# Patient Record
Sex: FEMALE | Race: BLACK OR AFRICAN AMERICAN | NOT HISPANIC OR LATINO | Employment: OTHER | URBAN - METROPOLITAN AREA
[De-identification: names, ages, dates, MRNs, and addresses within clinical notes are randomized per-mention and may not be internally consistent; named-entity substitution may affect disease eponyms.]

---
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
## 2019-01-05 ENCOUNTER — TRANSFERRED RECORDS (OUTPATIENT)
Dept: HEALTH INFORMATION MANAGEMENT | Facility: CLINIC | Age: 19
End: 2019-01-05

## 2019-01-06 ENCOUNTER — TRANSFERRED RECORDS (OUTPATIENT)
Dept: HEALTH INFORMATION MANAGEMENT | Facility: CLINIC | Age: 19
End: 2019-01-06

## 2019-01-07 ENCOUNTER — TRANSFERRED RECORDS (OUTPATIENT)
Dept: HEALTH INFORMATION MANAGEMENT | Facility: CLINIC | Age: 19
End: 2019-01-07

## 2019-01-08 ENCOUNTER — TRANSFERRED RECORDS (OUTPATIENT)
Dept: HEALTH INFORMATION MANAGEMENT | Facility: CLINIC | Age: 19
End: 2019-01-08

## 2019-01-09 ENCOUNTER — TRANSFERRED RECORDS (OUTPATIENT)
Dept: HEALTH INFORMATION MANAGEMENT | Facility: CLINIC | Age: 19
End: 2019-01-09

## 2019-01-18 ENCOUNTER — TRANSFERRED RECORDS (OUTPATIENT)
Dept: HEALTH INFORMATION MANAGEMENT | Facility: CLINIC | Age: 19
End: 2019-01-18

## 2019-01-19 ENCOUNTER — TRANSFERRED RECORDS (OUTPATIENT)
Dept: HEALTH INFORMATION MANAGEMENT | Facility: CLINIC | Age: 19
End: 2019-01-19

## 2019-01-24 ENCOUNTER — TRANSFERRED RECORDS (OUTPATIENT)
Dept: HEALTH INFORMATION MANAGEMENT | Facility: CLINIC | Age: 19
End: 2019-01-24

## 2019-01-31 ENCOUNTER — PRE VISIT (OUTPATIENT)
Dept: ONCOLOGY | Facility: CLINIC | Age: 19
End: 2019-01-31

## 2019-03-15 ENCOUNTER — TRANSFERRED RECORDS (OUTPATIENT)
Dept: HEALTH INFORMATION MANAGEMENT | Facility: CLINIC | Age: 19
End: 2019-03-15

## 2019-03-16 ENCOUNTER — TRANSFERRED RECORDS (OUTPATIENT)
Dept: HEALTH INFORMATION MANAGEMENT | Facility: CLINIC | Age: 19
End: 2019-03-16

## 2019-04-22 ENCOUNTER — TRANSFERRED RECORDS (OUTPATIENT)
Dept: HEALTH INFORMATION MANAGEMENT | Facility: CLINIC | Age: 19
End: 2019-04-22

## 2019-04-23 ENCOUNTER — TRANSFERRED RECORDS (OUTPATIENT)
Dept: HEALTH INFORMATION MANAGEMENT | Facility: CLINIC | Age: 19
End: 2019-04-23

## 2019-05-03 ENCOUNTER — TRANSFERRED RECORDS (OUTPATIENT)
Dept: HEALTH INFORMATION MANAGEMENT | Facility: CLINIC | Age: 19
End: 2019-05-03

## 2019-05-13 ENCOUNTER — TRANSFERRED RECORDS (OUTPATIENT)
Dept: HEALTH INFORMATION MANAGEMENT | Facility: CLINIC | Age: 19
End: 2019-05-13

## 2019-05-30 NOTE — TELEPHONE ENCOUNTER
RECORDS RECEIVED FROM: NEW right upper extremity weakness. Referred by Dr Bereket Collins at [facility]. Hx stroke as toddler.   DATE RECEIVED: Jun 6, 2019    NOTES STATUS DETAILS   OFFICE NOTE from referring provider Received Dr. Collins 5/13/19   OFFICE NOTE from other specialist N/A    DISCHARGE SUMMARY from hospital N/A    DISCHARGE REPORT from the ER N/A    OPERATIVE REPORT N/A    MEDICATION LIST Received    IMPLANT RECORD/STICKER N/A    LABS     CBC/DIFF N/A    CULTURES N/A    INJECTIONS DONE IN RADIOLOGY N/A    MRI N/A    CT SCAN N/A    XRAYS (IMAGES & REPORTS) N/A    TUMOR     PATHOLOGY  Slides & report N/A      05/30/19   12:37 PM  [facility]    05/31/19   9:35 AM  [facility] faxed back requiring nadeem. Faxed 2nd request with referral line in bigger font.

## 2019-06-05 NOTE — PROGRESS NOTES
[facility]  Orthopedics  Anai Franco MD  2019     Name: Jennifer Cervantes  MRN: [id number]  Age: 19 year old  : 2000  Referring provider: [facility] *     Chief Complaint: Consult (Surgery consult. Right upper extremity. Sickle cell. Patient stated that she had a stroke when she 1 years old and has had right sided weakness since then. Patient stated that [facility] is from [facility] and [facility] referred her. Patient stated that when she feel emotions her arm tenses up and is painful.  )    History of Present Illness:   Jennifer Cervantes is a 19 year old, female with a history of sickle cell and stroke when she was 1-2 years old who presents today for evaluation of right upper extremity weakness. She has had right upper extremity weakness since her stoke as an infant. She has feeling and motion in the arm, but reports her elbow is fixed at 90 degrees of flexion. When she gets startled her arm tenses up. Her wrist is fixed in roughly 90 degrees of flexion. She can slightly flex her digits but cannot extend her digits. She experiences weakness in the hand. She has been to OT and obtained a brace that provided no relief. Of note she is starting college at [employer] in the fall. She voices no further concerns at this time.     Review of Systems:   A 10-point review of systems was obtained and is negative except for as noted in the HPI.     Medications:     Current Outpatient Medications:      ALBUTEROL 108 (90 BASE) MCG/ACT inhaler, , Disp: , Rfl:      Budesonide-Formoterol Fumarate (SYMBICORT IN), Inhale  into the lungs., Disp: , Rfl:      EPINEPHrine (EPIPEN) 0.3 MG/0.3ML injection, , Disp: , Rfl:      GNP BISA-LAX 5 MG EC tablet, , Disp: , Rfl:      GNP SENNA-LAX 8.6 MG tablet, , Disp: , Rfl:      GNP STOOL SOFTENER 100 MG capsule, , Disp: , Rfl:      hydrOXYzine (ATARAX) 25 MG tablet, , Disp: , Rfl:      JADENU 360 MG tablet, , Disp: , Rfl:      levonorgestrel (LILETTA, 52 MG,) 19.5 MCG/DAY IUD, ,  "Disp: , Rfl:      lidocaine-prilocaine (EMLA) cream, , Disp: , Rfl:      LORazepam (ATIVAN) 1 MG tablet, , Disp: , Rfl:      oxyCODONE (ROXICODONE) 5 MG immediate release tablet, , Disp: , Rfl:      Pregabalin (LYRICA PO), Take by mouth 2 times daily, Disp: , Rfl:      Q- MG tablet, , Disp: , Rfl:      sertraline (ZOLOFT) 50 MG tablet, , Disp: , Rfl:      sodium phosphate (FLEET ENEMA) 7-19 GM/118ML rectal enema, , Disp: , Rfl:      traMADol (ULTRAM) 50 MG tablet, , Disp: , Rfl:      cyclobenzaprine (FLEXERIL) 5 MG tablet, , Disp: , Rfl:      DULERA 200-5 MCG/ACT oral inhaler, , Disp: , Rfl:      GNP ASPIRIN 325 MG EC tablet, , Disp: , Rfl:      hydroxyurea (HYDREA) 500 MG capsule CHEMO, , Disp: , Rfl:      ibuprofen (ADVIL,MOTRIN) 200 MG tablet, , Disp: , Rfl:      lidocaine (LIDODERM) 5 % patch, , Disp: , Rfl:      oxyCODONE (ROXICODONE) 10 MG immediate release tablet, , Disp: , Rfl:      SUMAtriptan (IMITREX) 100 MG tablet, , Disp: , Rfl:      UNABLE TO FIND, Hydreauxoria? - Sickle cell med, Disp: , Rfl:     Allergies:  Fish allergy  Seafood    Past Medical History:  No past medical history on file.    Past Surgical History:  No past surgical history on file.     Social History:  Patient is single. She denies tobacco use and alcohol use is not on file.     Family History:  No family history on file.    Physical Examination:  Height 1.626 m (5' 4\"), weight 68 kg (150 lb).  General: Healthy appearing female. Affect appropriate. Normal gait. Alert and oriented to surroundings.   Right Upper Extremity: elbow ROM full flexion and lacks terminal extension 90 degrees. 90 degrees of wrist flexion and maximal passive flexion is -45 degrees. Limited grasp. Right hand difficulty opening.     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    Plan:   Referred patient to [facility] to obtain a [facility] upper extremity assessment. She will follow-up with after this assessment to discuss further treatment options. All questions are answered in clinic.     Scribe Disclosure:  I, Jake Morelos, am serving as a scribe to document services personally performed by Anai Franco MD at this visit, based upon the provider's statements to me. All documentation has been reviewed by the aforementioned provider prior to being entered into the official medical record.     Anai Franco MD   Hand and Upper Extremity Specialist  [facility]    Answers for HPI/ROS submitted by the patient on 6/6/2019   General Symptoms: No  Skin Symptoms: No  HENT Symptoms: No  EYE SYMPTOMS: No  HEART SYMPTOMS: No  LUNG SYMPTOMS: Yes  INTESTINAL SYMPTOMS: No  URINARY SYMPTOMS: No  GYNECOLOGIC SYMPTOMS: No  BREAST SYMPTOMS: No  SKELETAL SYMPTOMS: No  BLOOD SYMPTOMS: No  NERVOUS SYSTEM SYMPTOMS: No  MENTAL HEALTH SYMPTOMS: Yes  Cough: Yes  Sputum or phlegm: No  Coughing up blood: No  Difficulty breating or shortness of breath: Yes  Snoring: Yes  Wheezing: No  Difficulty breathing on exertion: No  Nighttime Cough: No  Difficulty breathing when lying flat: Yes  Nervous or Anxious: Yes  Depression: Yes  Trouble sleeping: Yes  Trouble thinking or concentrating: No  Mood changes: Yes  Panic attacks: Yes

## 2019-06-06 ENCOUNTER — OFFICE VISIT (OUTPATIENT)
Dept: ORTHOPEDICS | Facility: CLINIC | Age: 19
End: 2019-06-06
Payer: COMMERCIAL

## 2019-06-06 ENCOUNTER — PRE VISIT (OUTPATIENT)
Dept: ORTHOPEDICS | Facility: CLINIC | Age: 19
End: 2019-06-06

## 2019-06-06 VITALS — HEIGHT: 64 IN | WEIGHT: 150 LBS | BODY MASS INDEX: 25.61 KG/M2

## 2019-06-06 DIAGNOSIS — M21.949: Primary | ICD-10-CM

## 2019-06-06 RX ORDER — HYDROXYUREA 500 MG/1
CAPSULE ORAL
COMMUNITY
End: 2019-09-25

## 2019-06-06 ASSESSMENT — ENCOUNTER SYMPTOMS
SNORES LOUDLY: 1
COUGH: 1
HEMOPTYSIS: 0
DYSPNEA ON EXERTION: 0
NERVOUS/ANXIOUS: 1
COUGH DISTURBING SLEEP: 0
POSTURAL DYSPNEA: 1
DECREASED CONCENTRATION: 0
INSOMNIA: 1
SHORTNESS OF BREATH: 1
WHEEZING: 0
SPUTUM PRODUCTION: 0
DEPRESSION: 1
PANIC: 1

## 2019-06-06 ASSESSMENT — MIFFLIN-ST. JEOR: SCORE: 1435.4

## 2019-06-06 NOTE — NURSING NOTE
"Reason For Visit:   Chief Complaint   Patient presents with     Consult     Surgery consult. Right upper extremity. Sickle cell. Patient stated that she had a stroke when she 1 years old and has had right sided weakness since then. Patient stated that [facility] is from [facility] and [facility] referred her. Patient stated that when she feel emotions her arm tenses up and is painful.         Primary MD: Jeimy Decker  Ref. MD: Dr. Bereket Collins, Hemotologist.       Age: 19 year old    ?  No      Ht 1.626 m (5' 4\")   Wt 68 kg (150 lb)   BMI 25.75 kg/m        Pain Assessment  Patient Currently in Pain: Denies               QuickDASH Assessment  No flowsheet data found.       Current Outpatient Medications   Medication Sig Dispense Refill     ALBUTEROL 108 (90 BASE) MCG/ACT inhaler        Budesonide-Formoterol Fumarate (SYMBICORT IN) Inhale  into the lungs.       EPINEPHrine (EPIPEN) 0.3 MG/0.3ML injection        GNP BISA-LAX 5 MG EC tablet        GNP SENNA-LAX 8.6 MG tablet        GNP STOOL SOFTENER 100 MG capsule        hydrOXYzine (ATARAX) 25 MG tablet        JADENU 360 MG tablet        levonorgestrel (LILETTA, 52 MG,) 19.5 MCG/DAY IUD        lidocaine-prilocaine (EMLA) cream        LORazepam (ATIVAN) 1 MG tablet        oxyCODONE (ROXICODONE) 5 MG immediate release tablet        Pregabalin (LYRICA PO) Take by mouth 2 times daily       Q- MG tablet        sertraline (ZOLOFT) 50 MG tablet        sodium phosphate (FLEET ENEMA) 7-19 GM/118ML rectal enema        traMADol (ULTRAM) 50 MG tablet        cyclobenzaprine (FLEXERIL) 5 MG tablet        DULERA 200-5 MCG/ACT oral inhaler        GNP ASPIRIN 325 MG EC tablet        hydroxyurea (HYDREA) 500 MG capsule CHEMO        ibuprofen (ADVIL,MOTRIN) 200 MG tablet        lidocaine (LIDODERM) 5 % patch        oxyCODONE (ROXICODONE) 10 MG immediate release tablet        SUMAtriptan (IMITREX) 100 MG tablet        UNABLE TO FIND Hydreauxoria? - Sickle cell med   "       Allergies   Allergen Reactions     Fish-Derived Products Hives     Seafood Hives     Diagnostic X-Ray Materials      PN: sickle cell     Fish Allergy      Gadolinium Other (See Comments)     Tongue swelling       Tiffanie Woodall LPN

## 2019-06-06 NOTE — LETTER
2019       RE: Jennifer Cervantes  [address]  [place] [facility] [address]     Dear Colleague,    Thank you for referring your patient, Jennifer Cervantes, to the [facility]. Please see a copy of my visit note below.    [facility]  Orthopedics  Anai Franco MD  2019     Name: Jennifer Cervantes  MRN: [id number]  Age: 19 year old  : 2000  Referring provider: [facility] *     Chief Complaint: Consult (Surgery consult. Right upper extremity. Sickle cell. Patient stated that she had a stroke when she 1 years old and has had right sided weakness since then. [facility] and [facility]. Patient stated that when she feel emotions her arm tenses up and is painful.  )    History of Present Illness:   Jennifer Cervantes is a 19 year old, female with a history of sickle cell and stroke when she was 1-2 years old who presents today for evaluation of right upper extremity weakness. She has had right upper extremity weakness since her stoke as an infant. She has feeling and motion in the arm, but reports her elbow is fixed at 90 degrees of flexion. When she gets startled her arm tenses up. Her wrist is fixed in roughly 90 degrees of flexion. She can slightly flex her digits but cannot extend her digits. She experiences weakness in the hand. She has been to OT and obtained a brace that provided no relief. Of note she is starting college at [employer] in the fall. She voices no further concerns at this time.     Review of Systems:   A 10-point review of systems was obtained and is negative except for as noted in the HPI.     Medications:     Current Outpatient Medications:      ALBUTEROL 108 (90 BASE) MCG/ACT inhaler, , Disp: , Rfl:      Budesonide-Formoterol Fumarate (SYMBICORT IN), Inhale  into the lungs., Disp: , Rfl:      EPINEPHrine (EPIPEN) 0.3 MG/0.3ML injection, , Disp: , Rfl:      GNP BISA-LAX 5 MG EC  "tablet, , Disp: , Rfl:      GNP SENNA-LAX 8.6 MG tablet, , Disp: , Rfl:      GNP STOOL SOFTENER 100 MG capsule, , Disp: , Rfl:      hydrOXYzine (ATARAX) 25 MG tablet, , Disp: , Rfl:      JADENU 360 MG tablet, , Disp: , Rfl:      levonorgestrel (LILETTA, 52 MG,) 19.5 MCG/DAY IUD, , Disp: , Rfl:      lidocaine-prilocaine (EMLA) cream, , Disp: , Rfl:      LORazepam (ATIVAN) 1 MG tablet, , Disp: , Rfl:      oxyCODONE (ROXICODONE) 5 MG immediate release tablet, , Disp: , Rfl:      Pregabalin (LYRICA PO), Take by mouth 2 times daily, Disp: , Rfl:      Q- MG tablet, , Disp: , Rfl:      sertraline (ZOLOFT) 50 MG tablet, , Disp: , Rfl:      sodium phosphate (FLEET ENEMA) 7-19 GM/118ML rectal enema, , Disp: , Rfl:      traMADol (ULTRAM) 50 MG tablet, , Disp: , Rfl:      cyclobenzaprine (FLEXERIL) 5 MG tablet, , Disp: , Rfl:      DULERA 200-5 MCG/ACT oral inhaler, , Disp: , Rfl:      GNP ASPIRIN 325 MG EC tablet, , Disp: , Rfl:      hydroxyurea (HYDREA) 500 MG capsule CHEMO, , Disp: , Rfl:      ibuprofen (ADVIL,MOTRIN) 200 MG tablet, , Disp: , Rfl:      lidocaine (LIDODERM) 5 % patch, , Disp: , Rfl:      oxyCODONE (ROXICODONE) 10 MG immediate release tablet, , Disp: , Rfl:      SUMAtriptan (IMITREX) 100 MG tablet, , Disp: , Rfl:      UNABLE TO FIND, Hydreauxoria? - Sickle cell med, Disp: , Rfl:     Allergies:  Fish allergy  Seafood    Past Medical History:  No past medical history on file.    Past Surgical History:  No past surgical history on file.     Social History:  Patient is single. She denies tobacco use and alcohol use is not on file.     Family History:  No family history on file.    Physical Examination:  Height 1.626 m (5' 4\"), weight 68 kg (150 lb).  General: Healthy appearing female. Affect appropriate. Normal gait. Alert and oriented to surroundings.   Right Upper Extremity: elbow ROM full flexion and lacks terminal extension 90 degrees. 90 degrees of wrist flexion and maximal passive flexion is -45 " degrees. Limited grasp. Right hand difficulty opening.     Assessment:   19 year old, female with right spastic hemiplegia secondary to stroke with sickle cell disease, elbow flexion contracture to 90 degrees, right wrist flexion contracture to 90 degrees and limited movement of right fingers and thumb.    Plan:   Referred patient to [facility] to obtain a [facility] upper extremity assessment. She will follow-up with after this assessment to discuss further treatment options. All questions are answered in clinic.     Scribe Disclosure:  I, Jake oMrelos, am serving as a scribe to document services personally performed by Anai Franco MD at this visit, based upon the provider's statements to me. All documentation has been reviewed by the aforementioned provider prior to being entered into the official medical record.     Anai Franco MD   Hand and Upper Extremity Specialist  [facility]

## 2019-07-05 ENCOUNTER — TELEPHONE (OUTPATIENT)
Dept: ORTHOPEDICS | Facility: CLINIC | Age: 19
End: 2019-07-05

## 2019-07-11 NOTE — PROGRESS NOTES
[facility]  Orthopedics  Anai Franco MD  2019     Name: Jennifer Cervantes  MRN: [id number]  Age: 19 year old  : 2000  Referring provider: Referred Self     Chief Complaint: RECHECK (Right upper extremity follow up after upper extremtiy evaluation at [place] 19)    History of Present Illness:   Jennifer Cervantes is a 19 year old female with a history of sickle cell and stroke when she was 1-2 years old who presents today for follow-up of right upper extremity weakness. I last evaluated her on 19 at which time she reported right hand weakness. I referred patient to [facility] to obtain a [facility] upper extremity assessment.      Today, she notes that she has been taking her medications for Sickle cell. She continues to endorse stiffness in her right hand. She is unable to straighten her wrist or her fingers. She has an appointment schedule tomorrow with Dr. Robinson Collins. She also notes having enlarged tonsils.     Review of Systems:   A 10-point review of systems was obtained and is negative except for as noted in the HPI.     Physical Examination:  There were no vitals taken for this visit.   force  R hand pincher force: 1.814 kg (4 lb)  R hand  level 2 force: 2.268 kg (5 lb)  L hand pincher force: 8.618 kg (19 lb)  L hand  level  2 force: 36.3     (80 lb)    General: Healthy appearing female. Affect appropriate. Normal gait. Alert and oriented to surroundings.   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     Plan:   At this juncture, I recommended a surgical intervention. Surgical intervention will include: elbow flexor lengthening, flexor pronator slide of wrist and finger slide, and thumb adductor lengthening. I discussed the risks, benefits, alternatives regarding the proposed surgery with the patient who both demonstrated understanding and indicated a desire to proceed with the surgery as planned. She will be placed in a cast for one month after the surgery, a brace for one month, and therapy for two months.     Scribe Disclosure:  I, Abdullahi Hadley, am serving as a scribe to document services personally performed by Anai Franco MD at this visit, based upon the provider's statements to me. All documentation has been reviewed by the aforementioned provider prior to being entered into the official medical record.     Anai Franco MD   Hand and Upper Extremity Specialist  [facility]        Answers for HPI/ROS submitted by the patient on 8/29/2019   PHQ9 TOTAL SCORE: 6  General Symptoms: Yes  Skin Symptoms: No  HENT Symptoms: No  EYE SYMPTOMS: Yes  HEART SYMPTOMS: No  LUNG SYMPTOMS: No  INTESTINAL SYMPTOMS: No  URINARY SYMPTOMS: No  GYNECOLOGIC SYMPTOMS: No  BREAST SYMPTOMS: No  SKELETAL SYMPTOMS: No  BLOOD SYMPTOMS: No  NERVOUS SYSTEM SYMPTOMS: No  MENTAL HEALTH SYMPTOMS: No  Fever: No  Loss of appetite: Yes  Weight loss: Yes  Weight gain: No  Fatigue: No  Night sweats: No  Chills: No  Increased stress: No  Excessive hunger: No  Excessive thirst: No  Feeling hot or cold when others believe the temperature is normal: Yes  Loss of height: No  Post-operative complications: No  Surgical site pain: No  Hallucinations: No  Change in or Loss of Energy: No  Hyperactivity: No  Eye pain: No  Vision loss: No  Dry eyes: No  Watery eyes: No  Eye bulging: No  Double vision: No  Flashing of lights: No  Spots: No

## 2019-07-12 ENCOUNTER — TRANSFERRED RECORDS (OUTPATIENT)
Dept: HEALTH INFORMATION MANAGEMENT | Facility: CLINIC | Age: 19
End: 2019-07-12

## 2019-07-13 ENCOUNTER — TRANSFERRED RECORDS (OUTPATIENT)
Dept: HEALTH INFORMATION MANAGEMENT | Facility: CLINIC | Age: 19
End: 2019-07-13

## 2019-07-18 ENCOUNTER — TRANSFERRED RECORDS (OUTPATIENT)
Dept: HEALTH INFORMATION MANAGEMENT | Facility: CLINIC | Age: 19
End: 2019-07-18

## 2019-07-19 ENCOUNTER — TRANSFERRED RECORDS (OUTPATIENT)
Dept: HEALTH INFORMATION MANAGEMENT | Facility: CLINIC | Age: 19
End: 2019-07-19

## 2019-07-29 ENCOUNTER — TRANSFERRED RECORDS (OUTPATIENT)
Dept: HEALTH INFORMATION MANAGEMENT | Facility: CLINIC | Age: 19
End: 2019-07-29

## 2019-07-30 ENCOUNTER — TRANSFERRED RECORDS (OUTPATIENT)
Dept: HEALTH INFORMATION MANAGEMENT | Facility: CLINIC | Age: 19
End: 2019-07-30

## 2019-07-31 ENCOUNTER — TRANSFERRED RECORDS (OUTPATIENT)
Dept: HEALTH INFORMATION MANAGEMENT | Facility: CLINIC | Age: 19
End: 2019-07-31

## 2019-08-02 ENCOUNTER — TRANSFERRED RECORDS (OUTPATIENT)
Dept: HEALTH INFORMATION MANAGEMENT | Facility: CLINIC | Age: 19
End: 2019-08-02

## 2019-08-05 ENCOUNTER — TRANSFERRED RECORDS (OUTPATIENT)
Dept: HEALTH INFORMATION MANAGEMENT | Facility: CLINIC | Age: 19
End: 2019-08-05

## 2019-08-14 NOTE — TELEPHONE ENCOUNTER
FUTURE VISIT INFORMATION      FUTURE VISIT INFORMATION:    Date: 8.29.19    Time: 9:00 AM    Location: [facility]  REFERRAL INFORMATION:    Referring provider:  Self-referred    Referring providers clinic:  N/A    Reason for visit/diagnosis  Tonsilitits    RECORDS REQUESTED FROM:       Clinic name Comments Records Status Imaging Status   [facility] Requested all notes related to diagnoses and imaging. In process                                      Action 8/23/2019 10:58am  PP   Action Taken [facility] and they did not receive request for recs.  They also need a release from pt. Called pt and LVM about this and left call back number.      Action 8/26/2019 8:05am  PP   Action Taken Called pt and LVM about release needed for [facility] records. Emailed release to pt.      Action 8/28/2019 8:36am  PP   Action Taken Called pt and informed her that we need release filled out to obtain [facility] records.  Pt may be willing to return release to us today, but it is short notice for her.     8/28/19 12:31PM If release is not here today, we will have patient sign one in clinic. A message has been sent to provider to notify if these recs are needed to have patient sign JULIANNE in clinic. - Amay

## 2019-08-21 ENCOUNTER — TRANSFERRED RECORDS (OUTPATIENT)
Dept: HEALTH INFORMATION MANAGEMENT | Facility: CLINIC | Age: 19
End: 2019-08-21

## 2019-08-23 ENCOUNTER — TRANSFERRED RECORDS (OUTPATIENT)
Dept: HEALTH INFORMATION MANAGEMENT | Facility: CLINIC | Age: 19
End: 2019-08-23

## 2019-08-29 ENCOUNTER — OFFICE VISIT (OUTPATIENT)
Dept: ORTHOPEDICS | Facility: CLINIC | Age: 19
End: 2019-08-29
Payer: COMMERCIAL

## 2019-08-29 ENCOUNTER — OFFICE VISIT (OUTPATIENT)
Dept: OTOLARYNGOLOGY | Facility: CLINIC | Age: 19
End: 2019-08-29
Payer: COMMERCIAL

## 2019-08-29 ENCOUNTER — PRE VISIT (OUTPATIENT)
Dept: OTOLARYNGOLOGY | Facility: CLINIC | Age: 19
End: 2019-08-29

## 2019-08-29 VITALS
RESPIRATION RATE: 17 BRPM | WEIGHT: 144 LBS | DIASTOLIC BLOOD PRESSURE: 63 MMHG | BODY MASS INDEX: 23.99 KG/M2 | TEMPERATURE: 98.3 F | SYSTOLIC BLOOD PRESSURE: 126 MMHG | OXYGEN SATURATION: 98 % | HEART RATE: 95 BPM | HEIGHT: 65 IN

## 2019-08-29 DIAGNOSIS — J35.1 ENLARGED TONSILS: Primary | ICD-10-CM

## 2019-08-29 DIAGNOSIS — I69.852: Primary | ICD-10-CM

## 2019-08-29 DIAGNOSIS — R06.83 SNORING: ICD-10-CM

## 2019-08-29 ASSESSMENT — ENCOUNTER SYMPTOMS
DECREASED APPETITE: 1
WEIGHT LOSS: 1
HALLUCINATIONS: 0
ALTERED TEMPERATURE REGULATION: 1
NIGHT SWEATS: 0
WEIGHT GAIN: 0
POLYPHAGIA: 0
POLYDIPSIA: 0
FATIGUE: 0
DOUBLE VISION: 0
CHILLS: 0
EYE PAIN: 0
EYE WATERING: 0
INCREASED ENERGY: 0
FEVER: 0

## 2019-08-29 ASSESSMENT — PAIN SCALES - GENERAL: PAINLEVEL: NO PAIN (0)

## 2019-08-29 ASSESSMENT — MIFFLIN-ST. JEOR: SCORE: 1429.67

## 2019-08-29 ASSESSMENT — PATIENT HEALTH QUESTIONNAIRE - PHQ9
SUM OF ALL RESPONSES TO PHQ QUESTIONS 1-9: 6
SUM OF ALL RESPONSES TO PHQ QUESTIONS 1-9: 6

## 2019-08-29 NOTE — PROGRESS NOTES
"[facility] New Patient Visit Note        Otolaryngology        August 29, 2019    Referring Provider:  Self         HPI:  Jennifer Cervantes is a 19 year old female who presents for evaluation of enlarged tonsils and for the possibility of needing to have her tonsils removed.    Tomorrow reports that she has had issues with her tonsils since she was a child.  They have always been \"too big\".  He states when she was younger, they talked about removing her tonsils, however, because of her sickle cell disease and the fact that she had a stroke at age 1, they did not want to take her off of her aspirin.    As a child, and even into her most recent years, she will get flareups of sore throat and lymph nodes at least once a month.  Sometimes she gets infection so bad that she has to be put on antibiotics.  This happens 2-3 times a year.  It usually with those episodes that she has a fever associated with the sore throat and lymphadenopathy.    Sometimes, when the sore throat is quite severe, she has ear pain in both ears but it is always worse in the right than the left.  That goes away once her symptoms calm down.  She is never been told she has had exudates on her tonsils or stones.  When her symptoms flare, she has pain with eating and drinking, otherwise it does not seem to be an issue.    She does report that sometimes when she is resting comfortably during the day, sitting in a chair, etc., she will sometimes breathe really heavy.  She is asked both her provider who cares for her sickle cell and her asthma provider about this and they tell her it is just her asthma.  She was wondering if it could be related to her tonsils but they did not think so.  She does do some snoring at night.  She reports that her mother has told her that she sometimes quits breathing at night.  She is never been tested for sleep apnea.  She does have excessive sleepiness throughout the day most days.    Her past medical " history is quite complicated in the fact that she has sickle cell disease and had a stroke at age 1.  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  With physical therapy, she has been able to get the strength and movement back in her right leg.  She still has paralysis of her right upper extremity.  She is working with 1 of her orthopedic physicians, Dr. Isbell, about the possibility of doing some surgery to help her get range of motion and use of the right upper extremity back.  Her sickle cell disease as managed by a provider at [facility] at this time.  It would be managed in there until she is 20.  At the age of 20, she has to switch to the [facility].    She recently had a bilateral breast reduction in April 2019.  At that point, she was taken off the aspirin for a total of 2 weeks and had no concerning incidents.    Patient is a non-smoker and does not use E cigarettes or other smokeless tobacco.  She is not an alcohol drinker.      ROS:  10 point review of systems completed and is negative except for those listed above    PMH:   Past Medical History:   Diagnosis Date     Anemia      Anxiety      Bleeding disorder (H)      Blood clotting disorder (H)      Cerebral infarction (H)      Depressive disorder      Migraines      Uncomplicated asthma        PSH: Bilateral breast reduction in April 2019    FamH: No family history on file.    SocH:   Social History     Tobacco Use     Smoking status: Never Smoker     Smokeless tobacco: Never Used   Substance Use Topics     Alcohol use: Never     Alcohol/week: 0.0 oz     Drug use: Never     Patient currently unemployed      Medications:   Current Outpatient Medications   Medication     ALBUTEROL 108 (90 BASE) MCG/ACT inhaler     Budesonide-Formoterol Fumarate (SYMBICORT IN)     cyclobenzaprine (FLEXERIL) 5 MG tablet     DULERA 200-5 MCG/ACT oral inhaler     EPINEPHrine (EPIPEN) 0.3 MG/0.3ML injection     GNP ASPIRIN 325 MG EC tablet     GNP BISA-LAX  "5 MG EC tablet     GNP SENNA-LAX 8.6 MG tablet     GNP STOOL SOFTENER 100 MG capsule     hydroxyurea (HYDREA) 500 MG capsule CHEMO     hydrOXYzine (ATARAX) 25 MG tablet     ibuprofen (ADVIL,MOTRIN) 200 MG tablet     JADENU 360 MG tablet     levonorgestrel (LILETTA, 52 MG,) 19.5 MCG/DAY IUD     lidocaine (LIDODERM) 5 % patch     lidocaine-prilocaine (EMLA) cream     LORazepam (ATIVAN) 1 MG tablet     oxyCODONE (ROXICODONE) 10 MG immediate release tablet     oxyCODONE (ROXICODONE) 5 MG immediate release tablet     Pregabalin (LYRICA PO)     Q- MG tablet     sertraline (ZOLOFT) 50 MG tablet     sodium phosphate (FLEET ENEMA) 7-19 GM/118ML rectal enema     SUMAtriptan (IMITREX) 100 MG tablet     traMADol (ULTRAM) 50 MG tablet     UNABLE TO FIND     No current facility-administered medications for this visit.        Allergies:     Allergies   Allergen Reactions     Fish-Derived Products Hives     Seafood Hives     Diagnostic X-Ray Materials      PN: sickle cell     Fish Allergy      Gadolinium Other (See Comments)     Tongue swelling         Physical Exam:   /63   Pulse 95   Temp 98.3  F (36.8  C)   Resp 17   Ht 1.66 m (5' 5.35\")   Wt 65.3 kg (144 lb)   SpO2 98%   BMI 23.70 kg/m      Constitutional: The patient was unaccompanied, well-groomed, and in no acute distress.    Head: Normocephalic and atraumatic.   Eyes: Pupils were equal and reactive.  Extraocular movement intact.    Ears: Pinnae and tragus non-tender.  EACs and TMs were clear under microscopic exam.   Nose: Sinuses were non-tender.  Anterior rhinoscopy revealed midline septum and absence of purulence or polyps.    Mouth: Mucosa pink and moist, tonsils non-erythematous, no exudates, right tonsil 3+, left tonsil 3+ (see image below), uvula midline  Neck: No lymphadenopathy in the neck.  No palpable thyroid.  Normal range of motion  Respiratory: Breathing comfortably without stridor or exertion of accessory muscles.   Skin: Normal:  warm and " pink without rash  Neurologic: Alert and oriented x 3.  CN's III-XII within normal limits.  Voice normal.   Psychiatric: The patient's affect was calm, cooperative, and appropriate.    Communication: Normal; communicates verbally, normal voice quality.          Fiberoptic Endoscopy:  Consent for fiberoptic laryngoscopy was obtained, and we confirmed correctness of procedure and identity of patient.  Fiberoptic laryngoscopy was indicated due to history of snoring, enlarged tonsils and request for tonsillectomy.  The nose was topically decongested and anesthetized.  The fiberoptic laryngoscope was passed under endoscopic vision.  The turbinates were normal.  The inferior and middle meati were clear bilaterally without purulence, masses, or polyps.  Enlargement of adenoids noted.  The Eustachian tubes were clear.  The soft palate appeared normal with good mobility.  The epiglottis was sharp and the visualized portion of the vallecula was clear, but there was lymphoid tissue present in the left pyriform recess.  The larynx was clear with mobile cords.  The arytenoids were clear and there was no pooling in the hypopharynx.   Left lingula tonsil enlarged, with excess lymphoid issues inferior to this along the left pharyngeal wall.      Adenoids        Picture of left lingular tonsil and excess lymphoid tissue          Video of endoscopy (please click on image above to watch)        Assessment/Plan:   1. Bilaterally tonsillar enlargement, enlarged adenoids.  Patient with significantly enlarged tonsils and adenoids.  Likely suffering from sleep apnea as part of this enlargement.  Endoscopy done due to the history of the ear pain with symptoms of tonsillitis.  She does have enlarged adenoid tissue and other lymphoid tissue as well.    The patient does meet criteria for tonsillectomy.  We did discuss the risks of tonsillectomy and an adult, which include increased pain and increased bleeding risk.  Given her history of sickle  cell, she is also at increased risk of potential stroke because she would need to be off her aspirin for at least 1 week prior to his surgery.  Patient verbalized her understanding of this.  She knows she will have to discuss this more with the surgeon as well as her doctor who manages her sickle cell disease.    At this point, I think is appropriate to refer her to 1 of our surgeons to discuss a tonsillectomy.  I also will refer her for a sleep study as I think she likely has some underlying sleep apnea.  She will return to see me only on as as needed basis.      2.  Snoring.  See #1 above.        Dee Dee Sevilla PA-C  Otolaryngology  Head & Neck Surgery  [phone]      CC:  Farhana Guy MD  Otolaryngology & Sleep Surgery  [place] [address]

## 2019-08-29 NOTE — PATIENT INSTRUCTIONS
Jennifer Cervantes,    It was a pleasure to see you today.    1. You were seen in the ENT Clinic today by Dee Dee Sevilla PA-C    If you have any questions or concerns after your appointment, please call   - Option 1: ENT Clinic: [phone]    2. Please arrange a sleep study for the patient.    3. Please set up an appointment with Dr. Guy to discuss tonsillectomy.      Thank you,  Dee Dee Sevilla PA-C  Otolaryngology  Head & Neck Surgery  [phone]

## 2019-08-29 NOTE — LETTER
2019       RE: Jennifer Cervantes  [address]  [place] [facility] [address]     Dear Colleague,    Thank you for referring your patient, Jennifer Cervantes, to the [facility]. Please see a copy of my visit note below.    [facility]  Orthopedics  Anai Franco MD  2019     Name: Jennifer Cervantes  MRN: [id number]  Age: 19 year old  : 2000  Referring provider: Referred Self     Chief Complaint: RECHECK (Right upper extremity follow up after upper extremtiy evaluation at [place] 19)    History of Present Illness:   Jennifer Cervantes is a 19 year old female with a history of sickle cell and stroke when she was 1-2 years old who presents today for follow-up of right upper extremity weakness.  I last evaluated her on 19 at which time she reported right hand weakness. I referred patient to [facility] to obtain a [facility] upper extremity assessment.      Today, she notes that she has been taking her medications for Sickle cell. She continues to endorse stiffness in her right hand. She is unable to straighten her wrist or her fingers. She has an appointment schedule tomorrow with Dr. Robinson Collins. She also notes having enlarged tonsils.     Review of Systems:   A 10-point review of systems was obtained and is negative except for as noted in the HPI.     Physical Examination:  There were no vitals taken for this visit.   force  R hand pincher force: 1.814 kg (4 lb)  R hand  level 2 force: 2.268 kg (5 lb)  L hand pincher force: 8.618 kg (19 lb)  L hand  level  2 force: 36.3     (80 lb)    General: Healthy appearing female. Affect appropriate. Normal gait. Alert and oriented to surroundings.   Right Upper Extremity:    SFA percentage: 22%  DPA percentage: 39%    Right passive range of motion:  Shoulder flexion: 120 degrees   Shoulder abduction: 120 degrees   Shoulder external rotation: 90 degrees    Shoulder internal rotation: 60 degrees   Elbow Flexion: 140 degrees   Elbow Extension: 65 degrees   Forearm pronation: 90 degrees   Supination: 45 degrees   Wrist extension: 40 degrees     Stereognosis:   Left: 12  Right: 11    Assessment:   19 year old, female with right spastic hemiplegia secondary to stroke with sickle cell disease.     Plan:   At this juncture, I recommended a surgical intervention. Surgical intervention will include: elbow flexor lengthening, flexor pronator slide of wrist and finger slide, and thumb adductor lengthening. I discussed the risks, benefits, alternatives regarding the proposed surgery with the patient who both demonstrated understanding and indicated a desire to proceed with the surgery as planned. She will be placed in a cast for one month after the surgery, a brace for one month, and therapy for two months.     Scribe Disclosure:  I, Abdullahi Hadley, am serving as a scribe to document services personally performed by Anai Franco MD at this visit, based upon the provider's statements to me. All documentation has been reviewed by the aforementioned provider prior to being entered into the official medical record.     Anai Franco MD   Hand and Upper Extremity Specialist  [facility]

## 2019-08-29 NOTE — NURSING NOTE
Reason For Visit:   Chief Complaint   Patient presents with     RECHECK     Right upper extremity follow up after upper extremtiy evaluation at [place] 8/22/19       Primary MD: Jeimy Decker  Age: 19 year old    ?  No      There were no vitals taken for this visit.      Pain Assessment  Patient Currently in Pain: Denies    Hand Dominance Evaluation  Hand Dominance: Left(originally right hand prior to stroke)      force  R hand pincher force: 1.814 kg (4 lb)  R hand  level 2 force: 2.268 kg (5 lb)  L hand pincher force: 8.618 kg (19 lb)  L hand  level  2 force: 36.3 kg (80 lb)    QuickDASH Assessment  No flowsheet data found.       Current Outpatient Medications   Medication Sig Dispense Refill     ALBUTEROL 108 (90 BASE) MCG/ACT inhaler        Budesonide-Formoterol Fumarate (SYMBICORT IN) Inhale  into the lungs.       cyclobenzaprine (FLEXERIL) 5 MG tablet        DULERA 200-5 MCG/ACT oral inhaler        EPINEPHrine (EPIPEN) 0.3 MG/0.3ML injection        GNP ASPIRIN 325 MG EC tablet        GNP BISA-LAX 5 MG EC tablet        GNP SENNA-LAX 8.6 MG tablet        GNP STOOL SOFTENER 100 MG capsule        hydroxyurea (HYDREA) 500 MG capsule CHEMO        hydrOXYzine (ATARAX) 25 MG tablet        ibuprofen (ADVIL,MOTRIN) 200 MG tablet        JADENU 360 MG tablet        levonorgestrel (LILETTA, 52 MG,) 19.5 MCG/DAY IUD        lidocaine (LIDODERM) 5 % patch        lidocaine-prilocaine (EMLA) cream        LORazepam (ATIVAN) 1 MG tablet        oxyCODONE (ROXICODONE) 10 MG immediate release tablet        oxyCODONE (ROXICODONE) 5 MG immediate release tablet        Pregabalin (LYRICA PO) Take by mouth 2 times daily       Q- MG tablet        sertraline (ZOLOFT) 50 MG tablet        sodium phosphate (FLEET ENEMA) 7-19 GM/118ML rectal enema        SUMAtriptan (IMITREX) 100 MG tablet        traMADol (ULTRAM) 50 MG tablet        UNABLE TO FIND Hydreauxoria? - Sickle cell med         Allergies   Allergen  Reactions     Fish-Derived Products Hives     Seafood Hives     Diagnostic X-Ray Materials      PN: sickle cell     Fish Allergy      Gadolinium Other (See Comments)     Tongue swelling       Sisi Bernal, ATC

## 2019-08-29 NOTE — TELEPHONE ENCOUNTER
FUTURE VISIT INFORMATION      FUTURE VISIT INFORMATION:    Date: 9/5/19    Time: 9:30 am    Location: [facility]  REFERRAL INFORMATION:    Referring provider:  RONALDO Pearl    Referring providers clinic:  [facility]    Reason for visit/diagnosis  Discuss Tonsillectomy    RECORDS REQUESTED FROM:       Clinic name Comments Records Status Imaging Status   [facility] Office Visit-8/29/19-RONALDO Wall Epic

## 2019-08-29 NOTE — NURSING NOTE
"Chief Complaint   Patient presents with     Consult     tonsilitis      Blood pressure 126/63, pulse 95, temperature 98.3  F (36.8  C), resp. rate 17, height 1.66 m (5' 5.35\"), weight 65.3 kg (144 lb), SpO2 98 %.    Marlon Arizmendi LPN    "

## 2019-08-29 NOTE — LETTER
"8/29/2019       RE: Jennifer Cervantes  [address]  [place] [address]     Dear Colleague,    Thank you for referring your patient, Jennifer Cervantes, to the [facility] EAR NOSE AND THROAT at [facility]. Please see a copy of my visit note below.    [facility] Ear, Nose [facility] Th[facility]at [facility] New Patient Visit Note        Otolaryngology        August 29, 2019    Referring Provider:  Self         HPI:  Jennifer Cervantes is a 19 year old female who presents for evaluation of enlarged tonsils and for the possibility of needing to have her tonsils removed.    Tomorrow reports that she has had issues with her tonsils since she was a child.  They have always been \"too big\".  He states when she was younger, they talked about removing her tonsils, however, because of her sickle cell disease and the fact that she had a stroke at age 1, they did not want to take her off of her aspirin.    As a child, and even into her most recent years, she will get flareups of sore throat and lymph nodes at least once a month.  Sometimes she gets infection so bad that she has to be put on antibiotics.  This happens 2-3 times a year.  It usually with those episodes that she has a fever associated with the sore throat and lymphadenopathy.    Sometimes, when the sore throat is quite severe, she has ear pain in both ears but it is always worse in the right than the left.  That goes away once her symptoms calm down.  She is never been told she has had exudates on her tonsils or stones.  When her symptoms flare, she has pain with eating and drinking, otherwise it does not seem to be an issue.    She does report that sometimes when she is resting comfortably during the day, sitting in a chair, etc., she will sometimes breathe really heavy.  She is asked both her provider who cares for her sickle cell and her asthma provider about this and they tell her it is just her asthma.  She was wondering if it could be related to her " tonsils but they did not think so.  She does do some snoring at night.  She reports that her mother has told her that she sometimes quits breathing at night.  She is never been tested for sleep apnea.  She does have excessive sleepiness throughout the day most days.    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  With physical therapy, she has been able to get the strength and movement back in her right leg.  She still has paralysis of her right upper extremity.  She is working with 1 of her orthopedic physicians, Dr. Isbell, about the possibility of doing some surgery to help her get range of motion and use of the right upper extremity back.  Her sickle cell disease as managed by a provider at [facility] at this time.  It would be managed in there until she is 20.  At the age of 20, she has to switch to the [facility].    She recently had a bilateral breast reduction in April 2019.  At that point, she was taken off the aspirin for a total of 2 weeks and had no concerning incidents.    Patient is a non-smoker and does not use E cigarettes or other smokeless tobacco.  She is not an alcohol drinker.      ROS:  10 point review of systems completed and is negative except for those listed above    PMH:   Past Medical History:   Diagnosis Date     Anemia      Anxiety      Bleeding disorder (H)      Blood clotting disorder (H)      Cerebral infarction (H)      Depressive disorder      Migraines      Uncomplicated asthma        PSH: Bilateral breast reduction in April 2019    FamH: No family history on file.    SocH:   Social History     Tobacco Use     Smoking status: Never Smoker     Smokeless tobacco: Never Used   Substance Use Topics     Alcohol use: Never     Alcohol/week: 0.0 oz     Drug use: Never     Patient currently unemployed      Medications:   Current Outpatient Medications  "  Medication     ALBUTEROL 108 (90 BASE) MCG/ACT inhaler     Budesonide-Formoterol Fumarate (SYMBICORT IN)     cyclobenzaprine (FLEXERIL) 5 MG tablet     DULERA 200-5 MCG/ACT oral inhaler     EPINEPHrine (EPIPEN) 0.3 MG/0.3ML injection     GNP ASPIRIN 325 MG EC tablet     GNP BISA-LAX 5 MG EC tablet     GNP SENNA-LAX 8.6 MG tablet     GNP STOOL SOFTENER 100 MG capsule     hydroxyurea (HYDREA) 500 MG capsule CHEMO     hydrOXYzine (ATARAX) 25 MG tablet     ibuprofen (ADVIL,MOTRIN) 200 MG tablet     JADENU 360 MG tablet     levonorgestrel (LILETTA, 52 MG,) 19.5 MCG/DAY IUD     lidocaine (LIDODERM) 5 % patch     lidocaine-prilocaine (EMLA) cream     LORazepam (ATIVAN) 1 MG tablet     oxyCODONE (ROXICODONE) 10 MG immediate release tablet     oxyCODONE (ROXICODONE) 5 MG immediate release tablet     Pregabalin (LYRICA PO)     Q- MG tablet     sertraline (ZOLOFT) 50 MG tablet     sodium phosphate (FLEET ENEMA) 7-19 GM/118ML rectal enema     SUMAtriptan (IMITREX) 100 MG tablet     traMADol (ULTRAM) 50 MG tablet     UNABLE TO FIND     No current facility-administered medications for this visit.        Allergies:     Allergies   Allergen Reactions     Fish-Derived Products Hives     Seafood Hives     Diagnostic X-Ray Materials      PN: sickle cell     Fish Allergy      Gadolinium Other (See Comments)     Tongue swelling         Physical Exam:   /63   Pulse 95   Temp 98.3  F (36.8  C)   Resp 17   Ht 1.66 m (5' 5.35\")   Wt 65.3 kg (144 lb)   SpO2 98%   BMI 23.70 kg/m       Constitutional: The patient was unaccompanied, well-groomed, and in no acute distress.    Head: Normocephalic and atraumatic.   Eyes: Pupils were equal and reactive.  Extraocular movement intact.    Ears: Pinnae and tragus non-tender.  EACs and TMs were clear under microscopic exam.   Nose: Sinuses were non-tender.  Anterior rhinoscopy revealed midline septum and absence of purulence or polyps.    Mouth: Mucosa pink and moist, tonsils " non-erythematous, no exudates, right tonsil 3+, left tonsil 3+ (see image below), uvula midline  Neck: No lymphadenopathy in the neck.  No palpable thyroid.  Normal range of motion  Respiratory: Breathing comfortably without stridor or exertion of accessory muscles.   Skin: Normal:  warm and pink without rash  Neurologic: Alert and oriented x 3.  CN's III-XII within normal limits.  Voice normal.   Psychiatric: The patient's affect was calm, cooperative, and appropriate.    Communication: Normal; communicates verbally, normal voice quality.          Fiberoptic Endoscopy:  Consent for fiberoptic laryngoscopy was obtained, and we confirmed correctness of procedure and identity of patient.  Fiberoptic laryngoscopy was indicated due to history of snoring, enlarged tonsils and request for tonsillectomy.  The nose was topically decongested and anesthetized.  The fiberoptic laryngoscope was passed under endoscopic vision.  The turbinates were normal.  The inferior and middle meati were clear bilaterally without purulence, masses, or polyps.  Enlargement of adenoids noted.  The Eustachian tubes were clear.  The soft palate appeared normal with good mobility.  The epiglottis was sharp and the visualized portion of the vallecula was clear, but there was lymphoid tissue present in the left pyriform recess.  The larynx was clear with mobile cords.  The arytenoids were clear and there was no pooling in the hypopharynx.   Left lingula tonsil enlarged, with excess lymphoid issues inferior to this along the left pharyngeal wall.      Adenoids        Picture of left lingular tonsil and excess lymphoid tissue          Video of endoscopy (please click on image above to watch)        Assessment/Plan:   1. Bilaterally tonsillar enlargement, enlarged adenoids.  Patient with significantly enlarged tonsils and adenoids.  Likely suffering from sleep apnea as part of this enlargement.  Endoscopy done due to the history of the ear pain with  symptoms of tonsillitis.  She does have enlarged adenoid tissue and other lymphoid tissue as well.    The patient does meet criteria for tonsillectomy.  We did discuss the risks of tonsillectomy and an adult, which include increased pain and increased bleeding risk.  Given her history of sickle cell, she is also at increased risk of potential stroke because she would need to be off her aspirin for at least 1 week prior to his surgery.  Patient verbalized her understanding of this.  She knows she will have to discuss this more with the surgeon as well as her doctor who manages her sickle cell disease.    At this point, I think is appropriate to refer her to 1 of our surgeons to discuss a tonsillectomy.  I also will refer her for a sleep study as I think she likely has some underlying sleep apnea.  She will return to see me only on as as needed basis.      2.  Snoring.  See #1 above.        Dee Dee Sevilla PA-C  Otolaryngology  Head & Neck Surgery  [phone]      CC:  Farhana Guy MD  Otolaryngology & Sleep Surgery  [facility] 961

## 2019-08-30 ENCOUNTER — TRANSFERRED RECORDS (OUTPATIENT)
Dept: HEALTH INFORMATION MANAGEMENT | Facility: CLINIC | Age: 19
End: 2019-08-30

## 2019-08-30 ASSESSMENT — PATIENT HEALTH QUESTIONNAIRE - PHQ9: SUM OF ALL RESPONSES TO PHQ QUESTIONS 1-9: 6

## 2019-09-05 ENCOUNTER — OFFICE VISIT (OUTPATIENT)
Dept: OTOLARYNGOLOGY | Facility: CLINIC | Age: 19
End: 2019-09-05
Attending: PHYSICIAN ASSISTANT
Payer: COMMERCIAL

## 2019-09-05 ENCOUNTER — PRE VISIT (OUTPATIENT)
Dept: OTOLARYNGOLOGY | Facility: CLINIC | Age: 19
End: 2019-09-05

## 2019-09-05 ENCOUNTER — TELEPHONE (OUTPATIENT)
Dept: OTOLARYNGOLOGY | Facility: CLINIC | Age: 19
End: 2019-09-05

## 2019-09-05 VITALS — HEIGHT: 65 IN | WEIGHT: 144 LBS | BODY MASS INDEX: 23.99 KG/M2

## 2019-09-05 DIAGNOSIS — J03.01 ACUTE RECURRENT STREPTOCOCCAL TONSILLITIS: Primary | ICD-10-CM

## 2019-09-05 RX ORDER — CEFAZOLIN SODIUM 1 G/50ML
1 INJECTION, SOLUTION INTRAVENOUS SEE ADMIN INSTRUCTIONS
Status: CANCELLED | OUTPATIENT
Start: 2019-09-05

## 2019-09-05 RX ORDER — CEFAZOLIN SODIUM 2 G/50ML
2 SOLUTION INTRAVENOUS
Status: CANCELLED | OUTPATIENT
Start: 2019-09-05

## 2019-09-05 ASSESSMENT — MIFFLIN-ST. JEOR: SCORE: 1429.61

## 2019-09-05 NOTE — TELEPHONE ENCOUNTER
Terence discuss with Dr. Collins and patient.  He states patient wants Dr. Collins to manage her care.  He is wondering if Dr. Guy has privileges at [facility].  Terence states he will coordinate with other providers and get back to us.  He instructed me to only call him if Dr. Guy has privileges.  Terence will keep us posted after he coordinates with ortho.

## 2019-09-05 NOTE — LETTER
2019       RE: Jennifer Cervantes  [address]  [facility] [address]     Dear Colleague,    Thank you for referring your patient, Jennifer Cervatnes, to the [facility] EAR NOSE AND THROAT at [facility]. Please see a copy of my visit note below.    Otolaryngology Adult Consultation    Patient: Jennifer Cervantes  : 2000      HPI:  Jennifer Cervantes is a 19 year old female seen today in the Otolaryngology Clinic for recurrent tonsillitis and tonsillar hypertrophy.  Patient reports that she has had issues with recurrent tonsillitis all her life.  Her past medical history is significant for sickle cell disease.  Additionally she does have issues with chronic pain mostly in her legs.  Her last sickle cell flare was about 2 months ago she reports.  She had a chronic pain flare about 2 weeks ago.  She typically does take oxycodone for these pain flares.  Patient reports that she has had 5 bouts of tonsillitis since January.  These are typically characterized by sore throat and difficulty with swallowing and more tonsillar swelling.  Additionally she is noticed that she has been snoring a lot more.  Her tonsils feel larger to her.  She typically has about 3-5 infections of tonsillitis per year.  This is been happening since her childhood.    Of note patient has had had a history of stroke when she was 1 years old.  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  She has not a lot of functional movement of her right limb.  She does see orthopedics, Dr. Franco, who is considering doing some sort of release surgery for her right limb.  Her sickle cell doctor at [facility] would prefer to have the surgeries combined and to be done at [facility].        Medications:  Current Outpatient Rx   Medication Sig Dispense Refill     ALBUTEROL 108 (90 BASE) MCG/ACT inhaler        Budesonide-Formoterol Fumarate (SYMBICORT IN) Inhale  into the lungs.       cyclobenzaprine (FLEXERIL) 5 MG tablet        DULERA  200-5 MCG/ACT oral inhaler        EPINEPHrine (EPIPEN) 0.3 MG/0.3ML injection        GNP ASPIRIN 325 MG EC tablet        GNP BISA-LAX 5 MG EC tablet        GNP SENNA-LAX 8.6 MG tablet        GNP STOOL SOFTENER 100 MG capsule        hydroxyurea (HYDREA) 500 MG capsule CHEMO        hydrOXYzine (ATARAX) 25 MG tablet        ibuprofen (ADVIL,MOTRIN) 200 MG tablet        JADENU 360 MG tablet        levonorgestrel (LILETTA, 52 MG,) 19.5 MCG/DAY IUD        lidocaine (LIDODERM) 5 % patch        lidocaine-prilocaine (EMLA) cream        LORazepam (ATIVAN) 1 MG tablet        oxyCODONE (ROXICODONE) 10 MG immediate release tablet        oxyCODONE (ROXICODONE) 5 MG immediate release tablet        Pregabalin (LYRICA PO) Take by mouth 2 times daily       Q- MG tablet        sertraline (ZOLOFT) 50 MG tablet        sodium phosphate (FLEET ENEMA) 7-19 GM/118ML rectal enema        SUMAtriptan (IMITREX) 100 MG tablet        traMADol (ULTRAM) 50 MG tablet        UNABLE TO FIND Hydreauxoria? - Sickle cell med         Allergies: Fish-derived products; Seafood; Diagnostic x-ray materials; Fish allergy; and Gadolinium     PMH:  Past Medical History:   Diagnosis Date     Anemia      Anxiety      Bleeding disorder (H)      Blood clotting disorder (H)      Cerebral infarction (H)      Depressive disorder      Migraines      Uncomplicated asthma        PSH:  No past surgical history on file.    FH:  No family history on file.     SH:  Social History     Tobacco Use     Smoking status: Never Smoker     Smokeless tobacco: Never Used   Substance Use Topics     Alcohol use: Never     Alcohol/week: 0.0 oz     Drug use: Never       Review of Systems  [facility] ROS 8/29/2019   Constitutional Weight gain, Weight loss, Appetite change, Unexplained fatigue, Problems with sleep   Neurology Headache   Psychology Frequently feeling depressed or sad, Frequently feeling anxious   Cardiopulmonary Breathing problems   Endocrine Thirst       Physical Exam:     GEN:  The patient is alert, oriented and in no acute distress.  HEAD:  Head, face scalp is grossly normal.  ORAL:  Oral cavity shows healthy mucosa with out ulceration, masses or other lesions                involving the tongue, palate, buccal mucosa, floor of mouth or gingiva. Tonsils 3.5+  NECK:   Neck is without adenopathy, thyroid or salivary gland masses.  PUL: normal work of breathing; no stridor  CV: RRR; no peripheral edema  M/S: normal muscle tone     Assessment/Plan: Patient reports history of chronic and recurrent tonsillitis with tonsillar hypertrophy.  Given the history of sickle cell disease her recurrent tonsillitis certainly present risk factor for sickle cell crisis.  I do think it reasonable to consider a tonsillectomy.Risks of surgery were discussed with the patient ,which include sever pain, bleeding 7-10 days after surgery (~8% risk), tongue numbness, tongue swelling, taste change, VPI, and nasopharyngeal stenosis. Expected post-operative recovery was also discussed and includes soft diet, pain control, and time off of work (7-14 days).    Patient reports that her sickle cell doctor is at [facility].  They were hoping to coordinate so that the orthopedic surgery for right upper limb as well as tonsillectomy And underwent anesthesia.  An ultrasound likely wanted to be done at [facility].  However I do not have any privileges there.  I do have privileges at [facility].  We will first contact Dr. Rivas he is to nurse and see if we can coordinate a time and then we will have to potentially have the patient transfer her care to the [facility] so that if there needs to be preadmission testing such as blood transfusion which the patient mentions that she has had many times before any surgery as well as postoperative hospitalization it can be done through [facility].    I spent a total of 35 minutes  face-to-face with Jennifer Cervantes during today's office visit.  Over 50% of this time was spent counseling the patient on and/or coordinating care as documented in my assessment and plan.        Again, thank you for allowing me to participate in the care of your patient.      Sincerely,    Farhana Guy MD

## 2019-09-05 NOTE — PROGRESS NOTES
Otolaryngology Adult Consultation    Patient: Jennifer Cervantes  : 2000      HPI:  Jennifer Cervantes is a 19 year old female seen today in the Otolaryngology Clinic for recurrent tonsillitis and tonsillar hypertrophy.  Patient reports that she has had issues with recurrent tonsillitis all her life.  Her past medical history is significant for sickle cell disease.  Additionally she does have issues with chronic pain mostly in her legs.  Her last sickle cell flare was about 2 months ago she reports.  She had a chronic pain flare about 2 weeks ago.  She typically does take oxycodone for these pain flares.  Patient reports that she has had 5 bouts of tonsillitis since January.  These are typically characterized by sore throat and difficulty with swallowing and more tonsillar swelling.  Additionally she is noticed that she has been snoring a lot more.  Her tonsils feel larger to her.  She typically has about 3-5 infections of tonsillitis per year.  This is been happening since her childhood.    Of note patient has had had a history of stroke when she was 1 years old.  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  She has not a lot of functional movement of her right limb.  She does see orthopedics, Dr. Franco, who is considering doing some sort of release surgery for her right limb.  Her sickle cell doctor at [facility] would prefer to have the surgeries combined and to be done at [facility].        Medications:  Current Outpatient Rx   Medication Sig Dispense Refill     ALBUTEROL 108 (90 BASE) MCG/ACT inhaler        Budesonide-Formoterol Fumarate (SYMBICORT IN) Inhale  into the lungs.       cyclobenzaprine (FLEXERIL) 5 MG tablet        DULERA 200-5 MCG/ACT oral inhaler        EPINEPHrine (EPIPEN) 0.3 MG/0.3ML injection        GNP ASPIRIN 325 MG EC tablet        GNP BISA-LAX 5 MG EC tablet        GNP SENNA-LAX 8.6 MG tablet        GNP STOOL SOFTENER 100 MG capsule        hydroxyurea (HYDREA) 500 MG capsule CHEMO         hydrOXYzine (ATARAX) 25 MG tablet        ibuprofen (ADVIL,MOTRIN) 200 MG tablet        JADENU 360 MG tablet        levonorgestrel (LILETTA, 52 MG,) 19.5 MCG/DAY IUD        lidocaine (LIDODERM) 5 % patch        lidocaine-prilocaine (EMLA) cream        LORazepam (ATIVAN) 1 MG tablet        oxyCODONE (ROXICODONE) 10 MG immediate release tablet        oxyCODONE (ROXICODONE) 5 MG immediate release tablet        Pregabalin (LYRICA PO) Take by mouth 2 times daily       Q- MG tablet        sertraline (ZOLOFT) 50 MG tablet        sodium phosphate (FLEET ENEMA) 7-19 GM/118ML rectal enema        SUMAtriptan (IMITREX) 100 MG tablet        traMADol (ULTRAM) 50 MG tablet        UNABLE TO FIND Hydreauxoria? - Sickle cell med         Allergies: Fish-derived products; Seafood; Diagnostic x-ray materials; Fish allergy; and Gadolinium     PMH:  Past Medical History:   Diagnosis Date     Anemia      Anxiety      Bleeding disorder (H)      Blood clotting disorder (H)      Cerebral infarction (H)      Depressive disorder      Migraines      Uncomplicated asthma        PSH:  No past surgical history on file.    FH:  No family history on file.     SH:  Social History     Tobacco Use     Smoking status: Never Smoker     Smokeless tobacco: Never Used   Substance Use Topics     Alcohol use: Never     Alcohol/week: 0.0 oz     Drug use: Never       Review of Systems   ENT ROS 8/29/2019   Constitutional Weight gain, Weight loss, Appetite change, Unexplained fatigue, Problems with sleep   Neurology Headache   Psychology Frequently feeling depressed or sad, Frequently feeling anxious   Cardiopulmonary Breathing problems   Endocrine Thirst       Physical Exam:    GEN:  The patient is alert, oriented and in no acute distress.  HEAD:  Head, face scalp is grossly normal.  ORAL:  Oral cavity shows healthy mucosa with out ulceration, masses or other lesions                involving the tongue, palate, buccal mucosa, floor of mouth or gingiva.  Tonsils 3.5+  NECK:   Neck is without adenopathy, thyroid or salivary gland masses.  PUL: normal work of breathing; no stridor  CV: RRR; no peripheral edema  M/S: normal muscle tone     Assessment/Plan: Patient reports history of chronic and recurrent tonsillitis with tonsillar hypertrophy.  Given the history of sickle cell disease her recurrent tonsillitis certainly present risk factor for sickle cell crisis.  I do think it reasonable to consider a tonsillectomy.Risks of surgery were discussed with the patient ,which include sever pain, bleeding 7-10 days after surgery (~8% risk), tongue numbness, tongue swelling, taste change, VPI, and nasopharyngeal stenosis. Expected post-operative recovery was also discussed and includes soft diet, pain control, and time off of work (7-14 days).    Patient reports that her sickle cell doctor is at [facility].  They were hoping to coordinate so that the orthopedic surgery for right upper limb as well as tonsillectomy And underwent anesthesia.  An ultrasound likely wanted to be done at [facility].  However I do not have any privileges there.  I do have privileges at [facility].  We will first contact Dr. Rivas he is to nurse and see if we can coordinate a time and then we will have to potentially have the patient transfer her care to the [facility] so that if there needs to be preadmission testing such as blood transfusion which the patient mentions that she has had many times before any surgery as well as postoperative hospitalization it can be done through [facility].    I spent a total of 35 minutes face-to-face with Jennifer Cervantes during today's office visit.  Over 50% of this time was spent counseling the patient on and/or coordinating care as documented in my assessment and plan.

## 2019-09-05 NOTE — NURSING NOTE
In basket message was sent to Dr. Franco, Stacey, RN and Ally RN.    S-Assisting with coordinating Dr. Franco surgical intervention and Dr. Guy tonsillectomy.     Patient understood that surgery will be completed at [facility] as that is where her sickle cell disease is managed.     Dr. Guy does not have privileges at [facility].     B-Dx Sickle Cell Disease, managed by Dr. Jamaal Collins at [facility] [phone]. [address]     R-Dr. Franco, where are you anticipating on having the surgery?     If you do have privilege at [facility], patient may need to explore a different ENT provider.     If you are having surgery here at [facility]?, Dr. Guy is able to complete; however, patient will need someone to manage her sickle cell unless Dr. Jamaal Collins is able to manage here.     I left a voice message with Dr. Collins.     Please do reach out to ENT department to assist with coordination.   Thank you

## 2019-09-05 NOTE — TELEPHONE ENCOUNTER
Terence, Dr. Collins's nurse, return call and stated he will discuss and clarify with Dr. Collins pertaining to concerns below.  If procedure is okay to be completed here, I encouraged Terence to see whom Dr. Collins would recommend to manage.        In basket message was sent to Dr. Franco, Stacey, RN and Ally RN.     S-Assisting with coordinating Dr. Franco surgical intervention and Dr. Guy tonsillectomy.     Patient understood that surgery will be completed at [facility] as that is where her sickle cell disease is managed.     Dr. Guy does not have privileges at [facility].     B-Dx Sickle Cell Disease, managed by Dr. Jamaal Collins at [facility] [phone]. [address]     R-Dr. Franco, where are you anticipating on having the surgery?     If you do have privilege at [facility], patient may need to explore a different ENT provider.     If you are having surgery here at [facility]?, Dr. Guy is able to complete; however, patient will need someone to manage her sickle cell unless Dr. Jamaal Collins is able to manage here.     I left a voice message with Dr. Collins.     Please do reach out to ENT department to assist with coordination.   Thank you

## 2019-09-10 NOTE — TELEPHONE ENCOUNTER
Jennifer called wondering about up dates.  I informed her about my conversation with Terence and encouraged her to follow up with him.  She is willing to complete surgeries at [facility] and find another provider to manage her sickle cell.  She will discuss with Terence and get back to team on what she decides.

## 2019-09-11 ENCOUNTER — TRANSFERRED RECORDS (OUTPATIENT)
Dept: HEALTH INFORMATION MANAGEMENT | Facility: CLINIC | Age: 19
End: 2019-09-11

## 2019-09-12 DIAGNOSIS — I63.9 CVA (CEREBRAL VASCULAR ACCIDENT) (H): ICD-10-CM

## 2019-09-12 DIAGNOSIS — G81.11 RIGHT SPASTIC HEMIPLEGIA (H): Primary | ICD-10-CM

## 2019-09-12 DIAGNOSIS — D57.1 SICKLE CELL DISEASE (H): ICD-10-CM

## 2019-09-12 PROBLEM — K59.00 CONSTIPATION: Status: ACTIVE | Noted: 2019-05-03

## 2019-09-12 PROBLEM — F41.9 ANXIETY: Status: ACTIVE | Noted: 2019-05-03

## 2019-09-13 ENCOUNTER — TRANSFERRED RECORDS (OUTPATIENT)
Dept: HEALTH INFORMATION MANAGEMENT | Facility: CLINIC | Age: 19
End: 2019-09-13

## 2019-09-17 ENCOUNTER — DOCUMENTATION ONLY (OUTPATIENT)
Dept: OTOLARYNGOLOGY | Facility: CLINIC | Age: 19
End: 2019-09-17

## 2019-09-17 ENCOUNTER — HOSPITAL ENCOUNTER (INPATIENT)
Facility: CLINIC | Age: 19
Setting detail: SURGERY ADMIT
DRG: 500 | End: 2019-09-17
Attending: OTOLARYNGOLOGY | Admitting: OTOLARYNGOLOGY
Payer: COMMERCIAL

## 2019-09-17 NOTE — PROGRESS NOTES
[facility]:  Care Coordination Note     SITUATION   Jennifer Cervantes is a 19 year old female who is receiving support for:  Clinic Care Coordination - Follow-up  RN calling Stacey, the care coordinator for Dr. Franco to discuss orthopedic and ENT surgery coordination for the patient. Per Dr. Guy this patient's hospital admission will need to be managed by the patient's sickle cell team. Pain management following the surgery will also need to be managed by this team.     BACKGROUND     19 year old female seen in the Otolaryngology Clinic with Dr. Guy on 9/05/2019 for recurrent tonsillitis and tonsillar hypertrophy. Her past medical history is significant for sickle cell disease. Dr. Guy concluded a bilateral tonsillectomy is warranted. Due to her extensive medical history this patient requires close monitoring by her sick cell team. The surgery has been coordinated to be completed on 10/02 at [facility] with Dr. Guy and Dr. Franco.     ASSESSMENT     No assessment completed    PLAN          Nursing Interventions: Pre-admission plans will be coordinated by pediatric hematologist Court Michel. The patient's pain management will also need to be coordinated by this team per Dr. Guy.       Follow-up plan:  A consult with pediatric hematologist Court Michel is warranted prior to surgery. RN has reached out to Stacey, the care coordinator for Dr. Franco to relay this information. RN awaiting a reply for a finalized plan.     JOE Graves, JOE

## 2019-09-18 ENCOUNTER — PRE VISIT (OUTPATIENT)
Dept: SLEEP MEDICINE | Facility: CLINIC | Age: 19
End: 2019-09-18

## 2019-09-25 ENCOUNTER — OFFICE VISIT (OUTPATIENT)
Dept: PEDIATRIC HEMATOLOGY/ONCOLOGY | Facility: CLINIC | Age: 19
End: 2019-09-25
Attending: NURSE PRACTITIONER
Payer: COMMERCIAL

## 2019-09-25 VITALS
TEMPERATURE: 97.4 F | HEART RATE: 74 BPM | BODY MASS INDEX: 24.02 KG/M2 | HEIGHT: 65 IN | RESPIRATION RATE: 24 BRPM | WEIGHT: 144.18 LBS | SYSTOLIC BLOOD PRESSURE: 112 MMHG | DIASTOLIC BLOOD PRESSURE: 73 MMHG | OXYGEN SATURATION: 98 %

## 2019-09-25 DIAGNOSIS — D57.1 HB-SS DISEASE WITHOUT CRISIS (H): Primary | ICD-10-CM

## 2019-09-25 PROCEDURE — 25000128 H RX IP 250 OP 636: Mod: ZF | Performed by: NURSE PRACTITIONER

## 2019-09-25 PROCEDURE — G0463 HOSPITAL OUTPT CLINIC VISIT: HCPCS | Mod: 25

## 2019-09-25 PROCEDURE — G0008 ADMIN INFLUENZA VIRUS VAC: HCPCS

## 2019-09-25 PROCEDURE — 90686 IIV4 VACC NO PRSV 0.5 ML IM: CPT | Mod: ZF | Performed by: NURSE PRACTITIONER

## 2019-09-25 RX ORDER — ERYTHROMYCIN ETHYLSUCCINATE 400 MG/1
400 TABLET ORAL 3 TIMES DAILY
COMMUNITY
Start: 2019-09-25 | End: 2020-08-17

## 2019-09-25 RX ORDER — ALBUTEROL SULFATE 90 UG/1
2 AEROSOL, METERED RESPIRATORY (INHALATION) EVERY 4 HOURS PRN
Status: ON HOLD | COMMUNITY
Start: 2019-09-25 | End: 2020-04-11

## 2019-09-25 RX ORDER — GABAPENTIN 300 MG/1
600 CAPSULE ORAL 3 TIMES DAILY
Status: ON HOLD | COMMUNITY
Start: 2019-09-25 | End: 2019-10-12

## 2019-09-25 RX ORDER — DEFERASIROX 180 MG/1
180 TABLET, FILM COATED ORAL DAILY
Status: ON HOLD | COMMUNITY
Start: 2019-09-25 | End: 2020-04-11

## 2019-09-25 RX ORDER — DEFERASIROX 360 MG/1
720 TABLET, FILM COATED ORAL DAILY
COMMUNITY
Start: 2019-09-25 | End: 2020-04-24

## 2019-09-25 RX ORDER — HYDROXYUREA 500 MG/1
2000 CAPSULE ORAL DAILY
COMMUNITY
Start: 2019-09-25 | End: 2020-05-08

## 2019-09-25 RX ORDER — OXYCODONE HYDROCHLORIDE 10 MG/1
10 TABLET ORAL EVERY 6 HOURS PRN
Status: ON HOLD | COMMUNITY
Start: 2019-09-25 | End: 2019-10-12

## 2019-09-25 RX ORDER — OMEPRAZOLE 40 MG/1
40 CAPSULE, DELAYED RELEASE ORAL DAILY
COMMUNITY
Start: 2019-09-25 | End: 2020-05-22

## 2019-09-25 RX ORDER — ACETAMINOPHEN 325 MG/1
650 TABLET ORAL EVERY 6 HOURS PRN
COMMUNITY
Start: 2019-09-25 | End: 2020-11-20

## 2019-09-25 RX ADMIN — INFLUENZA A VIRUS A/BRISBANE/02/2018 IVR-190 (H1N1) ANTIGEN (FORMALDEHYDE INACTIVATED), INFLUENZA A VIRUS A/KANSAS/14/2017 X-327 (H3N2) ANTIGEN (FORMALDEHYDE INACTIVATED), INFLUENZA B VIRUS B/PHUKET/3073/2013 ANTIGEN (FORMALDEHYDE INACTIVATED), AND INFLUENZA B VIRUS B/MARYLAND/15/2016 BX-69A ANTIGEN (FORMALDEHYDE INACTIVATED) 0.5 ML: 15; 15; 15; 15 INJECTION, SUSPENSION INTRAMUSCULAR at 10:33

## 2019-09-25 ASSESSMENT — MIFFLIN-ST. JEOR: SCORE: 1417.38

## 2019-09-25 NOTE — NURSING NOTE
"Chief Complaint   Patient presents with     RECHECK     Patient is here tdoay for SCD follow up     /73 (BP Location: Left arm, Patient Position: Fowlers, Cuff Size: Adult Regular)   Pulse 74   Temp 97.4  F (36.3  C) (Oral)   Resp 24   Ht 1.639 m (5' 4.53\")   Wt 65.4 kg (144 lb 2.9 oz)   SpO2 98%   BMI 24.35 kg/m      Jesica Luis LPN  September 25, 2019    "

## 2019-09-25 NOTE — LETTER
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 Dr. Jamaal Collins is her primary hematologist. Jennifer presents unaccompanied to [facility] today for hematology consultation as she is scheduled for surgical procedures next week (10/2/19) with Dr. Guy (tonsillectomy and possible adenoidectomy) and Dr. Franco (right elbow flexor lengthening, flexor pronator slide of wrist and finger & thumb adductor lengthening).     HPI:   Medical records and \"Surgical Treatment Recommendations\" were provided to us by Jennifer's SCD team at [facility] (will be scanned into EMR). Jennifer is doing well at present. She is scheduled for her next planned transfusion at [facility] on Monday, 9/30/19. She has a port that is utilized for this. Jennifer expresses that she feels a little nervous about having her jackelyn-operative care here at the [facility] given she's historically had all her care at [facility]. She would like us to know that while her mom won't likely be at her bedside (\"it's too hard for her to see me when I'm in pain\"), she would like her mom (Rohini, [phone]) updated while she's hospitalized. Jennifer would also like to share with us that she doesn't want to be perceived as a \"drug seeker\", but that because of her SCD she has a \"high pain tolerance\" and that it typically takes her higher doses to achieve pain relief. She sees a pain psychologist and has a pain plan. Jennifer feels good today and hasn't been sick recently. She denies fevers, cough and respiratory symptoms. She uses inhalers for asthma (symbicort daily as controller and albuterol PRN). She has not had problems with anesthesia in the past. She reports that the " surgeon's told her she doesn't need to hold her aspirin.     Review of systems:  General:   Good appetite, strong energy level, sleeping well.  No fever. .  HEENT:  No changes in vision or hearing.  No headache at present.  No rhinorrhea.     Respiratory: No SOB.  No coughing or wheezing.  Cardiovascular: No chest pain or palpitations.  Endocrine:  No hot/cold intolerance.  No increase thirst or urination.  GI:  No nausea, vomiting, diarrhea or constipation.  No abdominal pain.  : No difficulty with urination.  Skin: No rashes, bruises, petechiae or other skin lesions noted.  Neuro: No new weakness or numbness. + right hemiparesis.  MSK:  No recent change in ROM or function.    PMH:   Past Medical History:   Diagnosis Date     Anemia      Anxiety      Bleeding disorder (H)      Blood clotting disorder (H)      Cerebral infarction (H)      Depressive disorder      Migraines      Uncomplicated asthma    HbSS disease, diagnosed as an infant  Left MCA stroke with right hemiparesis & RUE contracture, January 2001. Chronic transfusion therapy until 2005.  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 She does not have a relationship with her father.      Current Medications:   Current Outpatient Medications   Medication Sig Dispense Refill     acetaminophen (TYLENOL) 325 MG tablet Take 2 tablets (650 mg) by mouth every 6 hours as needed for mild pain       aspirin (ASA) 81 MG tablet Take 1 tablet (81 mg) by mouth daily       Budesonide-Formoterol Fumarate (SYMBICORT IN) Inhale  into the lungs.       deferasirox (JADENU) 180 MG tablet Take 1 tablet (180 mg) by mouth daily With two 360mg tabs for a total daily dose of 900mg       EPINEPHrine (EPIPEN) 0.3 MG/0.3ML injection        erythromycin ethylsuccinate (E.E.S.) 400 MG tablet Take 1 tablet (400 mg) by mouth 3 times daily Before meals       gabapentin (NEURONTIN) 300 MG capsule Take 2 capsules (600 mg) by mouth 3 times daily       GNP SENNA-LAX 8.6 MG tablet Take 1 tablet by mouth 2 times daily as needed for constipation       hydroxyurea (HYDREA) 500 MG capsule CHEMO Take 4 capsules (2,000 mg) by mouth daily       JADENU 360 MG tablet Take 2 tablets (720 mg) by mouth daily With one 180mg tab for a total daily dose of 900mg       omeprazole (PRILOSEC) 40 MG DR capsule Take 1 capsule (40 mg) by mouth daily       oxyCODONE IR (ROXICODONE) 10 MG tablet Take 1 tablet (10 mg) by mouth every 6 hours as needed for severe pain       PROAIR  (90 Base) MCG/ACT inhaler Inhale 2 puffs into the lungs every 4 hours as needed for shortness of breath / dyspnea or wheezing       sertraline (ZOLOFT) 50 MG tablet        levonorgestrel (LILETTA, 52 MG,) 19.5 MCG/DAY IUD      Depo-provera  The above medications were reviewed with Jennifer Cervantes &/or family, and Jennifer Cervantes has not missed any doses.   Recently switched from dilaudid to oxycodone due to better relief  She has NOT received flu shot for 3988-5461    Physical Exam:   Temp:  [97.4  F (36.3  C)] 97.4  F (36.3  C)  Pulse:  [74] 74  Resp:  [24] 24  BP: (112)/(73) 112/73  SpO2:  [98 %] 98 %  Wt Readings from Last 2 Encounters:   09/25/19 65.4 kg (144 lb 2.9 oz)    09/05/19 65.3 kg (144 lb)    General: Jennifer Cervantes is alert, interactive and appropriate for age throughout exam.   HEENT: Skull is atraumatic and normocephalic. PERRLA, sclera are non icteric and not injected, EOM are intact.  Nares are patent without drainage.  Oropharynx is clear without exudate, erythema or lesions. Tonsils 3+/=. Tympanic membranes are opaque bilaterally with light reflex and landmarks present.  Lymph:  Neck is supple without lymphadenopathy.  There is no supraclavicular, axillary or inguinal lymphadenopathy palpated.  Cardiovascular:  HR is regular, S1, S2 no murmur.  Capillary refill is < 2 seconds.  There is no edema.  Respiratory: Respirations are easy.  Lungs are clear to auscultation through out.  No crackles or wheezes. No cough noted.   Gastrointestinal:  BS present in all quadrants.  Abdomen is soft and non-tender.  No hepatosplenomegaly or masses are palpated.  Genitourinary: Deferred.  Skin: No rashes, bruises or other skin lesions are noted. Port site on left chest is C/D/I. Well-healed scar from prior port on right chest. Well healed abdominal incisions.  Neurological/MSK:  Right hemiparesis with elbow and wrist at 90 degrees. Antalgic gait. A/O x 3.     Labs from 8/30/19:   WBC 14.3  Hgb 8.7  Platelets 259K  MCV 87  ANC 10.6  Ferritin 6654  ABO & Rh O positive  Antibody screen negative  Pregnancy test negative    Assessment:  Jennifer Cervantes is a 19 year old female with HbSS disease and a complex PMH including stroke resulting in right hemiparesis and TIA, chronic pain, anxiety and depression who is on chronic transfusion therapy for secondary stroke preventionas well as chelation due to transfusion-related iron-overload. Jennifer presents today for a hematologic consultation in preparation for her upcoming surgical procedures including tonsillectomy with possible adenoidectomy (Dr. Guy) and right elbow flexor lengthening, flexor pronator slide of wrist and finger & thumb adductor  nestor (Dr. Franco) on 10/2/19. She has historically received her medical care at [facility] with Dr. Jamaal Collins who has prepared a perioperative surgical plan to prevent SCD complications r/t upcoming anesthesia.      Plan:   1) Reviewed records from [facility] (including last hem/onc note from August, pain management plan, comprehensive sickle cell clinic note from May, pain/palliative clinic note from 9/13/19 & surgical treatment recommendations from 9/11/19) which we'll have scanned into EMR  2) Jennifer is scheduled for her next PRBC transfusion at [facility] on Monday, 9/30/19. Reviewed this with her which will be perfect timing in prep for surgery on 10/2/19  3) Per Dr. Collins's recommendation for perioperative period, plan for the following:   - Admit on 10/1/19 (day prior to surgery) for IVF via port with D5 1/2NS at 100ml/hr. We would recommend determining need for post-operative fluids based on IV/PO titrate.  - She will receive blood transfusion per above, plan to recheck labs (CBCdp, retic & CMP) upon admission. We would like to verify her Hgb is ~ 10 g/dL  - To prevent pain, start continuous infusion with dilaudid PCA for at least 24 hours post-operatively. This can be transitioned to oral oxycodone at time of discharge.   - If any concerns arise, her hematology team at [facility] can be reached at [phone]  - Additionally, Jennifer and I talked about having an incentive spirometer at bedside, consulting PT while admitted to get her moving as soon as able and PACCT consult on 10/1/19 in order to have pain management plan in place prior to surgery  - Jennifer would like her mom to get phone updates with her stay  - Following discharge, she should follow-up with Dr. Collins and his team's office  4) A tour of our hospital and the pre/post operative areas was given to Jennifer today to help relieve some of her concerns and provide some familiarity   5) Message sent to surgical teams to verify no need to hold aspirin  6)  Flu shot offered and given today in our clinic today  7) RTC next Tuesday (10/1) around 12:30-1pm followed by subsequent admission to [facility] unit 5     Total visit 2 hours which included 1 hour of face to face time followed by 1 hour of care coordination.     EITAN Grey CNP

## 2019-09-25 NOTE — PROGRESS NOTES
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 Dr. Jamaal Collins is her primary hematologist. Jennifer presents unaccompanied to [facility] today for hematology consultation as she is scheduled for surgical procedures next week (10/2/19) with Dr. Guy (tonsillectomy and possible adenoidectomy) and Dr. Franco (right elbow flexor lengthening, flexor pronator slide of wrist and finger & thumb adductor lengthening).     HPI:   Medical records and \"Surgical Treatment Recommendations\" were provided to us by Jennifer's SCD team at [facility] (will be scanned into EMR). Jennifer is doing well at present. She is scheduled for her next planned transfusion at [facility] on Monday, 9/30/19. She has a port that is utilized for this. Jennifer expresses that she feels a little nervous about having her jackelyn-operative care here at the [facility] given she's historically had all her care at [facility]. She would like us to know that while her mom won't likely be at her bedside (\"it's too hard for her to see me when I'm in pain\"), she would like her mom (Rohini, [phone]) updated while she's hospitalized. Jennifer would also like to share with us that she doesn't want to be perceived as a \"drug seeker\", but that because of her SCD she has a \"high pain tolerance\" and that it typically takes her higher doses to achieve pain relief. She sees a pain psychologist and has a pain plan. Jennifer feels good today and hasn't been sick recently. She denies fevers, cough and respiratory symptoms. She uses inhalers for asthma (symbicort daily as controller and albuterol PRN). She has not had problems with anesthesia in the past. She reports that the surgeon's told her she doesn't need to hold her aspirin.     Review of systems:  General:  "  Good appetite, strong energy level, sleeping well.  No fever. .  HEENT:  No changes in vision or hearing.  No headache at present.  No rhinorrhea.     Respiratory: No SOB.  No coughing or wheezing.  Cardiovascular: No chest pain or palpitations.  Endocrine:  No hot/cold intolerance.  No increase thirst or urination.  GI:  No nausea, vomiting, diarrhea or constipation.  No abdominal pain.  : No difficulty with urination.  Skin: No rashes, bruises, petechiae or other skin lesions noted.  Neuro: No new weakness or numbness. + right hemiparesis.  MSK:  No recent change in ROM or function.    PMH:   Past Medical History:   Diagnosis Date     Anemia      Anxiety      Bleeding disorder (H)      Blood clotting disorder (H)      Cerebral infarction (H)      Depressive disorder      Migraines      Uncomplicated asthma    HbSS disease, diagnosed as an infant  Left MCA stroke with right hemiparesis & RUE contracture, January 2001. Chronic transfusion therapy until 2005.  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 She does not have a relationship with her father.     Current Medications:   Current Outpatient Medications   Medication Sig Dispense Refill      acetaminophen (TYLENOL) 325 MG tablet Take 2 tablets (650 mg) by mouth every 6 hours as needed for mild pain       aspirin (ASA) 81 MG tablet Take 1 tablet (81 mg) by mouth daily       Budesonide-Formoterol Fumarate (SYMBICORT IN) Inhale  into the lungs.       deferasirox (JADENU) 180 MG tablet Take 1 tablet (180 mg) by mouth daily With two 360mg tabs for a total daily dose of 900mg       EPINEPHrine (EPIPEN) 0.3 MG/0.3ML injection        erythromycin ethylsuccinate (E.E.S.) 400 MG tablet Take 1 tablet (400 mg) by mouth 3 times daily Before meals       gabapentin (NEURONTIN) 300 MG capsule Take 2 capsules (600 mg) by mouth 3 times daily       GNP SENNA-LAX 8.6 MG tablet Take 1 tablet by mouth 2 times daily as needed for constipation       hydroxyurea (HYDREA) 500 MG capsule CHEMO Take 4 capsules (2,000 mg) by mouth daily       JADENU 360 MG tablet Take 2 tablets (720 mg) by mouth daily With one 180mg tab for a total daily dose of 900mg       omeprazole (PRILOSEC) 40 MG DR capsule Take 1 capsule (40 mg) by mouth daily       oxyCODONE IR (ROXICODONE) 10 MG tablet Take 1 tablet (10 mg) by mouth every 6 hours as needed for severe pain       PROAIR  (90 Base) MCG/ACT inhaler Inhale 2 puffs into the lungs every 4 hours as needed for shortness of breath / dyspnea or wheezing       sertraline (ZOLOFT) 50 MG tablet        levonorgestrel (LILETTA, 52 MG,) 19.5 MCG/DAY IUD      Depo-provera  The above medications were reviewed with Jennifer Cervantes &/or family, and Jennifer Cervantes has not missed any doses.   Recently switched from dilaudid to oxycodone due to better relief  She has NOT received flu shot for 1511-3196    Physical Exam:   Temp:  [97.4  F (36.3  C)] 97.4  F (36.3  C)  Pulse:  [74] 74  Resp:  [24] 24  BP: (112)/(73) 112/73  SpO2:  [98 %] 98 %  Wt Readings from Last 2 Encounters:   09/25/19 65.4 kg (144 lb 2.9 oz)   09/05/19 65.3 kg (144 lb)    General: Jennifer Cervantes is alert, interactive and appropriate for  age throughout exam.   HEENT: Skull is atraumatic and normocephalic. PERRLA, sclera are non icteric and not injected, EOM are intact.  Nares are patent without drainage.  Oropharynx is clear without exudate, erythema or lesions. Tonsils 3+/=. Tympanic membranes are opaque bilaterally with light reflex and landmarks present.  Lymph:  Neck is supple without lymphadenopathy.  There is no supraclavicular, axillary or inguinal lymphadenopathy palpated.  Cardiovascular:  HR is regular, S1, S2 no murmur.  Capillary refill is < 2 seconds.  There is no edema.  Respiratory: Respirations are easy.  Lungs are clear to auscultation through out.  No crackles or wheezes. No cough noted.   Gastrointestinal:  BS present in all quadrants.  Abdomen is soft and non-tender.  No hepatosplenomegaly or masses are palpated.  Genitourinary: Deferred.  Skin: No rashes, bruises or other skin lesions are noted. Port site on left chest is C/D/I. Well-healed scar from prior port on right chest. Well healed abdominal incisions.  Neurological/MSK:  Right hemiparesis with elbow and wrist at 90 degrees. Antalgic gait. A/O x 3.     Labs from 8/30/19:   WBC 14.3  Hgb 8.7  Platelets 259K  MCV 87  ANC 10.6  Ferritin 6654  ABO & Rh O positive  Antibody screen negative  Pregnancy test negative    Assessment:  Jennifer Cervantes is a 19 year old female with HbSS disease and a complex PMH including stroke resulting in right hemiparesis and TIA, chronic pain, anxiety and depression who is on chronic transfusion therapy for secondary stroke preventionas well as chelation due to transfusion-related iron-overload. Jennifer presents today for a hematologic consultation in preparation for her upcoming surgical procedures including tonsillectomy with possible adenoidectomy (Dr. Guy) and right elbow flexor lengthening, flexor pronator slide of wrist and finger & thumb adductor lengthening (Dr. Franco) on 10/2/19. She has historically received her medical care at [facility]  with Dr. Jamaal Collins who has prepared a perioperative surgical plan to prevent SCD complications r/t upcoming anesthesia.      Plan:   1) Reviewed records from [facility] (including last hem/onc note from August, pain management plan, comprehensive sickle cell clinic note from May, pain/palliative clinic note from 9/13/19 & surgical treatment recommendations from 9/11/19) which we'll have scanned into EMR  2) Jennifer is scheduled for her next PRBC transfusion at [facility] on Monday, 9/30/19. Reviewed this with her which will be perfect timing in prep for surgery on 10/2/19  3) Per Dr. Collins's recommendation for perioperative period, plan for the following:   - Admit on 10/1/19 (day prior to surgery) for IVF via port with D5 1/2NS at 100ml/hr. We would recommend determining need for post-operative fluids based on IV/PO titrate.  - She will receive blood transfusion per above, plan to recheck labs (CBCdp, retic & CMP) upon admission. We would like to verify her Hgb is ~ 10 g/dL  - To prevent pain, start continuous infusion with dilaudid PCA for at least 24 hours post-operatively. This can be transitioned to oral oxycodone at time of discharge.   - If any concerns arise, her hematology team at [facility] can be reached at [phone]  - Additionally, Jennifer and I talked about having an incentive spirometer at bedside, consulting PT while admitted to get her moving as soon as able and PACCT consult on 10/1/19 in order to have pain management plan in place prior to surgery  - Jennifer would like her mom to get phone updates with her stay  - Following discharge, she should follow-up with Dr. Collins and his team's office  4) A tour of our hospital and the pre/post operative areas was given to Jennifer today to help relieve some of her concerns and provide some familiarity   5) Message sent to surgical teams to verify no need to hold aspirin  6) Flu shot offered and given today in our clinic today  7) RTC next Tuesday (10/1) around  12:30-1pm followed by subsequent admission to [facility] unit 5     Total visit 2 hours which included 1 hour of face to face time followed by 1 hour of care coordination.

## 2019-09-25 NOTE — PATIENT INSTRUCTIONS
1) It was a pleasure to meet you today!  2) You will [facility]  3) Please come to the [facility] on Tues, 10/1 at 12:30ish and ask the  to page CATERINA Hager  4) Court will walk you to the hospital for admission at 1pm  5) Surgery is scheduled on 10/2 in the morning  6) We gave you the flu shot today

## 2019-09-30 RX ORDER — CEFAZOLIN SODIUM 2 G/100ML
2 INJECTION, SOLUTION INTRAVENOUS
Status: CANCELLED | OUTPATIENT
Start: 2019-10-02

## 2019-09-30 RX ORDER — CEFAZOLIN SODIUM 1 G/3ML
1 INJECTION, POWDER, FOR SOLUTION INTRAMUSCULAR; INTRAVENOUS SEE ADMIN INSTRUCTIONS
Status: CANCELLED | OUTPATIENT
Start: 2019-10-02

## 2019-10-01 ENCOUNTER — ANESTHESIA EVENT (OUTPATIENT)
Dept: SURGERY | Facility: CLINIC | Age: 19
DRG: 500 | End: 2019-10-01
Payer: COMMERCIAL

## 2019-10-01 ENCOUNTER — HOSPITAL ENCOUNTER (INPATIENT)
Facility: CLINIC | Age: 19
LOS: 12 days | Discharge: HOME | DRG: 500 | End: 2019-10-13
Attending: PEDIATRICS | Admitting: PEDIATRICS
Payer: COMMERCIAL

## 2019-10-01 ENCOUNTER — OFFICE VISIT (OUTPATIENT)
Dept: PEDIATRIC HEMATOLOGY/ONCOLOGY | Facility: CLINIC | Age: 19
DRG: 500 | End: 2019-10-01
Attending: NURSE PRACTITIONER
Payer: COMMERCIAL

## 2019-10-01 DIAGNOSIS — G89.18 ACUTE POST-OPERATIVE PAIN: ICD-10-CM

## 2019-10-01 DIAGNOSIS — G89.4 CHRONIC PAIN SYNDROME: Primary | ICD-10-CM

## 2019-10-01 DIAGNOSIS — D57.01 HB-SS DISEASE WITH ACUTE CHEST SYNDROME (H): ICD-10-CM

## 2019-10-01 DIAGNOSIS — D57.1 HB-SS DISEASE WITHOUT CRISIS (H): Primary | ICD-10-CM

## 2019-10-01 DIAGNOSIS — J03.01 ACUTE RECURRENT STREPTOCOCCAL TONSILLITIS: ICD-10-CM

## 2019-10-01 LAB
ALBUMIN SERPL-MCNC: 3.7 G/DL (ref 3.4–5)
ALP SERPL-CCNC: 66 U/L (ref 40–150)
ALT SERPL W P-5'-P-CCNC: 47 U/L (ref 0–50)
ANION GAP SERPL CALCULATED.3IONS-SCNC: 5 MMOL/L (ref 3–14)
AST SERPL W P-5'-P-CCNC: 47 U/L (ref 0–45)
BASOPHILS # BLD AUTO: 0.1 10E9/L (ref 0–0.2)
BASOPHILS NFR BLD AUTO: 1 %
BILIRUB SERPL-MCNC: 1.7 MG/DL (ref 0.2–1.3)
BUN SERPL-MCNC: 9 MG/DL (ref 7–30)
CALCIUM SERPL-MCNC: 7.7 MG/DL (ref 8.5–10.1)
CHLORIDE SERPL-SCNC: 111 MMOL/L (ref 94–109)
CO2 SERPL-SCNC: 24 MMOL/L (ref 20–32)
CREAT SERPL-MCNC: 0.61 MG/DL (ref 0.52–1.04)
DIFFERENTIAL METHOD BLD: ABNORMAL
EOSINOPHIL # BLD AUTO: 0.3 10E9/L (ref 0–0.7)
EOSINOPHIL NFR BLD AUTO: 2.1 %
ERYTHROCYTE [DISTWIDTH] IN BLOOD BY AUTOMATED COUNT: 19.8 % (ref 10–15)
GFR SERPL CREATININE-BSD FRML MDRD: >90 ML/MIN/{1.73_M2}
GLUCOSE SERPL-MCNC: 99 MG/DL (ref 70–99)
HCT VFR BLD AUTO: 34.8 % (ref 35–47)
HGB BLD-MCNC: 11.8 G/DL (ref 11.7–15.7)
IMM GRANULOCYTES # BLD: 0 10E9/L (ref 0–0.4)
IMM GRANULOCYTES NFR BLD: 0.3 %
LYMPHOCYTES # BLD AUTO: 2.9 10E9/L (ref 0.8–5.3)
LYMPHOCYTES NFR BLD AUTO: 22.1 %
MCH RBC QN AUTO: 29.4 PG (ref 26.5–33)
MCHC RBC AUTO-ENTMCNC: 33.9 G/DL (ref 31.5–36.5)
MCV RBC AUTO: 87 FL (ref 78–100)
MONOCYTES # BLD AUTO: 0.8 10E9/L (ref 0–1.3)
MONOCYTES NFR BLD AUTO: 6.2 %
NEUTROPHILS # BLD AUTO: 9 10E9/L (ref 1.6–8.3)
NEUTROPHILS NFR BLD AUTO: 68.3 %
NRBC # BLD AUTO: 0.1 10*3/UL
NRBC BLD AUTO-RTO: 1 /100
PLATELET # BLD AUTO: 205 10E9/L (ref 150–450)
POTASSIUM SERPL-SCNC: 3.7 MMOL/L (ref 3.4–5.3)
PROT SERPL-MCNC: 7.5 G/DL (ref 6.8–8.8)
RBC # BLD AUTO: 4.01 10E12/L (ref 3.8–5.2)
RETICS # AUTO: 388.2 10E9/L (ref 25–95)
RETICS/RBC NFR AUTO: 9.7 % (ref 0.5–2)
SODIUM SERPL-SCNC: 140 MMOL/L (ref 133–144)
WBC # BLD AUTO: 13.2 10E9/L (ref 4–11)

## 2019-10-01 PROCEDURE — 99221 1ST HOSP IP/OBS SF/LOW 40: CPT | Performed by: NURSE PRACTITIONER

## 2019-10-01 PROCEDURE — 25000125 ZZHC RX 250

## 2019-10-01 PROCEDURE — 85025 COMPLETE CBC W/AUTO DIFF WBC: CPT | Performed by: STUDENT IN AN ORGANIZED HEALTH CARE EDUCATION/TRAINING PROGRAM

## 2019-10-01 PROCEDURE — 25000132 ZZH RX MED GY IP 250 OP 250 PS 637: Performed by: STUDENT IN AN ORGANIZED HEALTH CARE EDUCATION/TRAINING PROGRAM

## 2019-10-01 PROCEDURE — 85045 AUTOMATED RETICULOCYTE COUNT: CPT | Performed by: STUDENT IN AN ORGANIZED HEALTH CARE EDUCATION/TRAINING PROGRAM

## 2019-10-01 PROCEDURE — 86901 BLOOD TYPING SEROLOGIC RH(D): CPT | Performed by: STUDENT IN AN ORGANIZED HEALTH CARE EDUCATION/TRAINING PROGRAM

## 2019-10-01 PROCEDURE — 25800025 ZZH RX 258: Performed by: STUDENT IN AN ORGANIZED HEALTH CARE EDUCATION/TRAINING PROGRAM

## 2019-10-01 PROCEDURE — 80053 COMPREHEN METABOLIC PANEL: CPT | Performed by: STUDENT IN AN ORGANIZED HEALTH CARE EDUCATION/TRAINING PROGRAM

## 2019-10-01 PROCEDURE — 86850 RBC ANTIBODY SCREEN: CPT | Performed by: STUDENT IN AN ORGANIZED HEALTH CARE EDUCATION/TRAINING PROGRAM

## 2019-10-01 PROCEDURE — 12000014 ZZH R&B PEDS UMMC

## 2019-10-01 PROCEDURE — 86900 BLOOD TYPING SEROLOGIC ABO: CPT | Performed by: STUDENT IN AN ORGANIZED HEALTH CARE EDUCATION/TRAINING PROGRAM

## 2019-10-01 PROCEDURE — 86902 BLOOD TYPE ANTIGEN DONOR EA: CPT | Performed by: STUDENT IN AN ORGANIZED HEALTH CARE EDUCATION/TRAINING PROGRAM

## 2019-10-01 PROCEDURE — 99207 ZZC CDG-CODE CATEGORY CHANGED: CPT | Performed by: NURSE PRACTITIONER

## 2019-10-01 PROCEDURE — 86923 COMPATIBILITY TEST ELECTRIC: CPT | Performed by: STUDENT IN AN ORGANIZED HEALTH CARE EDUCATION/TRAINING PROGRAM

## 2019-10-01 RX ORDER — LIDOCAINE 40 MG/G
CREAM TOPICAL
Status: COMPLETED
Start: 2019-10-01 | End: 2019-10-01

## 2019-10-01 RX ORDER — LIDOCAINE 40 MG/G
CREAM TOPICAL
Status: DISCONTINUED | OUTPATIENT
Start: 2019-10-01 | End: 2019-10-13 | Stop reason: HOSPADM

## 2019-10-01 RX ORDER — HEPARIN SODIUM,PORCINE 10 UNIT/ML
3-6 VIAL (ML) INTRAVENOUS
Status: DISCONTINUED | OUTPATIENT
Start: 2019-10-01 | End: 2019-10-13 | Stop reason: HOSPADM

## 2019-10-01 RX ORDER — SENNOSIDES 8.6 MG
1 TABLET ORAL 2 TIMES DAILY PRN
Status: DISCONTINUED | OUTPATIENT
Start: 2019-10-01 | End: 2019-10-05

## 2019-10-01 RX ORDER — ERYTHROMYCIN ETHYLSUCCINATE 400 MG/1
400 TABLET ORAL
Status: DISCONTINUED | OUTPATIENT
Start: 2019-10-01 | End: 2019-10-03

## 2019-10-01 RX ORDER — ALBUTEROL SULFATE 90 UG/1
2 AEROSOL, METERED RESPIRATORY (INHALATION) EVERY 4 HOURS PRN
Status: DISCONTINUED | OUTPATIENT
Start: 2019-10-01 | End: 2019-10-13 | Stop reason: HOSPADM

## 2019-10-01 RX ORDER — ERYTHROMYCIN ETHYLSUCCINATE 400 MG/1
400 TABLET ORAL
Status: DISCONTINUED | OUTPATIENT
Start: 2019-10-01 | End: 2019-10-01

## 2019-10-01 RX ORDER — HEPARIN SODIUM (PORCINE) LOCK FLUSH IV SOLN 100 UNIT/ML 100 UNIT/ML
5 SOLUTION INTRAVENOUS
Status: DISCONTINUED | OUTPATIENT
Start: 2019-10-01 | End: 2019-10-13 | Stop reason: HOSPADM

## 2019-10-01 RX ORDER — HYDROXYUREA 500 MG/1
2000 CAPSULE ORAL DAILY
Status: DISCONTINUED | OUTPATIENT
Start: 2019-10-02 | End: 2019-10-07

## 2019-10-01 RX ORDER — ASPIRIN 81 MG/1
81 TABLET ORAL DAILY
Status: DISCONTINUED | OUTPATIENT
Start: 2019-10-02 | End: 2019-10-13 | Stop reason: HOSPADM

## 2019-10-01 RX ORDER — GABAPENTIN 300 MG/1
600 CAPSULE ORAL 3 TIMES DAILY
Status: DISCONTINUED | OUTPATIENT
Start: 2019-10-01 | End: 2019-10-09

## 2019-10-01 RX ORDER — HYDROXYUREA 500 MG/1
2000 CAPSULE ORAL DAILY
Status: DISCONTINUED | OUTPATIENT
Start: 2019-10-02 | End: 2019-10-01

## 2019-10-01 RX ORDER — ERYTHROMYCIN ETHYLSUCCINATE 400 MG/5ML
400 SUSPENSION ORAL ONCE
Status: COMPLETED | OUTPATIENT
Start: 2019-10-01 | End: 2019-10-01

## 2019-10-01 RX ORDER — HEPARIN SODIUM,PORCINE 10 UNIT/ML
3-6 VIAL (ML) INTRAVENOUS EVERY 24 HOURS
Status: DISCONTINUED | OUTPATIENT
Start: 2019-10-01 | End: 2019-10-13 | Stop reason: HOSPADM

## 2019-10-01 RX ORDER — GABAPENTIN 300 MG/1
300 CAPSULE ORAL ONCE
Status: COMPLETED | OUTPATIENT
Start: 2019-10-01 | End: 2019-10-01

## 2019-10-01 RX ADMIN — ERYTHROMYCIN ETHYLSUCCINATE 400 MG: 400 SUSPENSION ORAL at 21:59

## 2019-10-01 RX ADMIN — LIDOCAINE: 40 CREAM TOPICAL at 15:11

## 2019-10-01 RX ADMIN — DEXTROSE AND SODIUM CHLORIDE: 5; 450 INJECTION, SOLUTION INTRAVENOUS at 16:47

## 2019-10-01 RX ADMIN — GABAPENTIN 600 MG: 300 CAPSULE ORAL at 18:19

## 2019-10-01 RX ADMIN — GABAPENTIN 300 MG: 300 CAPSULE ORAL at 18:19

## 2019-10-01 ASSESSMENT — ACTIVITIES OF DAILY LIVING (ADL)
RETIRED_EATING: 0-->INDEPENDENT
TRANSFERRING: 0-->INDEPENDENT
FALL_HISTORY_WITHIN_LAST_SIX_MONTHS: NO
AMBULATION: 0-->INDEPENDENT
SWALLOWING: 0-->SWALLOWS FOODS/LIQUIDS WITHOUT DIFFICULTY
COGNITION: 0 - NO COGNITION ISSUES REPORTED
TOILETING: 0-->INDEPENDENT
DRESS: 0-->INDEPENDENT
RETIRED_COMMUNICATION: 0-->UNDERSTANDS/COMMUNICATES WITHOUT DIFFICULTY
BATHING: 0-->INDEPENDENT

## 2019-10-01 NOTE — LETTER
10/1/2019      RE: Jennifer NEWMAN Billy  [address]  [facility] [address]       [facility] for jackelyn-operative management surrounding upcoming procedures. She is doing well today. No fevers or cough. Received 3 units of PRBC yesterday with pre-Hgb 8.7 reportedly and post nearly 10. Escorted Jennifer to admission desk for admit as planned.     EITAN Grey CNP

## 2019-10-01 NOTE — LETTER
10/1/2019      RE: Jennifer NEWMAN Billy  [address]  [facility] [address]       [facility] for jackelyn-operative management surrounding upcoming procedures. She is doing well today. No fevers or cough. Received 3 units of PRBC yesterday with pre-Hgb 8.7 reportedly and post nearly 10. Escorted Jennifer to admission desk for admit as planned.     EITAN Grey CNP

## 2019-10-01 NOTE — H&P
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 Guy (tonsillectomy and possible adenoidectomy) and Dr. Franco (right elbow flexor lengthening, flexor pronator slide of wrist and finger & thumb adductor lengthening). Admitted pre-operatively for IV fluids, laboratory monitoring, pain planning.    Hematology  # History of left MCA stroke  # Hypercoagulability  # History of TIA  - Continue home ASA 81 mg qDay, okay per ENT  - Goal hgb ~ 10, higher than this increases risk of stroke    # HbSS  - CBC, retic, and type and screen on admission  - Hgb goal ~ 10  - Blood consent signed, 2 units on hold for OR  - Continue home deferasirox 900 mg daily  - Continue home erythromycin 400 mg TID  - Continue home Hydroxyurea 2000 mg daily    Neuro  # Chronic pain  - PACCT team consulted for dilaudid PCA guidance, appreciate recs  - PT consulted for early ambulation    Resp  - Continue home albuterol 2 puffs q4h prn   - On symbicort BID at home, unsure of dose. Mother will text picture and we can order for this evening  - incentive spirometry    FEN/GI  - D5 1/2 NS at 100 mL/hr  - NPO at mindnight    Access: port  Disposition: Pending post-operative recovery    Patient seen and discussed with Dr. Belinda Pope   Pediatric PGY3    I saw and evaluated the patient and agree with the resident's assessment and plan. I have personally reviewed all vital signs and laboratory studies performed in the last 24 hours.  Belinda Conner MD, MPH    [facility]  [facility]  Division of Pediatric Hematology/Oncology       Primary Care Physician   KWASI CONNELLY    Chief Complaint   Surgery    History is obtained from the patient    History of Present Illness   Jennifer Cervantes is a 19 year old female with HbSS and history of right MCA stroke who presents for pre-operative management prior to underoing tonsillectomy, possible adenoidectomy, and tendon lengthening tomorrow morning.    Jennifer [facility] with Dr. Jamaal Collins as her primary hematologist. She is on a chronic transfusion protocol for secondary stroke prevention, last transfusion was yesterday and was uncomplicated. She has otherwise been feeling well and at her baseline. She is anxious about how surgery will go tomorrow.     She does see the pain clinic at [facility] including a health psychologist and has a pain plan. Recommended to have a PACCT consult on arrival to assist with post-op pain control.     Complicated PMH also includes anxiety, migraines, asthma, recurrent tonsillitis, ACS, chronic pain, concern for hip AVN, h.o pneumococcal septicemia, intermittent left arm twitching. See Media tab in epic for [facility].     Past Medical History    I have reviewed this patient's medical history and updated it with pertinent information if needed.   Past Medical History:   Diagnosis Date     Anemia      Anxiety      Bleeding disorder (H)      Blood clotting disorder (H)      Cerebral infarction (H)      Depressive disorder      Migraines      Uncomplicated asthma        Past Surgical History   I have reviewed this patient's surgical history and updated it with pertinent information if needed.  - multiple port revisions-- last November 2014  - Lap cholecystectopy -- September 2018  - breast reduction-- April 2019    Immunization History   Immunization Status:  up to date and documented    Prior to Admission Medications   Prior to Admission Medications    Prescriptions Last Dose Informant Patient Reported? Taking?   Budesonide-Formoterol Fumarate (SYMBICORT IN)   Yes No   Sig: Inhale  into the lungs.   EPINEPHrine (EPIPEN) 0.3 MG/0.3ML injection   Yes No   GNP SENNA-LAX 8.6 MG tablet   Yes No   Sig: Take 1 tablet by mouth 2 times daily as needed for constipation   JADENU 360 MG tablet   Yes No   Sig: Take 2 tablets (720 mg) by mouth daily With one 180mg tab for a total daily dose of 900mg   PROAIR  (90 Base) MCG/ACT inhaler   Yes No   Sig: Inhale 2 puffs into the lungs every 4 hours as needed for shortness of breath / dyspnea or wheezing   acetaminophen (TYLENOL) 325 MG tablet   Yes No   Sig: Take 2 tablets (650 mg) by mouth every 6 hours as needed for mild pain   aspirin (ASA) 81 MG tablet   Yes No   Sig: Take 1 tablet (81 mg) by mouth daily   deferasirox (JADENU) 180 MG tablet   Yes No   Sig: Take 1 tablet (180 mg) by mouth daily With two 360mg tabs for a total daily dose of 900mg   erythromycin ethylsuccinate (E.E.S.) 400 MG tablet   Yes No   Sig: Take 1 tablet (400 mg) by mouth 3 times daily Before meals   gabapentin (NEURONTIN) 300 MG capsule   Yes No   Sig: Take 2 capsules (600 mg) by mouth 3 times daily   hydroxyurea (HYDREA) 500 MG capsule CHEMO 10/1/2019 at Unknown time  Yes Yes   Sig: Take 4 capsules (2,000 mg) by mouth daily   levonorgestrel (LILETTA, 52 MG,) 19.5 MCG/DAY IUD   Yes No   omeprazole (PRILOSEC) 40 MG DR capsule   Yes No   Sig: Take 1 capsule (40 mg) by mouth daily   oxyCODONE IR (ROXICODONE) 10 MG tablet   Yes No   Sig: Take 1 tablet (10 mg) by mouth every 6 hours as needed for severe pain   sertraline (ZOLOFT) 50 MG tablet   Yes No      Facility-Administered Medications: None     Allergies   Allergies   Allergen Reactions     Fish-Derived Products Hives     Seafood Hives     Diagnostic X-Ray Materials      PN: sickle cell     Fish Allergy      Gadolinium Other (See Comments)     Tongue swelling       Social History   I have updated  and reviewed the following Social History Narrative:   Lives with mother and three siblings.     Family History   Mother with a history of migraines, asthma, chronic pain, and hypertension. Siblings with sickle cell disease.    Review of Systems   The 10 point Review of Systems is negative other than noted in the HPI or here.     Physical Exam   Temp: 98.3  F (36.8  C) Temp src: Oral BP: 116/82 Pulse: 76   Resp: 20 SpO2: 99 % O2 Device: None (Room air)    Vital Signs with Ranges  Temp:  [98.3  F (36.8  C)] 98.3  F (36.8  C)  Pulse:  [76] 76  Resp:  [20] 20  BP: (116)/(82) 116/82  SpO2:  [99 %] 99 %  0 lbs 0 oz    General: alert, interactive and appropriate for age throughout exam. Pleasant and cooperative.  HEENT: Skull is atraumatic and normocephalic. PERRLA, sclera are non icteric and not injected, EOM are intact.  Nares are patent without drainage.  Oropharynx is clear without exudate, erythema or lesions. Tonsils 3+ and symmetric. Normal external ears.   Lymph:  Neck is supple without lymphadenopathy.  There is no supraclavicular, axillary or inguinal lymphadenopathy palpated.  Cardiovascular:  HR is regular, S1, S2 no murmur.  Capillary refill is < 2 seconds.  There is no edema.  Respiratory: Respirations are easy.  Lungs are clear to auscultation through out.  No crackles or wheezes. No cough noted.   Gastrointestinal:  BS present in all quadrants.  Abdomen is soft and non-tender.  No hepatosplenomegaly or masses are palpated.  Skin: No rashes, bruises or other skin lesions are noted. Port site on left chest is C/D/I. Well-healed scar from prior port on right chest. Well healed abdominal incisions.  Neurological/MSK:  Right hemiparesis with elbow and wrist at 90 degrees. Gait not observed. A/O x 3.     Data   Labs pending

## 2019-10-01 NOTE — PROGRESS NOTES
[facility] for jackelyn-operative management surrounding upcoming procedures. She is doing well today. No fevers or cough. Received 3 units of PRBC yesterday with pre-Hgb 8.7 reportedly and post nearly 10. Escorted Jennifer to admission desk for admit as planned.

## 2019-10-02 ENCOUNTER — ANESTHESIA (OUTPATIENT)
Dept: SURGERY | Facility: CLINIC | Age: 19
DRG: 500 | End: 2019-10-02
Payer: COMMERCIAL

## 2019-10-02 LAB
ABO + RH BLD: NORMAL
ABO + RH BLD: NORMAL
BASOPHILS # BLD AUTO: 0 10E9/L (ref 0–0.2)
BASOPHILS NFR BLD AUTO: 0.2 %
BLD GP AB SCN SERPL QL: NORMAL
BLD PROD TYP BPU: NORMAL
BLOOD BANK CMNT PATIENT-IMP: NORMAL
DIFFERENTIAL METHOD BLD: ABNORMAL
EOSINOPHIL # BLD AUTO: 0 10E9/L (ref 0–0.7)
EOSINOPHIL NFR BLD AUTO: 0 %
ERYTHROCYTE [DISTWIDTH] IN BLOOD BY AUTOMATED COUNT: 19.9 % (ref 10–15)
HCG UR QL: NEGATIVE
HCT VFR BLD AUTO: 34.2 % (ref 35–47)
HGB BLD-MCNC: 11.2 G/DL (ref 11.7–15.7)
IMM GRANULOCYTES # BLD: 0.1 10E9/L (ref 0–0.4)
IMM GRANULOCYTES NFR BLD: 0.6 %
LYMPHOCYTES # BLD AUTO: 0.9 10E9/L (ref 0.8–5.3)
LYMPHOCYTES NFR BLD AUTO: 5 %
MCH RBC QN AUTO: 28.7 PG (ref 26.5–33)
MCHC RBC AUTO-ENTMCNC: 32.7 G/DL (ref 31.5–36.5)
MCV RBC AUTO: 88 FL (ref 78–100)
MONOCYTES # BLD AUTO: 0.3 10E9/L (ref 0–1.3)
MONOCYTES NFR BLD AUTO: 1.8 %
NEUTROPHILS # BLD AUTO: 16.7 10E9/L (ref 1.6–8.3)
NEUTROPHILS NFR BLD AUTO: 92.4 %
NRBC # BLD AUTO: 0.1 10*3/UL
NRBC BLD AUTO-RTO: 0 /100
NUM BPU REQUESTED: 2
PLATELET # BLD AUTO: 231 10E9/L (ref 150–450)
RBC # BLD AUTO: 3.9 10E12/L (ref 3.8–5.2)
SPECIMEN EXP DATE BLD: NORMAL
WBC # BLD AUTO: 18.1 10E9/L (ref 4–11)

## 2019-10-02 PROCEDURE — 27210794 ZZH OR GENERAL SUPPLY STERILE: Performed by: OTOLARYNGOLOGY

## 2019-10-02 PROCEDURE — 0CTPXZZ RESECTION OF TONSILS, EXTERNAL APPROACH: ICD-10-PCS | Performed by: OTOLARYNGOLOGY

## 2019-10-02 PROCEDURE — 25800025 ZZH RX 258: Performed by: STUDENT IN AN ORGANIZED HEALTH CARE EDUCATION/TRAINING PROGRAM

## 2019-10-02 PROCEDURE — 36592 COLLECT BLOOD FROM PICC: CPT | Performed by: STUDENT IN AN ORGANIZED HEALTH CARE EDUCATION/TRAINING PROGRAM

## 2019-10-02 PROCEDURE — 25000128 H RX IP 250 OP 636: Performed by: STUDENT IN AN ORGANIZED HEALTH CARE EDUCATION/TRAINING PROGRAM

## 2019-10-02 PROCEDURE — 25800030 ZZH RX IP 258 OP 636: Performed by: ANESTHESIOLOGY

## 2019-10-02 PROCEDURE — 36000066 ZZH SURGERY LEVEL 4 W FLUORO 1ST 30 MIN - UMMC: Performed by: OTOLARYNGOLOGY

## 2019-10-02 PROCEDURE — 88304 TISSUE EXAM BY PATHOLOGIST: CPT | Performed by: OTOLARYNGOLOGY

## 2019-10-02 PROCEDURE — 25000566 ZZH SEVOFLURANE, EA 15 MIN: Performed by: OTOLARYNGOLOGY

## 2019-10-02 PROCEDURE — 88304 TISSUE EXAM BY PATHOLOGIST: CPT | Mod: 26,59 | Performed by: OTOLARYNGOLOGY

## 2019-10-02 PROCEDURE — 71000014 ZZH RECOVERY PHASE 1 LEVEL 2 FIRST HR: Performed by: OTOLARYNGOLOGY

## 2019-10-02 PROCEDURE — 25000125 ZZHC RX 250: Performed by: NURSE ANESTHETIST, CERTIFIED REGISTERED

## 2019-10-02 PROCEDURE — 81025 URINE PREGNANCY TEST: CPT | Performed by: ANESTHESIOLOGY

## 2019-10-02 PROCEDURE — 25000125 ZZHC RX 250: Performed by: OTOLARYNGOLOGY

## 2019-10-02 PROCEDURE — 85025 COMPLETE CBC W/AUTO DIFF WBC: CPT | Performed by: STUDENT IN AN ORGANIZED HEALTH CARE EDUCATION/TRAINING PROGRAM

## 2019-10-02 PROCEDURE — 25000128 H RX IP 250 OP 636: Performed by: ANESTHESIOLOGY

## 2019-10-02 PROCEDURE — 40000170 ZZH STATISTIC PRE-PROCEDURE ASSESSMENT II: Performed by: OTOLARYNGOLOGY

## 2019-10-02 PROCEDURE — 0KN90ZZ RELEASE RIGHT LOWER ARM AND WRIST MUSCLE, OPEN APPROACH: ICD-10-PCS | Performed by: ORTHOPAEDIC SURGERY

## 2019-10-02 PROCEDURE — 37000009 ZZH ANESTHESIA TECHNICAL FEE, EACH ADDTL 15 MIN: Performed by: OTOLARYNGOLOGY

## 2019-10-02 PROCEDURE — 71000015 ZZH RECOVERY PHASE 1 LEVEL 2 EA ADDTL HR: Performed by: OTOLARYNGOLOGY

## 2019-10-02 PROCEDURE — 0LN30ZZ RELEASE RIGHT UPPER ARM TENDON, OPEN APPROACH: ICD-10-PCS | Performed by: ORTHOPAEDIC SURGERY

## 2019-10-02 PROCEDURE — 25000132 ZZH RX MED GY IP 250 OP 250 PS 637: Performed by: STUDENT IN AN ORGANIZED HEALTH CARE EDUCATION/TRAINING PROGRAM

## 2019-10-02 PROCEDURE — 25000128 H RX IP 250 OP 636: Performed by: NURSE ANESTHETIST, CERTIFIED REGISTERED

## 2019-10-02 PROCEDURE — 0KNC0ZZ RELEASE RIGHT HAND MUSCLE, OPEN APPROACH: ICD-10-PCS | Performed by: ORTHOPAEDIC SURGERY

## 2019-10-02 PROCEDURE — 12000014 ZZH R&B PEDS UMMC

## 2019-10-02 PROCEDURE — 25000128 H RX IP 250 OP 636: Performed by: OTOLARYNGOLOGY

## 2019-10-02 PROCEDURE — 37000008 ZZH ANESTHESIA TECHNICAL FEE, 1ST 30 MIN: Performed by: OTOLARYNGOLOGY

## 2019-10-02 PROCEDURE — 99233 SBSQ HOSP IP/OBS HIGH 50: CPT | Performed by: NURSE PRACTITIONER

## 2019-10-02 PROCEDURE — 0J8G0ZZ DIVISION OF RIGHT LOWER ARM SUBCUTANEOUS TISSUE AND FASCIA, OPEN APPROACH: ICD-10-PCS | Performed by: ORTHOPAEDIC SURGERY

## 2019-10-02 PROCEDURE — 0K890ZZ DIVISION OF RIGHT LOWER ARM AND WRIST MUSCLE, OPEN APPROACH: ICD-10-PCS | Performed by: ORTHOPAEDIC SURGERY

## 2019-10-02 PROCEDURE — 36000064 ZZH SURGERY LEVEL 4 EA 15 ADDTL MIN - UMMC: Performed by: OTOLARYNGOLOGY

## 2019-10-02 RX ORDER — DEXAMETHASONE SODIUM PHOSPHATE 4 MG/ML
INJECTION, SOLUTION INTRA-ARTICULAR; INTRALESIONAL; INTRAMUSCULAR; INTRAVENOUS; SOFT TISSUE PRN
Status: DISCONTINUED | OUTPATIENT
Start: 2019-10-02 | End: 2019-10-02

## 2019-10-02 RX ORDER — KETOROLAC TROMETHAMINE 30 MG/ML
30 INJECTION, SOLUTION INTRAMUSCULAR; INTRAVENOUS EVERY 6 HOURS
Status: DISCONTINUED | OUTPATIENT
Start: 2019-10-02 | End: 2019-10-04

## 2019-10-02 RX ORDER — LIDOCAINE HYDROCHLORIDE 20 MG/ML
INJECTION, SOLUTION INFILTRATION; PERINEURAL PRN
Status: DISCONTINUED | OUTPATIENT
Start: 2019-10-02 | End: 2019-10-02

## 2019-10-02 RX ORDER — ONDANSETRON 2 MG/ML
INJECTION INTRAMUSCULAR; INTRAVENOUS PRN
Status: DISCONTINUED | OUTPATIENT
Start: 2019-10-02 | End: 2019-10-02

## 2019-10-02 RX ORDER — DIAZEPAM 10 MG/2ML
2.5 INJECTION, SOLUTION INTRAMUSCULAR; INTRAVENOUS EVERY 6 HOURS PRN
Status: DISCONTINUED | OUTPATIENT
Start: 2019-10-02 | End: 2019-10-04

## 2019-10-02 RX ORDER — SODIUM CHLORIDE, SODIUM LACTATE, POTASSIUM CHLORIDE, CALCIUM CHLORIDE 600; 310; 30; 20 MG/100ML; MG/100ML; MG/100ML; MG/100ML
INJECTION, SOLUTION INTRAVENOUS CONTINUOUS
Status: DISCONTINUED | OUTPATIENT
Start: 2019-10-02 | End: 2019-10-02 | Stop reason: HOSPADM

## 2019-10-02 RX ORDER — PROPOFOL 10 MG/ML
INJECTION, EMULSION INTRAVENOUS PRN
Status: DISCONTINUED | OUTPATIENT
Start: 2019-10-02 | End: 2019-10-02

## 2019-10-02 RX ORDER — ONDANSETRON 4 MG/1
4 TABLET, ORALLY DISINTEGRATING ORAL EVERY 30 MIN PRN
Status: DISCONTINUED | OUTPATIENT
Start: 2019-10-02 | End: 2019-10-02 | Stop reason: HOSPADM

## 2019-10-02 RX ORDER — FENTANYL CITRATE 50 UG/ML
INJECTION, SOLUTION INTRAMUSCULAR; INTRAVENOUS PRN
Status: DISCONTINUED | OUTPATIENT
Start: 2019-10-02 | End: 2019-10-02

## 2019-10-02 RX ORDER — NALOXONE HYDROCHLORIDE 0.4 MG/ML
.1-.4 INJECTION, SOLUTION INTRAMUSCULAR; INTRAVENOUS; SUBCUTANEOUS
Status: DISCONTINUED | OUTPATIENT
Start: 2019-10-02 | End: 2019-10-12

## 2019-10-02 RX ORDER — NALOXONE HYDROCHLORIDE 0.4 MG/ML
.1-.4 INJECTION, SOLUTION INTRAMUSCULAR; INTRAVENOUS; SUBCUTANEOUS
Status: DISCONTINUED | OUTPATIENT
Start: 2019-10-02 | End: 2019-10-03

## 2019-10-02 RX ORDER — CEFAZOLIN SODIUM 1 G/3ML
1 INJECTION, POWDER, FOR SOLUTION INTRAMUSCULAR; INTRAVENOUS SEE ADMIN INSTRUCTIONS
Status: DISCONTINUED | OUTPATIENT
Start: 2019-10-02 | End: 2019-10-02 | Stop reason: HOSPADM

## 2019-10-02 RX ORDER — FENTANYL CITRATE 50 UG/ML
25-50 INJECTION, SOLUTION INTRAMUSCULAR; INTRAVENOUS
Status: DISCONTINUED | OUTPATIENT
Start: 2019-10-02 | End: 2019-10-02 | Stop reason: HOSPADM

## 2019-10-02 RX ORDER — BUPIVACAINE HYDROCHLORIDE AND EPINEPHRINE 5; 5 MG/ML; UG/ML
INJECTION, SOLUTION PERINEURAL PRN
Status: DISCONTINUED | OUTPATIENT
Start: 2019-10-02 | End: 2019-10-02 | Stop reason: HOSPADM

## 2019-10-02 RX ORDER — HYDROXYZINE HYDROCHLORIDE 25 MG/ML
25 INJECTION, SOLUTION INTRAMUSCULAR EVERY 6 HOURS PRN
Status: DISCONTINUED | OUTPATIENT
Start: 2019-10-02 | End: 2019-10-06

## 2019-10-02 RX ORDER — DEXAMETHASONE SODIUM PHOSPHATE 4 MG/ML
10 INJECTION, SOLUTION INTRA-ARTICULAR; INTRALESIONAL; INTRAMUSCULAR; INTRAVENOUS; SOFT TISSUE EVERY 8 HOURS
Status: COMPLETED | OUTPATIENT
Start: 2019-10-02 | End: 2019-10-03

## 2019-10-02 RX ORDER — ONDANSETRON 2 MG/ML
4 INJECTION INTRAMUSCULAR; INTRAVENOUS EVERY 30 MIN PRN
Status: DISCONTINUED | OUTPATIENT
Start: 2019-10-02 | End: 2019-10-02 | Stop reason: HOSPADM

## 2019-10-02 RX ORDER — DIAZEPAM 10 MG/2ML
2.5 INJECTION, SOLUTION INTRAMUSCULAR; INTRAVENOUS EVERY 6 HOURS
Status: DISCONTINUED | OUTPATIENT
Start: 2019-10-02 | End: 2019-10-02

## 2019-10-02 RX ORDER — KETOROLAC TROMETHAMINE 30 MG/ML
INJECTION, SOLUTION INTRAMUSCULAR; INTRAVENOUS PRN
Status: DISCONTINUED | OUTPATIENT
Start: 2019-10-02 | End: 2019-10-02

## 2019-10-02 RX ORDER — BUPIVACAINE HYDROCHLORIDE 2.5 MG/ML
INJECTION, SOLUTION EPIDURAL; INFILTRATION; INTRACAUDAL PRN
Status: DISCONTINUED | OUTPATIENT
Start: 2019-10-02 | End: 2019-10-02 | Stop reason: HOSPADM

## 2019-10-02 RX ORDER — DEXAMETHASONE SODIUM PHOSPHATE 4 MG/ML
10 INJECTION, SOLUTION INTRA-ARTICULAR; INTRALESIONAL; INTRAMUSCULAR; INTRAVENOUS; SOFT TISSUE ONCE
Status: DISCONTINUED | OUTPATIENT
Start: 2019-10-02 | End: 2019-10-02

## 2019-10-02 RX ORDER — CEFAZOLIN SODIUM 2 G/100ML
2 INJECTION, SOLUTION INTRAVENOUS
Status: COMPLETED | OUTPATIENT
Start: 2019-10-02 | End: 2019-10-02

## 2019-10-02 RX ORDER — HYDROMORPHONE HYDROCHLORIDE 1 MG/ML
.3-.5 INJECTION, SOLUTION INTRAMUSCULAR; INTRAVENOUS; SUBCUTANEOUS EVERY 5 MIN PRN
Status: DISCONTINUED | OUTPATIENT
Start: 2019-10-02 | End: 2019-10-02 | Stop reason: HOSPADM

## 2019-10-02 RX ORDER — LIDOCAINE HYDROCHLORIDE 20 MG/ML
5 SOLUTION OROPHARYNGEAL
Status: DISCONTINUED | OUTPATIENT
Start: 2019-10-02 | End: 2019-10-13 | Stop reason: HOSPADM

## 2019-10-02 RX ADMIN — Medication: at 12:57

## 2019-10-02 RX ADMIN — LIDOCAINE HYDROCHLORIDE 80 MG: 20 INJECTION, SOLUTION INFILTRATION; PERINEURAL at 08:40

## 2019-10-02 RX ADMIN — HYDROMORPHONE HYDROCHLORIDE 0.5 MG: 1 INJECTION, SOLUTION INTRAMUSCULAR; INTRAVENOUS; SUBCUTANEOUS at 11:21

## 2019-10-02 RX ADMIN — FENTANYL CITRATE 100 MCG: 50 INJECTION, SOLUTION INTRAMUSCULAR; INTRAVENOUS at 08:40

## 2019-10-02 RX ADMIN — PROPOFOL 200 MG: 10 INJECTION, EMULSION INTRAVENOUS at 08:40

## 2019-10-02 RX ADMIN — DEXTROSE AND SODIUM CHLORIDE: 5; 450 INJECTION, SOLUTION INTRAVENOUS at 15:26

## 2019-10-02 RX ADMIN — NALOXONE HYDROCHLORIDE 0.5 MCG/KG/HR: 0.4 INJECTION, SOLUTION INTRAMUSCULAR; INTRAVENOUS; SUBCUTANEOUS at 17:02

## 2019-10-02 RX ADMIN — CEFAZOLIN SODIUM 1 G: 2 INJECTION, SOLUTION INTRAVENOUS at 10:51

## 2019-10-02 RX ADMIN — SODIUM CHLORIDE, POTASSIUM CHLORIDE, SODIUM LACTATE AND CALCIUM CHLORIDE: 600; 310; 30; 20 INJECTION, SOLUTION INTRAVENOUS at 10:06

## 2019-10-02 RX ADMIN — CEFAZOLIN SODIUM 2 G: 2 INJECTION, SOLUTION INTRAVENOUS at 08:51

## 2019-10-02 RX ADMIN — HYDROMORPHONE HYDROCHLORIDE 0.5 MG: 1 INJECTION, SOLUTION INTRAMUSCULAR; INTRAVENOUS; SUBCUTANEOUS at 12:35

## 2019-10-02 RX ADMIN — DIAZEPAM 2.5 MG: 5 INJECTION, SOLUTION INTRAMUSCULAR; INTRAVENOUS at 21:52

## 2019-10-02 RX ADMIN — MIDAZOLAM 2 MG: 1 INJECTION INTRAMUSCULAR; INTRAVENOUS at 08:26

## 2019-10-02 RX ADMIN — ROCURONIUM BROMIDE 50 MG: 10 INJECTION INTRAVENOUS at 08:40

## 2019-10-02 RX ADMIN — ALBUTEROL SULFATE 4 PUFF: 90 AEROSOL, METERED RESPIRATORY (INHALATION) at 08:43

## 2019-10-02 RX ADMIN — ONDANSETRON 4 MG: 2 INJECTION INTRAMUSCULAR; INTRAVENOUS at 10:48

## 2019-10-02 RX ADMIN — HYDROMORPHONE HYDROCHLORIDE 0.5 MG: 1 INJECTION, SOLUTION INTRAMUSCULAR; INTRAVENOUS; SUBCUTANEOUS at 12:05

## 2019-10-02 RX ADMIN — HYDROMORPHONE HYDROCHLORIDE 0.5 MG: 1 INJECTION, SOLUTION INTRAMUSCULAR; INTRAVENOUS; SUBCUTANEOUS at 12:15

## 2019-10-02 RX ADMIN — FENTANYL CITRATE 50 MCG: 50 INJECTION INTRAMUSCULAR; INTRAVENOUS at 11:55

## 2019-10-02 RX ADMIN — SERTRALINE HYDROCHLORIDE 50 MG: 50 TABLET ORAL at 06:35

## 2019-10-02 RX ADMIN — DEXTROSE AND SODIUM CHLORIDE: 5; 450 INJECTION, SOLUTION INTRAVENOUS at 01:56

## 2019-10-02 RX ADMIN — FENTANYL CITRATE 100 MCG: 50 INJECTION, SOLUTION INTRAMUSCULAR; INTRAVENOUS at 08:58

## 2019-10-02 RX ADMIN — FENTANYL CITRATE 50 MCG: 50 INJECTION INTRAMUSCULAR; INTRAVENOUS at 11:45

## 2019-10-02 RX ADMIN — ASPIRIN 81 MG: 81 TABLET ORAL at 06:35

## 2019-10-02 RX ADMIN — DEXAMETHASONE SODIUM PHOSPHATE 10 MG: 4 INJECTION, SOLUTION INTRAMUSCULAR; INTRAVENOUS at 09:03

## 2019-10-02 RX ADMIN — ROCURONIUM BROMIDE 10 MG: 10 INJECTION INTRAVENOUS at 10:11

## 2019-10-02 RX ADMIN — HYDROMORPHONE HYDROCHLORIDE 0.5 MG: 1 INJECTION, SOLUTION INTRAMUSCULAR; INTRAVENOUS; SUBCUTANEOUS at 13:23

## 2019-10-02 RX ADMIN — KETOROLAC TROMETHAMINE 30 MG: 30 INJECTION, SOLUTION INTRAMUSCULAR at 18:51

## 2019-10-02 RX ADMIN — SODIUM CHLORIDE, POTASSIUM CHLORIDE, SODIUM LACTATE AND CALCIUM CHLORIDE: 600; 310; 30; 20 INJECTION, SOLUTION INTRAVENOUS at 08:10

## 2019-10-02 RX ADMIN — FENTANYL CITRATE 50 MCG: 50 INJECTION, SOLUTION INTRAMUSCULAR; INTRAVENOUS at 09:10

## 2019-10-02 RX ADMIN — SUGAMMADEX 200 MG: 100 INJECTION, SOLUTION INTRAVENOUS at 11:01

## 2019-10-02 RX ADMIN — DEXAMETHASONE SODIUM PHOSPHATE 10 MG: 4 INJECTION, SOLUTION INTRAMUSCULAR; INTRAVENOUS at 18:04

## 2019-10-02 RX ADMIN — KETOROLAC TROMETHAMINE 30 MG: 30 INJECTION, SOLUTION INTRAMUSCULAR at 10:50

## 2019-10-02 RX ADMIN — ALBUTEROL SULFATE 4 PUFF: 90 AEROSOL, METERED RESPIRATORY (INHALATION) at 11:11

## 2019-10-02 RX ADMIN — FENTANYL CITRATE 50 MCG: 50 INJECTION, SOLUTION INTRAMUSCULAR; INTRAVENOUS at 09:14

## 2019-10-02 RX ADMIN — FAMOTIDINE 20 MG: 20 INJECTION, SOLUTION INTRAVENOUS at 18:04

## 2019-10-02 NOTE — ANESTHESIA CARE TRANSFER NOTE
Patient: Jennifer Cervantes    Procedure(s):  Bilateral Tonsillectomy,  Right Elbow Flexor Lengthening, Flexor Pronator Slide Of Wrist and Finger, Thumb Adductor Lengthening    Diagnosis: Acute Recurrent Streptococcal Tonsillitis, Sickle Cell Disease, Cerebral Vascular Accident, Right Spastic Hemiplegia  Diagnosis Additional Information: No value filed.    Anesthesia Type:   No value filed.     Note:  Airway :Face Mask  Patient transferred to:PACU  Comments: Pt. Color pink, spontaneous respirations. Report to RN.Handoff Report: Identifed the Patient, Identified the Reponsible Provider, Reviewed the pertinent medical history, Discussed the surgical course, Reviewed Intra-OP anesthesia mangement and issues during anesthesia, Set expectations for post-procedure period and Allowed opportunity for questions and acknowledgement of understanding      Vitals: (Last set prior to Anesthesia Care Transfer)    CRNA VITALS  10/2/2019 1043 - 10/2/2019 1120      10/2/2019             Pulse:  108    Ht Rate:  105    SpO2:  100 %    Resp Rate (observed):  17                Electronically Signed By: EITAN Mahmood CRNA  October 2, 2019  11:20 AM

## 2019-10-02 NOTE — PLAN OF CARE
Afebrile, VSS. Patient back on floor from PACU around 1400. Patient very sedated at this time, started on 2L o2 via face mask for o2 sats in low 90's when sleeping. Patient began arousing around 1630. Patient rating pain 10/10. Currently on dilaudid PCA, has received 10 bumps. Patient started on narcan drip for itchiness from fentanyl given in PACU. Patient calm and cooperative. Talking well, no oral intake for me. Hourly rounding complete. Continue with POC, notify MD of any changes.

## 2019-10-02 NOTE — BRIEF OP NOTE
[facility] Brief Operative Note    Pre-operative diagnosis: Acute Recurrent Streptococcal Tonsillitis, Sickle Cell Disease, Cerebral Vascular Accident, Right Spastic Hemiplegia   Post-operative diagnosis Same   Procedure: Procedure(s):  Bilateral Tonsillectomy,  Right Elbow Flexor Lengthening, Flexor Pronator Slide Of Wrist and Finger, Thumb Adductor Lengthening   Surgeon(s): Surgeon(s) and Role:  Panel 1:     * Farhana Guy MD - Primary  Panel 2:     * Anai Franco MD - Primary   Estimated blood loss: * No values recorded between 10/2/2019 12:00 AM and 10/2/2019 11:09 AM *    Specimens: ID Type Source Tests Collected by Time Destination   A : Left Tonsil Tissue Tonsil, Left SURGICAL PATHOLOGY EXAM Farhana Guy MD 10/2/2019  9:01 AM    B : Right Tonsile Tissue Tonsil, Right SURGICAL PATHOLOGY EXAM Farhana Guy MD 10/2/2019  9:02 AM       Findings: Long arm cast applied.  Keep right arm elevated.    Follow up in hand clinic on 10/21.

## 2019-10-02 NOTE — PROGRESS NOTES
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 Guy (tonsillectomy) and Ortho -- Dr. Franco (right elbow flexor pronator plasty, right elbow brachialis lengthening, right elbow biceps tenotomy, right thumb thenar release). Now POD#0 with need for IV hydration, close monitoring of hemoglobin, and pain control.      Hematology  # History of left MCA stroke  # Hypercoagulability  # History of TIA  - Continue home ASA 81 mg qDay, okay per ENT once able to PO  - Goal hgb ~ 10, higher than this increases risk of stroke     # HbSS  - CBC at 2000   - Hgb goal ~ 10  - Continue home deferasirox 900 mg daily once able to PO  - Continue home erythromycin 400 mg TID once able to PO  - Continue home Hydroxyurea 2000 mg daily once able to PO     Neuro  # Chronic pain  # Acute post-operative pain  - PACCT team consulted for dilaudid PCA guidance, appreciate recs  - PT consulted for early ambulation  - Hydromorphone PCA with basal rate 0.3, bumps 0.3, 1 hour max of 1.5 mg. Increase per PACCT note.  - Scheduled tylenol q6h liquid PO  - Scheduled toradol q6h IV  - Lorazepam 2.5 mg q6h IV scheduled with q6h PRN for muscle spasms    ENT  # S/p bilateral tonsillectomy  - 10 mg decadron IV tonight  - 2% viscous lidocaine rinses q2h prn  - Ok to resume clears ~ 4 hours post-procedure, then soft diet x 2 weeks  - ENT must be paged immediately for any spitting up or coughing of blood    Resp  - Continue home albuterol 2 puffs q4h prn   - On symbicort BID at home, unsure of dose. Mother will text picture and we will then order  -  "incentive spirometry  - On 5 LPM by oxymask, wean as tolerated     FEN/GI  - D5 1/2 NS at 100 mL/hr  - Ok to resume clears ~ 4 hours post-procedure, then soft diet x 2 weeks     Access: port  Disposition: Pending post-operative recovery     Patient seen and discussed with Dr. Belinda Pope   Pediatric PGY3    I saw and evaluated the patient and agree with the resident's assessment and plan. I have personally reviewed all vital signs and laboratory studies performed in the last 24 hours.  Belinda Conner MD, MPH    [facility]  Division of Pediatric Hematology/Oncology       Interval History   OR went well. Minimal bleeding per ENT. Significant pain in PACU, PCA started. Had itching with fentanyl, improved with dilaudid but still happening particularly nose and scalp. On oxymask for desaturations.     A comprehensive review of systems was performed and was negative unless noted in the Interval History.    Physical Exam   Temp: 97.9  F (36.6  C) Temp src: Axillary BP: 121/74 Pulse: 79 Heart Rate: 85 Resp: 18 SpO2: 99 % O2 Device: None (Room air) Oxygen Delivery: 2 LPM  Vitals:    10/02/19 0620   Weight: 65.8 kg (145 lb 1 oz)     Vital Signs with Ranges  Temp:  [97.7  F (36.5  C)-98.8  F (37.1  C)] 97.9  F (36.6  C)  Pulse:  [72-81] 79  Heart Rate:  [75-92] 85  Resp:  [14-25] 18  BP: (111-130)/(58-82) 121/74  SpO2:  [98 %-100 %] 99 %  I/O last 3 completed shifts:  In: 1420 [I.V.:1420]  Out: 500 [Urine:500]    GENERAL: Eyes closed and groaning. Follows commands, answers questions about things to make her more comfortable (\"I want to watch TV,\" \"Which channel?\" \"ABC!\") but then intermittently falls asleep mid conversation and begins snoring. Itching at nose and scalp throughout exam.   SKIN: Clear. No significant rash, abnormal pigmentation or lesions  HEAD: Normocephalic  EYES: Eyes closed.  EARS: Normal external ears.   NOSE: Normal " without discharge.  MOUTH/THROAT: Swelling of bilateral cheeks, no bleeding  LUNGS: Clear. No rales, rhonchi, wheezing or retractions. Intermittent snoring when asleep.   HEART: Regular rhythm. Normal S1/S2. No murmurs. Normal pulses.  NEUROLOGIC: Cranial nerves grossly intact, squeezes with left hand with normal strength, normal speech  EXTREMITIES: right arm in long arm cast    Medications     dextrose 5% and 0.45% NaCl 100 mL/hr at 10/02/19 0156     HYDROmorphone       lactated ringers         [Auto Hold] aspirin  81 mg Oral Daily     ceFAZolin  1 g Intravenous See Admin Instructions     [Auto Hold] deferasirox  900 mg Oral Daily     [Auto Hold] erythromycin ethylsuccinate  400 mg Oral TID w/meals     [Auto Hold] gabapentin  600 mg Oral TID     [Auto Hold] heparin  5 mL Intracatheter Q28 Days     [Auto Hold] heparin lock flush  3-6 mL Intracatheter Q24H     [Auto Hold] hydroxyurea  2,000 mg Oral Daily     ketorolac  30 mg Intravenous Q6H     [Auto Hold] omeprazole  40 mg Oral Daily     [Auto Hold] sertraline  50 mg Oral Daily     [Auto Hold] sodium chloride (PF)  10 mL Intracatheter Q28 Days       Data   Results for orders placed or performed during the hospital encounter of 10/01/19 (from the past 24 hour(s))   PEDS PACCT (Pain and Advanced/Complex Care Team) IP Consult: Patient to be seen: Routine within 24 hrs; Call back #: [phone] ext [phone]; Pain management, post-operative planning; Consultant may enter orders: Yes; Requesting provider? Attending p..Echo Munoz APRN CNP     10/2/2019  8:47 AM      Pediatric Pain & Advanced/Complex Care Team (PACCT)  Initial Consultation, Pain Management    Jennifer Cervantes MRN#: [id number]   Age: 19 year old YOB: 2000   Date: 10/01/2019 Primary care provider: Jeimy Decker     Reason for consult: On the request of Dr. Conner from the   pediatric hematology/oncology service, we were consulted for   assessment and recommendations  "regarding post operative pain   management.  The following is a summary of my conversation with   Jennifer Cervantes, with recommendations based on this conversation   and information obtained from a review of relevant medical   records, including records from OSH.    ASSESSMENT, DIAGNOSIS & RECOMMENDATIONS  Assessment  Jennifer Cervantes is a 19 year old opioid-naive female with hgbSS   disease complicated by right MCA stoke with right arm   contractures secondary to spacticity, history of ACS and multiple   SCD crises per year, recurrent tonsillitis, anxiety, asthma, and   chronic low back pain who presents ahead of planned surgical   procedures: tonsillectomy and possible adenoidectomy, right right   elbow flexor lengthening, flexor pronator slide of wrist and   finger & thumb adductor lengthening with expected acute post   operative pain following her procedures.    Diagnosis  (1) anticipated acute post operative pain secondary to above   procedures  (2) history of multiple sickle cell crises and fear of stigma and   worry around being identified as \"drug-seeking\" in an unfamiliar   facility compounds anxiety, which may negatively impact pain    Recommendations  - recommendations for post-operative pain management are as below    PERIOPERATIVE PAIN MANAGEMENT RECOMMENDATIONS: (based on weight   65.8 kg and opioid use 0 mg/day)  The following recommendations are not intended to replace the   clinical judgment of the anesthesiologist in charge of the   patient while the patient is in the post-anesthesia care unit   (PACU) or while the patient is receiving epidural or intrathecal   opioid analgesics under the care of an anesthesiologist.   The purpose of this preoperative evaluation is to provide safe   guidelines for acute postoperative pain management, but does not   replace an adequate postoperative evaluation to confirm their   safe use. These recommendations are a guide to help acute   postoperative pain management once " the patient arrives to the   surgical unit (from the PACU), and are not intended to replace   the clinical judgment of the physician and surgical team in   charge of the patient.     NIGHT BEFORE SURGERY:  - Gabapentin 900 mg HS (in place of her usual 600 mg dose)    POSTOPERATIVE PAIN MANAGEMENT AND RECOMMENDATIONS:   Simple Analgesia:  - Acetaminophen 15 mg/kg IV Q6h scheduled. Change to oral route   as soon as able. Continue for the first 72 hours  - NSAID: No ketorolac per surgery. If ok with surgeons, recommend   celecoxib 200 mg po once daily (previously on 100 mg po twice   daily, changed to one dose due to T&A and anticipated difficulty   swallowing)    Opioid Analgesia:(IV hydromorphone PCA per recommendations from   her hematologist, appropriate given extremity surgery combined   with tonsillectomy; initial dosing based on review of prior   records for admission for sickle cell crises)  - Start hydromorphone IV PCA as follows:  PCA dose: 0.2 mg (200 mcg)  Lockout interval: 10 minutes  Continuous rate: 0.2 mg/hr (200 mcg/hr)  One hour dose limit: 1 mg (i.e. 3 boluses allowed in 1 hour)    - Titrate PCA by 25-50% for sedation/desired wean (see Opioid   Titration below).   - If INadequate pain control WITHOUT signs of over-sedation   (difficulty to arouse and keep awake, decreased respiratory rate,   dropping oxygen saturations in the setting of decreased   respiratory rate), please increase as follows:  STEP-UP ONE: hydromorphone, 0.3 PCA dose, 10 min lockout, 0.3   mg/hr con't rate, 1-hour maximum dose of 1.5 mg (4 boluses   allowed in 1 hour)  STEP-UP TWO: hydromorphone, 0.4 mg PCA dose, 10 min lockout, 0.4   mg/hr con't rate, 1-hour maximum dose of 2 mg (4 boluses allowed   in 1 hour)  STEP-UP THREE: Contact the PACCT provider on call for assistance  - If adequate pain control WITH MILD signs of over-sedation,   please decrease dose (and PRNs) to the previous step.  If still   at the starting rate/dose of  "0.2 mg/hr + 0.2 mcg, please decrease   as follows:  STEP-DOWN ONE: hydromorphone, 0.15 mg PCA dose, 10 min lockout,   0.15 mg/hr con't rate, 1-hour maximum dose of 0.75 mg (4 boluses   allowed in 1 hour)  STEP-DOWN TWO: Discontinue continuous infusion.  Maintain PCA   dose at 0.15 mg, with a 1-hour maximum dose of 0.6 mg (4 boluses   allowed in 1 hour) with a lockout interval of 10 min.  - If INadequate pain control WITH signs of oversedation, please   contact the PACCT provider on call for further instruction.    ADJUVANT ANALGESIA  - Continue home gabapentin, 600 mg TID as soon as she is able to   have oral medications. Capsules may be opened and sprinkled on   sherbet or other approved food  - Diazepam 2.5 mg IV Q6h PRN OR methocarbamol 750 mg IV QID PRN   for muscle spasms.   - Increase diazepam to 4 mg IV Q6h PRN/methocarbamol to 1000 mg   IV QID PRN for inadequate response to muscle spasms and no   sedation with doses.  - if inadequate response and patient is not too sedate, change to   alternate agent (I.e. if diazepam was used, change to   methocarbamol)  - Regional anesthesia per RAPS (if indicated). Anesthesia to   write regional anesthesia orders    ADVERSE REACTION MANAGEMENT  Constipation: PLEASE SCHEDULE as soon as able to take oral   medications:  \"Mush\": Start polyethylene glycol 3350, 17g daily  \"Push\": Start senna, 5 mls or 1 tab HS  \"Uncorking\": Consider dulcolax suppository or Fleet enema if no   stool in 24 hours.  Pruritus: For opioid-induced pruritus, start a naloxone   continuous infusion at 0.5 mcg/kg/hr.  Can titrate upwards   (usually done in 0.5 mcg/kg/hr increments) till a maximum dose of   2 mcg/kg/hr is reached.  If pruritus is still a distressing   symptom at maximum doses of naloxone, contact the PACCT provider   on call for assistance with an opioid rotation.  Note that   opioid-induced pruritus is NOT a histamine-mediated reaction,   therefore antihistamines (such as " diphenhydramine/Benadryl ) are   generally ineffective in resolving this symptom.  Nausea/Vomiting: recommend PRN ondansetron as first line   treatment    Non-medication strategies:   - Child Life Specialist, mental health provider, and/or   caregiver support, when appropriate and available   - Allow choices where permitted, positioning, rapport builiding   - Cognitive: auditory stimuli (e.g. tablets), control,   controlled breathing, distraction, imagery, hypnosis (by trained   provider only), modeling, relaxation, prepare for coping   techniques and/or teaching procedures, relaxation   - Biophysical: environmental modification, holding, touching,   massage, heat or cold application   - Distraction: music, TV, video games, guided imagery, deep   breathing, conversation  Other considerations: Older patients may or may not want   caregiver presence. Establish rapport to increase cooperation.   Allow to watch procedure, if requested    The above recommendations are based on the WHO Guidelines for the   Pharmacological Treatment of Persisting Pain in Children with   Medical Illnesses: (1) using a two-step strategy, (2) dosing at   regular intervals, (3) using the appropriate route of   administration, and (4) adapting treatment to the individual   child (available at:   http://apps.who.int/iris/Community [employer]/96461/17442/[email or web address]_Gui  delines.pdf).    Thank you for the opportunity to participate in the care of this   patient and family.  Please contact the Pain and Advanced/Complex   Care Team (PACCT) with any emergent needs via text page to the   PACCT general pager ([phone], answered 8-4:30 Monday to   Friday).  After hours and on weekends/holidays, please refer to   the [facility] or [place] on-call schedule.    The above assessment and plan was discussed with the care team.    A total of 75 minutes were spent face-to-face or in the   coordination care of Jennifer Cervantes.  Greater than 50% of my time   on the unit  "was spent counseling the patient and/or coordinating   care.    Echo Iniguez NP   Pain and Advanced/Complex Care Team (PACCT)  [facility]  Pager: [phone]  (late entry 10/2, consult performed 10/1)    SUBJECTIVE: History of the Present Illness  From admission H&P:   \"Jennifer Cervantes is a 19 year old female with HbSS and history of   right MCA stroke who presents for pre-operative management prior   to underoing tonsillectomy, possible adenoidectomy, and tendon   lengthening tomorrow morning.     Jennifer is [facility] treated at [facility] with Dr. Jamaal Collins as her primary hematologist. She is on   a chronic transfusion protocol for secondary stroke prevention,   last transfusion was yesterday and was uncomplicated. She has   otherwise been feeling well and at her baseline. She is anxious   about how surgery will go tomorrow.      She does see the pain clinic at [facility] including a health   psychologist and has a pain plan. Recommended to have a PACCT   consult on arrival to assist with post-op pain control.      Complicated PMH also includes anxiety, migraines, asthma,   recurrent tonsillitis, ACS, chronic pain, concern for hip AVN,   h.o pneumococcal septicemia, intermittent left arm twitching. See   Media tab in epic for [facility].\"    SUBJECTIVE: Treatments Rendered/Attempted  Pharmacological Interventions (effectiveness and/or significant   side effects in parentheses):   - simple analgesia:  acetaminophen: helpful  ibuprofen: helpful  celecoxib: helpful  ketorolac: helpful   - weak opioids:  tramadol: unsure if helpful   - strong opioids:  hydromorphone: helpful  oxycodone: helpful   - anti-epileptics:  gabapentin: unsure if helpful; currently taking but wishes to   taper  pregabalin: not helpful   - TCAs:  None   - benzodiazepines:  lorazepam: helpful  - SSRIs/SNRIs:  sertraline: helpful  - á-agonists:  None   - " NMDA-receptor antagonists:  None   - anti-histamines:  hydroxyzine: unsure if helpful    Other than above she has not tried other NSAIDs or celecoxib,   weak opioids, strong opioids, anti-epileptics/gabapentinoids,   tri-cyclic antidepressants (such as amitriptyline),   benzodiazepines, SSRIs or SNRIs, alpha-agonists (such as   clonidine), NMDA-receptor antagonists (i.e. ketamine),   anti-histamines, anti-cholinergics (such as hyoscyamine or   dicyclomine), muscle relaxants or analgesic patches/creams/gels.    Non-pharmacological Intervention History   - Integrative medicine: deep breathing, imagery, distraction   - Psychiatry/Therapy/Counseling: follows with Dr. Garay at   [facility]   - Physical/Occupational/Speech therapy: yes    SUBJECTIVE: Past/Family/Social History  Past Medical History  Past Medical History:   Diagnosis Date     Anemia      Anxiety      Bleeding disorder (H)      Blood clotting disorder (H)      Cerebral infarction (H)      Depressive disorder      Migraines      Uncomplicated asthma        Past Surgical History  No past surgical history on file.   Breast reduction surgery in May, recovering well    Family History  No family history on file.    Social History    OBJECTIVE ASSESSMENTS: Last 24 hours  VITALS: Reviewed; all vital signs were within normal limits for   age  INS/OUTS:   Taking PO? yes  Bowel movements? yes  PAIN (NR scale): denies    Current Medications  I have reviewed this patient's medication profile and medications   during this hospitalization.    Current Facility-Administered Medications   Medication     [Auto Hold] albuterol (PROAIR HFA/PROVENTIL HFA/VENTOLIN HFA)   108 (90 Base) MCG/ACT inhaler 2 puff     [Auto Hold] aspirin EC tablet 81 mg     ceFAZolin (ANCEF) 1 g vial to attach to  ml bag for ADULT   or 50 ml bag for PEDS     ceFAZolin (ANCEF) intermittent infusion 2 g in 100 mL dextrose   PRE-MIX     [Auto Hold] deferasirox (JADENU) tablet  900 mg - PATIENT   SUPPLIED MEDICATION     dextrose 5% and 0.45% NaCl infusion     [Auto Hold] EPINEPHrine (ADRENALIN) kit 0.3 mg     [Auto Hold] erythromycin ethylsuccinate (E.E.S.) tablet 400 mg     [Auto Hold] gabapentin (NEURONTIN) capsule 600 mg     [Auto Hold] heparin 100 UNIT/ML injection 5 mL     [Auto Hold] heparin lock flush 10 UNIT/ML injection 3-6 mL     [Auto Hold] heparin lock flush 10 UNIT/ML injection 3-6 mL     [Auto Hold] hydroxyurea (HYDREA) capsule CHEMO 2,000 mg     lactated ringers infusion     [Auto Hold] lidocaine (LMX4) cream     [Auto Hold] lidocaine 1 % 0.1-1 mL     [Auto Hold] omeprazole (priLOSEC) CR capsule 40 mg     [Auto Hold] sennosides (SENOKOT) tablet 1 tablet     [Auto Hold] sertraline (ZOLOFT) tablet 50 mg     [Auto Hold] sodium chloride (PF) 0.9% PF flush 0.2-10 mL     [Auto Hold] sodium chloride (PF) 0.9% PF flush 10 mL     Review of Systems  A comprehensive review of systems was performed, and was negative   other than what was described above.    Physical Examination  General: Alert, awake, NAD,   HEENT: NC/AT, No masses, lesions, tenderness or abnormalities,   PERRL, EOMI. Sclera non-icteric, non-injected.  Conjunctivae pink   without discharge. External ears normal.  Nares without   discharge, MMM. Neck spple, with full ROM.  No cervical LAD.  Cardiovascular: RRR, Physiologic S1/S2, No m/g/r,  Respiratory: CTAB, No increased WOB, No inter- or sub-costal   retractions.  Gastrointestinal: Abdomen soft, non-tender, non-distended.    Normoactive BS. No organomegaly or masses felt.  Genitourinary: Deferred.  Extremities: Right hemiparesis, right wrist contracture.    Capillary refill <2 seconds.  No peripheral edema.   Skin: No suspicious bruises, lesions or rashes.   Psych/Neuro: Alert & oriented to person, place, time and   situation.  Cranial nerves II-XI grossly intact. Mentation and   affect normal. Purposeful speech    Laboratory/Imaging/Pathology  Results for orders  placed or performed during the hospital   encounter of 10/01/19 (from the past 24 hour(s))   Comprehensive metabolic panel   Result Value Ref Range    Sodium 140 133 - 144 mmol/L    Potassium 3.7 3.4 - 5.3 mmol/L    Chloride 111 (H) 94 - 109 mmol/L    Carbon Dioxide 24 20 - 32 mmol/L    Anion Gap 5 3 - 14 mmol/L    Glucose 99 70 - 99 mg/dL    Urea Nitrogen 9 7 - 30 mg/dL    Creatinine 0.61 0.52 - 1.04 mg/dL    GFR Estimate >90 >60 mL/min/[1.73_m2]    GFR Estimate If Black >90 >60 mL/min/[1.73_m2]    Calcium 7.7 (L) 8.5 - 10.1 mg/dL    Bilirubin Total 1.7 (H) 0.2 - 1.3 mg/dL    Albumin 3.7 3.4 - 5.0 g/dL    Protein Total 7.5 6.8 - 8.8 g/dL    Alkaline Phosphatase 66 40 - 150 U/L    ALT 47 0 - 50 U/L    AST 47 (H) 0 - 45 U/L   CBC with platelets differential   Result Value Ref Range    WBC 13.2 (H) 4.0 - 11.0 10e9/L    RBC Count 4.01 3.8 - 5.2 10e12/L    Hemoglobin 11.8 11.7 - 15.7 g/dL    Hematocrit 34.8 (L) 35.0 - 47.0 %    MCV 87 78 - 100 fl    MCH 29.4 26.5 - 33.0 pg    MCHC 33.9 31.5 - 36.5 g/dL    RDW 19.8 (H) 10.0 - 15.0 %    Platelet Count 205 150 - 450 10e9/L    Diff Method Automated Method     % Neutrophils 68.3 %    % Lymphocytes 22.1 %    % Monocytes 6.2 %    % Eosinophils 2.1 %    % Basophils 1.0 %    % Immature Granulocytes 0.3 %    Nucleated RBCs 1 (H) 0 /100    Absolute Neutrophil 9.0 (H) 1.6 - 8.3 10e9/L    Absolute Lymphocytes 2.9 0.8 - 5.3 10e9/L    Absolute Monocytes 0.8 0.0 - 1.3 10e9/L    Absolute Eosinophils 0.3 0.0 - 0.7 10e9/L    Absolute Basophils 0.1 0.0 - 0.2 10e9/L    Abs Immature Granulocytes 0.0 0 - 0.4 10e9/L    Absolute Nucleated RBC 0.1    Reticulocyte count   Result Value Ref Range    % Retic 9.7 (H) 0.5 - 2.0 %    Absolute Retic 388.2 (H) 25 - 95 10e9/L   ABO/Rh type and screen   Result Value Ref Range    ABO O     RH(D) Pos     Antibody Screen Neg     Test Valid Only At          [facility]    Specimen Expires 10/04/2019    HCG  qualitative urine   Result Value Ref Range    HCG Qual Urine Negative NEG^Negative        Comprehensive metabolic panel   Result Value Ref Range    Sodium 140 133 - 144 mmol/L    Potassium 3.7 3.4 - 5.3 mmol/L    Chloride 111 (H) 94 - 109 mmol/L    Carbon Dioxide 24 20 - 32 mmol/L    Anion Gap 5 3 - 14 mmol/L    Glucose 99 70 - 99 mg/dL    Urea Nitrogen 9 7 - 30 mg/dL    Creatinine 0.61 0.52 - 1.04 mg/dL    GFR Estimate >90 >60 mL/min/[1.73_m2]    GFR Estimate If Black >90 >60 mL/min/[1.73_m2]    Calcium 7.7 (L) 8.5 - 10.1 mg/dL    Bilirubin Total 1.7 (H) 0.2 - 1.3 mg/dL    Albumin 3.7 3.4 - 5.0 g/dL    Protein Total 7.5 6.8 - 8.8 g/dL    Alkaline Phosphatase 66 40 - 150 U/L    ALT 47 0 - 50 U/L    AST 47 (H) 0 - 45 U/L   CBC with platelets differential   Result Value Ref Range    WBC 13.2 (H) 4.0 - 11.0 10e9/L    RBC Count 4.01 3.8 - 5.2 10e12/L    Hemoglobin 11.8 11.7 - 15.7 g/dL    Hematocrit 34.8 (L) 35.0 - 47.0 %    MCV 87 78 - 100 fl    MCH 29.4 26.5 - 33.0 pg    MCHC 33.9 31.5 - 36.5 g/dL    RDW 19.8 (H) 10.0 - 15.0 %    Platelet Count 205 150 - 450 10e9/L    Diff Method Automated Method     % Neutrophils 68.3 %    % Lymphocytes 22.1 %    % Monocytes 6.2 %    % Eosinophils 2.1 %    % Basophils 1.0 %    % Immature Granulocytes 0.3 %    Nucleated RBCs 1 (H) 0 /100    Absolute Neutrophil 9.0 (H) 1.6 - 8.3 10e9/L    Absolute Lymphocytes 2.9 0.8 - 5.3 10e9/L    Absolute Monocytes 0.8 0.0 - 1.3 10e9/L    Absolute Eosinophils 0.3 0.0 - 0.7 10e9/L    Absolute Basophils 0.1 0.0 - 0.2 10e9/L    Abs Immature Granulocytes 0.0 0 - 0.4 10e9/L    Absolute Nucleated RBC 0.1    Reticulocyte count   Result Value Ref Range    % Retic 9.7 (H) 0.5 - 2.0 %    Absolute Retic 388.2 (H) 25 - 95 10e9/L   ABO/Rh type and screen   Result Value Ref Range    Units Ordered 2     ABO O     RH(D) Pos     Antibody Screen Neg     Test Valid Only At          [facility]    Specimen Expires 10/04/2019      Crossmatch Red Blood Cells    Blood component   Result Value Ref Range    Unit Number [id number]     Blood Component Type Red Blood Cells Leukocyte Reduced     Division Number 00     Status of Unit Ready for patient 10/02/2019 0909     Blood Product Code [id number]     Unit Status EVANGELINA    Blood component   Result Value Ref Range    Unit Number [id number]     Blood Component Type Red Blood Cells Leukocyte Reduced     Division Number 00     Status of Unit Ready for patient 10/02/2019 0909     Blood Product Code [id number]     Unit Status EVANGELINA    HCG qualitative urine   Result Value Ref Range    HCG Qual Urine Negative NEG^Negative

## 2019-10-02 NOTE — PLAN OF CARE
Afebrile and vital signs stable. NPO at midnight in prep for OR at 0830. Denies pain or discomfort. Void x1; IV fluids infusing via port. Scrub x2 completed. No family present at bedside. Will continue to monitor and notify team with changes in the plan of care.

## 2019-10-02 NOTE — OP NOTE
Procedure Date: 10/02/2019      PREOPERATIVE DIAGNOSES:   1.  Sickle cell disease.   2.  Cerebrovascular accident.   3.  Right spastic hemiplegia.      POSTOPERATIVE DIAGNOSES:     1.  Sickle cell disease.   2.  Cerebrovascular accident.   3.  Right spastic hemiplegia.      PROCEDURES:   1.  Right elbow biceps tenotomy.   2.  Right elbow brachialis lengthening.   3.  Right flexor pronator plasty (wrist and finger lengthening).   4.  Left thumb thenar release (adductor).      SURGEON:  Anai Franco MD.        FIRST ASSISTANT:  None.      TOURNIQUET TIME:  45 minutes.       PREOPERATIVE MEDICATIONS:  Ancef 1 gram IV.      INDICATIONS:  This 19-year-old female has sickle cell disease and was admitted to the Medicine Service for a preoperative transfusion.  Risks, benefits and alternatives of the above-noted procedure were discussed with the patient and her mom.  Questions were answered and consent obtained.  Surgical expectations with maintenance of function and decreased contracture, but not complete release, was discussed, questions answered and consent obtained.  Site and side were signed and verified.      DESCRIPTION OF PROCEDURE:  The patient was brought to the operating room suite where general anesthesia was administered.  Dr. Guy proceeded with a tonsillectomy.  Upon completion of her procedure, the bed was turned in the right arm was sterilely prepped and draped in the usual manner.  After timeout verifying site and side, the limb was elevated, exsanguinated and the tourniquet inflated to 250 mmHg using a sterile tourniquet.  A transverse antecubital incision was then made with dissection carried down through subcutaneous tissue onto the forearm fascia, which was split.  The brachial artery and median nerve were identified and protected.  The lateral antebrachial cutaneous nerve was identified and protected.  A tenotomy was performed of her biceps.  Prior to the release, her elbow had a 95 degree elbow  flexion contracture.  Upon release, she gained an additional approximately 30 degrees.  Dissection was then carried down deep with protection of the brachioradialis laterally and the neurovascular bundle medially.  The fascial portion of the brachialis was then lengthened and this allowed an additional 30 degrees of extension.  This was deemed satisfactory.      Attention was then turned to the medial elbow.  A longitudinal incision was then made with dissection carried down through subcutaneous tissue onto the forearm fascia.  The ulnar nerve was identified at the medial intermuscular septum.  Thomas fascia was then released.  It was then traced down between the 2 heads of the flexor carpi ulnaris and the ulnar nerve was transposed.  The flexor pronator wad was then identified anteriorly along the border of the pronator teres and the median nerve was identified and protected.  A retractor and Penrose was then placed under the flexor pronator wad with identification and protection of the medial collateral ligament of the elbow which was preserved.  The flexor pronator wad was then elevated using cautery due to her sickle cell disease.  It was elevated off the medial epicondyle until there was release of the pronator teres, flexor digitorum superficialis, flexor carpi radialis, flexor carpi ulnaris, and palmaris.  The muscles were then elevated distally.  The remaining portion of the ulnar head of the flexor carpi ulnaris was released.      Prior to the release, the wrist was at 90 degrees of fixed flexion.  With this release it could be brought up to approximately 45 with curling of the fingers.  A separate tenotomy was made in the wrist of the proximal palmaris longus and flexor carpi ulnaris due to their continued contracture.      Attention was then turned to the thumb for a thenar release.  An incision was made along the thenar crease with dissection carried down through subcutaneous tissue with identification  and protection of the recurrent motor branch of the median nerve, as well as terminal branches of the ulnar nerve.  The adductor muscle was identified off the radial side of the third metacarpal and using bipolar cautery under direct visualization, the transverse head was released, which allowed greater abduction of the thumb ray.      Tourniquet was deflated and hemostasis was obtained.  Because of her sickle cell disease, a Gelfoam was placed in the elbow wound, but there was no significant bleeding.      Upon completion of the procedure, forearm fasciotomies were also performed through the forearm wound.  The elbow could then be brought to approximately 30 degrees of elbow extension.  The wrist 30 degrees of wrist flexion and the fingers would tend to curl but could be passively stretched with concomitant wrist flexion.  The hand wounds were closed using 4-0 plain gut suture.  The forearm wounds were closed using 2-0 Vicryl and 3-0 Monocryl.  A long arm cast was placed using Venancio Chou dressing and ABD pads with maximum elbow extension, wrist extension, and thumb abduction.         ISIAH TOUSSAINT MD             D: 10/02/2019   T: 10/02/2019   MT: SHANICE      Name:     HIMANSHU AL   MRN:      [id number]        Account:        [id number]   :      2000           Procedure Date: 10/02/2019      Document: [id number]

## 2019-10-02 NOTE — ANESTHESIA POSTPROCEDURE EVALUATION
Anesthesia POST Procedure Evaluation    Patient: Jennifer Cervantes   MRN:     [id number] Gender:   female   Age:    19 year old :      2000        Preoperative Diagnosis: Acute Recurrent Streptococcal Tonsillitis, Sickle Cell Disease, Cerebral Vascular Accident, Right Spastic Hemiplegia   Procedure(s):  Bilateral Tonsillectomy,  Right Elbow Flexor Lengthening, Flexor Pronator Slide Of Wrist and Finger, Thumb Adductor Lengthening   Postop Comments: No value filed.       Anesthesia Type:  No value filed.  No value filed.    Reportable Event: NO     PAIN: Uncomplicated   Sign Out status: Comfortable, Well controlled pain     PONV: No PONV   Sign Out status:  No Nausea or Vomiting     Neuro/Psych: Uneventful perioperative course   Sign Out Status: Preoperative baseline; Age appropriate mentation     Airway/Resp.: Uneventful perioperative course   Sign Out Status: Non labored breathing, age appropriate RR; Resp. Status within EXPECTED Parameters     CV: Uneventful perioperative course   Sign Out status: Appropriate BP and perfusion indices; Appropriate HR/Rhythm     Disposition:   Sign Out in:  PACU  Disposition:  Floor  Recovery Course: Uneventful  Follow-Up: Not required           Last Anesthesia Record Vitals:  CRNA VITALS  10/2/2019 1043 - 10/2/2019 1143      10/2/2019             Pulse:  108    Ht Rate:  105    SpO2:  100 %    Resp Rate (observed):  17          Last PACU Vitals:  Vitals Value Taken Time   /66 10/2/2019  3:37 PM   Temp 35.8  C (96.5  F) 10/2/2019  3:37 PM   Pulse 68 10/2/2019  3:37 PM   Resp 16 10/2/2019  3:37 PM   SpO2 94 % 10/2/2019  3:47 PM   Temp src     NIBP     Pulse     SpO2     Resp     Temp     Ht Rate     Temp 2     Vitals shown include unvalidated device data.      Electronically Signed By: Sergey Hollingsworth MD, 2019, 3:48 PM

## 2019-10-02 NOTE — ANESTHESIA PREPROCEDURE EVALUATION
Anesthesia Pre-Procedure Evaluation    Patient: Jennifer Cervantes   MRN:     [id number] Gender:   female   Age:    19 year old :      2000        Preoperative Diagnosis: Acute Recurrent Streptococcal Tonsillitis, Sickle Cell Disease, Cerebral Vascular Accident, Right Spastic Hemiplegia   Procedure(s):  Bilateral Tonsillectomy,  Right Elbow Flexor Lengthening, Flexor Pronator Slide Of Wrist and Finger, Thumb Adductor Lengthening     Past Medical History:   Diagnosis Date     Anemia      Anxiety      Bleeding disorder (H)      Blood clotting disorder (H)      Cerebral infarction (H)      Depressive disorder      Migraines      Uncomplicated asthma       No past surgical history on file.       Anesthesia Evaluation     .             ROS/MED HX    ENT/Pulmonary:     (+)Moderate Persistent asthma , . .    Neurologic:     (+)CVA with deficits- occurred at 22months of age. RUE spasticity, RLE spasms,     Cardiovascular:         METS/Exercise Tolerance:     Hematologic:     (+) Other Hematologic Disorder-sickle cell      Musculoskeletal:         GI/Hepatic:         Renal/Genitourinary:         Endo:         Psychiatric:         Infectious Disease:         Malignancy:         Other:                         PHYSICAL EXAM:   Mental Status/Neuro: A/A/O   Airway: Facies: Feasible  Mallampati: I  Mouth/Opening: Full   Respiratory: Auscultation: Wheezing      CV: Rhythm: Regular   Comments:                      LABS:  CBC:   Lab Results   Component Value Date    WBC 13.2 (H) 10/01/2019    HGB 11.8 10/01/2019    HCT 34.8 (L) 10/01/2019     10/01/2019     BMP:   Lab Results   Component Value Date     10/01/2019    POTASSIUM 3.7 10/01/2019    CHLORIDE 111 (H) 10/01/2019    CO2 24 10/01/2019    BUN 9 10/01/2019    CR 0.61 10/01/2019    GLC 99 10/01/2019     COAGS: No results found for: PTT, INR, FIBR  POC:   Lab Results   Component Value Date    HCG Negative 10/02/2019     OTHER:   Lab Results   Component Value Date     "MICAH 7.7 (L) 10/01/2019    ALBUMIN 3.7 10/01/2019    PROTTOTAL 7.5 10/01/2019    ALT 47 10/01/2019    AST 47 (H) 10/01/2019    ALKPHOS 66 10/01/2019    BILITOTAL 1.7 (H) 10/01/2019        Preop Vitals    BP Readings from Last 3 Encounters:   10/02/19 103/66   09/25/19 112/73   08/29/19 126/63    Pulse Readings from Last 3 Encounters:   10/02/19 68   09/25/19 74   08/29/19 95      Resp Readings from Last 3 Encounters:   10/02/19 16   09/25/19 24   08/29/19 17    SpO2 Readings from Last 3 Encounters:   10/02/19 96%   09/25/19 98%   08/29/19 98%      Temp Readings from Last 1 Encounters:   10/02/19 35.8  C (96.5  F) (Axillary)    Ht Readings from Last 1 Encounters:   09/25/19 1.639 m (5' 4.53\")      Wt Readings from Last 1 Encounters:   10/02/19 65.8 kg (145 lb 1 oz)    Estimated body mass index is 24.49 kg/m  as calculated from the following:    Height as of 9/25/19: 1.639 m (5' 4.53\").    Weight as of this encounter: 65.8 kg (145 lb 1 oz).     LDA:  Port A Cath Single 10/01/19 Left Chest wall (Active)   Access Date 10/01/19 10/1/2019  3:45 PM   Access Attempts 1 10/1/2019  3:45 PM   Gauge/Length 20 gauge;3/4 inch 10/1/2019  3:45 PM   Site Assessment WDL 10/2/2019  1:30 PM   Line Status Infusing 10/2/2019  1:30 PM   Extravasation? No 10/2/2019  4:00 AM   Dressing Intervention Transparent 10/2/2019  4:00 AM   Dressing change due 10/08/19 10/2/2019  4:00 AM   Needle Change Due 10/08/19 10/2/2019  4:00 AM   Line Necessity Yes, meets criteria 10/2/2019  4:00 AM   Number of days: 1        Assessment:   ASA SCORE: 3    H&P: History and physical reviewed and following examination; no interval change.   Smoking Status:  Non-Smoker/Unknown   NPO Status: NPO Appropriate     Plan:   Anes. Type:  General   Pre-Medication: None   Induction:  IV (Standard)   Airway: ETT; Oral   Access/Monitoring: PIV   Maintenance: TIVA     Postop Plan:   Postop Pain: Opioids  Postop Sedation/Airway: Not planned  Disposition: Inpatient/Admit "     PONV Management:   Adult Risk Factors: Female, Non-Smoker, Postop Opioids   Prevention: Ondansetron, Dexamethasone, Propofol, No Volatiles     CONSENT: Direct conversation   Plan and risks discussed with: Patient   Blood Products: Consent Deferred (Minimal Blood Loss)                   Sergey Hollingsworth MD

## 2019-10-02 NOTE — OR NURSING
Pt in severe pain. Wakes up writhing in Right arm pain. Goes back to sleep after meds are given then repeats cycle. Severe pain in arm whenever she wakes up.

## 2019-10-02 NOTE — PROGRESS NOTES
Patient seen post-operatively. She is currently sleeping and I decided not to wake her up. Per nurse, she has been pretty sleepy since arrival on floor around 2pm. No oral intake yet.     PE  Gen; nad, sleeping  Pul: normal respiratory effort. No stridor. No gurgling    A/P  POD 0 s/p tonsillectomy    - pain management per pain team.   - from ENT perspective, we are okay with Tylenol, ibuprofen/toradol, topical lidocaine swish and gargles, and IV steroids (decadron 10mg IV q8hr x 3 doses max)  - encourage hydration as much as possible. Straws are okay.   - soft/purée consistency foods for 2 weeks

## 2019-10-02 NOTE — OR NURSING
Dr Hollingsworth MDA here to evaluate pt's pain. Order received to give up to a total of 3mg dilaudid in Pacu if needed.

## 2019-10-02 NOTE — PLAN OF CARE
Afebrile. VSS. No c/o pain or nausea. Port infusing well. Good PO intake. Pt NPO at 0000. Good UOP. No stool this shift. Preop scrub completed. No parent at bedside. Hourly rounding completed. Will continue to monitor.

## 2019-10-02 NOTE — OP NOTE
PACU to Inpatient Nursing Handoff    Patient Jennifer Cervantes is a 19 year old female who speaks English.   Procedure Procedure(s):  Bilateral Tonsillectomy,  Right Elbow Flexor Lengthening, Flexor Pronator Slide Of Wrist and Finger, Thumb Adductor Lengthening   Surgeon(s) Primary: Farhana Guy MD     Allergies   Allergen Reactions     Fish-Derived Products Hives     Seafood Hives     Diagnostic X-Ray Materials      PN: sickle cell     Fish Allergy      Gadolinium Other (See Comments)     Tongue swelling       Isolation  No active isolations     Past Medical History   has a past medical history of Anemia, Anxiety, Bleeding disorder (H), Blood clotting disorder (H), Cerebral infarction (H), Depressive disorder, Migraines, and Uncomplicated asthma.    Anesthesia General   Dermatome Level     Preop Meds Not applicable   Nerve block Not applicable   Intraop Meds dexamethasone (Decadron)  fentanyl (Sublimaze): 300 mcg total  hydromorphone (Dilaudid): 0.5 mg total  ketorolac (Toradol): last given at 1050  ondansetron (Zofran): last given at 1048   Local Meds No   Antibiotics cefazolin (Ancef) - last given at 1051     Pain Patient Currently in Pain: yes  Comfort: tolerable with discomfort  Pain Control: partially effective   PACU meds  fentanyl (Sublimaze): 100 mcg (total dose) last given at 1155    PCA / epidural Yes. PCA - hydromorphone (Dilaudid)   Capnography     Telemetry ECG Rhythm: Sinus rhythm   Inpatient Telemetry Monitor Ordered? No        Labs Glucose Lab Results   Component Value Date    GLC 99 10/01/2019       Hgb Lab Results   Component Value Date    HGB 11.8 10/01/2019       INR No results found for: INR   PACU Imaging Not applicable     Wound/Incision Incision/Surgical Site 10/02/19 Right Elbow (Active)   Incision Assessment UTV 10/2/2019 12:33 PM   Closure Adhesive strip(s);Sutures 10/2/2019 10:50 AM   Incision Care Adhesive strips applied 10/2/2019 10:49 AM   Dressing Intervention Clean, dry,  intact 10/2/2019 12:33 PM   Number of days: 0       Incision/Surgical Site 10/02/19 Right Hand (Active)   Incision Assessment UTV 10/2/2019 12:33 PM   Dressing Intervention Clean, dry, intact 10/2/2019 12:33 PM   Number of days: 0      CMS        Equipment right arm cast   Other LDA       IV Access Port A Cath Single 10/01/19 Left Chest wall (Active)   Access Date 10/01/19 10/1/2019  3:45 PM   Access Attempts 1 10/1/2019  3:45 PM   Gauge/Length 20 gauge;3/4 inch 10/1/2019  3:45 PM   Site Assessment WDL 10/2/2019 12:33 PM   Line Status Infusing 10/2/2019 12:33 PM   Extravasation? No 10/2/2019  4:00 AM   Dressing Intervention Transparent 10/2/2019  4:00 AM   Dressing change due 10/08/19 10/2/2019  4:00 AM   Needle Change Due 10/08/19 10/2/2019  4:00 AM   Line Necessity Yes, meets criteria 10/2/2019  4:00 AM   Number of days: 1      Blood Products Not applicable EBL 10 mL   Intake/Output Date 10/02/19 0700 - 10/03/19 0659   Shift 5919-9332 9572-6577 5555-1898 24 Hour Total   INTAKE   P.O. 200   200   I.V. 1150   1150   Shift Total(mL/kg) 1350(20.52)   1350(20.52)   OUTPUT   Blood 10   10   Shift Total(mL/kg) 10(0.15)   10(0.15)   Weight (kg) 65.8 65.8 65.8 65.8      Drains / Lopez     Time of void PreOp Void Prior to Procedure: 0720 (10/02/19 0739)    PostOp Voided (mL): 500 mL (10/02/19 0626)  Urine Occurrence: 1(did not have a hat in toilet to save urine) (10/02/19 0000)    Diapered? No   Bladder Scan      mL (10/02/19 1211)  tolerating sips     Vitals    B/P: 121/74  T: 97.9  F (36.6  C)    Temp src: Axillary  P:  Pulse: 79 (10/02/19 1245)    Heart Rate: 85 (10/02/19 1245)     R: 18  O2:  SpO2: 99 %    O2 Device: None (Room air) (10/02/19 1245)    Oxygen Delivery: 2 LPM (10/02/19 1245)         Family/support present mother   Patient belongings     Patient transported on cart   DC meds/scripts (obs/outpt) Not applicable   Inpatient Pain Meds Released? Yes       Special needs/considerations severe pain in pacu,  pca set up with continuous dose of 0.2 mg hr and 0.2mg q 10 min prn but an hourly limit of 1.0 mg.   Tasks needing completion None       Wm Sanches RN  ASCOM [id number]

## 2019-10-02 NOTE — OP NOTE
Pre-operative diagnosis: Chronic tonsillitis    Post-operative procedure: same    Procedure: bilateral tonsillectomy     Surgeons: Farhana Guy MD    Assistants: none    Anesthesia: general    EBL: 2 ml    Specimens: right and left tonsils    Complications: none    Indications: 19 year old female with history of recurrent/chronic tonsillitis    Findings: 4+ tonsils with tonsil stones present.  No adenoid tissue present.  Minimal bleeding during the procedure     Description: Patient was brought into the operating room and placed on the operating table.  A member of the department of anesthesia was present and intubated the patient without difficult.  A time-out was taken to identify the procedure and patient. The table was turned 90 degrees and a shoulder roll was placed.  A McIvor mouth retractor was placed and used to expose the oropharynx.  3 ml of 0.5% bupivicaine with epi was injected into the soft palate.  The left tonsil was grasped with an allis clamp and medialized.  An incision was made along the anterior tonsillar pillar and the tonsillar capsule was identified.  The tonsil was dissected away from the underlying musculature using bovie cautery.  The left tonsil was passed off the field.  The right tonsil was removed in a similar fashion.      The nasopharynx was visualized with a mirror.  She had no significant adenoid tissue.    Patient was turned over to Dr. Franco for the orthopedic portion of the procedure.

## 2019-10-02 NOTE — PROGRESS NOTES
Spoke with Blue Team regarding post-op care for patient.  She already has pain team following with her.  I also recommend a dose of decadron 10 mg later this evening.  She can also try 2% viscous lidocaine swish and gargles q2 hours PRN.  Liquid tylenol and ibuprofen would also be okay from an ENT standpoint.      She will need clear diet for 2-4 hours after procedure and then can advance to soft/puree diet later on in the day.  Patient will need to be on a soft/puree diet for 2 weeks.      I typically also discharge the patient with prednisone 20mg PO QDay x 5 days in addition to appropriate pain medications.  I expect significiant sore throat for 2 weeks.      If patient spits or coughs up blood, please notify ENT immediately.      Patient's right arm will be casted and should be elevated after surgery.

## 2019-10-02 NOTE — PROGRESS NOTES
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 Difficulty with pain control in PACU, sedated on arrival to the floor.     Diagnosis  Types and sources of pain:  - Nociceptive: acute post operative pain secondary to above procedures  - Neuropathic: nerve involvement in surgery, potential exists  - Psycho-social-spiritual-emotional: fear of stigma and worry around being identified as \"drug-seeking\" in an unfamiliar facility compounds anxiety, which may negatively impact pain  - Chronic: history of multiple sickle cell crises per year; chronic back pain, improving after breast reduction late spring/early summer  Advance care planning:   - Not appropriate to address at this visit. Assessments will be ongoing.  Discharge planning: TBD    Recommendations  POSTOPERATIVE PAIN MANAGEMENT AND RECOMMENDATIONS:   Simple Analgesia:  - Acetaminophen 15 mg/kg IV Q6h scheduled. Change to oral route as soon as able. Continue for the first 72 hours  - NSAID: if ok with ortho and ENT surgery, " start ketorolac 30 mg IV Q6h scheduled (patient received one dose intraoperatively)  - alternatively, team may consider  celecoxib 200 mg po once daily (previously on 100 mg po twice daily, changed to one dose due to T&A and anticipated difficulty swallowing)     Opioid Analgesia:(IV hydromorphone PCA per recommendations from her hematologist, appropriate given extremity surgery combined with tonsillectomy; initial dosing based on review of prior records for admission for sickle cell crises)  - Start hydromorphone IV PCA as follows:  PCA dose: 0.2 mg (200 mcg)  Lockout interval: 10 minutes  Continuous rate: 0.2 mg/hr (200 mcg/hr)  One hour dose limit: 1 mg (i.e. 3 boluses allowed in 1 hour)     - Titrate PCA by 25-50% for sedation/desired wean (see Opioid Titration below).   - If INadequate pain control WITHOUT signs of over-sedation (difficulty to arouse and keep awake, decreased respiratory rate, dropping oxygen saturations in the setting of decreased respiratory rate), please increase as follows:  STEP-UP ONE: hydromorphone, 0.3 PCA dose, 10 min lockout, 0.3 mg/hr con't rate, 1-hour maximum dose of 1.5 mg (4 boluses allowed in 1 hour)  STEP-UP TWO: hydromorphone, 0.4 mg PCA dose, 10 min lockout, 0.4 mg/hr con't rate, 1-hour maximum dose of 2 mg (4 boluses allowed in 1 hour)  STEP-UP THREE: Contact the PACCT provider on call for assistance  - If adequate pain control WITH MILD signs of over-sedation, please decrease dose (and PRNs) to the previous step.  If still at the starting rate/dose of 0.2 mg/hr + 0.2 mcg, please decrease as follows:  STEP-DOWN ONE: hydromorphone, 0.15 mg PCA dose, 10 min lockout, 0.15 mg/hr con't rate, 1-hour maximum dose of 0.75 mg (4 boluses allowed in 1 hour)  STEP-DOWN TWO: Discontinue continuous infusion.  Maintain PCA dose at 0.15 mg, with a 1-hour maximum dose of 0.6 mg (4 boluses allowed in 1 hour) with a lockout interval of 10 min.  - If INadequate pain control WITH signs of  "oversedation, please contact the PACCT provider on call for further instruction.     ADJUVANT ANALGESIA  - Continue home gabapentin, 600 mg TID as soon as she is able to have oral medications. Capsules may be opened and sprinkled on sherbet or other approved food  - Diazepam 2.5 mg IV Q6h scheduled + Q6h PRN muscle spasms  - Increase diazepam to 4 mg IV Q6h + 2.5 mg IV Q6h PRN for inadequate response to muscle spasms and no sedation with doses.   - If inadequate response and/or adverse effects, change to methocarbamol 750 mg IV QID for muscle spasms.  Change to 1000 mg IV QID for inadequate response to muscle spasms and no sedation with doses  - Consider hydroxyzine 25 mg IV Q6h PRN pain as an opioid-sparing adjuvant  - decadron per ENT  - lidocaine swish/gargle per ENT     ADVERSE REACTION MANAGEMENT  Constipation: PLEASE SCHEDULE as soon as able to take oral medications:  \"Mush\": Start polyethylene glycol 3350, 17g daily  \"Push\": Start senna, 5 mls or 1 tab HS  \"Uncorking\": Consider dulcolax suppository or Fleet enema if no stool in 24 hours.  Pruritus: For opioid-induced pruritus, start a naloxone continuous infusion at 0.5 mcg/kg/hr.  Can titrate upwards (usually done in 0.5 mcg/kg/hr increments) till a maximum dose of 2 mcg/kg/hr is reached.  If pruritus is still a distressing symptom at maximum doses of naloxone, contact the PACCT provider on call for assistance with an opioid rotation.  Note that opioid-induced pruritus is NOT a histamine-mediated reaction, therefore antihistamines (such as diphenhydramine/Benadryl ) are generally ineffective in resolving this symptom.  Nausea/Vomiting: recommend PRN ondansetron as first line treatment     Non-medication strategies:   - Child Life Specialist, mental health provider, and/or caregiver support, when appropriate and available   - Allow choices where permitted, positioning, rapport builiding   - Cognitive: auditory stimuli (e.g. tablets), control, controlled " breathing, distraction, imagery, hypnosis (by trained provider only), modeling, relaxation, prepare for coping techniques and/or teaching procedures, relaxation   - Biophysical: environmental modification, holding, touching, massage, heat or cold application   - Distraction: music, TV, video games, guided imagery, deep breathing, conversation  Other considerations: Older patients may or may not want caregiver presence. Establish rapport to increase cooperation. Allow to watch procedure, if requested    The above recommendations are based on the WHO Guidelines for the Pharmacological Treatment of Persisting Pain in Children with Medical Illnesses: (1) using a two-step strategy, (2) dosing at regular intervals, (3) using the appropriate route of administration, and (4) adapting treatment to the individual child (available at: http://apps.who.int/iris/Signal Vinetream/[email or web address]_Guidelines.pdf).    Thank you for the opportunity to participate in the care of Jennifer and her family.  Please contact the Pain and Advanced/Complex Care Team (PACCT) with any emergent needs via text page to the PACCT general pager ([phone], answered 8-4:30 Monday to Friday).  After 4:30 pm and on weekends/holidays, please refer to the [facility] or [place] on-call schedule.    The above assessment and recommendations were discussed with Jennifer's care team.  All parties involved agree with the above recommendations.  A total of 40 minutes were spent face-to-face with and in the coordination of care of Jennifer Cervantes.  Greater than 50% of my time on the unit was spent counseling the patient and/or coordinating care.    Echo Iniguez, NP, APRN CNP   Pain and Advanced/Complex Care Team (PACCT)  [facility]  PACCT Pager: [phone]    SUBJECTIVE: Interim History  No acute events overnight. OR today for procedures. Multiple PRN hydromorphone doses in PACU for severe pain, now sedated on  arrival to the floor.    OBJECTIVE: Last 24 hours (from 08:00 yesterday morning to 08:00 this morning)  VITALS: Reviewed; all vital signs were within normal limits for age.  INS/OUTS:   Taking PO? no  Bowel movements? yes (Last documented bowel movement: none since admission)  PAIN (0-10 Numeric Pain Rating Scale, with 10 being the worst pain imaginable): Unable to rate    Current Medications  I have reviewed this Jennifer's medication profile and medications during this hospitalization.    Current Facility-Administered Medications   Medication     [Auto Hold] albuterol (PROAIR HFA/PROVENTIL HFA/VENTOLIN HFA) 108 (90 Base) MCG/ACT inhaler 2 puff     [Auto Hold] aspirin EC tablet 81 mg     bupivacaine (MARCAINE) 0.25% preservative free injection     bupivacaine 0.5 % -  EPINEPHrine 1:200,000 injection     ceFAZolin (ANCEF) 1 g vial to attach to  ml bag for ADULT or 50 ml bag for PEDS     [Auto Hold] deferasirox (JADENU) tablet 900 mg - PATIENT SUPPLIED MEDICATION     dextrose 5% and 0.45% NaCl infusion     [Auto Hold] EPINEPHrine (ADRENALIN) kit 0.3 mg     [Auto Hold] erythromycin ethylsuccinate (E.E.S.) tablet 400 mg     fentaNYL (PF) (SUBLIMAZE) injection 25-50 mcg     [Auto Hold] gabapentin (NEURONTIN) capsule 600 mg     [Auto Hold] heparin 100 UNIT/ML injection 5 mL     [Auto Hold] heparin lock flush 10 UNIT/ML injection 3-6 mL     [Auto Hold] heparin lock flush 10 UNIT/ML injection 3-6 mL     HYDROmorphone (DILAUDID) PCA 0.2 mg/mL OPIOID TOLERANT     HYDROmorphone (PF) (DILAUDID) injection 0.3-0.5 mg     [Auto Hold] hydroxyurea (HYDREA) capsule CHEMO 2,000 mg     lactated ringers infusion     [Auto Hold] lidocaine (LMX4) cream     [Auto Hold] lidocaine 1 % 0.1-1 mL     naloxone (NARCAN) injection 0.1-0.4 mg     naloxone (NARCAN) injection 0.1-0.4 mg     [Auto Hold] omeprazole (priLOSEC) CR capsule 40 mg     ondansetron (ZOFRAN-ODT) ODT tab 4 mg    Or     ondansetron (ZOFRAN) injection 4 mg     [Auto Hold]  sennosides (SENOKOT) tablet 1 tablet     [Auto Hold] sertraline (ZOLOFT) tablet 50 mg     [Auto Hold] sodium chloride (PF) 0.9% PF flush 0.2-10 mL     [Auto Hold] sodium chloride (PF) 0.9% PF flush 10 mL       Medications related to this visit are as follows (with PRN use indicated from 08:00 yesterday morning to 08:00 this morning): n/a - just returned from PACU    Physical Examination  Sleeping soundly in bed, oxymask in place. Occasional snoring. Right arm in cast.  Deferred further exam    Laboratory/Imaging/Pathology  CHEMISTRY  AST   Date Value Ref Range Status   10/01/2019 47 (H) 0 - 45 U/L Final     ALT   Date Value Ref Range Status   10/01/2019 47 0 - 50 U/L Final     Creatinine   Date Value Ref Range Status   10/01/2019 0.61 0.52 - 1.04 mg/dL Final       Estimated Creatinine Clearance: 152.8 mL/min (based on SCr of 0.61 mg/dL).    HEMATOLOGY  Platelet Count   Date Value Ref Range Status   10/01/2019 205 150 - 450 10e9/L Final     WBC   Date Value Ref Range Status   10/01/2019 13.2 (H) 4.0 - 11.0 10e9/L Final     Hemoglobin   Date Value Ref Range Status   10/01/2019 11.8 11.7 - 15.7 g/dL Final       All other laboratory values, medical imaging and pathology reports acquired/resulted in the last 24 hours were reviewed.  Refer to the EMR for any details not referenced above.

## 2019-10-03 ENCOUNTER — APPOINTMENT (OUTPATIENT)
Dept: PHYSICAL THERAPY | Facility: CLINIC | Age: 19
DRG: 500 | End: 2019-10-03
Attending: STUDENT IN AN ORGANIZED HEALTH CARE EDUCATION/TRAINING PROGRAM
Payer: COMMERCIAL

## 2019-10-03 DIAGNOSIS — M79.601 RIGHT ARM PAIN: Primary | ICD-10-CM

## 2019-10-03 PROCEDURE — 97530 THERAPEUTIC ACTIVITIES: CPT | Mod: GP

## 2019-10-03 PROCEDURE — 99221 1ST HOSP IP/OBS SF/LOW 40: CPT | Performed by: NURSE PRACTITIONER

## 2019-10-03 PROCEDURE — 25000131 ZZH RX MED GY IP 250 OP 636 PS 637: Performed by: STUDENT IN AN ORGANIZED HEALTH CARE EDUCATION/TRAINING PROGRAM

## 2019-10-03 PROCEDURE — 94640 AIRWAY INHALATION TREATMENT: CPT

## 2019-10-03 PROCEDURE — 25000132 ZZH RX MED GY IP 250 OP 250 PS 637: Performed by: STUDENT IN AN ORGANIZED HEALTH CARE EDUCATION/TRAINING PROGRAM

## 2019-10-03 PROCEDURE — 25000128 H RX IP 250 OP 636: Performed by: STUDENT IN AN ORGANIZED HEALTH CARE EDUCATION/TRAINING PROGRAM

## 2019-10-03 PROCEDURE — 99233 SBSQ HOSP IP/OBS HIGH 50: CPT | Performed by: NURSE PRACTITIONER

## 2019-10-03 PROCEDURE — 97162 PT EVAL MOD COMPLEX 30 MIN: CPT | Mod: GP

## 2019-10-03 PROCEDURE — 40000275 ZZH STATISTIC RCP TIME EA 10 MIN

## 2019-10-03 PROCEDURE — 25800025 ZZH RX 258: Performed by: STUDENT IN AN ORGANIZED HEALTH CARE EDUCATION/TRAINING PROGRAM

## 2019-10-03 PROCEDURE — 12000014 ZZH R&B PEDS UMMC

## 2019-10-03 PROCEDURE — 25000128 H RX IP 250 OP 636: Performed by: PEDIATRICS

## 2019-10-03 RX ORDER — ACETAMINOPHEN 10 MG/ML
1000 INJECTION, SOLUTION INTRAVENOUS ONCE
Status: COMPLETED | OUTPATIENT
Start: 2019-10-03 | End: 2019-10-03

## 2019-10-03 RX ORDER — ACETAMINOPHEN 10 MG/ML
1000 INJECTION, SOLUTION INTRAVENOUS ONCE
Status: DISCONTINUED | OUTPATIENT
Start: 2019-10-03 | End: 2019-10-03

## 2019-10-03 RX ORDER — ERYTHROMYCIN ETHYLSUCCINATE 400 MG/1
400 TABLET ORAL
Status: DISCONTINUED | OUTPATIENT
Start: 2019-10-03 | End: 2019-10-06

## 2019-10-03 RX ORDER — DIAZEPAM 10 MG/2ML
2.5 INJECTION, SOLUTION INTRAMUSCULAR; INTRAVENOUS EVERY 6 HOURS
Status: DISCONTINUED | OUTPATIENT
Start: 2019-10-03 | End: 2019-10-04

## 2019-10-03 RX ORDER — PREDNISONE 20 MG/1
20 TABLET ORAL DAILY
Status: COMPLETED | OUTPATIENT
Start: 2019-10-03 | End: 2019-10-07

## 2019-10-03 RX ORDER — ACETAMINOPHEN 10 MG/ML
1000 INJECTION, SOLUTION INTRAVENOUS EVERY 6 HOURS
Status: DISCONTINUED | OUTPATIENT
Start: 2019-10-03 | End: 2019-10-04

## 2019-10-03 RX ADMIN — DEXAMETHASONE SODIUM PHOSPHATE 10 MG: 4 INJECTION, SOLUTION INTRAMUSCULAR; INTRAVENOUS at 09:38

## 2019-10-03 RX ADMIN — FAMOTIDINE 20 MG: 20 INJECTION, SOLUTION INTRAVENOUS at 16:44

## 2019-10-03 RX ADMIN — DEXAMETHASONE SODIUM PHOSPHATE 10 MG: 4 INJECTION, SOLUTION INTRAMUSCULAR; INTRAVENOUS at 02:48

## 2019-10-03 RX ADMIN — FAMOTIDINE 20 MG: 20 INJECTION, SOLUTION INTRAVENOUS at 04:35

## 2019-10-03 RX ADMIN — HYDROXYZINE HYDROCHLORIDE 25 MG: 25 INJECTION, SOLUTION INTRAMUSCULAR at 10:39

## 2019-10-03 RX ADMIN — DIAZEPAM 2.5 MG: 5 INJECTION, SOLUTION INTRAMUSCULAR; INTRAVENOUS at 09:45

## 2019-10-03 RX ADMIN — DEXTROSE AND SODIUM CHLORIDE: 5; 450 INJECTION, SOLUTION INTRAVENOUS at 22:36

## 2019-10-03 RX ADMIN — NALOXONE HYDROCHLORIDE 0.5 MCG/KG/HR: 0.4 INJECTION, SOLUTION INTRAMUSCULAR; INTRAVENOUS; SUBCUTANEOUS at 15:15

## 2019-10-03 RX ADMIN — KETOROLAC TROMETHAMINE 30 MG: 30 INJECTION, SOLUTION INTRAMUSCULAR at 06:37

## 2019-10-03 RX ADMIN — FLUTICASONE FUROATE AND VILANTEROL TRIFENATATE 1 PUFF: 100; 25 POWDER RESPIRATORY (INHALATION) at 13:25

## 2019-10-03 RX ADMIN — ACETAMINOPHEN 1000 MG: 10 INJECTION, SOLUTION INTRAVENOUS at 09:54

## 2019-10-03 RX ADMIN — ACETAMINOPHEN 1000 MG: 10 INJECTION, SOLUTION INTRAVENOUS at 16:17

## 2019-10-03 RX ADMIN — KETOROLAC TROMETHAMINE 30 MG: 30 INJECTION, SOLUTION INTRAMUSCULAR at 12:09

## 2019-10-03 RX ADMIN — PREDNISONE 20 MG: 20 TABLET ORAL at 20:26

## 2019-10-03 RX ADMIN — DEXTROSE AND SODIUM CHLORIDE: 5; 450 INJECTION, SOLUTION INTRAVENOUS at 01:07

## 2019-10-03 RX ADMIN — DIAZEPAM 2.5 MG: 5 INJECTION, SOLUTION INTRAMUSCULAR; INTRAVENOUS at 21:12

## 2019-10-03 RX ADMIN — KETOROLAC TROMETHAMINE 30 MG: 30 INJECTION, SOLUTION INTRAMUSCULAR at 19:00

## 2019-10-03 RX ADMIN — KETOROLAC TROMETHAMINE 30 MG: 30 INJECTION, SOLUTION INTRAMUSCULAR at 01:01

## 2019-10-03 RX ADMIN — DIAZEPAM 2.5 MG: 5 INJECTION, SOLUTION INTRAMUSCULAR; INTRAVENOUS at 16:16

## 2019-10-03 RX ADMIN — DEXTROSE AND SODIUM CHLORIDE: 5; 450 INJECTION, SOLUTION INTRAVENOUS at 12:24

## 2019-10-03 RX ADMIN — ACETAMINOPHEN 1000 MG: 10 INJECTION, SOLUTION INTRAVENOUS at 22:15

## 2019-10-03 RX ADMIN — Medication: at 10:11

## 2019-10-03 RX ADMIN — NALOXONE HYDROCHLORIDE 0.5 MCG/KG/HR: 0.4 INJECTION, SOLUTION INTRAMUSCULAR; INTRAVENOUS; SUBCUTANEOUS at 10:04

## 2019-10-03 NOTE — PLAN OF CARE
Afebrile, VSS.  Pt. Sleeping through the night and did not report any pain.  Refusing oral tylenol, MD aware.  Had one large loose stool overnight.  Continue to monitor.  Notify team of any changes or concerns.

## 2019-10-03 NOTE — CONSULTS
Pediatric Integrative Medicine Initial Consultation    Primary Care provider: Jeimy Decker  Consulting Provider: Tati Pope MD and Belinda Conner MD    Reason for consultation: I was asked to see this patient for non-pharmacologic recommendations that may be of assistance for pain and itching.     Assessment:  Jennifer is a 19 year old female patient with HbSS disease and complex PMH POD#1 s/p tonsillectomy and right elbow flexor lengthening, flexor pronator slide of wrist and finger & thumb adductor lengthening. She has a history of anxiety, asthma, and chronic low back pain who would benefit from non-pharmacologic interventions to help with symptom management and support overall treatment plan.    Plan:  1. Body pain and discomfort  -Introduced Pediatric Integrative Health & Wellbeing Program and services we offer.  -Introduced idea of breathing techniques and relaxation strategies; patient declined all offerings.  -Patient did express interest in massage therapy services for tomorrow as patient was falling asleep at end of visit. Patient requests lavender massage essential oil for the massage services tomorrow.     2. Right upper extremity itching  -Patient was offered to be taught distraction techniques to help decrease itching but she declined.  -Ice packs were encouraged for itching but she declined this as well.     Follow-up:  We will continue to support during this admission as is clinically possible.     Thank you for this consultation. Please do not hesitate to contact me with any questions or concerns.     History of Present Illness: Jennifer Cervantes is a 19 year old female with HbSS disease on chronic transfusion program for secondary stroke prevention and complex PMH who is usually treated at [facility]. She was admitted 10/1 in preparation for for planned surgical procedures (10/2) with ENT -- Dr. Guy (tonsillectomy) and Ortho -- Dr. Franco ( s/p tonsillectomy and right elbow  "flexor lengthening, flexor pronator slide of wrist and finger & thumb adductor lengthening). Now POD#1 with need close monitoring of hemoglobin and pain control. She is not accompanied at this visit by any family members.    Jennifer reports during this visit that she is not a \"people person\" and dislikes having people ask her questions and invade her space. She \"hates\" the Integrative Medicine Team at [facility] as she states that all they did was come in to her room when she was in the most pain and would \"bug\" her with questions and trying to get her to do deep breathing and relaxation techniques. She reports \"those things never help me.\" She does acknowledge that this provider seeing her is part of the Integrative Medicine Team, but feels since I am new to her and can offer massage therapy services, she is fine seeing me. She reports feeling very itchy in her right upper extremity (cast currently in place), back pain, and overall body pain. Her pain is reported as being the color red, and she is unable to determine what color it would be if it were to decrease. She denies having pets in the home, a favorite song, favorite show or movie, and favorite food. Her favorite hobby is to sleep. When asked if it is because she is in pain at home, she reports, \"no, I just like to sleep all the time is all.\" She denies being involved in any physical exercise/sports, social events, or activities outside of the home.     Review of systems: The Comprehensive ROS was performed and is negative except as noted below and in the HPI.    ALLERGIES:  Allergies   Allergen Reactions     Fish-Derived Products Hives     Seafood Hives     Diagnostic X-Ray Materials      PN: sickle cell     Fish Allergy      Gadolinium Other (See Comments)     Tongue swelling       IMMUNIZATIONS:  Immunization History   Administered Date(s) Administered     Influenza Vaccine IM > 6 months Valent IIV4 09/25/2019       CURRENT MEDICATIONS:  Current " Facility-Administered Medications   Medication     acetaminophen (OFIRMEV) infusion 1,000 mg     albuterol (PROAIR HFA/PROVENTIL HFA/VENTOLIN HFA) 108 (90 Base) MCG/ACT inhaler 2 puff     aspirin EC tablet 81 mg     deferasirox (JADENU) tablet 900 mg - PATIENT SUPPLIED MEDICATION     dextrose 5% and 0.45% NaCl infusion     diazepam (VALIUM) injection 2.5 mg     diazepam (VALIUM) injection 2.5 mg     EPINEPHrine (ADRENALIN) kit 0.3 mg     erythromycin ethylsuccinate (E.E.S.) tablet 400 mg     famotidine (PEPCID) infusion 20 mg     fluticasone-vilanterol (BREO ELLIPTA) 100-25 MCG/INH inhaler 1 puff     gabapentin (NEURONTIN) capsule 600 mg     heparin 100 UNIT/ML injection 5 mL     heparin lock flush 10 UNIT/ML injection 3-6 mL     heparin lock flush 10 UNIT/ML injection 3-6 mL     HYDROmorphone (DILAUDID) PCA 0.2 mg/mL OPIOID TOLERANT     hydroxyurea (HYDREA) capsule CHEMO 2,000 mg     hydrOXYzine (VISTARIL) injection PEDS/NICU 25 mg     ketorolac (TORADOL) injection 30 mg     lidocaine (LMX4) cream     lidocaine (XYLOCAINE) 2 % solution 5 mL     lidocaine 1 % 0.1-1 mL     naloxone (NARCAN) 0.01 mg/mL in D5W 50 mL infusion     naloxone (NARCAN) injection 0.1-0.4 mg     omeprazole (priLOSEC) CR capsule 40 mg     sennosides (SENOKOT) tablet 1 tablet     sertraline (ZOLOFT) tablet 50 mg     sodium chloride (PF) 0.9% PF flush 0.2-10 mL     sodium chloride (PF) 0.9% PF flush 10 mL       PAST MEDICAL HISTORY:  Active Ambulatory Problems     Diagnosis Date Noted     Sickle cell disease (H) 11/22/2011     Moderate persistent asthma without complication 05/03/2019     Anxiety 05/03/2019     Constipation 05/03/2019     Resolved Ambulatory Problems     Diagnosis Date Noted     No Resolved Ambulatory Problems     Past Medical History:   Diagnosis Date     Anemia      Bleeding disorder (H)      Blood clotting disorder (H)      Cerebral infarction (H)      Depressive disorder      Migraines      Uncomplicated asthma        PAST  "SURGICAL HISTORY:  Past Surgical History:   Procedure Laterality Date     REPAIR TENDON ELBOW Right 10/2/2019    Procedure: Right Elbow Flexor Lengthening, Flexor Pronator Slide Of Wrist and Finger, Thumb Adductor Lengthening;  Surgeon: Anai Franco MD;  Location: [facility] OR     TONSILLECTOMY Bilateral 10/2/2019    Procedure: Bilateral Tonsillectomy;  Surgeon: Farhana Guy MD;  Location: [facility] OR       FAMILY HISTORY:  No family history on file.    SOCIAL HISTORY:  Social History     Patient does not qualify to have social determinant information on file (likely too young).   Social History Narrative     Not on file       Physical Exam:   Temp:  [96.5  F (35.8  C)-97.9  F (36.6  C)] 97  F (36.1  C)  Pulse:  [61-70] 69  Heart Rate:  [68-73] 68  Resp:  [12-18] 16  BP: (103-114)/(53-69) 107/53  SpO2:  [94 %-97 %] 96 %  /53   Pulse 69   Temp 97  F (36.1  C) (Axillary)   Resp 16   Wt 65.8 kg (145 lb 1 oz)   LMP  (LMP Unknown)   SpO2 96%   BMI 24.49 kg/m    Vitals:    10/02/19 0620   Weight: 65.8 kg (145 lb 1 oz)        @  Wt Readings from Last 3 Encounters:   10/02/19 65.8 kg (145 lb 1 oz)   09/25/19 65.4 kg (144 lb 2.9 oz)   09/05/19 65.3 kg (144 lb)     Ht Readings from Last 2 Encounters:   09/25/19 1.639 m (5' 4.53\")   09/05/19 1.66 m (5' 5.35\")     Normalized BMI data available only for age 0 to 20 years.     TCM Pulses: Able to palpate left side only: Yin strong    TCM Tongue: Pink, prominent sublingual veins, pale coat    GENERAL: Alert, no acute distress. Sitting up in bed eating noodles.   SKIN: No significant rash, abnormal pigmentation or lesions  HEAD: Normocephalic.   EYES: Closing eyes intermittently throughout visit but remained talking.  NOSE: Nares without discharge.   MOUTH: MMM  LUNGS: Unlabored respirations on room air.  HEART: Regular rhythm.   ABDOMEN: Soft, non-tender, not distended.  EXTREMITIES: Full range of motion LUE. Moving bilateral lower extremities " independently. RUE in cast.  PSYCHOLOGICAL: Appropriate mood. Sleepy at end of visit.    Labs and Tests:  Results for orders placed or performed during the hospital encounter of 10/01/19 (from the past 24 hour(s))   CBC with platelets differential   Result Value Ref Range    WBC 18.1 (H) 4.0 - 11.0 10e9/L    RBC Count 3.90 3.8 - 5.2 10e12/L    Hemoglobin 11.2 (L) 11.7 - 15.7 g/dL    Hematocrit 34.2 (L) 35.0 - 47.0 %    MCV 88 78 - 100 fl    MCH 28.7 26.5 - 33.0 pg    MCHC 32.7 31.5 - 36.5 g/dL    RDW 19.9 (H) 10.0 - 15.0 %    Platelet Count 231 150 - 450 10e9/L    Diff Method Automated Method     % Neutrophils 92.4 %    % Lymphocytes 5.0 %    % Monocytes 1.8 %    % Eosinophils 0.0 %    % Basophils 0.2 %    % Immature Granulocytes 0.6 %    Nucleated RBCs 0 0 /100    Absolute Neutrophil 16.7 (H) 1.6 - 8.3 10e9/L    Absolute Lymphocytes 0.9 0.8 - 5.3 10e9/L    Absolute Monocytes 0.3 0.0 - 1.3 10e9/L    Absolute Eosinophils 0.0 0.0 - 0.7 10e9/L    Absolute Basophils 0.0 0.0 - 0.2 10e9/L    Abs Immature Granulocytes 0.1 0 - 0.4 10e9/L    Absolute Nucleated RBC 0.1         Time Spent on this Encounter   IRadha, spent a total of 40 minutes bedside and on the inpatient unit today managing the care of Jennifer Cervantes. Over 50% of my time on the unit was spent counseling the patient on non-pharmacologic strategies for pain and itching and coordinating care with primary team and Echo Iniguez DNP.. See note for details.    EITAN Anne, CPNP  Pediatric Nurse Practitioner  Pediatric Integrative Health and Wellbeing, [facility]    CC  Patient Care Team:  Jeimy Decker as PCP - General (Pediatrics)  Jamaal Collins MD as Referring Physician (Pediatric Hematology/Oncology)

## 2019-10-03 NOTE — PROGRESS NOTES
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 Guy (tonsillectomy) and Ortho -- Dr. Franco (right elbow flexor pronator plasty, right elbow brachialis lengthening, right elbow biceps tenotomy, right thumb thenar release). Now POD#1 with need for IV hydration, close monitoring of hemoglobin, and pain control.      Hematology  # History of left MCA stroke  # Hypercoagulability  # History of TIA  - Continue home ASA 81 mg qDay     # HbSS  - Continue home deferasirox 900 mg daily once able to PO  - Continue home erythromycin 400 mg TID once able to PO  - Continue home Hydroxyurea 2000 mg daily once able to PO     Neuro  # Chronic pain  # Acute post-operative pain  - PACCT team consulted for dilaudid PCA guidance, appreciate recs  - PT consulted for early ambulation  - Hydromorphone PCA at basal rate 0.3, bumps 0.3, 1 hour max of 1.5 mg. Increase per PACCT note.  - Scheduled tylenol q6h IV  - Scheduled toradol q6h IV  - Scheduled and PRN diazepam 2.5 mg q6h IV for muscle spasms  - Narcan IV infusion for itching 2/2 to dilaudid.     ENT  # S/p bilateral tonsillectomy  - 10 mg decadron IV x3 doses, now complete. Will do 5 days of prednisolone taper.   - 2% viscous lidocaine rinses q2h prn  - Soft diet x 2 weeks  - ENT must be paged immediately for any spitting up or coughing of blood     Resp  - Continue home albuterol 2 puffs q4h prn   - On symbicort BID at home. Started on Breo Elipta 1 puff daily.  - PT consult placed and encouraged incentive spirometry  - O2 sats >94% on RA. O2 mask PRN for desaturations.     FEN/GI  - D5  1/2 NS at 100 mL/hr  - Soft diet x 2 weeks     Access: port  Disposition: Pending post-operative recovery    Disposition Plan   Expected discharge: 2 - 3 days, recommended to prior living arrangement once adequate pain management/ tolerating PO medications.  Entered: Francesco Child 10/03/2019, 8:21 AM       The patient's care was discussed with the Attending Physician, Dr. Alvarez, Bedside Nurse and Patient.    Francesco Child  Medical Student  Peds Heme/Onc Service  [facility]      Resident/Fellow Attestation   I, Tati Pope, was present with the medical student who participated in the service and in the documentation of the note.  I have verified the history and personally performed the physical exam and medical decision making.  I agree with the assessment and plan of care as documented in the note.      Tati Pope MD  PGY3  Date of Service (when I saw the patient): 10/03/19    Physician Attestation   I, Pedro Alvarez, was present with the medical student who participated in the service and in the documentation of the note.  I have verified the history and personally performed the physical exam and medical decision making.  I agree with the assessment and plan of care as documented in the note.      I personally reviewed vital signs, medications and labs.    I agree with the assessment as noted.    Pedro Alvarez MD  Date of Service (when I saw the patient): 10/3/19      ______________________________________________________________________    Interval History   No acute events overnight and no desaturations. Continues to have significant pain localized to RUE and oropharynx. Itching resolved with initiation of Narcan drip. Had one bowel movement overnight. WBC increased to 18.1 from 13.2 yesterday. No other signs of infection, most likely 2/2 to steroids given postoperatively.    Data reviewed today: I reviewed all medications,  new labs and imaging results over the last 24 hours. I personally reviewed no images or EKG's today.    Physical Exam   Vital Signs: Temp: 97  F (36.1  C) Temp src: Axillary BP: 114/60 Pulse: 70 Heart Rate: 68 Resp: 16 SpO2: 95 % O2 Device: None (Room air) Oxygen Delivery: 3 LPM  Weight: 145 lbs 1 oz  Constitutional: sleepy, easily arousable, cooperative, no apparent distress.   Eyes: EOMs grossly intact, sclera clear, conjunctiva normal  ENT: Normocephalic, without obvious abnormality, atraumatic, sinuses nontender on palpation, oral pharynx with moist mucous membranes. No oral bleeding.   Hematologic / Lymphatic: no cervical lymphadenopathy and no supraclavicular lymphadenopathy  Respiratory: No increased work of breathing, good air exchange, clear to auscultation bilaterally, no crackles or wheezing  Cardiovascular: normal S1 and S2 and no edema  GI: active bowel sounds, non-distended, non-tender and no hepatosplenomegally  Neurologic: Awake, alert, oriented to name, place and time. Sensation intact distally in upper and lower extremities.    Data   Recent Labs   Lab 10/02/19  2004 10/01/19  1550   WBC 18.1* 13.2*   HGB 11.2* 11.8   MCV 88 87    205   NA  --  140   POTASSIUM  --  3.7   CHLORIDE  --  111*   CO2  --  24   BUN  --  9   CR  --  0.61   ANIONGAP  --  5   MICAH  --  7.7*   GLC  --  99   ALBUMIN  --  3.7   PROTTOTAL  --  7.5   BILITOTAL  --  1.7*   ALKPHOS  --  66   ALT  --  47   AST  --  47*     Medications     dextrose 5% and 0.45% NaCl 100 mL/hr at 10/03/19 0107     HYDROmorphone       naloxone (NARCAN) infusion PEDS LESS than 45 kg 0.5 mcg/kg/hr (10/03/19 0730)       acetaminophen  1,000 mg Intravenous Once     acetaminophen  650 mg Oral Q6H     aspirin  81 mg Oral Daily     deferasirox  900 mg Oral Daily     dexamethasone  10 mg Intravenous Q8H     erythromycin ethylsuccinate  400 mg Oral TID w/meals     famotidine  20 mg Intravenous Q12H     gabapentin  600 mg Oral TID     heparin  5 mL  Intracatheter Q28 Days     heparin lock flush  3-6 mL Intracatheter Q24H     hydroxyurea  2,000 mg Oral Daily     ketorolac  30 mg Intravenous Q6H     omeprazole  40 mg Oral Daily     sertraline  50 mg Oral Daily     sodium chloride (PF)  10 mL Intracatheter Q28 Days

## 2019-10-03 NOTE — OP NOTE
CC: POD 1 s/p Tonsillectomy (ENT) and Upper right arm soft tissue release (Ortho)    HPI: Patient reports sore throat and sore arm.  She is trying her best to eat and drink.  She feels sleepy.      PE:  GEN: nad, falls asleep easily but easily awakened.  O/C: tonsillar fossae with white and black appearance but no sign of bleeding.  Uvula swollen.  PUL: no stridor, regular respirations    Patient able to take 3 small sips of water and keep them down.      A/P:  Patient experiencing significant odynophagia as expected after tonsillectomy.    - talked with patient that we expected this amount of pain after surgery and that only time will make the pain go away.  Expectations of pain medication would be to help take the edge off the pain but would likely not make the pain go away completely.  - Given her degree of sleepiness, I would recommend backing off on some her narcotic pain medications  - Could try lidocaine swish and gargles [id number] PRN   - Would try to switch her over to oral pain medications starting tomorrow  - would discharge her on prednisone 20 mg PO Qday x 5 days in addition to tylenol, ibuprofen, and appropriate narcotics.  - asked nurse to encourage her to demonstrate that she can take sips of fluids.  The more she does this, the easier it will become.

## 2019-10-03 NOTE — PLAN OF CARE
Afebrile. VSS, pain rated 10/10 with scheduled pain meds and PCA pump. Took pudding PO intake, discussed on a clear liquid diet. Good UOP, no BM. Tolerating fluids. Valium (PRN meds) given x1. Plan to continue monitoring and manage pain. Hourly monitoring completed, will continue to monitor.

## 2019-10-03 NOTE — PROGRESS NOTES
"  Pediatric Pain & Advanced/Complex Care Team (PACCT)  Daily Progress Note    Jennifer Cervantes MRN#: [id number]   Age: 19 year old YOB: 2000   Date: 10/03/2019 Admission date: 10/1/2019     PROBLEMS/DIAGNOSES, ASSESSMENT, & RECOMMENDATIONS  Problems/Diagnoses  Jennifer Cervantes is a 19 year old female with the following problems and/or diagnoses:  Patient Active Problem List   Diagnosis     Sickle cell disease (H)     Moderate persistent asthma without complication     Anxiety     Constipation     Assessment  Overall: Jennifer Cervantes is a 19 year old opioid-naive female with hgbSS disease complicated by 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 Pain is currently localized to both her operative arm and her throat, and medications are being adjusted this morning. Despite high pain ratings on NRS, patient has been reported as calm and cooperative when awake which is different than her reported typical demeanor with poor pain control (self-described as angry and agitated). Overall, sleepy during the day concerning for over sedation, however she is much more alert this afternoon (~1600).     - Would have a low threshold for decreasing basal rate as below if she does not continue her pattern of being more awake this afternoon.     Diagnosis  Types and sources of pain:  - Nociceptive: acute post operative pain secondary to above procedures  - Neuropathic: nerve involvement in surgery, potential exists  - Psycho-social-spiritual-emotional: fear of stigma and worry around being identified as \"drug-seeking\" in an unfamiliar facility compounds anxiety, which may negatively impact " pain  - Chronic: history of multiple sickle cell crises per year; chronic back pain, improving after breast reduction late spring/early summer  Advance care planning:   - Not appropriate to address at this visit. Assessments will be ongoing.  Discharge planning: TBD    Recommendations  POSTOPERATIVE PAIN MANAGEMENT AND RECOMMENDATIONS:   Simple Analgesia:  - Acetaminophen 15 mg/kg IV Q6h scheduled. Change to oral route as soon as able. Continue for the first 72 hours  - NSAID: if ok with ortho and ENT surgery, start ketorolac 30 mg IV Q6h scheduled (patient received one dose intraoperatively)  - alternatively, team may consider  celecoxib 200 mg po once daily (previously on 100 mg po twice daily, changed to one dose due to T&A and anticipated difficulty swallowing)     Opioid Analgesia:(IV hydromorphone PCA per recommendations from her hematologist, appropriate given extremity surgery combined with tonsillectomy; initial dosing based on review of prior records for admission for sickle cell crises)  - hydromorphone IV PCA as follows: (increased this morning)  PCA dose: 0.3 mg (200 mcg)  Lockout interval: 10 minutes  Continuous rate: 0.3 mg/hr (200 mcg/hr)  One hour dose limit: 1 mg (i.e. 3 boluses allowed in 1 hour)     - Titrate PCA by 25-50% for sedation/desired wean (see Opioid Titration below).   - If INadequate pain control WITHOUT signs of over-sedation (difficulty to arouse and keep awake, decreased respiratory rate, dropping oxygen saturations in the setting of decreased respiratory rate), please increase as follows:  STEP-UP ONE: hydromorphone, 0.4 PCA dose, 10 min lockout, 0.3 mg/hr con't rate, 1-hour maximum dose of 1.7 mg (4 boluses allowed in 1 hour)  STEP-UP TWO: see ketamine recommendations below  STEP-UP THREE: Contact the PACCT provider on call for assistance  - If adequate pain control WITH MILD signs of over-sedation, please decrease dose (and PRNs) to the previous step.  If still at the above  rate/dose of 0.3 mg/hr + 0.3 mcg, please decrease as follows:  STEP-DOWN ONE: hydromorphone, 0.2-0.3 mg PCA dose, 10 min lockout, 0.2 mg/hr con't rate, 1-hour maximum dose of 1 mg (4 boluses allowed in 1 hour)  STEP-DOWN TWO: hydromorphone, 0.1-0.2 mg PCA dose (depending on clinical context), 10 min lockout, 0.1 mg/hr con't rate, 1-hour maximum dose of 1 mg (4 boluses allowed in 1 hour)  STEP-DOWN THREE: Discontinue continuous infusion.  Maintain PCA dose at 0.2 mg, with a 1-hour maximum dose of 0.6 mg (4 boluses allowed in 1 hour) with a lockout interval of 15 min.  - If INadequate pain control WITH signs of oversedation, please contact the PACCT provider on call for further instruction.  - tomorrow morning (10/4), consider starting oxycodone 10 mg po Q6h scheduled in place of PCA basal rate. Consider liquid over pill formulation, depending on ability to swallow     ADJUVANT ANALGESIA  - Continue home gabapentin, 600 mg TID as soon as she is able to have oral medications. Capsules may be opened and sprinkled on sherbet or other approved food  - Diazepam 2.5 mg IV Q6h scheduled + Q6h PRN muscle spasms  - Increase diazepam to 4 mg IV Q6h + 2.5 mg IV Q6h PRN for inadequate response to muscle spasms and no sedation with doses. Decrease to 2 mg IV Q6h + 2 mg IV Q6h PRN  - If inadequate response and/or adverse effects, change to methocarbamol 750 mg IV QID for muscle spasms.  Change to 1000 mg IV QID for inadequate response to muscle spasms and no sedation with doses  - hydroxyzine 25 mg IV Q6h PRN pain as an opioid-sparing adjuvant  - decadron per ENT  - lidocaine swish/gargle per ENT  - If continued suboptimal analgesia and sedation/or secondary to opioids, consider starting ketamine IV infusion 3 mg/hr (0.8 mcg/kg/min) for post-operative pain management (potentially in addition to decreasing PCA basal rate as above) as part of a multi-modal analgesia plan and is also opioid sparing. Note: per [employer], ketamine can  "be given in the same line as naloxone as well as hydromorphone (no information at Y-site but compatible in admixture)  - Adverse effects to monitor for include psychotomimetic effects (hallucinactions or dreamlike feelings). Psychotomimetic effects can be reduced or prevented by benzodiazepines. Additional things to monitor for include (but are not limited to) hypotension or hypertension and excessive sedation. At low doses, ketamine does NOT affect respiratory function. No weaning is neccessary when discontinuing.  - Okay to use ketamine on nursing unit.  Please refer to ketamine policy [email or web address]     ADVERSE REACTION MANAGEMENT  Constipation: PLEASE SCHEDULE as soon as able to take oral medications:  \"Mush\": Start polyethylene glycol 3350, 17g daily  \"Push\": Start senna, 5 mls or 1 tab HS  \"Uncorking\": Consider dulcolax suppository or Fleet enema if no stool in 24 hours.  Pruritus: For opioid-induced pruritus, start a naloxone continuous infusion at 0.5 mcg/kg/hr.  Can titrate upwards (usually done in 0.5 mcg/kg/hr increments) till a maximum dose of 2 mcg/kg/hr is reached.  If pruritus is still a distressing symptom at maximum doses of naloxone, contact the PACCT provider on call for assistance with an opioid rotation.  Note that opioid-induced pruritus is NOT a histamine-mediated reaction, therefore antihistamines (such as diphenhydramine/Benadryl ) are generally ineffective in resolving this symptom.  Nausea/Vomiting: recommend PRN ondansetron as first line treatment     Non-medication strategies:   - Child Life Specialist, mental health provider, and/or caregiver support, when appropriate and available   - provided patient with notepad and marker as a communication aid   - encourage cold soft foods for anesthetic benefit (ex: popsicles, sherbet, etc)   - Allow choices where permitted, positioning, rapport builiding   - " Cognitive: auditory stimuli (e.g. tablets), control, controlled breathing, distraction, imagery, hypnosis (by trained provider only), modeling, relaxation, prepare for coping techniques and/or teaching procedures, relaxation   - Biophysical: environmental modification, holding, touching, massage, heat or cold application   - Distraction: music, TV, video games, guided imagery, deep breathing, conversation  Other considerations: Older patients may or may not want caregiver presence. Establish rapport to increase cooperation. Allow to watch procedure, if requested    The above recommendations are based on the WHO Guidelines for the Pharmacological Treatment of Persisting Pain in Children with Medical Illnesses: (1) using a two-step strategy, (2) dosing at regular intervals, (3) using the appropriate route of administration, and (4) adapting treatment to the individual child (available at: http://apps.who.int/iris/bitstream/33122/98923/1/9789241548120_Guidelines.pdf).    Thank you for the opportunity to participate in the care of Jennifer and her family.  Please contact the Pain and Advanced/Complex Care Team (PACCT) with any emergent needs via text page to the PACCT general pager ([phone], answered 8-4:30 Monday to Friday).  After 4:30 pm and on weekends/holidays, please refer to the [facility] or [place] on-call schedule.    The above assessment and recommendations were discussed with Jennifer's care team.  All parties involved agree with the above recommendations.  A total of 35 minutes were spent face-to-face with and in the coordination of care of Jennifer Cervantes.  Greater than 50% of my time on the unit was spent counseling the patient and/or coordinating care.    Echo Iniguez, NP, APRN CNP   Pain and Advanced/Complex Care Team (PACCT)  [facility]  PACCT Pager: [phone]    SUBJECTIVE: Interim History  Returned from OR yesterday later afternoon, significant pain in  the evening, difficulty balancing pain and sedation. Slept well overnight, but significant pain this morning, localized both to her arm and her throat.    OBJECTIVE: Last 24 hours (from 08:00 yesterday morning to 08:00 this morning)  VITALS: Reviewed; all vital signs were within normal limits for age.  INS/OUTS:   Taking PO? no  Bowel movements? yes (Last documented bowel movement: 10/3, soft)  PAIN (0-10 Numeric Pain Rating Scale, with 10 being the worst pain imaginable): 10/10 at the time of my visit    Current Medications  I have reviewed this Jennifer's medication profile and medications during this hospitalization.    Current Facility-Administered Medications   Medication     acetaminophen (TYLENOL) solution 650 mg     albuterol (PROAIR HFA/PROVENTIL HFA/VENTOLIN HFA) 108 (90 Base) MCG/ACT inhaler 2 puff     aspirin EC tablet 81 mg     deferasirox (JADENU) tablet 900 mg - PATIENT SUPPLIED MEDICATION     dextrose 5% and 0.45% NaCl infusion     diazepam (VALIUM) injection 2.5 mg     diazepam (VALIUM) injection 2.5 mg     EPINEPHrine (ADRENALIN) kit 0.3 mg     erythromycin ethylsuccinate (E.E.S.) tablet 400 mg     famotidine (PEPCID) infusion 20 mg     fluticasone-vilanterol (BREO ELLIPTA) 100-25 MCG/INH inhaler 1 puff     gabapentin (NEURONTIN) capsule 600 mg     heparin 100 UNIT/ML injection 5 mL     heparin lock flush 10 UNIT/ML injection 3-6 mL     heparin lock flush 10 UNIT/ML injection 3-6 mL     HYDROmorphone (DILAUDID) PCA 0.2 mg/mL OPIOID TOLERANT     hydroxyurea (HYDREA) capsule CHEMO 2,000 mg     hydrOXYzine (VISTARIL) injection PEDS/NICU 25 mg     ketorolac (TORADOL) injection 30 mg     lidocaine (LMX4) cream     lidocaine (XYLOCAINE) 2 % solution 5 mL     lidocaine 1 % 0.1-1 mL     naloxone (NARCAN) 0.01 mg/mL in D5W 50 mL infusion     naloxone (NARCAN) injection 0.1-0.4 mg     omeprazole (priLOSEC) CR capsule 40 mg     sennosides (SENOKOT) tablet 1 tablet     sertraline (ZOLOFT) tablet 50 mg     sodium  chloride (PF) 0.9% PF flush 0.2-10 mL     sodium chloride (PF) 0.9% PF flush 10 mL     Medications related to this visit are as follows (with PRN use indicated from 08:00 yesterday morning to 08:00 this morning): n/a - just returned from PACU    Physical Examination  Sleeping soundly in bed, oxymask in place. Occasional snoring. Right arm in cast.  Deferred further exam    Laboratory/Imaging/Pathology  CHEMISTRY  AST   Date Value Ref Range Status   10/01/2019 47 (H) 0 - 45 U/L Final     ALT   Date Value Ref Range Status   10/01/2019 47 0 - 50 U/L Final     Creatinine   Date Value Ref Range Status   10/01/2019 0.61 0.52 - 1.04 mg/dL Final       Estimated Creatinine Clearance: 152.8 mL/min (based on SCr of 0.61 mg/dL).    HEMATOLOGY  Platelet Count   Date Value Ref Range Status   10/02/2019 231 150 - 450 10e9/L Final     WBC   Date Value Ref Range Status   10/02/2019 18.1 (H) 4.0 - 11.0 10e9/L Final     Hemoglobin   Date Value Ref Range Status   10/02/2019 11.2 (L) 11.7 - 15.7 g/dL Final     All other laboratory values, medical imaging and pathology reports acquired/resulted in the last 24 hours were reviewed.  Refer to the EMR for any details not referenced above.

## 2019-10-04 ENCOUNTER — APPOINTMENT (OUTPATIENT)
Dept: PHYSICAL THERAPY | Facility: CLINIC | Age: 19
DRG: 500 | End: 2019-10-04
Attending: PEDIATRICS
Payer: COMMERCIAL

## 2019-10-04 LAB — COPATH REPORT: NORMAL

## 2019-10-04 PROCEDURE — 97530 THERAPEUTIC ACTIVITIES: CPT | Mod: GP

## 2019-10-04 PROCEDURE — 25000128 H RX IP 250 OP 636: Performed by: STUDENT IN AN ORGANIZED HEALTH CARE EDUCATION/TRAINING PROGRAM

## 2019-10-04 PROCEDURE — 25000125 ZZHC RX 250: Performed by: STUDENT IN AN ORGANIZED HEALTH CARE EDUCATION/TRAINING PROGRAM

## 2019-10-04 PROCEDURE — 99233 SBSQ HOSP IP/OBS HIGH 50: CPT | Performed by: NURSE PRACTITIONER

## 2019-10-04 PROCEDURE — 25800025 ZZH RX 258: Performed by: STUDENT IN AN ORGANIZED HEALTH CARE EDUCATION/TRAINING PROGRAM

## 2019-10-04 PROCEDURE — 12000014 ZZH R&B PEDS UMMC

## 2019-10-04 PROCEDURE — 25000132 ZZH RX MED GY IP 250 OP 250 PS 637: Performed by: STUDENT IN AN ORGANIZED HEALTH CARE EDUCATION/TRAINING PROGRAM

## 2019-10-04 PROCEDURE — 25000131 ZZH RX MED GY IP 250 OP 636 PS 637: Performed by: STUDENT IN AN ORGANIZED HEALTH CARE EDUCATION/TRAINING PROGRAM

## 2019-10-04 RX ORDER — ACETAMINOPHEN 325 MG/1
975 TABLET ORAL EVERY 6 HOURS
Status: CANCELLED | OUTPATIENT
Start: 2019-10-04

## 2019-10-04 RX ORDER — POLYETHYLENE GLYCOL 3350 17 G/17G
17 POWDER, FOR SOLUTION ORAL DAILY
Status: DISCONTINUED | OUTPATIENT
Start: 2019-10-04 | End: 2019-10-05

## 2019-10-04 RX ORDER — IBUPROFEN 200 MG
600 TABLET ORAL EVERY 6 HOURS
Status: COMPLETED | OUTPATIENT
Start: 2019-10-04 | End: 2019-10-05

## 2019-10-04 RX ORDER — FAMOTIDINE 20 MG/1
20 TABLET, FILM COATED ORAL 2 TIMES DAILY
Status: DISCONTINUED | OUTPATIENT
Start: 2019-10-04 | End: 2019-10-13 | Stop reason: HOSPADM

## 2019-10-04 RX ORDER — OXYCODONE HYDROCHLORIDE 5 MG/1
10 TABLET ORAL EVERY 6 HOURS
Status: DISCONTINUED | OUTPATIENT
Start: 2019-10-04 | End: 2019-10-07

## 2019-10-04 RX ORDER — ACETAMINOPHEN 325 MG/1
975 TABLET ORAL EVERY 6 HOURS
Status: DISCONTINUED | OUTPATIENT
Start: 2019-10-04 | End: 2019-10-05

## 2019-10-04 RX ADMIN — Medication: at 03:02

## 2019-10-04 RX ADMIN — GABAPENTIN 600 MG: 300 CAPSULE ORAL at 09:42

## 2019-10-04 RX ADMIN — IBUPROFEN 600 MG: 200 TABLET, FILM COATED ORAL at 17:33

## 2019-10-04 RX ADMIN — NALOXONE HYDROCHLORIDE 0.5 MCG/KG/HR: 0.4 INJECTION, SOLUTION INTRAMUSCULAR; INTRAVENOUS; SUBCUTANEOUS at 08:08

## 2019-10-04 RX ADMIN — DIAZEPAM 2.5 MG: 5 INJECTION, SOLUTION INTRAMUSCULAR; INTRAVENOUS at 09:41

## 2019-10-04 RX ADMIN — POLYETHYLENE GLYCOL 3350 17 G: 17 POWDER, FOR SOLUTION ORAL at 09:46

## 2019-10-04 RX ADMIN — NALOXONE HYDROCHLORIDE 0.5 MCG/KG/HR: 0.4 INJECTION, SOLUTION INTRAMUSCULAR; INTRAVENOUS; SUBCUTANEOUS at 21:56

## 2019-10-04 RX ADMIN — IBUPROFEN 600 MG: 200 TABLET, FILM COATED ORAL at 12:16

## 2019-10-04 RX ADMIN — KETOROLAC TROMETHAMINE 30 MG: 30 INJECTION, SOLUTION INTRAMUSCULAR at 06:43

## 2019-10-04 RX ADMIN — ASPIRIN 81 MG: 81 TABLET ORAL at 07:49

## 2019-10-04 RX ADMIN — GABAPENTIN 600 MG: 300 CAPSULE ORAL at 15:00

## 2019-10-04 RX ADMIN — FLUTICASONE FUROATE AND VILANTEROL TRIFENATATE 1 PUFF: 100; 25 POWDER RESPIRATORY (INHALATION) at 09:22

## 2019-10-04 RX ADMIN — Medication 2.5 MG: at 15:01

## 2019-10-04 RX ADMIN — SERTRALINE HYDROCHLORIDE 50 MG: 50 TABLET ORAL at 08:00

## 2019-10-04 RX ADMIN — ACETAMINOPHEN 1000 MG: 10 INJECTION, SOLUTION INTRAVENOUS at 04:20

## 2019-10-04 RX ADMIN — FAMOTIDINE 20 MG: 20 TABLET ORAL at 21:23

## 2019-10-04 RX ADMIN — GABAPENTIN 600 MG: 300 CAPSULE ORAL at 21:24

## 2019-10-04 RX ADMIN — HYDROXYZINE HYDROCHLORIDE 25 MG: 25 INJECTION, SOLUTION INTRAMUSCULAR at 22:57

## 2019-10-04 RX ADMIN — PREDNISONE 20 MG: 20 TABLET ORAL at 07:49

## 2019-10-04 RX ADMIN — KETOROLAC TROMETHAMINE 30 MG: 30 INJECTION, SOLUTION INTRAMUSCULAR at 01:02

## 2019-10-04 RX ADMIN — HYDROXYUREA 2000 MG: 500 CAPSULE ORAL at 07:50

## 2019-10-04 RX ADMIN — FAMOTIDINE 20 MG: 20 INJECTION, SOLUTION INTRAVENOUS at 04:38

## 2019-10-04 RX ADMIN — Medication 2.5 MG: at 21:24

## 2019-10-04 RX ADMIN — DIAZEPAM 2.5 MG: 5 INJECTION, SOLUTION INTRAMUSCULAR; INTRAVENOUS at 03:10

## 2019-10-04 RX ADMIN — ACETAMINOPHEN 975 MG: 325 TABLET, FILM COATED ORAL at 15:00

## 2019-10-04 RX ADMIN — OXYCODONE HYDROCHLORIDE 10 MG: 5 TABLET ORAL at 12:16

## 2019-10-04 RX ADMIN — OXYCODONE HYDROCHLORIDE 10 MG: 5 TABLET ORAL at 17:34

## 2019-10-04 RX ADMIN — LIDOCAINE HYDROCHLORIDE 5 ML: 20 SOLUTION ORAL; TOPICAL at 17:46

## 2019-10-04 RX ADMIN — DEXTROSE AND SODIUM CHLORIDE: 5; 450 INJECTION, SOLUTION INTRAVENOUS at 06:46

## 2019-10-04 RX ADMIN — OMEPRAZOLE 40 MG: 20 CAPSULE, DELAYED RELEASE ORAL at 07:49

## 2019-10-04 RX ADMIN — ACETAMINOPHEN 975 MG: 325 TABLET, FILM COATED ORAL at 21:23

## 2019-10-04 NOTE — PLAN OF CARE
PT Unit 5: Jennifer was seen by PT with session focused on continued progression of ambulation, fitting a sling, and providing activity plan. Pt tolerated session well ambulating 3 laps around the unit with assist at IV pole only. Pt safe to ambulate in the halls with assist at IV pole from family or staff. Will discharge from PT as all goals have been met.     Discharge Planner PT   Patient plan for discharge: TBD  Current status: Independent with bed mobility and in room mobility, safe with all mobility  Barriers to return to prior living situation: Medical POC  Recommendations for discharge: Home with assist  Rationale for recommendations: Plan for OP PT follow up once cast is removed and medical team deems pt appropriate for rehab of UE       Entered by: Vida Monreal 10/04/2019 4:48 PM     Vida Monreal, PT, [phone]

## 2019-10-04 NOTE — PROGRESS NOTES
CC: POD 2 s/p tonsillectomy    HPI:  Patient seen at bedside.  Complains of a sore throat that is about the same as yesterday.  She has been able to eat 2 bowls of oatmeal and a pudding cup.  Able to take sips of water and clear soda.      PE:  GEN: alert, awake  O/C: no evidence of bleeding.  Tonsillar fossae with white fribinous exudate present    A/P:  POD 2 s/p tonsillectomy    - patient's oral intake has improved.  Taking POs and able to keep them down.  Advised her that she would need to stay with foods like oatmeal and pudding for the first week after surgery.  In week 2, she can try a little more solid style foods.  As long as she can swallow it, I am okay with it.  Encouraged hydration as much as possible    - Throat appearance is as expected for this time frame    - Reassured the patient that her pain status is where I would expect to be at this timeframe.  Re-discussed that it would not be possible to completely eliminate her pain with pain medications.  Reassured her that I thought she was making good progress though.     - Patient appears less sleepy and more alert today.  Agree with direction of pain management.    - discharge medications: narcotics per pain management team; viscous lidocaine (or magic mouthwash) gargles 10 ml q 2 hours PRN, prednisone 20 mg QDay x 5 days total, tylenol PRN, ibuprofen PRN, senna for constipation.    - arranging for patient to have follow-up in ENT clinic on 9/24 at 9:00 ir 9:15am.    - From ENT perspective, she can be discharged on POD 3 if pain team and primary team feels comfortable

## 2019-10-04 NOTE — PROGRESS NOTES
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 Pain is currently localized to both her operative arm and her throat. Despite high pain ratings on NRS, patient has been reported as calm and cooperative when awake which is different than her reported typical demeanor with poor pain control (self-described as angry and agitated). Continuing to work to find balance between adequate pain control and sedation. Ready to begin transition to oral analgesic regimen.     Diagnosis  Types and sources of pain:  - Nociceptive: acute post operative pain secondary to above procedures  - Neuropathic: nerve involvement in surgery, potential exists  - Psycho-social-spiritual-emotional: fear of stigma and worry around being identified as \"drug-seeking\" in an unfamiliar facility compounds anxiety, which may negatively impact pain  - Chronic: history of multiple sickle cell crises per year; chronic back pain, improving after breast reduction late spring/early summer  Advance care planning:   - Not " "appropriate to address at this visit. Assessments will be ongoing.  Discharge planning: TBD    Recommendations  - Begin transition off IV medications as below  -alternate oxycodone and diazepam so they are 3 hours apart from one another    POSTOPERATIVE PAIN MANAGEMENT AND RECOMMENDATIONS:   Simple Analgesia:  - Acetaminophen 15 mg/kg po Q6h scheduled. Continue for the first 72 hours  - NSAID: ketorolac 30 mg IV Q6h scheduled (patient received one dose intraoperatively)  - alternatively, team may consider  celecoxib 200 mg po once daily (previously on 100 mg po twice daily, changed to one dose due to T&A and anticipated difficulty swallowing) vs ibuprofen 600mg po Q6h     Opioid Analgesia:(IV hydromorphone PCA initially per recommendations from her hematologist, appropriate given extremity surgery combined with tonsillectomy; initial dosing based on review of prior records for admission for sickle cell crises)  - this morning, start oxycodone 10 mg po Q6h scheduled and stop IV PCA infusion rate, leave PCA doses available. Consider liquid over pill formulation, depending on ability to swallow   - hydromorphone IV PCA as follows:   PCA dose: 0.2 mg  Lockout interval: 10 minutes  Continuous rate: 0 mg/hr  One hour dose limit: 1.2 mg (i.e. 4 boluses allowed in 1 hour)     - tomorrow, plan to transition off PCA to either oxycodone 10 mg po Q4h PRN OR 10 mg po Q6h scheduled plus Q6h PRN (give scheduled and PRN at least 2 hours apart)    ADJUVANT ANALGESIA  - Resume home gabapentin, 600 mg TID as soon as she is able to have oral medications. Capsules may be opened and sprinkled on sherbet or other approved food  - Diazepam 2.5 mg po Q6h scheduled + Q6h PRN muscle spasms  - hydroxyzine 25 mg IV/po Q6h PRN pain as an opioid-sparing adjuvant  - decadron per ENT  - lidocaine swish/gargle per ENT     ADVERSE REACTION MANAGEMENT  Constipation: PLEASE SCHEDULE as soon as able to take oral medications:  \"Mush\": Start polyethylene " "glycol 3350, 17g daily  \"Push\": Start senna, 5 mls or 1 tab HS  \"Uncorking\": Consider dulcolax suppository or Fleet enema if no stool in 24 hours.  Pruritus: For opioid-induced pruritus, continue naloxone continuous infusion at 0.5 mcg/kg/hr.  Can titrate upwards (usually done in 0.5 mcg/kg/hr increments) till a maximum dose of 2 mcg/kg/hr is reached.  If pruritus is still a distressing symptom at maximum doses of naloxone, contact the PACCT provider on call for assistance with an opioid rotation.  Note that opioid-induced pruritus is NOT a histamine-mediated reaction, therefore antihistamines (such as diphenhydramine/Benadryl ) are generally ineffective in resolving this symptom.  Nausea/Vomiting: recommend PRN ondansetron as first line treatment     Non-medication strategies:   - Child Life Specialist, mental health provider, and/or caregiver support, when appropriate and available   - provided patient with notepad and marker as a communication aid   - encourage cold soft foods for anesthetic benefit (ex: popsicles, sherbet, etc)   - Allow choices where permitted, positioning, rapport builiding   - Cognitive: auditory stimuli (e.g. tablets), control, controlled breathing, distraction, imagery, hypnosis (by trained provider only), modeling, relaxation, prepare for coping techniques and/or teaching procedures, relaxation   - Biophysical: environmental modification, holding, touching, massage, heat or cold application   - Distraction: music, TV, video games, guided imagery, deep breathing, conversation  Other considerations: Older patients may or may not want caregiver presence. Establish rapport to increase cooperation. Allow to watch procedure, if requested    The above recommendations are based on the WHO Guidelines for the Pharmacological Treatment of Persisting Pain in Children with Medical Illnesses: (1) using a two-step strategy, (2) dosing at regular intervals, (3) using the appropriate route of " "administration, and (4) adapting treatment to the individual child (available at: http://apps.who.int/iris/amarjittream/09448/67498/1/9789241548120_Guidelines.pdf).    Thank you for the opportunity to participate in the care of Jennifer and her family.  Please contact the Pain and Advanced/Complex Care Team (PACCT) with any emergent needs via text page to the PACCT general pager ([phone], answered 8-4:30 Monday to Friday).  After 4:30 pm and on weekends/holidays, please refer to the [facility] or [place] on-call schedule.    The above assessment and recommendations were discussed with Jennifer's care team.  All parties involved agree with the above recommendations.  A total of 35 minutes were spent face-to-face with and in the coordination of care of Jennifer Cervantes.  Greater than 50% of my time on the unit was spent counseling the patient and/or coordinating care.    Echo Iniguez, NP, APRN CNP   Pain and Advanced/Complex Care Team (PACCT)  [facility]  PACCT Pager: [phone]    SUBJECTIVE: Interim History  More wakeful today. Eating some. Reports that she agreed to decrease PCA earlier today out of fear of being thought of as a \"drug-seeker.\"    OBJECTIVE: Last 24 hours (from 08:00 yesterday morning to 08:00 this morning)  VITALS: Reviewed; all vital signs were within normal limits for age.  INS/OUTS:   Taking PO? no  Bowel movements? yes (Last documented bowel movement: 10/3, soft)  PAIN (0-10 Numeric Pain Rating Scale, with 10 being the worst pain imaginable): 10/10 at the time of my visit    Current Medications  I have reviewed this Jennifer's medication profile and medications during this hospitalization.    Current Facility-Administered Medications   Medication     acetaminophen (OFIRMEV) infusion 1,000 mg     albuterol (PROAIR HFA/PROVENTIL HFA/VENTOLIN HFA) 108 (90 Base) MCG/ACT inhaler 2 puff     aspirin EC tablet 81 mg     deferasirox (JADENU) tablet 900 mg - " PATIENT SUPPLIED MEDICATION     dextrose 5% and 0.45% NaCl infusion     diazepam (VALIUM) injection 2.5 mg     diazepam (VALIUM) injection 2.5 mg     EPINEPHrine (ADRENALIN) kit 0.3 mg     erythromycin ethylsuccinate (E.E.S.) tablet 400 mg     famotidine (PEPCID) infusion 20 mg     fluticasone-vilanterol (BREO ELLIPTA) 100-25 MCG/INH inhaler 1 puff     gabapentin (NEURONTIN) capsule 600 mg     heparin 100 UNIT/ML injection 5 mL     heparin lock flush 10 UNIT/ML injection 3-6 mL     heparin lock flush 10 UNIT/ML injection 3-6 mL     HYDROmorphone (DILAUDID) PCA 0.2 mg/mL OPIOID TOLERANT     hydroxyurea (HYDREA) capsule CHEMO 2,000 mg     hydrOXYzine (VISTARIL) injection PEDS/NICU 25 mg     ketorolac (TORADOL) injection 30 mg     lidocaine (LMX4) cream     lidocaine (XYLOCAINE) 2 % solution 5 mL     lidocaine 1 % 0.1-1 mL     naloxone (NARCAN) 0.01 mg/mL in D5W 50 mL infusion     naloxone (NARCAN) injection 0.1-0.4 mg     omeprazole (priLOSEC) CR capsule 40 mg     predniSONE (DELTASONE) tablet 20 mg     sennosides (SENOKOT) tablet 1 tablet     sertraline (ZOLOFT) tablet 50 mg     sodium chloride (PF) 0.9% PF flush 0.2-10 mL     sodium chloride (PF) 0.9% PF flush 10 mL     Medications related to this visit are as follows (with PRN use indicated from 08:00 yesterday morning to 08:00 this morning):     Hydromorphone PCA:    - hydromorphone continuous infusion + PCA:  PCA dose: 0.3 mg  Lockout interval: 10 minutes  Continuous rate: 0.3 mg  One hour dose limit: 1.5 (i.e. 4 boluses allowed in 1 hour)    Delivered Denied Amount (mg)   Day (15:19) 16 8 4.49*   Evening (19:52) 5 0 2.68   [place]/night (03:02) 6 8 3.5   Overnight (07:12) 2 0 1.67   24h Total 29 16 12.34   History (in mg, ending am of date below)         3-Oct 8.23     * question accuracy of this value - pump did not appear to have been cleared overnight on assessment in late morning    Physical Examination  In am, patient sleeping. Woke to voice, verbalized pain  and answered brief questions. Voice soft, muffled. In afternoon, up in cardona with PT  Unlabored respirations on room air. Occasional loose cough.  Right arm in cast. DUARTE    Laboratory/Imaging/Pathology  CHEMISTRY  AST   Date Value Ref Range Status   10/01/2019 47 (H) 0 - 45 U/L Final     ALT   Date Value Ref Range Status   10/01/2019 47 0 - 50 U/L Final     Creatinine   Date Value Ref Range Status   10/01/2019 0.61 0.52 - 1.04 mg/dL Final       Estimated Creatinine Clearance: 152.8 mL/min (based on SCr of 0.61 mg/dL).    HEMATOLOGY  Platelet Count   Date Value Ref Range Status   10/02/2019 231 150 - 450 10e9/L Final     WBC   Date Value Ref Range Status   10/02/2019 18.1 (H) 4.0 - 11.0 10e9/L Final     Hemoglobin   Date Value Ref Range Status   10/02/2019 11.2 (L) 11.7 - 15.7 g/dL Final     All other laboratory values, medical imaging and pathology reports acquired/resulted in the last 24 hours were reviewed.  Refer to the EMR for any details not referenced above.

## 2019-10-04 NOTE — PLAN OF CARE
Pt alert, VSS.  Pt tolerating soft foods well. Appetite good, liquid PO intake minimal.  Encouraged liquid intake.  Pt up ambulating in halls with PT.  Arm in sling when up out of bed.  CWMS intact right hand.  Pt continues to complain of lots of pain, rating at 9-10.  Service aware and working on titrating medication.  Will continue to monitor.

## 2019-10-04 NOTE — PROGRESS NOTES
[facility], [place]    Progress Note - Peds Heme/Onc Service        Date of Admission:  10/1/2019    Assessment & Plan   Jennifer Cervantes is a 19 year old female with HbSS disease on chronic transfusion program for secondary stroke prevention and complex PMH who is usually treated at [facility] and [facility] who was admitted 10/1 in preparation for for planned surgical procedures (10/2) with ENT -- Dr. Guy (tonsillectomy) and Ortho -- Dr. Franco (right elbow flexor pronator plasty, right elbow brachialis lengthening, right elbow biceps tenotomy, right thumb thenar release). Now POD#2 with need for IV hydration, close monitoring of hemoglobin, and pain control.      Hematology  # History of left MCA stroke  # Hypercoagulability  # History of TIA  - Continue home ASA 81 mg qDay     # HbSS  - Continue home deferasirox 900 mg daily once able to PO  - Continue home erythromycin 400 mg TID once able to PO  - Continue home Hydroxyurea 2000 mg daily once able to PO     Neuro  # Chronic pain  # Acute post-operative pain  - PACCT team consulted for guidance, appreciate recs  - PT consulted for early ambulation  - Transitioned basal pain control from IV hydromorphone to PO oxycodone equivalent dosage. Continue with PCA 0.2mg. One hour dose limit of 0.8mg  - Scheduled tylenol q6h IV  - Transitioned diazepam to oral 2.5 mg q6h for muscle spasms  - Narcan IV infusion for itching 2/2 to dilaudid.     ENT  # S/p bilateral tonsillectomy  - 10 mg decadron IV x3 doses, now complete. Will do 5 days of prednisolone taper.   - 2% viscous lidocaine rinses q2h prn  - Soft diet x 2 weeks  - ENT must be paged immediately for any spitting up or coughing of blood     Resp  - Continue home albuterol 2 puffs q4h prn   - On symbicort BID at home. Started on Breo Elipta 1 puff daily.  - PT consult placed and encouraged incentive spirometry  - O2 sats >94% on RA. O2 mask PRN for  desaturations.     FEN/GI  - D5 1/2 NS at 100 mL/hr  - Soft diet x 2 weeks     Access: port  Disposition: Pending post-operative recovery    Disposition Plan   Expected discharge: 2 - 3 days, recommended to prior living arrangement once adequate pain management/ tolerating PO medications.  Entered: Francesco [place] 10/04/2019, 12:26 PM    The patient's care was discussed with the Attending Physician, Dr. Alvarez, Bedside Nurse and Patient.    Francesco Child  Medical Student  Peds Heme/Onc Service  [facility]    Patient seen and discussed with the medical student. I agree with exam, assessment, and plan.    Physician Attestation   I, Pedro Alvarez, was present with the medical student who participated in the service and in the documentation of the note.  I have verified the history and personally performed the physical exam and medical decision making.  I agree with the assessment and plan of care as documented in the note.      I personally reviewed vital signs, medications and labs.    I agree with the assessment as noted.    Pedro Alvarez MD  Date of Service (when I saw the patient): 10/4/19    __________________________________________________________    Interval History   No acute events overnight and no desaturations. Continues to have significant pain localized to RUE and oropharynx. Speaking is significantly improved from yesterday. Tolerating soft diet of oatmeal and noodles. Transitioned to oral diazepam. Transitioned basal dilaudid to PO oxycodone. Continue PCA dilaudid bumps 0.2, 1 hour max of 0.8mg. Patient encouraged to use incentive spirometry and increase activity as able.    Data reviewed today: I reviewed all medications, new labs and imaging results over the last 24 hours. I personally reviewed no images or EKG's today.    Physical Exam   Vital Signs: Temp: 98.5  F (36.9  C) Temp src: Oral BP: 105/63 Pulse: 74 Heart Rate:  64 Resp: 18 SpO2: 97 % O2 Device: None (Room air)    Weight: 145 lbs 1 oz  Constitutional: sleepy, easily arousable, cooperative, no apparent distress.   Eyes: EOMs grossly intact, sclera clear, conjunctiva normal  ENT: Normocephalic, without obvious abnormality, atraumatic, sinuses nontender on palpation, oral pharynx with moist mucous membranes. No oral bleeding.   Hematologic / Lymphatic: no cervical lymphadenopathy and no supraclavicular lymphadenopathy  Respiratory: No increased work of breathing, good air exchange, clear to auscultation bilaterally, no crackles or wheezing  Cardiovascular: normal S1 and S2 and no edema  GI: active bowel sounds, non-distended, non-tender and no hepatosplenomegally  Neurologic: Awake, alert, oriented to name, place and time. Sensation intact distally in upper and lower extremities.    Data   Recent Labs   Lab 10/02/19  2004 10/01/19  1550   WBC 18.1* 13.2*   HGB 11.2* 11.8   MCV 88 87    205   NA  --  140   POTASSIUM  --  3.7   CHLORIDE  --  111*   CO2  --  24   BUN  --  9   CR  --  0.61   ANIONGAP  --  5   MICAH  --  7.7*   GLC  --  99   ALBUMIN  --  3.7   PROTTOTAL  --  7.5   BILITOTAL  --  1.7*   ALKPHOS  --  66   ALT  --  47   AST  --  47*     Medications     dextrose 5% and 0.45% NaCl 100 mL/hr at 10/04/19 0646     HYDROmorphone       naloxone (NARCAN) infusion PEDS LESS than 45 kg 0.5 mcg/kg/hr (10/04/19 0808)       acetaminophen  975 mg Oral Q6H     aspirin  81 mg Oral Daily     deferasirox  900 mg Oral Daily     diazepam  2.5 mg Oral Q6H     erythromycin ethylsuccinate  400 mg Oral TID w/meals     famotidine  20 mg Intravenous Q12H     fluticasone-vilanterol  1 puff Inhalation Daily     gabapentin  600 mg Oral TID     heparin  5 mL Intracatheter Q28 Days     heparin lock flush  3-6 mL Intracatheter Q24H     hydroxyurea  2,000 mg Oral Daily     ibuprofen  600 mg Oral Q6H     omeprazole  40 mg Oral Daily     oxyCODONE  10 mg Oral Q6H     polyethylene glycol  17 g Oral  Daily     predniSONE  20 mg Oral Daily     sertraline  50 mg Oral Daily     sodium chloride (PF)  10 mL Intracatheter Q28 Days

## 2019-10-04 NOTE — PLAN OF CARE
Afebrile, VSS.  Pt. Still reporting pain 8-10/10.  No PRNs needed overnight.  Good UOP.  Appetite increasing; eating multiple bowls of oatmeal.  Did get pt. To take prednisone by crushing it over oatmeal.  No other oral meds given.  Got a bed bath on eves. Continue to monitor.  Notify team of any changes or concerns.

## 2019-10-04 NOTE — PLAN OF CARE
VSS. Patient rating pain 9-10/10. Dilaudid rate and dose increased. Added scheduled valium on top of PRN dose. Vistaril x 1. Patient said that her pain was improved at one point, but still rated pain 10/10. Patient ate oatmeal and icecream. Good UOP. No stool output. Up and walking with PT. Patient refused all PO medications throughout the day due to pain, MD aware. No family at bedside this shift.

## 2019-10-05 LAB
BLD PROD TYP BPU: NORMAL
BLD PROD TYP BPU: NORMAL
BLD UNIT ID BPU: 0
BLD UNIT ID BPU: 0
BLOOD PRODUCT CODE: NORMAL
BLOOD PRODUCT CODE: NORMAL
BPU ID: NORMAL
BPU ID: NORMAL
TRANSFUSION STATUS PATIENT QL: NORMAL

## 2019-10-05 PROCEDURE — 25000132 ZZH RX MED GY IP 250 OP 250 PS 637: Performed by: STUDENT IN AN ORGANIZED HEALTH CARE EDUCATION/TRAINING PROGRAM

## 2019-10-05 PROCEDURE — 25000128 H RX IP 250 OP 636: Performed by: STUDENT IN AN ORGANIZED HEALTH CARE EDUCATION/TRAINING PROGRAM

## 2019-10-05 PROCEDURE — 12000014 ZZH R&B PEDS UMMC

## 2019-10-05 PROCEDURE — 25000131 ZZH RX MED GY IP 250 OP 636 PS 637: Performed by: STUDENT IN AN ORGANIZED HEALTH CARE EDUCATION/TRAINING PROGRAM

## 2019-10-05 PROCEDURE — 25800025 ZZH RX 258: Performed by: STUDENT IN AN ORGANIZED HEALTH CARE EDUCATION/TRAINING PROGRAM

## 2019-10-05 RX ORDER — CELECOXIB 200 MG/1
200 CAPSULE ORAL DAILY
Status: DISCONTINUED | OUTPATIENT
Start: 2019-10-06 | End: 2019-10-08

## 2019-10-05 RX ORDER — SENNOSIDES 8.6 MG
8.6 TABLET ORAL DAILY
Status: DISCONTINUED | OUTPATIENT
Start: 2019-10-06 | End: 2019-10-06

## 2019-10-05 RX ORDER — ACETAMINOPHEN 325 MG/1
650 TABLET ORAL EVERY 6 HOURS
Status: DISCONTINUED | OUTPATIENT
Start: 2019-10-05 | End: 2019-10-13 | Stop reason: HOSPADM

## 2019-10-05 RX ADMIN — IBUPROFEN 600 MG: 200 TABLET, FILM COATED ORAL at 00:01

## 2019-10-05 RX ADMIN — OXYCODONE HYDROCHLORIDE 10 MG: 5 TABLET ORAL at 05:57

## 2019-10-05 RX ADMIN — IBUPROFEN 600 MG: 200 TABLET, FILM COATED ORAL at 05:57

## 2019-10-05 RX ADMIN — OXYCODONE HYDROCHLORIDE 10 MG: 5 TABLET ORAL at 11:47

## 2019-10-05 RX ADMIN — NALOXONE HYDROCHLORIDE 0.5 MCG/KG/HR: 0.4 INJECTION, SOLUTION INTRAMUSCULAR; INTRAVENOUS; SUBCUTANEOUS at 14:50

## 2019-10-05 RX ADMIN — Medication 2.5 MG: at 03:02

## 2019-10-05 RX ADMIN — PREDNISONE 20 MG: 20 TABLET ORAL at 08:48

## 2019-10-05 RX ADMIN — IBUPROFEN 600 MG: 200 TABLET, FILM COATED ORAL at 17:48

## 2019-10-05 RX ADMIN — GABAPENTIN 600 MG: 300 CAPSULE ORAL at 08:49

## 2019-10-05 RX ADMIN — IBUPROFEN 600 MG: 200 TABLET, FILM COATED ORAL at 11:47

## 2019-10-05 RX ADMIN — IBUPROFEN 600 MG: 200 TABLET, FILM COATED ORAL at 23:47

## 2019-10-05 RX ADMIN — OXYCODONE HYDROCHLORIDE 10 MG: 5 TABLET ORAL at 17:48

## 2019-10-05 RX ADMIN — ASPIRIN 81 MG: 81 TABLET ORAL at 08:48

## 2019-10-05 RX ADMIN — Medication 2.5 MG: at 08:49

## 2019-10-05 RX ADMIN — DEXTROSE AND SODIUM CHLORIDE: 5; 450 INJECTION, SOLUTION INTRAVENOUS at 21:59

## 2019-10-05 RX ADMIN — Medication 2.5 MG: at 16:03

## 2019-10-05 RX ADMIN — FLUTICASONE FUROATE AND VILANTEROL TRIFENATATE 1 PUFF: 100; 25 POWDER RESPIRATORY (INHALATION) at 09:03

## 2019-10-05 RX ADMIN — GABAPENTIN 600 MG: 300 CAPSULE ORAL at 16:04

## 2019-10-05 RX ADMIN — OXYCODONE HYDROCHLORIDE 10 MG: 5 TABLET ORAL at 23:47

## 2019-10-05 RX ADMIN — DEXTROSE AND SODIUM CHLORIDE: 5; 450 INJECTION, SOLUTION INTRAVENOUS at 02:16

## 2019-10-05 RX ADMIN — SERTRALINE HYDROCHLORIDE 50 MG: 50 TABLET ORAL at 08:48

## 2019-10-05 RX ADMIN — FAMOTIDINE 20 MG: 20 TABLET ORAL at 08:48

## 2019-10-05 RX ADMIN — Medication 2.5 MG: at 20:50

## 2019-10-05 RX ADMIN — ACETAMINOPHEN 975 MG: 325 TABLET, FILM COATED ORAL at 08:48

## 2019-10-05 RX ADMIN — DEXTROSE AND SODIUM CHLORIDE: 5; 450 INJECTION, SOLUTION INTRAVENOUS at 11:59

## 2019-10-05 RX ADMIN — FAMOTIDINE 20 MG: 20 TABLET ORAL at 20:51

## 2019-10-05 RX ADMIN — OMEPRAZOLE 40 MG: 20 CAPSULE, DELAYED RELEASE ORAL at 08:49

## 2019-10-05 RX ADMIN — ACETAMINOPHEN 975 MG: 325 TABLET, FILM COATED ORAL at 03:02

## 2019-10-05 RX ADMIN — OXYCODONE HYDROCHLORIDE 10 MG: 5 TABLET ORAL at 00:01

## 2019-10-05 RX ADMIN — ACETAMINOPHEN 650 MG: 325 TABLET, FILM COATED ORAL at 20:50

## 2019-10-05 RX ADMIN — GABAPENTIN 600 MG: 300 CAPSULE ORAL at 20:51

## 2019-10-05 RX ADMIN — HYDROXYUREA 2000 MG: 500 CAPSULE ORAL at 08:59

## 2019-10-05 NOTE — PLAN OF CARE
AVSS. Rating right arm and throat pain 9-10/10. Pt pain appears to be controlled with scheduled medications and Dilaudid PCA. Pt appears to be sleepy and is slow to answer questions. Vistaril given x 1 for itching under right arm cast with relief. Incontinent x 1 large void. Good PO intake of soft foods- 2 servings of ramen noodles and 6 vanilla puddings. Appeared to sleep between cares. Continue with current plan of care and notify MD of any changes or concerns.

## 2019-10-05 NOTE — PLAN OF CARE
"Pt somnolent, VSS.  RR 16-20, oxygen sats 98%.  Observed patient to sleep in between nurse visits to the room.  No facial grimacing, elevated HR, moans of pain observed or witnessed.  Upon waking, patient remains somnolent and describes pain as a 10 when asked, stating she doesn't feel good and \"it's out of control\".  Pt concerns discussed with hemonc team, waiting for further direction.  Pt refused to get out of bed or washed up, didn't want to go to chair, stating she didn't feel good.  Got up to Bedside commode only to void with encouragement.  Taking good po intake. Will continue to monitor.  MMK  "

## 2019-10-05 NOTE — PLAN OF CARE
Pt more alert after 1400.  Talking more and tolerating po intake.  Continues to rate pain a 10, pain present in right arm and in throat.  Service aware.  CWMS fingers on right hand WNL.  Right Arm up and elevated on pillows while in bed, in sling when in chair.  Pt did get up to chair x1, walked to chair with standby assist.  Pt tolerating po well.  Will continue to monitor. MMK

## 2019-10-05 NOTE — PLAN OF CARE
Physical Therapy Discharge Summary    Reason for therapy discharge:    All goals and outcomes met, no further needs identified.    Progress towards therapy goal(s). See goals on Care Plan in [facility] electronic health record for goal details.  Goals met    Therapy recommendation(s):    Continued therapy is recommended.  Rationale/Recommendations:  MD requesting OP PT following cast removal, no immediate OP PT needs.    Vida Monreal, PT, [phone]

## 2019-10-05 NOTE — PROGRESS NOTES
10/03/19 1854   Functional Level Prior   Usual Activity Tolerance good   Current Activity Tolerance poor   General Information   Onset of Illness/Injury or Date of Surgery - Date 10/01/19   Patient/Family Goals  return home with independent mobility   Pertinent History of Current Problem (include personal factors and/or comorbidities that impact the POC) Jennifer Cervantes is a 19 year old female with HbSS disease on chronic transfusion program for secondary stroke prevention and complex PMH who is usually treated at [facility] who was admitted 10/1 in preparation for for planned surgical procedures (10/2) with ENT -- Dr. Guy (tonsillectomy) and Ortho -- Dr. Franco (right elbow flexor pronator plasty, right elbow brachialis lengthening, right elbow biceps tenotomy, right thumb thenar release). Now POD#1 with need for IV hydration, close monitoring of hemoglobin, and pain control.    Parent/Caregiver Involvement Other (Comment)  (Not present)   Weight-Bearing Status - RUE nonweight-bearing   Pain Assessment   Patient Currently in Pain Yes, see Vital Sign flowsheet   Cognitive Status Examination   Orientation person   Level of Consciousness lethargic/somnolent;confused   Follows Commands and Answers Questions 50% of the time   Personal Safety and Judgment at risk behaviors demonstrated;impulsive   Memory impaired   Behavior   Behavior cooperative   Range of Motion (ROM)   Range of Motion Range of Motion is limited   Cervical Range of Motion  WFL   Trunk Range of Motion  WFL   Upper Extremity Range of Motion  R UE casted into slight extension   Lower Extremity Range of Motion  hypermobile ankles/feet with excessive pronation   Strength   Manual Muscle Testing Results Strength deficits identified   Cervical Strength  WFL   Trunk Strength  WFL   Upper Extremity Strength  R UE decreased secondary to pain and casting   Lower Extremity Strength  LE fatigue quickly   Muscle Tone Assessment    Muscle Tone  Right upper extremity tone abnormal;Right lower extremity tone abnormal;Left lower extremity tone abnormal   Transfer Skills and Mobility   Bed Mobility Comments SBA supine<>sit EOB   Functional Motor Performance Gross Motor Skills   Coordination Deficits Identified   Functional Motor Performance-Higher Level Motor Skills   Higher Level Gross Motor Skill Comments Not appropriate to assess   Gait   Gait Comments CGA to close SBA   Balance   Balance Comments Impaired in standing, sitting balance good   General Therapy Interventions   Planned Therapy Interventions Therapeutic Activities   Clinical Impression   Criteria for Skilled Interventions Met (PT) yes;meets criteria;skilled treatment is necessary   PT Diagnosis (PT) Impaired safety with transfers and ambulation   Functional limitations due to impairments impaired mobility;pain   Clinical Presentation Evolving/Changing   Clinical Presentation Rationale Greater than 3 body structural/functional impairments requiring moderately complex decision making to treat   Clinical Decision Making (Complexity) Moderate complexity   Therapy Frequency Daily   Predicted Duration of Therapy Intervention (PT) 2-3 sessions   Anticipated Discharge Disposition home w/ assist;home w/ outpatient services   Risk & Benefits of therapy have been explained Yes   Patient, Family & other staff in agreement with plan of care Yes   Clinical Impression Comments Jennifer Cervantes is a 18yo s/p R UE surgery who will benefit from skilled PT with session focused on progression of safety with transfers and ambulation and improving pain in R UE with positioning education.   Total Evaluation Time   Total Evaluation Time (Minutes) 8

## 2019-10-05 NOTE — PROGRESS NOTES
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 Guy (tonsillectomy) and Ortho -- Dr. Franco (right elbow flexor pronator plasty, right elbow brachialis lengthening, right elbow biceps tenotomy, right thumb thenar release). Now POD#3 with need for IV hydration, close monitoring of hemoglobin, and pain control.     Changes Today:  - ibuprofen to end tonight. Start Celebrex Qdaily tomorrow AM.   - Tylenol decreased to 650 mg Q6.     Hematology  History of left MCA stroke  Hypercoagulability  History of TIA  - Continue home ASA 81 mg qDay     HbSS  - Continue home deferasirox 900 mg daily (if family brings from home)  - Continue home erythromycin 400 mg TID  - Continue home hydroxyurea 2000 mg daily     Neuro  Chronic pain  Acute post-operative pain  - PACCT team consulted for dilaudid PCA guidance, appreciate recs  - PT consulted for early ambulation  - Hydromorphone PCA at basal rate 0, bumps 0.2, 1 hour max of 0.8 mg -> Will consider discontinuing PCA and replacing with oxy PRN  - Oxycodone 10mg PO Q6H  - Scheduled tylenol q6h PO  - Scheduled ibuprofen q6h  - Diazepam oral 2.5 mg q6h and PRN for muscle spasms  - Narcan IV infusion for itching 2/2 to dilaudid.  - Continue gabapentin 600mg TID  - Continue Zoloft 50mg daily    ENT  S/p bilateral tonsillectomy  - 10 mg decadron IV x3 doses, now complete. Will do 5 days of prednisolone taper  - 2% viscous lidocaine rinses q2h prn  - Soft diet x 2 weeks  - ENT must be paged immediately for any spitting up or coughing of blood     Resp  - Continue home albuterol 2 puffs q4h  prn   - On symbicort BID at home. Started on Breo Elipta 1 puff daily.  - PT consult placed and encouraged incentive spirometry  - O2 sats >94% on RA. O2 mask PRN for desaturations.     FEN/GI  - D5 1/2 NS at 100 mL/hr  - Soft diet x 2 weeks  - Famotidine 20mg BID  - Miralax 17g daily  - Senokot 8.6mg daily     Access: Port  Disposition: Pending post-operative recovery    Disposition Plan   Expected discharge: 2 - 3 days, recommended to prior living arrangement once adequate pain management/ tolerating PO medications.    The patient's care was discussed with the Attending Physician, Dr. Alvarez, Bedside Nurse and Patient.    Latonya Gonsalves MD PGY2    Physician Attestation   I, Pedro Alvarez MD, saw this patient with the resident and agree with the resident/fellow's findings and plan of care as documented in the note.      I personally reviewed vital signs, medications and labs.    Key findings: I agree with the assessment as noted.    Pedro Alvarez MD  Date of Service (when I saw the patient): 10/5/19        Interval History   No acute events overnight and no desaturations. Describes 8-10/10 pain in the right arm, although throat pain has improved and is only intermittent today on current pain regimen. Received 3 PRN Dilaudid bumps overnight with 5-6 button requests. Also received Vistaril x 1 for pain/itching. Itching attributed to opioids and remains under fair control this morning. Oral intake slowly improving. Urine output fair. One BM yesterday, none yet today.     Data reviewed today: I reviewed all medications, new labs and imaging results over the last 24 hours. I personally reviewed no images or EKG's today.    Physical Exam   Vital Signs: Temp: 98.5  F (36.9  C) Temp src: Axillary BP: 119/72 Pulse: 71 Heart Rate: 85 Resp: 16 SpO2: 99 % O2 Device: None (Room air)    Weight: 154 lbs 15.73 oz     Constitutional: Appears sleepy, however is sitting upright in bed and answering  questions appropriately, cooperative, no apparent distress.   Eyes: EOMs grossly intact, sclera clear, conjunctiva normal  ENT: Normocephalic, without obvious abnormality, atraumatic, oral pharynx, granulation tissue, moist mucous membranes. No active bleeding.   Lymphatic: No significant cervical or supraclavicular lymphadenopathy.  Respiratory: Breathing comfortably, fair aeration, clear to auscultation bilaterally, no crackles or wheezing,  Cardiovascular: Regular rate and rhythm, normal S1 and S2, left radial pulse normal, no significant edema  GI: Low to normal active bowel sounds, non-distended, non-tender, no hepatosplenomegaly  Extremities: Right arm immobilized in slight flexion  Neurologic: Awake, alert, oriented, cranial nerves grossly intact, neurovascularly intact    Data   Recent Labs   Lab 10/02/19  2004 10/01/19  1550   WBC 18.1* 13.2*   HGB 11.2* 11.8   MCV 88 87    205   NA  --  140   POTASSIUM  --  3.7   CHLORIDE  --  111*   CO2  --  24   BUN  --  9   CR  --  0.61   ANIONGAP  --  5   MICAH  --  7.7*   GLC  --  99   ALBUMIN  --  3.7   PROTTOTAL  --  7.5   BILITOTAL  --  1.7*   ALKPHOS  --  66   ALT  --  47   AST  --  47*     Medications     dextrose 5% and 0.45% NaCl 100 mL/hr at 10/05/19 1159     HYDROmorphone       naloxone (NARCAN) infusion PEDS LESS than 45 kg 0.5 mcg/kg/hr (10/05/19 0748)       acetaminophen  975 mg Oral Q6H     aspirin  81 mg Oral Daily     deferasirox  900 mg Oral Daily     diazepam  2.5 mg Oral Q6H     erythromycin ethylsuccinate  400 mg Oral TID w/meals     famotidine  20 mg Oral BID     fluticasone-vilanterol  1 puff Inhalation Daily     gabapentin  600 mg Oral TID     heparin  5 mL Intracatheter Q28 Days     heparin lock flush  3-6 mL Intracatheter Q24H     hydroxyurea  2,000 mg Oral Daily     ibuprofen  600 mg Oral Q6H     omeprazole  40 mg Oral Daily     oxyCODONE  10 mg Oral Q6H     predniSONE  20 mg Oral Daily     [START ON 10/6/2019] sennosides  8.6 mg Oral  Daily     sertraline  50 mg Oral Daily     sodium chloride (PF)  10 mL Intracatheter Q28 Days

## 2019-10-06 PROCEDURE — 12000014 ZZH R&B PEDS UMMC

## 2019-10-06 PROCEDURE — 25000132 ZZH RX MED GY IP 250 OP 250 PS 637: Performed by: STUDENT IN AN ORGANIZED HEALTH CARE EDUCATION/TRAINING PROGRAM

## 2019-10-06 PROCEDURE — 25000131 ZZH RX MED GY IP 250 OP 636 PS 637: Performed by: STUDENT IN AN ORGANIZED HEALTH CARE EDUCATION/TRAINING PROGRAM

## 2019-10-06 PROCEDURE — 25000128 H RX IP 250 OP 636: Performed by: STUDENT IN AN ORGANIZED HEALTH CARE EDUCATION/TRAINING PROGRAM

## 2019-10-06 PROCEDURE — 25800025 ZZH RX 258: Performed by: STUDENT IN AN ORGANIZED HEALTH CARE EDUCATION/TRAINING PROGRAM

## 2019-10-06 PROCEDURE — 25000132 ZZH RX MED GY IP 250 OP 250 PS 637: Performed by: PEDIATRICS

## 2019-10-06 RX ORDER — ERYTHROMYCIN ETHYLSUCCINATE 400 MG/5ML
400 SUSPENSION ORAL
Status: DISCONTINUED | OUTPATIENT
Start: 2019-10-06 | End: 2019-10-13 | Stop reason: HOSPADM

## 2019-10-06 RX ORDER — SENNOSIDES 8.6 MG
8.6 TABLET ORAL 2 TIMES DAILY
Status: DISCONTINUED | OUTPATIENT
Start: 2019-10-06 | End: 2019-10-13 | Stop reason: HOSPADM

## 2019-10-06 RX ORDER — HYDROXYZINE HYDROCHLORIDE 25 MG/1
25 TABLET, FILM COATED ORAL EVERY 6 HOURS PRN
Status: DISCONTINUED | OUTPATIENT
Start: 2019-10-06 | End: 2019-10-13 | Stop reason: HOSPADM

## 2019-10-06 RX ADMIN — GABAPENTIN 600 MG: 300 CAPSULE ORAL at 14:17

## 2019-10-06 RX ADMIN — OMEPRAZOLE 40 MG: 20 CAPSULE, DELAYED RELEASE ORAL at 08:36

## 2019-10-06 RX ADMIN — FLUTICASONE FUROATE AND VILANTEROL TRIFENATATE 1 PUFF: 100; 25 POWDER RESPIRATORY (INHALATION) at 09:38

## 2019-10-06 RX ADMIN — Medication 2.5 MG: at 03:03

## 2019-10-06 RX ADMIN — DEXTROSE AND SODIUM CHLORIDE: 5; 450 INJECTION, SOLUTION INTRAVENOUS at 17:31

## 2019-10-06 RX ADMIN — NALOXONE HYDROCHLORIDE 0.5 MCG/KG/HR: 0.4 INJECTION, SOLUTION INTRAMUSCULAR; INTRAVENOUS; SUBCUTANEOUS at 06:01

## 2019-10-06 RX ADMIN — GABAPENTIN 600 MG: 300 CAPSULE ORAL at 08:36

## 2019-10-06 RX ADMIN — Medication: at 21:31

## 2019-10-06 RX ADMIN — DEXTROSE AND SODIUM CHLORIDE: 5; 450 INJECTION, SOLUTION INTRAVENOUS at 21:25

## 2019-10-06 RX ADMIN — SENNOSIDES 8.6 MG: 8.6 TABLET, FILM COATED ORAL at 21:01

## 2019-10-06 RX ADMIN — DEXTROSE AND SODIUM CHLORIDE: 5; 450 INJECTION, SOLUTION INTRAVENOUS at 07:55

## 2019-10-06 RX ADMIN — PREDNISONE 20 MG: 20 TABLET ORAL at 08:37

## 2019-10-06 RX ADMIN — ACETAMINOPHEN 650 MG: 325 TABLET, FILM COATED ORAL at 03:03

## 2019-10-06 RX ADMIN — ACETAMINOPHEN 650 MG: 325 TABLET, FILM COATED ORAL at 08:38

## 2019-10-06 RX ADMIN — CELECOXIB 200 MG: 200 CAPSULE ORAL at 08:36

## 2019-10-06 RX ADMIN — OXYCODONE HYDROCHLORIDE 10 MG: 5 TABLET ORAL at 11:48

## 2019-10-06 RX ADMIN — FAMOTIDINE 20 MG: 20 TABLET ORAL at 08:37

## 2019-10-06 RX ADMIN — OXYCODONE HYDROCHLORIDE 10 MG: 5 TABLET ORAL at 18:23

## 2019-10-06 RX ADMIN — ACETAMINOPHEN 650 MG: 325 TABLET, FILM COATED ORAL at 21:01

## 2019-10-06 RX ADMIN — SENNOSIDES 8.6 MG: 8.6 TABLET, FILM COATED ORAL at 08:37

## 2019-10-06 RX ADMIN — FAMOTIDINE 20 MG: 20 TABLET ORAL at 21:01

## 2019-10-06 RX ADMIN — SERTRALINE HYDROCHLORIDE 50 MG: 50 TABLET ORAL at 08:37

## 2019-10-06 RX ADMIN — OXYCODONE HYDROCHLORIDE 10 MG: 5 TABLET ORAL at 05:59

## 2019-10-06 RX ADMIN — ASPIRIN 81 MG: 81 TABLET ORAL at 08:37

## 2019-10-06 RX ADMIN — ACETAMINOPHEN 650 MG: 325 TABLET, FILM COATED ORAL at 15:03

## 2019-10-06 RX ADMIN — ERYTHROMYCIN ETHYLSUCCINATE 400 MG: 400 SUSPENSION ORAL at 18:23

## 2019-10-06 RX ADMIN — Medication 2.5 MG: at 15:03

## 2019-10-06 RX ADMIN — HYDROXYZINE HYDROCHLORIDE 25 MG: 25 INJECTION, SOLUTION INTRAMUSCULAR at 01:53

## 2019-10-06 RX ADMIN — GABAPENTIN 600 MG: 300 CAPSULE ORAL at 21:01

## 2019-10-06 RX ADMIN — Medication 2.5 MG: at 21:01

## 2019-10-06 RX ADMIN — HYDROXYUREA 2000 MG: 500 CAPSULE ORAL at 08:44

## 2019-10-06 RX ADMIN — OXYCODONE HYDROCHLORIDE 10 MG: 5 TABLET ORAL at 23:56

## 2019-10-06 RX ADMIN — HYDROXYZINE HYDROCHLORIDE 25 MG: 25 TABLET, FILM COATED ORAL at 18:28

## 2019-10-06 RX ADMIN — Medication 2.5 MG: at 08:37

## 2019-10-06 NOTE — PLAN OF CARE
"Pt alert, oriented.  VSS.  CWMS right extremity WNL.  Ortho team here today to evaluate cast and hand.  Ortho happy with the cast and patients CWMS.   Pt up to bathroom x2 with standby assist.  Continues to complain that pain is a 10/10, present in arm and throat and \"all over\".  Some objective moaning and groaning present this morning with movement of right arm.  Service aware of patient's pain and current expressed pain level.  [facility].  Will continue to monitor. MMK  "

## 2019-10-06 NOTE — PROGRESS NOTES
Orthopaedic Surgery Progress Note   10/06/2019    Subjective: Patient thinks cast fitting well. Denies new numbness, tingling, new motor dysfunction. Pain well controlled.     Objective: /70   Pulse 77   Temp 98.5  F (36.9  C) (Axillary)   Resp 20   Wt 70.3 kg (154 lb 15.7 oz)   LMP  (LMP Unknown)   SpO2 98%   BMI 26.17 kg/m      General: NAD, alert and oriented, cooperative with exam.   Cardio: RRR, extremities wwp.   Respiratory: Non-labored breathing.  MSK: Focused examination of     RUE reveals cast well fitting, SILT in m/r/u nerve dist. Extends digits, flexes/extends thumb weakly. Reports at her baseline. Fingers wwp.     Assessment and Plan: Jennifer Cervantes is a 19 year old female now s/p RUE lengthening procedures on 10/2 with Dr. Franco.     - keep cast c/d/i  - NWB RUE, ok for finger ROM   - f/u with Dr. Franco as previously scheduled on 10/24    Allison Enriquez MD  Orthopedic Surgery PGY-4  [phone]    For questions about this patient during weekday business hours, please attempt to contact me at my pager prior to contacting the Orthopaedic Surgery resident on call. On the weekends and overnight, please page the Orthopaedic Surgery resident on call. Thank you!

## 2019-10-06 NOTE — PLAN OF CARE
AVSS. Pt continues to rate right arm and throat pain 9-10/10. Encouraged pt to use PCA and repositioning with acute pain. CMS to upper right extremity intact. Pt c/o itching under her right arm cast. Vistaril given x 1 with no further c/o itching. Eating well but needs encouragement to drink fluids. Appears to be sleeping well between cares. Continue with current plan of care and notify MD of any changes or concerns.

## 2019-10-06 NOTE — PROGRESS NOTES
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 Guy (tonsillectomy) and Ortho -- Dr. Franco (right elbow flexor pronator plasty, right elbow brachialis lengthening, right elbow biceps tenotomy, right thumb thenar release). Now POD#4 with need for IV hydration and pain control.     Changes Today:  - ibuprofen discontinued. Start Celebrex Qdaily tomorrow AM.   - narcan gtt discontinued     Hematology  History of left MCA stroke  Hypercoagulability  History of TIA  - Continue home ASA 81 mg qDay     HbSS  - Continue home deferasirox 900 mg daily (if family brings from home)  - Continue home erythromycin 400 mg TID  - Continue home hydroxyurea 2000 mg daily     Neuro  Chronic pain  Acute post-operative pain  - PACCT team consulted for dilaudid PCA guidance, appreciate recs  - PT consulted for early ambulation  - Hydromorphone PCA at basal rate 0, bumps 0.2, 1 hour max of 0.8 mg -> Will consider discontinuing PCA and replacing with oxy PRN  - Oxycodone 10mg PO Q6H  - Scheduled tylenol q6h PO  - Scheduled ibuprofen q6h  - Diazepam oral 2.5 mg q6h and PRN for muscle spasms  - Narcan IV infusion for itching 2/2 to dilaudid.  - Continue gabapentin 600mg TID  - Continue Zoloft 50mg daily    ENT  S/p bilateral tonsillectomy  - 10 mg decadron IV x3 doses, now complete. Will do 5 days of prednisolone taper  - 2% viscous lidocaine rinses q2h prn  - Soft diet x 2 weeks  - ENT must be paged immediately for any spitting up or coughing of blood     Resp  - Continue home albuterol 2 puffs q4h prn   - On symbicort BID at home. Started on  Jayjay Haynes 1 puff daily.  - PT consult placed and encouraged incentive spirometry  - O2 sats >94% on RA. O2 mask PRN for desaturations.     FEN/GI  - D5 1/2 NS at 100 mL/hr  - Soft diet x 2 weeks  - Famotidine 20mg BID  - Miralax 17g daily  - Senokot 8.6mg daily     Access: Port  Disposition: Pending post-operative recovery    Disposition Plan   Expected discharge: 2 - 3 days, recommended to prior living arrangement once adequate pain management/ tolerating PO medications.    The patient's care was discussed with the Attending Physician, Dr. Alvarez, Bedside Nurse and Patient.    Tati Pope MD  Pediatric PGY3    Physician Attestation   I, Pedro Alvarez MD, saw this patient with the resident and agree with the resident/fellow's findings and plan of care as documented in the note.      I personally reviewed vital signs, medications and labs.    Key findings: I agree with the assessment as noted.    Pedro Alvarez MD  Date of Service (when I saw the patient): 10/6/19        Interval History   More awake yesterday afternoon/evening. Narcan drip unintentionally weight adjusted for 5kg weight difference and pain was still under good control. No acute events overnight and no desaturations. Describes 10/10 pain in the right arm, although throat pain has improved and is only intermittent today on current pain regimen. Prn vistaril x 1. Oral intake slowly improving. Urine output fair. One BM yesterday, none yet today.     Data reviewed today: I reviewed all medications, new labs and imaging results over the last 24 hours. I personally reviewed no images or EKG's today.    Physical Exam   Vital Signs: Temp: 98.2  F (36.8  C) Temp src: Oral BP: 124/73 Pulse: 77 Heart Rate: 80 Resp: 20 SpO2: 98 % O2 Device: None (Room air)    Weight: 154 lbs 15.73 oz     Constitutional: Appears sleepy, however is sitting upright in bed and answering questions appropriately, cooperative, no apparent distress.   Eyes:  EOMs grossly intact, sclera clear, conjunctiva normal  ENT: Normocephalic, without obvious abnormality, atraumatic, oral pharynx, granulation tissue, moist mucous membranes. No active bleeding.   Lymphatic: No significant cervical or supraclavicular lymphadenopathy.  Respiratory: Breathing comfortably, fair aeration, clear to auscultation bilaterally, no crackles or wheezing,  Cardiovascular: Regular rate and rhythm, normal S1 and S2, left radial pulse normal, no significant edema  GI: Low to normal active bowel sounds, non-distended, non-tender, no hepatosplenomegaly  Extremities: Right arm immobilized in slight flexion  Neurologic: Awake, alert, oriented, cranial nerves grossly intact, neurovascularly intact    Data   Recent Labs   Lab 10/02/19  2004 10/01/19  1550   WBC 18.1* 13.2*   HGB 11.2* 11.8   MCV 88 87    205   NA  --  140   POTASSIUM  --  3.7   CHLORIDE  --  111*   CO2  --  24   BUN  --  9   CR  --  0.61   ANIONGAP  --  5   MICAH  --  7.7*   GLC  --  99   ALBUMIN  --  3.7   PROTTOTAL  --  7.5   BILITOTAL  --  1.7*   ALKPHOS  --  66   ALT  --  47   AST  --  47*     Medications     dextrose 5% and 0.45% NaCl 100 mL/hr at 10/06/19 0755     HYDROmorphone         acetaminophen  650 mg Oral Q6H     aspirin  81 mg Oral Daily     celecoxib  200 mg Oral Daily     deferasirox  900 mg Oral Daily     diazepam  2.5 mg Oral Q6H     erythromycin ethylsuccinate  400 mg Oral TID w/meals     famotidine  20 mg Oral BID     fluticasone-vilanterol  1 puff Inhalation Daily     gabapentin  600 mg Oral TID     heparin  5 mL Intracatheter Q28 Days     heparin lock flush  3-6 mL Intracatheter Q24H     hydroxyurea  2,000 mg Oral Daily     omeprazole  40 mg Oral Daily     oxyCODONE  10 mg Oral Q6H     predniSONE  20 mg Oral Daily     sennosides  8.6 mg Oral BID     sertraline  50 mg Oral Daily     sodium chloride (PF)  10 mL Intracatheter Q28 Days

## 2019-10-07 ENCOUNTER — APPOINTMENT (OUTPATIENT)
Dept: GENERAL RADIOLOGY | Facility: CLINIC | Age: 19
DRG: 500 | End: 2019-10-07
Attending: PEDIATRICS
Payer: COMMERCIAL

## 2019-10-07 LAB
ANION GAP SERPL CALCULATED.3IONS-SCNC: 6 MMOL/L (ref 3–14)
BASOPHILS # BLD AUTO: 0.2 10E9/L (ref 0–0.2)
BASOPHILS NFR BLD AUTO: 0.9 %
BUN SERPL-MCNC: 6 MG/DL (ref 7–30)
CALCIUM SERPL-MCNC: 7.6 MG/DL (ref 8.5–10.1)
CHLORIDE SERPL-SCNC: 110 MMOL/L (ref 94–109)
CO2 SERPL-SCNC: 27 MMOL/L (ref 20–32)
CREAT SERPL-MCNC: 0.45 MG/DL (ref 0.52–1.04)
DIFFERENTIAL METHOD BLD: ABNORMAL
EOSINOPHIL # BLD AUTO: 0.4 10E9/L (ref 0–0.7)
EOSINOPHIL NFR BLD AUTO: 2.3 %
ERYTHROCYTE [DISTWIDTH] IN BLOOD BY AUTOMATED COUNT: 19 % (ref 10–15)
GFR SERPL CREATININE-BSD FRML MDRD: >90 ML/MIN/{1.73_M2}
GLUCOSE SERPL-MCNC: 93 MG/DL (ref 70–99)
HCT VFR BLD AUTO: 29.3 % (ref 35–47)
HGB BLD-MCNC: 9.4 G/DL (ref 11.7–15.7)
IMM GRANULOCYTES # BLD: 0.1 10E9/L (ref 0–0.4)
IMM GRANULOCYTES NFR BLD: 0.5 %
LYMPHOCYTES # BLD AUTO: 3.4 10E9/L (ref 0.8–5.3)
LYMPHOCYTES NFR BLD AUTO: 17.9 %
MCH RBC QN AUTO: 29.1 PG (ref 26.5–33)
MCHC RBC AUTO-ENTMCNC: 32.1 G/DL (ref 31.5–36.5)
MCV RBC AUTO: 91 FL (ref 78–100)
MONOCYTES # BLD AUTO: 1.2 10E9/L (ref 0–1.3)
MONOCYTES NFR BLD AUTO: 6.1 %
NEUTROPHILS # BLD AUTO: 13.9 10E9/L (ref 1.6–8.3)
NEUTROPHILS NFR BLD AUTO: 72.3 %
NRBC # BLD AUTO: 0.1 10*3/UL
NRBC BLD AUTO-RTO: 0 /100
PLATELET # BLD AUTO: 216 10E9/L (ref 150–450)
POTASSIUM SERPL-SCNC: 3.2 MMOL/L (ref 3.4–5.3)
RBC # BLD AUTO: 3.23 10E12/L (ref 3.8–5.2)
RETICS # AUTO: 69.4 10E9/L (ref 25–95)
RETICS/RBC NFR AUTO: 2.2 % (ref 0.5–2)
SODIUM SERPL-SCNC: 143 MMOL/L (ref 133–144)
WBC # BLD AUTO: 19.2 10E9/L (ref 4–11)

## 2019-10-07 PROCEDURE — 71045 X-RAY EXAM CHEST 1 VIEW: CPT

## 2019-10-07 PROCEDURE — 12000014 ZZH R&B PEDS UMMC

## 2019-10-07 PROCEDURE — 25000128 H RX IP 250 OP 636: Performed by: STUDENT IN AN ORGANIZED HEALTH CARE EDUCATION/TRAINING PROGRAM

## 2019-10-07 PROCEDURE — 25800030 ZZH RX IP 258 OP 636: Performed by: STUDENT IN AN ORGANIZED HEALTH CARE EDUCATION/TRAINING PROGRAM

## 2019-10-07 PROCEDURE — 87040 BLOOD CULTURE FOR BACTERIA: CPT | Performed by: STUDENT IN AN ORGANIZED HEALTH CARE EDUCATION/TRAINING PROGRAM

## 2019-10-07 PROCEDURE — 85045 AUTOMATED RETICULOCYTE COUNT: CPT | Performed by: STUDENT IN AN ORGANIZED HEALTH CARE EDUCATION/TRAINING PROGRAM

## 2019-10-07 PROCEDURE — 85025 COMPLETE CBC W/AUTO DIFF WBC: CPT | Performed by: STUDENT IN AN ORGANIZED HEALTH CARE EDUCATION/TRAINING PROGRAM

## 2019-10-07 PROCEDURE — 25800025 ZZH RX 258: Performed by: STUDENT IN AN ORGANIZED HEALTH CARE EDUCATION/TRAINING PROGRAM

## 2019-10-07 PROCEDURE — 25000131 ZZH RX MED GY IP 250 OP 636 PS 637: Performed by: STUDENT IN AN ORGANIZED HEALTH CARE EDUCATION/TRAINING PROGRAM

## 2019-10-07 PROCEDURE — 25000132 ZZH RX MED GY IP 250 OP 250 PS 637: Performed by: STUDENT IN AN ORGANIZED HEALTH CARE EDUCATION/TRAINING PROGRAM

## 2019-10-07 PROCEDURE — 80048 BASIC METABOLIC PNL TOTAL CA: CPT | Performed by: STUDENT IN AN ORGANIZED HEALTH CARE EDUCATION/TRAINING PROGRAM

## 2019-10-07 PROCEDURE — 36592 COLLECT BLOOD FROM PICC: CPT | Performed by: STUDENT IN AN ORGANIZED HEALTH CARE EDUCATION/TRAINING PROGRAM

## 2019-10-07 PROCEDURE — 25000132 ZZH RX MED GY IP 250 OP 250 PS 637: Performed by: PEDIATRICS

## 2019-10-07 RX ORDER — CEFTRIAXONE 2 G/1
2 INJECTION, POWDER, FOR SOLUTION INTRAMUSCULAR; INTRAVENOUS EVERY 24 HOURS
Status: DISCONTINUED | OUTPATIENT
Start: 2019-10-07 | End: 2019-10-11

## 2019-10-07 RX ORDER — OXYCODONE HYDROCHLORIDE 5 MG/1
10 TABLET ORAL EVERY 4 HOURS
Status: DISCONTINUED | OUTPATIENT
Start: 2019-10-07 | End: 2019-10-10

## 2019-10-07 RX ORDER — DIAZEPAM 2 MG
2 TABLET ORAL EVERY 8 HOURS
Status: DISCONTINUED | OUTPATIENT
Start: 2019-10-07 | End: 2019-10-08

## 2019-10-07 RX ORDER — DIAZEPAM 2 MG
2 TABLET ORAL EVERY 6 HOURS PRN
Status: DISCONTINUED | OUTPATIENT
Start: 2019-10-07 | End: 2019-10-13 | Stop reason: HOSPADM

## 2019-10-07 RX ADMIN — AZITHROMYCIN MONOHYDRATE 500 MG: 500 INJECTION, POWDER, LYOPHILIZED, FOR SOLUTION INTRAVENOUS at 15:35

## 2019-10-07 RX ADMIN — OMEPRAZOLE 40 MG: 20 CAPSULE, DELAYED RELEASE ORAL at 09:09

## 2019-10-07 RX ADMIN — CELECOXIB 200 MG: 200 CAPSULE ORAL at 09:10

## 2019-10-07 RX ADMIN — ERYTHROMYCIN ETHYLSUCCINATE 400 MG: 400 SUSPENSION ORAL at 13:52

## 2019-10-07 RX ADMIN — GABAPENTIN 600 MG: 300 CAPSULE ORAL at 14:50

## 2019-10-07 RX ADMIN — OXYCODONE HYDROCHLORIDE 10 MG: 5 TABLET ORAL at 22:03

## 2019-10-07 RX ADMIN — ACETAMINOPHEN 650 MG: 325 TABLET, FILM COATED ORAL at 09:09

## 2019-10-07 RX ADMIN — DIAZEPAM 2 MG: 2 TABLET ORAL at 18:27

## 2019-10-07 RX ADMIN — SENNOSIDES 8.6 MG: 8.6 TABLET, FILM COATED ORAL at 21:38

## 2019-10-07 RX ADMIN — ACETAMINOPHEN 650 MG: 325 TABLET, FILM COATED ORAL at 03:15

## 2019-10-07 RX ADMIN — SENNOSIDES 8.6 MG: 8.6 TABLET, FILM COATED ORAL at 09:09

## 2019-10-07 RX ADMIN — ASPIRIN 81 MG: 81 TABLET ORAL at 09:09

## 2019-10-07 RX ADMIN — DEXTROSE AND SODIUM CHLORIDE: 5; 450 INJECTION, SOLUTION INTRAVENOUS at 07:13

## 2019-10-07 RX ADMIN — POTASSIUM CHLORIDE: 2 INJECTION, SOLUTION, CONCENTRATE INTRAVENOUS at 13:06

## 2019-10-07 RX ADMIN — ACETAMINOPHEN 650 MG: 325 TABLET, FILM COATED ORAL at 21:38

## 2019-10-07 RX ADMIN — FAMOTIDINE 20 MG: 20 TABLET ORAL at 21:38

## 2019-10-07 RX ADMIN — SERTRALINE HYDROCHLORIDE 50 MG: 50 TABLET ORAL at 09:10

## 2019-10-07 RX ADMIN — PREDNISONE 20 MG: 20 TABLET ORAL at 09:10

## 2019-10-07 RX ADMIN — ERYTHROMYCIN ETHYLSUCCINATE 400 MG: 400 SUSPENSION ORAL at 18:26

## 2019-10-07 RX ADMIN — Medication 2.5 MG: at 03:15

## 2019-10-07 RX ADMIN — OXYCODONE HYDROCHLORIDE 10 MG: 5 TABLET ORAL at 18:27

## 2019-10-07 RX ADMIN — GABAPENTIN 600 MG: 300 CAPSULE ORAL at 09:09

## 2019-10-07 RX ADMIN — CEFTRIAXONE SODIUM 2 G: 2 INJECTION, POWDER, FOR SOLUTION INTRAMUSCULAR; INTRAVENOUS at 13:06

## 2019-10-07 RX ADMIN — ACETAMINOPHEN 650 MG: 325 TABLET, FILM COATED ORAL at 14:51

## 2019-10-07 RX ADMIN — Medication 2.5 MG: at 09:09

## 2019-10-07 RX ADMIN — FLUTICASONE FUROATE AND VILANTEROL TRIFENATATE 1 PUFF: 100; 25 POWDER RESPIRATORY (INHALATION) at 10:07

## 2019-10-07 RX ADMIN — OXYCODONE HYDROCHLORIDE 10 MG: 5 TABLET ORAL at 06:20

## 2019-10-07 RX ADMIN — OXYCODONE HYDROCHLORIDE 10 MG: 5 TABLET ORAL at 14:51

## 2019-10-07 RX ADMIN — FAMOTIDINE 20 MG: 20 TABLET ORAL at 09:10

## 2019-10-07 RX ADMIN — OXYCODONE HYDROCHLORIDE 10 MG: 5 TABLET ORAL at 11:38

## 2019-10-07 RX ADMIN — GABAPENTIN 600 MG: 300 CAPSULE ORAL at 21:38

## 2019-10-07 RX ADMIN — Medication 2000 MG: at 13:53

## 2019-10-07 RX ADMIN — ERYTHROMYCIN ETHYLSUCCINATE 400 MG: 400 SUSPENSION ORAL at 09:59

## 2019-10-07 NOTE — PLAN OF CARE
"AVSS. Pt rating pain 9-10/10. She reported that the pain is in her right arm and throat but also her back, sides, and at times \"everywhere\". Encouraged pt to use Dilaudid PCA, repositioning, and drinking water to help keep throat wet to help with the pain. Pt declined PRN valium and atarax when offered. She stated that the atarax did not help at all last evening. She appears to be having more discomfort as she is stating more frequently that she is in pain and occasionally moaning which she was not doing previously this weekend. Dr. Latonya Gonsalves notified and no changes to plan of care were made. Dr. Gonsalves said the PACCT team would be contacted for changes to pain plan this morning. Good CMS to right hand and cast does not appear to be too tight. Pt only had 2 oz of water to drink this shift. Eating pudding, oatmeal, and spaghetti-os this shift. Appeared to sleep between cares. Notify MD of any changes or concerns.   "

## 2019-10-07 NOTE — PROGRESS NOTES
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 Guy (tonsillectomy) and Ortho -- Dr. Franco (right elbow flexor pronator plasty, right elbow brachialis lengthening, right elbow biceps tenotomy, right thumb thenar release). Now POD#5 with new chest pain and infiltrates concerning for acute chest syndrome. Also still working on pain control and hydration.     Changes Today:  - increase oxycodone to 10 mg q4h  - decrease valium to 2 mg q8h  - continue current prns  - labs: CBC, blood culture, BMP, reticulocyte count  - CXR to eval for acute chest  - Start ceftriaxone and azithromycin     Hematology  History of left MCA stroke  Hypercoagulability  History of TIA  - Continue home ASA 81 mg qDay     HbSS  - Continue home deferasirox 900 mg daily (if family brings from home)  - Continue home erythromycin 400 mg TID  - Continue home hydroxyurea 2000 mg daily-- will make a liquid today  - AM CBC with retic     Neuro  Chronic pain  Acute post-operative pain  - PACCT team consulted for dilaudid PCA guidance, appreciate recs  - PT consulted for early ambulation  - Hydromorphone PCA at basal rate 0, bumps 0.2, 1 hour max of 0.8 mg  - Oxycodone 10mg PO Q4H  - Scheduled tylenol q6h PO  - Daily celebrex  - Diazepam oral 2 mg q8h and q6h PRN for muscle spasms  - Continue gabapentin 600mg TID  - Continue Zoloft 50mg daily    ENT  S/p bilateral tonsillectomy  - 10 mg decadron IV x3 doses, now complete. Will do 5 days of prednisolone taper  - 2% viscous lidocaine rinses q2h prn  - Soft diet x 2 weeks  - ENT must be paged immediately for any  spitting up or coughing of blood     Resp  Asthma  - Continue home albuterol 2 puffs q4h prn   - On symbicort BID at home. Started on Breo Elipta 1 puff daily.  - PT consult placed and encouraged incentive spirometry  - O2 sats >94% on RA. O2 mask PRN for desaturations.    Acute chest syndrome vs. Infection  - Ceftriaxone 2 g q24h  - Azithromycin 500 mg x 1 followed by 250 mg q24h for total of 5 doses     FEN/GI  - D5 1/2 NS + 20 KCl at 100 mL/hr  - Soft diet x 2 weeks  - Famotidine 20mg BID  - Miralax 17g daily  - Senokot 8.6mg daily     Access: Port  Disposition: Pending post-operative recovery    Disposition Plan   Expected discharge: 2 - 3 days, recommended to prior living arrangement once adequate pain management/ tolerating PO medications.    The patient's care was discussed with the Attending Physician, Dr. Alvarez, Bedside Nurse and Patient.    Tati Pope MD  Pediatric PGY3    Physician Attestation   I, Pedro Alvarez MD, saw this patient with the resident and agree with the resident/fellow's findings and plan of care as documented in the note.      I personally reviewed vital signs, medications and labs.    Key findings: I agree with the assessment as noted.    Pedro Alavrez MD  Date of Service (when I saw the patient): 107/19        Interval History   More pain today and feels warm. Pain in throat, arm, and now in left lower chest. No cough or fevers. Unable to swallow hydroxyurea capsules here given throat pain. Declining atarax and prn valium.     Data reviewed today: I reviewed all medications, new labs and imaging results over the last 24 hours. I personally reviewed no images or EKG's today.    Physical Exam   Vital Signs: Temp: 98.5  F (36.9  C) Temp src: Axillary BP: 126/79 Pulse: 79 Heart Rate: 87 Resp: 18 SpO2: 97 % O2 Device: None (Room air)    Weight: 154 lbs 15.73 oz     Constitutional: Appears sleepy, however is sitting upright in bed and answering questions  appropriately, cooperative, no apparent distress.   Eyes: EOMs grossly intact, sclera clear, conjunctiva normal  ENT: Normocephalic, without obvious abnormality, atraumatic, oral pharynx, granulation tissue, moist mucous membranes. No active bleeding.   Lymphatic: No significant cervical or supraclavicular lymphadenopathy.  Respiratory: Breathing comfortably, fair aeration, clear to auscultation bilaterally, no crackles or wheezing. Complains of pain at lower left lateral chest  Cardiovascular: Regular rate and rhythm, normal S1 and S2, left radial pulse normal, no significant edema  GI: Low to normal active bowel sounds, non-distended, non-tender, no hepatosplenomegaly  Extremities: Right arm immobilized in slight flexion  Neurologic: Awake, alert, oriented, cranial nerves grossly intact, neurovascularly intact    Data   Recent Labs   Lab 10/02/19  2004 10/01/19  1550   WBC 18.1* 13.2*   HGB 11.2* 11.8   MCV 88 87    205   NA  --  140   POTASSIUM  --  3.7   CHLORIDE  --  111*   CO2  --  24   BUN  --  9   CR  --  0.61   ANIONGAP  --  5   MICAH  --  7.7*   GLC  --  99   ALBUMIN  --  3.7   PROTTOTAL  --  7.5   BILITOTAL  --  1.7*   ALKPHOS  --  66   ALT  --  47   AST  --  47*     Medications     dextrose 5% and 0.45% NaCl 100 mL/hr at 10/07/19 0713     HYDROmorphone         acetaminophen  650 mg Oral Q6H     aspirin  81 mg Oral Daily     celecoxib  200 mg Oral Daily     deferasirox  900 mg Oral Daily     diazepam  2 mg Oral Q8H     erythromycin  400 mg Oral TID AC     famotidine  20 mg Oral BID     fluticasone-vilanterol  1 puff Inhalation Daily     gabapentin  600 mg Oral TID     heparin  5 mL Intracatheter Q28 Days     heparin lock flush  3-6 mL Intracatheter Q24H     hydroxyurea  2,000 mg Oral Daily     omeprazole  40 mg Oral Daily     oxyCODONE  10 mg Oral Q4H     sennosides  8.6 mg Oral BID     sertraline  50 mg Oral Daily     sodium chloride (PF)  10 mL Intracatheter Q28 Days

## 2019-10-07 NOTE — PROGRESS NOTES
CLINICAL NUTRITION SERVICES - PEDIATRIC ASSESSMENT NOTE    REASON FOR ASSESSMENT  Jennifer Cervantes is a 19 year old female seen by the dietitian for LOS.    ANTHROPOMETRICS  Height: 163.9 cm - 9/25  Weight: 70.3 kg - 10/4  BMI: 26.2 kg/m^2  Dosing Weight: 65.8 kg (admit weight)    Wt Readings from Last 3 Encounters:   10/04/19 70.3 kg (154 lb 15.7 oz)   09/25/19 65.4 kg (144 lb 2.9 oz)   09/05/19 65.3 kg (144 lb)     Comments: Patient lost 3.3 % of body weight since June 2019 - not significant. Patient reports prior to admission, her weight had been stable with no recent weight loss.    NUTRITION HISTORY  Patient is on a Regular diet at home.  Patient in significant amount of pain when I stopped by so history received was brief. Patient was eating well prior to admission and has only had marked decrease with pain s/p tonsillectomy. Chart noted fish and fish derived product allergy.  Information obtained from Patient and Chart  Factors affecting nutrition intake include: pain    CURRENT NUTRITION ORDERS  Diet: Mechanical/Dental Soft Diet  Comments: Patient consuming 100% of recorded meals including oatmeal and pudding. Patient reports no issues with nausea, vomiting, constipation, or diarrhea and that her appetite is improving. Patient reports continued pain with swallowing.    CURRENT NUTRITION SUPPORT   None    PHYSICAL FINDINGS  Observed  Patient appeared proportionate with no signs of muscle or fat loss.    Obtained from Chart/Interdisciplinary Team  Pt admitted for planned tonsillectomy and right elbow flexor pronator plasty, right elbow brachialis lengthening, right elbow biceps tenotomy, right thumb thenar release. Pt now POD 4 and remains admitted for IV hydration and pain control.    LABS  Labs reviewed    MEDICATIONS  Medications reviewed; D5 run @ 100 ml/hr providing 36 ml/kg, GIR of 1.26 mg/kg/min, and 6 kcal/kg.    ASSESSED NUTRITION NEEDS:  RDA/age: 38 kcal/kg and 0.8 g/kg of protein  BMR: 1458 kcal x  1.3-1.5 = 1895 - 2187 kcal  Estimated Energy Needs: 29 - 33 kcal/kg  Estimated Protein Needs: 0.8 g/kg  Estimated Fluid Needs: 2506 mLs for maintenance fluids or per primary team  Micronutrient Needs: RDA/age    PEDIATRIC NUTRITION STATUS VALIDATION  Patient does not meet criteria for malnutrition.    NUTRITION DIAGNOSIS:  Predicted suboptimal nutrient intake related to medical course and pain with swallowing as evidenced by potential to meet <100% of estimated needs through PO intake.    INTERVENTIONS  Nutrition Prescription  Pt to meet 100% of estimated needs through PO intake.    Nutrition Education:   Provided education on nutrition supplements available here. Patient reported she has a lot of pain with swallowing especially thin liquids like water and has not been able to do more than oatmeal, pudding, or spaghetti o's. As her pain starts to improve, she appeared open to trying supplements if her intake does not improve to help meet needs.     Implementation:  Meals/ Snack -- Continue to encourage PO intake through calorie dense soft foods as appetite and pain starts to resolve.   and Supplements -- Consider trying boost, boost breeze, or magic cups if intake does not improve as patient's pain starts to improve.    Goals  1. Pt to meet 100% of estimated needs through PO intake.  2. Patient to achieve weight maintenance during admission and maintain after discharge.    FOLLOW UP/MONITORING  Energy Intake --  Anthropometric measurements --    RECOMMENDATIONS  1. Continue to encourage PO intake through calorie dense soft foods as appetite and pain starts to resolve.    2. Consider trying boost, boost breeze, or magic cups if intake does not improve as patient's pain starts to improve.    3. As pain with swallowing improves and medically appropriate to do so, consider advancing diet to allow for more variety in food options.    4. Continue to monitor weight during admission to monitor nutrition status.    Patient  does not meet criteria for malnutrition.    Zaynab Worthy, YOLANDAN, LD  [phone]

## 2019-10-07 NOTE — PLAN OF CARE
A/vss, c/o pain most of shift, rating 9 out of 10.  Mostly throat pain but some chest pain.  Lidocaine spray to throat helped some.  Oxy changed to q 4 hr from q6hr with some improvement.  Chest xray done, right upper opacity noted, antibiotics given per order.  Dilaudid pca continues, 16 bumps this shift, 16 denied.  Some itching noted but no complaints.  Requested chicken tenders this afternoon but not on mechanical soft diet.  Continues to eat large amounts of oatmeal and vanilla pudding.  Continue to monitor.

## 2019-10-08 LAB
BASOPHILS # BLD AUTO: 0.2 10E9/L (ref 0–0.2)
BASOPHILS NFR BLD AUTO: 0.9 %
DIFFERENTIAL METHOD BLD: ABNORMAL
EOSINOPHIL # BLD AUTO: 0.4 10E9/L (ref 0–0.7)
EOSINOPHIL NFR BLD AUTO: 1.9 %
ERYTHROCYTE [DISTWIDTH] IN BLOOD BY AUTOMATED COUNT: 18.8 % (ref 10–15)
HCT VFR BLD AUTO: 27.8 % (ref 35–47)
HGB BLD-MCNC: 9.1 G/DL (ref 11.7–15.7)
IMM GRANULOCYTES # BLD: 0.1 10E9/L (ref 0–0.4)
IMM GRANULOCYTES NFR BLD: 0.7 %
LYMPHOCYTES # BLD AUTO: 4.1 10E9/L (ref 0.8–5.3)
LYMPHOCYTES NFR BLD AUTO: 20.9 %
MCH RBC QN AUTO: 29.6 PG (ref 26.5–33)
MCHC RBC AUTO-ENTMCNC: 32.7 G/DL (ref 31.5–36.5)
MCV RBC AUTO: 91 FL (ref 78–100)
MONOCYTES # BLD AUTO: 1.3 10E9/L (ref 0–1.3)
MONOCYTES NFR BLD AUTO: 6.5 %
NEUTROPHILS # BLD AUTO: 13.6 10E9/L (ref 1.6–8.3)
NEUTROPHILS NFR BLD AUTO: 69.1 %
NRBC # BLD AUTO: 0.2 10*3/UL
NRBC BLD AUTO-RTO: 1 /100
PLATELET # BLD AUTO: 237 10E9/L (ref 150–450)
RBC # BLD AUTO: 3.07 10E12/L (ref 3.8–5.2)
RETICS # AUTO: 73.7 10E9/L (ref 25–95)
RETICS/RBC NFR AUTO: 2.4 % (ref 0.5–2)
WBC # BLD AUTO: 19.7 10E9/L (ref 4–11)

## 2019-10-08 PROCEDURE — 25000132 ZZH RX MED GY IP 250 OP 250 PS 637: Performed by: STUDENT IN AN ORGANIZED HEALTH CARE EDUCATION/TRAINING PROGRAM

## 2019-10-08 PROCEDURE — 36592 COLLECT BLOOD FROM PICC: CPT | Performed by: STUDENT IN AN ORGANIZED HEALTH CARE EDUCATION/TRAINING PROGRAM

## 2019-10-08 PROCEDURE — 12000014 ZZH R&B PEDS UMMC

## 2019-10-08 PROCEDURE — 25800030 ZZH RX IP 258 OP 636: Performed by: PEDIATRICS

## 2019-10-08 PROCEDURE — 85025 COMPLETE CBC W/AUTO DIFF WBC: CPT | Performed by: STUDENT IN AN ORGANIZED HEALTH CARE EDUCATION/TRAINING PROGRAM

## 2019-10-08 PROCEDURE — 25000128 H RX IP 250 OP 636: Performed by: STUDENT IN AN ORGANIZED HEALTH CARE EDUCATION/TRAINING PROGRAM

## 2019-10-08 PROCEDURE — 25800030 ZZH RX IP 258 OP 636: Performed by: STUDENT IN AN ORGANIZED HEALTH CARE EDUCATION/TRAINING PROGRAM

## 2019-10-08 PROCEDURE — 25000132 ZZH RX MED GY IP 250 OP 250 PS 637: Performed by: PEDIATRICS

## 2019-10-08 PROCEDURE — 85045 AUTOMATED RETICULOCYTE COUNT: CPT | Performed by: STUDENT IN AN ORGANIZED HEALTH CARE EDUCATION/TRAINING PROGRAM

## 2019-10-08 PROCEDURE — 25000125 ZZHC RX 250: Performed by: STUDENT IN AN ORGANIZED HEALTH CARE EDUCATION/TRAINING PROGRAM

## 2019-10-08 RX ORDER — DEXTROSE MONOHYDRATE, SODIUM CHLORIDE, AND POTASSIUM CHLORIDE 50; 1.49; 4.5 G/1000ML; G/1000ML; G/1000ML
INJECTION, SOLUTION INTRAVENOUS CONTINUOUS
Status: DISCONTINUED | OUTPATIENT
Start: 2019-10-08 | End: 2019-10-11

## 2019-10-08 RX ORDER — KETOROLAC TROMETHAMINE 30 MG/ML
30 INJECTION, SOLUTION INTRAMUSCULAR; INTRAVENOUS EVERY 6 HOURS
Status: DISCONTINUED | OUTPATIENT
Start: 2019-10-08 | End: 2019-10-10

## 2019-10-08 RX ADMIN — ACETAMINOPHEN 650 MG: 325 TABLET, FILM COATED ORAL at 20:33

## 2019-10-08 RX ADMIN — ACETAMINOPHEN 650 MG: 325 TABLET, FILM COATED ORAL at 08:24

## 2019-10-08 RX ADMIN — LIDOCAINE: 40 CREAM TOPICAL at 10:20

## 2019-10-08 RX ADMIN — GABAPENTIN 600 MG: 300 CAPSULE ORAL at 08:23

## 2019-10-08 RX ADMIN — ASPIRIN 81 MG: 81 TABLET ORAL at 08:25

## 2019-10-08 RX ADMIN — OXYCODONE HYDROCHLORIDE 10 MG: 5 TABLET ORAL at 10:03

## 2019-10-08 RX ADMIN — ACETAMINOPHEN 650 MG: 325 TABLET, FILM COATED ORAL at 14:51

## 2019-10-08 RX ADMIN — OXYCODONE HYDROCHLORIDE 10 MG: 5 TABLET ORAL at 02:43

## 2019-10-08 RX ADMIN — POTASSIUM CHLORIDE, DEXTROSE MONOHYDRATE AND SODIUM CHLORIDE: 150; 5; 450 INJECTION, SOLUTION INTRAVENOUS at 22:33

## 2019-10-08 RX ADMIN — KETOROLAC TROMETHAMINE 30 MG: 30 INJECTION, SOLUTION INTRAMUSCULAR; INTRAVENOUS at 17:32

## 2019-10-08 RX ADMIN — GABAPENTIN 600 MG: 300 CAPSULE ORAL at 14:51

## 2019-10-08 RX ADMIN — GABAPENTIN 600 MG: 300 CAPSULE ORAL at 20:33

## 2019-10-08 RX ADMIN — CEFTRIAXONE SODIUM 2 G: 2 INJECTION, POWDER, FOR SOLUTION INTRAMUSCULAR; INTRAVENOUS at 11:36

## 2019-10-08 RX ADMIN — ERYTHROMYCIN ETHYLSUCCINATE 400 MG: 400 SUSPENSION ORAL at 08:23

## 2019-10-08 RX ADMIN — OXYCODONE HYDROCHLORIDE 10 MG: 5 TABLET ORAL at 18:29

## 2019-10-08 RX ADMIN — DIAZEPAM 2 MG: 2 TABLET ORAL at 01:33

## 2019-10-08 RX ADMIN — POTASSIUM CHLORIDE, DEXTROSE MONOHYDRATE AND SODIUM CHLORIDE: 150; 5; 450 INJECTION, SOLUTION INTRAVENOUS at 00:28

## 2019-10-08 RX ADMIN — OXYCODONE HYDROCHLORIDE 10 MG: 5 TABLET ORAL at 14:51

## 2019-10-08 RX ADMIN — Medication 2000 MG: at 08:22

## 2019-10-08 RX ADMIN — FLUTICASONE FUROATE AND VILANTEROL TRIFENATATE 1 PUFF: 100; 25 POWDER RESPIRATORY (INHALATION) at 10:21

## 2019-10-08 RX ADMIN — OXYCODONE HYDROCHLORIDE 10 MG: 5 TABLET ORAL at 06:30

## 2019-10-08 RX ADMIN — KETOROLAC TROMETHAMINE 30 MG: 30 INJECTION, SOLUTION INTRAMUSCULAR; INTRAVENOUS at 11:36

## 2019-10-08 RX ADMIN — OXYCODONE HYDROCHLORIDE 10 MG: 5 TABLET ORAL at 22:16

## 2019-10-08 RX ADMIN — ACETAMINOPHEN 650 MG: 325 TABLET, FILM COATED ORAL at 02:43

## 2019-10-08 RX ADMIN — CELECOXIB 200 MG: 200 CAPSULE ORAL at 08:23

## 2019-10-08 RX ADMIN — FAMOTIDINE 20 MG: 20 TABLET ORAL at 08:24

## 2019-10-08 RX ADMIN — FAMOTIDINE 20 MG: 20 TABLET ORAL at 20:33

## 2019-10-08 RX ADMIN — AZITHROMYCIN MONOHYDRATE 250 MG: 500 INJECTION, POWDER, LYOPHILIZED, FOR SOLUTION INTRAVENOUS at 12:54

## 2019-10-08 RX ADMIN — SENNOSIDES 8.6 MG: 8.6 TABLET, FILM COATED ORAL at 08:24

## 2019-10-08 RX ADMIN — SENNOSIDES 8.6 MG: 8.6 TABLET, FILM COATED ORAL at 20:33

## 2019-10-08 RX ADMIN — OMEPRAZOLE 40 MG: 20 CAPSULE, DELAYED RELEASE ORAL at 08:24

## 2019-10-08 RX ADMIN — ERYTHROMYCIN ETHYLSUCCINATE 400 MG: 400 SUSPENSION ORAL at 14:51

## 2019-10-08 RX ADMIN — ERYTHROMYCIN ETHYLSUCCINATE 400 MG: 400 SUSPENSION ORAL at 17:37

## 2019-10-08 RX ADMIN — SERTRALINE HYDROCHLORIDE 50 MG: 50 TABLET ORAL at 08:24

## 2019-10-08 NOTE — PLAN OF CARE
Tmax 99.2. OVSS. C/o throat pain. Taking bumps from dilaudid PCA. Good UOP. Stool x 1 this shift. Drinking some fluids. Eating vanilla pudding. No family at the bedside. Hourly rounding complete.

## 2019-10-08 NOTE — PLAN OF CARE
VSS, patient rating pain at 10/10 this morning-using bumps of pca. PCA dose increased and toradol scheduled. Pt still c/o pain 9-10/10, but resting comfortably after getting up for shower between reaccessing port. No family present at bedside.

## 2019-10-08 NOTE — PROGRESS NOTES
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 Guy (tonsillectomy) and Ortho -- Dr. Franco (right elbow flexor pronator plasty, right elbow brachialis lengthening, right elbow biceps tenotomy, right thumb thenar release). Now POD#6 with new chest pain and infiltrates concerning for acute chest syndrome. Also still working on pain control and hydration.     Changes Today:  - increase dilaudid PCA bumps 0.2 --> 0.3 with 4 bumps per hour  - restart toradol  - discontinue scheduled valium given improvement in arm pain  - encourage ambulation and incentive spirometry  - O2 for comfort     Hematology  History of left MCA stroke  Hypercoagulability  History of TIA  - Continue home ASA 81 mg qDay     HbSS  - Continue home deferasirox 900 mg daily (if family brings from home)  - Continue home erythromycin 400 mg TID  - Continue home hydroxyurea 2000 mg daily-- will make a liquid today  - AM CBC with retic     Neuro  Chronic pain  Acute post-operative pain  - PACCT team consulted for dilaudid PCA guidance, appreciate recs  - PT consulted for early ambulation  - Hydromorphone PCA at basal rate 0.0, bumps 0.3, 1 hour max of 1.2 mg  - Oxycodone 10mg PO Q4H  - Scheduled tylenol q6h PO  - Toradol IV q6h  - Diazepam oral 2 mg q6h PRN for muscle spasms  - Continue gabapentin 600mg TID  - Continue Zoloft 50mg daily    ENT  S/p bilateral tonsillectomy  - 10 mg decadron IV x3 doses, now complete. Will do 5 days of prednisolone taper  - 2% viscous lidocaine rinses q2h prn  - Soft diet x 2 weeks  - ENT must be paged immediately for any  spitting up or coughing of blood     Resp  Asthma  - Continue home albuterol 2 puffs q4h prn   - On symbicort BID at home. Started on Breo Elipta 1 puff daily.  - PT consult placed and encouraged incentive spirometry  - O2 sats >94% on RA. O2 mask PRN for desaturations.    Acute chest syndrome vs. Infection  - Ceftriaxone 2 g q24h  - Azithromycin 500 mg x 1 followed by 250 mg q24h for total of 5 doses  - O2 for comfort     FEN/GI  - D5 1/2 NS + 20 KCl at 100 mL/hr  - Soft diet x 2 weeks  - Famotidine 20mg BID  - Miralax 17g daily  - Senokot 8.6mg daily     Access: Port  Disposition: Pending post-operative recovery    Disposition Plan   Expected discharge: 2 - 3 days, recommended to prior living arrangement once adequate pain management/ tolerating PO medications.    The patient's care was discussed with the Attending Physician, Dr. Alvarez, Bedside Nurse and Patient.    Tati Pope MD  Pediatric PGY3    Physician Attestation   I, Pedro Alvarez MD, saw this patient with the resident and agree with the resident/fellow's findings and plan of care as documented in the note.      I personally reviewed vital signs, medications and labs.    Key findings: I agree with the assessment as noted.    Pedro Alvarez MD  Date of Service (when I saw the patient): 10/8/19        Interval History   More pain today, particularly left chest. Feeling SOB, not improved with albuterol. Legs look swollen, wondering why this is. No fevers.     Data reviewed today: I reviewed all medications, new labs and imaging results over the last 24 hours. I personally reviewed no images or EKG's today.    Physical Exam   Vital Signs: Temp: 99  F (37.2  C) Temp src: Oral BP: 122/80 Pulse: 96 Heart Rate: 103 Resp: 20 SpO2: 96 % O2 Device: None (Room air)    Weight: 154 lbs 15.73 oz     Constitutional: Appears sleepy, however is sitting upright in bed and answering questions appropriately, cooperative, no apparent distress.    Eyes: EOMs grossly intact, sclera clear, conjunctiva normal  ENT: Normocephalic, without obvious abnormality, atraumatic, oral pharynx, granulation tissue, moist mucous membranes. No active bleeding.   Lymphatic: No significant cervical or supraclavicular lymphadenopathy.  Respiratory: Mild nasal flaring but otherwise no increased WOB, normal RR, fair aeration, clear to auscultation bilaterally, no crackles or wheezing. Complains of pain at lower left lateral chest  Cardiovascular: Regular rate and rhythm, normal S1 and S2, left radial pulse normal, no significant edema  GI: Low to normal active bowel sounds, non-distended, non-tender, no hepatosplenomegaly  Extremities: Right arm immobilized in slight flexion  Neurologic: Awake, alert, oriented, cranial nerves grossly intact, neurovascularly intact    Data   Recent Labs   Lab 10/08/19  0624 10/07/19  1051 10/02/19  2004 10/01/19  1550   WBC 19.7* 19.2* 18.1* 13.2*   HGB 9.1* 9.4* 11.2* 11.8   MCV 91 91 88 87    216 231 205   NA  --  143  --  140   POTASSIUM  --  3.2*  --  3.7   CHLORIDE  --  110*  --  111*   CO2  --  27  --  24   BUN  --  6*  --  9   CR  --  0.45*  --  0.61   ANIONGAP  --  6  --  5   MICAH  --  7.6*  --  7.7*   GLC  --  93  --  99   ALBUMIN  --   --   --  3.7   PROTTOTAL  --   --   --  7.5   BILITOTAL  --   --   --  1.7*   ALKPHOS  --   --   --  66   ALT  --   --   --  47   AST  --   --   --  47*     Medications     dextrose 5% and 0.45% NaCl + KCl 20 mEq/L 100 mL/hr at 10/08/19 0028     HYDROmorphone         acetaminophen  650 mg Oral Q6H     aspirin  81 mg Oral Daily     azithromycin  250 mg Intravenous Q24H     cefTRIAXone  2 g Intravenous Q24H     deferasirox  900 mg Oral Daily     erythromycin  400 mg Oral TID AC     famotidine  20 mg Oral BID     fluticasone-vilanterol  1 puff Inhalation Daily     gabapentin  600 mg Oral TID     heparin  5 mL Intracatheter Q28 Days     heparin lock flush  3-6 mL Intracatheter Q24H     hydroxyurea   2,000 mg Oral Daily     ketorolac  30 mg Intravenous Q6H     omeprazole  40 mg Oral Daily     oxyCODONE  10 mg Oral Q4H     sennosides  8.6 mg Oral BID     sertraline  50 mg Oral Daily     sodium chloride (PF)  10 mL Intracatheter Q28 Days

## 2019-10-08 NOTE — PLAN OF CARE
Afebrile, VSS. LS clear. Rating pain 9/10 in throat/arm. Utilizing dilaudid PCA. 4 bumps given, 2 bumps denied. No PRNs given. Good oral intake with adequate UOP. No stool. IV fluids infusing. No family present at bedside. Will continue to monitor and follow plan of care.

## 2019-10-09 ENCOUNTER — APPOINTMENT (OUTPATIENT)
Dept: PHYSICAL THERAPY | Facility: CLINIC | Age: 19
DRG: 500 | End: 2019-10-09
Attending: PEDIATRICS
Payer: COMMERCIAL

## 2019-10-09 LAB
ANION GAP SERPL CALCULATED.3IONS-SCNC: 6 MMOL/L (ref 3–14)
BASOPHILS # BLD AUTO: 0.1 10E9/L (ref 0–0.2)
BASOPHILS NFR BLD AUTO: 0.6 %
BUN SERPL-MCNC: 5 MG/DL (ref 7–30)
CALCIUM SERPL-MCNC: 8 MG/DL (ref 8.5–10.1)
CHLORIDE SERPL-SCNC: 104 MMOL/L (ref 94–109)
CO2 SERPL-SCNC: 31 MMOL/L (ref 20–32)
CREAT SERPL-MCNC: 0.54 MG/DL (ref 0.52–1.04)
DIFFERENTIAL METHOD BLD: ABNORMAL
EOSINOPHIL # BLD AUTO: 0.4 10E9/L (ref 0–0.7)
EOSINOPHIL NFR BLD AUTO: 2.5 %
ERYTHROCYTE [DISTWIDTH] IN BLOOD BY AUTOMATED COUNT: 19.2 % (ref 10–15)
GFR SERPL CREATININE-BSD FRML MDRD: >90 ML/MIN/{1.73_M2}
GLUCOSE SERPL-MCNC: 105 MG/DL (ref 70–99)
HCT VFR BLD AUTO: 28.1 % (ref 35–47)
HGB BLD-MCNC: 9.1 G/DL (ref 11.7–15.7)
IMM GRANULOCYTES # BLD: 0.1 10E9/L (ref 0–0.4)
IMM GRANULOCYTES NFR BLD: 0.8 %
LYMPHOCYTES # BLD AUTO: 2.8 10E9/L (ref 0.8–5.3)
LYMPHOCYTES NFR BLD AUTO: 16.3 %
MCH RBC QN AUTO: 29.4 PG (ref 26.5–33)
MCHC RBC AUTO-ENTMCNC: 32.4 G/DL (ref 31.5–36.5)
MCV RBC AUTO: 91 FL (ref 78–100)
MONOCYTES # BLD AUTO: 1.1 10E9/L (ref 0–1.3)
MONOCYTES NFR BLD AUTO: 6.3 %
NEUTROPHILS # BLD AUTO: 12.4 10E9/L (ref 1.6–8.3)
NEUTROPHILS NFR BLD AUTO: 73.5 %
NRBC # BLD AUTO: 0.4 10*3/UL
NRBC BLD AUTO-RTO: 2 /100
PLATELET # BLD AUTO: 225 10E9/L (ref 150–450)
POTASSIUM SERPL-SCNC: 4.3 MMOL/L (ref 3.4–5.3)
RBC # BLD AUTO: 3.09 10E12/L (ref 3.8–5.2)
RETICS # AUTO: ABNORMAL 10E9/L (ref 25–95)
RETICS/RBC NFR AUTO: 3.9 % (ref 0.5–2)
SODIUM SERPL-SCNC: 141 MMOL/L (ref 133–144)
WBC # BLD AUTO: 16.9 10E9/L (ref 4–11)

## 2019-10-09 PROCEDURE — 97530 THERAPEUTIC ACTIVITIES: CPT | Mod: GP

## 2019-10-09 PROCEDURE — 25000132 ZZH RX MED GY IP 250 OP 250 PS 637: Performed by: STUDENT IN AN ORGANIZED HEALTH CARE EDUCATION/TRAINING PROGRAM

## 2019-10-09 PROCEDURE — 40000275 ZZH STATISTIC RCP TIME EA 10 MIN

## 2019-10-09 PROCEDURE — 80048 BASIC METABOLIC PNL TOTAL CA: CPT | Performed by: STUDENT IN AN ORGANIZED HEALTH CARE EDUCATION/TRAINING PROGRAM

## 2019-10-09 PROCEDURE — 25800030 ZZH RX IP 258 OP 636: Performed by: PEDIATRICS

## 2019-10-09 PROCEDURE — 36592 COLLECT BLOOD FROM PICC: CPT | Performed by: STUDENT IN AN ORGANIZED HEALTH CARE EDUCATION/TRAINING PROGRAM

## 2019-10-09 PROCEDURE — 85025 COMPLETE CBC W/AUTO DIFF WBC: CPT | Performed by: STUDENT IN AN ORGANIZED HEALTH CARE EDUCATION/TRAINING PROGRAM

## 2019-10-09 PROCEDURE — 12000014 ZZH R&B PEDS UMMC

## 2019-10-09 PROCEDURE — 94640 AIRWAY INHALATION TREATMENT: CPT

## 2019-10-09 PROCEDURE — 85045 AUTOMATED RETICULOCYTE COUNT: CPT | Performed by: STUDENT IN AN ORGANIZED HEALTH CARE EDUCATION/TRAINING PROGRAM

## 2019-10-09 PROCEDURE — 99233 SBSQ HOSP IP/OBS HIGH 50: CPT | Performed by: NURSE PRACTITIONER

## 2019-10-09 PROCEDURE — 97162 PT EVAL MOD COMPLEX 30 MIN: CPT | Mod: GP

## 2019-10-09 PROCEDURE — 25800030 ZZH RX IP 258 OP 636: Performed by: STUDENT IN AN ORGANIZED HEALTH CARE EDUCATION/TRAINING PROGRAM

## 2019-10-09 PROCEDURE — 25000128 H RX IP 250 OP 636: Performed by: STUDENT IN AN ORGANIZED HEALTH CARE EDUCATION/TRAINING PROGRAM

## 2019-10-09 PROCEDURE — 25000132 ZZH RX MED GY IP 250 OP 250 PS 637: Performed by: PEDIATRICS

## 2019-10-09 RX ORDER — GABAPENTIN 300 MG/1
900 CAPSULE ORAL 3 TIMES DAILY
Status: DISCONTINUED | OUTPATIENT
Start: 2019-10-09 | End: 2019-10-13 | Stop reason: HOSPADM

## 2019-10-09 RX ADMIN — KETOROLAC TROMETHAMINE 30 MG: 30 INJECTION, SOLUTION INTRAMUSCULAR; INTRAVENOUS at 00:46

## 2019-10-09 RX ADMIN — FAMOTIDINE 20 MG: 20 TABLET ORAL at 21:37

## 2019-10-09 RX ADMIN — ERYTHROMYCIN ETHYLSUCCINATE 400 MG: 400 SUSPENSION ORAL at 17:04

## 2019-10-09 RX ADMIN — ACETAMINOPHEN 650 MG: 325 TABLET, FILM COATED ORAL at 21:37

## 2019-10-09 RX ADMIN — POTASSIUM CHLORIDE, DEXTROSE MONOHYDRATE AND SODIUM CHLORIDE: 150; 5; 450 INJECTION, SOLUTION INTRAVENOUS at 20:33

## 2019-10-09 RX ADMIN — CEFTRIAXONE SODIUM 2 G: 2 INJECTION, POWDER, FOR SOLUTION INTRAMUSCULAR; INTRAVENOUS at 11:56

## 2019-10-09 RX ADMIN — ERYTHROMYCIN ETHYLSUCCINATE 400 MG: 400 SUSPENSION ORAL at 07:48

## 2019-10-09 RX ADMIN — OXYCODONE HYDROCHLORIDE 10 MG: 5 TABLET ORAL at 18:38

## 2019-10-09 RX ADMIN — OXYCODONE HYDROCHLORIDE 10 MG: 5 TABLET ORAL at 21:34

## 2019-10-09 RX ADMIN — KETOROLAC TROMETHAMINE 30 MG: 30 INJECTION, SOLUTION INTRAMUSCULAR; INTRAVENOUS at 12:31

## 2019-10-09 RX ADMIN — POTASSIUM CHLORIDE, DEXTROSE MONOHYDRATE AND SODIUM CHLORIDE: 150; 5; 450 INJECTION, SOLUTION INTRAVENOUS at 08:41

## 2019-10-09 RX ADMIN — KETOROLAC TROMETHAMINE 30 MG: 30 INJECTION, SOLUTION INTRAMUSCULAR; INTRAVENOUS at 06:20

## 2019-10-09 RX ADMIN — GABAPENTIN 900 MG: 300 CAPSULE ORAL at 21:38

## 2019-10-09 RX ADMIN — OXYCODONE HYDROCHLORIDE 10 MG: 5 TABLET ORAL at 13:56

## 2019-10-09 RX ADMIN — FAMOTIDINE 20 MG: 20 TABLET ORAL at 08:21

## 2019-10-09 RX ADMIN — GABAPENTIN 600 MG: 300 CAPSULE ORAL at 08:21

## 2019-10-09 RX ADMIN — ACETAMINOPHEN 650 MG: 325 TABLET, FILM COATED ORAL at 02:39

## 2019-10-09 RX ADMIN — Medication 2000 MG: at 08:32

## 2019-10-09 RX ADMIN — OMEPRAZOLE 40 MG: 20 CAPSULE, DELAYED RELEASE ORAL at 08:21

## 2019-10-09 RX ADMIN — ERYTHROMYCIN ETHYLSUCCINATE 400 MG: 400 SUSPENSION ORAL at 11:58

## 2019-10-09 RX ADMIN — ASPIRIN 81 MG: 81 TABLET ORAL at 08:21

## 2019-10-09 RX ADMIN — OXYCODONE HYDROCHLORIDE 10 MG: 5 TABLET ORAL at 06:17

## 2019-10-09 RX ADMIN — ACETAMINOPHEN 650 MG: 325 TABLET, FILM COATED ORAL at 15:27

## 2019-10-09 RX ADMIN — Medication: at 12:50

## 2019-10-09 RX ADMIN — SENNOSIDES 8.6 MG: 8.6 TABLET, FILM COATED ORAL at 21:38

## 2019-10-09 RX ADMIN — SERTRALINE HYDROCHLORIDE 50 MG: 50 TABLET ORAL at 08:21

## 2019-10-09 RX ADMIN — OXYCODONE HYDROCHLORIDE 10 MG: 5 TABLET ORAL at 10:28

## 2019-10-09 RX ADMIN — AZITHROMYCIN MONOHYDRATE 250 MG: 500 INJECTION, POWDER, LYOPHILIZED, FOR SOLUTION INTRAVENOUS at 12:40

## 2019-10-09 RX ADMIN — GABAPENTIN 900 MG: 300 CAPSULE ORAL at 13:56

## 2019-10-09 RX ADMIN — FLUTICASONE FUROATE AND VILANTEROL TRIFENATATE 1 PUFF: 100; 25 POWDER RESPIRATORY (INHALATION) at 09:00

## 2019-10-09 RX ADMIN — ACETAMINOPHEN 650 MG: 325 TABLET, FILM COATED ORAL at 08:21

## 2019-10-09 RX ADMIN — SENNOSIDES 8.6 MG: 8.6 TABLET, FILM COATED ORAL at 08:21

## 2019-10-09 RX ADMIN — OXYCODONE HYDROCHLORIDE 10 MG: 5 TABLET ORAL at 02:39

## 2019-10-09 RX ADMIN — KETOROLAC TROMETHAMINE 30 MG: 30 INJECTION, SOLUTION INTRAMUSCULAR; INTRAVENOUS at 18:33

## 2019-10-09 NOTE — PLAN OF CARE
AVSS. Oriented x3 but very lethargic and drowsy throughout day, but alert. LS clear on RA with intermittent cough. C/o full body pain at 9-10/10 all day; scheduled pain meds continue with dilaudid gtt. Good PO intake. Good UOP; orange tinged; no stool. Casted arm shows good CMS with good cap refill. No foot swelling noted. Pt ambulated x2 this afternoon in hallways. Port infusing with no issues. No family at bedside. Pt updated on POC for evening. Will continue to monitor and reassess.

## 2019-10-09 NOTE — PLAN OF CARE
PT Unit 5: PT orders acknowledged and evaluation completed. 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 While patient is SBA with ambulation, pain medications create inconsistency in steadiness with decreased gait speed and impaired mobility from her PLOF. Will plan to see daily for safe mobilization during pain crisis to assist in returning to PLOF and facilitating safe discharge home. Thank you for this referral.    Pavithra Pagan, PT, DPT

## 2019-10-09 NOTE — PLAN OF CARE
AVSS. Pt having nasal flaring when MD in room, O2 ordered. Pt currently on 1 LPM O2 as nasal flaring continues. Pt walked one lap around hallway with nurse. Pt very lethargic, and complaints of pain and feet swelling, MD notified. No further interventions. Adequate UOP. No n/v. No stool this evening. Hourly rounding complete. Continue to monitor.

## 2019-10-09 NOTE — PROGRESS NOTES
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 Pain is currently localized to left chest and back in addition to her operative arm and her throat. Despite high pain ratings on NRS, patient has been reported as calm and cooperative when awake which is different than her reported typical demeanor with poor pain control (self-described as angry and agitated). Continuing to work to find balance between adequate pain control and sedation.      Diagnosis  Types and sources of pain:  - Nociceptive: acute post operative pain secondary to above procedures, left chest ?ACS  - Neuropathic: nerve involvement in surgery, potential exists - new report of \"burning\" pain in bilateral arms  - Psycho-social-spiritual-emotional: fear of stigma and worry around being identified as \"drug-seeking\" in an unfamiliar facility compounds anxiety, which may negatively impact pain  - Chronic: history of multiple sickle cell crises per year; chronic back pain, improving after breast reduction " "late spring/early summer  Advance care planning:   - Not appropriate to address at this visit. Assessments will be ongoing.  Discharge planning: TBD    Recommendations    POSTOPERATIVE PAIN MANAGEMENT AND RECOMMENDATIONS:   Simple Analgesia:  - Acetaminophen 15 mg/kg po Q6h scheduled.  - NSAID: (10/8) restarted ketorolac 30 mg IV Q6h scheduled   - when ready to resume po NSAID, start celecoxib 200 mg po once daily (previously on 100 mg po twice daily, changed to one dose due to T&A and anticipated difficulty swallowing) vs ibuprofen 600mg po Q6h     Opioid Analgesia:(continuing PCA today given concern for VOC)  - oxycodone 10 mg po Q4h scheduled in place of IV PCA infusion rate, leave PCA doses available.    - If increased baseline sedation, either decrease to 7.5 mg po Q4h or space doses: 10 mg po Q6h  - agree with decreasing hydromorphone IV PCA as follows:   PCA dose: 0.3 mg  Lockout interval: 15 minutes  Continuous rate: 0 mg/hr  One hour dose limit: 0.9 mg (i.e. 3 boluses allowed in 1 hour)  - if sedation post PCA doses, consider decreasing to 0.2 mg/dose.  - if inadequate analgesia AND patient is not too sedate, return lockout interval to Q10 minutes, keeping hourly total the same  - Will consider further transition off PCA tomorrow pending clinical status    ADJUVANT ANALGESIA  - Gabapentin 900 mg TID (temporary increase due to neuropathic pain)   - Diazepam 2 mg po Q6h PRN muscle spasms  - hydroxyzine 25 mg IV/po Q6h PRN pain as an opioid-sparing adjuvant  - lidocaine swish/gargle per ENT     ADVERSE REACTION MANAGEMENT  Constipation: PLEASE SCHEDULE as soon as able to take oral medications:  \"Mush\": Start polyethylene glycol 3350, 17g daily  \"Push\": Start senna, 5 mls or 1 tab HS  \"Uncorking\": Consider dulcolax suppository or Fleet enema if no stool in 24 hours.  Pruritus: If opioid-induced pruritus, would start naloxone continuous infusion at 0.5 mcg/kg/hr.  Can titrate upwards (usually done in 0.5 " mcg/kg/hr increments) till a maximum dose of 2 mcg/kg/hr is reached.  If pruritus is still a distressing symptom at maximum doses of naloxone, contact the PACCT provider on call for assistance with an opioid rotation.  Note that opioid-induced pruritus is NOT a histamine-mediated reaction, therefore antihistamines (such as diphenhydramine/Benadryl ) are generally ineffective in resolving this symptom.  Nausea/Vomiting: recommend PRN ondansetron as first line treatment     Non-medication strategies:   - Child Life Specialist, mental health provider, and/or caregiver support, when appropriate and available   - provided patient with notepad and marker as a communication aid   - encourage cold soft foods for anesthetic benefit (ex: popsicles, sherbet, etc)   - Allow choices where permitted, positioning, rapport builiding   - Cognitive: auditory stimuli (e.g. tablets), control, controlled breathing, distraction, imagery, hypnosis (by trained provider only), modeling, relaxation, prepare for coping techniques and/or teaching procedures, relaxation   - Biophysical: environmental modification, holding, touching, massage, heat or cold application   - Distraction: music, TV, video games, guided imagery, deep breathing, conversation  Other considerations: Older patients may or may not want caregiver presence. Establish rapport to increase cooperation. Allow to watch procedure, if requested    The above recommendations are based on the WHO Guidelines for the Pharmacological Treatment of Persisting Pain in Children with Medical Illnesses: (1) using a two-step strategy, (2) dosing at regular intervals, (3) using the appropriate route of administration, and (4) adapting treatment to the individual child (available at: http://apps.who.int/iris/bitstream/32642/19786/1/9789241548120_Guidelines.pdf).    Thank you for the opportunity to participate in the care of Jennifer and her family.  Please contact the Pain and Advanced/Complex Care  Team (PACCT) with any emergent needs via text page to the PACCT general pager ([phone], answered 8-4:30 Monday to Friday).  After 4:30 pm and on weekends/holidays, please refer to the [facility] or [facility] on-call schedule.    The above assessment and recommendations were discussed with Jennifer's care team.  All parties involved agree with the above recommendations.  A total of 35 minutes were spent face-to-face with and in the coordination of care of Jennifer Cervantes.  Greater than 50% of my time on the unit was spent counseling the patient and/or coordinating care.    Echo Iniguez, NP, APRN CNP   Pain and Advanced/Complex Care Team (PACCT)  [facility]  PACCT Pager: [phone]    SUBJECTIVE: Interim History  Left chest pain continues, increased shortness of breath vs. Yesterday but improved from last evening. Surgical pain baseline to improved    OBJECTIVE: Last 24 hours (from 08:00 yesterday morning to 08:00 this morning)  VITALS: Reviewed; all vital signs were within normal limits for age.  INS/OUTS:   Taking PO? yes  Bowel movements? yes (Last documented bowel movement: 10/8, soft)  PAIN (0-10 Numeric Pain Rating Scale, with 10 being the worst pain imaginable): 8-10/10 (left chest, back, right arm, throat)    Current Medications  I have reviewed this Jennifer's medication profile and medications during this hospitalization.    Current Facility-Administered Medications   Medication     acetaminophen (TYLENOL) tablet 650 mg     albuterol (PROAIR HFA/PROVENTIL HFA/VENTOLIN HFA) 108 (90 Base) MCG/ACT inhaler 2 puff     aspirin EC tablet 81 mg     azithromycin (ZITHROMAX) 250 mg in sodium chloride 0.9 % 250 mL intermittent infusion     cefTRIAXone (ROCEPHIN) 2 g vial to attach to  ml bag for ADULTS or NS 50 ml bag for PEDS     deferasirox (JADENU) tablet 900 mg - PATIENT SUPPLIED MEDICATION     dextrose 5% and 0.45% NaCl + KCl 20 mEq/L infusion     diazepam (VALIUM)  tablet 2 mg     EPINEPHrine (ADRENALIN) kit 0.3 mg     erythromycin ethylsuccinate (ERYPED) suspension 400 mg     famotidine (PEPCID) tablet 20 mg     fluticasone-vilanterol (BREO ELLIPTA) 100-25 MCG/INH inhaler 1 puff     gabapentin (NEURONTIN) capsule 600 mg     heparin 100 UNIT/ML injection 5 mL     heparin lock flush 10 UNIT/ML injection 3-6 mL     heparin lock flush 10 UNIT/ML injection 3-6 mL     HYDROmorphone (DILAUDID) PCA 0.2 mg/mL OPIOID TOLERANT     hydroxyurea (HYDREA/DROXIA) suspension CHEMOTHERAPY 2,000 mg     hydrOXYzine (ATARAX) tablet 25 mg     ketorolac (TORADOL) injection 30 mg     lidocaine (LMX4) cream     lidocaine (XYLOCAINE) 2 % solution 5 mL     lidocaine 1 % 0.1-1 mL     naloxone (NARCAN) injection 0.1-0.4 mg     omeprazole (priLOSEC) CR capsule 40 mg     oxyCODONE (ROXICODONE) tablet 10 mg     phenol (CHLORASEPTIC) 1.4 % spray 1 mL     sennosides (SENOKOT) tablet 8.6 mg     sertraline (ZOLOFT) tablet 50 mg     sodium chloride (PF) 0.9% PF flush 0.2-10 mL     sodium chloride (PF) 0.9% PF flush 10 mL     Medications related to this visit are as follows (with PRN use indicated from 08:00 yesterday morning to 08:00 this morning):   Medications related to Pain Management (From now, onward)    Start     Dose/Rate Route Frequency Ordered Stop    10/09/19 1400  gabapentin (NEURONTIN) capsule 900 mg      900 mg Oral 3 TIMES DAILY 10/09/19 1126      10/09/19 1130  HYDROmorphone (DILAUDID) PCA 0.2 mg/mL OPIOID TOLERANT       Intravenous CONTINUOUS 10/09/19 1128      10/08/19 1200  ketorolac (TORADOL) injection 30 mg      30 mg  over 5 Minutes Intravenous EVERY 6 HOURS 10/08/19 0914 10/13/19 1159    10/07/19 1020  oxyCODONE (ROXICODONE) tablet 10 mg      10 mg Oral EVERY 4 HOURS 10/07/19 0952      10/07/19 0959  diazepam (VALIUM) tablet 2 mg      2 mg Oral EVERY 6 HOURS PRN 10/07/19 0959      10/06/19 2000  sennosides (SENOKOT) tablet 8.6 mg      8.6 mg Oral 2 TIMES DAILY 10/06/19 0945      10/06/19  0716  hydrOXYzine (ATARAX) tablet 25 mg      25 mg Oral EVERY 6 HOURS PRN 10/06/19 0716      10/05/19 2100  acetaminophen (TYLENOL) tablet 650 mg      650 mg Oral EVERY 6 HOURS 10/05/19 1424      10/02/19 0700  aspirin EC tablet 81 mg      81 mg Oral DAILY 10/01/19 1605      10/01/19 1456  lidocaine 1 % 0.1-1 mL      0.1-1 mL Other EVERY 1 HOUR PRN 10/01/19 1501      10/01/19 1456  lidocaine (LMX4) cream       Topical EVERY 1 HOUR PRN 10/01/19 1501           Hydromorphone PCA:    - hydromorphone continuous infusion + PCA:  PCA dose: 0.3 mg  Lockout interval: 10 minutes  Continuous rate: 0 mg  One hour dose limit: 1.2 mg (i.e. 4 boluses allowed in 1 hour)    Delivered Denied Amount (mg)   Day (14:54) 12 12 3.1   Evening (22:) 6 9 1.8   Overnight (07:12) 7 9 2.1   24h Total 12 30 7   History (in mg, ending am of date below)             Physical Examination  Awake, alert. Uncomfortable-appearing. After 2 PCA doses, patient became more lethargic, drowsy  Normocephalic, nasal cannula in place  Mild tachypnea at rest. LCTAB  Abdomen soft, round, nontender  Right arm in cast. DUARTE. Bilateral LE edema, nonpitting    Laboratory/Imaging/Pathology  CHEMISTRY  AST   Date Value Ref Range Status   10/01/2019 47 (H) 0 - 45 U/L Final     ALT   Date Value Ref Range Status   10/01/2019 47 0 - 50 U/L Final     Creatinine   Date Value Ref Range Status   10/09/2019 0.54 0.52 - 1.04 mg/dL Final       Estimated Creatinine Clearance: 161.9 mL/min (based on SCr of 0.54 mg/dL).    HEMATOLOGY  Platelet Count   Date Value Ref Range Status   10/09/2019 225 150 - 450 10e9/L Final     WBC   Date Value Ref Range Status   10/09/2019 16.9 (H) 4.0 - 11.0 10e9/L Final     Hemoglobin   Date Value Ref Range Status   10/09/2019 9.1 (L) 11.7 - 15.7 g/dL Final     All other laboratory values, medical imaging and pathology reports acquired/resulted in the last 24 hours were reviewed.  Refer to the EMR for any details not referenced above.

## 2019-10-09 NOTE — PROGRESS NOTES
Pediatric Integrative Medicine Initial Consultation    Primary Care provider: Jeimy Decker  Consulting Provider: Tati Pope MD and Belinda Conner MD    Reason for consultation: I was asked to see this patient for non-pharmacologic recommendations that may be of assistance for pain.     Assessment:  Jennifer is a 19 year old female patient with HbSS disease and complex PMH POD#7 s/p tonsillectomy and right elbow flexor lengthening, flexor pronator slide of wrist and finger & thumb adductor lengthening. She has a history of anxiety, asthma, and chronic low back pain who would benefit from non-pharmacologic interventions to help with symptom management and support overall treatment plan.    Plan:  1. Body pain and discomfort  -Massage provided to patient on LUE including hand, posterior neck, back, and bilateral lower extremities below knees, including feet. Massage provided using 2% lavender massage essential oil per patient request.    -Acupressure points Kidney 1, Liver 2, Liver 3, Stomach 43, Stomach 44 utilized indicated for calming and relaxation and support of internal organs.  -Psychoeducation provided during massage allowing patient to express her thoughts and feelings in regard to this admission and the stigma she feels is placed on her due to her skin color and diagnosis.     -Continued to discuss options for Integrative Health services; patient interested in continuing massage therapy services as well as trying laser acupoint or acupuncture, if possible, during this admission.     Follow-up:  We will continue to support during this admission as is clinically possible.     Thank you for this consultation. Please do not hesitate to contact me with any questions or concerns.     History of Present Illness: Jennifer Cervantes is a 19 year old female with HbSS disease on chronic transfusion program for secondary stroke prevention and complex PMH who is usually treated at [facility]. She was  "admitted 10/1 in preparation for for planned surgical procedures (10/2) with ENT -- Dr. Guy (tonsillectomy) and Ortho -- Dr. Franco ( s/p tonsillectomy and right elbow flexor lengthening, flexor pronator slide of wrist and finger & thumb adductor lengthening). Now POD#1 with need close monitoring of hemoglobin and pain control. She is not accompanied at this visit by any family members.    Jennifer reports to this provider that she likes to be called \"Kareen\", but it does not matter what she is called; she prefers this as she states it is easier to people to say than \"Jennifer\". Kareen reports she is having significant all-over body pain today, but the worst places are her neck, back, and legs. She is interested in trying massage therapy as she states this has helped in the past when she was seen at [facility] and worked with the Integrative Medicine team. She also reports feeling very tired and not sleeping very well. Lastly, she reports often feeling lonely and states it is difficult spending so much time in the hospital on her own as there are few friends or family members who come to visit her. Her sister is the only person who tries to visit her, and in Kareen's words, comes to see her, \"when she needs money from me\". Kareen expresses another challenge with being in the hospital is that, \" I look young so people think I am 15 or 16, and really I am 19\". She wants to be treated like a young adult but often feels as if she is treated like a child.     Review of systems: The Comprehensive ROS was performed and is negative except as noted below and in the HPI.    ALLERGIES:  Allergies   Allergen Reactions     Fish-Derived Products Hives     Seafood Hives     Diagnostic X-Ray Materials      PN: sickle cell     Fish Allergy      Gadolinium Other (See Comments)     Tongue swelling       IMMUNIZATIONS:  Immunization History   Administered Date(s) Administered     Influenza Vaccine IM > 6 months Valent IIV4 09/25/2019 "       CURRENT MEDICATIONS:  Current Facility-Administered Medications   Medication     acetaminophen (TYLENOL) tablet 650 mg     albuterol (PROAIR HFA/PROVENTIL HFA/VENTOLIN HFA) 108 (90 Base) MCG/ACT inhaler 2 puff     aspirin EC tablet 81 mg     azithromycin (ZITHROMAX) 250 mg in sodium chloride 0.9 % 250 mL intermittent infusion     cefTRIAXone (ROCEPHIN) 2 g vial to attach to  ml bag for ADULTS or NS 50 ml bag for PEDS     deferasirox (JADENU) tablet 900 mg - PATIENT SUPPLIED MEDICATION     dextrose 5% and 0.45% NaCl + KCl 20 mEq/L infusion     diazepam (VALIUM) tablet 2 mg     EPINEPHrine (ADRENALIN) kit 0.3 mg     erythromycin ethylsuccinate (ERYPED) suspension 400 mg     famotidine (PEPCID) tablet 20 mg     fluticasone-vilanterol (BREO ELLIPTA) 100-25 MCG/INH inhaler 1 puff     gabapentin (NEURONTIN) capsule 900 mg     heparin 100 UNIT/ML injection 5 mL     heparin lock flush 10 UNIT/ML injection 3-6 mL     heparin lock flush 10 UNIT/ML injection 3-6 mL     HYDROmorphone (DILAUDID) PCA 0.2 mg/mL OPIOID TOLERANT     hydroxyurea (HYDREA/DROXIA) suspension CHEMOTHERAPY 2,000 mg     hydrOXYzine (ATARAX) tablet 25 mg     ketorolac (TORADOL) injection 30 mg     lidocaine (LMX4) cream     lidocaine (XYLOCAINE) 2 % solution 5 mL     lidocaine 1 % 0.1-1 mL     naloxone (NARCAN) injection 0.1-0.4 mg     omeprazole (priLOSEC) CR capsule 40 mg     oxyCODONE (ROXICODONE) tablet 10 mg     phenol (CHLORASEPTIC) 1.4 % spray 1 mL     sennosides (SENOKOT) tablet 8.6 mg     sertraline (ZOLOFT) tablet 50 mg     sodium chloride (PF) 0.9% PF flush 0.2-10 mL     sodium chloride (PF) 0.9% PF flush 10 mL       PAST MEDICAL HISTORY:  Active Ambulatory Problems     Diagnosis Date Noted     Sickle cell disease (H) 11/22/2011     Moderate persistent asthma without complication 05/03/2019     Anxiety 05/03/2019     Constipation 05/03/2019     Resolved Ambulatory Problems     Diagnosis Date Noted     No Resolved Ambulatory Problems  "    Past Medical History:   Diagnosis Date     Anemia      Bleeding disorder (H)      Blood clotting disorder (H)      Cerebral infarction (H)      Depressive disorder      Migraines      Uncomplicated asthma        PAST SURGICAL HISTORY:  Past Surgical History:   Procedure Laterality Date     REPAIR TENDON ELBOW Right 10/2/2019    Procedure: Right Elbow Flexor Lengthening, Flexor Pronator Slide Of Wrist and Finger, Thumb Adductor Lengthening;  Surgeon: Anai Franco MD;  Location: [facility] OR     TONSILLECTOMY Bilateral 10/2/2019    Procedure: Bilateral Tonsillectomy;  Surgeon: Farhana Guy MD;  Location: [facility] OR       FAMILY HISTORY:  No family history on file.    SOCIAL HISTORY:  Social History     Patient does not qualify to have social determinant information on file (likely too young).   Social History Narrative     Not on file       Physical Exam:   Temp:  [98.4  F (36.9  C)-99  F (37.2  C)] 98.4  F (36.9  C)  Pulse:  [] 109  Resp:  [18-20] 20  BP: (114-125)/(64-79) 125/78  SpO2:  [96 %-100 %] 97 %  /78   Pulse 109   Temp 98.4  F (36.9  C) (Oral)   Resp 20   Wt 70.3 kg (154 lb 15.7 oz)   LMP  (LMP Unknown)   SpO2 97%   BMI 26.17 kg/m    Vitals:    10/02/19 0620 10/04/19 1603   Weight: 65.8 kg (145 lb 1 oz) 70.3 kg (154 lb 15.7 oz)        @  Wt Readings from Last 3 Encounters:   10/04/19 70.3 kg (154 lb 15.7 oz)   09/25/19 65.4 kg (144 lb 2.9 oz)   09/05/19 65.3 kg (144 lb)     Ht Readings from Last 2 Encounters:   09/25/19 1.639 m (5' 4.53\")   09/05/19 1.66 m (5' 5.35\")     Normalized BMI data available only for age 0 to 20 years.     GENERAL: Alert, no acute distress. Lying in bed with phone nearby. Placed phone to her side during this visit. Intermittently drowsy but remained talking throughout the duration of visit.  SKIN: No significant rash, abnormal pigmentation or lesions  HEAD: Normocephalic.   EYES: Closing eyes intermittently throughout visit but remained " talking.  NOSE: Nares without discharge.   MOUTH: Dry lips.  LUNGS: Unlabored respirations on room air. Nasal cannula resting on forehead during visit.  ABDOMEN: Not distended.  EXTREMITIES: Full range of motion LUE. Moving bilateral lower extremities independently. RUE in cast but patient still moving independently with slight movements.  PSYCHOLOGICAL: Appropriate mood.    Labs and Tests:  Results for orders placed or performed during the hospital encounter of 10/01/19 (from the past 24 hour(s))   Basic metabolic panel   Result Value Ref Range    Sodium 141 133 - 144 mmol/L    Potassium 4.3 3.4 - 5.3 mmol/L    Chloride 104 94 - 109 mmol/L    Carbon Dioxide 31 20 - 32 mmol/L    Anion Gap 6 3 - 14 mmol/L    Glucose 105 (H) 70 - 99 mg/dL    Urea Nitrogen 5 (L) 7 - 30 mg/dL    Creatinine 0.54 0.52 - 1.04 mg/dL    GFR Estimate >90 >60 mL/min/[1.73_m2]    GFR Estimate If Black >90 >60 mL/min/[1.73_m2]    Calcium 8.0 (L) 8.5 - 10.1 mg/dL   CBC with platelets differential   Result Value Ref Range    WBC 16.9 (H) 4.0 - 11.0 10e9/L    RBC Count 3.09 (L) 3.8 - 5.2 10e12/L    Hemoglobin 9.1 (L) 11.7 - 15.7 g/dL    Hematocrit 28.1 (L) 35.0 - 47.0 %    MCV 91 78 - 100 fl    MCH 29.4 26.5 - 33.0 pg    MCHC 32.4 31.5 - 36.5 g/dL    RDW 19.2 (H) 10.0 - 15.0 %    Platelet Count 225 150 - 450 10e9/L    Diff Method Automated Method     % Neutrophils 73.5 %    % Lymphocytes 16.3 %    % Monocytes 6.3 %    % Eosinophils 2.5 %    % Basophils 0.6 %    % Immature Granulocytes 0.8 %    Nucleated RBCs 2 (H) 0 /100    Absolute Neutrophil 12.4 (H) 1.6 - 8.3 10e9/L    Absolute Lymphocytes 2.8 0.8 - 5.3 10e9/L    Absolute Monocytes 1.1 0.0 - 1.3 10e9/L    Absolute Eosinophils 0.4 0.0 - 0.7 10e9/L    Absolute Basophils 0.1 0.0 - 0.2 10e9/L    Abs Immature Granulocytes 0.1 0 - 0.4 10e9/L    Absolute Nucleated RBC 0.4    Reticulocyte count   Result Value Ref Range    % Retic 3.9 (H) 0.5 - 2.0 %    Absolute Retic Unable to calculate, no RBC ordered  25 - 95 10e9/L        Time Spent on this Encounter   I, Radha Kerns, spent a total of 45 minutes bedside and on the inpatient unit today managing the care of Jennifer Cervantes. Over 50% of my time on the unit was spent counseling the patient on non-pharmacologic strategies for pain and coordinating care with primary team and bedside RNs before and during change of shift. See note for details.    EITAN Anne, CPNP  Pediatric Nurse Practitioner  Pediatric Integrative Health and Wellbeing, [facility]    CC  Patient Care Team:  Jeimy Decker as PCP - General (Pediatrics)  Jamaal Collins MD as Referring Physician (Pediatric Hematology/Oncology)

## 2019-10-09 NOTE — PROGRESS NOTES
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 Guy (tonsillectomy) and Ortho -- Dr. Franco (right elbow flexor pronator plasty, right elbow brachialis lengthening, right elbow biceps tenotomy, right thumb thenar release). Now POD#7 with new chest pain and infiltrates concerning for acute chest syndrome. Also still working on pain control and hydration.     Changes Today:  - dilaudid dropped to max of 3 bumps per hour (0.9)  - integrative medicine consult, appreciate recs  - encourage ambulation and incentive spirometry  - O2 for comfort  - Increased gabapentin to 900 mg TID given burning pain     Hematology  History of left MCA stroke  Hypercoagulability  History of TIA  - Continue home ASA 81 mg qDay     HbSS  - Continue home deferasirox 900 mg daily (if family brings from home)  - Continue home erythromycin 400 mg TID  - Continue home hydroxyurea 2000 mg daily-- will make a liquid today  - AM CBC with retic     Neuro  Chronic pain  Acute post-operative pain  - PACCT team consulted for dilaudid PCA guidance, appreciate recs  - PT consulted for early ambulation  - Hydromorphone PCA at basal rate 0.0, bumps 0.3, 1 hour max of 0.9 mg  - Oxycodone 10mg PO Q4H  - Scheduled tylenol q6h PO  - Toradol IV q6h  - Diazepam oral 2 mg q6h PRN for muscle spasms  - Continue gabapentin 900mg TID  - Continue Zoloft 50mg daily    ENT  S/p bilateral tonsillectomy  - 10 mg decadron IV x3 doses, now complete. Will do 5 days of prednisolone taper  - 2% viscous lidocaine rinses q2h prn  - Soft diet x 2 weeks  - ENT must be paged immediately  for any spitting up or coughing of blood     Resp  Asthma  - Continue home albuterol 2 puffs q4h prn   - On symbicort BID at home. Started on Breo Elipta 1 puff daily.  - PT consult placed and encouraged incentive spirometry  - O2 sats >94% on RA. O2 mask PRN for desaturations.    Acute chest syndrome vs. Infection  - Ceftriaxone 2 g q24h  - Azithromycin 500 mg x 1 followed by 250 mg q24h for total of 5 doses  - O2 for comfort     FEN/GI  - D5 1/2 NS + 20 KCl at 100 mL/hr  - Soft diet x 2 weeks  - Famotidine 20mg BID  - Miralax 17g daily  - Senokot 8.6mg daily     Access: Port  Disposition: Pending post-operative recovery    Disposition Plan   Expected discharge: 2 - 3 days, recommended to prior living arrangement once adequate pain management/ tolerating PO medications.    The patient's care was discussed with the Attending Physician, Dr. Alvarez, Bedside Nurse and Patient.    Tati Pope MD  Pediatric PGY3    Physician Attestation   I, Pedro Alvarez MD, saw this patient with the resident and agree with the resident/fellow's findings and plan of care as documented in the note.      I personally reviewed vital signs, medications and labs.    Key findings: I agree with the assessment as noted.    Pedro Alvarez MD  Date of Service (when I saw the patient): 10/9/19        Interval History   Pain at baseline per report, but was able to walk around the unit yesterday afternoon and again this morning. SOB somewhat improved, still using oxygen for comfort. Legs still look swollen, wondering why this is. No fevers.     Data reviewed today: I reviewed all medications, new labs and imaging results over the last 24 hours. I personally reviewed no images or EKG's today.    Physical Exam   Vital Signs: Temp: 98.4  F (36.9  C) Temp src: Oral BP: 125/78 Pulse: 109   Resp: 20 SpO2: 97 % O2 Device: None (Room air) Oxygen Delivery: 1 LPM  Weight: 154 lbs 15.73 oz     Constitutional: Appears sleepy (eyes  closed), however is sitting upright in bed and answering questions appropriately, cooperative, no apparent distress.   Eyes: EOMs grossly intact, sclera clear, conjunctiva normal  ENT: Normocephalic, without obvious abnormality, atraumatic, oral pharynx, granulation tissue, moist mucous membranes. No active bleeding.   Lymphatic: No significant cervical or supraclavicular lymphadenopathy.  Respiratory: Mild nasal flaring resolved, no increased WOB, normal RR, fair aeration, clear to auscultation bilaterally, no crackles or wheezing. Complains of pain at lower left lateral chest  Cardiovascular: Regular rate and rhythm, normal S1 and S2, left radial pulse normal, no significant edema  GI: Low to normal active bowel sounds, non-distended, non-tender, no hepatosplenomegaly  Extremities: Right arm immobilized in slight flexion  Neurologic: Awake, alert, oriented, cranial nerves grossly intact, neurovascularly intact    Data   Recent Labs   Lab 10/09/19  0630 10/08/19  0624 10/07/19  1051   WBC 16.9* 19.7* 19.2*   HGB 9.1* 9.1* 9.4*   MCV 91 91 91    237 216     --  143   POTASSIUM 4.3  --  3.2*   CHLORIDE 104  --  110*   CO2 31  --  27   BUN 5*  --  6*   CR 0.54  --  0.45*   ANIONGAP 6  --  6   MICAH 8.0*  --  7.6*   *  --  93     Medications     dextrose 5% and 0.45% NaCl + KCl 20 mEq/L 100 mL/hr at 10/09/19 0841     HYDROmorphone         acetaminophen  650 mg Oral Q6H     aspirin  81 mg Oral Daily     azithromycin  250 mg Intravenous Q24H     cefTRIAXone  2 g Intravenous Q24H     deferasirox  900 mg Oral Daily     erythromycin  400 mg Oral TID AC     famotidine  20 mg Oral BID     fluticasone-vilanterol  1 puff Inhalation Daily     gabapentin  900 mg Oral TID     heparin  5 mL Intracatheter Q28 Days     heparin lock flush  3-6 mL Intracatheter Q24H     hydroxyurea  2,000 mg Oral Daily     ketorolac  30 mg Intravenous Q6H     omeprazole  40 mg Oral Daily     oxyCODONE  10 mg Oral Q4H     sennosides   8.6 mg Oral BID     sertraline  50 mg Oral Daily     sodium chloride (PF)  10 mL Intracatheter Q28 Days

## 2019-10-09 NOTE — PROGRESS NOTES
10/09/19 1600   Functional Level Prior   Usual Activity Tolerance good   Current Activity Tolerance fair   Activity/Exercise/Self-Care Comment Patient with decreased mobility secondary to post op admission complicated by sickle cell pain crisis. Patient with previous walking program implemented by PT last week and patient was discharged from our services prior to onset of global pain from recent crisis.   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 Guy (tonsillectomy) and Ortho -- Dr. Franco (right elbow flexor pronator plasty, right elbow brachialis lengthening, right elbow biceps tenotomy, right thumb thenar release). Now POD#1 with need for IV hydration, close monitoring of hemoglobin, and pain control. Patient with continued admission from post op complicated by sickle cell pain crisis.    Parent/Caregiver Involvement Other (Comment)  (Not present)   Weight-Bearing Status - RUE nonweight-bearing   Pain Assessment   Patient Currently in Pain Yes, see Vital Sign flowsheet   Cognitive Status Examination   Orientation person   Level of Consciousness lethargic/somnolent   Follows Commands and Answers Questions 50% of the time   Personal Safety and Judgment at risk behaviors demonstrated;impulsive   Memory impaired   Behavior   Behavior cooperative   Range of Motion (ROM)   Range of Motion Range of Motion is limited   Cervical  Range of Motion  WFL   Trunk Range of Motion  WFL   Upper Extremity Range of Motion  R UE casted into slight extension   Lower Extremity Range of Motion  hypermobile ankles/feet with excessive pronation   Strength   Manual Muscle Testing Results Strength deficits identified   Cervical Strength  WFL   Trunk Strength  WFL   Upper Extremity Strength  R UE decreased secondary to pain and casting   Lower Extremity Strength  LE fatigue quickly   Muscle Tone Assessment   Muscle Tone  Right upper extremity tone abnormal;Right lower extremity tone abnormal;Left lower extremity tone abnormal   Transfer Skills and Mobility   Bed Mobility Comments Not observed on this date   Functional Motor Performance Gross Motor Skills   Coordination Deficits Identified   Functional Motor Performance-Higher Level Motor Skills   Higher Level Gross Motor Skill Comments Not appropriate to assess   Gait   Gait Comments CGA to close SBA; gait pattern reveals ankle hypermobility with R foot pronation > L. Patient with shuffling gait, minimal foot clearance and short step length   Balance   Balance Comments Impaired in standing, sitting balance good   General Therapy Interventions   Planned Therapy Interventions Therapeutic Activities   Clinical Impression   Criteria for Skilled Interventions Met (PT) yes;skilled treatment is necessary   PT Diagnosis (PT) impaired mobility with ambulation and increased pain limiting function secondary to s/p tendon lengthening complicated by sickle cell pain crisis   Functional limitations due to impairments impaired mobility;pain   Clinical Presentation Evolving/Changing   Clinical Presentation Rationale Greater than 3 body structural/functional impairments requiring moderately complex decision making to treat   Clinical Decision Making (Complexity) Moderate complexity   Therapy Frequency Daily   Predicted Duration of Therapy Intervention (PT) 1 week   Anticipated Discharge Disposition home w/ assist   Risk &  Benefits of therapy have been explained Yes   Patient, Family & other staff in agreement with plan of care Yes   Clinical Impression Comments Jennifer presents s/p R UE surgery complicated by sickle cell pain crisis. Patient with increased pain and decreased mobility as a result, requiring skilled inpatient PT to progress functional mobility towards PLOF for safe discharge.   Total Evaluation Time   Total Evaluation Time (Minutes) 8       Pavithra Pagan, PT, DPT

## 2019-10-09 NOTE — PLAN OF CARE
Complaining of pain all over.  Rating at 10 at the end of the shift. Given scheduled tylenol and oxycodone and toradol and using PCA pumps when awakened.  Up to the bathroom urgently x 2. Steady on feet. Getting out of bed with minimal assistance. Appeared slightly drowsy but alert and answered questions appropriately and promptly.  Minimal oral intake. No stool. Lungs clear.  Requesting warm blankets times during night. Afebrile.

## 2019-10-10 ENCOUNTER — APPOINTMENT (OUTPATIENT)
Dept: PHYSICAL THERAPY | Facility: CLINIC | Age: 19
DRG: 500 | End: 2019-10-10
Attending: PEDIATRICS
Payer: COMMERCIAL

## 2019-10-10 ENCOUNTER — APPOINTMENT (OUTPATIENT)
Dept: GENERAL RADIOLOGY | Facility: CLINIC | Age: 19
DRG: 500 | End: 2019-10-10
Attending: PEDIATRICS
Payer: COMMERCIAL

## 2019-10-10 LAB
ABO + RH BLD: NORMAL
ABO + RH BLD: NORMAL
BASOPHILS # BLD AUTO: 0.1 10E9/L (ref 0–0.2)
BASOPHILS NFR BLD AUTO: 0.6 %
BLD GP AB SCN SERPL QL: NORMAL
BLD PROD TYP BPU: NORMAL
BLD PROD TYP BPU: NORMAL
BLD UNIT ID BPU: 0
BLOOD BANK CMNT PATIENT-IMP: NORMAL
BLOOD PRODUCT CODE: NORMAL
BPU ID: NORMAL
DIFFERENTIAL METHOD BLD: ABNORMAL
EOSINOPHIL # BLD AUTO: 0.5 10E9/L (ref 0–0.7)
EOSINOPHIL NFR BLD AUTO: 2.8 %
ERYTHROCYTE [DISTWIDTH] IN BLOOD BY AUTOMATED COUNT: 19.7 % (ref 10–15)
HCT VFR BLD AUTO: 25.6 % (ref 35–47)
HGB BLD-MCNC: 8.4 G/DL (ref 11.7–15.7)
IMM GRANULOCYTES # BLD: 0.2 10E9/L (ref 0–0.4)
IMM GRANULOCYTES NFR BLD: 1.2 %
LYMPHOCYTES # BLD AUTO: 2.4 10E9/L (ref 0.8–5.3)
LYMPHOCYTES NFR BLD AUTO: 12.9 %
MCH RBC QN AUTO: 29.8 PG (ref 26.5–33)
MCHC RBC AUTO-ENTMCNC: 32.8 G/DL (ref 31.5–36.5)
MCV RBC AUTO: 91 FL (ref 78–100)
MONOCYTES # BLD AUTO: 1.2 10E9/L (ref 0–1.3)
MONOCYTES NFR BLD AUTO: 6.6 %
NEUTROPHILS # BLD AUTO: 14 10E9/L (ref 1.6–8.3)
NEUTROPHILS NFR BLD AUTO: 75.9 %
NRBC # BLD AUTO: 0.3 10*3/UL
NRBC BLD AUTO-RTO: 2 /100
NUM BPU REQUESTED: 1
PLATELET # BLD AUTO: 231 10E9/L (ref 150–450)
RBC # BLD AUTO: 2.82 10E12/L (ref 3.8–5.2)
SPECIMEN EXP DATE BLD: NORMAL
TRANSFUSION STATUS PATIENT QL: NORMAL
TRANSFUSION STATUS PATIENT QL: NORMAL
WBC # BLD AUTO: 18.4 10E9/L (ref 4–11)

## 2019-10-10 PROCEDURE — 25800030 ZZH RX IP 258 OP 636: Performed by: PEDIATRICS

## 2019-10-10 PROCEDURE — 86902 BLOOD TYPE ANTIGEN DONOR EA: CPT | Performed by: PEDIATRICS

## 2019-10-10 PROCEDURE — 25000132 ZZH RX MED GY IP 250 OP 250 PS 637: Performed by: STUDENT IN AN ORGANIZED HEALTH CARE EDUCATION/TRAINING PROGRAM

## 2019-10-10 PROCEDURE — 86900 BLOOD TYPING SEROLOGIC ABO: CPT | Performed by: PEDIATRICS

## 2019-10-10 PROCEDURE — 71045 X-RAY EXAM CHEST 1 VIEW: CPT

## 2019-10-10 PROCEDURE — 25000132 ZZH RX MED GY IP 250 OP 250 PS 637: Performed by: PEDIATRICS

## 2019-10-10 PROCEDURE — 25800030 ZZH RX IP 258 OP 636: Performed by: STUDENT IN AN ORGANIZED HEALTH CARE EDUCATION/TRAINING PROGRAM

## 2019-10-10 PROCEDURE — 12000014 ZZH R&B PEDS UMMC

## 2019-10-10 PROCEDURE — 86923 COMPATIBILITY TEST ELECTRIC: CPT | Performed by: PEDIATRICS

## 2019-10-10 PROCEDURE — 99233 SBSQ HOSP IP/OBS HIGH 50: CPT | Performed by: NURSE PRACTITIONER

## 2019-10-10 PROCEDURE — 86901 BLOOD TYPING SEROLOGIC RH(D): CPT | Performed by: PEDIATRICS

## 2019-10-10 PROCEDURE — 86850 RBC ANTIBODY SCREEN: CPT | Performed by: PEDIATRICS

## 2019-10-10 PROCEDURE — 85025 COMPLETE CBC W/AUTO DIFF WBC: CPT | Performed by: STUDENT IN AN ORGANIZED HEALTH CARE EDUCATION/TRAINING PROGRAM

## 2019-10-10 PROCEDURE — P9016 RBC LEUKOCYTES REDUCED: HCPCS | Performed by: PEDIATRICS

## 2019-10-10 PROCEDURE — 36592 COLLECT BLOOD FROM PICC: CPT | Performed by: STUDENT IN AN ORGANIZED HEALTH CARE EDUCATION/TRAINING PROGRAM

## 2019-10-10 PROCEDURE — 25000128 H RX IP 250 OP 636: Performed by: STUDENT IN AN ORGANIZED HEALTH CARE EDUCATION/TRAINING PROGRAM

## 2019-10-10 PROCEDURE — 97530 THERAPEUTIC ACTIVITIES: CPT | Mod: GP

## 2019-10-10 RX ORDER — CELECOXIB 200 MG/1
200 CAPSULE ORAL DAILY
Status: DISCONTINUED | OUTPATIENT
Start: 2019-10-10 | End: 2019-10-11

## 2019-10-10 RX ORDER — OXYCODONE HYDROCHLORIDE 5 MG/1
10 TABLET ORAL EVERY 6 HOURS
Status: DISCONTINUED | OUTPATIENT
Start: 2019-10-10 | End: 2019-10-10

## 2019-10-10 RX ORDER — NALOXONE HYDROCHLORIDE 0.4 MG/ML
.1-.4 INJECTION, SOLUTION INTRAMUSCULAR; INTRAVENOUS; SUBCUTANEOUS
Status: DISCONTINUED | OUTPATIENT
Start: 2019-10-10 | End: 2019-10-13 | Stop reason: HOSPADM

## 2019-10-10 RX ORDER — OXYCODONE HYDROCHLORIDE 5 MG/1
10 TABLET ORAL EVERY 4 HOURS PRN
Status: DISCONTINUED | OUTPATIENT
Start: 2019-10-10 | End: 2019-10-11

## 2019-10-10 RX ORDER — OXYCODONE HYDROCHLORIDE 5 MG/1
10 TABLET ORAL EVERY 4 HOURS
Status: DISCONTINUED | OUTPATIENT
Start: 2019-10-10 | End: 2019-10-11

## 2019-10-10 RX ADMIN — CEFTRIAXONE SODIUM 2 G: 2 INJECTION, POWDER, FOR SOLUTION INTRAMUSCULAR; INTRAVENOUS at 11:39

## 2019-10-10 RX ADMIN — KETOROLAC TROMETHAMINE 30 MG: 30 INJECTION, SOLUTION INTRAMUSCULAR; INTRAVENOUS at 00:35

## 2019-10-10 RX ADMIN — FAMOTIDINE 20 MG: 20 TABLET ORAL at 08:35

## 2019-10-10 RX ADMIN — ACETAMINOPHEN 650 MG: 325 TABLET, FILM COATED ORAL at 20:30

## 2019-10-10 RX ADMIN — ACETAMINOPHEN 650 MG: 325 TABLET, FILM COATED ORAL at 04:15

## 2019-10-10 RX ADMIN — SENNOSIDES 8.6 MG: 8.6 TABLET, FILM COATED ORAL at 20:30

## 2019-10-10 RX ADMIN — ERYTHROMYCIN ETHYLSUCCINATE 400 MG: 400 SUSPENSION ORAL at 07:21

## 2019-10-10 RX ADMIN — ASPIRIN 81 MG: 81 TABLET ORAL at 08:35

## 2019-10-10 RX ADMIN — OXYCODONE HYDROCHLORIDE 10 MG: 5 TABLET ORAL at 15:20

## 2019-10-10 RX ADMIN — FLUTICASONE FUROATE AND VILANTEROL TRIFENATATE 1 PUFF: 100; 25 POWDER RESPIRATORY (INHALATION) at 08:00

## 2019-10-10 RX ADMIN — OXYCODONE HYDROCHLORIDE 10 MG: 5 TABLET ORAL at 14:19

## 2019-10-10 RX ADMIN — OXYCODONE HYDROCHLORIDE 10 MG: 5 TABLET ORAL at 18:28

## 2019-10-10 RX ADMIN — HYDROXYZINE HYDROCHLORIDE 25 MG: 25 TABLET, FILM COATED ORAL at 01:09

## 2019-10-10 RX ADMIN — OXYCODONE HYDROCHLORIDE 10 MG: 5 TABLET ORAL at 05:56

## 2019-10-10 RX ADMIN — GABAPENTIN 900 MG: 300 CAPSULE ORAL at 20:31

## 2019-10-10 RX ADMIN — DEXTRAN 70, AND HYPROMELLOSE 2910 2 DROP: 1; 3 SOLUTION/ DROPS OPHTHALMIC at 20:35

## 2019-10-10 RX ADMIN — ACETAMINOPHEN 650 MG: 325 TABLET, FILM COATED ORAL at 14:19

## 2019-10-10 RX ADMIN — SENNOSIDES 8.6 MG: 8.6 TABLET, FILM COATED ORAL at 08:35

## 2019-10-10 RX ADMIN — OXYCODONE HYDROCHLORIDE 10 MG: 5 TABLET ORAL at 22:37

## 2019-10-10 RX ADMIN — OXYCODONE HYDROCHLORIDE 10 MG: 5 TABLET ORAL at 11:06

## 2019-10-10 RX ADMIN — ERYTHROMYCIN ETHYLSUCCINATE 400 MG: 400 SUSPENSION ORAL at 17:14

## 2019-10-10 RX ADMIN — AZITHROMYCIN MONOHYDRATE 250 MG: 500 INJECTION, POWDER, LYOPHILIZED, FOR SOLUTION INTRAVENOUS at 12:16

## 2019-10-10 RX ADMIN — Medication 2000 MG: at 08:48

## 2019-10-10 RX ADMIN — ERYTHROMYCIN ETHYLSUCCINATE 400 MG: 400 SUSPENSION ORAL at 11:40

## 2019-10-10 RX ADMIN — KETOROLAC TROMETHAMINE 30 MG: 30 INJECTION, SOLUTION INTRAMUSCULAR; INTRAVENOUS at 05:56

## 2019-10-10 RX ADMIN — GABAPENTIN 900 MG: 300 CAPSULE ORAL at 08:35

## 2019-10-10 RX ADMIN — CELECOXIB 200 MG: 200 CAPSULE ORAL at 11:06

## 2019-10-10 RX ADMIN — Medication: at 00:04

## 2019-10-10 RX ADMIN — OMEPRAZOLE 40 MG: 20 CAPSULE, DELAYED RELEASE ORAL at 08:34

## 2019-10-10 RX ADMIN — ACETAMINOPHEN 650 MG: 325 TABLET, FILM COATED ORAL at 08:35

## 2019-10-10 RX ADMIN — POTASSIUM CHLORIDE, DEXTROSE MONOHYDRATE AND SODIUM CHLORIDE: 150; 5; 450 INJECTION, SOLUTION INTRAVENOUS at 20:35

## 2019-10-10 RX ADMIN — GABAPENTIN 900 MG: 300 CAPSULE ORAL at 14:19

## 2019-10-10 RX ADMIN — SERTRALINE HYDROCHLORIDE 50 MG: 50 TABLET ORAL at 08:35

## 2019-10-10 RX ADMIN — POTASSIUM CHLORIDE, DEXTROSE MONOHYDRATE AND SODIUM CHLORIDE: 150; 5; 450 INJECTION, SOLUTION INTRAVENOUS at 06:25

## 2019-10-10 RX ADMIN — FAMOTIDINE 20 MG: 20 TABLET ORAL at 20:31

## 2019-10-10 RX ADMIN — OXYCODONE HYDROCHLORIDE 10 MG: 5 TABLET ORAL at 01:40

## 2019-10-10 RX ADMIN — OXYCODONE HYDROCHLORIDE 10 MG: 5 TABLET ORAL at 20:29

## 2019-10-10 NOTE — PROGRESS NOTES
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 who was admitted 10/1 in preparation for for planned surgical procedures (10/2) with ENT -- Dr. Guy (tonsillectomy) and Ortho -- Dr. Franco (right elbow flexor pronator plasty, right elbow brachialis lengthening, right elbow biceps tenotomy, right thumb thenar release). Now POD#8 with chest pain and infiltrates concerning for acute chest syndrome. Hgb dropping so will transfuse. Also still working on pain control and hydration.     Changes Today:  - continue current pain regimen, appreciate ongoing PACCT recs  - transfuse PRBCs x 5 mL/kg  - repeat CXR now, CBC in AM  - discontinue celebrex, restart toradol     Hematology  History of left MCA stroke  Hypercoagulability  History of TIA  - Continue home ASA 81 mg qDay     HbSS  - Continue home deferasirox 900 mg daily (if family brings from home)  - Continue home erythromycin 400 mg TID  - Continue home hydroxyurea 2000 mg daily  - AM CBC with retic     Neuro  Chronic pain  Acute post-operative pain  - PACCT team consulted for dilaudid PCA guidance, appreciate recs  - PT consulted for early ambulation  - Hydromorphone PCA at basal rate 0.0, bumps 0.3, 1 hour max of 0.9 mg  - Oxycodone 10mg PO Q4H  - Scheduled tylenol q6h PO  - Celebrex qDay  - Diazepam oral 2 mg q6h PRN for muscle spasms  - Continue gabapentin 900mg TID  - Continue Zoloft 50mg daily    ENT  S/p bilateral tonsillectomy  - 10 mg decadron IV x3 doses, now complete. Will do 5 days of prednisolone taper  - 2% viscous lidocaine rinses q2h prn  - Soft diet x 2 weeks  - ENT must be paged immediately for any spitting up or coughing of blood     Resp  Asthma  -  "Continue home albuterol 2 puffs q4h prn   - On symbicort BID at home. Started on Breo Elipta 1 puff daily.  - PT consult placed and encouraged incentive spirometry  - O2 sats >94% on RA. O2 mask PRN for desaturations.    Acute chest syndrome vs. Infection  - Ceftriaxone 2 g q24h  - Azithromycin 500 mg x 1 followed by 250 mg q24h for total of 5 doses  - O2 for comfort     FEN/GI  - D5 1/2 NS + 20 KCl at 100 mL/hr  - Soft diet x 2 weeks  - Famotidine 20mg BID  - Miralax 17g daily  - Senokot 8.6mg daily     Access: Port  Disposition: Pending post-operative recovery    Disposition Plan   Expected discharge: 2 - 3 days, recommended to prior living arrangement once adequate pain management/ tolerating PO medications.    The patient's care was discussed with Dr. Arely Pope MD  Pediatric PGY3    Physician Attestation   I, Arely Mcgill MD, MD, saw this patient with the resident and agree with the resident/fellow's findings and plan of care as documented in the note.      I personally reviewed vital signs, medications, labs and imaging.    Arely Mcgill MD, MD  Date of Service (when I saw the patient): 10/10/19    Interval History   Increased pain in chest, left side, back and abdomen today requiring frequent bumps of PCA. Does not feel ready to decrease bumps. \"I just don't feel well.\" Tmax 99.9. Atarax x 1 overnight. PO nearly 800 mL yesterday. She is stooling daily per report, though this is not charted.     Data reviewed today: I reviewed all medications, new labs and imaging results over the last 24 hours. I personally reviewed no images or EKG's today.    Physical Exam   Vital Signs: Temp: 98.9  F (37.2  C) Temp src: Oral BP: 127/71 Pulse: 109 Heart Rate: 103 Resp: 18 SpO2: 95 % O2 Device: None (Room air) Oxygen Delivery: 1 LPM  Weight: 158 lbs 4.64 oz     Constitutional: Appears sleepy (eyes closed), however is sitting upright in bed and answering questions appropriately, " cooperative, no apparent distress.   Eyes: EOMs grossly intact, sclera clear, conjunctiva normal  ENT: Normocephalic, without obvious abnormality, atraumatic, oral pharynx, granulation tissue, moist mucous membranes. No active bleeding.   Lymphatic: No significant cervical or supraclavicular lymphadenopathy.  Respiratory: Mild nasal flaring resolved, no increased WOB, normal RR, fair aeration, clear to auscultation bilaterally, no crackles or wheezing. Complains of pain at lower left lateral chest  Cardiovascular: Regular rate and rhythm, normal S1 and S2, left radial pulse normal, no significant edema  GI: Low to normal active bowel sounds, mild distended and TTP, mild hepatosplenomegaly  Extremities: Right arm immobilized in slight flexion  Neurologic: Awake, alert, oriented, cranial nerves grossly intact, neurovascularly intact    Data   Recent Labs   Lab 10/10/19  0559 10/09/19  0630 10/08/19  0624 10/07/19  1051   WBC 18.4* 16.9* 19.7* 19.2*   HGB 8.4* 9.1* 9.1* 9.4*   MCV 91 91 91 91    225 237 216   NA  --  141  --  143   POTASSIUM  --  4.3  --  3.2*   CHLORIDE  --  104  --  110*   CO2  --  31  --  27   BUN  --  5*  --  6*   CR  --  0.54  --  0.45*   ANIONGAP  --  6  --  6   MICAH  --  8.0*  --  7.6*   GLC  --  105*  --  93     Medications     dextrose 5% and 0.45% NaCl + KCl 20 mEq/L 100 mL/hr at 10/10/19 0700     HYDROmorphone         acetaminophen  650 mg Oral Q6H     aspirin  81 mg Oral Daily     azithromycin  250 mg Intravenous Q24H     cefTRIAXone  2 g Intravenous Q24H     celecoxib  200 mg Oral Daily     deferasirox  900 mg Oral Daily     erythromycin  400 mg Oral TID AC     famotidine  20 mg Oral BID     fluticasone-vilanterol  1 puff Inhalation Daily     gabapentin  900 mg Oral TID     heparin  5 mL Intracatheter Q28 Days     heparin lock flush  3-6 mL Intracatheter Q24H     hydroxyurea  2,000 mg Oral Daily     omeprazole  40 mg Oral Daily     oxyCODONE  10 mg Oral Q4H     sennosides  8.6 mg  Oral BID     sertraline  50 mg Oral Daily     sodium chloride (PF)  10 mL Intracatheter Q28 Days

## 2019-10-10 NOTE — PROGRESS NOTES
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 Pain is currently localized to left chest and back, and these are now more bothersome than her operative arm and her throat. Despite high pain ratings on NRS, patient has been reported as calm and cooperative when awake which is different than her reported typical demeanor with poor pain control (self-described as angry and agitated). Continuing to work to find balance between adequate pain control and sedation.      Diagnosis  Types and sources of pain:  - Nociceptive: acute post operative pain secondary to above procedures, left chest ?ACS  - Neuropathic: nerve involvement in surgery, potential exists - new report of \"burning\" pain in bilateral arms  - Psycho-social-spiritual-emotional: fear of stigma and worry around being identified as \"drug-seeking\" in an unfamiliar facility compounds anxiety, which may negatively impact pain  - Chronic: history of multiple sickle cell crises per year; chronic back pain, " improving after breast reduction late spring/early summer - worried about increased back pain over the past few days  Advance care planning:   - Not appropriate to address at this visit. Assessments will be ongoing.  Discharge planning: TBD    Recommendations    POSTOPERATIVE PAIN MANAGEMENT AND RECOMMENDATIONS:   Simple Analgesia:  - Acetaminophen 15 mg/kg po Q6h scheduled.  - celecoxib 200 mg po once daily (previously on 100 mg po twice daily, changed to one dose due to T&A; could change back if increased pain in the evening)     Opioid Analgesia:(continuing PCA today given concern for VOC)  - oxycodone 10 mg po Q4h scheduled in place of IV PCA infusion rate, leave PCA doses available.    - If increased baseline sedation, either decrease to 7.5 mg po Q4h or space doses: 10 mg po Q6h  - hydromorphone IV PCA as follows:   PCA dose: 0.3 mg  Lockout interval: 15 minutes  Continuous rate: 0 mg/hr  One hour dose limit: 0.9 mg (i.e. 3 boluses allowed in 1 hour)  - if sedation post PCA doses, consider decreasing to 0.2-0.25 mg/dose.  - if inadequate analgesia AND patient is not too sedate, return lockout interval to Q10 minutes, keeping hourly total the same  - When ready to transition off PCA:  Option 1: oxycodone 10 mg po Q4h scheduled + Q4h PRN for moderate to severe breakthrough pain (doses must be given 2 hours apart from one another)  Option 2: (if concern for sedation) oxycodone 10 mg po Q6h scheduled + Q4h PRN for moderate to severe breakthrough pain (doses must be given 2 hours apart from one another)  - Ultimately, would plan for discharge with oxycodone 10 mg po Q4h PRN and instructions to reduce by one dose per day (ex: 6, 5, 4, 3, 2, 1, off)    ADJUVANT ANALGESIA  - Gabapentin 900 mg TID (temporary increase due to neuropathic pain). Once off opioids, recommend tapering back to prior dose as follows:  - 600 mg po in the morning, 900 mg po in the afternoon and evening x2 days, then  - 600 mg po in the morning  "and afternoon, 900 mg po in the evening x2 days, then  - 600 mg po TID  - Diazepam 2 mg po Q6h PRN muscle spasms - discontinue tomorrow if patient is not using this  - hydroxyzine 25 mg IV/po Q6h PRN pain as an opioid-sparing adjuvant  - lidocaine swish/gargle per ENT vs chloraseptic spray   - declines lidocaine patches     ADVERSE REACTION MANAGEMENT  Constipation: PLEASE SCHEDULE as soon as able to take oral medications:  \"Mush\": Start polyethylene glycol 3350, 17g daily  \"Push\": Start senna, 5 mls or 1 tab HS  \"Uncorking\": Consider dulcolax suppository or Fleet enema if no stool in 24 hours.  Pruritus: If opioid-induced pruritus, would start naloxone continuous infusion at 0.5 mcg/kg/hr.  Can titrate upwards (usually done in 0.5 mcg/kg/hr increments) till a maximum dose of 2 mcg/kg/hr is reached.  If pruritus is still a distressing symptom at maximum doses of naloxone, contact the PACCT provider on call for assistance with an opioid rotation.  Note that opioid-induced pruritus is NOT a histamine-mediated reaction, therefore antihistamines (such as diphenhydramine/Benadryl ) are generally ineffective in resolving this symptom.  Nausea/Vomiting: recommend PRN ondansetron as first line treatment     Non-medication strategies:   - Child Life Specialist, mental health provider, and/or caregiver support, when appropriate and available   - provided patient with notepad and marker as a communication aid   - encourage cold soft foods for anesthetic benefit (ex: popsicles, sherbet, etc)   - Allow choices where permitted, positioning, rapport builiding   - Cognitive: auditory stimuli (e.g. tablets), control, controlled breathing, distraction, imagery, hypnosis (by trained provider only), modeling, relaxation, prepare for coping techniques and/or teaching procedures, relaxation   - Biophysical: environmental modification, holding, touching, massage, heat or cold application   - Distraction: music, TV, video games, guided " imagery, deep breathing, conversation  Other considerations: Older patients may or may not want caregiver presence. Establish rapport to increase cooperation. Allow to watch procedure, if requested    The above recommendations are based on the WHO Guidelines for the Pharmacological Treatment of Persisting Pain in Children with Medical Illnesses: (1) using a two-step strategy, (2) dosing at regular intervals, (3) using the appropriate route of administration, and (4) adapting treatment to the individual child (available at: http://apps.who.int/iris/bitstream/35717/77941/1/9789241548120_Guidelines.pdf).    Thank you for the opportunity to participate in the care of Jennifer and her family.  Please contact the Pain and Advanced/Complex Care Team (PACCT) with any emergent needs via text page to the PACCT general pager ([phone], answered 8-4:30 Monday to Friday).  After 4:30 pm and on weekends/holidays, please refer to the [facility] or [place] on-call schedule.    The above assessment and recommendations were discussed with Jennifer's care team.  All parties involved agree with the above recommendations.  A total of 35 minutes were spent face-to-face with and in the coordination of care of Jennifer Cervantes.  Greater than 50% of my time on the unit was spent counseling the patient and/or coordinating care.    Echo Iniguez, NP, APRN CNP   Pain and Advanced/Complex Care Team (PACCT)  [facility]  PACCT Pager: [phone]    SUBJECTIVE: Interim History  Reports increased discomfort today, localized to back and lateral chest. PRBC transfusion planned. Requesting massage today, possibly interested in acupoint therapy    OBJECTIVE: Last 24 hours (from 08:00 yesterday morning to 08:00 this morning)  VITALS: Reviewed; all vital signs were within normal limits for age.  INS/OUTS:   Taking PO? yes  Bowel movements? yes (Last documented bowel movement: 10/8, soft)  PAIN (0-10 Numeric Pain  Rating Scale, with 10 being the worst pain imaginable): 8-10/10 (left chest, back, right arm, throat)    Current Medications  I have reviewed this Jennifer's medication profile and medications during this hospitalization.    Current Facility-Administered Medications   Medication     acetaminophen (TYLENOL) tablet 650 mg     albuterol (PROAIR HFA/PROVENTIL HFA/VENTOLIN HFA) 108 (90 Base) MCG/ACT inhaler 2 puff     aspirin EC tablet 81 mg     azithromycin (ZITHROMAX) 250 mg in sodium chloride 0.9 % 250 mL intermittent infusion     cefTRIAXone (ROCEPHIN) 2 g vial to attach to  ml bag for ADULTS or NS 50 ml bag for PEDS     deferasirox (JADENU) tablet 900 mg - PATIENT SUPPLIED MEDICATION     dextrose 5% and 0.45% NaCl + KCl 20 mEq/L infusion     diazepam (VALIUM) tablet 2 mg     EPINEPHrine (ADRENALIN) kit 0.3 mg     erythromycin ethylsuccinate (ERYPED) suspension 400 mg     famotidine (PEPCID) tablet 20 mg     fluticasone-vilanterol (BREO ELLIPTA) 100-25 MCG/INH inhaler 1 puff     gabapentin (NEURONTIN) capsule 900 mg     heparin 100 UNIT/ML injection 5 mL     heparin lock flush 10 UNIT/ML injection 3-6 mL     heparin lock flush 10 UNIT/ML injection 3-6 mL     HYDROmorphone (DILAUDID) PCA 0.2 mg/mL OPIOID TOLERANT     hydroxyurea (HYDREA/DROXIA) suspension CHEMOTHERAPY 2,000 mg     hydrOXYzine (ATARAX) tablet 25 mg     ketorolac (TORADOL) injection 30 mg     lidocaine (LMX4) cream     lidocaine (XYLOCAINE) 2 % solution 5 mL     lidocaine 1 % 0.1-1 mL     naloxone (NARCAN) injection 0.1-0.4 mg     omeprazole (priLOSEC) CR capsule 40 mg     oxyCODONE (ROXICODONE) tablet 10 mg     phenol (CHLORASEPTIC) 1.4 % spray 1 mL     sennosides (SENOKOT) tablet 8.6 mg     sertraline (ZOLOFT) tablet 50 mg     sodium chloride (PF) 0.9% PF flush 0.2-10 mL     sodium chloride (PF) 0.9% PF flush 10 mL     Medications related to this visit are as follows (with PRN use indicated from 08:00 yesterday morning to 08:00 this morning):    Medications related to Pain Management (From now, onward)    Start     Dose/Rate Route Frequency Ordered Stop    10/09/19 1400  gabapentin (NEURONTIN) capsule 900 mg      900 mg Oral 3 TIMES DAILY 10/09/19 1126      10/09/19 1130  HYDROmorphone (DILAUDID) PCA 0.2 mg/mL OPIOID TOLERANT       Intravenous CONTINUOUS 10/09/19 1128      10/08/19 1200  ketorolac (TORADOL) injection 30 mg      30 mg  over 5 Minutes Intravenous EVERY 6 HOURS 10/08/19 0914 10/13/19 1159    10/07/19 1020  oxyCODONE (ROXICODONE) tablet 10 mg      10 mg Oral EVERY 4 HOURS 10/07/19 0952      10/07/19 0959  diazepam (VALIUM) tablet 2 mg      2 mg Oral EVERY 6 HOURS PRN 10/07/19 0959      10/06/19 2000  sennosides (SENOKOT) tablet 8.6 mg      8.6 mg Oral 2 TIMES DAILY 10/06/19 0945      10/06/19 0716  hydrOXYzine (ATARAX) tablet 25 mg      25 mg Oral EVERY 6 HOURS PRN 10/06/19 0716      10/05/19 2100  acetaminophen (TYLENOL) tablet 650 mg      650 mg Oral EVERY 6 HOURS 10/05/19 1424      10/02/19 0700  aspirin EC tablet 81 mg      81 mg Oral DAILY 10/01/19 1605      10/01/19 1456  lidocaine 1 % 0.1-1 mL      0.1-1 mL Other EVERY 1 HOUR PRN 10/01/19 1501      10/01/19 1456  lidocaine (LMX4) cream       Topical EVERY 1 HOUR PRN 10/01/19 1501        - PRN hydroxyzine x1    Hydromorphone PCA:    - hydromorphone continuous infusion + PCA:  PCA dose: 0.3 mg  Lockout interval: 15 minutes  Continuous rate: 0 mg  One hour dose limit: 0.9 mg (i.e. 3 boluses allowed in 1 hour)  24-Hour PCA Use (last 24 hours; ending times noted in parentheses) & History:     Delivered Denied Amount (mg)   Day (15:00) See below See below 3.9   Evening (22:01) 18 6 1.5   Midnight (00:02) 2 3 0.6   Overnight (06:29) 6 7 1.2   24h Total 26 16 7.2   History (in mg, ending am of date below):  9-Oct 7   8-Oct    7-Oct *   6-Oct 7.8   5-Oct 5.03*   4-Oct 12.34   3-Oct 8.23     Physical Examination  Awake, alert. Uncomfortable-appearing.  Normocephalic, nasal cannula in place  Mild  tachypnea at rest. LCTAB  Abdomen soft, round, nontender  Right arm in cast. DUARTE. Bilateral LE edema, nonpitting    Laboratory/Imaging/Pathology  CHEMISTRY  AST   Date Value Ref Range Status   10/01/2019 47 (H) 0 - 45 U/L Final     ALT   Date Value Ref Range Status   10/01/2019 47 0 - 50 U/L Final     Creatinine   Date Value Ref Range Status   10/09/2019 0.54 0.52 - 1.04 mg/dL Final       Estimated Creatinine Clearance: 163.4 mL/min (based on SCr of 0.54 mg/dL).    HEMATOLOGY  Platelet Count   Date Value Ref Range Status   10/10/2019 231 150 - 450 10e9/L Final     WBC   Date Value Ref Range Status   10/10/2019 18.4 (H) 4.0 - 11.0 10e9/L Final     Hemoglobin   Date Value Ref Range Status   10/10/2019 8.4 (L) 11.7 - 15.7 g/dL Final     All other laboratory values, medical imaging and pathology reports acquired/resulted in the last 24 hours were reviewed.  Refer to the EMR for any details not referenced above.

## 2019-10-10 NOTE — PROGRESS NOTES
AVSS. Continues to be drowsy, but arousable and oriented. MD aware. Good PO intake food. Fluid intake: fair. No n/v. Walked in cardona 1x. Rating pain 9-10/10. Complained of it feeling worse tonight. MD notified. If continues to complain of pain give atarax. If that doesn't help lockout rate can be adjusted. Bed bath, linen change, and line cleaning done. Pt.updated on plan of care. Emotional support given. Will continue to monitor & update MD.

## 2019-10-10 NOTE — PROGRESS NOTES
"  Pediatric Integrative Medicine Subsequent Visit      Primary Care Provider: Jeimy Decker  Consulting Provider: Arely Mcgill MD    Reason for consultation: I was asked to see this patient for non-pharmacologic pain and anxiety management.    Assessment:  Jennifer is a 19 year old female patient with HgbSS and post-op for orthopedic and ENT procedures, now with acute on chronic back pain and sx c/w acute chest.      Plan:  1.Nutrition/Supplements- please check vitamin D level from [facility] to see if supplement is necessary.     2. Physical Activity-continue working with PT for post-op mobilization    3. Mind-Body/Stress Management-during massage, in speaking hypnocitally with Moon, I suggested that she allow herself to remember when she didn't have back pain, how she might notice her muscles relaxing during massage, and how she can carry these feelings with her in her day, all while feeling as calm as she does in that moment.     We will continue to support Moon during this admission as is clinically helpful.   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 She has dx of anxiety, asthmas, and chronic low back pain. She is POD# 8 from R pronator tendon lengthening and tonsillectomy for recurrent tonsillitis. At the time of my visit, Moon had c/o of low back pain and was focused on that. Earlier in the day, Xray and clinical sx were concerning for acute chest and she is being transfused.   Although Moon was sleepy, she was responsive and stated that she just wants to \"feel calm\". She described her home life as \"toxic\" and that she herself is \"spoiled and usually gets whatever (she) wants\".  Besides massage, Moon was interested in " acupuncture.     ALLERGIES:  Allergies   Allergen Reactions     Fish-Derived Products Hives     Seafood Hives     Diagnostic X-Ray Materials      PN: sickle cell     Fish Allergy      Gadolinium Other (See Comments)     Tongue swelling       IMMUNIZATIONS:  Immunization History   Administered Date(s) Administered     Influenza Vaccine IM > 6 months Valent IIV4 09/25/2019       CURRENT MEDICATIONS:    Current Facility-Administered Medications:      acetaminophen (TYLENOL) tablet 650 mg, 650 mg, Oral, Q6H, Zaynab Jj MD, 650 mg at 10/10/19 1419     albuterol (PROAIR HFA/PROVENTIL HFA/VENTOLIN HFA) 108 (90 Base) MCG/ACT inhaler 2 puff, 2 puff, Inhalation, Q4H PRN, Tati Pope MD, 4 puff at 10/02/19 1111     aspirin EC tablet 81 mg, 81 mg, Oral, Daily, Tati Pope MD, 81 mg at 10/10/19 0835     [COMPLETED] azithromycin (ZITHROMAX) 500 mg in sodium chloride 0.9 % 250 mL intermittent infusion, 500 mg, Intravenous, Once, 500 mg at 10/07/19 1535 **FOLLOWED BY** azithromycin (ZITHROMAX) 250 mg in sodium chloride 0.9 % 250 mL intermittent infusion, 250 mg, Intravenous, Q24H, Tati Pope MD, 250 mg at 10/10/19 1216     cefTRIAXone (ROCEPHIN) 2 g vial to attach to  ml bag for ADULTS or NS 50 ml bag for PEDS, 2 g, Intravenous, Q24H, Tati Pope MD, 2 g at 10/10/19 1139     celecoxib (celeBREX) capsule 200 mg, 200 mg, Oral, Daily, Tati Pope MD, 200 mg at 10/10/19 1106     deferasirox (JADENU) tablet 900 mg - PATIENT SUPPLIED MEDICATION, 900 mg, Oral, Daily, Tati Pope MD, Stopped at 10/02/19 0633     dextrose 5% and 0.45% NaCl + KCl 20 mEq/L infusion, , Intravenous, Continuous, Belinda Conner MD, Last Rate: 100 mL/hr at 10/10/19 0700     diazepam (VALIUM) tablet 2 mg, 2 mg, Oral, Q6H PRN, Tati Pope MD     EPINEPHrine (ADRENALIN) kit 0.3 mg, 0.3 mg, Intramuscular, Once PRN, Tati Pope MD     erythromycin ethylsuccinate (ERYPED)  suspension 400 mg, 400 mg, Oral, TID AC, Belinda Conner MD, 400 mg at 10/10/19 1140     famotidine (PEPCID) tablet 20 mg, 20 mg, Oral, BID, Tati Pope MD, 20 mg at 10/10/19 0835     fluticasone-vilanterol (BREO ELLIPTA) 100-25 MCG/INH inhaler 1 puff, 1 puff, Inhalation, Daily, Dee Dee Morocho MD, 1 puff at 10/10/19 0800     gabapentin (NEURONTIN) capsule 900 mg, 900 mg, Oral, TID, Tati Pope MD, 900 mg at 10/10/19 1419     heparin 100 UNIT/ML injection 5 mL, 5 mL, Intracatheter, Q28 Days, Tati Pope MD     heparin lock flush 10 UNIT/ML injection 3-6 mL, 3-6 mL, Intracatheter, Q24H, Tati Pope MD     heparin lock flush 10 UNIT/ML injection 3-6 mL, 3-6 mL, Intracatheter, Q1H PRN, Tati Pope MD     HYDROmorphone (DILAUDID) PCA 0.2 mg/mL OPIOID NAIVE CrCl > 50, , Intravenous, Continuous, Tati Pope MD     hydroxyurea (HYDREA/DROXIA) suspension CHEMOTHERAPY 2,000 mg, 2,000 mg, Oral, Daily, Belinda Conner MD, 2,000 mg at 10/10/19 0848     hydrOXYzine (ATARAX) tablet 25 mg, 25 mg, Oral, Q6H PRN, Tati Pope MD, 25 mg at 10/10/19 0109     lidocaine (LMX4) cream, , Topical, Q1H PRN, Tati Pope MD     lidocaine (XYLOCAINE) 2 % solution 5 mL, 5 mL, Mouth/Throat, Q2H PRN, Tati Pope MD, 5 mL at 10/04/19 1746     lidocaine 1 % 0.1-1 mL, 0.1-1 mL, Other, Q1H PRN, Tati Pope MD     naloxone (NARCAN) injection 0.1-0.4 mg, 0.1-0.4 mg, Intravenous, Q2 Min PRN, Tati Pope MD     naloxone (NARCAN) injection 0.1-0.4 mg, 0.1-0.4 mg, Intravenous, Q2 Min PRN, Tati Pope MD     omeprazole (priLOSEC) CR capsule 40 mg, 40 mg, Oral, Daily, Tati Pope MD, 40 mg at 10/10/19 0834     oxyCODONE (ROXICODONE) tablet 10 mg, 10 mg, Oral, Q4H PRN, Tati Pope MD, 10 mg at 10/10/19 1520     oxyCODONE (ROXICODONE) tablet 10 mg, 10 mg, Oral, Q4H, Tati Pope MD, 10 mg at 10/10/19  1419     phenol (CHLORASEPTIC) 1.4 % spray 1 mL, 1 spray, Mouth/Throat, Q1H PRN, Zaynab Jj MD     sennosides (SENOKOT) tablet 8.6 mg, 8.6 mg, Oral, BID, Tati Pope MD, 8.6 mg at 10/10/19 0835     sertraline (ZOLOFT) tablet 50 mg, 50 mg, Oral, Daily, Tati Pope MD, 50 mg at 10/10/19 0835     sodium chloride (PF) 0.9% PF flush 0.2-10 mL, 0.2-10 mL, Intracatheter, q1 min prn, Tati Pope MD, 10 mL at 10/10/19 0554     sodium chloride (PF) 0.9% PF flush 10 mL, 10 mL, Intracatheter, Q28 Days, Tati Pope MD    PAST MEDICAL HISTORY:   Past Medical History:   Diagnosis Date     Anemia      Anxiety      Bleeding disorder (H)      Blood clotting disorder (H)      Cerebral infarction (H)      Depressive disorder      Migraines      Uncomplicated asthma        PAST SURGICAL HISTORY:   Past Surgical History:   Procedure Laterality Date     REPAIR TENDON ELBOW Right 10/2/2019    Procedure: Right Elbow Flexor Lengthening, Flexor Pronator Slide Of Wrist and Finger, Thumb Adductor Lengthening;  Surgeon: Anai Franco MD;  Location: [facility] OR     TONSILLECTOMY Bilateral 10/2/2019    Procedure: Bilateral Tonsillectomy;  Surgeon: Farhana Guy MD;  Location: [facility] OR       FAMILY HISTORY: No family history on file.    SOCIAL HISTORY:   Social History     Tobacco Use     Smoking status: Never Smoker     Smokeless tobacco: Never Used   Substance Use Topics     Alcohol use: Never     Alcohol/week: 0.0 standard drinks     Physical Exam:   Temp:  [98  F (36.7  C)-99.9  F (37.7  C)] 99.1  F (37.3  C)  Heart Rate:  [] 104  Resp:  [16-28] 22  BP: ()/(62-88) 145/82  SpO2:  [95 %-100 %] 97 %  BP (!) 145/82   Pulse 109   Temp 99.1  F (37.3  C) (Oral)   Resp 22   Wt 71.8 kg (158 lb 4.6 oz)   LMP  (LMP Unknown)   SpO2 97%   BMI 26.73 kg/m    Vitals:    10/02/19 0620 10/04/19 1603 10/09/19 1641   Weight: 65.8 kg (145 lb 1 oz) 70.3 kg (154 lb 15.7 oz) 71.8 kg (158 lb 4.6  "oz)        Wt Readings from Last 3 Encounters:   10/09/19 71.8 kg (158 lb 4.6 oz)   09/25/19 65.4 kg (144 lb 2.9 oz)   09/05/19 65.3 kg (144 lb)     Ht Readings from Last 2 Encounters:   09/25/19 1.639 m (5' 4.53\")   09/05/19 1.66 m (5' 5.35\")     Normalized BMI data available only for age 0 to 20 years.  Lying in supine, legs elevated, R arm immobilized in sling wrap  Easy respirations  MSK: muscle tension in subscapular areas L>R   Shoulders tense               Back and meridian massage using her preferred 2% essential oil of lavender               Acupuncture: Yin Ellison      LI-4 L, LR-3-L, SP-6 R  Needle dwell time: 15 min.     Labs and Tests:  Results for orders placed or performed during the hospital encounter of 10/01/19 (from the past 24 hour(s))   CBC with platelets differential   Result Value Ref Range    WBC 18.4 (H) 4.0 - 11.0 10e9/L    RBC Count 2.82 (L) 3.8 - 5.2 10e12/L    Hemoglobin 8.4 (L) 11.7 - 15.7 g/dL    Hematocrit 25.6 (L) 35.0 - 47.0 %    MCV 91 78 - 100 fl    MCH 29.8 26.5 - 33.0 pg    MCHC 32.8 31.5 - 36.5 g/dL    RDW 19.7 (H) 10.0 - 15.0 %    Platelet Count 231 150 - 450 10e9/L    Diff Method Automated Method     % Neutrophils 75.9 %    % Lymphocytes 12.9 %    % Monocytes 6.6 %    % Eosinophils 2.8 %    % Basophils 0.6 %    % Immature Granulocytes 1.2 %    Nucleated RBCs 2 (H) 0 /100    Absolute Neutrophil 14.0 (H) 1.6 - 8.3 10e9/L    Absolute Lymphocytes 2.4 0.8 - 5.3 10e9/L    Absolute Monocytes 1.2 0.0 - 1.3 10e9/L    Absolute Eosinophils 0.5 0.0 - 0.7 10e9/L    Absolute Basophils 0.1 0.0 - 0.2 10e9/L    Abs Immature Granulocytes 0.2 0 - 0.4 10e9/L    Absolute Nucleated RBC 0.3    ABO/Rh type and screen   Result Value Ref Range    Units Ordered 1     ABO O     RH(D) Pos     Antibody Screen Neg     Test Valid Only At          [facility]    Specimen Expires 10/13/2019     Crossmatch Red Blood Cells    Blood component   Result Value Ref Range    " Unit Number [id number]     Blood Component Type Red Blood Cells Leukocyte Reduced     Division Number 00     Status of Unit Released to care unit 10/10/2019 1430     Blood Product Code [id number]     Unit Status ISS    XR Chest Port 1 View    Narrative    Exam: XR CHEST PORT 1 VW  10/10/2019 9:55 AM      History: hx sickle cell with acute chest and prev pulm opacity. Follow  up cxr    Comparison: 10/7/2019    Findings: Left port terminates over the low SVC. Demonstration of  ill-defined and hazy right upper lung opacities. Patchy retrocardiac  attenuation. Elevation of the right hemidiaphragm noted. No pleural  effusion or pneumothorax. Cardiac silhouette is similar in size. Upper  abdomen is stable.      Impression    Impression: Redemonstration of multifocal opacities, most pronounced  in the right upper lung. Differential is unchanged and includes acute  chest syndrome and infection.    JIM DUTTA MD     Thank you for allowing me to participate in the care of your patient. Please do not hesitate to contact me with any questions or concerns.      Time Spent on this Encounter   I, Oksana Kiran, spent a total of 35 minutes bedside and on the inpatient unit today, excluding acupuncture,  managing the care of Jennifer Cervantes.  Over 50% of my time on the unit was spent counseling the patient-family and /or coordinating care. Spoke with PT and medical teams. See note for details.    Oksana Kiran MD, CM  Medical Director Pediatric Integrative Health and Wellbeing, [facility]    CC  Patient Care Team

## 2019-10-10 NOTE — PLAN OF CARE
Sleeping at the start of the shift.  Still having urgency when needing to void.  Slightly lethargic acting but answering questions appropriately, not dizzy when getting up.  Rating pain at a 10 tonight.  Atarax given and patient slightly sleepy afterwards and after eating a bowl of oatmeal and a carton of pudding.  Ate 2 more carons of pudding during the night with medications.  Sleeping well between cares.  Appeared to rest more comfortable tonight than last night, less moaning, but patient continue to rate pain at 10. Complaining of less all over pain and more chest, throat and back pain tonight. Lungs clear. Continued on oxygen for comfort only.  Cooperative with cares.  Up to the commode x 2 with 2 small stools.  Good urine output.

## 2019-10-10 NOTE — PLAN OF CARE
AVSS. Pt lethargic and drowsy throughout afternoon, but alert. LS clear on RA, diminished in the bases. C/o full body pain all day; scheduled pain meds continue. Oxy now scheduled q4h and PRN. Good PO intake. Good UOP; no stool. Casted arm shows good CMS with no numbness/tingling and good cap refill. No foot swelling noted. Port infusing Maintenance fluids and dilaudid with no issues. PRBCs ordered and started. No family at bedside. Pt updated on POC for afternoon. Hourly rounding completed.Will continue to monitor and reassess.

## 2019-10-10 NOTE — PLAN OF CARE
"PT Unit 5: Patient seen by PT for progression of safe mobility toward independence. Patient continues to require close SBA with ambulation today though patient claims she is \"calm\" - requires verbal cueing to avoid hitting cast on wall and obstacles. She states her pain continues to be global and intense, not able to ambulate before PT arrival. Encouraged patient to complete walking program of 2 laps tonight after resting some. Ordered new arm sling for patient as it has been misplaced.    Pavithra Pagan, PT, DPT  "

## 2019-10-11 ENCOUNTER — APPOINTMENT (OUTPATIENT)
Dept: PHYSICAL THERAPY | Facility: CLINIC | Age: 19
DRG: 500 | End: 2019-10-11
Attending: PEDIATRICS
Payer: COMMERCIAL

## 2019-10-11 LAB
BASOPHILS # BLD AUTO: 0.1 10E9/L (ref 0–0.2)
BASOPHILS NFR BLD AUTO: 0.6 %
DIFFERENTIAL METHOD BLD: ABNORMAL
EOSINOPHIL # BLD AUTO: 0.5 10E9/L (ref 0–0.7)
EOSINOPHIL NFR BLD AUTO: 2.6 %
ERYTHROCYTE [DISTWIDTH] IN BLOOD BY AUTOMATED COUNT: 20.5 % (ref 10–15)
HCT VFR BLD AUTO: 28.3 % (ref 35–47)
HGB BLD-MCNC: 9.3 G/DL (ref 11.7–15.7)
IMM GRANULOCYTES # BLD: 0.2 10E9/L (ref 0–0.4)
IMM GRANULOCYTES NFR BLD: 1 %
LYMPHOCYTES # BLD AUTO: 2.3 10E9/L (ref 0.8–5.3)
LYMPHOCYTES NFR BLD AUTO: 11.4 %
MCH RBC QN AUTO: 29.2 PG (ref 26.5–33)
MCHC RBC AUTO-ENTMCNC: 32.9 G/DL (ref 31.5–36.5)
MCV RBC AUTO: 89 FL (ref 78–100)
MONOCYTES # BLD AUTO: 0.9 10E9/L (ref 0–1.3)
MONOCYTES NFR BLD AUTO: 4.5 %
NEUTROPHILS # BLD AUTO: 15.8 10E9/L (ref 1.6–8.3)
NEUTROPHILS NFR BLD AUTO: 79.9 %
NRBC # BLD AUTO: 0.3 10*3/UL
NRBC BLD AUTO-RTO: 2 /100
PLATELET # BLD AUTO: 236 10E9/L (ref 150–450)
RBC # BLD AUTO: 3.18 10E12/L (ref 3.8–5.2)
WBC # BLD AUTO: 19.8 10E9/L (ref 4–11)

## 2019-10-11 PROCEDURE — 99233 SBSQ HOSP IP/OBS HIGH 50: CPT | Performed by: NURSE PRACTITIONER

## 2019-10-11 PROCEDURE — 12000014 ZZH R&B PEDS UMMC

## 2019-10-11 PROCEDURE — 85025 COMPLETE CBC W/AUTO DIFF WBC: CPT | Performed by: STUDENT IN AN ORGANIZED HEALTH CARE EDUCATION/TRAINING PROGRAM

## 2019-10-11 PROCEDURE — 25800030 ZZH RX IP 258 OP 636: Performed by: STUDENT IN AN ORGANIZED HEALTH CARE EDUCATION/TRAINING PROGRAM

## 2019-10-11 PROCEDURE — 25000132 ZZH RX MED GY IP 250 OP 250 PS 637: Performed by: PEDIATRICS

## 2019-10-11 PROCEDURE — 25000132 ZZH RX MED GY IP 250 OP 250 PS 637: Performed by: STUDENT IN AN ORGANIZED HEALTH CARE EDUCATION/TRAINING PROGRAM

## 2019-10-11 PROCEDURE — 36592 COLLECT BLOOD FROM PICC: CPT | Performed by: STUDENT IN AN ORGANIZED HEALTH CARE EDUCATION/TRAINING PROGRAM

## 2019-10-11 PROCEDURE — 94640 AIRWAY INHALATION TREATMENT: CPT

## 2019-10-11 PROCEDURE — 25800030 ZZH RX IP 258 OP 636: Performed by: PEDIATRICS

## 2019-10-11 PROCEDURE — 40000275 ZZH STATISTIC RCP TIME EA 10 MIN

## 2019-10-11 PROCEDURE — 97140 MANUAL THERAPY 1/> REGIONS: CPT | Mod: GP

## 2019-10-11 PROCEDURE — 25000128 H RX IP 250 OP 636: Performed by: STUDENT IN AN ORGANIZED HEALTH CARE EDUCATION/TRAINING PROGRAM

## 2019-10-11 RX ORDER — OXYCODONE HYDROCHLORIDE 5 MG/1
10 TABLET ORAL EVERY 6 HOURS
Status: DISCONTINUED | OUTPATIENT
Start: 2019-10-11 | End: 2019-10-12

## 2019-10-11 RX ORDER — OXYCODONE HYDROCHLORIDE 5 MG/1
10 TABLET ORAL EVERY 4 HOURS PRN
Status: DISCONTINUED | OUTPATIENT
Start: 2019-10-11 | End: 2019-10-12

## 2019-10-11 RX ORDER — CEPHALEXIN 250 MG/5ML
1000 POWDER, FOR SUSPENSION ORAL 3 TIMES DAILY
Status: DISCONTINUED | OUTPATIENT
Start: 2019-10-11 | End: 2019-10-11

## 2019-10-11 RX ORDER — CEFDINIR 125 MG/5ML
300 POWDER, FOR SUSPENSION ORAL 2 TIMES DAILY
Status: DISCONTINUED | OUTPATIENT
Start: 2019-10-11 | End: 2019-10-13 | Stop reason: HOSPADM

## 2019-10-11 RX ORDER — CELECOXIB 100 MG/1
100 CAPSULE ORAL DAILY
Status: DISCONTINUED | OUTPATIENT
Start: 2019-10-12 | End: 2019-10-13 | Stop reason: HOSPADM

## 2019-10-11 RX ADMIN — OXYCODONE HYDROCHLORIDE 10 MG: 5 TABLET ORAL at 03:17

## 2019-10-11 RX ADMIN — SENNOSIDES 8.6 MG: 8.6 TABLET, FILM COATED ORAL at 08:11

## 2019-10-11 RX ADMIN — CELECOXIB 200 MG: 200 CAPSULE ORAL at 08:12

## 2019-10-11 RX ADMIN — OXYCODONE HYDROCHLORIDE 10 MG: 5 TABLET ORAL at 13:10

## 2019-10-11 RX ADMIN — FAMOTIDINE 20 MG: 20 TABLET ORAL at 21:11

## 2019-10-11 RX ADMIN — HYDROXYZINE HYDROCHLORIDE 25 MG: 25 TABLET, FILM COATED ORAL at 21:28

## 2019-10-11 RX ADMIN — ACETAMINOPHEN 650 MG: 325 TABLET, FILM COATED ORAL at 09:49

## 2019-10-11 RX ADMIN — ERYTHROMYCIN ETHYLSUCCINATE 400 MG: 400 SUSPENSION ORAL at 17:42

## 2019-10-11 RX ADMIN — CEFDINIR 300 MG: 125 POWDER, FOR SUSPENSION ORAL at 14:27

## 2019-10-11 RX ADMIN — FLUTICASONE FUROATE AND VILANTEROL TRIFENATATE 1 PUFF: 100; 25 POWDER RESPIRATORY (INHALATION) at 09:05

## 2019-10-11 RX ADMIN — AZITHROMYCIN MONOHYDRATE 250 MG: 500 INJECTION, POWDER, LYOPHILIZED, FOR SOLUTION INTRAVENOUS at 12:10

## 2019-10-11 RX ADMIN — CEFDINIR 300 MG: 125 POWDER, FOR SUSPENSION ORAL at 21:18

## 2019-10-11 RX ADMIN — POTASSIUM CHLORIDE, DEXTROSE MONOHYDRATE AND SODIUM CHLORIDE: 150; 5; 450 INJECTION, SOLUTION INTRAVENOUS at 06:33

## 2019-10-11 RX ADMIN — OMEPRAZOLE 40 MG: 20 CAPSULE, DELAYED RELEASE ORAL at 08:11

## 2019-10-11 RX ADMIN — ERYTHROMYCIN ETHYLSUCCINATE 400 MG: 400 SUSPENSION ORAL at 08:11

## 2019-10-11 RX ADMIN — GABAPENTIN 900 MG: 300 CAPSULE ORAL at 14:48

## 2019-10-11 RX ADMIN — Medication 2000 MG: at 09:39

## 2019-10-11 RX ADMIN — DEXTRAN 70, AND HYPROMELLOSE 2910 2 DROP: 1; 3 SOLUTION/ DROPS OPHTHALMIC at 21:17

## 2019-10-11 RX ADMIN — OXYCODONE HYDROCHLORIDE 10 MG: 5 TABLET ORAL at 06:07

## 2019-10-11 RX ADMIN — OXYCODONE HYDROCHLORIDE 10 MG: 5 TABLET ORAL at 22:34

## 2019-10-11 RX ADMIN — DEXTRAN 70, AND HYPROMELLOSE 2910 2 DROP: 1; 3 SOLUTION/ DROPS OPHTHALMIC at 08:12

## 2019-10-11 RX ADMIN — SERTRALINE HYDROCHLORIDE 50 MG: 50 TABLET ORAL at 08:12

## 2019-10-11 RX ADMIN — ACETAMINOPHEN 650 MG: 325 TABLET, FILM COATED ORAL at 14:48

## 2019-10-11 RX ADMIN — ERYTHROMYCIN ETHYLSUCCINATE 400 MG: 400 SUSPENSION ORAL at 12:09

## 2019-10-11 RX ADMIN — ACETAMINOPHEN 650 MG: 325 TABLET, FILM COATED ORAL at 21:11

## 2019-10-11 RX ADMIN — OXYCODONE HYDROCHLORIDE 10 MG: 5 TABLET ORAL at 09:50

## 2019-10-11 RX ADMIN — OXYCODONE HYDROCHLORIDE 10 MG: 5 TABLET ORAL at 16:27

## 2019-10-11 RX ADMIN — SENNOSIDES 8.6 MG: 8.6 TABLET, FILM COATED ORAL at 21:11

## 2019-10-11 RX ADMIN — DEXTRAN 70, AND HYPROMELLOSE 2910 2 DROP: 1; 3 SOLUTION/ DROPS OPHTHALMIC at 14:48

## 2019-10-11 RX ADMIN — GABAPENTIN 900 MG: 300 CAPSULE ORAL at 21:11

## 2019-10-11 RX ADMIN — ASPIRIN 81 MG: 81 TABLET ORAL at 08:11

## 2019-10-11 RX ADMIN — ACETAMINOPHEN 650 MG: 325 TABLET, FILM COATED ORAL at 03:17

## 2019-10-11 RX ADMIN — FAMOTIDINE 20 MG: 20 TABLET ORAL at 08:12

## 2019-10-11 RX ADMIN — OXYCODONE HYDROCHLORIDE 10 MG: 5 TABLET ORAL at 19:41

## 2019-10-11 RX ADMIN — GABAPENTIN 900 MG: 300 CAPSULE ORAL at 09:49

## 2019-10-11 NOTE — PLAN OF CARE
[id number]: Afebrile. VSS. Patient requested to use oxymask at 2L overnight. Continues to c/o full body pain overnight; scheduled pain meds continue. Patient was encouraged by this RN to use PCA pump. C/o of eye dryness; drops ordered. Patient continues to be drowsy overnight; alert and responds to commands appropriately. Lungs clear; diminished in bases. Good PO intake. No stool. ENG. Right casted arm has good CMS. Port continues to infuse w/o issues. No family at bedside. Hourly rounding completed. Will continue to monitor and update MD with any changes.

## 2019-10-11 NOTE — PLAN OF CARE
Pt's vitals remained stable throughout the shift. Pt tolerating soft mechanical PO intake. Pt rating pain 10/10, scheduled oxy given, heat pad applied as it was too early to give prn dose. Pt has good UOP and had a BM this afternoon. No reports of nausea. Will continue to monitor and notify MD with any changes.

## 2019-10-11 NOTE — PROGRESS NOTES
Afebrile vital signs stable.  Patient had good PO intake.  No nausea or emesis noted.  Discontinue dilaudid PCA.  Patient is on scheduled pain medications and patient received Oxycodone PRN X 1 with good relief.  Patient was napping comfortably.  Hourly rounding completed.  Continue to monitor and update MD with any changes.

## 2019-10-11 NOTE — PLAN OF CARE
PT Unit 5: Jennifer was seen by PT with session focused on improvement of pain. Provided soft tissue mobilization massage to upper and lower back. Pt reported improved pain and relaxation following session. Encouraged pt to ambulate in the halls this PM with staff. Pt agreeable to plan. Provided with warm blankets at end of session. Plan for PT to continue to follow daily to progress as tolerated.    Vida Monreal, PT, [phone]

## 2019-10-11 NOTE — PROGRESS NOTES
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 Pain is currently localized to left chest and back, and these are now more bothersome than her operative arm and her throat.     Despite high pain ratings on NRS, patient has been reported as calm and cooperative when awake which is different than her reported typical demeanor with poor pain control (self-described as angry and agitated). Ready to transition to oral analgesic regimen today     Diagnosis  Types and sources of pain:  - Nociceptive: acute post operative pain secondary to above procedures, left chest d/t VOC; improving  - Neuropathic: nerve involvement in surgery, potential exists - \"burning\" pain in bilateral arms; improving  - Psycho-social-spiritual-emotional: fear of stigma and worry around being identified as \"drug-seeking,\" anxiety around transitions; this appears to be negatively impacting pain;   - Chronic: history of multiple sickle cell crises per year; chronic back pain, " "improving after breast reduction late spring/early summer - worried about increased back pain over the past few days  Advance care planning:   - Not appropriate to address at this visit. Assessments will be ongoing.  Discharge planning: likely over the weekend    Recommendations    POSTOPERATIVE PAIN MANAGEMENT AND RECOMMENDATIONS:   Simple Analgesia:  - Acetaminophen 15 mg/kg po Q6h scheduled. Change to PRN on discharge  - Change celecoxib to 100 mg po BID (home dose)     Opioid Analgesia:(transition off PCA - she will essentially be able to receive oxycodone ~Q3h which is a slight decrease as compared with her 24 hour PCA use)  - To transition off PCA:  - change oxycodone to 10 mg po Q6h scheduled + Q4h PRN for moderate to severe breakthrough pain (doses must be given 2 hours apart from one another)  - give PRN dose 2-3 hours after most recent scheduled dose (as long as she is alert, oriented) and stop PCA  - Ultimately, would plan for discharge with oxycodone 10 mg po Q3-4h PRN and instructions to reduce by one dose per day (ex: 6, 5, 4, 3, 2, 1, off) and quantity sufficient for this taper    ADJUVANT ANALGESIA  - Gabapentin 900 mg TID (temporary increase due to neuropathic pain). Once off opioids, recommend tapering back to prior dose as follows:  - 600 mg po in the morning, 900 mg po in the afternoon and evening x2 days, then  - 600 mg po in the morning and afternoon, 900 mg po in the evening x2 days, then  - 600 mg po TID  - Diazepam 2 mg po Q6h PRN muscle spasms - discontinue tomorrow if patient is not using this  - hydroxyzine 25 mg Ipo Q6h PRN pain as an opioid-sparing adjuvant - discontinue tomorrow if patient is not using this  - lidocaine swish/gargle per ENT vs chloraseptic spray   - declines lidocaine patches     ADVERSE REACTION MANAGEMENT  Constipation: PLEASE SCHEDULE as soon as able to take oral medications:  \"Mush\": Start polyethylene glycol 3350, 17g daily  \"Push\": Start senna, 5 mls or 1 tab " "HS  \"Uncorking\": Consider dulcolax suppository or Fleet enema if no stool in 24 hours.  Pruritus: If opioid-induced pruritus, would start naloxone continuous infusion at 0.5 mcg/kg/hr.  Can titrate upwards (usually done in 0.5 mcg/kg/hr increments) till a maximum dose of 2 mcg/kg/hr is reached.  If pruritus is still a distressing symptom at maximum doses of naloxone, contact the PACCT provider on call for assistance with an opioid rotation.  Note that opioid-induced pruritus is NOT a histamine-mediated reaction, therefore antihistamines (such as diphenhydramine/Benadryl ) are generally ineffective in resolving this symptom.  Nausea/Vomiting: recommend PRN ondansetron as first line treatment     Non-medication strategies:   - Child Life Specialist, mental health provider, and/or caregiver support, when appropriate and available   - provided patient with notepad and marker as a communication aid   - encourage cold soft foods for anesthetic benefit (ex: popsicles, sherbet, etc)   - Allow choices where permitted, positioning, rapport builiding   - Cognitive: auditory stimuli (e.g. tablets), control, controlled breathing, distraction, imagery, hypnosis (by trained provider only), modeling, relaxation, prepare for coping techniques and/or teaching procedures, relaxation   - Biophysical: environmental modification, holding, touching, massage, heat or cold application   - Distraction: music, TV, video games, guided imagery, deep breathing, conversation  Other considerations: Older patients may or may not want caregiver presence. Establish rapport to increase cooperation. Allow to watch procedure, if requested    The above recommendations are based on the WHO Guidelines for the Pharmacological Treatment of Persisting Pain in Children with Medical Illnesses: (1) using a two-step strategy, (2) dosing at regular intervals, (3) using the appropriate route of administration, and (4) adapting treatment to the individual child " (available at: http://apps.who.int/iris/bitstream/86756/[email or web address]_Guidelines.pdf).    Thank you for the opportunity to participate in the care of Jennifer and her family.  Please contact the Pain and Advanced/Complex Care Team (PACCT) with any emergent needs via text page to the PACCT general pager ([phone], answered 8-4:30 Monday to Friday).  After 4:30 pm and on weekends/holidays, please refer to the [facility] or [facility] on-call schedule.    The above assessment and recommendations were discussed with Jennifer's care team.  All parties involved agree with the above recommendations.  A total of 60 minutes were spent face-to-face with and in the coordination of care of Jennifer Cervantes.  Greater than 50% of my time on the unit was spent counseling the patient and/or coordinating care.    Echo Iniguez NP, APRN CNP   Pain and Advanced/Complex Care Team (PACCT)  [facility]  PACCT Pager: [phone]    SUBJECTIVE: Interim History  Drowsy overnight, reporting whole body discomfort. Working towards home-friendly regimen. Jennifer is frustrated with pain and fearful of possibility of discharge tomorrow but willing to try current plan. Requesting integrative therapies (massage or acupuncture) today for help with back pain and anxiety.    OBJECTIVE: Last 24 hours (from 08:00 yesterday morning to 08:00 this morning)  VITALS: Reviewed; all vital signs were within normal limits for age.  INS/OUTS:   Taking PO? yes  Bowel movements? yes (Last documented bowel movement: 10/11, soft)  PAIN (0-10 Numeric Pain Rating Scale, with 10 being the worst pain imaginable): 7-10/10 (left chest, back, right arm, throat)    Current Medications  I have reviewed this Jennifer's medication profile and medications during this hospitalization.    Current Facility-Administered Medications   Medication     acetaminophen (TYLENOL) tablet 650 mg     albuterol (PROAIR HFA/PROVENTIL HFA/VENTOLIN HFA)  108 (90 Base) MCG/ACT inhaler 2 puff     aspirin EC tablet 81 mg     azithromycin (ZITHROMAX) 250 mg in sodium chloride 0.9 % 250 mL intermittent infusion     cefTRIAXone (ROCEPHIN) 2 g vial to attach to  ml bag for ADULTS or NS 50 ml bag for PEDS     celecoxib (celeBREX) capsule 200 mg     deferasirox (JADENU) tablet 900 mg - PATIENT SUPPLIED MEDICATION     dextrose 5% and 0.45% NaCl + KCl 20 mEq/L infusion     diazepam (VALIUM) tablet 2 mg     EPINEPHrine (ADRENALIN) kit 0.3 mg     erythromycin ethylsuccinate (ERYPED) suspension 400 mg     famotidine (PEPCID) tablet 20 mg     fluticasone-vilanterol (BREO ELLIPTA) 100-25 MCG/INH inhaler 1 puff     gabapentin (NEURONTIN) capsule 900 mg     heparin 100 UNIT/ML injection 5 mL     heparin lock flush 10 UNIT/ML injection 3-6 mL     heparin lock flush 10 UNIT/ML injection 3-6 mL     HYDROmorphone (DILAUDID) PCA 0.2 mg/mL OPIOID NAIVE CrCl > 50     hydroxyurea (HYDREA/DROXIA) suspension CHEMOTHERAPY 2,000 mg     hydrOXYzine (ATARAX) tablet 25 mg     hypromellose-dextran (ARTIFICAL TEARS) 0.1-0.3 % ophthalmic solution 2 drop     lidocaine (LMX4) cream     lidocaine (XYLOCAINE) 2 % solution 5 mL     lidocaine 1 % 0.1-1 mL     naloxone (NARCAN) injection 0.1-0.4 mg     naloxone (NARCAN) injection 0.1-0.4 mg     omeprazole (priLOSEC) CR capsule 40 mg     oxyCODONE (ROXICODONE) tablet 10 mg     oxyCODONE (ROXICODONE) tablet 10 mg     phenol (CHLORASEPTIC) 1.4 % spray 1 mL     sennosides (SENOKOT) tablet 8.6 mg     sertraline (ZOLOFT) tablet 50 mg     sodium chloride (PF) 0.9% PF flush 0.2-10 mL     sodium chloride (PF) 0.9% PF flush 10 mL     Medications related to this visit are as follows (with PRN use indicated from 08:00 yesterday morning to 08:00 this morning):   Medications related to Pain Management (From now, onward)    Start     Dose/Rate Route Frequency Ordered Stop    10/10/19 1045  oxyCODONE (ROXICODONE) tablet 10 mg      10 mg Oral EVERY 4 HOURS 10/10/19  1036      10/10/19 1045  HYDROmorphone (DILAUDID) PCA 0.2 mg/mL OPIOID NAIVE CrCl > 50       Intravenous CONTINUOUS 10/10/19 1036      10/10/19 0945  celecoxib (celeBREX) capsule 200 mg      200 mg Oral DAILY 10/10/19 0939      10/10/19 0941  oxyCODONE (ROXICODONE) tablet 10 mg      10 mg Oral EVERY 4 HOURS PRN 10/10/19 0941      10/09/19 1400  gabapentin (NEURONTIN) capsule 900 mg      900 mg Oral 3 TIMES DAILY 10/09/19 1126      10/07/19 0959  diazepam (VALIUM) tablet 2 mg      2 mg Oral EVERY 6 HOURS PRN 10/07/19 0959      10/06/19 2000  sennosides (SENOKOT) tablet 8.6 mg      8.6 mg Oral 2 TIMES DAILY 10/06/19 0945      10/06/19 0716  hydrOXYzine (ATARAX) tablet 25 mg      25 mg Oral EVERY 6 HOURS PRN 10/06/19 0716      10/05/19 2100  acetaminophen (TYLENOL) tablet 650 mg      650 mg Oral EVERY 6 HOURS 10/05/19 1424      10/02/19 0700  aspirin EC tablet 81 mg      81 mg Oral DAILY 10/01/19 1605      10/01/19 1456  lidocaine 1 % 0.1-1 mL      0.1-1 mL Other EVERY 1 HOUR PRN 10/01/19 1501      10/01/19 1456  lidocaine (LMX4) cream       Topical EVERY 1 HOUR PRN 10/01/19 1501          Hydromorphone PCA:   - hydromorphone continuous infusion + PCA:  PCA dose: 0.3 mg  Lockout interval: 15 minutes  Continuous rate: 0 mg  One hour dose limit: 0.9 mg (i.e. 3 boluses allowed in 1 hour)  24-Hour PCA Use (last 24 hours; ending times noted in parentheses) & History:     Delivered Denied Amount (mg)   Day (15:35) 12 11 3.6   Evening (19:33) 3 0 0.76   Evening (22:59) 4 0 1.2   Overnight (ND) ND ND    24h Total 19 11 5.56   History (in mg, ending am of date below):  10-Oct 7.2   9-Oct 7   8-Oct 7   7-Oct 5   6-Oct 7.8   5-Oct 5.03*   4-Oct 12.34   3-Oct 8.23   *po oxycodone in place of basal rate  Physical Examination  Awake, alert. Tearful, upset over pain and plans for discharge.  Normocephalic, nares without discharge. Opens eyes spontaneously. Pupils 3 mm, reactive  Mild tachypnea at rest. LCTAB  Abdomen soft, round,  nontender  Right arm in cast. DUARTE. Trace bilateral LE edema, nonpitting    Laboratory/Imaging/Pathology  CHEMISTRY  AST   Date Value Ref Range Status   10/01/2019 47 (H) 0 - 45 U/L Final     ALT   Date Value Ref Range Status   10/01/2019 47 0 - 50 U/L Final     Creatinine   Date Value Ref Range Status   10/09/2019 0.54 0.52 - 1.04 mg/dL Final       Estimated Creatinine Clearance: 163.2 mL/min (based on SCr of 0.54 mg/dL).    HEMATOLOGY  Platelet Count   Date Value Ref Range Status   10/11/2019 236 150 - 450 10e9/L Final     WBC   Date Value Ref Range Status   10/11/2019 19.8 (H) 4.0 - 11.0 10e9/L Final     Hemoglobin   Date Value Ref Range Status   10/11/2019 9.3 (L) 11.7 - 15.7 g/dL Final     All other laboratory values, medical imaging and pathology reports acquired/resulted in the last 24 hours were reviewed.  Refer to the EMR for any details not referenced above.

## 2019-10-11 NOTE — PROGRESS NOTES
[facility], [place]    Progress Note - Peds Heme/Onc Service        Date of Admission:  10/1/2019    Assessment & Plan   Jennifer Cervantes is a 19 year old female with HbSS disease on chronic transfusion program for secondary stroke prevention and complex PMH who is [facility] who was admitted 10/1 in preparation for for planned surgical procedures (10/2) with ENT -- Dr. Guy (tonsillectomy) and Ortho -- Dr. Franco (right elbow flexor pronator plasty, right elbow brachialis lengthening, right elbow biceps tenotomy, right thumb thenar release). Now POD#9 with chest pain and infiltrates concerning for acute chest syndrome. Transfused yesterday with improvement in hemoglobin and CXR with improving infiltrates. Converting medications to PO today with hopes of discharge tomorrow. Also still working on pain control and hydration.     Changes Today:  - discontinue PCA  - complete azithromycin, convert ceftriaxone to cefdinir  - oxycodone to q6h scheduled with q4h prn  - decrease celebrex to home dose of 100 mg BID  - discontinue IVF     Hematology  History of left MCA stroke  Hypercoagulability  History of TIA  - Continue home ASA 81 mg qDay     HbSS  - Continue home deferasirox 900 mg daily (if family brings from home)  - Continue home erythromycin 400 mg TID  - Continue home hydroxyurea 2000 mg daily  - AM CBC with retic     Neuro  Chronic pain  Acute post-operative pain  - PACCT team consulted, appreciate recs  - PT consulted for early ambulation  - Oxycodone 10mg PO Q6H with q4h prn (must be given 2 hours apart)  - Scheduled tylenol q6h PO  - Celebrex 100 mg BID  - Diazepam oral 2 mg q6h PRN for muscle spasms  - Continue gabapentin 900mg TID  - Continue Zoloft 50mg daily    ENT  S/p bilateral tonsillectomy  - 10 mg decadron IV x3 doses, now complete. s/p 5 days of prednisolone taper.  - 2% viscous lidocaine rinses q2h prn  - Soft diet x 2  weeks  - ENT must be paged immediately for any spitting up or coughing of blood     Resp  Asthma  - Continue home albuterol 2 puffs q4h prn   - On symbicort BID at home. Started on Breo Elipta 1 puff daily.  - PT consult placed and encouraged incentive spirometry  - O2 sats >94% on RA. O2 mask PRN for desaturations.    Acute chest syndrome vs. Infection  - Ceftriaxone 2 g q24h --> cefdinir 300 mg BID  - Azithromycin 500 mg x 1 followed by 250 mg q24h for total of 5 doses-- will ent today  - O2 for comfort     FEN/GI  - discontinue IVF  - Soft diet x 2 weeks  - Famotidine 20mg BID  - Miralax 17g daily  - Senokot 8.6mg daily     Access: Port  Disposition: Pending post-operative recovery    Disposition Plan   Expected discharge: tomorrow, recommended to prior living arrangement once adequate pain management/ tolerating PO medications.    The patient's care was discussed with Dr. Arely Pope MD  Pediatric PGY3    Physician Attestation   I, Arely Mcgill MD, MD, saw this patient with the resident and agree with the resident/fellow's findings and plan of care as documented in the note.      I personally reviewed vital signs, medications, labs and imaging.    Arely Mcgill MD, MD  Date of Service (when I saw the patient): 10/11/19    Interval History   In pain this morning but noted to be very sedated yesterday. Open to turning off PCA. Breathing improved but continued rib and back pain. Tolerating PO. Stooling.     Data reviewed today: I reviewed all medications, new labs and imaging results over the last 24 hours. I personally reviewed no images or EKG's today.    Physical Exam   Vital Signs: Temp: 99.3  F (37.4  C) Temp src: Axillary BP: 122/77 Pulse: 97 Heart Rate: 103 Resp: 22 SpO2: 98 % O2 Device: Oxymask Oxygen Delivery: 2 LPM  Weight: 158 lbs 1.12 oz     Constitutional: Appears sleepy (eyes closed), however is sitting upright in bed and answering questions appropriately,  cooperative, no apparent distress.   Eyes: EOMs grossly intact, sclera clear, conjunctiva normal  ENT: Normocephalic, without obvious abnormality, atraumatic, oral pharynx, granulation tissue, moist mucous membranes. No active bleeding.   Lymphatic: No significant cervical or supraclavicular lymphadenopathy.  Respiratory: Mild nasal flaring resolved, no increased WOB, normal RR, fair aeration, clear to auscultation bilaterally, no crackles or wheezing. Complains of pain at lower left lateral chest  Cardiovascular: Regular rate and rhythm, normal S1 and S2, left radial pulse normal, no significant edema  GI: Low to normal active bowel sounds, mild distended and TTP, mild hepatosplenomegaly  Extremities: Right arm immobilized in slight flexion  Neurologic: Awake, alert, oriented, cranial nerves grossly intact, neurovascularly intact    Data   Recent Labs   Lab 10/11/19  0519 10/10/19  0559 10/09/19  0630  10/07/19  1051   WBC 19.8* 18.4* 16.9*   < > 19.2*   HGB 9.3* 8.4* 9.1*   < > 9.4*   MCV 89 91 91   < > 91    231 225   < > 216   NA  --   --  141  --  143   POTASSIUM  --   --  4.3  --  3.2*   CHLORIDE  --   --  104  --  110*   CO2  --   --  31  --  27   BUN  --   --  5*  --  6*   CR  --   --  0.54  --  0.45*   ANIONGAP  --   --  6  --  6   MICAH  --   --  8.0*  --  7.6*   GLC  --   --  105*  --  93    < > = values in this interval not displayed.     Medications     dextrose 5% and 0.45% NaCl + KCl 20 mEq/L 100 mL/hr at 10/11/19 0633       acetaminophen  650 mg Oral Q6H     aspirin  81 mg Oral Daily     azithromycin  250 mg Intravenous Q24H     cefdinir  300 mg Oral BID     [START ON 10/12/2019] celecoxib  100 mg Oral Daily     deferasirox  900 mg Oral Daily     erythromycin  400 mg Oral TID AC     famotidine  20 mg Oral BID     fluticasone-vilanterol  1 puff Inhalation Daily     gabapentin  900 mg Oral TID     heparin  5 mL Intracatheter Q28 Days     heparin lock flush  3-6 mL Intracatheter Q24H      hydroxyurea  2,000 mg Oral Daily     hypromellose-dextran  2 drop Left Eye TID     omeprazole  40 mg Oral Daily     oxyCODONE  10 mg Oral Q6H     sennosides  8.6 mg Oral BID     sertraline  50 mg Oral Daily     sodium chloride (PF)  10 mL Intracatheter Q28 Days

## 2019-10-12 ENCOUNTER — APPOINTMENT (OUTPATIENT)
Dept: MRI IMAGING | Facility: CLINIC | Age: 19
DRG: 500 | End: 2019-10-12
Attending: PEDIATRICS
Payer: COMMERCIAL

## 2019-10-12 ENCOUNTER — APPOINTMENT (OUTPATIENT)
Dept: PHYSICAL THERAPY | Facility: CLINIC | Age: 19
DRG: 500 | End: 2019-10-12
Attending: PEDIATRICS
Payer: COMMERCIAL

## 2019-10-12 PROCEDURE — 40000275 ZZH STATISTIC RCP TIME EA 10 MIN

## 2019-10-12 PROCEDURE — 25000132 ZZH RX MED GY IP 250 OP 250 PS 637: Performed by: STUDENT IN AN ORGANIZED HEALTH CARE EDUCATION/TRAINING PROGRAM

## 2019-10-12 PROCEDURE — 25000132 ZZH RX MED GY IP 250 OP 250 PS 637

## 2019-10-12 PROCEDURE — 94640 AIRWAY INHALATION TREATMENT: CPT

## 2019-10-12 PROCEDURE — 70551 MRI BRAIN STEM W/O DYE: CPT

## 2019-10-12 PROCEDURE — 25000128 H RX IP 250 OP 636: Performed by: STUDENT IN AN ORGANIZED HEALTH CARE EDUCATION/TRAINING PROGRAM

## 2019-10-12 PROCEDURE — 97530 THERAPEUTIC ACTIVITIES: CPT | Mod: GP

## 2019-10-12 PROCEDURE — 25000132 ZZH RX MED GY IP 250 OP 250 PS 637: Performed by: PEDIATRICS

## 2019-10-12 PROCEDURE — 12000014 ZZH R&B PEDS UMMC

## 2019-10-12 RX ORDER — OXYCODONE HCL 20 MG/1
TABLET, FILM COATED, EXTENDED RELEASE ORAL
Qty: 9 TABLET | Refills: 0 | Status: SHIPPED | OUTPATIENT
Start: 2019-10-12 | End: 2019-10-12

## 2019-10-12 RX ORDER — OXYCODONE HCL 20 MG/1
20 TABLET, FILM COATED, EXTENDED RELEASE ORAL EVERY 12 HOURS
Status: DISCONTINUED | OUTPATIENT
Start: 2019-10-12 | End: 2019-10-12

## 2019-10-12 RX ORDER — OXYCODONE HYDROCHLORIDE 5 MG/1
10 TABLET ORAL EVERY 6 HOURS
Status: DISCONTINUED | OUTPATIENT
Start: 2019-10-12 | End: 2019-10-13 | Stop reason: HOSPADM

## 2019-10-12 RX ORDER — HYDROXYZINE HYDROCHLORIDE 25 MG/1
25 TABLET, FILM COATED ORAL EVERY 6 HOURS PRN
Qty: 30 TABLET | Refills: 0 | Status: SHIPPED | OUTPATIENT
Start: 2019-10-12 | End: 2020-03-13

## 2019-10-12 RX ORDER — OXYCODONE HYDROCHLORIDE 5 MG/1
10 TABLET ORAL EVERY 6 HOURS PRN
Status: DISCONTINUED | OUTPATIENT
Start: 2019-10-12 | End: 2019-10-12

## 2019-10-12 RX ORDER — OXYCODONE HYDROCHLORIDE 10 MG/1
TABLET ORAL
Qty: 25 TABLET | Refills: 0 | Status: SHIPPED | OUTPATIENT
Start: 2019-10-12 | End: 2020-03-13

## 2019-10-12 RX ORDER — OXYCODONE HYDROCHLORIDE 5 MG/1
10 TABLET ORAL EVERY 4 HOURS PRN
Status: DISCONTINUED | OUTPATIENT
Start: 2019-10-12 | End: 2019-10-13 | Stop reason: HOSPADM

## 2019-10-12 RX ORDER — OXYCODONE HYDROCHLORIDE 10 MG/1
5 TABLET ORAL EVERY 6 HOURS PRN
Qty: 7 TABLET | Refills: 0 | Status: SHIPPED | OUTPATIENT
Start: 2019-10-12 | End: 2019-10-12

## 2019-10-12 RX ORDER — GABAPENTIN 300 MG/1
900 CAPSULE ORAL 3 TIMES DAILY
Start: 2019-10-12 | End: 2020-05-22

## 2019-10-12 RX ORDER — OXYCODONE HYDROCHLORIDE 5 MG/1
10 TABLET ORAL EVERY 6 HOURS
Status: DISCONTINUED | OUTPATIENT
Start: 2019-10-12 | End: 2019-10-12

## 2019-10-12 RX ORDER — CEFDINIR 125 MG/5ML
300 POWDER, FOR SUSPENSION ORAL 2 TIMES DAILY
Qty: 60 ML | Refills: 0 | Status: SHIPPED | OUTPATIENT
Start: 2019-10-12 | End: 2020-03-13

## 2019-10-12 RX ADMIN — HEPARIN, PORCINE (PF) 10 UNIT/ML INTRAVENOUS SYRINGE 3 ML: at 10:41

## 2019-10-12 RX ADMIN — GABAPENTIN 900 MG: 300 CAPSULE ORAL at 14:14

## 2019-10-12 RX ADMIN — FLUTICASONE FUROATE AND VILANTEROL TRIFENATATE 1 PUFF: 100; 25 POWDER RESPIRATORY (INHALATION) at 07:45

## 2019-10-12 RX ADMIN — DEXTRAN 70, AND HYPROMELLOSE 2910 2 DROP: 1; 3 SOLUTION/ DROPS OPHTHALMIC at 10:22

## 2019-10-12 RX ADMIN — ERYTHROMYCIN ETHYLSUCCINATE 400 MG: 400 SUSPENSION ORAL at 17:37

## 2019-10-12 RX ADMIN — GABAPENTIN 900 MG: 300 CAPSULE ORAL at 20:28

## 2019-10-12 RX ADMIN — DIAZEPAM 2 MG: 2 TABLET ORAL at 14:14

## 2019-10-12 RX ADMIN — SENNOSIDES 8.6 MG: 8.6 TABLET, FILM COATED ORAL at 09:13

## 2019-10-12 RX ADMIN — OXYCODONE HYDROCHLORIDE 10 MG: 5 TABLET ORAL at 23:36

## 2019-10-12 RX ADMIN — OMEPRAZOLE 40 MG: 20 CAPSULE, DELAYED RELEASE ORAL at 09:13

## 2019-10-12 RX ADMIN — Medication 2000 MG: at 09:14

## 2019-10-12 RX ADMIN — DIAZEPAM 2 MG: 2 TABLET ORAL at 22:48

## 2019-10-12 RX ADMIN — ACETAMINOPHEN 650 MG: 325 TABLET, FILM COATED ORAL at 20:36

## 2019-10-12 RX ADMIN — FAMOTIDINE 20 MG: 20 TABLET ORAL at 20:28

## 2019-10-12 RX ADMIN — ERYTHROMYCIN ETHYLSUCCINATE 400 MG: 400 SUSPENSION ORAL at 09:13

## 2019-10-12 RX ADMIN — DIAZEPAM 2 MG: 2 TABLET ORAL at 03:09

## 2019-10-12 RX ADMIN — DEXTRAN 70, AND HYPROMELLOSE 2910 2 DROP: 1; 3 SOLUTION/ DROPS OPHTHALMIC at 20:28

## 2019-10-12 RX ADMIN — OXYCODONE HYDROCHLORIDE 10 MG: 5 TABLET ORAL at 02:52

## 2019-10-12 RX ADMIN — CEFDINIR 300 MG: 125 POWDER, FOR SUSPENSION ORAL at 09:13

## 2019-10-12 RX ADMIN — ACETAMINOPHEN 650 MG: 325 TABLET, FILM COATED ORAL at 09:15

## 2019-10-12 RX ADMIN — DEXTRAN 70, AND HYPROMELLOSE 2910 2 DROP: 1; 3 SOLUTION/ DROPS OPHTHALMIC at 15:38

## 2019-10-12 RX ADMIN — SERTRALINE HYDROCHLORIDE 50 MG: 50 TABLET ORAL at 09:13

## 2019-10-12 RX ADMIN — OXYCODONE HYDROCHLORIDE 10 MG: 5 TABLET ORAL at 05:52

## 2019-10-12 RX ADMIN — SENNOSIDES 8.6 MG: 8.6 TABLET, FILM COATED ORAL at 20:28

## 2019-10-12 RX ADMIN — ACETAMINOPHEN 650 MG: 325 TABLET, FILM COATED ORAL at 15:37

## 2019-10-12 RX ADMIN — ERYTHROMYCIN ETHYLSUCCINATE 400 MG: 400 SUSPENSION ORAL at 12:46

## 2019-10-12 RX ADMIN — CELECOXIB 100 MG: 100 CAPSULE ORAL at 09:13

## 2019-10-12 RX ADMIN — FAMOTIDINE 20 MG: 20 TABLET ORAL at 09:15

## 2019-10-12 RX ADMIN — OXYCODONE HYDROCHLORIDE 10 MG: 5 TABLET ORAL at 17:37

## 2019-10-12 RX ADMIN — GABAPENTIN 900 MG: 300 CAPSULE ORAL at 09:13

## 2019-10-12 RX ADMIN — ACETAMINOPHEN 650 MG: 325 TABLET, FILM COATED ORAL at 02:53

## 2019-10-12 RX ADMIN — ASPIRIN 81 MG: 81 TABLET ORAL at 09:15

## 2019-10-12 RX ADMIN — OXYCODONE HYDROCHLORIDE 10 MG: 5 TABLET ORAL at 10:41

## 2019-10-12 RX ADMIN — CEFDINIR 300 MG: 125 POWDER, FOR SUSPENSION ORAL at 20:28

## 2019-10-12 NOTE — PROGRESS NOTES
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 who was admitted 10/1 in preparation for for planned surgical procedures (10/2) with ENT -- Dr. Guy (tonsillectomy) and Ortho -- Dr. Franco (right elbow flexor pronator plasty, right elbow brachialis lengthening, right elbow biceps tenotomy, right thumb thenar release). Now POD#10. Chest pain and infiltrates c/w acute chest improving. On all oral medications. Plan to discharge in AM.     Changes Today:  - Quick brain MRI and neurology consult obtained for BLE weakness/numbness concerning for stroke-- evaluation negative  - Discussed with her primary team at [facility] who report no additional workup and historically large component of psycho-social distress surrounding discharge  - Changed oxycodone --> oxycontin for longer lasting pain control, switched prns from q4 --> q6h  - Plan to discharge in AM.     Hematology  History of left MCA stroke  Hypercoagulability  History of TIA  - Continue home ASA 81 mg qDay     HbSS  - Continue home deferasirox 900 mg daily (if family brings from home)  - Continue home erythromycin 400 mg TID  - Continue home hydroxyurea 2000 mg daily  - AM CBC with retic     Neuro  Chronic pain  Acute post-operative pain  - PACCT team consulted, appreciate recs  - PT consulted for early ambulation  - Oxycontin 20 mg PO Q12H with 10 mg q6h oxycodone prn (must be given 2 hours apart)  - Scheduled tylenol q6h PO  - Celebrex 100 mg BID  - Diazepam oral 2 mg q6h PRN for muscle spasms  - Continue gabapentin 900mg TID  - Continue Zoloft 50mg daily    Bilateral lower extremity weakness  - s/p quick brain MRI without evidence of acute stroke  - Neurology  "consulted, appreciate recs   - pattern inconsistent with stroke or other neurologic deficit   - able to ambulate    ENT  S/p bilateral tonsillectomy  - 10 mg decadron IV x3 doses, now complete. s/p 5 days of prednisolone taper.  - 2% viscous lidocaine rinses q2h prn  - Soft diet x 2 weeks  - ENT must be paged immediately for any spitting up or coughing of blood     Resp  Asthma  - Continue home albuterol 2 puffs q4h prn   - On symbicort BID at home. Started on Breo Elipta 1 puff daily.  - PT consult placed and encouraged incentive spirometry  - O2 sats >94% on RA. O2 mask PRN for desaturations.    Acute chest syndrome vs. Infection  - Ceftriaxone 2 g q24h --> cefdinir 300 mg BID  - Azithromycin 500 mg x 1 followed by 250 mg q24h for total of 5 doses-- will ent today  - O2 for comfort     FEN/GI  - Soft diet x 2 weeks  - Famotidine 20mg BID  - Miralax 17g daily  - Senokot 8.6mg daily     Access: Port  Disposition: Pending post-operative recovery    Disposition Plan   Expected discharge: tomorrow, recommended to prior living arrangement once adequate pain management/ tolerating PO medications.    The patient's care was discussed with Dr. Arely Pope MD  Pediatric PGY3    Physician Attestation   I, Arely Mcgill MD, MD, saw this patient with the resident and agree with the resident/fellow's findings and plan of care as documented in the note.      I personally reviewed vital signs, medications, labs and imaging.    Arely Mcgill MD, MD  Date of Service (when I saw the patient): 10/12/19      Interval History   Patient developed numbness of bilateral LE overnight. This spared her feet but extended up to her hips. She was able to get out of bed to the comode \"but that's it. I can't do anything else with my legs.\" Per patient \"weird things happen when I  Have a pain crisis.\" Not sure what this could be. Feels different from her neuropathic pain. No fevers. Continued respiratory " improvement.     Data reviewed today: I reviewed all medications, new labs and imaging results over the last 24 hours. I personally reviewed no images or EKG's today.    Physical Exam   Vital Signs: Temp: 99  F (37.2  C) Temp src: Oral BP: 122/87 Pulse: 92 Heart Rate: 101 Resp: 20 SpO2: 99 % O2 Device: None (Room air)    Weight: 158 lbs 1.12 oz     Constitutional: More awake and alert compared to previous days. Tearful, intermittently hyperventilating.   Eyes: EOMs grossly intact, sclera clear, conjunctiva normal  ENT: Normocephalic, without obvious abnormality, atraumatic, oral pharynx w/ expected granulation tissue, moist mucous membranes. No active bleeding.   Lymphatic: No significant cervical or supraclavicular lymphadenopathy.  Respiratory: no increased WOB, normal RR, fair aeration, clear to auscultation bilaterally, no crackles or wheezing. Complains of pain at lower left lateral chest  Cardiovascular: Regular rate and rhythm, normal S1 and S2, left radial pulse normal, no significant edema  GI: Low to normal active bowel sounds, mild distended and TTP, mild hepatosplenomegaly  Extremities: Right arm immobilized in slight flexion  Neurologic: Awake, alert, oriented, cranial nerves grossly intact, able to dorsiflex and plantar flex left leg. Does not lift against gravity. Right foot unable to dorsiflex/plantarflex at baseline 2/2 prior stroke. Sensation intact on feet but does not have sensation on remainder of leg up to him.     Data   Recent Labs   Lab 10/11/19  0519 10/10/19  0559 10/09/19  0630  10/07/19  1051   WBC 19.8* 18.4* 16.9*   < > 19.2*   HGB 9.3* 8.4* 9.1*   < > 9.4*   MCV 89 91 91   < > 91    231 225   < > 216   NA  --   --  141  --  143   POTASSIUM  --   --  4.3  --  3.2*   CHLORIDE  --   --  104  --  110*   CO2  --   --  31  --  27   BUN  --   --  5*  --  6*   CR  --   --  0.54  --  0.45*   ANIONGAP  --   --  6  --  6   MICAH  --   --  8.0*  --  7.6*   GLC  --   --  105*  --  93    < > =  values in this interval not displayed.     Medications       acetaminophen  650 mg Oral Q6H     aspirin  81 mg Oral Daily     cefdinir  300 mg Oral BID     celecoxib  100 mg Oral Daily     deferasirox  900 mg Oral Daily     erythromycin  400 mg Oral TID AC     famotidine  20 mg Oral BID     fluticasone-vilanterol  1 puff Inhalation Daily     gabapentin  900 mg Oral TID     heparin  5 mL Intracatheter Q28 Days     heparin lock flush  3-6 mL Intracatheter Q24H     hydroxyurea  2,000 mg Oral Daily     hypromellose-dextran  2 drop Left Eye TID     omeprazole  40 mg Oral Daily     oxyCODONE  20 mg Oral Q12H     sennosides  8.6 mg Oral BID     sertraline  50 mg Oral Daily     sodium chloride (PF)  10 mL Intracatheter Q28 Days

## 2019-10-12 NOTE — CONSULTS
Neurology Consultation    Jennifer Cervantes MRN# [id number]   YOB: 2000 Age: 19 year old   Date of Admission: 10/1/2019     Reason for consult: Neurological change, sensory disturbance in the lower extremities.           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 New symptoms of sensory disturbance of unclear cause and can not be localized suggesting functional component. MRI of the brain showed no evidence for acute pathology. 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  No additional neurological work up is recommended.   Agree with PACT team involvement.  PT eval and treatment.    I spent total of 30 minutes in face-to-face during today's visit. Over 50% of this time was spent counseling the patient and coordinating care. See note for details.    Yobani Velasquez MD  [phone]       Chief Complaint:   Pain in legs         History of Present Illness:   This patient is a 19 year old female who presents with sicle cell disease who was admitted for elective procedures for tonsilectomy and right UE tendon lengthening and plasty. She was stable and on her way for recovery but last night developed some pain and sensory disturbance in her LE. She reports that she can not feel them, however is able to feel her feet bilaterally. She has normal bowel and bladder function but does have occasional urine incontinence. She has no changes in her upper extremities and no changes in her mental status.             Past Medical History:     Past Medical History:   Diagnosis Date      Anemia      Anxiety      Bleeding disorder (H)      Blood clotting disorder (H)      Cerebral infarction (H)      Depressive disorder      Migraines      Uncomplicated asthma              Past Surgical History:     Past Surgical History:   Procedure Laterality Date     REPAIR TENDON ELBOW Right 10/2/2019    Procedure: Right Elbow Flexor Lengthening, Flexor Pronator Slide Of Wrist and Finger, Thumb Adductor Lengthening;  Surgeon: Anai Franco MD;  Location: [facility] OR     TONSILLECTOMY Bilateral 10/2/2019    Procedure: Bilateral Tonsillectomy;  Surgeon: Farhana Guy MD;  Location: [facility] OR               Social History:   I have reviewed this patient's social history          Family History:   Non-contributory          Immunizations:     Immunization History   Administered Date(s) Administered     Influenza Vaccine IM > 6 months Valent IIV4 09/25/2019             Allergies:     Allergies   Allergen Reactions     Fish-Derived Products Hives     Seafood Hives     Diagnostic X-Ray Materials      PN: sickle cell     Fish Allergy      Gadolinium Other (See Comments)     Tongue swelling             Medications:     Current Facility-Administered Medications   Medication     acetaminophen (TYLENOL) tablet 650 mg     albuterol (PROAIR HFA/PROVENTIL HFA/VENTOLIN HFA) 108 (90 Base) MCG/ACT inhaler 2 puff     aspirin EC tablet 81 mg     cefdinir (OMNICEF) suspension 300 mg     celecoxib (celeBREX) capsule 100 mg     deferasirox (JADENU) tablet 900 mg - PATIENT SUPPLIED MEDICATION     diazepam (VALIUM) tablet 2 mg     EPINEPHrine (ADRENALIN) kit 0.3 mg     erythromycin ethylsuccinate (ERYPED) suspension 400 mg     famotidine (PEPCID) tablet 20 mg     fluticasone-vilanterol (BREO ELLIPTA) 100-25 MCG/INH inhaler 1 puff     gabapentin (NEURONTIN) capsule 900 mg     heparin 100 UNIT/ML injection 5 mL     heparin lock flush 10 UNIT/ML injection 3-6 mL     heparin lock flush 10 UNIT/ML injection 3-6 mL     hydroxyurea  (HYDREA/DROXIA) suspension CHEMOTHERAPY 2,000 mg     hydrOXYzine (ATARAX) tablet 25 mg     hypromellose-dextran (ARTIFICAL TEARS) 0.1-0.3 % ophthalmic solution 2 drop     lidocaine (LMX4) cream     lidocaine (XYLOCAINE) 2 % solution 5 mL     lidocaine 1 % 0.1-1 mL     naloxone (NARCAN) injection 0.1-0.4 mg     omeprazole (priLOSEC) CR capsule 40 mg     oxyCODONE (oxyCONTIN) 12 hr tablet 20 mg     oxyCODONE (ROXICODONE) tablet 10 mg     phenol (CHLORASEPTIC) 1.4 % spray 1 mL     sennosides (SENOKOT) tablet 8.6 mg     sertraline (ZOLOFT) tablet 50 mg     sodium chloride (PF) 0.9% PF flush 0.2-10 mL     sodium chloride (PF) 0.9% PF flush 10 mL             Review of Systems:   The 10 point Review of Systems is negative other than noted in the HPI         Physical Exam:   Temp: 99  F (37.2  C) Temp src: Oral BP: 122/87 Pulse: 92 Heart Rate: 101 Resp: 20 SpO2: 99 % O2 Device: None (Room air)    General:  alert and normally responsive  Skin:  no abnormal markings; normal color without significant rash.  No jaundice  Head/Neck  normal anterior and posterior fontanelle, intact scalp; Neck without masses.  Eyes  normal red reflex  Ears/Nose/Mouth:  intact canals, patent nares, mouth normal  Thorax:  normal contour, clavicles intact  Lungs:  clear, no retractions, no increased work of breathing  Trunk/Spine  straight, intact  Musculoskeletal:  Normal Harrison and Ortolani maneuvers.  intact without deformity.  Normal digits.  Neurologic:  Awake, mildly distressed, anxious. She was able to provide history.   While in supine she did not move her LE but was able to move them when repositined and asked to stand up. She was able to stand with support, would not make steps, she had a good weight bearing but did not tolerate standing for extended period of time. Sensory examination showed reduced or lack of sensation in the LE up to mid pelvic but normal in both feet and ankles.   DTR's were present and brisk on both sides  R>L.  Plantar response was extensor on the right and flexor on the left. Right UE in the cast. The left UE showed normal reflexes.          Data:   No results found for this or any previous visit (from the past 24 hour(s)).    MRI of the brain was obtained and showed no acute changes. There is evidence for left-sided chronic stroke resulted in encephalomalecia.

## 2019-10-12 NOTE — PLAN OF CARE
PT Unit 5: Kareen seen by PT today with focus on continued independence in mobility. Patient still unsteady with ambulation and requires CGA-Crystal with balance though does not appear to be in increased pain upon conversation and ambulation. Her gait speed is decreased from evaluation and baseline with patient stating she feels unsafe and unsteady. Patient did report a near fall while in room yesterday - encouraged patient to take her time and turn her lights on in room to assist in fall prevention. Will continue to follow patient throughout IP stay for progression of balance and independent mobility.    Pavithra Pagan, PT, DPT

## 2019-10-12 NOTE — PROGRESS NOTES
Unable to see patient today. Will check in on Monday with patient to continue offering services as is clinically possible.    Radha Kerns APRN, CPNP  Pediatric Nurse Practitioner  [facility]

## 2019-10-12 NOTE — PLAN OF CARE
VSS throughout the shift. Pt reported that legs were numb but feet were not. MD aware, ordered brain MRI/neuro consult, no concerns were reported to RN. Pt rated pain 10/10 all day, MD notified, oxycodone changed to ER OxyContin , med could not be crushed and pt refused to swallow pill. Pt decided to stay on scheduled oxy Q6hr and prn oxy Q4hr. Scheduled oxy given as well as valium x1 for pain, pt reported no change but RN walked in a few times when pt was sleeping peacefully. Pt had one incontinence event. RN frequently asked pt to try to get up to the commode and try and pt refused. Pt tolerating PO intake. Will continue to monitor and notify MD with any changes

## 2019-10-12 NOTE — PLAN OF CARE
AVSS, running on the higher side for temps in the 99 F range. Pain has been major issue; typically reporting 9-10/10. Have been administering scheduled oxy and prn meds, while applying heat and distraction methods. Pain has mainly been in legs and rib cage on left side, has received prn oxy, prn atarax, and prn valium, and was given aromatherapy-induced massage with oils. Has good oral intake, but encouraging to drink more fluids. Had one incontinence episode. Lung sounds are diminished at bases, encouraging IS and deep breathing. Will continue to monitor pt status and update provider with any changes.

## 2019-10-13 VITALS
RESPIRATION RATE: 18 BRPM | OXYGEN SATURATION: 99 % | WEIGHT: 158.07 LBS | SYSTOLIC BLOOD PRESSURE: 123 MMHG | BODY MASS INDEX: 26.69 KG/M2 | TEMPERATURE: 98.1 F | DIASTOLIC BLOOD PRESSURE: 76 MMHG | HEART RATE: 111 BPM

## 2019-10-13 LAB
BACTERIA SPEC CULT: NO GROWTH
Lab: NORMAL
SPECIMEN SOURCE: NORMAL

## 2019-10-13 PROCEDURE — 25000128 H RX IP 250 OP 636: Performed by: STUDENT IN AN ORGANIZED HEALTH CARE EDUCATION/TRAINING PROGRAM

## 2019-10-13 PROCEDURE — 25000132 ZZH RX MED GY IP 250 OP 250 PS 637: Performed by: PEDIATRICS

## 2019-10-13 PROCEDURE — 94640 AIRWAY INHALATION TREATMENT: CPT

## 2019-10-13 PROCEDURE — 25000132 ZZH RX MED GY IP 250 OP 250 PS 637: Performed by: STUDENT IN AN ORGANIZED HEALTH CARE EDUCATION/TRAINING PROGRAM

## 2019-10-13 PROCEDURE — 40000275 ZZH STATISTIC RCP TIME EA 10 MIN

## 2019-10-13 RX ORDER — HEPARIN SODIUM,PORCINE 10 UNIT/ML
3-6 VIAL (ML) INTRAVENOUS EVERY 24 HOURS
Status: DISCONTINUED | OUTPATIENT
Start: 2019-10-13 | End: 2019-10-13 | Stop reason: HOSPADM

## 2019-10-13 RX ORDER — HEPARIN SODIUM (PORCINE) LOCK FLUSH IV SOLN 100 UNIT/ML 100 UNIT/ML
5 SOLUTION INTRAVENOUS
Status: DISCONTINUED | OUTPATIENT
Start: 2019-10-13 | End: 2019-10-13 | Stop reason: HOSPADM

## 2019-10-13 RX ORDER — HEPARIN SODIUM,PORCINE 10 UNIT/ML
3-6 VIAL (ML) INTRAVENOUS
Status: DISCONTINUED | OUTPATIENT
Start: 2019-10-13 | End: 2019-10-13 | Stop reason: HOSPADM

## 2019-10-13 RX ORDER — LIDOCAINE 40 MG/G
CREAM TOPICAL
Status: DISCONTINUED | OUTPATIENT
Start: 2019-10-13 | End: 2019-10-13 | Stop reason: HOSPADM

## 2019-10-13 RX ADMIN — OXYCODONE HYDROCHLORIDE 10 MG: 5 TABLET ORAL at 12:32

## 2019-10-13 RX ADMIN — FAMOTIDINE 20 MG: 20 TABLET ORAL at 09:13

## 2019-10-13 RX ADMIN — SENNOSIDES 8.6 MG: 8.6 TABLET, FILM COATED ORAL at 09:11

## 2019-10-13 RX ADMIN — HYDROXYZINE HYDROCHLORIDE 25 MG: 25 TABLET, FILM COATED ORAL at 01:47

## 2019-10-13 RX ADMIN — CELECOXIB 100 MG: 100 CAPSULE ORAL at 09:12

## 2019-10-13 RX ADMIN — OMEPRAZOLE 40 MG: 20 CAPSULE, DELAYED RELEASE ORAL at 09:12

## 2019-10-13 RX ADMIN — Medication 2000 MG: at 09:08

## 2019-10-13 RX ADMIN — DEXTRAN 70, AND HYPROMELLOSE 2910 2 DROP: 1; 3 SOLUTION/ DROPS OPHTHALMIC at 14:08

## 2019-10-13 RX ADMIN — GABAPENTIN 900 MG: 300 CAPSULE ORAL at 09:12

## 2019-10-13 RX ADMIN — ACETAMINOPHEN 650 MG: 325 TABLET, FILM COATED ORAL at 02:44

## 2019-10-13 RX ADMIN — SERTRALINE HYDROCHLORIDE 50 MG: 50 TABLET ORAL at 09:13

## 2019-10-13 RX ADMIN — ASPIRIN 81 MG: 81 TABLET ORAL at 09:13

## 2019-10-13 RX ADMIN — Medication 5 ML: at 15:13

## 2019-10-13 RX ADMIN — FLUTICASONE FUROATE AND VILANTEROL TRIFENATATE 1 PUFF: 100; 25 POWDER RESPIRATORY (INHALATION) at 07:50

## 2019-10-13 RX ADMIN — DEXTRAN 70, AND HYPROMELLOSE 2910 2 DROP: 1; 3 SOLUTION/ DROPS OPHTHALMIC at 09:12

## 2019-10-13 RX ADMIN — HEPARIN, PORCINE (PF) 10 UNIT/ML INTRAVENOUS SYRINGE 3 ML: at 10:24

## 2019-10-13 RX ADMIN — ERYTHROMYCIN ETHYLSUCCINATE 400 MG: 400 SUSPENSION ORAL at 09:12

## 2019-10-13 RX ADMIN — CEFDINIR 300 MG: 125 POWDER, FOR SUSPENSION ORAL at 09:13

## 2019-10-13 RX ADMIN — GABAPENTIN 900 MG: 300 CAPSULE ORAL at 14:08

## 2019-10-13 RX ADMIN — ERYTHROMYCIN ETHYLSUCCINATE 400 MG: 400 SUSPENSION ORAL at 12:32

## 2019-10-13 RX ADMIN — ACETAMINOPHEN 650 MG: 325 TABLET, FILM COATED ORAL at 09:11

## 2019-10-13 NOTE — PROGRESS NOTES
Discharge instructions reviewed with patient. Patient verbalized understanding of discharge instructions. Discharged to home with family.

## 2019-10-13 NOTE — DISCHARGE INSTRUCTIONS
Please wean off Oxycodone based on the following schedule:  10/13/19- 7 mg every 3-4 hours  10/14/19- 6 mg every 3-4 hours  10/15/19- 5 mg every 3-4 hours  10/16/19- 4 mg every 3-4 hours  10/17/19- 3 mg every 3-4 hours  10/18/19- 2 mg every 3-4 hours  10/19/19- 1 mg every 3-4 hours  10/20/19- 0

## 2019-10-13 NOTE — PLAN OF CARE
Pt's vitals stable throughout the shift. Pt tolerating PO intake. Pt rating pain 8/10 instead of 10/10, scheduled pain meds given, no prn meds given. Pt able to stand and walk around with stand by assist. Pt stable to discharge home.

## 2019-10-13 NOTE — PLAN OF CARE
AVSS. Pt continues to report 10/10 pain. Given prn valium x1 within 30 minutes of sched. Oxy. Pt also received prn atarax d/t no change in pain. Respirations are appropriate and she is satting well. Held 0545 dose of oxy d/t shallow respirations and decreased arousal to stimulation. Plan is for pt to go home today, she is quite anxious about going home and it has caused her to increase her pain and agitation. Pt had one incontinence episode. Will continue to monitor pt status and update provider with any changes.

## 2019-10-13 NOTE — PROGRESS NOTES
10/13/19 1544   Child Life   Location Med/Surg   Intervention Supportive Check In  (This CCLS introduced self and services to patient. Patient familiar with child life services from [facility]. Patient alone in room; on personal phone. Patient declined having CFL needs at this time. Patient informed CCLS of discharge today. )   Anxiety Low Anxiety   Outcomes/Follow Up Continue to Follow/Support

## 2019-10-13 NOTE — DISCHARGE SUMMARY
[facility], [place]    Discharge Summary  Pediatric Hematology / Oncology    Date of Admission:  10/1/2019  Date of Discharge:  10/13/2019  Discharging Provider: Tati Pope    Discharge Diagnoses   HbSS Sickle Cell Disease  S/p Tonsillectomy  S/p Tendon Lengthening Surgery  Acute Chest Syndrome  Acute on Chronic Pain    History of Present Illness   Jennfier Cervantes is an 19 year old female with HbSS Sickle Cell disease, history of left MCA stroke with resulting right hemiparesis, and chronic pain who presented 10/1 for pre- and post-operative care. For more details, see H&P dated 10/1.    Hospital Course   Jennifer Cervantes was admitted on 10/1/2019.  The following problems were addressed during her hospitalization:    HbSS Sickle Cell Disease: Jennifer was admitted pre-operatively. Her hemoglobin was checked (she had received 3U PRBCs the day prior to admission per chronic transfusion protocol) and was found to be 11.8. She was started on fluids and continued on her home aspirin. She tolerated anesthesia well. She was restarted on hydroxyurea (liquid), erythromycin. Family did not bring her desferasirox, so this should be restarted when she returns home. We recommend follow up within one week.     Tonsillectomy: Jennifer underwent bilateral tonsillectomy on 10/2. She tolerated the procedure well with minimal intraoperative bleeding. She had no post-operative bleeding. Per ENT recommendations, she received 24 hours of IV decadron followed by a 5 day prednisolone taper. She was started on a mechanical soft diet and tolerated this well. She should continue this diet for a full two weeks (10/16) post-operatively. She can use chloraseptic spray for pain control. She will follow with ENT on 10/24.     Tendon Lengthening Surgery: Jennifer underwent lengthening of right elbow flexor pronator plasty, right elbow brachialis lengthening, right elbow biceps tenotomy, and right thumb thenar release. Surgery  and recovery were uncomplicated. She is non-weight bearing in her RUE until re-evaluated by Orthopedic surgery on 10/24.    Acute on Chronic Pain: Jennifer's post-operative course was complicated by acute post-operative on chronic pain. Our pain team was consulted on admission. She was started on a hydromorphone PCA post-operatively along with scheduled valium, toradol, tylenol, and as needed atarax. Her PCA was temporarily increased during her episode of acute chest (see below) and then weaned as tolerated. She was on all oral pain medications for approximately 48 hours prior to discharge.     Jennifer was discharged with instructions to continue her home BID celebrex, increase gabapentin to 900 mg TID, continue tylenol q6h, and wean oxycodone 10 mg q3-4h by 1 pill per day. She should continue to follow up with her previously established pain clinic.     Acute Chest Syndrome: Jennifer was continued on her home asthma medications on admission and was encouraged to perform incentive spirometry and ambulate out of bed immediately post operatively. Unfortunately, on post-operative day #5, Jennifer developed left sided chest pain. She was found to have patchy infiltrates bilaterally on CXR. IV Ceftriaxone and azithromycin were started. She saturated well and had a benign respiratory examination, but LFNC was used for comfort. She completed a 5 day course of azithromycin. She demonstrated improvement in her infiltrates on follow up Xray. Given an acute drop in hemoglobin, she was transfused 5 mL/kg with further clinical improvement. She will complete a 10 day course of antibiotics with PO cefdinir after discharge.     Tati Pope MD    Physician Attestation   I, Arely Mcgill MD, saw and evaluated this patient prior to discharge.  I discussed the patient with the resident/fellow and agree with plan of care as documented in the note.      I personally reviewed vital signs, medications, labs and imaging.    I  personally spent 45 minutes on discharge activities.    Arely Mcgill MD, MD  Date of Service (when I saw the patient): 10/13/19        Pending Results     Unresulted Labs Ordered in the Past 30 Days of this Admission     Date and Time Order Name Status Description    10/7/2019 0959 Blood culture Preliminary           Primary Care Physician   KWASI CONNLELY      Physical Exam   Vital Signs with Ranges  Temp:  [98.3  F (36.8  C)-100  F (37.8  C)] 98.3  F (36.8  C)  Pulse:  [] 92  Heart Rate:  [100-101] 100  Resp:  [16-20] 16  BP: (111-131)/(64-87) 127/83  SpO2:  [94 %-100 %] 100 %  I/O last 3 completed shifts:  In: 260 [P.O.:180; I.V.:80]  Out: 1150 [Urine:850; Other:300]    Exam by Dr. Zaynab Jj, PGY-1  Constitutional: More awake and alert compared to previous days. Appropriately smiling with more articulation of words  Eyes: EOMs grossly intact, sclera clear, conjunctiva normal  ENT: Normocephalic, without obvious abnormality, atraumatic, oral pharynx w/ expected granulation tissue, moist mucous membranes. No active bleeding.   Respiratory: no increased WOB, normal RR, clear auscultation bilaterally, no crackles or wheezing.   Cardiovascular: Regular rate and rhythm, normal S1 and S2, radial pulses normal, no significant edema  GI: Low to normal active bowel sounds, mild distended and TTP, mild hepatosplenomegaly  Extremities: Right arm immobilized in slight flexion  Neurologic: Awake, alert, oriented, cranial nerves grossly intact, able to dorsiflex and plantar flex left leg. Does not lift against gravity. Right foot unable to dorsiflex/plantarflex at baseline 2/2 prior stroke.         Discharge Disposition   Discharged to home  Condition at discharge: Stable    Consultations This Hospital Stay   PEDS PACCT (PAIN AND ADVANCED/COMPLEX CARE TEAM) IP CONSULT  ADVANCE DIRECTIVE IP CONSULT  PHYSICAL THERAPY PEDS IP CONSULT  PEDS INTEGRATIVE HEALTH IP CONSULT  PHYSICAL THERAPY PEDS IP CONSULT  PEDS  INTEGRATIVE HEALTH IP CONSULT  PEDS NEUROLOGY IP CONSULT     Discharge Orders      Reason for your hospital stay    Jennifer was hospitalized for removal of her tonsils and for tendon lengthening surgery of her right arm.     Follow Up and recommended labs and tests    Follow up with Dr. Guy of ENT on 10/24 at 9 am.   Follow up with Dr. Oneal of Orthopedic Surgery on 10/24 at 9:50 am.   Follow up with Dr. Collins within one week.     Activity    Your activity upon discharge: do not bear any weight with your right arm. It is ok to move your right finger.     Wound care and dressings    Instructions to care for your wound at home: keep right arm/cast clean and dry.     When to contact your care team    Contact your primary hematology team for new chest pain, shortness of breath, bleeding, neurologic changes.     Contact ENT with any oral bleeding.    Contact orthopedic surgery with arm pain, numbness, surgical site concerns.     Discharge Instructions    Wean oxycodone by one dose per day:    10/13 - 7 doses  10/14 - 6 doses  10-15 - 5 does  10/16 - 4 doses  10/17 - 3 doses  10/18 - 2 doses  10/19 - 1 dose  10/20 - 0 doses     Diet    Follow this diet upon discharge: soft diet (yogurt, pudding, applesauce) until 10/16. Then regular diet as tolerated.     Discharge Medications   Current Discharge Medication List      START taking these medications    Details   cefdinir (OMNICEF) 125 MG/5ML suspension Take 12 mLs (300 mg) by mouth 2 times daily  Qty: 60 mL, Refills: 0    Associated Diagnoses: Hb-SS disease with acute chest syndrome (H)      hydrOXYzine (ATARAX) 25 MG tablet Take 1 tablet (25 mg) by mouth every 6 hours as needed (pain)  Qty: 30 tablet, Refills: 0    Associated Diagnoses: Acute post-operative pain      phenol (CHLORASEPTIC) 1.4 % spray Take 1 spray (1 mL) by mouth every hour as needed for sore throat  Qty: 1 Bottle, Refills: 0    Associated Diagnoses: Acute post-operative pain         CONTINUE these  medications which have CHANGED    Details   gabapentin (NEURONTIN) 300 MG capsule Take 3 capsules (900 mg) by mouth 3 times daily    Associated Diagnoses: Chronic pain syndrome      oxyCODONE IR (ROXICODONE) 10 MG tablet Take 1 tablet by mouth every 3-4 hours as needed for severe pain. Decrease total number of doses per day by one until off of oxycodone.  Qty: 25 tablet, Refills: 0    Associated Diagnoses: Acute post-operative pain; Hb-SS disease with acute chest syndrome (H)         CONTINUE these medications which have NOT CHANGED    Details   acetaminophen (TYLENOL) 325 MG tablet Take 2 tablets (650 mg) by mouth every 6 hours as needed for mild pain      aspirin (ASA) 81 MG tablet Take 1 tablet (81 mg) by mouth daily      Budesonide-Formoterol Fumarate (SYMBICORT IN) Inhale  into the lungs.      !! deferasirox (JADENU) 180 MG tablet Take 1 tablet (180 mg) by mouth daily With two 360mg tabs for a total daily dose of 900mg      erythromycin ethylsuccinate (E.E.S.) 400 MG tablet Take 1 tablet (400 mg) by mouth 3 times daily Before meals      GNP SENNA-LAX 8.6 MG tablet Take 1 tablet by mouth 2 times daily as needed for constipation      hydroxyurea (HYDREA) 500 MG capsule CHEMO Take 4 capsules (2,000 mg) by mouth daily      !! JADENU 360 MG tablet Take 2 tablets (720 mg) by mouth daily With one 180mg tab for a total daily dose of 900mg      omeprazole (PRILOSEC) 40 MG DR capsule Take 1 capsule (40 mg) by mouth daily      PROAIR  (90 Base) MCG/ACT inhaler Inhale 2 puffs into the lungs every 4 hours as needed for shortness of breath / dyspnea or wheezing    Comments: Pharmacy may dispense brand covered by insurance (Proair, or proventil or ventolin or generic albuterol inhaler)      sertraline (ZOLOFT) 50 MG tablet       EPINEPHrine (EPIPEN) 0.3 MG/0.3ML injection       levonorgestrel (LILETTA, 52 MG,) 19.5 MCG/DAY IUD        !! - Potential duplicate medications found. Please discuss with provider.         Allergies   Allergies   Allergen Reactions     Fish-Derived Products Hives     Seafood Hives     Diagnostic X-Ray Materials      PN: sickle cell     Fish Allergy      Gadolinium Other (See Comments)     Tongue swelling     Data   Most Recent 3 CBC's:  Recent Labs   Lab Test 10/11/19  0519 10/10/19  0559 10/09/19  0630   WBC 19.8* 18.4* 16.9*   HGB 9.3* 8.4* 9.1*   MCV 89 91 91    231 225      Most Recent 3 BMP's:  Recent Labs   Lab Test 10/09/19  0630 10/07/19  1051 10/01/19  1550    143 140   POTASSIUM 4.3 3.2* 3.7   CHLORIDE 104 110* 111*   CO2 31 27 24   BUN 5* 6* 9   CR 0.54 0.45* 0.61   ANIONGAP 6 6 5   MICAH 8.0* 7.6* 7.7*   * 93 99     Most Recent 2 LFT's:  Recent Labs   Lab Test 10/01/19  1550   AST 47*   ALT 47   ALKPHOS 66   BILITOTAL 1.7*     Most Recent INR's and Anticoagulation Dosing History:  Anticoagulation Dose History     There is no flowsheet data to display.        Most Recent 3 Troponin's:No lab results found.  Most Recent Cholesterol Panel:No lab results found.  Most Recent 6 Bacteria Isolates From Any Culture (See EPIC Reports for Culture Details):  Recent Labs   Lab Test 10/07/19  1050   CULT No growth     Most Recent TSH, T4 and A1c Labs:No lab results found.  Results for orders placed or performed during the hospital encounter of 10/01/19   XR Chest Port 1 View    Narrative    EXAM: XR CHEST PORT 1 VW  10/7/2019 10:27 AM     HISTORY:  sickle cell, new left sided lower chest pain       COMPARISON:  None    FINDINGS: AP radiograph of the chest. Low lung volumes. Central venous  catheter tip at cavoatrial junction.    The trachea is midline. The mediastinal border is within normal  limits. The cardiac silhouette is normal. Pulmonary vasculature are  within normal limits. Right upper lung and retrocardiac opacities. No  pneumothorax. No pleural effusion.    The visualized osseous thorax, abdomen and soft tissues appear  unremarkable.      Impression    IMPRESSION: Right  upper lung and retrocardiac opacities, acute chest  syndrome versus infection.    I have personally reviewed the examination and initial interpretation  and I agree with the findings.    KEEGAN PALOMO MD   XR Chest Port 1 View    Narrative    Exam: XR CHEST PORT 1 VW  10/10/2019 9:55 AM      History: hx sickle cell with acute chest and prev pulm opacity. Follow  up cxr    Comparison: 10/7/2019    Findings: Left port terminates over the low SVC. Demonstration of  ill-defined and hazy right upper lung opacities. Patchy retrocardiac  attenuation. Elevation of the right hemidiaphragm noted. No pleural  effusion or pneumothorax. Cardiac silhouette is similar in size. Upper  abdomen is stable.      Impression    Impression: Redemonstration of multifocal opacities, most pronounced  in the right upper lung. Differential is unchanged and includes acute  chest syndrome and infection.    JIM DUTTA MD   MR Brain w/o Contrast    Narrative    MRI of the brain, without contrast    Provided history: Focal neurological deficit. Stroke suspected. Sickle  cell disease. New bilateral numbness and weakness of lower  extremities.    TECHNIQUE: MRI of the brain was performed utilizing axial DWI, axial  FLAIR, axial T1, axial SWI.    COMPARISON: None.    FINDINGS:     There is a large area of encephalomalacia in the left middle cerebral  artery territory. Particularly this area involves the posterior left  basal ganglia, left temporal operculum and left posterior lateral  temporal lobe. FLAIR hyperintensity in left corona radiata reflecting  gliosis. There is ex vacuo  dilatation of the left lateral ventricle  body. On diffusion-weighted imaging there is no evidence of acute  infarction. There is asymmetric volume loss of the left cerebral  peduncle due to Wallerian degeneration. There is cerebellar mild  volume loss.  There is no extra-axial collection.    FINDINGS:    No evidence of acute infarction.    ECHO JARRETT MD

## 2019-10-14 NOTE — PLAN OF CARE
Physical Therapy Discharge Summary    Reason for therapy discharge:    Discharged to home.    Progress towards therapy goal(s). See goals on Care Plan in [facility] electronic health record for goal details.  Goals partially met.  Barriers to achieving goals:   discharge from facility.    Therapy recommendation(s):    No further therapy is recommended.    Pavithra Pagan, PT, DPT

## 2019-10-14 NOTE — PROGRESS NOTES
[facility] was contacted about getting the patient transportation home due to the lack of a ride for dismissal home. Staff reports having the patient problem solve with no results.     [employer] contacted taxi company to set up a ride for the patient for 230pm and patient will be waiting for the taxi at the main entrance.     [employer] communicated to staff about ride set up.     [employer] was paged again regarding the patient and her transportation. The patient has found a family member to come and get her so the ride is able to be canceled.     [employer] contacted [employer] to cancel the ride for the patient.

## 2019-10-15 ENCOUNTER — TRANSFERRED RECORDS (OUTPATIENT)
Dept: HEALTH INFORMATION MANAGEMENT | Facility: CLINIC | Age: 19
End: 2019-10-15

## 2019-10-24 ENCOUNTER — THERAPY VISIT (OUTPATIENT)
Dept: OCCUPATIONAL THERAPY | Facility: CLINIC | Age: 19
End: 2019-10-24
Payer: COMMERCIAL

## 2019-10-24 ENCOUNTER — OFFICE VISIT (OUTPATIENT)
Dept: OTOLARYNGOLOGY | Facility: CLINIC | Age: 19
End: 2019-10-24
Payer: COMMERCIAL

## 2019-10-24 ENCOUNTER — OFFICE VISIT (OUTPATIENT)
Dept: ORTHOPEDICS | Facility: CLINIC | Age: 19
End: 2019-10-24
Payer: COMMERCIAL

## 2019-10-24 VITALS
BODY MASS INDEX: 24.92 KG/M2 | SYSTOLIC BLOOD PRESSURE: 122 MMHG | HEART RATE: 95 BPM | OXYGEN SATURATION: 98 % | HEIGHT: 64 IN | DIASTOLIC BLOOD PRESSURE: 68 MMHG | WEIGHT: 146 LBS

## 2019-10-24 DIAGNOSIS — G81.11 RIGHT SPASTIC HEMIPLEGIA (H): Primary | ICD-10-CM

## 2019-10-24 DIAGNOSIS — Z90.89 S/P TONSILLECTOMY: Primary | ICD-10-CM

## 2019-10-24 DIAGNOSIS — Z47.89 AFTERCARE FOLLOWING SURGERY OF THE MUSCULOSKELETAL SYSTEM: ICD-10-CM

## 2019-10-24 DIAGNOSIS — M25.621 STIFFNESS OF ELBOW JOINT, RIGHT: ICD-10-CM

## 2019-10-24 PROCEDURE — 97165 OT EVAL LOW COMPLEX 30 MIN: CPT | Mod: GO | Performed by: OCCUPATIONAL THERAPIST

## 2019-10-24 PROCEDURE — 97760 ORTHOTIC MGMT&TRAING 1ST ENC: CPT | Mod: GO | Performed by: OCCUPATIONAL THERAPIST

## 2019-10-24 ASSESSMENT — MIFFLIN-ST. JEOR: SCORE: 1417.25

## 2019-10-24 ASSESSMENT — PAIN SCALES - GENERAL: PAINLEVEL: NO PAIN (0)

## 2019-10-24 NOTE — LETTER
10/24/2019       RE: Jennifer Cervantes  [address]  [facility] [address]     Dear Colleague,    Thank you for referring your patient, Jennifer Cervantes, to the [facility] EAR NOSE AND THROAT at [facility]. Please see a copy of my visit note below.    CC: f/u tonsillectomy on 10/2/19    HPI: Patient returns to clinic today for follow-up after tonsillectomy on October 2, 2019.  This was done for recurrent and chronic tonsillitis.  Patient reports that she is currently doing well.  She has no significant sore throat.  She does find a little bit of discomfort when she yawns.  She is staying hydrated and drinking lots of water.    PE:  GEN: nad  O/C: tonsillar fossa is are well-healed.  Palate is symmetric.  No sign of granulation tissue.    A/P:  Patient has healed very well after surgery.  She had minimal complications with surgery.  She did require hospitalization afterwards for pain control but has done a very good job of taking care of herself when she has left the hospital.  At this time she is free to eat a regular diet.  She may resume normal activities from a throat standpoint.  Certainly she would need to speak to her orthopedic surgeon regarding arm restrictions for her right arm surgery.  At this time she does not need routine follow-up with ENT.    Again, thank you for allowing me to participate in the care of your patient.      Sincerely,    Farhana Guy MD

## 2019-10-24 NOTE — PROGRESS NOTES
Hand Therapy Initial Evaluation    Current Date:  10/24/2019    Diagnosis: Right Elbow Flexor Lengthening, Flexor Pronator Slide Of Wrist and Finger, Thumb Adductor Lengthening    DOS: 10/02/19  Procedure:  Right Elbow Flexor Lengthening, Flexor Pronator Slide Of Wrist and Finger, Thumb Adductor Lengthening   Post:  3w 1d    Precautions: NA    Subjective:  Jennifer Cervantes is a 19 year old female.    Patient reports symptoms of the right elbow, wrist, hand, thumb which occurred due to Elbow Flexor Lengthening, Flexor Pronator Slide Of Wrist and Finger, Thumb Adductor Lengthening, secondary to stroke as a toddler. Since onset symptoms are Gradually getting better.  Special tests:  none.  Previous treatment: surgery, post-op dressing.    General health as reported by patient is fair.  Pertinent medical history includes:Asthma, Depression, Implated Device, Mental Illness, Migraines/Headaches, Stroke, Chest Pain, Pain at Night/Rest, Severe Dizziness, Sickle Cell Anemia  Medical allergies:none.  Surgical history: orthopedic: R arm.  Medication history: Anti-depressants, Muscle Relaxants, Pain, Steroids, sleep.    Current occupation is currently looking for something  Currently not working due to present treatment problem  Job Tasks: NA    Occupational Profile Information:  Left hand dominant  Prior functional level:  independent-shared household chores  Patient reports symptoms of pain, stiffness/loss of motion, weakness/loss of strength and edema  Barriers include:transportation  Mobility: No difficulty  Transportation: public transportation and medical transportation  Leisure activities/hobbies: gym    Objective:  Pain Level (Scale 0-10)   10/24/2019   At Rest 7/10   At worst 7/10     Pain Description  Date 10/24/2019   Location fingers   Pain Quality Sharp   Frequency constant     Pain is worst  daytime   Exacerbated by  movement, bumping it   Relieved by rest   Progression improving     Edema  Moderate    Sensation    WNL throughout all nerve distributions; per patient report    ROM  Pain Report: - none  + mild    ++ moderate    +++ severe   Elbow 10/24/2019   AROM (PROM) R   Extension -46   Flexion 95   Supination +   Pronation 75     ROM  Pain Report: - none  + mild    ++ moderate    +++ severe   Wrist 10/24/2019   AROM (PROM) right   Extension (-19)   Flexion NT     ROM  Hand 10/24/2019   AROM(PROM) R   Index MP -6/   PIP -53/   DIP -40/   Long MP 0/   PIP -77/   DIP -43/   Ring MP 0/   PIP -40/   DIP -35/   Small MP 0/   PIP -20/   DIP -28/     Strength  contraindicated    Assessment:  Patient presents with symptoms consistent with diagnosis of right elbow Flexor Lengthening, Flexor Pronator Slide Of Wrist and Finger, Thumb Adductor Lengthening,  with surgical  intervention.     Patient's limitations or Problem List includes:  Pain, Decreased ROM/motion, Increased edema and Weakness of the right elbow, wrist and hand which interferes with the patient's ability to perform Self Care Tasks (dressing, eating, bathing, hygiene/toileting), Work Tasks, Sleep Patterns, Recreational Activities and Household Chores as compared to previous level of function.    Rehab Potential:  Good - Return to full activity, some limitations    Patient will benefit from skilled Occupational Therapy to increase ROM and overall strength and decrease pain, edema and adherence of scarring to return to previous activity level and resume normal daily tasks and to reach their rehab potential.    Barriers to Learning:  No barrier    Communication Issues:  Patient appears to be able to clearly communicate and understand verbal and written communication and follow directions correctly.    Chart Review: Chart Review and Simple history review with patient    Identified Performance Deficits: bathing/showering, toileting, dressing, feeding, hygiene and grooming, driving and community mobility, health management and maintenance, home establishment and management,  meal preparation and cleanup, work and leisure activities    Assessment of Occupational Performance:  5 or more Performance Deficits    Clinical Decision Making (Complexity): Low complexity    Treatment Explanation:  The following has been discussed with the patient:  RX ordered/plan of care  Anticipated outcomes  Possible risks and side effects    Plan:  Frequency:  1 X week, once daily  Duration:  for 6 weeks    Treatment Plan:   Modalities:  US, Fluidotherapy and Paraffin  Therapeutic Exercise:  AROM, AAROM, PROM, Tendon Gliding, Isotonics, Isometrics and Stabilization  Manual Techniques:  Scar mobilization and Myofascial release  Orthotic Fabrication:  Static orthosis  Self Care:  Self Care Tasks    Discharge Plan:  Achieve all LTG.  Independent in home treatment program.  Reach maximal therapeutic benefit.    Home Exercise Program:  Wrist orthosis at neutral during day  Resting hand at night  Long arm-emphasizing extension at night    Next Visit:  Orthosis modifications  AROM  Scar mgmt

## 2019-10-24 NOTE — PROGRESS NOTES
CC: f/u tonsillectomy on 10/2/19    HPI: Patient returns to clinic today for follow-up after tonsillectomy on October 2, 2019.  This was done for recurrent and chronic tonsillitis.  Patient reports that she is currently doing well.  She has no significant sore throat.  She does find a little bit of discomfort when she yawns.  She is staying hydrated and drinking lots of water.    PE:  GEN: nad  O/C: tonsillar fossa is are well-healed.  Palate is symmetric.  No sign of granulation tissue.    A/P:  Patient has healed very well after surgery.  She had minimal complications with surgery.  She did require hospitalization afterwards for pain control but has done a very good job of taking care of herself when she has left the hospital.  At this time she is free to eat a regular diet.  She may resume normal activities from a throat standpoint.  Certainly she would need to speak to her orthopedic surgeon regarding arm restrictions for her right arm surgery.  At this time she does not need routine follow-up with ENT.

## 2019-10-24 NOTE — PROGRESS NOTES
[facility]  Orthopedics  Anai Franco MD  10/24/2019     Name: Jennifer Cervantes  MRN: [id number]  Age: 19 year old  : 2000  Referring provider: Referred Self     Chief Complaint: No chief complaint on file.    Date of Surgery: 10/2/19    Procedures:   1.  Right elbow biceps tenotomy.   2.  Right elbow brachialis lengthening.   3.  Right flexor pronator plasty (wrist and finger lengthening)  4.  Left thumb thenar release (adductor).     History of Present Illness:   Jennifer Cervantes is a 19 year old female 3 weeks status-post above procedures who presents for postoperative evaluation. She reports [facility] evaluation of [facility]. She was unable to sleep.    Physical Examination:  General: Healthy appearing female. Affect appropriate. Normal gait. Alert and oriented to surroundings.   Right Upper Extremity:   Anterior antecubital incision is well healed, suture ends trimmed  Elbow at 45 deg flexion  Medial elbow wound well healed  Skin is dry as expected  Wrist comes to neutral at which time fingers flex  Wrist flexed down to 60 degrees for full finger extension      Assessment:   19 year old female 3 weeks s/p above procedures, satisfactory post operative course    Plan:     Can do scar massage with lotion    Hand therapy, elbow extension and wrist extension splints, WHO    Follow up in 6 weeks    Anai Franco MD   Hand and Upper Extremity Specialist  [facility]      Scribe Disclosure:  Viola NEWMAN, am serving as a scribe to document services personally performed by Anai Franco MD at this visit, based upon the provider's statements to me. All documentation has been reviewed by the aforementioned provider prior to being entered into the official medical record.

## 2019-10-24 NOTE — PATIENT INSTRUCTIONS
Thank you for choosing  [facility].  Please follow up with Dr. Guy as needed.  [phone] appointment scheduling option 1 and nurse advice option 3.

## 2019-10-24 NOTE — NURSING NOTE
Reason For Visit:   Chief Complaint   Patient presents with     Right Elbow - Surgical Followup     Surgical Followup     DOS 10/2/19 Right Elbow Flexor Lengthening, Flexor Pronator Slide Of Wrist and Finger, Thumb Adductor Lengthening        Primary MD: Jeimy Decker  Ref. MD: jolanta     Age: 19 year old    ?  No      LMP  (LMP Unknown)       Pain Assessment  Patient Currently in Pain: Yes  0-10 Pain Scale: 8               QuickDASH Assessment  No flowsheet data found.       Current Outpatient Medications   Medication Sig Dispense Refill     acetaminophen (TYLENOL) 325 MG tablet Take 2 tablets (650 mg) by mouth every 6 hours as needed for mild pain       aspirin (ASA) 81 MG tablet Take 1 tablet (81 mg) by mouth daily       Budesonide-Formoterol Fumarate (SYMBICORT IN) Inhale  into the lungs.       cefdinir (OMNICEF) 125 MG/5ML suspension Take 12 mLs (300 mg) by mouth 2 times daily 60 mL 0     deferasirox (JADENU) 180 MG tablet Take 1 tablet (180 mg) by mouth daily With two 360mg tabs for a total daily dose of 900mg       EPINEPHrine (EPIPEN) 0.3 MG/0.3ML injection        erythromycin ethylsuccinate (E.E.S.) 400 MG tablet Take 1 tablet (400 mg) by mouth 3 times daily Before meals       gabapentin (NEURONTIN) 300 MG capsule Take 3 capsules (900 mg) by mouth 3 times daily       GNP SENNA-LAX 8.6 MG tablet Take 1 tablet by mouth 2 times daily as needed for constipation       hydroxyurea (HYDREA) 500 MG capsule CHEMO Take 4 capsules (2,000 mg) by mouth daily       hydrOXYzine (ATARAX) 25 MG tablet Take 1 tablet (25 mg) by mouth every 6 hours as needed (pain) 30 tablet 0     JADENU 360 MG tablet Take 2 tablets (720 mg) by mouth daily With one 180mg tab for a total daily dose of 900mg       levonorgestrel (LILETTA, 52 MG,) 19.5 MCG/DAY IUD        omeprazole (PRILOSEC) 40 MG DR capsule Take 1 capsule (40 mg) by mouth daily       oxyCODONE IR (ROXICODONE) 10 MG tablet Take 1 tablet by mouth every 3-4 hours as  needed for severe pain. Decrease total number of doses per day by one until off of oxycodone. 25 tablet 0     phenol (CHLORASEPTIC) 1.4 % spray Take 1 spray (1 mL) by mouth every hour as needed for sore throat 1 Bottle 0     PROAIR  (90 Base) MCG/ACT inhaler Inhale 2 puffs into the lungs every 4 hours as needed for shortness of breath / dyspnea or wheezing       sertraline (ZOLOFT) 50 MG tablet          Allergies   Allergen Reactions     Fish-Derived Products Hives     Seafood Hives     Diagnostic X-Ray Materials      PN: sickle cell     Fish Allergy      Gadolinium Other (See Comments)     Tongue swelling       Becki Payne, ATC

## 2019-10-24 NOTE — LETTER
10/24/2019       RE: Jennifer Cervantes  [address]  [place] [facility] [address]     Dear Colleague,    Thank you for referring your patient, Jennifer Cervantes, to the [facility] at [facility]. Please see a copy of my visit note below.    [facility]  Orthopedics  Anai Franco MD  10/24/2019     Name: Jennifer Cervantes  MRN: [id number]  Age: 19 year old  : 2000  Referring provider: Referred Self     Chief Complaint: No chief complaint on file.    Date of Surgery: 10/2/19    Procedures:   1.  Right elbow biceps tenotomy.   2.  Right elbow brachialis lengthening.   3.  Right flexor pronator plasty (wrist and finger lengthening)  4.  Left thumb thenar release (adductor).     History of Present Illness:   Jennifer Cervantes is a 19 year old female 3 weeks status-post above procedures who presents for postoperative evaluation. [facility] of [facility]. She was unable to sleep.    Physical Examination:  General: Healthy appearing female. Affect appropriate. Normal gait. Alert and oriented to surroundings.   Right Upper Extremity:   Anterior antecubital incision is well healed, suture ends trimmed  Elbow at 45 deg flexion  Medial elbow wound well healed  Skin is dry as expected  Wrist comes to neutral at which time fingers flex  Wrist flexed down to 60 degrees for full finger extension      Assessment:   19 year old female 3 weeks s/p above procedures, satisfactory post operative course    Plan:     Can do scar massage with lotion    Hand therapy, elbow extension and wrist extension splints, WHO    Follow up in 6 weeks    Anai Franco MD   Hand and Upper Extremity Specialist  [facility]    Scribe Disclosure:  I, Viola Peterson, am serving as a scribe to document services personally performed by Anai Franco MD at this visit, based upon the provider's statements to me. All  documentation has been reviewed by the aforementioned provider prior to being entered into the official medical record.

## 2019-10-27 PROBLEM — Z47.89 AFTERCARE FOLLOWING SURGERY OF THE MUSCULOSKELETAL SYSTEM: Status: ACTIVE | Noted: 2019-10-27

## 2019-10-29 ENCOUNTER — TELEPHONE (OUTPATIENT)
Dept: ORTHOPEDICS | Facility: CLINIC | Age: 19
End: 2019-10-29

## 2019-10-29 NOTE — TELEPHONE ENCOUNTER
----- Message from Franchesca Tucker OT sent at 10/27/2019  2:34 PM CDT -----  Dr. Joan Beckett wanted Jennifer to come back and see her in 6 weeks, which would be somewhere around December 5th.  Can you help set that up?    Thank you!    -Franchesca

## 2019-10-29 NOTE — TELEPHONE ENCOUNTER
Left basic voice mail to please have Jennifer call [facility] at [phone] and ask for nurse Ally.     (phone number is patient's mother's phone and patient does not want any medical info left on her mother's phone)  Ally Oakley RN

## 2019-11-04 ENCOUNTER — TRANSFERRED RECORDS (OUTPATIENT)
Dept: HEALTH INFORMATION MANAGEMENT | Facility: CLINIC | Age: 19
End: 2019-11-04

## 2019-11-07 NOTE — TELEPHONE ENCOUNTER
"2nd attempt to reach patient at main number listed, the \"phone number was not accepting calls at this time\".  Requested letter to be mailed to patient. Ally Oakley RN    "

## 2019-11-11 ENCOUNTER — TRANSFERRED RECORDS (OUTPATIENT)
Dept: HEALTH INFORMATION MANAGEMENT | Facility: CLINIC | Age: 19
End: 2019-11-11

## 2019-11-25 ENCOUNTER — TRANSFERRED RECORDS (OUTPATIENT)
Dept: HEALTH INFORMATION MANAGEMENT | Facility: CLINIC | Age: 19
End: 2019-11-25

## 2019-11-29 PROBLEM — M25.621 STIFFNESS OF ELBOW JOINT, RIGHT: Status: RESOLVED | Noted: 2019-10-27 | Resolved: 2019-11-29

## 2019-11-29 PROBLEM — Z47.89 AFTERCARE FOLLOWING SURGERY OF THE MUSCULOSKELETAL SYSTEM: Status: RESOLVED | Noted: 2019-10-27 | Resolved: 2019-11-29

## 2019-12-05 ENCOUNTER — TRANSFERRED RECORDS (OUTPATIENT)
Dept: HEALTH INFORMATION MANAGEMENT | Facility: CLINIC | Age: 19
End: 2019-12-05

## 2019-12-10 ENCOUNTER — TRANSFERRED RECORDS (OUTPATIENT)
Dept: HEALTH INFORMATION MANAGEMENT | Facility: CLINIC | Age: 19
End: 2019-12-10

## 2019-12-23 NOTE — TELEPHONE ENCOUNTER
ONCOLOGY INTAKE: Records Information        APPT INFORMATION:  Referring provider:  Dr Jamaal Collins MD  Referring provider s clinic:  [facility]   Reason for visit/diagnosis:  Sickle Cell Anemia  Has patient been notified of appointment date and time?: No, clinic will notify pt     RECORDS INFORMATION:  Were the records received with the referral (via Rightfax)? No     Has patient been seen for any external appt for this diagnosis? yes     If yes, where? [facility]     Has patient had any imaging or procedures outside of [place] for this condition? Yes                                If Yes, where? [facility]     ADDITIONAL INFORMATION:

## 2019-12-26 NOTE — TELEPHONE ENCOUNTER
RECORDS STATUS - ALL OTHER DIAGNOSIS      RECORDS RECEIVED FROM: [facility]   DATE RECEIVED:    NOTES STATUS DETAILS   OFFICE NOTE from referring provider Updated records requested 12/26/19 [facility] in Epic[facility] Dr. Collins    Also seen: [facility] ([facility] - 10/1/19)   OFFICE NOTE from medical oncologist     DISCHARGE SUMMARY from hospital [facility] 10/1/19   DISCHARGE REPORT from the ER     OPERATIVE REPORT     MEDICATION LIST     CLINICAL TRIAL TREATMENTS TO DATE     LABS     PATHOLOGY REPORTS     ANYTHING RELATED TO DIAGNOSIS Updated labs requested from [facility] 12/26/19 In Epic: 10/11/19   GENONOMIC TESTING     TYPE:     IMAGING (NEED IMAGES & REPORT)     CT SCANS     MRI     MAMMO     ULTRASOUND     PET

## 2019-12-27 ENCOUNTER — THERAPY VISIT (OUTPATIENT)
Dept: OCCUPATIONAL THERAPY | Facility: CLINIC | Age: 19
End: 2019-12-27
Payer: COMMERCIAL

## 2019-12-27 DIAGNOSIS — M25.621 STIFFNESS OF ELBOW JOINT, RIGHT: Primary | ICD-10-CM

## 2019-12-27 DIAGNOSIS — Z47.89 AFTERCARE FOLLOWING SURGERY OF THE MUSCULOSKELETAL SYSTEM: ICD-10-CM

## 2019-12-27 PROCEDURE — 97140 MANUAL THERAPY 1/> REGIONS: CPT | Mod: GO | Performed by: OCCUPATIONAL THERAPIST

## 2019-12-27 PROCEDURE — 97110 THERAPEUTIC EXERCISES: CPT | Mod: GO | Performed by: OCCUPATIONAL THERAPIST

## 2019-12-27 NOTE — PROGRESS NOTES
"Hand Therapy Progress Note    Current Date:  12/27/2019    Diagnosis: Right Elbow Flexor Lengthening, Flexor Pronator Slide Of Wrist and Finger, Thumb Adductor Lengthening    DOS: 10/02/19  Procedure:  Right Elbow Flexor Lengthening, Flexor Pronator Slide Of Wrist and Finger, Thumb Adductor Lengthening   Post:  3w 1d    Precautions: NA    Reporting period is 10/24/19 to 12/27/2019    Subjective:   Subjective changes noted by patient:  \"I've been wearing the braces, but they don't fit very well anymore. I can use my arm and fingers to hold things.\"  Functional changes noted by patient:  Improvement in Self Care Tasks (dressing, eating, bathing, hygiene/toileting)  Patient has noted adverse reaction to:  None    Objective:  Pain Level (Scale 0-10)   10/24/2019 12/27/19   At Rest 7/10 0/10   At worst 7/10 0     Pain Description  Date 10/24/2019   Location fingers   Pain Quality Sharp   Frequency constant     Pain is worst  daytime   Exacerbated by  movement, bumping it   Relieved by rest   Progression improving     ROM  Pain Report: - none  + mild    ++ moderate    +++ severe   Elbow 10/24/2019 12/27/19   AROM (PROM) R    Extension -46 -54  (-46)   Flexion 95 130   Supination + 39   Pronation 75 86     ROM  Pain Report: - none  + mild    ++ moderate    +++ severe   Wrist 10/24/2019 12/27/19   AROM (PROM) right    Extension (-19) (-42)   Flexion NT      ROM  Hand 10/24/2019   AROM(PROM) R   Index MP -6/   PIP -53/   DIP -40/   Long MP 0/   PIP -77/   DIP -43/   Ring MP 0/   PIP -40/   DIP -35/   Small MP 0/   PIP -20/   DIP -28/     Strength  contraindicated    Please refer to the daily flowsheet for treatment provided today.     Assessment:  Response to therapy has been improvement to:  ROM of Elbow:  All Planes  Wrist:  All Planes  Thumb:  All Planes  Fingers: All Planes    Overall Assessment:  Patient's symptoms are resolving.  Patient would benefit from continued therapy to achieve rehab potential  STG/LTG:  STGoals " have been reviewed and progress or achievement has occurred;  see goal sheet for details and updates.    Plan:  Frequency/Duration:  Recommend continuing with the current treatment plan. 2 X a month, once daily  for 2 months  Appropriateness of Rx I have re-evaluated this patient and find that the nature, scope, duration and intensity of the therapy is appropriate for the medical condition of the patient.  Recommendations for Continued Therapy  Additions to Treatment Plan -  Therapeutic Exercise:  Shoulder AROM    Home Exercise Program:  Wrist orthosis at neutral during day  Resting hand at night  Long arm-emphasizing extension at night  AROM of shoulder and elbow    Next Visit:  Orthosis modifications  AROM  Scar mgmt

## 2020-01-04 ENCOUNTER — TRANSFERRED RECORDS (OUTPATIENT)
Dept: HEALTH INFORMATION MANAGEMENT | Facility: CLINIC | Age: 20
End: 2020-01-04

## 2020-01-27 ENCOUNTER — TRANSFERRED RECORDS (OUTPATIENT)
Dept: HEALTH INFORMATION MANAGEMENT | Facility: CLINIC | Age: 20
End: 2020-01-27

## 2020-01-28 ENCOUNTER — TRANSFERRED RECORDS (OUTPATIENT)
Dept: HEALTH INFORMATION MANAGEMENT | Facility: CLINIC | Age: 20
End: 2020-01-28

## 2020-01-28 PROBLEM — M25.621 STIFFNESS OF ELBOW JOINT, RIGHT: Status: RESOLVED | Noted: 2019-10-27 | Resolved: 2020-01-28

## 2020-01-28 PROBLEM — Z47.89 AFTERCARE FOLLOWING SURGERY OF THE MUSCULOSKELETAL SYSTEM: Status: RESOLVED | Noted: 2019-10-27 | Resolved: 2020-01-28

## 2020-01-31 ENCOUNTER — PRE VISIT (OUTPATIENT)
Dept: ONCOLOGY | Facility: CLINIC | Age: 20
End: 2020-01-31

## 2020-02-06 ENCOUNTER — TRANSFERRED RECORDS (OUTPATIENT)
Dept: HEALTH INFORMATION MANAGEMENT | Facility: CLINIC | Age: 20
End: 2020-02-06

## 2020-02-12 ENCOUNTER — TRANSFERRED RECORDS (OUTPATIENT)
Dept: HEALTH INFORMATION MANAGEMENT | Facility: CLINIC | Age: 20
End: 2020-02-12

## 2020-02-12 NOTE — TELEPHONE ENCOUNTER
ONCOLOGY INTAKE: Records Information      APPT INFORMATION:  Referring provider:  Dr. Jamaal Collins MD  Referring provider s clinic:  [facility]  Reason for visit/diagnosis:  Sickle Cell anemia, per Sarah at [facility],   Has patient been notified of appointment date and time?: No    RECORDS INFORMATION:  Were the records received with the referral (via [employer])? No    Has patient been seen for any external appt for this diagnosis? Yes    If yes, where? [facility]    Has patient had any imaging or procedures outside of [place] for this condition? Yes      [facility] Yes[facility]    ADDITIONAL INFORMATION:  Patient is not on the wait list.

## 2020-02-13 NOTE — TELEPHONE ENCOUNTER
Action    Action Taken 2/13/20: Spoke w/ [facility] - they will fax over all updated notes (our most recent notes are from 9/2019) shortly. Confirmed consent/permission to release.   9:29 AM    -2/17/20: Recs from [facility] rec'd, faxed to [facility] for upload.   7:12 AM         RECORDS STATUS - ALL OTHER DIAGNOSIS      RECORDS RECEIVED FROM: [facility]   DATE RECEIVED:   NOTES STATUS DETAILS   OFFICE NOTE from referring provider [facility] notes in Epic Updated recs from [facility]'s faxed to [facility] 2/17//Dr. Jamaal Collins, also now viewable in [facility]   OFFICE NOTE from medical oncologist     DISCHARGE SUMMARY from hospital [facility] 10/1/19   DISCHARGE REPORT from the ER [facility] 12/28/19   OPERATIVE REPORT     MEDICATION LIST     CLINICAL TRIAL TREATMENTS TO DATE     LABS     PATHOLOGY REPORTS     ANYTHING RELATED TO DIAGNOSIS Epic 2/4/20   GENONOMIC TESTING     TYPE:     IMAGING (NEED IMAGES & REPORT)     CT SCANS     MRI     MAMMO     ULTRASOUND     PET

## 2020-02-27 ENCOUNTER — TELEPHONE (OUTPATIENT)
Dept: ONCOLOGY | Facility: CLINIC | Age: 20
End: 2020-02-27

## 2020-02-28 ENCOUNTER — PRE VISIT (OUTPATIENT)
Dept: ONCOLOGY | Facility: CLINIC | Age: 20
End: 2020-02-28

## 2020-02-28 ENCOUNTER — ONCOLOGY VISIT (OUTPATIENT)
Dept: ONCOLOGY | Facility: CLINIC | Age: 20
End: 2020-02-28
Attending: PEDIATRICS
Payer: COMMERCIAL

## 2020-02-28 VITALS
OXYGEN SATURATION: 98 % | BODY MASS INDEX: 26.43 KG/M2 | RESPIRATION RATE: 16 BRPM | HEIGHT: 64 IN | DIASTOLIC BLOOD PRESSURE: 80 MMHG | HEART RATE: 88 BPM | SYSTOLIC BLOOD PRESSURE: 132 MMHG | TEMPERATURE: 98.7 F | WEIGHT: 154.8 LBS

## 2020-02-28 DIAGNOSIS — F81.9 COGNITIVE DEVELOPMENTAL DELAY: ICD-10-CM

## 2020-02-28 DIAGNOSIS — R46.89 OPPOSITIONAL DEFIANT BEHAVIOR: ICD-10-CM

## 2020-02-28 DIAGNOSIS — E83.111 IRON OVERLOAD DUE TO REPEATED RED BLOOD CELL TRANSFUSIONS: ICD-10-CM

## 2020-02-28 DIAGNOSIS — E83.111 HEMOCHROMATOSIS DUE TO REPEATED RED BLOOD CELL TRANSFUSIONS: ICD-10-CM

## 2020-02-28 DIAGNOSIS — D57.00 SICKLE CELL PAIN CRISIS (H): ICD-10-CM

## 2020-02-28 DIAGNOSIS — I69.351 HEMIPLEGIA AND HEMIPARESIS FOLLOWING CEREBRAL INFARCTION AFFECTING RIGHT DOMINANT SIDE (H): ICD-10-CM

## 2020-02-28 PROCEDURE — 99205 OFFICE O/P NEW HI 60 MIN: CPT | Mod: ZP | Performed by: PEDIATRICS

## 2020-02-28 PROCEDURE — G0463 HOSPITAL OUTPT CLINIC VISIT: HCPCS | Mod: ZF

## 2020-02-28 RX ORDER — OXYCODONE HYDROCHLORIDE 10 MG/1
TABLET, FILM COATED, EXTENDED RELEASE ORAL
COMMUNITY
Start: 2020-02-25 | End: 2020-03-27

## 2020-02-28 ASSESSMENT — PAIN SCALES - GENERAL: PAINLEVEL: EXTREME PAIN (9)

## 2020-02-28 ASSESSMENT — MIFFLIN-ST. JEOR: SCORE: 1457.17

## 2020-02-28 NOTE — LETTER
"     RE: Jennifer Cervantes  [address]  [facility] [address]     Dear Colleague,    Thank you for referring your patient, Jennifer Cervantes, to the [facility]. Please see a copy of my visit note below.    Sickle Cell Clinic New Outpatient Visit    Date of visit: 02/28/2020    Jennifer Cervantes is a 19 year old female who is here for a new outpatient hematology visit for initiating care for Hb SS. Jennifer was referred by Jamaal Collins.  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 She also has significant anxiety and depression with a strong anxiety about transferring to the adult clinic. She usually has pain crises secondary to the anxiety. This \"fear of the unknown\" is significant enough that she wants to tour the inpatient floor before the transition or before she gets admitted there. [facility] recurrent [facility] crises and is actually out on pass now but is still admitted as of Friday 2/28. She has no real support from family at home and that, combined with her cognitive delays, make her care even more complex. She is having some back pain today which is a frequent location for her. She does say that her oral options do take an edge off. No fevers or cough, chest pain, dyspnea, bruising, or GI symptoms today. She has gotten a couple doses of Desferal for iron overload as she has been on chronic transfusions post-stroke for a long time. She is up to date on immunizations and got to meet with Daya Carter last week (also while on pass). It is unclear [facility] but based on " recent history, she thinks she will have a pain crisis soon after discharge.    Key results prior to referral:  Will review [facility] paperwork after discharge    Review of systems:  A complete 14 point review of systems was completed. All were negative except for what was reported in the HPI or highlighted here.    --------------------------------  Plan last reviewed with patient: 2/28/2020    Patient background: 19yo F, enjoys movies and kids though there are times where she does not really want to talk to people. Does not have a lot of social support at home.     Sickle Cell Disease History  Primary Hematologist: Perla  Genotype: SS  Acute Pain Crisis Treatment:    ER/Acute Care/Infusion Clinic:   o Morphine 1 mg IVP/SC Q1H X 3 doses  o Toradol  o Maintenance IV fluids with D5 half-normal saline  o Other: Benadryl PO and Zofran 8 mg IV PRN itching or nausea    Inpatient:  o Opioid: Morphine 1 mg IV Q1H PRN until PCA starts  o PCA plan: (Consistent with recent [facility] pain plan)  - PCA button dose: Morphine 0.7 mg  - Lockout: 10 minutes  - Continuous Infusion: consider 1 mg/hr  - One hr max: 4.2 mg  o [facility] often had success weaning to OxyCONTIN 10 mg q12h with Morphine 0.7 mg IV q10 min PRN rather than continuous infusion  o Other Medications: Benadryl, Zofran  o If PCA not working, she Has had success with ketamine starting at 4mg/h and advancing only to 6mg/h, as 8mg/h made her feel quite poorly.  o If giving scheduled toradol, hold ASA (ok to give celebrex if she prefers)  o Supportive Care: Docusate, Senna  Chronic Pain Medications:    Home regimen as of 2/4/20 at [facility]: OxyCONTIN 10 mg po q 12 hrs and oxycodone 5-10 mg p.o. q.4-6 hours p.r.n. breakthrough   pain.  She also continues on Celebrex, and Zoloft among other medications.    -Also benefits from mental health visits, acupuncture  Baseline Hemoglobin: 9.5 g/dL (on chronic transfusions)  Hydroxyurea use: Yes  H/O blood transfusions:  Yes, several (iron overload)    H/O Transfusion Reactions: no    Antibodies:none  H/O Acute Chest Syndrome: Yes    Last episode: 10/2019     ICU/intubation: unsure  H/O Stroke: Yes (managed with chronic transfusions (has left her with neurodevelopmental deficits, low IQ)  H/O VTE: no  H/O Cholecystectomy or Splenectomy: no  H/O Asthma, Pulm HTN, AVN, Leg Ulcers, Nephropathy, Retinopathy, etc: Iron overload, asthma, chronic lung disease    EMERGENCY CARE PLAN  DO NOT TRANSFUSE WITHOUT DISCUSSING WITH HEMATOLOGY ATTENDING (available 24/7).       TRANSFUSION IS RISKY DUE TO RISK OF ALLOIMMUNIZATION AGAINST RBC ANTIGENS AND IRON OVERLOAD.  INDICATIONS FOR TRANSFUSION ARE ACUTE STROKE AND ACUTE CHEST SYNDROME (hypoxia plus infiltrate, not chest pain).  DO NOT TRANSFUSE FOR ANEMIA      -------------------------------------------    Sickle Cell Disease Comprehensive Checklist    Bone Health/Avascular Necrosis Screening/Symptoms (each visit): none today    Leg Ulcer evaluation (every visit): none    Hypertension (every visit): none    Last ophthalmologic exam: within last year (timing unsure)    Last urinalysis for microalbuminuria/CKD (annually): within last year    Last pulmonary evaluation (asthma, AMAN, pulm HTN): within last year    Stroke/silent cerebral infarct Hx (Y/N): Yes    Last PCP Visit: 2/2020    Vaccines:  o PCV13: 5/13/19  o Pneumovax (PPSV23): 3/04, 10/09, 7/12/19 (next due 7/2024)  o Menactra: 4/2010, 9/2015 (MCV due Sept 2020)  o Trumemba:  o Influenza: got 19-20 vaccine    Past Medical History:  Past Medical History:   Diagnosis Date     Anemia      Anxiety      Bleeding disorder (H)      Blood clotting disorder (H)      Cerebral infarction (H) 2015     Cognitive developmental delay     low IQ. 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 (H)     right hand contractures     Iron  overload due to repeated red blood cell transfusions      Migraines      Oppositional defiant behavior      Uncomplicated asthma        Past Surgical History:  Past Surgical History:   Procedure Laterality Date     AS INSERT TUNNELED CV 2 CATH W/O PORT/PUMP       C BREAST REDUCTION (INCLUDES LIPO) TIER 3 Bilateral 04/23/2019     REPAIR TENDON ELBOW Right 10/2/2019    Procedure: Right Elbow Flexor Lengthening, Flexor Pronator Slide Of Wrist and Finger, Thumb Adductor Lengthening;  Surgeon: Anai Franco MD;  Location: [facility] OR     TONSILLECTOMY Bilateral 10/2/2019    Procedure: Bilateral Tonsillectomy;  Surgeon: Farhana uGy MD;  Location: [facility] OR       Family History:   Family History   Problem Relation Age of Onset     Sickle Cell Trait Mother      Sickle Cell Trait Father        Social History:  Social History     Socioeconomic History     Marital status: Single     Spouse name: Not on file     Number of children: Not on file     Years of education: Not on file     Highest education level: Not on file   Occupational History     Not on file   Social Needs     Financial resource strain: Not on file     Food insecurity:     Worry: Not on file     Inability: Not on file     Transportation needs:     Medical: Not on file     Non-medical: Not on file   Tobacco Use     Smoking status: Never Smoker     Smokeless tobacco: Never Used   Substance and Sexual Activity     Alcohol use: Never     Alcohol/week: 0.0 standard drinks     Drug use: Never     Sexual activity: Not Currently     Partners: Male     Birth control/protection: Other   Lifestyle     Physical activity:     Days per week: Not on file     Minutes per session: Not on file     Stress: Not on file   Relationships     Social connections:     Talks on phone: Not on file     Gets together: Not on file     Attends Christian service: Not on file     Active member of club or organization: Not on file     Attends meetings of clubs or organizations:  "Not on file     Relationship status: Not on file     Intimate partner violence:     Fear of current or ex partner: Not on file     Emotionally abused: Not on file     Physically abused: Not on file     Forced sexual activity: Not on file   Other Topics Concern     Not on file   Social History Narrative    Lives with mom and extended family but \"toxic environment\" per her report. She would like to move out but cannot financially do so. She has minimal support at home despite her significant SCD comorbidities and cognitive delay from stroke.       Medications:  Current Outpatient Medications   Medication     acetaminophen (TYLENOL) 325 MG tablet     aspirin (ASA) 81 MG tablet     Budesonide-Formoterol Fumarate (SYMBICORT IN)     deferasirox (JADENU) 180 MG tablet     erythromycin ethylsuccinate (E.E.S.) 400 MG tablet     gabapentin (NEURONTIN) 300 MG capsule     GNP SENNA-LAX 8.6 MG tablet     hydroxyurea (HYDREA) 500 MG capsule CHEMO     hydrOXYzine (ATARAX) 25 MG tablet     JADENU 360 MG tablet     levonorgestrel (LILETTA, 52 MG,) 19.5 MCG/DAY IUD     omeprazole (PRILOSEC) 40 MG DR capsule     oxyCODONE IR (ROXICODONE) 10 MG tablet     OXYCONTIN 10 MG 12 hr tablet     phenol (CHLORASEPTIC) 1.4 % spray     PROAIR  (90 Base) MCG/ACT inhaler     sertraline (ZOLOFT) 50 MG tablet     cefdinir (OMNICEF) 125 MG/5ML suspension     EPINEPHrine (EPIPEN) 0.3 MG/0.3ML injection     No current facility-administered medications for this visit.        Physical Exam:   /80   Pulse 88   Temp 98.7  F (37.1  C) (Oral)   Resp 16   Ht 1.626 m (5' 4\")   Wt 70.2 kg (154 lb 12.8 oz)   SpO2 98%   BMI 26.57 kg/m       GENERAL APPEARANCE: Jennifer is a young adult, AA female, who seems relatively comfortable but a bit tense in the new situation  EYES: Eyes grossly normal to inspection, PERRL and conjunctivae normal, mild icterus, extraocular movements intact  HENT: nose and mouth without ulcers or lesions, oropharynx clear " and oral mucous membranes moist  NECK: no adenopathy  RESP: lungs clear to auscultation - no rales, rhonchi or wheezes  CHEST: port in place, accessed  CV: regular rate and rhythm, normal S1 S2, no S3 or S4, no murmur, click or rub,  ABDOMEN: soft, nontender, no hepatosplenomegaly, no masses and bowel sounds normal   (female): deferred  MS: right hand with spastic hemiparesis. She is self-conscious about this. Otherwise her gait is without significant ataxia  SKIN: no suspicious lesions or rashes  NEURO: Normal strength and tone aside from right hand, sensory exam grossly normal, mentation intact and speech normal  PSYCH: seems anxious but willing to answer most questions. Did laugh a few times.    Labs:   None today    Imaging: none today    Assessment:  Jennifer Cervantes is a 19 year old female patient who was referred to hematology for concerns of Hb SS in need of transfusions. Her disease is complicated by stroke leading to significant cognitive delay and right UE hemiparesis, high anxiety leading to frequent pain crises on top of chronic pain syndrome, poor social structure at home with no real support, and iron overload secondary to repeated transfusions. She is in clinic today to initiate care as she turns 20 in a few days and most likely after she discharges from [facility], her next admission will be at [facility]    Plan:  1) HbSS and complications   -- [facility] plan pulled from [facility] as she could not recall, though she said Morphine is the best for her in an acute setting. Also has    several non-opioid chronic medications   --Needs monthly transfusions   --Needs aggressive chelation. Need to determine last Ferriscan   --Consider neuropsych testing given her delay history   --Continue ASA post-stroke  2) Labs: none today (drawn at [facility] earlier today)  3) Medication Changes: None  4)  Other orders/recommendations: Discussed setting up a tour of the inpatient floor next week if she gets discharged.  5)   HCM: PCP is Daya Carter. Need to get SW closely involved  6)  Follow up plan: weekly with either PA (met Andrei today) or myself for the foreseeable future. May benefit from crizanlizumab     It was a pleasure to meet Jennifer today and to participate in her care moving forward    I spent a total of 60 minutes face-to-face with Jennifer during today's office visit. Over 50% of this time was spent counseling the patient and/or coordinating care regarding the steps to transition to adulthood for SCD and review the major comorbidities that she currently has. See note for details.    Eric Duncan MD  Hematologist  Division of Hematology, Oncology, and Transplantation  [facility]  [facility]  Pager: [phone]

## 2020-02-28 NOTE — PATIENT INSTRUCTIONS
After discharge, I would like to have you seen by our PA Andrei Olson, next week or you would see me the week after. I want to have you seen weekly for now for the next few months and we may spread it out with time.    Once you get out of the hospital, let's plan to do a tour of our floor next week. I can coordinate with

## 2020-02-28 NOTE — NURSING NOTE
"Oncology Rooming Note    February 28, 2020 2:46 PM   Jennifer Cervantes is a 19 year old female who presents for:    No chief complaint on file.    Initial Vitals: /80   Pulse 88   Temp 98.7  F (37.1  C) (Oral)   Resp 16   Ht 1.626 m (5' 4\")   Wt 70.2 kg (154 lb 12.8 oz)   SpO2 98%   BMI 26.57 kg/m   Estimated body mass index is 26.57 kg/m  as calculated from the following:    Height as of this encounter: 1.626 m (5' 4\").    Weight as of this encounter: 70.2 kg (154 lb 12.8 oz). Body surface area is 1.78 meters squared.  Extreme Pain (9) Comment: Data Unavailable   No LMP recorded. (Menstrual status: Birth Control).  Allergies reviewed: Yes  Medications reviewed: Yes    Medications: Medication refills not needed today.  Pharmacy name entered into [employer]:    [facility] - [place], [place] - [address]  [id number] - [place], [place] - [address] [facility] [address] AT [facility] [address]    Clinical concerns:  New Patient , No questions and concerns.    Dione Suresh CMA                "

## 2020-02-28 NOTE — PROGRESS NOTES
"[facility] New Outpatient Visit    Date of visit: 02/28/2020    Jennifer Cervantes is a 19 year old female who is here for a new outpatient hematology visit for initiating care for Hb SS. Jennifer was referred by Jamaal Collins.  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 She also has significant anxiety and depression with a strong anxiety about transferring to the adult clinic. She usually has pain crises secondary to the anxiety. This \"fear of the unknown\" is significant enough that she wants to tour the inpatient floor before the transition or before she gets admitted there. [facility] crises and is actually out on pass now but is still admitted as of Friday 2/28. She has no real support from family at home and that, combined with her cognitive delays, make her care even more complex. She is having some back pain today which is a frequent location for her. She does say that her oral options do take an edge off. No fevers or cough, chest pain, dyspnea, bruising, or GI symptoms today. She has gotten a couple doses of Desferal for iron overload as she has been on chronic transfusions post-stroke for a long time. She is up to date on immunizations and got to meet with Daya Carter last week (also while on pass). It is unclear when she will discharge from [facility] but based on recent history, she thinks she will have a pain crisis soon after discharge.    Key results prior to referral:  Will review [facility] paperwork after [facility]    Review of systems:  A complete 14 point review of systems was " completed. All were negative except for what was reported in the HPI or highlighted here.    --------------------------------  Plan last reviewed with patient: 2/28/2020    Patient background: 21yo F, enjoys movies and kids though there are times where she does not really want to talk to people. Does not have a lot of social support at home.     Sickle Cell Disease History  Primary Hematologist: Perla  Genotype: SS  Acute Pain Crisis Treatment:    ER/Acute Care/Infusion Clinic:   o Morphine 1 mg IVP/SC Q1H X 3 doses  o Toradol  o Maintenance IV fluids with D5 half-normal saline  o Other: Benadryl PO and Zofran 8 mg IV PRN itching or nausea    Inpatient:  o Opioid: Morphine 1 mg IV Q1H PRN until PCA starts  o PCA plan: (Consistent with recent [facility] pain plan)  - PCA button dose: Morphine 0.7 mg  - Lockout: 10 minutes  - Continuous Infusion: consider 1 mg/hr  - One hr max: 4.2 mg  o [facility] often had success weaning to OxyCONTIN 10 mg q12h with Morphine 0.7 mg IV q10 min PRN rather than continuous infusion  o Other Medications: Benadryl, Zofran  o If PCA not working, she Has had success with ketamine starting at 4mg/h and advancing only to 6mg/h, as 8mg/h made her feel quite poorly.  o If giving scheduled toradol, hold ASA (ok to give celebrex if she prefers)  o Supportive Care: Docusate Senna  Chronic Pain Medications:    [facility] regimen as [facility]4/20 at [facility]: OxyCONTIN 10 mg po q 12 hrs and oxycodone 5-10 mg p.o. q.4-6 hours p.r.n. breakthrough   pain.  She also continues on Celebrex, and Zoloft among other medications.    -Also benefits from mental health visits, acupuncture  Baseline Hemoglobin: 9.5 g/dL (on chronic transfusions)  Hydroxyurea use: Yes  H/O blood transfusions: Yes, several (iron overload)    H/O Transfusion Reactions: no    Antibodies:none  H/O Acute Chest Syndrome: Yes    Last episode: 10/2019     ICU/intubation: unsure  H/O Stroke: Yes (managed with chronic transfusions (has left her  with neurodevelopmental deficits, low IQ)  H/O VTE: no  H/O Cholecystectomy or Splenectomy: no  H/O Asthma, Pulm HTN, AVN, Leg Ulcers, Nephropathy, Retinopathy, etc: Iron overload, asthma, chronic lung disease    EMERGENCY CARE PLAN  DO NOT TRANSFUSE WITHOUT DISCUSSING WITH HEMATOLOGY ATTENDING (available 24/7).       TRANSFUSION IS RISKY DUE TO RISK OF ALLOIMMUNIZATION AGAINST RBC ANTIGENS AND IRON OVERLOAD.  INDICATIONS FOR TRANSFUSION ARE ACUTE STROKE AND ACUTE CHEST SYNDROME (hypoxia plus infiltrate, not chest pain).  DO NOT TRANSFUSE FOR ANEMIA      -------------------------------------------    Sickle Cell Disease Comprehensive Checklist    Bone Health/Avascular Necrosis Screening/Symptoms (each visit): none today    Leg Ulcer evaluation (every visit): none    Hypertension (every visit): none    Last ophthalmologic exam: within last year (timing unsure)    Last urinalysis for microalbuminuria/CKD (annually): within last year    Last pulmonary evaluation (asthma, AMAN, pulm HTN): within last year    Stroke/silent cerebral infarct Hx (Y/N): Yes    Last PCP Visit: 2/2020    Vaccines:  o PCV13: 5/13/19  o Pneumovax (PPSV23): 3/04, 10/09, 7/12/19 (next due 7/2024)  o Menactra: 4/2010, 9/2015 (MCV due Sept 2020)  o Trumemba:  o Influenza: got 19-20 vaccine    Past Medical History:  Past Medical History:   Diagnosis Date     Anemia      Anxiety      Bleeding disorder (H)      Blood clotting disorder (H)      Cerebral infarction (H) 2015     Cognitive developmental delay     low IQ. 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 (H)     right hand contractures     Iron overload due to repeated red blood cell transfusions      Migraines      Oppositional defiant behavior      Uncomplicated asthma        Past Surgical History:  Past Surgical History:   Procedure Laterality Date     AS INSERT  TUNNELED CV 2 CATH W/O PORT/PUMP       C BREAST REDUCTION (INCLUDES LIPO) TIER 3 Bilateral 04/23/2019     REPAIR TENDON ELBOW Right 10/2/2019    Procedure: Right Elbow Flexor Lengthening, Flexor Pronator Slide Of Wrist and Finger, Thumb Adductor Lengthening;  Surgeon: Anai Franco MD;  Location: [facility] OR     TONSILLECTOMY Bilateral 10/2/2019    Procedure: Bilateral Tonsillectomy;  Surgeon: Farhana Guy MD;  Location: [facility] OR       Family History:   Family History   Problem Relation Age of Onset     Sickle Cell Trait Mother      Sickle Cell Trait Father        Social History:  Social History     Socioeconomic History     Marital status: Single     Spouse name: Not on file     Number of children: Not on file     Years of education: Not on file     Highest education level: Not on file   Occupational History     Not on file   Social Needs     Financial resource strain: Not on file     Food insecurity:     Worry: Not on file     Inability: Not on file     Transportation needs:     Medical: Not on file     Non-medical: Not on file   Tobacco Use     Smoking status: Never Smoker     Smokeless tobacco: Never Used   Substance and Sexual Activity     Alcohol use: Never     Alcohol/week: 0.0 standard drinks     Drug use: Never     Sexual activity: Not Currently     Partners: Male     Birth control/protection: Other   Lifestyle     Physical activity:     Days per week: Not on file     Minutes per session: Not on file     Stress: Not on file   Relationships     Social connections:     Talks on phone: Not on file     Gets together: Not on file     Attends Caodaism service: Not on file     Active member of club or organization: Not on file     Attends meetings of clubs or organizations: Not on file     Relationship status: Not on file     Intimate partner violence:     Fear of current or ex partner: Not on file     Emotionally abused: Not on file     Physically abused: Not on file     Forced sexual activity:  "Not on file   Other Topics Concern     Not on file   Social History Narrative    Lives with mom and extended family but \"toxic environment\" per her report. She would like to move out but cannot financially do so. She has minimal support at home despite her significant SCD comorbidities and cognitive delay from stroke.       Medications:  Current Outpatient Medications   Medication     acetaminophen (TYLENOL) 325 MG tablet     aspirin (ASA) 81 MG tablet     Budesonide-Formoterol Fumarate (SYMBICORT IN)     deferasirox (JADENU) 180 MG tablet     erythromycin ethylsuccinate (E.E.S.) 400 MG tablet     gabapentin (NEURONTIN) 300 MG capsule     GNP SENNA-LAX 8.6 MG tablet     hydroxyurea (HYDREA) 500 MG capsule CHEMO     hydrOXYzine (ATARAX) 25 MG tablet     JADENU 360 MG tablet     levonorgestrel (LILETTA, 52 MG,) 19.5 MCG/DAY IUD     omeprazole (PRILOSEC) 40 MG DR capsule     oxyCODONE IR (ROXICODONE) 10 MG tablet     OXYCONTIN 10 MG 12 hr tablet     phenol (CHLORASEPTIC) 1.4 % spray     PROAIR  (90 Base) MCG/ACT inhaler     sertraline (ZOLOFT) 50 MG tablet     cefdinir (OMNICEF) 125 MG/5ML suspension     EPINEPHrine (EPIPEN) 0.3 MG/0.3ML injection     No current facility-administered medications for this visit.          Physical Exam:   /80   Pulse 88   Temp 98.7  F (37.1  C) (Oral)   Resp 16   Ht 1.626 m (5' 4\")   Wt 70.2 kg (154 lb 12.8 oz)   SpO2 98%   BMI 26.57 kg/m      GENERAL APPEARANCE: Jennifer is a young adult, AA female, who seems relatively comfortable but a bit tense in the new situation  EYES: Eyes grossly normal to inspection, PERRL and conjunctivae normal, mild icterus, extraocular movements intact  HENT: nose and mouth without ulcers or lesions, oropharynx clear and oral mucous membranes moist  NECK: no adenopathy  RESP: lungs clear to auscultation - no rales, rhonchi or wheezes  CHEST: port in place, accessed  CV: regular rate and rhythm, normal S1 S2, no S3 or S4, no murmur, click " or rub,  ABDOMEN: soft, nontender, no hepatosplenomegaly, no masses and bowel sounds normal   (female): deferred  MS: right hand with spastic hemiparesis. She is self-conscious about this. Otherwise her gait is without significant ataxia  SKIN: no suspicious lesions or rashes  NEURO: Normal strength and tone aside from right hand, sensory exam grossly normal, mentation intact and speech normal  PSYCH: seems anxious but willing to answer most questions. Did laugh a few times.      Labs:   None today      Imaging: none today    Assessment:  Jennifer Cervantes is a 19 year old female patient who was referred to hematology for concerns of Hb SS in need of transfusions. Her disease is complicated by stroke leading to significant cognitive delay and right UE hemiparesis, high anxiety leading to frequent pain crises on top of chronic pain syndrome, poor social structure at home with no real support, and iron overload secondary to repeated transfusions. She is in clinic today to initiate care as she turns 20 in a few days and most likely after she discharges from [facility], her next admission will be at [facility]    Plan:  1) HbSS and complications   -- Pain plan pulled from [facility] chart as she could not recall, though she said Morphine is the best for her in an acute setting. Also has    several non-opioid chronic medications   --Needs monthly transfusions   --Needs aggressive chelation. Need to determine last Ferriscan   --Consider neuropsych testing given her delay history   --Continue ASA post-stroke  2) Labs: none today (drawn at [facility] earlier today)  3) Medication Changes: None  4)  Other orders/recommendations: Discussed setting up a tour of the inpatient floor next week if she gets discharged.  5)  HCM: PCP is Daya Carter. Need to get SW closely involved  6)  Follow up plan: weekly with either PA (met Forbes today) or myself for the foreseeable future. May benefit from crizanlizumab    It was a pleasure to  meet Jennifer today and to participate in her care moving forward    I spent a total of 60 minutes face-to-face with Jennifer during today's office visit. Over 50% of this time was spent counseling the patient and/or coordinating care regarding the steps to transition to adulthood for SCD and review the major comorbidities that she currently has. See note for details.        Eric Duncan MD  Hematologist  Division of Hematology, Oncology, and Transplantation  [facility]  [facility]  Pager: [phone]

## 2020-03-03 RX ORDER — ONDANSETRON 8 MG/1
8 TABLET, FILM COATED ORAL
Status: CANCELLED
Start: 2020-03-03

## 2020-03-03 RX ORDER — DIPHENHYDRAMINE HCL 25 MG
25 CAPSULE ORAL
Status: CANCELLED
Start: 2020-03-03

## 2020-03-03 RX ORDER — HEPARIN SODIUM (PORCINE) LOCK FLUSH IV SOLN 100 UNIT/ML 100 UNIT/ML
5 SOLUTION INTRAVENOUS
Status: CANCELLED | OUTPATIENT
Start: 2020-03-03

## 2020-03-03 RX ORDER — HEPARIN SODIUM,PORCINE 10 UNIT/ML
5 VIAL (ML) INTRAVENOUS
Status: CANCELLED | OUTPATIENT
Start: 2020-03-03

## 2020-03-04 ENCOUNTER — TELEPHONE (OUTPATIENT)
Dept: ONCOLOGY | Facility: CLINIC | Age: 20
End: 2020-03-04

## 2020-03-04 NOTE — TELEPHONE ENCOUNTER
Writer attempted to place call to patient to follow up on status. Phone number is no longer working. Writer left voicemail with Sarah Geiger, Sickle Cell , with [facility].

## 2020-03-13 ENCOUNTER — TELEPHONE (OUTPATIENT)
Dept: ONCOLOGY | Facility: CLINIC | Age: 20
End: 2020-03-13

## 2020-03-13 ENCOUNTER — ONCOLOGY VISIT (OUTPATIENT)
Dept: ONCOLOGY | Facility: CLINIC | Age: 20
End: 2020-03-13
Attending: PEDIATRICS
Payer: COMMERCIAL

## 2020-03-13 VITALS
RESPIRATION RATE: 14 BRPM | BODY MASS INDEX: 26.09 KG/M2 | OXYGEN SATURATION: 99 % | SYSTOLIC BLOOD PRESSURE: 132 MMHG | HEART RATE: 90 BPM | WEIGHT: 152 LBS | DIASTOLIC BLOOD PRESSURE: 79 MMHG | TEMPERATURE: 98.5 F

## 2020-03-13 DIAGNOSIS — D57.1 HB-SS DISEASE WITHOUT CRISIS (H): Primary | ICD-10-CM

## 2020-03-13 DIAGNOSIS — Z86.73 HISTORY OF STROKE: ICD-10-CM

## 2020-03-13 DIAGNOSIS — E83.111 IRON OVERLOAD DUE TO REPEATED RED BLOOD CELL TRANSFUSIONS: ICD-10-CM

## 2020-03-13 DIAGNOSIS — D57.1 HB-SS DISEASE WITHOUT CRISIS (H): ICD-10-CM

## 2020-03-13 DIAGNOSIS — D57.01 HB-SS DISEASE WITH ACUTE CHEST SYNDROME (H): ICD-10-CM

## 2020-03-13 DIAGNOSIS — G89.18 ACUTE POST-OPERATIVE PAIN: ICD-10-CM

## 2020-03-13 PROBLEM — F32.A DEPRESSIVE DISORDER: Status: ACTIVE | Noted: 2020-02-18

## 2020-03-13 PROBLEM — R09.02 HYPOXEMIA: Status: ACTIVE | Noted: 2020-02-18

## 2020-03-13 PROBLEM — K29.70 GASTRITIS: Status: ACTIVE | Noted: 2020-02-18

## 2020-03-13 PROBLEM — G43.909 MIGRAINE HEADACHE: Status: ACTIVE | Noted: 2020-02-18

## 2020-03-13 PROBLEM — F41.0 PANIC DISORDER: Status: ACTIVE | Noted: 2020-02-18

## 2020-03-13 PROBLEM — Z97.5 PRESENCE OF INTRAUTERINE CONTRACEPTIVE DEVICE: Status: ACTIVE | Noted: 2020-02-18

## 2020-03-13 PROBLEM — T78.40XA ALLERGIC REACTION: Status: ACTIVE | Noted: 2020-02-18

## 2020-03-13 PROBLEM — N70.11 HYDROSALPINX: Status: ACTIVE | Noted: 2020-02-18

## 2020-03-13 PROBLEM — M54.9 CHRONIC BACK PAIN: Status: ACTIVE | Noted: 2020-02-18

## 2020-03-13 PROBLEM — E66.3 OVERWEIGHT: Status: ACTIVE | Noted: 2020-03-13

## 2020-03-13 PROBLEM — G89.29 CHRONIC BACK PAIN: Status: ACTIVE | Noted: 2020-02-18

## 2020-03-13 PROBLEM — R52 ACUTE PAIN: Status: ACTIVE | Noted: 2020-03-13

## 2020-03-13 LAB
ALBUMIN SERPL-MCNC: 4.3 G/DL (ref 3.4–5)
ALP SERPL-CCNC: 78 U/L (ref 40–150)
ALT SERPL W P-5'-P-CCNC: 54 U/L (ref 0–50)
ANION GAP SERPL CALCULATED.3IONS-SCNC: 6 MMOL/L (ref 3–14)
AST SERPL W P-5'-P-CCNC: 49 U/L (ref 0–45)
BASOPHILS # BLD AUTO: 0.2 10E9/L (ref 0–0.2)
BASOPHILS NFR BLD AUTO: 1.2 %
BILIRUB SERPL-MCNC: 3.2 MG/DL (ref 0.2–1.3)
BUN SERPL-MCNC: 8 MG/DL (ref 7–30)
CALCIUM SERPL-MCNC: 8.8 MG/DL (ref 8.5–10.1)
CHLORIDE SERPL-SCNC: 110 MMOL/L (ref 94–109)
CO2 SERPL-SCNC: 22 MMOL/L (ref 20–32)
CREAT SERPL-MCNC: 0.5 MG/DL (ref 0.52–1.04)
DIFFERENTIAL METHOD BLD: ABNORMAL
EOSINOPHIL # BLD AUTO: 0.1 10E9/L (ref 0–0.7)
EOSINOPHIL NFR BLD AUTO: 0.8 %
ERYTHROCYTE [DISTWIDTH] IN BLOOD BY AUTOMATED COUNT: 19.7 % (ref 10–15)
FERRITIN SERPL-MCNC: ABNORMAL NG/ML (ref 12–150)
GFR SERPL CREATININE-BSD FRML MDRD: >90 ML/MIN/{1.73_M2}
GLUCOSE SERPL-MCNC: 87 MG/DL (ref 70–99)
HCT VFR BLD AUTO: 28.2 % (ref 35–47)
HGB BLD-MCNC: 9.4 G/DL (ref 11.7–15.7)
IMM GRANULOCYTES # BLD: 0.1 10E9/L (ref 0–0.4)
IMM GRANULOCYTES NFR BLD: 0.9 %
LYMPHOCYTES # BLD AUTO: 3.1 10E9/L (ref 0.8–5.3)
LYMPHOCYTES NFR BLD AUTO: 18.9 %
MCH RBC QN AUTO: 31.2 PG (ref 26.5–33)
MCHC RBC AUTO-ENTMCNC: 33.3 G/DL (ref 31.5–36.5)
MCV RBC AUTO: 94 FL (ref 78–100)
MISCELLANEOUS TEST: NORMAL
MONOCYTES # BLD AUTO: 1.2 10E9/L (ref 0–1.3)
MONOCYTES NFR BLD AUTO: 7.4 %
NEUTROPHILS # BLD AUTO: 11.5 10E9/L (ref 1.6–8.3)
NEUTROPHILS NFR BLD AUTO: 70.8 %
NRBC # BLD AUTO: 0.4 10*3/UL
NRBC BLD AUTO-RTO: 2 /100
PLATELET # BLD AUTO: 246 10E9/L (ref 150–450)
POTASSIUM SERPL-SCNC: 3.8 MMOL/L (ref 3.4–5.3)
PROT SERPL-MCNC: 8.1 G/DL (ref 6.8–8.8)
RBC # BLD AUTO: 3.01 10E12/L (ref 3.8–5.2)
SODIUM SERPL-SCNC: 138 MMOL/L (ref 133–144)
WBC # BLD AUTO: 16.2 10E9/L (ref 4–11)

## 2020-03-13 PROCEDURE — G0463 HOSPITAL OUTPT CLINIC VISIT: HCPCS | Mod: ZF

## 2020-03-13 PROCEDURE — 85025 COMPLETE CBC W/AUTO DIFF WBC: CPT | Performed by: PEDIATRICS

## 2020-03-13 PROCEDURE — 84999 UNLISTED CHEMISTRY PROCEDURE: CPT | Performed by: PEDIATRICS

## 2020-03-13 PROCEDURE — 36415 COLL VENOUS BLD VENIPUNCTURE: CPT | Performed by: PEDIATRICS

## 2020-03-13 PROCEDURE — 80053 COMPREHEN METABOLIC PANEL: CPT | Performed by: PEDIATRICS

## 2020-03-13 PROCEDURE — 83021 HEMOGLOBIN CHROMOTOGRAPHY: CPT | Performed by: PEDIATRICS

## 2020-03-13 PROCEDURE — 82728 ASSAY OF FERRITIN: CPT | Performed by: PEDIATRICS

## 2020-03-13 RX ORDER — SERTRALINE HYDROCHLORIDE 100 MG/1
TABLET, FILM COATED ORAL
COMMUNITY
Start: 2020-03-02 | End: 2020-03-17

## 2020-03-13 RX ORDER — OXYCODONE HYDROCHLORIDE 10 MG/1
TABLET ORAL
Qty: 56 TABLET | Refills: 0 | Status: SHIPPED | OUTPATIENT
Start: 2020-03-13 | End: 2020-03-27

## 2020-03-13 RX ORDER — METHADONE HYDROCHLORIDE 10 MG/1
10 TABLET ORAL
COMMUNITY
Start: 2019-12-26 | End: 2020-03-13

## 2020-03-13 RX ORDER — PREGABALIN 75 MG/1
CAPSULE ORAL
COMMUNITY
Start: 2019-11-27 | End: 2020-05-22

## 2020-03-13 RX ORDER — CELECOXIB 100 MG/1
100 CAPSULE ORAL
COMMUNITY
Start: 2020-01-10 | End: 2020-05-08

## 2020-03-13 RX ORDER — ONDANSETRON 8 MG/1
8 TABLET, FILM COATED ORAL EVERY 8 HOURS PRN
COMMUNITY
Start: 2020-02-03 | End: 2021-09-20

## 2020-03-13 RX ORDER — CYPROHEPTADINE HYDROCHLORIDE 4 MG/1
TABLET ORAL
Status: ON HOLD | COMMUNITY
Start: 2020-03-02 | End: 2020-04-11

## 2020-03-13 ASSESSMENT — PAIN SCALES - GENERAL: PAINLEVEL: NO PAIN (0)

## 2020-03-13 NOTE — LETTER
3/13/2020       RE: Jennifer Cervantes  [address]  [facility] [address]     Dear Colleague,    Thank you for referring your patient, Jennifer Cervantes, to the [facility]. Please see a copy of my visit note below.    Sickle Cell Clinic Follow Up Outpatient Visit    Date of visit: 03/13/2020    Jennifer Cervantes is a 19 year old female who is here for a follow up outpatient hematology visit for Hb SS, having recently initiated care. Jennifer was referred by Jamaal Collins.  Jennifer Cervantes is here today with alone    Interval History  No recent ill symptoms. She has been out of the hospital most of the last week and a half. She was admitted for her 20st birthday but did have some smiles in the room when she recalled Dr. More and the team getting her flowers and a cake. She maintains that this show of support was more than she would ever have at home. No cough or cold symptoms recently. She saw Dr. Collins earlier this week or last to discuss her cardiac MRI findings (reassuring) and said he gave her enough oxycodone to get to this appointment, taking 1 tab every 4-6 hours but she is out today. She does not need more oxycontin and feels comfortable with her current home regimen. She reports good recognition of which pill is which, though she takes a lot of them and does not know all of them off hand. She was on Celebrex a few months ago but was told to stop during one admission. It was not clear why she was to stop but she still has some at home. No other acute concerns.    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 She also has significant anxiety and depression with a strong anxiety  "about transferring to the adult clinic. She usually has pain crises secondary to the anxiety. This \"fear of the unknown\" is significant enough that she wants to tour the inpatient floor before the transition or before she gets admitted there. She has been admitted to [facility] most of the last few months with recurrent pain crises and is actually out on pass now but is still admitted as of Friday 2/28. She has no real support from family at home and that, combined with her cognitive delays, make her care even more complex. She is having some back pain today which is a frequent location for her. She does say that her oral options do take an edge off. No fevers or cough, chest pain, dyspnea, bruising, or GI symptoms today. She has gotten a couple doses of Desferal for iron overload as she has been on chronic transfusions post-stroke for a long time. She is up to date on immunizations and got to meet with Daya Carter last week (also while on pass). It is unclear when she will discharge from [facility] but based on recent history, she thinks she will have a pain crisis soon after discharge.    Key results prior to referral:  MR ABDOMEN W/O CONTRAST (Ferriscan), 10/3/2017     Comparison: 12/28/2015   Clinical history: Sickle cell disease   Findings:     Average liver iron concentration:  28.6 mg/g dry tissue, previously 22.8 mg/g dry tissue (NR: 0.17-1.8)  513 mmol/kg dry tissue, previously 400.8 mmol/kg dry tissue (NR: 3-33)     There is also iron deposition in the spleen, which is enlarged.  Prominent vessels in the left upper quadrant, which may be related to varices, are unchanged from the comparison examinations.                                                            Impression:  1. Increase in elevated liver concentration.  2. Splenomegaly. Question portal hypertension.     WAYNE CURTIS MD    11/4/19  MRI Pelvis  Summary:  Marrow changes consistent with SCD but no convincing evidence of AVN at this time. "     1/28/20 MRI/MRA  Comparison 5/8/15  Summary:  Stable appearance of the brain and cerebral vasculature compared to 5/8/2015, including remote left MCA teritory infarct. No evidence of new infarct or new abnormality on MRA    2/26/20  EGD pathology  Proximal biopsy, esophagus: Rare intraepithelial eosinophils  Distal biopsy, esophagus: Active esophagitis: Peak eosinophil count = 18/hpf  Stomach biopsy: Minimal changes suggestive of reactive gastropathy  Duodenal biopsy: mild nonspecific changes    3/10/2020 Cardiac MRI      HEIGHT: 64.00 in    (162.56 cm)    WEIGHT: 150.00 lbs    (68.04 kgs)    BSA: 1.73 m^2        SUMMARY    ==========================================================================================================        Structurally normal heart.    Normal right ventricular size and systolic function.    Normal left ventricular size and systolic function.    T2* liver:2-3 msec (consistent with iron deposition)    T2* myocardium: 32-35 msec (normal). .         LEFT VENTRICLE: The left ventricle is normal. Quantitative LVEF 59%.        RIGHT VENTRICLE: The right ventricle is normal. Quantitative RVEF 61%.        LV/RV SEPTUM: The LV/RV septum is normal.        LA/RA SEPTUM: The LA/RA septum is normal.        LEFT ATRIUM: The left atrium is normal.        RIGHT ATRIUM: The right atrium is normal.        PERICARDIUM: The pericardium is normal.        PLEURAL EFFUSION: The pleural effusion is normal.        AORTIC VALVE: The aortic valve is normal.        MITRAL VALVE: The mitral valve is normal.        TRICUSPID VALVE: The tricuspid valve is normal.        PULMONIC VALVE: The pulmonic valve is normal.        AORTIC ROOT: The aortic root is normal.            CORE EXAM    ==========================================================================================================        MEASUREMENTS    ----------------------------------------------------------------------------------------        VOLUMETRIC ANALYSIS        ----------------------------------------------    .------------------------------------------------------.                   LV    Reference  RV    Reference      +------+--------+------+-----------+------+------------+     EDV   ml       121   ()   117   (100-184)            ml/m^2  69.9   (65-99)   67.6   ()       ESV   ml        50   (29-66)     46   (29-82)              ml/m^2  28.9   (19-37)   26.6   (20-45)        CO                                                   MASS                                                 SV    ml        71   ()    71   ()             ml/m^2  41.0   (42-66)   41.0   (39-63)        EF    %         59   (56-75)     61   (49-73)       '------+--------+------+-----------+------+------------'          Review of systems:  A complete 14 point review of systems was completed. All were negative except for what was reported in the HPI or highlighted here.    --------------------------------  Plan last reviewed with patient: 3/13/2020    Patient background: 21yo F, enjoys movies and kids though there are times where she does not really want to talk to people. Does not have a lot of social support at home.     Sickle Cell Disease History  Primary Hematologist: Perla  Genotype: SS  Acute Pain Crisis Treatment:    ER/Acute Care/Infusion Clinic:   o Morphine 1 mg IVP/SC Q1H X 3 doses  o Toradol  o Maintenance IV fluids with D5 half-normal saline  o Other: Benadryl PO and Zofran 8 mg IV PRN itching or nausea    Inpatient:  o Opioid: Morphine 1 mg IV Q1H PRN until PCA starts  o PCA plan: (Consistent with recent [facility] pain plan)  - PCA button dose: Morphine 0.7 mg  - Lockout: 10 minutes  - Continuous Infusion: consider 1 mg/hr  - One hr max: 4.2 mg  o [facility] often had success weaning to OxyCONTIN 10 mg q12h with Morphine 0.7 mg IV q10 min PRN rather than continuous  infusion  o Other Medications: Benadryl, Zofran  o If PCA not working, she Has had success with ketamine starting at 4mg/h and advancing only to 6mg/h, as 8mg/h made her feel quite poorly.  o If giving scheduled toradol, hold ASA (ok to give celebrex if she prefers)  o Supportive Care: Docusate, Senna  Chronic Pain Medications:    Home regimen as of 2/4/20 at [facility]: OxyCONTIN 10 mg po q 12 hrs and oxycodone 5-10 mg p.o. q.4-6 hours p.r.n. breakthrough   pain.  She also continues on Celebrex, and Zoloft among other medications.    -Also benefits from mental health visits, acupuncture  Baseline Hemoglobin: 9.5 g/dL (on chronic transfusions)  Hydroxyurea use: Yes  H/O blood transfusions: Yes, several (iron overload)    H/O Transfusion Reactions: no    Antibodies:none  H/O Acute Chest Syndrome: Yes    Last episode: 10/2019     ICU/intubation: unsure  H/O Stroke: Yes (managed with chronic transfusions (has left her with neurodevelopmental deficits, low IQ)  H/O VTE: no  H/O Cholecystectomy or Splenectomy: no  H/O Asthma, Pulm HTN, AVN, Leg Ulcers, Nephropathy, Retinopathy, etc: Iron overload, asthma, chronic lung disease    EMERGENCY CARE PLAN  DO NOT TRANSFUSE WITHOUT DISCUSSING WITH HEMATOLOGY ATTENDING (available 24/7).       TRANSFUSION IS RISKY DUE TO RISK OF ALLOIMMUNIZATION AGAINST RBC ANTIGENS AND IRON OVERLOAD.  INDICATIONS FOR TRANSFUSION ARE ACUTE STROKE AND ACUTE CHEST SYNDROME (hypoxia plus infiltrate, not chest pain).  DO NOT TRANSFUSE FOR ANEMIA      -------------------------------------------    Sickle Cell Disease Comprehensive Checklist    Bone Health/Avascular Necrosis Screening/Symptoms (each visit): none today    Leg Ulcer evaluation (every visit): none    Hypertension (every visit): none    Last ophthalmologic exam: within last year (timing unsure)    Last urinalysis for microalbuminuria/CKD (annually): within last year    Last pulmonary evaluation (asthma, AMAN, pulm HTN): within last  year    Stroke/silent cerebral infarct Hx (Y/N): Yes    Last PCP Visit: 2/2020    Vaccines:  o PCV13: 5/13/19  o Pneumovax (PPSV23): 3/04, 10/09, 7/12/19 (next due 7/2024)  o Menactra: 4/2010, 9/2015 (MCV due Sept 2020)  o Trumemba:  o Influenza: got 19-20 vaccine    Past Medical History:  Past Medical History:   Diagnosis Date     Anemia      Anxiety      Bleeding disorder (H)      Blood clotting disorder (H)      Cerebral infarction (H) 2015     Cognitive developmental delay     low IQ. 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 (H)     right hand contractures     Iron overload due to repeated red blood cell transfusions      Migraines      Oppositional defiant behavior      Uncomplicated asthma        Past Surgical History:  Past Surgical History:   Procedure Laterality Date     AS INSERT TUNNELED CV 2 CATH W/O PORT/PUMP       C BREAST REDUCTION (INCLUDES LIPO) TIER 3 Bilateral 04/23/2019     REPAIR TENDON ELBOW Right 10/2/2019    Procedure: Right Elbow Flexor Lengthening, Flexor Pronator Slide Of Wrist and Finger, Thumb Adductor Lengthening;  Surgeon: Anai Franco MD;  Location: [facility] OR     TONSILLECTOMY Bilateral 10/2/2019    Procedure: Bilateral Tonsillectomy;  Surgeon: Farhana Guy MD;  Location: [facility] OR       Family History:   Family History   Problem Relation Age of Onset     Sickle Cell Trait Mother      Sickle Cell Trait Father        Social History:  Social History     Socioeconomic History     Marital status: Single     Spouse name: Not on file     Number of children: Not on file     Years of education: Not on file     Highest education level: Not on file   Occupational History     Not on file   Social Needs     Financial resource strain: Not on file     Food insecurity     Worry: Not on file     Inability: Not on file     Transportation needs      "Medical: Not on file     Non-medical: Not on file   Tobacco Use     Smoking status: Never Smoker     Smokeless tobacco: Never Used   Substance and Sexual Activity     Alcohol use: Never     Alcohol/week: 0.0 standard drinks     Drug use: Never     Sexual activity: Not Currently     Partners: Male     Birth control/protection: Other   Lifestyle     Physical activity     Days per week: Not on file     Minutes per session: Not on file     Stress: Not on file   Relationships     Social connections     Talks on phone: Not on file     Gets together: Not on file     Attends Taoism service: Not on file     Active member of club or organization: Not on file     Attends meetings of clubs or organizations: Not on file     Relationship status: Not on file     Intimate partner violence     Fear of current or ex partner: Not on file     Emotionally abused: Not on file     Physically abused: Not on file     Forced sexual activity: Not on file   Other Topics Concern     Not on file   Social History Narrative    Lives with mom and extended family but \"toxic environment\" per her report. She would like to move out but cannot financially do so. She has minimal support at home despite her significant SCD comorbidities and cognitive delay from stroke.       Medications:  Current Outpatient Medications   Medication     acetaminophen (TYLENOL) 325 MG tablet     aspirin (ASA) 81 MG tablet     Budesonide-Formoterol Fumarate (SYMBICORT IN)     celecoxib (CELEBREX) 100 MG capsule     cyproheptadine (PERIACTIN) 4 MG tablet     deferasirox (JADENU) 180 MG tablet     erythromycin ethylsuccinate (E.E.S.) 400 MG tablet     gabapentin (NEURONTIN) 300 MG capsule     GNP SENNA-LAX 8.6 MG tablet     hydroxyurea (HYDREA) 500 MG capsule CHEMO     JADENU 360 MG tablet     omeprazole (PRILOSEC) 40 MG DR capsule     ondansetron (ZOFRAN) 8 MG tablet     oxyCODONE IR (ROXICODONE) 10 MG tablet     OXYCONTIN 10 MG 12 hr tablet     pregabalin (LYRICA) 75 MG " capsule     PROAIR  (90 Base) MCG/ACT inhaler     sertraline (ZOLOFT) 100 MG tablet     EPINEPHrine (EPIPEN) 0.3 MG/0.3ML injection     No current facility-administered medications for this visit.          Physical Exam:   /79   Pulse 90   Temp 98.5  F (36.9  C) (Oral)   Resp 14   Wt 68.9 kg (152 lb)   LMP  (LMP Unknown)   SpO2 99%   Breastfeeding No   BMI 26.09 kg/m      GENERAL APPEARANCE: Himanshu is a young adult, AA female; she was reserved at first but opened up with time (consistent with the background that she can be tense and defensive in new situations)  EYES: Eyes grossly normal to inspection, PERRL and conjunctivae normal, mild icterus, extraocular movements intact  HENT: nose and mouth without ulcers or lesions, oropharynx clear and oral mucous membranes moist  RESP: lungs clear to auscultation - no rales, rhonchi or wheezes  CHEST: port in place  CV: regular rate and rhythm, normal S1 S2, no murmur, click or rub,  MS: right hand with spastic hemiparesis. She is self-conscious about this. Otherwise her gait is without significant ataxia (slight limp but not antalgic)  NEURO: Normal strength and tone aside from right hand, sensory exam grossly normal, mentation intact and speech normal  PSYCH: still seems anxious and withdrawn but did smile at times.      Labs:   Results for HIMANSHU AL (MRN [id number]) as of 3/14/2020 10:51   Ref. Range 3/13/2020 14:25   Sodium Latest Ref Range: 133 - 144 mmol/L 138   Potassium Latest Ref Range: 3.4 - 5.3 mmol/L 3.8   Chloride Latest Ref Range: 94 - 109 mmol/L 110 (H)   Carbon Dioxide Latest Ref Range: 20 - 32 mmol/L 22   Urea Nitrogen Latest Ref Range: 7 - 30 mg/dL 8   Creatinine Latest Ref Range: 0.52 - 1.04 mg/dL 0.50 (L)   GFR Estimate Latest Ref Range: >60 mL/min/1.73_m2 >90   GFR Estimate If Black Latest Ref Range: >60 mL/min/1.73_m2 >90   Calcium Latest Ref Range: 8.5 - 10.1 mg/dL 8.8   Anion Gap Latest Ref Range: 3 - 14 mmol/L 6   Albumin  Latest Ref Range: 3.4 - 5.0 g/dL 4.3   Protein Total Latest Ref Range: 6.8 - 8.8 g/dL 8.1   Bilirubin Total Latest Ref Range: 0.2 - 1.3 mg/dL 3.2 (H)   Alkaline Phosphatase Latest Ref Range: 40 - 150 U/L 78   ALT Latest Ref Range: 0 - 50 U/L 54 (H)   AST Latest Ref Range: 0 - 45 U/L 49 (H)   Ferritin Latest Ref Range: 12 - 150 ng/mL 11,732 (H)   Glucose Latest Ref Range: 70 - 99 mg/dL 87   WBC Latest Ref Range: 4.0 - 11.0 10e9/L 16.2 (H)   Hemoglobin Latest Ref Range: 11.7 - 15.7 g/dL 9.4 (L)   Hematocrit Latest Ref Range: 35.0 - 47.0 % 28.2 (L)   Platelet Count Latest Ref Range: 150 - 450 10e9/L 246   RBC Count Latest Ref Range: 3.8 - 5.2 10e12/L 3.01 (L)   MCV Latest Ref Range: 78 - 100 fl 94   MCH Latest Ref Range: 26.5 - 33.0 pg 31.2   MCHC Latest Ref Range: 31.5 - 36.5 g/dL 33.3   RDW Latest Ref Range: 10.0 - 15.0 % 19.7 (H)   Diff Method Unknown Automated Method   % Neutrophils Latest Units: % 70.8   % Lymphocytes Latest Units: % 18.9   % Monocytes Latest Units: % 7.4   % Eosinophils Latest Units: % 0.8   % Basophils Latest Units: % 1.2   % Immature Granulocytes Latest Units: % 0.9   Nucleated RBCs Latest Ref Range: 0 /100 2 (H)   Absolute Neutrophil Latest Ref Range: 1.6 - 8.3 10e9/L 11.5 (H)   Absolute Lymphocytes Latest Ref Range: 0.8 - 5.3 10e9/L 3.1   Absolute Monocytes Latest Ref Range: 0.0 - 1.3 10e9/L 1.2   Absolute Eosinophils Latest Ref Range: 0.0 - 0.7 10e9/L 0.1   Absolute Basophils Latest Ref Range: 0.0 - 0.2 10e9/L 0.2   Abs Immature Granulocytes Latest Ref Range: 0 - 0.4 10e9/L 0.1   Absolute Nucleated RBC Unknown 0.4         Imaging: none today    Assessment:  Jennifer Cervantes is a 20 year old female with HbSS. Her disease is complicated by stroke leading to significant cognitive delay and right UE hemiparesis, high anxiety leading to frequent pain crises on top of chronic pain syndrome, poor social structure at home with no real support, and iron overload secondary to repeated transfusions. She  is in clinic today for regularly scheduled follow up     Plan:  1) HbSS and complications   -- Pain plan reviewed               -- Recommending a trial back on Celebrex as adjunct therapy (not taking other NSAIDs besides ASA   --Needs monthly transfusions (next visit in 2 weeks)   --Needs aggressive chelation. Need to determine last Ferriscan   --Consider neuropsych testing given her delay history   --Continue ASA post-stroke  2) Labs: CBC, CMP, ferritin, Hgb ELP  3) Medication Changes: restarting Celebrex. Oxycodone refilled for q6h dosing and 2 weeks (56 tabs)  4)  Other orders/recommendations: Tour has been deferred due to COVID-19 concerns.  5)  HCM: PCP is Daya Carter. Need to get SW closely involved  6)  Follow up plan: Due to COVID-19 concerns, we will likely need to plan for limiting to every other week visits with me, next for transfusion. She is at high risk for LTFU and complications from a horrendous social situation and developmental delay so her access to care is essential to be in person. May benefit from crizanlizumab    It was a pleasure to see Jennifer today and to guide in her care moving forward    I spent a total of 30 minutes face-to-face with Jennifer during today's office visit. Over 50% of this time was spent counseling the patient and/or coordinating care regarding the continued steps to transition to adulthood for SCD, review the major comorbidities that she currently has, and why we should trial Celebrex again. See note for details.        Eric Duncan MD  Hematologist  Division of Hematology, Oncology, and Transplantation  [facility]  [facility] [place]  Pager: [phone]

## 2020-03-13 NOTE — TELEPHONE ENCOUNTER
Writer placed call to patient to remind of appointment time. Patient stated that she knew she had an appointment and would be here this afternoon.

## 2020-03-13 NOTE — NURSING NOTE
"Oncology Rooming Note    March 13, 2020 12:39 PM   Jennifer Cervantes is a 20 year old female who presents for:    Chief Complaint   Patient presents with     Oncology Clinic Visit     Return; Sickle Cell Anemia     Initial Vitals: /79   Pulse 90   Temp 98.5  F (36.9  C) (Oral)   Resp 14   Wt 68.9 kg (152 lb)   LMP  (LMP Unknown)   SpO2 99%   Breastfeeding No   BMI 26.09 kg/m   Estimated body mass index is 26.09 kg/m  as calculated from the following:    Height as of 2/28/20: 1.626 m (5' 4\").    Weight as of this encounter: 68.9 kg (152 lb). Body surface area is 1.76 meters squared.  No Pain (0) Comment: Data Unavailable   No LMP recorded (lmp unknown). Patient has had an injection.  Allergies reviewed: Yes  Medications reviewed: Yes    Medications: Medication refills not needed today.  Pharmacy name entered into [employer]:    [facility] - [place], [place] - [address]  [id number] - [place], [place] - [address]    Clinical concerns: Refill on oxycodone;        Roz Conway CMA              "

## 2020-03-14 NOTE — PROGRESS NOTES
"Sickle Cell Clinic Follow Up Outpatient Visit    Date of visit: 03/13/2020    Jennifer Cervantes is a 19 year old female who is here for a follow up outpatient hematology visit for Hb SS, having recently initiated care. Jennifer was referred by Jamaal Collins.  Jennifer Cervantes is here today with alone    Interval History  No recent ill symptoms. She has been out of the hospital most of the last week and a half. She was admitted for her 20st birthday but did have some smiles in the room when she recalled Dr. More and the team getting her flowers and a cake. She maintains that this show of support was more than she would ever have at home. No cough or cold symptoms recently. She saw Dr. Collins earlier this week or last to discuss her cardiac MRI findings (reassuring) and said he gave her enough oxycodone to get to this appointment, taking 1 tab every 4-6 hours but she is out today. She does not need more oxycontin and feels comfortable with her current home regimen. She reports good recognition of which pill is which, though she takes a lot of them and does not know all of them off hand. She was on Celebrex a few months ago but was told to stop during one admission. It was not clear why she was to stop but she still has some at home. No other acute concerns.    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 She also has significant anxiety and depression with a strong anxiety about transferring to the adult clinic. She usually has pain crises secondary to the anxiety. This \"fear of the unknown\" is significant enough that she wants to tour the inpatient floor before the transition or before she gets admitted " there. She has been admitted to [facility] most of the last few months with recurrent pain crises and is actually out on pass now but is still admitted as of Friday 2/28. She has no real support from family at home and that, combined with her cognitive delays, make her care even more complex. She is having some back pain today which is a frequent location for her. She does say that her oral options do take an edge off. No fevers or cough, chest pain, dyspnea, bruising, or GI symptoms today. She has gotten a couple doses of Desferal for iron overload as she has been on chronic transfusions post-stroke for a long time. She is up to date on immunizations and got to meet with Daya Carter last week (also while on pass). It is unclear when she will discharge from [facility] but based on recent history, she thinks she will have a pain crisis soon after discharge.    Key results prior to referral:  MR ABDOMEN W/O CONTRAST (Ferriscan), 10/3/2017     Comparison: 12/28/2015   Clinical history: Sickle cell disease   Findings:     Average liver iron concentration:  28.6 mg/g dry tissue, previously 22.8 mg/g dry tissue (NR: 0.17-1.8)  513 mmol/kg dry tissue, previously 400.8 mmol/kg dry tissue (NR: 3-33)     There is also iron deposition in the spleen, which is enlarged.  Prominent vessels in the left upper quadrant, which may be related to varices, are unchanged from the comparison examinations.                                                            Impression:  1. Increase in elevated liver concentration.  2. Splenomegaly. Question portal hypertension.     WAYNE CURTIS MD    11/4/19  MRI Pelvis  Summary:  Marrow changes consistent with SCD but no convincing evidence of AVN at this time.     1/28/20 MRI/MRA  Comparison 5/8/15  Summary:  Stable appearance of the brain and cerebral vasculature compared to 5/8/2015, including remote left MCA teritory infarct. No evidence of new infarct or new abnormality on  MRA    2/26/20  EGD pathology  Proximal biopsy, esophagus: Rare intraepithelial eosinophils  Distal biopsy, esophagus: Active esophagitis: Peak eosinophil count = 18/hpf  Stomach biopsy: Minimal changes suggestive of reactive gastropathy  Duodenal biopsy: mild nonspecific changes    3/10/2020 Cardiac MRI      HEIGHT: 64.00 in    (162.56 cm)    WEIGHT: 150.00 lbs    (68.04 kgs)    BSA: 1.73 m^2        SUMMARY    ==========================================================================================================        Structurally normal heart.    Normal right ventricular size and systolic function.    Normal left ventricular size and systolic function.    T2* liver:2-3 msec (consistent with iron deposition)    T2* myocardium: 32-35 msec (normal). .         LEFT VENTRICLE: The left ventricle is normal. Quantitative LVEF 59%.        RIGHT VENTRICLE: The right ventricle is normal. Quantitative RVEF 61%.        LV/RV SEPTUM: The LV/RV septum is normal.        LA/RA SEPTUM: The LA/RA septum is normal.        LEFT ATRIUM: The left atrium is normal.        RIGHT ATRIUM: The right atrium is normal.        PERICARDIUM: The pericardium is normal.        PLEURAL EFFUSION: The pleural effusion is normal.        AORTIC VALVE: The aortic valve is normal.        MITRAL VALVE: The mitral valve is normal.        TRICUSPID VALVE: The tricuspid valve is normal.        PULMONIC VALVE: The pulmonic valve is normal.        AORTIC ROOT: The aortic root is normal.            CORE EXAM    ==========================================================================================================        MEASUREMENTS    ----------------------------------------------------------------------------------------       VOLUMETRIC ANALYSIS        ----------------------------------------------    .------------------------------------------------------.                   LV    Reference  RV    Reference       +------+--------+------+-----------+------+------------+     EDV   ml       121   ()   117   (100-184)            ml/m^2  69.9   (65-99)   67.6   ()       ESV   ml        50   (29-66)     46   (29-82)              ml/m^2  28.9   (19-37)   26.6   (20-45)        CO                                                   MASS                                                 SV    ml        71   ()    71   ()             ml/m^2  41.0   (42-66)   41.0   (39-63)        EF    %         59   (56-75)     61   (49-73)       '------+--------+------+-----------+------+------------'          Review of systems:  A complete 14 point review of systems was completed. All were negative except for what was reported in the HPI or highlighted here.    --------------------------------  Plan last reviewed with patient: 3/13/2020    Patient background: 21yo F, enjoys movies and kids though there are times where she does not really want to talk to people. Does not have a lot of social support at home.     Sickle Cell Disease History  Primary Hematologist: Perla  Genotype: SS  Acute Pain Crisis Treatment:    ER/Acute Care/Infusion Clinic:   o Morphine 1 mg IVP/SC Q1H X 3 doses  o Toradol  o Maintenance IV fluids with D5 half-normal saline  o Other: Benadryl PO and Zofran 8 mg IV PRN itching or nausea    Inpatient:  o Opioid: Morphine 1 mg IV Q1H PRN until PCA starts  o PCA plan: (Consistent with [facility] pain plan)  - PCA button dose: Morphine 0.7 mg  - Lockout: 10 minutes  - Continuous Infusion: consider 1 mg/hr  - One hr max: 4.2 mg  o [facility] often had success weaning to OxyCONTIN 10 mg q12h with Morphine 0.7 mg IV q10 min PRN rather than continuous infusion  o Other Medications: Benadryl, Zofran  o If PCA not working, she Has had success with ketamine starting at 4mg/h and advancing only to 6mg/h, as 8mg/h made her feel quite poorly.  o If  giving scheduled toradol, hold ASA (ok to give celebrex if she prefers)  o Supportive Care: Docusate, Senna  Chronic Pain Medications:    Home regimen as of 2/4/20 at [facility]: OxyCONTIN 10 mg po q 12 hrs and oxycodone 5-10 mg p.o. q.4-6 hours p.r.n. breakthrough   pain.  She also continues on Celebrex, and Zoloft among other medications.    -Also benefits from mental health visits, acupuncture  Baseline Hemoglobin: 9.5 g/dL (on chronic transfusions)  Hydroxyurea use: Yes  H/O blood transfusions: Yes, several (iron overload)    H/O Transfusion Reactions: no    Antibodies:none  H/O Acute Chest Syndrome: Yes    Last episode: 10/2019     ICU/intubation: unsure  H/O Stroke: Yes (managed with chronic transfusions (has left her with neurodevelopmental deficits, low IQ)  H/O VTE: no  H/O Cholecystectomy or Splenectomy: no  H/O Asthma, Pulm HTN, AVN, Leg Ulcers, Nephropathy, Retinopathy, etc: Iron overload, asthma, chronic lung disease    EMERGENCY CARE PLAN  DO NOT TRANSFUSE WITHOUT DISCUSSING WITH HEMATOLOGY ATTENDING (available 24/7).       TRANSFUSION IS RISKY DUE TO RISK OF ALLOIMMUNIZATION AGAINST RBC ANTIGENS AND IRON OVERLOAD.  INDICATIONS FOR TRANSFUSION ARE ACUTE STROKE AND ACUTE CHEST SYNDROME (hypoxia plus infiltrate, not chest pain).  DO NOT TRANSFUSE FOR ANEMIA      -------------------------------------------    Sickle Cell Disease Comprehensive Checklist    Bone Health/Avascular Necrosis Screening/Symptoms (each visit): none today    Leg Ulcer evaluation (every visit): none    Hypertension (every visit): none    Last ophthalmologic exam: within last year (timing unsure)    Last urinalysis for microalbuminuria/CKD (annually): within last year    Last pulmonary evaluation (asthma, AMAN, pulm HTN): within last year    Stroke/silent cerebral infarct Hx (Y/N): Yes    Last PCP Visit: 2/2020    Vaccines:  o PCV13: 5/13/19  o Pneumovax (PPSV23): 3/04, 10/09, 7/12/19 (next due 7/2024)  o Menactra: 4/2010, 9/2015 (MCV  due Sept 2020)  o Trumemba:  o Influenza: got 19-20 vaccine    Past Medical History:  Past Medical History:   Diagnosis Date     Anemia      Anxiety      Bleeding disorder (H)      Blood clotting disorder (H)      Cerebral infarction (H) 2015     Cognitive developmental delay     low IQ. 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 (H)     right hand contractures     Iron overload due to repeated red blood cell transfusions      Migraines      Oppositional defiant behavior      Uncomplicated asthma        Past Surgical History:  Past Surgical History:   Procedure Laterality Date     AS INSERT TUNNELED CV 2 CATH W/O PORT/PUMP       C BREAST REDUCTION (INCLUDES LIPO) TIER 3 Bilateral 04/23/2019     REPAIR TENDON ELBOW Right 10/2/2019    Procedure: Right Elbow Flexor Lengthening, Flexor Pronator Slide Of Wrist and Finger, Thumb Adductor Lengthening;  Surgeon: Anai Franco MD;  Location: [facility] OR     TONSILLECTOMY Bilateral 10/2/2019    Procedure: Bilateral Tonsillectomy;  Surgeon: Farhana Guy MD;  Location: [facility] OR       Family History:   Family History   Problem Relation Age of Onset     Sickle Cell Trait Mother      Sickle Cell Trait Father        Social History:  Social History     Socioeconomic History     Marital status: Single     Spouse name: Not on file     Number of children: Not on file     Years of education: Not on file     Highest education level: Not on file   Occupational History     Not on file   Social Needs     Financial resource strain: Not on file     Food insecurity     Worry: Not on file     Inability: Not on file     Transportation needs     Medical: Not on file     Non-medical: Not on file   Tobacco Use     Smoking status: Never Smoker     Smokeless tobacco: Never Used   Substance and Sexual Activity     Alcohol use: Never     Alcohol/week: 0.0  "standard drinks     Drug use: Never     Sexual activity: Not Currently     Partners: Male     Birth control/protection: Other   Lifestyle     Physical activity     Days per week: Not on file     Minutes per session: Not on file     Stress: Not on file   Relationships     Social connections     Talks on phone: Not on file     Gets together: Not on file     Attends Yazidi service: Not on file     Active member of club or organization: Not on file     Attends meetings of clubs or organizations: Not on file     Relationship status: Not on file     Intimate partner violence     Fear of current or ex partner: Not on file     Emotionally abused: Not on file     Physically abused: Not on file     Forced sexual activity: Not on file   Other Topics Concern     Not on file   Social History Narrative    Lives with mom and extended family but \"toxic environment\" per her report. She would like to move out but cannot financially do so. She has minimal support at home despite her significant SCD comorbidities and cognitive delay from stroke.       Medications:  Current Outpatient Medications   Medication     acetaminophen (TYLENOL) 325 MG tablet     aspirin (ASA) 81 MG tablet     Budesonide-Formoterol Fumarate (SYMBICORT IN)     celecoxib (CELEBREX) 100 MG capsule     cyproheptadine (PERIACTIN) 4 MG tablet     deferasirox (JADENU) 180 MG tablet     erythromycin ethylsuccinate (E.E.S.) 400 MG tablet     gabapentin (NEURONTIN) 300 MG capsule     GNP SENNA-LAX 8.6 MG tablet     hydroxyurea (HYDREA) 500 MG capsule CHEMO     JADENU 360 MG tablet     omeprazole (PRILOSEC) 40 MG DR capsule     ondansetron (ZOFRAN) 8 MG tablet     oxyCODONE IR (ROXICODONE) 10 MG tablet     OXYCONTIN 10 MG 12 hr tablet     pregabalin (LYRICA) 75 MG capsule     PROAIR  (90 Base) MCG/ACT inhaler     sertraline (ZOLOFT) 100 MG tablet     EPINEPHrine (EPIPEN) 0.3 MG/0.3ML injection     No current facility-administered medications for this visit.  "         Physical Exam:   /79   Pulse 90   Temp 98.5  F (36.9  C) (Oral)   Resp 14   Wt 68.9 kg (152 lb)   LMP  (LMP Unknown)   SpO2 99%   Breastfeeding No   BMI 26.09 kg/m      GENERAL APPEARANCE: Himanshu is a young adult, AA female; she was reserved at first but opened up with time (consistent with the background that she can be tense and defensive in new situations)  EYES: Eyes grossly normal to inspection, PERRL and conjunctivae normal, mild icterus, extraocular movements intact  HENT: nose and mouth without ulcers or lesions, oropharynx clear and oral mucous membranes moist  RESP: lungs clear to auscultation - no rales, rhonchi or wheezes  CHEST: port in place  CV: regular rate and rhythm, normal S1 S2, no murmur, click or rub,  MS: right hand with spastic hemiparesis. She is self-conscious about this. Otherwise her gait is without significant ataxia (slight limp but not antalgic)  NEURO: Normal strength and tone aside from right hand, sensory exam grossly normal, mentation intact and speech normal  PSYCH: still seems anxious and withdrawn but did smile at times.      Labs:   Results for HIMANSHU AL (MRN [id number]) as of 3/14/2020 10:51   Ref. Range 3/13/2020 14:25   Sodium Latest Ref Range: 133 - 144 mmol/L 138   Potassium Latest Ref Range: 3.4 - 5.3 mmol/L 3.8   Chloride Latest Ref Range: 94 - 109 mmol/L 110 (H)   Carbon Dioxide Latest Ref Range: 20 - 32 mmol/L 22   Urea Nitrogen Latest Ref Range: 7 - 30 mg/dL 8   Creatinine Latest Ref Range: 0.52 - 1.04 mg/dL 0.50 (L)   GFR Estimate Latest Ref Range: >60 mL/min/1.73_m2 >90   GFR Estimate If Black Latest Ref Range: >60 mL/min/1.73_m2 >90   Calcium Latest Ref Range: 8.5 - 10.1 mg/dL 8.8   Anion Gap Latest Ref Range: 3 - 14 mmol/L 6   Albumin Latest Ref Range: 3.4 - 5.0 g/dL 4.3   Protein Total Latest Ref Range: 6.8 - 8.8 g/dL 8.1   Bilirubin Total Latest Ref Range: 0.2 - 1.3 mg/dL 3.2 (H)   Alkaline Phosphatase Latest Ref Range: 40 - 150 U/L 78    ALT Latest Ref Range: 0 - 50 U/L 54 (H)   AST Latest Ref Range: 0 - 45 U/L 49 (H)   Ferritin Latest Ref Range: 12 - 150 ng/mL 11,732 (H)   Glucose Latest Ref Range: 70 - 99 mg/dL 87   WBC Latest Ref Range: 4.0 - 11.0 10e9/L 16.2 (H)   Hemoglobin Latest Ref Range: 11.7 - 15.7 g/dL 9.4 (L)   Hematocrit Latest Ref Range: 35.0 - 47.0 % 28.2 (L)   Platelet Count Latest Ref Range: 150 - 450 10e9/L 246   RBC Count Latest Ref Range: 3.8 - 5.2 10e12/L 3.01 (L)   MCV Latest Ref Range: 78 - 100 fl 94   MCH Latest Ref Range: 26.5 - 33.0 pg 31.2   MCHC Latest Ref Range: 31.5 - 36.5 g/dL 33.3   RDW Latest Ref Range: 10.0 - 15.0 % 19.7 (H)   Diff Method Unknown Automated Method   % Neutrophils Latest Units: % 70.8   % Lymphocytes Latest Units: % 18.9   % Monocytes Latest Units: % 7.4   % Eosinophils Latest Units: % 0.8   % Basophils Latest Units: % 1.2   % Immature Granulocytes Latest Units: % 0.9   Nucleated RBCs Latest Ref Range: 0 /100 2 (H)   Absolute Neutrophil Latest Ref Range: 1.6 - 8.3 10e9/L 11.5 (H)   Absolute Lymphocytes Latest Ref Range: 0.8 - 5.3 10e9/L 3.1   Absolute Monocytes Latest Ref Range: 0.0 - 1.3 10e9/L 1.2   Absolute Eosinophils Latest Ref Range: 0.0 - 0.7 10e9/L 0.1   Absolute Basophils Latest Ref Range: 0.0 - 0.2 10e9/L 0.2   Abs Immature Granulocytes Latest Ref Range: 0 - 0.4 10e9/L 0.1   Absolute Nucleated RBC Unknown 0.4         Imaging: none today    Assessment:  Jennifer Cervantes is a 20 year old female with HbSS. Her disease is complicated by stroke leading to significant cognitive delay and right UE hemiparesis, high anxiety leading to frequent pain crises on top of chronic pain syndrome, poor social structure at home with no real support, and iron overload secondary to repeated transfusions. She is in clinic today for regularly scheduled follow up     Plan:  1) HbSS and complications   -- Pain plan reviewed               -- Recommending a trial back on Celebrex as adjunct therapy (not taking other  NSAIDs besides ASA   --Needs monthly transfusions (next visit in 2 weeks)   --Needs aggressive chelation. Need to determine last Ferriscan   --Consider neuropsych testing given her delay history   --Continue ASA post-stroke  2) Labs: CBC, CMP, ferritin, Hgb ELP  3) Medication Changes: restarting Celebrex. Oxycodone refilled for q6h dosing and 2 weeks (56 tabs)  4)  Other orders/recommendations: Tour has been deferred due to COVID-19 concerns.  5)  HCM: PCP is Daya Carter. Need to get SW closely involved  6)  Follow up plan: Due to COVID-19 concerns, we will likely need to plan for limiting to every other week visits with me, next for transfusion. She is at high risk for LTFU and complications from a horrendous social situation and developmental delay so her access to care is essential to be in person. May benefit from crizanlizumab    It was a pleasure to see Jennifer today and to guide in her care moving forward    I spent a total of 30 minutes face-to-face with Jennifer during today's office visit. Over 50% of this time was spent counseling the patient and/or coordinating care regarding the continued steps to transition to adulthood for SCD, review the major comorbidities that she currently has, and why we should trial Celebrex again. See note for details.        Eric Duncan MD  Hematologist  Division of Hematology, Oncology, and Transplantation  [facility]  [employer] [place]  Pager: [phone]

## 2020-03-16 LAB
RESULT: NORMAL
SEND OUTS MISC TEST CODE: NORMAL
SEND OUTS MISC TEST SPECIMEN: NORMAL
TEST NAME: NORMAL

## 2020-03-17 ENCOUNTER — VIRTUAL VISIT (OUTPATIENT)
Dept: ONCOLOGY | Facility: CLINIC | Age: 20
End: 2020-03-17
Attending: PEDIATRICS
Payer: COMMERCIAL

## 2020-03-17 DIAGNOSIS — D57.1 HB-SS DISEASE WITHOUT CRISIS (H): Primary | ICD-10-CM

## 2020-03-17 DIAGNOSIS — G47.00 INSOMNIA, UNSPECIFIED TYPE: ICD-10-CM

## 2020-03-17 PROCEDURE — 40000114 ZZH STATISTIC NO CHARGE CLINIC VISIT

## 2020-03-17 PROCEDURE — 99214 OFFICE O/P EST MOD 30 MIN: CPT | Mod: TEL | Performed by: PHYSICIAN ASSISTANT

## 2020-03-17 RX ORDER — SERTRALINE HYDROCHLORIDE 100 MG/1
150 TABLET, FILM COATED ORAL DAILY
Qty: 30 TABLET | Refills: 0 | COMMUNITY
Start: 2020-03-17 | End: 2020-05-22

## 2020-03-17 RX ORDER — DIPHENHYDRAMINE HCL 25 MG
25-50 CAPSULE ORAL
Qty: 60 CAPSULE | Refills: 3 | Status: SHIPPED | OUTPATIENT
Start: 2020-03-17 | End: 2020-11-16

## 2020-03-17 NOTE — PROGRESS NOTES
"Jennifer Cervantes is a 20 year old female who is being evaluated via a billable telephone visit.      The patient has been notified of following:     \"This telephone visit will be conducted via a call between you and your physician/provider. We have found that certain health care needs can be provided without the need for a physical exam.  This service lets us provide the care you need with a short phone conversation.  If a prescription is necessary we can send it directly to your pharmacy.  If lab work is needed we can place an order for that and you can then stop by our lab to have the test done at a later time.    If during the course of the call the physician/provider feels a telephone visit is not appropriate, you will not be charged for this service.\"       Jennifer Cervantes complains of    Chief Complaint   Patient presents with     Telephone     SICKLE CELL ANEMIA       I have reviewed and updated the patient's Past Medical History, Social History, Family History and Medication List.    ALLERGIES  Fish-derived products; Seafood; Contrast dye; Diagnostic x-ray materials; and Gadolinium    Please call patient on Mobile phone, [phone]    Allergies reviewed: Yes  Medications reviewed: Yes    Medications: Medication refills not needed today.  Pharmacy name entered into [employer]:      [id number] - [address] AT [address] [facility] [address]      No new concerns today Dee Dee Lee CMA March 17, 2020  2:45 PM     Additional provider notes:     Hematologic History:  Jennifer Cervantes is a 20 year old female with sickle cell HgbSS complicated by frequent pain crises (acute and chronic components), history of stroke leading to significant cognitive delays and right upper extremity hemiparesis, iron overload 2/2 chronic transfusions as secondary ppx post-CVA, anxiety/depression. Please see Dr. Duncan's detailed note from 2/28/20 for complete hematologic history.      Phone Visit:  Jennifer" was called for her visit today. Overall she is feeling well. She states things at home are going OK but she is wanting to start looking for her own apartment. She state her back, arm, and leg pain is well controlled with her OxyContin and Oxycodone, taking an average of 4 per day. She denies any infectious symptoms. She is eating and drinking well and denies any GI concerns. She does admit her anxiety and depression is worse and she is wanting to change her serotonin specific reuptake inhibitor. [facility] and does not yet want to switch to adult providers. She also is having trouble sleeping and states Melatonin does not help. She is wondering about getting a new ankle brace as her currently brace is very large and she has to wear different size shoes.     Assessment/Plan:  HgbSS disease without crisis D57.1    1. Sickle Cell Disease  Overall Jennifer is doing well. Her pain is under good control with current regimen. She denies any significant concerns today and plans to keep her follow-up with Dr. Duncan next week. We did not discuss her sickle cell maintenance today due to nature of visit, see Dr. Duncan prior note for full details.    She will continue Jadenu for iron overload. Continue Hydrea. Continue ASA for stroke ppx. Also plan for monthly transfusion- this is not scheduled yet and will reach out to coordinate this. Future plan for crizalizumab-not discussed today.    Chronic pain meds include Tylenol, Celebrex, Gabapentin, OxyContin, Oxycodone. Pain under good control and denies needing refills today.     2. Psych  Significant anxiety, [facility]. Declines meeting adult mental health provider at this time. She has been on Zoloft 100mg for quite some time-will try increasing to 150mg daily and monitor for improvement over next few weeks. Could go up to 200mg if still struggling. Would recommend having her see psych if still an issue to do  further medication adjustment.    Will try Benadryl as needed for insomnia.     Did reach out to social work to help with housing concerns.     I have reviewed the note as documented above.  This accurately captures the substance of my conversation with the patient.    Phone call contact time  Call Started at 2:47pm  Call Ended at 3:16pm    RONALDO Allan

## 2020-03-17 NOTE — LETTER
"  3/17/2020      RE: Jennifer Cervantes  [address]  [facility] [address]       Telephone visit      Jennifer Cervantes is a 20 year old female who is being evaluated via a billable telephone visit.      The patient has been notified of following:     \"This telephone visit will be conducted via a call between you and your physician/provider. We have found that certain health care needs can be provided without the need for a physical exam.  This service lets us provide the care you need with a short phone conversation.  If a prescription is necessary we can send it directly to your pharmacy.  If lab work is needed we can place an order for that and you can then stop by our lab to have the test done at a later time.    If during the course of the call the physician/provider feels a telephone visit is not appropriate, you will not be charged for this service.\"       Jennifer Cervantes complains of    Chief Complaint   Patient presents with     Telephone     SICKLE CELL ANEMIA       I have reviewed and updated the patient's Past Medical History, Social History, Family History and Medication List.    ALLERGIES  Fish-derived products; Seafood; Contrast dye; Diagnostic x-ray materials; and Gadolinium    Please call patient on Mobile phone, [phone]    Allergies reviewed: Yes  Medications reviewed: Yes    Medications: Medication refills not needed today.  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 Please " see Dr. Duncan's detailed note from 2/28/20 for complete hematologic history.      Phone Visit:  Jennifer was called for her visit today. Overall she is feeling well. She states things at home are going OK but she is wanting to start looking for her own apartment. She state her back, arm, and leg pain is well controlled with her OxyContin and Oxycodone, taking an average of 4 per day. She denies any infectious symptoms. She is eating and drinking well and denies any GI concerns. She does admit her anxiety and depression is worse and she is wanting to change her serotonin specific reuptake inhibitor. She [facility] and does not yet want to switch to adult providers. She also is having trouble sleeping and states Melatonin does not help. She is wondering about getting a new ankle brace as her currently brace is very large and she has to wear different size shoes.     Assessment/Plan:  HgbSS disease without crisis D57.1    1. Sickle Cell Disease  Overall Jennifer is doing well. Her pain is under good control with current regimen. She denies any significant concerns today and plans to keep her follow-up with Dr. Duncan next week. We did not discuss her sickle cell maintenance today due to nature of visit, see Dr. Duncan prior note for full details.    She will continue Jadenu for iron overload. Continue Hydrea. Continue ASA for stroke ppx. Also plan for monthly transfusion- this is not scheduled yet and will reach out to coordinate this. Future plan for crizalizumab-not discussed today.    Chronic pain meds include Tylenol, Celebrex, Gabapentin, OxyContin, Oxycodone. Pain under good control and denies needing refills today.     2. Psych  Significant anxiety, [facility]. Declines meeting adult mental health provider at this time. She has been on Zoloft 100mg for quite some time-will try increasing to 150mg daily and monitor for improvement over next few  weeks. Could go up to 200mg if still struggling. Would recommend having her see psych if still an issue to do further medication adjustment.    Will try Benadryl as needed for insomnia.     Did reach out to social work to help with housing concerns.     I have reviewed the note as documented above.  This accurately captures the substance of my conversation with the patient.    Phone call contact time  Call Started at 2:47pm  Call Ended at 3:16pm    RONALDO Allan PA

## 2020-03-18 ENCOUNTER — TELEPHONE (OUTPATIENT)
Dept: CARE COORDINATION | Facility: CLINIC | Age: 20
End: 2020-03-18

## 2020-03-18 NOTE — TELEPHONE ENCOUNTER
"Social Work Note: Telephone Call  Oncology Clinic    Data/Intervention:  Patient Name:  Jennifer Cervantes  /Age:  2000 (20 year old)    Call From:  UDAY contacted patient by phone.  Reason for Call:  Referral from HANNY to follow up with patient regarding housing resources.    Assessment:  SW spoke with patient over the phone.  Patient is currently on waiting list for public housing and stated she has been on list for \"2 or 3 years now.\" She described feeling that she needs her own space and talking about how she has struggled with her transition to adulthood without an independent living space.  Pt indicated she may need to move into her mother's home but struggles with this as she would be sharing a room with her 10 year old sister.  SW provided empathetic listening, reflective listening and counseling on pt's processing and frustration with the disruption in her independence.  SW inquired about pt's income source, pt indicated she receives about 800 dollar per month through SSDI and SSI. She recognized the difficulty of budgeting for rent as well as daily living expenses on a restricted income and talked about wanting to also work part time to supplement this income.  SW talked about ways to privately search for an apartment in the community and suggested getting a roommate to help with the cost.  Patient also has a county  and an ILS worker who she indicated had been helping her with trying to find housing, but \"I haven't heard from them in a while.\" SW encouraged patient to follow up and reach out again with her county  and ILS worker.  SW encouraged patient to contact writer if any medical documentation of support is needed. Contact information provided.    Plan:  Patient will contact UDAY for any additional needs, questions or concerns. SW available to assist     Soo Yeon Han, MSW, LICSW  Pager: [phone]  Phone: [phone]        "

## 2020-03-19 PROBLEM — Z86.73 HISTORY OF STROKE: Status: ACTIVE | Noted: 2020-03-19

## 2020-03-19 PROBLEM — D57.1 HB-SS DISEASE WITHOUT CRISIS (H): Status: ACTIVE | Noted: 2020-03-19

## 2020-03-19 RX ORDER — HEPARIN SODIUM (PORCINE) LOCK FLUSH IV SOLN 100 UNIT/ML 100 UNIT/ML
5 SOLUTION INTRAVENOUS
Status: CANCELLED | OUTPATIENT
Start: 2020-03-27

## 2020-03-19 RX ORDER — HEPARIN SODIUM,PORCINE 10 UNIT/ML
5 VIAL (ML) INTRAVENOUS
Status: CANCELLED | OUTPATIENT
Start: 2020-03-27

## 2020-03-23 DIAGNOSIS — E83.111 IRON OVERLOAD DUE TO REPEATED RED BLOOD CELL TRANSFUSIONS: ICD-10-CM

## 2020-03-23 DIAGNOSIS — D57.1 HB-SS DISEASE WITHOUT CRISIS (H): Primary | ICD-10-CM

## 2020-03-27 ENCOUNTER — VIRTUAL VISIT (OUTPATIENT)
Dept: ONCOLOGY | Facility: CLINIC | Age: 20
End: 2020-03-27
Attending: PEDIATRICS
Payer: COMMERCIAL

## 2020-03-27 ENCOUNTER — HOSPITAL ENCOUNTER (INPATIENT)
Facility: CLINIC | Age: 20
LOS: 1 days | Discharge: HOME | DRG: 812 | End: 2020-03-29
Attending: EMERGENCY MEDICINE | Admitting: ORTHOPAEDIC SURGERY
Payer: COMMERCIAL

## 2020-03-27 ENCOUNTER — TELEPHONE (OUTPATIENT)
Dept: ONCOLOGY | Facility: CLINIC | Age: 20
End: 2020-03-27

## 2020-03-27 ENCOUNTER — NURSE TRIAGE (OUTPATIENT)
Dept: NURSING | Facility: CLINIC | Age: 20
End: 2020-03-27

## 2020-03-27 DIAGNOSIS — D57.1 HB-SS DISEASE WITHOUT CRISIS (H): Primary | ICD-10-CM

## 2020-03-27 DIAGNOSIS — D57.00 SICKLE CELL PAIN CRISIS (H): ICD-10-CM

## 2020-03-27 DIAGNOSIS — J45.909 ASTHMATIC BRONCHITIS WITHOUT COMPLICATION, UNSPECIFIED ASTHMA SEVERITY, UNSPECIFIED WHETHER PERSISTENT: ICD-10-CM

## 2020-03-27 DIAGNOSIS — R05.9 COUGH: ICD-10-CM

## 2020-03-27 LAB
ANION GAP SERPL CALCULATED.3IONS-SCNC: 8 MMOL/L (ref 3–14)
BUN SERPL-MCNC: 8 MG/DL (ref 7–30)
CALCIUM SERPL-MCNC: 8.9 MG/DL (ref 8.5–10.1)
CHLORIDE SERPL-SCNC: 105 MMOL/L (ref 94–109)
CO2 SERPL-SCNC: 22 MMOL/L (ref 20–32)
CREAT SERPL-MCNC: 0.56 MG/DL (ref 0.52–1.04)
GFR SERPL CREATININE-BSD FRML MDRD: >90 ML/MIN/{1.73_M2}
GLUCOSE SERPL-MCNC: 79 MG/DL (ref 70–99)
POTASSIUM SERPL-SCNC: 3.7 MMOL/L (ref 3.4–5.3)
SODIUM SERPL-SCNC: 136 MMOL/L (ref 133–144)

## 2020-03-27 PROCEDURE — 85025 COMPLETE CBC W/AUTO DIFF WBC: CPT | Performed by: EMERGENCY MEDICINE

## 2020-03-27 PROCEDURE — 85045 AUTOMATED RETICULOCYTE COUNT: CPT | Performed by: EMERGENCY MEDICINE

## 2020-03-27 PROCEDURE — 25800025 ZZH RX 258: Performed by: EMERGENCY MEDICINE

## 2020-03-27 PROCEDURE — 96374 THER/PROPH/DIAG INJ IV PUSH: CPT | Performed by: EMERGENCY MEDICINE

## 2020-03-27 PROCEDURE — 80048 BASIC METABOLIC PNL TOTAL CA: CPT | Performed by: EMERGENCY MEDICINE

## 2020-03-27 PROCEDURE — 99285 EMERGENCY DEPT VISIT HI MDM: CPT | Mod: Z6 | Performed by: EMERGENCY MEDICINE

## 2020-03-27 PROCEDURE — 99285 EMERGENCY DEPT VISIT HI MDM: CPT | Mod: 25 | Performed by: EMERGENCY MEDICINE

## 2020-03-27 PROCEDURE — 25000128 H RX IP 250 OP 636: Performed by: EMERGENCY MEDICINE

## 2020-03-27 PROCEDURE — 96375 TX/PRO/DX INJ NEW DRUG ADDON: CPT | Performed by: EMERGENCY MEDICINE

## 2020-03-27 RX ORDER — MORPHINE SULFATE 2 MG/ML
2 INJECTION, SOLUTION INTRAMUSCULAR; INTRAVENOUS ONCE
Status: COMPLETED | OUTPATIENT
Start: 2020-03-27 | End: 2020-03-28

## 2020-03-27 RX ORDER — KETOROLAC TROMETHAMINE 30 MG/ML
30 INJECTION, SOLUTION INTRAMUSCULAR; INTRAVENOUS ONCE
Status: COMPLETED | OUTPATIENT
Start: 2020-03-27 | End: 2020-03-27

## 2020-03-27 RX ORDER — OXYCODONE HYDROCHLORIDE 10 MG/1
10 TABLET, FILM COATED, EXTENDED RELEASE ORAL EVERY 12 HOURS
Qty: 60 TABLET | Refills: 0 | Status: SHIPPED | OUTPATIENT
Start: 2020-03-27 | End: 2020-04-27

## 2020-03-27 RX ORDER — ALBUTEROL SULFATE 0.83 MG/ML
2.5 SOLUTION RESPIRATORY (INHALATION) EVERY 6 HOURS PRN
Qty: 12 ML | Refills: 4 | Status: SHIPPED | OUTPATIENT
Start: 2020-03-27 | End: 2020-11-02

## 2020-03-27 RX ORDER — MORPHINE SULFATE 2 MG/ML
1 INJECTION, SOLUTION INTRAMUSCULAR; INTRAVENOUS ONCE
Status: COMPLETED | OUTPATIENT
Start: 2020-03-27 | End: 2020-03-27

## 2020-03-27 RX ORDER — OXYCODONE HYDROCHLORIDE 10 MG/1
TABLET ORAL
Qty: 56 TABLET | Refills: 0 | Status: ON HOLD | OUTPATIENT
Start: 2020-03-27 | End: 2020-04-11

## 2020-03-27 RX ADMIN — MORPHINE SULFATE 1 MG: 2 INJECTION, SOLUTION INTRAMUSCULAR; INTRAVENOUS at 23:01

## 2020-03-27 RX ADMIN — KETOROLAC TROMETHAMINE 30 MG: 30 INJECTION, SOLUTION INTRAMUSCULAR; INTRAVENOUS at 23:00

## 2020-03-27 RX ADMIN — DEXTROSE MONOHYDRATE AND SODIUM CHLORIDE: 5; .45 INJECTION, SOLUTION INTRAVENOUS at 23:01

## 2020-03-27 ASSESSMENT — ENCOUNTER SYMPTOMS
VOMITING: 0
FEVER: 0
SHORTNESS OF BREATH: 0
COUGH: 0
DIARRHEA: 0
NAUSEA: 0

## 2020-03-27 ASSESSMENT — MIFFLIN-ST. JEOR: SCORE: 1430.4

## 2020-03-27 NOTE — TELEPHONE ENCOUNTER
Pt will run out of oxycodone 10mg at 9pm tonight. Would like refill.     Routed to Dr Duncan. Has appt with him now.

## 2020-03-27 NOTE — PROGRESS NOTES
"Sickle Cell Clinic Follow Up Outpatient Visit    Date of Telephone encounter: 3/27/20    Dee Dee Lee, [facility] March 27, 2020  12:56 PM     ALLERGIES  Fish-derived products; Seafood; Contrast dye; Diagnostic x-ray materials; and Gadolinium      Jennifer Cervantes is being evaluated via a billable telephone visit due to COVID-19 clinic restrictions for in-person visits.      The patient has been notified of following:     \"This telephone visit will be conducted via a call between you and your physician/provider. We have found that certain health care needs can be provided without the need for a physical exam.  This service lets us provide the care you need with a short phone conversation.  If a prescription is necessary we can send it directly to your pharmacy.  If lab work is needed we can place an order for that and you can then stop by our lab to have the test done at a later time.  If during the course of the call the physician/provider feels a telephone visit is not appropriate, you will not be charged for this service.\"     I have reviewed and updated the patient's Past Medical History, Social History, Family History and Medication List.    ------------------------------------------------  Visit Notes    Jennifer Cervantes is a 19 year old female who is here for a follow up outpatient hematology visit for Hb SS, having recently initiated care. Jennifer was referred by Jamaal Collins.  Jennifer Cervantes is here today with alone    Interval History  No recent ill symptoms. She said she is having some daily pain but it is manageable with her oxycodone and Oxycontin. She did have to increase to regular oxycodone dosing in the last 2 days since she ran out of Oxycontin from [facility]. Otherwise, she is using the oxycodone 2-4 times a day but able to take some windows off it. No fevers. She has had sinus congestion with the weather warming up a bit. She said she had a good discussion with Farhana Garay, her " "psychologist from [facility], though she felt a bit awkward with doing it over the phone. She still is anxious about the new care environment and what it will be like while admitted but she and I are both excited that she has been out of the hospital for nearly a month. She said her appetite is good and has not had issues with stomach discomfort. She thinks she has gained a bit of weight and her family members have commented as well, though she would rather have the appetite be increased. She has increased the Zoloft to 150 mg as recommended by Andrei but does not feel significantly different yet. She is managing with these social restrictions as best as possible.    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 She also has significant anxiety and depression with a strong anxiety about transferring to the adult clinic. She usually has pain crises secondary to the anxiety. This \"fear of the unknown\" is significant enough that she wants to tour the inpatient floor before the transition or before she gets admitted there. She has been admitted to [facility] most of the last few months with recurrent pain crises and is actually out on pass now but is still admitted as of Friday 2/28. She has no real support from family at home and that, combined with her cognitive delays, make her care even more complex. She is having some back pain today which is a frequent location for her. She does say that her oral options do take an edge off. No fevers or cough, chest pain, dyspnea, bruising, or GI symptoms today. She has gotten a couple doses of Desferal for iron overload as she has been on chronic transfusions " post-stroke for a long time. She is up to date on immunizations and got to meet with Daya Carter last week (also while on pass). It is unclear [facility] but based on recent history, she thinks she will have a pain crisis soon after discharge.    Key results prior to referral:  MR ABDOMEN W/O CONTRAST (Ferriscan), 10/3/2017  (unlikely to be most recent one)     Comparison: 12/28/2015   Clinical history: Sickle cell disease   Findings:     Average liver iron concentration:  28.6 mg/g dry tissue, previously 22.8 mg/g dry tissue (NR: 0.17-1.8)  513 mmol/kg dry tissue, previously 400.8 mmol/kg dry tissue (NR: 3-33)     There is also iron deposition in the spleen, which is enlarged.  Prominent vessels in the left upper quadrant, which may be related to varices, are unchanged from the comparison examinations.                                                            Impression:  1. Increase in elevated liver concentration.  2. Splenomegaly. Question portal hypertension.     WAYNE CURTIS MD    11/4/19  MRI Pelvis  Summary:  Marrow changes consistent with SCD but no convincing evidence of AVN at this time.     1/28/20 MRI/MRA  Comparison 5/8/15  Summary:  Stable appearance of the brain and cerebral vasculature compared to 5/8/2015, including remote left MCA teritory infarct. No evidence of new infarct or new abnormality on MRA    3/10/2020 Cardiac MRI      Review of systems:  A complete 14 point review of systems was completed. All were negative except for what was reported in the HPI or highlighted here.    --------------------------------  Plan last reviewed with patient: 3/27/2020    Patient background: 21yo F, enjoys movies and kids though there are times where she does not really want to talk to people. Does not have a lot of social support at home.     Sickle Cell Disease History  Primary Hematologist: Perla  Genotype: SS  Acute Pain Crisis Treatment:    ER/Acute Care/Infusion  Clinic:   o Morphine 1 mg IVP/SC Q1H X 3 doses  o Toradol  o Maintenance IV fluids with D5 half-normal saline  o Other: Benadryl PO and Zofran 8 mg IV PRN itching or nausea    Inpatient:  o Opioid: Morphine 1 mg IV Q1H PRN until PCA starts  o PCA plan: (Consistent with [facility] pain plan)  - PCA button dose: Morphine 0.7 mg  - Lockout: 10 minutes  - Continuous Infusion: consider 1 mg/hr  - One hr max: 4.2 mg  o [facility] often had success weaning to OxyCONTIN 10 mg q12h with Morphine 0.7 mg IV q10 min PRN rather than continuous infusion  o Other Medications: Benadryl, Zofran  o If PCA not working, she Has had success with ketamine starting at 4mg/h and advancing only to 6mg/h, as 8mg/h made her feel quite poorly.  o If giving scheduled toradol, hold ASA (ok to give celebrex if she prefers)  o Supportive Care: Docusate Senna  Chronic Pain Medications:    Home regimen as of 2/4/20 at [facility]: OxyCONTIN 10 mg po q 12 hrs and oxycodone 5-10 mg p.o. q.4-6 hours p.r.n. breakthrough pain.  She also continues on Celebrex, and Zoloft among other medications.    -Also benefits from mental health visits, acupuncture  Baseline Hemoglobin: 9.5 g/dL (on chronic transfusions)  Hydroxyurea use: Yes  H/O blood transfusions: Yes, several (iron overload)    H/O Transfusion Reactions: no    Antibodies:none  H/O Acute Chest Syndrome: Yes    Last episode: 10/2019     ICU/intubation: unsure  H/O Stroke: Yes (managed with chronic transfusions (has left her with neurodevelopmental deficits, low IQ)  H/O VTE: no  H/O Cholecystectomy or Splenectomy: no  H/O Asthma, Pulm HTN, AVN, Leg Ulcers, Nephropathy, Retinopathy, etc: Iron overload, asthma, chronic lung disease    EMERGENCY CARE PLAN  DO NOT TRANSFUSE WITHOUT DISCUSSING WITH HEMATOLOGY ATTENDING (available 24/7).       TRANSFUSION IS RISKY DUE TO RISK OF ALLOIMMUNIZATION AGAINST RBC ANTIGENS AND IRON OVERLOAD.  INDICATIONS FOR TRANSFUSION ARE ACUTE STROKE AND ACUTE CHEST  SYNDROME (hypoxia plus infiltrate, not chest pain).  DO NOT TRANSFUSE FOR ANEMIA      -------------------------------------------    Sickle Cell Disease Comprehensive Checklist    Bone Health/Avascular Necrosis Screening/Symptoms (each visit): none today    Leg Ulcer evaluation (every visit): none    Hypertension (every visit): none    Last ophthalmologic exam: within last year (timing unsure)    Last urinalysis for microalbuminuria/CKD (annually): within last year    Last pulmonary evaluation (asthma, AMAN, pulm HTN): within last year    Stroke/silent cerebral infarct Hx (Y/N): Yes    Last PCP Visit: 2/2020    Vaccines:  o PCV13: 5/13/19  o Pneumovax (PPSV23): 3/04, 10/09, 7/12/19 (next due 7/2024)  o Menactra: 4/2010, 9/2015 (MCV due Sept 2020)  o Trumemba:  o Influenza: got 19-20 vaccine    Past Medical History:  Past Medical History:   Diagnosis Date     Anemia      Anxiety      Bleeding disorder (H)      Blood clotting disorder (H)      Cerebral infarction (H) 2015     Cognitive developmental delay     low IQ. 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 (H)     right hand contractures     Iron overload due to repeated red blood cell transfusions      Migraines      Oppositional defiant behavior      Uncomplicated asthma        Past Surgical History:  Past Surgical History:   Procedure Laterality Date     AS INSERT TUNNELED CV 2 CATH W/O PORT/PUMP       C BREAST REDUCTION (INCLUDES LIPO) TIER 3 Bilateral 04/23/2019     REPAIR TENDON ELBOW Right 10/2/2019    Procedure: Right Elbow Flexor Lengthening, Flexor Pronator Slide Of Wrist and Finger, Thumb Adductor Lengthening;  Surgeon: Anai Franco MD;  Location: [facility] OR     TONSILLECTOMY Bilateral 10/2/2019    Procedure: Bilateral Tonsillectomy;  Surgeon: Farhana Guy MD;  Location: [facility] OR       Family History:  "  Family History   Problem Relation Age of Onset     Sickle Cell Trait Mother      Sickle Cell Trait Father        Social History:  Social History     Socioeconomic History     Marital status: Single     Spouse name: Not on file     Number of children: Not on file     Years of education: Not on file     Highest education level: Not on file   Occupational History     Not on file   Social Needs     Financial resource strain: Not on file     Food insecurity     Worry: Not on file     Inability: Not on file     Transportation needs     Medical: Not on file     Non-medical: Not on file   Tobacco Use     Smoking status: Never Smoker     Smokeless tobacco: Never Used   Substance and Sexual Activity     Alcohol use: Never     Alcohol/week: 0.0 standard drinks     Drug use: Never     Sexual activity: Not Currently     Partners: Male     Birth control/protection: Other   Lifestyle     Physical activity     Days per week: Not on file     Minutes per session: Not on file     Stress: Not on file   Relationships     Social connections     Talks on phone: Not on file     Gets together: Not on file     Attends Jew service: Not on file     Active member of club or organization: Not on file     Attends meetings of clubs or organizations: Not on file     Relationship status: Not on file     Intimate partner violence     Fear of current or ex partner: Not on file     Emotionally abused: Not on file     Physically abused: Not on file     Forced sexual activity: Not on file   Other Topics Concern     Not on file   Social History Narrative    Lives with mom and extended family but \"toxic environment\" per her report. She would like to move out but cannot financially do so. She has minimal support at home despite her significant SCD comorbidities and cognitive delay from stroke.       Medications:  Current Outpatient Medications   Medication     acetaminophen (TYLENOL) 325 MG tablet     aspirin (ASA) 81 MG tablet     " Budesonide-Formoterol Fumarate (SYMBICORT IN)     celecoxib (CELEBREX) 100 MG capsule     cyproheptadine (PERIACTIN) 4 MG tablet     deferasirox (JADENU) 180 MG tablet     diphenhydrAMINE (BENADRYL) 25 MG capsule     erythromycin ethylsuccinate (E.E.S.) 400 MG tablet     gabapentin (NEURONTIN) 300 MG capsule     GNP SENNA-LAX 8.6 MG tablet     hydroxyurea (HYDREA) 500 MG capsule CHEMO     JADENU 360 MG tablet     omeprazole (PRILOSEC) 40 MG DR capsule     ondansetron (ZOFRAN) 8 MG tablet     oxyCODONE IR (ROXICODONE) 10 MG tablet     OXYCONTIN 10 MG 12 hr tablet     pregabalin (LYRICA) 75 MG capsule     PROAIR  (90 Base) MCG/ACT inhaler     sertraline (ZOLOFT) 100 MG tablet     EPINEPHrine (EPIPEN) 0.3 MG/0.3ML injection     No current facility-administered medications for this visit.          Physical Exam:   No vitals or exam performed as this is a telephone visit.     Imaging: none today    Assessment:  Jennifer Cervantes is a 20 year old female with HbSS. Her disease is complicated by stroke leading to significant cognitive delay and right UE hemiparesis, high anxiety leading to frequent pain crises on top of chronic pain syndrome, poor social structure at home with no real support, and iron overload secondary to repeated transfusions. She seems to be doing quite well today based on the phone visit.    Plan:  1) HbSS and complications   -- Pain plan reviewed               -- She has done pretty well with her pain and stayed out of the hospital for >1 month which is great news!   --Currently on q4-6 week transfusions. Had CVA ~2 yo and another TIA ~2014 during a trial off transfusions and just HU. We may need to consider this again given her significant iron overload but she will take time to become comfortable with this. Next Transfusion (+/- phlebotomy depending on what our nurses decide on access) planned for 4/10.   --Needs aggressive chelation. Will consider increasing her Jadenu in coming weeks/months.  Also need to have Ferriscan when COVID restrictions zaid.   --Consider neuropsych testing given her delay history   --Continue ASA post-stroke  2) Labs: none today. CBC, CMP, ferritin at next visit  3) Medication Changes: Oxycodone refilled (56 tabs, q6h dosing PRN). Also refilled oxycontin 60 tabs x 1 month (10 mg BID). Filled albuterol nebulizer as she tends to use this more than HFA and has been using it with allergies.  4)  Other orders/recommendations: None  5)  HCM: PCP is Daya Carter. Need to get SW closely involved  6)  Psych: She remains on the 150 mg Zoloft as recommended last week. Agree with titration plan as described.  7)  Follow up plan: Due to COVID-19 concerns, we will likely need to plan for limiting to every visits only for transfusions (q4-6 weeks depending on success with this current 6 week window). Phone visits weekly alternating with Andrei Machado or myself. She is at high risk for LTFU and complications from a horrendous social situation and developmental delay so her access to care is essential to be in person. May benefit from crizanlizumab  8) Next visit: 2 weeks for transfusion    It was a pleasure talking Jennifer today and in continuing to guide her care and transition to adult care moving forward.    I spent a total of 13 minutes on the phone with Jennifer during today's office visit. See note for details.    Telephone contact  Phone call start: 12:58 PM  Phone call end: 1:11 PM      Eric Duncan MD  Hematologist  Division of Hematology, Oncology, and Transplantation  [facility]  [facility]  Pager: [phone]

## 2020-03-28 ENCOUNTER — APPOINTMENT (OUTPATIENT)
Dept: GENERAL RADIOLOGY | Facility: CLINIC | Age: 20
DRG: 812 | End: 2020-03-28
Attending: EMERGENCY MEDICINE
Payer: COMMERCIAL

## 2020-03-28 PROBLEM — D57.00 SICKLE CELL CRISIS (H): Status: ACTIVE | Noted: 2020-03-28

## 2020-03-28 LAB
BASOPHILS # BLD AUTO: 0.2 10E9/L (ref 0–0.2)
BASOPHILS NFR BLD AUTO: 0.9 %
BILIRUB DIRECT SERPL-MCNC: 0.3 MG/DL (ref 0–0.2)
BILIRUB SERPL-MCNC: 3.8 MG/DL (ref 0.2–1.3)
DIFFERENTIAL METHOD BLD: ABNORMAL
EOSINOPHIL # BLD AUTO: 0.2 10E9/L (ref 0–0.7)
EOSINOPHIL NFR BLD AUTO: 1.3 %
ERYTHROCYTE [DISTWIDTH] IN BLOOD BY AUTOMATED COUNT: 20.3 % (ref 10–15)
HCT VFR BLD AUTO: 23.5 % (ref 35–47)
HGB BLD-MCNC: 8 G/DL (ref 11.7–15.7)
IMM GRANULOCYTES # BLD: 0.2 10E9/L (ref 0–0.4)
IMM GRANULOCYTES NFR BLD: 0.9 %
LYMPHOCYTES # BLD AUTO: 3.1 10E9/L (ref 0.8–5.3)
LYMPHOCYTES NFR BLD AUTO: 17.3 %
MCH RBC QN AUTO: 31.6 PG (ref 26.5–33)
MCHC RBC AUTO-ENTMCNC: 34 G/DL (ref 31.5–36.5)
MCV RBC AUTO: 93 FL (ref 78–100)
MONOCYTES # BLD AUTO: 1.3 10E9/L (ref 0–1.3)
MONOCYTES NFR BLD AUTO: 7 %
NEUTROPHILS # BLD AUTO: 13.2 10E9/L (ref 1.6–8.3)
NEUTROPHILS NFR BLD AUTO: 72.6 %
NRBC # BLD AUTO: 0.4 10*3/UL
NRBC BLD AUTO-RTO: 2 /100
PLATELET # BLD AUTO: 238 10E9/L (ref 150–450)
RBC # BLD AUTO: 2.53 10E12/L (ref 3.8–5.2)
RETICS # AUTO: ABNORMAL 10E9/L (ref 25–95)
RETICS/RBC NFR AUTO: 23.9 % (ref 0.5–2)
SARS-COV-2 RNA SPEC QL NAA+PROBE: NOT DETECTED
SPECIMEN SOURCE: NORMAL
WBC # BLD AUTO: 18.2 10E9/L (ref 4–11)

## 2020-03-28 PROCEDURE — 99223 1ST HOSP IP/OBS HIGH 75: CPT | Mod: AI | Performed by: ORTHOPAEDIC SURGERY

## 2020-03-28 PROCEDURE — 12000001 ZZH R&B MED SURG/OB UMMC

## 2020-03-28 PROCEDURE — 99222 1ST HOSP IP/OBS MODERATE 55: CPT | Mod: GC | Performed by: INTERNAL MEDICINE

## 2020-03-28 PROCEDURE — 93005 ELECTROCARDIOGRAM TRACING: CPT

## 2020-03-28 PROCEDURE — 87635 SARS-COV-2 COVID-19 AMP PRB: CPT | Performed by: EMERGENCY MEDICINE

## 2020-03-28 PROCEDURE — 25000128 H RX IP 250 OP 636: Performed by: STUDENT IN AN ORGANIZED HEALTH CARE EDUCATION/TRAINING PROGRAM

## 2020-03-28 PROCEDURE — 25000128 H RX IP 250 OP 636: Performed by: EMERGENCY MEDICINE

## 2020-03-28 PROCEDURE — 93010 ELECTROCARDIOGRAM REPORT: CPT | Performed by: INTERNAL MEDICINE

## 2020-03-28 PROCEDURE — 82247 BILIRUBIN TOTAL: CPT | Performed by: INTERNAL MEDICINE

## 2020-03-28 PROCEDURE — 71045 X-RAY EXAM CHEST 1 VIEW: CPT

## 2020-03-28 PROCEDURE — 96376 TX/PRO/DX INJ SAME DRUG ADON: CPT | Performed by: EMERGENCY MEDICINE

## 2020-03-28 PROCEDURE — 25000132 ZZH RX MED GY IP 250 OP 250 PS 637: Performed by: ORTHOPAEDIC SURGERY

## 2020-03-28 PROCEDURE — 36415 COLL VENOUS BLD VENIPUNCTURE: CPT | Performed by: INTERNAL MEDICINE

## 2020-03-28 PROCEDURE — 82248 BILIRUBIN DIRECT: CPT | Performed by: INTERNAL MEDICINE

## 2020-03-28 PROCEDURE — 25000132 ZZH RX MED GY IP 250 OP 250 PS 637: Performed by: STUDENT IN AN ORGANIZED HEALTH CARE EDUCATION/TRAINING PROGRAM

## 2020-03-28 RX ORDER — ASPIRIN 81 MG/1
81 TABLET, CHEWABLE ORAL DAILY
Status: DISCONTINUED | OUTPATIENT
Start: 2020-03-28 | End: 2020-03-29 | Stop reason: HOSPADM

## 2020-03-28 RX ORDER — ONDANSETRON 4 MG/1
4 TABLET, FILM COATED ORAL EVERY 6 HOURS PRN
Status: DISCONTINUED | OUTPATIENT
Start: 2020-03-28 | End: 2020-03-29 | Stop reason: HOSPADM

## 2020-03-28 RX ORDER — POLYETHYLENE GLYCOL 3350 17 G/17G
17 POWDER, FOR SOLUTION ORAL DAILY
Status: DISCONTINUED | OUTPATIENT
Start: 2020-03-28 | End: 2020-03-29 | Stop reason: HOSPADM

## 2020-03-28 RX ORDER — DEFERASIROX 180 MG/1
180 TABLET, FILM COATED ORAL DAILY
Status: DISCONTINUED | OUTPATIENT
Start: 2020-03-28 | End: 2020-03-29 | Stop reason: HOSPADM

## 2020-03-28 RX ORDER — AMOXICILLIN 250 MG
1 CAPSULE ORAL AT BEDTIME
Status: DISCONTINUED | OUTPATIENT
Start: 2020-03-28 | End: 2020-03-29 | Stop reason: HOSPADM

## 2020-03-28 RX ORDER — OXYCODONE HCL 10 MG/1
10 TABLET, FILM COATED, EXTENDED RELEASE ORAL EVERY 12 HOURS
Status: DISCONTINUED | OUTPATIENT
Start: 2020-03-28 | End: 2020-03-29 | Stop reason: HOSPADM

## 2020-03-28 RX ORDER — HYDROXYUREA 500 MG/1
2000 CAPSULE ORAL DAILY
Status: DISCONTINUED | OUTPATIENT
Start: 2020-03-28 | End: 2020-03-28

## 2020-03-28 RX ORDER — NALOXONE HYDROCHLORIDE 0.4 MG/ML
.1-.4 INJECTION, SOLUTION INTRAMUSCULAR; INTRAVENOUS; SUBCUTANEOUS
Status: DISCONTINUED | OUTPATIENT
Start: 2020-03-28 | End: 2020-03-29

## 2020-03-28 RX ORDER — LIDOCAINE 40 MG/G
CREAM TOPICAL
Status: DISCONTINUED | OUTPATIENT
Start: 2020-03-28 | End: 2020-03-29 | Stop reason: HOSPADM

## 2020-03-28 RX ORDER — ALBUTEROL SULFATE 90 UG/1
2 AEROSOL, METERED RESPIRATORY (INHALATION) EVERY 4 HOURS PRN
Status: DISCONTINUED | OUTPATIENT
Start: 2020-03-28 | End: 2020-03-29

## 2020-03-28 RX ORDER — LANOLIN ALCOHOL/MO/W.PET/CERES
3 CREAM (GRAM) TOPICAL
Status: DISCONTINUED | OUTPATIENT
Start: 2020-03-28 | End: 2020-03-29 | Stop reason: HOSPADM

## 2020-03-28 RX ORDER — ACETAMINOPHEN 325 MG/1
975 TABLET ORAL EVERY 8 HOURS
Status: DISCONTINUED | OUTPATIENT
Start: 2020-03-28 | End: 2020-03-29 | Stop reason: HOSPADM

## 2020-03-28 RX ORDER — DIPHENHYDRAMINE HCL 25 MG
25-50 CAPSULE ORAL
Status: DISCONTINUED | OUTPATIENT
Start: 2020-03-28 | End: 2020-03-29 | Stop reason: HOSPADM

## 2020-03-28 RX ORDER — MORPHINE SULFATE 2 MG/ML
2 INJECTION, SOLUTION INTRAMUSCULAR; INTRAVENOUS ONCE
Status: COMPLETED | OUTPATIENT
Start: 2020-03-28 | End: 2020-03-28

## 2020-03-28 RX ORDER — ALUMINA, MAGNESIA, AND SIMETHICONE 2400; 2400; 240 MG/30ML; MG/30ML; MG/30ML
30 SUSPENSION ORAL EVERY 6 HOURS PRN
Status: DISCONTINUED | OUTPATIENT
Start: 2020-03-28 | End: 2020-03-29 | Stop reason: HOSPADM

## 2020-03-28 RX ORDER — NALOXONE HYDROCHLORIDE 0.4 MG/ML
.1-.4 INJECTION, SOLUTION INTRAMUSCULAR; INTRAVENOUS; SUBCUTANEOUS
Status: DISCONTINUED | OUTPATIENT
Start: 2020-03-28 | End: 2020-03-29 | Stop reason: HOSPADM

## 2020-03-28 RX ORDER — HYDROXYUREA 500 MG/1
2000 CAPSULE ORAL DAILY
Status: DISCONTINUED | OUTPATIENT
Start: 2020-03-28 | End: 2020-03-29 | Stop reason: HOSPADM

## 2020-03-28 RX ORDER — ACETAMINOPHEN 325 MG/1
650 TABLET ORAL EVERY 6 HOURS PRN
Status: DISCONTINUED | OUTPATIENT
Start: 2020-03-28 | End: 2020-03-29 | Stop reason: HOSPADM

## 2020-03-28 RX ORDER — ALBUTEROL SULFATE 0.83 MG/ML
2.5 SOLUTION RESPIRATORY (INHALATION) EVERY 6 HOURS PRN
Status: DISCONTINUED | OUTPATIENT
Start: 2020-03-28 | End: 2020-03-28

## 2020-03-28 RX ADMIN — SERTRALINE HYDROCHLORIDE 150 MG: 50 TABLET ORAL at 08:03

## 2020-03-28 RX ADMIN — ACETAMINOPHEN 975 MG: 325 TABLET, FILM COATED ORAL at 19:09

## 2020-03-28 RX ADMIN — ASPIRIN 81 MG CHEWABLE TABLET 81 MG: 81 TABLET CHEWABLE at 08:04

## 2020-03-28 RX ADMIN — MORPHINE SULFATE 2 MG: 2 INJECTION, SOLUTION INTRAMUSCULAR; INTRAVENOUS at 00:03

## 2020-03-28 RX ADMIN — OXYCODONE HYDROCHLORIDE 10 MG: 10 TABLET, FILM COATED, EXTENDED RELEASE ORAL at 12:28

## 2020-03-28 RX ADMIN — SENNOSIDES AND DOCUSATE SODIUM 1 TABLET: 8.6; 5 TABLET ORAL at 19:09

## 2020-03-28 RX ADMIN — OXYCODONE HYDROCHLORIDE 10 MG: 10 TABLET, FILM COATED, EXTENDED RELEASE ORAL at 23:46

## 2020-03-28 RX ADMIN — ACETAMINOPHEN 975 MG: 325 TABLET, FILM COATED ORAL at 04:48

## 2020-03-28 RX ADMIN — FLUTICASONE FUROATE AND VILANTEROL TRIFENATATE 1 PUFF: 100; 25 POWDER RESPIRATORY (INHALATION) at 08:03

## 2020-03-28 RX ADMIN — SENNOSIDES AND DOCUSATE SODIUM 1 TABLET: 8.6; 5 TABLET ORAL at 04:49

## 2020-03-28 RX ADMIN — MORPHINE SULFATE 2 MG: 2 INJECTION, SOLUTION INTRAMUSCULAR; INTRAVENOUS at 01:27

## 2020-03-28 RX ADMIN — OMEPRAZOLE 40 MG: 20 CAPSULE, DELAYED RELEASE ORAL at 08:03

## 2020-03-28 RX ADMIN — HYDROXYUREA 2000 MG: 500 CAPSULE ORAL at 14:24

## 2020-03-28 RX ADMIN — ACETAMINOPHEN 975 MG: 325 TABLET, FILM COATED ORAL at 12:30

## 2020-03-28 RX ADMIN — Medication: at 04:37

## 2020-03-28 RX ADMIN — ONDANSETRON HYDROCHLORIDE 4 MG: 4 TABLET, FILM COATED ORAL at 12:28

## 2020-03-28 ASSESSMENT — ACTIVITIES OF DAILY LIVING (ADL)
ADLS_ACUITY_SCORE: 12
AMBULATION: 0-->INDEPENDENT
TRANSFERRING: 0-->INDEPENDENT
BATHING: 0-->INDEPENDENT
TOILETING: 0-->INDEPENDENT
RETIRED_COMMUNICATION: 0-->UNDERSTANDS/COMMUNICATES WITHOUT DIFFICULTY
COGNITION: 0 - NO COGNITION ISSUES REPORTED
DRESS: 0-->INDEPENDENT
ADLS_ACUITY_SCORE: 12
SWALLOWING: 0-->SWALLOWS FOODS/LIQUIDS WITHOUT DIFFICULTY
ADLS_ACUITY_SCORE: 12
ADLS_ACUITY_SCORE: 12
FALL_HISTORY_WITHIN_LAST_SIX_MONTHS: NO
RETIRED_EATING: 0-->INDEPENDENT
ADLS_ACUITY_SCORE: 12

## 2020-03-28 ASSESSMENT — MIFFLIN-ST. JEOR: SCORE: 1446.27

## 2020-03-28 NOTE — CONSULTS
HEMATOLOGY CONSULT NOTE    Subjective     REFERRAL SOURCE  [facility] Medicine    CONSULT INDICATION:  Sickle cell pain crisis    HISTORY OF PRESENT ILLNESS:  Ms. 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 Patient follows with Dr. Duncan and just had a telephone visit with him yesterday. She is in the process of transitioning from pediatric to adult care. Her home pain regimen consists of Oxycontin 10 mg q12h and PRN oxycodone 5-10 mg q4-6 hrs. Per televisit yesterday, she was doing quite well.     However, she states that about an hour after talking with Dr. Duncan on the phone, she developed severe pain (mostly in the back, but along extremities) which did not respond to her home oxycodone. Therefore she called in and was advised to go to the ED. Here, she has been HDS on RA. Labs were notable for WBC 18.2, Hgb 8.0 (baseline 8-9), retic 23.9% (unable to calculate absolute retic), and tbili 3.8. EKG and CXR were unremarkable. She was started on morphine PCA with 0.7 mg bolus q10min PRN and no continuous rate.     This morning, she is feeling better overall but still endorses pain in the back. Has not been eating/drinking that much. Otherwise no fever/chills, dizziness, chest pain, SOB, N/V, abd pain, diarrhea, dysuria, or bleeding. Has had increased dry cough and sneezing over last few days which she attributes to her asthma (often flares in the spring). COVID rule out was sent overnight and is pending.       Past Medical History:   Diagnosis Date     Anemia      Anxiety      Bleeding disorder (H)      Blood clotting disorder (H)      Cerebral infarction (H) 2015     Cognitive developmental delay     low IQ. Please recognize when managing  "pain and planning with her     Depressive disorder      Hemiplegia and hemiparesis following cerebral infarction affecting right dominant side (H)     right hand contractures     Iron overload due to repeated red blood cell transfusions      Migraines      Oppositional defiant behavior      Uncomplicated asthma         Family History   Problem Relation Age of Onset     Sickle Cell Trait Mother      Sickle Cell Trait Father         Social History     Socioeconomic History     Marital status: Single     Spouse name: Not on file     Number of children: Not on file     Years of education: Not on file     Highest education level: Not on file   Occupational History     Not on file   Social Needs     Financial resource strain: Not on file     Food insecurity     Worry: Not on file     Inability: Not on file     Transportation needs     Medical: Not on file     Non-medical: Not on file   Tobacco Use     Smoking status: Never Smoker     Smokeless tobacco: Never Used   Substance and Sexual Activity     Alcohol use: Not Currently     Alcohol/week: 0.0 standard drinks     Drug use: Never     Sexual activity: Not Currently     Partners: Male     Birth control/protection: Other   Lifestyle     Physical activity     Days per week: Not on file     Minutes per session: Not on file     Stress: Not on file   Relationships     Social connections     Talks on phone: Not on file     Gets together: Not on file     Attends Sikh service: Not on file     Active member of club or organization: Not on file     Attends meetings of clubs or organizations: Not on file     Relationship status: Not on file     Intimate partner violence     Fear of current or ex partner: Not on file     Emotionally abused: Not on file     Physically abused: Not on file     Forced sexual activity: Not on file   Other Topics Concern     Not on file   Social History Narrative    Lives with mom and extended family but \"toxic environment\" per her report. She would " like to move out but cannot financially do so. She has minimal support at home despite her significant SCD comorbidities and cognitive delay from stroke.           Allergies   Allergen Reactions     Fish-Derived Products Hives     Seafood Hives     Contrast Dye      Diagnostic X-Ray Materials      Gadolinium      Medications Prior to Admission   Medication Sig Dispense Refill Last Dose     acetaminophen (TYLENOL) 325 MG tablet Take 2 tablets (650 mg) by mouth every 6 hours as needed for mild pain        albuterol (PROVENTIL) (2.5 MG/3ML) 0.083% neb solution Take 1 vial (2.5 mg) by nebulization every 6 hours as needed for shortness of breath / dyspnea or wheezing 12 mL 4      aspirin (ASA) 81 MG tablet Take 1 tablet (81 mg) by mouth daily        Budesonide-Formoterol Fumarate (SYMBICORT IN) Inhale  into the lungs.        celecoxib (CELEBREX) 100 MG capsule Take 100 mg by mouth        cyproheptadine (PERIACTIN) 4 MG tablet         deferasirox (JADENU) 180 MG tablet Take 1 tablet (180 mg) by mouth daily With two 360mg tabs for a total daily dose of 900mg        diphenhydrAMINE (BENADRYL) 25 MG capsule Take 1-2 capsules (25-50 mg) by mouth nightly as needed for sleep 60 capsule 3      EPINEPHrine (EPIPEN) 0.3 MG/0.3ML injection         erythromycin ethylsuccinate (E.E.S.) 400 MG tablet Take 1 tablet (400 mg) by mouth 3 times daily Before meals        gabapentin (NEURONTIN) 300 MG capsule Take 3 capsules (900 mg) by mouth 3 times daily        GNP SENNA-LAX 8.6 MG tablet Take 1 tablet by mouth 2 times daily as needed for constipation        hydroxyurea (HYDREA) 500 MG capsule CHEMO Take 4 capsules (2,000 mg) by mouth daily        JADENU 360 MG tablet Take 2 tablets (720 mg) by mouth daily With one 180mg tab for a total daily dose of 900mg        omeprazole (PRILOSEC) 40 MG DR capsule Take 1 capsule (40 mg) by mouth daily        ondansetron (ZOFRAN) 8 MG tablet         oxyCODONE IR (ROXICODONE) 10 MG tablet Take 1 tablet  "by mouth every 4-6 hours as needed for severe pain. 56 tablet 0      OXYCONTIN 10 MG 12 hr tablet Take 1 tablet (10 mg) by mouth every 12 hours 60 tablet 0      pregabalin (LYRICA) 75 MG capsule         PROAIR  (90 Base) MCG/ACT inhaler Inhale 2 puffs into the lungs every 4 hours as needed for shortness of breath / dyspnea or wheezing        sertraline (ZOLOFT) 100 MG tablet Take 1.5 tablets (150 mg) by mouth daily 30 tablet 0        REVIEW OF SYSTEMS:  Reviewed and negative other than that mentioned in HPI.     Objective     VITAL SIGNS:  /73   Pulse 98   Temp 97.3  F (36.3  C) (Oral)   Resp 16   Ht 1.626 m (5' 4\")   Wt 69.6 kg (153 lb 8 oz)   LMP  (LMP Unknown)   SpO2 100%   BMI 26.35 kg/m       PHYSICAL EXAM:  Vitals reviewed.  General: Appears mildly uncomfortable but no acute distress.   ENT: Normocephalic, atraumatic.   CV: Regular rate and rhythm. No murmurs, rubs, or gallops appreciated.  Resp: Good inspiratory effort, lungs clear to auscultation bilaterally.  Abd: Soft, nontender, nondistended.    Ext: No peripheral edema, cyanosis, or clubbing. Peripheral pulses intact.   Neuro: CN II-XII grossly intact. Moves all extremities spontaneously.   Skin: No rash, unusual bruising or prominent lesions.    LABS:  Lab Results   Component Value Date    WBC 18.2 (H) 03/27/2020    HGB 8.0 (L) 03/27/2020    HCT 23.5 (L) 03/27/2020    MCV 93 03/27/2020     03/27/2020     Lab Results   Component Value Date     03/27/2020    BUN 8 03/27/2020    ANIONGAP 8 03/27/2020     Lab Results   Component Value Date    ALT 54 (H) 03/13/2020    AST 49 (H) 03/13/2020    ALKPHOS 78 03/13/2020       IMAGING:  3/28/20 CXR:  IMPRESSION: Lungs clear. Previous right upper lung infiltrate resolved. Normal heart size. Left chest wall port with catheter tip in the SVC.    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 Now presenting with sickle cell pain crisis; appears uncomplicated with no evidence of acute chest. Unclear what precipitating factor was although she is being ruled out for COVID-19 due to recent cough/sneezing (may be related to asthma). Would continue morphine PCA but also restart her home Oxycontin. May be able to discharge in next 1-2 days.     Recommendations:  1. Would restart home Oxycontin 10 mg q12h for long-acting pain control.  2. Continue morphine PCA with bolus dosing only (no basal when on Oxycontin); wean per primary team.   3. Consider UA w/ reflex to rule out UTI as precipitating factor for her pain crisis.     Thank you for this consult. This patient was discussed with Dr. Thornton.    Kelsey Scott MD   Hematology-Oncology Fellow, PGY-4    Attending Note:  I have reviewed the patient chart, and interviewed and examined the patient.  I agree with the assessment and plan. Uncomplicated pain crisis. Often helpful to keep giving the patient's long acting opioid, especially since she is not receiving a basal rate of opioid. This will help prevent waking up I the middle of night with severe pain, and can help with weaning back to oral meds.     Nighat Thornton MD  Hematology

## 2020-03-28 NOTE — TELEPHONE ENCOUNTER
I was notified patient had severe pain despite getting oxycotin and oxycodone. She called earlier today and and virtual encoutner with Dr Duncan with pain medication refill. She reports she was taking oxycontin and oxycodone without any relief of her pain. She sounds stressed out and very uncomfortable.     I explained given her pain could not be relieved by oxycontin and oxycodone, she should go to ED to get evaluated and receive IV pain medication instead.     She expressed understand and indicated she would go to [facility].     Sharon Ferrara  Hem/Onco Fellow

## 2020-03-28 NOTE — TELEPHONE ENCOUNTER
Caller reports onset of a SCC  within one  Hour after having telephone visit  with  Hematologist   Caller has  Taken medication as prescribed but is not getting adequate relief    Took oxycodone IR  20 minutes prior to call and six hours before that   Took oxycontin  @ 8 pm    Caller reports pain all over but not chest pain ; caller does not have a themometer    Caller reports she has tried all  Home care per protocol    On call provider Dr. Ferrara paged via   @ 9:23 PM to call patient  At home [phone]      Marleny Boyer RN  FNA    Reason for Disposition    [1] MODERATE sickle cell pain (sickle cell crisis) AND [2] has pain management plan AND [3] NOT better after taking pain medicine per plan    Additional Information    Negative: SEVERE difficulty breathing (e.g., struggling for each breath, retractions, cyanosis, speaks in single words, pulse > 120)    Negative: Shock suspected (e.g., cold/pale/clammy skin, too weak to stand, low BP, rapid pulse)    Negative: [1] Chest pain lasts > 5 minutes AND [2] history of heart disease  (i.e., heart attack, bypass surgery, angina, angioplasty, CHF; not just a heart murmur)    Negative: [1] Chest pain that lasts > 5 minutes AND [2] described as crushing, pressure-like, or heavy    Negative: Sounds like a life-threatening emergency to the triager    Negative: Pain that follows an injury    Negative: Pain is not typical of patient's previous SCD acute pain attacks    Negative: [1] SEVERE abdominal pain AND [2] present > 1 hour    Negative: MODERATE difficulty breathing (e.g., speaks in phrases, SOB even at rest, pulse 100-120)    Negative: Chest pain > 5 minutes    Negative: [1] Chest pain AND [2] fever > 100.5 F (38.1 C)    Negative: Painful erection lasting longer than 4 hours    Negative: [1] SEVERE sickle cell pain (sickle cell crisis) AND [2] has pain management plan AND [3] NOT better after taking pain medicine per plan    Negative: [1] SEVERE sickle cell  pain AND [2] does NOT have a pain management plan AND [3] NOT better after taking OTC acetaminophen (Tylenol) or ibuprofen (Motrin)    Negative: [1] Yellow eyes AND [2] new onset    Negative: [1] Constant abdominal pain AND [2] present > 2 hours    Negative: Painful joint is swollen    Negative: Painful joint is red    Negative: Fever > 100.5 F (38.1 C)    Negative: [1] Caller has URGENT question AND [2] triager unable to answer question    Protocols used: SICKLE CELL DISEASE - ACUTE PAIN EPISODE (CRISIS)-[facility]

## 2020-03-28 NOTE — PLAN OF CARE
Pt admitted from [facility] cough and pain crisis. A&Ox4. VSS on RA, ex intermittent tachycardia. Pt c/o generalized pain. PCA morphine pump in use. Capno and CPOX in use. Port infusing D5 45NS at 100mL/hr and PCA pump. Covid19 test sent 3/28/2020 0250. Scarring on R elbow, under bilateral breasts, lap sites on abdomen. 12lead EKG completed, NSR. Plan to continue pain regimen. Will continue to monitor.

## 2020-03-28 NOTE — ED TRIAGE NOTES
COVID screening: Passed  Patient presents with sickle cell pain. C/O generalized pain and pain in back, arms and legs. Patient states it started getting worse this morning and she took her protocol meds with no relief.

## 2020-03-28 NOTE — ED PROVIDER NOTES
"ED Provider Note  [facility]      History     Chief Complaint   Patient presents with     Sickle Cell Pain Crisis     The history is provided by the patient and medical records.     Jennifer Cervantes is a 20 year old female with a medical history significant for sickle cell anemia, stroke leading to significant cognitive delay, right UE hemiparesis, depression, and high anxiety leading to frequent sickle cell pain crises who presents to the Emergency Department for evaluation of sickle cell pain crisis.    Per chart review, patient [facility] for recurrent sickle cell pain crises and was recently transferred to the adult unit here at [facility]. She was most recently seen by oncology on 3/13/20 for a regular check-up and virtual visit earlier today for medication refill.    Patient reports striking pain \"a little bit everywhere\" including back, ribs, arms, and legs, which she states is normal during flare ups. She notes taking her prescribed medications, Oxycontin last taken at 8pm and oxycodone at 8:50pm, to control pain but with temporary relief.  She continues to have pain even with her normal home meds per her regimen. she denies chest pain, shortness of breath, cough, or fever. She denies nausea, vomiting, or diarrhea. She denies new neuro symptoms.     Past Medical History  Past Medical History:   Diagnosis Date     Anemia      Anxiety      Bleeding disorder (H)      Blood clotting disorder (H)      Cerebral infarction (H) 2015     Cognitive developmental delay     low IQ. 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 (H)     right hand contractures     Iron overload due to repeated red blood cell transfusions      Migraines      Oppositional defiant behavior      Uncomplicated asthma      Past Surgical " History:   Procedure Laterality Date     AS INSERT TUNNELED CV 2 CATH W/O PORT/PUMP       C BREAST REDUCTION (INCLUDES LIPO) TIER 3 Bilateral 04/23/2019     REPAIR TENDON ELBOW Right 10/2/2019    Procedure: Right Elbow Flexor Lengthening, Flexor Pronator Slide Of Wrist and Finger, Thumb Adductor Lengthening;  Surgeon: Anai Franco MD;  Location: [facility] OR     TONSILLECTOMY Bilateral 10/2/2019    Procedure: Bilateral Tonsillectomy;  Surgeon: Farhana Guy MD;  Location: [facility] OR     acetaminophen (TYLENOL) 325 MG tablet  albuterol (PROVENTIL) (2.5 MG/3ML) 0.083% neb solution  aspirin (ASA) 81 MG tablet  Budesonide-Formoterol Fumarate (SYMBICORT IN)  celecoxib (CELEBREX) 100 MG capsule  cyproheptadine (PERIACTIN) 4 MG tablet  deferasirox (JADENU) 180 MG tablet  diphenhydrAMINE (BENADRYL) 25 MG capsule  EPINEPHrine (EPIPEN) 0.3 MG/0.3ML injection  erythromycin ethylsuccinate (E.E.S.) 400 MG tablet  gabapentin (NEURONTIN) 300 MG capsule  GNP SENNA-LAX 8.6 MG tablet  hydroxyurea (HYDREA) 500 MG capsule CHEMO  JADENU 360 MG tablet  omeprazole (PRILOSEC) 40 MG DR capsule  ondansetron (ZOFRAN) 8 MG tablet  oxyCODONE IR (ROXICODONE) 10 MG tablet  OXYCONTIN 10 MG 12 hr tablet  pregabalin (LYRICA) 75 MG capsule  PROAIR  (90 Base) MCG/ACT inhaler  sertraline (ZOLOFT) 100 MG tablet      Allergies   Allergen Reactions     Fish-Derived Products Hives     Seafood Hives     Contrast Dye      Diagnostic X-Ray Materials      Gadolinium      Past medical history, past surgical history, medications, and allergies were reviewed with the patient. Additional pertinent items: None    Family History  Family History   Problem Relation Age of Onset     Sickle Cell Trait Mother      Sickle Cell Trait Father      Family history was reviewed with the patient. Additional pertinent items: None    Social History  Social History     Tobacco Use     Smoking status: Never Smoker     Smokeless tobacco: Never Used   Substance Use  "Topics     Alcohol use: Not Currently     Alcohol/week: 0.0 standard drinks     Drug use: Never      Social history was reviewed with the patient. Additional pertinent items: None    Review of Systems   Constitutional: Negative for fever.   Respiratory: Negative for cough and shortness of breath.    Cardiovascular: Negative for chest pain.   Gastrointestinal: Negative for diarrhea, nausea and vomiting.   Musculoskeletal:        Positive for striking pain located on back, ribs, arms, and legs     All other systems reviewed and are negative.    A complete review of systems was performed with pertinent positives and negatives noted in the HPI, and all other systems negative.    Physical Exam   BP: 125/77  Pulse: 104  Temp: 98.1  F (36.7  C)  Resp: 15  Height: 162.6 cm (5' 4\")  Weight: 68 kg (150 lb)  SpO2: 96 %  Physical Exam  Constitutional:       General: She is not in acute distress.     Appearance: Normal appearance.   HENT:      Head: Normocephalic and atraumatic.      Mouth/Throat:      Mouth: Mucous membranes are moist.   Eyes:      Extraocular Movements: Extraocular movements intact.   Neck:      Musculoskeletal: Normal range of motion.   Cardiovascular:      Rate and Rhythm: Regular rhythm. Tachycardia present.   Pulmonary:      Effort: Pulmonary effort is normal.      Breath sounds: Normal breath sounds. No wheezing, rhonchi or rales.   Abdominal:      General: Abdomen is flat.      Palpations: Abdomen is soft.      Tenderness: There is no abdominal tenderness.   Musculoskeletal:      Comments: Chronic contracture of the right upper extremity wrist   Neurological:      Mental Status: She is alert and oriented to person, place, and time.         ED Course     10:26 PM  The patient was seen and examined by Artie Taylor MD in Room ED18.     Procedures           Results for orders placed or performed during the hospital encounter of 03/27/20   CBC with platelets differential     Status: Abnormal   Result Value " Ref Range    WBC 18.2 (H) 4.0 - 11.0 10e9/L    RBC Count 2.53 (L) 3.8 - 5.2 10e12/L    Hemoglobin 8.0 (L) 11.7 - 15.7 g/dL    Hematocrit 23.5 (L) 35.0 - 47.0 %    MCV 93 78 - 100 fl    MCH 31.6 26.5 - 33.0 pg    MCHC 34.0 31.5 - 36.5 g/dL    RDW 20.3 (H) 10.0 - 15.0 %    Platelet Count 238 150 - 450 10e9/L    Diff Method Automated Method     % Neutrophils 72.6 %    % Lymphocytes 17.3 %    % Monocytes 7.0 %    % Eosinophils 1.3 %    % Basophils 0.9 %    % Immature Granulocytes 0.9 %    Nucleated RBCs 2 (H) 0 /100    Absolute Neutrophil 13.2 (H) 1.6 - 8.3 10e9/L    Absolute Lymphocytes 3.1 0.8 - 5.3 10e9/L    Absolute Monocytes 1.3 0.0 - 1.3 10e9/L    Absolute Eosinophils 0.2 0.0 - 0.7 10e9/L    Absolute Basophils 0.2 0.0 - 0.2 10e9/L    Abs Immature Granulocytes 0.2 0 - 0.4 10e9/L    Absolute Nucleated RBC 0.4    Basic metabolic panel     Status: None   Result Value Ref Range    Sodium 136 133 - 144 mmol/L    Potassium 3.7 3.4 - 5.3 mmol/L    Chloride 105 94 - 109 mmol/L    Carbon Dioxide 22 20 - 32 mmol/L    Anion Gap 8 3 - 14 mmol/L    Glucose 79 70 - 99 mg/dL    Urea Nitrogen 8 7 - 30 mg/dL    Creatinine 0.56 0.52 - 1.04 mg/dL    GFR Estimate >90 >60 mL/min/[1.73_m2]    GFR Estimate If Black >90 >60 mL/min/[1.73_m2]    Calcium 8.9 8.5 - 10.1 mg/dL   Reticulocyte count     Status: Abnormal   Result Value Ref Range    % Retic 23.9 (H) 0.5 - 2.0 %    Absolute Retic Unable to calculate, no RBC ordered 25 - 95 10e9/L     Medications   dextrose 5% and 0.45% NaCl infusion ( Intravenous New Bag 3/27/20 1642)   morphine (PF) injection 1 mg (1 mg Intravenous Given 3/27/20 2301)   ketorolac (TORADOL) injection 30 mg (30 mg Intravenous Given 3/27/20 2300)   morphine (PF) injection 2 mg (2 mg Intravenous Given 3/28/20 0003)   morphine (PF) injection 2 mg (2 mg Intravenous Given 3/28/20 0127)        Assessments & Plan (with Medical Decision Making)   Patient presents for uncontrolled sickle cell pain.  She took her normal pain  medication at home which has not alleviated her pain.  She had a virtual visit today and her meds were refilled but after this her medications did not control and she had increased pain in her extremities and rib cage and back where she normally has her pain.  She was given fluids and pain medication per her protocol.  It is started to somewhat alleviate her pain but she still has this.  Her labs show her hemoglobin is lower than her baseline at 9.5, and her retics are up to 24 when she runs in the 2-3 range.  She does have a white count but on many previous laboratory work she does have a chronically elevated white count unsure of the significance.  Patient denied any covid symptoms but after evaluation and treatment and discussion with disposition and plan with her, the patient is asking for breathing treatment as her asthma is acting up.  Her lungs are clear.  Her sats are in the 90s.  She states that she has been having problems with her asthma and cough and wheezing over the last 2 to 3 days.  She was screened multiple times with these questions and denied any of the symptoms until near the end of her visit for disposition.  Given this she is now put into covid precautions.  I will obtain a chest x-ray and test her.  Discussed with the hospitalist to admit her for pain control for her sickle cell.    I have reviewed the nursing notes. I have reviewed the findings, diagnosis, plan and need for follow up with the patient.    New Prescriptions    No medications on file       Final diagnoses:   Sickle cell pain crisis (H)   Covid rule out    --  I, Rachel Michael, am serving as a trained medical scribe to document services personally performed by Artie Taylor MD, based on the provider's statements to me.      Artie NEWMAN MD, was physically present and have reviewed and verified the accuracy of this note documented by Rachel Michael.     [facility], EMERGENCY DEPARTMENT  3/27/2020     Artie Taylor MD  03/28/20  [id number]

## 2020-03-28 NOTE — H&P
[facility]    History and Physical - Night Float Service        Date of Admission:  3/27/2020    Assessment & Plan   Jennifer Cervantes is a 20 year old female with sickle cell disease, prior left MCA stroke, asthma admitted on 3/27/2020 for acute sickle cell pain crisis.    #HbSS disease  #Sickle cell pain crisis  Last seen by Dr. Duncan (her hematologist) on 3/13/2020. Last admitted on 2/28.  Monthly transfusions  Pain plan in chart   No concern for acute chest as = no fever, SOB, & CXR clear  - Hydroxyurea pending heme/onc approval  - Deferasirox 180 mg PO daily  - morphine and keterolac while in ED  - holding celecoxib and oxycodone overnight as oral regimen is pending  - PCA per patients care coordinator note with naloxone PRN  - bowel regimen ordered  - Hematology consult  - Social work consuly    #Asthma  Cough variant asthma  - Symbicort BID + Albuterol q 4 PRN  - CRP and procal for pneumonia r/o  - COVID ordered in ED; pending    #Prior CVA  #Cognitive Impairment from prior CVA  - ASA 81 mg daily    #GERD  - Omeprazole 40 mg PO daily    #Anxiety  Patient with anxiety about transferring care from pediatric to adult side. May be contributing to frequent sickle cell pain crises  - benadryl PO at bedtime       Diet: Regular diet  Fluids: 1/2 NS+D5W  DVT Prophylaxis: Low Risk/Ambulatory with no VTE prophylaxis indicated  Lopez Catheter: not present  Code Status: Full    Disposition Plan   Expected discharge: 2 - 3 days, recommended to prior living arrangement once adequate pain management/ tolerating PO medications.  Entered: Jeb Ramos MD 03/28/2020, 2:28 AM       The patient's care will be formally staffed in AM    Jeb Ramos MD  Night Float Service  [facility]  Pager: [phone]  Please see sticky note for cross cover  information  ______________________________________________________________________    Chief Complaint   Pain    History is obtained from the patient    History of Present Illness   Jennifer Cervantes is a 20 year old female with sickle cell anemia, asthma and prior strokes who presents with constant, dull/aching pain throughout her body notably in her mid back, left leg more than right and bilateral arms that she rates as a 8/10 that started yesterday 8:00AM with some relief by oxycontin and oxycodone this evening, but was not completely resolved/still intolerable to patient.    She does mention a cough, sneezing, increased use of albuterol and  in the past two days which she ascribes to her asthma when the weather changes. It seems to have improved with increased inhalers    LMP: 2 years ago.    Review of Systems    CONSTITUTIONAL: NEGATIVE for fever, chills, change in weight  ENT/MOUTH: Positive for sneezing NEGATIVE for ear, mouth and throat problems  RESP: Positive for cough or SOB  CV: NEGATIVE for chest pain, palpitations or peripheral edema  GI: NEGATIVE for nausea, abdominal pain, heartburn, or change in bowel habits  : negative for decreased urinary stream and hematuria  MUSCULOSKELETAL: NEGATIVE for significant arthralgias or myalgia  NEURO: NEGATIVE for weakness, dizziness or paresthesias  PSYCHIATRIC: anxiety, familial disagreements    Past Medical History    I have reviewed this patient's medical history and updated it with pertinent information if needed.   Past Medical History:   Diagnosis Date     Anemia      Anxiety      Bleeding disorder (H)      Blood clotting disorder (H)      Cerebral infarction (H) 2015     Cognitive developmental delay     low IQ. 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 (H)     right hand contractures     Iron overload due to repeated red blood  cell transfusions      Migraines      Oppositional defiant behavior      Uncomplicated asthma         Past Surgical History   I have reviewed this patient's surgical history and updated it with pertinent information if needed.  Past Surgical History:   Procedure Laterality Date     AS INSERT TUNNELED CV 2 CATH W/O PORT/PUMP       C BREAST REDUCTION (INCLUDES LIPO) TIER 3 Bilateral 04/23/2019     REPAIR TENDON ELBOW Right 10/2/2019    Procedure: Right Elbow Flexor Lengthening, Flexor Pronator Slide Of Wrist and Finger, Thumb Adductor Lengthening;  Surgeon: Anai Franco MD;  Location: [facility] OR     TONSILLECTOMY Bilateral 10/2/2019    Procedure: Bilateral Tonsillectomy;  Surgeon: Farhana Guy MD;  Location: [facility] OR       Social History   I have reviewed this patient's social history and updated it with pertinent information if needed. Jennifer Cervantes lives with her mom and siblings and is looking for her own living situation.  reports that she has never smoked. She has never used smokeless tobacco. She reports previous alcohol use. She reports that she does not use drugs. Denies sexual activity    Family History   I have reviewed this patient's family history and updated it with pertinent information if needed.   Family History   Problem Relation Age of Onset     Sickle Cell Trait Mother      Sickle Cell Trait Father        Prior to Admission Medications   Prior to Admission Medications   Prescriptions Last Dose Informant Patient Reported? Taking?   Budesonide-Formoterol Fumarate (SYMBICORT IN)   Yes No   Sig: Inhale  into the lungs.   EPINEPHrine (EPIPEN) 0.3 MG/0.3ML injection   Yes No   GNP SENNA-LAX 8.6 MG tablet   Yes No   Sig: Take 1 tablet by mouth 2 times daily as needed for constipation   JADENU 360 MG tablet   Yes No   Sig: Take 2 tablets (720 mg) by mouth daily With one 180mg tab for a total daily dose of 900mg   OXYCONTIN 10 MG 12 hr tablet   No No   Sig: Take 1 tablet (10 mg) by mouth  every 12 hours   PROAIR  (90 Base) MCG/ACT inhaler   Yes No   Sig: Inhale 2 puffs into the lungs every 4 hours as needed for shortness of breath / dyspnea or wheezing   acetaminophen (TYLENOL) 325 MG tablet   Yes No   Sig: Take 2 tablets (650 mg) by mouth every 6 hours as needed for mild pain   albuterol (PROVENTIL) (2.5 MG/3ML) 0.083% neb solution   No No   Sig: Take 1 vial (2.5 mg) by nebulization every 6 hours as needed for shortness of breath / dyspnea or wheezing   aspirin (ASA) 81 MG tablet   Yes No   Sig: Take 1 tablet (81 mg) by mouth daily   celecoxib (CELEBREX) 100 MG capsule   Yes No   Sig: Take 100 mg by mouth   cyproheptadine (PERIACTIN) 4 MG tablet   Yes No   deferasirox (JADENU) 180 MG tablet   Yes No   Sig: Take 1 tablet (180 mg) by mouth daily With two 360mg tabs for a total daily dose of 900mg   diphenhydrAMINE (BENADRYL) 25 MG capsule   No No   Sig: Take 1-2 capsules (25-50 mg) by mouth nightly as needed for sleep   erythromycin ethylsuccinate (E.E.S.) 400 MG tablet   Yes No   Sig: Take 1 tablet (400 mg) by mouth 3 times daily Before meals   gabapentin (NEURONTIN) 300 MG capsule   No No   Sig: Take 3 capsules (900 mg) by mouth 3 times daily   hydroxyurea (HYDREA) 500 MG capsule CHEMO   Yes No   Sig: Take 4 capsules (2,000 mg) by mouth daily   omeprazole (PRILOSEC) 40 MG DR capsule   Yes No   Sig: Take 1 capsule (40 mg) by mouth daily   ondansetron (ZOFRAN) 8 MG tablet   Yes No   oxyCODONE IR (ROXICODONE) 10 MG tablet   No No   Sig: Take 1 tablet by mouth every 4-6 hours as needed for severe pain.   pregabalin (LYRICA) 75 MG capsule   Yes No   sertraline (ZOLOFT) 100 MG tablet   Yes No   Sig: Take 1.5 tablets (150 mg) by mouth daily      Facility-Administered Medications: None     Allergies   Allergies   Allergen Reactions     Fish-Derived Products Hives     Seafood Hives     Contrast Dye      Diagnostic X-Ray Materials      Gadolinium        Physical Exam   Vital Signs: Temp: 98.1  F (36.7   C) Temp src: Oral BP: 119/76 Pulse: 87   Resp: 15 SpO2: 95 % O2 Device: None (Room air)    Weight: 150 lbs 0 oz    General Appearance: A&Ox3, NAD  Eyes: no scleral icterus, EOMI  HEENT: No oral ulcer, no thrush  Respiratory: CTAB, on room air  Cardiovascular: S1 S2 RRR no m/g/r.  GI: +BS, no HSM, NTTP  Genitourinary: No CVA tenderness. No catheter  Musculoskeletal: No muscle or joint tenderness. No effusions  Neurologic: CNII-XII intact. Right arm in flexion, immobile. 5/5 muscle strength in all other extremities.  Skin: Port clean, non-tender, non-erythematous    Data   Data reviewed today: I reviewed all medications, new labs and imaging results over the last 24 hours. I personally reviewed the chest x-ray image(s) showing Lungs clear. Previous right upper lung infiltrate resolved. Normal heart size. Left chest wall port with catheter tip in the SVC..    Recent Labs   Lab 03/27/20  2300   WBC 18.2*   HGB 8.0*   MCV 93         POTASSIUM 3.7   CHLORIDE 105   CO2 22   BUN 8   CR 0.56   ANIONGAP 8   MICAH 8.9   GLC 79

## 2020-03-29 ENCOUNTER — PATIENT OUTREACH (OUTPATIENT)
Dept: CARE COORDINATION | Facility: CLINIC | Age: 20
End: 2020-03-29

## 2020-03-29 ENCOUNTER — APPOINTMENT (OUTPATIENT)
Dept: GENERAL RADIOLOGY | Facility: CLINIC | Age: 20
DRG: 812 | End: 2020-03-29
Attending: STUDENT IN AN ORGANIZED HEALTH CARE EDUCATION/TRAINING PROGRAM
Payer: COMMERCIAL

## 2020-03-29 VITALS
HEIGHT: 64 IN | DIASTOLIC BLOOD PRESSURE: 64 MMHG | HEART RATE: 98 BPM | WEIGHT: 153.5 LBS | RESPIRATION RATE: 18 BRPM | SYSTOLIC BLOOD PRESSURE: 124 MMHG | BODY MASS INDEX: 26.21 KG/M2 | OXYGEN SATURATION: 94 % | TEMPERATURE: 95.9 F

## 2020-03-29 LAB
ALBUMIN SERPL-MCNC: 3.9 G/DL (ref 3.4–5)
ALP SERPL-CCNC: 88 U/L (ref 40–150)
ALT SERPL W P-5'-P-CCNC: 66 U/L (ref 0–50)
ANION GAP SERPL CALCULATED.3IONS-SCNC: 6 MMOL/L (ref 3–14)
AST SERPL W P-5'-P-CCNC: 65 U/L (ref 0–45)
BILIRUB SERPL-MCNC: 4.2 MG/DL (ref 0.2–1.3)
BUN SERPL-MCNC: 9 MG/DL (ref 7–30)
CALCIUM SERPL-MCNC: 8.8 MG/DL (ref 8.5–10.1)
CHLORIDE SERPL-SCNC: 106 MMOL/L (ref 94–109)
CO2 SERPL-SCNC: 24 MMOL/L (ref 20–32)
CREAT SERPL-MCNC: 0.6 MG/DL (ref 0.52–1.04)
CRP SERPL-MCNC: 21 MG/L (ref 0–8)
ERYTHROCYTE [DISTWIDTH] IN BLOOD BY AUTOMATED COUNT: 19.7 % (ref 10–15)
GFR SERPL CREATININE-BSD FRML MDRD: >90 ML/MIN/{1.73_M2}
GLUCOSE SERPL-MCNC: 86 MG/DL (ref 70–99)
HCT VFR BLD AUTO: 22.7 % (ref 35–47)
HGB BLD-MCNC: 7.6 G/DL (ref 11.7–15.7)
INTERPRETATION ECG - MUSE: NORMAL
MCH RBC QN AUTO: 30.5 PG (ref 26.5–33)
MCHC RBC AUTO-ENTMCNC: 33.5 G/DL (ref 31.5–36.5)
MCV RBC AUTO: 91 FL (ref 78–100)
PLATELET # BLD AUTO: 259 10E9/L (ref 150–450)
POTASSIUM SERPL-SCNC: 3.9 MMOL/L (ref 3.4–5.3)
PROCALCITONIN SERPL-MCNC: 0.48 NG/ML
PROT SERPL-MCNC: 7.7 G/DL (ref 6.8–8.8)
RBC # BLD AUTO: 2.49 10E12/L (ref 3.8–5.2)
SODIUM SERPL-SCNC: 136 MMOL/L (ref 133–144)
WBC # BLD AUTO: 16.1 10E9/L (ref 4–11)

## 2020-03-29 PROCEDURE — 36415 COLL VENOUS BLD VENIPUNCTURE: CPT | Performed by: ORTHOPAEDIC SURGERY

## 2020-03-29 PROCEDURE — 80053 COMPREHEN METABOLIC PANEL: CPT | Performed by: ORTHOPAEDIC SURGERY

## 2020-03-29 PROCEDURE — 99239 HOSP IP/OBS DSCHRG MGMT >30: CPT | Performed by: INTERNAL MEDICINE

## 2020-03-29 PROCEDURE — 84145 PROCALCITONIN (PCT): CPT | Performed by: ORTHOPAEDIC SURGERY

## 2020-03-29 PROCEDURE — 99231 SBSQ HOSP IP/OBS SF/LOW 25: CPT | Performed by: INTERNAL MEDICINE

## 2020-03-29 PROCEDURE — 25000128 H RX IP 250 OP 636: Performed by: INTERNAL MEDICINE

## 2020-03-29 PROCEDURE — 25000132 ZZH RX MED GY IP 250 OP 250 PS 637: Performed by: ORTHOPAEDIC SURGERY

## 2020-03-29 PROCEDURE — 71046 X-RAY EXAM CHEST 2 VIEWS: CPT

## 2020-03-29 PROCEDURE — 85027 COMPLETE CBC AUTOMATED: CPT | Performed by: ORTHOPAEDIC SURGERY

## 2020-03-29 PROCEDURE — 86140 C-REACTIVE PROTEIN: CPT | Performed by: ORTHOPAEDIC SURGERY

## 2020-03-29 PROCEDURE — 25000132 ZZH RX MED GY IP 250 OP 250 PS 637: Performed by: STUDENT IN AN ORGANIZED HEALTH CARE EDUCATION/TRAINING PROGRAM

## 2020-03-29 RX ORDER — HEPARIN SODIUM (PORCINE) LOCK FLUSH IV SOLN 100 UNIT/ML 100 UNIT/ML
5 SOLUTION INTRAVENOUS
Status: DISCONTINUED | OUTPATIENT
Start: 2020-03-29 | End: 2020-03-29 | Stop reason: HOSPADM

## 2020-03-29 RX ORDER — HEPARIN SODIUM,PORCINE 10 UNIT/ML
5-10 VIAL (ML) INTRAVENOUS
Status: DISCONTINUED | OUTPATIENT
Start: 2020-03-29 | End: 2020-03-29 | Stop reason: HOSPADM

## 2020-03-29 RX ORDER — HEPARIN SODIUM,PORCINE 10 UNIT/ML
5-10 VIAL (ML) INTRAVENOUS EVERY 24 HOURS
Status: DISCONTINUED | OUTPATIENT
Start: 2020-03-29 | End: 2020-03-29 | Stop reason: HOSPADM

## 2020-03-29 RX ORDER — ALBUTEROL SULFATE 0.83 MG/ML
2.5 SOLUTION RESPIRATORY (INHALATION)
Status: DISCONTINUED | OUTPATIENT
Start: 2020-03-29 | End: 2020-03-29 | Stop reason: HOSPADM

## 2020-03-29 RX ADMIN — HEPARIN 5 ML: 100 SYRINGE at 14:25

## 2020-03-29 RX ADMIN — HYDROXYUREA 2000 MG: 500 CAPSULE ORAL at 09:09

## 2020-03-29 RX ADMIN — OXYCODONE HYDROCHLORIDE 10 MG: 10 TABLET, FILM COATED, EXTENDED RELEASE ORAL at 12:01

## 2020-03-29 RX ADMIN — OMEPRAZOLE 40 MG: 20 CAPSULE, DELAYED RELEASE ORAL at 09:21

## 2020-03-29 RX ADMIN — ACETAMINOPHEN 975 MG: 325 TABLET, FILM COATED ORAL at 04:54

## 2020-03-29 RX ADMIN — ASPIRIN 81 MG CHEWABLE TABLET 81 MG: 81 TABLET CHEWABLE at 09:08

## 2020-03-29 RX ADMIN — ACETAMINOPHEN 975 MG: 325 TABLET, FILM COATED ORAL at 12:01

## 2020-03-29 RX ADMIN — FLUTICASONE FUROATE AND VILANTEROL TRIFENATATE 1 PUFF: 100; 25 POWDER RESPIRATORY (INHALATION) at 09:13

## 2020-03-29 RX ADMIN — SERTRALINE HYDROCHLORIDE 150 MG: 50 TABLET ORAL at 09:08

## 2020-03-29 ASSESSMENT — ACTIVITIES OF DAILY LIVING (ADL)
ADLS_ACUITY_SCORE: 12
ADLS_ACUITY_SCORE: 13
ADLS_ACUITY_SCORE: 12
ADLS_ACUITY_SCORE: 12
ADLS_ACUITY_SCORE: 13

## 2020-03-29 NOTE — PROGRESS NOTES
"Social Work Services Progress Note    Hospital Day: 3  Date of Initial Social Work Evaluation:  N/A  Collaborated with:  Patient, Chart Review, Bedside RN    Data:  Jennifer is a 21 yo female with sickle cell disease, prior left MCA stroke, asthma admitted on 3/27/2020 for acute sickle cell pain crisis.  SW received consult to complete psychosocial assessment for discharge planning, housing/lodging needs, MH issues, and community resources.  Jennifer recently transferred care from [facility] to this healthcare system. Pt is currently r/o COVID and therefore SW attempted to complete assessment via phone.    Intervention:  SW spoke with pt briefly via phone.  She stated \"I just want to go home\" and did not want to speak with SW.  SW attempted to offer support, encouraged pt to further express her feelings, and offered to listen but pt not willing to speak with SW at this time and ultimately hung up.  SW spoke with bedside RN and encouraged for SW to be paged if pt more willing to talk at a later time today.    Per chart review, pt is currently on the waiting list for public housing and has been for about 2-3 years.  She receives about $800/month from SSDI/SSI and no other income sources noted at this time.  She has a [facility] and ILS Worker, but SW unable to identify their names/contact numbers at this time.  Pt has a MH history including anxiety/depression, currently takes Zoloft, and office visit note indicates that she is looking to establish with an adult therapist, but it does not appear that she has yet.  She also has a documented low IQ/neurodevelopmental deficits d/t h/o stroke.  She appears to have very limited social support from her family, and a recent support person appears to be Sarah Geiger (no HCD on file or indication that SW has permission to contact her).    Assessment:  Pt appears to have limited support and has complex medical history.  She was frustrated today, unwilling to " engage with SW, and therefore full assesment could not be completed at this time.    Plan:    Anticipated Disposition:  TBD - Home anticipated    Barriers to d/c plan:  Medical stability    Follow Up:  SW will continue to follow and attempt further assessment when pt is willing and able to engage.    For weekend social work needs, contact information below and avail on [employer]:  4A, 4C, 4E, 5A, 5B pager [phone]    Arely Gamble, LICSW

## 2020-03-29 NOTE — DISCHARGE SUMMARY
Pt escourted off unit via wheelchair by NST with all of her belongings. Cousin outside hospital to provide ride home.

## 2020-03-29 NOTE — PLAN OF CARE
Pt admitted 3/27 for sickle cell crisis and rule out COVID-19. Pt was A&Ox4, up ad janett. Pt's mood was flat and withdrawn. Pt stated she wanted to go home. Pt said her pain has improved since admission. Pt was using her PCA Morphine. Pt also received Tylenol for pain. Pt did not have any BMs and received one senna. Pt did not have an appetite and did not have dinner. Pt able to make her need known. Continue with plan of care.

## 2020-03-29 NOTE — DISCHARGE SUMMARY
[facility], [place]  Hospitalist Discharge Summary       Date of Admission:  3/27/2020  Date of Discharge:  3/29/2020  Discharging Provider: Carlo Peck MD  Discharge Team: Hospitalist Service, Gold 9    Discharge Diagnoses   Sickle cell crisis    Follow-ups Needed After Discharge   Follow-up Appointments     Adult [facility] Follow-up and recommended labs and tests      Follow up with primary care provider, Daya Carter, within 7 days for   hospital follow- up.  No follow up labs or test are needed.      Appointments on [place] and/or [facility] (with [facility] or [facility]   provider or service). Call [phone] if you haven't heard regarding   these appointments within 7 days of discharge.             Unresulted Labs Ordered in the Past 30 Days of this Admission     No orders found from 2/26/2020 to 3/28/2020.      These results will be followed up by N/A    Discharge Disposition   Discharged to home  Condition at discharge: Stable     Hospital Course   20 year old female with sickle cell disease, prior left MCA stroke, asthma admitted on 3/27/2020 for acute sickle cell pain crisis.       1) Sickle cell pain crisis  - Patient unwilling to stay for further workup, asking to be discharged  - States she has enough oxycodone at home and does not need any scripts  - Continue all PTA medications as previously prescribed    2) Cough variant asthma: Improved, continue aerosols, COVID-19 negative     3) Hx CVA: Continue ASA 81 mg daily     4) GERD: Omeprazole 40 mg PO daily     5) Anxiety: Stable      Consultations This Hospital Stay   SOCIAL WORK IP CONSULT  HEMATOLOGY ADULT IP CONSULT  SOCIAL WORK IP CONSULT    Code Status   Full Code    Time Spent on this Encounter   I, Carlo Peck MD, personally saw the patient today and spent greater than 30 minutes discharging this patient.       Carlo Peck MD  [facility],  [place]  ______________________________________________________________________    Physical Exam   Vital Signs: Temp: 95.9  F (35.5  C) Temp src: Oral BP: 124/64 Pulse: 98   Resp: 18 SpO2: 94 % O2 Device: Nasal cannula Oxygen Delivery: 2 LPM  Weight: 153 lbs 8 oz    Deferred by patient       Primary Care Physician   Daya Carter    Discharge Orders      Reason for your hospital stay    You were admitted for sickle cell crisis requiring IV mediations for pain control.  Please take your previous medications as prescribed.     Activity    Your activity upon discharge: activity as tolerated     Adult [facility] Follow-up and recommended labs and tests    Follow up with primary care provider, Daya Carter, within 7 days for hospital follow- up.  No follow up labs or test are needed.      Appointments on [place] and/or [facility] (with [facility] or [facility] provider or service). Call [phone] if you haven't heard regarding these appointments within 7 days of discharge.     Full Code     Diet    Follow this diet upon discharge: Orders Placed This Encounter      Regular Diet Adult       Significant Results and Procedures     Results for orders placed or performed during the hospital encounter of 03/27/20   XR Chest Port 1 View    Narrative    EXAM: XR CHEST PORT 1 VW  LOCATION: [facility]  DATE/TIME: 3/28/2020 1:59 AM    INDICATION: Cough.  COMPARISON: 10/10/2019.      Impression    IMPRESSION: Lungs clear. Previous right upper lung infiltrate resolved. Normal heart size. Left chest wall port with catheter tip in the SVC.   XR Chest 2 Views    Narrative    XR CHEST 2 VW3/29/2020 10:10 AM    INDICATION: Hypoxia    COMPARISON:  3/28/2020    FINDINGS: PA and lateral views of the chest. Left chest port with tip  near the cavoatrial junction. Subtle increased interstitial markings  within the right lower lung seen best on the PA view.  Cardiomediastinal silhouette is within normal limits. Upper abdomen  is  unremarkable. Cholecystectomy clips. No acute osseous abnormalities.      Impression    IMPRESSION: Interval increase in subtle interstitial markings at the  right lung base. Differential diagnosis includes atelectasis although  an early infection cannot be excluded.    I have personally reviewed the examination and initial interpretation  and I agree with the findings.    RAJ VIDES MD       Discharge Medications   Current Discharge Medication List      CONTINUE these medications which have NOT CHANGED    Details   acetaminophen (TYLENOL) 325 MG tablet Take 2 tablets (650 mg) by mouth every 6 hours as needed for mild pain      albuterol (PROVENTIL) (2.5 MG/3ML) 0.083% neb solution Take 1 vial (2.5 mg) by nebulization every 6 hours as needed for shortness of breath / dyspnea or wheezing  Qty: 12 mL, Refills: 4    Associated Diagnoses: 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)      aspirin (ASA) 81 MG tablet Take 1 tablet (81 mg) by mouth daily      Budesonide-Formoterol Fumarate (SYMBICORT IN) Inhale  into the lungs.      celecoxib (CELEBREX) 100 MG capsule Take 100 mg by mouth      cyproheptadine (PERIACTIN) 4 MG tablet       !! deferasirox (JADENU) 180 MG tablet Take 1 tablet (180 mg) by mouth daily With two 360mg tabs for a total daily dose of 900mg      diphenhydrAMINE (BENADRYL) 25 MG capsule Take 1-2 capsules (25-50 mg) by mouth nightly as needed for sleep  Qty: 60 capsule, Refills: 3    Associated Diagnoses: Insomnia, unspecified type      EPINEPHrine (EPIPEN) 0.3 MG/0.3ML injection       erythromycin ethylsuccinate (E.E.S.) 400 MG tablet Take 1 tablet (400 mg) by mouth 3 times daily Before meals      gabapentin (NEURONTIN) 300 MG capsule Take 3 capsules (900 mg) by mouth 3 times daily    Associated Diagnoses: Chronic pain syndrome      GNP SENNA-LAX 8.6 MG tablet Take 1 tablet by mouth 2 times daily as needed for constipation      hydroxyurea (HYDREA) 500 MG capsule CHEMO Take 4 capsules (2,000 mg) by mouth  daily      !! JADENU 360 MG tablet Take 2 tablets (720 mg) by mouth daily With one 180mg tab for a total daily dose of 900mg      omeprazole (PRILOSEC) 40 MG DR capsule Take 1 capsule (40 mg) by mouth daily      ondansetron (ZOFRAN) 8 MG tablet       oxyCODONE IR (ROXICODONE) 10 MG tablet Take 1 tablet by mouth every 4-6 hours as needed for severe pain.  Qty: 56 tablet, Refills: 0    Associated Diagnoses: 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)      OXYCONTIN 10 MG 12 hr tablet Take 1 tablet (10 mg) by mouth every 12 hours  Qty: 60 tablet, Refills: 0    Associated Diagnoses: 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)      pregabalin (LYRICA) 75 MG capsule       PROAIR  (90 Base) MCG/ACT inhaler Inhale 2 puffs into the lungs every 4 hours as needed for shortness of breath / dyspnea or wheezing    Comments: Pharmacy may dispense brand covered by insurance (Proair, or proventil or ventolin or generic albuterol inhaler)      sertraline (ZOLOFT) 100 MG tablet Take 1.5 tablets (150 mg) by mouth daily  Qty: 30 tablet, Refills: 0       !! - Potential duplicate medications found. Please discuss with provider.        Allergies   Allergies   Allergen Reactions     Fish-Derived Products Hives     Seafood Hives     Contrast Dye      Diagnostic X-Ray Materials      Gadolinium

## 2020-03-29 NOTE — PLAN OF CARE
Tx from 5A to 7C at 1230. Upon Tx, MD arrived and gave the ok to discharge pt from hospital.     PCA discontinued. Port 28 day HL and de-accessed. Discharge instructions gone over by both MD and RN at bedside. Paperwork signed and ride arranged for later this evening. No meds to  at discharge pharm.    Pt is refusing all cares including VS and skin assessment. Will continue to monitor until pt has discharged.

## 2020-03-29 NOTE — PROGRESS NOTES
"Interim history:  Himanshu was placed on oxygen last night due to low O2 saturation. She reports that her O2 sat always drops when she sleeps. She is upset and says she wants to go home. She is unwilling to tell me what she is upset about. She refuses to answer any specific questions.       PHYSICAL EXAMINATION:  /64 (BP Location: Right arm)   Pulse 98   Temp 95.9  F (35.5  C) (Oral)   Resp 18   Ht 1.626 m (5' 4\")   Wt 69.6 kg (153 lb 8 oz)   LMP  (LMP Unknown)   SpO2 94%   BMI 26.35 kg/m      General appearance:  Patient is 20 year old, Looks comfortable, reading her phone.    I did not examine her.     Labs:  Results for HIMANSHU AL (MRN [id number]) as of 3/29/2020 09:56   Ref. Range 3/29/2020 06:09   Sodium Latest Ref Range: 133 - 144 mmol/L 136   Potassium Latest Ref Range: 3.4 - 5.3 mmol/L 3.9   Chloride Latest Ref Range: 94 - 109 mmol/L 106   Carbon Dioxide Latest Ref Range: 20 - 32 mmol/L 24   Urea Nitrogen Latest Ref Range: 7 - 30 mg/dL 9   Creatinine Latest Ref Range: 0.52 - 1.04 mg/dL 0.60   GFR Estimate Latest Ref Range: >60 mL/min/1.73_m2 >90   GFR Estimate If Black Latest Ref Range: >60 mL/min/1.73_m2 >90   Calcium Latest Ref Range: 8.5 - 10.1 mg/dL 8.8   Anion Gap Latest Ref Range: 3 - 14 mmol/L 6   Albumin Latest Ref Range: 3.4 - 5.0 g/dL 3.9   Protein Total Latest Ref Range: 6.8 - 8.8 g/dL 7.7   Bilirubin Total Latest Ref Range: 0.2 - 1.3 mg/dL 4.2 (H)   Alkaline Phosphatase Latest Ref Range: 40 - 150 U/L 88   ALT Latest Ref Range: 0 - 50 U/L 66 (H)   AST Latest Ref Range: 0 - 45 U/L 65 (H)   CRP Inflammation Latest Ref Range: 0.0 - 8.0 mg/L 21.0 (H)   Procalcitonin Latest Units: ng/ml 0.48   Glucose Latest Ref Range: 70 - 99 mg/dL 86   WBC Latest Ref Range: 4.0 - 11.0 10e9/L 16.1 (H)   Hemoglobin Latest Ref Range: 11.7 - 15.7 g/dL 7.6 (L)   Hematocrit Latest Ref Range: 35.0 - 47.0 % 22.7 (L)   Platelet Count Latest Ref Range: 150 - 450 10e9/L 259   RBC Count Latest Ref Range: 3.8 - " "5.2 10e12/L 2.49 (L)   MCV Latest Ref Range: 78 - 100 fl 91   MCH Latest Ref Range: 26.5 - 33.0 pg 30.5   MCHC Latest Ref Range: 31.5 - 36.5 g/dL 33.5   RDW Latest Ref Range: 10.0 - 15.0 % 19.7 (H)   XR CHEST 2 VW3/29/2020 10:10 AM     INDICATION: Hypoxia     COMPARISON:  3/28/2020     FINDINGS: PA and lateral views of the chest. Left chest port with tip  near the cavoatrial junction. Subtle increased interstitial markings  within the right lower lung seen best on the PA view.  Cardiomediastinal silhouette is within normal limits. Upper abdomen is  unremarkable. Cholecystectomy clips. No acute osseous abnormalities.                                                                      IMPRESSION: Interval increase in subtle interstitial markings at the  right lung base. Differential diagnosis includes atelectasis although  an early infection cannot be excluded.     I have personally reviewed the examination and initial interpretation  and I agree with the findings.     RAJ VIDES MD    Assessment and Recommendation:  Sickle cell crisis- No clear sign of infection.No acute chest syndrome. Her bilirubin as a bit more elevated today. She seems upset about her care, but is unwilling to tell me what is wrong or what we can do better. I do see that she refused capno last night. In one nursing note, it mentions that the patient asked for a nebulizer, but in MAR I don't see any doses of her prn nebulizers or albuterol given. She did receive her Breo-Ellipta inhaler this am. I don't know if this is a source of disatisfaction. She is in no respiratory distress this am, although I did not listen to her lungs. She does have a small infiltrate on CXR. She is on a PCA of intermittent morphine along with her usual outpatient dose of Oxycontin. It would be optimal if she stays another day to monitor her breathing and make sure her pain is controlled. However, if she wants to leave, I would not declare it as \"against medical " "advice\".   I will send a note to her primary hematologist, Dr. Duncan, and the hematology clinic can follow up with her the day after discharge.     Nighat Thornton MD  Hematology  "

## 2020-03-29 NOTE — PROVIDER NOTIFICATION
Cross cover notified about pt requiring 1L via NC due saturations dipping to 87-88%. Pt still refusing Capno, continuous pulse ox in place. Provider ordered 2 view chest xray for AM.     Will continue to monitor.

## 2020-03-29 NOTE — PLAN OF CARE
"0700- 1200    Pt A&Ox4 reporting generalized pain. Denies nausea this shift. Independent in room. Pt on PCA pump into port. Voiding regularly. COVID result back as not detected. Poor appetite.   VSS on RA    /64 (BP Location: Right arm)   Pulse 98   Temp 95.9  F (35.5  C) (Oral)   Resp 18   Ht 1.626 m (5' 4\")   Wt 69.6 kg (153 lb 8 oz)   LMP  (LMP Unknown)   SpO2 94%   BMI 26.35 kg/m      Plan to transfer to  and possible discharge in evening or tomorrow.   "

## 2020-03-30 ENCOUNTER — TELEPHONE (OUTPATIENT)
Dept: ONCOLOGY | Facility: CLINIC | Age: 20
End: 2020-03-30

## 2020-03-30 ENCOUNTER — INFUSION THERAPY VISIT (OUTPATIENT)
Dept: INFUSION THERAPY | Facility: CLINIC | Age: 20
End: 2020-03-30
Attending: PEDIATRICS
Payer: COMMERCIAL

## 2020-03-30 ENCOUNTER — APPOINTMENT (OUTPATIENT)
Dept: LAB | Facility: CLINIC | Age: 20
End: 2020-03-30
Attending: PEDIATRICS
Payer: COMMERCIAL

## 2020-03-30 VITALS
TEMPERATURE: 97 F | WEIGHT: 154.8 LBS | OXYGEN SATURATION: 91 % | BODY MASS INDEX: 26.57 KG/M2 | DIASTOLIC BLOOD PRESSURE: 81 MMHG | SYSTOLIC BLOOD PRESSURE: 124 MMHG | HEART RATE: 99 BPM | RESPIRATION RATE: 18 BRPM

## 2020-03-30 DIAGNOSIS — D57.1 HB-SS DISEASE WITHOUT CRISIS (H): Primary | ICD-10-CM

## 2020-03-30 DIAGNOSIS — D57.00 SICKLE CELL PAIN CRISIS (H): ICD-10-CM

## 2020-03-30 DIAGNOSIS — E83.111 IRON OVERLOAD DUE TO REPEATED RED BLOOD CELL TRANSFUSIONS: ICD-10-CM

## 2020-03-30 LAB
ALBUMIN SERPL-MCNC: 4 G/DL (ref 3.4–5)
ALP SERPL-CCNC: 88 U/L (ref 40–150)
ALT SERPL W P-5'-P-CCNC: 90 U/L (ref 0–50)
ANION GAP SERPL CALCULATED.3IONS-SCNC: 8 MMOL/L (ref 3–14)
ANISOCYTOSIS BLD QL SMEAR: ABNORMAL
AST SERPL W P-5'-P-CCNC: 88 U/L (ref 0–45)
BASOPHILS # BLD AUTO: 0.2 10E9/L (ref 0–0.2)
BASOPHILS NFR BLD AUTO: 1 %
BILIRUB SERPL-MCNC: 4 MG/DL (ref 0.2–1.3)
BUN SERPL-MCNC: 10 MG/DL (ref 7–30)
CALCIUM SERPL-MCNC: 8.8 MG/DL (ref 8.5–10.1)
CHLORIDE SERPL-SCNC: 107 MMOL/L (ref 94–109)
CO2 SERPL-SCNC: 23 MMOL/L (ref 20–32)
CREAT SERPL-MCNC: 0.54 MG/DL (ref 0.52–1.04)
DIFFERENTIAL METHOD BLD: ABNORMAL
EOSINOPHIL # BLD AUTO: 0.4 10E9/L (ref 0–0.7)
EOSINOPHIL NFR BLD AUTO: 2 %
ERYTHROCYTE [DISTWIDTH] IN BLOOD BY AUTOMATED COUNT: 21.3 % (ref 10–15)
FERRITIN SERPL-MCNC: ABNORMAL NG/ML (ref 12–150)
GFR SERPL CREATININE-BSD FRML MDRD: >90 ML/MIN/{1.73_M2}
GLUCOSE SERPL-MCNC: 104 MG/DL (ref 70–99)
HCT VFR BLD AUTO: 22.5 % (ref 35–47)
HGB BLD-MCNC: 7.9 G/DL (ref 11.7–15.7)
LYMPHOCYTES # BLD AUTO: 1.6 10E9/L (ref 0.8–5.3)
LYMPHOCYTES NFR BLD AUTO: 9 %
MCH RBC QN AUTO: 30.6 PG (ref 26.5–33)
MCHC RBC AUTO-ENTMCNC: 35.1 G/DL (ref 31.5–36.5)
MCV RBC AUTO: 87 FL (ref 78–100)
MONOCYTES # BLD AUTO: 0.2 10E9/L (ref 0–1.3)
MONOCYTES NFR BLD AUTO: 1 %
NEUTROPHILS # BLD AUTO: 15.7 10E9/L (ref 1.6–8.3)
NEUTROPHILS NFR BLD AUTO: 87 %
NRBC # BLD AUTO: 2.7 10*3/UL
NRBC BLD AUTO-RTO: 15 /100
PLATELET # BLD AUTO: 306 10E9/L (ref 150–450)
PLATELET # BLD EST: ABNORMAL 10*3/UL
POIKILOCYTOSIS BLD QL SMEAR: ABNORMAL
POLYCHROMASIA BLD QL SMEAR: ABNORMAL
POTASSIUM SERPL-SCNC: 3.5 MMOL/L (ref 3.4–5.3)
PROT SERPL-MCNC: 7.6 G/DL (ref 6.8–8.8)
RBC # BLD AUTO: 2.58 10E12/L (ref 3.8–5.2)
RETICS # AUTO: 727.6 10E9/L (ref 25–95)
RETICS/RBC NFR AUTO: 28.2 % (ref 0.5–2)
SICKLE CELLS BLD QL SMEAR: ABNORMAL
SODIUM SERPL-SCNC: 137 MMOL/L (ref 133–144)
WBC # BLD AUTO: 18.1 10E9/L (ref 4–11)

## 2020-03-30 PROCEDURE — 25800030 ZZH RX IP 258 OP 636: Mod: ZF | Performed by: PHYSICIAN ASSISTANT

## 2020-03-30 PROCEDURE — 96376 TX/PRO/DX INJ SAME DRUG ADON: CPT

## 2020-03-30 PROCEDURE — 82728 ASSAY OF FERRITIN: CPT | Performed by: PEDIATRICS

## 2020-03-30 PROCEDURE — 85045 AUTOMATED RETICULOCYTE COUNT: CPT | Performed by: PEDIATRICS

## 2020-03-30 PROCEDURE — 96361 HYDRATE IV INFUSION ADD-ON: CPT

## 2020-03-30 PROCEDURE — 85025 COMPLETE CBC W/AUTO DIFF WBC: CPT | Performed by: PEDIATRICS

## 2020-03-30 PROCEDURE — 80053 COMPREHEN METABOLIC PANEL: CPT | Performed by: PEDIATRICS

## 2020-03-30 PROCEDURE — 96374 THER/PROPH/DIAG INJ IV PUSH: CPT

## 2020-03-30 PROCEDURE — 25000132 ZZH RX MED GY IP 250 OP 250 PS 637: Mod: ZF | Performed by: PHYSICIAN ASSISTANT

## 2020-03-30 PROCEDURE — 25000128 H RX IP 250 OP 636: Mod: ZF | Performed by: PHYSICIAN ASSISTANT

## 2020-03-30 PROCEDURE — 25000128 H RX IP 250 OP 636: Mod: ZF | Performed by: PEDIATRICS

## 2020-03-30 RX ORDER — MORPHINE SULFATE 2 MG/ML
2 INJECTION, SOLUTION INTRAMUSCULAR; INTRAVENOUS
Status: COMPLETED | OUTPATIENT
Start: 2020-03-30 | End: 2020-03-30

## 2020-03-30 RX ORDER — MORPHINE SULFATE 2 MG/ML
2 INJECTION, SOLUTION INTRAMUSCULAR; INTRAVENOUS
Status: CANCELLED
Start: 2020-03-30

## 2020-03-30 RX ORDER — DIPHENHYDRAMINE HCL 25 MG
25 CAPSULE ORAL
Status: CANCELLED
Start: 2020-03-30

## 2020-03-30 RX ORDER — ONDANSETRON 8 MG/1
8 TABLET, ORALLY DISINTEGRATING ORAL ONCE
Status: COMPLETED | OUTPATIENT
Start: 2020-03-30 | End: 2020-03-30

## 2020-03-30 RX ORDER — ONDANSETRON 8 MG/1
8 TABLET, FILM COATED ORAL
Status: CANCELLED
Start: 2020-03-30

## 2020-03-30 RX ORDER — ONDANSETRON 8 MG/1
8 TABLET, FILM COATED ORAL
Status: DISCONTINUED | OUTPATIENT
Start: 2020-03-30 | End: 2020-03-30

## 2020-03-30 RX ORDER — DIPHENHYDRAMINE HCL 25 MG
25 CAPSULE ORAL
Status: COMPLETED | OUTPATIENT
Start: 2020-03-30 | End: 2020-03-30

## 2020-03-30 RX ORDER — KETOROLAC TROMETHAMINE 30 MG/ML
30 INJECTION, SOLUTION INTRAMUSCULAR; INTRAVENOUS ONCE
Status: CANCELLED
Start: 2020-03-30

## 2020-03-30 RX ORDER — HEPARIN SODIUM (PORCINE) LOCK FLUSH IV SOLN 100 UNIT/ML 100 UNIT/ML
5 SOLUTION INTRAVENOUS
Status: DISCONTINUED | OUTPATIENT
Start: 2020-03-30 | End: 2020-03-30 | Stop reason: HOSPADM

## 2020-03-30 RX ORDER — HEPARIN SODIUM,PORCINE 10 UNIT/ML
5 VIAL (ML) INTRAVENOUS
Status: CANCELLED | OUTPATIENT
Start: 2020-03-30

## 2020-03-30 RX ORDER — HEPARIN SODIUM (PORCINE) LOCK FLUSH IV SOLN 100 UNIT/ML 100 UNIT/ML
5 SOLUTION INTRAVENOUS
Status: CANCELLED | OUTPATIENT
Start: 2020-03-30

## 2020-03-30 RX ORDER — HEPARIN SODIUM (PORCINE) LOCK FLUSH IV SOLN 100 UNIT/ML 100 UNIT/ML
5 SOLUTION INTRAVENOUS EVERY 8 HOURS
Status: DISCONTINUED | OUTPATIENT
Start: 2020-03-30 | End: 2020-03-30 | Stop reason: HOSPADM

## 2020-03-30 RX ORDER — KETOROLAC TROMETHAMINE 30 MG/ML
30 INJECTION, SOLUTION INTRAMUSCULAR; INTRAVENOUS ONCE
Status: DISCONTINUED | OUTPATIENT
Start: 2020-03-30 | End: 2020-03-30 | Stop reason: HOSPADM

## 2020-03-30 RX ADMIN — HEPARIN SODIUM (PORCINE) LOCK FLUSH IV SOLN 100 UNIT/ML 5 ML: 100 SOLUTION at 14:42

## 2020-03-30 RX ADMIN — ONDANSETRON 8 MG: 8 TABLET, ORALLY DISINTEGRATING ORAL at 15:06

## 2020-03-30 RX ADMIN — MORPHINE SULFATE 2 MG: 2 INJECTION, SOLUTION INTRAMUSCULAR; INTRAVENOUS at 16:48

## 2020-03-30 RX ADMIN — Medication 5 ML: at 17:00

## 2020-03-30 RX ADMIN — MORPHINE SULFATE 2 MG: 2 INJECTION, SOLUTION INTRAMUSCULAR; INTRAVENOUS at 16:09

## 2020-03-30 RX ADMIN — DIPHENHYDRAMINE HYDROCHLORIDE 25 MG: 25 CAPSULE ORAL at 14:56

## 2020-03-30 RX ADMIN — DEXTROSE AND SODIUM CHLORIDE 1000 ML: 5; 450 INJECTION, SOLUTION INTRAVENOUS at 15:17

## 2020-03-30 RX ADMIN — MORPHINE SULFATE 2 MG: 2 INJECTION, SOLUTION INTRAMUSCULAR; INTRAVENOUS at 15:06

## 2020-03-30 ASSESSMENT — PAIN SCALES - GENERAL: PAINLEVEL: EXTREME PAIN (8)

## 2020-03-30 NOTE — NURSING NOTE
Chief Complaint   Patient presents with     Infusion     IVF/pain meds     Port Draw     Port labs collected by RN.

## 2020-03-30 NOTE — TELEPHONE ENCOUNTER
Writer placed call to patient regarding recent hospital stay. Patient voiced disappointment with her stay at [facility] as she requested nebulizer treatments but did not receive them. Patient left hospital early but not AMA. When discussing the status of her asthma and pain today, patient stated that her asthma is slowly getting better but her pain in her back is still not manageable at home. Patient was under the impressions that she was not able to call in to request IVF/pain meds due to her recent hospital stay. Writer advised that she may call our line any time she is having adverse pain not manageable at home. Patient voiced understanding. Writer able to secure appointment in [facility] today for IVF/pain meds, discussed with Andrei Machado PA-C, who will see patient tomorrow in clinic at 12:00. Message sent to Clinic Coordinator pool for labs at 1:30 and [facility] for IVF/pain meds at 2:00 today and Andrei at 12:00 pm tomorrow. Patient appreciative of care team efforts.

## 2020-03-30 NOTE — PROGRESS NOTES
Nursing Note  Jennifer Cervantes presents today to [facility] for:   Chief Complaint   Patient presents with     Infusion     IVF/pain meds     During today's [facility] appointment, orders from RONALDO Swartz were completed.  Frequency: prn    Progress note:  Patient identification verified by name and date of birth.  Assessment completed.  Vitals recorded in Doc Flowsheets.  Patient was provided with education regarding medication/procedure and possible side effects.  Patient verbalized understanding.     present during visit today: Not Applicable.    Treatment Conditions: Non-applicable.    Premedications: were not ordered.    Drug Waste Record: No    Infusion length and rate:  infusion given over approximately 2 hours  500 ml/hr.    Labs: drawn today, prior to appointment in second floor lab.    Vascular access: port accessed today prior to appointment in second floor lab.     Report given to JOE Gomez. Care transferred at 1530.     Pt given 2nd dose of morphine and pt reported pain was unchanged. Pt would be early for 3rd dose prior to infusion center closing so called provider to see if dose could be given early. Provider agreed to giving last dose 15-20 minutes early.     Discharge Plan:   Follow up plan of care with: ordering provider as scheduled.  Discharge instructions were reviewed with patient.  Patient/representative verbalized understanding of discharge instructions and all questions answered.  Patient discharged from [facility] in stable condition.    Malgorzata Glass RN       Administrations This Visit     dextrose 5% and 0.45% NaCl BOLUS     Admin Date  03/30/2020 Action  New Bag Dose  1000 mL Rate  500 mL/hr Route  Intravenous Administered By  Malgorzata Glass, RN          diphenhydrAMINE (BENADRYL) capsule 25 mg     Admin Date  03/30/2020 Action  Given Dose  25 mg Route  Oral Administered  By  Malgorzata Holcomb, JOE          heparin 100 UNIT/ML injection 5 mL     Admin Date  03/30/2020 Action  Given Dose  5 mL Route  Intracatheter Administered By  Yao Cordero RN          morphine (PF) injection 2 mg     Admin Date  03/30/2020 Action  Given Dose  2 mg Route  Intravenous Administered By  Malgorzata Holcomb, JOE          ondansetron (ZOFRAN-ODT) ODT tab 8 mg     Admin Date  03/30/2020 Action  Given Dose  8 mg Route  Oral Administered By  Malgorzata Hlocomb, RN                BP (!) 142/81 (BP Location: Left arm, Patient Position: Sitting)   Pulse 116   Temp 97  F (36.1  C) (Oral)   Resp 20   Wt 70.2 kg (154 lb 12.8 oz)   LMP  (LMP Unknown)   SpO2 91%   BMI 26.57 kg/m

## 2020-03-30 NOTE — PROGRESS NOTES
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 Please see Dr. Duncan's detailed note from 2/28/20 for complete hematologic history.      She was admitted 3/27/20-3/29/20 for sickle cell pain crisis, left due to her concerns about not being able to do her asthma nebulizer.    I saw her today for hematology follow-up.     Interval History:  Jennifer was seen today in clinic unaccompanied. We spent the first part of the visit discussing her recent hospitalization. She was not happy with how the nursing triage staff spoke to her on 3/27/20 and we discussed how unfortunately after hours our heme onc nurses are not staffing this line. She also was upset she had multiple room changes in the hospital and that she was not given her nebulizer. We discussed that with the COVID situation they are limiting nebulizer treatments inpatient and she expressed understanding. At the end of our discussion she seemed to feel better.    She states that her asthma symptoms are improved since returning home. She denies any SOB, chest pain, or cough. She is still having significant pain in her back, left arm, and legs. The infusion helps with this but she still feels uncomfortable today. She is taking her home meds as prescribed, and is wondering if we could go up on those in the future.     She denies fevers, headaches, dizziness, GI concerns, edema (left leg chronically larger than right after stroke), or rashes. She states she is eating and drinking well.     Current Outpatient Medications   Medication Sig Dispense Refill     acetaminophen (TYLENOL) 325 MG tablet Take 2 tablets (650 mg) by mouth  every 6 hours as needed for mild pain       albuterol (PROVENTIL) (2.5 MG/3ML) 0.083% neb solution Take 1 vial (2.5 mg) by nebulization every 6 hours as needed for shortness of breath / dyspnea or wheezing 12 mL 4     aspirin (ASA) 81 MG tablet Take 1 tablet (81 mg) by mouth daily       Budesonide-Formoterol Fumarate (SYMBICORT IN) Inhale  into the lungs.       celecoxib (CELEBREX) 100 MG capsule Take 100 mg by mouth       cyproheptadine (PERIACTIN) 4 MG tablet        deferasirox (JADENU) 180 MG tablet Take 1 tablet (180 mg) by mouth daily With two 360mg tabs for a total daily dose of 900mg       diphenhydrAMINE (BENADRYL) 25 MG capsule Take 1-2 capsules (25-50 mg) by mouth nightly as needed for sleep 60 capsule 3     EPINEPHrine (EPIPEN) 0.3 MG/0.3ML injection        erythromycin ethylsuccinate (E.E.S.) 400 MG tablet Take 1 tablet (400 mg) by mouth 3 times daily Before meals       gabapentin (NEURONTIN) 300 MG capsule Take 3 capsules (900 mg) by mouth 3 times daily       GNP SENNA-LAX 8.6 MG tablet Take 1 tablet by mouth 2 times daily as needed for constipation       hydroxyurea (HYDREA) 500 MG capsule CHEMO Take 4 capsules (2,000 mg) by mouth daily       JADENU 360 MG tablet Take 2 tablets (720 mg) by mouth daily With one 180mg tab for a total daily dose of 900mg       omeprazole (PRILOSEC) 40 MG DR capsule Take 1 capsule (40 mg) by mouth daily       ondansetron (ZOFRAN) 8 MG tablet        oxyCODONE IR (ROXICODONE) 10 MG tablet Take 1 tablet by mouth every 4-6 hours as needed for severe pain. 56 tablet 0     OXYCONTIN 10 MG 12 hr tablet Take 1 tablet (10 mg) by mouth every 12 hours 60 tablet 0     pregabalin (LYRICA) 75 MG capsule        PROAIR  (90 Base) MCG/ACT inhaler Inhale 2 puffs into the lungs every 4 hours as needed for shortness of breath / dyspnea or wheezing       sertraline (ZOLOFT) 100 MG tablet Take 1.5 tablets (150 mg) by mouth daily 30 tablet 0     Physical Examination:  /63 (BP  "Location: Left arm, Patient Position: Chair, Cuff Size: Adult Regular)   Pulse 98   Temp 98  F (36.7  C) (Oral)   Resp 16   Ht 1.626 m (5' 4.02\")   Wt 70.5 kg (155 lb 8 oz)   LMP  (LMP Unknown)   SpO2 96%   BMI 26.68 kg/m    Wt Readings from Last 10 Encounters:   03/30/20 70.2 kg (154 lb 12.8 oz)   03/28/20 69.6 kg (153 lb 8 oz)   03/13/20 68.9 kg (152 lb)   02/28/20 70.2 kg (154 lb 12.8 oz)   10/24/19 66.2 kg (146 lb)   10/10/19 71.7 kg (158 lb 1.1 oz)   09/25/19 65.4 kg (144 lb 2.9 oz)   09/05/19 65.3 kg (144 lb)   08/29/19 65.3 kg (144 lb)   06/06/19 68 kg (150 lb)     Constitutional: Well-appearing female in no acute distress.  Eyes: EOMI, PERRL. No scleral icterus.  ENT: Oral mucosa is moist without lesions or thrush.   Lymphatic: Neck is supple without cervical or supraclavicular lymphadenopathy.   Cardiovascular: Regular rate and rhythm. No murmurs, gallops, or rubs. No peripheral edema (left leg larger than right chronically per patient).  Respiratory: Clear to auscultation bilaterally. No wheezes or crackles.  Gastrointestinal: Bowel sounds present. Abdomen soft, non-tender.   Neurologic: Cranial nerves II through XII are grossly intact. Right hand with spastic hemiparesis. Walks with limp.   Skin: No rashes, petechiae, or bruising noted on exposed skin.    Laboratory Data:  Results for HIMANSHU AL (MRN [id number]) as of 4/1/2020 07:32   3/30/2020 14:43   Sodium 137   Potassium 3.5   Chloride 107   Carbon Dioxide 23   Urea Nitrogen 10   Creatinine 0.54   GFR Estimate >90   GFR Estimate If Black >90   Calcium 8.8   Anion Gap 8   Albumin 4.0   Protein Total 7.6   Bilirubin Total 4.0 (H)   Alkaline Phosphatase 88   ALT 90 (H)   AST 88 (H)   Ferritin 15,538 (H)   Glucose 104 (H)   WBC 18.1 (H)   Hemoglobin 7.9 (L)   Hematocrit 22.5 (L)   Platelet Count 306   RBC Count 2.58 (L)   MCV 87   MCH 30.6   MCHC 35.1   RDW 21.3 (H)   Diff Method Manual Differential   % Neutrophils 87.0   % Lymphocytes 9.0   % " Monocytes 1.0   % Eosinophils 2.0   % Basophils 1.0   Nucleated RBCs 15 (H)   Absolute Neutrophil 15.7 (H)   Absolute Lymphocytes 1.6   Absolute Monocytes 0.2   Absolute Eosinophils 0.4   Absolute Basophils 0.2   Absolute Nucleated RBC 2.7   Anisocytosis Moderate   Poikilocytosis Moderate   Polychromasia Moderate   Sickle Cells Moderate   Platelet Estimate Confirming automated cell count   % Retic 28.2 (H)   Absolute Retic 727.6 (H)       Assessment and Plan:  1. Sickle Cell Disease  HgbSS, disease is complicated by stroke leading to significant cognitive delay and right UE hemiparesis, high anxiety leading to frequent pain crises on top of chronic pain syndrome, poor social structure at home with no real support, and iron overload secondary to repeated transfusions.    Recent admission for pain crisis, patient left early due to concerns about not being able to do nebulizer in hospital. She appears well today with stable vitals, however her labs with significant retic elevation and ongoing anemia with leukocytosis along with her ongoing pain indicate continued pain crisis.    Will attempt to manage in clinic with infusions this week. I will touch base with her on Friday as well.    She needs monthly transfusions per Dr. Duncan last note-not discussed with her, scheduled in April.    Her ferritin remains extremely high-continue Jadenu 900mg daily. Will hold off on increasing in setting of acute pain crisis but can adjust once resolved.    Continue ASA for post-stroke ppx. Consider neuropsych testing in the future    2. Pain Control  Chronic pain complicated by semi frequent acute pain concerns. Home regimen Oxycodone 10mg 4 tabs per day, #56 per 2 weeks, and OxyContin 10mg BID, #60 tabs x 1 month. Will hold off on adjusting home meds in setting of crisis but can discuss with Dr. Duncan.    Clinic/ED pain plan with fluids (reduced to 500ml since we are doing multiple infusions in a row to prevent fluid overload), IV  morphine 2mg every 1hr PRN.     3. Pulm  History of asthma, was having issues inpatient, now resolved back on home albuterol neb and inhaler. COVID testing inpatient was negative. Lungs clear today.    4. Psych  Anxiety/depression, continue frequent visits with myself and Dr. Duncan. Currently on Zoloft 150mg daily and tolerating well. Can go up to 200mg if needed after a few weeks. Continues to follow with her pediatric psychiatrist-did not discuss transition to new psychiatrist today.    Benadryl PRN working well for sleep.    Greater than 25 min was spent with the patient with >50% of the time spent in counseling and coordinating care.     Andrei Machado PA-C  [facility]  [address], [place] [address] [phone]

## 2020-03-30 NOTE — PROGRESS NOTES
"[facility]: Post-Discharge Note  SITUATION                                                      Admission:    Admission Date: 03/27/20   Reason for Admission: Sickle cell crisis  Discharge:   Discharge Date: 03/29/20  Discharge Diagnosis: Sickle cell crisis  Discharge Service: Hospitalist    BACKGROUND                                                      Jennifer Cervantes is a 20 year old female with sickle cell anemia, asthma and prior strokes who presents with constant, dull/aching pain throughout her body notably in her mid back, left leg more than right and bilateral arms that she rates as a 8/10 that started yesterday 8:00AM with some relief by oxycontin and oxycodone this evening, but was not completely resolved/still intolerable to patient.     She does mention a cough, sneezing, increased use of albuterol and  in the past two days which she ascribes to her asthma when the weather changes. It seems to have improved with increased inhalers     LMP: 2 years ago.    ASSESSMENT      Discharge Assessment  Patient reports symptoms are: Unchanged(Patient was very sleepy and stated she was \"the same as yesterday\". No new questions or concerns.)  Does the patient have all of their medications?: Yes  Does patient know what their new medications are for?: Not applicable  Does patient have a follow-up appointment scheduled?: Yes  Does patient have any other questions or concerns?: No    Post-op  Did the patient have surgery or a procedure: No    PLAN                                                      Outpatient Plan:      Follow up with primary care provider, Daya Carter, within 7 days for hospital follow- up.  No follow up labs or test are needed.      Future Appointments   Date Time Provider Department Center   4/3/2020  3:30 PM Andrei Machado PA [facility]   4/10/2020  1:00 PM Eric Duncan MD [facility]   4/13/2020  1:00 PM Shady Schulz MD [facility] "   4/14/2020 12:00 PM UC 24 ATC ONI [facility]   4/16/2020  [facility]:00 [facility]   4/16/2020  2:30 PM  MASONIC LAB DRAW ONL [facility]   4/17/2020 12:00 PM UC ONCOLOGY INFUSION [facility]   4/17/2020  1:50 PM Andrei Machado PA [facility]   4/24/2020  1:00 PM Eric Duncan MD [facility]           Naida Fonseca, CMA

## 2020-03-30 NOTE — PATIENT INSTRUCTIONS
Dear Jennifer Cervantes    Thank you for choosing [facility] ([facility]) for your infusion.  The following information is a summary of our appointment as well as important reminders.      We look forward in seeing you on your next appointment here at [facility] ([facility]).  Please don t hesitate to call us at [phone] to reschedule any of your appointments or to speak with one of the [facility] registered nurses.  It was a pleasure taking care of you today.    Sincerely,    [facility]  [facility]  [address] [place] [address]  [id number]  Phone:  [phone]

## 2020-03-31 ENCOUNTER — INFUSION THERAPY VISIT (OUTPATIENT)
Dept: INFUSION THERAPY | Facility: CLINIC | Age: 20
End: 2020-03-31
Attending: PEDIATRICS
Payer: COMMERCIAL

## 2020-03-31 ENCOUNTER — ONCOLOGY VISIT (OUTPATIENT)
Dept: ONCOLOGY | Facility: CLINIC | Age: 20
End: 2020-03-31
Attending: PEDIATRICS
Payer: COMMERCIAL

## 2020-03-31 VITALS
TEMPERATURE: 98 F | SYSTOLIC BLOOD PRESSURE: 111 MMHG | HEART RATE: 87 BPM | OXYGEN SATURATION: 98 % | DIASTOLIC BLOOD PRESSURE: 74 MMHG | RESPIRATION RATE: 14 BRPM

## 2020-03-31 VITALS
HEART RATE: 98 BPM | DIASTOLIC BLOOD PRESSURE: 63 MMHG | WEIGHT: 155.5 LBS | HEIGHT: 64 IN | TEMPERATURE: 98 F | SYSTOLIC BLOOD PRESSURE: 117 MMHG | OXYGEN SATURATION: 96 % | BODY MASS INDEX: 26.55 KG/M2 | RESPIRATION RATE: 16 BRPM

## 2020-03-31 DIAGNOSIS — D57.00 SICKLE CELL PAIN CRISIS (H): Primary | ICD-10-CM

## 2020-03-31 DIAGNOSIS — D57.00 SICKLE CELL CRISIS (H): Primary | ICD-10-CM

## 2020-03-31 PROCEDURE — 25800030 ZZH RX IP 258 OP 636: Mod: ZF | Performed by: PHYSICIAN ASSISTANT

## 2020-03-31 PROCEDURE — 96361 HYDRATE IV INFUSION ADD-ON: CPT

## 2020-03-31 PROCEDURE — 25000128 H RX IP 250 OP 636: Mod: ZF | Performed by: PEDIATRICS

## 2020-03-31 PROCEDURE — 96374 THER/PROPH/DIAG INJ IV PUSH: CPT

## 2020-03-31 PROCEDURE — G0463 HOSPITAL OUTPT CLINIC VISIT: HCPCS | Mod: ZF

## 2020-03-31 PROCEDURE — 99214 OFFICE O/P EST MOD 30 MIN: CPT | Mod: ZP | Performed by: PHYSICIAN ASSISTANT

## 2020-03-31 PROCEDURE — 96376 TX/PRO/DX INJ SAME DRUG ADON: CPT

## 2020-03-31 PROCEDURE — 25000128 H RX IP 250 OP 636: Mod: ZF | Performed by: PHYSICIAN ASSISTANT

## 2020-03-31 RX ORDER — ONDANSETRON 8 MG/1
8 TABLET, FILM COATED ORAL
Status: CANCELLED
Start: 2020-03-31

## 2020-03-31 RX ORDER — KETOROLAC TROMETHAMINE 30 MG/ML
30 INJECTION, SOLUTION INTRAMUSCULAR; INTRAVENOUS ONCE
Status: CANCELLED
Start: 2020-03-31

## 2020-03-31 RX ORDER — MORPHINE SULFATE 2 MG/ML
2 INJECTION, SOLUTION INTRAMUSCULAR; INTRAVENOUS
Status: CANCELLED
Start: 2020-03-31

## 2020-03-31 RX ORDER — HEPARIN SODIUM (PORCINE) LOCK FLUSH IV SOLN 100 UNIT/ML 100 UNIT/ML
5 SOLUTION INTRAVENOUS
Status: CANCELLED | OUTPATIENT
Start: 2020-03-31

## 2020-03-31 RX ORDER — HEPARIN SODIUM,PORCINE 10 UNIT/ML
5 VIAL (ML) INTRAVENOUS
Status: CANCELLED | OUTPATIENT
Start: 2020-03-31

## 2020-03-31 RX ORDER — MORPHINE SULFATE 2 MG/ML
2 INJECTION, SOLUTION INTRAMUSCULAR; INTRAVENOUS
Status: COMPLETED | OUTPATIENT
Start: 2020-03-31 | End: 2020-03-31

## 2020-03-31 RX ORDER — DIPHENHYDRAMINE HCL 25 MG
25 CAPSULE ORAL
Status: CANCELLED
Start: 2020-03-31

## 2020-03-31 RX ORDER — HEPARIN SODIUM (PORCINE) LOCK FLUSH IV SOLN 100 UNIT/ML 100 UNIT/ML
5 SOLUTION INTRAVENOUS
Status: DISCONTINUED | OUTPATIENT
Start: 2020-03-31 | End: 2020-03-31 | Stop reason: HOSPADM

## 2020-03-31 RX ADMIN — DEXTROSE AND SODIUM CHLORIDE 500 ML: 5; 450 INJECTION, SOLUTION INTRAVENOUS at 13:14

## 2020-03-31 RX ADMIN — MORPHINE SULFATE 2 MG: 2 INJECTION, SOLUTION INTRAMUSCULAR; INTRAVENOUS at 15:13

## 2020-03-31 RX ADMIN — MORPHINE SULFATE 2 MG: 2 INJECTION, SOLUTION INTRAMUSCULAR; INTRAVENOUS at 14:14

## 2020-03-31 RX ADMIN — Medication 5 ML: at 15:17

## 2020-03-31 RX ADMIN — MORPHINE SULFATE 2 MG: 2 INJECTION, SOLUTION INTRAMUSCULAR; INTRAVENOUS at 13:16

## 2020-03-31 ASSESSMENT — MIFFLIN-ST. JEOR: SCORE: 1455.59

## 2020-03-31 ASSESSMENT — PAIN SCALES - GENERAL: PAINLEVEL: EXTREME PAIN (9)

## 2020-03-31 NOTE — NURSING NOTE
"Oncology Rooming Note    March 31, 2020 12:21 PM   Jennifer Cervantes is a 20 year old female who presents for:    Chief Complaint   Patient presents with     Oncology Clinic Visit     [facility] RETURN- SICKLE CELL      Initial Vitals: /63 (BP Location: Left arm, Patient Position: Chair, Cuff Size: Adult Regular)   Pulse 98   Temp 98  F (36.7  C) (Oral)   Resp 16   Ht 1.626 m (5' 4.02\")   Wt 70.5 kg (155 lb 8 oz)   LMP  (LMP Unknown)   SpO2 96%   BMI 26.68 kg/m   Estimated body mass index is 26.68 kg/m  as calculated from the following:    Height as of this encounter: 1.626 m (5' 4.02\").    Weight as of this encounter: 70.5 kg (155 lb 8 oz). Body surface area is 1.78 meters squared.  Extreme Pain (9) Comment: Data Unavailable   No LMP recorded (lmp unknown). Patient has had an injection.  Allergies reviewed: Yes  Medications reviewed: Yes    Medications: Medication refills not needed today.  Pharmacy name entered into [facility]:    [facility] - [place], [place] - [address]  [id number] - [place], [place] - [address] OF [facility] [place] - [phone]    Clinical concerns: No new concerns. Andrei  was notified.      Shahriar Sheldon, MIGUEL ÁNGEL            "

## 2020-03-31 NOTE — LETTER
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 Please see Dr. Duncan's detailed note from 2/28/20 for complete hematologic history.      She was admitted 3/27/20-3/29/20 for sickle cell pain crisis, left due to her concerns about not being able to do her asthma nebulizer.    I saw her today for hematology follow-up.     Interval History:  Jennifer was seen today in clinic unaccompanied. We spent the first part of the visit discussing her recent hospitalization. She was not happy with how the nursing triage staff spoke to her on 3/27/20 and we discussed how unfortunately after hours our heme onc nurses are not staffing this line. She also was upset she had multiple room changes in the hospital and that she was not given her nebulizer. We discussed that with the COVID situation they are limiting nebulizer treatments inpatient and she expressed understanding. At the end of our discussion she seemed to feel better.    She states that her asthma symptoms are improved since returning home. She denies any SOB, chest pain, or cough. She is still having significant pain in her back, left arm, and legs. The infusion helps with this but she still feels uncomfortable today. She is taking her home meds as prescribed, and is wondering if we could go up on those in the future.     She denies fevers, headaches, dizziness, GI concerns, edema (left leg chronically larger than right after stroke), or rashes. She states she is eating and drinking well.     Current Outpatient Medications   Medication Sig Dispense  Refill     acetaminophen (TYLENOL) 325 MG tablet Take 2 tablets (650 mg) by mouth every 6 hours as needed for mild pain       albuterol (PROVENTIL) (2.5 MG/3ML) 0.083% neb solution Take 1 vial (2.5 mg) by nebulization every 6 hours as needed for shortness of breath / dyspnea or wheezing 12 mL 4     aspirin (ASA) 81 MG tablet Take 1 tablet (81 mg) by mouth daily       Budesonide-Formoterol Fumarate (SYMBICORT IN) Inhale  into the lungs.       celecoxib (CELEBREX) 100 MG capsule Take 100 mg by mouth       cyproheptadine (PERIACTIN) 4 MG tablet        deferasirox (JADENU) 180 MG tablet Take 1 tablet (180 mg) by mouth daily With two 360mg tabs for a total daily dose of 900mg       diphenhydrAMINE (BENADRYL) 25 MG capsule Take 1-2 capsules (25-50 mg) by mouth nightly as needed for sleep 60 capsule 3     EPINEPHrine (EPIPEN) 0.3 MG/0.3ML injection        erythromycin ethylsuccinate (E.E.S.) 400 MG tablet Take 1 tablet (400 mg) by mouth 3 times daily Before meals       gabapentin (NEURONTIN) 300 MG capsule Take 3 capsules (900 mg) by mouth 3 times daily       GNP SENNA-LAX 8.6 MG tablet Take 1 tablet by mouth 2 times daily as needed for constipation       hydroxyurea (HYDREA) 500 MG capsule CHEMO Take 4 capsules (2,000 mg) by mouth daily       JADENU 360 MG tablet Take 2 tablets (720 mg) by mouth daily With one 180mg tab for a total daily dose of 900mg       omeprazole (PRILOSEC) 40 MG DR capsule Take 1 capsule (40 mg) by mouth daily       ondansetron (ZOFRAN) 8 MG tablet        oxyCODONE IR (ROXICODONE) 10 MG tablet Take 1 tablet by mouth every 4-6 hours as needed for severe pain. 56 tablet 0     OXYCONTIN 10 MG 12 hr tablet Take 1 tablet (10 mg) by mouth every 12 hours 60 tablet 0     pregabalin (LYRICA) 75 MG capsule        PROAIR  (90 Base) MCG/ACT inhaler Inhale 2 puffs into the lungs every 4 hours as needed for shortness of breath / dyspnea or wheezing       sertraline (ZOLOFT) 100 MG tablet Take 1.5 tablets  "(150 mg) by mouth daily 30 tablet 0     Physical Examination:  /63 (BP Location: Left arm, Patient Position: Chair, Cuff Size: Adult Regular)   Pulse 98   Temp 98  F (36.7  C) (Oral)   Resp 16   Ht 1.626 m (5' 4.02\")   Wt 70.5 kg (155 lb 8 oz)   LMP  (LMP Unknown)   SpO2 96%   BMI 26.68 kg/m    Wt Readings from Last 10 Encounters:   03/30/20 70.2 kg (154 lb 12.8 oz)   03/28/20 69.6 kg (153 lb 8 oz)   03/13/20 68.9 kg (152 lb)   02/28/20 70.2 kg (154 lb 12.8 oz)   10/24/19 66.2 kg (146 lb)   10/10/19 71.7 kg (158 lb 1.1 oz)   09/25/19 65.4 kg (144 lb 2.9 oz)   09/05/19 65.3 kg (144 lb)   08/29/19 65.3 kg (144 lb)   06/06/19 68 kg (150 lb)     Constitutional: Well-appearing female in no acute distress.  Eyes: EOMI, PERRL. No scleral icterus.  ENT: Oral mucosa is moist without lesions or thrush.   Lymphatic: Neck is supple without cervical or supraclavicular lymphadenopathy.   Cardiovascular: Regular rate and rhythm. No murmurs, gallops, or rubs. No peripheral edema (left leg larger than right chronically per patient).  Respiratory: Clear to auscultation bilaterally. No wheezes or crackles.  Gastrointestinal: Bowel sounds present. Abdomen soft, non-tender.   Neurologic: Cranial nerves II through XII are grossly intact. Right hand with spastic hemiparesis. Walks with limp.   Skin: No rashes, petechiae, or bruising noted on exposed skin.    Laboratory Data:  Results for HIMANSHU AL (MRN [id number]) as of 4/1/2020 07:32   3/30/2020 14:43   Sodium 137   Potassium 3.5   Chloride 107   Carbon Dioxide 23   Urea Nitrogen 10   Creatinine 0.54   GFR Estimate >90   GFR Estimate If Black >90   Calcium 8.8   Anion Gap 8   Albumin 4.0   Protein Total 7.6   Bilirubin Total 4.0 (H)   Alkaline Phosphatase 88   ALT 90 (H)   AST 88 (H)   Ferritin 15,538 (H)   Glucose 104 (H)   WBC 18.1 (H)   Hemoglobin 7.9 (L)   Hematocrit 22.5 (L)   Platelet Count 306   RBC Count 2.58 (L)   MCV 87   MCH 30.6   MCHC 35.1   RDW 21.3 (H) "   Diff Method Manual Differential   % Neutrophils 87.0   % Lymphocytes 9.0   % Monocytes 1.0   % Eosinophils 2.0   % Basophils 1.0   Nucleated RBCs 15 (H)   Absolute Neutrophil 15.7 (H)   Absolute Lymphocytes 1.6   Absolute Monocytes 0.2   Absolute Eosinophils 0.4   Absolute Basophils 0.2   Absolute Nucleated RBC 2.7   Anisocytosis Moderate   Poikilocytosis Moderate   Polychromasia Moderate   Sickle Cells Moderate   Platelet Estimate Confirming automated cell count   % Retic 28.2 (H)   Absolute Retic 727.6 (H)       Assessment and Plan:  1. Sickle Cell Disease  HgbSS, disease is complicated by stroke leading to significant cognitive delay and right UE hemiparesis, high anxiety leading to frequent pain crises on top of chronic pain syndrome, poor social structure at home with no real support, and iron overload secondary to repeated transfusions.    Recent admission for pain crisis, patient left early due to concerns about not being able to do nebulizer in hospital. She appears well today with stable vitals, however her labs with significant retic elevation and ongoing anemia with leukocytosis along with her ongoing pain indicate continued pain crisis.    Will attempt to manage in clinic with infusions this week. I will touch base with her on Friday as well.    She needs monthly transfusions per Dr. Duncan last note-not discussed with her, scheduled in April.    Her ferritin remains extremely high-continue Jadenu 900mg daily. Will hold off on increasing in setting of acute pain crisis but can adjust once resolved.    Continue ASA for post-stroke ppx. Consider neuropsych testing in the future    2. Pain Control  Chronic pain complicated by semi frequent acute pain concerns. Home regimen Oxycodone 10mg 4 tabs per day, #56 per 2 weeks, and OxyContin 10mg BID, #60 tabs x 1 month. Will hold off on adjusting home meds in setting of crisis but can discuss with Dr. Duncan.    Clinic/ED pain plan with fluids (reduced to  500ml since we are doing multiple infusions in a row to prevent fluid overload), IV morphine 2mg every 1hr PRN.     3. Pulm  History of asthma, was having issues inpatient, now resolved back on home albuterol neb and inhaler. COVID testing inpatient was negative. Lungs clear today.    4. Psych  Anxiety/depression, continue frequent visits with myself and Dr. Duncan. Currently on Zoloft 150mg daily and tolerating well. Can go up to 200mg if needed after a few weeks. Continues to follow with her pediatric psychiatrist-did not discuss transition to new psychiatrist today.    Benadryl PRN working well for sleep.    Greater than 25 min was spent with the patient with >50% of the time spent in counseling and coordinating care.     Andrei Machado PA-C  [facility]  [address] [place] [address]  [phone]

## 2020-03-31 NOTE — Clinical Note
3/31/2020       RE: Jennifer Cervantes  [address]  [facility] [address]     Dear Colleague,    Thank you for referring your patient, Jeninfer Cervantes, to the [facility]. Please see a copy of my visit note below.    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 Please see Dr. Duncan's detailed note from 2/28/20 for complete hematologic history.      She was admitted 3/27/20-3/29/20 for sickle cell pain crisis, left due to her concerns about not being able to do her asthma nebulizer.    I saw her today for hematology follow-up.     Interval History:      Review of Systems:  Patient denies fevers, chills, night sweats, unexplained weight changes, headaches, dizziness, vision or hearing changes, new lumps or bumps, chest pain, shortness of breath, cough, abdominal pain, nausea, vomiting, changes to bowel or bladder, swelling of extremities, bleeding issues, or rash.    Current Outpatient Medications   Medication Sig Dispense Refill     acetaminophen (TYLENOL) 325 MG tablet Take 2 tablets (650 mg) by mouth every 6 hours as needed for mild pain       albuterol (PROVENTIL) (2.5 MG/3ML) 0.083% neb solution Take 1 vial (2.5 mg) by nebulization every 6 hours as needed for shortness of breath / dyspnea or wheezing 12 mL 4     aspirin (ASA) 81 MG tablet Take 1 tablet (81 mg) by mouth daily       Budesonide-Formoterol Fumarate (SYMBICORT IN) Inhale  into the lungs.       celecoxib (CELEBREX) 100 MG capsule Take 100 mg by mouth       cyproheptadine (PERIACTIN) 4 MG tablet        deferasirox (JADENU) 180 MG tablet Take 1 tablet (180 mg) by mouth daily With two 360mg  "tabs for a total daily dose of 900mg       diphenhydrAMINE (BENADRYL) 25 MG capsule Take 1-2 capsules (25-50 mg) by mouth nightly as needed for sleep 60 capsule 3     EPINEPHrine (EPIPEN) 0.3 MG/0.3ML injection        erythromycin ethylsuccinate (E.E.S.) 400 MG tablet Take 1 tablet (400 mg) by mouth 3 times daily Before meals       gabapentin (NEURONTIN) 300 MG capsule Take 3 capsules (900 mg) by mouth 3 times daily       GNP SENNA-LAX 8.6 MG tablet Take 1 tablet by mouth 2 times daily as needed for constipation       hydroxyurea (HYDREA) 500 MG capsule CHEMO Take 4 capsules (2,000 mg) by mouth daily       JADENU 360 MG tablet Take 2 tablets (720 mg) by mouth daily With one 180mg tab for a total daily dose of 900mg       omeprazole (PRILOSEC) 40 MG DR capsule Take 1 capsule (40 mg) by mouth daily       ondansetron (ZOFRAN) 8 MG tablet        oxyCODONE IR (ROXICODONE) 10 MG tablet Take 1 tablet by mouth every 4-6 hours as needed for severe pain. 56 tablet 0     OXYCONTIN 10 MG 12 hr tablet Take 1 tablet (10 mg) by mouth every 12 hours 60 tablet 0     pregabalin (LYRICA) 75 MG capsule        PROAIR  (90 Base) MCG/ACT inhaler Inhale 2 puffs into the lungs every 4 hours as needed for shortness of breath / dyspnea or wheezing       sertraline (ZOLOFT) 100 MG tablet Take 1.5 tablets (150 mg) by mouth daily 30 tablet 0     Physical Examination:  /63 (BP Location: Left arm, Patient Position: Chair, Cuff Size: Adult Regular)   Pulse 98   Temp 98  F (36.7  C) (Oral)   Resp 16   Ht 1.626 m (5' 4.02\")   Wt 70.5 kg (155 lb 8 oz)   LMP  (LMP Unknown)   SpO2 96%   BMI 26.68 kg/m    Wt Readings from Last 10 Encounters:   03/30/20 70.2 kg (154 lb 12.8 oz)   03/28/20 69.6 kg (153 lb 8 oz)   03/13/20 68.9 kg (152 lb)   02/28/20 70.2 kg (154 lb 12.8 oz)   10/24/19 66.2 kg (146 lb)   10/10/19 71.7 kg (158 lb 1.1 oz)   09/25/19 65.4 kg (144 lb 2.9 oz)   09/05/19 65.3 kg (144 lb)   08/29/19 65.3 kg (144 lb)   06/06/19 " 68 kg (150 lb)     Constitutional: Well-appearing female in no acute distress.  Eyes: EOMI, PERRL. No scleral icterus.  ENT: Oral mucosa is moist without lesions or thrush.   Lymphatic: Neck is supple without cervical or supraclavicular lymphadenopathy. No axillary lymphadenopathy.  Cardiovascular: Regular rate and rhythm. No murmurs, gallops, or rubs. No peripheral edema.  Respiratory: Clear to auscultation bilaterally. No wheezes or crackles.  Gastrointestinal: Bowel sounds present. Abdomen soft, non-tender. No palpable hepatosplenomegaly or masses.   Neurologic: Cranial nerves II through XII are grossly intact.  Skin: No rashes, petechiae, or bruising noted on exposed skin.    Laboratory Data:        Assessment and Plan:          Andrei Machado PA-C  [facility] [address] [id number] [phone]      Again, thank you for allowing me to participate in the care of your patient.      Sincerely,    RONALDO Allan

## 2020-03-31 NOTE — PROGRESS NOTES
Per RONALDO Allan- patient to have fluids/pain meds again on Thursday and Friday of this week. Staff message sent to scheduling team to contact patient with appts. Patient updated.     Tricia Adame RN, BSN

## 2020-03-31 NOTE — PROGRESS NOTES
Nursing Note  Jennifer Cervantes presents today to [facility] for:   Chief Complaint   Patient presents with     Infusion     IV Fluids, pain meds, meds     During today's [facility] appointment, orders from Dr Duncan were completed.  Frequency: once    Progress note:  Patient identification verified by name and date of birth.  Assessment completed.  Vitals recorded in Doc Flowsheets.  Patient was provided with education regarding medication/procedure and possible side effects.  Patient verbalized understanding.     present during visit today: Not Applicable.    Treatment Conditions: Non-applicable.    Premedications: were not ordered.    Drug Waste Record: No    Infusion length and rate:  infusion given over approximately 2 hours    Labs: were not ordered for this appointment.    Vascular access: port accessed today.    Post Infusion Assessment:  Patient tolerated infusion without incident.     Discharge Plan:   Follow up plan of care with: ordering provider as scheduled.  Discharge instructions were reviewed with patient.  Patient/representative verbalized understanding of discharge instructions and all questions answered.  Patient discharged from [facility] in stable condition.    Earnestine Dutton RN    Administrations This Visit     dextrose 5% and 0.45% NaCl BOLUS     Admin Date  03/31/2020 Action  New Bag Dose  500 mL Rate  250 mL/hr Route  Intravenous Administered By  Earnestine Dutton RN          heparin 100 UNIT/ML injection 5 mL     Admin Date  03/31/2020 Action  Given Dose  5 mL Route  Intracatheter Administered By  Earnestine Dutton RN          morphine (PF) injection 2 mg     Admin Date  03/31/2020 Action  Given Dose  2 mg Route  Intravenous Administered By  Earnestine Dutton RN           Admin Date  03/31/2020 Action  Given Dose  2 mg Route  Intravenous Administered By  Earnestine Dutton RN            Admin Date  03/31/2020 Action  Given Dose  2 mg Route  Intravenous Administered By  Earnestine Dutton RN                /74   Pulse 87   Temp 98  F (36.7  C) (Oral)   Resp 14   LMP  (LMP Unknown)   SpO2 98%

## 2020-04-01 ENCOUNTER — INFUSION THERAPY VISIT (OUTPATIENT)
Dept: ONCOLOGY | Facility: CLINIC | Age: 20
End: 2020-04-01
Attending: PEDIATRICS
Payer: COMMERCIAL

## 2020-04-01 VITALS
HEART RATE: 83 BPM | RESPIRATION RATE: 16 BRPM | OXYGEN SATURATION: 95 % | DIASTOLIC BLOOD PRESSURE: 80 MMHG | SYSTOLIC BLOOD PRESSURE: 118 MMHG | TEMPERATURE: 98.6 F

## 2020-04-01 DIAGNOSIS — D57.00 SICKLE CELL PAIN CRISIS (H): Primary | ICD-10-CM

## 2020-04-01 PROCEDURE — 96374 THER/PROPH/DIAG INJ IV PUSH: CPT

## 2020-04-01 PROCEDURE — 25000128 H RX IP 250 OP 636: Mod: ZF | Performed by: PHYSICIAN ASSISTANT

## 2020-04-01 PROCEDURE — 25000128 H RX IP 250 OP 636: Mod: ZF | Performed by: PEDIATRICS

## 2020-04-01 PROCEDURE — 25800030 ZZH RX IP 258 OP 636: Mod: ZF | Performed by: PHYSICIAN ASSISTANT

## 2020-04-01 PROCEDURE — 96376 TX/PRO/DX INJ SAME DRUG ADON: CPT

## 2020-04-01 PROCEDURE — 96361 HYDRATE IV INFUSION ADD-ON: CPT

## 2020-04-01 RX ORDER — HEPARIN SODIUM (PORCINE) LOCK FLUSH IV SOLN 100 UNIT/ML 100 UNIT/ML
5 SOLUTION INTRAVENOUS
Status: CANCELLED | OUTPATIENT
Start: 2020-04-01

## 2020-04-01 RX ORDER — MORPHINE SULFATE 2 MG/ML
2 INJECTION, SOLUTION INTRAMUSCULAR; INTRAVENOUS
Status: COMPLETED | OUTPATIENT
Start: 2020-04-01 | End: 2020-04-01

## 2020-04-01 RX ORDER — DIPHENHYDRAMINE HCL 25 MG
25 CAPSULE ORAL
Status: CANCELLED
Start: 2020-04-01

## 2020-04-01 RX ORDER — HEPARIN SODIUM,PORCINE 10 UNIT/ML
5 VIAL (ML) INTRAVENOUS
Status: CANCELLED | OUTPATIENT
Start: 2020-04-01

## 2020-04-01 RX ORDER — MORPHINE SULFATE 2 MG/ML
2 INJECTION, SOLUTION INTRAMUSCULAR; INTRAVENOUS
Status: CANCELLED
Start: 2020-04-01

## 2020-04-01 RX ORDER — HEPARIN SODIUM (PORCINE) LOCK FLUSH IV SOLN 100 UNIT/ML 100 UNIT/ML
5 SOLUTION INTRAVENOUS
Status: DISCONTINUED | OUTPATIENT
Start: 2020-04-01 | End: 2020-04-01 | Stop reason: HOSPADM

## 2020-04-01 RX ORDER — ONDANSETRON 8 MG/1
8 TABLET, FILM COATED ORAL
Status: CANCELLED
Start: 2020-04-01

## 2020-04-01 RX ADMIN — MORPHINE SULFATE 2 MG: 2 INJECTION, SOLUTION INTRAMUSCULAR; INTRAVENOUS at 09:54

## 2020-04-01 RX ADMIN — MORPHINE SULFATE 2 MG: 2 INJECTION, SOLUTION INTRAMUSCULAR; INTRAVENOUS at 08:55

## 2020-04-01 RX ADMIN — MORPHINE SULFATE 2 MG: 2 INJECTION, SOLUTION INTRAMUSCULAR; INTRAVENOUS at 10:57

## 2020-04-01 RX ADMIN — DEXTROSE AND SODIUM CHLORIDE 500 ML: 5; 450 INJECTION, SOLUTION INTRAVENOUS at 08:53

## 2020-04-01 RX ADMIN — Medication 5 ML: at 11:13

## 2020-04-01 ASSESSMENT — PAIN SCALES - GENERAL: PAINLEVEL: WORST PAIN (10)

## 2020-04-01 NOTE — PROGRESS NOTES
Infusion Nursing Note:  Jennifer Cervantes presents today for IVF and pain meds.    Patient seen by provider today: No   present during visit today: Not Applicable.    Note: Pt assessed upon arrival to infusion suite. Pt denies fever, chills, SOB cold or other signs/symptoms of infection. Pt states she does have an occasional cough r/t asthma, but this is not new or changed. Pt rating pain in back 10/10 on arrival after 3 doses of morphine and heat pack, pain decreased to 8/10. Patient states she feels comfortable going home with this level of pain and will return tomorrow.     Intravenous Access:  Implanted Port.    Post Infusion Assessment:  Patient tolerated infusion without incident.  Blood return noted pre and post infusion.  Site patent and intact, free from redness, edema or discomfort.  No evidence of extravasations.  Access discontinued per protocol.     Discharge Plan:   Discharge instructions reviewed with: Patient.  Declined Copy of AVS.  Patient will return 4/2/20 for next appointment. Scheduling working on time and will call patient after scheduled. Patient aware.   Patient discharged in stable condition accompanied by: self.  Departure Mode: Ambulatory.    Farhana Dias RN

## 2020-04-02 ENCOUNTER — INFUSION THERAPY VISIT (OUTPATIENT)
Dept: INFUSION THERAPY | Facility: CLINIC | Age: 20
End: 2020-04-02
Attending: PEDIATRICS
Payer: COMMERCIAL

## 2020-04-02 VITALS
DIASTOLIC BLOOD PRESSURE: 75 MMHG | HEART RATE: 84 BPM | SYSTOLIC BLOOD PRESSURE: 123 MMHG | RESPIRATION RATE: 16 BRPM | TEMPERATURE: 98.3 F | OXYGEN SATURATION: 95 %

## 2020-04-02 DIAGNOSIS — D57.00 SICKLE CELL PAIN CRISIS (H): Primary | ICD-10-CM

## 2020-04-02 PROCEDURE — 96361 HYDRATE IV INFUSION ADD-ON: CPT

## 2020-04-02 PROCEDURE — 25000128 H RX IP 250 OP 636: Mod: ZF | Performed by: PEDIATRICS

## 2020-04-02 PROCEDURE — 96374 THER/PROPH/DIAG INJ IV PUSH: CPT

## 2020-04-02 PROCEDURE — 25800030 ZZH RX IP 258 OP 636: Mod: ZF | Performed by: PHYSICIAN ASSISTANT

## 2020-04-02 PROCEDURE — 96376 TX/PRO/DX INJ SAME DRUG ADON: CPT

## 2020-04-02 PROCEDURE — 25000128 H RX IP 250 OP 636: Mod: ZF | Performed by: PHYSICIAN ASSISTANT

## 2020-04-02 RX ORDER — HEPARIN SODIUM (PORCINE) LOCK FLUSH IV SOLN 100 UNIT/ML 100 UNIT/ML
5 SOLUTION INTRAVENOUS
Status: DISCONTINUED | OUTPATIENT
Start: 2020-04-02 | End: 2020-04-02 | Stop reason: HOSPADM

## 2020-04-02 RX ORDER — HEPARIN SODIUM,PORCINE 10 UNIT/ML
5 VIAL (ML) INTRAVENOUS
Status: CANCELLED | OUTPATIENT
Start: 2020-04-02

## 2020-04-02 RX ORDER — HEPARIN SODIUM (PORCINE) LOCK FLUSH IV SOLN 100 UNIT/ML 100 UNIT/ML
5 SOLUTION INTRAVENOUS
Status: CANCELLED | OUTPATIENT
Start: 2020-04-02

## 2020-04-02 RX ORDER — MORPHINE SULFATE 2 MG/ML
2 INJECTION, SOLUTION INTRAMUSCULAR; INTRAVENOUS
Status: CANCELLED
Start: 2020-04-02

## 2020-04-02 RX ORDER — MORPHINE SULFATE 2 MG/ML
2 INJECTION, SOLUTION INTRAMUSCULAR; INTRAVENOUS
Status: DISCONTINUED | OUTPATIENT
Start: 2020-04-02 | End: 2020-04-02 | Stop reason: HOSPADM

## 2020-04-02 RX ORDER — ONDANSETRON 8 MG/1
8 TABLET, FILM COATED ORAL
Status: CANCELLED
Start: 2020-04-02

## 2020-04-02 RX ORDER — DIPHENHYDRAMINE HCL 25 MG
25 CAPSULE ORAL
Status: CANCELLED
Start: 2020-04-02

## 2020-04-02 RX ADMIN — MORPHINE SULFATE 2 MG: 2 INJECTION, SOLUTION INTRAMUSCULAR; INTRAVENOUS at 11:19

## 2020-04-02 RX ADMIN — Medication 5 ML: at 11:42

## 2020-04-02 RX ADMIN — DEXTROSE AND SODIUM CHLORIDE 500 ML: 5; 450 INJECTION, SOLUTION INTRAVENOUS at 09:15

## 2020-04-02 RX ADMIN — MORPHINE SULFATE 2 MG: 2 INJECTION, SOLUTION INTRAMUSCULAR; INTRAVENOUS at 09:12

## 2020-04-02 RX ADMIN — MORPHINE SULFATE 2 MG: 2 INJECTION, SOLUTION INTRAMUSCULAR; INTRAVENOUS at 10:16

## 2020-04-02 NOTE — PROGRESS NOTES
Oncology/Hematology Visit Note  Apr 3, 2020    Reason for Visit: Follow up of sickle cell hgbSS disease    History of Present Illness: HgbSS complicated by frequent pain crises (acute and chronic components), history of stroke leading to significant cognitive delays and right upper extremity hemiparesis, iron overload 2/2 chronic transfusions as secondary ppx post-CVA, anxiety/depression. Please see Dr. Duncan's detailed note from 2/28/20 for complete hematologic history.      She was admitted 3/27/20-3/29/20 for sickle cell pain crisis, left due to her concerns about not being able to do her asthma nebulizer.    I saw her today for hematology follow-up.     Interval History:  Jennifer returns to clinic today unaccompanied. She continues to have significant pain in her back which she feels is getting worse. She also has been having on and off headaches the last couple weeks without dizziness or vision changes. She denies fevers, chest pain, SOB, cough (feels her asthma is improved), nausea, vomiting, abdominal pain, bowel or bladder concerns, edema, rashes. She is eating and drinking OK. She states the infusions help temporarily but after she gets home the pain is severe and hard to manage. She feels like she needs to be back in the hospital.    Current Outpatient Medications   Medication Sig Dispense Refill     acetaminophen (TYLENOL) 325 MG tablet Take 2 tablets (650 mg) by mouth every 6 hours as needed for mild pain       albuterol (PROVENTIL) (2.5 MG/3ML) 0.083% neb solution Take 1 vial (2.5 mg) by nebulization every 6 hours as needed for shortness of breath / dyspnea or wheezing 12 mL 4     aspirin (ASA) 81 MG tablet Take 1 tablet (81 mg) by mouth daily       Budesonide-Formoterol Fumarate (SYMBICORT IN) Inhale  into the lungs.       celecoxib (CELEBREX) 100 MG capsule Take 100 mg by mouth       cyproheptadine (PERIACTIN) 4 MG tablet        deferasirox (JADENU) 180 MG tablet Take 1 tablet (180 mg) by mouth daily  With two 360mg tabs for a total daily dose of 900mg       diphenhydrAMINE (BENADRYL) 25 MG capsule Take 1-2 capsules (25-50 mg) by mouth nightly as needed for sleep 60 capsule 3     EPINEPHrine (EPIPEN) 0.3 MG/0.3ML injection        erythromycin ethylsuccinate (E.E.S.) 400 MG tablet Take 1 tablet (400 mg) by mouth 3 times daily Before meals       gabapentin (NEURONTIN) 300 MG capsule Take 3 capsules (900 mg) by mouth 3 times daily       GNP SENNA-LAX 8.6 MG tablet Take 1 tablet by mouth 2 times daily as needed for constipation       hydroxyurea (HYDREA) 500 MG capsule CHEMO Take 4 capsules (2,000 mg) by mouth daily       JADENU 360 MG tablet Take 2 tablets (720 mg) by mouth daily With one 180mg tab for a total daily dose of 900mg       omeprazole (PRILOSEC) 40 MG DR capsule Take 1 capsule (40 mg) by mouth daily       ondansetron (ZOFRAN) 8 MG tablet        oxyCODONE IR (ROXICODONE) 10 MG tablet Take 1 tablet by mouth every 4-6 hours as needed for severe pain. 56 tablet 0     OXYCONTIN 10 MG 12 hr tablet Take 1 tablet (10 mg) by mouth every 12 hours 60 tablet 0     pregabalin (LYRICA) 75 MG capsule        PROAIR  (90 Base) MCG/ACT inhaler Inhale 2 puffs into the lungs every 4 hours as needed for shortness of breath / dyspnea or wheezing       sertraline (ZOLOFT) 100 MG tablet Take 1.5 tablets (150 mg) by mouth daily 30 tablet 0     Physical Examination:  /76 HR 93 Temp 98.6 RR 16 Pain 10/10 SpO2 95%  Wt Readings from Last 10 Encounters:   03/31/20 70.5 kg (155 lb 8 oz)   03/30/20 70.2 kg (154 lb 12.8 oz)   03/28/20 69.6 kg (153 lb 8 oz)   03/13/20 68.9 kg (152 lb)   02/28/20 70.2 kg (154 lb 12.8 oz)   10/24/19 66.2 kg (146 lb)   10/10/19 71.7 kg (158 lb 1.1 oz)   09/25/19 65.4 kg (144 lb 2.9 oz)   09/05/19 65.3 kg (144 lb)   08/29/19 65.3 kg (144 lb)     Constitutional: Well-appearing female in no acute distress.  Eyes: EOMI, PERRL. No scleral icterus.  ENT: Oral mucosa is moist without lesions or  thrush.   Lymphatic: Neck is supple without cervical or supraclavicular lymphadenopathy.   Cardiovascular: Regular rate and rhythm. No murmurs, gallops, or rubs. No peripheral edema (left leg larger than right chronically per patient).  Respiratory: Clear to auscultation bilaterally. No wheezes or crackles.  Gastrointestinal: Bowel sounds present. Abdomen soft, non-tender.   Neurologic: Cranial nerves II through XII are grossly intact. Right hand with spastic hemiparesis. Walks with limp. No focal tenderness to low back.  Skin: No rashes, petechiae, or bruising noted on exposed skin.    Laboratory Data:  Results for HIMANSHU AL (MRN [id number]) as of 4/3/2020 11:51   4/3/2020 10:20   Sodium 138   Potassium 3.6   Chloride 110 (H)   Carbon Dioxide 23   Urea Nitrogen 5 (L)   Creatinine 0.50 (L)   GFR Estimate >90   GFR Estimate If Black >90   Calcium 8.5   Anion Gap 5   Albumin 3.5   Protein Total 6.8   Bilirubin Total 2.6 (H)   Alkaline Phosphatase 72   ALT 57 (H)   AST 50 (H)   Glucose 110 (H)   WBC 12.2 (H)   Hemoglobin 7.8 (L)   Hematocrit 22.8 (L)   Platelet Count 280   RBC Count 2.52 (L)   MCV 91   MCH 31.0   MCHC 34.2   RDW 22.5 (H)   Diff Method PENDING     Retic in process.     Assessment and Plan:  1. Sickle Cell Disease  HgbSS, disease is complicated by stroke leading to significant cognitive delay and right UE hemiparesis, high anxiety leading to frequent pain crises on top of chronic pain syndrome, poor social structure at home with no real support, and iron overload secondary to repeated transfusions.    Recent admission for pain crisis, patient left early due to concerns about not being able to do nebulizer in hospital. She has been in midst of ongoing crisis this week (ongoing back pain, elevated retic) and we have been doing daily IVF and pain meds with little improvement. Will admit to hospital for uncomplicated pain crisis.    She needs monthly transfusions per Dr. Duncan last note-he plans to  discuss role of this in setting of worsening iron overload at his next visit and maybe we can space it out.    Her ferritin remains extremely high-continue Jadenu 900mg daily. Will hold off on increasing in setting of acute pain crisis but can adjust once resolved.    Continue ASA for post-stroke ppx. Does have intermittent headaches with no red flag symptoms so hold off on imagine. Consider neuropsych testing in the future    2. Pain Control  Chronic pain complicated by semi frequent acute pain concerns. Home regimen Oxycodone 10mg 4 tabs per day, #56 per 2 weeks, and OxyContin 10mg BID, #60 tabs x 1 month. Do not recommend increasing home meds.    Clinic/ED pain plan with fluids IV morphine 2mg every 1hr PRN. As above despite daily infusion appointments this week she still is in quite a bit of pain. Will admit for pain crisis.     3. Pulm  History of asthma, was having issues inpatient, now resolved back on home albuterol neb and inhaler. COVID testing inpatient was negative. Lungs clear today and denies any respiratory concerns.    4. Psych  Anxiety/depression, continue frequent visits with myself and Dr. Duncan. Currently on Zoloft 150mg daily and tolerating well. Can go up to 200mg if needed after a few weeks. Continues to follow with her pediatric psychiatrist-did not discuss transition to new psychiatrist today.    Benadryl PRN working well for sleep.    Greater than 25 min was spent with the patient with >50% of the time spent in counseling and coordinating care.     Andrei Machado PA-C  [facility]  [address] [place] [address]  [phone]

## 2020-04-02 NOTE — PROGRESS NOTES
Nursing Note  Jennifer Cervantes presents [facility] for:   Chief Complaint   Patient presents with     Infusion     IVF/ morphine     During today's [facility] appointment, orders from Andrei HIGGINS were completed.  Frequency: as needed    Progress note:  Patient identification verified by name and date of birth.  Assessment completed.  Vitals recorded in Doc Flowsheets.  Patient was provided with education regarding medication/procedure and possible side effects.  Patient verbalized understanding.     present during visit today: Not Applicable.    Treatment Conditions: Non-applicable.    Premedications: were not ordered.    Drug Waste Record: No    Infusion length and rate:  infusion given over approximately 120 minutes    Labs: were not ordered for this appointment.    Vascular access: port accessed today. Blood return noted. De-accessed today also.     Post Infusion Assessment:  Patient tolerated infusion without incident. Pain decreased slightly at discharge. Patient has another appointment tomorrow at 0930. Patient felt well enough to discharge to home (doses available did not decrease pain significantly but better than yesterday) . Has meds available at home if needed.      Discharge Plan:   Follow up plan of care with: ordering provider as scheduled.  Discharge instructions were reviewed with patient.  Patient/representative verbalized understanding of discharge instructions and all questions answered.  Patient discharged from [facility] in stable condition.    Cristela Saldaña RN       Administrations This Visit     dextrose 5% and 0.45% NaCl BOLUS     Admin Date  04/02/2020 Action  New Bag Dose  500 mL Rate  250 mL/hr Route  Intravenous Administered By  Cristela Saldaña RN          heparin 100 UNIT/ML injection 5 mL     Admin Date  04/02/2020 Action  Given Dose  5 mL Route  Intracatheter Administered  By  Cristela Saldaña RN          morphine (PF) injection 2 mg     Admin Date  04/02/2020 Action  Given Dose  2 mg Route  Intravenous Administered By  Cristela Saldaña RN           Admin Date  04/02/2020 Action  Given Dose  2 mg Route  Intravenous Administered By  Cristela Saldaña RN           Admin Date  04/02/2020 Action  Given Dose  2 mg Route  Intravenous Administered By  Cristela Saldaña RN          sodium chloride (PF) 0.9% PF flush 3-20 mL     Admin Date  04/02/2020 Action  Given Dose  20 mL Route  Intracatheter Administered By  Cristela Saldaña RN                /75   Pulse 84   Temp 98.3  F (36.8  C) (Oral)   Resp 16   LMP  (LMP Unknown)   SpO2 95%

## 2020-04-02 NOTE — PATIENT INSTRUCTIONS
Dear Jennifer Cervantes    Thank you for choosing [facility] ([facility]) for your infusion.  The following information is a summary of our appointment as well as important reminders.        We look forward in seeing you on your next appointment here at [facility] ([facility]).  Please don t hesitate to call us at [phone] to reschedule any of your appointments or to speak with one of the [facility] registered nurses.  It was a pleasure taking care of you today.    Sincerely,    [facility]  [facility]  [address] [place] [address]  [id number]  Phone:  [phone]

## 2020-04-03 ENCOUNTER — HOSPITAL ENCOUNTER (INPATIENT)
Facility: CLINIC | Age: 20
LOS: 9 days | Discharge: HOME | DRG: 812 | End: 2020-04-12
Attending: INTERNAL MEDICINE | Admitting: STUDENT IN AN ORGANIZED HEALTH CARE EDUCATION/TRAINING PROGRAM
Payer: COMMERCIAL

## 2020-04-03 ENCOUNTER — ONCOLOGY VISIT (OUTPATIENT)
Dept: ONCOLOGY | Facility: CLINIC | Age: 20
DRG: 812 | End: 2020-04-03
Attending: PEDIATRICS
Payer: COMMERCIAL

## 2020-04-03 VITALS
HEART RATE: 93 BPM | TEMPERATURE: 98.6 F | RESPIRATION RATE: 16 BRPM | SYSTOLIC BLOOD PRESSURE: 120 MMHG | DIASTOLIC BLOOD PRESSURE: 76 MMHG | OXYGEN SATURATION: 95 %

## 2020-04-03 DIAGNOSIS — D57.00 SICKLE CELL PAIN CRISIS (H): Primary | ICD-10-CM

## 2020-04-03 DIAGNOSIS — D57.00 SICKLE CELL CRISIS (H): Primary | ICD-10-CM

## 2020-04-03 DIAGNOSIS — D57.1 HB-SS DISEASE WITHOUT CRISIS (H): ICD-10-CM

## 2020-04-03 LAB
ALBUMIN SERPL-MCNC: 3.5 G/DL (ref 3.4–5)
ALP SERPL-CCNC: 72 U/L (ref 40–150)
ALT SERPL W P-5'-P-CCNC: 57 U/L (ref 0–50)
ANION GAP SERPL CALCULATED.3IONS-SCNC: 5 MMOL/L (ref 3–14)
ANISOCYTOSIS BLD QL SMEAR: ABNORMAL
AST SERPL W P-5'-P-CCNC: 50 U/L (ref 0–45)
BASOPHILS # BLD AUTO: 0 10E9/L (ref 0–0.2)
BASOPHILS NFR BLD AUTO: 0 %
BILIRUB SERPL-MCNC: 2.6 MG/DL (ref 0.2–1.3)
BUN SERPL-MCNC: 5 MG/DL (ref 7–30)
CALCIUM SERPL-MCNC: 8.5 MG/DL (ref 8.5–10.1)
CHLORIDE SERPL-SCNC: 110 MMOL/L (ref 94–109)
CO2 SERPL-SCNC: 23 MMOL/L (ref 20–32)
CREAT SERPL-MCNC: 0.5 MG/DL (ref 0.52–1.04)
DIFFERENTIAL METHOD BLD: ABNORMAL
EOSINOPHIL # BLD AUTO: 0.2 10E9/L (ref 0–0.7)
EOSINOPHIL NFR BLD AUTO: 1.8 %
ERYTHROCYTE [DISTWIDTH] IN BLOOD BY AUTOMATED COUNT: 22.5 % (ref 10–15)
GFR SERPL CREATININE-BSD FRML MDRD: >90 ML/MIN/{1.73_M2}
GLUCOSE SERPL-MCNC: 110 MG/DL (ref 70–99)
HCT VFR BLD AUTO: 22.8 % (ref 35–47)
HGB BLD-MCNC: 7.8 G/DL (ref 11.7–15.7)
LYMPHOCYTES # BLD AUTO: 2.2 10E9/L (ref 0.8–5.3)
LYMPHOCYTES NFR BLD AUTO: 17.7 %
MACROCYTES BLD QL SMEAR: PRESENT
MCH RBC QN AUTO: 31 PG (ref 26.5–33)
MCHC RBC AUTO-ENTMCNC: 34.2 G/DL (ref 31.5–36.5)
MCV RBC AUTO: 91 FL (ref 78–100)
MONOCYTES # BLD AUTO: 0.2 10E9/L (ref 0–1.3)
MONOCYTES NFR BLD AUTO: 1.8 %
NEUTROPHILS # BLD AUTO: 9.6 10E9/L (ref 1.6–8.3)
NEUTROPHILS NFR BLD AUTO: 78.7 %
NRBC # BLD AUTO: 3.9 10*3/UL
NRBC BLD AUTO-RTO: 32 /100
PLATELET # BLD AUTO: 280 10E9/L (ref 150–450)
PLATELET # BLD EST: ABNORMAL 10*3/UL
POIKILOCYTOSIS BLD QL SMEAR: ABNORMAL
POLYCHROMASIA BLD QL SMEAR: ABNORMAL
POTASSIUM SERPL-SCNC: 3.6 MMOL/L (ref 3.4–5.3)
PROT SERPL-MCNC: 6.8 G/DL (ref 6.8–8.8)
RBC # BLD AUTO: 2.52 10E12/L (ref 3.8–5.2)
RBC INCLUSIONS BLD: SLIGHT
RETICS # AUTO: 625 10E9/L (ref 25–95)
RETICS/RBC NFR AUTO: 24.8 % (ref 0.5–2)
SICKLE CELLS BLD QL SMEAR: ABNORMAL
SODIUM SERPL-SCNC: 138 MMOL/L (ref 133–144)
TARGETS BLD QL SMEAR: ABNORMAL
WBC # BLD AUTO: 12.2 10E9/L (ref 4–11)

## 2020-04-03 PROCEDURE — 25800030 ZZH RX IP 258 OP 636: Mod: ZF | Performed by: PHYSICIAN ASSISTANT

## 2020-04-03 PROCEDURE — 96376 TX/PRO/DX INJ SAME DRUG ADON: CPT

## 2020-04-03 PROCEDURE — 25000128 H RX IP 250 OP 636: Mod: ZF | Performed by: PHYSICIAN ASSISTANT

## 2020-04-03 PROCEDURE — 96374 THER/PROPH/DIAG INJ IV PUSH: CPT

## 2020-04-03 PROCEDURE — 96361 HYDRATE IV INFUSION ADD-ON: CPT

## 2020-04-03 PROCEDURE — 99214 OFFICE O/P EST MOD 30 MIN: CPT | Mod: ZP | Performed by: PHYSICIAN ASSISTANT

## 2020-04-03 PROCEDURE — 80053 COMPREHEN METABOLIC PANEL: CPT | Performed by: PEDIATRICS

## 2020-04-03 PROCEDURE — 85045 AUTOMATED RETICULOCYTE COUNT: CPT | Performed by: PEDIATRICS

## 2020-04-03 PROCEDURE — 12000001 ZZH R&B MED SURG/OB UMMC

## 2020-04-03 PROCEDURE — 25000132 ZZH RX MED GY IP 250 OP 250 PS 637: Performed by: STUDENT IN AN ORGANIZED HEALTH CARE EDUCATION/TRAINING PROGRAM

## 2020-04-03 PROCEDURE — 99223 1ST HOSP IP/OBS HIGH 75: CPT | Mod: AI | Performed by: STUDENT IN AN ORGANIZED HEALTH CARE EDUCATION/TRAINING PROGRAM

## 2020-04-03 PROCEDURE — 25800030 ZZH RX IP 258 OP 636: Performed by: STUDENT IN AN ORGANIZED HEALTH CARE EDUCATION/TRAINING PROGRAM

## 2020-04-03 PROCEDURE — 25000128 H RX IP 250 OP 636: Mod: ZF | Performed by: PEDIATRICS

## 2020-04-03 PROCEDURE — 25000128 H RX IP 250 OP 636: Performed by: STUDENT IN AN ORGANIZED HEALTH CARE EDUCATION/TRAINING PROGRAM

## 2020-04-03 PROCEDURE — 85025 COMPLETE CBC W/AUTO DIFF WBC: CPT | Performed by: PEDIATRICS

## 2020-04-03 RX ORDER — LIDOCAINE 40 MG/G
CREAM TOPICAL
Status: DISCONTINUED | OUTPATIENT
Start: 2020-04-03 | End: 2020-04-12 | Stop reason: HOSPADM

## 2020-04-03 RX ORDER — GABAPENTIN 300 MG/1
900 CAPSULE ORAL 3 TIMES DAILY
Status: DISCONTINUED | OUTPATIENT
Start: 2020-04-03 | End: 2020-04-12 | Stop reason: HOSPADM

## 2020-04-03 RX ORDER — ONDANSETRON 8 MG/1
8 TABLET, FILM COATED ORAL
Status: CANCELLED
Start: 2020-04-03

## 2020-04-03 RX ORDER — PREGABALIN 75 MG/1
75 CAPSULE ORAL DAILY
Status: DISCONTINUED | OUTPATIENT
Start: 2020-04-03 | End: 2020-04-03

## 2020-04-03 RX ORDER — HEPARIN SODIUM (PORCINE) LOCK FLUSH IV SOLN 100 UNIT/ML 100 UNIT/ML
5 SOLUTION INTRAVENOUS
Status: DISCONTINUED | OUTPATIENT
Start: 2020-04-03 | End: 2020-04-03 | Stop reason: HOSPADM

## 2020-04-03 RX ORDER — SENNOSIDES 8.6 MG
1 TABLET ORAL 2 TIMES DAILY PRN
Status: DISCONTINUED | OUTPATIENT
Start: 2020-04-03 | End: 2020-04-12 | Stop reason: HOSPADM

## 2020-04-03 RX ORDER — NALOXONE HYDROCHLORIDE 0.4 MG/ML
.1-.4 INJECTION, SOLUTION INTRAMUSCULAR; INTRAVENOUS; SUBCUTANEOUS
Status: DISCONTINUED | OUTPATIENT
Start: 2020-04-03 | End: 2020-04-12 | Stop reason: HOSPADM

## 2020-04-03 RX ORDER — MORPHINE SULFATE 2 MG/ML
2 INJECTION, SOLUTION INTRAMUSCULAR; INTRAVENOUS
Status: DISCONTINUED | OUTPATIENT
Start: 2020-04-03 | End: 2020-04-03 | Stop reason: HOSPADM

## 2020-04-03 RX ORDER — MORPHINE SULFATE 2 MG/ML
1 INJECTION, SOLUTION INTRAMUSCULAR; INTRAVENOUS ONCE
Status: COMPLETED | OUTPATIENT
Start: 2020-04-03 | End: 2020-04-03

## 2020-04-03 RX ORDER — ASPIRIN 81 MG/1
81 TABLET, CHEWABLE ORAL DAILY
Status: DISCONTINUED | OUTPATIENT
Start: 2020-04-04 | End: 2020-04-12 | Stop reason: HOSPADM

## 2020-04-03 RX ORDER — ONDANSETRON 4 MG/1
4 TABLET, ORALLY DISINTEGRATING ORAL EVERY 6 HOURS PRN
Status: DISCONTINUED | OUTPATIENT
Start: 2020-04-03 | End: 2020-04-12 | Stop reason: HOSPADM

## 2020-04-03 RX ORDER — PROCHLORPERAZINE 25 MG
25 SUPPOSITORY, RECTAL RECTAL EVERY 12 HOURS PRN
Status: DISCONTINUED | OUTPATIENT
Start: 2020-04-03 | End: 2020-04-12 | Stop reason: HOSPADM

## 2020-04-03 RX ORDER — NALOXONE HYDROCHLORIDE 0.4 MG/ML
.1-.4 INJECTION, SOLUTION INTRAMUSCULAR; INTRAVENOUS; SUBCUTANEOUS
Status: DISCONTINUED | OUTPATIENT
Start: 2020-04-03 | End: 2020-04-04

## 2020-04-03 RX ORDER — DIPHENHYDRAMINE HCL 25 MG
25 CAPSULE ORAL
Status: CANCELLED
Start: 2020-04-03

## 2020-04-03 RX ORDER — ALBUTEROL SULFATE 0.83 MG/ML
2.5 SOLUTION RESPIRATORY (INHALATION) EVERY 4 HOURS PRN
Status: DISCONTINUED | OUTPATIENT
Start: 2020-04-03 | End: 2020-04-12 | Stop reason: HOSPADM

## 2020-04-03 RX ORDER — PROCHLORPERAZINE MALEATE 5 MG
10 TABLET ORAL EVERY 6 HOURS PRN
Status: DISCONTINUED | OUTPATIENT
Start: 2020-04-03 | End: 2020-04-12 | Stop reason: HOSPADM

## 2020-04-03 RX ORDER — LANOLIN ALCOHOL/MO/W.PET/CERES
3 CREAM (GRAM) TOPICAL
Status: DISCONTINUED | OUTPATIENT
Start: 2020-04-03 | End: 2020-04-12 | Stop reason: HOSPADM

## 2020-04-03 RX ORDER — HEPARIN SODIUM (PORCINE) LOCK FLUSH IV SOLN 100 UNIT/ML 100 UNIT/ML
5 SOLUTION INTRAVENOUS
Status: CANCELLED | OUTPATIENT
Start: 2020-04-03

## 2020-04-03 RX ORDER — DEFERASIROX 180 MG/1
180 TABLET, FILM COATED ORAL DAILY
Status: DISCONTINUED | OUTPATIENT
Start: 2020-04-03 | End: 2020-04-03

## 2020-04-03 RX ORDER — ACETAMINOPHEN 325 MG/1
650 TABLET ORAL EVERY 6 HOURS PRN
Status: DISCONTINUED | OUTPATIENT
Start: 2020-04-03 | End: 2020-04-05

## 2020-04-03 RX ORDER — MORPHINE SULFATE 2 MG/ML
2 INJECTION, SOLUTION INTRAMUSCULAR; INTRAVENOUS
Status: CANCELLED
Start: 2020-04-03

## 2020-04-03 RX ORDER — ALBUTEROL SULFATE 90 UG/1
2 AEROSOL, METERED RESPIRATORY (INHALATION) EVERY 4 HOURS PRN
Status: DISCONTINUED | OUTPATIENT
Start: 2020-04-03 | End: 2020-04-03

## 2020-04-03 RX ORDER — DIPHENHYDRAMINE HCL 25 MG
25-50 CAPSULE ORAL
Status: DISCONTINUED | OUTPATIENT
Start: 2020-04-03 | End: 2020-04-12 | Stop reason: HOSPADM

## 2020-04-03 RX ORDER — SODIUM CHLORIDE, SODIUM LACTATE, POTASSIUM CHLORIDE, CALCIUM CHLORIDE 600; 310; 30; 20 MG/100ML; MG/100ML; MG/100ML; MG/100ML
INJECTION, SOLUTION INTRAVENOUS CONTINUOUS
Status: ACTIVE | OUTPATIENT
Start: 2020-04-03 | End: 2020-04-04

## 2020-04-03 RX ORDER — ONDANSETRON 2 MG/ML
4 INJECTION INTRAMUSCULAR; INTRAVENOUS EVERY 6 HOURS PRN
Status: DISCONTINUED | OUTPATIENT
Start: 2020-04-03 | End: 2020-04-12 | Stop reason: HOSPADM

## 2020-04-03 RX ORDER — HEPARIN SODIUM,PORCINE 10 UNIT/ML
5 VIAL (ML) INTRAVENOUS
Status: CANCELLED | OUTPATIENT
Start: 2020-04-03

## 2020-04-03 RX ADMIN — Medication 5 ML: at 13:03

## 2020-04-03 RX ADMIN — Medication: at 15:47

## 2020-04-03 RX ADMIN — SODIUM CHLORIDE, POTASSIUM CHLORIDE, SODIUM LACTATE AND CALCIUM CHLORIDE: 600; 310; 30; 20 INJECTION, SOLUTION INTRAVENOUS at 15:50

## 2020-04-03 RX ADMIN — MORPHINE SULFATE 1 MG: 2 INJECTION, SOLUTION INTRAMUSCULAR; INTRAVENOUS at 14:46

## 2020-04-03 RX ADMIN — FLUTICASONE FUROATE AND VILANTEROL TRIFENATATE 1 PUFF: 100; 25 POWDER RESPIRATORY (INHALATION) at 18:20

## 2020-04-03 RX ADMIN — DEXTROSE AND SODIUM CHLORIDE 500 ML: 5; 450 INJECTION, SOLUTION INTRAVENOUS at 09:30

## 2020-04-03 RX ADMIN — MORPHINE SULFATE 2 MG: 2 INJECTION, SOLUTION INTRAMUSCULAR; INTRAVENOUS at 09:30

## 2020-04-03 RX ADMIN — GABAPENTIN 900 MG: 300 CAPSULE ORAL at 17:06

## 2020-04-03 RX ADMIN — MORPHINE SULFATE 2 MG: 2 INJECTION, SOLUTION INTRAMUSCULAR; INTRAVENOUS at 10:29

## 2020-04-03 RX ADMIN — MORPHINE SULFATE 2 MG: 2 INJECTION, SOLUTION INTRAMUSCULAR; INTRAVENOUS at 11:54

## 2020-04-03 ASSESSMENT — ACTIVITIES OF DAILY LIVING (ADL)
FALL_HISTORY_WITHIN_LAST_SIX_MONTHS: NO
TOILETING: 0-->INDEPENDENT
AMBULATION: 0-->INDEPENDENT
ADLS_ACUITY_SCORE: 13
SWALLOWING: 0-->SWALLOWS FOODS/LIQUIDS WITHOUT DIFFICULTY
BATHING: 0-->INDEPENDENT
COGNITION: 0 - NO COGNITION ISSUES REPORTED
DRESS: 0-->INDEPENDENT
RETIRED_EATING: 0-->INDEPENDENT
RETIRED_COMMUNICATION: 0-->UNDERSTANDS/COMMUNICATES WITHOUT DIFFICULTY
TRANSFERRING: 0-->INDEPENDENT
ADLS_ACUITY_SCORE: 13

## 2020-04-03 ASSESSMENT — PAIN DESCRIPTION - DESCRIPTORS
DESCRIPTORS: SHOOTING
DESCRIPTORS: ACHING;CONSTANT;DISCOMFORT;SORE;SHOOTING

## 2020-04-03 ASSESSMENT — PAIN SCALES - GENERAL: PAINLEVEL: WORST PAIN (10)

## 2020-04-03 NOTE — H&P
[facility], [place]    History and Physical - Tika 5 Service        Date of Admission:  4/3/2020    Assessment & Plan   Jennifer Cervantes is a 20 year old female with history of chronic pain syndrome, sickle cell disease c/b CVA with residual R sided hemiparesis and cognitive delay with recent hospitalization 3/27-3/29 admitted for ongoing vaso-occlusive crisis.    1. Vaso-occlusive crisis   #h/o HbSS  #Mild leukocytosis  Patient was recently hospitalized 3/27-3/29 for pain management of her sickle cell crisis, but left prematurely due to inadequate asthma control. Despite daily IVF and IV pain meds, she was sent to the hospital from Hematology clinic for further pain management. Reticulocyte ct is elevated to 625; Hgb and T.bili remain stable. No e/o acute chest syndrome or infection at this time; will continue to treat with PCA per Dr. Duncan's care plan.  -Morphine PCA setup as follow:  Continuous: 1 mg/hr  PCA: 0.7 mg  Interval timing: 10 minutes  Max: 5.2 mg/hr    -Hematology consulted, appreciate recs  -Restart gabapentin, hydroxyurea PTA doses  -Continue  ml/hr x 1 L  -Regular diet  -Defer CXR given recent study (3/30) and no respiratory sx  -Holding celecoxib at this time  -Holding deferasirox 900 mg PO daily until patient brings medication to the hospital    2.Mild persistent asthma  Currently well controlled after discharge 3/29 on ICS/LABA and rescue inhaler.  -Continue budesonide-fometerol 2 puffs daily  -Albuterol neb/inhaler PRN for wheeze    3. H/O CVA  Secondary to her SCD; currently on ASA 81 mg daily for prophylaxis.  -Continue ASA 81 mg daily       Diet: Combination Diet Regular Diet Adult    Fluids:  ml/hr 1L total  DVT Prophylaxis: Low Risk/Ambulatory with no VTE prophylaxis indicated  Lopez Catheter: not present  Code Status: Full Code      Disposition Plan   Expected discharge: 2 - 3 days, recommended to prior living arrangement once adequate pain  management/ tolerating PO medications.  Entered: Ricardo Richardson MD 04/03/2020, 3:35 PM       The patient's care was discussed with the Attending Physician, Dr. Mortensen, Bedside Nurse and Patient.    Ricardo Richardson MD  [address], [place]  Pager: [phone]  Please see sticky note for cross cover information  ______________________________________________________________________    Chief Complaint   Low back pain    History is obtained from the patient    History of Present Illness   Jennifer Cervantes is a 20 year old female with history of HbSS c/b CVA w/R sided hemiparesis and cognitive delay, anxiety who is admitted for management of a sickle cell pain episode.    Patient notes having throbbing low back pain for 3-4 weeks that is resistant to her home pain regimen, prompting her to seek medical care on 3/27. During this time, patient also admits to having a unilateral, R sided HA that waxes and wanes. She states being hospitalized from 3/27-3/29 and asked to be discharged because she felt that her asthma was inadequately controlled. At that time, she was screened for COVID-19 and was not allowed to use a nebulizer, thus prompting her to request an early discharge.    Patient reports visiting the Hematology clinic 3/30 for daily IVF and IV pain meds to combat her pain crisis; however, this AM she was seen by the Hematology PA who then referred her for direct admission given the need for further pain control inpatient.    No fever, chills, CP, cough, SOB, night sweats, n/v/d, abdominal pain, dysuria, numbness, tingling.      Review of Systems    The 10 point Review of Systems is negative other than noted in the HPI or here.     Past Medical History    I have reviewed this patient's medical history and updated it with pertinent information if needed.   Past Medical History:   Diagnosis Date     Anemia      Anxiety      Bleeding disorder (H)      Blood clotting  disorder (H)      Cerebral infarction (H) 2015     Cognitive developmental delay     low IQ. 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 (H)     right hand contractures     Iron overload due to repeated red blood cell transfusions      Migraines      Oppositional defiant behavior      Uncomplicated asthma         Past Surgical History   I have reviewed this patient's surgical history and updated it with pertinent information if needed.    Past Surgical History:   Procedure Laterality Date     AS INSERT TUNNELED CV 2 CATH W/O PORT/PUMP       C BREAST REDUCTION (INCLUDES LIPO) TIER 3 Bilateral 04/23/2019     REPAIR TENDON ELBOW Right 10/2/2019    Procedure: Right Elbow Flexor Lengthening, Flexor Pronator Slide Of Wrist and Finger, Thumb Adductor Lengthening;  Surgeon: Anai Franco MD;  Location: [facility] OR     TONSILLECTOMY Bilateral 10/2/2019    Procedure: Bilateral Tonsillectomy;  Surgeon: Farhana Guy MD;  Location: [facility] OR        Social History   I have reviewed this patient's social history and updated it with pertinent information if needed. Jennifer Cervantes  reports that she has never smoked. She has never used smokeless tobacco. She reports previous alcohol use. She reports that she does not use drugs.    Family History   I have reviewed this patient's family history and updated it with pertinent information if needed.   Family History   Problem Relation Age of Onset     Sickle Cell Trait Mother      Sickle Cell Trait Father        Prior to Admission Medications   Prior to Admission Medications   Prescriptions Last Dose Informant Patient Reported? Taking?   Budesonide-Formoterol Fumarate (SYMBICORT IN) 4/3/2020 at Unknown time  Yes Yes   Sig: Inhale  into the lungs.   EPINEPHrine (EPIPEN) 0.3 MG/0.3ML injection Unknown at Unknown time  Yes No   GNP SENNA-LAX 8.6 MG  tablet 4/3/2020 at Unknown time  Yes Yes   Sig: Take 1 tablet by mouth 2 times daily as needed for constipation   JADENU 360 MG tablet 4/3/2020 at Unknown time  Yes Yes   Sig: Take 2 tablets (720 mg) by mouth daily With one 180mg tab for a total daily dose of 900mg   OXYCONTIN 10 MG 12 hr tablet 4/3/2020 at Unknown time  No Yes   Sig: Take 1 tablet (10 mg) by mouth every 12 hours   PROAIR  (90 Base) MCG/ACT inhaler 4/3/2020 at Unknown time  Yes Yes   Sig: Inhale 2 puffs into the lungs every 4 hours as needed for shortness of breath / dyspnea or wheezing   acetaminophen (TYLENOL) 325 MG tablet 4/2/2020 at Unknown time  Yes Yes   Sig: Take 2 tablets (650 mg) by mouth every 6 hours as needed for mild pain   albuterol (PROVENTIL) (2.5 MG/3ML) 0.083% neb solution Past Week at Unknown time  No Yes   Sig: Take 1 vial (2.5 mg) by nebulization every 6 hours as needed for shortness of breath / dyspnea or wheezing   aspirin (ASA) 81 MG tablet 4/3/2020 at Unknown time  Yes Yes   Sig: Take 1 tablet (81 mg) by mouth daily   celecoxib (CELEBREX) 100 MG capsule 4/3/2020 at Unknown time  Yes Yes   Sig: Take 100 mg by mouth   cyproheptadine (PERIACTIN) 4 MG tablet 4/3/2020 at Unknown time  Yes Yes   deferasirox (JADENU) 180 MG tablet 4/3/2020 at Unknown time  Yes Yes   Sig: Take 1 tablet (180 mg) by mouth daily With two 360mg tabs for a total daily dose of 900mg   diphenhydrAMINE (BENADRYL) 25 MG capsule 4/2/2020 at Unknown time  No Yes   Sig: Take 1-2 capsules (25-50 mg) by mouth nightly as needed for sleep   erythromycin ethylsuccinate (E.E.S.) 400 MG tablet Past Week at Unknown time  Yes Yes   Sig: Take 1 tablet (400 mg) by mouth 3 times daily Before meals   gabapentin (NEURONTIN) 300 MG capsule 4/3/2020 at Unknown time  No Yes   Sig: Take 3 capsules (900 mg) by mouth 3 times daily   hydroxyurea (HYDREA) 500 MG capsule CHEMO 4/3/2020 at Unknown time  Yes Yes   Sig: Take 4 capsules (2,000 mg) by mouth daily   omeprazole  (PRILOSEC) 40 MG DR capsule 4/3/2020 at Unknown time  Yes Yes   Sig: Take 1 capsule (40 mg) by mouth daily   ondansetron (ZOFRAN) 8 MG tablet Past Week at Unknown time  Yes Yes   oxyCODONE IR (ROXICODONE) 10 MG tablet 4/2/2020 at Unknown time  No Yes   Sig: Take 1 tablet by mouth every 4-6 hours as needed for severe pain.   pregabalin (LYRICA) 75 MG capsule 4/3/2020 at Unknown time  Yes Yes   sertraline (ZOLOFT) 100 MG tablet 4/3/2020 at Unknown time  Yes Yes   Sig: Take 1.5 tablets (150 mg) by mouth daily      Facility-Administered Medications: None     Allergies   Allergies   Allergen Reactions     Fish-Derived Products Hives     Seafood Hives     Contrast Dye      Diagnostic X-Ray Materials      Gadolinium        Physical Exam   Vital Signs: Temp: 96.7  F (35.9  C) Temp src: Oral BP: 112/49 Pulse: 76   Resp: 18 SpO2: 97 % O2 Device: None (Room air)    Weight: 0 lbs 0 oz    General Appearance: Laying in bed, appears as stated age, no acute distress  Eyes: Anicteric sclera. PERRL.  HEENT: NC/AT. MMM.  Respiratory: CTAB posteriorly. Non labored breathing, speaking in full sentences.  Cardiovascular: Normal rate, regular rhythm. No m/r/g.  GI: Soft, non tender and non distended.  Skin: Warm, dry. No worrisome skin lesions.  Musculoskeletal: No clubbing or cyanosis. FROM x 4 extremities.  Neurologic: CN II-XII intact. No facial droop, normal speech. R hand contracture noted.  Psychiatric: Normal mood, normal affect.    Data   Data reviewed today: I reviewed all medications, new labs and imaging results over the last 24 hours. I personally reviewed no images or EKG's today.    Recent Labs   Lab 04/03/20  1020 03/30/20  1443 03/29/20  0609   WBC 12.2* 18.1* 16.1*   HGB 7.8* 7.9* 7.6*   MCV 91 87 91    306 259    137 136   POTASSIUM 3.6 3.5 3.9   CHLORIDE 110* 107 106   CO2 23 23 24   BUN 5* 10 9   CR 0.50* 0.54 0.60   ANIONGAP 5 8 6   MICAH 8.5 8.8 8.8   * 104* 86   ALBUMIN 3.5 4.0 3.9   PROTTOTAL 6.8  7.6 7.7   BILITOTAL 2.6* 4.0* 4.2*   ALKPHOS 72 88 88   ALT 57* 90* 66*   AST 50* 88* 65*

## 2020-04-03 NOTE — PROGRESS NOTES
Admitted/transferred from:   2 RN full   skin assessment completed by Cinhtia Horton RN and Oksana Jimenez.  Skin assessment finding: skin intact, no problems   Interventions/actions: other N/A     Will continue to monitor.

## 2020-04-03 NOTE — LETTER
4/3/2020      RE: Jennifer NEWMAN Billy  [address]  [facility] [address]       Oncology/Hematology Visit Note  Apr 3, 2020    Reason for Visit: Follow up of sickle cell hgbSS disease    History of Present Illness: HgbSS complicated by frequent pain crises (acute and chronic components), history of stroke leading to significant cognitive delays and right upper extremity hemiparesis, iron overload 2/2 chronic transfusions as secondary ppx post-CVA, anxiety/depression. Please see Dr. Duncan's detailed note from 2/28/20 for complete hematologic history.      She was admitted 3/27/20-3/29/20 for sickle cell pain crisis, left due to her concerns about not being able to do her asthma nebulizer.    I saw her today for hematology follow-up.     Interval History:  Jennifer returns to clinic today unaccompanied. She continues to have significant pain in her back which she feels is getting worse. She also has been having on and off headaches the last couple weeks without dizziness or vision changes. She denies fevers, chest pain, SOB, cough (feels her asthma is improved), nausea, vomiting, abdominal pain, bowel or bladder concerns, edema, rashes. She is eating and drinking OK. She states the infusions help temporarily but after she gets home the pain is severe and hard to manage. She feels like she needs to be back in the hospital.    Current Outpatient Medications   Medication Sig Dispense Refill     acetaminophen (TYLENOL) 325 MG tablet Take 2 tablets (650 mg) by mouth every 6 hours as needed for mild pain       albuterol (PROVENTIL) (2.5 MG/3ML) 0.083% neb solution Take 1 vial (2.5 mg) by nebulization every 6 hours as needed for shortness of breath / dyspnea or wheezing 12 mL 4     aspirin (ASA) 81 MG tablet Take 1 tablet (81 mg) by mouth daily       Budesonide-Formoterol Fumarate (SYMBICORT IN) Inhale  into the lungs.       celecoxib (CELEBREX) 100 MG capsule Take 100 mg by mouth       cyproheptadine (PERIACTIN) 4 MG tablet         deferasirox (JADENU) 180 MG tablet Take 1 tablet (180 mg) by mouth daily With two 360mg tabs for a total daily dose of 900mg       diphenhydrAMINE (BENADRYL) 25 MG capsule Take 1-2 capsules (25-50 mg) by mouth nightly as needed for sleep 60 capsule 3     EPINEPHrine (EPIPEN) 0.3 MG/0.3ML injection        erythromycin ethylsuccinate (E.E.S.) 400 MG tablet Take 1 tablet (400 mg) by mouth 3 times daily Before meals       gabapentin (NEURONTIN) 300 MG capsule Take 3 capsules (900 mg) by mouth 3 times daily       GNP SENNA-LAX 8.6 MG tablet Take 1 tablet by mouth 2 times daily as needed for constipation       hydroxyurea (HYDREA) 500 MG capsule CHEMO Take 4 capsules (2,000 mg) by mouth daily       JADENU 360 MG tablet Take 2 tablets (720 mg) by mouth daily With one 180mg tab for a total daily dose of 900mg       omeprazole (PRILOSEC) 40 MG DR capsule Take 1 capsule (40 mg) by mouth daily       ondansetron (ZOFRAN) 8 MG tablet        oxyCODONE IR (ROXICODONE) 10 MG tablet Take 1 tablet by mouth every 4-6 hours as needed for severe pain. 56 tablet 0     OXYCONTIN 10 MG 12 hr tablet Take 1 tablet (10 mg) by mouth every 12 hours 60 tablet 0     pregabalin (LYRICA) 75 MG capsule        PROAIR  (90 Base) MCG/ACT inhaler Inhale 2 puffs into the lungs every 4 hours as needed for shortness of breath / dyspnea or wheezing       sertraline (ZOLOFT) 100 MG tablet Take 1.5 tablets (150 mg) by mouth daily 30 tablet 0     Physical Examination:  /76 HR 93 Temp 98.6 RR 16 Pain 10/10 SpO2 95%  Wt Readings from Last 10 Encounters:   03/31/20 70.5 kg (155 lb 8 oz)   03/30/20 70.2 kg (154 lb 12.8 oz)   03/28/20 69.6 kg (153 lb 8 oz)   03/13/20 68.9 kg (152 lb)   02/28/20 70.2 kg (154 lb 12.8 oz)   10/24/19 66.2 kg (146 lb)   10/10/19 71.7 kg (158 lb 1.1 oz)   09/25/19 65.4 kg (144 lb 2.9 oz)   09/05/19 65.3 kg (144 lb)   08/29/19 65.3 kg (144 lb)     Constitutional: Well-appearing female in no acute distress.  Eyes: EOMI,  PERRL. No scleral icterus.  ENT: Oral mucosa is moist without lesions or thrush.   Lymphatic: Neck is supple without cervical or supraclavicular lymphadenopathy.   Cardiovascular: Regular rate and rhythm. No murmurs, gallops, or rubs. No peripheral edema (left leg larger than right chronically per patient).  Respiratory: Clear to auscultation bilaterally. No wheezes or crackles.  Gastrointestinal: Bowel sounds present. Abdomen soft, non-tender.   Neurologic: Cranial nerves II through XII are grossly intact. Right hand with spastic hemiparesis. Walks with limp. No focal tenderness to low back.  Skin: No rashes, petechiae, or bruising noted on exposed skin.    Laboratory Data:  Results for HIMANSHU AL (MRN [id number]) as of 4/3/2020 11:51   4/3/2020 10:20   Sodium 138   Potassium 3.6   Chloride 110 (H)   Carbon Dioxide 23   Urea Nitrogen 5 (L)   Creatinine 0.50 (L)   GFR Estimate >90   GFR Estimate If Black >90   Calcium 8.5   Anion Gap 5   Albumin 3.5   Protein Total 6.8   Bilirubin Total 2.6 (H)   Alkaline Phosphatase 72   ALT 57 (H)   AST 50 (H)   Glucose 110 (H)   WBC 12.2 (H)   Hemoglobin 7.8 (L)   Hematocrit 22.8 (L)   Platelet Count 280   RBC Count 2.52 (L)   MCV 91   MCH 31.0   MCHC 34.2   RDW 22.5 (H)   Diff Method PENDING     Retic in process.     Assessment and Plan:  1. Sickle Cell Disease  HgbSS, disease is complicated by stroke leading to significant cognitive delay and right UE hemiparesis, high anxiety leading to frequent pain crises on top of chronic pain syndrome, poor social structure at home with no real support, and iron overload secondary to repeated transfusions.    Recent admission for pain crisis, patient left early due to concerns about not being able to do nebulizer in hospital. She has been in midst of ongoing crisis this week (ongoing back pain, elevated retic) and we have been doing daily IVF and pain meds with little improvement. Will admit to hospital for uncomplicated pain  crisis.    She needs monthly transfusions per Dr. Duncan last note-he plans to discuss role of this in setting of worsening iron overload at his next visit and maybe we can space it out.    Her ferritin remains extremely high-continue Jadenu 900mg daily. Will hold off on increasing in setting of acute pain crisis but can adjust once resolved.    Continue ASA for post-stroke ppx. Does have intermittent headaches with no red flag symptoms so hold off on imagine. Consider neuropsych testing in the future    2. Pain Control  Chronic pain complicated by semi frequent acute pain concerns. Home regimen Oxycodone 10mg 4 tabs per day, #56 per 2 weeks, and OxyContin 10mg BID, #60 tabs x 1 month. Do not recommend increasing home meds.    Clinic/ED pain plan with fluids IV morphine 2mg every 1hr PRN. As above despite daily infusion appointments this week she still is in quite a bit of pain. Will admit for pain crisis.     3. Pulm  History of asthma, was having issues inpatient, now resolved back on home albuterol neb and inhaler. COVID testing inpatient was negative. Lungs clear today and denies any respiratory concerns.    4. Psych  Anxiety/depression, continue frequent visits with myself and Dr. Duncan. Currently on Zoloft 150mg daily and tolerating well. Can go up to 200mg if needed after a few weeks. Continues to follow with her pediatric psychiatrist-did not discuss transition to new psychiatrist today.    Benadryl PRN working well for sleep.    Greater than 25 min was spent with the patient with >50% of the time spent in counseling and coordinating care.     Andrei Machado PA-C  [facility]  [address] [place] [address]  [phone]      RONALDO Allan

## 2020-04-03 NOTE — PLAN OF CARE
/49 (BP Location: Left arm)   Pulse 76   Temp 96.7  F (35.9  C) (Oral)   Resp 18   LMP  (LMP Unknown)   SpO2 97%     New admit from clinic for sickle cell crisis. A&Ox4. Flat affect. Pain managed with PCA morphine. Port infusing MIVF. R arm contracted from previous stroke. Slow to respond. No other deficits. Up ad janett. Refused to put pt gown on for now.

## 2020-04-03 NOTE — PROGRESS NOTES
Infusion Nursing Note:  Jennifer Cervantes presents today for IVF and pain meds.    Patient seen by provider today: Yes: RONALDO Ann   present during visit today: Not Applicable.    Note: Pt assessed upon arriving to infusion suite. Denies fever, chills, cough, SOB, cold or other signs/symptoms of infection. Pt rating pain 10/10 in her lower back. Andrei assessed during infusion appointment and plan for patient to be admitted to the hospital for pain control. After 3 doses of morphine, pt rating pain 9/10.     Intravenous Access:  Implanted Port.    Post Infusion Assessment:  Patient tolerated infusion without incident.  Blood return noted pre and post infusion.  Site patent and intact, free from redness, edema or discomfort.  No evidence of extravasations.  Access left accessed for hospital admit.      Discharge Plan:   Patient declined prescription refills.  Pt declined Copy of AVS. Patient will return 4/10/20 for next appointment.  Patient discharged in stable condition accompanied by: EMS.  Departure Mode: [facility] transport.     Farhana Dias RN

## 2020-04-03 NOTE — PROGRESS NOTES
[facility]  Transfer Triage Note    Date of call: 04/03/20  Time of call: 11:21 AM    Is pandemic COVID-19 a concern? NO    Reason for transfer: Patient has established care here   Diagnosis: Sickle cell pain/crisis     Outside Records: Available  Additional records requested to be faxed to [phone].    Stability of Patient: Patient is vitally stable, with no critical labs, and will likely remain stable throughout the transfer process  ICU: No    Expected Time of Arrival for Transfer: 0-8 hours    Arrival Location:  [place] [address] Phone: [phone]    Recommendations for Management and Stabilization: Not needed    Additional Comments: Pt is a 21 y/o F with hx of  sickle cell disease, prior left MCA stroke, asthma and recent admission for pain crisis and discharged per her request as she was not able to get albuterol nebs. She was ruled out for COVID19 at the time. She is being admitted from her Hematology clinic for ongoing pain crisis despite outpatient escalation of pain medications. She is otherwise afebrile, no chest pain or cough.     Jered Escobar MD

## 2020-04-04 LAB
ERYTHROCYTE [DISTWIDTH] IN BLOOD BY AUTOMATED COUNT: 22.2 % (ref 10–15)
HCT VFR BLD AUTO: 22.5 % (ref 35–47)
HGB BLD-MCNC: 7.4 G/DL (ref 11.7–15.7)
LACTATE BLD-SCNC: 0.6 MMOL/L (ref 0.7–2)
MCH RBC QN AUTO: 30.8 PG (ref 26.5–33)
MCHC RBC AUTO-ENTMCNC: 32.9 G/DL (ref 31.5–36.5)
MCV RBC AUTO: 94 FL (ref 78–100)
PLATELET # BLD AUTO: 221 10E9/L (ref 150–450)
RBC # BLD AUTO: 2.4 10E12/L (ref 3.8–5.2)
WBC # BLD AUTO: 14.4 10E9/L (ref 4–11)

## 2020-04-04 PROCEDURE — 25000132 ZZH RX MED GY IP 250 OP 250 PS 637: Performed by: STUDENT IN AN ORGANIZED HEALTH CARE EDUCATION/TRAINING PROGRAM

## 2020-04-04 PROCEDURE — 12000001 ZZH R&B MED SURG/OB UMMC

## 2020-04-04 PROCEDURE — 36415 COLL VENOUS BLD VENIPUNCTURE: CPT | Performed by: STUDENT IN AN ORGANIZED HEALTH CARE EDUCATION/TRAINING PROGRAM

## 2020-04-04 PROCEDURE — 25800030 ZZH RX IP 258 OP 636: Performed by: STUDENT IN AN ORGANIZED HEALTH CARE EDUCATION/TRAINING PROGRAM

## 2020-04-04 PROCEDURE — 25000128 H RX IP 250 OP 636: Performed by: STUDENT IN AN ORGANIZED HEALTH CARE EDUCATION/TRAINING PROGRAM

## 2020-04-04 PROCEDURE — 85027 COMPLETE CBC AUTOMATED: CPT | Performed by: STUDENT IN AN ORGANIZED HEALTH CARE EDUCATION/TRAINING PROGRAM

## 2020-04-04 PROCEDURE — 36592 COLLECT BLOOD FROM PICC: CPT | Performed by: STUDENT IN AN ORGANIZED HEALTH CARE EDUCATION/TRAINING PROGRAM

## 2020-04-04 PROCEDURE — 83605 ASSAY OF LACTIC ACID: CPT | Performed by: STUDENT IN AN ORGANIZED HEALTH CARE EDUCATION/TRAINING PROGRAM

## 2020-04-04 PROCEDURE — 99232 SBSQ HOSP IP/OBS MODERATE 35: CPT | Performed by: STUDENT IN AN ORGANIZED HEALTH CARE EDUCATION/TRAINING PROGRAM

## 2020-04-04 PROCEDURE — 99222 1ST HOSP IP/OBS MODERATE 55: CPT | Performed by: INTERNAL MEDICINE

## 2020-04-04 RX ORDER — SODIUM CHLORIDE, SODIUM LACTATE, POTASSIUM CHLORIDE, CALCIUM CHLORIDE 600; 310; 30; 20 MG/100ML; MG/100ML; MG/100ML; MG/100ML
INJECTION, SOLUTION INTRAVENOUS CONTINUOUS
Status: ACTIVE | OUTPATIENT
Start: 2020-04-04 | End: 2020-04-04

## 2020-04-04 RX ADMIN — SALINE NASAL SPRAY 1 SPRAY: 1.5 SOLUTION NASAL at 17:01

## 2020-04-04 RX ADMIN — SALINE NASAL SPRAY 1 SPRAY: 1.5 SOLUTION NASAL at 20:01

## 2020-04-04 RX ADMIN — ONDANSETRON 4 MG: 2 INJECTION INTRAMUSCULAR; INTRAVENOUS at 08:47

## 2020-04-04 RX ADMIN — OMEPRAZOLE 40 MG: 20 CAPSULE, DELAYED RELEASE ORAL at 08:36

## 2020-04-04 RX ADMIN — SODIUM CHLORIDE, POTASSIUM CHLORIDE, SODIUM LACTATE AND CALCIUM CHLORIDE: 600; 310; 30; 20 INJECTION, SOLUTION INTRAVENOUS at 00:17

## 2020-04-04 RX ADMIN — SODIUM CHLORIDE, POTASSIUM CHLORIDE, SODIUM LACTATE AND CALCIUM CHLORIDE: 600; 310; 30; 20 INJECTION, SOLUTION INTRAVENOUS at 12:10

## 2020-04-04 RX ADMIN — FLUTICASONE FUROATE AND VILANTEROL TRIFENATATE 1 PUFF: 100; 25 POWDER RESPIRATORY (INHALATION) at 08:36

## 2020-04-04 RX ADMIN — SERTRALINE HYDROCHLORIDE 150 MG: 100 TABLET ORAL at 08:36

## 2020-04-04 RX ADMIN — GABAPENTIN 900 MG: 300 CAPSULE ORAL at 08:36

## 2020-04-04 RX ADMIN — ASPIRIN 81 MG CHEWABLE TABLET 81 MG: 81 TABLET CHEWABLE at 08:36

## 2020-04-04 ASSESSMENT — ACTIVITIES OF DAILY LIVING (ADL)
ADLS_ACUITY_SCORE: 13
ADLS_ACUITY_SCORE: 14
ADLS_ACUITY_SCORE: 13
ADLS_ACUITY_SCORE: 12

## 2020-04-04 ASSESSMENT — PAIN DESCRIPTION - DESCRIPTORS
DESCRIPTORS: CONSTANT;SHOOTING
DESCRIPTORS: CONSTANT;SHOOTING

## 2020-04-04 NOTE — CONSULTS
HEMATOLOGY CONSULT    Chart and labs reviewed.  Discussed with primary team.    Jennifer is a 20-year-old woman with sickle cell disease who was admitted yesterday from clinic for ongoing sickle pain.  She had been admitted for 2 days late last week for an uncomplicated sickle pain crisis but requested discharge before her pain crisis was completely resolved as she was unable to use her nebulizer due to restrictions related to the ongoing Covid-19 pandemic.  She has been followed closely in clinic and was admitted yesterday due to ongoing pain not felt to be manageable with her outpatient regimen.    There is no report of fever, chills, cough, chest pain, or shortness of breath.  She is currently afebrile, hemodynamically stable, with oxygen saturations of 96 to 98% on room air.  CBC parameters are stable and within her recent baseline.      ASSESSMENT / PLAN:  Sickle pain crisis.  This appears to be an uncomplicated sickle pain crisis.  She has a well-established treatment plan for management of her pain, which should be followed.  There is no indication to vary from this plan at this time.    We will continue to follow daily but in an attempt to limit bedside visits during the Covid-19 pandemic, we will not be personally seeing her every day unless necessary.  Please contact us with questions or concerns.      Jose Manuel Hernandez MD  Associate Professor of Medicine  Division of Hematology, Oncology, and Transplantation  Director, [facility]

## 2020-04-04 NOTE — PROGRESS NOTES
[facility], [facility] [place] Progress Note - Hospitalist Service, Tika Licona       Date of Admission:  4/3/2020  Assessment & Plan   Jennifer Cervantes is a 20 year old female with history of chronic pain syndrome, sickle cell disease c/b CVA with residual R sided hemiparesis and cognitive delay with recent hospitalization 3/27-3/29 admitted for ongoing vaso-occlusive crisis.     1. Vaso-occlusive crisis   #h/o HbSS  #Mild leukocytosis  Patient was recently hospitalized 3/27-3/29 for pain management of her sickle cell crisis, but left prematurely due to inadequate asthma control. Despite daily IVF and IV pain meds, she was sent to the hospital from Hematology clinic for further pain management. Reticulocyte ct is elevated to 625; Hgb and T.bili remain stable. No e/o acute chest syndrome or infection at this time; will continue to treat with PCA per Dr. Duncan's care plan.  -Morphine PCA setup as follow:  Continuous: 1 mg/hr  PCA: 0.7 mg  Interval timing: 10 minutes  Max: 4.2 mg/hr  -Hematology consulted, appreciate recs  -Restart gabapentin, hydroxyurea PTA doses  -LR to TKO  -Regular diet  -Defer CXR given recent study (3/30) and no respiratory sx  -Holding celecoxib at this time  -Holding deferasirox 900 mg PO daily until patient brings medication to the hospital     2.Mild persistent asthma  Currently well controlled after discharge 3/29 on ICS/LABA and rescue inhaler.  -Continue budesonide-fometerol 2 puffs daily  -Albuterol neb/inhaler PRN for wheeze     3. H/O CVA  Secondary to her SCD; currently on ASA 81 mg daily for prophylaxis.  -Continue ASA 81 mg daily        Diet: Combination Diet Regular Diet Adult    DVT Prophylaxis: Pneumatic Compression Devices  Lopez Catheter: not present  Code Status: Full Code      Disposition Plan   Expected discharge: 2 - 3 days, recommended to prior living arrangement once adequate pain management/ tolerating PO medications.  Entered: Gutierrez Hinton  MD Balbina 04/04/2020, 11:54 AM       The patient's care was discussed with the Bedside Nurse, Patient and hematology Consultant.    Gutierrez Mortensen MD  Hospitalist Service, [address], [place]  Please see sticky note for cross cover information  ______________________________________________________________________    Interval History   Doing alright this morning. Some ongoing pain, but feeling a bit better with PCA. Pain primarily in back.  She just woke up, didn't endorse any other concerns.  Normal appetite.  Agreeable with plan.    4 point ROS otherwise negative    Data reviewed today: I reviewed all medications, new labs and imaging results over the last 24 hours. I personally reviewed .    Physical Exam   Vital Signs: Temp: 98.2  F (36.8  C) Temp src: Oral BP: 105/44 Pulse: 86   Resp: 16 SpO2: 96 % O2 Device: None (Room air)    Weight: 0 lbs 0 oz  General: alert to voice, interactive, no distress  Resp: lungs clear throughout, no wheezes or crackles  CV: rr no m/r/g  Abd:   Ext: wwp, no edema. Mild back pain to palpation throughout.    Data

## 2020-04-04 NOTE — PLAN OF CARE
Pt reporting lower back pain. Pain managed with Morphine PCA 0.7 mg/10 min and 1 mg/hr continuous. Denies nausea. Last BM 4/3, per pt report. Voiding spontaneously, not saving. Lethargic, easily arouses to voice. Oriented x4. Calls appropriately for assistance. Up with SBA; pt refused non-skid slippers when out of bed. Regular diet. VSS on room air. Pt refused capno, on continuous pulse ox.

## 2020-04-04 NOTE — PLAN OF CARE
Patient c/o back pain, Morphine pca at .7mg q10 minutes/ 1mg basal with fair relief.Nausea this am, Zofran given with relief, has not eaten anything today, no appetite.Incontinent of urine this afternoon, labile mood, declined ambulation.

## 2020-04-05 LAB
ERYTHROCYTE [DISTWIDTH] IN BLOOD BY AUTOMATED COUNT: 21.8 % (ref 10–15)
HCT VFR BLD AUTO: 20.5 % (ref 35–47)
HGB BLD-MCNC: 6.8 G/DL (ref 11.7–15.7)
MCH RBC QN AUTO: 30.8 PG (ref 26.5–33)
MCHC RBC AUTO-ENTMCNC: 33.2 G/DL (ref 31.5–36.5)
MCV RBC AUTO: 93 FL (ref 78–100)
PLATELET # BLD AUTO: 176 10E9/L (ref 150–450)
RBC # BLD AUTO: 2.21 10E12/L (ref 3.8–5.2)
RETICS # AUTO: 575.9 10E9/L (ref 25–95)
RETICS/RBC NFR AUTO: 26.1 % (ref 0.5–2)
WBC # BLD AUTO: 13.3 10E9/L (ref 4–11)

## 2020-04-05 PROCEDURE — 25000132 ZZH RX MED GY IP 250 OP 250 PS 637: Performed by: STUDENT IN AN ORGANIZED HEALTH CARE EDUCATION/TRAINING PROGRAM

## 2020-04-05 PROCEDURE — 85045 AUTOMATED RETICULOCYTE COUNT: CPT | Performed by: STUDENT IN AN ORGANIZED HEALTH CARE EDUCATION/TRAINING PROGRAM

## 2020-04-05 PROCEDURE — 36592 COLLECT BLOOD FROM PICC: CPT | Performed by: STUDENT IN AN ORGANIZED HEALTH CARE EDUCATION/TRAINING PROGRAM

## 2020-04-05 PROCEDURE — 85027 COMPLETE CBC AUTOMATED: CPT | Performed by: STUDENT IN AN ORGANIZED HEALTH CARE EDUCATION/TRAINING PROGRAM

## 2020-04-05 PROCEDURE — 25800030 ZZH RX IP 258 OP 636: Performed by: STUDENT IN AN ORGANIZED HEALTH CARE EDUCATION/TRAINING PROGRAM

## 2020-04-05 PROCEDURE — 12000001 ZZH R&B MED SURG/OB UMMC

## 2020-04-05 PROCEDURE — 99232 SBSQ HOSP IP/OBS MODERATE 35: CPT | Mod: GC | Performed by: STUDENT IN AN ORGANIZED HEALTH CARE EDUCATION/TRAINING PROGRAM

## 2020-04-05 PROCEDURE — 25000128 H RX IP 250 OP 636: Performed by: STUDENT IN AN ORGANIZED HEALTH CARE EDUCATION/TRAINING PROGRAM

## 2020-04-05 RX ORDER — HYDROXYUREA 500 MG/1
2000 CAPSULE ORAL DAILY
Status: DISCONTINUED | OUTPATIENT
Start: 2020-04-05 | End: 2020-04-12 | Stop reason: HOSPADM

## 2020-04-05 RX ORDER — ACETAMINOPHEN 325 MG/1
975 TABLET ORAL 3 TIMES DAILY
Status: DISCONTINUED | OUTPATIENT
Start: 2020-04-05 | End: 2020-04-06

## 2020-04-05 RX ADMIN — ACETAMINOPHEN 975 MG: 325 TABLET, FILM COATED ORAL at 16:47

## 2020-04-05 RX ADMIN — SODIUM CHLORIDE, POTASSIUM CHLORIDE, SODIUM LACTATE AND CALCIUM CHLORIDE 1000 ML: 600; 310; 30; 20 INJECTION, SOLUTION INTRAVENOUS at 12:09

## 2020-04-05 RX ADMIN — GABAPENTIN 900 MG: 300 CAPSULE ORAL at 09:39

## 2020-04-05 RX ADMIN — OMEPRAZOLE 40 MG: 20 CAPSULE, DELAYED RELEASE ORAL at 09:37

## 2020-04-05 RX ADMIN — Medication: at 08:37

## 2020-04-05 RX ADMIN — ACETAMINOPHEN 975 MG: 325 TABLET, FILM COATED ORAL at 19:35

## 2020-04-05 RX ADMIN — HYDROXYUREA 2000 MG: 500 CAPSULE ORAL at 12:08

## 2020-04-05 RX ADMIN — ACETAMINOPHEN 975 MG: 325 TABLET, FILM COATED ORAL at 10:33

## 2020-04-05 RX ADMIN — ASPIRIN 81 MG CHEWABLE TABLET 81 MG: 81 TABLET CHEWABLE at 09:37

## 2020-04-05 RX ADMIN — SERTRALINE HYDROCHLORIDE 150 MG: 100 TABLET ORAL at 09:37

## 2020-04-05 RX ADMIN — FLUTICASONE FUROATE AND VILANTEROL TRIFENATATE 1 PUFF: 100; 25 POWDER RESPIRATORY (INHALATION) at 09:36

## 2020-04-05 RX ADMIN — SALINE NASAL SPRAY 1 SPRAY: 1.5 SOLUTION NASAL at 16:47

## 2020-04-05 RX ADMIN — SALINE NASAL SPRAY 1 SPRAY: 1.5 SOLUTION NASAL at 19:35

## 2020-04-05 ASSESSMENT — ACTIVITIES OF DAILY LIVING (ADL)
ADLS_ACUITY_SCORE: 14

## 2020-04-05 ASSESSMENT — PAIN DESCRIPTION - DESCRIPTORS
DESCRIPTORS: ACHING
DESCRIPTORS: ACHING
DESCRIPTORS: SHARP;STABBING
DESCRIPTORS: ACHING

## 2020-04-05 NOTE — PLAN OF CARE
Dilaudid pca at .7mg ID and 1mg continuous with fair pain relief, started on scheduled Tylenol. C/O rib and lower back pain.Voiding spont, ate two puddings this shift and drank small amount of water,fluid flush infusing. Up to commode  with sba, patient appears more comfortable today.Hbg is 6.8, Dr Mortensen aware.

## 2020-04-05 NOTE — PLAN OF CARE
Assumed care from [id number].     A x O, VSS on RA. Continuous pulse ox in place. Pain somewhat managed w/ PCA morphine. Denies n/v. Regular diet, no appetite. +Flatus, no BM overnight. SBA. Resting between cares. Continue POC.

## 2020-04-05 NOTE — PROGRESS NOTES
[facility], [facility] Progress Note - Hospitalist Service, Tika Licona       Date of Admission:  4/3/2020  Assessment & Plan   Jennifer Cervantes is a 20 year old female with history of chronic pain syndrome, sickle cell disease c/b CVA with residual R sided hemiparesis and cognitive delay with recent hospitalization 3/27-3/29 admitted for ongoing vaso-occlusive crisis.     1. Vaso-occlusive crisis   #h/o HbSS  #Mild leukocytosis  Patient was recently hospitalized 3/27-3/29 for pain management of her sickle cell crisis, but left prematurely due to inadequate asthma control. Despite daily IVF and IV pain meds, she was sent to the hospital from Hematology clinic for further pain management. Reticulocyte ct is elevated to 625; Hgb and T.bili remain stable. No e/o acute chest syndrome or infection at this time; will continue to treat with PCA per Dr. Duncan's care plan.  -Morphine PCA setup as follow:  Continuous: 1 mg/hr  PCA: 0.7 mg  Interval timing: 10 minutes  Max: 4.2 mg/hr  -Hematology consulted, appreciate recs  -Restart gabapentin, hydroxyurea PTA doses  -LR to TKO  -Regular diet  -Holding celecoxib at this time  -Holding deferasirox 900 mg PO daily until patient brings medication to the hospital  -Avoid transfusions unless authorized by Hematology     2.Mild persistent asthma  Currently well controlled after discharge 3/29 on ICS/LABA and rescue inhaler.  -Continue budesonide-fometerol 2 puffs daily  -Albuterol neb/inhaler PRN for wheeze     3. H/O CVA  Secondary to her SCD; currently on ASA 81 mg daily for prophylaxis.  -Continue ASA 81 mg daily        Diet: Combination Diet Regular Diet Adult    DVT Prophylaxis: Pneumatic Compression Devices  Lopez Catheter: not present  Code Status: Full Code      Disposition Plan   Expected discharge: 2 - 3 days, recommended to prior living arrangement once adequate pain management/ tolerating PO medications.  Entered: Ricardo Richardson,  MD 04/05/2020, 9:16 AM       The patient's care was discussed with the Bedside Nurse, Patient and hematology Consultant.    Ricardo Richardson MD  Internal Medicine PGY-2  [facility] 5  [facility], [place]  Please see sticky note for cross cover information  ______________________________________________________________________    Interval History   RN notes reviewed, no acute events overnight. Pt reports new L rib pain that prompts her to sleep on her R side. Her low back pain is slightly improved compared to admission. She is able to make her needs known, is tolerating a diet with little difficulty.    No fever, chills, chest pain with breathing, cough, abd pain, n/v/d, hematuria, numbness, tingling, vision changes or HA.    4 point ROS otherwise negative    Data reviewed today: I reviewed all medications, new labs and imaging results over the last 24 hours. I personally reviewed .    Physical Exam   Vital Signs: Temp: (P) 97.2  F (36.2  C) Temp src: (P) Axillary BP: (P) 108/44 Pulse: 86 Heart Rate: (P) 95 Resp: (P) 14 SpO2: (P) 93 % O2 Device: (P) None (Room air)    Weight: 0 lbs 0 oz  General: alert to voice, interactive, no distress  Chest: Normal chest rises, no TTP over the L ribs  Resp: lungs clear throughout, no wheezes or crackles  CV: rr no m/r/g  Abd: Soft, non tender and non distended  Ext: wwp, no edema. Mild back pain to palpation throughout.    Data

## 2020-04-05 NOTE — PLAN OF CARE
AOx4, lethargic at times and calls appropriately. Up with SBA but declined ambulating halls this shannan. Cont pulse ox on; AVSS on ra. Pt reported bloody nose this shannan; providers aware and ordered Ocean spray, pt said this has helped. Reg diet, pt reports no appetite; did have vanilla pudding this shannan. Denies N/V. LBM 4/3. AUO, when pt has to urinate it is a sudden urge and sometimes wets the bed when she can't get up right aware. R arm contracted d/t previous CVA. Back pain managed with PCA Morphine; pt refused CLARIBEL. Port infusing with PCA Morphine and carrier fluid. Cont POC.

## 2020-04-06 ENCOUNTER — APPOINTMENT (OUTPATIENT)
Dept: ULTRASOUND IMAGING | Facility: CLINIC | Age: 20
DRG: 812 | End: 2020-04-06
Attending: STUDENT IN AN ORGANIZED HEALTH CARE EDUCATION/TRAINING PROGRAM
Payer: COMMERCIAL

## 2020-04-06 LAB
ALBUMIN SERPL-MCNC: 3.6 G/DL (ref 3.4–5)
ALP SERPL-CCNC: 76 U/L (ref 40–150)
ALT SERPL W P-5'-P-CCNC: 161 U/L (ref 0–50)
ANION GAP SERPL CALCULATED.3IONS-SCNC: 7 MMOL/L (ref 3–14)
ANISOCYTOSIS BLD QL SMEAR: ABNORMAL
AST SERPL W P-5'-P-CCNC: 144 U/L (ref 0–45)
BASOPHILS # BLD AUTO: 0.1 10E9/L (ref 0–0.2)
BASOPHILS NFR BLD AUTO: 1 %
BILIRUB DIRECT SERPL-MCNC: 0.8 MG/DL (ref 0–0.2)
BILIRUB SERPL-MCNC: 5 MG/DL (ref 0.2–1.3)
BUN SERPL-MCNC: 5 MG/DL (ref 7–30)
CALCIUM SERPL-MCNC: 8.9 MG/DL (ref 8.5–10.1)
CHLORIDE SERPL-SCNC: 105 MMOL/L (ref 94–109)
CO2 SERPL-SCNC: 27 MMOL/L (ref 20–32)
CREAT SERPL-MCNC: 0.57 MG/DL (ref 0.52–1.04)
DIFFERENTIAL METHOD BLD: ABNORMAL
EOSINOPHIL # BLD AUTO: 0.4 10E9/L (ref 0–0.7)
EOSINOPHIL NFR BLD AUTO: 3 %
ERYTHROCYTE [DISTWIDTH] IN BLOOD BY AUTOMATED COUNT: 23 % (ref 10–15)
GFR SERPL CREATININE-BSD FRML MDRD: >90 ML/MIN/{1.73_M2}
GLUCOSE SERPL-MCNC: 82 MG/DL (ref 70–99)
HCT VFR BLD AUTO: 19.8 % (ref 35–47)
HGB BLD-MCNC: 6.6 G/DL (ref 11.7–15.7)
LYMPHOCYTES # BLD AUTO: 1.5 10E9/L (ref 0.8–5.3)
LYMPHOCYTES NFR BLD AUTO: 10 %
MCH RBC QN AUTO: 30.6 PG (ref 26.5–33)
MCHC RBC AUTO-ENTMCNC: 33.3 G/DL (ref 31.5–36.5)
MCV RBC AUTO: 92 FL (ref 78–100)
METAMYELOCYTES # BLD: 0.1 10E9/L
METAMYELOCYTES NFR BLD MANUAL: 1 %
MONOCYTES # BLD AUTO: 0.4 10E9/L (ref 0–1.3)
MONOCYTES NFR BLD AUTO: 3 %
NEUTROPHILS # BLD AUTO: 11.9 10E9/L (ref 1.6–8.3)
NEUTROPHILS NFR BLD AUTO: 81 %
NRBC # BLD AUTO: 0.6 10*3/UL
NRBC BLD AUTO-RTO: 4 /100
PLATELET # BLD AUTO: 196 10E9/L (ref 150–450)
POIKILOCYTOSIS BLD QL SMEAR: ABNORMAL
POLYCHROMASIA BLD QL SMEAR: ABNORMAL
POTASSIUM SERPL-SCNC: 3.8 MMOL/L (ref 3.4–5.3)
PROMYELOCYTES # BLD MANUAL: 0.1 10E9/L
PROMYELOCYTES NFR BLD MANUAL: 1 %
PROT SERPL-MCNC: 6.8 G/DL (ref 6.8–8.8)
RBC # BLD AUTO: 2.16 10E12/L (ref 3.8–5.2)
RETICS # AUTO: 651.2 10E9/L (ref 25–95)
RETICS/RBC NFR AUTO: 30.2 % (ref 0.5–2)
SICKLE CELLS BLD QL SMEAR: ABNORMAL
SODIUM SERPL-SCNC: 138 MMOL/L (ref 133–144)
TARGETS BLD QL SMEAR: ABNORMAL
WBC # BLD AUTO: 14.7 10E9/L (ref 4–11)

## 2020-04-06 PROCEDURE — 25000132 ZZH RX MED GY IP 250 OP 250 PS 637: Performed by: STUDENT IN AN ORGANIZED HEALTH CARE EDUCATION/TRAINING PROGRAM

## 2020-04-06 PROCEDURE — 82248 BILIRUBIN DIRECT: CPT | Performed by: STUDENT IN AN ORGANIZED HEALTH CARE EDUCATION/TRAINING PROGRAM

## 2020-04-06 PROCEDURE — 25000128 H RX IP 250 OP 636: Performed by: STUDENT IN AN ORGANIZED HEALTH CARE EDUCATION/TRAINING PROGRAM

## 2020-04-06 PROCEDURE — 86900 BLOOD TYPING SEROLOGIC ABO: CPT | Performed by: STUDENT IN AN ORGANIZED HEALTH CARE EDUCATION/TRAINING PROGRAM

## 2020-04-06 PROCEDURE — 86901 BLOOD TYPING SEROLOGIC RH(D): CPT | Performed by: STUDENT IN AN ORGANIZED HEALTH CARE EDUCATION/TRAINING PROGRAM

## 2020-04-06 PROCEDURE — 85045 AUTOMATED RETICULOCYTE COUNT: CPT | Performed by: STUDENT IN AN ORGANIZED HEALTH CARE EDUCATION/TRAINING PROGRAM

## 2020-04-06 PROCEDURE — 36592 COLLECT BLOOD FROM PICC: CPT | Performed by: STUDENT IN AN ORGANIZED HEALTH CARE EDUCATION/TRAINING PROGRAM

## 2020-04-06 PROCEDURE — 12000001 ZZH R&B MED SURG/OB UMMC

## 2020-04-06 PROCEDURE — 85025 COMPLETE CBC W/AUTO DIFF WBC: CPT | Performed by: STUDENT IN AN ORGANIZED HEALTH CARE EDUCATION/TRAINING PROGRAM

## 2020-04-06 PROCEDURE — 86923 COMPATIBILITY TEST ELECTRIC: CPT | Performed by: STUDENT IN AN ORGANIZED HEALTH CARE EDUCATION/TRAINING PROGRAM

## 2020-04-06 PROCEDURE — 86902 BLOOD TYPE ANTIGEN DONOR EA: CPT | Performed by: STUDENT IN AN ORGANIZED HEALTH CARE EDUCATION/TRAINING PROGRAM

## 2020-04-06 PROCEDURE — 76705 ECHO EXAM OF ABDOMEN: CPT

## 2020-04-06 PROCEDURE — 84702 CHORIONIC GONADOTROPIN TEST: CPT | Performed by: STUDENT IN AN ORGANIZED HEALTH CARE EDUCATION/TRAINING PROGRAM

## 2020-04-06 PROCEDURE — 80053 COMPREHEN METABOLIC PANEL: CPT | Performed by: STUDENT IN AN ORGANIZED HEALTH CARE EDUCATION/TRAINING PROGRAM

## 2020-04-06 PROCEDURE — 86850 RBC ANTIBODY SCREEN: CPT | Performed by: STUDENT IN AN ORGANIZED HEALTH CARE EDUCATION/TRAINING PROGRAM

## 2020-04-06 PROCEDURE — 99232 SBSQ HOSP IP/OBS MODERATE 35: CPT | Mod: GC | Performed by: STUDENT IN AN ORGANIZED HEALTH CARE EDUCATION/TRAINING PROGRAM

## 2020-04-06 PROCEDURE — 99232 SBSQ HOSP IP/OBS MODERATE 35: CPT | Mod: GC | Performed by: INTERNAL MEDICINE

## 2020-04-06 RX ORDER — ACETAMINOPHEN 325 MG/1
975 TABLET ORAL EVERY 8 HOURS PRN
Status: DISCONTINUED | OUTPATIENT
Start: 2020-04-06 | End: 2020-04-12 | Stop reason: HOSPADM

## 2020-04-06 RX ORDER — DEFERASIROX 180 MG/1
180 TABLET, FILM COATED ORAL DAILY
Status: DISCONTINUED | OUTPATIENT
Start: 2020-04-06 | End: 2020-04-06

## 2020-04-06 RX ADMIN — Medication: at 20:50

## 2020-04-06 RX ADMIN — FLUTICASONE FUROATE AND VILANTEROL TRIFENATATE 1 PUFF: 100; 25 POWDER RESPIRATORY (INHALATION) at 08:25

## 2020-04-06 RX ADMIN — GABAPENTIN 900 MG: 300 CAPSULE ORAL at 10:22

## 2020-04-06 RX ADMIN — HYDROXYUREA 2000 MG: 500 CAPSULE ORAL at 10:24

## 2020-04-06 RX ADMIN — OMEPRAZOLE 40 MG: 20 CAPSULE, DELAYED RELEASE ORAL at 10:23

## 2020-04-06 RX ADMIN — SERTRALINE HYDROCHLORIDE 150 MG: 100 TABLET ORAL at 10:27

## 2020-04-06 RX ADMIN — ACETAMINOPHEN 975 MG: 325 TABLET, FILM COATED ORAL at 10:23

## 2020-04-06 RX ADMIN — Medication: at 14:08

## 2020-04-06 RX ADMIN — ONDANSETRON 4 MG: 2 INJECTION INTRAMUSCULAR; INTRAVENOUS at 08:23

## 2020-04-06 RX ADMIN — ASPIRIN 81 MG CHEWABLE TABLET 81 MG: 81 TABLET CHEWABLE at 10:27

## 2020-04-06 RX ADMIN — GABAPENTIN 900 MG: 300 CAPSULE ORAL at 20:51

## 2020-04-06 ASSESSMENT — PAIN DESCRIPTION - DESCRIPTORS: DESCRIPTORS: SHARP;STABBING;CONSTANT

## 2020-04-06 ASSESSMENT — ACTIVITIES OF DAILY LIVING (ADL)
ADLS_ACUITY_SCORE: 14

## 2020-04-06 NOTE — PROGRESS NOTES
[facility], [place]    Progress Note - Tika 3 Service        Date of Admission:  4/3/2020    Assessment & Plan      Jennifer Cervantes is a 20 year old female with history of chronic pain syndrome, sickle cell disease c/b CVA with residual R sided hemiparesis and cognitive delay with recent hospitalization 3/27-3/29 admitted for persistent sickle cell crisis    Changes:  - LFT rising, bili up to 5. Will fractionate bili and obtain RUQUS   - Will d/w heme re: 1. Transfusion (hgb 6.6 this AM) and 2. pain mgmt overnight (pt feels poorly managed pain overnight as she gets behind while resting).   - Will consider bolus as low ins   Dispo: Await improvement in pain then to home     Sickle cell vasoocclusive crisis   HbSS  Recent hospitalization 3/27 - 3/29 for same but left prematurely. On admit retic elevated to 625, hgb and tbili stable. No sign of acute checst of infectious although does have persistent mild leukocytosis   Care plan:   Morphine PCA setup as follow:  Continuous: 1 mg/hr  PCA: 0.7 mg  Interval timing: 10 minutes  Max: 4.2 mg/hr  - HOLD celecoxib   - HOLD Deferasirox   - Avoid transfusion unless discussed with and approved by heme   - LR TKO   - PO diet regular     Mild persistent asthma   Well controlled on ICS/ LABA and rescue   - Budesoiide Formoterol 2 puff BID   - Albuterol inhaler PRN     Hx of CVA 2/2 sickle cell   - ASA 81mg Qday      7Diet: Combination Diet Regular Diet Adult    Fluids: None, will consider fluid bolus   Lines: PORT  DVT Prophylaxis: Pneumatic Compression Devices  Lopez Catheter: not present  Code Status: Full Code      Disposition Plan   Expected discharge: 2 - 3 days, recommended to prior living arrangement once adequate pain management/ tolerating PO medications.  Entered: Chapis Horta MD 04/06/2020, 6:43 AM       The patient's care was discussed with the Attending Physician, Dr. Mortensen and Patient.    MD Tika Schultz  5 Service  [facility], [place]  Pager: [phone]  Please see sticky note for cross cover information  ______________________________________________________________________    Interval History   NAEO   Pt states she has more pain in mornings and feels like she gets behind overnight.   When assess pain pump usage, presses bolus doses more in AM as compared to overnight.   Denies SOB, CP, nausea, emesis.   States she has anxiety over new places, ambulating in hallway.     Data reviewed today: I reviewed all medications, new labs and imaging results over the last 24 hours. I personally reviewed no images or EKG's today.    Physical Exam   Vital Signs: Temp: 98.2  F (36.8  C) Temp src: Oral BP: 123/57   Heart Rate: 84 Resp: 16 SpO2: 92 % O2 Device: None (Room air)    Weight: 0 lbs 0 oz  General Appearance: Comfortable, R sided weakness.   Respiratory: clear lung sounds   Cardiovascular: rrr, no mrg   GI: soft. Deferred palpation   Skin: pt states

## 2020-04-06 NOTE — PLAN OF CARE
/46 (BP Location: Left arm)   Pulse 86   Temp 97.9  F (36.6  C) (Oral)   Resp 16   LMP  (LMP Unknown)   SpO2 91%     Neuro: A&Ox4.   Cardiac: SR. VSS.   Respiratory: Sating 91% on RA.  GI/: No BM. Adequate urine output.   Diet/appetite: Tolerating regular diet. Eating poorly.  Activity: Stand by assist up to chair.  Pain: At acceptable level on current regimen. On PCA Morphine as ordered.  Skin: No new deficits noted.  LDA's:Port a cath infusing NS at TKO with PCA.  New this shift : Abd U/S. The PCA pump was replaced. Hemoglobin 6.6 from 6.8, hematology is consulting, look out for new orders.   Plan: Continue with POC. Notify primary team with changes.

## 2020-04-06 NOTE — CONSULTS
Hematology Consult Note    Jennifer Cervantes MRN# [id number]   Age: 20 year old YOB: 2000     Date of Admission: 4/3/2020    Reason for consult: Sickle cell pain crisis          Assessment:     19 yo female with sickle cell disease (HbSS) complicated by frequent pain crises, history of stroke leading to significant cognitive delays and RUE hemiparesis, iron overload due to chronic transfusions, anxiety, depression, poor social support and structure at home who was admitted on 4/3 from clinic with ongoing sickle pain.     # Sickle cell disease with pain crisis  # History of stroke and cognitive delay  # Iron overload  # LFT elevation  - Recently admitted for pain, left prematurely and now readmitted for pain mainly in her back. Home regimen oxycodone 10 mg 4 tabs per day and OxyContin 10mg BID. She has been started on morphine PCA on admission. Hydroxyurea 2000 mg daily, aspirin 81 mg for post stroke prophylaxis is being continued.   - Hemoglobin dropped to 6.6 on 4/6 with LFT elevation (, , alkaline phosphatase 76 and total bilirubin 5). US abdomen was negative for any acute hepatobiliary changes.  - Ferritin was significantly elevated (15,538 on 3/30/20). She is on Jadenu 900 mg at home. She is not receiving Jadenu in-patient. Primary hematologist, Dr. Duncan considering increasing Jadenu dose.          Recommendations:   - Exchange transfusion was discussed with patient however, she wants to discuss with her mother today prior to consenting for internal jugular line.  - Daily CBC, LFTs and reticulocyte count  - Recommend restarting home Jadenu. Discussed bringing home Jadenu if it is not available in-patient.  - Continue morphine PCA, hydroxyurea 2000 mg daily, aspirin 81 mg  - Consider DVT prophylaxis after completion of procedures as patient is not ambulating    We will continue to follow.     Thank you for involving us in the care of the patient. Recommendations communicated with the  primary team.     Patient was seen and plan discussed with attending physician, , as well as primary hematologist Dr. Ross.    Taylor Mei  Hematology, Oncology & Transplantation fellow  Pager: [phone]  04/06/2020        HEMATOLOGY STAFF:  Seen with fellow, whose note reflects our joint evaluation, assessment, and plan.      Jose Manuel Hernandez MD  Associate Professor of Medicine  Division of Hematology, Oncology, and Transplantation  Director, [facility]              History of Present Illness:   Jennifer Cervantes is a 19 yo female with sickle cell disease (HbSS) complicated by frequent pain crises, history of stroke leading to significant cognitive delays and RUE hemiparesis, iron overload due to chronic transfusions, anxiety, depression who was admitted on 4/3 from clinic with ongoing sickle pain. She was recently admitted 3/27-3/29 for sickle cell pain crisis but left prematurely due to concern for not being able to nebulize in the hospital. She was seen in clinic by Andrei Machado on 4/3 and was sent for admission due to significant back pain.     Today, she endorses pain in lower back. She has been mostly sleeping in bed due to pain. She denies fever, chills, sob, cough, n/v, abdominal pain, joint pain.          Review of Systems:     14-point review of systems was otherwise negative except as noted in HPI.          Past Medical History:   Past medical history was reviewed.   Past Medical History:   Diagnosis Date     Anemia      Anxiety      Bleeding disorder (H)      Blood clotting disorder (H)      Cerebral infarction (H) 2015     Cognitive developmental delay     low IQ. 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 (H)     right hand contractures     Iron overload due to repeated red blood cell transfusions      Migraines       Oppositional defiant behavior      Uncomplicated asthma              Past Surgical History:   Past surgical history was reviewed.   Past Surgical History:   Procedure Laterality Date     AS INSERT TUNNELED CV 2 CATH W/O PORT/PUMP       C BREAST REDUCTION (INCLUDES LIPO) TIER 3 Bilateral 04/23/2019     REPAIR TENDON ELBOW Right 10/2/2019    Procedure: Right Elbow Flexor Lengthening, Flexor Pronator Slide Of Wrist and Finger, Thumb Adductor Lengthening;  Surgeon: Anai Franco MD;  Location: [facility] OR     TONSILLECTOMY Bilateral 10/2/2019    Procedure: Bilateral Tonsillectomy;  Surgeon: Farhana Guy MD;  Location: [facility] OR             Social History:   Social history was reviewed.   Social History     Tobacco Use     Smoking status: Never Smoker     Smokeless tobacco: Never Used   Substance Use Topics     Alcohol use: Not Currently     Alcohol/week: 0.0 standard drinks             Family History:   Family history was reviewed.  Family History   Problem Relation Age of Onset     Sickle Cell Trait Mother      Sickle Cell Trait Father              Allergies:     Allergies   Allergen Reactions     Fish-Derived Products Hives     Seafood Hives     Contrast Dye      Diagnostic X-Ray Materials      Gadolinium              Medications:   Medications Reviewed.   Current Facility-Administered Medications   Medication     acetaminophen (TYLENOL) tablet 975 mg     albuterol (PROVENTIL) neb solution 2.5 mg     aspirin (ASA) chewable tablet 81 mg     [START ON 4/7/2020] deferasirox (JADENU) tablet 900 mg     diphenhydrAMINE (BENADRYL) capsule 25-50 mg     fluticasone-vilanterol (BREO ELLIPTA) 100-25 MCG/INH inhaler 1 puff     gabapentin (NEURONTIN) capsule 900 mg     hydroxyurea (HYDREA) capsule 2,000 mg     lidocaine (LMX4) cream     lidocaine 1 % 0.1-1 mL     melatonin tablet 3 mg     morphine  PCA 1 mg/mL OPIOID TOLERANT     naloxone (NARCAN) injection 0.1-0.4 mg     omeprazole (priLOSEC) CR capsule 40 mg      ondansetron (ZOFRAN-ODT) ODT tab 4 mg    Or     ondansetron (ZOFRAN) injection 4 mg     prochlorperazine (COMPAZINE) tablet 10 mg    Or     prochlorperazine (COMPAZINE) Suppository 25 mg     sennosides (SENOKOT) tablet 1 tablet     sertraline (ZOLOFT) tablet 150 mg     sodium chloride (OCEAN) 0.65 % nasal spray 1 spray     sodium chloride (PF) 0.9% PF flush 3 mL     sodium chloride (PF) 0.9% PF flush 3 mL             Physical Exam:   Vitals were reviewed.  Blood pressure 112/50, pulse 89, temperature 98  F (36.7  C), temperature source Oral, resp. rate 16, SpO2 (!) 89 %, not currently breastfeeding.    Constitutional: awake, laying in bed, appears fatigued, in NAD  HEENT: MMM, EOM intact, sclerae clear and anicteric  CV: RRR, no murmurs appreciated  Resp: Clear to auscultation bilaterally, breathing comfortably on room air, no wheezes, rhonchi  Abd: Soft, non-tender, BS+, no masses appreciated  MSK:  Warm, FROM, no joint swelling, tenderness over lower back  Skin: no rash or lesions on limited exam  Neuro: CN2-12 grossly intact, no lateralizing symptoms or focal neurologic deficits  Psych: Mood and affect WNL  Access: Left chest port         Data:   I/O last 3 completed shifts:  In: 740 [P.O.:710; I.V.:30]  Out: 900 [Urine:900]    ROUTINE LABS (Last four results)  CMP  Recent Labs   Lab 04/06/20  0653 04/03/20  1020    138   POTASSIUM 3.8 3.6   CHLORIDE 105 110*   CO2 27 23   ANIONGAP 7 5   GLC 82 110*   BUN 5* 5*   CR 0.57 0.50*   GFRESTIMATED >90 >90   GFRESTBLACK >90 >90   MICAH 8.9 8.5   PROTTOTAL 6.8 6.8   ALBUMIN 3.6 3.5   BILITOTAL 5.0* 2.6*   ALKPHOS 76 72   * 50*   * 57*     CBC  Recent Labs   Lab 04/06/20  0653 04/05/20  0652 04/04/20  0638 04/03/20  1020   WBC 14.7* 13.3* 14.4* 12.2*   RBC 2.16* 2.21* 2.40* 2.52*   HGB 6.6* 6.8* 7.4* 7.8*   HCT 19.8* 20.5* 22.5* 22.8*   MCV 92 93 94 91   MCH 30.6 30.8 30.8 31.0   MCHC 33.3 33.2 32.9 34.2   RDW 23.0* 21.8* 22.2* 22.5*    176 221  280     INRNo lab results found in last 7 days.  Arterial Blood GasNo lab results found in last 7 days.    IMAGING  Xr Chest 2 Views  Result Date: 3/29/2020  IMPRESSION: Interval increase in subtle interstitial markings at the right lung base. Differential diagnosis includes atelectasis although an early infection cannot be excluded.    Us Abdomen Limited  Result Date: 4/6/2020  IMPRESSION: Normal right upper quadrant ultrasound status post cholecystectomy.    Xr Chest Port 1 View  Result Date: 3/28/2020  IMPRESSION: Lungs clear. Previous right upper lung infiltrate resolved. Normal heart size. Left chest wall port with catheter tip in the SVC.

## 2020-04-06 NOTE — PROGRESS NOTES
"Brief Note     Pt concerned about having exchange transfusion. She states that \"it is her body and she can say no.\" She states she is uncomfortable with the following     1) Size of the machine. Wondering what size the exchange transfusion machine is and is hoping for clarification regarding this     2)  Patient very concerned regarding use of her arms. She would like reassurance that her left arm wouldn't be out of commission.     3) She will need an internal jugular placed through Interventional radiology for this procedure for high enough flow rates. Confirmed with transfusion medicine.   If she agrees to an internal jugular placement she WILL be able to use both her arms during the procedure.   In order for exchange to occur she will need this line.   PICC is NOT acceptable.     4) I asked pt if she would be amenable to me speaking with her mother. SHe states that she would not allow me to speak with her mother and would do so on her own.     5) She is concerned regarding her capacity to get up, urinate etc. During the procedure. If internal jugular line is placed, pt will be able to get up and use commode within confines of how long the line is for exchange transfusion.     In light of above concerns, patient has not consented for the exchange transfusion. Will hold off on pRBC until she agrees to above exchange transfusion and line placement.     All above communicated to heme and transfusion med.     Will plan to readdress with patient again tomorrow.     "

## 2020-04-06 NOTE — PLAN OF CARE
AOx4. Lethargic at times; calls approprietly. Up with SBA to commode. Hypotensive but within order parameters; OVSS on ra. Cont pulse ox on. Pt reporting nasal dryness; PRN Ocean spray given. Poor appetite, ate pudding this shannan. LBM 4/3, +flatus. AUO, pt has urgency to use the bathroom and if unable to get to commode in time will wet the bed/pee on floor; pt refused brief. R arm contracted d/t previus CVA. Back pain managed with PCA Morphine and scheduled Tylenol; pt refused scheduled [place]. Port infusing with PCA Morphine and carrier fluid. Hgb 6.8 and providers aware; no interventions at this time. Cont to monitor.

## 2020-04-06 NOTE — PLAN OF CARE
"/57 (BP Location: Left arm)   Pulse 86   Temp 98.2  F (36.8  C) (Oral)   Resp 16   LMP  (LMP Unknown)   SpO2 92%   VSS, refusing continuous pulse ox , A&Ox4. Pain managed with PCA morphine and scheduled tylenol. Denies nausea. Port infusing PCA and IVMF. Passing gas, no BM this shift. Regular diet, poor po intake. Unwilling to void overnight, when asked if she could try she stated \"that's kind of a stupid question, I'll feel it when I have to go\". Reusing bladder scan. Up with SBA.  Continue POC.   "

## 2020-04-06 NOTE — PROVIDER NOTIFICATION
Notified MD at 0708  regarding lab results.     Orders were not obtained.    Comments: Critical Hemoglobin value of 6.6. Awaiting reply.

## 2020-04-06 NOTE — PROGRESS NOTES
Brief note   D/W heme   Plan to:   1) Start Deferasirox (aware pt was to bring from home, but would appreciate starting while inpatient as pt counts low and she cannot get this med from home)   2) Plan for exchange transfusion. Transfusion med consult in place and paged. Pt has PORT in place but no other central line.   3) Transfuse 1u pRBC this afternoon in prep for exchange transfusion 4/7/20; discussed with heme and transfusion med    Chapis Fernando [id number]

## 2020-04-07 ENCOUNTER — APPOINTMENT (OUTPATIENT)
Dept: INTERVENTIONAL RADIOLOGY/VASCULAR | Facility: CLINIC | Age: 20
DRG: 812 | End: 2020-04-07
Attending: NURSE PRACTITIONER
Payer: COMMERCIAL

## 2020-04-07 ENCOUNTER — APPOINTMENT (OUTPATIENT)
Dept: GENERAL RADIOLOGY | Facility: CLINIC | Age: 20
DRG: 812 | End: 2020-04-07
Attending: STUDENT IN AN ORGANIZED HEALTH CARE EDUCATION/TRAINING PROGRAM
Payer: COMMERCIAL

## 2020-04-07 LAB
ABO + RH BLD: NORMAL
ABO + RH BLD: NORMAL
ANISOCYTOSIS BLD QL SMEAR: ABNORMAL
B-HCG SERPL-ACNC: <1 IU/L (ref 0–5)
BASOPHILS # BLD AUTO: 0.2 10E9/L (ref 0–0.2)
BASOPHILS NFR BLD AUTO: 1.7 %
BLD GP AB SCN SERPL QL: NORMAL
BLD PROD TYP BPU: NORMAL
BLD UNIT ID BPU: 0
BLOOD BANK CMNT PATIENT-IMP: NORMAL
BLOOD PRODUCT CODE: NORMAL
BPU ID: NORMAL
DIFFERENTIAL METHOD BLD: ABNORMAL
EOSINOPHIL # BLD AUTO: 0.8 10E9/L (ref 0–0.7)
EOSINOPHIL NFR BLD AUTO: 6.1 %
ERYTHROCYTE [DISTWIDTH] IN BLOOD BY AUTOMATED COUNT: 24.5 % (ref 10–15)
HCT VFR BLD AUTO: 19.9 % (ref 35–47)
HCT VFR BLD AUTO: 21.5 % (ref 35–47)
HCT VFR BLD AUTO: 23.9 % (ref 35–47)
HGB BLD-MCNC: 6.6 G/DL (ref 11.7–15.7)
HGB BLD-MCNC: 7.1 G/DL (ref 11.7–15.7)
HGB BLD-MCNC: 8.1 G/DL (ref 11.7–15.7)
INR PPP: 1.28 (ref 0.86–1.14)
LACTATE BLD-SCNC: 0.8 MMOL/L (ref 0.7–2)
LYMPHOCYTES # BLD AUTO: 2.7 10E9/L (ref 0.8–5.3)
LYMPHOCYTES NFR BLD AUTO: 20 %
MACROCYTES BLD QL SMEAR: PRESENT
MCH RBC QN AUTO: 30.3 PG (ref 26.5–33)
MCHC RBC AUTO-ENTMCNC: 33.2 G/DL (ref 31.5–36.5)
MCV RBC AUTO: 91 FL (ref 78–100)
MONOCYTES # BLD AUTO: 0.6 10E9/L (ref 0–1.3)
MONOCYTES NFR BLD AUTO: 4.3 %
NEUTROPHILS # BLD AUTO: 9.2 10E9/L (ref 1.6–8.3)
NEUTROPHILS NFR BLD AUTO: 67.9 %
NRBC # BLD AUTO: 1.8 10*3/UL
NRBC BLD AUTO-RTO: 13 /100
NUM BPU REQUESTED: 7
PLATELET # BLD AUTO: 203 10E9/L (ref 150–450)
PLATELET # BLD EST: ABNORMAL 10*3/UL
POIKILOCYTOSIS BLD QL SMEAR: ABNORMAL
POLYCHROMASIA BLD QL SMEAR: ABNORMAL
RBC # BLD AUTO: 2.18 10E12/L (ref 3.8–5.2)
RETICS # AUTO: 667.1 10E9/L (ref 25–95)
RETICS/RBC NFR AUTO: 30.6 % (ref 0.5–2)
SICKLE CELLS BLD QL SMEAR: ABNORMAL
SPECIMEN EXP DATE BLD: NORMAL
TARGETS BLD QL SMEAR: SLIGHT
TRANSFUSION STATUS PATIENT QL: NORMAL
WBC # BLD AUTO: 13.6 10E9/L (ref 4–11)

## 2020-04-07 PROCEDURE — 71046 X-RAY EXAM CHEST 2 VIEWS: CPT

## 2020-04-07 PROCEDURE — 36512 APHERESIS RBC: CPT

## 2020-04-07 PROCEDURE — 25000128 H RX IP 250 OP 636: Performed by: PATHOLOGY

## 2020-04-07 PROCEDURE — 25000128 H RX IP 250 OP 636: Performed by: STUDENT IN AN ORGANIZED HEALTH CARE EDUCATION/TRAINING PROGRAM

## 2020-04-07 PROCEDURE — C1769 GUIDE WIRE: HCPCS

## 2020-04-07 PROCEDURE — 83605 ASSAY OF LACTIC ACID: CPT | Performed by: STUDENT IN AN ORGANIZED HEALTH CARE EDUCATION/TRAINING PROGRAM

## 2020-04-07 PROCEDURE — 27210732 ZZH ACCESSORY CR1

## 2020-04-07 PROCEDURE — 85025 COMPLETE CBC W/AUTO DIFF WBC: CPT | Performed by: STUDENT IN AN ORGANIZED HEALTH CARE EDUCATION/TRAINING PROGRAM

## 2020-04-07 PROCEDURE — 85014 HEMATOCRIT: CPT | Performed by: PATHOLOGY

## 2020-04-07 PROCEDURE — 85610 PROTHROMBIN TIME: CPT | Performed by: STUDENT IN AN ORGANIZED HEALTH CARE EDUCATION/TRAINING PROGRAM

## 2020-04-07 PROCEDURE — 99233 SBSQ HOSP IP/OBS HIGH 50: CPT | Mod: GC | Performed by: STUDENT IN AN ORGANIZED HEALTH CARE EDUCATION/TRAINING PROGRAM

## 2020-04-07 PROCEDURE — C1752 CATH,HEMODIALYSIS,SHORT-TERM: HCPCS

## 2020-04-07 PROCEDURE — 36592 COLLECT BLOOD FROM PICC: CPT | Performed by: STUDENT IN AN ORGANIZED HEALTH CARE EDUCATION/TRAINING PROGRAM

## 2020-04-07 PROCEDURE — 27210908 ZZH NEEDLE CR4

## 2020-04-07 PROCEDURE — 25000132 ZZH RX MED GY IP 250 OP 250 PS 637: Performed by: STUDENT IN AN ORGANIZED HEALTH CARE EDUCATION/TRAINING PROGRAM

## 2020-04-07 PROCEDURE — 25000128 H RX IP 250 OP 636: Performed by: PHYSICIAN ASSISTANT

## 2020-04-07 PROCEDURE — 85018 HEMOGLOBIN: CPT | Performed by: PATHOLOGY

## 2020-04-07 PROCEDURE — 76937 US GUIDE VASCULAR ACCESS: CPT

## 2020-04-07 PROCEDURE — 85045 AUTOMATED RETICULOCYTE COUNT: CPT | Performed by: STUDENT IN AN ORGANIZED HEALTH CARE EDUCATION/TRAINING PROGRAM

## 2020-04-07 PROCEDURE — 84702 CHORIONIC GONADOTROPIN TEST: CPT | Performed by: NURSE PRACTITIONER

## 2020-04-07 PROCEDURE — 83021 HEMOGLOBIN CHROMOTOGRAPHY: CPT | Performed by: PATHOLOGY

## 2020-04-07 PROCEDURE — 25000125 ZZHC RX 250: Performed by: PHYSICIAN ASSISTANT

## 2020-04-07 PROCEDURE — 25000125 ZZHC RX 250: Performed by: PATHOLOGY

## 2020-04-07 PROCEDURE — 99152 MOD SED SAME PHYS/QHP 5/>YRS: CPT

## 2020-04-07 PROCEDURE — 12000001 ZZH R&B MED SURG/OB UMMC

## 2020-04-07 PROCEDURE — 02H633Z INSERTION OF INFUSION DEVICE INTO RIGHT ATRIUM, PERCUTANEOUS APPROACH: ICD-10-PCS | Performed by: PHYSICIAN ASSISTANT

## 2020-04-07 PROCEDURE — 77001 FLUOROGUIDE FOR VEIN DEVICE: CPT

## 2020-04-07 PROCEDURE — P9016 RBC LEUKOCYTES REDUCED: HCPCS | Performed by: STUDENT IN AN ORGANIZED HEALTH CARE EDUCATION/TRAINING PROGRAM

## 2020-04-07 RX ORDER — HEPARIN SODIUM (PORCINE) LOCK FLUSH IV SOLN 100 UNIT/ML 100 UNIT/ML
3 SOLUTION INTRAVENOUS ONCE
Status: COMPLETED | OUTPATIENT
Start: 2020-04-07 | End: 2020-04-07

## 2020-04-07 RX ORDER — DIPHENHYDRAMINE HYDROCHLORIDE 50 MG/ML
50 INJECTION INTRAMUSCULAR; INTRAVENOUS
Status: CANCELLED | OUTPATIENT
Start: 2020-04-07

## 2020-04-07 RX ORDER — HEPARIN SODIUM (PORCINE) LOCK FLUSH IV SOLN 100 UNIT/ML 100 UNIT/ML
3 SOLUTION INTRAVENOUS
Status: DISCONTINUED | OUTPATIENT
Start: 2020-04-07 | End: 2020-04-10

## 2020-04-07 RX ORDER — HEPARIN SODIUM 5000 [USP'U]/.5ML
5000 INJECTION, SOLUTION INTRAVENOUS; SUBCUTANEOUS EVERY 12 HOURS
Status: DISCONTINUED | OUTPATIENT
Start: 2020-04-07 | End: 2020-04-08

## 2020-04-07 RX ORDER — HEPARIN SODIUM (PORCINE) LOCK FLUSH IV SOLN 100 UNIT/ML 100 UNIT/ML
3 SOLUTION INTRAVENOUS EVERY 24 HOURS
Status: DISCONTINUED | OUTPATIENT
Start: 2020-04-07 | End: 2020-04-08

## 2020-04-07 RX ADMIN — Medication: at 09:59

## 2020-04-07 RX ADMIN — SODIUM CHLORIDE, PRESERVATIVE FREE 3 ML: 5 INJECTION INTRAVENOUS at 18:31

## 2020-04-07 RX ADMIN — ASPIRIN 81 MG CHEWABLE TABLET 81 MG: 81 TABLET CHEWABLE at 10:03

## 2020-04-07 RX ADMIN — MIDAZOLAM 0.5 MG: 1 INJECTION INTRAMUSCULAR; INTRAVENOUS at 15:52

## 2020-04-07 RX ADMIN — HEPARIN SODIUM 5000 UNITS: 5000 INJECTION, SOLUTION INTRAVENOUS; SUBCUTANEOUS at 19:35

## 2020-04-07 RX ADMIN — DEXTRAN 70 AND HYPROMELLOSE 2910 1 DROP: 1; 3 SOLUTION/ DROPS OPHTHALMIC at 00:15

## 2020-04-07 RX ADMIN — ANTICOAGULANT CITRATE DEXTROSE SOLUTION FORMULA A 370 ML: 12.25; 11; 3.65 SOLUTION INTRAVENOUS at 16:30

## 2020-04-07 RX ADMIN — HEPARIN 1.4 ML: 100 SYRINGE at 15:52

## 2020-04-07 RX ADMIN — LIDOCAINE HYDROCHLORIDE 15 ML: 10 INJECTION, SOLUTION EPIDURAL; INFILTRATION; INTRACAUDAL; PERINEURAL at 15:53

## 2020-04-07 RX ADMIN — HYDROXYUREA 2000 MG: 500 CAPSULE ORAL at 10:01

## 2020-04-07 RX ADMIN — FLUTICASONE FUROATE AND VILANTEROL TRIFENATATE 1 PUFF: 100; 25 POWDER RESPIRATORY (INHALATION) at 10:04

## 2020-04-07 RX ADMIN — SERTRALINE HYDROCHLORIDE 150 MG: 100 TABLET ORAL at 10:03

## 2020-04-07 RX ADMIN — HEPARIN 1.1 ML: 100 SYRINGE at 15:51

## 2020-04-07 RX ADMIN — OMEPRAZOLE 40 MG: 20 CAPSULE, DELAYED RELEASE ORAL at 10:04

## 2020-04-07 RX ADMIN — GABAPENTIN 900 MG: 300 CAPSULE ORAL at 10:02

## 2020-04-07 ASSESSMENT — ACTIVITIES OF DAILY LIVING (ADL)
ADLS_ACUITY_SCORE: 12

## 2020-04-07 ASSESSMENT — PAIN DESCRIPTION - DESCRIPTORS
DESCRIPTORS: SHARP

## 2020-04-07 ASSESSMENT — MIFFLIN-ST. JEOR: SCORE: 1455.25

## 2020-04-07 NOTE — PROGRESS NOTES
"Patient Name: Jennifer Cervantes  Medical Record Number: [id number]  Today's Date: 4/7/2020    Procedure: Place non-tunneled apheresis line   Proceduralist: Shaw    Procedure Start: 1410  Procedure end: 1520  Sedation medications administered: Versed 0.5mg    Report given to: David VIVEROS RN    D: Pt arrived via cart, and very agreeable to the procedure when consent reviewed with her and she signed the consent. I witnessed the PA discussing what to expect as far as pain.  As we got the patient positioned on the table and started to prep she again asked if it was going to hurt.  I told her, \"no, it doesn't hurt much, it will just be a sting and a burn when they give you the numbing medication, like at the dentist\".  After this she started crying and hyperventilating and when I tried to talk to her calmly and ask what was going on she turned away from me and ignored me and cried.  I tried again to console her and then I went to get the provider who came in and also tried to reassure her.  She finally calmed down and said she was okay going on with the procedure.  VSS upon arrival.  Pt was positioned supine for the procedure.  I: Monitored pt during the procedure.  A: She remained mostly calm throughout the procedure.  We attempted the right side first without success and moved to the left.  Both times we gave lidocaine she started wailing and screaming but calmed down in between and said she wanted to continue.  She took morphine PCA bumps throughout the procedure. At the end of the procedure her pain became intolerable and her heart rate went up to 150.  The provider asked that I give her 0.5mg of Versed so he could secure the catheter.  I did and we were able to successfully complete the procedure.  Left Insertion site is clean, dry, and covered.  Line has been verified with flouro and is ready for use.  P: Pt care to continue on 7C and pt to get apheresis this evening.  "

## 2020-04-07 NOTE — PLAN OF CARE
/50 (BP Location: Left arm)   Pulse 89   Temp 98  F (36.7  C) (Oral)   Resp 16   LMP  (LMP Unknown)   SpO2 (!) 89%     VSS on RA. Hgb 6.6, MD recommending internal jugular placement and blood exchange tomorrow, but pt refusing as of now stating she needs to speak with her mother about it. Pt refused full assessment. Pain controlled with PCA morphine continuous dose increased to 1.2mg and bump dose lowered to 0.5Q10. UAL in room. Tolerating diet with poor appetite. No N/V. Passing stool and flatus. Voids spont. PIV infusing TKO + morphine. Pt resting comfortably between cares. Continue POC.

## 2020-04-07 NOTE — CONSULTS
Patient is on IR schedule 4/7 for an image guided placement of a large bore, double lumen, non-tunneled CVC for PLEX.   Labs WNL for procedure. HCG/INR pending.    No NPO required.  Consent will be done prior to procedure. Pt is consentable per primary team.    Please contact the IR charge RN at [phone] for estimated time of procedure.     Case discussed with Dr. More from IR and Dr. Horta. This is a 21 yo female with sickle cell disease (HbSS) complicated by frequent pain crises, history of stroke leading to significant cognitive delays and RUE hemiparesis, iron overload due to chronic transfusions, anxiety, depression, poor social support and structure at home who was admitted on 4/3 from clinic with ongoing sickle pain. Team planning for PLEX and non-tunneled CVC has been requested.    Malgorzata Robles DNP, APRN  Interventional Radiology  Pager: [phone]

## 2020-04-07 NOTE — PLAN OF CARE
A&OX4. VSS on room air. Refusing continuous pulse ox. Pain control with PCA morphine. Up ad janett in room. Passing gas, no BM this shift. On reg diet, denies N/V. Voiding spontaneously but refuses to get up and use the bathroom sometimes. Port infusing TKO with PCA morphine. Hemoglobin 6.6, hematology is consulting and recommending internal jugular placement and blood exchange today. Calls appropriately. Continue with plan of care

## 2020-04-07 NOTE — PROCEDURES
[facility], [place]    Procedure: IR Procedure Note    Date/Time: 4/7/2020 3:23 PM  Performed by: Pedro Ramsey PA-C  Authorized by: Pedro Ramsey PA-C     UNIVERSAL PROTOCOL   Site Marked: NA  Prior Images Obtained and Reviewed:  Yes  Required items: Required blood products, implants, devices and special equipment available    Patient identity confirmed:  Verbally with patient, arm band, provided demographic data and hospital-assigned identification number  Patient was reevaluated immediately before administering moderate or deep sedation or anesthesia  Confirmation Checklist:  Patient's identity using two indicators, relevant allergies, procedure was appropriate and matched the consent or emergent situation and correct equipment/implants were available  Time out: Immediately prior to the procedure a time out was called    Universal Protocol: the Joint Commission Universal Protocol was followed    Preparation: Patient was prepped and draped in usual sterile fashion           ANESTHESIA    Anesthesia: Local infiltration  Local Anesthetic:  Lidocaine 1% without epinephrine      SEDATION    Patient Sedated: Yes    Sedation Type:  Anxiolysis  Sedation:  Morphine and midazolam  Vital signs: Vital signs monitored during sedation    See dictated procedure note for full details.  Findings: Morphine PCA from unit prn patient directed allowed dosing  Midazolam 0.5 mg  Sedation time: 10 minutes    Specimens: none    Complications: None    Condition: Stable    PROCEDURE Describe Procedure: Jennifer Cervantes   MRN: [id number]    Completed placement of 16 Hebrew 20 cm dual lumen, non-tunneled central venous access catheter via LIJV. Tip lying in the right atrium. Catheter okay to use immediately. RIJV placement attempted but unsuccessful due to inability to navigate wire centrally, likely innominate vein narrowing/occlusion. Patient allergy to contrast dyes, has LEFT chest  port.  Dx: Sickle cell disease. Braulio.    Morphine PCA from unit prn patient directed allowed dosing  Midazolam 0.5 mg  Sedation time: 10 minutes  Length of time physician/provider present for 1:1 monitoring during sedation: 10

## 2020-04-07 NOTE — PROCEDURES
Transfusion Medicine  Apheresis Procedure Note    Jennifer Cervantes [id number]   YOB: 2000 Age: 20 year old        Procedure: Therapeutic red blood cell exchange           Assessment and Plan:   20 year old female undergoes red blood cell exchange for sickle cell crisis.   The patient does not have adequate veins a central line was placed and used for access for the procedure.      She tolerated the early part of the procedure well without complication.  However, near the end she started feeling chilled, her temperature was up to 99.1, recheck was 96.9 F.  There were also some flow alarms with the line and the device.  The patient reportedly was quite distressed, initially shouting and not participating the evaluation.  The decision was made to stop the procedure at this point, the patient also preferred to stop the procedure.  The procedure was very near completion, 1628 mls had been exchanged of the originally planned 1800 mls.   The post procedure hematocrit was improved as expected with the procedure, the pre and post hemoglobin S results are pending.           History of Present Illness:   20 year old female is seen for consultation for therapeutic red blood cell exchange .  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  Current pain complaints are located primarily in her back.                   Past Medical History:     Past Medical History:   Diagnosis Date     Anemia      Anxiety      Bleeding disorder (H)      Blood clotting disorder (H)      Cerebral infarction (H) 2015     Cognitive developmental delay     low IQ. 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 (H)     right hand contractures      Iron overload due to repeated red blood cell transfusions      Migraines      Oppositional defiant behavior      Uncomplicated asthma              Past Surgical History:     Past Surgical History:   Procedure Laterality Date     AS INSERT TUNNELED CV 2 CATH W/O PORT/PUMP       C BREAST REDUCTION (INCLUDES LIPO) TIER 3 Bilateral 04/23/2019     REPAIR TENDON ELBOW Right 10/2/2019    Procedure: Right Elbow Flexor Lengthening, Flexor Pronator Slide Of Wrist and Finger, Thumb Adductor Lengthening;  Surgeon: Anai Franco MD;  Location: [facility]     TONSILLECTOMY Bilateral 10/2/2019    Procedure: Bilateral Tonsillectomy;  Surgeon: Farhana Guy MD;  Location: [facility]              Allergies:     Allergies   Allergen Reactions     Fish-Derived Products Hives     Seafood Hives     Contrast Dye      Diagnostic X-Ray Materials      Gadolinium              Medications:     Current Facility-Administered Medications   Medication     acetaminophen (TYLENOL) tablet 975 mg     albuterol (PROVENTIL) neb solution 2.5 mg     Anticoagulant Citrate Dextrose Formula A   ([facility])     aspirin (ASA) chewable tablet 81 mg     deferasirox (JADENU) tablet 900 mg     diphenhydrAMINE (BENADRYL) capsule 25-50 mg     fluticasone-vilanterol (BREO ELLIPTA) 100-25 MCG/INH inhaler 1 puff     gabapentin (NEURONTIN) capsule 900 mg     heparin 100 UNIT/ML injection 3 mL     heparin 100 UNIT/ML injection 3 mL     hydroxyurea (HYDREA) capsule 2,000 mg     hypromellose-dextran (ARTIFICAL TEARS) 0.1-0.3 % ophthalmic solution 1 drop     lidocaine (LMX4) cream     lidocaine 1 % 0.1-1 mL     melatonin tablet 3 mg     morphine  PCA 1 mg/mL OPIOID TOLERANT     naloxone (NARCAN) injection 0.1-0.4 mg     omeprazole (priLOSEC) CR capsule 40 mg     ondansetron (ZOFRAN-ODT) ODT tab 4 mg    Or     ondansetron (ZOFRAN) injection 4 mg     prochlorperazine (COMPAZINE) tablet 10 mg    Or     prochlorperazine (COMPAZINE) Suppository 25 mg      "sennosides (SENOKOT) tablet 1 tablet     sertraline (ZOLOFT) tablet 150 mg     sodium chloride (OCEAN) 0.65 % nasal spray 1 spray     sodium chloride (PF) 0.9% PF flush 10 mL     sodium chloride (PF) 0.9% PF flush 10 mL     sodium chloride (PF) 0.9% PF flush 3 mL     sodium chloride (PF) 0.9% PF flush 3 mL             Review of Systems:     See above           Vital Signs:   /53 (BP Location: Left arm)   Pulse 89   Temp 99.7  F (37.6  C) (Oral)   Resp 16   Ht 1.626 m (5' 4.02\")   Wt 70.5 kg (155 lb 6.8 oz)   LMP  (LMP Unknown)   SpO2 90%   BMI 26.67 kg/m     No acute distress.  In bed, with central line accessed for the procedure.            Data:       Results for orders placed or performed during the hospital encounter of 04/03/20 (from the past 24 hour(s))   CBC with platelets differential   Result Value Ref Range    WBC 13.6 (H) 4.0 - 11.0 10e9/L    RBC Count 2.18 (L) 3.8 - 5.2 10e12/L    Hemoglobin 6.6 (LL) 11.7 - 15.7 g/dL    Hematocrit 19.9 (L) 35.0 - 47.0 %    MCV 91 78 - 100 fl    MCH 30.3 26.5 - 33.0 pg    MCHC 33.2 31.5 - 36.5 g/dL    RDW 24.5 (H) 10.0 - 15.0 %    Platelet Count 203 150 - 450 10e9/L    Diff Method Manual Differential     % Neutrophils 67.9 %    % Lymphocytes 20.0 %    % Monocytes 4.3 %    % Eosinophils 6.1 %    % Basophils 1.7 %    Nucleated RBCs 13 (H) 0 /100    Absolute Neutrophil 9.2 (H) 1.6 - 8.3 10e9/L    Absolute Lymphocytes 2.7 0.8 - 5.3 10e9/L    Absolute Monocytes 0.6 0.0 - 1.3 10e9/L    Absolute Eosinophils 0.8 (H) 0.0 - 0.7 10e9/L    Absolute Basophils 0.2 0.0 - 0.2 10e9/L    Absolute Nucleated RBC 1.8     Anisocytosis Moderate     Poikilocytosis Moderate     Polychromasia Moderate     Sickle Cells Moderate     Target Cells Slight     Macrocytes Present     Platelet Estimate Confirming automated cell count    Reticulocyte count   Result Value Ref Range    % Retic 30.6 (H) 0.5 - 2.0 %    Absolute Retic 667.1 (H) 25 - 95 10e9/L   Interventional Radiology Adult/Peds " IP Consult: Patient to be seen: Routine within 24 hours; Call back #: [phone] - pager or [phone] - phone or [phone] - cell; Pt requiring IJ for exchange transufsion - no other options at this clemente for li...    Malgorzata Silva APRN CNP     4/7/2020  9:19 AM  Patient is on IR schedule 4/7 for an image guided placement of a   large bore, double lumen, non-tunneled CVC for PLEX.   Labs WNL for procedure. HCG/INR pending.    No NPO required.  Consent will be done prior to procedure. Pt is consentable per   primary team.    Please contact the [place] RN [place] [phone] for estimated time of   procedure.     Case discussed with Dr. More from IR and Dr. Horta.   This is a 21 yo female with sickle cell disease (HbSS)   complicated by frequent pain crises, history of stroke leading to   significant cognitive delays and RUE hemiparesis, iron overload   due to chronic transfusions, anxiety, depression, poor social   support and structure at home who was admitted on 4/3 from clinic   with ongoing sickle pain. Team planning for PLEX and non-tunneled   CVC has been requested.    Malgorzata Robles DNP, APRN  Interventional Radiology  Pager: [phone]     XR Chest 2 Views    Narrative    XR CHEST 2 VW  4/7/2020 8:27 AM      HISTORY: Assess for infiltrate    COMPARISON: Chest radiograph from 3/29/2020, 10/10/2019, 10/7/2019    FINDINGS: PA and lateral views of the chest. Left-sided transvenous  approach Port-A-Cath tip is in the low SVC.    Cardiac silhouette is within normal limits. No acute pulmonary  opacities. No pleural effusion or pneumothorax. Cholecystectomy clip  is located in the right upper abdomen. Visualized upper abdomen is  otherwise unremarkable. Irregular contour of the left mid clavicle,  which is similar in appearance dating back to 10/7/2019, this may  represent old trauma.      Impression    IMPRESSION: No acute radiographic cardiopulmonary abnormality.    I have personally  reviewed the examination and initial interpretation  and I agree with the findings.    MARY JANE JUÁREZ MD   INR   Result Value Ref Range    INR 1.28 (H) 0.86 - 1.14   IR CVC Non Tunnel Placement    Narrative    Jennifer Cervantes   MRN: [id number]    Completed placement of 16 [place] 20 cm dual lumen, non-tunneled  central venous access catheter via LIJV. Tip lying in the right  atrium. Catheter okay to use immediately. RIJV placement attempted but  unsuccessful due to inability to navigate wire centrally, likely  innominate vein narrowing/occlusion. Patient allergy to contrast dyes,  has LEFT chest port.  Dx: Sickle cell disease. Patient did not  emotionally tolerate the procedure well and procedural sedation should  be considered in the future. Braulio. 7.6 min    Morphine PCA from unit prn patient directed allowed dosing  Midazolam 0.5 mg  Sedation time: 10 minutes   IR Procedure Note    Narrative    Pedro Ramsey PA-C     4/7/2020  3:28 PM  [facility] [place]    Procedure: IR Procedure Note    Date/Time: 4/7/2020 3:23 PM  Performed by: Pedro Ramsey PA-C  Authorized by: Pedro Ramsey PA-C     UNIVERSAL PROTOCOL   Site Marked: NA  Prior Images Obtained and Reviewed:  Yes  Required items: Required blood products, implants, devices and special   equipment available    Patient identity confirmed:  Verbally with patient, arm band, provided   demographic data and hospital-assigned identification number  Patient was reevaluated immediately before administering moderate or deep   sedation or anesthesia  Confirmation Checklist:  Patient's identity using two indicators, relevant   allergies, procedure was appropriate and matched the consent or emergent   situation and correct equipment/implants were available  Time out: Immediately prior to the procedure a time out was called    Universal Protocol: the Joint Commission Universal Protocol was followed     Preparation: Patient was prepped and draped in usual sterile fashion           ANESTHESIA    Anesthesia: Local infiltration  Local Anesthetic:  Lidocaine 1% without epinephrine      SEDATION    Patient Sedated: Yes    Sedation Type:  Anxiolysis  Sedation:  Morphine and midazolam  Vital signs: Vital signs monitored during sedation    See dictated procedure note for full details.  Findings: Morphine PCA from unit prn patient directed allowed dosing  Midazolam 0.5 mg  Sedation time: 10 minutes    Specimens: none    Complications: None    Condition: Stable    PROCEDURE Describe Procedure: Jennifer Cervantes   MRN: [id number]    Completed placement of 16 [place] 20 cm dual lumen, non-tunneled central   venous access catheter via LIJV. Tip lying in the right atrium. Catheter   okay to use immediately. RIJV placement attempted but unsuccessful due to   inability to navigate wire centrally, likely innominate vein   narrowing/occlusion. Patient allergy to contrast dyes, has LEFT chest   port.  Dx: Sickle cell disease. Braulio.    Morphine PCA from unit prn patient directed allowed dosing  Midazolam 0.5 mg  Sedation time: 10 minutes  Length of time physician/provider present for 1:1 monitoring during   sedation: 10                   Procedure Summary:   A single volume red blood cell exchange was performed and donor red blood cell units were used as the replacement fluid.  A dual lumen central line was used for access and allowed for appropriate flow during the procedure.  ACD-A was used for anticoagulation. To offset the effects of the citrate, calcium gluconate was given in the return line.  The patient's vital signs were stable throughout.  Due to patient anxiety and concerns the procedure was halted after 1628 mls of the 1800 ml scheduled exchange (over 90%).    See apheresis flowsheet for additional details.      Attestation: During the procedure the patient was directly seen and evaluated by me, Jitendra Shaffer MD.   When seeing apheresis patients, I have been wearing masks and maximizing the distance between the patient and myself while in the room.    Jitendra Shaffer MD  Transfusion Medicine Attending  Laboratory Medicine & Pathology  Pager: [phone]

## 2020-04-07 NOTE — PLAN OF CARE
Alert & Orientated.  On regular diet. Vitals stable. Sometimes chooses not to wear oxygen. Pain managed with morphine PCA pump.  Plan is for non-tunneled CVC to be placed for plasma exchange to take place. Pt has RUE hemiparesis.  Up ad janett.  Chest port infusing MIVF with morphine pain pump. Had one bm as per pt. Voiding but not saving.

## 2020-04-07 NOTE — PROGRESS NOTES
HEMATOLOGY BRIEF PROGRESS NOTE     Patient was not seen today as she was at IR procedure for left internal jugular line placement. Agree with exchange transfusion. Appreciate primary team, IR and transfusion medicine team's assistance.    Taylor Mei  Hematology, Oncology & Transplantation fellow  Pager: [phone]  4/7/2020

## 2020-04-07 NOTE — PROVIDER NOTIFICATION
Notified Resident at 0658 AM regarding critical results read back.  Pt Hgb 6.6.    Edin Wharton- spoke with provider with regards to the lab value.    Orders were not obtained.    Comments: Will continue to monitor.

## 2020-04-07 NOTE — CONSULTS
Transfusion Medicine Consultation    Jennifer Cervantes [id number]   YOB: 2000 Age: 20 year old   Date of Consult: 4/6/2020     Reason for consult: Therapeutic red blood cell exchange           Assessment and Plan:   20 year old female is seen for consultation for red blood cell exchange for sickle cell crisis.  The plan is to complete an exchange on the afternoon of 4/7.  The patient does not have adequate veins and will require line placement. Plans to obtain one are underway.      She had some initial questions and concerns about the procedure, I met with her the evening of 4/6.  She wanted to think it over and then signed the consent for the next day.  I had multiple discussions with the clinical team, please see the brief note from Chapis Horta at 3:33 PM on 4/6/2020 for additional details.  The procedure, risks/benefits were discussed with the patient and all of her questions have been addressed.  On 4/7/2020, the patient agreed and signed the consent forms for the red blood cell exchange.         Chief Complaint:   Transfusion medicine consultation.         History of Present Illness:   20 year old female is seen for consultation for therapeutic red blood cell exchange .  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  Current pain complaints are located primarily in her back.                   Past Medical History:     Past Medical History:   Diagnosis Date     Anemia      Anxiety      Bleeding disorder (H)      Blood clotting disorder (H)      Cerebral infarction (H) 2015     Cognitive developmental delay     low IQ. 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 (H)     right hand  contractures     Iron overload due to repeated red blood cell transfusions      Migraines      Oppositional defiant behavior      Uncomplicated asthma              Past Surgical History:     Past Surgical History:   Procedure Laterality Date     AS INSERT TUNNELED CV 2 CATH W/O PORT/PUMP       C BREAST REDUCTION (INCLUDES LIPO) TIER 3 Bilateral 04/23/2019     REPAIR TENDON ELBOW Right 10/2/2019    Procedure: Right Elbow Flexor Lengthening, Flexor Pronator Slide Of Wrist and Finger, Thumb Adductor Lengthening;  Surgeon: Anai Franco MD;  Location[facility]     TONSILLECTOMY Bilateral 10/2/2019    Procedure: Bilateral Tonsillectomy;  Surgeon: Farhana Guy MD;  Location: [facility]              Allergies:     Allergies   Allergen Reactions     Fish-Derived Products Hives     Seafood Hives     Contrast Dye      Diagnostic X-Ray Materials      Gadolinium              Medications:     Current Facility-Administered Medications   Medication     acetaminophen (TYLENOL) tablet 975 mg     albuterol (PROVENTIL) neb solution 2.5 mg     aspirin (ASA) chewable tablet 81 mg     deferasirox (JADENU) tablet 900 mg     diphenhydrAMINE (BENADRYL) capsule 25-50 mg     fluticasone-vilanterol (BREO ELLIPTA) 100-25 MCG/INH inhaler 1 puff     gabapentin (NEURONTIN) capsule 900 mg     hydroxyurea (HYDREA) capsule 2,000 mg     hypromellose-dextran (ARTIFICAL TEARS) 0.1-0.3 % ophthalmic solution 1 drop     lidocaine (LMX4) cream     lidocaine 1 % 0.1-1 mL     melatonin tablet 3 mg     morphine  PCA 1 mg/mL OPIOID TOLERANT     naloxone (NARCAN) injection 0.1-0.4 mg     omeprazole (priLOSEC) CR capsule 40 mg     ondansetron (ZOFRAN-ODT) ODT tab 4 mg    Or     ondansetron (ZOFRAN) injection 4 mg     prochlorperazine (COMPAZINE) tablet 10 mg    Or     prochlorperazine (COMPAZINE) Suppository 25 mg     sennosides (SENOKOT) tablet 1 tablet     sertraline (ZOLOFT) tablet 150 mg     sodium chloride (OCEAN) 0.65 % nasal spray 1 spray      sodium chloride (PF) 0.9% PF flush 3 mL     sodium chloride (PF) 0.9% PF flush 3 mL             Review of Systems:     CONSTITUTIONAL:  negative for  fevers and chills  HEENT:  negative for  nasal congestion and sore throat  RESPIRATORY:  negative for  dry cough and dyspnea  GASTROINTESTINAL:  negative for nausea, vomiting, change in bowel habits and diarrhea  NEUROLOGICAL:  negative for seizures, numbness and tingling           Vital Signs:   /46 (BP Location: Left arm)   Pulse 84   Temp 99.7  F (37.6  C) (Oral)   Resp 16   LMP  (LMP Unknown)   SpO2 94%             Data:       Results for orders placed or performed during the hospital encounter of 04/03/20 (from the past 24 hour(s))   ABO/Rh type and screen   Result Value Ref Range    Units Ordered 1     ABO O     RH(D) Pos     Antibody Screen Neg     Test Valid Only At          [facility]    Specimen Expires 04/09/2020     Crossmatch Red Blood Cells    Blood component   Result Value Ref Range    Unit Number [id number]     Blood Component Type Red Blood Cells Leukocyte Reduced     Division Number 00     Status of Unit Ready for patient 04/06/2020 2101     Blood Product Code F3232D04     Unit Status EVANGELINA    CBC with platelets differential   Result Value Ref Range    WBC 13.6 (H) 4.0 - 11.0 10e9/L    RBC Count 2.18 (L) 3.8 - 5.2 10e12/L    Hemoglobin 6.6 (LL) 11.7 - 15.7 g/dL    Hematocrit 19.9 (L) 35.0 - 47.0 %    MCV 91 78 - 100 fl    MCH 30.3 26.5 - 33.0 pg    MCHC 33.2 31.5 - 36.5 g/dL    RDW 24.5 (H) 10.0 - 15.0 %    Platelet Count 203 150 - 450 10e9/L    Diff Method Manual Differential     % Neutrophils 67.9 %    % Lymphocytes 20.0 %    % Monocytes 4.3 %    % Eosinophils 6.1 %    % Basophils 1.7 %    Nucleated RBCs 13 (H) 0 /100    Absolute Neutrophil 9.2 (H) 1.6 - 8.3 10e9/L    Absolute Lymphocytes 2.7 0.8 - 5.3 10e9/L    Absolute Monocytes 0.6 0.0 - 1.3 10e9/L    Absolute Eosinophils 0.8 (H) 0.0 -  0.7 10e9/L    Absolute Basophils 0.2 0.0 - 0.2 10e9/L    Absolute Nucleated RBC 1.8     Anisocytosis Moderate     Poikilocytosis Moderate     Polychromasia Moderate     Sickle Cells Moderate     Target Cells Slight     Macrocytes Present     Platelet Estimate Confirming automated cell count    Reticulocyte count   Result Value Ref Range    % Retic 30.6 (H) 0.5 - 2.0 %    Absolute Retic 667.1 (H) 25 - 95 10e9/L   Interventional Radiology Adult/Peds IP Consult: Patient to be seen: Routine within 24 hours; Call back #: [phone] - pager or [phone] - phone or [phone] - cell; Pt requiring IJ for exchange transufsion - no other options at this clemente for li...    Malgorzata Silva APRN CNP     4/7/2020  9:19 AM  Patient is on IR schedule 4/7 for an image guided placement of a   large bore, double lumen, non-tunneled CVC for PLEX.   Labs WNL for procedure. HCG/INR pending.    No NPO required.  Consent will be done prior to procedure. Pt is consentable per   primary team.    Please contact the [place] RN at [phone] for estimated time of   procedure.     Case discussed with Dr. More from IR and Dr. Horta.   This is a 21 yo female with sickle cell disease (HbSS)   complicated by frequent pain crises, history of stroke leading to   significant cognitive delays and RUE hemiparesis, iron overload   due to chronic transfusions, anxiety, depression, poor social   support and structure at home who was admitted on 4/3 from clinic   with ongoing sickle pain. Team planning for PLEX and non-tunneled   CVC has been requested.    Malgorzata Robles DNP, APRN  Interventional Radiology  Pager: [phone]     XR Chest 2 Views    Narrative    XR CHEST 2 VW  4/7/2020 8:27 AM      HISTORY: Assess for infiltrate    COMPARISON: Chest radiograph from 3/29/2020, 10/10/2019, 10/7/2019    FINDINGS: PA and lateral views of the chest. Left-sided transvenous  approach Port-A-Cath tip is in the low SVC.    Cardiac  silhouette is within normal limits. No acute pulmonary  opacities. No pleural effusion or pneumothorax. Cholecystectomy clip  is located in the right upper abdomen. Visualized upper abdomen is  otherwise unremarkable. Irregular contour of the left mid clavicle,  which is similar in appearance dating back to 10/7/2019, this may  represent old trauma.      Impression    IMPRESSION: No acute radiographic cardiopulmonary abnormality.    I have personally reviewed the examination and initial interpretation  and I agree with the findings.    MD Jitendra STEPHENSON MD  Transfusion Medicine Attending  Laboratory Medicine and Pathology  Pager [phone]

## 2020-04-08 LAB
ALBUMIN SERPL-MCNC: 3.1 G/DL (ref 3.4–5)
ALP SERPL-CCNC: 67 U/L (ref 40–150)
ALT SERPL W P-5'-P-CCNC: 107 U/L (ref 0–50)
ANION GAP SERPL CALCULATED.3IONS-SCNC: 5 MMOL/L (ref 3–14)
ANISOCYTOSIS BLD QL SMEAR: SLIGHT
AST SERPL W P-5'-P-CCNC: 99 U/L (ref 0–45)
BASOPHILS # BLD AUTO: 0 10E9/L (ref 0–0.2)
BASOPHILS NFR BLD AUTO: 0 %
BILIRUB SERPL-MCNC: 2.9 MG/DL (ref 0.2–1.3)
BUN SERPL-MCNC: 2 MG/DL (ref 7–30)
CALCIUM SERPL-MCNC: 8.3 MG/DL (ref 8.5–10.1)
CHLORIDE SERPL-SCNC: 106 MMOL/L (ref 94–109)
CO2 SERPL-SCNC: 26 MMOL/L (ref 20–32)
CREAT SERPL-MCNC: 0.61 MG/DL (ref 0.52–1.04)
DIFFERENTIAL METHOD BLD: ABNORMAL
EOSINOPHIL # BLD AUTO: 0.1 10E9/L (ref 0–0.7)
EOSINOPHIL NFR BLD AUTO: 0.9 %
ERYTHROCYTE [DISTWIDTH] IN BLOOD BY AUTOMATED COUNT: 18.4 % (ref 10–15)
GFR SERPL CREATININE-BSD FRML MDRD: >90 ML/MIN/{1.73_M2}
GLUCOSE SERPL-MCNC: 92 MG/DL (ref 70–99)
HCT VFR BLD AUTO: 23.8 % (ref 35–47)
HGB BLD-MCNC: 7.9 G/DL (ref 11.7–15.7)
HOWELL-JOLLY BOD BLD QL SMEAR: PRESENT
INR PPP: 1.31 (ref 0.86–1.14)
LYMPHOCYTES # BLD AUTO: 4.4 10E9/L (ref 0.8–5.3)
LYMPHOCYTES NFR BLD AUTO: 28.3 %
MCH RBC QN AUTO: 29.6 PG (ref 26.5–33)
MCHC RBC AUTO-ENTMCNC: 33.2 G/DL (ref 31.5–36.5)
MCV RBC AUTO: 89 FL (ref 78–100)
MONOCYTES # BLD AUTO: 0.7 10E9/L (ref 0–1.3)
MONOCYTES NFR BLD AUTO: 4.4 %
NEUTROPHILS # BLD AUTO: 10.4 10E9/L (ref 1.6–8.3)
NEUTROPHILS NFR BLD AUTO: 66.4 %
NRBC # BLD AUTO: 2.6 10*3/UL
NRBC BLD AUTO-RTO: 17 /100
PLATELET # BLD AUTO: 146 10E9/L (ref 150–450)
PLATELET # BLD EST: ABNORMAL 10*3/UL
POIKILOCYTOSIS BLD QL SMEAR: ABNORMAL
POLYCHROMASIA BLD QL SMEAR: SLIGHT
POTASSIUM SERPL-SCNC: 3.7 MMOL/L (ref 3.4–5.3)
PROT SERPL-MCNC: 6.3 G/DL (ref 6.8–8.8)
RBC # BLD AUTO: 2.67 10E12/L (ref 3.8–5.2)
RETICS # AUTO: 386.6 10E9/L (ref 25–95)
RETICS/RBC NFR AUTO: 14.5 % (ref 0.5–2)
SICKLE CELLS BLD QL SMEAR: SLIGHT
SODIUM SERPL-SCNC: 138 MMOL/L (ref 133–144)
TARGETS BLD QL SMEAR: SLIGHT
WBC # BLD AUTO: 15.7 10E9/L (ref 4–11)

## 2020-04-08 PROCEDURE — 25000132 ZZH RX MED GY IP 250 OP 250 PS 637: Performed by: STUDENT IN AN ORGANIZED HEALTH CARE EDUCATION/TRAINING PROGRAM

## 2020-04-08 PROCEDURE — 12000001 ZZH R&B MED SURG/OB UMMC

## 2020-04-08 PROCEDURE — 25000128 H RX IP 250 OP 636: Performed by: STUDENT IN AN ORGANIZED HEALTH CARE EDUCATION/TRAINING PROGRAM

## 2020-04-08 PROCEDURE — 85025 COMPLETE CBC W/AUTO DIFF WBC: CPT | Performed by: STUDENT IN AN ORGANIZED HEALTH CARE EDUCATION/TRAINING PROGRAM

## 2020-04-08 PROCEDURE — 85045 AUTOMATED RETICULOCYTE COUNT: CPT | Performed by: STUDENT IN AN ORGANIZED HEALTH CARE EDUCATION/TRAINING PROGRAM

## 2020-04-08 PROCEDURE — 99232 SBSQ HOSP IP/OBS MODERATE 35: CPT | Mod: GC | Performed by: STUDENT IN AN ORGANIZED HEALTH CARE EDUCATION/TRAINING PROGRAM

## 2020-04-08 PROCEDURE — 80053 COMPREHEN METABOLIC PANEL: CPT | Performed by: STUDENT IN AN ORGANIZED HEALTH CARE EDUCATION/TRAINING PROGRAM

## 2020-04-08 PROCEDURE — 85610 PROTHROMBIN TIME: CPT | Performed by: STUDENT IN AN ORGANIZED HEALTH CARE EDUCATION/TRAINING PROGRAM

## 2020-04-08 PROCEDURE — 36592 COLLECT BLOOD FROM PICC: CPT | Performed by: STUDENT IN AN ORGANIZED HEALTH CARE EDUCATION/TRAINING PROGRAM

## 2020-04-08 RX ORDER — DEXTROSE MONOHYDRATE 25 G/50ML
25-50 INJECTION, SOLUTION INTRAVENOUS
Status: DISCONTINUED | OUTPATIENT
Start: 2020-04-08 | End: 2020-04-12 | Stop reason: HOSPADM

## 2020-04-08 RX ORDER — HEPARIN SODIUM (PORCINE) LOCK FLUSH IV SOLN 100 UNIT/ML 100 UNIT/ML
3 SOLUTION INTRAVENOUS
Status: DISCONTINUED | OUTPATIENT
Start: 2020-04-08 | End: 2020-04-10

## 2020-04-08 RX ORDER — HEPARIN SODIUM (PORCINE) LOCK FLUSH IV SOLN 100 UNIT/ML 100 UNIT/ML
3 SOLUTION INTRAVENOUS EVERY 24 HOURS
Status: DISCONTINUED | OUTPATIENT
Start: 2020-04-08 | End: 2020-04-08

## 2020-04-08 RX ORDER — NICOTINE POLACRILEX 4 MG
15-30 LOZENGE BUCCAL
Status: DISCONTINUED | OUTPATIENT
Start: 2020-04-08 | End: 2020-04-12 | Stop reason: HOSPADM

## 2020-04-08 RX ADMIN — Medication: at 02:51

## 2020-04-08 RX ADMIN — SERTRALINE HYDROCHLORIDE 150 MG: 100 TABLET ORAL at 09:09

## 2020-04-08 RX ADMIN — ASPIRIN 81 MG CHEWABLE TABLET 81 MG: 81 TABLET CHEWABLE at 09:08

## 2020-04-08 RX ADMIN — HYDROXYUREA 2000 MG: 500 CAPSULE ORAL at 09:09

## 2020-04-08 RX ADMIN — FLUTICASONE FUROATE AND VILANTEROL TRIFENATATE 1 PUFF: 100; 25 POWDER RESPIRATORY (INHALATION) at 09:12

## 2020-04-08 RX ADMIN — GABAPENTIN 900 MG: 300 CAPSULE ORAL at 09:09

## 2020-04-08 RX ADMIN — DEFERASIROX 900 MG: 360 TABLET, FILM COATED ORAL at 09:09

## 2020-04-08 RX ADMIN — OMEPRAZOLE 40 MG: 20 CAPSULE, DELAYED RELEASE ORAL at 09:09

## 2020-04-08 ASSESSMENT — ACTIVITIES OF DAILY LIVING (ADL)
ADLS_ACUITY_SCORE: 12

## 2020-04-08 ASSESSMENT — PAIN DESCRIPTION - DESCRIPTORS
DESCRIPTORS: SHARP

## 2020-04-08 NOTE — PROVIDER NOTIFICATION
Notified provider regarding pt refusing Lovenox; educated pt on importance of receiving it. Awaiting response.

## 2020-04-08 NOTE — PLAN OF CARE
A&OX4. VSS on room air. On continuous pulse ox. Pain control with PCA morphine. UAL. Passing gas, no BM this shift. On reg diet, denies N/V. Voiding spontaneously. Port infusing TKO with PCA morphine. Left internal jugular in place, clean dry and intact. Calls appropriately. Comfortably resting in between care. Continue with plan of care.     Addendum: patient refused 0600' o'clock heparin injection, she stated ''she will try it later'' . MD notified

## 2020-04-08 NOTE — PROGRESS NOTES
[facility], [place]    Progress Note - Tika Licona Service        Date of Admission:  4/3/2020    Assessment & Plan   Jennifer Cervantes is a 20 year old female with history of chronic pain syndrome, sickle cell disease c/b CVA with residual R sided hemiparesis and cognitive delay with recent hospitalization 3/27-3/29 admitted for persistent sickle cell crisis    Changes:  - Improved labs post exchange transfusion, Hgb S pending   - No changes to pain mgmt   - Encourage PO intake, ensure/boost per pt wishes.  Dispo: Pending improvement in pain. PO meds. 4-5d     Acute on chronic Anemia   Sickle cell vasoocclusive crisis   HbSS  Recent hospitalization 3/27 - 3/29 for same but left prematurely. On admit retic elevated to 625, hgb and tbili stable. No sign of acute checst of infectious although does have persistent mild leukocytosis  Care plan:   Morphine PCA setup as follow:  Continuous: 1 mg/hr PCA: 0.7 mg Interval timing: 10 minutes Max: 5.2 mg/hr  - HOLD celecoxib   - RESTART deferasirox (4/7/20)  - LR TKO   - Received exchange transfusion 4/7     Mild persistent asthma   Well controlled on ICS/ LABA and rescue   - Budesoiide Formoterol 2 puff BID   - Albuterol inhaler PRN      Hx of CVA 2/2 sickle cell :ASA 81mg Qday        Diet: Regular Diet Adult    Fluids: None   Lines: RIJ (4/7), PIV  DVT Prophylaxis: Enoxaparin (Lovenox) SQ  Lopez Catheter: not present  Code Status: Full Code      Disposition Plan   Expected discharge: 4 - 7 days, recommended to prior living arrangement once adequate pain management/ tolerating PO medications.  Entered: Chapis Horta MD 04/08/2020, 8:50 AM       The patient's care was discussed with the Attending Physician, Dr. Ivey and Bedside Nurse.    MD Tika Schultz 5 Service  [facility], [place]  Pager: [phone]  Please see sticky note for cross cover  information  ______________________________________________________________________    Interval History   NAEO  Pt states she feels ok; states she was reluctant to undergo procedure and wanted to stop early. I reassured her her labs reflect an improvement and we are glad this is the case.   States pain is mostly at neck where internal jugular inserted.   Stooling nearly daily   Poor PO  Intake     Data reviewed today: I reviewed all medications, new labs and imaging results over the last 24 hours. I personally reviewed no images or EKG's today.    Physical Exam   Vital Signs: Temp: 97.2  F (36.2  C) Temp src: Axillary BP: 106/52 Pulse: 88 Heart Rate: 93 Resp: 16 SpO2: 95 % O2 Device: None (Room air) Oxygen Delivery: 2 LPM  Weight: 155 lbs 6.79 oz  General Appearance: Well appearing   Respiratory: clear   Cardiovascular: deferred   GI: soft, nt   Skin: wm, no significant LE edema

## 2020-04-08 NOTE — PROGRESS NOTES
Hematology Progress Note    Jennifer Cervantes MRN# [id number]   Age: 20 year old YOB: 2000     Date of Admission: 4/3/2020  Hospital day 5  Reason for consult: Sickle cell pain crisis          Assessment:     21 yo female with sickle cell disease (HbSS) complicated by frequent pain crises, history of stroke leading to significant cognitive delays and RUE hemiparesis, iron overload due to chronic transfusions, anxiety, depression, poor social support and structure at home who was admitted on 4/3 from clinic with ongoing sickle pain.     # Sickle cell disease with pain crisis  # History of stroke and cognitive delay  # Iron overload  # LFT elevation  - Recently admitted for pain, left prematurely and now readmitted for pain mainly in her back. Home regimen oxycodone 10 mg 4 tabs per day and OxyContin 10mg BID. She has been started on morphine PCA on admission. Hydroxyurea 2000 mg daily, aspirin 81 mg for post stroke prophylaxis are being continued.   - Hemoglobin dropped to 6.6 on 4/6 with LFT elevation (, , alkaline phosphatase 76 and total bilirubin 5). US abdomen was negative for any acute hepatobiliary changes.  - Ferritin was significantly elevated (15,538 on 3/30/20). She is on Jadenu 900 mg at home which has now been restarted.  - Exchange transfusion was completed on 4/7 with improvement in LFTs and reticulocyte counts. HbS is pending.         Recommendations:   - Plan for IV desferoxamine tomorrow 4/9. Duration of the infusion will be approximately 12 hours. Pharmacy is looking into whether she needs a central line for the infusion. If the infusion can be administered via peripheral IV, left internal jugular can be removed as no further exchange transfusions are planned. Appreciate pharmacy team's assistance in getting the infusion set up.   - Daily CBC, LFTs and reticulocyte count  - Pain management per primary team. On morphine PCA.  - Continue hydroxyurea 2000 mg daily, aspirin 81  mg.     We will continue to follow.     Thank you for involving us in the care of the patient. Recommendations communicated with the primary team.     Patient was seen and plan discussed with attending physician, , as well as primary hematologist Dr. Ross.    Taylor Mei  Hematology, Oncology & Transplantation fellow  Pager: [phone]  04/08/2020        HEMATOLOGY STAFF:  Seen with fellow, whose note reflects our joint evaluation, assessment, and plan.      Jose Manuel Hernandez MD  Associate Professor of Medicine  Division of Hematology, Oncology, and Transplantation  Director, [facility]              Interval History:    Exchange transfusion completed 4/7. She had chills and discomfort towards the end of the transfusion and it was stopped near the end. 1628 ml were exchanged vs. 1800 that was planned. She continues to have back pain. Denies difficulty breathing or cough. She is tired and has not been eating much due to low appetite. She denies nausea, vomiting or abdominal pain.          Review of Systems:     14-point review of systems was otherwise negative except as noted in HPI.          Past Medical History:   Past medical history was reviewed.   Past Medical History:   Diagnosis Date     Anemia      Anxiety      Bleeding disorder (H)      Blood clotting disorder (H)      Cerebral infarction (H) 2015     Cognitive developmental delay     low IQ. 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 (H)     right hand contractures     Iron overload due to repeated red blood cell transfusions      Migraines      Oppositional defiant behavior      Uncomplicated asthma              Past Surgical History:   Past surgical history was reviewed.   Past Surgical History:   Procedure Laterality Date     AS INSERT TUNNELED CV 2 CATH W/O PORT/PUMP       C  BREAST REDUCTION (INCLUDES LIPO) TIER 3 Bilateral 04/23/2019     REPAIR TENDON ELBOW Right 10/2/2019    Procedure: Right Elbow Flexor Lengthening, Flexor Pronator Slide Of Wrist and Finger, Thumb Adductor Lengthening;  Surgeon: Anai Franco MD;  Location: [facility] OR     TONSILLECTOMY Bilateral 10/2/2019    Procedure: Bilateral Tonsillectomy;  Surgeon: Farhana Guy MD;  Location: [facility] OR             Social History:   Social history was reviewed.   Social History     Tobacco Use     Smoking status: Never Smoker     Smokeless tobacco: Never Used   Substance Use Topics     Alcohol use: Not Currently     Alcohol/week: 0.0 standard drinks             Family History:   Family history was reviewed.  Family History   Problem Relation Age of Onset     Sickle Cell Trait Mother      Sickle Cell Trait Father              Allergies:     Allergies   Allergen Reactions     Fish-Derived Products Hives     Seafood Hives     Contrast Dye      Diagnostic X-Ray Materials      Gadolinium              Medications:   Medications Reviewed.   Current Facility-Administered Medications   Medication     acetaminophen (TYLENOL) tablet 975 mg     albuterol (PROVENTIL) neb solution 2.5 mg     aspirin (ASA) chewable tablet 81 mg     deferasirox (JADENU) tablet 900 mg     glucose gel 15-30 g    Or     dextrose 50 % injection 25-50 mL    Or     glucagon injection 1 mg     diphenhydrAMINE (BENADRYL) capsule 25-50 mg     enoxaparin ANTICOAGULANT (LOVENOX) injection 40 mg     fluticasone-vilanterol (BREO ELLIPTA) 100-25 MCG/INH inhaler 1 puff     gabapentin (NEURONTIN) capsule 900 mg     heparin 100 UNIT/ML injection 3 mL     heparin 100 UNIT/ML injection 3 mL     heparin 100 UNIT/ML injection 3 mL     heparin 100 UNIT/ML injection 3 mL     hydroxyurea (HYDREA) capsule 2,000 mg     hypromellose-dextran (ARTIFICAL TEARS) 0.1-0.3 % ophthalmic solution 1 drop     lidocaine (LMX4) cream     lidocaine 1 % 0.1-1 mL     melatonin tablet 3 mg  "    morphine  PCA 1 mg/mL OPIOID TOLERANT     naloxone (NARCAN) injection 0.1-0.4 mg     omeprazole (priLOSEC) CR capsule 40 mg     ondansetron (ZOFRAN-ODT) ODT tab 4 mg    Or     ondansetron (ZOFRAN) injection 4 mg     prochlorperazine (COMPAZINE) tablet 10 mg    Or     prochlorperazine (COMPAZINE) Suppository 25 mg     sennosides (SENOKOT) tablet 1 tablet     sertraline (ZOLOFT) tablet 150 mg     sodium chloride (OCEAN) 0.65 % nasal spray 1 spray     sodium chloride (PF) 0.9% PF flush 10 mL     sodium chloride (PF) 0.9% PF flush 10 mL     sodium chloride (PF) 0.9% PF flush 3 mL     sodium chloride (PF) 0.9% PF flush 3 mL             Physical Exam:   Vitals were reviewed.  Blood pressure 106/52, pulse 88, temperature 97.2  F (36.2  C), temperature source Axillary, resp. rate 16, height 1.626 m (5' 4.02\"), weight 70.5 kg (155 lb 6.8 oz), SpO2 95 %, not currently breastfeeding.    Constitutional: awake, laying in bed, appears fatigued, in NAD  HEENT: MMM, EOM intact, sclerae clear and anicteric  CV: RRR, no murmurs appreciated  Resp: Clear to auscultation bilaterally, breathing comfortably on room air, no wheezes, rhonchi  Abd: Soft, non-tender, BS+, no masses appreciated  MSK:  Warm, FROM, no joint swelling, tenderness over lower back  Skin: no rash or lesions on limited exam  Neuro: CN2-12 grossly intact, no lateralizing symptoms or focal neurologic deficits  Psych: Mood and affect WNL  Access: Left chest port, left internal jugular CVC         Data:   I/O last 3 completed shifts:  In: 280 [P.O.:240; I.V.:40]  Out: 700 [Urine:700]    ROUTINE LABS (Last four results)  CMP  Recent Labs   Lab 04/08/20  0641 04/06/20  0653 04/03/20  1020    138 138   POTASSIUM 3.7 3.8 3.6   CHLORIDE 106 105 110*   CO2 26 27 23   ANIONGAP 5 7 5   GLC 92 82 110*   BUN 2* 5* 5*   CR 0.61 0.57 0.50*   GFRESTIMATED >90 >90 >90   GFRESTBLACK >90 >90 >90   MICAH 8.3* 8.9 8.5   PROTTOTAL 6.3* 6.8 6.8   ALBUMIN 3.1* 3.6 3.5   BILITOTAL 2.9* " 5.0* 2.6*   ALKPHOS 67 76 72   AST 99* 144* 50*   * 161* 57*     CBC  Recent Labs   Lab 04/08/20  0641 04/07/20  1820 04/07/20  1630 04/07/20  0638 04/06/20  0653 04/05/20  0652   WBC 15.7*  --   --  13.6* 14.7* 13.3*   RBC 2.67*  --   --  2.18* 2.16* 2.21*   HGB 7.9* 8.1* 7.1* 6.6* 6.6* 6.8*   HCT 23.8* 23.9* 21.5* 19.9* 19.8* 20.5*   MCV 89  --   --  91 92 93   MCH 29.6  --   --  30.3 30.6 30.8   MCHC 33.2  --   --  33.2 33.3 33.2   RDW 18.4*  --   --  24.5* 23.0* 21.8*   *  --   --  203 196 176     INR  Recent Labs   Lab 04/08/20  0641 04/07/20  1321   INR 1.31* 1.28*     Arterial Blood GasNo lab results found in last 7 days.    IMAGING  Xr Chest 2 Views  Result Date: 3/29/2020  IMPRESSION: Interval increase in subtle interstitial markings at the right lung base. Differential diagnosis includes atelectasis although an early infection cannot be excluded.    Us Abdomen Limited  Result Date: 4/6/2020  IMPRESSION: Normal right upper quadrant ultrasound status post cholecystectomy.    Xr Chest Port 1 View  Result Date: 3/28/2020  IMPRESSION: Lungs clear. Previous right upper lung infiltrate resolved. Normal heart size. Left chest wall port with catheter tip in the SVC.

## 2020-04-08 NOTE — PLAN OF CARE
"/87 (BP Location: Left arm)   Pulse 106   Temp 96.9  F (36.1  C) (Oral)   Resp 18   Ht 1.626 m (5' 4.02\")   Wt 70.5 kg (155 lb 6.8 oz)   LMP  (LMP Unknown)   SpO2 99%   BMI 26.67 kg/m      VSS on RA ex pulse elevated when anxious. Pain controlled with morphine PCA. UAL. Voids spont. Tolerating reg diet with poor appetite. Passing stool/flatus. L internal jugular placed in IR today for RBC exchange. Pt exhibited signes of possible reaction so exchange was stopped early. Pt monitored closely and has been WDL since. Hgb and hematocrit showed improvement post-treatment. Port infusing TKO + morphine. Pt resting between cares. Continue POC.   "

## 2020-04-08 NOTE — PLAN OF CARE
Shift: 0700 - 1530  VS: Temp: 97.2  F (36.2  C) Temp src: Axillary BP: 106/52 Pulse: 88 Heart Rate: 93 Resp: 16 SpO2: 95 % O2 Device: None (Room air) Oxygen Delivery: 2 LPM  Pain: Low back, Left neck, reports pain manageable, Morphine pain pump in use.   Neuro: A&Ox4 w/periods of sleep. Wakes to voice. Calls appropriately. Moderate mobility deficit to right upper extremity d/t to history of CVA.   Cardiac:   Soft BP, OVSS.   Respiratory: Lung sounds clear/equal on RA.   GI/Diet/Appetite: Regular diet w/fair appetite. Patient requested to order food from outside but was told that this was currently restricted at this time d/t the COVID-19 pandemic. Instructed patient to order from the menu and offered assistance to do order. Phone and menu placed at her bedside. Last BM 4/7.   :  Voiding w/o difficulty.   LDA's: CVC to Left internal jugular, Heparin locked. Port to left chest, infusing PCA/TKO.  Skin: Intact.   Activity: Up ad janett.   Tests/Procedures:   Pertinent Labs/Lab Collection:      Plan: Continue w/POC.

## 2020-04-09 LAB
ALBUMIN SERPL-MCNC: 3.1 G/DL (ref 3.4–5)
ALP SERPL-CCNC: 68 U/L (ref 40–150)
ALT SERPL W P-5'-P-CCNC: 86 U/L (ref 0–50)
ANION GAP SERPL CALCULATED.3IONS-SCNC: 7 MMOL/L (ref 3–14)
ANISOCYTOSIS BLD QL SMEAR: ABNORMAL
AST SERPL W P-5'-P-CCNC: 76 U/L (ref 0–45)
BASOPHILS # BLD AUTO: 0.1 10E9/L (ref 0–0.2)
BASOPHILS NFR BLD AUTO: 0.9 %
BILIRUB SERPL-MCNC: 2.9 MG/DL (ref 0.2–1.3)
BUN SERPL-MCNC: 5 MG/DL (ref 7–30)
CALCIUM SERPL-MCNC: 8.5 MG/DL (ref 8.5–10.1)
CHLORIDE SERPL-SCNC: 106 MMOL/L (ref 94–109)
CO2 SERPL-SCNC: 24 MMOL/L (ref 20–32)
CREAT SERPL-MCNC: 0.56 MG/DL (ref 0.52–1.04)
DIFFERENTIAL METHOD BLD: ABNORMAL
EOSINOPHIL # BLD AUTO: 0.4 10E9/L (ref 0–0.7)
EOSINOPHIL NFR BLD AUTO: 2.8 %
ERYTHROCYTE [DISTWIDTH] IN BLOOD BY AUTOMATED COUNT: 20.4 % (ref 10–15)
GFR SERPL CREATININE-BSD FRML MDRD: >90 ML/MIN/{1.73_M2}
GLUCOSE SERPL-MCNC: 100 MG/DL (ref 70–99)
HCT VFR BLD AUTO: 24.1 % (ref 35–47)
HGB BLD-MCNC: 7.8 G/DL (ref 11.7–15.7)
LAB SCANNED RESULT: ABNORMAL
LAB SCANNED RESULT: ABNORMAL
LYMPHOCYTES # BLD AUTO: 1.9 10E9/L (ref 0.8–5.3)
LYMPHOCYTES NFR BLD AUTO: 13 %
MCH RBC QN AUTO: 30.1 PG (ref 26.5–33)
MCHC RBC AUTO-ENTMCNC: 32.4 G/DL (ref 31.5–36.5)
MCV RBC AUTO: 93 FL (ref 78–100)
MONOCYTES # BLD AUTO: 0.5 10E9/L (ref 0–1.3)
MONOCYTES NFR BLD AUTO: 3.7 %
NEUTROPHILS # BLD AUTO: 11.8 10E9/L (ref 1.6–8.3)
NEUTROPHILS NFR BLD AUTO: 79.6 %
NRBC # BLD AUTO: 5.6 10*3/UL
NRBC BLD AUTO-RTO: 38 /100
OVALOCYTES BLD QL SMEAR: SLIGHT
PLATELET # BLD AUTO: 159 10E9/L (ref 150–450)
PLATELET # BLD EST: ABNORMAL 10*3/UL
POIKILOCYTOSIS BLD QL SMEAR: ABNORMAL
POLYCHROMASIA BLD QL SMEAR: ABNORMAL
POTASSIUM SERPL-SCNC: 3.6 MMOL/L (ref 3.4–5.3)
PROT SERPL-MCNC: 6.4 G/DL (ref 6.8–8.8)
RBC # BLD AUTO: 2.59 10E12/L (ref 3.8–5.2)
RETICS # AUTO: 526.5 10E9/L (ref 25–95)
RETICS/RBC NFR AUTO: 20.3 % (ref 0.5–2)
SICKLE CELLS BLD QL SMEAR: ABNORMAL
SODIUM SERPL-SCNC: 137 MMOL/L (ref 133–144)
TARGETS BLD QL SMEAR: SLIGHT
WBC # BLD AUTO: 14.8 10E9/L (ref 4–11)

## 2020-04-09 PROCEDURE — 25000128 H RX IP 250 OP 636: Performed by: INTERNAL MEDICINE

## 2020-04-09 PROCEDURE — 25000132 ZZH RX MED GY IP 250 OP 250 PS 637: Performed by: STUDENT IN AN ORGANIZED HEALTH CARE EDUCATION/TRAINING PROGRAM

## 2020-04-09 PROCEDURE — 40000141 ZZH STATISTIC PERIPHERAL IV START W/O US GUIDANCE

## 2020-04-09 PROCEDURE — 25000128 H RX IP 250 OP 636: Performed by: STUDENT IN AN ORGANIZED HEALTH CARE EDUCATION/TRAINING PROGRAM

## 2020-04-09 PROCEDURE — 12000001 ZZH R&B MED SURG/OB UMMC

## 2020-04-09 PROCEDURE — 80053 COMPREHEN METABOLIC PANEL: CPT | Performed by: STUDENT IN AN ORGANIZED HEALTH CARE EDUCATION/TRAINING PROGRAM

## 2020-04-09 PROCEDURE — 25800030 ZZH RX IP 258 OP 636: Performed by: INTERNAL MEDICINE

## 2020-04-09 PROCEDURE — 85025 COMPLETE CBC W/AUTO DIFF WBC: CPT | Performed by: STUDENT IN AN ORGANIZED HEALTH CARE EDUCATION/TRAINING PROGRAM

## 2020-04-09 PROCEDURE — 85045 AUTOMATED RETICULOCYTE COUNT: CPT | Performed by: STUDENT IN AN ORGANIZED HEALTH CARE EDUCATION/TRAINING PROGRAM

## 2020-04-09 PROCEDURE — 99232 SBSQ HOSP IP/OBS MODERATE 35: CPT | Mod: GC | Performed by: STUDENT IN AN ORGANIZED HEALTH CARE EDUCATION/TRAINING PROGRAM

## 2020-04-09 PROCEDURE — 36592 COLLECT BLOOD FROM PICC: CPT | Performed by: STUDENT IN AN ORGANIZED HEALTH CARE EDUCATION/TRAINING PROGRAM

## 2020-04-09 RX ADMIN — ACETAMINOPHEN 975 MG: 325 TABLET, FILM COATED ORAL at 19:04

## 2020-04-09 RX ADMIN — DEFERASIROX 900 MG: 360 TABLET, FILM COATED ORAL at 09:10

## 2020-04-09 RX ADMIN — OMEPRAZOLE 40 MG: 20 CAPSULE, DELAYED RELEASE ORAL at 09:09

## 2020-04-09 RX ADMIN — ASPIRIN 81 MG CHEWABLE TABLET 81 MG: 81 TABLET CHEWABLE at 09:09

## 2020-04-09 RX ADMIN — GABAPENTIN 900 MG: 300 CAPSULE ORAL at 09:09

## 2020-04-09 RX ADMIN — SERTRALINE HYDROCHLORIDE 150 MG: 100 TABLET ORAL at 09:09

## 2020-04-09 RX ADMIN — HYDROXYUREA 2000 MG: 500 CAPSULE ORAL at 09:05

## 2020-04-09 RX ADMIN — Medication: at 23:50

## 2020-04-09 RX ADMIN — DEFEROXAMINE MESYLATE 2500 MG: 95 INJECTION, POWDER, LYOPHILIZED, FOR SOLUTION INTRAMUSCULAR; INTRAVENOUS; SUBCUTANEOUS at 12:49

## 2020-04-09 ASSESSMENT — PAIN DESCRIPTION - DESCRIPTORS
DESCRIPTORS: SHARP
DESCRIPTORS: ACHING
DESCRIPTORS: SHARP
DESCRIPTORS: SHARP

## 2020-04-09 ASSESSMENT — ACTIVITIES OF DAILY LIVING (ADL)
ADLS_ACUITY_SCORE: 12
ADLS_ACUITY_SCORE: 13
ADLS_ACUITY_SCORE: 12

## 2020-04-09 NOTE — PROGRESS NOTES
Hematology Progress Note    Jennifer Cervantes MRN# [id number]   Age: 20 year old YOB: 2000     Date of Admission: 4/3/2020  Hospital day 6  Reason for consult: Sickle cell pain crisis          Assessment:     19 yo female with sickle cell disease (HbSS) complicated by frequent pain crises, history of stroke leading to significant cognitive delays and RUE hemiparesis, iron overload due to chronic transfusions, anxiety, depression, poor social support and structure at home who was admitted on 4/3 from clinic with ongoing sickle pain.     # Sickle cell disease with pain crisis  # History of stroke and cognitive delay  # Iron overload  # LFT elevation - improved  - Recently admitted for pain, left prematurely and now readmitted for pain mainly in her back. Home regimen oxycodone 10 mg 4 tabs per day and OxyContin 10mg BID. She has been started on morphine PCA on admission. Hydroxyurea 2000 mg daily, aspirin 81 mg for post stroke prophylaxis are being continued.   - Hemoglobin dropped to 6.6 on 4/6 with LFT elevation (, , alkaline phosphatase 76 and total bilirubin 5). US abdomen was negative for any acute hepatobiliary changes.  - Ferritin was significantly elevated (15,538 on 3/30/20). She is on Jadenu 900 mg at home which has now been restarted.  - Exchange transfusion was completed on 4/7 with improvement in LFTs and reticulocyte counts. HbS is pending.  - Plan for IV desferal infusion today for iron overload         Recommendations:   - IV desferal 2500 mg infusion has been ordered. Duration of the infusion is approximately 12 hours. Appreciate pharmacy assistance.   - Please order IV benadryl 25 mg prn for itching. Low suspicion for urticaria due to slow infusion rate.  - OK to remove left internal jugular CVC as no further plans for exchange transfusions  - Daily CBC, LFTs and reticulocyte count  - On morphine PCA. OK to decrease PCA dose as she notes some improvement in pain today but we  will defer pain management to primary team.   - Continue hydroxyurea 2000 mg daily, aspirin 81 mg.     We will follow peripherally.     Recommendations communicated with the primary team.     Assessment and plan discussed with attending physician, , as well as primary hematologist Dr. Ross.    Taylor Mei  Hematology, Oncology & Transplantation fellow  Pager: [phone]  04/09/2020       HEMATOLOGY STAFF:  Seen with fellow, whose note reflects our joint evaluation, assessment, and plan.      Jose Manuel Hernandez MD  Associate Professor of Medicine  Division of Hematology, Oncology, and Transplantation  Director, [facility]                Interval History:    No acute overnight events. Pain has improved somewhat since yesterday. She continues to feel tired. Oral intake is still low but improving. She denies nausea, vomiting, abdominal pain, fever or chills. She denies sob, cough. We discussed plan for slow infusion of IV desferal today.          Review of Systems:     14-point review of systems was otherwise negative except as noted in HPI.          Past Medical History:   Past medical history was reviewed.   Past Medical History:   Diagnosis Date     Anemia      Anxiety      Bleeding disorder (H)      Blood clotting disorder (H)      Cerebral infarction (H) 2015     Cognitive developmental delay     low IQ. 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 (H)     right hand contractures     Iron overload due to repeated red blood cell transfusions      Migraines      Oppositional defiant behavior      Uncomplicated asthma              Past Surgical History:   Past surgical history was reviewed.   Past Surgical History:   Procedure Laterality Date     AS INSERT TUNNELED CV 2 CATH W/O PORT/PUMP       C BREAST REDUCTION (INCLUDES LIPO) TIER 3 Bilateral  04/23/2019     REPAIR TENDON ELBOW Right 10/2/2019    Procedure: Right Elbow Flexor Lengthening, Flexor Pronator Slide Of Wrist and Finger, Thumb Adductor Lengthening;  Surgeon: Anai Franco MD;  Location: [facility] OR     TONSILLECTOMY Bilateral 10/2/2019    Procedure: Bilateral Tonsillectomy;  Surgeon: Farhana Guy MD;  Location: [facility] OR             Social History:   Social history was reviewed.   Social History     Tobacco Use     Smoking status: Never Smoker     Smokeless tobacco: Never Used   Substance Use Topics     Alcohol use: Not Currently     Alcohol/week: 0.0 standard drinks             Family History:   Family history was reviewed.  Family History   Problem Relation Age of Onset     Sickle Cell Trait Mother      Sickle Cell Trait Father              Allergies:     Allergies   Allergen Reactions     Fish-Derived Products Hives     Seafood Hives     Contrast Dye      Diagnostic X-Ray Materials      Gadolinium              Medications:   Medications Reviewed.   Current Facility-Administered Medications   Medication     acetaminophen (TYLENOL) tablet 975 mg     albuterol (PROVENTIL) neb solution 2.5 mg     aspirin (ASA) chewable tablet 81 mg     deferasirox (JADENU) tablet 900 mg     deferoxamine (DESFERAL) 2,500 mg in sodium chloride 0.9 % 500 mL intermittent infusion     glucose gel 15-30 g    Or     dextrose 50 % injection 25-50 mL    Or     glucagon injection 1 mg     diphenhydrAMINE (BENADRYL) 25 mg in sodium chloride 0.9 % 50.5 mL intermittent infusion     diphenhydrAMINE (BENADRYL) capsule 25-50 mg     enoxaparin ANTICOAGULANT (LOVENOX) injection 40 mg     fluticasone-vilanterol (BREO ELLIPTA) 100-25 MCG/INH inhaler 1 puff     gabapentin (NEURONTIN) capsule 900 mg     heparin 100 UNIT/ML injection 3 mL     heparin 100 UNIT/ML injection 3 mL     hydroxyurea (HYDREA) capsule 2,000 mg     hypromellose-dextran (ARTIFICAL TEARS) 0.1-0.3 % ophthalmic solution 1 drop     lidocaine (LMX4)  "cream     lidocaine 1 % 0.1-1 mL     melatonin tablet 3 mg     morphine  PCA 1 mg/mL OPIOID TOLERANT     naloxone (NARCAN) injection 0.1-0.4 mg     omeprazole (priLOSEC) CR capsule 40 mg     ondansetron (ZOFRAN-ODT) ODT tab 4 mg    Or     ondansetron (ZOFRAN) injection 4 mg     prochlorperazine (COMPAZINE) tablet 10 mg    Or     prochlorperazine (COMPAZINE) Suppository 25 mg     sennosides (SENOKOT) tablet 1 tablet     sertraline (ZOLOFT) tablet 150 mg     sodium chloride (OCEAN) 0.65 % nasal spray 1 spray     sodium chloride (PF) 0.9% PF flush 3 mL     sodium chloride (PF) 0.9% PF flush 3 mL             Physical Exam:   Vitals were reviewed.  Blood pressure 109/50, pulse 95, temperature 96.8  F (36  C), temperature source Oral, resp. rate 16, height 1.626 m (5' 4.02\"), weight 70.5 kg (155 lb 6.8 oz), SpO2 93 %, not currently breastfeeding.    Constitutional: awake, laying in bed, appears fatigued, in NAD  HEENT: MMM, EOM intact, sclerae clear and anicteric  CV: RRR, no murmurs appreciated  Resp: Clear to auscultation bilaterally, breathing comfortably on room air, no wheezes, rhonchi  Abd: Soft, non-tender, BS+, no masses appreciated  MSK:  Warm, FROM, no joint swelling, tenderness over lower back  Skin: no rash or lesions on limited exam  Neuro: CN2-12 grossly intact, no lateralizing symptoms or focal neurologic deficits  Psych: Mood and affect WNL  Access: Left chest port, left internal jugular CVC         Data:   I/O last 3 completed shifts:  In: 740 [P.O.:720; I.V.:20]  Out: -     ROUTINE LABS (Last four results)  CMP  Recent Labs   Lab 04/09/20  0635 04/08/20  0641 04/06/20  0653 04/03/20  1020    138 138 138   POTASSIUM 3.6 3.7 3.8 3.6   CHLORIDE 106 106 105 110*   CO2 24 26 27 23   ANIONGAP 7 5 7 5   * 92 82 110*   BUN 5* 2* 5* 5*   CR 0.56 0.61 0.57 0.50*   GFRESTIMATED >90 >90 >90 >90   GFRESTBLACK >90 >90 >90 >90   MICAH 8.5 8.3* 8.9 8.5   PROTTOTAL 6.4* 6.3* 6.8 6.8   ALBUMIN 3.1* 3.1* 3.6 3.5 "   BILITOTAL 2.9* 2.9* 5.0* 2.6*   ALKPHOS 68 67 76 72   AST 76* 99* 144* 50*   ALT 86* 107* 161* 57*     CBC  Recent Labs   Lab 04/09/20  0635 04/08/20  0641 04/07/20  1820 04/07/20  1630 04/07/20  0638 04/06/20  0653   WBC 14.8* 15.7*  --   --  13.6* 14.7*   RBC 2.59* 2.67*  --   --  2.18* 2.16*   HGB 7.8* 7.9* 8.1* 7.1* 6.6* 6.6*   HCT 24.1* 23.8* 23.9* 21.5* 19.9* 19.8*   MCV 93 89  --   --  91 92   MCH 30.1 29.6  --   --  30.3 30.6   MCHC 32.4 33.2  --   --  33.2 33.3   RDW 20.4* 18.4*  --   --  24.5* 23.0*    146*  --   --  203 196     INR  Recent Labs   Lab 04/08/20  0641 04/07/20  1321   INR 1.31* 1.28*     Arterial Blood GasNo lab results found in last 7 days.    IMAGING  Xr Chest 2 Views  Result Date: 3/29/2020  IMPRESSION: Interval increase in subtle interstitial markings at the right lung base. Differential diagnosis includes atelectasis although an early infection cannot be excluded.    Us Abdomen Limited  Result Date: 4/6/2020  IMPRESSION: Normal right upper quadrant ultrasound status post cholecystectomy.    Xr Chest Port 1 View  Result Date: 3/28/2020  IMPRESSION: Lungs clear. Previous right upper lung infiltrate resolved. Normal heart size. Left chest wall port with catheter tip in the SVC.

## 2020-04-09 NOTE — PLAN OF CARE
"/50   Pulse 95   Temp 96.8  F (36  C) (Oral)   Resp 16   Ht 1.626 m (5' 4.02\")   Wt 70.5 kg (155 lb 6.8 oz)   LMP  (LMP Unknown)   SpO2 93%   BMI 26.67 kg/m      Patient alert and oriented x 4 and cooperative with medications but did not permit me to to a full physical assessment. Her affect is flat and she nods yes/no to questions. She would not tell me her pain description or number this morning. Morphine PCA rate was decreased from 1 mg/hr to 0.7 mg /hr and the bumps were decreased from 0.7 mg every 10 minutes to 0.5 mg. Since the changes were made, she appears relaxed and no different as compared to this morning. She received a total of 14.9 mg morphine this shift. Patient slept between all interruptions and requests to keep the room dark. A peripheral IV was placed for Desferal infusion 2,500 mg that is currently infusing over 12 hours at 41.7 ml/hr.     Refer to doc flow sheets, MAR and notes for other specifics. Continue nursing cares per care plan. Keep doctors informed of concerns/changes.   "

## 2020-04-09 NOTE — PLAN OF CARE
AOx4, lethargic at times and flat affect. AVSS, on cont pulse-ox. UAL. R arm contracted d/t previous CVA. Reg diet, pt ordered meal this shannan and tolerated well. LBM 4/7 + flatus. Voiding but pt not saving even after educated pt on need to save. R arm contracted d/t previous CVA. Port infusing with PCA Morphine and carrier fluid. Back pain managed with PCA Morphine. L IJ heparin locked; dsrg CDI. Cont POC.

## 2020-04-09 NOTE — PROVIDER NOTIFICATION
Notified provider regarding pt refusing Lovenox inj; edu pt on importance but continued to refuse.

## 2020-04-09 NOTE — PROGRESS NOTES
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 pls encourage as she is high risk given prior CVA and not ambulating  - IV desferal today  Dispo: Pending po meds and pain control     Acute on chronic Anemia   Sickle cell vasoocclusive crisis   HbSS  Recent hospitalization 3/27 - 3/29 for same but left prematurely. On admit retic elevated to 625, hgb and tbili stable. No sign of acute checst of infectious although does have persistent mild leukocytosis  Care plan:   Morphine PCA setup as follow:  PREVIOUSLY as of 4/8/20 - 1 mg/hr PCA: 0.7 mg Interval timing: 10 minutes Max: 5.2 mg/hr  CHANGE TO 4/9/20 - 0.7mg/h continuous and 0.5mg Q10min bolus dose. Discussed with patient  - HOLD celecoxib   - RESTART deferasirox (4/7/20)  - LR TKO   - Received exchange transfusion 4/7  - Desferal 4/9/20   - IV benadryl ordered for use with desferal IF itching.      Mild persistent asthma   Well controlled on ICS/ LABA and rescue   - Budesoiide Formoterol 2 puff BID   - Albuterol inhaler PRN      Hx of CVA 2/2 sickle cell :ASA 81mg Qday      Diet: Regular Diet Adult  Snacks/Supplements Adult: Other; Can order per patient wishes; Between Meals    Fluids: None  Lines: PIV, RIJ  DVT Prophylaxis: Enoxaparin (Lovenox) subcutaneous - aware patietn refusing. Attempt education.   Lopez Catheter: not present  Code Status: Full Code      Disposition Plan   Expected discharge: 4 - 7 days, recommended to prior living arrangement once adequate pain management/ tolerating PO medications.  Entered: Chapis Horta MD 04/09/2020, 11:18 AM      The patient's care was discussed with the Attending Physician, Dr. Ivey, Bedside Nurse and Patient.    Chapis Horta MD  [address], [place]  Pager: [phone]  Please see sticky note for cross cover information  ______________________________________________________________________    Interval History   NAEO  Pt sleepy. Did not want to talk much   States she will chat later on.   Endorses neck pain on the sides, but doesn't want to move her neck for me     Data reviewed today: I reviewed all medications, new labs and imaging results over the last 24 hours. I personally reviewed no images or EKG's today.    Physical Exam   Vital Signs: Temp: 96.8  F (36  C) Temp src: Oral BP: 109/50 Pulse: 95 Heart Rate: 54 Resp: 16 SpO2: 93 % O2 Device: None (Room air)    Weight: 155 lbs 6.79 oz  General Appearance: Ill, resting. Not extremely conversational.   Other: pt would not allow me to examine the remainder of hr body at this time

## 2020-04-10 LAB
ALBUMIN SERPL-MCNC: 3.3 G/DL (ref 3.4–5)
ALP SERPL-CCNC: 69 U/L (ref 40–150)
ALT SERPL W P-5'-P-CCNC: 76 U/L (ref 0–50)
ANION GAP SERPL CALCULATED.3IONS-SCNC: 5 MMOL/L (ref 3–14)
ANISOCYTOSIS BLD QL SMEAR: ABNORMAL
AST SERPL W P-5'-P-CCNC: 64 U/L (ref 0–45)
BASOPHILS # BLD AUTO: 0 10E9/L (ref 0–0.2)
BASOPHILS NFR BLD AUTO: 0 %
BILIRUB SERPL-MCNC: 2.3 MG/DL (ref 0.2–1.3)
BUN SERPL-MCNC: 7 MG/DL (ref 7–30)
CALCIUM SERPL-MCNC: 8.5 MG/DL (ref 8.5–10.1)
CHLORIDE SERPL-SCNC: 108 MMOL/L (ref 94–109)
CO2 SERPL-SCNC: 24 MMOL/L (ref 20–32)
CREAT SERPL-MCNC: 0.56 MG/DL (ref 0.52–1.04)
DACRYOCYTES BLD QL SMEAR: SLIGHT
DIFFERENTIAL METHOD BLD: ABNORMAL
EOSINOPHIL # BLD AUTO: 0.6 10E9/L (ref 0–0.7)
EOSINOPHIL NFR BLD AUTO: 5.3 %
ERYTHROCYTE [DISTWIDTH] IN BLOOD BY AUTOMATED COUNT: 18.8 % (ref 10–15)
GFR SERPL CREATININE-BSD FRML MDRD: >90 ML/MIN/{1.73_M2}
GLUCOSE SERPL-MCNC: 85 MG/DL (ref 70–99)
HCT VFR BLD AUTO: 24.5 % (ref 35–47)
HGB BLD-MCNC: 7.8 G/DL (ref 11.7–15.7)
LYMPHOCYTES # BLD AUTO: 2.1 10E9/L (ref 0.8–5.3)
LYMPHOCYTES NFR BLD AUTO: 19.5 %
MCH RBC QN AUTO: 29.5 PG (ref 26.5–33)
MCHC RBC AUTO-ENTMCNC: 31.8 G/DL (ref 31.5–36.5)
MCV RBC AUTO: 93 FL (ref 78–100)
MONOCYTES # BLD AUTO: 0.6 10E9/L (ref 0–1.3)
MONOCYTES NFR BLD AUTO: 5.3 %
NEUTROPHILS # BLD AUTO: 7.5 10E9/L (ref 1.6–8.3)
NEUTROPHILS NFR BLD AUTO: 69.9 %
NRBC # BLD AUTO: 3.5 10*3/UL
NRBC BLD AUTO-RTO: 33 /100
PLATELET # BLD AUTO: 172 10E9/L (ref 150–450)
PLATELET # BLD EST: ABNORMAL 10*3/UL
POIKILOCYTOSIS BLD QL SMEAR: ABNORMAL
POTASSIUM SERPL-SCNC: 3.8 MMOL/L (ref 3.4–5.3)
PROT SERPL-MCNC: 6.6 G/DL (ref 6.8–8.8)
RBC # BLD AUTO: 2.64 10E12/L (ref 3.8–5.2)
RBC INCLUSIONS BLD: SLIGHT
RETICS # AUTO: 637 10E9/L (ref 25–95)
RETICS/RBC NFR AUTO: 24.1 % (ref 0.5–2)
SICKLE CELLS BLD QL SMEAR: SLIGHT
SODIUM SERPL-SCNC: 137 MMOL/L (ref 133–144)
TARGETS BLD QL SMEAR: SLIGHT
WBC # BLD AUTO: 10.8 10E9/L (ref 4–11)

## 2020-04-10 PROCEDURE — 99232 SBSQ HOSP IP/OBS MODERATE 35: CPT | Mod: GC | Performed by: STUDENT IN AN ORGANIZED HEALTH CARE EDUCATION/TRAINING PROGRAM

## 2020-04-10 PROCEDURE — 25000128 H RX IP 250 OP 636: Performed by: STUDENT IN AN ORGANIZED HEALTH CARE EDUCATION/TRAINING PROGRAM

## 2020-04-10 PROCEDURE — 12000001 ZZH R&B MED SURG/OB UMMC

## 2020-04-10 PROCEDURE — 85045 AUTOMATED RETICULOCYTE COUNT: CPT | Performed by: STUDENT IN AN ORGANIZED HEALTH CARE EDUCATION/TRAINING PROGRAM

## 2020-04-10 PROCEDURE — 80053 COMPREHEN METABOLIC PANEL: CPT | Performed by: STUDENT IN AN ORGANIZED HEALTH CARE EDUCATION/TRAINING PROGRAM

## 2020-04-10 PROCEDURE — 25000132 ZZH RX MED GY IP 250 OP 250 PS 637: Performed by: STUDENT IN AN ORGANIZED HEALTH CARE EDUCATION/TRAINING PROGRAM

## 2020-04-10 PROCEDURE — 25000125 ZZHC RX 250: Performed by: STUDENT IN AN ORGANIZED HEALTH CARE EDUCATION/TRAINING PROGRAM

## 2020-04-10 PROCEDURE — 85025 COMPLETE CBC W/AUTO DIFF WBC: CPT | Performed by: STUDENT IN AN ORGANIZED HEALTH CARE EDUCATION/TRAINING PROGRAM

## 2020-04-10 PROCEDURE — 36592 COLLECT BLOOD FROM PICC: CPT | Performed by: STUDENT IN AN ORGANIZED HEALTH CARE EDUCATION/TRAINING PROGRAM

## 2020-04-10 RX ORDER — HEPARIN SODIUM (PORCINE) LOCK FLUSH IV SOLN 100 UNIT/ML 100 UNIT/ML
5 SOLUTION INTRAVENOUS
Status: DISCONTINUED | OUTPATIENT
Start: 2020-04-10 | End: 2020-04-12 | Stop reason: HOSPADM

## 2020-04-10 RX ORDER — HEPARIN SODIUM,PORCINE 10 UNIT/ML
5-10 VIAL (ML) INTRAVENOUS EVERY 24 HOURS
Status: DISCONTINUED | OUTPATIENT
Start: 2020-04-10 | End: 2020-04-12 | Stop reason: HOSPADM

## 2020-04-10 RX ORDER — HEPARIN SODIUM,PORCINE 10 UNIT/ML
5-10 VIAL (ML) INTRAVENOUS
Status: DISCONTINUED | OUTPATIENT
Start: 2020-04-10 | End: 2020-04-12 | Stop reason: HOSPADM

## 2020-04-10 RX ORDER — OXYCODONE HCL 10 MG/1
10 TABLET, FILM COATED, EXTENDED RELEASE ORAL EVERY 12 HOURS
Status: DISCONTINUED | OUTPATIENT
Start: 2020-04-10 | End: 2020-04-12 | Stop reason: HOSPADM

## 2020-04-10 RX ORDER — MORPHINE SULFATE 2 MG/ML
1-2 INJECTION, SOLUTION INTRAMUSCULAR; INTRAVENOUS
Status: DISCONTINUED | OUTPATIENT
Start: 2020-04-10 | End: 2020-04-11

## 2020-04-10 RX ORDER — MORPHINE SULFATE 2 MG/ML
0.7 INJECTION, SOLUTION INTRAMUSCULAR; INTRAVENOUS
Status: DISCONTINUED | OUTPATIENT
Start: 2020-04-10 | End: 2020-04-10

## 2020-04-10 RX ADMIN — DEFERASIROX 900 MG: 360 TABLET, FILM COATED ORAL at 09:30

## 2020-04-10 RX ADMIN — MORPHINE SULFATE 1 MG: 2 INJECTION, SOLUTION INTRAMUSCULAR; INTRAVENOUS at 13:52

## 2020-04-10 RX ADMIN — OXYCODONE HYDROCHLORIDE 10 MG: 10 TABLET, FILM COATED, EXTENDED RELEASE ORAL at 12:17

## 2020-04-10 RX ADMIN — OXYCODONE HYDROCHLORIDE 10 MG: 10 TABLET, FILM COATED, EXTENDED RELEASE ORAL at 23:01

## 2020-04-10 RX ADMIN — SERTRALINE HYDROCHLORIDE 150 MG: 100 TABLET ORAL at 09:30

## 2020-04-10 RX ADMIN — LIDOCAINE: 40 CREAM TOPICAL at 21:27

## 2020-04-10 RX ADMIN — ASPIRIN 81 MG CHEWABLE TABLET 81 MG: 81 TABLET CHEWABLE at 09:30

## 2020-04-10 RX ADMIN — HYDROXYUREA 2000 MG: 500 CAPSULE ORAL at 09:31

## 2020-04-10 RX ADMIN — OMEPRAZOLE 40 MG: 20 CAPSULE, DELAYED RELEASE ORAL at 09:31

## 2020-04-10 RX ADMIN — MORPHINE SULFATE 1 MG: 2 INJECTION, SOLUTION INTRAMUSCULAR; INTRAVENOUS at 17:36

## 2020-04-10 RX ADMIN — MORPHINE SULFATE 2 MG: 2 INJECTION, SOLUTION INTRAMUSCULAR; INTRAVENOUS at 21:27

## 2020-04-10 RX ADMIN — Medication 5 ML: at 23:01

## 2020-04-10 ASSESSMENT — ACTIVITIES OF DAILY LIVING (ADL)
ADLS_ACUITY_SCORE: 13

## 2020-04-10 NOTE — PLAN OF CARE
PCA discontinued this shift, restarted OxyContin and Morphine Sulfate IV for breakthrough pain, c/o lower back and neck pain.Fair appetite, started on calorie counts, labile mood, refused to ambulate, voiding not saving.Non compliant with medications and cares at times, patient prefers room dark.

## 2020-04-10 NOTE — PROGRESS NOTES
Hematology Progress Note    Jennifer Cervantes MRN# [id number]   Age: 20 year old YOB: 2000     Date of Admission: 4/3/2020  Hospital day 7  Reason for consult: Sickle cell pain crisis          Assessment:     21 yo female with sickle cell disease (HbSS) complicated by frequent pain crises, history of stroke leading to significant cognitive delays and RUE hemiparesis, iron overload due to chronic transfusions, anxiety, depression, poor social support and structure at home who was admitted on 4/3 from clinic with ongoing sickle pain.     # Sickle cell disease with pain crisis  # History of stroke and cognitive delay  # Iron overload  # LFT elevation - improved  - Recently admitted for pain, left prematurely and now readmitted for pain mainly in her back. Home regimen oxycodone 10 mg 4 tabs per day and OxyContin 10mg BID. She has been started on morphine PCA on admission. Hydroxyurea 2000 mg daily, aspirin 81 mg for post stroke prophylaxis are being continued.   - Hemoglobin dropped to 6.6 on 4/6 with LFT elevation (, , alkaline phosphatase 76 and total bilirubin 5). US abdomen was negative for any acute hepatobiliary changes.  - Ferritin was significantly elevated (15,538 on 3/30/20). She is on Jadenu 900 mg at home which has now been restarted.  - Exchange transfusion was completed on 4/7 with improvement in LFTs and reticulocyte counts. HbS 68>21.3. Left internal jugular CVC removed.   - Completed IV desferal infusion on 4/9  - Pain has improved today, switched to oral pain medications         Recommendations:   - Agree with switching to oral pain medications. On oxycodone 10 mg q12h and IV 1-2 mg morphine q1h prn.   - If she continues to feel well with improvement in pain, may be able to discharge in the next 1-2 days however, will defer any discharge decisions to the primary team  - Primary hematologist Dr. Duncan is planning to refill her pain medications prior to discharge. Please ensure  that she has adequate pain meds/script available at discharge.   - Continue hydroxyurea 2000 mg daily, aspirin 81 mg.     We will follow peripherally over the weekend.     Recommendations communicated with the primary team.     Assessment and plan discussed with attending physician, , as well as primary hematologist Dr. Ross.    Taylor Garciaprasad  Hematology, Oncology & Transplantation fellow  Pager: [phone]  04/10/2020             Interval History:    No acute overnight events. Successfully completed IV desferal infusion. Pain has improved and she is acceptable to switching to oral medications. Oral intake is improving.          Review of Systems:     14-point review of systems was otherwise negative except as noted in HPI.          Past Medical History:   Past medical history was reviewed.   Past Medical History:   Diagnosis Date     Anemia      Anxiety      Bleeding disorder (H)      Blood clotting disorder (H)      Cerebral infarction (H) 2015     Cognitive developmental delay     low IQ. 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 (H)     right hand contractures     Iron overload due to repeated red blood cell transfusions      Migraines      Oppositional defiant behavior      Uncomplicated asthma              Past Surgical History:   Past surgical history was reviewed.   Past Surgical History:   Procedure Laterality Date     AS INSERT TUNNELED CV 2 CATH W/O PORT/PUMP       C BREAST REDUCTION (INCLUDES LIPO) TIER 3 Bilateral 04/23/2019     REPAIR TENDON ELBOW Right 10/2/2019    Procedure: Right Elbow Flexor Lengthening, Flexor Pronator Slide Of Wrist and Finger, Thumb Adductor Lengthening;  Surgeon: Anai Franco MD;  Location: [facility] OR     TONSILLECTOMY Bilateral 10/2/2019    Procedure: Bilateral Tonsillectomy;  Surgeon: Farhana Guy MD;   Location: [facility] OR             Social History:   Social history was reviewed.   Social History     Tobacco Use     Smoking status: Never Smoker     Smokeless tobacco: Never Used   Substance Use Topics     Alcohol use: Not Currently     Alcohol/week: 0.0 standard drinks             Family History:   Family history was reviewed.  Family History   Problem Relation Age of Onset     Sickle Cell Trait Mother      Sickle Cell Trait Father              Allergies:     Allergies   Allergen Reactions     Fish-Derived Products Hives     Seafood Hives     Contrast Dye      Diagnostic X-Ray Materials      Gadolinium              Medications:   Medications Reviewed.   Current Facility-Administered Medications   Medication     acetaminophen (TYLENOL) tablet 975 mg     albuterol (PROVENTIL) neb solution 2.5 mg     aspirin (ASA) chewable tablet 81 mg     deferasirox (JADENU) tablet 900 mg     glucose gel 15-30 g    Or     dextrose 50 % injection 25-50 mL    Or     glucagon injection 1 mg     diphenhydrAMINE (BENADRYL) capsule 25-50 mg     enoxaparin ANTICOAGULANT (LOVENOX) injection 40 mg     fluticasone-vilanterol (BREO ELLIPTA) 100-25 MCG/INH inhaler 1 puff     gabapentin (NEURONTIN) capsule 900 mg     hydroxyurea (HYDREA) capsule 2,000 mg     hypromellose-dextran (ARTIFICAL TEARS) 0.1-0.3 % ophthalmic solution 1 drop     lidocaine (LMX4) cream     lidocaine 1 % 0.1-1 mL     melatonin tablet 3 mg     morphine (PF) injection 1-2 mg     naloxone (NARCAN) injection 0.1-0.4 mg     omeprazole (priLOSEC) CR capsule 40 mg     ondansetron (ZOFRAN-ODT) ODT tab 4 mg    Or     ondansetron (ZOFRAN) injection 4 mg     oxyCODONE (oxyCONTIN) 12 hr tablet 10 mg     prochlorperazine (COMPAZINE) tablet 10 mg    Or     prochlorperazine (COMPAZINE) Suppository 25 mg     sennosides (SENOKOT) tablet 1 tablet     sertraline (ZOLOFT) tablet 150 mg     sodium chloride (OCEAN) 0.65 % nasal spray 1 spray     sodium chloride (PF) 0.9% PF flush 3 mL     sodium  "chloride (PF) 0.9% PF flush 3 mL             Physical Exam:   Vitals were reviewed.  Blood pressure 110/55, pulse 93, temperature 99  F (37.2  C), temperature source Oral, resp. rate 16, height 1.626 m (5' 4.02\"), weight 70.5 kg (155 lb 6.8 oz), SpO2 96 %, not currently breastfeeding.    Constitutional: awake, laying in bed, in NAD  HEENT: MMM, EOM intact, sclerae clear and anicteric  CV: RRR, no murmurs appreciated  Resp: Clear to auscultation bilaterally, breathing comfortably on room air, no wheezes, rhonchi  Abd: Soft, non-tender, BS+, no masses appreciated  MSK:  Warm, FROM, no joint swelling  Skin: no rash or lesions on limited exam  Neuro: CN2-12 grossly intact, no lateralizing symptoms or focal neurologic deficits  Psych: Mood and affect WNL  Access: Left chest port         Data:   I/O last 3 completed shifts:  In: 260 [P.O.:240; I.V.:20]  Out: 900 [Urine:900]    ROUTINE LABS (Last four results)  CMP  Recent Labs   Lab 04/10/20  0643 04/09/20  0635 04/08/20  0641 04/06/20  0653    137 138 138   POTASSIUM 3.8 3.6 3.7 3.8   CHLORIDE 108 106 106 105   CO2 24 24 26 27   ANIONGAP 5 7 5 7   GLC 85 100* 92 82   BUN 7 5* 2* 5*   CR 0.56 0.56 0.61 0.57   GFRESTIMATED >90 >90 >90 >90   GFRESTBLACK >90 >90 >90 >90   MICAH 8.5 8.5 8.3* 8.9   PROTTOTAL 6.6* 6.4* 6.3* 6.8   ALBUMIN 3.3* 3.1* 3.1* 3.6   BILITOTAL 2.3* 2.9* 2.9* 5.0*   ALKPHOS 69 68 67 76   AST 64* 76* 99* 144*   ALT 76* 86* 107* 161*     CBC  Recent Labs   Lab 04/10/20  0643 04/09/20  0635 04/08/20  0641 04/07/20  1820  04/07/20  0638   WBC 10.8 14.8* 15.7*  --   --  13.6*   RBC 2.64* 2.59* 2.67*  --   --  2.18*   HGB 7.8* 7.8* 7.9* 8.1*   < > 6.6*   HCT 24.5* 24.1* 23.8* 23.9*   < > 19.9*   MCV 93 93 89  --   --  91   MCH 29.5 30.1 29.6  --   --  30.3   MCHC 31.8 32.4 33.2  --   --  33.2   RDW 18.8* 20.4* 18.4*  --   --  24.5*    159 146*  --   --  203    < > = values in this interval not displayed.     INR  Recent Labs   Lab 04/08/20  0641 " 04/07/20  1321   INR 1.31* 1.28*     Arterial Blood GasNo lab results found in last 7 days.    IMAGING  Xr Chest 2 Views  Result Date: 3/29/2020  IMPRESSION: Interval increase in subtle interstitial markings at the right lung base. Differential diagnosis includes atelectasis although an early infection cannot be excluded.    Us Abdomen Limited  Result Date: 4/6/2020  IMPRESSION: Normal right upper quadrant ultrasound status post cholecystectomy.    Xr Chest Port 1 View  Result Date: 3/28/2020  IMPRESSION: Lungs clear. Previous right upper lung infiltrate resolved. Normal heart size. Left chest wall port with catheter tip in the SVC.

## 2020-04-10 NOTE — PROGRESS NOTES
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 If requiring high frequency of IV then will consider increasing PO. D/W heme   - OK to remove internal jugular (patient care order in place. Pt refusing to have this done in AM)   Dispo: Pending improved pain plan and PO meds     Acute on chronic Anemia   Sickle cell vasoocclusive crisis   HbSS  Recent hospitalization 3/27 - 3/29 for same but left prematurely. On admit retic elevated to 625, hgb and tbili stable. No sign of acute checst of infectious although does have persistent mild leukocytosis.   PAIN:   SWITCH from PCA to oral agents as below   PREVIOUSLY: 4/9/20 - 0.7mg/h continuous and 0.5mg Q10min bolus dose.   SWITCH TO: Oxycontin 10mg Q12h and Morphine 1-2 mg Q1h PRN.   - If requiring large amts of IV for PRN, will add PO Oxycodone 5-10mg Q6h PRN as awell.   - HOLD celecoxib   - RESTART deferasirox (4/7/20)  - Received exchange transfusion 4/7, Desferal 4/9/20      Mild persistent asthma   Well controlled on ICS/ LABA and rescue   - Budesoiide Formoterol 2 puff BID   - Albuterol inhaler PRN      Hx of CVA 2/2 sickle cell :ASA 81mg Qday   Depression: Suspect such. Irritable and seems withdrawn in affect. Can consider screening for such however pt very unwilling to discuss further with me this morning wishing to be left alone.      Diet: Regular Diet Adult  Snacks/Supplements Adult: Other; Can order per patient wishes; Between Meals    Fluids: None   Lines: PIV  DVT Prophylaxis: Enoxaparin (Lovenox) SQ  Lopez Catheter: not present  Code Status: Full Code   "    Disposition Plan   Expected discharge: 2 - 3 days, recommended to prior living arrangement once adequate pain management/ tolerating PO medications.  Entered: Chapis Horta MD 04/10/2020, 12:50 PM       The patient's care was discussed with the Attending Physician, Dr. Chiu, Patient and Hematology Consultant.    Chapis Horta MD  [address], [place]  Pager: [phone]  Please see sticky note for cross cover information  ______________________________________________________________________    Interval History   NAEO   Pt very withdrawn and flat affect. States \"we willdo whatever we want anyway,\" I reassured her that our job was to discuss with her and make a plan together bu switching off the PCA would be beneficial for her as she wouldn't be hooked up to an IV ppole and we could work toward going home.   Also stated the line in neck could be pulled. SHe refused this morning but stated she would consider it later on. I encouraged her to allow the nurses to do this today.   States she is havng neck pain and back pain.     Data reviewed today: I reviewed all medications, new labs and imaging results over the last 24 hours. I personally reviewed no images or EKG's today.    Physical Exam   Vital Signs: Temp: 96.9  F (36.1  C) Temp src: Oral BP: 97/45   Heart Rate: 83 Resp: 16 SpO2: 98 % O2 Device: None (Room air)    Weight: 155 lbs 6.79 oz  General Appearance: Ill appearing, refusing for me to exmaine her closely and curled up in bed    Resp: Comfortable on room air   LE: no significant edema   Pscyh: Mood is labile and afect is quite flat.   "

## 2020-04-10 NOTE — PROGRESS NOTES
"CLINICAL NUTRITION SERVICES - ASSESSMENT NOTE     Nutrition Prescription    RECOMMENDATIONS FOR MDs/PROVIDERS TO ORDER:  None at this time     Malnutrition Status:    Unable to determine due to unable to complete all parameters of malnutrition validation    Recommendations already ordered by Registered Dietitian (RD):  1. Boost Plus BID @ 10 and 2  2. Calorie Counts    Future/Additional Recommendations:  Recommend patient be able to consistently consume at least 1000 kcal and 40 g protein daily (~60% estimated kcal and protein needs) vs need for additional nutrition intervention (supplements, snacks, education, and/or nutrition support if appropriate.     Unable to obtain in-person nutrition history or nutrition focused physical assessment (NFPA) from patient as the number of staff going into rooms is restricted to limit exposure and to minimize use of PPE.     REASON FOR ASSESSMENT  Jennifer Cervantes is a/an 20 year old female assessed by the dietitian for [facility]    NUTRITION HISTORY  Obtained information from chart, pt unavailable during attempts to call.  Per chart, pt with fish and seafood allergy.  PMH includes sickle cell disease, and hx of CVA with residual R sided hemiparesis and cognitive delay.    CURRENT NUTRITION ORDERS  Diet: Regular + PRN supplements  Intake/Tolerance: tolerating diet, but poor appetite.  Has been ordering Boost with meals    LABS  Labs reviewed    MEDICATIONS  Medications reviewed    ANTHROPOMETRICS  Height: 162.6 cm (5' 4.016\")  Most Recent Weight: 70.5 kg (155 lb 6.8 oz)    IBW: 54.5 kg   BMI: Overweight BMI 25-29.9  Weight History:   Wt Readings from Last 10 Encounters:   04/07/20 70.5 kg (155 lb 6.8 oz)   03/31/20 70.5 kg (155 lb 8 oz)   03/30/20 70.2 kg (154 lb 12.8 oz)   03/28/20 69.6 kg (153 lb 8 oz)   03/13/20 68.9 kg (152 lb)   02/28/20 70.2 kg (154 lb 12.8 oz)   10/24/19 66.2 kg (146 lb)   10/10/19 71.7 kg (158 lb 1.1 oz)   09/25/19 65.4 kg (144 lb 2.9 oz)   09/05/19 65.3 kg (144 " lb)      Dosing Weight: 59 kg (adjusted based on actual wt 70.5 kg and IBW)    ASSESSED NUTRITION NEEDS  Estimated Energy Needs: 8415-9698 kcals/day (25 - 30 kcals/kg)  Justification: Maintenance  Estimated Protein Needs: 59-71 grams protein/day (1 - 1.2 grams of pro/kg)  Justification: Hypercatabolism with acute illness  Estimated Fluid Needs: 1042-6640 mL/day (1 mL/kcal)   Justification: Maintenance, or other per provider pending fluid status    PHYSICAL FINDINGS  See malnutrition section below.    MALNUTRITION  % Intake: Unable to assess  % Weight Loss: None noted  Subcutaneous Fat Loss: Unable to assess, available per MD/provider request  Muscle Loss: Unable to assess, available per MD/provider request  Fluid Accumulation/Edema: None noted  Malnutrition Diagnosis: Unable to determine due to unable to complete all parameters of malnutrition validation    NUTRITION DIAGNOSIS  Inadequate oral intake related to decreased appetite as evidenced by poor-fair appetite since admission 7 days ago    INTERVENTIONS  Implementation  Nutrition Education: Unable to complete due to pt unavailable  Medical food supplement therapy   Calorie Counts    Goals  Total avg nutritional intake to meet a minimum of 25 kcal/kg and 1 g PRO/kg daily (per dosing wt 59 kg).     Monitoring/Evaluation  Progress toward goals will be monitored and evaluated per protocol.     Urszula Myles, YOLANDA, LD  [address] pager: [phone]

## 2020-04-10 NOTE — PLAN OF CARE
AOx4, flat affect and noncompliant with cares at time. VSS on ra; cont pulse oximeter in place. Tolerated reg diet this shannan. +flatus, no BM on shift. Denies N/V. Voiding but refusing to save. R arm contracted d/t previous CVA. Pt refused scheduled Lovenox this shannan; edu pt and continued to refuse, providers aware. Back pain managed with PCA Morphine; pt refused scheduled Gabapentin. PRN Tylenol given for neck pain. Port infusing with PCA Morphine. PIV infusing with Desferal. Cont POC.

## 2020-04-10 NOTE — PLAN OF CARE
Shift: 6203-7141    Neuro: Alert and oriented x4, flat/withdrawn affect, pleasant overnight, able to make needs known  Cardiac/VS: VSS  Respiratory: RA  GI: Denies N/V, LBM 4/7  Diet/Appetite: Regular   : Voids spontaneously. Reports urgency, 1x incontinence overnight  Activity: Up independently   Pain: Neck and low back pain currrently managed with PCA morphine  Skin: No new deficits   LDA(s): Chest port with NS @ TKO and PCA, R forearm PIV SL       Plan: Continue plan of care

## 2020-04-11 LAB
ALBUMIN SERPL-MCNC: 3.4 G/DL (ref 3.4–5)
ALP SERPL-CCNC: 71 U/L (ref 40–150)
ALT SERPL W P-5'-P-CCNC: 70 U/L (ref 0–50)
ANION GAP SERPL CALCULATED.3IONS-SCNC: 9 MMOL/L (ref 3–14)
ANISOCYTOSIS BLD QL SMEAR: SLIGHT
AST SERPL W P-5'-P-CCNC: 62 U/L (ref 0–45)
BASO STIPL BLD QL SMEAR: PRESENT
BASOPHILS # BLD AUTO: 0.1 10E9/L (ref 0–0.2)
BASOPHILS NFR BLD AUTO: 0.9 %
BILIRUB SERPL-MCNC: 2.3 MG/DL (ref 0.2–1.3)
BUN SERPL-MCNC: 8 MG/DL (ref 7–30)
BURR CELLS BLD QL SMEAR: SLIGHT
CALCIUM SERPL-MCNC: 9.1 MG/DL (ref 8.5–10.1)
CHLORIDE SERPL-SCNC: 108 MMOL/L (ref 94–109)
CO2 SERPL-SCNC: 19 MMOL/L (ref 20–32)
CREAT SERPL-MCNC: 0.58 MG/DL (ref 0.52–1.04)
DIFFERENTIAL METHOD BLD: ABNORMAL
ELLIPTOCYTES BLD QL SMEAR: SLIGHT
EOSINOPHIL # BLD AUTO: 0.2 10E9/L (ref 0–0.7)
EOSINOPHIL NFR BLD AUTO: 1.7 %
ERYTHROCYTE [DISTWIDTH] IN BLOOD BY AUTOMATED COUNT: 17.7 % (ref 10–15)
GFR SERPL CREATININE-BSD FRML MDRD: >90 ML/MIN/{1.73_M2}
GLUCOSE SERPL-MCNC: 82 MG/DL (ref 70–99)
HCT VFR BLD AUTO: 26.8 % (ref 35–47)
HGB BLD-MCNC: 8.3 G/DL (ref 11.7–15.7)
LYMPHOCYTES # BLD AUTO: 2.7 10E9/L (ref 0.8–5.3)
LYMPHOCYTES NFR BLD AUTO: 24.1 %
MCH RBC QN AUTO: 29.6 PG (ref 26.5–33)
MCHC RBC AUTO-ENTMCNC: 31 G/DL (ref 31.5–36.5)
MCV RBC AUTO: 96 FL (ref 78–100)
MONOCYTES # BLD AUTO: 0.8 10E9/L (ref 0–1.3)
MONOCYTES NFR BLD AUTO: 6.9 %
NEUTROPHILS # BLD AUTO: 7.4 10E9/L (ref 1.6–8.3)
NEUTROPHILS NFR BLD AUTO: 66.4 %
NRBC # BLD AUTO: 2.8 10*3/UL
NRBC BLD AUTO-RTO: 25 /100
OVALOCYTES BLD QL SMEAR: ABNORMAL
PLATELET # BLD AUTO: 197 10E9/L (ref 150–450)
PLATELET # BLD EST: ABNORMAL 10*3/UL
POIKILOCYTOSIS BLD QL SMEAR: ABNORMAL
POLYCHROMASIA BLD QL SMEAR: SLIGHT
POTASSIUM SERPL-SCNC: 3.8 MMOL/L (ref 3.4–5.3)
PROT SERPL-MCNC: 7.2 G/DL (ref 6.8–8.8)
RBC # BLD AUTO: 2.8 10E12/L (ref 3.8–5.2)
RETICS # AUTO: 690.2 10E9/L (ref 25–95)
RETICS/RBC NFR AUTO: 24.7 % (ref 0.5–2)
SODIUM SERPL-SCNC: 136 MMOL/L (ref 133–144)
TARGETS BLD QL SMEAR: ABNORMAL
WBC # BLD AUTO: 11.1 10E9/L (ref 4–11)

## 2020-04-11 PROCEDURE — 99232 SBSQ HOSP IP/OBS MODERATE 35: CPT | Mod: GC | Performed by: STUDENT IN AN ORGANIZED HEALTH CARE EDUCATION/TRAINING PROGRAM

## 2020-04-11 PROCEDURE — 25000128 H RX IP 250 OP 636: Performed by: STUDENT IN AN ORGANIZED HEALTH CARE EDUCATION/TRAINING PROGRAM

## 2020-04-11 PROCEDURE — 25000132 ZZH RX MED GY IP 250 OP 250 PS 637: Performed by: STUDENT IN AN ORGANIZED HEALTH CARE EDUCATION/TRAINING PROGRAM

## 2020-04-11 PROCEDURE — 36415 COLL VENOUS BLD VENIPUNCTURE: CPT | Performed by: STUDENT IN AN ORGANIZED HEALTH CARE EDUCATION/TRAINING PROGRAM

## 2020-04-11 PROCEDURE — 12000001 ZZH R&B MED SURG/OB UMMC

## 2020-04-11 PROCEDURE — 85045 AUTOMATED RETICULOCYTE COUNT: CPT | Performed by: STUDENT IN AN ORGANIZED HEALTH CARE EDUCATION/TRAINING PROGRAM

## 2020-04-11 PROCEDURE — 85025 COMPLETE CBC W/AUTO DIFF WBC: CPT | Performed by: STUDENT IN AN ORGANIZED HEALTH CARE EDUCATION/TRAINING PROGRAM

## 2020-04-11 PROCEDURE — 80053 COMPREHEN METABOLIC PANEL: CPT | Performed by: STUDENT IN AN ORGANIZED HEALTH CARE EDUCATION/TRAINING PROGRAM

## 2020-04-11 RX ORDER — OXYCODONE HYDROCHLORIDE 5 MG/1
5-10 TABLET ORAL EVERY 4 HOURS PRN
Qty: 12 TABLET | Refills: 0 | Status: SHIPPED | OUTPATIENT
Start: 2020-04-11 | End: 2020-04-17

## 2020-04-11 RX ORDER — MORPHINE SULFATE 2 MG/ML
1-2 INJECTION, SOLUTION INTRAMUSCULAR; INTRAVENOUS EVERY 4 HOURS PRN
Status: DISCONTINUED | OUTPATIENT
Start: 2020-04-11 | End: 2020-04-12 | Stop reason: HOSPADM

## 2020-04-11 RX ORDER — OXYCODONE HYDROCHLORIDE 5 MG/1
5-10 TABLET ORAL EVERY 4 HOURS PRN
Status: DISCONTINUED | OUTPATIENT
Start: 2020-04-11 | End: 2020-04-12 | Stop reason: HOSPADM

## 2020-04-11 RX ADMIN — SODIUM CHLORIDE, PRESERVATIVE FREE 5 ML: 5 INJECTION INTRAVENOUS at 08:48

## 2020-04-11 RX ADMIN — SERTRALINE HYDROCHLORIDE 150 MG: 100 TABLET ORAL at 09:41

## 2020-04-11 RX ADMIN — Medication 5 ML: at 22:21

## 2020-04-11 RX ADMIN — OXYCODONE HYDROCHLORIDE 10 MG: 5 TABLET ORAL at 11:57

## 2020-04-11 RX ADMIN — MORPHINE SULFATE 2 MG: 2 INJECTION, SOLUTION INTRAMUSCULAR; INTRAVENOUS at 01:41

## 2020-04-11 RX ADMIN — OXYCODONE HYDROCHLORIDE 10 MG: 10 TABLET, FILM COATED, EXTENDED RELEASE ORAL at 11:57

## 2020-04-11 RX ADMIN — DEFERASIROX 900 MG: 360 TABLET, FILM COATED ORAL at 09:45

## 2020-04-11 RX ADMIN — SODIUM CHLORIDE, PRESERVATIVE FREE 5 ML: 5 INJECTION INTRAVENOUS at 02:52

## 2020-04-11 RX ADMIN — HYDROXYUREA 2000 MG: 500 CAPSULE ORAL at 09:42

## 2020-04-11 RX ADMIN — MORPHINE SULFATE 2 MG: 2 INJECTION, SOLUTION INTRAMUSCULAR; INTRAVENOUS at 08:44

## 2020-04-11 RX ADMIN — OXYCODONE HYDROCHLORIDE 10 MG: 10 TABLET, FILM COATED, EXTENDED RELEASE ORAL at 22:21

## 2020-04-11 RX ADMIN — OMEPRAZOLE 40 MG: 20 CAPSULE, DELAYED RELEASE ORAL at 09:41

## 2020-04-11 RX ADMIN — ASPIRIN 81 MG CHEWABLE TABLET 81 MG: 81 TABLET CHEWABLE at 09:42

## 2020-04-11 RX ADMIN — GABAPENTIN 900 MG: 300 CAPSULE ORAL at 09:40

## 2020-04-11 RX ADMIN — OXYCODONE HYDROCHLORIDE 5 MG: 5 TABLET ORAL at 21:16

## 2020-04-11 ASSESSMENT — PAIN DESCRIPTION - DESCRIPTORS
DESCRIPTORS: CONSTANT

## 2020-04-11 ASSESSMENT — ACTIVITIES OF DAILY LIVING (ADL)
ADLS_ACUITY_SCORE: 12
ADLS_ACUITY_SCORE: 13
ADLS_ACUITY_SCORE: 12
ADLS_ACUITY_SCORE: 13

## 2020-04-11 NOTE — PLAN OF CARE
VSS on room air. Pt wanting to sleep in this morning, refused AM meds and vitals until late morning. Declined breakfast. Reg diet, calorie count. Boost supplements provided. Pain tolerable with scheduled Oxycontin and PRN Oxycodone/IV Morphine. Per team, try to encourage oral pain meds and avoid IV since patient will likely discharge tomorrow (patient aware). Voiding good amounts. Reports having a BM yesterday. Port hep locked. Continue with POC.

## 2020-04-11 NOTE — PLAN OF CARE
VSS. Pt reports neck & back pain, controlled with scheduled Oxycontin, IV Morphine x2, & pillow repositioning. Port hep locked. Reg diet (erendira count), fair intake, pt denies nausea. BS hypoactive, pt reports passing gas. Pt up independently to commode, refuses other OOB activity, voiding adequately.

## 2020-04-11 NOTE — PROGRESS NOTES
[facility], [place]    Progress Note - Tkia Licona Service        Date of Admission:  4/3/2020    Assessment & Plan     20 year old female with history of chronic pain syndrome, sickle cell disease c/b CVA with residual R sided hemiparesis and cognitive delay with recent hospitalization 3/27-3/29 admitted for persistent sickle cell crisis     Changes  - Start OP oxy PRN - use this instead of IV a sable. Decrease frequency of IV Morphine   Dispo: Pending improved pain plan and PO meds. Likely to home 4/12      Acute on chronic Anemia   Sickle cell vasoocclusive crisis   HbSS  Recent hospitalization 3/27 - 3/29 for same but left prematurely. On admit retic elevated to 625, hgb and tbili stable. No sign of acute checst of infectious although does have persistent mild leukocytosis.   PAIN:   Home oxycontin  Home Oxycodone   Morphine 1-2mg Q4h PRN  - If requiring large amts of IV for PRN, will add PO Oxycodone 5-10mg Q6h PRN as awell.   - HOLD celecoxib   - RESTART deferasirox (4/7/20)  - Received exchange transfusion 4/7, Desferal 4/9/20      Mild persistent asthma   Well controlled on ICS/ LABA and rescue   - Budesoiide Formoterol 2 puff BID   - Albuterol inhaler PRN      Hx of CVA 2/2 sickle cell :ASA 81mg Qday   Depression: Suspect such. Irritable and seems withdrawn in affect. Can consider screening for such however pt very unwilling to discuss further with me this morning wishing to be left alone.      Diet: Regular Diet Adult  Snacks/Supplements Adult: Boost Plus; Between Meals  Calorie Counts    Fluids: none  Lines: piv  DVT Prophylaxis: Enoxaparin (Lovenox) SQ  Lopez Catheter: not present  Code Status: Full Code      Disposition Plan   Expected discharge: Tomorrow, recommended to prior living arrangement once adequate pain management/ tolerating PO medications.  Entered: Chapis Horta MD 04/11/2020, 1:38 PM       The patient's care was discussed with the Attending  Physician, Dr. Chiu, Bedside Nurse and Patient.    MD Tika Schultz 5 Service  [facility], [place]  Pager: [phone]  Please see sticky note for cross cover information  ______________________________________________________________________    Interval History   NAEO   Pt comfortable. Around 2pm states she doesn't want to discharge - feels like she still has some pain ad isn't quite ready to discharge.   States she's generally feeling OK. Has boost at bedside. Resting and facetiming with family second time visited.     Data reviewed today: I reviewed all medications, new labs and imaging results over the last 24 hours. I personally reviewed no images or EKG's today.    Physical Exam   Vital Signs: Temp: 96.4  F (35.8  C) Temp src: Oral BP: 105/49 Pulse: 86 Heart Rate: 95 Resp: 16 SpO2: 98 % O2 Device: None (Room air)    Weight: 155 lbs 6.79 oz  General Appearance: Well appearing, resting in bed   Respiratory: clear lung sounds BL on anterior examination   Cardiovascular: rrr, no mrg   GI: deferred   Skin: no LE edema

## 2020-04-11 NOTE — PLAN OF CARE
VSS on RA, up independently. Aox4, calls appropriately. C/o left leg discomfort and pain, relieved with bilateral leg elevation and PRN IV morphine x1. Port c/d/I. Voids spontaneously to bedside commode. Passing gas, denies nausea. Continue with POC.

## 2020-04-12 ENCOUNTER — PATIENT OUTREACH (OUTPATIENT)
Dept: CARE COORDINATION | Facility: CLINIC | Age: 20
End: 2020-04-12

## 2020-04-12 VITALS
WEIGHT: 155.42 LBS | OXYGEN SATURATION: 99 % | RESPIRATION RATE: 16 BRPM | HEART RATE: 93 BPM | HEIGHT: 64 IN | BODY MASS INDEX: 26.53 KG/M2 | TEMPERATURE: 98.5 F | DIASTOLIC BLOOD PRESSURE: 60 MMHG | SYSTOLIC BLOOD PRESSURE: 122 MMHG

## 2020-04-12 PROCEDURE — 25000132 ZZH RX MED GY IP 250 OP 250 PS 637: Performed by: STUDENT IN AN ORGANIZED HEALTH CARE EDUCATION/TRAINING PROGRAM

## 2020-04-12 PROCEDURE — 25000128 H RX IP 250 OP 636: Performed by: STUDENT IN AN ORGANIZED HEALTH CARE EDUCATION/TRAINING PROGRAM

## 2020-04-12 PROCEDURE — 99239 HOSP IP/OBS DSCHRG MGMT >30: CPT | Performed by: STUDENT IN AN ORGANIZED HEALTH CARE EDUCATION/TRAINING PROGRAM

## 2020-04-12 RX ADMIN — Medication 5 ML: at 11:33

## 2020-04-12 RX ADMIN — OXYCODONE HYDROCHLORIDE 5 MG: 5 TABLET ORAL at 00:39

## 2020-04-12 RX ADMIN — OXYCODONE HYDROCHLORIDE 10 MG: 5 TABLET ORAL at 02:56

## 2020-04-12 RX ADMIN — ONDANSETRON 4 MG: 4 TABLET, ORALLY DISINTEGRATING ORAL at 10:52

## 2020-04-12 RX ADMIN — ACETAMINOPHEN 975 MG: 325 TABLET, FILM COATED ORAL at 00:39

## 2020-04-12 ASSESSMENT — ACTIVITIES OF DAILY LIVING (ADL)
ADLS_ACUITY_SCORE: 12

## 2020-04-12 ASSESSMENT — PAIN DESCRIPTION - DESCRIPTORS: DESCRIPTORS: CONSTANT

## 2020-04-12 NOTE — DISCHARGE SUMMARY
"                                                                 Medicine Discharge Summary  Jennifer Cervantes MRN: [id number]  2000  Primary care provider: Daya Carter  ___________________________________          Date of Admission:  4/3/2020  Date of Discharge:  4/12/2020   Admitting Physician:  Gutierrez Mortensen MD  Discharge Physician:  Jitendra Chiu  Discharging Service:  [place] [id number]     Primary Provider: Daya Carter         Reason for Admission:   Sickle cell crisis           Discharge Diagnosis:   Sickle cell vaso-occlusive crisis  HbSS  Acute on chronic anemia         Procedures & Significant Findings:   Exchange transfusion 4/7/2020         Consultations:   Hematology          Hospital Course by Problem:    Acute on chronic Anemia   Sickle cell vasoocclusive crisis   HbSS  Recent hospitalization 3/27 - 3/29 for same but left prematurely. On admit retic elevated to 625, hgb and tbili stable. No sign of acute checst of infectious although does have persistent mild leukocytosis. Received exchange transfusion on 4/7/2020. Desferal on 4/9/2020. Hematology following. Restarted deferasirox on 4/7/2020. On discharge, pt is to follow the pain regimen below.   PAIN:   Home oxycontin  Home Oxycodone   Morphine 1-2mg Q4h PRN    Physical Exam on day of Discharge:  Blood pressure 122/60, pulse 93, temperature 98.5  F (36.9  C), temperature source Oral, resp. rate 16, height 1.626 m (5' 4.02\"), weight 70.5 kg (155 lb 6.8 oz), SpO2 99 %, not currently breastfeeding.  General Appearance:  Well appearing, resting in bed   Respiratory: clear lung sounds BL on anterior examination   Cardiovascular: rrr, no mrg   GI: deferred   Skin: no LE edema          Discharge Medications:     Current Discharge Medication List      CONTINUE these medications which have CHANGED    Details   oxyCODONE (ROXICODONE) 5 MG tablet Take 1-2 tablets (5-10 mg) by mouth every 4 hours as needed for moderate to " severe pain  Qty: 12 tablet, Refills: 0    Associated Diagnoses: Sickle cell crisis (H)         CONTINUE these medications which have NOT CHANGED    Details   acetaminophen (TYLENOL) 325 MG tablet Take 2 tablets (650 mg) by mouth every 6 hours as needed for mild pain      albuterol (PROVENTIL) (2.5 MG/3ML) 0.083% neb solution Take 1 vial (2.5 mg) by nebulization every 6 hours as needed for shortness of breath / dyspnea or wheezing  Qty: 12 mL, Refills: 4    Associated Diagnoses: 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)      aspirin (ASA) 81 MG tablet Take 1 tablet (81 mg) by mouth daily      Budesonide-Formoterol Fumarate (SYMBICORT IN) Inhale  into the lungs.      celecoxib (CELEBREX) 100 MG capsule Take 100 mg by mouth      diphenhydrAMINE (BENADRYL) 25 MG capsule Take 1-2 capsules (25-50 mg) by mouth nightly as needed for sleep  Qty: 60 capsule, Refills: 3    Associated Diagnoses: Insomnia, unspecified type      erythromycin ethylsuccinate (E.E.S.) 400 MG tablet Take 1 tablet (400 mg) by mouth 3 times daily Before meals      gabapentin (NEURONTIN) 300 MG capsule Take 3 capsules (900 mg) by mouth 3 times daily    Associated Diagnoses: Chronic pain syndrome      GNP SENNA-LAX 8.6 MG tablet Take 1 tablet by mouth 2 times daily as needed for constipation      hydroxyurea (HYDREA) 500 MG capsule CHEMO Take 4 capsules (2,000 mg) by mouth daily      JADENU 360 MG tablet Take 2 tablets (720 mg) by mouth daily With one 180mg tab for a total daily dose of 900mg      omeprazole (PRILOSEC) 40 MG DR capsule Take 1 capsule (40 mg) by mouth daily      ondansetron (ZOFRAN) 8 MG tablet       OXYCONTIN 10 MG 12 hr tablet Take 1 tablet (10 mg) by mouth every 12 hours  Qty: 60 tablet, Refills: 0    Associated Diagnoses: 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)      pregabalin (LYRICA) 75 MG capsule       sertraline (ZOLOFT) 100 MG tablet Take 1.5 tablets (150 mg) by mouth daily  Qty: 30 tablet, Refills: 0      EPINEPHrine (EPIPEN) 0.3 MG/0.3ML  injection          STOP taking these medications       cyproheptadine (PERIACTIN) 4 MG tablet Comments:   Reason for Stopping:         PROAIR  (90 Base) MCG/ACT inhaler Comments:   Reason for Stopping:                    Discharge Instructions and Follow-Up:     Discharge Procedure Orders   Reason for your hospital stay   Order Comments: You were admitted with a sickle cell pain crisis. You were treated with an exchange transfusion as well as chelation therapy. Your physician  was aware of your admission and involved in your care. You ultimately improved and were discharged with a small dose of pain medications.   It is imperative you call 's Clinic and discuss a refill as he will prescribe further medications for you.     Adult [facility] Follow-up and recommended labs and tests   Order Comments: 1) Hematology with  and Colleagues within 1 week.   - Labs will be ordered per Heme team.   - Please ensure Patient calls the clinic within 2-3 days after dicharge to discuss pain management plan as she will require refill of Oxycodone.   Appointments on [place] and/or [facility] (with [facility] or [facility] provider or service). Call [phone] if you haven't heard regarding these appointments within 7 days of discharge.     Activity   Order Comments: Your activity upon discharge: activity as tolerated     Order Specific Question Answer Comments   Is discharge order? Yes      Diet   Order Comments: Follow this diet upon discharge: Orders Placed This Encounter      Snacks/Supplements Adult: Boost Plus; Between Meals      Calorie Counts      Regular Diet Adult     Order Specific Question Answer Comments   Is discharge order? Yes              Discharge Disposition:   Home          Condition on Discharge:   Discharge condition: Stable   Code status on discharge: Full Code        Date of service: 4/12/2020    The patient was discussed with Dr. Chiu.  Christin Zaragoza   Internal Medicine,  PGY-2  [id number]      Physician Attestation   I, Jitendra Chiu, saw and evaluated this patient prior to discharge.  I discussed the patient with the resident/fellow and agree with plan of care as documented in the note.      I personally reviewed vital signs, medications and labs.    I personally spent 40 minutes on discharge activities.    Jitendra Chiu MD  Date of Service (when I saw the patient): 04/12/20

## 2020-04-12 NOTE — PROGRESS NOTES
Calorie Count  Intake recorded for: 4/11  Total Kcals: 1573 Total Protein: 74g  Kcals from Hospital Food: 1573  Protein: 74g  Kcals from Outside Food (average):0 Protein: 0g  # Meals Recorded: 100% 2 bañuelos and swiss burgers w/ ketchup, 1.25 order of french fries   # Supplements Recorded: 0

## 2020-04-12 NOTE — PLAN OF CARE
"/60 (BP Location: Left arm)   Pulse 93   Temp 98.5  F (36.9  C) (Oral)   Resp 16   Ht 1.626 m (5' 4.02\")   Wt 70.5 kg (155 lb 6.8 oz)   LMP  (LMP Unknown)   SpO2 99%   BMI 26.67 kg/m      VSS on room air. Regular diet, denies nausea. Pain noted in back and legs, oxycodone and tylenol for pain management up ad janett. Voiding, not saving. Patient reports BM 2 nights ago. Resting between cares. Plan to discharge home today.  "

## 2020-04-12 NOTE — PLAN OF CARE
Assumed care of pt from [id number]. Pt c/o lower left leg and back pain, managed with PRN oral oxycodone x1 and scheduled oxycontin. Port is heparin locked. On regular diet, ate 100% dinner, on calorie counts - pt denied nausea at this time. Voids spontaneously to bedside commode. LBM 4/11, passing gas per pt report. Refused evening gabapentin. Continue with POC.

## 2020-04-13 ENCOUNTER — VIRTUAL VISIT (OUTPATIENT)
Dept: SLEEP MEDICINE | Facility: CLINIC | Age: 20
End: 2020-04-13
Attending: PHYSICIAN ASSISTANT
Payer: COMMERCIAL

## 2020-04-13 VITALS — WEIGHT: 155 LBS | BODY MASS INDEX: 26.46 KG/M2 | HEIGHT: 64 IN

## 2020-04-13 DIAGNOSIS — D57.1 HB-SS DISEASE WITHOUT CRISIS (H): ICD-10-CM

## 2020-04-13 DIAGNOSIS — I63.9 CEREBRAL INFARCTION, UNSPECIFIED MECHANISM (H): ICD-10-CM

## 2020-04-13 DIAGNOSIS — R06.83 SNORING: Primary | ICD-10-CM

## 2020-04-13 DIAGNOSIS — F81.9 COGNITIVE DEVELOPMENTAL DELAY: ICD-10-CM

## 2020-04-13 DIAGNOSIS — R06.81 APNEA: ICD-10-CM

## 2020-04-13 DIAGNOSIS — J35.1 ENLARGED TONSILS: ICD-10-CM

## 2020-04-13 PROCEDURE — 99203 OFFICE O/P NEW LOW 30 MIN: CPT | Mod: TEL | Performed by: FAMILY MEDICINE

## 2020-04-13 ASSESSMENT — MIFFLIN-ST. JEOR: SCORE: 1453.08

## 2020-04-13 NOTE — PROGRESS NOTES
Patient has clinic visit within 24-48 hours of discharge so no post DC follow up call is needed.    Name: Jennifer Cervantes MRN: [id number]   Date: 4/13/2020 Status: [facility]   Time: 1:00 PM Length: 60   Visit Type: NEW REFERRED INSIDE [facility] [1768] YOLA:     Provider: Shady Schulz MD Department:  SLEEP CENTER     Naida Fonseca CMA   Post Hospital Discharge Team

## 2020-04-14 ENCOUNTER — INFUSION THERAPY VISIT (OUTPATIENT)
Dept: ONCOLOGY | Facility: CLINIC | Age: 20
End: 2020-04-14
Attending: PEDIATRICS
Payer: COMMERCIAL

## 2020-04-14 VITALS
HEART RATE: 84 BPM | RESPIRATION RATE: 16 BRPM | SYSTOLIC BLOOD PRESSURE: 121 MMHG | DIASTOLIC BLOOD PRESSURE: 86 MMHG | OXYGEN SATURATION: 98 % | TEMPERATURE: 98.2 F

## 2020-04-14 DIAGNOSIS — D57.00 SICKLE CELL PAIN CRISIS (H): Primary | ICD-10-CM

## 2020-04-14 PROCEDURE — 96361 HYDRATE IV INFUSION ADD-ON: CPT

## 2020-04-14 PROCEDURE — 25000128 H RX IP 250 OP 636: Mod: ZF | Performed by: PEDIATRICS

## 2020-04-14 PROCEDURE — 96376 TX/PRO/DX INJ SAME DRUG ADON: CPT

## 2020-04-14 PROCEDURE — 25000128 H RX IP 250 OP 636: Mod: ZF | Performed by: PHYSICIAN ASSISTANT

## 2020-04-14 PROCEDURE — 25800030 ZZH RX IP 258 OP 636: Mod: ZF | Performed by: PHYSICIAN ASSISTANT

## 2020-04-14 PROCEDURE — 96374 THER/PROPH/DIAG INJ IV PUSH: CPT

## 2020-04-14 RX ORDER — MORPHINE SULFATE 2 MG/ML
2 INJECTION, SOLUTION INTRAMUSCULAR; INTRAVENOUS
Status: CANCELLED
Start: 2020-04-14

## 2020-04-14 RX ORDER — MORPHINE SULFATE 2 MG/ML
2 INJECTION, SOLUTION INTRAMUSCULAR; INTRAVENOUS
Status: DISCONTINUED | OUTPATIENT
Start: 2020-04-14 | End: 2020-04-14 | Stop reason: HOSPADM

## 2020-04-14 RX ORDER — HEPARIN SODIUM,PORCINE 10 UNIT/ML
5 VIAL (ML) INTRAVENOUS
Status: CANCELLED | OUTPATIENT
Start: 2020-04-14

## 2020-04-14 RX ORDER — DIPHENHYDRAMINE HCL 25 MG
25 CAPSULE ORAL
Status: CANCELLED
Start: 2020-04-14

## 2020-04-14 RX ORDER — HEPARIN SODIUM (PORCINE) LOCK FLUSH IV SOLN 100 UNIT/ML 100 UNIT/ML
5 SOLUTION INTRAVENOUS
Status: CANCELLED | OUTPATIENT
Start: 2020-04-14

## 2020-04-14 RX ORDER — ONDANSETRON 8 MG/1
8 TABLET, ORALLY DISINTEGRATING ORAL
Status: DISCONTINUED | OUTPATIENT
Start: 2020-04-14 | End: 2020-04-14 | Stop reason: HOSPADM

## 2020-04-14 RX ORDER — DIPHENHYDRAMINE HCL 25 MG
25 CAPSULE ORAL
Status: DISCONTINUED | OUTPATIENT
Start: 2020-04-14 | End: 2020-04-14 | Stop reason: HOSPADM

## 2020-04-14 RX ORDER — HEPARIN SODIUM (PORCINE) LOCK FLUSH IV SOLN 100 UNIT/ML 100 UNIT/ML
5 SOLUTION INTRAVENOUS
Status: DISCONTINUED | OUTPATIENT
Start: 2020-04-14 | End: 2020-04-14 | Stop reason: HOSPADM

## 2020-04-14 RX ORDER — ONDANSETRON 8 MG/1
8 TABLET, FILM COATED ORAL
Status: CANCELLED
Start: 2020-04-14

## 2020-04-14 RX ADMIN — MORPHINE SULFATE 2 MG: 2 INJECTION, SOLUTION INTRAMUSCULAR; INTRAVENOUS at 14:59

## 2020-04-14 RX ADMIN — MORPHINE SULFATE 2 MG: 2 INJECTION, SOLUTION INTRAMUSCULAR; INTRAVENOUS at 12:47

## 2020-04-14 RX ADMIN — DEXTROSE AND SODIUM CHLORIDE 500 ML: 5; 450 INJECTION, SOLUTION INTRAVENOUS at 12:47

## 2020-04-14 RX ADMIN — Medication 5 ML: at 15:20

## 2020-04-14 RX ADMIN — MORPHINE SULFATE 2 MG: 2 INJECTION, SOLUTION INTRAMUSCULAR; INTRAVENOUS at 13:54

## 2020-04-14 ASSESSMENT — PAIN SCALES - GENERAL
PAINLEVEL: SEVERE PAIN (7)
PAINLEVEL: EXTREME PAIN (8)
PAINLEVEL: SEVERE PAIN (7)
PAINLEVEL: EXTREME PAIN (8)

## 2020-04-14 NOTE — PROGRESS NOTES
Infusion Nursing Note:  Jennfier Cervantes presents today for IV fluids/pain meds.    Patient seen by provider today: No   present during visit today: Not Applicable.    Note: Patient arrived rating her pain today 8/10. Receiving morphine IV for pain. After 3 doses of morphine, patient still rating her pain 7/10, but feels like she would be ok with discharging home. She states she has pain medication at home to use.     Intravenous Access:  Implanted Port.    Treatment Conditions:  Not Applicable.  No labs today.     Post Infusion Assessment:  Patient tolerated infusion without incident. Declines need for zofran or benadryl today.   Blood return noted pre and post infusion.  Access discontinued per protocol.     Discharge Plan:   Patient declined prescription refills.  Discharge instructions reviewed with: Patient.  AVS to patient via TuneIn.  Patient will return 4/17 for next provider appointment.   Patient discharged in stable condition accompanied by: self.  Departure Mode: Ambulatory.    Henny Suresh RN

## 2020-04-16 ENCOUNTER — TELEPHONE (OUTPATIENT)
Dept: ONCOLOGY | Facility: CLINIC | Age: 20
End: 2020-04-16

## 2020-04-16 NOTE — PROGRESS NOTES
"Jennifer Cervantes is a 20 year old female who is being evaluated via a billable telephone visit.      The patient has been notified of following:     \"This telephone visit will be conducted via a call between you and your physician/provider. We have found that certain health care needs can be provided without the need for a physical exam.  This service lets us provide the care you need with a short phone conversation.  If a prescription is necessary we can send it directly to your pharmacy.  If lab work is needed we can place an order for that and you can then stop by our lab to have the test done at a later time.    Telephone visits are billed at different rates depending on your insurance coverage. During this emergency period, for some insurers they may be billed the same as an in-person visit.  Please reach out to your insurance provider with any questions.    If during the course of the call the physician/provider feels a telephone visit is not appropriate, you will not be charged for this service.\"    Patient has given verbal consent for Telephone visit?  Yes    How would you like to obtain your AVS? Mail a copy    Additional provider notes:     Referral for difficulty falling asleep, concerns with breathing during sleep.    19 yo F with PMHx of sickle cell syndrome with frequent crises, left side stroke with right sided hemiparesis at age 1, chronic pain, cognitive impairment from stroke.    S/P tonsillectomy on 10/2/2019.    Left VM at 1300, then did get a hold of patient at 1326.    She feels her sleep concerns are having a difficult time falling asleep and also snoring / stopping breathing in her sleep.    She reports that she falls asleep sometime between 9pm and 3am and really could not give me more details.  Seems to deny frequent or prolonged awakenings.  Will awaken as late as 1pm naturally.  Seems to deny daytime napping otherwise.    She has been told she snores regularly, makes strange breathing noises " "in sleep, stopping breathing and \"breathing funny\".      Denies abnormal nocturnal behaviors.    Reports significant daytime fatigue.    Current home opiate regiment is oxycodone 5mg x1-2 QID PRN, oxycontin 10mg Q12 hours.  Also gabapentin 900mg TID.    No known family history of AMAN.    SHx:  Lives with parents, does not appear to be in school or working.    Echocardiogram on 5/13/2019 with report of being WNL, including normal estimated peak RV systolic pressure.    A/P:  1.)  Risk factors for obstructive sleep apnea (including centrally mediated with history of stroke and right hemiparesis) and opiate-related central sleep apnea.   - Plan for in-lab PSG once possible post-COVID.   - Given normal echocardiogram within past year, would appear to be candidate for ASV if significant central or complex sleep apnea seen.    Phone call duration: 30 minutes    Shady Schulz MD, MD      "

## 2020-04-16 NOTE — TELEPHONE ENCOUNTER
Pt called in to triage requesting refill of Oxycodone IR 10 mg, state she was taking 20 mg every 6 hours, along with Oxycontin 10 mg every 12 hours. State she was told to increase dose by Andrei HIGGINS before she was hospitalized. Per chart review, discharge hospitalist wrote for #12 Oxycodone 5 mg but she denied ever picking up this script. Requesting refill of 2 week (#56 tablets) to [facility] on [address], state she is entirely out. Denied acute pain at this time, has clinic visit with Andrei Friday 4/17. Routed to Dr. Duncan to review dosing as history shows only 10 mg every 4-6 hours as needed.

## 2020-04-16 NOTE — TELEPHONE ENCOUNTER
Per Dr. Duncan: pt was not told to increase dose, she was given a 2 week supply on 3/27, with admission of 9 days, did she go through 56 tablets in the week before admission and last 2 days?    Called pt back, reached  and lm for her to call triage.

## 2020-04-16 NOTE — TELEPHONE ENCOUNTER
"Pt called back and again stated she thought Andrei told her to take 2 tabs (20 mg) every 6 hours so she was doing that since discharge and that's why she ran out today. She stated understanding that Dr. Duncan would like her to stay on 10 mg every 6 hours and thinks she can go back to that. Asked her if her pain was manageable on her oxycontin that she can wait until tomorrow's visit with Andrei to discuss getting a 2 week fill or does she want Dr. Duncan to give her a few tabs today? She stated \"no that's fine\" and hung up.  "

## 2020-04-17 ENCOUNTER — PATIENT OUTREACH (OUTPATIENT)
Dept: ONCOLOGY | Facility: CLINIC | Age: 20
End: 2020-04-17

## 2020-04-17 DIAGNOSIS — D57.00 SICKLE CELL CRISIS (H): ICD-10-CM

## 2020-04-17 RX ORDER — OXYCODONE HYDROCHLORIDE 5 MG/1
5-10 TABLET ORAL EVERY 4 HOURS PRN
Qty: 56 TABLET | Refills: 0 | Status: SHIPPED | OUTPATIENT
Start: 2020-04-17 | End: 2020-04-27

## 2020-04-17 NOTE — PROGRESS NOTES
Writer placed call to patient to discuss missed appointment today and check status of medications. No answer received, writer left detailed VM but was cut off at end. Writer sent message to Andrei and Dr Duncan for informational purposes.

## 2020-04-20 ENCOUNTER — PATIENT OUTREACH (OUTPATIENT)
Dept: ONCOLOGY | Facility: CLINIC | Age: 20
End: 2020-04-20

## 2020-04-20 NOTE — PROGRESS NOTES
Writer placed call to patient to assess current status and inquire about missed appointment on Friday. Patient stated that she called to report issues with obtaining transportation but did not hear back from anyone. No VM on callers phone extension and patient could not remember the name of the person she was transferred to. Writer advised of contact methods with [facility] being most efficient at this time with many staff members [facility]. Writer also advised that refill of oxycodone had been called in to local pharmacy, patient was unaware and states that the pharmacy had not contacted her to advise of availability. Patient confused on schedule of appointments for this week, writer advised that her appointment with Dr Duncan is on Friday, not on Wednesday as she states she was told by a female that called her but was unable to remember a name. Writer updated provider team.

## 2020-04-24 ENCOUNTER — VIRTUAL VISIT (OUTPATIENT)
Dept: ONCOLOGY | Facility: CLINIC | Age: 20
End: 2020-04-24
Attending: PEDIATRICS
Payer: COMMERCIAL

## 2020-04-24 DIAGNOSIS — E83.111 IRON OVERLOAD DUE TO REPEATED RED BLOOD CELL TRANSFUSIONS: ICD-10-CM

## 2020-04-24 DIAGNOSIS — D57.1 HB-SS DISEASE WITHOUT CRISIS (H): Primary | ICD-10-CM

## 2020-04-24 PROCEDURE — 40000114 ZZH STATISTIC NO CHARGE CLINIC VISIT

## 2020-04-24 RX ORDER — DEFERASIROX 360 MG/1
1440 TABLET, FILM COATED ORAL DAILY
Qty: 120 TABLET | Refills: 4 | Status: SHIPPED | OUTPATIENT
Start: 2020-04-24 | End: 2020-11-06

## 2020-04-24 RX ORDER — DEFERASIROX 360 MG/1
1440 TABLET, FILM COATED ORAL DAILY
Qty: 120 TABLET | Refills: 4 | Status: SHIPPED | OUTPATIENT
Start: 2020-04-24 | End: 2020-04-24

## 2020-04-24 NOTE — PATIENT INSTRUCTIONS
I am glad you are doing well Jennifer. We will stick with the transfusions every 6 weeks or so but increase the Jadenu to 4 of the 360 mg tablets. I still would like to stop transfusions at some point but we can wait for now. You can try increasing the gabapentin to 4 tabs in the morning if you continue to have the numbness and tingling under your chin and can't avoid touching the area, though you may try some ointment like IcyHot as well.

## 2020-04-24 NOTE — PROGRESS NOTES
"  Sickle Cell Clinic Follow Up Outpatient Visit    Date of encounter: 4/24/2020      Jennifer Cervantes is a 20 year old female who is being evaluated via a billable video visit.      The patient has been notified of following:     \"This video visit will be conducted via a call between you and your physician/provider. We have found that certain health care needs can be provided without the need for an in-person physical exam.  This service lets us provide the care you need with a video conversation.  If a prescription is necessary we can send it directly to your pharmacy.  If lab work is needed we can place an order for that and you can then stop by our lab to have the test done at a later time.    Video visits are billed at different rates depending on your insurance coverage.  Please reach out to your insurance provider with any questions.    If during the course of the call the physician/provider feels a video visit is not appropriate, you will not be charged for this service.\"    Patient has given verbal consent for Video visit? Yes     How would you like to obtain your AVS? E-Mail (inform patient AVS not encrypted)    Patient would like the video invitation sent by: Text to cell phone: [phone]    Will anyone else be joining your video visit? No        Video-Visit Details    Type of service:  Video Visit    Video Start Time: 1:01 PM  Video End Time: 1:29 PM    Originating Location (pt. Location): Home    Distant Location (provider location):  [facility]     Mode of Communication:  Video Conference via [employer]      Eric Duncan MD          ------------------------------------------------  Visit Notes    Jennifer Cervantes is a 20 year old female who is has a follow up outpatient hematology visit for Hb SS.    Jennifer's visit was done via video due to COVID19 restrictions.    Interval History  Jennifer is feeling pretty well today. She was admitted for a pain crisis 3/27-3/29 for pain " "(though she felt well reportedly at our phone call that day), then from 4/3-4/12/20, and she also needed to come in once for IV morphine (4/14). She got three doses of morphine with mild relief but chose to go home rather than be admitted. She has not had any need for IV opioids in the last 10 days. During her admission, she underwent a simple transfusion and then exchange transfusion for a pain crisis. She also received a dose of Desferal on 4/9. She has been back on her home oxycodone and oxycontin regimen along with the same lower dose of Jadenu. No cough or cold symptoms. No fever. She is maintaining social distancing restrictions as much as possible.    Her complaints today are primarily numbness and tingling on her neck when she touches it. This can be any time though she does not have pain without touching it. Her indwelling line site from the exchange transfusion is the problem location. Ice and heat packs have helped slightly but temporarily. She is frustrated by this sensation.    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 She also has significant anxiety and depression with a strong anxiety about transferring to the adult clinic. She usually has pain crises secondary to the anxiety. This \"fear of the unknown\" is significant enough that she wants to tour the inpatient floor before the transition or before she gets admitted there. [facility] with recurrent pain crises and is actually out on pass now but is still admitted as of Friday 2/28. She has no real support from family at home and that, combined with her cognitive delays, make her " care even more complex. She is having some back pain today which is a frequent location for her. She does say that her oral options do take an edge off. No fevers or cough, chest pain, dyspnea, bruising, or GI symptoms today. She has gotten a couple doses of Desferal for iron overload as she has been on chronic transfusions post-stroke for a long time. She is up to date on immunizations and got to meet with Daya Carter last week (also while on pass). It is unclear when she will discharge from [facility] but based on recent history, she thinks she will have a pain crisis soon after discharge.    Key results prior to referral:  MR ABDOMEN W/O CONTRAST ([facility]), 10/3/2017  (unlikely to be most recent one)     Comparison: 12/28/2015   Clinical history: Sickle cell disease   Findings:     Average liver iron concentration:  28.6 mg/g dry tissue, previously 22.8 mg/g dry tissue (NR: 0.17-1.8)  513 mmol/kg dry tissue, previously 400.8 mmol/kg dry tissue (NR: 3-33)     There is also iron deposition in the spleen, which is enlarged.  Prominent vessels in the left upper quadrant, which may be related to varices, are unchanged from the comparison examinations.                                                            Impression:  1. Increase in elevated liver concentration.  2. Splenomegaly. Question portal hypertension.     WAYNE CURTIS MD    11/4/19  MRI Pelvis  Summary:  Marrow changes consistent with SCD but no convincing evidence of AVN at this time.     1/28/20 MRI/MRA  Comparison 5/8/15  Summary:  Stable appearance of the brain and cerebral vasculature compared to 5/8/2015, including remote left MCA teritory infarct. No evidence of new infarct or new abnormality on MRA    3/10/2020 Cardiac MRI      Review of systems:  A complete 14 point review of systems was completed. All were negative except for what was reported in the HPI or highlighted here.    --------------------------------  Plan last reviewed with  patient: 4/23/2020    Patient background: 19yo F, enjoys movies and kids though there are times where she does not really want to talk to people. Does not have a lot of social support at home.     Sickle Cell Disease History  Primary Hematologist: Perla  Genotype: SS  Acute Pain Crisis Treatment:    ER/Acute Care/Infusion Clinic:   o Morphine 2 mg IVP/SC Q1H X 3 doses  o Toradol  o Maintenance IV fluids with D5 half-normal saline  o Other: Benadryl PO and Zofran 8 mg IV PRN itching or nausea    Inpatient:  o Opioid: Morphine 2 mg IV Q1H PRN until PCA starts  o PCA plan: (Consistent with [facility] pain plan)  - PCA button dose: Morphine 1 mg  - Lockout: 10 minutes  - Continuous Infusion: consider 1 mg/hr  - One hr max: 6 mg  o [facility] often had success weaning to OxyCONTIN 10 mg q12h with Morphine 0.7 mg IV q10 min PRN rather than continuous infusion  o Other Medications: Benadryl, Zofran  o If PCA not working, she Has had success with ketamine starting at 4mg/h and advancing only to 6mg/h, as 8mg/h made her feel quite poorly.  o If giving scheduled toradol, hold ASA (ok to give celebrex if she prefers)  o Supportive Care: Docusate, Senna  Chronic Pain Medications:    Home regimen as of 2/4/20 at [facility]: OxyCONTIN 10 mg po q 12 hrs and oxycodone 5-10 mg p.o. q.4-6 hours p.r.n. breakthrough pain.  She also continues on Celebrex, and Zoloft among other medications.    -Also benefits from mental health visits, acupuncture  Baseline Hemoglobin: 9.5 g/dL (on chronic transfusions)  Hydroxyurea use: Yes  H/O blood transfusions: Yes, several (iron overload)    H/O Transfusion Reactions: no    Antibodies:none  H/O Acute Chest Syndrome: Yes    Last episode: 10/2019     ICU/intubation: unsure  H/O Stroke: Yes (managed with chronic transfusions (has left her with neurodevelopmental deficits, low IQ)  H/O VTE: no  H/O Cholecystectomy or Splenectomy: no  H/O Asthma, Pulm HTN, AVN, Leg Ulcers, Nephropathy,  Retinopathy, etc: Iron overload, asthma, chronic lung disease    EMERGENCY CARE PLAN  DO NOT TRANSFUSE WITHOUT DISCUSSING WITH HEMATOLOGY ATTENDING (available 24/7).       TRANSFUSION IS RISKY DUE TO RISK OF ALLOIMMUNIZATION AGAINST RBC ANTIGENS AND IRON OVERLOAD.  INDICATIONS FOR TRANSFUSION ARE ACUTE STROKE AND ACUTE CHEST SYNDROME (hypoxia plus infiltrate, not chest pain).  DO NOT TRANSFUSE FOR ANEMIA      -------------------------------------------    Sickle Cell Disease Comprehensive Checklist    Bone Health/Avascular Necrosis Screening/Symptoms (each visit): none today    Leg Ulcer evaluation (every visit): none    Hypertension (every visit): none    Last ophthalmologic exam: within last year (timing unsure)    Last urinalysis for microalbuminuria/CKD (annually): within last year    Last pulmonary evaluation (asthma, AMAN, pulm HTN): within last year    Stroke/silent cerebral infarct Hx (Y/N): Yes    Last PCP Visit: 2/2020    Vaccines:  o PCV13: 5/13/19  o Pneumovax (PPSV23): 3/04, 10/09, 7/12/19 (next due 7/2024)  o Menactra: 4/2010, 9/2015 (MCV due Sept 2020)  o Trumemba:  o Influenza: got 19-20 vaccine    Audiology (chelation): need to track down last appointment    Past Medical History:  Past Medical History:   Diagnosis Date     Anemia      Anxiety      Bleeding disorder (H)      Blood clotting disorder (H)      Cerebral infarction (H) 2015     Cognitive developmental delay     low IQ. 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 (H)     right hand contractures     Iron overload due to repeated red blood cell transfusions      Migraines      Oppositional defiant behavior      Uncomplicated asthma        Past Surgical History:  Past Surgical History:   Procedure Laterality Date     AS INSERT TUNNELED CV 2 CATH W/O PORT/PUMP       C BREAST REDUCTION (INCLUDES LIPO) TIER 3 Bilateral  04/23/2019     IR CVC NON TUNNEL PLACEMENT  4/7/2020     REPAIR TENDON ELBOW Right 10/2/2019    Procedure: Right Elbow Flexor Lengthening, Flexor Pronator Slide Of Wrist and Finger, Thumb Adductor Lengthening;  Surgeon: Anai Franco MD;  Location: [facility] OR     TONSILLECTOMY Bilateral 10/2/2019    Procedure: Bilateral Tonsillectomy;  Surgeon: Farhana Guy MD;  Location: [facility] OR       Family History:   Family History   Problem Relation Age of Onset     Sickle Cell Trait Mother      Sickle Cell Trait Father        Social History:  Social History     Socioeconomic History     Marital status: Single     Spouse name: Not on file     Number of children: Not on file     Years of education: Not on file     Highest education level: Not on file   Occupational History     Not on file   Social Needs     Financial resource strain: Not on file     Food insecurity     Worry: Not on file     Inability: Not on file     Transportation needs     Medical: Not on file     Non-medical: Not on file   Tobacco Use     Smoking status: Never Smoker     Smokeless tobacco: Never Used   Substance and Sexual Activity     Alcohol use: Not Currently     Alcohol/week: 0.0 standard drinks     Drug use: Never     Sexual activity: Not Currently     Partners: Male     Birth control/protection: Other   Lifestyle     Physical activity     Days per week: Not on file     Minutes per session: Not on file     Stress: Not on file   Relationships     Social connections     Talks on phone: Not on file     Gets together: Not on file     Attends Denominational service: Not on file     Active member of club or organization: Not on file     Attends meetings of clubs or organizations: Not on file     Relationship status: Not on file     Intimate partner violence     Fear of current or ex partner: Not on file     Emotionally abused: Not on file     Physically abused: Not on file     Forced sexual activity: Not on file   Other Topics Concern      "Parent/sibling w/ CABG, MI or angioplasty before 65F 55M? Not Asked   Social History Narrative    Lives with mom and extended family but \"toxic environment\" per her report. She would like to move out but cannot financially do so. She has minimal support at home despite her significant SCD comorbidities and cognitive delay from stroke.       Medications:  Current Outpatient Medications   Medication     acetaminophen (TYLENOL) 325 MG tablet     albuterol (PROVENTIL) (2.5 MG/3ML) 0.083% neb solution     aspirin (ASA) 81 MG tablet     Budesonide-Formoterol Fumarate (SYMBICORT IN)     celecoxib (CELEBREX) 100 MG capsule     diphenhydrAMINE (BENADRYL) 25 MG capsule     EPINEPHrine (EPIPEN) 0.3 MG/0.3ML injection     erythromycin ethylsuccinate (E.E.S.) 400 MG tablet     gabapentin (NEURONTIN) 300 MG capsule     GNP SENNA-LAX 8.6 MG tablet     hydroxyurea (HYDREA) 500 MG capsule CHEMO     JADENU 360 MG tablet     omeprazole (PRILOSEC) 40 MG DR capsule     ondansetron (ZOFRAN) 8 MG tablet     oxyCODONE (ROXICODONE) 5 MG tablet     OXYCONTIN 10 MG 12 hr tablet     pregabalin (LYRICA) 75 MG capsule     sertraline (ZOLOFT) 100 MG tablet     No current facility-administered medications for this visit.          Physical Exam:   No vitals performed as this is a video visit.     GENERAL: healthy, alert and no distress  EYES: Eyes grossly normal to inspection, conjunctivae and sclerae normal  HENT: normocephalic/atraumatic.  External ears, nose and mouth without ulcers or lesions.  NECK/CHEST: incision site from indwelling line and separate port site are clean and healthy appearing. No erythema or drainage noted.  RESP: no audible wheeze, cough, or visible cyanosis.  No visible retractions or increased work of breathing.  Able to speak fully in complete sentences.  NEURO: Cranial nerves grossly intact, mentation intact and speech normal  PSYCH: mentation appears normal, affect normal/bright, judgement and insight intact, normal " speech and appearance well-groomed  SKIN: Normal color for ethnicity. No notable pallor or icterus.    Imaging: none today    Assessment:  Jennifer Cervantes is a 20 year old female with HbSS. Her disease has been complicated by stroke leading to significant cognitive delay and right UE hemiparesis, high anxiety leading to frequent pain crises on top of chronic pain syndrome, poor social structure at home with no real support, and iron overload secondary to repeated transfusions. She seems to be doing quite well today based on the video visit, with some mild facial/neck pain residual from the exchange indwelling line. We focused on iron overload management, particularly finding the balance of the poor palatability of the Jadenu but importance of increasing the dose combined with need (or lack thereof) of chronic transfusions with the associated iron overload that outpaces the Jadenu. We also updated her pain plan.    Plan:  1) HbSS and complications   -- Pain plan reviewed and updated to 2 mg dosing of morphine              -- She has done pretty well with her pain and has done relatively well from the pain standpoint, though she had one long admission this month.   --She has been on q6 week transfusions (exchanged 4/7). Had CVA ~2 yo and another TIA ~2014 during a trial off transfusions and just HU. We may need to consider this again given her significant iron overload but she will take time to become comfortable with this. Next Transfusion (+/- phlebotomy depending on what our nurses decide on access) TBD.   --Needs aggressive chelation. Will increase her Jadenu to 1440 mg (~20/kg) Also need to have audiology appt when COVID restrictions zaid.   --Consider neuropsych testing given her delay history   --Continue ASA post-stroke  2) Labs: none today. CBC, CMP, ferritin at next visit  3) Medication Changes: Oxycodone and Oxycontin to be refilled in ~a week or so when she runs out. Plan is to increase the oxycodone to  similar dosing but only using 10 mg tabs per request (she finds 5 mg tabs are of little help). Oxycontin will be refilled at the appropriate time to 60 tabs x 1 month (10 mg BID).   4)  Other orders/recommendations: She could try to increase the gabapentin in the AM for her facial tingling or try IcyHot topically, though we described that it will probably just need time to resolve.  5)  HCM: PCP is Daya Carter. Need to get SW closely involved. Appt with her today  6)  Psych: She remains on the 150 mg Zoloft as recommended last week. Agree with titration plan as described.  7)  Follow up plan: Due to COVID-19 concerns, we will likely need to plan for limiting to every visits only for transfusions (q6 weeks for now, next one mid May). Phone visits weekly alternating with Andrei Machado or myself. She is at high risk for LTFU and complications from a horrendous social situation and developmental delay so her access to care is essential to be in person. May benefit from crizanlizumab  8) Next visit: 2 weeks with virtual visit    It was a pleasure talking to Jennifer today and in continuing to guide her care and transition to adult care moving forward.    I spent a total of 28 minutes on the video with Jennifer during today's office visit. See note for details.    Video contact  Phone call start: 1:01 PM  Phone call end: 1:29 PM      Eric Duncan MD  Hematologist  Division of Hematology, Oncology, and Transplantation  [facility]  [facility]  Pager: [phone]

## 2020-04-25 ENCOUNTER — NURSE TRIAGE (OUTPATIENT)
Dept: NURSING | Facility: CLINIC | Age: 20
End: 2020-04-25

## 2020-04-25 NOTE — TELEPHONE ENCOUNTER
Patient is asking for a refill of Oxycontin 10 mg extended release.  Patient says current back pain level 7.5/10 and is trying to keep pain under control.  Patient reports she did take the fast acting oxycodone 10 mg.  Patient says she only has 2 tablets left.  Per Supriya she last picked up the Oxycontin on 3/27/20 and is in the window to  new refill of the medication.  Patient says she has plenty left of the oxycodone tablets and according to [facility] she picked up both 5 mg and 10 mg strengths recently.  Paged on-call provider, Dr. Mei, at 10:55 am via page  to call FNA back re: refill request of Oxycontin 10mg.  FNA spoke to Dr. Mei who recommends patient request refill from Dr. Duncan on Monday; she will not refill it this weekend.  FNA informed patient who verbalized understanding.      Reason for Disposition    Caller requesting a NON-URGENT new prescription or refill and triager unable to refill per unit policy    Additional Information    Negative: Drug overdose and nurse unable to answer question    Negative: Caller requesting information not related to medicine    Negative: Caller requesting a prescription for Strep throat and has a positive culture result    Negative: Rash while taking a medication or within 3 days of stopping it    Negative: Immunization reaction suspected    Negative: [1] Asthma and [2] having symptoms of asthma (cough, wheezing, etc)    Negative: MORE THAN A DOUBLE DOSE of a prescription or over-the-counter (OTC) drug    Negative: [1] DOUBLE DOSE (an extra dose or lesser amount) of over-the-counter (OTC) drug AND [2] any symptoms (e.g., dizziness, nausea, pain, sleepiness)    Negative: [1] DOUBLE DOSE (an extra dose or lesser amount) of prescription drug AND [2] any symptoms (e.g., dizziness, nausea, pain, sleepiness)    Negative: Took another person's prescription drug    Negative: [1] DOUBLE DOSE (an extra dose or lesser amount) of prescription drug AND  "[2] NO symptoms (Exception: a double dose of antibiotics)    Negative: Diabetes drug error or overdose (e.g., insulin or extra dose)    Negative: [1] Request for URGENT new prescription or refill of \"essential\" medication (i.e., likelihood of harm to patient if not taken) AND [2] triager unable to fill per unit policy    Negative: [1] Prescription not at pharmacy AND [2] was prescribed today by PCP    Negative: Pharmacy calling with prescription questions and triager unable to answer question    Negative: Caller has URGENT medication question about med that PCP prescribed and triager unable to answer question    Negative: Caller has NON-URGENT medication question about med that PCP prescribed and triager unable to answer question    Protocols used: MEDICATION QUESTION CALL-A-AH      "

## 2020-04-27 ENCOUNTER — TELEPHONE (OUTPATIENT)
Dept: ONCOLOGY | Facility: CLINIC | Age: 20
End: 2020-04-27

## 2020-04-27 ENCOUNTER — PATIENT OUTREACH (OUTPATIENT)
Dept: ONCOLOGY | Facility: CLINIC | Age: 20
End: 2020-04-27

## 2020-04-27 DIAGNOSIS — D57.1 HB-SS DISEASE WITHOUT CRISIS (H): ICD-10-CM

## 2020-04-27 DIAGNOSIS — D57.00 SICKLE CELL CRISIS (H): ICD-10-CM

## 2020-04-27 DIAGNOSIS — D57.1 HB-SS DISEASE WITHOUT CRISIS (H): Primary | ICD-10-CM

## 2020-04-27 RX ORDER — OXYCODONE HYDROCHLORIDE 10 MG/1
10 TABLET ORAL EVERY 6 HOURS PRN
Qty: 30 TABLET | Refills: 0 | Status: SHIPPED | OUTPATIENT
Start: 2020-04-27 | End: 2020-05-08

## 2020-04-27 RX ORDER — OXYCODONE HYDROCHLORIDE 10 MG/1
10 TABLET, FILM COATED, EXTENDED RELEASE ORAL EVERY 12 HOURS
Qty: 60 TABLET | Refills: 0 | Status: SHIPPED | OUTPATIENT
Start: 2020-04-27 | End: 2020-05-29

## 2020-04-27 NOTE — PROGRESS NOTES
Writer left VM for patient regarding refill for oxycodone IR 10mg. Dr Duncan refilled at pharmacy of choice as well as a prescription for Narcan. Writer left VM with this information and call back request for further instructions/information.

## 2020-04-27 NOTE — TELEPHONE ENCOUNTER
Oxycodone refill but using 10 mg. No significant change in dose (increased from 28 to 30 tabs--equivalent of 56 to 60 tabs of th 5 mg). However, I did increase the frequency to q6h PRN.    Also ordered for Narcan spray to have at home, as all patients using dosing like these should have it available and tell close family members where it is and how/when to use it.     Eric Duncan MD  Hematologist  Division of Hematology, Oncology, and Transplantation  [facility]  [facility]  Pager: [phone]

## 2020-04-27 NOTE — PROGRESS NOTES
Writer placed call to patient to review medication refill needed. Patient needing refill of oxycontin 10 mg every 12 hours. Last fill on 3/27/2020, patient states she is completely out.Patient also asking if she can come in for IVF/pain meds tomorrow, unable to secure ride to clinic today. Dr Duncan approved IVF/pain meds for tomorrow, patient scheduled for IVF/pain meds in [facility] at 1:00 pm per Melissa Mcclendon, Charge RN.

## 2020-04-28 ENCOUNTER — INFUSION THERAPY VISIT (OUTPATIENT)
Dept: ONCOLOGY | Facility: CLINIC | Age: 20
End: 2020-04-28
Attending: PEDIATRICS
Payer: COMMERCIAL

## 2020-04-28 VITALS
TEMPERATURE: 98 F | RESPIRATION RATE: 18 BRPM | HEART RATE: 98 BPM | SYSTOLIC BLOOD PRESSURE: 111 MMHG | OXYGEN SATURATION: 95 % | DIASTOLIC BLOOD PRESSURE: 68 MMHG

## 2020-04-28 DIAGNOSIS — D57.00 SICKLE CELL PAIN CRISIS (H): Primary | ICD-10-CM

## 2020-04-28 PROCEDURE — 96374 THER/PROPH/DIAG INJ IV PUSH: CPT

## 2020-04-28 PROCEDURE — 25800030 ZZH RX IP 258 OP 636: Mod: ZF | Performed by: PHYSICIAN ASSISTANT

## 2020-04-28 PROCEDURE — 25000128 H RX IP 250 OP 636: Mod: ZF | Performed by: PHYSICIAN ASSISTANT

## 2020-04-28 PROCEDURE — 96361 HYDRATE IV INFUSION ADD-ON: CPT

## 2020-04-28 PROCEDURE — 96376 TX/PRO/DX INJ SAME DRUG ADON: CPT

## 2020-04-28 PROCEDURE — 25000128 H RX IP 250 OP 636: Mod: ZF | Performed by: PEDIATRICS

## 2020-04-28 RX ORDER — HEPARIN SODIUM (PORCINE) LOCK FLUSH IV SOLN 100 UNIT/ML 100 UNIT/ML
5 SOLUTION INTRAVENOUS
Status: DISCONTINUED | OUTPATIENT
Start: 2020-04-28 | End: 2020-04-28 | Stop reason: HOSPADM

## 2020-04-28 RX ORDER — ONDANSETRON 8 MG/1
8 TABLET, FILM COATED ORAL
Status: CANCELLED
Start: 2020-04-28

## 2020-04-28 RX ORDER — DIPHENHYDRAMINE HCL 25 MG
25 CAPSULE ORAL
Status: CANCELLED
Start: 2020-04-28

## 2020-04-28 RX ORDER — MORPHINE SULFATE 2 MG/ML
2 INJECTION, SOLUTION INTRAMUSCULAR; INTRAVENOUS
Status: CANCELLED
Start: 2020-04-28

## 2020-04-28 RX ORDER — HEPARIN SODIUM (PORCINE) LOCK FLUSH IV SOLN 100 UNIT/ML 100 UNIT/ML
5 SOLUTION INTRAVENOUS
Status: CANCELLED | OUTPATIENT
Start: 2020-04-28

## 2020-04-28 RX ORDER — MORPHINE SULFATE 2 MG/ML
2 INJECTION, SOLUTION INTRAMUSCULAR; INTRAVENOUS
Status: DISCONTINUED | OUTPATIENT
Start: 2020-04-28 | End: 2020-04-28 | Stop reason: HOSPADM

## 2020-04-28 RX ORDER — HEPARIN SODIUM,PORCINE 10 UNIT/ML
5 VIAL (ML) INTRAVENOUS
Status: CANCELLED | OUTPATIENT
Start: 2020-04-28

## 2020-04-28 RX ADMIN — MORPHINE SULFATE 2 MG: 2 INJECTION, SOLUTION INTRAMUSCULAR; INTRAVENOUS at 14:59

## 2020-04-28 RX ADMIN — MORPHINE SULFATE 2 MG: 2 INJECTION, SOLUTION INTRAMUSCULAR; INTRAVENOUS at 13:06

## 2020-04-28 RX ADMIN — Medication 5 ML: at 15:18

## 2020-04-28 RX ADMIN — MORPHINE SULFATE 2 MG: 2 INJECTION, SOLUTION INTRAMUSCULAR; INTRAVENOUS at 14:01

## 2020-04-28 RX ADMIN — DEXTROSE AND SODIUM CHLORIDE 500 ML: 5; 450 INJECTION, SOLUTION INTRAVENOUS at 12:57

## 2020-04-28 ASSESSMENT — PAIN SCALES - GENERAL: PAINLEVEL: EXTREME PAIN (9)

## 2020-04-28 NOTE — PATIENT INSTRUCTIONS
Contact Numbers    [facility]: [phone]    Call with chills and/or temperature greater than or equal to 100.5, uncontrolled nausea/vomiting, diarrhea, constipation, dizziness, shortness of breath, chest pain, bleeding, unexplained bruising, or any new/concerning symptoms, questions/concerns.     If you are having any concerning symptoms or wish to speak to a provider before your next infusion visit, please call your care coordinator or triage to notify them so we can adequately serve you.       After Hours: [phone]    If after hours, weekends, or holidays, call main hospital  and ask for Oncology doctor on call.             Lab Results:  No results found for this or any previous visit (from the past 12 hour(s)).

## 2020-04-28 NOTE — PROGRESS NOTES
Infusion Nursing Note:  Jennifer Cervantes presents today for IVF and pain medication.    Patient seen by provider today: No    Note: Pt presented to clinic with c/o pain in back and arm 9/10.  Declined interventions other than IV pain medication and IVF.  Goal for pain is as low as we can get.  Pt requested discharge approximately 20 minutes following with pain tolerable at 7/10.    Intravenous Access:  Implanted Port.    Treatment Conditions:  Not Applicable.    Post Infusion Assessment:  Patient tolerated infusion without incident.  Blood return noted pre and post infusion.  Site patent and intact, free from redness, edema or discomfort.  No evidence of extravasations.  Access discontinued per protocol.    Discharge Plan:   Patient declined prescription refills.  Discharge instructions reviewed with: Patient.  Patient and/or family verbalized understanding of discharge instructions and all questions answered.  AVS to patient via [employer].  Patient will return 5/8/2020 for next appointment with MD.   Patient discharged in stable condition accompanied by: self.  Departure Mode: Ambulatory.    Lupe Bolton RN

## 2020-05-08 ENCOUNTER — VIRTUAL VISIT (OUTPATIENT)
Dept: ONCOLOGY | Facility: CLINIC | Age: 20
End: 2020-05-08
Attending: PEDIATRICS
Payer: COMMERCIAL

## 2020-05-08 DIAGNOSIS — D57.1 HB-SS DISEASE WITHOUT CRISIS (H): Primary | ICD-10-CM

## 2020-05-08 DIAGNOSIS — D57.00 SICKLE CELL CRISIS (H): ICD-10-CM

## 2020-05-08 DIAGNOSIS — Z86.73 HISTORY OF STROKE: ICD-10-CM

## 2020-05-08 PROCEDURE — 40000114 ZZH STATISTIC NO CHARGE CLINIC VISIT

## 2020-05-08 PROCEDURE — 99213 OFFICE O/P EST LOW 20 MIN: CPT | Mod: GT | Performed by: PEDIATRICS

## 2020-05-08 RX ORDER — CELECOXIB 100 MG/1
100 CAPSULE ORAL 2 TIMES DAILY
Qty: 60 CAPSULE | Refills: 3 | Status: SHIPPED | OUTPATIENT
Start: 2020-05-08 | End: 2020-11-02

## 2020-05-08 RX ORDER — OXYCODONE HYDROCHLORIDE 10 MG/1
10 TABLET ORAL EVERY 6 HOURS PRN
Qty: 30 TABLET | Refills: 0 | Status: SHIPPED | OUTPATIENT
Start: 2020-05-08 | End: 2020-05-15

## 2020-05-08 RX ORDER — HYDROXYUREA 500 MG/1
2000 CAPSULE ORAL DAILY
Qty: 120 CAPSULE | Refills: 4 | Status: SHIPPED | OUTPATIENT
Start: 2020-05-08 | End: 2020-06-19

## 2020-05-08 RX ORDER — MEDROXYPROGESTERONE ACETATE 2.5 MG/1
TABLET ORAL
COMMUNITY
Start: 2020-04-24 | End: 2020-08-17

## 2020-05-08 NOTE — PROGRESS NOTES
"Jennifre Cervantes is a 20 year old female who is being evaluated via a billable video visit.      The patient has been notified of following:     \"This video visit will be conducted via a call between you and your physician/provider. We have found that certain health care needs can be provided without the need for an in-person physical exam.  This service lets us provide the care you need with a video conversation.  If a prescription is necessary we can send it directly to your pharmacy.  If lab work is needed we can place an order for that and you can then stop by our lab to have the test done at a later time.    Video visits are billed at different rates depending on your insurance coverage.  Please reach out to your insurance provider with any questions.    If during the course of the call the physician/provider feels a video visit is not appropriate, you will not be charged for this service.\"    Patient has given verbal consent for Video visit? Yes    How would you like to obtain your AVS? MyChart    Patient would like the video invitation sent by: Text to cell phone: [phone]    Will anyone else be joining your video visit? No         I have reviewed and updated the patient's allergies and medication list.    Concerns: No new concerns.   Refills: Patient is unsure.      Ember Campos CMA        Video-Visit Details    Type of service:  Video Visit    Video Start Time: 1:01 PM  Video End Time: 1:19 PM    Originating Location (pt. Location): Home    Distant Location (provider location):  [facility]     Platform used for Video Visit: Clarisa Duncan MD        "

## 2020-05-08 NOTE — PATIENT INSTRUCTIONS
Jennifer,  I am glad you are doing well. Please call next week if you think you will need an infusion. I am scheduling for transfusion in 2 weeks but I think after that we should try spreading it to every 8 weeks.     Also, please consider sitting down with our adult BMT team about transplant. A visit with them does not mean you have to go through with it but they can give you the most up to date information you need to make an informed decision about what you want to do. I think it will help remove some of the burdens on your body that sickle cell and transfusion have done but it is not for everyone.    Dr. Duncan

## 2020-05-08 NOTE — PROGRESS NOTES
"  Sickle Cell Clinic Follow Up Outpatient Visit    Date of encounter: 5/8/2020    The patient has been notified of following:     \"This video visit will be conducted via a call between you and your physician/provider. We have found that certain health care needs can be provided without the need for an in-person physical exam.  This service lets us provide the care you need with a video conversation.  If a prescription is necessary we can send it directly to your pharmacy.  If lab work is needed we can place an order for that and you can then stop by our lab to have the test done at a later time.    Video visits are billed at different rates depending on your insurance coverage.  Please reach out to your insurance provider with any questions.    If during the course of the call the physician/provider feels a video visit is not appropriate, you will not be charged for this service.\"    Patient has given verbal consent for Video visit? Yes    How would you like to obtain your AVS? Leonarda    Patient would like the video invitation sent by: Text to cell phone: [phone]    Will anyone else be joining your video visit? No         I have reviewed and updated the patient's allergies and medication list.    Concerns: No new concerns.   Refills: Patient is unsure.      Ember Campos CMA        Video-Visit Details    Type of service:  Video Visit    Video Start Time: 1:01 PM  Video End Time: 1:19 PM    Originating Location (pt. Location): Home    Distant Location (provider location): Home  Platform used for Video Visit: Clarisa Duncan MD      ------------------------------------------------  Visit Notes    Jennifer Cervantes is a 20 year old female who is has a follow up outpatient hematology visit for Hb SS.    Jennifer's visit was done via video due to COVID19 restrictions.    Interval History  Jennifer has had a good couple weeks, though she is having some mild asthma flares as of the past few days. She needed " "to come in for one infusion a week ago. That seemed to help quite a bit. She is taking her oxycodone 1 tab (now 10 mg) every 6-8 hours as needed and has 2-3 left. Her pain is mostly in her arms and back but is tolerable. She is still having some of the neuropathic pain on her neck when she touches it but it is improved and she feels that she has been better about avoiding touching it. No fever. She has had runny nose and occasional cough. No other ill symptoms. She is tolerating her increased dose of Jadenu.      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 She also has significant anxiety and depression with a strong anxiety about transferring to the adult clinic. She usually has pain crises secondary to the anxiety. This \"fear of the unknown\" is significant enough that she wants to tour the inpatient floor before the transition or before she gets admitted there. [facility] with recurrent pain crises and is actually out on pass now but is still admitted as of Friday 2/28. She has no real support from family at home and that, combined with her cognitive delays, make her care even more complex. She is having some back pain today which is a frequent location for her. She does say that her oral options do take an edge off. No fevers or cough, chest pain, dyspnea, bruising, or GI symptoms today. She has gotten a couple doses of Desferal for iron overload as she has been on chronic transfusions post-stroke for a long time. She is up to date on immunizations and got to meet with Daya Carter last week (also while on pass). It is unclear when she will " discharge from [facility] but based on recent history, she thinks she will have a pain crisis soon after discharge.    Key results prior to referral:  MR ABDOMEN W/O CONTRAST ([facility]), 10/3/2017  (unlikely to be most recent one)     Comparison: 12/28/2015   Clinical history: Sickle cell disease   Findings:     Average liver iron concentration:  28.6 mg/g dry tissue, previously 22.8 mg/g dry tissue (NR: 0.17-1.8)  513 mmol/kg dry tissue, previously 400.8 mmol/kg dry tissue (NR: 3-33)     There is also iron deposition in the spleen, which is enlarged.  Prominent vessels in the left upper quadrant, which may be related to varices, are unchanged from the comparison examinations.                                                            Impression:  1. Increase in elevated liver concentration.  2. Splenomegaly. Question portal hypertension.     WAYNE CURTIS MD    11/4/19  MRI Pelvis  Summary:  Marrow changes consistent with SCD but no convincing evidence of AVN at this time.     1/28/20 MRI/MRA  Comparison 5/8/15  Summary:  Stable appearance of the brain and cerebral vasculature compared to 5/8/2015, including remote left MCA teritory infarct. No evidence of new infarct or new abnormality on MRA    3/10/2020 Cardiac MRI      Review of systems:  A complete 14 point review of systems was completed. All were negative except for what was reported in the HPI or highlighted here.    --------------------------------  Plan last reviewed with patient: 5/8/2020    Patient background: 19yo F, enjoys movies and kids though there are times where she does not really want to talk to people. Does not have a lot of social support at home.     Sickle Cell Disease History  Primary Hematologist: Perla  Genotype: SS  Acute Pain Crisis Treatment:    ER/Acute Care/Infusion Clinic:   o Morphine 2 mg IVP/SC Q1H X 3 doses  o Toradol  o Maintenance IV fluids with D5 half-normal saline  o Other: Benadryl PO and Zofran 8 mg IV PRN itching or  nausea    Inpatient:  o Opioid: Morphine 2 mg IV Q1H PRN until PCA starts  o PCA plan: (Consistent with [facility] pain plan)  - PCA button dose: Morphine 1 mg  - Lockout: 10 minutes  - Continuous Infusion: consider 1 mg/hr  - One hr max: 6 mg  o [facility] had success weaning to OxyCONTIN 10 mg q12h with Morphine 0.7 mg IV q10 min PRN rather than continuous infusion  o Other Medications: Benadryl, Zofran  o If PCA not working, she Has had success with ketamine starting at 4mg/h and advancing only to 6mg/h, as 8mg/h made her feel quite poorly.  o If giving scheduled toradol, hold ASA (ok to give celebrex if she prefers)  o Supportive Care: Docusate Senna  Chronic Pain Medications:    Home regimen as of 2/4/20 at [facility]: OxyCONTIN 10 mg po q 12 hrs and oxycodone 5-10 mg p.o. q.4-6 hours p.r.n. breakthrough pain.  She also continues on Celebrex, and Zoloft among other medications.    -Also benefits from mental health visits, acupuncture  Baseline Hemoglobin: 9.5 g/dL (on chronic transfusions)  Hydroxyurea use: Yes  H/O blood transfusions: Yes, several (iron overload)    H/O Transfusion Reactions: no    Antibodies:none  H/O Acute Chest Syndrome: Yes    Last episode: 10/2019     ICU/intubation: unsure  H/O Stroke: Yes (managed with chronic transfusions (has left her with neurodevelopmental deficits)  H/O VTE: no  H/O Cholecystectomy or Splenectomy: no  H/O Asthma, Pulm HTN, AVN, Leg Ulcers, Nephropathy, Retinopathy, etc: Iron overload, asthma, chronic lung disease    EMERGENCY CARE PLAN  DO NOT TRANSFUSE WITHOUT DISCUSSING WITH HEMATOLOGY ATTENDING (available 24/7).       TRANSFUSION IS RISKY DUE TO RISK OF ALLOIMMUNIZATION AGAINST RBC ANTIGENS AND IRON OVERLOAD.  INDICATIONS FOR TRANSFUSION ARE ACUTE STROKE AND ACUTE CHEST SYNDROME (hypoxia plus infiltrate, not chest pain).  DO NOT TRANSFUSE FOR ANEMIA      -------------------------------------------    Sickle Cell Disease Comprehensive  Checklist    Bone Health/Avascular Necrosis Screening/Symptoms (each visit): none today    Leg Ulcer evaluation (every visit): none    Hypertension (every visit): none    Last ophthalmologic exam: within last year (timing unsure)    Last urinalysis for microalbuminuria/CKD (annually): within last year    Last pulmonary evaluation (asthma, AMAN, pulm HTN): within last year    Stroke/silent cerebral infarct Hx (Y/N): Yes    Last PCP Visit: 2/2020    Vaccines:  o PCV13: 5/13/19  o Pneumovax (PPSV23): 3/04, 10/09, 7/12/19 (next due 7/2024)  o Menactra: 4/2010, 9/2015 (MCV due Sept 2020)  o Trumemba:  o Influenza: got 19-20 vaccine    Audiology (chelation): needed post-transition    Past Medical History:  Past Medical History:   Diagnosis Date     Anemia      Anxiety      Bleeding disorder (H)      Blood clotting disorder (H)      Cerebral infarction (H) 2015     Cognitive developmental delay     low IQ. 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 (H)     right hand contractures     Iron overload due to repeated red blood cell transfusions      Migraines      Oppositional defiant behavior      Uncomplicated asthma        Past Surgical History:  Past Surgical History:   Procedure Laterality Date     AS INSERT TUNNELED CV 2 CATH W/O PORT/PUMP       C BREAST REDUCTION (INCLUDES LIPO) TIER 3 Bilateral 04/23/2019     IR CVC NON TUNNEL PLACEMENT  4/7/2020     REPAIR TENDON ELBOW Right 10/2/2019    Procedure: Right Elbow Flexor Lengthening, Flexor Pronator Slide Of Wrist and Finger, Thumb Adductor Lengthening;  Surgeon: Anai Franco MD;  Location: [facility] OR     TONSILLECTOMY Bilateral 10/2/2019    Procedure: Bilateral Tonsillectomy;  Surgeon: Farhana Guy MD;  Location: [facility] OR       Family History:   Family History   Problem Relation Age of Onset     Sickle Cell Trait Mother       "Sickle Cell Trait Father        Social History:  Social History     Socioeconomic History     Marital status: Single     Spouse name: Not on file     Number of children: Not on file     Years of education: Not on file     Highest education level: Not on file   Occupational History     Not on file   Social Needs     Financial resource strain: Not on file     Food insecurity     Worry: Not on file     Inability: Not on file     Transportation needs     Medical: Not on file     Non-medical: Not on file   Tobacco Use     Smoking status: Never Smoker     Smokeless tobacco: Never Used   Substance and Sexual Activity     Alcohol use: Not Currently     Alcohol/week: 0.0 standard drinks     Drug use: Never     Sexual activity: Not Currently     Partners: Male     Birth control/protection: Other   Lifestyle     Physical activity     Days per week: Not on file     Minutes per session: Not on file     Stress: Not on file   Relationships     Social connections     Talks on phone: Not on file     Gets together: Not on file     Attends Tenriism service: Not on file     Active member of club or organization: Not on file     Attends meetings of clubs or organizations: Not on file     Relationship status: Not on file     Intimate partner violence     Fear of current or ex partner: Not on file     Emotionally abused: Not on file     Physically abused: Not on file     Forced sexual activity: Not on file   Other Topics Concern     Parent/sibling w/ CABG, MI or angioplasty before 65F 55M? Not Asked   Social History Narrative    Lives with mom and extended family but \"toxic environment\" per her report. She would like to move out but cannot financially do so. She has minimal support at home despite her significant SCD comorbidities and cognitive delay from stroke.       Medications:  Current Outpatient Medications   Medication     acetaminophen (TYLENOL) 325 MG tablet     albuterol (PROVENTIL) (2.5 MG/3ML) 0.083% neb solution     aspirin " (ASA) 81 MG tablet     aspirin (ASPIRIN) 81 MG EC tablet     Budesonide-Formoterol Fumarate (SYMBICORT IN)     celecoxib (CELEBREX) 100 MG capsule     diphenhydrAMINE (BENADRYL) 25 MG capsule     EPINEPHrine (EPIPEN) 0.3 MG/0.3ML injection     erythromycin ethylsuccinate (E.E.S.) 400 MG tablet     gabapentin (NEURONTIN) 300 MG capsule     GNP SENNA-LAX 8.6 MG tablet     hydroxyurea (HYDREA) 500 MG capsule     JADENU 360 MG tablet     naloxone (NARCAN) 4 MG/0.1ML nasal spray     omeprazole (PRILOSEC) 40 MG DR capsule     ondansetron (ZOFRAN) 8 MG tablet     oxyCODONE IR (ROXICODONE) 10 MG tablet     OXYCONTIN 10 MG 12 hr tablet     pregabalin (LYRICA) 75 MG capsule     sertraline (ZOLOFT) 100 MG tablet     medroxyPROGESTERone (PROVERA) 2.5 MG tablet     No current facility-administered medications for this visit.          Physical Exam:   No vitals performed as this is a video visit.     GENERAL: healthy, alert and no distress, She seems quite happy today  EYES: Eyes grossly normal to inspection, conjunctivae and sclerae normal  HENT: normocephalic/atraumatic.  External ears, nose and mouth without ulcers or lesions.  NECK/CHEST:No erythema or drainage noted on neck. She did seem to touch it incidentally but did not flinch with pain.  RESP: no audible wheeze, cough, or visible cyanosis.  No visible retractions or increased work of breathing.  Able to speak fully in complete sentences.  NEURO: Cranial nerves grossly intact, mentation intact and speech normal  PSYCH: mentation appears normal, affect normal/bright, judgement and insight intact, normal speech and appearance well-groomed  SKIN: Normal color for ethnicity. No notable pallor or icterus.    Imaging: none today    Assessment:  Jennifer Cervantes is a 20 year old female with HbSS. Her disease has been complicated by stroke leading to significant cognitive delay and right UE hemiparesis, high anxiety leading to frequent pain crises on top of chronic pain syndrome,  poor social structure at home with no real support, and iron overload secondary to repeated transfusions. She seems to be doing quite well today based on the video visit,     We talked some today about the need for a second port placement if we were going to need regular exchange transfusions. She recalled this being mentioned by Julieta. She is not super enthused by having another port. We also had our first discussion about the potential for BMT, as the indications have evolved a bit from the last time she said she was told about it in her pediatric care, sometime 4-5 years ago. Her stroke history, iron overload, and recurrent pain issues make her disease phenotype quite severe. Her iron overload is outpacing her chelation and I am worried that we will always be chasing without thinking more broadly about our approach, including curative therapies like BMT. She has not really wanted to do it before but it's been a long time and she is willing to think about it again.    Plan:  1) HbSS and complications   -- Pain plan reviewed               -- Mentioned BMT. Will follow up on referral at a later visit.   --She has been on q6 week transfusions (exchanged 4/7). Had CVA ~1 yo and another TIA ~2014 during a trial off transfusions and just HU. We may need to consider this again given her significant iron overload but she will take time to become comfortable with this. Next Transfusion (+/- phlebotomy depending on what our nurses decide on access) TBD.   --Needs aggressive chelation. Jadenu now 1440 mg daily and well-tolerated per report. Also need to have audiology appt when COVID restrictions zaid.   --Continue ASA post-stroke  2) Labs: none today. CBC, CMP, ferritin at next visit  3) Medication Changes: Oxycodone, Celebrex, HU refilled.  4)  Other orders/recommendations: Will try to continue spreading out transfusions over time until we can stop.  5)  HCM: PCP is Daya Carter. Need to get SW closely involved. Appt  with her today  6)  Psych: She remains on the 150 mg Zoloft as recommended last week. Agree with titration plan as described.  7)  Follow up plan: Due to COVID-19 concerns, we will likely need to plan for limiting to every visits only for transfusions (q6 weeks for now, next one mid May). Phone visits weekly alternating with Andrei Machado or myself. May benefit from crizanlizumab  8) Next visit: 2 weeks in-person for transfusion.    It was a pleasure talking to Jennifer today and in continuing to guide her care and transition to adult care moving forward.    I spent a total of 18 minutes on the video with Jennifer during today's office visit. See note for details.    Video contact  Phone call start: 1:01 PM  Phone call end: 1:19 PM      Eric Duncan MD  Hematologist  Division of Hematology, Oncology, and Transplantation  [facility]  [facility]  Pager: [phone]

## 2020-05-11 ENCOUNTER — PATIENT OUTREACH (OUTPATIENT)
Dept: ONCOLOGY | Facility: CLINIC | Age: 20
End: 2020-05-11

## 2020-05-11 DIAGNOSIS — D57.1 HB-SS DISEASE WITHOUT CRISIS (H): Primary | ICD-10-CM

## 2020-05-11 NOTE — PROGRESS NOTES
Writer placed return call to patient for following requests:    Patient needs a copy of her last depo-provera shot administered at [facility] so she can complete her Gynecology appointment arranged by PCP on 5/14/2020. She needs this to verify last dosage or they will not give her the shot. Writer advised patient that we would work on finding that and faxing to clinic.    Patient also requested infusion appt for IVF/pain meds. Patient states that her pain is in her left leg at approximately shin/calf area below the knee but above her ankle. Patient also states that she has noticed, and has been told by others, that her left leg looks bigger than normal. Patient states that it's hard to tell by comparison as her right leg is smaller from prior stroke side effects but she knows that it looks bigger than normal.    Writer discussed with Dr David Duncan and orders for LLE US placed for r/out of DVT. Writer also sent message to Clinic Coordinator to arrange for labs/US/Infusion for tomorrow per Dr Duncan and virtual visit with Cornelia Suresh PA-C, Wednesday at 1:20 pm.

## 2020-05-12 ENCOUNTER — APPOINTMENT (OUTPATIENT)
Dept: LAB | Facility: CLINIC | Age: 20
End: 2020-05-12
Attending: PEDIATRICS
Payer: COMMERCIAL

## 2020-05-12 ENCOUNTER — PATIENT OUTREACH (OUTPATIENT)
Dept: ONCOLOGY | Facility: CLINIC | Age: 20
End: 2020-05-12

## 2020-05-12 ENCOUNTER — ANCILLARY PROCEDURE (OUTPATIENT)
Dept: ULTRASOUND IMAGING | Facility: CLINIC | Age: 20
End: 2020-05-12
Attending: PEDIATRICS
Payer: COMMERCIAL

## 2020-05-12 ENCOUNTER — INFUSION THERAPY VISIT (OUTPATIENT)
Dept: TRANSPLANT | Facility: CLINIC | Age: 20
End: 2020-05-12
Attending: PEDIATRICS
Payer: COMMERCIAL

## 2020-05-12 VITALS
DIASTOLIC BLOOD PRESSURE: 82 MMHG | SYSTOLIC BLOOD PRESSURE: 124 MMHG | TEMPERATURE: 98.9 F | BODY MASS INDEX: 26.98 KG/M2 | HEART RATE: 100 BPM | WEIGHT: 157.2 LBS | RESPIRATION RATE: 16 BRPM

## 2020-05-12 DIAGNOSIS — D57.1 HB-SS DISEASE WITHOUT CRISIS (H): ICD-10-CM

## 2020-05-12 DIAGNOSIS — D57.1 HB-SS DISEASE WITHOUT CRISIS (H): Primary | ICD-10-CM

## 2020-05-12 DIAGNOSIS — D57.00 SICKLE CELL PAIN CRISIS (H): ICD-10-CM

## 2020-05-12 LAB
ALBUMIN SERPL-MCNC: 3.9 G/DL (ref 3.4–5)
ALP SERPL-CCNC: 73 U/L (ref 40–150)
ALT SERPL W P-5'-P-CCNC: 58 U/L (ref 0–50)
ANION GAP SERPL CALCULATED.3IONS-SCNC: 6 MMOL/L (ref 3–14)
AST SERPL W P-5'-P-CCNC: 60 U/L (ref 0–45)
BASOPHILS # BLD AUTO: 0.1 10E9/L (ref 0–0.2)
BASOPHILS NFR BLD AUTO: 1 %
BILIRUB SERPL-MCNC: 3.1 MG/DL (ref 0.2–1.3)
BUN SERPL-MCNC: 7 MG/DL (ref 7–30)
CALCIUM SERPL-MCNC: 9 MG/DL (ref 8.5–10.1)
CHLORIDE SERPL-SCNC: 109 MMOL/L (ref 94–109)
CO2 SERPL-SCNC: 25 MMOL/L (ref 20–32)
CREAT SERPL-MCNC: 0.46 MG/DL (ref 0.52–1.04)
DIFFERENTIAL METHOD BLD: ABNORMAL
EOSINOPHIL # BLD AUTO: 0.2 10E9/L (ref 0–0.7)
EOSINOPHIL NFR BLD AUTO: 1.7 %
ERYTHROCYTE [DISTWIDTH] IN BLOOD BY AUTOMATED COUNT: 22.7 % (ref 10–15)
FERRITIN SERPL-MCNC: 9688 NG/ML (ref 12–150)
GFR SERPL CREATININE-BSD FRML MDRD: >90 ML/MIN/{1.73_M2}
GLUCOSE SERPL-MCNC: 81 MG/DL (ref 70–99)
HCT VFR BLD AUTO: 23.6 % (ref 35–47)
HGB BLD-MCNC: 8.3 G/DL (ref 11.7–15.7)
IMM GRANULOCYTES # BLD: 0.1 10E9/L (ref 0–0.4)
IMM GRANULOCYTES NFR BLD: 0.8 %
LYMPHOCYTES # BLD AUTO: 2.5 10E9/L (ref 0.8–5.3)
LYMPHOCYTES NFR BLD AUTO: 17.8 %
MCH RBC QN AUTO: 31.4 PG (ref 26.5–33)
MCHC RBC AUTO-ENTMCNC: 35.2 G/DL (ref 31.5–36.5)
MCV RBC AUTO: 89 FL (ref 78–100)
MONOCYTES # BLD AUTO: 1 10E9/L (ref 0–1.3)
MONOCYTES NFR BLD AUTO: 7.1 %
NEUTROPHILS # BLD AUTO: 10.1 10E9/L (ref 1.6–8.3)
NEUTROPHILS NFR BLD AUTO: 71.6 %
NRBC # BLD AUTO: 0.5 10*3/UL
NRBC BLD AUTO-RTO: 4 /100
PLATELET # BLD AUTO: 290 10E9/L (ref 150–450)
POTASSIUM SERPL-SCNC: 3.8 MMOL/L (ref 3.4–5.3)
PROT SERPL-MCNC: 7.5 G/DL (ref 6.8–8.8)
RBC # BLD AUTO: 2.64 10E12/L (ref 3.8–5.2)
RETICS # AUTO: 681.1 10E9/L (ref 25–95)
RETICS/RBC NFR AUTO: 25.8 % (ref 0.5–2)
SODIUM SERPL-SCNC: 139 MMOL/L (ref 133–144)
WBC # BLD AUTO: 14.2 10E9/L (ref 4–11)

## 2020-05-12 PROCEDURE — 96361 HYDRATE IV INFUSION ADD-ON: CPT

## 2020-05-12 PROCEDURE — 25000128 H RX IP 250 OP 636: Mod: ZF | Performed by: PEDIATRICS

## 2020-05-12 PROCEDURE — 85025 COMPLETE CBC W/AUTO DIFF WBC: CPT | Performed by: PEDIATRICS

## 2020-05-12 PROCEDURE — 96374 THER/PROPH/DIAG INJ IV PUSH: CPT

## 2020-05-12 PROCEDURE — 25800030 ZZH RX IP 258 OP 636: Mod: ZF | Performed by: PHYSICIAN ASSISTANT

## 2020-05-12 PROCEDURE — 80053 COMPREHEN METABOLIC PANEL: CPT | Performed by: PEDIATRICS

## 2020-05-12 PROCEDURE — 25000128 H RX IP 250 OP 636: Mod: ZF | Performed by: PHYSICIAN ASSISTANT

## 2020-05-12 PROCEDURE — 85045 AUTOMATED RETICULOCYTE COUNT: CPT | Performed by: PEDIATRICS

## 2020-05-12 PROCEDURE — 82728 ASSAY OF FERRITIN: CPT | Performed by: PEDIATRICS

## 2020-05-12 PROCEDURE — 96376 TX/PRO/DX INJ SAME DRUG ADON: CPT

## 2020-05-12 RX ORDER — HEPARIN SODIUM (PORCINE) LOCK FLUSH IV SOLN 100 UNIT/ML 100 UNIT/ML
5 SOLUTION INTRAVENOUS
Status: CANCELLED | OUTPATIENT
Start: 2020-05-12

## 2020-05-12 RX ORDER — MORPHINE SULFATE 2 MG/ML
2 INJECTION, SOLUTION INTRAMUSCULAR; INTRAVENOUS
Status: CANCELLED
Start: 2020-05-12

## 2020-05-12 RX ORDER — HEPARIN SODIUM,PORCINE 10 UNIT/ML
5 VIAL (ML) INTRAVENOUS
Status: CANCELLED | OUTPATIENT
Start: 2020-05-12

## 2020-05-12 RX ORDER — HEPARIN SODIUM (PORCINE) LOCK FLUSH IV SOLN 100 UNIT/ML 100 UNIT/ML
5 SOLUTION INTRAVENOUS EVERY 8 HOURS PRN
Status: DISCONTINUED | OUTPATIENT
Start: 2020-05-12 | End: 2020-05-12 | Stop reason: HOSPADM

## 2020-05-12 RX ORDER — DIPHENHYDRAMINE HCL 25 MG
25 CAPSULE ORAL
Status: DISCONTINUED | OUTPATIENT
Start: 2020-05-12 | End: 2020-05-12 | Stop reason: HOSPADM

## 2020-05-12 RX ORDER — ONDANSETRON 8 MG/1
8 TABLET, FILM COATED ORAL
Status: CANCELLED
Start: 2020-05-12

## 2020-05-12 RX ORDER — ONDANSETRON 8 MG/1
8 TABLET, ORALLY DISINTEGRATING ORAL
Status: DISCONTINUED | OUTPATIENT
Start: 2020-05-12 | End: 2020-05-12 | Stop reason: HOSPADM

## 2020-05-12 RX ORDER — MORPHINE SULFATE 2 MG/ML
2 INJECTION, SOLUTION INTRAMUSCULAR; INTRAVENOUS
Status: DISCONTINUED | OUTPATIENT
Start: 2020-05-12 | End: 2020-05-12 | Stop reason: HOSPADM

## 2020-05-12 RX ORDER — DIPHENHYDRAMINE HCL 25 MG
25 CAPSULE ORAL
Status: CANCELLED
Start: 2020-05-12

## 2020-05-12 RX ADMIN — Medication 5 ML: at 14:27

## 2020-05-12 RX ADMIN — MORPHINE SULFATE 2 MG: 2 INJECTION, SOLUTION INTRAMUSCULAR; INTRAVENOUS at 13:22

## 2020-05-12 RX ADMIN — Medication 5 ML: at 11:42

## 2020-05-12 RX ADMIN — DEXTROSE AND SODIUM CHLORIDE 500 ML: 5; 450 INJECTION, SOLUTION INTRAVENOUS at 12:24

## 2020-05-12 RX ADMIN — MORPHINE SULFATE 2 MG: 2 INJECTION, SOLUTION INTRAMUSCULAR; INTRAVENOUS at 14:20

## 2020-05-12 RX ADMIN — MORPHINE SULFATE 2 MG: 2 INJECTION, SOLUTION INTRAMUSCULAR; INTRAVENOUS at 12:26

## 2020-05-12 ASSESSMENT — PAIN SCALES - GENERAL: PAINLEVEL: EXTREME PAIN (8)

## 2020-05-12 NOTE — PROGRESS NOTES
"Infusion Nursing Note:  Jennifer Cervantes presents today for IVF/pain meds.    Patient seen by provider today: No   present during visit today: Not Applicable.    Note: Pt given 1/2 IVF (see MAR) and Morphine 2 mg x3.  Pain was \"8\" at the beginning and \"5\" and \"better\" after infusion.  Tolerated without incident.    Intravenous Access:  Implanted Port.    Treatment Conditions:  Not Applicable.      Post Infusion Assessment:  Patient tolerated infusion without incident.  Blood return noted pre and post infusion.  Site patent and intact, free from redness, edema or discomfort.  No evidence of extravasations.  Access discontinued per protocol.       Discharge Plan:   Discharge instructions reviewed with: Patient.  Patient and/or family verbalized understanding of discharge instructions and all questions answered.  Patient discharged in stable condition accompanied by: self.  Departure Mode: Ambulatory.    eDanna Tinoco RN                        "

## 2020-05-12 NOTE — PROGRESS NOTES
Writer received call back from RN at [facility]. RN is unable to provide that information and asked that a med list be requested from HIM and gave writer contact info. Writer called their HIM department and requested med list. HIM agent will send via fax.

## 2020-05-12 NOTE — PROGRESS NOTES
Write replaced call to patient to discuss a negative US of LLE, no DVT. Writer also discussed the possibility of meeting with BMT to discuss transplant and though patient feels strongly that she will not want to pursue this, she is willing to meet with team and hear options and ask questions. Writer advised Dr David Duncan who states that orders will be placed.

## 2020-05-12 NOTE — NURSING NOTE
Chief Complaint   Patient presents with     Port Draw     Labs drawn via port by RN in lab. Line flushed and hep locked. VS taken.     Neyda Sheriff RN

## 2020-05-13 ENCOUNTER — VIRTUAL VISIT (OUTPATIENT)
Dept: ONCOLOGY | Facility: CLINIC | Age: 20
End: 2020-05-13
Attending: PEDIATRICS
Payer: COMMERCIAL

## 2020-05-13 DIAGNOSIS — D57.00 SICKLE CELL PAIN CRISIS (H): Primary | ICD-10-CM

## 2020-05-13 PROCEDURE — 99213 OFFICE O/P EST LOW 20 MIN: CPT | Mod: GT | Performed by: PHYSICIAN ASSISTANT

## 2020-05-13 PROCEDURE — 40000114 ZZH STATISTIC NO CHARGE CLINIC VISIT

## 2020-05-13 RX ORDER — MEDROXYPROGESTERONE ACETATE 150 MG/ML
150 INJECTION, SUSPENSION INTRAMUSCULAR
COMMUNITY
Start: 2020-05-14 | End: 2022-04-21

## 2020-05-13 NOTE — PROGRESS NOTES
"Jennifer Cervantes is a 20 year old female who is being evaluated via a billable video visit.      The patient has been notified of following:     \"This video visit will be conducted via a call between you and your physician/provider. We have found that certain health care needs can be provided without the need for an in-person physical exam.  This service lets us provide the care you need with a video conversation.  If a prescription is necessary we can send it directly to your pharmacy.  If lab work is needed we can place an order for that and you can then stop by our lab to have the test done at a later time.    Video visits are billed at different rates depending on your insurance coverage.  Please reach out to your insurance provider with any questions.    If during the course of the call the physician/provider feels a video visit is not appropriate, you will not be charged for this service.\"    Patient has given verbal consent for Video visit? Yes    How would you like to obtain your AVS? Leonarda    Patient would like the video invitation sent by: Text to cell phone: [phone]    Will anyone else be joining your video visit? No      "

## 2020-05-13 NOTE — PROGRESS NOTES
"[facility] Follow Up Outpatient Visit    Date of encounter: May 13, 2020    Visit Notes    Jennifer Cervantes is a 20 year old female who is has a follow up outpatient hematology visit for Hb SS.    Jennifer's visit was done via video due to COVID19 restrictions.    She is an add on today for left lower leg pain and swelling.     Interval History  She started having LLE pain. Now she rate it 7.5/10. Was 8/10 yesterday.  Swelling improved/resolved now. No other swelling or redness. Oral meds are helping and she feels like she can manage with oral alone. She is taking oxycodone IR every 6 hours and Oxycontin BID. Heat doesn't help. Drinking lots of fluids. Some pain in back and sides, typical pain. No SOB, CP or cough. Denies f/c. No dysuria.     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 She also has significant anxiety and depression with a strong anxiety about transferring to the adult clinic. She usually has pain crises secondary to the anxiety. This \"fear of the unknown\" is significant enough that she wants to tour the inpatient floor before the transition or before she gets admitted there. [facility] recurrent pain crises and is actually out on pass now but is still admitted as of Friday 2/28. She has no real support from family at home and that, combined with her cognitive delays, make her care even more complex. She is having some back pain today which is a frequent location for her. She does say that her oral options do take an edge off. No fevers or cough, chest pain, dyspnea, bruising, or GI symptoms today. She has gotten a couple doses " of Desferal for iron overload as she has been on chronic transfusions post-stroke for a long time. She is up to date on immunizations and got to meet with Daya Carter last week (also while on pass). It is unclear when she will discharge from [facility] but based on recent history, she thinks she will have a pain crisis soon after discharge.      Review of systems:  A complete 14 point review of systems was completed. All were negative except for what was reported in the HPI or highlighted here.    --------------------------------  Plan last reviewed with patient: 5/8/2020    Patient background: 19yo F, enjoys movies and kids though there are times where she does not really want to talk to people. Does not have a lot of social support at home.     Sickle Cell Disease History  Primary Hematologist: Perla  Genotype: SS  Acute Pain Crisis Treatment:    ER/Acute Care/Infusion Clinic:   o Morphine 2 mg IVP/SC Q1H X 3 doses  o Toradol  o Maintenance IV fluids with D5 half-normal saline  o Other: Benadryl PO and Zofran 8 mg IV PRN itching or nausea    Inpatient:  o Opioid: Morphine 2 mg IV Q1H PRN until PCA starts  o PCA plan: (Consistent with [facility] pain plan)  - PCA button dose: Morphine 1 mg  - Lockout: 10 minutes  - Continuous Infusion: consider 1 mg/hr  - One hr max: 6 mg  o [facility] often had success weaning to OxyCONTIN 10 mg q12h with Morphine 0.7 mg IV q10 min PRN rather than continuous infusion  o Other Medications: Benadryl, Zofran  o If PCA not working, she Has had success with ketamine starting at 4mg/h and advancing only to 6mg/h, as 8mg/h made her feel quite poorly.  o If giving scheduled toradol, hold ASA (ok to give celebrex if she prefers)  o Supportive Care: Docusate Senna  Chronic Pain Medications:    Home regimen as of 2/4/20 at [facility]: OxyCONTIN 10 mg po q 12 hrs and oxycodone 5-10 mg p.o. q.4-6 hours p.r.n. breakthrough pain.  She also continues on Celebrex, and Zoloft among  other medications.    -Also benefits from mental health visits, acupuncture  Baseline Hemoglobin: 9.5 g/dL (on chronic transfusions)  Hydroxyurea use: Yes  H/O blood transfusions: Yes, several (iron overload)    H/O Transfusion Reactions: no    Antibodies:none  H/O Acute Chest Syndrome: Yes    Last episode: 10/2019     ICU/intubation: unsure  H/O Stroke: Yes (managed with chronic transfusions (has left her with neurodevelopmental deficits)  H/O VTE: no  H/O Cholecystectomy or Splenectomy: no  H/O Asthma, Pulm HTN, AVN, Leg Ulcers, Nephropathy, Retinopathy, etc: Iron overload, asthma, chronic lung disease    EMERGENCY CARE PLAN  DO NOT TRANSFUSE WITHOUT DISCUSSING WITH HEMATOLOGY ATTENDING (available 24/7).       TRANSFUSION IS RISKY DUE TO RISK OF ALLOIMMUNIZATION AGAINST RBC ANTIGENS AND IRON OVERLOAD.  INDICATIONS FOR TRANSFUSION ARE ACUTE STROKE AND ACUTE CHEST SYNDROME (hypoxia plus infiltrate, not chest pain).  DO NOT TRANSFUSE FOR ANEMIA      -------------------------------------------    Sickle Cell Disease Comprehensive Checklist    Bone Health/Avascular Necrosis Screening/Symptoms (each visit): none today    Leg Ulcer evaluation (every visit): none    Hypertension (every visit): none    Last ophthalmologic exam: within last year (timing unsure)    Last urinalysis for microalbuminuria/CKD (annually): within last year    Last pulmonary evaluation (asthma, AMAN, pulm HTN): within last year    Stroke/silent cerebral infarct Hx (Y/N): Yes    Last PCP Visit: 2/2020    Vaccines:  o PCV13: 5/13/19  o Pneumovax (PPSV23): 3/04, 10/09, 7/12/19 (next due 7/2024)  o Menactra: 4/2010, 9/2015 (MCV due Sept 2020)  o Trumemba:  o Influenza: got 19-20 vaccine    Audiology (chelation): needed post-transition    Past Medical History:  Past Medical History:   Diagnosis Date     Anemia      Anxiety      Bleeding disorder (H)      Blood clotting disorder (H)      Cerebral infarction (H) 2015     Cognitive developmental delay      low IQ. 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 (H)     right hand contractures     Iron overload due to repeated red blood cell transfusions      Migraines      Oppositional defiant behavior      Uncomplicated asthma        Past Surgical History:  Past Surgical History:   Procedure Laterality Date     AS INSERT TUNNELED CV 2 CATH W/O PORT/PUMP       C BREAST REDUCTION (INCLUDES LIPO) TIER 3 Bilateral 04/23/2019     IR CVC NON TUNNEL PLACEMENT  4/7/2020     REPAIR TENDON ELBOW Right 10/2/2019    Procedure: Right Elbow Flexor Lengthening, Flexor Pronator Slide Of Wrist and Finger, Thumb Adductor Lengthening;  Surgeon: Anai Franco MD;  Location: [facility] OR     TONSILLECTOMY Bilateral 10/2/2019    Procedure: Bilateral Tonsillectomy;  Surgeon: Farhana Guy MD;  Location: [facility] OR       Family History:   Family History   Problem Relation Age of Onset     Sickle Cell Trait Mother      Sickle Cell Trait Father        Social History:  Social History     Socioeconomic History     Marital status: Single     Spouse name: Not on file     Number of children: Not on file     Years of education: Not on file     Highest education level: Not on file   Occupational History     Not on file   Social Needs     Financial resource strain: Not on file     Food insecurity     Worry: Not on file     Inability: Not on file     Transportation needs     Medical: Not on file     Non-medical: Not on file   Tobacco Use     Smoking status: Never Smoker     Smokeless tobacco: Never Used   Substance and Sexual Activity     Alcohol use: Not Currently     Alcohol/week: 0.0 standard drinks     Drug use: Never     Sexual activity: Not Currently     Partners: Male     Birth control/protection: Other   Lifestyle     Physical activity     Days per week: Not on file     Minutes per session: Not on file      "Stress: Not on file   Relationships     Social connections     Talks on phone: Not on file     Gets together: Not on file     Attends Denominational service: Not on file     Active member of club or organization: Not on file     Attends meetings of clubs or organizations: Not on file     Relationship status: Not on file     Intimate partner violence     Fear of current or ex partner: Not on file     Emotionally abused: Not on file     Physically abused: Not on file     Forced sexual activity: Not on file   Other Topics Concern     Parent/sibling w/ CABG, MI or angioplasty before 65F 55M? Not Asked   Social History Narrative    Lives with mom and extended family but \"toxic environment\" per her report. She would like to move out but cannot financially do so. She has minimal support at home despite her significant SCD comorbidities and cognitive delay from stroke.       Medications:  Current Outpatient Medications   Medication     acetaminophen (TYLENOL) 325 MG tablet     albuterol (PROVENTIL) (2.5 MG/3ML) 0.083% neb solution     aspirin (ASA) 81 MG tablet     aspirin (ASPIRIN) 81 MG EC tablet     Budesonide-Formoterol Fumarate (SYMBICORT IN)     celecoxib (CELEBREX) 100 MG capsule     diphenhydrAMINE (BENADRYL) 25 MG capsule     EPINEPHrine (EPIPEN) 0.3 MG/0.3ML injection     erythromycin ethylsuccinate (E.E.S.) 400 MG tablet     gabapentin (NEURONTIN) 300 MG capsule     GNP SENNA-LAX 8.6 MG tablet     hydroxyurea (HYDREA) 500 MG capsule     JADENU 360 MG tablet     medroxyPROGESTERone (PROVERA) 2.5 MG tablet     naloxone (NARCAN) 4 MG/0.1ML nasal spray     omeprazole (PRILOSEC) 40 MG DR capsule     ondansetron (ZOFRAN) 8 MG tablet     oxyCODONE IR (ROXICODONE) 10 MG tablet     OXYCONTIN 10 MG 12 hr tablet     pregabalin (LYRICA) 75 MG capsule     sertraline (ZOLOFT) 100 MG tablet     [START ON 5/14/2020] medroxyPROGESTERone (DEPO-PROVERA) 150 MG/ML IM injection     No current facility-administered medications for this " visit.          Physical Exam:   No vitals performed as this is a video visit.     GENERAL: healthy, alert and no distress, Tired, non toxic appearing   EYES: Eyes grossly normal to inspection, conjunctivae and sclerae normal  HENT: normocephalic/atraumatic.  External ears, nose and mouth without ulcers or lesions.  NECK/CHEST:No erythema or drainage noted on neck.  RESP: no audible wheeze, cough, or visible cyanosis.  No visible retractions or increased work of breathing.  Able to speak fully in complete sentences.  NEURO: Cranial nerves grossly intact, mentation intact and speech normal  PSYCH: mentation appears normal, affect normal/bright, judgement and insight intact, normal speech and appearance well-groomed  SKIN: Normal color for ethnicity. No notable pallor or icterus.    Labs:      5/12/2020 11:48   Sodium 139   Potassium 3.8   Chloride 109   Carbon Dioxide 25   Urea Nitrogen 7   Creatinine 0.46 (L)   GFR Estimate >90   GFR Estimate If Black >90   Calcium 9.0   Anion Gap 6   Albumin 3.9   Protein Total 7.5   Bilirubin Total 3.1 (H)   Alkaline Phosphatase 73   ALT 58 (H)   AST 60 (H)   Ferritin 9,688 (H)   Glucose 81   WBC 14.2 (H)   Hemoglobin 8.3 (L)   Hematocrit 23.6 (L)   Platelet Count 290   RBC Count 2.64 (L)   MCV 89   MCH 31.4   MCHC 35.2   RDW 22.7 (H)   Diff Method Automated Method   % Neutrophils 71.6   % Lymphocytes 17.8   % Monocytes 7.1   % Eosinophils 1.7   % Basophils 1.0   % Immature Granulocytes 0.8   Nucleated RBCs 4 (H)   Absolute Neutrophil 10.1 (H)   Absolute Lymphocytes 2.5   Absolute Monocytes 1.0   Absolute Eosinophils 0.2   Absolute Basophils 0.1   Abs Immature Granulocytes 0.1   Absolute Nucleated RBC 0.5   % Retic 25.8 (H)   Absolute Retic 681.1 (H)      3/30/2020 14:43 5/12/2020 11:48   Ferritin 15,538 (H) 9,688 (H)       Imaging:   US of LLE dated 5/12/20  Impression:  No deep venous thrombosis in the left lower extremity.     Assessment and Plan:  Jennifer Cervantes is a 20 year  old female with HbSS. Her disease has been complicated by stroke leading to significant cognitive delay and right UE hemiparesis, high anxiety leading to frequent pain crises on top of chronic pain syndrome, poor social structure at home with no real support, and iron overload secondary to repeated transfusions.     1) HbSS and complications              -- Previously placed BMT referral. She does not feel she wants to do this but is open to hear more about it and meeting with the team. Julieta, RNCC, if helping to coordinate this    --She has been on q6 week transfusions (exchanged last 4/7). Had CVA ~2 yo and another TIA ~2014 during a trial off transfusions and just HU. Next Transfusion (+/- phlebotomy depending on what our nurses decide on access) TBD. Has been getting them about every 6 weeks. Dr. Duncan would like to push them to every 8 weeks with her iron overload   --Needs aggressive chelation. Jadenu now 1440 mg daily and well-tolerated per report. Ferritin significantly improved (15,538 --> 9,688). Continue Jadenu   --Continue HU 2000 mg daily    --Continue ASA post-stroke   --no current concern for infection. Continue to managed pain with PO medications. Did have infusion yesterday which was helpful    --Phone visits weekly alternating with Andrei Machado or Dr. Duncan. May benefit from crizanlizumab    2) LLE Pain  -called with LLE pain and edema. Obtained US yesterday which was negative for DVT. Edema resolved today per patient. Still painful though management. No symptoms of cellulitis, though unable to assess. Will continue to monitor. Should call if symptoms change   -continue OxyContin 10 mg BID and oxycodone IR 10 mg q6 hrs      Video-Visit Details    Type of service:  Video Visit    Video Start Time: 1:29 PM  Video End Time: 1:36 PM    Originating Location (pt. Location): Home    Distant Location (provider location):  Provider's Home     Platform used for Video Visit: Clarisa Suresh  HANNY

## 2020-05-13 NOTE — LETTER
"5/13/2020       RE: Jennifer Cervantes  [address]  [facility] [address]     Dear Colleague,    Thank you for referring your patient, Jennifer Cervantes, to the [facility]. Please see a copy of my visit note below.    Jennifer Cervantes is a 20 year old female who is being evaluated via a billable video visit.      The patient has been notified of following:     \"This video visit will be conducted via a call between you and your physician/provider. We have found that certain health care needs can be provided without the need for an in-person physical exam.  This service lets us provide the care you need with a video conversation.  If a prescription is necessary we can send it directly to your pharmacy.  If lab work is needed we can place an order for that and you can then stop by our lab to have the test done at a later time.    Video visits are billed at different rates depending on your insurance coverage.  Please reach out to your insurance provider with any questions.    If during the course of the call the physician/provider feels a video visit is not appropriate, you will not be charged for this service.\"    Patient has given verbal consent for Video visit? Yes    How would you like to obtain your AVS? [facility]    Patient would like the video invitation sent by: Text to cell phone: [phone]    Will anyone else be joining your video visit? No        Sickle Cell Clinic Follow Up Outpatient Visit    Date of encounter: May 13, 2020    Visit Notes    Jennifer Cervantes is a 20 year old female who is has a follow up outpatient hematology visit for Hb SS.    Jennifer's visit was done via video due to COVID19 restrictions.    She is an add on today for left lower leg pain and swelling.     Interval History  She started having LLE pain. Now she rate it 7.5/10. Was 8/10 yesterday.  Swelling improved/resolved now. No other swelling or redness. Oral meds are helping and she feels like she can manage with oral alone. " "She is taking oxycodone IR every 6 hours and Oxycontin BID. Heat doesn't help. Drinking lots of fluids. Some pain in back and sides, typical pain. No SOB, CP or cough. Denies f/c. No dysuria.     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 She also has significant anxiety and depression with a strong anxiety about transferring to the adult clinic. She usually has pain crises secondary to the anxiety. This \"fear of the unknown\" is significant enough that she wants to tour the inpatient floor before the transition or before she gets admitted there. [facility] recurrent pain crises and is actually out on pass now but is still admitted as of Friday 2/28. She has no real support from family at home and that, combined with her cognitive delays, make her care even more complex. She is having some back pain today which is a frequent location for her. She does say that her oral options do take an edge off. No fevers or cough, chest pain, dyspnea, bruising, or GI symptoms today. She has gotten a couple doses of Desferal for iron overload as she has been on chronic transfusions post-stroke for a long time. She is up to date on immunizations and got to meet with Daya Carter last week (also while on pass). It is unclear when [facility] will discharge [facility] but based on recent history, she thinks she will have a pain crisis soon after discharge.      Review of systems:  A complete 14 point review of systems was completed. All were negative except for what was reported in the HPI or highlighted here.    --------------------------------  Plan last " reviewed with patient: 5/8/2020    Patient background: 21yo F, enjoys movies and kids though there are times where she does not really want to talk to people. Does not have a lot of social support at home.     Sickle Cell Disease History  Primary Hematologist: Perla  Genotype: SS  Acute Pain Crisis Treatment:    ER/Acute Care/Infusion Clinic:   o Morphine 2 mg IVP/SC Q1H X 3 doses  o Toradol  o Maintenance IV fluids with D5 half-normal saline  o Other: Benadryl PO and Zofran 8 mg IV PRN itching or nausea    Inpatient:  o Opioid: Morphine 2 mg IV Q1H PRN until PCA starts  o PCA plan: (Consistent with recent [facility] pain plan)  - PCA button dose: Morphine 1 mg  - Lockout: 10 minutes  - Continuous Infusion: consider 1 mg/hr  - One hr max: 6 mg  o [facility] often had success weaning to OxyCONTIN 10 mg q12h with Morphine 0.7 mg IV q10 min PRN rather than continuous infusion  o Other Medications: Benadryl, Zofran  o If PCA not working, she Has had success with ketamine starting at 4mg/h and advancing only to 6mg/h, as 8mg/h made her feel quite poorly.  o If giving scheduled toradol, hold ASA (ok to give celebrex if she prefers)  o Supportive Care: Docusate, Senna  Chronic Pain Medications:    Home regimen as of 2/4/20 at [facility]: OxyCONTIN 10 mg po q 12 hrs and oxycodone 5-10 mg p.o. q.4-6 hours p.r.n. breakthrough pain.  She also continues on Celebrex, and Zoloft among other medications.    -Also benefits from mental health visits, acupuncture  Baseline Hemoglobin: 9.5 g/dL (on chronic transfusions)  Hydroxyurea use: Yes  H/O blood transfusions: Yes, several (iron overload)    H/O Transfusion Reactions: no    Antibodies:none  H/O Acute Chest Syndrome: Yes    Last episode: 10/2019     ICU/intubation: unsure  H/O Stroke: Yes (managed with chronic transfusions (has left her with neurodevelopmental deficits)  H/O VTE: no  H/O Cholecystectomy or Splenectomy: no  H/O Asthma, Pulm HTN, AVN, Leg Ulcers, Nephropathy,  Retinopathy, etc: Iron overload, asthma, chronic lung disease    EMERGENCY CARE PLAN  DO NOT TRANSFUSE WITHOUT DISCUSSING WITH HEMATOLOGY ATTENDING (available 24/7).       TRANSFUSION IS RISKY DUE TO RISK OF ALLOIMMUNIZATION AGAINST RBC ANTIGENS AND IRON OVERLOAD.  INDICATIONS FOR TRANSFUSION ARE ACUTE STROKE AND ACUTE CHEST SYNDROME (hypoxia plus infiltrate, not chest pain).  DO NOT TRANSFUSE FOR ANEMIA      -------------------------------------------    Sickle Cell Disease Comprehensive Checklist    Bone Health/Avascular Necrosis Screening/Symptoms (each visit): none today    Leg Ulcer evaluation (every visit): none    Hypertension (every visit): none    Last ophthalmologic exam: within last year (timing unsure)    Last urinalysis for microalbuminuria/CKD (annually): within last year    Last pulmonary evaluation (asthma, AMAN, pulm HTN): within last year    Stroke/silent cerebral infarct Hx (Y/N): Yes    Last PCP Visit: 2/2020    Vaccines:  o PCV13: 5/13/19  o Pneumovax (PPSV23): 3/04, 10/09, 7/12/19 (next due 7/2024)  o Menactra: 4/2010, 9/2015 (MCV due Sept 2020)  o Trumemba:  o Influenza: got 19-20 vaccine    Audiology (chelation): needed post-transition    Past Medical History:  Past Medical History:   Diagnosis Date     Anemia      Anxiety      Bleeding disorder (H)      Blood clotting disorder (H)      Cerebral infarction (H) 2015     Cognitive developmental delay     low IQ. 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 (H)     right hand contractures     Iron overload due to repeated red blood cell transfusions      Migraines      Oppositional defiant behavior      Uncomplicated asthma        Past Surgical History:  Past Surgical History:   Procedure Laterality Date     AS INSERT TUNNELED CV 2 CATH W/O PORT/PUMP       C BREAST REDUCTION (INCLUDES LIPO) TIER 3 Bilateral 04/23/2019      IR CVC NON TUNNEL PLACEMENT  4/7/2020     REPAIR TENDON ELBOW Right 10/2/2019    Procedure: Right Elbow Flexor Lengthening, Flexor Pronator Slide Of Wrist and Finger, Thumb Adductor Lengthening;  Surgeon: Anai Franco MD;  Location: [facility] OR     TONSILLECTOMY Bilateral 10/2/2019    Procedure: Bilateral Tonsillectomy;  Surgeon: Farhana Guy MD;  Location: [facility] OR       Family History:   Family History   Problem Relation Age of Onset     Sickle Cell Trait Mother      Sickle Cell Trait Father        Social History:  Social History     Socioeconomic History     Marital status: Single     Spouse name: Not on file     Number of children: Not on file     Years of education: Not on file     Highest education level: Not on file   Occupational History     Not on file   Social Needs     Financial resource strain: Not on file     Food insecurity     Worry: Not on file     Inability: Not on file     Transportation needs     Medical: Not on file     Non-medical: Not on file   Tobacco Use     Smoking status: Never Smoker     Smokeless tobacco: Never Used   Substance and Sexual Activity     Alcohol use: Not Currently     Alcohol/week: 0.0 standard drinks     Drug use: Never     Sexual activity: Not Currently     Partners: Male     Birth control/protection: Other   Lifestyle     Physical activity     Days per week: Not on file     Minutes per session: Not on file     Stress: Not on file   Relationships     Social connections     Talks on phone: Not on file     Gets together: Not on file     Attends Taoism service: Not on file     Active member of club or organization: Not on file     Attends meetings of clubs or organizations: Not on file     Relationship status: Not on file     Intimate partner violence     Fear of current or ex partner: Not on file     Emotionally abused: Not on file     Physically abused: Not on file     Forced sexual activity: Not on file   Other Topics Concern     Parent/sibling w/  "CABG, MI or angioplasty before 65F 55M? Not Asked   Social History Narrative    Lives with mom and extended family but \"toxic environment\" per her report. She would like to move out but cannot financially do so. She has minimal support at home despite her significant SCD comorbidities and cognitive delay from stroke.       Medications:  Current Outpatient Medications   Medication     acetaminophen (TYLENOL) 325 MG tablet     albuterol (PROVENTIL) (2.5 MG/3ML) 0.083% neb solution     aspirin (ASA) 81 MG tablet     aspirin (ASPIRIN) 81 MG EC tablet     Budesonide-Formoterol Fumarate (SYMBICORT IN)     celecoxib (CELEBREX) 100 MG capsule     diphenhydrAMINE (BENADRYL) 25 MG capsule     EPINEPHrine (EPIPEN) 0.3 MG/0.3ML injection     erythromycin ethylsuccinate (E.E.S.) 400 MG tablet     gabapentin (NEURONTIN) 300 MG capsule     GNP SENNA-LAX 8.6 MG tablet     hydroxyurea (HYDREA) 500 MG capsule     JADENU 360 MG tablet     medroxyPROGESTERone (PROVERA) 2.5 MG tablet     naloxone (NARCAN) 4 MG/0.1ML nasal spray     omeprazole (PRILOSEC) 40 MG DR capsule     ondansetron (ZOFRAN) 8 MG tablet     oxyCODONE IR (ROXICODONE) 10 MG tablet     OXYCONTIN 10 MG 12 hr tablet     pregabalin (LYRICA) 75 MG capsule     sertraline (ZOLOFT) 100 MG tablet     [START ON 5/14/2020] medroxyPROGESTERone (DEPO-PROVERA) 150 MG/ML IM injection     No current facility-administered medications for this visit.          Physical Exam:   No vitals performed as this is a video visit.     GENERAL: healthy, alert and no distress, Tired, non toxic appearing   EYES: Eyes grossly normal to inspection, conjunctivae and sclerae normal  HENT: normocephalic/atraumatic.  External ears, nose and mouth without ulcers or lesions.  NECK/CHEST:No erythema or drainage noted on neck.  RESP: no audible wheeze, cough, or visible cyanosis.  No visible retractions or increased work of breathing.  Able to speak fully in complete sentences.  NEURO: Cranial nerves grossly " intact, mentation intact and speech normal  PSYCH: mentation appears normal, affect normal/bright, judgement and insight intact, normal speech and appearance well-groomed  SKIN: Normal color for ethnicity. No notable pallor or icterus.    Labs:      5/12/2020 11:48   Sodium 139   Potassium 3.8   Chloride 109   Carbon Dioxide 25   Urea Nitrogen 7   Creatinine 0.46 (L)   GFR Estimate >90   GFR Estimate If Black >90   Calcium 9.0   Anion Gap 6   Albumin 3.9   Protein Total 7.5   Bilirubin Total 3.1 (H)   Alkaline Phosphatase 73   ALT 58 (H)   AST 60 (H)   Ferritin 9,688 (H)   Glucose 81   WBC 14.2 (H)   Hemoglobin 8.3 (L)   Hematocrit 23.6 (L)   Platelet Count 290   RBC Count 2.64 (L)   MCV 89   MCH 31.4   MCHC 35.2   RDW 22.7 (H)   Diff Method Automated Method   % Neutrophils 71.6   % Lymphocytes 17.8   % Monocytes 7.1   % Eosinophils 1.7   % Basophils 1.0   % Immature Granulocytes 0.8   Nucleated RBCs 4 (H)   Absolute Neutrophil 10.1 (H)   Absolute Lymphocytes 2.5   Absolute Monocytes 1.0   Absolute Eosinophils 0.2   Absolute Basophils 0.1   Abs Immature Granulocytes 0.1   Absolute Nucleated RBC 0.5   % Retic 25.8 (H)   Absolute Retic 681.1 (H)      3/30/2020 14:43 5/12/2020 11:48   Ferritin 15,538 (H) 9,688 (H)       Imaging:   US of LLE dated 5/12/20  Impression:  No deep venous thrombosis in the left lower extremity.     Assessment and Plan:  Jennifer Cervantes is a 20 year old female with HbSS. Her disease has been complicated by stroke leading to significant cognitive delay and right UE hemiparesis, high anxiety leading to frequent pain crises on top of chronic pain syndrome, poor social structure at home with no real support, and iron overload secondary to repeated transfusions.     1) HbSS and complications              -- Previously placed BMT referral. She does not feel she wants to do this but is open to hear more about it and meeting with the team. Julieta, RNCC, if helping to coordinate this    --She has been on  q6 week transfusions (exchanged last 4/7). Had CVA ~1 yo and another TIA ~2014 during a trial off transfusions and just HU. Next Transfusion (+/- phlebotomy depending on what our nurses decide on access) TBD. Has been getting them about every 6 weeks. Dr. Duncan would like to push them to every 8 weeks with her iron overload   --Needs aggressive chelation. Jadenu now 1440 mg daily and well-tolerated per report. Ferritin significantly improved (15,538 --> 9,688). Continue Jadenu   --Continue HU 2000 mg daily    --Continue ASA post-stroke   --no current concern for infection. Continue to managed pain with PO medications. Did have infusion yesterday which was helpful    --Phone visits weekly alternating with Andrei Machado or Dr. Duncan. May benefit from crizanlizumab    2) LLE Pain  -called with LLE pain and edema. Obtained US yesterday which was negative for DVT. Edema resolved today per patient. Still painful though management. No symptoms of cellulitis, though unable to assess. Will continue to monitor. Should call if symptoms change   -continue OxyContin 10 mg BID and oxycodone IR 10 mg q6 hrs      Video-Visit Details    Type of service:  Video Visit    Video Start Time: 1:29 PM  Video End Time: 1:36 PM    Originating Location (pt. Location): Home    Distant Location (provider location):  Provider's Home     Platform used for Video Visit: Clarisa Suresh PA-C          Again, thank you for allowing me to participate in the care of your patient.      Sincerely,    Cornelia Suresh PA-C

## 2020-05-14 ENCOUNTER — PATIENT OUTREACH (OUTPATIENT)
Dept: ONCOLOGY | Facility: CLINIC | Age: 20
End: 2020-05-14

## 2020-05-14 NOTE — PROGRESS NOTES
Writer called apheresis team to follow up on previous referral for transfusion medicine OP placed on 4/27. Writer was told that a call would be placed to Jennifer today to get scheduled for consult. Writer provided direct line for any issues, concerns, or additional help in coordination of Jennifer's care.

## 2020-05-15 ENCOUNTER — PATIENT OUTREACH (OUTPATIENT)
Dept: ONCOLOGY | Facility: CLINIC | Age: 20
End: 2020-05-15

## 2020-05-15 DIAGNOSIS — D57.00 SICKLE CELL CRISIS (H): ICD-10-CM

## 2020-05-15 DIAGNOSIS — D57.1 HB-SS DISEASE WITHOUT CRISIS (H): ICD-10-CM

## 2020-05-15 DIAGNOSIS — D57.1 HB-SS DISEASE WITHOUT CRISIS (H): Primary | ICD-10-CM

## 2020-05-15 RX ORDER — OXYCODONE HYDROCHLORIDE 10 MG/1
10 TABLET ORAL EVERY 6 HOURS PRN
Qty: 30 TABLET | Refills: 0 | Status: SHIPPED | OUTPATIENT
Start: 2020-05-15 | End: 2020-05-29

## 2020-05-21 VITALS
SYSTOLIC BLOOD PRESSURE: 131 MMHG | RESPIRATION RATE: 16 BRPM | BODY MASS INDEX: 26.25 KG/M2 | WEIGHT: 152.9 LBS | OXYGEN SATURATION: 90 % | HEART RATE: 94 BPM | DIASTOLIC BLOOD PRESSURE: 78 MMHG | TEMPERATURE: 98.1 F

## 2020-05-21 DIAGNOSIS — D57.1 HB-SS DISEASE WITHOUT CRISIS (H): ICD-10-CM

## 2020-05-21 LAB
ALBUMIN SERPL-MCNC: 4.1 G/DL (ref 3.4–5)
ALP SERPL-CCNC: 73 U/L (ref 40–150)
ALT SERPL W P-5'-P-CCNC: 60 U/L (ref 0–50)
ANION GAP SERPL CALCULATED.3IONS-SCNC: 7 MMOL/L (ref 3–14)
ANISOCYTOSIS BLD QL SMEAR: ABNORMAL
AST SERPL W P-5'-P-CCNC: 66 U/L (ref 0–45)
BASOPHILS # BLD AUTO: 0 10E9/L (ref 0–0.2)
BASOPHILS NFR BLD AUTO: 0 %
BILIRUB SERPL-MCNC: 3.7 MG/DL (ref 0.2–1.3)
BUN SERPL-MCNC: 4 MG/DL (ref 7–30)
CALCIUM SERPL-MCNC: 8.9 MG/DL (ref 8.5–10.1)
CHLORIDE SERPL-SCNC: 110 MMOL/L (ref 94–109)
CO2 SERPL-SCNC: 21 MMOL/L (ref 20–32)
CREAT SERPL-MCNC: 0.59 MG/DL (ref 0.52–1.04)
DIFFERENTIAL METHOD BLD: ABNORMAL
EOSINOPHIL # BLD AUTO: 0.3 10E9/L (ref 0–0.7)
EOSINOPHIL NFR BLD AUTO: 2.7 %
ERYTHROCYTE [DISTWIDTH] IN BLOOD BY AUTOMATED COUNT: 23.3 % (ref 10–15)
FERRITIN SERPL-MCNC: ABNORMAL NG/ML (ref 12–150)
GFR SERPL CREATININE-BSD FRML MDRD: >90 ML/MIN/{1.73_M2}
GLUCOSE SERPL-MCNC: 87 MG/DL (ref 70–99)
HCT VFR BLD AUTO: 22.8 % (ref 35–47)
HGB BLD-MCNC: 7.8 G/DL (ref 11.7–15.7)
LYMPHOCYTES # BLD AUTO: 1.5 10E9/L (ref 0.8–5.3)
LYMPHOCYTES NFR BLD AUTO: 14.3 %
MCH RBC QN AUTO: 31.2 PG (ref 26.5–33)
MCHC RBC AUTO-ENTMCNC: 34.2 G/DL (ref 31.5–36.5)
MCV RBC AUTO: 91 FL (ref 78–100)
MONOCYTES # BLD AUTO: 0.6 10E9/L (ref 0–1.3)
MONOCYTES NFR BLD AUTO: 5.3 %
MYELOCYTES # BLD: 0.2 10E9/L
MYELOCYTES NFR BLD MANUAL: 1.8 %
NEUTROPHILS # BLD AUTO: 8.1 10E9/L (ref 1.6–8.3)
NEUTROPHILS NFR BLD AUTO: 75.9 %
NRBC # BLD AUTO: 0.9 10*3/UL
NRBC BLD AUTO-RTO: 8 /100
PLATELET # BLD AUTO: 293 10E9/L (ref 150–450)
PLATELET # BLD EST: ABNORMAL 10*3/UL
POIKILOCYTOSIS BLD QL SMEAR: ABNORMAL
POLYCHROMASIA BLD QL SMEAR: ABNORMAL
POTASSIUM SERPL-SCNC: 3.8 MMOL/L (ref 3.4–5.3)
PROT SERPL-MCNC: 7.7 G/DL (ref 6.8–8.8)
RBC # BLD AUTO: 2.5 10E12/L (ref 3.8–5.2)
RETICS # AUTO: ABNORMAL 10E9/L (ref 25–95)
RETICS/RBC NFR AUTO: 29 % (ref 0.5–2)
SICKLE CELLS BLD QL SMEAR: ABNORMAL
SODIUM SERPL-SCNC: 138 MMOL/L (ref 133–144)
TARGETS BLD QL SMEAR: ABNORMAL
WBC # BLD AUTO: 10.7 10E9/L (ref 4–11)

## 2020-05-21 PROCEDURE — 36415 COLL VENOUS BLD VENIPUNCTURE: CPT | Performed by: PEDIATRICS

## 2020-05-21 PROCEDURE — 25000128 H RX IP 250 OP 636: Performed by: PEDIATRICS

## 2020-05-21 PROCEDURE — 82728 ASSAY OF FERRITIN: CPT | Performed by: PEDIATRICS

## 2020-05-21 PROCEDURE — 86902 BLOOD TYPE ANTIGEN DONOR EA: CPT | Performed by: PEDIATRICS

## 2020-05-21 PROCEDURE — 80053 COMPREHEN METABOLIC PANEL: CPT | Performed by: PEDIATRICS

## 2020-05-21 PROCEDURE — 86923 COMPATIBILITY TEST ELECTRIC: CPT | Performed by: PEDIATRICS

## 2020-05-21 PROCEDURE — 86850 RBC ANTIBODY SCREEN: CPT | Performed by: PEDIATRICS

## 2020-05-21 PROCEDURE — 86901 BLOOD TYPING SEROLOGIC RH(D): CPT | Performed by: PEDIATRICS

## 2020-05-21 PROCEDURE — 86900 BLOOD TYPING SEROLOGIC ABO: CPT | Performed by: PEDIATRICS

## 2020-05-21 PROCEDURE — 85025 COMPLETE CBC W/AUTO DIFF WBC: CPT | Performed by: PEDIATRICS

## 2020-05-21 PROCEDURE — 85045 AUTOMATED RETICULOCYTE COUNT: CPT | Performed by: PEDIATRICS

## 2020-05-21 RX ORDER — HEPARIN SODIUM (PORCINE) LOCK FLUSH IV SOLN 100 UNIT/ML 100 UNIT/ML
5 SOLUTION INTRAVENOUS
Status: CANCELLED | OUTPATIENT
Start: 2020-05-21

## 2020-05-21 RX ORDER — HEPARIN SODIUM,PORCINE 10 UNIT/ML
5 VIAL (ML) INTRAVENOUS
Status: CANCELLED | OUTPATIENT
Start: 2020-05-21

## 2020-05-21 RX ORDER — HEPARIN SODIUM (PORCINE) LOCK FLUSH IV SOLN 100 UNIT/ML 100 UNIT/ML
5 SOLUTION INTRAVENOUS ONCE
Status: COMPLETED | OUTPATIENT
Start: 2020-05-21 | End: 2020-05-21

## 2020-05-21 RX ADMIN — HEPARIN SODIUM (PORCINE) LOCK FLUSH IV SOLN 100 UNIT/ML 5 ML: 100 SOLUTION at 11:22

## 2020-05-21 ASSESSMENT — PAIN SCALES - GENERAL: PAINLEVEL: NO PAIN (0)

## 2020-05-21 NOTE — NURSING NOTE
Chief Complaint   Patient presents with     Port Draw     Labs drawn via port by RN in lab. VS taken.         Port accessed with 20g flat needle by RN, labs collected, line flushed with saline and heparin.  Vitals taken.     Shantell ARMIJO RN PHN BSN  BMT/Oncology Lab

## 2020-05-22 ENCOUNTER — ONCOLOGY VISIT (OUTPATIENT)
Dept: ONCOLOGY | Facility: CLINIC | Age: 20
End: 2020-05-22
Attending: PEDIATRICS
Payer: COMMERCIAL

## 2020-05-22 ENCOUNTER — PATIENT OUTREACH (OUTPATIENT)
Dept: ONCOLOGY | Facility: CLINIC | Age: 20
End: 2020-05-22

## 2020-05-22 VITALS
BODY MASS INDEX: 26.49 KG/M2 | SYSTOLIC BLOOD PRESSURE: 117 MMHG | OXYGEN SATURATION: 98 % | HEART RATE: 76 BPM | WEIGHT: 154.3 LBS | RESPIRATION RATE: 16 BRPM | TEMPERATURE: 98.4 F | DIASTOLIC BLOOD PRESSURE: 73 MMHG

## 2020-05-22 DIAGNOSIS — F41.9 ANXIETY: ICD-10-CM

## 2020-05-22 DIAGNOSIS — G89.4 CHRONIC PAIN SYNDROME: Primary | ICD-10-CM

## 2020-05-22 DIAGNOSIS — D57.1 HB-SS DISEASE WITHOUT CRISIS (H): ICD-10-CM

## 2020-05-22 DIAGNOSIS — D57.1 HB-SS DISEASE WITHOUT CRISIS (H): Primary | ICD-10-CM

## 2020-05-22 DIAGNOSIS — Z86.73 HISTORY OF STROKE: ICD-10-CM

## 2020-05-22 LAB
ABO + RH BLD: NORMAL
ABO + RH BLD: NORMAL
BLD GP AB SCN SERPL QL: NORMAL
BLD PROD TYP BPU: NORMAL
BLD UNIT ID BPU: 0
BLD UNIT ID BPU: 0
BLOOD BANK CMNT PATIENT-IMP: NORMAL
BLOOD PRODUCT CODE: NORMAL
BLOOD PRODUCT CODE: NORMAL
BPU ID: NORMAL
BPU ID: NORMAL
NUM BPU REQUESTED: 2
SPECIMEN EXP DATE BLD: NORMAL
TRANSFUSION STATUS PATIENT QL: NORMAL

## 2020-05-22 PROCEDURE — 36430 TRANSFUSION BLD/BLD COMPNT: CPT

## 2020-05-22 PROCEDURE — P9016 RBC LEUKOCYTES REDUCED: HCPCS | Performed by: PEDIATRICS

## 2020-05-22 PROCEDURE — 25000128 H RX IP 250 OP 636: Mod: ZF | Performed by: PEDIATRICS

## 2020-05-22 RX ORDER — OMEPRAZOLE 40 MG/1
40 CAPSULE, DELAYED RELEASE ORAL DAILY
Qty: 30 CAPSULE | Refills: 3 | Status: SHIPPED | OUTPATIENT
Start: 2020-05-22 | End: 2020-08-17

## 2020-05-22 RX ORDER — HEPARIN SODIUM (PORCINE) LOCK FLUSH IV SOLN 100 UNIT/ML 100 UNIT/ML
5 SOLUTION INTRAVENOUS
Status: DISCONTINUED | OUTPATIENT
Start: 2020-05-22 | End: 2020-05-22 | Stop reason: HOSPADM

## 2020-05-22 RX ORDER — GABAPENTIN 300 MG/1
900 CAPSULE ORAL 3 TIMES DAILY
Qty: 90 CAPSULE | Refills: 1 | Status: SHIPPED | OUTPATIENT
Start: 2020-05-22 | End: 2020-08-17

## 2020-05-22 RX ORDER — SERTRALINE HYDROCHLORIDE 100 MG/1
150 TABLET, FILM COATED ORAL DAILY
Qty: 30 TABLET | Refills: 1 | Status: SHIPPED | OUTPATIENT
Start: 2020-05-22 | End: 2020-06-19

## 2020-05-22 RX ORDER — IBUPROFEN 200 MG
600 TABLET ORAL EVERY 6 HOURS PRN
Qty: 90 TABLET | Refills: 3 | Status: SHIPPED | OUTPATIENT
Start: 2020-05-22 | End: 2020-11-20

## 2020-05-22 RX ADMIN — Medication 5 ML: at 15:15

## 2020-05-22 NOTE — PROGRESS NOTES
Writer placed call to orthotics central scheduling to inquire about referral placed on 3/18/2020. Writer left VM with direct line for call back.    Writer then placed call to Jennifer to close the loop and update her on information. Patient prefers for writer to handle the appointment set up, writer agreed. Patient states that overall she is feeling well though the central line placed in her neck has left her with residual pain that she will be using methods discussed in her appointment with Dr Duncan today. Patient also updated that she is hopeful to be in her own housing soon, her  has been making progress towards this. Writer requested to be kept in the loop and patient will advise once finalized home plans are made. No other questions or concerns at this time.

## 2020-05-22 NOTE — PROGRESS NOTES
Infusion Nursing Note:  Jennifer Cervantes presents today for 2 units PRBC.    Patient seen by provider today: Yes: David Duncan MD   present during visit today: Not Applicable.    Note: Patient endorse.s fatigue accompanied with generalized weakness. Denies heart palpitations, dizziness and shortness of breath. Denies nausea/vomiting nor chest and abdominal discomfort. No new concerns made. Otherwise well.    Patient states that last April as inpatient she had couple episode of chills when receiving blood. Patient described it as uncontrollable, denies shortness of breath nor chest tight ness. States that she was not given any medication nor stopped the transfusion. Patient refused premeds prior to blood transfusion today.     Appointment is not made yet by the time patient left the facility. Instructed patient if unbale to see in next few days to call . Verbalized understanding.     Intravenous Access:  Implanted Port.    Treatment Conditions:  Lab Results   Component Value Date    HGB 7.8 05/21/2020     Lab Results   Component Value Date    WBC 10.7 05/21/2020      Lab Results   Component Value Date    ANEU 8.1 05/21/2020     Lab Results   Component Value Date     05/21/2020      Lab Results   Component Value Date     05/21/2020                   Lab Results   Component Value Date    POTASSIUM 3.8 05/21/2020           No results found for: MAG         Lab Results   Component Value Date    CR 0.59 05/21/2020                   Lab Results   Component Value Date    MICAH 8.9 05/21/2020                Lab Results   Component Value Date    BILITOTAL 3.7 05/21/2020           Lab Results   Component Value Date    ALBUMIN 4.1 05/21/2020                    Lab Results   Component Value Date    ALT 60 05/21/2020           Lab Results   Component Value Date    AST 66 05/21/2020       Results reviewed, labs MET treatment parameters, ok to proceed with treatment.  Blood transfusion consent signed  5/22/20.    TORB: 5/22/20/1130h/ David Duncan MD/Dione Delarosa RN/ No need for premeds prior to transfusion. Will monitor for now and update me if anything.       Post Infusion Assessment:  Patient tolerated infusion without incident.  Blood return noted pre and post infusion.  Site patent and intact, free from redness, edema or discomfort.  No evidence of extravasations.  Access discontinued per protocol.       Discharge Plan:   Patient declined prescription refills.  Discharge instructions reviewed with: Patient.  Patient and/or family verbalized understanding of discharge instructions and all questions answered.  AVS to patient via [employer].  Patient will call to return for next appointment.   Patient discharged in stable condition accompanied by: self.  Departure Mode: Ambulatory.  Face to Face time: 7.    GLORIA YANCEY RN

## 2020-05-22 NOTE — LETTER
"    5/22/2020         RE: Jennifer Cervantes  [address] [place]  [facility] [address]        Dear Colleague,    Thank you for referring your patient, Jennifer Cervantes, to the [facility]. Please see a copy of my visit note below.      Sickle Cell Clinic Follow Up Outpatient Visit    Date of encounter: 5/22/2020      Visit Notes    Jennifer Cervantes is a 20 year old female who is has a follow up outpatient hematology visit for Hb SS.    Apolinar was seen in infusion    Interval History  Jennifer continues to do well overall. She has occasional pain and needed some clarity on her Rx (halved the Rx because the dose was doubled) but that was clearer now. She still has the pain on her neck but IcyHot was not super helpful. No cough or fever recently. She is enjoying the warmer weather but needs to get a new leg orthotic because of how large and bulky hers is. She is open to meeting with Dr. Leonard with BMT though she isn't convinced she would move forward. No recent illnesses or sick contacts.       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 She also has significant anxiety and depression with a strong anxiety about transferring to the adult clinic. She usually has pain crises secondary to the anxiety. This \"fear of the unknown\" is significant enough that she wants to tour the inpatient floor before the transition or before she gets admitted there. [facility] with recurrent pain crises and is actually out on pass now but is still admitted as of Friday 2/28. She has no real support from family at home and that, combined with her cognitive delays, " make her care even more complex. She is having some back pain today which is a frequent location for her. She does say that her oral options do take an edge off. No fevers or cough, chest pain, dyspnea, bruising, or GI symptoms today. She has gotten a couple doses of Desferal for iron overload as she has been on chronic transfusions post-stroke for a long time. She is up to date on immunizations and got to meet with Daya Carter last week (also while on pass). It is unclear when she will discharge from [facility] but based on recent history, she thinks she will have a pain crisis soon after discharge.    Key results prior to referral:  MR ABDOMEN W/O CONTRAST ([facility]), 10/3/2017  (unlikely to be most recent one)     Comparison: 12/28/2015   Clinical history: Sickle cell disease   Findings:     Average liver iron concentration:  28.6 mg/g dry tissue, previously 22.8 mg/g dry tissue (NR: 0.17-1.8)  513 mmol/kg dry tissue, previously 400.8 mmol/kg dry tissue (NR: 3-33)     There is also iron deposition in the spleen, which is enlarged.  Prominent vessels in the left upper quadrant, which may be related to varices, are unchanged from the comparison examinations.                                                            Impression:  1. Increase in elevated liver concentration.  2. Splenomegaly. Question portal hypertension.     WAYNE CURTIS MD    11/4/19  MRI Pelvis  Summary:  Marrow changes consistent with SCD but no convincing evidence of AVN at this time.     1/28/20 MRI/MRA  Comparison 5/8/15  Summary:  Stable appearance of the brain and cerebral vasculature compared to 5/8/2015, including remote left MCA teritory infarct. No evidence of new infarct or new abnormality on MRA    3/10/2020 Cardiac MRI      Review of systems:  A complete 14 point review of systems was completed. All were negative except for what was reported in the HPI or highlighted here.    --------------------------------  Plan last reviewed  with patient: 5/22/2020    Patient background: 21yo F, enjoys movies and kids though there are times where she does not really want to talk to people. Does not have a lot of social support at home.     Sickle Cell Disease History  Primary Hematologist: Perla  PCP: Raul  Genotype: SS  Acute Pain Crisis Treatment:    ER/Acute Care/Infusion Clinic:   o Morphine 2 mg IVP/SC Q1H X 3 doses  o Toradol  o Maintenance IV fluids with D5 half-normal saline  o Other: Benadryl PO and Zofran 8 mg IV PRN itching or nausea    Inpatient:  o Opioid: Morphine 2 mg IV Q1H PRN until PCA starts  o PCA plan:   - PCA button dose: Morphine 1 mg  - Lockout: 10 minutes  - Continuous Infusion: consider 1 mg/hr  - One hr max: 6 mg  o Try to wean to OxyCONTIN 10 mg q12h with Morphine 0.7 mg IV q10 min PRN rather than continuous infusion  o Other Medications: Benadryl, Zofran  o If PCA not working, she Has had success with ketamine starting at 4mg/h and advancing only to 6mg/h, as 8mg/h made her feel quite poorly.  o If giving scheduled toradol, hold ASA (ok to give celebrex if she prefers)  o Supportive Care: Docusate, Senna  Chronic Pain Medications:    Home regimen  OxyCONTIN 10 mg po q 12 hrs and oxycodone 10 mg p.o. q.6 hours p.r.n. breakthrough pain #60/month.  She also continues on Celebrex, and Zoloft among other medications.    -Also benefits from mental health visits, acupuncture  Baseline Hemoglobin: 9.5 g/dL (on chronic transfusions)  Hydroxyurea use: Yes  H/O blood transfusions: Yes, several (iron overload) Most recent 5/22/20    H/O Transfusion Reactions: no    Antibodies:none  H/O Acute Chest Syndrome: Yes    Last episode: 10/2019     ICU/intubation: unsure  H/O Stroke: Yes (managed with chronic transfusions (has left her with neurodevelopmental deficits)  H/O VTE: no  H/O Cholecystectomy or Splenectomy: no  H/O Asthma, Pulm HTN, AVN, Leg Ulcers, Nephropathy, Retinopathy, etc: Iron overload, asthma, chronic lung  disease    EMERGENCY CARE PLAN  DO NOT TRANSFUSE WITHOUT DISCUSSING WITH HEMATOLOGY ATTENDING (available 24/7).       TRANSFUSION IS RISKY DUE TO RISK OF ALLOIMMUNIZATION AGAINST RBC ANTIGENS AND IRON OVERLOAD.  INDICATIONS FOR TRANSFUSION ARE ACUTE STROKE AND ACUTE CHEST SYNDROME (hypoxia plus infiltrate, not chest pain).  DO NOT TRANSFUSE FOR ANEMIA      -------------------------------------------    Sickle Cell Disease Comprehensive Checklist    Bone Health/Avascular Necrosis Screening/Symptoms (each visit): none today    Leg Ulcer evaluation (every visit): none    Hypertension (every visit): none    Last ophthalmologic exam: within last year (timing unsure)    Last urinalysis for microalbuminuria/CKD (annually): within last year    Last pulmonary evaluation (asthma, AMAN, pulm HTN): within last year    Stroke/silent cerebral infarct Hx (Y/N): Yes    Last PCP Visit: 2/2020    Vaccines:  o PCV13: 5/13/19  o Pneumovax (PPSV23): 3/04, 10/09, 7/12/19 (next due 7/2024)  o Menactra: 4/2010, 9/2015 (MCV due Sept 2020)  o Trumemba:  o Influenza: got 19-20 vaccine    Audiology (chelation): needed post-transition    Past Medical History:  Past Medical History:   Diagnosis Date     Anemia      Anxiety      Bleeding disorder (H)      Blood clotting disorder (H)      Cerebral infarction (H) 2015     Cognitive developmental delay     low IQ. 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 (H)     right hand contractures     Iron overload due to repeated red blood cell transfusions      Migraines      Oppositional defiant behavior      Uncomplicated asthma        Past Surgical History:  Past Surgical History:   Procedure Laterality Date     AS INSERT TUNNELED CV 2 CATH W/O PORT/PUMP       C BREAST REDUCTION (INCLUDES LIPO) TIER 3 Bilateral 04/23/2019     IR CVC NON TUNNEL PLACEMENT  4/7/2020     REPAIR  TENDON ELBOW Right 10/2/2019    Procedure: Right Elbow Flexor Lengthening, Flexor Pronator Slide Of Wrist and Finger, Thumb Adductor Lengthening;  Surgeon: Anai Franco MD;  Location: [facility] OR     TONSILLECTOMY Bilateral 10/2/2019    Procedure: Bilateral Tonsillectomy;  Surgeon: Farhana Guy MD;  Location: [facility] OR       Family History:   Family History   Problem Relation Age of Onset     Sickle Cell Trait Mother      Sickle Cell Trait Father        Social History:  Social History     Socioeconomic History     Marital status: Single     Spouse name: Not on file     Number of children: Not on file     Years of education: Not on file     Highest education level: Not on file   Occupational History     Not on file   Social Needs     Financial resource strain: Not on file     Food insecurity     Worry: Not on file     Inability: Not on file     Transportation needs     Medical: Not on file     Non-medical: Not on file   Tobacco Use     Smoking status: Never Smoker     Smokeless tobacco: Never Used   Substance and Sexual Activity     Alcohol use: Not Currently     Alcohol/week: 0.0 standard drinks     Drug use: Never     Sexual activity: Not Currently     Partners: Male     Birth control/protection: Other   Lifestyle     Physical activity     Days per week: Not on file     Minutes per session: Not on file     Stress: Not on file   Relationships     Social connections     Talks on phone: Not on file     Gets together: Not on file     Attends Yazidism service: Not on file     Active member of club or organization: Not on file     Attends meetings of clubs or organizations: Not on file     Relationship status: Not on file     Intimate partner violence     Fear of current or ex partner: Not on file     Emotionally abused: Not on file     Physically abused: Not on file     Forced sexual activity: Not on file   Other Topics Concern     Parent/sibling w/ CABG, MI or angioplasty before 65F 55M? Not Asked  "  Social History Narrative    Lives with mom and extended family but \"toxic environment\" per her report. She would like to move out but cannot financially do so. She has minimal support at home despite her significant SCD comorbidities and cognitive delay from stroke.       Medications:  Current Outpatient Medications   Medication     acetaminophen (TYLENOL) 325 MG tablet     albuterol (PROVENTIL) (2.5 MG/3ML) 0.083% neb solution     aspirin (ASA) 81 MG tablet     aspirin (ASPIRIN) 81 MG EC tablet     Budesonide-Formoterol Fumarate (SYMBICORT IN)     celecoxib (CELEBREX) 100 MG capsule     diphenhydrAMINE (BENADRYL) 25 MG capsule     EPINEPHrine (EPIPEN) 0.3 MG/0.3ML injection     erythromycin ethylsuccinate (E.E.S.) 400 MG tablet     gabapentin (NEURONTIN) 300 MG capsule     GNP SENNA-LAX 8.6 MG tablet     hydroxyurea (HYDREA) 500 MG capsule     JADENU 360 MG tablet     medroxyPROGESTERone (DEPO-PROVERA) 150 MG/ML IM injection     medroxyPROGESTERone (PROVERA) 2.5 MG tablet     naloxone (NARCAN) 4 MG/0.1ML nasal spray     omeprazole (PRILOSEC) 40 MG DR capsule     ondansetron (ZOFRAN) 8 MG tablet     oxyCODONE IR (ROXICODONE) 10 MG tablet     OXYCONTIN 10 MG 12 hr tablet     pregabalin (LYRICA) 75 MG capsule     sertraline (ZOLOFT) 100 MG tablet     No current facility-administered medications for this visit.      Facility-Administered Medications Ordered in Other Visits   Medication     heparin 100 UNIT/ML injection 5 mL     sodium chloride (PF) 0.9% PF flush 3-20 mL         Physical Exam:   T98.1, RR 16, HR 81, /74, O2 97% on RA   GENERAL: healthy, alert and no distress. In infusion area. Reserved at first but opened up  EYES: Eyes grossly normal to inspection, conjunctivae normal, trace icterus  HENT: normocephalic/atraumatic.  External ears, nose and mouth without ulcers or lesions. Wearing a bandana  NECK/CHEST:No erythema or drainage noted on neck. Still complaining of discomfort but no overlying skin " changes. Well healed.  CV: RRR, no murmurs, strong peripheral pulses  RESP: good air movement in all lung fields. no audible wheeze, cough, or visible cyanosis.  No visible retractions or increased work of breathing.  Able to speak fully in complete sentences.  NEURO: Cranial nerves grossly intact, mentation intact and speech normal, R UE contractures noted, unchanged. Did not observe her walk today but she has a chronic limp  PSYCH: mentation appears normal, affect normal/bright, judgement and insight intact, normal speech and appearance well-groomed  SKIN: Normal color for ethnicity. No notable pallor or icterus.    Imaging: none today    Assessment:  Jennifer Cervantes is a 20 year old female with HbSS. Her disease has been complicated by stroke leading to significant cognitive delay and right UE hemiparesis, high anxiety leading to frequent pain crises on top of chronic pain syndrome, poor social structure at home with no real support, and iron overload secondary to repeated transfusions. She seems to be doing well at today's infusion.     We discussed BMT again and she is open to meeting with Dr. Leonard, as the indications have evolved a bit from the last time she said she was told about it in her pediatric care, sometime 4-5 years ago. Her stroke history, iron overload, and recurrent pain issues make her disease phenotype quite severe. Her iron overload is outpacing her chelation and I am worried that we will always be chasing without thinking more broadly about our approach, including curative therapies like BMT. She has not really wanted to do it before but it's been a long time and she is willing to think about it again.    Plan:  1) HbSS and complications   -- Pain plan reviewed and updated.    Recommended trying 400-600 ibuprofen when pain is moderate to fill in gaps from oxycodone. Tylenol is okay for mild   pain but may not directly affect SCD pathophysiology in the same way as ibuprofen               --Discussed BMT again. Discussed with Dr. Leonard.   --She has been on q6 week transfusions (exchanged 4/7). Had CVA ~1 yo and another TIA ~2014 during a trial off transfusions and just HU. My goal is to ultimately stop the transfusions given her significant iron overload but she will take time to become comfortable with this. Next Transfusion (+/- phlebotomy depending on what our nurses decide on access) July 3 or 17.   --Needs aggressive chelation. Jadenu now 1440 mg daily and well-tolerated per report. Also need to have audiology appt when COVID restrictions zaid.   --Continue ASA post-stroke  2) Labs: CBC, CMP, ferritin   3) Medication Changes: Oxycodone, Celebrex, HU refilled.  4)  Other orders/recommendations: Will try to continue spreading out transfusions over time until we can stop.  5)  HCM: PCP is Daya Carter. Need to get SW closely involved.   6)  Psych: She remains on the 150 mg Zoloft as recommended. Agree with titration plan as described.  7)  Follow up plan: Due to COVID-19 concerns, we will likely need to plan for limiting to every visits only for transfusions (q6 weeks for now, next one mid May). We will spread visits out to q2 weeks, mostly virtual, with in-person only for infusions  8) Facial numbness/tingling: secondary to apheresis line a month ago. Recommended OTC 4% lidocaine (Aspercreme) PRN  9) Next visit: 2 weeks virtually     It was a pleasure talking to Jennifer today and in continuing to guide her care and transition to adult care moving forward.    I spent a total of 25 minutes face to face with Jennifer during today's office visit. Over 50% of the time was spent discussing her pain medication approach, alternative options to opioids, BMT, and getting a better orthotic. See note for details.        Eric Duncan MD  Hematologist  Division of Hematology, Oncology, and Transplantation  [facility]  [facility] [place]  Pager: [phone]

## 2020-05-22 NOTE — PATIENT INSTRUCTIONS
Jennifer, try over-the-counter 4% lidocaine cream (Aspercreme) for your facial tingling (It cannot be ordered as a prescription.     Next follow up will be virtual with Andrei in 2 weeks. I will do a virtual visit in 4 weeks. Please call next week to get the oxycodone refill (1 month) and oxycontin monthly refill.

## 2020-05-22 NOTE — PATIENT INSTRUCTIONS
Contact Numbers  [facility]: [phone]    After Hours:  [phone]  Triage: [phone]    Please call the [facility] Triage line if you experience a temperature greater than or equal to 100.5, shaking chills, have uncontrolled nausea, vomiting and/or diarrhea, dizziness, shortness of breath, chest pain, bleeding, unexplained bruising, or if you have any other new/concerning symptoms, questions or concerns.     If it is after hours, weekends, or holidays, please call the main hospital  at  [phone] and ask to speak to the Oncology doctor on call.     If you are having any concerning symptoms or wish to speak to a provider before your next infusion visit, please call your care coordinator or triage to notify them so we can adequately serve you.     If you need a refill on a narcotic prescription or other medication, please call triage before your infusion appointment.     Post Blood Products Discharge Instructions    You have just received a blood product.  During the next 48 hours, please be aware of the following symptoms of a blood product reaction.  If you should experience any of these symptoms call your physician.    -Temperature 100.4 or greater  -Shaking or chills  -Shortness of breath or wheezing  -Headache  -Persistent non-productive cough  -Hives  -Itching  -Extreme weakness  -Facial swelling or flushing  -Red urine    If you experience any of these symptoms, please call triage at  [phone] (Monday through  Friday 8:00 AM-4:30 PM):     If after hours, weekends or holidays, please call:  [phone] and ask for the doctor on call for Oncology/Hematology or Women's Health service           May 2020      [place] Monday Tuesday Wednesday Thursday Friday Saturday                            1     2       3     4     5     6     7     8    VIDEO VISIT RETURN  12:45 PM   (30 min.)   Eric Duncan MD   [facility] 9       10     11     12    [facility]  DRAW  11:30 AM   (15 min.)    [facility] DRAW   [facility] Lab Draw    US LWR EXT VENOUS DUPLEX LEFT  12:00 PM   (30 min.)   UCUSV1   [facility] US    P BMT INFUSION 120   1:00 PM   (120 min.)    BMT INFUSION   [facility] Marrow Transplant 13    VIDEO VISIT RETURN   1:05 PM   (50 min.)   Cornelia Suresh PA-C   [facility] 14     15     16       17     18     19     20     21    [facility] DRAW  11:00 AM   (15 min.)   [place]  [facility] DRAW   [facility] Lab Draw 22    [facility] ONC INFUSION 240  11:00 AM   (240 min.)    ONCOLOGY INFUSION   [facility]    [facility] RETURN  12:45 PM   (30 min.)   Eric Duncan MD   [facility] 23       24     25     26     27     28     29     30       31 June 2020 Sunday Monday Tuesday Wednesday Thursday Friday Saturday        1     2     3     4     5     6       7     8     9     10     11     12     13       14     15     16     17     18     19     20       21     22     23     24     25     26     27       28     29     30                                         Lab Results:  No results found for this or any previous visit (from the past 12 hour(s)).
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
## 2020-05-29 ENCOUNTER — PATIENT OUTREACH (OUTPATIENT)
Dept: ONCOLOGY | Facility: CLINIC | Age: 20
End: 2020-05-29

## 2020-05-29 DIAGNOSIS — D57.00 SICKLE CELL CRISIS (H): ICD-10-CM

## 2020-05-29 DIAGNOSIS — D57.1 HB-SS DISEASE WITHOUT CRISIS (H): ICD-10-CM

## 2020-05-29 RX ORDER — OXYCODONE HYDROCHLORIDE 10 MG/1
10 TABLET ORAL EVERY 8 HOURS PRN
Qty: 60 TABLET | Refills: 0 | Status: SHIPPED | OUTPATIENT
Start: 2020-05-29 | End: 2020-06-12

## 2020-05-29 RX ORDER — OXYCODONE HYDROCHLORIDE 10 MG/1
10 TABLET, FILM COATED, EXTENDED RELEASE ORAL EVERY 12 HOURS
Qty: 60 TABLET | Refills: 0 | Status: SHIPPED | OUTPATIENT
Start: 2020-05-29 | End: 2020-05-29

## 2020-05-29 RX ORDER — OXYCODONE HYDROCHLORIDE 10 MG/1
10 TABLET ORAL EVERY 8 HOURS PRN
Qty: 60 TABLET | Refills: 0 | Status: SHIPPED | OUTPATIENT
Start: 2020-05-29 | End: 2020-05-29

## 2020-05-29 RX ORDER — OXYCODONE HYDROCHLORIDE 10 MG/1
10 TABLET, FILM COATED, EXTENDED RELEASE ORAL EVERY 12 HOURS
Qty: 60 TABLET | Refills: 0 | Status: SHIPPED | OUTPATIENT
Start: 2020-05-29 | End: 2020-07-01
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
## 2020-05-29 NOTE — TELEPHONE ENCOUNTER
Pt requesting refills of oxycontin and oxycodone to [facility] on [facility]. Is requesting 30 day supply of oxycodone.    Routed to Dr Duncan

## 2020-06-05 ENCOUNTER — PATIENT OUTREACH (OUTPATIENT)
Dept: ONCOLOGY | Facility: CLINIC | Age: 20
End: 2020-06-05

## 2020-06-05 NOTE — PROGRESS NOTES
Writer placed call to patient to review missed appointments for labs at 1245 and video visit with Andrei Machado PA-C, at 1440. No answer received, writer left VM with request for call back.

## 2020-06-08 ENCOUNTER — PATIENT OUTREACH (OUTPATIENT)
Dept: ONCOLOGY | Facility: CLINIC | Age: 20
End: 2020-06-08

## 2020-06-08 DIAGNOSIS — D57.1 HB-SS DISEASE WITHOUT CRISIS (H): Primary | ICD-10-CM

## 2020-06-08 RX ORDER — CELECOXIB 200 MG/1
200 CAPSULE ORAL DAILY
Qty: 30 CAPSULE | Refills: 0 | Status: SHIPPED | OUTPATIENT
Start: 2020-06-08 | End: 2020-06-19
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
## 2020-06-10 DIAGNOSIS — M25.562 LEFT KNEE PAIN: Primary | ICD-10-CM

## 2020-06-12 DIAGNOSIS — D57.00 SICKLE CELL CRISIS (H): ICD-10-CM

## 2020-06-12 DIAGNOSIS — D57.1 HB-SS DISEASE WITHOUT CRISIS (H): ICD-10-CM

## 2020-06-12 RX ORDER — OXYCODONE HYDROCHLORIDE 10 MG/1
10 TABLET ORAL EVERY 8 HOURS PRN
Qty: 60 TABLET | Refills: 0 | Status: SHIPPED | OUTPATIENT
Start: 2020-06-12 | End: 2020-07-01

## 2020-06-17 ENCOUNTER — APPOINTMENT (OUTPATIENT)
Dept: LAB | Facility: CLINIC | Age: 20
End: 2020-06-17
Attending: PEDIATRICS
Payer: COMMERCIAL

## 2020-06-17 ENCOUNTER — INFUSION THERAPY VISIT (OUTPATIENT)
Dept: TRANSPLANT | Facility: CLINIC | Age: 20
End: 2020-06-17
Attending: PEDIATRICS
Payer: COMMERCIAL

## 2020-06-17 ENCOUNTER — TELEPHONE (OUTPATIENT)
Dept: ONCOLOGY | Facility: CLINIC | Age: 20
End: 2020-06-17

## 2020-06-17 VITALS
TEMPERATURE: 97.8 F | BODY MASS INDEX: 25.75 KG/M2 | WEIGHT: 150 LBS | RESPIRATION RATE: 16 BRPM | SYSTOLIC BLOOD PRESSURE: 120 MMHG | HEART RATE: 94 BPM | OXYGEN SATURATION: 93 % | DIASTOLIC BLOOD PRESSURE: 74 MMHG

## 2020-06-17 DIAGNOSIS — D57.00 SICKLE CELL PAIN CRISIS (H): Primary | ICD-10-CM

## 2020-06-17 DIAGNOSIS — E83.111 IRON OVERLOAD DUE TO REPEATED RED BLOOD CELL TRANSFUSIONS: ICD-10-CM

## 2020-06-17 DIAGNOSIS — D57.1 HB-SS DISEASE WITHOUT CRISIS (H): ICD-10-CM

## 2020-06-17 LAB
ABO + RH BLD: NORMAL
ABO + RH BLD: NORMAL
ALBUMIN SERPL-MCNC: 4.4 G/DL (ref 3.4–5)
ALP SERPL-CCNC: 78 U/L (ref 40–150)
ALT SERPL W P-5'-P-CCNC: 52 U/L (ref 0–50)
ANION GAP SERPL CALCULATED.3IONS-SCNC: 5 MMOL/L (ref 3–14)
AST SERPL W P-5'-P-CCNC: 59 U/L (ref 0–45)
BASOPHILS # BLD AUTO: 0.2 10E9/L (ref 0–0.2)
BASOPHILS NFR BLD AUTO: 1.2 %
BILIRUB SERPL-MCNC: 4.4 MG/DL (ref 0.2–1.3)
BLD GP AB SCN SERPL QL: NORMAL
BLOOD BANK CMNT PATIENT-IMP: NORMAL
BUN SERPL-MCNC: 6 MG/DL (ref 7–30)
CALCIUM SERPL-MCNC: 8.8 MG/DL (ref 8.5–10.1)
CHLORIDE SERPL-SCNC: 110 MMOL/L (ref 94–109)
CO2 SERPL-SCNC: 24 MMOL/L (ref 20–32)
CREAT SERPL-MCNC: 0.55 MG/DL (ref 0.52–1.04)
DIFFERENTIAL METHOD BLD: ABNORMAL
EOSINOPHIL # BLD AUTO: 0.2 10E9/L (ref 0–0.7)
EOSINOPHIL NFR BLD AUTO: 1.8 %
ERYTHROCYTE [DISTWIDTH] IN BLOOD BY AUTOMATED COUNT: 21.2 % (ref 10–15)
FERRITIN SERPL-MCNC: ABNORMAL NG/ML (ref 12–150)
GFR SERPL CREATININE-BSD FRML MDRD: >90 ML/MIN/{1.73_M2}
GLUCOSE SERPL-MCNC: 79 MG/DL (ref 70–99)
HCT VFR BLD AUTO: 23.6 % (ref 35–47)
HGB BLD-MCNC: 8.3 G/DL (ref 11.7–15.7)
IMM GRANULOCYTES # BLD: 0.1 10E9/L (ref 0–0.4)
IMM GRANULOCYTES NFR BLD: 0.7 %
LYMPHOCYTES # BLD AUTO: 2.8 10E9/L (ref 0.8–5.3)
LYMPHOCYTES NFR BLD AUTO: 20.3 %
MCH RBC QN AUTO: 31.3 PG (ref 26.5–33)
MCHC RBC AUTO-ENTMCNC: 35.2 G/DL (ref 31.5–36.5)
MCV RBC AUTO: 89 FL (ref 78–100)
MONOCYTES # BLD AUTO: 1 10E9/L (ref 0–1.3)
MONOCYTES NFR BLD AUTO: 7.4 %
NEUTROPHILS # BLD AUTO: 9.4 10E9/L (ref 1.6–8.3)
NEUTROPHILS NFR BLD AUTO: 68.6 %
NRBC # BLD AUTO: 0.4 10*3/UL
NRBC BLD AUTO-RTO: 3 /100
PLATELET # BLD AUTO: 311 10E9/L (ref 150–450)
POTASSIUM SERPL-SCNC: 3.8 MMOL/L (ref 3.4–5.3)
PROT SERPL-MCNC: 8.1 G/DL (ref 6.8–8.8)
RBC # BLD AUTO: 2.65 10E12/L (ref 3.8–5.2)
RETICS # AUTO: 750.6 10E9/L (ref 25–95)
RETICS/RBC NFR AUTO: 26.8 % (ref 0.5–2)
SODIUM SERPL-SCNC: 139 MMOL/L (ref 133–144)
SPECIMEN EXP DATE BLD: NORMAL
WBC # BLD AUTO: 13.7 10E9/L (ref 4–11)

## 2020-06-17 PROCEDURE — 82728 ASSAY OF FERRITIN: CPT | Performed by: PEDIATRICS

## 2020-06-17 PROCEDURE — 85045 AUTOMATED RETICULOCYTE COUNT: CPT | Performed by: PEDIATRICS

## 2020-06-17 PROCEDURE — 25800030 ZZH RX IP 258 OP 636: Mod: ZF | Performed by: PHYSICIAN ASSISTANT

## 2020-06-17 PROCEDURE — 86900 BLOOD TYPING SEROLOGIC ABO: CPT | Performed by: PEDIATRICS

## 2020-06-17 PROCEDURE — 85025 COMPLETE CBC W/AUTO DIFF WBC: CPT | Performed by: PEDIATRICS

## 2020-06-17 PROCEDURE — 96361 HYDRATE IV INFUSION ADD-ON: CPT | Mod: ZF

## 2020-06-17 PROCEDURE — 96374 THER/PROPH/DIAG INJ IV PUSH: CPT | Mod: ZF

## 2020-06-17 PROCEDURE — 80053 COMPREHEN METABOLIC PANEL: CPT | Performed by: PEDIATRICS

## 2020-06-17 PROCEDURE — 96376 TX/PRO/DX INJ SAME DRUG ADON: CPT | Mod: ZF

## 2020-06-17 PROCEDURE — 25000128 H RX IP 250 OP 636: Mod: ZF | Performed by: PHYSICIAN ASSISTANT

## 2020-06-17 PROCEDURE — 86850 RBC ANTIBODY SCREEN: CPT | Performed by: PEDIATRICS

## 2020-06-17 PROCEDURE — 25000128 H RX IP 250 OP 636: Mod: ZF | Performed by: PEDIATRICS

## 2020-06-17 PROCEDURE — 86901 BLOOD TYPING SEROLOGIC RH(D): CPT | Performed by: PEDIATRICS

## 2020-06-17 RX ORDER — MORPHINE SULFATE 2 MG/ML
2 INJECTION, SOLUTION INTRAMUSCULAR; INTRAVENOUS
Status: CANCELLED
Start: 2020-06-17

## 2020-06-17 RX ORDER — HEPARIN SODIUM (PORCINE) LOCK FLUSH IV SOLN 100 UNIT/ML 100 UNIT/ML
5 SOLUTION INTRAVENOUS EVERY 8 HOURS PRN
Status: DISCONTINUED | OUTPATIENT
Start: 2020-06-17 | End: 2020-06-17 | Stop reason: HOSPADM

## 2020-06-17 RX ORDER — ONDANSETRON 8 MG/1
8 TABLET, FILM COATED ORAL
Status: CANCELLED
Start: 2020-06-17

## 2020-06-17 RX ORDER — HEPARIN SODIUM,PORCINE 10 UNIT/ML
5 VIAL (ML) INTRAVENOUS
Status: CANCELLED | OUTPATIENT
Start: 2020-06-17

## 2020-06-17 RX ORDER — MORPHINE SULFATE 2 MG/ML
2 INJECTION, SOLUTION INTRAMUSCULAR; INTRAVENOUS
Status: DISCONTINUED | OUTPATIENT
Start: 2020-06-17 | End: 2020-06-17 | Stop reason: HOSPADM

## 2020-06-17 RX ORDER — HEPARIN SODIUM (PORCINE) LOCK FLUSH IV SOLN 100 UNIT/ML 100 UNIT/ML
5 SOLUTION INTRAVENOUS
Status: CANCELLED | OUTPATIENT
Start: 2020-06-17

## 2020-06-17 RX ORDER — ONDANSETRON 8 MG/1
8 TABLET, ORALLY DISINTEGRATING ORAL
Status: DISCONTINUED | OUTPATIENT
Start: 2020-06-17 | End: 2020-06-17 | Stop reason: HOSPADM

## 2020-06-17 RX ORDER — DIPHENHYDRAMINE HCL 25 MG
25 CAPSULE ORAL
Status: CANCELLED
Start: 2020-06-17

## 2020-06-17 RX ORDER — DIPHENHYDRAMINE HCL 25 MG
25 CAPSULE ORAL
Status: DISCONTINUED | OUTPATIENT
Start: 2020-06-17 | End: 2020-06-17 | Stop reason: HOSPADM

## 2020-06-17 RX ADMIN — DEXTROSE AND SODIUM CHLORIDE 500 ML: 5; 450 INJECTION, SOLUTION INTRAVENOUS at 15:16

## 2020-06-17 RX ADMIN — MORPHINE SULFATE 2 MG: 2 INJECTION, SOLUTION INTRAMUSCULAR; INTRAVENOUS at 17:09

## 2020-06-17 RX ADMIN — MORPHINE SULFATE 2 MG: 2 INJECTION, SOLUTION INTRAMUSCULAR; INTRAVENOUS at 15:16

## 2020-06-17 RX ADMIN — Medication 5 ML: at 15:03

## 2020-06-17 RX ADMIN — MORPHINE SULFATE 2 MG: 2 INJECTION, SOLUTION INTRAMUSCULAR; INTRAVENOUS at 16:11

## 2020-06-17 ASSESSMENT — PAIN SCALES - GENERAL: PAINLEVEL: EXTREME PAIN (9)

## 2020-06-17 NOTE — PROGRESS NOTES
Infusion Nursing Note:  Jennifer Cervantes presents today for IV fluids and pain meds.    Patient seen by provider today: No   present during visit today: Not Applicable.    Note: Patient presents to infusion room with pain in her lower back she rates 9/10.  She was given 500cc D5 .45NaCl over 2 hours.  2 mg IV morphine given x 3, each dose 1 hour apart. She declined any hot packs or pillows to assist with pain control.  Prior to discharge the patient reported her pain as improved and felt comfortable with managing her pain from home.  Her port was flushed and de-accessed and she was discharged in the care of her ride.      Intravenous Access:  Implanted Port.    Treatment Conditions:  Lab Results   Component Value Date    HGB 8.3 06/17/2020     Lab Results   Component Value Date    WBC 13.7 06/17/2020      Lab Results   Component Value Date    ANEU 9.4 06/17/2020     Lab Results   Component Value Date     06/17/2020      Lab Results   Component Value Date     06/17/2020                   Lab Results   Component Value Date    POTASSIUM 3.8 06/17/2020           No results found for: MAG         Lab Results   Component Value Date    CR 0.55 06/17/2020                   Lab Results   Component Value Date    MICAH 8.8 06/17/2020                Lab Results   Component Value Date    BILITOTAL 4.4 06/17/2020           Lab Results   Component Value Date    ALBUMIN 4.4 06/17/2020                    Lab Results   Component Value Date    ALT 52 06/17/2020           Lab Results   Component Value Date    AST 59 06/17/2020           Post Infusion Assessment:  Patient tolerated infusion without incident.  Blood return noted pre and post infusion.  Site patent and intact, free from redness, edema or discomfort.  No evidence of extravasations.  Access discontinued per protocol.       Discharge Plan:   AVS to patient via MYCHART.  Patient will return for next appointment.   Patient discharged in stable condition  accompanied by: self.  Departure Mode: Ambulatory.    HETAL BARRON RN

## 2020-06-17 NOTE — LETTER
6/17/2020         RE: Jennifer Cervantes  [address]  [facility] [address]        Dear Colleague,    Thank you for referring your patient, Jennifer Cervantes, to the [facility]. Please see a copy of my visit note below.    Infusion Nursing Note:  Jennifer Cervantes presents today for IV fluids and pain meds.    Patient seen by provider today: No   present during visit today: Not Applicable.    Note: Patient presents to infusion room with pain in her lower back she rates 9/10.  She was given 500cc D5 .45NaCl over 2 hours.  2 mg IV morphine given x 3, each dose 1 hour apart. She declined any hot packs or pillows to assist with pain control.  Prior to discharge the patient reported her pain as improved and felt comfortable with managing her pain from home.  Her port was flushed and de-accessed and she was discharged in the care of her ride.      Intravenous Access:  Implanted Port.    Treatment Conditions:  Lab Results   Component Value Date    HGB 8.3 06/17/2020     Lab Results   Component Value Date    WBC 13.7 06/17/2020      Lab Results   Component Value Date    ANEU 9.4 06/17/2020     Lab Results   Component Value Date     06/17/2020      Lab Results   Component Value Date     06/17/2020                   Lab Results   Component Value Date    POTASSIUM 3.8 06/17/2020           No results found for: MAG         Lab Results   Component Value Date    CR 0.55 06/17/2020                   Lab Results   Component Value Date    MICAH 8.8 06/17/2020                Lab Results   Component Value Date    BILITOTAL 4.4 06/17/2020           Lab Results   Component Value Date    ALBUMIN 4.4 06/17/2020                    Lab Results   Component Value Date    ALT 52 06/17/2020           Lab Results   Component Value Date    AST 59 06/17/2020           Post Infusion Assessment:  Patient tolerated infusion without incident.  Blood return noted pre and post infusion.  Site patent and  intact, free from redness, edema or discomfort.  No evidence of extravasations.  Access discontinued per protocol.       Discharge Plan:   AVS to patient via MYCHART.  Patient will return for next appointment.   Patient discharged in stable condition accompanied by: self.  Departure Mode: Ambulatory.    HETAL BARRON RN

## 2020-06-17 NOTE — NURSING NOTE
"Oncology Rooming Note    June 17, 2020 3:27 PM   Jennifer Cervantes is a 20 year old female who presents for:    Chief Complaint   Patient presents with     Port Draw     Labs drawn via PORT by RN in lab. VS taken.      Infusion     add on IVF and pain meds, sickle cell pain     Initial Vitals: /74 (BP Location: Right arm, Patient Position: Sitting, Cuff Size: Adult Regular)   Pulse 94   Temp 97.8  F (36.6  C) (Oral)   Resp 16   Wt 68 kg (150 lb)   SpO2 93%   BMI 25.75 kg/m   Estimated body mass index is 25.75 kg/m  as calculated from the following:    Height as of 4/13/20: 1.626 m (5' 4\").    Weight as of this encounter: 68 kg (150 lb). Body surface area is 1.75 meters squared.  Extreme Pain (9) Comment: Data Unavailable   No LMP recorded. Patient has had an injection.  Allergies reviewed: Yes  Medications reviewed: Yes    Medications: Medication refills not needed today.  Pharmacy name entered into [employer]:    [facility] - [place], [place] - [address]   [id number] - [facility] [address] OF [place], [place] - [phone]  [id number] [address] AT [facility] & [address]  [facility] #[id number] [address] OF [address]    Clinical concerns: patient having pain 9/10 in her lower back. No other issues today.     HETAL BARRON RN              "

## 2020-06-17 NOTE — TELEPHONE ENCOUNTER
[facility] Telephone Triage Note    The following symptoms were reported:     Description:            Onset:  yesterday   Location: back arms legs  Character: Dull ache           Intensity: 9/10    Accompanying Signs & Symptoms:  none    Chest Pain:  denies     Shortness of Breath:  denies     Fever:  denies     Chills:  denies   Cough/sore throat:  denies  Other:  denies    Therapies Tried and outcome: home meds    Improved by: nothing    The following provider was consulted:  None needed per protocol     The following advice/orders were given, and/or interventions recommended:  Ok for fluids and pain meds per JOE Aquino at 2pm with labs at 145pm. Message sent to scheduling.     Patient instructions and/or follow up:  Pt notified of time

## 2020-06-19 ENCOUNTER — VIRTUAL VISIT (OUTPATIENT)
Dept: ONCOLOGY | Facility: CLINIC | Age: 20
End: 2020-06-19
Attending: PEDIATRICS
Payer: COMMERCIAL

## 2020-06-19 DIAGNOSIS — Z79.899 ENCOUNTER FOR MONITORING IRON CHELATION THERAPY: Primary | ICD-10-CM

## 2020-06-19 DIAGNOSIS — D57.1 HB-SS DISEASE WITHOUT CRISIS (H): ICD-10-CM

## 2020-06-19 DIAGNOSIS — Z51.81 ENCOUNTER FOR MONITORING IRON CHELATION THERAPY: Primary | ICD-10-CM

## 2020-06-19 DIAGNOSIS — Z86.73 HISTORY OF STROKE: ICD-10-CM

## 2020-06-19 DIAGNOSIS — F41.9 ANXIETY: ICD-10-CM

## 2020-06-19 LAB
ALBUMIN SERPL-MCNC: 4 G/DL (ref 3.4–5)
ALP SERPL-CCNC: 72 U/L (ref 40–150)
ALT SERPL W P-5'-P-CCNC: 53 U/L (ref 0–50)
ANION GAP SERPL CALCULATED.3IONS-SCNC: 7 MMOL/L (ref 3–14)
AST SERPL W P-5'-P-CCNC: 61 U/L (ref 0–45)
BASOPHILS # BLD AUTO: 0.1 10E9/L (ref 0–0.2)
BASOPHILS NFR BLD AUTO: 0.9 %
BILIRUB SERPL-MCNC: 3.5 MG/DL (ref 0.2–1.3)
BUN SERPL-MCNC: 6 MG/DL (ref 7–30)
CALCIUM SERPL-MCNC: 8.6 MG/DL (ref 8.5–10.1)
CHLORIDE SERPL-SCNC: 109 MMOL/L (ref 94–109)
CO2 SERPL-SCNC: 23 MMOL/L (ref 20–32)
CREAT SERPL-MCNC: 0.5 MG/DL (ref 0.52–1.04)
DIFFERENTIAL METHOD BLD: ABNORMAL
EOSINOPHIL # BLD AUTO: 0.3 10E9/L (ref 0–0.7)
EOSINOPHIL NFR BLD AUTO: 2.2 %
ERYTHROCYTE [DISTWIDTH] IN BLOOD BY AUTOMATED COUNT: 22.6 % (ref 10–15)
FERRITIN SERPL-MCNC: ABNORMAL NG/ML (ref 12–150)
GFR SERPL CREATININE-BSD FRML MDRD: >90 ML/MIN/{1.73_M2}
GLUCOSE SERPL-MCNC: 82 MG/DL (ref 70–99)
HCT VFR BLD AUTO: 23.9 % (ref 35–47)
HGB BLD-MCNC: 8.3 G/DL (ref 11.7–15.7)
IMM GRANULOCYTES # BLD: 0.1 10E9/L (ref 0–0.4)
IMM GRANULOCYTES NFR BLD: 0.8 %
LYMPHOCYTES # BLD AUTO: 2.5 10E9/L (ref 0.8–5.3)
LYMPHOCYTES NFR BLD AUTO: 18.8 %
MCH RBC QN AUTO: 31.9 PG (ref 26.5–33)
MCHC RBC AUTO-ENTMCNC: 34.7 G/DL (ref 31.5–36.5)
MCV RBC AUTO: 92 FL (ref 78–100)
MONOCYTES # BLD AUTO: 1 10E9/L (ref 0–1.3)
MONOCYTES NFR BLD AUTO: 7.3 %
NEUTROPHILS # BLD AUTO: 9.4 10E9/L (ref 1.6–8.3)
NEUTROPHILS NFR BLD AUTO: 70 %
NRBC # BLD AUTO: 0.5 10*3/UL
NRBC BLD AUTO-RTO: 4 /100
PLATELET # BLD AUTO: 309 10E9/L (ref 150–450)
POTASSIUM SERPL-SCNC: 3.7 MMOL/L (ref 3.4–5.3)
PROT SERPL-MCNC: 7.8 G/DL (ref 6.8–8.8)
RBC # BLD AUTO: 2.6 10E12/L (ref 3.8–5.2)
RETICS # AUTO: 698.4 10E9/L (ref 25–95)
RETICS/RBC NFR AUTO: 28.2 % (ref 0.5–2)
SODIUM SERPL-SCNC: 139 MMOL/L (ref 133–144)
WBC # BLD AUTO: 13.4 10E9/L (ref 4–11)

## 2020-06-19 PROCEDURE — 40001009 ZZH VIDEO/TELEPHONE VISIT; NO CHARGE

## 2020-06-19 PROCEDURE — 85025 COMPLETE CBC W/AUTO DIFF WBC: CPT | Performed by: PEDIATRICS

## 2020-06-19 PROCEDURE — 85045 AUTOMATED RETICULOCYTE COUNT: CPT | Performed by: PEDIATRICS

## 2020-06-19 PROCEDURE — 36415 COLL VENOUS BLD VENIPUNCTURE: CPT | Performed by: PEDIATRICS

## 2020-06-19 PROCEDURE — 80053 COMPREHEN METABOLIC PANEL: CPT | Performed by: PEDIATRICS

## 2020-06-19 PROCEDURE — 82728 ASSAY OF FERRITIN: CPT | Performed by: PEDIATRICS

## 2020-06-19 RX ORDER — HYDROXYUREA 500 MG/1
2000 CAPSULE ORAL DAILY
Qty: 120 CAPSULE | Refills: 4 | Status: SHIPPED | OUTPATIENT
Start: 2020-06-19 | End: 2020-08-17 | Stop reason: ALTCHOICE

## 2020-06-19 RX ORDER — SERTRALINE HYDROCHLORIDE 100 MG/1
200 TABLET, FILM COATED ORAL DAILY
Qty: 30 TABLET | Refills: 1 | Status: SHIPPED | OUTPATIENT
Start: 2020-06-19 | End: 2020-08-17

## 2020-06-19 NOTE — PROGRESS NOTES
"    [facility]ick[facility] Clinic Follow Up Outpatient Visit    Date of encounter: 6/19/2020    Jennifer Cervantes is a 20 year old female who is being evaluated via a billable phne visit.  (initial attempts were for a video visit but were unsuccessful)    The patient has been notified of following:     \"This phone visit will be conducted via a call between you and your physician/provider. We have found that certain health care needs can be provided without the need for an in-person physical exam.  This service lets us provide the care you need with a phone conversation.  If a prescription is necessary we can send it directly to your pharmacy.  If lab work is needed we can place an order for that and you can then stop by our lab to have the test done at a later time.    Phone visits are billed at different rates depending on your insurance coverage.  Please reach out to your insurance provider with any questions.    If during the course of the call the physician/provider feels a phone visit is not appropriate, you will not be charged for this service.\"    Patient has given verbal consent for Phone visit? Yes    Will anyone else be joining your video visit? No      Visit Details    Type of service:  Phone Visit      Phone visit:  1:18 PM  1:33 pm   15 min  Originating Location (pt. Location): Home    Distant Location (provider location):  [facility]     Platform used for Video Visit: Clarisa Duncan MD          Visit Notes    Jennifer Cervantes is a 20 year old female who is has a follow up outpatient hematology visit for Hb SS.    Jennifer's visit was done on the phone due to difficulty with the video    Interval History  Jennifer has had a tougher last few weeks but she has been able to get by at home most of the time. She had some pain this past week and has some more stress at home, though she did not elaborate. She has been trying to manage her pain at home as much as possible because she does not " "like the treatment she gets if admitted, based mostly on what she perceives as a lack of communication between providers. Her facial pain is present and stable but still frustrating. No cough, no fever, no sick contacts. She is due to meet with Dr. Leonard to discuss BMT in 1 month.       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 She also has significant anxiety and depression with a strong anxiety about transferring to the adult clinic. She usually has pain crises secondary to the anxiety. This \"fear of the unknown\" is significant enough that she wants to tour the inpatient floor before the transition or before she gets admitted there. [facility] with recurrent pain crises and is actually out on pass now but is still admitted as of Friday 2/28. She has no real support from family at home and that, combined with her cognitive delays, make her care even more complex. She is having some back pain today which is a frequent location for her. She does say that her oral options do take an edge off. No fevers or cough, chest pain, dyspnea, bruising, or GI symptoms today. She has gotten a couple doses of Desferal for iron overload as she has been on chronic transfusions post-stroke for a long time. She is up to date on immunizations and got to meet with Daya Carter last week (also while on pass). It is unclear when [facility] will discharge [facility] but based on recent history, she thinks she will have a pain crisis soon after discharge.    Key results prior to referral:  MR ABDOMEN W/O CONTRAST ([facility]), 10/3/2017  (unlikely to be most recent " one)     Comparison: 12/28/2015   Clinical history: Sickle cell disease   Findings:     Average liver iron concentration:  28.6 mg/g dry tissue, previously 22.8 mg/g dry tissue (NR: 0.17-1.8)  513 mmol/kg dry tissue, previously 400.8 mmol/kg dry tissue (NR: 3-33)     There is also iron deposition in the spleen, which is enlarged.  Prominent vessels in the left upper quadrant, which may be related to varices, are unchanged from the comparison examinations.                                                            Impression:  1. Increase in elevated liver concentration.  2. Splenomegaly. Question portal hypertension.     WAYNE CURTIS MD    11/4/19  MRI Pelvis  Summary:  Marrow changes consistent with SCD but no convincing evidence of AVN at this time.     1/28/20 MRI/MRA  Comparison 5/8/15  Summary:  Stable appearance of the brain and cerebral vasculature compared to 5/8/2015, including remote left MCA teritory infarct. No evidence of new infarct or new abnormality on MRA    3/10/2020 Cardiac MRI      Review of systems:  A complete 14 point review of systems was completed. All were negative except for what was reported in the HPI or highlighted here.    --------------------------------  Plan last reviewed with patient: 5/22/2020    Patient background: 21yo F, enjoys movies and kids though there are times where she does not really want to talk to people. Does not have a lot of social support at home.     Sickle Cell Disease History  Primary Hematologist: Perla  PCP: Raul  Genotype: SS  Acute Pain Crisis Treatment:    ER/Acute Care/Infusion Clinic:   o Morphine 2 mg IVP/SC Q1H X 3 doses  o Toradol  o Maintenance IV fluids with D5 half-normal saline  o Other: Benadryl PO and Zofran 8 mg IV PRN itching or nausea    Inpatient:  o Opioid: Morphine 2 mg IV Q1H PRN until PCA starts  o PCA plan:   - PCA button dose: Morphine 1 mg  - Lockout: 10 minutes  - Continuous Infusion: consider 1 mg/hr  - One hr max: 6  mg  o Try to wean to OxyCONTIN 10 mg q12h with Morphine 0.7 mg IV q10 min PRN rather than continuous infusion  o Other Medications: Benadryl, Zofran  o If PCA not working, she Has had success with ketamine starting at 4mg/h and advancing only to 6mg/h, as 8mg/h made her feel quite poorly.  o If giving scheduled toradol, hold ASA (ok to give celebrex if she prefers)  o Supportive Care: Docusate, Senna  Chronic Pain Medications:    Home regimen  OxyCONTIN 10 mg po q 12 hrs and oxycodone 10 mg p.o. q.6 hours p.r.n. breakthrough pain #60/month.  She also continues on Celebrex, and Zoloft among other medications.    -Also benefits from mental health visits, acupuncture  Baseline Hemoglobin: 9.5 g/dL (on chronic transfusions)  Hydroxyurea use: Yes  H/O blood transfusions: Yes, several (iron overload) Most recent 5/22/20    H/O Transfusion Reactions: no    Antibodies:none  H/O Acute Chest Syndrome: Yes    Last episode: 10/2019     ICU/intubation: unsure  H/O Stroke: Yes (managed with chronic transfusions (has left her with neurodevelopmental deficits)  H/O VTE: no  H/O Cholecystectomy or Splenectomy: no  H/O Asthma, Pulm HTN, AVN, Leg Ulcers, Nephropathy, Retinopathy, etc: Iron overload, asthma, chronic lung disease    EMERGENCY CARE PLAN  DO NOT TRANSFUSE WITHOUT DISCUSSING WITH HEMATOLOGY ATTENDING (available 24/7).       TRANSFUSION IS RISKY DUE TO RISK OF ALLOIMMUNIZATION AGAINST RBC ANTIGENS AND IRON OVERLOAD.  INDICATIONS FOR TRANSFUSION ARE ACUTE STROKE AND ACUTE CHEST SYNDROME (hypoxia plus infiltrate, not chest pain).  DO NOT TRANSFUSE FOR ANEMIA      -------------------------------------------    Sickle Cell Disease Comprehensive Checklist    Bone Health/Avascular Necrosis Screening/Symptoms (each visit): none today    Leg Ulcer evaluation (every visit): none    Hypertension (every visit): none    Last ophthalmologic exam: within last year (timing unsure)    Last urinalysis for microalbuminuria/CKD (annually):  within last year    Last pulmonary evaluation (asthma, AMAN, pulm HTN): within last year    Stroke/silent cerebral infarct Hx (Y/N): Yes    Last PCP Visit: 2/2020    Vaccines:  o PCV13: 5/13/19  o Pneumovax (PPSV23): 3/04, 10/09, 7/12/19 (next due 7/2024)  o Menactra: 4/2010, 9/2015 (MCV due Sept 2020)  o Trumemba:  o Influenza: got 19-20 vaccine    Audiology (chelation): needed post-transition    Past Medical History:  Past Medical History:   Diagnosis Date     Anemia      Anxiety      Bleeding disorder (H)      Blood clotting disorder (H)      Cerebral infarction (H) 2015     Cognitive developmental delay     low IQ. 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 (H)     right hand contractures     Iron overload due to repeated red blood cell transfusions      Migraines      Oppositional defiant behavior      Uncomplicated asthma        Past Surgical History:  Past Surgical History:   Procedure Laterality Date     AS INSERT TUNNELED CV 2 CATH W/O PORT/PUMP       C BREAST REDUCTION (INCLUDES LIPO) TIER 3 Bilateral 04/23/2019     IR CVC NON TUNNEL PLACEMENT  4/7/2020     REPAIR TENDON ELBOW Right 10/2/2019    Procedure: Right Elbow Flexor Lengthening, Flexor Pronator Slide Of Wrist and Finger, Thumb Adductor Lengthening;  Surgeon: Anai Franco MD;  Location: [facility] OR     TONSILLECTOMY Bilateral 10/2/2019    Procedure: Bilateral Tonsillectomy;  Surgeon: Farhana Guy MD;  Location: [facility] OR       Family History:   Family History   Problem Relation Age of Onset     Sickle Cell Trait Mother      Sickle Cell Trait Father        Social History:  Social History     Socioeconomic History     Marital status: Single     Spouse name: Not on file     Number of children: Not on file     Years of education: Not on file     Highest education level: Not on file   Occupational History      "Not on file   Social Needs     Financial resource strain: Not on file     Food insecurity     Worry: Not on file     Inability: Not on file     Transportation needs     Medical: Not on file     Non-medical: Not on file   Tobacco Use     Smoking status: Never Smoker     Smokeless tobacco: Never Used   Substance and Sexual Activity     Alcohol use: Not Currently     Alcohol/week: 0.0 standard drinks     Drug use: Never     Sexual activity: Not Currently     Partners: Male     Birth control/protection: Other   Lifestyle     Physical activity     Days per week: Not on file     Minutes per session: Not on file     Stress: Not on file   Relationships     Social connections     Talks on phone: Not on file     Gets together: Not on file     Attends Oriental orthodox service: Not on file     Active member of club or organization: Not on file     Attends meetings of clubs or organizations: Not on file     Relationship status: Not on file     Intimate partner violence     Fear of current or ex partner: Not on file     Emotionally abused: Not on file     Physically abused: Not on file     Forced sexual activity: Not on file   Other Topics Concern     Parent/sibling w/ CABG, MI or angioplasty before 65F 55M? Not Asked   Social History Narrative    Lives with mom and extended family but \"toxic environment\" per her report. She would like to move out but cannot financially do so. She has minimal support at home despite her significant SCD comorbidities and cognitive delay from stroke.       Medications:  Current Outpatient Medications   Medication     acetaminophen (TYLENOL) 325 MG tablet     albuterol (PROVENTIL) (2.5 MG/3ML) 0.083% neb solution     aspirin (ASPIRIN) 81 MG EC tablet     Budesonide-Formoterol Fumarate (SYMBICORT IN)     celecoxib (CELEBREX) 100 MG capsule     celecoxib (CELEBREX) 200 MG capsule     diphenhydrAMINE (BENADRYL) 25 MG capsule     EPINEPHrine (EPIPEN) 0.3 MG/0.3ML injection     erythromycin ethylsuccinate " (E.E.S.) 400 MG tablet     gabapentin (NEURONTIN) 300 MG capsule     GNP SENNA-LAX 8.6 MG tablet     hydroxyurea (HYDREA) 500 MG capsule     ibuprofen (ADVIL/MOTRIN) 200 MG tablet     JADENU 360 MG tablet     medroxyPROGESTERone (DEPO-PROVERA) 150 MG/ML IM injection     medroxyPROGESTERone (PROVERA) 2.5 MG tablet     naloxone (NARCAN) 4 MG/0.1ML nasal spray     omeprazole (PRILOSEC) 40 MG DR capsule     ondansetron (ZOFRAN) 8 MG tablet     oxyCODONE IR (ROXICODONE) 10 MG tablet     OXYCONTIN 10 MG 12 hr tablet     sertraline (ZOLOFT) 100 MG tablet     No current facility-administered medications for this visit.          Physical Exam:   No exam (phone visit)    Imaging: none today    Assessment:  Jennifer Cervantes is a 20 year old female with HbSS. Her disease has been complicated by stroke leading to significant cognitive delay and right UE hemiparesis, high anxiety leading to frequent pain crises on top of chronic pain syndrome, poor social structure at home with no real support, and iron overload secondary to repeated transfusions. She is overall doing well though she needed to come in for an opioid infusion a few days ago      Plan:  1) HbSS and complications   -- Pain plan reviewed and updated.    Recommended trying 400-600 ibuprofen when pain is moderate to fill in gaps from oxycodone. Tylenol is okay for mild pain but may not directly affect SCD pathophysiology in the same way as ibuprofen              --Discussed BMT again. Discussed with Dr. Leonard. Visit scheduled for 7/16.   --She had been on q6 week transfusions (exchanged 4/7) Last transfusion early May. Had CVA ~2 yo and another TIA ~2014 during a trial off transfusions and just HU. My goal is to ultimately stop the transfusions given her significant iron overload but she has taken some time to become comfortable with this. She is ~2 months out from the exchange.   --Needs aggressive chelation. Jadenu now 1440 mg daily and well-tolerated per report. Also  need to have audiology appt when COVID restrictions zaid. Referral placed today.   --Continue ASA post-stroke  2) Labs: CBC, CMP, ferritin (still slowly rising after a drop for a few months with increased-dose Jadenu   3) Medication Changes: Oxycodone refilled recently. ASA and HU refilled today. Zoloft increased to 200 mg due to still-high anxiety  4)  Other orders/recommendations: Will try to continue spreading out transfusions over time until we can stop.  5)  HCM: PCP is Daya Carter. Need to get SW closely involved.   6)  Psych: Titrating up to 200 mg Zoloft today  7)  Follow up plan: Due to COVID-19 concerns, we will likely need to plan for limiting to every visits only for transfusions. We will spread visits out to q4 weeks, mostly virtual, with in-person only for infusions  8) Facial numbness/tingling: secondary to apheresis line a month ago. Recommended OTC 4% lidocaine (Aspercreme) PRN  9) Next visit: 4 weeks virtually     It was a pleasure talking to Jennifer today and in continuing to guide her care and transition to adult care moving forward.    I spent a total of 15 minutes on the phone with Jennifer during today's office visit. See note for details.        Eric Duncan MD  Hematologist  Division of Hematology, Oncology, and Transplantation  [facility]  [facility]  Pager: [phone]

## 2020-06-19 NOTE — LETTER
"    6/19/2020         RE: Jennifer Cervantes  [address]  [facility] [address]        Dear Colleague,    Thank you for referring your patient, Jennifer Cervantes, to the [facility]. Please see a copy of my visit note below.        Sickle Cell Clinic Follow Up Outpatient Visit    Date of encounter: 6/19/2020    Jennifer Cervantes is a 20 year old female who is being evaluated via a billable phne visit.  (initial attempts were for a video visit but were unsuccessful)    The patient has been notified of following:     \"This phone visit will be conducted via a call between you and your physician/provider. We have found that certain health care needs can be provided without the need for an in-person physical exam.  This service lets us provide the care you need with a phone conversation.  If a prescription is necessary we can send it directly to your pharmacy.  If lab work is needed we can place an order for that and you can then stop by our lab to have the test done at a later time.    Phone visits are billed at different rates depending on your insurance coverage.  Please reach out to your insurance provider with any questions.    If during the course of the call the physician/provider feels a phone visit is not appropriate, you will not be charged for this service.\"    Patient has given verbal consent for Phone visit? Yes    Will anyone else be joining your video visit? No      Visit Details    Type of service:  Phone Visit      Phone visit:  1:18 PM  1:33 pm   15 min  Originating Location (pt. Location): Home    Distant Location (provider location):  [facility]     Platform used for Video Visit: Clarisa Duncan MD          Visit Notes    Jennifer Cervantes is a 20 year old female who is has a follow up outpatient hematology visit for Hb SS.    Jennifer's visit was done on the phone due to difficulty with the video    Interval History  Jennifer has had a tougher last few weeks " "but she has been able to get by at home most of the time. She had some pain this past week and has some more stress at home, though she did not elaborate. She has been trying to manage her pain at home as much as possible because she does not like the treatment she gets if admitted, based mostly on what she perceives as a lack of communication between providers. Her facial pain is present and stable but still frustrating. No cough, no fever, no sick contacts. She is due to meet with Dr. Leonard to discuss BMT in 1 month.       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 She also has significant anxiety and depression with a strong anxiety about transferring to the adult clinic. She usually has pain crises secondary to the anxiety. This \"fear of the unknown\" is significant enough that she wants to tour the inpatient floor before the transition or before she gets admitted there. [facility] with recurrent pain crises and is actually out on pass now but is still admitted as of Friday 2/28. She has no real support from family at home and that, combined with her cognitive delays, make her care even more complex. She is having some back pain today which is a frequent location for her. She does say that her oral options do take an edge off. No fevers or cough, chest pain, dyspnea, bruising, or GI symptoms today. She has gotten a couple doses of Desferal for iron overload as she has been on chronic transfusions post-stroke for a long time. She is up to date on immunizations and got to meet with Daya Carter last week (also while on pass). It is unclear when " she will discharge from [facility] but based on recent history, she thinks she will have a pain crisis soon after discharge.    Key results prior to referral:  MR ABDOMEN W/O CONTRAST (F[facility]iscan), 10/3/2017  (unlikely to be most recent one)     Comparison: 12/28/2015   Clinical history: Sickle cell disease   Findings:     Average liver iron concentration:  28.6 mg/g dry tissue, previously 22.8 mg/g dry tissue (NR: 0.17-1.8)  513 mmol/kg dry tissue, previously 400.8 mmol/kg dry tissue (NR: 3-33)     There is also iron deposition in the spleen, which is enlarged.  Prominent vessels in the left upper quadrant, which may be related to varices, are unchanged from the comparison examinations.                                                            Impression:  1. Increase in elevated liver concentration.  2. Splenomegaly. Question portal hypertension.     WAYNE CURTIS MD    11/4/19  MRI Pelvis  Summary:  Marrow changes consistent with SCD but no convincing evidence of AVN at this time.     1/28/20 MRI/MRA  Comparison 5/8/15  Summary:  Stable appearance of the brain and cerebral vasculature compared to 5/8/2015, including remote left MCA teritory infarct. No evidence of new infarct or new abnormality on MRA    3/10/2020 Cardiac MRI      Review of systems:  A complete 14 point review of systems was completed. All were negative except for what was reported in the HPI or highlighted here.    --------------------------------  Plan last reviewed with patient: 5/22/2020    Patient background: 19yo F, enjoys movies and kids though there are times where she does not really want to talk to people. Does not have a lot of social support at home.     Sickle Cell Disease History  Primary Hematologist: Perla  PCP: Raul  Genotype: SS  Acute Pain Crisis Treatment:    ER/Acute Care/Infusion Clinic:   o Morphine 2 mg IVP/SC Q1H X 3 doses  o Toradol  o Maintenance IV fluids with D5 half-normal saline  o Other: Benadryl PO and  Zofran 8 mg IV PRN itching or nausea    Inpatient:  o Opioid: Morphine 2 mg IV Q1H PRN until PCA starts  o PCA plan:   - PCA button dose: Morphine 1 mg  - Lockout: 10 minutes  - Continuous Infusion: consider 1 mg/hr  - One hr max: 6 mg  o Try to wean to OxyCONTIN 10 mg q12h with Morphine 0.7 mg IV q10 min PRN rather than continuous infusion  o Other Medications: Benadryl, Zofran  o If PCA not working, she Has had success with ketamine starting at 4mg/h and advancing only to 6mg/h, as 8mg/h made her feel quite poorly.  o If giving scheduled toradol, hold ASA (ok to give celebrex if she prefers)  o Supportive Care: Docusate, Senna  Chronic Pain Medications:    Home regimen  OxyCONTIN 10 mg po q 12 hrs and oxycodone 10 mg p.o. q.6 hours p.r.n. breakthrough pain #60/month.  She also continues on Celebrex, and Zoloft among other medications.    -Also benefits from mental health visits, acupuncture  Baseline Hemoglobin: 9.5 g/dL (on chronic transfusions)  Hydroxyurea use: Yes  H/O blood transfusions: Yes, several (iron overload) Most recent 5/22/20    H/O Transfusion Reactions: no    Antibodies:none  H/O Acute Chest Syndrome: Yes    Last episode: 10/2019     ICU/intubation: unsure  H/O Stroke: Yes (managed with chronic transfusions (has left her with neurodevelopmental deficits)  H/O VTE: no  H/O Cholecystectomy or Splenectomy: no  H/O Asthma, Pulm HTN, AVN, Leg Ulcers, Nephropathy, Retinopathy, etc: Iron overload, asthma, chronic lung disease    EMERGENCY CARE PLAN  DO NOT TRANSFUSE WITHOUT DISCUSSING WITH HEMATOLOGY ATTENDING (available 24/7).       TRANSFUSION IS RISKY DUE TO RISK OF ALLOIMMUNIZATION AGAINST RBC ANTIGENS AND IRON OVERLOAD.  INDICATIONS FOR TRANSFUSION ARE ACUTE STROKE AND ACUTE CHEST SYNDROME (hypoxia plus infiltrate, not chest pain).  DO NOT TRANSFUSE FOR ANEMIA      -------------------------------------------    Sickle Cell Disease Comprehensive Checklist    Bone Health/Avascular Necrosis  Screening/Symptoms (each visit): none today    Leg Ulcer evaluation (every visit): none    Hypertension (every visit): none    Last ophthalmologic exam: within last year (timing unsure)    Last urinalysis for microalbuminuria/CKD (annually): within last year    Last pulmonary evaluation (asthma, AMAN, pulm HTN): within last year    Stroke/silent cerebral infarct Hx (Y/N): Yes    Last PCP Visit: 2/2020    Vaccines:  o PCV13: 5/13/19  o Pneumovax (PPSV23): 3/04, 10/09, 7/12/19 (next due 7/2024)  o Menactra: 4/2010, 9/2015 (MCV due Sept 2020)  o Trumemba:  o Influenza: got 19-20 vaccine    Audiology (chelation): needed post-transition    Past Medical History:  Past Medical History:   Diagnosis Date     Anemia      Anxiety      Bleeding disorder (H)      Blood clotting disorder (H)      Cerebral infarction (H) 2015     Cognitive developmental delay     low IQ. 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 (H)     right hand contractures     Iron overload due to repeated red blood cell transfusions      Migraines      Oppositional defiant behavior      Uncomplicated asthma        Past Surgical History:  Past Surgical History:   Procedure Laterality Date     AS INSERT TUNNELED CV 2 CATH W/O PORT/PUMP       C BREAST REDUCTION (INCLUDES LIPO) TIER 3 Bilateral 04/23/2019     IR CVC NON TUNNEL PLACEMENT  4/7/2020     REPAIR TENDON ELBOW Right 10/2/2019    Procedure: Right Elbow Flexor Lengthening, Flexor Pronator Slide Of Wrist and Finger, Thumb Adductor Lengthening;  Surgeon: Anai Franco MD;  Location: [facility] OR     TONSILLECTOMY Bilateral 10/2/2019    Procedure: Bilateral Tonsillectomy;  Surgeon: Farhana Guy MD;  Location: [facility] OR       Family History:   Family History   Problem Relation Age of Onset     Sickle Cell Trait Mother      Sickle Cell Trait Father        Social  "History:  Social History     Socioeconomic History     Marital status: Single     Spouse name: Not on file     Number of children: Not on file     Years of education: Not on file     Highest education level: Not on file   Occupational History     Not on file   Social Needs     Financial resource strain: Not on file     Food insecurity     Worry: Not on file     Inability: Not on file     Transportation needs     Medical: Not on file     Non-medical: Not on file   Tobacco Use     Smoking status: Never Smoker     Smokeless tobacco: Never Used   Substance and Sexual Activity     Alcohol use: Not Currently     Alcohol/week: 0.0 standard drinks     Drug use: Never     Sexual activity: Not Currently     Partners: Male     Birth control/protection: Other   Lifestyle     Physical activity     Days per week: Not on file     Minutes per session: Not on file     Stress: Not on file   Relationships     Social connections     Talks on phone: Not on file     Gets together: Not on file     Attends [employer] service: Not on file     Active member of club or organization: Not on file     Attends meetings of clubs or organizations: Not on file     Relationship status: Not on file     Intimate partner violence     Fear of current or ex partner: Not on file     Emotionally abused: Not on file     Physically abused: Not on file     Forced sexual activity: Not on file   Other Topics Concern     Parent/sibling w/ CABG, MI or angioplasty before 65F 55M? Not Asked   Social History Narrative    Lives with mom and extended family but \"toxic environment\" per her report. She would like to move out but cannot financially do so. She has minimal support at home despite her significant SCD comorbidities and cognitive delay from stroke.       Medications:  Current Outpatient Medications   Medication     acetaminophen (TYLENOL) 325 MG tablet     albuterol (PROVENTIL) (2.5 MG/3ML) 0.083% neb solution     aspirin (ASPIRIN) 81 MG EC tablet     " Budesonide-Formoterol Fumarate (SYMBICORT IN)     celecoxib (CELEBREX) 100 MG capsule     celecoxib (CELEBREX) 200 MG capsule     diphenhydrAMINE (BENADRYL) 25 MG capsule     EPINEPHrine (EPIPEN) 0.3 MG/0.3ML injection     erythromycin ethylsuccinate (E.E.S.) 400 MG tablet     gabapentin (NEURONTIN) 300 MG capsule     GNP SENNA-LAX 8.6 MG tablet     hydroxyurea (HYDREA) 500 MG capsule     ibuprofen (ADVIL/MOTRIN) 200 MG tablet     JADENU 360 MG tablet     medroxyPROGESTERone (DEPO-PROVERA) 150 MG/ML IM injection     medroxyPROGESTERone (PROVERA) 2.5 MG tablet     naloxone (NARCAN) 4 MG/0.1ML nasal spray     omeprazole (PRILOSEC) 40 MG DR capsule     ondansetron (ZOFRAN) 8 MG tablet     oxyCODONE IR (ROXICODONE) 10 MG tablet     OXYCONTIN 10 MG 12 hr tablet     sertraline (ZOLOFT) 100 MG tablet     No current facility-administered medications for this visit.          Physical Exam:   No exam (phone visit)    Imaging: none today    Assessment:  Jennifer Cervantes is a 20 year old female with HbSS. Her disease has been complicated by stroke leading to significant cognitive delay and right UE hemiparesis, high anxiety leading to frequent pain crises on top of chronic pain syndrome, poor social structure at home with no real support, and iron overload secondary to repeated transfusions. She is overall doing well though she needed to come in for an opioid infusion a few days ago      Plan:  1) HbSS and complications   -- Pain plan reviewed and updated.    Recommended trying 400-600 ibuprofen when pain is moderate to fill in gaps from oxycodone. Tylenol is okay for mild pain but may not directly affect SCD pathophysiology in the same way as ibuprofen              --Discussed BMT again. Discussed with Dr. Leonard. Visit scheduled for 7/16.   --She had been on q6 week transfusions (exchanged 4/7) Last transfusion early May. Had CVA ~2 yo and another TIA ~2014 during a trial off transfusions and just HU. My goal is to ultimately  stop the transfusions given her significant iron overload but she has taken some time to become comfortable with this. She is ~2 months out from the exchange.   --Needs aggressive chelation. Jadenu now 1440 mg daily and well-tolerated per report. Also need to have audiology appt when COVID restrictions zaid. Referral placed today.   --Continue ASA post-stroke  2) Labs: CBC, CMP, ferritin (still slowly rising after a drop for a few months with increased-dose Jadenu   3) Medication Changes: Oxycodone refilled recently. ASA and HU refilled today. Zoloft increased to 200 mg due to still-high anxiety  4)  Other orders/recommendations: Will try to continue spreading out transfusions over time until we can stop.  5)  HCM: PCP is Daya Carter. Need to get SW closely involved.   6)  Psych: Titrating up to 200 mg Zoloft today  7)  Follow up plan: Due to COVID-19 concerns, we will likely need to plan for limiting to every visits only for transfusions. We will spread visits out to q4 weeks, mostly virtual, with in-person only for infusions  8) Facial numbness/tingling: secondary to apheresis line a month ago. Recommended OTC 4% lidocaine (Aspercreme) PRN  9) Next visit: 4 weeks virtually     It was a pleasure talking to Jennifer today and in continuing to guide her care and transition to adult care moving forward.    I spent a total of 15 minutes on the phone with Jennifer during today's office visit. See note for details.        Eric Duncan MD  Hematologist  Division of Hematology, Oncology, and Transplantation  [facility]  [employer] [place]  Pager: [phone]

## 2020-06-25 ENCOUNTER — PATIENT OUTREACH (OUTPATIENT)
Dept: ONCOLOGY | Facility: CLINIC | Age: 20
End: 2020-06-25

## 2020-06-25 DIAGNOSIS — E83.111 IRON OVERLOAD DUE TO REPEATED RED BLOOD CELL TRANSFUSIONS: Primary | ICD-10-CM

## 2020-06-25 DIAGNOSIS — D57.1 HB-SS DISEASE WITHOUT CRISIS (H): ICD-10-CM

## 2020-06-26 ENCOUNTER — ANCILLARY PROCEDURE (OUTPATIENT)
Dept: GENERAL RADIOLOGY | Facility: CLINIC | Age: 20
End: 2020-06-26
Attending: ORTHOPAEDIC SURGERY
Payer: COMMERCIAL

## 2020-06-26 DIAGNOSIS — M25.562 LEFT KNEE PAIN: ICD-10-CM

## 2020-06-29 ENCOUNTER — VIRTUAL VISIT (OUTPATIENT)
Dept: ORTHOPEDICS | Facility: CLINIC | Age: 20
End: 2020-06-29
Attending: PEDIATRICS
Payer: COMMERCIAL

## 2020-06-29 VITALS — WEIGHT: 150 LBS | BODY MASS INDEX: 25.75 KG/M2

## 2020-06-29 DIAGNOSIS — D57.1 HB-SS DISEASE WITHOUT CRISIS (H): ICD-10-CM

## 2020-06-29 ASSESSMENT — PATIENT HEALTH QUESTIONNAIRE - PHQ9: SUM OF ALL RESPONSES TO PHQ QUESTIONS 1-9: 0

## 2020-06-29 NOTE — LETTER
6/29/2020     RE: Jennifer Cervantes  [address]  [facility] [address]    Dear Colleague,    Thank you for referring your patient, Jennifer Cervantes, to the [facility]. Please see a copy of my visit note below.    Jennifer Cervantes is a 20 year old female who is being evaluated via a billable video visit.      CHIEF CONCERN: Left knee pain    HISTORY:   Very pleasant 20-year-old female.  Has a long history guarding the left knee.  No specific injury.  Never hurts in her right side.  She is a past medical history for sickle cell disease with sickling episodes in the past.  She has not had an MRI of her knee recently.  She obtained x-rays in preparation of this visit.  She states that it bothers her on a near daily basis.  She also has some pain with flexion extension of her knee.  Thank you    PAST MEDICAL HISTORY: (Reviewed with the patient and in the [facility] medical record)  1. Sickle cell disease    PAST SURGICAL HISTORY: (Reviewed with the patient and in the [facility] medical record)  1. No knee surgery    MEDICATIONS: (Reviewed with the patient and in the [facility] medical record)    Notable medications include: It does appear that she taking OxyContin and oxycodone from review of the medical records    ALLERGIES: (Reviewed with the patient and in the [facility] medical record)  1. Multiple allergies are listed with in [facility]      SOCIAL HISTORY: (Reviewed with the patient and in the medical record)  --Tobacco: Non-smoker  --Avocation/Sport: She would like to be more active than she was but her knee pain limits her    FAMILY HISTORY: (Reviewed with the patient and in the medical record)  -- No family history of bleeding, clotting, or difficulty with anesthesia    REVIEW OF SYSTEMS: (Reviewed with the patient and on the health intake form)  -- A comprehensive 10 point review of systems was conducted and is negative except as noted in the HPI    EXAM:     General: Awake, Alert and Oriented, No acute Distress. Articulate  and Interactive    Body mass index is 25.75 kg/m .    Left lower extremity :    Skin is Warm and Well perfused, no suggestion of infection by patient's report    No specific examination was completed today as this was a video visit    EHL/FHL/TA/GS 5/5    Sensation intact L3-S1    2+ Dorsalis Pedis Pulse    IMAGING:    Plain Radiographs: Plain radiographs obtained in preparation of this visit show overall well-preserved joint space no fractures dislocations    MRI: None    ASSESSMENT:  1. Left knee pain in the setting of sickle cell disease    PLAN:  1. At this time I think is important that we get an MRI of her knee to look for any evidence of avascular necrosis.  We will then plan to see her back in clinic so we can do a formal physical exam with the results of the MRI.      Video-Visit Details    Type of service:  Video Visit    Video Start Time: 1025  Video End Time: 1035    Originating Location (pt. Location): Home    Distant Location (provider location):  [facility]     Platform used for Video Visit: Clarisa Johnson MD

## 2020-06-29 NOTE — PROGRESS NOTES
"Jennifer Cervantes is a 20 year old female who is being evaluated via a billable video visit.      The patient has been notified of following:     \"This video visit will be conducted via a call between you and your physician/provider. We have found that certain health care needs can be provided without the need for an in-person physical exam.  This service lets us provide the care you need with a video conversation.  If a prescription is necessary we can send it directly to your pharmacy.  If lab work is needed we can place an order for that and you can then stop by our lab to have the test done at a later time.    Video visits are billed at different rates depending on your insurance coverage.  Please reach out to your insurance provider with any questions.    If during the course of the call the physician/provider feels a video visit is not appropriate, you will not be charged for this service.\"    Patient has given verbal consent for Video visit? Yes  How would you like to obtain your AVS? Leonarda  Patient would like the video invitation sent by: Text to cell phone: [phone]  Will anyone else be joining your video visit? No      CHIEF CONCERN: Left knee pain    HISTORY:   Very pleasant 20-year-old female.  Has a long history guarding the left knee.  No specific injury.  Never hurts in her right side.  She is a past medical history for sickle cell disease with sickling episodes in the past.  She has not had an MRI of her knee recently.  She obtained x-rays in preparation of this visit.  She states that it bothers her on a near daily basis.  She also has some pain with flexion extension of her knee.  Thank you    PAST MEDICAL HISTORY: (Reviewed with the patient and in the [facility] medical record)  1. Sickle cell disease    PAST SURGICAL HISTORY: (Reviewed with the patient and in the [facility] medical record)  1. No knee surgery    MEDICATIONS: (Reviewed with the patient and in the [facility] medical record)    Notable medications " include: It does appear that she taking OxyContin and oxycodone from review of the medical records    ALLERGIES: (Reviewed with the patient and in the [facility] medical record)  1. Multiple allergies are listed with in Psych[facility]      SOCIAL HISTORY: (Reviewed with the patient and in the medical record)  --Tobacco: Non-smoker  --Avocation/Sport: She would like to be more active than she was but her knee pain limits her    FAMILY HISTORY: (Reviewed with the patient and in the medical record)  -- No family history of bleeding, clotting, or difficulty with anesthesia    REVIEW OF SYSTEMS: (Reviewed with the patient and on the health intake form)  -- A comprehensive 10 point review of systems was conducted and is negative except as noted in the HPI    EXAM:     General: Awake, Alert and Oriented, No acute Distress. Articulate and Interactive    Body mass index is 25.75 kg/m .    Left lower extremity :    Skin is Warm and Well perfused, no suggestion of infection by patient's report    No specific examination was completed today as this was a video visit    EHL/FHL/TA/GS 5/5    Sensation intact L3-S1    2+ Dorsalis Pedis Pulse    IMAGING:    Plain Radiographs: Plain radiographs obtained in preparation of this visit show overall well-preserved joint space no fractures dislocations    MRI: None    ASSESSMENT:  1. Left knee pain in the setting of sickle cell disease    PLAN:  1. At this time I think is important that we get an MRI of her knee to look for any evidence of avascular necrosis.  We will then plan to see her back in clinic so we can do a formal physical exam with the results of the MRI.      Video-Visit Details    Type of service:  Video Visit    Video Start Time: 1025  Video End Time: 1035    Originating Location (pt. Location): Home    Distant Location (provider location):  [facility] ORTHOPAEDIC CLINIC     Platform used for Video Visit: Clarisa Johnson MD

## 2020-06-29 NOTE — NURSING NOTE
Reason For Visit:   Chief Complaint   Patient presents with     Consult     left knee pain and swelling // no recent falls or injuries to it       Wt 68 kg (150 lb)   BMI 25.75 kg/m      Pain Assessment  Patient Currently in Pain: Yes  0-10 Pain Scale: 7  Primary Pain Location: Knee(left knee)    Leanne Walton, ATC

## 2020-06-30 ENCOUNTER — TELEPHONE (OUTPATIENT)
Dept: ORTHOPEDICS | Facility: CLINIC | Age: 20
End: 2020-06-30

## 2020-06-30 NOTE — TELEPHONE ENCOUNTER
----- Message from Sonali Foster RN sent at 6/29/2020 11:19 AM CDT -----  Regarding: MRI and follow up visit  Please help patient schedule MRI of her left knee (order in) with an in-clinic visit with Dr. Johnson after it has been completed.    THanks!    Sonali

## 2020-07-01 DIAGNOSIS — D57.1 HB-SS DISEASE WITHOUT CRISIS (H): ICD-10-CM

## 2020-07-01 DIAGNOSIS — D57.00 SICKLE CELL CRISIS (H): ICD-10-CM

## 2020-07-01 RX ORDER — OXYCODONE HYDROCHLORIDE 10 MG/1
10 TABLET, FILM COATED, EXTENDED RELEASE ORAL EVERY 12 HOURS
Qty: 60 TABLET | Refills: 0 | Status: SHIPPED | OUTPATIENT
Start: 2020-07-01 | End: 2020-07-30

## 2020-07-01 RX ORDER — OXYCODONE HYDROCHLORIDE 10 MG/1
10 TABLET ORAL EVERY 8 HOURS PRN
Qty: 60 TABLET | Refills: 0 | Status: SHIPPED | OUTPATIENT
Start: 2020-07-01 | End: 2020-07-17

## 2020-07-01 NOTE — TELEPHONE ENCOUNTER
Refill Request    Date of most recent appointment:  6/19/20  Next upcoming appointment:   7/17/20    Medication requested:  oxyCODONE IR (ROXICODONE) 10 MG tablet  &  OXYCONTIN 10 MG 12 hr tablet     Quantity:  60, 60  Last fill date:  6/12, 5/29  Person requesting refill:  Pt  Notes:  Oxycodone 2 pills left, taking 1 q 8 h, Oxycontin zero pills left, taking 1 q 12 h. States pain well managed.  shows;      Prescribing provider(s):  Dr Duncan  Refill approved/denied:  Approved    Disposition of prescription:  [id number] - [facility] - [address] AT [facility] [address]    Called Pt to notify x2, no answer, no ID on VM.

## 2020-07-02 ENCOUNTER — TELEPHONE (OUTPATIENT)
Dept: ORTHOPEDICS | Facility: CLINIC | Age: 20
End: 2020-07-02

## 2020-07-02 NOTE — TELEPHONE ENCOUNTER
Spoke with pt. Gave her imaging scheduling number and clinic number for when she schedules her MRI    ----- Message from Sonali Foster RN sent at 6/29/2020 11:19 AM CDT -----  Regarding: MRI and follow up visit  Please help patient schedule MRI of her left knee (order in) with an in-clinic visit with Dr. Johnson after it has been completed.    THanks!    Sonali

## 2020-07-15 ENCOUNTER — TELEPHONE (OUTPATIENT)
Dept: OPHTHALMOLOGY | Facility: CLINIC | Age: 20
End: 2020-07-15

## 2020-07-15 NOTE — TELEPHONE ENCOUNTER
Left message with direct number    Ok for general/continuity/optometry per review of message/referral  Jose Manuel Teixeira RN 1:24 PM 07/16/20    --    Left message with direct number at [phone]  Jose Manuel Teixeira RN 9:45 AM 07/16/20         [facility] Call Center    Phone Message    May a detailed message be left on voicemail: yes     Reason for Call: Appointment Intake    Referring Provider Name: Eric Duncan MD  Diagnosis and/or Symptoms: Iron overload due to repeated red blood cell transfusions  I was not sure what MD to schedule with, Please call Pt to discuss  Thank you,    Action Taken: Message routed to:  [facility] eye    Travel Screening: Not Applicable

## 2020-07-16 ENCOUNTER — VIRTUAL VISIT (OUTPATIENT)
Dept: TRANSPLANT | Facility: CLINIC | Age: 20
End: 2020-07-16
Attending: PEDIATRICS
Payer: COMMERCIAL

## 2020-07-16 ENCOUNTER — TELEPHONE (OUTPATIENT)
Dept: ONCOLOGY | Facility: CLINIC | Age: 20
End: 2020-07-16

## 2020-07-16 DIAGNOSIS — D57.1 HB-SS DISEASE WITHOUT CRISIS (H): Primary | ICD-10-CM

## 2020-07-16 NOTE — PROGRESS NOTES
Difficulty maintaining connection for video visit and unable to contact the patient a second time. Will reschedule.

## 2020-07-16 NOTE — NURSING NOTE
"Jennifer Cervantes is a 20 year old female who is being evaluated via a billable video visit.      The patient has been notified of following:     \"This video visit will be conducted via a call between you and your physician/provider. We have found that certain health care needs can be provided without the need for an in-person physical exam.  This service lets us provide the care you need with a video conversation.  If a prescription is necessary we can send it directly to your pharmacy.  If lab work is needed we can place an order for that and you can then stop by our lab to have the test done at a later time.    If during the course of the call the physician/provider feels a video visit is not appropriate, you will not be charged for this service.\"     Patient has given verbal consent for Video visit? Yes    Patient would like the video invitation sent by: Text to cell phone: [phone]    Video Start Time: 11:00    Jennifer Cervantes complains of    Chief Complaint   Patient presents with     Consult     New consult visit       0 pain      I have reviewed and updated the patient's Past Medical History, Social History, Family History and Medication List.    ALLERGIES  Fish-derived products; Seafood; Contrast dye; Diagnostic x-ray materials; and Gadolinium    "

## 2020-07-16 NOTE — TELEPHONE ENCOUNTER
Pt called in to triage requesting IVF pain meds for back and bilateral arm pain x1 week. Stated typical for her sickle cell crisis, sharp and persistent pain. Denied swelling of joints, fevers, chills, cough, chest pain or sob. Taking oxy every 8 hours, oxycontin every 12 hours, last dose at 5 am without relief. State she can be here at 11 am. Paged Dr. Duncan. Has follow-up visit tomorrow, of note was scheduled for BMT consult today at 11 am with labs now at 10 am but did not mention on the phone.    Per Dr. Duncan: would like pt to keep BMT consult today, ok to schedule IVF pain meds for tomorrow, labs prior and he will see pt in clinic tomorrow. Ok for pt to take oxy every 6 hours for the next 24 hours until seen in clinic, may refill if pt is running low.    Called pt and relayed information, informed her to keep video/phone visits today starting 11 am, if her pain is unmanageable at home and she ends up going to the ED she should call to cancel apts, she verbalized understanding, she stated she will count her pills and call writer back if she has less than 6 pills at home. Pt scheduled for [facility] labs 7/17 at 12:30 pm, Dr. Duncan switched to in-clinic at 1 pm and [facility] charge asked for call back for infusion in the morning.   No significant past surgical history

## 2020-07-17 ENCOUNTER — HOSPITAL ENCOUNTER (EMERGENCY)
Facility: CLINIC | Age: 20
Discharge: LEFT BEFORE DISCHARGE | End: 2020-07-17
Attending: EMERGENCY MEDICINE
Payer: COMMERCIAL

## 2020-07-17 ENCOUNTER — PATIENT OUTREACH (OUTPATIENT)
Dept: ONCOLOGY | Facility: CLINIC | Age: 20
End: 2020-07-17

## 2020-07-17 ENCOUNTER — VIRTUAL VISIT (OUTPATIENT)
Dept: ONCOLOGY | Facility: CLINIC | Age: 20
End: 2020-07-17
Attending: PEDIATRICS
Payer: COMMERCIAL

## 2020-07-17 VITALS
SYSTOLIC BLOOD PRESSURE: 130 MMHG | TEMPERATURE: 98.1 F | RESPIRATION RATE: 16 BRPM | OXYGEN SATURATION: 94 % | HEART RATE: 96 BPM | WEIGHT: 152.5 LBS | BODY MASS INDEX: 26.18 KG/M2 | DIASTOLIC BLOOD PRESSURE: 81 MMHG

## 2020-07-17 VITALS
RESPIRATION RATE: 14 BRPM | HEART RATE: 102 BPM | DIASTOLIC BLOOD PRESSURE: 85 MMHG | WEIGHT: 152 LBS | OXYGEN SATURATION: 92 % | HEIGHT: 64 IN | TEMPERATURE: 98.3 F | SYSTOLIC BLOOD PRESSURE: 123 MMHG | BODY MASS INDEX: 25.95 KG/M2

## 2020-07-17 VITALS
OXYGEN SATURATION: 94 % | RESPIRATION RATE: 16 BRPM | HEART RATE: 96 BPM | SYSTOLIC BLOOD PRESSURE: 130 MMHG | BODY MASS INDEX: 26.18 KG/M2 | TEMPERATURE: 98.1 F | DIASTOLIC BLOOD PRESSURE: 81 MMHG | WEIGHT: 152.5 LBS

## 2020-07-17 DIAGNOSIS — D57.1 HB-SS DISEASE WITHOUT CRISIS (H): Primary | ICD-10-CM

## 2020-07-17 DIAGNOSIS — D57.1 HB-SS DISEASE WITHOUT CRISIS (H): ICD-10-CM

## 2020-07-17 DIAGNOSIS — D57.00 SICKLE CELL CRISIS (H): ICD-10-CM

## 2020-07-17 LAB
ALBUMIN SERPL-MCNC: 4.1 G/DL (ref 3.4–5)
ALP SERPL-CCNC: 73 U/L (ref 40–150)
ALT SERPL W P-5'-P-CCNC: 91 U/L (ref 0–50)
ANION GAP SERPL CALCULATED.3IONS-SCNC: 8 MMOL/L (ref 3–14)
ANISOCYTOSIS BLD QL SMEAR: ABNORMAL
AST SERPL W P-5'-P-CCNC: 102 U/L (ref 0–45)
BASOPHILS # BLD AUTO: 0.1 10E9/L (ref 0–0.2)
BASOPHILS NFR BLD AUTO: 0.9 %
BILIRUB SERPL-MCNC: 5.1 MG/DL (ref 0.2–1.3)
BUN SERPL-MCNC: 6 MG/DL (ref 7–30)
CALCIUM SERPL-MCNC: 8.9 MG/DL (ref 8.5–10.1)
CHLORIDE SERPL-SCNC: 109 MMOL/L (ref 94–109)
CO2 SERPL-SCNC: 22 MMOL/L (ref 20–32)
CREAT SERPL-MCNC: 0.59 MG/DL (ref 0.52–1.04)
DIFFERENTIAL METHOD BLD: ABNORMAL
EOSINOPHIL # BLD AUTO: 0.3 10E9/L (ref 0–0.7)
EOSINOPHIL NFR BLD AUTO: 2.6 %
ERYTHROCYTE [DISTWIDTH] IN BLOOD BY AUTOMATED COUNT: 24.7 % (ref 10–15)
FERRITIN SERPL-MCNC: ABNORMAL NG/ML (ref 12–150)
GFR SERPL CREATININE-BSD FRML MDRD: >90 ML/MIN/{1.73_M2}
GLUCOSE SERPL-MCNC: 85 MG/DL (ref 70–99)
HCT VFR BLD AUTO: 21.7 % (ref 35–47)
HGB BLD-MCNC: 7.9 G/DL (ref 11.7–15.7)
LYMPHOCYTES # BLD AUTO: 2.4 10E9/L (ref 0.8–5.3)
LYMPHOCYTES NFR BLD AUTO: 18.3 %
MCH RBC QN AUTO: 32.6 PG (ref 26.5–33)
MCHC RBC AUTO-ENTMCNC: 36.4 G/DL (ref 31.5–36.5)
MCV RBC AUTO: 90 FL (ref 78–100)
MONOCYTES # BLD AUTO: 0.5 10E9/L (ref 0–1.3)
MONOCYTES NFR BLD AUTO: 3.5 %
NEUTROPHILS # BLD AUTO: 9.9 10E9/L (ref 1.6–8.3)
NEUTROPHILS NFR BLD AUTO: 74.7 %
NRBC # BLD AUTO: 1.3 10*3/UL
NRBC BLD AUTO-RTO: 10 /100
PLATELET # BLD AUTO: 263 10E9/L (ref 150–450)
PLATELET # BLD EST: ABNORMAL 10*3/UL
POIKILOCYTOSIS BLD QL SMEAR: ABNORMAL
POLYCHROMASIA BLD QL SMEAR: SLIGHT
POTASSIUM SERPL-SCNC: 3.8 MMOL/L (ref 3.4–5.3)
PROT SERPL-MCNC: 7.7 G/DL (ref 6.8–8.8)
RBC # BLD AUTO: 2.42 10E12/L (ref 3.8–5.2)
RETICS # AUTO: 742.2 10E9/L (ref 25–95)
RETICS/RBC NFR AUTO: 30.7 % (ref 0.5–2)
SICKLE CELLS BLD QL SMEAR: ABNORMAL
SODIUM SERPL-SCNC: 139 MMOL/L (ref 133–144)
TARGETS BLD QL SMEAR: SLIGHT
WBC # BLD AUTO: 13.2 10E9/L (ref 4–11)

## 2020-07-17 PROCEDURE — 36591 DRAW BLOOD OFF VENOUS DEVICE: CPT

## 2020-07-17 PROCEDURE — 80053 COMPREHEN METABOLIC PANEL: CPT | Performed by: PEDIATRICS

## 2020-07-17 PROCEDURE — 82728 ASSAY OF FERRITIN: CPT | Performed by: PEDIATRICS

## 2020-07-17 PROCEDURE — 96374 THER/PROPH/DIAG INJ IV PUSH: CPT

## 2020-07-17 PROCEDURE — 85025 COMPLETE CBC W/AUTO DIFF WBC: CPT | Performed by: PEDIATRICS

## 2020-07-17 PROCEDURE — 96361 HYDRATE IV INFUSION ADD-ON: CPT

## 2020-07-17 PROCEDURE — 99285 EMERGENCY DEPT VISIT HI MDM: CPT | Mod: 25

## 2020-07-17 PROCEDURE — G0463 HOSPITAL OUTPT CLINIC VISIT: HCPCS | Mod: ZF

## 2020-07-17 PROCEDURE — 25000128 H RX IP 250 OP 636: Performed by: PEDIATRICS

## 2020-07-17 PROCEDURE — 83020 HEMOGLOBIN ELECTROPHORESIS: CPT | Performed by: PEDIATRICS

## 2020-07-17 PROCEDURE — 99285 EMERGENCY DEPT VISIT HI MDM: CPT | Mod: Z6 | Performed by: EMERGENCY MEDICINE

## 2020-07-17 PROCEDURE — 85045 AUTOMATED RETICULOCYTE COUNT: CPT | Performed by: PEDIATRICS

## 2020-07-17 PROCEDURE — 85660 RBC SICKLE CELL TEST: CPT | Performed by: PEDIATRICS

## 2020-07-17 PROCEDURE — 83021 HEMOGLOBIN CHROMOTOGRAPHY: CPT | Performed by: PEDIATRICS

## 2020-07-17 PROCEDURE — 96375 TX/PRO/DX INJ NEW DRUG ADDON: CPT

## 2020-07-17 PROCEDURE — 96376 TX/PRO/DX INJ SAME DRUG ADON: CPT

## 2020-07-17 RX ORDER — OXYCODONE HYDROCHLORIDE 10 MG/1
TABLET ORAL
Qty: 60 TABLET | Refills: 0 | Status: SHIPPED | OUTPATIENT
Start: 2020-07-17 | End: 2020-07-17

## 2020-07-17 RX ORDER — OXYCODONE HYDROCHLORIDE 10 MG/1
TABLET ORAL
Qty: 60 TABLET | Refills: 0 | Status: SHIPPED | OUTPATIENT
Start: 2020-07-17 | End: 2020-08-17

## 2020-07-17 RX ORDER — HEPARIN SODIUM (PORCINE) LOCK FLUSH IV SOLN 100 UNIT/ML 100 UNIT/ML
5 SOLUTION INTRAVENOUS DAILY PRN
Status: DISCONTINUED | OUTPATIENT
Start: 2020-07-17 | End: 2020-07-25 | Stop reason: HOSPADM

## 2020-07-17 RX ADMIN — Medication 5 ML: at 13:12

## 2020-07-17 ASSESSMENT — MIFFLIN-ST. JEOR
SCORE: 1439.47
SCORE: 1439.47

## 2020-07-17 ASSESSMENT — PAIN SCALES - GENERAL: PAINLEVEL: EXTREME PAIN (9)

## 2020-07-17 NOTE — NURSING NOTE
"Oncology Rooming Note    July 17, 2020 1:35 PM   Jennifer Cervantes is a 20 year old female who presents for:    Chief Complaint   Patient presents with     Oncology Clinic Visit     SICKLE CELL ANEMIA      Initial Vitals: /81   Pulse 96   Temp 98.1  F (36.7  C) (Oral)   Resp 16   Wt 69.2 kg (152 lb 8 oz)   SpO2 94%   BMI 26.18 kg/m   Estimated body mass index is 26.18 kg/m  as calculated from the following:    Height as of 4/13/20: 1.626 m (5' 4\").    Weight as of this encounter: 69.2 kg (152 lb 8 oz). Body surface area is 1.77 meters squared.  Extreme Pain (9) Comment: Data Unavailable   No LMP recorded. Patient has had an injection.  Allergies reviewed: Yes  Medications reviewed: Yes    Medications: Medication refills not needed today.  Pharmacy name entered into EPIC:    [facility] - [place], [place] - [address] [id number] - [facility] [address] OF [place] [place] [facility] [phone]  [id number] [address] AT [id number] [address] [facility] [address] [facility] [address] [facility]    Clinical concerns: No new concerns today  Dr Duncan was NOT notified.      Dee Dee Lee              "

## 2020-07-17 NOTE — LETTER
7/17/2020         RE: Jennifer Cervantes  [address]  [place] [address]        Dear Colleague,    Thank you for referring your patient, Jennifer Cervantes, to the [facility]. Please see a copy of my visit note below.    Patient had to leave without being seen. Will try to reschedule for next week.    Again, thank you for allowing me to participate in the care of your patient.        Sincerely,        Eric Duncan MD

## 2020-07-17 NOTE — NURSING NOTE
Chief Complaint   Patient presents with     Port Draw     Labs drawn via port by RN in lab. VS taken.      Labs drawn via Port accessed using 20g gripper needle. Line flushed and Heparin locked. Vital signs taken. Checked into next appointment.       Jennifer Lai RN

## 2020-07-17 NOTE — PROGRESS NOTES
Writer placed call to patient to discuss missed appointment today and offer telephone visit with Dr Duncan for Monday late morning-early afternoon or Tuesday morning. Unable to reach patient, writer left generic VM as new numbers are listed with no name on greeting. Call back information and request provided via .

## 2020-07-17 NOTE — ED TRIAGE NOTES
Patient presents to triage c/o sickle cell pain in bilateral legs, arms, and lower back. Pain has been present for 2-3 weeks but has gotten worse over the past few days. She was at the infusion clinic earlier today but left before treatment due to a long wait time. She has oxycodone and oxycontin at home but is currently out of her oxycodone.

## 2020-07-17 NOTE — NURSING NOTE
Chief Complaint   Patient presents with     Port Draw     Port deaccessed by RN in lab.      Jennifer Lai RN

## 2020-07-17 NOTE — PROGRESS NOTES
"Writer received call from patient who was audibly upset and crying over delays in appointment combined with increased pain. Patient has been taking short-acting 10mg oxycodone q 8 hours as directed with little to no relief in pain. Writer reviewed that if after 2-3 doses she is not finding adequate relief in pain, she needs to call the clinic to speak with clinic triage, after-hours nurse advice line, or call writer directly. Patient was frustrated with process and left clinic without completing visit with provider. Writer advised that refill of medication would be sent to preferred pharmacy in [place] with more specific instructions. Patient stated that she did not want an offered virtual visit with care team for Monday or Tuesday of next week, writer will revisit next week once pain is managed better. Writer also encouraged patient to call direct line previously provided to patient when care received is not in keeping with expectations so we can resolve issues together. Jennifer voiced understanding of this and said that she has to go to ER. During recap of conversation, Jennifer stated \"Hello\" and call was disconnected. Writer reviewed plan with Dr Duncan who is in verbal agreement.  "

## 2020-07-18 ENCOUNTER — HOSPITAL ENCOUNTER (EMERGENCY)
Facility: CLINIC | Age: 20
Discharge: HOME | End: 2020-07-18
Attending: EMERGENCY MEDICINE | Admitting: EMERGENCY MEDICINE
Payer: COMMERCIAL

## 2020-07-18 VITALS
DIASTOLIC BLOOD PRESSURE: 74 MMHG | BODY MASS INDEX: 25.95 KG/M2 | TEMPERATURE: 98.4 F | WEIGHT: 152 LBS | HEART RATE: 69 BPM | HEIGHT: 64 IN | OXYGEN SATURATION: 100 % | RESPIRATION RATE: 14 BRPM | SYSTOLIC BLOOD PRESSURE: 108 MMHG

## 2020-07-18 DIAGNOSIS — D57.00 SICKLE CELL PAIN CRISIS (H): ICD-10-CM

## 2020-07-18 PROCEDURE — 25000132 ZZH RX MED GY IP 250 OP 250 PS 637: Performed by: EMERGENCY MEDICINE

## 2020-07-18 PROCEDURE — 25000128 H RX IP 250 OP 636: Performed by: EMERGENCY MEDICINE

## 2020-07-18 PROCEDURE — 96375 TX/PRO/DX INJ NEW DRUG ADDON: CPT

## 2020-07-18 PROCEDURE — 96374 THER/PROPH/DIAG INJ IV PUSH: CPT

## 2020-07-18 PROCEDURE — 25000125 ZZHC RX 250: Performed by: EMERGENCY MEDICINE

## 2020-07-18 PROCEDURE — 25800025 ZZH RX 258: Performed by: EMERGENCY MEDICINE

## 2020-07-18 PROCEDURE — 96376 TX/PRO/DX INJ SAME DRUG ADON: CPT

## 2020-07-18 RX ORDER — HEPARIN SODIUM (PORCINE) LOCK FLUSH IV SOLN 100 UNIT/ML 100 UNIT/ML
5 SOLUTION INTRAVENOUS ONCE
Status: COMPLETED | OUTPATIENT
Start: 2020-07-18 | End: 2020-07-18

## 2020-07-18 RX ORDER — MORPHINE SULFATE 2 MG/ML
2 INJECTION, SOLUTION INTRAMUSCULAR; INTRAVENOUS
Status: COMPLETED | OUTPATIENT
Start: 2020-07-18 | End: 2020-07-18

## 2020-07-18 RX ORDER — HEPARIN SODIUM (PORCINE) LOCK FLUSH IV SOLN 100 UNIT/ML 100 UNIT/ML
100 SOLUTION INTRAVENOUS ONCE
Status: DISCONTINUED | OUTPATIENT
Start: 2020-07-18 | End: 2020-07-18

## 2020-07-18 RX ORDER — KETOROLAC TROMETHAMINE 30 MG/ML
30 INJECTION, SOLUTION INTRAMUSCULAR; INTRAVENOUS ONCE
Status: COMPLETED | OUTPATIENT
Start: 2020-07-18 | End: 2020-07-18

## 2020-07-18 RX ORDER — DIPHENHYDRAMINE HCL 25 MG
25 CAPSULE ORAL ONCE
Status: COMPLETED | OUTPATIENT
Start: 2020-07-18 | End: 2020-07-18

## 2020-07-18 RX ORDER — ONDANSETRON 2 MG/ML
4 INJECTION INTRAMUSCULAR; INTRAVENOUS EVERY 30 MIN PRN
Status: DISCONTINUED | OUTPATIENT
Start: 2020-07-18 | End: 2020-07-18 | Stop reason: HOSPADM

## 2020-07-18 RX ORDER — HEPARIN SODIUM (PORCINE) LOCK FLUSH IV SOLN 100 UNIT/ML 100 UNIT/ML
5 SOLUTION INTRAVENOUS
Status: DISCONTINUED | OUTPATIENT
Start: 2020-07-18 | End: 2020-07-18

## 2020-07-18 RX ADMIN — MORPHINE SULFATE 2 MG: 2 INJECTION, SOLUTION INTRAMUSCULAR; INTRAVENOUS at 00:29

## 2020-07-18 RX ADMIN — MORPHINE SULFATE 2 MG: 2 INJECTION, SOLUTION INTRAMUSCULAR; INTRAVENOUS at 04:53

## 2020-07-18 RX ADMIN — DIPHENHYDRAMINE HYDROCHLORIDE 25 MG: 25 CAPSULE ORAL at 00:35

## 2020-07-18 RX ADMIN — ONDANSETRON 4 MG: 2 INJECTION INTRAMUSCULAR; INTRAVENOUS at 00:30

## 2020-07-18 RX ADMIN — Medication 5 ML: at 05:49

## 2020-07-18 RX ADMIN — MORPHINE SULFATE 2 MG: 2 INJECTION, SOLUTION INTRAMUSCULAR; INTRAVENOUS at 03:08

## 2020-07-18 RX ADMIN — KETOROLAC TROMETHAMINE 30 MG: 30 INJECTION, SOLUTION INTRAMUSCULAR at 00:30

## 2020-07-18 RX ADMIN — DEXTROSE AND SODIUM CHLORIDE: 5; 450 INJECTION, SOLUTION INTRAVENOUS at 00:35

## 2020-07-18 NOTE — ED AVS SNAPSHOT
[facility], [place], Emergency Department  [address] [place] [address]  Phone:  [phone]                                    Jennifer Cervantes   MRN: [id number]    Department:  [facility], [place], Emergency Department   Date of Visit:  7/17/2020           After Visit Summary Signature Page    I have received my discharge instructions, and my questions have been answered. I have discussed any challenges I see with this plan with the nurse or doctor.    ..........................................................................................................................................  Patient/Patient Representative Signature      ..........................................................................................................................................  Patient Representative Print Name and Relationship to Patient    ..................................................               ................................................  Date                                   Time    ..........................................................................................................................................  Reviewed by Signature/Title    ...................................................              ..............................................  Date                                               Time          22EPIC Rev 08/18

## 2020-07-18 NOTE — ED PROVIDER NOTES
"Patient signed out to me by prior attending.  Briefly 20-year-old female with sickle cell disease presenting with what she describes as her sickle cell crisis.  At baseline the patient has right-sided hemiparesis secondary to CVA as well as some cognitive deficits though she is able to cooperate with exam and provide a reliable history.    /74   Pulse 69   Temp 98.4  F (36.9  C) (Oral)   Resp 14   Ht 1.626 m (5' 4\")   Wt 68.9 kg (152 lb)   SpO2 100%   BMI 26.09 kg/m        Following execution of her pain plan she feels significantly improved and on reassessment at 5:30 AM wishes to be discharged.  Patient just had laboratories collected yesterday at 1 PM and these were unrevealing and consistent with sickle cell crisis with elevated reticulocyte count and a hemoglobin of 7.9 near her baseline.  No further lab studies thus were ordered today.        She is appropriate for discharge as previously planned.  All questions answered.  Patient comfortable with the plan.    - Patient agrees to our plan and is ready and eager for discharge. Care plan, follow up plan, and reasons to return immediately to the ED were dicussed in detail and summarized as noted in the discharge instructions.           Doe Schmid MD  07/18/20 0538    "

## 2020-07-18 NOTE — ED TRIAGE NOTES
Patient presents A&Ox4 and ambulatory to triage c/o sickle cell pain crisis. Patient states she went to clinic and an emergency room earlier today, but felt she was waiting too long and went home.

## 2020-07-18 NOTE — ED PROVIDER NOTES
[facility] EMERGENCY DEPARTMENT ([facility])  July 18, 2020 ED 17  History     Chief Complaint   Patient presents with     Sickle Cell Pain Crisis     The history is provided by the patient and medical records.     Jennifer Cervantes is a 20 year old female with prior history of CVA with right-sided hemiparesis, cognitive delays who presents with sickle cell pain flare.  Patient has been trying to manage her pain as an outpatient, has been prescribed short acting 10 mg oxycodone every 8 hours and has been taking this as directed with out any relief in pain.  She has been in contact with clinic, was offered virtual visits but had difficulties connecting with this earlier this week.  She has been struggling with her pain, tried to get into infusion clinic but was unable to.  She tried to come to the emergency department earlier today but there was a significant weight and so she left without being seen and now returns for ongoing pain.    Recent Labs   Lab Test 07/21/20  0005 07/17/20  1303 06/19/20  1213 06/17/20  1510 05/21/20  1118  05/12/20  1148  04/08/20  0641  03/30/20  1443  03/13/20  1425    139 139 139  --    < > 139   < > 138   < > 137   < > 138   POTASSIUM 4.0 3.8 3.7 3.8  --    < > 3.8   < > 3.7   < > 3.5   < > 3.8   CHLORIDE 109 109 109 110*  --    < > 109   < > 106   < > 107   < > 110*   CO2 24 22 23 24  --    < > 25   < > 26   < > 23   < > 22   BUN 11 6* 6* 6*  --    < > 7   < > 2*   < > 10   < > 8   CR 0.66 0.59 0.50* 0.55  --    < > 0.46*   < > 0.61   < > 0.54   < > 0.50*   MICAH 8.4* 8.9 8.6 8.8  --    < > 9.0   < > 8.3*   < > 8.8   < > 8.8   PROTTOTAL 7.1 7.7 7.8 8.1  --    < > 7.5   < > 6.3*   < > 7.6   < > 8.1   ALBUMIN 3.9 4.1 4.0 4.4  --    < > 3.9   < > 3.1*   < > 4.0   < > 4.3   AST 99* 102* 61* 59*  --    < > 60*   < > 99*   < > 88*   < > 49*   ALT 82* 91* 53* 52*  --    < > 58*   < > 107*   < > 90*   < > 54*   GLC 89 85 82 79  --    < > 81   < > 92   < > 104*   < > 87   WBC 12.8*  13.2* 13.4* 13.7*  --    < > 14.2*   < > 15.7*   < > 18.1*   < > 16.2*   HGB 7.4* 7.9* 8.3* 8.3*  --    < > 8.3*   < > 7.9*   < > 7.9*   < > 9.4*   MCV 92 90 92 89  --    < > 89   < > 89   < > 87   < > 94    263 309 311  --    < > 290   < > 146*   < > 306   < > 246   INR  --   --   --   --   --   --   --   --  1.31*   < >  --   --   --    SALMA  --  14,564* 11,514* 11,212* 10,912*  --  9,688*  --   --   --  15,538*  --  11,732*    < > = values in this interval not displayed.       No lab results found.    PAST MEDICAL HISTORY:   Past Medical History:   Diagnosis Date     Anemia      Anxiety      Bleeding disorder (H)      Blood clotting disorder (H)      Cerebral infarction (H) 2015     Cognitive developmental delay     low IQ. 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 (H)     right hand contractures     Iron overload due to repeated red blood cell transfusions      Migraines      Oppositional defiant behavior      Uncomplicated asthma        PAST SURGICAL HISTORY:   Past Surgical History:   Procedure Laterality Date     AS INSERT TUNNELED CV 2 CATH W/O PORT/PUMP       C BREAST REDUCTION (INCLUDES LIPO) TIER 3 Bilateral 04/23/2019     IR CVC NON TUNNEL PLACEMENT  4/7/2020     REPAIR TENDON ELBOW Right 10/2/2019    Procedure: Right Elbow Flexor Lengthening, Flexor Pronator Slide Of Wrist and Finger, Thumb Adductor Lengthening;  Surgeon: Anai Franco MD;  Location: [facility] OR     TONSILLECTOMY Bilateral 10/2/2019    Procedure: Bilateral Tonsillectomy;  Surgeon: Farhana Guy MD;  Location: [facility] OR       Past medical history, past surgical history, medications, and allergies were reviewed with the patient. Additional pertinent items: None    FAMILY HISTORY:   Family History   Problem Relation Age of Onset     Sickle Cell Trait Mother      Sickle Cell Trait Father         SOCIAL HISTORY:   Social History     Tobacco Use     Smoking status: Never Smoker     Smokeless tobacco: Never Used   Substance Use Topics     Alcohol use: Not Currently     Alcohol/week: 0.0 standard drinks     Social history was reviewed with the patient. Additional pertinent items: None      Discharge Medication List as of 7/18/2020  5:38 AM      CONTINUE these medications which have NOT CHANGED    Details   acetaminophen (TYLENOL) 325 MG tablet Take 2 tablets (650 mg) by mouth every 6 hours as needed for mild pain, Historical      albuterol (PROVENTIL) (2.5 MG/3ML) 0.083% neb solution Take 1 vial (2.5 mg) by nebulization every 6 hours as needed for shortness of breath / dyspnea or wheezing, Disp-12 mL,R-4, E-Prescribe      aspirin (ASPIRIN) 81 MG EC tablet Take 1 tablet (81 mg) by mouth daily, Disp-30 tablet,R-4, E-Prescribe      Budesonide-Formoterol Fumarate (SYMBICORT IN) Inhale  into the lungs., Inhalation, Until Discontinued, Historical      celecoxib (CELEBREX) 100 MG capsule Take 1 capsule (100 mg) by mouth 2 times daily, Disp-60 capsule,R-3, E-Prescribe      diphenhydrAMINE (BENADRYL) 25 MG capsule Take 1-2 capsules (25-50 mg) by mouth nightly as needed for sleep, Disp-60 capsule,R-3, E-Prescribe      EPINEPHrine (EPIPEN) 0.3 MG/0.3ML injection Historical      erythromycin ethylsuccinate (E.E.S.) 400 MG tablet Take 1 tablet (400 mg) by mouth 3 times daily Before meals, Historical      gabapentin (NEURONTIN) 300 MG capsule Take 3 capsules (900 mg) by mouth 3 times daily, Disp-90 capsule,R-1, E-Prescribe      GNP SENNA-LAX 8.6 MG tablet Take 1 tablet by mouth 2 times daily as needed for constipation, SHIELA, Historical      hydroxyurea (HYDREA) 500 MG capsule Take 4 capsules (2,000 mg) by mouth daily, Disp-120 capsule,R-4, E-Prescribe      ibuprofen (ADVIL/MOTRIN) 200 MG tablet Take 3 tablets (600 mg) by mouth every 6 hours as needed for moderate pain, Disp-90 tablet,R-3, E-Prescribe      JADENU 360 MG  "tablet Take 4 tablets (1,440 mg) by mouth daily, Disp-120 tablet,R-4,SHIELA, E-PrescribeCorrected dose to 1440mg daily      medroxyPROGESTERone (DEPO-PROVERA) 150 MG/ML IM injection Inject 150 mg into the muscle, Historical      medroxyPROGESTERone (PROVERA) 2.5 MG tablet Historical      naloxone (NARCAN) 4 MG/0.1ML nasal spray Spray 1 spray (4 mg) into one nostril alternating nostrils once as needed for opioid reversal every 2-3 minutes until assistance arrives, Disp-0.2 mL,R-1, E-Prescribe      omeprazole (PRILOSEC) 40 MG DR capsule Take 1 capsule (40 mg) by mouth daily, Disp-30 capsule,R-3, E-Prescribe      ondansetron (ZOFRAN) 8 MG tablet Historical      oxyCODONE IR (ROXICODONE) 10 MG tablet Take 1 tablet (10mg) by mouth every 8 hours for pain. If after 2 doses not working, call clinic., Disp-60 tablet,R-0, E-Prescribe      OXYCONTIN 10 MG 12 hr tablet Take 1 tablet (10 mg) by mouth every 12 hours, Disp-60 tablet,R-0,SHIELA, E-Prescribe      sertraline (ZOLOFT) 100 MG tablet Take 2 tablets (200 mg) by mouth daily, Disp-30 tablet,R-1, E-Prescribe                Allergies   Allergen Reactions     Fish-Derived Products Hives     Seafood Hives     Contrast Dye      Diagnostic X-Ray Materials      Gadolinium         Review of Systems  A complete review of systems was performed with pertinent positives and negatives noted in the HPI, and all other systems negative.    Physical Exam   BP: (!) 141/61  Pulse: 93  Temp: 98.4  F (36.9  C)  Resp: 14  Height: 162.6 cm (5' 4\")  Weight: 68.9 kg (152 lb)  SpO2: 94 %      Physical Exam  Constitutional:       General: She is not in acute distress.     Appearance: She is well-developed. She is not ill-appearing, toxic-appearing or diaphoretic.      Comments: Comfortably resting, lying in bed, NAD, nondiaphoretic, lucid, fully conversant, no  respiratory distress, alert and oriented.     HENT:      Head: Normocephalic and atraumatic.      Mouth/Throat:      Pharynx: No oropharyngeal " exudate.   Eyes:      General: No scleral icterus.     Pupils: Pupils are equal, round, and reactive to light.   Neck:      Musculoskeletal: Normal range of motion and neck supple.   Cardiovascular:      Rate and Rhythm: Normal rate.   Pulmonary:      Effort: Pulmonary effort is normal. No respiratory distress.   Abdominal:      Palpations: Abdomen is soft.      Tenderness: There is no abdominal tenderness.   Musculoskeletal:         General: No tenderness.   Skin:     General: Skin is warm and dry.      Coloration: Skin is not pale.      Findings: No erythema or rash.   Neurological:      Mental Status: She is alert and oriented to person, place, and time.         ED Course        Procedures                           No results found for this or any previous visit (from the past 24 hour(s)).  Medications   dextrose 5% and 0.45% NaCl infusion ( Intravenous Stopped 7/18/20 0453)   morphine (PF) injection 2 mg (2 mg Intravenous Given 7/18/20 0453)   diphenhydrAMINE (BENADRYL) capsule 25 mg (25 mg Oral Given 7/18/20 0035)   ketorolac (TORADOL) injection 30 mg (30 mg Intravenous Given 7/18/20 0030)   heparin 100 UNIT/ML injection 5 mL (5 mLs Intracatheter Given 7/18/20 0549)             Assessments & Plan (with Medical Decision Making)   This is a 20-year-old female patient coming into the emergency room with a significant past medical history of sickle cell.  Patient started having crisis earlier today and attempted to go to the infusion clinic was unable to get treatment because of the weight.  She states that she is having her typical pain in her lower back and legs.  She utilized all of her home rescue therapy with no significant improvement.  She denies any concerns for acute chest with no signs of chest pain, shortness of breath, fevers.  She states that this is her classic sickle cell crisis.  At this time patient is stable and receiving her ED Care plan. She is signed out to the evening physician for disposition  planning.      I have reviewed the nursing notes.    I have reviewed the findings, diagnosis, plan and need for follow up with the patient.    Discharge Medication List as of 7/18/2020  5:38 AM          Final diagnoses:   Sickle cell pain crisis (H)       7/17/2020   [facility], [place], EMERGENCY DEPARTMENT     Reddy Contreras MD  07/22/20 6753

## 2020-07-18 NOTE — DISCHARGE INSTRUCTIONS
Please follow-up with your hematologist.  Continue taking your regular medications    Return to the emergency department immediately for any chest pain, back pain, shortness of breath, weakness, abdominal pain nausea, vomiting, or any other concerns as given or discussed

## 2020-07-20 ENCOUNTER — PATIENT OUTREACH (OUTPATIENT)
Dept: ONCOLOGY | Facility: CLINIC | Age: 20
End: 2020-07-20

## 2020-07-20 ENCOUNTER — HOSPITAL ENCOUNTER (EMERGENCY)
Facility: CLINIC | Age: 20
Discharge: HOME | End: 2020-07-21
Attending: FAMILY MEDICINE | Admitting: FAMILY MEDICINE
Payer: COMMERCIAL

## 2020-07-20 DIAGNOSIS — D57.00 SICKLE CELL CRISIS (H): ICD-10-CM

## 2020-07-20 LAB
HGB A1 MFR BLD: 14.5 % (ref 95–97.9)
HGB A2 MFR BLD: 3.5 % (ref 2–3.5)
HGB C MFR BLD: 0 % (ref 0–0)
HGB E MFR BLD: 0 % (ref 0–0)
HGB F MFR BLD: 2.8 % (ref 0–2.1)
HGB FRACT BLD ELPH-IMP: ABNORMAL
HGB OTHER MFR BLD: 0 % (ref 0–0)
HGB S BLD QL SOLY: POSITIVE
HGB S MFR BLD: 79.2 % (ref 0–0)
PATH INTERP BLD-IMP: ABNORMAL

## 2020-07-20 PROCEDURE — 96374 THER/PROPH/DIAG INJ IV PUSH: CPT

## 2020-07-20 PROCEDURE — 99285 EMERGENCY DEPT VISIT HI MDM: CPT | Mod: 25

## 2020-07-20 PROCEDURE — 96361 HYDRATE IV INFUSION ADD-ON: CPT

## 2020-07-20 PROCEDURE — 96375 TX/PRO/DX INJ NEW DRUG ADDON: CPT

## 2020-07-20 PROCEDURE — 25000132 ZZH RX MED GY IP 250 OP 250 PS 637: Performed by: FAMILY MEDICINE

## 2020-07-20 PROCEDURE — 99285 EMERGENCY DEPT VISIT HI MDM: CPT | Mod: Z6 | Performed by: EMERGENCY MEDICINE

## 2020-07-20 RX ORDER — OXYCODONE HYDROCHLORIDE 5 MG/1
10 TABLET ORAL ONCE
Status: COMPLETED | OUTPATIENT
Start: 2020-07-20 | End: 2020-07-20

## 2020-07-20 RX ORDER — SODIUM CHLORIDE 9 MG/ML
INJECTION, SOLUTION INTRAVENOUS CONTINUOUS
Status: DISCONTINUED | OUTPATIENT
Start: 2020-07-21 | End: 2020-07-21 | Stop reason: HOSPADM

## 2020-07-20 RX ORDER — LIDOCAINE 40 MG/G
CREAM TOPICAL
Status: DISCONTINUED | OUTPATIENT
Start: 2020-07-20 | End: 2020-07-21 | Stop reason: HOSPADM

## 2020-07-20 RX ADMIN — OXYCODONE HYDROCHLORIDE 10 MG: 5 TABLET ORAL at 23:47

## 2020-07-20 ASSESSMENT — ENCOUNTER SYMPTOMS
COUGH: 0
DYSURIA: 0

## 2020-07-20 NOTE — ED AVS SNAPSHOT
[facility], [place], Emergency Department  [address] [address]  Phone:  [phone]                                    Jennifer Cervantes   MRN: [id number]    Department:  [facility] [place], Emergency Department   Date of Visit:  7/20/2020           After Visit Summary Signature Page    I have received my discharge instructions, and my questions have been answered. I have discussed any challenges I see with this plan with the nurse or doctor.    ..........................................................................................................................................  Patient/Patient Representative Signature      ..........................................................................................................................................  Patient Representative Print Name and Relationship to Patient    ..................................................               ................................................  Date                                   Time    ..........................................................................................................................................  Reviewed by Signature/Title    ...................................................              ..............................................  Date                                               Time          22EPIC Rev 08/18

## 2020-07-20 NOTE — PROGRESS NOTES
Writer placed call to patient to assess current pain needs and offer appointment via telephone call with Dr Duncan for early this afternoon or tomorrow morning. No answer received, left detailed VM with request for call back.

## 2020-07-20 NOTE — PROGRESS NOTES
"Writer placed return call to patient to assess current pain status and offer IVF/pain med appointment in infusion and telephone visit with Dr Duncan. Jennifer states that her pain is about the same in intensity as it was on Friday. Friday she presented to ED for eval/treatment for back pain r/t SCD but wait time was given as 7-8 hours. Patient was unable to wait that long in waiting area as sitting upright causes discomfort so she went home and returned to ED later for treatment. Was able to find relief at that time and was discharged to home. Today, pain is in her back and states that it so severe that she can \"feel her heartbeat\" and oxycodone IR 10 mg q 8 hours is not providing enough relief to manage at home, last dose was at approximately 1100. Writer offered infusion appointment but Jennifer was opposed to this at this time and is leaning towards returning to ER for eval but will likely wait until evening when she feels they are less busy. Writer advised that she could try taking her next 2 doses of oxycodone IR 10 mg at 6 hours brittany and to set her phone alarm for 1700 and 2300. If after her 1700 dose she is not finding relief, she should proceed to ER for eval/treatment, if she feels that she is getting enough relief, she should take her next dose at 2300 as planned and can come in to clinic for infusion if availability exists. Jennifer was open to this plan and if she finds she needs to go into the ER, she will call writer back and leave  so infusion appointment can be reviewed in morning to determine if she is admitted. If no admission and is in clinic for IVF/pain meds, will attempt to coordinate telephone visit with Dr Duncan. Message sent to charge RN for addition for tomorrow.  "

## 2020-07-20 NOTE — PROGRESS NOTES
Writer placed call to patient to advise of appointment secured in [facility] for tomorrow, July 21, at 1030 for IVF/pain meds and that Dr Duncan will call her between [phone] for telephone visit. No answer received, left detailed VM.

## 2020-07-21 ENCOUNTER — VIRTUAL VISIT (OUTPATIENT)
Dept: ONCOLOGY | Facility: CLINIC | Age: 20
End: 2020-07-21
Attending: PEDIATRICS
Payer: COMMERCIAL

## 2020-07-21 ENCOUNTER — PATIENT OUTREACH (OUTPATIENT)
Dept: ONCOLOGY | Facility: CLINIC | Age: 20
End: 2020-07-21

## 2020-07-21 VITALS
BODY MASS INDEX: 26.09 KG/M2 | HEART RATE: 92 BPM | TEMPERATURE: 99.9 F | SYSTOLIC BLOOD PRESSURE: 127 MMHG | DIASTOLIC BLOOD PRESSURE: 76 MMHG | RESPIRATION RATE: 16 BRPM | WEIGHT: 152 LBS | OXYGEN SATURATION: 94 %

## 2020-07-21 DIAGNOSIS — D57.1 HB-SS DISEASE WITHOUT CRISIS (H): Primary | ICD-10-CM

## 2020-07-21 LAB
ALBUMIN SERPL-MCNC: 3.9 G/DL (ref 3.4–5)
ALP SERPL-CCNC: 68 U/L (ref 40–150)
ALT SERPL W P-5'-P-CCNC: 82 U/L (ref 0–50)
ANION GAP SERPL CALCULATED.3IONS-SCNC: 5 MMOL/L (ref 3–14)
ANISOCYTOSIS BLD QL SMEAR: ABNORMAL
AST SERPL W P-5'-P-CCNC: 99 U/L (ref 0–45)
BASOPHILS # BLD AUTO: 0.2 10E9/L (ref 0–0.2)
BASOPHILS NFR BLD AUTO: 1.7 %
BILIRUB SERPL-MCNC: 4.5 MG/DL (ref 0.2–1.3)
BUN SERPL-MCNC: 11 MG/DL (ref 7–30)
BURR CELLS BLD QL SMEAR: SLIGHT
CALCIUM SERPL-MCNC: 8.4 MG/DL (ref 8.5–10.1)
CHLORIDE SERPL-SCNC: 109 MMOL/L (ref 94–109)
CO2 SERPL-SCNC: 24 MMOL/L (ref 20–32)
CREAT SERPL-MCNC: 0.66 MG/DL (ref 0.52–1.04)
DIFFERENTIAL METHOD BLD: ABNORMAL
ELLIPTOCYTES BLD QL SMEAR: ABNORMAL
EOSINOPHIL # BLD AUTO: 0.3 10E9/L (ref 0–0.7)
EOSINOPHIL NFR BLD AUTO: 2.6 %
ERYTHROCYTE [DISTWIDTH] IN BLOOD BY AUTOMATED COUNT: 25.9 % (ref 10–15)
GFR SERPL CREATININE-BSD FRML MDRD: >90 ML/MIN/{1.73_M2}
GLUCOSE SERPL-MCNC: 89 MG/DL (ref 70–99)
HCT VFR BLD AUTO: 20.7 % (ref 35–47)
HGB BLD-MCNC: 7.4 G/DL (ref 11.7–15.7)
LYMPHOCYTES # BLD AUTO: 3.2 10E9/L (ref 0.8–5.3)
LYMPHOCYTES NFR BLD AUTO: 25 %
MCH RBC QN AUTO: 33 PG (ref 26.5–33)
MCHC RBC AUTO-ENTMCNC: 35.7 G/DL (ref 31.5–36.5)
MCV RBC AUTO: 92 FL (ref 78–100)
MONOCYTES # BLD AUTO: 1 10E9/L (ref 0–1.3)
MONOCYTES NFR BLD AUTO: 7.8 %
MYELOCYTES # BLD: 0.1 10E9/L
MYELOCYTES NFR BLD MANUAL: 0.9 %
NEUTROPHILS # BLD AUTO: 7.9 10E9/L (ref 1.6–8.3)
NEUTROPHILS NFR BLD AUTO: 62 %
NRBC # BLD AUTO: 1.3 10*3/UL
NRBC BLD AUTO-RTO: 10 /100
PLATELET # BLD AUTO: 278 10E9/L (ref 150–450)
PLATELET # BLD EST: ABNORMAL 10*3/UL
POIKILOCYTOSIS BLD QL SMEAR: ABNORMAL
POLYCHROMASIA BLD QL SMEAR: ABNORMAL
POTASSIUM SERPL-SCNC: 4 MMOL/L (ref 3.4–5.3)
PROT SERPL-MCNC: 7.1 G/DL (ref 6.8–8.8)
RBC # BLD AUTO: 2.24 10E12/L (ref 3.8–5.2)
RETICS # AUTO: 624 10E9/L (ref 25–95)
RETICS/RBC NFR AUTO: 28.1 % (ref 0.5–2)
SICKLE CELLS BLD QL SMEAR: SLIGHT
SODIUM SERPL-SCNC: 138 MMOL/L (ref 133–144)
TARGETS BLD QL SMEAR: SLIGHT
WBC # BLD AUTO: 12.8 10E9/L (ref 4–11)

## 2020-07-21 PROCEDURE — 25000128 H RX IP 250 OP 636: Performed by: FAMILY MEDICINE

## 2020-07-21 PROCEDURE — 80053 COMPREHEN METABOLIC PANEL: CPT | Performed by: FAMILY MEDICINE

## 2020-07-21 PROCEDURE — 25800030 ZZH RX IP 258 OP 636: Performed by: FAMILY MEDICINE

## 2020-07-21 PROCEDURE — 85045 AUTOMATED RETICULOCYTE COUNT: CPT | Performed by: FAMILY MEDICINE

## 2020-07-21 PROCEDURE — 25000125 ZZHC RX 250: Performed by: FAMILY MEDICINE

## 2020-07-21 PROCEDURE — 25000128 H RX IP 250 OP 636: Performed by: EMERGENCY MEDICINE

## 2020-07-21 PROCEDURE — 85025 COMPLETE CBC W/AUTO DIFF WBC: CPT | Performed by: FAMILY MEDICINE

## 2020-07-21 RX ORDER — LIDOCAINE 40 MG/G
CREAM TOPICAL
Status: DISCONTINUED | OUTPATIENT
Start: 2020-07-21 | End: 2020-07-21 | Stop reason: HOSPADM

## 2020-07-21 RX ORDER — KETOROLAC TROMETHAMINE 15 MG/ML
15 INJECTION, SOLUTION INTRAMUSCULAR; INTRAVENOUS ONCE
Status: COMPLETED | OUTPATIENT
Start: 2020-07-21 | End: 2020-07-21

## 2020-07-21 RX ORDER — HEPARIN SODIUM (PORCINE) LOCK FLUSH IV SOLN 100 UNIT/ML 100 UNIT/ML
5 SOLUTION INTRAVENOUS
Status: DISCONTINUED | OUTPATIENT
Start: 2020-07-21 | End: 2020-07-21 | Stop reason: HOSPADM

## 2020-07-21 RX ORDER — MORPHINE SULFATE 2 MG/ML
2 INJECTION, SOLUTION INTRAMUSCULAR; INTRAVENOUS ONCE
Status: COMPLETED | OUTPATIENT
Start: 2020-07-21 | End: 2020-07-21

## 2020-07-21 RX ADMIN — Medication 5 ML: at 03:13

## 2020-07-21 RX ADMIN — SODIUM CHLORIDE 1000 ML: 9 INJECTION, SOLUTION INTRAVENOUS at 00:06

## 2020-07-21 RX ADMIN — KETOROLAC TROMETHAMINE 15 MG: 15 INJECTION, SOLUTION INTRAMUSCULAR; INTRAVENOUS at 00:48

## 2020-07-21 RX ADMIN — MORPHINE SULFATE 2 MG: 2 INJECTION, SOLUTION INTRAMUSCULAR; INTRAVENOUS at 01:35

## 2020-07-21 ASSESSMENT — ENCOUNTER SYMPTOMS
ARTHRALGIAS: 1
ACTIVITY CHANGE: 1
BRUISES/BLEEDS EASILY: 0
DYSPHORIC MOOD: 0
CONFUSION: 0
MYALGIAS: 1
NAUSEA: 0
HALLUCINATIONS: 0
JOINT SWELLING: 0
DECREASED CONCENTRATION: 0
WEAKNESS: 1
VOMITING: 0

## 2020-07-21 NOTE — ED PROVIDER NOTES
[place] EMERGENCY DEPARTMENT ([facility])  July 20, 2020  History     Chief Complaint   Patient presents with     Sickle Cell Pain Crisis     The history is provided by the patient and medical records.     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 Patient reports bilateral arm and leg pain. Patient was here in the ED on 7/18/2020 and was given morphine for pain with relief. Patient reports she has run out of her home medication of oxycodone and has been alternating ibuprofen and Tylenol with no relied. She states she has an infusion appointment tomorrow, 7/21/2020. Patient denies dysuria, cough, or chest pain. No history of DVT/PE.Notes same typical sickle cell crisis sx without new neuro changes.    PAST MEDICAL HISTORY:   Past Medical History:   Diagnosis Date     Anemia      Anxiety      Bleeding disorder (H)      Blood clotting disorder (H)      Cerebral infarction (H) 2015     Cognitive developmental delay     low IQ. 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 (H)     right hand contractures     Iron overload due to repeated red blood cell transfusions      Migraines      Oppositional defiant behavior      Uncomplicated asthma        PAST SURGICAL HISTORY:   Past Surgical History:   Procedure Laterality Date     AS INSERT TUNNELED CV 2 CATH W/O PORT/PUMP       C BREAST REDUCTION (INCLUDES LIPO) TIER 3 Bilateral 04/23/2019     IR CVC NON TUNNEL PLACEMENT  4/7/2020     REPAIR TENDON ELBOW Right 10/2/2019    Procedure: Right Elbow Flexor Lengthening, Flexor Pronator Slide Of Wrist and Finger, Thumb Adductor Lengthening;  Surgeon: Anai Franco MD;  Location: [facility] OR     TONSILLECTOMY Bilateral  10/2/2019    Procedure: Bilateral Tonsillectomy;  Surgeon: Farhana Guy MD;  Location: [facility] OR       Past medical history, past surgical history, medications, and allergies were reviewed with the patient. Additional pertinent items: None    FAMILY HISTORY:   Family History   Problem Relation Age of Onset     Sickle Cell Trait Mother      Sickle Cell Trait Father        SOCIAL HISTORY:   Social History     Tobacco Use     Smoking status: Never Smoker     Smokeless tobacco: Never Used   Substance Use Topics     Alcohol use: Not Currently     Alcohol/week: 0.0 standard drinks     Social history was reviewed with the patient. Additional pertinent items: None      Patient's Medications   New Prescriptions    No medications on file   Previous Medications    ACETAMINOPHEN (TYLENOL) 325 MG TABLET    Take 2 tablets (650 mg) by mouth every 6 hours as needed for mild pain    ALBUTEROL (PROVENTIL) (2.5 MG/3ML) 0.083% NEB SOLUTION    Take 1 vial (2.5 mg) by nebulization every 6 hours as needed for shortness of breath / dyspnea or wheezing    ASPIRIN (ASPIRIN) 81 MG EC TABLET    Take 1 tablet (81 mg) by mouth daily    BUDESONIDE-FORMOTEROL FUMARATE (SYMBICORT IN)    Inhale  into the lungs.    CELECOXIB (CELEBREX) 100 MG CAPSULE    Take 1 capsule (100 mg) by mouth 2 times daily    DIPHENHYDRAMINE (BENADRYL) 25 MG CAPSULE    Take 1-2 capsules (25-50 mg) by mouth nightly as needed for sleep    EPINEPHRINE (EPIPEN) 0.3 MG/0.3ML INJECTION        ERYTHROMYCIN ETHYLSUCCINATE (E.E.S.) 400 MG TABLET    Take 1 tablet (400 mg) by mouth 3 times daily Before meals    GABAPENTIN (NEURONTIN) 300 MG CAPSULE    Take 3 capsules (900 mg) by mouth 3 times daily    GNP SENNA-LAX 8.6 MG TABLET    Take 1 tablet by mouth 2 times daily as needed for constipation    HYDROXYUREA (HYDREA) 500 MG CAPSULE    Take 4 capsules (2,000 mg) by mouth daily    IBUPROFEN (ADVIL/MOTRIN) 200 MG TABLET    Take 3 tablets (600 mg) by mouth every 6 hours as  needed for moderate pain    JADENU 360 MG TABLET    Take 4 tablets (1,440 mg) by mouth daily    MEDROXYPROGESTERONE (DEPO-PROVERA) 150 MG/ML IM INJECTION    Inject 150 mg into the muscle    MEDROXYPROGESTERONE (PROVERA) 2.5 MG TABLET        NALOXONE (NARCAN) 4 MG/0.1ML NASAL SPRAY    Spray 1 spray (4 mg) into one nostril alternating nostrils once as needed for opioid reversal every 2-3 minutes until assistance arrives    OMEPRAZOLE (PRILOSEC) 40 MG DR CAPSULE    Take 1 capsule (40 mg) by mouth daily    ONDANSETRON (ZOFRAN) 8 MG TABLET        OXYCODONE IR (ROXICODONE) 10 MG TABLET    Take 1 tablet (10mg) by mouth every 8 hours for pain. If after 2 doses not working, call clinic.    OXYCONTIN 10 MG 12 HR TABLET    Take 1 tablet (10 mg) by mouth every 12 hours    SERTRALINE (ZOLOFT) 100 MG TABLET    Take 2 tablets (200 mg) by mouth daily   Modified Medications    No medications on file   Discontinued Medications    No medications on file          Allergies   Allergen Reactions     Fish-Derived Products Hives     Seafood Hives     Contrast Dye      Diagnostic X-Ray Materials      Gadolinium         Review of Systems   Constitutional: Positive for activity change.   Eyes: Negative for visual disturbance.   Respiratory: Negative for cough.    Cardiovascular: Negative for chest pain.   Gastrointestinal: Negative for nausea and vomiting.   Genitourinary: Negative for dysuria.   Musculoskeletal: Positive for arthralgias and myalgias. Negative for joint swelling.        Positive for bilateral arm pain.  Positive for bilateral leg pain.    Skin: Negative for rash.   Neurological: Positive for weakness (right upper extremity chronic unchanged).        Unchanged   Hematological: Does not bruise/bleed easily.   Psychiatric/Behavioral: Negative for confusion, decreased concentration, dysphoric mood and hallucinations.   All other systems reviewed and are negative.    A complete review of systems was performed with pertinent  positives and negatives noted in the HPI, and all other systems negative.    Physical Exam   BP: 133/76  Heart Rate: 89  Temp: 99.9  F (37.7  C)  Resp: 18  Weight: 68.9 kg (152 lb)  SpO2: 91 %      Physical Exam  Vitals signs and nursing note reviewed.   Constitutional:       General: She is in acute distress.      Appearance: She is well-developed. She is not ill-appearing or diaphoretic.      Comments: General patient is not writhing in pain.  Has upper extremity right-sided paresis otherwise stable.  Afebrile   HENT:      Head: Normocephalic and atraumatic.   Eyes:      General: No scleral icterus.  Neck:      Musculoskeletal: Normal range of motion and neck supple.   Cardiovascular:      Rate and Rhythm: Normal rate.   Pulmonary:      Effort: No respiratory distress.      Breath sounds: No wheezing.   Abdominal:      Tenderness: There is no abdominal tenderness.   Musculoskeletal:         General: Tenderness present.      Comments: Mild swelling noted per patient of arms and legs. Neg homans sign    Skin:     General: Skin is warm and dry.      Capillary Refill: Capillary refill takes less than 2 seconds.      Coloration: Skin is not pale.      Findings: No erythema or rash.   Neurological:      Mental Status: She is alert and oriented to person, place, and time. Mental status is at baseline.      Comments: Right upper arm paresis noted unchanged without other changes   Psychiatric:      Comments: Soft spoken flat but cooperative in the ER.No confusion noted.         ED Course   11:24 PM  The patient was seen and examined by Pedro Ryan MD in Room ED22.      Procedures           Patient valuated the ER.  Reviewed her records etc.  IV was established liter of saline given.  Oxygen ordered as needed.  Patient initially received oxycodone 10 mg orally.  Patient also received Toradol IV once creatinine noted to be within normal meds.  Her labs otherwise appear stable from previous ER visit.  Patient still having  discomfort did receive morphine 2 mg IV which she typically gets in the infusion clinic.    Patient appears very comfortable stable not writhing in pain is been eating etc.  My hope is that we will get her more comfortable and should be able to be discharged and follow-up in the infusion clinic later today.      satsnoted 94% RA without any pulmonary sx.                Results for orders placed or performed during the hospital encounter of 07/20/20 (from the past 24 hour(s))   CBC with platelets differential   Result Value Ref Range    WBC 12.8 (H) 4.0 - 11.0 10e9/L    RBC Count 2.24 (L) 3.8 - 5.2 10e12/L    Hemoglobin 7.4 (L) 11.7 - 15.7 g/dL    Hematocrit 20.7 (L) 35.0 - 47.0 %    MCV 92 78 - 100 fl    MCH 33.0 26.5 - 33.0 pg    MCHC 35.7 31.5 - 36.5 g/dL    RDW 25.9 (H) 10.0 - 15.0 %    Platelet Count 278 150 - 450 10e9/L    Diff Method Manual Differential     % Neutrophils 62.0 %    % Lymphocytes 25.0 %    % Monocytes 7.8 %    % Eosinophils 2.6 %    % Basophils 1.7 %    % Myelocytes 0.9 %    Nucleated RBCs 10 (H) 0 /100    Absolute Neutrophil 7.9 1.6 - 8.3 10e9/L    Absolute Lymphocytes 3.2 0.8 - 5.3 10e9/L    Absolute Monocytes 1.0 0.0 - 1.3 10e9/L    Absolute Eosinophils 0.3 0.0 - 0.7 10e9/L    Absolute Basophils 0.2 0.0 - 0.2 10e9/L    Absolute Myelocytes 0.1 (H) 0 10e9/L    Absolute Nucleated RBC 1.3     Anisocytosis Marked     Poikilocytosis Marked     Polychromasia Moderate     Sickle Cells Slight     Elliptocytes Marked     [place] Cells Slight     Target Cells Slight     Platelet Estimate Confirming automated cell count    Comprehensive metabolic panel   Result Value Ref Range    Sodium 138 133 - 144 mmol/L    Potassium 4.0 3.4 - 5.3 mmol/L    Chloride 109 94 - 109 mmol/L    Carbon Dioxide 24 20 - 32 mmol/L    Anion Gap 5 3 - 14 mmol/L    Glucose 89 70 - 99 mg/dL    Urea Nitrogen 11 7 - 30 mg/dL    Creatinine 0.66 0.52 - 1.04 mg/dL    GFR Estimate >90 >60 mL/min/[1.73_m2]    GFR Estimate If Black >90 >60  mL/min/[1.73_m2]    Calcium 8.4 (L) 8.5 - 10.1 mg/dL    Bilirubin Total 4.5 (H) 0.2 - 1.3 mg/dL    Albumin 3.9 3.4 - 5.0 g/dL    Protein Total 7.1 6.8 - 8.8 g/dL    Alkaline Phosphatase 68 40 - 150 U/L    ALT 82 (H) 0 - 50 U/L    AST 99 (H) 0 - 45 U/L     Medications   lidocaine 1 % 0.1-1 mL (has no administration in time range)   lidocaine (LMX4) cream (has no administration in time range)   sodium chloride (PF) 0.9% PF flush 3 mL (has no administration in time range)   sodium chloride (PF) 0.9% PF flush 3 mL (3 mLs Intracatheter Given 7/21/20 0151)   0.9% sodium chloride BOLUS (0 mLs Intravenous Stopped 7/21/20 0150)     Followed by   sodium chloride 0.9% infusion ( Intravenous Canceled Entry 7/21/20 0150)   oxyCODONE (ROXICODONE) tablet 10 mg (10 mg Oral Given 7/20/20 2347)   ketorolac (TORADOL) injection 15 mg (15 mg Intravenous Given 7/21/20 0048)   morphine (PF) injection 2 mg (2 mg Intravenous Given 7/21/20 0135)             Assessments & Plan (with Medical Decision Making)  20-year-old female history of sickle cell disease presents with crisis-like symptoms patient seen in the ER last few days.  She apparently now run of her medications has appointment infusion center later today.  Presented ER with her similar ongoing symptoms no pulmonary symptoms no chest pain shortness of breath or fever but generalized achiness noted arthralgias etc.  No sign of infection otherwise no significant swelling to represent anything concerning for DVT etc.  Patient here in the ER treated with IV fluids along with oxygen as needed patient had labs drawn and reviewed which appears stable from a few days ago.  She did receive oxycodone 10 mg orally along with this 15 mg of Toradol IV.  Patient also given 2 mg of morphine as she does get this in the fusion center.  At this point patient appears stable helps is to discharge her home and she can follow-up with infusion center later today. Sign out to night MD.             I have  reviewed the nursing notes.    I have reviewed the findings, diagnosis, plan and need for follow up with the patient.    New Prescriptions    No medications on file       Final diagnoses:   Sickle cell crisis (H)     I, Sisi Alejandra, am serving as a trained medical scribe to document services personally performed by Pedro Ryan MD, based on the provider's statements to me.      IPedro MD, was physically present and have reviewed and verified the accuracy of this note documented by Sisi Alejandra.     7/20/2020   [facility] EMERGENCY DEPARTMENT    This note was created at least in part by the use of dragon voice dictation system. Inadvertent typographical errors may still exist.  Pedro Ryan MD.    Patient evaluated in the emergency department during the COVID-19 pandemic period. Careful attention to patients safety was addressed throughout the evaluation. Evaluation and treatment management was initiated with disposition made efficiently and appropriate as possible to minimize any risk of potential exposure to patient during this evaluation.       Pedro Ryan MD  07/21/20 0201

## 2020-07-21 NOTE — PROGRESS NOTES
Writer placed call to patient to advise that per [facility], patient needs to call to arrange for delivery of Jadenu. Per pharmacy Diane bejarano, several attempts have been made with no answer or call return. Patient states she has the call back number and will call them. Appointment information for IVF/pain meds for tomorrow provided to patient.

## 2020-07-21 NOTE — ED TRIAGE NOTES
Per patient report was recently here for sickle cell pain, appoinment tomorrow morning at infusion clinic. Patient reports BLE and BUE and lower back. Patient took ibuprofen and tylenol. Patient is out of pain medication until tomorrow.

## 2020-07-21 NOTE — DISCHARGE INSTRUCTIONS
Home.  Follow up later today in the infusion clinic as planned for further management of your sickle cell symptoms.

## 2020-07-21 NOTE — PROGRESS NOTES
Patient placed call to writer to advise that she had just returned from the ER a few hours ago and did not want to come in for infusion today but would like to set up something for tomorrow for IVF/pain meds. When asked, patient responded that she did want to keep her telephone visit with Dr Duncan at 1100 today. Writer advised Dr Duncan and he is in agreement with plan. Appointment for today canceled and [facility] will be able to see her at 2:00 pm tomorrow, July 22.

## 2020-07-21 NOTE — LETTER
"    7/21/2020         RE: Jennifer Cervantes  [address]  [facility] [id number]        Dear Colleague,    Thank you for referring your patient, Jennifer Cervantes, to the [facility]. Please see a copy of my visit note below.    Jennifer Cervantes is a 20 year old female who is being evaluated via a billable telephone visit.        I have reviewed and updated the patient's allergies and medication list. Patient was asked to provide any patient recorded vital signs, height and/or weight.  Please see in \"Patient Reported Vital Signs\" tab information.        Concerns: Patient has no new concerns.    Refills: None     ADALID Hayes          Phone call duration: 8 minutes    Eric Duncan MD    -------------------------------------------      Sickle Cell Clinic Follow Up Outpatient Visit    Date of encounter: 6/19/2020    Jennifer Cervantes is a 20 year old female who is being evaluated via a billable phne visit.  (initial attempts were for a video visit but were unsuccessful)    The patient has been notified of following:     \"This phone visit will be conducted via a call between you and your physician/provider. We have found that certain health care needs can be provided without the need for an in-person physical exam.  This service lets us provide the care you need with a phone conversation.  If a prescription is necessary we can send it directly to your pharmacy.  If lab work is needed we can place an order for that and you can then stop by our lab to have the test done at a later time.    Phone visits are billed at different rates depending on your insurance coverage.  Please reach out to your insurance provider with any questions.    If during the course of the call the physician/provider feels a phone visit is not appropriate, you will not be charged for this service.\"    Patient has given verbal consent for Phone visit? Yes    Will anyone else be joining your video visit? No      Visit " "Details    Type of service:  Phone Visit      Phone visit:  1:18 PM  1:33 pm   15 min  Originating Location (pt. Location): Home    Distant Location (provider location):  [facility]     Platform used for Video Visit: Clarisa Duncan MD          Visit Notes    Jennifer Cervantes is a 20 year old female who is has a follow up outpatient hematology visit for Hb SS.    Jennifer's visit was done on the phone due to difficulty with the video    Interval History  Jennifer had a difficult time last week for pain. She had too much pain to wait in clinic on Friday and ended up coming to the ED twice overnight, though she was not admitted. She was recommended to increase her dosing of oxycodone to every 6 hours and she finally did that later in the weekend. She has not been taking her Jadenu, saying it has not been sent to her, and reports to good HU use, though we discussed how her labs represent many missed doses. No cough or fever. We did not get to talk about the BMT appointment as she was tired from being in the ED overnight.       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 She also has significant anxiety and depression with a strong anxiety about transferring to the adult clinic. She usually has pain crises secondary to the anxiety. This \"fear of the unknown\" is significant enough that she wants to tour the inpatient floor before the transition or before she gets admitted there. [facility] with recurrent pain crises and is actually out on pass now but is still admitted as of Friday 2/28. She has no real support from family at " home and that, combined with her cognitive delays, make her care even more complex. She is having some back pain today which is a frequent location for her. She does say that her oral options do take an edge off. No fevers or cough, chest pain, dyspnea, bruising, or GI symptoms today. She has gotten a couple doses of Desferal for iron overload as she has been on chronic transfusions post-stroke for a long time. She is up to date on immunizations and got to meet with Daya Carter last week (also while on pass). It is unclear when she will discharge from [facility] but based on recent history, she thinks she will have a pain crisis soon after discharge.    Key results prior to referral:  MR ABDOMEN W/O CONTRAST ([facility]), 10/3/2017  (unlikely to be most recent one)     Comparison: 12/28/2015   Clinical history: Sickle cell disease   Findings:     Average liver iron concentration:  28.6 mg/g dry tissue, previously 22.8 mg/g dry tissue (NR: 0.17-1.8)  513 mmol/kg dry tissue, previously 400.8 mmol/kg dry tissue (NR: 3-33)     There is also iron deposition in the spleen, which is enlarged.  Prominent vessels in the left upper quadrant, which may be related to varices, are unchanged from the comparison examinations.                                                            Impression:  1. Increase in elevated liver concentration.  2. Splenomegaly. Question portal hypertension.     WAYNE CURTIS MD    11/4/19  MRI Pelvis  Summary:  Marrow changes consistent with SCD but no convincing evidence of AVN at this time.     1/28/20 MRI/MRA  Comparison 5/8/15  Summary:  Stable appearance of the brain and cerebral vasculature compared to 5/8/2015, including remote left MCA teritory infarct. No evidence of new infarct or new abnormality on MRA    3/10/2020 Cardiac MRI      Review of systems:  A complete 14 point review of systems was completed. All were negative except for what was reported in the HPI or highlighted  here.    --------------------------------  Plan last reviewed with patient: 5/22/2020    Patient background: 21yo F, enjoys movies and kids though there are times where she does not really want to talk to people. Does not have a lot of social support at home.     Sickle Cell Disease History  Primary Hematologist: Perla  PCP: Raul  Genotype: SS  Acute Pain Crisis Treatment:    ER/Acute Care/Infusion Clinic:   o Morphine 2 mg IVP/SC Q1H X 3 doses  o Toradol  o Maintenance IV fluids with D5 half-normal saline  o Other: Benadryl PO and Zofran 8 mg IV PRN itching or nausea    Inpatient:  o Opioid: Morphine 2 mg IV Q1H PRN until PCA starts  o PCA plan:   - PCA button dose: Morphine 1 mg  - Lockout: 10 minutes  - Continuous Infusion: consider 1 mg/hr  - One hr max: 6 mg  o Try to wean to OxyCONTIN 10 mg q12h with Morphine 0.7 mg IV q10 min PRN rather than continuous infusion  o Other Medications: Benadryl, Zofran  o If PCA not working, she Has had success with ketamine starting at 4mg/h and advancing only to 6mg/h, as 8mg/h made her feel quite poorly.  o If giving scheduled toradol, hold ASA (ok to give celebrex if she prefers)  o Supportive Care: Docusate, Senna  Chronic Pain Medications:    Home regimen  OxyCONTIN 10 mg po q 12 hrs and oxycodone 10 mg p.o. q.6 hours p.r.n. breakthrough pain #60/month.  She also continues on Celebrex, and Zoloft among other medications.    -Also benefits from mental health visits, acupuncture  Baseline Hemoglobin: 9.5 g/dL (on chronic transfusions)  Hydroxyurea use: Yes  H/O blood transfusions: Yes, several (iron overload) Most recent 5/22/20    H/O Transfusion Reactions: no    Antibodies:none  H/O Acute Chest Syndrome: Yes    Last episode: 10/2019     ICU/intubation: unsure  H/O Stroke: Yes (managed with chronic transfusions (has left her with neurodevelopmental deficits)  H/O VTE: no  H/O Cholecystectomy or Splenectomy: no  H/O Asthma, Pulm HTN, AVN, Leg Ulcers, Nephropathy,  Retinopathy, etc: Iron overload, asthma, chronic lung disease    EMERGENCY CARE PLAN  DO NOT TRANSFUSE WITHOUT DISCUSSING WITH HEMATOLOGY ATTENDING (available 24/7).       TRANSFUSION IS RISKY DUE TO RISK OF ALLOIMMUNIZATION AGAINST RBC ANTIGENS AND IRON OVERLOAD.  INDICATIONS FOR TRANSFUSION ARE ACUTE STROKE AND ACUTE CHEST SYNDROME (hypoxia plus infiltrate, not chest pain).  DO NOT TRANSFUSE FOR ANEMIA      -------------------------------------------    Sickle Cell Disease Comprehensive Checklist    Bone Health/Avascular Necrosis Screening/Symptoms (each visit): none today    Leg Ulcer evaluation (every visit): none    Hypertension (every visit): none    Last ophthalmologic exam: within last year (timing unsure)    Last urinalysis for microalbuminuria/CKD (annually): within last year    Last pulmonary evaluation (asthma, AMAN, pulm HTN): within last year    Stroke/silent cerebral infarct Hx (Y/N): Yes    Last PCP Visit: 2/2020    Vaccines:  o PCV13: 5/13/19  o Pneumovax (PPSV23): 3/04, 10/09, 7/12/19 (next due 7/2024)  o Menactra: 4/2010, 9/2015 (MCV due Sept 2020)  o Trumemba:  o Influenza: got 19-20 vaccine    Audiology (chelation): needed post-transition    Past Medical History:  Past Medical History:   Diagnosis Date     Anemia      Anxiety      Bleeding disorder (H)      Blood clotting disorder (H)      Cerebral infarction (H) 2015     Cognitive developmental delay     low IQ. 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 (H)     right hand contractures     Iron overload due to repeated red blood cell transfusions      Migraines      Oppositional defiant behavior      Uncomplicated asthma        Past Surgical History:  Past Surgical History:   Procedure Laterality Date     AS INSERT TUNNELED CV 2 CATH W/O PORT/PUMP       C BREAST REDUCTION (INCLUDES LIPO) TIER 3 Bilateral 04/23/2019      IR CVC NON TUNNEL PLACEMENT  4/7/2020     REPAIR TENDON ELBOW Right 10/2/2019    Procedure: Right Elbow Flexor Lengthening, Flexor Pronator Slide Of Wrist and Finger, Thumb Adductor Lengthening;  Surgeon: Ania Franco MD;  Location: [facility] OR     TONSILLECTOMY Bilateral 10/2/2019    Procedure: Bilateral Tonsillectomy;  Surgeon: Farhana Guy MD;  Location: [facility] OR       Family History:   Family History   Problem Relation Age of Onset     Sickle Cell Trait Mother      Sickle Cell Trait Father        Social History:  Social History     Socioeconomic History     Marital status: Single     Spouse name: Not on file     Number of children: Not on file     Years of education: Not on file     Highest education level: Not on file   Occupational History     Not on file   Social Needs     Financial resource strain: Not on file     Food insecurity     Worry: Not on file     Inability: Not on file     Transportation needs     Medical: Not on file     Non-medical: Not on file   Tobacco Use     Smoking status: Never Smoker     Smokeless tobacco: Never Used   Substance and Sexual Activity     Alcohol use: Not Currently     Alcohol/week: 0.0 standard drinks     Drug use: Never     Sexual activity: Not Currently     Partners: Male     Birth control/protection: Other   Lifestyle     Physical activity     Days per week: Not on file     Minutes per session: Not on file     Stress: Not on file   Relationships     Social connections     Talks on phone: Not on file     Gets together: Not on file     Attends Alevism service: Not on file     Active member of club or organization: Not on file     Attends meetings of clubs or organizations: Not on file     Relationship status: Not on file     Intimate partner violence     Fear of current or ex partner: Not on file     Emotionally abused: Not on file     Physically abused: Not on file     Forced sexual activity: Not on file   Other Topics Concern     Parent/sibling w/  "CABG, MI or angioplasty before 65F 55M? Not Asked   Social History Narrative    Lives with mom and extended family but \"toxic environment\" per her report. She would like to move out but cannot financially do so. She has minimal support at home despite her significant SCD comorbidities and cognitive delay from stroke.       Medications:  Current Outpatient Medications   Medication     acetaminophen (TYLENOL) 325 MG tablet     albuterol (PROVENTIL) (2.5 MG/3ML) 0.083% neb solution     aspirin (ASPIRIN) 81 MG EC tablet     Budesonide-Formoterol Fumarate (SYMBICORT IN)     celecoxib (CELEBREX) 100 MG capsule     diphenhydrAMINE (BENADRYL) 25 MG capsule     EPINEPHrine (EPIPEN) 0.3 MG/0.3ML injection     erythromycin ethylsuccinate (E.E.S.) 400 MG tablet     gabapentin (NEURONTIN) 300 MG capsule     GNP SENNA-LAX 8.6 MG tablet     hydroxyurea (HYDREA) 500 MG capsule     ibuprofen (ADVIL/MOTRIN) 200 MG tablet     JADENU 360 MG tablet     medroxyPROGESTERone (DEPO-PROVERA) 150 MG/ML IM injection     medroxyPROGESTERone (PROVERA) 2.5 MG tablet     naloxone (NARCAN) 4 MG/0.1ML nasal spray     omeprazole (PRILOSEC) 40 MG DR capsule     ondansetron (ZOFRAN) 8 MG tablet     oxyCODONE IR (ROXICODONE) 10 MG tablet     OXYCONTIN 10 MG 12 hr tablet     sertraline (ZOLOFT) 100 MG tablet     No current facility-administered medications for this visit.      Facility-Administered Medications Ordered in Other Visits   Medication     dextrose 5% and 0.45% NaCl BOLUS     diphenhydrAMINE (BENADRYL) capsule 25 mg     heparin 100 UNIT/ML injection 5 mL     heparin 100 UNIT/ML injection 5 mL     morphine (PF) injection 2 mg         Physical Exam:   No exam (phone visit)    Results for HIMANSHU AL (MRN [id number]) as of 7/22/2020 15:21   Ref. Range 7/21/2020 00:05   Sodium Latest Ref Range: 133 - 144 mmol/L 138   Potassium Latest Ref Range: 3.4 - 5.3 mmol/L 4.0   Chloride Latest Ref Range: 94 - 109 mmol/L 109   Carbon Dioxide Latest Ref " Range: 20 - 32 mmol/L 24   Urea Nitrogen Latest Ref Range: 7 - 30 mg/dL 11   Creatinine Latest Ref Range: 0.52 - 1.04 mg/dL 0.66   GFR Estimate Latest Ref Range: >60 mL/min/1.73_m2 >90   GFR Estimate If Black Latest Ref Range: >60 mL/min/1.73_m2 >90   Calcium Latest Ref Range: 8.5 - 10.1 mg/dL 8.4 (L)   Anion Gap Latest Ref Range: 3 - 14 mmol/L 5   Albumin Latest Ref Range: 3.4 - 5.0 g/dL 3.9   Protein Total Latest Ref Range: 6.8 - 8.8 g/dL 7.1   Bilirubin Total Latest Ref Range: 0.2 - 1.3 mg/dL 4.5 (H)   Alkaline Phosphatase Latest Ref Range: 40 - 150 U/L 68   ALT Latest Ref Range: 0 - 50 U/L 82 (H)   AST Latest Ref Range: 0 - 45 U/L 99 (H)   Glucose Latest Ref Range: 70 - 99 mg/dL 89   WBC Latest Ref Range: 4.0 - 11.0 10e9/L 12.8 (H)   Hemoglobin Latest Ref Range: 11.7 - 15.7 g/dL 7.4 (L)   Hematocrit Latest Ref Range: 35.0 - 47.0 % 20.7 (L)   Platelet Count Latest Ref Range: 150 - 450 10e9/L 278   RBC Count Latest Ref Range: 3.8 - 5.2 10e12/L 2.24 (L)   MCV Latest Ref Range: 78 - 100 fl 92   MCH Latest Ref Range: 26.5 - 33.0 pg 33.0   MCHC Latest Ref Range: 31.5 - 36.5 g/dL 35.7   RDW Latest Ref Range: 10.0 - 15.0 % 25.9 (H)   Diff Method Unknown Manual Differential   % Neutrophils Latest Units: % 62.0   % Lymphocytes Latest Units: % 25.0   % Monocytes Latest Units: % 7.8   % Eosinophils Latest Units: % 2.6   % Basophils Latest Units: % 1.7   % Myelocytes Latest Units: % 0.9   Nucleated RBCs Latest Ref Range: 0 /100 10 (H)   Absolute Neutrophil Latest Ref Range: 1.6 - 8.3 10e9/L 7.9   Absolute Lymphocytes Latest Ref Range: 0.8 - 5.3 10e9/L 3.2   Absolute Monocytes Latest Ref Range: 0.0 - 1.3 10e9/L 1.0   Absolute Eosinophils Latest Ref Range: 0.0 - 0.7 10e9/L 0.3   Absolute Basophils Latest Ref Range: 0.0 - 0.2 10e9/L 0.2   Absolute Myelocytes Latest Ref Range: 0 10e9/L 0.1 (H)   Absolute Nucleated RBC Unknown 1.3   Anisocytosis Unknown Marked   Poikilocytosis Unknown Marked   Polychromasia Unknown Moderate    Sickle Cells Unknown Slight   Target Cells Unknown Slight   Elliptocytes Unknown Marked   [place] Cells Unknown Slight   Platelet Estimate Unknown Confirming automated cell count   % Retic Latest Ref Range: 0.5 - 2.0 % 28.1 (H)   Absolute Retic Latest Ref Range: 25 - 95 10e9/L 624.0 (H)     Imaging: none today    Assessment:  Jennifer Cervantes is a 20 year old female with HbSS. Her disease has been complicated by stroke leading to significant cognitive delay and right UE hemiparesis, high anxiety leading to frequent pain crises on top of chronic pain syndrome, poor social structure at home with no real support, and iron overload secondary to repeated transfusions. She is had had a rough week or two for pain control but is a bit better now.      Plan:  1) HbSS and complications   -- Pain plan in chart. Not reviewed in this visit. reviewed and updated.    Recommended trying 400-600 ibuprofen when pain is moderate to fill in gaps from oxycodone. Tylenol is okay for mild pain but may not directly affect SCD pathophysiology in the same way as ibuprofen              --Need to readdress her BMT visit at a later time. .   --She had been on q6 week transfusions (exchanged 4/7) Last transfusion early May. Had CVA ~1 yo and another TIA ~2014 during a trial off transfusions and just HU. My goal is to ultimately stop the transfusions given her significant iron overload but she has taken some time to become comfortable with this. She is ~3 months out from the exchange.   --Needs aggressive chelation. Jadenu now 1440 mg daily and well-tolerated per report but denies current use, consistent with rising ferritin. Also need to have audiology appt when COVID restrictions zaid. Referral placed recently.   --Continue ASA post-stroke  2) Labs: CBC, CMP, ferritin (still slowly rising after a drop for a few months with increased-dose Jadenu   3) Medication Changes: Oxycodone refilled recently.  5)  HCM: PCP is Daya Carter. Need to get SW  closely involved.   6)  Psych: 200 mg Zoloft   7)  Facial numbness/tingling: secondary to apheresis line 2 months ago. Recommended OTC 4% lidocaine (Aspercreme) PRN  8) Next visit: 2 weeks in person    It was a pleasure talking to Jennifer today and in continuing to guide her care and transition to adult care moving forward.    I spent a total of 8 minutes on the phone with Jennifer during today's office visit. See note for details.        Eric Duncan MD  Hematologist  Division of Hematology, Oncology, and Transplantation  [facility]  [facility]  Pager: [phone]

## 2020-07-21 NOTE — PROGRESS NOTES
"Jennifer Cervantes is a 20 year old female who is being evaluated via a billable telephone visit.      The patient has been notified of following:     \"This telephone visit will be conducted via a call between you and your physician/provider. We have found that certain health care needs can be provided without the need for a physical exam.  This service lets us provide the care you need with a short phone conversation.  If a prescription is necessary we can send it directly to your pharmacy.  If lab work is needed we can place an order for that and you can then stop by our lab to have the test done at a later time.    Telephone visits are billed at different rates depending on your insurance coverage. During this emergency period, for some insurers they may be billed the same as an in-person visit.  Please reach out to your insurance provider with any questions.    If during the course of the call the physician/provider feels a telephone visit is not appropriate, you will not be charged for this service.\"    Patient has given verbal consent for Telephone visit?  Yes    What phone number would you like to be contacted at? [phone]    How would you like to obtain your AVS? Leonarda     I have reviewed and updated the patient's allergies and medication list. Patient was asked to provide any patient recorded vital signs, height and/or weight.  Please see in \"Patient Reported Vital Signs\" tab information.        Concerns: Patient has no new concerns.    Refills: None     ADALID Hayes          Phone call duration: 8 minutes    Eric Duncan MD    -------------------------------------------      [facility] Follow Up Outpatient Visit    Date of encounter: 6/19/2020    Jennifer Cervantes is a 20 year old female who is being evaluated via a billable phne visit.  (initial attempts were for a video visit but were unsuccessful)    The patient has been notified of following:     \"This phone visit will be conducted " "via a call between you and your physician/provider. We have found that certain health care needs can be provided without the need for an in-person physical exam.  This service lets us provide the care you need with a phone conversation.  If a prescription is necessary we can send it directly to your pharmacy.  If lab work is needed we can place an order for that and you can then stop by our lab to have the test done at a later time.    Phone visits are billed at different rates depending on your insurance coverage.  Please reach out to your insurance provider with any questions.    If during the course of the call the physician/provider feels a phone visit is not appropriate, you will not be charged for this service.\"    Patient has given verbal consent for Phone visit? Yes    Will anyone else be joining your video visit? No      Visit Details    Type of service:  Phone Visit      Phone visit:  1:18 PM  1:33 pm   15 min  Originating Location (pt. Location): Home    Distant Location (provider location):  [facility]     Platform used for Video Visit: Clarisa Duncan MD          Visit Notes    Jennifer Cervantes is a 20 year old female who is has a follow up outpatient hematology visit for Hb SS.    Jennifer's visit was done on the phone due to difficulty with the video    Interval History  Jennifer had a difficult time last week for pain. She had too much pain to wait in clinic on Friday and ended up coming to the ED twice overnight, though she was not admitted. She was recommended to increase her dosing of oxycodone to every 6 hours and she finally did that later in the weekend. She has not been taking her Jadenu, saying it has not been sent to her, and reports to good HU use, though we discussed how her labs represent many missed doses. No cough or fever. We did not get to talk about the BMT appointment as she was tired from being in the ED overnight.       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 She also has significant anxiety and depression with a strong anxiety about transferring to the adult clinic. She usually has pain crises secondary to the anxiety. This \"fear of the unknown\" is significant enough that she wants to tour the inpatient floor before the transition or before she gets admitted there. She has been admitted to [facility] most of the last few months with recurrent pain crises and is actually out on pass now but is still admitted as of Friday 2/28. She has no real support from family at home and that, combined with her cognitive delays, make her care even more complex. She is having some back pain today which is a frequent location for her. She does say that her oral options do take an edge off. No fevers or cough, chest pain, dyspnea, bruising, or GI symptoms today. She has gotten a couple doses of Desferal for iron overload as she has been on chronic transfusions post-stroke for a long time. She is up to date on immunizations and got to meet with Daya Carter last week (also while on pass). It is unclear when she will discharge from [facility] but based on recent history, she thinks she will have a pain crisis soon after discharge.    Key results prior to referral:  MR ABDOMEN W/O CONTRAST ([facility]), 10/3/2017  (unlikely to be most recent one)     Comparison: 12/28/2015   Clinical history: Sickle cell disease   Findings:     Average liver iron concentration:  28.6 mg/g dry tissue, previously 22.8 mg/g dry tissue (NR: 0.17-1.8)  513 mmol/kg dry tissue, previously 400.8 mmol/kg dry tissue (NR: 3-33)     There is also iron deposition in the spleen, " which is enlarged.  Prominent vessels in the left upper quadrant, which may be related to varices, are unchanged from the comparison examinations.                                                            Impression:  1. Increase in elevated liver concentration.  2. Splenomegaly. Question portal hypertension.     WAYNE CURTIS MD    11/4/19  MRI Pelvis  Summary:  Marrow changes consistent with SCD but no convincing evidence of AVN at this time.     1/28/20 MRI/MRA  Comparison 5/8/15  Summary:  Stable appearance of the brain and cerebral vasculature compared to 5/8/2015, including remote left MCA teritory infarct. No evidence of new infarct or new abnormality on MRA    3/10/2020 Cardiac MRI      Review of systems:  A complete 14 point review of systems was completed. All were negative except for what was reported in the HPI or highlighted here.    --------------------------------  Plan last reviewed with patient: 5/22/2020    Patient background: 19yo F, enjoys movies and kids though there are times where she does not really want to talk to people. Does not have a lot of social support at home.     Sickle Cell Disease History  Primary Hematologist: Perla  PCP: Raul  Genotype: SS  Acute Pain Crisis Treatment:    ER/Acute Care/Infusion Clinic:   o Morphine 2 mg IVP/SC Q1H X 3 doses  o Toradol  o Maintenance IV fluids with D5 half-normal saline  o Other: Benadryl PO and Zofran 8 mg IV PRN itching or nausea    Inpatient:  o Opioid: Morphine 2 mg IV Q1H PRN until PCA starts  o PCA plan:   - PCA button dose: Morphine 1 mg  - Lockout: 10 minutes  - Continuous Infusion: consider 1 mg/hr  - One hr max: 6 mg  o Try to wean to OxyCONTIN 10 mg q12h with Morphine 0.7 mg IV q10 min PRN rather than continuous infusion  o Other Medications: Benadryl, Zofran  o If PCA not working, she Has had success with ketamine starting at 4mg/h and advancing only to 6mg/h, as 8mg/h made her feel quite poorly.  o If giving scheduled toradol,  hold ASA (ok to give celebrex if she prefers)  o Supportive Care: Docusate, Senna  Chronic Pain Medications:    Home regimen  OxyCONTIN 10 mg po q 12 hrs and oxycodone 10 mg p.o. q.6 hours p.r.n. breakthrough pain #60/month.  She also continues on Celebrex, and Zoloft among other medications.    -Also benefits from mental health visits, acupuncture  Baseline Hemoglobin: 9.5 g/dL (on chronic transfusions)  Hydroxyurea use: Yes  H/O blood transfusions: Yes, several (iron overload) Most recent 5/22/20    H/O Transfusion Reactions: no    Antibodies:none  H/O Acute Chest Syndrome: Yes    Last episode: 10/2019     ICU/intubation: unsure  H/O Stroke: Yes (managed with chronic transfusions (has left her with neurodevelopmental deficits)  H/O VTE: no  H/O Cholecystectomy or Splenectomy: no  H/O Asthma, Pulm HTN, AVN, Leg Ulcers, Nephropathy, Retinopathy, etc: Iron overload, asthma, chronic lung disease    EMERGENCY CARE PLAN  DO NOT TRANSFUSE WITHOUT DISCUSSING WITH HEMATOLOGY ATTENDING (available 24/7).       TRANSFUSION IS RISKY DUE TO RISK OF ALLOIMMUNIZATION AGAINST RBC ANTIGENS AND IRON OVERLOAD.  INDICATIONS FOR TRANSFUSION ARE ACUTE STROKE AND ACUTE CHEST SYNDROME (hypoxia plus infiltrate, not chest pain).  DO NOT TRANSFUSE FOR ANEMIA      -------------------------------------------    Sickle Cell Disease Comprehensive Checklist    Bone Health/Avascular Necrosis Screening/Symptoms (each visit): none today    Leg Ulcer evaluation (every visit): none    Hypertension (every visit): none    Last ophthalmologic exam: within last year (timing unsure)    Last urinalysis for microalbuminuria/CKD (annually): within last year    Last pulmonary evaluation (asthma, AMAN, pulm HTN): within last year    Stroke/silent cerebral infarct Hx (Y/N): Yes    Last PCP Visit: 2/2020    Vaccines:  o PCV13: 5/13/19  o Pneumovax (PPSV23): 3/04, 10/09, 7/12/19 (next due 7/2024)  o Menactra: 4/2010, 9/2015 (MCV due Sept  2020)  o Trumemba:  o Influenza: got 19-20 vaccine    Audiology (chelation): needed post-transition    Past Medical History:  Past Medical History:   Diagnosis Date     Anemia      Anxiety      Bleeding disorder (H)      Blood clotting disorder (H)      Cerebral infarction (H) 2015     Cognitive developmental delay     low IQ. 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 (H)     right hand contractures     Iron overload due to repeated red blood cell transfusions      Migraines      Oppositional defiant behavior      Uncomplicated asthma        Past Surgical History:  Past Surgical History:   Procedure Laterality Date     AS INSERT TUNNELED CV 2 CATH W/O PORT/PUMP       C BREAST REDUCTION (INCLUDES LIPO) TIER 3 Bilateral 04/23/2019     IR CVC NON TUNNEL PLACEMENT  4/7/2020     REPAIR TENDON ELBOW Right 10/2/2019    Procedure: Right Elbow Flexor Lengthening, Flexor Pronator Slide Of Wrist and Finger, Thumb Adductor Lengthening;  Surgeon: Anai Franco MD;  Location: [facility] OR     TONSILLECTOMY Bilateral 10/2/2019    Procedure: Bilateral Tonsillectomy;  Surgeon: Farhana Guy MD;  Location: [facility] OR       Family History:   Family History   Problem Relation Age of Onset     Sickle Cell Trait Mother      Sickle Cell Trait Father        Social History:  Social History     Socioeconomic History     Marital status: Single     Spouse name: Not on file     Number of children: Not on file     Years of education: Not on file     Highest education level: Not on file   Occupational History     Not on file   Social Needs     Financial resource strain: Not on file     Food insecurity     Worry: Not on file     Inability: Not on file     Transportation needs     Medical: Not on file     Non-medical: Not on file   Tobacco Use     Smoking status: Never Smoker     Smokeless tobacco: Never  "Used   Substance and Sexual Activity     Alcohol use: Not Currently     Alcohol/week: 0.0 standard drinks     Drug use: Never     Sexual activity: Not Currently     Partners: Male     Birth control/protection: Other   Lifestyle     Physical activity     Days per week: Not on file     Minutes per session: Not on file     Stress: Not on file   Relationships     Social connections     Talks on phone: Not on file     Gets together: Not on file     Attends Alevism service: Not on file     Active member of club or organization: Not on file     Attends meetings of clubs or organizations: Not on file     Relationship status: Not on file     Intimate partner violence     Fear of current or ex partner: Not on file     Emotionally abused: Not on file     Physically abused: Not on file     Forced sexual activity: Not on file   Other Topics Concern     Parent/sibling w/ CABG, MI or angioplasty before 65F 55M? Not Asked   Social History Narrative    Lives with mom and extended family but \"toxic environment\" per her report. She would like to move out but cannot financially do so. She has minimal support at home despite her significant SCD comorbidities and cognitive delay from stroke.       Medications:  Current Outpatient Medications   Medication     acetaminophen (TYLENOL) 325 MG tablet     albuterol (PROVENTIL) (2.5 MG/3ML) 0.083% neb solution     aspirin (ASPIRIN) 81 MG EC tablet     Budesonide-Formoterol Fumarate (SYMBICORT IN)     celecoxib (CELEBREX) 100 MG capsule     diphenhydrAMINE (BENADRYL) 25 MG capsule     EPINEPHrine (EPIPEN) 0.3 MG/0.3ML injection     erythromycin ethylsuccinate (E.E.S.) 400 MG tablet     gabapentin (NEURONTIN) 300 MG capsule     GNP SENNA-LAX 8.6 MG tablet     hydroxyurea (HYDREA) 500 MG capsule     ibuprofen (ADVIL/MOTRIN) 200 MG tablet     JADENU 360 MG tablet     medroxyPROGESTERone (DEPO-PROVERA) 150 MG/ML IM injection     medroxyPROGESTERone (PROVERA) 2.5 MG tablet     naloxone (NARCAN) 4 " MG/0.1ML nasal spray     omeprazole (PRILOSEC) 40 MG DR capsule     ondansetron (ZOFRAN) 8 MG tablet     oxyCODONE IR (ROXICODONE) 10 MG tablet     OXYCONTIN 10 MG 12 hr tablet     sertraline (ZOLOFT) 100 MG tablet     No current facility-administered medications for this visit.      Facility-Administered Medications Ordered in Other Visits   Medication     dextrose 5% and 0.45% NaCl BOLUS     diphenhydrAMINE (BENADRYL) capsule 25 mg     heparin 100 UNIT/ML injection 5 mL     heparin 100 UNIT/ML injection 5 mL     morphine (PF) injection 2 mg         Physical Exam:   No exam (phone visit)    Results for HIMANSHU AL (MRN [id number]) as of 7/22/2020 15:21   Ref. Range 7/21/2020 00:05   Sodium Latest Ref Range: 133 - 144 mmol/L 138   Potassium Latest Ref Range: 3.4 - 5.3 mmol/L 4.0   Chloride Latest Ref Range: 94 - 109 mmol/L 109   Carbon Dioxide Latest Ref Range: 20 - 32 mmol/L 24   Urea Nitrogen Latest Ref Range: 7 - 30 mg/dL 11   Creatinine Latest Ref Range: 0.52 - 1.04 mg/dL 0.66   GFR Estimate Latest Ref Range: >60 mL/min/1.73_m2 >90   GFR Estimate If Black Latest Ref Range: >60 mL/min/1.73_m2 >90   Calcium Latest Ref Range: 8.5 - 10.1 mg/dL 8.4 (L)   Anion Gap Latest Ref Range: 3 - 14 mmol/L 5   Albumin Latest Ref Range: 3.4 - 5.0 g/dL 3.9   Protein Total Latest Ref Range: 6.8 - 8.8 g/dL 7.1   Bilirubin Total Latest Ref Range: 0.2 - 1.3 mg/dL 4.5 (H)   Alkaline Phosphatase Latest Ref Range: 40 - 150 U/L 68   ALT Latest Ref Range: 0 - 50 U/L 82 (H)   AST Latest Ref Range: 0 - 45 U/L 99 (H)   Glucose Latest Ref Range: 70 - 99 mg/dL 89   WBC Latest Ref Range: 4.0 - 11.0 10e9/L 12.8 (H)   Hemoglobin Latest Ref Range: 11.7 - 15.7 g/dL 7.4 (L)   Hematocrit Latest Ref Range: 35.0 - 47.0 % 20.7 (L)   Platelet Count Latest Ref Range: 150 - 450 10e9/L 278   RBC Count Latest Ref Range: 3.8 - 5.2 10e12/L 2.24 (L)   MCV Latest Ref Range: 78 - 100 fl 92   MCH Latest Ref Range: 26.5 - 33.0 pg 33.0   MCHC Latest Ref Range:  31.5 - 36.5 g/dL 35.7   RDW Latest Ref Range: 10.0 - 15.0 % 25.9 (H)   Diff Method Unknown Manual Differential   % Neutrophils Latest Units: % 62.0   % Lymphocytes Latest Units: % 25.0   % Monocytes Latest Units: % 7.8   % Eosinophils Latest Units: % 2.6   % Basophils Latest Units: % 1.7   % Myelocytes Latest Units: % 0.9   Nucleated RBCs Latest Ref Range: 0 /100 10 (H)   Absolute Neutrophil Latest Ref Range: 1.6 - 8.3 10e9/L 7.9   Absolute Lymphocytes Latest Ref Range: 0.8 - 5.3 10e9/L 3.2   Absolute Monocytes Latest Ref Range: 0.0 - 1.3 10e9/L 1.0   Absolute Eosinophils Latest Ref Range: 0.0 - 0.7 10e9/L 0.3   Absolute Basophils Latest Ref Range: 0.0 - 0.2 10e9/L 0.2   Absolute Myelocytes Latest Ref Range: 0 10e9/L 0.1 (H)   Absolute Nucleated RBC Unknown 1.3   Anisocytosis Unknown Marked   Poikilocytosis Unknown Marked   Polychromasia Unknown Moderate   Sickle Cells Unknown Slight   Target Cells Unknown Slight   Elliptocytes Unknown Marked   [place] Cells Unknown Slight   Platelet Estimate Unknown Confirming automated cell count   % Retic Latest Ref Range: 0.5 - 2.0 % 28.1 (H)   Absolute Retic Latest Ref Range: 25 - 95 10e9/L 624.0 (H)     Imaging: none today    Assessment:  Jennifer Cervantes is a 20 year old female with HbSS. Her disease has been complicated by stroke leading to significant cognitive delay and right UE hemiparesis, high anxiety leading to frequent pain crises on top of chronic pain syndrome, poor social structure at home with no real support, and iron overload secondary to repeated transfusions. She is had had a rough week or two for pain control but is a bit better now.      Plan:  1) HbSS and complications   -- Pain plan in chart. Not reviewed in this visit. reviewed and updated.    Recommended trying 400-600 ibuprofen when pain is moderate to fill in gaps from oxycodone. Tylenol is okay for mild pain but may not directly affect SCD pathophysiology in the same way as ibuprofen              --Need to  readdress her BMT visit at a later time. .   --She had been on q6 week transfusions (exchanged 4/7) Last transfusion early May. Had CVA ~2 yo and another TIA ~2014 during a trial off transfusions and just HU. My goal is to ultimately stop the transfusions given her significant iron overload but she has taken some time to become comfortable with this. She is ~3 months out from the exchange.   --Needs aggressive chelation. Jadenu now 1440 mg daily and well-tolerated per report but denies current use, consistent with rising ferritin. Also need to have audiology appt when COVID restrictions zaid. Referral placed recently.   --Continue ASA post-stroke  2) Labs: CBC, CMP, ferritin (still slowly rising after a drop for a few months with increased-dose Jadenu   3) Medication Changes: Oxycodone refilled recently.  5)  HCM: PCP is Daya Carter. Need to get SW closely involved.   6)  Psych: 200 mg Zoloft   7)  Facial numbness/tingling: secondary to apheresis line 2 months ago. Recommended OTC 4% lidocaine (Aspercreme) PRN  8) Next visit: 2 weeks in person    It was a pleasure talking to Jennifer today and in continuing to guide her care and transition to adult care moving forward.    I spent a total of 8 minutes on the phone with Jennifer during today's office visit. See note for details.        Eric Duncan MD  Hematologist  Division of Hematology, Oncology, and Transplantation  [facility]  [facility]  Pager: [phone]

## 2020-07-22 ENCOUNTER — INFUSION THERAPY VISIT (OUTPATIENT)
Dept: INFUSION THERAPY | Facility: CLINIC | Age: 20
End: 2020-07-22
Attending: PEDIATRICS
Payer: COMMERCIAL

## 2020-07-22 VITALS
RESPIRATION RATE: 16 BRPM | SYSTOLIC BLOOD PRESSURE: 129 MMHG | OXYGEN SATURATION: 96 % | DIASTOLIC BLOOD PRESSURE: 73 MMHG | TEMPERATURE: 98.2 F | HEART RATE: 82 BPM

## 2020-07-22 DIAGNOSIS — D57.00 SICKLE CELL PAIN CRISIS (H): Primary | ICD-10-CM

## 2020-07-22 PROCEDURE — 25000128 H RX IP 250 OP 636: Mod: ZF | Performed by: PHYSICIAN ASSISTANT

## 2020-07-22 PROCEDURE — 96376 TX/PRO/DX INJ SAME DRUG ADON: CPT

## 2020-07-22 PROCEDURE — 96374 THER/PROPH/DIAG INJ IV PUSH: CPT

## 2020-07-22 PROCEDURE — 25800030 ZZH RX IP 258 OP 636: Mod: ZF | Performed by: PHYSICIAN ASSISTANT

## 2020-07-22 PROCEDURE — 96361 HYDRATE IV INFUSION ADD-ON: CPT

## 2020-07-22 PROCEDURE — 25000128 H RX IP 250 OP 636: Mod: ZF | Performed by: PEDIATRICS

## 2020-07-22 RX ORDER — HEPARIN SODIUM (PORCINE) LOCK FLUSH IV SOLN 100 UNIT/ML 100 UNIT/ML
5 SOLUTION INTRAVENOUS
Status: CANCELLED | OUTPATIENT
Start: 2020-07-22

## 2020-07-22 RX ORDER — DIPHENHYDRAMINE HCL 25 MG
25 CAPSULE ORAL
Status: DISCONTINUED | OUTPATIENT
Start: 2020-07-22 | End: 2020-07-22 | Stop reason: HOSPADM

## 2020-07-22 RX ORDER — DIPHENHYDRAMINE HCL 25 MG
25 CAPSULE ORAL
Status: CANCELLED
Start: 2020-07-22

## 2020-07-22 RX ORDER — HEPARIN SODIUM (PORCINE) LOCK FLUSH IV SOLN 100 UNIT/ML 100 UNIT/ML
5 SOLUTION INTRAVENOUS
Status: DISCONTINUED | OUTPATIENT
Start: 2020-07-22 | End: 2020-07-22 | Stop reason: HOSPADM

## 2020-07-22 RX ORDER — MORPHINE SULFATE 2 MG/ML
2 INJECTION, SOLUTION INTRAMUSCULAR; INTRAVENOUS
Status: CANCELLED
Start: 2020-07-22

## 2020-07-22 RX ORDER — ONDANSETRON 8 MG/1
8 TABLET, ORALLY DISINTEGRATING ORAL
Status: COMPLETED | OUTPATIENT
Start: 2020-07-22 | End: 2020-07-22

## 2020-07-22 RX ORDER — MORPHINE SULFATE 2 MG/ML
2 INJECTION, SOLUTION INTRAMUSCULAR; INTRAVENOUS
Status: DISCONTINUED | OUTPATIENT
Start: 2020-07-22 | End: 2020-07-22 | Stop reason: HOSPADM

## 2020-07-22 RX ORDER — ONDANSETRON 8 MG/1
8 TABLET, FILM COATED ORAL
Status: CANCELLED
Start: 2020-07-22

## 2020-07-22 RX ORDER — HEPARIN SODIUM,PORCINE 10 UNIT/ML
5 VIAL (ML) INTRAVENOUS
Status: CANCELLED | OUTPATIENT
Start: 2020-07-22

## 2020-07-22 RX ADMIN — Medication 5 ML: at 16:40

## 2020-07-22 RX ADMIN — DEXTROSE AND SODIUM CHLORIDE 500 ML: 5; 450 INJECTION, SOLUTION INTRAVENOUS at 14:26

## 2020-07-22 RX ADMIN — MORPHINE SULFATE 2 MG: 2 INJECTION, SOLUTION INTRAMUSCULAR; INTRAVENOUS at 14:28

## 2020-07-22 RX ADMIN — ONDANSETRON 8 MG: 8 TABLET, ORALLY DISINTEGRATING ORAL at 14:26

## 2020-07-22 RX ADMIN — MORPHINE SULFATE 2 MG: 2 INJECTION, SOLUTION INTRAMUSCULAR; INTRAVENOUS at 15:28

## 2020-07-22 RX ADMIN — MORPHINE SULFATE 2 MG: 2 INJECTION, SOLUTION INTRAMUSCULAR; INTRAVENOUS at 16:36

## 2020-07-22 NOTE — PROGRESS NOTES
Nursing Note  Jennifer Cervantes [facility] for:   Chief Complaint   Patient presents with     Infusion     IVF, pain meds     During today's [facility] appointment, orders from Andrei Machado were completed.  Frequency: prn    Progress note:  Patient identification verified by name and date of birth.  Assessment completed.  Vitals recorded in Doc Flowsheets.  Patient was provided with education regarding medication/procedure and possible side effects.  Patient verbalized understanding.     present during visit today: Not Applicable.    Treatment Conditions: Non-applicable.    Premedications: were not ordered.    Drug Waste Record: No    Infusion length and rate:  infusion given over approximately 2 hours at 250 ml/hr.    Labs: were not ordered for this appointment.    Vascular access: port accessed today.    Post Infusion Assessment:  Report given to Pavithra JOHNSON RN. Care transferred at 1538.     Discharge Plan:   Follow up plan of care with: ordering provider as scheduled.  Discharge instructions were reviewed with patient.  Patient/representative verbalized understanding of discharge instructions and all questions answered.  Patient discharged [facility] in stable condition.    Malgorzata Glass RN    Administrations This Visit     dextrose 5% and 0.45% NaCl BOLUS     Admin Date  07/22/2020 Action  New Bag Dose  500 mL Rate  250 mL/hr Route  Intravenous Administered By  Malgorzata Glass RN          morphine (PF) injection 2 mg     Admin Date  07/22/2020 Action  Given Dose  2 mg Route  Intravenous Administered By  Malgorzata Glass RN           Admin Date  07/22/2020 Action  Given Dose  2 mg Route  Intravenous Administered By  Malgorzata Glass, JOE          ondansetron (ZOFRAN-ODT) ODT tab 8 mg     Admin Date  07/22/2020 Action  Given Dose  8 mg Route  Oral Administered By  Malgorzata Glass RN                 /64   Pulse 82   Temp 98.2  F (36.8  C) (Oral)   Resp 16   LMP 06/29/2020

## 2020-07-22 NOTE — LETTER
7/22/2020         RE: Jennifer Cervantes  [address]  [place] [address]        Dear Colleague,    Thank you for referring your patient, Jennifer Cervantes, to the [facility] [place] [facility]. Please see a copy of my visit note below.    Nursing Note  Jennifer Cervantes [facility] for:   Chief Complaint   Patient presents with     Infusion     IVF, pain meds     During [facility]'s [facility] appointment, orders from Andrei Machado were completed.  Frequency: prn    Progress note:  Patient identification verified by name and date of birth.  Assessment completed.  Vitals recorded in Doc Flowsheets.  Patient was provided with education regarding medication/procedure and possible side effects.  Patient verbalized understanding.     present during visit today: Not Applicable.    Treatment Conditions: Non-applicable.    Premedications: were not ordered.    Drug Waste Record: No    Infusion length and rate:  infusion given over approximately 2 hours at 250 ml/hr.    Labs: were not ordered for this appointment.    Vascular access: port accessed today.    Post Infusion Assessment:  Report given to Pavithra JOHNSON RN. Care transferred at 1538.     Discharge Plan:   Follow up plan of care with: ordering provider as scheduled.  Discharge instructions were reviewed with patient.  Patient/representative verbalized understanding of discharge instructions and all questions answered.  [facility] discharged [facility] in stable condition.    Malgorzata Glass RN    Administrations This Visit     dextrose 5% and 0.45% NaCl BOLUS     Admin Date  07/22/2020 Action  New Bag Dose  500 mL Rate  250 mL/hr Route  Intravenous Administered By  Malgorzata Glass, RN          morphine (PF) injection 2 mg     Admin Date  07/22/2020 Action  Given Dose  2 mg Route  Intravenous Administered By  Malgorzata Glass,  RN           Admin Date  07/22/2020 Action  Given Dose  2 mg Route  Intravenous Administered By  Malgorzata Holcomb RN          ondansetron (ZOFRAN-ODT) ODT tab 8 mg     Admin Date  07/22/2020 Action  Given Dose  8 mg Route  Oral Administered By  Malgorzata Holcomb RN                /64   Pulse 82   Temp 98.2  F (36.8  C) (Oral)   Resp 16   LMP 06/29/2020       Again, thank you for allowing me to participate in the care of your patient.        Sincerely,        [facility]

## 2020-07-22 NOTE — PATIENT INSTRUCTIONS
Dear Jennifer Cervantes    Thank you for choosing [facility] ([facility]) for your infusion.  The following information is a summary of our appointment as well as important reminders.      We look forward in seeing you on your next appointment here at [facility] ([facility]).  Please don t hesitate to call us at [phone] to reschedule any of your appointments or to speak with one of the [facility] registered nurses.  It was a pleasure taking care of you today.    Sincerely,    [facility]  [facility]  [address] [place] [address]  [address]  Phone:  [phone]

## 2020-07-27 DIAGNOSIS — D57.1 HB-SS DISEASE WITHOUT CRISIS (H): Primary | ICD-10-CM

## 2020-07-29 ENCOUNTER — OFFICE VISIT (OUTPATIENT)
Dept: AUDIOLOGY | Facility: CLINIC | Age: 20
End: 2020-07-29
Payer: COMMERCIAL

## 2020-07-29 ENCOUNTER — OFFICE VISIT (OUTPATIENT)
Dept: OPHTHALMOLOGY | Facility: CLINIC | Age: 20
End: 2020-07-29
Attending: PEDIATRICS
Payer: COMMERCIAL

## 2020-07-29 ENCOUNTER — PATIENT OUTREACH (OUTPATIENT)
Dept: ONCOLOGY | Facility: CLINIC | Age: 20
End: 2020-07-29

## 2020-07-29 DIAGNOSIS — Z51.81 ENCOUNTER FOR MONITORING OTOTOXIC DRUG THERAPY: Primary | ICD-10-CM

## 2020-07-29 DIAGNOSIS — Z79.899 ENCOUNTER FOR MONITORING OTOTOXIC DRUG THERAPY: Primary | ICD-10-CM

## 2020-07-29 DIAGNOSIS — H52.03 HYPERMETROPIA OF BOTH EYES: ICD-10-CM

## 2020-07-29 DIAGNOSIS — D57.1 HB-SS DISEASE WITHOUT CRISIS (H): ICD-10-CM

## 2020-07-29 DIAGNOSIS — E83.111 HEMOCHROMATOSIS AFTER MULTIPLE RED BLOOD CELL TRANSFUSIONS: Primary | ICD-10-CM

## 2020-07-29 LAB
ALBUMIN SERPL-MCNC: 4.1 G/DL (ref 3.4–5)
ALP SERPL-CCNC: 75 U/L (ref 40–150)
ALT SERPL W P-5'-P-CCNC: 78 U/L (ref 0–50)
ANION GAP SERPL CALCULATED.3IONS-SCNC: 7 MMOL/L (ref 3–14)
ANISOCYTOSIS BLD QL SMEAR: ABNORMAL
AST SERPL W P-5'-P-CCNC: 90 U/L (ref 0–45)
BASOPHILS # BLD AUTO: 0.1 10E9/L (ref 0–0.2)
BASOPHILS NFR BLD AUTO: 0.9 %
BILIRUB SERPL-MCNC: 4.7 MG/DL (ref 0.2–1.3)
BUN SERPL-MCNC: 8 MG/DL (ref 7–30)
CALCIUM SERPL-MCNC: 8.6 MG/DL (ref 8.5–10.1)
CHLORIDE SERPL-SCNC: 108 MMOL/L (ref 94–109)
CO2 SERPL-SCNC: 23 MMOL/L (ref 20–32)
CREAT SERPL-MCNC: 0.54 MG/DL (ref 0.52–1.04)
DIFFERENTIAL METHOD BLD: ABNORMAL
EOSINOPHIL # BLD AUTO: 0.2 10E9/L (ref 0–0.7)
EOSINOPHIL NFR BLD AUTO: 1.7 %
ERYTHROCYTE [DISTWIDTH] IN BLOOD BY AUTOMATED COUNT: 23.8 % (ref 10–15)
FERRITIN SERPL-MCNC: 9878 NG/ML (ref 12–150)
GFR SERPL CREATININE-BSD FRML MDRD: >90 ML/MIN/{1.73_M2}
GLUCOSE SERPL-MCNC: 81 MG/DL (ref 70–99)
HCT VFR BLD AUTO: 20.8 % (ref 35–47)
HGB BLD-MCNC: 7.7 G/DL (ref 11.7–15.7)
LYMPHOCYTES # BLD AUTO: 1.5 10E9/L (ref 0.8–5.3)
LYMPHOCYTES NFR BLD AUTO: 12.1 %
MCH RBC QN AUTO: 33.3 PG (ref 26.5–33)
MCHC RBC AUTO-ENTMCNC: 37 G/DL (ref 31.5–36.5)
MCV RBC AUTO: 90 FL (ref 78–100)
METAMYELOCYTES # BLD: 0.2 10E9/L
METAMYELOCYTES NFR BLD MANUAL: 1.7 %
MONOCYTES # BLD AUTO: 1.1 10E9/L (ref 0–1.3)
MONOCYTES NFR BLD AUTO: 8.6 %
NEUTROPHILS # BLD AUTO: 9.6 10E9/L (ref 1.6–8.3)
NEUTROPHILS NFR BLD AUTO: 75 %
NRBC # BLD AUTO: 1.1 10*3/UL
NRBC BLD AUTO-RTO: 9 /100
OVALOCYTES BLD QL SMEAR: SLIGHT
PLATELET # BLD AUTO: 267 10E9/L (ref 150–450)
PLATELET # BLD EST: ABNORMAL 10*3/UL
POIKILOCYTOSIS BLD QL SMEAR: ABNORMAL
POLYCHROMASIA BLD QL SMEAR: SLIGHT
POTASSIUM SERPL-SCNC: 3.7 MMOL/L (ref 3.4–5.3)
PROT SERPL-MCNC: 7.7 G/DL (ref 6.8–8.8)
RBC # BLD AUTO: 2.31 10E12/L (ref 3.8–5.2)
RETICS # AUTO: 603.4 10E9/L (ref 25–95)
RETICS/RBC NFR AUTO: 26.4 % (ref 0.5–2)
SICKLE CELLS BLD QL SMEAR: ABNORMAL
SODIUM SERPL-SCNC: 138 MMOL/L (ref 133–144)
TARGETS BLD QL SMEAR: ABNORMAL
WBC # BLD AUTO: 12.8 10E9/L (ref 4–11)

## 2020-07-29 PROCEDURE — 85025 COMPLETE CBC W/AUTO DIFF WBC: CPT | Performed by: PEDIATRICS

## 2020-07-29 PROCEDURE — 85045 AUTOMATED RETICULOCYTE COUNT: CPT | Performed by: PEDIATRICS

## 2020-07-29 PROCEDURE — 80053 COMPREHEN METABOLIC PANEL: CPT | Performed by: PEDIATRICS

## 2020-07-29 PROCEDURE — 25000128 H RX IP 250 OP 636: Performed by: PEDIATRICS

## 2020-07-29 PROCEDURE — 82728 ASSAY OF FERRITIN: CPT | Performed by: PEDIATRICS

## 2020-07-29 RX ORDER — HEPARIN SODIUM (PORCINE) LOCK FLUSH IV SOLN 100 UNIT/ML 100 UNIT/ML
5 SOLUTION INTRAVENOUS ONCE
Status: COMPLETED | OUTPATIENT
Start: 2020-07-29 | End: 2020-07-29

## 2020-07-29 RX ADMIN — Medication 5 ML: at 13:59

## 2020-07-29 ASSESSMENT — SLIT LAMP EXAM - LIDS
COMMENTS: NORMAL
COMMENTS: NORMAL

## 2020-07-29 ASSESSMENT — CONF VISUAL FIELD
OS_NORMAL: 1
OD_NORMAL: 1
METHOD: COUNTING FINGERS

## 2020-07-29 ASSESSMENT — VISUAL ACUITY
METHOD: SNELLEN - LINEAR
OS_SC: 20/20
OD_SC: 20/20

## 2020-07-29 ASSESSMENT — REFRACTION_MANIFEST
OS_CYLINDER: SPHERE
OS_SPHERE: PLANO
OD_CYLINDER: SPHERE
OD_SPHERE: +0.50

## 2020-07-29 ASSESSMENT — TONOMETRY
IOP_METHOD: ICARE
OD_IOP_MMHG: 19
OS_IOP_MMHG: 18

## 2020-07-29 ASSESSMENT — CUP TO DISC RATIO
OS_RATIO: 0.3
OD_RATIO: 0.3

## 2020-07-29 ASSESSMENT — EXTERNAL EXAM - LEFT EYE: OS_EXAM: NORMAL

## 2020-07-29 ASSESSMENT — EXTERNAL EXAM - RIGHT EYE: OD_EXAM: NORMAL

## 2020-07-29 NOTE — NURSING NOTE
Chief Complaints and History of Present Illnesses   Patient presents with     COMPREHENSIVE EYE EXAM     Chief Complaint(s) and History of Present Illness(es)     COMPREHENSIVE EYE EXAM     Laterality: both eyes    Onset: years ago    Frequency: constantly    Course: stable    Associated symptoms: Negative for floaters and flashes    Treatments tried: no treatments    Pain scale: 0/10              Comments     Annual exam- last eye exam last year. Pt states no changes to vision- wears glasses but did not bring today. No pain. No drops.    Juan F Bingham, SUSAN COT 12:58 PM July 29, 2020

## 2020-07-29 NOTE — NURSING NOTE
Chief Complaint   Patient presents with     Labs Only     labs drawn via port by RN in lab     LMP 06/29/2020     Port accessed by RN in lab. Labs collected and sent. Pt tolerated well. Line flushed with Normal Saline & Heparin.     Kaia Luu RN

## 2020-07-29 NOTE — PROGRESS NOTES
Writer placed call to patient to discuss need for labs, adherence to her HU and Jadenu regimen, and her current pain level. Patient states that she tried calling the pharmacy regarding delivery of her Jadenu but hasn't heard back. Writer called pharmacy and provided them with updated phone number and asked that a call be placed to her this morning to arrange for med delivery or transfer to pharmacy in [facility] as she will be there this afternoon. Patient also reported that she is having pain increasing and was hoping to get infusion or if she should go to ER for IVF/pain meds. She is having typical sickle pain not relieved by home meds and ibuprofen/tylenol. She is unable to come today for IVF/pain meds as she has hearing and opthalm appointments that need to be kept. He has agreed to try to manage at home and message will be sent to charge nurse to see if she can be added on for tomorrow. Patient voiced understanding of need to call clinic back to see if she is able to get in tomorrow. Dr Duncan notified of plan and in agreement.

## 2020-07-29 NOTE — LETTER
7/29/2020       RE: Jennifer Cervantes  [address]  [facility] [address]     Dear Colleague,    Thank you for referring your patient, Jennifer Cervantes, to the [facility] OPHTHALMOLOGY at [facility]. Please see a copy of my visit note below.    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 Patient has sickle cell disease.   Plan: Discussed findings with patient. Plan on monitoring annually, sooner with any vision changes- especially new flashes/floaters/curtain over vision.     (H52.03) Hypermetropia of both eyes  Comment: Mild Rx   Plan: REFRACTION [19774]        Updated Rx for patient for computer and near work. Monitor.       Complete documentation of historical and exam elements from today's encounter can  be found in the full encounter summary report (not reduplicated in this progress  note). I personally obtained the chief complaint(s) and history of present illness. I  confirmed and edited as necessary the review of systems, past medical/surgical  history, family history, social history, and examination findings as documented by  others; and I examined the patient myself. I personally reviewed the relevant tests,  images, and reports as documented above. I formulated and edited as necessary the  assessment and plan and discussed the findings and management plan with the  patient and family.    Usama Mcclendon OD

## 2020-07-29 NOTE — PROGRESS NOTES
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 Patient has sickle cell disease.   Plan: Discussed findings with patient. Plan on monitoring annually, sooner with any vision changes- especially new flashes/floaters/curtain over vision.     (H52.03) Hypermetropia of both eyes  Comment: Mild Rx   Plan: REFRACTION [48410]        Updated Rx for patient for computer and near work. Monitor.       Complete documentation of historical and exam elements from today's encounter can  be found in the full encounter summary report (not reduplicated in this progress  note). I personally obtained the chief complaint(s) and history of present illness. I  confirmed and edited as necessary the review of systems, past medical/surgical  history, family history, social history, and examination findings as documented by  others; and I examined the patient myself. I personally reviewed the relevant tests,  images, and reports as documented above. I formulated and edited as necessary the  assessment and plan and discussed the findings and management plan with the  patient and family.    Usama Mcclendon, OD

## 2020-07-29 NOTE — PROGRESS NOTES
AUDIOLOGY REPORT    SUBJECTIVE:  Jennifer Cervantes is a 20 year old female who was seen in the Audiology Clinic at the [facility] for audiologic evaluation, referred by Eric Duncan M.D. 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 She is currently on Jadenu and Oxycodone with a recent dose increase. She was recently seen by the ED with sickle cell pain crisis, reporting bilateral arm and leg pain. Additional medical history includes significant anxiety, depression, and chronic transfusions post-stroke. She denies hearing difficulties, changes in her hearing, otalgia, otorrhea, aural fullness, tinnitus, dizziness and history of otologic surgeries.       OBJECTIVE:  Abuse Screening:  Do you feel unsafe at home or work/school? No  Do you feel threatened by someone? No  Does anyone try to keep you from having contact with others, or doing things outside of your home? No  Physical signs of abuse present? No    Otoscopic exam indicated ears are clear of cerumen bilaterally     Pure Tone Thresholds assessed using conventional audiometry with good, reliability from 250-8000 Hz bilaterally using insert earphones, and circumaural headphones     RIGHT: Normal hearing sensitivity     LEFT:   Normal hearing sensitivity     High frequency audiometry from 9,000-20,000 Hz was performed.   Right Ear: Outside age range norms (9k-16k Hz), no normative data for 18k-20k Hz.   Left Ear: Within age range norms (9k-16k Hz), no normative data for 18k-20k Hz.     Distortion product otoacoustic emissions were performed from 1500 Hz to 10,000 Hz and were present 0208-1812 Hz and absent 6000-10,000 Hz, bilaterally.    Tympanogram:    RIGHT: normal eardrum mobility    LEFT:  normal eardrum mobility    Reflexes (reported by stimulus ear):  RIGHT: Ipsilateral: present at normal levels  RIGHT: Contralateral: present at normal levels  LEFT:    Ipsilateral: present at normal levels  LEFT:   Contralateral: present at normal levels      Speech Reception Threshold:    RIGHT: 5 dB HL, aligns with PTA    LEFT:   0 dB HL, aligns with PTA  Word Recognition Score:     RIGHT: 100% at 55 dB HL using NU-6 recorded word list.    LEFT:   100% at 55 dB HL using NU-6 recorded word list.      ASSESSMENT:   BASELINE AUDIOGRAM TODAY: Normal hearing sensitivity bilaterally (250-8000 Hz). Extended High Frequency revealed values outside the patient's normative age range in the right ear (9000-16,000 Hz) and values within the patient's normative age range in the left ear (9000-16,000 Hz); no normative data for 18k-20,000 Hz. DPOAEs were present 2203-2477 Hz and absent 6000-10,000 Hz, bilaterally.  No previous audiogram is available to review. The grade of the patient's hearing loss today is Grade 0.     GRADE 1: Adults enrolled on a Monitoring  Program (on a 1, 2, 3, 4, 6 and  8 kHz audiogram): Threshold  shift of 15 - 25 dB averaged at 2  contiguous test frequencies in at  least one ear.  Adults not enrolled in Monitoring  Program: subjective change in  hearing in the absence of  documented hearing loss.    GRADE 2:Adults enrolled in Monitoring  Program (on a 1, 2, 3, 4, 6 and  8 kHz audiogram): Threshold  shift of >25 dB averaged at 2  contiguous test frequencies in at  least one ear.  Adults not enrolled in Monitoring  Program: hearing loss but  hearing aid or intervention not  indicated; limiting instrumental  ADL.    GRADE 3:  Adults enrolled in Monitoring  Program (on a 1, 2, 3, 4, 6 and  8 kHz audiogram): Threshold  shift of >25 dB averaged at 3  contiguous test frequencies in at  least one ear; therapeutic  intervention indicated.  Adults not enrolled in Monitoring  Program: hearing loss with  hearing aid or intervention  indicated; limiting self care ADL.    GRADE 4:  Adults: Decrease in hearing to  profound bilateral loss (absolute  threshold >80 dB HL at 2 kHz  and  above); non-servicable  Hearing.      CTCAE4.03 Scale: Grade 0 on the right  CTCAE4.03 Scale: Grade 0 on the left                                        PLAN: Patient was counseled regarding today's results. It is recommended that the patient return for testing based on physician protocol and recommendation. Please call this clinic with questions regarding these results or recommendations.      Magui Noe. CCC-A  Licensed Audiologist   [place] #[id number]

## 2020-07-30 ENCOUNTER — INFUSION THERAPY VISIT (OUTPATIENT)
Dept: INFUSION THERAPY | Facility: CLINIC | Age: 20
End: 2020-07-30
Attending: PEDIATRICS
Payer: COMMERCIAL

## 2020-07-30 ENCOUNTER — TELEPHONE (OUTPATIENT)
Dept: ONCOLOGY | Facility: CLINIC | Age: 20
End: 2020-07-30

## 2020-07-30 VITALS
HEART RATE: 79 BPM | RESPIRATION RATE: 16 BRPM | SYSTOLIC BLOOD PRESSURE: 124 MMHG | OXYGEN SATURATION: 95 % | TEMPERATURE: 97.9 F | DIASTOLIC BLOOD PRESSURE: 69 MMHG

## 2020-07-30 DIAGNOSIS — D57.00 SICKLE CELL PAIN CRISIS (H): Primary | ICD-10-CM

## 2020-07-30 DIAGNOSIS — D57.1 HB-SS DISEASE WITHOUT CRISIS (H): ICD-10-CM

## 2020-07-30 PROCEDURE — 25000128 H RX IP 250 OP 636: Mod: ZF | Performed by: PHYSICIAN ASSISTANT

## 2020-07-30 PROCEDURE — 25000128 H RX IP 250 OP 636: Mod: ZF | Performed by: PEDIATRICS

## 2020-07-30 PROCEDURE — 25800030 ZZH RX IP 258 OP 636: Mod: ZF | Performed by: PHYSICIAN ASSISTANT

## 2020-07-30 PROCEDURE — 96376 TX/PRO/DX INJ SAME DRUG ADON: CPT

## 2020-07-30 PROCEDURE — 96361 HYDRATE IV INFUSION ADD-ON: CPT

## 2020-07-30 PROCEDURE — 96374 THER/PROPH/DIAG INJ IV PUSH: CPT

## 2020-07-30 RX ORDER — OXYCODONE HYDROCHLORIDE 10 MG/1
10 TABLET, FILM COATED, EXTENDED RELEASE ORAL EVERY 12 HOURS
Qty: 60 TABLET | Refills: 0 | Status: SHIPPED | OUTPATIENT
Start: 2020-07-30 | End: 2020-08-17

## 2020-07-30 RX ORDER — HEPARIN SODIUM (PORCINE) LOCK FLUSH IV SOLN 100 UNIT/ML 100 UNIT/ML
5 SOLUTION INTRAVENOUS
Status: CANCELLED | OUTPATIENT
Start: 2020-07-30

## 2020-07-30 RX ORDER — MORPHINE SULFATE 2 MG/ML
2 INJECTION, SOLUTION INTRAMUSCULAR; INTRAVENOUS
Status: CANCELLED
Start: 2020-07-30

## 2020-07-30 RX ORDER — HEPARIN SODIUM,PORCINE 10 UNIT/ML
5 VIAL (ML) INTRAVENOUS
Status: CANCELLED | OUTPATIENT
Start: 2020-07-30

## 2020-07-30 RX ORDER — HEPARIN SODIUM (PORCINE) LOCK FLUSH IV SOLN 100 UNIT/ML 100 UNIT/ML
5 SOLUTION INTRAVENOUS
Status: DISCONTINUED | OUTPATIENT
Start: 2020-07-30 | End: 2020-07-30 | Stop reason: HOSPADM

## 2020-07-30 RX ORDER — DIPHENHYDRAMINE HCL 25 MG
25 CAPSULE ORAL
Status: CANCELLED
Start: 2020-07-30

## 2020-07-30 RX ORDER — ONDANSETRON 4 MG/1
8 TABLET, ORALLY DISINTEGRATING ORAL
Status: DISCONTINUED | OUTPATIENT
Start: 2020-07-30 | End: 2020-07-30 | Stop reason: HOSPADM

## 2020-07-30 RX ORDER — MORPHINE SULFATE 2 MG/ML
2 INJECTION, SOLUTION INTRAMUSCULAR; INTRAVENOUS
Status: DISCONTINUED | OUTPATIENT
Start: 2020-07-30 | End: 2020-07-30 | Stop reason: HOSPADM

## 2020-07-30 RX ORDER — ONDANSETRON 8 MG/1
8 TABLET, FILM COATED ORAL
Status: CANCELLED
Start: 2020-07-30

## 2020-07-30 RX ADMIN — MORPHINE SULFATE 2 MG: 2 INJECTION, SOLUTION INTRAMUSCULAR; INTRAVENOUS at 12:10

## 2020-07-30 RX ADMIN — DEXTROSE AND SODIUM CHLORIDE 500 ML: 5; 450 INJECTION, SOLUTION INTRAVENOUS at 11:12

## 2020-07-30 RX ADMIN — Medication 5 ML: at 14:02

## 2020-07-30 RX ADMIN — MORPHINE SULFATE 2 MG: 2 INJECTION, SOLUTION INTRAMUSCULAR; INTRAVENOUS at 13:12

## 2020-07-30 RX ADMIN — MORPHINE SULFATE 2 MG: 2 INJECTION, SOLUTION INTRAMUSCULAR; INTRAVENOUS at 11:08

## 2020-07-30 NOTE — PATIENT INSTRUCTIONS
Dear Jennifer Cervantes    Thank you for choosing [facility] ([facility]) for your infusion.  The following information is a summary of our appointment as well as important reminders.      We look forward in seeing you on your next appointment here at [facility] ([facility]).  Please don t hesitate to call us at [phone] to reschedule any of your appointments or to speak with one of the [facility] registered nurses.  It was a pleasure taking care of you today.    Sincerely,    [facility]  [facility]  [address] [place] [address]  [address]  Phone:  [phone]

## 2020-07-30 NOTE — LETTER
7/30/2020         RE: Jennifer Cervantes  [address]  [place] [address]        Dear Colleague,    Thank you for referring your patient, Jennifer Cervantes, to the [facility]. Please see a copy of my visit note below.    Nursing Note  Jennifer Cervantes presents today [facility] for:   Chief Complaint   Patient presents with     Infusion     IVF, pain meds     During today's [facility] appointment, orders from RONALDO Hill were completed.  Frequency: prn    Progress note:  Patient identification verified by name and date of birth.  Assessment completed.  Vitals recorded in Doc Flowsheets.  Patient was provided with education regarding medication/procedure and possible side effects.  Patient verbalized understanding.     present during visit today: Not Applicable.    Treatment Conditions: Non-applicable.    Premedications: declined zofran and benadryl    Drug Waste Record: No    Infusion length and rate:  infusion given over approximately 2 hours   at 250 ml/hr.    Labs: were not ordered for this appointment, drawn yesterday.     Vascular access: port accessed today.    Post Infusion Assessment:  Patient's pain down from 9/10 upon arrival to 7/10 upon discharge. Complained of some dizziness after third dose of morphine, observed for approximately 1 hour after third dose with improvement in dizziness. VSS. Patient wanting to discharge home, advised if worsening in symptoms to seek medical follow-up. Patient verbalized understanding and reports feeling safe to discharge home. Discharged from [facility] in stable condition with ride home.    Discharge Plan:   Follow up plan of care with: ordering provider as scheduled. and after visit summary declined by patient  Discharge instructions were reviewed with patient.  Patient/representative verbalized understanding of discharge instructions and all  questions answered.  Patient discharged from [facility] in stable condition.    Malgorzata Holcomb RN       Administrations This Visit     dextrose 5% and 0.45% NaCl BOLUS     Admin Date  07/30/2020 Action  New Bag Dose  500 mL Rate  250 mL/hr Route  Intravenous Administered By  Malgorzata Holcomb RN          heparin 100 UNIT/ML injection 5 mL     Admin Date  07/30/2020 Action  Given Dose  5 mL Route  Intracatheter Administered By  Malgorzata Holcomb RN          morphine (PF) injection 2 mg     Admin Date  07/30/2020 Action  Given Dose  2 mg Route  Intravenous Administered By  Malgorzata Holcomb RN           Admin Date  07/30/2020 Action  Given Dose  2 mg Route  Intravenous Administered By  Malgorzata Holcomb RN           Admin Date  07/30/2020 Action  Given Dose  2 mg Route  Intravenous Administered By  Malgorzata Holcomb RN                /69   Pulse 79   Temp 97.9  F (36.6  C) (Oral)   Resp 16   SpO2 95%       Again, thank you for allowing me to participate in the care of your patient.        Sincerely,        [facility]

## 2020-07-30 NOTE — PROGRESS NOTES
Nursing Note  Jennifer Cervantes presents today [facility] for:   Chief Complaint   Patient presents with     Infusion     IVF, pain meds     During today's [facility] appointment, orders from RONALDO Hill were completed.  Frequency: prn    Progress note:  Patient identification verified by name and date of birth.  Assessment completed.  Vitals recorded in Doc Flowsheets.  Patient was provided with education regarding medication/procedure and possible side effects.  Patient verbalized understanding.     present during visit today: Not Applicable.    Treatment Conditions: Non-applicable.    Premedications: declined zofran and benadryl    Drug Waste Record: No    Infusion length and rate:  infusion given over approximately 2 hours   at 250 ml/hr.    Labs: were not ordered for this appointment, drawn yesterday.     Vascular access: port accessed today.    Post Infusion Assessment:  Patient's pain down from 9/10 upon arrival to 7/10 upon discharge. Complained of some dizziness after third dose of morphine, observed for approximately 1 hour after third dose with improvement in dizziness. VSS. Patient wanting to discharge home, advised if worsening in symptoms to seek medical follow-up. Patient verbalized understanding and reports feeling safe to discharge home. Discharged from [facility] in stable condition with ride home.    Discharge Plan:   Follow up plan of care with: ordering provider as scheduled. and after visit summary declined by patient  Discharge instructions were reviewed with patient.  Patient/representative verbalized understanding of discharge instructions and all questions answered.  Patient discharged from [facility] in stable condition.    Malgorzata Glass RN       Administrations This Visit     dextrose 5% and 0.45% NaCl BOLUS     Admin Date  07/30/2020 Action  New Bag Dose  500 mL Rate  250 mL/hr  Route  Intravenous Administered By  Malgorzata Holcomb RN          heparin 100 UNIT/ML injection 5 mL     Admin Date  07/30/2020 Action  Given Dose  5 mL Route  Intracatheter Administered By  Malgorzata Holcomb RN          morphine (PF) injection 2 mg     Admin Date  07/30/2020 Action  Given Dose  2 mg Route  Intravenous Administered By  Malgorzata Holcomb RN           Admin Date  07/30/2020 Action  Given Dose  2 mg Route  Intravenous Administered By  Malgorzata Holcomb RN           Admin Date  07/30/2020 Action  Given Dose  2 mg Route  Intravenous Administered By  Malgorzata Holcomb RN                /69   Pulse 79   Temp 97.9  F (36.6  C) (Oral)   Resp 16   SpO2 95%

## 2020-07-30 NOTE — TELEPHONE ENCOUNTER
Pt called in to triage requesting IVF pain meds for back pain rated 8.5 out of 10. She stated this had been going on for several weeks. Stated she last took prn Oxy 10mg at 5 am and Oxycontin 10 mg at 8 am without relief. Denied fevers, chills, cough, chest pain, sob or other symptoms. Last infusion was 7/22 and clinic visit was 7/21, pt qualifies for sickle cell standing order protocol.     Pt scheduled for 10:30 am in [facility] infusion per charge kvng Gomez informed and scheduling messaged.

## 2020-07-31 ENCOUNTER — HOSPITAL ENCOUNTER (EMERGENCY)
Facility: CLINIC | Age: 20
Discharge: HOME | End: 2020-08-01
Attending: EMERGENCY MEDICINE | Admitting: EMERGENCY MEDICINE
Payer: COMMERCIAL

## 2020-07-31 ENCOUNTER — TELEPHONE (OUTPATIENT)
Dept: ONCOLOGY | Facility: CLINIC | Age: 20
End: 2020-07-31

## 2020-07-31 ENCOUNTER — PATIENT OUTREACH (OUTPATIENT)
Dept: ONCOLOGY | Facility: CLINIC | Age: 20
End: 2020-07-31

## 2020-07-31 DIAGNOSIS — D64.9 ANEMIA, UNSPECIFIED TYPE: ICD-10-CM

## 2020-07-31 DIAGNOSIS — D57.00 SICKLE CELL PAIN CRISIS (H): ICD-10-CM

## 2020-07-31 PROCEDURE — 99285 EMERGENCY DEPT VISIT HI MDM: CPT | Mod: Z6 | Performed by: EMERGENCY MEDICINE

## 2020-07-31 RX ORDER — HEPARIN SODIUM,PORCINE 10 UNIT/ML
5-10 VIAL (ML) INTRAVENOUS
Status: DISCONTINUED | OUTPATIENT
Start: 2020-07-31 | End: 2020-08-01 | Stop reason: HOSPADM

## 2020-07-31 RX ORDER — HEPARIN SODIUM (PORCINE) LOCK FLUSH IV SOLN 100 UNIT/ML 100 UNIT/ML
5 SOLUTION INTRAVENOUS
Status: DISCONTINUED | OUTPATIENT
Start: 2020-07-31 | End: 2020-08-01 | Stop reason: HOSPADM

## 2020-07-31 RX ORDER — HEPARIN SODIUM,PORCINE 10 UNIT/ML
5-10 VIAL (ML) INTRAVENOUS EVERY 24 HOURS
Status: DISCONTINUED | OUTPATIENT
Start: 2020-07-31 | End: 2020-08-01 | Stop reason: HOSPADM

## 2020-07-31 NOTE — ED AVS SNAPSHOT
[facility], [place], Emergency Department  [address] [facility] [address]  Phone:  [phone]                                    Jennifer Cervantes   MRN: [id number]    Department:  [facility], [place], Emergency Department   Date of Visit:  7/31/2020           After Visit Summary Signature Page    I have received my discharge instructions, and my questions have been answered. I have discussed any challenges I see with this plan with the nurse or doctor.    ..........................................................................................................................................  Patient/Patient Representative Signature      ..........................................................................................................................................  Patient Representative Print Name and Relationship to Patient    ..................................................               ................................................  Date                                   Time    ..........................................................................................................................................  Reviewed by Signature/Title    ...................................................              ..............................................  Date                                               Time          22EPIC Rev 08/18

## 2020-07-31 NOTE — TELEPHONE ENCOUNTER
[facility] Telephone Triage Note    The following symptoms were reported:     Description:            Onset:  A week or so ago   Location: back  Character: Sharp           Intensity: 8-9/10    Accompanying Signs & Symptoms:  none    Chest Pain:  no     Shortness of Breath:  no     Fever:  no     Chills:  no   Cough/sore throat:  no  Other:  No known exposures to covid    Therapies Tried and outcome: last toke home meds oxycodone @ 5 ,oxycontin @ 7    Improved by: nothing    The following provider was consulted:  Dr Duncan     The following advice/orders were given, and/or interventions recommended:  Pt is not approved to infuse today, Provider will talk to Julieta P and let triage know if any further action is needed.      Patient instructions and/or follow up:  Pt's RNCC will discuss with Pt or let triage know what needs to be communicated.

## 2020-07-31 NOTE — PROGRESS NOTES
Writer placed call to patient to review plan for today as discussed by writer with Dr Duncan. Patient stated that her infusion encounter from 7/30/2020 did not provide much relief from pain but was able to sleep overnight without taking her oxycodone IR. She had previously been taking her 10 mg oxycodone IR every 8 hours as instructed with last dose today at 0500. Per Dr Duncan, she should increase her next two doses to every 6 hours and if no relief, go to ER for pain management vs infusion today in [facility]. Writer instructed patient to set her phone alarm for 1100 and 1700 for next 2 oxycodone IR doses and if by 1900 she does not have adequate control of her pain, to go to ER. Writer also advised that increasing her oral fluid intake and adding some warm showers or heat pads as appropriate may be helpful. Jennifer voiced understanding of plan and was appreciative of call.

## 2020-08-01 VITALS
HEART RATE: 100 BPM | SYSTOLIC BLOOD PRESSURE: 120 MMHG | OXYGEN SATURATION: 94 % | TEMPERATURE: 98.1 F | DIASTOLIC BLOOD PRESSURE: 68 MMHG

## 2020-08-01 LAB
ALBUMIN SERPL-MCNC: 3.9 G/DL (ref 3.4–5)
ALBUMIN UR-MCNC: NEGATIVE MG/DL
ALP SERPL-CCNC: 76 U/L (ref 40–150)
ALT SERPL W P-5'-P-CCNC: 86 U/L (ref 0–50)
ANION GAP SERPL CALCULATED.3IONS-SCNC: 6 MMOL/L (ref 3–14)
ANISOCYTOSIS BLD QL SMEAR: ABNORMAL
APPEARANCE UR: CLEAR
AST SERPL W P-5'-P-CCNC: 109 U/L (ref 0–45)
BASOPHILS # BLD AUTO: 0.1 10E9/L (ref 0–0.2)
BASOPHILS NFR BLD AUTO: 0.8 %
BILIRUB DIRECT SERPL-MCNC: 0.4 MG/DL (ref 0–0.2)
BILIRUB SERPL-MCNC: 4.4 MG/DL (ref 0.2–1.3)
BILIRUB UR QL STRIP: NEGATIVE
BUN SERPL-MCNC: 8 MG/DL (ref 7–30)
CALCIUM SERPL-MCNC: 8.2 MG/DL (ref 8.5–10.1)
CHLORIDE SERPL-SCNC: 107 MMOL/L (ref 94–109)
CO2 SERPL-SCNC: 26 MMOL/L (ref 20–32)
COLOR UR AUTO: YELLOW
CREAT SERPL-MCNC: 0.64 MG/DL (ref 0.52–1.04)
DIFFERENTIAL METHOD BLD: ABNORMAL
EOSINOPHIL # BLD AUTO: 0.5 10E9/L (ref 0–0.7)
EOSINOPHIL NFR BLD AUTO: 4.2 %
ERYTHROCYTE [DISTWIDTH] IN BLOOD BY AUTOMATED COUNT: 25.2 % (ref 10–15)
GFR SERPL CREATININE-BSD FRML MDRD: >90 ML/MIN/{1.73_M2}
GLUCOSE SERPL-MCNC: 105 MG/DL (ref 70–99)
GLUCOSE UR STRIP-MCNC: NEGATIVE MG/DL
HCG UR QL: NEGATIVE
HCT VFR BLD AUTO: 19.5 % (ref 35–47)
HGB BLD-MCNC: 7 G/DL (ref 11.7–15.7)
HGB UR QL STRIP: NEGATIVE
KETONES UR STRIP-MCNC: NEGATIVE MG/DL
LEUKOCYTE ESTERASE UR QL STRIP: NEGATIVE
LYMPHOCYTES # BLD AUTO: 2.7 10E9/L (ref 0.8–5.3)
LYMPHOCYTES NFR BLD AUTO: 23.3 %
MACROCYTES BLD QL SMEAR: PRESENT
MCH RBC QN AUTO: 32.9 PG (ref 26.5–33)
MCHC RBC AUTO-ENTMCNC: 35.9 G/DL (ref 31.5–36.5)
MCV RBC AUTO: 92 FL (ref 78–100)
MONOCYTES # BLD AUTO: 0.2 10E9/L (ref 0–1.3)
MONOCYTES NFR BLD AUTO: 1.7 %
NEUTROPHILS # BLD AUTO: 8.3 10E9/L (ref 1.6–8.3)
NEUTROPHILS NFR BLD AUTO: 70 %
NITRATE UR QL: NEGATIVE
NRBC # BLD AUTO: 1.5 10*3/UL
NRBC BLD AUTO-RTO: 13 /100
PH UR STRIP: 6.5 PH (ref 5–7)
PLATELET # BLD AUTO: 255 10E9/L (ref 150–450)
PLATELET # BLD EST: ABNORMAL 10*3/UL
POIKILOCYTOSIS BLD QL SMEAR: ABNORMAL
POLYCHROMASIA BLD QL SMEAR: ABNORMAL
POTASSIUM SERPL-SCNC: 3.8 MMOL/L (ref 3.4–5.3)
PROT SERPL-MCNC: 7.4 G/DL (ref 6.8–8.8)
RBC # BLD AUTO: 2.13 10E12/L (ref 3.8–5.2)
RBC #/AREA URNS AUTO: <1 /HPF (ref 0–2)
RETICS # AUTO: 568.3 10E9/L (ref 25–95)
RETICS/RBC NFR AUTO: 26.7 % (ref 0.5–2)
SICKLE CELLS BLD QL SMEAR: ABNORMAL
SODIUM SERPL-SCNC: 140 MMOL/L (ref 133–144)
SOURCE: NORMAL
SP GR UR STRIP: 1.01 (ref 1–1.03)
SQUAMOUS #/AREA URNS AUTO: <1 /HPF (ref 0–1)
TARGETS BLD QL SMEAR: SLIGHT
UROBILINOGEN UR STRIP-MCNC: 0.2 MG/DL (ref 0–2)
WBC # BLD AUTO: 11.8 10E9/L (ref 4–11)
WBC #/AREA URNS AUTO: 1 /HPF (ref 0–5)

## 2020-08-01 PROCEDURE — 99285 EMERGENCY DEPT VISIT HI MDM: CPT | Mod: 25

## 2020-08-01 PROCEDURE — 81025 URINE PREGNANCY TEST: CPT | Performed by: EMERGENCY MEDICINE

## 2020-08-01 PROCEDURE — 80076 HEPATIC FUNCTION PANEL: CPT | Performed by: EMERGENCY MEDICINE

## 2020-08-01 PROCEDURE — 85025 COMPLETE CBC W/AUTO DIFF WBC: CPT | Performed by: EMERGENCY MEDICINE

## 2020-08-01 PROCEDURE — 81001 URINALYSIS AUTO W/SCOPE: CPT | Performed by: EMERGENCY MEDICINE

## 2020-08-01 PROCEDURE — 25000125 ZZHC RX 250: Performed by: EMERGENCY MEDICINE

## 2020-08-01 PROCEDURE — 96374 THER/PROPH/DIAG INJ IV PUSH: CPT

## 2020-08-01 PROCEDURE — 25800030 ZZH RX IP 258 OP 636: Performed by: EMERGENCY MEDICINE

## 2020-08-01 PROCEDURE — 96376 TX/PRO/DX INJ SAME DRUG ADON: CPT

## 2020-08-01 PROCEDURE — 85045 AUTOMATED RETICULOCYTE COUNT: CPT | Performed by: EMERGENCY MEDICINE

## 2020-08-01 PROCEDURE — 96375 TX/PRO/DX INJ NEW DRUG ADDON: CPT

## 2020-08-01 PROCEDURE — 25000128 H RX IP 250 OP 636: Performed by: EMERGENCY MEDICINE

## 2020-08-01 PROCEDURE — 80048 BASIC METABOLIC PNL TOTAL CA: CPT | Performed by: EMERGENCY MEDICINE

## 2020-08-01 RX ORDER — MORPHINE SULFATE 2 MG/ML
2 INJECTION, SOLUTION INTRAMUSCULAR; INTRAVENOUS ONCE
Status: COMPLETED | OUTPATIENT
Start: 2020-08-01 | End: 2020-08-01

## 2020-08-01 RX ORDER — ONDANSETRON 2 MG/ML
8 INJECTION INTRAMUSCULAR; INTRAVENOUS ONCE
Status: DISCONTINUED | OUTPATIENT
Start: 2020-08-01 | End: 2020-08-01

## 2020-08-01 RX ORDER — ONDANSETRON 4 MG/1
4 TABLET, ORALLY DISINTEGRATING ORAL ONCE
Status: COMPLETED | OUTPATIENT
Start: 2020-08-01 | End: 2020-08-01

## 2020-08-01 RX ORDER — KETOROLAC TROMETHAMINE 15 MG/ML
15 INJECTION, SOLUTION INTRAMUSCULAR; INTRAVENOUS ONCE
Status: COMPLETED | OUTPATIENT
Start: 2020-08-01 | End: 2020-08-01

## 2020-08-01 RX ADMIN — MORPHINE SULFATE 2 MG: 2 INJECTION, SOLUTION INTRAMUSCULAR; INTRAVENOUS at 01:38

## 2020-08-01 RX ADMIN — KETOROLAC TROMETHAMINE 15 MG: 15 INJECTION, SOLUTION INTRAMUSCULAR; INTRAVENOUS at 01:39

## 2020-08-01 RX ADMIN — ONDANSETRON 4 MG: 4 TABLET, ORALLY DISINTEGRATING ORAL at 05:41

## 2020-08-01 RX ADMIN — SODIUM CHLORIDE 1000 ML: 9 INJECTION, SOLUTION INTRAVENOUS at 01:39

## 2020-08-01 RX ADMIN — MORPHINE SULFATE 2 MG: 2 INJECTION, SOLUTION INTRAMUSCULAR; INTRAVENOUS at 03:58

## 2020-08-01 RX ADMIN — MORPHINE SULFATE 2 MG: 2 INJECTION, SOLUTION INTRAMUSCULAR; INTRAVENOUS at 02:33

## 2020-08-01 NOTE — DISCHARGE INSTRUCTIONS
Continue your home meds. Return to the ER with any new or worsening symptoms or any other concerns. Follow up with your hematology team next week.

## 2020-08-01 NOTE — ED PROVIDER NOTES
ED Provider Note  [facility] EMERGENCY DEPARTMENT ([facility])  August 1, 2020    History     Chief Complaint   Patient presents with     Sickle Cell Pain Crisis     HPI  Jennifer Cervantes is a 20 year old female with a past medical history significant for sickle cell disease, sickle cell pain crisis, cerebral infarction (2015), Ir cvc non tunnel placement (4/7/2020), hemiplegia and hemiparesis, blood clotting disorder, anemia, and cognitive developmental delay who presents to the Emergency Department for evaluation of diffuse low back pain which she states feels similar to previous sickle cell pain crises.  She reports she has been having pain for about a week, but much worse the last 2 days.  She has been using all of her home medications, states is just not controlled at this point.  There is no trauma.  States that the pain is worse with certain movements, and if she sits for a long time.  No fever, stuffy nose, sore throat, cough, shortness of breath, abdominal pain, nausea, vomiting, diarrhea, dysuria, hematuria.  No lower extremity pain or weakness.  Again, she does states she has had similar sickle cell pain crisis in the past, but is going on.  She states she did go to the infusion center on Friday, but tonight, the pain is just not controlled.      Past Medical History  Past Medical History:   Diagnosis Date     Anemia      Anxiety      Bleeding disorder (H)      Blood clotting disorder (H)      Cerebral infarction (H) 2015     Cognitive developmental delay     low IQ. 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 (H)     right hand contractures     Iron overload due to repeated red blood cell transfusions      Migraines      Oppositional defiant behavior      Uncomplicated asthma      Past Surgical History:   Procedure Laterality  Date     AS INSERT TUNNELED CV 2 CATH W/O PORT/PUMP       C BREAST REDUCTION (INCLUDES LIPO) TIER 3 Bilateral 04/23/2019     IR CVC NON TUNNEL PLACEMENT  4/7/2020     REPAIR TENDON ELBOW Right 10/2/2019    Procedure: Right Elbow Flexor Lengthening, Flexor Pronator Slide Of Wrist and Finger, Thumb Adductor Lengthening;  Surgeon: Anai Franco MD;  Location: [facility] OR     TONSILLECTOMY Bilateral 10/2/2019    Procedure: Bilateral Tonsillectomy;  Surgeon: Farhana Guy MD;  Location: [facility] OR     acetaminophen (TYLENOL) 325 MG tablet  albuterol (PROVENTIL) (2.5 MG/3ML) 0.083% neb solution  aspirin (ASPIRIN) 81 MG EC tablet  Budesonide-Formoterol Fumarate (SYMBICORT IN)  celecoxib (CELEBREX) 100 MG capsule  diphenhydrAMINE (BENADRYL) 25 MG capsule  EPINEPHrine (EPIPEN) 0.3 MG/0.3ML injection  erythromycin ethylsuccinate (E.E.S.) 400 MG tablet  gabapentin (NEURONTIN) 300 MG capsule  GNP SENNA-LAX 8.6 MG tablet  hydroxyurea (HYDREA) 500 MG capsule  ibuprofen (ADVIL/MOTRIN) 200 MG tablet  JADENU 360 MG tablet  medroxyPROGESTERone (DEPO-PROVERA) 150 MG/ML IM injection  medroxyPROGESTERone (PROVERA) 2.5 MG tablet  naloxone (NARCAN) 4 MG/0.1ML nasal spray  omeprazole (PRILOSEC) 40 MG DR capsule  ondansetron (ZOFRAN) 8 MG tablet  oxyCODONE IR (ROXICODONE) 10 MG tablet  OXYCONTIN 10 MG 12 hr tablet  sertraline (ZOLOFT) 100 MG tablet      Allergies   Allergen Reactions     Fish-Derived Products Hives     Seafood Hives     Contrast Dye      Diagnostic X-Ray Materials      Gadolinium      Past medical history, past surgical history, medications, and allergies were reviewed with the patient. Additional pertinent items: None    Family History  Family History   Problem Relation Age of Onset     Sickle Cell Trait Mother      Sickle Cell Trait Father      Family history was reviewed with the patient. Additional pertinent items: None    Social History  Social History     Tobacco Use     Smoking status: Never Smoker      Smokeless tobacco: Never Used   Substance Use Topics     Alcohol use: Not Currently     Alcohol/week: 0.0 standard drinks     Drug use: Never      Social history was reviewed with the patient. Additional pertinent items: None    Review of Systems  A complete review of systems was performed with pertinent positives and negatives noted in the HPI, and all other systems negative.    Physical Exam   BP: 129/78  Pulse: 99  Temp: 98.1  F (36.7  C)  SpO2: 94 %  Physical Exam  Constitutional:       General: She is not in acute distress.     Appearance: She is not diaphoretic.   HENT:      Head: Atraumatic.   Eyes:      General: Scleral icterus present.   Cardiovascular:      Heart sounds: Normal heart sounds.   Pulmonary:      Effort: No respiratory distress.      Breath sounds: Normal breath sounds.   Abdominal:      Palpations: Abdomen is soft.      Tenderness: There is no abdominal tenderness.   Musculoskeletal:         General: Tenderness present.        Back:    Skin:     General: Skin is warm.      Findings: No rash.         ED Course      Procedures                         Results for orders placed or performed during the hospital encounter of 07/31/20   CBC with platelets differential     Status: Abnormal   Result Value Ref Range    WBC 11.8 (H) 4.0 - 11.0 10e9/L    RBC Count 2.13 (L) 3.8 - 5.2 10e12/L    Hemoglobin 7.0 (L) 11.7 - 15.7 g/dL    Hematocrit 19.5 (L) 35.0 - 47.0 %    MCV 92 78 - 100 fl    MCH 32.9 26.5 - 33.0 pg    MCHC 35.9 31.5 - 36.5 g/dL    RDW 25.2 (H) 10.0 - 15.0 %    Platelet Count 255 150 - 450 10e9/L    Diff Method Manual Differential     % Neutrophils 70.0 %    % Lymphocytes 23.3 %    % Monocytes 1.7 %    % Eosinophils 4.2 %    % Basophils 0.8 %    Nucleated RBCs 13 (H) 0 /100    Absolute Neutrophil 8.3 1.6 - 8.3 10e9/L    Absolute Lymphocytes 2.7 0.8 - 5.3 10e9/L    Absolute Monocytes 0.2 0.0 - 1.3 10e9/L    Absolute Eosinophils 0.5 0.0 - 0.7 10e9/L    Absolute Basophils 0.1 0.0 - 0.2 10e9/L     Absolute Nucleated RBC 1.5     Anisocytosis Moderate     Poikilocytosis Marked     Polychromasia Moderate     Sickle Cells Marked     Target Cells Slight     Macrocytes Present     Platelet Estimate Confirming automated cell count    Basic metabolic panel     Status: Abnormal   Result Value Ref Range    Sodium 140 133 - 144 mmol/L    Potassium 3.8 3.4 - 5.3 mmol/L    Chloride 107 94 - 109 mmol/L    Carbon Dioxide 26 20 - 32 mmol/L    Anion Gap 6 3 - 14 mmol/L    Glucose 105 (H) 70 - 99 mg/dL    Urea Nitrogen 8 7 - 30 mg/dL    Creatinine 0.64 0.52 - 1.04 mg/dL    GFR Estimate >90 >60 mL/min/[1.73_m2]    GFR Estimate If Black >90 >60 mL/min/[1.73_m2]    Calcium 8.2 (L) 8.5 - 10.1 mg/dL   Reticulocyte count     Status: Abnormal   Result Value Ref Range    % Retic 26.7 (H) 0.5 - 2.0 %    Absolute Retic 568.3 (H) 25 - 95 10e9/L   Hepatic panel     Status: Abnormal   Result Value Ref Range    Bilirubin Direct 0.4 (H) 0.0 - 0.2 mg/dL    Bilirubin Total 4.4 (H) 0.2 - 1.3 mg/dL    Albumin 3.9 3.4 - 5.0 g/dL    Protein Total 7.4 6.8 - 8.8 g/dL    Alkaline Phosphatase 76 40 - 150 U/L    ALT 86 (H) 0 - 50 U/L     (H) 0 - 45 U/L   UA with Microscopic     Status: None   Result Value Ref Range    Color Urine Yellow     Appearance Urine Clear     Glucose Urine Negative NEG^Negative mg/dL    Bilirubin Urine Negative NEG^Negative    Ketones Urine Negative NEG^Negative mg/dL    Specific Gravity Urine 1.010 1.003 - 1.035    Blood Urine Negative NEG^Negative    pH Urine 6.5 5.0 - 7.0 pH    Protein Albumin Urine Negative NEG^Negative mg/dL    Urobilinogen mg/dL 0.2 0.0 - 2.0 mg/dL    Nitrite Urine Negative NEG^Negative    Leukocyte Esterase Urine Negative NEG^Negative    Source Clean catch urine     WBC Urine 1 0 - 5 /HPF    RBC Urine <1 0 - 2 /HPF    Squamous Epithelial /HPF Urine <1 0 - 1 /HPF   HCG qualitative urine     Status: None   Result Value Ref Range    HCG Qual Urine Negative NEG^Negative     Medications   sodium  chloride (PF) 0.9% PF flush 10-20 mL (has no administration in time range)   heparin lock flush 10 UNIT/ML injection 5-10 mL (5 mLs Intracatheter Not Given 8/1/20 0142)   heparin lock flush 10 UNIT/ML injection 5-10 mL (has no administration in time range)   heparin 100 UNIT/ML injection 5 mL (has no administration in time range)   sodium chloride (PF) 0.9% PF flush 10-20 mL (has no administration in time range)   ketorolac (TORADOL) injection 15 mg (15 mg Intravenous Given 8/1/20 0139)   0.9% sodium chloride BOLUS (0 mLs Intravenous Stopped 8/1/20 0333)   morphine (PF) injection 2 mg (2 mg Intravenous Given 8/1/20 0138)   morphine (PF) injection 2 mg (2 mg Intravenous Given 8/1/20 0233)   morphine (PF) injection 2 mg (2 mg Intravenous Given 8/1/20 0358)   ondansetron (ZOFRAN-ODT) ODT tab 4 mg (4 mg Oral Given 8/1/20 0541)        Assessments & Plan (with Medical Decision Making)   Patient states that her symptoms are consistent with previous episodes of sickle cell pain crisis.  No urinary symptoms, urinalysis was not remarkable.  Labs revealed a mildly elevated white count, consistent with previous values.  Hemoglobin is tender on low, slowly dropping over time.  Hemoglobin was 7, which again is a little bit lower than most recent, though she has been trending down over last multiple checks.  No sign of bleeding.  I did discuss with the patient did recommend she follow-up with her hematology team this week for recheck.  Per chart review they are wanting to be judicious with transfusions.  She was given her protocol which includes Toradol, morphine 2 mg x 3, IV fluids.  At this point she states she feels significantly better and feels she can discharge home.  There is been no trauma, I do not think x-rays are warranted.  No clear evidence suggestive of infection.  She is encouraged to return to the ER with any new or worsening symptoms, and follow-up as discussed.  She verbalizes understanding and is agreeable to  the plan.    Dictation Disclaimer: Some of this Note has been completed with voice-recognition dictation software. Although errors are generally corrected real-time, there is the potential for a rare error to be present in the completed chart.      I have reviewed the nursing notes. I have reviewed the findings, diagnosis, plan and need for follow up with the patient.    Discharge Medication List as of 8/1/2020  5:11 AM          Final diagnoses:   Sickle cell pain crisis (H)   Anemia, unspecified type         Emergency Medicine   [facility], [place], EMERGENCY DEPARTMENT  7/31/2020     Court Ring MD  08/01/20 0637

## 2020-08-01 NOTE — ED TRIAGE NOTES
"Pt arrives with complaints of sickle cell crisis for \"two days at the most\". She was unable to get into the infusion center today- so the nurse advice line told her to come to the ED for pain control. For pain at home she takes oxycontin, oxycodone, and tylenol/ibuprofen. Her pain is mainly located in her back. Describes it as nonradiating low back pain  "

## 2020-08-03 ENCOUNTER — TELEPHONE (OUTPATIENT)
Dept: ONCOLOGY | Facility: CLINIC | Age: 20
End: 2020-08-03

## 2020-08-03 ENCOUNTER — PATIENT OUTREACH (OUTPATIENT)
Dept: ONCOLOGY | Facility: CLINIC | Age: 20
End: 2020-08-03

## 2020-08-03 NOTE — PROGRESS NOTES
Writer received message from Amanda Roth RN, in Triage regarding Jennifer's increased pain. Per Amanda, there was a male voice prompting her in the background and clear picture of her current pain status could not be ascertained. Writer called Jennifer who stated that she has been taking her oxycodone IR every 8 ours and that while the ER visit for pain was helpful in the short-term, her relief did not last. She has been adding in warm showers and heat pads for her back pain but is unable to find adequate relief. Writer reviewed that if her pain is increasing, we want her to call us sooner as it will be more difficult to manage her pain if it gets too far out of control. Jennifer voiced understanding and stated that she has been trying very hard to stay out of the ER and hospital since coming to the adult side from pediatrics. Writer acknowledged effort and explained that it is easier to manage pain the sooner we intervene and if in-patient is needed, will be able to shorten length of stay the sooner we act. Patient voided understanding and will try every 6 hour oxycodone IR and if not successful, report to ER if after hours or call us back if during business hours. Plan reviewed with Dr Duncan who is in agreement.

## 2020-08-03 NOTE — TELEPHONE ENCOUNTER
"Pt called back in to triage stating pain is still in lower back rated 8/10, writer can hear man's voice in background coaching pt, saying pain level clearly before pt answers. Asked pt if ED visit on 7/31 was effective, she stated \"not really\" but did not want to return to ED over the weekend and waited to call back in for infusion today. Stated taking oxy IR 10 mg every 6-8 hours, last doses 9 pm and 5 am this morning, oxycontin at 8 am along with scheduled tylenol, celebrex and gabapentin. State pain level did not decrease from 8/10. Denied any new swelling, fevers, chills, cough, sob or other symptoms. Pt has follow-up visit with Dr. Duncan on Friday 8/7. Discussed with RNCC who will follow-up.  " EMERGENCY DEPARTMENT HISTORY AND PHYSICAL EXAM 
 
4:10 PM 
 
 
Date: 1/18/2019 Patient Name: Elvi Lara History of Presenting Illness Chief Complaint Patient presents with  Syncope History Provided By: Patient Chief Complaint: syncope Duration: 1 Hours Timing:  Acute Location: general 
Quality: Dull Severity: Mild Modifying Factors: standing up after sitting for a long time Associated Symptoms: dizziness, congestion Additional History (Context): Elvi Lara is a 24 y.o. female with No significant past medical history who presents with c/o acute syncopal episode approx 1 hour PTA. Pt confirms associated sx of dizziness after syncope which is resolved upon presentation and denies any N/V, HA, abd pain, CP, SOB, or urinary sx. She states just before syncopal episode she became very hot, got up to use the restroom and fainted. She hit her head on the floor and came to directly after that. Her friends were with her and informed her that her face went pale and her lips were blue during incident. Pt felt dizzy after that so called mother to bring her to the ED. She has not taken any medications for congestion and denies any tobacco or illicit drug use. She drinks etOH socially. Her LNMP was approx 1/4/19. No other concerns or symptoms at this time. PCP: Kan Parker, Noman Leigh MD 
 
Current Facility-Administered Medications Medication Dose Route Frequency Provider Last Rate Last Dose  sodium chloride 0.9 % bolus infusion 1,000 mL  1,000 mL IntraVENous Lonnie Phillips PA-C Current Outpatient Medications Medication Sig Dispense Refill  norethindrone-ethinyl estradiol-iron (JUNEL FE 1.5/30, 28,) 1.5 mg-30 mcg (21)/75 mg (7) tab Take 1 Tab by mouth daily.  loratadine (CLARITIN) 10 mg tablet Take 1 Tab by mouth daily. Indications:  Allergic Rhinitis 30 Tab 0  
 norgestimate-ethinyl estradiol (ORTHO-CYCLEN) 0.25-35 mg-mcg tab Take 1 Tab by mouth daily. Past History Past Medical History: 
History reviewed. No pertinent past medical history. Past Surgical History: 
History reviewed. No pertinent surgical history. Family History: 
Family History Problem Relation Age of Onset  Hypertension Mother  Stroke Neg Hx  Cancer Neg Hx  Diabetes Neg Hx   
 Heart Disease Neg Hx Social History: 
Social History Tobacco Use  Smoking status: Never Smoker  Smokeless tobacco: Never Used Substance Use Topics  Alcohol use: Yes Comment: socially/rarely  Drug use: No  
 
 
Allergies: 
No Known Allergies Review of Systems Review of Systems Constitutional: Negative for fever. HENT: Positive for congestion. Negative for facial swelling. Eyes: Negative for visual disturbance. Respiratory: Negative for shortness of breath. Cardiovascular: Negative for chest pain. Gastrointestinal: Negative for abdominal pain, nausea and vomiting. Genitourinary: Negative for dysuria and hematuria. Musculoskeletal: Negative for neck pain. Skin: Negative for rash. Neurological: Positive for dizziness and syncope. Negative for headaches. Psychiatric/Behavioral: Negative for confusion. All other systems reviewed and are negative. Physical Exam  
 
Visit Vitals /66 Pulse 83 Temp 97.9 °F (36.6 °C) Resp 16 SpO2 100% Physical Exam  
Constitutional: She is oriented to person, place, and time. She appears well-developed and well-nourished. No distress. HENT:  
Head: Normocephalic and atraumatic. Eyes: Conjunctivae are normal.  
Neck: Normal range of motion. Cardiovascular: Normal rate and regular rhythm. Pulmonary/Chest: Effort normal.  
Abdominal: She exhibits no distension. Musculoskeletal: Normal range of motion. Neurological: She is alert and oriented to person, place, and time. Skin: Skin is warm and dry. She is not diaphoretic. Psychiatric: She has a normal mood and affect. Nursing note and vitals reviewed. Diagnostic Study Results Labs - Recent Results (from the past 12 hour(s)) EKG, 12 LEAD, INITIAL Collection Time: 01/18/19  4:05 PM  
Result Value Ref Range Ventricular Rate 79 BPM  
 Atrial Rate 79 BPM  
 P-R Interval 128 ms QRS Duration 82 ms Q-T Interval 380 ms QTC Calculation (Bezet) 435 ms Calculated P Axis 26 degrees Calculated R Axis 67 degrees Calculated T Axis 45 degrees Diagnosis Normal sinus rhythm with sinus arrhythmia Normal ECG When compared with ECG of 25-JUL-2016 12:16, No significant change was found Confirmed by Jerry Prado MD, Renata Moreno ([id number]) on 1/18/2019 4:41:43 PM 
  
CBC WITH AUTOMATED DIFF Collection Time: 01/18/19  4:15 PM  
Result Value Ref Range WBC 12.5 4.6 - 13.2 K/uL  
 RBC 4.22 4.20 - 5.30 M/uL  
 HGB 12.8 12.0 - 16.0 g/dL HCT 38.2 35.0 - 45.0 % MCV 90.5 74.0 - 97.0 FL  
 MCH 30.3 24.0 - 34.0 PG  
 MCHC 33.5 31.0 - 37.0 g/dL  
 RDW 12.4 11.6 - 14.5 % PLATELET 113 287 - 607 K/uL MPV 9.1 (L) 9.2 - 11.8 FL  
 NEUTROPHILS 88 (H) 40 - 73 % LYMPHOCYTES 5 (L) 21 - 52 % MONOCYTES 6 3 - 10 % EOSINOPHILS 1 0 - 5 % BASOPHILS 0 0 - 2 %  
 ABS. NEUTROPHILS 10.9 (H) 1.8 - 8.0 K/UL  
 ABS. LYMPHOCYTES 0.7 (L) 0.9 - 3.6 K/UL  
 ABS. MONOCYTES 0.7 0.05 - 1.2 K/UL  
 ABS. EOSINOPHILS 0.2 0.0 - 0.4 K/UL  
 ABS. BASOPHILS 0.0 0.0 - 0.1 K/UL  
 DF AUTOMATED METABOLIC PANEL, BASIC Collection Time: 01/18/19  4:15 PM  
Result Value Ref Range Sodium 139 136 - 145 mmol/L Potassium 3.6 3.5 - 5.5 mmol/L Chloride 101 100 - 108 mmol/L  
 CO2 27 21 - 32 mmol/L Anion gap 11 3.0 - 18 mmol/L Glucose 97 74 - 99 mg/dL BUN 11 7.0 - 18 MG/DL Creatinine 0.70 0.6 - 1.3 MG/DL  
 BUN/Creatinine ratio 16 12 - 20 GFR est AA >60 >60 ml/min/1.73m2 GFR est non-AA >60 >60 ml/min/1.73m2  Calcium 9.0 8.5 - 10.1 MG/DL  
 URINALYSIS W/ RFLX MICROSCOPIC Collection Time: 01/18/19  4:45 PM  
Result Value Ref Range Color YELLOW Appearance CLEAR Specific gravity 1.023 1.005 - 1.030    
 pH (UA) 7.0 5.0 - 8.0 Protein TRACE (A) NEG mg/dL Glucose NEGATIVE  NEG mg/dL Ketone 40 (A) NEG mg/dL Bilirubin NEGATIVE  NEG Blood NEGATIVE  NEG Urobilinogen 1.0 0.2 - 1.0 EU/dL Nitrites NEGATIVE  NEG Leukocyte Esterase TRACE (A) NEG    
HCG URINE, QL Collection Time: 01/18/19  4:45 PM  
Result Value Ref Range HCG urine, QL NEGATIVE  NEG    
URINE MICROSCOPIC ONLY Collection Time: 01/18/19  4:45 PM  
Result Value Ref Range WBC 0 to 3 0 - 4 /hpf  
 RBC NONE 0 - 5 /hpf Epithelial cells 1+ 0 - 5 /lpf Bacteria NEGATIVE  NEG /hpf Radiologic Studies - No orders to display Medical Decision Making It should be noted that ILonnie PA-C 
 will be the provider of record for this patient. I reviewed the vital signs, available nursing notes, past medical history, past surgical history, family history and social history. Vital Signs-Reviewed the patient's vital signs. Pulse Oximetry Analysis -  100%  on room air (Interpretation) Cardiac Monitor: 
Rate: 83 Rhythm:  Normal Sinus Rhythm EKG: Interpreted by the EP. Time Interpreted: 1522 Rate: 79 Rhythm: Normal Sinus Rhythm Interpretation: 
 Comparison:   
 
Records Reviewed: Nursing Notes (Time of Review: 4:10 PM) ED Course: Progress Notes, Reevaluation, and Consults: 
 
No abnormalities on blood work, urine or EKG. Likely vasovagal.   Discussed treatment plan, return precautions, symptomatic relief, and expected time to improvement. All questions answered. Patient is stable for discharge and outpatient management. Diagnosis Clinical Impression: 1. Syncope and collapse Disposition: home Follow-up Information Follow up With Specialties Details Why Contact Info Georgia Davis MD Internal Medicine  If symptoms do not improve [phone] [facility] 
[phone] [id number] [facility] EMERGENCY DEPT Emergency Medicine  Immediately if symptoms worsen Estefania Purdy [id number] [phone] Medication List  
  
ASK your doctor about these medications JUNEL FE 1.5/30 (28) 1.5 mg-30 mcg (21)/75 mg (7) Tab Generic drug:  norethindrone-ethinyl estradiol-iron 
  
loratadine 10 mg tablet Commonly known as:  Sharilymirta Feil Take 1 Tab by mouth daily. Indications: Allergic Rhinitis 
  
norgestimate-ethinyl estradiol 0.25-35 mg-mcg Tab Commonly known as:  [place] 
  
  
 
_______________________________ Scribe Attestation Liz Garland acting as a scribe for and in the presence of Carmen Hall 
     
January 18, 2019 at 4:10 PM 
    
Provider Attestation:     
I personally performed the services described in the documentation, reviewed the documentation, as recorded by the scribe in my presence, and it accurately and completely records my words and actions. January 18, 2019 at 4:10 PM - Carmen Hall 
 
 
_______________________________

## 2020-08-03 NOTE — TELEPHONE ENCOUNTER
"Per Pt's RNCC \"Jennifer Genao was in the ER on Friday night fo rher pain. Can someone call her early Monday morning to see how her pain is? If she is stating it's bad, please carefully probe about the pain, where it is, how manageable is it, etc? I don't necessarily want to suggest that she come in for IVF/pain meds but maybe let's see if her description warrants her coming in. I'd like to see her try oxy every 6 hours (per Dr PIERCE) for a few rounds prior to bringing her in. Maybe let's touch base and if needed, we can decrease the time between oxy doses to q6hr and bring her in on Tuesday with labs prior. Also depends on if she goes to ER over the remainder of this weekend.     Thanks-     Julieta\"    Called Pt to assess. No answer, Left message on machine to call back.  "

## 2020-08-07 ENCOUNTER — PATIENT OUTREACH (OUTPATIENT)
Dept: ONCOLOGY | Facility: CLINIC | Age: 20
End: 2020-08-07

## 2020-08-07 NOTE — PROGRESS NOTES
Attempted to reach Jennifer regarding her missed appointment today.  Left voicemail. Will need to be rescheduled ,

## 2020-08-17 ENCOUNTER — VIRTUAL VISIT (OUTPATIENT)
Dept: ONCOLOGY | Facility: CLINIC | Age: 20
End: 2020-08-17
Attending: PEDIATRICS
Payer: COMMERCIAL

## 2020-08-17 DIAGNOSIS — D57.1 HB-SS DISEASE WITHOUT CRISIS (H): ICD-10-CM

## 2020-08-17 DIAGNOSIS — D57.00 SICKLE CELL CRISIS (H): ICD-10-CM

## 2020-08-17 DIAGNOSIS — F41.9 ANXIETY: ICD-10-CM

## 2020-08-17 RX ORDER — OXYCODONE HYDROCHLORIDE 10 MG/1
TABLET ORAL
Qty: 60 TABLET | Refills: 0 | Status: SHIPPED | OUTPATIENT
Start: 2020-08-17 | End: 2020-09-01

## 2020-08-17 RX ORDER — SERTRALINE HYDROCHLORIDE 100 MG/1
200 TABLET, FILM COATED ORAL DAILY
Qty: 60 TABLET | Refills: 3 | Status: SHIPPED | OUTPATIENT
Start: 2020-08-17 | End: 2020-11-02

## 2020-08-17 RX ORDER — OXYCODONE HYDROCHLORIDE 10 MG/1
10 TABLET, FILM COATED, EXTENDED RELEASE ORAL EVERY 12 HOURS
Qty: 60 TABLET | Refills: 0 | Status: SHIPPED | OUTPATIENT
Start: 2020-08-17 | End: 2020-10-01

## 2020-08-17 NOTE — PROGRESS NOTES
"Jennifer Cervantes is a 20 year old female who is being evaluated via a billable telephone visit.      The patient has been notified of following:     \"This telephone visit will be conducted via a call between you and your physician/provider. We have found that certain health care needs can be provided without the need for a physical exam.  This service lets us provide the care you need with a short phone conversation.  If a prescription is necessary we can send it directly to your pharmacy.  If lab work is needed we can place an order for that and you can then stop by our lab to have the test done at a later time.    Telephone visits are billed at different rates depending on your insurance coverage. During this emergency period, for some insurers they may be billed the same as an in-person visit.  Please reach out to your insurance provider with any questions.    If during the course of the call the physician/provider feels a telephone visit is not appropriate, you will not be charged for this service.\"    Patient has given verbal consent for Telephone visit?  Yes    What phone number would you like to be contacted at? [phone]    How would you like to obtain your AVS? Mail a copy     Patient states, \" I can't get into my phone right now, I can receive phone calls, but can't get into my phone to access [employer]. \" Wanted changed to telephone visit.    Vitals - Patient Reported  Weight (Patient Reported): 70.3 kg (155 lb)  Height (Patient Reported): 162.6 cm (5' 4.02\")  BMI (Based on Pt Reported Ht/Wt): 26.59  Pain Score: Severe Pain (7)  Pain Loc: Low Back(legs)    Shahriar Sheldon LPN    Phone call duration: 36 minutes  Start time: 1:30 PM  End Time: 2:06 PM  Eric Duncan MD  ----------------------------------------------------    [facility] Cell [facility] Follow Up Outpatient Visit    Date of encounter: 8/17/2020    Visit Notes    Jennifer Cervantes is a 20 year old female who is has a follow up outpatient " "hematology visit for Hb SS.    Jennifer's visit was done on the phone    Interval History  Jennifer is doing much better today than in past weeks. Two weeks ago she had significant pain and came to the ED (7/31) but declined to be admitted. She has been managing her pain with hot baths, heat packs, 600 mg ibuprofen, 650 mg acetaminophen, and 10 mg q8h PRN oxycodone, as she said she really tries to stick with the Rx on the bottle. She does not think that her oxycodone lasts a full 8 h. She says she has 4-5 left but probably would need a refill this week, as it had been a month. She said her energy is better than 2 weeks ago when Hgb was 7 g/dL. She is wanting to have a transfusion and is fine with a simple transfusion. The numbness in her face from her indwelling line has resolved. She wanted to update her medication list and reinforced that she is trying really hard to keep up with her home plan including Jadenu and hydroxyurea. We did not discuss the past BMT appointment.      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 She also has significant anxiety and depression with a strong anxiety about transferring to the adult clinic. She usually has pain crises secondary to the anxiety. This \"fear of the unknown\" is significant enough that she wants to tour the inpatient floor before the transition or before she gets admitted there. [facility] with recurrent pain crises and is actually out on pass now but is still admitted as of Friday 2/28. She has no real support from family at home and that, combined with her cognitive delays, make her care even more " complex. She is having some back pain today which is a frequent location for her. She does say that her oral options do take an edge off. No fevers or cough, chest pain, dyspnea, bruising, or GI symptoms today. She has gotten a couple doses of Desferal for iron overload as she has been on chronic transfusions post-stroke for a long time. She is up to date on immunizations and got to meet with Daya Carter last week (also while on pass). It is unclear when she will discharge from [facility] but based on recent history, she thinks she will have a pain crisis soon after discharge.    Key results prior to referral:  MR ABDOMEN W/O CONTRAST ([facility]), 10/3/2017  (unlikely to be most recent one)     Comparison: 12/28/2015   Clinical history: Sickle cell disease   Findings:     Average liver iron concentration:  28.6 mg/g dry tissue, previously 22.8 mg/g dry tissue (NR: 0.17-1.8)  513 mmol/kg dry tissue, previously 400.8 mmol/kg dry tissue (NR: 3-33)     There is also iron deposition in the spleen, which is enlarged.  Prominent vessels in the left upper quadrant, which may be related to varices, are unchanged from the comparison examinations.                                                            Impression:  1. Increase in elevated liver concentration.  2. Splenomegaly. Question portal hypertension.     WAYNE CURTIS MD    11/4/19  MRI Pelvis  Summary:  Marrow changes consistent with SCD but no convincing evidence of AVN at this time.     1/28/20 MRI/MRA  Comparison 5/8/15  Summary:  Stable appearance of the brain and cerebral vasculature compared to 5/8/2015, including remote left MCA teritory infarct. No evidence of new infarct or new abnormality on MRA    3/10/2020 Cardiac MRI      Review of systems:  A complete 14 point review of systems was completed. All were negative except for what was reported in the HPI or highlighted here.    --------------------------------  Plan last reviewed with patient:  8/17/2020    Patient background: 21yo F, enjoys movies and kids though there are times where she does not really want to talk to people. Does not have a lot of social support at home.     Sickle Cell Disease History  Primary Hematologist: Perla  PCP: Raul  Genotype: SS  Acute Pain Crisis Treatment:    ER/Acute Care/Infusion Clinic:   o Morphine 2 mg IVP/SC Q1H X 3 doses  o Toradol  o Maintenance IV fluids with D5 half-normal saline  o Other: Benadryl PO and Zofran 8 mg IV PRN itching or nausea    Inpatient:  o Opioid: Morphine 2 mg IV Q1H PRN until PCA starts  o PCA plan:   - PCA button dose: Morphine 1 mg  - Lockout: 10 minutes  - Continuous Infusion: consider 1 mg/hr  - One hr max: 6 mg  o Other Medications: Benadryl, Zofran  o If PCA not working, she Has had success with ketamine starting at 4mg/h and advancing only to 6mg/h, as 8mg/h made her feel quite poorly.  o If giving scheduled toradol, hold ASA (ok to give celebrex if she prefers)  o Supportive Care: Docusate, Senna  Chronic Pain Medications:    Home regimen  OxyCONTIN 10 mg po q 12 hrs and oxycodone 10 mg p.o. q.6 hours p.r.n. breakthrough pain.  She also continues on Celebrex, and Zoloft among other medications.    -Also benefits from mental health visits, acupuncture  Baseline Hemoglobin: 9.5 g/dL (on chronic transfusions), 7-8 without  Hydroxyurea use: Yes  H/O blood transfusions: Yes, several (iron overload) Most recent 5/22/20    H/O Transfusion Reactions: no    Antibodies:none  H/O Acute Chest Syndrome: Yes    Last episode: 10/2019     ICU/intubation: unsure  H/O Stroke: Yes (managed with chronic transfusions in the past)  H/O VTE: no  H/O Cholecystectomy or Splenectomy: no  H/O Asthma, Pulm HTN, AVN, Leg Ulcers, Nephropathy, Retinopathy, etc: Iron overload, asthma, chronic lung disease, developmental delay from early stroke    EMERGENCY CARE PLAN  DO NOT TRANSFUSE WITHOUT DISCUSSING WITH HEMATOLOGY ATTENDING (available 24/7).        TRANSFUSION IS RISKY DUE TO RISK OF ALLOIMMUNIZATION AGAINST RBC ANTIGENS AND IRON OVERLOAD.  INDICATIONS FOR TRANSFUSION ARE ACUTE STROKE AND ACUTE CHEST SYNDROME (hypoxia plus infiltrate, not chest pain).  DO NOT TRANSFUSE FOR ANEMIA      -------------------------------------------    Sickle Cell Disease Comprehensive Checklist    Bone Health/Avascular Necrosis Screening/Symptoms (each visit): none today    Leg Ulcer evaluation (every visit): none    Hypertension (every visit): none    Last ophthalmologic exam: within last year (timing unsure)    Last urinalysis for microalbuminuria/CKD (annually): within last year    Last pulmonary evaluation (asthma, AMAN, pulm HTN): within last year    Stroke/silent cerebral infarct Hx (Y/N): Yes    Last PCP Visit: 8/2020    Vaccines:  o PCV13: 5/13/19  o Pneumovax (PPSV23): 3/04, 10/09, 7/12/19 (next due 7/2024)  o Menactra: 4/2010, 9/2015 (MCV due Sept 2020)  o Trumemba:  o Influenza: got 19-20 vaccine    Audiology (chelation): done 6/2020, normal    Past Medical History:  Past Medical History:   Diagnosis Date     Anemia      Anxiety      Bleeding disorder (H)      Blood clotting disorder (H)      Cerebral infarction (H) 2015     Cognitive developmental delay     low IQ. 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 (H)     right hand contractures     Iron overload due to repeated red blood cell transfusions      Migraines      Oppositional defiant behavior      Uncomplicated asthma        Past Surgical History:  Past Surgical History:   Procedure Laterality Date     AS INSERT TUNNELED CV 2 CATH W/O PORT/PUMP       C BREAST REDUCTION (INCLUDES LIPO) TIER 3 Bilateral 04/23/2019     IR CVC NON TUNNEL PLACEMENT  4/7/2020     REPAIR TENDON ELBOW Right 10/2/2019    Procedure: Right Elbow Flexor Lengthening, Flexor Pronator Slide Of Wrist and Finger,  "Thumb Adductor Lengthening;  Surgeon: Anai Franco MD;  Location: [facility] OR     TONSILLECTOMY Bilateral 10/2/2019    Procedure: Bilateral Tonsillectomy;  Surgeon: Farhana Guy MD;  Location: [facility] OR       Family History:   Family History   Problem Relation Age of Onset     Sickle Cell Trait Mother      Sickle Cell Trait Father        Social History:  Social History     Socioeconomic History     Marital status: Single     Spouse name: Not on file     Number of children: Not on file     Years of education: Not on file     Highest education level: Not on file   Occupational History     Not on file   Social Needs     Financial resource strain: Not on file     Food insecurity     Worry: Not on file     Inability: Not on file     Transportation needs     Medical: Not on file     Non-medical: Not on file   Tobacco Use     Smoking status: Never Smoker     Smokeless tobacco: Never Used   Substance and Sexual Activity     Alcohol use: Not Currently     Alcohol/week: 0.0 standard drinks     Drug use: Never     Sexual activity: Not Currently     Partners: Male     Birth control/protection: Other   Lifestyle     Physical activity     Days per week: Not on file     Minutes per session: Not on file     Stress: Not on file   Relationships     Social connections     Talks on phone: Not on file     Gets together: Not on file     Attends Je[employer]ah's witness service: Not on file     Active member of club or organization: Not on file     Attends meetings of clubs or organizations: Not on file     Relationship status: Not on file     Intimate partner violence     Fear of current or ex partner: Not on file     Emotionally abused: Not on file     Physically abused: Not on file     Forced sexual activity: Not on file   Other Topics Concern     Parent/sibling w/ CABG, MI or angioplasty before 65F 55M? Not Asked   Social History Narrative    Lives with mom and extended family but \"toxic environment\" per her report. She would like to " move out but cannot financially do so. She has minimal support at home despite her significant SCD comorbidities and cognitive delay from stroke.       Medications:  Current Outpatient Medications   Medication     acetaminophen (TYLENOL) 325 MG tablet     albuterol (PROVENTIL) (2.5 MG/3ML) 0.083% neb solution     aspirin (ASPIRIN) 81 MG EC tablet     Budesonide-Formoterol Fumarate (SYMBICORT IN)     celecoxib (CELEBREX) 100 MG capsule     diphenhydrAMINE (BENADRYL) 25 MG capsule     GNP SENNA-LAX 8.6 MG tablet     Hydroxyurea 1000 MG TABS     ibuprofen (ADVIL/MOTRIN) 200 MG tablet     JADENU 360 MG tablet     medroxyPROGESTERone (DEPO-PROVERA) 150 MG/ML IM injection     ondansetron (ZOFRAN) 8 MG tablet     oxyCODONE IR (ROXICODONE) 10 MG tablet     OXYCONTIN 10 MG 12 hr tablet     sertraline (ZOLOFT) 100 MG tablet     naloxone (NARCAN) 4 MG/0.1ML nasal spray     No current facility-administered medications for this visit.          Physical Exam:   No exam (phone visit)    No recent labs    Imaging: none today    Assessment:  Jennifer Cervantes is a 20 year old female with HbSS. Her disease has been complicated by stroke leading to significant cognitive delay and right UE hemiparesis, high anxiety leading to frequent pain crises on top of chronic pain syndrome, poor social structure at home with no real support, and iron overload secondary to repeated transfusions. She is doing better today than a few weeks ago and is optimistic about continued improvements.     Plan:  1) HbSS and complications   -- Pain plan in chart. Not reviewed in this visit. reviewed and updated.    Heat packs, warm showers, Tylenol, ibuprofen. Refilled oxycodone and oxycontin and changed oxycodone to q6h PRN with instructions to call clinic if needing multiple doses              --She had been on q6 week transfusions (exchanged 4/7) Last transfusion early May. Had CVA ~1 yo and another TIA ~2014 during a trial off transfusions and just HU. My goal  is to ultimately stop the transfusions given her significant iron overload but she has taken some time to become comfortable with this. She is ~3 months out from the last transfusion and is feeling like she needs another one so she can get one this week..   --Needs aggressive chelation. Jadenu now 1440 mg daily and well-tolerated per report but denies current use, consistent with rising ferritin. Audiology testing reassuring.   --Continue ASA post-stroke  2) Labs: CBC, CMP, ferritin  3) Medication Changes: Oxycodone back to q6h PRN. Hydroxyurea changed to 1000 mg tabs. Will consider an increase to 3000 mg soon.  5)  HCM: PCP is Daya Carter. Need to get SW closely involved.   6)  Psych: 200 mg Zoloft   7)  Facial numbness/tingling: resolved  8) Next visit: 4 weeks in person    It was a pleasure talking to Jennifer today and in continuing to guide her care and transition to adult care moving forward.    I spent a total of 36 minutes on the phone with Jennifer during today's office visit. See note for details.        Eric Duncan MD  Hematologist  Division of Hematology, Oncology, and Transplantation  [facility]  [facility]  Pager: [phone]

## 2020-08-17 NOTE — LETTER
"8/17/2020     RE: Jennifer Cervantes  [address]  [facility] [address]    Dear Colleague,    Thank you for referring your patient, Jennifer Cervantes, to the [facility]. Please see a copy of my visit note below.    Jennifer Cervantes is a 20 year old female who is being evaluated via a billable telephone visit.      Patient states, \" I can't get into my phone right now, I can receive phone calls, but can't get into my phone to access [employer]. \" Wanted changed to telephone visit.    Vitals - Patient Reported  Weight (Patient Reported): 70.3 kg (155 lb)  Height (Patient Reported): 162.6 cm (5' 4.02\")  BMI (Based on Pt Reported Ht/Wt): 26.59  Pain Score: Severe Pain (7)  Pain Loc: Low Back(legs)    Shahriar Sheldon LPN    Phone call duration: 36 minutes  Start time: 1:30 PM  End Time: 2:06 PM  Eric Duncan MD  ----------------------------------------------------    Sickle Cell Clinic Follow Up Outpatient Visit    Date of encounter: 8/17/2020    Visit Notes    Jennifer Cervantes is a 20 year old female who is has a follow up outpatient hematology visit for Hb SS.    Jennifer's visit was done on the phone    Interval History  Jennifer is doing much better today than in past weeks. Two weeks ago she had significant pain and came to the ED (7/31) but declined to be admitted. She has been managing her pain with hot baths, heat packs, 600 mg ibuprofen, 650 mg acetaminophen, and 10 mg q8h PRN oxycodone, as she said she really tries to stick with the Rx on the bottle. She does not think that her oxycodone lasts a full 8 h. She says she has 4-5 left but probably would need a refill this week, as it had been a month. She said her energy is better than 2 weeks ago when Hgb was 7 g/dL. She is wanting to have a transfusion and is fine with a simple transfusion. The numbness in her face from her indwelling line has resolved. She wanted to update her medication list and reinforced that she is trying really hard to keep " "up with her home plan including Jadenu and hydroxyurea. We did not discuss the past BMT appointment.      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 She also has significant anxiety and depression with a strong anxiety about transferring to the adult clinic. She usually has pain crises secondary to the anxiety. This \"fear of the unknown\" is significant enough that she wants to tour the inpatient floor before the transition or before she gets admitted there. [facility] with recurrent pain crises and is actually out on pass now but is still admitted as of Friday 2/28. She has no real support from family at home and that, combined with her cognitive delays, make her care even more complex. She is having some back pain today which is a frequent location for her. She does say that her oral options do take an edge off. No fevers or cough, chest pain, dyspnea, bruising, or GI symptoms today. She has gotten a couple doses of Desferal for iron overload as she has been on chronic transfusions post-stroke for a long time. She is up to date on immunizations and got to meet with Daya Carter last week (also while on pass). It is unclear when she will discharge from [facility] but based on recent history, she thinks she will have a pain crisis soon after discharge.    Key results prior to referral:  MR ABDOMEN W/O CONTRAST ([facility]), 10/3/2017  (unlikely to be most recent one)     Comparison: 12/28/2015   Clinical history: Sickle cell disease   Findings:     Average liver iron concentration:  28.6 mg/g dry tissue, previously 22.8 mg/g dry tissue (NR: " 0.17-1.8)  513 mmol/kg dry tissue, previously 400.8 mmol/kg dry tissue (NR: 3-33)     There is also iron deposition in the spleen, which is enlarged.  Prominent vessels in the left upper quadrant, which may be related to varices, are unchanged from the comparison examinations.                                                            Impression:  1. Increase in elevated liver concentration.  2. Splenomegaly. Question portal hypertension.     WAYNE CURTIS MD    11/4/19  MRI Pelvis  Summary:  Marrow changes consistent with SCD but no convincing evidence of AVN at this time.     1/28/20 MRI/MRA  Comparison 5/8/15  Summary:  Stable appearance of the brain and cerebral vasculature compared to 5/8/2015, including remote left MCA teritory infarct. No evidence of new infarct or new abnormality on MRA    3/10/2020 Cardiac MRI      Review of systems:  A complete 14 point review of systems was completed. All were negative except for what was reported in the HPI or highlighted here.    --------------------------------  Plan last reviewed with patient: 8/17/2020    Patient background: 21yo F, enjoys movies and kids though there are times where she does not really want to talk to people. Does not have a lot of social support at home.     Sickle Cell Disease History  Primary Hematologist: Perla  PCP: Raul  Genotype: SS  Acute Pain Crisis Treatment:    ER/Acute Care/Infusion Clinic:   o Morphine 2 mg IVP/SC Q1H X 3 doses  o Toradol  o Maintenance IV fluids with D5 half-normal saline  o Other: Benadryl PO and Zofran 8 mg IV PRN itching or nausea    Inpatient:  o Opioid: Morphine 2 mg IV Q1H PRN until PCA starts  o PCA plan:   - PCA button dose: Morphine 1 mg  - Lockout: 10 minutes  - Continuous Infusion: consider 1 mg/hr  - One hr max: 6 mg  o Other Medications: Benadryl, Zofran  o If PCA not working, she Has had success with ketamine starting at 4mg/h and advancing only to 6mg/h, as 8mg/h made her feel quite poorly.  o If  giving scheduled toradol, hold ASA (ok to give celebrex if she prefers)  o Supportive Care: Docusate, Senna  Chronic Pain Medications:    Home regimen  OxyCONTIN 10 mg po q 12 hrs and oxycodone 10 mg p.o. q.6 hours p.r.n. breakthrough pain.  She also continues on Celebrex, and Zoloft among other medications.    -Also benefits from mental health visits, acupuncture  Baseline Hemoglobin: 9.5 g/dL (on chronic transfusions), 7-8 without  Hydroxyurea use: Yes  H/O blood transfusions: Yes, several (iron overload) Most recent 5/22/20    H/O Transfusion Reactions: no    Antibodies:none  H/O Acute Chest Syndrome: Yes    Last episode: 10/2019     ICU/intubation: unsure  H/O Stroke: Yes (managed with chronic transfusions in the past)  H/O VTE: no  H/O Cholecystectomy or Splenectomy: no  H/O Asthma, Pulm HTN, AVN, Leg Ulcers, Nephropathy, Retinopathy, etc: Iron overload, asthma, chronic lung disease, developmental delay from early stroke    EMERGENCY CARE PLAN  DO NOT TRANSFUSE WITHOUT DISCUSSING WITH HEMATOLOGY ATTENDING (available 24/7).       TRANSFUSION IS RISKY DUE TO RISK OF ALLOIMMUNIZATION AGAINST RBC ANTIGENS AND IRON OVERLOAD.  INDICATIONS FOR TRANSFUSION ARE ACUTE STROKE AND ACUTE CHEST SYNDROME (hypoxia plus infiltrate, not chest pain).  DO NOT TRANSFUSE FOR ANEMIA      -------------------------------------------    Sickle Cell Disease Comprehensive Checklist    Bone Health/Avascular Necrosis Screening/Symptoms (each visit): none today    Leg Ulcer evaluation (every visit): none    Hypertension (every visit): none    Last ophthalmologic exam: within last year (timing unsure)    Last urinalysis for microalbuminuria/CKD (annually): within last year    Last pulmonary evaluation (asthma, AMAN, pulm HTN): within last year    Stroke/silent cerebral infarct Hx (Y/N): Yes    Last PCP Visit: 8/2020    Vaccines:  o PCV13: 5/13/19  o Pneumovax (PPSV23): 3/04, 10/09, 7/12/19 (next due 7/2024)  o Menactra: 4/2010, 9/2015 (MCV due  Sept 2020)  o Trumemba:  o Influenza: got 19-20 vaccine    Audiology (chelation): done 6/2020, normal    Past Medical History:  Past Medical History:   Diagnosis Date     Anemia      Anxiety      Bleeding disorder (H)      Blood clotting disorder (H)      Cerebral infarction (H) 2015     Cognitive developmental delay     low IQ. 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 (H)     right hand contractures     Iron overload due to repeated red blood cell transfusions      Migraines      Oppositional defiant behavior      Uncomplicated asthma        Past Surgical History:  Past Surgical History:   Procedure Laterality Date     AS INSERT TUNNELED CV 2 CATH W/O PORT/PUMP       C BREAST REDUCTION (INCLUDES LIPO) TIER 3 Bilateral 04/23/2019     IR CVC NON TUNNEL PLACEMENT  4/7/2020     REPAIR TENDON ELBOW Right 10/2/2019    Procedure: Right Elbow Flexor Lengthening, Flexor Pronator Slide Of Wrist and Finger, Thumb Adductor Lengthening;  Surgeon: Anai Franco MD;  Location: [facility] OR     TONSILLECTOMY Bilateral 10/2/2019    Procedure: Bilateral Tonsillectomy;  Surgeon: Farhana Guy MD;  Location: [facility] OR       Family History:   Family History   Problem Relation Age of Onset     Sickle Cell Trait Mother      Sickle Cell Trait Father        Social History:  Social History     Socioeconomic History     Marital status: Single     Spouse name: Not on file     Number of children: Not on file     Years of education: Not on file     Highest education level: Not on file   Occupational History     Not on file   Social Needs     Financial resource strain: Not on file     Food insecurity     Worry: Not on file     Inability: Not on file     Transportation needs     Medical: Not on file     Non-medical: Not on file   Tobacco Use     Smoking status: Never Smoker     Smokeless tobacco: Never  "Used   Substance and Sexual Activity     Alcohol use: Not Currently     Alcohol/week: 0.0 standard drinks     Drug use: Never     Sexual activity: Not Currently     Partners: Male     Birth control/protection: Other   Lifestyle     Physical activity     Days per week: Not on file     Minutes per session: Not on file     Stress: Not on file   Relationships     Social connections     Talks on phone: Not on file     Gets together: Not on file     Attends [employer] service: Not on file     Active member of club or organization: Not on file     Attends meetings of clubs or organizations: Not on file     Relationship status: Not on file     Intimate partner violence     Fear of current or ex partner: Not on file     Emotionally abused: Not on file     Physically abused: Not on file     Forced sexual activity: Not on file   Other Topics Concern     Parent/sibling w/ CABG, MI or angioplasty before 65F 55M? Not Asked   Social History Narrative    Lives with mom and extended family but \"toxic environment\" per her report. She would like to move out but cannot financially do so. She has minimal support at home despite her significant SCD comorbidities and cognitive delay from stroke.       Medications:  Current Outpatient Medications   Medication     acetaminophen (TYLENOL) 325 MG tablet     albuterol (PROVENTIL) (2.5 MG/3ML) 0.083% neb solution     aspirin (ASPIRIN) 81 MG EC tablet     Budesonide-Formoterol Fumarate (SYMBICORT IN)     celecoxib (CELEBREX) 100 MG capsule     diphenhydrAMINE (BENADRYL) 25 MG capsule     GNP SENNA-LAX 8.6 MG tablet     Hydroxyurea 1000 MG TABS     ibuprofen (ADVIL/MOTRIN) 200 MG tablet     JADENU 360 MG tablet     medroxyPROGESTERone (DEPO-PROVERA) 150 MG/ML IM injection     ondansetron (ZOFRAN) 8 MG tablet     oxyCODONE IR (ROXICODONE) 10 MG tablet     OXYCONTIN 10 MG 12 hr tablet     sertraline (ZOLOFT) 100 MG tablet     naloxone (NARCAN) 4 MG/0.1ML nasal spray     No current " facility-administered medications for this visit.          Physical Exam:   No exam (phone visit)    No recent labs    Imaging: none today    Assessment:  Jennifer Cervantes is a 20 year old female with HbSS. Her disease has been complicated by stroke leading to significant cognitive delay and right UE hemiparesis, high anxiety leading to frequent pain crises on top of chronic pain syndrome, poor social structure at home with no real support, and iron overload secondary to repeated transfusions. She is doing better today than a few weeks ago and is optimistic about continued improvements.     Plan:  1) HbSS and complications   -- Pain plan in chart. Not reviewed in this visit. reviewed and updated.    Heat packs, warm showers, Tylenol, ibuprofen. Refilled oxycodone and oxycontin and changed oxycodone to q6h PRN with instructions to call clinic if needing multiple doses              --She had been on q6 week transfusions (exchanged 4/7) Last transfusion early May. Had CVA ~2 yo and another TIA ~2014 during a trial off transfusions and just HU. My goal is to ultimately stop the transfusions given her significant iron overload but she has taken some time to become comfortable with this. She is ~3 months out from the last transfusion and is feeling like she needs another one so she can get one this week..   --Needs aggressive chelation. Jadenu now 1440 mg daily and well-tolerated per report but denies current use, consistent with rising ferritin. Audiology testing reassuring.   --Continue ASA post-stroke  2) Labs: CBC, CMP, ferritin  3) Medication Changes: Oxycodone back to q6h PRN. Hydroxyurea changed to 1000 mg tabs. Will consider an increase to 3000 mg soon.  5)  HCM: PCP is Daya Carter. Need to get SW closely involved.   6)  Psych: 200 mg Zoloft   7)  Facial numbness/tingling: resolved  8) Next visit: 4 weeks in person    It was a pleasure talking to Jennifer today and in continuing to guide her care and transition to  adult care moving forward.    I spent a total of 36 minutes on the phone with Jennifer during today's office visit. See note for details.        Eric Duncan MD  Hematologist  Division of Hematology, Oncology, and Transplantation  [facility]  [facility]  Pager: [phone]

## 2020-08-20 ENCOUNTER — THERAPY VISIT (OUTPATIENT)
Dept: OCCUPATIONAL THERAPY | Facility: CLINIC | Age: 20
End: 2020-08-20
Payer: COMMERCIAL

## 2020-08-20 DIAGNOSIS — D57.1 HB-SS DISEASE WITHOUT CRISIS (H): ICD-10-CM

## 2020-08-20 DIAGNOSIS — M25.621 STIFFNESS OF ELBOW JOINT, RIGHT: Primary | ICD-10-CM

## 2020-08-20 DIAGNOSIS — Z47.89 AFTERCARE FOLLOWING SURGERY OF THE MUSCULOSKELETAL SYSTEM: ICD-10-CM

## 2020-08-20 LAB
ABO + RH BLD: NORMAL
ABO + RH BLD: NORMAL
ALBUMIN SERPL-MCNC: 4 G/DL (ref 3.4–5)
ALP SERPL-CCNC: 69 U/L (ref 40–150)
ALT SERPL W P-5'-P-CCNC: 78 U/L (ref 0–50)
ANION GAP SERPL CALCULATED.3IONS-SCNC: 8 MMOL/L (ref 3–14)
ANISOCYTOSIS BLD QL SMEAR: ABNORMAL
AST SERPL W P-5'-P-CCNC: 91 U/L (ref 0–45)
BASOPHILS # BLD AUTO: 0.4 10E9/L (ref 0–0.2)
BASOPHILS NFR BLD AUTO: 3.5 %
BILIRUB SERPL-MCNC: 4.2 MG/DL (ref 0.2–1.3)
BLD GP AB SCN SERPL QL: NORMAL
BLD PROD TYP BPU: NORMAL
BLOOD BANK CMNT PATIENT-IMP: NORMAL
BUN SERPL-MCNC: 7 MG/DL (ref 7–30)
BURR CELLS BLD QL SMEAR: SLIGHT
CALCIUM SERPL-MCNC: 8.8 MG/DL (ref 8.5–10.1)
CHLORIDE SERPL-SCNC: 109 MMOL/L (ref 94–109)
CO2 SERPL-SCNC: 22 MMOL/L (ref 20–32)
CREAT SERPL-MCNC: 0.55 MG/DL (ref 0.52–1.04)
DIFFERENTIAL METHOD BLD: ABNORMAL
EOSINOPHIL # BLD AUTO: 0.1 10E9/L (ref 0–0.7)
EOSINOPHIL NFR BLD AUTO: 0.9 %
ERYTHROCYTE [DISTWIDTH] IN BLOOD BY AUTOMATED COUNT: 25.2 % (ref 10–15)
GFR SERPL CREATININE-BSD FRML MDRD: >90 ML/MIN/{1.73_M2}
GLUCOSE SERPL-MCNC: 83 MG/DL (ref 70–99)
HCT VFR BLD AUTO: 19.2 % (ref 35–47)
HGB BLD-MCNC: 6.9 G/DL (ref 11.7–15.7)
LYMPHOCYTES # BLD AUTO: 2.8 10E9/L (ref 0.8–5.3)
LYMPHOCYTES NFR BLD AUTO: 27.8 %
MCH RBC QN AUTO: 32.1 PG (ref 26.5–33)
MCHC RBC AUTO-ENTMCNC: 35.9 G/DL (ref 31.5–36.5)
MCV RBC AUTO: 89 FL (ref 78–100)
MONOCYTES # BLD AUTO: 0.4 10E9/L (ref 0–1.3)
MONOCYTES NFR BLD AUTO: 3.5 %
MYELOCYTES # BLD: 0.2 10E9/L
MYELOCYTES NFR BLD MANUAL: 1.7 %
NEUTROPHILS # BLD AUTO: 6.3 10E9/L (ref 1.6–8.3)
NEUTROPHILS NFR BLD AUTO: 62.6 %
NRBC # BLD AUTO: 0.8 10*3/UL
NRBC BLD AUTO-RTO: 8 /100
NUM BPU REQUESTED: 2
PLATELET # BLD AUTO: 371 10E9/L (ref 150–450)
PLATELET # BLD EST: ABNORMAL 10*3/UL
POIKILOCYTOSIS BLD QL SMEAR: ABNORMAL
POLYCHROMASIA BLD QL SMEAR: ABNORMAL
POTASSIUM SERPL-SCNC: 3.7 MMOL/L (ref 3.4–5.3)
PROT SERPL-MCNC: 7.8 G/DL (ref 6.8–8.8)
RBC # BLD AUTO: 2.15 10E12/L (ref 3.8–5.2)
SICKLE CELLS BLD QL SMEAR: ABNORMAL
SODIUM SERPL-SCNC: 139 MMOL/L (ref 133–144)
SPECIMEN EXP DATE BLD: NORMAL
TARGETS BLD QL SMEAR: ABNORMAL
WBC # BLD AUTO: 10 10E9/L (ref 4–11)

## 2020-08-20 PROCEDURE — 86901 BLOOD TYPING SEROLOGIC RH(D): CPT | Performed by: PEDIATRICS

## 2020-08-20 PROCEDURE — 97763 ORTHC/PROSTC MGMT SBSQ ENC: CPT | Mod: GO | Performed by: OCCUPATIONAL THERAPIST

## 2020-08-20 PROCEDURE — 80053 COMPREHEN METABOLIC PANEL: CPT | Performed by: PEDIATRICS

## 2020-08-20 PROCEDURE — 86850 RBC ANTIBODY SCREEN: CPT | Performed by: PEDIATRICS

## 2020-08-20 PROCEDURE — 86900 BLOOD TYPING SEROLOGIC ABO: CPT | Performed by: PEDIATRICS

## 2020-08-20 PROCEDURE — 85025 COMPLETE CBC W/AUTO DIFF WBC: CPT | Performed by: PEDIATRICS

## 2020-08-20 PROCEDURE — 86923 COMPATIBILITY TEST ELECTRIC: CPT | Performed by: PEDIATRICS

## 2020-08-20 PROCEDURE — 25000128 H RX IP 250 OP 636: Performed by: PEDIATRICS

## 2020-08-20 PROCEDURE — 86902 BLOOD TYPE ANTIGEN DONOR EA: CPT | Performed by: PEDIATRICS

## 2020-08-20 PROCEDURE — 97110 THERAPEUTIC EXERCISES: CPT | Mod: GO | Performed by: OCCUPATIONAL THERAPIST

## 2020-08-20 RX ORDER — HEPARIN SODIUM (PORCINE) LOCK FLUSH IV SOLN 100 UNIT/ML 100 UNIT/ML
5 SOLUTION INTRAVENOUS
Status: CANCELLED | OUTPATIENT
Start: 2020-08-20

## 2020-08-20 RX ORDER — HEPARIN SODIUM (PORCINE) LOCK FLUSH IV SOLN 100 UNIT/ML 100 UNIT/ML
5 SOLUTION INTRAVENOUS EVERY 8 HOURS
Status: DISCONTINUED | OUTPATIENT
Start: 2020-08-20 | End: 2020-08-28 | Stop reason: HOSPADM

## 2020-08-20 RX ORDER — HEPARIN SODIUM,PORCINE 10 UNIT/ML
5 VIAL (ML) INTRAVENOUS
Status: CANCELLED | OUTPATIENT
Start: 2020-08-20

## 2020-08-20 RX ADMIN — HEPARIN SODIUM (PORCINE) LOCK FLUSH IV SOLN 100 UNIT/ML 5 ML: 100 SOLUTION at 10:26

## 2020-08-20 NOTE — PROGRESS NOTES
Hand Therapy Initial Evaluation    Current Date:  8/20/2020    Diagnosis: Right Elbow Flexor Lengthening, Flexor Pronator Slide Of Wrist and Finger, Thumb Adductor Lengthening    DOS: 10/02/19  Procedure:  Right Elbow Flexor Lengthening, Flexor Pronator Slide Of Wrist and Finger, Thumb Adductor Lengthening     Precautions: NA    Subjective:  Jennifer Cervantes is a 20 year old female.    Patient reports symptoms of the right elbow, wrist, hand, thumb which occurred due to Elbow Flexor Lengthening, Flexor Pronator Slide Of Wrist and Finger, Thumb Adductor Lengthening, secondary to stroke as a toddler. Since onset symptoms are Gradually getting better.  Special tests:  none.  Previous treatment: surgery, post-op dressing, therapy.    General health as reported by patient is fair.  Pertinent medical history includes:Asthma, Depression, Implated Device, Mental Illness, Migraines/Headaches, Stroke, Chest Pain, Pain at Night/Rest, Severe Dizziness, Sickle Cell Anemia  Medical allergies:none.  Surgical history: orthopedic: R arm.  Medication history: Anti-depressants, Muscle Relaxants, Pain, Steroids, sleep.    Current occupation is currently looking for something  Currently not working due to present treatment problem  Job Tasks: NA    Occupational Profile Information:  Left hand dominant  Prior functional level:  independent-shared household chores  Patient reports symptoms of pain, stiffness/loss of motion, weakness/loss of strength and edema  Barriers include:transportation  Mobility: No difficulty  Transportation: public transportation and medical transportation  Leisure activities/hobbies: gym    Objective:  Pain Level (Scale 0-10)   10/24/2019 8/20/20   At Rest 7/10 0/10   At worst 7/10 0/10     Edema  none    Sensation   WNL throughout all nerve distributions; per patient report    ROM  Pain Report: - none  + mild    ++ moderate    +++ severe   Elbow 10/24/2019 8/20/20   AROM (PROM) R    Extension -46 -46 (-20)    Flexion 95    Supination + 0   Pronation 75 80     ROM  Pain Report: - none  + mild    ++ moderate    +++ severe   Wrist 10/24/2019 8/20/20   AROM (PROM) right    Extension (-19) -90 (-53)     Strength  contraindicated    Assessment:  Patient presents with symptoms consistent with diagnosis of right Right Elbow Flexor Lengthening, Flexor Pronator Slide Of Wrist and Finger, Thumb Adductor Lengthening,  with surgical  intervention.     Patient's limitations or Problem List includes:  Decreased ROM/motion and Weakness of the right elbow, wrist, hand and thumb which interferes with the patient's ability to perform Self Care Tasks (dressing, eating, bathing, hygiene/toileting), Work Tasks, Sleep Patterns, Recreational Activities, Household Chores and Driving  as compared to previous level of function.    Rehab Potential:  Good - Return to full activity, some limitations    Patient will benefit from skilled Occupational Therapy to increase ROM and decrease adherence of scarring to return to previous activity level and resume normal daily tasks and to reach their rehab potential.    Barriers to Learning:  No barrier    Communication Issues:  Patient appears to be able to clearly communicate and understand verbal and written communication and follow directions correctly.    Chart Review: Chart Review    Identified Performance Deficits: bathing/showering, toileting, dressing, feeding, hygiene and grooming, driving and community mobility, health management and maintenance, home establishment and management, meal preparation and cleanup, shopping and school    Assessment of Occupational Performance:  5 or more Performance Deficits    Clinical Decision Making (Complexity): Low complexity    Treatment Explanation:  The following has been discussed with the patient:  RX ordered/plan of care  Anticipated outcomes  Possible risks and side effects    Plan:  Frequency:  2 X a month, once daily  Duration:  for 3 months    Treatment  Plan:   Therapeutic Exercise:  AROM, AAROM, PROM and Tendon Gliding  Manual Techniques:  Myofascial release  Orthotic Fabrication:  Static progressive orthosis    Discharge Plan:  Achieve all LTG.  Independent in home treatment program.  Reach maximal therapeutic benefit.    Home Exercise Program:  Wrist orthosis at neutral during day  Resting hand at night  Long arm-emphasizing extension at night    Next Visit:  Orthosis modifications  AROM  Scar mgmt

## 2020-08-20 NOTE — NURSING NOTE
Chief Complaint   Patient presents with     Port Draw     Port labs collected by RN. Pt elects to keep port access for transfusion tomorrow.

## 2020-08-21 ENCOUNTER — INFUSION THERAPY VISIT (OUTPATIENT)
Dept: INFUSION THERAPY | Facility: CLINIC | Age: 20
End: 2020-08-21
Attending: PEDIATRICS
Payer: COMMERCIAL

## 2020-08-21 VITALS
DIASTOLIC BLOOD PRESSURE: 69 MMHG | SYSTOLIC BLOOD PRESSURE: 114 MMHG | TEMPERATURE: 98.4 F | HEART RATE: 75 BPM | OXYGEN SATURATION: 91 % | RESPIRATION RATE: 16 BRPM

## 2020-08-21 DIAGNOSIS — Z86.73 HISTORY OF STROKE: ICD-10-CM

## 2020-08-21 DIAGNOSIS — D57.1 HB-SS DISEASE WITHOUT CRISIS (H): Primary | ICD-10-CM

## 2020-08-21 LAB
ALBUMIN SERPL-MCNC: 4.2 G/DL (ref 3.4–5)
ALP SERPL-CCNC: 68 U/L (ref 40–150)
ALT SERPL W P-5'-P-CCNC: 70 U/L (ref 0–50)
ANION GAP SERPL CALCULATED.3IONS-SCNC: 8 MMOL/L (ref 3–14)
AST SERPL W P-5'-P-CCNC: 82 U/L (ref 0–45)
BILIRUB SERPL-MCNC: 4.4 MG/DL (ref 0.2–1.3)
BLD PROD TYP BPU: NORMAL
BLD PROD TYP BPU: NORMAL
BLD UNIT ID BPU: 0
BLD UNIT ID BPU: 0
BLOOD PRODUCT CODE: NORMAL
BLOOD PRODUCT CODE: NORMAL
BPU ID: NORMAL
BPU ID: NORMAL
BUN SERPL-MCNC: 11 MG/DL (ref 7–30)
CALCIUM SERPL-MCNC: 8.5 MG/DL (ref 8.5–10.1)
CHLORIDE SERPL-SCNC: 111 MMOL/L (ref 94–109)
CO2 SERPL-SCNC: 21 MMOL/L (ref 20–32)
CREAT SERPL-MCNC: 0.57 MG/DL (ref 0.52–1.04)
FERRITIN SERPL-MCNC: 6947 NG/ML (ref 12–150)
GFR SERPL CREATININE-BSD FRML MDRD: >90 ML/MIN/{1.73_M2}
GLUCOSE SERPL-MCNC: 82 MG/DL (ref 70–99)
POTASSIUM SERPL-SCNC: 4.1 MMOL/L (ref 3.4–5.3)
PROT SERPL-MCNC: 7.9 G/DL (ref 6.8–8.8)
SODIUM SERPL-SCNC: 140 MMOL/L (ref 133–144)
TRANSFUSION STATUS PATIENT QL: NORMAL

## 2020-08-21 PROCEDURE — 36430 TRANSFUSION BLD/BLD COMPNT: CPT

## 2020-08-21 PROCEDURE — P9016 RBC LEUKOCYTES REDUCED: HCPCS | Performed by: PEDIATRICS

## 2020-08-21 PROCEDURE — 82728 ASSAY OF FERRITIN: CPT | Performed by: PEDIATRICS

## 2020-08-21 PROCEDURE — 80053 COMPREHEN METABOLIC PANEL: CPT | Performed by: PEDIATRICS

## 2020-08-21 PROCEDURE — 25000128 H RX IP 250 OP 636: Mod: ZF | Performed by: PEDIATRICS

## 2020-08-21 RX ORDER — HEPARIN SODIUM (PORCINE) LOCK FLUSH IV SOLN 100 UNIT/ML 100 UNIT/ML
5 SOLUTION INTRAVENOUS
Status: DISCONTINUED | OUTPATIENT
Start: 2020-08-21 | End: 2020-08-21 | Stop reason: HOSPADM

## 2020-08-21 RX ADMIN — Medication 5 ML: at 16:14

## 2020-08-21 NOTE — PROGRESS NOTES
Blood Product Transfusion Nursing Note:    Jennifer Cervantes presents today to [facility] for 2 Units PRBCs for a hgb yesterday of 6.9..  During today's [facility] appointment orders from Dr JAS Duncan & RONALDO Allan were completed.    Progress note:  ID verified by name and .  Assessment completed.  Vitals were monitored throughout time in [facility]-see flowsheets.    Verbal and printed education given to patient/representative regarding transfusion and possible side effects.  Patient/representative verbalized understanding.     present during visit today: Not Applicable.    All pertinent labs reviewed prior to infusion: YES    Date of consent or authorization: 2020    Patient tolerated the transfusion well.     Each unit administered over approximately 1.5 hours.    At 12:50 (during 1st unit of blood), patient reported pain to RUE rated at 8/10. Patient stated it started about 20 minutes prior to her reporting it to RN. PRBC transfusion stopped and RONALDO Allan paged x 2 with no response; Dr Duncan paged with no response; RONALDO Pedraza paged and directed this RN to page Dr Garduno who was paged and stated to offer warm packs to arm and restart transfusion. Patient unable to describe the pain; requested hot pack which was given; states she has never had this pain before; denies any other symptoms. PRBCs restarted; no new complaints; patient reported no change to the pain to in her RUE at time of discharge. No swelling, warmth or discoloration noted to RUE. Instructed patient to notify MD if it persists or new symptoms develop. Patient verbalized understanding.     Discharge Plan:    Discharge instructions were reviewed with patient Yes  Patient/representative verbalized understanding of discharge instructions and all questions answered Yes.    Administrations This Visit     heparin 100 UNIT/ML injection 5 mL     Admin Date  2020 Action  Given Dose  5 mL Route  Intracatheter  Administered By  Sahra Frankel RN          sodium chloride (PF) 0.9% PF flush 3-20 mL     Admin Date  08/21/2020 Action  Given Dose  20 mL Route  Intracatheter Administered By  Sahra Frankel RN Reyanna L. JOE Frankel    /68 (BP Location: Left arm)   Pulse 85   Temp 98.2  F (36.8  C) (Oral)   Resp 16   SpO2 92%

## 2020-08-21 NOTE — LETTER
2020         RE: Jennifer Cervantes  [address]  [facility] [address]        Dear Colleague,    Thank you for referring your patient, Jennifer Cervantes, to the [facility] SPECIALTY AND PROCEDURE. Please see a copy of my visit note below.    Blood Product Transfusion Nursing Note:    Jennifer Cervantes presents today to [facility] for 2 Units PRBCs for a hgb yesterday of 6.9..  During today's [facility] appointment orders from Dr JAS Duncan & RONALDO Allan were completed.    Progress note:  ID verified by name and .  Assessment completed.  [facility]    Verbal and printed education given to patient/representative regarding transfusion and possible side effects.  Patient/representative verbalized understanding.     present during visit today: Not Applicable.    All pertinent labs reviewed prior to infusion: YES    Date of consent or authorization: 2020    Patient tolerated the transfusion well.     Each unit administered over approximately 1.5 hours.    At 12:50 (during 1st unit of blood), patient reported pain to RUE rated at 8/10. Patient stated it started about 20 minutes prior to her reporting it to RN. PRBC transfusion stopped and RONALDO Allan paged x 2 with no response; Dr Duncan paged with no response; RONALDO Pedraza paged and directed this RN to page Dr Garduno who was paged and stated to offer warm packs to arm and restart transfusion. Patient unable to describe the pain; requested hot pack which was given; states she has never had this pain before; denies any other symptoms. PRBCs restarted; no new complaints; patient reported no change to the pain to in her RUE at time of discharge. No swelling, warmth or discoloration noted to RUE. Instructed patient to notify MD if it persists or new symptoms develop. Patient verbalized understanding.     Discharge Plan:    Discharge instructions were reviewed  with patient Yes  Patient/representative verbalized understanding of discharge instructions and all questions answered Yes.    Administrations This Visit     heparin 100 UNIT/ML injection 5 mL     Admin Date  08/21/2020 Action  Given Dose  5 mL Route  Intracatheter Administered By  Sahra Frankel RN          sodium chloride (PF) 0.9% PF flush 3-20 mL     Admin Date  08/21/2020 Action  Given Dose  20 mL Route  Intracatheter Administered By  Sahra Frankel RN Reyanna L. Vosberg, RN    /68 (BP Location: Left arm)   Pulse 85   Temp 98.2  F (36.8  C) (Oral)   Resp 16   SpO2 92%           Again, thank you for allowing me to participate in the care of your patient.        Sincerely,        [facility]

## 2020-08-21 NOTE — PATIENT INSTRUCTIONS
HOME CARE FOR PATIENTS RECEIVING BLOOD PRODUCTS    You have just received a blood product.  During the next 48 hours please be aware of the following symptoms of a blood product reaction.    The possible symptoms of a blood reaction are:      Chills    Fever temperature above 100.0 orally    Headache    Nausea    Persistent non-productive cough    Hives    Itching    Facial swelling or flushing    Difficulty breathing or wheezing    Bloody urine      If you experience any of these symptoms contact:    [phone]  Emergency Room    If you do not live locally you should contact your local physician.

## 2020-09-01 DIAGNOSIS — D57.00 SICKLE CELL CRISIS (H): ICD-10-CM

## 2020-09-01 DIAGNOSIS — D57.1 HB-SS DISEASE WITHOUT CRISIS (H): ICD-10-CM

## 2020-09-01 RX ORDER — OXYCODONE HYDROCHLORIDE 10 MG/1
TABLET ORAL
Qty: 60 TABLET | Refills: 0 | Status: SHIPPED | OUTPATIENT
Start: 2020-09-01 | End: 2020-09-16

## 2020-09-01 NOTE — TELEPHONE ENCOUNTER
Pt requesting oxycodone 10mg refill. Is using 1 tab approx every 4 hours. No  info available to writer at this time.

## 2020-09-03 ENCOUNTER — THERAPY VISIT (OUTPATIENT)
Dept: OCCUPATIONAL THERAPY | Facility: CLINIC | Age: 20
End: 2020-09-03
Payer: COMMERCIAL

## 2020-09-03 ENCOUNTER — TRANSFERRED RECORDS (OUTPATIENT)
Dept: HEALTH INFORMATION MANAGEMENT | Facility: CLINIC | Age: 20
End: 2020-09-03

## 2020-09-03 DIAGNOSIS — M25.621 STIFFNESS OF ELBOW JOINT, RIGHT: Primary | ICD-10-CM

## 2020-09-03 PROCEDURE — 97110 THERAPEUTIC EXERCISES: CPT | Mod: GO | Performed by: OCCUPATIONAL THERAPIST

## 2020-09-03 PROCEDURE — 97763 ORTHC/PROSTC MGMT SBSQ ENC: CPT | Mod: GO | Performed by: OCCUPATIONAL THERAPIST

## 2020-09-03 NOTE — PROGRESS NOTES
SOAP note objective information for 9/3/2020.  ROM  Pain Report: - none  + mild    ++ moderate    +++ severe   Elbow 10/24/2019 7/14/20 9/3/20   AROM (PROM) R     Extension -46 -46 (-20) -43 (20)   Flexion 95     Supination + 0    Pronation 75 80      ROM  Pain Report: - none  + mild    ++ moderate    +++ severe   Wrist 10/24/2019 7/14/20 9/3/20   AROM (PROM) right     Extension (-19) -90 (-53)    Please refer to the daily flowsheet for treatment today, total treatment time and time spent performing 1:1 timed codes.       Home Exercise Program:  Wrist orthosis at neutral during day  Resting hand at night  Long arm-emphasizing extension at night    Next Visit:  Orthosis modifications  AROM  Scar mgmt

## 2020-09-11 ENCOUNTER — VIRTUAL VISIT (OUTPATIENT)
Dept: ONCOLOGY | Facility: CLINIC | Age: 20
End: 2020-09-11
Attending: PEDIATRICS
Payer: COMMERCIAL

## 2020-09-11 DIAGNOSIS — E83.111 HEMOCHROMATOSIS DUE TO REPEATED RED BLOOD CELL TRANSFUSIONS: ICD-10-CM

## 2020-09-11 DIAGNOSIS — D57.1 HB-SS DISEASE WITHOUT CRISIS (H): Primary | ICD-10-CM

## 2020-09-11 DIAGNOSIS — Z86.73 HISTORY OF STROKE: ICD-10-CM

## 2020-09-11 NOTE — PROGRESS NOTES
"[facility]ick[facility] Clinic Follow Up Outpatient Visit    Date of encounter: 9/11/2020      Jennifer Cervantes is a 20 year old female who is being evaluated via a billable telephone visit.      The patient has been notified of following:     \"This telephone visit will be conducted via a call between you and your physician/provider. We have found that certain health care needs can be provided without the need for a physical exam.  This service lets us provide the care you need with a short phone conversation.  If a prescription is necessary we can send it directly to your pharmacy.  If lab work is needed we can place an order for that and you can then stop by our lab to have the test done at a later time.    Telephone visits are billed at different rates depending on your insurance coverage. During this emergency period, for some insurers they may be billed the same as an in-person visit.  Please reach out to your insurance provider with any questions.    If during the course of the call the physician/provider feels a telephone visit is not appropriate, you will not be charged for this service.\"    Patient has given verbal consent for Telephone visit?  Yes    What phone number would you like to be contacted at? [phone]    How would you like to obtain your AVS? Pradipharsalma     Vitals - Patient Reported  Weight (Patient Reported): 70.3 kg (155 lb)  Height (Patient Reported): 162.6 cm (5' 4.02\")  BMI (Based on Pt Reported Ht/Wt): 26.59  Pain Score: No Pain (0)    Shahriar Sheldon LPN    Phone call duration: 11 minutes    Eric Duncan MD            Visit Notes    Jennifer Cervantes is a 20 year old female who is has a follow up outpatient hematology visit for Hb SS.    Jennifer's visit was done in clinic    Interval History  Jennifer is doing very well and is in a good mood. She is trying hard to stay out of the hospital and ED as much as possible and feels better about the multilayered approach that Julieta and I have tried to create " "and clarify for her. She did get a transfusion 3 weeks ago and she feels like it helped. She has regular OT for her right hand and arm and is happy with the progress and she wants to make some progress with her right leg. She also wants a new AFO as the current one is too big.    No recent illnesses. No ED visits. No sick contacts. No reports of bleeding, bruising, HA, new neurologic changes, changes in bowel or bladder habits, or difficulties with medications.        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 She also has significant anxiety and depression with a strong anxiety about transferring to the adult clinic. She usually has pain crises secondary to the anxiety. This \"fear of the unknown\" is significant enough that she wants to tour the inpatient floor before the transition or before she gets admitted there. She has been admitted to [facility] most of the last few months with recurrent pain crises and is actually out on pass now but is still admitted as of Friday 2/28. She has no real support from family at home and that, combined with her cognitive delays, make her care even more complex. She is having some back pain today which is a frequent location for her. She does say that her oral options do take an edge off. No fevers or cough, chest pain, dyspnea, bruising, or GI symptoms today. She has gotten a couple doses of Desferal for iron overload as she has been on chronic transfusions post-stroke for a long time. She is up to date on immunizations and got to meet with Daya Carter last week (also while on pass). [facility] but based on recent " history, she thinks she will have a pain crisis soon after discharge.    Key results prior to referral:  MR ABDOMEN W/O CONTRAST ([facility]), 10/3/2017  (unlikely to be most recent one)     Comparison: 12/28/2015   Clinical history: Sickle cell disease   Findings:     Average liver iron concentration:  28.6 mg/g dry tissue, previously 22.8 mg/g dry tissue (NR: 0.17-1.8)  513 mmol/kg dry tissue, previously 400.8 mmol/kg dry tissue (NR: 3-33)     There is also iron deposition in the spleen, which is enlarged.  Prominent vessels in the left upper quadrant, which may be related to varices, are unchanged from the comparison examinations.                                                            Impression:  1. Increase in elevated liver concentration.  2. Splenomegaly. Question portal hypertension.     WAYNE CURTIS MD    11/4/19  MRI Pelvis  Summary:  Marrow changes consistent with SCD but no convincing evidence of AVN at this time.     1/28/20 MRI/MRA  Comparison 5/8/15  Summary:  Stable appearance of the brain and cerebral vasculature compared to 5/8/2015, including remote left MCA teritory infarct. No evidence of new infarct or new abnormality on MRA    3/10/2020 Cardiac MRI      Review of systems:  A complete 14 point review of systems was completed. All were negative except for what was reported in the HPI or highlighted here.    --------------------------------  Plan last reviewed with patient: 8/17/2020    Patient background: 19yo F, enjoys movies and kids though there are times where she does not really want to talk to people. Does not have a lot of social support at home.     Sickle Cell Disease History  Primary Hematologist: Perla  PCP: Raul  Genotype: SS  Acute Pain Crisis Treatment:    ER/Acute Care/Infusion Clinic:   o Morphine 2 mg IVP/SC Q1H X 3 doses  o Toradol  o Maintenance IV fluids with D5 half-normal saline  o Other: Benadryl PO and Zofran 8 mg IV PRN itching or  nausea    Inpatient:  o Opioid: Morphine 2 mg IV Q1H PRN until PCA starts  o PCA plan:   - PCA button dose: Morphine 1 mg  - Lockout: 10 minutes  - Continuous Infusion: consider 1 mg/hr  - One hr max: 6 mg  o Other Medications: Benadryl, Zofran  o If PCA not working, she Has had success with ketamine starting at 4mg/h and advancing only to 6mg/h, as 8mg/h made her feel quite poorly.  o If giving scheduled toradol, hold ASA (ok to give celebrex if she prefers)  o Supportive Care: Docusate, Senna  Chronic Pain Medications:    Home regimen  OxyCONTIN 10 mg po q 12 hrs and oxycodone 10 mg p.o. q.6 hours p.r.n. breakthrough pain.  She also continues on Celebrex, and Zoloft among other medications.    -Also benefits from mental health visits, acupuncture  Baseline Hemoglobin: 9.5 g/dL (on chronic transfusions), 7-8 without  Hydroxyurea use: Yes  H/O blood transfusions: Yes, several (iron overload) Most recent 5/22/20    H/O Transfusion Reactions: no    Antibodies:none  H/O Acute Chest Syndrome: Yes    Last episode: 10/2019     ICU/intubation: unsure  H/O Stroke: Yes (managed with chronic transfusions in the past)  H/O VTE: no  H/O Cholecystectomy or Splenectomy: no  H/O Asthma, Pulm HTN, AVN, Leg Ulcers, Nephropathy, Retinopathy, etc: Iron overload, asthma, chronic lung disease, developmental delay from early stroke    EMERGENCY CARE PLAN  DO NOT TRANSFUSE WITHOUT DISCUSSING WITH HEMATOLOGY ATTENDING (available 24/7).       TRANSFUSION IS RISKY DUE TO RISK OF ALLOIMMUNIZATION AGAINST RBC ANTIGENS AND IRON OVERLOAD.  INDICATIONS FOR TRANSFUSION ARE ACUTE STROKE AND ACUTE CHEST SYNDROME (hypoxia plus infiltrate, not chest pain).  DO NOT TRANSFUSE FOR ANEMIA      -------------------------------------------    Sickle Cell Disease Comprehensive Checklist    Bone Health/Avascular Necrosis Screening/Symptoms (each visit): none today    Leg Ulcer evaluation (every visit): none    Hypertension (every visit): none    Last  ophthalmologic exam: within last year (timing unsure)    Last urinalysis for microalbuminuria/CKD (annually): within last year    Last pulmonary evaluation (asthma, AMAN, pulm HTN): within last year    Stroke/silent cerebral infarct Hx (Y/N): Yes    Last PCP Visit: 8/2020    Vaccines:  o PCV13: 5/13/19  o Pneumovax (PPSV23): 3/04, 10/09, 7/12/19 (next due 7/2024)  o Menactra: 4/2010, 9/2015 (MCV due Sept 2020)  o Trumemba:  o Influenza: got 19-20 vaccine    Audiology (chelation): done 6/2020, normal    Past Medical History:  Past Medical History:   Diagnosis Date     Anemia      Anxiety      Bleeding disorder (H)      Blood clotting disorder (H)      Cerebral infarction (H) 2015     Cognitive developmental delay     low IQ. 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 (H)     right hand contractures     Iron overload due to repeated red blood cell transfusions      Migraines      Oppositional defiant behavior      Uncomplicated asthma        Past Surgical History:  Past Surgical History:   Procedure Laterality Date     AS INSERT TUNNELED CV 2 CATH W/O PORT/PUMP       C BREAST REDUCTION (INCLUDES LIPO) TIER 3 Bilateral 04/23/2019     IR CVC NON TUNNEL PLACEMENT  4/7/2020     REPAIR TENDON ELBOW Right 10/2/2019    Procedure: Right Elbow Flexor Lengthening, Flexor Pronator Slide Of Wrist and Finger, Thumb Adductor Lengthening;  Surgeon: Anai Franco MD;  Location: [facility] OR     TONSILLECTOMY Bilateral 10/2/2019    Procedure: Bilateral Tonsillectomy;  Surgeon: Farhana Guy MD;  Location: [facility] OR       Family History:   Family History   Problem Relation Age of Onset     Sickle Cell Trait Mother      Sickle Cell Trait Father        Social History:  Social History     Socioeconomic History     Marital status: Single     Spouse name: Not on file     Number of children: Not on  "file     Years of education: Not on file     Highest education level: Not on file   Occupational History     Not on file   Social Needs     Financial resource strain: Not on file     Food insecurity     Worry: Not on file     Inability: Not on file     Transportation needs     Medical: Not on file     Non-medical: Not on file   Tobacco Use     Smoking status: Never Smoker     Smokeless tobacco: Never Used   Substance and Sexual Activity     Alcohol use: Not Currently     Alcohol/week: 0.0 standard drinks     Drug use: Never     Sexual activity: Not Currently     Partners: Male     Birth control/protection: Other   Lifestyle     Physical activity     Days per week: Not on file     Minutes per session: Not on file     Stress: Not on file   Relationships     Social connections     Talks on phone: Not on file     Gets together: Not on file     Attends Mandaeism service: Not on file     Active member of club or organization: Not on file     Attends meetings of clubs or organizations: Not on file     Relationship status: Not on file     Intimate partner violence     Fear of current or ex partner: Not on file     Emotionally abused: Not on file     Physically abused: Not on file     Forced sexual activity: Not on file   Other Topics Concern     Parent/sibling w/ CABG, MI or angioplasty before 65F 55M? Not Asked   Social History Narrative    Lives with mom and extended family but \"toxic environment\" per her report. She would like to move out but cannot financially do so. She has minimal support at home despite her significant SCD comorbidities and cognitive delay from stroke.       Medications:  Current Outpatient Medications   Medication     acetaminophen (TYLENOL) 325 MG tablet     albuterol (PROVENTIL) (2.5 MG/3ML) 0.083% neb solution     aspirin (ASPIRIN) 81 MG EC tablet     Budesonide-Formoterol Fumarate (SYMBICORT IN)     celecoxib (CELEBREX) 100 MG capsule     diphenhydrAMINE (BENADRYL) 25 MG capsule     GNP " SENNA-LAX 8.6 MG tablet     Hydroxyurea 1000 MG TABS     ibuprofen (ADVIL/MOTRIN) 200 MG tablet     JADENU 360 MG tablet     medroxyPROGESTERone (DEPO-PROVERA) 150 MG/ML IM injection     naloxone (NARCAN) 4 MG/0.1ML nasal spray     ondansetron (ZOFRAN) 8 MG tablet     oxyCODONE IR (ROXICODONE) 10 MG tablet     OXYCONTIN 10 MG 12 hr tablet     sertraline (ZOLOFT) 100 MG tablet     No current facility-administered medications for this visit.          Physical Exam:   No exam (phone visit)    No recent labs    Imaging: none today    Assessment:  Jennifer Cervantes is a 20 year old female with HbSS. Her disease has been complicated by stroke leading to significant cognitive delay and right UE hemiparesis, high anxiety leading to frequent pain crises on top of chronic pain syndrome, poor social structure at home with no real support, and iron overload secondary to repeated transfusions. She is doing better today than a few weeks ago and is optimistic about continued improvements. She does want to engage in PT for her right leg and is very happy with the progress on the OT side with her right arm    Plan:  1) HbSS and complications   -- Pain plan in chart. Not reviewed in this visit. reviewed and updated.    Heat packs, warm showers, Tylenol, ibuprofen. Refilled oxycodone and oxycontin and changed oxycodone to q6h PRN with instructions to call clinic if needing multiple doses              --She had been on q6 week transfusions (exchanged 4/7) Last transfusion early May. Had CVA ~2 yo and another TIA ~2014 during a trial off transfusions and just HU. My goal is to ultimately stop the transfusions given her significant iron overload but she has taken some time to become comfortable with this. She is ~3 months out from the last transfusion  (8/21) but had gone 3 months prior to that..   --Needs aggressive chelation. Jadenu now 1440 mg daily and well-tolerated per report. She is back on it and ferritin is dropping again. Audiology  testing reassuring.   --Continue ASA post-stroke. PT referral placed to continue with post-stroke rehab on RLE  2) Labs: CBC, CMP, ferritin  3) Medication Changes: Oxycodone back to q6h PRN. Hydroxyurea recently changed to 1000 mg tabs. Will consider an increase to 3000 mg soon.  5)  HCM: PCP is Daya Carter. Need to get SW closely involved.   6)  Psych: 200 mg Zoloft   7)  Facial numbness/tingling: resolved  8) Next visit: 4 weeks in person    It was a pleasure talking to Jennifer today and in continuing to guide her care and transition to adult care moving forward.    I spent a total of 11 minutes on the phone with Jennifer during today's office visit. See note for details.        Eric Duncan MD  Hematologist  Division of Hematology, Oncology, and Transplantation  [facility]  [facility]  Pager: [phone]

## 2020-09-11 NOTE — LETTER
"9/11/2020     RE: Jennifer Cervantes  [address]  [facility] [address]    Sickle Cell Clinic Follow Up Outpatient Visit    Date of encounter: 9/11/2020      Jennifer Cervantes is a 20 year old female who is being evaluated via a billable telephone visit.      The patient has been notified of following:     \"This telephone visit will be conducted via a call between you and your physician/provider. We have found that certain health care needs can be provided without the need for a physical exam.  This service lets us provide the care you need with a short phone conversation.  If a prescription is necessary we can send it directly to your pharmacy.  If lab work is needed we can place an order for that and you can then stop by our lab to have the test done at a later time.    Telephone visits are billed at different rates depending on your insurance coverage. During this emergency period, for some insurers they may be billed the same as an in-person visit.  Please reach out to your insurance provider with any questions.    If during the course of the call the physician/provider feels a telephone visit is not appropriate, you will not be charged for this service.\"    Patient has given verbal consent for Telephone visit?  Yes    What phone number would you like to be contacted at? [phone]    How would you like to obtain your AVS? MyChart     Vitals - Patient Reported  Weight (Patient Reported): 70.3 kg (155 lb)  Height (Patient Reported): 162.6 cm (5' 4.02\")  BMI (Based on Pt Reported Ht/Wt): 26.59  Pain Score: No Pain (0)    Shahriar Sheldon LPN    Phone call duration: 11 minutes    Eric Duncan MD            Visit Notes    Jennifer Cervantes is a 20 year old female who is has a follow up outpatient hematology visit for Hb SS.    Jennifer's visit was done in clinic    Interval History  Jennifer is doing very well and is in a good mood. She is trying hard to stay out of the hospital and ED as much as possible and " "feels better about the multilayered approach that Julieta and I have tried to create and clarify for her. She did get a transfusion 3 weeks ago and she feels like it helped. She has regular OT for her right hand and arm and is happy with the progress and she wants to make some progress with her right leg. She also wants a new AFO as the current one is too big.    No recent illnesses. No ED visits. No sick contacts. No reports of bleeding, bruising, HA, new neurologic changes, changes in bowel or bladder habits, or difficulties with medications.        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 She also has significant anxiety and depression with a strong anxiety about transferring to the adult clinic. She usually has pain crises secondary to the anxiety. This \"fear of the unknown\" is significant enough that she wants to tour the inpatient floor before the transition or before she gets admitted there. [facility] the last few months with recurrent pain crises and is actually out on pass now but is still admitted as of Friday 2/28. She has no real support from family at home and that, combined with her cognitive delays, make her care even more complex. She is having some back pain today which is a frequent location for her. She does say that her oral options do take an edge off. No fevers or cough, chest pain, dyspnea, bruising, or GI symptoms today. She has gotten a couple doses of Desferal for iron overload as she has been on chronic transfusions post-stroke for a long time. She is up to date on immunizations and got to meet with Daya Carter last week (also while on " pass). It is unclear when she will discharge from [facility] but based on recent history, she thinks she will have a pain crisis soon after discharge.    Key results prior to referral:  MR ABDOMEN W/O CONTRAST ([facility]), 10/3/2017  (unlikely to be most recent one)     Comparison: 12/28/2015   Clinical history: Sickle cell disease   Findings:     Average liver iron concentration:  28.6 mg/g dry tissue, previously 22.8 mg/g dry tissue (NR: 0.17-1.8)  513 mmol/kg dry tissue, previously 400.8 mmol/kg dry tissue (NR: 3-33)     There is also iron deposition in the spleen, which is enlarged.  Prominent vessels in the left upper quadrant, which may be related to varices, are unchanged from the comparison examinations.                                                            Impression:  1. Increase in elevated liver concentration.  2. Splenomegaly. Question portal hypertension.     WAYNE CURTIS MD    11/4/19  MRI Pelvis  Summary:  Marrow changes consistent with SCD but no convincing evidence of AVN at this time.     1/28/20 MRI/MRA  Comparison 5/8/15  Summary:  Stable appearance of the brain and cerebral vasculature compared to 5/8/2015, including remote left MCA teritory infarct. No evidence of new infarct or new abnormality on MRA    3/10/2020 Cardiac MRI      Review of systems:  A complete 14 point review of systems was completed. All were negative except for what was reported in the HPI or highlighted here.    --------------------------------  Plan last reviewed with patient: 8/17/2020    Patient background: 19yo F, enjoys movies and kids though there are times where she does not really want to talk to people. Does not have a lot of social support at home.     Sickle Cell Disease History  Primary Hematologist: Perla  PCP: Raul  Genotype: SS  Acute Pain Crisis Treatment:    ER/Acute Care/Infusion Clinic:   o Morphine 2 mg IVP/SC Q1H X 3 doses  o Toradol  o Maintenance IV fluids with D5 half-normal  saline  o Other: Benadryl PO and Zofran 8 mg IV PRN itching or nausea    Inpatient:  o Opioid: Morphine 2 mg IV Q1H PRN until PCA starts  o PCA plan:   - PCA button dose: Morphine 1 mg  - Lockout: 10 minutes  - Continuous Infusion: consider 1 mg/hr  - One hr max: 6 mg  o Other Medications: Benadryl, Zofran  o If PCA not working, she Has had success with ketamine starting at 4mg/h and advancing only to 6mg/h, as 8mg/h made her feel quite poorly.  o If giving scheduled toradol, hold ASA (ok to give celebrex if she prefers)  o Supportive Care: Docusate, Senna  Chronic Pain Medications:    Home regimen  OxyCONTIN 10 mg po q 12 hrs and oxycodone 10 mg p.o. q.6 hours p.r.n. breakthrough pain.  She also continues on Celebrex, and Zoloft among other medications.    -Also benefits from mental health visits, acupuncture  Baseline Hemoglobin: 9.5 g/dL (on chronic transfusions), 7-8 without  Hydroxyurea use: Yes  H/O blood transfusions: Yes, several (iron overload) Most recent 5/22/20    H/O Transfusion Reactions: no    Antibodies:none  H/O Acute Chest Syndrome: Yes    Last episode: 10/2019     ICU/intubation: unsure  H/O Stroke: Yes (managed with chronic transfusions in the past)  H/O VTE: no  H/O Cholecystectomy or Splenectomy: no  H/O Asthma, Pulm HTN, AVN, Leg Ulcers, Nephropathy, Retinopathy, etc: Iron overload, asthma, chronic lung disease, developmental delay from early stroke    EMERGENCY CARE PLAN  DO NOT TRANSFUSE WITHOUT DISCUSSING WITH HEMATOLOGY ATTENDING (available 24/7).       TRANSFUSION IS RISKY DUE TO RISK OF ALLOIMMUNIZATION AGAINST RBC ANTIGENS AND IRON OVERLOAD.  INDICATIONS FOR TRANSFUSION ARE ACUTE STROKE AND ACUTE CHEST SYNDROME (hypoxia plus infiltrate, not chest pain).  DO NOT TRANSFUSE FOR ANEMIA      -------------------------------------------    Sickle Cell Disease Comprehensive Checklist    Bone Health/Avascular Necrosis Screening/Symptoms (each visit): none today    Leg Ulcer evaluation (every  visit): none    Hypertension (every visit): none    Last ophthalmologic exam: within last year (timing unsure)    Last urinalysis for microalbuminuria/CKD (annually): within last year    Last pulmonary evaluation (asthma, AMAN, pulm HTN): within last year    Stroke/silent cerebral infarct Hx (Y/N): Yes    Last PCP Visit: 8/2020    Vaccines:  o PCV13: 5/13/19  o Pneumovax (PPSV23): 3/04, 10/09, 7/12/19 (next due 7/2024)  o Menactra: 4/2010, 9/2015 (MCV due Sept 2020)  o Trumemba:  o Influenza: got 19-20 vaccine    Audiology (chelation): done 6/2020, normal    Past Medical History:  Past Medical History:   Diagnosis Date     Anemia      Anxiety      Bleeding disorder (H)      Blood clotting disorder (H)      Cerebral infarction (H) 2015     Cognitive developmental delay     low IQ. 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 (H)     right hand contractures     Iron overload due to repeated red blood cell transfusions      Migraines      Oppositional defiant behavior      Uncomplicated asthma        Past Surgical History:  Past Surgical History:   Procedure Laterality Date     AS INSERT TUNNELED CV 2 CATH W/O PORT/PUMP       C BREAST REDUCTION (INCLUDES LIPO) TIER 3 Bilateral 04/23/2019     IR CVC NON TUNNEL PLACEMENT  4/7/2020     REPAIR TENDON ELBOW Right 10/2/2019    Procedure: Right Elbow Flexor Lengthening, Flexor Pronator Slide Of Wrist and Finger, Thumb Adductor Lengthening;  Surgeon: Anai Franco MD;  Location: [facility] OR     TONSILLECTOMY Bilateral 10/2/2019    Procedure: Bilateral Tonsillectomy;  Surgeon: Farhana Guy MD;  Location: [facility] OR       Family History:   Family History   Problem Relation Age of Onset     Sickle Cell Trait Mother      Sickle Cell Trait Father        Social History:  Social History     Socioeconomic History     Marital status: Single      "Spouse name: Not on file     Number of children: Not on file     Years of education: Not on file     Highest education level: Not on file   Occupational History     Not on file   Social Needs     Financial resource strain: Not on file     Food insecurity     Worry: Not on file     Inability: Not on file     Transportation needs     Medical: Not on file     Non-medical: Not on file   Tobacco Use     Smoking status: Never Smoker     Smokeless tobacco: Never Used   Substance and Sexual Activity     Alcohol use: Not Currently     Alcohol/week: 0.0 standard drinks     Drug use: Never     Sexual activity: Not Currently     Partners: Male     Birth control/protection: Other   Lifestyle     Physical activity     Days per week: Not on file     Minutes per session: Not on file     Stress: Not on file   Relationships     Social connections     Talks on phone: Not on file     Gets together: Not on file     Attends Je[employer]ah's witness service: Not on file     Active member of club or organization: Not on file     Attends meetings of clubs or organizations: Not on file     Relationship status: Not on file     Intimate partner violence     Fear of current or ex partner: Not on file     Emotionally abused: Not on file     Physically abused: Not on file     Forced sexual activity: Not on file   Other Topics Concern     Parent/sibling w/ CABG, MI or angioplasty before 65F 55M? Not Asked   Social History Narrative    Lives with mom and extended family but \"toxic environment\" per her report. She would like to move out but cannot financially do so. She has minimal support at home despite her significant SCD comorbidities and cognitive delay from stroke.       Medications:  Current Outpatient Medications   Medication     acetaminophen (TYLENOL) 325 MG tablet     albuterol (PROVENTIL) (2.5 MG/3ML) 0.083% neb solution     aspirin (ASPIRIN) 81 MG EC tablet     Budesonide-Formoterol Fumarate (SYMBICORT IN)     celecoxib (CELEBREX) 100 MG capsule     " diphenhydrAMINE (BENADRYL) 25 MG capsule     GNP SENNA-LAX 8.6 MG tablet     Hydroxyurea 1000 MG TABS     ibuprofen (ADVIL/MOTRIN) 200 MG tablet     JADENU 360 MG tablet     medroxyPROGESTERone (DEPO-PROVERA) 150 MG/ML IM injection     naloxone (NARCAN) 4 MG/0.1ML nasal spray     ondansetron (ZOFRAN) 8 MG tablet     oxyCODONE IR (ROXICODONE) 10 MG tablet     OXYCONTIN 10 MG 12 hr tablet     sertraline (ZOLOFT) 100 MG tablet     No current facility-administered medications for this visit.          Physical Exam:   No exam (phone visit)    No recent labs    Imaging: none today    Assessment:  Jennifer Cervantes is a 20 year old female with HbSS. Her disease has been complicated by stroke leading to significant cognitive delay and right UE hemiparesis, high anxiety leading to frequent pain crises on top of chronic pain syndrome, poor social structure at home with no real support, and iron overload secondary to repeated transfusions. She is doing better today than a few weeks ago and is optimistic about continued improvements. She does want to engage in PT for her right leg and is very happy with the progress on the OT side with her right arm    Plan:  1) HbSS and complications   -- Pain plan in chart. Not reviewed in this visit. reviewed and updated.    Heat packs, warm showers, Tylenol, ibuprofen. Refilled oxycodone and oxycontin and changed oxycodone to q6h PRN with instructions to call clinic if needing multiple doses              --She had been on q6 week transfusions (exchanged 4/7) Last transfusion early May. Had CVA ~1 yo and another TIA ~2014 during a trial off transfusions and just HU. My goal is to ultimately stop the transfusions given her significant iron overload but she has taken some time to become comfortable with this. She is ~3 months out from the last transfusion  (8/21) but had gone 3 months prior to that..   --Needs aggressive chelation. Jadenu now 1440 mg daily and well-tolerated per report. She is  back on it and ferritin is dropping again. Audiology testing reassuring.   --Continue ASA post-stroke. PT referral placed to continue with post-stroke rehab on RLE  2) Labs: CBC, CMP, ferritin  3) Medication Changes: Oxycodone back to q6h PRN. Hydroxyurea recently changed to 1000 mg tabs. Will consider an increase to 3000 mg soon.  5)  HCM: PCP is Daya Carter. Need to get SW closely involved.   6)  Psych: 200 mg Zoloft   7)  Facial numbness/tingling: resolved  8) Next visit: 4 weeks in person    It was a pleasure talking to Jennifer today and in continuing to guide her care and transition to adult care moving forward.    I spent a total of 11 minutes on the phone with Jennifer during today's office visit. See note for details.      Eric Duncan MD  Hematologist  Division of Hematology, Oncology, and Transplantation  [facility]  [facility]  Pager: [phone]

## 2020-09-11 NOTE — LETTER
"    9/11/2020         RE: Jennifer Cervantes  [address]  [facility] [address]        Dear Colleague,    Thank you for referring your patient, Jennifer Cervantes, to the [facility]. Please see a copy of my visit note below.    Sickle Cell Clinic Follow Up Outpatient Visit    Date of encounter: 9/11/2020      Jennifer Cervantes is a 20 year old female who is being evaluated via a billable telephone visit.      The patient has been notified of following:     \"This telephone visit will be conducted via a call between you and your physician/provider. We have found that certain health care needs can be provided without the need for a physical exam.  This service lets us provide the care you need with a short phone conversation.  If a prescription is necessary we can send it directly to your pharmacy.  If lab work is needed we can place an order for that and you can then stop by our lab to have the test done at a later time.    Telephone visits are billed at different rates depending on your insurance coverage. During this emergency period, for some insurers they may be billed the same as an in-person visit.  Please reach out to your insurance provider with any questions.    If during the course of the call the physician/provider feels a telephone visit is not appropriate, you will not be charged for this service.\"    Patient has given verbal consent for Telephone visit?  Yes    What phone number would you like to be contacted at? [phone]    How would you like to obtain your AVS? Leonarda     Vitals - Patient Reported  Weight (Patient Reported): 70.3 kg (155 lb)  Height (Patient Reported): 162.6 cm (5' 4.02\")  BMI (Based on Pt Reported Ht/Wt): 26.59  Pain Score: No Pain (0)    Shahriar Sheldon LPN    Phone call duration: 11 minutes    Eric Duncan MD            Visit Notes    Jennifer Cervantes is a 20 year old female who is has a follow up outpatient hematology visit for Hb SS.    Jennifer's visit was " "done in clinic    Interval History  Jennifer is doing very well and is in a good mood. She is trying hard to stay out of the hospital and ED as much as possible and feels better about the multilayered approach that Julieta and I have tried to create and clarify for her. She did get a transfusion 3 weeks ago and she feels like it helped. She has regular OT for her right hand and arm and is happy with the progress and she wants to make some progress with her right leg. She also wants a new AFO as the current one is too big.    No recent illnesses. No ED visits. No sick contacts. No reports of bleeding, bruising, HA, new neurologic changes, changes in bowel or bladder habits, or difficulties with medications.        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 She also has significant anxiety and depression with a strong anxiety about transferring to the adult clinic. She usually has pain crises secondary to the anxiety. This \"fear of the unknown\" is significant enough that she wants to tour the inpatient floor before the transition or before she gets admitted there. [facility] with recurrent pain crises and is actually out on pass now but is still admitted as of Friday 2/28. She has no real support from family at home and that, combined with her cognitive delays, make her care even more complex. She is having some back pain today which is a frequent location for her. She does say that her oral options do take an edge off. No fevers or cough, chest pain, dyspnea, bruising, or GI symptoms today. She has gotten a couple doses of Desferal for iron overload as she " has been on chronic transfusions post-stroke for a long time. She is up to date on immunizations and got to meet with Daya Carter last week (also while on pass). It is unclear when she will discharge from [facility] but based on recent history, she thinks she will have a pain crisis soon after discharge.    Key results prior to referral:  MR ABDOMEN W/O CONTRAST ([facility]), 10/3/2017  (unlikely to be most recent one)     Comparison: 12/28/2015   Clinical history: Sickle cell disease   Findings:     Average liver iron concentration:  28.6 mg/g dry tissue, previously 22.8 mg/g dry tissue (NR: 0.17-1.8)  513 mmol/kg dry tissue, previously 400.8 mmol/kg dry tissue (NR: 3-33)     There is also iron deposition in the spleen, which is enlarged.  Prominent vessels in the left upper quadrant, which may be related to varices, are unchanged from the comparison examinations.                                                            Impression:  1. Increase in elevated liver concentration.  2. Splenomegaly. Question portal hypertension.     WAYNE CURTIS MD    11/4/19  MRI Pelvis  Summary:  Marrow changes consistent with SCD but no convincing evidence of AVN at this time.     1/28/20 MRI/MRA  Comparison 5/8/15  Summary:  Stable appearance of the brain and cerebral vasculature compared to 5/8/2015, including remote left MCA teritory infarct. No evidence of new infarct or new abnormality on MRA    3/10/2020 Cardiac MRI      Review of systems:  A complete 14 point review of systems was completed. All were negative except for what was reported in the HPI or highlighted here.    --------------------------------  Plan last reviewed with patient: 8/17/2020    Patient background: 19yo F, enjoys movies and kids though there are times where she does not really want to talk to people. Does not have a lot of social support at home.     Sickle Cell Disease History  Primary Hematologist: Perla  PCP: Raul  Genotype: SS  Acute Pain  Crisis Treatment:    ER/Acute Care/Infusion Clinic:   o Morphine 2 mg IVP/SC Q1H X 3 doses  o Toradol  o Maintenance IV fluids with D5 half-normal saline  o Other: Benadryl PO and Zofran 8 mg IV PRN itching or nausea    Inpatient:  o Opioid: Morphine 2 mg IV Q1H PRN until PCA starts  o PCA plan:   - PCA button dose: Morphine 1 mg  - Lockout: 10 minutes  - Continuous Infusion: consider 1 mg/hr  - One hr max: 6 mg  o Other Medications: Benadryl, Zofran  o If PCA not working, she Has had success with ketamine starting at 4mg/h and advancing only to 6mg/h, as 8mg/h made her feel quite poorly.  o If giving scheduled toradol, hold ASA (ok to give celebrex if she prefers)  o Supportive Care: Docusate, Senna  Chronic Pain Medications:    Home regimen  OxyCONTIN 10 mg po q 12 hrs and oxycodone 10 mg p.o. q.6 hours p.r.n. breakthrough pain.  She also continues on Celebrex, and Zoloft among other medications.    -Also benefits from mental health visits, acupuncture  Baseline Hemoglobin: 9.5 g/dL (on chronic transfusions), 7-8 without  Hydroxyurea use: Yes  H/O blood transfusions: Yes, several (iron overload) Most recent 5/22/20    H/O Transfusion Reactions: no    Antibodies:none  H/O Acute Chest Syndrome: Yes    Last episode: 10/2019     ICU/intubation: unsure  H/O Stroke: Yes (managed with chronic transfusions in the past)  H/O VTE: no  H/O Cholecystectomy or Splenectomy: no  H/O Asthma, Pulm HTN, AVN, Leg Ulcers, Nephropathy, Retinopathy, etc: Iron overload, asthma, chronic lung disease, developmental delay from early stroke    EMERGENCY CARE PLAN  DO NOT TRANSFUSE WITHOUT DISCUSSING WITH HEMATOLOGY ATTENDING (available 24/7).       TRANSFUSION IS RISKY DUE TO RISK OF ALLOIMMUNIZATION AGAINST RBC ANTIGENS AND IRON OVERLOAD.  INDICATIONS FOR TRANSFUSION ARE ACUTE STROKE AND ACUTE CHEST SYNDROME (hypoxia plus infiltrate, not chest pain).  DO NOT TRANSFUSE FOR ANEMIA      -------------------------------------------    Sickle  Cell Disease Comprehensive Checklist    Bone Health/Avascular Necrosis Screening/Symptoms (each visit): none today    Leg Ulcer evaluation (every visit): none    Hypertension (every visit): none    Last ophthalmologic exam: within last year (timing unsure)    Last urinalysis for microalbuminuria/CKD (annually): within last year    Last pulmonary evaluation (asthma, AMAN, pulm HTN): within last year    Stroke/silent cerebral infarct Hx (Y/N): Yes    Last PCP Visit: 8/2020    Vaccines:  o PCV13: 5/13/19  o Pneumovax (PPSV23): 3/04, 10/09, 7/12/19 (next due 7/2024)  o Menactra: 4/2010, 9/2015 (MCV due Sept 2020)  o Trumemba:  o Influenza: got 19-20 vaccine    Audiology (chelation): done 6/2020, normal    Past Medical History:  Past Medical History:   Diagnosis Date     Anemia      Anxiety      Bleeding disorder (H)      Blood clotting disorder (H)      Cerebral infarction (H) 2015     Cognitive developmental delay     low IQ. 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 (H)     right hand contractures     Iron overload due to repeated red blood cell transfusions      Migraines      Oppositional defiant behavior      Uncomplicated asthma        Past Surgical History:  Past Surgical History:   Procedure Laterality Date     AS INSERT TUNNELED CV 2 CATH W/O PORT/PUMP       C BREAST REDUCTION (INCLUDES LIPO) TIER 3 Bilateral 04/23/2019     IR CVC NON TUNNEL PLACEMENT  4/7/2020     REPAIR TENDON ELBOW Right 10/2/2019    Procedure: Right Elbow Flexor Lengthening, Flexor Pronator Slide Of Wrist and Finger, Thumb Adductor Lengthening;  Surgeon: Anai Franco MD;  Location: [facility] OR     TONSILLECTOMY Bilateral 10/2/2019    Procedure: Bilateral Tonsillectomy;  Surgeon: Farhana Guy MD;  Location: [facility] OR       Family History:   Family History   Problem Relation Age of Onset     Sickle  "Cell Trait Mother      Sickle Cell Trait Father        Social History:  Social History     Socioeconomic History     Marital status: Single     Spouse name: Not on file     Number of children: Not on file     Years of education: Not on file     Highest education level: Not on file   Occupational History     Not on file   Social Needs     Financial resource strain: Not on file     Food insecurity     Worry: Not on file     Inability: Not on file     Transportation needs     Medical: Not on file     Non-medical: Not on file   Tobacco Use     Smoking status: Never Smoker     Smokeless tobacco: Never Used   Substance and Sexual Activity     Alcohol use: Not Currently     Alcohol/week: 0.0 standard drinks     Drug use: Never     Sexual activity: Not Currently     Partners: Male     Birth control/protection: Other   Lifestyle     Physical activity     Days per week: Not on file     Minutes per session: Not on file     Stress: Not on file   Relationships     Social connections     Talks on phone: Not on file     Gets together: Not on file     Attends [employer] service: Not on file     Active member of club or organization: Not on file     Attends meetings of clubs or organizations: Not on file     Relationship status: Not on file     Intimate partner violence     Fear of current or ex partner: Not on file     Emotionally abused: Not on file     Physically abused: Not on file     Forced sexual activity: Not on file   Other Topics Concern     Parent/sibling w/ CABG, MI or angioplasty before 65F 55M? Not Asked   Social History Narrative    Lives with mom and extended family but \"toxic environment\" per her report. She would like to move out but cannot financially do so. She has minimal support at home despite her significant SCD comorbidities and cognitive delay from stroke.       Medications:  Current Outpatient Medications   Medication     acetaminophen (TYLENOL) 325 MG tablet     albuterol (PROVENTIL) (2.5 MG/3ML) 0.083% " neb solution     aspirin (ASPIRIN) 81 MG EC tablet     Budesonide-Formoterol Fumarate (SYMBICORT IN)     celecoxib (CELEBREX) 100 MG capsule     diphenhydrAMINE (BENADRYL) 25 MG capsule     GNP SENNA-LAX 8.6 MG tablet     Hydroxyurea 1000 MG TABS     ibuprofen (ADVIL/MOTRIN) 200 MG tablet     JADENU 360 MG tablet     medroxyPROGESTERone (DEPO-PROVERA) 150 MG/ML IM injection     naloxone (NARCAN) 4 MG/0.1ML nasal spray     ondansetron (ZOFRAN) 8 MG tablet     oxyCODONE IR (ROXICODONE) 10 MG tablet     OXYCONTIN 10 MG 12 hr tablet     sertraline (ZOLOFT) 100 MG tablet     No current facility-administered medications for this visit.          Physical Exam:   No exam (phone visit)    No recent labs    Imaging: none today    Assessment:  Jennifer Cervantes is a 20 year old female with HbSS. Her disease has been complicated by stroke leading to significant cognitive delay and right UE hemiparesis, high anxiety leading to frequent pain crises on top of chronic pain syndrome, poor social structure at home with no real support, and iron overload secondary to repeated transfusions. She is doing better today than a few weeks ago and is optimistic about continued improvements. She does want to engage in PT for her right leg and is very happy with the progress on the OT side with her right arm    Plan:  1) HbSS and complications   -- Pain plan in chart. Not reviewed in this visit. reviewed and updated.    Heat packs, warm showers, Tylenol, ibuprofen. Refilled oxycodone and oxycontin and changed oxycodone to q6h PRN with instructions to call clinic if needing multiple doses              --She had been on q6 week transfusions (exchanged 4/7) Last transfusion early May. Had CVA ~1 yo and another TIA ~2014 during a trial off transfusions and just HU. My goal is to ultimately stop the transfusions given her significant iron overload but she has taken some time to become comfortable with this. She is ~3 months out from the last transfusion   (8/21) but had gone 3 months prior to that..   --Needs aggressive chelation. Jadenu now 1440 mg daily and well-tolerated per report. She is back on it and ferritin is dropping again. Audiology testing reassuring.   --Continue ASA post-stroke. PT referral placed to continue with post-stroke rehab on RLE  2) Labs: CBC, CMP, ferritin  3) Medication Changes: Oxycodone back to q6h PRN. Hydroxyurea recently changed to 1000 mg tabs. Will consider an increase to 3000 mg soon.  5)  HCM: PCP is Daya Carter. Need to get SW closely involved.   6)  Psych: 200 mg Zoloft   7)  Facial numbness/tingling: resolved  8) Next visit: 4 weeks in person    It was a pleasure talking to Jennifer today and in continuing to guide her care and transition to adult care moving forward.    I spent a total of 11 minutes on the phone with Jennifer during today's office visit. See note for details.        Eric Duncan MD  Hematologist  Division of Hematology, Oncology, and Transplantation  [facility]  [facility]  Pager: [phone]        Again, thank you for allowing me to participate in the care of your patient.        Sincerely,        Eric Duncan MD

## 2020-09-16 DIAGNOSIS — D57.1 HB-SS DISEASE WITHOUT CRISIS (H): ICD-10-CM

## 2020-09-16 DIAGNOSIS — D57.00 SICKLE CELL CRISIS (H): ICD-10-CM

## 2020-09-16 RX ORDER — OXYCODONE HYDROCHLORIDE 10 MG/1
TABLET ORAL
Qty: 60 TABLET | Refills: 0 | Status: SHIPPED | OUTPATIENT
Start: 2020-09-16 | End: 2020-10-01

## 2020-09-16 NOTE — TELEPHONE ENCOUNTER
Narcotic Refill     Medication(s) Requested: oxycodone 10mg    Last refilled date and by who: 9/1/2020 Dr Duncan     reviewed and noted:     What pain is the medication treating: SCD pain    How is the patient actually taking the medication (not just want the directions say): 1 tablet every 6 hours, has approx 2-3 tablets left    Is their pain being adequately controlled on the current regimen: Yes    Experiencing any side effects from medication (sedation, constipation, etc): No

## 2020-09-22 ENCOUNTER — PATIENT OUTREACH (OUTPATIENT)
Dept: ONCOLOGY | Facility: CLINIC | Age: 20
End: 2020-09-22

## 2020-09-22 NOTE — PROGRESS NOTES
Writer placed call to Jennifer to review need to contact medication management team regarding late refill of her Jadenu. No answer received, left detailed VM with number for her to contact to initiate refill.

## 2020-09-23 ENCOUNTER — TELEPHONE (OUTPATIENT)
Dept: PHYSICAL THERAPY | Facility: CLINIC | Age: 20
End: 2020-09-23

## 2020-09-23 DIAGNOSIS — Z86.73 HISTORY OF STROKE: Primary | ICD-10-CM

## 2020-09-30 ENCOUNTER — THERAPY VISIT (OUTPATIENT)
Dept: PHYSICAL THERAPY | Facility: CLINIC | Age: 20
End: 2020-09-30
Payer: COMMERCIAL

## 2020-09-30 DIAGNOSIS — Z86.73 HISTORY OF STROKE: ICD-10-CM

## 2020-10-01 ENCOUNTER — TELEPHONE (OUTPATIENT)
Dept: ONCOLOGY | Facility: CLINIC | Age: 20
End: 2020-10-01

## 2020-10-01 ENCOUNTER — OFFICE VISIT (OUTPATIENT)
Dept: ORTHOPEDICS | Facility: CLINIC | Age: 20
End: 2020-10-01
Payer: COMMERCIAL

## 2020-10-01 DIAGNOSIS — I69.852: Primary | ICD-10-CM

## 2020-10-01 DIAGNOSIS — D57.00 SICKLE CELL CRISIS (H): ICD-10-CM

## 2020-10-01 DIAGNOSIS — D57.1 HB-SS DISEASE WITHOUT CRISIS (H): ICD-10-CM

## 2020-10-01 DIAGNOSIS — M24.531 CONTRACTURE OF RIGHT WRIST: ICD-10-CM

## 2020-10-01 DIAGNOSIS — M21.949: ICD-10-CM

## 2020-10-01 PROCEDURE — 99213 OFFICE O/P EST LOW 20 MIN: CPT | Performed by: ORTHOPAEDIC SURGERY

## 2020-10-01 RX ORDER — OXYCODONE HYDROCHLORIDE 10 MG/1
10 TABLET, FILM COATED, EXTENDED RELEASE ORAL EVERY 12 HOURS
Qty: 60 TABLET | Refills: 0 | Status: SHIPPED | OUTPATIENT
Start: 2020-10-01 | End: 2020-11-02

## 2020-10-01 RX ORDER — OXYCODONE HYDROCHLORIDE 10 MG/1
TABLET ORAL
Qty: 60 TABLET | Refills: 0 | Status: SHIPPED | OUTPATIENT
Start: 2020-10-01 | End: 2020-10-14

## 2020-10-01 NOTE — NURSING NOTE
Reason For Visit:   Chief Complaint   Patient presents with     RECHECK     Right Elbow Flexor Lengthening, Flexor Pronator Slide Of Wrist and Finger, Thumb Adductor Lengthening, DOS: 10/2/19.       Pain Assessment  Patient Currently in Pain: Denies        Allergies   Allergen Reactions     Fish-Derived Products Hives     Seafood Hives     Contrast Dye      Diagnostic X-Ray Materials      Casey Woodall LPN

## 2020-10-01 NOTE — TELEPHONE ENCOUNTER
Pt called in to triage requesting oxycodone IR 10 mg and oxycontin 10 mg. State she will take her last oxycodone at 1130 and has already run out of her oxycontin. Has been taking oxycodone every 6 hours around the clock and state this is managing her pain well, she does not need IVF pain meds or have any other concerns at this time. Requesting refill to [facility] on file. Informed her will send request to team and someone will call her once sent, she verbalized understanding.    Oxycodone IR last filled 9/16 for #60 tablets  Oxycontin last filled 8/17 for #60 tablets (30 day supply). Unable to check  as not a delegate under this provider.

## 2020-10-01 NOTE — LETTER
10/1/2020         RE: Jennifer Cervantes  [address]  [facility] [address]        Dear Colleague,    Thank you for referring your patient, Jennifer Cervantes, to the [facility] [place]. Please see a copy of my visit note below.    Progress Note    Name: Jennifer Cervantes  MRN: [id number]  Age: 19 year old  : 2000  Referring provider: Referred Self     Chief Complaint: RECHECK after surgical treatment with Geena right elbow flexor pronator plasty, brachialis lengthening, biceps tenotomy, and thenar release in on 2018.     History of Present Illness:   Jennifer Cervantes is a 19 year old female with a history of sickle cell and stroke when she was 1-2 years old who presents today for follow-up of right upper extremity weakness.  She has continued to have significant problems with her sickle cell.  She has had multiple admissions.  I have not seen her since briefly after the surgery..      Review of Systems:   A 10-point review of systems was obtained and is negative except for as noted in the HPI.      Physical Examination:  There were no vitals taken for this visit.       Right passive range of motion prior to the surgery:  Shoulder flexion: 120 degrees   Shoulder abduction: 120 degrees   Shoulder external rotation: 90 degrees   Shoulder internal rotation: 60 degrees   Elbow Flexion: 140 degrees   Elbow Extension: -65 degrees   Forearm pronation: 90 degrees   Supination: 45 degrees   Wrist extension: -40 degrees       Shoulder internal rotation: 60 degrees   Elbow Flexion: 140 degrees   Elbow Extension:- 45 degrees   Forearm pronation: 90 degrees   Supination: 45 degrees   Wrist extension: -60 degrees with the fingers extended and -30 degrees with the fingers flexed.  Previously this had been -20 degrees after the surgery.        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    Status post surgical releases now with mild recurrent contractures.     Plan:   Because of her multiple medical problems, she has not been able to be involved in a therapy program.  I would like to have her see our therapist for wrist hand orthosis.  The primary recurrence has been at her wrist.  Her elbow has remained stable.  She remains pleased with her result.  Therapy orders were written.  I will follow her every 6 months, sooner if questions or problems arise.     Anai Franco MD   Hand and Upper Extremity Specialist  [facility]

## 2020-10-02 ENCOUNTER — TELEPHONE (OUTPATIENT)
Dept: ONCOLOGY | Facility: CLINIC | Age: 20
End: 2020-10-02

## 2020-10-02 NOTE — TELEPHONE ENCOUNTER
Pt called in to triage requesting IVF pain meds for bilateral arms and back pain rated 9.5/10. Pains radiate down the arms, does not feel weak or have any swelling, redness or warmth. Stated for past 2-3 days but did run out of both oxy IR and oxycontin yesterday. Called for a refill and chart shows Dr. Duncan signed at 2:30 pm but pt stated she called [facility] at 3 pm and they told her they didn't have it. Pt has been taking tylenol and ibuprofen with just mild relief.    Denied any fevers, chills, cough, sob, wheezing, cp. Advised pt to call [facility] again this morning as chart does show it went through successfully, call back to inform us if they still say they didn't get the scripts, at this time Pt informed she is on the wait list for cancellations if she wants to treat at home and wait for a call later if something opens up or if she chooses to go to the ED to call us back, she verbalized understanding.

## 2020-10-02 NOTE — TELEPHONE ENCOUNTER
Next follow-up with Dr. Duncan 10/23, last visit 9/11 and narcotics just filled yesterday. Pt qualifies for sickle cell standing protocol.

## 2020-10-05 ENCOUNTER — TELEPHONE (OUTPATIENT)
Dept: ONCOLOGY | Facility: CLINIC | Age: 20
End: 2020-10-05

## 2020-10-05 NOTE — TELEPHONE ENCOUNTER
Per Dr. Duncan: ok for IVF pain meds today in clinic, labs prior per therapy plan.     Pt informed she is on the wait list for cancellations if she wants to treat at home and wait for a call later if something opens up or if she chooses to go to the ED to call us back, she verbalized understanding.

## 2020-10-05 NOTE — TELEPHONE ENCOUNTER
"Pt called in to triage requesting IVF pain meds for back and bilateral side pain rated 9.5/10 for past 3 days. Stated she is taking Oxy IR 10mg with ibuprofen and tylenol every 6 hours, oxycontin every 12 hours without much relief. Her plan allows her to take the Oxy IR every 4 hours but she did not want to \"throw off my schedule\". Pt denied any fevers, chills, sob, cough, wheezing or chest pain. Paged Dr. Duncan as she was approved for IVF pain meds on 10/2 but there was no availability that day, and she has not tried her oral meds at every 4 hours as instructed.  "

## 2020-10-07 ENCOUNTER — TELEPHONE (OUTPATIENT)
Dept: ONCOLOGY | Facility: CLINIC | Age: 20
End: 2020-10-07

## 2020-10-07 ENCOUNTER — PATIENT OUTREACH (OUTPATIENT)
Dept: ONCOLOGY | Facility: CLINIC | Age: 20
End: 2020-10-07

## 2020-10-07 NOTE — TELEPHONE ENCOUNTER
"Pt called in to triage stating she had called multiple times now requesting an IVF pain med appointment for pain and not been able to get in. (called 10/2 and 10/5 these days were unable to get in). Stated \"I've been very patient but am now getting angry\".     Informed pt at this time there is a wait list to see if there's any cancellations in infusion and availability for later today. If she was interested in being on this list would take her information and call her back as openings came up. She hung up on writer without stating what current symptoms were. RNCC and team informed.  "

## 2020-10-07 NOTE — PROGRESS NOTES
Writer placed call to Jennifer to see how her pain is doing and update her on what we want to do for her in the immediate future. Writer advised that she has been doing her part to stay out of both the hospital and the ATC by utilizing home rescue medications and alternatives and this has been recognized by her team. She has been unable to get in to infusion on 10/2, 10/5, and again today. Writer routed previous call back to triage pool so that the opener RN can call first thing and assess her level of pain and if she needs to come in for IVF/pain meds. Jennifer voiced understanding of this and writer assured her that her increase in frequency of her pain meds has been documented as a recommendation by Dr Duncan as she raised concerns that people might  her. She will let writer know if this concern continues further and provide examples of situations and people if it were to happen. Jennifer voiced understanding of plan and will be available to answer her phone, writer made it known that it will likely be around 7:00 - 7:15 in the morning.

## 2020-10-07 NOTE — TELEPHONE ENCOUNTER
Called pt back to inform her per Dr. Duncan: recommend she go to ED for pain if unable to manage at home with oral pain meds since she's been calling with increased pain since 10/2.     Pt state today pain is mainly in her back rated 8/10. She took Oxy IR 10 mg at 1030, every 6 hours does not want to take more frequently and run out. She took  Ibuprofen and tylenol this morning along with regularly scheduled celebrex and oxycontin.    Denied any fevers, chills, cough, sob or chest pain. She stated at this time she will wait to see if she can get into infusion today at the clinic. Advised her to take her Oxy every 4 hours instead of 6, informed her it is better to get her pain under control then have increased levels for several days we can always refill her meds if she is running low. She verbalized understanding and is on wait list today for infusion.

## 2020-10-08 ENCOUNTER — INFUSION THERAPY VISIT (OUTPATIENT)
Dept: ONCOLOGY | Facility: CLINIC | Age: 20
End: 2020-10-08
Attending: PEDIATRICS
Payer: COMMERCIAL

## 2020-10-08 ENCOUNTER — TELEPHONE (OUTPATIENT)
Dept: ONCOLOGY | Facility: CLINIC | Age: 20
End: 2020-10-08

## 2020-10-08 ENCOUNTER — PATIENT OUTREACH (OUTPATIENT)
Dept: ONCOLOGY | Facility: CLINIC | Age: 20
End: 2020-10-08

## 2020-10-08 ENCOUNTER — APPOINTMENT (OUTPATIENT)
Dept: LAB | Facility: CLINIC | Age: 20
End: 2020-10-08
Attending: PEDIATRICS
Payer: COMMERCIAL

## 2020-10-08 VITALS
OXYGEN SATURATION: 96 % | HEART RATE: 113 BPM | DIASTOLIC BLOOD PRESSURE: 74 MMHG | TEMPERATURE: 97.8 F | SYSTOLIC BLOOD PRESSURE: 129 MMHG | WEIGHT: 150.7 LBS | RESPIRATION RATE: 16 BRPM | BODY MASS INDEX: 25.87 KG/M2

## 2020-10-08 DIAGNOSIS — D57.00 SICKLE CELL PAIN CRISIS (H): Primary | ICD-10-CM

## 2020-10-08 DIAGNOSIS — D57.1 HB-SS DISEASE WITHOUT CRISIS (H): ICD-10-CM

## 2020-10-08 DIAGNOSIS — E83.111 HEMOCHROMATOSIS DUE TO REPEATED RED BLOOD CELL TRANSFUSIONS: ICD-10-CM

## 2020-10-08 LAB
ALBUMIN SERPL-MCNC: 4 G/DL (ref 3.4–5)
ALP SERPL-CCNC: 70 U/L (ref 40–150)
ALT SERPL W P-5'-P-CCNC: 99 U/L (ref 0–50)
ANION GAP SERPL CALCULATED.3IONS-SCNC: 7 MMOL/L (ref 3–14)
ANISOCYTOSIS BLD QL SMEAR: ABNORMAL
AST SERPL W P-5'-P-CCNC: 102 U/L (ref 0–45)
BASOPHILS # BLD AUTO: 0.5 10E9/L (ref 0–0.2)
BASOPHILS NFR BLD AUTO: 4.3 %
BILIRUB SERPL-MCNC: 3.9 MG/DL (ref 0.2–1.3)
BUN SERPL-MCNC: 11 MG/DL (ref 7–30)
CALCIUM SERPL-MCNC: 8.3 MG/DL (ref 8.5–10.1)
CHLORIDE SERPL-SCNC: 106 MMOL/L (ref 94–109)
CO2 SERPL-SCNC: 20 MMOL/L (ref 20–32)
CREAT SERPL-MCNC: 0.47 MG/DL (ref 0.52–1.04)
DIFFERENTIAL METHOD BLD: ABNORMAL
ELLIPTOCYTES BLD QL SMEAR: ABNORMAL
EOSINOPHIL # BLD AUTO: 0.5 10E9/L (ref 0–0.7)
EOSINOPHIL NFR BLD AUTO: 4.3 %
ERYTHROCYTE [DISTWIDTH] IN BLOOD BY AUTOMATED COUNT: 24.6 % (ref 10–15)
FERRITIN SERPL-MCNC: ABNORMAL NG/ML (ref 12–150)
GFR SERPL CREATININE-BSD FRML MDRD: >90 ML/MIN/{1.73_M2}
GLUCOSE SERPL-MCNC: 90 MG/DL (ref 70–99)
HCT VFR BLD AUTO: 21.4 % (ref 35–47)
HGB BLD-MCNC: 7.5 G/DL (ref 11.7–15.7)
LYMPHOCYTES # BLD AUTO: 3.1 10E9/L (ref 0.8–5.3)
LYMPHOCYTES NFR BLD AUTO: 24.3 %
MACROCYTES BLD QL SMEAR: PRESENT
MCH RBC QN AUTO: 32.1 PG (ref 26.5–33)
MCHC RBC AUTO-ENTMCNC: 35 G/DL (ref 31.5–36.5)
MCV RBC AUTO: 92 FL (ref 78–100)
MONOCYTES # BLD AUTO: 0.9 10E9/L (ref 0–1.3)
MONOCYTES NFR BLD AUTO: 7 %
MYELOCYTES # BLD: 0.2 10E9/L
MYELOCYTES NFR BLD MANUAL: 1.7 %
NEUTROPHILS # BLD AUTO: 7.4 10E9/L (ref 1.6–8.3)
NEUTROPHILS NFR BLD AUTO: 58.4 %
NRBC # BLD AUTO: 1.4 10*3/UL
NRBC BLD AUTO-RTO: 11 /100
PLATELET # BLD AUTO: 278 10E9/L (ref 150–450)
PLATELET # BLD EST: ABNORMAL 10*3/UL
POIKILOCYTOSIS BLD QL SMEAR: ABNORMAL
POLYCHROMASIA BLD QL SMEAR: ABNORMAL
POTASSIUM SERPL-SCNC: 3.7 MMOL/L (ref 3.4–5.3)
PROT SERPL-MCNC: 7.6 G/DL (ref 6.8–8.8)
RBC # BLD AUTO: 2.34 10E12/L (ref 3.8–5.2)
SODIUM SERPL-SCNC: 133 MMOL/L (ref 133–144)
TARGETS BLD QL SMEAR: ABNORMAL
WBC # BLD AUTO: 12.7 10E9/L (ref 4–11)

## 2020-10-08 PROCEDURE — 250N000011 HC RX IP 250 OP 636: Performed by: PEDIATRICS

## 2020-10-08 PROCEDURE — 96376 TX/PRO/DX INJ SAME DRUG ADON: CPT

## 2020-10-08 PROCEDURE — 96374 THER/PROPH/DIAG INJ IV PUSH: CPT

## 2020-10-08 PROCEDURE — 99207 PR NO BILLABLE SERVICE THIS VISIT: CPT

## 2020-10-08 PROCEDURE — 258N000003 HC RX IP 258 OP 636: Performed by: PHYSICIAN ASSISTANT

## 2020-10-08 PROCEDURE — 250N000011 HC RX IP 250 OP 636: Performed by: PHYSICIAN ASSISTANT

## 2020-10-08 PROCEDURE — 85025 COMPLETE CBC W/AUTO DIFF WBC: CPT | Performed by: PATHOLOGY

## 2020-10-08 PROCEDURE — 96361 HYDRATE IV INFUSION ADD-ON: CPT

## 2020-10-08 PROCEDURE — 82728 ASSAY OF FERRITIN: CPT | Performed by: PATHOLOGY

## 2020-10-08 PROCEDURE — 80053 COMPREHEN METABOLIC PANEL: CPT | Performed by: PATHOLOGY

## 2020-10-08 RX ORDER — MORPHINE SULFATE 2 MG/ML
2 INJECTION, SOLUTION INTRAMUSCULAR; INTRAVENOUS
Status: CANCELLED
Start: 2020-10-08

## 2020-10-08 RX ORDER — HEPARIN SODIUM (PORCINE) LOCK FLUSH IV SOLN 100 UNIT/ML 100 UNIT/ML
5 SOLUTION INTRAVENOUS
Status: CANCELLED | OUTPATIENT
Start: 2020-10-08

## 2020-10-08 RX ORDER — DIPHENHYDRAMINE HCL 25 MG
25 CAPSULE ORAL
Status: CANCELLED
Start: 2020-10-08

## 2020-10-08 RX ORDER — HEPARIN SODIUM,PORCINE 10 UNIT/ML
5 VIAL (ML) INTRAVENOUS
Status: CANCELLED | OUTPATIENT
Start: 2020-10-08

## 2020-10-08 RX ORDER — HEPARIN SODIUM (PORCINE) LOCK FLUSH IV SOLN 100 UNIT/ML 100 UNIT/ML
5 SOLUTION INTRAVENOUS
Status: DISCONTINUED | OUTPATIENT
Start: 2020-10-08 | End: 2020-10-08 | Stop reason: HOSPADM

## 2020-10-08 RX ORDER — HEPARIN SODIUM (PORCINE) LOCK FLUSH IV SOLN 100 UNIT/ML 100 UNIT/ML
5 SOLUTION INTRAVENOUS ONCE
Status: COMPLETED | OUTPATIENT
Start: 2020-10-08 | End: 2020-10-08

## 2020-10-08 RX ORDER — ONDANSETRON 8 MG/1
8 TABLET, FILM COATED ORAL
Status: CANCELLED
Start: 2020-10-08

## 2020-10-08 RX ORDER — MORPHINE SULFATE 2 MG/ML
2 INJECTION, SOLUTION INTRAMUSCULAR; INTRAVENOUS
Status: COMPLETED | OUTPATIENT
Start: 2020-10-08 | End: 2020-10-08

## 2020-10-08 RX ADMIN — MORPHINE SULFATE 2 MG: 2 INJECTION, SOLUTION INTRAMUSCULAR; INTRAVENOUS at 14:20

## 2020-10-08 RX ADMIN — Medication 5 ML: at 14:43

## 2020-10-08 RX ADMIN — Medication 5 ML: at 11:57

## 2020-10-08 RX ADMIN — MORPHINE SULFATE 2 MG: 2 INJECTION, SOLUTION INTRAMUSCULAR; INTRAVENOUS at 13:16

## 2020-10-08 RX ADMIN — DEXTROSE AND SODIUM CHLORIDE 500 ML: 5; 450 INJECTION, SOLUTION INTRAVENOUS at 12:10

## 2020-10-08 RX ADMIN — MORPHINE SULFATE 2 MG: 2 INJECTION, SOLUTION INTRAMUSCULAR; INTRAVENOUS at 12:12

## 2020-10-08 ASSESSMENT — PAIN SCALES - GENERAL: PAINLEVEL: WORST PAIN (10)

## 2020-10-08 NOTE — NURSING NOTE
Chief Complaint   Patient presents with     Infusion     IVF/pain medication     Port Draw     Labs drawn via port by RN in lab. VS taken. Pt checked in for next appt     Port accessed with 20g gripper needle by RN, labs collected, line flushed with saline and heparin.  Vitals taken. Pt checked in for appointment(s).    Shantell ARMIJO RN PHN BSN  BMT/Oncology Lab

## 2020-10-08 NOTE — PATIENT INSTRUCTIONS
Contact Numbers  [facility]: [phone]    After Hours:  [phone]  Triage: [phone]    Please call the [facility] Triage line if you experience a temperature greater than or equal to 100.5, shaking chills, have uncontrolled nausea, vomiting and/or diarrhea, dizziness, shortness of breath, chest pain, bleeding, unexplained bruising, or if you have any other new/concerning symptoms, questions or concerns.     If it is after hours, weekends, or holidays, please call the main hospital  at  [phone] and ask to speak to the Oncology doctor on call.     If you are having any concerning symptoms or wish to speak to a provider before your next infusion visit, please call your care coordinator or triage to notify them so we can adequately serve you.     If you need a refill on a narcotic prescription or other medication, please call triage before your infusion appointment.         October 2020 Sunday Monday Tuesday Wednesday Thursday Friday Saturday                       1    [facility] RETURN HAND  10:25 AM   (10 min.)   Anai Franco MD   [facility] Orthopedic [facility] 2     3       4     5     6     7     8    [facility] DRAW  11:15 AM   (15 min.)    [facility] DRAW   [facility] ONC INFUSION 120  12:00 PM   (120 min.)    ONCOLOGY INFUSION   [facility] 9     10       11     12     13     14     15     16     17       18     19     20     21    NEURO TREATMENT  10:00 AM   (45 min.)   Rony Delacruz PT   [facility] [place] [id number]    [facility] DRAW   1:15 PM   (15 min.)    [facility] DRAW   [facility]   1:45 PM   (30 min.)   Eric Duncan MD   [facility] 24       25     26     27     28    NEURO TREATMENT  10:00 AM   (45 min.)   Rony Delacruz, PT   [facility]  [facility] 29 30 31 November 2020 Sunday Monday Tuesday Wednesday Thursday Friday Saturday   1     2     3     4    NEURO TREATMENT  10:00 AM   (45 min.)   [facility] 5     6     7       8     9     10     11    NEURO TREATMENT  10:00 AM   (45 min.)   [facility] 12     13     14       15     16     17     18     19     20     21       22     23     24     25     26     27     28       29     30                                              Lab Results:  Recent Results (from the past 12 hour(s))   Ferritin    Collection Time: 10/08/20 12:01 PM   Result Value Ref Range    Ferritin 10,458 (H) 12 - 150 ng/mL   Comprehensive metabolic panel    Collection Time: 10/08/20 12:01 PM   Result Value Ref Range    Sodium 133 133 - 144 mmol/L    Potassium 3.7 3.4 - 5.3 mmol/L    Chloride 106 94 - 109 mmol/L    Carbon Dioxide 20 20 - 32 mmol/L    Anion Gap 7 3 - 14 mmol/L    Glucose 90 70 - 99 mg/dL    Urea Nitrogen 11 7 - 30 mg/dL    Creatinine 0.47 (L) 0.52 - 1.04 mg/dL    GFR Estimate >90 >60 mL/min/[1.73_m2]    GFR Estimate If Black >90 >60 mL/min/[1.73_m2]    Calcium 8.3 (L) 8.5 - 10.1 mg/dL    Bilirubin Total 3.9 (H) 0.2 - 1.3 mg/dL    Albumin 4.0 3.4 - 5.0 g/dL    Protein Total 7.6 6.8 - 8.8 g/dL    Alkaline Phosphatase 70 40 - 150 U/L    ALT 99 (H) 0 - 50 U/L     (H) 0 - 45 U/L   CBC with platelets differential    Collection Time: 10/08/20 12:01 PM   Result Value Ref Range    WBC 12.7 (H) 4.0 - 11.0 10e9/L    RBC Count 2.34 (L) 3.8 - 5.2 10e12/L    Hemoglobin 7.5 (L) 11.7 - 15.7 g/dL    Hematocrit 21.4 (L) 35.0 - 47.0 %    MCV 92 78 - 100 fl    MCH 32.1 26.5 - 33.0 pg    MCHC 35.0 31.5 - 36.5 g/dL    RDW 24.6 (H) 10.0 - 15.0 %    Platelet Count 278 150 - 450 10e9/L    Diff Method Manual Differential     % Neutrophils 58.4 %    % Lymphocytes 24.3 %    % Monocytes 7.0 %     % Eosinophils 4.3 %    % Basophils 4.3 %    % Myelocytes 1.7 %    Nucleated RBCs 11 (H) 0 /100    Absolute Neutrophil 7.4 1.6 - 8.3 10e9/L    Absolute Lymphocytes 3.1 0.8 - 5.3 10e9/L    Absolute Monocytes 0.9 0.0 - 1.3 10e9/L    Absolute Eosinophils 0.5 0.0 - 0.7 10e9/L    Absolute Basophils 0.5 (H) 0.0 - 0.2 10e9/L    Absolute Myelocytes 0.2 (H) 0 10e9/L    Absolute Nucleated RBC 1.4     Anisocytosis Moderate     Poikilocytosis Marked     Polychromasia Marked     Elliptocytes Marked     Target Cells Moderate     Macrocytes Present     Platelet Estimate Confirming automated cell count

## 2020-10-08 NOTE — PROGRESS NOTES
Writer received communication from Dr David Duncan that he had been paged regarding Jennifer's Hgb value of 7.5 given previous therapy plan for PRBC's if below 10. Writer advised that those orders were for other circumstances and that no interventions are needed today, per Dr Duncan. Jennifer is within her baseline range and per Valerie, she is not having any symptoms at this time.

## 2020-10-08 NOTE — PROGRESS NOTES
Infusion Nursing Note:  Jennifer Cervantes presents today for IVF/pain medication.    Patient seen by provider today: No   present during visit today: Not Applicable.    Note: Patient endorses constant back pain radiating to the left shoulder and hips. PS 10/10. States that its her usual sickle cell pain. Denies fever/chills. Denies shortness of breath, fatigue, chest and abdominal discomfort. Instruction given to patient as per provider. Verbalized understanding. No new concerns made. Otherwise well.    Patient had 3 doses of IV Morphine. Upon discharge PS 7/10, states that comfortable going home today. Patient states know when and where to call triage.     Intravenous Access:  Implanted Port.     Treatment Conditions:  Lab Results   Component Value Date    HGB 7.5 10/08/2020     Lab Results   Component Value Date    WBC 12.7 10/08/2020      Lab Results   Component Value Date    ANEU 7.4 10/08/2020     Lab Results   Component Value Date     10/08/2020      Lab Results   Component Value Date     10/08/2020                   Lab Results   Component Value Date    POTASSIUM 3.7 10/08/2020           No results found for: MAG         Lab Results   Component Value Date    CR 0.47 10/08/2020                   Lab Results   Component Value Date    MICAH 8.3 10/08/2020                Lab Results   Component Value Date    BILITOTAL 3.9 10/08/2020           Lab Results   Component Value Date    ALBUMIN 4.0 10/08/2020                    Lab Results   Component Value Date    ALT 99 10/08/2020           Lab Results   Component Value Date     10/08/2020       Results reviewed, labs MET treatment parameters, ok to proceed with treatment.    TORB: 10/8/20/Julieta Payne in behalf of Dr. Duncan/Dione Delarosa RN/ Hb 7.5,  No blood transfusion needed at this time.       Post Infusion Assessment:  Patient tolerated infusion without incident.  Blood return noted pre and post infusion.  Site patent and intact,  free from redness, edema or discomfort.  No evidence of extravasations.  Access discontinued per protocol.       Discharge Plan:   Patient declined prescription refills.  Discharge instructions reviewed with: Patient.  Patient and/or family verbalized understanding of discharge instructions and all questions answered.  AVS to patient via [facility].  Patient will return 10/23/20 for next appointment.   Patient discharged in stable condition accompanied by: self.  Departure Mode: Ambulatory.  Face to Face time: 5.    GLORIA YANCEY RN

## 2020-10-08 NOTE — TELEPHONE ENCOUNTER
Spoke with patient, scheduled for 1130 labs and 1200 [facility] infusion (okay per Melissa RN charge). Patient in agreement and denies further questions/concerns. High priority message sent to scheduling team.

## 2020-10-08 NOTE — PROGRESS NOTES
Progress Note    Name: Jennifer Cervantes  MRN: [id number]  Age: 19 year old  : 2000  Referring provider: Referred Self     Chief Complaint: RECHECK after surgical treatment with Geena right elbow flexor pronator plasty, brachialis lengthening, biceps tenotomy, and thenar release in on 2018.     History of Present Illness:   Jennifer Cervantes is a 19 year old female with a history of sickle cell and stroke when she was 1-2 years old who presents today for follow-up of right upper extremity weakness.  She has continued to have significant problems with her sickle cell.  She has had multiple admissions.  I have not seen her since briefly after the surgery..      Review of Systems:   A 10-point review of systems was obtained and is negative except for as noted in the HPI.      Physical Examination:  There were no vitals taken for this visit.       Right passive range of motion prior to the surgery:  Shoulder flexion: 120 degrees   Shoulder abduction: 120 degrees   Shoulder external rotation: 90 degrees   Shoulder internal rotation: 60 degrees   Elbow Flexion: 140 degrees   Elbow Extension: -65 degrees   Forearm pronation: 90 degrees   Supination: 45 degrees   Wrist extension: -40 degrees       Shoulder internal rotation: 60 degrees   Elbow Flexion: 140 degrees   Elbow Extension:- 45 degrees   Forearm pronation: 90 degrees   Supination: 45 degrees   Wrist extension: -60 degrees with the fingers extended and -30 degrees with the fingers flexed.  Previously this had been -20 degrees after the surgery.        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   Status post surgical releases now with mild recurrent contractures.     Plan:   Because of her multiple medical problems, she has not been able to be involved in a therapy program.  I would like to have her see our therapist for wrist hand orthosis.  The  primary recurrence has been at her wrist.  Her elbow has remained stable.  She remains pleased with her result.  Therapy orders were written.  I will follow her every 6 months, sooner if questions or problems arise.     Anai Franco MD   Hand and Upper Extremity Specialist  [facility]

## 2020-10-08 NOTE — TELEPHONE ENCOUNTER
Patient calls into clinic, she inquires about coming into infusion today for bilateral arm and back pain. Refer to telephone encounter yesterday. Informed patient her name is first on the list for infusion and we will contact her if/when an infusion appointment opens up. Advised ED if pain is severe, uncontrolled. Patient verbalizes understanding and denies further questions/concerns.

## 2020-10-09 ENCOUNTER — NURSE TRIAGE (OUTPATIENT)
Dept: NURSING | Facility: CLINIC | Age: 20
End: 2020-10-09

## 2020-10-09 ENCOUNTER — HOSPITAL ENCOUNTER (EMERGENCY)
Facility: CLINIC | Age: 20
Discharge: HOME | End: 2020-10-10
Attending: EMERGENCY MEDICINE | Admitting: EMERGENCY MEDICINE
Payer: COMMERCIAL

## 2020-10-09 DIAGNOSIS — D57.00 SICKLE CELL PAIN CRISIS (H): ICD-10-CM

## 2020-10-09 LAB
ALBUMIN SERPL-MCNC: 3.9 G/DL (ref 3.4–5)
ALP SERPL-CCNC: 69 U/L (ref 40–150)
ALT SERPL W P-5'-P-CCNC: 107 U/L (ref 0–50)
ANION GAP SERPL CALCULATED.3IONS-SCNC: 8 MMOL/L (ref 3–14)
ANISOCYTOSIS BLD QL SMEAR: ABNORMAL
AST SERPL W P-5'-P-CCNC: 114 U/L (ref 0–45)
BASOPHILS # BLD AUTO: 0.1 10E9/L (ref 0–0.2)
BASOPHILS NFR BLD AUTO: 0.9 %
BILIRUB SERPL-MCNC: 4 MG/DL (ref 0.2–1.3)
BUN SERPL-MCNC: 9 MG/DL (ref 7–30)
CALCIUM SERPL-MCNC: 7.9 MG/DL (ref 8.5–10.1)
CHLORIDE SERPL-SCNC: 110 MMOL/L (ref 94–109)
CO2 SERPL-SCNC: 24 MMOL/L (ref 20–32)
CREAT SERPL-MCNC: 0.58 MG/DL (ref 0.52–1.04)
DIFFERENTIAL METHOD BLD: ABNORMAL
EOSINOPHIL # BLD AUTO: 0.5 10E9/L (ref 0–0.7)
EOSINOPHIL NFR BLD AUTO: 4.3 %
ERYTHROCYTE [DISTWIDTH] IN BLOOD BY AUTOMATED COUNT: 26.6 % (ref 10–15)
GFR SERPL CREATININE-BSD FRML MDRD: >90 ML/MIN/{1.73_M2}
GLUCOSE SERPL-MCNC: 99 MG/DL (ref 70–99)
HCG SERPL QL: NEGATIVE
HCT VFR BLD AUTO: 20.5 % (ref 35–47)
HGB BLD-MCNC: 7.2 G/DL (ref 11.7–15.7)
LYMPHOCYTES # BLD AUTO: 2.7 10E9/L (ref 0.8–5.3)
LYMPHOCYTES NFR BLD AUTO: 21.7 %
MACROCYTES BLD QL SMEAR: PRESENT
MCH RBC QN AUTO: 32.3 PG (ref 26.5–33)
MCHC RBC AUTO-ENTMCNC: 35.1 G/DL (ref 31.5–36.5)
MCV RBC AUTO: 92 FL (ref 78–100)
MICROCYTES BLD QL SMEAR: PRESENT
MONOCYTES # BLD AUTO: 0.4 10E9/L (ref 0–1.3)
MONOCYTES NFR BLD AUTO: 3.5 %
MYELOCYTES # BLD: 0.1 10E9/L
MYELOCYTES NFR BLD MANUAL: 0.9 %
NEUTROPHILS # BLD AUTO: 8.6 10E9/L (ref 1.6–8.3)
NEUTROPHILS NFR BLD AUTO: 68.7 %
NRBC # BLD AUTO: 2.8 10*3/UL
NRBC BLD AUTO-RTO: 23 /100
PLATELET # BLD AUTO: 292 10E9/L (ref 150–450)
PLATELET # BLD EST: ABNORMAL 10*3/UL
POIKILOCYTOSIS BLD QL SMEAR: ABNORMAL
POLYCHROMASIA BLD QL SMEAR: ABNORMAL
POTASSIUM SERPL-SCNC: 3.4 MMOL/L (ref 3.4–5.3)
PROT SERPL-MCNC: 7.2 G/DL (ref 6.8–8.8)
RBC # BLD AUTO: 2.23 10E12/L (ref 3.8–5.2)
RETICS # AUTO: 759.1 10E9/L (ref 25–95)
RETICS/RBC NFR AUTO: 34 % (ref 0.5–2)
SICKLE CELLS BLD QL SMEAR: ABNORMAL
SODIUM SERPL-SCNC: 142 MMOL/L (ref 133–144)
WBC # BLD AUTO: 12.5 10E9/L (ref 4–11)

## 2020-10-09 PROCEDURE — 96374 THER/PROPH/DIAG INJ IV PUSH: CPT | Performed by: EMERGENCY MEDICINE

## 2020-10-09 PROCEDURE — 80053 COMPREHEN METABOLIC PANEL: CPT | Performed by: EMERGENCY MEDICINE

## 2020-10-09 PROCEDURE — 85045 AUTOMATED RETICULOCYTE COUNT: CPT | Performed by: EMERGENCY MEDICINE

## 2020-10-09 PROCEDURE — 96375 TX/PRO/DX INJ NEW DRUG ADDON: CPT | Performed by: EMERGENCY MEDICINE

## 2020-10-09 PROCEDURE — 250N000011 HC RX IP 250 OP 636: Performed by: EMERGENCY MEDICINE

## 2020-10-09 PROCEDURE — 258N000003 HC RX IP 258 OP 636: Performed by: EMERGENCY MEDICINE

## 2020-10-09 PROCEDURE — 99285 EMERGENCY DEPT VISIT HI MDM: CPT | Performed by: EMERGENCY MEDICINE

## 2020-10-09 PROCEDURE — 99285 EMERGENCY DEPT VISIT HI MDM: CPT | Mod: 25 | Performed by: EMERGENCY MEDICINE

## 2020-10-09 PROCEDURE — 96361 HYDRATE IV INFUSION ADD-ON: CPT | Performed by: EMERGENCY MEDICINE

## 2020-10-09 PROCEDURE — 85025 COMPLETE CBC W/AUTO DIFF WBC: CPT | Performed by: EMERGENCY MEDICINE

## 2020-10-09 PROCEDURE — 84703 CHORIONIC GONADOTROPIN ASSAY: CPT | Performed by: EMERGENCY MEDICINE

## 2020-10-09 RX ORDER — MORPHINE SULFATE 2 MG/ML
2 INJECTION, SOLUTION INTRAMUSCULAR; INTRAVENOUS
Status: DISCONTINUED | OUTPATIENT
Start: 2020-10-09 | End: 2020-10-10 | Stop reason: HOSPADM

## 2020-10-09 RX ORDER — KETOROLAC TROMETHAMINE 15 MG/ML
15 INJECTION, SOLUTION INTRAMUSCULAR; INTRAVENOUS ONCE
Status: COMPLETED | OUTPATIENT
Start: 2020-10-09 | End: 2020-10-09

## 2020-10-09 RX ADMIN — MORPHINE SULFATE 2 MG: 2 INJECTION, SOLUTION INTRAMUSCULAR; INTRAVENOUS at 22:16

## 2020-10-09 RX ADMIN — KETOROLAC TROMETHAMINE 15 MG: 15 INJECTION, SOLUTION INTRAMUSCULAR; INTRAVENOUS at 22:16

## 2020-10-09 RX ADMIN — SODIUM CHLORIDE, POTASSIUM CHLORIDE, SODIUM LACTATE AND CALCIUM CHLORIDE 1000 ML: 600; 310; 30; 20 INJECTION, SOLUTION INTRAVENOUS at 22:16

## 2020-10-09 NOTE — ED AVS SNAPSHOT
[facility] Emergency Department  [address] [address]  Phone: [phone]                                    Jennifer Cervantes   MRN: [id number]    Department: [facility] Emergency Department   Date of Visit: 10/9/2020           After Visit Summary Signature Page    I have received my discharge instructions, and my questions have been answered. I have discussed any challenges I see with this plan with the nurse or doctor.    ..........................................................................................................................................  Patient/Patient Representative Signature      ..........................................................................................................................................  Patient Representative Print Name and Relationship to Patient    ..................................................               ................................................  Date                                   Time    ..........................................................................................................................................  Reviewed by Signature/Title    ...................................................              ..............................................  Date                                               Time          22EPIC Rev 08/18

## 2020-10-10 VITALS
OXYGEN SATURATION: 96 % | RESPIRATION RATE: 16 BRPM | TEMPERATURE: 98.6 F | HEART RATE: 98 BPM | SYSTOLIC BLOOD PRESSURE: 133 MMHG | DIASTOLIC BLOOD PRESSURE: 76 MMHG

## 2020-10-10 PROCEDURE — 96376 TX/PRO/DX INJ SAME DRUG ADON: CPT | Performed by: EMERGENCY MEDICINE

## 2020-10-10 PROCEDURE — 250N000011 HC RX IP 250 OP 636: Performed by: EMERGENCY MEDICINE

## 2020-10-10 RX ORDER — HEPARIN SODIUM (PORCINE) LOCK FLUSH IV SOLN 100 UNIT/ML 100 UNIT/ML
5 SOLUTION INTRAVENOUS
Status: DISCONTINUED | OUTPATIENT
Start: 2020-10-10 | End: 2020-10-10 | Stop reason: HOSPADM

## 2020-10-10 RX ADMIN — MORPHINE SULFATE 2 MG: 2 INJECTION, SOLUTION INTRAMUSCULAR; INTRAVENOUS at 00:00

## 2020-10-10 RX ADMIN — Medication 5 ML: at 01:06

## 2020-10-10 NOTE — ED PROVIDER NOTES
[place] EMERGENCY DEPARTMENT ([facility])  October 9, 2020      ED 29 9:59 PM    History     Chief Complaint   Patient presents with     Sickle Cell Pain Crisis     The history is provided by the patient and medical records.     Jennifer Cervantes is a 20 year old female with prior history of sickle cell disease complicated by cerebral infarct who presents with sickle cell pain flare. Patient states that her sickle cell disease has been flaring over the past week. She went to infusion center yesterday but continued to have pain. She has been taking OxyContin and oxycodone for pain at home but no pain control with this. She was told that if she was taking pain medications every 4 hour and not controlling her pain to come in for assistance. Psychiatric was closed so she came in to the Emergency Department. She has pain all over body, focal over her back, similar to prior sickle cell pain flares. She typically gets flares with seasonal changes. Sometimes her sickle cell disease is stable for months, but sometimes flares occur every few weeks. No fevers, cough, cold, urinary symptoms, diarrhea, abdominal pain. Sometimes she is treated with Toradol and Tylenol, sometimes not. Usually is given morphine for pain.     PAST MEDICAL HISTORY:   Past Medical History:   Diagnosis Date     Anemia      Anxiety      Bleeding disorder (H)      Blood clotting disorder (H)      Cerebral infarction (H) 2015     Cognitive developmental delay     low IQ. 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 (H)     right hand contractures     Iron overload due to repeated red blood cell transfusions      Migraines      Oppositional defiant behavior      Uncomplicated asthma        PAST SURGICAL HISTORY:   Past Surgical History:   Procedure Laterality Date     AS INSERT TUNNELED CV 2 CATH W/O PORT/PUMP       C BREAST  REDUCTION (INCLUDES LIPO) TIER 3 Bilateral 04/23/2019     IR CVC NON TUNNEL PLACEMENT  4/7/2020     REPAIR TENDON ELBOW Right 10/2/2019    Procedure: Right Elbow Flexor Lengthening, Flexor Pronator Slide Of Wrist and Finger, Thumb Adductor Lengthening;  Surgeon: Anai Franco MD;  Location: [facility] OR     TONSILLECTOMY Bilateral 10/2/2019    Procedure: Bilateral Tonsillectomy;  Surgeon: Farhana Guy MD;  Location: [facility] OR       Past medical history, past surgical history, medications, and allergies were reviewed with the patient. Additional pertinent items: None    FAMILY HISTORY:   Family History   Problem Relation Age of Onset     Sickle Cell Trait Mother      Sickle Cell Trait Father        SOCIAL HISTORY:   Social History     Tobacco Use     Smoking status: Never Smoker     Smokeless tobacco: Never Used   Substance Use Topics     Alcohol use: Not Currently     Alcohol/week: 0.0 standard drinks     Social history was reviewed with the patient. Additional pertinent items: None      Discharge Medication List as of 10/10/2020 12:56 AM      CONTINUE these medications which have NOT CHANGED    Details   acetaminophen (TYLENOL) 325 MG tablet Take 2 tablets (650 mg) by mouth every 6 hours as needed for mild pain, Historical      albuterol (PROVENTIL) (2.5 MG/3ML) 0.083% neb solution Take 1 vial (2.5 mg) by nebulization every 6 hours as needed for shortness of breath / dyspnea or wheezing, Disp-12 mL,R-4, E-Prescribe      aspirin (ASPIRIN) 81 MG EC tablet Take 1 tablet (81 mg) by mouth daily, Disp-30 tablet,R-4, E-Prescribe      Budesonide-Formoterol Fumarate (SYMBICORT IN) Inhale  into the lungs., Inhalation, Until Discontinued, Historical      celecoxib (CELEBREX) 100 MG capsule Take 1 capsule (100 mg) by mouth 2 times daily, Disp-60 capsule,R-3, E-Prescribe      diphenhydrAMINE (BENADRYL) 25 MG capsule Take 1-2 capsules (25-50 mg) by mouth nightly as needed for sleep, Disp-60 capsule,R-3, E-Prescribe       GNP SENNA-LAX 8.6 MG tablet Take 1 tablet by mouth 2 times daily as needed for constipation, SHIELA, Historical      Hydroxyurea 1000 MG TABS Take 2,000 mg by mouth daily, Disp-60 tablet,R-3, E-Prescribe      ibuprofen (ADVIL/MOTRIN) 200 MG tablet Take 3 tablets (600 mg) by mouth every 6 hours as needed for moderate pain, Disp-90 tablet,R-3, E-Prescribe      JADENU 360 MG tablet Take 4 tablets (1,440 mg) by mouth daily, Disp-120 tablet,R-4,SHIELA, E-PrescribeCorrected dose to 1440mg daily      medroxyPROGESTERone (DEPO-PROVERA) 150 MG/ML IM injection Inject 150 mg into the muscle, Historical      naloxone (NARCAN) 4 MG/0.1ML nasal spray Spray 1 spray (4 mg) into one nostril alternating nostrils once as needed for opioid reversal every 2-3 minutes until assistance arrives, Disp-0.2 mL,R-1, E-Prescribe      ondansetron (ZOFRAN) 8 MG tablet Historical      oxyCODONE IR (ROXICODONE) 10 MG tablet Take 1 tablet (10mg) by mouth every 6 hours for pain. If, after 2 doses it is not working, try a dose at 4 hours and call clinic., Disp-60 tablet, R-0, E-Prescribe      OXYCONTIN 10 MG 12 hr tablet Take 1 tablet (10 mg) by mouth every 12 hours, Disp-60 tablet, R-0, SHIELA, E-Prescribe      sertraline (ZOLOFT) 100 MG tablet Take 2 tablets (200 mg) by mouth daily, Disp-60 tablet,R-3, E-Prescribe                Allergies   Allergen Reactions     Fish-Derived Products Hives     Seafood Hives     Contrast Dye      Diagnostic X-Ray Materials      Gadolinium         Review of Systems  A complete review of systems was performed with pertinent positives and negatives noted in the HPI, and all other systems negative.    Physical Exam   BP: (!) 141/94  Pulse: 98  Temp: 98.6  F (37  C)  Resp: 16  SpO2: (!) 87 %      Physical Exam  Vitals signs and nursing note reviewed.   Constitutional:       General: She is not in acute distress.     Appearance: She is well-developed. She is not diaphoretic.      Comments: Comfortably resting, lying in bed,  NAD, nondiaphoretic, lucid, fully conversant, no  respiratory distress, alert and oriented.     HENT:      Head: Normocephalic and atraumatic.   Eyes:      General: No scleral icterus.     Extraocular Movements: Extraocular movements intact.      Pupils: Pupils are equal, round, and reactive to light.   Neck:      Musculoskeletal: Normal range of motion and neck supple.   Cardiovascular:      Rate and Rhythm: Normal rate.   Pulmonary:      Effort: Pulmonary effort is normal. No respiratory distress.   Abdominal:      Palpations: Abdomen is soft.      Tenderness: There is no abdominal tenderness.   Musculoskeletal:      Right lower leg: No edema.      Left lower leg: No edema.   Skin:     General: Skin is warm and dry.      Coloration: Skin is not pale.      Findings: No erythema or rash.   Neurological:      Mental Status: She is alert and oriented to person, place, and time.         ED Course        Procedures                           No results found for this or any previous visit (from the past 24 hour(s)).  Medications   lactated ringers BOLUS 1,000 mL (0 mLs Intravenous Stopped 10/9/20 2330)   ketorolac (TORADOL) injection 15 mg (15 mg Intravenous Given 10/9/20 2216)             Assessments & Plan (with Medical Decision Making)   This is a 20-year-old female patient with a history of sickle cell is presenting to the emergency room with a sickle cell pain crisis that is typical for her normal crisis.  Here she denies any symptoms of acute chest.  Her vital signs are otherwise stable.  She denies any fevers.  She states that her classic symptoms are some upper back discomfort.  She has been using her medications at home with no significant improvement.  Here in the emergency room she is vitally stable.  She does not appear to be in any significant distress.  She was provided with IV fluids, Toradol and morphine with significant provement of all of her symptoms.  At this time I believe she can be safely  discharged.  Reviewing her labs they all appear mostly at their baseline.  There is no indications for transfusion or any other further work-up at this time.  Pt was discharged home/self-care.  PT was provided written discharge instructions. Additionally verbal instructions were given and discussed with patient.  PT was asked to return to the ED immediately for any new or concerning symptoms.  Pt was in agreement, endorsed understanding, and questions were answered.     I have reviewed the nursing notes.    I have reviewed the findings, diagnosis, plan and need for follow up with the patient.    Discharge Medication List as of 10/10/2020 12:56 AM          Final diagnoses:   Sickle cell pain crisis (H)   IBambi, am serving as a trained medical scribe to document services personally performed by Reddy Contreras MD based on the provider's statements to me on October 9, 2020.  This document has been checked and approved by the attending provider.    IReddy MD, was physically present and have reviewed and verified the accuracy of this note documented by Bambi Tejeda medical scribe.       10/9/2020   [facility] EMERGENCY DEPARTMENT     Reddy Contreras MD  10/11/20 0038

## 2020-10-10 NOTE — TELEPHONE ENCOUNTER
Jennifer calls and says that she has sickle cell pain and pain = 9/10. Pt. Says that her pain is uncontrollable. Pt. Says that she will have someone take her to the [facility] ER now. COVID 19 Nurse Triage Plan/Patient Instructions    Please be aware that novel coronavirus (COVID-19) may be circulating in the community. If you develop symptoms such as fever, cough, or SOB or if you have concerns about the presence of another infection including coronavirus (COVID-19), please contact your health care provider or visit [email or web address].     Disposition/Instructions    ED Visit recommended. Follow protocol based instructions.     Bring Your Own Device:  Please also bring your smart device(s) (smart phones, tablets, laptops) and their charging cables for your personal use and to communicate with your care team during your visit.    Thank you for taking steps to prevent the spread of this virus.  o Limit your contact with others.  o Wear a simple mask to cover your cough.  o Wash your hands well and often.    Resources    [facility]: About COVID-19: [email or web address]    CDC: What to Do If You're Sick: www.cdc.gov/coronavirus/2019-ncov/about/steps-when-sick.html    CDC: Ending Home Isolation: www.cdc.gov/coronavirus/2019-ncov/hcp/disposition-in-home-patients.html     CDC: Caring for Someone: www.cdc.gov/coronavirus/2019-ncov/if-you-are-sick/care-for-someone.html     [facility]: Interim Guidance for Hospital Discharge to Home: www.[email or web address]/coronavirus/hcp/hospdischarge.pdf    [facility] clinical trials (COVID-19 research studies): [email or web address]     Below are the COVID-19 hotlines at the [place] [facility] ([facility]). Interpreters are available.   o For health questions: Call 513-770-0545 or 1-956.746.6967 (7 a.m. to 7 p.m.)  o For questions about schools and childcare: Call 082-532-1339 or 1-741.474.8536 (7 a.m. to 7 p.m.)                      Reason for Disposition    Patient sounds very sick or weak to the triager    Additional Information    Negative: Passed out (i.e., lost consciousness, collapsed and was not responding)    Negative: Shock suspected (e.g., cold/pale/clammy skin, too weak to stand, low BP, rapid pulse)    Negative: Sounds like a life-threatening emergency to the triager    Negative: Major injury to the back (e.g., MVA, fall > 10 feet or 3 meters, penetrating injury, etc.)    Negative: Followed a tailbone injury    Negative: [1] Pain in the upper back over the ribs (rib cage) AND [2] radiates (travels, goes) into chest    Negative: [1] Pain in the upper back over the ribs (rib cage) AND [2] worsened by coughing (or clearly increases with breathing)    Negative: Back pain during pregnancy    Negative: Pain mainly in flank (i.e., in the side, over the lower ribs or just below the ribs)    Negative: [1] SEVERE back pain (e.g., excruciating) AND [2] sudden onset AND [3] age > 60    Negative: [1] Unable to urinate (or only a few drops) > 4 hours AND [2] bladder feels very full (e.g., palpable bladder or strong urge to urinate)    Negative: [1] Loss of bladder or bowel control (urine or bowel incontinence; wetting self, leaking stool) AND [2] new onset    Negative: Numbness in groin or rectal area (i.e., loss of sensation)    Negative: [1] SEVERE abdominal pain AND [2] present > 1 hour    Negative: [1] Abdominal pain AND [2] age > 60    Negative: Weakness of a leg or foot (e.g., unable to bear weight, dragging foot)    Negative: Unable to walk    Protocols used: BACK PAIN-A-AH

## 2020-10-13 ENCOUNTER — PATIENT OUTREACH (OUTPATIENT)
Dept: ONCOLOGY | Facility: CLINIC | Age: 20
End: 2020-10-13

## 2020-10-13 NOTE — PROGRESS NOTES
"Jennifer Cervantes is a 20 year old female who is being evaluated via a billable telephone visit.      The patient has been notified of following:     \"This telephone visit will be conducted via a call between you and your physician/provider. We have found that certain health care needs can be provided without the need for a physical exam.  This service lets us provide the care you need with a short phone conversation.  If a prescription is necessary we can send it directly to your pharmacy.  If lab work is needed we can place an order for that and you can then stop by our lab to have the test done at a later time.    Telephone visits are billed at different rates depending on your insurance coverage. During this emergency period, for some insurers they may be billed the same as an in-person visit.  Please reach out to your insurance provider with any questions.    If during the course of the call the physician/provider feels a telephone visit is not appropriate, you will not be charged for this service.\"    Patient has given verbal consent for Telephone visit?  Yes    What phone number would you like to be contacted at? [phone]    How would you like to obtain your AVS? Mail a copy    Patient had no vitals to report.   9/10 on pain scale.     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    She was added on for acute/subacute VOC crisis. She called on 10/2 and 10/5 and was not able to get in for infusion either day. 10/7 she called again and was unable to get in. She was told to increase oxycodone every 4 hours. She was able to get in on 10/8 and went to ED as well on 10/9 for IVF and IV pain meds. Called yesterday for infusion and oxycodone refilled. Added on for further " assessment.     Interval History: Jennifer was called for virtual follow-up because I am working from home today and there is no other ANDREAS availability. She is currently have intense pain in her lower back. She is reluctant/frustrated to describe it more for me. It is encompassing her entire lower back without radiation to buttocks, legs, or thoracic or cervical spine. She did not have any injury. No n/t, weaknesses in legs. States it got really bad when she went to ED on 10/9 but agrees it was occurring last week too when she was calling in.      She is trying to manage at home with resting, taking hot showers, taking oxycodone every 4 hours in addition to either a dose of tylenol or a dose of ibuprofen. She is alternating tylenol and ibuprofen. She remains on celebrex BID and oxycontin BID.     No f/c/s. No SOB though asthma has been worse this past month. Has been okay last few days. No CP. No n/v. Bowels have been okay.     History of Present Illness:  (Initial visit remarks from Dr. 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 She also has significant anxiety and depression with a strong anxiety about transferring to the adult clinic. She usually has pain crises secondary to the anxiety.      --------------------------------  Plan last reviewed with patient: 8/17/2020    Patient background: 21yo F, enjoys movies and kids though there are times where she does not really want to talk to people. Does not have a lot of social support at home.     Sickle Cell Disease History  Primary Hematologist: Perla  PCP: Raul  Genotype: SS  Acute Pain Crisis Treatment:    ER/Acute Care/Infusion Clinic:   o Morphine 2 mg IVP/SC Q1H X 3  doses  o Toradol  o Maintenance IV fluids with D5 half-normal saline  o Other: Benadryl PO and Zofran 8 mg IV PRN itching or nausea    Inpatient:  o Opioid: Morphine 2 mg IV Q1H PRN until PCA starts  o PCA plan:   - PCA button dose: Morphine 1 mg  - Lockout: 10 minutes  - Continuous Infusion: consider 1 mg/hr  - One hr max: 6 mg  o Other Medications: Benadryl, Zofran  o If PCA not working, she Has had success with ketamine starting at 4mg/h and advancing only to 6mg/h, as 8mg/h made her feel quite poorly.  o If giving scheduled toradol, hold ASA (ok to give celebrex if she prefers)  o Supportive Care: Docusate, Senna  Chronic Pain Medications:    Home regimen  OxyCONTIN 10 mg po q 12 hrs and oxycodone 10 mg p.o. q.6 hours p.r.n. breakthrough pain.  She also continues on Celebrex, and Zoloft among other medications.    -Also benefits from mental health visits, acupuncture  Baseline Hemoglobin: 9.5 g/dL (on chronic transfusions), 7-8 without  Hydroxyurea use: Yes  H/O blood transfusions: Yes, several (iron overload) Most recent 5/22/20    H/O Transfusion Reactions: no    Antibodies:none  H/O Acute Chest Syndrome: Yes    Last episode: 10/2019     ICU/intubation: unsure  H/O Stroke: Yes (managed with chronic transfusions in the past)  H/O VTE: no  H/O Cholecystectomy or Splenectomy: no  H/O Asthma, Pulm HTN, AVN, Leg Ulcers, Nephropathy, Retinopathy, etc: Iron overload, asthma, chronic lung disease, developmental delay from early stroke    EMERGENCY CARE PLAN  DO NOT TRANSFUSE WITHOUT DISCUSSING WITH HEMATOLOGY ATTENDING (available 24/7).       TRANSFUSION IS RISKY DUE TO RISK OF ALLOIMMUNIZATION AGAINST RBC ANTIGENS AND IRON OVERLOAD.  INDICATIONS FOR TRANSFUSION ARE ACUTE STROKE AND ACUTE CHEST SYNDROME (hypoxia plus infiltrate, not chest pain).  DO NOT TRANSFUSE FOR ANEMIA      -------------------------------------------    Sickle Cell Disease Comprehensive Checklist    Bone Health/Avascular Necrosis  Screening/Symptoms (each visit): none today    Leg Ulcer evaluation (every visit): none    Hypertension (every visit): none    Last ophthalmologic exam: within last year (timing unsure)    Last urinalysis for microalbuminuria/CKD (annually): within last year    Last pulmonary evaluation (asthma, AMAN, pulm HTN): within last year    Stroke/silent cerebral infarct Hx (Y/N): Yes    Last PCP Visit: 8/2020    Vaccines:  o PCV13: 5/13/19  o Pneumovax (PPSV23): 3/04, 10/09, 7/12/19 (next due 7/2024)  o Menactra: 4/2010, 9/2015 (MCV due Sept 2020)  o Trumemba:  o Influenza: got 19-20 vaccine    Audiology (chelation): done 6/2020, normal    Past Medical History:  Past Medical History:   Diagnosis Date     Anemia      Anxiety      Bleeding disorder (H)      Blood clotting disorder (H)      Cerebral infarction (H) 2015     Cognitive developmental delay     low IQ. 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 (H)     right hand contractures     Iron overload due to repeated red blood cell transfusions      Migraines      Oppositional defiant behavior      Uncomplicated asthma        Past Surgical History:  Past Surgical History:   Procedure Laterality Date     AS INSERT TUNNELED CV 2 CATH W/O PORT/PUMP       C BREAST REDUCTION (INCLUDES LIPO) TIER 3 Bilateral 04/23/2019     IR CVC NON TUNNEL PLACEMENT  4/7/2020     REPAIR TENDON ELBOW Right 10/2/2019    Procedure: Right Elbow Flexor Lengthening, Flexor Pronator Slide Of Wrist and Finger, Thumb Adductor Lengthening;  Surgeon: Anai Franco MD;  Location: [facility] OR     TONSILLECTOMY Bilateral 10/2/2019    Procedure: Bilateral Tonsillectomy;  Surgeon: Farhana Guy MD;  Location: [facility] OR       Family History:   Family History   Problem Relation Age of Onset     Sickle Cell Trait Mother      Sickle Cell Trait Father        Social  "History:  Social History     Socioeconomic History     Marital status: Single     Spouse name: Not on file     Number of children: Not on file     Years of education: Not on file     Highest education level: Not on file   Occupational History     Not on file   Social Needs     Financial resource strain: Not on file     Food insecurity     Worry: Not on file     Inability: Not on file     Transportation needs     Medical: Not on file     Non-medical: Not on file   Tobacco Use     Smoking status: Never Smoker     Smokeless tobacco: Never Used   Substance and Sexual Activity     Alcohol use: Not Currently     Alcohol/week: 0.0 standard drinks     Drug use: Never     Sexual activity: Not Currently     Partners: Male     Birth control/protection: Other   Lifestyle     Physical activity     Days per week: Not on file     Minutes per session: Not on file     Stress: Not on file   Relationships     Social connections     Talks on phone: Not on file     Gets together: Not on file     Attends Faith service: Not on file     Active member of club or organization: Not on file     Attends meetings of clubs or organizations: Not on file     Relationship status: Not on file     Intimate partner violence     Fear of current or ex partner: Not on file     Emotionally abused: Not on file     Physically abused: Not on file     Forced sexual activity: Not on file   Other Topics Concern     Parent/sibling w/ CABG, MI or angioplasty before 65F 55M? Not Asked   Social History Narrative    Lives with mom and extended family but \"toxic environment\" per her report. She would like to move out but cannot financially do so. She has minimal support at home despite her significant SCD comorbidities and cognitive delay from stroke.       Medications:  Current Outpatient Medications   Medication     acetaminophen (TYLENOL) 325 MG tablet     albuterol (PROVENTIL) (2.5 MG/3ML) 0.083% neb solution     aspirin (ASPIRIN) 81 MG EC tablet     " "Budesonide-Formoterol Fumarate (SYMBICORT IN)     celecoxib (CELEBREX) 100 MG capsule     diphenhydrAMINE (BENADRYL) 25 MG capsule     GNP SENNA-LAX 8.6 MG tablet     Hydroxyurea 1000 MG TABS     ibuprofen (ADVIL/MOTRIN) 200 MG tablet     JADENU 360 MG tablet     medroxyPROGESTERone (DEPO-PROVERA) 150 MG/ML IM injection     naloxone (NARCAN) 4 MG/0.1ML nasal spray     ondansetron (ZOFRAN) 8 MG tablet     oxyCODONE IR (ROXICODONE) 10 MG tablet     OXYCONTIN 10 MG 12 hr tablet     sertraline (ZOLOFT) 100 MG tablet     No current facility-administered medications for this visit.          Physical Exam:   /75   Ht 1.626 m (5' 4\")   Wt 70.6 kg (155 lb 11.2 oz)   BMI 26.73 kg/m      Voice is clear and strong. No audible stridor, wheezing, or respiratory distress. The remainder of PE was deferred due to PHE.     Labs:    10/14/2020 12:03   Sodium 141   Potassium 3.8   Chloride 112 (H)   Carbon Dioxide 24   Urea Nitrogen 10   Creatinine 0.52   GFR Estimate >90   GFR Estimate If Black >90   Calcium 8.5   Anion Gap 5   Albumin 3.8   Protein Total 7.4   Bilirubin Total 3.7 (H)   Alkaline Phosphatase 63   ALT 93 (H)    (H)   Glucose 83   WBC 11.4 (H)   Hemoglobin 7.4 (L)   Hematocrit 21.9 (L)   Platelet Count 273   RBC Count 2.28 (L)   MCV 96   MCH 32.5   MCHC 33.8   RDW Dimorphic population - unable to calculate   Diff Method PENDING       Imaging: none today    Assessment:  Jennifer Cervantes is a 20 year old female with HbSS. Her disease has been complicated by stroke leading to significant cognitive delay and right UE hemiparesis, high anxiety leading to frequent pain crises on top of chronic pain syndrome, poor social structure at home with no real support, and iron overload secondary to repeated transfusions.     She has had increased pain consistently since around 10/2 which has hard to control with lack of infusion availability. She did increase home oxycodone to every 4 hours instead of every 6 hours on " 10/7. CBC today shows no evidence of hyperhemolysis or aplastic crisis. VOC seems uncomplicated with pain only and no s/s of infection or ACS.     Will treat with outpatient infusion today. I think it is reasonable to refill oxycodone 10 mg to take every 4-6 hours for this next fill given how long this crisis has lasted. Told her to try to take every 6 hours in addition to tylenol or ibuprofen but it is okay to take a dose at 4 hours if needed but also call us to try to get in for a repeat infusion if needed. This is exactly what current Rx says so I will refill the same way.       Phone call duration: 12 minutes     Amalia Danielle PA-C

## 2020-10-13 NOTE — PROGRESS NOTES
Writer placed call to Jennifer to see how her pain has been this week. No calls have been placed to triage for IVF/pain meds or any visits to ED for pain management. No answer received. Left detailed voicemail for Jennifer offering appointment with Physician Assistant or Nurse Practitioner if pain still not decreased and to call triage if needing IVF/pain meds.

## 2020-10-13 NOTE — PROGRESS NOTES
Writer received call back from Jennifer regarding pain assessment. Jennifer states that her pain is still present and she is still needing to take her oxycodone every 4 hours, unable to space back out to every 6 which has left her with a refill need. Writer advised that I would check with our infusion centers and discuss refill need with Dr Duncan. Per previous discussions with Dr Duncan, writer advised that we should have Jennifer see an ANDREAS in clinic this week as her pain needs have increased. Jennifer advised that she doesn't believe she can come tomorrow to see Amalia. Writer advised that once I have touched base with Dr Duncan, I will call her back.

## 2020-10-13 NOTE — PROGRESS NOTES
"Writer placed return call to Jennifer to advise that she is ont he waiting list for IVF/pain meds for today but that I'm not confident that we will be able to accommodate. Writer also asked how many oxycodone she has left so I get a better idea of how urgent her need for refill is. Per Jennifer, she has approximately 2 tablets left and was trying to hold out on taking them until she knew about her refill. Writer discussed with Jennifer that per Dr Duncan's advice, we need to have her come in to clinic for a provider visit to look at her increased pain. We are hesitant to refill her oxycodone with the increased use without having engagement with a provider. Writer interpreted Jennifer's response as her being upset, she stated \"You know what, you don't have to hear from me anymore, it's fine. Indio Julieta\" and call was abruptly disconnected. Writer updated Dr Duncan who would like for the appointment to be booked with Amalia with labs prior and will follow-up with her later today. Message sent to Clinic Coordinator bryanna and copied to Amalia to update her on Jennifer.   "

## 2020-10-14 ENCOUNTER — PATIENT OUTREACH (OUTPATIENT)
Dept: ONCOLOGY | Facility: CLINIC | Age: 20
End: 2020-10-14

## 2020-10-14 ENCOUNTER — INFUSION THERAPY VISIT (OUTPATIENT)
Dept: ONCOLOGY | Facility: CLINIC | Age: 20
End: 2020-10-14
Attending: PEDIATRICS
Payer: COMMERCIAL

## 2020-10-14 ENCOUNTER — TELEPHONE (OUTPATIENT)
Dept: ONCOLOGY | Facility: CLINIC | Age: 20
End: 2020-10-14

## 2020-10-14 VITALS
WEIGHT: 155.7 LBS | SYSTOLIC BLOOD PRESSURE: 123 MMHG | HEART RATE: 101 BPM | DIASTOLIC BLOOD PRESSURE: 75 MMHG | OXYGEN SATURATION: 92 % | TEMPERATURE: 98.2 F | BODY MASS INDEX: 26.73 KG/M2 | RESPIRATION RATE: 18 BRPM

## 2020-10-14 VITALS
DIASTOLIC BLOOD PRESSURE: 75 MMHG | BODY MASS INDEX: 26.58 KG/M2 | WEIGHT: 155.7 LBS | HEIGHT: 64 IN | SYSTOLIC BLOOD PRESSURE: 123 MMHG

## 2020-10-14 DIAGNOSIS — D57.1 HB-SS DISEASE WITHOUT CRISIS (H): ICD-10-CM

## 2020-10-14 DIAGNOSIS — D57.1 HB-SS DISEASE WITHOUT CRISIS (H): Primary | ICD-10-CM

## 2020-10-14 DIAGNOSIS — D57.00 SICKLE CELL CRISIS (H): ICD-10-CM

## 2020-10-14 DIAGNOSIS — D57.00 SICKLE CELL PAIN CRISIS (H): ICD-10-CM

## 2020-10-14 LAB
ALBUMIN SERPL-MCNC: 3.8 G/DL (ref 3.4–5)
ALP SERPL-CCNC: 63 U/L (ref 40–150)
ALT SERPL W P-5'-P-CCNC: 93 U/L (ref 0–50)
ANION GAP SERPL CALCULATED.3IONS-SCNC: 5 MMOL/L (ref 3–14)
ANISOCYTOSIS BLD QL SMEAR: ABNORMAL
AST SERPL W P-5'-P-CCNC: 100 U/L (ref 0–45)
BASOPHILS # BLD AUTO: 0.1 10E9/L (ref 0–0.2)
BASOPHILS NFR BLD AUTO: 0.9 %
BILIRUB SERPL-MCNC: 3.7 MG/DL (ref 0.2–1.3)
BUN SERPL-MCNC: 10 MG/DL (ref 7–30)
CALCIUM SERPL-MCNC: 8.5 MG/DL (ref 8.5–10.1)
CHLORIDE SERPL-SCNC: 112 MMOL/L (ref 94–109)
CO2 SERPL-SCNC: 24 MMOL/L (ref 20–32)
CREAT SERPL-MCNC: 0.52 MG/DL (ref 0.52–1.04)
DIFFERENTIAL METHOD BLD: ABNORMAL
EOSINOPHIL # BLD AUTO: 0.4 10E9/L (ref 0–0.7)
EOSINOPHIL NFR BLD AUTO: 3.5 %
ERYTHROCYTE [DISTWIDTH] IN BLOOD BY AUTOMATED COUNT: ABNORMAL % (ref 10–15)
GFR SERPL CREATININE-BSD FRML MDRD: >90 ML/MIN/{1.73_M2}
GLUCOSE SERPL-MCNC: 83 MG/DL (ref 70–99)
HCT VFR BLD AUTO: 21.9 % (ref 35–47)
HGB BLD-MCNC: 7.4 G/DL (ref 11.7–15.7)
LYMPHOCYTES # BLD AUTO: 2.2 10E9/L (ref 0.8–5.3)
LYMPHOCYTES NFR BLD AUTO: 19.3 %
MCH RBC QN AUTO: 32.5 PG (ref 26.5–33)
MCHC RBC AUTO-ENTMCNC: 33.8 G/DL (ref 31.5–36.5)
MCV RBC AUTO: 96 FL (ref 78–100)
MONOCYTES # BLD AUTO: 0.8 10E9/L (ref 0–1.3)
MONOCYTES NFR BLD AUTO: 7 %
NEUTROPHILS # BLD AUTO: 7.9 10E9/L (ref 1.6–8.3)
NEUTROPHILS NFR BLD AUTO: 69.3 %
NRBC # BLD AUTO: 0.9 10*3/UL
NRBC BLD AUTO-RTO: 8 /100
PLATELET # BLD AUTO: 273 10E9/L (ref 150–450)
PLATELET # BLD EST: ABNORMAL 10*3/UL
POIKILOCYTOSIS BLD QL SMEAR: ABNORMAL
POLYCHROMASIA BLD QL SMEAR: ABNORMAL
POTASSIUM SERPL-SCNC: 3.8 MMOL/L (ref 3.4–5.3)
PROT SERPL-MCNC: 7.4 G/DL (ref 6.8–8.8)
RBC # BLD AUTO: 2.28 10E12/L (ref 3.8–5.2)
SICKLE CELLS BLD QL SMEAR: ABNORMAL
SODIUM SERPL-SCNC: 141 MMOL/L (ref 133–144)
TARGETS BLD QL SMEAR: ABNORMAL
WBC # BLD AUTO: 11.4 10E9/L (ref 4–11)

## 2020-10-14 PROCEDURE — 80053 COMPREHEN METABOLIC PANEL: CPT | Performed by: PEDIATRICS

## 2020-10-14 PROCEDURE — 250N000011 HC RX IP 250 OP 636: Performed by: PEDIATRICS

## 2020-10-14 PROCEDURE — 250N000011 HC RX IP 250 OP 636: Performed by: PHYSICIAN ASSISTANT

## 2020-10-14 PROCEDURE — 99213 OFFICE O/P EST LOW 20 MIN: CPT | Mod: TEL | Performed by: PHYSICIAN ASSISTANT

## 2020-10-14 PROCEDURE — 99207 PR NO CHARGE LOS: CPT

## 2020-10-14 PROCEDURE — 258N000003 HC RX IP 258 OP 636: Performed by: PHYSICIAN ASSISTANT

## 2020-10-14 PROCEDURE — 96376 TX/PRO/DX INJ SAME DRUG ADON: CPT

## 2020-10-14 PROCEDURE — 96361 HYDRATE IV INFUSION ADD-ON: CPT

## 2020-10-14 PROCEDURE — 85025 COMPLETE CBC W/AUTO DIFF WBC: CPT | Performed by: PEDIATRICS

## 2020-10-14 PROCEDURE — 96374 THER/PROPH/DIAG INJ IV PUSH: CPT

## 2020-10-14 RX ORDER — HEPARIN SODIUM (PORCINE) LOCK FLUSH IV SOLN 100 UNIT/ML 100 UNIT/ML
5 SOLUTION INTRAVENOUS
Status: CANCELLED | OUTPATIENT
Start: 2020-10-14

## 2020-10-14 RX ORDER — DIPHENHYDRAMINE HCL 25 MG
25 CAPSULE ORAL
Status: CANCELLED
Start: 2020-10-14

## 2020-10-14 RX ORDER — MORPHINE SULFATE 2 MG/ML
2 INJECTION, SOLUTION INTRAMUSCULAR; INTRAVENOUS
Status: CANCELLED
Start: 2020-10-14

## 2020-10-14 RX ORDER — ONDANSETRON 8 MG/1
8 TABLET, FILM COATED ORAL
Status: CANCELLED
Start: 2020-10-14

## 2020-10-14 RX ORDER — HEPARIN SODIUM (PORCINE) LOCK FLUSH IV SOLN 100 UNIT/ML 100 UNIT/ML
5 SOLUTION INTRAVENOUS
Status: DISCONTINUED | OUTPATIENT
Start: 2020-10-14 | End: 2020-10-14 | Stop reason: HOSPADM

## 2020-10-14 RX ORDER — OXYCODONE HYDROCHLORIDE 10 MG/1
TABLET ORAL
Qty: 60 TABLET | Refills: 0 | Status: SHIPPED | OUTPATIENT
Start: 2020-10-14 | End: 2020-11-02

## 2020-10-14 RX ORDER — MORPHINE SULFATE 2 MG/ML
2 INJECTION, SOLUTION INTRAMUSCULAR; INTRAVENOUS
Status: DISCONTINUED | OUTPATIENT
Start: 2020-10-14 | End: 2020-10-14 | Stop reason: HOSPADM

## 2020-10-14 RX ORDER — HEPARIN SODIUM,PORCINE 10 UNIT/ML
5 VIAL (ML) INTRAVENOUS
Status: CANCELLED | OUTPATIENT
Start: 2020-10-14

## 2020-10-14 RX ORDER — HEPARIN SODIUM (PORCINE) LOCK FLUSH IV SOLN 100 UNIT/ML 100 UNIT/ML
5 SOLUTION INTRAVENOUS EVERY 8 HOURS
Status: DISCONTINUED | OUTPATIENT
Start: 2020-10-14 | End: 2020-10-14 | Stop reason: HOSPADM

## 2020-10-14 RX ADMIN — DEXTROSE AND SODIUM CHLORIDE 500 ML: 5; 450 INJECTION, SOLUTION INTRAVENOUS at 14:08

## 2020-10-14 RX ADMIN — MORPHINE SULFATE 2 MG: 2 INJECTION, SOLUTION INTRAMUSCULAR; INTRAVENOUS at 16:23

## 2020-10-14 RX ADMIN — MORPHINE SULFATE 2 MG: 2 INJECTION, SOLUTION INTRAMUSCULAR; INTRAVENOUS at 15:19

## 2020-10-14 RX ADMIN — MORPHINE SULFATE 2 MG: 2 INJECTION, SOLUTION INTRAMUSCULAR; INTRAVENOUS at 14:08

## 2020-10-14 RX ADMIN — Medication 5 ML: at 16:36

## 2020-10-14 RX ADMIN — Medication 5 ML: at 12:11

## 2020-10-14 ASSESSMENT — MIFFLIN-ST. JEOR: SCORE: 1456.25

## 2020-10-14 ASSESSMENT — PAIN SCALES - GENERAL
PAINLEVEL: EXTREME PAIN (9)
PAINLEVEL: EXTREME PAIN (9)

## 2020-10-14 NOTE — PROGRESS NOTES
Infusion Nursing Note:  Jennifer Cervantes presents today for IV fluids, pain medication.    Patient seen by provider today: Yes: RONALDO Pedraza   present during visit today: Not Applicable.    Note: Upon arrival to infusion, Jennifer rated her pain 9/10. After three doses of IV morphine, her pain was 7/10 and she stated she felt comfortable discharging home.    Intravenous Access:  Implanted Port.    Treatment Conditions:  Lab Results   Component Value Date    HGB 7.4 10/14/2020     Lab Results   Component Value Date    WBC 11.4 10/14/2020      Lab Results   Component Value Date    ANEU 7.9 10/14/2020     Lab Results   Component Value Date     10/14/2020      Lab Results   Component Value Date     10/14/2020                   Lab Results   Component Value Date    POTASSIUM 3.8 10/14/2020           No results found for: MAG         Lab Results   Component Value Date    CR 0.52 10/14/2020                   Lab Results   Component Value Date    MICAH 8.5 10/14/2020                Lab Results   Component Value Date    BILITOTAL 3.7 10/14/2020           Lab Results   Component Value Date    ALBUMIN 3.8 10/14/2020                    Lab Results   Component Value Date    ALT 93 10/14/2020           Lab Results   Component Value Date     10/14/2020           Post Infusion Assessment:  Patient tolerated infusion without incident.  Blood return noted pre and post infusion.  Site patent and intact, free from redness, edema or discomfort.  No evidence of extravasations.  Access discontinued per protocol.       Discharge Plan:   Prescription refills given for Oxycodone.  Discharge instructions reviewed with: Patient.  Patient and/or family verbalized understanding of discharge instructions and all questions answered.  AVS to patient via [employer].  Patient will return 10/23/20 for next appointment.   Patient discharged in stable condition accompanied by: self.  Departure Mode: Ambulatory.    Ember  JOE Keating

## 2020-10-14 NOTE — TELEPHONE ENCOUNTER
[facility] Telephone Triage Note    The following symptoms were reported:     Description:            Onset:  Pain has been ongoing for about one week.   Location: Back and both arms  Character: Sharp           Intensity: 9.5/10    Accompanying Signs & Symptoms:  none    Chest Pain:  None     Shortness of Breath:  None     Fever:  None     Chills:  None   Cough/sore throat:  None  Other:  No known COVID-19 contacts    Therapies Tried and outcome: She states she needs a new prescription for medication and will be refilling the prescription for oxycodone which is on file at the pharmacy on Friday. She states she has two tablets left. She tried Tylenol and Ibuprofen this morning with minimal relief.    Improved by: nothing    Patient instructions and/or follow up:  Patient states she does not need transportation. She also states she could be here in thirty minutes.     Patient scheduled for infusion (IVF/pain meds) today at 1400 in [facility] per Melissa Mcclendon RN. High priority message sent to scheduling. Patient notified.

## 2020-10-14 NOTE — PROGRESS NOTES
Nikkor placed call to Jennifer to ask if she wants her refill to go to her usual pharmacy or if she wants to have it sent to the 1st floor pharmacy since she will be in the building reducing the number of places she needs to go to. Jennifer asks that for this time, we send it to our pharmacy in the [facility].  generated and sent to provider for e-scribe.

## 2020-10-14 NOTE — LETTER
"    10/14/2020         RE: Jennifer Cervantes  [address]  [facility] [address]        Dear Colleague,    Thank you for referring your patient, Jennifer Cervantes, to the [facility]. Please see a copy of my visit note below.    Jennifer Cervantes is a 20 year old female who is being evaluated via a billable telephone visit.      The patient has been notified of following:     \"This telephone visit will be conducted via a call between you and your physician/provider. We have found that certain health care needs can be provided without the need for a physical exam.  This service lets us provide the care you need with a short phone conversation.  If a prescription is necessary we can send it directly to your pharmacy.  If lab work is needed we can place an order for that and you can then stop by our lab to have the test done at a later time.    Telephone visits are billed at different rates depending on your insurance coverage. During this emergency period, for some insurers they may be billed the same as an in-person visit.  Please reach out to your insurance provider with any questions.    If during the course of the call the physician/provider feels a telephone visit is not appropriate, you will not be charged for this service.\"    Patient has given verbal consent for Telephone visit?  Yes    What phone number would you like to be contacted at? [phone]    How would you like to obtain your AVS? Mail a copy    Patient had no vitals to report.   9/10 on pain scale.     /75   Ht 1.626 m (5' 4\")   Wt 70.6 kg (155 lb 11.2 oz)   BMI 26.73 kg/m        Ember York, [facility]        Sickle Cell Clinic Follow Up Outpatient Visit    Date of encounter: Oct 14, 2020    Visit Notes    Jennifer Cervantes is a 20 year old female who is has HbSS sickle cell disease.    She was added on for acute/subacute VOC crisis. She called on 10/2 and 10/5 and was not able to get in for infusion either day. 10/7 she " called again and was unable to get in. She was told to increase oxycodone every 4 hours. She was able to get in on 10/8 and went to ED as well on 10/9 for IVF and IV pain meds. Called yesterday for infusion and oxycodone refilled. Added on for further assessment.     Interval History: Jennifer was called for virtual follow-up because I am working from home today and there is no other ANDREAS availability. She is currently have intense pain in her lower back. She is reluctant/frustrated to describe it more for me. It is encompassing her entire lower back without radiation to buttocks, legs, or thoracic or cervical spine. She did not have any injury. No n/t, weaknesses in legs. States it got really bad when she went to ED on 10/9 but agrees it was occurring last week too when she was calling in.      She is trying to manage at home with resting, taking hot showers, taking oxycodone every 4 hours in addition to either a dose of tylenol or a dose of ibuprofen. She is alternating tylenol and ibuprofen. She remains on celebrex BID and oxycontin BID.     No f/c/s. No SOB though asthma has been worse this past month. Has been okay last few days. No CP. No n/v. Bowels have been okay.     History of Present Illness:  (Initial visit remarks from Dr. 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 She also has significant anxiety and depression with a strong anxiety about transferring to the adult clinic. She usually has pain crises secondary to the anxiety.      --------------------------------  Plan last reviewed with patient: 8/17/2020    Patient background: 19yo F, enjoys movies and kids though there are times where she does not really want to talk to  people. Does not have a lot of social support at home.     Sickle Cell Disease History  Primary Hematologist: Perla  PCP: Raul  Genotype: SS  Acute Pain Crisis Treatment:    ER/Acute Care/Infusion Clinic:   o Morphine 2 mg IVP/SC Q1H X 3 doses  o Toradol  o Maintenance IV fluids with D5 half-normal saline  o Other: Benadryl PO and Zofran 8 mg IV PRN itching or nausea    Inpatient:  o Opioid: Morphine 2 mg IV Q1H PRN until PCA starts  o PCA plan:   - PCA button dose: Morphine 1 mg  - Lockout: 10 minutes  - Continuous Infusion: consider 1 mg/hr  - One hr max: 6 mg  o Other Medications: Benadryl, Zofran  o If PCA not working, she Has had success with ketamine starting at 4mg/h and advancing only to 6mg/h, as 8mg/h made her feel quite poorly.  o If giving scheduled toradol, hold ASA (ok to give celebrex if she prefers)  o Supportive Care: Docusate, Senna  Chronic Pain Medications:    Home regimen  OxyCONTIN 10 mg po q 12 hrs and oxycodone 10 mg p.o. q.6 hours p.r.n. breakthrough pain.  She also continues on Celebrex, and Zoloft among other medications.    -Also benefits from mental health visits, acupuncture  Baseline Hemoglobin: 9.5 g/dL (on chronic transfusions), 7-8 without  Hydroxyurea use: Yes  H/O blood transfusions: Yes, several (iron overload) Most recent 5/22/20    H/O Transfusion Reactions: no    Antibodies:none  H/O Acute Chest Syndrome: Yes    Last episode: 10/2019     ICU/intubation: unsure  H/O Stroke: Yes (managed with chronic transfusions in the past)  H/O VTE: no  H/O Cholecystectomy or Splenectomy: no  H/O Asthma, Pulm HTN, AVN, Leg Ulcers, Nephropathy, Retinopathy, etc: Iron overload, asthma, chronic lung disease, developmental delay from early stroke    EMERGENCY CARE PLAN  DO NOT TRANSFUSE WITHOUT DISCUSSING WITH HEMATOLOGY ATTENDING (available 24/7).       TRANSFUSION IS RISKY DUE TO RISK OF ALLOIMMUNIZATION AGAINST RBC ANTIGENS AND IRON OVERLOAD.  INDICATIONS FOR TRANSFUSION ARE ACUTE  STROKE AND ACUTE CHEST SYNDROME (hypoxia plus infiltrate, not chest pain).  DO NOT TRANSFUSE FOR ANEMIA      -------------------------------------------    Sickle Cell Disease Comprehensive Checklist    Bone Health/Avascular Necrosis Screening/Symptoms (each visit): none today    Leg Ulcer evaluation (every visit): none    Hypertension (every visit): none    Last ophthalmologic exam: within last year (timing unsure)    Last urinalysis for microalbuminuria/CKD (annually): within last year    Last pulmonary evaluation (asthma, AMAN, pulm HTN): within last year    Stroke/silent cerebral infarct Hx (Y/N): Yes    Last PCP Visit: 8/2020    Vaccines:  o PCV13: 5/13/19  o Pneumovax (PPSV23): 3/04, 10/09, 7/12/19 (next due 7/2024)  o Menactra: 4/2010, 9/2015 (MCV due Sept 2020)  o Trumemba:  o Influenza: got 19-20 vaccine    Audiology (chelation): done 6/2020, normal    Past Medical History:  Past Medical History:   Diagnosis Date     Anemia      Anxiety      Bleeding disorder (H)      Blood clotting disorder (H)      Cerebral infarction (H) 2015     Cognitive developmental delay     low IQ. 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 (H)     right hand contractures     Iron overload due to repeated red blood cell transfusions      Migraines      Oppositional defiant behavior      Uncomplicated asthma        Past Surgical History:  Past Surgical History:   Procedure Laterality Date     AS INSERT TUNNELED CV 2 CATH W/O PORT/PUMP       C BREAST REDUCTION (INCLUDES LIPO) TIER 3 Bilateral 04/23/2019     IR CVC NON TUNNEL PLACEMENT  4/7/2020     REPAIR TENDON ELBOW Right 10/2/2019    Procedure: Right Elbow Flexor Lengthening, Flexor Pronator Slide Of Wrist and Finger, Thumb Adductor Lengthening;  Surgeon: Anai Franco MD;  Location: [facility] OR     TONSILLECTOMY Bilateral 10/2/2019    Procedure:  "Bilateral Tonsillectomy;  Surgeon: Farhana Guy MD;  Location: [facility] OR       Family History:   Family History   Problem Relation Age of Onset     Sickle Cell Trait Mother      Sickle Cell Trait Father        Social History:  Social History     Socioeconomic History     Marital status: Single     Spouse name: Not on file     Number of children: Not on file     Years of education: Not on file     Highest education level: Not on file   Occupational History     Not on file   Social Needs     Financial resource strain: Not on file     Food insecurity     Worry: Not on file     Inability: Not on file     Transportation needs     Medical: Not on file     Non-medical: Not on file   Tobacco Use     Smoking status: Never Smoker     Smokeless tobacco: Never Used   Substance and Sexual Activity     Alcohol use: Not Currently     Alcohol/week: 0.0 standard drinks     Drug use: Never     Sexual activity: Not Currently     Partners: Male     Birth control/protection: Other   Lifestyle     Physical activity     Days per week: Not on file     Minutes per session: Not on file     Stress: Not on file   Relationships     Social connections     Talks on phone: Not on file     Gets together: Not on file     Attends L[employer]-day service: Not on file     Active member of club or organization: Not on file     Attends meetings of clubs or organizations: Not on file     Relationship status: Not on file     Intimate partner violence     Fear of current or ex partner: Not on file     Emotionally abused: Not on file     Physically abused: Not on file     Forced sexual activity: Not on file   Other Topics Concern     Parent/sibling w/ CABG, MI or angioplasty before 65F 55M? Not Asked   Social History Narrative    Lives with mom and extended family but \"toxic environment\" per her report. She would like to move out but cannot financially do so. She has minimal support at home despite her significant SCD comorbidities and cognitive delay " "from stroke.       Medications:  Current Outpatient Medications   Medication     acetaminophen (TYLENOL) 325 MG tablet     albuterol (PROVENTIL) (2.5 MG/3ML) 0.083% neb solution     aspirin (ASPIRIN) 81 MG EC tablet     Budesonide-Formoterol Fumarate (SYMBICORT IN)     celecoxib (CELEBREX) 100 MG capsule     diphenhydrAMINE (BENADRYL) 25 MG capsule     GNP SENNA-LAX 8.6 MG tablet     Hydroxyurea 1000 MG TABS     ibuprofen (ADVIL/MOTRIN) 200 MG tablet     JADENU 360 MG tablet     medroxyPROGESTERone (DEPO-PROVERA) 150 MG/ML IM injection     naloxone (NARCAN) 4 MG/0.1ML nasal spray     ondansetron (ZOFRAN) 8 MG tablet     oxyCODONE IR (ROXICODONE) 10 MG tablet     OXYCONTIN 10 MG 12 hr tablet     sertraline (ZOLOFT) 100 MG tablet     No current facility-administered medications for this visit.          Physical Exam:   /75   Ht 1.626 m (5' 4\")   Wt 70.6 kg (155 lb 11.2 oz)   BMI 26.73 kg/m      Voice is clear and strong. No audible stridor, wheezing, or respiratory distress. The remainder of PE was deferred due to PHE.     Labs:    10/14/2020 12:03   Sodium 141   Potassium 3.8   Chloride 112 (H)   Carbon Dioxide 24   Urea Nitrogen 10   Creatinine 0.52   GFR Estimate >90   GFR Estimate If Black >90   Calcium 8.5   Anion Gap 5   Albumin 3.8   Protein Total 7.4   Bilirubin Total 3.7 (H)   Alkaline Phosphatase 63   ALT 93 (H)    (H)   Glucose 83   WBC 11.4 (H)   Hemoglobin 7.4 (L)   Hematocrit 21.9 (L)   Platelet Count 273   RBC Count 2.28 (L)   MCV 96   MCH 32.5   MCHC 33.8   RDW Dimorphic population - unable to calculate   Diff Method PENDING       Imaging: none today    Assessment:  Jennifer Cervantes is a 20 year old female with HbSS. Her disease has been complicated by stroke leading to significant cognitive delay and right UE hemiparesis, high anxiety leading to frequent pain crises on top of chronic pain syndrome, poor social structure at home with no real support, and iron overload secondary to repeated " transfusions.     She has had increased pain consistently since around 10/2 which has hard to control with lack of infusion availability. She did increase home oxycodone to every 4 hours instead of every 6 hours on 10/7. CBC today shows no evidence of hyperhemolysis or aplastic crisis. VOC seems uncomplicated with pain only and no s/s of infection or ACS.     Will treat with outpatient infusion today. I think it is reasonable to refill oxycodone 10 mg to take every 4-6 hours for this next fill given how long this crisis has lasted. Told her to try to take every 6 hours in addition to tylenol or ibuprofen but it is okay to take a dose at 4 hours if needed but also call us to try to get in for a repeat infusion if needed. This is exactly what current Rx says so I will refill the same way.       Phone call duration: 12 minutes     Amalia Danielle PA-C             Again, thank you for allowing me to participate in the care of your patient.        Sincerely,        Amalia Danielle PA-C

## 2020-10-14 NOTE — NURSING NOTE
Chief Complaint   Patient presents with     Port Draw     power needle, heparin locked, vitals checked     Elena Mcghee RN on 10/14/2020 at 12:15 PM

## 2020-10-21 ENCOUNTER — THERAPY VISIT (OUTPATIENT)
Dept: PHYSICAL THERAPY | Facility: CLINIC | Age: 20
End: 2020-10-21
Payer: COMMERCIAL

## 2020-10-21 DIAGNOSIS — Z86.73 HISTORY OF STROKE: Primary | ICD-10-CM

## 2020-10-21 PROCEDURE — 97116 GAIT TRAINING THERAPY: CPT | Mod: GP | Performed by: PHYSICAL THERAPIST

## 2020-10-21 PROCEDURE — 97110 THERAPEUTIC EXERCISES: CPT | Mod: GP | Performed by: PHYSICAL THERAPIST

## 2020-10-29 ENCOUNTER — TELEPHONE (OUTPATIENT)
Dept: PHYSICAL THERAPY | Facility: CLINIC | Age: 20
End: 2020-10-29

## 2020-10-29 NOTE — TELEPHONE ENCOUNTER
"LVM relaying info from provider:    \"This patient was a no show for me but I think it was that she forgot to cx her appts.  She was making very good progress and we talked about cancelling her appts until 11/11.  Can you please brittany today as a no show, cancel her 11/4 appt and call her to confirm that 11/4 is cx and that she can still come in as scheduled on 11/11.\"     Left  Rehab number for any cancellations/rescheduling.   "

## 2020-11-02 DIAGNOSIS — F41.9 ANXIETY: ICD-10-CM

## 2020-11-02 DIAGNOSIS — D57.1 HB-SS DISEASE WITHOUT CRISIS (H): ICD-10-CM

## 2020-11-02 DIAGNOSIS — D57.00 SICKLE CELL CRISIS (H): ICD-10-CM

## 2020-11-02 DIAGNOSIS — Z86.73 HISTORY OF STROKE: ICD-10-CM

## 2020-11-02 RX ORDER — ALBUTEROL SULFATE 0.83 MG/ML
2.5 SOLUTION RESPIRATORY (INHALATION) EVERY 6 HOURS PRN
Qty: 12 ML | Refills: 4 | Status: SHIPPED | OUTPATIENT
Start: 2020-11-02 | End: 2020-12-23

## 2020-11-02 RX ORDER — SERTRALINE HYDROCHLORIDE 100 MG/1
200 TABLET, FILM COATED ORAL DAILY
Qty: 60 TABLET | Refills: 3 | Status: SHIPPED | OUTPATIENT
Start: 2020-11-02 | End: 2020-12-23

## 2020-11-02 RX ORDER — OXYCODONE HYDROCHLORIDE 10 MG/1
TABLET ORAL
Qty: 60 TABLET | Refills: 0 | Status: SHIPPED | OUTPATIENT
Start: 2020-11-02 | End: 2020-11-20

## 2020-11-02 RX ORDER — OXYCODONE HYDROCHLORIDE 10 MG/1
10 TABLET, FILM COATED, EXTENDED RELEASE ORAL EVERY 12 HOURS
Qty: 60 TABLET | Refills: 0 | Status: SHIPPED | OUTPATIENT
Start: 2020-11-02 | End: 2020-12-02

## 2020-11-02 RX ORDER — CELECOXIB 100 MG/1
100 CAPSULE ORAL 2 TIMES DAILY
Qty: 60 CAPSULE | Refills: 3 | Status: SHIPPED | OUTPATIENT
Start: 2020-11-02 | End: 2021-02-05

## 2020-11-02 NOTE — TELEPHONE ENCOUNTER
"Rx teed up to Amalia for approval. Called pt back to confirm symbicort dose, she thought it was \"300\", informed her available doses are 80 or 160. She stated she did not have the inhaler or box anymore, confirmed with [facility] pharmacy was not filled there before. Asked pt to find out what dose she was on and call back.  "

## 2020-11-02 NOTE — TELEPHONE ENCOUNTER
Pt called in to triage requesting IVF pain meds for back pain rated 8/10 x2 days. Stated last took prn Oxy 10 mg last night around 11 pm, prn tylenol and ibuprofen 8 am this morning without relief. Stated she was using the Oxy every 6 hours before she ran out. Also ran out of oxycontin, hydrea, sertraline, aspirin, benadryl. Request refills to [facility] pharmacy.    Denied any fevers, chills, cough, chest pain or other symptoms. Stated asthma has been worse with season change, taking scheduled inhalers and nebulizers, state effective for relief of sob. May also need albuterol nebs (q4-6h) and symbicort inhaler 2x day, does not remember if she got these previously from [facility] or Pcp.    Pt's last infusion was 10/14, last clinic visit 10/14 with Amalia HIGGINS. She no-showed for an apt with Dr. Duncan 10/23 and when asked about this, state she did not know she had an apt. She is requesting if she can get into infusion today, if she can have a bed instead of chair. Paged Dr. Duncan.

## 2020-11-02 NOTE — TELEPHONE ENCOUNTER
Per Dr. Duncan: please have Amalia assist in refilling meds, ok for IVF pain meds and labs today. RNCC to follow-up with pt regarding missing apt, follow-up apt and home situation. Pt added on to infusion list for today. Amalia paged.

## 2020-11-03 ENCOUNTER — TELEPHONE (OUTPATIENT)
Dept: ONCOLOGY | Facility: CLINIC | Age: 20
End: 2020-11-03

## 2020-11-03 ENCOUNTER — PATIENT OUTREACH (OUTPATIENT)
Dept: ONCOLOGY | Facility: CLINIC | Age: 20
End: 2020-11-03

## 2020-11-03 ENCOUNTER — INFUSION THERAPY VISIT (OUTPATIENT)
Dept: INFUSION THERAPY | Facility: CLINIC | Age: 20
End: 2020-11-03
Attending: PEDIATRICS
Payer: COMMERCIAL

## 2020-11-03 VITALS
HEART RATE: 106 BPM | SYSTOLIC BLOOD PRESSURE: 155 MMHG | TEMPERATURE: 97.7 F | OXYGEN SATURATION: 94 % | DIASTOLIC BLOOD PRESSURE: 94 MMHG | RESPIRATION RATE: 18 BRPM

## 2020-11-03 DIAGNOSIS — D57.00 SICKLE CELL PAIN CRISIS (H): Primary | ICD-10-CM

## 2020-11-03 PROCEDURE — 96374 THER/PROPH/DIAG INJ IV PUSH: CPT

## 2020-11-03 PROCEDURE — 250N000011 HC RX IP 250 OP 636: Performed by: PHYSICIAN ASSISTANT

## 2020-11-03 PROCEDURE — 96361 HYDRATE IV INFUSION ADD-ON: CPT

## 2020-11-03 PROCEDURE — 258N000003 HC RX IP 258 OP 636: Performed by: PHYSICIAN ASSISTANT

## 2020-11-03 PROCEDURE — 99207 PR NO CHARGE LOS: CPT

## 2020-11-03 PROCEDURE — 250N000011 HC RX IP 250 OP 636: Performed by: PEDIATRICS

## 2020-11-03 PROCEDURE — 96376 TX/PRO/DX INJ SAME DRUG ADON: CPT

## 2020-11-03 RX ORDER — ONDANSETRON 8 MG/1
8 TABLET, FILM COATED ORAL
Status: CANCELLED
Start: 2020-11-03

## 2020-11-03 RX ORDER — DIPHENHYDRAMINE HCL 25 MG
25 CAPSULE ORAL
Status: DISCONTINUED | OUTPATIENT
Start: 2020-11-03 | End: 2020-11-03 | Stop reason: HOSPADM

## 2020-11-03 RX ORDER — HEPARIN SODIUM,PORCINE 10 UNIT/ML
5 VIAL (ML) INTRAVENOUS
Status: CANCELLED | OUTPATIENT
Start: 2020-11-03

## 2020-11-03 RX ORDER — DIPHENHYDRAMINE HCL 25 MG
25 CAPSULE ORAL
Status: CANCELLED
Start: 2020-11-03

## 2020-11-03 RX ORDER — HEPARIN SODIUM (PORCINE) LOCK FLUSH IV SOLN 100 UNIT/ML 100 UNIT/ML
5 SOLUTION INTRAVENOUS
Status: DISCONTINUED | OUTPATIENT
Start: 2020-11-03 | End: 2020-11-03 | Stop reason: HOSPADM

## 2020-11-03 RX ORDER — HEPARIN SODIUM,PORCINE 10 UNIT/ML
5 VIAL (ML) INTRAVENOUS
Status: DISCONTINUED | OUTPATIENT
Start: 2020-11-03 | End: 2020-11-03 | Stop reason: HOSPADM

## 2020-11-03 RX ORDER — MORPHINE SULFATE 2 MG/ML
2 INJECTION, SOLUTION INTRAMUSCULAR; INTRAVENOUS
Status: COMPLETED | OUTPATIENT
Start: 2020-11-03 | End: 2020-11-03

## 2020-11-03 RX ORDER — ONDANSETRON 8 MG/1
8 TABLET, ORALLY DISINTEGRATING ORAL
Status: DISCONTINUED | OUTPATIENT
Start: 2020-11-03 | End: 2020-11-03 | Stop reason: HOSPADM

## 2020-11-03 RX ORDER — MORPHINE SULFATE 2 MG/ML
2 INJECTION, SOLUTION INTRAMUSCULAR; INTRAVENOUS
Status: CANCELLED
Start: 2020-11-03

## 2020-11-03 RX ORDER — HEPARIN SODIUM (PORCINE) LOCK FLUSH IV SOLN 100 UNIT/ML 100 UNIT/ML
5 SOLUTION INTRAVENOUS
Status: CANCELLED | OUTPATIENT
Start: 2020-11-03

## 2020-11-03 RX ORDER — ONDANSETRON 8 MG/1
8 TABLET, FILM COATED ORAL
Status: DISCONTINUED | OUTPATIENT
Start: 2020-11-03 | End: 2020-11-03

## 2020-11-03 RX ADMIN — MORPHINE SULFATE 2 MG: 2 INJECTION, SOLUTION INTRAMUSCULAR; INTRAVENOUS at 15:31

## 2020-11-03 RX ADMIN — Medication 5 ML: at 15:33

## 2020-11-03 RX ADMIN — MORPHINE SULFATE 2 MG: 2 INJECTION, SOLUTION INTRAMUSCULAR; INTRAVENOUS at 13:28

## 2020-11-03 RX ADMIN — DEXTROSE AND SODIUM CHLORIDE 500 ML: 5; 450 INJECTION, SOLUTION INTRAVENOUS at 13:27

## 2020-11-03 RX ADMIN — MORPHINE SULFATE 2 MG: 2 INJECTION, SOLUTION INTRAMUSCULAR; INTRAVENOUS at 14:26

## 2020-11-03 NOTE — PROGRESS NOTES
ossTraming Note  Jennifer Cervantes [facility] for:   Chief Complaint   Patient presents with     Infusion     IVF, pain meds     During today's [facility] appointment, orders from Dr. Duncan were completed.  Frequency: as needed    Progress note:  Patient identification verified by name and date of birth.  Assessment completed.  Vitals recorded in Doc Flowsheets.  Patient was provided with education regarding medication/procedure and possible side effects.  Patient verbalized understanding.     present during visit today: Not Applicable.    Treatment Conditions: Non-applicable.    Premedications: were not ordered.    Drug Waste Record: No    Infusion length and rate: IVF infusion given over approximately 2 hours at 250 ml/hr.    Labs: were not ordered for this appointment.    Vascular access: port accessed today.    Post Infusion Assessment:  Patient tolerated infusion without incident.     Discharge Plan:   Follow up plan of care with: ordering provider as scheduled. and after visit summary declined by patient  Discharge instructions were reviewed with patient.  Patient/representative verbalized understanding of discharge instructions and all questions answered.  Patient discharged from [facility] in stable condition.    Malgorzata Holcomb RN       Administrations This Visit     dextrose 5% and 0.45% NaCl BOLUS     Admin Date  11/03/2020 Action  New Bag Dose  500 mL Rate  250 mL/hr Route  Intravenous Administered By  Malgorzata Holcomb RN          morphine (PF) injection 2 mg     Admin Date  11/03/2020 Action  Given Dose  2 mg Route  Intravenous Administered By  Malgorzata Holcomb RN           Admin Date  11/03/2020 Action  Given Dose  2 mg Route  Intravenous Administered By  Malgorzata Holcomb RN                BP (!) 155/94 (BP Location: Left arm)   Pulse 106   Temp 97.7  F (36.5  C) (Oral)   Resp 18    SpO2 94%

## 2020-11-03 NOTE — TELEPHONE ENCOUNTER
[facility] Telephone Triage Note    The following symptoms were reported:     Description:            Onset:  A couple of days ago   Location: bilateral back pain  Character: Dull ache           Intensity: moderate to severe    Accompanying Signs & Symptoms:  none    Chest Pain:  Denies     Shortness of Breath:  Denies     Fever:  Denies     Chills:  Denies   Cough/sore throat:  Denies  Other:  No Known COVID-19 contacts    Therapies Tried and outcome: home pain medications.    Improved by: nothing    The following provider was consulted:  Refer to 11/2/20 refill encounter, patient was unable to get in for approved IVF/pain meds yesterday. Added patient to list this morning, she denies a change in symptoms or new symptoms compared to triage call yesterday.    Patient instructions and/or follow up:  Informed patient her name would be added to infusion list and she would be contacted if/when an appointment becomes available. She verbalizes understanding.    [facility] can see patient for infusion today at 1230 with labs prior. Patient notified. High priority message sent to scheduling.

## 2020-11-03 NOTE — LETTER
11/3/2020         RE: Jennifer Cervantes  [address]  [facility] [address]        Dear Colleague,    Thank you for referring your patient, Jennifer Cervantes, to the [facility]. Please see a copy of my visit note below.    ossNursing Note  Jennifer Cervantes presents [facility] for:   Chief Complaint   Patient presents with     Infusion     IVF, pain meds     During today's [facility] appointment, orders from Dr. Duncan were completed.  Frequency: as needed    Progress note:  Patient identification verified by name and date of birth.  Assessment completed.  Vitals recorded in Doc Flowsheets.  Patient was provided with education regarding medication/procedure and possible side effects.  Patient verbalized understanding.     present during visit today: Not Applicable.    Treatment Conditions: Non-applicable.    Premedications: were not ordered.    Drug Waste Record: No    Infusion length and rate: IVF infusion given over approximately 2 hours at 250 ml/hr.    Labs: were not ordered for this appointment.    Vascular access: port accessed today.    Post Infusion Assessment:  Patient tolerated infusion without incident.     Discharge Plan:   Follow up plan of care with: ordering provider as scheduled. and after visit summary declined by patient  Discharge instructions were reviewed with patient.  Patient/representative verbalized understanding of discharge instructions and all questions answered.  Patient discharged [facility] in stable condition.    Malgorzata Glass RN       Administrations This Visit     dextrose 5% and 0.45% NaCl BOLUS     Admin Date  11/03/2020 Action  New Bag Dose  500 mL Rate  250 mL/hr Route  Intravenous Administered By  Malgorzata Glass, RN          morphine (PF) injection 2 mg     Admin Date  11/03/2020 Action  Given Dose  2 mg  Route  Intravenous Administered By  Malgorzata Holcomb, RN           Admin Date  11/03/2020 Action  Given Dose  2 mg Route  Intravenous Administered By  Malgorzata Holcomb, RN                BP (!) 155/94 (BP Location: Left arm)   Pulse 106   Temp 97.7  F (36.5  C) (Oral)   Resp 18   SpO2 94%         Again, thank you for allowing me to participate in the care of your patient.        Sincerely,        [facility]

## 2020-11-03 NOTE — PATIENT INSTRUCTIONS
Dear Jennifer Cervantes    Thank you for choosing [facility] ([facility]) for your infusion.  The following information is a summary of our appointment as well as important reminders.      We look forward in seeing you on your next appointment here at [facility] ([facility]).  Please don t hesitate to call us at [phone] to reschedule any of your appointments or to speak with one of the [facility] registered nurses.  It was a pleasure taking care of you today.    Sincerely,    [facility]  [facility]  [address] [place] [address]  [address]  Phone:  [phone]

## 2020-11-04 ENCOUNTER — TELEPHONE (OUTPATIENT)
Dept: ONCOLOGY | Facility: CLINIC | Age: 20
End: 2020-11-04

## 2020-11-04 NOTE — TELEPHONE ENCOUNTER
[facility] Telephone Triage Note    The following symptoms were reported:     Description:            Onset:  Ongoing for the past couple of days, improved and then began again late last night   Location: all over her back  Character: Sharp           Intensity: 8/10    Accompanying Signs & Symptoms:  none    Chest Pain:  Denies     Shortness of Breath:  Denies     Fever:  Denies     Chills:  Denies   Cough/sore throat:  Denies  Other:  Denies known COVID-19 contacts. Denies urinary symptoms.    Therapies Tried and outcome: Hydra, ASA, sertraline. Oxycontin and Oxycodone. She is taking Oxycodone 10 mg every six hours. She feels that if she needs her oxycodone every four hours then it prompts her to call in for infusion because she does not want to take the oxycodone every four hours because she will run out of medications. She tried warm bath. Last dose of Oxycodone 10 mg was around 0500.     Improved by: nothing    The following provider was consulted:  Paged Dr. Duncan [phone]. RNCC has connected with sam Krishnamurthy for patient to return to clinic for IVF/pain meds today and ANDREAS visit. If unable to get in for infusion and/or ANDREAS visit patient to present to ED today.      Patient instructions and/or follow up:  Informed patient she would be added to infusion list and would be contact if/when an appointment opens up.     Called patient at 1400 as infusion has been booked today. Advised patient to seek care in ED for continued pain. She verbalizes understanding and will present to [facility] ED this afternoon.

## 2020-11-06 ENCOUNTER — ONCOLOGY VISIT (OUTPATIENT)
Dept: ONCOLOGY | Facility: CLINIC | Age: 20
End: 2020-11-06
Attending: PEDIATRICS
Payer: COMMERCIAL

## 2020-11-06 VITALS
SYSTOLIC BLOOD PRESSURE: 137 MMHG | TEMPERATURE: 97.1 F | OXYGEN SATURATION: 96 % | RESPIRATION RATE: 16 BRPM | WEIGHT: 158.4 LBS | BODY MASS INDEX: 27.04 KG/M2 | DIASTOLIC BLOOD PRESSURE: 90 MMHG | HEIGHT: 64 IN | HEART RATE: 92 BPM

## 2020-11-06 DIAGNOSIS — J45.909 ASTHMATIC BRONCHITIS WITHOUT COMPLICATION, UNSPECIFIED ASTHMA SEVERITY, UNSPECIFIED WHETHER PERSISTENT: ICD-10-CM

## 2020-11-06 DIAGNOSIS — E83.111 IRON OVERLOAD DUE TO REPEATED RED BLOOD CELL TRANSFUSIONS: ICD-10-CM

## 2020-11-06 DIAGNOSIS — D57.1 HB-SS DISEASE WITHOUT CRISIS (H): Primary | ICD-10-CM

## 2020-11-06 PROCEDURE — G0463 HOSPITAL OUTPT CLINIC VISIT: HCPCS

## 2020-11-06 PROCEDURE — 99213 OFFICE O/P EST LOW 20 MIN: CPT | Performed by: PEDIATRICS

## 2020-11-06 RX ORDER — DEFERASIROX 360 MG/1
1440 TABLET, FILM COATED ORAL DAILY
Qty: 120 TABLET | Refills: 4 | Status: SHIPPED | OUTPATIENT
Start: 2020-11-06 | End: 2020-12-04

## 2020-11-06 ASSESSMENT — PAIN SCALES - GENERAL: PAINLEVEL: EXTREME PAIN (8)

## 2020-11-06 ASSESSMENT — MIFFLIN-ST. JEOR: SCORE: 1468.5

## 2020-11-06 NOTE — NURSING NOTE
"Oncology Rooming Note    November 6, 2020 1:35 PM   Jennifer Cervantes is a 20 year old female who presents for:    Chief Complaint   Patient presents with     Oncology Clinic Visit     Return: Sickle Cell Anemia      Initial Vitals: BP (!) 137/90 (BP Location: Left arm, Patient Position: Sitting, Cuff Size: Adult Regular)   Pulse 92   Temp 97.1  F (36.2  C) (Tympanic)   Resp 16   Ht 1.626 m (5' 4\")   Wt 71.8 kg (158 lb 6.4 oz)   SpO2 96%   BMI 27.19 kg/m   Estimated body mass index is 27.19 kg/m  as calculated from the following:    Height as of this encounter: 1.626 m (5' 4\").    Weight as of this encounter: 71.8 kg (158 lb 6.4 oz). Body surface area is 1.8 meters squared.  Extreme Pain (8) Comment: Data Unavailable   No LMP recorded. Patient has had an injection.  Allergies reviewed: Yes  Medications reviewed: Yes    Medications: Medication refills not needed today.  Pharmacy name entered into [employer]:    [facility] - [place], [place] - [address] [facility][id number] - [facility] [address] SEC OF [place], [place] [address]  [facility] #[id number] [facility] [address] AT [facility] #[id number] [facility] [address]    Clinical concerns: N/A        Shavonne Guerrero CMA              "

## 2020-11-06 NOTE — PROGRESS NOTES
"  Sickle Cell Clinic Follow Up Outpatient Visit    Date of encounter: Nov 6, 2020    Visit Notes    Jennifer Cervantes is a 20 year old female who is has HbSS sickle cell disease.    Interval History: Jennifer has been able to stay out of the hospital for the last several months, though she has needed to come in occasionally for infusions, most recently earlier this week. She continues to have low back pain. She really tries to stick to the pain plan we have in place for home and rarely takes more oxycodone than Rx unless told to do so. She uses hot packs and NSAIDS mostly. She feels a bit better today.    She does not have any known sick contacts. She said her home is \"crazy\" and she tries to stay out of the fray. No reports of HA, new weakness, chest pain, dyspnea, or bleeding .She is happy with her current rehab and her new leg orthotic.  While she has not had any dyspnea this week, she does think her asthma has been worse this past month. She is requesting a new nebulizer     History of Present Illness:  (Initial visit remarks from Dr. 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 She also has significant anxiety and depression with a strong anxiety about transferring to the adult clinic. She usually has pain crises secondary to the anxiety.      --------------------------------  Plan last reviewed with patient: 11/6/2020    Patient background: 19yo F, enjoys movies and kids though there are times where she does not really want to talk to people. Does not have a lot of social support at home.     Sickle Cell Disease History  Primary Hematologist: Perla  PCP: Raul  Genotype: SS  Acute Pain Crisis Treatment:    ER/Acute Care/Infusion " Clinic:   o Morphine 2 mg IVP/SC Q1H X 3 doses  o Toradol x1  o Maintenance IV fluids with LR  o Other: Benadryl PO and Zofran 8 mg IV PRN itching or nausea    Inpatient:  o Opioid: Morphine 2 mg IV Q1H PRN until PCA starts  o PCA plan:   - PCA button dose: Morphine 1 mg  - Lockout: 20 minutes  - Continuous Infusion: consider 1 mg/hr  - One hr max: 4 mg  o Other Medications: Benadryl, Zofran  o If PCA not working, she Has had success with ketamine starting at 4mg/h and advancing only to 6mg/h, as 8mg/h made her feel quite poorly.  o Continue ASA (ok to give celebrex)--discuss Toradol with hematology first  o Supportive Care: Docusate, Senna  Chronic Pain Medications:    Home regimen  OxyCONTIN 10 mg po q 12 hrs and oxycodone 10 mg p.o. q.6 hours p.r.n. breakthrough pain.  She also continues on Celebrex, and Zoloft among other medications.    -Also benefits from mental health visits, acupuncture  Baseline Hemoglobin: 9.5 g/dL (on chronic transfusions), 7-8 without  Hydroxyurea use: Yes  H/O blood transfusions: Yes, several (iron overload) Most recent 5/22/20    H/O Transfusion Reactions: no    Antibodies:none  H/O Acute Chest Syndrome: Yes    Last episode: 10/2019     ICU/intubation: unsure  H/O Stroke: Yes (managed with chronic transfusions in the past)  H/O VTE: no  H/O Cholecystectomy or Splenectomy: no  H/O Asthma, Pulm HTN, AVN, Leg Ulcers, Nephropathy, Retinopathy, etc: Iron overload, asthma, chronic lung disease, developmental delay from early stroke    -------------------------------------------    Sickle Cell Disease Comprehensive Checklist    Bone Health/Avascular Necrosis Screening/Symptoms (each visit): no new concerns today    Leg Ulcer evaluation (every visit): none    Hypertension (every visit): mildly hypertensive today, unclear whether it is anxiety about being in clinic or more than that    Last ophthalmologic exam: 7/29/20    Last urinalysis for microalbuminuria/CKD (annually): within last  year    Last pulmonary evaluation (asthma, AMAN, pulm HTN): within last year    Stroke/silent cerebral infarct Hx (Y/N): Yes TIA ~2014, first event ~age 1 with full stroke and R sided weakness    Last PCP Visit: 8/2020    Vaccines:  o PCV13: 5/13/19  o Pneumovax (PPSV23): 3/04, 10/09, 7/12/19 (next due 7/2024)  o Menactra: 4/2010, 9/2015 (MCV due Sept 2020)  o Influenza: got 19-20 vaccine    Audiology (chelation): done 6/2020, normal    Past Medical History:  Past Medical History:   Diagnosis Date     Anemia      Anxiety      Bleeding disorder (H)      Blood clotting disorder (H)      Cerebral infarction (H) 2015     Cognitive developmental delay     low IQ. 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 (H)     right hand contractures     Iron overload due to repeated red blood cell transfusions      Migraines      Oppositional defiant behavior      Uncomplicated asthma        Past Surgical History:  Past Surgical History:   Procedure Laterality Date     AS INSERT TUNNELED CV 2 CATH W/O PORT/PUMP       C BREAST REDUCTION (INCLUDES LIPO) TIER 3 Bilateral 04/23/2019     IR CVC NON TUNNEL PLACEMENT  4/7/2020     REPAIR TENDON ELBOW Right 10/2/2019    Procedure: Right Elbow Flexor Lengthening, Flexor Pronator Slide Of Wrist and Finger, Thumb Adductor Lengthening;  Surgeon: Anai Franco MD;  [facility]     TONSILLECTOMY Bilateral 10/2/2019    Procedure: Bilateral Tonsillectomy;  Surgeon: Farhana Guy MD;  [facility]: [facility] OR       Family History:   Family History   Problem Relation Age of Onset     Sickle Cell Trait Mother      Sickle Cell Trait Father        Social History:  Social History     Socioeconomic History     Marital status: Single     Spouse name: Not on file     Number of children: Not on file     Years of education: Not on file     Highest education level:  "Not on file   Occupational History     Not on file   Social Needs     Financial resource strain: Not on file     Food insecurity     Worry: Not on file     Inability: Not on file     Transportation needs     Medical: Not on file     Non-medical: Not on file   Tobacco Use     Smoking status: Never Smoker     Smokeless tobacco: Never Used   Substance and Sexual Activity     Alcohol use: Not Currently     Alcohol/week: 0.0 standard drinks     Drug use: Never     Sexual activity: Not Currently     Partners: Male     Birth control/protection: Other   Lifestyle     Physical activity     Days per week: Not on file     Minutes per session: Not on file     Stress: Not on file   Relationships     Social connections     Talks on phone: Not on file     Gets together: Not on file     Attends Sikhism service: Not on file     Active member of club or organization: Not on file     Attends meetings of clubs or organizations: Not on file     Relationship status: Not on file     Intimate partner violence     Fear of current or ex partner: Not on file     Emotionally abused: Not on file     Physically abused: Not on file     Forced sexual activity: Not on file   Other Topics Concern     Parent/sibling w/ CABG, MI or angioplasty before 65F 55M? Not Asked   Social History Narrative    Lives with mom and extended family but \"toxic environment\" per her report. She would like to move out but cannot financially do so. She has minimal support at home despite her significant SCD comorbidities and cognitive delay from stroke.       Medications:  Current Outpatient Medications   Medication     acetaminophen (TYLENOL) 325 MG tablet     albuterol (PROVENTIL) (2.5 MG/3ML) 0.083% neb solution     aspirin (ASPIRIN) 81 MG EC tablet     Budesonide-Formoterol Fumarate (SYMBICORT IN)     celecoxib (CELEBREX) 100 MG capsule     diphenhydrAMINE (BENADRYL) 25 MG capsule     GNP SENNA-LAX 8.6 MG tablet     Hydroxyurea 1000 MG TABS     ibuprofen " "(ADVIL/MOTRIN) 200 MG tablet     JADENU 360 MG tablet     medroxyPROGESTERone (DEPO-PROVERA) 150 MG/ML IM injection     naloxone (NARCAN) 4 MG/0.1ML nasal spray     ondansetron (ZOFRAN) 8 MG tablet     oxyCODONE IR (ROXICODONE) 10 MG tablet     OXYCONTIN 10 MG 12 hr tablet     sertraline (ZOLOFT) 100 MG tablet     No current facility-administered medications for this visit.          Physical Exam:   BP (!) 137/90 (BP Location: Left arm, Patient Position: Sitting, Cuff Size: Adult Regular)   Pulse 92   Temp 97.1  F (36.2  C) (Tympanic)   Resp 16   Ht 1.626 m (5' 4\")   Wt 71.8 kg (158 lb 6.4 oz)   SpO2 96%   BMI 27.19 kg/m      GEN: young  female, reserved but seeming to be in pretty good spirits today  HEENT: full head of hair, no icterus, MMM  NECK: no adenopathy  CV: RRR, no murmurs  RESP: CTA bilaterally, no wheezing  MSK: brace on right wrist and right ankle (AFO)  NEURO: alert and oriented, stable contracture of right wrist. Gait stable with AFO  SKIN: port site c/d/i.     Labs:   No new labs today    Imaging: none today    Assessment:  Jennifer Cervantes is a 20 year old female with HbSS. Her disease has been complicated by stroke leading to significant cognitive delay and right sided hemiparesis, high anxiety leading to frequent pain crises on top of chronic pain syndrome, poor social structure at home with no real support, and iron overload secondary to repeated transfusions.     Her pain seems better controlled today and she seems to be in good spirits, though that can wax and wane. She said her support at home is still quite limited. She also mentioned she stays at home most of the time, even pre-pandemic, because her friend [place] is quite limited. She said she is fine with that for now, and says it is because \"she is mean so no one wants to be friends\". We discussed that if she does have needs or concerns, she can call our team and we help her out. .     She had recent refills of most of " her medications and does not need any opioids at this time. I am sending a new Rx for a nebulizer per her request, and our nurse care coordinator will help with the calling for this next week.      I spent a total of 20 minutes face-to-face with Jennifer Cervantes during today's office visit. See note for details.    Eric Duncan MD  Hematologist  Division of Hematology, Oncology, and Transplantation  [facility]  [facility]  Pager: [phone]

## 2020-11-06 NOTE — LETTER
"    11/6/2020         RE: Jennifer Cervantes  [address]  [facility] [address]        Dear Colleague,    Thank you for referring your patient, Jennifer Cervantes, to the [facility]. Please see a copy of my visit note below.      Sickle Cell Clinic Follow Up Outpatient Visit    Date of encounter: Nov 6, 2020    Visit Notes    Jennifer Cervantes is a 20 year old female who is has HbSS sickle cell disease.    Interval History: Jennifer has been able to stay out of the hospital for the last several months, though she has needed to come in occasionally for infusions, most recently earlier this week. She continues to have low back pain. She really tries to stick to the pain plan we have in place for home and rarely takes more oxycodone than Rx unless told to do so. She uses hot packs and NSAIDS mostly. She feels a bit better today.    She does not have any known sick contacts. She said her home is \"crazy\" and [facility]. No reports of HA, new weakness, chest pain, dyspnea, or bleeding .She is happy with her current rehab and her new leg orthotic.  While she has not had any dyspnea this week, she does think her asthma has been worse this past month. She is requesting a new nebulizer     History of Present Illness:  (Initial visit remarks from Dr. 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 She also has significant anxiety and depression with a strong anxiety about transferring to the adult clinic. She usually has pain crises secondary to the anxiety.      --------------------------------  Plan last reviewed with patient: 11/6/2020    Patient background: 19yo F, enjoys movies and kids though there are " times where she does not really want to talk to people. Does not have a lot of social support at home.     Sickle Cell Disease History  Primary Hematologist: Perla  PCP: Raul  Genotype: SS  Acute Pain Crisis Treatment:    ER/Acute Care/Infusion Clinic:   o Morphine 2 mg IVP/SC Q1H X 3 doses  o Toradol x1  o Maintenance IV fluids with LR  o Other: Benadryl PO and Zofran 8 mg IV PRN itching or nausea    Inpatient:  o Opioid: Morphine 2 mg IV Q1H PRN until PCA starts  o PCA plan:   - PCA button dose: Morphine 1 mg  - Lockout: 20 minutes  - Continuous Infusion: consider 1 mg/hr  - One hr max: 4 mg  o Other Medications: Benadryl, Zofran  o If PCA not working, she Has had success with ketamine starting at 4mg/h and advancing only to 6mg/h, as 8mg/h made her feel quite poorly.  o Continue ASA (ok to give celebrex)--discuss Toradol with hematology first  o Supportive Care: Docusate, Senna  Chronic Pain Medications:    Home regimen  OxyCONTIN 10 mg po q 12 hrs and oxycodone 10 mg p.o. q.6 hours p.r.n. breakthrough pain.  She also continues on Celebrex, and Zoloft among other medications.    -Also benefits from mental health visits, acupuncture  Baseline Hemoglobin: 9.5 g/dL (on chronic transfusions), 7-8 without  Hydroxyurea use: Yes  H/O blood transfusions: Yes, several (iron overload) Most recent 5/22/20    H/O Transfusion Reactions: no    Antibodies:none  H/O Acute Chest Syndrome: Yes    Last episode: 10/2019     ICU/intubation: unsure  H/O Stroke: Yes (managed with chronic transfusions in the past)  H/O VTE: no  H/O Cholecystectomy or Splenectomy: no  H/O Asthma, Pulm HTN, AVN, Leg Ulcers, Nephropathy, Retinopathy, etc: Iron overload, asthma, chronic lung disease, developmental delay from early stroke    -------------------------------------------    Sickle Cell Disease Comprehensive Checklist    Bone Health/Avascular Necrosis Screening/Symptoms (each visit): no new concerns today    Leg Ulcer evaluation (every  visit): none    Hypertension (every visit): mildly hypertensive today, unclear whether it is anxiety about being in clinic or more than that    Last ophthalmologic exam: 7/29/20    Last urinalysis for microalbuminuria/CKD (annually): within last year    Last pulmonary evaluation (asthma, AMAN, pulm HTN): within last year    Stroke/silent cerebral infarct Hx (Y/N): Yes TIA ~2014, first event ~age 1 with full stroke and R sided weakness    Last PCP Visit: 8/2020    Vaccines:  o PCV13: 5/13/19  o Pneumovax (PPSV23): 3/04, 10/09, 7/12/19 (next due 7/2024)  o Menactra: 4/2010, 9/2015 (MCV due Sept 2020)  o Influenza: got 19-20 vaccine    Audiology (chelation): done 6/2020, normal    Past Medical History:  Past Medical History:   Diagnosis Date     Anemia      Anxiety      Bleeding disorder (H)      Blood clotting disorder (H)      Cerebral infarction (H) 2015     Cognitive developmental delay     low IQ. 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 (H)     right hand contractures     Iron overload due to repeated red blood cell transfusions      Migraines      Oppositional defiant behavior      Uncomplicated asthma        Past Surgical History:  Past Surgical History:   Procedure Laterality Date     AS INSERT TUNNELED CV 2 CATH W/O PORT/PUMP       C BREAST REDUCTION (INCLUDES LIPO) TIER 3 Bilateral 04/23/2019     IR CVC NON TUNNEL PLACEMENT  4/7/2020     REPAIR TENDON ELBOW Right 10/2/2019    Procedure: Right Elbow Flexor Lengthening, Flexor Pronator Slide Of Wrist and Finger, Thumb Adductor Lengthening;  Surgeon: Anai Franco MD;  [facility]     TONSILLECTOMY Bilateral 10/2/2019    Procedure: Bilateral Tonsillectomy;  Surgeon: Farhana Guy MD;  [facility]: [facility] OR       Family History:   Family History   Problem Relation Age of Onset     Sickle Cell Trait Mother       "Sickle Cell Trait Father        Social History:  Social History     Socioeconomic History     Marital status: Single     Spouse name: Not on file     Number of children: Not on file     Years of education: Not on file     Highest education level: Not on file   Occupational History     Not on file   Social Needs     Financial resource strain: Not on file     Food insecurity     Worry: Not on file     Inability: Not on file     Transportation needs     Medical: Not on file     Non-medical: Not on file   Tobacco Use     Smoking status: Never Smoker     Smokeless tobacco: Never Used   Substance and Sexual Activity     Alcohol use: Not Currently     Alcohol/week: 0.0 standard drinks     Drug use: Never     Sexual activity: Not Currently     Partners: Male     Birth control/protection: Other   Lifestyle     Physical activity     Days per week: Not on file     Minutes per session: Not on file     Stress: Not on file   Relationships     Social connections     Talks on phone: Not on file     Gets together: Not on file     Attends Congregational service: Not on file     Active member of club or organization: Not on file     Attends meetings of clubs or organizations: Not on file     Relationship status: Not on file     Intimate partner violence     Fear of current or ex partner: Not on file     Emotionally abused: Not on file     Physically abused: Not on file     Forced sexual activity: Not on file   Other Topics Concern     Parent/sibling w/ CABG, MI or angioplasty before 65F 55M? Not Asked   Social History Narrative    Lives with mom and extended family but \"toxic environment\" per her report. She would like to move out but cannot financially do so. She has minimal support at home despite her significant SCD comorbidities and cognitive delay from stroke.       Medications:  Current Outpatient Medications   Medication     acetaminophen (TYLENOL) 325 MG tablet     albuterol (PROVENTIL) (2.5 MG/3ML) 0.083% neb solution     aspirin " "(ASPIRIN) 81 MG EC tablet     Budesonide-Formoterol Fumarate (SYMBICORT IN)     celecoxib (CELEBREX) 100 MG capsule     diphenhydrAMINE (BENADRYL) 25 MG capsule     GNP SENNA-LAX 8.6 MG tablet     Hydroxyurea 1000 MG TABS     ibuprofen (ADVIL/MOTRIN) 200 MG tablet     JADENU 360 MG tablet     medroxyPROGESTERone (DEPO-PROVERA) 150 MG/ML IM injection     naloxone (NARCAN) 4 MG/0.1ML nasal spray     ondansetron (ZOFRAN) 8 MG tablet     oxyCODONE IR (ROXICODONE) 10 MG tablet     OXYCONTIN 10 MG 12 hr tablet     sertraline (ZOLOFT) 100 MG tablet     No current facility-administered medications for this visit.          Physical Exam:   BP (!) 137/90 (BP Location: Left arm, Patient Position: Sitting, Cuff Size: Adult Regular)   Pulse 92   Temp 97.1  F (36.2  C) (Tympanic)   Resp 16   Ht 1.626 m (5' 4\")   Wt 71.8 kg (158 lb 6.4 oz)   SpO2 96%   BMI 27.19 kg/m      GEN: young  female, reserved but seeming to be in pretty good spirits today  HEENT: full head of hair, no icterus, MMM  NECK: no adenopathy  CV: RRR, no murmurs  RESP: CTA bilaterally, no wheezing  MSK: brace on right wrist and right ankle (AFO)  NEURO: alert and oriented, stable contracture of right wrist. Gait stable with AFO  SKIN: port site c/d/i.     Labs:   No new labs today    Imaging: none today    Assessment:  Jennifer Cervantes is a 20 year old female with HbSS. Her disease has been complicated by stroke leading to significant cognitive delay and right sided hemiparesis, high anxiety leading to frequent pain crises on top of chronic pain syndrome, poor social structure at home with no real support, and iron overload secondary to repeated transfusions.     Her pain seems better controlled today and she seems to be in good spirits, though that can wax and wane. She said her support at home is still quite limited. She also mentioned she stays at home most of the time, even pre-pandemic, because her friend [place] is quite limited. She said " "she is fine with that for now, and says it is because \"she is mean so no one wants to be friends\". We discussed that if she does have needs or concerns, she can call our team and we help her out. .     She had recent refills of most of her medications and does not need any opioids at this time. I am sending a new Rx for a nebulizer per her request, and our nurse care coordinator will help with the calling for this next week.      I spent a total of 20 minutes face-to-face with Jennifer Cervantes during today's office visit. See note for details.    Eric Duncan MD  Hematologist  Division of Hematology, Oncology, and Transplantation  [facility]  [facility]  Pager: [phone]      "

## 2020-11-10 ENCOUNTER — PATIENT OUTREACH (OUTPATIENT)
Dept: ONCOLOGY | Facility: CLINIC | Age: 20
End: 2020-11-10

## 2020-11-10 NOTE — PROGRESS NOTES
Writer placed call to [facility] to request update on order for nebulizer and supplies placed by Dr Duncan on 11/6. Unable to speak with representative, left voicemail with call back information.

## 2020-11-11 ENCOUNTER — TELEPHONE (OUTPATIENT)
Dept: ONCOLOGY | Facility: CLINIC | Age: 20
End: 2020-11-11

## 2020-11-11 ENCOUNTER — INFUSION THERAPY VISIT (OUTPATIENT)
Dept: INFUSION THERAPY | Facility: CLINIC | Age: 20
End: 2020-11-11
Attending: PEDIATRICS
Payer: COMMERCIAL

## 2020-11-11 VITALS — HEART RATE: 125 BPM | SYSTOLIC BLOOD PRESSURE: 137 MMHG | DIASTOLIC BLOOD PRESSURE: 77 MMHG | OXYGEN SATURATION: 91 %

## 2020-11-11 DIAGNOSIS — D57.00 SICKLE CELL PAIN CRISIS (H): Primary | ICD-10-CM

## 2020-11-11 PROCEDURE — 96374 THER/PROPH/DIAG INJ IV PUSH: CPT

## 2020-11-11 PROCEDURE — 250N000011 HC RX IP 250 OP 636: Performed by: PEDIATRICS

## 2020-11-11 PROCEDURE — 250N000011 HC RX IP 250 OP 636: Performed by: PHYSICIAN ASSISTANT

## 2020-11-11 PROCEDURE — 258N000003 HC RX IP 258 OP 636: Performed by: PHYSICIAN ASSISTANT

## 2020-11-11 PROCEDURE — 96361 HYDRATE IV INFUSION ADD-ON: CPT

## 2020-11-11 PROCEDURE — 96376 TX/PRO/DX INJ SAME DRUG ADON: CPT

## 2020-11-11 RX ORDER — MORPHINE SULFATE 2 MG/ML
2 INJECTION, SOLUTION INTRAMUSCULAR; INTRAVENOUS
Status: DISCONTINUED | OUTPATIENT
Start: 2020-11-11 | End: 2020-11-11 | Stop reason: HOSPADM

## 2020-11-11 RX ORDER — DIPHENHYDRAMINE HCL 25 MG
25 CAPSULE ORAL
Status: CANCELLED
Start: 2020-11-11

## 2020-11-11 RX ORDER — ONDANSETRON 4 MG/1
8 TABLET, ORALLY DISINTEGRATING ORAL
Status: DISCONTINUED | OUTPATIENT
Start: 2020-11-11 | End: 2020-11-11 | Stop reason: HOSPADM

## 2020-11-11 RX ORDER — DIPHENHYDRAMINE HCL 25 MG
25 CAPSULE ORAL
Status: DISCONTINUED | OUTPATIENT
Start: 2020-11-11 | End: 2020-11-11 | Stop reason: HOSPADM

## 2020-11-11 RX ORDER — HEPARIN SODIUM (PORCINE) LOCK FLUSH IV SOLN 100 UNIT/ML 100 UNIT/ML
5 SOLUTION INTRAVENOUS
Status: CANCELLED | OUTPATIENT
Start: 2020-11-11

## 2020-11-11 RX ORDER — ONDANSETRON 8 MG/1
8 TABLET, FILM COATED ORAL
Status: CANCELLED
Start: 2020-11-11

## 2020-11-11 RX ORDER — MORPHINE SULFATE 2 MG/ML
2 INJECTION, SOLUTION INTRAMUSCULAR; INTRAVENOUS
Status: CANCELLED
Start: 2020-11-11

## 2020-11-11 RX ORDER — HEPARIN SODIUM,PORCINE 10 UNIT/ML
5 VIAL (ML) INTRAVENOUS
Status: CANCELLED | OUTPATIENT
Start: 2020-11-11

## 2020-11-11 RX ORDER — HEPARIN SODIUM (PORCINE) LOCK FLUSH IV SOLN 100 UNIT/ML 100 UNIT/ML
5 SOLUTION INTRAVENOUS
Status: DISCONTINUED | OUTPATIENT
Start: 2020-11-11 | End: 2020-11-11 | Stop reason: HOSPADM

## 2020-11-11 RX ADMIN — Medication 5 ML: at 11:49

## 2020-11-11 RX ADMIN — MORPHINE SULFATE 2 MG: 2 INJECTION, SOLUTION INTRAMUSCULAR; INTRAVENOUS at 11:43

## 2020-11-11 RX ADMIN — MORPHINE SULFATE 2 MG: 2 INJECTION, SOLUTION INTRAMUSCULAR; INTRAVENOUS at 10:51

## 2020-11-11 RX ADMIN — DEXTROSE AND SODIUM CHLORIDE 500 ML: 5; 450 INJECTION, SOLUTION INTRAVENOUS at 09:38

## 2020-11-11 RX ADMIN — MORPHINE SULFATE 2 MG: 2 INJECTION, SOLUTION INTRAMUSCULAR; INTRAVENOUS at 09:44

## 2020-11-11 NOTE — LETTER
11/11/2020         RE: Jennifer Cervantes  [address]  [place] [address]        Dear Colleague,    Thank you for referring your patient, Jennifer Cervantes, to the [facility]. Please see a copy of my visit note below.    Nursing Note  Jennifer Cervantes [facility] for:   Chief Complaint   Patient presents with     Infusion     Fluids, pain meds     During today's [facility] appointment, orders from Eric Duncan MD were completed.  Frequency: as needed    Progress note:  Patient identification verified by name and date of birth.  Assessment completed.  Vitals recorded in Doc Flowsheets.  Patient was provided with education regarding medication/procedure and possible side effects.  Patient verbalized understanding.     present during visit today: Not Applicable.    Treatment Conditions: Non-applicable.  Premedications: patient stated she would let me know if she needed zofran or benadryl that is ordered, she did not want either   Drug Waste Record: No  Infusion length and rate:  250 ml/hr., over 2 hours IV fluids, IVP over 2 minutes each morphine  Labs: were not ordered for this appointment.  Vascular access: port accessed today.    Post Infusion Assessment:  Patient tolerated infusion without incident.  Blood return noted pre and post infusion.  Site patent and intact, free from redness, edema or discomfort.  No evidence of extravasations.  Access discontinued per protocol.     Discharge Plan:   Follow up plan of care with: ongoing infusions at [facility].  Discharge instructions were reviewed with patient.  Patient/representative verbalized understanding of discharge instructions and all questions answered.  Patient discharged from [facility] in stable condition.    Roz Arellano RN    Administrations This Visit      dextrose 5% and 0.45% NaCl BOLUS     Admin Date  11/11/2020 Action  New Bag Dose  500 mL Rate  250 mL/hr Route  Intravenous Administered By  Roz Arellano RN          heparin 100 UNIT/ML injection 5 mL     Admin Date  11/11/2020 Action  Given Dose  5 mL Route  Intracatheter Administered By  Roz Arellano RN          morphine (PF) injection 2 mg     Admin Date  11/11/2020 Action  Given Dose  2 mg Route  Intravenous Administered By  Roz Arellano RN           Admin Date  11/11/2020 Action  Given Dose  2 mg Route  Intravenous Administered By  Roz Arellano RN           Admin Date  11/11/2020 Action  Given Dose  2 mg Route  Intravenous Administered By  Roz Arellano RN                /82   Pulse 112   SpO2 91%           Again, thank you for allowing me to participate in the care of your patient.        Sincerely,        [facility]

## 2020-11-11 NOTE — TELEPHONE ENCOUNTER
Pt scheduled for 8:30 am [facility] infusion/labs to be done in [facility] per charge, pt accepted and scheduling messaged.

## 2020-11-11 NOTE — TELEPHONE ENCOUNTER
[facility] yet. Unable to reach by phone. Writer called also and got brennon baptiste for pt to call back if she is on her way and what ETA is, as infusion may not be able to accommodate her today if she arrives any later. Left triage line for call back.

## 2020-11-11 NOTE — PROGRESS NOTES
Nursing Note  Jennifer Cervantes presents [facility] for:   Chief Complaint   Patient presents with     Infusion     Fluids, pain meds     During [facility]'s [facility] appointment, orders from Eric Duncan MD were completed.  Frequency: as needed    Progress note:  Patient identification verified by name and date of birth.  Assessment completed.  Vitals recorded in Doc Flowsheets.  Patient was provided with education regarding medication/procedure and possible side effects.  Patient verbalized understanding.     present during visit today: Not Applicable.    Treatment Conditions: Non-applicable.  Premedications: patient stated she would let me know if she needed zofran or benadryl that is ordered, she did not want either   Drug Waste Record: No  Infusion length and rate:  250 ml/hr., over 2 hours IV fluids, IVP over 2 minutes each morphine  Labs: were not ordered for this appointment.  Vascular access: port accessed today.    Post Infusion Assessment:  Patient tolerated infusion without incident.  Blood return noted pre and post infusion.  Site patent and intact, free from redness, edema or discomfort.  No evidence of extravasations.  Access discontinued per protocol.     Discharge Plan:   Follow up plan of care with: ongoing infusions at [facility].  Discharge instructions were reviewed with patient.  Patient/representative verbalized understanding of discharge instructions and all questions answered.  Patient discharged [facility] in stable condition.    Roz Arellano RN    Administrations This Visit     dextrose 5% and 0.45% NaCl BOLUS     Admin Date  11/11/2020 Action  New Bag Dose  500 mL Rate  250 mL/hr Route  Intravenous Administered By  Roz Arellano RN          heparin 100 UNIT/ML injection 5 mL     Admin Date  11/11/2020 Action  Given Dose  5 mL  Route  Intracatheter Administered By  Roz Arellano, RN          morphine (PF) injection 2 mg     Admin Date  11/11/2020 Action  Given Dose  2 mg Route  Intravenous Administered By  Roz Arellano RN           Admin Date  11/11/2020 Action  Given Dose  2 mg Route  Intravenous Administered By  Roz Arellano, JOE           Admin Date  11/11/2020 Action  Given Dose  2 mg Route  Intravenous Administered By  Roz Arellano, RN                /82   Pulse 112   SpO2 91%

## 2020-11-11 NOTE — TELEPHONE ENCOUNTER
Pt called in to triage requesting IVF pain meds for back pain pain rated 10 x2-3 days. Stated last took prn oxycodone, scheduled oxycontin, ibuprofen and daily hydrea, celebrex at 6am  this morning without relief. Denied any fevers, chills, cough, sob, chest pain or other symptoms. Pt's last infusion was 11/3, last clinic visit 11/6 with follow-up not yet scheduled. Pt denied being out of home medications and needing any refills today. Pt qualifies for sickle cell standing order protocol.

## 2020-11-12 ENCOUNTER — PATIENT OUTREACH (OUTPATIENT)
Dept: ONCOLOGY | Facility: CLINIC | Age: 20
End: 2020-11-12

## 2020-11-12 NOTE — PROGRESS NOTES
Spoke with Jennifer today whom stated she has not yet been contacted regarding a nebulizer and supplies.    Phone [facility] whom stated they do not have any orders from 11/6/2020.      Faxed to [facility] at  [phone]  Face sheet/ order/ and office notes

## 2020-11-16 ENCOUNTER — PATIENT OUTREACH (OUTPATIENT)
Dept: ONCOLOGY | Facility: CLINIC | Age: 20
End: 2020-11-16

## 2020-11-16 ENCOUNTER — HEALTH MAINTENANCE LETTER (OUTPATIENT)
Age: 20
End: 2020-11-16

## 2020-11-16 ENCOUNTER — TELEPHONE (OUTPATIENT)
Dept: ONCOLOGY | Facility: CLINIC | Age: 20
End: 2020-11-16

## 2020-11-16 ENCOUNTER — INFUSION THERAPY VISIT (OUTPATIENT)
Dept: ONCOLOGY | Facility: CLINIC | Age: 20
End: 2020-11-16
Attending: PEDIATRICS
Payer: COMMERCIAL

## 2020-11-16 VITALS
TEMPERATURE: 98.1 F | DIASTOLIC BLOOD PRESSURE: 84 MMHG | HEART RATE: 109 BPM | OXYGEN SATURATION: 95 % | RESPIRATION RATE: 20 BRPM | SYSTOLIC BLOOD PRESSURE: 135 MMHG

## 2020-11-16 DIAGNOSIS — D57.00 SICKLE CELL PAIN CRISIS (H): Primary | ICD-10-CM

## 2020-11-16 DIAGNOSIS — G47.00 INSOMNIA, UNSPECIFIED TYPE: ICD-10-CM

## 2020-11-16 DIAGNOSIS — D57.1 HB-SS DISEASE WITHOUT CRISIS (H): Primary | ICD-10-CM

## 2020-11-16 PROCEDURE — 250N000011 HC RX IP 250 OP 636: Performed by: PEDIATRICS

## 2020-11-16 PROCEDURE — 250N000011 HC RX IP 250 OP 636: Performed by: PHYSICIAN ASSISTANT

## 2020-11-16 PROCEDURE — 96376 TX/PRO/DX INJ SAME DRUG ADON: CPT

## 2020-11-16 PROCEDURE — 258N000003 HC RX IP 258 OP 636: Performed by: PHYSICIAN ASSISTANT

## 2020-11-16 PROCEDURE — 96361 HYDRATE IV INFUSION ADD-ON: CPT

## 2020-11-16 PROCEDURE — 96374 THER/PROPH/DIAG INJ IV PUSH: CPT

## 2020-11-16 RX ORDER — HEPARIN SODIUM (PORCINE) LOCK FLUSH IV SOLN 100 UNIT/ML 100 UNIT/ML
5 SOLUTION INTRAVENOUS
Status: DISCONTINUED | OUTPATIENT
Start: 2020-11-16 | End: 2020-11-16 | Stop reason: HOSPADM

## 2020-11-16 RX ORDER — HEPARIN SODIUM,PORCINE 10 UNIT/ML
5 VIAL (ML) INTRAVENOUS
Status: CANCELLED | OUTPATIENT
Start: 2020-11-16

## 2020-11-16 RX ORDER — ONDANSETRON 8 MG/1
8 TABLET, ORALLY DISINTEGRATING ORAL
Status: DISCONTINUED | OUTPATIENT
Start: 2020-11-16 | End: 2020-11-16 | Stop reason: HOSPADM

## 2020-11-16 RX ORDER — DIPHENHYDRAMINE HCL 25 MG
25 CAPSULE ORAL
Status: DISCONTINUED | OUTPATIENT
Start: 2020-11-16 | End: 2020-11-16 | Stop reason: HOSPADM

## 2020-11-16 RX ORDER — ONDANSETRON 8 MG/1
8 TABLET, FILM COATED ORAL
Status: CANCELLED
Start: 2020-11-16

## 2020-11-16 RX ORDER — HEPARIN SODIUM (PORCINE) LOCK FLUSH IV SOLN 100 UNIT/ML 100 UNIT/ML
5 SOLUTION INTRAVENOUS
Status: CANCELLED | OUTPATIENT
Start: 2020-11-16

## 2020-11-16 RX ORDER — MORPHINE SULFATE 2 MG/ML
2 INJECTION, SOLUTION INTRAMUSCULAR; INTRAVENOUS
Status: DISCONTINUED | OUTPATIENT
Start: 2020-11-16 | End: 2020-11-16 | Stop reason: HOSPADM

## 2020-11-16 RX ORDER — MORPHINE SULFATE 2 MG/ML
2 INJECTION, SOLUTION INTRAMUSCULAR; INTRAVENOUS
Status: CANCELLED
Start: 2020-11-16

## 2020-11-16 RX ORDER — DIPHENHYDRAMINE HCL 25 MG
25 CAPSULE ORAL
Status: CANCELLED
Start: 2020-11-16

## 2020-11-16 RX ORDER — DIPHENHYDRAMINE HCL 25 MG
25-50 CAPSULE ORAL
Qty: 60 CAPSULE | Refills: 3 | Status: SHIPPED | OUTPATIENT
Start: 2020-11-16 | End: 2020-12-23

## 2020-11-16 RX ADMIN — MORPHINE SULFATE 2 MG: 2 INJECTION, SOLUTION INTRAMUSCULAR; INTRAVENOUS at 12:02

## 2020-11-16 RX ADMIN — MORPHINE SULFATE 2 MG: 2 INJECTION, SOLUTION INTRAMUSCULAR; INTRAVENOUS at 11:01

## 2020-11-16 RX ADMIN — DEXTROSE AND SODIUM CHLORIDE 500 ML: 5; 450 INJECTION, SOLUTION INTRAVENOUS at 11:00

## 2020-11-16 RX ADMIN — Medication 5 ML: at 13:13

## 2020-11-16 RX ADMIN — MORPHINE SULFATE 2 MG: 2 INJECTION, SOLUTION INTRAMUSCULAR; INTRAVENOUS at 13:02

## 2020-11-16 ASSESSMENT — PAIN SCALES - GENERAL: PAINLEVEL: EXTREME PAIN (9)

## 2020-11-16 NOTE — PROGRESS NOTES
Writer placed call to Jennifer in response to voicemail left. Jennifer is frustrated as she believes her most recent oxycodone prescription filled on 11/2/2020 for 60 tablets was to last 2 weeks. She feels confused by her care plan and wants to stop coming to clinic. Writer advised that I would discuss with Dr Duncan and get back to her as her recent history of have been inconsistent. I advised Jennifer to keep her scheduled appointments for Thursday labs and Friday virtual visit with Dr Duncan and that I would call her back once I have figured out what is happening with her refill. I also stated that if she did not feel like talking, as she was audibly upset, it would be ok to let the call go to  and I would leave her a message and she can call back when feeling more open to communication. Jennifer thanked writer and call was ended.

## 2020-11-16 NOTE — TELEPHONE ENCOUNTER
[facility] Telephone Triage Note    The following symptoms were reported:     Description:            Onset:  Chronic, worse beginning yesterday evening   Location: all over  Character: Sharp           Intensity: severe    Accompanying Signs & Symptoms:  none    Chest Pain:  denies     Shortness of Breath:  denies     Fever:  denies     Chills:  denies   Cough/sore throat:  denies  Other:  denies    Therapies Tried and outcome: Increased oxycodone from 1 every 6 hours to 1 every 4 hours without sufficient relief    Improved by: nothing    The following provider was consulted:  Qualifies for protocol     The following advice/orders were given, and/or interventions recommended:  Per Lina, able to add to [facility] at 1030, Jennifer aware and will come to clinic.      Patient instructions and/or follow up:  Jennifer accepted appointment and also requests refill of oxycodone and benadryl. Per Dr Duncan, Jennifer had a refill of oxycodone on 11/2/2020 and this was to last her 1 month. Refilled Benadryl to [facility] pharmacy and left VM for Jennifer regarding need for clinic visit on Friday, virtually, with Dr Duncan at 1:00 pm to discuss oxycodone refill.

## 2020-11-16 NOTE — PROGRESS NOTES
Infusion Nursing Note:  Jennifer Cervantes presents today for IVF and pain meds.    Patient seen by provider today: No   present during visit today: Not Applicable.    Note: Pt assessed upon arrival to infusion suite. Denies fever, chills, cough, SOB or other signs/symptoms of infection. Pt rating generalized pain 9/10 on arrival. After 3 doses of IV morphine, pain decreased to 7/10. Pt states she feels comfortable going home and managing pain at home.     Intravenous Access:  Implanted Port.    Post Infusion Assessment:  Patient tolerated infusion without incident.  Blood return noted pre and post infusion.  Site patent and intact, free from redness, edema or discomfort.  No evidence of extravasations.  Access discontinued per protocol.     Discharge Plan:   Prescription refills given for Benadryl.  AVS to patient via playnikT.  Patient will call for appointments as needed  Patient discharged in stable condition accompanied by: self.  Departure Mode: Ambulatory.    Farhana Dias RN

## 2020-11-17 ENCOUNTER — INFUSION THERAPY VISIT (OUTPATIENT)
Dept: ONCOLOGY | Facility: CLINIC | Age: 20
End: 2020-11-17
Attending: PEDIATRICS
Payer: COMMERCIAL

## 2020-11-17 ENCOUNTER — TELEPHONE (OUTPATIENT)
Dept: ONCOLOGY | Facility: CLINIC | Age: 20
End: 2020-11-17

## 2020-11-17 ENCOUNTER — PATIENT OUTREACH (OUTPATIENT)
Dept: ONCOLOGY | Facility: CLINIC | Age: 20
End: 2020-11-17

## 2020-11-17 ENCOUNTER — APPOINTMENT (OUTPATIENT)
Dept: LAB | Facility: CLINIC | Age: 20
End: 2020-11-17
Attending: PEDIATRICS
Payer: COMMERCIAL

## 2020-11-17 VITALS
RESPIRATION RATE: 16 BRPM | OXYGEN SATURATION: 90 % | DIASTOLIC BLOOD PRESSURE: 85 MMHG | WEIGHT: 159 LBS | SYSTOLIC BLOOD PRESSURE: 132 MMHG | TEMPERATURE: 98.1 F | HEART RATE: 108 BPM | BODY MASS INDEX: 27.29 KG/M2

## 2020-11-17 DIAGNOSIS — D57.1 HB-SS DISEASE WITHOUT CRISIS (H): Primary | ICD-10-CM

## 2020-11-17 DIAGNOSIS — D57.00 SICKLE CELL PAIN CRISIS (H): ICD-10-CM

## 2020-11-17 LAB
ALBUMIN SERPL-MCNC: 4 G/DL (ref 3.4–5)
ALP SERPL-CCNC: 71 U/L (ref 40–150)
ALT SERPL W P-5'-P-CCNC: 88 U/L (ref 0–50)
ANION GAP SERPL CALCULATED.3IONS-SCNC: 8 MMOL/L (ref 3–14)
ANISOCYTOSIS BLD QL SMEAR: ABNORMAL
AST SERPL W P-5'-P-CCNC: 106 U/L (ref 0–45)
BASOPHILS # BLD AUTO: 0.4 10E9/L (ref 0–0.2)
BASOPHILS NFR BLD AUTO: 3.5 %
BILIRUB SERPL-MCNC: 4 MG/DL (ref 0.2–1.3)
BUN SERPL-MCNC: 9 MG/DL (ref 7–30)
CALCIUM SERPL-MCNC: 8.8 MG/DL (ref 8.5–10.1)
CHLORIDE SERPL-SCNC: 109 MMOL/L (ref 94–109)
CO2 SERPL-SCNC: 21 MMOL/L (ref 20–32)
CREAT SERPL-MCNC: 0.58 MG/DL (ref 0.52–1.04)
DIFFERENTIAL METHOD BLD: ABNORMAL
EOSINOPHIL # BLD AUTO: 0.5 10E9/L (ref 0–0.7)
EOSINOPHIL NFR BLD AUTO: 4.3 %
ERYTHROCYTE [DISTWIDTH] IN BLOOD BY AUTOMATED COUNT: 24.1 % (ref 10–15)
FERRITIN SERPL-MCNC: ABNORMAL NG/ML (ref 12–150)
GFR SERPL CREATININE-BSD FRML MDRD: >90 ML/MIN/{1.73_M2}
GLUCOSE SERPL-MCNC: 103 MG/DL (ref 70–99)
HCT VFR BLD AUTO: 21.7 % (ref 35–47)
HGB BLD-MCNC: 7.6 G/DL (ref 11.7–15.7)
LYMPHOCYTES # BLD AUTO: 2.2 10E9/L (ref 0.8–5.3)
LYMPHOCYTES NFR BLD AUTO: 19.1 %
MCH RBC QN AUTO: 32.5 PG (ref 26.5–33)
MCHC RBC AUTO-ENTMCNC: 35 G/DL (ref 31.5–36.5)
MCV RBC AUTO: 93 FL (ref 78–100)
MONOCYTES # BLD AUTO: 0.6 10E9/L (ref 0–1.3)
MONOCYTES NFR BLD AUTO: 5.2 %
NEUTROPHILS # BLD AUTO: 7.7 10E9/L (ref 1.6–8.3)
NEUTROPHILS NFR BLD AUTO: 67.9 %
NRBC # BLD AUTO: 2 10*3/UL
NRBC BLD AUTO-RTO: 17 /100
PLATELET # BLD AUTO: 279 10E9/L (ref 150–450)
PLATELET # BLD EST: ABNORMAL 10*3/UL
POIKILOCYTOSIS BLD QL SMEAR: ABNORMAL
POLYCHROMASIA BLD QL SMEAR: ABNORMAL
POTASSIUM SERPL-SCNC: 3.6 MMOL/L (ref 3.4–5.3)
PROT SERPL-MCNC: 7.8 G/DL (ref 6.8–8.8)
RBC # BLD AUTO: 2.34 10E12/L (ref 3.8–5.2)
RETICS # AUTO: 554.8 10E9/L (ref 25–95)
RETICS/RBC NFR AUTO: ABNORMAL % (ref 0.5–2)
SICKLE CELLS BLD QL SMEAR: ABNORMAL
SODIUM SERPL-SCNC: 138 MMOL/L (ref 133–144)
TARGETS BLD QL SMEAR: ABNORMAL
WBC # BLD AUTO: 11.3 10E9/L (ref 4–11)

## 2020-11-17 PROCEDURE — 85045 AUTOMATED RETICULOCYTE COUNT: CPT | Performed by: PEDIATRICS

## 2020-11-17 PROCEDURE — 250N000011 HC RX IP 250 OP 636: Performed by: PHYSICIAN ASSISTANT

## 2020-11-17 PROCEDURE — 82728 ASSAY OF FERRITIN: CPT | Performed by: PEDIATRICS

## 2020-11-17 PROCEDURE — 96361 HYDRATE IV INFUSION ADD-ON: CPT

## 2020-11-17 PROCEDURE — 96376 TX/PRO/DX INJ SAME DRUG ADON: CPT

## 2020-11-17 PROCEDURE — 250N000011 HC RX IP 250 OP 636: Performed by: PEDIATRICS

## 2020-11-17 PROCEDURE — 96374 THER/PROPH/DIAG INJ IV PUSH: CPT

## 2020-11-17 PROCEDURE — 80053 COMPREHEN METABOLIC PANEL: CPT | Performed by: PEDIATRICS

## 2020-11-17 PROCEDURE — 258N000003 HC RX IP 258 OP 636: Performed by: PHYSICIAN ASSISTANT

## 2020-11-17 PROCEDURE — 85025 COMPLETE CBC W/AUTO DIFF WBC: CPT | Performed by: PEDIATRICS

## 2020-11-17 RX ORDER — HEPARIN SODIUM (PORCINE) LOCK FLUSH IV SOLN 100 UNIT/ML 100 UNIT/ML
5 SOLUTION INTRAVENOUS
Status: CANCELLED | OUTPATIENT
Start: 2020-11-17

## 2020-11-17 RX ORDER — HEPARIN SODIUM (PORCINE) LOCK FLUSH IV SOLN 100 UNIT/ML 100 UNIT/ML
5 SOLUTION INTRAVENOUS
Status: DISCONTINUED | OUTPATIENT
Start: 2020-11-17 | End: 2020-11-17 | Stop reason: HOSPADM

## 2020-11-17 RX ORDER — MORPHINE SULFATE 2 MG/ML
2 INJECTION, SOLUTION INTRAMUSCULAR; INTRAVENOUS
Status: COMPLETED | OUTPATIENT
Start: 2020-11-17 | End: 2020-11-17

## 2020-11-17 RX ORDER — HEPARIN SODIUM,PORCINE 10 UNIT/ML
5 VIAL (ML) INTRAVENOUS
Status: CANCELLED | OUTPATIENT
Start: 2020-11-17

## 2020-11-17 RX ORDER — DIPHENHYDRAMINE HCL 25 MG
25 CAPSULE ORAL
Status: CANCELLED
Start: 2020-11-17

## 2020-11-17 RX ORDER — HEPARIN SODIUM (PORCINE) LOCK FLUSH IV SOLN 100 UNIT/ML 100 UNIT/ML
5 SOLUTION INTRAVENOUS EVERY 8 HOURS PRN
Status: DISCONTINUED | OUTPATIENT
Start: 2020-11-17 | End: 2020-11-17 | Stop reason: HOSPADM

## 2020-11-17 RX ORDER — ONDANSETRON 8 MG/1
8 TABLET, FILM COATED ORAL
Status: CANCELLED
Start: 2020-11-17

## 2020-11-17 RX ORDER — MORPHINE SULFATE 2 MG/ML
2 INJECTION, SOLUTION INTRAMUSCULAR; INTRAVENOUS
Status: CANCELLED
Start: 2020-11-17

## 2020-11-17 RX ADMIN — Medication 5 ML: at 11:31

## 2020-11-17 RX ADMIN — DEXTROSE AND SODIUM CHLORIDE 500 ML: 5; 450 INJECTION, SOLUTION INTRAVENOUS at 09:26

## 2020-11-17 RX ADMIN — Medication 5 ML: at 09:03

## 2020-11-17 RX ADMIN — MORPHINE SULFATE 2 MG: 2 INJECTION, SOLUTION INTRAMUSCULAR; INTRAVENOUS at 10:27

## 2020-11-17 RX ADMIN — MORPHINE SULFATE 2 MG: 2 INJECTION, SOLUTION INTRAMUSCULAR; INTRAVENOUS at 11:27

## 2020-11-17 RX ADMIN — MORPHINE SULFATE 2 MG: 2 INJECTION, SOLUTION INTRAMUSCULAR; INTRAVENOUS at 09:27

## 2020-11-17 ASSESSMENT — PAIN SCALES - GENERAL: PAINLEVEL: WORST PAIN (10)

## 2020-11-17 NOTE — PATIENT INSTRUCTIONS
[facility] Main Line: [phone]    Call triage nurse with chills and/or temperature greater than or equal to 100.4, uncontrolled nausea/vomiting, diarrhea, constipation, dizziness, shortness of breath, chest pain, bleeding, unexplained bruising, or any new/concerning symptoms, questions/concerns.   If you are having any concerning symptoms or wish to speak to a provider before your next infusion visit, please call your care coordinator or triage to notify them so we can adequately serve you.   Nurse Triage line:  [phone]    If after hours, weekends, or holidays, call main hospital  and ask for Oncology doctor on call @ [phone]      November 2020 Sunday Monday Tuesday Wednesday Thursday Friday Saturday   1     2     3    LAB PERIPHERAL  12:00 PM   (15 min.)   [facility] D[facility]   [facility]    [facility] SPEC INFUSION 120  12:30 PM   (120 min.)   [facility] INFUSION   [facility] 4     5     6    [facility] RETURN   1:15 PM   (30 min.)   Eric Duncan MD M [facility] 7       8     9     10     11    [facility] SPEC INFUSION 120   8:30 AM   (120 min.)   [facility] INFUSION   [facility]    NEURO TREATMENT  10:00 AM   (45 min.)   Rony Delacruz, PT   [facility] 12     13     14       15     16    [facility] ONC INFUSION 120  10:30 AM   (120 min.)   [facility]   [facility] 17    [facility] DRAW   8:30 AM   (15 min.)   [facility] DRAW   [facility]    [facility] ONC INFUSION 120   8:30 AM   (120 min.)   [facility] ONCOLOGY INFUSION   [facility] 18     19    [facility] DRAW   1:15 PM   (15 min.)   [facility] D[facility]   [facility] 20    VIDEO VISIT RETURN  12:45 PM   (30 min.)   Eric Duncan MD M  [facility] 21       22     23     24     25     26     27     28       29     30 December 2020 Sunday Monday Tuesday Wednesday Thursday Friday Saturday             1     2     3    LAB CENTRAL   1:15 PM   (15 min.)    [facility] DRAW   M [facility] 4    [facility] RETURN   2:25 PM   (50 min.)   Cornelia Suresh PA-C M [facility] 5       6     7     8     9     10     11     12       13     14     15     16     17     18     19       20     21     22     23     24     25     26       27     28     29     30     31                               Lab Results:  Recent Results (from the past 12 hour(s))   *CBC with platelets differential    Collection Time: 11/17/20  9:04 AM   Result Value Ref Range    WBC 11.3 (H) 4.0 - 11.0 10e9/L    RBC Count 2.34 (L) 3.8 - 5.2 10e12/L    Hemoglobin 7.6 (L) 11.7 - 15.7 g/dL    Hematocrit 21.7 (L) 35.0 - 47.0 %    MCV 93 78 - 100 fl    MCH 32.5 26.5 - 33.0 pg    MCHC 35.0 31.5 - 36.5 g/dL    RDW 24.1 (H) 10.0 - 15.0 %    Platelet Count 279 150 - 450 10e9/L    Diff Method Manual Differential     % Neutrophils 67.9 %    % Lymphocytes 19.1 %    % Monocytes 5.2 %    % Eosinophils 4.3 %    % Basophils 3.5 %    Nucleated RBCs 17 (H) 0 /100    Absolute Neutrophil 7.7 1.6 - 8.3 10e9/L    Absolute Lymphocytes 2.2 0.8 - 5.3 10e9/L    Absolute Monocytes 0.6 0.0 - 1.3 10e9/L    Absolute Eosinophils 0.5 0.0 - 0.7 10e9/L    Absolute Basophils 0.4 (H) 0.0 - 0.2 10e9/L    Absolute Nucleated RBC 2.0     Anisocytosis Marked     Poikilocytosis Marked     Polychromasia Marked     Sickle Cells Marked     Target Cells Marked     Platelet Estimate Confirming automated cell count    Comprehensive metabolic panel    Collection Time: 11/17/20  9:04 AM   Result Value Ref Range    Sodium 138 133 - 144 mmol/L    Potassium 3.6 3.4 - 5.3 mmol/L    Chloride 109 94 - 109 mmol/L    Carbon Dioxide  21 20 - 32 mmol/L    Anion Gap 8 3 - 14 mmol/L    Glucose 103 (H) 70 - 99 mg/dL    Urea Nitrogen 9 7 - 30 mg/dL    Creatinine 0.58 0.52 - 1.04 mg/dL    GFR Estimate >90 >60 mL/min/[1.73_m2]    GFR Estimate If Black >90 >60 mL/min/[1.73_m2]    Calcium 8.8 8.5 - 10.1 mg/dL    Bilirubin Total 4.0 (H) 0.2 - 1.3 mg/dL    Albumin 4.0 3.4 - 5.0 g/dL    Protein Total 7.8 6.8 - 8.8 g/dL    Alkaline Phosphatase 71 40 - 150 U/L    ALT 88 (H) 0 - 50 U/L     (H) 0 - 45 U/L   Reticulocyte count    Collection Time: 11/17/20  9:04 AM   Result Value Ref Range    % Retic  0.5 - 2.0 %     Interfering substance present that may interfere with reticulocyte quantification.   Recommend peripheral smear for pathologist review if clinically indicated      Absolute Retic 554.8 (H) 25 - 95 10e9/L

## 2020-11-17 NOTE — TELEPHONE ENCOUNTER
[facility] Telephone Triage Note    The following symptoms were reported:     Description:            Onset:  2 days ago   Location:back  Character: Sharp           Intensity: 8/10    Accompanying Signs & Symptoms:  none    Chest Pain:  denies     Shortness of Breath:  denies     Fever:  denies     Chills:  denies   Cough/sore throat:  denies  Other:  denies    Therapies Tried and outcome: infusion yesterday and home meds    Improved by: Infusion helped yesterday for all over body pain        The following provider was consulted:    The following advice/orders were given, and/or interventions recommended:  Dr Duncan paged at  0755. Ok for infusion. Message sent to scheduling for 0830 lab and infusion per JOE Storey    Patient instructions and/or follow up:  Pt notified

## 2020-11-17 NOTE — NURSING NOTE
Chief Complaint   Patient presents with     Port Draw     Labs drawn via PORT by RN in lab. VS taken.      Kaveh Ellsworth RN

## 2020-11-17 NOTE — PROGRESS NOTES
"Writer placed call to Jennifer, who was scheduled for IVF/pain meds for 0830. In an effort to save a trip into clinic on Thursday for labs, writer messaged Buster Mccormick to add labs on. I discussed this with Jennifer by phone, who had not left her home yet, and she expressed concerns over why we ask her to have labs drawn \"all the time\" but \"don't tell me why\". I advised Jennifer that these labs tell us how her sickle cell disease is reacting, what her iron levels look like, and give us insight into her overall health with respect to her sickle cell disease and is our current plan of care working. Jennifer became frustrated, and stated \"You know what Julieta, bysandra\" and call was abruptly disconnected.  "

## 2020-11-20 ENCOUNTER — VIRTUAL VISIT (OUTPATIENT)
Dept: ONCOLOGY | Facility: CLINIC | Age: 20
End: 2020-11-20
Attending: PEDIATRICS
Payer: COMMERCIAL

## 2020-11-20 DIAGNOSIS — D57.1 HB-SS DISEASE WITHOUT CRISIS (H): Primary | ICD-10-CM

## 2020-11-20 DIAGNOSIS — G89.4 CHRONIC PAIN SYNDROME: ICD-10-CM

## 2020-11-20 DIAGNOSIS — E83.118 HEMOCHROMATOSIS SECONDARY TO ERYTHROPOIETIC DISORDER: ICD-10-CM

## 2020-11-20 DIAGNOSIS — E83.111 IRON OVERLOAD DUE TO REPEATED RED BLOOD CELL TRANSFUSIONS: ICD-10-CM

## 2020-11-20 DIAGNOSIS — D57.00 SICKLE CELL CRISIS (H): ICD-10-CM

## 2020-11-20 PROCEDURE — 99215 OFFICE O/P EST HI 40 MIN: CPT | Mod: GT | Performed by: PEDIATRICS

## 2020-11-20 PROCEDURE — 999N001193 HC VIDEO/TELEPHONE VISIT; NO CHARGE

## 2020-11-20 RX ORDER — ACETAMINOPHEN 325 MG/1
650 TABLET ORAL EVERY 6 HOURS PRN
Qty: 120 TABLET | Refills: 3 | Status: SHIPPED | OUTPATIENT
Start: 2020-11-20 | End: 2021-03-26

## 2020-11-20 RX ORDER — OXYCODONE HYDROCHLORIDE 10 MG/1
TABLET ORAL
Qty: 60 TABLET | Refills: 0 | Status: SHIPPED | OUTPATIENT
Start: 2020-11-20 | End: 2020-12-07

## 2020-11-20 RX ORDER — IBUPROFEN 200 MG
600 TABLET ORAL EVERY 6 HOURS PRN
Qty: 90 TABLET | Refills: 3 | Status: SHIPPED | OUTPATIENT
Start: 2020-11-20 | End: 2021-02-05

## 2020-11-20 NOTE — LETTER
"    11/20/2020         RE: Jennifer Cervantes  [address]  [facility] [address]        Dear Colleague,    Thank you for referring your patient, Jennifer Cervantes, to the [facility]. Please see a copy of my visit note below.    Sickle Cell Outpatient Follow Up Visit      Date of encounter: Nov 20, 2020    Jennifer Cervantes is a 20 year old female who is being evaluated via a billable video visit.      The patient has been notified of following:     \"This video visit will be conducted via a call between you and your physician/provider. We have found that certain health care needs can be provided without the need for an in-person physical exam.  This service lets us provide the care you need with a video conversation.  If a prescription is necessary we can send it directly to your pharmacy.  If lab work is needed we can place an order for that and you can then stop by our lab to have the test done at a later time.    Video visits are billed at different rates depending on your insurance coverage.  Please reach out to your insurance provider with any questions.    If during the course of the call the physician/provider feels a video visit is not appropriate, you will not be charged for this service.\"    Patient has given verbal consent for Video visit? Yes  How would you like to obtain your AVS? MyChart  If you are dropped from the video visit, the video invite should be resent to: Text to cell phone: [phone]  Will anyone else be joining your video visit? No     Shavonne MEADOWS    Video-Visit Details    Type of service:  Video Visit    Video Start Time:4:01 PM  Video End Time: 4:27 PM    Originating Location (pt. Location): Home    Distant Location (provider location):  Provider's home     Platform used for Video Visit: Clarisa Duncan MD     ------------------------    Interval History: Jennifer has not been admitted since our last visit, though she has needed to come in for " infusions on several occasions. She feels like her home management strategy that we have in place for home is working. Most weeks, she rarely takes more oxycodone than Rx unless told to do so. She did need to use more in the last week or so and got frustrated earlier this week as she felt like refills were being withheld due to her race. She [facility] who was able to calm her down.     Today, she has some back pain but it is reasonable. No fevers, no cough. She has not had any sick contacts. Her main concern on the medication front is the frustration she gets from [facility]. She said she often has to play phone tag and can only get her monthly Jadenu 15 days at a time so she goes large stretches without being able to take it.    History of Present Illness:  (Initial visit remarks from Dr. 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 She also has significant anxiety and depression with a strong anxiety about transferring to the adult clinic. She usually has pain crises secondary to the anxiety.      --------------------------------  Plan last reviewed with patient: 11/6/2020    Patient background: 21yo F, enjoys movies and kids though there are times where she does not really want to talk to people. Does not have a lot of social support at home.     Sickle Cell Disease History  Primary Hematologist: Perla  PCP: Raul  Genotype: SS  Acute Pain Crisis Treatment:    ER/Acute Care/Infusion Clinic:   o Morphine 2 mg IVP/SC Q1H X 3 doses  o Toradol x1  o Maintenance IV fluids with LR  o Other: Benadryl PO and Zofran 8 mg IV PRN itching or nausea    Inpatient:  o Opioid: Morphine 2 mg IV Q1H PRN until  PCA starts  o PCA plan:   - PCA button dose: Morphine 1 mg  - Lockout: 20 minutes  - Continuous Infusion: consider 1 mg/hr  - One hr max: 4 mg  o Other Medications: Benadryl, Zofran  o If PCA not working, she Has had success with ketamine starting at 4mg/h and advancing only to 6mg/h, as 8mg/h made her feel quite poorly.  o Continue ASA (ok to give celebrex)--discuss Toradol with hematology first  o Supportive Care: Docusate, Senna  Chronic Pain Medications:    Home regimen  OxyCONTIN 10 mg po q 12 hrs and oxycodone 10 mg p.o. q.6 hours p.r.n. breakthrough pain.  She also continues on Celebrex, and Zoloft among other medications.    -Also benefits from mental health visits, acupuncture  Baseline Hemoglobin: 9.5 g/dL (on chronic transfusions), 7-8 without  Hydroxyurea use: Yes  H/O blood transfusions: Yes, several (iron overload) Most recent 5/22/20    H/O Transfusion Reactions: no    Antibodies:none  H/O Acute Chest Syndrome: Yes    Last episode: 10/2019     ICU/intubation: unsure  H/O Stroke: Yes (managed with chronic transfusions in the past)  H/O VTE: no  H/O Cholecystectomy or Splenectomy: no  H/O Asthma, Pulm HTN, AVN, Leg Ulcers, Nephropathy, Retinopathy, etc: Iron overload, asthma, chronic lung disease, developmental delay from early stroke    -------------------------------------------    Sickle Cell Disease Comprehensive Checklist    Bone Health/Avascular Necrosis Screening/Symptoms (each visit): no new concerns today    Leg Ulcer evaluation (every visit): none    Hypertension (every visit): mildly hypertensive today, unclear whether it is anxiety about being in clinic or more than that    Last ophthalmologic exam: 7/29/20    Last urinalysis for microalbuminuria/CKD (annually): within last year    Last pulmonary evaluation (asthma, AMAN, pulm HTN): within last year    Stroke/silent cerebral infarct Hx (Y/N): Yes TIA ~2014, first event ~age 1 with full stroke and R sided weakness    Last PCP Visit:  8/2020    Vaccines:  o PCV13: 5/13/19  o Pneumovax (PPSV23): 3/04, 10/09, 7/12/19 (next due 7/2024)  o Menactra: 4/2010, 9/2015 (MCV due Sept 2020)  o Influenza: got 19-20 vaccine    Audiology (chelation): done 6/2020, normal    Past Medical History:  Past Medical History:   Diagnosis Date     Anemia      Anxiety      Bleeding disorder (H)      Blood clotting disorder (H)      Cerebral infarction (H) 2015     Cognitive developmental delay     low IQ. 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 (H)     right hand contractures     Iron overload due to repeated red blood cell transfusions      Migraines      Oppositional defiant behavior      Uncomplicated asthma        Past Surgical History:  Past Surgical History:   Procedure Laterality Date     AS INSERT TUNNELED CV 2 CATH W/O PORT/PUMP       C BREAST REDUCTION (INCLUDES LIPO) TIER 3 Bilateral 04/23/2019     IR CVC NON TUNNEL PLACEMENT  4/7/2020     REPAIR TENDON ELBOW Right 10/2/2019    Procedure: Right Elbow Flexor Lengthening, Flexor Pronator Slide Of Wrist and Finger, Thumb Adductor Lengthening;  Surgeon: Anai Franco MD;  Location: [facility] OR     TONSILLECTOMY Bilateral 10/2/2019    Procedure: Bilateral Tonsillectomy;  Surgeon: Farhana Guy MD;  Location: [facility] OR       Family History:   Family History   Problem Relation Age of Onset     Sickle Cell Trait Mother      Sickle Cell Trait Father        Social History:  Social History     Socioeconomic History     Marital status: Single     Spouse name: Not on file     Number of children: Not on file     Years of education: Not on file     Highest education level: Not on file   Occupational History     Not on file   Social Needs     Financial resource strain: Not on file     Food insecurity     Worry: Not on file     Inability: Not on file     Transportation needs      "Medical: Not on file     Non-medical: Not on file   Tobacco Use     Smoking status: Never Smoker     Smokeless tobacco: Never Used   Substance and Sexual Activity     Alcohol use: Not Currently     Alcohol/week: 0.0 standard drinks     Drug use: Never     Sexual activity: Not Currently     Partners: Male     Birth control/protection: Other   Lifestyle     Physical activity     Days per week: Not on file     Minutes per session: Not on file     Stress: Not on file   Relationships     Social connections     Talks on phone: Not on file     Gets together: Not on file     Attends Oriental orthodox service: Not on file     Active member of club or organization: Not on file     Attends meetings of clubs or organizations: Not on file     Relationship status: Not on file     Intimate partner violence     Fear of current or ex partner: Not on file     Emotionally abused: Not on file     Physically abused: Not on file     Forced sexual activity: Not on file   Other Topics Concern     Parent/sibling w/ CABG, MI or angioplasty before 65F 55M? Not Asked   Social History Narrative    Lives with mom and extended family but \"toxic environment\" per her report. She would like to move out but cannot financially do so. She has minimal support at home despite her significant SCD comorbidities and cognitive delay from stroke.       Medications:  Current Outpatient Medications   Medication     acetaminophen (TYLENOL) 325 MG tablet     albuterol (PROVENTIL) (2.5 MG/3ML) 0.083% neb solution     aspirin (ASPIRIN) 81 MG EC tablet     Budesonide-Formoterol Fumarate (SYMBICORT IN)     celecoxib (CELEBREX) 100 MG capsule     diphenhydrAMINE (BENADRYL) 25 MG capsule     GNP SENNA-LAX 8.6 MG tablet     Hydroxyurea 1000 MG TABS     ibuprofen (ADVIL/MOTRIN) 200 MG tablet     JADENU 360 MG tablet     medroxyPROGESTERone (DEPO-PROVERA) 150 MG/ML IM injection     naloxone (NARCAN) 4 MG/0.1ML nasal spray     ondansetron (ZOFRAN) 8 MG tablet     oxyCODONE IR " (ROXICODONE) 10 MG tablet     OXYCONTIN 10 MG 12 hr tablet     sertraline (ZOLOFT) 100 MG tablet     No current facility-administered medications for this visit.          Physical Exam:   There were no vitals taken for this visit. (virtual video)    GEN: young  female, energetic and a bit animated but not overly worked up  HEENT: full head of hair, no icterus, MMM  NECK: no visible asymmetry  RESP: speaking in full sentences, no increased work of breathing  MSK: brace on right wrist and right ankle (AFO)  NEURO: alert and oriented, stable contracture of right wrist.   SKIN: port site c/d/i (visible on video).     Labs:   Results for HIMANSHU AL (MRN [id number]) as of 11/22/2020 21:56   Ref. Range 11/17/2020 09:04   Sodium Latest Ref Range: 133 - 144 mmol/L 138   Potassium Latest Ref Range: 3.4 - 5.3 mmol/L 3.6   Chloride Latest Ref Range: 94 - 109 mmol/L 109   Carbon Dioxide Latest Ref Range: 20 - 32 mmol/L 21   Urea Nitrogen Latest Ref Range: 7 - 30 mg/dL 9   Creatinine Latest Ref Range: 0.52 - 1.04 mg/dL 0.58   GFR Estimate Latest Ref Range: >60 mL/min/1.73_m2 >90   GFR Estimate If Black Latest Ref Range: >60 mL/min/1.73_m2 >90   Calcium Latest Ref Range: 8.5 - 10.1 mg/dL 8.8   Anion Gap Latest Ref Range: 3 - 14 mmol/L 8   Albumin Latest Ref Range: 3.4 - 5.0 g/dL 4.0   Protein Total Latest Ref Range: 6.8 - 8.8 g/dL 7.8   Bilirubin Total Latest Ref Range: 0.2 - 1.3 mg/dL 4.0 (H)   Alkaline Phosphatase Latest Ref Range: 40 - 150 U/L 71   ALT Latest Ref Range: 0 - 50 U/L 88 (H)   AST Latest Ref Range: 0 - 45 U/L 106 (H)   Ferritin Latest Ref Range: 12 - 150 ng/mL 10,810 (H)   Glucose Latest Ref Range: 70 - 99 mg/dL 103 (H)   WBC Latest Ref Range: 4.0 - 11.0 10e9/L 11.3 (H)   Hemoglobin Latest Ref Range: 11.7 - 15.7 g/dL 7.6 (L)   Hematocrit Latest Ref Range: 35.0 - 47.0 % 21.7 (L)   Platelet Count Latest Ref Range: 150 - 450 10e9/L 279   RBC Count Latest Ref Range: 3.8 - 5.2 10e12/L 2.34 (L)   MCV  Latest Ref Range: 78 - 100 fl 93   MCH Latest Ref Range: 26.5 - 33.0 pg 32.5   MCHC Latest Ref Range: 31.5 - 36.5 g/dL 35.0   RDW Latest Ref Range: 10.0 - 15.0 % 24.1 (H)   Diff Method Unknown Manual Differential   % Neutrophils Latest Units: % 67.9   % Lymphocytes Latest Units: % 19.1   % Monocytes Latest Units: % 5.2   % Eosinophils Latest Units: % 4.3   % Basophils Latest Units: % 3.5   Nucleated RBCs Latest Ref Range: 0 /100 17 (H)   Absolute Neutrophil Latest Ref Range: 1.6 - 8.3 10e9/L 7.7   Absolute Lymphocytes Latest Ref Range: 0.8 - 5.3 10e9/L 2.2   Absolute Monocytes Latest Ref Range: 0.0 - 1.3 10e9/L 0.6   Absolute Eosinophils Latest Ref Range: 0.0 - 0.7 10e9/L 0.5   Absolute Basophils Latest Ref Range: 0.0 - 0.2 10e9/L 0.4 (H)   Absolute Nucleated RBC Unknown 2.0   Anisocytosis Unknown Marked   Poikilocytosis Unknown Marked   Polychromasia Unknown Marked   Sickle Cells Unknown Marked   Target Cells Unknown Marked   Platelet Estimate Unknown Confirming automated cell count   % Retic Latest Ref Range: 0.5 - 2.0 % Interfering subst...   Absolute Retic Latest Ref Range: 25 - 95 10e9/L 554.8 (H)       Imaging: none today    Assessment:  Jennifer Cervantes is a 20 year old female with HbSS. Her disease has been complicated by stroke leading to significant cognitive delay and right sided hemiparesis, high anxiety leading to frequent pain crises on top of chronic pain syndrome, poor social structure at home with no real support, and iron overload secondary to repeated transfusions.     Major Topics of the Visit:  1. Pain Management: I spent a long time discussing her current pain medication regimen and what works for her and what doesn't. I discussed that her home oxycodone/Oxycontin regimen is quite high, and given her comorbidities and amount of medications, I don't want to automatically refill opioids without having a discussion and seeing whether we can fix any issues that might be exacerbating her crises. I  discussed how I never refill opioids in fewer than 2 weeks and prefer 3-4 weeks. If her pain needs are starting to escalate, we will need to discuss alternatives such as a referral to a pain specialist.  2. Iron Overload/Pharmacy Issues: It sounds like a combination of insurance restrictions and specialty pharmacy protocols (ie. Confirming a phone call before sending Rx) are combining to cause real problems for Jennifer. Specifically, it is obvious by the abrupt rise in her ferritin over the last few months that she is not getting the Jadenu (I trust she is taking it when she has it and have seen ferritin trends to support it). She has not even received a transfusion during this time. Our care coordinator Julieta and I are reaching out to pharmacy to see if we can improve this situation. In the current state, this is actually really increasing her morbidity and even mortality risks, and I do not think we should be in this situation for iatrogenic/system reasons. If we cannot get this squared away ASAP, I will consider doing in-clinic IV chelation, though that is less-than-ideal during the pandemic.  3. High RBC turnover: Jennifer really has a high degree of RBC turnover, more than most patients I have seen. I am going to see whether Oxbryta can be started to see if we can improve this--it could reduce the degree of iron overload as well.  4. Refills: Tylenol, ibuprofen, and oxycodone. Oxybryta Rx sent  5. Follow up: 2 weeks with ANDREAS. Will need to determine by December whether we need to do IV chelation and whether Oxbryta will be covered.     I spent a total of 26 minutes face-to-face with Jennifer Cervantes during today's office visit. Over 50% of the time was spent discussing how we may work to improve the pharmacy issues and counseling on the pros and cons to our current pain management strategies so she could understand. See note for details.    Eric Duncan MD  Hematologist  Division of Hematology, Oncology, and  Transplantation  [facility]  [facility]  Pager: [phone]

## 2020-11-20 NOTE — PROGRESS NOTES
"Sickle Cell Outpatient Follow Up Visit      Date of encounter: Nov 20, 2020    Jennifer Cervantes is a 20 year old female who is being evaluated via a billable video visit.      The patient has been notified of following:     \"This video visit will be conducted via a call between you and your physician/provider. We have found that certain health care needs can be provided without the need for an in-person physical exam.  This service lets us provide the care you need with a video conversation.  If a prescription is necessary we can send it directly to your pharmacy.  If lab work is needed we can place an order for that and you can then stop by our lab to have the test done at a later time.    Video visits are billed at different rates depending on your insurance coverage.  Please reach out to your insurance provider with any questions.    If during the course of the call the physician/provider feels a video visit is not appropriate, you will not be charged for this service.\"    Patient has given verbal consent for Video visit? Yes  How would you like to obtain your AVS? MyChart  If you are dropped from the video visit, the video invite should be resent to: Text to cell phone: [phone]  Will anyone else be joining your video visit? No     Shavonne Guerrero JAS    Video-Visit Details    Type of service:  Video Visit    Video Start Time:4:01 PM  Video End Time: 4:27 PM    Originating Location (pt. Location): Home    Distant Location (provider location):  Provider's home     Platform used for Video Visit: Clarisa Duncan MD     ------------------------    Interval History: Jennifer has not been admitted since our last visit, though she has needed to come in for infusions on several occasions. She feels like her home management strategy that we have in place for home is working. Most weeks, she rarely takes more oxycodone than Rx unless told to do so. She did need to use more in the last week or so and got " frustrated earlier this week as she felt like refills were being withheld due to her race. [facility] who was able to calm her down.     Today, she has some back pain but it is reasonable. No fevers, no cough. She has not had any sick contacts. Her main concern on the medication front is the frustration she gets from [facility]. She said she often has to play phone tag and can only get her monthly Jadenu 15 days at a time so she goes large stretches without being able to take it.    History of Present Illness:  (Initial visit remarks from Dr. 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 She also has significant anxiety and depression with a strong anxiety about transferring to the adult clinic. She usually has pain crises secondary to the anxiety.      --------------------------------  Plan last reviewed with patient: 11/6/2020    Patient background: 19yo F, enjoys movies and kids though there are times where she does not really want to talk to people. Does not have a lot of social support at home.     Sickle Cell Disease History  Primary Hematologist: Perla  PCP: Raul  Genotype: SS  Acute Pain Crisis Treatment:    ER/Acute Care/Infusion Clinic:   o Morphine 2 mg IVP/SC Q1H X 3 doses  o Toradol x1  o Maintenance IV fluids with LR  o Other: Benadryl PO and Zofran 8 mg IV PRN itching or nausea    Inpatient:  o Opioid: Morphine 2 mg IV Q1H PRN until PCA starts  o PCA plan:   - PCA button dose: Morphine 1 mg  - Lockout: 20 minutes  - Continuous Infusion: consider 1 mg/hr  - One hr max: 4 mg  o Other Medications: Benadryl, Zofran  o If PCA not working, she Has had success with ketamine starting at  4mg/h and advancing only to 6mg/h, as 8mg/h made her feel quite poorly.  o Continue ASA (ok to give celebrex)--discuss Toradol with hematology first  o Supportive Care: Docusate Senna  Chronic Pain Medications:    Home regimen  OxyCONTIN 10 mg po q 12 hrs and oxycodone 10 mg p.o. q.6 hours p.r.n. breakthrough pain.  She also continues on Celebrex, and Zoloft among other medications.    -Also benefits from mental health visits, acupuncture  Baseline Hemoglobin: 9.5 g/dL (on chronic transfusions), 7-8 without  Hydroxyurea use: Yes  H/O blood transfusions: Yes, several (iron overload) Most recent 5/22/20    H/O Transfusion Reactions: no    Antibodies:none  H/O Acute Chest Syndrome: Yes    Last episode: 10/2019     ICU/intubation: unsure  H/O Stroke: Yes (managed with chronic transfusions in the past)  H/O VTE: no  H/O Cholecystectomy or Splenectomy: no  H/O Asthma, Pulm HTN, AVN, Leg Ulcers, Nephropathy, Retinopathy, etc: Iron overload, asthma, chronic lung disease, developmental delay from early stroke    -------------------------------------------    Sickle Cell Disease Comprehensive Checklist    Bone Health/Avascular Necrosis Screening/Symptoms (each visit): no new concerns today    Leg Ulcer evaluation (every visit): none    Hypertension (every visit): mildly hypertensive today, unclear whether it is anxiety about being in clinic or more than that    Last ophthalmologic exam: 7/29/20    Last urinalysis for microalbuminuria/CKD (annually): within last year    Last pulmonary evaluation (asthma, AMAN, pulm HTN): within last year    Stroke/silent cerebral infarct Hx (Y/N): Yes TIA ~2014, first event ~age 1 with full stroke and R sided weakness    Last PCP Visit: 8/2020    Vaccines:  o PCV13: 5/13/19  o Pneumovax (PPSV23): 3/04, 10/09, 7/12/19 (next due 7/2024)  o Menactra: 4/2010, 9/2015 (MCV due Sept 2020)  o Influenza: got 19-20 vaccine    Audiology (chelation): done 6/2020, normal    Past Medical History:  Past  Medical History:   Diagnosis Date     Anemia      Anxiety      Bleeding disorder (H)      Blood clotting disorder (H)      Cerebral infarction (H) 2015     Cognitive developmental delay     low IQ. 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 (H)     right hand contractures     Iron overload due to repeated red blood cell transfusions      Migraines      Oppositional defiant behavior      Uncomplicated asthma        Past Surgical History:  Past Surgical History:   Procedure Laterality Date     AS INSERT TUNNELED CV 2 CATH W/O PORT/PUMP       C BREAST REDUCTION (INCLUDES LIPO) TIER 3 Bilateral 04/23/2019     IR CVC NON TUNNEL PLACEMENT  4/7/2020     REPAIR TENDON ELBOW Right 10/2/2019    Procedure: Right Elbow Flexor Lengthening, Flexor Pronator Slide Of Wrist and Finger, Thumb Adductor Lengthening;  Surgeon: Anai Franco MD;  Location: [facility] OR     TONSILLECTOMY Bilateral 10/2/2019    Procedure: Bilateral Tonsillectomy;  Surgeon: Farhana Guy MD;  Location: [facility] OR       Family History:   Family History   Problem Relation Age of Onset     Sickle Cell Trait Mother      Sickle Cell Trait Father        Social History:  Social History     Socioeconomic History     Marital status: Single     Spouse name: Not on file     Number of children: Not on file     Years of education: Not on file     Highest education level: Not on file   Occupational History     Not on file   Social Needs     Financial resource strain: Not on file     Food insecurity     Worry: Not on file     Inability: Not on file     Transportation needs     Medical: Not on file     Non-medical: Not on file   Tobacco Use     Smoking status: Never Smoker     Smokeless tobacco: Never Used   Substance and Sexual Activity     Alcohol use: Not Currently     Alcohol/week: 0.0 standard drinks     Drug use: Never      "Sexual activity: Not Currently     Partners: Male     Birth control/protection: Other   Lifestyle     Physical activity     Days per week: Not on file     Minutes per session: Not on file     Stress: Not on file   Relationships     Social connections     Talks on phone: Not on file     Gets together: Not on file     Attends Alevism service: Not on file     Active member of club or organization: Not on file     Attends meetings of clubs or organizations: Not on file     Relationship status: Not on file     Intimate partner violence     Fear of current or ex partner: Not on file     Emotionally abused: Not on file     Physically abused: Not on file     Forced sexual activity: Not on file   Other Topics Concern     Parent/sibling w/ CABG, MI or angioplasty before 65F 55M? Not Asked   Social History Narrative    Lives with mom and extended family but \"toxic environment\" per her report. She would like to move out but cannot financially do so. She has minimal support at home despite her significant SCD comorbidities and cognitive delay from stroke.       Medications:  Current Outpatient Medications   Medication     acetaminophen (TYLENOL) 325 MG tablet     albuterol (PROVENTIL) (2.5 MG/3ML) 0.083% neb solution     aspirin (ASPIRIN) 81 MG EC tablet     Budesonide-Formoterol Fumarate (SYMBICORT IN)     celecoxib (CELEBREX) 100 MG capsule     diphenhydrAMINE (BENADRYL) 25 MG capsule     GNP SENNA-LAX 8.6 MG tablet     Hydroxyurea 1000 MG TABS     ibuprofen (ADVIL/MOTRIN) 200 MG tablet     JADENU 360 MG tablet     medroxyPROGESTERone (DEPO-PROVERA) 150 MG/ML IM injection     naloxone (NARCAN) 4 MG/0.1ML nasal spray     ondansetron (ZOFRAN) 8 MG tablet     oxyCODONE IR (ROXICODONE) 10 MG tablet     OXYCONTIN 10 MG 12 hr tablet     sertraline (ZOLOFT) 100 MG tablet     No current facility-administered medications for this visit.          Physical Exam:   There were no vitals taken for this visit. (virtual video)    GEN: young "  female, energetic and a bit animated but not overly worked up  HEENT: full head of hair, no icterus, MMM  NECK: no visible asymmetry  RESP: speaking in full sentences, no increased work of breathing  MSK: brace on right wrist and right ankle (AFO)  NEURO: alert and oriented, stable contracture of right wrist.   SKIN: port site c/d/i (visible on video).     Labs:   Results for HIMANSHU AL (MRN [id number]) as of 11/22/2020 21:56   Ref. Range 11/17/2020 09:04   Sodium Latest Ref Range: 133 - 144 mmol/L 138   Potassium Latest Ref Range: 3.4 - 5.3 mmol/L 3.6   Chloride Latest Ref Range: 94 - 109 mmol/L 109   Carbon Dioxide Latest Ref Range: 20 - 32 mmol/L 21   Urea Nitrogen Latest Ref Range: 7 - 30 mg/dL 9   Creatinine Latest Ref Range: 0.52 - 1.04 mg/dL 0.58   GFR Estimate Latest Ref Range: >60 mL/min/1.73_m2 >90   GFR Estimate If Black Latest Ref Range: >60 mL/min/1.73_m2 >90   Calcium Latest Ref Range: 8.5 - 10.1 mg/dL 8.8   Anion Gap Latest Ref Range: 3 - 14 mmol/L 8   Albumin Latest Ref Range: 3.4 - 5.0 g/dL 4.0   Protein Total Latest Ref Range: 6.8 - 8.8 g/dL 7.8   Bilirubin Total Latest Ref Range: 0.2 - 1.3 mg/dL 4.0 (H)   Alkaline Phosphatase Latest Ref Range: 40 - 150 U/L 71   ALT Latest Ref Range: 0 - 50 U/L 88 (H)   AST Latest Ref Range: 0 - 45 U/L 106 (H)   Ferritin Latest Ref Range: 12 - 150 ng/mL 10,810 (H)   Glucose Latest Ref Range: 70 - 99 mg/dL 103 (H)   WBC Latest Ref Range: 4.0 - 11.0 10e9/L 11.3 (H)   Hemoglobin Latest Ref Range: 11.7 - 15.7 g/dL 7.6 (L)   Hematocrit Latest Ref Range: 35.0 - 47.0 % 21.7 (L)   Platelet Count Latest Ref Range: 150 - 450 10e9/L 279   RBC Count Latest Ref Range: 3.8 - 5.2 10e12/L 2.34 (L)   MCV Latest Ref Range: 78 - 100 fl 93   MCH Latest Ref Range: 26.5 - 33.0 pg 32.5   MCHC Latest Ref Range: 31.5 - 36.5 g/dL 35.0   RDW Latest Ref Range: 10.0 - 15.0 % 24.1 (H)   Diff Method Unknown Manual Differential   % Neutrophils Latest Units: % 67.9   %  Lymphocytes Latest Units: % 19.1   % Monocytes Latest Units: % 5.2   % Eosinophils Latest Units: % 4.3   % Basophils Latest Units: % 3.5   Nucleated RBCs Latest Ref Range: 0 /100 17 (H)   Absolute Neutrophil Latest Ref Range: 1.6 - 8.3 10e9/L 7.7   Absolute Lymphocytes Latest Ref Range: 0.8 - 5.3 10e9/L 2.2   Absolute Monocytes Latest Ref Range: 0.0 - 1.3 10e9/L 0.6   Absolute Eosinophils Latest Ref Range: 0.0 - 0.7 10e9/L 0.5   Absolute Basophils Latest Ref Range: 0.0 - 0.2 10e9/L 0.4 (H)   Absolute Nucleated RBC Unknown 2.0   Anisocytosis Unknown Marked   Poikilocytosis Unknown Marked   Polychromasia Unknown Marked   Sickle Cells Unknown Marked   Target Cells Unknown Marked   Platelet Estimate Unknown Confirming automated cell count   % Retic Latest Ref Range: 0.5 - 2.0 % Interfering subst...   Absolute Retic Latest Ref Range: 25 - 95 10e9/L 554.8 (H)       Imaging: none today    Assessment:  Jennifer Cervantes is a 20 year old female with HbSS. Her disease has been complicated by stroke leading to significant cognitive delay and right sided hemiparesis, high anxiety leading to frequent pain crises on top of chronic pain syndrome, poor social structure at home with no real support, and iron overload secondary to repeated transfusions.     Major Topics of the Visit:  1. Pain Management: I spent a long time discussing her current pain medication regimen and what works for her and what doesn't. I discussed that her home oxycodone/Oxycontin regimen is quite high, and given her comorbidities and amount of medications, I don't want to automatically refill opioids without having a discussion and seeing whether we can fix any issues that might be exacerbating her crises. I discussed how I never refill opioids in fewer than 2 weeks and prefer 3-4 weeks. If her pain needs are starting to escalate, we will need to discuss alternatives such as a referral to a pain specialist.  2. Iron Overload/Pharmacy Issues: It sounds like a  combination of insurance restrictions and specialty pharmacy protocols (ie. Confirming a phone call before sending Rx) are combining to cause real problems for Jennifer. Specifically, it is obvious by the abrupt rise in her ferritin over the last few months that she is not getting the Jadenu (I trust she is taking it when she has it and have seen ferritin trends to support it). She has not even received a transfusion during this time. Our care coordinator Julieta and I are reaching out to pharmacy to see if we can improve this situation. In the current state, this is actually really increasing her morbidity and even mortality risks, and I do not think we should be in this situation for iatrogenic/system reasons. If we cannot get this squared away ASAP, I will consider doing in-clinic IV chelation, though that is less-than-ideal during the pandemic.  3. High RBC turnover: Jennifer really has a high degree of RBC turnover, more than most patients I have seen. I am going to see whether Oxbryta can be started to see if we can improve this--it could reduce the degree of iron overload as well.  4. Refills: Tylenol, ibuprofen, and oxycodone. Oxybryta Rx sent  5. Follow up: 2 weeks with ANDREAS. Will need to determine by December whether we need to do IV chelation and whether Oxbryta will be covered.     I spent a total of 26 minutes face-to-face with Jennifer Cervantes during today's office visit. Over 50% of the time was spent discussing how we may work to improve the pharmacy issues and counseling on the pros and cons to our current pain management strategies so she could understand. See note for details.    Eric Duncan MD  Hematologist  Division of Hematology, Oncology, and Transplantation  [facility]  [facility]  Pager: [phone]

## 2020-11-20 NOTE — LETTER
"    11/20/2020         RE: Jennifer Cervantes  [address]  [facility] [address]      Sickle Cell Outpatient Follow Up Visit      Date of encounter: Nov 20, 2020    Jennifer Cervantes is a 20 year old female who is being evaluated via a billable video visit.      The patient has been notified of following:     \"This video visit will be conducted via a call between you and your physician/provider. We have found that certain health care needs can be provided without the need for an in-person physical exam.  This service lets us provide the care you need with a video conversation.  If a prescription is necessary we can send it directly to your pharmacy.  If lab work is needed we can place an order for that and you can then stop by our lab to have the test done at a later time.    Video visits are billed at different rates depending on your insurance coverage.  Please reach out to your insurance provider with any questions.    If during the course of the call the physician/provider feels a video visit is not appropriate, you will not be charged for this service.\"    Patient has given verbal consent for Video visit? Yes  How would you like to obtain your AVS? MyChart  If you are dropped from the video visit, the video invite should be resent to: Text to cell phone: [phone]  Will anyone else be joining your video visit? No     Shavonne MEADOWS    Video-Visit Details    Type of service:  Video Visit    Video Start Time:4:01 PM  Video End Time: 4:27 PM    Originating Location (pt. Location): Home    Distant Location (provider location):  Provider's home     Platform used for Video Visit: Clarisa Duncan MD     ------------------------    Interval History: Jennifer has not been admitted since our last visit, though she has needed to come in for infusions on several occasions. She feels like her home management strategy that we have in place for home is working. Most weeks, she rarely takes more oxycodone " than Rx unless told to do so. She did need to use more in the last week or so and got frustrated earlier this week as she felt like refills were being withheld due to her race. [facility] who was able to calm her down.     Today, she has some back pain but it is reasonable. No fevers, no cough. She has not had any sick contacts. Her main concern on the medication front is the frustration she gets from [facility]. She said she often has to play phone tag and can only get her monthly Jadenu 15 days at a time so she goes large stretches without being able to take it.    History of Present Illness:  (Initial visit remarks from Dr. 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 She also has significant anxiety and depression with a strong anxiety about transferring to the adult clinic. She usually has pain crises secondary to the anxiety.      --------------------------------  Plan last reviewed with patient: 11/6/2020    Patient background: 21yo F, enjoys movies and kids though there are times where she does not really want to talk to people. Does not have a lot of social support at home.     Sickle Cell Disease History  Primary Hematologist: Perla  PCP: Raul  Genotype: SS  Acute Pain Crisis Treatment:    ER/Acute Care/Infusion Clinic:   o Morphine 2 mg IVP/SC Q1H X 3 doses  o Toradol x1  o Maintenance IV fluids with LR  o Other: Benadryl PO and Zofran 8 mg IV PRN itching or nausea    Inpatient:  o Opioid: Morphine 2 mg IV Q1H PRN until PCA starts  o PCA plan:   - PCA button dose: Morphine 1 mg  - Lockout: 20 minutes  - Continuous Infusion: consider 1 mg/hr  - One hr max: 4 mg  o Other Medications:  Benadryl, Zofran  o If PCA not working, she Has had success with ketamine starting at 4mg/h and advancing only to 6mg/h, as 8mg/h made her feel quite poorly.  o Continue ASA (ok to give celebrex)--discuss Toradol with hematology first  o Supportive Care: Docusate, Senna  Chronic Pain Medications:    Home regimen  OxyCONTIN 10 mg po q 12 hrs and oxycodone 10 mg p.o. q.6 hours p.r.n. breakthrough pain.  She also continues on Celebrex, and Zoloft among other medications.    -Also benefits from mental health visits, acupuncture  Baseline Hemoglobin: 9.5 g/dL (on chronic transfusions), 7-8 without  Hydroxyurea use: Yes  H/O blood transfusions: Yes, several (iron overload) Most recent 5/22/20    H/O Transfusion Reactions: no    Antibodies:none  H/O Acute Chest Syndrome: Yes    Last episode: 10/2019     ICU/intubation: unsure  H/O Stroke: Yes (managed with chronic transfusions in the past)  H/O VTE: no  H/O Cholecystectomy or Splenectomy: no  H/O Asthma, Pulm HTN, AVN, Leg Ulcers, Nephropathy, Retinopathy, etc: Iron overload, asthma, chronic lung disease, developmental delay from early stroke    -------------------------------------------    Sickle Cell Disease Comprehensive Checklist    Bone Health/Avascular Necrosis Screening/Symptoms (each visit): no new concerns today    Leg Ulcer evaluation (every visit): none    Hypertension (every visit): mildly hypertensive today, unclear whether it is anxiety about being in clinic or more than that    Last ophthalmologic exam: 7/29/20    Last urinalysis for microalbuminuria/CKD (annually): within last year    Last pulmonary evaluation (asthma, AMAN, pulm HTN): within last year    Stroke/silent cerebral infarct Hx (Y/N): Yes TIA ~2014, first event ~age 1 with full stroke and R sided weakness    Last PCP Visit: 8/2020    Vaccines:  o PCV13: 5/13/19  o Pneumovax (PPSV23): 3/04, 10/09, 7/12/19 (next due 7/2024)  o Menactra: 4/2010, 9/2015 (MCV due Sept 2020)  o Influenza: got 19-20  vaccine    Audiology (chelation): done 6/2020, normal    Past Medical History:  Past Medical History:   Diagnosis Date     Anemia      Anxiety      Bleeding disorder (H)      Blood clotting disorder (H)      Cerebral infarction (H) 2015     Cognitive developmental delay     low IQ. 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 (H)     right hand contractures     Iron overload due to repeated red blood cell transfusions      Migraines      Oppositional defiant behavior      Uncomplicated asthma        Past Surgical History:  Past Surgical History:   Procedure Laterality Date     AS INSERT TUNNELED CV 2 CATH W/O PORT/PUMP       C BREAST REDUCTION (INCLUDES LIPO) TIER 3 Bilateral 04/23/2019     IR CVC NON TUNNEL PLACEMENT  4/7/2020     REPAIR TENDON ELBOW Right 10/2/2019    Procedure: Right Elbow Flexor Lengthening, Flexor Pronator Slide Of Wrist and Finger, Thumb Adductor Lengthening;  Surgeon: Anai Franco MD;  Location: [facility] OR     TONSILLECTOMY Bilateral 10/2/2019    Procedure: Bilateral Tonsillectomy;  Surgeon: Farhana Guy MD;  Location: [facility] OR       Family History:   Family History   Problem Relation Age of Onset     Sickle Cell Trait Mother      Sickle Cell Trait Father        Social History:  Social History     Socioeconomic History     Marital status: Single     Spouse name: Not on file     Number of children: Not on file     Years of education: Not on file     Highest education level: Not on file   Occupational History     Not on file   Social Needs     Financial resource strain: Not on file     Food insecurity     Worry: Not on file     Inability: Not on file     Transportation needs     Medical: Not on file     Non-medical: Not on file   Tobacco Use     Smoking status: Never Smoker     Smokeless tobacco: Never Used   Substance and Sexual Activity     Alcohol  "use: Not Currently     Alcohol/week: 0.0 standard drinks     Drug use: Never     Sexual activity: Not Currently     Partners: Male     Birth control/protection: Other   Lifestyle     Physical activity     Days per week: Not on file     Minutes per session: Not on file     Stress: Not on file   Relationships     Social connections     Talks on phone: Not on file     Gets together: Not on file     Attends Christian service: Not on file     Active member of club or organization: Not on file     Attends meetings of clubs or organizations: Not on file     Relationship status: Not on file     Intimate partner violence     Fear of current or ex partner: Not on file     Emotionally abused: Not on file     Physically abused: Not on file     Forced sexual activity: Not on file   Other Topics Concern     Parent/sibling w/ CABG, MI or angioplasty before 65F 55M? Not Asked   Social History Narrative    Lives with mom and extended family but \"toxic environment\" per her report. She would like to move out but cannot financially do so. She has minimal support at home despite her significant SCD comorbidities and cognitive delay from stroke.       Medications:  Current Outpatient Medications   Medication     acetaminophen (TYLENOL) 325 MG tablet     albuterol (PROVENTIL) (2.5 MG/3ML) 0.083% neb solution     aspirin (ASPIRIN) 81 MG EC tablet     Budesonide-Formoterol Fumarate (SYMBICORT IN)     celecoxib (CELEBREX) 100 MG capsule     diphenhydrAMINE (BENADRYL) 25 MG capsule     GNP SENNA-LAX 8.6 MG tablet     Hydroxyurea 1000 MG TABS     ibuprofen (ADVIL/MOTRIN) 200 MG tablet     JADENU 360 MG tablet     medroxyPROGESTERone (DEPO-PROVERA) 150 MG/ML IM injection     naloxone (NARCAN) 4 MG/0.1ML nasal spray     ondansetron (ZOFRAN) 8 MG tablet     oxyCODONE IR (ROXICODONE) 10 MG tablet     OXYCONTIN 10 MG 12 hr tablet     sertraline (ZOLOFT) 100 MG tablet     No current facility-administered medications for this visit.          Physical " Exam:   There were no vitals taken for this visit. (virtual video)    GEN: young  female, energetic and a bit animated but not overly worked up  HEENT: full head of hair, no icterus, MMM  NECK: no visible asymmetry  RESP: speaking in full sentences, no increased work of breathing  MSK: brace on right wrist and right ankle (AFO)  NEURO: alert and oriented, stable contracture of right wrist.   SKIN: port site c/d/i (visible on video).     Labs:   Results for HIMANSHU AL (MRN [id number]) as of 11/22/2020 21:56   Ref. Range 11/17/2020 09:04   Sodium Latest Ref Range: 133 - 144 mmol/L 138   Potassium Latest Ref Range: 3.4 - 5.3 mmol/L 3.6   Chloride Latest Ref Range: 94 - 109 mmol/L 109   Carbon Dioxide Latest Ref Range: 20 - 32 mmol/L 21   Urea Nitrogen Latest Ref Range: 7 - 30 mg/dL 9   Creatinine Latest Ref Range: 0.52 - 1.04 mg/dL 0.58   GFR Estimate Latest Ref Range: >60 mL/min/1.73_m2 >90   GFR Estimate If Black Latest Ref Range: >60 mL/min/1.73_m2 >90   Calcium Latest Ref Range: 8.5 - 10.1 mg/dL 8.8   Anion Gap Latest Ref Range: 3 - 14 mmol/L 8   Albumin Latest Ref Range: 3.4 - 5.0 g/dL 4.0   Protein Total Latest Ref Range: 6.8 - 8.8 g/dL 7.8   Bilirubin Total Latest Ref Range: 0.2 - 1.3 mg/dL 4.0 (H)   Alkaline Phosphatase Latest Ref Range: 40 - 150 U/L 71   ALT Latest Ref Range: 0 - 50 U/L 88 (H)   AST Latest Ref Range: 0 - 45 U/L 106 (H)   Ferritin Latest Ref Range: 12 - 150 ng/mL 10,810 (H)   Glucose Latest Ref Range: 70 - 99 mg/dL 103 (H)   WBC Latest Ref Range: 4.0 - 11.0 10e9/L 11.3 (H)   Hemoglobin Latest Ref Range: 11.7 - 15.7 g/dL 7.6 (L)   Hematocrit Latest Ref Range: 35.0 - 47.0 % 21.7 (L)   Platelet Count Latest Ref Range: 150 - 450 10e9/L 279   RBC Count Latest Ref Range: 3.8 - 5.2 10e12/L 2.34 (L)   MCV Latest Ref Range: 78 - 100 fl 93   MCH Latest Ref Range: 26.5 - 33.0 pg 32.5   MCHC Latest Ref Range: 31.5 - 36.5 g/dL 35.0   RDW Latest Ref Range: 10.0 - 15.0 % 24.1 (H)   Diff  Method Unknown Manual Differential   % Neutrophils Latest Units: % 67.9   % Lymphocytes Latest Units: % 19.1   % Monocytes Latest Units: % 5.2   % Eosinophils Latest Units: % 4.3   % Basophils Latest Units: % 3.5   Nucleated RBCs Latest Ref Range: 0 /100 17 (H)   Absolute Neutrophil Latest Ref Range: 1.6 - 8.3 10e9/L 7.7   Absolute Lymphocytes Latest Ref Range: 0.8 - 5.3 10e9/L 2.2   Absolute Monocytes Latest Ref Range: 0.0 - 1.3 10e9/L 0.6   Absolute Eosinophils Latest Ref Range: 0.0 - 0.7 10e9/L 0.5   Absolute Basophils Latest Ref Range: 0.0 - 0.2 10e9/L 0.4 (H)   Absolute Nucleated RBC Unknown 2.0   Anisocytosis Unknown Marked   Poikilocytosis Unknown Marked   Polychromasia Unknown Marked   Sickle Cells Unknown Marked   Target Cells Unknown Marked   Platelet Estimate Unknown Confirming automated cell count   % Retic Latest Ref Range: 0.5 - 2.0 % Interfering subst...   Absolute Retic Latest Ref Range: 25 - 95 10e9/L 554.8 (H)       Imaging: none today    Assessment:  Jennifer Cervantes is a 20 year old female with HbSS. Her disease has been complicated by stroke leading to significant cognitive delay and right sided hemiparesis, high anxiety leading to frequent pain crises on top of chronic pain syndrome, poor social structure at home with no real support, and iron overload secondary to repeated transfusions.     Major Topics of the Visit:  1. Pain Management: I spent a long time discussing her current pain medication regimen and what works for her and what doesn't. I discussed that her home oxycodone/Oxycontin regimen is quite high, and given her comorbidities and amount of medications, I don't want to automatically refill opioids without having a discussion and seeing whether we can fix any issues that might be exacerbating her crises. I discussed how I never refill opioids in fewer than 2 weeks and prefer 3-4 weeks. If her pain needs are starting to escalate, we will need to discuss alternatives such as a referral  to a pain specialist.  2. Iron Overload/Pharmacy Issues: It sounds like a combination of insurance restrictions and specialty pharmacy protocols (ie. Confirming a phone call before sending Rx) are combining to cause real problems for Jennifer. Specifically, it is obvious by the abrupt rise in her ferritin over the last few months that she is not getting the Jadenu (I trust she is taking it when she has it and have seen ferritin trends to support it). She has not even received a transfusion during this time. Our care coordinator Julieta and I are reaching out to pharmacy to see if we can improve this situation. In the current state, this is actually really increasing her morbidity and even mortality risks, and I do not think we should be in this situation for iatrogenic/system reasons. If we cannot get this squared away ASAP, I will consider doing in-clinic IV chelation, though that is less-than-ideal during the pandemic.  3. High RBC turnover: Jennifer really has a high degree of RBC turnover, more than most patients I have seen. I am going to see whether Oxbryta can be started to see if we can improve this--it could reduce the degree of iron overload as well.  4. Refills: Tylenol, ibuprofen, and oxycodone. Oxybryta Rx sent  5. Follow up: 2 weeks with ANDREAS. Will need to determine by December whether we need to do IV chelation and whether Oxbryta will be covered.     I spent a total of 26 minutes face-to-face with Jennifer Cervantes during today's office visit. Over 50% of the time was spent discussing how we may work to improve the pharmacy issues and counseling on the pros and cons to our current pain management strategies so she could understand. See note for details.    Eric Duncan MD  Hematologist  Division of Hematology, Oncology, and Transplantation  [facility]  [facility]  Pager: [phone]

## 2020-11-23 ENCOUNTER — TELEPHONE (OUTPATIENT)
Dept: ONCOLOGY | Facility: CLINIC | Age: 20
End: 2020-11-23

## 2020-11-23 NOTE — TELEPHONE ENCOUNTER
PA Initiation    Medication: Oxbryta - Submitted  Insurance Company: [employer] - Phone [phone] Fax [phone]  Pharmacy Filling the Rx:    Filling Pharmacy Phone:    Filling Pharmacy Fax:    Start Date: 11/23/2020        Caron Martinez CPhT  [facility]  Oncology Pharmacy Liaison  [email or web address]  Phone: [phone]  Fax: [phone]

## 2020-11-24 NOTE — TELEPHONE ENCOUNTER
Prior Authorization Approval    Authorization Effective Date: 11/24/2020  Authorization Expiration Date: 5/23/2021  Medication: Oxbryta - APPROVED  Approved Dose/Quantity:     Reference #:     Insurance Company: [employer] - Phone [phone] Fax [phone]  Expected CoPay:       CoPay Card Available:      Foundation Assistance Needed:    Which Pharmacy is filling the prescription (Not needed for infusion/clinic administered):    Pharmacy Notified:    Patient Notified:          Caron Martinez CPhT  [facility]  Oncology Pharmacy Liaison  [email or web address]  Phone: [phone]  Fax: [phone]

## 2020-12-02 DIAGNOSIS — D57.1 HB-SS DISEASE WITHOUT CRISIS (H): ICD-10-CM

## 2020-12-02 RX ORDER — OXYCODONE HYDROCHLORIDE 10 MG/1
10 TABLET, FILM COATED, EXTENDED RELEASE ORAL EVERY 12 HOURS
Qty: 60 TABLET | Refills: 0 | Status: SHIPPED | OUTPATIENT
Start: 2020-12-02 | End: 2020-12-29

## 2020-12-02 NOTE — TELEPHONE ENCOUNTER
Refill Request    Date of most recent appointment:  11/20  Next upcoming appointment:   12/04    Medication requested:  Oxycontin 10 mg  Quantity:  60 tablets  Last fill date:  11/2  Person requesting refill:  Patient  Notes:  Patient states she has two tablets left.     Prescribing provider(s):  Amalia Danielle

## 2020-12-03 ENCOUNTER — TELEPHONE (OUTPATIENT)
Dept: ONCOLOGY | Facility: CLINIC | Age: 20
End: 2020-12-03

## 2020-12-03 ENCOUNTER — HOSPITAL ENCOUNTER (EMERGENCY)
Facility: CLINIC | Age: 20
Discharge: HOME | End: 2020-12-04
Attending: EMERGENCY MEDICINE | Admitting: EMERGENCY MEDICINE
Payer: COMMERCIAL

## 2020-12-03 ENCOUNTER — APPOINTMENT (OUTPATIENT)
Dept: GENERAL RADIOLOGY | Facility: CLINIC | Age: 20
End: 2020-12-03
Attending: EMERGENCY MEDICINE
Payer: COMMERCIAL

## 2020-12-03 DIAGNOSIS — D57.00 SICKLE CELL PAIN CRISIS (H): ICD-10-CM

## 2020-12-03 DIAGNOSIS — R07.9 CHEST PAIN, UNSPECIFIED TYPE: ICD-10-CM

## 2020-12-03 LAB
ALBUMIN SERPL-MCNC: 4.2 G/DL (ref 3.4–5)
ALP SERPL-CCNC: 74 U/L (ref 40–150)
ALT SERPL W P-5'-P-CCNC: 83 U/L (ref 0–50)
ANION GAP SERPL CALCULATED.3IONS-SCNC: 6 MMOL/L (ref 3–14)
AST SERPL W P-5'-P-CCNC: 83 U/L (ref 0–45)
BASOPHILS # BLD AUTO: 0.2 10E9/L (ref 0–0.2)
BASOPHILS NFR BLD AUTO: 1.5 %
BILIRUB SERPL-MCNC: 3.9 MG/DL (ref 0.2–1.3)
BUN SERPL-MCNC: 14 MG/DL (ref 7–30)
CALCIUM SERPL-MCNC: 8.6 MG/DL (ref 8.5–10.1)
CHLORIDE SERPL-SCNC: 109 MMOL/L (ref 94–109)
CO2 SERPL-SCNC: 24 MMOL/L (ref 20–32)
CREAT SERPL-MCNC: 0.62 MG/DL (ref 0.52–1.04)
DIFFERENTIAL METHOD BLD: ABNORMAL
EOSINOPHIL # BLD AUTO: 0.7 10E9/L (ref 0–0.7)
EOSINOPHIL NFR BLD AUTO: 4.9 %
ERYTHROCYTE [DISTWIDTH] IN BLOOD BY AUTOMATED COUNT: 22.3 % (ref 10–15)
GFR SERPL CREATININE-BSD FRML MDRD: >90 ML/MIN/{1.73_M2}
GLUCOSE SERPL-MCNC: 90 MG/DL (ref 70–99)
HCG SERPL QL: NEGATIVE
HCT VFR BLD AUTO: 20.7 % (ref 35–47)
HGB BLD-MCNC: 7.5 G/DL (ref 11.7–15.7)
IMM GRANULOCYTES # BLD: 0.1 10E9/L (ref 0–0.4)
IMM GRANULOCYTES NFR BLD: 0.5 %
LYMPHOCYTES # BLD AUTO: 3.2 10E9/L (ref 0.8–5.3)
LYMPHOCYTES NFR BLD AUTO: 22.7 %
MCH RBC QN AUTO: 33 PG (ref 26.5–33)
MCHC RBC AUTO-ENTMCNC: 36.2 G/DL (ref 31.5–36.5)
MCV RBC AUTO: 91 FL (ref 78–100)
MONOCYTES # BLD AUTO: 0.9 10E9/L (ref 0–1.3)
MONOCYTES NFR BLD AUTO: 6.7 %
NEUTROPHILS # BLD AUTO: 8.8 10E9/L (ref 1.6–8.3)
NEUTROPHILS NFR BLD AUTO: 63.7 %
NRBC # BLD AUTO: 0.4 10*3/UL
NRBC BLD AUTO-RTO: 3 /100
PLATELET # BLD AUTO: 296 10E9/L (ref 150–450)
POTASSIUM SERPL-SCNC: 4 MMOL/L (ref 3.4–5.3)
PROT SERPL-MCNC: 8 G/DL (ref 6.8–8.8)
RBC # BLD AUTO: 2.27 10E12/L (ref 3.8–5.2)
RETICS # AUTO: 351.2 10E9/L (ref 25–95)
RETICS/RBC NFR AUTO: 15.7 % (ref 0.5–2)
SODIUM SERPL-SCNC: 139 MMOL/L (ref 133–144)
WBC # BLD AUTO: 13.9 10E9/L (ref 4–11)

## 2020-12-03 PROCEDURE — 96361 HYDRATE IV INFUSION ADD-ON: CPT | Performed by: EMERGENCY MEDICINE

## 2020-12-03 PROCEDURE — 99285 EMERGENCY DEPT VISIT HI MDM: CPT | Mod: 25 | Performed by: EMERGENCY MEDICINE

## 2020-12-03 PROCEDURE — 84703 CHORIONIC GONADOTROPIN ASSAY: CPT | Performed by: EMERGENCY MEDICINE

## 2020-12-03 PROCEDURE — 80053 COMPREHEN METABOLIC PANEL: CPT | Performed by: EMERGENCY MEDICINE

## 2020-12-03 PROCEDURE — 250N000011 HC RX IP 250 OP 636: Performed by: EMERGENCY MEDICINE

## 2020-12-03 PROCEDURE — 85025 COMPLETE CBC W/AUTO DIFF WBC: CPT | Performed by: EMERGENCY MEDICINE

## 2020-12-03 PROCEDURE — 85045 AUTOMATED RETICULOCYTE COUNT: CPT | Performed by: EMERGENCY MEDICINE

## 2020-12-03 PROCEDURE — 93005 ELECTROCARDIOGRAM TRACING: CPT | Performed by: EMERGENCY MEDICINE

## 2020-12-03 PROCEDURE — 250N000013 HC RX MED GY IP 250 OP 250 PS 637: Performed by: EMERGENCY MEDICINE

## 2020-12-03 PROCEDURE — 71046 X-RAY EXAM CHEST 2 VIEWS: CPT | Mod: 26 | Performed by: RADIOLOGY

## 2020-12-03 PROCEDURE — 96374 THER/PROPH/DIAG INJ IV PUSH: CPT | Performed by: EMERGENCY MEDICINE

## 2020-12-03 PROCEDURE — 93010 ELECTROCARDIOGRAM REPORT: CPT | Performed by: EMERGENCY MEDICINE

## 2020-12-03 PROCEDURE — 71046 X-RAY EXAM CHEST 2 VIEWS: CPT

## 2020-12-03 PROCEDURE — 258N000003 HC RX IP 258 OP 636: Performed by: EMERGENCY MEDICINE

## 2020-12-03 PROCEDURE — 96375 TX/PRO/DX INJ NEW DRUG ADDON: CPT | Performed by: EMERGENCY MEDICINE

## 2020-12-03 RX ORDER — ONDANSETRON 2 MG/ML
4 INJECTION INTRAMUSCULAR; INTRAVENOUS EVERY 30 MIN PRN
Status: COMPLETED | OUTPATIENT
Start: 2020-12-03 | End: 2020-12-04

## 2020-12-03 RX ORDER — SODIUM CHLORIDE, SODIUM LACTATE, POTASSIUM CHLORIDE, CALCIUM CHLORIDE 600; 310; 30; 20 MG/100ML; MG/100ML; MG/100ML; MG/100ML
1000 INJECTION, SOLUTION INTRAVENOUS CONTINUOUS
Status: DISCONTINUED | OUTPATIENT
Start: 2020-12-03 | End: 2020-12-04 | Stop reason: HOSPADM

## 2020-12-03 RX ORDER — MORPHINE SULFATE 2 MG/ML
2 INJECTION, SOLUTION INTRAMUSCULAR; INTRAVENOUS
Status: COMPLETED | OUTPATIENT
Start: 2020-12-03 | End: 2020-12-04

## 2020-12-03 RX ORDER — KETOROLAC TROMETHAMINE 15 MG/ML
15 INJECTION, SOLUTION INTRAMUSCULAR; INTRAVENOUS ONCE
Status: COMPLETED | OUTPATIENT
Start: 2020-12-03 | End: 2020-12-03

## 2020-12-03 RX ORDER — DIPHENHYDRAMINE HCL 50 MG
50 CAPSULE ORAL ONCE
Status: COMPLETED | OUTPATIENT
Start: 2020-12-03 | End: 2020-12-03

## 2020-12-03 RX ADMIN — SODIUM CHLORIDE, POTASSIUM CHLORIDE, SODIUM LACTATE AND CALCIUM CHLORIDE 1000 ML: 600; 310; 30; 20 INJECTION, SOLUTION INTRAVENOUS at 22:43

## 2020-12-03 RX ADMIN — MORPHINE SULFATE 2 MG: 2 INJECTION, SOLUTION INTRAMUSCULAR; INTRAVENOUS at 22:43

## 2020-12-03 RX ADMIN — KETOROLAC TROMETHAMINE 15 MG: 15 INJECTION, SOLUTION INTRAMUSCULAR; INTRAVENOUS at 22:43

## 2020-12-03 RX ADMIN — DIPHENHYDRAMINE HYDROCHLORIDE 50 MG: 50 CAPSULE ORAL at 22:43

## 2020-12-03 RX ADMIN — ONDANSETRON 4 MG: 2 INJECTION INTRAMUSCULAR; INTRAVENOUS at 22:43

## 2020-12-03 ASSESSMENT — MIFFLIN-ST. JEOR: SCORE: 1466.68

## 2020-12-03 NOTE — TELEPHONE ENCOUNTER
Unable to accommodate ANDREAS visit today, per Dr. Perla redd for IVF pain meds with labs prior. Keep ANDREAS visit tomorrow. Pt still on infusion waiting list.

## 2020-12-03 NOTE — ED AVS SNAPSHOT
[facility] Emergency Department  [address] [address]  Phone: [phone]                                    Jennifer Cervantes   MRN: [id number]    Department: [facility] Emergency Department   Date of Visit: 12/3/2020           After Visit Summary Signature Page    I have received my discharge instructions, and my questions have been answered. I have discussed any challenges I see with this plan with the nurse or doctor.    ..........................................................................................................................................  Patient/Patient Representative Signature      ..........................................................................................................................................  Patient Representative Print Name and Relationship to Patient    ..................................................               ................................................  Date                                   Time    ..........................................................................................................................................  Reviewed by Signature/Title    ...................................................              ..............................................  Date                                               Time          22EPIC Rev 08/18

## 2020-12-03 NOTE — TELEPHONE ENCOUNTER
Per Dr. Duncan: ok for IVF pain meds today if available. If not, schedule with ANDREAS virtual today, set up labs/chest xray/IVF pain meds tomorrow.

## 2020-12-03 NOTE — TELEPHONE ENCOUNTER
Pt called in to triage requesting IVF pain meds for back, bilateral arms and legs pain rated 6-8/10 x3 days. New pain in chest/sternum off to the side of left port also sharp rated 6/10, denied any sob, redness, swelling or warmth over port. State she can ambulate normally, denied weakness or dizziness.    Stated last took prn Oxy IR 10mg and ibuprofen this morning without relief. Denied any fevers, chills, cough or other symptoms. Pt's last infusion was 11/17, last clinic visit 11/20 with follow-up on 12/4 with [facility]. Pt denied being out of home medications and needing any refills today. Can get a ride to clinic. Dr. Duncan paged.    Pt informed there is a wait list for cancellations today before being able to offer an appointment. Informed if able to treat at home effectively clinic will call back if any openings become available. If pt is unable to control pain at home, recommend proceeding to the ED for evaluation. Pt verbalized understanding.

## 2020-12-04 VITALS
SYSTOLIC BLOOD PRESSURE: 131 MMHG | HEART RATE: 107 BPM | TEMPERATURE: 98.2 F | OXYGEN SATURATION: 96 % | BODY MASS INDEX: 26.98 KG/M2 | DIASTOLIC BLOOD PRESSURE: 83 MMHG | HEIGHT: 64 IN | RESPIRATION RATE: 20 BRPM | WEIGHT: 158 LBS

## 2020-12-04 DIAGNOSIS — E83.111 IRON OVERLOAD DUE TO REPEATED RED BLOOD CELL TRANSFUSIONS: ICD-10-CM

## 2020-12-04 LAB — INTERPRETATION ECG - MUSE: NORMAL

## 2020-12-04 PROCEDURE — 96376 TX/PRO/DX INJ SAME DRUG ADON: CPT | Performed by: EMERGENCY MEDICINE

## 2020-12-04 PROCEDURE — 96361 HYDRATE IV INFUSION ADD-ON: CPT | Performed by: EMERGENCY MEDICINE

## 2020-12-04 PROCEDURE — 258N000003 HC RX IP 258 OP 636: Performed by: EMERGENCY MEDICINE

## 2020-12-04 PROCEDURE — 250N000011 HC RX IP 250 OP 636: Performed by: EMERGENCY MEDICINE

## 2020-12-04 RX ORDER — DEFERASIROX 360 MG/1
1440 TABLET, FILM COATED ORAL DAILY
Qty: 120 TABLET | Refills: 4 | Status: CANCELLED | OUTPATIENT
Start: 2020-12-04

## 2020-12-04 RX ORDER — HEPARIN SODIUM (PORCINE) LOCK FLUSH IV SOLN 100 UNIT/ML 100 UNIT/ML
500 SOLUTION INTRAVENOUS ONCE
Status: COMPLETED | OUTPATIENT
Start: 2020-12-04 | End: 2020-12-04

## 2020-12-04 RX ORDER — DEFERASIROX 360 MG/1
1440 TABLET, FILM COATED ORAL DAILY
Qty: 120 TABLET | Refills: 4 | Status: ON HOLD | OUTPATIENT
Start: 2020-12-04 | End: 2021-03-16

## 2020-12-04 RX ADMIN — ONDANSETRON 4 MG: 2 INJECTION INTRAMUSCULAR; INTRAVENOUS at 03:42

## 2020-12-04 RX ADMIN — ONDANSETRON 4 MG: 2 INJECTION INTRAMUSCULAR; INTRAVENOUS at 01:12

## 2020-12-04 RX ADMIN — Medication 500 UNITS: at 03:42

## 2020-12-04 RX ADMIN — MORPHINE SULFATE 2 MG: 2 INJECTION, SOLUTION INTRAMUSCULAR; INTRAVENOUS at 03:42

## 2020-12-04 RX ADMIN — SODIUM CHLORIDE, POTASSIUM CHLORIDE, SODIUM LACTATE AND CALCIUM CHLORIDE 1000 ML: 600; 310; 30; 20 INJECTION, SOLUTION INTRAVENOUS at 01:12

## 2020-12-04 RX ADMIN — MORPHINE SULFATE 2 MG: 2 INJECTION, SOLUTION INTRAMUSCULAR; INTRAVENOUS at 01:12

## 2020-12-04 ASSESSMENT — ENCOUNTER SYMPTOMS
ABDOMINAL PAIN: 0
BACK PAIN: 1
HEADACHES: 0
CHILLS: 0
NAUSEA: 0
FEVER: 0
SHORTNESS OF BREATH: 0
NERVOUS/ANXIOUS: 0
VOMITING: 0
DIFFICULTY URINATING: 0
NECK PAIN: 0

## 2020-12-04 NOTE — ED PROVIDER NOTES
"Patient received in signout from prior attending.  Briefly this is a 20-year-old patient with sickle cell disease who came in with clinical symptomatology consistent with sickle cell crisis.  She was started on her typical protocol.  Received 2 doses of her prescribed opiate felt significantly improved.  Reevaluation at 3:20 AM pain is much better she is eager for discharge.  His she has remained hemodynamically stable and well-appearing.  Appropriate for discharge with outpatient follow-up.  Patient agrees.    /68   Pulse 115   Temp 98.2  F (36.8  C) (Oral)   Resp 18   Ht 1.626 m (5' 4\")   Wt 71.7 kg (158 lb)   SpO2 99%   BMI 27.12 kg/m         Deo Schmid MD  12/04/20 0326    "

## 2020-12-04 NOTE — TELEPHONE ENCOUNTER
Chart showed pt went to ED yesterday, called her to follow-up if she feels she still needs IVF pain meds today and remind her of virtual visit with Pavithra Lutz ANDREAS at 3pm. Pt stated she felt a lot better after morphine IVF in ED, she has not taken her oral pain meds yet. Will take oxy IR and oxycontin within the hour, pain rated 6/10. State chest wall pain is better, no clear answers from ED visit why she was having this pain, stated she was told not related to acute chest syndrome or port. She feels she would still like to get in for outpatient infusion today if she could and aware to keep line open for call check in at 2:30 pm for her visit with Pavithra at 3pm.     Informed her Dr. Duncan was ok with her getting more IVF pain meds today, she is first on the infusion wait list if something cancels. Even if she gets scheduled around the same time as her apt with Pavithra, visit will still be possible since it's virtual, she verbalized understanding.

## 2020-12-04 NOTE — DISCHARGE INSTRUCTIONS
Follow-up with your hematologist and return to the emergency department for any new or worsening symptoms.

## 2020-12-04 NOTE — ED PROVIDER NOTES
[place] EMERGENCY DEPARTMENT ([facility])  December 3, 2020  ED 25 10:23 PM   History     Chief Complaint   Patient presents with     Sickle Cell Pain Crisis     Chest Pain     The history is provided by the patient and medical records.     Jennifer Cervantes is a 20 year old female who presents for further evaluation of sickle cell pain flare for the past 3 days. She has tried all of her home medications, including oxycodone immediate release 10 mg and ibuprofen this morning without improvement.    She tried calling into her hematology clinic for IV fluids but they were unable to get her in.  Here she endorses pain in her back, bilateral arms and legs that is a 6-8/10 in intensity.  She also has a new pain in her chest just to the side of her left port that is 6/10.  No redness or warmth over the port.  She feels a little short of breath.  She states that this pain is worse than her usual sickle cell pain flares and that she does not typically get chest pain with her sickle cell pain flares.  She states that this chest pain is new for her. No cough, cold, fever, or flu symptoms. No nausea, vomiting, or diarrhea. She is eating and drinking ok, using bathroom normally. No history of DVT or PE.     PAST MEDICAL HISTORY:   Past Medical History:   Diagnosis Date     Anemia      Anxiety      Bleeding disorder (H)      Blood clotting disorder (H)      Cerebral infarction (H) 2015     Cognitive developmental delay     low IQ. 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 (H)     right hand contractures     Iron overload due to repeated red blood cell transfusions      Migraines      Oppositional defiant behavior      Uncomplicated asthma        PAST SURGICAL HISTORY:   Past Surgical History:   Procedure Laterality Date     AS INSERT TUNNELED CV 2 CATH W/O PORT/PUMP       C BREAST REDUCTION  (INCLUDES LIPO) TIER 3 Bilateral 04/23/2019     IR CVC NON TUNNEL PLACEMENT  4/7/2020     REPAIR TENDON ELBOW Right 10/2/2019    Procedure: Right Elbow Flexor Lengthening, Flexor Pronator Slide Of Wrist and Finger, Thumb Adductor Lengthening;  Surgeon: Anai Franco MD;  Location: [facility] OR     TONSILLECTOMY Bilateral 10/2/2019    Procedure: Bilateral Tonsillectomy;  Surgeon: Farhana Guy MD;  Location: [facility] OR       Past medical history, past surgical history, medications, and allergies were reviewed with the patient. Additional pertinent items: None    FAMILY HISTORY:   Family History   Problem Relation Age of Onset     Sickle Cell Trait Mother      Sickle Cell Trait Father        SOCIAL HISTORY:   Social History     Tobacco Use     Smoking status: Never Smoker     Smokeless tobacco: Never Used   Substance Use Topics     Alcohol use: Not Currently     Alcohol/week: 0.0 standard drinks     Social history was reviewed with the patient. Additional pertinent items: None      Patient's Medications   New Prescriptions    No medications on file   Previous Medications    ACETAMINOPHEN (TYLENOL) 325 MG TABLET    Take 2 tablets (650 mg) by mouth every 6 hours as needed for mild pain    ALBUTEROL (PROVENTIL) (2.5 MG/3ML) 0.083% NEB SOLUTION    Take 1 vial (2.5 mg) by nebulization every 6 hours as needed for shortness of breath / dyspnea or wheezing    ASPIRIN (ASPIRIN) 81 MG EC TABLET    Take 1 tablet (81 mg) by mouth daily    BUDESONIDE-FORMOTEROL FUMARATE (SYMBICORT IN)    Inhale  into the lungs.    CELECOXIB (CELEBREX) 100 MG CAPSULE    Take 1 capsule (100 mg) by mouth 2 times daily    DIPHENHYDRAMINE (BENADRYL) 25 MG CAPSULE    Take 1-2 capsules (25-50 mg) by mouth nightly as needed for sleep    HYDROXYUREA 1000 MG TABS    Take 2,000 mg by mouth daily    IBUPROFEN (ADVIL/MOTRIN) 200 MG TABLET    Take 3 tablets (600 mg) by mouth every 6 hours as needed for moderate pain    JADENU 360 MG TABLET    Take 4  "tablets (1,440 mg) by mouth daily    MEDROXYPROGESTERONE (DEPO-PROVERA) 150 MG/ML IM INJECTION    Inject 150 mg into the muscle    NALOXONE (NARCAN) 4 MG/0.1ML NASAL SPRAY    Spray 1 spray (4 mg) into one nostril alternating nostrils once as needed for opioid reversal every 2-3 minutes until assistance arrives    ONDANSETRON (ZOFRAN) 8 MG TABLET        OXYCODONE IR (ROXICODONE) 10 MG TABLET    Take 1 tablet (10mg) by mouth every 6 hours for pain. If, after 2 doses it is not working, try a dose at 4 hours and call clinic.    OXYCONTIN 10 MG 12 HR TABLET    Take 1 tablet (10 mg) by mouth every 12 hours    SERTRALINE (ZOLOFT) 100 MG TABLET    Take 2 tablets (200 mg) by mouth daily    VOXELOTOR 500 MG TABS    Take 1,500 mg by mouth daily   Modified Medications    No medications on file   Discontinued Medications    No medications on file          Allergies   Allergen Reactions     Fish-Derived Products Hives     Seafood Hives     Contrast Dye      Diagnostic X-Ray Materials      Gadolinium         Review of Systems   Constitutional: Negative for chills and fever.   Respiratory: Negative for shortness of breath.    Cardiovascular: Positive for chest pain.   Gastrointestinal: Negative for abdominal pain, nausea and vomiting.   Genitourinary: Negative for difficulty urinating.   Musculoskeletal: Positive for back pain. Negative for neck pain.        Leg pain     Neurological: Negative for headaches.   Psychiatric/Behavioral: The patient is not nervous/anxious.      A complete review of systems was performed with pertinent positives and negatives noted in the HPI, and all other systems negative.    Physical Exam   BP: 124/73  Pulse: 99  Temp: 98.2  F (36.8  C)  Resp: 16  Height: 162.6 cm (5' 4\")  Weight: 71.7 kg (158 lb)  SpO2: 97 %      Physical Exam  Vitals signs and nursing note reviewed.   Constitutional:       General: She is not in acute distress.     Appearance: Normal appearance. She is not ill-appearing or " toxic-appearing.   HENT:      Head: Normocephalic and atraumatic.      Nose: Nose normal.      Mouth/Throat:      Mouth: Mucous membranes are moist.   Eyes:      Pupils: Pupils are equal, round, and reactive to light.   Neck:      Musculoskeletal: Normal range of motion. No neck rigidity.   Cardiovascular:      Rate and Rhythm: Normal rate.      Pulses: Normal pulses.      Heart sounds: Normal heart sounds.   Pulmonary:      Effort: Pulmonary effort is normal. No respiratory distress.      Breath sounds: Normal breath sounds. No stridor. No wheezing or rales.   Chest:      Chest wall: No tenderness.   Abdominal:      General: Abdomen is flat. There is no distension.   Musculoskeletal: Normal range of motion.         General: No swelling or deformity.   Skin:     General: Skin is warm.      Capillary Refill: Capillary refill takes less than 2 seconds.   Neurological:      Mental Status: She is alert and oriented to person, place, and time.   Psychiatric:         Mood and Affect: Mood normal.         ED Course        Procedures             EKG Interpretation:      Interpreted by Raul Diaz DO  Time reviewed: 1950  Symptoms at time of EKG: chest pain   Rhythm: sinus tachycardia   Rate: 102  Axis: normal  Ectopy: none  Conduction: normal  ST Segments/ T Waves: No ST-T wave changes  Q Waves: none  Comparison to prior: No old EKG available    Clinical Impression: sinus tachycardia, otherwise normal EKG                        Results for orders placed or performed during the hospital encounter of 12/03/20 (from the past 24 hour(s))   CBC with platelets differential   Result Value Ref Range    WBC 13.9 (H) 4.0 - 11.0 10e9/L    RBC Count 2.27 (L) 3.8 - 5.2 10e12/L    Hemoglobin 7.5 (L) 11.7 - 15.7 g/dL    Hematocrit 20.7 (L) 35.0 - 47.0 %    MCV 91 78 - 100 fl    MCH 33.0 26.5 - 33.0 pg    MCHC 36.2 31.5 - 36.5 g/dL    RDW 22.3 (H) 10.0 - 15.0 %    Platelet Count 296 150 - 450 10e9/L    Diff Method Automated Method     %  Neutrophils 63.7 %    % Lymphocytes 22.7 %    % Monocytes 6.7 %    % Eosinophils 4.9 %    % Basophils 1.5 %    % Immature Granulocytes 0.5 %    Nucleated RBCs 3 (H) 0 /100    Absolute Neutrophil 8.8 (H) 1.6 - 8.3 10e9/L    Absolute Lymphocytes 3.2 0.8 - 5.3 10e9/L    Absolute Monocytes 0.9 0.0 - 1.3 10e9/L    Absolute Eosinophils 0.7 0.0 - 0.7 10e9/L    Absolute Basophils 0.2 0.0 - 0.2 10e9/L    Abs Immature Granulocytes 0.1 0 - 0.4 10e9/L    Absolute Nucleated RBC 0.4    Comprehensive metabolic panel   Result Value Ref Range    Sodium 139 133 - 144 mmol/L    Potassium 4.0 3.4 - 5.3 mmol/L    Chloride 109 94 - 109 mmol/L    Carbon Dioxide 24 20 - 32 mmol/L    Anion Gap 6 3 - 14 mmol/L    Glucose 90 70 - 99 mg/dL    Urea Nitrogen 14 7 - 30 mg/dL    Creatinine 0.62 0.52 - 1.04 mg/dL    GFR Estimate >90 >60 mL/min/[1.73_m2]    GFR Estimate If Black >90 >60 mL/min/[1.73_m2]    Calcium 8.6 8.5 - 10.1 mg/dL    Bilirubin Total 3.9 (H) 0.2 - 1.3 mg/dL    Albumin 4.2 3.4 - 5.0 g/dL    Protein Total 8.0 6.8 - 8.8 g/dL    Alkaline Phosphatase 74 40 - 150 U/L    ALT 83 (H) 0 - 50 U/L    AST 83 (H) 0 - 45 U/L   HCG qualitative Blood   Result Value Ref Range    HCG Qualitative Serum Negative NEG^Negative   Reticulocyte count   Result Value Ref Range    % Retic 15.7 (H) 0.5 - 2.0 %    Absolute Retic 351.2 (H) 25 - 95 10e9/L   EKG 12-lead, complete   Result Value Ref Range    Interpretation ECG Click View Image link to view waveform and result    XR Chest 2 Views    Narrative    EXAM: XR CHEST 2 VW  LOCATION: [facility]  DATE/TIME: 12/3/2020 11:22 PM    INDICATION: Chest pain. Sickle cell crisis.    COMPARISON: 4/7/2020.    FINDINGS: Left chest wall port. No pneumothorax. The heart size is normal. The lungs are clear. No pleural effusion.      Impression    IMPRESSION: No acute abnormality.     Medications   morphine (PF) injection 2 mg (2 mg Intravenous Given 12/4/20 0112)   lactated ringers infusion (1,000 mLs  Intravenous New Bag 12/4/20 0112)   ondansetron (ZOFRAN) injection 4 mg (4 mg Intravenous Given 12/4/20 0112)   lactated ringers BOLUS 1,000 mL (0 mLs Intravenous Stopped 12/4/20 0112)   ketorolac (TORADOL) injection 15 mg (15 mg Intravenous Given 12/3/20 2243)   diphenhydrAMINE (BENADRYL) capsule 50 mg (50 mg Oral Given 12/3/20 2243)             Assessments & Plan (with Medical Decision Making)   20-year-old female with history of sickle cell disease presents to emergency department with diffuse pain.  Pain is located in her shoulders radiates into her neck and down the front part of her chest.  She says that it is similar to sickle cell pain but more severe.  Unrelieved with oxycodone ibuprofen at home.    Differential diagnosis includes sickle cell pain crisis, vaso-occlusive crisis, acute chest syndrome, ACS    On arrival, patient mildly tachycardic, otherwise has normal vital signs.  On exam, appears uncomfortable due to the pain but otherwise nontoxic.  On physical exam her chest wall shoulders and neck are nontender.  Lower extremities without swelling or signs of DVT.    Plan for basic sickle cell labs including CBC, CMP, reticulocyte count.  Given possibility of acute chest wall disorder EKG, chest x-ray.  Of note, patient nonhypoxic on room air.  Will give pain medications per patient's protocol.    Meds: 2 mg IV morphine every hour x3, 50 mg p.o. Benadryl, 50 mg IV Toradol, 4 mg IV Zofran as needed    Patient reassessed after 2 dose of morphine.  Feels improved, but still not ready for discharge.  Laboratory work-up is otherwise unremarkable.  Chest x-ray clear.  Vital signs remained stable.  Symptoms likely consistent with sickle cell pain crisis.  Will sign out to overnight provider.  Anticipate discharge home with hematology follow-up.        I have reviewed the nursing notes.    I have reviewed the findings, diagnosis, plan and need for follow up with the patient.    New Prescriptions    No  medications on file       Final diagnoses:   Sickle cell pain crisis (H)   IBambi, am serving as a trained medical scribe to document services personally performed by Raul Diaz DO based on the provider's statements to me on December 3, 2020.  This document has been checked and approved by the attending provider.    Raul NEWMAN DO, was physically present and have reviewed and verified the accuracy of this note documented by Bambi Tejeda, medical scribe.       12/3/2020   [facility] EMERGENCY DEPARTMENT     Raul Diaz DO  12/04/20 0148

## 2020-12-04 NOTE — TELEPHONE ENCOUNTER
Call placed to [facility] as per chart review this was refilled 11/6/20 for #120 with four refills. Per pharmacy, the patient has no refills on file and a new script is needed.

## 2020-12-04 NOTE — ED TRIAGE NOTES
Pt c/o sickle cell pain and sharp left anterior chest pain which started 2 nights ago. Denies n/v/d, SOB or fever

## 2020-12-07 ENCOUNTER — HOSPITAL ENCOUNTER (EMERGENCY)
Facility: CLINIC | Age: 20
Discharge: HOME | End: 2020-12-07
Attending: EMERGENCY MEDICINE | Admitting: EMERGENCY MEDICINE
Payer: COMMERCIAL

## 2020-12-07 ENCOUNTER — TELEPHONE (OUTPATIENT)
Dept: ONCOLOGY | Facility: CLINIC | Age: 20
End: 2020-12-07

## 2020-12-07 ENCOUNTER — APPOINTMENT (OUTPATIENT)
Dept: GENERAL RADIOLOGY | Facility: CLINIC | Age: 20
End: 2020-12-07
Attending: EMERGENCY MEDICINE
Payer: COMMERCIAL

## 2020-12-07 VITALS
RESPIRATION RATE: 16 BRPM | HEIGHT: 64 IN | OXYGEN SATURATION: 91 % | TEMPERATURE: 98.5 F | SYSTOLIC BLOOD PRESSURE: 133 MMHG | BODY MASS INDEX: 26.46 KG/M2 | HEART RATE: 98 BPM | DIASTOLIC BLOOD PRESSURE: 84 MMHG | WEIGHT: 155 LBS

## 2020-12-07 DIAGNOSIS — D57.00 SICKLE CELL CRISIS (H): ICD-10-CM

## 2020-12-07 DIAGNOSIS — D57.1 HB-SS DISEASE WITHOUT CRISIS (H): ICD-10-CM

## 2020-12-07 DIAGNOSIS — D57.00 SICKLE CELL PAIN CRISIS (H): ICD-10-CM

## 2020-12-07 LAB
ALBUMIN SERPL-MCNC: 3.8 G/DL (ref 3.4–5)
ALP SERPL-CCNC: 71 U/L (ref 40–150)
ALT SERPL W P-5'-P-CCNC: 81 U/L (ref 0–50)
ANION GAP SERPL CALCULATED.3IONS-SCNC: 6 MMOL/L (ref 3–14)
ANISOCYTOSIS BLD QL SMEAR: ABNORMAL
AST SERPL W P-5'-P-CCNC: 101 U/L (ref 0–45)
BASOPHILS # BLD AUTO: 0.1 10E9/L (ref 0–0.2)
BASOPHILS NFR BLD AUTO: 0.9 %
BILIRUB SERPL-MCNC: 3 MG/DL (ref 0.2–1.3)
BUN SERPL-MCNC: 6 MG/DL (ref 7–30)
CALCIUM SERPL-MCNC: 8.7 MG/DL (ref 8.5–10.1)
CHLORIDE SERPL-SCNC: 111 MMOL/L (ref 94–109)
CO2 SERPL-SCNC: 23 MMOL/L (ref 20–32)
CREAT SERPL-MCNC: 0.6 MG/DL (ref 0.52–1.04)
DIFFERENTIAL METHOD BLD: ABNORMAL
EOSINOPHIL # BLD AUTO: 0.8 10E9/L (ref 0–0.7)
EOSINOPHIL NFR BLD AUTO: 6.4 %
ERYTHROCYTE [DISTWIDTH] IN BLOOD BY AUTOMATED COUNT: 28.4 % (ref 10–15)
GFR SERPL CREATININE-BSD FRML MDRD: >90 ML/MIN/{1.73_M2}
GLUCOSE SERPL-MCNC: 99 MG/DL (ref 70–99)
HCT VFR BLD AUTO: 20.5 % (ref 35–47)
HGB BLD-MCNC: 7.1 G/DL (ref 11.7–15.7)
LYMPHOCYTES # BLD AUTO: 2.5 10E9/L (ref 0.8–5.3)
LYMPHOCYTES NFR BLD AUTO: 20.9 %
MACROCYTES BLD QL SMEAR: PRESENT
MCH RBC QN AUTO: 32.7 PG (ref 26.5–33)
MCHC RBC AUTO-ENTMCNC: 34.6 G/DL (ref 31.5–36.5)
MCV RBC AUTO: 95 FL (ref 78–100)
MONOCYTES # BLD AUTO: 0.6 10E9/L (ref 0–1.3)
MONOCYTES NFR BLD AUTO: 5.5 %
NEUTROPHILS # BLD AUTO: 7.8 10E9/L (ref 1.6–8.3)
NEUTROPHILS NFR BLD AUTO: 66.3 %
NRBC # BLD AUTO: 4 10*3/UL
NRBC BLD AUTO-RTO: 34 /100
PLATELET # BLD AUTO: 379 10E9/L (ref 150–450)
PLATELET # BLD EST: ABNORMAL 10*3/UL
POIKILOCYTOSIS BLD QL SMEAR: ABNORMAL
POLYCHROMASIA BLD QL SMEAR: ABNORMAL
POTASSIUM SERPL-SCNC: 3.5 MMOL/L (ref 3.4–5.3)
PROT SERPL-MCNC: 7.6 G/DL (ref 6.8–8.8)
RBC # BLD AUTO: 2.17 10E12/L (ref 3.8–5.2)
RETICS # AUTO: 506.9 10E9/L (ref 25–95)
RETICS/RBC NFR AUTO: 23.4 % (ref 0.5–2)
SICKLE CELLS BLD QL SMEAR: ABNORMAL
SODIUM SERPL-SCNC: 140 MMOL/L (ref 133–144)
TARGETS BLD QL SMEAR: ABNORMAL
WBC # BLD AUTO: 11.8 10E9/L (ref 4–11)

## 2020-12-07 PROCEDURE — 85025 COMPLETE CBC W/AUTO DIFF WBC: CPT | Performed by: EMERGENCY MEDICINE

## 2020-12-07 PROCEDURE — 71045 X-RAY EXAM CHEST 1 VIEW: CPT

## 2020-12-07 PROCEDURE — 80053 COMPREHEN METABOLIC PANEL: CPT | Performed by: EMERGENCY MEDICINE

## 2020-12-07 PROCEDURE — 96361 HYDRATE IV INFUSION ADD-ON: CPT | Performed by: EMERGENCY MEDICINE

## 2020-12-07 PROCEDURE — 99285 EMERGENCY DEPT VISIT HI MDM: CPT | Performed by: EMERGENCY MEDICINE

## 2020-12-07 PROCEDURE — 96374 THER/PROPH/DIAG INJ IV PUSH: CPT | Performed by: EMERGENCY MEDICINE

## 2020-12-07 PROCEDURE — 96376 TX/PRO/DX INJ SAME DRUG ADON: CPT | Performed by: EMERGENCY MEDICINE

## 2020-12-07 PROCEDURE — 85045 AUTOMATED RETICULOCYTE COUNT: CPT | Performed by: EMERGENCY MEDICINE

## 2020-12-07 PROCEDURE — 258N000003 HC RX IP 258 OP 636: Performed by: EMERGENCY MEDICINE

## 2020-12-07 PROCEDURE — 99285 EMERGENCY DEPT VISIT HI MDM: CPT | Mod: 25 | Performed by: EMERGENCY MEDICINE

## 2020-12-07 PROCEDURE — 96375 TX/PRO/DX INJ NEW DRUG ADDON: CPT | Performed by: EMERGENCY MEDICINE

## 2020-12-07 PROCEDURE — 71045 X-RAY EXAM CHEST 1 VIEW: CPT | Mod: 26

## 2020-12-07 PROCEDURE — 250N000011 HC RX IP 250 OP 636: Performed by: EMERGENCY MEDICINE

## 2020-12-07 RX ORDER — HEPARIN SODIUM,PORCINE 10 UNIT/ML
5-10 VIAL (ML) INTRAVENOUS
Status: DISCONTINUED | OUTPATIENT
Start: 2020-12-07 | End: 2020-12-08 | Stop reason: HOSPADM

## 2020-12-07 RX ORDER — LIDOCAINE 40 MG/G
CREAM TOPICAL
Status: DISCONTINUED | OUTPATIENT
Start: 2020-12-07 | End: 2020-12-08 | Stop reason: HOSPADM

## 2020-12-07 RX ORDER — SODIUM CHLORIDE 9 MG/ML
INJECTION, SOLUTION INTRAVENOUS CONTINUOUS
Status: DISCONTINUED | OUTPATIENT
Start: 2020-12-07 | End: 2020-12-08 | Stop reason: HOSPADM

## 2020-12-07 RX ORDER — ONDANSETRON 2 MG/ML
8 INJECTION INTRAMUSCULAR; INTRAVENOUS ONCE
Status: COMPLETED | OUTPATIENT
Start: 2020-12-07 | End: 2020-12-07

## 2020-12-07 RX ORDER — MORPHINE SULFATE 2 MG/ML
2 INJECTION, SOLUTION INTRAMUSCULAR; INTRAVENOUS
Status: DISCONTINUED | OUTPATIENT
Start: 2020-12-07 | End: 2020-12-08 | Stop reason: HOSPADM

## 2020-12-07 RX ORDER — HEPARIN SODIUM (PORCINE) LOCK FLUSH IV SOLN 100 UNIT/ML 100 UNIT/ML
5 SOLUTION INTRAVENOUS
Status: DISCONTINUED | OUTPATIENT
Start: 2020-12-07 | End: 2020-12-08 | Stop reason: HOSPADM

## 2020-12-07 RX ORDER — OXYCODONE HYDROCHLORIDE 10 MG/1
TABLET ORAL
Qty: 60 TABLET | Refills: 0 | Status: SHIPPED | OUTPATIENT
Start: 2020-12-07 | End: 2020-12-21

## 2020-12-07 RX ORDER — HEPARIN SODIUM,PORCINE 10 UNIT/ML
5-10 VIAL (ML) INTRAVENOUS EVERY 24 HOURS
Status: DISCONTINUED | OUTPATIENT
Start: 2020-12-07 | End: 2020-12-08 | Stop reason: HOSPADM

## 2020-12-07 RX ADMIN — SODIUM CHLORIDE, PRESERVATIVE FREE 5 ML: 5 INJECTION INTRAVENOUS at 22:56

## 2020-12-07 RX ADMIN — SODIUM CHLORIDE, PRESERVATIVE FREE 1000 ML: 5 INJECTION INTRAVENOUS at 19:59

## 2020-12-07 RX ADMIN — MORPHINE SULFATE 2 MG: 2 INJECTION, SOLUTION INTRAMUSCULAR; INTRAVENOUS at 21:09

## 2020-12-07 RX ADMIN — MORPHINE SULFATE 2 MG: 2 INJECTION, SOLUTION INTRAMUSCULAR; INTRAVENOUS at 22:12

## 2020-12-07 RX ADMIN — MORPHINE SULFATE 2 MG: 2 INJECTION, SOLUTION INTRAMUSCULAR; INTRAVENOUS at 19:59

## 2020-12-07 RX ADMIN — ONDANSETRON 8 MG: 2 INJECTION INTRAMUSCULAR; INTRAVENOUS at 19:59

## 2020-12-07 ASSESSMENT — ENCOUNTER SYMPTOMS
DIFFICULTY URINATING: 0
FEVER: 0
SHORTNESS OF BREATH: 0
EYE REDNESS: 0
CONFUSION: 0
COLOR CHANGE: 0
ARTHRALGIAS: 0
NECK STIFFNESS: 0
HEADACHES: 0
ABDOMINAL PAIN: 0
NECK PAIN: 1

## 2020-12-07 ASSESSMENT — MIFFLIN-ST. JEOR: SCORE: 1453.08

## 2020-12-07 NOTE — TELEPHONE ENCOUNTER
Pt called in to triage asking if ok for her to go to the ED later tonight. Stated she received her oxy refill and after taking 10 mg every 4 hours, feels pain is increasing instead of decreasing. State neck pain, back pain and her arms x1 day. She will try a warm bath, prn ibuprofen, scheduled celebrex and oxycontin at 8 pm then if not relieved will go to ED.    Informed pt she does not need permission from us to go to the ED, if pain is not manageable at home she may proceed sooner. She stated wait times are too long this time of day and she does not want to sit for hours in the lobby. She understands the clinic will close soon and she can't get an infusion today so will try 8pm meds when due, if still having pain will proceed to ED, or if pain is manageable will call in the morning if she feels she still needs an infusion. Care team informed.

## 2020-12-07 NOTE — ED AVS SNAPSHOT
[facility] Emergency Department  [address] [address]  Phone: [phone]                                    Jennifer Cervantes   MRN: [id number]    Department: [facility] Emergency Department   Date of Visit: 12/7/2020           After Visit Summary Signature Page    I have received my discharge instructions, and my questions have been answered. I have discussed any challenges I see with this plan with the nurse or doctor.    ..........................................................................................................................................  Patient/Patient Representative Signature      ..........................................................................................................................................  Patient Representative Print Name and Relationship to Patient    ..................................................               ................................................  Date                                   Time    ..........................................................................................................................................  Reviewed by Signature/Title    ...................................................              ..............................................  Date                                               Time          22EPIC Rev 08/18

## 2020-12-07 NOTE — TELEPHONE ENCOUNTER
Narcotic Refill Request    Medication(s) Requested:    Last refilled date and by whom: 11/20 for #60     reviewed: not a delegate under this prescriber     Next appointment: 12/9 with Cornelia Suresh ANDREAS    What pain is the medication treating: sickle cell    How is the medication being taken: 10 mg every 6 hours prn, state she has #2 tablets left and can make it last until 12/8 when she is coming to the [facility] for another apt for     Is pain being adequately controlled on the current regimen: yes, pain has been manageable since ED visit on 12/3    Experiencing any side effects from medication: no    FYI: pt missed 12/4 virtual visit with Pavithra Lutz ANDREAS stating her phone was dead after being in the ED all night, she rescheduled to 12/9 with Cornelia.

## 2020-12-08 ENCOUNTER — THERAPY VISIT (OUTPATIENT)
Dept: OCCUPATIONAL THERAPY | Facility: CLINIC | Age: 20
End: 2020-12-08
Payer: COMMERCIAL

## 2020-12-08 VITALS
DIASTOLIC BLOOD PRESSURE: 77 MMHG | TEMPERATURE: 98.9 F | HEART RATE: 82 BPM | OXYGEN SATURATION: 91 % | RESPIRATION RATE: 16 BRPM | SYSTOLIC BLOOD PRESSURE: 132 MMHG

## 2020-12-08 DIAGNOSIS — M25.621 STIFFNESS OF ELBOW JOINT, RIGHT: ICD-10-CM

## 2020-12-08 PROCEDURE — 97110 THERAPEUTIC EXERCISES: CPT | Mod: GO | Performed by: OCCUPATIONAL THERAPIST

## 2020-12-08 PROCEDURE — 36591 DRAW BLOOD OFF VENOUS DEVICE: CPT

## 2020-12-08 PROCEDURE — 97763 ORTHC/PROSTC MGMT SBSQ ENC: CPT | Mod: GO | Performed by: OCCUPATIONAL THERAPIST

## 2020-12-08 PROCEDURE — 250N000011 HC RX IP 250 OP 636: Performed by: PEDIATRICS

## 2020-12-08 RX ORDER — HEPARIN SODIUM (PORCINE) LOCK FLUSH IV SOLN 100 UNIT/ML 100 UNIT/ML
5 SOLUTION INTRAVENOUS
Status: COMPLETED | OUTPATIENT
Start: 2020-12-08 | End: 2020-12-08

## 2020-12-08 RX ADMIN — Medication 5 ML: at 14:04

## 2020-12-08 ASSESSMENT — PAIN SCALES - GENERAL: PAINLEVEL: EXTREME PAIN (8)

## 2020-12-08 NOTE — DISCHARGE INSTRUCTIONS
Please make an appointment to follow up with Hematology Oncology Clinic (phone: [phone]) in 2-4 days even if entirely better.    Your blood work is stable.     Please return to the ER if any other problems/concerns.

## 2020-12-08 NOTE — NURSING NOTE
"Chief Complaint   Patient presents with     Port Draw     Port accessed with 20 gauge 3/4\" gripper needle and labs drawn by rn.  Port flushed with NS and heparin then de-accessed.  Pt tolerated well.  VS taken.      Gay Rice, RN      "

## 2020-12-08 NOTE — PROGRESS NOTES
"Hand Therapy Progress Note    Current Date:  12/8/2020    Diagnosis: Right Elbow Flexor Lengthening, Flexor Pronator Slide Of Wrist and Finger, Thumb Adductor Lengthening    DOS: 10/02/19  Procedure:  Right Elbow Flexor Lengthening, Flexor Pronator Slide Of Wrist and Finger, Thumb Adductor Lengthening     Precautions: NA    Note from MD on 10/1/20 \"I would like to have her see our therapist for wrist hand orthosis.  The primary recurrence has been at her wrist.  Her elbow has remained stable.\"     Return to [facility] April 1, 2021    Reporting period is 8/20/20 to 12/8/2020    Subjective:   Subjective changes noted by patient:  \"My elbow has been doing well, wrist not as much\"  Functional changes noted by patient:  No Change to Self Care Tasks (dressing, eating, bathing, hygiene/toileting)  Patient has noted adverse reaction to:  None    Objective:  ROM  Pain Report: - none  + mild    ++ moderate    +++ severe   Elbow 10/24/2019 8/20/20 12/8/20   AROM (PROM) R     Extension -46 -46 (-20) -36   Flexion 95  136   Supination + 0 0*   Pronation 75 80 80   *do see movement at elbow  ROM  Pain Report: - none  + mild    ++ moderate    +++ severe   Wrist 10/24/2019 8/20/20 12/8/20   AROM (PROM) right     Extension (-19) -90 (-53) -103 (-47)     Strength  contraindicated    Please refer to the daily flowsheet for treatment provided today.     Assessment:  Response to therapy has been improvement to:  ROM of Elbow:  AROM  Wrist:  PROM    Overall Assessment:  Patient would benefit from continued therapy to achieve rehab potential  STG/LTG:  STGoals have been reviewed and progress or achievement has occurred;  see goal sheet for details and updates.    Plan:  Frequency/Duration:  Recommend continuing to see patient  2 X a month, once daily  for 2 months  Appropriateness of Rx I have re-evaluated this patient and find that the nature, scope, duration and intensity of the therapy is appropriate for the medical condition of the " patient.  Recommendations for Continued Therapy  Continue with current POC.    Home Exercise Program:  Wrist orthosis at neutral during day  Resting hand at night  Long arm-emphasizing extension at night  Passive range of motion for wrist extension, while maintaining finger functional position    Next Visit:  Orthosis modifications  AROM  Scar mgmt

## 2020-12-08 NOTE — ED TRIAGE NOTES
Pt. Presents to ED from home with complaints of worsening bilateral arm pain, side pain and neck pain since this morning with no relief from her home meds. AVSS on RA. A & O x 4, independent.

## 2020-12-08 NOTE — ED PROVIDER NOTES
"    [place] EMERGENCY DEPARTMENT ([facility])  12/07/20  History     Chief Complaint   Patient presents with     Sickle Cell Pain Crisis     The history is provided by the patient and medical records.     Jennifer Cervantes is a 20 year old female with a past medical history significant for uncomplicated asthma, right-sided cerebral infarction, and sickle cell disease who presents to the Emergency Department for evaluation of a suspected sickle cell pain crisis.  Patient endorses generalized left-sided pain that radiates up the left side of the neck, and down the left arm that started this morning.  She states that she has tried her usual oxycodone, OxyContin, Tylenol, ibuprofen, and warm baths with no improvement of her pain.  Patient states that this is a \"pressure\" pain that feels like someone is \"pushing down hard\" to the affected areas.  She denies any fevers, cough, or other URI symptoms.  No known COVID-19 contacts.  Denies dysuria, hematuria, or other urinary symptoms.  Denies chance of pregnancy.    I have reviewed the Medications, Allergies, Past Medical and Surgical History, and Social History in the [employer] system.  PAST MEDICAL HISTORY:   Past Medical History:   Diagnosis Date     Anemia      Anxiety      Bleeding disorder (H)      Blood clotting disorder (H)      Cerebral infarction (H) 2015     Cognitive developmental delay     low IQ. 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 (H)     right hand contractures     Iron overload due to repeated red blood cell transfusions      Migraines      Oppositional defiant behavior      Uncomplicated asthma        PAST SURGICAL HISTORY:   Past Surgical History:   Procedure Laterality Date     AS INSERT TUNNELED CV 2 CATH W/O PORT/PUMP       C BREAST REDUCTION (INCLUDES LIPO) TIER 3 Bilateral 04/23/2019     IR CVC NON TUNNEL PLACEMENT  " 4/7/2020     REPAIR TENDON ELBOW Right 10/2/2019    Procedure: Right Elbow Flexor Lengthening, Flexor Pronator Slide Of Wrist and Finger, Thumb Adductor Lengthening;  Surgeon: Anai Franco MD;  Location: [facility] OR     TONSILLECTOMY Bilateral 10/2/2019    Procedure: Bilateral Tonsillectomy;  Surgeon: Farhana Guy MD;  Location: [facility] OR       Past medical history, past surgical history, medications, and allergies were reviewed with the patient. Additional pertinent items: None    FAMILY HISTORY:   Family History   Problem Relation Age of Onset     Sickle Cell Trait Mother      Sickle Cell Trait Father        SOCIAL HISTORY:   Social History     Tobacco Use     Smoking status: Never Smoker     Smokeless tobacco: Never Used   Substance Use Topics     Alcohol use: Not Currently     Alcohol/week: 0.0 standard drinks     Social history was reviewed with the patient. Additional pertinent items: None      Patient's Medications   New Prescriptions    No medications on file   Previous Medications    ACETAMINOPHEN (TYLENOL) 325 MG TABLET    Take 2 tablets (650 mg) by mouth every 6 hours as needed for mild pain    ALBUTEROL (PROVENTIL) (2.5 MG/3ML) 0.083% NEB SOLUTION    Take 1 vial (2.5 mg) by nebulization every 6 hours as needed for shortness of breath / dyspnea or wheezing    ASPIRIN (ASPIRIN) 81 MG EC TABLET    Take 1 tablet (81 mg) by mouth daily    BUDESONIDE-FORMOTEROL FUMARATE (SYMBICORT IN)    Inhale  into the lungs.    CELECOXIB (CELEBREX) 100 MG CAPSULE    Take 1 capsule (100 mg) by mouth 2 times daily    DIPHENHYDRAMINE (BENADRYL) 25 MG CAPSULE    Take 1-2 capsules (25-50 mg) by mouth nightly as needed for sleep    HYDROXYUREA 1000 MG TABS    Take 2,000 mg by mouth daily    IBUPROFEN (ADVIL/MOTRIN) 200 MG TABLET    Take 3 tablets (600 mg) by mouth every 6 hours as needed for moderate pain    JADENU 360 MG TABLET    Take 4 tablets (1,440 mg) by mouth daily    MEDROXYPROGESTERONE (DEPO-PROVERA) 150  "MG/ML IM INJECTION    Inject 150 mg into the muscle    NALOXONE (NARCAN) 4 MG/0.1ML NASAL SPRAY    Spray 1 spray (4 mg) into one nostril alternating nostrils once as needed for opioid reversal every 2-3 minutes until assistance arrives    ONDANSETRON (ZOFRAN) 8 MG TABLET        OXYCODONE IR (ROXICODONE) 10 MG TABLET    Take 1 tablet (10mg) by mouth every 6 hours for pain. If, after 2 doses it is not working, try a dose at 4 hours and call clinic.    OXYCONTIN 10 MG 12 HR TABLET    Take 1 tablet (10 mg) by mouth every 12 hours    SERTRALINE (ZOLOFT) 100 MG TABLET    Take 2 tablets (200 mg) by mouth daily    VOXELOTOR 500 MG TABS    Take 1,500 mg by mouth daily   Modified Medications    No medications on file   Discontinued Medications    No medications on file          Allergies   Allergen Reactions     Fish-Derived Products Hives     Seafood Hives     Contrast Dye      Diagnostic X-Ray Materials      Gadolinium         Review of Systems   Constitutional: Negative for fever.   HENT: Negative for congestion.    Eyes: Negative for redness.   Respiratory: Negative for shortness of breath.    Cardiovascular: Negative for chest pain.   Gastrointestinal: Negative for abdominal pain.   Genitourinary: Negative for difficulty urinating.   Musculoskeletal: Positive for neck pain (Left-sided). Negative for arthralgias and neck stiffness.        Generalized left-sided pain, left arm pain     Skin: Negative for color change.   Neurological: Negative for headaches.   Psychiatric/Behavioral: Negative for confusion.   All other systems reviewed and are negative.        Physical Exam   BP: 128/71  Pulse: 128  Temp: 98.5  F (36.9  C)  Resp: 16  Height: 162.6 cm (5' 4\")  Weight: 70.3 kg (155 lb)  SpO2: 92 %      Physical Exam  Vitals signs and nursing note reviewed.   Constitutional:       Appearance: She is well-developed.   HENT:      Head: Normocephalic and atraumatic.      Mouth/Throat:      Mouth: Mucous membranes are moist. "   Neck:      Musculoskeletal: Normal range of motion and neck supple.      Comments: Mild tenderness left lateral neck.  No midline tenderness.  Cardiovascular:      Rate and Rhythm: Normal rate and regular rhythm.      Heart sounds: Normal heart sounds.   Pulmonary:      Effort: Pulmonary effort is normal. No respiratory distress.      Breath sounds: Normal breath sounds.   Abdominal:      General: There is no distension.      Palpations: Abdomen is soft.      Tenderness: There is no abdominal tenderness. There is no rebound.   Musculoskeletal:         General: No tenderness.      Comments: Patient with pain in the left lower back and in the left lateral neck.   Skin:     General: Skin is warm and dry.   Neurological:      Mental Status: She is alert and oriented to person, place, and time.      Comments: Right hemiplegia and right arm.  The arm with a contracture.   Psychiatric:         Behavior: Behavior normal.         Thought Content: Thought content normal.         ED Course   8:50 PM  The patient was seen and examined by Mamie Castro MD in Room ED11.        Procedures                       Results for orders placed or performed during the hospital encounter of 12/07/20   XR Chest Port 1 View     Status: None    Narrative     Examination:  XR CHEST PORT 1 VW     Date:  12/7/2020 9:35 PM      Clinical Information: sickle cell pain crisis     Additional Information: none    Comparison: 12/3/2020 chest x-ray.    Findings:     The lungs are well-expanded and clear. The heart size is normal. There  is a left-sided PICC line with the tip projected in the lower SVC. The  osseous structures appear to be grossly intact. There are no pleural  effusions.      Impression    Impression:    1. Normal chest x-ray..    RAJ VIDES MD   CBC with platelets differential     Status: Abnormal   Result Value Ref Range    WBC 11.8 (H) 4.0 - 11.0 10e9/L    RBC Count 2.17 (L) 3.8 - 5.2 10e12/L    Hemoglobin 7.1 (L) 11.7 - 15.7  g/dL    Hematocrit 20.5 (L) 35.0 - 47.0 %    MCV 95 78 - 100 fl    MCH 32.7 26.5 - 33.0 pg    MCHC 34.6 31.5 - 36.5 g/dL    RDW 28.4 (H) 10.0 - 15.0 %    Platelet Count 379 150 - 450 10e9/L    Diff Method Manual Differential     % Neutrophils 66.3 %    % Lymphocytes 20.9 %    % Monocytes 5.5 %    % Eosinophils 6.4 %    % Basophils 0.9 %    Nucleated RBCs 34 (H) 0 /100    Absolute Neutrophil 7.8 1.6 - 8.3 10e9/L    Absolute Lymphocytes 2.5 0.8 - 5.3 10e9/L    Absolute Monocytes 0.6 0.0 - 1.3 10e9/L    Absolute Eosinophils 0.8 (H) 0.0 - 0.7 10e9/L    Absolute Basophils 0.1 0.0 - 0.2 10e9/L    Absolute Nucleated RBC 4.0     Anisocytosis Marked     Poikilocytosis Marked     Polychromasia Marked     Sickle Cells Marked     Target Cells Moderate     Macrocytes Present     Platelet Estimate Confirming automated cell count    Comprehensive metabolic panel     Status: Abnormal   Result Value Ref Range    Sodium 140 133 - 144 mmol/L    Potassium 3.5 3.4 - 5.3 mmol/L    Chloride 111 (H) 94 - 109 mmol/L    Carbon Dioxide 23 20 - 32 mmol/L    Anion Gap 6 3 - 14 mmol/L    Glucose 99 70 - 99 mg/dL    Urea Nitrogen 6 (L) 7 - 30 mg/dL    Creatinine 0.60 0.52 - 1.04 mg/dL    GFR Estimate >90 >60 mL/min/[1.73_m2]    GFR Estimate If Black >90 >60 mL/min/[1.73_m2]    Calcium 8.7 8.5 - 10.1 mg/dL    Bilirubin Total 3.0 (H) 0.2 - 1.3 mg/dL    Albumin 3.8 3.4 - 5.0 g/dL    Protein Total 7.6 6.8 - 8.8 g/dL    Alkaline Phosphatase 71 40 - 150 U/L    ALT 81 (H) 0 - 50 U/L     (H) 0 - 45 U/L   Reticulocyte count     Status: Abnormal   Result Value Ref Range    % Retic 23.4 (H) 0.5 - 2.0 %    Absolute Retic 506.9 (H) 25 - 95 10e9/L     Medications   0.9% sodium chloride BOLUS (0 mLs Intravenous Stopped 12/7/20 2109)     Followed by   sodium chloride 0.9% infusion (has no administration in time range)   morphine (PF) injection 2 mg (2 mg Intravenous Given 12/7/20 2212)   lidocaine 1 % 0.1-1 mL (has no administration in time range)    lidocaine (LMX4) cream (has no administration in time range)   sodium chloride (PF) 0.9% PF flush 10-20 mL (has no administration in time range)   heparin lock flush 10 UNIT/ML injection 5-10 mL (has no administration in time range)   heparin lock flush 10 UNIT/ML injection 5-10 mL (has no administration in time range)   heparin 100 UNIT/ML injection 5 mL (5 mLs Intracatheter Given 12/7/20 2256)   sodium chloride (PF) 0.9% PF flush 10-20 mL (has no administration in time range)   ondansetron (ZOFRAN) injection 8 mg (8 mg Intravenous Given 12/7/20 1959)            No results found for this or any previous visit (from the past 24 hour(s)).  Medications   0.9% sodium chloride BOLUS (1,000 mLs Intravenous New Bag 12/7/20 1959)     Followed by   sodium chloride 0.9% infusion (has no administration in time range)   morphine (PF) injection 2 mg (2 mg Intravenous Given 12/7/20 1959)   ondansetron (ZOFRAN) injection 8 mg (8 mg Intravenous Given 12/7/20 1959)             Assessments & Plan (with Medical Decision Making)   Patient is a 20-year-old female with a history of prior stroke with some hemiplegia in the right arm, with additional history of sickle cell disease who presents to the ER complaining of a pain crisis.  Patient denies that her pain is typical for her sickle cell pain.  Patient here has a negative chest x-ray.  Her Retic count is elevated at 506.  Patient's bilirubin is at 3 which is similar to prior.  Patient's hemoglobin is stable at 7.  Patient's pain plan was followed and she was given 3 doses of IV morphine.  She is feeling better after this.  Patient will be discharged home with outpatient hematology follow-up.  This part of the medical record was transcribed by Jacob Pastor Scribe, from a dictation done by Mamie Castro MD.     I have reviewed the nursing notes.    I have reviewed the findings, diagnosis, plan and need for follow up with the patient.    New Prescriptions    No  medications on file       Final diagnoses:   Sickle cell pain crisis (H)   IEvangelist, am serving as a trained medical scribe to document services personally performed by Mamie Castro MD, based on the provider's statements to me.      Mamie NEWMAN MD, was physically present and have reviewed and verified the accuracy of this note documented by Evangelist Bradshaw.     12/7/2020   [facility] EMERGENCY DEPARTMENT     Mamie Castro MD  12/07/20 [id number]

## 2020-12-09 ENCOUNTER — VIRTUAL VISIT (OUTPATIENT)
Dept: ONCOLOGY | Facility: CLINIC | Age: 20
End: 2020-12-09
Attending: PEDIATRICS
Payer: COMMERCIAL

## 2020-12-09 DIAGNOSIS — D57.1 HB-SS DISEASE WITHOUT CRISIS (H): Primary | ICD-10-CM

## 2020-12-09 DIAGNOSIS — D57.00 SICKLE CELL CRISIS (H): ICD-10-CM

## 2020-12-09 PROCEDURE — 99214 OFFICE O/P EST MOD 30 MIN: CPT | Mod: GT | Performed by: PHYSICIAN ASSISTANT

## 2020-12-09 PROCEDURE — 999N001193 HC VIDEO/TELEPHONE VISIT; NO CHARGE

## 2020-12-09 NOTE — PROGRESS NOTES
Sickle Cell Outpatient Follow Up Visit      Date of encounter: Dec 9, 2020    Jennifer Cervantes is a 20 year old female who is being evaluated via a billable video visit.      Interval History:   -ED yesterday. Presented with left sided pain that radiates up neck and down her arm. CXR was negative. Pain improved with IV morphine  -Still in pain (arms, legs and back, which is typical for her), though managing better. Trying to hold off going to ED/infusion center if not needed  -using IBU and APAP (every 6-8 hours), oxy, heat. Using oxy IR every 6 hours, oxycontin every 12 hours. She is unsure if the oxycontin is working for her anymore and wants to stop  -more difficulties with pain due to cold. There is a couple days she doesn't need as much pain meds, though most days pain if pretty bad  -started oxybryta which she started on 12/3. Starting to have some CP. No difficulties with SOB or cough. Had CP evaluated in ED. Chest is always hurting now (rate 8.5/10). CP is worse at night. Can get sharp pain with deep breathing.   -Denies N/V or stools changes  -Appetite is good. Drinking lots of fluids    -Denies bleeding or bruising  -trying to the Jadenu more consistently     History of Present Illness:  (Initial visit remarks from Dr. 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 She also has significant anxiety and depression with a strong anxiety about transferring to the adult clinic. She usually has pain crises secondary to the anxiety.      --------------------------------  Plan last reviewed with patient: 11/6/2020    Patient background: 19yo F, enjoys movies and kids though there are times where she does not really want to talk to people.  Does not have a lot of social support at home.     Sickle Cell Disease History  Primary Hematologist: Perla  PCP: Raul  Genotype: SS  Acute Pain Crisis Treatment:    ER/Acute Care/Infusion Clinic:   o Morphine 2 mg IVP/SC Q1H X 3 doses  o Toradol x1  o Maintenance IV fluids with LR  o Other: Benadryl PO and Zofran 8 mg IV PRN itching or nausea    Inpatient:  o Opioid: Morphine 2 mg IV Q1H PRN until PCA starts  o PCA plan:   - PCA button dose: Morphine 1 mg  - Lockout: 20 minutes  - Continuous Infusion: consider 1 mg/hr  - One hr max: 4 mg  o Other Medications: Benadryl, Zofran  o If PCA not working, she Has had success with ketamine starting at 4mg/h and advancing only to 6mg/h, as 8mg/h made her feel quite poorly.  o Continue ASA (ok to give celebrex)--discuss Toradol with hematology first  o Supportive Care: Docusate, Senna  Chronic Pain Medications:    Home regimen  OxyCONTIN 10 mg po q 12 hrs and oxycodone 10 mg p.o. q.6 hours p.r.n. breakthrough pain.  She also continues on Celebrex, and Zoloft among other medications.    -Also benefits from mental health visits, acupuncture  Baseline Hemoglobin: 9.5 g/dL (on chronic transfusions), 7-8 without  Hydroxyurea use: Yes  H/O blood transfusions: Yes, several (iron overload) Most recent 5/22/20    H/O Transfusion Reactions: no    Antibodies:none  H/O Acute Chest Syndrome: Yes    Last episode: 10/2019     ICU/intubation: unsure  H/O Stroke: Yes (managed with chronic transfusions in the past)  H/O VTE: no  H/O Cholecystectomy or Splenectomy: no  H/O Asthma, Pulm HTN, AVN, Leg Ulcers, Nephropathy, Retinopathy, etc: Iron overload, asthma, chronic lung disease, developmental delay from early stroke    -------------------------------------------    Sickle Cell Disease Comprehensive Checklist    Bone Health/Avascular Necrosis Screening/Symptoms (each visit): no new concerns today    Leg Ulcer evaluation (every visit): none    Hypertension (every visit): mildly  hypertensive today, unclear whether it is anxiety about being in clinic or more than that    Last ophthalmologic exam: 7/29/20    Last urinalysis for microalbuminuria/CKD (annually): within last year    Last pulmonary evaluation (asthma, AMAN, pulm HTN): within last year    Stroke/silent cerebral infarct Hx (Y/N): Yes TIA ~2014, first event ~age 1 with full stroke and R sided weakness    Last PCP Visit: 8/2020    Vaccines:  o PCV13: 5/13/19  o Pneumovax (PPSV23): 3/04, 10/09, 7/12/19 (next due 7/2024)  o Menactra: 4/2010, 9/2015 (MCV due Sept 2020)  o Influenza: got 19-20 vaccine    Audiology (chelation): done 6/2020, normal    Past Medical History:  Past Medical History:   Diagnosis Date     Anemia      Anxiety      Bleeding disorder (H)      Blood clotting disorder (H)      Cerebral infarction (H) 2015     Cognitive developmental delay     low IQ. 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 (H)     right hand contractures     Iron overload due to repeated red blood cell transfusions      Migraines      Oppositional defiant behavior      Uncomplicated asthma        Past Surgical History:  Past Surgical History:   Procedure Laterality Date     AS INSERT TUNNELED CV 2 CATH W/O PORT/PUMP       C BREAST REDUCTION (INCLUDES LIPO) TIER 3 Bilateral 04/23/2019     IR CVC NON TUNNEL PLACEMENT  4/7/2020     REPAIR TENDON ELBOW Right 10/2/2019    Procedure: Right Elbow Flexor Lengthening, Flexor Pronator Slide Of Wrist and Finger, Thumb Adductor Lengthening;  Surgeon: Anai Franco MD;  Location: [facility] OR     TONSILLECTOMY Bilateral 10/2/2019    Procedure: Bilateral Tonsillectomy;  Surgeon: Farhana Guy MD;  Location: [facility] OR       Family History:   Family History   Problem Relation Age of Onset     Sickle Cell Trait Mother      Sickle Cell Trait Father        Social  "History:  Social History     Socioeconomic History     Marital status: Single     Spouse name: Not on file     Number of children: Not on file     Years of education: Not on file     Highest education level: Not on file   Occupational History     Not on file   Social Needs     Financial resource strain: Not on file     Food insecurity     Worry: Not on file     Inability: Not on file     Transportation needs     Medical: Not on file     Non-medical: Not on file   Tobacco Use     Smoking status: Never Smoker     Smokeless tobacco: Never Used   Substance and Sexual Activity     Alcohol use: Not Currently     Alcohol/week: 0.0 standard drinks     Drug use: Never     Sexual activity: Not Currently     Partners: Male     Birth control/protection: Other   Lifestyle     Physical activity     Days per week: Not on file     Minutes per session: Not on file     Stress: Not on file   Relationships     Social connections     Talks on phone: Not on file     Gets together: Not on file     Attends Sabianist service: Not on file     Active member of club or organization: Not on file     Attends meetings of clubs or organizations: Not on file     Relationship status: Not on file     Intimate partner violence     Fear of current or ex partner: Not on file     Emotionally abused: Not on file     Physically abused: Not on file     Forced sexual activity: Not on file   Other Topics Concern     Parent/sibling w/ CABG, MI or angioplasty before 65F 55M? Not Asked   Social History Narrative    Lives with mom and extended family but \"toxic environment\" per her report. She would like to move out but cannot financially do so. She has minimal support at home despite her significant SCD comorbidities and cognitive delay from stroke.       Medications:  Current Outpatient Medications   Medication     acetaminophen (TYLENOL) 325 MG tablet     albuterol (PROVENTIL) (2.5 MG/3ML) 0.083% neb solution     aspirin (ASPIRIN) 81 MG EC tablet     " Budesonide-Formoterol Fumarate (SYMBICORT IN)     celecoxib (CELEBREX) 100 MG capsule     diphenhydrAMINE (BENADRYL) 25 MG capsule     Hydroxyurea 1000 MG TABS     ibuprofen (ADVIL/MOTRIN) 200 MG tablet     JADENU 360 MG tablet     medroxyPROGESTERone (DEPO-PROVERA) 150 MG/ML IM injection     naloxone (NARCAN) 4 MG/0.1ML nasal spray     ondansetron (ZOFRAN) 8 MG tablet     oxyCODONE IR (ROXICODONE) 10 MG tablet     OXYCONTIN 10 MG 12 hr tablet     sertraline (ZOLOFT) 100 MG tablet     Voxelotor 500 MG TABS     No current facility-administered medications for this visit.          Physical Exam:   There were no vitals taken for this visit. (virtual video)    GEN: young  female, energetic and a bit animated but not overly worked up  HEENT: full head of hair, no icterus, MMM  NECK: no visible asymmetry  RESP: speaking in full sentences, no increased work of breathing  MSK: brace on right wrist and right ankle (AFO)  NEURO: alert and oriented, stable contracture of right wrist.   SKIN: port site c/d/i (visible on video).     The rest of a comprehensive physical examination is deferred due to PHE (public health emergency) video restrictions      Labs:      12/7/2020 19:47   Sodium 140   Potassium 3.5   Chloride 111 (H)   Carbon Dioxide 23   Urea Nitrogen 6 (L)   Creatinine 0.60   GFR Estimate >90   GFR Estimate If Black >90   Calcium 8.7   Anion Gap 6   Albumin 3.8   Protein Total 7.6   Bilirubin Total 3.0 (H)   Alkaline Phosphatase 71   ALT 81 (H)    (H)   Glucose 99   WBC 11.8 (H)   Hemoglobin 7.1 (L)   Hematocrit 20.5 (L)   Platelet Count 379   RBC Count 2.17 (L)   MCV 95   MCH 32.7   MCHC 34.6   RDW 28.4 (H)   Diff Method Manual Differential   % Neutrophils 66.3   % Lymphocytes 20.9   % Monocytes 5.5   % Eosinophils 6.4   % Basophils 0.9   Nucleated RBCs 34 (H)   Absolute Neutrophil 7.8   Absolute Lymphocytes 2.5   Absolute Monocytes 0.6   Absolute Eosinophils 0.8 (H)   Absolute Basophils 0.1    Absolute Nucleated RBC 4.0   Anisocytosis Marked   Poikilocytosis Marked   Polychromasia Marked   Sickle Cells Marked   Target Cells Moderate   Macrocytes Present   Platelet Estimate Confirming automated cell count   % Retic 23.4 (H)   Absolute Retic 506.9 (H)       Imaging: none today    Assessment:  Jennifer Cervantes is a 20 year old female with HbSS. Her disease has been complicated by stroke leading to significant cognitive delay and right sided hemiparesis, high anxiety leading to frequent pain crises on top of chronic pain syndrome, poor social structure at home with no real support, and iron overload secondary to repeated transfusions.     Major Topics of the Visit:    # Pain Management: Had a long talk about pain management today. She had a lot of questions about home management as well as use of infusions and ED. Discussed that she should do her best to manage pain at home , though we understand that she will have times in which the pain is not well controlled and she is encouraged to call for IV infusions or go to the ED.  She does not need our permission to go to the ED if she feels like her pain is uncontrolled.  Her current complaint is that she feels like the OxyContin is no longer effective for her and she would like to stop it.  We had a long discussion about likely stopping this will make her pain worse and that is helping her more than she knows that is , though she still wants to try holding the medication.  Since she is on 10 mg twice daily I do not want her just to stop.  I will have her decrease to once a day dosing for a week and then stop.  If her pain were to increase as expected, she should restart the OxyContin.  We will NOT refill opioids in fewer than 2 weeks and prefer 3-4 weeks.  Since she is wanting to change her pain medications, I feel like this is likely going to be a good time to refer her to pain management though I will talk with Dr. Duncan about his preferences.     Addendum:  Discussed with Dr. Duncan. Do not want to send her to pain clinic at this point. Ok to try and decrease dose of Oxycontin to 10 mg daily. Want her to start next week to be able to triage through the week.     #Iron Overload/Pharmacy Issues: It sounds like a combination of insurance restrictions and specialty pharmacy protocols (ie. Confirming a phone call before sending Rx) are combining to cause real problems for Jennifer. We have tried to help with obtaining better access for her. Discussed the importance of being compliant with the Jadenu; she states she is currently taking as prescribed. We did try Oxybryta, though stopping as below. Will reach out to Dr. Duncan to see if he wanted to do in-clinic IV chelation (though we know this is less-than-ideal during the pandemic; she has done this previously though IP)    Addendum: Will plan to rediscuss IV chelation after the holidays, after she has been taking the Jadenu more regularly. Will recheck ferritin at this point     #High RBC turnover: She has a really high degree of RBC turnover, more than most patients sickle patients. Was started on Oxbryta though started having CP and no signs of hgb improvement yet. Will have her stop. Continue monitoring     # Follow up: Will have her follow-up with Andrei Machado PAC in 2 weeks for close followup and reestablishment of care (she needs more continuity of care and Andrei will be returning from maternity leave next week)    Cornelia Suresh PA-C  [facility]  [address] [place] [address] [phone]

## 2020-12-09 NOTE — LETTER
12/9/2020         RE: Jennifer Cervantes  [address]  [facility] [address]        Dear Colleague,    Thank you for referring your patient, Jennifer Cervantes, to the [facility]. Please see a copy of my visit note below.    Sickle Cell Outpatient Follow Up Visit      Date of encounter: Dec 9, 2020    Jennifer Cervantes is a 20 year old female who is being evaluated via a billable video visit.      Interval History:   -ED yesterday. Presented with left sided pain that radiates up neck and down her arm. CXR was negative. Pain improved with IV morphine  -Still in pain (arms, legs and back, which is typical for her), though managing better. Trying to hold off going to ED/infusion center if not needed  -using IBU and APAP (every 6-8 hours), oxy, heat. Using oxy IR every 6 hours, oxycontin every 12 hours. She is unsure if the oxycontin is working for her anymore and wants to stop  -more difficulties with pain due to cold. There is a couple days she doesn't need as much pain meds, though most days pain if pretty bad  -started oxybryta which she started on 12/3. Starting to have some CP. No difficulties with SOB or cough. Had CP evaluated in ED. Chest is always hurting now (rate 8.5/10). CP is worse at night. Can get sharp pain with deep breathing.   -Denies N/V or stools changes  -Appetite is good. Drinking lots of fluids    -Denies bleeding or bruising  -trying to the Jadenu more consistently     History of Present Illness:  (Initial visit remarks from Dr. 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 She also has significant anxiety and depression with a strong anxiety about transferring to the adult clinic. She usually  has pain crises secondary to the anxiety.      --------------------------------  Plan last reviewed with patient: 11/6/2020    Patient background: 19yo F, enjoys movies and kids though there are times where she does not really want to talk to people. Does not have a lot of social support at home.     Sickle Cell Disease History  Primary Hematologist: Perla  PCP: Raul  Genotype: SS  Acute Pain Crisis Treatment:    ER/Acute Care/Infusion Clinic:   o Morphine 2 mg IVP/SC Q1H X 3 doses  o Toradol x1  o Maintenance IV fluids with LR  o Other: Benadryl PO and Zofran 8 mg IV PRN itching or nausea    Inpatient:  o Opioid: Morphine 2 mg IV Q1H PRN until PCA starts  o PCA plan:   - PCA button dose: Morphine 1 mg  - Lockout: 20 minutes  - Continuous Infusion: consider 1 mg/hr  - One hr max: 4 mg  o Other Medications: Benadryl, Zofran  o If PCA not working, she Has had success with ketamine starting at 4mg/h and advancing only to 6mg/h, as 8mg/h made her feel quite poorly.  o Continue ASA (ok to give celebrex)--discuss Toradol with hematology first  o Supportive Care: Docusate, Senna  Chronic Pain Medications:    Home regimen  OxyCONTIN 10 mg po q 12 hrs and oxycodone 10 mg p.o. q.6 hours p.r.n. breakthrough pain.  She also continues on Celebrex, and Zoloft among other medications.    -Also benefits from mental health visits, acupuncture  Baseline Hemoglobin: 9.5 g/dL (on chronic transfusions), 7-8 without  Hydroxyurea use: Yes  H/O blood transfusions: Yes, several (iron overload) Most recent 5/22/20    H/O Transfusion Reactions: no    Antibodies:none  H/O Acute Chest Syndrome: Yes    Last episode: 10/2019     ICU/intubation: unsure  H/O Stroke: Yes (managed with chronic transfusions in the past)  H/O VTE: no  H/O Cholecystectomy or Splenectomy: no  H/O Asthma, Pulm HTN, AVN, Leg Ulcers, Nephropathy, Retinopathy, etc: Iron overload, asthma, chronic lung disease, developmental delay from early  stroke    -------------------------------------------    Sickle Cell Disease Comprehensive Checklist    Bone Health/Avascular Necrosis Screening/Symptoms (each visit): no new concerns today    Leg Ulcer evaluation (every visit): none    Hypertension (every visit): mildly hypertensive today, unclear whether it is anxiety about being in clinic or more than that    Last ophthalmologic exam: 7/29/20    Last urinalysis for microalbuminuria/CKD (annually): within last year    Last pulmonary evaluation (asthma, AMAN, pulm HTN): within last year    Stroke/silent cerebral infarct Hx (Y/N): Yes TIA ~2014, first event ~age 1 with full stroke and R sided weakness    Last PCP Visit: 8/2020    Vaccines:  o PCV13: 5/13/19  o Pneumovax (PPSV23): 3/04, 10/09, 7/12/19 (next due 7/2024)  o Menactra: 4/2010, 9/2015 (MCV due Sept 2020)  o Influenza: got 19-20 vaccine    Audiology (chelation): done 6/2020, normal    Past Medical History:  Past Medical History:   Diagnosis Date     Anemia      Anxiety      Bleeding disorder (H)      Blood clotting disorder (H)      Cerebral infarction (H) 2015     Cognitive developmental delay     low IQ. 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 (H)     right hand contractures     Iron overload due to repeated red blood cell transfusions      Migraines      Oppositional defiant behavior      Uncomplicated asthma        Past Surgical History:  Past Surgical History:   Procedure Laterality Date     AS INSERT TUNNELED CV 2 CATH W/O PORT/PUMP       C BREAST REDUCTION (INCLUDES LIPO) TIER 3 Bilateral 04/23/2019     IR CVC NON TUNNEL PLACEMENT  4/7/2020     REPAIR TENDON ELBOW Right 10/2/2019    Procedure: Right Elbow Flexor Lengthening, Flexor Pronator Slide Of Wrist and Finger, Thumb Adductor Lengthening;  Surgeon: Anai Franco MD;  Location: [facility] OR     TONSILLECTOMY  "Bilateral 10/2/2019    Procedure: Bilateral Tonsillectomy;  Surgeon: Farhana Guy MD;  Location: [facility] OR       Family History:   Family History   Problem Relation Age of Onset     Sickle Cell Trait Mother      Sickle Cell Trait Father        Social History:  Social History     Socioeconomic History     Marital status: Single     Spouse name: Not on file     Number of children: Not on file     Years of education: Not on file     Highest education level: Not on file   Occupational History     Not on file   Social Needs     Financial resource strain: Not on file     Food insecurity     Worry: Not on file     Inability: Not on file     Transportation needs     Medical: Not on file     Non-medical: Not on file   Tobacco Use     Smoking status: Never Smoker     Smokeless tobacco: Never Used   Substance and Sexual Activity     Alcohol use: Not Currently     Alcohol/week: 0.0 standard drinks     Drug use: Never     Sexual activity: Not Currently     Partners: Male     Birth control/protection: Other   Lifestyle     Physical activity     Days per week: Not on file     Minutes per session: Not on file     Stress: Not on file   Relationships     Social connections     Talks on phone: Not on file     Gets together: Not on file     Attends [employer]ism service: Not on file     Active member of club or organization: Not on file     Attends meetings of clubs or organizations: Not on file     Relationship status: Not on file     Intimate partner violence     Fear of current or ex partner: Not on file     Emotionally abused: Not on file     Physically abused: Not on file     Forced sexual activity: Not on file   Other Topics Concern     Parent/sibling w/ CABG, MI or angioplasty before 65F 55M? Not Asked   Social History Narrative    Lives with mom and extended family but \"toxic environment\" per her report. She would like to move out but cannot financially do so. She has minimal support at home despite her significant SCD " comorbidities and cognitive delay from stroke.       Medications:  Current Outpatient Medications   Medication     acetaminophen (TYLENOL) 325 MG tablet     albuterol (PROVENTIL) (2.5 MG/3ML) 0.083% neb solution     aspirin (ASPIRIN) 81 MG EC tablet     Budesonide-Formoterol Fumarate (SYMBICORT IN)     celecoxib (CELEBREX) 100 MG capsule     diphenhydrAMINE (BENADRYL) 25 MG capsule     Hydroxyurea 1000 MG TABS     ibuprofen (ADVIL/MOTRIN) 200 MG tablet     JADENU 360 MG tablet     medroxyPROGESTERone (DEPO-PROVERA) 150 MG/ML IM injection     naloxone (NARCAN) 4 MG/0.1ML nasal spray     ondansetron (ZOFRAN) 8 MG tablet     oxyCODONE IR (ROXICODONE) 10 MG tablet     OXYCONTIN 10 MG 12 hr tablet     sertraline (ZOLOFT) 100 MG tablet     Voxelotor 500 MG TABS     No current facility-administered medications for this visit.          Physical Exam:   There were no vitals taken for this visit. (virtual video)    GEN: young  female, energetic and a bit animated but not overly worked up  HEENT: full head of hair, no icterus, MMM  NECK: no visible asymmetry  RESP: speaking in full sentences, no increased work of breathing  MSK: brace on right wrist and right ankle (AFO)  NEURO: alert and oriented, stable contracture of right wrist.   SKIN: port site c/d/i (visible on video).     The rest of a comprehensive physical examination is deferred due to PHE (public health emergency) video restrictions      Labs:      12/7/2020 19:47   Sodium 140   Potassium 3.5   Chloride 111 (H)   Carbon Dioxide 23   Urea Nitrogen 6 (L)   Creatinine 0.60   GFR Estimate >90   GFR Estimate If Black >90   Calcium 8.7   Anion Gap 6   Albumin 3.8   Protein Total 7.6   Bilirubin Total 3.0 (H)   Alkaline Phosphatase 71   ALT 81 (H)    (H)   Glucose 99   WBC 11.8 (H)   Hemoglobin 7.1 (L)   Hematocrit 20.5 (L)   Platelet Count 379   RBC Count 2.17 (L)   MCV 95   MCH 32.7   MCHC 34.6   RDW 28.4 (H)   Diff Method Manual Differential   %  Neutrophils 66.3   % Lymphocytes 20.9   % Monocytes 5.5   % Eosinophils 6.4   % Basophils 0.9   Nucleated RBCs 34 (H)   Absolute Neutrophil 7.8   Absolute Lymphocytes 2.5   Absolute Monocytes 0.6   Absolute Eosinophils 0.8 (H)   Absolute Basophils 0.1   Absolute Nucleated RBC 4.0   Anisocytosis Marked   Poikilocytosis Marked   Polychromasia Marked   Sickle Cells Marked   Target Cells Moderate   Macrocytes Present   Platelet Estimate Confirming automated cell count   % Retic 23.4 (H)   Absolute Retic 506.9 (H)       Imaging: none today    Assessment:  Jennifer Ceravntes is a 20 year old female with HbSS. Her disease has been complicated by stroke leading to significant cognitive delay and right sided hemiparesis, high anxiety leading to frequent pain crises on top of chronic pain syndrome, poor social structure at home with no real support, and iron overload secondary to repeated transfusions.     Major Topics of the Visit:    # Pain Management: Had a long talk about pain management today. She had a lot of questions about home management as well as use of infusions and ED. Discussed that she should do her best to manage pain at home , though we understand that she will have times in which the pain is not well controlled and she is encouraged to call for IV infusions or go to the ED.  She does not need our permission to go to the ED if she feels like her pain is uncontrolled.  Her current complaint is that she feels like the OxyContin is no longer effective for her and she would like to stop it.  We had a long discussion about likely stopping this will make her pain worse and that is helping her more than she knows that is , though she still wants to try holding the medication.  Since she is on 10 mg twice daily I do not want her just to stop.  I will have her decrease to once a day dosing for a week and then stop.  If her pain were to increase as expected, she should restart the OxyContin.  We will NOT refill opioids in  fewer than 2 weeks and prefer 3-4 weeks.  Since she is wanting to change her pain medications, I feel like this is likely going to be a good time to refer her to pain management though I will talk with Dr. Duncan about his preferences.     Addendum: Discussed with Dr. Duncan. Do not want to send her to pain clinic at this point. Ok to try and decrease dose of Oxycontin to 10 mg daily. Want her to start next week to be able to triage through the week.     #Iron Overload/Pharmacy Issues: It sounds like a combination of insurance restrictions and specialty pharmacy protocols (ie. Confirming a phone call before sending Rx) are combining to cause real problems for Jennifer. We have tried to help with obtaining better access for her. Discussed the importance of being compliant with the Jadenu; she states she is currently taking as prescribed. We did try Oxybryta, though stopping as below. Will reach out to Dr. uDncan to see if he wanted to do in-clinic IV chelation (though we know this is less-than-ideal during the pandemic; she has done this previously though IP)    Addendum: Will plan to rediscuss IV chelation after the holidays, after she has been taking the Jadenu more regularly. Will recheck ferritin at this point     #High RBC turnover: She has a really high degree of RBC turnover, more than most patients sickle patients. Was started on Oxbryta though started having CP and no signs of hgb improvement yet. Will have her stop. Continue monitoring     # Follow up: Will have her follow-up with Andrei Machado PAC in 2 weeks for close followup and reestablishment of care (she needs more continuity of care and Andrei will be returning from maternity leave next week)    Cornelia Suresh PA-C  [facility]  [address], [place] [address] [phone]        Jennifer Cervantes is a 20 year old female who is being evaluated via a billable video visit.      The patient has been notified of following:  "    \"This video visit will be conducted via a call between you and your physician/provider. We have found that certain health care needs can be provided without the need for an in-person physical exam.  This service lets us provide the care you need with a video conversation.  If a prescription is necessary we can send it directly to your pharmacy.  If lab work is needed we can place an order for that and you can then stop by our lab to have the test done at a later time.    Video visits are billed at different rates depending on your insurance coverage.  Please reach out to your insurance provider with any questions.    If during the course of the call the physician/provider feels a video visit is not appropriate, you will not be charged for this service.\"    Patient has given verbal consent for Video visit? Yes  How would you like to obtain your AVS? MyChart  If you are dropped from the video visit, the video invite should be resent to: Text to cell phone: [phone]  Will anyone else be joining your video visit? No       DORI Villalobos        Video-Visit Details    Type of service:  Video Visit    Video Start Time: 1:28 PM  Video End Time: 2:04 PM    Originating Location (pt. Location): Home    Distant Location (provider location):  [facility]     Platform used for Video Visit: Clarisa Suresh PA-C              "

## 2020-12-09 NOTE — PROGRESS NOTES
"Jennifer Cervantes is a 20 year old female who is being evaluated via a billable video visit.      The patient has been notified of following:     \"This video visit will be conducted via a call between you and your physician/provider. We have found that certain health care needs can be provided without the need for an in-person physical exam.  This service lets us provide the care you need with a video conversation.  If a prescription is necessary we can send it directly to your pharmacy.  If lab work is needed we can place an order for that and you can then stop by our lab to have the test done at a later time.    Video visits are billed at different rates depending on your insurance coverage.  Please reach out to your insurance provider with any questions.    If during the course of the call the physician/provider feels a video visit is not appropriate, you will not be charged for this service.\"    Patient has given verbal consent for Video visit? Yes  How would you like to obtain your AVS? MyChart  If you are dropped from the video visit, the video invite should be resent to: Text to cell phone: [phone]  Will anyone else be joining your video visit? No       DORI Villalobos        Video-Visit Details    Type of service:  Video Visit    Video Start Time: 1:28 PM  Video End Time: 2:04 PM    Originating Location (pt. Location): Home    Distant Location (provider location):  [facility]     Platform used for Video Visit: Clarisa Suresh PA-C            "

## 2020-12-10 ENCOUNTER — HOSPITAL ENCOUNTER (EMERGENCY)
Facility: CLINIC | Age: 20
Discharge: HOME | End: 2020-12-11
Attending: EMERGENCY MEDICINE | Admitting: EMERGENCY MEDICINE
Payer: COMMERCIAL

## 2020-12-10 ENCOUNTER — TELEPHONE (OUTPATIENT)
Dept: ONCOLOGY | Facility: CLINIC | Age: 20
End: 2020-12-10

## 2020-12-10 ENCOUNTER — APPOINTMENT (OUTPATIENT)
Dept: GENERAL RADIOLOGY | Facility: CLINIC | Age: 20
End: 2020-12-10
Payer: COMMERCIAL

## 2020-12-10 DIAGNOSIS — R07.9 CHEST PAIN, UNSPECIFIED TYPE: ICD-10-CM

## 2020-12-10 DIAGNOSIS — E87.6 HYPOKALEMIA: ICD-10-CM

## 2020-12-10 DIAGNOSIS — D57.00 SICKLE CELL PAIN CRISIS (H): ICD-10-CM

## 2020-12-10 DIAGNOSIS — E83.42 HYPOMAGNESEMIA: ICD-10-CM

## 2020-12-10 LAB
ALBUMIN SERPL-MCNC: 2.7 G/DL (ref 3.4–5)
ALP SERPL-CCNC: 50 U/L (ref 40–150)
ALT SERPL W P-5'-P-CCNC: 65 U/L (ref 0–50)
ANION GAP SERPL CALCULATED.3IONS-SCNC: 9 MMOL/L (ref 3–14)
ANISOCYTOSIS BLD QL SMEAR: ABNORMAL
AST SERPL W P-5'-P-CCNC: 76 U/L (ref 0–45)
BASOPHILS # BLD AUTO: 0 10E9/L (ref 0–0.2)
BASOPHILS NFR BLD AUTO: 0 %
BILIRUB SERPL-MCNC: 2.5 MG/DL (ref 0.2–1.3)
BUN SERPL-MCNC: 7 MG/DL (ref 7–30)
CALCIUM SERPL-MCNC: 6.1 MG/DL (ref 8.5–10.1)
CHLORIDE SERPL-SCNC: 121 MMOL/L (ref 94–109)
CO2 SERPL-SCNC: 18 MMOL/L (ref 20–32)
CREAT SERPL-MCNC: 0.44 MG/DL (ref 0.52–1.04)
DIFFERENTIAL METHOD BLD: ABNORMAL
EOSINOPHIL # BLD AUTO: 0.4 10E9/L (ref 0–0.7)
EOSINOPHIL NFR BLD AUTO: 3.7 %
ERYTHROCYTE [DISTWIDTH] IN BLOOD BY AUTOMATED COUNT: 25.2 % (ref 10–15)
GFR SERPL CREATININE-BSD FRML MDRD: >90 ML/MIN/{1.73_M2}
GLUCOSE BLDC GLUCOMTR-MCNC: 101 MG/DL (ref 70–99)
GLUCOSE SERPL-MCNC: 63 MG/DL (ref 70–99)
HCG UR QL: NEGATIVE
HCT VFR BLD AUTO: 19.3 % (ref 35–47)
HGB BLD-MCNC: 6.9 G/DL (ref 11.7–15.7)
INTERNAL QC OK POCT: YES
INTERPRETATION ECG - MUSE: NORMAL
LYMPHOCYTES # BLD AUTO: 3.4 10E9/L (ref 0.8–5.3)
LYMPHOCYTES NFR BLD AUTO: 28.7 %
MACROCYTES BLD QL SMEAR: PRESENT
MAGNESIUM SERPL-MCNC: 1.4 MG/DL (ref 1.6–2.3)
MCH RBC QN AUTO: 33 PG (ref 26.5–33)
MCHC RBC AUTO-ENTMCNC: 35.8 G/DL (ref 31.5–36.5)
MCV RBC AUTO: 92 FL (ref 78–100)
MICROCYTES BLD QL SMEAR: PRESENT
MONOCYTES # BLD AUTO: 0.3 10E9/L (ref 0–1.3)
MONOCYTES NFR BLD AUTO: 2.8 %
NEUTROPHILS # BLD AUTO: 7.6 10E9/L (ref 1.6–8.3)
NEUTROPHILS NFR BLD AUTO: 64.8 %
NRBC # BLD AUTO: 2.3 10*3/UL
NRBC BLD AUTO-RTO: 19 /100
PLATELET # BLD AUTO: 298 10E9/L (ref 150–450)
PLATELET # BLD EST: ABNORMAL 10*3/UL
POIKILOCYTOSIS BLD QL SMEAR: ABNORMAL
POLYCHROMASIA BLD QL SMEAR: ABNORMAL
POTASSIUM SERPL-SCNC: 2.6 MMOL/L (ref 3.4–5.3)
POTASSIUM SERPL-SCNC: 3.9 MMOL/L (ref 3.4–5.3)
PROT SERPL-MCNC: 5.3 G/DL (ref 6.8–8.8)
RBC # BLD AUTO: 2.09 10E12/L (ref 3.8–5.2)
RETICS # AUTO: 479.6 10E9/L (ref 25–95)
RETICS/RBC NFR AUTO: 22.4 % (ref 0.5–2)
SICKLE CELLS BLD QL SMEAR: ABNORMAL
SODIUM SERPL-SCNC: 148 MMOL/L (ref 133–144)
TARGETS BLD QL SMEAR: SLIGHT
TROPONIN I SERPL-MCNC: <0.015 UG/L (ref 0–0.04)
WBC # BLD AUTO: 11.7 10E9/L (ref 4–11)

## 2020-12-10 PROCEDURE — 84484 ASSAY OF TROPONIN QUANT: CPT | Performed by: STUDENT IN AN ORGANIZED HEALTH CARE EDUCATION/TRAINING PROGRAM

## 2020-12-10 PROCEDURE — 83735 ASSAY OF MAGNESIUM: CPT | Performed by: STUDENT IN AN ORGANIZED HEALTH CARE EDUCATION/TRAINING PROGRAM

## 2020-12-10 PROCEDURE — 93010 ELECTROCARDIOGRAM REPORT: CPT | Performed by: EMERGENCY MEDICINE

## 2020-12-10 PROCEDURE — 999N001017 HC STATISTIC GLUCOSE BY METER IP

## 2020-12-10 PROCEDURE — 85025 COMPLETE CBC W/AUTO DIFF WBC: CPT | Performed by: STUDENT IN AN ORGANIZED HEALTH CARE EDUCATION/TRAINING PROGRAM

## 2020-12-10 PROCEDURE — 96376 TX/PRO/DX INJ SAME DRUG ADON: CPT | Performed by: EMERGENCY MEDICINE

## 2020-12-10 PROCEDURE — 81025 URINE PREGNANCY TEST: CPT | Performed by: EMERGENCY MEDICINE

## 2020-12-10 PROCEDURE — 258N000003 HC RX IP 258 OP 636: Performed by: STUDENT IN AN ORGANIZED HEALTH CARE EDUCATION/TRAINING PROGRAM

## 2020-12-10 PROCEDURE — 84132 ASSAY OF SERUM POTASSIUM: CPT | Performed by: EMERGENCY MEDICINE

## 2020-12-10 PROCEDURE — 250N000011 HC RX IP 250 OP 636: Performed by: EMERGENCY MEDICINE

## 2020-12-10 PROCEDURE — 96365 THER/PROPH/DIAG IV INF INIT: CPT | Performed by: EMERGENCY MEDICINE

## 2020-12-10 PROCEDURE — 99285 EMERGENCY DEPT VISIT HI MDM: CPT | Mod: 25 | Performed by: EMERGENCY MEDICINE

## 2020-12-10 PROCEDURE — 80053 COMPREHEN METABOLIC PANEL: CPT | Performed by: STUDENT IN AN ORGANIZED HEALTH CARE EDUCATION/TRAINING PROGRAM

## 2020-12-10 PROCEDURE — 71045 X-RAY EXAM CHEST 1 VIEW: CPT

## 2020-12-10 PROCEDURE — 93005 ELECTROCARDIOGRAM TRACING: CPT | Performed by: EMERGENCY MEDICINE

## 2020-12-10 PROCEDURE — 85045 AUTOMATED RETICULOCYTE COUNT: CPT | Performed by: STUDENT IN AN ORGANIZED HEALTH CARE EDUCATION/TRAINING PROGRAM

## 2020-12-10 PROCEDURE — 96375 TX/PRO/DX INJ NEW DRUG ADDON: CPT | Performed by: EMERGENCY MEDICINE

## 2020-12-10 PROCEDURE — 250N000013 HC RX MED GY IP 250 OP 250 PS 637: Performed by: EMERGENCY MEDICINE

## 2020-12-10 PROCEDURE — 96361 HYDRATE IV INFUSION ADD-ON: CPT | Performed by: EMERGENCY MEDICINE

## 2020-12-10 PROCEDURE — 96367 TX/PROPH/DG ADDL SEQ IV INF: CPT | Performed by: EMERGENCY MEDICINE

## 2020-12-10 RX ORDER — SODIUM CHLORIDE, SODIUM LACTATE, POTASSIUM CHLORIDE, CALCIUM CHLORIDE 600; 310; 30; 20 MG/100ML; MG/100ML; MG/100ML; MG/100ML
INJECTION, SOLUTION INTRAVENOUS CONTINUOUS
Status: DISCONTINUED | OUTPATIENT
Start: 2020-12-10 | End: 2020-12-11 | Stop reason: HOSPADM

## 2020-12-10 RX ORDER — POTASSIUM CHLORIDE 1.5 G/1.58G
40 POWDER, FOR SOLUTION ORAL ONCE
Status: COMPLETED | OUTPATIENT
Start: 2020-12-10 | End: 2020-12-10

## 2020-12-10 RX ORDER — MORPHINE SULFATE 2 MG/ML
2 INJECTION, SOLUTION INTRAMUSCULAR; INTRAVENOUS
Status: DISCONTINUED | OUTPATIENT
Start: 2020-12-10 | End: 2020-12-10

## 2020-12-10 RX ORDER — POTASSIUM CHLORIDE 7.45 MG/ML
10 INJECTION INTRAVENOUS ONCE
Status: COMPLETED | OUTPATIENT
Start: 2020-12-10 | End: 2020-12-10

## 2020-12-10 RX ORDER — MORPHINE SULFATE 2 MG/ML
2 INJECTION, SOLUTION INTRAMUSCULAR; INTRAVENOUS
Status: COMPLETED | OUTPATIENT
Start: 2020-12-10 | End: 2020-12-10

## 2020-12-10 RX ORDER — ONDANSETRON 2 MG/ML
8 INJECTION INTRAMUSCULAR; INTRAVENOUS EVERY 8 HOURS PRN
Status: DISCONTINUED | OUTPATIENT
Start: 2020-12-10 | End: 2020-12-10

## 2020-12-10 RX ORDER — KETOROLAC TROMETHAMINE 30 MG/ML
30 INJECTION, SOLUTION INTRAMUSCULAR; INTRAVENOUS ONCE
Status: DISCONTINUED | OUTPATIENT
Start: 2020-12-10 | End: 2020-12-10

## 2020-12-10 RX ORDER — MAGNESIUM SULFATE HEPTAHYDRATE 40 MG/ML
2 INJECTION, SOLUTION INTRAVENOUS ONCE
Status: COMPLETED | OUTPATIENT
Start: 2020-12-10 | End: 2020-12-10

## 2020-12-10 RX ORDER — DIPHENHYDRAMINE HCL 25 MG
25 CAPSULE ORAL EVERY 8 HOURS PRN
Status: DISCONTINUED | OUTPATIENT
Start: 2020-12-10 | End: 2020-12-10

## 2020-12-10 RX ADMIN — POTASSIUM CHLORIDE 10 MEQ: 7.46 INJECTION, SOLUTION INTRAVENOUS at 19:43

## 2020-12-10 RX ADMIN — MAGNESIUM SULFATE 2 G: 2 INJECTION INTRAVENOUS at 21:16

## 2020-12-10 RX ADMIN — SODIUM CHLORIDE, POTASSIUM CHLORIDE, SODIUM LACTATE AND CALCIUM CHLORIDE: 600; 310; 30; 20 INJECTION, SOLUTION INTRAVENOUS at 18:31

## 2020-12-10 RX ADMIN — MORPHINE SULFATE 2 MG: 2 INJECTION, SOLUTION INTRAMUSCULAR; INTRAVENOUS at 18:49

## 2020-12-10 RX ADMIN — POTASSIUM CHLORIDE 40 MEQ: 1.5 POWDER, FOR SOLUTION ORAL at 19:41

## 2020-12-10 RX ADMIN — MORPHINE SULFATE 2 MG: 2 INJECTION, SOLUTION INTRAMUSCULAR; INTRAVENOUS at 22:49

## 2020-12-10 RX ADMIN — MORPHINE SULFATE 2 MG: 2 INJECTION, SOLUTION INTRAMUSCULAR; INTRAVENOUS at 21:10

## 2020-12-10 NOTE — ED PROVIDER NOTES
"ED Provider Note  [facility]      History     Chief Complaint   Patient presents with     Sickle Cell Pain Crisis     HPI  Jennifer Cervantes is a 20 year old female with a past medical history significant for sickle cell disease, asthma, and right-sided cerebral infarction who presents to the ED for evaluation of a suspected sickle cell pain crisis.    She reports 9/10 pain \"all over\" started this morning after she was outside for > 1hr without a blanket or jacket. (Says that the police came to the wrong home searching for someone and forced her outside without protective clothing). She has left sided chest pain but denies SOB, wheezing, numbness in her hands or feet, headache, change in vision, nausea, vomiting. She states that her baseline pain level is 4-5/10. She did not take her pm Oxycontin last night; took 10mg Oxycontin this morning along with 10mg oxycodone. Last BM was this morning.     She was most recently in the ED on 12/7 for a similar complaint; she had a negative chest xray, bilirubin of 3 (baseline), HgB 7 (baseline), reticulocyte count of 506. She was given 3 doses of IV morphine, pain improved, and she was discharged home. She had an appt with hematology yesterday, where they decided to taper her home Oxycontin to 10mg daily.     Per chart review, her care plan includes:    ER/Acute Care/Infusion Clinic:  o Morphine 2 mg IVP/SC Q1H X 3 doses  o Toradol x1  o Maintenance IV fluids with LR  o Other: Benadryl PO and Zofran 8 mg IV PRN itching or nausea    Inpatient   o Opioid: Morphine 2 mg IV Q1H PRN until PCA starts  o PCA plan:   - PCA button dose: Morphine 1 mg  - Lockout: 20 minutes  - Continuous Infusion: consider 1 mg/hr  - One hr max: 4 mg  o Other Medications: Benadryl, Zofran  o If PCA not working, she Has had success with ketamine starting at 4mg/h and advancing only to 6mg/h, as 8mg/h made her feel quite poorly.  o Continue ASA (ok to give celebrex)--discuss " Toradol with hematology first  o Supportive Care: Docusate, Senna  Chronic Pain Medications:  Home regimen  OxyCONTIN 10 mg po q 12 hrs and oxycodone 10 mg p.o. q.6 hours p.r.n. breakthrough pain.  She also continues on Celebrex, and Zoloft among other medications.    -Also benefits from mental health visits, acupuncture  Baseline Hemoglobin: 9.5 g/dL (on chronic transfusions), 7-8 without    Past Medical History  Past Medical History:   Diagnosis Date     Anemia      Anxiety      Bleeding disorder (H)      Blood clotting disorder (H)      Cerebral infarction (H) 2015     Cognitive developmental delay     low IQ. 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 (H)     right hand contractures     Iron overload due to repeated red blood cell transfusions      Migraines      Oppositional defiant behavior      Uncomplicated asthma      Past Surgical History:   Procedure Laterality Date     AS INSERT TUNNELED CV 2 CATH W/O PORT/PUMP       C BREAST REDUCTION (INCLUDES LIPO) TIER 3 Bilateral 04/23/2019     IR CVC NON TUNNEL PLACEMENT  4/7/2020     REPAIR TENDON ELBOW Right 10/2/2019    Procedure: Right Elbow Flexor Lengthening, Flexor Pronator Slide Of Wrist and Finger, Thumb Adductor Lengthening;  Surgeon: Anai Franco MD;  Location: [facility] OR     TONSILLECTOMY Bilateral 10/2/2019    Procedure: Bilateral Tonsillectomy;  Surgeon: Farhana Guy MD;  Location: [facility] OR          acetaminophen (TYLENOL) 325 MG tablet       albuterol (PROVENTIL) (2.5 MG/3ML) 0.083% neb solution       aspirin (ASPIRIN) 81 MG EC tablet       Budesonide-Formoterol Fumarate (SYMBICORT IN)       celecoxib (CELEBREX) 100 MG capsule       diphenhydrAMINE (BENADRYL) 25 MG capsule       Hydroxyurea 1000 MG TABS       ibuprofen (ADVIL/MOTRIN) 200 MG tablet       JADENU 360 MG tablet       medroxyPROGESTERone  (DEPO-PROVERA) 150 MG/ML IM injection       naloxone (NARCAN) 4 MG/0.1ML nasal spray       ondansetron (ZOFRAN) 8 MG tablet       oxyCODONE IR (ROXICODONE) 10 MG tablet       OXYCONTIN 10 MG 12 hr tablet       sertraline (ZOLOFT) 100 MG tablet       Voxelotor 500 MG TABS      Allergies   Allergen Reactions     Fish-Derived Products Hives     Seafood Hives     Contrast Dye      Diagnostic X-Ray Materials      Gadolinium      Family History  Family History   Problem Relation Age of Onset     Sickle Cell Trait Mother      Sickle Cell Trait Father      Social History   Social History     Tobacco Use     Smoking status: Never Smoker     Smokeless tobacco: Never Used   Substance Use Topics     Alcohol use: Not Currently     Alcohol/week: 0.0 standard drinks     Drug use: Never      Past medical history, past surgical history, medications, allergies, family history, and social history were reviewed with the patient. No additional pertinent items.       Review of Systems  A complete review of systems was performed with pertinent positives and negatives noted in the HPI, and all other systems negative.    Physical Exam   BP: 127/64  Pulse: 114  Temp: 97.9  F (36.6  C)  Resp: 16  SpO2: 92 %     Physical Exam  Vitals signs reviewed.   Constitutional:       General: She is not in acute distress.     Comments: patient has a history of low IQ; mild cognitive deficits noted on exam   HENT:      Head: Normocephalic.   Eyes:      Conjunctiva/sclera: Conjunctivae normal.      Pupils: Pupils are equal, round, and reactive to light.   Cardiovascular:      Rate and Rhythm: Regular rhythm. Tachycardia present.      Pulses: Normal pulses.      Heart sounds: Normal heart sounds.   Pulmonary:      Effort: Pulmonary effort is normal.      Breath sounds: Normal breath sounds. No wheezing, rhonchi or rales.   Abdominal:      General: Abdomen is flat.      Palpations: Abdomen is soft. There is no mass.      Tenderness: There is no abdominal  tenderness.   Musculoskeletal:      Right wrist: She exhibits decreased range of motion and deformity (Patient reports 2/2 cerebral infarct when she was an infant).   Skin:     General: Skin is warm and dry.      Capillary Refill: Capillary refill takes less than 2 seconds.      Comments: Port on L chest; clean and dry   Neurological:      Mental Status: She is alert and oriented to person, place, and time.      Cranial Nerves: Cranial nerves are intact.      Sensory: Sensation is intact.      Motor: Weakness (3/5 strength in R ankle flexion, extension; R leg flexion) and abnormal muscle tone (increased tone of right wrist) present.      Comments: Gait not observed; patient has lower leg brace for R leg that she uses at baseline   Psychiatric:         Mood and Affect: Mood normal.       ED Course            EKG Interpretation:      Interpreted by Dr. Patricia Jurado, Dr. Leslie Roper  Time reviewed: 6:05pm  Symptoms at time of EKG: chest pain  Rhythm: normal sinus   Rate: normal  Axis: normal  Ectopy: none  Conduction: normal  ST Segments/ T Waves: No ST-T wave changes  Q Waves: none  Comparison to prior: Unchanged from 12/3/20    Clinical Impression: normal EKG      Results for orders placed or performed during the hospital encounter of 12/10/20   XR Chest Port 1 View     Status: None    Narrative    CHEST ONE VIEW PORTABLE    12/10/2020 7:51 PM     HISTORY: Low SaO2, chest pain.    COMPARISON: Chest x-ray on 12/7/2020.      Impression    IMPRESSION: Single portable AP view of the chest was obtained. Left  chest wall Port-A-Cath distal tip projects over low SVC. Stable  cardiomediastinal silhouette. No suspicious focal pulmonary opacities.  No significant pleural effusion or pneumothorax.    QUIN MARTINEZ MD   CBC with platelets differential     Status: Abnormal   Result Value Ref Range    WBC 11.7 (H) 4.0 - 11.0 10e9/L    RBC Count 2.09 (L) 3.8 - 5.2 10e12/L    Hemoglobin 6.9 (LL) 11.7 - 15.7 g/dL     Hematocrit 19.3 (L) 35.0 - 47.0 %    MCV 92 78 - 100 fl    MCH 33.0 26.5 - 33.0 pg    MCHC 35.8 31.5 - 36.5 g/dL    RDW 25.2 (H) 10.0 - 15.0 %    Platelet Count 298 150 - 450 10e9/L    Diff Method Manual Differential     % Neutrophils 64.8 %    % Lymphocytes 28.7 %    % Monocytes 2.8 %    % Eosinophils 3.7 %    % Basophils 0.0 %    Nucleated RBCs 19 (H) 0 /100    Absolute Neutrophil 7.6 1.6 - 8.3 10e9/L    Absolute Lymphocytes 3.4 0.8 - 5.3 10e9/L    Absolute Monocytes 0.3 0.0 - 1.3 10e9/L    Absolute Eosinophils 0.4 0.0 - 0.7 10e9/L    Absolute Basophils 0.0 0.0 - 0.2 10e9/L    Absolute Nucleated RBC 2.3     Anisocytosis Moderate     Poikilocytosis Marked     Polychromasia Moderate     Sickle Cells Marked     Target Cells Slight     Microcytes Present     Macrocytes Present     Platelet Estimate Confirming automated cell count    Reticulocyte count     Status: Abnormal   Result Value Ref Range    % Retic 22.4 (H) 0.5 - 2.0 %    Absolute Retic 479.6 (H) 25 - 95 10e9/L   Comprehensive metabolic panel     Status: Abnormal   Result Value Ref Range    Sodium 148 (H) 133 - 144 mmol/L    Potassium 2.6 (LL) 3.4 - 5.3 mmol/L    Chloride 121 (H) 94 - 109 mmol/L    Carbon Dioxide 18 (L) 20 - 32 mmol/L    Anion Gap 9 3 - 14 mmol/L    Glucose 63 (L) 70 - 99 mg/dL    Urea Nitrogen 7 7 - 30 mg/dL    Creatinine 0.44 (L) 0.52 - 1.04 mg/dL    GFR Estimate >90 >60 mL/min/[1.73_m2]    GFR Estimate If Black >90 >60 mL/min/[1.73_m2]    Calcium 6.1 (L) 8.5 - 10.1 mg/dL    Bilirubin Total 2.5 (H) 0.2 - 1.3 mg/dL    Albumin 2.7 (L) 3.4 - 5.0 g/dL    Protein Total 5.3 (L) 6.8 - 8.8 g/dL    Alkaline Phosphatase 50 40 - 150 U/L    ALT 65 (H) 0 - 50 U/L    AST 76 (H) 0 - 45 U/L   Troponin I     Status: None   Result Value Ref Range    Troponin I ES <0.015 0.000 - 0.045 ug/L   Magnesium     Status: Abnormal   Result Value Ref Range    Magnesium 1.4 (L) 1.6 - 2.3 mg/dL   Potassium     Status: None   Result Value Ref Range    Potassium 3.9 3.4 -  5.3 mmol/L   Glucose by meter     Status: Abnormal   Result Value Ref Range    Glucose 101 (H) 70 - 99 mg/dL   EKG 12-lead, tracing only     Status: None   Result Value Ref Range    Interpretation ECG Click View Image link to view waveform and result    hCG qual urine POCT     Status: Normal   Result Value Ref Range    HCG Qual Urine Negative neg    Internal QC OK Yes      Medications   lactated ringers infusion ( Intravenous Rate/Dose Verify 12/10/20 2248)   morphine (PF) injection 2 mg (2 mg Intravenous Given 12/10/20 2249)   potassium chloride (KLOR-CON) Packet 40 mEq (40 mEq Oral Given 12/10/20 1941)   potassium chloride 10 mEq in 100 mL sterile water intermittent infusion (premix) (0 mEq Intravenous Stopped 12/10/20 2043)   magnesium sulfate 2 g in water intermittent infusion (0 g Intravenous Stopped 12/10/20 2205)       Assessments & Plan (with Medical Decision Making)   Jennifer Cervantes is a 20 year old female with a past medical history significant for sickle cell disease, asthma, and right-sided cerebral infarction who presents to the ED for evaluation of a suspected sickle cell pain crisis. Given her history of acute chest syndrome and new onset chest pain, we ordered a CXR, which was normal.     HCG negative, CBC (HgB 6.9, WBC 11.7), reticulocyte (479.6), Mag (1.4), Potassium (2.6). Glucose was 63 but patient was asymptomatic for hypoglycemia; food was given and repeat glucose was 101. Ms. Cervantes has a detailed Care Plan, so pain management was followed accordingly. She reported her pain was 9/10 upon arrival, down to 7/10 with 2mg IV morphine, 6/10 after a second dose, and 4/10 after a third dose. She was given LR @ 110mL/hr, oral Potassium 40mEq, IV Potassium 10mEq and Magnesium 2g replacement.    We offered admission to Ms. Cervantes, given her abnormal lab findings and ongoing pain crisis but she stated multiple times that she would prefer to go home and manage her pain from there. Potassium was rechecked and  was 3.9. She was medically cleared and discharged home. We advised her that should symptoms worsen or the pain level become unbearable, she could return to the ED.    I have reviewed the nursing notes. I have reviewed the findings, diagnosis, plan and need for follow up with the patient.    New Prescriptions    No medications on file       Final diagnoses:   Sickle cell pain crisis (H)   Hypokalemia   Hypomagnesemia     Patricia Jurado, DO   she/her/hers   PGY-1  p [phone]    ----------------------------------------------------------------------------------------------------------  ED staff attestation:    I have seen the patient with the resident and agree with documentation.  The above note reflects our jointly agreed upon assessment and plan.  I have independently evaluated the patient separately from the resident.  Briefly, this is a 20-year-old with sickle cell anemia who presents to the ED for a pain crisis after inadvertently being forced outside in the cold without jacket or sweatshirt today.  She is noticing pain all over but particularly in the back.  Evaluation shows hypokalemia with a potassium of 2.6, this was replaced.  She was also hypomagnesemic and this was also replaced.  Hemoglobin is 6.9 which is slightly below her baseline of 7 range.  She is got a reticulocyte count of 22.4% indicating appropriate bone marrow response, no sign of an aplastic crisis.  I do not believe there is any sign of acute chest syndrome.  We followed her care plan and she was given analgesics here in the ED.  At this point she is not interested in admission and would like to go home.  She will be discharged home with instructions for high potassium diet.  Follow-up with hematology clinic.  Patient verbalizes understanding.    [facility] EMERGENCY DEPARTMENT  12/10/2020     Leslie Roper MD  12/11/20 0003

## 2020-12-10 NOTE — ED TRIAGE NOTES
Pt states she is having a sickle cell flair up and her reg prescribed meds are not working for her.  Pt states she normally goes to the [facility] but the nurse line directed here today.

## 2020-12-10 NOTE — ED AVS SNAPSHOT
[facility] Emergency Department  [address]  [place] [address]  Phone: [phone]  Fax: [phone]                                    Jennifer Cervantes   MRN: [id number]    Department: [facility] Emergency Department   Date of Visit: 12/10/2020           After Visit Summary Signature Page    I have received my discharge instructions, and my questions have been answered. I have discussed any challenges I see with this plan with the nurse or doctor.    ..........................................................................................................................................  Patient/Patient Representative Signature      ..........................................................................................................................................  Patient Representative Print Name and Relationship to Patient    ..................................................               ................................................  Date                                   Time    ..........................................................................................................................................  Reviewed by Signature/Title    ...................................................              ..............................................  Date                                               Time          22EPIC Rev 08/18

## 2020-12-10 NOTE — TELEPHONE ENCOUNTER
[facility] Telephone Triage Note    The following symptoms were reported:     Description:            Onset:  This morning, she was sitting outside this morning which aggravated her symptoms  Location: Lower back, bilateral arms  Character: Sharp and Stabbing           Intensity: 8/10    Accompanying Signs & Symptoms:  none    Chest Pain:  Mild chest pain     Shortness of Breath:  Denies     Fever:  Denies     Chills:  Denies   Cough/sore throat:  Denies  Other:  No Known COVID-19 contacts    Therapies Tried and outcome: heat packs, warm blankets, warm bath, Oxycodone and Oxycontin without relief    Improved by: nothing    The following provider was consulted:  Cornelia Suresh PA-C     The following advice/orders were given, and/or interventions recommended:  Per sam Locke for IVF/pain meds today, no labs needed prior.       Patient instructions and/or follow up:  Informed patient her name would be added to infusion list and she would be contacted if an appointment becomes available.     1450: called patient, informed her as of now there are no infusion appointments available, she states she will present to ED for severe/uncontrolled pain.

## 2020-12-11 VITALS
RESPIRATION RATE: 30 BRPM | HEART RATE: 101 BPM | OXYGEN SATURATION: 91 % | TEMPERATURE: 97.9 F | SYSTOLIC BLOOD PRESSURE: 127 MMHG | DIASTOLIC BLOOD PRESSURE: 81 MMHG

## 2020-12-11 NOTE — DISCHARGE INSTRUCTIONS
Make sure to continue staying hydrated, using heating pads for pain, wearing warm jackets outside, staying well rested, and taking your medications as prescribed.    Please make an appointment to follow up with your hematology team within the next week.    If your symptoms worsen or your pain level become unbearable, go to urgent care or return to the Emergency Room.

## 2020-12-17 ENCOUNTER — INFUSION THERAPY VISIT (OUTPATIENT)
Dept: ONCOLOGY | Facility: CLINIC | Age: 20
End: 2020-12-17
Attending: PHYSICIAN ASSISTANT
Payer: COMMERCIAL

## 2020-12-17 ENCOUNTER — APPOINTMENT (OUTPATIENT)
Dept: LAB | Facility: CLINIC | Age: 20
End: 2020-12-17
Attending: PEDIATRICS
Payer: COMMERCIAL

## 2020-12-17 ENCOUNTER — TELEPHONE (OUTPATIENT)
Dept: ONCOLOGY | Facility: CLINIC | Age: 20
End: 2020-12-17

## 2020-12-17 ENCOUNTER — PATIENT OUTREACH (OUTPATIENT)
Dept: CARE COORDINATION | Facility: CLINIC | Age: 20
End: 2020-12-17

## 2020-12-17 VITALS
SYSTOLIC BLOOD PRESSURE: 131 MMHG | RESPIRATION RATE: 16 BRPM | TEMPERATURE: 99 F | OXYGEN SATURATION: 92 % | WEIGHT: 159.5 LBS | BODY MASS INDEX: 27.38 KG/M2 | DIASTOLIC BLOOD PRESSURE: 84 MMHG | HEART RATE: 110 BPM

## 2020-12-17 DIAGNOSIS — D57.00 SICKLE CELL PAIN CRISIS (H): ICD-10-CM

## 2020-12-17 DIAGNOSIS — D57.00 SICKLE CELL CRISIS (H): Primary | ICD-10-CM

## 2020-12-17 LAB
ALBUMIN SERPL-MCNC: 3.9 G/DL (ref 3.4–5)
ALP SERPL-CCNC: 70 U/L (ref 40–150)
ALT SERPL W P-5'-P-CCNC: 89 U/L (ref 0–50)
ANION GAP SERPL CALCULATED.3IONS-SCNC: 7 MMOL/L (ref 3–14)
ANISOCYTOSIS BLD QL SMEAR: ABNORMAL
AST SERPL W P-5'-P-CCNC: 118 U/L (ref 0–45)
BASOPHILS # BLD AUTO: 0.3 10E9/L (ref 0–0.2)
BASOPHILS NFR BLD AUTO: 2.7 %
BILIRUB SERPL-MCNC: 4.4 MG/DL (ref 0.2–1.3)
BUN SERPL-MCNC: 7 MG/DL (ref 7–30)
CALCIUM SERPL-MCNC: 8.6 MG/DL (ref 8.5–10.1)
CHLORIDE SERPL-SCNC: 112 MMOL/L (ref 94–109)
CO2 SERPL-SCNC: 20 MMOL/L (ref 20–32)
CREAT SERPL-MCNC: 0.54 MG/DL (ref 0.52–1.04)
DIFFERENTIAL METHOD BLD: ABNORMAL
EOSINOPHIL # BLD AUTO: 0.3 10E9/L (ref 0–0.7)
EOSINOPHIL NFR BLD AUTO: 2.7 %
ERYTHROCYTE [DISTWIDTH] IN BLOOD BY AUTOMATED COUNT: 24.4 % (ref 10–15)
GFR SERPL CREATININE-BSD FRML MDRD: >90 ML/MIN/{1.73_M2}
GLUCOSE SERPL-MCNC: 89 MG/DL (ref 70–99)
HCT VFR BLD AUTO: 20.6 % (ref 35–47)
HGB BLD-MCNC: 7.6 G/DL (ref 11.7–15.7)
LYMPHOCYTES # BLD AUTO: 2.2 10E9/L (ref 0.8–5.3)
LYMPHOCYTES NFR BLD AUTO: 22.1 %
MACROCYTES BLD QL SMEAR: PRESENT
MCH RBC QN AUTO: 33 PG (ref 26.5–33)
MCHC RBC AUTO-ENTMCNC: 36.9 G/DL (ref 31.5–36.5)
MCV RBC AUTO: 90 FL (ref 78–100)
MICROCYTES BLD QL SMEAR: PRESENT
MONOCYTES # BLD AUTO: 0.9 10E9/L (ref 0–1.3)
MONOCYTES NFR BLD AUTO: 8.8 %
NEUTROPHILS # BLD AUTO: 6.4 10E9/L (ref 1.6–8.3)
NEUTROPHILS NFR BLD AUTO: 63.7 %
NRBC # BLD AUTO: 1 10*3/UL
NRBC BLD AUTO-RTO: 10 /100
PLATELET # BLD AUTO: 271 10E9/L (ref 150–450)
PLATELET # BLD EST: ABNORMAL 10*3/UL
POIKILOCYTOSIS BLD QL SMEAR: ABNORMAL
POLYCHROMASIA BLD QL SMEAR: ABNORMAL
POTASSIUM SERPL-SCNC: 3.8 MMOL/L (ref 3.4–5.3)
PROT SERPL-MCNC: 7.7 G/DL (ref 6.8–8.8)
RBC # BLD AUTO: 2.3 10E12/L (ref 3.8–5.2)
RETICS # AUTO: 318.9 10E9/L (ref 25–95)
RETICS/RBC NFR AUTO: 14.6 % (ref 0.5–2)
SICKLE CELLS BLD QL SMEAR: ABNORMAL
SODIUM SERPL-SCNC: 140 MMOL/L (ref 133–144)
WBC # BLD AUTO: 10 10E9/L (ref 4–11)

## 2020-12-17 PROCEDURE — 85025 COMPLETE CBC W/AUTO DIFF WBC: CPT | Performed by: PHYSICIAN ASSISTANT

## 2020-12-17 PROCEDURE — 80053 COMPREHEN METABOLIC PANEL: CPT | Performed by: PHYSICIAN ASSISTANT

## 2020-12-17 PROCEDURE — 85045 AUTOMATED RETICULOCYTE COUNT: CPT | Performed by: PHYSICIAN ASSISTANT

## 2020-12-17 PROCEDURE — 96374 THER/PROPH/DIAG INJ IV PUSH: CPT

## 2020-12-17 PROCEDURE — 250N000011 HC RX IP 250 OP 636: Performed by: PHYSICIAN ASSISTANT

## 2020-12-17 PROCEDURE — 96361 HYDRATE IV INFUSION ADD-ON: CPT

## 2020-12-17 PROCEDURE — 250N000011 HC RX IP 250 OP 636: Performed by: PEDIATRICS

## 2020-12-17 PROCEDURE — 96375 TX/PRO/DX INJ NEW DRUG ADDON: CPT

## 2020-12-17 PROCEDURE — 258N000003 HC RX IP 258 OP 636: Performed by: PHYSICIAN ASSISTANT

## 2020-12-17 RX ORDER — HEPARIN SODIUM,PORCINE 10 UNIT/ML
5 VIAL (ML) INTRAVENOUS
Status: CANCELLED | OUTPATIENT
Start: 2020-12-17

## 2020-12-17 RX ORDER — ONDANSETRON 8 MG/1
8 TABLET, FILM COATED ORAL
Status: CANCELLED
Start: 2020-12-17

## 2020-12-17 RX ORDER — MORPHINE SULFATE 2 MG/ML
2 INJECTION, SOLUTION INTRAMUSCULAR; INTRAVENOUS
Status: COMPLETED | OUTPATIENT
Start: 2020-12-17 | End: 2020-12-17

## 2020-12-17 RX ORDER — HEPARIN SODIUM (PORCINE) LOCK FLUSH IV SOLN 100 UNIT/ML 100 UNIT/ML
5 SOLUTION INTRAVENOUS
Status: CANCELLED | OUTPATIENT
Start: 2020-12-17

## 2020-12-17 RX ORDER — DIPHENHYDRAMINE HCL 25 MG
25 CAPSULE ORAL
Status: DISCONTINUED | OUTPATIENT
Start: 2020-12-17 | End: 2020-12-17 | Stop reason: HOSPADM

## 2020-12-17 RX ORDER — ONDANSETRON 8 MG/1
8 TABLET, ORALLY DISINTEGRATING ORAL
Status: DISCONTINUED | OUTPATIENT
Start: 2020-12-17 | End: 2020-12-17 | Stop reason: HOSPADM

## 2020-12-17 RX ORDER — HEPARIN SODIUM (PORCINE) LOCK FLUSH IV SOLN 100 UNIT/ML 100 UNIT/ML
5 SOLUTION INTRAVENOUS ONCE
Status: COMPLETED | OUTPATIENT
Start: 2020-12-17 | End: 2020-12-17

## 2020-12-17 RX ORDER — MORPHINE SULFATE 2 MG/ML
2 INJECTION, SOLUTION INTRAMUSCULAR; INTRAVENOUS
Status: CANCELLED
Start: 2020-12-17

## 2020-12-17 RX ORDER — DIPHENHYDRAMINE HCL 25 MG
25 CAPSULE ORAL
Status: CANCELLED
Start: 2020-12-17

## 2020-12-17 RX ORDER — HEPARIN SODIUM (PORCINE) LOCK FLUSH IV SOLN 100 UNIT/ML 100 UNIT/ML
5 SOLUTION INTRAVENOUS
Status: DISCONTINUED | OUTPATIENT
Start: 2020-12-17 | End: 2020-12-17 | Stop reason: HOSPADM

## 2020-12-17 RX ADMIN — MORPHINE SULFATE 2 MG: 2 INJECTION, SOLUTION INTRAMUSCULAR; INTRAVENOUS at 12:12

## 2020-12-17 RX ADMIN — Medication 5 ML: at 10:35

## 2020-12-17 RX ADMIN — MORPHINE SULFATE 2 MG: 2 INJECTION, SOLUTION INTRAMUSCULAR; INTRAVENOUS at 13:21

## 2020-12-17 RX ADMIN — Medication 5 ML: at 13:56

## 2020-12-17 RX ADMIN — MORPHINE SULFATE 2 MG: 2 INJECTION, SOLUTION INTRAMUSCULAR; INTRAVENOUS at 11:10

## 2020-12-17 RX ADMIN — DEXTROSE AND SODIUM CHLORIDE 500 ML: 5; 450 INJECTION, SOLUTION INTRAVENOUS at 11:06

## 2020-12-17 ASSESSMENT — PAIN SCALES - GENERAL: PAINLEVEL: EXTREME PAIN (9)

## 2020-12-17 NOTE — PROGRESS NOTES
Infusion Nursing Note:  Jennifer Cervantes presents today for IVF and Pain Medications.    Patient seen by provider today: No   present during visit today: Not Applicable.    Note: Patient arrives to infusion today with pain rating 9/10 in her lower back and left side. She states that lower back pain is her normal sickle cell pain but the left sided pain seems to be new. Patient states that she would like her pain to be closer to 5-6/10 at time of discharge. Denies any shortness of breath, chest pain, fever/chills, or cough.    Hgb 7.6. Order to transfuse RBC's for Hgb less than 10. RONALDO Cuenca paged to clarify order.    TORB @ 1732 RONALDO Cuenca/ Maritza Bautista RN:  - Do NOT need to transfuse RBC's for Hgb of 7.6  - Continue to monitor left sided back pain and notify her if any symptoms worsen/change    IV fluid and 3 doses of IV Morphine given in infusion. Patient rated pain 6/10 at time of discharge. Patient verbalized that she felt okay to discharge at this time.     Intravenous Access:  Implanted Port.    Treatment Conditions:  Lab Results   Component Value Date    HGB 7.6 12/17/2020     Lab Results   Component Value Date    WBC 10.0 12/17/2020      Lab Results   Component Value Date    ANEU 6.4 12/17/2020     Lab Results   Component Value Date     12/17/2020      Lab Results   Component Value Date     12/17/2020                   Lab Results   Component Value Date    POTASSIUM 3.8 12/17/2020           Lab Results   Component Value Date    MAG 1.4 12/10/2020            Lab Results   Component Value Date    CR 0.54 12/17/2020                   Lab Results   Component Value Date    MICAH 8.6 12/17/2020                Lab Results   Component Value Date    BILITOTAL 4.4 12/17/2020           Lab Results   Component Value Date    ALBUMIN 3.9 12/17/2020                    Lab Results   Component Value Date    ALT 89 12/17/2020           Lab Results   Component Value Date      12/17/2020       Results reviewed, labs MET treatment parameters, ok to proceed with treatment.    Post Infusion Assessment:  Patient tolerated infusion without incident.  Blood return noted pre and post infusion.  Site patent and intact, free from redness, edema or discomfort.  No evidence of extravasations.  Access discontinued per protocol.     Discharge Plan:   Patient declined prescription refills.  Discharge instructions reviewed with: Patient.  Patient and/or family verbalized understanding of discharge instructions and all questions answered.  AVS to patient via [employer].  Patient will return 12/23 for next provider appointment.   Patient discharged in stable condition accompanied by: self.  Departure Mode: Ambulatory.    Maritza Bautista RN

## 2020-12-17 NOTE — TELEPHONE ENCOUNTER
Per Cornelia: ok for infusion and labs today, ask pt if she did decrease Oxycontin to daily and if she noticed increase in pain by doing so. Pt to see Andrei HIGGINS on 12/23, MAURISIO held slot and writer informed pt.    Pt scheduled for 1030 labs, 11 am infusion in [facility], pt accepted and scheduling messaged.    Pt confirmed she has been taking oxycontin once daily since she spoke with Cornelia Suresh ANDREAS on 12/9. Stated Dr. Duncan did call her on Monday to discuss her pain med use and will follow-up end of this week or early next week to see how she is doing with this plan. She has been using her Oxycodone IR 10 mg every 6 hours and feels pain has not been worse after decreasing the Oxycontin. She will try to call  insurance for a ride but realizing time sensitivity for apt at 1030 today, writer also paged SW to assist in securing ride.

## 2020-12-17 NOTE — NURSING NOTE
Chief Complaint   Patient presents with     Port Draw     Labs drawn via port by RN in lab. VS taken.      Port accessed with 20g gripper needle by RN, labs collected, line flushed with saline and heparin.  Vitals taken. Pt checked in for appointment(s).    Shantell ARMIJO RN PHN BSN  BMT/Oncology Lab

## 2020-12-17 NOTE — PATIENT INSTRUCTIONS
Dear Patients and Caregivers,    Each day we work diligently to eliminate any barriers to your care including working with your insurance coverage to preauthorize your facility administered medication treatment. Our goal is to be transparent with any anticipated concerns with coverage for your treatment. At the beginning of every year this goal is particularly challenging because of changes to insurance coverage.    How can you help?    Provide any new insurance card to the infusion nurse at your next appointment or enter the information into your exoro system account prior to January 1st 2021.     It is vital that if a  reaches out that you return their phone call in a timely manner. Due to regulations, the team is unable to leave detailed voicemails. When you return the finance teams call they will be able to inform you of the details so a decision about your appointment can be made.    Also please understand:    We go through this process each year and each year offers new challenges.    Insurance companies will not allow the reauthorization process to begin until 2021, not allowing us to work in advance on this process.    Even if you are not changing insurance plans, insurance companies often update their policies requiring all treatments to be reauthorized.    As institutions across the country work to reauthorize treatments, insurance companies sometimes have longer than usual wait times in their call centers and leads to delayed response to prior authorization requests.     Thank you for your patience as we work this process. We do strive to keep you updated throughout the process if there are any delays or if the process is taking longer than anticipated. Lastly please feel free to speak with one of our infusion finance specialists at your next appointment or via phone ([phone]) if you have any questions. Thank you for choosing [facility] for your care.    Contact Numbers  [facility]: [phone] (for symptom and scheduling needs)    Please call the [facility] Triage line if you experience a temperature greater than or equal to 100.4, shaking chills, have uncontrolled nausea, vomiting and/or diarrhea, dizziness, shortness of breath, chest pain, bleeding, unexplained bruising, or if you have any other new/concerning symptoms, questions or concerns.     If you are having any concerning symptoms or wish to speak to a provider before your next infusion visit, please call your care coordinator or triage to notify them so we can adequately serve you.     If you need a refill on a narcotic prescription or other medication, please call triage before your infusion appointment.          December 2020 Sunday Monday Tuesday Wednesday Thursday Friday Saturday             1     2     3    LAB CENTRAL   1:15 PM   (15 min.)   [facility]   [facility]    Admission  10:16 PM   Raul Diaz DO   [facility] Emergency Department   (Discharge: 12/4/2020)    XR CHEST 2 VIEWS  11:20 PM   (15 min.)   [id number]   [facility] Imaging 4    VIDEO VISIT RETURN   2:45 PM   (50 min.)   Pavithra Ventura APRN CNP   [facility] 5       6     7    Admission   7:26 PM   Mamie Castro MD   [facility] Emergency Department   (Discharge: 12/7/2020)    XR CHEST PORT 1 VIEW   9:20 PM   (20 min.)   [id number]   [facility] Imaging 8    LAB CENTRAL   1:15 PM   (15 min.)   [facility]   [facility]    LAB   1:45 PM   (15 min.)   [facility]   [facility]    HAND FOLLOW UP   2:30 PM   (60 min.)   Franchesca Tucker OT   [facility] Hand Therapy 9    VIDEO VISIT RETURN   1:05 PM   (50 min.)   Cornelia Suresh PA-C   [facility] 10    Admission   5:11 PM   Leslie Roper MD   [facility] Emergency  Department   (Discharge: 12/11/2020)    XR CHEST PORT 1 VIEW   7:35 PM   (20 min.)   [id number]   [facility] Imaging 11     12       13     14     15     16     17    LAB CENTRAL  10:30 AM   (15 min.)   [facility]   [facility] ONC INFUSION 120  11:00 AM   (120 min.)   [facility] ONCOLOGY INFUSION   [facility] 18     19       20     21     22     23    VIDEO VISIT RETURN   1:35 PM   (50 min.)   Andrei Machado PA   [facility] 24     25     26       27     28     29     30     31    HAND FOLLOW UP  11:00 AM   (60 min.)   Franchesca Tucker OT   [facility] Hand Therapy                       January 2021 Sunday Monday Tuesday Wednesday Thursday Friday Saturday                            1     2       3     4     5     6     7     8     9       10     11     12     13    LAB  10:00 AM   (15 min.)    LAB   [facility] Lab Morning Sun 14     15    [facility] RETURN   2:45 PM   (30 min.)   Eric Duncan MD   [facility] 16       17     18     19     20     21     22     23       24     25     26     27     28     29     30       31                                                   Lab Results:  Recent Results (from the past 12 hour(s))   Reticulocyte count    Collection Time: 12/17/20 10:39 AM   Result Value Ref Range    % Retic 14.6 (H) 0.5 - 2.0 %    Absolute Retic 318.9 (H) 25 - 95 10e9/L   Comprehensive metabolic panel    Collection Time: 12/17/20 10:39 AM   Result Value Ref Range    Sodium 140 133 - 144 mmol/L    Potassium 3.8 3.4 - 5.3 mmol/L    Chloride 112 (H) 94 - 109 mmol/L    Carbon Dioxide 20 20 - 32 mmol/L    Anion Gap 7 3 - 14 mmol/L    Glucose 89 70 - 99 mg/dL    Urea Nitrogen 7 7 - 30 mg/dL    Creatinine 0.54 0.52 - 1.04 mg/dL    GFR Estimate >90 >60 mL/min/[1.73_m2]    GFR Estimate If Black >90 >60 mL/min/[1.73_m2]    Calcium 8.6 8.5 - 10.1 mg/dL     Bilirubin Total 4.4 (H) 0.2 - 1.3 mg/dL    Albumin 3.9 3.4 - 5.0 g/dL    Protein Total 7.7 6.8 - 8.8 g/dL    Alkaline Phosphatase 70 40 - 150 U/L    ALT 89 (H) 0 - 50 U/L     (H) 0 - 45 U/L   CBC with platelets differential    Collection Time: 12/17/20 10:39 AM   Result Value Ref Range    WBC 10.0 4.0 - 11.0 10e9/L    RBC Count 2.30 (L) 3.8 - 5.2 10e12/L    Hemoglobin 7.6 (L) 11.7 - 15.7 g/dL    Hematocrit 20.6 (L) 35.0 - 47.0 %    MCV 90 78 - 100 fl    MCH 33.0 26.5 - 33.0 pg    MCHC 36.9 (H) 31.5 - 36.5 g/dL    RDW 24.4 (H) 10.0 - 15.0 %    Platelet Count 271 150 - 450 10e9/L    Diff Method Manual Differential     % Neutrophils 63.7 %    % Lymphocytes 22.1 %    % Monocytes 8.8 %    % Eosinophils 2.7 %    % Basophils 2.7 %    Nucleated RBCs 10 (H) 0 /100    Absolute Neutrophil 6.4 1.6 - 8.3 10e9/L    Absolute Lymphocytes 2.2 0.8 - 5.3 10e9/L    Absolute Monocytes 0.9 0.0 - 1.3 10e9/L    Absolute Eosinophils 0.3 0.0 - 0.7 10e9/L    Absolute Basophils 0.3 (H) 0.0 - 0.2 10e9/L    Absolute Nucleated RBC 1.0     Anisocytosis Marked     Poikilocytosis Marked     Polychromasia Marked     Sickle Cells Marked     Microcytes Present     Macrocytes Present     Platelet Estimate Confirming automated cell count

## 2020-12-17 NOTE — PROGRESS NOTES
Social Work Follow-Up Encounter Visit  Oncology Clinic    Data/Intervention:  Patient Name:  Jennifer Cervantes  /Age:  2000 (20 year old)    Reason for Follow-Up:  Transportation needs    Collaborated With:    -[facility]  -[facility]  -[facility] staff        Resources Provided:  Ride coordination    Assessment:   followed up on message from infusion clinic identifying that patient would need a ride arranged to today's infusion appointment.  called patient's ride service through their insurance plan, arranged ride, and called to confirm with patient they made it to their appointment.  communicated ride was arranged to infusion clinic.     Plan:  Previously provided patient/family with writer's contact information and availability.    will remain available as needed.     Corry GONZALEZ, NATALIA  - Oncology  Phone : [phone]  Pager: [phone]

## 2020-12-17 NOTE — TELEPHONE ENCOUNTER
Pt called in to triage requesting IVF pain meds for low back pain rated 8.5/10 x12 hours days. Stated last took prn Oxy 10 mg this morning without relief, also took scheduled Oxycontin. Denied any fevers, chills, cough, sob, chest pain or other symptoms. Pt's last infusion was 11/17, last clinic visit 12/9 with follow-up on 1/15/21 with Dr. Duncan. Pt denied being out of home medications and needing any refills today.     Per Cornelia JOHNS's OV note 12/9, pt was to follow-up with Andrei HIGGINS in 2 weeks since she wanted to decrease her oxycontin use to daily. This has not been scheduled. Paged Cornelia.

## 2020-12-21 DIAGNOSIS — D57.1 HB-SS DISEASE WITHOUT CRISIS (H): ICD-10-CM

## 2020-12-21 DIAGNOSIS — D57.00 SICKLE CELL CRISIS (H): ICD-10-CM

## 2020-12-21 RX ORDER — OXYCODONE HYDROCHLORIDE 10 MG/1
TABLET ORAL
Qty: 60 TABLET | Refills: 0 | Status: SHIPPED | OUTPATIENT
Start: 2020-12-21 | End: 2021-01-05

## 2020-12-21 NOTE — PROGRESS NOTES
"Jennifer Cervantes is a 20 year old female who is being evaluated via a billable video visit.      The patient has been notified of following:     \"This video visit will be conducted via a call between you and your physician/provider. We have found that certain health care needs can be provided without the need for an in-person physical exam.  This service lets us provide the care you need with a video conversation.  If a prescription is necessary we can send it directly to your pharmacy.  If lab work is needed we can place an order for that and you can then stop by our lab to have the test done at a later time.    Video visits are billed at different rates depending on your insurance coverage.  Please reach out to your insurance provider with any questions.    If during the course of the call the physician/provider feels a video visit is not appropriate, you will not be charged for this service.\"    Patient has given verbal consent for Video visit? Yes  How would you like to obtain your AVS? MyChart  If you are dropped from the video visit, the video invite should be resent to: Text to cell phone: [phone]  Will anyone else be joining your video visit? No      Video-Visit Details    Type of service:  Video Visit    Video Start Time: 1:53pm  Video End Time: 2:23pm    Originating Location (pt. Location): Home    Distant Location (provider location):  [facility]     Platform used for Video Visit: RONALDO Bravo    Vitals - Patient Reported  Weight (Patient Reported): 72.6 kg (160 lb)  Height (Patient Reported): 162.6 cm (5' 4\")  BMI (Based on Pt Reported Ht/Wt): 27.46  Pain Score: No Pain (0)    Oncology/Hematology Visit Note  Dec 23, 2020    Reason for Visit: Follow up of sickle cell hgbSS disease     History of Present Illness: HgbSS complicated by frequent pain crises (acute and chronic components), history of stroke leading to significant cognitive delays and right " upper extremity hemiparesis, iron overload 2/2 chronic transfusions as secondary ppx post-CVA, anxiety/depression. Please see Dr. Duncan's detailed note from 2/28/20 for complete hematologic history.    She was called today for hematology follow-up.       Interval History:  Jennifer was called today for hematology follow-up. She overall is feeling well. She is now on one Oxycontin per day and her pain is stable-taking Oxycodone PRN appx every 6 hours along with alternating Tylenol and Ibuprofen. Has Celebrex but doesn't use that often. She mainly gets pain in her back and extremities. The chest pain she had while on Voxeletor is resolved.    She denies fevers, headaches, dizziness, chest pain, SOB, cough, nausea, vomiting, abdominal pain, bowel or bladder concerns, edema, rashes. Her prior GI upset and trouble eating has completely resolved. She denies any concerns with anxiety or depression, stating mood is stable on Sertraline. Things at home are going fine though she would like to find her own place to live at some point.    Current Outpatient Medications   Medication Sig Dispense Refill     acetaminophen (TYLENOL) 325 MG tablet Take 2 tablets (650 mg) by mouth every 6 hours as needed for mild pain 120 tablet 3     albuterol (PROAIR HFA/PROVENTIL HFA/VENTOLIN HFA) 108 (90 Base) MCG/ACT inhaler Inhale 2 puffs into the lungs every 6 hours as needed for shortness of breath / dyspnea or wheezing 8.5 g 3     albuterol (PROVENTIL) (2.5 MG/3ML) 0.083% neb solution Take 1 vial (2.5 mg) by nebulization every 6 hours as needed for shortness of breath / dyspnea or wheezing 12 mL 4     aspirin (ASPIRIN) 81 MG EC tablet Take 1 tablet (81 mg) by mouth daily 90 tablet 3     budesonide-formoterol (SYMBICORT) 160-4.5 MCG/ACT Inhaler Inhale 2 puffs into the lungs 2 times daily 10.2 g 3     celecoxib (CELEBREX) 100 MG capsule Take 1 capsule (100 mg) by mouth 2 times daily 60 capsule 3     diphenhydrAMINE (BENADRYL) 25 MG capsule  Take 1-2 capsules (25-50 mg) by mouth nightly as needed for sleep 60 capsule 3     hydroxyurea (HYDREA) 500 MG capsule Take 4 capsules (2,000 mg) by mouth daily 120 capsule 3     Hydroxyurea 1000 MG TABS Take 2,000 mg by mouth daily 60 tablet 3     ibuprofen (ADVIL/MOTRIN) 200 MG tablet Take 3 tablets (600 mg) by mouth every 6 hours as needed for moderate pain 90 tablet 3     JADENU 360 MG tablet Take 4 tablets (1,440 mg) by mouth daily 120 tablet 4     medroxyPROGESTERone (DEPO-PROVERA) 150 MG/ML IM injection Inject 150 mg into the muscle       naloxone (NARCAN) 4 MG/0.1ML nasal spray Spray 1 spray (4 mg) into one nostril alternating nostrils once as needed for opioid reversal every 2-3 minutes until assistance arrives 0.2 mL 1     ondansetron (ZOFRAN) 8 MG tablet        oxyCODONE IR (ROXICODONE) 10 MG tablet Take 1 tablet (10mg) by mouth every 6 hours for pain. If, after 2 doses it is not working, try a dose at 4 hours and call clinic. 60 tablet 0     OXYCONTIN 10 MG 12 hr tablet Take 1 tablet (10 mg) by mouth every 12 hours 60 tablet 0     sertraline (ZOLOFT) 100 MG tablet Take 2 tablets (200 mg) by mouth daily 180 tablet 3     Physical Examination:  There were no vitals taken for this visit.  Wt Readings from Last 10 Encounters:   12/17/20 72.3 kg (159 lb 8 oz)   12/07/20 70.3 kg (155 lb)   12/03/20 71.7 kg (158 lb)   11/17/20 72.1 kg (159 lb)   11/06/20 71.8 kg (158 lb 6.4 oz)   10/14/20 70.6 kg (155 lb 11.2 oz)   10/14/20 70.6 kg (155 lb 11.2 oz)   10/08/20 68.4 kg (150 lb 11.2 oz)   07/20/20 68.9 kg (152 lb)   07/17/20 68.9 kg (152 lb)     Video Physical Exam  General: Patient appears well in no acute distress.   Skin: No visualized rash or lesions on visualized skin  Eyes: EOMI, no erythema, sclera icterus or discharge noted  Resp: Appears to be breathing comfortably without accessory muscle usage, speaking in full sentences, no cough  MSK: Appears to have normal range of motion based on visualized  movements  Neurologic: No apparent tremors, facial movements symmetric  Psych: affect bright, alert and oriented    The rest of a comprehensive physical examination is deferred due to PHE (public health emergency) video restrictions    Laboratory Data:  Results for HIMANSHU AL (MRN [id number]) as of 12/24/2020 11:22   12/17/2020 10:39   Sodium 140   Potassium 3.8   Chloride 112 (H)   Carbon Dioxide 20   Urea Nitrogen 7   Creatinine 0.54   GFR Estimate >90   GFR Estimate If Black >90   Calcium 8.6   Anion Gap 7   Albumin 3.9   Protein Total 7.7   Bilirubin Total 4.4 (H)   Alkaline Phosphatase 70   ALT 89 (H)    (H)   Glucose 89   WBC 10.0   Hemoglobin 7.6 (L)   Hematocrit 20.6 (L)   Platelet Count 271   RBC Count 2.30 (L)   MCV 90   MCH 33.0   MCHC 36.9 (H)   RDW 24.4 (H)   Diff Method Manual Differential   % Neutrophils 63.7   % Lymphocytes 22.1   % Monocytes 8.8   % Eosinophils 2.7   % Basophils 2.7   Nucleated RBCs 10 (H)   Absolute Neutrophil 6.4   Absolute Lymphocytes 2.2   Absolute Monocytes 0.9   Absolute Eosinophils 0.3   Absolute Basophils 0.3 (H)   Absolute Nucleated RBC 1.0   Anisocytosis Marked   Poikilocytosis Marked   Polychromasia Marked   Sickle Cells Marked   Microcytes Present   Macrocytes Present   Platelet Estimate Confirming automated cell count   % Retic 14.6 (H)   Absolute Retic 318.9 (H)       Assessment and Plan:  1. Sickle Cell Disease  HgbSS, currently doing well on our video visit. Prior chest pain resolved with stopping Voxeletor, while continue holding.    Labs: Reviewed recent labs-overall stable. Does have notable anemia and elevated retic which are at her baseline along with hyperbilirubinemia. Recheck labs when she sees Dr. Duncan in January.    Meds: Stopped Vox. Continue ASA for history of stroke. Continue Hydrea 2000mg daily. Contineu Jadenu 4 tablets daily. OK to not be on folic acid given so many other medications    Pain Control: Continue weaning off OxyContin-will  talk to Dr. Duncan about a different long acting pain med per her request. Continue Oxycodone PRN, Tylenol, Ibuprofen, Celebrex PRN. Currently pain controlled during our visit.     Follow-up: Dr. Duncan in January.    Addendum: Spoke with Dr. Duncan and given no change in pain with or without long acting OxyContin no need to start a different long acting at this time and will see how she does with short acting Oxycodone alone.     2. Neuro  History of stroke, no new concerns. Continue ASA    3. Psych  History of anxiety and depression, denies any current concerns. Continue Sertraline. Does well with consistent care-will try to make sure she can follow with myself and Dr. Duncan.    Continue Benadryl PRN for insomnia, working well.    4. Pulm  History of asthma, continue Symbicort (reviewed outside records to concern dose 160-4.5) and Albuterol PRN. No active concerns.     Sickle Cell Health Maintenance    1. Immunizations  -Meningococcal: Up to date  -Pneumonia vaccinations: Up to date  -Annual flu shot: Recommend she get and she will think about    2. Infection Screening  -Hepatitis C screening for high-risk (I.e. multiple transfusions) and/or born between 7051-2600. Will add to next labs.     3. Eyes  -Annual ophthalmology visit even if prior normal testing. Last eye doctor visit sometime in 2020 per report.     4. Cardiopulmonary  -No recommended EKG, echo, or pulmonary testing routinely however should perform if symptomatic. No current cardiac or respiratory symptoms  -Strongly consider echo and sleep study if symptoms or signs of pulm HTN/sleep apnea (exercise intolerance, fatigue, weight gain, edema, SOB). no current symptoms.      5. Renal  -Annual UA to screen for microalbuminuria/proteinuria. Last UA performed August 2020 and negative.   -If proteinuria (>300g/24 hr) present, refer to nephrology  -If microalbuminuria (30-300g/24 hr) present, initiate low dose ACE-inhibitor therapy, even with normal  BP    6. MSK  -Imaging (XR, MRI) if symptoms raise concerns for AVN. Refer to PT and/or ortho if AVN changes are seen. No concerns.    7. Iron Overload  -10+ transfusions lifetime are at high risk of iron overload  -Annual ferritin monitoring for chronically-transfused patients. Last ferritin checked and >10K. Continue Jadenu and per Dr. Duncan may need to consider adding Desferal-will recheck Ferritin in January  -If signs of iron overload, annual cardiac MRI and LFT monitoring. Will order heart and liver MRI.   -Refer to hepatology if concerns for hepatic dysfunction-monitor LFTs    Andrei Machado PA-C  [facility]  [address] [place] [address] [phone]

## 2020-12-21 NOTE — TELEPHONE ENCOUNTER
Narcotic Refill     Medication(s) Requested: Oxycodone 10 mg    Last refilled date and by who: 12/8/2020 Dr David Dunacn for 15 tablets     reviewed and noted:     What pain is the medication treating: Sickle cell pain    How is the patient actually taking the medication (not just want the directions say): 1 tablet every 6 hours, able to stretch out longer sometimes    Is their pain being adequately controlled on the current regimen: Yes, able to stay out of infusion and ER with current regimen    Experiencing any side effects from medication (sedation, constipation, etc): Denies

## 2020-12-23 ENCOUNTER — HOSPITAL ENCOUNTER (EMERGENCY)
Facility: CLINIC | Age: 20
Discharge: HOME | End: 2020-12-24
Attending: EMERGENCY MEDICINE | Admitting: EMERGENCY MEDICINE
Payer: COMMERCIAL

## 2020-12-23 ENCOUNTER — VIRTUAL VISIT (OUTPATIENT)
Dept: ONCOLOGY | Facility: CLINIC | Age: 20
End: 2020-12-23
Attending: PHYSICIAN ASSISTANT
Payer: COMMERCIAL

## 2020-12-23 DIAGNOSIS — E83.111 IRON OVERLOAD DUE TO REPEATED RED BLOOD CELL TRANSFUSIONS: ICD-10-CM

## 2020-12-23 DIAGNOSIS — J45.909 ASTHMATIC BRONCHITIS WITHOUT COMPLICATION, UNSPECIFIED ASTHMA SEVERITY, UNSPECIFIED WHETHER PERSISTENT: ICD-10-CM

## 2020-12-23 DIAGNOSIS — Z86.73 HISTORY OF STROKE: ICD-10-CM

## 2020-12-23 DIAGNOSIS — D57.00 SICKLE CELL PAIN CRISIS (H): ICD-10-CM

## 2020-12-23 DIAGNOSIS — F41.9 ANXIETY: ICD-10-CM

## 2020-12-23 DIAGNOSIS — D57.1 HB-SS DISEASE WITHOUT CRISIS (H): Primary | ICD-10-CM

## 2020-12-23 DIAGNOSIS — G47.00 INSOMNIA, UNSPECIFIED TYPE: ICD-10-CM

## 2020-12-23 PROCEDURE — 99284 EMERGENCY DEPT VISIT MOD MDM: CPT | Performed by: EMERGENCY MEDICINE

## 2020-12-23 PROCEDURE — 96361 HYDRATE IV INFUSION ADD-ON: CPT

## 2020-12-23 PROCEDURE — 99285 EMERGENCY DEPT VISIT HI MDM: CPT | Mod: 25

## 2020-12-23 PROCEDURE — 96375 TX/PRO/DX INJ NEW DRUG ADDON: CPT

## 2020-12-23 PROCEDURE — 96376 TX/PRO/DX INJ SAME DRUG ADON: CPT

## 2020-12-23 PROCEDURE — 99214 OFFICE O/P EST MOD 30 MIN: CPT | Mod: GT | Performed by: PHYSICIAN ASSISTANT

## 2020-12-23 PROCEDURE — 96374 THER/PROPH/DIAG INJ IV PUSH: CPT

## 2020-12-23 RX ORDER — BUDESONIDE AND FORMOTEROL FUMARATE DIHYDRATE 160; 4.5 UG/1; UG/1
2 AEROSOL RESPIRATORY (INHALATION) 2 TIMES DAILY
Qty: 10.2 G | Refills: 3 | Status: ON HOLD | OUTPATIENT
Start: 2020-12-23 | End: 2021-05-11

## 2020-12-23 RX ORDER — SERTRALINE HYDROCHLORIDE 100 MG/1
200 TABLET, FILM COATED ORAL DAILY
Qty: 180 TABLET | Refills: 3 | Status: SHIPPED | OUTPATIENT
Start: 2020-12-23 | End: 2021-06-04

## 2020-12-23 RX ORDER — ALBUTEROL SULFATE 0.83 MG/ML
2.5 SOLUTION RESPIRATORY (INHALATION) EVERY 6 HOURS PRN
Qty: 12 ML | Refills: 4 | Status: SHIPPED | OUTPATIENT
Start: 2020-12-23 | End: 2021-09-20

## 2020-12-23 RX ORDER — DIPHENHYDRAMINE HCL 25 MG
25-50 CAPSULE ORAL
Qty: 60 CAPSULE | Refills: 3 | Status: SHIPPED | OUTPATIENT
Start: 2020-12-23 | End: 2021-03-26

## 2020-12-23 RX ORDER — ALBUTEROL SULFATE 90 UG/1
2 AEROSOL, METERED RESPIRATORY (INHALATION) EVERY 6 HOURS PRN
Qty: 8.5 G | Refills: 3 | Status: SHIPPED | OUTPATIENT
Start: 2020-12-23 | End: 2021-09-20

## 2020-12-23 RX ORDER — HYDROXYUREA 500 MG/1
2000 CAPSULE ORAL DAILY
Qty: 120 CAPSULE | Refills: 3 | Status: SHIPPED | OUTPATIENT
Start: 2020-12-23 | End: 2021-01-27

## 2020-12-23 ASSESSMENT — MIFFLIN-ST. JEOR: SCORE: 1471.22

## 2020-12-23 NOTE — ED AVS SNAPSHOT
[facility]  [address] [address]  Phone: [phone]                                    Jennifer Cervantes   MRN: [id number]    Department: [facility]   Date of Visit: 12/23/2020           After Visit Summary Signature Page    I have received my discharge instructions, and my questions have been answered. I have discussed any challenges I see with this plan with the nurse or doctor.    ..........................................................................................................................................  Patient/Patient Representative Signature      ..........................................................................................................................................  Patient Representative Print Name and Relationship to Patient    ..................................................               ................................................  Date                                   Time    ..........................................................................................................................................  Reviewed by Signature/Title    ...................................................              ..............................................  Date                                               Time          22EPIC Rev 08/18

## 2020-12-23 NOTE — LETTER
"    12/23/2020         RE: Jennifer Cervantes  [address]  [facility] [address]      Jennifer Cervantes is a 20 year old female who is being evaluated via a billable video visit.      The patient has been notified of following:     \"This video visit will be conducted via a call between you and your physician/provider. We have found that certain health care needs can be provided without the need for an in-person physical exam.  This service lets us provide the care you need with a video conversation.  If a prescription is necessary we can send it directly to your pharmacy.  If lab work is needed we can place an order for that and you can then stop by our lab to have the test done at a later time.    Video visits are billed at different rates depending on your insurance coverage.  Please reach out to your insurance provider with any questions.    If during the course of the call the physician/provider feels a video visit is not appropriate, you will not be charged for this service.\"    Patient has given verbal consent for Video visit? Yes  How would you like to obtain your AVS? MyChart  If you are dropped from the video visit, the video invite should be resent to: Text to cell phone: [phone]  Will anyone else be joining your video visit? No      Video-Visit Details    Type of service:  Video Visit    Video Start Time: 1:53pm  Video End Time: 2:23pm    Originating Location (pt. Location): Home    Distant Location (provider location):  [facility]     Platform used for Video Visit: RONALDO Bravo    Vitals - Patient Reported  Weight (Patient Reported): 72.6 kg (160 lb)  Height (Patient Reported): 162.6 cm (5' 4\")  BMI (Based on Pt Reported Ht/Wt): 27.46  Pain Score: No Pain (0)    Oncology/Hematology Visit Note  Dec 23, 2020    Reason for Visit: Follow up of sickle cell hgbSS disease     History of Present Illness: HgbSS complicated by frequent pain crises (acute and " chronic components), history of stroke leading to significant cognitive delays and right upper extremity hemiparesis, iron overload 2/2 chronic transfusions as secondary ppx post-CVA, anxiety/depression. Please see Dr. Duncan's detailed note from 2/28/20 for complete hematologic history.    She was called today for hematology follow-up.       Interval History:  Jennifer was called today for hematology follow-up. She overall is feeling well. She is now on one Oxycontin per day and her pain is stable-taking Oxycodone PRN appx every 6 hours along with alternating Tylenol and Ibuprofen. Has Celebrex but doesn't use that often. She mainly gets pain in her back and extremities. The chest pain she had while on Voxeletor is resolved.    She denies fevers, headaches, dizziness, chest pain, SOB, cough, nausea, vomiting, abdominal pain, bowel or bladder concerns, edema, rashes. Her prior GI upset and trouble eating has completely resolved. She denies any concerns with anxiety or depression, stating mood is stable on Sertraline. Things at home are going fine though she would like to find her own place to live at some point.    Current Outpatient Medications   Medication Sig Dispense Refill     acetaminophen (TYLENOL) 325 MG tablet Take 2 tablets (650 mg) by mouth every 6 hours as needed for mild pain 120 tablet 3     albuterol (PROAIR HFA/PROVENTIL HFA/VENTOLIN HFA) 108 (90 Base) MCG/ACT inhaler Inhale 2 puffs into the lungs every 6 hours as needed for shortness of breath / dyspnea or wheezing 8.5 g 3     albuterol (PROVENTIL) (2.5 MG/3ML) 0.083% neb solution Take 1 vial (2.5 mg) by nebulization every 6 hours as needed for shortness of breath / dyspnea or wheezing 12 mL 4     aspirin (ASPIRIN) 81 MG EC tablet Take 1 tablet (81 mg) by mouth daily 90 tablet 3     budesonide-formoterol (SYMBICORT) 160-4.5 MCG/ACT Inhaler Inhale 2 puffs into the lungs 2 times daily 10.2 g 3     celecoxib (CELEBREX) 100 MG capsule Take 1 capsule (100  mg) by mouth 2 times daily 60 capsule 3     diphenhydrAMINE (BENADRYL) 25 MG capsule Take 1-2 capsules (25-50 mg) by mouth nightly as needed for sleep 60 capsule 3     hydroxyurea (HYDREA) 500 MG capsule Take 4 capsules (2,000 mg) by mouth daily 120 capsule 3     Hydroxyurea 1000 MG TABS Take 2,000 mg by mouth daily 60 tablet 3     ibuprofen (ADVIL/MOTRIN) 200 MG tablet Take 3 tablets (600 mg) by mouth every 6 hours as needed for moderate pain 90 tablet 3     JADENU 360 MG tablet Take 4 tablets (1,440 mg) by mouth daily 120 tablet 4     medroxyPROGESTERone (DEPO-PROVERA) 150 MG/ML IM injection Inject 150 mg into the muscle       naloxone (NARCAN) 4 MG/0.1ML nasal spray Spray 1 spray (4 mg) into one nostril alternating nostrils once as needed for opioid reversal every 2-3 minutes until assistance arrives 0.2 mL 1     ondansetron (ZOFRAN) 8 MG tablet        oxyCODONE IR (ROXICODONE) 10 MG tablet Take 1 tablet (10mg) by mouth every 6 hours for pain. If, after 2 doses it is not working, try a dose at 4 hours and call clinic. 60 tablet 0     OXYCONTIN 10 MG 12 hr tablet Take 1 tablet (10 mg) by mouth every 12 hours 60 tablet 0     sertraline (ZOLOFT) 100 MG tablet Take 2 tablets (200 mg) by mouth daily 180 tablet 3     Physical Examination:  There were no vitals taken for this visit.  Wt Readings from Last 10 Encounters:   12/17/20 72.3 kg (159 lb 8 oz)   12/07/20 70.3 kg (155 lb)   12/03/20 71.7 kg (158 lb)   11/17/20 72.1 kg (159 lb)   11/06/20 71.8 kg (158 lb 6.4 oz)   10/14/20 70.6 kg (155 lb 11.2 oz)   10/14/20 70.6 kg (155 lb 11.2 oz)   10/08/20 68.4 kg (150 lb 11.2 oz)   07/20/20 68.9 kg (152 lb)   07/17/20 68.9 kg (152 lb)     Video Physical Exam  General: Patient appears well in no acute distress.   Skin: No visualized rash or lesions on visualized skin  Eyes: EOMI, no erythema, sclera icterus or discharge noted  Resp: Appears to be breathing comfortably without accessory muscle usage, speaking in full  sentences, no cough  MSK: Appears to have normal range of motion based on visualized movements  Neurologic: No apparent tremors, facial movements symmetric  Psych: affect bright, alert and oriented    The rest of a comprehensive physical examination is deferred due to PHE (public health emergency) video restrictions    Laboratory Data:  Results for HIMANSHU AL (MRN [id number]) as of 12/24/2020 11:22   12/17/2020 10:39   Sodium 140   Potassium 3.8   Chloride 112 (H)   Carbon Dioxide 20   Urea Nitrogen 7   Creatinine 0.54   GFR Estimate >90   GFR Estimate If Black >90   Calcium 8.6   Anion Gap 7   Albumin 3.9   Protein Total 7.7   Bilirubin Total 4.4 (H)   Alkaline Phosphatase 70   ALT 89 (H)    (H)   Glucose 89   WBC 10.0   Hemoglobin 7.6 (L)   Hematocrit 20.6 (L)   Platelet Count 271   RBC Count 2.30 (L)   MCV 90   MCH 33.0   MCHC 36.9 (H)   RDW 24.4 (H)   Diff Method Manual Differential   % Neutrophils 63.7   % Lymphocytes 22.1   % Monocytes 8.8   % Eosinophils 2.7   % Basophils 2.7   Nucleated RBCs 10 (H)   Absolute Neutrophil 6.4   Absolute Lymphocytes 2.2   Absolute Monocytes 0.9   Absolute Eosinophils 0.3   Absolute Basophils 0.3 (H)   Absolute Nucleated RBC 1.0   Anisocytosis Marked   Poikilocytosis Marked   Polychromasia Marked   Sickle Cells Marked   Microcytes Present   Macrocytes Present   Platelet Estimate Confirming automated cell count   % Retic 14.6 (H)   Absolute Retic 318.9 (H)       Assessment and Plan:  1. Sickle Cell Disease  HgbSS, currently doing well on our video visit. Prior chest pain resolved with stopping Voxeletor, while continue holding.    Labs: Reviewed recent labs-overall stable. Does have notable anemia and elevated retic which are at her baseline along with hyperbilirubinemia. Recheck labs when she sees Dr. Duncan in January.    Meds: Stopped Vox. Continue ASA for history of stroke. Continue Hydrea 2000mg daily. Contineu Jadenu 4 tablets daily. OK to not be on folic acid  given so many other medications    Pain Control: Continue weaning off OxyContin-will talk to Dr. Duncan about a different long acting pain med per her request. Continue Oxycodone PRN, Tylenol, Ibuprofen, Celebrex PRN. Currently pain controlled during our visit.     Follow-up: Dr. Duncan in January.    Addendum: Spoke with Dr. Duncan and given no change in pain with or without long acting OxyContin no need to start a different long acting at this time and will see how she does with short acting Oxycodone alone.     2. Neuro  History of stroke, no new concerns. Continue ASA    3. Psych  History of anxiety and depression, denies any current concerns. Continue Sertraline. Does well with consistent care-will try to make sure she can follow with myself and Dr. Duncan.    Continue Benadryl PRN for insomnia, working well.    4. Pulm  History of asthma, continue Symbicort (reviewed outside records to concern dose 160-4.5) and Albuterol PRN. No active concerns.     Sickle Cell Health Maintenance    1. Immunizations  -Meningococcal: Up to date  -Pneumonia vaccinations: Up to date  -Annual flu shot: Recommend she get and she will think about    2. Infection Screening  -Hepatitis C screening for high-risk (I.e. multiple transfusions) and/or born between [id number]. Will add to next labs.     3. Eyes  -Annual ophthalmology visit even if prior normal testing. Last eye doctor visit sometime in 2020 per report.     4. Cardiopulmonary  -No recommended EKG, echo, or pulmonary testing routinely however should perform if symptomatic. No current cardiac or respiratory symptoms  -Strongly consider echo and sleep study if symptoms or signs of pulm HTN/sleep apnea (exercise intolerance, fatigue, weight gain, edema, SOB). no current symptoms.      5. Renal  -Annual UA to screen for microalbuminuria/proteinuria. Last UA performed August 2020 and negative.   -If proteinuria (>300g/24 hr) present, refer to nephrology  -If microalbuminuria  (30-300g/24 hr) present, initiate low dose ACE-inhibitor therapy, even with normal BP    6. MSK  -Imaging (XR, MRI) if symptoms raise concerns for AVN. Refer to PT and/or ortho if AVN changes are seen. No concerns.    7. Iron Overload  -10+ transfusions lifetime are at high risk of iron overload  -Annual ferritin monitoring for chronically-transfused patients. Last ferritin checked and >10K. Continue Jadenu and per Dr. Duncan may need to consider adding Desferal-will recheck Ferritin in January  -If signs of iron overload, annual cardiac MRI and LFT monitoring. Will order heart and liver MRI.   -Refer to hepatology if concerns for hepatic dysfunction-monitor LFTs    Andrei Machado PA-C  [facility]  [address] [place], [place] [id number] [phone]

## 2020-12-23 NOTE — Clinical Note
"    12/23/2020         RE: Jennifer Cervantse  [address]  [facility] [address]        Dear Colleague,    Thank you for referring your patient, Jennifer Cervantes, to the [facility]. Please see a copy of my visit note below.    Jennifer Cervantes is a 20 year old female who is being evaluated via a billable video visit.      The patient has been notified of following:     \"This video visit will be conducted via a call between you and your physician/provider. We have found that certain health care needs can be provided without the need for an in-person physical exam.  This service lets us provide the care you need with a video conversation.  If a prescription is necessary we can send it directly to your pharmacy.  If lab work is needed we can place an order for that and you can then stop by our lab to have the test done at a later time.    Video visits are billed at different rates depending on your insurance coverage.  Please reach out to your insurance provider with any questions.    If during the course of the call the physician/provider feels a video visit is not appropriate, you will not be charged for this service.\"    Patient has given verbal consent for Video visit? Yes  How would you like to obtain your AVS? MyChart  If you are dropped from the video visit, the video invite should be resent to: Text to cell phone: [phone]  Will anyone else be joining your video visit? No  {If patient encounters technical issues they should call [phone] :[id number]}    Video-Visit Details    Type of service:  Video Visit    Video Start Time: 1:53pm  Video End Time: 2:23pm    Originating Location (pt. Location): Home    Distant Location (provider location):  [facility]     Platform used for Video Visit: RONALDO Bravo    Vitals - Patient Reported  Weight (Patient Reported): 72.6 kg (160 lb)  Height (Patient Reported): 162.6 cm (5' 4\")  BMI (Based on Pt " Reported Ht/Wt): 27.46  Pain Score: No Pain (0)    Oncology/Hematology Visit Note  Dec 23, 2020    Reason for Visit: Follow up of sickle cell hgbSS disease     History of Present Illness: HgbSS complicated by frequent pain crises (acute and chronic components), history of stroke leading to significant cognitive delays and right upper extremity hemiparesis, iron overload 2/2 chronic transfusions as secondary ppx post-CVA, anxiety/depression. Please see Dr. Duncan's detailed note from 2/28/20 for complete hematologic history.    She was called today for hematology follow-up.       Interval History:  IN PROGRESS    Current Outpatient Medications   Medication Sig Dispense Refill     acetaminophen (TYLENOL) 325 MG tablet Take 2 tablets (650 mg) by mouth every 6 hours as needed for mild pain 120 tablet 3     albuterol (PROVENTIL) (2.5 MG/3ML) 0.083% neb solution Take 1 vial (2.5 mg) by nebulization every 6 hours as needed for shortness of breath / dyspnea or wheezing 12 mL 4     aspirin (ASPIRIN) 81 MG EC tablet Take 1 tablet (81 mg) by mouth daily 30 tablet 4     Budesonide-Formoterol Fumarate (SYMBICORT IN) Inhale  into the lungs.       celecoxib (CELEBREX) 100 MG capsule Take 1 capsule (100 mg) by mouth 2 times daily 60 capsule 3     diphenhydrAMINE (BENADRYL) 25 MG capsule Take 1-2 capsules (25-50 mg) by mouth nightly as needed for sleep 60 capsule 3     Hydroxyurea 1000 MG TABS Take 2,000 mg by mouth daily 60 tablet 3     ibuprofen (ADVIL/MOTRIN) 200 MG tablet Take 3 tablets (600 mg) by mouth every 6 hours as needed for moderate pain 90 tablet 3     JADENU 360 MG tablet Take 4 tablets (1,440 mg) by mouth daily 120 tablet 4     medroxyPROGESTERone (DEPO-PROVERA) 150 MG/ML IM injection Inject 150 mg into the muscle       naloxone (NARCAN) 4 MG/0.1ML nasal spray Spray 1 spray (4 mg) into one nostril alternating nostrils once as needed for opioid reversal every 2-3 minutes until assistance arrives 0.2 mL 1      ondansetron (ZOFRAN) 8 MG tablet        oxyCODONE IR (ROXICODONE) 10 MG tablet Take 1 tablet (10mg) by mouth every 6 hours for pain. If, after 2 doses it is not working, try a dose at 4 hours and call clinic. 60 tablet 0     OXYCONTIN 10 MG 12 hr tablet Take 1 tablet (10 mg) by mouth every 12 hours 60 tablet 0     sertraline (ZOLOFT) 100 MG tablet Take 2 tablets (200 mg) by mouth daily 60 tablet 3     Voxelotor 500 MG TABS Take 1,500 mg by mouth daily 90 tablet 3     Physical Examination:  There were no vitals taken for this visit.  Wt Readings from Last 10 Encounters:   12/17/20 72.3 kg (159 lb 8 oz)   12/07/20 70.3 kg (155 lb)   12/03/20 71.7 kg (158 lb)   11/17/20 72.1 kg (159 lb)   11/06/20 71.8 kg (158 lb 6.4 oz)   10/14/20 70.6 kg (155 lb 11.2 oz)   10/14/20 70.6 kg (155 lb 11.2 oz)   10/08/20 68.4 kg (150 lb 11.2 oz)   07/20/20 68.9 kg (152 lb)   07/17/20 68.9 kg (152 lb)     Constitutional: Well-appearing female in no acute distress.  Eyes: EOMI, PERRL. No scleral icterus.  ENT: Oral mucosa is moist without lesions or thrush.   Lymphatic: Neck is supple without cervical or supraclavicular lymphadenopathy. No axillary lymphadenopathy.  Cardiovascular: Regular rate and rhythm. No murmurs, gallops, or rubs. No peripheral edema.  Respiratory: Clear to auscultation bilaterally. No wheezes or crackles.  Gastrointestinal: Bowel sounds present. Abdomen soft, non-tender. No palpable hepatosplenomegaly or masses.   Neurologic: Cranial nerves II through XII are grossly intact.  Skin: No rashes, petechiae, or bruising noted on exposed skin.    Laboratory Data:        Assessment and Plan:       Sickle Cell Health Maintenance    1. Immunizations  -Meningococcal: Should have been given in pediatrics. Confirmed given/needed  -Pneumonia vaccinations: Up to date/needed  -Annual flu shot: Given/needed    2. Infection Screening  -Hepatitis C screening for high-risk (I.e. multiple transfusions) and/or born between  9788-6658    3. Eyes  -Annual ophthalmology visit even if prior normal testing. Last eye doctor visit ***    4. Cardiopulmonary  -No recommended EKG, echo, or pulmonary testing routinely however should perform if symptomatic. *** current cardiac or respiratory symptoms  -Strongly consider echo and sleep study if symptoms or signs of pulm HTN/sleep apnea (exercise intolerance, fatigue, weight gain, edema, SOB). *** current symptoms.      5. Renal  -Annual UA to screen for microalbuminuria/proteinuria. Last UA performed ***  -If proteinuria (>300g/24 hr) present, refer to nephrology  -If microalbuminuria (30-300g/24 hr) present, initiate low dose ACE-inhibitor therapy, even with normal BP    6. MSK  -Imaging (XR, MRI) if symptoms raise concerns for AVN. Refer to PT and/or ortho if AVN changes are seen    7. Iron Overload  -10+ transfusions lifetime are at high risk of iron overload  -Annual ferritin monitoring for chronically-transfused patients. Last ferritin checked ***  -If signs of iron overload, annual cardiac MRI and LFT monitoring.  -Refer to hepatology if concerns for hepatic dysfunction    Andrei Machado PA-C  [facility]  [address] [place], [place] [address] [phone]        Again, thank you for allowing me to participate in the care of your patient.        Sincerely,        RONALDO Allan

## 2020-12-24 ENCOUNTER — PATIENT OUTREACH (OUTPATIENT)
Dept: ONCOLOGY | Facility: CLINIC | Age: 20
End: 2020-12-24

## 2020-12-24 VITALS
HEIGHT: 64 IN | SYSTOLIC BLOOD PRESSURE: 102 MMHG | DIASTOLIC BLOOD PRESSURE: 71 MMHG | RESPIRATION RATE: 16 BRPM | OXYGEN SATURATION: 95 % | HEART RATE: 90 BPM | BODY MASS INDEX: 27.14 KG/M2 | TEMPERATURE: 98.5 F | WEIGHT: 159 LBS

## 2020-12-24 LAB
ALBUMIN SERPL-MCNC: 3.9 G/DL (ref 3.4–5)
ALP SERPL-CCNC: 68 U/L (ref 40–150)
ALT SERPL W P-5'-P-CCNC: 98 U/L (ref 0–50)
ANION GAP SERPL CALCULATED.3IONS-SCNC: 7 MMOL/L (ref 3–14)
ANISOCYTOSIS BLD QL SMEAR: ABNORMAL
AST SERPL W P-5'-P-CCNC: 120 U/L (ref 0–45)
BASOPHILS # BLD AUTO: 0.3 10E9/L (ref 0–0.2)
BASOPHILS NFR BLD AUTO: 2.8 %
BILIRUB SERPL-MCNC: 4.1 MG/DL (ref 0.2–1.3)
BUN SERPL-MCNC: 13 MG/DL (ref 7–30)
CALCIUM SERPL-MCNC: 8.5 MG/DL (ref 8.5–10.1)
CHLORIDE SERPL-SCNC: 111 MMOL/L (ref 94–109)
CO2 SERPL-SCNC: 22 MMOL/L (ref 20–32)
CREAT SERPL-MCNC: 0.56 MG/DL (ref 0.52–1.04)
DIFFERENTIAL METHOD BLD: ABNORMAL
ELLIPTOCYTES BLD QL SMEAR: ABNORMAL
EOSINOPHIL # BLD AUTO: 0.4 10E9/L (ref 0–0.7)
EOSINOPHIL NFR BLD AUTO: 3.7 %
ERYTHROCYTE [DISTWIDTH] IN BLOOD BY AUTOMATED COUNT: 25 % (ref 10–15)
GFR SERPL CREATININE-BSD FRML MDRD: >90 ML/MIN/{1.73_M2}
GLUCOSE SERPL-MCNC: 89 MG/DL (ref 70–99)
HCT VFR BLD AUTO: 19.5 % (ref 35–47)
HGB BLD-MCNC: 6.9 G/DL (ref 11.7–15.7)
LACTATE BLD-SCNC: 0.7 MMOL/L (ref 0.7–2)
LYMPHOCYTES # BLD AUTO: 2.5 10E9/L (ref 0.8–5.3)
LYMPHOCYTES NFR BLD AUTO: 22.9 %
MCH RBC QN AUTO: 32.5 PG (ref 26.5–33)
MCHC RBC AUTO-ENTMCNC: 35.4 G/DL (ref 31.5–36.5)
MCV RBC AUTO: 92 FL (ref 78–100)
MONOCYTES # BLD AUTO: 0.5 10E9/L (ref 0–1.3)
MONOCYTES NFR BLD AUTO: 4.6 %
NEUTROPHILS # BLD AUTO: 7.3 10E9/L (ref 1.6–8.3)
NEUTROPHILS NFR BLD AUTO: 66 %
NRBC # BLD AUTO: 0.4 10*3/UL
NRBC BLD AUTO-RTO: 4 /100
OVALOCYTES BLD QL SMEAR: SLIGHT
PLATELET # BLD AUTO: 313 10E9/L (ref 150–450)
PLATELET # BLD EST: ABNORMAL 10*3/UL
POIKILOCYTOSIS BLD QL SMEAR: ABNORMAL
POLYCHROMASIA BLD QL SMEAR: SLIGHT
POTASSIUM SERPL-SCNC: 3.6 MMOL/L (ref 3.4–5.3)
PROT SERPL-MCNC: 7.3 G/DL (ref 6.8–8.8)
RBC # BLD AUTO: 2.12 10E12/L (ref 3.8–5.2)
RETICS # AUTO: 408.7 10E9/L (ref 25–95)
RETICS/RBC NFR AUTO: 19.3 % (ref 0.5–2)
SICKLE CELLS BLD QL SMEAR: SLIGHT
SODIUM SERPL-SCNC: 140 MMOL/L (ref 133–144)
TARGETS BLD QL SMEAR: ABNORMAL
WBC # BLD AUTO: 11.1 10E9/L (ref 4–11)

## 2020-12-24 PROCEDURE — 85045 AUTOMATED RETICULOCYTE COUNT: CPT | Performed by: EMERGENCY MEDICINE

## 2020-12-24 PROCEDURE — 80053 COMPREHEN METABOLIC PANEL: CPT | Performed by: EMERGENCY MEDICINE

## 2020-12-24 PROCEDURE — 96375 TX/PRO/DX INJ NEW DRUG ADDON: CPT

## 2020-12-24 PROCEDURE — 250N000011 HC RX IP 250 OP 636: Performed by: EMERGENCY MEDICINE

## 2020-12-24 PROCEDURE — 250N000013 HC RX MED GY IP 250 OP 250 PS 637: Performed by: EMERGENCY MEDICINE

## 2020-12-24 PROCEDURE — 96374 THER/PROPH/DIAG INJ IV PUSH: CPT

## 2020-12-24 PROCEDURE — 258N000001 HC RX 258: Performed by: EMERGENCY MEDICINE

## 2020-12-24 PROCEDURE — 85025 COMPLETE CBC W/AUTO DIFF WBC: CPT | Performed by: EMERGENCY MEDICINE

## 2020-12-24 PROCEDURE — 83605 ASSAY OF LACTIC ACID: CPT | Performed by: EMERGENCY MEDICINE

## 2020-12-24 RX ORDER — KETOROLAC TROMETHAMINE 30 MG/ML
30 INJECTION, SOLUTION INTRAMUSCULAR; INTRAVENOUS ONCE
Status: COMPLETED | OUTPATIENT
Start: 2020-12-24 | End: 2020-12-24

## 2020-12-24 RX ORDER — DIPHENHYDRAMINE HCL 50 MG
50 CAPSULE ORAL
Status: COMPLETED | OUTPATIENT
Start: 2020-12-24 | End: 2020-12-24

## 2020-12-24 RX ORDER — HEPARIN SODIUM,PORCINE 10 UNIT/ML
5-10 VIAL (ML) INTRAVENOUS EVERY 24 HOURS
Status: DISCONTINUED | OUTPATIENT
Start: 2020-12-24 | End: 2020-12-24 | Stop reason: HOSPADM

## 2020-12-24 RX ORDER — ONDANSETRON 2 MG/ML
4 INJECTION INTRAMUSCULAR; INTRAVENOUS EVERY 30 MIN PRN
Status: DISCONTINUED | OUTPATIENT
Start: 2020-12-24 | End: 2020-12-24 | Stop reason: HOSPADM

## 2020-12-24 RX ORDER — HEPARIN SODIUM,PORCINE 10 UNIT/ML
5-10 VIAL (ML) INTRAVENOUS
Status: DISCONTINUED | OUTPATIENT
Start: 2020-12-24 | End: 2020-12-24 | Stop reason: HOSPADM

## 2020-12-24 RX ORDER — MORPHINE SULFATE 2 MG/ML
2 INJECTION, SOLUTION INTRAMUSCULAR; INTRAVENOUS EVERY 30 MIN PRN
Status: COMPLETED | OUTPATIENT
Start: 2020-12-24 | End: 2020-12-24

## 2020-12-24 RX ADMIN — ONDANSETRON 4 MG: 2 INJECTION INTRAMUSCULAR; INTRAVENOUS at 00:36

## 2020-12-24 RX ADMIN — MORPHINE SULFATE 2 MG: 2 INJECTION, SOLUTION INTRAMUSCULAR; INTRAVENOUS at 01:59

## 2020-12-24 RX ADMIN — MORPHINE SULFATE 2 MG: 2 INJECTION, SOLUTION INTRAMUSCULAR; INTRAVENOUS at 04:26

## 2020-12-24 RX ADMIN — Medication 5 ML: at 06:48

## 2020-12-24 RX ADMIN — MORPHINE SULFATE 2 MG: 2 INJECTION, SOLUTION INTRAMUSCULAR; INTRAVENOUS at 00:37

## 2020-12-24 RX ADMIN — DIPHENHYDRAMINE HYDROCHLORIDE 50 MG: 50 CAPSULE ORAL at 00:37

## 2020-12-24 RX ADMIN — DEXTROSE AND SODIUM CHLORIDE: 5; 450 INJECTION, SOLUTION INTRAVENOUS at 00:37

## 2020-12-24 RX ADMIN — KETOROLAC TROMETHAMINE 30 MG: 30 INJECTION, SOLUTION INTRAMUSCULAR; INTRAVENOUS at 00:36

## 2020-12-24 NOTE — DISCHARGE INSTRUCTIONS
Please follow-up with your primary hematologist as soon as possible.  Continue take your regular medications.    Return to the emergency department immediately for any chest pain, back pain, shortness of breath, weakness, abdominal pain nausea, vomiting, or any other concerns as given or discussed
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
## 2020-12-24 NOTE — ED PROVIDER NOTES
ED Provider Note  [facility]      History     Chief Complaint   Patient presents with     Sickle Cell Pain Crisis     The history is provided by the patient and medical records.     Jennifer Cervantes is a 20 year old female with a history of sickle cell disease, hemiplegia and hemiparesis affecting right side 2/2 cerebral infarction, and asthma who presents to the Emergency Department with sickle cell pain crisis. Patient has had multiple ED visits this month including 12/3, 12/7, and 12/10 with sickle cell pain. Patient reports worsening low back pain and upper and lower extremity joint pain. Onset at 1 pm today. Similar to prior sickle cell crises. She tried usual home regimen without improvement including oral opioids and warm packs. No associated fevers, chills, chest pain, shortness of breath, abdominal pain, vomiting, or diarrhea. She reports her last hospitalization was about a year ago.    Past Medical History  Past Medical History:   Diagnosis Date     Anemia      Anxiety      Bleeding disorder (H)      Blood clotting disorder (H)      Cerebral infarction (H) 2015     Cognitive developmental delay     low IQ. 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 (H)     right hand contractures     Iron overload due to repeated red blood cell transfusions      Migraines      Oppositional defiant behavior      Uncomplicated asthma      Past Surgical History:   Procedure Laterality Date     AS INSERT TUNNELED CV 2 CATH W/O PORT/PUMP       C BREAST REDUCTION (INCLUDES LIPO) TIER 3 Bilateral 04/23/2019     IR CVC NON TUNNEL PLACEMENT  4/7/2020     REPAIR TENDON ELBOW Right 10/2/2019    Procedure: Right Elbow Flexor Lengthening, Flexor Pronator Slide Of Wrist and Finger, Thumb Adductor Lengthening;  Surgeon: Anai Franco MD;  [facility]     TONSILLECTOMY  Bilateral 10/2/2019    Procedure: Bilateral Tonsillectomy;  Surgeon: Farhana Guy MD;  [facility] OR          acetaminophen (TYLENOL) 325 MG tablet       albuterol (PROAIR HFA/PROVENTIL HFA/VENTOLIN HFA) 108 (90 Base) MCG/ACT inhaler       albuterol (PROVENTIL) (2.5 MG/3ML) 0.083% neb solution       aspirin (ASPIRIN) 81 MG EC tablet       budesonide-formoterol (SYMBICORT) 160-4.5 MCG/ACT Inhaler       celecoxib (CELEBREX) 100 MG capsule       diphenhydrAMINE (BENADRYL) 25 MG capsule       hydroxyurea (HYDREA) 500 MG capsule       Hydroxyurea 1000 MG TABS       ibuprofen (ADVIL/MOTRIN) 200 MG tablet       JADENU 360 MG tablet       medroxyPROGESTERone (DEPO-PROVERA) 150 MG/ML IM injection       naloxone (NARCAN) 4 MG/0.1ML nasal spray       ondansetron (ZOFRAN) 8 MG tablet       oxyCODONE IR (ROXICODONE) 10 MG tablet       OXYCONTIN 10 MG 12 hr tablet       sertraline (ZOLOFT) 100 MG tablet      Allergies   Allergen Reactions     Fish-Derived Products Hives     Seafood Hives     Contrast Dye      Diagnostic X-Ray Materials      Gadolinium      Family History  Family History   Problem Relation Age of Onset     Sickle Cell Trait Mother      Sickle Cell Trait Father      Social History   Social History     Tobacco Use     Smoking status: Never Smoker     Smokeless tobacco: Never Used   Substance Use Topics     Alcohol use: Not Currently     Alcohol/week: 0.0 standard drinks     Drug use: Never      Past medical history, past surgical history, medications, allergies, family history, and social history were reviewed with the patient. No additional pertinent items.       Review of Systems    10 point review of symptoms was performed and is negative except as noted above.   A complete review of systems was performed with pertinent positives and negatives noted in the HPI, and all other systems negative.    Physical Exam   BP: (!) 146/75  Pulse: 114  Temp: 98.5  F (36.9  C)  Resp: 18  Height: 162.6 cm (5'  "4\")  Weight: 72.1 kg (159 lb)  SpO2: 90 %  Physical Exam  GEN: Well appearing, non toxic, cooperative and conversant.   HEENT: The head is normocephalic and atraumatic. Pupils are equal round and reactive to light. Extraocular motions are intact. There is no facial swelling. The neck is nontender and supple.   CV: Regular rate and rhythm. 2+ radial pulses bilaterally.  PULM: Clear to auscultation bilaterally.  ABD: Soft, nontender, nondistended.   EXT:.  No edema.  NEURO: Cranial nerves II through XII are intact and symmetric.     PSYCH: Calm and cooperative, interactive.     ED Course      Procedures                         Results for orders placed or performed during the hospital encounter of 12/23/20   CBC with platelets differential     Status: Abnormal   Result Value Ref Range    WBC 11.1 (H) 4.0 - 11.0 10e9/L    RBC Count 2.12 (L) 3.8 - 5.2 10e12/L    Hemoglobin 6.9 (LL) 11.7 - 15.7 g/dL    Hematocrit 19.5 (L) 35.0 - 47.0 %    MCV 92 78 - 100 fl    MCH 32.5 26.5 - 33.0 pg    MCHC 35.4 31.5 - 36.5 g/dL    RDW 25.0 (H) 10.0 - 15.0 %    Platelet Count 313 150 - 450 10e9/L    Diff Method Manual Differential     % Neutrophils 66.0 %    % Lymphocytes 22.9 %    % Monocytes 4.6 %    % Eosinophils 3.7 %    % Basophils 2.8 %    Nucleated RBCs 4 (H) 0 /100    Absolute Neutrophil 7.3 1.6 - 8.3 10e9/L    Absolute Lymphocytes 2.5 0.8 - 5.3 10e9/L    Absolute Monocytes 0.5 0.0 - 1.3 10e9/L    Absolute Eosinophils 0.4 0.0 - 0.7 10e9/L    Absolute Basophils 0.3 (H) 0.0 - 0.2 10e9/L    Absolute Nucleated RBC 0.4     Anisocytosis Marked     Poikilocytosis Marked     Polychromasia Slight     Sickle Cells Slight     Ovalocytes Slight     Elliptocytes Marked     Target Cells Moderate     Platelet Estimate Confirming automated cell count    Comprehensive metabolic panel     Status: Abnormal   Result Value Ref Range    Sodium 140 133 - 144 mmol/L    Potassium 3.6 3.4 - 5.3 mmol/L    Chloride 111 (H) 94 - 109 mmol/L    Carbon Dioxide " 22 20 - 32 mmol/L    Anion Gap 7 3 - 14 mmol/L    Glucose 89 70 - 99 mg/dL    Urea Nitrogen 13 7 - 30 mg/dL    Creatinine 0.56 0.52 - 1.04 mg/dL    GFR Estimate >90 >60 mL/min/[1.73_m2]    GFR Estimate If Black >90 >60 mL/min/[1.73_m2]    Calcium 8.5 8.5 - 10.1 mg/dL    Bilirubin Total 4.1 (H) 0.2 - 1.3 mg/dL    Albumin 3.9 3.4 - 5.0 g/dL    Protein Total 7.3 6.8 - 8.8 g/dL    Alkaline Phosphatase 68 40 - 150 U/L    ALT 98 (H) 0 - 50 U/L     (H) 0 - 45 U/L   Lactic acid whole blood     Status: None   Result Value Ref Range    Lactic Acid 0.7 0.7 - 2.0 mmol/L   Reticulocyte count     Status: Abnormal   Result Value Ref Range    % Retic 19.3 (H) 0.5 - 2.0 %    Absolute Retic 408.7 (H) 25 - 95 10e9/L     Medications   ondansetron (ZOFRAN) injection 4 mg (4 mg Intravenous Given 12/24/20 0036)   dextrose 5% and 0.45% NaCl infusion ( Intravenous Stopped 12/24/20 0426)   morphine (PF) injection 2 mg (2 mg Intravenous Given 12/24/20 0426)   ketorolac (TORADOL) injection 30 mg (30 mg Intravenous Given 12/24/20 0036)   diphenhydrAMINE (BENADRYL) capsule 50 mg (50 mg Oral Given 12/24/20 0037)        Assessments & Plan (with Medical Decision Making)   20-year-old female with history of sickle cell disease with multiple sequelae presenting with what she feels like is her typical sickle cell crisis including low back pain and joint pain.  DDx also includes acute chest, aplastic crisis, septic joint, bacteremia, febrile illness, myalgias, among other causes.    clinically patient is nontoxic appearing.  IV placed, labs sent and IV hydration initiated as well as her analgesia protocol as per her care plan.    Labs otherwise supportive of sickle cell crisis with elevated reticulocyte count, lactate olayinka, hemoglobin 6.9, near the patient's baseline.      After her third dose the patient did significantly improve and was asking and ready to go home.  - Patient agrees to our plan and is ready and eager for discharge. Care plan,  follow up plan, and reasons to return immediately to the ED were dicussed in detail and summarized as noted in the discharge instructions.      I have reviewed the nursing notes. I have reviewed the findings, diagnosis, plan and need for follow up with the patient.    New Prescriptions    No medications on file       Final diagnoses:   Sickle cell pain crisis (H)     I, Sisi Alejandra, am serving as a trained medical scribe to document services personally performed by Deo Schmid MD, based on the provider's statements to me.      IDeo MD, was physically present and have reviewed and verified the accuracy of this note documented by Sisi Alejandra.     --  Deo Schmid MD  [facility] EMERGENCY DEPARTMENT  12/23/2020     Deo Schmid MD  12/24/20 0636

## 2020-12-24 NOTE — ED TRIAGE NOTES
Pt presents to ED with c/o sickle cell crisis. Pt states that around noon today she began experiencing back pain and bilateral arm and leg pain. Pt states this is how she typically feels when she is in a crisis.

## 2020-12-28 ENCOUNTER — TELEPHONE (OUTPATIENT)
Dept: ONCOLOGY | Facility: CLINIC | Age: 20
End: 2020-12-28

## 2020-12-28 NOTE — TELEPHONE ENCOUNTER
12/28/20    Called patient to follow-up on question about long acting narcotic. No answer. LM that we do not want to start long acting pain meds at this time and see how she does with the short acting Oxycodone plus Ibuprofen/Tylenol for now given no difference with the OxyContin and risk of side effects/other issues with adding another narcotic. Also let her know we need cardiac and liver MRI sometime in the future. Instructed her to call Julieta or triage with any questions and discuss this further with Dr. Duncan at her next visit.    Andrei Machado PA-C

## 2020-12-29 ENCOUNTER — HOSPITAL ENCOUNTER (EMERGENCY)
Facility: CLINIC | Age: 20
Discharge: HOME | End: 2020-12-30
Attending: EMERGENCY MEDICINE | Admitting: EMERGENCY MEDICINE
Payer: COMMERCIAL

## 2020-12-29 ENCOUNTER — TELEPHONE (OUTPATIENT)
Dept: ONCOLOGY | Facility: CLINIC | Age: 20
End: 2020-12-29

## 2020-12-29 DIAGNOSIS — D57.00 SICKLE CELL PAIN CRISIS (H): ICD-10-CM

## 2020-12-29 LAB
ALBUMIN SERPL-MCNC: 4.1 G/DL (ref 3.4–5)
ALP SERPL-CCNC: 75 U/L (ref 40–150)
ALT SERPL W P-5'-P-CCNC: 122 U/L (ref 0–50)
ANION GAP SERPL CALCULATED.3IONS-SCNC: 9 MMOL/L (ref 3–14)
ANISOCYTOSIS BLD QL SMEAR: ABNORMAL
AST SERPL W P-5'-P-CCNC: 135 U/L (ref 0–45)
BASOPHILS # BLD AUTO: 0.4 10E9/L (ref 0–0.2)
BASOPHILS NFR BLD AUTO: 3 %
BILIRUB SERPL-MCNC: 3.4 MG/DL (ref 0.2–1.3)
BUN SERPL-MCNC: 8 MG/DL (ref 7–30)
CALCIUM SERPL-MCNC: 8.8 MG/DL (ref 8.5–10.1)
CHLORIDE SERPL-SCNC: 107 MMOL/L (ref 94–109)
CO2 SERPL-SCNC: 22 MMOL/L (ref 20–32)
CREAT SERPL-MCNC: 0.57 MG/DL (ref 0.52–1.04)
DIFFERENTIAL METHOD BLD: ABNORMAL
EOSINOPHIL # BLD AUTO: 1.3 10E9/L (ref 0–0.7)
EOSINOPHIL NFR BLD AUTO: 10 %
ERYTHROCYTE [DISTWIDTH] IN BLOOD BY AUTOMATED COUNT: 25.4 % (ref 10–15)
GFR SERPL CREATININE-BSD FRML MDRD: >90 ML/MIN/{1.73_M2}
GLUCOSE SERPL-MCNC: 93 MG/DL (ref 70–99)
HCT VFR BLD AUTO: 21.3 % (ref 35–47)
HGB BLD-MCNC: 7.5 G/DL (ref 11.7–15.7)
LYMPHOCYTES # BLD AUTO: 2.2 10E9/L (ref 0.8–5.3)
LYMPHOCYTES NFR BLD AUTO: 17 %
MACROCYTES BLD QL SMEAR: PRESENT
MCH RBC QN AUTO: 32.8 PG (ref 26.5–33)
MCHC RBC AUTO-ENTMCNC: 35.2 G/DL (ref 31.5–36.5)
MCV RBC AUTO: 93 FL (ref 78–100)
MICROCYTES BLD QL SMEAR: PRESENT
MONOCYTES # BLD AUTO: 1.2 10E9/L (ref 0–1.3)
MONOCYTES NFR BLD AUTO: 9 %
NEUTROPHILS # BLD AUTO: 8 10E9/L (ref 1.6–8.3)
NEUTROPHILS NFR BLD AUTO: 61 %
NRBC # BLD AUTO: 0.1 10*3/UL
NRBC BLD AUTO-RTO: 1 /100
PLATELET # BLD AUTO: 263 10E9/L (ref 150–450)
POIKILOCYTOSIS BLD QL SMEAR: ABNORMAL
POLYCHROMASIA BLD QL SMEAR: ABNORMAL
POTASSIUM SERPL-SCNC: 3.7 MMOL/L (ref 3.4–5.3)
PROT SERPL-MCNC: 8.2 G/DL (ref 6.8–8.8)
RBC # BLD AUTO: 2.29 10E12/L (ref 3.8–5.2)
RETICS # AUTO: 435.1 10E9/L (ref 25–95)
RETICS/RBC NFR AUTO: 19 % (ref 0.5–2)
SICKLE CELLS BLD QL SMEAR: ABNORMAL
SODIUM SERPL-SCNC: 138 MMOL/L (ref 133–144)
TARGETS BLD QL SMEAR: ABNORMAL
WBC # BLD AUTO: 13.1 10E9/L (ref 4–11)

## 2020-12-29 PROCEDURE — 85045 AUTOMATED RETICULOCYTE COUNT: CPT | Performed by: EMERGENCY MEDICINE

## 2020-12-29 PROCEDURE — 96376 TX/PRO/DX INJ SAME DRUG ADON: CPT

## 2020-12-29 PROCEDURE — 99285 EMERGENCY DEPT VISIT HI MDM: CPT | Performed by: EMERGENCY MEDICINE

## 2020-12-29 PROCEDURE — 96375 TX/PRO/DX INJ NEW DRUG ADDON: CPT

## 2020-12-29 PROCEDURE — 85025 COMPLETE CBC W/AUTO DIFF WBC: CPT | Performed by: EMERGENCY MEDICINE

## 2020-12-29 PROCEDURE — 258N000003 HC RX IP 258 OP 636: Performed by: EMERGENCY MEDICINE

## 2020-12-29 PROCEDURE — 36415 COLL VENOUS BLD VENIPUNCTURE: CPT | Performed by: EMERGENCY MEDICINE

## 2020-12-29 PROCEDURE — 96361 HYDRATE IV INFUSION ADD-ON: CPT

## 2020-12-29 PROCEDURE — 96374 THER/PROPH/DIAG INJ IV PUSH: CPT

## 2020-12-29 PROCEDURE — 80053 COMPREHEN METABOLIC PANEL: CPT | Performed by: EMERGENCY MEDICINE

## 2020-12-29 PROCEDURE — 250N000011 HC RX IP 250 OP 636: Performed by: EMERGENCY MEDICINE

## 2020-12-29 PROCEDURE — 99285 EMERGENCY DEPT VISIT HI MDM: CPT | Mod: 25

## 2020-12-29 RX ORDER — SODIUM CHLORIDE, SODIUM LACTATE, POTASSIUM CHLORIDE, CALCIUM CHLORIDE 600; 310; 30; 20 MG/100ML; MG/100ML; MG/100ML; MG/100ML
1000 INJECTION, SOLUTION INTRAVENOUS CONTINUOUS
Status: DISCONTINUED | OUTPATIENT
Start: 2020-12-29 | End: 2020-12-30 | Stop reason: HOSPADM

## 2020-12-29 RX ORDER — KETOROLAC TROMETHAMINE 15 MG/ML
15 INJECTION, SOLUTION INTRAMUSCULAR; INTRAVENOUS ONCE
Status: COMPLETED | OUTPATIENT
Start: 2020-12-29 | End: 2020-12-29

## 2020-12-29 RX ORDER — MORPHINE SULFATE 2 MG/ML
2 INJECTION, SOLUTION INTRAMUSCULAR; INTRAVENOUS
Status: COMPLETED | OUTPATIENT
Start: 2020-12-29 | End: 2020-12-29

## 2020-12-29 RX ADMIN — MORPHINE SULFATE 2 MG: 2 INJECTION, SOLUTION INTRAMUSCULAR; INTRAVENOUS at 22:21

## 2020-12-29 RX ADMIN — KETOROLAC TROMETHAMINE 15 MG: 15 INJECTION, SOLUTION INTRAMUSCULAR; INTRAVENOUS at 21:19

## 2020-12-29 RX ADMIN — SODIUM CHLORIDE, POTASSIUM CHLORIDE, SODIUM LACTATE AND CALCIUM CHLORIDE 1000 ML: 600; 310; 30; 20 INJECTION, SOLUTION INTRAVENOUS at 21:24

## 2020-12-29 RX ADMIN — MORPHINE SULFATE 2 MG: 2 INJECTION, SOLUTION INTRAMUSCULAR; INTRAVENOUS at 21:20

## 2020-12-29 RX ADMIN — MORPHINE SULFATE 2 MG: 2 INJECTION, SOLUTION INTRAMUSCULAR; INTRAVENOUS at 23:22

## 2020-12-29 ASSESSMENT — MIFFLIN-ST. JEOR: SCORE: 1487.1

## 2020-12-29 NOTE — ED AVS SNAPSHOT
[facility] Emergency Department  [address] ([place]) [address]  Phone: [phone]                                    Jennifer Cervantes   MRN: [id number]    Department: [facility] Emergency [facility]   Date of Visit: 12/29/2020           After Visit Summary Signature Page    I have received my discharge instructions, and my questions have been answered. I have discussed any challenges I see with this plan with the nurse or doctor.    ..........................................................................................................................................  Patient/Patient Representative Signature      ..........................................................................................................................................  Patient Representative Print Name and Relationship to Patient    ..................................................               ................................................  Date                                   Time    ..........................................................................................................................................  Reviewed by Signature/Title    ...................................................              ..............................................  Date                                               Time          22EPIC Rev 08/18

## 2020-12-29 NOTE — TELEPHONE ENCOUNTER
[facility] Telephone Triage Note    The following symptoms were reported:     Description:            Onset:  Last night   Location: bilateral sides and lower back (she states this is typical sickle cell type pain for her).  Character: Sharp           Intensity: 8.5/10    Accompanying Signs & Symptoms:  none    Chest Pain:  Denies     Shortness of Breath:  Denies     Fever:  Denies     Chills:  Denies   Cough/sore throat:  Denies  Other:  No known COVID-19 contacts    Therapies Tried and outcome: Home medications, states she does not need refills at this time.    Improved by: nothing    The following provider was consulted:  Patient meets protocol.       Patient instructions and/or follow up:  Informed patient her name would be added to list for IVF/pain meds and she would be contacted if/when an appointment becomes available. Advised patient if her pain is severe/uncontrolled to present to ED. She verbalizes understanding.

## 2020-12-30 VITALS
SYSTOLIC BLOOD PRESSURE: 141 MMHG | BODY MASS INDEX: 27.74 KG/M2 | TEMPERATURE: 98.1 F | HEIGHT: 64 IN | DIASTOLIC BLOOD PRESSURE: 76 MMHG | HEART RATE: 100 BPM | OXYGEN SATURATION: 90 % | WEIGHT: 162.5 LBS | RESPIRATION RATE: 18 BRPM

## 2020-12-30 PROCEDURE — 250N000011 HC RX IP 250 OP 636: Performed by: EMERGENCY MEDICINE

## 2020-12-30 RX ORDER — HEPARIN SODIUM (PORCINE) LOCK FLUSH IV SOLN 100 UNIT/ML 100 UNIT/ML
5 SOLUTION INTRAVENOUS
Status: DISCONTINUED | OUTPATIENT
Start: 2020-12-30 | End: 2020-12-30 | Stop reason: HOSPADM

## 2020-12-30 RX ADMIN — HEPARIN 5 ML: 100 SYRINGE at 00:27

## 2020-12-30 NOTE — ED PROVIDER NOTES
[place] EMERGENCY DEPARTMENT ([facility])  12/29/20    History     Chief Complaint   Patient presents with     Sickle Cell Pain Crisis     HPI  Jennifer Cervantes is a 20 year old female with a history of sickle cell disease, hemiplegia and hemiparesis affecting the right side 2/2 cerebral infarction, and asthma who presents to the Emergency Department with sickle cell pain crisis.  Patient states that over the past days she has been doing everything that she can to control her sickle cell pain crisis with no significant improvement of her symptoms.  Her crisis is the same as always as she has discomfort premenstrual all of her body but more focused towards her back.  She otherwise denies fever, shortness of breath, chest pain, abdominal discomfort or dysuria.    Past Medical History  Past Medical History:   Diagnosis Date     Anemia      Anxiety      Bleeding disorder (H)      Blood clotting disorder (H)      Cerebral infarction (H) 2015     Cognitive developmental delay     low IQ. 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 (H)     right hand contractures     Iron overload due to repeated red blood cell transfusions      Migraines      Oppositional defiant behavior      Uncomplicated asthma      Past Surgical History:   Procedure Laterality Date     AS INSERT TUNNELED CV 2 CATH W/O PORT/PUMP       C BREAST REDUCTION (INCLUDES LIPO) TIER 3 Bilateral 04/23/2019     IR CVC NON TUNNEL PLACEMENT  4/7/2020     REPAIR TENDON ELBOW Right 10/2/2019    Procedure: Right Elbow Flexor Lengthening, Flexor Pronator Slide Of Wrist and Finger, Thumb Adductor Lengthening;  Surgeon: Anai Franco MD;  [facility] OR     TONSILLECTOMY Bilateral 10/2/2019    Procedure: Bilateral Tonsillectomy;  Surgeon: Farhana Guy MD;  [facility]: [facility] OR          acetaminophen (TYLENOL) 325  "MG tablet       aspirin (ASPIRIN) 81 MG EC tablet       celecoxib (CELEBREX) 100 MG capsule       diphenhydrAMINE (BENADRYL) 25 MG capsule       Hydroxyurea 1000 MG TABS       ibuprofen (ADVIL/MOTRIN) 200 MG tablet       JADENU 360 MG tablet       ondansetron (ZOFRAN) 8 MG tablet       oxyCODONE IR (ROXICODONE) 10 MG tablet       sertraline (ZOLOFT) 100 MG tablet       albuterol (PROAIR HFA/PROVENTIL HFA/VENTOLIN HFA) 108 (90 Base) MCG/ACT inhaler       albuterol (PROVENTIL) (2.5 MG/3ML) 0.083% neb solution       budesonide-formoterol (SYMBICORT) 160-4.5 MCG/ACT Inhaler       hydroxyurea (HYDREA) 500 MG capsule       medroxyPROGESTERone (DEPO-PROVERA) 150 MG/ML IM injection       naloxone (NARCAN) 4 MG/0.1ML nasal spray      Allergies   Allergen Reactions     Fish-Derived Products Hives     Seafood Hives     Contrast Dye      Diagnostic X-Ray Materials      Gadolinium      Family History  Family History   Problem Relation Age of Onset     Sickle Cell Trait Mother      Sickle Cell Trait Father      Social History   Social History     Tobacco Use     Smoking status: Never Smoker     Smokeless tobacco: Never Used   Substance Use Topics     Alcohol use: Not Currently     Alcohol/week: 0.0 standard drinks     Drug use: Never      Past medical history, past surgical history, medications, allergies, family history, and social history were reviewed with the patient. No additional pertinent items.       Review of Systems  A complete review of systems was performed with pertinent positives and negatives noted in the HPI, and all other systems negative.    Physical Exam   BP: (!) 144/66  Pulse: 122  Temp: 98.1  F (36.7  C)  Resp: 18  Height: 162.6 cm (5' 4\")  Weight: 73.7 kg (162 lb 8 oz)  SpO2: 95 %  Physical Exam  Vitals signs and nursing note reviewed.   Constitutional:       General: She is not in acute distress.     Appearance: She is well-developed. She is not ill-appearing, toxic-appearing or diaphoretic.      Comments: " Comfortably resting, lying in bed, NAD, nondiaphoretic, lucid, fully conversant, no  respiratory distress, alert and oriented.     HENT:      Head: Normocephalic and atraumatic.      Mouth/Throat:      Pharynx: No oropharyngeal exudate.   Eyes:      General: No scleral icterus.     Extraocular Movements: Extraocular movements intact.      Pupils: Pupils are equal, round, and reactive to light.   Neck:      Musculoskeletal: Normal range of motion and neck supple.   Cardiovascular:      Rate and Rhythm: Normal rate.   Pulmonary:      Effort: No respiratory distress.      Breath sounds: Normal breath sounds.   Abdominal:      Palpations: Abdomen is soft.      Tenderness: There is no abdominal tenderness.   Musculoskeletal:         General: No tenderness.      Right lower leg: No edema.      Left lower leg: No edema.   Skin:     General: Skin is warm and dry.      Coloration: Skin is not jaundiced or pale.      Findings: No erythema or rash.   Neurological:      Mental Status: She is alert and oriented to person, place, and time.           ED Course      Procedures                         Results for orders placed or performed during the hospital encounter of 12/29/20   Comprehensive metabolic panel     Status: Abnormal   Result Value Ref Range    Sodium 138 133 - 144 mmol/L    Potassium 3.7 3.4 - 5.3 mmol/L    Chloride 107 94 - 109 mmol/L    Carbon Dioxide 22 20 - 32 mmol/L    Anion Gap 9 3 - 14 mmol/L    Glucose 93 70 - 99 mg/dL    Urea Nitrogen 8 7 - 30 mg/dL    Creatinine 0.57 0.52 - 1.04 mg/dL    GFR Estimate >90 >60 mL/min/[1.73_m2]    GFR Estimate If Black >90 >60 mL/min/[1.73_m2]    Calcium 8.8 8.5 - 10.1 mg/dL    Bilirubin Total 3.4 (H) 0.2 - 1.3 mg/dL    Albumin 4.1 3.4 - 5.0 g/dL    Protein Total 8.2 6.8 - 8.8 g/dL    Alkaline Phosphatase 75 40 - 150 U/L     (H) 0 - 50 U/L     (H) 0 - 45 U/L   Reticulocyte count     Status: Abnormal   Result Value Ref Range    % Retic 19.0 (H) 0.5 - 2.0 %     Absolute Retic 435.1 (H) 25 - 95 10e9/L   CBC with platelets differential     Status: Abnormal   Result Value Ref Range    WBC 13.1 (H) 4.0 - 11.0 10e9/L    RBC Count 2.29 (L) 3.8 - 5.2 10e12/L    Hemoglobin 7.5 (L) 11.7 - 15.7 g/dL    Hematocrit 21.3 (L) 35.0 - 47.0 %    MCV 93 78 - 100 fl    MCH 32.8 26.5 - 33.0 pg    MCHC 35.2 31.5 - 36.5 g/dL    RDW 25.4 (H) 10.0 - 15.0 %    Platelet Count 263 150 - 450 10e9/L    Diff Method Manual Differential     % Neutrophils 61.0 %    % Lymphocytes 17.0 %    % Monocytes 9.0 %    % Eosinophils 10.0 %    % Basophils 3.0 %    Nucleated RBCs 1 (H) 0 /100    Absolute Neutrophil 8.0 1.6 - 8.3 10e9/L    Absolute Lymphocytes 2.2 0.8 - 5.3 10e9/L    Absolute Monocytes 1.2 0.0 - 1.3 10e9/L    Absolute Eosinophils 1.3 (H) 0.0 - 0.7 10e9/L    Absolute Basophils 0.4 (H) 0.0 - 0.2 10e9/L    Absolute Nucleated RBC 0.1     Anisocytosis Moderate     Poikilocytosis Marked     Polychromasia Marked     Sickle Cells Marked     Target Cells Moderate     Microcytes Present     Macrocytes Present      Medications   lactated ringers BOLUS 1,000 mL (0 mLs Intravenous Stopped 12/29/20 2322)   morphine (PF) injection 2 mg (2 mg Intravenous Given 12/29/20 2322)   ketorolac (TORADOL) injection 15 mg (15 mg Intravenous Given 12/29/20 2119)        Assessments & Plan (with Medical Decision Making)   This is a 20-year-old female patient with a significant past medical history of sickle cell who is presenting to the emergency room today with her typical pain crisis which is total body but also focused in her back.  She believes that her symptoms are exacerbated by the cold weather.  She otherwise has a normal physical exam and vitals are otherwise stable.  She was provided with her normal care plan which included Toradol, morphine and IV fluids and on reassessment the patient states that she is feeling significantly better and would like to be discharged.  At this time all of her labs are reviewed which are  mostly unchanged from her baseline.  Patient will follow up with her hematology clinic for further care management.  Pt was discharged home/self-care.  PT was provided written discharge instructions. Additionally verbal instructions were given and discussed with patient.  PT was asked to return to the ED immediately for any new or concerning symptoms.  Pt was in agreement, endorsed understanding, and questions were answered.  I have reviewed the nursing notes. I have reviewed the findings, diagnosis, plan and need for follow up with the patient.    Discharge Medication List as of 12/30/2020 12:05 AM          Final diagnoses:   Sickle cell pain crisis (H)       --  Reddy Contreras MD  [facility] EMERGENCY DEPARTMENT  12/29/2020     Reddy Contreras MD  12/30/20 8470

## 2020-12-30 NOTE — ED TRIAGE NOTES
"20 year old female with history of sickle cell diease, presents with concerns of sickle cell \"flare up\" over the past several days.  Patient has tried her prescribed home medication with any relief of generalized body pain.   "

## 2021-01-04 ENCOUNTER — TELEPHONE (OUTPATIENT)
Dept: ONCOLOGY | Facility: CLINIC | Age: 21
End: 2021-01-04

## 2021-01-04 ENCOUNTER — HOSPITAL ENCOUNTER (EMERGENCY)
Facility: CLINIC | Age: 21
Discharge: HOME | End: 2021-01-04
Attending: EMERGENCY MEDICINE | Admitting: EMERGENCY MEDICINE
Payer: COMMERCIAL

## 2021-01-04 VITALS
TEMPERATURE: 98.4 F | HEART RATE: 101 BPM | SYSTOLIC BLOOD PRESSURE: 107 MMHG | DIASTOLIC BLOOD PRESSURE: 66 MMHG | OXYGEN SATURATION: 93 % | BODY MASS INDEX: 26.46 KG/M2 | RESPIRATION RATE: 18 BRPM | HEIGHT: 64 IN | WEIGHT: 155 LBS

## 2021-01-04 DIAGNOSIS — D57.00 SICKLE CELL PAIN CRISIS (H): ICD-10-CM

## 2021-01-04 LAB
ACANTHOCYTES BLD QL SMEAR: SLIGHT
ALBUMIN SERPL-MCNC: 4.2 G/DL (ref 3.4–5)
ALP SERPL-CCNC: 82 U/L (ref 40–150)
ALT SERPL W P-5'-P-CCNC: 133 U/L (ref 0–50)
ANION GAP SERPL CALCULATED.3IONS-SCNC: 8 MMOL/L (ref 3–14)
ANISOCYTOSIS BLD QL SMEAR: ABNORMAL
AST SERPL W P-5'-P-CCNC: 160 U/L (ref 0–45)
BASOPHILS # BLD AUTO: 0.2 10E9/L (ref 0–0.2)
BASOPHILS NFR BLD AUTO: 1.8 %
BILIRUB SERPL-MCNC: 5.1 MG/DL (ref 0.2–1.3)
BUN SERPL-MCNC: 11 MG/DL (ref 7–30)
CALCIUM SERPL-MCNC: 9 MG/DL (ref 8.5–10.1)
CHLORIDE SERPL-SCNC: 108 MMOL/L (ref 94–109)
CO2 SERPL-SCNC: 23 MMOL/L (ref 20–32)
CREAT SERPL-MCNC: 0.68 MG/DL (ref 0.52–1.04)
DIFFERENTIAL METHOD BLD: ABNORMAL
ELLIPTOCYTES BLD QL SMEAR: SLIGHT
EOSINOPHIL # BLD AUTO: 0.4 10E9/L (ref 0–0.7)
EOSINOPHIL NFR BLD AUTO: 3.5 %
ERYTHROCYTE [DISTWIDTH] IN BLOOD BY AUTOMATED COUNT: 24.1 % (ref 10–15)
GFR SERPL CREATININE-BSD FRML MDRD: >90 ML/MIN/{1.73_M2}
GLUCOSE SERPL-MCNC: 81 MG/DL (ref 70–99)
HCT VFR BLD AUTO: 20.9 % (ref 35–47)
HGB BLD-MCNC: 7.3 G/DL (ref 11.7–15.7)
HOWELL-JOLLY BOD BLD QL SMEAR: PRESENT
LYMPHOCYTES # BLD AUTO: 1.9 10E9/L (ref 0.8–5.3)
LYMPHOCYTES NFR BLD AUTO: 15.9 %
MACROCYTES BLD QL SMEAR: PRESENT
MCH RBC QN AUTO: 32.9 PG (ref 26.5–33)
MCHC RBC AUTO-ENTMCNC: 34.9 G/DL (ref 31.5–36.5)
MCV RBC AUTO: 94 FL (ref 78–100)
MICROCYTES BLD QL SMEAR: PRESENT
MONOCYTES # BLD AUTO: 1 10E9/L (ref 0–1.3)
MONOCYTES NFR BLD AUTO: 8.8 %
NEUTROPHILS # BLD AUTO: 8.3 10E9/L (ref 1.6–8.3)
NEUTROPHILS NFR BLD AUTO: 70 %
NRBC # BLD AUTO: 1.5 10*3/UL
NRBC BLD AUTO-RTO: 12 /100
PLATELET # BLD AUTO: 263 10E9/L (ref 150–450)
PLATELET # BLD EST: ABNORMAL 10*3/UL
POIKILOCYTOSIS BLD QL SMEAR: ABNORMAL
POLYCHROMASIA BLD QL SMEAR: ABNORMAL
POTASSIUM SERPL-SCNC: 3.9 MMOL/L (ref 3.4–5.3)
PROT SERPL-MCNC: 8.2 G/DL (ref 6.8–8.8)
RBC # BLD AUTO: 2.22 10E12/L (ref 3.8–5.2)
RETICS # AUTO: 444 10E9/L (ref 25–95)
RETICS/RBC NFR AUTO: 20 % (ref 0.5–2)
SICKLE CELLS BLD QL SMEAR: ABNORMAL
SODIUM SERPL-SCNC: 138 MMOL/L (ref 133–144)
TARGETS BLD QL SMEAR: SLIGHT
WBC # BLD AUTO: 11.9 10E9/L (ref 4–11)

## 2021-01-04 PROCEDURE — 99285 EMERGENCY DEPT VISIT HI MDM: CPT | Performed by: EMERGENCY MEDICINE

## 2021-01-04 PROCEDURE — 85025 COMPLETE CBC W/AUTO DIFF WBC: CPT | Performed by: EMERGENCY MEDICINE

## 2021-01-04 PROCEDURE — 250N000011 HC RX IP 250 OP 636: Performed by: EMERGENCY MEDICINE

## 2021-01-04 PROCEDURE — 258N000003 HC RX IP 258 OP 636: Performed by: EMERGENCY MEDICINE

## 2021-01-04 PROCEDURE — 85045 AUTOMATED RETICULOCYTE COUNT: CPT | Performed by: EMERGENCY MEDICINE

## 2021-01-04 PROCEDURE — 96361 HYDRATE IV INFUSION ADD-ON: CPT | Performed by: EMERGENCY MEDICINE

## 2021-01-04 PROCEDURE — 96375 TX/PRO/DX INJ NEW DRUG ADDON: CPT | Performed by: EMERGENCY MEDICINE

## 2021-01-04 PROCEDURE — 99285 EMERGENCY DEPT VISIT HI MDM: CPT | Mod: 25 | Performed by: EMERGENCY MEDICINE

## 2021-01-04 PROCEDURE — 80053 COMPREHEN METABOLIC PANEL: CPT | Performed by: EMERGENCY MEDICINE

## 2021-01-04 PROCEDURE — 96374 THER/PROPH/DIAG INJ IV PUSH: CPT | Performed by: EMERGENCY MEDICINE

## 2021-01-04 PROCEDURE — 96376 TX/PRO/DX INJ SAME DRUG ADON: CPT | Performed by: EMERGENCY MEDICINE

## 2021-01-04 RX ORDER — MORPHINE SULFATE 4 MG/ML
2 INJECTION, SOLUTION INTRAMUSCULAR; INTRAVENOUS
Status: COMPLETED | OUTPATIENT
Start: 2021-01-04 | End: 2021-01-04

## 2021-01-04 RX ORDER — KETOROLAC TROMETHAMINE 30 MG/ML
30 INJECTION, SOLUTION INTRAMUSCULAR; INTRAVENOUS ONCE
Status: COMPLETED | OUTPATIENT
Start: 2021-01-04 | End: 2021-01-04

## 2021-01-04 RX ORDER — HEPARIN SODIUM (PORCINE) LOCK FLUSH IV SOLN 100 UNIT/ML 100 UNIT/ML
500 SOLUTION INTRAVENOUS ONCE
Status: COMPLETED | OUTPATIENT
Start: 2021-01-04 | End: 2021-01-04

## 2021-01-04 RX ADMIN — KETOROLAC TROMETHAMINE 30 MG: 30 INJECTION, SOLUTION INTRAMUSCULAR at 18:31

## 2021-01-04 RX ADMIN — Medication 500 UNITS: at 22:34

## 2021-01-04 RX ADMIN — SODIUM CHLORIDE, POTASSIUM CHLORIDE, SODIUM LACTATE AND CALCIUM CHLORIDE 1000 ML: 600; 310; 30; 20 INJECTION, SOLUTION INTRAVENOUS at 18:31

## 2021-01-04 RX ADMIN — MORPHINE SULFATE 2 MG: 4 INJECTION INTRAVENOUS at 18:32

## 2021-01-04 RX ADMIN — MORPHINE SULFATE 2 MG: 4 INJECTION INTRAVENOUS at 21:43

## 2021-01-04 RX ADMIN — MORPHINE SULFATE 2 MG: 4 INJECTION INTRAVENOUS at 20:22

## 2021-01-04 ASSESSMENT — ENCOUNTER SYMPTOMS
VOMITING: 0
DIARRHEA: 0
ACTIVITY CHANGE: 0
COUGH: 0
SHORTNESS OF BREATH: 0
NAUSEA: 0
DYSURIA: 0
ABDOMINAL PAIN: 0
BLOOD IN STOOL: 0
FEVER: 0
MYALGIAS: 1
APPETITE CHANGE: 0

## 2021-01-04 ASSESSMENT — MIFFLIN-ST. JEOR: SCORE: 1453.08

## 2021-01-04 NOTE — ED PROVIDER NOTES
"ED Provider Note  [facility]      History     Chief Complaint   Patient presents with     Sickle Cell Pain Crisis     The history is provided by the patient and medical records.     Jennifer Cervantes is a 20 year old female with a past medical history significant for sickle cell disease, asthma, migraines, oppositional defiant behavior, anxiety and depression who presents to the ED for evaluation of a sickle cell pain crisis.  The patient states that she normally has pain in her back when she is in a sickle cell pain crisis.  Yesterday morning, the patient reports having an onset of pain in her arms and has progressively worsened and now states that her pain is \"all over.\"  She denies any fevers, nausea, vomiting, abdominal pain, diarrhea, blood in her stool, dysuria, change in activity, change in appetite, chest pain, cough or shortness of breath.  She denies any sick contacts.  She is on depo and does not have regular periods.        Past Medical History  Past Medical History:   Diagnosis Date     Anemia      Anxiety      Bleeding disorder (H)      Blood clotting disorder (H)      Cerebral infarction (H) 2015     Cognitive developmental delay     low IQ. 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 (H)     right hand contractures     Iron overload due to repeated red blood cell transfusions      Migraines      Oppositional defiant behavior      Uncomplicated asthma      Past Surgical History:   Procedure Laterality Date     AS INSERT TUNNELED CV 2 CATH W/O PORT/PUMP       C BREAST REDUCTION (INCLUDES LIPO) TIER 3 Bilateral 04/23/2019     IR CVC NON TUNNEL PLACEMENT  4/7/2020     REPAIR TENDON ELBOW Right 10/2/2019    Procedure: Right Elbow Flexor Lengthening, Flexor Pronator Slide Of Wrist and Finger, Thumb Adductor Lengthening;  Surgeon: Anai Franco, " MD;  Location: [facility] OR     TONSILLECTOMY Bilateral 10/2/2019    Procedure: Bilateral Tonsillectomy;  Surgeon: Farhana Guy MD;  Location: [facility] OR          acetaminophen (TYLENOL) 325 MG tablet       albuterol (PROAIR HFA/PROVENTIL HFA/VENTOLIN HFA) 108 (90 Base) MCG/ACT inhaler       albuterol (PROVENTIL) (2.5 MG/3ML) 0.083% neb solution       aspirin (ASPIRIN) 81 MG EC tablet       budesonide-formoterol (SYMBICORT) 160-4.5 MCG/ACT Inhaler       celecoxib (CELEBREX) 100 MG capsule       diphenhydrAMINE (BENADRYL) 25 MG capsule       hydroxyurea (HYDREA) 500 MG capsule       Hydroxyurea 1000 MG TABS       ibuprofen (ADVIL/MOTRIN) 200 MG tablet       JADENU 360 MG tablet       medroxyPROGESTERone (DEPO-PROVERA) 150 MG/ML IM injection       naloxone (NARCAN) 4 MG/0.1ML nasal spray       ondansetron (ZOFRAN) 8 MG tablet       oxyCODONE IR (ROXICODONE) 10 MG tablet       sertraline (ZOLOFT) 100 MG tablet      Allergies   Allergen Reactions     Fish-Derived Products Hives     Seafood Hives     Contrast Dye      Diagnostic X-Ray Materials      Gadolinium      Family History  Family History   Problem Relation Age of Onset     Sickle Cell Trait Mother      Sickle Cell Trait Father      Social History   Social History     Tobacco Use     Smoking status: Never Smoker     Smokeless tobacco: Never Used   Substance Use Topics     Alcohol use: Not Currently     Alcohol/week: 0.0 standard drinks     Drug use: Never      Past medical history, past surgical history, medications, allergies, family history, and social history were reviewed with the patient. No additional pertinent items.       Review of Systems   Constitutional: Negative for activity change, appetite change and fever.   Respiratory: Negative for cough and shortness of breath.    Cardiovascular: Negative for chest pain.   Gastrointestinal: Negative for abdominal pain, blood in stool, diarrhea, nausea and vomiting.   Genitourinary: Negative for dysuria.  "  Musculoskeletal: Positive for myalgias.   All other systems reviewed and are negative.    A complete review of systems was performed with pertinent positives and negatives noted in the HPI, and all other systems negative.    Physical Exam   BP: 127/82  Pulse: 119  Temp: 98.4  F (36.9  C)  Resp: 18  Height: 162.6 cm (5' 4\")  Weight: 70.3 kg (155 lb)  SpO2: 92 %  Physical Exam  General: patient is alert and oriented and in no acute distress   Head: atraumatic and normocephalic   EENT: moist mucus membranes, sclera anicteric  Neck: supple with full range of motion  Cardiovascular: regular rate and rhythm, no murmur appreciated, extremities warm and well perfused, no lower extremity edema  Pulmonary: lungs clear to auscultation bilaterally   Abdomen: soft, non-tender   Musculoskeletal: normal range of motion   Neurological: alert and oriented, moving all extremities symmetrically, gait normal   Skin: warm, dry     ED Course      Procedures  6:27 PM  The patient was seen and examined by Melissa Larson MD in Room ED27.                    Results for orders placed or performed during the hospital encounter of 01/04/21   CBC with platelets differential     Status: Abnormal (In process)   Result Value Ref Range    WBC 11.9 (H) 4.0 - 11.0 10e9/L    RBC Count 2.22 (L) 3.8 - 5.2 10e12/L    Hemoglobin 7.3 (L) 11.7 - 15.7 g/dL    Hematocrit 20.9 (L) 35.0 - 47.0 %    MCV 94 78 - 100 fl    MCH 32.9 26.5 - 33.0 pg    MCHC 34.9 31.5 - 36.5 g/dL    RDW 24.1 (H) 10.0 - 15.0 %    Platelet Count 263 150 - 450 10e9/L    Diff Method PENDING      Medications - No data to display     Assessments & Plan (with Medical Decision Making)   Ms. Cervantes is a 20-year-old female with a history of sickle cell disease, hemiplegia and hemiparesis following a cerebral infarct who presents to the ED with generalized myalgias.  She is mildly tachycardic but otherwise hemodynamically stable, afebrile and in no respiratory distress.  She denies any signs of or " symptoms of acute chest syndrome.  She denies any ill contacts, cough, shortness of breath or other Covid symptoms.  She reports she has been eating and drinking well without any vomiting or diarrhea.  She denies any urinary symptoms.  Her labs today show anemia at baseline at 7.3 with an appropriately elevated reticulocyte count.  She does have elevation in her LFTs, similar to previous with elevated bilirubin consistent with sickle cell crisis.  She denies any abdominal pain and does not have any tenderness on palpation of the abdomen.  She was treated according to her care plan for pain crises as she reports is very similar to prior episodes.  On reevaluation she is feeling improved.  Will plan to discharge to home with hematology follow-up and close return precautions and patient voiced understanding.    This part of the medical record was transcribed by Len Fernandes, Medical Scribe, from a dictation done by Melissa Nguyen MD.     I have reviewed the nursing notes. I have reviewed the findings, diagnosis, plan and need for follow up with the patient.    New Prescriptions    No medications on file       Final diagnoses:   Sickle cell pain crisis (H)       --  I, Len Fernandes, am serving as a trained medical scribe to document services personally performed by Melissa Nguyen MD, based on the provider's statements to me.     I, Melissa Nguyen MD, was physically present and have reviewed and verified the accuracy of this note documented by Len Fernandes.    Melissa Nguyen MD  [facility] EMERGENCY DEPARTMENT  1/4/2021     Melissa Nguyen MD  01/04/21 2933

## 2021-01-04 NOTE — TELEPHONE ENCOUNTER
"[facility] Telephone Triage Note    The following symptoms were reported:     Description:            Onset:  Yesterday Brett 1/3/2021   Location: \"everywhere\"  Character: Sharp           Intensity: 8.5/10    Accompanying Signs & Symptoms:    Chest Pain:  none     Shortness of Breath:  none     Fever:  No thermometer at home, does not feel warm     Chills:  none   Cough/sore throat:  none  Other: denies other symptoms/concerns    Therapies Tried and outcome: Oxycodone 1 tablet taken in last 24hours.  Ibuprofen & Tylenol, heat packs, warm bath, warm blanket    Improved by: Just a little by interventions above    The following provider was consulted:  Julieta DIAZCC per care plan.     The following advice/orders were given, and/or interventions recommended:  This writer educated to take oxycodone every 6 hours to better manage pain as ordered by provider. Also educated to go to ED if pain still uncontrolled.     Patient instructions and/or follow up: Jennifer verbalized understanding.      0836: Transferred call to Julieta RNCC  "

## 2021-01-04 NOTE — ED TRIAGE NOTES
"Pt presents ambulatory to triage with c/o sickle cell pain crisis. Pt stated she normally has pain in her back when she is in a crisis, but yesterday the pain started in her arms and has been progressively worsening and she now has pain \"all over.\" Denies any other symptoms.  Arely Lee RN on 1/4/2021 at 5:09 PM    "

## 2021-01-04 NOTE — ED AVS SNAPSHOT
[facility]  [address] [address]  Phone: [phone]                                    Jennifer Cervantes   MRN: [id number]    Department: [facility]   Date of Visit: 1/4/2021           After Visit Summary Signature Page    I have received my discharge instructions, and my questions have been answered. I have discussed any challenges I see with this plan with the nurse or doctor.    ..........................................................................................................................................  Patient/Patient Representative Signature      ..........................................................................................................................................  Patient Representative Print Name and Relationship to Patient    ..................................................               ................................................  Date                                   Time    ..........................................................................................................................................  Reviewed by Signature/Title    ...................................................              ..............................................  Date                                               Time          22EPIC Rev 08/18

## 2021-01-04 NOTE — TELEPHONE ENCOUNTER
1500: Patient calls into triage stating she will be presenting to [facility] ED this afternoon for sickle cell pain. Report called to ED.

## 2021-01-05 ENCOUNTER — THERAPY VISIT (OUTPATIENT)
Dept: OCCUPATIONAL THERAPY | Facility: CLINIC | Age: 21
End: 2021-01-05
Payer: COMMERCIAL

## 2021-01-05 DIAGNOSIS — M25.621 STIFFNESS OF ELBOW JOINT, RIGHT: ICD-10-CM

## 2021-01-05 DIAGNOSIS — D57.1 HB-SS DISEASE WITHOUT CRISIS (H): ICD-10-CM

## 2021-01-05 DIAGNOSIS — D57.00 SICKLE CELL CRISIS (H): ICD-10-CM

## 2021-01-05 PROCEDURE — 97763 ORTHC/PROSTC MGMT SBSQ ENC: CPT | Mod: GO | Performed by: OCCUPATIONAL THERAPIST

## 2021-01-05 PROCEDURE — 97110 THERAPEUTIC EXERCISES: CPT | Mod: GO | Performed by: OCCUPATIONAL THERAPIST

## 2021-01-05 RX ORDER — OXYCODONE HYDROCHLORIDE 10 MG/1
TABLET ORAL
Qty: 60 TABLET | Refills: 0 | Status: SHIPPED | OUTPATIENT
Start: 2021-01-05 | End: 2021-01-18

## 2021-01-05 NOTE — TELEPHONE ENCOUNTER
Refill Request    Date of most recent appointment:  12/23  Next upcoming appointment:   1/15    Medication requested:  Oxycodone 10 mg  Quantity:  60 tabs  Last fill date:  12/21  Person requesting refill:  Patient  Notes:  Unable to check , not a listed delegate for provider.     Prescribing provider(s):  Dr. Duncan and Andrei TAI

## 2021-01-05 NOTE — DISCHARGE INSTRUCTIONS
Please make an appointment to follow up with Hematology Oncology Clinic (phone: [phone]) in 2-3 days.    If you have worsening symptoms, fevers, intractable vomiting, chest pain or other concerns, return to the emergency department for re-evaluation.

## 2021-01-05 NOTE — TELEPHONE ENCOUNTER
Pt called back to check on status of oxy, stated she came to the pharmacy and was told nothing was sent in. Informed her Rx has not been signed yet, does she have enough to last tonight, she stated she has enough until tomorrow morning, she is ok going home as long as it will be signed by tomorrow.    Stated she was also told her refills from December were put back because they had been on hold over 10 days, she wants them refilled again. Informed her she has to call the pharmacy to ask for a refill, she stated the pharmacy as very busy so she left but would like all meds ready for  tomorrow. Writer informed her will follow-up with care team to have oxy and refills from 12/23 filled again for pu tomorrow.

## 2021-01-05 NOTE — PROGRESS NOTES
SOAP note objective information for 1/5/2021.  Diagnosis: Right Elbow Flexor Lengthening, Flexor Pronator Slide Of Wrist and Finger, Thumb Adductor Lengthening    DOS: 10/02/19  Procedure:  Right Elbow Flexor Lengthening, Flexor Pronator Slide Of Wrist and Finger, Thumb Adductor Lengthening     Objective:  ROM  Pain Report: - none  + mild    ++ moderate    +++ severe   Elbow 10/24/2019 8/20/20 12/8/20 1/5/21   AROM (PROM) R      Extension -46 -46 (-20) -36 (-25)   Flexion 95  136    Supination + 0 0*    Pronation 75 80 80    *do see movement at elbow  ROM  Pain Report: - none  + mild    ++ moderate    +++ severe   Wrist 10/24/2019 8/20/20 12/8/20 1/5/21   AROM (PROM) right      Extension (-19) -90 (-53) -103 (-47) (-40)   Please refer to the daily flowsheet for treatment today, total treatment time and time spent performing 1:1 timed codes.         Home Exercise Program:  Wrist orthosis at neutral during day  Resting hand at night  Long arm-emphasizing extension at night  Passive range of motion for wrist extension, while maintaining finger functional position

## 2021-01-07 ENCOUNTER — PATIENT OUTREACH (OUTPATIENT)
Dept: ONCOLOGY | Facility: CLINIC | Age: 21
End: 2021-01-07

## 2021-01-07 ENCOUNTER — INFUSION THERAPY VISIT (OUTPATIENT)
Dept: ONCOLOGY | Facility: CLINIC | Age: 21
End: 2021-01-07
Attending: PEDIATRICS
Payer: COMMERCIAL

## 2021-01-07 ENCOUNTER — PATIENT OUTREACH (OUTPATIENT)
Dept: CARE COORDINATION | Facility: CLINIC | Age: 21
End: 2021-01-07

## 2021-01-07 ENCOUNTER — TELEPHONE (OUTPATIENT)
Dept: ONCOLOGY | Facility: CLINIC | Age: 21
End: 2021-01-07

## 2021-01-07 VITALS
HEART RATE: 98 BPM | DIASTOLIC BLOOD PRESSURE: 85 MMHG | RESPIRATION RATE: 18 BRPM | SYSTOLIC BLOOD PRESSURE: 130 MMHG | TEMPERATURE: 98.2 F | OXYGEN SATURATION: 91 %

## 2021-01-07 DIAGNOSIS — J45.909 ASTHMA, UNSPECIFIED ASTHMA SEVERITY, UNSPECIFIED WHETHER COMPLICATED, UNSPECIFIED WHETHER PERSISTENT: ICD-10-CM

## 2021-01-07 DIAGNOSIS — M54.9 CHRONIC BACK PAIN, UNSPECIFIED BACK LOCATION, UNSPECIFIED BACK PAIN LATERALITY: ICD-10-CM

## 2021-01-07 DIAGNOSIS — G89.29 CHRONIC BACK PAIN, UNSPECIFIED BACK LOCATION, UNSPECIFIED BACK PAIN LATERALITY: ICD-10-CM

## 2021-01-07 DIAGNOSIS — D57.1 HB-SS DISEASE WITHOUT CRISIS (H): Primary | ICD-10-CM

## 2021-01-07 DIAGNOSIS — D57.00 SICKLE CELL PAIN CRISIS (H): Primary | ICD-10-CM

## 2021-01-07 PROCEDURE — 86905 BLOOD TYPING RBC ANTIGENS: CPT | Performed by: PEDIATRICS

## 2021-01-07 PROCEDURE — 96374 THER/PROPH/DIAG INJ IV PUSH: CPT

## 2021-01-07 PROCEDURE — 258N000003 HC RX IP 258 OP 636: Performed by: PHYSICIAN ASSISTANT

## 2021-01-07 PROCEDURE — 86901 BLOOD TYPING SEROLOGIC RH(D): CPT | Performed by: PEDIATRICS

## 2021-01-07 PROCEDURE — 250N000011 HC RX IP 250 OP 636: Performed by: PEDIATRICS

## 2021-01-07 PROCEDURE — 86923 COMPATIBILITY TEST ELECTRIC: CPT | Performed by: PEDIATRICS

## 2021-01-07 PROCEDURE — 250N000011 HC RX IP 250 OP 636: Performed by: PHYSICIAN ASSISTANT

## 2021-01-07 PROCEDURE — 96361 HYDRATE IV INFUSION ADD-ON: CPT

## 2021-01-07 PROCEDURE — 86902 BLOOD TYPE ANTIGEN DONOR EA: CPT | Performed by: PEDIATRICS

## 2021-01-07 PROCEDURE — 86850 RBC ANTIBODY SCREEN: CPT | Performed by: PEDIATRICS

## 2021-01-07 PROCEDURE — 96376 TX/PRO/DX INJ SAME DRUG ADON: CPT

## 2021-01-07 PROCEDURE — 86900 BLOOD TYPING SEROLOGIC ABO: CPT | Performed by: PEDIATRICS

## 2021-01-07 RX ORDER — HEPARIN SODIUM (PORCINE) LOCK FLUSH IV SOLN 100 UNIT/ML 100 UNIT/ML
5 SOLUTION INTRAVENOUS
Status: CANCELLED | OUTPATIENT
Start: 2021-01-07

## 2021-01-07 RX ORDER — HEPARIN SODIUM,PORCINE 10 UNIT/ML
5 VIAL (ML) INTRAVENOUS
Status: DISCONTINUED | OUTPATIENT
Start: 2021-01-07 | End: 2021-01-07 | Stop reason: HOSPADM

## 2021-01-07 RX ORDER — HEPARIN SODIUM,PORCINE 10 UNIT/ML
5 VIAL (ML) INTRAVENOUS
Status: CANCELLED | OUTPATIENT
Start: 2021-01-07

## 2021-01-07 RX ORDER — ONDANSETRON 8 MG/1
8 TABLET, FILM COATED ORAL
Status: CANCELLED
Start: 2021-01-07

## 2021-01-07 RX ORDER — MORPHINE SULFATE 2 MG/ML
2 INJECTION, SOLUTION INTRAMUSCULAR; INTRAVENOUS
Status: DISCONTINUED | OUTPATIENT
Start: 2021-01-07 | End: 2021-01-07 | Stop reason: HOSPADM

## 2021-01-07 RX ORDER — MORPHINE SULFATE 2 MG/ML
2 INJECTION, SOLUTION INTRAMUSCULAR; INTRAVENOUS
Status: CANCELLED
Start: 2021-01-07

## 2021-01-07 RX ORDER — DIPHENHYDRAMINE HCL 25 MG
25 CAPSULE ORAL
Status: CANCELLED
Start: 2021-01-07

## 2021-01-07 RX ADMIN — MORPHINE SULFATE 2 MG: 2 INJECTION, SOLUTION INTRAMUSCULAR; INTRAVENOUS at 14:50

## 2021-01-07 RX ADMIN — Medication 5 ML: at 17:13

## 2021-01-07 RX ADMIN — DEXTROSE AND SODIUM CHLORIDE 500 ML: 5; 450 INJECTION, SOLUTION INTRAVENOUS at 14:37

## 2021-01-07 RX ADMIN — MORPHINE SULFATE 2 MG: 2 INJECTION, SOLUTION INTRAMUSCULAR; INTRAVENOUS at 16:56

## 2021-01-07 RX ADMIN — MORPHINE SULFATE 2 MG: 2 INJECTION, SOLUTION INTRAMUSCULAR; INTRAVENOUS at 15:57

## 2021-01-07 ASSESSMENT — PAIN SCALES - GENERAL: PAINLEVEL: EXTREME PAIN (8)

## 2021-01-07 NOTE — PROGRESS NOTES
"Infusion Nursing Note:  Jennifer Cervantes presents today for IV fluids and pain medication.    Patient seen by provider today: No   present during visit today: Not Applicable.    Note: Patient arrives reports \"8-8.5\"/10 low back pain. Took Oxycodone, Ibuprofen and Tylenol without relief this morning. Denies chest pain, SOB, or flu-like symptoms. VSS, although satting in the low 90's.    Pain goal: 5/10.    Patient to receive 2 units of PRBCs tomorrow per karen DIAZ on behalf of Dr. Duncan. T/C drawn today during visit. Consent valid. Pt aware of plan.    Intravenous Access:  Implanted Port.    Treatment Conditions:  Lab Results   Component Value Date    HGB 7.3 01/04/2021     Lab Results   Component Value Date    WBC 11.9 01/04/2021      Lab Results   Component Value Date    ANEU 8.3 01/04/2021     Lab Results   Component Value Date     01/04/2021      Lab Results   Component Value Date     01/04/2021                   Lab Results   Component Value Date    POTASSIUM 3.9 01/04/2021           Lab Results   Component Value Date    MAG 1.4 12/10/2020            Lab Results   Component Value Date    CR 0.68 01/04/2021                   Lab Results   Component Value Date    MICAH 9.0 01/04/2021                Lab Results   Component Value Date    BILITOTAL 5.1 01/04/2021           Lab Results   Component Value Date    ALBUMIN 4.2 01/04/2021                    Lab Results   Component Value Date     01/04/2021           Lab Results   Component Value Date     01/04/2021       Results reviewed, labs MET treatment parameters, ok to proceed with treatment.      Post Infusion Assessment:  Patient tolerated infusion without incident.  Blood return noted pre and post infusion.  Site patent and intact, free from redness, edema or discomfort.  No evidence of extravasations.  Okay to leave port accessed for infusion tomorrow morning per IZA Armando/Allison Orozco, RN @1715 1/7/21.    Upon " "discharge, patient reported pain 5/10 and states she is feeling \"much better\".    Discharge Plan:   Patient declined prescription refills.  Discharge instructions reviewed with: Patient.  Patient and/or family verbalized understanding of discharge instructions and all questions answered.  AVS to patient via [employer].  Patient will return 1/8/21 for 2 units PRBCs.  Patient discharged in stable condition accompanied by: self.  Departure Mode: Ambulatory.    Allison Orozco RN                                                  "

## 2021-01-07 NOTE — PROGRESS NOTES
Writer discussed Jennifer's concerns with Dr Duncan, ok to enter referrals for pulmonary clinic to follow-up on asthma related issues and management of that piece of her care as well as a referral for acupuncture for additional pain management. Dr Duncan is also ok with having Jennifer come in for transfusion of PRBC's per previously entered conditional blood orders if feeling symptomatic. Jennifer is coming in for IVF/pain meds today and will have type/cross done at that time. [facility] there is room for transfusion in [facility] tomorrow and they will provide Jennifer with time when she comes in today. Writer reviewed everything with Jennifer by return phone call and she voiced understanding of plan.

## 2021-01-07 NOTE — TELEPHONE ENCOUNTER
[facility] Telephone Triage Note    The following symptoms were reported:     Description:            Onset:  Yesterday   Location: Back  Character: Sharp and Stabbing           Intensity: 8.5/10    Accompanying Signs & Symptoms:  none    Chest Pain:  Denies     Shortness of Breath:  Denies     Fever:  Denies     Chills:  Denies   Cough/sore throat:  Denies  Other:  No Known COVID-19 contacts    Therapies Tried and outcome: home medications (oxycodone 10 mg, Tylenol and Ibuprofen)    Improved by: nothing    The following provider was consulted:  Cornelia Krishnamurthy    The following advice/orders were given, and/or interventions recommended:  Per Dr. Duncan on behalf of RNCC, sam for IVF/pain meds today. Does not need labs.    Patient instructions and/or follow up:  Scheduled in infusion today at 1400, she states she needs transportation set up. Paged .

## 2021-01-07 NOTE — PROGRESS NOTES
Writer placed return call to Jennifer to discuss concerns/questions she has. Jennifer has several things she would like to propose with regards to her care:    1) She does not like taking pills and is wondering if she can do regular, scheduled desferal infusions vs taking Jadenu daily.    > Writer reviewed that she would likely need to talk to Dr Duncan at her next scheduled visit on 1/15/21 regarding the request for IV iron chelation vs taking oral method but would loop him in on the request.    2) She has tried acupuncture on one occasion in the past and is wondering if she can try it again to assist in pain management.    > She is ok to incorporate alternative methods in pain management and advised we could place referral for our acupuncture center.    3) Per Jennifer, ER provider suggested she be on home O2 for low sats.    > In reviewing chart, it does not appear that Jennifer has seen pulmonology or other to assist with her asthma. Discussed with her that if her asthma is not controlled with use of home inhalers, which she has not had access to recently due to not picking them up from pharmacy, she may need to be seen by pulmonology as a specialty to weigh in. We would not prescribe home O2 without having her see a specialist for this piece of her care.    4) Jennifer feels she is due for PRBC transfusion as her eyes are very yellow and this has typically signaled the need in the past per Jennifer.    > Writer advised Jennifer that there would be concerns about PRBC transfusion for yellowing of her eyes as this could be attributed to her liver not functioning as well or other causes and she may need to be seen in person in clinic to assess. She already has iron overload and transfusions could potentially make this worse.    All concerns/questions addressed and advised Jennifer that I would route encounter to Dr Duncan to weigh in. Jennifer voiced understanding of all that was discussed.

## 2021-01-07 NOTE — PROGRESS NOTES
Social Work Follow-Up Encounter Visit  Oncology Clinic    Data/Intervention:  Patient Name:  Jennifer Cervantes DOB/Age:  2000 (20 year old)    Reason for Follow-Up: Transportation needs    Collaborated With:    -Patient  -[facility]  -      Assessment:  Coordinated ride through patient's insurance transportation services per infusion clinic referral. Communicated ride information to patient and confirmed with infusion clinic transportation arranged for today's appointment.      Plan:  Previously provided patient/family with writer's contact information and availability.   Patient will follow up with their transportation service for their return ride.  will remain available as needed.     Corry GONZALEZ, NATALIA  - Oncology  Phone : [phone]  Pager: [phone]

## 2021-01-08 ENCOUNTER — INFUSION THERAPY VISIT (OUTPATIENT)
Dept: ONCOLOGY | Facility: CLINIC | Age: 21
End: 2021-01-08
Attending: PEDIATRICS
Payer: COMMERCIAL

## 2021-01-08 VITALS
DIASTOLIC BLOOD PRESSURE: 76 MMHG | SYSTOLIC BLOOD PRESSURE: 129 MMHG | TEMPERATURE: 98.8 F | HEART RATE: 95 BPM | OXYGEN SATURATION: 91 % | RESPIRATION RATE: 18 BRPM

## 2021-01-08 DIAGNOSIS — Z86.73 HISTORY OF STROKE: ICD-10-CM

## 2021-01-08 DIAGNOSIS — D57.1 HB-SS DISEASE WITHOUT CRISIS (H): Primary | ICD-10-CM

## 2021-01-08 LAB
ABO + RH BLD: NORMAL
ABO + RH BLD: NORMAL
BLD GP AB SCN SERPL QL: NORMAL
BLD PROD TYP BPU: NORMAL
BLD UNIT ID BPU: 0
BLD UNIT ID BPU: 0
BLOOD BANK CMNT PATIENT-IMP: NORMAL
BLOOD BANK CMNT PATIENT-IMP: NORMAL
BLOOD PRODUCT CODE: NORMAL
BLOOD PRODUCT CODE: NORMAL
BPU ID: NORMAL
BPU ID: NORMAL
NUM BPU REQUESTED: 2
SPECIMEN EXP DATE BLD: NORMAL
TRANSFUSION STATUS PATIENT QL: NORMAL

## 2021-01-08 PROCEDURE — 250N000011 HC RX IP 250 OP 636: Performed by: PEDIATRICS

## 2021-01-08 PROCEDURE — P9016 RBC LEUKOCYTES REDUCED: HCPCS | Performed by: PEDIATRICS

## 2021-01-08 PROCEDURE — 36430 TRANSFUSION BLD/BLD COMPNT: CPT

## 2021-01-08 RX ORDER — HEPARIN SODIUM (PORCINE) LOCK FLUSH IV SOLN 100 UNIT/ML 100 UNIT/ML
5 SOLUTION INTRAVENOUS
Status: DISCONTINUED | OUTPATIENT
Start: 2021-01-08 | End: 2021-01-08 | Stop reason: HOSPADM

## 2021-01-08 RX ORDER — HEPARIN SODIUM,PORCINE 10 UNIT/ML
5 VIAL (ML) INTRAVENOUS
Status: DISCONTINUED | OUTPATIENT
Start: 2021-01-08 | End: 2021-01-08 | Stop reason: HOSPADM

## 2021-01-08 RX ADMIN — Medication 5 ML: at 11:55

## 2021-01-08 ASSESSMENT — PAIN SCALES - GENERAL: PAINLEVEL: EXTREME PAIN (8)

## 2021-01-08 NOTE — PROGRESS NOTES
Infusion Nursing Note:  Jennifer Cervantes presents today for 2 units PRBC.    Patient seen by provider today: No   present during visit today: Not Applicable.    Note: Patient arrives today with pain 8/10 in her back. She received pain medications and IVF yesterday which brought her pain down to a 5/10 but now it has increased back up. Pt took her oral pain medications this morning. Oxygen 89-90% on room air. Pt denies any chest pain, shortness of breath, fatigue, or dizziness.     Oxygen level increased to 95% after 10 minutes of blood transfusion.    Patient rated her pain 7/10 after the 2 units of PRBC. Pt stated she felt comfortable going home and denied wanting her provider paged for any further interventions.     Intravenous Access:  Implanted Port.    Treatment Conditions:  Lab Results   Component Value Date    HGB 7.3 01/04/2021     Lab Results   Component Value Date    WBC 11.9 01/04/2021      Lab Results   Component Value Date    ANEU 8.3 01/04/2021     Lab Results   Component Value Date     01/04/2021      Results reviewed, labs MET treatment parameters, ok to proceed with treatment.  Blood transfusion consent signed 5/22/20.      Post Infusion Assessment:  Patient tolerated infusion without incident.  Blood return noted pre and post infusion.  Site patent and intact, free from redness, edema or discomfort.  No evidence of extravasations.  Access discontinued per protocol.       Discharge Plan:   Patient declined prescription refills.  Discharge instructions reviewed with: Patient.  Patient and/or family verbalized understanding of discharge instructions and all questions answered.  Copy of AVS reviewed with patient and/or family.  Patient will return 1/15/21 for next appointment.  Patient discharged in stable condition accompanied by: self.  Departure Mode: Ambulatory.    Dee Dee Rincon RN

## 2021-01-08 NOTE — PATIENT INSTRUCTIONS
Contact Numbers:   [facility] Main Line: [phone]    Call triage to speak with triage if you are experiencing chills and/or temperature greater than or equal to 100.5, uncontrolled nausea/vomiting, diarrhea, constipation, dizziness, shortness of breath, chest pain, bleeding, unexplained bruising, or any new/concerning symptoms, questions/concerns.     If you are having any concerning symptoms or wish to speak to a provider before your next infusion visit, please call your care coordinator or triage to notify them so we can adequately serve you.     If you need a refill on a medication or narcotic prescription, please call triage or your care coordinator before your infusion appointment.                 January 2021 Sunday Monday Tuesday Wednesday Thursday Friday Saturday                            1     2       3     4    Admission   6:17 PM   Melissa Larson MD   [facility] Emergency Department   (Discharge: 1/4/2021) 5    HAND FOLLOW UP   3:30 PM   (60 min.)   Franchesca Tucker OT   [facility] 6     7    [facility] ONC INFUSION 120   2:00 PM   (120 min.)    ONCOLOGY INFUSION   [facility] 8    [facility] ONC INFUSION 240   7:00 AM   (240 min.)    ONCOLOGY INFUSION   [facility] 9       10     11     12     13    LAB  10:00 AM   (15 min.)    LAB   [facility] [place] 14     15    [facility] RETURN   2:45 PM   (30 min.)   Eric Duncan MD   [facility] 16       17     18     19     20     21     22     23       24     25     26     27     28     29     30       31                                               February 2021 Sunday Monday Tuesday Wednesday Thursday Friday Saturday        1     2     3     4     5     6       7     8     9     10     11     12     13       14     15     16     17     18     19     20       21     22     23     24     25     26     27       28                                                    Lab Results:  No results found for this or any previous visit (from the past 12 hour(s)).

## 2021-01-10 ENCOUNTER — HOSPITAL ENCOUNTER (EMERGENCY)
Facility: CLINIC | Age: 21
Discharge: HOME | End: 2021-01-10
Attending: EMERGENCY MEDICINE | Admitting: EMERGENCY MEDICINE
Payer: COMMERCIAL

## 2021-01-10 VITALS
BODY MASS INDEX: 27.31 KG/M2 | SYSTOLIC BLOOD PRESSURE: 123 MMHG | OXYGEN SATURATION: 95 % | RESPIRATION RATE: 18 BRPM | HEART RATE: 102 BPM | TEMPERATURE: 98.2 F | HEIGHT: 64 IN | WEIGHT: 160 LBS | DIASTOLIC BLOOD PRESSURE: 76 MMHG

## 2021-01-10 DIAGNOSIS — D57.00 SICKLE CELL PAIN CRISIS (H): ICD-10-CM

## 2021-01-10 LAB
ANION GAP SERPL CALCULATED.3IONS-SCNC: 9 MMOL/L (ref 3–14)
BASOPHILS # BLD AUTO: 0.1 10E9/L (ref 0–0.2)
BASOPHILS NFR BLD AUTO: 1.1 %
BUN SERPL-MCNC: 16 MG/DL (ref 7–30)
CALCIUM SERPL-MCNC: 9.2 MG/DL (ref 8.5–10.1)
CHLORIDE SERPL-SCNC: 108 MMOL/L (ref 94–109)
CO2 SERPL-SCNC: 22 MMOL/L (ref 20–32)
CREAT SERPL-MCNC: 0.63 MG/DL (ref 0.52–1.04)
DIFFERENTIAL METHOD BLD: ABNORMAL
EOSINOPHIL # BLD AUTO: 0.7 10E9/L (ref 0–0.7)
EOSINOPHIL NFR BLD AUTO: 5.9 %
ERYTHROCYTE [DISTWIDTH] IN BLOOD BY AUTOMATED COUNT: 20.5 % (ref 10–15)
GFR SERPL CREATININE-BSD FRML MDRD: >90 ML/MIN/{1.73_M2}
GLUCOSE SERPL-MCNC: 82 MG/DL (ref 70–99)
HCT VFR BLD AUTO: 26.2 % (ref 35–47)
HGB BLD-MCNC: 9.3 G/DL (ref 11.7–15.7)
IMM GRANULOCYTES # BLD: 0 10E9/L (ref 0–0.4)
IMM GRANULOCYTES NFR BLD: 0.3 %
LYMPHOCYTES # BLD AUTO: 2.5 10E9/L (ref 0.8–5.3)
LYMPHOCYTES NFR BLD AUTO: 22.6 %
MCH RBC QN AUTO: 32.4 PG (ref 26.5–33)
MCHC RBC AUTO-ENTMCNC: 35.5 G/DL (ref 31.5–36.5)
MCV RBC AUTO: 91 FL (ref 78–100)
MONOCYTES # BLD AUTO: 1.1 10E9/L (ref 0–1.3)
MONOCYTES NFR BLD AUTO: 10.1 %
NEUTROPHILS # BLD AUTO: 6.6 10E9/L (ref 1.6–8.3)
NEUTROPHILS NFR BLD AUTO: 60 %
NRBC # BLD AUTO: 0.3 10*3/UL
NRBC BLD AUTO-RTO: 3 /100
PLATELET # BLD AUTO: 315 10E9/L (ref 150–450)
POTASSIUM SERPL-SCNC: 3.9 MMOL/L (ref 3.4–5.3)
RBC # BLD AUTO: 2.87 10E12/L (ref 3.8–5.2)
RETICS # AUTO: 555.1 10E9/L (ref 25–95)
RETICS/RBC NFR AUTO: 19.3 % (ref 0.5–2)
SODIUM SERPL-SCNC: 138 MMOL/L (ref 133–144)
WBC # BLD AUTO: 10.9 10E9/L (ref 4–11)

## 2021-01-10 PROCEDURE — 258N000003 HC RX IP 258 OP 636: Performed by: EMERGENCY MEDICINE

## 2021-01-10 PROCEDURE — 85025 COMPLETE CBC W/AUTO DIFF WBC: CPT | Performed by: EMERGENCY MEDICINE

## 2021-01-10 PROCEDURE — 85045 AUTOMATED RETICULOCYTE COUNT: CPT | Performed by: EMERGENCY MEDICINE

## 2021-01-10 PROCEDURE — 96375 TX/PRO/DX INJ NEW DRUG ADDON: CPT | Performed by: EMERGENCY MEDICINE

## 2021-01-10 PROCEDURE — 250N000011 HC RX IP 250 OP 636: Performed by: EMERGENCY MEDICINE

## 2021-01-10 PROCEDURE — 96374 THER/PROPH/DIAG INJ IV PUSH: CPT | Performed by: EMERGENCY MEDICINE

## 2021-01-10 PROCEDURE — 250N000013 HC RX MED GY IP 250 OP 250 PS 637: Performed by: EMERGENCY MEDICINE

## 2021-01-10 PROCEDURE — 99285 EMERGENCY DEPT VISIT HI MDM: CPT | Performed by: EMERGENCY MEDICINE

## 2021-01-10 PROCEDURE — 96376 TX/PRO/DX INJ SAME DRUG ADON: CPT | Performed by: EMERGENCY MEDICINE

## 2021-01-10 PROCEDURE — 96361 HYDRATE IV INFUSION ADD-ON: CPT | Performed by: EMERGENCY MEDICINE

## 2021-01-10 PROCEDURE — 99285 EMERGENCY DEPT VISIT HI MDM: CPT | Mod: 25 | Performed by: EMERGENCY MEDICINE

## 2021-01-10 PROCEDURE — 80048 BASIC METABOLIC PNL TOTAL CA: CPT | Performed by: EMERGENCY MEDICINE

## 2021-01-10 RX ORDER — KETOROLAC TROMETHAMINE 30 MG/ML
30 INJECTION, SOLUTION INTRAMUSCULAR; INTRAVENOUS ONCE
Status: COMPLETED | OUTPATIENT
Start: 2021-01-10 | End: 2021-01-10

## 2021-01-10 RX ORDER — DIPHENHYDRAMINE HCL 25 MG
25 CAPSULE ORAL
Status: COMPLETED | OUTPATIENT
Start: 2021-01-10 | End: 2021-01-10

## 2021-01-10 RX ORDER — MORPHINE SULFATE 2 MG/ML
2 INJECTION, SOLUTION INTRAMUSCULAR; INTRAVENOUS EVERY 30 MIN PRN
Status: DISCONTINUED | OUTPATIENT
Start: 2021-01-10 | End: 2021-01-11 | Stop reason: HOSPADM

## 2021-01-10 RX ORDER — HEPARIN SODIUM (PORCINE) LOCK FLUSH IV SOLN 100 UNIT/ML 100 UNIT/ML
5 SOLUTION INTRAVENOUS
Status: DISCONTINUED | OUTPATIENT
Start: 2021-01-10 | End: 2021-01-11 | Stop reason: HOSPADM

## 2021-01-10 RX ORDER — MORPHINE SULFATE 4 MG/ML
4 INJECTION, SOLUTION INTRAMUSCULAR; INTRAVENOUS
Status: DISCONTINUED | OUTPATIENT
Start: 2021-01-10 | End: 2021-01-10

## 2021-01-10 RX ORDER — LIDOCAINE 40 MG/G
CREAM TOPICAL
Status: DISCONTINUED | OUTPATIENT
Start: 2021-01-10 | End: 2021-01-10 | Stop reason: CLARIF

## 2021-01-10 RX ORDER — ONDANSETRON 2 MG/ML
4 INJECTION INTRAMUSCULAR; INTRAVENOUS EVERY 30 MIN PRN
Status: DISCONTINUED | OUTPATIENT
Start: 2021-01-10 | End: 2021-01-11 | Stop reason: HOSPADM

## 2021-01-10 RX ADMIN — SODIUM CHLORIDE, POTASSIUM CHLORIDE, SODIUM LACTATE AND CALCIUM CHLORIDE 1000 ML: 600; 310; 30; 20 INJECTION, SOLUTION INTRAVENOUS at 21:23

## 2021-01-10 RX ADMIN — MORPHINE SULFATE 4 MG: 4 INJECTION INTRAVENOUS at 21:22

## 2021-01-10 RX ADMIN — DIPHENHYDRAMINE HYDROCHLORIDE 25 MG: 25 CAPSULE ORAL at 21:23

## 2021-01-10 RX ADMIN — MORPHINE SULFATE 2 MG: 2 INJECTION, SOLUTION INTRAMUSCULAR; INTRAVENOUS at 22:33

## 2021-01-10 RX ADMIN — Medication 5 ML: at 23:34

## 2021-01-10 RX ADMIN — MORPHINE SULFATE 2 MG: 2 INJECTION, SOLUTION INTRAMUSCULAR; INTRAVENOUS at 23:34

## 2021-01-10 RX ADMIN — KETOROLAC TROMETHAMINE 30 MG: 30 INJECTION, SOLUTION INTRAMUSCULAR at 21:22

## 2021-01-10 ASSESSMENT — ENCOUNTER SYMPTOMS
FEVER: 0
ABDOMINAL PAIN: 0
SHORTNESS OF BREATH: 0
VOMITING: 0

## 2021-01-10 ASSESSMENT — MIFFLIN-ST. JEOR: SCORE: 1475.76

## 2021-01-10 NOTE — ED AVS SNAPSHOT
[facility] Emergency Department  [address] [address]  Phone: [phone]                                    Jennifer Cervantes   MRN: [id number]    Department: [facility] Emergency Department   Date of Visit: 1/10/2021           After Visit Summary Signature Page    I have received my discharge instructions, and my questions have been answered. I have discussed any challenges I see with this plan with the nurse or doctor.    ..........................................................................................................................................  Patient/Patient Representative Signature      ..........................................................................................................................................  Patient Representative Print Name and Relationship to Patient    ..................................................               ................................................  Date                                   Time    ..........................................................................................................................................  Reviewed by Signature/Title    ...................................................              ..............................................  Date                                               Time          22EPIC Rev 08/18

## 2021-01-11 NOTE — ED PROVIDER NOTES
ED Provider Note  [facility]      History     Chief Complaint   Patient presents with     Sickle Cell Pain Crisis     The history is provided by the patient and medical records.     Jennifer Cervantes is a 20 year old female with a medical history significant for sickle cell disease, asthma, right-sided cerebral infarction, anxiety, and depression who presents to the Emergency Department for evaluation of sickle cell pain crisis. Patient reports that after her most recent blood transfusion last week, she started to experience increasing pain all over. Per nurse note, she reported pain to the arms, legs, and back. She denies fever, chest pain, or shortness of breath. She denies abdominal pain or vomiting. She denies unusual pain or swelling. She denies new allergies. She notes taking prescribed oxycodone, Tylenol, and ibuprofen for pain management.     Past Medical History:   Diagnosis Date     Anemia      Anxiety      Bleeding disorder (H)      Blood clotting disorder (H)      Cerebral infarction (H) 2015     Cognitive developmental delay     low IQ. 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 (H)     right hand contractures     Iron overload due to repeated red blood cell transfusions      Migraines      Oppositional defiant behavior      Uncomplicated asthma        Past Surgical History:   Procedure Laterality Date     AS INSERT TUNNELED CV 2 CATH W/O PORT/PUMP       C BREAST REDUCTION (INCLUDES LIPO) TIER 3 Bilateral 04/23/2019     IR CVC NON TUNNEL PLACEMENT  4/7/2020     REPAIR TENDON ELBOW Right 10/2/2019    Procedure: Right Elbow Flexor Lengthening, Flexor Pronator Slide Of Wrist and Finger, Thumb Adductor Lengthening;  Surgeon: Anai Franco MD;  Location: [facility] OR     TONSILLECTOMY Bilateral 10/2/2019    Procedure: Bilateral Tonsillectomy;   Surgeon: Farhana Guy MD;  Location: [facility] OR       Family History   Problem Relation Age of Onset     Sickle Cell Trait Mother      Sickle Cell Trait Father        Social History     Tobacco Use     Smoking status: Never Smoker     Smokeless tobacco: Never Used   Substance Use Topics     Alcohol use: Not Currently     Alcohol/week: 0.0 standard drinks       Current Facility-Administered Medications   Medication     diphenhydrAMINE (BENADRYL) capsule 25 mg     ketorolac (TORADOL) injection 30 mg     lactated ringers BOLUS 1,000 mL     morphine (PF) injection 4 mg     ondansetron (ZOFRAN) injection 4 mg     Current Outpatient Medications   Medication     acetaminophen (TYLENOL) 325 MG tablet     albuterol (PROAIR HFA/PROVENTIL HFA/VENTOLIN HFA) 108 (90 Base) MCG/ACT inhaler     albuterol (PROVENTIL) (2.5 MG/3ML) 0.083% neb solution     aspirin (ASPIRIN) 81 MG EC tablet     budesonide-formoterol (SYMBICORT) 160-4.5 MCG/ACT Inhaler     celecoxib (CELEBREX) 100 MG capsule     diphenhydrAMINE (BENADRYL) 25 MG capsule     hydroxyurea (HYDREA) 500 MG capsule     Hydroxyurea 1000 MG TABS     ibuprofen (ADVIL/MOTRIN) 200 MG tablet     JADENU 360 MG tablet     medroxyPROGESTERone (DEPO-PROVERA) 150 MG/ML IM injection     naloxone (NARCAN) 4 MG/0.1ML nasal spray     ondansetron (ZOFRAN) 8 MG tablet     oxyCODONE IR (ROXICODONE) 10 MG tablet     sertraline (ZOLOFT) 100 MG tablet        Allergies   Allergen Reactions     Fish-Derived Products Hives     Seafood Hives     Contrast Dye      Diagnostic X-Ray Materials      Gadolinium        Past Medical History  Past Medical History:   Diagnosis Date     Anemia      Anxiety      Bleeding disorder (H)      Blood clotting disorder (H)      Cerebral infarction (H) 2015     Cognitive developmental delay     low IQ. Please recognize when managing pain and planning with her     Depressive disorder      Hemiplegia and hemiparesis following cerebral infarction affecting right  dominant side (H)     right hand contractures     Iron overload due to repeated red blood cell transfusions      Migraines      Oppositional defiant behavior      Uncomplicated asthma      Past Surgical History:   Procedure Laterality Date     AS INSERT TUNNELED CV 2 CATH W/O PORT/PUMP       C BREAST REDUCTION (INCLUDES LIPO) TIER 3 Bilateral 04/23/2019     IR CVC NON TUNNEL PLACEMENT  4/7/2020     REPAIR TENDON ELBOW Right 10/2/2019    Procedure: Right Elbow Flexor Lengthening, Flexor Pronator Slide Of Wrist and Finger, Thumb Adductor Lengthening;  Surgeon: Anai Franco MD;  [facility]     TONSILLECTOMY Bilateral 10/2/2019    Procedure: Bilateral Tonsillectomy;  Surgeon: Farhana Guy MD;  [facility]: [facility] OR          acetaminophen (TYLENOL) 325 MG tablet       albuterol (PROAIR HFA/PROVENTIL HFA/VENTOLIN HFA) 108 (90 Base) MCG/ACT inhaler       albuterol (PROVENTIL) (2.5 MG/3ML) 0.083% neb solution       aspirin (ASPIRIN) 81 MG EC tablet       budesonide-formoterol (SYMBICORT) 160-4.5 MCG/ACT Inhaler       celecoxib (CELEBREX) 100 MG capsule       diphenhydrAMINE (BENADRYL) 25 MG capsule       hydroxyurea (HYDREA) 500 MG capsule       Hydroxyurea 1000 MG TABS       ibuprofen (ADVIL/MOTRIN) 200 MG tablet       JADENU 360 MG tablet       medroxyPROGESTERone (DEPO-PROVERA) 150 MG/ML IM injection       naloxone (NARCAN) 4 MG/0.1ML nasal spray       ondansetron (ZOFRAN) 8 MG tablet       oxyCODONE IR (ROXICODONE) 10 MG tablet       sertraline (ZOLOFT) 100 MG tablet      Allergies   Allergen Reactions     Fish-Derived Products Hives     Seafood Hives     Contrast Dye      Diagnostic X-Ray Materials      Gadolinium      Family History  Family History   Problem Relation Age of Onset     Sickle Cell Trait Mother      Sickle Cell Trait Father      Social History   Social History     Tobacco Use     Smoking status: Never Smoker     Smokeless tobacco: Never Used   Substance Use Topics     Alcohol  use: Not Currently     Alcohol/week: 0.0 standard drinks     Drug use: Never      Past medical history, past surgical history, medications, allergies, family history, and social history were reviewed with the patient. No additional pertinent items.       Review of Systems   Constitutional: Negative for fever.        Positive for pain all over the body including arms, legs, and back   Respiratory: Negative for shortness of breath.    Cardiovascular: Negative for chest pain.   Gastrointestinal: Negative for abdominal pain and vomiting.   Musculoskeletal:        Negative for swelling   All other systems reviewed and are negative.    A complete review of systems was performed with pertinent positives and negatives noted in the HPI, and all other systems negative.    Physical Exam      Physical Exam  Vitals signs and nursing note reviewed.   Constitutional:       Appearance: Normal appearance. She is not toxic-appearing.   HENT:      Head: Normocephalic.      Right Ear: External ear normal.      Left Ear: External ear normal.      Nose: No rhinorrhea.      Mouth/Throat:      Pharynx: Oropharynx is clear.   Eyes:      Conjunctiva/sclera: Conjunctivae normal.      Pupils: Pupils are equal, round, and reactive to light.   Neck:      Musculoskeletal: Normal range of motion and neck supple.   Cardiovascular:      Rate and Rhythm: Normal rate and regular rhythm.      Pulses: Normal pulses.   Pulmonary:      Effort: Pulmonary effort is normal. No respiratory distress.   Abdominal:      General: There is no distension.   Musculoskeletal:      Right lower leg: No edema.      Left lower leg: No edema.   Skin:     General: Skin is warm and dry.      Capillary Refill: Capillary refill takes less than 2 seconds.      Coloration: Skin is not jaundiced.      Findings: No rash.   Neurological:      General: No focal deficit present.      Mental Status: She is alert and oriented to person, place, and time.   Psychiatric:         Mood and  Affect: Mood normal.         Behavior: Behavior normal.       ED Course     ED Course as of Jan 11 0013   Sun Rigo 10, 2021   2050 Temp: 98.2  F (36.8  C)   2050 SpO2: 95 %   2144 % Retic similar to multiple prior tests   Reticulocyte count(!)   2144 Hemoglobin(!): 9.3   2144 Reassuring   Basic metabolic panel   2144 WBC: 10.9   2230 Reevaluation:    Patient relates symptom improvement, decreases pain.           8:48 PM  The patient was seen and examined by Jesus Joiner MD in Room ED06.      No results found for any visits on 01/10/21.  Medications   ondansetron (ZOFRAN) injection 4 mg (has no administration in time range)   morphine (PF) injection 4 mg (has no administration in time range)   ketorolac (TORADOL) injection 30 mg (has no administration in time range)   lactated ringers BOLUS 1,000 mL (has no administration in time range)   diphenhydrAMINE (BENADRYL) capsule 25 mg (has no administration in time range)        Assessments & Plan (with Medical Decision Making)   This is a 20-year-old woman presenting for evaluation of acute pain in the context of known sickle cell disease with frequent sickle cell pain crisis.  Differential diagnosis includes but is not limited to sickle cell pain crisis, anemia.  History and physical exam are inconsistent with acute chest syndrome, sepsis, or osteomyelitis at this time.  Diagnostic evaluation will include BC, BMP, reticulocyte count.  Therapy will be initiated per emergency department acute care plan.  Disposition pending results of diagnostic evaluation and response to therapeutic interventions.    I have reviewed the nursing notes. I have reviewed the findings, diagnosis, plan and need for follow up with the patient.    Final Diagnosis:  Acute sickle cell pain crisis    Disposition:  Discharge to home    I, Rachel Michael, am serving as a trained medical scribe to document services personally performed by Jesus Joiner MD, based on the provider's statements to me.       I, Jesus Joiner MD, was physically present and have reviewed and verified the accuracy of this note documented by Rachel Michael.     --  Jesus Joiner MD  [facility] ED EMERGENCY DEPARTMENT  1/10/2021     Jesus Joiner MD  01/11/21 0014

## 2021-01-11 NOTE — DISCHARGE INSTRUCTIONS
Please make an appointment to follow up with [facility] (phone: [phone]) as soon as possible for ongoing evaluation.    Return for severe worsening symptoms.

## 2021-01-11 NOTE — ED TRIAGE NOTES
Patient arrives to the ED with complaints of sickle cell pain. Patient had blood transfusion done last week with pain increasing for days following. Patient reports pain to the arms, legs and back.

## 2021-01-12 ENCOUNTER — TELEPHONE (OUTPATIENT)
Dept: ONCOLOGY | Facility: CLINIC | Age: 21
End: 2021-01-12

## 2021-01-12 ENCOUNTER — HOSPITAL ENCOUNTER (EMERGENCY)
Facility: CLINIC | Age: 21
Discharge: HOME | End: 2021-01-12
Attending: EMERGENCY MEDICINE | Admitting: EMERGENCY MEDICINE
Payer: COMMERCIAL

## 2021-01-12 VITALS
WEIGHT: 155 LBS | DIASTOLIC BLOOD PRESSURE: 60 MMHG | HEIGHT: 64 IN | HEART RATE: 106 BPM | BODY MASS INDEX: 26.46 KG/M2 | SYSTOLIC BLOOD PRESSURE: 121 MMHG | OXYGEN SATURATION: 96 % | TEMPERATURE: 98.1 F | RESPIRATION RATE: 16 BRPM

## 2021-01-12 DIAGNOSIS — D57.00 SICKLE CELL PAIN CRISIS (H): ICD-10-CM

## 2021-01-12 LAB
ALBUMIN SERPL-MCNC: 4.1 G/DL (ref 3.4–5)
ALP SERPL-CCNC: 70 U/L (ref 40–150)
ALT SERPL W P-5'-P-CCNC: 84 U/L (ref 0–50)
ANION GAP SERPL CALCULATED.3IONS-SCNC: 6 MMOL/L (ref 3–14)
AST SERPL W P-5'-P-CCNC: 104 U/L (ref 0–45)
BASOPHILS # BLD AUTO: 0.2 10E9/L (ref 0–0.2)
BASOPHILS NFR BLD AUTO: 1.6 %
BILIRUB SERPL-MCNC: 3.2 MG/DL (ref 0.2–1.3)
BUN SERPL-MCNC: 19 MG/DL (ref 7–30)
CALCIUM SERPL-MCNC: 8.9 MG/DL (ref 8.5–10.1)
CHLORIDE SERPL-SCNC: 108 MMOL/L (ref 94–109)
CO2 SERPL-SCNC: 24 MMOL/L (ref 20–32)
CREAT SERPL-MCNC: 0.66 MG/DL (ref 0.52–1.04)
DIFFERENTIAL METHOD BLD: ABNORMAL
EOSINOPHIL # BLD AUTO: 0.6 10E9/L (ref 0–0.7)
EOSINOPHIL NFR BLD AUTO: 4.6 %
ERYTHROCYTE [DISTWIDTH] IN BLOOD BY AUTOMATED COUNT: 19.3 % (ref 10–15)
GFR SERPL CREATININE-BSD FRML MDRD: >90 ML/MIN/{1.73_M2}
GLUCOSE SERPL-MCNC: 93 MG/DL (ref 70–99)
HCT VFR BLD AUTO: 23.3 % (ref 35–47)
HGB BLD-MCNC: 8.4 G/DL (ref 11.7–15.7)
IMM GRANULOCYTES # BLD: 0.1 10E9/L (ref 0–0.4)
IMM GRANULOCYTES NFR BLD: 0.5 %
LYMPHOCYTES # BLD AUTO: 2.9 10E9/L (ref 0.8–5.3)
LYMPHOCYTES NFR BLD AUTO: 23 %
MCH RBC QN AUTO: 32.8 PG (ref 26.5–33)
MCHC RBC AUTO-ENTMCNC: 36.1 G/DL (ref 31.5–36.5)
MCV RBC AUTO: 91 FL (ref 78–100)
MONOCYTES # BLD AUTO: 1.1 10E9/L (ref 0–1.3)
MONOCYTES NFR BLD AUTO: 9 %
NEUTROPHILS # BLD AUTO: 7.7 10E9/L (ref 1.6–8.3)
NEUTROPHILS NFR BLD AUTO: 61.3 %
NRBC # BLD AUTO: 0.1 10*3/UL
NRBC BLD AUTO-RTO: 1 /100
PLATELET # BLD AUTO: 288 10E9/L (ref 150–450)
POTASSIUM SERPL-SCNC: 3.9 MMOL/L (ref 3.4–5.3)
PROT SERPL-MCNC: 8 G/DL (ref 6.8–8.8)
RBC # BLD AUTO: 2.56 10E12/L (ref 3.8–5.2)
RETICS # AUTO: 284.2 10E9/L (ref 25–95)
RETICS/RBC NFR AUTO: 11.1 % (ref 0.5–2)
SODIUM SERPL-SCNC: 138 MMOL/L (ref 133–144)
WBC # BLD AUTO: 12.6 10E9/L (ref 4–11)

## 2021-01-12 PROCEDURE — 96376 TX/PRO/DX INJ SAME DRUG ADON: CPT

## 2021-01-12 PROCEDURE — 99285 EMERGENCY DEPT VISIT HI MDM: CPT | Mod: 25

## 2021-01-12 PROCEDURE — 85045 AUTOMATED RETICULOCYTE COUNT: CPT | Performed by: STUDENT IN AN ORGANIZED HEALTH CARE EDUCATION/TRAINING PROGRAM

## 2021-01-12 PROCEDURE — 250N000013 HC RX MED GY IP 250 OP 250 PS 637: Performed by: STUDENT IN AN ORGANIZED HEALTH CARE EDUCATION/TRAINING PROGRAM

## 2021-01-12 PROCEDURE — 80053 COMPREHEN METABOLIC PANEL: CPT | Performed by: STUDENT IN AN ORGANIZED HEALTH CARE EDUCATION/TRAINING PROGRAM

## 2021-01-12 PROCEDURE — 96375 TX/PRO/DX INJ NEW DRUG ADDON: CPT

## 2021-01-12 PROCEDURE — 96374 THER/PROPH/DIAG INJ IV PUSH: CPT

## 2021-01-12 PROCEDURE — 258N000003 HC RX IP 258 OP 636: Performed by: STUDENT IN AN ORGANIZED HEALTH CARE EDUCATION/TRAINING PROGRAM

## 2021-01-12 PROCEDURE — 85025 COMPLETE CBC W/AUTO DIFF WBC: CPT | Performed by: STUDENT IN AN ORGANIZED HEALTH CARE EDUCATION/TRAINING PROGRAM

## 2021-01-12 PROCEDURE — 250N000011 HC RX IP 250 OP 636: Performed by: STUDENT IN AN ORGANIZED HEALTH CARE EDUCATION/TRAINING PROGRAM

## 2021-01-12 PROCEDURE — 96361 HYDRATE IV INFUSION ADD-ON: CPT

## 2021-01-12 PROCEDURE — 99285 EMERGENCY DEPT VISIT HI MDM: CPT | Mod: GC | Performed by: EMERGENCY MEDICINE

## 2021-01-12 RX ORDER — MORPHINE SULFATE 2 MG/ML
2 INJECTION, SOLUTION INTRAMUSCULAR; INTRAVENOUS
Status: COMPLETED | OUTPATIENT
Start: 2021-01-12 | End: 2021-01-12

## 2021-01-12 RX ORDER — DIPHENHYDRAMINE HCL 25 MG
25 CAPSULE ORAL ONCE
Status: DISCONTINUED | OUTPATIENT
Start: 2021-01-12 | End: 2021-01-12

## 2021-01-12 RX ORDER — KETOROLAC TROMETHAMINE 30 MG/ML
30 INJECTION, SOLUTION INTRAMUSCULAR; INTRAVENOUS ONCE
Status: COMPLETED | OUTPATIENT
Start: 2021-01-12 | End: 2021-01-12

## 2021-01-12 RX ORDER — HEPARIN SODIUM (PORCINE) LOCK FLUSH IV SOLN 100 UNIT/ML 100 UNIT/ML
5 SOLUTION INTRAVENOUS
Status: DISCONTINUED | OUTPATIENT
Start: 2021-01-12 | End: 2021-01-13 | Stop reason: HOSPADM

## 2021-01-12 RX ORDER — ONDANSETRON 2 MG/ML
8 INJECTION INTRAMUSCULAR; INTRAVENOUS ONCE
Status: DISCONTINUED | OUTPATIENT
Start: 2021-01-12 | End: 2021-01-13 | Stop reason: HOSPADM

## 2021-01-12 RX ORDER — DIPHENHYDRAMINE HCL 50 MG
50 CAPSULE ORAL
Status: COMPLETED | OUTPATIENT
Start: 2021-01-12 | End: 2021-01-12

## 2021-01-12 RX ORDER — SODIUM CHLORIDE, SODIUM LACTATE, POTASSIUM CHLORIDE, CALCIUM CHLORIDE 600; 310; 30; 20 MG/100ML; MG/100ML; MG/100ML; MG/100ML
1000 INJECTION, SOLUTION INTRAVENOUS CONTINUOUS
Status: DISCONTINUED | OUTPATIENT
Start: 2021-01-12 | End: 2021-01-13 | Stop reason: HOSPADM

## 2021-01-12 RX ADMIN — MORPHINE SULFATE 2 MG: 2 INJECTION, SOLUTION INTRAMUSCULAR; INTRAVENOUS at 20:27

## 2021-01-12 RX ADMIN — DIPHENHYDRAMINE HYDROCHLORIDE 50 MG: 50 CAPSULE ORAL at 18:53

## 2021-01-12 RX ADMIN — KETOROLAC TROMETHAMINE 30 MG: 30 INJECTION, SOLUTION INTRAMUSCULAR at 18:52

## 2021-01-12 RX ADMIN — SODIUM CHLORIDE, POTASSIUM CHLORIDE, SODIUM LACTATE AND CALCIUM CHLORIDE 1000 ML: 600; 310; 30; 20 INJECTION, SOLUTION INTRAVENOUS at 18:52

## 2021-01-12 RX ADMIN — MORPHINE SULFATE 2 MG: 2 INJECTION, SOLUTION INTRAMUSCULAR; INTRAVENOUS at 18:52

## 2021-01-12 RX ADMIN — Medication 5 ML: at 22:19

## 2021-01-12 RX ADMIN — MORPHINE SULFATE 2 MG: 2 INJECTION, SOLUTION INTRAMUSCULAR; INTRAVENOUS at 22:19

## 2021-01-12 ASSESSMENT — MIFFLIN-ST. JEOR: SCORE: 1453.08

## 2021-01-12 NOTE — ED AVS SNAPSHOT
[facility] Emergency Department  [address] [address]  Phone: [phone]                                    Jennifer Cervantes   MRN: [id number]    Department: [facility] Emergency Department   Date of Visit: 1/12/2021           After Visit Summary Signature Page    I have received my discharge instructions, and my questions have been answered. I have discussed any challenges I see with this plan with the nurse or doctor.    ..........................................................................................................................................  Patient/Patient Representative Signature      ..........................................................................................................................................  Patient Representative Print Name and Relationship to Patient    ..................................................               ................................................  Date                                   Time    ..........................................................................................................................................  Reviewed by Signature/Title    ...................................................              ..............................................  Date                                               Time          22EPIC Rev 08/18

## 2021-01-12 NOTE — TELEPHONE ENCOUNTER
[facility] Telephone Triage Note    The following symptoms were reported:     Description:            Onset:  This morning  Location: Neck and arms  Character: Sharp           Intensity: 9/10    Accompanying Signs & Symptoms:  none    Chest Pain:  Yes, left sided chest pain constant pain    Shortness of Breath:  Denies     Fever:  Denies     Chills:  Denies   Cough/sore throat:  Denies  Other:  No Known     Therapies Tried and outcome: Home medications (last dose was 1.5 hours ago)    Improved by: nothing    Advised ED for evaluation of 9/10 neck and arm pain along with constant left sided chest pain. Patient in agreement and will present to [facility] ED. Report called to ED.

## 2021-01-13 ENCOUNTER — HOSPITAL ENCOUNTER (EMERGENCY)
Facility: CLINIC | Age: 21
Discharge: HOME | End: 2021-01-14
Attending: EMERGENCY MEDICINE | Admitting: EMERGENCY MEDICINE
Payer: COMMERCIAL

## 2021-01-13 ENCOUNTER — NURSE TRIAGE (OUTPATIENT)
Dept: NURSING | Facility: CLINIC | Age: 21
End: 2021-01-13

## 2021-01-13 DIAGNOSIS — D57.00 SICKLE CELL PAIN CRISIS (H): ICD-10-CM

## 2021-01-13 DIAGNOSIS — D57.1 HB-SS DISEASE WITHOUT CRISIS (H): ICD-10-CM

## 2021-01-13 DIAGNOSIS — D57.00 SICKLE CELL CRISIS (H): ICD-10-CM

## 2021-01-13 LAB
ALBUMIN SERPL-MCNC: 4.1 G/DL (ref 3.4–5)
ALBUMIN SERPL-MCNC: 4.1 G/DL (ref 3.4–5)
ALP SERPL-CCNC: 68 U/L (ref 40–150)
ALP SERPL-CCNC: 71 U/L (ref 40–150)
ALT SERPL W P-5'-P-CCNC: 84 U/L (ref 0–50)
ALT SERPL W P-5'-P-CCNC: 85 U/L (ref 0–50)
ANION GAP SERPL CALCULATED.3IONS-SCNC: 6 MMOL/L (ref 3–14)
ANION GAP SERPL CALCULATED.3IONS-SCNC: 9 MMOL/L (ref 3–14)
AST SERPL W P-5'-P-CCNC: 108 U/L (ref 0–45)
AST SERPL W P-5'-P-CCNC: 110 U/L (ref 0–45)
BASOPHILS # BLD AUTO: 0.2 10E9/L (ref 0–0.2)
BASOPHILS NFR BLD AUTO: 1.8 %
BILIRUB SERPL-MCNC: 3.1 MG/DL (ref 0.2–1.3)
BILIRUB SERPL-MCNC: 3.3 MG/DL (ref 0.2–1.3)
BUN SERPL-MCNC: 11 MG/DL (ref 7–30)
BUN SERPL-MCNC: 14 MG/DL (ref 7–30)
CALCIUM SERPL-MCNC: 8.7 MG/DL (ref 8.5–10.1)
CALCIUM SERPL-MCNC: 9 MG/DL (ref 8.5–10.1)
CHLORIDE SERPL-SCNC: 108 MMOL/L (ref 94–109)
CHLORIDE SERPL-SCNC: 109 MMOL/L (ref 94–109)
CO2 SERPL-SCNC: 24 MMOL/L (ref 20–32)
CO2 SERPL-SCNC: 24 MMOL/L (ref 20–32)
CREAT SERPL-MCNC: 0.51 MG/DL (ref 0.52–1.04)
CREAT SERPL-MCNC: 0.65 MG/DL (ref 0.52–1.04)
DIFFERENTIAL METHOD BLD: ABNORMAL
EOSINOPHIL # BLD AUTO: 0.7 10E9/L (ref 0–0.7)
EOSINOPHIL NFR BLD AUTO: 6.7 %
ERYTHROCYTE [DISTWIDTH] IN BLOOD BY AUTOMATED COUNT: 19.6 % (ref 10–15)
FERRITIN SERPL-MCNC: ABNORMAL NG/ML (ref 12–150)
GFR SERPL CREATININE-BSD FRML MDRD: >90 ML/MIN/{1.73_M2}
GFR SERPL CREATININE-BSD FRML MDRD: >90 ML/MIN/{1.73_M2}
GLUCOSE SERPL-MCNC: 90 MG/DL (ref 70–99)
GLUCOSE SERPL-MCNC: 99 MG/DL (ref 70–99)
HCG SERPL QL: NEGATIVE
HCT VFR BLD AUTO: 22.5 % (ref 35–47)
HCV AB SERPL QL IA: NONREACTIVE
HGB BLD-MCNC: 8.1 G/DL (ref 11.7–15.7)
IMM GRANULOCYTES # BLD: 0.1 10E9/L (ref 0–0.4)
IMM GRANULOCYTES NFR BLD: 0.5 %
LYMPHOCYTES # BLD AUTO: 2.4 10E9/L (ref 0.8–5.3)
LYMPHOCYTES NFR BLD AUTO: 23.2 %
MCH RBC QN AUTO: 32.3 PG (ref 26.5–33)
MCHC RBC AUTO-ENTMCNC: 36 G/DL (ref 31.5–36.5)
MCV RBC AUTO: 90 FL (ref 78–100)
MONOCYTES # BLD AUTO: 1 10E9/L (ref 0–1.3)
MONOCYTES NFR BLD AUTO: 9.9 %
NEUTROPHILS # BLD AUTO: 5.9 10E9/L (ref 1.6–8.3)
NEUTROPHILS NFR BLD AUTO: 57.9 %
NRBC # BLD AUTO: 0.1 10*3/UL
NRBC BLD AUTO-RTO: 1 /100
PLATELET # BLD AUTO: 285 10E9/L (ref 150–450)
POTASSIUM SERPL-SCNC: 3.7 MMOL/L (ref 3.4–5.3)
POTASSIUM SERPL-SCNC: 3.7 MMOL/L (ref 3.4–5.3)
PROT SERPL-MCNC: 7.8 G/DL (ref 6.8–8.8)
PROT SERPL-MCNC: 7.8 G/DL (ref 6.8–8.8)
RBC # BLD AUTO: 2.51 10E12/L (ref 3.8–5.2)
RETICS # AUTO: 353.2 10E9/L (ref 25–95)
RETICS/RBC NFR AUTO: 14 % (ref 0.5–2)
SODIUM SERPL-SCNC: 139 MMOL/L (ref 133–144)
SODIUM SERPL-SCNC: 141 MMOL/L (ref 133–144)
WBC # BLD AUTO: 10.3 10E9/L (ref 4–11)

## 2021-01-13 PROCEDURE — 250N000013 HC RX MED GY IP 250 OP 250 PS 637: Performed by: EMERGENCY MEDICINE

## 2021-01-13 PROCEDURE — 84703 CHORIONIC GONADOTROPIN ASSAY: CPT | Performed by: EMERGENCY MEDICINE

## 2021-01-13 PROCEDURE — 96361 HYDRATE IV INFUSION ADD-ON: CPT | Performed by: EMERGENCY MEDICINE

## 2021-01-13 PROCEDURE — 36415 COLL VENOUS BLD VENIPUNCTURE: CPT | Performed by: PATHOLOGY

## 2021-01-13 PROCEDURE — 85025 COMPLETE CBC W/AUTO DIFF WBC: CPT | Performed by: EMERGENCY MEDICINE

## 2021-01-13 PROCEDURE — 86803 HEPATITIS C AB TEST: CPT | Mod: 90 | Performed by: PATHOLOGY

## 2021-01-13 PROCEDURE — 96374 THER/PROPH/DIAG INJ IV PUSH: CPT | Performed by: EMERGENCY MEDICINE

## 2021-01-13 PROCEDURE — 258N000003 HC RX IP 258 OP 636: Performed by: EMERGENCY MEDICINE

## 2021-01-13 PROCEDURE — 82728 ASSAY OF FERRITIN: CPT | Performed by: PATHOLOGY

## 2021-01-13 PROCEDURE — 80053 COMPREHEN METABOLIC PANEL: CPT | Performed by: EMERGENCY MEDICINE

## 2021-01-13 PROCEDURE — 99000 SPECIMEN HANDLING OFFICE-LAB: CPT | Performed by: PATHOLOGY

## 2021-01-13 PROCEDURE — 85045 AUTOMATED RETICULOCYTE COUNT: CPT | Performed by: PATHOLOGY

## 2021-01-13 PROCEDURE — 99285 EMERGENCY DEPT VISIT HI MDM: CPT | Mod: 25 | Performed by: EMERGENCY MEDICINE

## 2021-01-13 PROCEDURE — 85045 AUTOMATED RETICULOCYTE COUNT: CPT | Performed by: EMERGENCY MEDICINE

## 2021-01-13 PROCEDURE — 80053 COMPREHEN METABOLIC PANEL: CPT | Performed by: PATHOLOGY

## 2021-01-13 PROCEDURE — 96375 TX/PRO/DX INJ NEW DRUG ADDON: CPT | Performed by: EMERGENCY MEDICINE

## 2021-01-13 PROCEDURE — 250N000011 HC RX IP 250 OP 636: Performed by: EMERGENCY MEDICINE

## 2021-01-13 PROCEDURE — 85025 COMPLETE CBC W/AUTO DIFF WBC: CPT | Performed by: PATHOLOGY

## 2021-01-13 PROCEDURE — 99285 EMERGENCY DEPT VISIT HI MDM: CPT | Performed by: EMERGENCY MEDICINE

## 2021-01-13 RX ORDER — MORPHINE SULFATE 4 MG/ML
2 INJECTION, SOLUTION INTRAMUSCULAR; INTRAVENOUS
Status: COMPLETED | OUTPATIENT
Start: 2021-01-13 | End: 2021-01-14

## 2021-01-13 RX ORDER — KETOROLAC TROMETHAMINE 30 MG/ML
30 INJECTION, SOLUTION INTRAMUSCULAR; INTRAVENOUS ONCE
Status: COMPLETED | OUTPATIENT
Start: 2021-01-13 | End: 2021-01-13

## 2021-01-13 RX ORDER — DIPHENHYDRAMINE HCL 50 MG
50 CAPSULE ORAL ONCE
Status: COMPLETED | OUTPATIENT
Start: 2021-01-13 | End: 2021-01-13

## 2021-01-13 RX ADMIN — DIPHENHYDRAMINE HYDROCHLORIDE 50 MG: 50 CAPSULE ORAL at 23:37

## 2021-01-13 RX ADMIN — SODIUM CHLORIDE, POTASSIUM CHLORIDE, SODIUM LACTATE AND CALCIUM CHLORIDE 1000 ML: 600; 310; 30; 20 INJECTION, SOLUTION INTRAVENOUS at 23:37

## 2021-01-13 RX ADMIN — KETOROLAC TROMETHAMINE 30 MG: 30 INJECTION, SOLUTION INTRAMUSCULAR at 23:37

## 2021-01-13 RX ADMIN — MORPHINE SULFATE 2 MG: 4 INJECTION INTRAVENOUS at 23:41

## 2021-01-13 ASSESSMENT — ENCOUNTER SYMPTOMS
SHORTNESS OF BREATH: 0
ARTHRALGIAS: 0
NECK STIFFNESS: 0
CONFUSION: 0
COLOR CHANGE: 0
DIFFICULTY URINATING: 0
HEADACHES: 0
EYE REDNESS: 0
FEVER: 0
BACK PAIN: 1
ABDOMINAL PAIN: 0

## 2021-01-13 NOTE — ED TRIAGE NOTES
Sickle cell pain; pt reports pain to back and joints. Denies abdominal/chest pain/shortness of breath. Presented last 2 nights to the ED for same symptoms. Denies fever.

## 2021-01-13 NOTE — DISCHARGE INSTRUCTIONS
Follow up with your hematologist on Friday. Return for increasing pain not treatable at home, shortness of breath, fevers or chills.

## 2021-01-13 NOTE — ED AVS SNAPSHOT
[facility] Emergency Department  [address] [address]  Phone: [phone]                                    Jennifer Cervantes   MRN: [id number]    Department: [facility] Emergency Department   Date of Visit: 1/13/2021           After Visit Summary Signature Page    I have received my discharge instructions, and my questions have been answered. I have discussed any challenges I see with this plan with the nurse or doctor.    ..........................................................................................................................................  Patient/Patient Representative Signature      ..........................................................................................................................................  Patient Representative Print Name and Relationship to Patient    ..................................................               ................................................  Date                                   Time    ..........................................................................................................................................  Reviewed by Signature/Title    ...................................................              ..............................................  Date                                               Time          22EPIC Rev 08/18

## 2021-01-13 NOTE — ED PROVIDER NOTES
ED Provider Note  [facility]      History     Chief Complaint   Patient presents with     Sickle Cell Pain Crisis     HPI  Jennifer Cervantes is a 20 year old female with a medical history significant for sickle cell disease, asthma, right-sided cerebral infarction, anxiety, and depression who presents to the Emergency Department for evaluation of sickle cell pain crisis. She reports pain all over her body.  This episode started this morning, she reports she took her at home pain meds including Tylenol ibuprofen and oxycodone with minimal relief. Reports this feels exactly like previous sickle cell pain episodes.  She denies fever, chest pain, or shortness of breath. She denies abdominal pain or vomiting. Additionally she denies any sick contacts. Additionally no urinary symptoms or concerns. Last transfusion on 1/8/2021.    Past Medical History  Past Medical History:   Diagnosis Date     Anemia      Anxiety      Bleeding disorder (H)      Blood clotting disorder (H)      Cerebral infarction (H) 2015     Cognitive developmental delay     low IQ. 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 (H)     right hand contractures     Iron overload due to repeated red blood cell transfusions      Migraines      Oppositional defiant behavior      Uncomplicated asthma      Past Surgical History:   Procedure Laterality Date     AS INSERT TUNNELED CV 2 CATH W/O PORT/PUMP       C BREAST REDUCTION (INCLUDES LIPO) TIER 3 Bilateral 04/23/2019     IR CVC NON TUNNEL PLACEMENT  4/7/2020     REPAIR TENDON ELBOW Right 10/2/2019    Procedure: Right Elbow Flexor Lengthening, Flexor Pronator Slide Of Wrist and Finger, Thumb Adductor Lengthening;  Surgeon: Anai Franco MD;  [facility]     TONSILLECTOMY Bilateral 10/2/2019    Procedure: Bilateral Tonsillectomy;  Surgeon: Brooks  "Farhana Hopper MD;  [facility] OR          acetaminophen (TYLENOL) 325 MG tablet       albuterol (PROAIR HFA/PROVENTIL HFA/VENTOLIN HFA) 108 (90 Base) MCG/ACT inhaler       albuterol (PROVENTIL) (2.5 MG/3ML) 0.083% neb solution       aspirin (ASPIRIN) 81 MG EC tablet       budesonide-formoterol (SYMBICORT) 160-4.5 MCG/ACT Inhaler       celecoxib (CELEBREX) 100 MG capsule       diphenhydrAMINE (BENADRYL) 25 MG capsule       hydroxyurea (HYDREA) 500 MG capsule       Hydroxyurea 1000 MG TABS       ibuprofen (ADVIL/MOTRIN) 200 MG tablet       JADENU 360 MG tablet       medroxyPROGESTERone (DEPO-PROVERA) 150 MG/ML IM injection       naloxone (NARCAN) 4 MG/0.1ML nasal spray       ondansetron (ZOFRAN) 8 MG tablet       oxyCODONE IR (ROXICODONE) 10 MG tablet       sertraline (ZOLOFT) 100 MG tablet      Allergies   Allergen Reactions     Fish-Derived Products Hives     Seafood Hives     Contrast Dye      Diagnostic X-Ray Materials      Gadolinium      Family History  Family History   Problem Relation Age of Onset     Sickle Cell Trait Mother      Sickle Cell Trait Father      Social History   Social History     Tobacco Use     Smoking status: Never Smoker     Smokeless tobacco: Never Used   Substance Use Topics     Alcohol use: Not Currently     Alcohol/week: 0.0 standard drinks     Drug use: Never      Past medical history, past surgical history, medications, allergies, family history, and social history were reviewed with the patient. No additional pertinent items.       Review of Systems  A complete review of systems was performed with pertinent positives and negatives noted in the HPI, and all other systems negative.    Physical Exam   BP: 124/81  Pulse: 114  Temp: 98.1  F (36.7  C)  Resp: 16  Height: 162.6 cm (5' 4\")  Weight: 70.3 kg (155 lb)  SpO2: 93 %  Physical Exam  Gen:In moderate distress, laying on side, pleasant, interactive.  HEENT: NC/AT, MMM, MIRTHA, EOMI, Supple  CV: RRR, normal s1, normal s2, no " m/r/g  Resp: CTAB, normal I/E effort, no additional respiratory sounds  Abd: +BS, non-tender, non-distended, no guarding or rebound tenderness  Ext:No significant deformities or trauma, moving all ext freely  Skin: No erythema, no lesions or rashes.   Neuro:AxOx4. Strength and sensation grossly intact  Psych: Pleasant, answering questions appropriately, normal mood/affect. Insight good, judgement intact.   ED Course      Procedures                         Results for orders placed or performed during the hospital encounter of 01/12/21   CBC with platelets differential     Status: Abnormal   Result Value Ref Range    WBC 12.6 (H) 4.0 - 11.0 10e9/L    RBC Count 2.56 (L) 3.8 - 5.2 10e12/L    Hemoglobin 8.4 (L) 11.7 - 15.7 g/dL    Hematocrit 23.3 (L) 35.0 - 47.0 %    MCV 91 78 - 100 fl    MCH 32.8 26.5 - 33.0 pg    MCHC 36.1 31.5 - 36.5 g/dL    RDW 19.3 (H) 10.0 - 15.0 %    Platelet Count 288 150 - 450 10e9/L    Diff Method Automated Method     % Neutrophils 61.3 %    % Lymphocytes 23.0 %    % Monocytes 9.0 %    % Eosinophils 4.6 %    % Basophils 1.6 %    % Immature Granulocytes 0.5 %    Nucleated RBCs 1 (H) 0 /100    Absolute Neutrophil 7.7 1.6 - 8.3 10e9/L    Absolute Lymphocytes 2.9 0.8 - 5.3 10e9/L    Absolute Monocytes 1.1 0.0 - 1.3 10e9/L    Absolute Eosinophils 0.6 0.0 - 0.7 10e9/L    Absolute Basophils 0.2 0.0 - 0.2 10e9/L    Abs Immature Granulocytes 0.1 0 - 0.4 10e9/L    Absolute Nucleated RBC 0.1    Comprehensive metabolic panel     Status: Abnormal   Result Value Ref Range    Sodium 138 133 - 144 mmol/L    Potassium 3.9 3.4 - 5.3 mmol/L    Chloride 108 94 - 109 mmol/L    Carbon Dioxide 24 20 - 32 mmol/L    Anion Gap 6 3 - 14 mmol/L    Glucose 93 70 - 99 mg/dL    Urea Nitrogen 19 7 - 30 mg/dL    Creatinine 0.66 0.52 - 1.04 mg/dL    GFR Estimate >90 >60 mL/min/[1.73_m2]    GFR Estimate If Black >90 >60 mL/min/[1.73_m2]    Calcium 8.9 8.5 - 10.1 mg/dL    Bilirubin Total 3.2 (H) 0.2 - 1.3 mg/dL    Albumin 4.1  3.4 - 5.0 g/dL    Protein Total 8.0 6.8 - 8.8 g/dL    Alkaline Phosphatase 70 40 - 150 U/L    ALT 84 (H) 0 - 50 U/L     (H) 0 - 45 U/L   Reticulocyte count     Status: Abnormal   Result Value Ref Range    % Retic 11.1 (H) 0.5 - 2.0 %    Absolute Retic 284.2 (H) 25 - 95 10e9/L     Medications   lactated ringers BOLUS 1,000 mL (0 mLs Intravenous Stopped 1/12/21 2219)   morphine (PF) injection 2 mg (2 mg Intravenous Given 1/12/21 2219)   ketorolac (TORADOL) injection 30 mg (30 mg Intravenous Given 1/12/21 1852)   diphenhydrAMINE (BENADRYL) capsule 50 mg (50 mg Oral Given 1/12/21 1853)        Assessments & Plan (with Medical Decision Making)   Patient is a 20 year old with a history of sickle cell who presents with symptoms consistent with pain crisis. She denies any symptoms concerning for acute chest (chest pain, shortness of breath, cough). Denies any obvious infectious symptoms, physical exam is reassuring.  Patient keeping oxygen saturations above 92% on room air.  She is mildly tachycardic to 114, hemoglobin 8.4, gets frequent transfusions managed by her outpatient provider, states they have been attempting to reduce these and she reports infrequent schedule which only occurs when she thinks she needs a transfusion she calls her provider and they discuss this.  Per chart review it appears that these usually happen when her hemoglobin drops below 7.  Given this we will elect to treat her symptoms in the ED per her treatment plan as no further work-up is indicated at this time.    2031-patient reports feeling somewhat better after 1 dose of morphine is now requesting her next dose and something to eat.   2120-2nd dose of morphine was given, patient reports to continuously feel better, updated her on plan to get her pain under control and hopefully discharge.  2200 spoke to patient, states she is feeling much better now, requesting 3rd dose of morphine and then would like to go home. Discharged home in stable  condition, has follow up appointment with hematologist this week.     I have reviewed the nursing notes. I have reviewed the findings, diagnosis, plan and need for follow up with the patient.    Discharge Medication List as of 1/12/2021 10:35 PM          Final diagnoses:   Sickle cell pain crisis (H)       Vito Quintero MD  Internal Medicine PGY-1      [facility] EMERGENCY DEPARTMENT  1/12/2021     Vito Quintero MD  Resident  01/12/21 2223      This data collected with the Resident working in the Emergency Department.  Patient was seen and evaluated by myself and I repeated the history and physical exam with the patient.  The plan of care was discussed with them.  The key portions of the note including the entire assessment and plan reflect my documentation.             Mykel Luz,   01/13/21 2021

## 2021-01-14 ENCOUNTER — TELEPHONE (OUTPATIENT)
Dept: ONCOLOGY | Facility: CLINIC | Age: 21
End: 2021-01-14

## 2021-01-14 ENCOUNTER — HOSPITAL ENCOUNTER (EMERGENCY)
Facility: CLINIC | Age: 21
Discharge: HOME | End: 2021-01-15
Attending: EMERGENCY MEDICINE | Admitting: EMERGENCY MEDICINE
Payer: COMMERCIAL

## 2021-01-14 VITALS
DIASTOLIC BLOOD PRESSURE: 70 MMHG | RESPIRATION RATE: 16 BRPM | TEMPERATURE: 98.9 F | HEART RATE: 100 BPM | OXYGEN SATURATION: 93 % | SYSTOLIC BLOOD PRESSURE: 111 MMHG

## 2021-01-14 DIAGNOSIS — J45.909 ASTHMA: Primary | ICD-10-CM

## 2021-01-14 DIAGNOSIS — D57.00 SICKLE CELL PAIN CRISIS (H): ICD-10-CM

## 2021-01-14 LAB
ANION GAP SERPL CALCULATED.3IONS-SCNC: 6 MMOL/L (ref 3–14)
BASOPHILS # BLD AUTO: 0.3 10E9/L (ref 0–0.2)
BASOPHILS # BLD AUTO: 0.3 10E9/L (ref 0–0.2)
BASOPHILS NFR BLD AUTO: 2.1 %
BASOPHILS NFR BLD AUTO: 2.2 %
BUN SERPL-MCNC: 15 MG/DL (ref 7–30)
CALCIUM SERPL-MCNC: 8.8 MG/DL (ref 8.5–10.1)
CHLORIDE SERPL-SCNC: 111 MMOL/L (ref 94–109)
CO2 SERPL-SCNC: 22 MMOL/L (ref 20–32)
CREAT SERPL-MCNC: 0.63 MG/DL (ref 0.52–1.04)
DIFFERENTIAL METHOD BLD: ABNORMAL
DIFFERENTIAL METHOD BLD: ABNORMAL
EOSINOPHIL # BLD AUTO: 0.6 10E9/L (ref 0–0.7)
EOSINOPHIL # BLD AUTO: 0.8 10E9/L (ref 0–0.7)
EOSINOPHIL NFR BLD AUTO: 4.7 %
EOSINOPHIL NFR BLD AUTO: 6.1 %
ERYTHROCYTE [DISTWIDTH] IN BLOOD BY AUTOMATED COUNT: 19.7 % (ref 10–15)
ERYTHROCYTE [DISTWIDTH] IN BLOOD BY AUTOMATED COUNT: 21.2 % (ref 10–15)
GFR SERPL CREATININE-BSD FRML MDRD: >90 ML/MIN/{1.73_M2}
GLUCOSE SERPL-MCNC: 82 MG/DL (ref 70–99)
HCT VFR BLD AUTO: 20.8 % (ref 35–47)
HCT VFR BLD AUTO: 20.9 % (ref 35–47)
HGB BLD-MCNC: 7.5 G/DL (ref 11.7–15.7)
HGB BLD-MCNC: 7.6 G/DL (ref 11.7–15.7)
IMM GRANULOCYTES # BLD: 0.1 10E9/L (ref 0–0.4)
IMM GRANULOCYTES # BLD: 0.1 10E9/L (ref 0–0.4)
IMM GRANULOCYTES NFR BLD: 0.5 %
IMM GRANULOCYTES NFR BLD: 0.5 %
LYMPHOCYTES # BLD AUTO: 3.4 10E9/L (ref 0.8–5.3)
LYMPHOCYTES # BLD AUTO: 3.4 10E9/L (ref 0.8–5.3)
LYMPHOCYTES NFR BLD AUTO: 25 %
LYMPHOCYTES NFR BLD AUTO: 27 %
MCH RBC QN AUTO: 31.6 PG (ref 26.5–33)
MCH RBC QN AUTO: 32.2 PG (ref 26.5–33)
MCHC RBC AUTO-ENTMCNC: 36.1 G/DL (ref 31.5–36.5)
MCHC RBC AUTO-ENTMCNC: 36.4 G/DL (ref 31.5–36.5)
MCV RBC AUTO: 88 FL (ref 78–100)
MCV RBC AUTO: 89 FL (ref 78–100)
MONOCYTES # BLD AUTO: 1 10E9/L (ref 0–1.3)
MONOCYTES # BLD AUTO: 1.3 10E9/L (ref 0–1.3)
MONOCYTES NFR BLD AUTO: 7.6 %
MONOCYTES NFR BLD AUTO: 9.9 %
NEUTROPHILS # BLD AUTO: 7 10E9/L (ref 1.6–8.3)
NEUTROPHILS # BLD AUTO: 8.1 10E9/L (ref 1.6–8.3)
NEUTROPHILS NFR BLD AUTO: 54.4 %
NEUTROPHILS NFR BLD AUTO: 60 %
NRBC # BLD AUTO: 0.2 10*3/UL
NRBC # BLD AUTO: 0.2 10*3/UL
NRBC BLD AUTO-RTO: 1 /100
NRBC BLD AUTO-RTO: 2 /100
PLATELET # BLD AUTO: 317 10E9/L (ref 150–450)
PLATELET # BLD AUTO: 346 10E9/L (ref 150–450)
POTASSIUM SERPL-SCNC: 4.3 MMOL/L (ref 3.4–5.3)
RBC # BLD AUTO: 2.36 10E12/L (ref 3.8–5.2)
RBC # BLD AUTO: 2.37 10E12/L (ref 3.8–5.2)
RETICS # AUTO: 300.3 10E9/L (ref 25–95)
RETICS/RBC NFR AUTO: 12.7 % (ref 0.5–2)
SODIUM SERPL-SCNC: 139 MMOL/L (ref 133–144)
WBC # BLD AUTO: 12.8 10E9/L (ref 4–11)
WBC # BLD AUTO: 13.5 10E9/L (ref 4–11)

## 2021-01-14 PROCEDURE — 250N000011 HC RX IP 250 OP 636: Performed by: EMERGENCY MEDICINE

## 2021-01-14 PROCEDURE — 85025 COMPLETE CBC W/AUTO DIFF WBC: CPT | Performed by: EMERGENCY MEDICINE

## 2021-01-14 PROCEDURE — 96374 THER/PROPH/DIAG INJ IV PUSH: CPT

## 2021-01-14 PROCEDURE — 96376 TX/PRO/DX INJ SAME DRUG ADON: CPT

## 2021-01-14 PROCEDURE — 96376 TX/PRO/DX INJ SAME DRUG ADON: CPT | Performed by: EMERGENCY MEDICINE

## 2021-01-14 PROCEDURE — 99285 EMERGENCY DEPT VISIT HI MDM: CPT | Performed by: EMERGENCY MEDICINE

## 2021-01-14 PROCEDURE — 80048 BASIC METABOLIC PNL TOTAL CA: CPT | Performed by: EMERGENCY MEDICINE

## 2021-01-14 PROCEDURE — 96361 HYDRATE IV INFUSION ADD-ON: CPT

## 2021-01-14 PROCEDURE — 258N000003 HC RX IP 258 OP 636: Performed by: EMERGENCY MEDICINE

## 2021-01-14 PROCEDURE — 96375 TX/PRO/DX INJ NEW DRUG ADDON: CPT

## 2021-01-14 PROCEDURE — 99285 EMERGENCY DEPT VISIT HI MDM: CPT | Mod: 25,27

## 2021-01-14 RX ORDER — ONDANSETRON 2 MG/ML
4 INJECTION INTRAMUSCULAR; INTRAVENOUS EVERY 30 MIN PRN
Status: COMPLETED | OUTPATIENT
Start: 2021-01-14 | End: 2021-01-14

## 2021-01-14 RX ORDER — MORPHINE SULFATE 2 MG/ML
2 INJECTION, SOLUTION INTRAMUSCULAR; INTRAVENOUS
Status: COMPLETED | OUTPATIENT
Start: 2021-01-14 | End: 2021-01-14

## 2021-01-14 RX ORDER — HEPARIN SODIUM (PORCINE) LOCK FLUSH IV SOLN 100 UNIT/ML 100 UNIT/ML
100 SOLUTION INTRAVENOUS ONCE
Status: COMPLETED | OUTPATIENT
Start: 2021-01-15 | End: 2021-01-15

## 2021-01-14 RX ORDER — HEPARIN SODIUM (PORCINE) LOCK FLUSH IV SOLN 100 UNIT/ML 100 UNIT/ML
5 SOLUTION INTRAVENOUS
Status: DISCONTINUED | OUTPATIENT
Start: 2021-01-14 | End: 2021-01-14 | Stop reason: HOSPADM

## 2021-01-14 RX ORDER — KETOROLAC TROMETHAMINE 30 MG/ML
30 INJECTION, SOLUTION INTRAMUSCULAR; INTRAVENOUS ONCE
Status: COMPLETED | OUTPATIENT
Start: 2021-01-14 | End: 2021-01-14

## 2021-01-14 RX ADMIN — KETOROLAC TROMETHAMINE 30 MG: 30 INJECTION, SOLUTION INTRAMUSCULAR at 21:22

## 2021-01-14 RX ADMIN — ONDANSETRON 4 MG: 2 INJECTION INTRAMUSCULAR; INTRAVENOUS at 23:55

## 2021-01-14 RX ADMIN — HEPARIN 5 ML: 100 SYRINGE at 03:17

## 2021-01-14 RX ADMIN — MORPHINE SULFATE 2 MG: 2 INJECTION, SOLUTION INTRAMUSCULAR; INTRAVENOUS at 23:55

## 2021-01-14 RX ADMIN — MORPHINE SULFATE 2 MG: 4 INJECTION INTRAVENOUS at 01:58

## 2021-01-14 RX ADMIN — MORPHINE SULFATE 2 MG: 2 INJECTION, SOLUTION INTRAMUSCULAR; INTRAVENOUS at 22:44

## 2021-01-14 RX ADMIN — MORPHINE SULFATE 2 MG: 2 INJECTION, SOLUTION INTRAMUSCULAR; INTRAVENOUS at 21:22

## 2021-01-14 RX ADMIN — ONDANSETRON 4 MG: 2 INJECTION INTRAMUSCULAR; INTRAVENOUS at 22:44

## 2021-01-14 RX ADMIN — ONDANSETRON 4 MG: 2 INJECTION INTRAMUSCULAR; INTRAVENOUS at 21:22

## 2021-01-14 RX ADMIN — SODIUM CHLORIDE, POTASSIUM CHLORIDE, SODIUM LACTATE AND CALCIUM CHLORIDE 1000 ML: 600; 310; 30; 20 INJECTION, SOLUTION INTRAVENOUS at 21:21

## 2021-01-14 RX ADMIN — MORPHINE SULFATE 2 MG: 4 INJECTION INTRAVENOUS at 00:52

## 2021-01-14 ASSESSMENT — ENCOUNTER SYMPTOMS
NECK STIFFNESS: 0
HEADACHES: 0
ARTHRALGIAS: 0
DIFFICULTY URINATING: 0
BACK PAIN: 1
ABDOMINAL PAIN: 0
COUGH: 0
CONFUSION: 0
SHORTNESS OF BREATH: 0
NAUSEA: 0
DIARRHEA: 0
EYE REDNESS: 0
COLOR CHANGE: 0
VOMITING: 0
FEVER: 0

## 2021-01-14 ASSESSMENT — MIFFLIN-ST. JEOR: SCORE: 1453.08

## 2021-01-14 NOTE — TELEPHONE ENCOUNTER
[facility] Telephone Triage Note    The following symptoms were reported:     Description:            Onset:  Pain crisis has been on and off and she states the snow/cold today has flared pain    Location: back, she feels she has to move around to ease the pain, staying in one spot aggravates the pain  Character: Sharp           Intensity: 8/10    Accompanying Signs & Symptoms:  none    Chest Pain:  Denies     Shortness of Breath:  Denies     Fever:  Denies     Chills:  Denies   Cough/sore throat:  Denies  Other:  No known COVID-19 contacts    Therapies Tried and outcome: Took all home meds, she's tried oxycodone every six hours, Tylenol and Ibuprofen. She states her last dose of oxycodone was at 1000. She state she had not taken the oxycodone every four hours PRN per prescription directions.    Improved by: nothing    The following provider was consulted:  Perla     The following advice/orders were given, and/or interventions recommended:  Okay for patient to present to ED.       Patient instructions and/or follow up:  Patient notified and will present to ED. Report called to ED.

## 2021-01-14 NOTE — ED PROVIDER NOTES
ED Provider Note  [facility]      History     Chief Complaint   Patient presents with     Sickle Cell Pain Crisis     HPI  Jennifer Cervantes is a 20 year old female with a history of sickle cell anemia who presents to the emergency department with pain exacerbation.  The patient states that she began having recurrent pain in her upper back and into her shoulders this morning.  She has tried all of her home medications, warm baths, and oral fluids without relief of her pain.  She feels that the cold weather is the exacerbating factor.  She denies any other recent illness.  She denies any fever.  No cough.  No dyspnea.  No chest pain.  Patient denies any abdominal pain.  No nausea or vomiting.  She denies any dysuria, urgency, or frequency.    Past Medical History  Past Medical History:   Diagnosis Date     Anemia      Anxiety      Bleeding disorder (H)      Blood clotting disorder (H)      Cerebral infarction (H) 2015     Cognitive developmental delay     low IQ. 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 (H)     right hand contractures     Iron overload due to repeated red blood cell transfusions      Migraines      Oppositional defiant behavior      Uncomplicated asthma      Past Surgical History:   Procedure Laterality Date     AS INSERT TUNNELED CV 2 CATH W/O PORT/PUMP       C BREAST REDUCTION (INCLUDES LIPO) TIER 3 Bilateral 04/23/2019     IR CVC NON TUNNEL PLACEMENT  4/7/2020     REPAIR TENDON ELBOW Right 10/2/2019    Procedure: Right Elbow Flexor Lengthening, Flexor Pronator Slide Of Wrist and Finger, Thumb Adductor Lengthening;  Surgeon: Anai Franco MD;  [facility]     TONSILLECTOMY Bilateral 10/2/2019    Procedure: Bilateral Tonsillectomy;  Surgeon: Farhana Guy MD;  [facility] OR          acetaminophen (TYLENOL) 325 MG  tablet       albuterol (PROAIR HFA/PROVENTIL HFA/VENTOLIN HFA) 108 (90 Base) MCG/ACT inhaler       albuterol (PROVENTIL) (2.5 MG/3ML) 0.083% neb solution       aspirin (ASPIRIN) 81 MG EC tablet       budesonide-formoterol (SYMBICORT) 160-4.5 MCG/ACT Inhaler       celecoxib (CELEBREX) 100 MG capsule       diphenhydrAMINE (BENADRYL) 25 MG capsule       hydroxyurea (HYDREA) 500 MG capsule       Hydroxyurea 1000 MG TABS       ibuprofen (ADVIL/MOTRIN) 200 MG tablet       JADENU 360 MG tablet       medroxyPROGESTERone (DEPO-PROVERA) 150 MG/ML IM injection       naloxone (NARCAN) 4 MG/0.1ML nasal spray       ondansetron (ZOFRAN) 8 MG tablet       oxyCODONE IR (ROXICODONE) 10 MG tablet       sertraline (ZOLOFT) 100 MG tablet      Allergies   Allergen Reactions     Fish-Derived Products Hives     Seafood Hives     Contrast Dye      Diagnostic X-Ray Materials      Gadolinium      Family History  Family History   Problem Relation Age of Onset     Sickle Cell Trait Mother      Sickle Cell Trait Father      Social History   Social History     Tobacco Use     Smoking status: Never Smoker     Smokeless tobacco: Never Used   Substance Use Topics     Alcohol use: Not Currently     Alcohol/week: 0.0 standard drinks     Drug use: Never      Past medical history, past surgical history, medications, allergies, family history, and social history were reviewed with the patient. No additional pertinent items.       Review of Systems   Constitutional: Negative for fever.   HENT: Negative for congestion.    Eyes: Negative for redness.   Respiratory: Negative for shortness of breath.    Cardiovascular: Negative for chest pain.   Gastrointestinal: Negative for abdominal pain.   Genitourinary: Negative for difficulty urinating.   Musculoskeletal: Positive for back pain. Negative for arthralgias and neck stiffness.   Skin: Negative for color change.   Neurological: Negative for headaches.   Psychiatric/Behavioral: Negative for confusion.   All  other systems reviewed and are negative.    A complete review of systems was performed with pertinent positives and negatives noted in the HPI, and all other systems negative.    Physical Exam   BP: (!) 140/64  Pulse: 106  Temp: 98.9  F (37.2  C)  Resp: 16  SpO2: 93 %  Physical Exam  Vitals signs and nursing note reviewed.   Constitutional:       General: She is not in acute distress.     Appearance: She is not diaphoretic.   HENT:      Head: Atraumatic.      Mouth/Throat:      Pharynx: No oropharyngeal exudate.   Eyes:      General: No scleral icterus.     Pupils: Pupils are equal, round, and reactive to light.   Neck:      Musculoskeletal: Normal range of motion.   Cardiovascular:      Rate and Rhythm: Normal rate and regular rhythm.      Pulses: Normal pulses.      Heart sounds: Normal heart sounds.   Pulmonary:      Effort: Pulmonary effort is normal. No respiratory distress.      Breath sounds: Normal breath sounds.   Abdominal:      General: Bowel sounds are normal.      Palpations: Abdomen is soft.      Tenderness: There is no abdominal tenderness.   Musculoskeletal: Normal range of motion.         General: No tenderness.   Skin:     General: Skin is warm and dry.      Findings: No rash.   Neurological:      General: No focal deficit present.      Cranial Nerves: No cranial nerve deficit.      Motor: No weakness.      Coordination: Coordination normal.      Gait: Gait normal.   Psychiatric:         Mood and Affect: Mood normal.         Behavior: Behavior normal.         ED Course      Procedures           Results for orders placed or performed during the hospital encounter of 01/13/21   CBC with platelets differential     Status: Abnormal (In process)   Result Value Ref Range    WBC 12.8 (H) 4.0 - 11.0 10e9/L    RBC Count 2.37 (L) 3.8 - 5.2 10e12/L    Hemoglobin 7.5 (L) 11.7 - 15.7 g/dL    Hematocrit 20.8 (L) 35.0 - 47.0 %    MCV 88 78 - 100 fl    MCH 31.6 26.5 - 33.0 pg    MCHC 36.1 31.5 - 36.5 g/dL    RDW  19.7 (H) 10.0 - 15.0 %    Platelet Count 317 150 - 450 10e9/L    Diff Method PENDING    Comprehensive metabolic panel     Status: Abnormal   Result Value Ref Range    Sodium 141 133 - 144 mmol/L    Potassium 3.7 3.4 - 5.3 mmol/L    Chloride 108 94 - 109 mmol/L    Carbon Dioxide 24 20 - 32 mmol/L    Anion Gap 9 3 - 14 mmol/L    Glucose 99 70 - 99 mg/dL    Urea Nitrogen 11 7 - 30 mg/dL    Creatinine 0.65 0.52 - 1.04 mg/dL    GFR Estimate >90 >60 mL/min/[1.73_m2]    GFR Estimate If Black >90 >60 mL/min/[1.73_m2]    Calcium 9.0 8.5 - 10.1 mg/dL    Bilirubin Total 3.1 (H) 0.2 - 1.3 mg/dL    Albumin 4.1 3.4 - 5.0 g/dL    Protein Total 7.8 6.8 - 8.8 g/dL    Alkaline Phosphatase 71 40 - 150 U/L    ALT 85 (H) 0 - 50 U/L     (H) 0 - 45 U/L   HCG qualitative Blood     Status: None   Result Value Ref Range    HCG Qualitative Serum Negative NEG^Negative     Medications   morphine (PF) injection 2 mg (2 mg Intravenous Given 1/14/21 0052)   lactated ringers BOLUS 1,000 mL (1,000 mLs Intravenous New Bag 1/13/21 2337)   ketorolac (TORADOL) injection 30 mg (30 mg Intravenous Given 1/13/21 2337)   diphenhydrAMINE (BENADRYL) capsule 50 mg (50 mg Oral Given 1/13/21 2337)        Assessments & Plan (with Medical Decision Making)   20 year old female with history of sickle cell anemia to the emergency department with typical sickle cell pain crisis.  She does not have any symptoms concerning for acute chest syndrome.  She is not hypoxic.  The patient again has a mild leukocytosis today like yesterday.  Her white count had normalized on labs drawn earlier today.  Her reticulocyte count is appropriate.  Her liver enzymes are unchanged.  Patient was treated according to her treatment plan including IV lactated Ringer's, Toradol, Benadryl, and morphine.  The patient is feeling better after second dose of morphine.  We will check a urine due to her leukocytosis.  Patient continues to improve, she will be appropriate for discharge and  outpatient management.  Signed out at change of shift.    I have reviewed the nursing notes. I have reviewed the findings, diagnosis, plan and need for follow up with the patient.    New Prescriptions    No medications on file       Final diagnoses:   Sickle cell pain crisis (H)     Chart documentation was completed with Dragon voice-recognition software. Even though reviewed, this chart may still contain some grammatical, spelling, and word errors.    --  Francesco Green Md  [facility] EMERGENCY DEPARTMENT  1/13/2021     Francesco Green MD  01/14/21 0056       Francesco Green MD  01/14/21 0104

## 2021-01-14 NOTE — ED PROVIDER NOTES
Emergency Department Patient Sign-out       Brief HPI:  This is a 20 year old female signed out to me by Dr. Green.  See initial ED Provider note for details of the presentation.       Exam:   Patient Vitals for the past 24 hrs:   BP Temp Temp src Pulse Resp SpO2   01/14/21 0200 111/70 -- -- 100 -- --   01/14/21 0100 118/79 -- -- 109 -- --   01/14/21 0000 130/75 -- -- 96 -- 93 %   01/13/21 2345 -- -- -- -- -- 95 %   01/13/21 2315 139/80 -- -- -- -- --   01/13/21 2245 (!) 140/64 98.9  F (37.2  C) Oral 106 16 93 %           ED RESULTS:   Results for orders placed or performed during the hospital encounter of 01/13/21 (from the past 24 hour(s))   CBC with platelets differential     Status: Abnormal    Collection Time: 01/13/21 11:21 PM   Result Value Ref Range    WBC 12.8 (H) 4.0 - 11.0 10e9/L    RBC Count 2.37 (L) 3.8 - 5.2 10e12/L    Hemoglobin 7.5 (L) 11.7 - 15.7 g/dL    Hematocrit 20.8 (L) 35.0 - 47.0 %    MCV 88 78 - 100 fl    MCH 31.6 26.5 - 33.0 pg    MCHC 36.1 31.5 - 36.5 g/dL    RDW 19.7 (H) 10.0 - 15.0 %    Platelet Count 317 150 - 450 10e9/L    Diff Method Automated Method     % Neutrophils 54.4 %    % Lymphocytes 27.0 %    % Monocytes 9.9 %    % Eosinophils 6.1 %    % Basophils 2.1 %    % Immature Granulocytes 0.5 %    Nucleated RBCs 1 (H) 0 /100    Absolute Neutrophil 7.0 1.6 - 8.3 10e9/L    Absolute Lymphocytes 3.4 0.8 - 5.3 10e9/L    Absolute Monocytes 1.3 0.0 - 1.3 10e9/L    Absolute Eosinophils 0.8 (H) 0.0 - 0.7 10e9/L    Absolute Basophils 0.3 (H) 0.0 - 0.2 10e9/L    Abs Immature Granulocytes 0.1 0 - 0.4 10e9/L    Absolute Nucleated RBC 0.2    Comprehensive metabolic panel     Status: Abnormal    Collection Time: 01/13/21 11:21 PM   Result Value Ref Range    Sodium 141 133 - 144 mmol/L    Potassium 3.7 3.4 - 5.3 mmol/L    Chloride 108 94 - 109 mmol/L    Carbon Dioxide 24 20 - 32 mmol/L    Anion Gap 9 3 - 14 mmol/L    Glucose 99 70 - 99 mg/dL    Urea Nitrogen 11 7 - 30 mg/dL    Creatinine 0.65  0.52 - 1.04 mg/dL    GFR Estimate >90 >60 mL/min/[1.73_m2]    GFR Estimate If Black >90 >60 mL/min/[1.73_m2]    Calcium 9.0 8.5 - 10.1 mg/dL    Bilirubin Total 3.1 (H) 0.2 - 1.3 mg/dL    Albumin 4.1 3.4 - 5.0 g/dL    Protein Total 7.8 6.8 - 8.8 g/dL    Alkaline Phosphatase 71 40 - 150 U/L    ALT 85 (H) 0 - 50 U/L     (H) 0 - 45 U/L   HCG qualitative Blood     Status: None    Collection Time: 01/13/21 11:21 PM   Result Value Ref Range    HCG Qualitative Serum Negative NEG^Negative   Reticulocyte count     Status: Abnormal    Collection Time: 01/13/21 11:21 PM   Result Value Ref Range    % Retic 12.7 (H) 0.5 - 2.0 %    Absolute Retic 300.3 (H) 25 - 95 10e9/L       ED MEDICATIONS:   Medications   lactated ringers BOLUS 1,000 mL (0 mLs Intravenous Stopped 1/14/21 0127)   morphine (PF) injection 2 mg (2 mg Intravenous Given 1/14/21 0158)   ketorolac (TORADOL) injection 30 mg (30 mg Intravenous Given 1/13/21 2337)   diphenhydrAMINE (BENADRYL) capsule 50 mg (50 mg Oral Given 1/13/21 2337)         Impression:    ICD-10-CM    1. Sickle cell pain crisis (H)  D57.00 CBC with platelets differential     Reticulocyte count       Plan:    Pending studies include reevaluation after pain medication.  Patient received 3 doses of IV morphine and on reevaluation patient reports improvement of pain and feels comfortable discharge home.  Plan for discharge home with close outpatient follow-up, return precautions discussed if high fever, chest pain, shortness of breath, weakness, severe pain, any worsening symptoms.  Patient understands agrees with plan.      MD Mario Kruse Linsey Gail, MD  01/14/21 9410

## 2021-01-14 NOTE — TELEPHONE ENCOUNTER
RECORDS RECEIVED FROM: internal/CE   DATE RECEIVED: 1.29.21   NOTES STATUS DETAILS   OFFICE NOTE from referring provider Eric Bruno   OFFICE NOTE from other specialist na    DISCHARGE SUMMARY from hospital na    DISCHARGE REPORT from the ER na    MEDICATION LIST Internal     IMAGING  (NEED IMAGES AND REPORTS)     CT SCAN na    [facility]   Internal- 12.10.20, 12.7.20, 12.3.20, 4.7.20, 3.29.30, 3.28.20, 1.27.20, 10.10.19, 10.7.19, 1.18.19    TESTS     PULMONARY FUNCTION TESTING (PFT) In process  Scheduled 1.22.21        Action 1.14.21 sv   Action Taken [facility]   CXR- 12.18.19 -- received image in PACS---     Message sent to nurse pool to place PFT order

## 2021-01-14 NOTE — DISCHARGE INSTRUCTIONS
Continue current medications.  Contact your hematologist later today for follow-up planning.    Turn to the emergency department if fever, worsening symptoms, trouble breathing, or other concerns.

## 2021-01-14 NOTE — ED AVS SNAPSHOT
[facility]  [address] [address]  Phone: [phone]                                    Jennifer Cervantes   MRN: [id number]    Department: [facility]   Date of Visit: 1/14/2021           After Visit Summary Signature Page    I have received my discharge instructions, and my questions have been answered. I have discussed any challenges I see with this plan with the nurse or doctor.    ..........................................................................................................................................  Patient/Patient Representative Signature      ..........................................................................................................................................  Patient Representative Print Name and Relationship to Patient    ..................................................               ................................................  Date                                   Time    ..........................................................................................................................................  Reviewed by Signature/Title    ...................................................              ..............................................  Date                                               Time          22EPIC Rev 08/18

## 2021-01-14 NOTE — TELEPHONE ENCOUNTER
Pt called stating she has sickle cell disease, and reporting pain on  her back and shoulders. Pt reported her pain level is 8 and half, pain level. Pt reported last night and the past couple nights, she has been having pain.    Pt reported she took all of the pain medications she can including  tylenol, and ibuprofen, an hour ago with no pain relief.    RN paged on call provider received a return call from Dr Conner, who advised pt to go to the [facility] emergency department to be seen. RN called pt back and informed her to go to the [facility] emergency department. Pt stated she will call a cup and go to the [facility].      Hola Uriarte RN  [facility]     COVID 19 Nurse Triage Plan/Patient Instructions    Please be aware that novel coronavirus (COVID-19) may be circulating in the community. If you develop symptoms such as fever, cough, or SOB or if you have concerns about the presence of another infection including coronavirus (COVID-19), please contact your health care provider or visit [email or web address].     Disposition/Instructions    ED Visit recommended. Follow protocol based instructions.     Bring Your Own Device:  Please also bring your smart device(s) (smart phones, tablets, laptops) and their charging cables for your personal use and to communicate with your care team during your visit.    Thank you for taking steps to prevent the spread of this virus.  o Limit your contact with others.  o Wear a simple mask to cover your cough.  o Wash your hands well and often.    Resources    [facility]: About COVID-19: [email or web address]    CDC: What to Do If You're Sick: www.cdc.gov/coronavirus/2019-ncov/about/steps-when-sick.html    CDC: Ending Home Isolation: www.cdc.gov/coronavirus/2019-ncov/hcp/disposition-in-home-patients.html     CDC: Caring for Someone: www.cdc.gov/coronavirus/2019-ncov/if-you-are-sick/care-for-someone.html     [facility]: Interim Guidance for Hospital Discharge to Home:  www.health.[facility].mn.us/diseases/coronavirus/hcp/hospdischarge.pdf    [facility] clinical trials (COVID-19 research studies): [email or web address]-trials     Below are the COVID-19 hotlines at the [facility] ([facility]). Interpreters are available.   o For health questions: Call 712-316-6244 or 1-795.886.4720 (7 a.m. to 7 p.m.)  o For questions about schools and childcare: Call 930-290-4911 or 1-773.406.4799 (7 a.m. to 7 p.m.)       Additional Information    Negative: Passed out (i.e., lost consciousness, collapsed and was not responding)    Negative: Shock suspected (e.g., cold/pale/clammy skin, too weak to stand, low BP, rapid pulse)    Negative: Sounds like a life-threatening emergency to the triager    Negative: Major injury to the back (e.g., MVA, fall > 10 feet or 3 meters, penetrating injury, etc.)    Negative: Followed a tailbone injury    Negative: [1] Pain in the upper back over the ribs (rib cage) AND [2] worsened by coughing (or clearly increases with breathing)    Negative: Back pain during pregnancy    Negative: Pain mainly in flank (i.e., in the side, over the lower ribs or just below the ribs)    Negative: [1] Pain in the upper back over the ribs (rib cage) AND [2] radiates (travels, goes) into chest    Negative: [1] SEVERE back pain (e.g., excruciating) AND [2] sudden onset AND [3] age > 60    Negative: [1] Unable to urinate (or only a few drops) > 4 hours AND [2] bladder feels very full (e.g., palpable bladder or strong urge to urinate)    Negative: [1] Loss of bladder or bowel control (urine or bowel incontinence; wetting self, leaking stool) AND [2] new onset    Negative: Numbness in groin or rectal area (i.e., loss of sensation)    Negative: [1] SEVERE abdominal pain AND [2] present > 1 hour    Negative: [1] Abdominal pain AND [2] age > 60    Negative: Weakness of a leg or foot (e.g., unable to bear weight, dragging foot)    Negative: Unable to walk     Negative: Patient sounds very sick or weak to the triager    [1] SEVERE back pain (e.g., excruciating, unable to do any normal activities) AND [2] not improved 2 hours after pain medicine    Protocols used: BACK PAIN-A-AH

## 2021-01-14 NOTE — ED TRIAGE NOTES
Pt presents to triage c/o sickle cell pain since this morning. Tried home remedies and all medications available to patient at home with no relief. Pain is in back radiating to shoulders   VSS

## 2021-01-15 ENCOUNTER — ONCOLOGY VISIT (OUTPATIENT)
Dept: ONCOLOGY | Facility: CLINIC | Age: 21
End: 2021-01-15
Attending: PEDIATRICS
Payer: COMMERCIAL

## 2021-01-15 VITALS
TEMPERATURE: 99.2 F | BODY MASS INDEX: 26.46 KG/M2 | DIASTOLIC BLOOD PRESSURE: 82 MMHG | SYSTOLIC BLOOD PRESSURE: 125 MMHG | HEIGHT: 64 IN | WEIGHT: 155 LBS | OXYGEN SATURATION: 99 % | HEART RATE: 105 BPM | RESPIRATION RATE: 16 BRPM

## 2021-01-15 VITALS
DIASTOLIC BLOOD PRESSURE: 81 MMHG | BODY MASS INDEX: 27.49 KG/M2 | TEMPERATURE: 98.9 F | HEART RATE: 103 BPM | HEIGHT: 64 IN | WEIGHT: 161 LBS | SYSTOLIC BLOOD PRESSURE: 133 MMHG | RESPIRATION RATE: 16 BRPM | OXYGEN SATURATION: 98 %

## 2021-01-15 DIAGNOSIS — D57.1 HB-SS DISEASE WITHOUT CRISIS (H): Primary | ICD-10-CM

## 2021-01-15 DIAGNOSIS — E83.111 HEMOCHROMATOSIS DUE TO REPEATED RED BLOOD CELL TRANSFUSIONS: ICD-10-CM

## 2021-01-15 DIAGNOSIS — Z79.899 ENCOUNTER FOR MONITORING IRON CHELATION THERAPY: ICD-10-CM

## 2021-01-15 DIAGNOSIS — Z51.81 ENCOUNTER FOR MONITORING IRON CHELATION THERAPY: ICD-10-CM

## 2021-01-15 PROCEDURE — G0463 HOSPITAL OUTPT CLINIC VISIT: HCPCS

## 2021-01-15 PROCEDURE — 99213 OFFICE O/P EST LOW 20 MIN: CPT | Performed by: PEDIATRICS

## 2021-01-15 PROCEDURE — 250N000011 HC RX IP 250 OP 636: Performed by: EMERGENCY MEDICINE

## 2021-01-15 RX ADMIN — Medication 100 UNITS: at 00:12

## 2021-01-15 ASSESSMENT — PAIN SCALES - GENERAL: PAINLEVEL: NO PAIN (0)

## 2021-01-15 ASSESSMENT — MIFFLIN-ST. JEOR: SCORE: 1480.29

## 2021-01-15 NOTE — ED NOTES
Bed: IN01  Expected date:   Expected time:   Means of arrival:   Comments:  Jennifer Cervantes  [id number]  Sickle cell pain crisis. 8/10 pain in back. No fever or chest pain.

## 2021-01-15 NOTE — ED PROVIDER NOTES
ED Provider Note  [facility]      History     Chief Complaint   Patient presents with     Back Pain     Sickle Cell Pain Crisis     The history is provided by the patient and medical records.     Jennifer Cervantes is a 20 year old female with a history of sickle cell anemia (last infusion 1/8), asthma, right-sided cerebral infarction, anxiety, and depression who presents to the ED today for evaluation of sickle cell pain crisis. The patient reports that she was seen here in the ED yesterday for this pain. She thought that she could manage it at home, but it has not improved. She reports back pain that is typical for her sickle cell crises. She states that the only difference is that this time she cannot lay or sit in the same position for very long because of the pain. Standing up and walking does not make it better, but it also does not make it worse. It is not made worse with deep inspiration. She denies cough, fever, shortness of breath, abdominal pain, nausea, vomiting, diarrhea, dysuria, and chance of pregnancy.     Past Medical History  Past Medical History:   Diagnosis Date     Anemia      Anxiety      Bleeding disorder (H)      Blood clotting disorder (H)      Cerebral infarction (H) 2015     Cognitive developmental delay     low IQ. 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 (H)     right hand contractures     Iron overload due to repeated red blood cell transfusions      Migraines      Oppositional defiant behavior      Uncomplicated asthma      Past Surgical History:   Procedure Laterality Date     AS INSERT TUNNELED CV 2 CATH W/O PORT/PUMP       C BREAST REDUCTION (INCLUDES LIPO) TIER 3 Bilateral 04/23/2019     IR CVC NON TUNNEL PLACEMENT  4/7/2020     REPAIR TENDON ELBOW Right 10/2/2019    Procedure: Right Elbow Flexor Lengthening, Flexor Pronator  Slide Of Wrist and Finger, Thumb Adductor Lengthening;  Surgeon: Anai Franco MD;  Location: [facility] OR     TONSILLECTOMY Bilateral 10/2/2019    Procedure: Bilateral Tonsillectomy;  Surgeon: Farhana Guy MD;  Location: [facility] OR          acetaminophen (TYLENOL) 325 MG tablet       albuterol (PROAIR HFA/PROVENTIL HFA/VENTOLIN HFA) 108 (90 Base) MCG/ACT inhaler       albuterol (PROVENTIL) (2.5 MG/3ML) 0.083% neb solution       aspirin (ASPIRIN) 81 MG EC tablet       budesonide-formoterol (SYMBICORT) 160-4.5 MCG/ACT Inhaler       celecoxib (CELEBREX) 100 MG capsule       diphenhydrAMINE (BENADRYL) 25 MG capsule       hydroxyurea (HYDREA) 500 MG capsule       Hydroxyurea 1000 MG TABS       ibuprofen (ADVIL/MOTRIN) 200 MG tablet       JADENU 360 MG tablet       medroxyPROGESTERone (DEPO-PROVERA) 150 MG/ML IM injection       naloxone (NARCAN) 4 MG/0.1ML nasal spray       ondansetron (ZOFRAN) 8 MG tablet       oxyCODONE IR (ROXICODONE) 10 MG tablet       sertraline (ZOLOFT) 100 MG tablet      Allergies   Allergen Reactions     Fish-Derived Products Hives     Seafood Hives     Contrast Dye      Diagnostic X-Ray Materials      Gadolinium      Family History  Family History   Problem Relation Age of Onset     Sickle Cell Trait Mother      Sickle Cell Trait Father      Social History   Social History     Tobacco Use     Smoking status: Never Smoker     Smokeless tobacco: Never Used   Substance Use Topics     Alcohol use: Not Currently     Alcohol/week: 0.0 standard drinks     Drug use: Never      Past medical history, past surgical history, medications, allergies, family history, and social history were reviewed with the patient. No additional pertinent items.       Review of Systems   Constitutional: Negative for fever.   HENT: Negative for congestion.    Eyes: Negative for redness.   Respiratory: Negative for cough and shortness of breath.    Cardiovascular: Negative for chest pain.   Gastrointestinal:  "Negative for abdominal pain, diarrhea, nausea and vomiting.   Genitourinary: Negative for difficulty urinating.   Musculoskeletal: Positive for back pain. Negative for arthralgias and neck stiffness.   Skin: Negative for color change.   Neurological: Negative for headaches.   Psychiatric/Behavioral: Negative for confusion.   All other systems reviewed and are negative.    A complete review of systems was performed with pertinent positives and negatives noted in the HPI, and all other systems negative.    Physical Exam   BP: (!) 153/88  Pulse: 104  Temp: 99.2  F (37.3  C)  Resp: 16  Height: 162.6 cm (5' 4\")  Weight: 70.3 kg (155 lb)  SpO2: 93 %  Physical Exam  Vitals signs and nursing note reviewed.   Constitutional:       General: She is not in acute distress.     Appearance: Normal appearance. She is well-developed. She is not ill-appearing or diaphoretic.   HENT:      Head: Normocephalic and atraumatic.      Nose: Nose normal. No congestion or rhinorrhea.      Mouth/Throat:      Mouth: Mucous membranes are moist.   Eyes:      General: No scleral icterus.     Extraocular Movements: Extraocular movements intact.      Conjunctiva/sclera: Conjunctivae normal.   Neck:      Musculoskeletal: Normal range of motion and neck supple. No neck rigidity.   Cardiovascular:      Rate and Rhythm: Regular rhythm. Tachycardia present.   Pulmonary:      Effort: Pulmonary effort is normal. No respiratory distress.      Breath sounds: No stridor.   Abdominal:      General: There is no distension.      Palpations: Abdomen is soft.      Tenderness: There is no abdominal tenderness. There is no guarding or rebound.   Musculoskeletal: Normal range of motion.         General: No deformity or signs of injury.      Thoracic back: She exhibits normal range of motion, no bony tenderness, no swelling, no edema and no deformity.      Right lower leg: No edema.      Left lower leg: No edema.   Skin:     General: Skin is warm and dry.      " Coloration: Skin is not jaundiced or pale.      Findings: No rash.   Neurological:      General: No focal deficit present.      Mental Status: She is alert and oriented to person, place, and time.   Psychiatric:         Mood and Affect: Mood normal.         Behavior: Behavior normal.         Thought Content: Thought content normal.         ED Course      Procedures                         Results for orders placed or performed during the hospital encounter of 01/14/21   CBC with platelets differential     Status: Abnormal   Result Value Ref Range    WBC 13.5 (H) 4.0 - 11.0 10e9/L    RBC Count 2.36 (L) 3.8 - 5.2 10e12/L    Hemoglobin 7.6 (L) 11.7 - 15.7 g/dL    Hematocrit 20.9 (L) 35.0 - 47.0 %    MCV 89 78 - 100 fl    MCH 32.2 26.5 - 33.0 pg    MCHC 36.4 31.5 - 36.5 g/dL    RDW 21.2 (H) 10.0 - 15.0 %    Platelet Count 346 150 - 450 10e9/L    Diff Method Automated Method     % Neutrophils 60.0 %    % Lymphocytes 25.0 %    % Monocytes 7.6 %    % Eosinophils 4.7 %    % Basophils 2.2 %    % Immature Granulocytes 0.5 %    Nucleated RBCs 2 (H) 0 /100    Absolute Neutrophil 8.1 1.6 - 8.3 10e9/L    Absolute Lymphocytes 3.4 0.8 - 5.3 10e9/L    Absolute Monocytes 1.0 0.0 - 1.3 10e9/L    Absolute Eosinophils 0.6 0.0 - 0.7 10e9/L    Absolute Basophils 0.3 (H) 0.0 - 0.2 10e9/L    Abs Immature Granulocytes 0.1 0 - 0.4 10e9/L    Absolute Nucleated RBC 0.2    Basic metabolic panel     Status: Abnormal   Result Value Ref Range    Sodium 139 133 - 144 mmol/L    Potassium 4.3 3.4 - 5.3 mmol/L    Chloride 111 (H) 94 - 109 mmol/L    Carbon Dioxide 22 20 - 32 mmol/L    Anion Gap 6 3 - 14 mmol/L    Glucose 82 70 - 99 mg/dL    Urea Nitrogen 15 7 - 30 mg/dL    Creatinine 0.63 0.52 - 1.04 mg/dL    GFR Estimate >90 >60 mL/min/[1.73_m2]    GFR Estimate If Black >90 >60 mL/min/[1.73_m2]    Calcium 8.8 8.5 - 10.1 mg/dL     Medications   lactated ringers BOLUS 1,000 mL (0 mLs Intravenous Stopped 1/14/21 5775)   ondansetron (ZOFRAN) injection 4 mg  (4 mg Intravenous Given 1/14/21 2355)   morphine (PF) injection 2 mg (2 mg Intravenous Given 1/14/21 2355)   ketorolac (TORADOL) injection 30 mg (30 mg Intravenous Given 1/14/21 2122)   heparin 100 UNIT/ML injection 100 Units (100 Units Intravenous Given 1/15/21 0012)        Assessments & Plan (with Medical Decision Making)   Jennifer Cervantes is a 20 year old female with a history of sickle cell anemia (last infusion 1/8), asthma, right-sided cerebral infarction, anxiety, and depression who presents to the ED today for evaluation of sickle cell pain crisis.    Ddx: Sickle cell pain crisis, dehydration, musculoskeletal back pain, anemia    Seen yesterday evening for the same complaint and discharged after treatment with pain medications.  Patient states she developed recurrent pain today despite her home pain regimen.  She would like to receive a few doses of her IV rescue pain medications but ultimately hopes to discharge home.  She denies any new symptoms and has typical back pain for her usual sickle cell pain flares.  No dyspnea or fever.  No pleurisy.  Labs unremarkable as above.  Hemoglobin stable.  Reticulocyte count not repeated as it was just performed yesterday.    Patient given lactated Ringer's, Zofran, morphine, Toradol, per her ED pain management plan.  After her third dose of morphine, patient requested discharge home.  Advised close follow-up with hematology clinic.  Detailed return precautions provided.      I have reviewed the nursing notes. I have reviewed the findings, diagnosis, plan and need for follow up with the patient.    Discharge Medication List as of 1/15/2021 12:10 AM          Final diagnoses:   Sickle cell pain crisis (H)   IAshley, am serving as a trained medical scribe to document services personally performed by Jackie Dale MD, based on the provider's statements to me.     I, Jackie Dale MD, was physically present and have reviewed and verified the accuracy of this  note documented by Ashley Johnson.      --  Jackie Dale MD  [facility] EMERGENCY DEPARTMENT  1/14/2021     Jackie Dale MD  01/15/21 0147

## 2021-01-15 NOTE — DISCHARGE INSTRUCTIONS
Please make an appointment to follow up with Hematology Oncology Clinic (phone: [phone]) as soon as possible.    In to the emergency department for worsening pain, fever, shortness of breath or chest pain, dehydration.

## 2021-01-15 NOTE — PROGRESS NOTES
Sickle Cell Outpatient Follow Up Visit      Date of encounter: Rigo 15, 2021    Jennifer Cervantes is a 20 year old female who is being seen in clinic visit.      Interval History: Jennifer has been able to stay out of the hospital for several months, though she has been to the ED 3-4 times this week. She said there have been some stressors at home, though details were not provided. She did say her mom was really supportive of the fact that she has been able to avoid admissions. This week, Jennifer said she has been treated overall very well in the ED. There is a nurse (Arely PIERCE, per Jennifer's recall) who has really taken Jennifer under her care as much as possible when she comes in, which Jennifer really appreciates. She did say that she had a less than optimal experience the night before this visit, because this time she did feel more ignored. She said that she has some urinary incontinence after her strokes and no one would answer her or come help for 20-30 minutes. She did ultimately get cleaned up but she was disappointed that no one came to help, even though she tends to come in the early morning hours to avoid long waits. Otherwise, Jennifer has not had any cough, fever, chest pain, dyspnea, or GI symptoms. She is taking her Jadenu.    History of Present Illness:  (Initial visit remarks from Dr. 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 She also has significant anxiety and depression with a strong anxiety about transferring to the adult clinic. She usually has pain crises secondary to the anxiety.      --------------------------------  Plan last reviewed with patient: 1/16/2021    Patient background: 21yo F, enjoys movies and kids though there are  times where she does not really want to talk to people. Does not have a lot of social support at home.     Sickle Cell Disease History  Primary Hematologist: Perla  PCP: Raul  Genotype: SS  Acute Pain Crisis Treatment:    ER/Acute Care/Infusion Clinic:   o Morphine 2 mg IVP/SC Q1H X 3 doses  o Toradol x1  o Maintenance IV fluids with LR  o Other: Benadryl PO and Zofran 8 mg IV PRN itching or nausea    Inpatient:  o Opioid: Morphine 2 mg IV Q1H PRN until PCA starts  o PCA plan:   - PCA button dose: Morphine 1 mg  - Lockout: 20 minutes  - Continuous Infusion: consider 1 mg/hr  - One hr max: 4 mg  o Other Medications: Benadryl, Zofran  o If PCA not working, she Has had success with ketamine starting at 4mg/h and advancing only to 6mg/h, as 8mg/h made her feel quite poorly.  o Continue ASA (ok to give celebrex)--discuss Toradol with hematology first  o Supportive Care: Docusate Senna  Chronic Pain Medications:    Home regimen  oxycodone 10 mg p.o. q.6 hours p.r.n. breakthrough pain.  She also continues on Celebrex, and Zoloft among other medications.    -Also benefits from mental health visits, acupuncture  Baseline Hemoglobin: 9.5 g/dL (on chronic transfusions), 7-8 without  Hydroxyurea use: Yes  H/O blood transfusions: Yes, several (iron overload) Most recent 5/22/20    H/O Transfusion Reactions: no    Antibodies:none  H/O Acute Chest Syndrome: Yes    Last episode: 10/2019     ICU/intubation: unsure  H/O Stroke: Yes (managed with chronic transfusions in the past)  H/O VTE: no  H/O Cholecystectomy or Splenectomy: no  H/O Asthma, Pulm HTN, AVN, Leg Ulcers, Nephropathy, Retinopathy, etc: Iron overload, asthma, chronic lung disease, developmental delay from early stroke    -------------------------------------------    Sickle Cell Disease Comprehensive Checklist    Bone Health/Avascular Necrosis Screening/Symptoms (each visit): no new concerns today    Leg Ulcer evaluation (every visit): none    Hypertension  (every visit): mildly hypertensive today, unclear whether it is anxiety about being in clinic or more than that    Last ophthalmologic exam: 7/29/20    Last urinalysis for microalbuminuria/CKD (annually): within last year    Last pulmonary evaluation (asthma, AMAN, pulm HTN): within last year    Stroke/silent cerebral infarct Hx (Y/N): Yes TIA ~2014, first event ~age 1 with full stroke and R sided weakness    Last PCP Visit: 8/2020    Vaccines:  o PCV13: 5/13/19  o Pneumovax (PPSV23): 3/04, 10/09, 7/12/19 (next due 7/2024)  o Menactra: 4/2010, 9/2015 (MCV due Sept 2020)  o Influenza: got 19-20 vaccine    Audiology (chelation): done 6/2020, normal    Past Medical History:  Past Medical History:   Diagnosis Date     Anemia      Anxiety      Bleeding disorder (H)      Blood clotting disorder (H)      Cerebral infarction (H) 2015     Cognitive developmental delay     low IQ. 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 (H)     right hand contractures     Iron overload due to repeated red blood cell transfusions      Migraines      Oppositional defiant behavior      Uncomplicated asthma        Past Surgical History:  Past Surgical History:   Procedure Laterality Date     AS INSERT TUNNELED CV 2 CATH W/O PORT/PUMP       C BREAST REDUCTION (INCLUDES LIPO) TIER 3 Bilateral 04/23/2019     IR CVC NON TUNNEL PLACEMENT  4/7/2020     REPAIR TENDON ELBOW Right 10/2/2019    Procedure: Right Elbow Flexor Lengthening, Flexor Pronator Slide Of Wrist and Finger, Thumb Adductor Lengthening;  Surgeon: Anai Franco MD;  Location: [facility] OR     TONSILLECTOMY Bilateral 10/2/2019    Procedure: Bilateral Tonsillectomy;  Surgeon: Farhana Guy MD;  Location: [facility] OR       Family History:   Family History   Problem Relation Age of Onset     Sickle Cell Trait Mother      Sickle Cell Trait Father   "      Social History:  Social History     Socioeconomic History     Marital status: Single     Spouse name: Not on file     Number of children: Not on file     Years of education: Not on file     Highest education level: Not on file   Occupational History     Not on file   Social Needs     Financial resource strain: Not on file     Food insecurity     Worry: Not on file     Inability: Not on file     Transportation needs     Medical: Not on file     Non-medical: Not on file   Tobacco Use     Smoking status: Never Smoker     Smokeless tobacco: Never Used   Substance and Sexual Activity     Alcohol use: Not Currently     Alcohol/week: 0.0 standard drinks     Drug use: Never     Sexual activity: Not Currently     Partners: Male     Birth control/protection: Other   Lifestyle     Physical activity     Days per week: Not on file     Minutes per session: Not on file     Stress: Not on file   Relationships     Social connections     Talks on phone: Not on file     Gets together: Not on file     Attends Jewish service: Not on file     Active member of club or organization: Not on file     Attends meetings of clubs or organizations: Not on file     Relationship status: Not on file     Intimate partner violence     Fear of current or ex partner: Not on file     Emotionally abused: Not on file     Physically abused: Not on file     Forced sexual activity: Not on file   Other Topics Concern     Parent/sibling w/ CABG, MI or angioplasty before 65F 55M? Not Asked   Social History Narrative    Lives with mom and extended family but \"toxic environment\" per her report. She would like to move out but cannot financially do so. She has minimal support at home despite her significant SCD comorbidities and cognitive delay from stroke.       Medications:  Current Outpatient Medications   Medication     acetaminophen (TYLENOL) 325 MG tablet     albuterol (PROAIR HFA/PROVENTIL HFA/VENTOLIN HFA) 108 (90 Base) MCG/ACT inhaler     albuterol " "(PROVENTIL) (2.5 MG/3ML) 0.083% neb solution     aspirin (ASPIRIN) 81 MG EC tablet     budesonide-formoterol (SYMBICORT) 160-4.5 MCG/ACT Inhaler     celecoxib (CELEBREX) 100 MG capsule     diphenhydrAMINE (BENADRYL) 25 MG capsule     hydroxyurea (HYDREA) 500 MG capsule     Hydroxyurea 1000 MG TABS     ibuprofen (ADVIL/MOTRIN) 200 MG tablet     JADENU 360 MG tablet     medroxyPROGESTERone (DEPO-PROVERA) 150 MG/ML IM injection     naloxone (NARCAN) 4 MG/0.1ML nasal spray     ondansetron (ZOFRAN) 8 MG tablet     oxyCODONE IR (ROXICODONE) 10 MG tablet     sertraline (ZOLOFT) 100 MG tablet     No current facility-administered medications for this visit.          Physical Exam:   /81 (BP Location: Left arm, Patient Position: Sitting, Cuff Size: Adult Regular)   Pulse 103   Temp 98.9  F (37.2  C) (Tympanic)   Resp 16   Ht 1.626 m (5' 4\")   Wt 73 kg (161 lb)   SpO2 98%   BMI 27.64 kg/m       GEN: young  female, feeling well today  HEENT: full head of hair, no icterus, MMM  NECK: no visible asymmetry  RESP: speaking in full sentences, no increased work of breathing  MSK: brace on right wrist and right ankle (AFO). Contracture of right wrist  NEURO: alert and oriented  SKIN: port site c/d/i     Labs:   Results for HIMANSHU AL (MRN [id number]) as of 11/22/2020 21:56  Results for HIMANSHU AL (MRN [id number]) as of 1/18/2021 00:07   Ref. Range 1/12/2021 18:42 1/13/2021 15:44 1/13/2021 23:21 1/14/2021 21:28   Sodium Latest Ref Range: 133 - 144 mmol/L 138 139 141 139   Potassium Latest Ref Range: 3.4 - 5.3 mmol/L 3.9 3.7 3.7 4.3   Chloride Latest Ref Range: 94 - 109 mmol/L 108 109 108 111 (H)   Carbon Dioxide Latest Ref Range: 20 - 32 mmol/L 24 24 24 22   Urea Nitrogen Latest Ref Range: 7 - 30 mg/dL 19 14 11 15   Creatinine Latest Ref Range: 0.52 - 1.04 mg/dL 0.66 0.51 (L) 0.65 0.63   GFR Estimate Latest Ref Range: >60 mL/min/1.73_m2 >90 >90 >90 >90   GFR Estimate If Black Latest Ref Range: >60 " mL/min/1.73_m2 >90 >90 >90 >90   Calcium Latest Ref Range: 8.5 - 10.1 mg/dL 8.9 8.7 9.0 8.8   Anion Gap Latest Ref Range: 3 - 14 mmol/L 6 6 9 6   Albumin Latest Ref Range: 3.4 - 5.0 g/dL 4.1 4.1 4.1    Protein Total Latest Ref Range: 6.8 - 8.8 g/dL 8.0 7.8 7.8    Bilirubin Total Latest Ref Range: 0.2 - 1.3 mg/dL 3.2 (H) 3.3 (H) 3.1 (H)    Alkaline Phosphatase Latest Ref Range: 40 - 150 U/L 70 68 71    ALT Latest Ref Range: 0 - 50 U/L 84 (H) 84 (H) 85 (H)    AST Latest Ref Range: 0 - 45 U/L 104 (H) 108 (H) 110 (H)    Ferritin Latest Ref Range: 12 - 150 ng/mL  10,926 (H)     HCG Qualitative Serum Latest Ref Range: NEG^Negative    Negative    Glucose Latest Ref Range: 70 - 99 mg/dL 93 90 99 82   WBC Latest Ref Range: 4.0 - 11.0 10e9/L 12.6 (H) 10.3 12.8 (H) 13.5 (H)   Hemoglobin Latest Ref Range: 11.7 - 15.7 g/dL 8.4 (L) 8.1 (L) 7.5 (L) 7.6 (L)   Hematocrit Latest Ref Range: 35.0 - 47.0 % 23.3 (L) 22.5 (L) 20.8 (L) 20.9 (L)   Platelet Count Latest Ref Range: 150 - 450 10e9/L 288 285 317 346   RBC Count Latest Ref Range: 3.8 - 5.2 10e12/L 2.56 (L) 2.51 (L) 2.37 (L) 2.36 (L)   MCV Latest Ref Range: 78 - 100 fl 91 90 88 89   MCH Latest Ref Range: 26.5 - 33.0 pg 32.8 32.3 31.6 32.2   MCHC Latest Ref Range: 31.5 - 36.5 g/dL 36.1 36.0 36.1 36.4   RDW Latest Ref Range: 10.0 - 15.0 % 19.3 (H) 19.6 (H) 19.7 (H) 21.2 (H)   Diff Method Unknown Automated Method Automated Method Automated Method Automated Method   % Neutrophils Latest Units: % 61.3 57.9 54.4 60.0   % Lymphocytes Latest Units: % 23.0 23.2 27.0 25.0   % Monocytes Latest Units: % 9.0 9.9 9.9 7.6   % Eosinophils Latest Units: % 4.6 6.7 6.1 4.7   % Basophils Latest Units: % 1.6 1.8 2.1 2.2   % Immature Granulocytes Latest Units: % 0.5 0.5 0.5 0.5   Nucleated RBCs Latest Ref Range: 0 /100 1 (H) 1 (H) 1 (H) 2 (H)   Absolute Neutrophil Latest Ref Range: 1.6 - 8.3 10e9/L 7.7 5.9 7.0 8.1   Absolute Lymphocytes Latest Ref Range: 0.8 - 5.3 10e9/L 2.9 2.4 3.4 3.4   Absolute  Monocytes Latest Ref Range: 0.0 - 1.3 10e9/L 1.1 1.0 1.3 1.0   Absolute Eosinophils Latest Ref Range: 0.0 - 0.7 10e9/L 0.6 0.7 0.8 (H) 0.6   Absolute Basophils Latest Ref Range: 0.0 - 0.2 10e9/L 0.2 0.2 0.3 (H) 0.3 (H)   Abs Immature Granulocytes Latest Ref Range: 0 - 0.4 10e9/L 0.1 0.1 0.1 0.1   Absolute Nucleated RBC Unknown 0.1 0.1 0.2 0.2   % Retic Latest Ref Range: 0.5 - 2.0 % 11.1 (H) 14.0 (H) 12.7 (H)    Absolute Retic Latest Ref Range: 25 - 95 10e9/L 284.2 (H) 353.2 (H) 300.3 (H)    Hepatitis C Antibody Latest Ref Range: NR^Nonreactive   Nonreactive         Imaging: none today    Assessment:  Jennifer Cervantes is a 20 year old female with HbSS. Her disease has been complicated by stroke leading to significant cognitive delay and right sided hemiparesis, high anxiety leading to frequent pain crises on top of chronic pain syndrome, poor social structure at home with no real support, and iron overload secondary to repeated transfusions.     Major Topics of the Visit:  1. Pain Management: We discussed our current strategy. She has weaned off oxycontin by her own request. She has had more pain this week but denies that it is due to oxycontin and said there were more stressorts at home. Otherwise, she is back to her monthly refills for the most part.  2. Iron Overload/Pharmacy Issues: She is now on chelation  3. High RBC turnover: Jennifer really has a high degree of RBC turnover, more than most patients I have seen. Oxybryta led to more frequent pain episodes (chest pain, which was new) and did not change the RBC turnover so it was stopped in Decemberl.  4. Refills: None today but will probably need oxycodone soon.  5. Follow up: 4 weeks with ANDREAS.     I spent a total of 26 minutes face-to-face with Jennifer Cervantes during today's office visit. Over 50% of the time was spent discussing how we may work to improve the pharmacy issues and counseling on the pros and cons to our current pain management strategies so she could  understand. See note for details.    Eric Duncan MD  Hematologist  Division of Hematology, Oncology, and Transplantation  [facility]  [facility]  Pager: [phone]

## 2021-01-15 NOTE — LETTER
1/15/2021         RE: Jennifer Cervantes  [address]  [facility] [address]        Dear Colleague,    Thank you for referring your patient, Jennifer Cervantes, to the [facility]. Please see a copy of my visit note below.    Sickle Cell Outpatient Follow Up Visit      Date of encounter: Rigo 15, 2021    Jennifer Cervantes is a 20 year old female who is being seen in clinic visit.      Interval History: Jennifer has been able to stay out of the hospital for several months, though she has been to the ED 3-4 times this week. She said there have been some stressors at home, though details were not provided. She did say her mom was really supportive of the fact that she has been able to avoid admissions. This week, Jennifer said she has been treated overall very well in the ED. There is a nurse (Arely PIERCE, per Jennifer's recall) who has really taken Jennifer under her care as much as possible when she comes in, which Jennifer really appreciates. She did say that she had a less than optimal experience the night before this visit, because this time she did feel more ignored. She said that she has some urinary incontinence after her strokes and no one would answer her or come help for 20-30 minutes. She did ultimately get cleaned up but she was disappointed that no one came to help, even though she tends to come in the early morning hours to avoid long waits. Otherwise, Jennifer has not had any cough, fever, chest pain, dyspnea, or GI symptoms. She is taking her Jadenu.    History of Present Illness:  (Initial visit remarks from Dr. 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 She also has significant anxiety and depression with a strong  anxiety about transferring to the adult clinic. She usually has pain crises secondary to the anxiety.      --------------------------------  Plan last reviewed with patient: 1/16/2021    Patient background: 21yo F, enjoys movies and kids though there are times where she does not really want to talk to people. Does not have a lot of social support at home.     Sickle Cell Disease History  Primary Hematologist: Perla  PCP: Raul  Genotype: SS  Acute Pain Crisis Treatment:    ER/Acute Care/Infusion Clinic:   o Morphine 2 mg IVP/SC Q1H X 3 doses  o Toradol x1  o Maintenance IV fluids with LR  o Other: Benadryl PO and Zofran 8 mg IV PRN itching or nausea    Inpatient:  o Opioid: Morphine 2 mg IV Q1H PRN until PCA starts  o PCA plan:   - PCA button dose: Morphine 1 mg  - Lockout: 20 minutes  - Continuous Infusion: consider 1 mg/hr  - One hr max: 4 mg  o Other Medications: Benadryl, Zofran  o If PCA not working, she Has had success with ketamine starting at 4mg/h and advancing only to 6mg/h, as 8mg/h made her feel quite poorly.  o Continue ASA (ok to give celebrex)--discuss Toradol with hematology first  o Supportive Care: Docusate, Senna  Chronic Pain Medications:    Home regimen  oxycodone 10 mg p.o. q.6 hours p.r.n. breakthrough pain.  She also continues on Celebrex, and Zoloft among other medications.    -Also benefits from mental health visits, acupuncture  Baseline Hemoglobin: 9.5 g/dL (on chronic transfusions), 7-8 without  Hydroxyurea use: Yes  H/O blood transfusions: Yes, several (iron overload) Most recent 5/22/20    H/O Transfusion Reactions: no    Antibodies:none  H/O Acute Chest Syndrome: Yes    Last episode: 10/2019     ICU/intubation: unsure  H/O Stroke: Yes (managed with chronic transfusions in the past)  H/O VTE: no  H/O Cholecystectomy or Splenectomy: no  H/O Asthma, Pulm HTN, AVN, Leg Ulcers, Nephropathy, Retinopathy, etc: Iron overload, asthma, chronic lung disease, developmental delay from early  stroke    -------------------------------------------    Sickle Cell Disease Comprehensive Checklist    Bone Health/Avascular Necrosis Screening/Symptoms (each visit): no new concerns today    Leg Ulcer evaluation (every visit): none    Hypertension (every visit): mildly hypertensive today, unclear whether it is anxiety about being in clinic or more than that    Last ophthalmologic exam: 7/29/20    Last urinalysis for microalbuminuria/CKD (annually): within last year    Last pulmonary evaluation (asthma, AMAN, pulm HTN): within last year    Stroke/silent cerebral infarct Hx (Y/N): Yes TIA ~2014, first event ~age 1 with full stroke and R sided weakness    Last PCP Visit: 8/2020    Vaccines:  o PCV13: 5/13/19  o Pneumovax (PPSV23): 3/04, 10/09, 7/12/19 (next due 7/2024)  o Menactra: 4/2010, 9/2015 (MCV due Sept 2020)  o Influenza: got 19-20 vaccine    Audiology (chelation): done 6/2020, normal    Past Medical History:  Past Medical History:   Diagnosis Date     Anemia      Anxiety      Bleeding disorder (H)      Blood clotting disorder (H)      Cerebral infarction (H) 2015     Cognitive developmental delay     low IQ. 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 (H)     right hand contractures     Iron overload due to repeated red blood cell transfusions      Migraines      Oppositional defiant behavior      Uncomplicated asthma        Past Surgical History:  Past Surgical History:   Procedure Laterality Date     AS INSERT TUNNELED CV 2 CATH W/O PORT/PUMP       C BREAST REDUCTION (INCLUDES LIPO) TIER 3 Bilateral 04/23/2019     IR CVC NON TUNNEL PLACEMENT  4/7/2020     REPAIR TENDON ELBOW Right 10/2/2019    Procedure: Right Elbow Flexor Lengthening, Flexor Pronator Slide Of Wrist and Finger, Thumb Adductor Lengthening;  Surgeon: Anai Franco MD;  Location: [facility] OR     TONSILLECTOMY  "Bilateral 10/2/2019    Procedure: Bilateral Tonsillectomy;  Surgeon: Farhana Guy MD;  Location: [facility] OR       Family History:   Family History   Problem Relation Age of Onset     Sickle Cell Trait Mother      Sickle Cell Trait Father        Social History:  Social History     Socioeconomic History     Marital status: Single     Spouse name: Not on file     Number of children: Not on file     Years of education: Not on file     Highest education level: Not on file   Occupational History     Not on file   Social Needs     Financial resource strain: Not on file     Food insecurity     Worry: Not on file     Inability: Not on file     Transportation needs     Medical: Not on file     Non-medical: Not on file   Tobacco Use     Smoking status: Never Smoker     Smokeless tobacco: Never Used   Substance and Sexual Activity     Alcohol use: Not Currently     Alcohol/week: 0.0 standard drinks     Drug use: Never     Sexual activity: Not Currently     Partners: Male     Birth control/protection: Other   Lifestyle     Physical activity     Days per week: Not on file     Minutes per session: Not on file     Stress: Not on file   Relationships     Social connections     Talks on phone: Not on file     Gets together: Not on file     Attends Zoroastrian service: Not on file     Active member of club or organization: Not on file     Attends meetings of clubs or organizations: Not on file     Relationship status: Not on file     Intimate partner violence     Fear of current or ex partner: Not on file     Emotionally abused: Not on file     Physically abused: Not on file     Forced sexual activity: Not on file   Other Topics Concern     Parent/sibling w/ CABG, MI or angioplasty before 65F 55M? Not Asked   Social History Narrative    Lives with mom and extended family but \"toxic environment\" per her report. She would like to move out but cannot financially do so. She has minimal support at home despite her significant SCD " "comorbidities and cognitive delay from stroke.       Medications:  Current Outpatient Medications   Medication     acetaminophen (TYLENOL) 325 MG tablet     albuterol (PROAIR HFA/PROVENTIL HFA/VENTOLIN HFA) 108 (90 Base) MCG/ACT inhaler     albuterol (PROVENTIL) (2.5 MG/3ML) 0.083% neb solution     aspirin (ASPIRIN) 81 MG EC tablet     budesonide-formoterol (SYMBICORT) 160-4.5 MCG/ACT Inhaler     celecoxib (CELEBREX) 100 MG capsule     diphenhydrAMINE (BENADRYL) 25 MG capsule     hydroxyurea (HYDREA) 500 MG capsule     Hydroxyurea 1000 MG TABS     ibuprofen (ADVIL/MOTRIN) 200 MG tablet     JADENU 360 MG tablet     medroxyPROGESTERone (DEPO-PROVERA) 150 MG/ML IM injection     naloxone (NARCAN) 4 MG/0.1ML nasal spray     ondansetron (ZOFRAN) 8 MG tablet     oxyCODONE IR (ROXICODONE) 10 MG tablet     sertraline (ZOLOFT) 100 MG tablet     No current facility-administered medications for this visit.          Physical Exam:   /81 (BP Location: Left arm, Patient Position: Sitting, Cuff Size: Adult Regular)   Pulse 103   Temp 98.9  F (37.2  C) (Tympanic)   Resp 16   Ht 1.626 m (5' 4\")   Wt 73 kg (161 lb)   SpO2 98%   BMI 27.64 kg/m       GEN: young  female, feeling well today  HEENT: full head of hair, no icterus, MMM  NECK: no visible asymmetry  RESP: speaking in full sentences, no increased work of breathing  MSK: brace on right wrist and right ankle (AFO). Contracture of right wrist  NEURO: alert and oriented  SKIN: port site c/d/i     Labs:   Results for HIMANSHU AL (MRN [id number]) as of 11/22/2020 21:56  Results for HIMANSHU AL (MRN [id number]) as of 1/18/2021 00:07   Ref. Range 1/12/2021 18:42 1/13/2021 15:44 1/13/2021 23:21 1/14/2021 21:28   Sodium Latest Ref Range: 133 - 144 mmol/L 138 139 141 139   Potassium Latest Ref Range: 3.4 - 5.3 mmol/L 3.9 3.7 3.7 4.3   Chloride Latest Ref Range: 94 - 109 mmol/L 108 109 108 111 (H)   Carbon Dioxide Latest Ref Range: 20 - 32 mmol/L 24 24 24 " 22   Urea Nitrogen Latest Ref Range: 7 - 30 mg/dL 19 14 11 15   Creatinine Latest Ref Range: 0.52 - 1.04 mg/dL 0.66 0.51 (L) 0.65 0.63   GFR Estimate Latest Ref Range: >60 mL/min/1.73_m2 >90 >90 >90 >90   GFR Estimate If Black Latest Ref Range: >60 mL/min/1.73_m2 >90 >90 >90 >90   Calcium Latest Ref Range: 8.5 - 10.1 mg/dL 8.9 8.7 9.0 8.8   Anion Gap Latest Ref Range: 3 - 14 mmol/L 6 6 9 6   Albumin Latest Ref Range: 3.4 - 5.0 g/dL 4.1 4.1 4.1    Protein Total Latest Ref Range: 6.8 - 8.8 g/dL 8.0 7.8 7.8    Bilirubin Total Latest Ref Range: 0.2 - 1.3 mg/dL 3.2 (H) 3.3 (H) 3.1 (H)    Alkaline Phosphatase Latest Ref Range: 40 - 150 U/L 70 68 71    ALT Latest Ref Range: 0 - 50 U/L 84 (H) 84 (H) 85 (H)    AST Latest Ref Range: 0 - 45 U/L 104 (H) 108 (H) 110 (H)    Ferritin Latest Ref Range: 12 - 150 ng/mL  10,926 (H)     HCG Qualitative Serum Latest Ref Range: NEG^Negative    Negative    Glucose Latest Ref Range: 70 - 99 mg/dL 93 90 99 82   WBC Latest Ref Range: 4.0 - 11.0 10e9/L 12.6 (H) 10.3 12.8 (H) 13.5 (H)   Hemoglobin Latest Ref Range: 11.7 - 15.7 g/dL 8.4 (L) 8.1 (L) 7.5 (L) 7.6 (L)   Hematocrit Latest Ref Range: 35.0 - 47.0 % 23.3 (L) 22.5 (L) 20.8 (L) 20.9 (L)   Platelet Count Latest Ref Range: 150 - 450 10e9/L 288 285 317 346   RBC Count Latest Ref Range: 3.8 - 5.2 10e12/L 2.56 (L) 2.51 (L) 2.37 (L) 2.36 (L)   MCV Latest Ref Range: 78 - 100 fl 91 90 88 89   MCH Latest Ref Range: 26.5 - 33.0 pg 32.8 32.3 31.6 32.2   MCHC Latest Ref Range: 31.5 - 36.5 g/dL 36.1 36.0 36.1 36.4   RDW Latest Ref Range: 10.0 - 15.0 % 19.3 (H) 19.6 (H) 19.7 (H) 21.2 (H)   Diff Method Unknown Automated Method Automated Method Automated Method Automated Method   % Neutrophils Latest Units: % 61.3 57.9 54.4 60.0   % Lymphocytes Latest Units: % 23.0 23.2 27.0 25.0   % Monocytes Latest Units: % 9.0 9.9 9.9 7.6   % Eosinophils Latest Units: % 4.6 6.7 6.1 4.7   % Basophils Latest Units: % 1.6 1.8 2.1 2.2   % Immature Granulocytes Latest  Units: % 0.5 0.5 0.5 0.5   Nucleated RBCs Latest Ref Range: 0 /100 1 (H) 1 (H) 1 (H) 2 (H)   Absolute Neutrophil Latest Ref Range: 1.6 - 8.3 10e9/L 7.7 5.9 7.0 8.1   Absolute Lymphocytes Latest Ref Range: 0.8 - 5.3 10e9/L 2.9 2.4 3.4 3.4   Absolute Monocytes Latest Ref Range: 0.0 - 1.3 10e9/L 1.1 1.0 1.3 1.0   Absolute Eosinophils Latest Ref Range: 0.0 - 0.7 10e9/L 0.6 0.7 0.8 (H) 0.6   Absolute Basophils Latest Ref Range: 0.0 - 0.2 10e9/L 0.2 0.2 0.3 (H) 0.3 (H)   Abs Immature Granulocytes Latest Ref Range: 0 - 0.4 10e9/L 0.1 0.1 0.1 0.1   Absolute Nucleated RBC Unknown 0.1 0.1 0.2 0.2   % Retic Latest Ref Range: 0.5 - 2.0 % 11.1 (H) 14.0 (H) 12.7 (H)    Absolute Retic Latest Ref Range: 25 - 95 10e9/L 284.2 (H) 353.2 (H) 300.3 (H)    Hepatitis C Antibody Latest Ref Range: NR^Nonreactive   Nonreactive         Imaging: none today    Assessment:  Jennifer Cervantes is a 20 year old female with HbSS. Her disease has been complicated by stroke leading to significant cognitive delay and right sided hemiparesis, high anxiety leading to frequent pain crises on top of chronic pain syndrome, poor social structure at home with no real support, and iron overload secondary to repeated transfusions.     Major Topics of the Visit:  1. Pain Management: We discussed our current strategy. She has weaned off oxycontin by her own request. She has had more pain this week but denies that it is due to oxycontin and said there were more stressorts at home. Otherwise, she is back to her monthly refills for the most part.  2. Iron Overload/Pharmacy Issues: She is now on chelation  3. High RBC turnover: Jennifer really has a high degree of RBC turnover, more than most patients I have seen. Oxybryta led to more frequent pain episodes (chest pain, which was new) and did not change the RBC turnover so it was stopped in Decemberl.  4. Refills: None today but will probably need oxycodone soon.  5. Follow up: 4 weeks with ANDREAS.     I spent a total of 26  minutes face-to-face with Jennifer Cervantes during today's office visit. Over 50% of the time was spent discussing how we may work to improve the pharmacy issues and counseling on the pros and cons to our current pain management strategies so she could understand. See note for details.    Eric Duncan MD  Hematologist  Division of Hematology, Oncology, and Transplantation  [facility]  [facility]  Pager: [phone]

## 2021-01-15 NOTE — NURSING NOTE
"Oncology Rooming Note    January 15, 2021 3:36 PM   Jennifer Cervantes is a 20 year old female who presents for:    Chief Complaint   Patient presents with     Oncology Clinic Visit     Return: Sickle cell      Initial Vitals: /81 (BP Location: Left arm, Patient Position: Sitting, Cuff Size: Adult Regular)   Pulse 103   Temp 98.9  F (37.2  C) (Tympanic)   Resp 16   Ht 1.626 m (5' 4\")   Wt 73 kg (161 lb)   SpO2 98%   BMI 27.64 kg/m   Estimated body mass index is 27.64 kg/m  as calculated from the following:    Height as of this encounter: 1.626 m (5' 4\").    Weight as of this encounter: 73 kg (161 lb). Body surface area is 1.82 meters squared.  No Pain (0) Comment: Data Unavailable   No LMP recorded. Patient has had an injection.  Allergies reviewed: Yes  Medications reviewed: Yes    Medications: Medication refills not needed today.  Pharmacy name entered into EPIC:    [facility] - [place], [place] - [address]  [id number] - [facility] [address] OF [facility] [place] [phone]  [id number] - [facility] [address] [id number] [address] AT [address] OF [address] [facility] - [place], [place] - [address]    Clinical concerns: N/A     Shavonne Guerrero CMA              "

## 2021-01-15 NOTE — ED NOTES
RN is informed that patient has had call light on, Rn enters patient room and patient is yelling that she needs help. Patient has urinated on herself and states that she needs clothes and that she is ready to go home. RN helps patient get cleaned up and offers a clean gown while RN finds dry clothes for patient. Patient is tearful and yelling to nurse that she has been calling for help to go to the bathroom. Rn explains that RN was getting her pain medications prior to coming into the room as they were almost due. Patient verbalizes that she cannot hold her urine after having a stroke and needs help immediately. Rn provides patient with pants to go home in and informs physician that patient is ready to go home.

## 2021-01-15 NOTE — LETTER
1/15/2021         RE: Jennifer Cervantes  [address]  [facility] [address]      Sickle Cell Outpatient Follow Up Visit      Date of encounter: Rigo 15, 2021    Jennifer Cervantes is a 20 year old female who is being seen in clinic visit.      Interval History: Jennifer has been able to stay out of the hospital for several months, though she has been to the ED 3-4 times this week. She said there have been some stressors at home, though details were not provided. She did say her mom was really supportive of the fact that she has been able to avoid admissions. This week, Jennifer said she has been treated overall very well in the ED. There is a nurse (Arely PIERCE, per Jennifer's recall) who has really taken Jennifer under her care as much as possible when she comes in, which Jennifer really appreciates. She did say that she had a less than optimal experience the night before this visit, because this time she did feel more ignored. She said that she has some urinary incontinence after her strokes and no one would answer her or come help for 20-30 minutes. She did ultimately get cleaned up but she was disappointed that no one came to help, even though she tends to come in the early morning hours to avoid long waits. Otherwise, Jennifer has not had any cough, fever, chest pain, dyspnea, or GI symptoms. She is taking her Jadenu.    History of Present Illness:  (Initial visit remarks from Dr. 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 She also has significant anxiety and depression with a strong anxiety about transferring to the adult clinic. She usually has pain crises secondary to the anxiety.      --------------------------------  Plan last reviewed with  patient: 1/16/2021    Patient background: 19yo F, enjoys movies and kids though there are times where she does not really want to talk to people. Does not have a lot of social support at home.     Sickle Cell Disease History  Primary Hematologist: Perla  PCP: Raul  Genotype: SS  Acute Pain Crisis Treatment:    ER/Acute Care/Infusion Clinic:   o Morphine 2 mg IVP/SC Q1H X 3 doses  o Toradol x1  o Maintenance IV fluids with LR  o Other: Benadryl PO and Zofran 8 mg IV PRN itching or nausea    Inpatient:  o Opioid: Morphine 2 mg IV Q1H PRN until PCA starts  o PCA plan:   - PCA button dose: Morphine 1 mg  - Lockout: 20 minutes  - Continuous Infusion: consider 1 mg/hr  - One hr max: 4 mg  o Other Medications: Benadryl, Zofran  o If PCA not working, she Has had success with ketamine starting at 4mg/h and advancing only to 6mg/h, as 8mg/h made her feel quite poorly.  o Continue ASA (ok to give celebrex)--discuss Toradol with hematology first  o Supportive Care: Docusate, Senna  Chronic Pain Medications:    Home regimen  oxycodone 10 mg p.o. q.6 hours p.r.n. breakthrough pain.  She also continues on Celebrex, and Zoloft among other medications.    -Also benefits from mental health visits, acupuncture  Baseline Hemoglobin: 9.5 g/dL (on chronic transfusions), 7-8 without  Hydroxyurea use: Yes  H/O blood transfusions: Yes, several (iron overload) Most recent 5/22/20    H/O Transfusion Reactions: no    Antibodies:none  H/O Acute Chest Syndrome: Yes    Last episode: 10/2019     ICU/intubation: unsure  H/O Stroke: Yes (managed with chronic transfusions in the past)  H/O VTE: no  H/O Cholecystectomy or Splenectomy: no  H/O Asthma, Pulm HTN, AVN, Leg Ulcers, Nephropathy, Retinopathy, etc: Iron overload, asthma, chronic lung disease, developmental delay from early stroke    -------------------------------------------    Sickle Cell Disease Comprehensive Checklist    Bone Health/Avascular Necrosis Screening/Symptoms (each  visit): no new concerns today    Leg Ulcer evaluation (every visit): none    Hypertension (every visit): mildly hypertensive today, unclear whether it is anxiety about being in clinic or more than that    Last ophthalmologic exam: 7/29/20    Last urinalysis for microalbuminuria/CKD (annually): within last year    Last pulmonary evaluation (asthma, AMAN, pulm HTN): within last year    Stroke/silent cerebral infarct Hx (Y/N): Yes TIA ~2014, first event ~age 1 with full stroke and R sided weakness    Last PCP Visit: 8/2020    Vaccines:  o PCV13: 5/13/19  o Pneumovax (PPSV23): 3/04, 10/09, 7/12/19 (next due 7/2024)  o Menactra: 4/2010, 9/2015 (MCV due Sept 2020)  o Influenza: got 19-20 vaccine    Audiology (chelation): done 6/2020, normal    Past Medical History:  Past Medical History:   Diagnosis Date     Anemia      Anxiety      Bleeding disorder (H)      Blood clotting disorder (H)      Cerebral infarction (H) 2015     Cognitive developmental delay     low IQ. 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 (H)     right hand contractures     Iron overload due to repeated red blood cell transfusions      Migraines      Oppositional defiant behavior      Uncomplicated asthma        Past Surgical History:  Past Surgical History:   Procedure Laterality Date     AS INSERT TUNNELED CV 2 CATH W/O PORT/PUMP       C BREAST REDUCTION (INCLUDES LIPO) TIER 3 Bilateral 04/23/2019     IR CVC NON TUNNEL PLACEMENT  4/7/2020     REPAIR TENDON ELBOW Right 10/2/2019    Procedure: Right Elbow Flexor Lengthening, Flexor Pronator Slide Of Wrist and Finger, Thumb Adductor Lengthening;  Surgeon: Anai Franco MD;  Location: [facility] OR     TONSILLECTOMY Bilateral 10/2/2019    Procedure: Bilateral Tonsillectomy;  Surgeon: Farhana Guy MD;  Location: [facility] OR       Family History:   Family History  "  Problem Relation Age of Onset     Sickle Cell Trait Mother      Sickle Cell Trait Father        Social History:  Social History     Socioeconomic History     Marital status: Single     Spouse name: Not on file     Number of children: Not on file     Years of education: Not on file     Highest education level: Not on file   Occupational History     Not on file   Social Needs     Financial resource strain: Not on file     Food insecurity     Worry: Not on file     Inability: Not on file     Transportation needs     Medical: Not on file     Non-medical: Not on file   Tobacco Use     Smoking status: Never Smoker     Smokeless tobacco: Never Used   Substance and Sexual Activity     Alcohol use: Not Currently     Alcohol/week: 0.0 standard drinks     Drug use: Never     Sexual activity: Not Currently     Partners: Male     Birth control/protection: Other   Lifestyle     Physical activity     Days per week: Not on file     Minutes per session: Not on file     Stress: Not on file   Relationships     Social connections     Talks on phone: Not on file     Gets together: Not on file     Attends Mandaeism service: Not on file     Active member of club or organization: Not on file     Attends meetings of clubs or organizations: Not on file     Relationship status: Not on file     Intimate partner violence     Fear of current or ex partner: Not on file     Emotionally abused: Not on file     Physically abused: Not on file     Forced sexual activity: Not on file   Other Topics Concern     Parent/sibling w/ CABG, MI or angioplasty before 65F 55M? Not Asked   Social History Narrative    Lives with mom and extended family but \"toxic environment\" per her report. She would like to move out but cannot financially do so. She has minimal support at home despite her significant SCD comorbidities and cognitive delay from stroke.       Medications:  Current Outpatient Medications   Medication     acetaminophen (TYLENOL) 325 MG tablet     " "albuterol (PROAIR HFA/PROVENTIL HFA/VENTOLIN HFA) 108 (90 Base) MCG/ACT inhaler     albuterol (PROVENTIL) (2.5 MG/3ML) 0.083% neb solution     aspirin (ASPIRIN) 81 MG EC tablet     budesonide-formoterol (SYMBICORT) 160-4.5 MCG/ACT Inhaler     celecoxib (CELEBREX) 100 MG capsule     diphenhydrAMINE (BENADRYL) 25 MG capsule     hydroxyurea (HYDREA) 500 MG capsule     Hydroxyurea 1000 MG TABS     ibuprofen (ADVIL/MOTRIN) 200 MG tablet     JADENU 360 MG tablet     medroxyPROGESTERone (DEPO-PROVERA) 150 MG/ML IM injection     naloxone (NARCAN) 4 MG/0.1ML nasal spray     ondansetron (ZOFRAN) 8 MG tablet     oxyCODONE IR (ROXICODONE) 10 MG tablet     sertraline (ZOLOFT) 100 MG tablet     No current facility-administered medications for this visit.          Physical Exam:   /81 (BP Location: Left arm, Patient Position: Sitting, Cuff Size: Adult Regular)   Pulse 103   Temp 98.9  F (37.2  C) (Tympanic)   Resp 16   Ht 1.626 m (5' 4\")   Wt 73 kg (161 lb)   SpO2 98%   BMI 27.64 kg/m       GEN: young  female, feeling well today  HEENT: full head of hair, no icterus, MMM  NECK: no visible asymmetry  RESP: speaking in full sentences, no increased work of breathing  MSK: brace on right wrist and right ankle (AFO). Contracture of right wrist  NEURO: alert and oriented  SKIN: port site c/d/i     Labs:   Results for MIKAELAHIMANSHU OTTO PATY (MRN [id number]) as of 11/22/2020 21:56  Results for MIKAELA, HIMANSHU PATY (MRN [id number]) as of 1/18/2021 00:07   Ref. Range 1/12/2021 18:42 1/13/2021 15:44 1/13/2021 23:21 1/14/2021 21:28   Sodium Latest Ref Range: 133 - 144 mmol/L 138 139 141 139   Potassium Latest Ref Range: 3.4 - 5.3 mmol/L 3.9 3.7 3.7 4.3   Chloride Latest Ref Range: 94 - 109 mmol/L 108 109 108 111 (H)   Carbon Dioxide Latest Ref Range: 20 - 32 mmol/L 24 24 24 22   Urea Nitrogen Latest Ref Range: 7 - 30 mg/dL 19 14 11 15   Creatinine Latest Ref Range: 0.52 - 1.04 mg/dL 0.66 0.51 (L) 0.65 0.63   GFR Estimate Latest " Ref Range: >60 mL/min/1.73_m2 >90 >90 >90 >90   GFR Estimate If Black Latest Ref Range: >60 mL/min/1.73_m2 >90 >90 >90 >90   Calcium Latest Ref Range: 8.5 - 10.1 mg/dL 8.9 8.7 9.0 8.8   Anion Gap Latest Ref Range: 3 - 14 mmol/L 6 6 9 6   Albumin Latest Ref Range: 3.4 - 5.0 g/dL 4.1 4.1 4.1    Protein Total Latest Ref Range: 6.8 - 8.8 g/dL 8.0 7.8 7.8    Bilirubin Total Latest Ref Range: 0.2 - 1.3 mg/dL 3.2 (H) 3.3 (H) 3.1 (H)    Alkaline Phosphatase Latest Ref Range: 40 - 150 U/L 70 68 71    ALT Latest Ref Range: 0 - 50 U/L 84 (H) 84 (H) 85 (H)    AST Latest Ref Range: 0 - 45 U/L 104 (H) 108 (H) 110 (H)    Ferritin Latest Ref Range: 12 - 150 ng/mL  10,926 (H)     HCG Qualitative Serum Latest Ref Range: NEG^Negative    Negative    Glucose Latest Ref Range: 70 - 99 mg/dL 93 90 99 82   WBC Latest Ref Range: 4.0 - 11.0 10e9/L 12.6 (H) 10.3 12.8 (H) 13.5 (H)   Hemoglobin Latest Ref Range: 11.7 - 15.7 g/dL 8.4 (L) 8.1 (L) 7.5 (L) 7.6 (L)   Hematocrit Latest Ref Range: 35.0 - 47.0 % 23.3 (L) 22.5 (L) 20.8 (L) 20.9 (L)   Platelet Count Latest Ref Range: 150 - 450 10e9/L 288 285 317 346   RBC Count Latest Ref Range: 3.8 - 5.2 10e12/L 2.56 (L) 2.51 (L) 2.37 (L) 2.36 (L)   MCV Latest Ref Range: 78 - 100 fl 91 90 88 89   MCH Latest Ref Range: 26.5 - 33.0 pg 32.8 32.3 31.6 32.2   MCHC Latest Ref Range: 31.5 - 36.5 g/dL 36.1 36.0 36.1 36.4   RDW Latest Ref Range: 10.0 - 15.0 % 19.3 (H) 19.6 (H) 19.7 (H) 21.2 (H)   Diff Method Unknown Automated Method Automated Method Automated Method Automated Method   % Neutrophils Latest Units: % 61.3 57.9 54.4 60.0   % Lymphocytes Latest Units: % 23.0 23.2 27.0 25.0   % Monocytes Latest Units: % 9.0 9.9 9.9 7.6   % Eosinophils Latest Units: % 4.6 6.7 6.1 4.7   % Basophils Latest Units: % 1.6 1.8 2.1 2.2   % Immature Granulocytes Latest Units: % 0.5 0.5 0.5 0.5   Nucleated RBCs Latest Ref Range: 0 /100 1 (H) 1 (H) 1 (H) 2 (H)   Absolute Neutrophil Latest Ref Range: 1.6 - 8.3 10e9/L 7.7 5.9 7.0  8.1   Absolute Lymphocytes Latest Ref Range: 0.8 - 5.3 10e9/L 2.9 2.4 3.4 3.4   Absolute Monocytes Latest Ref Range: 0.0 - 1.3 10e9/L 1.1 1.0 1.3 1.0   Absolute Eosinophils Latest Ref Range: 0.0 - 0.7 10e9/L 0.6 0.7 0.8 (H) 0.6   Absolute Basophils Latest Ref Range: 0.0 - 0.2 10e9/L 0.2 0.2 0.3 (H) 0.3 (H)   Abs Immature Granulocytes Latest Ref Range: 0 - 0.4 10e9/L 0.1 0.1 0.1 0.1   Absolute Nucleated RBC Unknown 0.1 0.1 0.2 0.2   % Retic Latest Ref Range: 0.5 - 2.0 % 11.1 (H) 14.0 (H) 12.7 (H)    Absolute Retic Latest Ref Range: 25 - 95 10e9/L 284.2 (H) 353.2 (H) 300.3 (H)    Hepatitis C Antibody Latest Ref Range: NR^Nonreactive   Nonreactive         Imaging: none today    Assessment:  Jennifer Cervantes is a 20 year old female with HbSS. Her disease has been complicated by stroke leading to significant cognitive delay and right sided hemiparesis, high anxiety leading to frequent pain crises on top of chronic pain syndrome, poor social structure at home with no real support, and iron overload secondary to repeated transfusions.     Major Topics of the Visit:  1. Pain Management: We discussed our current strategy. She has weaned off oxycontin by her own request. She has had more pain this week but denies that it is due to oxycontin and said there were more stressorts at home. Otherwise, she is back to her monthly refills for the most part.  2. Iron Overload/Pharmacy Issues: She is now on chelation  3. High RBC turnover: Jennifer really has a high degree of RBC turnover, more than most patients I have seen. Oxybryta led to more frequent pain episodes (chest pain, which was new) and did not change the RBC turnover so it was stopped in Decemberl.  4. Refills: None today but will probably need oxycodone soon.  5. Follow up: 4 weeks with ANDREAS.     I spent a total of 26 minutes face-to-face with Jennifer Cervantes during today's office visit. Over 50% of the time was spent discussing how we may work to improve the pharmacy issues and  counseling on the pros and cons to our current pain management strategies so she could understand. See note for details.    Eric Duncan MD  Hematologist  Division of Hematology, Oncology, and Transplantation  [facility]  [facility]  Pager: [phone]

## 2021-01-18 ENCOUNTER — HOSPITAL ENCOUNTER (EMERGENCY)
Facility: CLINIC | Age: 21
Discharge: HOME | End: 2021-01-18
Attending: EMERGENCY MEDICINE | Admitting: EMERGENCY MEDICINE
Payer: COMMERCIAL

## 2021-01-18 ENCOUNTER — PATIENT OUTREACH (OUTPATIENT)
Dept: ONCOLOGY | Facility: CLINIC | Age: 21
End: 2021-01-18

## 2021-01-18 VITALS
OXYGEN SATURATION: 98 % | HEIGHT: 64 IN | SYSTOLIC BLOOD PRESSURE: 140 MMHG | DIASTOLIC BLOOD PRESSURE: 80 MMHG | HEART RATE: 107 BPM | WEIGHT: 162.4 LBS | TEMPERATURE: 99 F | BODY MASS INDEX: 27.72 KG/M2 | RESPIRATION RATE: 16 BRPM

## 2021-01-18 DIAGNOSIS — D57.1 HB-SS DISEASE WITHOUT CRISIS (H): ICD-10-CM

## 2021-01-18 DIAGNOSIS — D57.00 SICKLE CELL PAIN CRISIS (H): ICD-10-CM

## 2021-01-18 DIAGNOSIS — D57.00 HB-SS DISEASE WITH CRISIS (H): ICD-10-CM

## 2021-01-18 DIAGNOSIS — D57.00 SICKLE CELL CRISIS (H): ICD-10-CM

## 2021-01-18 LAB
ALBUMIN SERPL-MCNC: 4.1 G/DL (ref 3.4–5)
ALP SERPL-CCNC: 71 U/L (ref 40–150)
ALT SERPL W P-5'-P-CCNC: 88 U/L (ref 0–50)
ANION GAP SERPL CALCULATED.3IONS-SCNC: 6 MMOL/L (ref 3–14)
ANISOCYTOSIS BLD QL SMEAR: ABNORMAL
AST SERPL W P-5'-P-CCNC: 96 U/L (ref 0–45)
BASOPHILS # BLD AUTO: 0.1 10E9/L (ref 0–0.2)
BASOPHILS NFR BLD AUTO: 0.9 %
BILIRUB SERPL-MCNC: 3.2 MG/DL (ref 0.2–1.3)
BUN SERPL-MCNC: 9 MG/DL (ref 7–30)
CALCIUM SERPL-MCNC: 8.6 MG/DL (ref 8.5–10.1)
CHLORIDE SERPL-SCNC: 111 MMOL/L (ref 94–109)
CO2 SERPL-SCNC: 23 MMOL/L (ref 20–32)
CREAT SERPL-MCNC: 0.58 MG/DL (ref 0.52–1.04)
DIFFERENTIAL METHOD BLD: ABNORMAL
EOSINOPHIL # BLD AUTO: 0.6 10E9/L (ref 0–0.7)
EOSINOPHIL NFR BLD AUTO: 4.3 %
ERYTHROCYTE [DISTWIDTH] IN BLOOD BY AUTOMATED COUNT: 21.4 % (ref 10–15)
GFR SERPL CREATININE-BSD FRML MDRD: >90 ML/MIN/{1.73_M2}
GLUCOSE SERPL-MCNC: 96 MG/DL (ref 70–99)
HCT VFR BLD AUTO: 22.8 % (ref 35–47)
HGB BLD-MCNC: 8 G/DL (ref 11.7–15.7)
LYMPHOCYTES # BLD AUTO: 2.4 10E9/L (ref 0.8–5.3)
LYMPHOCYTES NFR BLD AUTO: 18.8 %
MACROCYTES BLD QL SMEAR: PRESENT
MCH RBC QN AUTO: 32.8 PG (ref 26.5–33)
MCHC RBC AUTO-ENTMCNC: 35.1 G/DL (ref 31.5–36.5)
MCV RBC AUTO: 93 FL (ref 78–100)
MICROCYTES BLD QL SMEAR: PRESENT
MONOCYTES # BLD AUTO: 1 10E9/L (ref 0–1.3)
MONOCYTES NFR BLD AUTO: 7.7 %
NEUTROPHILS # BLD AUTO: 8.9 10E9/L (ref 1.6–8.3)
NEUTROPHILS NFR BLD AUTO: 68.3 %
NRBC # BLD AUTO: 1.6 10*3/UL
NRBC BLD AUTO-RTO: 12 /100
PLATELET # BLD AUTO: 387 10E9/L (ref 150–450)
PLATELET # BLD EST: ABNORMAL 10*3/UL
POIKILOCYTOSIS BLD QL SMEAR: ABNORMAL
POLYCHROMASIA BLD QL SMEAR: ABNORMAL
POTASSIUM SERPL-SCNC: 3.4 MMOL/L (ref 3.4–5.3)
PROT SERPL-MCNC: 7.9 G/DL (ref 6.8–8.8)
RBC # BLD AUTO: 2.44 10E12/L (ref 3.8–5.2)
RETICS # AUTO: 641 10E9/L (ref 25–95)
RETICS/RBC NFR AUTO: 26 % (ref 0.5–2)
SICKLE CELLS BLD QL SMEAR: ABNORMAL
SODIUM SERPL-SCNC: 139 MMOL/L (ref 133–144)
TARGETS BLD QL SMEAR: SLIGHT
WBC # BLD AUTO: 13 10E9/L (ref 4–11)

## 2021-01-18 PROCEDURE — 96374 THER/PROPH/DIAG INJ IV PUSH: CPT

## 2021-01-18 PROCEDURE — 96375 TX/PRO/DX INJ NEW DRUG ADDON: CPT

## 2021-01-18 PROCEDURE — 258N000003 HC RX IP 258 OP 636: Performed by: EMERGENCY MEDICINE

## 2021-01-18 PROCEDURE — 80053 COMPREHEN METABOLIC PANEL: CPT | Performed by: EMERGENCY MEDICINE

## 2021-01-18 PROCEDURE — 250N000013 HC RX MED GY IP 250 OP 250 PS 637: Performed by: EMERGENCY MEDICINE

## 2021-01-18 PROCEDURE — 85025 COMPLETE CBC W/AUTO DIFF WBC: CPT | Performed by: EMERGENCY MEDICINE

## 2021-01-18 PROCEDURE — 96376 TX/PRO/DX INJ SAME DRUG ADON: CPT

## 2021-01-18 PROCEDURE — 96361 HYDRATE IV INFUSION ADD-ON: CPT

## 2021-01-18 PROCEDURE — 99285 EMERGENCY DEPT VISIT HI MDM: CPT | Mod: 25

## 2021-01-18 PROCEDURE — 250N000011 HC RX IP 250 OP 636: Performed by: EMERGENCY MEDICINE

## 2021-01-18 PROCEDURE — 85045 AUTOMATED RETICULOCYTE COUNT: CPT | Performed by: EMERGENCY MEDICINE

## 2021-01-18 PROCEDURE — 99284 EMERGENCY DEPT VISIT MOD MDM: CPT | Performed by: EMERGENCY MEDICINE

## 2021-01-18 RX ORDER — KETOROLAC TROMETHAMINE 30 MG/ML
30 INJECTION, SOLUTION INTRAMUSCULAR; INTRAVENOUS ONCE
Status: COMPLETED | OUTPATIENT
Start: 2021-01-18 | End: 2021-01-18

## 2021-01-18 RX ORDER — OXYCODONE HYDROCHLORIDE 10 MG/1
TABLET ORAL
Qty: 60 TABLET | Refills: 0 | Status: SHIPPED | OUTPATIENT
Start: 2021-01-18 | End: 2021-02-10

## 2021-01-18 RX ORDER — HEPARIN SODIUM (PORCINE) LOCK FLUSH IV SOLN 100 UNIT/ML 100 UNIT/ML
5 SOLUTION INTRAVENOUS
Status: DISCONTINUED | OUTPATIENT
Start: 2021-01-18 | End: 2021-01-19 | Stop reason: HOSPADM

## 2021-01-18 RX ORDER — DIPHENHYDRAMINE HCL 25 MG
25 CAPSULE ORAL ONCE
Status: COMPLETED | OUTPATIENT
Start: 2021-01-18 | End: 2021-01-18

## 2021-01-18 RX ORDER — ONDANSETRON 2 MG/ML
8 INJECTION INTRAMUSCULAR; INTRAVENOUS
Status: DISCONTINUED | OUTPATIENT
Start: 2021-01-18 | End: 2021-01-19 | Stop reason: HOSPADM

## 2021-01-18 RX ORDER — MORPHINE SULFATE 2 MG/ML
2 INJECTION, SOLUTION INTRAMUSCULAR; INTRAVENOUS
Status: COMPLETED | OUTPATIENT
Start: 2021-01-18 | End: 2021-01-18

## 2021-01-18 RX ADMIN — KETOROLAC TROMETHAMINE 30 MG: 30 INJECTION, SOLUTION INTRAMUSCULAR at 20:11

## 2021-01-18 RX ADMIN — SODIUM CHLORIDE, POTASSIUM CHLORIDE, SODIUM LACTATE AND CALCIUM CHLORIDE 1000 ML: 600; 310; 30; 20 INJECTION, SOLUTION INTRAVENOUS at 21:45

## 2021-01-18 RX ADMIN — Medication 5 ML: at 22:26

## 2021-01-18 RX ADMIN — DIPHENHYDRAMINE HYDROCHLORIDE 25 MG: 25 CAPSULE ORAL at 20:10

## 2021-01-18 RX ADMIN — MORPHINE SULFATE 2 MG: 2 INJECTION, SOLUTION INTRAMUSCULAR; INTRAVENOUS at 21:15

## 2021-01-18 RX ADMIN — MORPHINE SULFATE 2 MG: 2 INJECTION, SOLUTION INTRAMUSCULAR; INTRAVENOUS at 22:13

## 2021-01-18 RX ADMIN — SODIUM CHLORIDE, POTASSIUM CHLORIDE, SODIUM LACTATE AND CALCIUM CHLORIDE 1000 ML: 600; 310; 30; 20 INJECTION, SOLUTION INTRAVENOUS at 20:10

## 2021-01-18 RX ADMIN — MORPHINE SULFATE 2 MG: 2 INJECTION, SOLUTION INTRAMUSCULAR; INTRAVENOUS at 20:16

## 2021-01-18 ASSESSMENT — ENCOUNTER SYMPTOMS
SHORTNESS OF BREATH: 0
BACK PAIN: 1
FEVER: 0
VOMITING: 0

## 2021-01-18 ASSESSMENT — MIFFLIN-ST. JEOR: SCORE: 1486.64

## 2021-01-18 NOTE — PROGRESS NOTES
Jose Ramonafshin received call from Jennifer stating that she needs to get a refill of her oxycodone 10 mg tablets, feels that taking her medication 1 tablet every 6 hours and increase to 1 tablet every 4 hours during pain crisis helpful. Writer pended refill to Dr Duncan, was seen in clinic by him on 1/15/2021.

## 2021-01-19 NOTE — ED TRIAGE NOTES
Patient is coming in ambulatory to triage with complaints of sickle cell pain in their back since 6am today.  Patient took oxycodone with no relief.

## 2021-01-19 NOTE — ED PROVIDER NOTES
"ED Provider Note  [facility]      History     Chief Complaint   Patient presents with     Sickle Cell Pain Crisis     The history is provided by the patient and medical records.     Jennifer Cervantes is a 20 year old female with a past medical history significant for sickle cell anemia, cerebral infarction, asthma, anxiety, and depression who presents here to the Emergency Department due to sickle cell pain crisis.  Patient reports that she is experiencing bilateral lower back pain.  Patient states that around 6 AM this morning, her pain began to worsen.  She states that she has been up and moving around ever since.  She reports that \"my body would not let me sit down\".  She states that if she tried to sit down, the pain would worsen. Patient denies any shortness of breath, chest pain, or fevers.  Patient also denies any vomiting.    Patient states that she had an office visit on 1/15 and stated that everything went smooth.  During this visit, her oxycodone regimen was changed in attempt to get her pain under control.  She states that she is willing to and out of the ER if she has to, in order to get her pain controlled.    Per chart review, patient has had 7 emergency department visits regarding her sickle cell pain.  The most recent of these was being on 1/14/2021.  She had been in the ED the day before for her sickle cell pain.  She stated that she thought she would be able to control the pain on her own at home, but her pain became too severe which caused her to present to the ED.  During his visit, she was experiencing back pain which is typical sickle cell pain for her.  Per her ED pain management plan, she was given lactated Ringer's, Zofran, morphine, and Toradol.  She was advised to follow-up with the hematology clinic and discharged.      Past Medical History  Past Medical History:   Diagnosis Date     Anemia      Anxiety      Bleeding disorder (H)      Blood clotting disorder (H)  "     Cerebral infarction (H) 2015     Cognitive developmental delay     low IQ. 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 (H)     right hand contractures     Iron overload due to repeated red blood cell transfusions      Migraines      Oppositional defiant behavior      Uncomplicated asthma      Past Surgical History:   Procedure Laterality Date     AS INSERT TUNNELED CV 2 CATH W/O PORT/PUMP       C BREAST REDUCTION (INCLUDES LIPO) TIER 3 Bilateral 04/23/2019     IR CVC NON TUNNEL PLACEMENT  4/7/2020     REPAIR TENDON ELBOW Right 10/2/2019    Procedure: Right Elbow Flexor Lengthening, Flexor Pronator Slide Of Wrist and Finger, Thumb Adductor Lengthening;  Surgeon: Anai Franco MD;  [facility]     TONSILLECTOMY Bilateral 10/2/2019    Procedure: Bilateral Tonsillectomy;  Surgeon: Farhana Guy MD;  [facility] OR          oxyCODONE IR (ROXICODONE) 10 MG tablet       acetaminophen (TYLENOL) 325 MG tablet       albuterol (PROAIR HFA/PROVENTIL HFA/VENTOLIN HFA) 108 (90 Base) MCG/ACT inhaler       albuterol (PROVENTIL) (2.5 MG/3ML) 0.083% neb solution       aspirin (ASPIRIN) 81 MG EC tablet       budesonide-formoterol (SYMBICORT) 160-4.5 MCG/ACT Inhaler       celecoxib (CELEBREX) 100 MG capsule       diphenhydrAMINE (BENADRYL) 25 MG capsule       hydroxyurea (HYDREA) 500 MG capsule       Hydroxyurea 1000 MG TABS       ibuprofen (ADVIL/MOTRIN) 200 MG tablet       JADENU 360 MG tablet       medroxyPROGESTERone (DEPO-PROVERA) 150 MG/ML IM injection       naloxone (NARCAN) 4 MG/0.1ML nasal spray       ondansetron (ZOFRAN) 8 MG tablet       sertraline (ZOLOFT) 100 MG tablet      Allergies   Allergen Reactions     Fish-Derived Products Hives     Seafood Hives     Contrast Dye      Diagnostic X-Ray Materials      Gadolinium      Family History  Family History   Problem  Relation Age of Onset     Sickle Cell Trait Mother      Sickle Cell Trait Father      Social History   Social History     Tobacco Use     Smoking status: Never Smoker     Smokeless tobacco: Never Used   Substance Use Topics     Alcohol use: Not Currently     Alcohol/week: 0.0 standard drinks     Drug use: Never      Past medical history, past surgical history, medications, allergies, family history, and social history were reviewed with the patient. No additional pertinent items.       Review of Systems   Constitutional: Negative for fever.   Respiratory: Negative for shortness of breath.    Cardiovascular: Negative for chest pain.   Gastrointestinal: Negative for vomiting.   Musculoskeletal: Positive for back pain (bilateral lower).         Physical Exam      Physical Exam  Vitals signs and nursing note reviewed.   Constitutional:       Appearance: She is not toxic-appearing or diaphoretic.   HENT:      Head: Atraumatic.      Right Ear: External ear normal.      Left Ear: External ear normal.      Nose: No rhinorrhea.      Mouth/Throat:      Pharynx: Oropharynx is clear.   Eyes:      General: No scleral icterus.     Conjunctiva/sclera: Conjunctivae normal.   Neck:      Musculoskeletal: No neck rigidity.   Cardiovascular:      Rate and Rhythm: Normal rate and regular rhythm.   Pulmonary:      Effort: Pulmonary effort is normal. No respiratory distress.      Breath sounds: Normal breath sounds. No stridor. No wheezing.   Abdominal:      General: Abdomen is flat. There is no distension.      Tenderness: There is no abdominal tenderness.   Musculoskeletal:      Comments: RUE atrophic sequelae s/p CVA   Skin:     General: Skin is warm and dry.      Capillary Refill: Capillary refill takes less than 2 seconds.      Coloration: Skin is not jaundiced.      Findings: No rash.   Neurological:      Mental Status: She is alert and oriented to person, place, and time. Mental status is at baseline.   Psychiatric:         Mood  and Affect: Mood normal.         Behavior: Behavior normal.       ED Course     ED Course as of Jan 18 2229   Mon Jan 18, 2021 1949 Care Plan Recommendations Reviewed:    ER/Acute Care/Infusion Clinic:       ? Morphine 2 mg IVP/SC Q1H X 3 doses  ? Toradol x1  ? Maintenance IV fluids with LR  ? Other: Benadryl PO and Zofran 8 mg IV PRN itching or nausea        2000 Orders placed from Acute Care Plan.  Nursing staff to access PICC.      2120 Increased from prior comparative studies   Reticulocyte count(!)   2120 Similar to multiple recent studies within the past week   WBC(!): 13.0   2120 Hemoglobin(!): 8.0   2120 Platelet Count: 387   2121 Reassuring  AST/ALT similar to multiple prior studies   Comprehensive metabolic panel(!)   2155 Reevaluation:  Patient relates that pain is improving after two doses of Morphine.  Anticipate discharge to home with close outpatient hematology follow up.          Procedures   7:55 PM  The patient was seen and examined by Jesus Joiner MD in Room ED17.      No results found for any visits on 01/18/21.  Medications - No data to display     Assessments & Plan (with Medical Decision Making)   This is a 20-year-old woman with sickle cell disease, well-known to the emergency department with active acute care plan and closely followed by hematology, presenting for evaluation of recurrent acute pain consistent with multiple prior sickle cell pain crises.  Differential diagnosis includes but is not limited to sickle cell pain crisis, anemia, avascular necrosis.  History and physical exam are inconsistent with acute chest syndrome or acute osteomyelitis.  Diagnostic evaluation will include screening CBC, metabolic panel, reticulocyte count.  Disposition pending results of diagnostic evaluation and response to therapeutic interventions.    I have reviewed the nursing notes. I have reviewed the findings, diagnosis, plan and need for follow up with the patient.    Final Diagnosis:  Acute  sickle cell pain crisis, improved    Disposition:  Discharged to current residence with recommended close outpatient hematology reevaluation    I, Connie Malik, am serving as a trained medical scribe to document services personally performed by Jesus Joiner MD, based on the provider's statements to me.  IJesus MD, was physically present and have reviewed and verified the accuracy of this note documented by Connie Malik.      --  Jesus Joiner MD  [facility] EMERGENCY DEPARTMENT  1/18/2021     Jesus Joiner MD  01/18/21 0686

## 2021-01-19 NOTE — DISCHARGE INSTRUCTIONS
Please make an appointment to follow up with Hematology Oncology Clinic (phone: [phone]) as soon as possible even if entirely better.

## 2021-01-20 ENCOUNTER — PATIENT OUTREACH (OUTPATIENT)
Dept: ONCOLOGY | Facility: CLINIC | Age: 21
End: 2021-01-20

## 2021-01-20 ENCOUNTER — HOSPITAL ENCOUNTER (EMERGENCY)
Facility: CLINIC | Age: 21
Discharge: HOME | End: 2021-01-21
Attending: EMERGENCY MEDICINE | Admitting: EMERGENCY MEDICINE
Payer: COMMERCIAL

## 2021-01-20 DIAGNOSIS — D57.00 SICKLE CELL PAIN CRISIS (H): ICD-10-CM

## 2021-01-20 LAB
ALBUMIN SERPL-MCNC: 4.2 G/DL (ref 3.4–5)
ALP SERPL-CCNC: 70 U/L (ref 40–150)
ALT SERPL W P-5'-P-CCNC: 78 U/L (ref 0–50)
ANION GAP SERPL CALCULATED.3IONS-SCNC: 6 MMOL/L (ref 3–14)
AST SERPL W P-5'-P-CCNC: 71 U/L (ref 0–45)
BASOPHILS # BLD AUTO: 0.2 10E9/L (ref 0–0.2)
BASOPHILS NFR BLD AUTO: 1.6 %
BILIRUB SERPL-MCNC: 2.7 MG/DL (ref 0.2–1.3)
BUN SERPL-MCNC: 12 MG/DL (ref 7–30)
CALCIUM SERPL-MCNC: 8.7 MG/DL (ref 8.5–10.1)
CHLORIDE SERPL-SCNC: 112 MMOL/L (ref 94–109)
CK SERPL-CCNC: 61 U/L (ref 30–225)
CO2 SERPL-SCNC: 21 MMOL/L (ref 20–32)
CREAT SERPL-MCNC: 0.63 MG/DL (ref 0.52–1.04)
DIFFERENTIAL METHOD BLD: ABNORMAL
EOSINOPHIL # BLD AUTO: 0.7 10E9/L (ref 0–0.7)
EOSINOPHIL NFR BLD AUTO: 5.5 %
ERYTHROCYTE [DISTWIDTH] IN BLOOD BY AUTOMATED COUNT: 19.5 % (ref 10–15)
GFR SERPL CREATININE-BSD FRML MDRD: >90 ML/MIN/{1.73_M2}
GLUCOSE SERPL-MCNC: 92 MG/DL (ref 70–99)
HCG SERPL QL: NEGATIVE
HCT VFR BLD AUTO: 24.3 % (ref 35–47)
HGB BLD-MCNC: 8.4 G/DL (ref 11.7–15.7)
IMM GRANULOCYTES # BLD: 0.1 10E9/L (ref 0–0.4)
IMM GRANULOCYTES NFR BLD: 0.5 %
LYMPHOCYTES # BLD AUTO: 3 10E9/L (ref 0.8–5.3)
LYMPHOCYTES NFR BLD AUTO: 24.8 %
MCH RBC QN AUTO: 33.1 PG (ref 26.5–33)
MCHC RBC AUTO-ENTMCNC: 34.6 G/DL (ref 31.5–36.5)
MCV RBC AUTO: 96 FL (ref 78–100)
MONOCYTES # BLD AUTO: 0.9 10E9/L (ref 0–1.3)
MONOCYTES NFR BLD AUTO: 7.6 %
NEUTROPHILS # BLD AUTO: 7.1 10E9/L (ref 1.6–8.3)
NEUTROPHILS NFR BLD AUTO: 60 %
NRBC # BLD AUTO: 0.4 10*3/UL
NRBC BLD AUTO-RTO: 4 /100
PLATELET # BLD AUTO: 366 10E9/L (ref 150–450)
POTASSIUM SERPL-SCNC: 3.8 MMOL/L (ref 3.4–5.3)
PROT SERPL-MCNC: 8.1 G/DL (ref 6.8–8.8)
RBC # BLD AUTO: 2.54 10E12/L (ref 3.8–5.2)
RETICS # AUTO: 631.4 10E9/L (ref 25–95)
RETICS/RBC NFR AUTO: 24.9 % (ref 0.5–2)
SODIUM SERPL-SCNC: 138 MMOL/L (ref 133–144)
WBC # BLD AUTO: 11.9 10E9/L (ref 4–11)

## 2021-01-20 PROCEDURE — 258N000003 HC RX IP 258 OP 636: Performed by: EMERGENCY MEDICINE

## 2021-01-20 PROCEDURE — 96361 HYDRATE IV INFUSION ADD-ON: CPT | Performed by: EMERGENCY MEDICINE

## 2021-01-20 PROCEDURE — 96374 THER/PROPH/DIAG INJ IV PUSH: CPT | Performed by: EMERGENCY MEDICINE

## 2021-01-20 PROCEDURE — 85045 AUTOMATED RETICULOCYTE COUNT: CPT | Performed by: EMERGENCY MEDICINE

## 2021-01-20 PROCEDURE — 80053 COMPREHEN METABOLIC PANEL: CPT | Performed by: EMERGENCY MEDICINE

## 2021-01-20 PROCEDURE — 99285 EMERGENCY DEPT VISIT HI MDM: CPT | Performed by: EMERGENCY MEDICINE

## 2021-01-20 PROCEDURE — 96376 TX/PRO/DX INJ SAME DRUG ADON: CPT | Performed by: EMERGENCY MEDICINE

## 2021-01-20 PROCEDURE — 99285 EMERGENCY DEPT VISIT HI MDM: CPT | Mod: 25 | Performed by: EMERGENCY MEDICINE

## 2021-01-20 PROCEDURE — 84703 CHORIONIC GONADOTROPIN ASSAY: CPT | Performed by: EMERGENCY MEDICINE

## 2021-01-20 PROCEDURE — 82550 ASSAY OF CK (CPK): CPT | Performed by: EMERGENCY MEDICINE

## 2021-01-20 PROCEDURE — 250N000011 HC RX IP 250 OP 636: Performed by: EMERGENCY MEDICINE

## 2021-01-20 PROCEDURE — 85025 COMPLETE CBC W/AUTO DIFF WBC: CPT | Performed by: EMERGENCY MEDICINE

## 2021-01-20 RX ORDER — KETOROLAC TROMETHAMINE 15 MG/ML
15 INJECTION, SOLUTION INTRAMUSCULAR; INTRAVENOUS ONCE
Status: DISCONTINUED | OUTPATIENT
Start: 2021-01-20 | End: 2021-01-20

## 2021-01-20 RX ORDER — MORPHINE SULFATE 2 MG/ML
2 INJECTION, SOLUTION INTRAMUSCULAR; INTRAVENOUS
Status: COMPLETED | OUTPATIENT
Start: 2021-01-20 | End: 2021-01-21

## 2021-01-20 RX ADMIN — MORPHINE SULFATE 2 MG: 2 INJECTION, SOLUTION INTRAMUSCULAR; INTRAVENOUS at 22:23

## 2021-01-20 RX ADMIN — SODIUM CHLORIDE, POTASSIUM CHLORIDE, SODIUM LACTATE AND CALCIUM CHLORIDE 1000 ML: 600; 310; 30; 20 INJECTION, SOLUTION INTRAVENOUS at 22:11

## 2021-01-20 RX ADMIN — MORPHINE SULFATE 2 MG: 2 INJECTION, SOLUTION INTRAMUSCULAR; INTRAVENOUS at 23:24

## 2021-01-20 NOTE — PROGRESS NOTES
Writer received call from Jennifer who stated that her back and legs are still causing pain and inquires about availability of IVF/pain meds in [facility]fusion [facility]. Infusion center had multiple call in's and writer discussed that she likely will not be able to come in. We discussed that ok to go to ER tonight if unable to manage at home but if they offer admission, we encourage her to accept as she has had multiple trips to ER and pain appears to care team to be increased over normal. Jennifer agrees that her pain has been worse lately but that she is afraid to be in the hospital with all the sick people. Writer validated concern and encouraged her to consider accepting admission if offered as the multiple ER visits indicates that she may have a higher level of pain management need than can be accomplished at home. She will consider if offered and touch base if pain continues. Added to wait list for infusion.

## 2021-01-21 ENCOUNTER — HOSPITAL ENCOUNTER (EMERGENCY)
Facility: CLINIC | Age: 21
Discharge: HOME | End: 2021-01-22
Attending: EMERGENCY MEDICINE | Admitting: EMERGENCY MEDICINE
Payer: COMMERCIAL

## 2021-01-21 VITALS
BODY MASS INDEX: 27.66 KG/M2 | RESPIRATION RATE: 18 BRPM | HEART RATE: 110 BPM | DIASTOLIC BLOOD PRESSURE: 73 MMHG | TEMPERATURE: 98.4 F | WEIGHT: 162 LBS | HEIGHT: 64 IN | SYSTOLIC BLOOD PRESSURE: 131 MMHG | OXYGEN SATURATION: 93 %

## 2021-01-21 VITALS
RESPIRATION RATE: 16 BRPM | OXYGEN SATURATION: 95 % | TEMPERATURE: 98.2 F | SYSTOLIC BLOOD PRESSURE: 117 MMHG | HEART RATE: 72 BPM | DIASTOLIC BLOOD PRESSURE: 74 MMHG

## 2021-01-21 DIAGNOSIS — D57.00 SICKLE CELL PAIN CRISIS (H): ICD-10-CM

## 2021-01-21 LAB
ABO + RH BLD: NORMAL
ABO + RH BLD: NORMAL
ANION GAP SERPL CALCULATED.3IONS-SCNC: 9 MMOL/L (ref 3–14)
BASOPHILS # BLD AUTO: 0.2 10E9/L (ref 0–0.2)
BASOPHILS NFR BLD AUTO: 1.6 %
BLD GP AB SCN SERPL QL: NORMAL
BLOOD BANK CMNT PATIENT-IMP: NORMAL
BUN SERPL-MCNC: 12 MG/DL (ref 7–30)
CALCIUM SERPL-MCNC: 8.5 MG/DL (ref 8.5–10.1)
CHLORIDE SERPL-SCNC: 112 MMOL/L (ref 94–109)
CO2 SERPL-SCNC: 21 MMOL/L (ref 20–32)
CREAT SERPL-MCNC: 0.57 MG/DL (ref 0.52–1.04)
DIFFERENTIAL METHOD BLD: ABNORMAL
EOSINOPHIL # BLD AUTO: 0.8 10E9/L (ref 0–0.7)
EOSINOPHIL NFR BLD AUTO: 7.6 %
ERYTHROCYTE [DISTWIDTH] IN BLOOD BY AUTOMATED COUNT: 19.5 % (ref 10–15)
GFR SERPL CREATININE-BSD FRML MDRD: >90 ML/MIN/{1.73_M2}
GLUCOSE SERPL-MCNC: 102 MG/DL (ref 70–99)
HCT VFR BLD AUTO: 23.2 % (ref 35–47)
HGB BLD-MCNC: 8.1 G/DL (ref 11.7–15.7)
IMM GRANULOCYTES # BLD: 0 10E9/L (ref 0–0.4)
IMM GRANULOCYTES NFR BLD: 0.4 %
LYMPHOCYTES # BLD AUTO: 2.7 10E9/L (ref 0.8–5.3)
LYMPHOCYTES NFR BLD AUTO: 25.9 %
MCH RBC QN AUTO: 33.2 PG (ref 26.5–33)
MCHC RBC AUTO-ENTMCNC: 34.9 G/DL (ref 31.5–36.5)
MCV RBC AUTO: 95 FL (ref 78–100)
MONOCYTES # BLD AUTO: 0.9 10E9/L (ref 0–1.3)
MONOCYTES NFR BLD AUTO: 8.4 %
NEUTROPHILS # BLD AUTO: 5.8 10E9/L (ref 1.6–8.3)
NEUTROPHILS NFR BLD AUTO: 56.1 %
NRBC # BLD AUTO: 0.3 10*3/UL
NRBC BLD AUTO-RTO: 3 /100
PLATELET # BLD AUTO: 369 10E9/L (ref 150–450)
POTASSIUM SERPL-SCNC: 3.4 MMOL/L (ref 3.4–5.3)
RBC # BLD AUTO: 2.44 10E12/L (ref 3.8–5.2)
RETICS # AUTO: 563.6 10E9/L (ref 25–95)
RETICS/RBC NFR AUTO: 23.1 % (ref 0.5–2)
SODIUM SERPL-SCNC: 142 MMOL/L (ref 133–144)
SPECIMEN EXP DATE BLD: NORMAL
WBC # BLD AUTO: 10.3 10E9/L (ref 4–11)

## 2021-01-21 PROCEDURE — 85045 AUTOMATED RETICULOCYTE COUNT: CPT | Performed by: EMERGENCY MEDICINE

## 2021-01-21 PROCEDURE — 99285 EMERGENCY DEPT VISIT HI MDM: CPT | Performed by: EMERGENCY MEDICINE

## 2021-01-21 PROCEDURE — 96375 TX/PRO/DX INJ NEW DRUG ADDON: CPT | Performed by: EMERGENCY MEDICINE

## 2021-01-21 PROCEDURE — 96376 TX/PRO/DX INJ SAME DRUG ADON: CPT | Performed by: EMERGENCY MEDICINE

## 2021-01-21 PROCEDURE — 250N000011 HC RX IP 250 OP 636: Performed by: EMERGENCY MEDICINE

## 2021-01-21 PROCEDURE — 86900 BLOOD TYPING SEROLOGIC ABO: CPT | Performed by: EMERGENCY MEDICINE

## 2021-01-21 PROCEDURE — 96374 THER/PROPH/DIAG INJ IV PUSH: CPT | Performed by: EMERGENCY MEDICINE

## 2021-01-21 PROCEDURE — 80048 BASIC METABOLIC PNL TOTAL CA: CPT | Performed by: EMERGENCY MEDICINE

## 2021-01-21 PROCEDURE — 99285 EMERGENCY DEPT VISIT HI MDM: CPT | Mod: 25 | Performed by: EMERGENCY MEDICINE

## 2021-01-21 PROCEDURE — 86901 BLOOD TYPING SEROLOGIC RH(D): CPT | Performed by: EMERGENCY MEDICINE

## 2021-01-21 PROCEDURE — 258N000003 HC RX IP 258 OP 636: Performed by: EMERGENCY MEDICINE

## 2021-01-21 PROCEDURE — 85025 COMPLETE CBC W/AUTO DIFF WBC: CPT | Performed by: EMERGENCY MEDICINE

## 2021-01-21 PROCEDURE — 86850 RBC ANTIBODY SCREEN: CPT | Performed by: EMERGENCY MEDICINE

## 2021-01-21 PROCEDURE — 250N000013 HC RX MED GY IP 250 OP 250 PS 637: Performed by: EMERGENCY MEDICINE

## 2021-01-21 PROCEDURE — 96361 HYDRATE IV INFUSION ADD-ON: CPT | Performed by: EMERGENCY MEDICINE

## 2021-01-21 RX ORDER — KETOROLAC TROMETHAMINE 15 MG/ML
15 INJECTION, SOLUTION INTRAMUSCULAR; INTRAVENOUS ONCE
Status: COMPLETED | OUTPATIENT
Start: 2021-01-21 | End: 2021-01-21

## 2021-01-21 RX ORDER — DIPHENHYDRAMINE HCL 25 MG
25 CAPSULE ORAL ONCE
Status: COMPLETED | OUTPATIENT
Start: 2021-01-21 | End: 2021-01-21

## 2021-01-21 RX ORDER — MORPHINE SULFATE 2 MG/ML
2 INJECTION, SOLUTION INTRAMUSCULAR; INTRAVENOUS
Status: DISCONTINUED | OUTPATIENT
Start: 2021-01-21 | End: 2021-01-22 | Stop reason: HOSPADM

## 2021-01-21 RX ORDER — SODIUM CHLORIDE 9 MG/ML
INJECTION, SOLUTION INTRAVENOUS CONTINUOUS
Status: DISCONTINUED | OUTPATIENT
Start: 2021-01-21 | End: 2021-01-22 | Stop reason: HOSPADM

## 2021-01-21 RX ORDER — HEPARIN SODIUM (PORCINE) LOCK FLUSH IV SOLN 100 UNIT/ML 100 UNIT/ML
100 SOLUTION INTRAVENOUS ONCE
Status: COMPLETED | OUTPATIENT
Start: 2021-01-21 | End: 2021-01-21

## 2021-01-21 RX ADMIN — MORPHINE SULFATE 2 MG: 2 INJECTION, SOLUTION INTRAMUSCULAR; INTRAVENOUS at 21:30

## 2021-01-21 RX ADMIN — SODIUM CHLORIDE 1000 ML: 9 INJECTION, SOLUTION INTRAVENOUS at 21:29

## 2021-01-21 RX ADMIN — SODIUM CHLORIDE: 9 INJECTION, SOLUTION INTRAVENOUS at 22:51

## 2021-01-21 RX ADMIN — MORPHINE SULFATE 2 MG: 2 INJECTION, SOLUTION INTRAMUSCULAR; INTRAVENOUS at 00:33

## 2021-01-21 RX ADMIN — MORPHINE SULFATE 2 MG: 2 INJECTION, SOLUTION INTRAMUSCULAR; INTRAVENOUS at 22:48

## 2021-01-21 RX ADMIN — KETOROLAC TROMETHAMINE 15 MG: 15 INJECTION, SOLUTION INTRAMUSCULAR; INTRAVENOUS at 21:30

## 2021-01-21 RX ADMIN — DIPHENHYDRAMINE HYDROCHLORIDE 25 MG: 25 CAPSULE ORAL at 21:30

## 2021-01-21 RX ADMIN — Medication 100 UNITS: at 01:24

## 2021-01-21 ASSESSMENT — ENCOUNTER SYMPTOMS
SHORTNESS OF BREATH: 0
FEVER: 0
FREQUENCY: 0
DYSURIA: 0
BACK PAIN: 1

## 2021-01-21 ASSESSMENT — MIFFLIN-ST. JEOR: SCORE: 1484.83

## 2021-01-21 NOTE — ED PROVIDER NOTES
History     Chief Complaint   Patient presents with     Sickle Cell Pain Crisis     HPI  Jennifer Cervantes is a 20 year old female with a medical history significant for sickle cell anemia, right-sided cerebral infarction, asthma, anxiety, and depression who presents to the Emergency Department for evaluation of diffuse body pain similar to previous sickle cell pain crises.     Patient reported pain started last day or two. No particular trigger outside of the cold temperatures, which she says consistently exacerbates her sickle cell pain. She reports current pain is consistent w/ her usual chronic pain in setting of her sickle cell disease/pain crises. As per usual, worse somewhat in the shoulders/back, but no particular focality. Same type of pain, location, quality/features. No new features, locations or components to pain symptoms. No joint swelling, warmth or skin changes. She tried her home pain medicatin regiment and OTC meds, has been well hydrated, but wasn't able to control pain.     No traumas or falls. No fevers or chills. Denies any HEENT sx's. No sore throats/trouble swallowing. No chest pain, cough or breathing symptoms. No abdominal symptoms. No nausea, vomiting, or any GI/ symptoms. No joint symptoms. No numbness, tingling/weakness. No incontinence. No other symptoms or complaints at this time. Please see ROS for further details.        I have reviewed the Medications, Allergies, Past Medical and Surgical History, and Social History in the [employer] system.  Past Medical History:   Diagnosis Date     Anemia      Anxiety      Bleeding disorder (H)      Blood clotting disorder (H)      Cerebral infarction (H) 2015     Cognitive developmental delay     low IQ. 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 (H)     right hand contractures     Iron overload due to repeated red blood  cell transfusions      Migraines      Oppositional defiant behavior      Uncomplicated asthma        Past Surgical History:   Procedure Laterality Date     AS INSERT TUNNELED CV 2 CATH W/O PORT/PUMP       C BREAST REDUCTION (INCLUDES LIPO) TIER 3 Bilateral 04/23/2019     IR CVC NON TUNNEL PLACEMENT  4/7/2020     REPAIR TENDON ELBOW Right 10/2/2019    Procedure: Right Elbow Flexor Lengthening, Flexor Pronator Slide Of Wrist and Finger, Thumb Adductor Lengthening;  Surgeon: Anai Franco MD;  Location: [facility] OR     TONSILLECTOMY Bilateral 10/2/2019    Procedure: Bilateral Tonsillectomy;  Surgeon: Farhana Guy MD;  Location: [facility] OR     Past medical history, past surgical history, medications, and allergies were reviewed with the patient.    Family History   Problem Relation Age of Onset     Sickle Cell Trait Mother      Sickle Cell Trait Father        Social History     Tobacco Use     Smoking status: Never Smoker     Smokeless tobacco: Never Used   Substance Use Topics     Alcohol use: Not Currently     Alcohol/week: 0.0 standard drinks      Social history was reviewed with the patient.     Review of Systems   Constitutional: Negative for chills, diaphoresis, fatigue and fever.   HENT: Negative.    Respiratory: Negative for cough and shortness of breath.    Cardiovascular: Negative for chest pain and palpitations.   Gastrointestinal: Negative for abdominal pain, blood in stool, constipation, diarrhea, nausea and vomiting.   Genitourinary: Negative.    Musculoskeletal: Negative for neck pain and neck stiffness.        Diffuse body pain consistent w/ prior pain crises (worse in both shoulders and back, consistent w/ previous)   Skin: Negative for color change and rash.   Allergic/Immunologic: Negative for immunocompromised state.   Neurological: Negative for syncope, weakness, light-headedness and headaches.   All other systems reviewed and are negative.        Physical Exam   BP: 133/80  Pulse:  107  Temp: 98.2  F (36.8  C)  Resp: 16  SpO2: 96 %      CONSTITUTIONAL: Well-developed and well-nourished. Awake and alert. Non-toxic appearance. No acute distress.   HENT:   - Head: Normocephalic and atraumatic.   - Ears: Hearing and external ear grossly normal.   - Nose: Nose normal. No rhinorrhea. No epistaxis.   - Mouth/Throat: MMM  EYES: Conjunctivae and lids are normal. No scleral icterus.   NECK: Normal range of motion and phonation normal. Neck supple.  No tracheal deviation, no stridor. No edema or erythema noted. No meningeal findings.   CARDIOVASCULAR: Normal rate, regular rhythm and no appreciable abnormal heart sounds.  PULMONARY/CHEST: Normal work of breathing. No accessory muscle usage or stridor. No respiratory distress.  No appreciable abnormal breath sounds.  ABDOMEN: Soft, non-distended. No tenderness. No rigidity, rebound or guarding.   MUSCULOSKELETAL: Extremities warm and seemingly well perfused. No edema or calf tenderness. No joint findings. No swelling. Normal skin. Normal compartments.  NEUROLOGIC: Awake, alert. Not disoriented. She displays no atrophy and no tremor. Normal tone. No seizure activity. GCS 15. Strength/sensation intact.   SKIN: Skin is warm and dry. No rash noted. No diaphoresis. No pallor.   PSYCHIATRIC: Normal mood and affect. Speech and behavior normal. Thought processes linear. Cognition and memory are normal.          Assessments & Plan (with Medical Decision Making)   IMPRESSION: 20 year old female w/ PMH notable for sickle cell anemia, right-sided cerebral infarction, asthma, anxiety, and depression who presents to the Emergency Department for evaluation of diffuse body pain similar to previous sickle cell pain crises. Clinically, patient appears nontoxic, NAD. Vitals WNL Otherwise on examination, no acute findings. No findings for joint swelling or extremity edema, normal strength/sensation, no meningeal findings, no other acute findings. Ddx includes, but not limited  to, flare of prior/chronic pain, sickle cell pain crises, etc. Think unlikely infection, rhabdo, etc., as so consistent w/ prior symptoms and no other new features on H&P.     PLAN: Labs, IVF, symptom management  - No acute findings on exam or features on history to warrant emergent imaging at this time. Will continue to monitor.     RESULTS:  - Labs: No acute findings or significant changes from previous    INTERVENTIONS:   - IVF  - IV morphine (2mg IV q1h, x3, as per ED Care plan)    RE-EVALUATION:  - The patient's symptoms were improved here in the ED. Patient reports feeling much better and would like to be discharged.  - Pt otherwise continues to do well here in the ED, no acute issues or apparent concerning changes in vitals or clinical appearance.    DISCUSSIONS:  - w/ Patient: I have reviewed the available findings, plan, need for close follow up, strict return/safety instructions with the patient. She expressed understanding and agreement with this plan. All questions answered to the best of our ability at this time.     DISPOSITION/PLANNING:  - IMPRESSION: Diffuse pain consistent w/ previous flares, sickle cell pain crisis  - DISPOSITION: Discharge to home  - FOLLOW-UP: PCP/Heme teams  - RECOMMENDATIONS: Conservative symptom management, strict return instructions      ______________________________________________________________________     1/20/2021   [facility] EMERGENCY DEPARTMENT     Reta Miramontes MD  01/22/21 0569

## 2021-01-21 NOTE — DISCHARGE INSTRUCTIONS
TODAY'S VISIT:  - You should discuss all imaging/radiology tests and laboratory tests that were performed during this visit with your usual providers to ensure you continue to improve and do not need any further evaluation, testing or management.   - Please call your Primary Care team to discuss and arrange a follow-up appointment.     FOLLOW-UP:  Please make an appointment to follow up with:  - Your Primary Care Provider and Hematology teams. (Call in the morning to discuss and arrange follow-up).   - If you do not have a primary care provider, you can be seen in follow-up and establish care with one of our providers by calling of the the clinics below:  --- [facility] (phone: [phone])  --- Primary Care / [facility] (phone: [phone])   - Have your provider review the results from today's visit with you again to make sure no further follow-up or additional testing is needed based on those results.     PRESCRIPTIONS / MEDICATIONS:  - Continue previous medication plan.   - You have previously been prescribed narcotic pain medication. You should not take this medication and use alcohol or other sedating substances or medications. Do not drive, operate heavy machinery, or engage in potentially dangerous activities while on this medication. This medication can also cause constipation and other adverse reactions. If you develop abnormal symptoms stop taking this medication and contact your medical provider.       OTHER INSTRUCTIONS:  - Do your best to stay hydrated.    RETURN TO THE EMERGENCY DEPARTMENT  Return to the Emergency Department immediately for any new or worsening symptoms or any concerns.     Remember that you can always come back to the Emergency Department if you are not able to see your regular doctor in the amount of time listed above, if you get any new symptoms, or if there is anything that worries you.

## 2021-01-21 NOTE — ED TRIAGE NOTES
"Pt experiencing 9/10 generalized pain \"all over body.\" Pt took ibuprofen and tylenol and home with no relief.   "

## 2021-01-22 PROCEDURE — 96376 TX/PRO/DX INJ SAME DRUG ADON: CPT | Performed by: EMERGENCY MEDICINE

## 2021-01-22 PROCEDURE — 99285 EMERGENCY DEPT VISIT HI MDM: CPT | Performed by: EMERGENCY MEDICINE

## 2021-01-22 PROCEDURE — 250N000011 HC RX IP 250 OP 636: Performed by: EMERGENCY MEDICINE

## 2021-01-22 RX ORDER — HEPARIN SODIUM (PORCINE) LOCK FLUSH IV SOLN 100 UNIT/ML 100 UNIT/ML
5 SOLUTION INTRAVENOUS
Status: DISCONTINUED | OUTPATIENT
Start: 2021-01-22 | End: 2021-01-22 | Stop reason: HOSPADM

## 2021-01-22 RX ADMIN — MORPHINE SULFATE 2 MG: 2 INJECTION, SOLUTION INTRAMUSCULAR; INTRAVENOUS at 00:08

## 2021-01-22 ASSESSMENT — ENCOUNTER SYMPTOMS
NECK STIFFNESS: 0
NECK PAIN: 0
CHILLS: 0
DIARRHEA: 0
WEAKNESS: 0
NAUSEA: 0
CONSTIPATION: 0
COLOR CHANGE: 0
DIAPHORESIS: 0
SHORTNESS OF BREATH: 0
VOMITING: 0
BLOOD IN STOOL: 0
LIGHT-HEADEDNESS: 0
FATIGUE: 0
COUGH: 0
FEVER: 0
ABDOMINAL PAIN: 0
HEADACHES: 0
PALPITATIONS: 0

## 2021-01-22 ASSESSMENT — MIFFLIN-ST. JEOR: SCORE: 1484.83

## 2021-01-22 NOTE — ED PROVIDER NOTES
[place] EMERGENCY DEPARTMENT ([facility])  January 21, 2021  History   No chief complaint on file.    SHEILA Cervantes is a 20 year old female with a PMH of hemiplegia and hemiparesis following stroke affecting right side, sickle cell anemia, sickle cell pain crisis, moderate persistent asthma, obesity, iron overload due to repeated transfusions, blood clotting disorder, oppositional defiant disorder, panic disorder, depression and emergency care plan in place who presents to the ED today complaining of back pain.  Patient states this is the same pain that she had last night.  She states that the pain started yesterday in her back.  She states that this is typical of her sickle cell pain flares.  She states she had 3 rounds of pain medication in the ED last night.  She states she woke up this morning with more of the same pain.  She states she uses ibuprofen, Tylenol, and oxycodone at home, with her last use being earlier today.  Patient denies dysuria, frequency, fever, chest pain, or shortness of breath.    Patient was recently seen here in the ED yesterday complaining of sickle cell pain.  Patient was treated with IV morphine (2mg IV q1h, x3, as per ED Care plan).  Patient symptoms were improved and she requested discharge.    I have reviewed the Medications, Allergies, Past Medical and Surgical History, and Social History in the [employer] system.  PAST MEDICAL HISTORY:   Past Medical History:   Diagnosis Date     Anemia      Anxiety      Bleeding disorder (H)      Blood clotting disorder (H)      Cerebral infarction (H) 2015     Cognitive developmental delay     low IQ. 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 (H)     right hand contractures     Iron overload due to repeated red blood cell transfusions      Migraines      Oppositional defiant behavior      Uncomplicated  asthma        PAST SURGICAL HISTORY:   Past Surgical History:   Procedure Laterality Date     AS INSERT TUNNELED CV 2 CATH W/O PORT/PUMP       C BREAST REDUCTION (INCLUDES LIPO) TIER 3 Bilateral 04/23/2019     IR CVC NON TUNNEL PLACEMENT  4/7/2020     REPAIR TENDON ELBOW Right 10/2/2019    Procedure: Right Elbow Flexor Lengthening, Flexor Pronator Slide Of Wrist and Finger, Thumb Adductor Lengthening;  Surgeon: Anai Franco MD;  [facility] OR     TONSILLECTOMY Bilateral 10/2/2019    Procedure: Bilateral Tonsillectomy;  Surgeon: Farhana Guy MD;  Location: [facility] OR       Past medical history, past surgical history, medications, and allergies were reviewed with the patient. Additional pertinent items: None    FAMILY HISTORY:   Family History   Problem Relation Age of Onset     Sickle Cell Trait Mother      Sickle Cell Trait Father        SOCIAL HISTORY:   Social History     Tobacco Use     Smoking status: Never Smoker     Smokeless tobacco: Never Used   Substance Use Topics     Alcohol use: Not Currently     Alcohol/week: 0.0 standard drinks     Social history was reviewed with the patient. Additional pertinent items: None        Patient's Medications   New Prescriptions    No medications on file   Previous Medications    ACETAMINOPHEN (TYLENOL) 325 MG TABLET    Take 2 tablets (650 mg) by mouth every 6 hours as needed for mild pain    ALBUTEROL (PROAIR HFA/PROVENTIL HFA/VENTOLIN HFA) 108 (90 BASE) MCG/ACT INHALER    Inhale 2 puffs into the lungs every 6 hours as needed for shortness of breath / dyspnea or wheezing    ALBUTEROL (PROVENTIL) (2.5 MG/3ML) 0.083% NEB SOLUTION    Take 1 vial (2.5 mg) by nebulization every 6 hours as needed for shortness of breath / dyspnea or wheezing    ASPIRIN (ASPIRIN) 81 MG EC TABLET    Take 1 tablet (81 mg) by mouth daily    BUDESONIDE-FORMOTEROL (SYMBICORT) 160-4.5 MCG/ACT INHALER    Inhale 2 puffs into the lungs 2 times daily    CELECOXIB (CELEBREX) 100 MG  CAPSULE    Take 1 capsule (100 mg) by mouth 2 times daily    DIPHENHYDRAMINE (BENADRYL) 25 MG CAPSULE    Take 1-2 capsules (25-50 mg) by mouth nightly as needed for sleep    HYDROXYUREA (HYDREA) 500 MG CAPSULE    Take 4 capsules (2,000 mg) by mouth daily    HYDROXYUREA 1000 MG TABS    Take 2,000 mg by mouth daily    IBUPROFEN (ADVIL/MOTRIN) 200 MG TABLET    Take 3 tablets (600 mg) by mouth every 6 hours as needed for moderate pain    JADENU 360 MG TABLET    Take 4 tablets (1,440 mg) by mouth daily    MEDROXYPROGESTERONE (DEPO-PROVERA) 150 MG/ML IM INJECTION    Inject 150 mg into the muscle    NALOXONE (NARCAN) 4 MG/0.1ML NASAL SPRAY    Spray 1 spray (4 mg) into one nostril alternating nostrils once as needed for opioid reversal every 2-3 minutes until assistance arrives    ONDANSETRON (ZOFRAN) 8 MG TABLET        OXYCODONE IR (ROXICODONE) 10 MG TABLET    Take 1 tablet (10mg) by mouth every 6 hours for pain. If, after 2 doses it is not working, try a dose at 4 hours and call clinic.    SERTRALINE (ZOLOFT) 100 MG TABLET    Take 2 tablets (200 mg) by mouth daily   Modified Medications    No medications on file   Discontinued Medications    No medications on file          Allergies   Allergen Reactions     Fish-Derived Products Hives     Seafood Hives     Contrast Dye      Diagnostic X-Ray Materials      Gadolinium         Review of Systems   Constitutional: Negative for fever.   Respiratory: Negative for shortness of breath.    Cardiovascular: Negative for chest pain.   Genitourinary: Negative for dysuria and frequency.   Musculoskeletal: Positive for back pain (sickle cell pain crisis).   All other systems reviewed and are negative.    A complete review of systems was performed with pertinent positives and negatives noted in the HPI, and all other systems negative.    Physical Exam          Physical Exam  Vitals signs and nursing note reviewed.   Constitutional:       General: She is not in acute distress.      Appearance: Normal appearance. She is not diaphoretic.   HENT:      Head: Atraumatic.      Mouth/Throat:      Pharynx: No oropharyngeal exudate.   Eyes:      General: No scleral icterus.     Pupils: Pupils are equal, round, and reactive to light.   Cardiovascular:      Rate and Rhythm: Normal rate and regular rhythm.      Heart sounds: Normal heart sounds.   Pulmonary:      Effort: No respiratory distress.      Breath sounds: Normal breath sounds.   Abdominal:      General: Bowel sounds are normal.      Palpations: Abdomen is soft.      Tenderness: There is no abdominal tenderness.   Musculoskeletal:         General: No tenderness.   Skin:     General: Skin is warm.      Findings: No rash.   Neurological:      General: No focal deficit present.      Mental Status: She is alert and oriented to person, place, and time.         ED Course   9:26 PM  The patient was seen and examined by Dmitri Thomas MD in Room ED15.        Procedures                           No results found for this or any previous visit (from the past 24 hour(s)).  Medications - No data to display          Assessments & Plan (with Medical Decision Making)     20 year old female with a PMH of hemiplegia and hemiparesis following stroke affecting right side, sickle cell anemia, sickle cell pain crisis, moderate persistent asthma, obesity, iron overload due to repeated transfusions, blood clotting disorder, oppositional defiant disorder, panic disorder, depression and emergency care plan in place who presents to the ED today complaining of back pain.  IV established, labs drawn sent reviewed document epic remarkable for hemoglobin 8.1, reticulocyte count is 23%, otherwise normal CBC and electrolytes.  Patient was medicated per her pain management protocol with morphine 2 mg IV x3 along with Benadryl 25 mg p.o. x1 and Toradol 15 mg IV x1.  Upon repeat evaluation patient symptoms have improved to the point where she is agreeable to go home.  Plan to  follow-up with her hematologist for further evaluation care.    I have reviewed the nursing notes.    I have reviewed the findings, diagnosis, plan and need for follow up with the patient.    New Prescriptions    No medications on file       Final diagnoses:   Sickle cell pain crisis (H)   IGutierrez, am serving as a trained medical scribe to document services personally performed by Dmitri Thomas MD, based on the provider's statements to me.     Dmitri NEWMAN MD, was physically present and have reviewed and verified the accuracy of this note documented by Gutierrez Gibson.      1/21/2021   [facility] EMERGENCY DEPARTMENT     Dmitri Thomas MD  01/25/21 4212

## 2021-01-22 NOTE — ED TRIAGE NOTES
Pt presents to ED c/o back / arm pain due to sickle cell. PT states her pain started this morning and has increased throughout the day.

## 2021-01-23 ENCOUNTER — HOSPITAL ENCOUNTER (EMERGENCY)
Facility: CLINIC | Age: 21
Discharge: HOME | End: 2021-01-23
Attending: EMERGENCY MEDICINE | Admitting: EMERGENCY MEDICINE
Payer: COMMERCIAL

## 2021-01-23 ENCOUNTER — APPOINTMENT (OUTPATIENT)
Dept: GENERAL RADIOLOGY | Facility: CLINIC | Age: 21
End: 2021-01-23
Attending: EMERGENCY MEDICINE
Payer: COMMERCIAL

## 2021-01-23 VITALS
WEIGHT: 162 LBS | RESPIRATION RATE: 18 BRPM | OXYGEN SATURATION: 98 % | BODY MASS INDEX: 27.66 KG/M2 | HEIGHT: 64 IN | HEART RATE: 97 BPM | SYSTOLIC BLOOD PRESSURE: 142 MMHG | TEMPERATURE: 98 F | DIASTOLIC BLOOD PRESSURE: 105 MMHG

## 2021-01-23 DIAGNOSIS — D57.00 SICKLE CELL PAIN CRISIS (H): ICD-10-CM

## 2021-01-23 LAB
ALBUMIN SERPL-MCNC: 4.2 G/DL (ref 3.4–5)
ALP SERPL-CCNC: 72 U/L (ref 40–150)
ALT SERPL W P-5'-P-CCNC: 76 U/L (ref 0–50)
ANION GAP SERPL CALCULATED.3IONS-SCNC: 6 MMOL/L (ref 3–14)
AST SERPL W P-5'-P-CCNC: 86 U/L (ref 0–45)
BASOPHILS # BLD AUTO: 0.3 10E9/L (ref 0–0.2)
BASOPHILS NFR BLD AUTO: 1.9 %
BILIRUB SERPL-MCNC: 3.2 MG/DL (ref 0.2–1.3)
BUN SERPL-MCNC: 13 MG/DL (ref 7–30)
CALCIUM SERPL-MCNC: 8.8 MG/DL (ref 8.5–10.1)
CHLORIDE SERPL-SCNC: 110 MMOL/L (ref 94–109)
CO2 SERPL-SCNC: 22 MMOL/L (ref 20–32)
CREAT SERPL-MCNC: 0.71 MG/DL (ref 0.52–1.04)
DIFFERENTIAL METHOD BLD: ABNORMAL
EOSINOPHIL # BLD AUTO: 0.9 10E9/L (ref 0–0.7)
EOSINOPHIL NFR BLD AUTO: 6.3 %
ERYTHROCYTE [DISTWIDTH] IN BLOOD BY AUTOMATED COUNT: 19.4 % (ref 10–15)
GFR SERPL CREATININE-BSD FRML MDRD: >90 ML/MIN/{1.73_M2}
GLUCOSE SERPL-MCNC: 86 MG/DL (ref 70–99)
HCG SERPL QL: NEGATIVE
HCT VFR BLD AUTO: 22.4 % (ref 35–47)
HGB BLD-MCNC: 7.8 G/DL (ref 11.7–15.7)
IMM GRANULOCYTES # BLD: 0.1 10E9/L (ref 0–0.4)
IMM GRANULOCYTES NFR BLD: 0.4 %
INTERPRETATION ECG - MUSE: NORMAL
LYMPHOCYTES # BLD AUTO: 4.3 10E9/L (ref 0.8–5.3)
LYMPHOCYTES NFR BLD AUTO: 30.1 %
MCH RBC QN AUTO: 32 PG (ref 26.5–33)
MCHC RBC AUTO-ENTMCNC: 34.8 G/DL (ref 31.5–36.5)
MCV RBC AUTO: 92 FL (ref 78–100)
MONOCYTES # BLD AUTO: 1 10E9/L (ref 0–1.3)
MONOCYTES NFR BLD AUTO: 6.9 %
NEUTROPHILS # BLD AUTO: 7.7 10E9/L (ref 1.6–8.3)
NEUTROPHILS NFR BLD AUTO: 54.4 %
NRBC # BLD AUTO: 0.3 10*3/UL
NRBC BLD AUTO-RTO: 2 /100
NT-PROBNP SERPL-MCNC: 92 PG/ML (ref 0–450)
PLATELET # BLD AUTO: 361 10E9/L (ref 150–450)
POTASSIUM SERPL-SCNC: 3.8 MMOL/L (ref 3.4–5.3)
PROT SERPL-MCNC: 8.1 G/DL (ref 6.8–8.8)
RBC # BLD AUTO: 2.44 10E12/L (ref 3.8–5.2)
RETICS # AUTO: 568 10E9/L (ref 25–95)
RETICS/RBC NFR AUTO: 23.3 % (ref 0.5–2)
SODIUM SERPL-SCNC: 138 MMOL/L (ref 133–144)
TROPONIN I SERPL-MCNC: <0.015 UG/L (ref 0–0.04)
WBC # BLD AUTO: 14.2 10E9/L (ref 4–11)

## 2021-01-23 PROCEDURE — 84703 CHORIONIC GONADOTROPIN ASSAY: CPT | Performed by: EMERGENCY MEDICINE

## 2021-01-23 PROCEDURE — 96361 HYDRATE IV INFUSION ADD-ON: CPT | Performed by: EMERGENCY MEDICINE

## 2021-01-23 PROCEDURE — 96374 THER/PROPH/DIAG INJ IV PUSH: CPT | Performed by: EMERGENCY MEDICINE

## 2021-01-23 PROCEDURE — 85045 AUTOMATED RETICULOCYTE COUNT: CPT | Performed by: EMERGENCY MEDICINE

## 2021-01-23 PROCEDURE — 71045 X-RAY EXAM CHEST 1 VIEW: CPT | Mod: 26 | Performed by: RADIOLOGY

## 2021-01-23 PROCEDURE — 96375 TX/PRO/DX INJ NEW DRUG ADDON: CPT | Performed by: EMERGENCY MEDICINE

## 2021-01-23 PROCEDURE — 80053 COMPREHEN METABOLIC PANEL: CPT | Performed by: EMERGENCY MEDICINE

## 2021-01-23 PROCEDURE — 258N000003 HC RX IP 258 OP 636: Performed by: EMERGENCY MEDICINE

## 2021-01-23 PROCEDURE — 83880 ASSAY OF NATRIURETIC PEPTIDE: CPT | Performed by: EMERGENCY MEDICINE

## 2021-01-23 PROCEDURE — 96376 TX/PRO/DX INJ SAME DRUG ADON: CPT | Performed by: EMERGENCY MEDICINE

## 2021-01-23 PROCEDURE — 71045 X-RAY EXAM CHEST 1 VIEW: CPT

## 2021-01-23 PROCEDURE — 250N000013 HC RX MED GY IP 250 OP 250 PS 637: Performed by: EMERGENCY MEDICINE

## 2021-01-23 PROCEDURE — 250N000011 HC RX IP 250 OP 636: Performed by: EMERGENCY MEDICINE

## 2021-01-23 PROCEDURE — 85025 COMPLETE CBC W/AUTO DIFF WBC: CPT | Performed by: EMERGENCY MEDICINE

## 2021-01-23 PROCEDURE — 84484 ASSAY OF TROPONIN QUANT: CPT | Performed by: EMERGENCY MEDICINE

## 2021-01-23 RX ORDER — HEPARIN SODIUM (PORCINE) LOCK FLUSH IV SOLN 100 UNIT/ML 100 UNIT/ML
5 SOLUTION INTRAVENOUS
Status: DISCONTINUED | OUTPATIENT
Start: 2021-01-23 | End: 2021-01-23 | Stop reason: HOSPADM

## 2021-01-23 RX ORDER — DIPHENHYDRAMINE HCL 50 MG
50 CAPSULE ORAL ONCE
Status: COMPLETED | OUTPATIENT
Start: 2021-01-23 | End: 2021-01-23

## 2021-01-23 RX ORDER — KETOROLAC TROMETHAMINE 30 MG/ML
30 INJECTION, SOLUTION INTRAMUSCULAR; INTRAVENOUS ONCE
Status: COMPLETED | OUTPATIENT
Start: 2021-01-23 | End: 2021-01-23

## 2021-01-23 RX ORDER — MORPHINE SULFATE 4 MG/ML
2 INJECTION, SOLUTION INTRAMUSCULAR; INTRAVENOUS
Status: COMPLETED | OUTPATIENT
Start: 2021-01-23 | End: 2021-01-23

## 2021-01-23 RX ADMIN — DIPHENHYDRAMINE HYDROCHLORIDE 50 MG: 50 CAPSULE ORAL at 02:53

## 2021-01-23 RX ADMIN — MORPHINE SULFATE 2 MG: 4 INJECTION INTRAVENOUS at 04:52

## 2021-01-23 RX ADMIN — MORPHINE SULFATE 2 MG: 4 INJECTION INTRAVENOUS at 03:29

## 2021-01-23 RX ADMIN — HEPARIN 5 ML: 100 SYRINGE at 06:32

## 2021-01-23 RX ADMIN — MORPHINE SULFATE 2 MG: 4 INJECTION INTRAVENOUS at 05:55

## 2021-01-23 RX ADMIN — SODIUM CHLORIDE 1000 ML: 9 INJECTION, SOLUTION INTRAVENOUS at 02:53

## 2021-01-23 RX ADMIN — KETOROLAC TROMETHAMINE 30 MG: 30 INJECTION, SOLUTION INTRAMUSCULAR at 02:53

## 2021-01-23 ASSESSMENT — ENCOUNTER SYMPTOMS
NECK STIFFNESS: 0
DIFFICULTY URINATING: 0
EYE REDNESS: 0
BACK PAIN: 1
SHORTNESS OF BREATH: 0
ABDOMINAL PAIN: 0
FEVER: 0
COLOR CHANGE: 0
ARTHRALGIAS: 0
CONFUSION: 0
HEADACHES: 0

## 2021-01-23 NOTE — DISCHARGE INSTRUCTIONS
Continue current medications.  Follow-up with your hematologist next week.    Return if fever, trouble breathing, vomiting, or other concerns.

## 2021-01-23 NOTE — ED TRIAGE NOTES
Patient reports sickle cell pain and chest pain. She states that the chest pain is different than her normal pain. Pain started today around noon. Patient took 10 mg of Oxycodone about 2 hours prior to coming to the ER without improvement.

## 2021-01-23 NOTE — ED PROVIDER NOTES
[place] EMERGENCY DEPARTMENT ([facility])  1/22/21  History     Chief Complaint   Patient presents with     Sickle Cell Pain Crisis     Chest Pain     The history is provided by the patient and medical records.     Jennifer Cervantes is a 20 year old female  with a medical history significant for sickle cell anemia, right-sided cerebral infarction, asthma, anxiety, and depression who presents to the Emergency Department for evaluation of a suspected sickle cell pain crisis.  The patient is quite upset in the emergency department today as she had to wait in the lobby for period of time due to high emergency department volumes.  She complains of pain in the shoulders and back.  She also complains of some central chest pain that she is unable to describe.  She denies pleuritic component.  She denies any shortness of breath.  No cough.  No hemoptysis.  No leg pain or swelling.  Patient denies any abdominal pain.  No nausea or vomiting.  No dysuria, urgency, or frequency.    I have reviewed the Medications, Allergies, Past Medical and Surgical History, and Social History in the [employer] system.  PAST MEDICAL HISTORY:   Past Medical History:   Diagnosis Date     Anemia      Anxiety      Bleeding disorder (H)      Blood clotting disorder (H)      Cerebral infarction (H) 2015     Cognitive developmental delay     low IQ. 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 (H)     right hand contractures     Iron overload due to repeated red blood cell transfusions      Migraines      Oppositional defiant behavior      Uncomplicated asthma        PAST SURGICAL HISTORY:   Past Surgical History:   Procedure Laterality Date     AS INSERT TUNNELED CV 2 CATH W/O PORT/PUMP       C BREAST REDUCTION (INCLUDES LIPO) TIER 3 Bilateral 04/23/2019     IR CVC NON TUNNEL PLACEMENT  4/7/2020     REPAIR TENDON ELBOW  Right 10/2/2019    Procedure: Right Elbow Flexor Lengthening, Flexor Pronator Slide Of Wrist and Finger, Thumb Adductor Lengthening;  Surgeon: Anai Franco MD;  Location: [facility] OR     TONSILLECTOMY Bilateral 10/2/2019    Procedure: Bilateral Tonsillectomy;  Surgeon: Farhana Guy MD;  Location: [facility] OR       Past medical history, past surgical history, medications, and allergies were reviewed with the patient. Additional pertinent items: None    FAMILY HISTORY:   Family History   Problem Relation Age of Onset     Sickle Cell Trait Mother      Sickle Cell Trait Father        SOCIAL HISTORY:   Social History     Tobacco Use     Smoking status: Never Smoker     Smokeless tobacco: Never Used   Substance Use Topics     Alcohol use: Not Currently     Alcohol/week: 0.0 standard drinks     Social history was reviewed with the patient. Additional pertinent items: None      Patient's Medications   New Prescriptions    No medications on file   Previous Medications    ACETAMINOPHEN (TYLENOL) 325 MG TABLET    Take 2 tablets (650 mg) by mouth every 6 hours as needed for mild pain    ALBUTEROL (PROAIR HFA/PROVENTIL HFA/VENTOLIN HFA) 108 (90 BASE) MCG/ACT INHALER    Inhale 2 puffs into the lungs every 6 hours as needed for shortness of breath / dyspnea or wheezing    ALBUTEROL (PROVENTIL) (2.5 MG/3ML) 0.083% NEB SOLUTION    Take 1 vial (2.5 mg) by nebulization every 6 hours as needed for shortness of breath / dyspnea or wheezing    ASPIRIN (ASPIRIN) 81 MG EC TABLET    Take 1 tablet (81 mg) by mouth daily    BUDESONIDE-FORMOTEROL (SYMBICORT) 160-4.5 MCG/ACT INHALER    Inhale 2 puffs into the lungs 2 times daily    CELECOXIB (CELEBREX) 100 MG CAPSULE    Take 1 capsule (100 mg) by mouth 2 times daily    DIPHENHYDRAMINE (BENADRYL) 25 MG CAPSULE    Take 1-2 capsules (25-50 mg) by mouth nightly as needed for sleep    HYDROXYUREA (HYDREA) 500 MG CAPSULE    Take 4 capsules (2,000 mg) by mouth daily    HYDROXYUREA 1000  "MG TABS    Take 2,000 mg by mouth daily    IBUPROFEN (ADVIL/MOTRIN) 200 MG TABLET    Take 3 tablets (600 mg) by mouth every 6 hours as needed for moderate pain    JADENU 360 MG TABLET    Take 4 tablets (1,440 mg) by mouth daily    MEDROXYPROGESTERONE (DEPO-PROVERA) 150 MG/ML IM INJECTION    Inject 150 mg into the muscle    NALOXONE (NARCAN) 4 MG/0.1ML NASAL SPRAY    Spray 1 spray (4 mg) into one nostril alternating nostrils once as needed for opioid reversal every 2-3 minutes until assistance arrives    ONDANSETRON (ZOFRAN) 8 MG TABLET        OXYCODONE IR (ROXICODONE) 10 MG TABLET    Take 1 tablet (10mg) by mouth every 6 hours for pain. If, after 2 doses it is not working, try a dose at 4 hours and call clinic.    SERTRALINE (ZOLOFT) 100 MG TABLET    Take 2 tablets (200 mg) by mouth daily   Modified Medications    No medications on file   Discontinued Medications    No medications on file          Allergies   Allergen Reactions     Fish-Derived Products Hives     Seafood Hives     Contrast Dye      Diagnostic X-Ray Materials      Gadolinium         Review of Systems   Constitutional: Negative for fever.   HENT: Negative for congestion.    Eyes: Negative for redness.   Respiratory: Negative for shortness of breath.    Cardiovascular: Positive for chest pain.   Gastrointestinal: Negative for abdominal pain.   Genitourinary: Negative for difficulty urinating.   Musculoskeletal: Positive for back pain. Negative for arthralgias and neck stiffness.   Skin: Negative for color change.   Neurological: Negative for headaches.   Psychiatric/Behavioral: Negative for confusion.   All other systems reviewed and are negative.    A complete review of systems was performed with pertinent positives and negatives noted in the HPI, and all other systems negative.    Physical Exam   BP: 137/73  Pulse: 110  Temp: 98  F (36.7  C)  Resp: 18  Height: 162.6 cm (5' 4\")  Weight: 73.5 kg (162 lb)  SpO2: 95 %      Physical Exam  Vitals signs and " nursing note reviewed.   Constitutional:       General: She is in acute distress.      Appearance: She is well-developed. She is not diaphoretic.   HENT:      Head: Atraumatic.      Mouth/Throat:      Pharynx: No oropharyngeal exudate.   Eyes:      General: No scleral icterus.     Pupils: Pupils are equal, round, and reactive to light.   Cardiovascular:      Heart sounds: Normal heart sounds.   Pulmonary:      Effort: Pulmonary effort is normal. No respiratory distress.      Breath sounds: Normal breath sounds.   Abdominal:      General: Bowel sounds are normal.      Palpations: Abdomen is soft.      Tenderness: There is no abdominal tenderness.   Musculoskeletal:         General: No tenderness.      Right lower leg: She exhibits no tenderness. No edema.      Left lower leg: She exhibits no tenderness. No edema.   Skin:     General: Skin is warm and dry.      Findings: No rash.   Neurological:      Mental Status: She is alert.         ED Course        Procedures             EKG Interpretation:      Interpreted by LISA WORLEY MD, MD  Time reviewed: 0000  Symptoms at time of EKG: chest pain   Rhythm: sinus tachycardia  Rate: 109  Axis: Normal  Ectopy: none  Conduction: normal  ST Segments/ T Waves: No acute ischemic changes  Q Waves: none  Comparison to prior: No old EKG available    Clinical Impression: sinus tachycardia    Results for orders placed or performed during the hospital encounter of 01/23/21   XR Chest Port 1 View     Status: None    Narrative    EXAM: XR CHEST PORTABLE 1 VIEW  LOCATION: [facility]  DATE/TIME: 01/23/2021, 3:41 AM    INDICATION: Chest pain.  COMPARISON: 12/10/2020.      Impression    IMPRESSION: Left-sided Port-A-Cath in place with tip over the SVC. Normal heart size and pulmonary vascularity. Lungs are clear. No significant bony abnormalities. Chest is otherwise negative. No acute findings.     CBC with platelets differential     Status: Abnormal   Result Value Ref Range     WBC 14.2 (H) 4.0 - 11.0 10e9/L    RBC Count 2.44 (L) 3.8 - 5.2 10e12/L    Hemoglobin 7.8 (L) 11.7 - 15.7 g/dL    Hematocrit 22.4 (L) 35.0 - 47.0 %    MCV 92 78 - 100 fl    MCH 32.0 26.5 - 33.0 pg    MCHC 34.8 31.5 - 36.5 g/dL    RDW 19.4 (H) 10.0 - 15.0 %    Platelet Count 361 150 - 450 10e9/L    Diff Method Automated Method     % Neutrophils 54.4 %    % Lymphocytes 30.1 %    % Monocytes 6.9 %    % Eosinophils 6.3 %    % Basophils 1.9 %    % Immature Granulocytes 0.4 %    Nucleated RBCs 2 (H) 0 /100    Absolute Neutrophil 7.7 1.6 - 8.3 10e9/L    Absolute Lymphocytes 4.3 0.8 - 5.3 10e9/L    Absolute Monocytes 1.0 0.0 - 1.3 10e9/L    Absolute Eosinophils 0.9 (H) 0.0 - 0.7 10e9/L    Absolute Basophils 0.3 (H) 0.0 - 0.2 10e9/L    Abs Immature Granulocytes 0.1 0 - 0.4 10e9/L    Absolute Nucleated RBC 0.3    Comprehensive metabolic panel     Status: Abnormal   Result Value Ref Range    Sodium 138 133 - 144 mmol/L    Potassium 3.8 3.4 - 5.3 mmol/L    Chloride 110 (H) 94 - 109 mmol/L    Carbon Dioxide 22 20 - 32 mmol/L    Anion Gap 6 3 - 14 mmol/L    Glucose 86 70 - 99 mg/dL    Urea Nitrogen 13 7 - 30 mg/dL    Creatinine 0.71 0.52 - 1.04 mg/dL    GFR Estimate >90 >60 mL/min/[1.73_m2]    GFR Estimate If Black >90 >60 mL/min/[1.73_m2]    Calcium 8.8 8.5 - 10.1 mg/dL    Bilirubin Total 3.2 (H) 0.2 - 1.3 mg/dL    Albumin 4.2 3.4 - 5.0 g/dL    Protein Total 8.1 6.8 - 8.8 g/dL    Alkaline Phosphatase 72 40 - 150 U/L    ALT 76 (H) 0 - 50 U/L    AST 86 (H) 0 - 45 U/L   HCG qualitative Blood     Status: None   Result Value Ref Range    HCG Qualitative Serum Negative NEG^Negative   Troponin I     Status: None   Result Value Ref Range    Troponin I ES <0.015 0.000 - 0.045 ug/L   Nt probnp inpatient (BNP)     Status: None   Result Value Ref Range    N-Terminal Pro BNP Inpatient 92 0 - 450 pg/mL   Reticulocyte count     Status: Abnormal   Result Value Ref Range    % Retic 23.3 (H) 0.5 - 2.0 %    Absolute Retic 568.0 (H) 25 - 95 10e9/L    EKG 12-lead, tracing only     Status: None (Preliminary result)   Result Value Ref Range    Interpretation ECG Click View Image link to view waveform and result                               Results for orders placed or performed during the hospital encounter of 01/23/21 (from the past 24 hour(s))   EKG 12-lead, tracing only   Result Value Ref Range    Interpretation ECG Click View Image link to view waveform and result    CBC with platelets differential   Result Value Ref Range    WBC 14.2 (H) 4.0 - 11.0 10e9/L    RBC Count 2.44 (L) 3.8 - 5.2 10e12/L    Hemoglobin 7.8 (L) 11.7 - 15.7 g/dL    Hematocrit 22.4 (L) 35.0 - 47.0 %    MCV 92 78 - 100 fl    MCH 32.0 26.5 - 33.0 pg    MCHC 34.8 31.5 - 36.5 g/dL    RDW 19.4 (H) 10.0 - 15.0 %    Platelet Count 361 150 - 450 10e9/L    Diff Method Automated Method     % Neutrophils 54.4 %    % Lymphocytes 30.1 %    % Monocytes 6.9 %    % Eosinophils 6.3 %    % Basophils 1.9 %    % Immature Granulocytes 0.4 %    Nucleated RBCs 2 (H) 0 /100    Absolute Neutrophil 7.7 1.6 - 8.3 10e9/L    Absolute Lymphocytes 4.3 0.8 - 5.3 10e9/L    Absolute Monocytes 1.0 0.0 - 1.3 10e9/L    Absolute Eosinophils 0.9 (H) 0.0 - 0.7 10e9/L    Absolute Basophils 0.3 (H) 0.0 - 0.2 10e9/L    Abs Immature Granulocytes 0.1 0 - 0.4 10e9/L    Absolute Nucleated RBC 0.3    Comprehensive metabolic panel   Result Value Ref Range    Sodium 138 133 - 144 mmol/L    Potassium 3.8 3.4 - 5.3 mmol/L    Chloride 110 (H) 94 - 109 mmol/L    Carbon Dioxide 22 20 - 32 mmol/L    Anion Gap 6 3 - 14 mmol/L    Glucose 86 70 - 99 mg/dL    Urea Nitrogen 13 7 - 30 mg/dL    Creatinine 0.71 0.52 - 1.04 mg/dL    GFR Estimate >90 >60 mL/min/[1.73_m2]    GFR Estimate If Black >90 >60 mL/min/[1.73_m2]    Calcium 8.8 8.5 - 10.1 mg/dL    Bilirubin Total 3.2 (H) 0.2 - 1.3 mg/dL    Albumin 4.2 3.4 - 5.0 g/dL    Protein Total 8.1 6.8 - 8.8 g/dL    Alkaline Phosphatase 72 40 - 150 U/L    ALT 76 (H) 0 - 50 U/L    AST 86 (H) 0 - 45 U/L   HCG  qualitative Blood   Result Value Ref Range    HCG Qualitative Serum Negative NEG^Negative   Troponin I   Result Value Ref Range    Troponin I ES <0.015 0.000 - 0.045 ug/L   Nt probnp inpatient (BNP)   Result Value Ref Range    N-Terminal Pro BNP Inpatient 92 0 - 450 pg/mL   Reticulocyte count   Result Value Ref Range    % Retic 23.3 (H) 0.5 - 2.0 %    Absolute Retic 568.0 (H) 25 - 95 10e9/L   XR Chest Port 1 View    Narrative    EXAM: XR CHEST PORTABLE 1 VIEW  LOCATION: [facility]  DATE/TIME: 01/23/2021, 3:41 AM    INDICATION: Chest pain.  COMPARISON: 12/10/2020.      Impression    IMPRESSION: Left-sided Port-A-Cath in place with tip over the SVC. Normal heart size and pulmonary vascularity. Lungs are clear. No significant bony abnormalities. Chest is otherwise negative. No acute findings.       Medications   heparin 100 UNIT/ML injection 5 mL (has no administration in time range)   0.9% sodium chloride BOLUS (0 mLs Intravenous Stopped 1/23/21 0416)   ketorolac (TORADOL) injection 30 mg (30 mg Intravenous Given 1/23/21 0253)   morphine (PF) injection 2 mg (2 mg Intravenous Given 1/23/21 0555)   diphenhydrAMINE (BENADRYL) capsule 50 mg (50 mg Oral Given 1/23/21 0253)             Assessments & Plan (with Medical Decision Making)   20 year old female with history of sickle cell disease to the emergency department with pain crisis.  She has her typical pain in the shoulders and back.  Patient also has some central chest pain today as well without any fever, cough, or dyspnea.  She has mild tachycardia but no hypoxia.  She does not have any leg pain or swelling.  The patient has a mild leukocytosis again today.  She periodically does have that.  She does not have any infectious symptoms.  Her chest radiograph does not have any infiltrate do not suspect acute chest syndrome.  EKG and troponin are normal so do not suspect ACS.  Her labs are otherwise at their baseline.  She was treated with her typical pain  protocol with good control of her symptoms.  She was subsequently requesting discharge to home.  Her vital signs normalized.  She will be discharged with plan for patient to follow-up with her hematologist next week.    I have reviewed the nursing notes.    I have reviewed the findings, diagnosis, plan and need for follow up with the patient.    New Prescriptions    No medications on file       Final diagnoses:   Sickle cell pain crisis (H)       1/22/2021   [facility] EMERGENCY DEPARTMENT     Francesco Green MD  01/23/21 0619       Francesco Green MD  01/23/21 0619

## 2021-01-26 ENCOUNTER — TELEPHONE (OUTPATIENT)
Dept: ONCOLOGY | Facility: CLINIC | Age: 21
End: 2021-01-26

## 2021-01-26 NOTE — TELEPHONE ENCOUNTER
[facility] Telephone Triage Note    The following symptoms were reported:     Description:            Onset:  Started today   Location: Arms legs, lower back  Character: Sharp with pressure, like someone is putting pressure on pain spot           Intensity: 8.5/10    Accompanying Signs & Symptoms:  none    Chest Pain:  denies     Shortness of Breath:  denies     Fever:  denies     Chills:  deines   Cough/sore throat:  denies  Other:  denies    Therapies Tried and outcome: pain meds, hot shower, heat pads, resting, hydrating, alternating IBUprofen and Tylenol and taking other medications    Improved by:   Sometimes warm shower, but once feels cold pain comes back.     Asking if able to get infusion today or if should go to ED.   Would take 20-30 minutes.     Last visit to ED visits on 1/20, 1/21 and on 1/23. Scheduled to see PAMELA Suresh on 2/12/21.     The following provider was consulted:  02:19 PM  /Central  01/26/2021 Perla  Try to schedule for infusion tomorrow and keep patient out of ED.     The following advice/orders were given, and/or interventions recommended:  Labs IVF/Pain infusion for this afternoon or tomorrow.       Patient instructions and/or follow up:  Called and relayed information to Pt, who verbalized understanding and will try to manage at home until get infusion by tomorrow.   Labs & IVF/Pain scheduled for tomorrow 1/27 at 8:00am. Patient is able to arrange transportation for herself. & Thanked this writer.

## 2021-01-27 ENCOUNTER — INFUSION THERAPY VISIT (OUTPATIENT)
Dept: ONCOLOGY | Facility: CLINIC | Age: 21
End: 2021-01-27
Attending: PEDIATRICS
Payer: COMMERCIAL

## 2021-01-27 VITALS
BODY MASS INDEX: 27.69 KG/M2 | RESPIRATION RATE: 18 BRPM | SYSTOLIC BLOOD PRESSURE: 142 MMHG | WEIGHT: 161.3 LBS | HEART RATE: 98 BPM | TEMPERATURE: 98.8 F | OXYGEN SATURATION: 99 % | DIASTOLIC BLOOD PRESSURE: 85 MMHG

## 2021-01-27 DIAGNOSIS — Z53.9 ERRONEOUS ENCOUNTER--DISREGARD: Primary | ICD-10-CM

## 2021-01-27 DIAGNOSIS — Z86.73 HISTORY OF STROKE: ICD-10-CM

## 2021-01-27 DIAGNOSIS — D57.1 HB-SS DISEASE WITHOUT CRISIS (H): Primary | ICD-10-CM

## 2021-01-27 DIAGNOSIS — D57.00 SICKLE CELL PAIN CRISIS (H): ICD-10-CM

## 2021-01-27 LAB
ABO + RH BLD: NORMAL
ABO + RH BLD: NORMAL
ALBUMIN SERPL-MCNC: 4.1 G/DL (ref 3.4–5)
ALP SERPL-CCNC: 74 U/L (ref 40–150)
ALT SERPL W P-5'-P-CCNC: 84 U/L (ref 0–50)
ANION GAP SERPL CALCULATED.3IONS-SCNC: 6 MMOL/L (ref 3–14)
AST SERPL W P-5'-P-CCNC: 93 U/L (ref 0–45)
BASOPHILS # BLD AUTO: 0.2 10E9/L (ref 0–0.2)
BASOPHILS NFR BLD AUTO: 1.6 %
BILIRUB SERPL-MCNC: 3.8 MG/DL (ref 0.2–1.3)
BLD GP AB SCN SERPL QL: NORMAL
BLOOD BANK CMNT PATIENT-IMP: NORMAL
BUN SERPL-MCNC: 9 MG/DL (ref 7–30)
CALCIUM SERPL-MCNC: 8.6 MG/DL (ref 8.5–10.1)
CHLORIDE SERPL-SCNC: 113 MMOL/L (ref 94–109)
CO2 SERPL-SCNC: 22 MMOL/L (ref 20–32)
CREAT SERPL-MCNC: 0.54 MG/DL (ref 0.52–1.04)
DIFFERENTIAL METHOD BLD: ABNORMAL
EOSINOPHIL # BLD AUTO: 0.8 10E9/L (ref 0–0.7)
EOSINOPHIL NFR BLD AUTO: 6.4 %
ERYTHROCYTE [DISTWIDTH] IN BLOOD BY AUTOMATED COUNT: 21 % (ref 10–15)
FERRITIN SERPL-MCNC: ABNORMAL NG/ML (ref 12–150)
GFR SERPL CREATININE-BSD FRML MDRD: >90 ML/MIN/{1.73_M2}
GLUCOSE SERPL-MCNC: 96 MG/DL (ref 70–99)
HCT VFR BLD AUTO: 23.2 % (ref 35–47)
HGB BLD-MCNC: 8.1 G/DL (ref 11.7–15.7)
IMM GRANULOCYTES # BLD: 0.1 10E9/L (ref 0–0.4)
IMM GRANULOCYTES NFR BLD: 0.4 %
LYMPHOCYTES # BLD AUTO: 2.9 10E9/L (ref 0.8–5.3)
LYMPHOCYTES NFR BLD AUTO: 24 %
MCH RBC QN AUTO: 32.5 PG (ref 26.5–33)
MCHC RBC AUTO-ENTMCNC: 34.9 G/DL (ref 31.5–36.5)
MCV RBC AUTO: 93 FL (ref 78–100)
MONOCYTES # BLD AUTO: 0.9 10E9/L (ref 0–1.3)
MONOCYTES NFR BLD AUTO: 7.7 %
NEUTROPHILS # BLD AUTO: 7.3 10E9/L (ref 1.6–8.3)
NEUTROPHILS NFR BLD AUTO: 59.9 %
NRBC # BLD AUTO: 0.6 10*3/UL
NRBC BLD AUTO-RTO: 5 /100
PLATELET # BLD AUTO: 347 10E9/L (ref 150–450)
POTASSIUM SERPL-SCNC: 3.7 MMOL/L (ref 3.4–5.3)
PROT SERPL-MCNC: 7.8 G/DL (ref 6.8–8.8)
RBC # BLD AUTO: 2.49 10E12/L (ref 3.8–5.2)
SODIUM SERPL-SCNC: 140 MMOL/L (ref 133–144)
SPECIMEN EXP DATE BLD: NORMAL
WBC # BLD AUTO: 12.2 10E9/L (ref 4–11)

## 2021-01-27 PROCEDURE — 82728 ASSAY OF FERRITIN: CPT | Performed by: PEDIATRICS

## 2021-01-27 PROCEDURE — 250N000011 HC RX IP 250 OP 636: Performed by: PHYSICIAN ASSISTANT

## 2021-01-27 PROCEDURE — 96361 HYDRATE IV INFUSION ADD-ON: CPT

## 2021-01-27 PROCEDURE — 80053 COMPREHEN METABOLIC PANEL: CPT | Performed by: PEDIATRICS

## 2021-01-27 PROCEDURE — 96376 TX/PRO/DX INJ SAME DRUG ADON: CPT

## 2021-01-27 PROCEDURE — 96374 THER/PROPH/DIAG INJ IV PUSH: CPT

## 2021-01-27 PROCEDURE — 85025 COMPLETE CBC W/AUTO DIFF WBC: CPT | Performed by: PEDIATRICS

## 2021-01-27 PROCEDURE — 250N000011 HC RX IP 250 OP 636: Performed by: PEDIATRICS

## 2021-01-27 PROCEDURE — 86901 BLOOD TYPING SEROLOGIC RH(D): CPT | Performed by: PEDIATRICS

## 2021-01-27 PROCEDURE — 86850 RBC ANTIBODY SCREEN: CPT | Performed by: PEDIATRICS

## 2021-01-27 PROCEDURE — 86900 BLOOD TYPING SEROLOGIC ABO: CPT | Performed by: PEDIATRICS

## 2021-01-27 PROCEDURE — 258N000003 HC RX IP 258 OP 636: Performed by: PHYSICIAN ASSISTANT

## 2021-01-27 PROCEDURE — 999N000130 HC STATISTIC PORT-A-CATH ACCESS/FLUSHING

## 2021-01-27 RX ORDER — MORPHINE SULFATE 2 MG/ML
2 INJECTION, SOLUTION INTRAMUSCULAR; INTRAVENOUS
Status: DISCONTINUED | OUTPATIENT
Start: 2021-01-27 | End: 2021-01-27 | Stop reason: HOSPADM

## 2021-01-27 RX ORDER — HEPARIN SODIUM,PORCINE 10 UNIT/ML
5 VIAL (ML) INTRAVENOUS
Status: CANCELLED | OUTPATIENT
Start: 2021-01-27

## 2021-01-27 RX ORDER — HEPARIN SODIUM (PORCINE) LOCK FLUSH IV SOLN 100 UNIT/ML 100 UNIT/ML
5 SOLUTION INTRAVENOUS
Status: DISCONTINUED | OUTPATIENT
Start: 2021-01-27 | End: 2021-01-27 | Stop reason: HOSPADM

## 2021-01-27 RX ORDER — DIPHENHYDRAMINE HCL 25 MG
25 CAPSULE ORAL
Status: CANCELLED
Start: 2021-01-27

## 2021-01-27 RX ORDER — ONDANSETRON 8 MG/1
8 TABLET, FILM COATED ORAL
Status: CANCELLED
Start: 2021-01-27

## 2021-01-27 RX ORDER — HEPARIN SODIUM (PORCINE) LOCK FLUSH IV SOLN 100 UNIT/ML 100 UNIT/ML
5 SOLUTION INTRAVENOUS
Status: CANCELLED | OUTPATIENT
Start: 2021-01-27

## 2021-01-27 RX ORDER — MORPHINE SULFATE 2 MG/ML
2 INJECTION, SOLUTION INTRAMUSCULAR; INTRAVENOUS
Status: CANCELLED
Start: 2021-01-27

## 2021-01-27 RX ADMIN — MORPHINE SULFATE 2 MG: 2 INJECTION, SOLUTION INTRAMUSCULAR; INTRAVENOUS at 10:44

## 2021-01-27 RX ADMIN — MORPHINE SULFATE 2 MG: 2 INJECTION, SOLUTION INTRAMUSCULAR; INTRAVENOUS at 08:39

## 2021-01-27 RX ADMIN — Medication 5 ML: at 10:47

## 2021-01-27 RX ADMIN — DEXTROSE AND SODIUM CHLORIDE 500 ML: 5; 450 INJECTION, SOLUTION INTRAVENOUS at 08:37

## 2021-01-27 RX ADMIN — MORPHINE SULFATE 2 MG: 2 INJECTION, SOLUTION INTRAMUSCULAR; INTRAVENOUS at 09:48

## 2021-01-27 ASSESSMENT — PAIN SCALES - GENERAL
PAINLEVEL: SEVERE PAIN (7)
PAINLEVEL: EXTREME PAIN (8)

## 2021-01-27 NOTE — LETTER
1/27/2021         RE: Jennifer Cervantes  [address]  [place] [facility] [address]        Dear Colleague,    Thank you for referring your patient, Jennifer Cervantes, to the [facility]. Please see a copy of my visit note below.      This encounter was opened in error. Please disregard.      Again, thank you for allowing me to participate in the care of your patient.        Sincerely,        [facility] Lab Draw

## 2021-01-27 NOTE — PROGRESS NOTES
Chief Complaint   Patient presents with     Blood Draw     Port draw via RN, vitals taken by CMA and checked into next appointment     -Arely GUILLEN CMA

## 2021-01-27 NOTE — PROGRESS NOTES
Infusion Nursing Note:  Jennifer Cervantes presents today for IVF and Pain Medications.    Patient seen by provider today: No   present during visit today: Not Applicable.    Note: Pt arrives to infusion today rating her low back and bilateral arm pain to be a 9/10. She took her last Oxycodone at 0600 this morning. Denies any fevers, chills, chest pain, SOB, cough, constipation, nausea. States her asthma is currently under control.     Pt declined heat packs, Benadryl and Zofran.     Intravenous Access:  Implanted Port.    Treatment Conditions:  Lab Results   Component Value Date    HGB 8.1 01/27/2021     Lab Results   Component Value Date    WBC 12.2 01/27/2021      Lab Results   Component Value Date    ANEU 7.3 01/27/2021     Lab Results   Component Value Date     01/27/2021      Lab Results   Component Value Date     01/27/2021                   Lab Results   Component Value Date    POTASSIUM 3.7 01/27/2021           Lab Results   Component Value Date    MAG 1.4 12/10/2020            Lab Results   Component Value Date    CR 0.54 01/27/2021                   Lab Results   Component Value Date    MICAH 8.6 01/27/2021                Lab Results   Component Value Date    BILITOTAL 3.8 01/27/2021           Lab Results   Component Value Date    ALBUMIN 4.1 01/27/2021                    Lab Results   Component Value Date    ALT 84 01/27/2021           Lab Results   Component Value Date    AST 93 01/27/2021     Results reviewed, no treatment parameters, ok to proceed with treatment.    Post Infusion Assessment:  Patient tolerated infusion without incident.  Blood return noted pre and post infusion.  Site patent and intact, free from redness, edema or discomfort.  No evidence of extravasations.  Access discontinued per protocol.     Discharge Plan:   Patient declined prescription refills.  AVS to patient via [employer].  Patient will return PRN for next appointment. She knows when to call triage line for  infusion appointments.   Patient discharged in stable condition accompanied by: self.  Departure Mode: Ambulatory.  Face to Face time: 0.    Zoraida Orozco RN

## 2021-01-29 ENCOUNTER — PRE VISIT (OUTPATIENT)
Dept: PULMONOLOGY | Facility: CLINIC | Age: 21
End: 2021-01-29

## 2021-01-29 ENCOUNTER — PATIENT OUTREACH (OUTPATIENT)
Dept: CARE COORDINATION | Facility: CLINIC | Age: 21
End: 2021-01-29

## 2021-01-29 ENCOUNTER — TELEPHONE (OUTPATIENT)
Dept: ONCOLOGY | Facility: CLINIC | Age: 21
End: 2021-01-29

## 2021-01-29 ENCOUNTER — INFUSION THERAPY VISIT (OUTPATIENT)
Dept: ONCOLOGY | Facility: CLINIC | Age: 21
End: 2021-01-29
Attending: PHYSICIAN ASSISTANT
Payer: COMMERCIAL

## 2021-01-29 VITALS
OXYGEN SATURATION: 98 % | RESPIRATION RATE: 18 BRPM | SYSTOLIC BLOOD PRESSURE: 102 MMHG | HEART RATE: 101 BPM | TEMPERATURE: 98.9 F | DIASTOLIC BLOOD PRESSURE: 75 MMHG

## 2021-01-29 DIAGNOSIS — D57.00 SICKLE CELL PAIN CRISIS (H): Primary | ICD-10-CM

## 2021-01-29 PROCEDURE — 250N000011 HC RX IP 250 OP 636: Performed by: PEDIATRICS

## 2021-01-29 PROCEDURE — 258N000003 HC RX IP 258 OP 636: Performed by: PHYSICIAN ASSISTANT

## 2021-01-29 PROCEDURE — 250N000011 HC RX IP 250 OP 636: Performed by: PHYSICIAN ASSISTANT

## 2021-01-29 RX ORDER — MORPHINE SULFATE 2 MG/ML
2 INJECTION, SOLUTION INTRAMUSCULAR; INTRAVENOUS
Status: CANCELLED
Start: 2021-01-29

## 2021-01-29 RX ORDER — HEPARIN SODIUM (PORCINE) LOCK FLUSH IV SOLN 100 UNIT/ML 100 UNIT/ML
5 SOLUTION INTRAVENOUS
Status: DISCONTINUED | OUTPATIENT
Start: 2021-01-29 | End: 2021-01-29 | Stop reason: HOSPADM

## 2021-01-29 RX ORDER — MORPHINE SULFATE 2 MG/ML
2 INJECTION, SOLUTION INTRAMUSCULAR; INTRAVENOUS
Status: DISCONTINUED | OUTPATIENT
Start: 2021-01-29 | End: 2021-01-29 | Stop reason: HOSPADM

## 2021-01-29 RX ORDER — HEPARIN SODIUM,PORCINE 10 UNIT/ML
5 VIAL (ML) INTRAVENOUS
Status: CANCELLED | OUTPATIENT
Start: 2021-01-29

## 2021-01-29 RX ORDER — DIPHENHYDRAMINE HCL 25 MG
25 CAPSULE ORAL
Status: CANCELLED
Start: 2021-01-29

## 2021-01-29 RX ORDER — HEPARIN SODIUM (PORCINE) LOCK FLUSH IV SOLN 100 UNIT/ML 100 UNIT/ML
5 SOLUTION INTRAVENOUS
Status: CANCELLED | OUTPATIENT
Start: 2021-01-29

## 2021-01-29 RX ORDER — ONDANSETRON 8 MG/1
8 TABLET, FILM COATED ORAL
Status: CANCELLED
Start: 2021-01-29

## 2021-01-29 RX ADMIN — MORPHINE SULFATE 2 MG: 2 INJECTION, SOLUTION INTRAMUSCULAR; INTRAVENOUS at 13:34

## 2021-01-29 RX ADMIN — MORPHINE SULFATE 2 MG: 2 INJECTION, SOLUTION INTRAMUSCULAR; INTRAVENOUS at 14:29

## 2021-01-29 RX ADMIN — DEXTROSE AND SODIUM CHLORIDE 500 ML: 5; 450 INJECTION, SOLUTION INTRAVENOUS at 13:32

## 2021-01-29 RX ADMIN — MORPHINE SULFATE 2 MG: 2 INJECTION, SOLUTION INTRAMUSCULAR; INTRAVENOUS at 15:26

## 2021-01-29 RX ADMIN — Medication 5 ML: at 15:40

## 2021-01-29 NOTE — PROGRESS NOTES
Infusion Nursing Note:  Jennifer Cervantes presents today for IVF/Pain medication.    Patient seen by provider today: No   present during visit today: Not Applicable.    Note: Patient arrived at infusion. Endorses non radiating constant sharp lower back pain with PS 9/10. States that it is her usual sickle cell pain.  Denies fever/chills. Denies nausea/vomiting nor chest and abdominal discomfort. Refused Benadryl and Zofran. No new concerns made. Otherwise well.    Patient had total of 3 doses of IV Morphine with 3/10 PS. Patient stated that she felt comfortable going home today. Instructed patient if symptom persists to call triage. Verbalized understanding.     Patient did meet criteria for an asymptomatic covid-19 PCR test in infusion today. Patient  accepted the covid-19 test.    Intravenous Access:  Implanted Port.    Treatment Conditions:  Not Applicable.      Post Infusion Assessment:  Patient tolerated infusion without incident.  Blood return noted pre and post infusion.  Site patent and intact, free from redness, edema or discomfort.  No evidence of extravasations.  Access discontinued per protocol.       Discharge Plan:   Patient declined prescription refills.  Discharge instructions reviewed with: Patient.  Patient and/or family verbalized understanding of discharge instructions and all questions answered.  AVS to patient via [employer].  Patient will return call for next appointment.   Patient discharged in stable condition accompanied by: self.  Departure Mode: Ambulatory.      GLORIA YANCEY RN

## 2021-01-29 NOTE — TELEPHONE ENCOUNTER
"[facility] Telephone Triage Note    The following symptoms were reported:   \"Regular sickle cell pain\"    Description:            Onset:  Does not remember   Location: Both arms and lower back  Character: Sharp           Intensity: 9/10    Accompanying Signs & Symptoms:  none    Chest Pain:  denies     Shortness of Breath:  denies     Fever:  Denies     Chills:  denies   Cough/sore throat:  denies  Other:  Denies other symptoms    Therapies Tried and outcome: Just took Oxycodone medications and shower and now sitting down/resting.  Has been taking 1 tablet every 6 hours for the last 24hours, also taking IBUprofen and Tylenol every 6 hours.      Improved by: just a little with shower/rest.     The following provider was consulted:    ANDREAS Andrei Machado     The following advice/orders were given, and/or interventions recommended:  No labs needed, approved IVF/Pain if able to get in today or ED over weekend.Provider scheduled to     Patient instructions and/or follow up: Called and relayed information to Pt, who verbalized understanding.  Needs transportation to get to infusion clinic by 1pm today.       Abdullahi contacted to help patient arranged for transportation and will call pt when arranged.     Scheduling notifed  "

## 2021-01-29 NOTE — PROGRESS NOTES
Social Work Follow-Up  [facility]    Data/Intervention:  Patient Name:  Jennifer Cervantes  /Age:  2000 (20 year old)    Reason for Follow-Up:  Transportation Assistance     Collaborated With:    -JOE Rodriguez  -[employer]    Intervention/Education/Resources Provided:  SW received a call from Jennifer stating that pt had a last minute appointment scheduled for today at 1 pm and asked to help set ride up for pt. SW called [employer] and got ride set up for pt through [employer] ([phone]). They will be pick her up around 12:30pm and will be will call return ride. SW relayed this to pt and pt stated that they have the number to call for the return ride. SW relayed this information to Jennifer as well.   Assessment/Plan:  Previously provided patient/family with writer's contact information and availability. SW to remain available as needed.       CARLOS Chavez,LGSW  Hematology/Oncology Social Worker  Phone:[phone] Pager: [phone]

## 2021-01-29 NOTE — PATIENT INSTRUCTIONS
Contact Numbers  [facility]: [phone]    After Hours:  [phone]  Triage: [phone]    Please call the [facility] Triage line if you experience a temperature greater than or equal to 100.5, shaking chills, have uncontrolled nausea, vomiting and/or diarrhea, dizziness, shortness of breath, chest pain, bleeding, unexplained bruising, or if you have any other new/concerning symptoms, questions or concerns.     If it is after hours, weekends, or holidays, please call the main hospital  at  [phone] and ask to speak to the Oncology doctor on call.     If you are having any concerning symptoms or wish to speak to a provider before your next infusion visit, please call your care coordinator or triage to notify them so we can adequately serve you.     If you need a refill on a narcotic prescription or other medication, please call triage before your infusion appointment.         January 2021 Sunday Monday Tuesday Wednesday Thursday Friday Saturday                            1     2       3     4    Admission   6:17 PM   Melissa Larson MD   [facility] Emergency Department   (Discharge: 1/4/2021) 5    HAND FOLLOW UP   3:30 PM   (60 min.)   Franchesca Tucker OT   [facility] 6     7    [facility] ONC INFUSION 120   2:00 PM   (120 min.)    ONCOLOGY INFUSION   [facility] 8    [facility] ONC INFUSION 240   7:00 AM   (240 min.)    ONCOLOGY INFUSION   [facility] 9       10    Admission   8:34 PM   Jesus Joiner MD   [facility] Emergency Department   (Discharge: 1/10/2021) 11     12    Admission   6:22 PM   Mykel Luz DO   [facility] Emergency Department   (Discharge: 1/12/2021) 13    LAB   3:30 PM   (15 min.)   [facility]   [facility] [place]    Admission  10:43 PM   Francesco Green MD   [facility] Emergency Department   (Discharge: 1/14/2021)  14    Admission   7:17 PM   Jackie Dale MD   [facility] Emergency Department   (Discharge: 1/15/2021) 15    [facility] RETURN   2:45 PM   (30 min.)   Eric Duncan MD   [facility] 16       17     18    Admission   7:51 PM   Jesus Joiner MD   [facility] Emergency Department   (Discharge: 1/18/2021) 19     20    Admission   9:49 PM   Reta Miramontes MD   [facility] Emergency Department   (Discharge: 1/21/2021) 21    Admission   8:45 PM   Dmitri Thomas MD   [facility] Emergency Department   (Discharge: 1/22/2021) 22    [facility] FULL PULMONARY FUNCTION   9:45 AM   (60 min.)   [facility] PFL A   [facility] Function [facility] [place] 23    Admission   1:10 AM   Francesco Green MD   [facility] Emergency Department   (Discharge: 1/23/2021)    XR CHEST PORT 1 VIEW   3:45 AM   (20 min.)   [id number]   [facility] Imaging   24     25     26     27    LAB CENTRAL   7:30 AM   (15 min.)   [facility] DRAW   [facility] ONC INFUSION 120   8:00 AM   (120 min.)   [facility] ONCOLOGY INFUSION   [facility] 28     29    [facility] ONC INFUSION 120   1:00 PM   (120 min.)   [facility] ONCOLOGY INFUSION   [facility] NEW   2:05 PM   (80 min.)   Farhana Orozco MD   [facility] 30 31 February 2021 Sunday Monday Tuesday Wednesday Thursday Friday Saturday        1     2     3     4     5     6       7     8     9     10    LAB CENTRAL  10:45 AM   (15 min.)   [facility] DRAW   [facility] RETURN  10:55 AM   (50 min.)   Andrei Machado PA   [facility] 11    MR ABDOMEN WO   7:30 AM   (60 min.)   UUMR1   [facility] Imaging    MR MYOCARDIUM WO   9:00  AM   (105 min.)   UUMR4   [facility] 12     13       14     15     16     17     18     19     20       21     22     23     24     25     26     27       28                                                   Lab Results:  No results found for this or any previous visit (from the past 12 hour(s)).

## 2021-01-31 ENCOUNTER — APPOINTMENT (OUTPATIENT)
Dept: GENERAL RADIOLOGY | Facility: CLINIC | Age: 21
DRG: 176 | End: 2021-01-31
Attending: EMERGENCY MEDICINE
Payer: COMMERCIAL

## 2021-01-31 ENCOUNTER — HOSPITAL ENCOUNTER (EMERGENCY)
Facility: CLINIC | Age: 21
Discharge: HOME | DRG: 176 | End: 2021-02-01
Attending: EMERGENCY MEDICINE | Admitting: EMERGENCY MEDICINE
Payer: COMMERCIAL

## 2021-01-31 DIAGNOSIS — R07.9 CHEST PAIN, UNSPECIFIED TYPE: ICD-10-CM

## 2021-01-31 LAB
ANION GAP SERPL CALCULATED.3IONS-SCNC: 4 MMOL/L (ref 3–14)
BASOPHILS # BLD AUTO: 0.2 10E9/L (ref 0–0.2)
BASOPHILS NFR BLD AUTO: 1.8 %
BUN SERPL-MCNC: 11 MG/DL (ref 7–30)
CALCIUM SERPL-MCNC: 8.6 MG/DL (ref 8.5–10.1)
CHLORIDE SERPL-SCNC: 109 MMOL/L (ref 94–109)
CO2 SERPL-SCNC: 24 MMOL/L (ref 20–32)
CREAT SERPL-MCNC: 0.57 MG/DL (ref 0.52–1.04)
D DIMER PPP FEU-MCNC: 1.9 UG/ML FEU (ref 0–0.5)
DIFFERENTIAL METHOD BLD: ABNORMAL
EOSINOPHIL # BLD AUTO: 0.7 10E9/L (ref 0–0.7)
EOSINOPHIL NFR BLD AUTO: 5.4 %
ERYTHROCYTE [DISTWIDTH] IN BLOOD BY AUTOMATED COUNT: 21.4 % (ref 10–15)
GFR SERPL CREATININE-BSD FRML MDRD: >90 ML/MIN/{1.73_M2}
GLUCOSE SERPL-MCNC: 84 MG/DL (ref 70–99)
HCT VFR BLD AUTO: 23.2 % (ref 35–47)
HGB BLD-MCNC: 8 G/DL (ref 11.7–15.7)
IMM GRANULOCYTES # BLD: 0.1 10E9/L (ref 0–0.4)
IMM GRANULOCYTES NFR BLD: 0.5 %
LYMPHOCYTES # BLD AUTO: 3.1 10E9/L (ref 0.8–5.3)
LYMPHOCYTES NFR BLD AUTO: 23.7 %
MCH RBC QN AUTO: 32.4 PG (ref 26.5–33)
MCHC RBC AUTO-ENTMCNC: 34.5 G/DL (ref 31.5–36.5)
MCV RBC AUTO: 94 FL (ref 78–100)
MONOCYTES # BLD AUTO: 1.2 10E9/L (ref 0–1.3)
MONOCYTES NFR BLD AUTO: 9.2 %
NEUTROPHILS # BLD AUTO: 7.8 10E9/L (ref 1.6–8.3)
NEUTROPHILS NFR BLD AUTO: 59.4 %
NRBC # BLD AUTO: 0.3 10*3/UL
NRBC BLD AUTO-RTO: 3 /100
PLATELET # BLD AUTO: 329 10E9/L (ref 150–450)
POTASSIUM SERPL-SCNC: 3.8 MMOL/L (ref 3.4–5.3)
RBC # BLD AUTO: 2.47 10E12/L (ref 3.8–5.2)
RETICS # AUTO: 681.7 10E9/L (ref 25–95)
RETICS/RBC NFR AUTO: 27.6 % (ref 0.5–2)
SODIUM SERPL-SCNC: 136 MMOL/L (ref 133–144)
TROPONIN I SERPL-MCNC: <0.015 UG/L (ref 0–0.04)
WBC # BLD AUTO: 13.2 10E9/L (ref 4–11)

## 2021-01-31 PROCEDURE — 93005 ELECTROCARDIOGRAM TRACING: CPT | Performed by: EMERGENCY MEDICINE

## 2021-01-31 PROCEDURE — 93010 ELECTROCARDIOGRAM REPORT: CPT | Performed by: EMERGENCY MEDICINE

## 2021-01-31 PROCEDURE — 84484 ASSAY OF TROPONIN QUANT: CPT | Performed by: EMERGENCY MEDICINE

## 2021-01-31 PROCEDURE — 76604 US EXAM CHEST: CPT | Mod: 26 | Performed by: EMERGENCY MEDICINE

## 2021-01-31 PROCEDURE — 85025 COMPLETE CBC W/AUTO DIFF WBC: CPT | Performed by: EMERGENCY MEDICINE

## 2021-01-31 PROCEDURE — 99285 EMERGENCY DEPT VISIT HI MDM: CPT | Mod: 25 | Performed by: EMERGENCY MEDICINE

## 2021-01-31 PROCEDURE — 96374 THER/PROPH/DIAG INJ IV PUSH: CPT | Mod: 59 | Performed by: EMERGENCY MEDICINE

## 2021-01-31 PROCEDURE — 96361 HYDRATE IV INFUSION ADD-ON: CPT | Performed by: EMERGENCY MEDICINE

## 2021-01-31 PROCEDURE — 93308 TTE F-UP OR LMTD: CPT | Performed by: EMERGENCY MEDICINE

## 2021-01-31 PROCEDURE — 93308 TTE F-UP OR LMTD: CPT | Mod: 26 | Performed by: EMERGENCY MEDICINE

## 2021-01-31 PROCEDURE — 250N000011 HC RX IP 250 OP 636: Performed by: EMERGENCY MEDICINE

## 2021-01-31 PROCEDURE — 76604 US EXAM CHEST: CPT | Performed by: EMERGENCY MEDICINE

## 2021-01-31 PROCEDURE — 96376 TX/PRO/DX INJ SAME DRUG ADON: CPT | Performed by: EMERGENCY MEDICINE

## 2021-01-31 PROCEDURE — 71046 X-RAY EXAM CHEST 2 VIEWS: CPT

## 2021-01-31 PROCEDURE — 85045 AUTOMATED RETICULOCYTE COUNT: CPT | Performed by: EMERGENCY MEDICINE

## 2021-01-31 PROCEDURE — 85379 FIBRIN DEGRADATION QUANT: CPT | Performed by: EMERGENCY MEDICINE

## 2021-01-31 PROCEDURE — 258N000003 HC RX IP 258 OP 636: Performed by: EMERGENCY MEDICINE

## 2021-01-31 PROCEDURE — 80048 BASIC METABOLIC PNL TOTAL CA: CPT | Performed by: EMERGENCY MEDICINE

## 2021-01-31 PROCEDURE — 96375 TX/PRO/DX INJ NEW DRUG ADDON: CPT | Performed by: EMERGENCY MEDICINE

## 2021-01-31 PROCEDURE — 71046 X-RAY EXAM CHEST 2 VIEWS: CPT | Mod: 26 | Performed by: RADIOLOGY

## 2021-01-31 RX ORDER — SODIUM CHLORIDE, SODIUM LACTATE, POTASSIUM CHLORIDE, CALCIUM CHLORIDE 600; 310; 30; 20 MG/100ML; MG/100ML; MG/100ML; MG/100ML
1000 INJECTION, SOLUTION INTRAVENOUS CONTINUOUS
Status: DISCONTINUED | OUTPATIENT
Start: 2021-01-31 | End: 2021-02-01 | Stop reason: HOSPADM

## 2021-01-31 RX ORDER — DIPHENHYDRAMINE HCL 25 MG
25 CAPSULE ORAL
Status: DISCONTINUED | OUTPATIENT
Start: 2021-01-31 | End: 2021-02-01 | Stop reason: HOSPADM

## 2021-01-31 RX ORDER — MORPHINE SULFATE 4 MG/ML
2 INJECTION, SOLUTION INTRAMUSCULAR; INTRAVENOUS
Status: COMPLETED | OUTPATIENT
Start: 2021-01-31 | End: 2021-02-01

## 2021-01-31 RX ORDER — KETOROLAC TROMETHAMINE 30 MG/ML
30 INJECTION, SOLUTION INTRAMUSCULAR; INTRAVENOUS ONCE
Status: COMPLETED | OUTPATIENT
Start: 2021-01-31 | End: 2021-01-31

## 2021-01-31 RX ORDER — ONDANSETRON 2 MG/ML
4 INJECTION INTRAMUSCULAR; INTRAVENOUS
Status: DISCONTINUED | OUTPATIENT
Start: 2021-01-31 | End: 2021-02-01 | Stop reason: HOSPADM

## 2021-01-31 RX ADMIN — SODIUM CHLORIDE, POTASSIUM CHLORIDE, SODIUM LACTATE AND CALCIUM CHLORIDE 1000 ML: 600; 310; 30; 20 INJECTION, SOLUTION INTRAVENOUS at 20:52

## 2021-01-31 RX ADMIN — MORPHINE SULFATE 2 MG: 4 INJECTION, SOLUTION INTRAMUSCULAR; INTRAVENOUS at 22:23

## 2021-01-31 RX ADMIN — KETOROLAC TROMETHAMINE 30 MG: 30 INJECTION, SOLUTION INTRAMUSCULAR at 20:55

## 2021-01-31 RX ADMIN — SODIUM CHLORIDE, POTASSIUM CHLORIDE, SODIUM LACTATE AND CALCIUM CHLORIDE 1000 ML: 600; 310; 30; 20 INJECTION, SOLUTION INTRAVENOUS at 23:44

## 2021-01-31 RX ADMIN — MORPHINE SULFATE 2 MG: 4 INJECTION, SOLUTION INTRAMUSCULAR; INTRAVENOUS at 20:57

## 2021-01-31 ASSESSMENT — ENCOUNTER SYMPTOMS
FEVER: 0
COUGH: 0
ABDOMINAL PAIN: 0
SHORTNESS OF BREATH: 0
HEADACHES: 0
NAUSEA: 0
APPETITE CHANGE: 0
DYSURIA: 0
BACK PAIN: 1
VOMITING: 0

## 2021-02-01 ENCOUNTER — TELEPHONE (OUTPATIENT)
Dept: ONCOLOGY | Facility: CLINIC | Age: 21
End: 2021-02-01

## 2021-02-01 ENCOUNTER — APPOINTMENT (OUTPATIENT)
Dept: NUCLEAR MEDICINE | Facility: CLINIC | Age: 21
DRG: 176 | End: 2021-02-01
Attending: EMERGENCY MEDICINE
Payer: COMMERCIAL

## 2021-02-01 ENCOUNTER — PATIENT OUTREACH (OUTPATIENT)
Dept: ONCOLOGY | Facility: CLINIC | Age: 21
End: 2021-02-01

## 2021-02-01 ENCOUNTER — HOSPITAL ENCOUNTER (INPATIENT)
Facility: CLINIC | Age: 21
LOS: 2 days | Discharge: HOME | DRG: 176 | End: 2021-02-03
Attending: EMERGENCY MEDICINE | Admitting: STUDENT IN AN ORGANIZED HEALTH CARE EDUCATION/TRAINING PROGRAM
Payer: COMMERCIAL

## 2021-02-01 VITALS
TEMPERATURE: 97.3 F | RESPIRATION RATE: 19 BRPM | HEART RATE: 98 BPM | OXYGEN SATURATION: 98 % | SYSTOLIC BLOOD PRESSURE: 114 MMHG | DIASTOLIC BLOOD PRESSURE: 63 MMHG

## 2021-02-01 DIAGNOSIS — I26.99 OTHER ACUTE PULMONARY EMBOLISM WITHOUT ACUTE COR PULMONALE (H): Primary | ICD-10-CM

## 2021-02-01 DIAGNOSIS — I26.94 MULTIPLE SUBSEGMENTAL PULMONARY EMBOLI WITHOUT ACUTE COR PULMONALE (H): ICD-10-CM

## 2021-02-01 LAB
ALBUMIN SERPL-MCNC: 3.9 G/DL (ref 3.4–5)
ALBUMIN UR-MCNC: NEGATIVE MG/DL
ALP SERPL-CCNC: 71 U/L (ref 40–150)
ALT SERPL W P-5'-P-CCNC: 74 U/L (ref 0–50)
ANION GAP SERPL CALCULATED.3IONS-SCNC: 2 MMOL/L (ref 3–14)
APPEARANCE UR: CLEAR
APTT PPP: 49 SEC (ref 22–37)
AST SERPL W P-5'-P-CCNC: 84 U/L (ref 0–45)
BASOPHILS # BLD AUTO: 0.2 10E9/L (ref 0–0.2)
BASOPHILS NFR BLD AUTO: 1.5 %
BILIRUB SERPL-MCNC: 2.9 MG/DL (ref 0.2–1.3)
BILIRUB UR QL STRIP: NEGATIVE
BUN SERPL-MCNC: 10 MG/DL (ref 7–30)
CALCIUM SERPL-MCNC: 8.6 MG/DL (ref 8.5–10.1)
CHLORIDE SERPL-SCNC: 111 MMOL/L (ref 94–109)
CO2 SERPL-SCNC: 25 MMOL/L (ref 20–32)
COLOR UR AUTO: YELLOW
CREAT SERPL-MCNC: 0.61 MG/DL (ref 0.52–1.04)
DIFFERENTIAL METHOD BLD: ABNORMAL
EOSINOPHIL # BLD AUTO: 0.9 10E9/L (ref 0–0.7)
EOSINOPHIL NFR BLD AUTO: 6.4 %
ERYTHROCYTE [DISTWIDTH] IN BLOOD BY AUTOMATED COUNT: 21.8 % (ref 10–15)
GFR SERPL CREATININE-BSD FRML MDRD: >90 ML/MIN/{1.73_M2}
GLUCOSE SERPL-MCNC: 98 MG/DL (ref 70–99)
GLUCOSE UR STRIP-MCNC: NEGATIVE MG/DL
HCG SERPL QL: NEGATIVE
HCT VFR BLD AUTO: 21.7 % (ref 35–47)
HGB BLD-MCNC: 7.6 G/DL (ref 11.7–15.7)
HGB UR QL STRIP: NEGATIVE
IMM GRANULOCYTES # BLD: 0.1 10E9/L (ref 0–0.4)
IMM GRANULOCYTES NFR BLD: 0.4 %
INR PPP: 1.26 (ref 0.86–1.14)
INTERPRETATION ECG - MUSE: NORMAL
KETONES UR STRIP-MCNC: NEGATIVE MG/DL
LACTATE BLD-SCNC: 1.1 MMOL/L (ref 0.7–2)
LEUKOCYTE ESTERASE UR QL STRIP: ABNORMAL
LYMPHOCYTES # BLD AUTO: 3.4 10E9/L (ref 0.8–5.3)
LYMPHOCYTES NFR BLD AUTO: 25.5 %
MCH RBC QN AUTO: 31.9 PG (ref 26.5–33)
MCHC RBC AUTO-ENTMCNC: 35 G/DL (ref 31.5–36.5)
MCV RBC AUTO: 91 FL (ref 78–100)
MONOCYTES # BLD AUTO: 1 10E9/L (ref 0–1.3)
MONOCYTES NFR BLD AUTO: 7.6 %
NEUTROPHILS # BLD AUTO: 7.9 10E9/L (ref 1.6–8.3)
NEUTROPHILS NFR BLD AUTO: 58.6 %
NITRATE UR QL: NEGATIVE
NRBC # BLD AUTO: 0.4 10*3/UL
NRBC BLD AUTO-RTO: 3 /100
NT-PROBNP SERPL-MCNC: 94 PG/ML (ref 0–450)
PH UR STRIP: 6 PH (ref 5–7)
PLATELET # BLD AUTO: 352 10E9/L (ref 150–450)
POTASSIUM SERPL-SCNC: 4.3 MMOL/L (ref 3.4–5.3)
PROT SERPL-MCNC: 7.6 G/DL (ref 6.8–8.8)
RBC # BLD AUTO: 2.38 10E12/L (ref 3.8–5.2)
RBC #/AREA URNS AUTO: 1 /HPF (ref 0–2)
RETICS # AUTO: 618.8 10E9/L (ref 25–95)
RETICS/RBC NFR AUTO: 26 % (ref 0.5–2)
SODIUM SERPL-SCNC: 138 MMOL/L (ref 133–144)
SOURCE: ABNORMAL
SP GR UR STRIP: 1.01 (ref 1–1.03)
SQUAMOUS #/AREA URNS AUTO: <1 /HPF (ref 0–1)
TRANS CELLS #/AREA URNS HPF: <1 /HPF (ref 0–1)
TROPONIN I SERPL-MCNC: <0.015 UG/L (ref 0–0.04)
UROBILINOGEN UR STRIP-MCNC: 4 MG/DL (ref 0–2)
WBC # BLD AUTO: 13.5 10E9/L (ref 4–11)
WBC #/AREA URNS AUTO: 1 /HPF (ref 0–5)

## 2021-02-01 PROCEDURE — 85610 PROTHROMBIN TIME: CPT | Performed by: EMERGENCY MEDICINE

## 2021-02-01 PROCEDURE — 84484 ASSAY OF TROPONIN QUANT: CPT | Performed by: EMERGENCY MEDICINE

## 2021-02-01 PROCEDURE — 81001 URINALYSIS AUTO W/SCOPE: CPT | Performed by: EMERGENCY MEDICINE

## 2021-02-01 PROCEDURE — 84703 CHORIONIC GONADOTROPIN ASSAY: CPT | Performed by: EMERGENCY MEDICINE

## 2021-02-01 PROCEDURE — 85025 COMPLETE CBC W/AUTO DIFF WBC: CPT | Performed by: EMERGENCY MEDICINE

## 2021-02-01 PROCEDURE — 96376 TX/PRO/DX INJ SAME DRUG ADON: CPT | Performed by: EMERGENCY MEDICINE

## 2021-02-01 PROCEDURE — 120N000011 HC R&B TRANSPLANT UMMC

## 2021-02-01 PROCEDURE — A9540 TC99M MAA: HCPCS | Performed by: EMERGENCY MEDICINE

## 2021-02-01 PROCEDURE — U0003 INFECTIOUS AGENT DETECTION BY NUCLEIC ACID (DNA OR RNA); SEVERE ACUTE RESPIRATORY SYNDROME CORONAVIRUS 2 (SARS-COV-2) (CORONAVIRUS DISEASE [COVID-19]), AMPLIFIED PROBE TECHNIQUE, MAKING USE OF HIGH THROUGHPUT TECHNOLOGIES AS DESCRIBED BY CMS-2020-01-R: HCPCS | Performed by: EMERGENCY MEDICINE

## 2021-02-01 PROCEDURE — 85730 THROMBOPLASTIN TIME PARTIAL: CPT | Performed by: EMERGENCY MEDICINE

## 2021-02-01 PROCEDURE — 96376 TX/PRO/DX INJ SAME DRUG ADON: CPT

## 2021-02-01 PROCEDURE — 78580 LUNG PERFUSION IMAGING: CPT

## 2021-02-01 PROCEDURE — 99285 EMERGENCY DEPT VISIT HI MDM: CPT | Mod: 25 | Performed by: EMERGENCY MEDICINE

## 2021-02-01 PROCEDURE — 80053 COMPREHEN METABOLIC PANEL: CPT | Performed by: EMERGENCY MEDICINE

## 2021-02-01 PROCEDURE — 96366 THER/PROPH/DIAG IV INF ADDON: CPT

## 2021-02-01 PROCEDURE — 93010 ELECTROCARDIOGRAM REPORT: CPT | Performed by: EMERGENCY MEDICINE

## 2021-02-01 PROCEDURE — 83605 ASSAY OF LACTIC ACID: CPT | Performed by: EMERGENCY MEDICINE

## 2021-02-01 PROCEDURE — 96365 THER/PROPH/DIAG IV INF INIT: CPT

## 2021-02-01 PROCEDURE — 99285 EMERGENCY DEPT VISIT HI MDM: CPT | Mod: 25

## 2021-02-01 PROCEDURE — 85045 AUTOMATED RETICULOCYTE COUNT: CPT | Performed by: EMERGENCY MEDICINE

## 2021-02-01 PROCEDURE — 83880 ASSAY OF NATRIURETIC PEPTIDE: CPT | Performed by: EMERGENCY MEDICINE

## 2021-02-01 PROCEDURE — U0005 INFEC AGEN DETEC AMPLI PROBE: HCPCS | Performed by: EMERGENCY MEDICINE

## 2021-02-01 PROCEDURE — 343N000001 HC RX 343: Performed by: EMERGENCY MEDICINE

## 2021-02-01 PROCEDURE — 250N000011 HC RX IP 250 OP 636: Performed by: EMERGENCY MEDICINE

## 2021-02-01 PROCEDURE — 78580 LUNG PERFUSION IMAGING: CPT | Mod: 26

## 2021-02-01 PROCEDURE — C9803 HOPD COVID-19 SPEC COLLECT: HCPCS

## 2021-02-01 RX ORDER — HEPARIN SODIUM 10000 [USP'U]/100ML
0-5000 INJECTION, SOLUTION INTRAVENOUS CONTINUOUS
Status: DISCONTINUED | OUTPATIENT
Start: 2021-02-01 | End: 2021-02-02

## 2021-02-01 RX ORDER — HEPARIN SODIUM (PORCINE) LOCK FLUSH IV SOLN 100 UNIT/ML 100 UNIT/ML
5 SOLUTION INTRAVENOUS
Status: DISCONTINUED | OUTPATIENT
Start: 2021-02-01 | End: 2021-02-01 | Stop reason: HOSPADM

## 2021-02-01 RX ADMIN — HEPARIN SODIUM 1300 UNITS/HR: 10000 INJECTION, SOLUTION INTRAVENOUS at 22:18

## 2021-02-01 RX ADMIN — KIT FOR THE PREPARATION OF TECHNETIUM TC 99M ALBUMIN AGGREGATED 7 MCI.: 2.5 INJECTION, POWDER, FOR SOLUTION INTRAVENOUS at 01:25

## 2021-02-01 RX ADMIN — MORPHINE SULFATE 2 MG: 4 INJECTION, SOLUTION INTRAMUSCULAR; INTRAVENOUS at 01:02

## 2021-02-01 RX ADMIN — HEPARIN 5 ML: 100 SYRINGE at 03:00

## 2021-02-01 ASSESSMENT — MIFFLIN-ST. JEOR: SCORE: 1484.83

## 2021-02-01 NOTE — PROGRESS NOTES
Writer placed call to Jennifer but had to leave message. Left detailed voicemail asking her to call and let us know how she is feeling today. Was in ER overnight and has new PE's. Advised is she has continued SOB, she should return to ER but in either case, to call us and let us know how she is feeling. Left number for triage so that she may connect with RN today when she calls back.

## 2021-02-01 NOTE — ED PROVIDER NOTES
ED Provider Note  [facility]      History     Chief Complaint   Patient presents with     Sickle Cell Pain Crisis     The history is provided by the patient.     Jennifer Cervantes is a 20 year old female with a medical history significant for sickle cell disease, right-sided cerebral infarction, asthma, depression and anxiety who presents to the Emergency Department for evaluation of chest pain.  Patient states that her pain starts in her left posterior shoulder and radiates into the left side of her chest.  She states that the pain started today while lying down.  She describes it as a sharp type pain.  Patient states that this pain feels different been any chest pain she has had in the past.  She denies any exacerbation of the pain with taking deep breaths, exertion or pushing on the area.  The patient states that she has had chest pain in the past related to her asthma, but she does not feel that she is having an asthma flare today.  Patient has tried using her inhaler at home, but this did not help with her chest pain.  The patient reports that she has not had an asthma flare in the last 1-2 months.  Patient notes that she was recently started on iron supplements, but this was causing her heartburn so she was taken off of this medication.  Patient reports that her pain today feels different than the heartburn she had while on the iron supplements.  Patient denies any associated shortness of breath, cough or fevers.  Patient also denies any leg swelling or pain.  Patient reports that she is on a daily aspirin 81 mg, but she denies being on any anticoagulation medications.  Patient reports that she is also having lower back pain which she states is typical of her sickle cell pain.  She states that this lower back pain has been controlled with her home pain medications and that she is not here for a sickle cell pain crisis, but is here for this new chest pain.    Past Medical History  Past  Medical History:   Diagnosis Date     Anemia      Anxiety      Bleeding disorder (H)      Blood clotting disorder (H)      Cerebral infarction (H) 2015     Cognitive developmental delay     low IQ. 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 (H)     right hand contractures     Iron overload due to repeated red blood cell transfusions      Migraines      Oppositional defiant behavior      Uncomplicated asthma      Past Surgical History:   Procedure Laterality Date     AS INSERT TUNNELED CV 2 CATH W/O PORT/PUMP       C BREAST REDUCTION (INCLUDES LIPO) TIER 3 Bilateral 04/23/2019     IR CVC NON TUNNEL PLACEMENT  4/7/2020     REPAIR TENDON ELBOW Right 10/2/2019    Procedure: Right Elbow Flexor Lengthening, Flexor Pronator Slide Of Wrist and Finger, Thumb Adductor Lengthening;  Surgeon: Anai Franco MD;  Location: [facility] OR     TONSILLECTOMY Bilateral 10/2/2019    Procedure: Bilateral Tonsillectomy;  Surgeon: Farhana Guy MD;  Location: [facility] OR          acetaminophen (TYLENOL) 325 MG tablet       albuterol (PROAIR HFA/PROVENTIL HFA/VENTOLIN HFA) 108 (90 Base) MCG/ACT inhaler       albuterol (PROVENTIL) (2.5 MG/3ML) 0.083% neb solution       aspirin (ASPIRIN) 81 MG EC tablet       budesonide-formoterol (SYMBICORT) 160-4.5 MCG/ACT Inhaler       celecoxib (CELEBREX) 100 MG capsule       diphenhydrAMINE (BENADRYL) 25 MG capsule       Hydroxyurea 1000 MG TABS       ibuprofen (ADVIL/MOTRIN) 200 MG tablet       JADENU 360 MG tablet       medroxyPROGESTERone (DEPO-PROVERA) 150 MG/ML IM injection       naloxone (NARCAN) 4 MG/0.1ML nasal spray       ondansetron (ZOFRAN) 8 MG tablet       oxyCODONE IR (ROXICODONE) 10 MG tablet       sertraline (ZOLOFT) 100 MG tablet      Allergies   Allergen Reactions     Fish-Derived Products Hives     Seafood Hives     Contrast Dye      Diagnostic  X-Ray Materials      Gadolinium      Family History  Family History   Problem Relation Age of Onset     Sickle Cell Trait Mother      Sickle Cell Trait Father      Social History   Social History     Tobacco Use     Smoking status: Never Smoker     Smokeless tobacco: Never Used   Substance Use Topics     Alcohol use: Not Currently     Alcohol/week: 0.0 standard drinks     Drug use: Never      Past medical history, past surgical history, medications, allergies, family history, and social history were reviewed with the patient. No additional pertinent items.       Review of Systems   Constitutional: Negative for appetite change and fever.   Respiratory: Negative for cough and shortness of breath.    Cardiovascular: Positive for chest pain. Negative for leg swelling.   Gastrointestinal: Negative for abdominal pain, nausea and vomiting.   Genitourinary: Negative for dysuria.   Musculoskeletal: Positive for back pain (lower).   Neurological: Negative for headaches.   All other systems reviewed and are negative.      Physical Exam   BP: 127/77  Pulse: 108  Temp: 97.3  F (36.3  C)  Resp: 16  SpO2: 94 %  Physical Exam  Vitals signs and nursing note reviewed.   Constitutional:       General: She is not in acute distress.     Appearance: Normal appearance. She is well-developed. She is not ill-appearing or diaphoretic.   HENT:      Head: Normocephalic and atraumatic.      Nose: Nose normal.   Eyes:      General: No scleral icterus.     Conjunctiva/sclera: Conjunctivae normal.   Neck:      Musculoskeletal: Normal range of motion and neck supple. No neck rigidity.   Cardiovascular:      Rate and Rhythm: Regular rhythm. Tachycardia present.   Pulmonary:      Effort: Pulmonary effort is normal. No respiratory distress.      Breath sounds: No stridor.   Chest:      Chest wall: No tenderness or crepitus.   Abdominal:      General: There is no distension.      Palpations: Abdomen is soft.      Tenderness: There is no abdominal  tenderness. There is no guarding or rebound.   Musculoskeletal: Normal range of motion.         General: No deformity or signs of injury.      Right shoulder: She exhibits pain. She exhibits normal range of motion, no tenderness, no bony tenderness, no swelling, no crepitus, no deformity, no spasm and normal strength.        Arms:    Skin:     General: Skin is warm and dry.      Coloration: Skin is not jaundiced or pale.      Findings: No bruising or rash.   Neurological:      General: No focal deficit present.      Mental Status: She is alert and oriented to person, place, and time.   Psychiatric:         Mood and Affect: Mood normal.         Behavior: Behavior normal.         Thought Content: Thought content normal.         ED Course      Procedures     9:00 PM  The patient was seen and examined by Jackie Dale MD in Room ED07.     Results for orders placed during the hospital encounter of 01/31/21   POC US ECHO LIMITED    Impression Limited Bedside Cardiac Ultrasound, performed and interpreted by me.   Indication: Chest Pain.  Parasternal long axis, parasternal short axis, apical 4 chamber and subcostal views were acquired.   Image quality was satisfactory.    Findings:    Global left ventricular function appears intact.  There is no evidence of free fluid within the pericardium.  RV appears enlarged.    IMPRESSION: Grossly normal left ventricular function and chamber size.  No pericardial effusion.     POC US CHEST B-SCAN    Impression Limited Bedside Lung Ultrasound, performed and interpreted by me.   Indication: Chest pain    With the patient positioned upright, bilateral anterior and lateral lung fields were examined for evidence of thoracic free fluid, pulmonary consolidation, and pulmonary edema.       Findings: All lung fields showed A-lines consisting with normal lung artifact. No B-lines. No free fluid.     IMPRESSION: Normal lung ultrasound without evidence of effusion, pneumonia, or pulmonary edema.                EKG Interpretation:      Interpreted by Jackie Dale MD  Time reviewed: 2123  Symptoms at time of EKG: chest pain   Rhythm: normal sinus   Rate: normal  Axis: normal  Ectopy: none  Conduction: normal  ST Segments/ T Waves: No ST-T wave changes  Q Waves: none  Comparison to prior: Unchanged    Clinical Impression: normal EKG                      Results for orders placed or performed during the hospital encounter of 01/31/21   POC US CHEST B-SCAN (PTX/Edema/PNA)     Status: None    Impression    Limited Bedside Lung Ultrasound, performed and interpreted by me.   Indication: Chest pain    With the patient positioned upright, bilateral anterior and lateral lung fields were examined for evidence of thoracic free fluid, pulmonary consolidation, and pulmonary edema.       Findings: All lung fields showed A-lines consisting with normal lung artifact. No B-lines. No free fluid.     IMPRESSION: Normal lung ultrasound without evidence of effusion, pneumonia, or pulmonary edema.     POC US ECHO LIMITED     Status: None    Impression    Limited Bedside Cardiac Ultrasound, performed and interpreted by me.   Indication: Chest Pain.  Parasternal long axis, parasternal short axis, apical 4 chamber and subcostal views were acquired.   Image quality was satisfactory.    Findings:    Global left ventricular function appears intact.  There is no evidence of free fluid within the pericardium.  RV appears enlarged.    IMPRESSION: Grossly normal left ventricular function and chamber size.  No pericardial effusion.     CBC with platelets differential     Status: Abnormal   Result Value Ref Range    WBC 13.2 (H) 4.0 - 11.0 10e9/L    RBC Count 2.47 (L) 3.8 - 5.2 10e12/L    Hemoglobin 8.0 (L) 11.7 - 15.7 g/dL    Hematocrit 23.2 (L) 35.0 - 47.0 %    MCV 94 78 - 100 fl    MCH 32.4 26.5 - 33.0 pg    MCHC 34.5 31.5 - 36.5 g/dL    RDW 21.4 (H) 10.0 - 15.0 %    Platelet Count 329 150 - 450 10e9/L    Diff Method Automated Method     %  Neutrophils 59.4 %    % Lymphocytes 23.7 %    % Monocytes 9.2 %    % Eosinophils 5.4 %    % Basophils 1.8 %    % Immature Granulocytes 0.5 %    Nucleated RBCs 3 (H) 0 /100    Absolute Neutrophil 7.8 1.6 - 8.3 10e9/L    Absolute Lymphocytes 3.1 0.8 - 5.3 10e9/L    Absolute Monocytes 1.2 0.0 - 1.3 10e9/L    Absolute Eosinophils 0.7 0.0 - 0.7 10e9/L    Absolute Basophils 0.2 0.0 - 0.2 10e9/L    Abs Immature Granulocytes 0.1 0 - 0.4 10e9/L    Absolute Nucleated RBC 0.3    Basic metabolic panel     Status: None   Result Value Ref Range    Sodium 136 133 - 144 mmol/L    Potassium 3.8 3.4 - 5.3 mmol/L    Chloride 109 94 - 109 mmol/L    Carbon Dioxide 24 20 - 32 mmol/L    Anion Gap 4 3 - 14 mmol/L    Glucose 84 70 - 99 mg/dL    Urea Nitrogen 11 7 - 30 mg/dL    Creatinine 0.57 0.52 - 1.04 mg/dL    GFR Estimate >90 >60 mL/min/[1.73_m2]    GFR Estimate If Black >90 >60 mL/min/[1.73_m2]    Calcium 8.6 8.5 - 10.1 mg/dL   Reticulocyte count     Status: Abnormal   Result Value Ref Range    % Retic 27.6 (H) 0.5 - 2.0 %    Absolute Retic 681.7 (H) 25 - 95 10e9/L   D dimer quantitative     Status: Abnormal   Result Value Ref Range    D Dimer 1.9 (H) 0.0 - 0.50 ug/ml FEU   Troponin I     Status: None   Result Value Ref Range    Troponin I ES <0.015 0.000 - 0.045 ug/L   EKG 12-lead, tracing only     Status: None (Preliminary result)   Result Value Ref Range    Interpretation ECG Click View Image link to view waveform and result      Medications   morphine (PF) injection 2 mg (2 mg Intravenous Given 1/31/21 2223)   lactated ringers infusion (has no administration in time range)   diphenhydrAMINE (BENADRYL) capsule 25 mg (has no administration in time range)   ondansetron (ZOFRAN) injection 4 mg (has no administration in time range)   ketorolac (TORADOL) injection 30 mg (30 mg Intravenous Given 1/31/21 2055)   lactated ringers BOLUS 1,000 mL (1,000 mLs Intravenous New Bag 1/31/21 2052)        Assessments & Plan (with Medical Decision  Making)   Jennifer Cervantes is a 20 year old female with a medical history significant for sickle cell disease, right-sided cerebral infarction, asthma, depression and anxiety who presents to the Emergency Department for evaluation of chest pain.    Ddx: PE, acute coronary syndrome, acute chest syndrome, sickle cell pain crisis. Pleural infarct, pleural effusion, pericarditis, MSK pain, pneumothorax    Patient given IV Toradol, morphine, Benadryl, Zofran, lactated Ringer's, per her pain management plan.  Of note, patient reports that she is not here for her typical sickle cell pain but wanted to be evaluated for chest pain radiating to her right shoulder and upper back that is atypical for her usual sickle cell presentations.  Labs notable for essentially stable hemoglobin and white blood cell count.  Normal BMP.  Negative troponin.  EKG shows normal sinus rhythm without acute ischemic changes.  Point-of-care ultrasound shows bilateral lung sliding without B-lines.  No pleural effusions.  Normal left ventricular ejection fraction.  No pericardial effusion.  The right ventricle does appear somewhat enlarged.  No recent echocardiograms for comparison.  Patient's D-dimer is markedly elevated.  Patient is not anticoagulated.  She has a severe contrast allergy so a VQ scan is ordered for rule out PE.    Signed out to the oncoming provider at shift change.  Disposition pending VQ scan.  I suspect patient will be agreeable to discharge if pulmonary embolism is ruled out.  She states that her sickle cell pain is manageable with home medications.      I have reviewed the nursing notes. I have reviewed the findings, diagnosis, plan and need for follow up with the patient.    New Prescriptions    No medications on file       Final diagnoses:   Chest pain, unspecified type       --  I, Isaac Walton, am serving as a trained medical scribe to document services personally performed by Jackie Dale MD, based on the provider's  statements to me.     I, Jackie Dale MD, was physically present and have reviewed and verified the accuracy of this note documented by Isaac Walton.    Jackie Dale MD  [facility] EMERGENCY DEPARTMENT  1/31/2021     Jackie Dale MD  01/31/21 1112

## 2021-02-01 NOTE — DISCHARGE INSTRUCTIONS
Please make an appointment to follow up with Your Primary Care Provider and Hematology Oncology Clinic (phone: [phone]) as soon as possible.    Return to the Emergency Department if you develop worsening chest pain, shortness of breath, fever, coughing up blood, or fainting.

## 2021-02-01 NOTE — ED TRIAGE NOTES
Pt arrived by private car with c/o sickle cell pain crisis. Pain flared up early today. Pt also states that she has a sharp pain in her, which is different from her other flare ups.

## 2021-02-01 NOTE — TELEPHONE ENCOUNTER
Per Dr. Duncan: recommend ED and that Dr. Alvarado may want to admit. He will follow-up with triage when he finds out more from in-patient team.

## 2021-02-01 NOTE — ED PROVIDER NOTES
Patient did call back to the emergency department around noon today.  She was informed of her abnormal VQ scan concerning for pulmonary embolism.  She was asked to return to the emergency department immediately to initiate anticoagulation.  Patient denied any ongoing symptoms.  Her case was also discussed with hematology.  If patient is stable today as she was yesterday, hematology recommends discharge to home with initiation of Xarelto or Eliquis Request for outpatient follow-up already sent to patient's primary hematologist.  Hold aspirin if patient taking.     Francesco Green MD  02/01/21 6042       Francesco Green MD  02/01/21 9415

## 2021-02-01 NOTE — TELEPHONE ENCOUNTER
Pt called triage back in response to message left by RNCC to follow-up on ED visit yesterday. She was diagnosed with a PE, and pt stated she just received a call from the ED to return so she can be started on anticoagulation. She wondered if she can have this done at the clinic instead of coming back to the ED. Denied worsening chest pain or sob though still having pains same as yesterday. Stated she does not feel she needs oxygen. Paged Dr. Duncan.

## 2021-02-01 NOTE — ED PROVIDER NOTES
Patient signed out to me by prior attending Dr. Dale.  Briefly 20-year-old female with history of sickle cell disease presenting with acute pain crisis.  There was some concern about possible pulmonary embolism due to chest pain.  A VQ scan was required to evaluate this and has now been performed.  Plan is to discharge if this can be of low probability for pulmonary embolism as it is.  Patient's pain has been under satisfactory control throughout her ED stay.  She will follow-up with her hematologist and return for any worsening signs or symptoms.    /74   Pulse 101   Temp 97.3  F (36.3  C) (Oral)   Resp 18   SpO2 96%          Deo Schmid MD  02/01/21 023

## 2021-02-01 NOTE — ED PROVIDER NOTES
Call from radiology this morning.  Over read of VQ scan now being read as positive for pulmonary embolism.  Patient called and message left to call the emergency department.     Francesco Green MD  02/01/21 0932

## 2021-02-01 NOTE — TELEPHONE ENCOUNTER
Per Dr. Duncan: discussed with pt about plan to admit at [facility], she will come in. Sched follow-up visit on 2/5 at 2:30 pm, slot held and scheduling messaged.

## 2021-02-02 ENCOUNTER — APPOINTMENT (OUTPATIENT)
Dept: GENERAL RADIOLOGY | Facility: CLINIC | Age: 21
DRG: 176 | End: 2021-02-02
Attending: STUDENT IN AN ORGANIZED HEALTH CARE EDUCATION/TRAINING PROGRAM
Payer: COMMERCIAL

## 2021-02-02 ENCOUNTER — APPOINTMENT (OUTPATIENT)
Dept: CARDIOLOGY | Facility: CLINIC | Age: 21
DRG: 176 | End: 2021-02-02
Attending: STUDENT IN AN ORGANIZED HEALTH CARE EDUCATION/TRAINING PROGRAM
Payer: COMMERCIAL

## 2021-02-02 LAB
ANION GAP SERPL CALCULATED.3IONS-SCNC: 2 MMOL/L (ref 3–14)
BUN SERPL-MCNC: 12 MG/DL (ref 7–30)
CALCIUM SERPL-MCNC: 9 MG/DL (ref 8.5–10.1)
CHLORIDE SERPL-SCNC: 112 MMOL/L (ref 94–109)
CO2 SERPL-SCNC: 26 MMOL/L (ref 20–32)
CREAT SERPL-MCNC: 0.62 MG/DL (ref 0.52–1.04)
ERYTHROCYTE [DISTWIDTH] IN BLOOD BY AUTOMATED COUNT: 21.6 % (ref 10–15)
GFR SERPL CREATININE-BSD FRML MDRD: >90 ML/MIN/{1.73_M2}
GLUCOSE SERPL-MCNC: 93 MG/DL (ref 70–99)
HCT VFR BLD AUTO: 21.4 % (ref 35–47)
HGB BLD-MCNC: 7.3 G/DL (ref 11.7–15.7)
INTERPRETATION ECG - MUSE: NORMAL
LABORATORY COMMENT REPORT: NORMAL
LACTATE BLD-SCNC: 0.8 MMOL/L (ref 0.7–2)
MAGNESIUM SERPL-MCNC: 2.1 MG/DL (ref 1.6–2.3)
MCH RBC QN AUTO: 32.2 PG (ref 26.5–33)
MCHC RBC AUTO-ENTMCNC: 34.1 G/DL (ref 31.5–36.5)
MCV RBC AUTO: 94 FL (ref 78–100)
PLATELET # BLD AUTO: 295 10E9/L (ref 150–450)
POTASSIUM SERPL-SCNC: 4.2 MMOL/L (ref 3.4–5.3)
RBC # BLD AUTO: 2.27 10E12/L (ref 3.8–5.2)
SARS-COV-2 RNA RESP QL NAA+PROBE: NEGATIVE
SODIUM SERPL-SCNC: 140 MMOL/L (ref 133–144)
SPECIMEN SOURCE: NORMAL
UFH PPP CHRO-ACNC: 0.68 IU/ML
UFH PPP CHRO-ACNC: >1.1 IU/ML
WBC # BLD AUTO: 15.6 10E9/L (ref 4–11)

## 2021-02-02 PROCEDURE — 258N000003 HC RX IP 258 OP 636: Performed by: STUDENT IN AN ORGANIZED HEALTH CARE EDUCATION/TRAINING PROGRAM

## 2021-02-02 PROCEDURE — 36415 COLL VENOUS BLD VENIPUNCTURE: CPT | Performed by: EMERGENCY MEDICINE

## 2021-02-02 PROCEDURE — 36415 COLL VENOUS BLD VENIPUNCTURE: CPT | Performed by: STUDENT IN AN ORGANIZED HEALTH CARE EDUCATION/TRAINING PROGRAM

## 2021-02-02 PROCEDURE — 85027 COMPLETE CBC AUTOMATED: CPT | Performed by: EMERGENCY MEDICINE

## 2021-02-02 PROCEDURE — 93306 TTE W/DOPPLER COMPLETE: CPT

## 2021-02-02 PROCEDURE — 36592 COLLECT BLOOD FROM PICC: CPT | Performed by: STUDENT IN AN ORGANIZED HEALTH CARE EDUCATION/TRAINING PROGRAM

## 2021-02-02 PROCEDURE — 93306 TTE W/DOPPLER COMPLETE: CPT | Mod: 26 | Performed by: STUDENT IN AN ORGANIZED HEALTH CARE EDUCATION/TRAINING PROGRAM

## 2021-02-02 PROCEDURE — 85520 HEPARIN ASSAY: CPT | Performed by: EMERGENCY MEDICINE

## 2021-02-02 PROCEDURE — 94640 AIRWAY INHALATION TREATMENT: CPT

## 2021-02-02 PROCEDURE — 120N000011 HC R&B TRANSPLANT UMMC

## 2021-02-02 PROCEDURE — 80048 BASIC METABOLIC PNL TOTAL CA: CPT | Performed by: EMERGENCY MEDICINE

## 2021-02-02 PROCEDURE — 71045 X-RAY EXAM CHEST 1 VIEW: CPT | Mod: 26 | Performed by: RADIOLOGY

## 2021-02-02 PROCEDURE — 83605 ASSAY OF LACTIC ACID: CPT | Performed by: STUDENT IN AN ORGANIZED HEALTH CARE EDUCATION/TRAINING PROGRAM

## 2021-02-02 PROCEDURE — 99232 SBSQ HOSP IP/OBS MODERATE 35: CPT | Performed by: STUDENT IN AN ORGANIZED HEALTH CARE EDUCATION/TRAINING PROGRAM

## 2021-02-02 PROCEDURE — 250N000011 HC RX IP 250 OP 636: Performed by: EMERGENCY MEDICINE

## 2021-02-02 PROCEDURE — 83735 ASSAY OF MAGNESIUM: CPT | Performed by: EMERGENCY MEDICINE

## 2021-02-02 PROCEDURE — 250N000009 HC RX 250: Performed by: STUDENT IN AN ORGANIZED HEALTH CARE EDUCATION/TRAINING PROGRAM

## 2021-02-02 PROCEDURE — 250N000013 HC RX MED GY IP 250 OP 250 PS 637: Performed by: STUDENT IN AN ORGANIZED HEALTH CARE EDUCATION/TRAINING PROGRAM

## 2021-02-02 PROCEDURE — 999N000157 HC STATISTIC RCP TIME EA 10 MIN

## 2021-02-02 PROCEDURE — 85520 HEPARIN ASSAY: CPT | Performed by: STUDENT IN AN ORGANIZED HEALTH CARE EDUCATION/TRAINING PROGRAM

## 2021-02-02 PROCEDURE — 71045 X-RAY EXAM CHEST 1 VIEW: CPT

## 2021-02-02 PROCEDURE — 99222 1ST HOSP IP/OBS MODERATE 55: CPT | Mod: GC | Performed by: INTERNAL MEDICINE

## 2021-02-02 RX ORDER — LIDOCAINE 40 MG/G
CREAM TOPICAL
Status: DISCONTINUED | OUTPATIENT
Start: 2021-02-02 | End: 2021-02-03 | Stop reason: HOSPADM

## 2021-02-02 RX ORDER — OXYCODONE HYDROCHLORIDE 5 MG/1
10 TABLET ORAL EVERY 6 HOURS PRN
Status: DISCONTINUED | OUTPATIENT
Start: 2021-02-02 | End: 2021-02-03 | Stop reason: HOSPADM

## 2021-02-02 RX ORDER — DEFERASIROX 360 MG/1
1440 TABLET, FILM COATED ORAL DAILY
Status: DISCONTINUED | OUTPATIENT
Start: 2021-02-02 | End: 2021-02-03 | Stop reason: HOSPADM

## 2021-02-02 RX ORDER — SERTRALINE HYDROCHLORIDE 100 MG/1
200 TABLET, FILM COATED ORAL DAILY
Status: DISCONTINUED | OUTPATIENT
Start: 2021-02-02 | End: 2021-02-03 | Stop reason: HOSPADM

## 2021-02-02 RX ORDER — ALBUTEROL SULFATE 0.83 MG/ML
2.5 SOLUTION RESPIRATORY (INHALATION) EVERY 6 HOURS PRN
Status: DISCONTINUED | OUTPATIENT
Start: 2021-02-02 | End: 2021-02-03 | Stop reason: HOSPADM

## 2021-02-02 RX ORDER — CELECOXIB 100 MG/1
100 CAPSULE ORAL 2 TIMES DAILY
Status: DISCONTINUED | OUTPATIENT
Start: 2021-02-02 | End: 2021-02-03 | Stop reason: HOSPADM

## 2021-02-02 RX ORDER — ACETAMINOPHEN 325 MG/1
650 TABLET ORAL EVERY 4 HOURS PRN
Status: DISCONTINUED | OUTPATIENT
Start: 2021-02-02 | End: 2021-02-03 | Stop reason: HOSPADM

## 2021-02-02 RX ORDER — AMOXICILLIN 250 MG
2 CAPSULE ORAL 2 TIMES DAILY PRN
Status: DISCONTINUED | OUTPATIENT
Start: 2021-02-02 | End: 2021-02-03 | Stop reason: HOSPADM

## 2021-02-02 RX ORDER — ONDANSETRON 2 MG/ML
4 INJECTION INTRAMUSCULAR; INTRAVENOUS EVERY 6 HOURS PRN
Status: DISCONTINUED | OUTPATIENT
Start: 2021-02-02 | End: 2021-02-03 | Stop reason: HOSPADM

## 2021-02-02 RX ORDER — SODIUM CHLORIDE 9 MG/ML
INJECTION, SOLUTION INTRAVENOUS CONTINUOUS
Status: DISCONTINUED | OUTPATIENT
Start: 2021-02-02 | End: 2021-02-02

## 2021-02-02 RX ORDER — ONDANSETRON 4 MG/1
4 TABLET, ORALLY DISINTEGRATING ORAL EVERY 6 HOURS PRN
Status: DISCONTINUED | OUTPATIENT
Start: 2021-02-02 | End: 2021-02-03 | Stop reason: HOSPADM

## 2021-02-02 RX ORDER — ALBUTEROL SULFATE 90 UG/1
2 AEROSOL, METERED RESPIRATORY (INHALATION) EVERY 6 HOURS PRN
Status: DISCONTINUED | OUTPATIENT
Start: 2021-02-02 | End: 2021-02-03 | Stop reason: HOSPADM

## 2021-02-02 RX ORDER — POLYETHYLENE GLYCOL 3350 17 G/17G
17 POWDER, FOR SOLUTION ORAL DAILY PRN
Status: DISCONTINUED | OUTPATIENT
Start: 2021-02-02 | End: 2021-02-03 | Stop reason: HOSPADM

## 2021-02-02 RX ORDER — SODIUM CHLORIDE, SODIUM LACTATE, POTASSIUM CHLORIDE, CALCIUM CHLORIDE 600; 310; 30; 20 MG/100ML; MG/100ML; MG/100ML; MG/100ML
INJECTION, SOLUTION INTRAVENOUS CONTINUOUS
Status: DISCONTINUED | OUTPATIENT
Start: 2021-02-02 | End: 2021-02-02

## 2021-02-02 RX ORDER — AMOXICILLIN 250 MG
1 CAPSULE ORAL 2 TIMES DAILY PRN
Status: DISCONTINUED | OUTPATIENT
Start: 2021-02-02 | End: 2021-02-03 | Stop reason: HOSPADM

## 2021-02-02 RX ORDER — HEPARIN SODIUM 10000 [USP'U]/100ML
0-5000 INJECTION, SOLUTION INTRAVENOUS CONTINUOUS
Status: DISCONTINUED | OUTPATIENT
Start: 2021-02-02 | End: 2021-02-02

## 2021-02-02 RX ADMIN — ALBUTEROL SULFATE 2.5 MG: 2.5 SOLUTION RESPIRATORY (INHALATION) at 08:46

## 2021-02-02 RX ADMIN — RIVAROXABAN 15 MG: 15 TABLET, FILM COATED ORAL at 18:06

## 2021-02-02 RX ADMIN — FLUTICASONE FUROATE AND VILANTEROL TRIFENATATE 1 PUFF: 200; 25 POWDER RESPIRATORY (INHALATION) at 08:41

## 2021-02-02 RX ADMIN — CELECOXIB 100 MG: 100 CAPSULE ORAL at 08:40

## 2021-02-02 RX ADMIN — SODIUM CHLORIDE: 9 INJECTION, SOLUTION INTRAVENOUS at 04:46

## 2021-02-02 RX ADMIN — HEPARIN SODIUM 1000 UNITS/HR: 10000 INJECTION, SOLUTION INTRAVENOUS at 12:28

## 2021-02-02 RX ADMIN — CELECOXIB 100 MG: 100 CAPSULE ORAL at 19:41

## 2021-02-02 RX ADMIN — SERTRALINE HYDROCHLORIDE 200 MG: 100 TABLET ORAL at 08:44

## 2021-02-02 ASSESSMENT — ACTIVITIES OF DAILY LIVING (ADL)
ADLS_ACUITY_SCORE: 13

## 2021-02-02 ASSESSMENT — MIFFLIN-ST. JEOR: SCORE: 1480.29

## 2021-02-02 NOTE — PHARMACY-ADMISSION MEDICATION HISTORY
Admission Medication History Completed by Pharmacy    See [facility] Admission Navigator for allergy information, preferred outpatient pharmacy, prior to admission medications and immunization status.     Medication History Sources:     Patient, SureScripts, EMR    Changes made to PTA medication list (reason):    Added: None    Deleted: None    Changed: None    Additional Information:    Patient seemed to be a good historian.    The patient reports that aspirin was held on 01/31. Dr. Geren has a note from that day that supports this.     The patient reports taking two of the 1000 mg hydroxyurea tablets, however the dispense history shows 500 mg capsules being dispensed.     Prior to Admission medications    Medication Sig Last Dose Taking? Auth Provider   acetaminophen (TYLENOL) 325 MG tablet Take 2 tablets (650 mg) by mouth every 6 hours as needed for mild pain  Patient taking differently: Take 325-650 mg by mouth every 6 hours as needed for mild pain  2/1/2021 at Unknown time Yes Eric Duncan MD   albuterol (PROAIR HFA/PROVENTIL HFA/VENTOLIN HFA) 108 (90 Base) MCG/ACT inhaler Inhale 2 puffs into the lungs every 6 hours as needed for shortness of breath / dyspnea or wheezing Past Month at Unknown time Yes Andrei Machado PA   albuterol (PROVENTIL) (2.5 MG/3ML) 0.083% neb solution Take 1 vial (2.5 mg) by nebulization every 6 hours as needed for shortness of breath / dyspnea or wheezing Past Month at Unknown time Yes Andrei Machado PA   budesonide-formoterol (SYMBICORT) 160-4.5 MCG/ACT Inhaler Inhale 2 puffs into the lungs 2 times daily 1/31/2021 Yes Andrei Machado PA   celecoxib (CELEBREX) 100 MG capsule Take 1 capsule (100 mg) by mouth 2 times daily 1/31/2021 Yes Amalia Danielle PA-C   diphenhydrAMINE (BENADRYL) 25 MG capsule Take 1-2 capsules (25-50 mg) by mouth nightly as needed for sleep Past Month at pm Yes Andrei Machado PA   Hydroxyurea 1000 MG TABS Take 2,000 mg by mouth  daily  Patient taking differently: Take 2,000 mg by mouth every evening  1/31/2021 Yes Amalia Danielle PA-C   ibuprofen (ADVIL/MOTRIN) 200 MG tablet Take 3 tablets (600 mg) by mouth every 6 hours as needed for moderate pain  Yes Eric Duncan MD   JADENU 360 MG tablet Take 4 tablets (1,440 mg) by mouth daily  Patient taking differently: Take 1,440 mg by mouth every evening  1/31/2021 Yes Eric Duncan MD   medroxyPROGESTERone (DEPO-PROVERA) 150 MG/ML IM injection Inject 150 mg into the muscle Past Month at Unknown time Yes Reported, Patient   oxyCODONE IR (ROXICODONE) 10 MG tablet Take 1 tablet (10mg) by mouth every 6 hours for pain. If, after 2 doses it is not working, try a dose at 4 hours and call clinic. 1/31/2021 Yes Eric Duncan MD   sertraline (ZOLOFT) 100 MG tablet Take 2 tablets (200 mg) by mouth daily 1/31/2021 Yes Andrei Machado PA   aspirin (ASPIRIN) 81 MG EC tablet Take 1 tablet (81 mg) by mouth daily 1/31/2021  Andrei Machado PA   naloxone (NARCAN) 4 MG/0.1ML nasal spray Spray 1 spray (4 mg) into one nostril alternating nostrils once as needed for opioid reversal every 2-3 minutes until assistance arrives Unknown at nevery has used  Eric Duncan MD   ondansetron (ZOFRAN) 8 MG tablet  More than a month at Unknown time  Reported, Patient       Date completed: 02/02/21    Medication history completed by: Vito Bernstein - PharmD Student

## 2021-02-02 NOTE — ED TRIAGE NOTES
Pt arrives to triage with abnormal VQ results. Pt had a scan last evening and was discharged earlier this morning. Pt was called by ER doctor because there was blood clots found in her lungs. Pt reports intermittent chest pain. Pt currently takes aspirin daily, was told by doctor today to stop. A&O vitals stable.

## 2021-02-02 NOTE — CONSULTS
Hematology Consult Note    Jennifer Cervantes MRN# [id number]   Age: 20 year old YOB: 2000     Date of Admission: 2/1/2021    Reason for consult: Sickle cell patient with new PE on heparin gtt, assistance with management oral AC choice and workup of PE           History of Present Illness:   Jennifer Cervantes is a 20 year old female with history of sickle cell anemia, prior CVA, asthma and depression admitted with acute PE noted on V/Q scan. She presented to the ED on 1/31 with chest pain that started in the left posterior shoulder with radiation to the left side of the chest which felt different than her pain in the past associated with asthma or heartburn. She also reported low back pain which was consistent with sickle cell related pain. D-dimer was elevated 1.9 and she underwent V/Q scan (due to contrast allergy) which was initially read as normal. Final report returned positive for pulmonary embolism (3 moderated perfusion defects) on 1/31. Case was discussed with the hematology team and plan was to start out-patient Eliquis or Xarelto (per insurance approval) and hold aspirin while on DOAC. Case was then discussed with primary hematologist, Dr. Duncan who felt that patient should initiate anticoagulation in-patient. She was admitted and started on heparin drip. Echo did not show right heart strain. Today, she reports improvement in chest pain. She does have baseline low back pain which is her chronic sickle cell pain. She denies shortness of breath. Denies headache, vision changes, n/v/d, abdominal pain, constipation, urinary changes, skin rashes, bleeding or bruising symptoms, fever or chills.          Review of Systems:     10-point review of systems was otherwise negative except as noted in HPI.          Past Medical History:   Past medical history was reviewed.   Past Medical History:   Diagnosis Date     Anemia      Anxiety      Bleeding disorder (H)      Blood clotting disorder (H)      Cerebral  infarction (H) 2015     Cognitive developmental delay     low IQ. 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 (H)     right hand contractures     Iron overload due to repeated red blood cell transfusions      Migraines      Oppositional defiant behavior      Uncomplicated asthma              Past Surgical History:   Past surgical history was reviewed.   Past Surgical History:   Procedure Laterality Date     AS INSERT TUNNELED CV 2 CATH W/O PORT/PUMP       C BREAST REDUCTION (INCLUDES LIPO) TIER 3 Bilateral 04/23/2019     CHOLECYSTECTOMY       IR CVC NON TUNNEL PLACEMENT  04/07/2020     REPAIR TENDON ELBOW Right 10/02/2019    Procedure: Right Elbow Flexor Lengthening, Flexor Pronator Slide Of Wrist and Finger, Thumb Adductor Lengthening;  Surgeon: Anai Franco MD;  Location: [facility] OR     TONSILLECTOMY Bilateral 10/02/2019    Procedure: Bilateral Tonsillectomy;  Surgeon: Farhana Guy MD;  Location: [facility] OR             Social History:   Social history was reviewed.   Social History     Tobacco Use     Smoking status: Never Smoker     Smokeless tobacco: Never Used   Substance Use Topics     Alcohol use: Not Currently     Alcohol/week: 0.0 standard drinks             Family History:   Family history was reviewed.  Family History   Problem Relation Age of Onset     Sickle Cell Trait Mother      Hypertension Mother      Asthma Mother      Sickle Cell Trait Father              Allergies:     Allergies   Allergen Reactions     Fish-Derived Products Hives     Seafood Hives     Contrast Dye      Diagnostic X-Ray Materials      Gadolinium              Medications:   Medications Reviewed.   Current Facility-Administered Medications   Medication     acetaminophen (TYLENOL) tablet 650 mg     albuterol (PROAIR HFA/PROVENTIL HFA/VENTOLIN HFA) 108 (90 Base) MCG/ACT inhaler 2 puff      "albuterol (PROVENTIL) neb solution 2.5 mg     celecoxib (celeBREX) capsule 100 mg     [Held by provider] deferasirox (JADENU) tablet 1,440 mg     fluticasone-vilanterol (BREO ELLIPTA) 200-25 MCG/INH inhaler 1 puff     heparin 25,000 units in 0.45% NaCl 250 mL ANTICOAGULANT  infusion     Hydroxyurea TABS 2,000 mg     lidocaine (LMX4) cream     lidocaine 1 % 0.1-1 mL     melatonin tablet 1 mg     ondansetron (ZOFRAN-ODT) ODT tab 4 mg    Or     ondansetron (ZOFRAN) injection 4 mg     oxyCODONE (ROXICODONE) tablet 10 mg     Patient is already receiving anticoagulation with heparin, enoxaparin (LOVENOX), warfarin (COUMADIN)  or other anticoagulant medication     polyethylene glycol (MIRALAX) Packet 17 g     senna-docusate (SENOKOT-S/PERICOLACE) 8.6-50 MG per tablet 1 tablet    Or     senna-docusate (SENOKOT-S/PERICOLACE) 8.6-50 MG per tablet 2 tablet     sertraline (ZOLOFT) tablet 200 mg     sodium chloride (PF) 0.9% PF flush 3 mL     sodium chloride (PF) 0.9% PF flush 3 mL     sodium chloride 0.9% infusion             Physical Exam:   Vitals were reviewed.  Blood pressure 128/74, pulse 99, temperature 98.3  F (36.8  C), resp. rate 16, height 1.626 m (5' 4\"), weight 73 kg (161 lb), SpO2 93 %, not currently breastfeeding.    Constitutional: awake, laying in bed, appears comfortable, in NAD  HEENT: MMM, EOM intact, sclerae clear and anicteric  CV: RRR, no murmurs appreciated  Resp: Clear to auscultation bilaterally, breathing comfortably on room air, no wheezes, rhonchi  Abd: Soft, non-tender  MSK:  Warm, FROM, no joint swelling  Skin: no rash or lesions on limited exam  Neuro: CN2-12 grossly intact, RUE chronic contracture  Psych: Mood and affect WNL           Data:   I/O last 3 completed shifts:  In: 166.25 [I.V.:166.25]  Out: -     ROUTINE LABS (Last four results)  CMP  Recent Labs   Lab 02/02/21  0348 02/01/21 2200 01/31/21 2033 01/27/21  0825    138 136 140   POTASSIUM 4.2 4.3 3.8 3.7   CHLORIDE 112* 111* 109 " 113*   CO2 26 25 24 22   ANIONGAP 2* 2* 4 6   GLC 93 98 84 96   BUN 12 10 11 9   CR 0.62 0.61 0.57 0.54   GFRESTIMATED >90 >90 >90 >90   GFRESTBLACK >90 >90 >90 >90   MICAH 9.0 8.6 8.6 8.6   MAG 2.1  --   --   --    PROTTOTAL  --  7.6  --  7.8   ALBUMIN  --  3.9  --  4.1   BILITOTAL  --  2.9*  --  3.8*   ALKPHOS  --  71  --  74   AST  --  84*  --  93*   ALT  --  74*  --  84*     CBC  Recent Labs   Lab 02/02/21  0348 02/01/21 2200 01/31/21 2033 01/27/21  0825   WBC 15.6* 13.5* 13.2* 12.2*   RBC 2.27* 2.38* 2.47* 2.49*   HGB 7.3* 7.6* 8.0* 8.1*   HCT 21.4* 21.7* 23.2* 23.2*   MCV 94 91 94 93   MCH 32.2 31.9 32.4 32.5   MCHC 34.1 35.0 34.5 34.9   RDW 21.6* 21.8* 21.4* 21.0*    352 329 347     INR  Recent Labs   Lab 02/01/21 2200   INR 1.26*     Arterial Blood GasNo lab results found in last 7 days.    IMAGING    Nm Lung Scan Perfusion Particulate  Result Date: 2/1/2021  Impression: At least 3 moderate subsegmental perfusion defects (right greater than left), these findings are consistent with pulmonary emboli.     Echocardiogram Complete  Result Date: 2/2/2021  Interpretation Summary Left ventricular function, chamber size, wall motion, and wall thickness are normal.The EF is 55-60%. Right ventricular function, chamber size, wall motion, and thickness are normal. No pericardial effusion is present.         Assessment:   Jennifer Cervantes is a 20 year old female with history of sickle cell anemia, prior CVA, asthma and depression admitted with acute PE noted on V/Q scan.     - Start Xarelto 15 mg BID for 21 days and then 20 mg daily  - OK to discharge today from hematology perspective  - We will set up follow-up with Dr. Duncan  - [facility] per primary    Thank you for involving us in the care of the patient. Recommendations communicated with the primary team.     Patient was seen and plan discussed with attending physician, .     Tayolr Mei  Hematology, Oncology & Transplantation fellow  Pager:  [phone]  02/02/2021

## 2021-02-02 NOTE — ED PROVIDER NOTES
[facility] EMERGENCY DEPARTMENT ([facility])  February 1, 2021  History     Chief Complaint   Patient presents with     Abnormal Labs     The history is provided by the patient and medical records.     Jennifer Cervantes is a 20 year old female with a medical history significant for sickle cell disease who presents to the Emergency Department for evaluation after a VQ scan done yesterday showed concern for PE.  Review of patient's chart, patient was seen in the Emergency Department last night by Dr. Dale for complaints of sharp left-sided chest pain.  She had labs done including a D-dimer which returned at 1.9.  She subsequently had a VQ scan performed which was initially read as negative for any PEs; however, this was over read this morning showing 3 moderate subsegmental perfusion defects consistent with PE.  Patient was called earlier today and instructed to return the Emergency Department for initiation of anticoagulation and admission.    Patient today reports that she is still having some mild chest pain.  She reports that this pain starts in her left, upper back and radiates to the left side of her chest.  She denies any shortness of breath or exacerbation of the pain with taking deep breaths.  Patient also denies any recent leg pain or swelling.  Patient denies any recent fevers, chills, cough, abdominal pain, nausea, vomiting, lightheadedness, dizziness or numbness, tingling or weakness.  Patient denies any known exposure to COVID-19.  She reports that she has not been tested for COVID-19 in a long time.  Patient does note that she has her normal, chronic sickle cell pain in her lower back, but she denies any changes in this pain.  Patient denies any recent falls or head traumas.  Of note, patient reports that she did hold her aspirin 81 mg today as instructed by the hematologist.  Patient denies any history of DVT/PE.    NM Lung Scan Perfusion Particulate (1/31/2021)  Impression:  At least 3  moderate subsegmental perfusion defects (right greater than  left), these findings are consistent with pulmonary emboli.     PAST MEDICAL HISTORY:   Past Medical History:   Diagnosis Date     Anemia      Anxiety      Bleeding disorder (H)      Blood clotting disorder (H)      Cerebral infarction (H) 2015     Cognitive developmental delay     low IQ. 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 (H)     right hand contractures     Iron overload due to repeated red blood cell transfusions      Migraines      Oppositional defiant behavior      Uncomplicated asthma        PAST SURGICAL HISTORY:   Past Surgical History:   Procedure Laterality Date     AS INSERT TUNNELED CV 2 CATH W/O PORT/PUMP       C BREAST REDUCTION (INCLUDES LIPO) TIER 3 Bilateral 04/23/2019     IR CVC NON TUNNEL PLACEMENT  4/7/2020     REPAIR TENDON ELBOW Right 10/2/2019    Procedure: Right Elbow Flexor Lengthening, Flexor Pronator Slide Of Wrist and Finger, Thumb Adductor Lengthening;  Surgeon: Anai Franco MD;  Location[facility] OR     TONSILLECTOMY Bilateral 10/2/2019    Procedure: Bilateral Tonsillectomy;  Surgeon: Farhana Guy MD;  Location: [facility] OR       Past medical history, past surgical history, medications, and allergies were reviewed with the patient. Additional pertinent items: None    FAMILY HISTORY:   Family History   Problem Relation Age of Onset     Sickle Cell Trait Mother      Sickle Cell Trait Father        SOCIAL HISTORY:   Social History     Tobacco Use     Smoking status: Never Smoker     Smokeless tobacco: Never Used   Substance Use Topics     Alcohol use: Not Currently     Alcohol/week: 0.0 standard drinks     Social history was reviewed with the patient. Additional pertinent items: None      Patient's Medications   New Prescriptions    No medications on file   Previous Medications     ACETAMINOPHEN (TYLENOL) 325 MG TABLET    Take 2 tablets (650 mg) by mouth every 6 hours as needed for mild pain    ALBUTEROL (PROAIR HFA/PROVENTIL HFA/VENTOLIN HFA) 108 (90 BASE) MCG/ACT INHALER    Inhale 2 puffs into the lungs every 6 hours as needed for shortness of breath / dyspnea or wheezing    ALBUTEROL (PROVENTIL) (2.5 MG/3ML) 0.083% NEB SOLUTION    Take 1 vial (2.5 mg) by nebulization every 6 hours as needed for shortness of breath / dyspnea or wheezing    ASPIRIN (ASPIRIN) 81 MG EC TABLET    Take 1 tablet (81 mg) by mouth daily    BUDESONIDE-FORMOTEROL (SYMBICORT) 160-4.5 MCG/ACT INHALER    Inhale 2 puffs into the lungs 2 times daily    CELECOXIB (CELEBREX) 100 MG CAPSULE    Take 1 capsule (100 mg) by mouth 2 times daily    DIPHENHYDRAMINE (BENADRYL) 25 MG CAPSULE    Take 1-2 capsules (25-50 mg) by mouth nightly as needed for sleep    HYDROXYUREA 1000 MG TABS    Take 2,000 mg by mouth daily    IBUPROFEN (ADVIL/MOTRIN) 200 MG TABLET    Take 3 tablets (600 mg) by mouth every 6 hours as needed for moderate pain    JADENU 360 MG TABLET    Take 4 tablets (1,440 mg) by mouth daily    MEDROXYPROGESTERONE (DEPO-PROVERA) 150 MG/ML IM INJECTION    Inject 150 mg into the muscle    NALOXONE (NARCAN) 4 MG/0.1ML NASAL SPRAY    Spray 1 spray (4 mg) into one nostril alternating nostrils once as needed for opioid reversal every 2-3 minutes until assistance arrives    ONDANSETRON (ZOFRAN) 8 MG TABLET        OXYCODONE IR (ROXICODONE) 10 MG TABLET    Take 1 tablet (10mg) by mouth every 6 hours for pain. If, after 2 doses it is not working, try a dose at 4 hours and call clinic.    SERTRALINE (ZOLOFT) 100 MG TABLET    Take 2 tablets (200 mg) by mouth daily   Modified Medications    No medications on file   Discontinued Medications    No medications on file          Allergies   Allergen Reactions     Fish-Derived Products Hives     Seafood Hives     Contrast Dye      Diagnostic X-Ray Materials      Gadolinium         Review  "of Systems  A complete review of systems was performed with pertinent positives and negatives noted in the HPI, and all other systems negative.    Physical Exam   BP: 138/85  Pulse: 108  Temp: 98.2  F (36.8  C)  Resp: 18  Height: 162.6 cm (5' 4\")  Weight: 73.5 kg (162 lb)  SpO2: 94 %      Physical Exam  Vitals signs reviewed.   Constitutional:       General: She is not in acute distress.     Appearance: She is well-developed.   HENT:      Head: Normocephalic and atraumatic.      Mouth/Throat:      Mouth: Mucous membranes are moist.      Pharynx: No oropharyngeal exudate.   Eyes:      Extraocular Movements: Extraocular movements intact.      Conjunctiva/sclera: Conjunctivae normal.      Pupils: Pupils are equal, round, and reactive to light.   Neck:      Musculoskeletal: Normal range of motion and neck supple.   Cardiovascular:      Rate and Rhythm: Regular rhythm. Tachycardia present.      Pulses: Normal pulses.      Heart sounds: Normal heart sounds. No murmur.   Pulmonary:      Effort: Pulmonary effort is normal. No respiratory distress.      Breath sounds: Normal breath sounds. No stridor. No wheezing or rales.   Abdominal:      General: Bowel sounds are normal. There is no distension.      Palpations: Abdomen is soft. There is no mass.      Tenderness: There is no abdominal tenderness. There is no guarding or rebound.   Musculoskeletal: Normal range of motion.         General: No swelling or tenderness.   Skin:     General: Skin is warm and dry.      Capillary Refill: Capillary refill takes less than 2 seconds.      Findings: No rash.   Neurological:      General: No focal deficit present.      Mental Status: She is alert and oriented to person, place, and time.      GCS: GCS eye subscore is 4. GCS verbal subscore is 5. GCS motor subscore is 6.      Cranial Nerves: No cranial nerve deficit.      Sensory: No sensory deficit.      Motor: No weakness.   Psychiatric:         Mood and Affect: Mood normal.         ED " Course        Procedures     9:58 PM  The patient was seen and examined by Lisa Maza MD in Room ED09.                EKG Interpretation:      Interpreted by Lisa Maza MD  Time reviewed: 10 pm  Symptoms at time of EKG: PE   Rhythm: sinus tachycardia  Rate: Tachycardia  Axis: Normal  Ectopy: none  Conduction: normal  ST Segments/ T Waves: No ST-T wave changes  Q Waves: none  Comparison to prior: Unchanged    Clinical Impression: no acute changes             Labs Ordered and Resulted from Time of ED Arrival Up to the Time of Departure from the ED   CBC WITH PLATELETS DIFFERENTIAL - Abnormal; Notable for the following components:       Result Value    WBC 13.5 (*)     RBC Count 2.38 (*)     Hemoglobin 7.6 (*)     Hematocrit 21.7 (*)     RDW 21.8 (*)     Nucleated RBCs 3 (*)     Absolute Eosinophils 0.9 (*)     All other components within normal limits   INR - Abnormal; Notable for the following components:    INR 1.26 (*)     All other components within normal limits   PARTIAL THROMBOPLASTIN TIME - Abnormal; Notable for the following components:    PTT 49 (*)     All other components within normal limits   COMPREHENSIVE METABOLIC PANEL - Abnormal; Notable for the following components:    Chloride 111 (*)     Anion Gap 2 (*)     Bilirubin Total 2.9 (*)     ALT 74 (*)     AST 84 (*)     All other components within normal limits   UA MACROSCOPIC WITH REFLEX TO MICRO AND CULTURE - Abnormal; Notable for the following components:    Urobilinogen mg/dL 4.0 (*)     Leukocyte Esterase Urine Trace (*)     All other components within normal limits   RETICULOCYTE COUNT - Abnormal; Notable for the following components:    % Retic 26.0 (*)     Absolute Retic 618.8 (*)     All other components within normal limits   HCG QUALITATIVE   LACTIC ACID WHOLE BLOOD   TROPONIN I   NT PROBNP INPATIENT   SARS-COV-2 (COVID-19) VIRUS RT-PCR   MEASURE WEIGHT       Medications   heparin ANTICOAGULANT Loading dose for HIGH INTENSITY  TREATMENT * Give BEFORE starting heparin infusion (has no administration in time range)   heparin 25,000 units in 0.45% NaCl 250 mL ANTICOAGULANT  infusion (has no administration in time range)             Assessments & Plan (with Medical Decision Making)   Patient presents after being seen here yesterday for her chest pain symptoms and initial read VQ scan was reported as negative but then this morning final read did show multiple areas of perfusion deficits with concern for PE.  That she was called back here to the emergency department.  Chest x-ray at that time was unremarkable no signs of acute chest syndrome.  She here is in no acute distress.  She is mildly tachycardic but otherwise has normal vital signs and is afebrile.  Laboratory studies initiated including COVID-19 PCR.  This was negative.  Her white blood cell count is 13.5 which appears has been stably elevated at this baseline for some time.  Hemoglobin is 7.6.  Percent reticulocyte count is 26. Troponin and BNP are within normal limits.  EKG reveals sinus tachycardia without evidence of acute ischemia and no sign of right heart strain on EKG.    Spoke to hematology fellow who reviewed the chart and agreed that the patient should be admitted for further management and initiation of anticoagulation.  They were comfortable with heparin at this time.  Heparin bolus and drip were started.  Patient is in agreement with the plan for admission.  I spoke with the hospitalist team who accepted the patient for admission.  Patient's care was transferred to the medicine team in stable condition.    I have reviewed the nursing notes.    I have reviewed the findings, diagnosis, plan and need for follow up with the patient.    New Prescriptions    No medications on file       Final diagnoses:   Multiple subsegmental pulmonary emboli without acute cor pulmonale (H)     IIsaac, am serving as a trained medical scribe to document services personally performed  by Lisa Maza MD, based on the provider's statements to me.     I, Lisa Maza MD, was physically present and have reviewed and verified the accuracy of this note documented by Isaac Walton.    2/1/2021   [facility] EMERGENCY DEPARTMENT     Lisa Maza MD  03/03/21 1315

## 2021-02-02 NOTE — ED NOTES
[facility]    ED Nurse to Floor Handoff     Jennifer Cervantes is a 20 year old female who speaks English and lives with others,  in a home  They arrived in the ED by car from emergency room    ED Chief Complaint: Abnormal Labs    ED Dx;   Final diagnoses:   Multiple subsegmental pulmonary emboli without acute cor pulmonale (H)         Needed?: No    Allergies:   Allergies   Allergen Reactions     Fish-Derived Products Hives     Seafood Hives     Contrast Dye      Diagnostic X-Ray Materials      Gadolinium    .  Past Medical Hx:   Past Medical History:   Diagnosis Date     Anemia      Anxiety      Bleeding disorder (H)      Blood clotting disorder (H)      Cerebral infarction (H) 2015     Cognitive developmental delay     low IQ. 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 (H)     right hand contractures     Iron overload due to repeated red blood cell transfusions      Migraines      Oppositional defiant behavior      Uncomplicated asthma       Baseline Mental status: WDL  Current Mental Status changes: at basesline    Infection present or suspected this encounter: no  Sepsis suspected: No  Isolation type: No active isolations  Patient tested for COVID 19 prior to admission: YES     Activity level - Baseline/Home:  Independent  Activity Level - Current:   Stand with Assist    Bariatric equipment needed?: No    In the ED these meds were given:   Medications   heparin 25,000 units in 0.45% NaCl 250 mL ANTICOAGULANT  infusion (1,300 Units/hr Intravenous New Bag 2/1/21 2218)   heparin ANTICOAGULANT Loading dose for HIGH INTENSITY TREATMENT * Give BEFORE starting heparin infusion (5,900 Units Intravenous Given 2/1/21 2218)       Drips running?  Yes    Home pump  No    Current LDAs  Port A Cath Single 01/23/21 Left Chest wall (Active)    Number of days: 10       Labs results:   Labs Ordered and Resulted from Time of ED Arrival Up to the Time of Departure from the ED   CBC WITH PLATELETS DIFFERENTIAL - Abnormal; Notable for the following components:       Result Value    WBC 13.5 (*)     RBC Count 2.38 (*)     Hemoglobin 7.6 (*)     Hematocrit 21.7 (*)     RDW 21.8 (*)     Nucleated RBCs 3 (*)     Absolute Eosinophils 0.9 (*)     All other components within normal limits   INR - Abnormal; Notable for the following components:    INR 1.26 (*)     All other components within normal limits   PARTIAL THROMBOPLASTIN TIME - Abnormal; Notable for the following components:    PTT 49 (*)     All other components within normal limits   COMPREHENSIVE METABOLIC PANEL - Abnormal; Notable for the following components:    Chloride 111 (*)     Anion Gap 2 (*)     Bilirubin Total 2.9 (*)     ALT 74 (*)     AST 84 (*)     All other components within normal limits   UA MACROSCOPIC WITH REFLEX TO MICRO AND CULTURE - Abnormal; Notable for the following components:    Urobilinogen mg/dL 4.0 (*)     Leukocyte Esterase Urine Trace (*)     All other components within normal limits   RETICULOCYTE COUNT - Abnormal; Notable for the following components:    % Retic 26.0 (*)     Absolute Retic 618.8 (*)     All other components within normal limits   HCG QUALITATIVE   LACTIC ACID WHOLE BLOOD   TROPONIN I   NT PROBNP INPATIENT   SARS-COV-2 (COVID-19) VIRUS RT-PCR   MEASURE WEIGHT   NOTIFY PHYSICIAN   NOTIFY PHYSICIAN       Imaging Studies:   Recent Results (from the past 24 hour(s))   NM Lung Scan Perfusion Particulate    Narrative    Examination: NM LUNG SCAN PERFUSION PARTICULATE       Date: 2/1/2021 1:59 AM     Indication: PE suspected, low/intermediate prob, positive D-dimer;  contrast allergy.  History of Sickle Cell.    Comparison: none    Technique:    The patient received 7 mCi of Tc-99m labeled MAA intravenously.  Ventilation imaging was deferred as per department  "policy precautions  during COVID-19 pandemic. A standard eight view lung perfusion scan  was obtained. SPECT images of the lungs were obtained. CT images of  the chest were obtained for the purposes of anatomic localization and  attenuation correction only.    Findings:    Planar images and Fused SPECT-CT images demonstrate mild reduced  radiotracer uptake within the lateral right middle lobe and posterior  subsegmental right lower lobe. Additionally there is reduced  radiotracer uptake in the posterior segment of the superior left upper  lobe. No CT abnormality at these sites. No previous history of  pulmonary emboli      Impression    Impression:  At least 3 moderate subsegmental perfusion defects (right greater than  left), these findings are consistent with pulmonary emboli.     I have personally reviewed the examination and initial interpretation  and I agree with the findings.    RAJ VIDES MD       Recent vital signs:   /85   Pulse 109   Temp 98.2  F (36.8  C) (Oral)   Resp 11   Ht 1.626 m (5' 4\")   Wt 73.5 kg (162 lb)   SpO2 94%   BMI 27.81 kg/m      [place] Coma Scale Score: 15 (02/01/21 2208)       Cardiac Rhythm: Normal Sinus  Pt needs tele? See epic  Skin/wound Issues: None    Code Status: Full Code    Pain control: fair    Nausea control: good    Abnormal labs/tests/findings requiring intervention: see epic    Family present during ED course? No   Family Comments/Social Situation comments: n/a    Tasks needing completion: None    Arely Louise RN    [phone] [facility]    "

## 2021-02-02 NOTE — TELEPHONE ENCOUNTER
RECORDS RECEIVED FROM: internal/CE    DATE RECEIVED: 2.17.21    NOTES STATUS DETAILS   OFFICE NOTE from referring provider Eric Bruno   OFFICE NOTE from other specialist na     DISCHARGE SUMMARY from hospital na     DISCHARGE REPORT from the ER na     MEDICATION LIST Internal      IMAGING  (NEED IMAGES AND REPORTS)       CT SCAN na     CHEST XRAY (CXR) ce [facility]- 12.28.19   Internal- 1.31.21, 1.23.21,12.10.20, 12.7.20, 12.3.20, 4.7.20, 3.29.30, 3.28.20, 1.27.20, 10.10.19, 10.7.19, 1.18.19    TESTS       PULMONARY FUNCTION TESTING (PFT) In process       Action 2.2.21 sv   Action Taken Message sent to clinic coordinator to call pt and help scheduled PFT

## 2021-02-02 NOTE — PLAN OF CARE
"BP (!) 131/91 (BP Location: Left arm)   Pulse 113   Temp 98.2  F (36.8  C) (Oral)   Resp 20   Ht 1.626 m (5' 4\")   Wt 73 kg (161 lb)   SpO2 92%   BMI 27.64 kg/m      Reason for Admission: Pt. admitted from ED this shift for pulmonary embolism. Pt. oriented to call light & unit routines. Admission profile, PCS complete.  Neuro: Pt. alert & Ox4  Behavior: Pt. calm & cooperative with cares.   Activity: Pt. up with SBA  Vital: Pt. tachy 113, AOVSS on RA.  LDAs: NS at 75cc/hour & Heparin gtt at 1,300units/hour into Port-a-cath.  Cardiac: Tachycardia   Respiratory: LS clear bilat.  GI/: Pt. voiding spontaneously & not saving. No stools this shift. Last BM 2/1/21  Skin: Pt. refused to have buttocks, coccyx & sacrum assessed during 2 RN skin check.   Pain/Nausea: Pt. denies pain or nausea.  Diet: Regular  Labs/Imaging: Heparin 10a 0.68 & within goal. Next 10a tomorrow morning.   Plan: Continue to follow POC & notify MD with change in status.      "

## 2021-02-02 NOTE — H&P
[facility]     History and Physical - [facility]        Date of Admission:  2/1/2021    Assessment & Plan   Jennifer Cervantes is a 20 year old female admitted on 2/1/2021. She has a history of sickle cell anemia, prior CVA, asthma and depression and is admitted for acute PE found on V/Q scan.     Pulmonary Embolism  Chest Pain  D-dimer was 1.9 on 1/31, V/Q scan (initially read as negative) demonstrated at least 3 moderate subsegmental perfusion defects (right greater than left), these findings are consistent with pulmonary emboli. No hypoxia. Baseline tachycardia. Chest pain continues, currently 8/10, but able to sleep. Hx of CVA as a child, but no prior hx of DVT or PE. No history of travel or prolonged immobilization. On ASA at baseline. Not a tobacco user, not on estrogen, not pregnant.   -Heparin gtt, continue  -Hematology consult - assist with PE workup and selection of anticoagulant for discharge   -Telemetry  -Continuous pulse ox    Tachycardia  Does appear to have baseline tachycardia during other ED admissions, possibly related to pain or anemia. Current tachycardia to 110s while sleeping may be related to PE. Mild leukocytosis to 13.5, but no other signs of infection. Will monitor.   -Telemetry  -Add on magnesium  -Treat pain and PE    Sickle Cell Anemia  Hx of iron overload  Currently without pain crisis. Hgb 7.6, appears near her baseline. No signs of bleeding. Transitioned to adult hematology at the [facility]. Last saw primary hematologist, Dr. Duncan, on 1/15/21. Hx of acute chest syndrome in 10/2019.   -She does have a pain plan (see heme note 1/15/21), however as no acute crisis will not start this, but will need to start if developing increasing pain  -Daily CBC  -PTA oxycodone 10 mg q6h prn  -PTA celecoxib 100 mg BID  -PTA hydroxyurea 2,000 mg daily  -PTA Jadenu 1,440 mg daily  -PRN zofran  -NS at 75 mL/hr while admitted (LR is not compatible  with heparin gtt)    Elevated LFTs  Decreased from prior, ALT 74, AST 84, Tbili 2.9. Possibly related to history of iron overload. Last RUQ US was 4/6/2020 and normal. Has a hx of cholecystectomy.   -Consider RUQ US to further evaluate    Hx of CVA (left MCA stroke with right hemiparesis and RUE contracture)  Stroke age ~1. TIA in ~2014. Patient reports discontinuing ASA per heme recs recently. Baseline right upper extremity contracture and cognitive delay (IQ 70s on neuropsych testing). Walks without assistance at home, declined PT while inpatient.   -Discuss with hematology if patient should be on 81 mg ASA (not currently ordered)    Asthma  Under good control, no wheezing on admission exam.  -PTA symbicort 2 puffs BID  -PTA PRN albuterol    Depression  Anxiety  -PTA sertraline 200 mg daily       Diet:   Regular diet  Fluids: NS 75 mL/hr  DVT Prophylaxis: Heparin gtt  Lopez Catheter: not present  Code Status:   Full         Disposition Plan   Expected discharge: 2 - 3 days, recommended to prior living arrangement once anticoagulation plan determined for PE.  Entered: Sweetie Bernardo MD 02/02/2021, 3:07 AM       The patient's care was discussed with the Patient. The patient will be staffed in the AM.     Sweetie Bernardo MD  [facility]   Contact information available via [facility] Paging/Directory  Please see sign in/sign out for up to date coverage information  ______________________________________________________________________    Chief Complaint   Chest pain    History is obtained from the patient    History of Present Illness   Jennifer Cervantes is a 20 year old female who has a history of sickle cell anemia, prior CVA, asthma and depression and is admitted for acute PE found on V/Q scan.    Jennifer reports that she developed sharp chest pain yesterday morning.  This was different than her typical pain that she experiences with her sickle  cell pain.  Describes the pain as sharp, centrally located, no increasing pain with a deep breath.  She presented to the emergency department yesterday and had a work-up including a VQ scan that was initially read as negative.  She was called today that the VQ scan actually did show a pulmonary embolism and to return to the emergency department.  She reports that her pain has continued, rates it an 8 out of 10 now.  She reports that it does radiate to her left back at times. She does not feel like she needs any pain medication at this point.  She denies any history of DVT or PE.  She does have a history of a stroke.  She denies shortness of breath, palpitations, leg swelling.  She does not feel like she is having a sickle cell pain crisis at this point.    In the emergency department she was started on a heparin drip and admitted to the medicine team for further management.      Review of Systems    The 10 point Review of Systems is negative other than noted in the HPI or here.   Denies fevers, shortness of breath, cough, nausea, recent illness, issues with bleeding, joint pain or swelling, syncope, palpitations.     Past Medical History    I have reviewed this patient's medical history and updated it with pertinent information if needed.   Past Medical History:   Diagnosis Date     Anemia      Anxiety      Bleeding disorder (H)      Blood clotting disorder (H)      Cerebral infarction (H) 2015     Cognitive developmental delay     low IQ. 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 (H)     right hand contractures     Iron overload due to repeated red blood cell transfusions      Migraines      Oppositional defiant behavior      Uncomplicated asthma       Past Surgical History   I have reviewed this patient's surgical history and updated it with pertinent information if needed.  Past  Surgical History:   Procedure Laterality Date     AS INSERT TUNNELED CV 2 CATH W/O PORT/PUMP       C BREAST REDUCTION (INCLUDES LIPO) TIER 3 Bilateral 04/23/2019     IR CVC NON TUNNEL PLACEMENT  4/7/2020     REPAIR TENDON ELBOW Right 10/2/2019    Procedure: Right Elbow Flexor Lengthening, Flexor Pronator Slide Of Wrist and Finger, Thumb Adductor Lengthening;  Surgeon: Anai Franco MD;  Location: [facility] OR     TONSILLECTOMY Bilateral 10/2/2019    Procedure: Bilateral Tonsillectomy;  Surgeon: Farhana Guy MD;  Location: [facility] OR      Social History   I have personally reviewed the social history with the patient showing she lives at home with her mother and siblings. Denies tobacco use, alcohol use, or drug use. Not currently working.     Family History   I have reviewed this patient's family history and updated it with pertinent information if needed.  Family History   Problem Relation Age of Onset     Sickle Cell Trait Mother      Hypertension Mother      Asthma Mother      Sickle Cell Trait Father        Prior to Admission Medications   Prior to Admission Medications   Prescriptions Last Dose Informant Patient Reported? Taking?   Hydroxyurea 1000 MG TABS   No No   Sig: Take 2,000 mg by mouth daily   JADENU 360 MG tablet   No No   Sig: Take 4 tablets (1,440 mg) by mouth daily   acetaminophen (TYLENOL) 325 MG tablet   No No   Sig: Take 2 tablets (650 mg) by mouth every 6 hours as needed for mild pain   albuterol (PROAIR HFA/PROVENTIL HFA/VENTOLIN HFA) 108 (90 Base) MCG/ACT inhaler   No No   Sig: Inhale 2 puffs into the lungs every 6 hours as needed for shortness of breath / dyspnea or wheezing   albuterol (PROVENTIL) (2.5 MG/3ML) 0.083% neb solution   No No   Sig: Take 1 vial (2.5 mg) by nebulization every 6 hours as needed for shortness of breath / dyspnea or wheezing   Patient not taking: Reported on 1/27/2021   aspirin (ASPIRIN) 81 MG EC tablet   No No   Sig: Take 1 tablet (81 mg) by mouth daily    budesonide-formoterol (SYMBICORT) 160-4.5 MCG/ACT Inhaler   No No   Sig: Inhale 2 puffs into the lungs 2 times daily   celecoxib (CELEBREX) 100 MG capsule   No No   Sig: Take 1 capsule (100 mg) by mouth 2 times daily   diphenhydrAMINE (BENADRYL) 25 MG capsule   No No   Sig: Take 1-2 capsules (25-50 mg) by mouth nightly as needed for sleep   Patient not taking: Reported on 1/27/2021   ibuprofen (ADVIL/MOTRIN) 200 MG tablet   No No   Sig: Take 3 tablets (600 mg) by mouth every 6 hours as needed for moderate pain   medroxyPROGESTERone (DEPO-PROVERA) 150 MG/ML IM injection   Yes No   Sig: Inject 150 mg into the muscle   naloxone (NARCAN) 4 MG/0.1ML nasal spray   No No   Sig: Spray 1 spray (4 mg) into one nostril alternating nostrils once as needed for opioid reversal every 2-3 minutes until assistance arrives   ondansetron (ZOFRAN) 8 MG tablet   Yes No   oxyCODONE IR (ROXICODONE) 10 MG tablet   No No   Sig: Take 1 tablet (10mg) by mouth every 6 hours for pain. If, after 2 doses it is not working, try a dose at 4 hours and call clinic.   sertraline (ZOLOFT) 100 MG tablet   No No   Sig: Take 2 tablets (200 mg) by mouth daily      Facility-Administered Medications: None     Allergies   Allergies   Allergen Reactions     Fish-Derived Products Hives     Seafood Hives     Contrast Dye      Diagnostic X-Ray Materials      Gadolinium        Physical Exam   Vital Signs: Temp: 98.2  F (36.8  C) Temp src: Oral BP: 132/85 Pulse: 113   Resp: 25 SpO2: 94 % O2 Device: None (Room air)    Weight: 162 lbs 0 oz    General Appearance: initially sleeping, but easily awoke to voice, in NAD  Eyes: EOMI, PERRL  HEENT: Normocephalic, moist mucous membranes  Respiratory: CTAB, no increased WOB, no crackles or wheezes  Cardiovascular: Tachycardia to 110s while asleep, no murmur  Chest: Port site without pain, erythema or drainage  GI: Soft, nontender, nondistended  Lymph/Hematologic: no LAD  Genitourinary: No examined  Skin: No  rash  Musculoskeletal: RUE contracture  Neurologic: CN II-XII grossly intact, speech intact, RUE contracture,  on right 2/5, LUE motor testing 5/5  Psychiatric: Slightly flat affect, does not appear depressed or anxious     Data   Data reviewed today: I reviewed all medications, new labs and imaging results over the last 24 hours. I personally reviewed the EKG tracing showing sinus tachycardia and the NM lung scan image(s) showing At least 3 moderate subsegmental perfusion defects (right greater than left).    Recent Labs   Lab 02/01/21 2200 01/31/21 2033 01/27/21  0825   WBC 13.5* 13.2* 12.2*   HGB 7.6* 8.0* 8.1*   MCV 91 94 93    329 347   INR 1.26*  --   --     136 140   POTASSIUM 4.3 3.8 3.7   CHLORIDE 111* 109 113*   CO2 25 24 22   BUN 10 11 9   CR 0.61 0.57 0.54   ANIONGAP 2* 4 6   MICAH 8.6 8.6 8.6   GLC 98 84 96   ALBUMIN 3.9  --  4.1   PROTTOTAL 7.6  --  7.8   BILITOTAL 2.9*  --  3.8*   ALKPHOS 71  --  74   ALT 74*  --  84*   AST 84*  --  93*   TROPI <0.015 <0.015  --

## 2021-02-02 NOTE — PROGRESS NOTES
Care Management Initial Consult    General Information  Assessment completed with: Patient, Care Team Member, -chart review,    Type of CM/SW Visit: Initial Assessment    Primary Care Provider verified and updated as needed: Yes   Readmission within the last 30 days:        Reason for Consult: care coordination/care conference, discharge planning  Advance Care Planning:            Communication Assessment  Patient's communication style: spoken language (English or Bilingual)    Hearing Difficulty or Deaf: no   Wear Glasses or Blind: yes    Cognitive  Cognitive/Neuro/Behavioral: WDL                      Living Environment:   People in home: parent(s), sibling(s)     Current living Arrangements: house      Able to return to prior arrangements: yes       Family/Social Support:  Care provided by: self, parent(s)  Provides care for: no one     Parent(s), Sibling(s)          Description of Support System: Supportive, Involved         Current Resources:   Patient receiving home care services: No     Community Resources: Home Care  Equipment currently used at home: none  Supplies currently used at home: None    Employment/Financial:  Employment Status:          Financial Concerns: No concerns identified           Lifestyle & Psychosocial Needs:        Socioeconomic History     Marital status: Single     Spouse name: Not on file     Number of children: Not on file     Years of education: Not on file     Highest education level: Not on file     Tobacco Use     Smoking status: Never Smoker     Smokeless tobacco: Never Used   Substance and Sexual Activity     Alcohol use: Not Currently     Alcohol/week: 0.0 standard drinks     Drug use: Never     Sexual activity: Not Currently     Partners: Male     Birth control/protection: Other       Functional Status:  Prior to admission patient needed assistance: Per pt she was independent with all her own care needs.            Additional Information:  Pt admitted secondary to new PE.   Pt with a medical history significant for sickle cell anemia, prior CVA, asthma and depression.  Pt with elevated readmission risk score.   Hematology consulted.  Final anticoagulation plan pending hematology recommendations.      Met with pt.   Introduced RNCC role.   Per discussion with pt she lives with her mother who is pt PCA and ILS worker.  Pt notes no concerns regarding her ability to return home.      Met with pt.  Introduced RNCC role.   Pt states she does not have a PCP. Her previous PCP was through [facility] but that provider left to go to [facility].  Pt is followed closely by [facility].  Pt would be interested in establishing care with a new PCP at [facility].   Agreed to initiate a request through [facility] to schedule PCP appointment for post hospitalization f/u and to establish care.       RNCC/SW will continue to monitor.     Cherelle Lockhart RN BSN, PHN, ACM-RN  7A RN Care Coordinator  Phone: [phone]  Pager [phone]    2/2/2021 1:53 PM

## 2021-02-02 NOTE — PROGRESS NOTES
[facility]     Progress Note - Tika 4 Service        Date of Admission:  2/1/2021    Changes today:  - Heme consult for assistance with anticoagulation  - CXR and albuterol neb for wheezing  - echo    Assessment & Plan    Jennifer Cervantes is a 20 year old female admitted on 2/1/2021. She has a history of sickle cell anemia, prior CVA, asthma and depression and is admitted for acute PE found on V/Q scan.      Pulmonary Embolism  Chest Pain  D-dimer was 1.9 on 1/31, V/Q scan (initially read as negative) demonstrated at least 3 moderate subsegmental perfusion defects (right greater than left), these findings are consistent with pulmonary emboli. No hypoxia. Baseline tachycardia. Chest pain continues, currently 8/10, but able to sleep. Hx of CVA as a child, but no prior hx of DVT or PE. No history of travel or prolonged immobilization. On ASA at baseline. Not a tobacco user, not on estrogen, not pregnant.   -Heparin gtt, continue  -Hematology consult - assist with PE workup and selection of anticoagulant for discharge   -Telemetry  -Continuous pulse ox  - Echo, though low suspicion for right heart strain given normal trop and no evidence on EKG     Tachycardia  Does appear to have baseline tachycardia during other ED admissions, possibly related to pain or anemia. Current tachycardia to 110s while sleeping may be related to PE. Mild leukocytosis to 13.5, but no other signs of infection. Will monitor.   -Telemetry  -Treat pain and PE     Sickle Cell Anemia  Hx of iron overload  Currently without pain crisis. Hgb 7.6, appears near her baseline. No signs of bleeding. Transitioned to adult hematology at the [facility]. Last saw primary hematologist, Dr. Duncan, on 1/15/21. Hx of acute chest syndrome in 10/2019.   -She does have a pain plan (see heme note 1/15/21), however as no acute crisis will not start this, but will need to start if developing increasing pain  -Daily  CBC  -PTA oxycodone 10 mg q6h prn  -PTA celecoxib 100 mg BID  -PTA hydroxyurea 2,000 mg daily  -HOLD PTA Jadenu 1,440 mg daily given expensive and non-formulary  -PRN zofran     Elevated LFTs  Decreased from prior, ALT 74, AST 84, Tbili 2.9. Possibly related to history of iron overload. Last RUQ US was 4/6/2020 and normal. Has a hx of cholecystectomy.   -continue to monitor     Hx of CVA (left MCA stroke with right hemiparesis and RUE contracture)  Stroke age ~1. TIA in ~2014. Patient reports discontinuing ASA per heme recs recently. Baseline right upper extremity contracture and cognitive delay (IQ 70s on neuropsych testing). Walks without assistance at home, declined PT while inpatient.      Asthma  Developed wheezing and shortness of breath on am of 2/2. Given albuterol neb with improvement in symptoms and exam. Cannot remember the last time she required steroids for asthma exacerbation.  -PTA symbicort 2 puffs BID  -PTA PRN albuterol inhaler and nebulizer  -CXR  -If wheezing does not improve with nebulizer, will start prednisone 40mg daily x 5 days     Depression  Anxiety  -PTA sertraline 200 mg daily     Diet: Combination Diet Regular Diet Adult    Fluids: None  Lines: Port-a-cath  DVT Prophylaxis: Heparin gtt  Lopez Catheter: not present  Code Status: Full Code           Disposition Plan   Expected discharge: Tomorrow, recommended to prior living arrangement once anticoagulation plan developed and breathing at baseline.  Entered: Echo Russ MD 02/02/2021, 3:52 PM       The patient's care was discussed with the Attending Physician, Dr. Tinajero and Patient.    Echo Russ MD  [address]   Please see sign in/sign out for up to date coverage information  ______________________________________________________________________    Interval History   Admitted overnight. Having some shortness of breath this morning that feels  like her asthma flaring up. Has noticed some wheezing as well. No chest pain, cough, abdominal pain, n/v.    Data reviewed today: I reviewed all medications, new labs and imaging results over the last 24 hours. I personally reviewed no images or EKG's today.    Physical Exam   Vital Signs: Temp: 98.3  F (36.8  C) Temp src: Oral BP: 128/74 Pulse: 99   Resp: 16 SpO2: 93 % O2 Device: None (Room air)    Weight: 161 lbs 0 oz  Gen: Awake, alert, no acute distress  HEENT: NCAT anicteric sclerae  CV: tachycardic regular rhythm  Lungs: Expiratory wheezing heard in all lung fields  Abd: Soft, nondistended, nontender  Ext: WWP, no edema, RUE contracture    Data   Recent Labs   Lab 02/02/21  0348 02/01/21  2200 01/31/21 2033 01/27/21  0825   WBC 15.6* 13.5* 13.2* 12.2*   HGB 7.3* 7.6* 8.0* 8.1*   MCV 94 91 94 93    352 329 347   INR  --  1.26*  --   --     138 136 140   POTASSIUM 4.2 4.3 3.8 3.7   CHLORIDE 112* 111* 109 113*   CO2 26 25 24 22   BUN 12 10 11 9   CR 0.62 0.61 0.57 0.54   ANIONGAP 2* 2* 4 6   MICAH 9.0 8.6 8.6 8.6   GLC 93 98 84 96   ALBUMIN  --  3.9  --  4.1   PROTTOTAL  --  7.6  --  7.8   BILITOTAL  --  2.9*  --  3.8*   ALKPHOS  --  71  --  74   ALT  --  74*  --  84*   AST  --  84*  --  93*   TROPI  --  <0.015 <0.015  --      Recent Results (from the past 24 hour(s))   XR Chest Port 1 View    Narrative    EXAM: XR CHEST PORT 1 VW  2/2/2021 9:26 AM     HISTORY:  increasing leukocytosis, chest pain in sickle cell patient        COMPARISON:  2/1/2021    FINDINGS: AP radiograph of the chest. Left Port-A-Cath tip terminates  in the superior cavoatrial junction. The mediastinal border, cardiac  silhouette, and pulmonary vasculature are within normal limits. No  focal airspace opacities. No pneumothorax. No pleural effusions.      Impression    IMPRESSION: No focal airspace disease.    I have personally reviewed the examination and initial interpretation  and I agree with the findings.    VIOLA ESCOBAR MD    Echocardiogram Complete    Narrative    [id number]  [id number]  [id number]  [id number]^LOUISA^ERNA^DIAMANTE NAYE           [facility],[place]  Echocardiography Laboratory  [address] [place] [id number]     Name: HIMANSHU AL  MRN: [id number]  : 2000  Study Date: 2021 09:39 AM  Age: 20 yrs  Gender: Female  Patient [facility]: [facility]  Reason For Study: Pulmonary Emboli  Ordering Physician: ERNA JASSO  Performed By: Ayse Kelly RDCS     BSA: 1.8 m2  Height: 64 in  Weight: 161 lb  HR: 104  BP: 129/80 mmHg  _____________________________________________________________________________  __        Procedure  Echocardiogram with two-dimensional, color and spectral Doppler performed.  _____________________________________________________________________________  __        Interpretation Summary  Left ventricular function, chamber size, wall motion, and wall thickness are  normal.The EF is 55-60%.  Right ventricular function, chamber size, wall motion, and thickness are  normal.  No pericardial effusion is present.  _____________________________________________________________________________  __        Left Ventricle  Left ventricular function, chamber size, wall motion, and wall thickness are  normal.The EF is 55-60%. Diastolic function not assessed due to tachycardia.     Right Ventricle  Right ventricular function, chamber size, wall motion, and thickness are  normal.     Atria  Both atria appear normal.     Mitral Valve  The mitral valve is normal. Trace mitral insufficiency is present.        Aortic Valve  Aortic valve is normal in structure and function. The aortic valve is  tricuspid.     Tricuspid Valve  The tricuspid valve is normal. Mild tricuspid insufficiency is present. The  right ventricular systolic pressure is approximated at 20.9 mmHg plus the  right atrial pressure. Pulmonary artery systolic pressure is normal.     Pulmonic Valve  The pulmonic valve is  normal.     Vessels  The aorta root is normal. The thoracic aorta is normal. The pulmonary artery  cannot be assessed. The inferior vena cava was normal in size with preserved  respiratory variability.     Pericardium  No pericardial effusion is present.        Compared to Previous Study  There is no prior study for direct comparison.  _____________________________________________________________________________  __  MMode/2D Measurements & Calculations     IVSd: 0.81 cm  LVIDd: 5.1 cm  LVIDs: 3.8 cm  LVPWd: 0.82 cm  FS: 26.5 %  LV mass(C)d: 145.2 grams  LV mass(C)dI: 81.4 grams/m2  asc Aorta Diam: 2.5 cm  LVOT diam: 2.1 cm  LVOT area: 3.5 cm2  LA Volume Index (BP): 29.9 ml/m2  RWT: 0.32  TAPSE: 2.4 cm           Doppler Measurements & Calculations  MV E max rigo: 123.8 cm/sec  MV A max rigo: 114.1 cm/sec  MV E/A: 1.1  MV dec slope: 848.9 cm/sec2  MV dec time: 0.15 sec  PA acc time: 0.10 sec  TR max rigo: 228.4 cm/sec  TR max P.9 mmHg  E/E' av.5  Lateral E/e': 8.0  Medial E/e': 9.0     _____________________________________________________________________________  __           Report approved by: MD Wilian Adler 2021 10:39 AM

## 2021-02-03 ENCOUNTER — PATIENT OUTREACH (OUTPATIENT)
Dept: CARE COORDINATION | Facility: CLINIC | Age: 21
End: 2021-02-03

## 2021-02-03 VITALS
BODY MASS INDEX: 27.49 KG/M2 | WEIGHT: 161 LBS | DIASTOLIC BLOOD PRESSURE: 86 MMHG | HEART RATE: 104 BPM | TEMPERATURE: 98.7 F | RESPIRATION RATE: 16 BRPM | OXYGEN SATURATION: 92 % | SYSTOLIC BLOOD PRESSURE: 134 MMHG | HEIGHT: 64 IN

## 2021-02-03 DIAGNOSIS — D57.00 SICKLE CELL PAIN CRISIS (H): Primary | ICD-10-CM

## 2021-02-03 LAB
ERYTHROCYTE [DISTWIDTH] IN BLOOD BY AUTOMATED COUNT: 22.1 % (ref 10–15)
HCT VFR BLD AUTO: 22.5 % (ref 35–47)
HGB BLD-MCNC: 7.8 G/DL (ref 11.7–15.7)
MCH RBC QN AUTO: 32.9 PG (ref 26.5–33)
MCHC RBC AUTO-ENTMCNC: 34.7 G/DL (ref 31.5–36.5)
MCV RBC AUTO: 95 FL (ref 78–100)
PLATELET # BLD AUTO: 350 10E9/L (ref 150–450)
RBC # BLD AUTO: 2.37 10E12/L (ref 3.8–5.2)
WBC # BLD AUTO: 11.7 10E9/L (ref 4–11)

## 2021-02-03 PROCEDURE — 36592 COLLECT BLOOD FROM PICC: CPT | Performed by: STUDENT IN AN ORGANIZED HEALTH CARE EDUCATION/TRAINING PROGRAM

## 2021-02-03 PROCEDURE — 85027 COMPLETE CBC AUTOMATED: CPT | Performed by: STUDENT IN AN ORGANIZED HEALTH CARE EDUCATION/TRAINING PROGRAM

## 2021-02-03 PROCEDURE — 250N000013 HC RX MED GY IP 250 OP 250 PS 637: Performed by: STUDENT IN AN ORGANIZED HEALTH CARE EDUCATION/TRAINING PROGRAM

## 2021-02-03 PROCEDURE — 99239 HOSP IP/OBS DSCHRG MGMT >30: CPT | Performed by: STUDENT IN AN ORGANIZED HEALTH CARE EDUCATION/TRAINING PROGRAM

## 2021-02-03 RX ORDER — LIDOCAINE 4 G/G
2 PATCH TOPICAL
Status: DISCONTINUED | OUTPATIENT
Start: 2021-02-03 | End: 2021-02-03 | Stop reason: HOSPADM

## 2021-02-03 RX ORDER — OXYCODONE HYDROCHLORIDE 10 MG/1
10 TABLET ORAL EVERY 6 HOURS PRN
Qty: 60 TABLET | Refills: 0 | Status: SHIPPED | OUTPATIENT
Start: 2021-02-03 | End: 2021-02-17

## 2021-02-03 RX ADMIN — CELECOXIB 100 MG: 100 CAPSULE ORAL at 08:21

## 2021-02-03 RX ADMIN — SERTRALINE HYDROCHLORIDE 200 MG: 100 TABLET ORAL at 08:21

## 2021-02-03 RX ADMIN — OXYCODONE HYDROCHLORIDE 10 MG: 5 TABLET ORAL at 08:20

## 2021-02-03 RX ADMIN — RIVAROXABAN 15 MG: 15 TABLET, FILM COATED ORAL at 08:24

## 2021-02-03 RX ADMIN — ACETAMINOPHEN 650 MG: 325 TABLET, FILM COATED ORAL at 05:25

## 2021-02-03 RX ADMIN — FLUTICASONE FUROATE AND VILANTEROL TRIFENATATE 1 PUFF: 200; 25 POWDER RESPIRATORY (INHALATION) at 08:21

## 2021-02-03 ASSESSMENT — ACTIVITIES OF DAILY LIVING (ADL)
ADLS_ACUITY_SCORE: 13

## 2021-02-03 NOTE — PLAN OF CARE
"/78 (BP Location: Right arm)   Pulse 104   Temp 99  F (37.2  C) (Oral)   Resp 16   Ht 1.626 m (5' 4\")   Wt 73 kg (161 lb)   SpO2 93%   BMI 27.64 kg/m     Shift: 8432-7446  VS: VSS on RA, afebrile.   Neuro: AOx4  Respiratory: No c/o SOB  Pain/Nausea/PRN: C/o lower back pain, received tylenol x1 and applied heat.   Diet: Reg  LDA: R port heparin locked  GI/: Voiding adequately, not saving. LBM 2/1  Mobility: IND  Plan: Probable discharge home today.     Will continue with POC, will notify MD with changes or concerns.     "

## 2021-02-03 NOTE — PROGRESS NOTES
Care Management Discharge Note    Discharge Date: 02/05/21       Discharge Disposition: Home    Discharge Services: PCA    Discharge DME: None    Discharge Transportation: family or friend will provide    Patient/family educated on Medicare website which has current facility and service quality ratings: no    Education Provided on the Discharge Plan:  Yes  Persons Notified of Discharge Plans: Team and patient  Patient/Family in Agreement with the Plan: yes    Handoff Referral Completed: Yes    Additional Information:  Pt status reviewed during care team rounds.  Team anticipates pt will be able to discharge home today.      Referral made to [facility] requesting assistance in securing a new PCP for patient.  Pt had requested a new PCP at the [facility].    Received f/u call regarding scheduling request new PCP would be available 1/18 at the earliest.  Confirmed with provider team that PCP appt on 1/18 would be fine as pt is followed closely by [facility].    Cherelle Lockhart RN BSN, PHN, ACM-RN  7A RN Care Coordinator  Phone: [phone]  Pager [phone]    2/3/2021 2:08 PM

## 2021-02-03 NOTE — TELEPHONE ENCOUNTER
Narcotic Refill Request    Date of most recent appointment:  2/3/21- seen by physicians in hospital  Next upcoming appointment:   2/5/2021 with Dr. Duncan    Medication(s) requested:  Oxycodone  Quantity:  60 tablets  Route: Oral  Last fill date and by whom:  1/18/21  Person requesting refill:  Patient   Reviewed: not an agent    Does pt have enough for today? Yes How many days left? 4 tablets left- 1 days.   What pain is the medication treating: sickle cell pain  Is pain being adequately controlled on the current regimen?: Yes  Experiencing any side effects from medication?: denies  Other Notes:  Denies any other symptoms.    Prescribing provider(s):  Routed & paged to Dr. Duncan      Called and relayed information to Pt, who verbalized understanding that prescription is ready for .

## 2021-02-03 NOTE — PLAN OF CARE
"BP (!) 129/98 (BP Location: Right arm)   Pulse 107   Temp 98  F (36.7  C) (Oral)   Resp 16   Ht 1.626 m (5' 4\")   Wt 73 kg (161 lb)   SpO2 92%   BMI 27.64 kg/m      [id number]. A&Ox4, admitted on 02/01/21 due to chest pain found to have PE. Hep gtt discontinued and transitioned to Xarelto 15 mg PO BID. Denied chest pain, and denied SOB even when taking a deep breath. RUE contracture since 1 year old because of stoke per pt report. Independent for transfer. Port caht hep locked. Triggered sepsis, LA ordered. No bm this shift, last bm yesterday. Voiding spontaneously without difficult in the toilet. Hoping she will be discharge home tomorrow.   "

## 2021-02-03 NOTE — DISCHARGE SUMMARY
[facility]   Discharge Summary - Medicine & Pediatrics       Date of Admission:  2/1/2021  Date of Discharge:  2/3/2021  Discharging Provider: Dr. Tinajero  Discharge Service: Tika Espinosa    Discharge Diagnoses   Pulmonary Emboli  Sickle Cell Anemia  Hx of iron overload  Elevated transaminases  History of CVA   Asthma  Depression  Anxiety      Follow-ups Needed After Discharge   Follow-up Appointments     Adult [facility] Follow-up and recommended labs and tests      Follow up with primary care provider, Physician No Ref-Primary, within 7   days to evaluate medication change and for hospital follow- up.      Follow up with your hematologist as previously scheduled.     Appointments on [place] and/or [facility] (with [facility] or [facility]   provider or service). Call [phone] if you haven't heard regarding   these appointments within 7 days of discharge.             Unresulted Labs Ordered in the Past 30 Days of this Admission     No orders found from 1/2/2021 to 2/2/2021.          Discharge Disposition   Discharged to home  Condition at discharge: Good    Hospital Course   Jennifer Cervantes is a 20 year old female admitted on 2/1/2021. She has a history of sickle cell anemia, prior CVA, asthma and depression and is admitted for acute PE found on V/Q scan.   The following problems were addressed during her hospitalization:    Pulmonary Embolism  Chest Pain  D-dimer was 1.9 on 1/31, V/Q scan completed 1/31 (initially read as negative) on re-read demonstrated at least 3 moderate subsegmental perfusion defects (right greater than left), these findings are consistent with pulmonary emboli. No hypoxia. Baseline tachycardia. No history of travel or prolonged immobilization. On ASA at baseline. Not a tobacco user, not on estrogen, not pregnant. Echo without evidence of right heart strain.  -Rivaroxaban 15mg BID x 21 days then 20mg daily   -Hematology Follow-up    Sickle Cell Anemia  Hx  of iron overload  Currently without pain crisis. Hgb 7.6, appears near her baseline. No signs of bleeding. Transitioned to adult hematology at the [facility]. Last saw primary hematologist, Dr. Duncan, on 1/15/21. Hx of acute chest syndrome in 10/2019. On day of discharge developed low back pain consistent with sickle cell pain. Encouraged PO fluids and monitored patient throughout the morning to ensure pain could be managed by home pain regimen. Patient reports she has sufficient supply of pain medications at home and that if the pain became unmanageable at home she would go to the infusion clinic. In discussion with patient, she felt safe discharging home.  - Continue PTA pain regimen     Elevated LFTs  Decreased from prior, ALT 74, AST 84, Tbili 2.9. Possibly related to history of iron overload. Last RUQ US was 4/6/2020 and normal. Has a hx of cholecystectomy.  No RUQ pain.     Hx of CVA (left MCA stroke with right hemiparesis and RUE contracture)  Stroke age ~1. TIA in ~2014. Patient reports discontinuing ASA per heme recs recently. Baseline right upper extremity contracture and cognitive delay (IQ 70s on neuropsych testing). Walks without assistance at home, declined PT while inpatient.      Asthma  Developed wheezing and shortness of breath on am of 2/2. Given albuterol neb with improvement in symptoms and exam. CXR without hyperinflation or infiltrate. She cannot remember the last time she required steroids for asthma exacerbation. No recurrence of symptoms after nebulizer and did not require steroids. Patient reports she is able to manage her asthma at home with her inhalers and PRN nebulizer.     Depression  Anxiety  -PTA sertraline 200 mg daily    Consultations This Hospital Stay   PHARMACY IP CONSULT  PHARMACY IP CONSULT  PHARMACY IP CONSULT  PHARMACY IP CONSULT  HEMATOLOGY ADULT IP CONSULT  CARE MANAGEMENT / SOCIAL WORK IP CONSULT  MEDICATION HISTORY IP PHARMACY CONSULT    Code Status   Full Code       The  patient was discussed with Dr. Tanvi Russ MD  [address] [address]  Phone: [phone]  ______________________________________________________________________    Physical Exam   Vital Signs: Temp: 98.7  F (37.1  C) Temp src: Oral BP: 134/86 Pulse: 104   Resp: 16 SpO2: 92 % O2 Device: None (Room air)    Weight: 161 lbs 0 oz  Gen: Awake, alert, no acute distress  HEENT: NCAT anicteric sclerae  CV: RRR no m/g/r  Lungs: CTAB no wheezing or crackles  Abd: Soft, nondistended, +BS  Ext: WWP  Back: no tenderness to palpation      Primary Care Physician   Physician No Ref-Primary    Discharge Orders      Reason for your hospital stay    You were admitted to the hospital with blood clots in your lungs. You were started on a blood thinner to treat these clots. You will continue this blood thinner for at least 3 months. You will follow up with your hematologist to discuss a formal end date of the blood thinner.     Adult [facility] Follow-up and recommended labs and tests    Follow up with primary care provider, Physician No Ref-Primary, within 7 days to evaluate medication change and for hospital follow- up.      Follow up with your hematologist as previously scheduled.     Appointments on [place] and/or [facility] (with [facility] or [facility] provider or service). Call [phone] if you haven't heard regarding these appointments within 7 days of discharge.     Activity    Your activity upon discharge: activity as tolerated     When to contact your care team    Call your primary doctor if you have any of the following:  increased shortness of breath, increased pain or any other symptoms that concern you.     Discharge Instructions    For your Rivaroxaban (blood thinner), you will take 15mg twice a day with breakfast and dinner for 3 weeks. Then you will take 20mg once each day with dinner.     Full Code     Diet     Follow this diet upon discharge: Orders Placed This Encounter      Combination Diet Regular Diet Adult       Significant Results and Procedures   Most Recent 3 CBC's:  Recent Labs   Lab Test 21  0628 218 21   WBC 11.7* 15.6* 13.5*   HGB 7.8* 7.3* 7.6*   MCV 95 94 91    295 352     Most Recent 3 BMP's:  Recent Labs   Lab Test 21  0348 21    138 136   POTASSIUM 4.2 4.3 3.8   CHLORIDE 112* 111* 109   CO2 26 25 24   BUN 12 10 11   CR 0.62 0.61 0.57   ANIONGAP 2* 2* 4   MICAH 9.0 8.6 8.6   GLC 93 98 84     Most Recent 2 LFT's:  Recent Labs   Lab Test 21  0825   AST 84* 93*   ALT 74* 84*   ALKPHOS 71 74   BILITOTAL 2.9* 3.8*     Most Recent 3 INR's:  Recent Labs   Lab Test 21  0641 20  1321   INR 1.26* 1.31* 1.28*     Most Recent D-dimer:  Recent Labs   Lab Test 21   DD 1.9*   ,   Results for orders placed or performed during the hospital encounter of 21   XR Chest Port 1 View    Narrative    EXAM: XR CHEST PORT 1 VW  2021 9:26 AM     HISTORY:  increasing leukocytosis, chest pain in sickle cell patient        COMPARISON:  2021    FINDINGS: AP radiograph of the chest. Left Port-A-Cath tip terminates  in the superior cavoatrial junction. The mediastinal border, cardiac  silhouette, and pulmonary vasculature are within normal limits. No  focal airspace opacities. No pneumothorax. No pleural effusions.      Impression    IMPRESSION: No focal airspace disease.    I have personally reviewed the examination and initial interpretation  and I agree with the findings.    VIOLA ESCOBAR MD   Echocardiogram Complete    Narrative    [id number]  [id number]  [id number]  [id number]^LOUISA^ERNA^DIAMANTE YANCEY           [facility]  Echocardiography Laboratory  [address] [place] [id number]     Name: HIMANSHU AL  MRN: [id number]  : 2000  Study Date:  02/02/2021 09:39 AM  Age: 20 yrs  Gender: Female  Patient Location: [facility]  Reason For Study: Pulmonary Emboli  Ordering Physician: ERNA JASSO  Performed By: Ayse Kelly RDCS     BSA: 1.8 m2  Height: 64 in  Weight: 161 lb  HR: 104  BP: 129/80 mmHg  _____________________________________________________________________________  __        Procedure  Echocardiogram with two-dimensional, color and spectral Doppler performed.  _____________________________________________________________________________  __        Interpretation Summary  Left ventricular function, chamber size, wall motion, and wall thickness are  normal.The EF is 55-60%.  Right ventricular function, chamber size, wall motion, and thickness are  normal.  No pericardial effusion is present.  _____________________________________________________________________________  __        Left Ventricle  Left ventricular function, chamber size, wall motion, and wall thickness are  normal.The EF is 55-60%. Diastolic function not assessed due to tachycardia.     Right Ventricle  Right ventricular function, chamber size, wall motion, and thickness are  normal.     Atria  Both atria appear normal.     Mitral Valve  The mitral valve is normal. Trace mitral insufficiency is present.        Aortic Valve  Aortic valve is normal in structure and function. The aortic valve is  tricuspid.     Tricuspid Valve  The tricuspid valve is normal. Mild tricuspid insufficiency is present. The  right ventricular systolic pressure is approximated at 20.9 mmHg plus the  right atrial pressure. Pulmonary artery systolic pressure is normal.     Pulmonic Valve  The pulmonic valve is normal.     Vessels  The aorta root is normal. The thoracic aorta is normal. The pulmonary artery  cannot be assessed. The inferior vena cava was normal in size with preserved  respiratory variability.     Pericardium  No pericardial effusion is present.        Compared to Previous Study  There is no prior  study for direct comparison.  _____________________________________________________________________________  __  MMode/2D Measurements & Calculations     IVSd: 0.81 cm  LVIDd: 5.1 cm  LVIDs: 3.8 cm  LVPWd: 0.82 cm  FS: 26.5 %  LV mass(C)d: 145.2 grams  LV mass(C)dI: 81.4 grams/m2  asc Aorta Diam: 2.5 cm  LVOT diam: 2.1 cm  LVOT area: 3.5 cm2  LA Volume Index (BP): 29.9 ml/m2  RWT: 0.32  TAPSE: 2.4 cm           Doppler Measurements & Calculations  MV E max rigo: 123.8 cm/sec  MV A max rigo: 114.1 cm/sec  MV E/A: 1.1  MV dec slope: 848.9 cm/sec2  MV dec time: 0.15 sec  PA acc time: 0.10 sec  TR max rigo: 228.4 cm/sec  TR max P.9 mmHg  E/E' av.5  Lateral E/e': 8.0  Medial E/e': 9.0     _____________________________________________________________________________  __           Report approved by: MD Wilian Adler 2021 10:39 AM            Discharge Medications   Current Discharge Medication List      START taking these medications    Details   !! rivaroxaban ANTICOAGULANT (XARELTO) 15 MG TABS tablet Take 1 tablet (15 mg) by mouth 2 times daily (with meals) for 27 days  Qty: 54 tablet, Refills: 0    Associated Diagnoses: Other acute pulmonary embolism without acute cor pulmonale (H)      !! rivaroxaban ANTICOAGULANT (XARELTO) 20 MG TABS tablet Take 1 tablet (20 mg) by mouth daily (with dinner)  Qty: 63 tablet, Refills: 0    Associated Diagnoses: Other acute pulmonary embolism without acute cor pulmonale (H)       !! - Potential duplicate medications found. Please discuss with provider.      CONTINUE these medications which have NOT CHANGED    Details   acetaminophen (TYLENOL) 325 MG tablet Take 2 tablets (650 mg) by mouth every 6 hours as needed for mild pain  Qty: 120 tablet, Refills: 3    Associated Diagnoses: Sickle cell crisis (H)      albuterol (PROAIR HFA/PROVENTIL HFA/VENTOLIN HFA) 108 (90 Base) MCG/ACT inhaler Inhale 2 puffs into the lungs every 6 hours as needed for shortness of  breath / dyspnea or wheezing  Qty: 8.5 g, Refills: 3    Comments: Pharmacy may dispense brand covered by insurance (Proair, or proventil or ventolin or generic albuterol inhaler)  Associated Diagnoses: Asthmatic bronchitis without complication, unspecified asthma severity, unspecified whether persistent      albuterol (PROVENTIL) (2.5 MG/3ML) 0.083% neb solution Take 1 vial (2.5 mg) by nebulization every 6 hours as needed for shortness of breath / dyspnea or wheezing  Qty: 12 mL, Refills: 4    Associated Diagnoses: 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); Asthmatic bronchitis without complication, unspecified asthma severity, unspecified whether persistent      budesonide-formoterol (SYMBICORT) 160-4.5 MCG/ACT Inhaler Inhale 2 puffs into the lungs 2 times daily  Qty: 10.2 g, Refills: 3    Associated Diagnoses: Asthmatic bronchitis without complication, unspecified asthma severity, unspecified whether persistent      celecoxib (CELEBREX) 100 MG capsule Take 1 capsule (100 mg) by mouth 2 times daily  Qty: 60 capsule, Refills: 3    Associated Diagnoses: 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)      diphenhydrAMINE (BENADRYL) 25 MG capsule Take 1-2 capsules (25-50 mg) by mouth nightly as needed for sleep  Qty: 60 capsule, Refills: 3    Associated Diagnoses: Insomnia, unspecified type      Hydroxyurea 1000 MG TABS Take 2,000 mg by mouth daily  Qty: 60 tablet, Refills: 3    Associated Diagnoses: 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 oxyCODONE  IR (ROXICODONE) 10 MG tablet Take 1 tablet (10mg) by mouth every 6 hours for pain. If, after 2 doses it is not working, try a dose at 4 hours and call clinic.  Qty: 60 tablet, Refills: 0    Comments: Please call patient when ready for .  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 oxyCODONE IR (ROXICODONE) 10 MG tablet Take 1 tablet (10 mg) by mouth every 6 hours as needed for severe pain  Qty: 60 tablet, Refills: 0    Associated Diagnoses: Sickle cell pain crisis (H)       !! - Potential duplicate medications found. Please discuss with provider.        Allergies   Allergies   Allergen Reactions     Fish-Derived Products Hives     Seafood Hives     Contrast Dye      Diagnostic X-Ray Materials      Gadolinium

## 2021-02-03 NOTE — PLAN OF CARE
Pt discharged home at 1545 stable with mother. VSS. Discharged summary explained to pt and verbalized understanding. All belongings returned and concerns addressed.

## 2021-02-04 NOTE — PROGRESS NOTES
The patient is scheduled for clinic follow up within 24-48 hours of hospital discharge. No post-hospital call is needed at this time.        Debbi Muñoz CMA,  Post Hospital Discharge Team

## 2021-02-05 ENCOUNTER — VIRTUAL VISIT (OUTPATIENT)
Dept: ONCOLOGY | Facility: CLINIC | Age: 21
End: 2021-02-05
Attending: PEDIATRICS
Payer: COMMERCIAL

## 2021-02-05 ENCOUNTER — TELEPHONE (OUTPATIENT)
Dept: PULMONOLOGY | Facility: CLINIC | Age: 21
End: 2021-02-05

## 2021-02-05 DIAGNOSIS — E83.111 HEMOCHROMATOSIS DUE TO REPEATED RED BLOOD CELL TRANSFUSIONS: ICD-10-CM

## 2021-02-05 DIAGNOSIS — I63.9 CEREBRAL INFARCTION, UNSPECIFIED MECHANISM (H): ICD-10-CM

## 2021-02-05 DIAGNOSIS — D57.1 HB-SS DISEASE WITHOUT CRISIS (H): Primary | ICD-10-CM

## 2021-02-05 DIAGNOSIS — I26.94 MULTIPLE SUBSEGMENTAL PULMONARY EMBOLI WITHOUT ACUTE COR PULMONALE (H): ICD-10-CM

## 2021-02-05 DIAGNOSIS — J45.909 ASTHMA, UNSPECIFIED ASTHMA SEVERITY, UNSPECIFIED WHETHER COMPLICATED, UNSPECIFIED WHETHER PERSISTENT: ICD-10-CM

## 2021-02-05 DIAGNOSIS — F81.9 COGNITIVE DEVELOPMENTAL DELAY: ICD-10-CM

## 2021-02-05 DIAGNOSIS — G89.4 CHRONIC PAIN SYNDROME: ICD-10-CM

## 2021-02-05 PROCEDURE — 999N001193 HC VIDEO/TELEPHONE VISIT; NO CHARGE

## 2021-02-05 PROCEDURE — 99215 OFFICE O/P EST HI 40 MIN: CPT | Mod: GT | Performed by: PEDIATRICS

## 2021-02-05 RX ORDER — CELECOXIB 100 MG/1
100 CAPSULE ORAL 2 TIMES DAILY
Qty: 60 CAPSULE | Refills: 3 | Status: SHIPPED | OUTPATIENT
Start: 2021-02-05 | End: 2021-06-04

## 2021-02-05 NOTE — LETTER
"    2/5/2021         RE: Jennifer Cervantes  [address]  [facility] [address]      Sickle Cell Outpatient Follow Up Visit      Date of encounter: Feb 5, 2021    Jennifer is a 20 year old who is being evaluated via a billable video visit.      How would you like to obtain your AVS? MyChart  If the video visit is dropped, the invitation should be resent by: Text to cell phone: [phone]  Will anyone else be joining your video visit? No    I have reviewed and updated the patient's allergies and medication list.    Concerns: No new concerns.   Refills: None needed.        Vitals - Patient Reported  Weight (Patient Reported): 73 kg (160 lb 15 oz)  Height (Patient Reported): 162.6 cm (5' 4\")  BMI (Based on Pt Reported Ht/Wt): 27.62  Pain Score: Severe Pain (7)  Pain Loc: Low Back    Ember Campos CMA      Video-Visit Details    Type of service:  Video Visit    Video Start Time: 2:41 PM  Video End Time: 3:02 PM  Duration: 21 minutes  Originating Location (pt. Location): Home    Distant Location (provider location):  [facility]     Platform used for Video Visit: Clarisa    ---------------------------------  Jennifer Cervantes is a 20 year old female who is being seen via video for hospital follow up.      Interval History: Jennifer has done well overall in the past year in terms of hospitalization and for most of 2020, even staying out of the ED. She felt comfortable weaning her Oxycontin off and that seems to have lead to more ED visits as was noted during last visit. She still has had a few since that visit and earlier this week, she came in with some chest pain too. She was deemed well enough to discharge home but the ED did have concern for PE and did a V/Q scan. Apparently the initial report was unremarkable but follow up re-read found three areas of V/Q defects, so she was called and told she had PEs and needed to return to the ED (2/1/2021). She was not super enthused about coming back to the " "ED, but I discussed her situation with her and wanted to ensure proper dosing and teaching was performed, so she was admitted for 2 days to initiate anticoagulation. She went home 2 days ago. She said her pain level is \"ok\" but is getting a bit frustrated about having to come to the ED more. She is wondering what more we can do. She said she is tolerating her Jadenu and rivaroxaban. She was able to report back her dosing regimen for anticoagulation. No reports of bleeding. She is appropriately holding her ASA.    History of Present Illness:  (Initial visit remarks from Dr. 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 She also has significant anxiety and depression with a strong anxiety about transferring to the adult clinic. She usually has pain crises secondary to the anxiety.      --------------------------------  Plan last reviewed with patient: 2/6/2021    Patient background: 21yo F, enjoys movies and kids though there are times where she does not really want to talk to people. Does not have a lot of social support at home.     Sickle Cell Disease History  Primary Hematologist: Perla  PCP: Raul  Genotype: SS  Acute Pain Crisis Treatment:    ER/Acute Care/Infusion Clinic:   o Morphine 2 mg IVP/SC Q1H X 3 doses  o Toradol x1 (avoid through early May due to being on Rivaroxaban)  o Maintenance IV fluids with LR  o Other: Benadryl PO and Zofran 8 mg IV PRN itching or nausea    Inpatient:  o Opioid: Morphine 2 mg IV Q1H PRN until PCA starts  o PCA plan:   - PCA button dose: Morphine 1 mg  - Lockout: 20 minutes  - Continuous Infusion: consider 1 mg/hr  - One hr max: 4 mg  o Other Medications: Benadryl, Zofran  o If PCA not working, " she Has had success with ketamine starting at 4mg/h and advancing only to 6mg/h, as 8mg/h made her feel quite poorly.  o ASA on hold (ok to give celebrex)  o Supportive Care: Docusate, Senna  Chronic Pain Medications:    Home regimen  oxycodone 10 mg p.o. q.6 hours p.r.n. breakthrough pain.  She also continues on Celebrex, and Zoloft among other medications.    -Also benefits from mental health visits, acupuncture  Baseline Hemoglobin: 9.5 g/dL (on chronic transfusions), 7-8 without  Hydroxyurea use: Yes  H/O blood transfusions: Yes, several (iron overload) Most recent 8/21/20    H/O Transfusion Reactions: no    Antibodies:none  H/O Acute Chest Syndrome: Yes    Last episode: 10/2019     ICU/intubation: unsure  H/O Stroke: Yes (managed with chronic transfusions in the past)  H/O VTE: no  H/O Cholecystectomy or Splenectomy: no  H/O Asthma, Pulm HTN, AVN, Leg Ulcers, Nephropathy, Retinopathy, etc: Iron overload, asthma, chronic lung disease, developmental delay from early stroke    -------------------------------------------    Sickle Cell Disease Comprehensive Checklist    Bone Health/Avascular Necrosis Screening/Symptoms (each visit): no new concerns today    Leg Ulcer evaluation (every visit): none    Hypertension (every visit): mildly hypertensive recently    Last ophthalmologic exam: 7/29/20    Last urinalysis for microalbuminuria/CKD (annually): within last year    Last pulmonary evaluation (asthma, AMAN, pulm HTN): within last year    Stroke/silent cerebral infarct Hx (Y/N): Yes TIA ~2014, first event ~age 1 with full stroke and R sided weakness    Last PCP Visit: 8/2020    Vaccines:  o PCV13: 5/13/19  o Pneumovax (PPSV23): 3/04, 10/09, 7/12/19 (next due 7/2024)  o Menactra: 4/2010, 9/2015 (MCV overdue Sept 2020)  o Influenza: got 20-21 vaccine per self report    Audiology (chelation): done 6/2020, normal    Past Medical History:  Past Medical History:   Diagnosis Date     Anemia      Anxiety      Bleeding  disorder (H)      Blood clotting disorder (H)      Cerebral infarction (H) 2015     Cognitive developmental delay     low IQ. 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 (H)     right hand contractures     Iron overload due to repeated red blood cell transfusions      Migraines      Oppositional defiant behavior      Uncomplicated asthma        Past Surgical History:  Past Surgical History:   Procedure Laterality Date     AS INSERT TUNNELED CV 2 CATH W/O PORT/PUMP       C BREAST REDUCTION (INCLUDES LIPO) TIER 3 Bilateral 04/23/2019     CHOLECYSTECTOMY       IR CVC NON TUNNEL PLACEMENT  04/07/2020     REPAIR TENDON ELBOW Right 10/02/2019    Procedure: Right Elbow Flexor Lengthening, Flexor Pronator Slide Of Wrist and Finger, Thumb Adductor Lengthening;  Surgeon: Anai Franco MD;  Location: [facility] OR     TONSILLECTOMY Bilateral 10/02/2019    Procedure: Bilateral Tonsillectomy;  Surgeon: Farhana Guy MD;  Location: [facility] OR       Family History:   Family History   Problem Relation Age of Onset     Sickle Cell Trait Mother      Hypertension Mother      Asthma Mother      Sickle Cell Trait Father        Social History:  Social History     Socioeconomic History     Marital status: Single     Spouse name: Not on file     Number of children: Not on file     Years of education: Not on file     Highest education level: Not on file   Occupational History     Not on file   Social Needs     Financial resource strain: Not on file     Food insecurity     Worry: Not on file     Inability: Not on file     Transportation needs     Medical: Not on file     Non-medical: Not on file   Tobacco Use     Smoking status: Never Smoker     Smokeless tobacco: Never Used   Substance and Sexual Activity     Alcohol use: Not Currently     Alcohol/week: 0.0 standard drinks     Drug use: Never      "Sexual activity: Not Currently     Partners: Male     Birth control/protection: Other   Lifestyle     Physical activity     Days per week: Not on file     Minutes per session: Not on file     Stress: Not on file   Relationships     Social connections     Talks on phone: Not on file     Gets together: Not on file     Attends Buddhist service: Not on file     Active member of club or organization: Not on file     Attends meetings of clubs or organizations: Not on file     Relationship status: Not on file     Intimate partner violence     Fear of current or ex partner: Not on file     Emotionally abused: Not on file     Physically abused: Not on file     Forced sexual activity: Not on file   Other Topics Concern     Parent/sibling w/ CABG, MI or angioplasty before 65F 55M? Not Asked   Social History Narrative    Lives with mom and extended family but \"toxic environment\" per her report. She would like to move out but cannot financially do so. She has minimal support at home despite her significant SCD comorbidities and cognitive delay from stroke.       Medications:  Current Outpatient Medications   Medication     acetaminophen (TYLENOL) 325 MG tablet     albuterol (PROAIR HFA/PROVENTIL HFA/VENTOLIN HFA) 108 (90 Base) MCG/ACT inhaler     albuterol (PROVENTIL) (2.5 MG/3ML) 0.083% neb solution     aspirin (ASPIRIN) 81 MG EC tablet     budesonide-formoterol (SYMBICORT) 160-4.5 MCG/ACT Inhaler     celecoxib (CELEBREX) 100 MG capsule     diphenhydrAMINE (BENADRYL) 25 MG capsule     Hydroxyurea 1000 MG TABS     ibuprofen (ADVIL/MOTRIN) 200 MG tablet     JADENU 360 MG tablet     medroxyPROGESTERone (DEPO-PROVERA) 150 MG/ML IM injection     naloxone (NARCAN) 4 MG/0.1ML nasal spray     ondansetron (ZOFRAN) 8 MG tablet     oxyCODONE IR (ROXICODONE) 10 MG tablet     oxyCODONE IR (ROXICODONE) 10 MG tablet     rivaroxaban ANTICOAGULANT (XARELTO) 15 MG TABS tablet     [START ON 2/23/2021] rivaroxaban ANTICOAGULANT (XARELTO) 20 MG " TABS tablet     sertraline (ZOLOFT) 100 MG tablet     No current facility-administered medications for this visit.          Physical Exam:   There were no vitals taken for this visit.     GEN: young  female, feeling well today  HEENT: no icterus, MMM  NECK: no visible asymmetry  RESP: speaking in full sentences, no increased work of breathing  NEURO: alert and oriented      Labs:   Results for HIMANSHU AL (MRN [id number]) as of 2/6/2021 22:11   Ref. Range 2/3/2021 06:28   WBC Latest Ref Range: 4.0 - 11.0 10e9/L 11.7 (H)   Hemoglobin Latest Ref Range: 11.7 - 15.7 g/dL 7.8 (L)   Hematocrit Latest Ref Range: 35.0 - 47.0 % 22.5 (L)   Platelet Count Latest Ref Range: 150 - 450 10e9/L 350   RBC Count Latest Ref Range: 3.8 - 5.2 10e12/L 2.37 (L)   MCV Latest Ref Range: 78 - 100 fl 95   MCH Latest Ref Range: 26.5 - 33.0 pg 32.9   MCHC Latest Ref Range: 31.5 - 36.5 g/dL 34.7   RDW Latest Ref Range: 10.0 - 15.0 % 22.1 (H)         Imaging:   Examination: NM LUNG SCAN PERFUSION PARTICULATE       Date: 2/1/2021 1:59 AM      Indication: PE suspected, low/intermediate prob, positive D-dimer; contrast allergy.  History of Sickle Cell.     Comparison: none     Technique:     The patient received 7 mCi of Tc-99m labeled MAA intravenously. Ventilation imaging was deferred as per department policy precautions  during COVID-19 pandemic. A standard eight view lung perfusion scan was obtained. SPECT images of the lungs were obtained. CT images of  the chest were obtained for the purposes of anatomic localization and attenuation correction only.     Findings:     Planar images and Fused SPECT-CT images demonstrate mild reduced radiotracer uptake within the lateral right middle lobe and posterior  subsegmental right lower lobe. Additionally there is reduced radiotracer uptake in the posterior segment of the superior left upper  lobe. No CT abnormality at these sites. No previous history of pulmonary emboli                                                                       Impression:  At least 3 moderate subsegmental perfusion defects (right greater than left), these findings are consistent with pulmonary emboli.      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 Her disease has been complicated by stroke leading to significant cognitive delay and right sided hemiparesis, high anxiety leading to frequent pain crises on top of chronic pain syndrome, poor social structure at home with no real support, and iron overload secondary to repeated transfusions. She was recently diagnosed with bilateral PEs    Major Topics of the Visit:  1. Pain Management: We discussed our current strategy. She has weaned off Oxycontin by her own request. Given that she just got an oxycodone Rx two days ago, I preferred to change her NSAID regimen first. We are switching to naproxen 750 mg BID to see if that will help with some of the gaps between ibuprofen dosing (also doing q12h). We will continue to try and stay off Oxycontin and limit the addition of more medications but she may need some increased Oxycodone at some point.  2. Iron overload/Pharmacy Issues: She is now on chelation  3. High RBC turnover: Jennifer really has a high degree of RBC turnover, more than most patients I have seen. Oxybryta led to more frequent pain episodes (chest pain, which was new) and did not change the RBC turnover so it was stopped in December.  4. Pulmonary Emboli: continue rivaroxaban. She does report taking it with meals as appropriate. Holding aspirin for 3 months.  5. Follow up: 3 weeks with me.     I spent a total of 21 minutes face-to-face with Jennifer Cervantes during today's office visit.     Review of external notes as documented above   Review of the result(s) of each unique test - CBC, V/Q scan  Diagnosis or treatment significantly limited by social determinants of health - sickle cell disease, racial inequity, difficult social  situation at home    40 min spent on the date of the encounter in chart review, patient visit, review of tests, documentation and/or discussion with other providers about the issues documented above.       Eric Duncan MD  Hematologist  Division of Hematology, Oncology, and Transplantation  [facility]  [facility]  Pager: [phone]

## 2021-02-05 NOTE — LETTER
"    2/5/2021         RE: Jennifer Cervantes  [address]  [facility] [address]        Dear Colleague,    Thank you for referring your patient, Jennifer Cervantes, to the [facility]. Please see a copy of my visit note below.    Sickle Cell Outpatient Follow Up Visit      Date of encounter: Feb 5, 2021    Jennifer is a 20 year old who is being evaluated via a billable video visit.      How would you like to obtain your AVS? MyChart  If the video visit is dropped, the invitation should be resent by: Text to cell phone: [phone]  Will anyone else be joining your video visit? No    I have reviewed and updated the patient's allergies and medication list.    Concerns: No new concerns.   Refills: None needed.        Vitals - Patient Reported  Weight (Patient Reported): 73 kg (160 lb 15 oz)  Height (Patient Reported): 162.6 cm (5' 4\")  BMI (Based on Pt Reported Ht/Wt): 27.62  Pain Score: Severe Pain (7)  Pain Loc: Low Back    Ember Campos CMA      Video-Visit Details    Type of service:  Video Visit    Video Start Time: 2:41 PM  Video End Time: 3:02 PM  Duration: 21 minutes  Originating Location (pt. Location): Home    Distant Location (provider location):  [facility]     Platform used for Video Visit: Clarisa    ---------------------------------  Jennifer Cervantes is a 20 year old female who is being seen via video for hospital follow up.      Interval History: Jennifer has done well overall in the past year in terms of hospitalization and for most of 2020, even staying out of the ED. She felt comfortable weaning her Oxycontin off and that seems to have lead to more ED visits as was noted during last visit. She still has had a few since that visit and earlier this week, she came in with some chest pain too. She was deemed well enough to discharge home but the ED did have concern for PE and did a V/Q scan. Apparently the initial report was unremarkable but follow up re-read " "found three areas of V/Q defects, so she was called and told she had PEs and needed to return to the ED (2/1/2021). She was not super enthused about coming back to the ED, but I discussed her situation with her and wanted to ensure proper dosing and teaching was performed, so she was admitted for 2 days to initiate anticoagulation. She went home 2 days ago. She said her pain level is \"ok\" but is getting a bit frustrated about having to come to the ED more. She is wondering what more we can do. She said she is tolerating her Jadenu and rivaroxaban. She was able to report back her dosing regimen for anticoagulation. No reports of bleeding. She is appropriately holding her ASA.    History of Present Illness:  (Initial visit remarks from Dr. 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 She also has significant anxiety and depression with a strong anxiety about transferring to the adult clinic. She usually has pain crises secondary to the anxiety.      --------------------------------  Plan last reviewed with patient: 2/6/2021    Patient background: 21yo F, enjoys movies and kids though there are times where she does not really want to talk to people. Does not have a lot of social support at home.     Sickle Cell Disease History  Primary Hematologist: Perla  PCP: Raul  Genotype: SS  Acute Pain Crisis Treatment:    ER/Acute Care/Infusion Clinic:   o Morphine 2 mg IVP/SC Q1H X 3 doses  o Toradol x1 (avoid through early May due to being on Rivaroxaban)  o Maintenance IV fluids with LR  o Other: Benadryl PO and Zofran 8 mg IV PRN itching or nausea    Inpatient:  o Opioid: Morphine 2 mg IV Q1H PRN until PCA starts  o PCA plan:   - PCA button " dose: Morphine 1 mg  - Lockout: 20 minutes  - Continuous Infusion: consider 1 mg/hr  - One hr max: 4 mg  o Other Medications: Benadryl, Zofran  o If PCA not working, she Has had success with ketamine starting at 4mg/h and advancing only to 6mg/h, as 8mg/h made her feel quite poorly.  o ASA on hold (ok to give celebrex)  o Supportive Care: Docusate, Senna  Chronic Pain Medications:    Home regimen  oxycodone 10 mg p.o. q.6 hours p.r.n. breakthrough pain.  She also continues on Celebrex, and Zoloft among other medications.    -Also benefits from mental health visits, acupuncture  Baseline Hemoglobin: 9.5 g/dL (on chronic transfusions), 7-8 without  Hydroxyurea use: Yes  H/O blood transfusions: Yes, several (iron overload) Most recent 8/21/20    H/O Transfusion Reactions: no    Antibodies:none  H/O Acute Chest Syndrome: Yes    Last episode: 10/2019     ICU/intubation: unsure  H/O Stroke: Yes (managed with chronic transfusions in the past)  H/O VTE: no  H/O Cholecystectomy or Splenectomy: no  H/O Asthma, Pulm HTN, AVN, Leg Ulcers, Nephropathy, Retinopathy, etc: Iron overload, asthma, chronic lung disease, developmental delay from early stroke    -------------------------------------------    Sickle Cell Disease Comprehensive Checklist    Bone Health/Avascular Necrosis Screening/Symptoms (each visit): no new concerns today    Leg Ulcer evaluation (every visit): none    Hypertension (every visit): mildly hypertensive recently    Last ophthalmologic exam: 7/29/20    Last urinalysis for microalbuminuria/CKD (annually): within last year    Last pulmonary evaluation (asthma, AMAN, pulm HTN): within last year    Stroke/silent cerebral infarct Hx (Y/N): Yes TIA ~2014, first event ~age 1 with full stroke and R sided weakness    Last PCP Visit: 8/2020    Vaccines:  o PCV13: 5/13/19  o Pneumovax (PPSV23): 3/04, 10/09, 7/12/19 (next due 7/2024)  o Menactra: 4/2010, 9/2015 (MCV overdue Sept 2020)  o Influenza: got 20-21 vaccine per  self report    Audiology (chelation): done 6/2020, normal    Past Medical History:  Past Medical History:   Diagnosis Date     Anemia      Anxiety      Bleeding disorder (H)      Blood clotting disorder (H)      Cerebral infarction (H) 2015     Cognitive developmental delay     low IQ. 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 (H)     right hand contractures     Iron overload due to repeated red blood cell transfusions      Migraines      Oppositional defiant behavior      Uncomplicated asthma        Past Surgical History:  Past Surgical History:   Procedure Laterality Date     AS INSERT TUNNELED CV 2 CATH W/O PORT/PUMP       C BREAST REDUCTION (INCLUDES LIPO) TIER 3 Bilateral 04/23/2019     CHOLECYSTECTOMY       IR CVC NON TUNNEL PLACEMENT  04/07/2020     REPAIR TENDON ELBOW Right 10/02/2019    Procedure: Right Elbow Flexor Lengthening, Flexor Pronator Slide Of Wrist and Finger, Thumb Adductor Lengthening;  Surgeon: Anai Franco MD;  Location: [facility] OR     TONSILLECTOMY Bilateral 10/02/2019    Procedure: Bilateral Tonsillectomy;  Surgeon: Farhana Guy MD;  Location: [facility] OR       Family History:   Family History   Problem Relation Age of Onset     Sickle Cell Trait Mother      Hypertension Mother      Asthma Mother      Sickle Cell Trait Father        Social History:  Social History     Socioeconomic History     Marital status: Single     Spouse name: Not on file     Number of children: Not on file     Years of education: Not on file     Highest education level: Not on file   Occupational History     Not on file   Social Needs     Financial resource strain: Not on file     Food insecurity     Worry: Not on file     Inability: Not on file     Transportation needs     Medical: Not on file     Non-medical: Not on file   Tobacco Use     Smoking status: Never Smoker      "Smokeless tobacco: Never Used   Substance and Sexual Activity     Alcohol use: Not Currently     Alcohol/week: 0.0 standard drinks     Drug use: Never     Sexual activity: Not Currently     Partners: Male     Birth control/protection: Other   Lifestyle     Physical activity     Days per week: Not on file     Minutes per session: Not on file     Stress: Not on file   Relationships     Social connections     Talks on phone: Not on file     Gets together: Not on file     Attends Spiritism service: Not on file     Active member of club or organization: Not on file     Attends meetings of clubs or organizations: Not on file     Relationship status: Not on file     Intimate partner violence     Fear of current or ex partner: Not on file     Emotionally abused: Not on file     Physically abused: Not on file     Forced sexual activity: Not on file   Other Topics Concern     Parent/sibling w/ CABG, MI or angioplasty before 65F 55M? Not Asked   Social History Narrative    Lives with mom and extended family but \"toxic environment\" per her report. She would like to move out but cannot financially do so. She has minimal support at home despite her significant SCD comorbidities and cognitive delay from stroke.       Medications:  Current Outpatient Medications   Medication     acetaminophen (TYLENOL) 325 MG tablet     albuterol (PROAIR HFA/PROVENTIL HFA/VENTOLIN HFA) 108 (90 Base) MCG/ACT inhaler     albuterol (PROVENTIL) (2.5 MG/3ML) 0.083% neb solution     aspirin (ASPIRIN) 81 MG EC tablet     budesonide-formoterol (SYMBICORT) 160-4.5 MCG/ACT Inhaler     celecoxib (CELEBREX) 100 MG capsule     diphenhydrAMINE (BENADRYL) 25 MG capsule     Hydroxyurea 1000 MG TABS     ibuprofen (ADVIL/MOTRIN) 200 MG tablet     JADENU 360 MG tablet     medroxyPROGESTERone (DEPO-PROVERA) 150 MG/ML IM injection     naloxone (NARCAN) 4 MG/0.1ML nasal spray     ondansetron (ZOFRAN) 8 MG tablet     oxyCODONE IR (ROXICODONE) 10 MG tablet     oxyCODONE " IR (ROXICODONE) 10 MG tablet     rivaroxaban ANTICOAGULANT (XARELTO) 15 MG TABS tablet     [START ON 2/23/2021] rivaroxaban ANTICOAGULANT (XARELTO) 20 MG TABS tablet     sertraline (ZOLOFT) 100 MG tablet     No current facility-administered medications for this visit.          Physical Exam:   There were no vitals taken for this visit.     GEN: young  female, feeling well today  HEENT: no icterus, MMM  NECK: no visible asymmetry  RESP: speaking in full sentences, no increased work of breathing  NEURO: alert and oriented      Labs:   Results for HIMANSHU AL (MRN [id number]) as of 2/6/2021 22:11   Ref. Range 2/3/2021 06:28   WBC Latest Ref Range: 4.0 - 11.0 10e9/L 11.7 (H)   Hemoglobin Latest Ref Range: 11.7 - 15.7 g/dL 7.8 (L)   Hematocrit Latest Ref Range: 35.0 - 47.0 % 22.5 (L)   Platelet Count Latest Ref Range: 150 - 450 10e9/L 350   RBC Count Latest Ref Range: 3.8 - 5.2 10e12/L 2.37 (L)   MCV Latest Ref Range: 78 - 100 fl 95   MCH Latest Ref Range: 26.5 - 33.0 pg 32.9   MCHC Latest Ref Range: 31.5 - 36.5 g/dL 34.7   RDW Latest Ref Range: 10.0 - 15.0 % 22.1 (H)         Imaging:   Examination: NM LUNG SCAN PERFUSION PARTICULATE       Date: 2/1/2021 1:59 AM      Indication: PE suspected, low/intermediate prob, positive D-dimer; contrast allergy.  History of Sickle Cell.     Comparison: none     Technique:     The patient received 7 mCi of Tc-99m labeled MAA intravenously. Ventilation imaging was deferred as per department policy precautions  during COVID-19 pandemic. A standard eight view lung perfusion scan was obtained. SPECT images of the lungs were obtained. CT images of  the chest were obtained for the purposes of anatomic localization and attenuation correction only.     Findings:     Planar images and Fused SPECT-CT images demonstrate mild reduced radiotracer uptake within the lateral right middle lobe and posterior  subsegmental right lower lobe. Additionally there is reduced radiotracer  uptake in the posterior segment of the superior left upper  lobe. No CT abnormality at these sites. No previous history of pulmonary emboli                                                                      Impression:  At least 3 moderate subsegmental perfusion defects (right greater than left), these findings are consistent with pulmonary emboli.      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 Her disease has been complicated by stroke leading to significant cognitive delay and right sided hemiparesis, high anxiety leading to frequent pain crises on top of chronic pain syndrome, poor social structure at home with no real support, and iron overload secondary to repeated transfusions. She was recently diagnosed with bilateral PEs    Major Topics of the Visit:  1. Pain Management: We discussed our current strategy. She has weaned off Oxycontin by her own request. Given that she just got an oxycodone Rx two days ago, I preferred to change her NSAID regimen first. We are switching to naproxen 750 mg BID to see if that will help with some of the gaps between ibuprofen dosing (also doing q12h). We will continue to try and stay off Oxycontin and limit the addition of more medications but she may need some increased Oxycodone at some point.  2. Iron overload/Pharmacy Issues: She is now on chelation  3. High RBC turnover: Jennifer really has a high degree of RBC turnover, more than most patients I have seen. Oxybryta led to more frequent pain episodes (chest pain, which was new) and did not change the RBC turnover so it was stopped in December.  4. Pulmonary Emboli: continue rivaroxaban. She does report taking it with meals as appropriate. Holding aspirin for 3 months.  5. Follow up: 3 weeks with me.     I spent a total of 21 minutes face-to-face with Jennifer Cervantes during today's office visit.     Review of external notes as documented above   Review of the result(s) of each unique test -  CBC, V/Q scan  Diagnosis or treatment significantly limited by social determinants of health - sickle cell disease, racial inequity, difficult social situation at home    40 min spent on the date of the encounter in chart review, patient visit, review of tests, documentation and/or discussion with other providers about the issues documented above.       Eric Duncan MD  Hematologist  Division of Hematology, Oncology, and Transplantation  [facility]  [facility]  Pager: [phone]                Again, thank you for allowing me to participate in the care of your patient.        Sincerely,        Eric Duncan MD

## 2021-02-05 NOTE — TELEPHONE ENCOUNTER
Spoke with patient about switching her upcoming appt with Dr. Gasca on 2/17 from in-person to virtual due to changes in clinic protocol and as some staff are having to cover the ICU and COVID units. Pt was agreeable and states she is able to do a video visit if the link is sent to her via text. Details confirmed with pt and note was created to text the link to the confirmed phone number.

## 2021-02-05 NOTE — LETTER
"    2/5/2021         RE: Jennifer Cervantes  [address]  [facility] [address]        Dear Colleague,    Thank you for referring your patient, Jennifer Cervantes, to the [facility]. Please see a copy of my visit note below.    Jennifer is a 20 year old who is being evaluated via a billable video visit.      How would you like to obtain your AVS? MyChart  If the video visit is dropped, the invitation should be resent by: Text to cell phone: [phone]  Will anyone else be joining your video visit? No  {If patient encounters technical issues they should call [phone] :[id number]}    I have reviewed and updated the patient's allergies and medication list.    Concerns: No new concerns.   Refills: None needed.        Vitals - Patient Reported  Weight (Patient Reported): 73 kg (160 lb 15 oz)  Height (Patient Reported): 162.6 cm (5' 4\")  BMI (Based on Pt Reported Ht/Wt): 27.62  Pain Score: Severe Pain (7)  Pain Loc: Low Back    Ember Campos CMA      Video Start Time: {video visit start/end time for provider to select:727639}  Video-Visit Details    Type of service:  Video Visit    Video End Time:{video visit start/end time for provider to select:254286}    Originating Location (pt. Location): {video visit patient location:663898::\"Home\"}    Distant Location (provider location):  [facility]     Platform used for Video Visit: {Virtual Visit Platforms:664231::\"[facility]\"}    Sickle Cell Outpatient Follow Up Visit      Date of encounter: Feb 5, 2021    Jennifer Cervantes is a 20 year old female who is being seen in clinic visit.      Interval History: Jennifer has been able to stay out of the hospital for several months, though she has been to the ED 3-4 times this week. She said there have been some stressors at home, though details were not provided. She did say her mom was really supportive of the fact that she has been able to avoid admissions. This week, Jennifer said she has " been treated overall very well in the ED. There is a nurse (Arely PIERCE, per Jennifer's recall) who has really taken Jennifer under her care as much as possible when she comes in, which Jennifer really appreciates. She did say that she had a less than optimal experience the night before this visit, because this time she did feel more ignored. She said that she has some urinary incontinence after her strokes and no one would answer her or come help for 20-30 minutes. She did ultimately get cleaned up but she was disappointed that no one came to help, even though she tends to come in the early morning hours to avoid long waits. Otherwise, Jennifer has not had any cough, fever, chest pain, dyspnea, or GI symptoms. She is taking her Jadenu.    History of Present Illness:  (Initial visit remarks from Dr. 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 She also has significant anxiety and depression with a strong anxiety about transferring to the adult clinic. She usually has pain crises secondary to the anxiety.      --------------------------------  Plan last reviewed with patient: 1/16/2021    Patient background: 21yo F, enjoys movies and kids though there are times where she does not really want to talk to people. Does not have a lot of social support at home.     Sickle Cell Disease History  Primary Hematologist: Perla  PCP: Raul  Genotype: SS  Acute Pain Crisis Treatment:    ER/Acute Care/Infusion Clinic:   o Morphine 2 mg IVP/SC Q1H X 3 doses  o Toradol x1  o Maintenance IV fluids with LR  o Other: Benadryl PO and Zofran 8 mg IV PRN itching or nausea    Inpatient:  o Opioid: Morphine 2 mg IV Q1H PRN until PCA starts  o PCA plan:   - PCA button  dose: Morphine 1 mg  - Lockout: 20 minutes  - Continuous Infusion: consider 1 mg/hr  - One hr max: 4 mg  o Other Medications: Benadryl, Zofran  o If PCA not working, she Has had success with ketamine starting at 4mg/h and advancing only to 6mg/h, as 8mg/h made her feel quite poorly.  o Continue ASA (ok to give celebrex)--discuss Toradol with hematology first  o Supportive Care: Docusate, Senna  Chronic Pain Medications:    Home regimen  oxycodone 10 mg p.o. q.6 hours p.r.n. breakthrough pain.  She also continues on Celebrex, and Zoloft among other medications.    -Also benefits from mental health visits, acupuncture  Baseline Hemoglobin: 9.5 g/dL (on chronic transfusions), 7-8 without  Hydroxyurea use: Yes  H/O blood transfusions: Yes, several (iron overload) Most recent 5/22/20    H/O Transfusion Reactions: no    Antibodies:none  H/O Acute Chest Syndrome: Yes    Last episode: 10/2019     ICU/intubation: unsure  H/O Stroke: Yes (managed with chronic transfusions in the past)  H/O VTE: no  H/O Cholecystectomy or Splenectomy: no  H/O Asthma, Pulm HTN, AVN, Leg Ulcers, Nephropathy, Retinopathy, etc: Iron overload, asthma, chronic lung disease, developmental delay from early stroke    -------------------------------------------    Sickle Cell Disease Comprehensive Checklist    Bone Health/Avascular Necrosis Screening/Symptoms (each visit): no new concerns today    Leg Ulcer evaluation (every visit): none    Hypertension (every visit): mildly hypertensive today, unclear whether it is anxiety about being in clinic or more than that    Last ophthalmologic exam: 7/29/20    Last urinalysis for microalbuminuria/CKD (annually): within last year    Last pulmonary evaluation (asthma, AMAN, pulm HTN): within last year    Stroke/silent cerebral infarct Hx (Y/N): Yes TIA ~2014, first event ~age 1 with full stroke and R sided weakness    Last PCP Visit: 8/2020    Vaccines:  o PCV13: 5/13/19  o Pneumovax (PPSV23): 3/04, 10/09, 7/12/19  (next due 7/2024)  o Menactra: 4/2010, 9/2015 (MCV due Sept 2020)  o Influenza: got 19-20 vaccine    Audiology (chelation): done 6/2020, normal    Past Medical History:  Past Medical History:   Diagnosis Date     Anemia      Anxiety      Bleeding disorder (H)      Blood clotting disorder (H)      Cerebral infarction (H) 2015     Cognitive developmental delay     low IQ. 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 (H)     right hand contractures     Iron overload due to repeated red blood cell transfusions      Migraines      Oppositional defiant behavior      Uncomplicated asthma        Past Surgical History:  Past Surgical History:   Procedure Laterality Date     AS INSERT TUNNELED CV 2 CATH W/O PORT/PUMP       C BREAST REDUCTION (INCLUDES LIPO) TIER 3 Bilateral 04/23/2019     CHOLECYSTECTOMY       IR CVC NON TUNNEL PLACEMENT  04/07/2020     REPAIR TENDON ELBOW Right 10/02/2019    Procedure: Right Elbow Flexor Lengthening, Flexor Pronator Slide Of Wrist and Finger, Thumb Adductor Lengthening;  Surgeon: Anai Franco MD;  [facility]: [facility]     TONSILLECTOMY Bilateral 10/02/2019    Procedure: Bilateral Tonsillectomy;  Surgeon: Farhana Guy MD;  Location: [facility] OR       Family History:   Family History   Problem Relation Age of Onset     Sickle Cell Trait Mother      Hypertension Mother      Asthma Mother      Sickle Cell Trait Father        Social History:  Social History     Socioeconomic History     Marital status: Single     Spouse name: Not on file     Number of children: Not on file     Years of education: Not on file     Highest education level: Not on file   Occupational History     Not on file   Social Needs     Financial resource strain: Not on file     Food insecurity     Worry: Not on file     Inability: Not on file     Transportation needs     Medical: Not  "on file     Non-medical: Not on file   Tobacco Use     Smoking status: Never Smoker     Smokeless tobacco: Never Used   Substance and Sexual Activity     Alcohol use: Not Currently     Alcohol/week: 0.0 standard drinks     Drug use: Never     Sexual activity: Not Currently     Partners: Male     Birth control/protection: Other   Lifestyle     Physical activity     Days per week: Not on file     Minutes per session: Not on file     Stress: Not on file   Relationships     Social connections     Talks on phone: Not on file     Gets together: Not on file     Attends Zoroastrian service: Not on file     Active member of club or organization: Not on file     Attends meetings of clubs or organizations: Not on file     Relationship status: Not on file     Intimate partner violence     Fear of current or ex partner: Not on file     Emotionally abused: Not on file     Physically abused: Not on file     Forced sexual activity: Not on file   Other Topics Concern     Parent/sibling w/ CABG, MI or angioplasty before 65F 55M? Not Asked   Social History Narrative    Lives with mom and extended family but \"toxic environment\" per her report. She would like to move out but cannot financially do so. She has minimal support at home despite her significant SCD comorbidities and cognitive delay from stroke.       Medications:  Current Outpatient Medications   Medication     acetaminophen (TYLENOL) 325 MG tablet     albuterol (PROAIR HFA/PROVENTIL HFA/VENTOLIN HFA) 108 (90 Base) MCG/ACT inhaler     albuterol (PROVENTIL) (2.5 MG/3ML) 0.083% neb solution     aspirin (ASPIRIN) 81 MG EC tablet     budesonide-formoterol (SYMBICORT) 160-4.5 MCG/ACT Inhaler     celecoxib (CELEBREX) 100 MG capsule     diphenhydrAMINE (BENADRYL) 25 MG capsule     Hydroxyurea 1000 MG TABS     ibuprofen (ADVIL/MOTRIN) 200 MG tablet     JADENU 360 MG tablet     medroxyPROGESTERone (DEPO-PROVERA) 150 MG/ML IM injection     naloxone (NARCAN) 4 MG/0.1ML nasal spray     " ondansetron (ZOFRAN) 8 MG tablet     oxyCODONE IR (ROXICODONE) 10 MG tablet     oxyCODONE IR (ROXICODONE) 10 MG tablet     rivaroxaban ANTICOAGULANT (XARELTO) 15 MG TABS tablet     [START ON 2/23/2021] rivaroxaban ANTICOAGULANT (XARELTO) 20 MG TABS tablet     sertraline (ZOLOFT) 100 MG tablet     No current facility-administered medications for this visit.          Physical Exam:   There were no vitals taken for this visit.     GEN: young  female, feeling well today  HEENT: full head of hair, no icterus, MMM  NECK: no visible asymmetry  RESP: speaking in full sentences, no increased work of breathing  MSK: brace on right wrist and right ankle (AFO). Contracture of right wrist  NEURO: alert and oriented  SKIN: port site c/d/i     Labs:   Results for HIMANSHU AL (MRN [id number]) as of 11/22/2020 21:56  Results for HIMANSHU AL (MRN [id number]) as of 1/18/2021 00:07   Ref. Range 1/12/2021 18:42 1/13/2021 15:44 1/13/2021 23:21 1/14/2021 21:28   Sodium Latest Ref Range: 133 - 144 mmol/L 138 139 141 139   Potassium Latest Ref Range: 3.4 - 5.3 mmol/L 3.9 3.7 3.7 4.3   Chloride Latest Ref Range: 94 - 109 mmol/L 108 109 108 111 (H)   Carbon Dioxide Latest Ref Range: 20 - 32 mmol/L 24 24 24 22   Urea Nitrogen Latest Ref Range: 7 - 30 mg/dL 19 14 11 15   Creatinine Latest Ref Range: 0.52 - 1.04 mg/dL 0.66 0.51 (L) 0.65 0.63   GFR Estimate Latest Ref Range: >60 mL/min/1.73_m2 >90 >90 >90 >90   GFR Estimate If Black Latest Ref Range: >60 mL/min/1.73_m2 >90 >90 >90 >90   Calcium Latest Ref Range: 8.5 - 10.1 mg/dL 8.9 8.7 9.0 8.8   Anion Gap Latest Ref Range: 3 - 14 mmol/L 6 6 9 6   Albumin Latest Ref Range: 3.4 - 5.0 g/dL 4.1 4.1 4.1    Protein Total Latest Ref Range: 6.8 - 8.8 g/dL 8.0 7.8 7.8    Bilirubin Total Latest Ref Range: 0.2 - 1.3 mg/dL 3.2 (H) 3.3 (H) 3.1 (H)    Alkaline Phosphatase Latest Ref Range: 40 - 150 U/L 70 68 71    ALT Latest Ref Range: 0 - 50 U/L 84 (H) 84 (H) 85 (H)    AST Latest Ref  Range: 0 - 45 U/L 104 (H) 108 (H) 110 (H)    Ferritin Latest Ref Range: 12 - 150 ng/mL  10,926 (H)     HCG Qualitative Serum Latest Ref Range: NEG^Negative    Negative    Glucose Latest Ref Range: 70 - 99 mg/dL 93 90 99 82   WBC Latest Ref Range: 4.0 - 11.0 10e9/L 12.6 (H) 10.3 12.8 (H) 13.5 (H)   Hemoglobin Latest Ref Range: 11.7 - 15.7 g/dL 8.4 (L) 8.1 (L) 7.5 (L) 7.6 (L)   Hematocrit Latest Ref Range: 35.0 - 47.0 % 23.3 (L) 22.5 (L) 20.8 (L) 20.9 (L)   Platelet Count Latest Ref Range: 150 - 450 10e9/L 288 285 317 346   RBC Count Latest Ref Range: 3.8 - 5.2 10e12/L 2.56 (L) 2.51 (L) 2.37 (L) 2.36 (L)   MCV Latest Ref Range: 78 - 100 fl 91 90 88 89   MCH Latest Ref Range: 26.5 - 33.0 pg 32.8 32.3 31.6 32.2   MCHC Latest Ref Range: 31.5 - 36.5 g/dL 36.1 36.0 36.1 36.4   RDW Latest Ref Range: 10.0 - 15.0 % 19.3 (H) 19.6 (H) 19.7 (H) 21.2 (H)   Diff Method Unknown Automated Method Automated Method Automated Method Automated Method   % Neutrophils Latest Units: % 61.3 57.9 54.4 60.0   % Lymphocytes Latest Units: % 23.0 23.2 27.0 25.0   % Monocytes Latest Units: % 9.0 9.9 9.9 7.6   % Eosinophils Latest Units: % 4.6 6.7 6.1 4.7   % Basophils Latest Units: % 1.6 1.8 2.1 2.2   % Immature Granulocytes Latest Units: % 0.5 0.5 0.5 0.5   Nucleated RBCs Latest Ref Range: 0 /100 1 (H) 1 (H) 1 (H) 2 (H)   Absolute Neutrophil Latest Ref Range: 1.6 - 8.3 10e9/L 7.7 5.9 7.0 8.1   Absolute Lymphocytes Latest Ref Range: 0.8 - 5.3 10e9/L 2.9 2.4 3.4 3.4   Absolute Monocytes Latest Ref Range: 0.0 - 1.3 10e9/L 1.1 1.0 1.3 1.0   Absolute Eosinophils Latest Ref Range: 0.0 - 0.7 10e9/L 0.6 0.7 0.8 (H) 0.6   Absolute Basophils Latest Ref Range: 0.0 - 0.2 10e9/L 0.2 0.2 0.3 (H) 0.3 (H)   Abs Immature Granulocytes Latest Ref Range: 0 - 0.4 10e9/L 0.1 0.1 0.1 0.1   Absolute Nucleated RBC Unknown 0.1 0.1 0.2 0.2   % Retic Latest Ref Range: 0.5 - 2.0 % 11.1 (H) 14.0 (H) 12.7 (H)    Absolute Retic Latest Ref Range: 25 - 95 10e9/L 284.2 (H) 353.2  (H) 300.3 (H)    Hepatitis C Antibody Latest Ref Range: NR^Nonreactive   Nonreactive         Imaging: none today    Assessment:  Jennifer Cervantes is a 20 year old female with HbSS. Her disease has been complicated by stroke leading to significant cognitive delay and right sided hemiparesis, high anxiety leading to frequent pain crises on top of chronic pain syndrome, poor social structure at home with no real support, and iron overload secondary to repeated transfusions.     Major Topics of the Visit:  1. Pain Management: We discussed our current strategy. She has weaned off oxycontin by her own request. She has had more pain this week but denies that it is due to oxycontin and said there were more stressorts at home. Otherwise, she is back to her monthly refills for the most part.  2. Iron Overload/Pharmacy Issues: She is now on chelation  3. High RBC turnover: Jennifer really has a high degree of RBC turnover, more than most patients I have seen. Oxybryta led to more frequent pain episodes (chest pain, which was new) and did not change the RBC turnover so it was stopped in Decemberl.  4. Refills: None today but will probably need oxycodone soon.  5. Follow up: 4 weeks with ANDREAS.     I spent a total of 26 minutes face-to-face with Jennifer Cervantes during today's office visit. Over 50% of the time was spent discussing how we may work to improve the pharmacy issues and counseling on the pros and cons to our current pain management strategies so she could understand. See note for details.    Eric Duncan MD  Hematologist  Division of Hematology, Oncology, and Transplantation  [facility]  [facility]  Pager: [phone]              Sickle Cell Outpatient Follow Up Visit      Date of encounter: Feb 5, 2021    Jennifer is a 20 year old who is being evaluated via a billable video visit.      How would you like to obtain your AVS? MyChart  If the video visit is dropped, the invitation should  "be resent by: Text to cell phone: [phone]  Will anyone else be joining your video visit? No    I have reviewed and updated the patient's allergies and medication list.    Concerns: No new concerns.   Refills: None needed.        Vitals - Patient Reported  Weight (Patient Reported): 73 kg (160 lb 15 oz)  Height (Patient Reported): 162.6 cm (5' 4\")  BMI (Based on Pt Reported Ht/Wt): 27.62  Pain Score: Severe Pain (7)  Pain Loc: Low Back    Ember Campos CMA      Video-Visit Details    Type of service:  Video Visit    Video Start Time: 2:41 PM  Video End Time: 3:02 PM  Duration: 21 minutes  Originating Location (pt. Location): Home    Distant Location (provider location):  [facility]     Platform used for Video Visit: [email or web address]    ---------------------------------  Jennifer Cervantes is a 20 year old female who is being seen via video for hospital follow up.      Interval History: Jennifer has done well overall in the past year in terms of hospitalization and for most of 2020, even staying out of the ED. She felt comfortable weaning her Oxycontin off and that seems to have lead to more ED visits as was noted during last visit. She still has had a few since that visit and earlier this week, she came in with some chest pain too. She was deemed well enough to discharge home but the ED did have concern for PE and did a V/Q scan. Apparently the initial report was unremarkable but follow up re-read found three areas of V/Q defects, so she was called and told she had PEs and needed to return to the ED (2/1/2021). She was not super enthused about coming back to the ED, but I discussed her situation with her and wanted to ensure proper dosing and teaching was performed, so she was admitted for 2 days to initiate anticoagulation. She went home 2 days ago. She said her pain level is \"ok\" but is getting a bit frustrated about having to come to the ED more. She is wondering what more we can do. She said she " is tolerating her Jadenu and rivaroxaban. She was able to report back her dosing regimen for anticoagulation. No reports of bleeding. She is appropriately holding her ASA.    History of Present Illness:  (Initial visit remarks from Dr. 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 She also has significant anxiety and depression with a strong anxiety about transferring to the adult clinic. She usually has pain crises secondary to the anxiety.      --------------------------------  Plan last reviewed with patient: 2/6/2021    Patient background: 19yo F, enjoys movies and kids though there are times where she does not really want to talk to people. Does not have a lot of social support at home.     Sickle Cell Disease History  Primary Hematologist: Perla  PCP: Raul  Genotype: SS  Acute Pain Crisis Treatment:    ER/Acute Care/Infusion Clinic:   o Morphine 2 mg IVP/SC Q1H X 3 doses  o Toradol x1 (avoid through early May due to being on Rivaroxaban)  o Maintenance IV fluids with LR  o Other: Benadryl PO and Zofran 8 mg IV PRN itching or nausea    Inpatient:  o Opioid: Morphine 2 mg IV Q1H PRN until PCA starts  o PCA plan:   - PCA button dose: Morphine 1 mg  - Lockout: 20 minutes  - Continuous Infusion: consider 1 mg/hr  - One hr max: 4 mg  o Other Medications: Benadryl, Zofran  o If PCA not working, she Has had success with ketamine starting at 4mg/h and advancing only to 6mg/h, as 8mg/h made her feel quite poorly.  o ASA on hold (ok to give celebrex)  o Supportive Care: Docusate, Senna  Chronic Pain Medications:    Home regimen  oxycodone 10 mg p.o. q.6 hours p.r.n. breakthrough pain.  She also continues on Celebrex, and Zoloft among  other medications.    -Also benefits from mental health visits, acupuncture  Baseline Hemoglobin: 9.5 g/dL (on chronic transfusions), 7-8 without  Hydroxyurea use: Yes  H/O blood transfusions: Yes, several (iron overload) Most recent 8/21/20    H/O Transfusion Reactions: no    Antibodies:none  H/O Acute Chest Syndrome: Yes    Last episode: 10/2019     ICU/intubation: unsure  H/O Stroke: Yes (managed with chronic transfusions in the past)  H/O VTE: no  H/O Cholecystectomy or Splenectomy: no  H/O Asthma, Pulm HTN, AVN, Leg Ulcers, Nephropathy, Retinopathy, etc: Iron overload, asthma, chronic lung disease, developmental delay from early stroke    -------------------------------------------    Sickle Cell Disease Comprehensive Checklist    Bone Health/Avascular Necrosis Screening/Symptoms (each visit): no new concerns today    Leg Ulcer evaluation (every visit): none    Hypertension (every visit): mildly hypertensive recently    Last ophthalmologic exam: 7/29/20    Last urinalysis for microalbuminuria/CKD (annually): within last year    Last pulmonary evaluation (asthma, AMAN, pulm HTN): within last year    Stroke/silent cerebral infarct Hx (Y/N): Yes TIA ~2014, first event ~age 1 with full stroke and R sided weakness    Last PCP Visit: 8/2020    Vaccines:  o PCV13: 5/13/19  o Pneumovax (PPSV23): 3/04, 10/09, 7/12/19 (next due 7/2024)  o Menactra: 4/2010, 9/2015 (MCV overdue Sept 2020)  o Influenza: got 20-21 vaccine per self report    Audiology (chelation): done 6/2020, normal    Past Medical History:  Past Medical History:   Diagnosis Date     Anemia      Anxiety      Bleeding disorder (H)      Blood clotting disorder (H)      Cerebral infarction (H) 2015     Cognitive developmental delay     low IQ. 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 (H)     right hand contractures     Iron  overload due to repeated red blood cell transfusions      Migraines      Oppositional defiant behavior      Uncomplicated asthma        Past Surgical History:  Past Surgical History:   Procedure Laterality Date     AS INSERT TUNNELED CV 2 CATH W/O PORT/PUMP       C BREAST REDUCTION (INCLUDES LIPO) TIER 3 Bilateral 04/23/2019     CHOLECYSTECTOMY       IR CVC NON TUNNEL PLACEMENT  04/07/2020     REPAIR TENDON ELBOW Right 10/02/2019    Procedure: Right Elbow Flexor Lengthening, Flexor Pronator Slide Of Wrist and Finger, Thumb Adductor Lengthening;  Surgeon: Anai Franco MD;  [facility]     TONSILLECTOMY Bilateral 10/02/2019    Procedure: Bilateral Tonsillectomy;  Surgeon: Farhana Guy MD;  Location: [facility] OR       Family History:   Family History   Problem Relation Age of Onset     Sickle Cell Trait Mother      Hypertension Mother      Asthma Mother      Sickle Cell Trait Father        Social History:  Social History     Socioeconomic History     Marital status: Single     Spouse name: Not on file     Number of children: Not on file     Years of education: Not on file     Highest education level: Not on file   Occupational History     Not on file   Social Needs     Financial resource strain: Not on file     Food insecurity     Worry: Not on file     Inability: Not on file     Transportation needs     Medical: Not on file     Non-medical: Not on file   Tobacco Use     Smoking status: Never Smoker     Smokeless tobacco: Never Used   Substance and Sexual Activity     Alcohol use: Not Currently     Alcohol/week: 0.0 standard drinks     Drug use: Never     Sexual activity: Not Currently     Partners: Male     Birth control/protection: Other   Lifestyle     Physical activity     Days per week: Not on file     Minutes per session: Not on file     Stress: Not on file   Relationships     Social connections     Talks on phone: Not on file     Gets together: Not on file     Attends [employer] service:  "Not on file     Active member of club or organization: Not on file     Attends meetings of clubs or organizations: Not on file     Relationship status: Not on file     Intimate partner violence     Fear of current or ex partner: Not on file     Emotionally abused: Not on file     Physically abused: Not on file     Forced sexual activity: Not on file   Other Topics Concern     Parent/sibling w/ CABG, MI or angioplasty before 65F 55M? Not Asked   Social History Narrative    Lives with mom and extended family but \"toxic environment\" per her report. She would like to move out but cannot financially do so. She has minimal support at home despite her significant SCD comorbidities and cognitive delay from stroke.       Medications:  Current Outpatient Medications   Medication     acetaminophen (TYLENOL) 325 MG tablet     albuterol (PROAIR HFA/PROVENTIL HFA/VENTOLIN HFA) 108 (90 Base) MCG/ACT inhaler     albuterol (PROVENTIL) (2.5 MG/3ML) 0.083% neb solution     aspirin (ASPIRIN) 81 MG EC tablet     budesonide-formoterol (SYMBICORT) 160-4.5 MCG/ACT Inhaler     celecoxib (CELEBREX) 100 MG capsule     diphenhydrAMINE (BENADRYL) 25 MG capsule     Hydroxyurea 1000 MG TABS     ibuprofen (ADVIL/MOTRIN) 200 MG tablet     JADENU 360 MG tablet     medroxyPROGESTERone (DEPO-PROVERA) 150 MG/ML IM injection     naloxone (NARCAN) 4 MG/0.1ML nasal spray     ondansetron (ZOFRAN) 8 MG tablet     oxyCODONE IR (ROXICODONE) 10 MG tablet     oxyCODONE IR (ROXICODONE) 10 MG tablet     rivaroxaban ANTICOAGULANT (XARELTO) 15 MG TABS tablet     [START ON 2/23/2021] rivaroxaban ANTICOAGULANT (XARELTO) 20 MG TABS tablet     sertraline (ZOLOFT) 100 MG tablet     No current facility-administered medications for this visit.          Physical Exam:   There were no vitals taken for this visit.     GEN: young  female, feeling well today  HEENT: no icterus, MMM  NECK: no visible asymmetry  RESP: speaking in full sentences, no increased work " of breathing  NEURO: alert and oriented      Labs:   Results for HIMANSHU AL (MRN [id number]) as of 2/6/2021 22:11   Ref. Range 2/3/2021 06:28   WBC Latest Ref Range: 4.0 - 11.0 10e9/L 11.7 (H)   Hemoglobin Latest Ref Range: 11.7 - 15.7 g/dL 7.8 (L)   Hematocrit Latest Ref Range: 35.0 - 47.0 % 22.5 (L)   Platelet Count Latest Ref Range: 150 - 450 10e9/L 350   RBC Count Latest Ref Range: 3.8 - 5.2 10e12/L 2.37 (L)   MCV Latest Ref Range: 78 - 100 fl 95   MCH Latest Ref Range: 26.5 - 33.0 pg 32.9   MCHC Latest Ref Range: 31.5 - 36.5 g/dL 34.7   RDW Latest Ref Range: 10.0 - 15.0 % 22.1 (H)         Imaging:   Examination: NM LUNG SCAN PERFUSION PARTICULATE       Date: 2/1/2021 1:59 AM      Indication: PE suspected, low/intermediate prob, positive D-dimer; contrast allergy.  History of Sickle Cell.     Comparison: none     Technique:     The patient received 7 mCi of Tc-99m labeled MAA intravenously. Ventilation imaging was deferred as per department policy precautions  during COVID-19 pandemic. A standard eight view lung perfusion scan was obtained. SPECT images of the lungs were obtained. CT images of  the chest were obtained for the purposes of anatomic localization and attenuation correction only.     Findings:     Planar images and Fused SPECT-CT images demonstrate mild reduced radiotracer uptake within the lateral right middle lobe and posterior  subsegmental right lower lobe. Additionally there is reduced radiotracer uptake in the posterior segment of the superior left upper  lobe. No CT abnormality at these sites. No previous history of pulmonary emboli                                                                      Impression:  At least 3 moderate subsegmental perfusion defects (right greater than left), these findings are consistent with pulmonary emboli.      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 Her disease has been complicated by stroke leading to significant  cognitive delay and right sided hemiparesis, high anxiety leading to frequent pain crises on top of chronic pain syndrome, poor social structure at home with no real support, and iron overload secondary to repeated transfusions. She was recently diagnosed with bilateral PEs    Major Topics of the Visit:  1. Pain Management: We discussed our current strategy. She has weaned off Oxycontin by her own request. Given that she just got an oxycodone Rx two days ago, I preferred to change her NSAID regimen first. We are switching to naproxen 750 mg BID to see if that will help with some of the gaps between ibuprofen dosing (also doing q12h). We will continue to try and stay off Oxycontin and limit the addition of more medications but she may need some increased Oxycodone at some point.  2. Iron overload/Pharmacy Issues: She is now on chelation  3. High RBC turnover: Jennifer really has a high degree of RBC turnover, more than most patients I have seen. Oxybryta led to more frequent pain episodes (chest pain, which was new) and did not change the RBC turnover so it was stopped in Decemberl.  4. Pulmonary Emboli: continue rivaroxaban. She does report taking it with meals as appropriate.  5. Follow up: 3 weeks with me.     I spent a total of 21 minutes face-to-face with Jennifer Cervantes during today's office visit.     Review of external notes as documented above   Review of the result(s) of each unique test - CBC, V/Q scan  Diagnosis or treatment significantly limited by social determinants of health - sickle cell disease, racial inequity, difficult social situation at home    40 min spent on the date of the encounter in chart review, patient visit, review of tests, documentation and/or discussion with other providers about the issues documented above.       Eric Duncan MD  Hematologist  Division of Hematology, Oncology, and Transplantation  [facility]  [facility] [place]  Pager: [phone]                Again, thank you for allowing me to participate in the care of your patient.        Sincerely,        Eric Duncan MD

## 2021-02-05 NOTE — PROGRESS NOTES
"Sickle Cell Outpatient Follow Up Visit      Date of encounter: Feb 5, 2021    Jennifer is a 20 year old who is being evaluated via a billable video visit.      How would you like to obtain your AVS? MyChart  If the video visit is dropped, the invitation should be resent by: Text to cell phone: [phone]  Will anyone else be joining your video visit? No    I have reviewed and updated the patient's allergies and medication list.    Concerns: No new concerns.   Refills: None needed.        Vitals - Patient Reported  Weight (Patient Reported): 73 kg (160 lb 15 oz)  Height (Patient Reported): 162.6 cm (5' 4\")  BMI (Based on Pt Reported Ht/Wt): 27.62  Pain Score: Severe Pain (7)  Pain Loc: Low Back    Ember Campos CMA      Video-Visit Details    Type of service:  Video Visit    Video Start Time: 2:41 PM  Video End Time: 3:02 PM  Duration: 21 minutes  Originating Location (pt. Location): Home    Distant Location (provider location):  [facility]     Platform used for Video Visit: Endoclear    ---------------------------------  Jennifer Cervantes is a 20 year old female who is being seen via video for hospital follow up.      Interval History: Jennifer has done well overall in the past year in terms of hospitalization and for most of 2020, even staying out of the ED. She felt comfortable weaning her Oxycontin off and that seems to have lead to more ED visits as was noted during last visit. She still has had a few since that visit and earlier this week, she came in with some chest pain too. She was deemed well enough to discharge home but the ED did have concern for PE and did a V/Q scan. Apparently the initial report was unremarkable but follow up re-read found three areas of V/Q defects, so she was called and told she had PEs and needed to return to the ED (2/1/2021). She was not super enthused about coming back to the ED, but I discussed her situation with her and wanted to ensure proper dosing and " "teaching was performed, so she was admitted for 2 days to initiate anticoagulation. She went home 2 days ago. She said her pain level is \"ok\" but is getting a bit frustrated about having to come to the ED more. She is wondering what more we can do. She said she is tolerating her Jadenu and rivaroxaban. She was able to report back her dosing regimen for anticoagulation. No reports of bleeding. She is appropriately holding her ASA.    History of Present Illness:  (Initial visit remarks from Dr. 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 She also has significant anxiety and depression with a strong anxiety about transferring to the adult clinic. She usually has pain crises secondary to the anxiety.      --------------------------------  Plan last reviewed with patient: 2/6/2021    Patient background: 19yo F, enjoys movies and kids though there are times where she does not really want to talk to people. Does not have a lot of social support at home.     Sickle Cell Disease History  Primary Hematologist: Perla  PCP: Raul  Genotype: SS  Acute Pain Crisis Treatment:    ER/Acute Care/Infusion Clinic:   o Morphine 2 mg IVP/SC Q1H X 3 doses  o Toradol x1 (avoid through early May due to being on Rivaroxaban)  o Maintenance IV fluids with LR  o Other: Benadryl PO and Zofran 8 mg IV PRN itching or nausea    Inpatient:  o Opioid: Morphine 2 mg IV Q1H PRN until PCA starts  o PCA plan:   - PCA button dose: Morphine 1 mg  - Lockout: 20 minutes  - Continuous Infusion: consider 1 mg/hr  - One hr max: 4 mg  o Other Medications: Benadryl, Zofran  o If PCA not working, she Has had success with ketamine starting at 4mg/h and advancing only to 6mg/h, as " 8mg/h made her feel quite poorly.  o ASA on hold (ok to give celebrex)  o Supportive Care: Docusate, Senna  Chronic Pain Medications:    Home regimen  oxycodone 10 mg p.o. q.6 hours p.r.n. breakthrough pain.  She also continues on Celebrex, and Zoloft among other medications.    -Also benefits from mental health visits, acupuncture  Baseline Hemoglobin: 9.5 g/dL (on chronic transfusions), 7-8 without  Hydroxyurea use: Yes  H/O blood transfusions: Yes, several (iron overload) Most recent 8/21/20    H/O Transfusion Reactions: no    Antibodies:none  H/O Acute Chest Syndrome: Yes    Last episode: 10/2019     ICU/intubation: unsure  H/O Stroke: Yes (managed with chronic transfusions in the past)  H/O VTE: no  H/O Cholecystectomy or Splenectomy: no  H/O Asthma, Pulm HTN, AVN, Leg Ulcers, Nephropathy, Retinopathy, etc: Iron overload, asthma, chronic lung disease, developmental delay from early stroke    -------------------------------------------    Sickle Cell Disease Comprehensive Checklist    Bone Health/Avascular Necrosis Screening/Symptoms (each visit): no new concerns today    Leg Ulcer evaluation (every visit): none    Hypertension (every visit): mildly hypertensive recently    Last ophthalmologic exam: 7/29/20    Last urinalysis for microalbuminuria/CKD (annually): within last year    Last pulmonary evaluation (asthma, AMAN, pulm HTN): within last year    Stroke/silent cerebral infarct Hx (Y/N): Yes TIA ~2014, first event ~age 1 with full stroke and R sided weakness    Last PCP Visit: 8/2020    Vaccines:  o PCV13: 5/13/19  o Pneumovax (PPSV23): 3/04, 10/09, 7/12/19 (next due 7/2024)  o Menactra: 4/2010, 9/2015 (MCV overdue Sept 2020)  o Influenza: got 20-21 vaccine per self report    Audiology (chelation): done 6/2020, normal    Past Medical History:  Past Medical History:   Diagnosis Date     Anemia      Anxiety      Bleeding disorder (H)      Blood clotting disorder (H)      Cerebral infarction (H) 2015      Cognitive developmental delay     low IQ. 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 (H)     right hand contractures     Iron overload due to repeated red blood cell transfusions      Migraines      Oppositional defiant behavior      Uncomplicated asthma        Past Surgical History:  Past Surgical History:   Procedure Laterality Date     AS INSERT TUNNELED CV 2 CATH W/O PORT/PUMP       C BREAST REDUCTION (INCLUDES LIPO) TIER 3 Bilateral 04/23/2019     CHOLECYSTECTOMY       IR CVC NON TUNNEL PLACEMENT  04/07/2020     REPAIR TENDON ELBOW Right 10/02/2019    Procedure: Right Elbow Flexor Lengthening, Flexor Pronator Slide Of Wrist and Finger, Thumb Adductor Lengthening;  Surgeon: Anai Franco MD;  Location: [facility] OR     TONSILLECTOMY Bilateral 10/02/2019    Procedure: Bilateral Tonsillectomy;  Surgeon: Farhana Guy MD;  Location: [facility] OR       Family History:   Family History   Problem Relation Age of Onset     Sickle Cell Trait Mother      Hypertension Mother      Asthma Mother      Sickle Cell Trait Father        Social History:  Social History     Socioeconomic History     Marital status: Single     Spouse name: Not on file     Number of children: Not on file     Years of education: Not on file     Highest education level: Not on file   Occupational History     Not on file   Social Needs     Financial resource strain: Not on file     Food insecurity     Worry: Not on file     Inability: Not on file     Transportation needs     Medical: Not on file     Non-medical: Not on file   Tobacco Use     Smoking status: Never Smoker     Smokeless tobacco: Never Used   Substance and Sexual Activity     Alcohol use: Not Currently     Alcohol/week: 0.0 standard drinks     Drug use: Never     Sexual activity: Not Currently     Partners: Male     Birth control/protection: Other  "  Lifestyle     Physical activity     Days per week: Not on file     Minutes per session: Not on file     Stress: Not on file   Relationships     Social connections     Talks on phone: Not on file     Gets together: Not on file     Attends Voodoo service: Not on file     Active member of club or organization: Not on file     Attends meetings of clubs or organizations: Not on file     Relationship status: Not on file     Intimate partner violence     Fear of current or ex partner: Not on file     Emotionally abused: Not on file     Physically abused: Not on file     Forced sexual activity: Not on file   Other Topics Concern     Parent/sibling w/ CABG, MI or angioplasty before 65F 55M? Not Asked   Social History Narrative    Lives with mom and extended family but \"toxic environment\" per her report. She would like to move out but cannot financially do so. She has minimal support at home despite her significant SCD comorbidities and cognitive delay from stroke.       Medications:  Current Outpatient Medications   Medication     acetaminophen (TYLENOL) 325 MG tablet     albuterol (PROAIR HFA/PROVENTIL HFA/VENTOLIN HFA) 108 (90 Base) MCG/ACT inhaler     albuterol (PROVENTIL) (2.5 MG/3ML) 0.083% neb solution     aspirin (ASPIRIN) 81 MG EC tablet     budesonide-formoterol (SYMBICORT) 160-4.5 MCG/ACT Inhaler     celecoxib (CELEBREX) 100 MG capsule     diphenhydrAMINE (BENADRYL) 25 MG capsule     Hydroxyurea 1000 MG TABS     ibuprofen (ADVIL/MOTRIN) 200 MG tablet     JADENU 360 MG tablet     medroxyPROGESTERone (DEPO-PROVERA) 150 MG/ML IM injection     naloxone (NARCAN) 4 MG/0.1ML nasal spray     ondansetron (ZOFRAN) 8 MG tablet     oxyCODONE IR (ROXICODONE) 10 MG tablet     oxyCODONE IR (ROXICODONE) 10 MG tablet     rivaroxaban ANTICOAGULANT (XARELTO) 15 MG TABS tablet     [START ON 2/23/2021] rivaroxaban ANTICOAGULANT (XARELTO) 20 MG TABS tablet     sertraline (ZOLOFT) 100 MG tablet     No current facility-administered " medications for this visit.          Physical Exam:   There were no vitals taken for this visit.     GEN: young  female, feeling well today  HEENT: no icterus, MMM  NECK: no visible asymmetry  RESP: speaking in full sentences, no increased work of breathing  NEURO: alert and oriented      Labs:   Results for HIMANSHU AL (MRN [id number]) as of 2/6/2021 22:11   Ref. Range 2/3/2021 06:28   WBC Latest Ref Range: 4.0 - 11.0 10e9/L 11.7 (H)   Hemoglobin Latest Ref Range: 11.7 - 15.7 g/dL 7.8 (L)   Hematocrit Latest Ref Range: 35.0 - 47.0 % 22.5 (L)   Platelet Count Latest Ref Range: 150 - 450 10e9/L 350   RBC Count Latest Ref Range: 3.8 - 5.2 10e12/L 2.37 (L)   MCV Latest Ref Range: 78 - 100 fl 95   MCH Latest Ref Range: 26.5 - 33.0 pg 32.9   MCHC Latest Ref Range: 31.5 - 36.5 g/dL 34.7   RDW Latest Ref Range: 10.0 - 15.0 % 22.1 (H)         Imaging:   Examination: NM LUNG SCAN PERFUSION PARTICULATE       Date: 2/1/2021 1:59 AM      Indication: PE suspected, low/intermediate prob, positive D-dimer; contrast allergy.  History of Sickle Cell.     Comparison: none     Technique:     The patient received 7 mCi of Tc-99m labeled MAA intravenously. Ventilation imaging was deferred as per department policy precautions  during COVID-19 pandemic. A standard eight view lung perfusion scan was obtained. SPECT images of the lungs were obtained. CT images of  the chest were obtained for the purposes of anatomic localization and attenuation correction only.     Findings:     Planar images and Fused SPECT-CT images demonstrate mild reduced radiotracer uptake within the lateral right middle lobe and posterior  subsegmental right lower lobe. Additionally there is reduced radiotracer uptake in the posterior segment of the superior left upper  lobe. No CT abnormality at these sites. No previous history of pulmonary emboli                                                                      Impression:  At least 3 moderate  subsegmental perfusion defects (right greater than left), these findings are consistent with pulmonary emboli.      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 Her disease has been complicated by stroke leading to significant cognitive delay and right sided hemiparesis, high anxiety leading to frequent pain crises on top of chronic pain syndrome, poor social structure at home with no real support, and iron overload secondary to repeated transfusions. She was recently diagnosed with bilateral PEs    Major Topics of the Visit:  1. Pain Management: We discussed our current strategy. She has weaned off Oxycontin by her own request. Given that she just got an oxycodone Rx two days ago, I preferred to change her NSAID regimen first. We are switching to naproxen 750 mg BID to see if that will help with some of the gaps between ibuprofen dosing (also doing q12h). We will continue to try and stay off Oxycontin and limit the addition of more medications but she may need some increased Oxycodone at some point.  2. Iron overload/Pharmacy Issues: She is now on chelation  3. High RBC turnover: Jennifer really has a high degree of RBC turnover, more than most patients I have seen. Oxybryta led to more frequent pain episodes (chest pain, which was new) and did not change the RBC turnover so it was stopped in December.  4. Pulmonary Emboli: continue rivaroxaban. She does report taking it with meals as appropriate. Holding aspirin for 3 months.  5. Follow up: 3 weeks with me.     I spent a total of 21 minutes face-to-face with Jennifer Cervantes during today's office visit.     Review of external notes as documented above   Review of the result(s) of each unique test - CBC, V/Q scan  Diagnosis or treatment significantly limited by social determinants of health - sickle cell disease, racial inequity, difficult social situation at home    40 min spent on the date of the encounter in chart review, patient visit,  review of tests, documentation and/or discussion with other providers about the issues documented above.       Eric Duncan MD  Hematologist  Division of Hematology, Oncology, and Transplantation  [facility]  [facility]  Pager: [phone]

## 2021-02-08 ENCOUNTER — TELEPHONE (OUTPATIENT)
Dept: ONCOLOGY | Facility: CLINIC | Age: 21
End: 2021-02-08

## 2021-02-08 NOTE — PROGRESS NOTES
Oncology/Hematology Visit Note  Feb 10, 2021    Reason for Visit: Follow up of sickle cell hgbSS disease     History of Present Illness: HgbSS complicated by frequent pain crises (acute and chronic components), history of stroke leading to significant cognitive delays and right upper extremity hemiparesis, iron overload 2/2 chronic transfusions as secondary ppx post-CVA, anxiety/depression, asthma. Please see Dr. Duncan's detailed note from 2/28/20 for complete hematologic history. She is currently on Hydrea and Jadenu.    She was admitted 2/1/21-2/3/21 with a new PE, started on Rivaroxaban.      She was seen today for hematology follow-up.     Interval History:  Jennifer was seen today for oncology follow-up. She is in tears because her back pain is so severe. She states it started yesterday and while infusion helped a little she had significant pain overnight into today. The pain is in her low back, bilateral, without radiation. No urinary symptoms to suggest kidney issue. She has no other areas of pain except intermittent pleuritic chest pain since her PE diagnosis, no chest pain currently. She denies fevers, headaches, dizziness, SOB, cough, nausea, vomiting, abdominal pain, bowel or bladder concerns, edema. She is taking all of her medications and is doing warm bath/heating packs with little relief. She feels like she needs to change her pain meds, either add a long acting or go up on her Oxycodone.     Current Outpatient Medications   Medication Sig Dispense Refill     acetaminophen (TYLENOL) 325 MG tablet Take 2 tablets (650 mg) by mouth every 6 hours as needed for mild pain (Patient taking differently: Take 325-650 mg by mouth every 6 hours as needed for mild pain ) 120 tablet 3     albuterol (PROAIR HFA/PROVENTIL HFA/VENTOLIN HFA) 108 (90 Base) MCG/ACT inhaler Inhale 2 puffs into the lungs every 6 hours as needed for shortness of breath / dyspnea or wheezing 8.5 g 3     albuterol (PROVENTIL) (2.5 MG/3ML)  0.083% neb solution Take 1 vial (2.5 mg) by nebulization every 6 hours as needed for shortness of breath / dyspnea or wheezing 12 mL 4     aspirin (ASPIRIN) 81 MG EC tablet Take 1 tablet (81 mg) by mouth daily 90 tablet 3     budesonide-formoterol (SYMBICORT) 160-4.5 MCG/ACT Inhaler Inhale 2 puffs into the lungs 2 times daily 10.2 g 3     celecoxib (CELEBREX) 100 MG capsule Take 1 capsule (100 mg) by mouth 2 times daily 60 capsule 3     diphenhydrAMINE (BENADRYL) 25 MG capsule Take 1-2 capsules (25-50 mg) by mouth nightly as needed for sleep 60 capsule 3     Hydroxyurea 1000 MG TABS Take 2,000 mg by mouth daily (Patient taking differently: Take 2,000 mg by mouth every evening ) 60 tablet 3     JADENU 360 MG tablet Take 4 tablets (1,440 mg) by mouth daily (Patient taking differently: Take 1,440 mg by mouth every evening ) 120 tablet 4     medroxyPROGESTERone (DEPO-PROVERA) 150 MG/ML IM injection Inject 150 mg into the muscle       naloxone (NARCAN) 4 MG/0.1ML nasal spray Spray 1 spray (4 mg) into one nostril alternating nostrils once as needed for opioid reversal every 2-3 minutes until assistance arrives 0.2 mL 1     naproxen (NAPROSYN) 375 MG tablet Take 2 tablets (750 mg) by mouth 2 times daily (with meals) 60 tablet 3     ondansetron (ZOFRAN) 8 MG tablet        oxyCODONE IR (ROXICODONE) 10 MG tablet Take 1 tablet (10 mg) by mouth every 6 hours as needed for severe pain 60 tablet 0     oxyCODONE IR (ROXICODONE) 10 MG tablet Take 1 tablet (10mg) by mouth every 6 hours for pain. If, after 2 doses it is not working, try a dose at 4 hours and call clinic. 60 tablet 0     rivaroxaban ANTICOAGULANT (XARELTO) 15 MG TABS tablet Take 1 tablet (15 mg) by mouth 2 times daily (with meals) for 27 days 54 tablet 0     [START ON 2/23/2021] rivaroxaban ANTICOAGULANT (XARELTO) 20 MG TABS tablet Take 1 tablet (20 mg) by mouth daily (with dinner) 63 tablet 0     sertraline (ZOLOFT) 100 MG tablet Take 2 tablets (200 mg) by mouth  "daily 180 tablet 3     Physical Examination:  /82 (BP Location: Right arm, Patient Position: Sitting, Cuff Size: Adult Regular)   Pulse 111   Temp 98.3  F (36.8  C)   Resp 20   Ht 1.626 m (5' 4.02\")   Wt 76.4 kg (168 lb 8 oz)   SpO2 96%   BMI 28.91 kg/m    Wt Readings from Last 10 Encounters:   02/02/21 73 kg (161 lb)   01/29/21 73 kg (161 lb)   01/27/21 73.2 kg (161 lb 4.8 oz)   01/22/21 73.5 kg (162 lb)   01/21/21 73.5 kg (162 lb)   01/18/21 73.7 kg (162 lb 6.4 oz)   01/15/21 73 kg (161 lb)   01/14/21 70.3 kg (155 lb)   01/12/21 70.3 kg (155 lb)   01/10/21 72.6 kg (160 lb)     Constitutional: Well-appearing female who appears uncomfortable but in no acute distress. Tearful  Eyes: EOMI, PERRL. No scleral icterus.  ENT: Deferred.   Lymphatic: Neck is supple without cervical or supraclavicular lymphadenopathy.   Cardiovascular: Tachycardic rate and regular rhythm. No murmurs, gallops, or rubs. No peripheral edema.  Respiratory: Clear to auscultation bilaterally. No wheezes or crackles.  Gastrointestinal: Bowel sounds present. Abdomen soft, non-tender.   Neurologic: Cranial nerves II through XII are grossly intact.  Skin: No rashes, petechiae, or bruising noted on exposed skin.  MSK: No tenderness to palpation over low back    Laboratory Data:  Results for HIMANSHU AL (MRN [id number]) as of 2/10/2021 13:01   2/10/2021 11:53   Sodium 141   Potassium 3.8   Chloride 113 (H)   Carbon Dioxide 23   Urea Nitrogen 10   Creatinine 0.50 (L)   GFR Estimate >90   GFR Estimate If Black >90   Calcium 8.6   Anion Gap 5   Albumin 4.1   Protein Total 7.5   Bilirubin Total 2.5 (H)   Alkaline Phosphatase 69   ALT 71 (H)   AST 74 (H)   Glucose 90   WBC 13.7 (H)   Hemoglobin 7.9 (L)   Hematocrit 22.4 (L)   Platelet Count 315   RBC Count 2.40 (L)   MCV 93   MCH 32.9   MCHC 35.3   RDW 20.2 (H)   Diff Method Automated Method   % Neutrophils 69.0   % Lymphocytes 17.4   % Monocytes 7.5   % Eosinophils 5.7   % Basophils 0.0   % " Immature Granulocytes 0.4   Nucleated RBCs 2 (H)   Absolute Neutrophil 9.5 (H)   Absolute Lymphocytes 2.5   Absolute Monocytes 1.0   Absolute Eosinophils 1.0 (H)   Absolute Basophils 0.0   Abs Immature Granulocytes 0.0   Absolute Nucleated RBC 0.5   % Retic 26.9 (H)   Absolute Retic 631.0 (H)       Assessment and Plan:  1. Sickle Cell Disease  HgbSS, has been having more issues with pain recently with more ED visits and recent admission for pain and PE. Today she is clearly uncomfortable with severe back pain though no other symptoms. Concern for acute crisis, likely cold weather trigger given no other symptoms.     Managed acute crisis today with IVF and IV morphine and symptoms improved and she felt comfortable going home.      Labs: Labs today are overall at her baseline, though retic and leukcytosis slightly higher concerning for pain crisis. LFT elevation stable to improved.      Meds: Stopped Voxeletor. Continue Hydrea 2000mg daily. Contineu Jadenu 4 tablets daily.     Pain Control: Now off OxyContin. Having more pain issues overall. Discussed with Dr. Duncan-will increase Oxycodone to 15mg PRN-will start this new dose once she finishes the 10mg tabs she currently has, Tylenol PRN, and Celebrex PRN. Stop Naproxen. Try to take less Ibuprofen while on Xarelto. Avoid long acting narcotics. Could consider muscle relaxant in future if needed.    Follow-up: Will request visit in 2 weeks with myself or Dr. Duncan     2. Neuro  History of stroke, no new concerns. ASA on hold while on anticoagulation for PE. No new neuro concerns.     3. Psych  History of anxiety and depression. Continue Sertraline. Does well with consistent care-will try to make sure she can follow with myself and Dr. Duncan. Not discussed  Today      Continue Benadryl PRN for insomnia, not discussed.     4. Pulm  History of asthma, continue Symbicort and Albuterol PRN. Denies any respiratory concerns.    Bilateral PE, diagnosed 2/1/21, now on  Rivaroxaban 15mg BID for 3 weeks through 2/23, then 20mg daily. Still having intermittent pleuritic chest pain but no persistent pain, SOB, hypoxia, cough to warrant repeat imaging at this time. Will be on Rivaroxaban for 3 months.     See prior note for health maintenance.    40 minutes spent on the date of the encounter doing chart review, review of test results, interpretation of tests, patient visit, documentation and discussion with other provider(s)     Andrei Machado PA-C  [facility]  [address] [place] [address] [id number] [phone]

## 2021-02-08 NOTE — TELEPHONE ENCOUNTER
Jennifer called to inquire if any openings were available. As of 1525 no IVF/Pain infusion appts available today. This writer instructed to call in morning. Phone lines open at 7:00am to be added to tomorrow's infusion list.     Follow-Up: Instructed patient to seek care immediately for worsening symptoms, including: fever, chest pain, shortness of breath, dizziness.

## 2021-02-08 NOTE — TELEPHONE ENCOUNTER
Pt called in to triage requesting IVF pain meds for low back pain rated 7.5/10 since last night. Stated last took prn Oxy IR 10 mg, ibuprofen, tylenol, did a warm bath and warm fluids around 10 am this morning without relief. Did wake up in the middle of the night and took Oxy then too.    Denied any fevers, chills, cough, sob, chest pain or other symptoms. Pt's last infusion was 1/29, last clinic visit 2/5 with follow-up on 2/10 with Andrei HIGGINS. Pt denied being out of home medications and needing any refills today. Pt qualifies for sickle cell standing order protocol.

## 2021-02-09 ENCOUNTER — INFUSION THERAPY VISIT (OUTPATIENT)
Dept: ONCOLOGY | Facility: CLINIC | Age: 21
End: 2021-02-09
Attending: PHYSICIAN ASSISTANT
Payer: COMMERCIAL

## 2021-02-09 ENCOUNTER — PATIENT OUTREACH (OUTPATIENT)
Dept: CARE COORDINATION | Facility: CLINIC | Age: 21
End: 2021-02-09

## 2021-02-09 ENCOUNTER — TELEPHONE (OUTPATIENT)
Dept: ONCOLOGY | Facility: CLINIC | Age: 21
End: 2021-02-09

## 2021-02-09 VITALS
RESPIRATION RATE: 18 BRPM | BODY MASS INDEX: 28.29 KG/M2 | OXYGEN SATURATION: 96 % | HEART RATE: 103 BPM | WEIGHT: 164.8 LBS | SYSTOLIC BLOOD PRESSURE: 129 MMHG | TEMPERATURE: 98.8 F | DIASTOLIC BLOOD PRESSURE: 72 MMHG

## 2021-02-09 DIAGNOSIS — D57.00 SICKLE CELL PAIN CRISIS (H): Primary | ICD-10-CM

## 2021-02-09 PROCEDURE — 96361 HYDRATE IV INFUSION ADD-ON: CPT

## 2021-02-09 PROCEDURE — 96376 TX/PRO/DX INJ SAME DRUG ADON: CPT

## 2021-02-09 PROCEDURE — 250N000011 HC RX IP 250 OP 636: Performed by: PHYSICIAN ASSISTANT

## 2021-02-09 PROCEDURE — 96374 THER/PROPH/DIAG INJ IV PUSH: CPT

## 2021-02-09 PROCEDURE — 258N000003 HC RX IP 258 OP 636: Performed by: PHYSICIAN ASSISTANT

## 2021-02-09 RX ORDER — ONDANSETRON 8 MG/1
8 TABLET, FILM COATED ORAL
Status: CANCELLED
Start: 2021-02-09

## 2021-02-09 RX ORDER — HEPARIN SODIUM (PORCINE) LOCK FLUSH IV SOLN 100 UNIT/ML 100 UNIT/ML
500 SOLUTION INTRAVENOUS ONCE
Status: COMPLETED | OUTPATIENT
Start: 2021-02-09 | End: 2021-02-09

## 2021-02-09 RX ORDER — MORPHINE SULFATE 2 MG/ML
2 INJECTION, SOLUTION INTRAMUSCULAR; INTRAVENOUS
Status: DISCONTINUED | OUTPATIENT
Start: 2021-02-09 | End: 2021-02-09 | Stop reason: HOSPADM

## 2021-02-09 RX ORDER — HEPARIN SODIUM (PORCINE) LOCK FLUSH IV SOLN 100 UNIT/ML 100 UNIT/ML
5 SOLUTION INTRAVENOUS
Status: CANCELLED | OUTPATIENT
Start: 2021-02-09

## 2021-02-09 RX ORDER — HEPARIN SODIUM,PORCINE 10 UNIT/ML
5 VIAL (ML) INTRAVENOUS
Status: CANCELLED | OUTPATIENT
Start: 2021-02-09

## 2021-02-09 RX ORDER — DIPHENHYDRAMINE HCL 25 MG
25 CAPSULE ORAL
Status: CANCELLED
Start: 2021-02-09

## 2021-02-09 RX ORDER — MORPHINE SULFATE 2 MG/ML
2 INJECTION, SOLUTION INTRAMUSCULAR; INTRAVENOUS
Status: CANCELLED
Start: 2021-02-09

## 2021-02-09 RX ADMIN — Medication 500 UNITS: at 15:07

## 2021-02-09 RX ADMIN — DEXTROSE AND SODIUM CHLORIDE 500 ML: 5; 450 INJECTION, SOLUTION INTRAVENOUS at 12:55

## 2021-02-09 RX ADMIN — MORPHINE SULFATE 2 MG: 2 INJECTION, SOLUTION INTRAMUSCULAR; INTRAVENOUS at 13:55

## 2021-02-09 RX ADMIN — MORPHINE SULFATE 2 MG: 2 INJECTION, SOLUTION INTRAMUSCULAR; INTRAVENOUS at 12:56

## 2021-02-09 RX ADMIN — MORPHINE SULFATE 2 MG: 2 INJECTION, SOLUTION INTRAMUSCULAR; INTRAVENOUS at 14:55

## 2021-02-09 NOTE — PROGRESS NOTES
Social Work Follow-Up  [facility]    Data/Intervention:  Patient Name:  Jennifer Cervantes  /Age:  2000 (20 year old)    Reason for Follow-Up:  Transportation Assistance    Collaborated With:    -JOE Patel  -Pt  -[employer]    Intervention/Education/Resources Provided:  SW received message from JOE Patel that pt is needing a ride for appointment for today. Pt did try and call her insurance to schedule a ride, but unable to due to last minute appointment. SW called [employer] and set up a ride for pt's appointment at 12:30pm today. Ride has been set up with [employer] and will pick pt up around 11:50am, will call for return ride. [employer] called pt to relay this information, but had to leave a voicemail. [employer] provided pt via  the phone number for return ride, [phone]. [employer] relayed this information to JOE Patel as well.    Assessment/Plan:  SW will continue to remain available as needed.  Previously provided patient/family with writer's contact information and availability.       CARLOS Chavez,[facility]  Hematology/Oncology Social Worker  Phone:[phone] Pager: [phone]

## 2021-02-09 NOTE — TELEPHONE ENCOUNTER
TC from pt asking to come in for IVF's and IV pain medications. Per yesterday's notes and triage staff, pt unable to be worked in for infusion yesterday and was directed to call back this morning if pain continued and she didn't end up in the ED overnight. Pt reports pain continues in her lower back and she took oxycodone 10 mg and ibuprofen approximately one hour ago without relief. Infusion request sent via Teams. Await response.     TC to pt to let her know that infusion can accommodate her at 1230 today for IVFs and pain meds. High priority scheduling request sent to [facility]. Pt stated understanding of all and agreed to call clinic with any questions or concerns.

## 2021-02-09 NOTE — PATIENT INSTRUCTIONS
Contact Numbers  [facility]: [phone]    After Hours:  [phone]  Triage: [phone]    Please call the [facility] Triage line if you experience a temperature greater than or equal to 100.5, shaking chills, have uncontrolled nausea, vomiting and/or diarrhea, dizziness, shortness of breath, chest pain, bleeding, unexplained bruising, or if you have any other new/concerning symptoms, questions or concerns.     If it is after hours, weekends, or holidays, please call the main hospital  at  [phone] and ask to speak to the Oncology doctor on call.     If you are having any concerning symptoms or wish to speak to a provider before your next infusion visit, please call your care coordinator or triage to notify them so we can adequately serve you.     If you need a refill on a narcotic prescription or other medication, please call triage before your infusion appointment.         February 2021 Sunday Monday Tuesday Wednesday Thursday Friday Saturday        1    NM LUNG SCAN VENT/PERF   1:00 AM   (90 min.)   [id number]   [facility]    Admission   9:36 PM   Pedro Tinajero DO   [facility] [address] [place]   (Discharge: 2/3/2021) 2    XR CHEST PORT 1 VIEW   8:10 AM   (20 min.)   [id number]   [facility]    ECHO COMPLETE   8:20 AM   (60 min.)   [id number]   [facility] 3     4     5    VIDEO VISIT RETURN   2:15 PM   (30 min.)   Eric Duncan MD   [facility] 6       7     8     9    [facility] ONC INFUSION 120  12:30 PM   (120 min.)   [facility] INFUSION   [facility] 10    LAB CENTRAL  10:45 AM   (15 min.)   [facility] DRAW   [facility] RETURN  10:55 AM   (50 min.)   Andrei Machado PA   [facility] 11    MR ABDOMEN WO   7:30 AM   (60 min.)   [id number]   [facility]    MR MYOCARDIUM WO   9:00 AM   (105 min.)   UUMR4   [facility]    VIDEO VISIT NEW   2:45 PM   (30 min.)   Suraj Case MD   [facility] [place] 12     13       14     15     16     17    VIDEO VISIT NEW   3:20 PM   (80 min.)   Rosalva Gasca MD   [facility] [place] 18     19     20       21     22     23     24     25     26     27       28                                               March 2021 Sunday Monday Tuesday Wednesday Thursday Friday Saturday        1     2     3     4  Happy Birthday!     5     6       7     8     9     10     11     12     13       14     15     16     17     18     19     20       21     22     23     24     25     26     27       28     29     30     31                                    Lab Results:  No results found for this or any previous visit (from the past 12 hour(s)).

## 2021-02-09 NOTE — TELEPHONE ENCOUNTER
Pt called back stating she tried to get a ride set up for apt today by calling [employer] at [phone], stated rep wanted to confirm her apt first and he tried calling [facility] twice but was told both times pt did not have an appointment set up today.    Informed her scheduling has not finalized her apt yet, but next time insurance needs to verify this info tell them to call and speak with triage, not just the  or scheduling who may not have gotten the scheduling message yet, she verbalized understanding but was very upset with the process. Informed her will route to  high priority to set up round trip ride for her today and have them follow-up with her once confirmed, she verbalized understanding.

## 2021-02-09 NOTE — PROGRESS NOTES
"Infusion Nursing Note:  Jennifer Cervantes presents today for IVF/pain medication.    Patient seen by provider today: No   present during visit today: Not Applicable.    Note: Patient endorses non radiating constant sharp pain on lower back with PS 10/10. States \" It's my usual sickle cell pain.\" Denies nausea/vomiting nor chest and abdominal discomfort. No new concerns made. Otherwise well.     Patient did meet criteria for an asymptomatic covid-19 PCR test in infusion today. Patient declined the covid-19 test.    Upon discharge patient had total of 3 doses of IV Morphine with PS 7/10. Patient agreed to go home. Instructed patient if symptoms persists/worsen to call triage.      Intravenous Access:  Implanted Port.    Treatment Conditions:  Not Applicable.      Post Infusion Assessment:  Patient tolerated infusion without incident.  Blood return noted pre and post infusion.  Site patent and intact, free from redness, edema or discomfort.  No evidence of extravasations.  Access discontinued per protocol.       Discharge Plan:   Patient declined prescription refills.  Discharge instructions reviewed with: Patient.  Patient and/or family verbalized understanding of discharge instructions and all questions answered.  AVS to patient via [employer].  Patient will return 2/10/21 for next appointment.   Patient discharged in stable condition accompanied by: self.  Departure Mode: Ambulatory.      GLORIA YANCEY RN                        "

## 2021-02-10 ENCOUNTER — APPOINTMENT (OUTPATIENT)
Dept: LAB | Facility: CLINIC | Age: 21
End: 2021-02-10
Attending: PEDIATRICS
Payer: COMMERCIAL

## 2021-02-10 ENCOUNTER — INFUSION THERAPY VISIT (OUTPATIENT)
Dept: TRANSPLANT | Facility: CLINIC | Age: 21
End: 2021-02-10
Attending: PEDIATRICS
Payer: COMMERCIAL

## 2021-02-10 ENCOUNTER — ONCOLOGY VISIT (OUTPATIENT)
Dept: ONCOLOGY | Facility: CLINIC | Age: 21
End: 2021-02-10
Attending: PEDIATRICS
Payer: COMMERCIAL

## 2021-02-10 ENCOUNTER — TELEPHONE (OUTPATIENT)
Dept: ONCOLOGY | Facility: CLINIC | Age: 21
End: 2021-02-10

## 2021-02-10 VITALS
HEIGHT: 64 IN | TEMPERATURE: 98.3 F | OXYGEN SATURATION: 96 % | DIASTOLIC BLOOD PRESSURE: 82 MMHG | RESPIRATION RATE: 20 BRPM | HEART RATE: 111 BPM | BODY MASS INDEX: 28.77 KG/M2 | SYSTOLIC BLOOD PRESSURE: 129 MMHG | WEIGHT: 168.5 LBS

## 2021-02-10 DIAGNOSIS — D57.00 SICKLE CELL PAIN CRISIS (H): Primary | ICD-10-CM

## 2021-02-10 DIAGNOSIS — D57.1 HB-SS DISEASE WITHOUT CRISIS (H): ICD-10-CM

## 2021-02-10 DIAGNOSIS — Z86.73 HISTORY OF STROKE: ICD-10-CM

## 2021-02-10 DIAGNOSIS — I26.99 OTHER ACUTE PULMONARY EMBOLISM WITHOUT ACUTE COR PULMONALE (H): ICD-10-CM

## 2021-02-10 LAB
ALBUMIN SERPL-MCNC: 4.1 G/DL (ref 3.4–5)
ALP SERPL-CCNC: 69 U/L (ref 40–150)
ALT SERPL W P-5'-P-CCNC: 71 U/L (ref 0–50)
ANION GAP SERPL CALCULATED.3IONS-SCNC: 5 MMOL/L (ref 3–14)
AST SERPL W P-5'-P-CCNC: 74 U/L (ref 0–45)
BASOPHILS # BLD AUTO: 0 10E9/L (ref 0–0.2)
BASOPHILS NFR BLD AUTO: 0 %
BILIRUB SERPL-MCNC: 2.5 MG/DL (ref 0.2–1.3)
BUN SERPL-MCNC: 10 MG/DL (ref 7–30)
CALCIUM SERPL-MCNC: 8.6 MG/DL (ref 8.5–10.1)
CHLORIDE SERPL-SCNC: 113 MMOL/L (ref 94–109)
CO2 SERPL-SCNC: 23 MMOL/L (ref 20–32)
CREAT SERPL-MCNC: 0.5 MG/DL (ref 0.52–1.04)
DIFFERENTIAL METHOD BLD: ABNORMAL
EOSINOPHIL # BLD AUTO: 1 10E9/L (ref 0–0.7)
EOSINOPHIL NFR BLD AUTO: 5.7 %
ERYTHROCYTE [DISTWIDTH] IN BLOOD BY AUTOMATED COUNT: 20.2 % (ref 10–15)
GFR SERPL CREATININE-BSD FRML MDRD: >90 ML/MIN/{1.73_M2}
GLUCOSE SERPL-MCNC: 90 MG/DL (ref 70–99)
HCT VFR BLD AUTO: 22.4 % (ref 35–47)
HGB BLD-MCNC: 7.9 G/DL (ref 11.7–15.7)
IMM GRANULOCYTES # BLD: 0 10E9/L (ref 0–0.4)
IMM GRANULOCYTES NFR BLD: 0.4 %
LYMPHOCYTES # BLD AUTO: 2.5 10E9/L (ref 0.8–5.3)
LYMPHOCYTES NFR BLD AUTO: 17.4 %
MCH RBC QN AUTO: 32.9 PG (ref 26.5–33)
MCHC RBC AUTO-ENTMCNC: 35.3 G/DL (ref 31.5–36.5)
MCV RBC AUTO: 93 FL (ref 78–100)
MONOCYTES # BLD AUTO: 1 10E9/L (ref 0–1.3)
MONOCYTES NFR BLD AUTO: 7.5 %
NEUTROPHILS # BLD AUTO: 9.5 10E9/L (ref 1.6–8.3)
NEUTROPHILS NFR BLD AUTO: 69 %
NRBC # BLD AUTO: 0.5 10*3/UL
NRBC BLD AUTO-RTO: 2 /100
PLATELET # BLD AUTO: 315 10E9/L (ref 150–450)
POTASSIUM SERPL-SCNC: 3.8 MMOL/L (ref 3.4–5.3)
PROT SERPL-MCNC: 7.5 G/DL (ref 6.8–8.8)
RBC # BLD AUTO: 2.4 10E12/L (ref 3.8–5.2)
RETICS # AUTO: 631 10E9/L (ref 25–95)
RETICS/RBC NFR AUTO: 26.9 % (ref 0.5–2)
SODIUM SERPL-SCNC: 141 MMOL/L (ref 133–144)
WBC # BLD AUTO: 13.7 10E9/L (ref 4–11)

## 2021-02-10 PROCEDURE — 96376 TX/PRO/DX INJ SAME DRUG ADON: CPT

## 2021-02-10 PROCEDURE — G0463 HOSPITAL OUTPT CLINIC VISIT: HCPCS

## 2021-02-10 PROCEDURE — 96374 THER/PROPH/DIAG INJ IV PUSH: CPT

## 2021-02-10 PROCEDURE — G0463 HOSPITAL OUTPT CLINIC VISIT: HCPCS | Mod: 25

## 2021-02-10 PROCEDURE — 258N000003 HC RX IP 258 OP 636: Performed by: PHYSICIAN ASSISTANT

## 2021-02-10 PROCEDURE — 36591 DRAW BLOOD OFF VENOUS DEVICE: CPT

## 2021-02-10 PROCEDURE — 85045 AUTOMATED RETICULOCYTE COUNT: CPT | Performed by: PHYSICIAN ASSISTANT

## 2021-02-10 PROCEDURE — 80053 COMPREHEN METABOLIC PANEL: CPT | Performed by: PHYSICIAN ASSISTANT

## 2021-02-10 PROCEDURE — 250N000011 HC RX IP 250 OP 636: Performed by: PHYSICIAN ASSISTANT

## 2021-02-10 PROCEDURE — 96361 HYDRATE IV INFUSION ADD-ON: CPT

## 2021-02-10 PROCEDURE — 99215 OFFICE O/P EST HI 40 MIN: CPT | Performed by: PHYSICIAN ASSISTANT

## 2021-02-10 PROCEDURE — 85025 COMPLETE CBC W/AUTO DIFF WBC: CPT | Performed by: PHYSICIAN ASSISTANT

## 2021-02-10 RX ORDER — HEPARIN SODIUM,PORCINE 10 UNIT/ML
5 VIAL (ML) INTRAVENOUS
Status: CANCELLED | OUTPATIENT
Start: 2021-02-10

## 2021-02-10 RX ORDER — DIPHENHYDRAMINE HCL 25 MG
25 CAPSULE ORAL
Status: CANCELLED
Start: 2021-02-10

## 2021-02-10 RX ORDER — MORPHINE SULFATE 2 MG/ML
2 INJECTION, SOLUTION INTRAMUSCULAR; INTRAVENOUS ONCE
Status: COMPLETED | OUTPATIENT
Start: 2021-02-10 | End: 2021-02-10

## 2021-02-10 RX ORDER — ONDANSETRON 8 MG/1
8 TABLET, FILM COATED ORAL
Status: CANCELLED
Start: 2021-02-10

## 2021-02-10 RX ORDER — MORPHINE SULFATE 2 MG/ML
2 INJECTION, SOLUTION INTRAMUSCULAR; INTRAVENOUS
Status: DISCONTINUED | OUTPATIENT
Start: 2021-02-10 | End: 2021-02-10 | Stop reason: HOSPADM

## 2021-02-10 RX ORDER — HEPARIN SODIUM (PORCINE) LOCK FLUSH IV SOLN 100 UNIT/ML 100 UNIT/ML
5 SOLUTION INTRAVENOUS
Status: CANCELLED | OUTPATIENT
Start: 2021-02-10

## 2021-02-10 RX ORDER — HEPARIN SODIUM (PORCINE) LOCK FLUSH IV SOLN 100 UNIT/ML 100 UNIT/ML
5 SOLUTION INTRAVENOUS EVERY 8 HOURS PRN
Status: DISCONTINUED | OUTPATIENT
Start: 2021-02-10 | End: 2021-02-10 | Stop reason: HOSPADM

## 2021-02-10 RX ORDER — MORPHINE SULFATE 2 MG/ML
2 INJECTION, SOLUTION INTRAMUSCULAR; INTRAVENOUS
Status: CANCELLED
Start: 2021-02-10

## 2021-02-10 RX ADMIN — MORPHINE SULFATE 2 MG: 2 INJECTION, SOLUTION INTRAMUSCULAR; INTRAVENOUS at 14:21

## 2021-02-10 RX ADMIN — Medication 5 ML: at 11:55

## 2021-02-10 RX ADMIN — MORPHINE SULFATE 2 MG: 2 INJECTION, SOLUTION INTRAMUSCULAR; INTRAVENOUS at 15:32

## 2021-02-10 RX ADMIN — DEXTROSE AND SODIUM CHLORIDE 500 ML: 5; 450 INJECTION, SOLUTION INTRAVENOUS at 14:18

## 2021-02-10 RX ADMIN — MORPHINE SULFATE 2 MG: 2 INJECTION, SOLUTION INTRAMUSCULAR; INTRAVENOUS at 12:52

## 2021-02-10 ASSESSMENT — PAIN SCALES - GENERAL
PAINLEVEL: SEVERE PAIN (7)
PAINLEVEL: SEVERE PAIN (6)
PAINLEVEL: MODERATE PAIN (5)
PAINLEVEL: WORST PAIN (10)

## 2021-02-10 ASSESSMENT — MIFFLIN-ST. JEOR: SCORE: 1514.56

## 2021-02-10 NOTE — NURSING NOTE
Chief Complaint   Patient presents with     Blood Draw     Labs drawn via port by RN in lab. Line flushed and hep locked. Patient's pain 10/10 - patient sobbing. Brought directly to clinic room, CMA aware. Port left accessed.

## 2021-02-10 NOTE — LETTER
2/10/2021         RE: Jennifer NEWMAN Billy  [address]  [facility] [address]      Oncology/Hematology Visit Note  Feb 10, 2021    Reason for Visit: Follow up of sickle cell hgbSS disease     History of Present Illness: HgbSS complicated by frequent pain crises (acute and chronic components), history of stroke leading to significant cognitive delays and right upper extremity hemiparesis, iron overload 2/2 chronic transfusions as secondary ppx post-CVA, anxiety/depression, asthma. Please see Dr. Duncan's detailed note from 2/28/20 for complete hematologic history. She is currently on Hydrea and Jadenu.    She was admitted 2/1/21-2/3/21 with a new PE, started on Rivaroxaban.      She was seen today for hematology follow-up.     Interval History:  Jennifer was seen today for oncology follow-up. She is in tears because her back pain is so severe. She states it started yesterday and while infusion helped a little she had significant pain overnight into today. The pain is in her low back, bilateral, without radiation. No urinary symptoms to suggest kidney issue. She has no other areas of pain except intermittent pleuritic chest pain since her PE diagnosis, no chest pain currently. She denies fevers, headaches, dizziness, SOB, cough, nausea, vomiting, abdominal pain, bowel or bladder concerns, edema. She is taking all of her medications and is doing warm bath/heating packs with little relief. She feels like she needs to change her pain meds, either add a long acting or go up on her Oxycodone.     Current Outpatient Medications   Medication Sig Dispense Refill     acetaminophen (TYLENOL) 325 MG tablet Take 2 tablets (650 mg) by mouth every 6 hours as needed for mild pain (Patient taking differently: Take 325-650 mg by mouth every 6 hours as needed for mild pain ) 120 tablet 3     albuterol (PROAIR HFA/PROVENTIL HFA/VENTOLIN HFA) 108 (90 Base) MCG/ACT inhaler Inhale 2 puffs into the lungs every 6 hours as needed for  shortness of breath / dyspnea or wheezing 8.5 g 3     albuterol (PROVENTIL) (2.5 MG/3ML) 0.083% neb solution Take 1 vial (2.5 mg) by nebulization every 6 hours as needed for shortness of breath / dyspnea or wheezing 12 mL 4     aspirin (ASPIRIN) 81 MG EC tablet Take 1 tablet (81 mg) by mouth daily 90 tablet 3     budesonide-formoterol (SYMBICORT) 160-4.5 MCG/ACT Inhaler Inhale 2 puffs into the lungs 2 times daily 10.2 g 3     celecoxib (CELEBREX) 100 MG capsule Take 1 capsule (100 mg) by mouth 2 times daily 60 capsule 3     diphenhydrAMINE (BENADRYL) 25 MG capsule Take 1-2 capsules (25-50 mg) by mouth nightly as needed for sleep 60 capsule 3     Hydroxyurea 1000 MG TABS Take 2,000 mg by mouth daily (Patient taking differently: Take 2,000 mg by mouth every evening ) 60 tablet 3     JADENU 360 MG tablet Take 4 tablets (1,440 mg) by mouth daily (Patient taking differently: Take 1,440 mg by mouth every evening ) 120 tablet 4     medroxyPROGESTERone (DEPO-PROVERA) 150 MG/ML IM injection Inject 150 mg into the muscle       naloxone (NARCAN) 4 MG/0.1ML nasal spray Spray 1 spray (4 mg) into one nostril alternating nostrils once as needed for opioid reversal every 2-3 minutes until assistance arrives 0.2 mL 1     naproxen (NAPROSYN) 375 MG tablet Take 2 tablets (750 mg) by mouth 2 times daily (with meals) 60 tablet 3     ondansetron (ZOFRAN) 8 MG tablet        oxyCODONE IR (ROXICODONE) 10 MG tablet Take 1 tablet (10 mg) by mouth every 6 hours as needed for severe pain 60 tablet 0     oxyCODONE IR (ROXICODONE) 10 MG tablet Take 1 tablet (10mg) by mouth every 6 hours for pain. If, after 2 doses it is not working, try a dose at 4 hours and call clinic. 60 tablet 0     rivaroxaban ANTICOAGULANT (XARELTO) 15 MG TABS tablet Take 1 tablet (15 mg) by mouth 2 times daily (with meals) for 27 days 54 tablet 0     [START ON 2/23/2021] rivaroxaban ANTICOAGULANT (XARELTO) 20 MG TABS tablet Take 1 tablet (20 mg) by mouth daily (with  "dinner) 63 tablet 0     sertraline (ZOLOFT) 100 MG tablet Take 2 tablets (200 mg) by mouth daily 180 tablet 3     Physical Examination:  /82 (BP Location: Right arm, Patient Position: Sitting, Cuff Size: Adult Regular)   Pulse 111   Temp 98.3  F (36.8  C)   Resp 20   Ht 1.626 m (5' 4.02\")   Wt 76.4 kg (168 lb 8 oz)   SpO2 96%   BMI 28.91 kg/m    Wt Readings from Last 10 Encounters:   02/02/21 73 kg (161 lb)   01/29/21 73 kg (161 lb)   01/27/21 73.2 kg (161 lb 4.8 oz)   01/22/21 73.5 kg (162 lb)   01/21/21 73.5 kg (162 lb)   01/18/21 73.7 kg (162 lb 6.4 oz)   01/15/21 73 kg (161 lb)   01/14/21 70.3 kg (155 lb)   01/12/21 70.3 kg (155 lb)   01/10/21 72.6 kg (160 lb)     Constitutional: Well-appearing female who appears uncomfortable but in no acute distress. Tearful  Eyes: EOMI, PERRL. No scleral icterus.  ENT: Deferred.   Lymphatic: Neck is supple without cervical or supraclavicular lymphadenopathy.   Cardiovascular: Tachycardic rate and regular rhythm. No murmurs, gallops, or rubs. No peripheral edema.  Respiratory: Clear to auscultation bilaterally. No wheezes or crackles.  Gastrointestinal: Bowel sounds present. Abdomen soft, non-tender.   Neurologic: Cranial nerves II through XII are grossly intact.  Skin: No rashes, petechiae, or bruising noted on exposed skin.  MSK: No tenderness to palpation over low back    Laboratory Data:  Results for HIMANSHU AL (MRN [id number]) as of 2/10/2021 13:01   2/10/2021 11:53   Sodium 141   Potassium 3.8   Chloride 113 (H)   Carbon Dioxide 23   Urea Nitrogen 10   Creatinine 0.50 (L)   GFR Estimate >90   GFR Estimate If Black >90   Calcium 8.6   Anion Gap 5   Albumin 4.1   Protein Total 7.5   Bilirubin Total 2.5 (H)   Alkaline Phosphatase 69   ALT 71 (H)   AST 74 (H)   Glucose 90   WBC 13.7 (H)   Hemoglobin 7.9 (L)   Hematocrit 22.4 (L)   Platelet Count 315   RBC Count 2.40 (L)   MCV 93   MCH 32.9   MCHC 35.3   RDW 20.2 (H)   Diff Method Automated Method   % " Neutrophils 69.0   % Lymphocytes 17.4   % Monocytes 7.5   % Eosinophils 5.7   % Basophils 0.0   % Immature Granulocytes 0.4   Nucleated RBCs 2 (H)   Absolute Neutrophil 9.5 (H)   Absolute Lymphocytes 2.5   Absolute Monocytes 1.0   Absolute Eosinophils 1.0 (H)   Absolute Basophils 0.0   Abs Immature Granulocytes 0.0   Absolute Nucleated RBC 0.5   % Retic 26.9 (H)   Absolute Retic 631.0 (H)       Assessment and Plan:  1. Sickle Cell Disease  HgbSS, has been having more issues with pain recently with more ED visits and recent admission for pain and PE. Today she is clearly uncomfortable with severe back pain though no other symptoms. Concern for acute crisis, likely cold weather trigger given no other symptoms.     Managed acute crisis today with IVF and IV morphine and symptoms improved and she felt comfortable going home.      Labs: Labs today are overall at her baseline, though retic and leukcytosis slightly higher concerning for pain crisis. LFT elevation stable to improved.      Meds: Stopped Voxeletor. Continue Hydrea 2000mg daily. Contineu Jadenu 4 tablets daily.     Pain Control: Now off OxyContin. Having more pain issues overall. Discussed with Dr. Duncan-will increase Oxycodone to 15mg PRN-will start this new dose once she finishes the 10mg tabs she currently has, Tylenol PRN, and Celebrex PRN. Stop Naproxen. Try to take less Ibuprofen while on Xarelto. Avoid long acting narcotics. Could consider muscle relaxant in future if needed.    Follow-up: Will request visit in 2 weeks with myself or Dr. Duncan     2. Neuro  History of stroke, no new concerns. ASA on hold while on anticoagulation for PE. No new neuro concerns.     3. Psych  History of anxiety and depression. Continue Sertraline. Does well with consistent care-will try to make sure she can follow with myself and Dr. Duncan. Not discussed  Today      Continue Benadryl PRN for insomnia, not discussed.     4. Pulm  History of asthma, continue Symbicort  and Albuterol PRN. Denies any respiratory concerns.    Bilateral PE, diagnosed 2/1/21, now on Rivaroxaban 15mg BID for 3 weeks through 2/23, then 20mg daily. Still having intermittent pleuritic chest pain but no persistent pain, SOB, hypoxia, cough to warrant repeat imaging at this time. Will be on Rivaroxaban for 3 months.     See prior note for health maintenance.    40 minutes spent on the date of the encounter doing chart review, review of test results, interpretation of tests, patient visit, documentation and discussion with other provider(s)     Andrei Machado PA-C  [facility]  [address], [place] [id number] [phone]

## 2021-02-10 NOTE — LETTER
2/10/2021         RE: Jennifer Cervantes  [address]  [facility] [address]        Dear Colleague,    Thank you for referring your patient, Jennifer Cervantes, to the [facility] [place]. Please see a copy of my visit note below.    Infusion Nursing Note:  Jennifer Cervantes presents today for IVF and pain medications.    Patient seen by provider today: Yes: Andrei Machado   present during visit today: Not Applicable.    Note: Patient added on for IVF and pain medications per therapy plan. She had received her first dose of morphine in clinic. Patient reported pain 7/10 pain in low back when arrived to infusion. Pain medications given per therapy plan. Patient reports feeling comfortable to go home at end of infusion after 3rd dose of Morphine and pain 5/10 in her low back and called for a ride to pick her up.     Intravenous Access:  Implanted Port.    Treatment Conditions:  Lab Results   Component Value Date    HGB 7.9 02/10/2021     Lab Results   Component Value Date    WBC 13.7 02/10/2021      Lab Results   Component Value Date    ANEU 9.5 02/10/2021     Lab Results   Component Value Date     02/10/2021      Lab Results   Component Value Date     02/10/2021                   Lab Results   Component Value Date    POTASSIUM 3.8 02/10/2021           Lab Results   Component Value Date    MAG 2.1 02/02/2021            Lab Results   Component Value Date    CR 0.50 02/10/2021                   Lab Results   Component Value Date    MICAH 8.6 02/10/2021                Lab Results   Component Value Date    BILITOTAL 2.5 02/10/2021           Lab Results   Component Value Date    ALBUMIN 4.1 02/10/2021                    Lab Results   Component Value Date    ALT 71 02/10/2021           Lab Results   Component Value Date    AST 74 02/10/2021       Results reviewed, labs MET treatment parameters, ok to proceed with treatment.      Post Infusion  Assessment:  Patient tolerated infusion without incident.  Blood return noted pre and post infusion.  Site patent and intact, free from redness, edema or discomfort.  No evidence of extravasations.  Access discontinued per protocol.       Discharge Plan:   Patient discharged in stable condition accompanied by: self.  Departure Mode: Ambulatory to meet her ride    PAULINE VANEGAS RN                            Again, thank you for allowing me to participate in the care of your patient.        Sincerely,        [facility]

## 2021-02-10 NOTE — NURSING NOTE
"Oncology Rooming Note    February 10, 2021 12:05 PM   Jennifer Cervantes is a 20 year old female who presents for:    Chief Complaint   Patient presents with     Blood Draw     Labs drawn via port by RN in lab. Line flushed and hep locked. Patient's pain 10/10 - patient sobbing. Brought directly to clinic room, CMA aware. Port left accessed.     Initial Vitals: /82 (BP Location: Right arm, Patient Position: Sitting, Cuff Size: Adult Regular)   Pulse 111   Resp 20   Ht 1.626 m (5' 4.02\")   Wt 76.4 kg (168 lb 8 oz)   SpO2 92%   BMI 28.91 kg/m   Estimated body mass index is 28.91 kg/m  as calculated from the following:    Height as of this encounter: 1.626 m (5' 4.02\").    Weight as of this encounter: 76.4 kg (168 lb 8 oz). Body surface area is 1.86 meters squared.  Worst Pain (10) Comment: Data Unavailable   No LMP recorded. Patient has had an injection.  Allergies reviewed: Yes  Medications reviewed: Yes    Medications: Medication refills not needed today.  Pharmacy name entered into [employer]:    [facility] - [place], [place] - [address]  [id number] - [facility] [address] [facility] [place], [place] [phone]  [id number] - [facility] [address] AT [id number] [address] AT [address] OF [facility] [address]  [facility] - [place], [place] - [address]    Clinical concerns: Patient in 10/10 in back is sobbing from the amount of pain she is in.  Andrei was notified.      Shahriar Sheldon LPN            "

## 2021-02-10 NOTE — PROGRESS NOTES
Infusion Nursing Note:  Jennifer Cervantes presents today for IVF and pain medications.    Patient seen by provider today: Yes: Andrei Machado   present during visit today: Not Applicable.    Note: Patient added on for IVF and pain medications per therapy plan. She had received her first dose of morphine in clinic. Patient reported pain 7/10 pain in low back when arrived to infusion. Pain medications given per therapy plan. Patient reports feeling comfortable to go home at end of infusion after 3rd dose of Morphine and pain 5/10 in her low back and called for a ride to pick her up.     Intravenous Access:  Implanted Port.    Treatment Conditions:  Lab Results   Component Value Date    HGB 7.9 02/10/2021     Lab Results   Component Value Date    WBC 13.7 02/10/2021      Lab Results   Component Value Date    ANEU 9.5 02/10/2021     Lab Results   Component Value Date     02/10/2021      Lab Results   Component Value Date     02/10/2021                   Lab Results   Component Value Date    POTASSIUM 3.8 02/10/2021           Lab Results   Component Value Date    MAG 2.1 02/02/2021            Lab Results   Component Value Date    CR 0.50 02/10/2021                   Lab Results   Component Value Date    MICAH 8.6 02/10/2021                Lab Results   Component Value Date    BILITOTAL 2.5 02/10/2021           Lab Results   Component Value Date    ALBUMIN 4.1 02/10/2021                    Lab Results   Component Value Date    ALT 71 02/10/2021           Lab Results   Component Value Date    AST 74 02/10/2021       Results reviewed, labs MET treatment parameters, ok to proceed with treatment.      Post Infusion Assessment:  Patient tolerated infusion without incident.  Blood return noted pre and post infusion.  Site patent and intact, free from redness, edema or discomfort.  No evidence of extravasations.  Access discontinued per protocol.       Discharge Plan:   Patient discharged in stable  condition accompanied by: self.  Departure Mode: Ambulatory to meet her ride    PAULINE VANEGAS RN

## 2021-02-10 NOTE — NURSING NOTE
Chief Complaint   Patient presents with     Infusion     pt here for infusion of IVF and pain medication for Sickle cell crisis        [id number] [facility]

## 2021-02-10 NOTE — TELEPHONE ENCOUNTER
"[facility] Telephone Triage Note    The following symptoms were reported:   Typical    Description:            Onset:  Continuation from yesterday   Location: Back  Character: Sharp           Intensity: 8/10    Accompanying Signs & Symptoms:  none    Chest Pain:  denies     Shortness of Breath:  denies     Fever:  denies     Chills:  denies   Cough/sore throat:  denies  Other:  No other symptoms    Therapies Tried and outcome: Had a IVF/Pain yesterday, since then tried Oxycodone, Tylenol, Celebrex, Naproxen, IBUprofen    Improved by: nothing, during assessment patient stated, \"Nevermind and hung up phone\".     This writer reattempted to call back Jennifer with no answer, voice message was left to call back.     Jennifer called back    The following provider was consulted:  Dr. Perla brian   Spoke to Andrei Machado as pt scheduled for in-person visit today labs at 10:45am and provider appt at 10:55am, however Jennifer forgot about it today but was able to get to clinic and be seen by Andrei at 11:15am, approved Labs & IVF/Pain if infusion has availability.         "

## 2021-02-11 ENCOUNTER — VIRTUAL VISIT (OUTPATIENT)
Dept: INTERNAL MEDICINE | Facility: CLINIC | Age: 21
End: 2021-02-11
Payer: COMMERCIAL

## 2021-02-11 ENCOUNTER — PATIENT OUTREACH (OUTPATIENT)
Dept: ONCOLOGY | Facility: CLINIC | Age: 21
End: 2021-02-11

## 2021-02-11 ENCOUNTER — HOSPITAL ENCOUNTER (EMERGENCY)
Facility: CLINIC | Age: 21
Discharge: HOME | End: 2021-02-12
Attending: EMERGENCY MEDICINE | Admitting: EMERGENCY MEDICINE
Payer: COMMERCIAL

## 2021-02-11 DIAGNOSIS — R87.610 ATYPICAL SQUAMOUS CELLS OF UNDETERMINED SIGNIFICANCE ON CYTOLOGIC SMEAR OF CERVIX (ASC-US): ICD-10-CM

## 2021-02-11 DIAGNOSIS — Z20.822 COVID-19 RULED OUT BY LABORATORY TESTING: ICD-10-CM

## 2021-02-11 DIAGNOSIS — D57.00 SICKLE CELL PAIN CRISIS (H): ICD-10-CM

## 2021-02-11 DIAGNOSIS — Z30.42 DEPO-PROVERA CONTRACEPTIVE STATUS: ICD-10-CM

## 2021-02-11 LAB
ANION GAP SERPL CALCULATED.3IONS-SCNC: 6 MMOL/L (ref 3–14)
BASOPHILS # BLD AUTO: 0.1 10E9/L (ref 0–0.2)
BASOPHILS NFR BLD AUTO: 0.5 %
BUN SERPL-MCNC: 10 MG/DL (ref 7–30)
CALCIUM SERPL-MCNC: 9 MG/DL (ref 8.5–10.1)
CHLORIDE SERPL-SCNC: 109 MMOL/L (ref 94–109)
CO2 SERPL-SCNC: 23 MMOL/L (ref 20–32)
CREAT SERPL-MCNC: 0.68 MG/DL (ref 0.52–1.04)
DIFFERENTIAL METHOD BLD: ABNORMAL
EOSINOPHIL # BLD AUTO: 0.3 10E9/L (ref 0–0.7)
EOSINOPHIL NFR BLD AUTO: 1.2 %
ERYTHROCYTE [DISTWIDTH] IN BLOOD BY AUTOMATED COUNT: 20.3 % (ref 10–15)
FLUAV RNA RESP QL NAA+PROBE: NEGATIVE
FLUBV RNA RESP QL NAA+PROBE: NEGATIVE
GFR SERPL CREATININE-BSD FRML MDRD: >90 ML/MIN/{1.73_M2}
GLUCOSE SERPL-MCNC: 108 MG/DL (ref 70–99)
HCT VFR BLD AUTO: 21.8 % (ref 35–47)
HGB BLD-MCNC: 7.6 G/DL (ref 11.7–15.7)
IMM GRANULOCYTES # BLD: 0.1 10E9/L (ref 0–0.4)
IMM GRANULOCYTES NFR BLD: 0.5 %
LABORATORY COMMENT REPORT: NORMAL
LYMPHOCYTES # BLD AUTO: 1.8 10E9/L (ref 0.8–5.3)
LYMPHOCYTES NFR BLD AUTO: 7.9 %
MCH RBC QN AUTO: 32.3 PG (ref 26.5–33)
MCHC RBC AUTO-ENTMCNC: 34.9 G/DL (ref 31.5–36.5)
MCV RBC AUTO: 93 FL (ref 78–100)
MONOCYTES # BLD AUTO: 1.3 10E9/L (ref 0–1.3)
MONOCYTES NFR BLD AUTO: 5.7 %
NEUTROPHILS # BLD AUTO: 19.2 10E9/L (ref 1.6–8.3)
NEUTROPHILS NFR BLD AUTO: 84.2 %
NRBC # BLD AUTO: 0.3 10*3/UL
NRBC BLD AUTO-RTO: 1 /100
PLATELET # BLD AUTO: 338 10E9/L (ref 150–450)
POTASSIUM SERPL-SCNC: 3.8 MMOL/L (ref 3.4–5.3)
RBC # BLD AUTO: 2.35 10E12/L (ref 3.8–5.2)
RETICS # AUTO: 519.4 10E9/L (ref 25–95)
RETICS/RBC NFR AUTO: 22.1 % (ref 0.5–2)
RSV RNA SPEC QL NAA+PROBE: NEGATIVE
SARS-COV-2 RNA RESP QL NAA+PROBE: NEGATIVE
SODIUM SERPL-SCNC: 138 MMOL/L (ref 133–144)
SPECIMEN SOURCE: NORMAL
WBC # BLD AUTO: 22.8 10E9/L (ref 4–11)

## 2021-02-11 PROCEDURE — 258N000003 HC RX IP 258 OP 636: Performed by: EMERGENCY MEDICINE

## 2021-02-11 PROCEDURE — 80048 BASIC METABOLIC PNL TOTAL CA: CPT | Performed by: EMERGENCY MEDICINE

## 2021-02-11 PROCEDURE — 99285 EMERGENCY DEPT VISIT HI MDM: CPT | Mod: 25 | Performed by: EMERGENCY MEDICINE

## 2021-02-11 PROCEDURE — C9803 HOPD COVID-19 SPEC COLLECT: HCPCS | Performed by: EMERGENCY MEDICINE

## 2021-02-11 PROCEDURE — 99285 EMERGENCY DEPT VISIT HI MDM: CPT | Performed by: EMERGENCY MEDICINE

## 2021-02-11 PROCEDURE — 96374 THER/PROPH/DIAG INJ IV PUSH: CPT | Performed by: EMERGENCY MEDICINE

## 2021-02-11 PROCEDURE — 87636 SARSCOV2 & INF A&B AMP PRB: CPT | Performed by: EMERGENCY MEDICINE

## 2021-02-11 PROCEDURE — 96376 TX/PRO/DX INJ SAME DRUG ADON: CPT | Performed by: EMERGENCY MEDICINE

## 2021-02-11 PROCEDURE — 85045 AUTOMATED RETICULOCYTE COUNT: CPT | Performed by: EMERGENCY MEDICINE

## 2021-02-11 PROCEDURE — 96361 HYDRATE IV INFUSION ADD-ON: CPT | Performed by: EMERGENCY MEDICINE

## 2021-02-11 PROCEDURE — 250N000011 HC RX IP 250 OP 636: Performed by: EMERGENCY MEDICINE

## 2021-02-11 PROCEDURE — 99213 OFFICE O/P EST LOW 20 MIN: CPT | Mod: GT | Performed by: PEDIATRICS

## 2021-02-11 PROCEDURE — 85025 COMPLETE CBC W/AUTO DIFF WBC: CPT | Performed by: EMERGENCY MEDICINE

## 2021-02-11 PROCEDURE — 96375 TX/PRO/DX INJ NEW DRUG ADDON: CPT | Performed by: EMERGENCY MEDICINE

## 2021-02-11 RX ORDER — SODIUM CHLORIDE, SODIUM LACTATE, POTASSIUM CHLORIDE, CALCIUM CHLORIDE 600; 310; 30; 20 MG/100ML; MG/100ML; MG/100ML; MG/100ML
1000 INJECTION, SOLUTION INTRAVENOUS CONTINUOUS
Status: DISCONTINUED | OUTPATIENT
Start: 2021-02-11 | End: 2021-02-12 | Stop reason: HOSPADM

## 2021-02-11 RX ORDER — ONDANSETRON 2 MG/ML
4 INJECTION INTRAMUSCULAR; INTRAVENOUS EVERY 30 MIN PRN
Status: DISCONTINUED | OUTPATIENT
Start: 2021-02-11 | End: 2021-02-12 | Stop reason: HOSPADM

## 2021-02-11 RX ORDER — MORPHINE SULFATE 2 MG/ML
2 INJECTION, SOLUTION INTRAMUSCULAR; INTRAVENOUS
Status: DISCONTINUED | OUTPATIENT
Start: 2021-02-11 | End: 2021-02-12 | Stop reason: HOSPADM

## 2021-02-11 RX ADMIN — MORPHINE SULFATE 2 MG: 2 INJECTION, SOLUTION INTRAMUSCULAR; INTRAVENOUS at 21:15

## 2021-02-11 RX ADMIN — MORPHINE SULFATE 2 MG: 2 INJECTION, SOLUTION INTRAMUSCULAR; INTRAVENOUS at 23:55

## 2021-02-11 RX ADMIN — SODIUM CHLORIDE, POTASSIUM CHLORIDE, SODIUM LACTATE AND CALCIUM CHLORIDE 1000 ML: 600; 310; 30; 20 INJECTION, SOLUTION INTRAVENOUS at 21:16

## 2021-02-11 RX ADMIN — MORPHINE SULFATE 2 MG: 2 INJECTION, SOLUTION INTRAMUSCULAR; INTRAVENOUS at 22:33

## 2021-02-11 RX ADMIN — ONDANSETRON 4 MG: 2 INJECTION INTRAMUSCULAR; INTRAVENOUS at 21:14

## 2021-02-11 ASSESSMENT — PATIENT HEALTH QUESTIONNAIRE - PHQ9: SUM OF ALL RESPONSES TO PHQ QUESTIONS 1-9: 0

## 2021-02-11 NOTE — ASSESSMENT & PLAN NOTE
Depo shots. She was seen at [facility], but her PCP left and she needed to see a new provider. She is due for a depo shot, next between 4-5-21 and 4-19-21.  Outside records seem to say that she has an IUD - I didn't see this until after she was off the line.

## 2021-02-11 NOTE — PROGRESS NOTES
Jennifer is a 20 year old who is being evaluated via a billable video visit.      How would you like to obtain your AVS? MyChart  If the video visit is dropped, the invitation should be resent by: Text to cell phone: [phone]  Will anyone else be joining your video visit? No      Dear patient. I use the medical record to document (to the best of my ability) my understanding of:   - What you told me,  - Relevant details from my exam, records review, and/or test results, and  - My assessment and plan  If you have questions, you are welcome to contact me; I will reply as soon as time allows.      Virtual Visit Details    Type of service:  Video Visit    Status: new patient visit in my panel  Visit type: evaluation & management of one or more problems  Time note ((n2, 20'): The total time (on the date of service) for this service was 25 minutes, including discussion/face-to-face, chart review, interpretation not otherwise reported, documentation, and updating of the computerized record.      Originating Location (pt. Location): Other passenger in a car, sister driving    Distant Location (provider location):  [facility] [place]     Platform used for Visit: Doximity      Context    Jennifer Cervantes is a 20 year old woman, with concerns including:  Chief Complaint   Patient presents with     Establish Care     pt would like to establish care, follow up from hospital visits       History, update, and/or problems    Disposition comment:        She knew about this appointment, but wasn't sure who I was, or why I was calling. It appears that someone else made this appointment for her. She doesn't use [facility]. I explained I am a complex primary care provider, and was set up to help on her team.    Sickle cell disease       Ms. Cervantes has sickle cell disease, complicated by frequent pain crises of varying severity, treated with hydrea (taken every day). She sees a regular hematologist Dr. Duncan (or  sometimes RONALDO Machado), and has been in the ED several times over the previous few months.       She has pain in her lymph nodes in her neck. She takes oxycodone, ibuprofen, zofran. She gets IVF when she is having more pain.     Iron overload s/p transfusions       She has iron overload and is on jadenu (iron chelator). She has had oxygen when she is in pain. She drinks water by a jug.       She is sensitive to cold, and less so due to hotness.       She walks around 'a lot'    Additional issues:    Asthma    Pap smear 2020-August [employer].       Lives with mom and sister. She is 'very independent on her own.' She has been on a waiting list for a long time, for a place, so she is looking on her own.  She is trying to find her own place.

## 2021-02-11 NOTE — PROGRESS NOTES
"Writer received call from Jennifer who reports feeling \"off\" and \"weird\" today with a sore throat, swollen lymph nodes in her neck, and unable to swallow anything due to pain. Writer advised that she report to ER for evaluation as she may have infection or other and the pain flar she has been in recently may evolve into full crisis. Elsa voiced understanding of this and agreed to recommendation. Her pattern has been to wait until late night so that there is less waiting time, advised her that if she is having these symptoms, or develops fever, she needs to go in sooner. She does not have primary care established and we will set up referral for her for this and schedule follow up visit with our clinic.  "

## 2021-02-11 NOTE — NURSING NOTE
Chief Complaint   Patient presents with     Establish Care     pt would like to establish care, follow up from hospital visits       Dany Davidson CMA, EMT at 2:23 PM on 2/11/2021.

## 2021-02-12 VITALS
TEMPERATURE: 100 F | OXYGEN SATURATION: 98 % | DIASTOLIC BLOOD PRESSURE: 77 MMHG | SYSTOLIC BLOOD PRESSURE: 150 MMHG | HEART RATE: 118 BPM | RESPIRATION RATE: 16 BRPM

## 2021-02-12 PROCEDURE — 250N000011 HC RX IP 250 OP 636: Performed by: EMERGENCY MEDICINE

## 2021-02-12 RX ORDER — HEPARIN SODIUM,PORCINE 10 UNIT/ML
5-10 VIAL (ML) INTRAVENOUS EVERY 24 HOURS
Status: DISCONTINUED | OUTPATIENT
Start: 2021-02-12 | End: 2021-02-12 | Stop reason: HOSPADM

## 2021-02-12 RX ORDER — HEPARIN SODIUM,PORCINE 10 UNIT/ML
5-10 VIAL (ML) INTRAVENOUS
Status: DISCONTINUED | OUTPATIENT
Start: 2021-02-12 | End: 2021-02-12 | Stop reason: HOSPADM

## 2021-02-12 RX ORDER — HEPARIN SODIUM (PORCINE) LOCK FLUSH IV SOLN 100 UNIT/ML 100 UNIT/ML
5 SOLUTION INTRAVENOUS
Status: DISCONTINUED | OUTPATIENT
Start: 2021-02-12 | End: 2021-02-12 | Stop reason: HOSPADM

## 2021-02-12 RX ADMIN — Medication 5 ML: at 01:28

## 2021-02-12 NOTE — ED PROVIDER NOTES
ED Provider Note  [facility]      History     Chief Complaint   Patient presents with     Sickle Cell Pain Crisis     Pharyngitis     HPI  Jennifer Cervantes is a 20 year old female with a medical history significant for sickle cell disease, prior CVA, asthma and depression who presents to the Emergency Department for evaluation of a sickle cell pain crisis as well as sore throat/neck pain.  The patient reports that she has been having pain in her back, legs and arms which feels consistent with a sickle cell pain crisis.  Patient has oxycodone 10 mg every 4-6 hours at home, but this has not been helping over the last couple of days.  Patient has been seen in the infusion.  Per review of patient's chart, she was most recently seen yesterday for IV fluids and pain medication.  However, patient continues to have pain today and presents for this sickle cell pain crisis.  She denies any chest pain, shortness of breath, cough or fevers.    Patient also reports that she woke up this morning with a sore throat, neck pain and feeling that her lymph nodes are swollen.  She reports that she also woke up with a hoarse voice.  She reports that this sore throat/neck pain has been worsening throughout the day today.  She denies any runny nose, congestion or ear pain.  She denies any positive sick contacts.  Otherwise, patient denies any recent abdominal pain, nausea, vomiting, diarrhea, blood in the stool or urinary symptoms.    Per review of patient's chart, patient was recently hospitalized here from 2/1/2021 to 2/3/2021 for an acute PE found on VQ scan.  Patient was started on Xarelto.    Past Medical History  Past Medical History:   Diagnosis Date     Anemia      Anxiety      Bleeding disorder (H)      Blood clotting disorder (H)      Cerebral infarction (H) 2015     Cognitive developmental delay     low IQ. Please recognize when managing pain and planning with her     Depressive disorder      Hemiplegia  and hemiparesis following cerebral infarction affecting right dominant side (H)     right hand contractures     Iron overload due to repeated red blood cell transfusions      Migraines      Multiple subsegmental pulmonary emboli without acute cor pulmonale (H) 02/01/2021     Oppositional defiant behavior      Uncomplicated asthma      Past Surgical History:   Procedure Laterality Date     AS INSERT TUNNELED CV 2 CATH W/O PORT/PUMP       C BREAST REDUCTION (INCLUDES LIPO) TIER 3 Bilateral 04/23/2019     CHOLECYSTECTOMY       IR CVC NON TUNNEL PLACEMENT  04/07/2020     REPAIR TENDON ELBOW Right 10/02/2019    Procedure: Right Elbow Flexor Lengthening, Flexor Pronator Slide Of Wrist and Finger, Thumb Adductor Lengthening;  Surgeon: Anai Franco MD;  Location: [facility] OR     TONSILLECTOMY Bilateral 10/02/2019    Procedure: Bilateral Tonsillectomy;  Surgeon: Farhana Guy MD;  Location: [facility] OR          acetaminophen (TYLENOL) 325 MG tablet       albuterol (PROAIR HFA/PROVENTIL HFA/VENTOLIN HFA) 108 (90 Base) MCG/ACT inhaler       albuterol (PROVENTIL) (2.5 MG/3ML) 0.083% neb solution       aspirin (ASPIRIN) 81 MG EC tablet       budesonide-formoterol (SYMBICORT) 160-4.5 MCG/ACT Inhaler       celecoxib (CELEBREX) 100 MG capsule       diphenhydrAMINE (BENADRYL) 25 MG capsule       Hydroxyurea 1000 MG TABS       JADENU 360 MG tablet       medroxyPROGESTERone (DEPO-PROVERA) 150 MG/ML IM injection       naloxone (NARCAN) 4 MG/0.1ML nasal spray       ondansetron (ZOFRAN) 8 MG tablet       oxyCODONE IR (ROXICODONE) 10 MG tablet       rivaroxaban ANTICOAGULANT (XARELTO) 15 MG TABS tablet       [START ON 2/23/2021] rivaroxaban ANTICOAGULANT (XARELTO) 20 MG TABS tablet       sertraline (ZOLOFT) 100 MG tablet      Allergies   Allergen Reactions     Fish-Derived Products Hives     Seafood Hives     Contrast Dye      Diagnostic X-Ray Materials      Gadolinium      Family History  Family History   Problem Relation  Age of Onset     Sickle Cell Trait Mother      Hypertension Mother      Asthma Mother      Sickle Cell Trait Father      Social History   Social History     Tobacco Use     Smoking status: Never Smoker     Smokeless tobacco: Never Used   Substance Use Topics     Alcohol use: Not Currently     Alcohol/week: 0.0 standard drinks     Drug use: Never      Past medical history, past surgical history, medications, allergies, family history, and social history were reviewed with the patient. No additional pertinent items.       Review of Systems  A complete review of systems was performed with pertinent positives and negatives noted in the HPI, and all other systems negative.    Physical Exam   BP: 128/75  Pulse: 100  Temp: 100  F (37.8  C)  Resp: 16  SpO2: 93 %  Physical Exam  Constitutional:       General: She is not in acute distress.     Appearance: She is well-developed.   HENT:      Head: Normocephalic and atraumatic.      Nose: No congestion or rhinorrhea.      Mouth/Throat:      Mouth: Mucous membranes are moist.      Pharynx: Oropharynx is clear. Uvula midline. No pharyngeal swelling, oropharyngeal exudate, posterior oropharyngeal erythema or uvula swelling.      Tonsils: No tonsillar exudate or tonsillar abscesses.      Comments: Sublingual space is soft  Eyes:      Conjunctiva/sclera: Conjunctivae normal.      Pupils: Pupils are equal, round, and reactive to light.   Neck:      Musculoskeletal: Normal range of motion and neck supple.   Cardiovascular:      Rate and Rhythm: Regular rhythm. Tachycardia present.   Pulmonary:      Effort: Pulmonary effort is normal. No respiratory distress.      Breath sounds: No wheezing, rhonchi or rales.   Abdominal:      Palpations: Abdomen is soft.      Tenderness: There is no abdominal tenderness. There is no guarding or rebound.   Lymphadenopathy:      Cervical: No cervical adenopathy.   Skin:     General: Skin is warm.   Neurological:      Mental Status: She is alert and  oriented to person, place, and time.      Comments: Previous right upper extremity deficit at her baseline along with slight weakness in the right lower extremity at her baseline no new neurologic deficit       ED Course      Procedures     8:52 PM  The patient was seen and examined by Artie Taylor MD in Room ED14.              Results for orders placed or performed during the hospital encounter of 02/11/21   Basic metabolic panel     Status: Abnormal   Result Value Ref Range    Sodium 138 133 - 144 mmol/L    Potassium 3.8 3.4 - 5.3 mmol/L    Chloride 109 94 - 109 mmol/L    Carbon Dioxide 23 20 - 32 mmol/L    Anion Gap 6 3 - 14 mmol/L    Glucose 108 (H) 70 - 99 mg/dL    Urea Nitrogen 10 7 - 30 mg/dL    Creatinine 0.68 0.52 - 1.04 mg/dL    GFR Estimate >90 >60 mL/min/[1.73_m2]    GFR Estimate If Black >90 >60 mL/min/[1.73_m2]    Calcium 9.0 8.5 - 10.1 mg/dL   Reticulocyte count     Status: Abnormal   Result Value Ref Range    % Retic 22.1 (H) 0.5 - 2.0 %    Absolute Retic 519.4 (H) 25 - 95 10e9/L   Symptomatic Influenza A/B & SARS-CoV2 (COVID-19) Virus PCR Multiplex     Status: None    Specimen: Nasopharyngeal   Result Value Ref Range    Flu A/B & SARS-COV-2 PCR Source Nasopharyngeal     SARS-CoV-2 PCR Result NEGATIVE     Influenza A PCR Negative NEG^Negative    Influenza B PCR Negative NEG^Negative    Respiratory Syncytial Virus PCR Negative NEG^Negative    Flu A/B & SARS-CoV-2 PCR Comment (Note)    CBC with platelets differential     Status: Abnormal   Result Value Ref Range    WBC 22.8 (H) 4.0 - 11.0 10e9/L    RBC Count 2.35 (L) 3.8 - 5.2 10e12/L    Hemoglobin 7.6 (L) 11.7 - 15.7 g/dL    Hematocrit 21.8 (L) 35.0 - 47.0 %    MCV 93 78 - 100 fl    MCH 32.3 26.5 - 33.0 pg    MCHC 34.9 31.5 - 36.5 g/dL    RDW 20.3 (H) 10.0 - 15.0 %    Platelet Count 338 150 - 450 10e9/L    Diff Method Automated Method     % Neutrophils 84.2 %    % Lymphocytes 7.9 %    % Monocytes 5.7 %    % Eosinophils 1.2 %    % Basophils 0.5 %    %  Immature Granulocytes 0.5 %    Nucleated RBCs 1 (H) 0 /100    Absolute Neutrophil 19.2 (H) 1.6 - 8.3 10e9/L    Absolute Lymphocytes 1.8 0.8 - 5.3 10e9/L    Absolute Monocytes 1.3 0.0 - 1.3 10e9/L    Absolute Eosinophils 0.3 0.0 - 0.7 10e9/L    Absolute Basophils 0.1 0.0 - 0.2 10e9/L    Abs Immature Granulocytes 0.1 0 - 0.4 10e9/L    Absolute Nucleated RBC 0.3      Medications   lactated ringers infusion (has no administration in time range)   morphine (PF) injection 2 mg (2 mg Intravenous Given 2/11/21 6815)   ondansetron (ZOFRAN) injection 4 mg (4 mg Intravenous Given 2/11/21 2114)   lactated ringers BOLUS 1,000 mL (0 mLs Intravenous Stopped 2/12/21 0119)        Assessments & Plan (with Medical Decision Making)   Patient presents today for sick cell pain crisis along with 1 day of sore throat and feeling like her lymph nodes are swollen.  She denies any sick contacts.  Denies any Covid symptoms or known contacts.  Denies any other URI symptoms.  Denies any cough shortness of breath fevers chills or sputum.  Denies any symptoms of acute chest.  Her pain is in her typical spots for her pain crises.  Denies any new neurologic symptoms that she has had stroke in the past.  She is taking her anticoagulation for PE.  Laboratory work was obtained and she was treated symptomatically according to her protocol.  Her labs are similar to recent labs and her hemoglobin is stable.  Following treatment she feels better and that she can go home and manage her pain at home.  I discussed with patient in regards to her sore throat and feels like her neck is swollen I do not appreciate any swelling or asymmetry I do not see any signs of abscess or infection.  Throat exam is benign there is no tonsillar swelling or exudate.  I did swab her for Covid and flu even though these are atypical symptoms and these are negative.  This would also be used if her pain is not controlled for admission.  However patient at this time feels better and  that she can manage her pain at home.  She is stable for discharge.  She will follow-up with her primary care physician and hematology.  She understands signs and symptoms to return to the emergency department and is discharged in stable condition.    I have reviewed the nursing notes. I have reviewed the findings, diagnosis, plan and need for follow up with the patient.    New Prescriptions    No medications on file       Final diagnoses:   Sickle cell pain crisis (H)       --  I, Isaac Walton, am serving as a trained medical scribe to document services personally performed by Artie Taylor MD, based on the provider's statements to me.     I, Artie Taylor MD, was physically present and have reviewed and verified the accuracy of this note documented by Isaac Walton.    Artie Taylor MD  [facility] EMERGENCY DEPARTMENT  2/11/2021     Artie Taylor MD  02/12/21 0124

## 2021-02-12 NOTE — ED TRIAGE NOTES
Pt presents ambulatory to triage with c/o of sickle cell pain in lower back, bilateral arms, bilateral legs, and neck that started this morning. Pt feels that lymph nodes in neck may be swollen and she has a sore throat that also started this morning. Denies fever, n/v, or any other symptoms.  Arely Lee RN on 2/11/2021 at 7:48 PM

## 2021-02-12 NOTE — DISCHARGE INSTRUCTIONS
Please make an appointment to follow up with your hematologist and Your Primary Care Provider as soon as possible.

## 2021-02-15 ENCOUNTER — TELEPHONE (OUTPATIENT)
Dept: ONCOLOGY | Facility: CLINIC | Age: 21
End: 2021-02-15

## 2021-02-15 DIAGNOSIS — D57.1 HB-SS DISEASE WITHOUT CRISIS (H): Primary | ICD-10-CM

## 2021-02-15 DIAGNOSIS — D57.00 SICKLE CELL PAIN CRISIS (H): ICD-10-CM

## 2021-02-15 NOTE — TELEPHONE ENCOUNTER
[facility] Telephone Triage Note    The following symptoms were reported:   Newer   Description:            Onset:  Continues to hurt from last week  Location: Back of neck down spine  Character: Sharp           Intensity: 8/10    Accompanying Signs & Symptoms:  radiation of pain to down back    Chest Pain:  Denies     Shortness of Breath:  denies     Fever:  denies     Chills:  denies   Cough/sore throat:  denies  Other:  denies    Therapies Tried and outcome: all medications that are at home with minimal relief   Oxycodone 10mg taken:  Takes every 6hrs.   This morning taken at 3:00am, 8am  Tylenol/IBUprofen last dose 7:30am.       Improved by: none    The following provider was consulted:  9:39 snehal HIGGINS   Approved labs IVF/Pain- Page Andrei when pt in for quick visit.       Patient instructions and/or follow up:    This writer updated pt that no openings today, educated on options to try calling back tomorrow or to seek care immediately for worsening symptoms, including: uncontrolled pain, fever, chest pain, shortness of breath, dizziness.

## 2021-02-16 ENCOUNTER — PATIENT OUTREACH (OUTPATIENT)
Dept: CARE COORDINATION | Facility: CLINIC | Age: 21
End: 2021-02-16

## 2021-02-16 ENCOUNTER — INFUSION THERAPY VISIT (OUTPATIENT)
Dept: ONCOLOGY | Facility: CLINIC | Age: 21
End: 2021-02-16
Attending: PEDIATRICS
Payer: COMMERCIAL

## 2021-02-16 ENCOUNTER — TELEPHONE (OUTPATIENT)
Dept: ONCOLOGY | Facility: CLINIC | Age: 21
End: 2021-02-16

## 2021-02-16 VITALS
HEART RATE: 114 BPM | DIASTOLIC BLOOD PRESSURE: 77 MMHG | TEMPERATURE: 98.7 F | RESPIRATION RATE: 18 BRPM | OXYGEN SATURATION: 96 % | SYSTOLIC BLOOD PRESSURE: 135 MMHG

## 2021-02-16 DIAGNOSIS — D57.00 SICKLE CELL PAIN CRISIS (H): Primary | ICD-10-CM

## 2021-02-16 PROCEDURE — 258N000003 HC RX IP 258 OP 636: Performed by: PHYSICIAN ASSISTANT

## 2021-02-16 PROCEDURE — 96374 THER/PROPH/DIAG INJ IV PUSH: CPT

## 2021-02-16 PROCEDURE — 250N000011 HC RX IP 250 OP 636: Performed by: PEDIATRICS

## 2021-02-16 PROCEDURE — 250N000011 HC RX IP 250 OP 636: Performed by: PHYSICIAN ASSISTANT

## 2021-02-16 PROCEDURE — 96361 HYDRATE IV INFUSION ADD-ON: CPT

## 2021-02-16 PROCEDURE — 96376 TX/PRO/DX INJ SAME DRUG ADON: CPT

## 2021-02-16 RX ORDER — ONDANSETRON 8 MG/1
8 TABLET, FILM COATED ORAL
Status: CANCELLED
Start: 2021-02-16

## 2021-02-16 RX ORDER — MORPHINE SULFATE 2 MG/ML
2 INJECTION, SOLUTION INTRAMUSCULAR; INTRAVENOUS
Status: COMPLETED | OUTPATIENT
Start: 2021-02-16 | End: 2021-02-16

## 2021-02-16 RX ORDER — DIPHENHYDRAMINE HCL 25 MG
25 CAPSULE ORAL
Status: CANCELLED
Start: 2021-02-16

## 2021-02-16 RX ORDER — MORPHINE SULFATE 2 MG/ML
2 INJECTION, SOLUTION INTRAMUSCULAR; INTRAVENOUS
Status: CANCELLED
Start: 2021-02-16

## 2021-02-16 RX ORDER — HEPARIN SODIUM,PORCINE 10 UNIT/ML
5 VIAL (ML) INTRAVENOUS
Status: CANCELLED | OUTPATIENT
Start: 2021-02-16

## 2021-02-16 RX ORDER — HEPARIN SODIUM (PORCINE) LOCK FLUSH IV SOLN 100 UNIT/ML 100 UNIT/ML
5 SOLUTION INTRAVENOUS
Status: CANCELLED | OUTPATIENT
Start: 2021-02-16

## 2021-02-16 RX ORDER — HEPARIN SODIUM (PORCINE) LOCK FLUSH IV SOLN 100 UNIT/ML 100 UNIT/ML
5 SOLUTION INTRAVENOUS
Status: DISCONTINUED | OUTPATIENT
Start: 2021-02-16 | End: 2021-02-16 | Stop reason: HOSPADM

## 2021-02-16 RX ADMIN — DEXTROSE AND SODIUM CHLORIDE 500 ML: 5; 450 INJECTION, SOLUTION INTRAVENOUS at 11:30

## 2021-02-16 RX ADMIN — MORPHINE SULFATE 2 MG: 2 INJECTION, SOLUTION INTRAMUSCULAR; INTRAVENOUS at 11:30

## 2021-02-16 RX ADMIN — Medication 5 ML: at 14:01

## 2021-02-16 RX ADMIN — MORPHINE SULFATE 2 MG: 2 INJECTION, SOLUTION INTRAMUSCULAR; INTRAVENOUS at 13:32

## 2021-02-16 RX ADMIN — MORPHINE SULFATE 2 MG: 2 INJECTION, SOLUTION INTRAMUSCULAR; INTRAVENOUS at 12:29

## 2021-02-16 ASSESSMENT — PAIN SCALES - GENERAL
PAINLEVEL: SEVERE PAIN (6)
PAINLEVEL: EXTREME PAIN (8)
PAINLEVEL: EXTREME PAIN (9)

## 2021-02-16 NOTE — TELEPHONE ENCOUNTER
Pt scheduled for 11 am [facility] infusion, pt accepted and scheduling messaged. Routed high priority to SW to assist with round trip rides and notify pt once set up. Pt confirmed she is no longer have swollen lymph nodes or sore throat, reason for why she went to the ED on 2/11.

## 2021-02-16 NOTE — PROGRESS NOTES
Infusion Nursing Note:  Jennifer Cervantes presents today for IV fluids and pain medication.    Patient seen by provider today: No   present during visit today: Not Applicable.    Note: Patient arrives to infusion complaining of 9/10 back pain. She says it is typical for her sickle cell pain. IV fluids and pain medications administered per protocol. After 3 doses pain improved to 5/10 and patient felt comfortable to discharge home.     Patient did not meet criteria for an asymptomatic covid-19 PCR test in infusion today.     Intravenous Access:  Implanted Port.    Treatment Conditions:  Not Applicable.      Post Infusion Assessment:  Patient tolerated infusion without incident.  Blood return noted pre and post infusion.  Site patent and intact, free from redness, edema or discomfort.  No evidence of extravasations.  Access discontinued per protocol.       Discharge Plan:   Patient declined prescription refills.  Discharge instructions reviewed with: Patient.  Patient and/or family verbalized understanding of discharge instructions and all questions answered.  AVS to patient via [employer].  Patient will return 2/24/2021 for next ANDRE[facility] appointment.   Patient discharged in stable condition accompanied by: self.  Departure Mode: Ambulatory.    Ally Palmer RN

## 2021-02-16 NOTE — PROGRESS NOTES
Social Work Follow-Up  [facility]    Data/Intervention:  Patient Name:  Jennifer Cervantes  /Age:  2000 (20 year old)    Reason for Follow-Up:  Transportation Assistance    Collaborated With:    -JOE Patel  -[employer]  -Pt    Intervention/Education/Resources Provided:  SW received a message from JOE Patel in triage that pt is needing transportation for infusion appt at 11 am. SW called [employer] and set up a ride for the appt today. [employer] will  pt at 10:15 am and it will be a will call return ride. SW reached out to pt to let them know about their appt and asked if she knew the number for [employer] ([phone]) and they stated they did. SW updated JOE Patel as well.     Assessment/Plan:  SW will continue to remain available as needed.  Previously provided patient/family with writer's contact information and availability.       CARLOS Chavez,LGSW  Hematology/Oncology Social Worker  Phone:[phone] Pager: [phone]

## 2021-02-17 ENCOUNTER — TELEPHONE (OUTPATIENT)
Dept: ONCOLOGY | Facility: CLINIC | Age: 21
End: 2021-02-17

## 2021-02-17 ENCOUNTER — VIRTUAL VISIT (OUTPATIENT)
Dept: PULMONOLOGY | Facility: CLINIC | Age: 21
End: 2021-02-17
Attending: INTERNAL MEDICINE
Payer: COMMERCIAL

## 2021-02-17 ENCOUNTER — INFUSION THERAPY VISIT (OUTPATIENT)
Dept: ONCOLOGY | Facility: CLINIC | Age: 21
End: 2021-02-17
Attending: INTERNAL MEDICINE
Payer: COMMERCIAL

## 2021-02-17 ENCOUNTER — PRE VISIT (OUTPATIENT)
Dept: PULMONOLOGY | Facility: CLINIC | Age: 21
End: 2021-02-17

## 2021-02-17 ENCOUNTER — PATIENT OUTREACH (OUTPATIENT)
Dept: CARE COORDINATION | Facility: CLINIC | Age: 21
End: 2021-02-17

## 2021-02-17 VITALS
SYSTOLIC BLOOD PRESSURE: 128 MMHG | WEIGHT: 159 LBS | TEMPERATURE: 98.5 F | OXYGEN SATURATION: 94 % | BODY MASS INDEX: 27.28 KG/M2 | DIASTOLIC BLOOD PRESSURE: 84 MMHG | RESPIRATION RATE: 18 BRPM | HEART RATE: 122 BPM

## 2021-02-17 DIAGNOSIS — Z86.73 HISTORY OF STROKE: ICD-10-CM

## 2021-02-17 DIAGNOSIS — D57.00 SICKLE CELL PAIN CRISIS (H): ICD-10-CM

## 2021-02-17 DIAGNOSIS — D57.1 HB-SS DISEASE WITHOUT CRISIS (H): Primary | ICD-10-CM

## 2021-02-17 DIAGNOSIS — D57.00 SICKLE CELL CRISIS (H): ICD-10-CM

## 2021-02-17 DIAGNOSIS — Z53.9 ERRONEOUS ENCOUNTER--DISREGARD: Primary | ICD-10-CM

## 2021-02-17 LAB
ALBUMIN SERPL-MCNC: 4 G/DL (ref 3.4–5)
ALP SERPL-CCNC: 89 U/L (ref 40–150)
ALT SERPL W P-5'-P-CCNC: 68 U/L (ref 0–50)
ANION GAP SERPL CALCULATED.3IONS-SCNC: 7 MMOL/L (ref 3–14)
AST SERPL W P-5'-P-CCNC: 56 U/L (ref 0–45)
BASOPHILS # BLD AUTO: 0.1 10E9/L (ref 0–0.2)
BASOPHILS NFR BLD AUTO: 0.8 %
BILIRUB SERPL-MCNC: 3.4 MG/DL (ref 0.2–1.3)
BUN SERPL-MCNC: 9 MG/DL (ref 7–30)
CALCIUM SERPL-MCNC: 9.1 MG/DL (ref 8.5–10.1)
CHLORIDE SERPL-SCNC: 107 MMOL/L (ref 94–109)
CO2 SERPL-SCNC: 23 MMOL/L (ref 20–32)
CREAT SERPL-MCNC: 0.51 MG/DL (ref 0.52–1.04)
DIFFERENTIAL METHOD BLD: ABNORMAL
EOSINOPHIL # BLD AUTO: 0.2 10E9/L (ref 0–0.7)
EOSINOPHIL NFR BLD AUTO: 1 %
ERYTHROCYTE [DISTWIDTH] IN BLOOD BY AUTOMATED COUNT: 20 % (ref 10–15)
GFR SERPL CREATININE-BSD FRML MDRD: >90 ML/MIN/{1.73_M2}
GLUCOSE SERPL-MCNC: 81 MG/DL (ref 70–99)
HCT VFR BLD AUTO: 19 % (ref 35–47)
HGB BLD-MCNC: 6.5 G/DL (ref 11.7–15.7)
IMM GRANULOCYTES # BLD: 0.1 10E9/L (ref 0–0.4)
IMM GRANULOCYTES NFR BLD: 0.5 %
LYMPHOCYTES # BLD AUTO: 2.1 10E9/L (ref 0.8–5.3)
LYMPHOCYTES NFR BLD AUTO: 14.2 %
MCH RBC QN AUTO: 29.1 PG (ref 26.5–33)
MCHC RBC AUTO-ENTMCNC: 34.2 G/DL (ref 31.5–36.5)
MCV RBC AUTO: 85 FL (ref 78–100)
MONOCYTES # BLD AUTO: 1.2 10E9/L (ref 0–1.3)
MONOCYTES NFR BLD AUTO: 8.5 %
NEUTROPHILS # BLD AUTO: 10.9 10E9/L (ref 1.6–8.3)
NEUTROPHILS NFR BLD AUTO: 75 %
NRBC # BLD AUTO: 0.4 10*3/UL
NRBC BLD AUTO-RTO: 3 /100
PLATELET # BLD AUTO: 374 10E9/L (ref 150–450)
POTASSIUM SERPL-SCNC: 3.6 MMOL/L (ref 3.4–5.3)
PROT SERPL-MCNC: 8.8 G/DL (ref 6.8–8.8)
RBC # BLD AUTO: 2.23 10E12/L (ref 3.8–5.2)
RETICS # AUTO: 389.2 10E9/L (ref 25–95)
RETICS/RBC NFR AUTO: 17.5 % (ref 0.5–2)
SODIUM SERPL-SCNC: 137 MMOL/L (ref 133–144)
WBC # BLD AUTO: 14.6 10E9/L (ref 4–11)

## 2021-02-17 PROCEDURE — 250N000011 HC RX IP 250 OP 636: Performed by: PEDIATRICS

## 2021-02-17 PROCEDURE — 85025 COMPLETE CBC W/AUTO DIFF WBC: CPT | Performed by: PHYSICIAN ASSISTANT

## 2021-02-17 PROCEDURE — 96374 THER/PROPH/DIAG INJ IV PUSH: CPT

## 2021-02-17 PROCEDURE — 258N000003 HC RX IP 258 OP 636: Performed by: PHYSICIAN ASSISTANT

## 2021-02-17 PROCEDURE — 96376 TX/PRO/DX INJ SAME DRUG ADON: CPT

## 2021-02-17 PROCEDURE — 85045 AUTOMATED RETICULOCYTE COUNT: CPT | Performed by: PHYSICIAN ASSISTANT

## 2021-02-17 PROCEDURE — 250N000011 HC RX IP 250 OP 636: Performed by: PHYSICIAN ASSISTANT

## 2021-02-17 PROCEDURE — 80053 COMPREHEN METABOLIC PANEL: CPT | Performed by: PHYSICIAN ASSISTANT

## 2021-02-17 PROCEDURE — 96361 HYDRATE IV INFUSION ADD-ON: CPT

## 2021-02-17 RX ORDER — OXYCODONE HYDROCHLORIDE 15 MG/1
15 TABLET ORAL EVERY 6 HOURS PRN
Qty: 60 TABLET | Refills: 0 | Status: SHIPPED | OUTPATIENT
Start: 2021-02-17 | End: 2021-03-03

## 2021-02-17 RX ORDER — DIPHENHYDRAMINE HCL 25 MG
25 CAPSULE ORAL
Status: CANCELLED
Start: 2021-02-17

## 2021-02-17 RX ORDER — HEPARIN SODIUM (PORCINE) LOCK FLUSH IV SOLN 100 UNIT/ML 100 UNIT/ML
5 SOLUTION INTRAVENOUS EVERY 8 HOURS
Status: DISCONTINUED | OUTPATIENT
Start: 2021-02-17 | End: 2021-02-17 | Stop reason: HOSPADM

## 2021-02-17 RX ORDER — MORPHINE SULFATE 2 MG/ML
2 INJECTION, SOLUTION INTRAMUSCULAR; INTRAVENOUS
Status: CANCELLED
Start: 2021-02-17

## 2021-02-17 RX ORDER — MORPHINE SULFATE 2 MG/ML
2 INJECTION, SOLUTION INTRAMUSCULAR; INTRAVENOUS
Status: DISCONTINUED | OUTPATIENT
Start: 2021-02-17 | End: 2021-02-17 | Stop reason: HOSPADM

## 2021-02-17 RX ORDER — ONDANSETRON 8 MG/1
8 TABLET, ORALLY DISINTEGRATING ORAL
Status: DISCONTINUED | OUTPATIENT
Start: 2021-02-17 | End: 2021-02-17 | Stop reason: HOSPADM

## 2021-02-17 RX ORDER — ONDANSETRON 8 MG/1
8 TABLET, FILM COATED ORAL
Status: DISCONTINUED | OUTPATIENT
Start: 2021-02-17 | End: 2021-02-17 | Stop reason: CLARIF

## 2021-02-17 RX ORDER — HEPARIN SODIUM (PORCINE) LOCK FLUSH IV SOLN 100 UNIT/ML 100 UNIT/ML
5 SOLUTION INTRAVENOUS
Status: CANCELLED | OUTPATIENT
Start: 2021-02-17

## 2021-02-17 RX ORDER — HEPARIN SODIUM,PORCINE 10 UNIT/ML
5 VIAL (ML) INTRAVENOUS
Status: CANCELLED | OUTPATIENT
Start: 2021-02-17

## 2021-02-17 RX ORDER — HEPARIN SODIUM (PORCINE) LOCK FLUSH IV SOLN 100 UNIT/ML 100 UNIT/ML
5 SOLUTION INTRAVENOUS
Status: DISCONTINUED | OUTPATIENT
Start: 2021-02-17 | End: 2021-02-17 | Stop reason: HOSPADM

## 2021-02-17 RX ORDER — ONDANSETRON 8 MG/1
8 TABLET, FILM COATED ORAL
Status: CANCELLED
Start: 2021-02-17

## 2021-02-17 RX ORDER — DIPHENHYDRAMINE HCL 25 MG
25 CAPSULE ORAL
Status: DISCONTINUED | OUTPATIENT
Start: 2021-02-17 | End: 2021-02-17 | Stop reason: HOSPADM

## 2021-02-17 RX ADMIN — MORPHINE SULFATE 2 MG: 2 INJECTION, SOLUTION INTRAMUSCULAR; INTRAVENOUS at 14:01

## 2021-02-17 RX ADMIN — Medication 5 ML: at 14:50

## 2021-02-17 RX ADMIN — Medication 5 ML: at 12:31

## 2021-02-17 RX ADMIN — MORPHINE SULFATE 2 MG: 2 INJECTION, SOLUTION INTRAMUSCULAR; INTRAVENOUS at 12:59

## 2021-02-17 RX ADMIN — DEXTROSE AND SODIUM CHLORIDE 500 ML: 5; 450 INJECTION, SOLUTION INTRAVENOUS at 12:56

## 2021-02-17 RX ADMIN — MORPHINE SULFATE 2 MG: 2 INJECTION, SOLUTION INTRAMUSCULAR; INTRAVENOUS at 14:48

## 2021-02-17 ASSESSMENT — PAIN SCALES - GENERAL
PAINLEVEL: SEVERE PAIN (7)
PAINLEVEL: MODERATE PAIN (5)
PAINLEVEL: SEVERE PAIN (6)
PAINLEVEL: EXTREME PAIN (9)
PAINLEVEL: EXTREME PAIN (9)

## 2021-02-17 NOTE — LETTER
"2/17/2021       RE: Jennifer Cervantes  [address]  [place] [address]     Dear Colleague,    Thank you for referring your patient, Jennifer Cervantes, to the [facility] FOR LUNG SCIENCE AND HE[facility]. Please see a copy of my visit note below.    Jennifer is a 20 year old who is being evaluated via a billable video visit.      How would you like to obtain your AVS? MyChart  If the video visit is dropped, the invitation should be resent by: Text to cell phone: [phone]  Will anyone else be joining your video visit? No  {If patient encounters technical issues they should call [phone] :[id number]}    Video Start Time: {video visit start/end time for provider to select:413273}  Video-Visit Details    Type of service:  Video Visit    Video End Time:{video visit start/end time for provider to select:152948}    Originating Location (pt. Location): {video visit patient location:242619::\"Home\"}    Distant Location (provider location):  [facility] FOR LUNG SCIENCE AND [facility]     Platform used for Video Visit: {Virtual Visit Platforms:133039::\"[employer]Tuk[employer]\"}        Again, thank you for allowing me to participate in the care of your patient.      Sincerely,    Rosalva Gasca MD    "

## 2021-02-17 NOTE — TELEPHONE ENCOUNTER
PA Initiation    Medication: Oxycodone - Submitted  Insurance Company: [employer] - Phone [phone] Fax [phone]  Pharmacy Filling the Rx:    Filling Pharmacy Phone:    Filling Pharmacy Fax:    Start Date: 2/17/2021              Caron Martinez CPhT  [facility]  Oncology Pharmacy Liaison  [email or web address]  Phone: [phone]  Fax: [phone]

## 2021-02-17 NOTE — NURSING NOTE
Chief Complaint   Patient presents with     Port Draw     power needle. heparin locked, vitals checked     Elena Mcghee RN on 2/17/2021 at 12:32 PM

## 2021-02-17 NOTE — LETTER
"2/17/2021       RE: Jennifer Cervantes  [address]  [place] [address]     Dear Colleague,    Thank you for referring your patient, Jennifer Cervantes, to the [facility] FOR LUNG SCIENCE AND HEALTH [facility]. Please see a copy of my visit note below.    Jennifer is a 20 year old who is being evaluated via a billable video visit.      How would you like to obtain your AVS? MyChart  If the video visit is dropped, the invitation should be resent by: Text to cell phone: [phone]  Will anyone else be joining your video visit? No  {If patient encounters technical issues they should call [phone] :750704}    Video Start Time: {video visit start/end time for provider to select:137596}  Video-Visit Details    Type of service:  Video Visit    Video End Time:{video visit start/end time for provider to select:152948}    Originating Location (pt. Location): {video visit patient location:822284::\"Home\"}    Distant Location (provider location):  [facility] FOR LUNG SCIENCE AND [facility]     Platform used for Video Visit: {Virtual Visit Platforms:386330::\"[employer]\"}        Again, thank you for allowing me to participate in the care of your patient.      Sincerely,    Rosalva Gasca MD    "

## 2021-02-17 NOTE — PROGRESS NOTES
Social Work Follow-Up  [facility]    Data/Intervention:  Patient Name:  Jennifer Cervantes  /Age:  2000 (20 year old)    Reason for Follow-Up:  Transportation Assistance    Collaborated With:    -JOE Patel  -[employer]  -Pt    Intervention/Education/Resources Provided:  SW received page from JOE Patel that pt has been scheduled for an appointment at 12 pm for pain management. SW called [employer] to set up transportation for pt, but they stated that it was too close to the appointment time as drivers need at least 30 minutes to be dispatched out. SW used taxi voucher and got pt set up for a ride through [employer] ([phone]). SW called pt and relayed this information to them and SW also informed JOE Patel about the transportation.    Assessment/Plan:  SW will remain available as needed.   Previously provided patient/family with writer's contact information and availability.       CARLOS Chavez,LGSW  Hematology/Oncology Social Worker  Phone:[phone] Pager: [phone]

## 2021-02-17 NOTE — PATIENT INSTRUCTIONS
Contact Numbers  [facility]: [phone]    After Hours:  [phone]  Triage: [phone]    Please call the [facility] Triage line if you experience a temperature greater than or equal to 100.5, shaking chills, have uncontrolled nausea, vomiting and/or diarrhea, dizziness, shortness of breath, chest pain, bleeding, unexplained bruising, or if you have any other new/concerning symptoms, questions or concerns.     If it is after hours, weekends, or holidays, please call the main hospital  at  [phone] and ask to speak to the Oncology doctor on call.     If you are having any concerning symptoms or wish to speak to a provider before your next infusion visit, please call your care coordinator or triage to notify them so we can adequately serve you.     If you need a refill on a narcotic prescription or other medication, please call triage before your infusion appointment.       February 2021 Sunday Monday Tuesday Wednesday Thursday Friday Saturday        1    NM LUNG SCAN VENT/PERF   1:00 AM   (90 min.)   UUNM2   [facility]    Admission   9:36 PM   Pedro Tinajero DO   [facility] [place]   (Discharge: 2/3/2021) 2    XR CHEST PORT 1 VIEW   8:10 AM   (20 min.)   [id number]   [facility]    ECHO COMPLETE   8:20 AM   (60 min.)   [id number]   [facility] 3     4     5    VIDEO VISIT RETURN   2:15 PM   (30 min.)   Eric Duncan MD   [facility] 6       7     8     9    [facility] ONC INFUSION 120  12:30 PM   (120 min.)   UC ONCOLOGY INFUSION   [facility] 10    LAB CENTRAL  10:45 AM   (15 min.)   [facility]   [facility] RETURN  10:55 AM   (50 min.)   Andrei Machado PA   [facility] BMT INFUSION 120   1:30 PM   (120 min.)   UC BMT INFUSION     [facility] [place] 11    MR ABDOMEN WO   7:30 AM   (60 min.)   UUMR1   [facility]    MR MYOCARDIUM WO   9:00 AM   (105 min.)   UUMR4   [facility]    VIDEO VISIT NEW   2:45 PM   (30 min.)   Suraj Case MD   [facility] Internal Medicine [place]    Admission   8:29 PM   Artie Taylor MD   [facility] Emergency Department   (Discharge: 2/12/2021) 12     13       14     15     16    [facility] ONC INFUSION 120  11:00 AM   (120 min.)   UC ONCOLOGY INFUSION   [facility] 17    LAB CENTRAL  11:45 AM   (15 min.)   [facility] DRAW   [facility] ONC INFUSION 120  12:30 PM   (120 min.)   UC ONCOLOGY INFUSION   [facility]    VIDEO VISIT NEW   3:20 PM   (80 min.)   Rosalva Gasca MD   [facility] 18     19     20       21     22     23     24    LAB CENTRAL   9:45 AM   (15 min.)   [facility]   [facility] RETURN  10:05 AM   (50 min.)   Andrei Machado PA   [facility] 25     26     27       28                                               March 2021 Sunday Monday Tuesday Wednesday Thursday Friday Saturday        1     2     3     4  Happy Birthday!     5     6       7     8     9     10     11     12     13       14     15     16     17     18     19     20       21     22     23     24     25     26    LAB CENTRAL  12:30 PM   (15 min.)   [facility]   [facility] RETURN  12:45 PM   (30 min.)   Eric Duncan MD   [facility] 27       28     29     30     31                                   Recent Results (from the past 24 hour(s))   Reticulocyte count    Collection Time: 02/17/21 12:17 PM    Result Value Ref Range    % Retic 17.5 (H) 0.5 - 2.0 %    Absolute Retic 389.2 (H) 25 - 95 10e9/L   Comprehensive metabolic panel    Collection Time: 02/17/21 12:17 PM   Result Value Ref Range    Sodium 137 133 - 144 mmol/L    Potassium 3.6 3.4 - 5.3 mmol/L    Chloride 107 94 - 109 mmol/L    Carbon Dioxide 23 20 - 32 mmol/L    Anion Gap 7 3 - 14 mmol/L    Glucose 81 70 - 99 mg/dL    Urea Nitrogen 9 7 - 30 mg/dL    Creatinine 0.51 (L) 0.52 - 1.04 mg/dL    GFR Estimate >90 >60 mL/min/[1.73_m2]    GFR Estimate If Black >90 >60 mL/min/[1.73_m2]    Calcium 9.1 8.5 - 10.1 mg/dL    Bilirubin Total 3.4 (H) 0.2 - 1.3 mg/dL    Albumin 4.0 3.4 - 5.0 g/dL    Protein Total 8.8 6.8 - 8.8 g/dL    Alkaline Phosphatase 89 40 - 150 U/L    ALT 68 (H) 0 - 50 U/L    AST 56 (H) 0 - 45 U/L   *CBC with platelets differential    Collection Time: 02/17/21 12:17 PM   Result Value Ref Range    WBC 14.6 (H) 4.0 - 11.0 10e9/L    RBC Count 2.23 (L) 3.8 - 5.2 10e12/L    Hemoglobin 6.5 (LL) 11.7 - 15.7 g/dL    Hematocrit 19.0 (L) 35.0 - 47.0 %    MCV 85 78 - 100 fl    MCH 29.1 26.5 - 33.0 pg    MCHC 34.2 31.5 - 36.5 g/dL    RDW 20.0 (H) 10.0 - 15.0 %    Platelet Count 374 150 - 450 10e9/L    Diff Method Automated Method     % Neutrophils 75.0 %    % Lymphocytes 14.2 %    % Monocytes 8.5 %    % Eosinophils 1.0 %    % Basophils 0.8 %    % Immature Granulocytes 0.5 %    Nucleated RBCs 3 (H) 0 /100    Absolute Neutrophil 10.9 (H) 1.6 - 8.3 10e9/L    Absolute Lymphocytes 2.1 0.8 - 5.3 10e9/L    Absolute Monocytes 1.2 0.0 - 1.3 10e9/L    Absolute Eosinophils 0.2 0.0 - 0.7 10e9/L    Absolute Basophils 0.1 0.0 - 0.2 10e9/L    Abs Immature Granulocytes 0.1 0 - 0.4 10e9/L    Absolute Nucleated RBC 0.4

## 2021-02-17 NOTE — PROGRESS NOTES
"Infusion Nursing Note:  Jennifer Cervantes presents today for IVF-Pain meds.    Patient seen by provider today: No   present during visit today: Not Applicable.    Note:   General: patient appears well in no acute distress, alert and oriented, speech clear , Negative for chills/fevers and fatigue. Grade 0  Eyes/Ears/Nose/Throat: no erythema or discharge noted.  Negative for sore throat. Negative for pain and visual disturbance Grade 0  Skin: no visualized rash or lesions on visualized skin. Grade 0  Resp: Appears to be breathing comfortably without accessory muscle usage, speaking in full sentences, no cough or audible wheeze . Negative for cough, chest tightness and shortness of breath. Grade 0 HX of asthma   Neuro/Musk/Skel: facial movement symmetric, no noted tremors, Normal ROM, moving extremities freely. Negative for dizziness, weakness and headaches Grade 0  Psych: able to articulate logical thoughts,  Affect is normal. Negative for confusion Grade 0  Emotional:WNL Grade 0 HX of depression/anxiety  Cardiovascular:HX of CVA and PE. Negative for chest pain and palpitations. Grade 0   GI/Nutrition:WNL. Negative for abdominal distention, abdominal pain, constipation, diarrhea, nausea and vomiting. Grade 0   Genitourinary:WNL. Negative for difficulty urinating, dysuria, frequency and urgency. Grade 0  Endocrine:WNL Grade 0  Mobility/Fall Risk:WNL Grade 0    Pain level:9/10 in bilateral sides and lower back, 5/10 after Morphine x 3 and IVF.  Pt reporting it istolerable and would like to go home    Pt declined PRN Benadryl and Zofran.    TORB 2/17/21 @1300 Andrei Coronado RN  --Change blood tx parameter to \"Call Provider if HGB<7.0 to determine if pt needs a transfusion\"  --Pt ok with HGB:6.5 not to have blood TX  --Ok to follow up as scheduled on 2/24 as scheduled      Intravenous Access:  Implanted Port.    Treatment Conditions:  Lab Results   Component Value Date    HGB 6.5 " 02/17/2021     Lab Results   Component Value Date    WBC 14.6 02/17/2021      Lab Results   Component Value Date    ANEU 10.9 02/17/2021     Lab Results   Component Value Date     02/17/2021      Lab Results   Component Value Date     02/17/2021                   Lab Results   Component Value Date    POTASSIUM 3.6 02/17/2021           Lab Results   Component Value Date    MAG 2.1 02/02/2021            Lab Results   Component Value Date    CR 0.51 02/17/2021                   Lab Results   Component Value Date    MICAH 9.1 02/17/2021                Lab Results   Component Value Date    BILITOTAL 3.4 02/17/2021           Lab Results   Component Value Date    ALBUMIN 4.0 02/17/2021                    Lab Results   Component Value Date    ALT 68 02/17/2021           Lab Results   Component Value Date    AST 56 02/17/2021           Post Infusion Assessment:  Patient tolerated infusion without incident.  Blood return noted pre and post infusion.  Site patent and intact, free from redness, edema or discomfort.  No evidence of extravasations.  Access discontinued per protocol.       Discharge Plan:   Prescription refills given for Oxycodone.  Discharge instructions reviewed with: Patient.  Patient and/or family verbalized understanding of discharge instructions and all questions answered.  Copy of AVS reviewed with patient and/or family.  Patient will return 2/24 for provider visit.  Patient discharged in stable condition accompanied by: self.  Departure Mode: Ambulatory.    Patricia Coronado RN

## 2021-02-17 NOTE — TELEPHONE ENCOUNTER
[facility] Telephone Triage Note    The following symptoms were reported:     Description:            Onset:  Last night  Location: Lower back and sides  Character: Typical sharp pain           Intensity: 8.5/10    Accompanying Signs & Symptoms:  denies    Chest Pain:  denies     Shortness of Breath:  denies     Fever:  denies     Chills:  denies   Cough/sore throat:  denies  Other:  Denies other symptoms    Therapies Tried and outcome: Oxy, Ibuprofen, tylenol, celebrex.   Took Oxycodone:   2/16 at 8:00pm 2 tablets  2/17 at 7:00am 1 tablets    Improved by: Some improvement with oxycodone    The following provider was consulted:  9:31 paged Andrei HIGGINS . Labs and IVF/Pain    The following advice/orders were given, and/or interventions recommended:  Pt is aware oxycodone refill sent to clinic pharmacy and awaiting for labs IVF/Pain infusion to open up.      Patient instructions and/or follow up:  Labs at 12 pm, infusion 1230. SW paged to assist with rides through insurance, pt informed she will get a call confirming ride  time, Rx signed to pharmacy and she can  when she's here, pt accepted, scheduling messaged.      Narcotic Refill Request    Date of most recent appointment:  2/10/21  Next upcoming appointment:   2/24     Medication(s) requested:  Oxycodone 10mg  Quantity:  60 tablets  Route: oral   Last fill date and by whom:  Dr. Duncan on 2/3/21  Person requesting refill:  Jennifer MLOINA Reviewed: Not an agent    How often is pt using? Using 1 tablet Every 6 hours  Does pt have enough for today?  Only has 2 tablets  How many days left?none  What pain is the medication treating: Sickle Cell pain  Is pain being adequately controlled on the current regimen?: okay  Experiencing any side effects from medication?: denies

## 2021-02-17 NOTE — LETTER
"2/17/2021      RE: Jennifer NEWMAN Billy  [address]  [place] [address]       Jennifer is a 20 year old who is being evaluated via a billable video visit.      How would you like to obtain your AVS? MyChart  If the video visit is dropped, the invitation should be resent by: Text to cell phone: [phone]  Will anyone else be joining your video visit? No  {If patient encounters technical issues they should call [phone] :[id number]}    Video Start Time: {video visit start/end time for provider to select:528104}  Video-Visit Details    Type of service:  Video Visit    Video End Time:{video visit start/end time for provider to select:813105}    Originating Location (pt. Location): {video visit patient location:174042::\"Home\"}    Distant Location (provider location):  [facility] FOR LUNG SCIENCE AND [facility]     Platform used for Video Visit: {Virtual Visit Platforms:505439::\"[employer]\"}        Rosalva Gasca MD    "

## 2021-02-17 NOTE — TELEPHONE ENCOUNTER
Prior Authorization Approval    Authorization Effective Date: 2/17/2021  Authorization Expiration Date: 2/17/2022  Medication: Oxycodone - APPROVED  Approved Dose/Quantity:   Reference #:     Insurance Company: [id number] - Phone [phone] Fax [phone]  Expected CoPay:       CoPay Card Available:      Foundation Assistance Needed:    Which Pharmacy is filling the prescription (Not needed for infusion/clinic administered):    Pharmacy Notified:    Patient Notified:          Caron Martinez CPhT  [facility]  Oncology Pharmacy Liaison  [email or web address]  Phone: [phone]  Fax: [phone]

## 2021-02-17 NOTE — LETTER
2/17/2021         RE: Jennifer Cervantes  [address]  [place] [address]        Dear Colleague,    Thank you for referring your patient, Jennifer Cervantes, to the [facility]. Please see a copy of my visit note below.      This encounter was opened in error. Please disregard.      Again, thank you for allowing me to participate in the care of your patient.        Sincerely,        [facility] Lab Draw

## 2021-02-17 NOTE — Clinical Note
"    2/17/2021         RE: Jennifer Cervantes  [address]  [place] [address]        Dear Colleague,    Thank you for referring your patient, Jennifer Cervantes, to the [facility] L[facility] AND [email or web address] Please see a copy of my visit note below.    Jennifer is a 20 year old who is being evaluated via a billable video visit.      How would you like to obtain your AVS? MyChart  If the video visit is dropped, the invitation should be resent by: Text to cell phone: [phone]  Will anyone else be joining your video visit? No  {If patient encounters technical issues they should call [phone] :[phone]}    Video Start Time: {video visit start/end time for provider to select:152948}  Video-Visit Details    Type of service:  Video Visit    Video End Time:{video visit start/end time for provider to select:152948}    Originating Location (pt. Location): {video visit patient location:383114::\"Home\"}    Distant Location (provider location):  [facility] LUNG SCIENCE [facility]     Platform used for Video Visit: {Virtual Visit Platforms:763144::\"[place] [employer]\"}        Again, thank you for allowing me to participate in the care of your patient.        Sincerely,        Rosalva Gasca MD  "

## 2021-02-17 NOTE — PROGRESS NOTES
"Jennifer is a 20 year old who is being evaluated via a billable video visit.      How would you like to obtain your AVS? MyChart  If the video visit is dropped, the invitation should be resent by: Text to cell phone: [phone]  Will anyone else be joining your video visit? No  {If patient encounters technical issues they should call [phone] :255692}    Video Start Time: {video visit start/end time for provider to select:152948}  Video-Visit Details    Type of service:  Video Visit    Video End Time:{video visit start/end time for provider to select:152948}    Originating Location (pt. Location): {video visit patient location:316041::\"Home\"}    Distant Location (provider location):  [facility] LUNG SCIENCE AND [facility]     Platform used for Video Visit: {Virtual Visit Platforms:344291::\"[employer]\"}    "

## 2021-02-17 NOTE — LETTER
"2/17/2021      RE: Jennifer NEWMAN Billy  [address]  [place] [address]       Jennifer is a 20 year old who is being evaluated via a billable video visit.      How would you like to obtain your AVS? MyChart  If the video visit is dropped, the invitation should be resent by: Text to cell phone: [phone]  Will anyone else be joining your video visit? No  {If patient encounters technical issues they should call [phone] :[id number]}    Video Start Time: {video visit start/end time for provider to select:275334}  Video-Visit Details    Type of service:  Video Visit    Video End Time:{video visit start/end time for provider to select:551354}    Originating Location (pt. Location): {video visit patient location:271406::\"Home\"}    Distant Location (provider location):  [facility] FOR LUNG SCIENCE AND [facility]     Platform used for Video Visit: {Virtual Visit Platforms:221943::\"[employer]\"}        Rosalva Gasca MD    "

## 2021-02-18 ENCOUNTER — APPOINTMENT (OUTPATIENT)
Dept: GENERAL RADIOLOGY | Facility: CLINIC | Age: 21
End: 2021-02-18
Attending: EMERGENCY MEDICINE
Payer: COMMERCIAL

## 2021-02-18 ENCOUNTER — NURSE TRIAGE (OUTPATIENT)
Dept: NURSING | Facility: CLINIC | Age: 21
End: 2021-02-18

## 2021-02-18 ENCOUNTER — APPOINTMENT (OUTPATIENT)
Dept: CARDIOLOGY | Facility: CLINIC | Age: 21
End: 2021-02-18
Attending: EMERGENCY MEDICINE
Payer: COMMERCIAL

## 2021-02-18 ENCOUNTER — TELEPHONE (OUTPATIENT)
Dept: ONCOLOGY | Facility: CLINIC | Age: 21
End: 2021-02-18

## 2021-02-18 ENCOUNTER — HOSPITAL ENCOUNTER (EMERGENCY)
Facility: CLINIC | Age: 21
Discharge: HOME | End: 2021-02-18
Attending: EMERGENCY MEDICINE | Admitting: EMERGENCY MEDICINE
Payer: COMMERCIAL

## 2021-02-18 VITALS
TEMPERATURE: 98.4 F | RESPIRATION RATE: 18 BRPM | WEIGHT: 162 LBS | SYSTOLIC BLOOD PRESSURE: 128 MMHG | HEIGHT: 64 IN | OXYGEN SATURATION: 100 % | BODY MASS INDEX: 27.66 KG/M2 | DIASTOLIC BLOOD PRESSURE: 77 MMHG | HEART RATE: 118 BPM

## 2021-02-18 DIAGNOSIS — R07.9 CHEST PAIN, UNSPECIFIED TYPE: ICD-10-CM

## 2021-02-18 DIAGNOSIS — D57.00 SICKLE CELL PAIN CRISIS (H): ICD-10-CM

## 2021-02-18 LAB
ABO + RH BLD: NORMAL
ABO + RH BLD: NORMAL
ANION GAP SERPL CALCULATED.3IONS-SCNC: 5 MMOL/L (ref 3–14)
BASOPHILS # BLD AUTO: 0.2 10E9/L (ref 0–0.2)
BASOPHILS NFR BLD AUTO: 1.1 %
BLD GP AB SCN SERPL QL: NORMAL
BLD PROD TYP BPU: NORMAL
BLD PROD TYP BPU: NORMAL
BLD UNIT ID BPU: 0
BLOOD BANK CMNT PATIENT-IMP: NORMAL
BLOOD PRODUCT CODE: NORMAL
BPU ID: NORMAL
BUN SERPL-MCNC: 7 MG/DL (ref 7–30)
CALCIUM SERPL-MCNC: 8.8 MG/DL (ref 8.5–10.1)
CHLORIDE SERPL-SCNC: 110 MMOL/L (ref 94–109)
CO2 SERPL-SCNC: 24 MMOL/L (ref 20–32)
CREAT SERPL-MCNC: 0.49 MG/DL (ref 0.52–1.04)
DIFFERENTIAL METHOD BLD: ABNORMAL
EOSINOPHIL # BLD AUTO: 0.4 10E9/L (ref 0–0.7)
EOSINOPHIL NFR BLD AUTO: 2.6 %
ERYTHROCYTE [DISTWIDTH] IN BLOOD BY AUTOMATED COUNT: 20.6 % (ref 10–15)
GFR SERPL CREATININE-BSD FRML MDRD: >90 ML/MIN/{1.73_M2}
GLUCOSE SERPL-MCNC: 84 MG/DL (ref 70–99)
HCG SERPL QL: NEGATIVE
HCT VFR BLD AUTO: 17.8 % (ref 35–47)
HGB BLD-MCNC: 6.1 G/DL (ref 11.7–15.7)
IMM GRANULOCYTES # BLD: 0.1 10E9/L (ref 0–0.4)
IMM GRANULOCYTES NFR BLD: 0.8 %
INTERPRETATION ECG - MUSE: NORMAL
INTERPRETATION ECG - MUSE: NORMAL
LYMPHOCYTES # BLD AUTO: 2.4 10E9/L (ref 0.8–5.3)
LYMPHOCYTES NFR BLD AUTO: 17.6 %
MCH RBC QN AUTO: 29.2 PG (ref 26.5–33)
MCHC RBC AUTO-ENTMCNC: 34.3 G/DL (ref 31.5–36.5)
MCV RBC AUTO: 85 FL (ref 78–100)
MONOCYTES # BLD AUTO: 1.6 10E9/L (ref 0–1.3)
MONOCYTES NFR BLD AUTO: 11.5 %
NEUTROPHILS # BLD AUTO: 9 10E9/L (ref 1.6–8.3)
NEUTROPHILS NFR BLD AUTO: 66.4 %
NRBC # BLD AUTO: 0.5 10*3/UL
NRBC BLD AUTO-RTO: 3 /100
NUM BPU REQUESTED: 1
PLATELET # BLD AUTO: 400 10E9/L (ref 150–450)
POTASSIUM SERPL-SCNC: 3.2 MMOL/L (ref 3.4–5.3)
RBC # BLD AUTO: 2.09 10E12/L (ref 3.8–5.2)
RETICS # AUTO: 419 10E9/L (ref 25–95)
RETICS/RBC NFR AUTO: 20.1 % (ref 0.5–2)
SODIUM SERPL-SCNC: 138 MMOL/L (ref 133–144)
SPECIMEN EXP DATE BLD: NORMAL
TRANSFUSION STATUS PATIENT QL: NORMAL
TRANSFUSION STATUS PATIENT QL: NORMAL
TROPONIN I SERPL-MCNC: <0.015 UG/L (ref 0–0.04)
TROPONIN I SERPL-MCNC: <0.015 UG/L (ref 0–0.04)
WBC # BLD AUTO: 13.5 10E9/L (ref 4–11)

## 2021-02-18 PROCEDURE — 93005 ELECTROCARDIOGRAM TRACING: CPT | Performed by: EMERGENCY MEDICINE

## 2021-02-18 PROCEDURE — 84484 ASSAY OF TROPONIN QUANT: CPT | Performed by: EMERGENCY MEDICINE

## 2021-02-18 PROCEDURE — 99285 EMERGENCY DEPT VISIT HI MDM: CPT | Mod: 25 | Performed by: EMERGENCY MEDICINE

## 2021-02-18 PROCEDURE — 71046 X-RAY EXAM CHEST 2 VIEWS: CPT

## 2021-02-18 PROCEDURE — 93005 ELECTROCARDIOGRAM TRACING: CPT | Mod: 76 | Performed by: EMERGENCY MEDICINE

## 2021-02-18 PROCEDURE — 86923 COMPATIBILITY TEST ELECTRIC: CPT | Performed by: EMERGENCY MEDICINE

## 2021-02-18 PROCEDURE — 85045 AUTOMATED RETICULOCYTE COUNT: CPT | Performed by: EMERGENCY MEDICINE

## 2021-02-18 PROCEDURE — 250N000011 HC RX IP 250 OP 636: Performed by: EMERGENCY MEDICINE

## 2021-02-18 PROCEDURE — 96361 HYDRATE IV INFUSION ADD-ON: CPT | Performed by: EMERGENCY MEDICINE

## 2021-02-18 PROCEDURE — 96374 THER/PROPH/DIAG INJ IV PUSH: CPT | Performed by: EMERGENCY MEDICINE

## 2021-02-18 PROCEDURE — 36430 TRANSFUSION BLD/BLD COMPNT: CPT

## 2021-02-18 PROCEDURE — 86850 RBC ANTIBODY SCREEN: CPT | Performed by: EMERGENCY MEDICINE

## 2021-02-18 PROCEDURE — 93010 ELECTROCARDIOGRAM REPORT: CPT | Mod: 76 | Performed by: EMERGENCY MEDICINE

## 2021-02-18 PROCEDURE — 85025 COMPLETE CBC W/AUTO DIFF WBC: CPT | Performed by: EMERGENCY MEDICINE

## 2021-02-18 PROCEDURE — 86902 BLOOD TYPE ANTIGEN DONOR EA: CPT | Performed by: EMERGENCY MEDICINE

## 2021-02-18 PROCEDURE — 80048 BASIC METABOLIC PNL TOTAL CA: CPT | Performed by: EMERGENCY MEDICINE

## 2021-02-18 PROCEDURE — 93010 ELECTROCARDIOGRAM REPORT: CPT | Performed by: EMERGENCY MEDICINE

## 2021-02-18 PROCEDURE — 96376 TX/PRO/DX INJ SAME DRUG ADON: CPT | Performed by: EMERGENCY MEDICINE

## 2021-02-18 PROCEDURE — 93306 TTE W/DOPPLER COMPLETE: CPT | Mod: 26 | Performed by: STUDENT IN AN ORGANIZED HEALTH CARE EDUCATION/TRAINING PROGRAM

## 2021-02-18 PROCEDURE — 86900 BLOOD TYPING SEROLOGIC ABO: CPT | Performed by: EMERGENCY MEDICINE

## 2021-02-18 PROCEDURE — 258N000003 HC RX IP 258 OP 636: Performed by: EMERGENCY MEDICINE

## 2021-02-18 PROCEDURE — 93306 TTE W/DOPPLER COMPLETE: CPT

## 2021-02-18 PROCEDURE — 84703 CHORIONIC GONADOTROPIN ASSAY: CPT | Performed by: EMERGENCY MEDICINE

## 2021-02-18 PROCEDURE — 71046 X-RAY EXAM CHEST 2 VIEWS: CPT | Mod: 26 | Performed by: RADIOLOGY

## 2021-02-18 PROCEDURE — P9016 RBC LEUKOCYTES REDUCED: HCPCS | Performed by: EMERGENCY MEDICINE

## 2021-02-18 PROCEDURE — 86901 BLOOD TYPING SEROLOGIC RH(D): CPT | Performed by: EMERGENCY MEDICINE

## 2021-02-18 PROCEDURE — 250N000013 HC RX MED GY IP 250 OP 250 PS 637: Performed by: EMERGENCY MEDICINE

## 2021-02-18 RX ORDER — HEPARIN SODIUM (PORCINE) LOCK FLUSH IV SOLN 100 UNIT/ML 100 UNIT/ML
5 SOLUTION INTRAVENOUS
Status: DISCONTINUED | OUTPATIENT
Start: 2021-02-18 | End: 2021-02-18 | Stop reason: HOSPADM

## 2021-02-18 RX ORDER — SODIUM CHLORIDE, SODIUM LACTATE, POTASSIUM CHLORIDE, CALCIUM CHLORIDE 600; 310; 30; 20 MG/100ML; MG/100ML; MG/100ML; MG/100ML
INJECTION, SOLUTION INTRAVENOUS ONCE
Status: COMPLETED | OUTPATIENT
Start: 2021-02-18 | End: 2021-02-18

## 2021-02-18 RX ORDER — MORPHINE SULFATE 4 MG/ML
2 INJECTION, SOLUTION INTRAMUSCULAR; INTRAVENOUS
Status: COMPLETED | OUTPATIENT
Start: 2021-02-18 | End: 2021-02-18

## 2021-02-18 RX ORDER — OXYCODONE HYDROCHLORIDE 5 MG/1
15 TABLET ORAL ONCE
Status: COMPLETED | OUTPATIENT
Start: 2021-02-18 | End: 2021-02-18

## 2021-02-18 RX ADMIN — SODIUM CHLORIDE, POTASSIUM CHLORIDE, SODIUM LACTATE AND CALCIUM CHLORIDE: 600; 310; 30; 20 INJECTION, SOLUTION INTRAVENOUS at 09:20

## 2021-02-18 RX ADMIN — Medication 5 ML: at 18:32

## 2021-02-18 RX ADMIN — MORPHINE SULFATE 2 MG: 4 INJECTION, SOLUTION INTRAMUSCULAR; INTRAVENOUS at 10:57

## 2021-02-18 RX ADMIN — MORPHINE SULFATE 2 MG: 4 INJECTION, SOLUTION INTRAMUSCULAR; INTRAVENOUS at 09:19

## 2021-02-18 RX ADMIN — MORPHINE SULFATE 2 MG: 4 INJECTION, SOLUTION INTRAMUSCULAR; INTRAVENOUS at 12:57

## 2021-02-18 RX ADMIN — OXYCODONE HYDROCHLORIDE 15 MG: 5 TABLET ORAL at 18:12

## 2021-02-18 ASSESSMENT — ENCOUNTER SYMPTOMS
FEVER: 0
VOMITING: 0
COUGH: 0
DIARRHEA: 0
NAUSEA: 0

## 2021-02-18 ASSESSMENT — MIFFLIN-ST. JEOR: SCORE: 1484.83

## 2021-02-18 NOTE — TELEPHONE ENCOUNTER
Pt called in to triage requesting IVF pain meds for sides, lower back, chest pain (not usual for crisis per pt) rated 9/10 since 9 pm last night. Stated last took prn Oxy 15 mg, Tylenol and Ibuprofen at 6 am this morning without relief. Denied any fevers, chills, cough, sob or other symptoms. Feels cold and more fatigued than usual even with warmer rooms at home.    Pt's last infusion was 2/17, last clinic visit 2/5 with follow-up on 2/24 with Andrei HIGGINS. Pt denied being out of home medications and needing any refills today. Will need ride assistance. Paged Dr. Duncan.

## 2021-02-18 NOTE — DISCHARGE INSTRUCTIONS
Thank you for coming to the [facility] Emergency Department.     Please follow up with your Hematology team in the next few days.     Return to the ER if chest pain or shortness of breath become severe, or if you get a new cough or fever.

## 2021-02-18 NOTE — TELEPHONE ENCOUNTER
Per Dr. Duncan: recommend ED visit for new chest pain, does not want pt to wait til later. Pt informed of above she verbalized understanding and stated she will call insurance for urgent same day ride to ED.

## 2021-02-18 NOTE — ED TRIAGE NOTES
Sickle cell back/joint pain starting last night at 9pm. Reports chest pain also started this AM at 5am.

## 2021-02-18 NOTE — ED PROVIDER NOTES
[facility] ([facility])  February 18, 2021  History     Chief Complaint   Patient presents with     Sickle Cell Pain Crisis     HPI  Jennifer Cervantes is a 20 year old female with a PMH of sickle cell anemia, sickle cell pain crisis, cerebral infarction, hemiplegia and hemiparesis affecting right dominant side following cerebral infarction, multiple subsegmental PE, asthma, iron overload, asthma, ASCUS of cervix, cognitive developmental delay, ODD, panic disorder, depression and emergency care plan in place who presents the ED today complaining of sickle cell pain crisis and chest pain. She been having increased sickle cell pain for the last 3 days.  Has been at the infusion center for fluids and pain medicine IV for last 2 days.  Today she noticed at about 5AM left-sided chest pain that radiates to her back.  She has a rather recent diagnosis of pulmonary emboli discovered on a visit on February 1.  She is anticoagulated on Xarelto and is currently on the loading dose 15 mg twice daily.  She last took a dose this morning.  She has not missed or skipped any doses.  Current chest pain is not associated with dyspnea.  No cough.  No fever.  No associated nausea, vomiting, or diarrhea.  No rash.  She states that the pain has been constant since it began and has not had waxing, waning, or exertional component.  No new leg swelling or calf pain.  Last lower extremity venous Dopplers performed 5/20/2020 of the left side. Last echo 1/31/2021 with globally normal LV function.    Patient was recently seen here in the ED on 2/11/2021 for sickle cell pain crisis.  She was treated via her care plan.  Covid and flu tests were negative.    EXAM: XR CHEST PORT 1 VW  2/2/2021 9:26 AM   HISTORY:  increasing leukocytosis, chest pain in sickle cell patient    COMPARISON:  2/1/2021  IMPRESSION: No focal airspace disease.    I have reviewed the Medications, Allergies, Past Medical and Surgical History, and  Social History in the Epic system.  PAST MEDICAL HISTORY:   Past Medical History:   Diagnosis Date     Anemia      Anxiety      Bleeding disorder (H)      Blood clotting disorder (H)      Cerebral infarction (H) 2015     Cognitive developmental delay     low IQ. 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 (H)     right hand contractures     Iron overload due to repeated red blood cell transfusions      Migraines      Multiple subsegmental pulmonary emboli without acute cor pulmonale (H) 02/01/2021     Oppositional defiant behavior      Uncomplicated asthma        PAST SURGICAL HISTORY:   Past Surgical History:   Procedure Laterality Date     AS INSERT TUNNELED CV 2 CATH W/O PORT/PUMP       C BREAST REDUCTION (INCLUDES LIPO) TIER 3 Bilateral 04/23/2019     CHOLECYSTECTOMY       IR CVC NON TUNNEL PLACEMENT  04/07/2020     REPAIR TENDON ELBOW Right 10/02/2019    Procedure: Right Elbow Flexor Lengthening, Flexor Pronator Slide Of Wrist and Finger, Thumb Adductor Lengthening;  Surgeon: Anai Franco MD;  Location: [facility] OR     TONSILLECTOMY Bilateral 10/02/2019    Procedure: Bilateral Tonsillectomy;  Surgeon: Farhana Guy MD;  Location: [facility] OR       Past medical history, past surgical history, medications, and allergies were reviewed with the patient. Additional pertinent items: None    FAMILY HISTORY:   Family History   Problem Relation Age of Onset     Sickle Cell Trait Mother      Hypertension Mother      Asthma Mother      Sickle Cell Trait Father        SOCIAL HISTORY:   Social History     Tobacco Use     Smoking status: Never Smoker     Smokeless tobacco: Never Used   Substance Use Topics     Alcohol use: Not Currently     Alcohol/week: 0.0 standard drinks     Social history was reviewed with the patient. Additional pertinent items: None      Patient's Medications    New Prescriptions    No medications on file   Previous Medications    ACETAMINOPHEN (TYLENOL) 325 MG TABLET    Take 2 tablets (650 mg) by mouth every 6 hours as needed for mild pain    ALBUTEROL (PROAIR HFA/PROVENTIL HFA/VENTOLIN HFA) 108 (90 BASE) MCG/ACT INHALER    Inhale 2 puffs into the lungs every 6 hours as needed for shortness of breath / dyspnea or wheezing    ALBUTEROL (PROVENTIL) (2.5 MG/3ML) 0.083% NEB SOLUTION    Take 1 vial (2.5 mg) by nebulization every 6 hours as needed for shortness of breath / dyspnea or wheezing    ASPIRIN (ASPIRIN) 81 MG EC TABLET    Take 1 tablet (81 mg) by mouth daily . HOLD while on Xarelto    BUDESONIDE-FORMOTEROL (SYMBICORT) 160-4.5 MCG/ACT INHALER    Inhale 2 puffs into the lungs 2 times daily    CELECOXIB (CELEBREX) 100 MG CAPSULE    Take 1 capsule (100 mg) by mouth 2 times daily    DIPHENHYDRAMINE (BENADRYL) 25 MG CAPSULE    Take 1-2 capsules (25-50 mg) by mouth nightly as needed for sleep    HYDROXYUREA 1000 MG TABS    Take 2,000 mg by mouth daily    JADENU 360 MG TABLET    Take 4 tablets (1,440 mg) by mouth daily    MEDROXYPROGESTERONE (DEPO-PROVERA) 150 MG/ML IM INJECTION    Inject 150 mg into the muscle    NALOXONE (NARCAN) 4 MG/0.1ML NASAL SPRAY    Spray 1 spray (4 mg) into one nostril alternating nostrils once as needed for opioid reversal every 2-3 minutes until assistance arrives    ONDANSETRON (ZOFRAN) 8 MG TABLET        OXYCODONE IR (ROXICODONE) 15 MG TABLET    Take 1 tablet (15 mg) by mouth every 6 hours as needed for severe pain    RIVAROXABAN ANTICOAGULANT (XARELTO) 15 MG TABS TABLET    Take 1 tablet (15 mg) by mouth 2 times daily (with meals) For 21 days    RIVAROXABAN ANTICOAGULANT (XARELTO) 20 MG TABS TABLET    Take 1 tablet (20 mg) by mouth daily (with dinner)    SERTRALINE (ZOLOFT) 100 MG TABLET    Take 2 tablets (200 mg) by mouth daily   Modified Medications    No medications on file   Discontinued Medications    No medications on file         "  Allergies   Allergen Reactions     Fish-Derived Products Hives     Seafood Hives     Contrast Dye      Diagnostic X-Ray Materials      Gadolinium         Review of Systems   Constitutional: Negative for fever.   Respiratory: Negative for cough.    Cardiovascular: Positive for chest pain (left-sided).   Gastrointestinal: Negative for diarrhea, nausea and vomiting.   Skin: Negative for rash.   All other systems reviewed and are negative.    A complete review of systems was performed with pertinent positives and negatives noted in the HPI, and all other systems negative.    Physical Exam   BP: 126/72  Pulse: 108  Temp: 98.2  F (36.8  C)  Resp: 16  Height: 162.6 cm (5' 4\")  Weight: 73.5 kg (162 lb)  SpO2: 98 %      Physical Exam   Gen:A&Ox3, no acute distress, uncomfortable.   HEENT:PERRL, no facial tenderness or wounds, head atraumatic  Back: no CVA tenderness  CV:RRR without murmurs  PULM:Clear to auscultation bilaterally  Abd:soft, nontender, nondistended. Bowel sounds present and normal  UE:No traumatic injuries, skin normal  LE:no traumatic injuries, skin normal, no LE edema, right LE chronic deformity  Neuro:CN II-XII intact, strength 5/5 throughout UE, 4+/5 in right foot and 5/5 in the rest of the LE (chronic sequelae of stroke)  Skin: no rashes or ecchymoses    ED Course   9:18 AM  The patient was seen and examined by Cornelia Malloy MD in Room ED05.        Procedures             EKG Interpretation:      Interpreted by Cornelia Malloy MD  Time reviewed: 904  Symptoms at time of EKG: chest pain  Rhythm: normal sinus   Rate: 97  Axis: normal  Ectopy: none  Conduction: normal  ST Segments/ T Waves: No ST-T wave changes  Q Waves: none  Comparison to prior: Unchanged from Feb. 1, 2021    Clinical Impression: normal EKG         EKG Interpretation:      Interpreted by Cornelia Malloy MD  Time reviewed: 1143  Symptoms at time of EKG: Chest pain  Rhythm: Normal sinus   Rate: 100 bpm  Axis: Normal  Ectopy: " None  Conduction: Normal  ST Segments/ T Waves: No ST-T wave changes  Q Waves: None  Comparison to prior: Unchanged from previous on 2/1/2021    Clinical Impression: Unchanged from previous.  No acute ischemic changes.      Results for orders placed or performed during the hospital encounter of 02/18/21 (from the past 24 hour(s))   EKG 12-lead, tracing only   Result Value Ref Range    Interpretation ECG Click View Image link to view waveform and result    CBC with platelets differential   Result Value Ref Range    WBC 13.5 (H) 4.0 - 11.0 10e9/L    RBC Count 2.09 (L) 3.8 - 5.2 10e12/L    Hemoglobin 6.1 (LL) 11.7 - 15.7 g/dL    Hematocrit 17.8 (L) 35.0 - 47.0 %    MCV 85 78 - 100 fl    MCH 29.2 26.5 - 33.0 pg    MCHC 34.3 31.5 - 36.5 g/dL    RDW 20.6 (H) 10.0 - 15.0 %    Platelet Count 400 150 - 450 10e9/L    Diff Method Automated Method     % Neutrophils 66.4 %    % Lymphocytes 17.6 %    % Monocytes 11.5 %    % Eosinophils 2.6 %    % Basophils 1.1 %    % Immature Granulocytes 0.8 %    Nucleated RBCs 3 (H) 0 /100    Absolute Neutrophil 9.0 (H) 1.6 - 8.3 10e9/L    Absolute Lymphocytes 2.4 0.8 - 5.3 10e9/L    Absolute Monocytes 1.6 (H) 0.0 - 1.3 10e9/L    Absolute Eosinophils 0.4 0.0 - 0.7 10e9/L    Absolute Basophils 0.2 0.0 - 0.2 10e9/L    Abs Immature Granulocytes 0.1 0 - 0.4 10e9/L    Absolute Nucleated RBC 0.5    Basic metabolic panel   Result Value Ref Range    Sodium 138 133 - 144 mmol/L    Potassium 3.2 (L) 3.4 - 5.3 mmol/L    Chloride 110 (H) 94 - 109 mmol/L    Carbon Dioxide 24 20 - 32 mmol/L    Anion Gap 5 3 - 14 mmol/L    Glucose 84 70 - 99 mg/dL    Urea Nitrogen 7 7 - 30 mg/dL    Creatinine 0.49 (L) 0.52 - 1.04 mg/dL    GFR Estimate >90 >60 mL/min/[1.73_m2]    GFR Estimate If Black >90 >60 mL/min/[1.73_m2]    Calcium 8.8 8.5 - 10.1 mg/dL   HCG qualitative pregnancy (blood)   Result Value Ref Range    HCG Qualitative Serum Negative NEG^Negative   Reticulocyte count   Result Value Ref Range    % Retic 20.1  (H) 0.5 - 2.0 %    Absolute Retic 419.0 (H) 25 - 95 10e9/L   Troponin I   Result Value Ref Range    Troponin I ES <0.015 0.000 - 0.045 ug/L   ABO/Rh type and screen   Result Value Ref Range    Units Ordered 1     ABO O     RH(D) Pos     Antibody Screen Neg     Test Valid Only At          [facility]    Specimen Expires 2021     Crossmatch Red Blood Cells    Blood component   Result Value Ref Range    Unit Number [id number]     Blood Component Type Red Blood Cells LeukoReduced (Part 2)     Division Number 00     Status of Unit Released to care unit 2021 1544     Blood Product Code [id number]     Unit Status ISS    Chest XR,  PA & LAT    Narrative    EXAM: XR CHEST 2 VW  2021 9:43 AM     HISTORY:  chest pain       COMPARISON:  Chest x-ray 2021    FINDINGS:   PA and lateral views of the chest. Left Port-A-Cath tip projects at  the cavoatrial junction. Trachea is midline. Cardiac and mediastinal  silhouette is within normal limits. No focal airspace opacity, pleural  effusion, or pneumothorax appreciated. Dextrocurvature of the  midthoracic spine. Visualized upper abdomen is unremarkable.      Impression    IMPRESSION:   Negative chest x-ray    I have personally reviewed the examination and initial interpretation  and I agree with the findings.    MARY JANE JUÁREZ MD   Echocardiogram Complete    Narrative    [id number]  [id number]  [id number]  [id number]^TRAM^SIMRAN^ALESSANDRA           [facility]  Echocardiography Laboratory  [address] [place] [id number]     Name: HIMANSHU AL  MRN: [id number]  : 2000  Study Date: 2021 09:54 AM  Age: 20 yrs  Gender: Female  Patient Location: [facility]  Reason For Study: Pulmonary Emboli  Ordering Physician: SIMRAN UGALDE  Performed By: Susi Ramos RDCS     BSA: 1.8 m2  Height: 64 in  Weight: 162 lb  HR: 90  BP: 126/72  mmHg  _____________________________________________________________________________  __        Procedure  Complete Portable Echo Adult. Echocardiogram with two-dimensional, color and  spectral Doppler performed.  _____________________________________________________________________________  __        Interpretation Summary  Left ventricular function, chamber size, wall motion, and wall thickness are  normal.The EF is 55-60%.  Right ventricular function, chamber size, wall motion, and thickness are  normal.  No significant valvular abnormalities were noted.     This study was compared with the study from 2/2/2021. No significant change is  noted, the RV function is normal with preserved geometry without signs of  pressure overload from the pulmonary embolism. Unable to estimate PA pressure  due to incomplete TR jet.  _____________________________________________________________________________  __        Left Ventricle  Left ventricular function, chamber size, wall motion, and wall thickness are  normal.The EF is 55-60%. Left ventricular diastolic function is normal. No  regional wall motion abnormalities are seen.     Right Ventricle  Right ventricular function, chamber size, wall motion, and thickness are  normal.     Atria  Both atria appear normal.     Mitral Valve  The mitral valve is normal.        Aortic Valve  Aortic valve is normal in structure and function. The aortic valve is  tricuspid.     Tricuspid Valve  The tricuspid valve is normal. Trace tricuspid insufficiency is present.  Pulmonary artery systolic pressure cannot be assessed.     Pulmonic Valve  The pulmonic valve is normal. Trace pulmonic insufficiency is present.     Vessels  The aorta root is normal. The thoracic aorta is normal. The pulmonary artery  cannot be assessed. The inferior vena cava was normal in size with preserved  respiratory variability.     Pericardium  No pericardial effusion is present.        Compared to Previous Study  This  study was compared with the study from 2021 . No significant change  is noted, the RV function is normal with preserved geometry without signs of  pressure overload from the pulmonary embolism. Unable to estimate PA pressure  due to incomplete TR jet.  _____________________________________________________________________________  __  MMode/2D Measurements & Calculations     IVSd: 0.97 cm  LVIDd: 5.3 cm  LVIDs: 3.8 cm  LVPWd: 1.0 cm  FS: 28.2 %  LV mass(C)d: 197.1 grams  LV mass(C)dI: 110.2 grams/m2  Ao root diam: 2.7 cm  asc Aorta Diam: 2.2 cm  LVOT diam: 2.0 cm  LVOT area: 3.1 cm2     EF(MOD-bp): 55.4 %  LA Volume (BP): 44.3 ml  LA Volume Index (BP): 24.7 ml/m2     RWT: 0.38        Doppler Measurements & Calculations  MV E max rigo: 128.0 cm/sec  MV A max rigo: 83.4 cm/sec  MV E/A: 1.5  MV dec slope: 783.0 cm/sec2  PA acc time: 0.14 sec  E/E' av.7  Lateral E/e': 6.6  Medial E/e': 8.8     _____________________________________________________________________________  __           Report approved by: MD Wilian Adler 2021 11:27 AM      Troponin I   Result Value Ref Range    Troponin I ES <0.015 0.000 - 0.045 ug/L   EKG 12 lead   Result Value Ref Range    Interpretation ECG Click View Image link to view waveform and result      Medications   heparin 100 UNIT/ML injection 5 mL (5 mLs Intracatheter Given 21 1832)   morphine (PF) injection 2 mg (2 mg Intravenous Given 21 1257)   lactated ringers infusion (0 mLs Intravenous Stopped 21 1600)   oxyCODONE (ROXICODONE) tablet 15 mg (15 mg Oral Given 21 181)       Assessments & Plan (with Medical Decision Making)   This is a 20-year-old female with history of sickle cell disease presenting with 3 days of pain crisis now associated with left-sided chest pain.  Arrives afebrile. Patient had slight tachycardia with heart 108; respiratory rate and O2 sat on room air normal.  Blood pressure 126/72.    IV access obtained via  implanted Port-A-Cath on the left.   Labs included CBC, basic metabolic panel, troponin and reticulocyte count.    X-ray chest and bedside echocardiogram performed.   Pain was managed following of her ED care plan with morphine 2 mg IV every 1 hour x3 doses in addition to IV fluid with lactated Ringer's.  Offered Benadryl and Zofran but patient declined needing these at this time, will reassess.    10:26 AM  Labs resulting. Notable for stable increased WBC of 13.5, hgb decreasing to 6.1, ptls 400.   Troponin negative.   HCG negative.   Metabolic panel with mild hypokalemia with K 3.2. Given oral potassium replacement.   , BP stable.  CXR images reviewed by me. No significant infiltrate, no pneumothorax or effusion. Radiology read not yet available.   Echo cardiogram done and pending read.     11:42 AM  Echo with normal LV and RV function. No signs of right heart strain from known PEs, suggests against new acute PE.   CXR without effusions or opacities.   Getting repeat troponin and EKG.   Has received 2 doses of morphine 2mg IV.     6:31 PM  Received 3rd dose of morphine and pain manageable.   Repeat EKG without ischemic changes.   Repeat troponin negative.   Pt is feeling symptomatic with her slowly decreasing hgb now at 6.1.   This was reviewed with Dr. Duncan from Hematology/Oncology who is in agreement with transfusion of 1 unit pRBCs today. Pt consented.   She anticipates pain being manageable after the care given today, so we will begin transitioning to oral pain management.   Signed out to Dr. Contreras at 6pm with pt nearly done with blood transfusion. Planning for discharge home with complete.       I have reviewed the nursing notes.    I have reviewed the findings, diagnosis, plan and need for follow up with the patient.    New Prescriptions    No medications on file       Final diagnoses:   Sickle cell pain crisis (H)   I, Gutierrez Gibson, am serving as a trained medical scribe to document services  personally performed by Cornelia Malloy MD, based on the provider's statements to me.     I, Cornelia Malloy MD, was physically present and have reviewed and verified the accuracy of this note documented by Gutierrez Gibson.      2/18/2021   [facility] EMERGENCY DEPARTMENT    MD YULI Mckinney, Cornelia Yadav MD  02/18/21 1836

## 2021-02-18 NOTE — TELEPHONE ENCOUNTER
RN triage   Call from pt -- call was disconnected   Attempted to call pt back --   Left message for pt to call triage back     JOE Story  Triage Nurse Advisor

## 2021-02-19 ENCOUNTER — PATIENT OUTREACH (OUTPATIENT)
Dept: CARE COORDINATION | Facility: CLINIC | Age: 21
End: 2021-02-19

## 2021-02-19 ENCOUNTER — INFUSION THERAPY VISIT (OUTPATIENT)
Dept: ONCOLOGY | Facility: CLINIC | Age: 21
End: 2021-02-19
Attending: PEDIATRICS
Payer: COMMERCIAL

## 2021-02-19 ENCOUNTER — TELEPHONE (OUTPATIENT)
Dept: ONCOLOGY | Facility: CLINIC | Age: 21
End: 2021-02-19

## 2021-02-19 VITALS
RESPIRATION RATE: 16 BRPM | WEIGHT: 163.9 LBS | BODY MASS INDEX: 28.13 KG/M2 | DIASTOLIC BLOOD PRESSURE: 83 MMHG | TEMPERATURE: 98.9 F | SYSTOLIC BLOOD PRESSURE: 130 MMHG | HEART RATE: 102 BPM | OXYGEN SATURATION: 97 %

## 2021-02-19 DIAGNOSIS — D57.1 HB-SS DISEASE WITHOUT CRISIS (H): ICD-10-CM

## 2021-02-19 DIAGNOSIS — D57.00 SICKLE CELL PAIN CRISIS (H): ICD-10-CM

## 2021-02-19 DIAGNOSIS — D57.00 SICKLE CELL PAIN CRISIS (H): Primary | ICD-10-CM

## 2021-02-19 LAB
ALBUMIN SERPL-MCNC: 3.3 G/DL (ref 3.4–5)
ALP SERPL-CCNC: 76 U/L (ref 40–150)
ALT SERPL W P-5'-P-CCNC: 53 U/L (ref 0–50)
ANION GAP SERPL CALCULATED.3IONS-SCNC: 7 MMOL/L (ref 3–14)
ANISOCYTOSIS BLD QL SMEAR: ABNORMAL
AST SERPL W P-5'-P-CCNC: 42 U/L (ref 0–45)
BASOPHILS # BLD AUTO: 0.2 10E9/L (ref 0–0.2)
BASOPHILS NFR BLD AUTO: 2 %
BILIRUB SERPL-MCNC: 1.9 MG/DL (ref 0.2–1.3)
BUN SERPL-MCNC: 6 MG/DL (ref 7–30)
CALCIUM SERPL-MCNC: 8.2 MG/DL (ref 8.5–10.1)
CHLORIDE SERPL-SCNC: 110 MMOL/L (ref 94–109)
CO2 SERPL-SCNC: 23 MMOL/L (ref 20–32)
CREAT SERPL-MCNC: 0.5 MG/DL (ref 0.52–1.04)
DIFFERENTIAL METHOD BLD: ABNORMAL
EOSINOPHIL # BLD AUTO: 0.5 10E9/L (ref 0–0.7)
EOSINOPHIL NFR BLD AUTO: 4 %
ERYTHROCYTE [DISTWIDTH] IN BLOOD BY AUTOMATED COUNT: 20.3 % (ref 10–15)
GFR SERPL CREATININE-BSD FRML MDRD: >90 ML/MIN/{1.73_M2}
GLUCOSE SERPL-MCNC: 92 MG/DL (ref 70–99)
HCT VFR BLD AUTO: 20.9 % (ref 35–47)
HGB BLD-MCNC: 7 G/DL (ref 11.7–15.7)
HOWELL-JOLLY BOD BLD QL SMEAR: PRESENT
LYMPHOCYTES # BLD AUTO: 1.9 10E9/L (ref 0.8–5.3)
LYMPHOCYTES NFR BLD AUTO: 17 %
MCH RBC QN AUTO: 28.5 PG (ref 26.5–33)
MCHC RBC AUTO-ENTMCNC: 33.5 G/DL (ref 31.5–36.5)
MCV RBC AUTO: 85 FL (ref 78–100)
MONOCYTES # BLD AUTO: 0.5 10E9/L (ref 0–1.3)
MONOCYTES NFR BLD AUTO: 4 %
NEUTROPHILS # BLD AUTO: 8.3 10E9/L (ref 1.6–8.3)
NEUTROPHILS NFR BLD AUTO: 73 %
NRBC # BLD AUTO: 1.6 10*3/UL
NRBC BLD AUTO-RTO: 14 /100
PLATELET # BLD AUTO: 413 10E9/L (ref 150–450)
PLATELET # BLD EST: ABNORMAL 10*3/UL
POIKILOCYTOSIS BLD QL SMEAR: ABNORMAL
POLYCHROMASIA BLD QL SMEAR: ABNORMAL
POTASSIUM SERPL-SCNC: 3.4 MMOL/L (ref 3.4–5.3)
PROT SERPL-MCNC: 7.8 G/DL (ref 6.8–8.8)
RBC # BLD AUTO: 2.46 10E12/L (ref 3.8–5.2)
RETICS # AUTO: 380 10E9/L (ref 25–95)
RETICS/RBC NFR AUTO: 15.6 % (ref 0.5–2)
SICKLE CELLS BLD QL SMEAR: ABNORMAL
SODIUM SERPL-SCNC: 140 MMOL/L (ref 133–144)
TARGETS BLD QL SMEAR: ABNORMAL
WBC # BLD AUTO: 11.4 10E9/L (ref 4–11)

## 2021-02-19 PROCEDURE — 96361 HYDRATE IV INFUSION ADD-ON: CPT

## 2021-02-19 PROCEDURE — 85045 AUTOMATED RETICULOCYTE COUNT: CPT | Performed by: PHYSICIAN ASSISTANT

## 2021-02-19 PROCEDURE — 80053 COMPREHEN METABOLIC PANEL: CPT | Performed by: PHYSICIAN ASSISTANT

## 2021-02-19 PROCEDURE — 96376 TX/PRO/DX INJ SAME DRUG ADON: CPT

## 2021-02-19 PROCEDURE — 258N000003 HC RX IP 258 OP 636: Performed by: PHYSICIAN ASSISTANT

## 2021-02-19 PROCEDURE — 96374 THER/PROPH/DIAG INJ IV PUSH: CPT

## 2021-02-19 PROCEDURE — 85025 COMPLETE CBC W/AUTO DIFF WBC: CPT | Performed by: PHYSICIAN ASSISTANT

## 2021-02-19 PROCEDURE — 250N000011 HC RX IP 250 OP 636: Performed by: PEDIATRICS

## 2021-02-19 PROCEDURE — 250N000011 HC RX IP 250 OP 636: Performed by: PHYSICIAN ASSISTANT

## 2021-02-19 PROCEDURE — 250N000011 HC RX IP 250 OP 636: Performed by: INTERNAL MEDICINE

## 2021-02-19 PROCEDURE — 250N000013 HC RX MED GY IP 250 OP 250 PS 637: Performed by: PHYSICIAN ASSISTANT

## 2021-02-19 RX ORDER — ONDANSETRON 8 MG/1
8 TABLET, FILM COATED ORAL
Status: CANCELLED
Start: 2021-02-19

## 2021-02-19 RX ORDER — HEPARIN SODIUM,PORCINE 10 UNIT/ML
5 VIAL (ML) INTRAVENOUS
Status: CANCELLED | OUTPATIENT
Start: 2021-02-19

## 2021-02-19 RX ORDER — HEPARIN SODIUM (PORCINE) LOCK FLUSH IV SOLN 100 UNIT/ML 100 UNIT/ML
5 SOLUTION INTRAVENOUS
Status: DISCONTINUED | OUTPATIENT
Start: 2021-02-19 | End: 2021-02-19 | Stop reason: HOSPADM

## 2021-02-19 RX ORDER — ONDANSETRON 8 MG/1
8 TABLET, FILM COATED ORAL
Status: DISCONTINUED | OUTPATIENT
Start: 2021-02-19 | End: 2021-02-19

## 2021-02-19 RX ORDER — ONDANSETRON 8 MG/1
8 TABLET, ORALLY DISINTEGRATING ORAL
Status: DISCONTINUED | OUTPATIENT
Start: 2021-02-19 | End: 2021-02-19 | Stop reason: HOSPADM

## 2021-02-19 RX ORDER — MORPHINE SULFATE 2 MG/ML
2 INJECTION, SOLUTION INTRAMUSCULAR; INTRAVENOUS
Status: COMPLETED | OUTPATIENT
Start: 2021-02-19 | End: 2021-02-19

## 2021-02-19 RX ORDER — HEPARIN SODIUM (PORCINE) LOCK FLUSH IV SOLN 100 UNIT/ML 100 UNIT/ML
5 SOLUTION INTRAVENOUS
Status: COMPLETED | OUTPATIENT
Start: 2021-02-19 | End: 2021-02-19

## 2021-02-19 RX ORDER — DIPHENHYDRAMINE HCL 25 MG
25 CAPSULE ORAL
Status: CANCELLED
Start: 2021-02-19

## 2021-02-19 RX ORDER — HEPARIN SODIUM (PORCINE) LOCK FLUSH IV SOLN 100 UNIT/ML 100 UNIT/ML
5 SOLUTION INTRAVENOUS
Status: CANCELLED | OUTPATIENT
Start: 2021-02-19

## 2021-02-19 RX ORDER — DIPHENHYDRAMINE HCL 25 MG
25 CAPSULE ORAL
Status: COMPLETED | OUTPATIENT
Start: 2021-02-19 | End: 2021-02-19

## 2021-02-19 RX ORDER — MORPHINE SULFATE 2 MG/ML
2 INJECTION, SOLUTION INTRAMUSCULAR; INTRAVENOUS
Status: CANCELLED
Start: 2021-02-19

## 2021-02-19 RX ADMIN — MORPHINE SULFATE 2 MG: 2 INJECTION, SOLUTION INTRAMUSCULAR; INTRAVENOUS at 12:10

## 2021-02-19 RX ADMIN — DIPHENHYDRAMINE HYDROCHLORIDE 25 MG: 25 CAPSULE ORAL at 11:52

## 2021-02-19 RX ADMIN — DEXTROSE AND SODIUM CHLORIDE 500 ML: 5; 450 INJECTION, SOLUTION INTRAVENOUS at 11:53

## 2021-02-19 RX ADMIN — MORPHINE SULFATE 2 MG: 2 INJECTION, SOLUTION INTRAMUSCULAR; INTRAVENOUS at 14:08

## 2021-02-19 RX ADMIN — Medication 5 ML: at 11:30

## 2021-02-19 RX ADMIN — MORPHINE SULFATE 2 MG: 2 INJECTION, SOLUTION INTRAMUSCULAR; INTRAVENOUS at 13:13

## 2021-02-19 RX ADMIN — Medication 5 ML: at 14:59

## 2021-02-19 ASSESSMENT — PAIN SCALES - GENERAL: PAINLEVEL: EXTREME PAIN (9)

## 2021-02-19 NOTE — PROGRESS NOTES
Social Work Follow-Up  [facility]    Data/Intervention:  Patient Name:  Jennifer Cervantes  /Age:  2000 (20 year old)    Reason for Follow-Up:  Transportation    Collaborated With:    -JOE Patel Triage  -pt   -transportation plus    Intervention/Education/Resources Provided:  SW received in-basket message that pt is needing transportation set up for appointment at 11am. SW used taxi voucher as it was too close to appointment time to call insurance. Transportation plus to take pt to and from appointment with will call return ride. SW relayed this information to pt and pt verbalized that they have number to call for return ride.     Assessment/Plan:  SW to remain available as needed.  Previously provided patient/family with writer's contact information and availability.       CARLOS Chavez,LGSW  Hematology/Oncology Social Worker  Phone:[phone] Pager: [phone]

## 2021-02-19 NOTE — PROGRESS NOTES
[address] PRIMARY CARE  [address] [place] [address] Dr [address]af[address] [facility] [address] [facility] [address]  Dept: [phone]  Dept Fax: [phone]    Ryann Mora is a 45 y.o. female who presentstoday for her medical conditions/complaints as noted below.   Ryann Mora is c/o of  Chief Complaint   Patient presents with    Diabetes     3 month recheck     GI Problem     Lump in stomach x 2 months            HPI:     Presents today for 3 month recheck on DM  Hga1c from 8 to 8.2    Had surgery with Dr. Shawn Rodriges in July   Has follow up with repeat CT coming up  Last February noted bulging from incision site    Has follow up with GI today, Dr. Shlomo Suarez to have abdominal pain, RLQ  3 episodes noted since last visit  Continues to track food  Triggers noted: chick emma a, chili, pizza        Hemoglobin A1C (%)   Date Value   06/03/2019 8.2   02/21/2019 8.0   11/12/2018 7.9             ( goal A1C is < 7)   No results found for: LABMICR  LDL Calculated (mg/dL)   Date Value   07/25/2018 149 (H)       (goal LDL is <100)   AST (U/L)   Date Value   07/25/2018 22     ALT (U/L)   Date Value   07/25/2018 35     BUN (mg/dL)   Date Value   07/25/2018 13     BP Readings from Last 3 Encounters:   06/03/19 122/78   02/21/19 120/82   11/12/18 118/82          ([id number])    Past Medical History:   Diagnosis Date    Diabetes mellitus ([address]     Hypothyroidism       Past Surgical History:   Procedure Laterality Date    KNEE SURGERY Right        Family History   Problem Relation Age of Onset    Cancer Father     Cancer Sister           Social History     Tobacco Use    Smoking status: Never Smoker    Smokeless tobacco: Never Used   Substance Use Topics    Alcohol use: Yes     Comment: social       Current Outpatient Medications   Medication Sig Dispense Refill    furosemide (LASIX) 40 MG tablet Take 1 tablet by mouth daily 90 tablet 1    metFORMIN (GLUCOPHAGE) 1000 MG tablet Take 1 tablet by mouth 2 Infusion Nursing Note:  Jennifer Cervantes presents today for IVF and pain medications.    Patient seen by provider today: No    Note: Pt presented to clinic with c/o pain, achy and sharp in back rated 9/10.  Pt's pain goal is 7/10 today.  Pt reports CP, lightheadedness and fatigue improved since yesterday's RBC infusion.  Per RONALDO Allan, no additional RBCs needed for Hgb=7.0 today.  Pt's pain steadily improved throughout infusion process and goal of 7/10 was reached at time of discharge.  Pt encouraged to call triage or present to ED as needed if pain returns/is uncontrolled at home.  Pt denied any changes to allergies or medications overnight.  Pt declined to collect COVID test today.    Intravenous Access:  Implanted Port.    Treatment Conditions:  Lab Results   Component Value Date    HGB 7.0 02/19/2021     Lab Results   Component Value Date    WBC 11.4 02/19/2021      Lab Results   Component Value Date    ANEU 9.0 02/18/2021     Lab Results   Component Value Date     02/19/2021      Lab Results   Component Value Date     02/19/2021                   Lab Results   Component Value Date    POTASSIUM 3.4 02/19/2021           Lab Results   Component Value Date    MAG 2.1 02/02/2021            Lab Results   Component Value Date    CR 0.50 02/19/2021                   Lab Results   Component Value Date    MICAH 8.2 02/19/2021                Lab Results   Component Value Date    BILITOTAL 1.9 02/19/2021           Lab Results   Component Value Date    ALBUMIN 3.3 02/19/2021                    Lab Results   Component Value Date    ALT 53 02/19/2021           Lab Results   Component Value Date    AST 42 02/19/2021     Results reviewed, labs MET treatment parameters, ok to proceed with treatment.    Post Infusion Assessment:  Patient tolerated infusion without incident.  Blood return noted pre and post infusion.  Site patent and intact, free from redness, edema or discomfort.  No evidence of  extravasations.  Access discontinued per protocol.    Discharge Plan:   Patient declined prescription refills.  Discharge instructions reviewed with: Patient.  Patient and/or family verbalized understanding of discharge instructions and all questions answered.  AVS to patient via Votizen[employer].  Patient will return 2/24/2021 for next appointment with ANDREAS.   Patient discharged in stable condition accompanied by: self.  Departure Mode: Ambulatory.    Lupe Bolton RN                     times daily Indications: Take 2 tablets 2 times daily 180 tablet 1    SITagliptin (JANUVIA) 100 MG tablet Take 1 tablet by mouth daily 30 tablet 0    loratadine (CLARITIN) 10 MG tablet TAKE 1 TABLET BY MOUTH EVERY DAY 30 tablet 5    KLOR-CON M20 20 MEQ extended release tablet TAKE 1 TABLET BY MOUTH EVERY DAY 90 tablet 3    SUMAtriptan (IMITREX) 100 MG tablet TAKE 1 TABLET BY MOUTH FOR MIGRAINE AS NEEDED 6 tablet 3    ammonium lactate (LAC-HYDRIN) 12 % lotion Apply topically BID, rub in thoroughly. 400 g 11    levothyroxine (SYNTHROID) 137 MCG tablet TAKE 1 TABLET BY MOUTH EVERY DAY 90 tablet 1    Blood Glucose Monitoring Suppl (ONETOUCH VERIO) w/Device KIT USE AS DIRECTED DAILY BY YOUR PHYSICIAN 1 kit 0    fluticasone (FLONASE) 50 MCG/ACT nasal spray 1 spray by Nasal route daily      ammonium lactate (LAC-HYDRIN) 12 % lotion Apply topically as needed for Dry Skin Apply topically as needed.  glucose blood VI test strips (ASCENSIA AUTODISC VI;ONE TOUCH ULTRA TEST VI) strip Use with associated glucose meter. 100 strip 11     No current facility-administered medications for this visit.       Allergies   Allergen Reactions    Ciprofloxacin        Health Maintenance   Topic Date Due    Pneumococcal 0-64 years Vaccine (1 of 1 - PPSV23) 12/28/1972    Diabetic foot exam  12/28/1976    Diabetic retinal exam  12/28/1976    HIV screen  12/28/1981    Diabetic microalbuminuria test  12/28/1984    Hepatitis B Vaccine (1 of 3 - Risk 3-dose series) 12/28/1985    DTaP/Tdap/Td vaccine (1 - Tdap) 12/28/1985    Cervical cancer screen  12/28/1987    Shingles Vaccine (1 of 2) 08/05/2019 (Originally 12/28/2016)    Flu vaccine (Season Ended) 11/08/2019 (Originally 9/1/2019)    Lipid screen  07/25/2019    TSH testing  07/25/2019    Potassium monitoring  07/25/2019    Creatinine monitoring  07/25/2019    A1C test (Diabetic or Prediabetic)  02/21/2020    Breast cancer screen  05/24/2020    Colon cancer screen colonoscopy  01/15/2028       Subjective:      Review of Systems   Constitutional: Negative for chills, fatigue and fever. HENT: Negative for congestion. Eyes: Negative for visual disturbance. Respiratory: Negative for cough and shortness of breath. Cardiovascular: Negative for chest pain and palpitations. Gastrointestinal: Positive for abdominal pain (RLQ pain). Genitourinary: Negative for dysuria. Musculoskeletal: Negative for back pain. Neurological: Positive for headaches. Negative for dizziness and numbness. Psychiatric/Behavioral: Negative for self-injury, sleep disturbance and suicidal ideas. The patient is not nervous/anxious. Objective:     Physical Exam   Constitutional: She is oriented to person, place, and time. She appears well-developed and well-nourished. HENT:   Head: Normocephalic and atraumatic. Eyes: Pupils are equal, round, and reactive to light. Neck: Normal range of motion. Neck supple. Cardiovascular: Normal rate, regular rhythm and normal heart sounds. Pulmonary/Chest: Effort normal and breath sounds normal.   Abdominal: Soft. Bowel sounds are normal. There is tenderness in the right lower quadrant. A hernia is present. Musculoskeletal: Normal range of motion. Neurological: She is alert and oriented to person, place, and time. Skin: Skin is warm and dry. Psychiatric: She has a normal mood and affect. Her behavior is normal. Judgment and thought content normal.   Nursing note and vitals reviewed. /78   Pulse 91   Resp 17   Ht 5' 7\" (1.702 m)   Wt 256 lb 12.8 oz (116.5 kg)   SpO2 98%   Breastfeeding? No   BMI 40.22 kg/m²     Assessment:       Diagnosis Orders   1. Type 2 diabetes mellitus without complication, with long-term current use of insulin ([facility])  POCT glycosylated hemoglobin (Hb A1C)   2. Screening for breast cancer  FERNANDEZ DIGITAL SCREEN W CAD BILATERAL   3. Screening for thyroid disorder  TSH with Reflex   4.  Screening for deficiency anemia  CBC   5. Screening for diabetes mellitus  Comprehensive Metabolic Panel   6. Screening for lipid disorders  Lipid Panel             Plan:      Return in about 2 months (around 8/3/2019) for annual exam.    1. DM- Hga1c 8.2. Increase januvia to 100mg. Encouraged diet/exercise. Rx given for annual labs/mammogram. Follow up in 2 months for recheck/repeat Hga1c. Of the 25 minute duration appointment visit, Rex Rendon [facility] spent at least 50% of the face-to-face time in counseling, explanation of diagnosis, planning of further management, and answering all questions. Orders Placed This Encounter   Procedures    FERNANDEZ DIGITAL SCREEN W CAD BILATERAL     Standing Status:   Future     Standing Expiration Date:   6/2/2020     Order Specific Question:   Reason for exam:     Answer:   screening    TSH with Reflex     Standing Status:   Future     Standing Expiration Date:   6/2/2020    CBC     Standing Status:   Future     Standing Expiration Date:   6/2/2020    Comprehensive Metabolic Panel     Standing Status:   Future     Standing Expiration Date:   6/2/2020    Lipid Panel     Standing Status:   Future     Standing Expiration Date:   6/2/2020     Order Specific Question:   Is Patient Fasting?/# of Hours     Answer:   12    POCT glycosylated hemoglobin (Hb A1C)        Orders Placed This Encounter   Medications    furosemide (LASIX) 40 MG tablet     Sig: Take 1 tablet by mouth daily     Dispense:  90 tablet     Refill:  1    metFORMIN (GLUCOPHAGE) 1000 MG tablet     Sig: Take 1 tablet by mouth 2 times daily Indications: Take 2 tablets 2 times daily     Dispense:  180 tablet     Refill:  1    SITagliptin (JANUVIA) 100 MG tablet     Sig: Take 1 tablet by mouth daily     Dispense:  30 tablet     Refill:  0       Patient given educational materials - see patient instructions. Discussed use, benefit, and side effects of prescribed medications. All patientquestions answered.  Pt voiced understanding. Reviewed health maintenance. Instructedto continue current medications, diet and exercise. Patient agreed with treatmentplan. Follow up as directed.      Electronicallysigned by EDWAR Montes CNP on 6/3/2019 at 9:09 AM

## 2021-02-19 NOTE — TELEPHONE ENCOUNTER
Pt called in to triage requesting IVF pain meds for back pain rated 8/10 x1 days. Stated last took prn Oxy 15 mg at 9 am this morning without relief, previous dose was 10 pm.     She was advised to go to the ED yesterday because she reported chest pain, she denies any chest pain today. Stated she felt better after IVF pain meds there and 2-3 hours later back pain returned. Feels if she is unable to get into infusion she will monitor through the weekend ok.    Denied any fevers, chills, cough, sob, chest pain or other symptoms. Pt's last infusion was 2/17, last clinic visit 2/5 with follow-up on 2/24 with Andrei HIGGINS. Pt denied being out of home medications and needing any refills today. Paged Andrei HIGGINS.

## 2021-02-19 NOTE — PATIENT INSTRUCTIONS
Contact Numbers    [facility]: [phone]    Call with chills and/or temperature greater than or equal to 100.5, uncontrolled nausea/vomiting, diarrhea, constipation, dizziness, shortness of breath, chest pain, bleeding, unexplained bruising, or any new/concerning symptoms, questions/concerns.     If you are having any concerning symptoms or wish to speak to a provider before your next infusion visit, please call your care coordinator or triage to notify them so we can adequately serve you.       After Hours: [phone]    If after hours, weekends, or holidays, call main hospital  and ask for Oncology doctor on call.       February 2021 Sunday Monday Tuesday Wednesday Thursday Friday Saturday        1    NM LUNG SCAN VENT/PERF   1:00 AM   (90 min.)   [email or web address]   [facility]    Admission   9:36 PM   Pedro Tinajero DO   [facility] [address] [place]   (Discharge: 2/3/2021) 2    XR CHEST PORT 1 VIEW   8:10 AM   (20 min.)   [id number]   [facility]    ECHO COMPLETE   8:20 AM   (60 min.)   [email or web address]   [facility] 3     4     5    VIDEO VISIT RETURN   2:15 PM   (30 min.)   Eric Duncan MD   [facility] 6       7     8     9    [facility] ONC INFUSION 120  12:30 PM   (120 min.)   UC ONCOLOGY INFUSION   [facility] 10    LAB CENTRAL  10:45 AM   (15 min.)   [facility]   [facility] RETURN  10:55 AM   (50 min.)   Andrei Machado PA   [facility] BMT INFUSION 120   1:30 PM   (120 min.)   UC BMT INFUSION   [facility] [place] 11    MR ABDOMEN WO   7:30 AM   (60 min.)   [email or web address]   [facility]    MR MYOCARDIUM WO   9:00 AM   (105 min.)   [id number]   [facility]    VIDEO VISIT NEW   2:45 PM    (30 min.)   Suraj Case MD   [facility] [place]    Admission   8:29 PM   Artie Taylor MD   [facility] Emergency Department   (Discharge: 2/12/2021) 12     13       14     15     16    [facility] ONC INFUSION 120  11:00 AM   (120 min.)   UC ONCOLOGY INFUSION   [facility] 17    LAB CENTRAL  11:45 AM   (15 min.)   [facility] DRAW   [facility] ONC INFUSION 120  12:30 PM   (120 min.)   UC ONCOLOGY INFUSION   [facility]    VIDEO VISIT NEW   3:20 PM   (80 min.)   Rosalva Gasca MD   [facility] 18    Admission   8:54 AM   Cornelia Malloy MD   [facility] Emergency Department   (Discharge: 2/18/2021)    ECHO COMPLETE   9:20 AM   (60 min.)   [id number]   [facility]    XR CHEST 2 VIEWS   9:25 AM   (15 min.)   [email or web address]   [facility] Imaging 19    LAB PERIPHERAL  11:00 AM   (15 min.)   [facility]   [facility] ONC INFUSION 120  11:30 AM   (120 min.)   UC ONCOLOGY INFUSION   [facility] 20       21     22     23     24    LAB CENTRAL   9:45 AM   (15 min.)   [facility]   [facility] RETURN  10:05 AM   (50 min.)   Andrei Machado PA   [facility] 25     26     27       28 March 2021 Sunday Monday Tuesday Wednesday Thursday Friday Saturday        1     2     3     4  Happy Birthday!     5     6       7     8     9     10     11     12     13       14     15     16     17     18     19     20       21     22     23     24     25     26    LAB CENTRAL  12:30 PM   (15 min.)   [facility] DRAW   [facility]  Clinic    [facility] RETURN  12:45 PM   (30 min.)   Eric Duncan MD   [facility] 27       28     29     30     31                                    Lab Results:  Recent Results (from the past 12 hour(s))   Reticulocyte count    Collection Time: 02/19/21 11:35 AM   Result Value Ref Range    % Retic 15.6 (H) 0.5 - 2.0 %    Absolute Retic 380.0 (H) 25 - 95 10e9/L   Comprehensive metabolic panel    Collection Time: 02/19/21 11:35 AM   Result Value Ref Range    Sodium 140 133 - 144 mmol/L    Potassium 3.4 3.4 - 5.3 mmol/L    Chloride 110 (H) 94 - 109 mmol/L    Carbon Dioxide 23 20 - 32 mmol/L    Anion Gap 7 3 - 14 mmol/L    Glucose 92 70 - 99 mg/dL    Urea Nitrogen 6 (L) 7 - 30 mg/dL    Creatinine 0.50 (L) 0.52 - 1.04 mg/dL    GFR Estimate >90 >60 mL/min/[1.73_m2]    GFR Estimate If Black >90 >60 mL/min/[1.73_m2]    Calcium 8.2 (L) 8.5 - 10.1 mg/dL    Bilirubin Total 1.9 (H) 0.2 - 1.3 mg/dL    Albumin 3.3 (L) 3.4 - 5.0 g/dL    Protein Total 7.8 6.8 - 8.8 g/dL    Alkaline Phosphatase 76 40 - 150 U/L    ALT 53 (H) 0 - 50 U/L    AST 42 0 - 45 U/L   *CBC with platelets differential    Collection Time: 02/19/21 11:35 AM   Result Value Ref Range    WBC 11.4 (H) 4.0 - 11.0 10e9/L    RBC Count 2.46 (L) 3.8 - 5.2 10e12/L    Hemoglobin 7.0 (L) 11.7 - 15.7 g/dL    Hematocrit 20.9 (L) 35.0 - 47.0 %    MCV 85 78 - 100 fl    MCH 28.5 26.5 - 33.0 pg    MCHC 33.5 31.5 - 36.5 g/dL    RDW 20.3 (H) 10.0 - 15.0 %    Platelet Count 413 150 - 450 10e9/L    Diff Method PENDING

## 2021-02-19 NOTE — TELEPHONE ENCOUNTER
Pt scheduled for labs 11 am, [facility] infusion 1130 am, pt accepted, SW forwarded message to assist with rides.

## 2021-02-19 NOTE — NURSING NOTE
"Chief Complaint   Patient presents with     Port Draw     labs drawn from port by rn.  vs taken     Port accessed with 20 gauge 3/4\" gripper needle and labs drawn by rn.  Port flushed with NS and heparin.  Pt tolerated well.  VS taken.  Pt checked in for next appt.    Gay Rice RN      "

## 2021-02-21 ENCOUNTER — APPOINTMENT (OUTPATIENT)
Dept: GENERAL RADIOLOGY | Facility: CLINIC | Age: 21
End: 2021-02-21
Attending: EMERGENCY MEDICINE
Payer: COMMERCIAL

## 2021-02-21 ENCOUNTER — HOSPITAL ENCOUNTER (EMERGENCY)
Facility: CLINIC | Age: 21
Discharge: HOME | End: 2021-02-22
Attending: EMERGENCY MEDICINE | Admitting: EMERGENCY MEDICINE
Payer: COMMERCIAL

## 2021-02-21 DIAGNOSIS — D57.00 SICKLE CELL PAIN CRISIS (H): ICD-10-CM

## 2021-02-21 LAB
ALBUMIN SERPL-MCNC: 3 G/DL (ref 3.4–5)
ALP SERPL-CCNC: 63 U/L (ref 40–150)
ALT SERPL W P-5'-P-CCNC: 43 U/L (ref 0–50)
ANION GAP SERPL CALCULATED.3IONS-SCNC: 5 MMOL/L (ref 3–14)
AST SERPL W P-5'-P-CCNC: 35 U/L (ref 0–45)
BILIRUB SERPL-MCNC: 1.2 MG/DL (ref 0.2–1.3)
BUN SERPL-MCNC: 9 MG/DL (ref 7–30)
CALCIUM SERPL-MCNC: 7.6 MG/DL (ref 8.5–10.1)
CHLORIDE SERPL-SCNC: 115 MMOL/L (ref 94–109)
CO2 SERPL-SCNC: 21 MMOL/L (ref 20–32)
CREAT SERPL-MCNC: 0.46 MG/DL (ref 0.52–1.04)
DIFFERENTIAL METHOD BLD: ABNORMAL
ERYTHROCYTE [DISTWIDTH] IN BLOOD BY AUTOMATED COUNT: 20.9 % (ref 10–15)
GFR SERPL CREATININE-BSD FRML MDRD: >90 ML/MIN/{1.73_M2}
GLUCOSE SERPL-MCNC: 83 MG/DL (ref 70–99)
HCG SERPL QL: NEGATIVE
HCT VFR BLD AUTO: 19.4 % (ref 35–47)
HGB BLD-MCNC: 6.1 G/DL (ref 11.7–15.7)
INR PPP: 1.43 (ref 0.86–1.14)
LACTATE BLD-SCNC: 0.7 MMOL/L (ref 0.7–2)
MCH RBC QN AUTO: 26.8 PG (ref 26.5–33)
MCHC RBC AUTO-ENTMCNC: 31.4 G/DL (ref 31.5–36.5)
MCV RBC AUTO: 85 FL (ref 78–100)
PLATELET # BLD AUTO: 424 10E9/L (ref 150–450)
POTASSIUM SERPL-SCNC: 3 MMOL/L (ref 3.4–5.3)
PROT SERPL-MCNC: 7.5 G/DL (ref 6.8–8.8)
RBC # BLD AUTO: 2.28 10E12/L (ref 3.8–5.2)
RETICS # AUTO: 301 10E9/L (ref 25–95)
RETICS/RBC NFR AUTO: 13.2 % (ref 0.5–2)
SODIUM SERPL-SCNC: 142 MMOL/L (ref 133–144)
WBC # BLD AUTO: 11.1 10E9/L (ref 4–11)

## 2021-02-21 PROCEDURE — 85045 AUTOMATED RETICULOCYTE COUNT: CPT | Performed by: EMERGENCY MEDICINE

## 2021-02-21 PROCEDURE — 85610 PROTHROMBIN TIME: CPT | Performed by: EMERGENCY MEDICINE

## 2021-02-21 PROCEDURE — 83605 ASSAY OF LACTIC ACID: CPT | Performed by: EMERGENCY MEDICINE

## 2021-02-21 PROCEDURE — 84703 CHORIONIC GONADOTROPIN ASSAY: CPT | Performed by: EMERGENCY MEDICINE

## 2021-02-21 PROCEDURE — 250N000011 HC RX IP 250 OP 636: Performed by: EMERGENCY MEDICINE

## 2021-02-21 PROCEDURE — 99285 EMERGENCY DEPT VISIT HI MDM: CPT | Performed by: EMERGENCY MEDICINE

## 2021-02-21 PROCEDURE — 71046 X-RAY EXAM CHEST 2 VIEWS: CPT

## 2021-02-21 PROCEDURE — 85025 COMPLETE CBC W/AUTO DIFF WBC: CPT | Performed by: EMERGENCY MEDICINE

## 2021-02-21 PROCEDURE — 82550 ASSAY OF CK (CPK): CPT | Performed by: EMERGENCY MEDICINE

## 2021-02-21 PROCEDURE — 96361 HYDRATE IV INFUSION ADD-ON: CPT

## 2021-02-21 PROCEDURE — 96376 TX/PRO/DX INJ SAME DRUG ADON: CPT

## 2021-02-21 PROCEDURE — 99285 EMERGENCY DEPT VISIT HI MDM: CPT | Mod: 25

## 2021-02-21 PROCEDURE — 71046 X-RAY EXAM CHEST 2 VIEWS: CPT | Mod: 26 | Performed by: RADIOLOGY

## 2021-02-21 PROCEDURE — 83735 ASSAY OF MAGNESIUM: CPT | Performed by: EMERGENCY MEDICINE

## 2021-02-21 PROCEDURE — 96374 THER/PROPH/DIAG INJ IV PUSH: CPT

## 2021-02-21 PROCEDURE — 80053 COMPREHEN METABOLIC PANEL: CPT | Performed by: EMERGENCY MEDICINE

## 2021-02-21 PROCEDURE — 84100 ASSAY OF PHOSPHORUS: CPT | Performed by: EMERGENCY MEDICINE

## 2021-02-21 PROCEDURE — 258N000003 HC RX IP 258 OP 636: Performed by: EMERGENCY MEDICINE

## 2021-02-21 RX ORDER — MORPHINE SULFATE 2 MG/ML
2 INJECTION, SOLUTION INTRAMUSCULAR; INTRAVENOUS
Status: COMPLETED | OUTPATIENT
Start: 2021-02-21 | End: 2021-02-22

## 2021-02-21 RX ORDER — POTASSIUM CHLORIDE 20MEQ/15ML
40 LIQUID (ML) ORAL ONCE
Status: COMPLETED | OUTPATIENT
Start: 2021-02-21 | End: 2021-02-22

## 2021-02-21 RX ORDER — SODIUM CHLORIDE, SODIUM LACTATE, POTASSIUM CHLORIDE, CALCIUM CHLORIDE 600; 310; 30; 20 MG/100ML; MG/100ML; MG/100ML; MG/100ML
1000 INJECTION, SOLUTION INTRAVENOUS CONTINUOUS
Status: DISCONTINUED | OUTPATIENT
Start: 2021-02-21 | End: 2021-02-22 | Stop reason: HOSPADM

## 2021-02-21 RX ADMIN — SODIUM CHLORIDE, POTASSIUM CHLORIDE, SODIUM LACTATE AND CALCIUM CHLORIDE 1000 ML: 600; 310; 30; 20 INJECTION, SOLUTION INTRAVENOUS at 23:06

## 2021-02-21 RX ADMIN — MORPHINE SULFATE 2 MG: 2 INJECTION, SOLUTION INTRAMUSCULAR; INTRAVENOUS at 23:05

## 2021-02-21 RX ADMIN — SODIUM CHLORIDE, POTASSIUM CHLORIDE, SODIUM LACTATE AND CALCIUM CHLORIDE 500 ML: 600; 310; 30; 20 INJECTION, SOLUTION INTRAVENOUS at 21:30

## 2021-02-21 RX ADMIN — MORPHINE SULFATE 2 MG: 2 INJECTION, SOLUTION INTRAMUSCULAR; INTRAVENOUS at 21:30

## 2021-02-22 VITALS
SYSTOLIC BLOOD PRESSURE: 142 MMHG | RESPIRATION RATE: 18 BRPM | HEART RATE: 113 BPM | DIASTOLIC BLOOD PRESSURE: 90 MMHG | TEMPERATURE: 99.7 F | OXYGEN SATURATION: 99 %

## 2021-02-22 LAB
CK SERPL-CCNC: 50 U/L (ref 30–225)
MAGNESIUM SERPL-MCNC: 1.7 MG/DL (ref 1.6–2.3)
PHOSPHATE SERPL-MCNC: 3.6 MG/DL (ref 2.5–4.5)

## 2021-02-22 PROCEDURE — 250N000011 HC RX IP 250 OP 636: Performed by: EMERGENCY MEDICINE

## 2021-02-22 PROCEDURE — 96376 TX/PRO/DX INJ SAME DRUG ADON: CPT

## 2021-02-22 PROCEDURE — 250N000013 HC RX MED GY IP 250 OP 250 PS 637: Performed by: EMERGENCY MEDICINE

## 2021-02-22 RX ORDER — HEPARIN SODIUM (PORCINE) LOCK FLUSH IV SOLN 100 UNIT/ML 100 UNIT/ML
5 SOLUTION INTRAVENOUS ONCE
Status: COMPLETED | OUTPATIENT
Start: 2021-02-22 | End: 2021-02-22

## 2021-02-22 RX ORDER — HEPARIN SODIUM (PORCINE) LOCK FLUSH IV SOLN 100 UNIT/ML 100 UNIT/ML
5 SOLUTION INTRAVENOUS
Status: DISCONTINUED | OUTPATIENT
Start: 2021-02-22 | End: 2021-02-22 | Stop reason: HOSPADM

## 2021-02-22 RX ADMIN — Medication 5 ML: at 03:07

## 2021-02-22 RX ADMIN — POTASSIUM CHLORIDE 40 MEQ: 20 SOLUTION ORAL at 00:08

## 2021-02-22 RX ADMIN — MORPHINE SULFATE 2 MG: 2 INJECTION, SOLUTION INTRAMUSCULAR; INTRAVENOUS at 02:22

## 2021-02-22 ASSESSMENT — ENCOUNTER SYMPTOMS
NUMBNESS: 0
NECK PAIN: 0
WHEEZING: 0
COLOR CHANGE: 0
SHORTNESS OF BREATH: 0
JOINT SWELLING: 0
CHILLS: 0
HEADACHES: 0
PSYCHIATRIC NEGATIVE: 1
WEAKNESS: 0
COUGH: 0
DIZZINESS: 0
NECK STIFFNESS: 0
GASTROINTESTINAL NEGATIVE: 1
SEIZURES: 0
LIGHT-HEADEDNESS: 0
FEVER: 0

## 2021-02-22 NOTE — DISCHARGE INSTRUCTIONS
TODAY'S VISIT:  - We are happy you are feeling better, but you still have a very low hemoglobin level.  - We understand you do not want to be admitted tonight, but also want you to understand that a low hemoglobin level can be dangerous and need you to contact your usual providers in the morning to discuss next steps being you don't want to stay tonight.   - Please know that you are always welcome, and we encourage you to immediately return should you change your mind and want to be admitted.    - Additionally, we encourage you to immediately return to the nearest Emergency Department with any new or worsening symptoms or any concerns whatsoever.  You should discuss all imaging/radiology tests and laboratory tests that were performed during this visit with your usual providers to ensure you continue to improve and do not need any further evaluation, testing or management.   - Please call your Primary Care team to discuss and arrange a follow-up appointment.     FOLLOW-UP:  Please make an appointment to follow up with:  - Your usual Hematologist & Primary Care Provider. Call in the morning to discuss your symptoms and make a next plan for follow-up and management.   - If you do not have a primary care provider, you can be seen in follow-up and establish care with one of our providers by calling of the the clinics below:  --- Primary Care Center (phone: [phone])  --- Primary Care / [facility] (phone: [phone])   - Have your provider review the results from today's visit with you again to make sure no further follow-up or additional testing is needed based on those results.     PRESCRIPTIONS / MEDICATIONS:  - Discuss ongoing pain needs/medications with your usual Hematology/medical providers.     OTHER INSTRUCTIONS:  - Do your best to stay hydrated.    RETURN TO THE EMERGENCY DEPARTMENT  Return to the Emergency Department immediately for any new or worsening symptoms or any concerns.      Remember that you can always come back to the Emergency Department if you are not able to see your regular doctor in the amount of time listed above, if you get any new symptoms, or if there is anything that worries you.

## 2021-02-22 NOTE — ED TRIAGE NOTES
Patient comes in from home. Reports sickle pain flare since being in the ER last week for a blood transfusion. Has been taking regular home pain meds without relief. Denies SOB, weakness and chest pain.

## 2021-02-22 NOTE — PROGRESS NOTES
Oncology/Hematology Visit Note  Feb 24, 2021    Reason for Visit: Follow up of sickle cell hgbSS disease     History of Present Illness: HgbSS complicated by frequent pain crises (acute and chronic components), history of stroke leading to significant cognitive delays and right upper extremity hemiparesis, iron overload 2/2 chronic transfusions as secondary ppx post-CVA, anxiety/depression, asthma. Please see Dr. Duncan's detailed note from 2/28/20 for complete hematologic history. She is currently on Hydrea and Jadenu.     She was admitted 2/1/21-2/3/21 with a new PE, started on Rivaroxaban.      She was seen today for hematology follow-up.     Interval History:  Jennifer was seen today for hematology follow-up. She is feeling better but still having pain in her back. She feels like the infusion yesterday was helpful. She does note the 15mg Oxycodone is more helpful than the 10mg so she is happy with this change. She still gets intermittent pleuritic pain but overall it is improving. She denies any SOB, though had wheezing yesterday that resolved. No cough or fevers. No headaches, dizziness, vomiting, abdominal pain, bowel or bladder concerns. She does get nausea/gagging with the Jadenu as they are large pills but she is taking them. Eating and drinking OK. She is really trying to focus on her health, doing non-medication things at home like warm baths, bath salts, heated blankets, aromatherapy to help with pain.    Of note she has noted easy bruising and intermittent gum bleeding since being on Xarelto.     Current Outpatient Medications   Medication Sig Dispense Refill     acetaminophen (TYLENOL) 325 MG tablet Take 2 tablets (650 mg) by mouth every 6 hours as needed for mild pain (Patient taking differently: Take 325-650 mg by mouth every 6 hours as needed for mild pain ) 120 tablet 3     albuterol (PROAIR HFA/PROVENTIL HFA/VENTOLIN HFA) 108 (90 Base) MCG/ACT inhaler Inhale 2 puffs into the lungs every 6 hours as  needed for shortness of breath / dyspnea or wheezing 8.5 g 3     albuterol (PROVENTIL) (2.5 MG/3ML) 0.083% neb solution Take 1 vial (2.5 mg) by nebulization every 6 hours as needed for shortness of breath / dyspnea or wheezing 12 mL 4     aspirin (ASPIRIN) 81 MG EC tablet Take 1 tablet (81 mg) by mouth daily . HOLD while on Xarelto 90 tablet 3     budesonide-formoterol (SYMBICORT) 160-4.5 MCG/ACT Inhaler Inhale 2 puffs into the lungs 2 times daily 10.2 g 3     celecoxib (CELEBREX) 100 MG capsule Take 1 capsule (100 mg) by mouth 2 times daily 60 capsule 3     diphenhydrAMINE (BENADRYL) 25 MG capsule Take 1-2 capsules (25-50 mg) by mouth nightly as needed for sleep 60 capsule 3     Hydroxyurea 1000 MG TABS Take 2,000 mg by mouth daily (Patient taking differently: Take 2,000 mg by mouth every evening ) 60 tablet 3     JADENU 360 MG tablet Take 4 tablets (1,440 mg) by mouth daily (Patient taking differently: Take 1,440 mg by mouth every evening ) 120 tablet 4     medroxyPROGESTERone (DEPO-PROVERA) 150 MG/ML IM injection Inject 150 mg into the muscle       naloxone (NARCAN) 4 MG/0.1ML nasal spray Spray 1 spray (4 mg) into one nostril alternating nostrils once as needed for opioid reversal every 2-3 minutes until assistance arrives 0.2 mL 1     ondansetron (ZOFRAN) 8 MG tablet        oxyCODONE IR (ROXICODONE) 15 MG tablet Take 1 tablet (15 mg) by mouth every 6 hours as needed for severe pain 60 tablet 0     rivaroxaban ANTICOAGULANT (XARELTO) 15 MG TABS tablet Take 1 tablet (15 mg) by mouth 2 times daily (with meals) For 21 days 54 tablet 0     [START ON 2/23/2021] rivaroxaban ANTICOAGULANT (XARELTO) 20 MG TABS tablet Take 1 tablet (20 mg) by mouth daily (with dinner) 63 tablet 0     sertraline (ZOLOFT) 100 MG tablet Take 2 tablets (200 mg) by mouth daily 180 tablet 3     Physical Examination:  /81 (BP Location: Left arm, Patient Position: Sitting, Cuff Size: Adult Regular)   Pulse 118   Temp 98.5  F (36.9  C)  "(Tympanic)   Resp 16   Ht 1.626 m (5' 4\")   Wt 73.5 kg (162 lb 1.6 oz)   SpO2 96%   BMI 27.82 kg/m    Wt Readings from Last 10 Encounters:   02/19/21 74.3 kg (163 lb 14.4 oz)   02/18/21 73.5 kg (162 lb)   02/17/21 72.1 kg (159 lb)   02/10/21 76.4 kg (168 lb 8 oz)   02/09/21 74.8 kg (164 lb 12.8 oz)   02/02/21 73 kg (161 lb)   01/29/21 73 kg (161 lb)   01/27/21 73.2 kg (161 lb 4.8 oz)   01/22/21 73.5 kg (162 lb)   01/21/21 73.5 kg (162 lb)     Constitutional: Well-appearing female in no acute distress.  Eyes: EOMI, PERRL. No scleral icterus.  ENT: Oral mucosa is moist without lesions or thrush.   Lymphatic: Neck is supple without cervical or supraclavicular lymphadenopathy.   Cardiovascular: Slightly tachycardic rate and regular rhythm. No murmurs, gallops, or rubs. No peripheral edema.  Respiratory: Clear to auscultation bilaterally. No wheezes or crackles.  Gastrointestinal: Bowel sounds present. Abdomen soft, non-tender. No palpable hepatosplenomegaly or masses.   Neurologic: Cranial nerves II through XII are grossly intact. Sequela of stroke with right side weakness.  Skin: No rashes, petechiae, or bruising noted on exposed skin.    Laboratory Data:  Results for HIMANSHU AL (MRN [id number]) as of 2/24/2021 11:10   2/23/2021 16:20   Sodium 142   Potassium 3.5   Chloride 112 (H)   Carbon Dioxide 26   Urea Nitrogen 5 (L)   Creatinine 0.60   GFR Estimate >90   GFR Estimate If Black >90   Calcium 8.1 (L)   Anion Gap 5   Albumin 3.2 (L)   Protein Total 8.2   Bilirubin Total 1.1   Alkaline Phosphatase 58   ALT 46   AST 37   Ferritin 5,681 (H)   Glucose 107 (H)   WBC 9.5   Hemoglobin 6.7 (LL)   Hematocrit 20.4 (L)   Platelet Count 498 (H)   RBC Count 2.49 (L)   MCV 82   MCH 26.9   MCHC 32.8   RDW 21.5 (H)   Diff Method Automated Method   % Neutrophils 61.2   % Lymphocytes 26.3   % Monocytes 4.9   % Eosinophils 5.8   % Basophils 1.4   % Immature Granulocytes 0.4   Nucleated RBCs 2 (H)   Absolute Neutrophil 5.8 "   Absolute Lymphocytes 2.5   Absolute Monocytes 0.5   Absolute Eosinophils 0.6   Absolute Basophils 0.1   Abs Immature Granulocytes 0.0   Absolute Nucleated RBC 0.2   % Retic 9.5 (H)   Absolute Retic 235.3 (H)   ABO O   RH(D) Pos   Antibody Screen Neg   Test Valid Only At [facility]   Specimen Expires 02/26/2021       Assessment and Plan:  1. Sickle Cell Disease  HgbSS, has been having more issues with pain recently with more ED visits and recent admission for pain and PE. She has been in an acute crisis the last two weeks as evident by worsening pain and anemia, did require 1 unit pRBC in ED. Symptoms finally starting to improve as is hgb. No concern for acute chest or other complications from crisis.     Will repeat IV fluids and IV morphine today to assist with ongoing back pain, then she should be able to manage at home by report.       Labs: Labs with improvement in anemia, was 6.1 now 6.7 without any transfusion support. Continue monitoring. LFT elevation resolved.     Meds: Stopped Voxeletor. Continue Hydrea 2000mg daily. Contineu Jadenu 4 tablets daily.     Pain Control: Now off OxyContin. Recently increased Oxycodone to 15mg PRN and this is working well. Continue Tylenol PRN, and Celebrex PRN. Off Naproxen. Try to take less Ibuprofen while on Xarelto. Avoid long acting narcotics.      Follow-up: Will request visit in 2 weeks with myself. Has Dr. Duncan in one month.     2. Neuro  History of stroke, no new concerns. ASA on hold while on anticoagulation for PE. No new neuro concerns.     3. Psych  History of anxiety and depression. Continue Sertraline. Does well with consistent care-will try to make sure she can follow with myself and Dr. Duncan. Not discussed today, though mood is bright.     Continue Benadryl PRN for insomnia, not discussed.     4. Pulm  History of asthma, continue Symbicort and Albuterol PRN. Will re-request pulm visit (she was set up for  video visit but then never got a call).     Bilateral PE, diagnosed 2/1/21, now on Rivaroxaban 20mg daily. Still having intermittent pleuritic chest pain, improving, but no persistent pain, SOB, hypoxia, cough to warrant repeat imaging at this time. Will be on Rivaroxaban for 3 months. Discussed monitoring bruising/gum bleeding now that she is on once daily dose.    Sickle Cell Health Maintenance     1. Immunizations  -Meningococcal: Up to date  -Pneumonia vaccinations: Up to date  -Annual flu shot: Mot discussed      2. Infection Screening  -Hepatitis C screening for high-risk (I.e. multiple transfusions) and/or born between 0809-9175. Checked 1/13/21 and negative.      3. Eyes  -Annual ophthalmology visit even if prior normal testing. Will need sometime this year.     4. Cardiopulmonary  -No recommended EKG, echo, or pulmonary testing routinely however should perform if symptomatic. No current cardiac or respiratory symptoms. Echo in ED WNL.  -Strongly consider echo and sleep study if symptoms or signs of pulm HTN/sleep apnea (exercise intolerance, fatigue, weight gain, edema, SOB). no current symptoms.       5. Renal  -Annual UA to screen for microalbuminuria/proteinuria. Last UA performed August 2020 and negative.   -If proteinuria (>300g/24 hr) present, refer to nephrology  -If microalbuminuria (30-300g/24 hr) present, initiate low dose ACE-inhibitor therapy, even with normal BP     6. MSK  -Imaging (XR, MRI) if symptoms raise concerns for AVN. Refer to PT and/or ortho if AVN changes are seen. No concerns.     7. Iron Overload  -10+ transfusions lifetime are at high risk of iron overload  -Annual ferritin monitoring for chronically-transfused patients. On Jadenu, ferritin down to 5K. Will discuss Desferal with Dr. Duncan.   -If signs of iron overload, annual cardiac MRI and LFT monitoring. Will need to reschedule, not discussed today.   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 Sent to pharmacy.     Andrei Machado PA-C  [facility]  [address] [place] [id number] [phone]

## 2021-02-23 ENCOUNTER — TELEPHONE (OUTPATIENT)
Dept: ONCOLOGY | Facility: CLINIC | Age: 21
End: 2021-02-23

## 2021-02-23 ENCOUNTER — INFUSION THERAPY VISIT (OUTPATIENT)
Dept: ONCOLOGY | Facility: CLINIC | Age: 21
End: 2021-02-23
Attending: PEDIATRICS
Payer: COMMERCIAL

## 2021-02-23 VITALS
RESPIRATION RATE: 18 BRPM | HEART RATE: 103 BPM | OXYGEN SATURATION: 97 % | TEMPERATURE: 98.1 F | SYSTOLIC BLOOD PRESSURE: 135 MMHG | DIASTOLIC BLOOD PRESSURE: 76 MMHG

## 2021-02-23 DIAGNOSIS — D57.00 SICKLE CELL PAIN CRISIS (H): Primary | ICD-10-CM

## 2021-02-23 DIAGNOSIS — Z86.73 HISTORY OF STROKE: ICD-10-CM

## 2021-02-23 DIAGNOSIS — D57.1 HB-SS DISEASE WITHOUT CRISIS (H): ICD-10-CM

## 2021-02-23 LAB
ABO + RH BLD: NORMAL
ABO + RH BLD: NORMAL
ALBUMIN SERPL-MCNC: 3.2 G/DL (ref 3.4–5)
ALP SERPL-CCNC: 58 U/L (ref 40–150)
ALT SERPL W P-5'-P-CCNC: 46 U/L (ref 0–50)
ANION GAP SERPL CALCULATED.3IONS-SCNC: 5 MMOL/L (ref 3–14)
AST SERPL W P-5'-P-CCNC: 37 U/L (ref 0–45)
BASOPHILS # BLD AUTO: 0.1 10E9/L (ref 0–0.2)
BASOPHILS NFR BLD AUTO: 1.4 %
BILIRUB SERPL-MCNC: 1.1 MG/DL (ref 0.2–1.3)
BLD GP AB SCN SERPL QL: NORMAL
BLOOD BANK CMNT PATIENT-IMP: NORMAL
BUN SERPL-MCNC: 5 MG/DL (ref 7–30)
CALCIUM SERPL-MCNC: 8.1 MG/DL (ref 8.5–10.1)
CHLORIDE SERPL-SCNC: 112 MMOL/L (ref 94–109)
CO2 SERPL-SCNC: 26 MMOL/L (ref 20–32)
CREAT SERPL-MCNC: 0.6 MG/DL (ref 0.52–1.04)
DIFFERENTIAL METHOD BLD: ABNORMAL
EOSINOPHIL # BLD AUTO: 0.6 10E9/L (ref 0–0.7)
EOSINOPHIL NFR BLD AUTO: 5.8 %
ERYTHROCYTE [DISTWIDTH] IN BLOOD BY AUTOMATED COUNT: 21.5 % (ref 10–15)
FERRITIN SERPL-MCNC: 5681 NG/ML (ref 12–150)
GFR SERPL CREATININE-BSD FRML MDRD: >90 ML/MIN/{1.73_M2}
GLUCOSE SERPL-MCNC: 107 MG/DL (ref 70–99)
HCT VFR BLD AUTO: 20.4 % (ref 35–47)
HGB BLD-MCNC: 6.7 G/DL (ref 11.7–15.7)
IMM GRANULOCYTES # BLD: 0 10E9/L (ref 0–0.4)
IMM GRANULOCYTES NFR BLD: 0.4 %
LYMPHOCYTES # BLD AUTO: 2.5 10E9/L (ref 0.8–5.3)
LYMPHOCYTES NFR BLD AUTO: 26.3 %
MCH RBC QN AUTO: 26.9 PG (ref 26.5–33)
MCHC RBC AUTO-ENTMCNC: 32.8 G/DL (ref 31.5–36.5)
MCV RBC AUTO: 82 FL (ref 78–100)
MONOCYTES # BLD AUTO: 0.5 10E9/L (ref 0–1.3)
MONOCYTES NFR BLD AUTO: 4.9 %
NEUTROPHILS # BLD AUTO: 5.8 10E9/L (ref 1.6–8.3)
NEUTROPHILS NFR BLD AUTO: 61.2 %
NRBC # BLD AUTO: 0.2 10*3/UL
NRBC BLD AUTO-RTO: 2 /100
PLATELET # BLD AUTO: 498 10E9/L (ref 150–450)
POTASSIUM SERPL-SCNC: 3.5 MMOL/L (ref 3.4–5.3)
PROT SERPL-MCNC: 8.2 G/DL (ref 6.8–8.8)
RBC # BLD AUTO: 2.49 10E12/L (ref 3.8–5.2)
RETICS # AUTO: 235.3 10E9/L (ref 25–95)
RETICS/RBC NFR AUTO: 9.5 % (ref 0.5–2)
SODIUM SERPL-SCNC: 142 MMOL/L (ref 133–144)
SPECIMEN EXP DATE BLD: NORMAL
WBC # BLD AUTO: 9.5 10E9/L (ref 4–11)

## 2021-02-23 PROCEDURE — 96374 THER/PROPH/DIAG INJ IV PUSH: CPT

## 2021-02-23 PROCEDURE — 86850 RBC ANTIBODY SCREEN: CPT | Performed by: PEDIATRICS

## 2021-02-23 PROCEDURE — 250N000011 HC RX IP 250 OP 636: Performed by: PEDIATRICS

## 2021-02-23 PROCEDURE — 80053 COMPREHEN METABOLIC PANEL: CPT | Performed by: PHYSICIAN ASSISTANT

## 2021-02-23 PROCEDURE — 85025 COMPLETE CBC W/AUTO DIFF WBC: CPT | Performed by: PHYSICIAN ASSISTANT

## 2021-02-23 PROCEDURE — 96361 HYDRATE IV INFUSION ADD-ON: CPT

## 2021-02-23 PROCEDURE — 86900 BLOOD TYPING SEROLOGIC ABO: CPT | Performed by: PEDIATRICS

## 2021-02-23 PROCEDURE — 85045 AUTOMATED RETICULOCYTE COUNT: CPT | Performed by: PHYSICIAN ASSISTANT

## 2021-02-23 PROCEDURE — 86901 BLOOD TYPING SEROLOGIC RH(D): CPT | Performed by: PEDIATRICS

## 2021-02-23 PROCEDURE — 82728 ASSAY OF FERRITIN: CPT | Performed by: PHYSICIAN ASSISTANT

## 2021-02-23 PROCEDURE — 96376 TX/PRO/DX INJ SAME DRUG ADON: CPT

## 2021-02-23 PROCEDURE — 250N000011 HC RX IP 250 OP 636: Performed by: PHYSICIAN ASSISTANT

## 2021-02-23 PROCEDURE — 258N000003 HC RX IP 258 OP 636: Performed by: PHYSICIAN ASSISTANT

## 2021-02-23 RX ORDER — HEPARIN SODIUM,PORCINE 10 UNIT/ML
5 VIAL (ML) INTRAVENOUS
Status: DISCONTINUED | OUTPATIENT
Start: 2021-02-23 | End: 2021-02-23 | Stop reason: HOSPADM

## 2021-02-23 RX ORDER — ONDANSETRON 8 MG/1
8 TABLET, ORALLY DISINTEGRATING ORAL
Status: DISCONTINUED | OUTPATIENT
Start: 2021-02-23 | End: 2021-02-23 | Stop reason: HOSPADM

## 2021-02-23 RX ORDER — HEPARIN SODIUM (PORCINE) LOCK FLUSH IV SOLN 100 UNIT/ML 100 UNIT/ML
5 SOLUTION INTRAVENOUS
Status: DISCONTINUED | OUTPATIENT
Start: 2021-02-23 | End: 2021-02-23 | Stop reason: HOSPADM

## 2021-02-23 RX ORDER — DIPHENHYDRAMINE HCL 25 MG
25 CAPSULE ORAL
Status: DISCONTINUED | OUTPATIENT
Start: 2021-02-23 | End: 2021-02-23 | Stop reason: HOSPADM

## 2021-02-23 RX ORDER — MORPHINE SULFATE 2 MG/ML
2 INJECTION, SOLUTION INTRAMUSCULAR; INTRAVENOUS
Status: CANCELLED
Start: 2021-02-23

## 2021-02-23 RX ORDER — HEPARIN SODIUM,PORCINE 10 UNIT/ML
5 VIAL (ML) INTRAVENOUS
Status: CANCELLED | OUTPATIENT
Start: 2021-02-23

## 2021-02-23 RX ORDER — MORPHINE SULFATE 2 MG/ML
2 INJECTION, SOLUTION INTRAMUSCULAR; INTRAVENOUS
Status: DISCONTINUED | OUTPATIENT
Start: 2021-02-23 | End: 2021-02-23 | Stop reason: HOSPADM

## 2021-02-23 RX ORDER — HEPARIN SODIUM (PORCINE) LOCK FLUSH IV SOLN 100 UNIT/ML 100 UNIT/ML
5 SOLUTION INTRAVENOUS
Status: CANCELLED | OUTPATIENT
Start: 2021-02-23

## 2021-02-23 RX ORDER — DIPHENHYDRAMINE HCL 25 MG
25 CAPSULE ORAL
Status: CANCELLED
Start: 2021-02-23

## 2021-02-23 RX ORDER — ONDANSETRON 8 MG/1
8 TABLET, FILM COATED ORAL
Status: CANCELLED
Start: 2021-02-23

## 2021-02-23 RX ADMIN — MORPHINE SULFATE 2 MG: 2 INJECTION, SOLUTION INTRAMUSCULAR; INTRAVENOUS at 15:46

## 2021-02-23 RX ADMIN — MORPHINE SULFATE 2 MG: 2 INJECTION, SOLUTION INTRAMUSCULAR; INTRAVENOUS at 13:03

## 2021-02-23 RX ADMIN — MORPHINE SULFATE 2 MG: 2 INJECTION, SOLUTION INTRAMUSCULAR; INTRAVENOUS at 14:25

## 2021-02-23 RX ADMIN — DEXTROSE AND SODIUM CHLORIDE 500 ML: 5; 450 INJECTION, SOLUTION INTRAVENOUS at 13:02

## 2021-02-23 RX ADMIN — Medication 5 ML: at 16:22

## 2021-02-23 ASSESSMENT — PAIN SCALES - GENERAL: PAINLEVEL: EXTREME PAIN (9)

## 2021-02-23 NOTE — PROGRESS NOTES
Infusion Nursing Note:  Jennifer Cervantes presents for add on IVF/pain medication    Note: pt arrives today complaining of her regular sickle cell pain in her back, denies any chest pain, fevers or shortness of breath. Pain at a 9/10 when she arrived, 2mg IV morphine given X3 per orders. Pain at a 5 after last dose, pt feels well enough to discharge. No labs ordered today, pt to see Andrei and get labs tomorrow.    TORB- RONALDO Allan/Afshan Diop RN  --draw labs today and order type and screen in case she does need PRBC tomorrow or later this week, pt does not need to wait for lab results today     Pain: see above    Treatment Conditions:  Not Applicable.    Intravenous Access:  Implanted Port.    Post Infusion Assessment:  Patient tolerated infusion without incident.  Blood return noted pre and post infusion.  No evidence of extravasations.  Access discontinued per protocol.    Discharge Plan:   Patient declined prescription refills.  Discharge instructions reviewed with: Patient.  Patient and/or family verbalized understanding of discharge instructions and all questions answered.  AVS to patient via AppZeroT.  Patient will return tomorrow for next appointment with Andrei  Patient discharged in stable condition accompanied by: self.    Asfhan Diop, RN, RN

## 2021-02-23 NOTE — TELEPHONE ENCOUNTER
Pt scheduled for [facility] infusion at 1230, she accepted and stated she will set up her own ride. Scheduling messaged.

## 2021-02-23 NOTE — TELEPHONE ENCOUNTER
Pt called in to triage requesting IVF pain meds for back pain rated 8.5/10 since last night. Stated last took prn Oxy 15 mg, Celebrex, Naproxen at 6 am and 2-3 am this morning without relief. Denied any fevers, chills, cough, sob, chest pain or other symptoms. Pt's last infusion was 2/19, last clinic visit 2/5 with follow-up on 2/24 with Andrei HIGGINS. Pt denied being out of home medications and needing any refills today. Pt qualifies for sickle cell standing order protocol.

## 2021-02-24 ENCOUNTER — ONCOLOGY VISIT (OUTPATIENT)
Dept: ONCOLOGY | Facility: CLINIC | Age: 21
End: 2021-02-24
Attending: PEDIATRICS
Payer: COMMERCIAL

## 2021-02-24 ENCOUNTER — INFUSION THERAPY VISIT (OUTPATIENT)
Dept: ONCOLOGY | Facility: CLINIC | Age: 21
End: 2021-02-24
Attending: PHYSICIAN ASSISTANT
Payer: COMMERCIAL

## 2021-02-24 VITALS
TEMPERATURE: 98.5 F | HEIGHT: 64 IN | DIASTOLIC BLOOD PRESSURE: 81 MMHG | HEART RATE: 118 BPM | BODY MASS INDEX: 27.68 KG/M2 | RESPIRATION RATE: 16 BRPM | OXYGEN SATURATION: 96 % | WEIGHT: 162.1 LBS | SYSTOLIC BLOOD PRESSURE: 134 MMHG

## 2021-02-24 VITALS
TEMPERATURE: 98 F | SYSTOLIC BLOOD PRESSURE: 132 MMHG | DIASTOLIC BLOOD PRESSURE: 88 MMHG | HEART RATE: 112 BPM | RESPIRATION RATE: 14 BRPM

## 2021-02-24 DIAGNOSIS — T78.2XXS ANAPHYLAXIS, SEQUELA: ICD-10-CM

## 2021-02-24 DIAGNOSIS — D57.00 SICKLE CELL PAIN CRISIS (H): Primary | ICD-10-CM

## 2021-02-24 DIAGNOSIS — E83.111 IRON OVERLOAD DUE TO REPEATED RED BLOOD CELL TRANSFUSIONS: ICD-10-CM

## 2021-02-24 DIAGNOSIS — D57.00 SICKLE CELL CRISIS (H): Primary | ICD-10-CM

## 2021-02-24 DIAGNOSIS — J45.40 MODERATE PERSISTENT ASTHMA WITHOUT COMPLICATION: ICD-10-CM

## 2021-02-24 PROCEDURE — 250N000011 HC RX IP 250 OP 636: Performed by: PHYSICIAN ASSISTANT

## 2021-02-24 PROCEDURE — G0463 HOSPITAL OUTPT CLINIC VISIT: HCPCS

## 2021-02-24 PROCEDURE — 99214 OFFICE O/P EST MOD 30 MIN: CPT | Performed by: PHYSICIAN ASSISTANT

## 2021-02-24 PROCEDURE — 96374 THER/PROPH/DIAG INJ IV PUSH: CPT

## 2021-02-24 PROCEDURE — 258N000003 HC RX IP 258 OP 636: Performed by: PHYSICIAN ASSISTANT

## 2021-02-24 PROCEDURE — 96376 TX/PRO/DX INJ SAME DRUG ADON: CPT

## 2021-02-24 PROCEDURE — 250N000011 HC RX IP 250 OP 636: Performed by: PEDIATRICS

## 2021-02-24 PROCEDURE — 96361 HYDRATE IV INFUSION ADD-ON: CPT

## 2021-02-24 RX ORDER — ONDANSETRON 8 MG/1
8 TABLET, ORALLY DISINTEGRATING ORAL
Status: DISCONTINUED | OUTPATIENT
Start: 2021-02-24 | End: 2021-02-24 | Stop reason: HOSPADM

## 2021-02-24 RX ORDER — DIPHENHYDRAMINE HCL 25 MG
25 CAPSULE ORAL
Status: CANCELLED
Start: 2021-02-24

## 2021-02-24 RX ORDER — HEPARIN SODIUM (PORCINE) LOCK FLUSH IV SOLN 100 UNIT/ML 100 UNIT/ML
5 SOLUTION INTRAVENOUS
Status: CANCELLED | OUTPATIENT
Start: 2021-02-24

## 2021-02-24 RX ORDER — MORPHINE SULFATE 2 MG/ML
2 INJECTION, SOLUTION INTRAMUSCULAR; INTRAVENOUS
Status: CANCELLED
Start: 2021-02-24

## 2021-02-24 RX ORDER — ONDANSETRON 8 MG/1
8 TABLET, FILM COATED ORAL
Status: CANCELLED
Start: 2021-02-24

## 2021-02-24 RX ORDER — HEPARIN SODIUM (PORCINE) LOCK FLUSH IV SOLN 100 UNIT/ML 100 UNIT/ML
5 SOLUTION INTRAVENOUS
Status: DISCONTINUED | OUTPATIENT
Start: 2021-02-24 | End: 2021-02-24 | Stop reason: HOSPADM

## 2021-02-24 RX ORDER — DIPHENHYDRAMINE HCL 25 MG
25 CAPSULE ORAL
Status: DISCONTINUED | OUTPATIENT
Start: 2021-02-24 | End: 2021-02-24 | Stop reason: HOSPADM

## 2021-02-24 RX ORDER — EPINEPHRINE 0.3 MG/.3ML
0.3 INJECTION SUBCUTANEOUS PRN
Qty: 1 EACH | Refills: 1 | Status: SHIPPED | OUTPATIENT
Start: 2021-02-24

## 2021-02-24 RX ORDER — HEPARIN SODIUM,PORCINE 10 UNIT/ML
5 VIAL (ML) INTRAVENOUS
Status: CANCELLED | OUTPATIENT
Start: 2021-02-24

## 2021-02-24 RX ORDER — MORPHINE SULFATE 2 MG/ML
2 INJECTION, SOLUTION INTRAMUSCULAR; INTRAVENOUS
Status: DISCONTINUED | OUTPATIENT
Start: 2021-02-24 | End: 2021-02-24 | Stop reason: HOSPADM

## 2021-02-24 RX ADMIN — MORPHINE SULFATE 2 MG: 2 INJECTION, SOLUTION INTRAMUSCULAR; INTRAVENOUS at 13:32

## 2021-02-24 RX ADMIN — Medication 5 ML: at 13:47

## 2021-02-24 RX ADMIN — MORPHINE SULFATE 2 MG: 2 INJECTION, SOLUTION INTRAMUSCULAR; INTRAVENOUS at 11:30

## 2021-02-24 RX ADMIN — DEXTROSE AND SODIUM CHLORIDE 500 ML: 5; 450 INJECTION, SOLUTION INTRAVENOUS at 11:30

## 2021-02-24 RX ADMIN — MORPHINE SULFATE 2 MG: 2 INJECTION, SOLUTION INTRAMUSCULAR; INTRAVENOUS at 12:32

## 2021-02-24 ASSESSMENT — MIFFLIN-ST. JEOR: SCORE: 1485.28

## 2021-02-24 ASSESSMENT — PAIN SCALES - GENERAL: PAINLEVEL: EXTREME PAIN (8)

## 2021-02-24 NOTE — NURSING NOTE
"Oncology Rooming Note    February 24, 2021 10:26 AM   Jennifer Cervantes is a 20 year old female who presents for:    Chief Complaint   Patient presents with     Oncology Clinic Visit     Return: Hb-SS disease without crisis      Initial Vitals: /81 (BP Location: Left arm, Patient Position: Sitting, Cuff Size: Adult Regular)   Pulse 118   Temp 98.5  F (36.9  C) (Tympanic)   Resp 16   Ht 1.626 m (5' 4\")   Wt 73.5 kg (162 lb 1.6 oz)   SpO2 96%   BMI 27.82 kg/m   Estimated body mass index is 27.82 kg/m  as calculated from the following:    Height as of this encounter: 1.626 m (5' 4\").    Weight as of this encounter: 73.5 kg (162 lb 1.6 oz). Body surface area is 1.82 meters squared.  Extreme Pain (8) Comment: Data Unavailable   No LMP recorded. Patient has had an injection.  Allergies reviewed: Yes  Medications reviewed: Yes    Medications: Medication refills not needed today.  Pharmacy name entered into EPIC:    [facility] - [place], [place] - [address] [facility] #[id number] - [facility] [address] [facility] [address] SEC OF [facility] - [place], [place] - [address] [facility] [phone]  [facility] #[id number] - [facility] [address] [id number] [address] [facility] [place] [address] [facility] [address] [place] [address] [facility] - [place], [place] - [address] [place] [address]    Clinical concerns: N/A       Shavonne Guerrero CMA              "

## 2021-02-24 NOTE — PROGRESS NOTES
Infusion Nursing Note:  Jennifer Cervantes presents today for IVF/pain meds.    Patient seen by provider today: Yes: Andrei HIGGINS   present during visit today: Not Applicable.    Note: Patient presents today with 9/10 low back pain. Heat and cold therapy offered while in infusion but declined. Patient denies acute complaints or concerns not addressed during Andrei HIGGINS's visit today.  .  Patient did meet criteria for an asymptomatic covid-19 PCR test in infusion today. Patient declined the covid-19 test.    Intravenous Access:  Implanted Port.    Treatment Conditions:  Not Applicable.      Post Infusion Assessment:  Patient tolerated infusion without incident.  Post 500mls of IVF's and 3 doses of IV Morphine, patient rates pain 6/10 and is comfortable going home.   Blood return noted pre and post infusion.  Site patent and intact, free from redness, edema or discomfort.  No evidence of extravasations.  Access discontinued per protocol.       Discharge Plan:   Prescription refills given for Epi pen.  Discharge instructions reviewed with: Patient.  Patient and/or family verbalized understanding of discharge instructions and all questions answered.  AVS to patient via Covercake.  Patient will return as needed for next appointment for pain meds and 3/26 for Dr. Duncan appointment  Patient discharged in stable condition accompanied by: self.  Departure Mode: Ambulatory.  Face to Face time: 0 minutes.    Rik Ayers RN

## 2021-02-24 NOTE — PATIENT INSTRUCTIONS
[facility] Triage and after hours / weekends / holidays:  [phone]    Please call the triage or after hours line if you experience a temperature greater than or equal to 100.5, shaking chills, have uncontrolled nausea, vomiting and/or diarrhea, dizziness, shortness of breath, chest pain, bleeding, unexplained bruising, or if you have any other new/concerning symptoms, questions or concerns.      If you are having any concerning symptoms or wish to speak to a provider before your next infusion visit, please call your care coordinator or triage to notify them so we can adequately serve you.     If you need a refill on a narcotic prescription or other medication, please call before your infusion appointment.                 February 2021 Sunday Monday Tuesday Wednesday Thursday Friday Saturday        1    NM LUNG SCAN VENT/PERF   1:00 AM   (90 min.)   [email or web address]   [facility]    Admission   9:36 PM   Pedro Tinajero DO   [facility] [address] [place]   (Discharge: 2/3/2021) 2    XR CHEST PORT 1 VIEW   8:10 AM   (20 min.)   [id number]   [facility]    ECHO COMPLETE   8:20 AM   (60 min.)   [email or web address]   [facility] 3     4     5    VIDEO VISIT RETURN   2:15 PM   (30 min.)   Eric Duncan MD   [facility] 6       7     8     9    [facility] ONC INFUSION 120  12:30 PM   (120 min.)   [facility] ONCOLOGY INFUSION   [facility] 10    LAB CENTRAL  10:45 AM   (15 min.)   [facility] DRAW   [facility] RETURN  10:55 AM   (50 min.)   Andrei Machado PA   [facility] BMT INFUSION 120   1:30 PM   (120 min.)   UC BMT INFUSION   [facility] [place] 11    MR ABDOMEN WO   7:30 AM   (60 min.)   [email or web address]   [facility]    MR MYOCARDIUM WO   9:00 AM    (105 min.)   [id number]   [facility]    VIDEO VISIT NEW   2:45 PM   (30 min.)   Suraj Case MD   [facility] [place]    Admission   8:29 PM   Artie Taylor MD   [facility]   (Discharge: 2/12/2021) 12     13       14     15     16    [facility] ONC INFUSION 120  11:00 AM   (120 min.)   UC ONCOLOGY INFUSION   [facility] 17    LAB CENTRAL  11:45 AM   (15 min.)   [facility] DRAW   [facility] ONC INFUSION 120  12:30 PM   (120 min.)   [facility] ONCOLOGY INFUSION   [facility]    VIDEO VISIT NEW   3:20 PM   (80 min.)   Rosalva Gasca MD   [facility] 18    Admission   8:54 AM   Cornelia Malloy MD   [facility]   (Discharge: 2/18/2021)    ECHO COMPLETE   9:20 AM   (60 min.)   [id number]   [facility]    XR CHEST 2 VIEWS   9:25 AM   (15 min.)   [email or web address]   [facility] 19    LAB PERIPHERAL  11:00 AM   (15 min.)   [facility] DRAW   [facility] ONC INFUSION 120  11:30 AM   (120 min.)   [facility] ONCOLOGY INFUSION   [facility] 20       21    Admission   8:54 PM   Reta Miramontes MD   [facility]   (Discharge: 2/22/2021)    XR CHEST 2 VIEWS  10:15 PM   (15 min.)   [id number]   [facility] 22     23    [facility] ONC INFUSION 120  12:30 PM   (120 min.)   UC ONCOLOGY INFUSION   [facility] 24    [facility] RETURN  10:05 AM   (50 min.)   Andrei Machado PA   [facility] ONC INFUSION 120  11:00 AM   (120 min.)   UC ONCOLOGY INFUSION   [facility] 25     26     27       28                                                March 2021 Sunday Monday Tuesday Wednesday Thursday Friday Saturday        1     2     3     4  Happy Birthday!     5     6       7     8     9     10     11     12     13       14     15     16     17     18     19     20       21     22     23     24     25     26    LAB CENTRAL  12:30 PM   (15 min.)   [facility] LAB DRAW   [facility]    [facility] RETURN  12:45 PM   (30 min.)   Eric Duncan MD   [facility] 27       28     29     30     31                                    Lab Results:  No results found for this or any previous visit (from the past 12 hour(s)).

## 2021-02-24 NOTE — LETTER
2/24/2021         RE: Jennifer NEWMAN Billy  [address]  [facility] [address]      Oncology/Hematology Visit Note  Feb 24, 2021    Reason for Visit: Follow up of sickle cell hgbSS disease     History of Present Illness: HgbSS complicated by frequent pain crises (acute and chronic components), history of stroke leading to significant cognitive delays and right upper extremity hemiparesis, iron overload 2/2 chronic transfusions as secondary ppx post-CVA, anxiety/depression, asthma. Please see Dr. Duncan's detailed note from 2/28/20 for complete hematologic history. She is currently on Hydrea and Jadenu.     She was admitted 2/1/21-2/3/21 with a new PE, started on Rivaroxaban.      She was seen today for hematology follow-up.     Interval History:  Jennifer was seen today for hematology follow-up. She is feeling better but still having pain in her back. She feels like the infusion yesterday was helpful. She does note the 15mg Oxycodone is more helpful than the 10mg so she is happy with this change. She still gets intermittent pleuritic pain but overall it is improving. She denies any SOB, though had wheezing yesterday that resolved. No cough or fevers. No headaches, dizziness, vomiting, abdominal pain, bowel or bladder concerns. She does get nausea/gagging with the Jadenu as they are large pills but she is taking them. Eating and drinking OK. She is really trying to focus on her health, doing non-medication things at home like warm baths, bath salts, heated blankets, aromatherapy to help with pain.    Of note she has noted easy bruising and intermittent gum bleeding since being on Xarelto.     Current Outpatient Medications   Medication Sig Dispense Refill     acetaminophen (TYLENOL) 325 MG tablet Take 2 tablets (650 mg) by mouth every 6 hours as needed for mild pain (Patient taking differently: Take 325-650 mg by mouth every 6 hours as needed for mild pain ) 120 tablet 3     albuterol (PROAIR HFA/PROVENTIL  HFA/VENTOLIN HFA) 108 (90 Base) MCG/ACT inhaler Inhale 2 puffs into the lungs every 6 hours as needed for shortness of breath / dyspnea or wheezing 8.5 g 3     albuterol (PROVENTIL) (2.5 MG/3ML) 0.083% neb solution Take 1 vial (2.5 mg) by nebulization every 6 hours as needed for shortness of breath / dyspnea or wheezing 12 mL 4     aspirin (ASPIRIN) 81 MG EC tablet Take 1 tablet (81 mg) by mouth daily . HOLD while on Xarelto 90 tablet 3     budesonide-formoterol (SYMBICORT) 160-4.5 MCG/ACT Inhaler Inhale 2 puffs into the lungs 2 times daily 10.2 g 3     celecoxib (CELEBREX) 100 MG capsule Take 1 capsule (100 mg) by mouth 2 times daily 60 capsule 3     diphenhydrAMINE (BENADRYL) 25 MG capsule Take 1-2 capsules (25-50 mg) by mouth nightly as needed for sleep 60 capsule 3     Hydroxyurea 1000 MG TABS Take 2,000 mg by mouth daily (Patient taking differently: Take 2,000 mg by mouth every evening ) 60 tablet 3     JADENU 360 MG tablet Take 4 tablets (1,440 mg) by mouth daily (Patient taking differently: Take 1,440 mg by mouth every evening ) 120 tablet 4     medroxyPROGESTERone (DEPO-PROVERA) 150 MG/ML IM injection Inject 150 mg into the muscle       naloxone (NARCAN) 4 MG/0.1ML nasal spray Spray 1 spray (4 mg) into one nostril alternating nostrils once as needed for opioid reversal every 2-3 minutes until assistance arrives 0.2 mL 1     ondansetron (ZOFRAN) 8 MG tablet        oxyCODONE IR (ROXICODONE) 15 MG tablet Take 1 tablet (15 mg) by mouth every 6 hours as needed for severe pain 60 tablet 0     rivaroxaban ANTICOAGULANT (XARELTO) 15 MG TABS tablet Take 1 tablet (15 mg) by mouth 2 times daily (with meals) For 21 days 54 tablet 0     [START ON 2/23/2021] rivaroxaban ANTICOAGULANT (XARELTO) 20 MG TABS tablet Take 1 tablet (20 mg) by mouth daily (with dinner) 63 tablet 0     sertraline (ZOLOFT) 100 MG tablet Take 2 tablets (200 mg) by mouth daily 180 tablet 3     Physical Examination:  /81 (BP Location: Left arm,  "Patient Position: Sitting, Cuff Size: Adult Regular)   Pulse 118   Temp 98.5  F (36.9  C) (Tympanic)   Resp 16   Ht 1.626 m (5' 4\")   Wt 73.5 kg (162 lb 1.6 oz)   SpO2 96%   BMI 27.82 kg/m    Wt Readings from Last 10 Encounters:   02/19/21 74.3 kg (163 lb 14.4 oz)   02/18/21 73.5 kg (162 lb)   02/17/21 72.1 kg (159 lb)   02/10/21 76.4 kg (168 lb 8 oz)   02/09/21 74.8 kg (164 lb 12.8 oz)   02/02/21 73 kg (161 lb)   01/29/21 73 kg (161 lb)   01/27/21 73.2 kg (161 lb 4.8 oz)   01/22/21 73.5 kg (162 lb)   01/21/21 73.5 kg (162 lb)     Constitutional: Well-appearing female in no acute distress.  Eyes: EOMI, PERRL. No scleral icterus.  ENT: Oral mucosa is moist without lesions or thrush.   Lymphatic: Neck is supple without cervical or supraclavicular lymphadenopathy.   Cardiovascular: Slightly tachycardic rate and regular rhythm. No murmurs, gallops, or rubs. No peripheral edema.  Respiratory: Clear to auscultation bilaterally. No wheezes or crackles.  Gastrointestinal: Bowel sounds present. Abdomen soft, non-tender. No palpable hepatosplenomegaly or masses.   Neurologic: Cranial nerves II through XII are grossly intact. Sequela of stroke with right side weakness.  Skin: No rashes, petechiae, or bruising noted on exposed skin.    Laboratory Data:  Results for HIMANSHU AL (MRN [id number]) as of 2/24/2021 11:10   2/23/2021 16:20   Sodium 142   Potassium 3.5   Chloride 112 (H)   Carbon Dioxide 26   Urea Nitrogen 5 (L)   Creatinine 0.60   GFR Estimate >90   GFR Estimate If Black >90   Calcium 8.1 (L)   Anion Gap 5   Albumin 3.2 (L)   Protein Total 8.2   Bilirubin Total 1.1   Alkaline Phosphatase 58   ALT 46   AST 37   Ferritin 5,681 (H)   Glucose 107 (H)   WBC 9.5   Hemoglobin 6.7 (LL)   Hematocrit 20.4 (L)   Platelet Count 498 (H)   RBC Count 2.49 (L)   MCV 82   MCH 26.9   MCHC 32.8   RDW 21.5 (H)   Diff Method Automated Method   % Neutrophils 61.2   % Lymphocytes 26.3   % Monocytes 4.9   % Eosinophils 5.8   % " Basophils 1.4   % Immature Granulocytes 0.4   Nucleated RBCs 2 (H)   Absolute Neutrophil 5.8   Absolute Lymphocytes 2.5   Absolute Monocytes 0.5   Absolute Eosinophils 0.6   Absolute Basophils 0.1   Abs Immature Granulocytes 0.0   Absolute Nucleated RBC 0.2   % Retic 9.5 (H)   Absolute Retic 235.3 (H)   ABO O   RH(D) Pos   Antibody Screen Neg   Test Valid Only At [facility]   Specimen Expires 02/26/2021       Assessment and Plan:  1. Sickle Cell Disease  HgbSS, has been having more issues with pain recently with more ED visits and recent admission for pain and PE. She has been in an acute crisis the last two weeks as evident by worsening pain and anemia, did require 1 unit pRBC in ED. Symptoms finally starting to improve as is hgb. No concern for acute chest or other complications from crisis.     Will repeat IV fluids and IV morphine today to assist with ongoing back pain, then she should be able to manage at home by report.       Labs: Labs with improvement in anemia, was 6.1 now 6.7 without any transfusion support. Continue monitoring. LFT elevation resolved.     Meds: Stopped Voxeletor. Continue Hydrea 2000mg daily. Contineu Jadenu 4 tablets daily.     Pain Control: Now off OxyContin. Recently increased Oxycodone to 15mg PRN and this is working well. Continue Tylenol PRN, and Celebrex PRN. Off Naproxen. Try to take less Ibuprofen while on Xarelto. Avoid long acting narcotics.      Follow-up: Will request visit in 2 weeks with myself. Has Dr. Duncan in one month.     2. Neuro  History of stroke, no new concerns. ASA on hold while on anticoagulation for PE. No new neuro concerns.     3. Psych  History of anxiety and depression. Continue Sertraline. Does well with consistent care-will try to make sure she can follow with myself and Dr. Duncan. Not discussed today, though mood is bright.     Continue Benadryl PRN for insomnia, not discussed.     4. Pulm  History of  asthma, continue Symbicort and Albuterol PRN. Will re-request pulm visit (she was set up for video visit but then never got a call).     Bilateral PE, diagnosed 2/1/21, now on Rivaroxaban 20mg daily. Still having intermittent pleuritic chest pain, improving, but no persistent pain, SOB, hypoxia, cough to warrant repeat imaging at this time. Will be on Rivaroxaban for 3 months. Discussed monitoring bruising/gum bleeding now that she is on once daily dose.    Sickle Cell Health Maintenance     1. Immunizations  -Meningococcal: Up to date  -Pneumonia vaccinations: Up to date  -Annual flu shot: Mot discussed      2. Infection Screening  -Hepatitis C screening for high-risk (I.e. multiple transfusions) and/or born between [id number]. Checked 1/13/21 and negative.      3. Eyes  -Annual ophthalmology visit even if prior normal testing. Will need sometime this year.     4. Cardiopulmonary  -No recommended EKG, echo, or pulmonary testing routinely however should perform if symptomatic. No current cardiac or respiratory symptoms. Echo in ED WNL.  -Strongly consider echo and sleep study if symptoms or signs of pulm HTN/sleep apnea (exercise intolerance, fatigue, weight gain, edema, SOB). no current symptoms.       5. Renal  -Annual UA to screen for microalbuminuria/proteinuria. Last UA performed August 2020 and negative.   -If proteinuria (>300g/24 hr) present, refer to nephrology  -If microalbuminuria (30-300g/24 hr) present, initiate low dose ACE-inhibitor therapy, even with normal BP     6. MSK  -Imaging (XR, MRI) if symptoms raise concerns for AVN. Refer to PT and/or ortho if AVN changes are seen. No concerns.     7. Iron Overload  -10+ transfusions lifetime are at high risk of iron overload  -Annual ferritin monitoring for chronically-transfused patients. On Jadenu, ferritin down to 5K. Will discuss Desferal with Dr. Duncan.   -If signs of iron overload, annual cardiac MRI and LFT monitoring. Will need to reschedule, not  discussed today.   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 Sent to pharmacy.     Andrei Machado PA-C  [facility] [address] [address]  [phone]          RONALDO Allan

## 2021-02-26 ENCOUNTER — APPOINTMENT (OUTPATIENT)
Dept: LAB | Facility: CLINIC | Age: 21
End: 2021-02-26
Attending: PEDIATRICS
Payer: COMMERCIAL

## 2021-02-26 ENCOUNTER — TELEPHONE (OUTPATIENT)
Dept: ONCOLOGY | Facility: CLINIC | Age: 21
End: 2021-02-26

## 2021-02-26 ENCOUNTER — INFUSION THERAPY VISIT (OUTPATIENT)
Dept: ONCOLOGY | Facility: CLINIC | Age: 21
End: 2021-02-26
Attending: PEDIATRICS
Payer: COMMERCIAL

## 2021-02-26 VITALS
BODY MASS INDEX: 28.39 KG/M2 | OXYGEN SATURATION: 99 % | HEART RATE: 109 BPM | TEMPERATURE: 97.8 F | WEIGHT: 165.4 LBS | RESPIRATION RATE: 16 BRPM | DIASTOLIC BLOOD PRESSURE: 83 MMHG | SYSTOLIC BLOOD PRESSURE: 131 MMHG

## 2021-02-26 DIAGNOSIS — D57.1 HB-SS DISEASE WITHOUT CRISIS (H): Primary | ICD-10-CM

## 2021-02-26 DIAGNOSIS — D57.00 SICKLE CELL PAIN CRISIS (H): ICD-10-CM

## 2021-02-26 LAB
ALBUMIN SERPL-MCNC: 3.5 G/DL (ref 3.4–5)
ALP SERPL-CCNC: 74 U/L (ref 40–150)
ALT SERPL W P-5'-P-CCNC: 87 U/L (ref 0–50)
ANION GAP SERPL CALCULATED.3IONS-SCNC: 5 MMOL/L (ref 3–14)
AST SERPL W P-5'-P-CCNC: 91 U/L (ref 0–45)
BASOPHILS # BLD AUTO: 0.2 10E9/L (ref 0–0.2)
BASOPHILS NFR BLD AUTO: 1.4 %
BILIRUB SERPL-MCNC: 1.3 MG/DL (ref 0.2–1.3)
BUN SERPL-MCNC: 8 MG/DL (ref 7–30)
CALCIUM SERPL-MCNC: 8.7 MG/DL (ref 8.5–10.1)
CHLORIDE SERPL-SCNC: 111 MMOL/L (ref 94–109)
CO2 SERPL-SCNC: 23 MMOL/L (ref 20–32)
CREAT SERPL-MCNC: 0.53 MG/DL (ref 0.52–1.04)
DIFFERENTIAL METHOD BLD: ABNORMAL
EOSINOPHIL # BLD AUTO: 0.5 10E9/L (ref 0–0.7)
EOSINOPHIL NFR BLD AUTO: 4.4 %
ERYTHROCYTE [DISTWIDTH] IN BLOOD BY AUTOMATED COUNT: 23.6 % (ref 10–15)
GFR SERPL CREATININE-BSD FRML MDRD: >90 ML/MIN/{1.73_M2}
GLUCOSE SERPL-MCNC: 88 MG/DL (ref 70–99)
HCT VFR BLD AUTO: 23.8 % (ref 35–47)
HGB BLD-MCNC: 7.9 G/DL (ref 11.7–15.7)
IMM GRANULOCYTES # BLD: 0.1 10E9/L (ref 0–0.4)
IMM GRANULOCYTES NFR BLD: 0.6 %
LYMPHOCYTES # BLD AUTO: 2 10E9/L (ref 0.8–5.3)
LYMPHOCYTES NFR BLD AUTO: 15.8 %
MCH RBC QN AUTO: 27.3 PG (ref 26.5–33)
MCHC RBC AUTO-ENTMCNC: 33.2 G/DL (ref 31.5–36.5)
MCV RBC AUTO: 82 FL (ref 78–100)
MONOCYTES # BLD AUTO: 0.7 10E9/L (ref 0–1.3)
MONOCYTES NFR BLD AUTO: 5.8 %
NEUTROPHILS # BLD AUTO: 8.9 10E9/L (ref 1.6–8.3)
NEUTROPHILS NFR BLD AUTO: 72 %
NRBC # BLD AUTO: 0.4 10*3/UL
NRBC BLD AUTO-RTO: 3 /100
PLATELET # BLD AUTO: 668 10E9/L (ref 150–450)
POTASSIUM SERPL-SCNC: 3.6 MMOL/L (ref 3.4–5.3)
PROT SERPL-MCNC: 8.7 G/DL (ref 6.8–8.8)
RBC # BLD AUTO: 2.89 10E12/L (ref 3.8–5.2)
RETICS # AUTO: 328.4 10E9/L (ref 25–95)
RETICS/RBC NFR AUTO: 11 % (ref 0.5–2)
SODIUM SERPL-SCNC: 139 MMOL/L (ref 133–144)
WBC # BLD AUTO: 12.3 10E9/L (ref 4–11)

## 2021-02-26 PROCEDURE — 96374 THER/PROPH/DIAG INJ IV PUSH: CPT

## 2021-02-26 PROCEDURE — 96361 HYDRATE IV INFUSION ADD-ON: CPT

## 2021-02-26 PROCEDURE — 258N000003 HC RX IP 258 OP 636: Performed by: PHYSICIAN ASSISTANT

## 2021-02-26 PROCEDURE — 85045 AUTOMATED RETICULOCYTE COUNT: CPT | Performed by: PHYSICIAN ASSISTANT

## 2021-02-26 PROCEDURE — 96376 TX/PRO/DX INJ SAME DRUG ADON: CPT

## 2021-02-26 PROCEDURE — 250N000011 HC RX IP 250 OP 636: Performed by: PHYSICIAN ASSISTANT

## 2021-02-26 PROCEDURE — 80053 COMPREHEN METABOLIC PANEL: CPT | Performed by: PHYSICIAN ASSISTANT

## 2021-02-26 PROCEDURE — 85025 COMPLETE CBC W/AUTO DIFF WBC: CPT | Performed by: PHYSICIAN ASSISTANT

## 2021-02-26 PROCEDURE — 250N000011 HC RX IP 250 OP 636: Performed by: PEDIATRICS

## 2021-02-26 RX ORDER — MORPHINE SULFATE 2 MG/ML
2 INJECTION, SOLUTION INTRAMUSCULAR; INTRAVENOUS
Status: CANCELLED
Start: 2021-02-26

## 2021-02-26 RX ORDER — HEPARIN SODIUM,PORCINE 10 UNIT/ML
5 VIAL (ML) INTRAVENOUS
Status: CANCELLED | OUTPATIENT
Start: 2021-02-26

## 2021-02-26 RX ORDER — DIPHENHYDRAMINE HCL 25 MG
25 CAPSULE ORAL
Status: CANCELLED
Start: 2021-02-26

## 2021-02-26 RX ORDER — HEPARIN SODIUM (PORCINE) LOCK FLUSH IV SOLN 100 UNIT/ML 100 UNIT/ML
5 SOLUTION INTRAVENOUS
Status: DISCONTINUED | OUTPATIENT
Start: 2021-02-26 | End: 2021-02-26 | Stop reason: HOSPADM

## 2021-02-26 RX ORDER — MORPHINE SULFATE 2 MG/ML
2 INJECTION, SOLUTION INTRAMUSCULAR; INTRAVENOUS
Status: COMPLETED | OUTPATIENT
Start: 2021-02-26 | End: 2021-02-26

## 2021-02-26 RX ORDER — HEPARIN SODIUM (PORCINE) LOCK FLUSH IV SOLN 100 UNIT/ML 100 UNIT/ML
5 SOLUTION INTRAVENOUS ONCE
Status: COMPLETED | OUTPATIENT
Start: 2021-02-26 | End: 2021-02-26

## 2021-02-26 RX ORDER — ONDANSETRON 8 MG/1
8 TABLET, FILM COATED ORAL
Status: CANCELLED
Start: 2021-02-26

## 2021-02-26 RX ORDER — HEPARIN SODIUM (PORCINE) LOCK FLUSH IV SOLN 100 UNIT/ML 100 UNIT/ML
5 SOLUTION INTRAVENOUS
Status: CANCELLED | OUTPATIENT
Start: 2021-02-26

## 2021-02-26 RX ADMIN — MORPHINE SULFATE 2 MG: 2 INJECTION, SOLUTION INTRAMUSCULAR; INTRAVENOUS at 15:11

## 2021-02-26 RX ADMIN — MORPHINE SULFATE 2 MG: 2 INJECTION, SOLUTION INTRAMUSCULAR; INTRAVENOUS at 14:09

## 2021-02-26 RX ADMIN — Medication 5 ML: at 12:24

## 2021-02-26 RX ADMIN — MORPHINE SULFATE 2 MG: 2 INJECTION, SOLUTION INTRAMUSCULAR; INTRAVENOUS at 12:58

## 2021-02-26 RX ADMIN — DEXTROSE AND SODIUM CHLORIDE 500 ML: 5; 450 INJECTION, SOLUTION INTRAVENOUS at 12:57

## 2021-02-26 RX ADMIN — Medication 5 ML: at 15:31

## 2021-02-26 ASSESSMENT — PAIN SCALES - GENERAL: PAINLEVEL: EXTREME PAIN (9)

## 2021-02-26 NOTE — PATIENT INSTRUCTIONS
[facility] Triage and after hours / weekends / holidays:  [phone]    Please call the triage or after hours line if you experience a temperature greater than or equal to 100.5, shaking chills, have uncontrolled nausea, vomiting and/or diarrhea, dizziness, shortness of breath, chest pain, bleeding, unexplained bruising, or if you have any other new/concerning symptoms, questions or concerns.      If you are having any concerning symptoms or wish to speak to a provider before your next infusion visit, please call your care coordinator or triage to notify them so we can adequately serve you.     If you need a refill on a narcotic prescription or other medication, please call before your infusion appointment.                 February 2021 Sunday Monday Tuesday Wednesday Thursday Friday Saturday        1    NM LUNG SCAN VENT/PERF   1:00 AM   (90 min.)   [id number]   [facility]    Admission   9:36 PM   Pedro Tinajero DO   [facility] [address] [place]   (Discharge: 2/3/2021) 2    XR CHEST PORT 1 VIEW   8:10 AM   (20 min.)   [id number]   [facility]    ECHO COMPLETE   8:20 AM   (60 min.)   [id number]   [facility] 3     4     5    VIDEO VISIT RETURN   2:15 PM   (30 min.)   Eric Duncan MD   [facility] 6       7     8     9    [facility] ONC INFUSION 120  12:30 PM   (120 min.)   [facility] ONCOL[facility]Y INFUSION   [facility] 10    LAB CENTRAL  10:45 AM   (15 min.)   [facility] DRAW   [facility] RETURN  10:55 AM   (50 min.)   Andrei Machado PA   [facility] BMT INFUSION 120   1:30 PM   (120 min.)   [facility] BMT INFUSION   [facility] [place] 11    MR ABDOMEN WO   7:30 AM   (60 min.)   UUMR1   [facility]    MR MYOCARDIUM WO   9:00 AM    (105 min.)   UUMR4   [facility] Imaging    VIDEO VISIT NEW   2:45 PM   (30 min.)   Suraj Case MD   [facility] [place]    Admission   8:29 PM   Artie Taylor MD   [facility] Emergency Department   (Discharge: 2/12/2021) 12     13       14     15     16    [facility] ONC INFUSION 120  11:00 AM   (120 min.)   [facility] ONCOLOGY INFUSION   [facility] 17    LAB CENTRAL  11:45 AM   (15 min.)   [facility] DRAW   [facility] ONC INFUSION 120  12:30 PM   (120 min.)   [facility] ONCOLOGY INFUSION   [facility]    VIDEO VISIT NEW   3:20 PM   (80 min.)   Rosalva Gasca MD   [facility] 18    Admission   8:54 AM   Cornelia Malloy MD   [facility] Emergency Department   (Discharge: 2/18/2021)    ECHO COMPLETE   9:20 AM   (60 min.)   [id number]   [facility] Heart Care    XR CHEST 2 VIEWS   9:25 AM   (15 min.)   UUXR1   [facility] 19    LAB PERIPHERAL  11:00 AM   (15 min.)   [facility] DRAW   [facility] ONC INFUSION 120  11:30 AM   (120 min.)   [facility] ONCOLOGY INFUSION   [facility] 20       21    Admission   8:54 PM   Reta Miramontes MD   [facility] Emergency Department   (Discharge: 2/22/2021)    XR CHEST 2 VIEWS  10:15 PM   (15 min.)   UUXR1   [facility] 22     23    [facility] ONC INFUSION 120  12:30 PM   (120 min.)   [facility] ONCOL[facility]Y INFUSION   [facility] 24    [facility] RETURN  10:05 AM   (50 min.)   Andrei Machado PA   [facility] ONC INFUSION 120  11:00 AM   (120 min.)   [facility] ONCOLOGY INFUSION   [facility] 25     26    LAB CENTRAL  12:00 PM   (15 min.)   UC  [facility] LAB DRAW   [facility] ONC INFUSION 120  12:30 PM   (120 min.)    ONCOLOGY INFUSION   [facility] 27 28 March 2021 Sunday Monday Tuesday Wednesday Thursday Friday Saturday        1     2     3     4  Happy Birthday!     5     6       7     8     9     10     11     12     13       14     15     16     17     18     19     20       21     22     23     24     25     26    LAB CENTRAL  12:30 PM   (15 min.)   [facility]   [facility]    [facility] RETURN  12:45 PM   (30 min.)   Eric Duncan MD   [facility] 27 28 29     30     31                                   Recent Results (from the past 24 hour(s))   Reticulocyte count    Collection Time: 02/26/21 12:31 PM   Result Value Ref Range    % Retic 11.0 (H) 0.5 - 2.0 %    Absolute Retic 328.4 (H) 25 - 95 10e9/L   Comprehensive metabolic panel    Collection Time: 02/26/21 12:31 PM   Result Value Ref Range    Sodium 139 133 - 144 mmol/L    Potassium 3.6 3.4 - 5.3 mmol/L    Chloride 111 (H) 94 - 109 mmol/L    Carbon Dioxide 23 20 - 32 mmol/L    Anion Gap 5 3 - 14 mmol/L    Glucose 88 70 - 99 mg/dL    Urea Nitrogen 8 7 - 30 mg/dL    Creatinine 0.53 0.52 - 1.04 mg/dL    GFR Estimate >90 >60 mL/min/[1.73_m2]    GFR Estimate If Black >90 >60 mL/min/[1.73_m2]    Calcium 8.7 8.5 - 10.1 mg/dL    Bilirubin Total 1.3 0.2 - 1.3 mg/dL    Albumin 3.5 3.4 - 5.0 g/dL    Protein Total 8.7 6.8 - 8.8 g/dL    Alkaline Phosphatase 74 40 - 150 U/L    ALT 87 (H) 0 - 50 U/L    AST 91 (H) 0 - 45 U/L   *CBC with platelets differential    Collection Time: 02/26/21 12:31 PM   Result Value Ref Range    WBC 12.3 (H) 4.0 - 11.0 10e9/L    RBC Count 2.89 (L) 3.8 - 5.2 10e12/L    Hemoglobin 7.9 (L) 11.7 - 15.7 g/dL    Hematocrit 23.8 (L) 35.0 - 47.0 %    MCV 82 78 - 100 fl    MCH 27.3 26.5 - 33.0 pg     MCHC 33.2 31.5 - 36.5 g/dL    RDW 23.6 (H) 10.0 - 15.0 %    Platelet Count 668 (H) 150 - 450 10e9/L    Diff Method Automated Method     % Neutrophils 72.0 %    % Lymphocytes 15.8 %    % Monocytes 5.8 %    % Eosinophils 4.4 %    % Basophils 1.4 %    % Immature Granulocytes 0.6 %    Nucleated RBCs 3 (H) 0 /100    Absolute Neutrophil 8.9 (H) 1.6 - 8.3 10e9/L    Absolute Lymphocytes 2.0 0.8 - 5.3 10e9/L    Absolute Monocytes 0.7 0.0 - 1.3 10e9/L    Absolute Eosinophils 0.5 0.0 - 0.7 10e9/L    Absolute Basophils 0.2 0.0 - 0.2 10e9/L    Abs Immature Granulocytes 0.1 0 - 0.4 10e9/L    Absolute Nucleated RBC 0.4

## 2021-02-26 NOTE — PROGRESS NOTES
Infusion Nursing Note:  Jennifer Cervantes presents today for IVF/pain medications .    Patient seen by provider today: No   present during visit today: Not Applicable.    Note: pt assessed upon arrival to infusion clinic.  Pt c/o 9/10 lower back pain.  Denies fever, chills, SOB, or cough.  No other signs/symptoms of infection noted.      Morphine x 3; pain 5/10.  IV fluids completed.      Patient was comfortable to leave; pt verified she understood her pain management plan over the weekend.      Intravenous Access:  Implanted Port.    Treatment Conditions:  Lab Results   Component Value Date    HGB 7.9 02/26/2021     Lab Results   Component Value Date    WBC 12.3 02/26/2021      Lab Results   Component Value Date    ANEU 8.9 02/26/2021     Lab Results   Component Value Date     02/26/2021      Lab Results   Component Value Date     02/26/2021                   Lab Results   Component Value Date    POTASSIUM 3.6 02/26/2021           Lab Results   Component Value Date    MAG 1.7 02/21/2021            Lab Results   Component Value Date    CR 0.53 02/26/2021                   Lab Results   Component Value Date    MICAH 8.7 02/26/2021                Lab Results   Component Value Date    BILITOTAL 1.3 02/26/2021           Lab Results   Component Value Date    ALBUMIN 3.5 02/26/2021                    Lab Results   Component Value Date    ALT 87 02/26/2021           Lab Results   Component Value Date    AST 91 02/26/2021           Discharge Plan:   Discharge instructions reviewed with: Patient.  Patient and/or family verbalized understanding of discharge instructions and all questions answered.  AVS to patient via [employer].  Patient will return 3/26/21 for next appointment.   Patient discharged in stable condition accompanied by: self.  Departure Mode: Ambulatory.    Claudia Batista RN

## 2021-02-26 NOTE — NURSING NOTE
Chief Complaint   Patient presents with     Port Draw     Labs drawn from port by RN in lab. Vitals taken. Checked into next appointment.      Port accessed with 20 gauge gripper needle by RN in lab.  Port flushed with saline and heparin.  Vital signs taken.  Pt checked in to next appointment.    Vida David RN

## 2021-02-26 NOTE — TELEPHONE ENCOUNTER
Pt called in to triage requesting IVF pain meds for back pain rated 8/10 x1 days. Stated last took prn oxy 15 mg, tylenol 1000 mg, scheduled celebrex this morning without relief. Denied any fevers, chills, cough, sob, chest pain or other symptoms. Pt's last infusion was 2/24, last clinic visit 2/24 with follow-up on 3/26 with . Pt denied being out of home medications and needing any refills today. Paged Andrei.

## 2021-02-26 NOTE — TELEPHONE ENCOUNTER
Called and relayed information to Pt, who verbalized understanding  That labs scheduled for 12:00pm today followed by IVF/Pain at 12:30pm.    Jennifer states she can get her own ride to clinic today.

## 2021-02-28 ENCOUNTER — APPOINTMENT (OUTPATIENT)
Dept: GENERAL RADIOLOGY | Facility: CLINIC | Age: 21
End: 2021-02-28
Attending: EMERGENCY MEDICINE
Payer: COMMERCIAL

## 2021-02-28 ENCOUNTER — HOSPITAL ENCOUNTER (EMERGENCY)
Facility: CLINIC | Age: 21
Discharge: HOME | End: 2021-02-28
Attending: EMERGENCY MEDICINE | Admitting: EMERGENCY MEDICINE
Payer: COMMERCIAL

## 2021-02-28 VITALS
TEMPERATURE: 98.3 F | BODY MASS INDEX: 28.39 KG/M2 | SYSTOLIC BLOOD PRESSURE: 106 MMHG | RESPIRATION RATE: 16 BRPM | OXYGEN SATURATION: 97 % | HEIGHT: 64 IN | DIASTOLIC BLOOD PRESSURE: 57 MMHG | HEART RATE: 104 BPM

## 2021-02-28 DIAGNOSIS — D57.00 SICKLE CELL PAIN CRISIS (H): ICD-10-CM

## 2021-02-28 LAB
ANION GAP SERPL CALCULATED.3IONS-SCNC: 5 MMOL/L (ref 3–14)
BASOPHILS # BLD AUTO: 0.2 10E9/L (ref 0–0.2)
BASOPHILS NFR BLD AUTO: 1.4 %
BUN SERPL-MCNC: 13 MG/DL (ref 7–30)
CALCIUM SERPL-MCNC: 8.6 MG/DL (ref 8.5–10.1)
CHLORIDE SERPL-SCNC: 109 MMOL/L (ref 94–109)
CO2 SERPL-SCNC: 25 MMOL/L (ref 20–32)
CREAT SERPL-MCNC: 0.56 MG/DL (ref 0.52–1.04)
DIFFERENTIAL METHOD BLD: ABNORMAL
EOSINOPHIL # BLD AUTO: 0.7 10E9/L (ref 0–0.7)
EOSINOPHIL NFR BLD AUTO: 5.1 %
ERYTHROCYTE [DISTWIDTH] IN BLOOD BY AUTOMATED COUNT: 25.1 % (ref 10–15)
GFR SERPL CREATININE-BSD FRML MDRD: >90 ML/MIN/{1.73_M2}
GLUCOSE SERPL-MCNC: 93 MG/DL (ref 70–99)
HCG SERPL QL: NEGATIVE
HCT VFR BLD AUTO: 24.1 % (ref 35–47)
HGB BLD-MCNC: 7.7 G/DL (ref 11.7–15.7)
IMM GRANULOCYTES # BLD: 0 10E9/L (ref 0–0.4)
IMM GRANULOCYTES NFR BLD: 0.3 %
INTERPRETATION ECG - MUSE: NORMAL
LYMPHOCYTES # BLD AUTO: 3.6 10E9/L (ref 0.8–5.3)
LYMPHOCYTES NFR BLD AUTO: 27.4 %
MCH RBC QN AUTO: 27.4 PG (ref 26.5–33)
MCHC RBC AUTO-ENTMCNC: 32 G/DL (ref 31.5–36.5)
MCV RBC AUTO: 86 FL (ref 78–100)
MONOCYTES # BLD AUTO: 0.8 10E9/L (ref 0–1.3)
MONOCYTES NFR BLD AUTO: 6.4 %
NEUTROPHILS # BLD AUTO: 7.8 10E9/L (ref 1.6–8.3)
NEUTROPHILS NFR BLD AUTO: 59.4 %
NRBC # BLD AUTO: 0.3 10*3/UL
NRBC BLD AUTO-RTO: 2 /100
PLATELET # BLD AUTO: 676 10E9/L (ref 150–450)
POTASSIUM SERPL-SCNC: 3.7 MMOL/L (ref 3.4–5.3)
RBC # BLD AUTO: 2.81 10E12/L (ref 3.8–5.2)
RETICS # AUTO: 366.7 10E9/L (ref 25–95)
RETICS/RBC NFR AUTO: 13.1 % (ref 0.5–2)
SODIUM SERPL-SCNC: 140 MMOL/L (ref 133–144)
TROPONIN I SERPL-MCNC: <0.015 UG/L (ref 0–0.04)
WBC # BLD AUTO: 13.2 10E9/L (ref 4–11)

## 2021-02-28 PROCEDURE — 85025 COMPLETE CBC W/AUTO DIFF WBC: CPT | Performed by: EMERGENCY MEDICINE

## 2021-02-28 PROCEDURE — 85045 AUTOMATED RETICULOCYTE COUNT: CPT | Performed by: EMERGENCY MEDICINE

## 2021-02-28 PROCEDURE — 99285 EMERGENCY DEPT VISIT HI MDM: CPT | Mod: 25 | Performed by: EMERGENCY MEDICINE

## 2021-02-28 PROCEDURE — 93005 ELECTROCARDIOGRAM TRACING: CPT | Performed by: EMERGENCY MEDICINE

## 2021-02-28 PROCEDURE — 80048 BASIC METABOLIC PNL TOTAL CA: CPT | Performed by: EMERGENCY MEDICINE

## 2021-02-28 PROCEDURE — 96374 THER/PROPH/DIAG INJ IV PUSH: CPT | Performed by: EMERGENCY MEDICINE

## 2021-02-28 PROCEDURE — 71046 X-RAY EXAM CHEST 2 VIEWS: CPT

## 2021-02-28 PROCEDURE — 96361 HYDRATE IV INFUSION ADD-ON: CPT | Performed by: EMERGENCY MEDICINE

## 2021-02-28 PROCEDURE — 258N000003 HC RX IP 258 OP 636: Performed by: EMERGENCY MEDICINE

## 2021-02-28 PROCEDURE — 96376 TX/PRO/DX INJ SAME DRUG ADON: CPT | Performed by: EMERGENCY MEDICINE

## 2021-02-28 PROCEDURE — 84484 ASSAY OF TROPONIN QUANT: CPT | Performed by: EMERGENCY MEDICINE

## 2021-02-28 PROCEDURE — 250N000011 HC RX IP 250 OP 636: Performed by: EMERGENCY MEDICINE

## 2021-02-28 PROCEDURE — 71046 X-RAY EXAM CHEST 2 VIEWS: CPT | Mod: 26 | Performed by: RADIOLOGY

## 2021-02-28 PROCEDURE — 84703 CHORIONIC GONADOTROPIN ASSAY: CPT | Performed by: EMERGENCY MEDICINE

## 2021-02-28 PROCEDURE — 93010 ELECTROCARDIOGRAM REPORT: CPT | Performed by: EMERGENCY MEDICINE

## 2021-02-28 RX ORDER — HEPARIN SODIUM,PORCINE 10 UNIT/ML
5 VIAL (ML) INTRAVENOUS
Status: CANCELLED | OUTPATIENT
Start: 2021-03-02

## 2021-02-28 RX ORDER — HEPARIN SODIUM (PORCINE) LOCK FLUSH IV SOLN 100 UNIT/ML 100 UNIT/ML
5 SOLUTION INTRAVENOUS
Status: CANCELLED | OUTPATIENT
Start: 2021-03-02

## 2021-02-28 RX ORDER — MORPHINE SULFATE 2 MG/ML
2 INJECTION, SOLUTION INTRAMUSCULAR; INTRAVENOUS ONCE
Status: COMPLETED | OUTPATIENT
Start: 2021-02-28 | End: 2021-02-28

## 2021-02-28 RX ORDER — HEPARIN SODIUM (PORCINE) LOCK FLUSH IV SOLN 100 UNIT/ML 100 UNIT/ML
5 SOLUTION INTRAVENOUS ONCE
Status: COMPLETED | OUTPATIENT
Start: 2021-02-28 | End: 2021-02-28

## 2021-02-28 RX ADMIN — SODIUM CHLORIDE, POTASSIUM CHLORIDE, SODIUM LACTATE AND CALCIUM CHLORIDE 1000 ML: 600; 310; 30; 20 INJECTION, SOLUTION INTRAVENOUS at 01:37

## 2021-02-28 RX ADMIN — MORPHINE SULFATE 2 MG: 2 INJECTION, SOLUTION INTRAMUSCULAR; INTRAVENOUS at 01:37

## 2021-02-28 RX ADMIN — Medication 5 ML: at 05:41

## 2021-02-28 RX ADMIN — MORPHINE SULFATE 2 MG: 2 INJECTION, SOLUTION INTRAMUSCULAR; INTRAVENOUS at 05:14

## 2021-02-28 RX ADMIN — MORPHINE SULFATE 2 MG: 2 INJECTION, SOLUTION INTRAMUSCULAR; INTRAVENOUS at 03:30

## 2021-02-28 NOTE — DISCHARGE INSTRUCTIONS
Continue your outpatient meds. Follow up with your hematologist next week. Return to the ER with any concerns.   
POCUS

## 2021-02-28 NOTE — ED TRIAGE NOTES
Hx sickle cell, lower back pain started approximately 12 hours ago, not relieved with home regimen. Pain under R breast started 3 hours ago, tried nebs/inhaler/resting with no relief.

## 2021-02-28 NOTE — ED PROVIDER NOTES
"ED Provider Note  [facility]      History     Chief Complaint   Patient presents with     Sickle Cell Pain Crisis     The history is provided by the patient and medical records.     Jennifer Cervantes is a 20 year old female with a past medical history significant for sickle cell anemia (care plan in place), PE, asthma, and hemiplegia who presents here to the Emergency Department due to a sickle cell pain crisis.  Patient endorses back and right-sided chest pain.  She reports that her back pain began about a week and a half ago.  Patient reports that nothing helps her back pain.  She reports that her chest pain began on Saturday (2/27/2021) at around noon.  She states that this chest pain worsens upon movement.  This chest pain is not pleuritic.  She states that there is no clear cause of her pain.  She states that she began drinking more water but it did not help her symptoms.  She also endorses some right side pain that began about 3 hours ago.  She states that this side pain worsens when she stays in one spot.  Patient reports that she believed this side pain to be due to her asthma, so she took a nebulizer but it did not ease the pain.  Patient states that she rates the pain as an 8.5 out of 10.  She states that all of her pain is constant.  Patient denies any abdominal pain, shortness of breath, fever, fatigue, or weakness.  Patient also denies a cough.  Patient states she is compliant with rivaroxaban and has not missed any of her doses.    Per chart review, patient was seen here on 2/21/2021 for sickle cell pain crisis.  She had been having ongoing sickle cell pain for about 3 days.  She described her pain as usual sickle cell pain as a \"diffuse all over pain\".  Her sickle cell care plan was followed and she was later discharged.       Past Medical History  Past Medical History:   Diagnosis Date     Anemia      Anxiety      Bleeding disorder (H)      Blood clotting disorder (H)      " Cerebral infarction (H) 2015     Cognitive developmental delay     low IQ. 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 (H)     right hand contractures     Iron overload due to repeated red blood cell transfusions      Migraines      Multiple subsegmental pulmonary emboli without acute cor pulmonale (H) 02/01/2021     Oppositional defiant behavior      Uncomplicated asthma      Past Surgical History:   Procedure Laterality Date     AS INSERT TUNNELED CV 2 CATH W/O PORT/PUMP       C BREAST REDUCTION (INCLUDES LIPO) TIER 3 Bilateral 04/23/2019     CHOLECYSTECTOMY       IR CVC NON TUNNEL PLACEMENT  04/07/2020     REPAIR TENDON ELBOW Right 10/02/2019    Procedure: Right Elbow Flexor Lengthening, Flexor Pronator Slide Of Wrist and Finger, Thumb Adductor Lengthening;  Surgeon: Anai Franco MD;  [facility]     TONSILLECTOMY Bilateral 10/02/2019    Procedure: Bilateral Tonsillectomy;  Surgeon: Farhana Guy MD;  Location[facility] OR     acetaminophen (TYLENOL) 325 MG tablet  albuterol (PROAIR HFA/PROVENTIL HFA/VENTOLIN HFA) 108 (90 Base) MCG/ACT inhaler  albuterol (PROVENTIL) (2.5 MG/3ML) 0.083% neb solution  budesonide-formoterol (SYMBICORT) 160-4.5 MCG/ACT Inhaler  celecoxib (CELEBREX) 100 MG capsule  Hydroxyurea 1000 MG TABS  JADENU 360 MG tablet  oxyCODONE IR (ROXICODONE) 15 MG tablet  rivaroxaban ANTICOAGULANT (XARELTO) 20 MG TABS tablet  aspirin (ASPIRIN) 81 MG EC tablet  diphenhydrAMINE (BENADRYL) 25 MG capsule  EPINEPHrine (ANY BX GENERIC EQUIV) 0.3 MG/0.3ML injection 2-pack  medroxyPROGESTERone (DEPO-PROVERA) 150 MG/ML IM injection  naloxone (NARCAN) 4 MG/0.1ML nasal spray  ondansetron (ZOFRAN) 8 MG tablet  sertraline (ZOLOFT) 100 MG tablet      Allergies   Allergen Reactions     Fish-Derived Products Hives     Seafood Hives     Contrast Dye      Diagnostic X-Ray  "Materials      Gadolinium      Family History  Family History   Problem Relation Age of Onset     Sickle Cell Trait Mother      Hypertension Mother      Asthma Mother      Sickle Cell Trait Father      Social History   Social History     Tobacco Use     Smoking status: Never Smoker     Smokeless tobacco: Never Used   Substance Use Topics     Alcohol use: Not Currently     Alcohol/week: 0.0 standard drinks     Drug use: Never      Past medical history, past surgical history, medications, allergies, family history, and social history were reviewed with the patient. No additional pertinent items.       Review of Systems  A complete review of systems was performed with pertinent positives and negatives noted in the HPI, and all other systems negative.    Physical Exam   BP: 133/78  Pulse: 97  Temp: 98.3  F (36.8  C)  Resp: 16  Height: 162.6 cm (5' 4\")  SpO2: 97 %  Physical Exam  Constitutional:       General: She is not in acute distress.     Appearance: She is not diaphoretic.   HENT:      Head: Atraumatic.   Eyes:      General: No scleral icterus.  Cardiovascular:      Heart sounds: Normal heart sounds.   Pulmonary:      Effort: No respiratory distress.      Breath sounds: Normal breath sounds.   Abdominal:      Palpations: Abdomen is soft.      Tenderness: There is no abdominal tenderness.   Musculoskeletal:         General: No tenderness.      Comments: Chronic right upper extremity weakness as well as right sided foot drop   Skin:     General: Skin is warm.      Findings: No rash.       ED Course      Procedures             EKG Interpretation:      Interpreted by Court Ring MD  Time reviewed: 0145  Symptoms at time of EKG: right sided chest pain   Rhythm: normal sinus   Rate: 89  Axis: Normal  Ectopy: none  Conduction: normal  ST Segments/ T Waves: nonspecific T wave abnormality  Q Waves: none  Comparison to prior: V2 now with non-specific TWI    Clinical Impression: non-specific EKG                       "      Results for orders placed or performed during the hospital encounter of 02/28/21   Chest XR,  PA & LAT     Status: None    Narrative    EXAM: XR CHEST 2 VW  LOCATION: [facility]  DATE/TIME: 2/28/2021 1:41 AM    INDICATION: Right-sided chest pain.  COMPARISON: 02/21/2021      Impression    IMPRESSION: Left chest port catheter tip near cavoatrial junction. Heart size is normal. Lungs are clear. No visible pneumothorax or pleural effusion. Cholecystectomy clips.   HCG qualitative pregnancy (blood)     Status: None   Result Value Ref Range    HCG Qualitative Serum Negative NEG^Negative   CBC with platelets differential     Status: Abnormal   Result Value Ref Range    WBC 13.2 (H) 4.0 - 11.0 10e9/L    RBC Count 2.81 (L) 3.8 - 5.2 10e12/L    Hemoglobin 7.7 (L) 11.7 - 15.7 g/dL    Hematocrit 24.1 (L) 35.0 - 47.0 %    MCV 86 78 - 100 fl    MCH 27.4 26.5 - 33.0 pg    MCHC 32.0 31.5 - 36.5 g/dL    RDW 25.1 (H) 10.0 - 15.0 %    Platelet Count 676 (H) 150 - 450 10e9/L    Diff Method Manual Method     % Neutrophils 59.4 %    % Lymphocytes 27.4 %    % Monocytes 6.4 %    % Eosinophils 5.1 %    % Basophils 1.4 %    % Immature Granulocytes 0.3 %    Nucleated RBCs 2 (H) 0 /100    Absolute Neutrophil 7.8 1.6 - 8.3 10e9/L    Absolute Lymphocytes 3.6 0.8 - 5.3 10e9/L    Absolute Monocytes 0.8 0.0 - 1.3 10e9/L    Absolute Eosinophils 0.7 0.0 - 0.7 10e9/L    Absolute Basophils 0.2 0.0 - 0.2 10e9/L    Abs Immature Granulocytes 0.0 0 - 0.4 10e9/L    Absolute Nucleated RBC 0.3    Basic metabolic panel     Status: None   Result Value Ref Range    Sodium 140 133 - 144 mmol/L    Potassium 3.7 3.4 - 5.3 mmol/L    Chloride 109 94 - 109 mmol/L    Carbon Dioxide 25 20 - 32 mmol/L    Anion Gap 5 3 - 14 mmol/L    Glucose 93 70 - 99 mg/dL    Urea Nitrogen 13 7 - 30 mg/dL    Creatinine 0.56 0.52 - 1.04 mg/dL    GFR Estimate >90 >60 mL/min/[1.73_m2]    GFR Estimate If Black >90 >60 mL/min/[1.73_m2]    Calcium 8.6 8.5 - 10.1 mg/dL    Reticulocyte count     Status: Abnormal   Result Value Ref Range    % Retic 13.1 (H) 0.5 - 2.0 %    Absolute Retic 366.7 (H) 25 - 95 10e9/L   Troponin I     Status: None   Result Value Ref Range    Troponin I ES <0.015 0.000 - 0.045 ug/L   EKG 12 lead     Status: None (Preliminary result)   Result Value Ref Range    Interpretation ECG Click View Image link to view waveform and result      Medications   morphine (PF) injection 2 mg (2 mg Intravenous Given 2/28/21 0137)   lactated ringers BOLUS 1,000 mL (0 mLs Intravenous Stopped 2/28/21 0321)   morphine (PF) injection 2 mg (2 mg Intravenous Given 2/28/21 0330)   morphine (PF) injection 2 mg (2 mg Intravenous Given 2/28/21 0514)   heparin 100 UNIT/ML injection 5 mL (5 mLs Intracatheter Given 2/28/21 0541)        Assessments & Plan (with Medical Decision Making)   The patient has ongoing low back pain which she feels is related to her sickle cell as well as right chest pain which she additionally feels is related to her sickle cell.  I did consider PE, though the patient has no shortness of breath, the chest pain is not pleuritic, and the patient states she has been compliant with her rivaroxaban.  This makes PE very unlikely.  Chest x-ray was normal.  Nothing to suggest acute chest syndrome.  He had an EKG which had a lot of background interference, was somewhat difficult to read, but did look like there is new T wave inversion in V2 when compared to previous.  However, morphology of V2 looks very similar to the one, and I suspect it is likely at least partially related to lead placement.  Regardless, the patient has had chest pain for many hours at the time of presentation, and her troponin is negative.  I have low suspicion for acute coronary syndrome.  Again, the chest pain is right-sided.  She did receive morphine and IV fluid per her pain management protocol, reports significant improvement.  She feels she is able to discharge home at this time.  Basic labs are  fairly stable and compared with previous.  Nothing to suggest acute infectious etiology.  I do think she safe for discharge home.  She is encouraged to follow-up with her hematologist next week, return to the ER for new or worsening symptoms.  She verbalizes understanding and is agreeable to the plan.    Dictation Disclaimer: Some of this Note has been completed with voice-recognition dictation software. Although errors are generally corrected real-time, there is the potential for a rare error to be present in the completed chart.      I have reviewed the nursing notes. I have reviewed the findings, diagnosis, plan and need for follow up with the patient.    Discharge Medication List as of 2/28/2021  5:31 AM          Final diagnoses:   Sickle cell pain crisis (H)   IConnie, am serving as a trained medical scribe to document services personally performed by Court Ring MD, based on the provider's statements to me.  ICourt MD, was physically present and have reviewed and verified the accuracy of this note documented by Connie Malik.      --  [facility] EMERGENCY DEPARTMENT  2/28/2021     Court Ring MD  02/28/21 0594

## 2021-03-01 ENCOUNTER — HOSPITAL ENCOUNTER (EMERGENCY)
Facility: CLINIC | Age: 21
Discharge: HOME | End: 2021-03-02
Attending: EMERGENCY MEDICINE | Admitting: EMERGENCY MEDICINE
Payer: COMMERCIAL

## 2021-03-01 ENCOUNTER — PATIENT OUTREACH (OUTPATIENT)
Dept: ONCOLOGY | Facility: CLINIC | Age: 21
End: 2021-03-01

## 2021-03-01 DIAGNOSIS — D57.00 SICKLE CELL PAIN CRISIS (H): ICD-10-CM

## 2021-03-01 LAB
ALBUMIN SERPL-MCNC: 3.8 G/DL (ref 3.4–5)
ALP SERPL-CCNC: 78 U/L (ref 40–150)
ALT SERPL W P-5'-P-CCNC: 135 U/L (ref 0–50)
ANION GAP SERPL CALCULATED.3IONS-SCNC: 5 MMOL/L (ref 3–14)
ANISOCYTOSIS BLD QL SMEAR: ABNORMAL
AST SERPL W P-5'-P-CCNC: 120 U/L (ref 0–45)
BASOPHILS # BLD AUTO: 0.3 10E9/L (ref 0–0.2)
BASOPHILS NFR BLD AUTO: 1.8 %
BILIRUB SERPL-MCNC: 1.8 MG/DL (ref 0.2–1.3)
BUN SERPL-MCNC: 12 MG/DL (ref 7–30)
CALCIUM SERPL-MCNC: 9 MG/DL (ref 8.5–10.1)
CHLORIDE SERPL-SCNC: 105 MMOL/L (ref 94–109)
CO2 SERPL-SCNC: 26 MMOL/L (ref 20–32)
CREAT SERPL-MCNC: 0.54 MG/DL (ref 0.52–1.04)
DIFFERENTIAL METHOD BLD: ABNORMAL
EOSINOPHIL # BLD AUTO: 0.3 10E9/L (ref 0–0.7)
EOSINOPHIL NFR BLD AUTO: 1.8 %
ERYTHROCYTE [DISTWIDTH] IN BLOOD BY AUTOMATED COUNT: 24.5 % (ref 10–15)
GFR SERPL CREATININE-BSD FRML MDRD: >90 ML/MIN/{1.73_M2}
GLUCOSE SERPL-MCNC: 98 MG/DL (ref 70–99)
HCT VFR BLD AUTO: 23.3 % (ref 35–47)
HGB BLD-MCNC: 7.6 G/DL (ref 11.7–15.7)
LYMPHOCYTES # BLD AUTO: 3.8 10E9/L (ref 0.8–5.3)
LYMPHOCYTES NFR BLD AUTO: 26.4 %
MCH RBC QN AUTO: 27.1 PG (ref 26.5–33)
MCHC RBC AUTO-ENTMCNC: 32.6 G/DL (ref 31.5–36.5)
MCV RBC AUTO: 83 FL (ref 78–100)
MONOCYTES # BLD AUTO: 1.2 10E9/L (ref 0–1.3)
MONOCYTES NFR BLD AUTO: 8.2 %
NEUTROPHILS # BLD AUTO: 8.9 10E9/L (ref 1.6–8.3)
NEUTROPHILS NFR BLD AUTO: 61.8 %
NRBC # BLD AUTO: 0.3 10*3/UL
NRBC BLD AUTO-RTO: 2 /100
PLATELET # BLD AUTO: 654 10E9/L (ref 150–450)
PLATELET # BLD EST: ABNORMAL 10*3/UL
POIKILOCYTOSIS BLD QL SMEAR: ABNORMAL
POLYCHROMASIA BLD QL SMEAR: SLIGHT
POTASSIUM SERPL-SCNC: 3.7 MMOL/L (ref 3.4–5.3)
PROT SERPL-MCNC: 9.1 G/DL (ref 6.8–8.8)
RBC # BLD AUTO: 2.8 10E12/L (ref 3.8–5.2)
RETICS # AUTO: 246.4 10E9/L (ref 25–95)
RETICS/RBC NFR AUTO: 8.8 % (ref 0.5–2)
SICKLE CELLS BLD QL SMEAR: SLIGHT
SODIUM SERPL-SCNC: 136 MMOL/L (ref 133–144)
TARGETS BLD QL SMEAR: SLIGHT
WBC # BLD AUTO: 14.4 10E9/L (ref 4–11)

## 2021-03-01 PROCEDURE — 96374 THER/PROPH/DIAG INJ IV PUSH: CPT

## 2021-03-01 PROCEDURE — 85025 COMPLETE CBC W/AUTO DIFF WBC: CPT | Performed by: EMERGENCY MEDICINE

## 2021-03-01 PROCEDURE — 99285 EMERGENCY DEPT VISIT HI MDM: CPT | Mod: 25

## 2021-03-01 PROCEDURE — 96376 TX/PRO/DX INJ SAME DRUG ADON: CPT

## 2021-03-01 PROCEDURE — 80053 COMPREHEN METABOLIC PANEL: CPT | Performed by: EMERGENCY MEDICINE

## 2021-03-01 PROCEDURE — 250N000011 HC RX IP 250 OP 636: Performed by: EMERGENCY MEDICINE

## 2021-03-01 PROCEDURE — 85045 AUTOMATED RETICULOCYTE COUNT: CPT | Performed by: EMERGENCY MEDICINE

## 2021-03-01 PROCEDURE — 258N000003 HC RX IP 258 OP 636: Performed by: EMERGENCY MEDICINE

## 2021-03-01 PROCEDURE — 99285 EMERGENCY DEPT VISIT HI MDM: CPT | Performed by: EMERGENCY MEDICINE

## 2021-03-01 PROCEDURE — 96361 HYDRATE IV INFUSION ADD-ON: CPT

## 2021-03-01 RX ORDER — MORPHINE SULFATE 2 MG/ML
2 INJECTION, SOLUTION INTRAMUSCULAR; INTRAVENOUS
Status: COMPLETED | OUTPATIENT
Start: 2021-03-01 | End: 2021-03-02

## 2021-03-01 RX ORDER — SODIUM CHLORIDE, SODIUM LACTATE, POTASSIUM CHLORIDE, CALCIUM CHLORIDE 600; 310; 30; 20 MG/100ML; MG/100ML; MG/100ML; MG/100ML
INJECTION, SOLUTION INTRAVENOUS CONTINUOUS
Status: DISCONTINUED | OUTPATIENT
Start: 2021-03-01 | End: 2021-03-02 | Stop reason: HOSPADM

## 2021-03-01 RX ADMIN — SODIUM CHLORIDE, POTASSIUM CHLORIDE, SODIUM LACTATE AND CALCIUM CHLORIDE: 600; 310; 30; 20 INJECTION, SOLUTION INTRAVENOUS at 20:50

## 2021-03-01 RX ADMIN — MORPHINE SULFATE 2 MG: 2 INJECTION, SOLUTION INTRAMUSCULAR; INTRAVENOUS at 20:50

## 2021-03-01 RX ADMIN — MORPHINE SULFATE 2 MG: 2 INJECTION, SOLUTION INTRAMUSCULAR; INTRAVENOUS at 22:17

## 2021-03-01 ASSESSMENT — ENCOUNTER SYMPTOMS
BACK PAIN: 1
CONFUSION: 0
NECK PAIN: 0
FEVER: 0
ABDOMINAL PAIN: 0
SORE THROAT: 0
PALPITATIONS: 0
VOMITING: 0
EYE PAIN: 0
DIARRHEA: 0
DIZZINESS: 0
ABDOMINAL DISTENTION: 0
NAUSEA: 0
DYSURIA: 0
DIFFICULTY URINATING: 0
CONSTIPATION: 0
FREQUENCY: 0
HEADACHES: 0
COUGH: 0
ARTHRALGIAS: 0
SHORTNESS OF BREATH: 0
WEAKNESS: 0
CHILLS: 0
COLOR CHANGE: 0
CHEST TIGHTNESS: 0
MYALGIAS: 0
FATIGUE: 0

## 2021-03-01 ASSESSMENT — MIFFLIN-ST. JEOR: SCORE: 1500

## 2021-03-01 NOTE — PROGRESS NOTES
Writer placed call to Jennifer to discuss the upcoming potential plan for IV iron chelation. She will need MRI-abd first to assess level of iron overload, overdue for this. Writer also discussed that if her insurance approves coverage for IV iron chelation, her appointments will be 5 days a week for at least 2 weeks and lasting about 8 hours each. Jennifer voiced understanding of this and is ok with length of appointment, will need rides arranged. Pain has been overall manageable at home with occasional need for IVF/pain meds through outpatient infusion center or ER. Message sent to CC pool for next available MRI and will schedule IV iron chelation once coverage has been secured.

## 2021-03-02 ENCOUNTER — PATIENT OUTREACH (OUTPATIENT)
Dept: CARE COORDINATION | Facility: CLINIC | Age: 21
End: 2021-03-02

## 2021-03-02 VITALS
DIASTOLIC BLOOD PRESSURE: 85 MMHG | WEIGHT: 165.34 LBS | TEMPERATURE: 98 F | HEART RATE: 112 BPM | BODY MASS INDEX: 28.23 KG/M2 | RESPIRATION RATE: 16 BRPM | HEIGHT: 64 IN | SYSTOLIC BLOOD PRESSURE: 126 MMHG | OXYGEN SATURATION: 97 %

## 2021-03-02 PROCEDURE — 96376 TX/PRO/DX INJ SAME DRUG ADON: CPT | Performed by: EMERGENCY MEDICINE

## 2021-03-02 PROCEDURE — 250N000011 HC RX IP 250 OP 636: Performed by: EMERGENCY MEDICINE

## 2021-03-02 RX ORDER — HEPARIN SODIUM (PORCINE) LOCK FLUSH IV SOLN 100 UNIT/ML 100 UNIT/ML
5 SOLUTION INTRAVENOUS
Status: DISCONTINUED | OUTPATIENT
Start: 2021-03-02 | End: 2021-03-02 | Stop reason: HOSPADM

## 2021-03-02 RX ADMIN — Medication 5 ML: at 02:39

## 2021-03-02 RX ADMIN — MORPHINE SULFATE 2 MG: 2 INJECTION, SOLUTION INTRAMUSCULAR; INTRAVENOUS at 01:26

## 2021-03-02 NOTE — ED PROVIDER NOTES
[place] EMERGENCY DEPARTMENT ([facility])  3/01/21  History     Chief Complaint   Patient presents with     Sickle Cell Pain Crisis     The history is provided by the patient and medical records.     Jennifer Cervantes is a 20 year old female with PMH notable for sickle cell anemia (care plan in place), PE, asthma, and hemiplegia who presents here to the Emergency Department due to a sickle cell pain crisis.   Patient reports developing sharp, generalized body pain around 5 PM this evening.  She notes the pain is worse throughout her whole back.  She denies her back pain radiating to her neck or legs.  She states this feels similar to her previous sickle cell pain crises.  Patient notes her crises are sometimes prompted by the cold weather.  She was out in the cold running errands with her mother prior to the onset of her pain.  Patient denies chest pain or shortness of breath.  No fever, chills or recent illness.  No nausea or vomiting.  Patient has been compliant with her medications including the use of Xarelto.  The patient to take her pain medication prior to arrival in the ED with no relief.    Per review of patient's chart she was seen here in the ED yesterday for ongoing lower back pain and right chest pain in the setting of sickle cell pain crisis. She had no shortness of breath, the chest pain was not pleuritic, and the patient stated she has been compliant with her rivaroxaban.  Chest x-ray was normal.  Nothing to suggest acute chest syndrome. EKG  had a lot of background interference, was somewhat difficult to read, but did look like there was new T wave inversion in V2 when compared to previous.  However, morphology of V2 looked very similar to the one, suspected to be at least partially related to lead placement. Troponin was negative. Low suspicion for acute coronary syndrome. She did receive morphine and IV fluid per her pain management protocol, reported significant improvement. Basic labs  "are fairly stable and compared with previous.  Nothing to suggest acute infectious etiology. She felt that she could discharge home and follow-up with hematology.     Chest XR 2/28    IMPRESSION: Left chest port catheter tip near cavoatrial junction. Heart size is normal. Lungs are clear. No visible pneumothorax or pleural effusion. Cholecystectomy clips.     Patient was also seen here on 2/21/2021 for sickle cell pain crisis.  She had been having ongoing sickle cell pain for about 3 days.  She described her pain as usual sickle cell pain as a \"diffuse all over pain\".  Her sickle cell care plan was followed and she was later discharged.    Past Medical History  Past Medical History:   Diagnosis Date     Anemia      Anxiety      Bleeding disorder (H)      Blood clotting disorder (H)      Cerebral infarction (H) 2015     Cognitive developmental delay     low IQ. 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 (H)     right hand contractures     Iron overload due to repeated red blood cell transfusions      Migraines      Multiple subsegmental pulmonary emboli without acute cor pulmonale (H) 02/01/2021     Oppositional defiant behavior      Uncomplicated asthma      Past Surgical History:   Procedure Laterality Date     AS INSERT TUNNELED CV 2 CATH W/O PORT/PUMP       C BREAST REDUCTION (INCLUDES LIPO) TIER 3 Bilateral 04/23/2019     CHOLECYSTECTOMY       IR CVC NON TUNNEL PLACEMENT  04/07/2020     REPAIR TENDON ELBOW Right 10/02/2019    Procedure: Right Elbow Flexor Lengthening, Flexor Pronator Slide Of Wrist and Finger, Thumb Adductor Lengthening;  Surgeon: Anai Franco MD;  [facility]     TONSILLECTOMY Bilateral 10/02/2019    Procedure: Bilateral Tonsillectomy;  Surgeon: Farhana Guy MD;  [facility] OR     acetaminophen (TYLENOL) 325 MG tablet  albuterol " (PROAIR HFA/PROVENTIL HFA/VENTOLIN HFA) 108 (90 Base) MCG/ACT inhaler  albuterol (PROVENTIL) (2.5 MG/3ML) 0.083% neb solution  aspirin (ASPIRIN) 81 MG EC tablet  budesonide-formoterol (SYMBICORT) 160-4.5 MCG/ACT Inhaler  celecoxib (CELEBREX) 100 MG capsule  diphenhydrAMINE (BENADRYL) 25 MG capsule  EPINEPHrine (ANY BX GENERIC EQUIV) 0.3 MG/0.3ML injection 2-pack  Hydroxyurea 1000 MG TABS  JADENU 360 MG tablet  medroxyPROGESTERone (DEPO-PROVERA) 150 MG/ML IM injection  naloxone (NARCAN) 4 MG/0.1ML nasal spray  ondansetron (ZOFRAN) 8 MG tablet  oxyCODONE IR (ROXICODONE) 15 MG tablet  rivaroxaban ANTICOAGULANT (XARELTO) 20 MG TABS tablet  sertraline (ZOLOFT) 100 MG tablet      Allergies   Allergen Reactions     Fish-Derived Products Hives     Seafood Hives     Contrast Dye      Diagnostic X-Ray Materials      Gadolinium      Family History  Family History   Problem Relation Age of Onset     Sickle Cell Trait Mother      Hypertension Mother      Asthma Mother      Sickle Cell Trait Father      Social History   Social History     Tobacco Use     Smoking status: Never Smoker     Smokeless tobacco: Never Used   Substance Use Topics     Alcohol use: Not Currently     Alcohol/week: 0.0 standard drinks     Drug use: Never      Past medical history, past surgical history, medications, allergies, family history, and social history were reviewed with the patient. No additional pertinent items.       Review of Systems   Constitutional: Negative for chills, fatigue and fever.   HENT: Negative for congestion and sore throat.    Eyes: Negative for pain and visual disturbance.   Respiratory: Negative for cough, chest tightness and shortness of breath.    Cardiovascular: Negative for chest pain and palpitations.   Gastrointestinal: Negative for abdominal distention, abdominal pain, constipation, diarrhea, nausea and vomiting.   Genitourinary: Negative for difficulty urinating, dysuria, frequency and urgency.   Musculoskeletal:  "Positive for back pain. Negative for arthralgias, myalgias and neck pain.        Positive for generalized body pain   Skin: Negative for color change and rash.   Neurological: Negative for dizziness, weakness and headaches.   Psychiatric/Behavioral: Negative for confusion.     A complete review of systems was performed with pertinent positives and negatives noted in the HPI, and all other systems negative.    Physical Exam   BP: (!) 146/78  Pulse: 110  Temp: 98  F (36.7  C)  Resp: 16  Height: 162.6 cm (5' 4\")  Weight: 75 kg (165 lb 5.5 oz)  SpO2: 96 %  Physical Exam  Vitals signs and nursing note reviewed.   Constitutional:       General: She is not in acute distress.     Appearance: Normal appearance. She is not ill-appearing or toxic-appearing.      Comments: Appears uncomfortable due to pain.   HENT:      Head: Normocephalic and atraumatic.      Nose: Nose normal.      Mouth/Throat:      Mouth: Mucous membranes are moist.   Eyes:      Pupils: Pupils are equal, round, and reactive to light.   Neck:      Musculoskeletal: Normal range of motion. No neck rigidity.   Cardiovascular:      Rate and Rhythm: Normal rate.      Pulses: Normal pulses.      Heart sounds: Normal heart sounds.   Pulmonary:      Effort: Pulmonary effort is normal. No respiratory distress.      Breath sounds: Normal breath sounds.      Comments: Left chest wall port noted, clean dry and intact.  Chest:      Chest wall: No tenderness.   Abdominal:      General: Abdomen is flat. There is no distension.   Musculoskeletal: Normal range of motion.         General: No swelling or deformity.      Comments: No focal or midline tenderness to thoracic or lumbar spine.   Skin:     General: Skin is warm.      Capillary Refill: Capillary refill takes less than 2 seconds.   Neurological:      Mental Status: She is alert and oriented to person, place, and time.   Psychiatric:         Mood and Affect: Mood normal.         ED Course     8:34 PM  The patient was " seen and examined by Dr. Raul Diaz in Room ED 21.          Results for orders placed or performed during the hospital encounter of 03/01/21   CBC with platelets differential     Status: Abnormal   Result Value Ref Range    WBC 14.4 (H) 4.0 - 11.0 10e9/L    RBC Count 2.80 (L) 3.8 - 5.2 10e12/L    Hemoglobin 7.6 (L) 11.7 - 15.7 g/dL    Hematocrit 23.3 (L) 35.0 - 47.0 %    MCV 83 78 - 100 fl    MCH 27.1 26.5 - 33.0 pg    MCHC 32.6 31.5 - 36.5 g/dL    RDW 24.5 (H) 10.0 - 15.0 %    Platelet Count 654 (H) 150 - 450 10e9/L    Diff Method Manual Differential     % Neutrophils 61.8 %    % Lymphocytes 26.4 %    % Monocytes 8.2 %    % Eosinophils 1.8 %    % Basophils 1.8 %    Nucleated RBCs 2 (H) 0 /100    Absolute Neutrophil 8.9 (H) 1.6 - 8.3 10e9/L    Absolute Lymphocytes 3.8 0.8 - 5.3 10e9/L    Absolute Monocytes 1.2 0.0 - 1.3 10e9/L    Absolute Eosinophils 0.3 0.0 - 0.7 10e9/L    Absolute Basophils 0.3 (H) 0.0 - 0.2 10e9/L    Absolute Nucleated RBC 0.3     Anisocytosis Marked     Poikilocytosis Moderate     Polychromasia Slight     Sickle Cells Slight     Target Cells Slight     Platelet Estimate Confirming automated cell count    Comprehensive metabolic panel     Status: Abnormal   Result Value Ref Range    Sodium 136 133 - 144 mmol/L    Potassium 3.7 3.4 - 5.3 mmol/L    Chloride 105 94 - 109 mmol/L    Carbon Dioxide 26 20 - 32 mmol/L    Anion Gap 5 3 - 14 mmol/L    Glucose 98 70 - 99 mg/dL    Urea Nitrogen 12 7 - 30 mg/dL    Creatinine 0.54 0.52 - 1.04 mg/dL    GFR Estimate >90 >60 mL/min/[1.73_m2]    GFR Estimate If Black >90 >60 mL/min/[1.73_m2]    Calcium 9.0 8.5 - 10.1 mg/dL    Bilirubin Total 1.8 (H) 0.2 - 1.3 mg/dL    Albumin 3.8 3.4 - 5.0 g/dL    Protein Total 9.1 (H) 6.8 - 8.8 g/dL    Alkaline Phosphatase 78 40 - 150 U/L     (H) 0 - 50 U/L     (H) 0 - 45 U/L   Reticulocyte count     Status: Abnormal   Result Value Ref Range    % Retic 8.8 (H) 0.5 - 2.0 %    Absolute Retic 246.4 (H) 25 - 95 10e9/L      Medications   lactated ringers infusion ( Intravenous Rate/Dose Verify 3/2/21 0117)   morphine (PF) injection 2 mg (2 mg Intravenous Given 3/2/21 0126)        Assessments & Plan (with Medical Decision Making)   Patient with history of sickle cell disease presents emergency department with complaint of back pain that she describes as typical for her sickle cell pain crises.    Differential diagnosis includes sickle cell pain crisis, exacerbation of chronic pain, musculoskeletal injury, aplastic crisis, anemia    On arrival, patient mildly tachycardic, otherwise normal vital signs.  She appears uncomfortable due to the pain.  On physical exam, no point focal tenderness of the thoracic or lumbar spine.  No skin changes.  No signs of respiratory distress.  She is able to ambulate without issue.    Plan for basic labs include CBC, CMP, reticulocyte count.  Will administer pain medications per patient's protocol including 2 mg IV morphine every hour, IV lactated ringer infusion.    Labs are consistent with baseline.  Bilirubin and LFTs do appear to be slowly trending up, not unusual for sickle cell disease.  No abdominal pain to suggest acute gallbladder or liver pathology.    On reassessment, patient feels better after 2 doses IV morphine.  She would like to stay for third dose and reassess at that time.  Will sign out to overnight provider.    I have reviewed the nursing notes. I have reviewed the findings, diagnosis, plan and need for follow up with the patient.    New Prescriptions    No medications on file       Final diagnoses:   Sickle cell pain crisis (H)     IJered, am serving as a trained medical scribe to document services personally performed by Raul Diaz DO, based on the provider's statements to me.      Raul NEWMAN DO, was physically present and have reviewed and verified the accuracy of this note documented by Jered Carr.   --  Raul Diaz DO  [facility] EMERGENCY  DEPARTMENT  3/1/2021     Raul Diaz,   03/02/21 0138

## 2021-03-02 NOTE — ED PROVIDER NOTES
"     Emergency Department Patient Sign-out       Brief HPI:  This is a 20 year old female signed out to me by Dr. Diaz  See initial ED Provider note for details of the presentation.         Exam:   Patient Vitals for the past 24 hrs:   BP Temp Temp src Pulse Resp SpO2 Height Weight   03/02/21 0130 134/82 -- -- 110 -- 93 % -- --   03/02/21 0100 (!) 141/87 -- -- 110 -- 97 % -- --   03/02/21 0030 -- -- -- 102 -- 96 % -- --   03/02/21 0000 126/69 -- -- 102 -- 96 % -- --   03/01/21 2330 122/67 -- -- 108 -- -- -- --   03/01/21 2300 114/66 -- -- 110 -- 97 % -- --   03/01/21 2230 (!) 131/103 -- -- 112 -- 95 % -- --   03/01/21 2200 121/75 -- -- 105 -- 99 % -- --   03/01/21 2130 109/56 -- -- 108 -- 97 % -- --   03/01/21 2114 -- -- -- -- -- -- -- 75 kg (165 lb 5.5 oz)   03/01/21 2100 103/56 -- -- 104 -- 95 % -- --   03/01/21 2045 107/61 -- -- -- -- 98 % -- --   03/01/21 2009 (!) 146/78 98  F (36.7  C) Oral 110 16 96 % 1.626 m (5' 4\") --           ED RESULTS:   Results for orders placed or performed during the hospital encounter of 03/01/21 (from the past 24 hour(s))   CBC with platelets differential     Status: Abnormal    Collection Time: 03/01/21  8:43 PM   Result Value Ref Range    WBC 14.4 (H) 4.0 - 11.0 10e9/L    RBC Count 2.80 (L) 3.8 - 5.2 10e12/L    Hemoglobin 7.6 (L) 11.7 - 15.7 g/dL    Hematocrit 23.3 (L) 35.0 - 47.0 %    MCV 83 78 - 100 fl    MCH 27.1 26.5 - 33.0 pg    MCHC 32.6 31.5 - 36.5 g/dL    RDW 24.5 (H) 10.0 - 15.0 %    Platelet Count 654 (H) 150 - 450 10e9/L    Diff Method Manual Differential     % Neutrophils 61.8 %    % Lymphocytes 26.4 %    % Monocytes 8.2 %    % Eosinophils 1.8 %    % Basophils 1.8 %    Nucleated RBCs 2 (H) 0 /100    Absolute Neutrophil 8.9 (H) 1.6 - 8.3 10e9/L    Absolute Lymphocytes 3.8 0.8 - 5.3 10e9/L    Absolute Monocytes 1.2 0.0 - 1.3 10e9/L    Absolute Eosinophils 0.3 0.0 - 0.7 10e9/L    Absolute Basophils 0.3 (H) 0.0 - 0.2 10e9/L    Absolute Nucleated RBC 0.3     Anisocytosis " Marked     Poikilocytosis Moderate     Polychromasia Slight     Sickle Cells Slight     Target Cells Slight     Platelet Estimate Confirming automated cell count    Comprehensive metabolic panel     Status: Abnormal    Collection Time: 03/01/21  8:43 PM   Result Value Ref Range    Sodium 136 133 - 144 mmol/L    Potassium 3.7 3.4 - 5.3 mmol/L    Chloride 105 94 - 109 mmol/L    Carbon Dioxide 26 20 - 32 mmol/L    Anion Gap 5 3 - 14 mmol/L    Glucose 98 70 - 99 mg/dL    Urea Nitrogen 12 7 - 30 mg/dL    Creatinine 0.54 0.52 - 1.04 mg/dL    GFR Estimate >90 >60 mL/min/[1.73_m2]    GFR Estimate If Black >90 >60 mL/min/[1.73_m2]    Calcium 9.0 8.5 - 10.1 mg/dL    Bilirubin Total 1.8 (H) 0.2 - 1.3 mg/dL    Albumin 3.8 3.4 - 5.0 g/dL    Protein Total 9.1 (H) 6.8 - 8.8 g/dL    Alkaline Phosphatase 78 40 - 150 U/L     (H) 0 - 50 U/L     (H) 0 - 45 U/L   Reticulocyte count     Status: Abnormal    Collection Time: 03/01/21  8:43 PM   Result Value Ref Range    % Retic 8.8 (H) 0.5 - 2.0 %    Absolute Retic 246.4 (H) 25 - 95 10e9/L       ED MEDICATIONS:   Medications   lactated ringers infusion ( Intravenous Rate/Dose Verify 3/2/21 0117)   morphine (PF) injection 2 mg (2 mg Intravenous Given 3/2/21 0126)         Impression:    ICD-10-CM    1. Sickle cell pain crisis (H)  D57.00        Plan:    Pending studies include reevaluation after pain medication.    Patient here with sickle cell pain crisis.  On reevaluation after pain medication patient reports improvement of her symptoms and felt comfortable discharge home.  Encouraged her to follow-up closely with her primary care provider and return precautions discussed if high fever, severe pain, chest pain, shortness of breath, any worsening symptoms.  Patient understands agrees the plan.      MD Mario Kruse Linsey Gail, MD  03/02/21 0327

## 2021-03-02 NOTE — PROGRESS NOTES
Social Work Follow-Up Encounter Visit  Oncology Clinic    Data/Intervention:  Patient Name:  Jennifer Cervantes DOB/Age:  2000 (20 year old)    Reason for Follow-Up: Transportation supports    Collaborated With:    -Patient  -[facility] staff    Assessment:  SW followed up on referral from patient's care team. SW called and spoke with patient informing them they have two upcoming visits 3/25 and 3/26 and checked in with patient about transportation supports needed. Patient was aware they have transportation services through their insurance. Patient stated they have used the service before and have set up a ride through the service. Patient confirmed they have the number and agreed they could set up rides for the two appointments mentioned above. SW checked in about other supportive needs. Patient identified no other supports needed at this time.     Plan:  Previously provided patient/family with writer's contact information and availability.   SW will remain available as needed.     Corry GONZALEZ, NATALIA  - Oncology  Phone : [phone]  Pager: [phone]

## 2021-03-03 DIAGNOSIS — D57.00 SICKLE CELL PAIN CRISIS (H): Primary | ICD-10-CM

## 2021-03-03 RX ORDER — OXYCODONE HYDROCHLORIDE 15 MG/1
15 TABLET ORAL EVERY 6 HOURS PRN
Qty: 60 TABLET | Refills: 0 | Status: SHIPPED | OUTPATIENT
Start: 2021-03-03 | End: 2021-03-18

## 2021-03-03 NOTE — TELEPHONE ENCOUNTER
Narcotic Refill Request    Date of most recent appointment:  2/24 with Andrei HIGGINS  Next upcoming appointment:   3/26 with Dr. Duncan    Medication(s) requested:  Oxycodone 15mg tablet,   Quantity:  60  Route: oral  Last fill date and by whom:  2/17/21 by Andrei HIGGINS  Person requesting refill:  patient   Reviewed: not an agent    How often is pt using?1 tablet every 6 hours for severe pain  Does pt have enough for today? Yes  How many days left? Will be out by end of today  What pain is the medication treating: Lower back  Is pain being adequately controlled on the current regimen?: Yes with intermittent pain crisis IVF/Pain infusions  Experiencing any side effects from medication?: denies  Other Notes:  Denies other symptoms    11:12pm Routed/Paged provider Andrei HIGGINS

## 2021-03-04 ENCOUNTER — HOSPITAL ENCOUNTER (INPATIENT)
Facility: CLINIC | Age: 21
LOS: 12 days | Discharge: HOME | DRG: 811 | End: 2021-03-16
Attending: EMERGENCY MEDICINE | Admitting: INTERNAL MEDICINE
Payer: COMMERCIAL

## 2021-03-04 ENCOUNTER — TELEPHONE (OUTPATIENT)
Dept: ONCOLOGY | Facility: CLINIC | Age: 21
End: 2021-03-04

## 2021-03-04 DIAGNOSIS — D57.00 SICKLE CELL PAIN CRISIS (H): ICD-10-CM

## 2021-03-04 DIAGNOSIS — E83.111 IRON OVERLOAD DUE TO REPEATED RED BLOOD CELL TRANSFUSIONS: ICD-10-CM

## 2021-03-04 DIAGNOSIS — Z11.52 ENCOUNTER FOR SCREENING LABORATORY TESTING FOR SEVERE ACUTE RESPIRATORY SYNDROME CORONAVIRUS 2 (SARS-COV-2): ICD-10-CM

## 2021-03-04 LAB
ABO + RH BLD: NORMAL
ABO + RH BLD: NORMAL
ANION GAP SERPL CALCULATED.3IONS-SCNC: 4 MMOL/L (ref 3–14)
BASOPHILS # BLD AUTO: 0.2 10E9/L (ref 0–0.2)
BASOPHILS NFR BLD AUTO: 1.7 %
BLD GP AB SCN SERPL QL: NORMAL
BLOOD BANK CMNT PATIENT-IMP: NORMAL
BUN SERPL-MCNC: 10 MG/DL (ref 7–30)
CALCIUM SERPL-MCNC: 8.9 MG/DL (ref 8.5–10.1)
CHLORIDE SERPL-SCNC: 109 MMOL/L (ref 94–109)
CO2 SERPL-SCNC: 24 MMOL/L (ref 20–32)
CREAT SERPL-MCNC: 0.5 MG/DL (ref 0.52–1.04)
CRP SERPL-MCNC: 6.8 MG/L (ref 0–8)
DIFFERENTIAL METHOD BLD: ABNORMAL
EOSINOPHIL # BLD AUTO: 0.9 10E9/L (ref 0–0.7)
EOSINOPHIL NFR BLD AUTO: 6.5 %
ERYTHROCYTE [DISTWIDTH] IN BLOOD BY AUTOMATED COUNT: 25.4 % (ref 10–15)
GFR SERPL CREATININE-BSD FRML MDRD: >90 ML/MIN/{1.73_M2}
GLUCOSE SERPL-MCNC: 93 MG/DL (ref 70–99)
HCT VFR BLD AUTO: 25 % (ref 35–47)
HGB BLD-MCNC: 8.3 G/DL (ref 11.7–15.7)
IMM GRANULOCYTES # BLD: 0 10E9/L (ref 0–0.4)
IMM GRANULOCYTES NFR BLD: 0.3 %
LABORATORY COMMENT REPORT: NORMAL
LYMPHOCYTES # BLD AUTO: 3.1 10E9/L (ref 0.8–5.3)
LYMPHOCYTES NFR BLD AUTO: 23.2 %
MCH RBC QN AUTO: 27.9 PG (ref 26.5–33)
MCHC RBC AUTO-ENTMCNC: 33.2 G/DL (ref 31.5–36.5)
MCV RBC AUTO: 84 FL (ref 78–100)
MONOCYTES # BLD AUTO: 0.9 10E9/L (ref 0–1.3)
MONOCYTES NFR BLD AUTO: 7 %
NEUTROPHILS # BLD AUTO: 8.2 10E9/L (ref 1.6–8.3)
NEUTROPHILS NFR BLD AUTO: 61.3 %
NRBC # BLD AUTO: 0.3 10*3/UL
NRBC BLD AUTO-RTO: 2 /100
PLATELET # BLD AUTO: 578 10E9/L (ref 150–450)
POTASSIUM SERPL-SCNC: 3.6 MMOL/L (ref 3.4–5.3)
PROCALCITONIN SERPL-MCNC: 0.16 NG/ML
RBC # BLD AUTO: 2.97 10E12/L (ref 3.8–5.2)
RETICS # AUTO: 443.7 10E9/L (ref 25–95)
RETICS/RBC NFR AUTO: 14.9 % (ref 0.5–2)
SARS-COV-2 RNA RESP QL NAA+PROBE: NEGATIVE
SODIUM SERPL-SCNC: 136 MMOL/L (ref 133–144)
SPECIMEN EXP DATE BLD: NORMAL
SPECIMEN SOURCE: NORMAL
WBC # BLD AUTO: 13.3 10E9/L (ref 4–11)

## 2021-03-04 PROCEDURE — 85025 COMPLETE CBC W/AUTO DIFF WBC: CPT | Performed by: EMERGENCY MEDICINE

## 2021-03-04 PROCEDURE — 250N000011 HC RX IP 250 OP 636: Performed by: NURSE PRACTITIONER

## 2021-03-04 PROCEDURE — U0005 INFEC AGEN DETEC AMPLI PROBE: HCPCS | Performed by: EMERGENCY MEDICINE

## 2021-03-04 PROCEDURE — 86850 RBC ANTIBODY SCREEN: CPT | Performed by: EMERGENCY MEDICINE

## 2021-03-04 PROCEDURE — 99207 PR CDG-MDM COMPONENT: MEETS HIGH - UP CODED: CPT | Performed by: INTERNAL MEDICINE

## 2021-03-04 PROCEDURE — 99223 1ST HOSP IP/OBS HIGH 75: CPT | Mod: AI | Performed by: INTERNAL MEDICINE

## 2021-03-04 PROCEDURE — 85045 AUTOMATED RETICULOCYTE COUNT: CPT | Performed by: EMERGENCY MEDICINE

## 2021-03-04 PROCEDURE — 84145 PROCALCITONIN (PCT): CPT | Performed by: EMERGENCY MEDICINE

## 2021-03-04 PROCEDURE — 258N000003 HC RX IP 258 OP 636: Performed by: NURSE PRACTITIONER

## 2021-03-04 PROCEDURE — 250N000013 HC RX MED GY IP 250 OP 250 PS 637: Performed by: EMERGENCY MEDICINE

## 2021-03-04 PROCEDURE — 99285 EMERGENCY DEPT VISIT HI MDM: CPT | Performed by: EMERGENCY MEDICINE

## 2021-03-04 PROCEDURE — 99285 EMERGENCY DEPT VISIT HI MDM: CPT | Mod: 25 | Performed by: EMERGENCY MEDICINE

## 2021-03-04 PROCEDURE — 99207 PR APP CREDIT; MD BILLING SHARED VISIT: CPT | Performed by: NURSE PRACTITIONER

## 2021-03-04 PROCEDURE — 80048 BASIC METABOLIC PNL TOTAL CA: CPT | Performed by: EMERGENCY MEDICINE

## 2021-03-04 PROCEDURE — 96374 THER/PROPH/DIAG INJ IV PUSH: CPT | Performed by: EMERGENCY MEDICINE

## 2021-03-04 PROCEDURE — U0003 INFECTIOUS AGENT DETECTION BY NUCLEIC ACID (DNA OR RNA); SEVERE ACUTE RESPIRATORY SYNDROME CORONAVIRUS 2 (SARS-COV-2) (CORONAVIRUS DISEASE [COVID-19]), AMPLIFIED PROBE TECHNIQUE, MAKING USE OF HIGH THROUGHPUT TECHNOLOGIES AS DESCRIBED BY CMS-2020-01-R: HCPCS | Performed by: EMERGENCY MEDICINE

## 2021-03-04 PROCEDURE — 96375 TX/PRO/DX INJ NEW DRUG ADDON: CPT | Performed by: EMERGENCY MEDICINE

## 2021-03-04 PROCEDURE — 86900 BLOOD TYPING SEROLOGIC ABO: CPT | Performed by: EMERGENCY MEDICINE

## 2021-03-04 PROCEDURE — 86140 C-REACTIVE PROTEIN: CPT | Performed by: EMERGENCY MEDICINE

## 2021-03-04 PROCEDURE — 120N000002 HC R&B MED SURG/OB UMMC

## 2021-03-04 PROCEDURE — 96361 HYDRATE IV INFUSION ADD-ON: CPT | Performed by: EMERGENCY MEDICINE

## 2021-03-04 PROCEDURE — 250N000011 HC RX IP 250 OP 636: Performed by: EMERGENCY MEDICINE

## 2021-03-04 PROCEDURE — 86901 BLOOD TYPING SEROLOGIC RH(D): CPT | Performed by: EMERGENCY MEDICINE

## 2021-03-04 PROCEDURE — 96376 TX/PRO/DX INJ SAME DRUG ADON: CPT | Performed by: EMERGENCY MEDICINE

## 2021-03-04 RX ORDER — AMOXICILLIN 250 MG
1 CAPSULE ORAL 2 TIMES DAILY
Status: DISCONTINUED | OUTPATIENT
Start: 2021-03-04 | End: 2021-03-16 | Stop reason: HOSPADM

## 2021-03-04 RX ORDER — DIPHENHYDRAMINE HCL 25 MG
25-50 CAPSULE ORAL
Status: DISCONTINUED | OUTPATIENT
Start: 2021-03-04 | End: 2021-03-16 | Stop reason: HOSPADM

## 2021-03-04 RX ORDER — POLYETHYLENE GLYCOL 3350 17 G/17G
17 POWDER, FOR SOLUTION ORAL DAILY PRN
Status: DISCONTINUED | OUTPATIENT
Start: 2021-03-04 | End: 2021-03-16 | Stop reason: HOSPADM

## 2021-03-04 RX ORDER — CELECOXIB 100 MG/1
100 CAPSULE ORAL 2 TIMES DAILY PRN
Status: DISCONTINUED | OUTPATIENT
Start: 2021-03-04 | End: 2021-03-05

## 2021-03-04 RX ORDER — AMOXICILLIN 250 MG
2 CAPSULE ORAL 2 TIMES DAILY PRN
Status: DISCONTINUED | OUTPATIENT
Start: 2021-03-04 | End: 2021-03-16 | Stop reason: HOSPADM

## 2021-03-04 RX ORDER — ONDANSETRON 4 MG/1
4 TABLET, ORALLY DISINTEGRATING ORAL EVERY 6 HOURS PRN
Status: DISCONTINUED | OUTPATIENT
Start: 2021-03-04 | End: 2021-03-16 | Stop reason: HOSPADM

## 2021-03-04 RX ORDER — LIDOCAINE 4 G/G
1-3 PATCH TOPICAL
Status: DISCONTINUED | OUTPATIENT
Start: 2021-03-04 | End: 2021-03-16 | Stop reason: HOSPADM

## 2021-03-04 RX ORDER — ONDANSETRON 2 MG/ML
4 INJECTION INTRAMUSCULAR; INTRAVENOUS EVERY 6 HOURS PRN
Status: DISCONTINUED | OUTPATIENT
Start: 2021-03-04 | End: 2021-03-16 | Stop reason: HOSPADM

## 2021-03-04 RX ORDER — DOCUSATE SODIUM 100 MG/1
100 CAPSULE, LIQUID FILLED ORAL 2 TIMES DAILY
Status: DISCONTINUED | OUTPATIENT
Start: 2021-03-04 | End: 2021-03-16 | Stop reason: HOSPADM

## 2021-03-04 RX ORDER — ALBUTEROL SULFATE 90 UG/1
2 AEROSOL, METERED RESPIRATORY (INHALATION) EVERY 6 HOURS PRN
Status: DISCONTINUED | OUTPATIENT
Start: 2021-03-04 | End: 2021-03-16 | Stop reason: HOSPADM

## 2021-03-04 RX ORDER — SERTRALINE HYDROCHLORIDE 100 MG/1
200 TABLET, FILM COATED ORAL DAILY
Status: DISCONTINUED | OUTPATIENT
Start: 2021-03-05 | End: 2021-03-16 | Stop reason: HOSPADM

## 2021-03-04 RX ORDER — DIPHENHYDRAMINE HCL 25 MG
25 CAPSULE ORAL ONCE
Status: COMPLETED | OUTPATIENT
Start: 2021-03-04 | End: 2021-03-04

## 2021-03-04 RX ORDER — ACETAMINOPHEN 325 MG/1
650 TABLET ORAL EVERY 4 HOURS PRN
Status: DISCONTINUED | OUTPATIENT
Start: 2021-03-04 | End: 2021-03-15

## 2021-03-04 RX ORDER — AMOXICILLIN 250 MG
2 CAPSULE ORAL 2 TIMES DAILY
Status: DISCONTINUED | OUTPATIENT
Start: 2021-03-04 | End: 2021-03-16 | Stop reason: HOSPADM

## 2021-03-04 RX ORDER — HYDROMORPHONE HYDROCHLORIDE 1 MG/ML
0.5 INJECTION, SOLUTION INTRAMUSCULAR; INTRAVENOUS; SUBCUTANEOUS
Status: DISCONTINUED | OUTPATIENT
Start: 2021-03-04 | End: 2021-03-07

## 2021-03-04 RX ORDER — MORPHINE SULFATE 2 MG/ML
2 INJECTION, SOLUTION INTRAMUSCULAR; INTRAVENOUS
Status: DISCONTINUED | OUTPATIENT
Start: 2021-03-04 | End: 2021-03-07

## 2021-03-04 RX ORDER — AMOXICILLIN 250 MG
1 CAPSULE ORAL 2 TIMES DAILY PRN
Status: DISCONTINUED | OUTPATIENT
Start: 2021-03-04 | End: 2021-03-16 | Stop reason: HOSPADM

## 2021-03-04 RX ORDER — DEFERASIROX 360 MG/1
1440 TABLET, FILM COATED ORAL EVERY EVENING
Status: DISCONTINUED | OUTPATIENT
Start: 2021-03-04 | End: 2021-03-05 | Stop reason: CLARIF

## 2021-03-04 RX ORDER — LIDOCAINE 40 MG/G
CREAM TOPICAL
Status: DISCONTINUED | OUTPATIENT
Start: 2021-03-04 | End: 2021-03-16 | Stop reason: HOSPADM

## 2021-03-04 RX ORDER — SODIUM CHLORIDE 9 MG/ML
INJECTION, SOLUTION INTRAVENOUS CONTINUOUS
Status: DISCONTINUED | OUTPATIENT
Start: 2021-03-04 | End: 2021-03-16 | Stop reason: HOSPADM

## 2021-03-04 RX ORDER — SODIUM CHLORIDE, SODIUM LACTATE, POTASSIUM CHLORIDE, CALCIUM CHLORIDE 600; 310; 30; 20 MG/100ML; MG/100ML; MG/100ML; MG/100ML
INJECTION, SOLUTION INTRAVENOUS CONTINUOUS
Status: ACTIVE | OUTPATIENT
Start: 2021-03-04 | End: 2021-03-05

## 2021-03-04 RX ORDER — HYDROXYUREA 500 MG/1
2000 CAPSULE ORAL EVERY EVENING
Status: DISCONTINUED | OUTPATIENT
Start: 2021-03-05 | End: 2021-03-16 | Stop reason: HOSPADM

## 2021-03-04 RX ADMIN — SODIUM CHLORIDE, POTASSIUM CHLORIDE, SODIUM LACTATE AND CALCIUM CHLORIDE: 600; 310; 30; 20 INJECTION, SOLUTION INTRAVENOUS at 22:55

## 2021-03-04 RX ADMIN — MORPHINE SULFATE 2 MG: 2 INJECTION, SOLUTION INTRAMUSCULAR; INTRAVENOUS at 16:05

## 2021-03-04 RX ADMIN — Medication: at 22:55

## 2021-03-04 RX ADMIN — HYDROMORPHONE HYDROCHLORIDE 0.5 MG: 1 INJECTION, SOLUTION INTRAMUSCULAR; INTRAVENOUS; SUBCUTANEOUS at 18:03

## 2021-03-04 RX ADMIN — MORPHINE SULFATE 2 MG: 2 INJECTION, SOLUTION INTRAMUSCULAR; INTRAVENOUS at 14:52

## 2021-03-04 RX ADMIN — HYDROMORPHONE HYDROCHLORIDE 0.5 MG: 1 INJECTION, SOLUTION INTRAMUSCULAR; INTRAVENOUS; SUBCUTANEOUS at 15:19

## 2021-03-04 RX ADMIN — DIPHENHYDRAMINE HYDROCHLORIDE 25 MG: 25 CAPSULE ORAL at 14:53

## 2021-03-04 ASSESSMENT — MIFFLIN-ST. JEOR
SCORE: 1493.44
SCORE: 1494.8

## 2021-03-04 ASSESSMENT — ENCOUNTER SYMPTOMS
COLOR CHANGE: 0
CONFUSION: 0
EYE REDNESS: 0
SHORTNESS OF BREATH: 0
HEADACHES: 0
NECK STIFFNESS: 0
FEVER: 0
ARTHRALGIAS: 0
DIFFICULTY URINATING: 0
ABDOMINAL PAIN: 0

## 2021-03-04 NOTE — TELEPHONE ENCOUNTER
[facility] Telephone Triage Note    The following symptoms were reported:     Description:            Onset:  Chronic, worse overnight   Location: back  Character: Sharp           Intensity: severe    Accompanying Signs & Symptoms:  none    Chest Pain:  denies     Shortness of Breath:  denies     Fever:  denies     Chills:  denies   Cough/sore throat:  denies  Other:  n/a    Therapies Tried and outcome: She tried her home rescue meds overnight and taking warm bath/shower without relief    Improved by: nothing    The following provider was consulted:  Meets protocol     The following advice/orders were given, and/or interventions recommended:  Advised Jennifer that we would add her to the waitlist and call her back if we are able to get her in. She voiced understanding.      Patient instructions and/or follow up:  Advised Jennifer that currently, there is no availability for infusion (12:06 pm) will leave name on waitlist in case something opens up but does not look like we will be able to get her in today. She voiced understanding and we will touch base in the morning.

## 2021-03-04 NOTE — ED PROVIDER NOTES
[place] EMERGENCY DEPARTMENT ([facility])  March 4, 2021  History     Chief Complaint   Patient presents with     Sickle Cell Pain Crisis     The history is provided by the patient and medical records.     Jennifer Cervantes is a 21 year old female with a past medical history significant for sickle cell anemia (care plan in place), PE, asthma, and hemiplegia who presents to the ED for evaluation of suspected sickle cell pain crisis.  Patient reports she has diffuse pain throughout her entire body that began yesterday.  She reports this is similar to her previous episodes of sickle cell pain crises in the past.  When asked if there is one particular area that hurts the most, patient denied and reports that her pain is diffuse.  Patient reports he has tried taking her home oxycodone with no resolution of her pain.  She reports she last took this 1.5 hours prior to ED arrival.  Patient denies any fevers, chills, cough, sore throat, or other URI symptoms.  No chest pain, or shortness of breath.  Patient reports there is nothing different about this particular episode of sickle cell crises compared to her previous.        PAST MEDICAL HISTORY:   Past Medical History:   Diagnosis Date     Anemia      Anxiety      Bleeding disorder (H)      Blood clotting disorder (H)      Cerebral infarction (H) 2015     Cognitive developmental delay     low IQ. 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 (H)     right hand contractures     Iron overload due to repeated red blood cell transfusions      Migraines      Multiple subsegmental pulmonary emboli without acute cor pulmonale (H) 02/01/2021     Oppositional defiant behavior      Uncomplicated asthma        PAST SURGICAL HISTORY:   Past Surgical History:   Procedure Laterality Date     AS INSERT TUNNELED CV 2 CATH W/O PORT/PUMP       C BREAST  REDUCTION (INCLUDES LIPO) TIER 3 Bilateral 04/23/2019     CHOLECYSTECTOMY       IR CVC NON TUNNEL PLACEMENT  04/07/2020     REPAIR TENDON ELBOW Right 10/02/2019    Procedure: Right Elbow Flexor Lengthening, Flexor Pronator Slide Of Wrist and Finger, Thumb Adductor Lengthening;  Surgeon: Anai Franco MD;  Location: [facility] OR     TONSILLECTOMY Bilateral 10/02/2019    Procedure: Bilateral Tonsillectomy;  Surgeon: Farhana Guy MD;  Location: [facility] OR       Past medical history, past surgical history, medications, and allergies were reviewed with the patient. Additional pertinent items: None    FAMILY HISTORY:   Family History   Problem Relation Age of Onset     Sickle Cell Trait Mother      Hypertension Mother      Asthma Mother      Sickle Cell Trait Father        SOCIAL HISTORY:   Social History     Tobacco Use     Smoking status: Never Smoker     Smokeless tobacco: Never Used   Substance Use Topics     Alcohol use: Not Currently     Alcohol/week: 0.0 standard drinks     Social history was reviewed with the patient. Additional pertinent items: None      Patient's Medications   New Prescriptions    No medications on file   Previous Medications    ACETAMINOPHEN (TYLENOL) 325 MG TABLET    Take 2 tablets (650 mg) by mouth every 6 hours as needed for mild pain    ALBUTEROL (PROAIR HFA/PROVENTIL HFA/VENTOLIN HFA) 108 (90 BASE) MCG/ACT INHALER    Inhale 2 puffs into the lungs every 6 hours as needed for shortness of breath / dyspnea or wheezing    ALBUTEROL (PROVENTIL) (2.5 MG/3ML) 0.083% NEB SOLUTION    Take 1 vial (2.5 mg) by nebulization every 6 hours as needed for shortness of breath / dyspnea or wheezing    ASPIRIN (ASPIRIN) 81 MG EC TABLET    Take 1 tablet (81 mg) by mouth daily . HOLD while on Xarelto    BUDESONIDE-FORMOTEROL (SYMBICORT) 160-4.5 MCG/ACT INHALER    Inhale 2 puffs into the lungs 2 times daily    CELECOXIB (CELEBREX) 100 MG CAPSULE    Take 1 capsule (100 mg) by mouth 2 times daily     DIPHENHYDRAMINE (BENADRYL) 25 MG CAPSULE    Take 1-2 capsules (25-50 mg) by mouth nightly as needed for sleep    EPINEPHRINE (ANY BX GENERIC EQUIV) 0.3 MG/0.3ML INJECTION 2-PACK    Inject 0.3 mLs (0.3 mg) into the muscle as needed for anaphylaxis    HYDROXYUREA 1000 MG TABS    Take 2,000 mg by mouth daily    JADENU 360 MG TABLET    Take 4 tablets (1,440 mg) by mouth daily    MEDROXYPROGESTERONE (DEPO-PROVERA) 150 MG/ML IM INJECTION    Inject 150 mg into the muscle    NALOXONE (NARCAN) 4 MG/0.1ML NASAL SPRAY    Spray 1 spray (4 mg) into one nostril alternating nostrils once as needed for opioid reversal every 2-3 minutes until assistance arrives    ONDANSETRON (ZOFRAN) 8 MG TABLET        OXYCODONE IR (ROXICODONE) 15 MG TABLET    Take 1 tablet (15 mg) by mouth every 6 hours as needed for severe pain    RIVAROXABAN ANTICOAGULANT (XARELTO) 20 MG TABS TABLET    Take 1 tablet (20 mg) by mouth daily (with dinner)    SERTRALINE (ZOLOFT) 100 MG TABLET    Take 2 tablets (200 mg) by mouth daily   Modified Medications    No medications on file   Discontinued Medications    No medications on file          Allergies   Allergen Reactions     Fish-Derived Products Hives     Seafood Hives     Contrast Dye      Diagnostic X-Ray Materials      Gadolinium         Review of Systems   Constitutional: Negative for fever.   HENT: Negative for congestion.    Eyes: Negative for redness.   Respiratory: Negative for shortness of breath.    Cardiovascular: Negative for chest pain.   Gastrointestinal: Negative for abdominal pain.   Genitourinary: Negative for difficulty urinating.   Musculoskeletal: Negative for arthralgias and neck stiffness.        Diffuse pain all over   Skin: Negative for color change.   Neurological: Negative for headaches.   Psychiatric/Behavioral: Negative for confusion.   All other systems reviewed and are negative.        Physical Exam   BP: 129/78  Pulse: 104  Temp: 98  F (36.7  C)  Resp: 12  Height: 162.6 cm (5'  "4\")  Weight: 74.8 kg (165 lb)  SpO2: 97 %      Physical Exam  Vitals signs and nursing note reviewed.   Constitutional:       General: She is not in acute distress.     Appearance: Normal appearance. She is not diaphoretic.   HENT:      Head: Atraumatic.      Mouth/Throat:      Pharynx: No oropharyngeal exudate.   Eyes:      General: No scleral icterus.     Pupils: Pupils are equal, round, and reactive to light.   Cardiovascular:      Rate and Rhythm: Normal rate and regular rhythm.      Heart sounds: Normal heart sounds.   Pulmonary:      Effort: No respiratory distress.      Breath sounds: Normal breath sounds.   Abdominal:      General: Bowel sounds are normal.      Palpations: Abdomen is soft.      Tenderness: There is no abdominal tenderness.   Musculoskeletal:         General: No tenderness.   Skin:     General: Skin is warm.      Findings: No rash.   Neurological:      General: No focal deficit present.      Mental Status: She is alert and oriented to person, place, and time.   Psychiatric:         Mood and Affect: Mood normal.         ED Course   2:28 PM  The patient was seen and examined by Dmitri Thomas MD in Room ED09.        Procedures                           No results found for this or any previous visit (from the past 24 hour(s)).  Medications - No data to display          Assessments & Plan (with Medical Decision Making)     21 year old female with a past medical history significant for sickle cell anemia (care plan in place), PE, asthma, and hemiplegia who presents to the ED for evaluation of suspected sickle cell pain crisis.  Patient presentation is consistent with her typical crisis pain, no suggestion of acute chest syndrome or infectious process.  IV is established, labs are drawn sent reviewed and document epic and remarkable for hemoglobin 8.3, leukocytosis of 13.3, reticulocyte count of 14.9% otherwise normal CBC and electrolytes.  COVID-19 swab obtained and negative.  Patient was initiated " on her care plan including morphine 2 mg IV however patient was tearful with severity of pain 30 minutes after morphine administration so was given an additional dose of Dilaudid 0.5 mg IV on top of her morphine regimen.  She was also given normal saline IV fluid bolus and Benadryl 25 mg p.o.  Upon repeat evaluation patient's pain not adequately controlled and she is requesting admission to the hospital, case discussed with medicine hospital service who agreed with plan.    I have reviewed the nursing notes.    I have reviewed the findings, diagnosis, plan and need for follow up with the patient.    New Prescriptions    No medications on file       Final diagnoses:   Sickle cell pain crisis (H)   IEvangelist, am serving as a trained medical scribe to document services personally performed by Dmitri Thomas MD, based on the provider's statements to me.      Dmitri NEWMAN MD, was physically present and have reviewed and verified the accuracy of this note documented by Evangelist Bradshaw.       3/4/2021   [facility] EMERGENCY DEPARTMENT     Dmitri Thomas MD  03/04/21 2017

## 2021-03-04 NOTE — ED TRIAGE NOTES
Pt arrives ambulatory with c/o Sickle Cell Pain Crisis. Pt has global pain unresponsive to rescue meds at home. Pt called clinic and was unable to get an paresh't for today.

## 2021-03-05 LAB
ALBUMIN SERPL-MCNC: 3.3 G/DL (ref 3.4–5)
ALBUMIN UR-MCNC: NEGATIVE MG/DL
ALP SERPL-CCNC: 65 U/L (ref 40–150)
ALT SERPL W P-5'-P-CCNC: 129 U/L (ref 0–50)
ANION GAP SERPL CALCULATED.3IONS-SCNC: 5 MMOL/L (ref 3–14)
APPEARANCE UR: CLEAR
AST SERPL W P-5'-P-CCNC: 108 U/L (ref 0–45)
BASOPHILS # BLD AUTO: 0.2 10E9/L (ref 0–0.2)
BASOPHILS NFR BLD AUTO: 2 %
BILIRUB SERPL-MCNC: 2 MG/DL (ref 0.2–1.3)
BILIRUB UR QL STRIP: NEGATIVE
BUN SERPL-MCNC: 8 MG/DL (ref 7–30)
CALCIUM SERPL-MCNC: 8.6 MG/DL (ref 8.5–10.1)
CHLORIDE SERPL-SCNC: 110 MMOL/L (ref 94–109)
CO2 SERPL-SCNC: 22 MMOL/L (ref 20–32)
COLOR UR AUTO: ABNORMAL
CREAT SERPL-MCNC: 0.51 MG/DL (ref 0.52–1.04)
DIFFERENTIAL METHOD BLD: ABNORMAL
EOSINOPHIL # BLD AUTO: 1 10E9/L (ref 0–0.7)
EOSINOPHIL NFR BLD AUTO: 8.7 %
ERYTHROCYTE [DISTWIDTH] IN BLOOD BY AUTOMATED COUNT: 25.7 % (ref 10–15)
FERRITIN SERPL-MCNC: 8430 NG/ML (ref 12–150)
GFR SERPL CREATININE-BSD FRML MDRD: >90 ML/MIN/{1.73_M2}
GLUCOSE SERPL-MCNC: 89 MG/DL (ref 70–99)
GLUCOSE UR STRIP-MCNC: NEGATIVE MG/DL
HCG UR QL: NEGATIVE
HCT VFR BLD AUTO: 22.3 % (ref 35–47)
HGB BLD-MCNC: 7.4 G/DL (ref 11.7–15.7)
HGB UR QL STRIP: NEGATIVE
IMM GRANULOCYTES # BLD: 0.1 10E9/L (ref 0–0.4)
IMM GRANULOCYTES NFR BLD: 0.4 %
KETONES UR STRIP-MCNC: NEGATIVE MG/DL
LACTATE BLD-SCNC: 1 MMOL/L (ref 0.7–2)
LEUKOCYTE ESTERASE UR QL STRIP: ABNORMAL
LYMPHOCYTES # BLD AUTO: 2.7 10E9/L (ref 0.8–5.3)
LYMPHOCYTES NFR BLD AUTO: 23.8 %
MCH RBC QN AUTO: 27.8 PG (ref 26.5–33)
MCHC RBC AUTO-ENTMCNC: 33.2 G/DL (ref 31.5–36.5)
MCV RBC AUTO: 84 FL (ref 78–100)
MONOCYTES # BLD AUTO: 0.9 10E9/L (ref 0–1.3)
MONOCYTES NFR BLD AUTO: 7.7 %
MUCOUS THREADS #/AREA URNS LPF: PRESENT /LPF
NEUTROPHILS # BLD AUTO: 6.5 10E9/L (ref 1.6–8.3)
NEUTROPHILS NFR BLD AUTO: 57.4 %
NITRATE UR QL: NEGATIVE
NRBC # BLD AUTO: 0.2 10*3/UL
NRBC BLD AUTO-RTO: 2 /100
PH UR STRIP: 5.5 PH (ref 5–7)
PLATELET # BLD AUTO: 545 10E9/L (ref 150–450)
POTASSIUM SERPL-SCNC: 3.6 MMOL/L (ref 3.4–5.3)
PROT SERPL-MCNC: 7.7 G/DL (ref 6.8–8.8)
RBC # BLD AUTO: 2.66 10E12/L (ref 3.8–5.2)
RBC #/AREA URNS AUTO: 0 /HPF (ref 0–2)
RETICS # AUTO: 462.8 10E9/L (ref 25–95)
RETICS/RBC NFR AUTO: 17.4 % (ref 0.5–2)
SODIUM SERPL-SCNC: 138 MMOL/L (ref 133–144)
SOURCE: ABNORMAL
SP GR UR STRIP: 1.01 (ref 1–1.03)
SQUAMOUS #/AREA URNS AUTO: 2 /HPF (ref 0–1)
UROBILINOGEN UR STRIP-MCNC: NORMAL MG/DL (ref 0–2)
WBC # BLD AUTO: 11.2 10E9/L (ref 4–11)
WBC #/AREA URNS AUTO: 2 /HPF (ref 0–5)

## 2021-03-05 PROCEDURE — 250N000013 HC RX MED GY IP 250 OP 250 PS 637: Performed by: NURSE PRACTITIONER

## 2021-03-05 PROCEDURE — 250N000011 HC RX IP 250 OP 636: Performed by: PEDIATRICS

## 2021-03-05 PROCEDURE — 36415 COLL VENOUS BLD VENIPUNCTURE: CPT | Performed by: NURSE PRACTITIONER

## 2021-03-05 PROCEDURE — 81025 URINE PREGNANCY TEST: CPT | Performed by: EMERGENCY MEDICINE

## 2021-03-05 PROCEDURE — 258N000003 HC RX IP 258 OP 636: Performed by: PEDIATRICS

## 2021-03-05 PROCEDURE — 99233 SBSQ HOSP IP/OBS HIGH 50: CPT | Performed by: INTERNAL MEDICINE

## 2021-03-05 PROCEDURE — 81001 URINALYSIS AUTO W/SCOPE: CPT | Performed by: NURSE PRACTITIONER

## 2021-03-05 PROCEDURE — 80053 COMPREHEN METABOLIC PANEL: CPT | Performed by: NURSE PRACTITIONER

## 2021-03-05 PROCEDURE — 85025 COMPLETE CBC W/AUTO DIFF WBC: CPT | Performed by: NURSE PRACTITIONER

## 2021-03-05 PROCEDURE — 99222 1ST HOSP IP/OBS MODERATE 55: CPT | Mod: GC | Performed by: INTERNAL MEDICINE

## 2021-03-05 PROCEDURE — 999N000127 HC STATISTIC PERIPHERAL IV START W US GUIDANCE

## 2021-03-05 PROCEDURE — 83605 ASSAY OF LACTIC ACID: CPT | Performed by: INTERNAL MEDICINE

## 2021-03-05 PROCEDURE — 85045 AUTOMATED RETICULOCYTE COUNT: CPT | Performed by: NURSE PRACTITIONER

## 2021-03-05 PROCEDURE — 36415 COLL VENOUS BLD VENIPUNCTURE: CPT | Performed by: INTERNAL MEDICINE

## 2021-03-05 PROCEDURE — 120N000002 HC R&B MED SURG/OB UMMC

## 2021-03-05 PROCEDURE — 82728 ASSAY OF FERRITIN: CPT | Performed by: NURSE PRACTITIONER

## 2021-03-05 RX ORDER — FOLIC ACID 1 MG/1
1 TABLET ORAL DAILY
Status: DISCONTINUED | OUTPATIENT
Start: 2021-03-06 | End: 2021-03-16 | Stop reason: HOSPADM

## 2021-03-05 RX ORDER — SODIUM CHLORIDE 9 MG/ML
INJECTION, SOLUTION INTRAVENOUS
Status: DISPENSED
Start: 2021-03-05 | End: 2021-03-05

## 2021-03-05 RX ORDER — CELECOXIB 100 MG/1
100 CAPSULE ORAL 2 TIMES DAILY
Status: DISCONTINUED | OUTPATIENT
Start: 2021-03-05 | End: 2021-03-05

## 2021-03-05 RX ORDER — CELECOXIB 100 MG/1
100 CAPSULE ORAL 2 TIMES DAILY
Status: DISCONTINUED | OUTPATIENT
Start: 2021-03-05 | End: 2021-03-16 | Stop reason: HOSPADM

## 2021-03-05 RX ADMIN — CELECOXIB 100 MG: 100 CAPSULE ORAL at 20:55

## 2021-03-05 RX ADMIN — SERTRALINE HYDROCHLORIDE 200 MG: 100 TABLET ORAL at 08:52

## 2021-03-05 RX ADMIN — RIVAROXABAN 20 MG: 20 TABLET, FILM COATED ORAL at 20:55

## 2021-03-05 RX ADMIN — DEFEROXAMINE MESYLATE 3000 MG: 95 INJECTION, POWDER, LYOPHILIZED, FOR SOLUTION INTRAMUSCULAR; INTRAVENOUS; SUBCUTANEOUS at 23:47

## 2021-03-05 RX ADMIN — HYDROXYUREA 2000 MG: 500 CAPSULE ORAL at 01:31

## 2021-03-05 RX ADMIN — FLUTICASONE FUROATE AND VILANTEROL TRIFENATATE 1 PUFF: 200; 25 POWDER RESPIRATORY (INHALATION) at 08:52

## 2021-03-05 RX ADMIN — RIVAROXABAN 20 MG: 20 TABLET, FILM COATED ORAL at 01:31

## 2021-03-05 RX ADMIN — HYDROXYUREA 2000 MG: 500 CAPSULE ORAL at 20:52

## 2021-03-05 ASSESSMENT — ACTIVITIES OF DAILY LIVING (ADL)
DIFFICULTY_EATING/SWALLOWING: NO
ADLS_ACUITY_SCORE: 11
TOILETING_ISSUES: NO
DRESSING/BATHING_DIFFICULTY: NO
WALKING_OR_CLIMBING_STAIRS_DIFFICULTY: NO
DOING_ERRANDS_INDEPENDENTLY_DIFFICULTY: NO
WEAR_GLASSES_OR_BLIND: NO
FALL_HISTORY_WITHIN_LAST_SIX_MONTHS: NO
ADLS_ACUITY_SCORE: 11
DIFFICULTY_COMMUNICATING: NO
ADLS_ACUITY_SCORE: 11
ADLS_ACUITY_SCORE: 12
CONCENTRATING,_REMEMBERING_OR_MAKING_DECISIONS_DIFFICULTY: NO
HEARING_DIFFICULTY_OR_DEAF: NO

## 2021-03-05 NOTE — PROGRESS NOTES
Shift: 0700 - 1530  VS: Temp: 98.1  F (36.7  C) Temp src: Oral BP: 127/73 Pulse: 87   Resp: 18 SpO2: 98 % O2 Device: None (Room air)    Pain: Sickle cell pain. Currently on Morphine GTT 1mg Q20min and 1mg/hr basal rate (4mg/hr).   Neuro: A&Ox4. Pleasant and cooperative with care.   Cardiac:   VSS.   Respiratory: Lung sounds clear on RA.   GI/Diet/Appetite: Regular diet with fair/good appetite. LBM 3/4. Denies nausea.   :  Voiding w/o difficulty, UA sent.   LDA's: Left port infusing Morphine GTT and TKO.  Skin: Intact.   Activity: SBA, Mild right hemiparesis with contraction to RUE. Able to ambulate, use of RUE is significantly impaired.   Tests/Procedures:   Pertinent Labs/Lab Collection:      Plan: Continue with POC.

## 2021-03-05 NOTE — PLAN OF CARE
"Patient came in to unit at shift change from ED, c/c back pain with PCA morphine ongoing at 1 mg continuous rate with 1 mg Q 20 mins bumps and lock out 4 mg. Pt still rated pain as 9/10, but no non verbal cues noted. Lactic triggered upon arrival vitals, result is 1.0 Tele discontinued, capno set up but removed at mid shift- pt c/o nasal irritation.  Denies SOB, clear LS. +BS, soft non tender abdomen, denies nausea, last BM yesterday. LR at 150 m/hr via left chest port. Will collect urine once voided. Uses commode. Right sided hemiparesis, SBA. Call light within reach. Is able to sleep this shift. Continue poc.   Vitals:    03/04/21 2330 03/04/21 2355 03/05/21 0030 03/05/21 0100   BP: 116/82 (!) 147/69 128/66 126/75   BP Location:  Left arm Left arm Left arm   Pulse:  101 97 91   Resp: 16 16 16 16   Temp:  98.5  F (36.9  C)     TempSrc:  Oral     SpO2: 95% 94% 96% 96%   Weight:  75 kg (165 lb 4.8 oz)     Height:  1.626 m (5' 4\")        "

## 2021-03-05 NOTE — PROGRESS NOTES
Physician Attestation   I, Carlo Wheatley, was present with the medical/ANDREAS student who participated in the service and in the documentation of the note.  I have verified the history and personally performed the physical exam and medical decision making.  I agree with the assessment and plan of care as documented in the note.      I personally reviewed vital signs, medications, labs and imaging.    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        Plan for Today:   - Continue pain regimen per Care Plan: PCA w/ Morphine 1mg bolus w/ Q20min lockout, 1mg basal with continuous pulse ox; will adjust PCA dosing or frequency prn  - Heme consulted, recs appreciated  - No planned transfusions at present  - Holding Jadenu for now d/t pt w/o PTA supply and difficult to obtain inpatient, pending ferritin re-check    Sickle cell pain crisis  Hx iron overload   Presented with 1 day worsening generalized pain, unable to be managed on PO regimen at home.  Hgb 8.3, %retic 14.9, absolute retic 443.7. Mild leukocytosis 13.3 per below. COVID neg. On chart review, has had two ED visits for pain in the last 1.5 weeks.  Last seen in Hematology clinic on 02/24/21 due to more frequent episodes of pain. On Oxycodone 15mg PRN, Celebrex PRN, and APAP PRN at home. Recently stopped OxyContin. Currently afebrile and stable on RA. Last episode of acute chest in 10/2019.        - Hematology consulted, recs appreciated  - Patient care plan for pain management on file   - Pain plan for inpatient management per  Care Plan: Start PCA w/ Morphine 1mg bolus w/ Q20min lockout, 1mg basal; please include continuous pulse ox and capno; will adjust PCA dosing/frequency prn  - Celebrex PRN for additional pain control  - Antiemetics w/ Zofran PRN   - Benadryl PRN   - Bowel regimen w/ docusate and senna   - Continue PTA Hydroxyurea  - Lidoderm patches PRN for back pain  - Daily CBC w/ diff, retics  - Holding Jadenu for now pending ferritin recheck per above  - Low threshold for CXR, EKG, if new fever or hypoxia   - No transfusion unless discussion w/ Heme   - Will avoid Toradol for additional pain control as pt on Rivaroxaban    Elevated LFTs - , , Tbili 1.8 on 3/1.  Possibly r/t hx iron overload.  Last RUQ 04/2020 was normal.    - Repeat CMP in AM   - Low threshold to obtain US abdomen if rising LFTs     Leukocytosis - leukocytosis with WBC to 13.3 (improved from 14.4 from ED visit on 03/01/21), ikely 2/2 stress response from pain crisis given no s/s recent or active infection.   - Consider checking procal, UA, CRP   - Daily CBC per above    PE - V/Q scan on 1/31 initially read as negative, re-read on 2/1 positive for acute PE.  Started on Rivaroxaban at that time.  Currently stable on room air.    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  Recently stopped ASA per Hematology recs.  Has RUE contracture at baseline and cognitive delay.    - Continue to hold ASA while on Rivaroxaban    Asthma - Stable.  Denies dyspnea and cough.   - Continue PTA Symbicort and PRN Albuterol      Depression, anxiety - Patient with non-depressed affect at present.  - Continue PTA Sertraline 200mg daily        Diet: Regular Diet Adult    DVT Prophylaxis: Rivaroxiban (PTA)  Lopez Catheter: not present  Code Status: Full Code           Disposition Plan   Expected discharge: 2 - 3 days, recommended to prior living arrangement  "once adequate pain management/ tolerating PO medications.  Entered: Molly M. Gilligan 03/05/2021, 9:46 AM       The patient's care was discussed with the Attending Physician, Dr. Wheatley.    Molly M. Gilligan  Hospitalist Service, [address]  Contact information available via [facility] Paging/Directory  Please see sign in/sign out for up to date coverage information  ______________________________________________________________________    Interval History   Pt is having significant pain this a.m., reports is 10/10 while on continuous morphine, PCA morphine. She denies any new CP, SOB, fever. Says current pain is \"all over\" but especially bad in the chest area.     Data reviewed today: I reviewed all medications, new labs and imaging results over the last 24 hours.    Physical Exam   Vital Signs: Temp: 98.1  F (36.7  C) Temp src: Oral BP: 127/73 Pulse: 87   Resp: 18 SpO2: 98 % O2 Device: None (Room air)    Weight: 165 lbs 4.8 oz  General Appearance: Soft-spoken and grimaces with movement, not in acute distress  Respiratory: CTAB  Cardiovascular: RRR, no m/r/g  GI: Abd soft, non-distended, non-tender  Skin: No bruising, lesions noted  Neuro/Psych: Alert and oriented, answers questions appropriately, non-depressed affect     Data   Recent Labs   Lab 03/05/21  0743 03/04/21  1449 03/01/21  2043 02/28/21  0059   WBC 11.2* 13.3* 14.4* 13.2*   HGB 7.4* 8.3* 7.6* 7.7*   MCV 84 84 83 86   * 578* 654* 676*    136 136 140   POTASSIUM 3.6 3.6 3.7 3.7   CHLORIDE 110* 109 105 109   CO2 22 24 26 25   BUN 8 10 12 13   CR 0.51* 0.50* 0.54 0.56   ANIONGAP 5 4 5 5   MICAH 8.6 8.9 9.0 8.6   GLC 89 93 98 93   ALBUMIN 3.3*  --  3.8  --    PROTTOTAL 7.7  --  9.1*  --    BILITOTAL 2.0*  --  1.8*  --    ALKPHOS 65  --  78  --    *  --  135*  --    *  --  120*  --    TROPI  --   --   --  <0.015     No results found for this or any previous visit (from the past 24 " hour(s)).  Medications     morphine       - MEDICATION INSTRUCTIONS -       sodium chloride 10 mL/hr at 03/05/21 0548       deferasirox  1,440 mg Oral QPM     docusate sodium  100 mg Oral BID     fluticasone-vilanterol  1 puff Inhalation Daily     hydroxyurea  2,000 mg Oral QPM     lidocaine   Transdermal Q8H     rivaroxaban ANTICOAGULANT  20 mg Oral Daily with supper     senna-docusate  1 tablet Oral BID    Or     senna-docusate  2 tablet Oral BID     sertraline  200 mg Oral Daily

## 2021-03-05 NOTE — PLAN OF CARE
Admitted/transferred from: ED  2 RN skin assessment completed by Kimi Mcarthur, JOE and Uyen PITTMAN RN.   Skin assessment finding: Left chest port, otherwise no skin issues.   Interventions/actions: Will continue to monitor.

## 2021-03-05 NOTE — H&P
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       # Sickle cell pain crisis  # Hx iron overload   Presented with 1 day worsening generalized pain, unable to be managed on PO regimen at home.  Hgb 8.3, %retic 14.9, Absolute retic 443.7.  Mild leukocytosis 13.3.  COVID neg.  On chart review, has had two ED visits for pain in the last 1.5 weeks.  Last seen in Hematology clinic 2/24/21 due to more frequent episodes of pain.  On Oxycodone 15mg PRN, Celebrex PRN, and APAP PRN at home.  Recently stopped OxyContin.  Currently afebrile and stable on RA.  Last episode of acute chest in 10/2019.        - Hematology consult      - Pain plan for inpatient management: Start PCA w/ Morphine 1mg bolus w/ Q20min lockout, 1mg basal; please include continuous pulse ox and capno   - Celebrex PRN   - Antiemetics w/ Zofran PRN   - Benadryl PRN   - Bowel regimen w/ docusate and senna   - Continue PTA Hydroxyurea and Jadenu   - Lidoderm patches PRN for back pain  - Trend CBC w/ diff, retic ct daily   - Low threshold for CXR, EKG, if new fever or hypoxia   - No transfusion unless discussion w/ Heme   - Patient care plan for pain management on file     # Elevated LFTs - , , Tbili 1.8 on 3/1.  Possibly r/t hx iron overload.  Last RUQ 4/2020 was normal.    - Repeat CMP in AM   - Consider US abdomen if rising LFTs     # Leukocytosis - Possibly 2/2 stress response from pain crisis.  No focal complaints.  WBC 13.3 (14.4), improved from last check during ED visit on 3/1.  - Check procal, UA, CRP   - Repeat CBC in AM        # PE - V/Q scan on 1/31 initially read as  "negative, re-read on 2/1 positive for acute PE.  Started on Rivaroxaban at that time.  Currently stable on room air.    - Continue Rivaroxaban 20mg every evening     # Hx CVA (left MCA w/ R hemiparesis and RUE contracture) - Per chart review, had a stroke at age 1 and TIA in 2014.  Recently stopped ASA per Hematology recs.  Has RUE contracture at baseline and cognitive delay.    - Continue to hold ASA while on Rivaroxaban    # Asthma - Stable.  Denies dyspnea and cough.   - Continue PTA Symbicort and PRN Albuterol      # Depression, anxiety - Tearful because of pain, but pleasant and interactive.  - Continue PTA Sertraline 200mg daily         Diet:   Regular   DVT Prophylaxis: Rivaroxaban   Lopez Catheter: not present  Code Status:   FULL          Disposition Plan   Expected discharge: 2 - 3 days, recommended to prior living arrangement once sickle cell crisis resolved and pain controlled with oral medications..  Entered: EITAN Lisa CNP 03/04/2021, 6:58 PM     The patient's care was discussed with the Attending Physician, Dr. Alhaji Winters.    EITAN Lisa CNP  [facility]  Contact information available via [facility] Paging/Directory  Please see sign in/sign out for up to date coverage information    ______________________________________________________________________    Chief Complaint   \"I hurt all over, and it's getting worse\"     History is obtained from the patient and chart review.    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       Jennifer is resting in bed.  She reports worsening generalized pain for the last 24 hours.  She says that she hurts \"all over\" and can't really localize any area of her body that hurts the most, " though chart review indicates she has had neck pain and back pain.  She denies chest pain, dyspnea, cough, nausea, vomiting, diarrhea, constipation, dysuria, and fevers.  She denies any known triggers for her pain crises.  Denies bleeding issues w/ Rivaroxaban.  Eating and drinking well.  Agreeable to pain management w/ PCA.      Review of Systems    The 10 point Review of Systems is negative other than noted in the HPI or here.     Past Medical History    I have reviewed this patient's medical history and updated it with pertinent information if needed.   Past Medical History:   Diagnosis Date     Anemia      Anxiety      Bleeding disorder (H)      Blood clotting disorder (H)      Cerebral infarction (H) 2015     Cognitive developmental delay     low IQ. 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 (H)     right hand contractures     Iron overload due to repeated red blood cell transfusions      Migraines      Multiple subsegmental pulmonary emboli without acute cor pulmonale (H) 02/01/2021     Oppositional defiant behavior      Uncomplicated asthma        Past Surgical History   I have reviewed this patient's surgical history and updated it with pertinent information if needed.  Past Surgical History:   Procedure Laterality Date     AS INSERT TUNNELED CV 2 CATH W/O PORT/PUMP       C BREAST REDUCTION (INCLUDES LIPO) TIER 3 Bilateral 04/23/2019     CHOLECYSTECTOMY       IR CVC NON TUNNEL PLACEMENT  04/07/2020     REPAIR TENDON ELBOW Right 10/02/2019    Procedure: Right Elbow Flexor Lengthening, Flexor Pronator Slide Of Wrist and Finger, Thumb Adductor Lengthening;  Surgeon: Anai Franco MD;  Location: [facility] OR     TONSILLECTOMY Bilateral 10/02/2019    Procedure: Bilateral Tonsillectomy;  Surgeon: Farhana Guy MD;  Location: [facility] OR       Social History   I have  reviewed this patient's social history and updated it with pertinent information if needed.  Social History     Tobacco Use     Smoking status: Never Smoker     Smokeless tobacco: Never Used   Substance Use Topics     Alcohol use: Not Currently     Alcohol/week: 0.0 standard drinks     Drug use: Never       Family History   I have reviewed this patient's family history and updated it with pertinent information if needed.  Family History   Problem Relation Age of Onset     Sickle Cell Trait Mother      Hypertension Mother      Asthma Mother      Sickle Cell Trait Father        Prior to Admission Medications   Prior to Admission Medications   Prescriptions Last Dose Informant Patient Reported? Taking?   EPINEPHrine (ANY BX GENERIC EQUIV) 0.3 MG/0.3ML injection 2-pack   No No   Sig: Inject 0.3 mLs (0.3 mg) into the muscle as needed for anaphylaxis   Hydroxyurea 1000 MG TABS   No No   Sig: Take 2,000 mg by mouth daily   Patient taking differently: Take 2,000 mg by mouth every evening    JADENU 360 MG tablet   No No   Sig: Take 4 tablets (1,440 mg) by mouth daily   Patient taking differently: Take 1,440 mg by mouth every evening    acetaminophen (TYLENOL) 325 MG tablet   No No   Sig: Take 2 tablets (650 mg) by mouth every 6 hours as needed for mild pain   Patient taking differently: Take 325-650 mg by mouth every 6 hours as needed for mild pain    albuterol (PROAIR HFA/PROVENTIL HFA/VENTOLIN HFA) 108 (90 Base) MCG/ACT inhaler   No No   Sig: Inhale 2 puffs into the lungs every 6 hours as needed for shortness of breath / dyspnea or wheezing   albuterol (PROVENTIL) (2.5 MG/3ML) 0.083% neb solution   No No   Sig: Take 1 vial (2.5 mg) by nebulization every 6 hours as needed for shortness of breath / dyspnea or wheezing   aspirin (ASPIRIN) 81 MG EC tablet   Yes No   Sig: Take 1 tablet (81 mg) by mouth daily . HOLD while on Xarelto   budesonide-formoterol (SYMBICORT) 160-4.5 MCG/ACT Inhaler   No No   Sig: Inhale 2 puffs into  the lungs 2 times daily   celecoxib (CELEBREX) 100 MG capsule   No No   Sig: Take 1 capsule (100 mg) by mouth 2 times daily   diphenhydrAMINE (BENADRYL) 25 MG capsule   No No   Sig: Take 1-2 capsules (25-50 mg) by mouth nightly as needed for sleep   medroxyPROGESTERone (DEPO-PROVERA) 150 MG/ML IM injection   Yes No   Sig: Inject 150 mg into the muscle   naloxone (NARCAN) 4 MG/0.1ML nasal spray   No No   Sig: Spray 1 spray (4 mg) into one nostril alternating nostrils once as needed for opioid reversal every 2-3 minutes until assistance arrives   ondansetron (ZOFRAN) 8 MG tablet   Yes No   oxyCODONE IR (ROXICODONE) 15 MG tablet   No No   Sig: Take 1 tablet (15 mg) by mouth every 6 hours as needed for severe pain   rivaroxaban ANTICOAGULANT (XARELTO) 20 MG TABS tablet   No No   Sig: Take 1 tablet (20 mg) by mouth daily (with dinner)   sertraline (ZOLOFT) 100 MG tablet   No No   Sig: Take 2 tablets (200 mg) by mouth daily      Facility-Administered Medications: None     Allergies   Allergies   Allergen Reactions     Fish-Derived Products Hives     Seafood Hives     Contrast Dye      Diagnostic X-Ray Materials      Gadolinium        Physical Exam   Vital Signs: Temp: 98  F (36.7  C) Temp src: Oral BP: 113/60 Pulse: 99   Resp: 12 SpO2: 98 % O2 Device: None (Room air)    Weight: 165 lbs 0 oz    GENERAL: Alert and oriented x 3. Well nourished, well developed.  No acute distress.    HEENT: Normocephalic, atraumatic. Anicteric sclera. Mucous membranes moist.   CV: RRR. S1, S2. No murmurs appreciated.   RESPIRATORY: Effort normal on room air. Lungs CTAB with no wheezing, rales, or rhonchi.   GI: Abdomen soft and non distended, bowel sounds present x all 4 quadrants. No tenderness, rebound, or guarding.   NEUROLOGICAL: No focal deficits. Follows commands.  Strength equal in upper and lower extremities.   MUSCULOSKELETAL: No joint swelling or tenderness. Moves all extremities.   EXTREMITIES: Chronic contracture/shortening of  RUE. No peripheral edema.   SKIN: Grossly warm, dry, and intact. No jaundice. No rashes.       Data   Data reviewed today: I reviewed all medications, new labs and imaging results over the last 24 hours.     Recent Labs   Lab 03/04/21 1449 03/01/21 2043 02/28/21  0059 02/26/21  1231   WBC 13.3* 14.4* 13.2* 12.3*   HGB 8.3* 7.6* 7.7* 7.9*   MCV 84 83 86 82   * 654* 676* 668*    136 140 139   POTASSIUM 3.6 3.7 3.7 3.6   CHLORIDE 109 105 109 111*   CO2 24 26 25 23   BUN 10 12 13 8   CR 0.50* 0.54 0.56 0.53   ANIONGAP 4 5 5 5   MICAH 8.9 9.0 8.6 8.7   GLC 93 98 93 88   ALBUMIN  --  3.8  --  3.5   PROTTOTAL  --  9.1*  --  8.7   BILITOTAL  --  1.8*  --  1.3   ALKPHOS  --  78  --  74   ALT  --  135*  --  87*   AST  --  120*  --  91*   TROPI  --   --  <0.015  --      Most Recent 3 CBC's:  Recent Labs   Lab Test 03/04/21 1449 03/01/21 2043 02/28/21  0059   WBC 13.3* 14.4* 13.2*   HGB 8.3* 7.6* 7.7*   MCV 84 83 86   * 654* 676*     Most Recent 3 BMP's:  Recent Labs   Lab Test 03/04/21 1449 03/01/21 2043 02/28/21  0059    136 140   POTASSIUM 3.6 3.7 3.7   CHLORIDE 109 105 109   CO2 24 26 25   BUN 10 12 13   CR 0.50* 0.54 0.56   ANIONGAP 4 5 5   MICAH 8.9 9.0 8.6   GLC 93 98 93     Most Recent 2 LFT's:  Recent Labs   Lab Test 03/01/21 2043 02/26/21  1231   * 91*   * 87*   ALKPHOS 78 74   BILITOTAL 1.8* 1.3     Most Recent 3 INR's:  Recent Labs   Lab Test 02/21/21 2055 02/01/21 2200 04/08/20  0641   INR 1.43* 1.26* 1.31*     No results found for this or any previous visit (from the past 24 hour(s)).

## 2021-03-05 NOTE — CONSULTS
"  Hematology Consult Note   Date of Service: 03/05/2021    Patient: Jennifer Cervantes  MRN: [id number]  Admission Date: 3/4/2021  Hospital Day # 1   Primary Outpatient Hematologist: Perla    Reason for Consult: Sickle Cell Pain crisis      History of Present Illness:    Jennifer Cervantes is a 21 year old woman with a history of Sickle Cell Disease (HgbSS) c/b stroke (age 1) with residual RUE weakness and TIA 2014, acute chest (2019), iron overload due to transfusions as secondary prevention for stroke (on Jadenu), and PE (dx 2/1/21) on Xarelto, who presents with pain crisis.    Jennifer unfortunately has been having worsening pain recently despite increasing home oxycodone in February. Yes[birth year] was her birthday, but over the day prior in to yesterday she began having significantly worsening pain despite home medications. She was not able to get an appointment in our infusion center for fluids/IV pain medications, so she presented to the ED. In the ED, her oxygen saturation was normal on room air, with no chest discomfort.     Overnight, she was given 4mg IV morphine, 1mg IV dilaudid, and then started on a morphine PCA. Her pain today is not terribly well controlled. It is \"all over\" pain, but perhaps more prominent in her low back. She does not think there was anything that triggered this crisis in particular.  Denies chest pain or trouble breathing.    Her labs today show Tbili of 2, , and . Hgb is 7.4, with retics of 462.    Review of Systems: Pertinent positive and negative systems described in HPI; the remainder of the 14 systems are negative    Past Medical History:  Past Medical History:   Diagnosis Date     Anemia      Anxiety      Bleeding disorder (H)      Blood clotting disorder (H)      Cerebral infarction (H) 2015     Cognitive developmental delay     low IQ. Please recognize when managing pain and planning with her     Depressive disorder      Hemiplegia and hemiparesis following cerebral " infarction affecting right dominant side (H)     right hand contractures     Iron overload due to repeated red blood cell transfusions      Migraines      Multiple subsegmental pulmonary emboli without acute cor pulmonale (H) 02/01/2021     Oppositional defiant behavior      Uncomplicated asthma        Past Surgical History:  Past Surgical History:   Procedure Laterality Date     AS INSERT TUNNELED CV 2 CATH W/O PORT/PUMP       C BREAST REDUCTION (INCLUDES LIPO) TIER 3 Bilateral 04/23/2019     CHOLECYSTECTOMY       IR CVC NON TUNNEL PLACEMENT  04/07/2020     REPAIR TENDON ELBOW Right 10/02/2019    Procedure: Right Elbow Flexor Lengthening, Flexor Pronator Slide Of Wrist and Finger, Thumb Adductor Lengthening;  Surgeon: Anai Franco MD;  Location: [facility] OR     TONSILLECTOMY Bilateral 10/02/2019    Procedure: Bilateral Tonsillectomy;  Surgeon: Farhana Guy MD;  Location: [facility] OR       Social History:  Social History     Socioeconomic History     Marital status: Single     Spouse name: None     Number of children: None     Years of education: None     Highest education level: None   Occupational History     None   Social Needs     Financial resource strain: None     Food insecurity     Worry: None     Inability: None     Transportation needs     Medical: None     Non-medical: None   Tobacco Use     Smoking status: Never Smoker     Smokeless tobacco: Never Used   Substance and Sexual Activity     Alcohol use: Not Currently     Alcohol/week: 0.0 standard drinks     Drug use: Never     Sexual activity: Not Currently     Partners: Male     Birth control/protection: Other   Lifestyle     Physical activity     Days per week: None     Minutes per session: None     Stress: None   Relationships     Social connections     Talks on phone: None     Gets together: None     Attends Anabaptism service: None     Active member of club or organization: None     Attends meetings of clubs or organizations: None      "Relationship status: None     Intimate partner violence     Fear of current or ex partner: None     Emotionally abused: None     Physically abused: None     Forced sexual activity: None   Other Topics Concern     Parent/sibling w/ CABG, MI or angioplasty before 65F 55M? Not Asked   Social History Narrative    Lives with mom and extended family but \"toxic environment\" per her report. She would like to move out but cannot financially do so. She has minimal support at home despite her significant SCD comorbidities and cognitive delay from stroke.        Family History  Family History   Problem Relation Age of Onset     Sickle Cell Trait Mother      Hypertension Mother      Asthma Mother      Sickle Cell Trait Father        Outpatient Medications:  No current facility-administered medications on file prior to encounter.   acetaminophen (TYLENOL) 325 MG tablet, Take 2 tablets (650 mg) by mouth every 6 hours as needed for mild pain (Patient taking differently: Take 325-650 mg by mouth every 6 hours as needed for mild pain )  albuterol (PROAIR HFA/PROVENTIL HFA/VENTOLIN HFA) 108 (90 Base) MCG/ACT inhaler, Inhale 2 puffs into the lungs every 6 hours as needed for shortness of breath / dyspnea or wheezing  albuterol (PROVENTIL) (2.5 MG/3ML) 0.083% neb solution, Take 1 vial (2.5 mg) by nebulization every 6 hours as needed for shortness of breath / dyspnea or wheezing  aspirin (ASPIRIN) 81 MG EC tablet, Take 1 tablet (81 mg) by mouth daily . HOLD while on Xarelto  budesonide-formoterol (SYMBICORT) 160-4.5 MCG/ACT Inhaler, Inhale 2 puffs into the lungs 2 times daily  celecoxib (CELEBREX) 100 MG capsule, Take 1 capsule (100 mg) by mouth 2 times daily  diphenhydrAMINE (BENADRYL) 25 MG capsule, Take 1-2 capsules (25-50 mg) by mouth nightly as needed for sleep  EPINEPHrine (ANY BX GENERIC EQUIV) 0.3 MG/0.3ML injection 2-pack, Inject 0.3 mLs (0.3 mg) into the muscle as needed for anaphylaxis  Hydroxyurea 1000 MG TABS, Take 2,000 mg " "by mouth daily (Patient taking differently: Take 2,000 mg by mouth every evening )  JADENU 360 MG tablet, Take 4 tablets (1,440 mg) by mouth daily (Patient taking differently: Take 1,440 mg by mouth every evening )  medroxyPROGESTERone (DEPO-PROVERA) 150 MG/ML IM injection, Inject 150 mg into the muscle  naloxone (NARCAN) 4 MG/0.1ML nasal spray, Spray 1 spray (4 mg) into one nostril alternating nostrils once as needed for opioid reversal every 2-3 minutes until assistance arrives  ondansetron (ZOFRAN) 8 MG tablet,   oxyCODONE IR (ROXICODONE) 15 MG tablet, Take 1 tablet (15 mg) by mouth every 6 hours as needed for severe pain  rivaroxaban ANTICOAGULANT (XARELTO) 20 MG TABS tablet, Take 1 tablet (20 mg) by mouth daily (with dinner)  sertraline (ZOLOFT) 100 MG tablet, Take 2 tablets (200 mg) by mouth daily         Physical Exam:    /73 (BP Location: Right arm)   Pulse 87   Temp 98.1  F (36.7  C) (Oral)   Resp 18   Ht 1.626 m (5' 4\")   Wt 75 kg (165 lb 4.8 oz)   SpO2 98%   BMI 28.37 kg/m    Gen: Mildly uncomfortable, sitting up in bed  HEENT: EOMI, PERRL  CV: Normal rate, regular  Pulm: Breathing comfortably on room air, no adventitious lung sounds  Abd: Soft, nt/nd, no rebound/guarding  Ext: Warm and well perfused. No lower extremity edema  Skin: No rash, cyanosis or petechial lesion  Neuro: Alert and answering questions appropriately. R hand contracture.    Labs & Studies: I personally reviewed the following studies:  ROUTINE LABS (Last four results):  CMP  Recent Labs   Lab 03/05/21  0743 03/04/21  1449 03/01/21  2043 02/28/21  0059 02/26/21  1231    136 136 140 139   POTASSIUM 3.6 3.6 3.7 3.7 3.6   CHLORIDE 110* 109 105 109 111*   CO2 22 24 26 25 23   ANIONGAP 5 4 5 5 5   GLC 89 93 98 93 88   BUN 8 10 12 13 8   CR 0.51* 0.50* 0.54 0.56 0.53   GFRESTIMATED >90 >90 >90 >90 >90   GFRESTBLACK >90 >90 >90 >90 >90   MICAH 8.6 8.9 9.0 8.6 8.7   PROTTOTAL 7.7  --  9.1*  --  8.7   ALBUMIN 3.3*  --  3.8  --  " 3.5   BILITOTAL 2.0*  --  1.8*  --  1.3   ALKPHOS 65  --  78  --  74   *  --  120*  --  91*   *  --  135*  --  87*     CBC  Recent Labs   Lab 03/05/21  0743 03/04/21  1449 03/01/21  2043 02/28/21  0059   WBC 11.2* 13.3* 14.4* 13.2*   RBC 2.66* 2.97* 2.80* 2.81*   HGB 7.4* 8.3* 7.6* 7.7*   HCT 22.3* 25.0* 23.3* 24.1*   MCV 84 84 83 86   MCH 27.8 27.9 27.1 27.4   MCHC 33.2 33.2 32.6 32.0   RDW 25.7* 25.4* 24.5* 25.1*   * 578* 654* 676*       Assessment & Plan:   Jennifer Cervantes is a 21 year old woman with a history of Sickle Cell Disease (HgbSS) c/b stroke (age 1) with residual RUE weakness and TIA 2014, acute chest (2019), iron overload due to transfusions as secondary prevention for stroke (on Jadenu), and PE (dx 2/1/21) on Xarelto, who presents with what appears to be an uncomplicated pain crisis.    #Sickle Cell pain crisis  #Recent PE  Jennifer presents with worsening pain despite home medications. Hgb of 7.4 today (baseline 6-8) with appropriate retic response of 462.  Tbili of 2, with  and  and within previous ranges for crises. Tbili in particular has been higher with pain crises. Afebrile with no hypoxia or respiratory distress concerning for acute chest syndrome. Morphine PCA initiated overnight with modest pain control; would encourage aggressive titration.    Overall, appears to be an uncomplicated pain crisis at this moment. Would consider RUQ US if developed RUQ pain or rapid rise in LFTs out of proportion to pain.    #Iron Overload  Iron overload due to chronic transfusions as secondary prevention for stroke. Ferritin >8400 on admission. Case discussed with Dr. Duncan. Since she is out of [facility], and he was planning for IV chelation therapy as an outpatient anyway, we will initiate Desferal while inpatient to help with iron chelation.    Recommendations:  - Continue aggressive pain control with PCA  - Would add Tylenol and continue topical agents for adjuntive therapy  -  Okay for home Celebrex, hold other NSAIDs while on Xarelto   - Please ensure adequate bowel regimen    - Encourage incentive spirometry  - Continue with home Xarelto  - Start Desferal 3g daily for 3 days (ordered for you)  - Add folate 1g/day (ordered for you)  - Continue PTA Hydrea  - Continue with fluids as needed, pending PO intake  - Please obtain CBC with diff and retics daily  - Do NOT transfuse blood unless discussed with Hematology first  - Please page hematology if patient develops fever, acute shortness of breath or other symptoms of acute chest syndrome      Patient was seen and plan of care was discussed with attending physician Dr. Hernandez.    We will continue to follow this patient. Please don't hesitate to contact the Fellow On-Call with questions.    Isaac Longoria MD PhD  Heme/Onc/Transplant Fellow  Pgr [phone]        HEMATOLOGY STAFF:  Seen with fellow, whose note reflects our joint evaluation, assessment, and plan.  Case also discussed with primary hematologist, Dr. Dunacn.      Jose Manuel Hernandez MD  Associate Professor of Medicine  Division of Hematology, Oncology, and Transplantation  Director, [facility]

## 2021-03-06 ENCOUNTER — APPOINTMENT (OUTPATIENT)
Dept: GENERAL RADIOLOGY | Facility: CLINIC | Age: 21
DRG: 811 | End: 2021-03-06
Attending: INTERNAL MEDICINE
Payer: COMMERCIAL

## 2021-03-06 LAB
ALBUMIN SERPL-MCNC: 3.5 G/DL (ref 3.4–5)
ALP SERPL-CCNC: 73 U/L (ref 40–150)
ALT SERPL W P-5'-P-CCNC: 149 U/L (ref 0–50)
ANION GAP SERPL CALCULATED.3IONS-SCNC: 4 MMOL/L (ref 3–14)
AST SERPL W P-5'-P-CCNC: 124 U/L (ref 0–45)
BASOPHILS # BLD AUTO: 0.2 10E9/L (ref 0–0.2)
BASOPHILS NFR BLD AUTO: 1.4 %
BILIRUB SERPL-MCNC: 3 MG/DL (ref 0.2–1.3)
BUN SERPL-MCNC: 8 MG/DL (ref 7–30)
CALCIUM SERPL-MCNC: 8.6 MG/DL (ref 8.5–10.1)
CHLORIDE SERPL-SCNC: 110 MMOL/L (ref 94–109)
CO2 SERPL-SCNC: 24 MMOL/L (ref 20–32)
CREAT SERPL-MCNC: 0.51 MG/DL (ref 0.52–1.04)
DIFFERENTIAL METHOD BLD: ABNORMAL
EOSINOPHIL # BLD AUTO: 1 10E9/L (ref 0–0.7)
EOSINOPHIL NFR BLD AUTO: 8.5 %
ERYTHROCYTE [DISTWIDTH] IN BLOOD BY AUTOMATED COUNT: 26.2 % (ref 10–15)
GFR SERPL CREATININE-BSD FRML MDRD: >90 ML/MIN/{1.73_M2}
GLUCOSE SERPL-MCNC: 87 MG/DL (ref 70–99)
HCT VFR BLD AUTO: 21.2 % (ref 35–47)
HGB BLD-MCNC: 7.1 G/DL (ref 11.7–15.7)
IMM GRANULOCYTES # BLD: 0.1 10E9/L (ref 0–0.4)
IMM GRANULOCYTES NFR BLD: 0.5 %
LYMPHOCYTES # BLD AUTO: 2.3 10E9/L (ref 0.8–5.3)
LYMPHOCYTES NFR BLD AUTO: 19.8 %
MCH RBC QN AUTO: 28 PG (ref 26.5–33)
MCHC RBC AUTO-ENTMCNC: 33.5 G/DL (ref 31.5–36.5)
MCV RBC AUTO: 84 FL (ref 78–100)
MONOCYTES # BLD AUTO: 0.8 10E9/L (ref 0–1.3)
MONOCYTES NFR BLD AUTO: 7 %
NEUTROPHILS # BLD AUTO: 7.2 10E9/L (ref 1.6–8.3)
NEUTROPHILS NFR BLD AUTO: 62.8 %
NRBC # BLD AUTO: 0.2 10*3/UL
NRBC BLD AUTO-RTO: 2 /100
PLATELET # BLD AUTO: 510 10E9/L (ref 150–450)
POTASSIUM SERPL-SCNC: 4 MMOL/L (ref 3.4–5.3)
PROT SERPL-MCNC: 8 G/DL (ref 6.8–8.8)
RBC # BLD AUTO: 2.54 10E12/L (ref 3.8–5.2)
RETICS # AUTO: 618 10E9/L (ref 25–95)
RETICS/RBC NFR AUTO: 24.3 % (ref 0.5–2)
SODIUM SERPL-SCNC: 139 MMOL/L (ref 133–144)
WBC # BLD AUTO: 11.5 10E9/L (ref 4–11)

## 2021-03-06 PROCEDURE — 36592 COLLECT BLOOD FROM PICC: CPT | Performed by: NURSE PRACTITIONER

## 2021-03-06 PROCEDURE — 120N000002 HC R&B MED SURG/OB UMMC

## 2021-03-06 PROCEDURE — 99233 SBSQ HOSP IP/OBS HIGH 50: CPT | Performed by: INTERNAL MEDICINE

## 2021-03-06 PROCEDURE — 250N000011 HC RX IP 250 OP 636: Performed by: NURSE PRACTITIONER

## 2021-03-06 PROCEDURE — 250N000011 HC RX IP 250 OP 636: Performed by: PEDIATRICS

## 2021-03-06 PROCEDURE — 85025 COMPLETE CBC W/AUTO DIFF WBC: CPT | Performed by: NURSE PRACTITIONER

## 2021-03-06 PROCEDURE — 258N000003 HC RX IP 258 OP 636: Performed by: PEDIATRICS

## 2021-03-06 PROCEDURE — 250N000013 HC RX MED GY IP 250 OP 250 PS 637: Performed by: STUDENT IN AN ORGANIZED HEALTH CARE EDUCATION/TRAINING PROGRAM

## 2021-03-06 PROCEDURE — 80053 COMPREHEN METABOLIC PANEL: CPT | Performed by: NURSE PRACTITIONER

## 2021-03-06 PROCEDURE — 85045 AUTOMATED RETICULOCYTE COUNT: CPT | Performed by: NURSE PRACTITIONER

## 2021-03-06 PROCEDURE — 250N000011 HC RX IP 250 OP 636: Performed by: EMERGENCY MEDICINE

## 2021-03-06 PROCEDURE — 71045 X-RAY EXAM CHEST 1 VIEW: CPT | Mod: 26 | Performed by: RADIOLOGY

## 2021-03-06 PROCEDURE — 250N000013 HC RX MED GY IP 250 OP 250 PS 637: Performed by: NURSE PRACTITIONER

## 2021-03-06 PROCEDURE — 71045 X-RAY EXAM CHEST 1 VIEW: CPT

## 2021-03-06 RX ADMIN — MORPHINE SULFATE 2 MG: 2 INJECTION, SOLUTION INTRAMUSCULAR; INTRAVENOUS at 19:29

## 2021-03-06 RX ADMIN — FOLIC ACID 1 MG: 1 TABLET ORAL at 08:52

## 2021-03-06 RX ADMIN — CELECOXIB 100 MG: 100 CAPSULE ORAL at 21:13

## 2021-03-06 RX ADMIN — Medication: at 16:26

## 2021-03-06 RX ADMIN — DEFEROXAMINE MESYLATE 3000 MG: 95 INJECTION, POWDER, LYOPHILIZED, FOR SOLUTION INTRAMUSCULAR; INTRAVENOUS; SUBCUTANEOUS at 23:50

## 2021-03-06 RX ADMIN — FLUTICASONE FUROATE AND VILANTEROL TRIFENATATE 1 PUFF: 200; 25 POWDER RESPIRATORY (INHALATION) at 08:52

## 2021-03-06 RX ADMIN — CELECOXIB 100 MG: 100 CAPSULE ORAL at 08:52

## 2021-03-06 RX ADMIN — SERTRALINE HYDROCHLORIDE 200 MG: 100 TABLET ORAL at 08:52

## 2021-03-06 RX ADMIN — HYDROXYUREA 2000 MG: 500 CAPSULE ORAL at 21:13

## 2021-03-06 RX ADMIN — HYDROMORPHONE HYDROCHLORIDE 0.5 MG: 1 INJECTION, SOLUTION INTRAMUSCULAR; INTRAVENOUS; SUBCUTANEOUS at 04:35

## 2021-03-06 RX ADMIN — RIVAROXABAN 20 MG: 20 TABLET, FILM COATED ORAL at 18:16

## 2021-03-06 ASSESSMENT — ACTIVITIES OF DAILY LIVING (ADL)
ADLS_ACUITY_SCORE: 11

## 2021-03-06 NOTE — PROGRESS NOTES
Brief Medicine Note   5 March 2021     Contacted by RN regarding worsening pain.  Pt had recently been seen by Livier in the afternoon, who also paged w/ recs for increasing PCA dose.  D/w Livier, will increase bolus to 1.25mg per hour w/ continued 20min lockout.  No changes to basal rate.  Reassess in AM.       Zuri Arrieta, CNP, APRN  Internal Medicine [facility] Hospitalist  [facility]  Pager [phone]

## 2021-03-06 NOTE — PLAN OF CARE
O2 sats 89-92%, intermittently required oxygen via NC. On continuous pulse ox. Otherwise AVSS. A+Ox4. Pain controlled with PCA pump (morphine), scheduled celebex, & continuous aqua heat pad. Ambulating SBA in room/hallways, utilizing bedside commode. Tolerating regular diet w/o nausea or vomiting. BS active. Voiding spontaneously in adequate amts. Bloody nose this afternoon, MD aware. Will continue with POC.

## 2021-03-06 NOTE — PLAN OF CARE
"/61 (BP Location: Left arm)   Pulse 100   Temp 98  F (36.7  C) (Oral)   Resp 16   Ht 1.626 m (5' 4\")   Wt 75 kg (165 lb 4.8 oz)   SpO2 91%   BMI 28.37 kg/m    Afebrile, VSS on RA. Pt on Morphine PCA with a continuous rate of 1mg/hr with 1.3mg bumps, q20min lockout and 6mg hr limit. From midnight to 0600 pt attempted 23 bumps with 5 given. Gave Iv dilaudid x1 for pt c/o of break through pain not managed with current PCA settings. PIV placed in rt forearm d/t Iv compatibility of morphine and desferal. No stool overnight with good urine output. IND in room up to bedside commode, calling appropriately. Continue with plan of care;.  Problem: Adult Inpatient Plan of Care  Goal: Plan of Care Review  Outcome: No Change  Flowsheets (Taken 3/6/2021 0318)  Plan of Care Reviewed With: patient  Progress: no change     Problem: Adult Inpatient Plan of Care  Goal: Absence of Hospital-Acquired Illness or Injury  Intervention: Identify and Manage Fall Risk  Recent Flowsheet Documentation  Taken 3/5/2021 2000 by Qi Skinner, RN  Safety Promotion/Fall Prevention:    activity supervised    clutter free environment maintained    fall prevention program maintained    lighting adjusted    nonskid shoes/slippers when out of bed    patient and family education    safety round/check completed     Problem: Adult Inpatient Plan of Care  Goal: Absence of Hospital-Acquired Illness or Injury  Intervention: Prevent Skin Injury  Recent Flowsheet Documentation  Taken 3/5/2021 2000 by Qi Skinner, RN  Body Position: position changed independently     "

## 2021-03-06 NOTE — PLAN OF CARE
Assumed care of patient from 3114-6600.  Per patient pain not as controlled as she'd like. Primary source of pain is in lower back.  New orders just received for PCA adjustment and celebrex.  Ambulating in cardona with SBA. Poor appetite but drinking fluids well.

## 2021-03-06 NOTE — PROGRESS NOTES
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        Plan for Today:   - Continue pain regimen per Care Plan: PCA increased overnight  - CXR due to increased O2.      Sickle cell pain crisis  Hx iron overload   Presented with 1 day worsening generalized pain, unable to be managed on PO regimen at home.  Hgb 8.3, %retic 14.9, absolute retic 443.7. Mild leukocytosis 13.3 per below. COVID neg. On chart review, has had two ED visits for pain in the last 1.5 weeks.  Last seen in Hematology clinic on 02/24/21 due to more frequent episodes of pain. On Oxycodone 15mg PRN, Celebrex PRN, and APAP PRN at home. Recently stopped OxyContin. Currently afebrile and stable on RA. Last episode of acute chest in 10/2019.        - Hematology consulted, recs appreciated  - Patient care plan for pain management on file   - Pain plan: increased bolus from 1 to 1.25 overnight 3/5.   PCA w/ Morphine 1.25 mg bolus w/ Q20min lockout, 1mg basal; please include continuous pulse ox and capno; will adjust PCA dosing/frequency prn  - Celebrex scheduled for additional pain control  - Antiemetics w/ Zofran PRN   - Benadryl PRN   - Bowel regimen w/ docusate and senna   - Continue PTA Hydroxyurea  - Lidoderm patches PRN for back pain  - Daily CBC w/ diff, retics  - Holding Jadenu for now pending ferritin recheck per above  - Low threshold for CXR, EKG, if new fever or hypoxia   - No transfusion unless discussion w/ Heme   - Will avoid Toradol for additional pain control as pt on Rivaroxaban    Elevated LFTs  Iron Overload:  Chelation per heme, Start Desferal 3g daily for 3  "days    Leukocytosis - leukocytosis with WBC to 13.3 (improved from 14.4 from ED visit on 03/01/21), ikely 2/2 stress response from pain crisis given no s/s recent or active infection.   - Consider checking procal, UA, CRP   - Daily CBC per above    PE - V/Q scan on 1/31 initially read as negative, re-read on 2/1 positive for acute PE.  Started on Rivaroxaban at that time.  Currently stable on room air.    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  Recently stopped ASA per Hematology recs.  Has RUE contracture at baseline and cognitive delay.    - Continue to hold ASA while on Rivaroxaban    Asthma - Stable.  Denies dyspnea and cough.   - Continue PTA Symbicort and PRN Albuterol      Depression, anxiety - Patient with non-depressed affect at present.  - Continue PTA Sertraline 200mg daily        Diet: Regular Diet Adult    DVT Prophylaxis: Rivaroxiban (PTA)  Lopez Catheter: not present  Code Status: Full Code           Disposition Plan   Expected discharge: 2 - 3 days, recommended to prior living arrangement once adequate pain management/ tolerating PO medications.  Entered: Carlo Wheatley MD 03/06/2021, 8:08 AM       The patient's care was discussed with the patient and nurse    Carlo Wheatley MD  Hospitalist Service, [address]  Contact information available via [facility] Paging/Directory  Please see sign in/sign out for up to date coverage information  ______________________________________________________________________    Interval History   Pt is having significant pain this a.m.. also, needing some oxygen.   She denies any new CP, SOB, fever. Says current pain is \"all over\" but especially bad in the chest area.     Data reviewed today: I reviewed all medications, new labs and imaging results over the last 24 hours.    Physical Exam   Vital Signs: " Temp: 97.5  F (36.4  C) Temp src: Oral BP: 132/72 Pulse: 102   Resp: 18 SpO2: 90 % O2 Device: None (Room air)    Weight: 165 lbs 4.8 oz  General Appearance: Soft-spoken and grimaces with movement, not in acute distress  Respiratory: CTAB  Cardiovascular: RRR, no m/r/g  GI: Abd soft, non-distended, non-tender  Skin: No bruising, lesions noted  Neuro/Psych: Alert and oriented, answers questions appropriately, non-depressed affect     Data   Recent Labs   Lab 03/05/21  0743 03/04/21  1449 03/01/21  2043 02/28/21  0059   WBC 11.2* 13.3* 14.4* 13.2*   HGB 7.4* 8.3* 7.6* 7.7*   MCV 84 84 83 86   * 578* 654* 676*    136 136 140   POTASSIUM 3.6 3.6 3.7 3.7   CHLORIDE 110* 109 105 109   CO2 22 24 26 25   BUN 8 10 12 13   CR 0.51* 0.50* 0.54 0.56   ANIONGAP 5 4 5 5   MICAH 8.6 8.9 9.0 8.6   GLC 89 93 98 93   ALBUMIN 3.3*  --  3.8  --    PROTTOTAL 7.7  --  9.1*  --    BILITOTAL 2.0*  --  1.8*  --    ALKPHOS 65  --  78  --    *  --  135*  --    *  --  120*  --    TROPI  --   --   --  <0.015     No results found for this or any previous visit (from the past 24 hour(s)).  Medications     morphine       - MEDICATION INSTRUCTIONS -       sodium chloride 10 mL/hr at 03/05/21 0548       celecoxib  100 mg Oral BID     deferoxamine (DESFERAL) intermittent infusion ADULT  3,000 mg Intravenous Daily at 8 pm     docusate sodium  100 mg Oral BID     fluticasone-vilanterol  1 puff Inhalation Daily     folic acid  1 mg Oral Daily     hydroxyurea  2,000 mg Oral QPM     lidocaine   Transdermal Q8H     rivaroxaban ANTICOAGULANT  20 mg Oral Daily with supper     senna-docusate  1 tablet Oral BID    Or     senna-docusate  2 tablet Oral BID     sertraline  200 mg Oral Daily

## 2021-03-07 LAB
ALBUMIN SERPL-MCNC: 3.5 G/DL (ref 3.4–5)
ALP SERPL-CCNC: 74 U/L (ref 40–150)
ALT SERPL W P-5'-P-CCNC: 153 U/L (ref 0–50)
ANION GAP SERPL CALCULATED.3IONS-SCNC: 5 MMOL/L (ref 3–14)
ANISOCYTOSIS BLD QL SMEAR: ABNORMAL
AST SERPL W P-5'-P-CCNC: 136 U/L (ref 0–45)
BASOPHILS # BLD AUTO: 0.4 10E9/L (ref 0–0.2)
BASOPHILS NFR BLD AUTO: 3.7 %
BILIRUB SERPL-MCNC: 2.8 MG/DL (ref 0.2–1.3)
BUN SERPL-MCNC: 8 MG/DL (ref 7–30)
CALCIUM SERPL-MCNC: 8.6 MG/DL (ref 8.5–10.1)
CHLORIDE SERPL-SCNC: 110 MMOL/L (ref 94–109)
CO2 SERPL-SCNC: 24 MMOL/L (ref 20–32)
CREAT SERPL-MCNC: 0.54 MG/DL (ref 0.52–1.04)
DIFFERENTIAL METHOD BLD: ABNORMAL
ELLIPTOCYTES BLD QL SMEAR: ABNORMAL
EOSINOPHIL # BLD AUTO: 1.3 10E9/L (ref 0–0.7)
EOSINOPHIL NFR BLD AUTO: 12 %
ERYTHROCYTE [DISTWIDTH] IN BLOOD BY AUTOMATED COUNT: 26 % (ref 10–15)
GFR SERPL CREATININE-BSD FRML MDRD: >90 ML/MIN/{1.73_M2}
GLUCOSE SERPL-MCNC: 83 MG/DL (ref 70–99)
HCT VFR BLD AUTO: 21 % (ref 35–47)
HGB BLD-MCNC: 7 G/DL (ref 11.7–15.7)
LYMPHOCYTES # BLD AUTO: 3.2 10E9/L (ref 0.8–5.3)
LYMPHOCYTES NFR BLD AUTO: 30.6 %
MACROCYTES BLD QL SMEAR: PRESENT
MCH RBC QN AUTO: 27.6 PG (ref 26.5–33)
MCHC RBC AUTO-ENTMCNC: 33.3 G/DL (ref 31.5–36.5)
MCV RBC AUTO: 83 FL (ref 78–100)
MICROCYTES BLD QL SMEAR: PRESENT
MONOCYTES # BLD AUTO: 0.1 10E9/L (ref 0–1.3)
MONOCYTES NFR BLD AUTO: 0.9 %
NEUTROPHILS # BLD AUTO: 5.6 10E9/L (ref 1.6–8.3)
NEUTROPHILS NFR BLD AUTO: 52.8 %
NRBC # BLD AUTO: 1.1 10*3/UL
NRBC BLD AUTO-RTO: 10 /100
PLATELET # BLD AUTO: 488 10E9/L (ref 150–450)
PLATELET # BLD EST: ABNORMAL 10*3/UL
POIKILOCYTOSIS BLD QL SMEAR: ABNORMAL
POLYCHROMASIA BLD QL SMEAR: SLIGHT
POTASSIUM SERPL-SCNC: 4 MMOL/L (ref 3.4–5.3)
PROT SERPL-MCNC: 8 G/DL (ref 6.8–8.8)
RBC # BLD AUTO: 2.54 10E12/L (ref 3.8–5.2)
RETICS # AUTO: 634.5 10E9/L (ref 25–95)
RETICS/RBC NFR AUTO: 25 % (ref 0.5–2)
SICKLE CELLS BLD QL SMEAR: ABNORMAL
SODIUM SERPL-SCNC: 138 MMOL/L (ref 133–144)
STOMATOCYTES BLD QL SMEAR: SLIGHT
TARGETS BLD QL SMEAR: ABNORMAL
WBC # BLD AUTO: 10.6 10E9/L (ref 4–11)

## 2021-03-07 PROCEDURE — 250N000013 HC RX MED GY IP 250 OP 250 PS 637: Performed by: NURSE PRACTITIONER

## 2021-03-07 PROCEDURE — 258N000003 HC RX IP 258 OP 636: Performed by: PEDIATRICS

## 2021-03-07 PROCEDURE — 99233 SBSQ HOSP IP/OBS HIGH 50: CPT | Performed by: INTERNAL MEDICINE

## 2021-03-07 PROCEDURE — 85025 COMPLETE CBC W/AUTO DIFF WBC: CPT | Performed by: NURSE PRACTITIONER

## 2021-03-07 PROCEDURE — 85045 AUTOMATED RETICULOCYTE COUNT: CPT | Performed by: NURSE PRACTITIONER

## 2021-03-07 PROCEDURE — 250N000009 HC RX 250: Performed by: INTERNAL MEDICINE

## 2021-03-07 PROCEDURE — 250N000011 HC RX IP 250 OP 636: Performed by: INTERNAL MEDICINE

## 2021-03-07 PROCEDURE — 36592 COLLECT BLOOD FROM PICC: CPT | Performed by: NURSE PRACTITIONER

## 2021-03-07 PROCEDURE — 120N000002 HC R&B MED SURG/OB UMMC

## 2021-03-07 PROCEDURE — 250N000011 HC RX IP 250 OP 636: Performed by: PEDIATRICS

## 2021-03-07 PROCEDURE — 250N000013 HC RX MED GY IP 250 OP 250 PS 637: Performed by: STUDENT IN AN ORGANIZED HEALTH CARE EDUCATION/TRAINING PROGRAM

## 2021-03-07 PROCEDURE — 80053 COMPREHEN METABOLIC PANEL: CPT | Performed by: NURSE PRACTITIONER

## 2021-03-07 RX ORDER — ERYTHROMYCIN 5 MG/G
OINTMENT OPHTHALMIC 4 TIMES DAILY
Status: DISPENSED | OUTPATIENT
Start: 2021-03-07 | End: 2021-03-11

## 2021-03-07 RX ORDER — HYDROMORPHONE HYDROCHLORIDE 1 MG/ML
0.5 INJECTION, SOLUTION INTRAMUSCULAR; INTRAVENOUS; SUBCUTANEOUS ONCE
Status: COMPLETED | OUTPATIENT
Start: 2021-03-07 | End: 2021-03-07

## 2021-03-07 RX ADMIN — HYDROMORPHONE HYDROCHLORIDE 0.5 MG: 1 INJECTION, SOLUTION INTRAMUSCULAR; INTRAVENOUS; SUBCUTANEOUS at 20:34

## 2021-03-07 RX ADMIN — FLUTICASONE FUROATE AND VILANTEROL TRIFENATATE 1 PUFF: 200; 25 POWDER RESPIRATORY (INHALATION) at 11:54

## 2021-03-07 RX ADMIN — RIVAROXABAN 20 MG: 20 TABLET, FILM COATED ORAL at 16:34

## 2021-03-07 RX ADMIN — FOLIC ACID 1 MG: 1 TABLET ORAL at 11:55

## 2021-03-07 RX ADMIN — ERYTHROMYCIN 1 G: 5 OINTMENT OPHTHALMIC at 13:03

## 2021-03-07 RX ADMIN — ERYTHROMYCIN 1 G: 5 OINTMENT OPHTHALMIC at 16:34

## 2021-03-07 RX ADMIN — DEFEROXAMINE MESYLATE 3000 MG: 95 INJECTION, POWDER, LYOPHILIZED, FOR SOLUTION INTRAMUSCULAR; INTRAVENOUS; SUBCUTANEOUS at 20:41

## 2021-03-07 RX ADMIN — ERYTHROMYCIN 1 G: 5 OINTMENT OPHTHALMIC at 20:33

## 2021-03-07 RX ADMIN — SERTRALINE HYDROCHLORIDE 200 MG: 100 TABLET ORAL at 11:54

## 2021-03-07 RX ADMIN — CELECOXIB 100 MG: 100 CAPSULE ORAL at 11:55

## 2021-03-07 RX ADMIN — CELECOXIB 100 MG: 100 CAPSULE ORAL at 20:33

## 2021-03-07 RX ADMIN — HYDROXYUREA 2000 MG: 500 CAPSULE ORAL at 20:33

## 2021-03-07 ASSESSMENT — ACTIVITIES OF DAILY LIVING (ADL)
ADLS_ACUITY_SCORE: 11

## 2021-03-07 NOTE — PLAN OF CARE
Assumed care of pt at 1600.    AOx4. Lethargic at times. Taking long walks in halls with SBA. VSS on 1L nc. Reg diet but pt reporting poor appetite. LBM 3/4. Passing flatus. Voiding but not always saving. Lower back pain somewhat managed with PCA Morphine into port with carrier fluid. PIV SL. Social work consulted today. Cont POC.

## 2021-03-07 NOTE — PLAN OF CARE
Pt AVSS. Pt wearing O2 at 2L tonight. Pt removing O2 in sleep with noted decreases in O2 sats to mid 80's. Once O2 applied immediate recovery. Pt pain managed with Morphine PCA at 1.0mg/hr continuous rate with 1.3mg pca doses. Pt also using Aqua K heating pad to lower back.  Pt sleeping at long intervals tonite. Lungs clear, dim in bases. Pt using BSC and voiding good amts w/o difficulty. IV Desferal infusion over 12 hr cont until 1145 this am via right PIV. Cont to monitor pain control.

## 2021-03-07 NOTE — PROGRESS NOTES
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        Plan for Today:   - Continue pain regimen per Care Plan  - erythromycin for eye  - low threshold to eval for acute chest     Sickle cell pain crisis  Hx iron overload   Hypoxic respiratory failure  Presented with 1 day worsening generalized pain, unable to be managed on PO regimen at home.  Hgb 8.3, %retic 14.9, absolute retic 443.7. Mild leukocytosis 13.3 per below. COVID neg. On chart review, has had two ED visits for pain in the last 1.5 weeks.  Last seen in Hematology clinic on 02/24/21 due to more frequent episodes of pain. On Oxycodone 15mg PRN, Celebrex PRN, and APAP PRN at home. Recently stopped OxyContin. Currently afebrile and stable on RA. Last episode of acute chest in 10/2019.        - Hematology consulted, recs appreciated  - Patient care plan for pain management on file   - Pain plan: increased bolus from 1 to 1.25 overnight 3/5. Also on basal rate. Reassess Monday  - Will avoid Toradol for additional pain control as pt on Rivaroxaban  - Celebrex scheduled for additional pain control  - Antiemetics w/ Zofran PRN   - Benadryl PRN   - Bowel regimen w/ docusate and senna   - Continue PTA Hydroxyurea  - Lidoderm patches PRN for back pain  - Daily CBC w/ diff, retics  - Holding Jadenu for now pending ferritin recheck per above  - Low threshold for CXR, EKG, if new fever or hypoxia   - No transfusion unless discussion w/ Heme; if increased O2 needs, call heme, to consider exchange transfusion       Elevated LFTs  Iron Overload:  Chelation per heme, Start Desferal 3g daily for 3 days,  "through 3/8    PE - V/Q scan on 1/31 initially read as negative, re-read on 2/1 positive for acute PE.  Started on Rivaroxaban at that time.  Currently stable on room air.    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  Recently stopped ASA per Hematology recs.  Has RUE contracture at baseline and cognitive delay.    - Continue to hold ASA while on Rivaroxaban    Asthma - Stable.  Denies dyspnea and cough.   - Continue PTA Symbicort and PRN Albuterol      Depression, anxiety - Patient with non-depressed affect at present.  - Continue PTA Sertraline 200mg daily        Diet: Regular Diet Adult    DVT Prophylaxis: Rivaroxiban (PTA)  Lopez Catheter: not present  Code Status: Full Code           Disposition Plan   Expected discharge: 2 - 3 days, recommended to prior living arrangement once adequate pain management/ tolerating PO medications.  Entered: Carlo Wheatley MD 03/07/2021, 8:15 AM       The patient's care was discussed with the patient and nurse    Carlo Wheatley MD  Hospitalist Service, [address]  Contact information available via [facility] Paging/Directory  Please see sign in/sign out for up to date coverage information  ______________________________________________________________________    Interval History   Pt is having significant pain this a.m.. also, again needing some oxygen.   She denies any new CP, SOB, fever. Says current pain is \"all over\" but especially bad in the chest area.     Data reviewed today: I reviewed all medications, new labs and imaging results over the last 24 hours.    Physical Exam   Vital Signs: Temp: 97.2  F (36.2  C) Temp src: Axillary BP: 116/60 Pulse: 81   Resp: 18 SpO2: 97 % O2 Device: Nasal cannula Oxygen Delivery: 2 LPM  Weight: 165 lbs 4.8 oz  General Appearance: Soft-spoken and grimaces with movement, not in acute " distress  Respiratory: CTAB  Cardiovascular: RRR, no m/r/g  GI: Abd soft, non-distended, non-tender  Skin: No bruising, lesions noted  Neuro/Psych: Alert and oriented, answers questions appropriately, non-depressed affect     Data   Recent Labs   Lab 03/06/21  0748 03/05/21  0743 03/04/21  1449   WBC 11.5* 11.2* 13.3*   HGB 7.1* 7.4* 8.3*   MCV 84 84 84   * 545* 578*    138 136   POTASSIUM 4.0 3.6 3.6   CHLORIDE 110* 110* 109   CO2 24 22 24   BUN 8 8 10   CR 0.51* 0.51* 0.50*   ANIONGAP 4 5 4   MICAH 8.6 8.6 8.9   GLC 87 89 93   ALBUMIN 3.5 3.3*  --    PROTTOTAL 8.0 7.7  --    BILITOTAL 3.0* 2.0*  --    ALKPHOS 73 65  --    * 129*  --    * 108*  --      Recent Results (from the past 24 hour(s))   XR Chest Port 1 View    Narrative    Exam: XR CHEST PORT 1 VW, 3/6/2021 9:19 AM    Indication: sickle cell, increased O2    Comparison: Chest radiograph 2/28/2021.    Findings:   AP portable upright chest radiograph. Left-sided Port-A-Cath with tip  in the low SVC towards the cavoatrial junction. Linear density  overlying the left lower thorax outside of the patient. There is also  a suspected clothing artifact overlying the thoracic inlet.    Heart size appears slightly more prominent although likely due to the  AP versus PA technique. No acute pleural abnormality noted. No  consolidation. There is likely some minor basilar atelectasis. No  pneumothorax.      Impression    Impression: There may be some minor basal atelectasis demonstrated.  Otherwise stable appearing radiograph.    KALI ROSARIO MD     Medications     morphine       - MEDICATION INSTRUCTIONS -       sodium chloride 10 mL/hr at 03/06/21 3717       celecoxib  100 mg Oral BID     deferoxamine (DESFERAL) intermittent infusion ADULT  3,000 mg Intravenous Daily at 8 pm     docusate sodium  100 mg Oral BID     fluticasone-vilanterol  1 puff Inhalation Daily     folic acid  1 mg Oral Daily     hydroxyurea  2,000 mg Oral QPM      lidocaine   Transdermal Q8H     rivaroxaban ANTICOAGULANT  20 mg Oral Daily with supper     senna-docusate  1 tablet Oral BID    Or     senna-docusate  2 tablet Oral BID     sertraline  200 mg Oral Daily

## 2021-03-07 NOTE — PLAN OF CARE
AOx4. VSS on ra to 1L humidified O2. Pt lethargic at times. Pt reported new L eye pain this AM. L eye had white drainage and was red. Provider seen pt and ordered ophthalmic ointment. LS diminished. +flatus, LBM 3/6. Voiding but not always saving. Low back pain managed with PCA Morphine and aqua K pad. Port infusing with PCA Morphine and IVMF. Pt would like nursing staff to cluster cares as much as possible. PIV SL. Cont POC.

## 2021-03-07 NOTE — PLAN OF CARE
Assumed care of patient [id number]. VSS on 1L O2. AOx4. Lethargic at times. Regular diet but has poor appetite. LBM 3/4. Passing flatus. Voiding but not always saving. Lower back pain somewhat managed with PCA Morphine into port. PIV SL. Continue to monitor.

## 2021-03-07 NOTE — PROVIDER NOTIFICATION
Notified provider at 1926 regarding if we can get pt additional pain meds. Provider authorized pain med while pt is on PCA Dilaudid.

## 2021-03-07 NOTE — PROVIDER NOTIFICATION
Notified provider regarding pt eye painful, reddened and white drainage. Asked provider to assess pt.

## 2021-03-08 ENCOUNTER — APPOINTMENT (OUTPATIENT)
Dept: GENERAL RADIOLOGY | Facility: CLINIC | Age: 21
DRG: 811 | End: 2021-03-08
Attending: INTERNAL MEDICINE
Payer: COMMERCIAL

## 2021-03-08 LAB
ALBUMIN SERPL-MCNC: 3.4 G/DL (ref 3.4–5)
ALP SERPL-CCNC: 75 U/L (ref 40–150)
ALT SERPL W P-5'-P-CCNC: 152 U/L (ref 0–50)
ANION GAP SERPL CALCULATED.3IONS-SCNC: 4 MMOL/L (ref 3–14)
ANISOCYTOSIS BLD QL SMEAR: ABNORMAL
AST SERPL W P-5'-P-CCNC: 144 U/L (ref 0–45)
BASOPHILS # BLD AUTO: 0.2 10E9/L (ref 0–0.2)
BASOPHILS NFR BLD AUTO: 1.8 %
BILIRUB SERPL-MCNC: 3.5 MG/DL (ref 0.2–1.3)
BUN SERPL-MCNC: 8 MG/DL (ref 7–30)
CALCIUM SERPL-MCNC: 8.5 MG/DL (ref 8.5–10.1)
CHLORIDE SERPL-SCNC: 112 MMOL/L (ref 94–109)
CO2 SERPL-SCNC: 26 MMOL/L (ref 20–32)
CREAT SERPL-MCNC: 0.61 MG/DL (ref 0.52–1.04)
DIFFERENTIAL METHOD BLD: ABNORMAL
EOSINOPHIL # BLD AUTO: 1 10E9/L (ref 0–0.7)
EOSINOPHIL NFR BLD AUTO: 7.3 %
ERYTHROCYTE [DISTWIDTH] IN BLOOD BY AUTOMATED COUNT: 28.3 % (ref 10–15)
FERRITIN SERPL-MCNC: ABNORMAL NG/ML (ref 12–150)
GFR SERPL CREATININE-BSD FRML MDRD: >90 ML/MIN/{1.73_M2}
GLUCOSE SERPL-MCNC: 95 MG/DL (ref 70–99)
HCT VFR BLD AUTO: 19.3 % (ref 35–47)
HGB BLD-MCNC: 6.7 G/DL (ref 11.7–15.7)
IRON SERPL-MCNC: 176 UG/DL (ref 35–180)
LACTATE BLD-SCNC: 0.9 MMOL/L (ref 0.7–2)
LYMPHOCYTES # BLD AUTO: 2.8 10E9/L (ref 0.8–5.3)
LYMPHOCYTES NFR BLD AUTO: 20.9 %
MCH RBC QN AUTO: 28 PG (ref 26.5–33)
MCHC RBC AUTO-ENTMCNC: 34.7 G/DL (ref 31.5–36.5)
MCV RBC AUTO: 81 FL (ref 78–100)
MONOCYTES # BLD AUTO: 0.4 10E9/L (ref 0–1.3)
MONOCYTES NFR BLD AUTO: 2.7 %
NEUTROPHILS # BLD AUTO: 8.9 10E9/L (ref 1.6–8.3)
NEUTROPHILS NFR BLD AUTO: 67.3 %
NRBC # BLD AUTO: 1 10*3/UL
NRBC BLD AUTO-RTO: 7 /100
PLATELET # BLD AUTO: 468 10E9/L (ref 150–450)
PLATELET # BLD EST: ABNORMAL 10*3/UL
POIKILOCYTOSIS BLD QL SMEAR: ABNORMAL
POLYCHROMASIA BLD QL SMEAR: ABNORMAL
POTASSIUM SERPL-SCNC: 4 MMOL/L (ref 3.4–5.3)
PROT SERPL-MCNC: 7.1 G/DL (ref 6.8–8.8)
RBC # BLD AUTO: 2.39 10E12/L (ref 3.8–5.2)
RBC INCLUSIONS BLD: SLIGHT
RETICS # AUTO: 782.5 10E9/L (ref 25–95)
RETICS/RBC NFR AUTO: 32.7 % (ref 0.5–2)
SICKLE CELLS BLD QL SMEAR: ABNORMAL
SODIUM SERPL-SCNC: 141 MMOL/L (ref 133–144)
TARGETS BLD QL SMEAR: SLIGHT
WBC # BLD AUTO: 13.2 10E9/L (ref 4–11)

## 2021-03-08 PROCEDURE — 71046 X-RAY EXAM CHEST 2 VIEWS: CPT

## 2021-03-08 PROCEDURE — 85045 AUTOMATED RETICULOCYTE COUNT: CPT | Performed by: INTERNAL MEDICINE

## 2021-03-08 PROCEDURE — 80053 COMPREHEN METABOLIC PANEL: CPT | Performed by: INTERNAL MEDICINE

## 2021-03-08 PROCEDURE — 250N000009 HC RX 250: Performed by: INTERNAL MEDICINE

## 2021-03-08 PROCEDURE — 99233 SBSQ HOSP IP/OBS HIGH 50: CPT | Performed by: INTERNAL MEDICINE

## 2021-03-08 PROCEDURE — 120N000002 HC R&B MED SURG/OB UMMC

## 2021-03-08 PROCEDURE — 85004 AUTOMATED DIFF WBC COUNT: CPT | Performed by: INTERNAL MEDICINE

## 2021-03-08 PROCEDURE — 99232 SBSQ HOSP IP/OBS MODERATE 35: CPT | Mod: GC | Performed by: INTERNAL MEDICINE

## 2021-03-08 PROCEDURE — 71046 X-RAY EXAM CHEST 2 VIEWS: CPT | Mod: 26 | Performed by: RADIOLOGY

## 2021-03-08 PROCEDURE — 250N000011 HC RX IP 250 OP 636: Performed by: PEDIATRICS

## 2021-03-08 PROCEDURE — 36592 COLLECT BLOOD FROM PICC: CPT | Performed by: INTERNAL MEDICINE

## 2021-03-08 PROCEDURE — 250N000013 HC RX MED GY IP 250 OP 250 PS 637: Performed by: NURSE PRACTITIONER

## 2021-03-08 PROCEDURE — 250N000011 HC RX IP 250 OP 636: Performed by: INTERNAL MEDICINE

## 2021-03-08 PROCEDURE — 83605 ASSAY OF LACTIC ACID: CPT | Performed by: INTERNAL MEDICINE

## 2021-03-08 PROCEDURE — 258N000003 HC RX IP 258 OP 636: Performed by: PEDIATRICS

## 2021-03-08 PROCEDURE — 85027 COMPLETE CBC AUTOMATED: CPT | Performed by: INTERNAL MEDICINE

## 2021-03-08 PROCEDURE — 82728 ASSAY OF FERRITIN: CPT | Performed by: INTERNAL MEDICINE

## 2021-03-08 PROCEDURE — 83540 ASSAY OF IRON: CPT | Performed by: INTERNAL MEDICINE

## 2021-03-08 PROCEDURE — 250N000013 HC RX MED GY IP 250 OP 250 PS 637: Performed by: STUDENT IN AN ORGANIZED HEALTH CARE EDUCATION/TRAINING PROGRAM

## 2021-03-08 RX ORDER — HYDROMORPHONE HYDROCHLORIDE 1 MG/ML
0.5 INJECTION, SOLUTION INTRAMUSCULAR; INTRAVENOUS; SUBCUTANEOUS ONCE
Status: COMPLETED | OUTPATIENT
Start: 2021-03-08 | End: 2021-03-08

## 2021-03-08 RX ADMIN — HYDROMORPHONE HYDROCHLORIDE 0.5 MG: 1 INJECTION, SOLUTION INTRAMUSCULAR; INTRAVENOUS; SUBCUTANEOUS at 06:42

## 2021-03-08 RX ADMIN — CELECOXIB 100 MG: 100 CAPSULE ORAL at 09:15

## 2021-03-08 RX ADMIN — CELECOXIB 100 MG: 100 CAPSULE ORAL at 20:25

## 2021-03-08 RX ADMIN — HYDROXYUREA 2000 MG: 500 CAPSULE ORAL at 20:24

## 2021-03-08 RX ADMIN — ERYTHROMYCIN 1 G: 5 OINTMENT OPHTHALMIC at 11:46

## 2021-03-08 RX ADMIN — ERYTHROMYCIN 1 G: 5 OINTMENT OPHTHALMIC at 09:15

## 2021-03-08 RX ADMIN — ERYTHROMYCIN 1 G: 5 OINTMENT OPHTHALMIC at 16:15

## 2021-03-08 RX ADMIN — FOLIC ACID 1 MG: 1 TABLET ORAL at 09:15

## 2021-03-08 RX ADMIN — FLUTICASONE FUROATE AND VILANTEROL TRIFENATATE 1 PUFF: 200; 25 POWDER RESPIRATORY (INHALATION) at 09:15

## 2021-03-08 RX ADMIN — RIVAROXABAN 20 MG: 20 TABLET, FILM COATED ORAL at 17:09

## 2021-03-08 RX ADMIN — Medication: at 02:32

## 2021-03-08 RX ADMIN — SERTRALINE HYDROCHLORIDE 200 MG: 100 TABLET ORAL at 09:15

## 2021-03-08 RX ADMIN — DEFEROXAMINE MESYLATE 3000 MG: 95 INJECTION, POWDER, LYOPHILIZED, FOR SOLUTION INTRAMUSCULAR; INTRAVENOUS; SUBCUTANEOUS at 20:29

## 2021-03-08 RX ADMIN — ERYTHROMYCIN 1 G: 5 OINTMENT OPHTHALMIC at 20:25

## 2021-03-08 ASSESSMENT — ACTIVITIES OF DAILY LIVING (ADL)
ADLS_ACUITY_SCORE: 13
DEPENDENT_IADLS:: CLEANING;COOKING;SHOPPING;TRANSPORTATION

## 2021-03-08 NOTE — PROGRESS NOTES
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        Plan for Today:   - Continue pain regimen per Care Plan  - CXR      Sickle cell pain crisis  Hx iron overload   Hypoxic respiratory failure  Presented with 1 day worsening generalized pain, unable to be managed on PO regimen at home.  Hgb 8.3, %retic 14.9, absolute retic 443.7. Mild leukocytosis 13.3 per below. COVID neg. On chart review, has had two ED visits for pain in the last 1.5 weeks.  Last seen in Hematology clinic on 02/24/21 due to more frequent episodes of pain. On Oxycodone 15mg PRN, Celebrex PRN, and APAP PRN at home. Recently stopped OxyContin. Currently afebrile and stable on RA. Last episode of acute chest in 10/2019.        - Hematology consulted, recs appreciated  - Patient care plan for pain management on file   - Pain plan: increased bolus from 1 to 1.25 overnight 3/5. Also on basal rate. Reassess Tuesday  - Will avoid Toradol for additional pain control as pt on Rivaroxaban  - Celebrex scheduled for additional pain control  - Antiemetics w/ Zofran PRN   - Benadryl PRN   - Bowel regimen w/ docusate and senna   - Continue PTA Hydroxyurea  - Lidoderm patches PRN for back pain  - Daily CBC w/ diff, retics  - Holding Jadenu for now pending ferritin recheck per above  - Low threshold for CXR, EKG, if new fever or hypoxia   - No transfusion unless discussion w/ Heme; if increased O2 needs, call heme, to consider exchange transfusion       Elevated LFTs  Iron Overload:  Chelation per heme, Desferal 3g daily for 6 days  PE - V/Q scan on 1/31 initially read as negative, re-read on 2/1  "positive for acute PE.  Started on Rivaroxaban at that time.  Currently stable on room air.    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  Recently stopped ASA per Hematology recs.  Has RUE contracture at baseline and cognitive delay.    - Continue to hold ASA while on Rivaroxaban    Asthma - Stable.  Denies dyspnea and cough.   - Continue PTA Symbicort and PRN Albuterol      Depression, anxiety - Patient with non-depressed affect at present.  - Continue PTA Sertraline 200mg daily        Diet: Regular Diet Adult    DVT Prophylaxis: Rivaroxiban (PTA)  Lopez Catheter: not present  Code Status: Full Code           Disposition Plan   Expected discharge: 2 - 3 days, recommended to prior living arrangement once adequate pain management/ tolerating PO medications.  Entered: Carlo Wheatley MD 03/07/2021, 8:49 PM       The patient's care was discussed with the patient and nurse    Carlo Wheatley MD  Hospitalist Service, [address]  Contact information available via [facility] Paging/Directory  Please see sign in/sign out for up to date coverage information  ______________________________________________________________________    Interval History   Pain still present, not improving.  Not much worse. also, again needing some oxygen.   She denies any new CP, SOB, fever. Says current pain is \"all over\" but especially bad in the chest area.     Data reviewed today: I reviewed all medications, new labs and imaging results over the last 24 hours.    Physical Exam   Vital Signs: Temp: 97.1  F (36.2  C) Temp src: Axillary BP: 134/60 Pulse: 80   Resp: 18 SpO2: 93 % O2 Device: Nasal cannula with humidification Oxygen Delivery: 2 LPM  Weight: 165 lbs 4.8 oz  General Appearance: Soft-spoken and grimaces with movement, not in acute distress  Respiratory: CTAB  Cardiovascular: RRR, " no m/r/g  GI: Abd soft, non-distended, non-tender  Skin: No bruising, lesions noted  Neuro/Psych: Alert and oriented, answers questions appropriately, non-depressed affect     Data   Recent Labs   Lab 03/07/21  0826 03/06/21  0748 03/05/21  0743   WBC 10.6 11.5* 11.2*   HGB 7.0* 7.1* 7.4*   MCV 83 84 84   * 510* 545*    139 138   POTASSIUM 4.0 4.0 3.6   CHLORIDE 110* 110* 110*   CO2 24 24 22   BUN 8 8 8   CR 0.54 0.51* 0.51*   ANIONGAP 5 4 5   MICAH 8.6 8.6 8.6   GLC 83 87 89   ALBUMIN 3.5 3.5 3.3*   PROTTOTAL 8.0 8.0 7.7   BILITOTAL 2.8* 3.0* 2.0*   ALKPHOS 74 73 65   * 149* 129*   * 124* 108*     No results found for this or any previous visit (from the past 24 hour(s)).  Medications     morphine       - MEDICATION INSTRUCTIONS -       sodium chloride 10 mL/hr at 03/06/21 2356       celecoxib  100 mg Oral BID     deferoxamine (DESFERAL) intermittent infusion ADULT  3,000 mg Intravenous Daily at 8 pm     docusate sodium  100 mg Oral BID     erythromycin   Left Eye 4x Daily     fluticasone-vilanterol  1 puff Inhalation Daily     folic acid  1 mg Oral Daily     hydroxyurea  2,000 mg Oral QPM     lidocaine   Transdermal Q8H     rivaroxaban ANTICOAGULANT  20 mg Oral Daily with supper     senna-docusate  1 tablet Oral BID    Or     senna-docusate  2 tablet Oral BID     sertraline  200 mg Oral Daily

## 2021-03-08 NOTE — CONSULTS
Care Management Initial Consult    General Information  Assessment completed with: Patient     Type of CM/SW Visit: Initial Assessment    Primary Care Provider verified and updated as needed: Yes   Readmission within the last 30 days: no previous admission in last 30 days(Has three ER visits in the past month, but was not admitted).   Reason for Consult: other (see comments)(elevated risk score )  Advance Care Planning: Advance Care Planning Reviewed: education/resources on health care directives provided -  HCD form provided.        Communication Assessment  Patient's communication style: spoken language (English or Bilingual)    Hearing Difficulty or Deaf: no   Wear Glasses or Blind: no    Cognitive  Cognitive/Neuro/Behavioral: WDL                      Living Environment:   People in home: parent(s)(mom)  Rohini Cervantes (mother), siblings.   Current living Arrangements: house      Able to return to prior arrangements: yes  Living Arrangement Comments: Patient has been working with a housing worker to find her own housing, would like to move out of her house.     Family/Social Support:  Care provided by: parent(s)  Provides care for: no one  Marital Status: Single  Parent(s)          Description of Support System: Involved    Support Assessment: Adequate family and caregiver support. Patient lives with her mother, but would like to move out on her own and is working with a housing worker.     Current Resources:   Patient receiving home care services: No     Community Resources: Pt is on a CADI waiver. She is unsure who her  is. She receives PCA services - unsure how many hours per day. Mom is PCA/ILS worker. ([facility]).  Equipment currently used at home: none  Supplies currently used at home: None    Employment/Financial:  Employment Status: Disabled        Financial Concerns: No concerns identified           Lifestyle & Psychosocial Needs:        Socioeconomic History     Marital  status: Single     Spouse name: Not on file     Number of children: Not on file     Years of education: Not on file     Highest education level: Not on file     Tobacco Use     Smoking status: Never Smoker     Smokeless tobacco: Never Used   Substance and Sexual Activity     Alcohol use: Not Currently     Alcohol/week: 0.0 standard drinks     Drug use: Never     Sexual activity: Not Currently     Partners: Male     Birth control/protection: Other       Functional Status:  Prior to admission patient needed assistance:   Dependent ADLs:: Bathing, Dressing, Grooming  Dependent IADLs:: Cleaning, Cooking, Shopping, Transportation (mom assists as needed )  Assesssment of Functional Status: Needs assistance with dressing, Needs assistance with bathing, Needs assistance with shopping, Needs assistance with transportation, At functional baseline    Mental Health Status:  Mental Health Status: No Current Concerns  Mental Health Management: Medication(Depression dx, takes sertraline).     Chemical Dependency Status:  Chemical Dependency Status: No Current Concerns             Values/Beliefs:  Spiritual, Cultural Beliefs, Mormonism Practices, Values that affect care: no               Additional Information:  Met with patient at bedside. Discussed current supports, which include CADI services (PCA, ILS, housing worker). Pt's mother is her PCA and ILS worker. She is working on moving out of her mother's home. She reports needing daily assistance with dressing, bathing, and grooming, as well as some assistance with cleaning, cooking, and errands. At this time, the plan is for patient to return home in 1-2 days with resumption of current services. SW will remain available if discharge needs arise.    Provided pt with healthcare directive form, per request. Pt is aware that she can have this notarized in the hospital.      Patient verbalized understanding of how to contact her insurance plan to arrange a medical ride upon hospital  discharge.    CARLOS Campbell, [address] Acute Care Unit   PALAK Oates  Phone: [phone]  Pager: [phone]

## 2021-03-08 NOTE — PROVIDER NOTIFICATION
Notified MD at 9:20 AM regarding critical results read back.      Spoke with: Dr. Wheatley    Orders in process.    Comments: Spoke with Dr. Wheatley in patient's room about Hgb level this AM which was 6.7, currently awaiting orders.    Addendum: holding off on ordering replacement of RBCs.

## 2021-03-08 NOTE — PLAN OF CARE
Tachycardic at times, AOVSS on 2-3L humidified nasal cannula. Pt A&Ox4 yet lethargic at times. Denies nausea. Generalized pain especially in back somewhat controlled with morphine PCA. Pt given 1x IV Dilaudid at start of shift d/t sharp back pain not alleviated by PCA.  New oral lesion not noted on R side of tongue, pt was given NS oral wishes and encouraged to perform often, will continue to monitor. L eye with scant drainage, ophthalmic ointment given per orders, will continue to monitor. Port infusing PCA morphine. PIV infusing 12 hour Desferal infusion. Continue with plan of care.     Addendum: Pt again experiencing uncontrolled pain late in shift, Carlos whyte MD notified and 1x IV Dilaudid ordered and given with relief.

## 2021-03-08 NOTE — PROGRESS NOTES
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    #Sickle Cell pain crisis with hypoxia  #Recent PE  Jennifer presents with worsening pain despite home medications. Hgb stable within baseline 6-8 with appropriate retic response.  Tbili, AST and ALT elevated, within no previous ranges for crises. Tbili in particular has been higher with pain crises. She is hypoxic, but is afebrile with no respiratory distress and CXR today (3/8) without consolidations. As such, no specific concern for acute chest syndrome at this time.  Pain remains prominent, but morphine PCA helps somewhat. She did appear fairly drowsy today, so will need careful adjustment of PCA. Suspect the hypoxia may be in part related to mild hypoventilation given the drowsiness.     Overall, even with her mild hypoxia, this appears to be a fairly uncomplicated pain crisis. Would consider RUQ US if she develops RUQ pain or rapid rise in LFTs out of proportion to pain.     #Iron Overload  Iron overload due to chronic transfusions as secondary prevention for stroke. Ferritin >8400 on admission, up to 11k despite initiation of Desferal this hospitalization. Case discussed with Dr. Duncan. We will continue with Desferal while inpatient.     Recommendations:  - Continue pain control per primary team  - Continue Tylenol and topical agents for adjuntive therapy  - Okay for home Celebrex, hold other NSAIDs while on Xarelto              - Please ensure adequate bowel regimen               - Encourage  "incentive spirometry  - Continue with home Xarelto  - Continue with Desferal 3g daily, per Dr. Duncan  - Continue folate 1g/day   - Continue PTA Hydrea  - Continue with fluids as needed, pending PO intake  - Please obtain CBC with diff and retics daily  - Do NOT transfuse blood unless discussed with Hematology first  - Please page hematology if patient develops fever, acute shortness of breath or other symptoms of acute chest syndrome    Plan of care was discussed with attending physician Dr. Hernandez.    We will continue to follow this patient. Please don't hesitate to contact the Fellow On-Call with questions.    Isaac Longoria MD PhD  Heme/Onc/Transplant Fellow  Pgr [phone]        HEMATOLOGY STAFF:  Discussed with fellow, whose note reflects our joint evaluation, assessment, and plan.  Also discussed case with her primary hematologist, Dr. Duncan.      Jose Manuel Hernandez MD  Associate Professor of Medicine  Division of Hematology, Oncology, and Transplantation  Director, [facility]      ___________________________________________________________________    Subjective & Interval History:    - Remains on 2LPM by NC; stable since overnight 3/6 - 3/7  - No respiratory distress  - CXR this AM w/o consolidations  - Pain remains an issue      Physical Exam:    /82 (BP Location: Left arm)   Pulse 94   Temp 97.9  F (36.6  C) (Oral)   Resp 18   Ht 1.626 m (5' 4\")   Wt 75 kg (165 lb 4.8 oz)   SpO2 90%   BMI 28.37 kg/m    Gen: Laying in bed, somewhat sleepy  HEENT: EOMI, PERRL  CV: Normal rate, regular  Pulm: Breathing comfortably on room air, no adventitious lung sounds  Abd: Soft, nt/nd, no rebound/guarding  Ext: Warm and well perfused. No lower extremity edema  Skin: No rash, cyanosis or petechial lesion  Neuro: Answering questions appropriately, but slow at times. R hand contracture.    Labs & Studies: I personally reviewed the following studies:  ROUTINE LABS (Last " four results):  CMP  Recent Labs   Lab 03/08/21  0832 03/07/21  0826 03/06/21  0748 03/05/21  0743    138 139 138   POTASSIUM 4.0 4.0 4.0 3.6   CHLORIDE 112* 110* 110* 110*   CO2 26 24 24 22   ANIONGAP 4 5 4 5   GLC 95 83 87 89   BUN 8 8 8 8   CR 0.61 0.54 0.51* 0.51*   GFRESTIMATED >90 >90 >90 >90   GFRESTBLACK >90 >90 >90 >90   MICAH 8.5 8.6 8.6 8.6   PROTTOTAL 7.1 8.0 8.0 7.7   ALBUMIN 3.4 3.5 3.5 3.3*   BILITOTAL 3.5* 2.8* 3.0* 2.0*   ALKPHOS 75 74 73 65   * 136* 124* 108*   * 153* 149* 129*     CBC  Recent Labs   Lab 03/08/21  0832 03/07/21  0826 03/06/21  0748 03/05/21  0743   WBC 13.2* 10.6 11.5* 11.2*   RBC 2.39* 2.54* 2.54* 2.66*   HGB 6.7* 7.0* 7.1* 7.4*   HCT 19.3* 21.0* 21.2* 22.3*   MCV 81 83 84 84   MCH 28.0 27.6 28.0 27.8   MCHC 34.7 33.3 33.5 33.2   RDW 28.3* 26.0* 26.2* 25.7*   * 488* 510* 545*     INRNo lab results found in last 7 days.    Medications list for reference:  Current Facility-Administered Medications   Medication     acetaminophen (TYLENOL) tablet 650 mg     albuterol (PROAIR HFA/PROVENTIL HFA/VENTOLIN HFA) 108 (90 Base) MCG/ACT inhaler 2 puff     celecoxib (celeBREX) capsule 100 mg     deferoxamine (DESFERAL) 3,000 mg in sodium chloride 0.9 % 500 mL intermittent infusion     diphenhydrAMINE (BENADRYL) capsule 25-50 mg     docusate sodium (COLACE) capsule 100 mg     erythromycin (ROMYCIN) ophthalmic ointment     fluticasone-vilanterol (BREO ELLIPTA) 200-25 MCG/INH inhaler 1 puff     folic acid (FOLVITE) tablet 1 mg     hydroxyurea (HYDREA) capsule 2,000 mg     [START ON 3/9/2021] influenza quadrivalent (PF) vacc (FLUZONE) injection 0.5 mL     Lidocaine (LIDOCARE) 4 % Patch 1-3 patch    And     lidocaine patch in PLACE     lidocaine (LMX4) cream     lidocaine 1 % 0.1-1 mL     melatonin tablet 1 mg     morphine  PCA 1 mg/mL OPIOID TOLERANT     ondansetron (ZOFRAN-ODT) ODT tab 4 mg    Or     ondansetron (ZOFRAN) injection 4 mg     Patient is already receiving  anticoagulation with heparin, enoxaparin (LOVENOX), warfarin (COUMADIN)  or other anticoagulant medication     polyethylene glycol (MIRALAX) Packet 17 g     rivaroxaban ANTICOAGULANT (XARELTO) tablet 20 mg     senna-docusate (SENOKOT-S/PERICOLACE) 8.6-50 MG per tablet 1 tablet    Or     senna-docusate (SENOKOT-S/PERICOLACE) 8.6-50 MG per tablet 2 tablet     senna-docusate (SENOKOT-S/PERICOLACE) 8.6-50 MG per tablet 1 tablet    Or     senna-docusate (SENOKOT-S/PERICOLACE) 8.6-50 MG per tablet 2 tablet     sertraline (ZOLOFT) tablet 200 mg     sodium chloride (PF) 0.9% PF flush 3 mL     sodium chloride (PF) 0.9% PF flush 3 mL     sodium chloride 0.9% infusion

## 2021-03-08 NOTE — PLAN OF CARE
Tachycardic at times, 2 L NC. OAVSS. Alert and oriented x 4. Port with PCA morphine and IVMF NS at 10 mL/hr. R PIV SL. IV Dilaudid PRN for additional pain. Regular diet, tolerating fair. Had BM yesterday per patient, passing flatus, voids spont, adequate amount. Up SBA or independently. Had a bed bath today and CHG wipes used. Doing NS swish and spit for oral lesion, L eye drainage scant seen, ointment given.

## 2021-03-09 LAB
ALBUMIN SERPL-MCNC: 3.2 G/DL (ref 3.4–5)
ALP SERPL-CCNC: 81 U/L (ref 40–150)
ALT SERPL W P-5'-P-CCNC: 139 U/L (ref 0–50)
AST SERPL W P-5'-P-CCNC: 132 U/L (ref 0–45)
BILIRUB DIRECT SERPL-MCNC: 0.9 MG/DL (ref 0–0.2)
BILIRUB SERPL-MCNC: 4.2 MG/DL (ref 0.2–1.3)
ERYTHROCYTE [DISTWIDTH] IN BLOOD BY AUTOMATED COUNT: 31.2 % (ref 10–15)
HCT VFR BLD AUTO: 18.3 % (ref 35–47)
HGB BLD-MCNC: 6.4 G/DL (ref 11.7–15.7)
LACTATE BLD-SCNC: 0.9 MMOL/L (ref 0.7–2)
MCH RBC QN AUTO: 28.8 PG (ref 26.5–33)
MCHC RBC AUTO-ENTMCNC: 35 G/DL (ref 31.5–36.5)
MCV RBC AUTO: 82 FL (ref 78–100)
PLATELET # BLD AUTO: 387 10E9/L (ref 150–450)
PROT SERPL-MCNC: 7.4 G/DL (ref 6.8–8.8)
RBC # BLD AUTO: 2.22 10E12/L (ref 3.8–5.2)
RETICS # AUTO: 617.2 10E9/L (ref 25–95)
RETICS/RBC NFR AUTO: 27.8 % (ref 0.5–2)
WBC # BLD AUTO: 13.4 10E9/L (ref 4–11)

## 2021-03-09 PROCEDURE — 250N000011 HC RX IP 250 OP 636: Performed by: PEDIATRICS

## 2021-03-09 PROCEDURE — 36592 COLLECT BLOOD FROM PICC: CPT | Performed by: INTERNAL MEDICINE

## 2021-03-09 PROCEDURE — 999N000127 HC STATISTIC PERIPHERAL IV START W US GUIDANCE

## 2021-03-09 PROCEDURE — 250N000009 HC RX 250: Performed by: INTERNAL MEDICINE

## 2021-03-09 PROCEDURE — 99232 SBSQ HOSP IP/OBS MODERATE 35: CPT | Performed by: INTERNAL MEDICINE

## 2021-03-09 PROCEDURE — 99233 SBSQ HOSP IP/OBS HIGH 50: CPT | Performed by: INTERNAL MEDICINE

## 2021-03-09 PROCEDURE — 250N000013 HC RX MED GY IP 250 OP 250 PS 637: Performed by: NURSE PRACTITIONER

## 2021-03-09 PROCEDURE — 83605 ASSAY OF LACTIC ACID: CPT | Performed by: INTERNAL MEDICINE

## 2021-03-09 PROCEDURE — 120N000002 HC R&B MED SURG/OB UMMC

## 2021-03-09 PROCEDURE — 85027 COMPLETE CBC AUTOMATED: CPT | Performed by: INTERNAL MEDICINE

## 2021-03-09 PROCEDURE — 250N000013 HC RX MED GY IP 250 OP 250 PS 637: Performed by: STUDENT IN AN ORGANIZED HEALTH CARE EDUCATION/TRAINING PROGRAM

## 2021-03-09 PROCEDURE — 250N000011 HC RX IP 250 OP 636: Performed by: INTERNAL MEDICINE

## 2021-03-09 PROCEDURE — 85045 AUTOMATED RETICULOCYTE COUNT: CPT | Performed by: INTERNAL MEDICINE

## 2021-03-09 PROCEDURE — 80076 HEPATIC FUNCTION PANEL: CPT | Performed by: INTERNAL MEDICINE

## 2021-03-09 PROCEDURE — 258N000003 HC RX IP 258 OP 636: Performed by: PEDIATRICS

## 2021-03-09 RX ADMIN — Medication: at 09:01

## 2021-03-09 RX ADMIN — ERYTHROMYCIN 1 G: 5 OINTMENT OPHTHALMIC at 08:42

## 2021-03-09 RX ADMIN — ERYTHROMYCIN 1 G: 5 OINTMENT OPHTHALMIC at 19:49

## 2021-03-09 RX ADMIN — DEFEROXAMINE MESYLATE 3000 MG: 95 INJECTION, POWDER, LYOPHILIZED, FOR SOLUTION INTRAMUSCULAR; INTRAVENOUS; SUBCUTANEOUS at 21:14

## 2021-03-09 RX ADMIN — ERYTHROMYCIN 1 G: 5 OINTMENT OPHTHALMIC at 15:57

## 2021-03-09 RX ADMIN — SERTRALINE HYDROCHLORIDE 200 MG: 100 TABLET ORAL at 08:41

## 2021-03-09 RX ADMIN — ERYTHROMYCIN 1 G: 5 OINTMENT OPHTHALMIC at 12:11

## 2021-03-09 RX ADMIN — CELECOXIB 100 MG: 100 CAPSULE ORAL at 08:40

## 2021-03-09 RX ADMIN — HYDROXYUREA 2000 MG: 500 CAPSULE ORAL at 19:48

## 2021-03-09 RX ADMIN — FOLIC ACID 1 MG: 1 TABLET ORAL at 08:41

## 2021-03-09 RX ADMIN — CELECOXIB 100 MG: 100 CAPSULE ORAL at 19:48

## 2021-03-09 RX ADMIN — RIVAROXABAN 20 MG: 20 TABLET, FILM COATED ORAL at 16:49

## 2021-03-09 RX ADMIN — FLUTICASONE FUROATE AND VILANTEROL TRIFENATATE 1 PUFF: 200; 25 POWDER RESPIRATORY (INHALATION) at 08:42

## 2021-03-09 ASSESSMENT — ACTIVITIES OF DAILY LIVING (ADL)
ADLS_ACUITY_SCORE: 13
ADLS_ACUITY_SCORE: 11
ADLS_ACUITY_SCORE: 15
ADLS_ACUITY_SCORE: 15
ADLS_ACUITY_SCORE: 11
ADLS_ACUITY_SCORE: 13

## 2021-03-09 NOTE — PLAN OF CARE
A&Ox4 but lethargic at time.VSS on 2L of O2. Genralized Pain managed with PCA morphine with some relief. Patient has been using NS flushes for oral lesions. Left eye with redness and scant drainage, on scheduled eye ointment. Up to the bedside commode independently.Adequate urine output. Passing gas, no BM this shift. Port infusing PCA morphine and MIVF TKO. PIV infusing desferal over 12hours, will be done around 0830AM. On regular diet with good appetite, denies nausea.Resting comfortably between care. Continue with plan of care and maintain pain control.

## 2021-03-09 NOTE — PROGRESS NOTES
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    #Sickle Cell pain crisis  Pt presented with worsening pain despite home medications. Hgb stable within baseline 6-8 with appropriate retic response. Tbili increased, consistent with pain crisis. AST and ALT elevated and has been intermittently prior, now mildly downtrended. Developed mild hypoxia with O2 sats to mid-80s overnight 3/6-3/7 and currently remains on 1-3L (sats 90-96%), but denies any respiratory distress/cough and remains afebrile. Most recent CXR (3/8) without consolidations. No specific concern for acute chest syndrome at this time. Suspect the hypoxia may be in part related to mild hypoventilation given her intermittent drowsiness and opioid pain medication.    Pain remains prominent, but morphine PCA helps somewhat. She does feel that she is waiting for the 20 minute lockout to end in order to access more pain medication. She states that the physician yesterday talked about reducing the bolus dose and lockout so that she could access the bolus dose more frequently, but this has not yet happened. She does not feel that Tylenol typically adds benefit to her pain regimen during crises. States she notes some mild relief with the Celebrex but really only takes this because it is prescribed.     Overall, even with her mild hypoxia, this appears to be a fairly uncomplicated pain crisis. Denies abdominal pain but would consider RUQ US if she develops RUQ pain or  rapid rise in LFTs out of proportion to pain.     #Iron Overload  Iron overload due to chronic transfusions as secondary prevention for stroke. Ferritin >8400 on 3/5, up to 11,514 (3/8). Initiated Desferal (x 3/5) this hospitalization. Case discussed with Dr. Duncan. We will continue with Desferal while inpatient.     #PE (dx 2/1/21)  Continues on Xarelto with plan for 3 months of treatment.        Recommendations:  - Continue pain control per primary team  - Continue morphine PCA. Could consider adjusting with decreased bolus dose available more frequently as appears has been discussed with patient.   - of note, per pt's Patient Care Coordination, if no relief with morphine PCA, pt has had success with Ketamine infusion starting at 4mg/hr and advancing to 6mg/hr (previously 8mg/hr made her feel quite poorly)  - Tylenol PRN. Could consider scheduling this with close monitoring of LFTs.  - continue Celebrex, hold other NSAIDs while on Xarelto              - continue bowel regimen  - Encourage incentive spirometry. This is ordered but please ensure IS is provided to pt at bedside.   - Continue with home Xarelto  - Continue with Desferal (s/p 3 doses 3/5-3/7; repeating 3g daily x 3 doses (3/8-3/10), per Dr. Duncan).   - Continue folate 1g/day   - Continue PTA Hydrea  - holding ASA while on Xarelto  - IVFs as needed, pending PO intake  - Please obtain CBC with diff and retics daily  - Do NOT transfuse blood unless discussed with Hematology first  - Please page hematology if patient develops fever, worsening hypoxic respiratory failuire, or acute shortness of breath/cough/pleuritic pain.    Follow-up:   - pt currently has follow-up with primary Hematologist, Dr. Duncan, on 3/26  - Hematology team will determine if she needs to keep her currently scheduled daily infusion appointments 3/15-3/19 (for Desferal) pending hospital course.    Plan of care was discussed with attending physician, Dr. Hernandez.    We will continue  "to follow this patient. Please don't hesitate to contact the Hematology Consult team with questions.    Jennifer Obando PA-C  Heme/Onc  Pager: [phone]  ___________________________________________________________________    Interval History:    No acute events overnight, afebrile. Was on 2L by NC overnight with O2 sats 90-96% per VS record. She took O2 off this AM in order to use the commode and has not placed in back on at time of assessment. She denies feeling SOB, difficulty breathing, or cough. Endorses that the O2 has been mainly for comfort. Pain continues to be her main concern. States it continues to be \"all over\", though denies chest pain this morning. Describes pain in her upper and lower extremities and low back. She does feel that she is waiting for the 20 minute lockout to end in order to access more pain medication. She states that the  physician yesterday talked about reducing the bolus dose and lockout time so that she could access the bolus dose more frequently, but this has not yet happened. She does not feel that Tylenol typically adds benefit to her pain regimen during crises. States she notes some mild relief with the Celebrex but really only takes this because it is prescribed.     She otherwise denies abdominal pain or nausea. Had a BM yesterday; gets up to go into the bathroom for BMs rather than use bedside commode. Denies any change in urination. Denies feeling of palpitations. Denies extremity swelling. Denies headache. Denies mouth pain/sores to this provider today. Continues with left eye irritation that she states started during this admission. The eye ointment initially exacerbates this irritation and she is having some eye tearing after application of the ointment this morning. Reports decent appetite at this time.       Physical Exam:    /55 (BP Location: Left arm)   Pulse 107   Temp 98.7  F (37.1  C) (Oral)   Resp 16   Ht 1.626 m (5' 4\")   Wt 75 kg (165 lb 4.8 oz)   SpO2 " 92%   BMI 28.37 kg/m    Gen: Pleasant female seen reclined in bed. Somewhat sleepy after waking this AM. No acute distress. Appropriately conversant with provider and able to relay recent events.   HEENT: NC/AT. Left eye mild conjunctival redness and tearing (eye ointment just applied).   CV: Mildly tachycardic but regular rhythm.   Pulm: Breathing comfortably on room air (had taken off O2 prior to provider entering), lungs CTA b/l.   Abd: Normal BS. Abd is soft, NT/ND, no rebound/guarding.  Ext: Warm and well perfused. No lower extremity edema.   Skin: No lesions or rashes on exposed skin surfaces of limited exam.   Neuro: Mentation appears normal, speech normal. Answering questions appropriately.       Labs & Studies:     CMP  Recent Labs   Lab 03/09/21  0648 03/08/21  0832 03/07/21  0826 03/06/21  0748 03/05/21  0743   NA  --  141 138 139 138   POTASSIUM  --  4.0 4.0 4.0 3.6   CHLORIDE  --  112* 110* 110* 110*   CO2  --  26 24 24 22   ANIONGAP  --  4 5 4 5   GLC  --  95 83 87 89   BUN  --  8 8 8 8   CR  --  0.61 0.54 0.51* 0.51*   GFRESTIMATED  --  >90 >90 >90 >90   GFRESTBLACK  --  >90 >90 >90 >90   MICAH  --  8.5 8.6 8.6 8.6   PROTTOTAL 7.4 7.1 8.0 8.0 7.7   ALBUMIN 3.2* 3.4 3.5 3.5 3.3*   BILITOTAL 4.2* 3.5* 2.8* 3.0* 2.0*   ALKPHOS 81 75 74 73 65   * 144* 136* 124* 108*   * 152* 153* 149* 129*     CBC  Recent Labs   Lab 03/09/21  0648 03/08/21  0832 03/07/21  0826 03/06/21  0748   WBC 13.4* 13.2* 10.6 11.5*   RBC 2.22* 2.39* 2.54* 2.54*   HGB 6.4* 6.7* 7.0* 7.1*   HCT 18.3* 19.3* 21.0* 21.2*   MCV 82 81 83 84   MCH 28.8 28.0 27.6 28.0   MCHC 35.0 34.7 33.3 33.5   RDW 31.2* 28.3* 26.0* 26.2*    468* 488* 510*       Medications list for reference:  Current Facility-Administered Medications   Medication     acetaminophen (TYLENOL) tablet 650 mg     albuterol (PROAIR HFA/PROVENTIL HFA/VENTOLIN HFA) 108 (90 Base) MCG/ACT inhaler 2 puff     celecoxib (celeBREX) capsule 100 mg     deferoxamine  (DESFERAL) 3,000 mg in sodium chloride 0.9 % 500 mL intermittent infusion     diphenhydrAMINE (BENADRYL) capsule 25-50 mg     docusate sodium (COLACE) capsule 100 mg     erythromycin (ROMYCIN) ophthalmic ointment     fluticasone-vilanterol (BREO ELLIPTA) 200-25 MCG/INH inhaler 1 puff     folic acid (FOLVITE) tablet 1 mg     hydroxyurea (HYDREA) capsule 2,000 mg     influenza quadrivalent (PF) vacc (FLUZONE) injection 0.5 mL     Lidocaine (LIDOCARE) 4 % Patch 1-3 patch    And     lidocaine patch in PLACE     lidocaine (LMX4) cream     lidocaine 1 % 0.1-1 mL     melatonin tablet 1 mg     morphine  PCA 1 mg/mL OPIOID TOLERANT     ondansetron (ZOFRAN-ODT) ODT tab 4 mg    Or     ondansetron (ZOFRAN) injection 4 mg     Patient is already receiving anticoagulation with heparin, enoxaparin (LOVENOX), warfarin (COUMADIN)  or other anticoagulant medication     polyethylene glycol (MIRALAX) Packet 17 g     rivaroxaban ANTICOAGULANT (XARELTO) tablet 20 mg     senna-docusate (SENOKOT-S/PERICOLACE) 8.6-50 MG per tablet 1 tablet    Or     senna-docusate (SENOKOT-S/PERICOLACE) 8.6-50 MG per tablet 2 tablet     senna-docusate (SENOKOT-S/PERICOLACE) 8.6-50 MG per tablet 1 tablet    Or     senna-docusate (SENOKOT-S/PERICOLACE) 8.6-50 MG per tablet 2 tablet     sertraline (ZOLOFT) tablet 200 mg     sodium chloride (PF) 0.9% PF flush 3 mL     sodium chloride (PF) 0.9% PF flush 3 mL     sodium chloride 0.9% infusion

## 2021-03-09 NOTE — PROGRESS NOTES
[facility]    Medicine Progress Note - Hospitalist Service, Gold 8       Date of Admission:  3/4/2021  Assessment & Plan   Jennifer Cervantes is a 21 year old female with past medical history significant for sickle cell anemia, prior CVA c/b R hemiparesis and RUE contracture, recent acute PE (2/1/21), asthma, depression, and anxiety admitted for sickle cell pain crisis.         Plan for Today:   - Will change PCA prn dose to 0.6mg Q10min from 1.5 Q20min. Ct 1mg/hr basal. (Patient looks sleepy so reluctant for any increase). If still concerns for pain, will get Pian team consult for ketamine   - monitor closely. D/W Hematology and hold off on PRBC transfusion      Sickle cell pain crisis  Hx iron overload   Hypoxic respiratory failure  Presented with 1 day worsening generalized pain, unable to be managed on PO regimen at home.  Hgb 8.3, %retic 14.9, absolute retic 443.7. Mild leukocytosis 13.3 per below. COVID neg. On chart review, has had two ED visits for pain in the last 1.5 weeks.  Last seen in Hematology clinic on 02/24/21 due to more frequent episodes of pain. On Oxycodone 15mg PRN, Celebrex PRN, and APAP PRN at home. Recently stopped OxyContin. Currently afebrile and stable on RA. Last episode of acute chest in 10/2019.        - Hematology consulted, recs appreciated  - Patient care plan for pain management on file   - Will avoid Toradol for additional pain control as pt on Rivaroxaban  - Celebrex scheduled for additional pain control  - Antiemetics w/ Zofran PRN   - Benadryl PRN   - Bowel regimen w/ docusate and senna   - Continue PTA Hydroxyurea  - Lidoderm patches PRN for back pain  - Daily CBC w/ diff, retics  - Holding Jadenu for now pending ferritin recheck per above  - Low threshold for CXR, EKG, if new fever or hypoxia   - No transfusion unless discussion w/ Heme; if increased O2 needs, call heme, to consider exchange transfusion       Elevated LFTs  Iron  Overload:  Chelation per heme, Desferal 3g daily for 6 days  PE - V/Q scan on 1/31 initially read as negative, re-read on 2/1 positive for acute PE.  Started on Rivaroxaban at that time.  Currently stable on room air.    - Continue Rivaroxaban 20mg every evening      Hx CVA (left MCA w/ R hemiparesis and RUE contracture) - Per chart review, had a stroke at age 1 and TIA in 2014.  Recently stopped ASA per Hematology recs.  Has RUE contracture at baseline and cognitive delay.    - Continue to hold ASA while on Rivaroxaban     Asthma - Stable.  Denies dyspnea and cough.   - Continue PTA Symbicort and PRN Albuterol       Depression, anxiety - Patient with non-depressed affect at present.  - Continue PTA Sertraline 200mg daily      Diet: Regular Diet Adult    DVT Prophylaxis: Xarelto  Lopez Catheter: not present  Code Status: Full Code         Disposition Plan   Expected discharge: 2 - 3 days, recommended to prior living arrangement once adequate pain management/ tolerating PO medications.  Entered: Brian Sheehan MD, MD 03/09/2021, 3:29 PM     The patient's care was discussed with the Bedside Nurse, Care Coordinator/, Patient and Hematology Consultant.    Brian Sheehan MD, MD  Hospitalist Service, [address]  Contact information available via [facility] Paging/Directory  Please see sign in/sign out for up to date coverage information  ______________________________________________________________________    Interval History   Looks sleepy but feels pain is inadequately controlled and would prefer frequent as needed dosings even at decreased dose.  Denies any chest pain/shortness of breath/palpitations.  Denies no new bowel/bladder problems.  Denies any bleeding    Data reviewed today: I reviewed all medications, new labs and imaging results over the last 24 hours. I personally reviewed no images or EKG's today.    Physical Exam   BP (!) 144/82 (BP Location:  "Right arm)   Pulse 104   Temp 99.2  F (37.3  C) (Oral)   Resp 18   Ht 1.626 m (5' 4\")   Wt 75 kg (165 lb 4.8 oz)   SpO2 95%   BMI 28.37 kg/m    Gen- pleasant  lying in bed  HEENT- NAD, CONNIE  Neck- supple, no JVD elevation, no thyromegaly  CVS- I+II+ no m/r/g  RS- CTAB  Abdo- soft, no tenderness . No g/r/r   Ext- no edema   CNS- no focal signs . Sleepy but awake      Data    BMP  Recent Labs   Lab 03/08/21  0832 03/07/21  0826 03/06/21  0748 03/05/21  0743    138 139 138   POTASSIUM 4.0 4.0 4.0 3.6   CHLORIDE 112* 110* 110* 110*   MICAH 8.5 8.6 8.6 8.6   CO2 26 24 24 22   BUN 8 8 8 8   CR 0.61 0.54 0.51* 0.51*   GLC 95 83 87 89     CBC  Recent Labs   Lab 03/09/21  0648 03/08/21  0832 03/07/21  0826 03/06/21  0748   WBC 13.4* 13.2* 10.6 11.5*   RBC 2.22* 2.39* 2.54* 2.54*   HGB 6.4* 6.7* 7.0* 7.1*   HCT 18.3* 19.3* 21.0* 21.2*   MCV 82 81 83 84   MCH 28.8 28.0 27.6 28.0   MCHC 35.0 34.7 33.3 33.5   RDW 31.2* 28.3* 26.0* 26.2*    468* 488* 510*     INRNo lab results found in last 7 days.  LFTs  Recent Labs   Lab 03/09/21  0648 03/08/21  0832 03/07/21  0826 03/06/21  0748   ALKPHOS 81 75 74 73   * 144* 136* 124*   * 152* 153* 149*   BILITOTAL 4.2* 3.5* 2.8* 3.0*   PROTTOTAL 7.4 7.1 8.0 8.0   ALBUMIN 3.2* 3.4 3.5 3.5      PANCNo lab results found in last 7 days.    No results found for this or any previous visit (from the past 24 hour(s)).  "

## 2021-03-09 NOTE — PLAN OF CARE
A&Ox4 but lethargic at time.VSS on 2L of O2. Genralized Pain managed with PCA morphine with some relief. Patient has been using NS flushes for oral lesions. Left eye with scant drainage, eye ointment given. Up to the bedside commode independently.Adequate urine output. Passing gas, no BM this shift, refused scheduled stool softner. Port infusing PCA morphine and NS TKO. PIV infusing desferal over 12hours, will be done around 0830AM. On regular diet with good appetite, denies nausea. Triggered sepsis, lactate was 0.9. Resting comfortably between care. Continue with plan of care and maintain pain control.

## 2021-03-09 NOTE — PLAN OF CARE
A&Ox4. VSS on 2L O2. Generalized pain managed with PCA morphine with some relief. Denies nausea. Tolerating small amounts of regular diet. L eye with redness and scant drainage- scheduled eye ointment. Up with bedside commode independently, voiding spontaneously. Passing gas, no bm, declined bowel medications. Port infusing PCA morphine and MIVF TKO. Resting between cares. Continue to monitor.

## 2021-03-10 ENCOUNTER — APPOINTMENT (OUTPATIENT)
Dept: GENERAL RADIOLOGY | Facility: CLINIC | Age: 21
DRG: 811 | End: 2021-03-10
Attending: INTERNAL MEDICINE
Payer: COMMERCIAL

## 2021-03-10 LAB
ALBUMIN SERPL-MCNC: 3.1 G/DL (ref 3.4–5)
ALP SERPL-CCNC: 71 U/L (ref 40–150)
ALT SERPL W P-5'-P-CCNC: 108 U/L (ref 0–50)
ANION GAP SERPL CALCULATED.3IONS-SCNC: 7 MMOL/L (ref 3–14)
AST SERPL W P-5'-P-CCNC: 104 U/L (ref 0–45)
BILIRUB SERPL-MCNC: 3 MG/DL (ref 0.2–1.3)
BUN SERPL-MCNC: 7 MG/DL (ref 7–30)
CALCIUM SERPL-MCNC: 8.5 MG/DL (ref 8.5–10.1)
CHLORIDE SERPL-SCNC: 112 MMOL/L (ref 94–109)
CO2 SERPL-SCNC: 24 MMOL/L (ref 20–32)
CREAT SERPL-MCNC: 0.56 MG/DL (ref 0.52–1.04)
ERYTHROCYTE [DISTWIDTH] IN BLOOD BY AUTOMATED COUNT: 33.1 % (ref 10–15)
GFR SERPL CREATININE-BSD FRML MDRD: >90 ML/MIN/{1.73_M2}
GLUCOSE SERPL-MCNC: 80 MG/DL (ref 70–99)
HCT VFR BLD AUTO: 18.1 % (ref 35–47)
HGB BLD-MCNC: 6.4 G/DL (ref 11.7–15.7)
LDH SERPL L TO P-CCNC: 294 U/L (ref 81–234)
MCH RBC QN AUTO: 30.3 PG (ref 26.5–33)
MCHC RBC AUTO-ENTMCNC: 35.4 G/DL (ref 31.5–36.5)
MCV RBC AUTO: 86 FL (ref 78–100)
PLATELET # BLD AUTO: 435 10E9/L (ref 150–450)
POTASSIUM SERPL-SCNC: 3.7 MMOL/L (ref 3.4–5.3)
PROT SERPL-MCNC: 7.1 G/DL (ref 6.8–8.8)
RBC # BLD AUTO: 2.11 10E12/L (ref 3.8–5.2)
RETICS # AUTO: 563.4 10E9/L (ref 25–95)
RETICS/RBC NFR AUTO: 26.7 % (ref 0.5–2)
SODIUM SERPL-SCNC: 143 MMOL/L (ref 133–144)
WBC # BLD AUTO: 9.2 10E9/L (ref 4–11)

## 2021-03-10 PROCEDURE — 93005 ELECTROCARDIOGRAM TRACING: CPT

## 2021-03-10 PROCEDURE — 71046 X-RAY EXAM CHEST 2 VIEWS: CPT

## 2021-03-10 PROCEDURE — 120N000002 HC R&B MED SURG/OB UMMC

## 2021-03-10 PROCEDURE — 36592 COLLECT BLOOD FROM PICC: CPT | Performed by: INTERNAL MEDICINE

## 2021-03-10 PROCEDURE — 93010 ELECTROCARDIOGRAM REPORT: CPT | Performed by: INTERNAL MEDICINE

## 2021-03-10 PROCEDURE — 85045 AUTOMATED RETICULOCYTE COUNT: CPT | Performed by: INTERNAL MEDICINE

## 2021-03-10 PROCEDURE — 258N000003 HC RX IP 258 OP 636: Performed by: PEDIATRICS

## 2021-03-10 PROCEDURE — 250N000009 HC RX 250: Performed by: INTERNAL MEDICINE

## 2021-03-10 PROCEDURE — 250N000013 HC RX MED GY IP 250 OP 250 PS 637: Performed by: NURSE PRACTITIONER

## 2021-03-10 PROCEDURE — 250N000011 HC RX IP 250 OP 636: Performed by: PEDIATRICS

## 2021-03-10 PROCEDURE — 80053 COMPREHEN METABOLIC PANEL: CPT | Performed by: INTERNAL MEDICINE

## 2021-03-10 PROCEDURE — 85027 COMPLETE CBC AUTOMATED: CPT | Performed by: INTERNAL MEDICINE

## 2021-03-10 PROCEDURE — 250N000011 HC RX IP 250 OP 636: Performed by: INTERNAL MEDICINE

## 2021-03-10 PROCEDURE — 83615 LACTATE (LD) (LDH) ENZYME: CPT | Performed by: INTERNAL MEDICINE

## 2021-03-10 PROCEDURE — 99233 SBSQ HOSP IP/OBS HIGH 50: CPT | Performed by: INTERNAL MEDICINE

## 2021-03-10 PROCEDURE — 250N000013 HC RX MED GY IP 250 OP 250 PS 637: Performed by: STUDENT IN AN ORGANIZED HEALTH CARE EDUCATION/TRAINING PROGRAM

## 2021-03-10 PROCEDURE — 71046 X-RAY EXAM CHEST 2 VIEWS: CPT | Mod: 26 | Performed by: RADIOLOGY

## 2021-03-10 PROCEDURE — 99232 SBSQ HOSP IP/OBS MODERATE 35: CPT | Mod: GC | Performed by: INTERNAL MEDICINE

## 2021-03-10 RX ORDER — PANTOPRAZOLE SODIUM 40 MG/1
40 TABLET, DELAYED RELEASE ORAL
Status: DISCONTINUED | OUTPATIENT
Start: 2021-03-11 | End: 2021-03-16 | Stop reason: HOSPADM

## 2021-03-10 RX ADMIN — CELECOXIB 100 MG: 100 CAPSULE ORAL at 08:39

## 2021-03-10 RX ADMIN — RIVAROXABAN 20 MG: 20 TABLET, FILM COATED ORAL at 17:47

## 2021-03-10 RX ADMIN — Medication: at 22:39

## 2021-03-10 RX ADMIN — FLUTICASONE FUROATE AND VILANTEROL TRIFENATATE 1 PUFF: 200; 25 POWDER RESPIRATORY (INHALATION) at 08:39

## 2021-03-10 RX ADMIN — HYDROXYUREA 2000 MG: 500 CAPSULE ORAL at 21:01

## 2021-03-10 RX ADMIN — DEFEROXAMINE MESYLATE 3000 MG: 95 INJECTION, POWDER, LYOPHILIZED, FOR SOLUTION INTRAMUSCULAR; INTRAVENOUS; SUBCUTANEOUS at 21:01

## 2021-03-10 RX ADMIN — DOCUSATE SODIUM 50MG AND SENNOSIDES 8.6MG 2 TABLET: 8.6; 5 TABLET, FILM COATED ORAL at 08:38

## 2021-03-10 RX ADMIN — CELECOXIB 100 MG: 100 CAPSULE ORAL at 21:00

## 2021-03-10 RX ADMIN — ERYTHROMYCIN 1 G: 5 OINTMENT OPHTHALMIC at 21:01

## 2021-03-10 RX ADMIN — FOLIC ACID 1 MG: 1 TABLET ORAL at 08:38

## 2021-03-10 RX ADMIN — ACETAMINOPHEN 650 MG: 325 TABLET, FILM COATED ORAL at 05:04

## 2021-03-10 RX ADMIN — DOCUSATE SODIUM 50MG AND SENNOSIDES 8.6MG 1 TABLET: 8.6; 5 TABLET, FILM COATED ORAL at 08:00

## 2021-03-10 RX ADMIN — DOCUSATE SODIUM 100 MG: 100 CAPSULE, LIQUID FILLED ORAL at 08:39

## 2021-03-10 RX ADMIN — SERTRALINE HYDROCHLORIDE 200 MG: 100 TABLET ORAL at 08:37

## 2021-03-10 RX ADMIN — ERYTHROMYCIN 1 G: 5 OINTMENT OPHTHALMIC at 17:47

## 2021-03-10 RX ADMIN — ERYTHROMYCIN 1 G: 5 OINTMENT OPHTHALMIC at 08:39

## 2021-03-10 ASSESSMENT — ACTIVITIES OF DAILY LIVING (ADL)
ADLS_ACUITY_SCORE: 11
ADLS_ACUITY_SCORE: 11
ADLS_ACUITY_SCORE: 12
ADLS_ACUITY_SCORE: 11

## 2021-03-10 NOTE — PROGRESS NOTES
Hematology Daily Progress Note   Date of Service: 03/10/2021    Patient: Jennifer Cervantes  MRN: [id number]  Admission Date: 3/4/2021  Hospital Day # 6    Initial Reason for Consult: Sickle Cell pain crisis    Assessment & Plan:   Jennifer Cervantes is a 21 year old woman with a history of Sickle Cell Disease (HgbSS) c/b stroke (age 1) with residual RUE weakness, TIA 2014, acute chest (2019), iron overload due to transfusions as secondary prevention for stroke, and PE (dx 2/1/21) on Xarelto, who is admitted with uncomplicated sickle cell pain crisis.    #Sickle Cell pain crisis  Pt presented with worsening pain despite home medications. Hgb stable within baseline 6-8 with appropriate retic response. Tbili increased, consistent with pain crisis, but now downtrending. AST and ALT elevated and has been intermittently prior, now downtrending. Developed mild hypoxia with O2 sats to mid-80s overnight 3/6-3/7 and currently remains on 2L (sats 92-99%), although able to remove supplemental O2 and maintain oxygen saturaion >90%. No respiratory distress/cough and remains afebrile. Most recent CXR (3/8) without consolidations. No specific concern for acute chest syndrome at this time. Suspect the hypoxia may be in part related to mild hypoventilation given her intermittent drowsiness and opioid pain medication; also likely able to wean down today.    Pain remains prominent, but morphine PCA now helping. Initially had Q20 minute demand dosing, now changed to Q10 min with much more improvement in pain. Would continue with this regimen until we see continued, consistent improvement in pain.    Overall, even with her mild hypoxia, this appears to be a fairly uncomplicated pain crisis.      #Iron Overload  Iron overload due to chronic transfusions as secondary prevention for stroke. Ferritin >8400 on 3/5, up to 11,514 (3/8). Initiated Desferal this hospitalization (3/5). Case discussed with Dr. Duncan, who will continue to assess need for  "further inpatient therapy.      #PE (dx 2/1/21)  Continues on Xarelto with plan for 3 months of treatment.      Recommendations:  - Continue pain plan as is  - Tylenol PRN. Could consider scheduling this improving LFTs  - continue Celebrex, hold other NSAIDs while on Xarelto              - continue bowel regimen  - Encourage incentive spirometry; wean O2 as able  - Continue with home Xarelto  - Continue with Desferal (s/p 3 doses 3/5-3/7; repeating 3g daily x 3 doses 3/8-3/10, per Dr. Duncan).   - Continue folate 1g/day   - Continue PTA Hydrea  - holding ASA while on Xarelto  - IVFs as needed, pending PO intake  - Please obtain CBC with diff and retics daily  - Do NOT transfuse blood unless discussed with Hematology first  - Please page hematology if patient develops fever, worsening hypoxic respiratory failuire, or acute shortness of breath/cough/pleuritic pain.    Follow-up:   - pt currently has follow-up with primary Hematologist, Dr. Duncan, on 3/26  - Hematology team will determine if she needs to keep her currently scheduled daily infusion appointments 3/15-3/19 (for Desferal) pending hospital course.    Patient seen and staffed with attending physician, Dr. Villagran.    We will continue to follow this patient. Please don't hesitate to contact the Hematology fellow on-call with questions.    Isaac Longoria MD PhD  Heme/Onc/Transplant Fellow  Pgr #[phone]  ___________________________________________________    Interval History:    - Pain much improve after decreasing lock-out interval yesterday  - Remains on 2L NC with recent spO2 of 99%; able to remove NC without de-saturation  - Appetite modest, stable      Physical Exam:    /73 (BP Location: Right arm)   Pulse 90   Temp 98.2  F (36.8  C) (Oral)   Resp 18   Ht 1.626 m (5' 4\")   Wt 75 kg (165 lb 4.8 oz)   SpO2 99%   BMI 28.37 kg/m    Gen: Laying in bed, NAD. Mildly sleepy  HEENT: Eyes normal appearing today. Nasal cannula inp lace  CV: " Normal rate, regular   Pulm: Breathing comfortably on room air (had taken off O2 prior to provider entering).  Abd: Soft, NT/ND, no rebound/guarding.  Ext: Warm and well perfused. No lower extremity edema.   Skin: No lesions or rashes on exposed skin surfaces of limited exam.   Neuro: Alert, mildly sleepy; improved overall compared to prior.Answering questions appropriately.       Labs & Studies:     CMP  Recent Labs   Lab 03/10/21  0643 03/09/21  0648 03/08/21  0832 03/07/21  0826 03/06/21  0748     --  141 138 139   POTASSIUM 3.7  --  4.0 4.0 4.0   CHLORIDE 112*  --  112* 110* 110*   CO2 24  --  26 24 24   ANIONGAP 7  --  4 5 4   GLC 80  --  95 83 87   BUN 7  --  8 8 8   CR 0.56  --  0.61 0.54 0.51*   GFRESTIMATED >90  --  >90 >90 >90   GFRESTBLACK >90  --  >90 >90 >90   MICAH 8.5  --  8.5 8.6 8.6   PROTTOTAL 7.1 7.4 7.1 8.0 8.0   ALBUMIN 3.1* 3.2* 3.4 3.5 3.5   BILITOTAL 3.0* 4.2* 3.5* 2.8* 3.0*   ALKPHOS 71 81 75 74 73   * 132* 144* 136* 124*   * 139* 152* 153* 149*     CBC  Recent Labs   Lab 03/10/21  0643 03/09/21  0648 03/08/21  0832 03/07/21  0826   WBC 9.2 13.4* 13.2* 10.6   RBC 2.11* 2.22* 2.39* 2.54*   HGB 6.4* 6.4* 6.7* 7.0*   HCT 18.1* 18.3* 19.3* 21.0*   MCV 86 82 81 83   MCH 30.3 28.8 28.0 27.6   MCHC 35.4 35.0 34.7 33.3   RDW 33.1* 31.2* 28.3* 26.0*    387 468* 488*       Medications list for reference:  Current Facility-Administered Medications   Medication     acetaminophen (TYLENOL) tablet 650 mg     albuterol (PROAIR HFA/PROVENTIL HFA/VENTOLIN HFA) 108 (90 Base) MCG/ACT inhaler 2 puff     celecoxib (celeBREX) capsule 100 mg     deferoxamine (DESFERAL) 3,000 mg in sodium chloride 0.9 % 500 mL intermittent infusion     diphenhydrAMINE (BENADRYL) capsule 25-50 mg     docusate sodium (COLACE) capsule 100 mg     erythromycin (ROMYCIN) ophthalmic ointment     fluticasone-vilanterol (BREO ELLIPTA) 200-25 MCG/INH inhaler 1 puff     folic acid (FOLVITE) tablet 1 mg     hydroxyurea  (HYDREA) capsule 2,000 mg     influenza quadrivalent (PF) vacc (FLUZONE) injection 0.5 mL     Lidocaine (LIDOCARE) 4 % Patch 1-3 patch    And     lidocaine patch in PLACE     lidocaine (LMX4) cream     lidocaine 1 % 0.1-1 mL     melatonin tablet 1 mg     morphine  PCA 1 mg/mL OPIOID TOLERANT     ondansetron (ZOFRAN-ODT) ODT tab 4 mg    Or     ondansetron (ZOFRAN) injection 4 mg     Patient is already receiving anticoagulation with heparin, enoxaparin (LOVENOX), warfarin (COUMADIN)  or other anticoagulant medication     polyethylene glycol (MIRALAX) Packet 17 g     rivaroxaban ANTICOAGULANT (XARELTO) tablet 20 mg     senna-docusate (SENOKOT-S/PERICOLACE) 8.6-50 MG per tablet 1 tablet    Or     senna-docusate (SENOKOT-S/PERICOLACE) 8.6-50 MG per tablet 2 tablet     senna-docusate (SENOKOT-S/PERICOLACE) 8.6-50 MG per tablet 1 tablet    Or     senna-docusate (SENOKOT-S/PERICOLACE) 8.6-50 MG per tablet 2 tablet     sertraline (ZOLOFT) tablet 200 mg     sodium chloride (PF) 0.9% PF flush 3 mL     sodium chloride (PF) 0.9% PF flush 3 mL     sodium chloride 0.9% infusion

## 2021-03-10 NOTE — PROGRESS NOTES
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         Plan for Today:   -Continue PCA prn dose to 0.6mg Q10min from 1.5 Q20min. Ct 1mg/hr basal.   - monitor closely. D/W Hematology and hold off on PRBC transfusion  -Repeat chest x-ray, EKG. trial of PPI and GI cocktail      Sickle cell pain crisis  Hx iron overload   Hypoxic respiratory failure  Presented with 1 day worsening generalized pain, unable to be managed on PO regimen at home.  Hgb 8.3, %retic 14.9, absolute retic 443.7. Mild leukocytosis 13.3 per below. COVID neg. On chart review, has had two ED visits for pain in the last 1.5 weeks.  Last seen in Hematology clinic on 02/24/21 due to more frequent episodes of pain. On Oxycodone 15mg PRN, Celebrex PRN, and APAP PRN at home. Recently stopped OxyContin. Currently afebrile and stable on RA. Last episode of acute chest in 10/2019.        - Hematology consulted, recs appreciated  - Will avoid Toradol for additional pain control as pt on Rivaroxaban  - Celebrex scheduled for additional pain control  - Antiemetics w/ Zofran PRN   - Benadryl PRN   - Bowel regimen w/ docusate and senna   - Continue PTA Hydroxyurea  - Lidoderm patches PRN for back pain  - Daily CBC w/ diff, retics  - Holding Jadenu for now pending ferritin recheck per above  - Low threshold for CXR, EKG, if new fever or hypoxia   - No transfusion unless discussion w/ Heme; if increased O2 needs, call heme, to consider exchange transfusion       Elevated LFTs  Iron Overload:  Chelation per heme, Desferal 3g daily for 6 days  PE - V/Q scan on 1/31 initially read as negative, re-read on 2/1  "positive for acute PE.  Started on Rivaroxaban at that time.  Currently stable on room air.    - Continue Rivaroxaban 20mg every evening      Hx CVA (left MCA w/ R hemiparesis and RUE contracture) - Per chart review, had a stroke at age 1 and TIA in 2014.  Recently stopped ASA per Hematology recs.  Has RUE contracture at baseline and cognitive delay.    - Continue to hold ASA while on Rivaroxaban     Asthma - Stable.  Denies dyspnea and cough.   - Continue PTA Symbicort and PRN Albuterol       Depression, anxiety - Patient with non-depressed affect at present.  - Continue PTA Sertraline 200mg daily      Diet: Regular Diet Adult    DVT Prophylaxis: Xarelto  Lopez Catheter: not present  Code Status: Full Code         Disposition Plan   Expected discharge: 2 - 3 days, recommended to prior living arrangement once adequate pain management/ tolerating PO medications.  Entered: Brian Sheehan MD, MD 03/10/2021, 12:25 PM     The patient's care was discussed with the Bedside Nurse, Care Coordinator/, Patient and Hematology Consultant.    Brian Sheehan MD, MD  Hospitalist Service, [address]  Contact information available via [facility] Paging/Directory  Please see sign in/sign out for up to date coverage information  ______________________________________________________________________    Interval History   Complains of burning/abdominal chest pain .  States feels like heartburn and no relation of breath with deep breaths or movement.  Denies any new shortness of breath/palpitations.  Denies no new bowel/bladder problems.  Denies any bleeding    Data reviewed today: I reviewed all medications, new labs and imaging results over the last 24 hours. I personally reviewed the chest x-ray image(s) showing As below.    Physical Exam   /51 (BP Location: Left arm)   Pulse 84   Temp 98.1  F (36.7  C) (Oral)   Resp 18   Ht 1.626 m (5' 4\")   Wt 75 kg (165 lb 4.8 " oz)   SpO2 94%   BMI 28.37 kg/m    Gen- pleasant  lying in bed  HEENT- NAD, CONNIE  Neck- supple, no JVD elevation, no thyromegaly  CVS- I+II+ no m/r/g  RS- CTAB  Abdo- soft, no tenderness . No g/r/r   Ext- no edema   CNS- no focal signs .       Data    BMP  Recent Labs   Lab 03/10/21  0643 03/08/21  0832 03/07/21  0826 03/06/21  0748    141 138 139   POTASSIUM 3.7 4.0 4.0 4.0   CHLORIDE 112* 112* 110* 110*   MICAH 8.5 8.5 8.6 8.6   CO2 24 26 24 24   BUN 7 8 8 8   CR 0.56 0.61 0.54 0.51*   GLC 80 95 83 87     CBC  Recent Labs   Lab 03/10/21  0643 03/09/21  0648 03/08/21  0832 03/07/21  0826   WBC 9.2 13.4* 13.2* 10.6   RBC 2.11* 2.22* 2.39* 2.54*   HGB 6.4* 6.4* 6.7* 7.0*   HCT 18.1* 18.3* 19.3* 21.0*   MCV 86 82 81 83   MCH 30.3 28.8 28.0 27.6   MCHC 35.4 35.0 34.7 33.3   RDW 33.1* 31.2* 28.3* 26.0*    387 468* 488*     INRNo lab results found in last 7 days.  LFTs  Recent Labs   Lab 03/10/21  0643 03/09/21  0648 03/08/21  0832 03/07/21  0826   ALKPHOS 71 81 75 74   * 132* 144* 136*   * 139* 152* 153*   BILITOTAL 3.0* 4.2* 3.5* 2.8*   PROTTOTAL 7.1 7.4 7.1 8.0   ALBUMIN 3.1* 3.2* 3.4 3.5      PANCNo lab results found in last 7 days.    No results found for this or any previous visit (from the past 24 hour(s)).

## 2021-03-10 NOTE — PLAN OF CARE
Tachycardic at times, 2 L NC. OAVSS. Alert and oriented x 4. Port with PCA morphine and IVMF NS at 10 mL/hr. L PIV infusing deferoxamine. IV Dilaudid PRN for additional pain. Regular diet, tolerating well. no BM, passing flatus, voids spont, adequate amount. Up SBA or independently.

## 2021-03-10 NOTE — PLAN OF CARE
"/73 (BP Location: Right arm)   Pulse 90   Temp 98.2  F (36.8  C) (Oral)   Resp 18   Ht 1.626 m (5' 4\")   Wt 75 kg (165 lb 4.8 oz)   SpO2 99%   BMI 28.37 kg/m        [id number]  VSS on 2L NC. A+Ox4. Resting between cares. Regular diet. L PIV w/desferal infusing @41.7ml/hr.  Generalized pain treated with morphine PCA pump. Prn Tylenol given x1. Port TKO w/PCA. Voiding WOD, commode at bedside. Denies nausea, LBM 3/9 per pt. Up SBA/ad janett. Will continue to monitor and notify team with changes.   "

## 2021-03-11 LAB
ANISOCYTOSIS BLD QL SMEAR: ABNORMAL
BASOPHILS # BLD AUTO: 0.2 10E9/L (ref 0–0.2)
BASOPHILS NFR BLD AUTO: 1.9 %
DIFFERENTIAL METHOD BLD: ABNORMAL
EOSINOPHIL # BLD AUTO: 0.7 10E9/L (ref 0–0.7)
EOSINOPHIL NFR BLD AUTO: 8.7 %
ERYTHROCYTE [DISTWIDTH] IN BLOOD BY AUTOMATED COUNT: 34.2 % (ref 10–15)
HCT VFR BLD AUTO: 18.5 % (ref 35–47)
HGB BLD-MCNC: 6.4 G/DL (ref 11.7–15.7)
HYPOCHROMIA BLD QL: PRESENT
INTERPRETATION ECG - MUSE: NORMAL
LYMPHOCYTES # BLD AUTO: 2.5 10E9/L (ref 0.8–5.3)
LYMPHOCYTES NFR BLD AUTO: 30.1 %
MACROCYTES BLD QL SMEAR: PRESENT
MCH RBC QN AUTO: 31.2 PG (ref 26.5–33)
MCHC RBC AUTO-ENTMCNC: 34.6 G/DL (ref 31.5–36.5)
MCV RBC AUTO: 90 FL (ref 78–100)
MICROCYTES BLD QL SMEAR: PRESENT
MONOCYTES # BLD AUTO: 0 10E9/L (ref 0–1.3)
MONOCYTES NFR BLD AUTO: 0 %
NEUTROPHILS # BLD AUTO: 4.9 10E9/L (ref 1.6–8.3)
NEUTROPHILS NFR BLD AUTO: 59.3 %
NRBC # BLD AUTO: 8.9 10*3/UL
NRBC BLD AUTO-RTO: 108 /100
PLATELET # BLD AUTO: 392 10E9/L (ref 150–450)
PLATELET # BLD EST: ABNORMAL 10*3/UL
POIKILOCYTOSIS BLD QL SMEAR: ABNORMAL
POLYCHROMASIA BLD QL SMEAR: ABNORMAL
RBC # BLD AUTO: 2.05 10E12/L (ref 3.8–5.2)
RETICS # AUTO: 519.9 10E9/L (ref 25–95)
RETICS/RBC NFR AUTO: 25.4 % (ref 0.5–2)
SICKLE CELLS BLD QL SMEAR: ABNORMAL
TARGETS BLD QL SMEAR: ABNORMAL
WBC # BLD AUTO: 8.3 10E9/L (ref 4–11)

## 2021-03-11 PROCEDURE — 250N000011 HC RX IP 250 OP 636: Performed by: STUDENT IN AN ORGANIZED HEALTH CARE EDUCATION/TRAINING PROGRAM

## 2021-03-11 PROCEDURE — 250N000013 HC RX MED GY IP 250 OP 250 PS 637: Performed by: NURSE PRACTITIONER

## 2021-03-11 PROCEDURE — 250N000013 HC RX MED GY IP 250 OP 250 PS 637: Performed by: STUDENT IN AN ORGANIZED HEALTH CARE EDUCATION/TRAINING PROGRAM

## 2021-03-11 PROCEDURE — 120N000002 HC R&B MED SURG/OB UMMC

## 2021-03-11 PROCEDURE — 36592 COLLECT BLOOD FROM PICC: CPT | Performed by: PHYSICIAN ASSISTANT

## 2021-03-11 PROCEDURE — 258N000003 HC RX IP 258 OP 636: Performed by: STUDENT IN AN ORGANIZED HEALTH CARE EDUCATION/TRAINING PROGRAM

## 2021-03-11 PROCEDURE — 250N000013 HC RX MED GY IP 250 OP 250 PS 637: Performed by: INTERNAL MEDICINE

## 2021-03-11 PROCEDURE — 99232 SBSQ HOSP IP/OBS MODERATE 35: CPT | Performed by: INTERNAL MEDICINE

## 2021-03-11 PROCEDURE — 99233 SBSQ HOSP IP/OBS HIGH 50: CPT | Performed by: INTERNAL MEDICINE

## 2021-03-11 PROCEDURE — 250N000009 HC RX 250: Performed by: INTERNAL MEDICINE

## 2021-03-11 PROCEDURE — 85045 AUTOMATED RETICULOCYTE COUNT: CPT | Performed by: PHYSICIAN ASSISTANT

## 2021-03-11 PROCEDURE — 85025 COMPLETE CBC W/AUTO DIFF WBC: CPT | Performed by: PHYSICIAN ASSISTANT

## 2021-03-11 RX ADMIN — CELECOXIB 100 MG: 100 CAPSULE ORAL at 21:37

## 2021-03-11 RX ADMIN — FOLIC ACID 1 MG: 1 TABLET ORAL at 14:02

## 2021-03-11 RX ADMIN — FLUTICASONE FUROATE AND VILANTEROL TRIFENATATE 1 PUFF: 200; 25 POWDER RESPIRATORY (INHALATION) at 14:00

## 2021-03-11 RX ADMIN — HYDROXYUREA 2000 MG: 500 CAPSULE ORAL at 19:48

## 2021-03-11 RX ADMIN — PANTOPRAZOLE SODIUM 40 MG: 40 TABLET, DELAYED RELEASE ORAL at 14:02

## 2021-03-11 RX ADMIN — RIVAROXABAN 20 MG: 20 TABLET, FILM COATED ORAL at 17:00

## 2021-03-11 RX ADMIN — ERYTHROMYCIN: 5 OINTMENT OPHTHALMIC at 14:08

## 2021-03-11 RX ADMIN — SERTRALINE HYDROCHLORIDE 200 MG: 100 TABLET ORAL at 14:01

## 2021-03-11 RX ADMIN — CELECOXIB 100 MG: 100 CAPSULE ORAL at 14:03

## 2021-03-11 RX ADMIN — DEFEROXAMINE MESYLATE 3000 MG: 95 INJECTION, POWDER, LYOPHILIZED, FOR SOLUTION INTRAMUSCULAR; INTRAVENOUS; SUBCUTANEOUS at 19:51

## 2021-03-11 ASSESSMENT — ACTIVITIES OF DAILY LIVING (ADL)
ADLS_ACUITY_SCORE: 12

## 2021-03-11 NOTE — PLAN OF CARE
Pt had an uneventful day. Pain continues to be managed with Morphine PCA pump. Hgb consistent at 6.4, no transfusion at this time. Oxygen levels remain stable on room air between 93-95%.

## 2021-03-11 NOTE — PLAN OF CARE
VSS.  Stating good pain control with PCA overnight tonight.  Appeared to have slept majority of shift.  Up to BSC independently.  Voiding good amounts.  Repositioning self in bed throughout night.  Will complete day 5 of 5 of desferal this morning around 0900.  Continue to monitor pain.  Anticipate switching to oral pain medications in the next few days.

## 2021-03-11 NOTE — PROGRESS NOTES
[facility]    Medicine Progress Note - Hospitalist Service, Gold 8       Date of Admission:  3/4/2021  Assessment & Plan   Jennifer Cervantes is a 21 year old female with past medical history significant for sickle cell anemia, prior CVA c/b R hemiparesis and RUE contracture, recent acute PE (2/1/21), asthma, depression, and anxiety admitted for sickle cell pain crisis.         Plan for Today:   -Continue PCA prn dose to 0.6mg Q10min from 1.5 Q20min. Ct 1mg/hr basal.   - monitor closely. D/W Hematology and hold off on PRBC transfusion  -Repeat chest x-ray, EKG. trial of PPI and GI cocktail      Sickle cell pain crisis  Hx iron overload   Hypoxic respiratory failure  Presented with 1 day worsening generalized pain, unable to be managed on PO regimen at home.  Hgb 8.3, %retic 14.9, absolute retic 443.7. Mild leukocytosis 13.3 per below. COVID neg. On chart review, has had two ED visits for pain in the last 1.5 weeks.  Last seen in Hematology clinic on 02/24/21 due to more frequent episodes of pain. On Oxycodone 15mg PRN, Celebrex PRN, and APAP PRN at home. Recently stopped OxyContin. Currently afebrile and stable on RA. Last episode of acute chest in 10/2019.        - Hematology consulted, recs appreciated  - Will avoid Toradol for additional pain control as pt on Rivaroxaban  - Celebrex scheduled for additional pain control  - Antiemetics w/ Zofran PRN   - Benadryl PRN   - Bowel regimen w/ docusate and senna   - Continue PTA Hydroxyurea  - Lidoderm patches PRN for back pain  - Daily CBC w/ diff, retics  - Holding Jadenu for now pending ferritin recheck per above  - Low threshold for CXR, EKG, if new fever or hypoxia   - No transfusion unless discussion w/ Heme; if increased O2 needs, call heme, to consider exchange transfusion       Elevated LFTs  Iron Overload:  Chelation per heme, Desferal 3g daily for 6 days  PE - V/Q scan on 1/31 initially read as negative, re-read on 2/1  positive for acute PE.  Started on Rivaroxaban at that time.  Currently stable on room air.    - Continue Rivaroxaban 20mg every evening      Hx CVA (left MCA w/ R hemiparesis and RUE contracture) - Per chart review, had a stroke at age 1 and TIA in 2014.  Recently stopped ASA per Hematology recs.  Has RUE contracture at baseline and cognitive delay.    - Continue to hold ASA while on Rivaroxaban     Asthma - Stable.  Denies dyspnea and cough.   - Continue PTA Symbicort and PRN Albuterol       Depression, anxiety - Patient with non-depressed affect at present.  - Continue PTA Sertraline 200mg daily      Diet: Regular Diet Adult    DVT Prophylaxis: Xarelto  Lopez Catheter: not present  Code Status: Full Code         Disposition Plan   Expected discharge: 2 - 3 days, recommended to prior living arrangement once adequate pain management/ tolerating PO medications.  Entered: Gino Zamora 03/11/2021, 9:58 AM     The patient's care was discussed with the Bedside Nurse, Care Coordinator/, Patient and Hematology Consultant.    Gino Zamora, MS  Hospitalist Service, [address]  Contact information available via [facility] Paging/Directory  Please see sign in/sign out for up to date coverage information  ______________________________________________________________________    Interval History  3/11  Patient complains of lower back pain this morning, notes that she has had similar symptom before. Different from menstrual cramps. Denies any tingling or pain radiation to legs, no loss of bowel or bladder control. Denies any fever, chest pain, shortness of breath or constipation.    Patient was seen again around 1:50pm. She was feeling much better and just came back from shower. Her low back pain has improved significantly from this morning. Overall pain well controlled under current regimen.     Data reviewed today: I reviewed all medications, new labs and imaging  "results over the last 24 hours. I personally reviewed the chest x-ray image(s) showing As below.    Physical Exam   /61 (BP Location: Left arm)   Pulse 77   Temp 98.2  F (36.8  C) (Oral)   Resp 16   Ht 1.626 m (5' 4\")   Wt 75 kg (165 lb 4.8 oz)   SpO2 96%   BMI 28.37 kg/m    Gen- pleasant  lying in bed  HEENT- NAD, CONNIE  Neck- supple, no JVD elevation, no thyromegaly  CVS- I+II+ no m/r/g  RS- CTAB  Abdo- soft, no tenderness . No g/r/r   Ext- no edema   CNS- no focal signs .       Data    BMP  Recent Labs   Lab 03/10/21  0643 03/08/21  0832 03/07/21  0826 03/06/21  0748    141 138 139   POTASSIUM 3.7 4.0 4.0 4.0   CHLORIDE 112* 112* 110* 110*   MICAH 8.5 8.5 8.6 8.6   CO2 24 26 24 24   BUN 7 8 8 8   CR 0.56 0.61 0.54 0.51*   GLC 80 95 83 87     CBC  Recent Labs   Lab 03/11/21  0900 03/10/21  0643 03/09/21  0648 03/08/21  0832   WBC 8.3 9.2 13.4* 13.2*   RBC 2.05* 2.11* 2.22* 2.39*   HGB 6.4* 6.4* 6.4* 6.7*   HCT 18.5* 18.1* 18.3* 19.3*   MCV 90 86 82 81   MCH 31.2 30.3 28.8 28.0   MCHC 34.6 35.4 35.0 34.7   RDW 34.2* 33.1* 31.2* 28.3*    435 387 468*     INRNo lab results found in last 7 days.  LFTs  Recent Labs   Lab 03/10/21  0643 03/09/21  0648 03/08/21  0832 03/07/21  0826   ALKPHOS 71 81 75 74   * 132* 144* 136*   * 139* 152* 153*   BILITOTAL 3.0* 4.2* 3.5* 2.8*   PROTTOTAL 7.1 7.4 7.1 8.0   ALBUMIN 3.1* 3.2* 3.4 3.5      PANCNo lab results found in last 7 days.    Recent Results (from the past 24 hour(s))   XR Chest 2 Views    Narrative    Exam: XR CHEST 2 VW, 3/10/2021 12:17 PM    Indication: r/o any infiltrates . a/c chest syndrome    Comparison: Chest x-ray 3/8/2021    Findings:   Upright PA and lateral views chest are obtained. Left chest  Port-A-Cath in place with tip terminating at the cavoatrial junction.  Trachea is midline. Mediastinum is within normal limits. The  cardiopulmonary silhouette is not enlarged. Lungs are clear. There is  no pneumothorax or pleural " effusion. Cholecystectomy clips in the  right upper quadrant. No acute osseous abnormality.      Impression    Impression: No acute airspace disease.    I have personally reviewed the examination and initial interpretation  and I agree with the findings.    MARY JANE JUÁREZ MD

## 2021-03-11 NOTE — PROGRESS NOTES
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         Plan for Today:   -Continue PCA prn dose to 0.6mg Q10min from 1.5 Q20min. Ct 1mg/hr basal.   - monitor closely. D/W Hematology and hold off on PRBC transfusion  - May wean PCA tomorrow      Sickle cell pain crisis  Hx iron overload   Hypoxic respiratory failure  Presented with 1 day worsening generalized pain, unable to be managed on PO regimen at home.  Hgb 8.3, %retic 14.9, absolute retic 443.7. Mild leukocytosis 13.3 per below. COVID neg. On chart review, has had two ED visits for pain in the last 1.5 weeks.  Last seen in Hematology clinic on 02/24/21 due to more frequent episodes of pain. On Oxycodone 15mg PRN, Celebrex PRN, and APAP PRN at home. Recently stopped OxyContin. Currently afebrile and stable on RA. Last episode of acute chest in 10/2019.        - Hematology consulted, recs appreciated  - Will avoid Toradol for additional pain control as pt on Rivaroxaban  - Celebrex scheduled for additional pain control  - Antiemetics w/ Zofran PRN   - Benadryl PRN   - Bowel regimen w/ docusate and senna   - Continue PTA Hydroxyurea  - Lidoderm patches PRN for back pain  - Daily CBC w/ diff, retics  - Holding Jadenu for now pending ferritin recheck per above  - Low threshold for CXR, EKG, if new fever or hypoxia   - No transfusion unless discussion w/ Heme; if increased O2 needs, call heme, to consider exchange transfusion       Elevated LFTs  Iron Overload:  Chelation per heme, Desferal 3g daily for 6 days  PE - V/Q scan on 1/31 initially read as negative, re-read on 2/1 positive for acute PE.  Started  "on Rivaroxaban at that time.  Currently stable on room air.    - Continue Rivaroxaban 20mg every evening      Hx CVA (left MCA w/ R hemiparesis and RUE contracture) - Per chart review, had a stroke at age 1 and TIA in 2014.  Recently stopped ASA per Hematology recs.  Has RUE contracture at baseline and cognitive delay.    - Continue to hold ASA while on Rivaroxaban     Asthma - Stable.  Denies dyspnea and cough.   - Continue PTA Symbicort and PRN Albuterol       Depression, anxiety - Patient with non-depressed affect at present.  - Continue PTA Sertraline 200mg daily      Diet: Regular Diet Adult    DVT Prophylaxis: Xarelto  Lopez Catheter: not present  Code Status: Full Code         Disposition Plan   Expected discharge: 2 - 3 days, recommended to prior living arrangement once adequate pain management/ tolerating PO medications.  Entered: Brian Sheehan MD, MD 03/11/2021, 4:10 PM     The patient's care was discussed with the Bedside Nurse, Care Coordinator/, Patient and Hematology Consultant.    Brian Sheehan MD, MD  Hospitalist Service, [address]  Contact information available via [facility] Paging/Directory  Please see sign in/sign out for up to date coverage information  ______________________________________________________________________    Interval History   Feels pain is better controlled today, except pain in LBP. No tingling/ numbness  No new incontinence   Denies any new shortness of breath/palpitations.  Denies no new bowel/bladder problems.  Denies any bleeding    Data reviewed today: I reviewed all medications, new labs and imaging results over the last 24 hours. I personally reviewed no images or EKG's today.    Physical Exam   /80 (BP Location: Left arm)   Pulse 81   Temp 97.9  F (36.6  C) (Oral)   Resp 16   Ht 1.626 m (5' 4\")   Wt 75 kg (165 lb 4.8 oz)   SpO2 (!) 88%   BMI 28.37 kg/m    Gen- pleasant  lying in bed  HEENT- " NAD, CONNIE  Neck- supple, no JVD elevation, no thyromegaly  CVS- I+II+ no m/r/g  RS- CTAB  Abdo- soft, no tenderness . No g/r/r   Ext- no edema   CNS- no focal signs .       Data    BMP  Recent Labs   Lab 03/10/21  0643 03/08/21  0832 03/07/21  0826 03/06/21  0748    141 138 139   POTASSIUM 3.7 4.0 4.0 4.0   CHLORIDE 112* 112* 110* 110*   MICAH 8.5 8.5 8.6 8.6   CO2 24 26 24 24   BUN 7 8 8 8   CR 0.56 0.61 0.54 0.51*   GLC 80 95 83 87     CBC  Recent Labs   Lab 03/11/21  0900 03/10/21  0643 03/09/21  0648 03/08/21  0832   WBC 8.3 9.2 13.4* 13.2*   RBC 2.05* 2.11* 2.22* 2.39*   HGB 6.4* 6.4* 6.4* 6.7*   HCT 18.5* 18.1* 18.3* 19.3*   MCV 90 86 82 81   MCH 31.2 30.3 28.8 28.0   MCHC 34.6 35.4 35.0 34.7   RDW 34.2* 33.1* 31.2* 28.3*    435 387 468*     INRNo lab results found in last 7 days.  LFTs  Recent Labs   Lab 03/10/21  0643 03/09/21  0648 03/08/21  0832 03/07/21  0826   ALKPHOS 71 81 75 74   * 132* 144* 136*   * 139* 152* 153*   BILITOTAL 3.0* 4.2* 3.5* 2.8*   PROTTOTAL 7.1 7.4 7.1 8.0   ALBUMIN 3.1* 3.2* 3.4 3.5      PANCNo lab results found in last 7 days.    No results found for this or any previous visit (from the past 24 hour(s)).

## 2021-03-11 NOTE — PLAN OF CARE
Assumed care of pt at 1900. VSS on 2L NC. A+Ox4. Resting between cares. Regular diet. L PIV desferal infusing @41.7ml/hr.  Pain treated with morphine PCA pump. Port TKO w/PCA. Voiding, commode at bedside. Up ad janett in room. Continue POC.

## 2021-03-11 NOTE — PROGRESS NOTES
Hematology Daily Progress Note   Date of Service: 03/11/2021    Patient: Jennifer Cervantes  MRN: [id number]  Admission Date: 3/4/2021  Hospital Day # 7    Primary Hematologist: Dr. Duncan  Initial Reason for Consult: Sickle Cell pain crisis    Assessment & Plan:   Jennifer Cervantes is a 21 year old woman with a history of Sickle Cell Disease (HgbSS) c/b stroke (age 1) with residual RUE weakness, TIA 2014, acute chest (2019), iron overload due to transfusions as secondary prevention for stroke, and PE (dx 2/1/21) on Xarelto, who is admitted with uncomplicated sickle cell pain crisis.    #Sickle Cell pain crisis  Pt presented with worsening pain despite home medications. Hgb stable within baseline 6-8 with appropriate retic response. Tbili increased, consistent with pain crisis, but now downtrending. AST and ALT elevated and has been intermittently prior, now downtrending. Developed mild hypoxia with O2 sats to mid-80s overnight 3/6-3/7 and currently remains on 2L (sats 92-99%), although able to remove supplemental O2 and maintain oxygen saturaion >90%. No respiratory distress/cough and remains afebrile. Most recent CXR (3/8) without consolidations. No specific concern for acute chest syndrome at this time. Suspect the hypoxia may be in part related to mild hypoventilation given her intermittent drowsiness and opioid pain medication. Repeat CXR (3/10) remains negative. Hgb stable at 6.4 over last few days, retics remain elevated but starting to slowly downtrend.     Today (3/11), she continues with pain in her low back. No chest pain or pain in extremities. She is off of supplemental O2 at the time this provider sees pt. Denies SOB or difficulty breathing. She asks about possibly receiving a transfusion as she knows it would help her pain.      #Iron Overload  Iron overload due to chronic transfusions as secondary prevention for stroke. Ferritin >8400 on 3/5, up to 11,514 (3/8). Case discussed with Dr. Duncan, initiated  Desferal this hospitalization (3/5). She is now s/p 6 total doses (3/5-3/10).   - resume Desferal daily today (3/11), continue daily while inpatient     #PE (dx 2/1/21)  Continues on Xarelto with plan for 3 months of treatment.      Recommendations:  - for pain control:  - morphine PCA. If primary team able to start transition away from this, could start by stopping the basal rate.   - Tylenol PRN.   - continue Celebrex, hold other NSAIDs while on Xarelto              - continue bowel regimen  - Encourage incentive spirometry; wean O2 as able  - Continue with home Xarelto  - will continue Desferal daily while inpatient (Heme team ordered for you)   - Continue folate 1g/day   - Continue PTA Hydrea  - holding ASA while on Xarelto  - IVFs as needed, pending PO intake  - Please obtain CBC with diff and retics daily  - Do NOT transfuse blood unless discussed with Hematology first. Pt asking about this today. Dr. Villagran will discuss with Dr. Duncan (her primary Hematologist), but anticipate still holding off on transfusion given stability of Hgb and overall improving clinical status. ADDENDUM: Dr. Villagran d/w Dr. Duncan and he recommends against transfusion at this time. NO PRBC transfusion today.    - Please page hematology if patient develops fever, worsening hypoxic respiratory failuire, or acute shortness of breath/cough/pleuritic pain.    Follow-up:   - pt currently has follow-up with primary Hematologist, Dr. Duncan, on 3/26  - Hematology team will determine if she needs to keep her currently scheduled daily infusion appointments 3/15-3/19 (for Desferal) pending hospital course.     Patient seen and staffed with attending physician, Dr. Villagran.    We will continue to follow this patient. Please don't hesitate to contact us with questions.     Jennifer Obando PA-C  Heme/Onc  [phone] (pager)  ___________________________________________________    Interval History:    Pt reports ongoing low back pain. This is  "lingering but she hopes that by end of the day it will be feeling better. Denies chest pain or pain in extremities. She was sleeping with O2 by NC earlier but has taken this off after waking. No respiratory distress appreciated. Denies SOB or difficulty breathing. She asks about possibly receiving a transfusion as she knows it would help her pain. She does report that she is having some vaginal spotting, consistent with how her menstrual cycles are for her. Denies any heavy, continuous bleeding. Reports that she is receiving Depo Provera every 3 months and thinks she is coming due for her next shot. Per chart review, appears she is due for this in early-mid April 2021.    Unclear documentation of PCA totals yesterday (?missing day shift total vs if this is included with what is reported on evening shift) but appears to have used significantly less morphine compared to the several days prior.       Physical Exam:    /61 (BP Location: Left arm)   Pulse 77   Temp 98.2  F (36.8  C) (Oral)   Resp 16   Ht 1.626 m (5' 4\")   Wt 75 kg (165 lb 4.8 oz)   SpO2 96%   BMI 28.37 kg/m    Gen: Pleasant female seen lying in bed, NAD. Awake, alert, interactive.  HEENT: NC/AT. No supplemental O2 in place at time of assessment.   Pulm: Breathing comfortably on room air. CTA b/l.  CV: RRR  Abd: Soft, NT/ND, no rebound/guarding.  Ext: Baseline R hand contracture. No lower extremity edema.   Skin: No lesions or rashes on exposed skin surfaces of limited exam.   Neuro: Alert, answering questions appropriately. Asking appropriate questions about her care plan.       Labs & Studies:     CMP  Recent Labs   Lab 03/10/21  0643 03/09/21  0648 03/08/21  0832 03/07/21  0826 03/06/21  0748     --  141 138 139   POTASSIUM 3.7  --  4.0 4.0 4.0   CHLORIDE 112*  --  112* 110* 110*   CO2 24  --  26 24 24   ANIONGAP 7  --  4 5 4   GLC 80  --  95 83 87   BUN 7  --  8 8 8   CR 0.56  --  0.61 0.54 0.51*   GFRESTIMATED >90  --  >90 >90 >90 "   GFRESTBLACK >90  --  >90 >90 >90   MICAH 8.5  --  8.5 8.6 8.6   PROTTOTAL 7.1 7.4 7.1 8.0 8.0   ALBUMIN 3.1* 3.2* 3.4 3.5 3.5   BILITOTAL 3.0* 4.2* 3.5* 2.8* 3.0*   ALKPHOS 71 81 75 74 73   * 132* 144* 136* 124*   * 139* 152* 153* 149*     CBC  Recent Labs   Lab 03/11/21  0900 03/10/21  0643 03/09/21  0648 03/08/21  0832   WBC 8.3 9.2 13.4* 13.2*   RBC 2.05* 2.11* 2.22* 2.39*   HGB 6.4* 6.4* 6.4* 6.7*   HCT 18.5* 18.1* 18.3* 19.3*   MCV 90 86 82 81   MCH 31.2 30.3 28.8 28.0   MCHC 34.6 35.4 35.0 34.7   RDW 34.2* 33.1* 31.2* 28.3*    435 387 468*       Medications list for reference:  Current Facility-Administered Medications   Medication     acetaminophen (TYLENOL) tablet 650 mg     albuterol (PROAIR HFA/PROVENTIL HFA/VENTOLIN HFA) 108 (90 Base) MCG/ACT inhaler 2 puff     celecoxib (celeBREX) capsule 100 mg     diphenhydrAMINE (BENADRYL) capsule 25-50 mg     docusate sodium (COLACE) capsule 100 mg     erythromycin (ROMYCIN) ophthalmic ointment     fluticasone-vilanterol (BREO ELLIPTA) 200-25 MCG/INH inhaler 1 puff     folic acid (FOLVITE) tablet 1 mg     hydroxyurea (HYDREA) capsule 2,000 mg     influenza quadrivalent (PF) vacc (FLUZONE) injection 0.5 mL     Lidocaine (LIDOCARE) 4 % Patch 1-3 patch    And     lidocaine patch in PLACE     lidocaine (LMX4) cream     lidocaine (XYLOCAINE) 2 % 15 mL, alum & mag hydroxide-simethicone (MAALOX) 15 mL GI Cocktail     lidocaine 1 % 0.1-1 mL     melatonin tablet 1 mg     morphine  PCA 1 mg/mL OPIOID TOLERANT     ondansetron (ZOFRAN-ODT) ODT tab 4 mg    Or     ondansetron (ZOFRAN) injection 4 mg     pantoprazole (PROTONIX) EC tablet 40 mg     Patient is already receiving anticoagulation with heparin, enoxaparin (LOVENOX), warfarin (COUMADIN)  or other anticoagulant medication     polyethylene glycol (MIRALAX) Packet 17 g     rivaroxaban ANTICOAGULANT (XARELTO) tablet 20 mg     senna-docusate (SENOKOT-S/PERICOLACE) 8.6-50 MG per tablet 1 tablet    Or      senna-docusate (SENOKOT-S/PERICOLACE) 8.6-50 MG per tablet 2 tablet     senna-docusate (SENOKOT-S/PERICOLACE) 8.6-50 MG per tablet 1 tablet    Or     senna-docusate (SENOKOT-S/PERICOLACE) 8.6-50 MG per tablet 2 tablet     sertraline (ZOLOFT) tablet 200 mg     sodium chloride (PF) 0.9% PF flush 3 mL     sodium chloride (PF) 0.9% PF flush 3 mL     sodium chloride 0.9% infusion

## 2021-03-11 NOTE — PHARMACY-ADMISSION MEDICATION HISTORY
Admission medication history interview status for the 3/4/2021 admission is complete. See Epic admission navigator for allergy information, pharmacy, prior to admission medications and immunization status.     Medication history interview sources:  recent pharmacist med history note, dispense records     Changes made to PTA medication list (reason)  Added: n/a  Deleted: n/a  Changed: n/a    Additional medication history information (including reliability of information, actions taken by pharmacist):None    Prior to Admission medications    Medication Sig Last Dose Taking? Auth Provider   acetaminophen (TYLENOL) 325 MG tablet Take 2 tablets (650 mg) by mouth every 6 hours as needed for mild pain  Patient taking differently: Take 325-650 mg by mouth every 6 hours as needed for mild pain    Eric Duncan MD   albuterol (PROAIR HFA/PROVENTIL HFA/VENTOLIN HFA) 108 (90 Base) MCG/ACT inhaler Inhale 2 puffs into the lungs every 6 hours as needed for shortness of breath / dyspnea or wheezing   Adnrei Machado PA   albuterol (PROVENTIL) (2.5 MG/3ML) 0.083% neb solution Take 1 vial (2.5 mg) by nebulization every 6 hours as needed for shortness of breath / dyspnea or wheezing   Andrei Machado PA   aspirin (ASPIRIN) 81 MG EC tablet Take 1 tablet (81 mg) by mouth daily . HOLD while on Xarelto   Andrei Machado PA   budesonide-formoterol (SYMBICORT) 160-4.5 MCG/ACT Inhaler Inhale 2 puffs into the lungs 2 times daily   Andrei Machado PA   celecoxib (CELEBREX) 100 MG capsule Take 1 capsule (100 mg) by mouth 2 times daily   Eric Duncan MD   diphenhydrAMINE (BENADRYL) 25 MG capsule Take 1-2 capsules (25-50 mg) by mouth nightly as needed for sleep   Andrei Machado PA   EPINEPHrine (ANY BX GENERIC EQUIV) 0.3 MG/0.3ML injection 2-pack Inject 0.3 mLs (0.3 mg) into the muscle as needed for anaphylaxis   Andrei Machado PA   Hydroxyurea 1000 MG TABS Take 2,000 mg by mouth daily  Patient  taking differently: Take 2,000 mg by mouth every evening    Amalia Danielle PA-C   JADENU 360 MG tablet Take 4 tablets (1,440 mg) by mouth daily  Patient taking differently: Take 1,440 mg by mouth every evening    Eric Duncan MD   medroxyPROGESTERone (DEPO-PROVERA) 150 MG/ML IM injection Inject 150 mg into the muscle   Reported, Patient   naloxone (NARCAN) 4 MG/0.1ML nasal spray Spray 1 spray (4 mg) into one nostril alternating nostrils once as needed for opioid reversal every 2-3 minutes until assistance arrives   Eric Duncan MD   ondansetron (ZOFRAN) 8 MG tablet    Reported, Patient   oxyCODONE IR (ROXICODONE) 15 MG tablet Take 1 tablet (15 mg) by mouth every 6 hours as needed for severe pain   Andrei Machado PA   rivaroxaban ANTICOAGULANT (XARELTO) 20 MG TABS tablet Take 1 tablet (20 mg) by mouth daily (with dinner)   Pedro Tinajero DO   sertraline (ZOLOFT) 100 MG tablet Take 2 tablets (200 mg) by mouth daily   Andrei Machado PA     Medication history completed by: Corry Rojas, Franco

## 2021-03-12 LAB
ANION GAP SERPL CALCULATED.3IONS-SCNC: 5 MMOL/L (ref 3–14)
ANISOCYTOSIS BLD QL SMEAR: ABNORMAL
BASOPHILS # BLD AUTO: 0.1 10E9/L (ref 0–0.2)
BASOPHILS NFR BLD AUTO: 2 %
BUN SERPL-MCNC: 7 MG/DL (ref 7–30)
CALCIUM SERPL-MCNC: 8.4 MG/DL (ref 8.5–10.1)
CHLORIDE SERPL-SCNC: 113 MMOL/L (ref 94–109)
CO2 SERPL-SCNC: 24 MMOL/L (ref 20–32)
CREAT SERPL-MCNC: 0.51 MG/DL (ref 0.52–1.04)
DIFFERENTIAL METHOD BLD: ABNORMAL
EOSINOPHIL # BLD AUTO: 0.5 10E9/L (ref 0–0.7)
EOSINOPHIL NFR BLD AUTO: 7.9 %
ERYTHROCYTE [DISTWIDTH] IN BLOOD BY AUTOMATED COUNT: 36.5 % (ref 10–15)
GFR SERPL CREATININE-BSD FRML MDRD: >90 ML/MIN/{1.73_M2}
GLUCOSE SERPL-MCNC: 83 MG/DL (ref 70–99)
HCT VFR BLD AUTO: 18.5 % (ref 35–47)
HGB BLD-MCNC: 6.3 G/DL (ref 11.7–15.7)
LABORATORY COMMENT REPORT: NORMAL
LYMPHOCYTES # BLD AUTO: 1.5 10E9/L (ref 0.8–5.3)
LYMPHOCYTES NFR BLD AUTO: 24.3 %
MCH RBC QN AUTO: 32 PG (ref 26.5–33)
MCHC RBC AUTO-ENTMCNC: 34.1 G/DL (ref 31.5–36.5)
MCV RBC AUTO: 94 FL (ref 78–100)
MONOCYTES # BLD AUTO: 0.1 10E9/L (ref 0–1.3)
MONOCYTES NFR BLD AUTO: 1 %
NEUTROPHILS # BLD AUTO: 4.1 10E9/L (ref 1.6–8.3)
NEUTROPHILS NFR BLD AUTO: 64.8 %
NRBC # BLD AUTO: 7.2 10*3/UL
NRBC BLD AUTO-RTO: 114 /100
OVALOCYTES BLD QL SMEAR: SLIGHT
PLATELET # BLD AUTO: 375 10E9/L (ref 150–450)
POIKILOCYTOSIS BLD QL SMEAR: ABNORMAL
POLYCHROMASIA BLD QL SMEAR: SLIGHT
POTASSIUM SERPL-SCNC: 3.4 MMOL/L (ref 3.4–5.3)
RBC # BLD AUTO: 1.97 10E12/L (ref 3.8–5.2)
RETICS # AUTO: 419.6 10E9/L (ref 25–95)
RETICS/RBC NFR AUTO: 21.3 % (ref 0.5–2)
SARS-COV-2 RNA RESP QL NAA+PROBE: NEGATIVE
SICKLE CELLS BLD QL SMEAR: ABNORMAL
SODIUM SERPL-SCNC: 142 MMOL/L (ref 133–144)
SPECIMEN SOURCE: NORMAL
TARGETS BLD QL SMEAR: ABNORMAL
WBC # BLD AUTO: 6.3 10E9/L (ref 4–11)

## 2021-03-12 PROCEDURE — U0005 INFEC AGEN DETEC AMPLI PROBE: HCPCS | Performed by: INTERNAL MEDICINE

## 2021-03-12 PROCEDURE — 258N000003 HC RX IP 258 OP 636: Performed by: STUDENT IN AN ORGANIZED HEALTH CARE EDUCATION/TRAINING PROGRAM

## 2021-03-12 PROCEDURE — 80048 BASIC METABOLIC PNL TOTAL CA: CPT | Performed by: PHYSICIAN ASSISTANT

## 2021-03-12 PROCEDURE — 250N000011 HC RX IP 250 OP 636: Performed by: STUDENT IN AN ORGANIZED HEALTH CARE EDUCATION/TRAINING PROGRAM

## 2021-03-12 PROCEDURE — 250N000011 HC RX IP 250 OP 636: Performed by: INTERNAL MEDICINE

## 2021-03-12 PROCEDURE — 120N000002 HC R&B MED SURG/OB UMMC

## 2021-03-12 PROCEDURE — 85025 COMPLETE CBC W/AUTO DIFF WBC: CPT | Performed by: PHYSICIAN ASSISTANT

## 2021-03-12 PROCEDURE — 999N000127 HC STATISTIC PERIPHERAL IV START W US GUIDANCE

## 2021-03-12 PROCEDURE — 250N000013 HC RX MED GY IP 250 OP 250 PS 637: Performed by: INTERNAL MEDICINE

## 2021-03-12 PROCEDURE — 99232 SBSQ HOSP IP/OBS MODERATE 35: CPT | Performed by: INTERNAL MEDICINE

## 2021-03-12 PROCEDURE — 99233 SBSQ HOSP IP/OBS HIGH 50: CPT | Performed by: INTERNAL MEDICINE

## 2021-03-12 PROCEDURE — U0003 INFECTIOUS AGENT DETECTION BY NUCLEIC ACID (DNA OR RNA); SEVERE ACUTE RESPIRATORY SYNDROME CORONAVIRUS 2 (SARS-COV-2) (CORONAVIRUS DISEASE [COVID-19]), AMPLIFIED PROBE TECHNIQUE, MAKING USE OF HIGH THROUGHPUT TECHNOLOGIES AS DESCRIBED BY CMS-2020-01-R: HCPCS | Performed by: INTERNAL MEDICINE

## 2021-03-12 PROCEDURE — 85045 AUTOMATED RETICULOCYTE COUNT: CPT | Performed by: PHYSICIAN ASSISTANT

## 2021-03-12 PROCEDURE — 250N000013 HC RX MED GY IP 250 OP 250 PS 637: Performed by: NURSE PRACTITIONER

## 2021-03-12 PROCEDURE — 36592 COLLECT BLOOD FROM PICC: CPT | Performed by: PHYSICIAN ASSISTANT

## 2021-03-12 PROCEDURE — 250N000013 HC RX MED GY IP 250 OP 250 PS 637: Performed by: STUDENT IN AN ORGANIZED HEALTH CARE EDUCATION/TRAINING PROGRAM

## 2021-03-12 RX ORDER — MORPHINE SULFATE 2 MG/ML
0.5 INJECTION, SOLUTION INTRAMUSCULAR; INTRAVENOUS
Status: DISCONTINUED | OUTPATIENT
Start: 2021-03-12 | End: 2021-03-12

## 2021-03-12 RX ADMIN — PANTOPRAZOLE SODIUM 40 MG: 40 TABLET, DELAYED RELEASE ORAL at 12:53

## 2021-03-12 RX ADMIN — CELECOXIB 100 MG: 100 CAPSULE ORAL at 12:53

## 2021-03-12 RX ADMIN — DEFEROXAMINE MESYLATE 3000 MG: 95 INJECTION, POWDER, LYOPHILIZED, FOR SOLUTION INTRAMUSCULAR; INTRAVENOUS; SUBCUTANEOUS at 16:10

## 2021-03-12 RX ADMIN — OXYCODONE HYDROCHLORIDE 15 MG: 5 TABLET ORAL at 12:52

## 2021-03-12 RX ADMIN — FOLIC ACID 1 MG: 1 TABLET ORAL at 12:53

## 2021-03-12 RX ADMIN — FLUTICASONE FUROATE AND VILANTEROL TRIFENATATE 1 PUFF: 200; 25 POWDER RESPIRATORY (INHALATION) at 12:54

## 2021-03-12 RX ADMIN — HYDROXYUREA 2000 MG: 500 CAPSULE ORAL at 21:09

## 2021-03-12 RX ADMIN — Medication: at 19:00

## 2021-03-12 RX ADMIN — OXYCODONE HYDROCHLORIDE 15 MG: 5 TABLET ORAL at 18:56

## 2021-03-12 RX ADMIN — CELECOXIB 100 MG: 100 CAPSULE ORAL at 21:09

## 2021-03-12 RX ADMIN — RIVAROXABAN 20 MG: 20 TABLET, FILM COATED ORAL at 16:09

## 2021-03-12 RX ADMIN — SERTRALINE HYDROCHLORIDE 200 MG: 100 TABLET ORAL at 12:53

## 2021-03-12 ASSESSMENT — ACTIVITIES OF DAILY LIVING (ADL)
ADLS_ACUITY_SCORE: 12

## 2021-03-12 NOTE — PROGRESS NOTES
Clinical Nutrition Services- Brief Note    Reviewed nutrition risk factors due to LOS. Pt is tolerating a Regular diet, eating well per nursing documentation (mostly fair/good appetite and eating mostly % of meals documented in flowsheets). No nutrition issues identified at this time. RD will continue to follow per nutrition protocol.  Urszula Myles RD, LD  7C RD pager: [phone]

## 2021-03-12 NOTE — PLAN OF CARE
VSS. Pt getting up independently to commode. Voiding adequately. Had a BM today. Rating pain 7-10 this morning. Continuous PCA dose discontinued today, and Oxycodone ordered q 6 hrs PRN. Reg diet, not much of an appetite. Desferal infusion running into PIV. Refused bed bath today. Asymptomatic covid swab collected and resulted negative. Continue to monitor and with POC.

## 2021-03-12 NOTE — PROGRESS NOTES
Pt AOx4. Pain managed with Morphine PCA. Pt up independent to bedside commode throughout the night. Oxygen levels remain stable on RA, hgb will be rechecked this am. Pt denies any dizziness or lightheadedness. Pt PIV fell out overnight so there was a delay in her Desferal infusion.

## 2021-03-12 NOTE — PROGRESS NOTES
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         Plan for Today:   -will wean PCA from prn dose to 0.6mg Q10min to q. 20 minutes as needed .  Discontinue basal rate and resume home dose of oxycodone.   - monitor closely.  Hemoglobin continues to be low and continue to hold transfusion until recommended by hematology   -Likely discharge tomorrow      Sickle cell pain crisis  Hx iron overload   Hypoxic respiratory failure  Presented with 1 day worsening generalized pain, unable to be managed on PO regimen at home.  Hgb 8.3, %retic 14.9, absolute retic 443.7. Mild leukocytosis 13.3 per below. COVID neg. On chart review, has had two ED visits for pain in the last 1.5 weeks.  Last seen in Hematology clinic on 02/24/21 due to more frequent episodes of pain. On Oxycodone 15mg PRN, Celebrex PRN, and APAP PRN at home. Recently stopped OxyContin. Currently afebrile and stable on RA. Last episode of acute chest in 10/2019.        - Hematology consulted, recs appreciated  - Will avoid Toradol for additional pain control as pt on Rivaroxaban  - Celebrex scheduled for additional pain control  - Antiemetics w/ Zofran PRN   - Benadryl PRN   - Bowel regimen w/ docusate and senna   - Continue PTA Hydroxyurea  - Lidoderm patches PRN for back pain  - Daily CBC w/ diff, retics  - Holding Jadenu for now pending ferritin recheck per above  - Low threshold for CXR, EKG, if new fever or hypoxia   - No transfusion unless discussion w/ Heme; if increased O2 needs, call heme, to consider exchange transfusion       Elevated LFTs  Iron Overload:  Chelation per heme, Desferal 3g  "daily for 6 days  PE - V/Q scan on 1/31 initially read as negative, re-read on 2/1 positive for acute PE.  Started on Rivaroxaban at that time.  Currently stable on room air.    - Continue Rivaroxaban 20mg every evening      Hx CVA (left MCA w/ R hemiparesis and RUE contracture) - Per chart review, had a stroke at age 1 and TIA in 2014.  Recently stopped ASA per Hematology recs.  Has RUE contracture at baseline and cognitive delay.    - Continue to hold ASA while on Rivaroxaban     Asthma - Stable.  Denies dyspnea and cough.   - Continue PTA Symbicort and PRN Albuterol       Depression, anxiety - Patient with non-depressed affect at present.  - Continue PTA Sertraline 200mg daily      Diet: Regular Diet Adult    DVT Prophylaxis: Xarelto  Lopez Catheter: not present  Code Status: Full Code         Disposition Plan   Expected discharge: Tomorrow, recommended to prior living arrangement once adequate pain management/ tolerating PO medications.  Entered: Brian Sheehan MD, MD 03/12/2021, 1:22 PM     The patient's care was discussed with the Bedside Nurse, Care Coordinator/, Patient and Hematology Consultant.    Brian Sheehan MD, MD  Hospitalist Service, [address]  Contact information available via [facility] Paging/Directory  Please see sign in/sign out for up to date coverage information  ______________________________________________________________________    Interval History   Feels pain is much better controlled today.  Denies any new shortness of breath/palpitations.  Denies no new bowel/bladder problems.  Denies any bleeding    Data reviewed today: I reviewed all medications, new labs and imaging results over the last 24 hours. I personally reviewed no images or EKG's today.    Physical Exam   /49 (BP Location: Left arm)   Pulse 81   Temp 96.8  F (36  C) (Oral)   Resp 18   Ht 1.626 m (5' 4\")   Wt 75 kg (165 lb 4.8 oz)   SpO2 92%   BMI " 28.37 kg/m    Gen- pleasant  lying in bed  HEENT- NAD, CONNIE  Neck- supple, no JVD elevation, no thyromegaly  CVS- I+II+ no m/r/g  RS- CTAB  Abdo- soft, no tenderness . No g/r/r   Ext- no edema   CNS- no focal signs .       Data    BMP  Recent Labs   Lab 03/12/21  0745 03/10/21  0643 03/08/21  0832 03/07/21  0826    143 141 138   POTASSIUM 3.4 3.7 4.0 4.0   CHLORIDE 113* 112* 112* 110*   MICAH 8.4* 8.5 8.5 8.6   CO2 24 24 26 24   BUN 7 7 8 8   CR 0.51* 0.56 0.61 0.54   GLC 83 80 95 83     CBC  Recent Labs   Lab 03/12/21  0745 03/11/21  0900 03/10/21  0643 03/09/21  0648   WBC 6.3 8.3 9.2 13.4*   RBC 1.97* 2.05* 2.11* 2.22*   HGB 6.3* 6.4* 6.4* 6.4*   HCT 18.5* 18.5* 18.1* 18.3*   MCV 94 90 86 82   MCH 32.0 31.2 30.3 28.8   MCHC 34.1 34.6 35.4 35.0   RDW 36.5* 34.2* 33.1* 31.2*    392 435 387     INRNo lab results found in last 7 days.  LFTs  Recent Labs   Lab 03/10/21  0643 03/09/21  0648 03/08/21  0832 03/07/21  0826   ALKPHOS 71 81 75 74   * 132* 144* 136*   * 139* 152* 153*   BILITOTAL 3.0* 4.2* 3.5* 2.8*   PROTTOTAL 7.1 7.4 7.1 8.0   ALBUMIN 3.1* 3.2* 3.4 3.5      PANCNo lab results found in last 7 days.    No results found for this or any previous visit (from the past 24 hour(s)).

## 2021-03-12 NOTE — PROGRESS NOTES
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         Plan for Today:   - titrate PCA prn dose to 0.6mg Q10min as tolerated  - discontinue basal rate, plan to start oral PTA home dose  -start 0.5 mg Morphine injection every hour PRN  - Monitor Hgb closely, 3/12  (Hgb 6.3)Per Hematology recommendation holding off on PRBC transfusion        Sickle cell pain crisis  Hx iron overload   Hypoxic respiratory failure  Presented with 1 day worsening generalized pain, unable to be managed on PO regimen at home.  Hgb 8.3, %retic 14.9, absolute retic 443.7. Mild leukocytosis 13.3 per below. COVID neg. On chart review, has had two ED visits for pain in the last 1.5 weeks.  Last seen in Hematology clinic on 02/24/21 due to more frequent episodes of pain. On Oxycodone 15mg PRN, Celebrex PRN, and APAP PRN at home. Recently stopped OxyContin. Currently afebrile and stable on RA. Last episode of acute chest in 10/2019.        - Hematology consulted, recs appreciated  - Will avoid Toradol for additional pain control as pt on Rivaroxaban  - Celebrex scheduled for additional pain control  - Antiemetics w/ Zofran PRN   - Benadryl PRN   - Bowel regimen w/ docusate and senna   - Continue PTA Hydroxyurea  - Lidoderm patches PRN for back pain  - Daily CBC w/ diff, retics  - Holding Jadenu for now pending ferritin recheck per above  - Low threshold for CXR, EKG, if new fever or hypoxia   - No transfusion unless discussion w/ Heme; if increased O2 needs, call heme, to consider exchange transfusion       Elevated LFTs  Iron Overload:  Chelation per heme, Desferal 3g daily for 6  "days  PE - V/Q scan on 1/31 initially read as negative, re-read on 2/1 positive for acute PE.  Started on Rivaroxaban at that time.  Currently stable on room air.    - Continue Rivaroxaban 20mg every evening      Hx CVA (left MCA w/ R hemiparesis and RUE contracture) - Per chart review, had a stroke at age 1 and TIA in 2014.  Recently stopped ASA per Hematology recs.  Has RUE contracture at baseline and cognitive delay.    - Continue to hold ASA while on Rivaroxaban     Asthma - Stable.  Denies dyspnea and cough.   - Continue PTA Symbicort and PRN Albuterol       Depression, anxiety - Patient with non-depressed affect at present.  - Continue PTA Sertraline 200mg daily      Diet: Regular Diet Adult    DVT Prophylaxis: Xarelto  Lopez Catheter: not present  Code Status: Full Code         Disposition Plan   Expected discharge: Tomorrow, recommended to prior living arrangement once adequate pain management/ tolerating PO medications.  Entered: Gino Zamora 03/12/2021, 1:35 PM     The patient's care was discussed with the Bedside Nurse, Care Coordinator/, Patient and Hematology Consultant.    Gino Zamora  Hospitalist Service, [address]  Contact information available via [facility] Paging/Directory  Please see sign in/sign out for up to date coverage information  ______________________________________________________________________    Interval History    Patient is feeling much better today, Lower back pain resolved. She denies any symptoms of fever, chest pain, or shortness of breath.     Data reviewed today: I reviewed all medications, new labs and imaging results over the last 24 hours. I personally reviewed no images or EKG's today.    Physical Exam   /49 (BP Location: Left arm)   Pulse 81   Temp 96.8  F (36  C) (Oral)   Resp 18   Ht 1.626 m (5' 4\")   Wt 75 kg (165 lb 4.8 oz)   SpO2 95%   BMI 28.37 kg/m    Gen- pleasant  lying in " bed in no acute distress  HEENT- NAD, CONNIE  Neck- supple, no JVD elevation, no thyromegaly  CVS- I+II+ no m/r/g  RS- CTAB  Abdo- soft, no tenderness . No g/r/r   Ext- no edema   CNS- no focal signs .       Data    BMP  Recent Labs   Lab 03/12/21  0745 03/10/21  0643 03/08/21  0832 03/07/21  0826    143 141 138   POTASSIUM 3.4 3.7 4.0 4.0   CHLORIDE 113* 112* 112* 110*   MICAH 8.4* 8.5 8.5 8.6   CO2 24 24 26 24   BUN 7 7 8 8   CR 0.51* 0.56 0.61 0.54   GLC 83 80 95 83     CBC  Recent Labs   Lab 03/12/21  0745 03/11/21  0900 03/10/21  0643 03/09/21  0648   WBC 6.3 8.3 9.2 13.4*   RBC 1.97* 2.05* 2.11* 2.22*   HGB 6.3* 6.4* 6.4* 6.4*   HCT 18.5* 18.5* 18.1* 18.3*   MCV 94 90 86 82   MCH 32.0 31.2 30.3 28.8   MCHC 34.1 34.6 35.4 35.0   RDW 36.5* 34.2* 33.1* 31.2*    392 435 387     INRNo lab results found in last 7 days.  LFTs  Recent Labs   Lab 03/10/21  0643 03/09/21  0648 03/08/21  0832 03/07/21  0826   ALKPHOS 71 81 75 74   * 132* 144* 136*   * 139* 152* 153*   BILITOTAL 3.0* 4.2* 3.5* 2.8*   PROTTOTAL 7.1 7.4 7.1 8.0   ALBUMIN 3.1* 3.2* 3.4 3.5      PANCNo lab results found in last 7 days.    No results found for this or any previous visit (from the past 24 hour(s)).

## 2021-03-12 NOTE — PROVIDER NOTIFICATION
Notified MD at 0817 regarding critical results read back.      Spoke with: Dr. Sheehan    Orders were not obtained.    Comments: Hemoglobin 6.3 with morning labs, down from 6.4 yesterday. MD notified, no new orders at this time. Will continue to monitor.

## 2021-03-13 LAB
ANION GAP SERPL CALCULATED.3IONS-SCNC: 7 MMOL/L (ref 3–14)
ANISOCYTOSIS BLD QL SMEAR: ABNORMAL
BASOPHILS # BLD AUTO: 0 10E9/L (ref 0–0.2)
BASOPHILS NFR BLD AUTO: 0 %
BUN SERPL-MCNC: 10 MG/DL (ref 7–30)
CALCIUM SERPL-MCNC: 8.4 MG/DL (ref 8.5–10.1)
CHLORIDE SERPL-SCNC: 108 MMOL/L (ref 94–109)
CO2 SERPL-SCNC: 24 MMOL/L (ref 20–32)
CREAT SERPL-MCNC: 0.59 MG/DL (ref 0.52–1.04)
DIFFERENTIAL METHOD BLD: ABNORMAL
EOSINOPHIL # BLD AUTO: 0.6 10E9/L (ref 0–0.7)
EOSINOPHIL NFR BLD AUTO: 10.7 %
ERYTHROCYTE [DISTWIDTH] IN BLOOD BY AUTOMATED COUNT: ABNORMAL % (ref 10–15)
FERRITIN SERPL-MCNC: 6754 NG/ML (ref 12–150)
GFR SERPL CREATININE-BSD FRML MDRD: >90 ML/MIN/{1.73_M2}
GLUCOSE SERPL-MCNC: 81 MG/DL (ref 70–99)
HCT VFR BLD AUTO: 19 % (ref 35–47)
HGB BLD-MCNC: 6.5 G/DL (ref 11.7–15.7)
LYMPHOCYTES # BLD AUTO: 1.9 10E9/L (ref 0.8–5.3)
LYMPHOCYTES NFR BLD AUTO: 32 %
MACROCYTES BLD QL SMEAR: PRESENT
MCH RBC QN AUTO: 33.3 PG (ref 26.5–33)
MCHC RBC AUTO-ENTMCNC: 34.2 G/DL (ref 31.5–36.5)
MCV RBC AUTO: 97 FL (ref 78–100)
MONOCYTES # BLD AUTO: 0.2 10E9/L (ref 0–1.3)
MONOCYTES NFR BLD AUTO: 2.9 %
NEUTROPHILS # BLD AUTO: 3.3 10E9/L (ref 1.6–8.3)
NEUTROPHILS NFR BLD AUTO: 54.4 %
NRBC # BLD AUTO: 7.1 10*3/UL
NRBC BLD AUTO-RTO: 118 /100
PLATELET # BLD AUTO: 342 10E9/L (ref 150–450)
PLATELET # BLD EST: ABNORMAL 10*3/UL
POIKILOCYTOSIS BLD QL SMEAR: SLIGHT
POLYCHROMASIA BLD QL SMEAR: ABNORMAL
POTASSIUM SERPL-SCNC: 3.6 MMOL/L (ref 3.4–5.3)
RBC # BLD AUTO: 1.95 10E12/L (ref 3.8–5.2)
RETICS # AUTO: 569.2 10E9/L (ref 25–95)
RETICS/RBC NFR AUTO: 29.2 % (ref 0.5–2)
SICKLE CELLS BLD QL SMEAR: ABNORMAL
SODIUM SERPL-SCNC: 139 MMOL/L (ref 133–144)
TARGETS BLD QL SMEAR: SLIGHT
WBC # BLD AUTO: 6 10E9/L (ref 4–11)

## 2021-03-13 PROCEDURE — 250N000011 HC RX IP 250 OP 636: Performed by: STUDENT IN AN ORGANIZED HEALTH CARE EDUCATION/TRAINING PROGRAM

## 2021-03-13 PROCEDURE — 250N000013 HC RX MED GY IP 250 OP 250 PS 637: Performed by: NURSE PRACTITIONER

## 2021-03-13 PROCEDURE — 85045 AUTOMATED RETICULOCYTE COUNT: CPT | Performed by: PHYSICIAN ASSISTANT

## 2021-03-13 PROCEDURE — 250N000013 HC RX MED GY IP 250 OP 250 PS 637: Performed by: INTERNAL MEDICINE

## 2021-03-13 PROCEDURE — 85025 COMPLETE CBC W/AUTO DIFF WBC: CPT | Performed by: PHYSICIAN ASSISTANT

## 2021-03-13 PROCEDURE — 258N000003 HC RX IP 258 OP 636: Performed by: STUDENT IN AN ORGANIZED HEALTH CARE EDUCATION/TRAINING PROGRAM

## 2021-03-13 PROCEDURE — 36592 COLLECT BLOOD FROM PICC: CPT | Performed by: PHYSICIAN ASSISTANT

## 2021-03-13 PROCEDURE — 250N000013 HC RX MED GY IP 250 OP 250 PS 637: Performed by: STUDENT IN AN ORGANIZED HEALTH CARE EDUCATION/TRAINING PROGRAM

## 2021-03-13 PROCEDURE — 82728 ASSAY OF FERRITIN: CPT | Performed by: PHYSICIAN ASSISTANT

## 2021-03-13 PROCEDURE — 99231 SBSQ HOSP IP/OBS SF/LOW 25: CPT | Performed by: INTERNAL MEDICINE

## 2021-03-13 PROCEDURE — 99233 SBSQ HOSP IP/OBS HIGH 50: CPT | Performed by: INTERNAL MEDICINE

## 2021-03-13 PROCEDURE — 999N000127 HC STATISTIC PERIPHERAL IV START W US GUIDANCE

## 2021-03-13 PROCEDURE — 120N000002 HC R&B MED SURG/OB UMMC

## 2021-03-13 PROCEDURE — 80048 BASIC METABOLIC PNL TOTAL CA: CPT | Performed by: PHYSICIAN ASSISTANT

## 2021-03-13 RX ADMIN — CELECOXIB 100 MG: 100 CAPSULE ORAL at 12:09

## 2021-03-13 RX ADMIN — PANTOPRAZOLE SODIUM 40 MG: 40 TABLET, DELAYED RELEASE ORAL at 12:10

## 2021-03-13 RX ADMIN — HYDROXYUREA 2000 MG: 500 CAPSULE ORAL at 21:10

## 2021-03-13 RX ADMIN — RIVAROXABAN 20 MG: 20 TABLET, FILM COATED ORAL at 17:17

## 2021-03-13 RX ADMIN — FOLIC ACID 1 MG: 1 TABLET ORAL at 12:10

## 2021-03-13 RX ADMIN — SERTRALINE HYDROCHLORIDE 200 MG: 100 TABLET ORAL at 12:09

## 2021-03-13 RX ADMIN — DEFEROXAMINE MESYLATE 3000 MG: 95 INJECTION, POWDER, LYOPHILIZED, FOR SOLUTION INTRAMUSCULAR; INTRAVENOUS; SUBCUTANEOUS at 12:43

## 2021-03-13 RX ADMIN — FLUTICASONE FUROATE AND VILANTEROL TRIFENATATE 1 PUFF: 200; 25 POWDER RESPIRATORY (INHALATION) at 12:10

## 2021-03-13 RX ADMIN — OXYCODONE HYDROCHLORIDE 15 MG: 5 TABLET ORAL at 08:06

## 2021-03-13 RX ADMIN — CELECOXIB 100 MG: 100 CAPSULE ORAL at 21:10

## 2021-03-13 RX ADMIN — OXYCODONE HYDROCHLORIDE 15 MG: 5 TABLET ORAL at 01:20

## 2021-03-13 ASSESSMENT — ACTIVITIES OF DAILY LIVING (ADL)
ADLS_ACUITY_SCORE: 12

## 2021-03-13 NOTE — PROGRESS NOTES
Physician Attestation   I, Brian Sheehan MD, was present with the medical/ANDREAS student who participated in the service and in the documentation of the note.  I have verified the history and personally performed the physical exam and medical decision making.  I agree with the assessment and plan of care as documented in the note.      I personally reviewed vital signs, medications and labs.    {Key findings; - increased pain 10/10 but denies any SOB. Sats 100%.   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         Plan for Today:   - Resume her PCA basal rate and continue PTA home dose oxycodone.   - Recheck ferritin level  - Monitor closely.  Hemoglobin continues to be low and continue to hold transfusion until recommended by hematology   -Likely discharge tomorrow      Sickle cell pain crisis  Hx iron overload   Hypoxic respiratory failure  Initially presented with 1 day worsening generalized pain, unable to be managed on PO regimen at home.  Hgb 8.3, %retic 14.9, absolute retic 443.7 on admission.COVID neg. On chart review, has had two ED visits for pain in the last 1.5 weeks.  Last seen in Hematology clinic on 02/24/21 due to more frequent episodes of pain. On Oxycodone 15mg PRN, Celebrex PRN, and APAP PRN at home. Recently stopped OxyContin. Currently afebrile and stable on RA. Last episode of acute chest in 10/2019. 3/13 reticulocyte has been down trending the last couple  of days, except today  spiking from 21.3->29.2. Hgb stable between 6.3-6.5 the last 4 days.  - Hematology consulted, recs appreciated  - Will avoid Toradol for additional pain control as pt on Rivaroxaban  - Celebrex scheduled for additional pain control  - Antiemetics w/ Zofran PRN   - Benadryl PRN   - Bowel regimen w/ docusate and senna   - Continue PTA Hydroxyurea  - Lidoderm patches PRN for back pain  - Daily CBC w/ diff, retics  - Holding Jadenu for now pending ferritin recheck per above  - Low threshold for CXR, EKG, if new fever or hypoxia   - No transfusion unless discussion w/ Heme; if increased O2 needs, call heme, to consider exchange transfusion       Elevated LFTs  Iron Overload:  Chelation per heme, Desferal 3g daily for 6 days - Last ferritin 3/8 was elevated at 11,514, will recheck ferritin levels today to see if down trending.    PE - V/Q scan on 1/31 initially read as negative, re-read on 2/1 positive for acute PE.  Started on Rivaroxaban at that time.  Currently stable on room air.    - Continue Rivaroxaban 20mg every evening      Hx CVA (left MCA w/ R hemiparesis and RUE contracture) - Per chart review, had a stroke at age 1 and TIA in 2014.  Recently stopped ASA per Hematology recs.  Has RUE contracture at baseline and cognitive delay.    - Continue to hold ASA while on Rivaroxaban     Asthma - Stable.  Denies dyspnea and cough.   - Continue PTA Symbicort and PRN Albuterol       Depression, anxiety - Patient with non-depressed affect at present.  - Continue PTA Sertraline 200mg daily      Diet: Regular Diet Adult    DVT Prophylaxis: Xarelto  Lopez Catheter: not present  Code Status: Full Code         Disposition Plan   Expected discharge: Tomorrow, recommended to prior living arrangement once adequate pain management/ tolerating PO medications.  Entered: Gino Zamora 03/13/2021, 12:55 PM     The patient's care was discussed with the Bedside Nurse, Care Coordinator/, Patient and  "Hematology Consultant.    Gino Zamora  Hospitalist Service, [address]  Contact information available via [facility] Paging/Directory  Please see sign in/sign out for up to date coverage information  ______________________________________________________________________    Interval History   This morning patient complained of diffuse pain all over the body, rating it 10/10. Pain started around midnight las night and has been constant. Tolerating reg diet, notes decreased appetite. Last BM was yesterday. Denies any constipation, chest pain, SOB, palpitation, nausea or vomiting.     Data reviewed today: I reviewed all medications, new labs and imaging results over the last 24 hours. I personally reviewed no images or EKG's today.    Physical Exam   /54 (BP Location: Left arm)   Pulse 74   Temp 97.8  F (36.6  C) (Oral)   Resp 16   Ht 1.626 m (5' 4\")   Wt 75 kg (165 lb 4.8 oz)   SpO2 96%   BMI 28.37 kg/m    Gen- pleasant  lying in bed  HEENT- NAD, CONNIE  Neck- supple, no JVD elevation, no thyromegaly  CVS- I+II+ no m/r/g  RS- CTAB  Abdo- soft, no tenderness . No g/r/r   Ext- no edema   CNS- no focal signs .       Data    BMP  Recent Labs   Lab 03/13/21  0747 03/12/21  0745 03/10/21  0643 03/08/21  0832    142 143 141   POTASSIUM 3.6 3.4 3.7 4.0   CHLORIDE 108 113* 112* 112*   MICAH 8.4* 8.4* 8.5 8.5   CO2 24 24 24 26   BUN 10 7 7 8   CR 0.59 0.51* 0.56 0.61   GLC 81 83 80 95     CBC  Recent Labs   Lab 03/13/21  0747 03/12/21  0745 03/11/21  0900 03/10/21  0643   WBC 6.0 6.3 8.3 9.2   RBC 1.95* 1.97* 2.05* 2.11*   HGB 6.5* 6.3* 6.4* 6.4*   HCT 19.0* 18.5* 18.5* 18.1*   MCV 97 94 90 86   MCH 33.3* 32.0 31.2 30.3   MCHC 34.2 34.1 34.6 35.4   RDW Dimorphic population - unable to calculate 36.5* 34.2* 33.1*    375 392 435     INRNo lab results found in last 7 days.  LFTs  Recent Labs   Lab 03/10/21  0643 03/09/21  0648 03/08/21  0832 " 03/07/21  0826   ALKPHOS 71 81 75 74   * 132* 144* 136*   * 139* 152* 153*   BILITOTAL 3.0* 4.2* 3.5* 2.8*   PROTTOTAL 7.1 7.4 7.1 8.0   ALBUMIN 3.1* 3.2* 3.4 3.5      PANCNo lab results found in last 7 days.    No results found for this or any previous visit (from the past 24 hour(s)).

## 2021-03-13 NOTE — PLAN OF CARE
VSS ex hypertensive at times. Pt states pain is 10/10 but refuses other interventions offered. Gave Oxycodone q 6hrs PRN. Offered heat/cold, repositioning, aromatherapy, but pt declined. Slept majority of shift. Using commode at bedside. Had BM yesterday. Desferal infusion ended at 0400. Port infusing Morphine PCA. Continue to monitor pain control, hemoglobin.

## 2021-03-13 NOTE — PROGRESS NOTES
"  Hematology Daily Progress Note   Date of Service: 03/13/2021    Patient: Jennifer Cervantes  MRN: [id number]  Admission Date: 3/4/2021  Hospital Day # 9    Primary Hematologist: Dr. 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    #Sickle Cell pain crisis  Overall clinically improving.  No changes to PCA recommended today.  Encouraged ambulation and use of IS when in bed.     #Iron Overload  Continue desferal while inpatient     #PE (dx 2/1/21)  Continues on Xarelto with plan for 3 months of treatment.      Follow-up:   - pt currently has follow-up with primary Hematologist, Dr. Duncan, on 3/26  - Hematology team will determine if she needs to keep her currently scheduled daily infusion appointments 3/15-3/19 (for Desferal) pending hospital course.     Carlos Eduardo Villagran MD   of Medicine, Division of Hematology, Oncology and Transplantation  [employer] ___________________________________________________    Interval History:    Overall Alberta reports her pain is persistent today but the general trend is improved.  She does not have any request to change the PCA today.  She is planning to get up soon out of bed with help.  Overall she has no new concerns today.    Physical Exam:    /67 (BP Location: Left arm)   Pulse 85   Temp 99  F (37.2  C) (Oral)   Resp 16   Ht 1.626 m (5' 4\")   Wt 75 kg (165 lb 4.8 oz)   SpO2 97%   BMI 28.37 kg/m    Gen: Pleasant female seen lying in bed, NAD. Awake, alert, interactive.  HEENT: NC/AT. No supplemental O2 in place at time of assessment.   Pulm: Breathing " comfortably on room air. CTA b/l.  CV: RRR  Abd: Soft, NT/ND, no rebound/guarding.  Ext: Baseline R hand contracture. No lower extremity edema.   Skin: No lesions or rashes on exposed skin surfaces of limited exam.   Neuro: Alert and in no acute distress, with no deficits or concerning affect      Labs & Studies:   Hb stable at 6.5 from 6.4 yesterday  Creatinine is normal   recheck ferritin is 6754 (back to baseline)      Medications list for reference:  Current Facility-Administered Medications   Medication     acetaminophen (TYLENOL) tablet 650 mg     albuterol (PROAIR HFA/PROVENTIL HFA/VENTOLIN HFA) 108 (90 Base) MCG/ACT inhaler 2 puff     celecoxib (celeBREX) capsule 100 mg     deferoxamine (DESFERAL) 3,000 mg in sodium chloride 0.9 % 500 mL intermittent infusion     diphenhydrAMINE (BENADRYL) capsule 25-50 mg     docusate sodium (COLACE) capsule 100 mg     fluticasone-vilanterol (BREO ELLIPTA) 200-25 MCG/INH inhaler 1 puff     folic acid (FOLVITE) tablet 1 mg     hydroxyurea (HYDREA) capsule 2,000 mg     influenza quadrivalent (PF) vacc (FLUZONE) injection 0.5 mL     Lidocaine (LIDOCARE) 4 % Patch 1-3 patch    And     lidocaine patch in PLACE     lidocaine (LMX4) cream     lidocaine (XYLOCAINE) 2 % 15 mL, alum & mag hydroxide-simethicone (MAALOX) 15 mL GI Cocktail     lidocaine 1 % 0.1-1 mL     melatonin tablet 1 mg     morphine  PCA 1 mg/mL OPIOID TOLERANT     ondansetron (ZOFRAN-ODT) ODT tab 4 mg    Or     ondansetron (ZOFRAN) injection 4 mg     pantoprazole (PROTONIX) EC tablet 40 mg     Patient is already receiving anticoagulation with heparin, enoxaparin (LOVENOX), warfarin (COUMADIN)  or other anticoagulant medication     polyethylene glycol (MIRALAX) Packet 17 g     rivaroxaban ANTICOAGULANT (XARELTO) tablet 20 mg     senna-docusate (SENOKOT-S/PERICOLACE) 8.6-50 MG per tablet 1 tablet    Or     senna-docusate (SENOKOT-S/PERICOLACE) 8.6-50 MG per tablet 2 tablet     senna-docusate (SENOKOT-S/PERICOLACE)  8.6-50 MG per tablet 1 tablet    Or     senna-docusate (SENOKOT-S/PERICOLACE) 8.6-50 MG per tablet 2 tablet     sertraline (ZOLOFT) tablet 200 mg     sodium chloride (PF) 0.9% PF flush 3 mL     sodium chloride (PF) 0.9% PF flush 3 mL     sodium chloride 0.9% infusion

## 2021-03-13 NOTE — PLAN OF CARE
VSS on RA. Pt rating pain 10/10 this morning. Team discontinued Oxycodone and added continuous rate on PCA. Pt states pain has improved since pain medications were adjusted. Regular diet, did not want to eat anything until this evening. Voiding. No BM. Desferel infusing into PIV. Hgb 6.5 today, Dr. Sheehan notified. Pt ambulated long distance in the cardona with SBA this afternoon. Continue to monitor and with POC.

## 2021-03-14 LAB
ANION GAP SERPL CALCULATED.3IONS-SCNC: 7 MMOL/L (ref 3–14)
ANISOCYTOSIS BLD QL SMEAR: ABNORMAL
BASOPHILS # BLD AUTO: 0.2 10E9/L (ref 0–0.2)
BASOPHILS NFR BLD AUTO: 3.6 %
BUN SERPL-MCNC: 8 MG/DL (ref 7–30)
CALCIUM SERPL-MCNC: 8.3 MG/DL (ref 8.5–10.1)
CHLORIDE SERPL-SCNC: 107 MMOL/L (ref 94–109)
CO2 SERPL-SCNC: 23 MMOL/L (ref 20–32)
CREAT SERPL-MCNC: 0.55 MG/DL (ref 0.52–1.04)
DIFFERENTIAL METHOD BLD: ABNORMAL
EOSINOPHIL # BLD AUTO: 0.5 10E9/L (ref 0–0.7)
EOSINOPHIL NFR BLD AUTO: 9.1 %
ERYTHROCYTE [DISTWIDTH] IN BLOOD BY AUTOMATED COUNT: 32.7 % (ref 10–15)
GFR SERPL CREATININE-BSD FRML MDRD: >90 ML/MIN/{1.73_M2}
GLUCOSE SERPL-MCNC: 84 MG/DL (ref 70–99)
HCT VFR BLD AUTO: 20.3 % (ref 35–47)
HGB BLD-MCNC: 6.8 G/DL (ref 11.7–15.7)
LYMPHOCYTES # BLD AUTO: 1.8 10E9/L (ref 0.8–5.3)
LYMPHOCYTES NFR BLD AUTO: 31.8 %
MACROCYTES BLD QL SMEAR: PRESENT
MCH RBC QN AUTO: 32.9 PG (ref 26.5–33)
MCHC RBC AUTO-ENTMCNC: 33.5 G/DL (ref 31.5–36.5)
MCV RBC AUTO: 98 FL (ref 78–100)
MONOCYTES # BLD AUTO: 0.1 10E9/L (ref 0–1.3)
MONOCYTES NFR BLD AUTO: 1.8 %
NEUTROPHILS # BLD AUTO: 3 10E9/L (ref 1.6–8.3)
NEUTROPHILS NFR BLD AUTO: 53.7 %
NRBC # BLD AUTO: 2.1 10*3/UL
NRBC BLD AUTO-RTO: 38 /100
PLATELET # BLD AUTO: 308 10E9/L (ref 150–450)
PLATELET # BLD EST: ABNORMAL 10*3/UL
POIKILOCYTOSIS BLD QL SMEAR: SLIGHT
POLYCHROMASIA BLD QL SMEAR: ABNORMAL
POTASSIUM SERPL-SCNC: 3.4 MMOL/L (ref 3.4–5.3)
RBC # BLD AUTO: 2.07 10E12/L (ref 3.8–5.2)
RETICS # AUTO: 557.2 10E9/L (ref 25–95)
RETICS/RBC NFR AUTO: 26.9 % (ref 0.5–2)
SICKLE CELLS BLD QL SMEAR: ABNORMAL
SODIUM SERPL-SCNC: 138 MMOL/L (ref 133–144)
TARGETS BLD QL SMEAR: SLIGHT
WBC # BLD AUTO: 5.6 10E9/L (ref 4–11)

## 2021-03-14 PROCEDURE — 250N000011 HC RX IP 250 OP 636: Performed by: STUDENT IN AN ORGANIZED HEALTH CARE EDUCATION/TRAINING PROGRAM

## 2021-03-14 PROCEDURE — 250N000013 HC RX MED GY IP 250 OP 250 PS 637: Performed by: INTERNAL MEDICINE

## 2021-03-14 PROCEDURE — 80048 BASIC METABOLIC PNL TOTAL CA: CPT | Performed by: PHYSICIAN ASSISTANT

## 2021-03-14 PROCEDURE — 99233 SBSQ HOSP IP/OBS HIGH 50: CPT | Performed by: INTERNAL MEDICINE

## 2021-03-14 PROCEDURE — 85045 AUTOMATED RETICULOCYTE COUNT: CPT | Performed by: PHYSICIAN ASSISTANT

## 2021-03-14 PROCEDURE — 120N000002 HC R&B MED SURG/OB UMMC

## 2021-03-14 PROCEDURE — 36415 COLL VENOUS BLD VENIPUNCTURE: CPT | Performed by: PHYSICIAN ASSISTANT

## 2021-03-14 PROCEDURE — 258N000003 HC RX IP 258 OP 636: Performed by: STUDENT IN AN ORGANIZED HEALTH CARE EDUCATION/TRAINING PROGRAM

## 2021-03-14 PROCEDURE — 250N000013 HC RX MED GY IP 250 OP 250 PS 637: Performed by: STUDENT IN AN ORGANIZED HEALTH CARE EDUCATION/TRAINING PROGRAM

## 2021-03-14 PROCEDURE — 85025 COMPLETE CBC W/AUTO DIFF WBC: CPT | Performed by: PHYSICIAN ASSISTANT

## 2021-03-14 PROCEDURE — 250N000013 HC RX MED GY IP 250 OP 250 PS 637: Performed by: NURSE PRACTITIONER

## 2021-03-14 RX ADMIN — RIVAROXABAN 20 MG: 20 TABLET, FILM COATED ORAL at 18:24

## 2021-03-14 RX ADMIN — FLUTICASONE FUROATE AND VILANTEROL TRIFENATATE 1 PUFF: 200; 25 POWDER RESPIRATORY (INHALATION) at 12:43

## 2021-03-14 RX ADMIN — FOLIC ACID 1 MG: 1 TABLET ORAL at 12:44

## 2021-03-14 RX ADMIN — CELECOXIB 100 MG: 100 CAPSULE ORAL at 12:42

## 2021-03-14 RX ADMIN — SERTRALINE HYDROCHLORIDE 200 MG: 100 TABLET ORAL at 12:43

## 2021-03-14 RX ADMIN — PANTOPRAZOLE SODIUM 40 MG: 40 TABLET, DELAYED RELEASE ORAL at 12:42

## 2021-03-14 RX ADMIN — DEFEROXAMINE MESYLATE 3000 MG: 95 INJECTION, POWDER, LYOPHILIZED, FOR SOLUTION INTRAMUSCULAR; INTRAVENOUS; SUBCUTANEOUS at 12:46

## 2021-03-14 RX ADMIN — HYDROXYUREA 2000 MG: 500 CAPSULE ORAL at 21:17

## 2021-03-14 RX ADMIN — CELECOXIB 100 MG: 100 CAPSULE ORAL at 21:17

## 2021-03-14 ASSESSMENT — ACTIVITIES OF DAILY LIVING (ADL)
ADLS_ACUITY_SCORE: 12

## 2021-03-14 NOTE — PROGRESS NOTES
"  Hematology Daily Progress Note   Date of Service: 03/14/2021    Patient: Jennifer Cervantes  MRN: [id number]  Admission Date: 3/4/2021  Hospital Day # 10    Primary Hematologist: Dr. 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    #Sickle Cell pain crisis  Overall clinically improving, despite minor set-back in pain 3/13. Would not recommend any changes to PCA today. Would discuss with Alberta tomorrow about when she would like to consider orals again. Encouraged ambulation and use of IS when in bed.     #Iron Overload  Continue desferal while inpatient.     #PE (dx 2/1/21)  Continues on Xarelto with plan for 3 months of treatment.      Follow-up:   - pt currently has follow-up with primary Hematologist, Dr. Duncan, on 3/26  - Hematology team will determine if she needs to keep her currently scheduled daily infusion appointments 3/15-3/19 (for Desferal) pending hospital course.     Patient staffed with Dr. Villagran who agrees with the above assessment and plan.     We will continue to follow this patient. Please don't hesitate to contact the Fellow On-Call with questions.    Isaac Longoria MD PhD  Heme/Onc/Transplant Fellow  Pgr #[id number]   ___________________________________________________    Interval History:    - Added continuous rate on PCA yesterday  - Pain today is improved with PCA change  - Alberta thinks she can transition back to oral opiates tomorrow    Physical Exam:    /73 (BP Location: Left arm)   Pulse 75   Temp 97.2  F (36.2  C) (Oral)   Resp 18   Ht 1.626 m (5' 4\")   Wt 75 kg (165 lb 4.8 oz)   SpO2 100%   BMI 28.37 kg/m  "   Gen: Pleasant female seen lying in bed, NAD. Awake, alert, interactive.  HEENT: NC/AT. Nasal cannula in place  Pulm: Breathing comfortably on room air. CTA b/l.  CV: Normal rate, regular  Abd: Soft, NT/ND, no rebound/guarding.  Ext: Baseline R hand contracture. No lower extremity edema.   Skin: No lesions or rashes on exposed skin surfaces of limited exam.   Neuro: Alert and in no acute distress, with no deficits or concerning affect      Labs & Studies:   Hb stable/up-trending at 6.8 from 6.5 yesterday  Creatinine is normal    Medications list for reference:  Current Facility-Administered Medications   Medication     acetaminophen (TYLENOL) tablet 650 mg     albuterol (PROAIR HFA/PROVENTIL HFA/VENTOLIN HFA) 108 (90 Base) MCG/ACT inhaler 2 puff     celecoxib (celeBREX) capsule 100 mg     deferoxamine (DESFERAL) 3,000 mg in sodium chloride 0.9 % 500 mL intermittent infusion     diphenhydrAMINE (BENADRYL) capsule 25-50 mg     docusate sodium (COLACE) capsule 100 mg     fluticasone-vilanterol (BREO ELLIPTA) 200-25 MCG/INH inhaler 1 puff     folic acid (FOLVITE) tablet 1 mg     hydroxyurea (HYDREA) capsule 2,000 mg     influenza quadrivalent (PF) vacc (FLUZONE) injection 0.5 mL     Lidocaine (LIDOCARE) 4 % Patch 1-3 patch    And     lidocaine patch in PLACE     lidocaine (LMX4) cream     lidocaine (XYLOCAINE) 2 % 15 mL, alum & mag hydroxide-simethicone (MAALOX) 15 mL GI Cocktail     lidocaine 1 % 0.1-1 mL     melatonin tablet 1 mg     morphine  PCA 1 mg/mL OPIOID TOLERANT     ondansetron (ZOFRAN-ODT) ODT tab 4 mg    Or     ondansetron (ZOFRAN) injection 4 mg     pantoprazole (PROTONIX) EC tablet 40 mg     Patient is already receiving anticoagulation with heparin, enoxaparin (LOVENOX), warfarin (COUMADIN)  or other anticoagulant medication     polyethylene glycol (MIRALAX) Packet 17 g     rivaroxaban ANTICOAGULANT (XARELTO) tablet 20 mg     senna-docusate (SENOKOT-S/PERICOLACE) 8.6-50 MG per tablet 1 tablet    Or      senna-docusate (SENOKOT-S/PERICOLACE) 8.6-50 MG per tablet 2 tablet     senna-docusate (SENOKOT-S/PERICOLACE) 8.6-50 MG per tablet 1 tablet    Or     senna-docusate (SENOKOT-S/PERICOLACE) 8.6-50 MG per tablet 2 tablet     sertraline (ZOLOFT) tablet 200 mg     sodium chloride (PF) 0.9% PF flush 3 mL     sodium chloride (PF) 0.9% PF flush 3 mL     sodium chloride 0.9% infusion

## 2021-03-14 NOTE — PROGRESS NOTES
[facility]    Medicine Progress Note - Hospitalist Service, [place]       Date of Admission:  3/4/2021  Assessment & Plan   Jennifer Cervantes is a 21 year old female with past medical history significant for sickle cell anemia, prior CVA c/b R hemiparesis and RUE contracture, recent acute PE (2/1/21), asthma, depression, and anxiety admitted for sickle cell pain crisis.         Plan for Today:   - Ct PCA basal rate 1mg / hr and continue prn 0.6mg Q15min prn  -  Monitor closely.  Hemoglobin continues to be low and continue to hold transfusion until recommended by hematology   - no s/s of acute chest      Sickle cell pain crisis  Hx iron overload   Hypoxic respiratory failure  Initially presented with 1 day worsening generalized pain, unable to be managed on PO regimen at home.  Hgb 8.3, %retic 14.9, absolute retic 443.7 on admission.COVID neg. On chart review, has had two ED visits for pain in the last 1.5 weeks.  Last seen in Hematology clinic on 02/24/21 due to more frequent episodes of pain. On Oxycodone 15mg PRN, Celebrex PRN, and APAP PRN at home. Recently stopped OxyContin. Currently afebrile and stable on RA. Last episode of acute chest in 10/2019. 3/13 reticulocyte has been down trending the last couple of days, except today  spiking from 21.3->29.2. Hgb stable   - Hematology consulted, recs appreciated  - Will avoid Toradol for additional pain control as pt on Rivaroxaban  - Celebrex scheduled for additional pain control  - Antiemetics w/ Zofran PRN   - Benadryl PRN   - Bowel regimen w/ docusate and senna   - Continue PTA Hydroxyurea  - Lidoderm patches PRN for back pain  - Daily CBC w/ diff, retics  - Holding Jadenu for now pending ferritin recheck per above  - Low threshold for CXR, EKG, if new fever or hypoxia   - No transfusion unless discussion w/ Heme; if increased O2 needs, call heme, to consider exchange transfusion       Elevated LFTs  Iron  Overload:  Chelation per heme, Desferal 3g daily for 6 days - Last ferritin 3/8 was elevated at 11,514, will recheck ferritin levels today to see if down trending.    PE - V/Q scan on 1/31 initially read as negative, re-read on 2/1 positive for acute PE.  Started on Rivaroxaban at that time.  Currently stable on room air.    - Continue Rivaroxaban 20mg every evening      Hx CVA (left MCA w/ R hemiparesis and RUE contracture) - Per chart review, had a stroke at age 1 and TIA in 2014.  Recently stopped ASA per Hematology recs.  Has RUE contracture at baseline and cognitive delay.    - Continue to hold ASA while on Rivaroxaban     Asthma - Stable.  Denies dyspnea and cough.   - Continue PTA Symbicort and PRN Albuterol       Depression, anxiety - Patient with non-depressed affect at present.  - Continue PTA Sertraline 200mg daily      Diet: Regular Diet Adult    DVT Prophylaxis: Xarelto  Lopez Catheter: not present  Code Status: Full Code         Disposition Plan   Expected discharge: 1-2 days, recommended to prior living arrangement once adequate pain management/ tolerating PO medications.  Entered: Brian Sheehan MD, MD 03/14/2021, 1:46 PM     The patient's care was discussed with the Bedside Nurse, Care Coordinator/, Patient and Hematology Consultant.    Brian Sheehan MD, MD  Hospitalist Service, [address]  Contact information available via [facility] Paging/Directory  Please see sign in/sign out for up to date coverage information  ______________________________________________________________________    Interval History   Continues with pain, initially wanted to change to PO but later preferred to continue on PCA.   Denies SOB/ CP  Denies any new bowel/ bladder problems     Data reviewed today: I reviewed all medications, new labs and imaging results over the last 24 hours. I personally reviewed no images or EKG's today.    Physical Exam   /72  "(BP Location: Left arm)   Pulse 74   Temp 98.2  F (36.8  C) (Oral)   Resp 18   Ht 1.626 m (5' 4\")   Wt 75 kg (165 lb 4.8 oz)   SpO2 99%   BMI 28.37 kg/m    Gen- pleasant  lying in bed  HEENT- NAD, CONNIE  Neck- supple, no JVD elevation, no thyromegaly  CVS- I+II+ no m/r/g  RS- CTAB  Abdo- soft, no tenderness . No g/r/r   Ext- no edema   CNS- no focal signs .       Data    BMP  Recent Labs   Lab 03/14/21  0724 03/13/21  0747 03/12/21  0745 03/10/21  0643    139 142 143   POTASSIUM 3.4 3.6 3.4 3.7   CHLORIDE 107 108 113* 112*   MICAH 8.3* 8.4* 8.4* 8.5   CO2 23 24 24 24   BUN 8 10 7 7   CR 0.55 0.59 0.51* 0.56   GLC 84 81 83 80     CBC  Recent Labs   Lab 03/14/21  0724 03/13/21  0747 03/12/21  0745 03/11/21  0900   WBC 5.6 6.0 6.3 8.3   RBC 2.07* 1.95* 1.97* 2.05*   HGB 6.8* 6.5* 6.3* 6.4*   HCT 20.3* 19.0* 18.5* 18.5*   MCV 98 97 94 90   MCH 32.9 33.3* 32.0 31.2   MCHC 33.5 34.2 34.1 34.6   RDW 32.7* Dimorphic population - unable to calculate 36.5* 34.2*    342 375 392     INRNo lab results found in last 7 days.  LFTs  Recent Labs   Lab 03/10/21  0643 03/09/21  0648 03/08/21  0832   ALKPHOS 71 81 75   * 132* 144*   * 139* 152*   BILITOTAL 3.0* 4.2* 3.5*   PROTTOTAL 7.1 7.4 7.1   ALBUMIN 3.1* 3.2* 3.4      PANCNo lab results found in last 7 days.    No results found for this or any previous visit (from the past 24 hour(s)).  "

## 2021-03-14 NOTE — PLAN OF CARE
VSS on 2L. Pt still c/o having a lot of generalized pain today. No adjustments made to pain medications by team. Using heating pad as needed, and Morphine PCA infusing into port. Desferel infusing into PIV. Hgb 6.8 today, improved from yesterday. No c/o nausea. Voiding. No BM today. Resting between cares. Pt verbalizes wanting to go home soon. Continue to monitor and with POC.

## 2021-03-15 LAB
ANISOCYTOSIS BLD QL SMEAR: ABNORMAL
BASOPHILS # BLD AUTO: 0 10E9/L (ref 0–0.2)
BASOPHILS NFR BLD AUTO: 0 %
DIFFERENTIAL METHOD BLD: ABNORMAL
ELLIPTOCYTES BLD QL SMEAR: SLIGHT
EOSINOPHIL # BLD AUTO: 0.5 10E9/L (ref 0–0.7)
EOSINOPHIL NFR BLD AUTO: 8 %
ERYTHROCYTE [DISTWIDTH] IN BLOOD BY AUTOMATED COUNT: 31.4 % (ref 10–15)
HCT VFR BLD AUTO: 19.5 % (ref 35–47)
HGB BLD-MCNC: 6.6 G/DL (ref 11.7–15.7)
LYMPHOCYTES # BLD AUTO: 1.9 10E9/L (ref 0.8–5.3)
LYMPHOCYTES NFR BLD AUTO: 29.5 %
MCH RBC QN AUTO: 34 PG (ref 26.5–33)
MCHC RBC AUTO-ENTMCNC: 33.8 G/DL (ref 31.5–36.5)
MCV RBC AUTO: 101 FL (ref 78–100)
MONOCYTES # BLD AUTO: 0.1 10E9/L (ref 0–1.3)
MONOCYTES NFR BLD AUTO: 0.9 %
NEUTROPHILS # BLD AUTO: 3.9 10E9/L (ref 1.6–8.3)
NEUTROPHILS NFR BLD AUTO: 61.6 %
NRBC # BLD AUTO: 0.4 10*3/UL
NRBC BLD AUTO-RTO: 6 /100
PLATELET # BLD AUTO: 273 10E9/L (ref 150–450)
PLATELET # BLD EST: ABNORMAL 10*3/UL
POIKILOCYTOSIS BLD QL SMEAR: ABNORMAL
POLYCHROMASIA BLD QL SMEAR: ABNORMAL
RBC # BLD AUTO: 1.94 10E12/L (ref 3.8–5.2)
RETICS # AUTO: 408.4 10E9/L (ref 25–95)
RETICS/RBC NFR AUTO: 21.1 % (ref 0.5–2)
SICKLE CELLS BLD QL SMEAR: ABNORMAL
TARGETS BLD QL SMEAR: SLIGHT
WBC # BLD AUTO: 6.4 10E9/L (ref 4–11)

## 2021-03-15 PROCEDURE — 250N000013 HC RX MED GY IP 250 OP 250 PS 637: Performed by: NURSE PRACTITIONER

## 2021-03-15 PROCEDURE — 85025 COMPLETE CBC W/AUTO DIFF WBC: CPT | Performed by: PHYSICIAN ASSISTANT

## 2021-03-15 PROCEDURE — 250N000011 HC RX IP 250 OP 636: Performed by: STUDENT IN AN ORGANIZED HEALTH CARE EDUCATION/TRAINING PROGRAM

## 2021-03-15 PROCEDURE — 99231 SBSQ HOSP IP/OBS SF/LOW 25: CPT | Performed by: INTERNAL MEDICINE

## 2021-03-15 PROCEDURE — 250N000011 HC RX IP 250 OP 636: Performed by: INTERNAL MEDICINE

## 2021-03-15 PROCEDURE — 99233 SBSQ HOSP IP/OBS HIGH 50: CPT | Performed by: INTERNAL MEDICINE

## 2021-03-15 PROCEDURE — 85045 AUTOMATED RETICULOCYTE COUNT: CPT | Performed by: PHYSICIAN ASSISTANT

## 2021-03-15 PROCEDURE — 258N000003 HC RX IP 258 OP 636: Performed by: STUDENT IN AN ORGANIZED HEALTH CARE EDUCATION/TRAINING PROGRAM

## 2021-03-15 PROCEDURE — 36415 COLL VENOUS BLD VENIPUNCTURE: CPT | Performed by: PHYSICIAN ASSISTANT

## 2021-03-15 PROCEDURE — 120N000002 HC R&B MED SURG/OB UMMC

## 2021-03-15 PROCEDURE — 250N000013 HC RX MED GY IP 250 OP 250 PS 637: Performed by: INTERNAL MEDICINE

## 2021-03-15 PROCEDURE — 250N000013 HC RX MED GY IP 250 OP 250 PS 637: Performed by: STUDENT IN AN ORGANIZED HEALTH CARE EDUCATION/TRAINING PROGRAM

## 2021-03-15 RX ORDER — ACETAMINOPHEN 325 MG/1
650 TABLET ORAL EVERY 6 HOURS PRN
Status: DISCONTINUED | OUTPATIENT
Start: 2021-03-15 | End: 2021-03-16 | Stop reason: HOSPADM

## 2021-03-15 RX ADMIN — Medication: at 11:35

## 2021-03-15 RX ADMIN — OXYCODONE HYDROCHLORIDE 15 MG: 5 TABLET ORAL at 18:14

## 2021-03-15 RX ADMIN — HYDROXYUREA 2000 MG: 500 CAPSULE ORAL at 19:14

## 2021-03-15 RX ADMIN — FLUTICASONE FUROATE AND VILANTEROL TRIFENATATE 1 PUFF: 200; 25 POWDER RESPIRATORY (INHALATION) at 11:24

## 2021-03-15 RX ADMIN — CELECOXIB 100 MG: 100 CAPSULE ORAL at 21:37

## 2021-03-15 RX ADMIN — ACETAMINOPHEN 650 MG: 325 TABLET, FILM COATED ORAL at 11:26

## 2021-03-15 RX ADMIN — FOLIC ACID 1 MG: 1 TABLET ORAL at 11:24

## 2021-03-15 RX ADMIN — PANTOPRAZOLE SODIUM 40 MG: 40 TABLET, DELAYED RELEASE ORAL at 11:24

## 2021-03-15 RX ADMIN — SERTRALINE HYDROCHLORIDE 200 MG: 100 TABLET ORAL at 11:25

## 2021-03-15 RX ADMIN — DEFEROXAMINE MESYLATE 3000 MG: 95 INJECTION, POWDER, LYOPHILIZED, FOR SOLUTION INTRAMUSCULAR; INTRAVENOUS; SUBCUTANEOUS at 11:48

## 2021-03-15 RX ADMIN — RIVAROXABAN 20 MG: 20 TABLET, FILM COATED ORAL at 16:33

## 2021-03-15 RX ADMIN — OXYCODONE HYDROCHLORIDE 15 MG: 5 TABLET ORAL at 11:26

## 2021-03-15 RX ADMIN — CELECOXIB 100 MG: 100 CAPSULE ORAL at 11:25

## 2021-03-15 ASSESSMENT — ACTIVITIES OF DAILY LIVING (ADL)
ADLS_ACUITY_SCORE: 12

## 2021-03-15 NOTE — PLAN OF CARE
VSS on RA. A&Ox4. Denies nausea. Generalized pain, most notably in back adequately controlled with Morphine PCA and PRN Oxycodone x1. Up ad janett in room, voiding adequately in commode. Passing gas, had soft BM this shift. Walked in halls. Port with Morphine PCA infusing, PIV infusding 12 hour Desferal infusion. Tolerating regular diet. Continue with plan of care.

## 2021-03-15 NOTE — PLAN OF CARE
VSS on RA. Denies SOB, chest pain. Per orders, stopped PCA basal rate and transitioned to PO oxycodone 15mg Q6, with PCA bumps Q15 min; pain managed well so far. Tolerating regular diet. Reports passing stool, declined bowel meds. Voiding with good output. Up ad janett in room. Continue monitoring.

## 2021-03-15 NOTE — PROGRESS NOTES
Hematology  Daily Progress Note   Date of Service: 03/15/2021  Patient: Jennifer Cervantes  MRN: [id number]  Admission Date: 3/4/2021  Hospital Day # 11  Hematology Diagnosis: Sickle Cell Disease (HgbSS)   Primary Outpatient Hematologist: Dr. Duncan      Recommendations:   - Agree with weaning PCA per primary team with transition to oral pain medication.   - Continue desferal while inpatient.   - Continue to encourage ambulation and use of IS when in bed.  - Follow-up:               - Appt. With Dr. Duncan is scheduled on 3/26              - We will keep her scheduled daily Desferal infusion appts through 3/19 upon discharge.       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    #Sickle Cell pain crisis  Overall clinically improving, despite minor set-back in pain 3/13. Patient open to beginning transition to oral pain medications on 3/15. PCA basal rate discontinued, bolus dosing 0.6mg Q15 min prn and Oxy 15mg Q6H prn available per primary team.   - Agree with weaning PCA per primary team with transition to oral pain medication.   - Encourage ambulation and use of IS when in bed.    #Iron Overload  Ferritin was elevated at 11,514 on 3/8. Recheck on 3/13 down to 6,754. Jadenu on hold for now in setting of Desreral below.   - Continue desferal while inpatient.  - We will keep her scheduled daily Desferal infusion appts through 3/19 upon discharge.     #PE (dx 2/1/21)  Continues on Xarelto with plan for 3 months of treatment.      Follow-up:   - pt currently has follow-up with primary Hematologist, Dr. Duncan, on 3/26  - keep her currently scheduled daily infusion appointments 3/15-3/19 (for Desferal) on discharge.  "      Patient and plan of care was discussed with attending physician Dr. Villagran.    Thank you for the opportunity to partake in this patients plan of care. Please do not hesitate to page with questions. We will continue to follow.     Eulalia Mosley PA-C   Hematology/Oncology   Pager: [phone]   ___________________________________________________________________    Subjective & Interval History:    - Afebrile and VSS on RA.   - Pain under fair control overnight, patient expressing desire to transition to oral pain medications today so that she can potentially go home in the next day or so.   - She has been up and out of bed.     Physical Exam:    Blood pressure 125/71, pulse 81, temperature 97.6  F (36.4  C), temperature source Oral, resp. rate 16, height 1.626 m (5' 4\"), weight 75 kg (165 lb 4.8 oz), SpO2 95 %, not currently breastfeeding.    Gen: Pleasant female seen lying in bed, NAD. Awake, alert, interactive.  HEENT: NC/AT. Nasal cannula in place  Pulm: Breathing comfortably on room air. CTA b/l.  CV: Normal rate, regular  Abd: Soft, NT/ND, no rebound/guarding.  Ext: Baseline R hand contracture. No lower extremity edema.   Skin: No lesions or rashes on exposed skin surfaces of limited exam.   Neuro: Alert and in no acute distress, with no deficits or concerning affect    Labs & Studies: I personally reviewed the following studies:  Hb stable, 6.6 from 6.8 yesterday  Creatinine is normal    ROUTINE LABS (Last four results):  CMP  Recent Labs   Lab 03/14/21  0724 03/13/21  0747 03/12/21  0745 03/10/21  0643 03/09/21  0648    139 142 143  --    POTASSIUM 3.4 3.6 3.4 3.7  --    CHLORIDE 107 108 113* 112*  --    CO2 23 24 24 24  --    ANIONGAP 7 7 5 7  --    GLC 84 81 83 80  --    BUN 8 10 7 7  --    CR 0.55 0.59 0.51* 0.56  --    GFRESTIMATED >90 >90 >90 >90  --    GFRESTBLACK >90 >90 >90 >90  --    MICAH 8.3* 8.4* 8.4* 8.5  --    PROTTOTAL  --   --   --  7.1 7.4   ALBUMIN  --   --   --  3.1* 3.2*   BILITOTAL  --   " --   --  3.0* 4.2*   ALKPHOS  --   --   --  71 81   AST  --   --   --  104* 132*   ALT  --   --   --  108* 139*     CBC  Recent Labs   Lab 03/15/21  0749 03/14/21  0724 03/13/21  0747 03/12/21  0745   WBC 6.4 5.6 6.0 6.3   RBC 1.94* 2.07* 1.95* 1.97*   HGB 6.6* 6.8* 6.5* 6.3*   HCT 19.5* 20.3* 19.0* 18.5*   * 98 97 94   MCH 34.0* 32.9 33.3* 32.0   MCHC 33.8 33.5 34.2 34.1   RDW 31.4* 32.7* Dimorphic population - unable to calculate 36.5*    308 342 375     INRNo lab results found in last 7 days.    Medications list for reference:  Current Facility-Administered Medications   Medication     acetaminophen (TYLENOL) tablet 650 mg     albuterol (PROAIR HFA/PROVENTIL HFA/VENTOLIN HFA) 108 (90 Base) MCG/ACT inhaler 2 puff     celecoxib (celeBREX) capsule 100 mg     deferoxamine (DESFERAL) 3,000 mg in sodium chloride 0.9 % 500 mL intermittent infusion     diphenhydrAMINE (BENADRYL) capsule 25-50 mg     docusate sodium (COLACE) capsule 100 mg     fluticasone-vilanterol (BREO ELLIPTA) 200-25 MCG/INH inhaler 1 puff     folic acid (FOLVITE) tablet 1 mg     hydroxyurea (HYDREA) capsule 2,000 mg     influenza quadrivalent (PF) vacc (FLUZONE) injection 0.5 mL     Lidocaine (LIDOCARE) 4 % Patch 1-3 patch    And     lidocaine patch in PLACE     lidocaine (LMX4) cream     lidocaine (XYLOCAINE) 2 % 15 mL, alum & mag hydroxide-simethicone (MAALOX) 15 mL GI Cocktail     lidocaine 1 % 0.1-1 mL     melatonin tablet 1 mg     morphine  PCA 1 mg/mL OPIOID TOLERANT     ondansetron (ZOFRAN-ODT) ODT tab 4 mg    Or     ondansetron (ZOFRAN) injection 4 mg     pantoprazole (PROTONIX) EC tablet 40 mg     Patient is already receiving anticoagulation with heparin, enoxaparin (LOVENOX), warfarin (COUMADIN)  or other anticoagulant medication     polyethylene glycol (MIRALAX) Packet 17 g     rivaroxaban ANTICOAGULANT (XARELTO) tablet 20 mg     senna-docusate (SENOKOT-S/PERICOLACE) 8.6-50 MG per tablet 1 tablet    Or     senna-docusate  (SENOKOT-S/PERICOLACE) 8.6-50 MG per tablet 2 tablet     senna-docusate (SENOKOT-S/PERICOLACE) 8.6-50 MG per tablet 1 tablet    Or     senna-docusate (SENOKOT-S/PERICOLACE) 8.6-50 MG per tablet 2 tablet     sertraline (ZOLOFT) tablet 200 mg     sodium chloride (PF) 0.9% PF flush 3 mL     sodium chloride (PF) 0.9% PF flush 3 mL     sodium chloride 0.9% infusion

## 2021-03-15 NOTE — PROGRESS NOTES
Physician Attestation   I, Brian Sheehan MD, was present with the medical/ANDREAS student who participated in the service and in the documentation of the note.  I have verified the history and personally performed the physical exam and medical decision making.  I agree with the assessment and plan of care as documented in the note.      I personally reviewed vital signs, medications and labs.    {Key findings; feels pain is better controlled. Denies any CP/SOB/ hypoxemia  Wants to transition to PO pain meds     P: as below. Discontinue basal rate and start PO Oxycodone PTA dose    Brian Sheehan MD, MD  Date of Service (when I saw the patient): 03/15/21        [facility]    Medicine Progress Note - Hospitalist Service, Gold 8       Date of Admission:  3/4/2021  Assessment & Plan   Jennifer Cervantes is a 21 year old female with past medical history significant for sickle cell anemia, prior CVA c/b R hemiparesis and RUE contracture, recent acute PE (2/1/21), asthma, depression, and anxiety admitted for sickle cell pain crisis. Clinically improving overall, will transition to oral PTA pain regimen today.     Plan for Today:   - Wean PCA basal rate and transition to her oral PTA oxycodone 15mg q6  - Monitor closely.  Hemoglobin continues to be low and continue to hold transfusion until recommended by hematology   - Encourage ambulation, likely discharge tomorrow if tolerating changes in medication and has no s/s of acute chest      Sickle cell pain crisis  Hx iron overload   Hypoxic respiratory failure  Initially presented with 1 day worsening generalized pain, unable to be managed on PO regimen at home.  Hgb 8.3, %retic 14.9, absolute retic 443.7 on admission.COVID neg. On chart review, has had two ED visits for pain in the last 1.5 weeks.  Last seen in Hematology clinic on 02/24/21 due to more frequent episodes of pain. On Oxycodone 15mg PRN, Celebrex PRN, and APAP PRN at home.  Recently stopped OxyContin. Currently afebrile and stable on RA. Last episode of acute chest in 10/2019. 3/14 reticulocyte down trending, from 26.9->21.1. Hgb stable at 6. Patient is  - Hematology consulted, recs appreciated  - Will avoid Toradol for additional pain control as pt on Rivaroxaban  - Celebrex scheduled for additional pain control  - Antiemetics w/ Zofran PRN   - Benadryl PRN   - Bowel regimen w/ docusate and senna   - Continue PTA Hydroxyurea  - Lidoderm patches PRN for back pain  - Daily CBC w/ diff, retics  - Holding Jadenu for now pending ferritin recheck per above  - Low threshold for CXR, EKG, if new fever or hypoxia   - No transfusion unless discussion w/ Heme; if increased O2 needs, call heme, to consider exchange transfusion       Elevated LFTs  Iron Overload:  Chelation per heme, Desferal 3g daily for 6 days - Repeat ferritin on 3/13 down trending from 11,514 -> 6,754.  - Continue desferal while inpatient    PE - V/Q scan on 1/31 initially read as negative, re-read on 2/1 positive for acute PE.  Started on Rivaroxaban at that time.  Currently stable on room air.    - Continue Rivaroxaban 20mg every evening      Hx CVA (left MCA w/ R hemiparesis and RUE contracture) - Per chart review, had a stroke at age 1 and TIA in 2014.  Recently stopped ASA per Hematology recs.  Has RUE contracture at baseline and cognitive delay.    - Continue to hold ASA while on Rivaroxaban     Asthma - Stable.  Denies dyspnea and cough.   - Continue PTA Symbicort and PRN Albuterol       Depression, anxiety - Patient with non-depressed affect at present.  - Continue PTA Sertraline 200mg daily      Diet: Regular Diet Adult    DVT Prophylaxis: Xarelto  Lopez Catheter: not present  Code Status: Full Code         Disposition Plan   Expected discharge: [place], recommended to prior living arrangement once adequate pain management/ tolerating PO medications.  Entered: Gino Zamora 03/15/2021, 11:59 AM     The patient's  "care was discussed with the Bedside Nurse, Care Coordinator/, Patient and Hematology Consultant.    Gino Zamora, MS4  Hospitalist Service, [address]  Contact information available via [facility] Paging/Directory  Please see sign in/sign out for up to date coverage information  ______________________________________________________________________    Interval History    Patient feeling better this morning, she notes lower back pain unrelated to her menstrual period. Pain comes and goes and rated 7/10 at its peak. She believes that her current pain medication are adequate. Denies any tingling, numbness, pain radiating down her legs or loss of urine or bowel movement.     ROS: Denies any fever, chest pain, shortness of breath and has not required any oxygen in the past 24 hours.     Data reviewed today: I reviewed all medications, new labs and imaging results over the last 24 hours. I personally reviewed no images or EKG's today.    Physical Exam   /71 (BP Location: Right arm)   Pulse 81   Temp 97.6  F (36.4  C) (Oral)   Resp 16   Ht 1.626 m (5' 4\")   Wt 75 kg (165 lb 4.8 oz)   SpO2 95%   BMI 28.37 kg/m       GENERAL: laying in bed comfortably in no acute distress  Neck: supple, no JVD, or thyromegaly  Cardiac: regular rate, rhythm, no murmurs, rubs or gallops  Lungs: clear to ascultation bilaterally, no wheezing or crackles  Abdomen: Soft, non tender, non distended  Extremities: No rash or edema  CNS: No focal neurological deficit      Data    BMP  Recent Labs   Lab 03/14/21  0724 03/13/21  0747 03/12/21  0745 03/10/21  0643    139 142 143   POTASSIUM 3.4 3.6 3.4 3.7   CHLORIDE 107 108 113* 112*   MICAH 8.3* 8.4* 8.4* 8.5   CO2 23 24 24 24   BUN 8 10 7 7   CR 0.55 0.59 0.51* 0.56   GLC 84 81 83 80     CBC  Recent Labs   Lab 03/15/21  0749 03/14/21  0724 03/13/21  0747 03/12/21  0745   WBC 6.4 5.6 6.0 6.3   RBC 1.94* 2.07* 1.95* 1.97* "   HGB 6.6* 6.8* 6.5* 6.3*   HCT 19.5* 20.3* 19.0* 18.5*   * 98 97 94   MCH 34.0* 32.9 33.3* 32.0   MCHC 33.8 33.5 34.2 34.1   RDW 31.4* 32.7* Dimorphic population - unable to calculate 36.5*    308 342 375     INRNo lab results found in last 7 days.  LFTs  Recent Labs   Lab 03/10/21  0643 03/09/21  0648   ALKPHOS 71 81   * 132*   * 139*   BILITOTAL 3.0* 4.2*   PROTTOTAL 7.1 7.4   ALBUMIN 3.1* 3.2*      PANCNo lab results found in last 7 days.    No results found for this or any previous visit (from the past 24 hour(s)).

## 2021-03-15 NOTE — PLAN OF CARE
VSS on room air. Pt remains on PCA Morphine 1mg cont w/ 0. 6mg q 15min. Expresses wanting to switch to oral opioids today. Pt tolerating reg diet, no reports of nausea. Voiding at bedside commode, on menses. Had BM yesterday. R arm contracture at baseline. Last Hgb 6.8. Calls appropriately and able to make needs known.

## 2021-03-16 ENCOUNTER — PATIENT OUTREACH (OUTPATIENT)
Dept: CARE COORDINATION | Facility: CLINIC | Age: 21
End: 2021-03-16

## 2021-03-16 VITALS
WEIGHT: 165.3 LBS | TEMPERATURE: 98.7 F | SYSTOLIC BLOOD PRESSURE: 127 MMHG | OXYGEN SATURATION: 95 % | BODY MASS INDEX: 28.22 KG/M2 | HEART RATE: 115 BPM | DIASTOLIC BLOOD PRESSURE: 78 MMHG | HEIGHT: 64 IN | RESPIRATION RATE: 18 BRPM

## 2021-03-16 DIAGNOSIS — D57.00 SICKLE CELL PAIN CRISIS (H): Primary | ICD-10-CM

## 2021-03-16 LAB
ANISOCYTOSIS BLD QL SMEAR: ABNORMAL
BASOPHILS # BLD AUTO: 0.1 10E9/L (ref 0–0.2)
BASOPHILS NFR BLD AUTO: 0.9 %
DIFFERENTIAL METHOD BLD: ABNORMAL
EOSINOPHIL # BLD AUTO: 0.5 10E9/L (ref 0–0.7)
EOSINOPHIL NFR BLD AUTO: 9.1 %
ERYTHROCYTE [DISTWIDTH] IN BLOOD BY AUTOMATED COUNT: 30.4 % (ref 10–15)
HCT VFR BLD AUTO: 19.5 % (ref 35–47)
HGB BLD-MCNC: 6.7 G/DL (ref 11.7–15.7)
LYMPHOCYTES # BLD AUTO: 1.7 10E9/L (ref 0.8–5.3)
LYMPHOCYTES NFR BLD AUTO: 29.1 %
MACROCYTES BLD QL SMEAR: PRESENT
MCH RBC QN AUTO: 34.2 PG (ref 26.5–33)
MCHC RBC AUTO-ENTMCNC: 34.4 G/DL (ref 31.5–36.5)
MCV RBC AUTO: 100 FL (ref 78–100)
MONOCYTES # BLD AUTO: 0 10E9/L (ref 0–1.3)
MONOCYTES NFR BLD AUTO: 0 %
MYELOCYTES # BLD: 0.1 10E9/L
MYELOCYTES NFR BLD MANUAL: 0.9 %
NEUTROPHILS # BLD AUTO: 3.4 10E9/L (ref 1.6–8.3)
NEUTROPHILS NFR BLD AUTO: 60 %
NRBC # BLD AUTO: 0.8 10*3/UL
NRBC BLD AUTO-RTO: 14 /100
PLATELET # BLD AUTO: 311 10E9/L (ref 150–450)
PLATELET # BLD EST: ABNORMAL 10*3/UL
POIKILOCYTOSIS BLD QL SMEAR: ABNORMAL
POLYCHROMASIA BLD QL SMEAR: ABNORMAL
RBC # BLD AUTO: 1.96 10E12/L (ref 3.8–5.2)
RETICS # AUTO: 384.6 10E9/L (ref 25–95)
RETICS/RBC NFR AUTO: 19.6 % (ref 0.5–2)
SICKLE CELLS BLD QL SMEAR: ABNORMAL
TARGETS BLD QL SMEAR: ABNORMAL
WBC # BLD AUTO: 5.7 10E9/L (ref 4–11)

## 2021-03-16 PROCEDURE — 250N000011 HC RX IP 250 OP 636: Performed by: INTERNAL MEDICINE

## 2021-03-16 PROCEDURE — 250N000011 HC RX IP 250 OP 636: Performed by: STUDENT IN AN ORGANIZED HEALTH CARE EDUCATION/TRAINING PROGRAM

## 2021-03-16 PROCEDURE — 250N000013 HC RX MED GY IP 250 OP 250 PS 637: Performed by: STUDENT IN AN ORGANIZED HEALTH CARE EDUCATION/TRAINING PROGRAM

## 2021-03-16 PROCEDURE — 258N000003 HC RX IP 258 OP 636: Performed by: STUDENT IN AN ORGANIZED HEALTH CARE EDUCATION/TRAINING PROGRAM

## 2021-03-16 PROCEDURE — 250N000013 HC RX MED GY IP 250 OP 250 PS 637: Performed by: INTERNAL MEDICINE

## 2021-03-16 PROCEDURE — 250N000013 HC RX MED GY IP 250 OP 250 PS 637: Performed by: NURSE PRACTITIONER

## 2021-03-16 PROCEDURE — 85045 AUTOMATED RETICULOCYTE COUNT: CPT | Performed by: PHYSICIAN ASSISTANT

## 2021-03-16 PROCEDURE — 85025 COMPLETE CBC W/AUTO DIFF WBC: CPT | Performed by: PHYSICIAN ASSISTANT

## 2021-03-16 PROCEDURE — 99239 HOSP IP/OBS DSCHRG MGMT >30: CPT | Performed by: INTERNAL MEDICINE

## 2021-03-16 PROCEDURE — 36592 COLLECT BLOOD FROM PICC: CPT | Performed by: PHYSICIAN ASSISTANT

## 2021-03-16 RX ORDER — HEPARIN SODIUM (PORCINE) LOCK FLUSH IV SOLN 100 UNIT/ML 100 UNIT/ML
5 SOLUTION INTRAVENOUS
Status: DISCONTINUED | OUTPATIENT
Start: 2021-03-16 | End: 2021-03-16 | Stop reason: HOSPADM

## 2021-03-16 RX ORDER — DEFERASIROX 360 MG/1
1440 TABLET, FILM COATED ORAL EVERY EVENING
Qty: 30 TABLET | Refills: 0 | Status: SHIPPED | OUTPATIENT
Start: 2021-03-16 | End: 2021-06-04

## 2021-03-16 RX ORDER — HEPARIN SODIUM,PORCINE 10 UNIT/ML
5-10 VIAL (ML) INTRAVENOUS
Status: DISCONTINUED | OUTPATIENT
Start: 2021-03-16 | End: 2021-03-16 | Stop reason: HOSPADM

## 2021-03-16 RX ORDER — LIDOCAINE 40 MG/G
CREAM TOPICAL
Status: DISCONTINUED | OUTPATIENT
Start: 2021-03-16 | End: 2021-03-16 | Stop reason: HOSPADM

## 2021-03-16 RX ORDER — OXYCODONE HCL 10 MG/1
10 TABLET, FILM COATED, EXTENDED RELEASE ORAL EVERY MORNING
Qty: 30 TABLET | Refills: 0 | Status: SHIPPED | OUTPATIENT
Start: 2021-03-16 | End: 2021-03-18

## 2021-03-16 RX ORDER — HEPARIN SODIUM,PORCINE 10 UNIT/ML
5-10 VIAL (ML) INTRAVENOUS EVERY 24 HOURS
Status: DISCONTINUED | OUTPATIENT
Start: 2021-03-16 | End: 2021-03-16 | Stop reason: HOSPADM

## 2021-03-16 RX ADMIN — FLUTICASONE FUROATE AND VILANTEROL TRIFENATATE 1 PUFF: 200; 25 POWDER RESPIRATORY (INHALATION) at 07:27

## 2021-03-16 RX ADMIN — PANTOPRAZOLE SODIUM 40 MG: 40 TABLET, DELAYED RELEASE ORAL at 07:27

## 2021-03-16 RX ADMIN — Medication 5 ML: at 17:00

## 2021-03-16 RX ADMIN — CELECOXIB 100 MG: 100 CAPSULE ORAL at 09:51

## 2021-03-16 RX ADMIN — SERTRALINE HYDROCHLORIDE 200 MG: 100 TABLET ORAL at 07:27

## 2021-03-16 RX ADMIN — OXYCODONE HYDROCHLORIDE 15 MG: 5 TABLET ORAL at 09:57

## 2021-03-16 RX ADMIN — RIVAROXABAN 20 MG: 20 TABLET, FILM COATED ORAL at 16:19

## 2021-03-16 RX ADMIN — FOLIC ACID 1 MG: 1 TABLET ORAL at 07:27

## 2021-03-16 RX ADMIN — OXYCODONE HYDROCHLORIDE 15 MG: 5 TABLET ORAL at 14:10

## 2021-03-16 RX ADMIN — DEFEROXAMINE MESYLATE 3000 MG: 95 INJECTION, POWDER, LYOPHILIZED, FOR SOLUTION INTRAMUSCULAR; INTRAVENOUS; SUBCUTANEOUS at 09:50

## 2021-03-16 ASSESSMENT — ACTIVITIES OF DAILY LIVING (ADL)
ADLS_ACUITY_SCORE: 12

## 2021-03-16 NOTE — PLAN OF CARE
[id number]  Alert and oriented x4. VSS on RA. Up ad janett to BSC, SBA in halls. Voiding spontaneously, adequate UOP, jesica urine. Passing gas.  Port with morphine PCA infusing. R ENRIQUE FRANCOIS. States her pain is mostly in back.  Regular diet, denies nausea.

## 2021-03-16 NOTE — DISCHARGE SUMMARY
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        Follow up with Hematologist , at (location with clinic name or city) [facility]   , as planned  .     Appointments on [place] and/or [facility] (with [facility] or [facility]   provider or service). Call [phone] if you haven't heard regarding   these appointments within 7 days of discharge.         {    Unresulted Labs Ordered in the Past 30 Days of this Admission     No orders found from 2/2/2021 to 3/5/2021.        Discharge Disposition   Discharged to home  Condition at discharge: Stable      Hospital Course   Jennifer Cervantes is a 21 year old female with past medical history significant for sickle cell anemia, prior CVA c/b R hemiparesis and RUE contracture, recent acute PE (2/1/21), asthma, depression, and anxiety admitted for sickle cell pain crisis.     #Lower Back pain, improving and well controlled with current PCA regimen.  - Continue Lidocaine patch      Sickle cell pain crisis  Hx iron overload   Hypoxic respiratory failure  Initially presented with 1 day worsening generalized pain, unable to be managed on PO regimen at home.  Hgb 8.3, %retic 14.9, absolute retic 443.7 on admission.COVID neg. On chart review, has had two ED visits for pain in the last 1.5 weeks.  Last seen in Hematology  clinic on 02/24/21 due to more frequent episodes of pain. On Oxycodone 15mg PRN, Celebrex PRN, and APAP PRN at home. Recently stopped OxyContin. Currently afebrile and stable on RA. Last episode of acute chest in 10/2019. 3/15 reticulocyte down trending, from 21.1->19.1. Hgb stable at 6.7, patient is clinically improving overall.  - Hematology consulted, recs appreciated. She was managed conservqatively with IVF, pain management with morphine PCA and O2. She clinically improved and medicines were changed to PO on discharge .  - Holding Jadenu in patient and was resumed on discharge     Elevated LFTs  Iron Overload:  Chelation per heme, Desferal 3g daily for 6 days - Repeat ferritin on 3/13 down trending from 11,514 -> 6,754.  - Continued desferal while inpatient    PE - V/Q scan on 1/31 initially read as negative, re-read on 2/1 positive for acute PE.  Started on Rivaroxaban at that time.  Currently stable on room air.    - Continued Rivaroxaban 20mg every evening      Hx CVA (left MCA w/ R hemiparesis and RUE contracture) - Per chart review, had a stroke at age 1 and TIA in 2014.  Recently stopped ASA per Hematology recs.  Has RUE contracture at baseline and cognitive delay.    - Continued to hold ASA while on Rivaroxaban     Asthma - Stable.  Denies dyspnea and cough.   - Continued PTA Symbicort and PRN Albuterol       Depression, anxiety - Patient with non-depressed affect at present.  - Continued PTA Sertraline 200mg daily     Consultations This Hospital Stay   HEMATOLOGY ADULT IP CONSULT  VASCULAR ACCESS CARE ADULT IP CONSULT  CARE MANAGEMENT / SOCIAL WORK IP CONSULT  VASCULAR ACCESS CARE ADULT IP CONSULT  VASCULAR ACCESS CARE ADULT IP CONSULT  VASCULAR ACCESS CARE ADULT IP CONSULT    Code Status   Full Code    Time Spent on this Encounter   I, Brian Sheehan MD, MD, personally saw the patient today and spent greater than 30 minutes discharging this patient.       Brian Sheehan MD, MD  Formerly [facility]  [address]  [address] [place] [address]  Phone: [phone]  ______________________________________________________________________    Physical Exam   Vital Signs: Temp: 98.7  F (37.1  C) Temp src: Oral BP: 127/78 Pulse: 115   Resp: 18 SpO2: 95 % O2 Device: None (Room air)    Weight: 165 lbs 4.8 oz  GENERAL: laying in bed comfortably in no acute distress  Neck: supple, no JVD, or thyromegaly  Cardiac: regular rate, rhythm, no murmurs, rubs or gallops  Lungs: clear to ascultation bilaterally, no wheezing or crackles  Abdomen: Soft, non tender, non distended  Extremities: No rash or edema  CNS: No focal neurological deficit       Primary Care Physician   Suraj Case    Discharge Orders      Reason for your hospital stay    Jennifer Cervantes is a 21 year old woman with a history of Sickle Cell Disease (HgbSS) and stroke (age 1) with residual RUE weakness, TIA 2014, acute chest (2019), iron overload due to transfusions as secondary prevention for stroke, and PE (dx 2/1/21) on Xarelto, who is admitted with uncomplicated sickle cell pain crisis.     Activity    Your activity upon discharge: activity as tolerated     When to contact your care team    Call your primary doctor if you have any of the following: temperature greater than 100.4 or less than 96,  increased shortness of breath or increased pain.     Adult [facility] Follow-up and recommended labs and tests    Follow up with primary care provider, Suraj Case, within 7 days for hospital follow- up.        Follow up with Hematologist , at (location with clinic name or city) [facility] , as planned  .     Appointments on [place] and/or [facility] (with [facility] or [facility] provider or service). Call [phone] if you haven't heard regarding these appointments within 7 days of discharge.     Diet    Follow this diet upon discharge: Orders Placed This Encounter      Regular Diet Adult       Significant Results and Procedures   Results for orders  placed or performed during the hospital encounter of 03/04/21   XR Chest Port 1 View    Narrative    Exam: XR CHEST PORT 1 VW, 3/6/2021 9:19 AM    Indication: sickle cell, increased O2    Comparison: Chest radiograph 2/28/2021.    Findings:   AP portable upright chest radiograph. Left-sided Port-A-Cath with tip  in the low SVC towards the cavoatrial junction. Linear density  overlying the left lower thorax outside of the patient. There is also  a suspected clothing artifact overlying the thoracic inlet.    Heart size appears slightly more prominent although likely due to the  AP versus PA technique. No acute pleural abnormality noted. No  consolidation. There is likely some minor basilar atelectasis. No  pneumothorax.      Impression    Impression: There may be some minor basal atelectasis demonstrated.  Otherwise stable appearing radiograph.    KALI ROSARIO MD   XR Chest 2 Views    Narrative    Exam: XR CHEST 2 VW, 3/8/2021 8:52 AM    Indication: possible acute chest    Comparison: Chest x-ray 3/6/2021    Findings:   AP and lateral views the chest are obtained. Left-sided internal  jugular port in place with tip terminating at the cavoatrial junction.  The trachea is midline. Mediastinum is within normal limits. The  cardiopulmonary silhouette is not enlarged. Bilateral lung fields  appear clear. There is no pneumothorax or pleural effusion.  Cholecystectomy clip projects over the right upper quadrant. Linear  density over the mid abdomen is presumed external to the patient.       Impression    Impression: No acute radiographic abnormality.    I have personally reviewed the examination and initial interpretation  and I agree with the findings.    KEN BARBOUR, DO   XR Chest 2 Views    Narrative    Exam: XR CHEST 2 VW, 3/10/2021 12:17 PM    Indication: r/o any infiltrates . a/c chest syndrome    Comparison: Chest x-ray 3/8/2021    Findings:   Upright PA and lateral views chest are obtained. Left  chest  Port-A-Cath in place with tip terminating at the cavoatrial junction.  Trachea is midline. Mediastinum is within normal limits. The  cardiopulmonary silhouette is not enlarged. Lungs are clear. There is  no pneumothorax or pleural effusion. Cholecystectomy clips in the  right upper quadrant. No acute osseous abnormality.      Impression    Impression: No acute airspace disease.    I have personally reviewed the examination and initial interpretation  and I agree with the findings.    MARY JANE JUÁREZ MD   ,   BMP  Recent Labs   Lab 03/14/21  0724 03/13/21  0747 03/12/21  0745    139 142   POTASSIUM 3.4 3.6 3.4   CHLORIDE 107 108 113*   MICAH 8.3* 8.4* 8.4*   CO2 23 24 24   BUN 8 10 7   CR 0.55 0.59 0.51*   GLC 84 81 83     CBC  Recent Labs   Lab 03/16/21  0706 03/15/21  0749 03/14/21  0724 03/13/21  0747   WBC 5.7 6.4 5.6 6.0   RBC 1.96* 1.94* 2.07* 1.95*   HGB 6.7* 6.6* 6.8* 6.5*   HCT 19.5* 19.5* 20.3* 19.0*    101* 98 97   MCH 34.2* 34.0* 32.9 33.3*   MCHC 34.4 33.8 33.5 34.2   RDW 30.4* 31.4* 32.7* Dimorphic population - unable to calculate    273 308 342     INRNo lab results found in last 7 days.  LFTsNo lab results found in last 7 days.   PANCNo lab results found in last 7 days.      Discharge Medications   Current Discharge Medication List      CONTINUE these medications which have CHANGED    Details   JADENU 360 MG tablet Take 4 tablets (1,440 mg) by mouth every evening  Qty: 30 tablet, Refills: 0    Associated Diagnoses: Iron overload due to repeated red blood cell transfusions         CONTINUE these medications which have NOT CHANGED    Details   acetaminophen (TYLENOL) 325 MG tablet Take 2 tablets (650 mg) by mouth every 6 hours as needed for mild pain  Qty: 120 tablet, Refills: 3    Associated Diagnoses: Sickle cell crisis (H)      albuterol (PROAIR HFA/PROVENTIL HFA/VENTOLIN HFA) 108 (90 Base) MCG/ACT inhaler Inhale 2 puffs into the lungs every 6 hours as needed for  shortness of breath / dyspnea or wheezing  Qty: 8.5 g, Refills: 3    Comments: Pharmacy may dispense brand covered by insurance (Proair, or proventil or ventolin or generic albuterol inhaler)  Associated Diagnoses: Asthmatic bronchitis without complication, unspecified asthma severity, unspecified whether persistent      albuterol (PROVENTIL) (2.5 MG/3ML) 0.083% neb solution Take 1 vial (2.5 mg) by nebulization every 6 hours as needed for shortness of breath / dyspnea or wheezing  Qty: 12 mL, Refills: 4    Associated Diagnoses: 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); Asthmatic bronchitis without complication, unspecified asthma severity, unspecified whether persistent      aspirin (ASPIRIN) 81 MG EC tablet Take 1 tablet (81 mg) by mouth daily . HOLD while on Xarelto  Qty: 90 tablet, Refills: 3    Associated Diagnoses: History of stroke      budesonide-formoterol (SYMBICORT) 160-4.5 MCG/ACT Inhaler Inhale 2 puffs into the lungs 2 times daily  Qty: 10.2 g, Refills: 3    Associated Diagnoses: Asthmatic bronchitis without complication, unspecified asthma severity, unspecified whether persistent      celecoxib (CELEBREX) 100 MG capsule Take 1 capsule (100 mg) by mouth 2 times daily  Qty: 60 capsule, Refills: 3    Associated Diagnoses: 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)      diphenhydrAMINE (BENADRYL) 25 MG capsule Take 1-2 capsules (25-50 mg) by mouth nightly as needed for sleep  Qty: 60 capsule, Refills: 3    Associated Diagnoses: Insomnia, unspecified type      EPINEPHrine (ANY BX GENERIC EQUIV) 0.3 MG/0.3ML injection 2-pack Inject 0.3 mLs (0.3 mg) into the muscle as needed for anaphylaxis  Qty: 1 each, Refills: 1    Associated Diagnoses: Anaphylaxis, sequela      Hydroxyurea 1000 MG TABS Take 2,000 mg by mouth daily  Qty: 60 tablet, Refills: 3    Associated Diagnoses: 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)      medroxyPROGESTERone (DEPO-PROVERA) 150 MG/ML IM injection Inject 150 mg into the muscle      naloxone (NARCAN) 4 MG/0.1ML  nasal spray Spray 1 spray (4 mg) into one nostril alternating nostrils once as needed for opioid reversal every 2-3 minutes until assistance arrives  Qty: 0.2 mL, Refills: 1    Associated Diagnoses: Sickle cell crisis (H)      ondansetron (ZOFRAN) 8 MG tablet       oxyCODONE (OXYCONTIN) 10 MG 12 hr tablet Take 1 tablet (10 mg) by mouth every morning  Qty: 30 tablet, Refills: 0    Associated Diagnoses: Sickle cell pain crisis (H)      oxyCODONE IR (ROXICODONE) 15 MG tablet Take 1 tablet (15 mg) by mouth every 6 hours as needed for severe pain  Qty: 60 tablet, Refills: 0    Associated Diagnoses: Sickle cell pain crisis (H)      rivaroxaban ANTICOAGULANT (XARELTO) 20 MG TABS tablet Take 1 tablet (20 mg) by mouth daily (with dinner)  Qty: 63 tablet, Refills: 0    Associated Diagnoses: Other acute pulmonary embolism without acute cor pulmonale (H)      sertraline (ZOLOFT) 100 MG tablet Take 2 tablets (200 mg) by mouth daily  Qty: 180 tablet, Refills: 3    Associated Diagnoses: 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); Anxiety           Allergies   Allergies   Allergen Reactions     Fish-Derived Products Hives     Seafood Hives     Contrast Dye      Diagnostic X-Ray Materials      Gadolinium

## 2021-03-16 NOTE — PLAN OF CARE
Assumed pt at 1500, at around 1600 pt stated she felt ready for discharge, her pain was well managed and that she feels comfortable with her Q6 oxycodone at home. VSS, slightly tachy yet within parameters. Denies nausea. PIV taken out. Port hep locked and deaccessed without complications. AVS packet reviewed with pt, no further questions. Pt to  discharge meds at home pharmacy. Pt's family member picked pt up in care to discharge home. Pt escorted via wheelchair. Discharged at 1720.

## 2021-03-16 NOTE — PLAN OF CARE
AVSS on room air. A+Ox4. Ambulating SBA in hallways, independent to bedside commode. Morphine PCA discontinued, pain controlled with oxy q4h (15 mg).  Port infusing deferoxamine @ 41.7 ml/hr. L PIV SL. Hgb 6.7, pt asymptomatic and ok per hematology. Tolerating regular diet w/o nausea or vomiting. Reports minimal appetite today. Had a large, soft BM today. Voiding spontaneously in adequate amts w/o difficulty. Plan to discharge tomorrow to home pending adequate pain control with oral analgesics. Will continue with POC.

## 2021-03-16 NOTE — PROGRESS NOTES
Hematology  Daily Progress Note   Date of Service: 03/16/2021  Patient: Jennifer Cervantes  MRN: [id number]  Admission Date: 3/4/2021  Hospital Day # 12  Hematology Diagnosis: Sickle Cell Disease (HgbSS)   Primary Outpatient Hematologist: Dr. Duncan      Recommendations:   - Jadenu held on admission given patient was out of medication and inpatient Desferal infusions started. Ok to restart on discharge, may need to send new script.    - No indication for transfusion.   - Agree with weaning PCA per primary team with transition to oral pain medication.   - Agree with discharge planning per primary team.   - Continue to encourage ambulation and use of IS when in bed.  - Follow-up:               - Appt. With Dr. Duncan is scheduled on 3/26              - We will keep her scheduled daily Desferal infusion appts through 3/19 upon discharge.       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    #Sickle Cell pain crisis  Overall clinically improving, despite minor set-back in pain 3/13. Patient open to beginning transition to oral pain medications on 3/15. PCA basal rate discontinued, bolus dosing 0.6mg Q15 min prn and Oxy 15mg Q6H prn available per primary team.   - Agree with weaning PCA per primary team with transition to oral pain medication.   - Encourage ambulation and use of IS when in bed.    #Iron Overload  Ferritin was elevated at 11,514 on 3/8. Recheck on 3/13 down to 6,754. Jadenu on hold for now in setting of Desreral below.   - Continue desferal while inpatient.  - We will keep her scheduled daily Desferal infusion appts through 3/19 upon discharge.   - Jadenu held on admission given patient was out of medication  "and inpatient Desferal infusions started. Ok to restart on discharge, may need to send new script.    #PE (dx 2/1/21)  Continues on Xarelto with plan for 3 months of treatment.      Follow-up:   - pt currently has follow-up with primary Hematologist, Dr. Duncan, on 3/26  - keep her currently scheduled daily infusion appointments 3/15-3/19 (for Desferal) on discharge.       Patient and plan of care was discussed with attending physician Dr. Villagran.    Thank you for the opportunity to partake in this patients plan of care. Please do not hesitate to page with questions. We will continue to follow.     Eulalia Mosley PA-C   Hematology/Oncology   Pager: [phone]   ___________________________________________________________________    Subjective & Interval History:    - Afebrile and VSS on RA.   - Pain under good control this morning after discontinuation of basal rate of PCA yesterday. She did use a couple of bolus doses overnight to stay \"on top of pain\".   - She has continued to move around.     Physical Exam:    Blood pressure 114/47, pulse 80, temperature 98.8  F (37.1  C), temperature source Oral, resp. rate 18, height 1.626 m (5' 4\"), weight 75 kg (165 lb 4.8 oz), SpO2 95 %, not currently breastfeeding.    Gen: Pleasant female seen lying in bed, NAD. Awake, alert, interactive.  HEENT: NC/AT. Nasal cannula in place  Pulm: Breathing comfortably on room air. CTA b/l.  CV: Normal rate, regular  Abd: Soft, NT/ND, no rebound/guarding.  Ext: Baseline R hand contracture. No lower extremity edema.   Skin: No lesions or rashes on exposed skin surfaces of limited exam.   Neuro: Alert and in no acute distress, with no deficits or concerning affect    Labs & Studies: I personally reviewed the following studies:  Hb stable, 6.7 from 6.6 yesterday  Creatinine is normal    ROUTINE LABS (Last four results):  CMP  Recent Labs   Lab 03/14/21  0724 03/13/21  0747 03/12/21  0745 03/10/21  0643    139 142 143   POTASSIUM 3.4 3.6 3.4 " 3.7   CHLORIDE 107 108 113* 112*   CO2 23 24 24 24   ANIONGAP 7 7 5 7   GLC 84 81 83 80   BUN 8 10 7 7   CR 0.55 0.59 0.51* 0.56   GFRESTIMATED >90 >90 >90 >90   GFRESTBLACK >90 >90 >90 >90   MICAH 8.3* 8.4* 8.4* 8.5   PROTTOTAL  --   --   --  7.1   ALBUMIN  --   --   --  3.1*   BILITOTAL  --   --   --  3.0*   ALKPHOS  --   --   --  71   AST  --   --   --  104*   ALT  --   --   --  108*     CBC  Recent Labs   Lab 03/16/21  0706 03/15/21  0749 03/14/21  0724 03/13/21  0747   WBC 5.7 6.4 5.6 6.0   RBC 1.96* 1.94* 2.07* 1.95*   HGB 6.7* 6.6* 6.8* 6.5*   HCT 19.5* 19.5* 20.3* 19.0*    101* 98 97   MCH 34.2* 34.0* 32.9 33.3*   MCHC 34.4 33.8 33.5 34.2   RDW 30.4* 31.4* 32.7* Dimorphic population - unable to calculate    273 308 342     INRNo lab results found in last 7 days.    Medications list for reference:  Current Facility-Administered Medications   Medication     acetaminophen (TYLENOL) tablet 650 mg     albuterol (PROAIR HFA/PROVENTIL HFA/VENTOLIN HFA) 108 (90 Base) MCG/ACT inhaler 2 puff     celecoxib (celeBREX) capsule 100 mg     deferoxamine (DESFERAL) 3,000 mg in sodium chloride 0.9 % 500 mL intermittent infusion     diphenhydrAMINE (BENADRYL) capsule 25-50 mg     docusate sodium (COLACE) capsule 100 mg     fluticasone-vilanterol (BREO ELLIPTA) 200-25 MCG/INH inhaler 1 puff     folic acid (FOLVITE) tablet 1 mg     hydroxyurea (HYDREA) capsule 2,000 mg     influenza quadrivalent (PF) vacc (FLUZONE) injection 0.5 mL     Lidocaine (LIDOCARE) 4 % Patch 1-3 patch    And     lidocaine patch in PLACE     lidocaine (LMX4) cream     lidocaine (XYLOCAINE) 2 % 15 mL, alum & mag hydroxide-simethicone (MAALOX) 15 mL GI Cocktail     lidocaine 1 % 0.1-1 mL     melatonin tablet 1 mg     morphine  PCA 1 mg/mL OPIOID TOLERANT     ondansetron (ZOFRAN-ODT) ODT tab 4 mg    Or     ondansetron (ZOFRAN) injection 4 mg     oxyCODONE (ROXICODONE) tablet 15 mg     pantoprazole (PROTONIX) EC tablet 40 mg     Patient is already  receiving anticoagulation with heparin, enoxaparin (LOVENOX), warfarin (COUMADIN)  or other anticoagulant medication     polyethylene glycol (MIRALAX) Packet 17 g     rivaroxaban ANTICOAGULANT (XARELTO) tablet 20 mg     senna-docusate (SENOKOT-S/PERICOLACE) 8.6-50 MG per tablet 1 tablet    Or     senna-docusate (SENOKOT-S/PERICOLACE) 8.6-50 MG per tablet 2 tablet     senna-docusate (SENOKOT-S/PERICOLACE) 8.6-50 MG per tablet 1 tablet    Or     senna-docusate (SENOKOT-S/PERICOLACE) 8.6-50 MG per tablet 2 tablet     sertraline (ZOLOFT) tablet 200 mg     sodium chloride (PF) 0.9% PF flush 3 mL     sodium chloride (PF) 0.9% PF flush 3 mL     sodium chloride 0.9% infusion

## 2021-03-16 NOTE — PROGRESS NOTES
Physician Attestation   I, Brian Sheehan MD, was present with the medical/ANDREAS student who participated in the service and in the documentation of the note.  I have verified the history and personally performed the physical exam and medical decision making.  I agree with the assessment and plan of care as documented in the note.      I personally reviewed vital signs, medications and labs.    {Key findings; pain better but rates as 8/10 after discontinuing PCA  - increase Oxycodone to 15mg Q4 hr prn  - ct to monitor for adequate pain control before discharge. 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 Clinically improving overall, will continue oral PTA pain regimen.     Plan for Today:   - Wean PCA basal rate and continue oral PTA oxycodone 15mg q6  - Monitor closely.  Hemoglobin continues to be low but stable, continue to hold transfusion until recommended by hematology   - Encourage ambulation, likely discharge tomorrow if tolerating changes in medication and has no s/s of acute chest      #Lower Back pain, improving and well controlled with current PCA regimen.  - Continue Lidocaine patch      Sickle cell pain crisis  Hx iron overload   Hypoxic respiratory failure  Initially presented with 1 day worsening generalized pain, unable to be managed on PO regimen at home.  Hgb 8.3, %retic 14.9, absolute retic 443.7 on admission.COVID neg. On chart review, has had two ED visits for pain in the last 1.5  weeks.  Last seen in Hematology clinic on 02/24/21 due to more frequent episodes of pain. On Oxycodone 15mg PRN, Celebrex PRN, and APAP PRN at home. Recently stopped OxyContin. Currently afebrile and stable on RA. Last episode of acute chest in 10/2019. 3/15 reticulocyte down trending, from 21.1->19.1. Hgb stable at 6.7, patient is clinically improving overall.  - Hematology consulted, recs appreciated  - Will avoid Toradol for additional pain control as pt on Rivaroxaban  - Celebrex scheduled for additional pain control  - Antiemetics w/ Zofran PRN   - Benadryl PRN   - Bowel regimen w/ docusate and senna   - Continue PTA Hydroxyurea  - Lidoderm patches PRN for back pain  - Daily CBC w/ diff, retics  - Holding Jadenu for now pending ferritin recheck per above  - Low threshold for CXR, EKG, if new fever or hypoxia   - No transfusion unless discussion w/ Heme; if increased O2 needs, call heme, to consider exchange transfusion       Elevated LFTs  Iron Overload:  Chelation per heme, Desferal 3g daily for 6 days - Repeat ferritin on 3/13 down trending from 11,514 -> 6,754.  - Continue desferal while inpatient  - Consulted Hematology, appreciate recs   -awaiting if we can discontinue desferal and restart her Jadenu PTA med    PE - V/Q scan on 1/31 initially read as negative, re-read on 2/1 positive for acute PE.  Started on Rivaroxaban at that time.  Currently stable on room air.    - Continue Rivaroxaban 20mg every evening      Hx CVA (left MCA w/ R hemiparesis and RUE contracture) - Per chart review, had a stroke at age 1 and TIA in 2014.  Recently stopped ASA per Hematology recs.  Has RUE contracture at baseline and cognitive delay.    - Continue to hold ASA while on Rivaroxaban     Asthma - Stable.  Denies dyspnea and cough.   - Continue PTA Symbicort and PRN Albuterol       Depression, anxiety - Patient with non-depressed affect at present.  - Continue PTA Sertraline 200mg daily      Diet: Regular Diet Adult    DVT  "Prophylaxis: Xarelto  Lopez Catheter: not present  Code Status: Full Code         Disposition Plan   Expected discharge: Later Today, recommended to prior living arrangement once adequate pain management/ tolerating PO medications.  Entered: Gino Zamora 03/16/2021, 1:09 PM     The patient's care was discussed with the Bedside Nurse, Care Coordinator/, Patient and Hematology Consultant.    Gino Zamora, MS4  Hospitalist Service, [address]  Contact information available via [facility] Paging/Directory  Please see sign in/sign out for up to date coverage information  ______________________________________________________________________    Interval History    Patient feeling good this morning, eating fine and tolerating regular diet. Last BM this morning.she notes lower back pain is still there but improving. She believes that her current pain medication are adequate. Denies any tingling, numbness, pain radiating down her legs or loss of urine or bowel movement.     ROS: Denies any fever, chest pain, shortness of breath and has not required any oxygen in the past 24 hours.     Data reviewed today: I reviewed all medications, new labs and imaging results over the last 24 hours. I personally reviewed no images or EKG's today.    Physical Exam   /47 (BP Location: Left arm)   Pulse 80   Temp 98.8  F (37.1  C) (Oral)   Resp 18   Ht 1.626 m (5' 4\")   Wt 75 kg (165 lb 4.8 oz)   SpO2 95%   BMI 28.37 kg/m       GENERAL: laying in bed comfortably in no acute distress  Neck: supple, no JVD, or thyromegaly  Cardiac: regular rate, rhythm, no murmurs, rubs or gallops  Lungs: clear to ascultation bilaterally, no wheezing or crackles  Abdomen: Soft, non tender, non distended  Extremities: No rash or edema  CNS: No focal neurological deficit      Data    BMP  Recent Labs   Lab 03/14/21  0724 03/13/21  0747 03/12/21  0745 03/10/21  0643    139 " 142 143   POTASSIUM 3.4 3.6 3.4 3.7   CHLORIDE 107 108 113* 112*   MICAH 8.3* 8.4* 8.4* 8.5   CO2 23 24 24 24   BUN 8 10 7 7   CR 0.55 0.59 0.51* 0.56   GLC 84 81 83 80     CBC  Recent Labs   Lab 03/16/21  0706 03/15/21  0749 03/14/21  0724 03/13/21  0747   WBC 5.7 6.4 5.6 6.0   RBC 1.96* 1.94* 2.07* 1.95*   HGB 6.7* 6.6* 6.8* 6.5*   HCT 19.5* 19.5* 20.3* 19.0*    101* 98 97   MCH 34.2* 34.0* 32.9 33.3*   MCHC 34.4 33.8 33.5 34.2   RDW 30.4* 31.4* 32.7* Dimorphic population - unable to calculate    273 308 342     INRNo lab results found in last 7 days.  LFTs  Recent Labs   Lab 03/10/21  0643   ALKPHOS 71   *   *   BILITOTAL 3.0*   PROTTOTAL 7.1   ALBUMIN 3.1*      PANCNo lab results found in last 7 days.    No results found for this or any previous visit (from the past 24 hour(s)).

## 2021-03-17 ENCOUNTER — APPOINTMENT (OUTPATIENT)
Dept: ULTRASOUND IMAGING | Facility: CLINIC | Age: 21
End: 2021-03-17
Attending: EMERGENCY MEDICINE
Payer: COMMERCIAL

## 2021-03-17 ENCOUNTER — HOSPITAL ENCOUNTER (EMERGENCY)
Facility: CLINIC | Age: 21
Discharge: HOME | End: 2021-03-18
Attending: EMERGENCY MEDICINE | Admitting: EMERGENCY MEDICINE
Payer: COMMERCIAL

## 2021-03-17 ENCOUNTER — TELEPHONE (OUTPATIENT)
Dept: ONCOLOGY | Facility: CLINIC | Age: 21
End: 2021-03-17

## 2021-03-17 DIAGNOSIS — M79.652 BILATERAL THIGH PAIN: ICD-10-CM

## 2021-03-17 DIAGNOSIS — M79.651 BILATERAL THIGH PAIN: ICD-10-CM

## 2021-03-17 DIAGNOSIS — M54.50 ACUTE BILATERAL LOW BACK PAIN WITHOUT SCIATICA: ICD-10-CM

## 2021-03-17 DIAGNOSIS — D57.00 SICKLE CELL PAIN CRISIS (H): ICD-10-CM

## 2021-03-17 LAB
ALBUMIN SERPL-MCNC: 4.3 G/DL (ref 3.4–5)
ALP SERPL-CCNC: 69 U/L (ref 40–150)
ALT SERPL W P-5'-P-CCNC: 69 U/L (ref 0–50)
ANION GAP SERPL CALCULATED.3IONS-SCNC: 10 MMOL/L (ref 3–14)
ANISOCYTOSIS BLD QL SMEAR: ABNORMAL
AST SERPL W P-5'-P-CCNC: 85 U/L (ref 0–45)
BASOPHILS # BLD AUTO: 0 10E9/L (ref 0–0.2)
BASOPHILS NFR BLD AUTO: 0 %
BILIRUB SERPL-MCNC: 3.3 MG/DL (ref 0.2–1.3)
BUN SERPL-MCNC: 8 MG/DL (ref 7–30)
CALCIUM SERPL-MCNC: 9.2 MG/DL (ref 8.5–10.1)
CHLORIDE SERPL-SCNC: 106 MMOL/L (ref 94–109)
CO2 SERPL-SCNC: 19 MMOL/L (ref 20–32)
CREAT SERPL-MCNC: 0.58 MG/DL (ref 0.52–1.04)
DIFFERENTIAL METHOD BLD: ABNORMAL
EOSINOPHIL # BLD AUTO: 0 10E9/L (ref 0–0.7)
EOSINOPHIL NFR BLD AUTO: 0 %
ERYTHROCYTE [DISTWIDTH] IN BLOOD BY AUTOMATED COUNT: ABNORMAL % (ref 10–15)
GFR SERPL CREATININE-BSD FRML MDRD: >90 ML/MIN/{1.73_M2}
GLUCOSE SERPL-MCNC: 95 MG/DL (ref 70–99)
HCG SERPL QL: NEGATIVE
HCT VFR BLD AUTO: 20.8 % (ref 35–47)
HGB BLD-MCNC: 7.3 G/DL (ref 11.7–15.7)
INR PPP: 1.38 (ref 0.86–1.14)
LYMPHOCYTES # BLD AUTO: 1.3 10E9/L (ref 0.8–5.3)
LYMPHOCYTES NFR BLD AUTO: 13.6 %
MACROCYTES BLD QL SMEAR: PRESENT
MCH RBC QN AUTO: 35.3 PG (ref 26.5–33)
MCHC RBC AUTO-ENTMCNC: 35.1 G/DL (ref 31.5–36.5)
MCV RBC AUTO: 101 FL (ref 78–100)
MONOCYTES # BLD AUTO: 0.2 10E9/L (ref 0–1.3)
MONOCYTES NFR BLD AUTO: 1.8 %
NEUTROPHILS # BLD AUTO: 7.8 10E9/L (ref 1.6–8.3)
NEUTROPHILS NFR BLD AUTO: 84.6 %
NRBC # BLD AUTO: 0.6 10*3/UL
NRBC BLD AUTO-RTO: 6 /100
PLATELET # BLD AUTO: 352 10E9/L (ref 150–450)
PLATELET # BLD EST: ABNORMAL 10*3/UL
POIKILOCYTOSIS BLD QL SMEAR: SLIGHT
POLYCHROMASIA BLD QL SMEAR: SLIGHT
POTASSIUM SERPL-SCNC: 3.3 MMOL/L (ref 3.4–5.3)
PROT SERPL-MCNC: 9.1 G/DL (ref 6.8–8.8)
RBC # BLD AUTO: 2.07 10E12/L (ref 3.8–5.2)
RETICS # AUTO: 260.8 10E9/L (ref 25–95)
RETICS/RBC NFR AUTO: 12.4 % (ref 0.5–2)
SICKLE CELLS BLD QL SMEAR: ABNORMAL
SODIUM SERPL-SCNC: 135 MMOL/L (ref 133–144)
TARGETS BLD QL SMEAR: SLIGHT
WBC # BLD AUTO: 9.2 10E9/L (ref 4–11)

## 2021-03-17 PROCEDURE — 93970 EXTREMITY STUDY: CPT | Mod: 26 | Performed by: RADIOLOGY

## 2021-03-17 PROCEDURE — 96374 THER/PROPH/DIAG INJ IV PUSH: CPT | Performed by: EMERGENCY MEDICINE

## 2021-03-17 PROCEDURE — 85045 AUTOMATED RETICULOCYTE COUNT: CPT | Performed by: EMERGENCY MEDICINE

## 2021-03-17 PROCEDURE — 99285 EMERGENCY DEPT VISIT HI MDM: CPT | Performed by: EMERGENCY MEDICINE

## 2021-03-17 PROCEDURE — 96375 TX/PRO/DX INJ NEW DRUG ADDON: CPT | Performed by: EMERGENCY MEDICINE

## 2021-03-17 PROCEDURE — 80053 COMPREHEN METABOLIC PANEL: CPT | Performed by: EMERGENCY MEDICINE

## 2021-03-17 PROCEDURE — 93970 EXTREMITY STUDY: CPT

## 2021-03-17 PROCEDURE — 96361 HYDRATE IV INFUSION ADD-ON: CPT | Performed by: EMERGENCY MEDICINE

## 2021-03-17 PROCEDURE — 84703 CHORIONIC GONADOTROPIN ASSAY: CPT | Performed by: EMERGENCY MEDICINE

## 2021-03-17 PROCEDURE — 99285 EMERGENCY DEPT VISIT HI MDM: CPT | Mod: 25 | Performed by: EMERGENCY MEDICINE

## 2021-03-17 PROCEDURE — 96376 TX/PRO/DX INJ SAME DRUG ADON: CPT | Performed by: EMERGENCY MEDICINE

## 2021-03-17 PROCEDURE — 250N000011 HC RX IP 250 OP 636: Performed by: EMERGENCY MEDICINE

## 2021-03-17 PROCEDURE — 85610 PROTHROMBIN TIME: CPT | Performed by: EMERGENCY MEDICINE

## 2021-03-17 PROCEDURE — 85025 COMPLETE CBC W/AUTO DIFF WBC: CPT | Performed by: EMERGENCY MEDICINE

## 2021-03-17 PROCEDURE — 258N000003 HC RX IP 258 OP 636: Performed by: EMERGENCY MEDICINE

## 2021-03-17 PROCEDURE — 250N000013 HC RX MED GY IP 250 OP 250 PS 637: Performed by: EMERGENCY MEDICINE

## 2021-03-17 RX ORDER — ONDANSETRON 2 MG/ML
4 INJECTION INTRAMUSCULAR; INTRAVENOUS
Status: COMPLETED | OUTPATIENT
Start: 2021-03-17 | End: 2021-03-17

## 2021-03-17 RX ORDER — KETOROLAC TROMETHAMINE 15 MG/ML
15 INJECTION, SOLUTION INTRAMUSCULAR; INTRAVENOUS ONCE
Status: COMPLETED | OUTPATIENT
Start: 2021-03-17 | End: 2021-03-17

## 2021-03-17 RX ORDER — DIPHENHYDRAMINE HCL 25 MG
25 CAPSULE ORAL ONCE
Status: COMPLETED | OUTPATIENT
Start: 2021-03-17 | End: 2021-03-17

## 2021-03-17 RX ORDER — MORPHINE SULFATE 2 MG/ML
2 INJECTION, SOLUTION INTRAMUSCULAR; INTRAVENOUS
Status: COMPLETED | OUTPATIENT
Start: 2021-03-17 | End: 2021-03-17

## 2021-03-17 RX ORDER — DIPHENHYDRAMINE HCL 25 MG
25 CAPSULE ORAL
Status: DISCONTINUED | OUTPATIENT
Start: 2021-03-17 | End: 2021-03-17

## 2021-03-17 RX ADMIN — MORPHINE SULFATE 2 MG: 2 INJECTION, SOLUTION INTRAMUSCULAR; INTRAVENOUS at 21:10

## 2021-03-17 RX ADMIN — SODIUM CHLORIDE 1000 ML: 9 INJECTION, SOLUTION INTRAVENOUS at 21:11

## 2021-03-17 RX ADMIN — MORPHINE SULFATE 2 MG: 2 INJECTION, SOLUTION INTRAMUSCULAR; INTRAVENOUS at 22:27

## 2021-03-17 RX ADMIN — DIPHENHYDRAMINE HYDROCHLORIDE 25 MG: 25 CAPSULE ORAL at 21:28

## 2021-03-17 RX ADMIN — KETOROLAC TROMETHAMINE 15 MG: 15 INJECTION, SOLUTION INTRAMUSCULAR; INTRAVENOUS at 21:10

## 2021-03-17 RX ADMIN — MORPHINE SULFATE 2 MG: 2 INJECTION, SOLUTION INTRAMUSCULAR; INTRAVENOUS at 23:46

## 2021-03-17 RX ADMIN — ONDANSETRON 4 MG: 2 INJECTION INTRAMUSCULAR; INTRAVENOUS at 21:10

## 2021-03-17 ASSESSMENT — MIFFLIN-ST. JEOR: SCORE: 1493.44

## 2021-03-17 NOTE — ED TRIAGE NOTES
"Pt got out of hospital yesterday, wasn't able to  oxycontin yesterday. Pt having sickle cell pain in her thighs, legs, lower back. Weak & a \"little short of breath\"  "

## 2021-03-17 NOTE — PROGRESS NOTES
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        Jennifer is resting in bed.  She reports worsening generalized pain for the last 24 hours.  She says that she hurts \"all over\" and can't really localize any area of her body that hurts the most, though chart review indicates she has had neck pain and back pain.  She denies chest pain, dyspnea, cough, nausea, vomiting, diarrhea, constipation, dysuria, and fevers.  She denies any known triggers for her pain crises.  Denies bleeding issues w/ Rivaroxaban.  Eating and drinking well.  Agreeable to pain management w/ PCA.      ASSESSMENT      Discharge Assessment  Patient reports symptoms are: Other(Patient reports ongoing pain, \"just laying in bed resting right now\".)  Does the patient have all of their medications?: No(Patient reports she did not pick her Oxycodone up last night and doesn't have anyone available to pick it up for her today.)  Does patient know what their new medications are for?: Not applicable  Does patient have a follow-up appointment scheduled?: Yes  Does patient have any other questions or concerns?: No    Post-op  Did the patient have surgery or a procedure: No  Fever: No  Chills: No  Eating & Drinking: eating and drinking without complaints/concerns  PO Intake: regular diet  Bowel Function: normal  Urinary Status: voiding without complaint/concerns    PLAN                               "                        Outpatient Plan:      Follow up with primary care provider, Suraj Case, within 7 days for hospital follow- up.        Follow up with Hematologist , at (location with clinic name or city) [facility] , as planned  .     Future Appointments   Date Time Provider Department Center   3/25/2021  9:30 AM [id number]   3/25/2021 11:00 AM UR1 [facility]   3/26/2021 12:30 PM  [facility]   3/26/2021  1:00 PM Eric Duncan MD [facility]           Naida Fonseca, CMA

## 2021-03-17 NOTE — PROGRESS NOTES
"The patient was contacted for a post-hospital call and reports ongoing pain. Patient states she is \"just resting and laying in bed now but I am having pain\". Patient was not able to  her pain medication last night due to \"being too tired and the pharmacy closed at 6pm\". Patient reports she has no one to  the Oxycodone for her today and is \"unsure what her plan will be, maybe come back to the ER\".    Please contact patient to discuss further.   "

## 2021-03-17 NOTE — TELEPHONE ENCOUNTER
Pt called in to triage and stated she thought oxycontin was sent to [address] [place] [address], she asked if anyway Dr. Duncan could prescribe closer to home. Informed her it was actually sent to the Discharge pharmacy, informed her why don't she check first if they can either ship to home address or , provided her phone number to call ([phone]), she stated she would. No further questions.

## 2021-03-17 NOTE — TELEPHONE ENCOUNTER
[facility] Telephone Triage Note     The following symptoms were reported:    Typical    Description:            Onset:  Last night, recently discharge from hospital on 3/16  Location: back  Character: Sharp           Intensity: severe     Accompanying Signs & Symptoms:  none               Chest Pain:  denies     Shortness of Breath:  denies     Fever:  denies     Chills:  denies   Cough/sore throat:  denies  Other:  denies     Therapies Tried and outcome: She tried celebrex,oxy, IBUprofen and tylenol, last doses taken around 8:00am and taking warm bath/shower without relief     Improved by: nothing     The following provider was consulted: 11:52am per Andrei HIGGINS  Approved Labs IVF/Pain, if unable to get in today for infusion then patient able to take Oxycodone every 4 hours until pain under control.  If still uncontrolled pain, go to ER.     Patient instructions and/or follow up:  Educated Jennifer that currently, there is no availability for infusion as of 11:50am will leave name on waitlist if provider approves in case something opens up but does not look like we will be able to get her in today. She voiced understanding and will either go to [facility] or call back tomorrow morning.     At 2:58pm this writer educated left message for pt to call back a/b update on plan as no infusion openings today.  At 3:08pm Pt continues have pain, reports being tearful, had tried oxycodone every 4hours and is electing to go to ER.     [facility] to give hand off report to Ryan DIAZ.

## 2021-03-18 ENCOUNTER — TELEPHONE (OUTPATIENT)
Dept: ONCOLOGY | Facility: CLINIC | Age: 21
End: 2021-03-18

## 2021-03-18 ENCOUNTER — INFUSION THERAPY VISIT (OUTPATIENT)
Dept: ONCOLOGY | Facility: CLINIC | Age: 21
End: 2021-03-18
Attending: PEDIATRICS
Payer: COMMERCIAL

## 2021-03-18 ENCOUNTER — PATIENT OUTREACH (OUTPATIENT)
Dept: ONCOLOGY | Facility: CLINIC | Age: 21
End: 2021-03-18

## 2021-03-18 ENCOUNTER — PATIENT OUTREACH (OUTPATIENT)
Dept: CARE COORDINATION | Facility: CLINIC | Age: 21
End: 2021-03-18

## 2021-03-18 VITALS
RESPIRATION RATE: 18 BRPM | HEART RATE: 80 BPM | DIASTOLIC BLOOD PRESSURE: 75 MMHG | SYSTOLIC BLOOD PRESSURE: 130 MMHG | TEMPERATURE: 98.3 F | OXYGEN SATURATION: 96 %

## 2021-03-18 VITALS
RESPIRATION RATE: 16 BRPM | DIASTOLIC BLOOD PRESSURE: 55 MMHG | OXYGEN SATURATION: 98 % | WEIGHT: 165 LBS | TEMPERATURE: 99 F | HEIGHT: 64 IN | HEART RATE: 110 BPM | BODY MASS INDEX: 28.17 KG/M2 | SYSTOLIC BLOOD PRESSURE: 114 MMHG

## 2021-03-18 DIAGNOSIS — D57.00 SICKLE CELL PAIN CRISIS (H): Primary | ICD-10-CM

## 2021-03-18 DIAGNOSIS — D57.00 SICKLE CELL PAIN CRISIS (H): ICD-10-CM

## 2021-03-18 PROCEDURE — 96376 TX/PRO/DX INJ SAME DRUG ADON: CPT

## 2021-03-18 PROCEDURE — 96361 HYDRATE IV INFUSION ADD-ON: CPT

## 2021-03-18 PROCEDURE — 96374 THER/PROPH/DIAG INJ IV PUSH: CPT

## 2021-03-18 PROCEDURE — 258N000003 HC RX IP 258 OP 636: Performed by: PEDIATRICS

## 2021-03-18 PROCEDURE — 250N000011 HC RX IP 250 OP 636: Performed by: PEDIATRICS

## 2021-03-18 PROCEDURE — 250N000011 HC RX IP 250 OP 636: Performed by: EMERGENCY MEDICINE

## 2021-03-18 RX ORDER — DIPHENHYDRAMINE HCL 25 MG
25 CAPSULE ORAL
Status: CANCELLED
Start: 2021-03-18

## 2021-03-18 RX ORDER — OXYCODONE HCL 10 MG/1
10 TABLET, FILM COATED, EXTENDED RELEASE ORAL EVERY MORNING
Qty: 30 TABLET | Refills: 0 | Status: SHIPPED | OUTPATIENT
Start: 2021-03-18 | End: 2021-03-30

## 2021-03-18 RX ORDER — HEPARIN SODIUM (PORCINE) LOCK FLUSH IV SOLN 100 UNIT/ML 100 UNIT/ML
5 SOLUTION INTRAVENOUS
Status: DISCONTINUED | OUTPATIENT
Start: 2021-03-18 | End: 2021-03-18 | Stop reason: HOSPADM

## 2021-03-18 RX ORDER — HEPARIN SODIUM,PORCINE 10 UNIT/ML
5 VIAL (ML) INTRAVENOUS
Status: DISCONTINUED | OUTPATIENT
Start: 2021-03-18 | End: 2021-03-18 | Stop reason: HOSPADM

## 2021-03-18 RX ORDER — HEPARIN SODIUM (PORCINE) LOCK FLUSH IV SOLN 100 UNIT/ML 100 UNIT/ML
5 SOLUTION INTRAVENOUS
Status: CANCELLED | OUTPATIENT
Start: 2021-03-18

## 2021-03-18 RX ORDER — MORPHINE SULFATE 2 MG/ML
2 INJECTION, SOLUTION INTRAMUSCULAR; INTRAVENOUS
Status: CANCELLED
Start: 2021-03-18

## 2021-03-18 RX ORDER — HEPARIN SODIUM,PORCINE 10 UNIT/ML
5 VIAL (ML) INTRAVENOUS
Status: CANCELLED | OUTPATIENT
Start: 2021-03-18

## 2021-03-18 RX ORDER — ONDANSETRON 8 MG/1
8 TABLET, FILM COATED ORAL
Status: CANCELLED
Start: 2021-03-18

## 2021-03-18 RX ORDER — OXYCODONE HYDROCHLORIDE 15 MG/1
15 TABLET ORAL EVERY 6 HOURS PRN
Qty: 60 TABLET | Refills: 0 | Status: SHIPPED | OUTPATIENT
Start: 2021-03-18 | End: 2021-03-30

## 2021-03-18 RX ORDER — MORPHINE SULFATE 2 MG/ML
2 INJECTION, SOLUTION INTRAMUSCULAR; INTRAVENOUS
Status: DISCONTINUED | OUTPATIENT
Start: 2021-03-18 | End: 2021-03-18 | Stop reason: HOSPADM

## 2021-03-18 RX ADMIN — Medication 5 ML: at 00:15

## 2021-03-18 RX ADMIN — MORPHINE SULFATE 2 MG: 2 INJECTION, SOLUTION INTRAMUSCULAR; INTRAVENOUS at 17:32

## 2021-03-18 RX ADMIN — MORPHINE SULFATE 2 MG: 2 INJECTION, SOLUTION INTRAMUSCULAR; INTRAVENOUS at 16:25

## 2021-03-18 RX ADMIN — DEXTROSE AND SODIUM CHLORIDE 500 ML: 5; 450 INJECTION, SOLUTION INTRAVENOUS at 15:26

## 2021-03-18 RX ADMIN — Medication 5 ML: at 17:51

## 2021-03-18 RX ADMIN — MORPHINE SULFATE 2 MG: 2 INJECTION, SOLUTION INTRAMUSCULAR; INTRAVENOUS at 15:27

## 2021-03-18 ASSESSMENT — PAIN SCALES - GENERAL: PAINLEVEL: WORST PAIN (10)

## 2021-03-18 NOTE — DISCHARGE INSTRUCTIONS
Instructions from your doctor today:  Please make an appointment to follow up with:  - Hematology Oncology Clinic (phone: [phone]) in the morning by phone.   - If you do not have a primary care provider, you can be seen in follow-up and establish care by calling any of the clinics below:     - Primary Care Center (phone: [phone])     - Primary Care / [facility] (phone: [phone])   - Have your provider review the results from today's visit with you again, including any potential follow-up or additional testing that may be needed based on the results. Occasionally, incidental findings are found on later review by radiologists that may need follow-up.     Return to the Emergency Department immediately if you have worsening symptoms, or any other urgent or potentially life-threatening concerns.

## 2021-03-18 NOTE — PROGRESS NOTES
Infusion Nursing Note:  Jennifer Cervantes presents for add on IVF/pain medications    Note: pt was just discharged from the hospital and states she had no pain when she left the hospital, however it came back suddenly and she's been having severe low back and leg pain, was in ER last night. Denies any chest pain or infectious symptoms.     Pain: pain at a 10 when she arrived, down to a 9 after first dose of morphine, down to a 8 after second dose of morphine and down to 7/10 after her third dose. Pt feeling well enough to discharge home after last dose    Treatment Conditions:  Not Applicable.    Intravenous Access:  Implanted Port.    Post Infusion Assessment:  Patient tolerated infusion without incident.  Blood return noted pre and post infusion.  No evidence of extravasations.  Access discontinued per protocol.    Discharge Plan:   Patient picked up refills for oxycodone and oxycontin  Discharge instructions reviewed with: Patient.  Patient and/or family verbalized understanding of discharge instructions and all questions answered.  Copy of AVS reviewed with patient and/or family.  Patient will return 3/26 for next appointment.  Patient discharged in stable condition accompanied by: self.    Afshan Diop, RN, RN

## 2021-03-18 NOTE — ED PROVIDER NOTES
History     Chief Complaint   Patient presents with     Sickle Cell Pain Crisis     HPI  Jennifer Cervantes is a 21 year old female with PMH notable for sickle cell anemia, PE now on rivaroxaban who presents to the ED with low back and bilateral thigh pain. Patient reports she had about 24 hours without pain after hospital discharge 2 days ago. Then she started having bilateral thigh pain and low back pain. No swelling. Patient has been taking her oxycodone without sufficient pain control. Pain feels similar to past sickle flares. No urinary symptoms. No vomiting.     Past Medical History  Past Medical History:   Diagnosis Date     Anemia      Anxiety      Bleeding disorder (H)      Blood clotting disorder (H)      Cerebral infarction (H) 2015     Cognitive developmental delay     low IQ. 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 (H)     right hand contractures     Iron overload due to repeated red blood cell transfusions      Migraines      Multiple subsegmental pulmonary emboli without acute cor pulmonale (H) 02/01/2021     Oppositional defiant behavior      Uncomplicated asthma      Past Surgical History:   Procedure Laterality Date     AS INSERT TUNNELED CV 2 CATH W/O PORT/PUMP       C BREAST REDUCTION (INCLUDES LIPO) TIER 3 Bilateral 04/23/2019     CHOLECYSTECTOMY       IR CVC NON TUNNEL PLACEMENT  04/07/2020     REPAIR TENDON ELBOW Right 10/02/2019    Procedure: Right Elbow Flexor Lengthening, Flexor Pronator Slide Of Wrist and Finger, Thumb Adductor Lengthening;  Surgeon: Anai Franco MD;  Location: [facility] OR     TONSILLECTOMY Bilateral 10/02/2019    Procedure: Bilateral Tonsillectomy;  Surgeon: Farhana Guy MD;  Location: [facility] OR     acetaminophen (TYLENOL) 325 MG tablet  albuterol (PROAIR HFA/PROVENTIL HFA/VENTOLIN HFA) 108 (90 Base) MCG/ACT inhaler  albuterol  "(PROVENTIL) (2.5 MG/3ML) 0.083% neb solution  aspirin (ASPIRIN) 81 MG EC tablet  budesonide-formoterol (SYMBICORT) 160-4.5 MCG/ACT Inhaler  celecoxib (CELEBREX) 100 MG capsule  diphenhydrAMINE (BENADRYL) 25 MG capsule  EPINEPHrine (ANY BX GENERIC EQUIV) 0.3 MG/0.3ML injection 2-pack  Hydroxyurea 1000 MG TABS  JADENU 360 MG tablet  medroxyPROGESTERone (DEPO-PROVERA) 150 MG/ML IM injection  naloxone (NARCAN) 4 MG/0.1ML nasal spray  ondansetron (ZOFRAN) 8 MG tablet  oxyCODONE (OXYCONTIN) 10 MG 12 hr tablet  oxyCODONE IR (ROXICODONE) 15 MG tablet  rivaroxaban ANTICOAGULANT (XARELTO) 20 MG TABS tablet  sertraline (ZOLOFT) 100 MG tablet      Allergies   Allergen Reactions     Fish-Derived Products Hives     Seafood Hives     Contrast Dye      Diagnostic X-Ray Materials      Gadolinium      Family History  Family History   Problem Relation Age of Onset     Sickle Cell Trait Mother      Hypertension Mother      Asthma Mother      Sickle Cell Trait Father      Social History   Social History     Tobacco Use     Smoking status: Never Smoker     Smokeless tobacco: Never Used   Substance Use Topics     Alcohol use: Not Currently     Alcohol/week: 0.0 standard drinks     Drug use: Never      Past medical history, past surgical history, medications, allergies, family history, and social history were reviewed with the patient. No additional pertinent items.      Review of Systems  A complete review of systems was performed with pertinent positives and negatives noted in the HPI, and all other systems negative.    Physical Exam   BP: 117/85  Pulse: 103  Temp: 99  F (37.2  C)  Resp: 16  Height: 162.6 cm (5' 4\")  Weight: 74.8 kg (165 lb)  SpO2: 91 %    Physical Exam  General: uncomfortable appearing. Appears stated age.   HENT: MMM, no oropharyngeal lesions  Eyes: PERRL, normal sclerae  Cardio: regular rate. Regular rhythm. Extremities well perfused  Resp: Normal work of breathing, normal respiratory rate.  Chest/Back: no visual " signs of trauma, no CVA tenderness. There is bilateral lumbar paraspinal tenderness  Abdomen: no tenderness, non-distended, no rebound, no guarding  Neuro: alert and fully oriented. CN II-XII grossly intact. Grossly normal strength and sensation in all extremities.   MSK: no deformities. Grossly normal ROM in extremities. Bilateral thigh tenderness.  Integumentary/Skin: no rash visualized, normal color  Psych: normal affect, normal behavior    ED Course      Procedures           Labs Ordered and Resulted from Time of ED Arrival Up to the Time of Departure from the ED   CBC WITH PLATELETS DIFFERENTIAL - Abnormal; Notable for the following components:       Result Value    RBC Count 2.07 (*)     Hemoglobin 7.3 (*)     Hematocrit 20.8 (*)      (*)     MCH 35.3 (*)     Nucleated RBCs 6 (*)     All other components within normal limits   INR - Abnormal; Notable for the following components:    INR 1.38 (*)     All other components within normal limits   COMPREHENSIVE METABOLIC PANEL - Abnormal; Notable for the following components:    Potassium 3.3 (*)     Carbon Dioxide 19 (*)     Bilirubin Total 3.3 (*)     Protein Total 9.1 (*)     ALT 69 (*)     AST 85 (*)     All other components within normal limits   RETICULOCYTE COUNT - Abnormal; Notable for the following components:    % Retic 12.4 (*)     Absolute Retic 260.8 (*)     All other components within normal limits   HCG QUALITATIVE     US Lower Extremity Venous Duplex Bilateral   Preliminary Result   IMPRESSION:   No evidence of deep venous thrombosis in either lower extremity.             Assessments & Plan (with Medical Decision Making)   Patient presenting with low back and bilateral thigh pain in the context of sickle cell disease. Vitals in the ED with mild initial tachycardia. Nursing notes reviewed.     US without evidence of DVT. No leukocytosis nor fever to suggest significant infection. Pregnancy negative. Hgb 7.3, up from 6.7 the prior morning.  Retic appropriately elevated.     In the ED, the patient's symptoms were managed with NS bolus, ketorolac, morphine, with improvement in symptoms upon reassessment.     The complete clinical picture is most consistent with sickle cell pain crisis. After counseling on the diagnosis, work-up, and treatment plan, the patient was discharged to home. The patient was advised to follow-up with her hematologist in the morning by phone, with the infusion center if pain recurs tomorrow. The patient was advised to return to the ED if worsening symptoms, or if there are any urgent/life-threatening concerns.     Final diagnoses:   Sickle cell pain crisis (H)   Bilateral thigh pain   Acute bilateral low back pain without sciatica     Discharge Medication List as of 3/18/2021 12:01 AM          --  Huang Domingo MD   Emergency Medicine   [facility] EMERGENCY DEPARTMENT  3/17/2021     Huang Domingo MD  03/18/21 0739

## 2021-03-18 NOTE — TELEPHONE ENCOUNTER
Caron Martinez CPhT  [facility]  Oncology Pharmacy Liaison  [email or web address]  Phone: [phone]  Fax: [phone]

## 2021-03-18 NOTE — TELEPHONE ENCOUNTER
Writer received call from Jennifer who states that she is struggling with her continued pain. She tried to get in yesterday but there was no availability in infusion for IVF/pain meds, set alarm for this morning to call but overslept. Requesting refill of oxycodone and infusion appointment. Was able to accommodate a 3:00 pm infusion appointment and Abdullahi called for cab ride for her as insurance will not be able to cover this short of notice. Discussed refill needs with Dr Duncan who is ok with refill of her short acting oxycodone, will also cancel prescription for long acting oxycontin that went to discharge pharmacy and instead have it sent to [address] [place] [address] pharmacy so she can  both while at appointment.

## 2021-03-18 NOTE — PATIENT INSTRUCTIONS
Contact numbers:    Triage/[phone]    Call with chills and/or temperature greater than or equal to 100.5 and questions or concerns.    If after hours, weekends, or holidays, call main hospital  at  [phone] and ask for Oncology doctor on call.                 1    Admission   8:11 PM   Raul Diaz DO   [facility] Emergency Department   (Discharge: 3/2/2021) 2     3     4  Happy Birthday!    Admission   2:19 PM   Alhaji Winters MD   [facility] [address] [place]   (Discharge: 3/16/2021) 5     6    XR CHEST PORT 1 VIEW   8:40 AM   (20 min.)   [id number]   [facility]   7     8    XR CHEST 2 VIEWS   8:05 AM   (15 min.)   [id number]   [facility] 9     10    XR CHEST 2 VIEWS  11:40 AM   (15 min.)   [email or web address]   [facility] 11     12     13       14     15     16     17    Admission   8:49 PM   [facility]   (Discharge: 3/18/2021)    US LWR EXT VENOUS DUPLEX BILAT  10:50 PM   (40 min.)   [id number]   [facility] 18    [facility] ONC INFUSION 120   3:00 PM   (120 min.)   [facility] ONCOLOGY INFUSION   [facility] 19     20       21     22     23     24     25    MR MYOCARDIUM WO   9:30 AM   (105 min.)   [email or web address]   [facility]    MR ABDOMEN WO  11:00 AM   (45 min.)   [email or web address]   [facility] 26    LAB CENTRAL  12:30 PM   (15 min.)   [facility] DRAW   [facility]    [facility] RETURN  12:45 PM   (30 min.)   Eric Duncan MD   [facility]        28     29     30     31                                                       1     2     3       4     5     6    [facility] RETURN  10:15 AM   (30 min.)   Suraj Case MD     [facility] [place] 7     8     9     10       11     12     13     14     15     16     17       18     19     20     21     22     23     24       25     26     27     28     29     30                          Lab Results:  No results found for this or any previous visit (from the past 12 hour(s)).

## 2021-03-19 ENCOUNTER — TELEPHONE (OUTPATIENT)
Dept: ONCOLOGY | Facility: CLINIC | Age: 21
End: 2021-03-19

## 2021-03-19 ENCOUNTER — HOSPITAL ENCOUNTER (EMERGENCY)
Facility: CLINIC | Age: 21
Discharge: HOME | End: 2021-03-19
Attending: EMERGENCY MEDICINE | Admitting: EMERGENCY MEDICINE
Payer: COMMERCIAL

## 2021-03-19 ENCOUNTER — HOME INFUSION (PRE-WILLOW HOME INFUSION) (OUTPATIENT)
Dept: PHARMACY | Facility: CLINIC | Age: 21
End: 2021-03-19

## 2021-03-19 VITALS
HEART RATE: 74 BPM | DIASTOLIC BLOOD PRESSURE: 81 MMHG | RESPIRATION RATE: 16 BRPM | OXYGEN SATURATION: 99 % | TEMPERATURE: 98.1 F | SYSTOLIC BLOOD PRESSURE: 123 MMHG

## 2021-03-19 DIAGNOSIS — D57.00 SICKLE CELL PAIN CRISIS (H): ICD-10-CM

## 2021-03-19 LAB
ALBUMIN SERPL-MCNC: 3.9 G/DL (ref 3.4–5)
ALP SERPL-CCNC: 62 U/L (ref 40–150)
ALT SERPL W P-5'-P-CCNC: 66 U/L (ref 0–50)
ANION GAP SERPL CALCULATED.3IONS-SCNC: 8 MMOL/L (ref 3–14)
ANISOCYTOSIS BLD QL SMEAR: ABNORMAL
AST SERPL W P-5'-P-CCNC: 68 U/L (ref 0–45)
BASOPHILS # BLD AUTO: 0.1 10E9/L (ref 0–0.2)
BASOPHILS NFR BLD AUTO: 2.7 %
BILIRUB SERPL-MCNC: 2.4 MG/DL (ref 0.2–1.3)
BUN SERPL-MCNC: 4 MG/DL (ref 7–30)
CALCIUM SERPL-MCNC: 8.6 MG/DL (ref 8.5–10.1)
CHLORIDE SERPL-SCNC: 108 MMOL/L (ref 94–109)
CO2 SERPL-SCNC: 23 MMOL/L (ref 20–32)
CREAT SERPL-MCNC: 0.52 MG/DL (ref 0.52–1.04)
DIFFERENTIAL METHOD BLD: ABNORMAL
EOSINOPHIL # BLD AUTO: 0 10E9/L (ref 0–0.7)
EOSINOPHIL NFR BLD AUTO: 0 %
ERYTHROCYTE [DISTWIDTH] IN BLOOD BY AUTOMATED COUNT: ABNORMAL % (ref 10–15)
GFR SERPL CREATININE-BSD FRML MDRD: >90 ML/MIN/{1.73_M2}
GLUCOSE SERPL-MCNC: 85 MG/DL (ref 70–99)
HCG SERPL QL: NEGATIVE
HCT VFR BLD AUTO: 20.4 % (ref 35–47)
HGB BLD-MCNC: 7.1 G/DL (ref 11.7–15.7)
LYMPHOCYTES # BLD AUTO: 0.9 10E9/L (ref 0.8–5.3)
LYMPHOCYTES NFR BLD AUTO: 16.8 %
MACROCYTES BLD QL SMEAR: PRESENT
MCH RBC QN AUTO: 35.7 PG (ref 26.5–33)
MCHC RBC AUTO-ENTMCNC: 34.8 G/DL (ref 31.5–36.5)
MCV RBC AUTO: 103 FL (ref 78–100)
MONOCYTES # BLD AUTO: 0 10E9/L (ref 0–1.3)
MONOCYTES NFR BLD AUTO: 0.9 %
NEUTROPHILS # BLD AUTO: 4.2 10E9/L (ref 1.6–8.3)
NEUTROPHILS NFR BLD AUTO: 79.6 %
NRBC # BLD AUTO: 0.3 10*3/UL
NRBC BLD AUTO-RTO: 6 /100
PLATELET # BLD AUTO: 350 10E9/L (ref 150–450)
PLATELET # BLD EST: ABNORMAL 10*3/UL
POIKILOCYTOSIS BLD QL SMEAR: ABNORMAL
POLYCHROMASIA BLD QL SMEAR: ABNORMAL
POTASSIUM SERPL-SCNC: 3.1 MMOL/L (ref 3.4–5.3)
PROT SERPL-MCNC: 8.1 G/DL (ref 6.8–8.8)
RBC # BLD AUTO: 1.99 10E12/L (ref 3.8–5.2)
RETICS # AUTO: 326.2 10E9/L (ref 25–95)
RETICS/RBC NFR AUTO: 16.4 % (ref 0.5–2)
SICKLE CELLS BLD QL SMEAR: ABNORMAL
SODIUM SERPL-SCNC: 139 MMOL/L (ref 133–144)
TARGETS BLD QL SMEAR: ABNORMAL
WBC # BLD AUTO: 5.3 10E9/L (ref 4–11)

## 2021-03-19 PROCEDURE — 85045 AUTOMATED RETICULOCYTE COUNT: CPT | Performed by: EMERGENCY MEDICINE

## 2021-03-19 PROCEDURE — 250N000013 HC RX MED GY IP 250 OP 250 PS 637: Performed by: EMERGENCY MEDICINE

## 2021-03-19 PROCEDURE — 99285 EMERGENCY DEPT VISIT HI MDM: CPT | Mod: 25 | Performed by: EMERGENCY MEDICINE

## 2021-03-19 PROCEDURE — 96374 THER/PROPH/DIAG INJ IV PUSH: CPT | Performed by: EMERGENCY MEDICINE

## 2021-03-19 PROCEDURE — 96361 HYDRATE IV INFUSION ADD-ON: CPT | Performed by: EMERGENCY MEDICINE

## 2021-03-19 PROCEDURE — 84703 CHORIONIC GONADOTROPIN ASSAY: CPT | Performed by: EMERGENCY MEDICINE

## 2021-03-19 PROCEDURE — 96376 TX/PRO/DX INJ SAME DRUG ADON: CPT | Performed by: EMERGENCY MEDICINE

## 2021-03-19 PROCEDURE — 85025 COMPLETE CBC W/AUTO DIFF WBC: CPT | Performed by: EMERGENCY MEDICINE

## 2021-03-19 PROCEDURE — 99285 EMERGENCY DEPT VISIT HI MDM: CPT | Performed by: EMERGENCY MEDICINE

## 2021-03-19 PROCEDURE — 96375 TX/PRO/DX INJ NEW DRUG ADDON: CPT | Performed by: EMERGENCY MEDICINE

## 2021-03-19 PROCEDURE — 250N000011 HC RX IP 250 OP 636: Performed by: EMERGENCY MEDICINE

## 2021-03-19 PROCEDURE — 258N000003 HC RX IP 258 OP 636: Performed by: EMERGENCY MEDICINE

## 2021-03-19 PROCEDURE — 80053 COMPREHEN METABOLIC PANEL: CPT | Performed by: EMERGENCY MEDICINE

## 2021-03-19 RX ORDER — ONDANSETRON 2 MG/ML
8 INJECTION INTRAMUSCULAR; INTRAVENOUS ONCE
Status: COMPLETED | OUTPATIENT
Start: 2021-03-19 | End: 2021-03-19

## 2021-03-19 RX ORDER — MORPHINE SULFATE 2 MG/ML
2 INJECTION, SOLUTION INTRAMUSCULAR; INTRAVENOUS ONCE
Status: COMPLETED | OUTPATIENT
Start: 2021-03-19 | End: 2021-03-19

## 2021-03-19 RX ORDER — DIPHENHYDRAMINE HCL 50 MG
50 CAPSULE ORAL ONCE
Status: COMPLETED | OUTPATIENT
Start: 2021-03-19 | End: 2021-03-19

## 2021-03-19 RX ORDER — HEPARIN SODIUM (PORCINE) LOCK FLUSH IV SOLN 100 UNIT/ML 100 UNIT/ML
5 SOLUTION INTRAVENOUS ONCE
Status: COMPLETED | OUTPATIENT
Start: 2021-03-19 | End: 2021-03-19

## 2021-03-19 RX ADMIN — MORPHINE SULFATE 2 MG: 2 INJECTION, SOLUTION INTRAMUSCULAR; INTRAVENOUS at 14:18

## 2021-03-19 RX ADMIN — MORPHINE SULFATE 2 MG: 2 INJECTION, SOLUTION INTRAMUSCULAR; INTRAVENOUS at 13:45

## 2021-03-19 RX ADMIN — MORPHINE SULFATE 2 MG: 2 INJECTION, SOLUTION INTRAMUSCULAR; INTRAVENOUS at 15:09

## 2021-03-19 RX ADMIN — MORPHINE SULFATE 2 MG: 2 INJECTION, SOLUTION INTRAMUSCULAR; INTRAVENOUS at 16:25

## 2021-03-19 RX ADMIN — Medication 5 ML: at 16:56

## 2021-03-19 RX ADMIN — SODIUM CHLORIDE, POTASSIUM CHLORIDE, SODIUM LACTATE AND CALCIUM CHLORIDE 1000 ML: 600; 310; 30; 20 INJECTION, SOLUTION INTRAVENOUS at 12:23

## 2021-03-19 RX ADMIN — ONDANSETRON 8 MG: 2 INJECTION INTRAMUSCULAR; INTRAVENOUS at 13:44

## 2021-03-19 RX ADMIN — DIPHENHYDRAMINE HYDROCHLORIDE 50 MG: 50 CAPSULE ORAL at 13:47

## 2021-03-19 ASSESSMENT — ENCOUNTER SYMPTOMS
CHILLS: 0
FEVER: 0
EYE DISCHARGE: 0
DYSURIA: 0
AGITATION: 0
HEADACHES: 0
VOMITING: 0
SHORTNESS OF BREATH: 0
WEAKNESS: 0
FREQUENCY: 0
NAUSEA: 0
SORE THROAT: 0
COLOR CHANGE: 0
DIARRHEA: 0
LIGHT-HEADEDNESS: 0
CONFUSION: 0
ADENOPATHY: 0
BACK PAIN: 1
MYALGIAS: 1
ABDOMINAL PAIN: 0
COUGH: 0

## 2021-03-19 NOTE — DISCHARGE INSTRUCTIONS
Fluids  Continue your pain regimen  Follow-up with hematology as needed  Return if increasing pain not improved with pain pills  Return if fever.

## 2021-03-19 NOTE — ED TRIAGE NOTES
Pain to bilateral arms and thighs as well as back. Started yesterday. Had infusion yesterday and attempted to get another infusion today, but her mom was unable to drive her so she came to the ER instead.

## 2021-03-19 NOTE — TELEPHONE ENCOUNTER
Writer received call from Jennifer who advised that she was brought to ER by her mom as her pain was unmanageable at home and was unable to get in for IVF/pain meds in [facility]. Advised her that if needed, it's ok to be admitted for continued pain management as we are headed into the weekend and am concerned about her level of pain over weekend when clinic is closed. She voiced understanding and will update if admitted.

## 2021-03-19 NOTE — TELEPHONE ENCOUNTER
[facility] Telephone Triage Note    The following symptoms were reported:   Typical  Description:            Onset:  When came home from infusion yesterday, just got a little relief.  Location: Legs and lower back  Character: Sharp           Intensity: 10/10    Accompanying Signs & Symptoms:  none    Chest Pain:  denies   Shortness of Breath:  denies     Fever:  denies     Chills:  denies   Cough/sore throat:  denies  Other:  denies    Therapies Tried and outcome: around 7:30am took oxycodone 15mg in addition to tylenol and IBUprofen since, warm shower/bath    Improved by: nothing    Takes 15-20 minutes, needs transportation. Pt call back number is [phone].    The following provider was consulted:  9:49am Per Andrei HIGGINS,    Approved IVF/Pain today, No labs      The following advice/orders were given, and/or interventions recommended:  Added to infusion wait list, is aware to go to ED if unable to wait for opening and continues to have uncontrolled pain.    Patient instructions and/or follow up:  Went to ER

## 2021-03-19 NOTE — ED PROVIDER NOTES
[place] EMERGENCY DEPARTMENT ([facility])  3/19/21  History     Chief Complaint   Patient presents with     Sickle Cell Pain Crisis     The history is provided by the patient and medical records.     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    Per review of the patient's medical record, she was seen at the [place] ED on 3/17/2021 for low back, bilateral thigh pain, most consistent with the patient's previous sickle cell pain crises. Patient was subsequently worked up with a venous ultrasound of the bilateral lower extremities which showed no evidence of DVT. Patient's labs were notable for hemoglobin at 7.3 (up from previous at 6.7). Patient was subsequently treated with bolus of NS, ketorolac, and morphine with improvement in symptoms.  Patient was subsequently discharged with plan to follow-up with her primary hematologist.    Patient presents with continued pain in her low back as well as her arms and legs which is typical for her sickle cell exacerbations.  She has no fever.  She denies chest pain or shortness of breath.  She was unable to get back to the infusion center.  She was instructed to increase her dosing of oxycodone at home from every 6 hours to every 4 hours.      I have reviewed the Medications, Allergies, Past Medical and Surgical History, and Social History in the [facility] system.  PAST MEDICAL HISTORY:   Past Medical History:   Diagnosis Date     Anemia      Anxiety      Bleeding disorder (H)      Blood clotting disorder (H)      Cerebral infarction (H) 2015     Cognitive developmental delay     low IQ. Please recognize when managing pain and planning with her     Depressive disorder      Hemiplegia and hemiparesis following cerebral infarction  affecting right dominant side (H)     right hand contractures     Iron overload due to repeated red blood cell transfusions      Migraines      Multiple subsegmental pulmonary emboli without acute cor pulmonale (H) 02/01/2021     Oppositional defiant behavior      Uncomplicated asthma        PAST SURGICAL HISTORY:   Past Surgical History:   Procedure Laterality Date     AS INSERT TUNNELED CV 2 CATH W/O PORT/PUMP       C BREAST REDUCTION (INCLUDES LIPO) TIER 3 Bilateral 04/23/2019     CHOLECYSTECTOMY       IR CVC NON TUNNEL PLACEMENT  04/07/2020     REPAIR TENDON ELBOW Right 10/02/2019    Procedure: Right Elbow Flexor Lengthening, Flexor Pronator Slide Of Wrist and Finger, Thumb Adductor Lengthening;  Surgeon: Anai Franco MD;  Location: [facility] OR     TONSILLECTOMY Bilateral 10/02/2019    Procedure: Bilateral Tonsillectomy;  Surgeon: Farhana Guy MD;  Location: [facility] OR       Past medical history, past surgical history, medications, and allergies were reviewed with the patient. Additional pertinent items: None    FAMILY HISTORY:   Family History   Problem Relation Age of Onset     Sickle Cell Trait Mother      Hypertension Mother      Asthma Mother      Sickle Cell Trait Father        SOCIAL HISTORY:   Social History     Tobacco Use     Smoking status: Never Smoker     Smokeless tobacco: Never Used   Substance Use Topics     Alcohol use: Not Currently     Alcohol/week: 0.0 standard drinks     Social history was reviewed with the patient. Additional pertinent items: None      Discharge Medication List as of 3/19/2021  4:39 PM      CONTINUE these medications which have NOT CHANGED    Details   acetaminophen (TYLENOL) 325 MG tablet Take 2 tablets (650 mg) by mouth every 6 hours as needed for mild pain, Disp-120 tablet, R-3, E-Prescribe      albuterol (PROAIR HFA/PROVENTIL HFA/VENTOLIN HFA) 108 (90 Base) MCG/ACT inhaler Inhale 2 puffs into the lungs every 6 hours as needed for shortness of breath /  dyspnea or wheezing, Disp-8.5 g, R-3, E-PrescribePharmacy may dispense brand covered by insurance (Proair, or proventil or ventolin or generic albuterol inhaler)      albuterol (PROVENTIL) (2.5 MG/3ML) 0.083% neb solution Take 1 vial (2.5 mg) by nebulization every 6 hours as needed for shortness of breath / dyspnea or wheezing, Disp-12 mL, R-4, E-Prescribe      aspirin (ASPIRIN) 81 MG EC tablet Take 1 tablet (81 mg) by mouth daily . HOLD while on Xarelto, Disp-90 tablet, R-3, Historical      budesonide-formoterol (SYMBICORT) 160-4.5 MCG/ACT Inhaler Inhale 2 puffs into the lungs 2 times daily, Disp-10.2 g, R-3, E-Prescribe      celecoxib (CELEBREX) 100 MG capsule Take 1 capsule (100 mg) by mouth 2 times daily, Disp-60 capsule, R-3, E-Prescribe      diphenhydrAMINE (BENADRYL) 25 MG capsule Take 1-2 capsules (25-50 mg) by mouth nightly as needed for sleep, Disp-60 capsule, R-3, E-Prescribe      EPINEPHrine (ANY BX GENERIC EQUIV) 0.3 MG/0.3ML injection 2-pack Inject 0.3 mLs (0.3 mg) into the muscle as needed for anaphylaxis, Disp-1 each, R-1, E-Prescribe      Hydroxyurea 1000 MG TABS Take 2,000 mg by mouth daily, Disp-60 tablet, R-3, E-Prescribe      JADENU 360 MG tablet Take 4 tablets (1,440 mg) by mouth every evening, Disp-30 tablet, R-0, SHIELA, E-Prescribe      medroxyPROGESTERone (DEPO-PROVERA) 150 MG/ML IM injection Inject 150 mg into the muscle, Historical      naloxone (NARCAN) 4 MG/0.1ML nasal spray Spray 1 spray (4 mg) into one nostril alternating nostrils once as needed for opioid reversal every 2-3 minutes until assistance arrives, Disp-0.2 mL,R-1, E-Prescribe      ondansetron (ZOFRAN) 8 MG tablet Historical      oxyCODONE (OXYCONTIN) 10 MG 12 hr tablet Take 1 tablet (10 mg) by mouth every morning, Disp-30 tablet, R-0, E-Prescribe      oxyCODONE IR (ROXICODONE) 15 MG tablet Take 1 tablet (15 mg) by mouth every 6 hours as needed for severe pain, Disp-60 tablet, R-0, E-Prescribe      rivaroxaban ANTICOAGULANT  (XARELTO) 20 MG TABS tablet Take 1 tablet (20 mg) by mouth daily (with dinner), Disp-63 tablet, R-0, E-Prescribe      sertraline (ZOLOFT) 100 MG tablet Take 2 tablets (200 mg) by mouth daily, Disp-180 tablet, R-3, E-Prescribe                Allergies   Allergen Reactions     Fish-Derived Products Hives     Seafood Hives     Contrast Dye      Diagnostic X-Ray Materials      Gadolinium         Review of Systems   Constitutional: Negative for chills and fever.   HENT: Negative for congestion and sore throat.    Eyes: Negative for discharge.   Respiratory: Negative for cough and shortness of breath.    Cardiovascular: Negative for chest pain and leg swelling.   Gastrointestinal: Negative for abdominal pain, diarrhea, nausea and vomiting.   Genitourinary: Negative for dysuria and frequency.   Musculoskeletal: Positive for back pain and myalgias.   Skin: Negative for color change and rash.   Neurological: Negative for weakness, light-headedness and headaches.   Hematological: Negative for adenopathy.   Psychiatric/Behavioral: Negative for agitation, behavioral problems and confusion.       Physical Exam   BP: 138/61  Pulse: 97  Temp: 98.1  F (36.7  C)  Resp: 16  SpO2: 97 %      Physical Exam  Vitals signs and nursing note reviewed.   Constitutional:       General: She is not in acute distress.     Appearance: She is well-developed.   HENT:      Head: Normocephalic and atraumatic.   Eyes:      Conjunctiva/sclera: Conjunctivae normal.      Pupils: Pupils are equal, round, and reactive to light.   Neck:      Musculoskeletal: Normal range of motion and neck supple.      Thyroid: No thyromegaly.      Trachea: No tracheal deviation.   Cardiovascular:      Rate and Rhythm: Normal rate and regular rhythm.      Heart sounds: Normal heart sounds. No murmur.   Pulmonary:      Effort: Pulmonary effort is normal. No respiratory distress.      Breath sounds: Normal breath sounds. No wheezing.   Chest:      Chest wall: No tenderness.    Abdominal:      General: There is no distension.      Palpations: Abdomen is soft.      Tenderness: There is no abdominal tenderness.   Musculoskeletal:         General: No tenderness.   Skin:     General: Skin is warm.      Findings: No rash.   Neurological:      Mental Status: She is alert and oriented to person, place, and time.      Sensory: No sensory deficit.   Psychiatric:         Behavior: Behavior normal.         ED Course        Procedures               Results for orders placed or performed during the hospital encounter of 03/19/21 (from the past 24 hour(s))   CBC with platelets differential   Result Value Ref Range    WBC 5.3 4.0 - 11.0 10e9/L    RBC Count 1.99 (L) 3.8 - 5.2 10e12/L    Hemoglobin 7.1 (L) 11.7 - 15.7 g/dL    Hematocrit 20.4 (L) 35.0 - 47.0 %     (H) 78 - 100 fl    MCH 35.7 (H) 26.5 - 33.0 pg    MCHC 34.8 31.5 - 36.5 g/dL    RDW Dimorphic population - unable to calculate 10.0 - 15.0 %    Platelet Count 350 150 - 450 10e9/L    Diff Method Manual Differential     % Neutrophils 79.6 %    % Lymphocytes 16.8 %    % Monocytes 0.9 %    % Eosinophils 0.0 %    % Basophils 2.7 %    Nucleated RBCs 6 (H) 0 /100    Absolute Neutrophil 4.2 1.6 - 8.3 10e9/L    Absolute Lymphocytes 0.9 0.8 - 5.3 10e9/L    Absolute Monocytes 0.0 0.0 - 1.3 10e9/L    Absolute Eosinophils 0.0 0.0 - 0.7 10e9/L    Absolute Basophils 0.1 0.0 - 0.2 10e9/L    Absolute Nucleated RBC 0.3     Anisocytosis Moderate     Poikilocytosis Marked     Polychromasia Marked     Sickle Cells Marked     Target Cells Marked     Macrocytes Present     Platelet Estimate Confirming automated cell count    Comprehensive metabolic panel   Result Value Ref Range    Sodium 139 133 - 144 mmol/L    Potassium 3.1 (L) 3.4 - 5.3 mmol/L    Chloride 108 94 - 109 mmol/L    Carbon Dioxide 23 20 - 32 mmol/L    Anion Gap 8 3 - 14 mmol/L    Glucose 85 70 - 99 mg/dL    Urea Nitrogen 4 (L) 7 - 30 mg/dL    Creatinine 0.52 0.52 - 1.04 mg/dL    GFR Estimate >90  >60 mL/min/[1.73_m2]    GFR Estimate If Black >90 >60 mL/min/[1.73_m2]    Calcium 8.6 8.5 - 10.1 mg/dL    Bilirubin Total 2.4 (H) 0.2 - 1.3 mg/dL    Albumin 3.9 3.4 - 5.0 g/dL    Protein Total 8.1 6.8 - 8.8 g/dL    Alkaline Phosphatase 62 40 - 150 U/L    ALT 66 (H) 0 - 50 U/L    AST 68 (H) 0 - 45 U/L   HCG qualitative Blood   Result Value Ref Range    HCG Qualitative Serum Negative NEG^Negative   Reticulocyte count   Result Value Ref Range    % Retic 16.4 (H) 0.5 - 2.0 %    Absolute Retic 326.2 (H) 25 - 95 10e9/L     Medications   lactated ringers BOLUS 1,000 mL (0 mLs Intravenous Stopped 3/19/21 1347)   morphine (PF) injection 2 mg (2 mg Intravenous Given 3/19/21 1345)   ondansetron (ZOFRAN) injection 8 mg (8 mg Intravenous Given 3/19/21 1344)   diphenhydrAMINE (BENADRYL) capsule 50 mg (50 mg Oral Given 3/19/21 1347)   morphine (PF) injection 2 mg (2 mg Intravenous Given 3/19/21 1418)   morphine (PF) injection 2 mg (2 mg Intravenous Given 3/19/21 1509)   morphine (PF) injection 2 mg (2 mg Intravenous Given 3/19/21 1625)   heparin 100 UNIT/ML injection 5 mL (5 mLs Intracatheter Given 3/19/21 1656)             Assessments & Plan (with Medical Decision Making)   Patient is a 21-year-old female with a past history of sickle cell anemia, pulmonary embolus, and CVA with right-sided hemiparesis who presents for evaluation of bilateral arm and leg pain and back pain.  She reports that this feels similar to her past sickle cell crisis.  She denies fever or symptoms suggestive of infection.  She has been trying her home medications without much relief.  She denies any chest pain or shortness of breath.    On exam, patient's vital signs were normal with exception of slight hypertension with a blood pressure of 138 systolically.  She is afebrile.  She has general myalgias, but no areas of redness, swelling, or warmth.  IV was established and bloods were drawn.    Patient's white count was 5.3 with a hemoglobin of 7.1.  Her  hemoglobin from 2 days ago was 7.3.  A comprehensive metabolic panel was performed which showed a potassium of 3.1 and a slightly elevated total bilirubin, ALT, and AST.  Her pregnancy test was negative.  Her reticulocyte count was elevated.    Patient was given IV fluids, multiple doses of morphine, Zofran, and Benadryl with eventual improvement.  Her vital signs remained normal.  She felt she would be able to be discharged on her home medications and did not require admission.  She was instructed to return should she develop increasing pain, fevers, shortness of breath, or vomiting.  Patient will otherwise follow-up with her primary hematologist.    I have reviewed the nursing notes.    I have reviewed the findings, diagnosis, plan and need for follow up with the patient.    Discharge Medication List as of 3/19/2021  4:39 PM          Final diagnoses:   Sickle cell pain crisis (H)       3/19/2021   [facility] EMERGENCY DEPARTMENT     Ifeanyi Gaitan MD  03/20/21 2401

## 2021-03-20 ENCOUNTER — HOSPITAL ENCOUNTER (EMERGENCY)
Facility: CLINIC | Age: 21
Discharge: HOME | End: 2021-03-20
Attending: FAMILY MEDICINE | Admitting: FAMILY MEDICINE
Payer: COMMERCIAL

## 2021-03-20 VITALS
TEMPERATURE: 98.9 F | OXYGEN SATURATION: 97 % | SYSTOLIC BLOOD PRESSURE: 146 MMHG | RESPIRATION RATE: 18 BRPM | HEART RATE: 78 BPM | DIASTOLIC BLOOD PRESSURE: 81 MMHG

## 2021-03-20 DIAGNOSIS — J45.40 MODERATE PERSISTENT ASTHMA, UNSPECIFIED WHETHER COMPLICATED: ICD-10-CM

## 2021-03-20 DIAGNOSIS — D57.00 SICKLE CELL PAIN CRISIS (H): ICD-10-CM

## 2021-03-20 LAB
ALBUMIN SERPL-MCNC: 3.6 G/DL (ref 3.4–5)
ALBUMIN UR-MCNC: NEGATIVE MG/DL
ALP SERPL-CCNC: 59 U/L (ref 40–150)
ALT SERPL W P-5'-P-CCNC: 60 U/L (ref 0–50)
ANION GAP SERPL CALCULATED.3IONS-SCNC: 5 MMOL/L (ref 3–14)
ANISOCYTOSIS BLD QL SMEAR: ABNORMAL
APPEARANCE UR: CLEAR
APTT PPP: 32 SEC (ref 22–37)
AST SERPL W P-5'-P-CCNC: 60 U/L (ref 0–45)
BASOPHILS # BLD AUTO: 0.1 10E9/L (ref 0–0.2)
BASOPHILS NFR BLD AUTO: 1.8 %
BILIRUB SERPL-MCNC: 1.9 MG/DL (ref 0.2–1.3)
BILIRUB UR QL STRIP: NEGATIVE
BUN SERPL-MCNC: 6 MG/DL (ref 7–30)
CALCIUM SERPL-MCNC: 8.4 MG/DL (ref 8.5–10.1)
CHLORIDE SERPL-SCNC: 111 MMOL/L (ref 94–109)
CO2 SERPL-SCNC: 23 MMOL/L (ref 20–32)
COLOR UR AUTO: NORMAL
CREAT SERPL-MCNC: 0.51 MG/DL (ref 0.52–1.04)
DIFFERENTIAL METHOD BLD: ABNORMAL
ELLIPTOCYTES BLD QL SMEAR: ABNORMAL
EOSINOPHIL # BLD AUTO: 0.1 10E9/L (ref 0–0.7)
EOSINOPHIL NFR BLD AUTO: 1.8 %
ERYTHROCYTE [DISTWIDTH] IN BLOOD BY AUTOMATED COUNT: 28.2 % (ref 10–15)
GFR SERPL CREATININE-BSD FRML MDRD: >90 ML/MIN/{1.73_M2}
GLUCOSE SERPL-MCNC: 86 MG/DL (ref 70–99)
GLUCOSE UR STRIP-MCNC: NEGATIVE MG/DL
HCG UR QL: NEGATIVE
HCT VFR BLD AUTO: 19.5 % (ref 35–47)
HGB BLD-MCNC: 6.6 G/DL (ref 11.7–15.7)
HGB UR QL STRIP: NEGATIVE
HYPOCHROMIA BLD QL: PRESENT
INR PPP: 1.34 (ref 0.86–1.14)
KETONES UR STRIP-MCNC: NEGATIVE MG/DL
LEUKOCYTE ESTERASE UR QL STRIP: NEGATIVE
LYMPHOCYTES # BLD AUTO: 2.1 10E9/L (ref 0.8–5.3)
LYMPHOCYTES NFR BLD AUTO: 39.8 %
MACROCYTES BLD QL SMEAR: PRESENT
MCH RBC QN AUTO: 35.9 PG (ref 26.5–33)
MCHC RBC AUTO-ENTMCNC: 33.8 G/DL (ref 31.5–36.5)
MCV RBC AUTO: 106 FL (ref 78–100)
MICROCYTES BLD QL SMEAR: PRESENT
MONOCYTES # BLD AUTO: 0.1 10E9/L (ref 0–1.3)
MONOCYTES NFR BLD AUTO: 1.8 %
NEUTROPHILS # BLD AUTO: 2.9 10E9/L (ref 1.6–8.3)
NEUTROPHILS NFR BLD AUTO: 54.8 %
NITRATE UR QL: NEGATIVE
NRBC # BLD AUTO: 0.6 10*3/UL
NRBC BLD AUTO-RTO: 11 /100
PH UR STRIP: 7 PH (ref 5–7)
PLATELET # BLD AUTO: 347 10E9/L (ref 150–450)
PLATELET # BLD EST: ABNORMAL 10*3/UL
POIKILOCYTOSIS BLD QL SMEAR: ABNORMAL
POLYCHROMASIA BLD QL SMEAR: ABNORMAL
POTASSIUM SERPL-SCNC: 3.5 MMOL/L (ref 3.4–5.3)
PROT SERPL-MCNC: 7.6 G/DL (ref 6.8–8.8)
RBC # BLD AUTO: 1.84 10E12/L (ref 3.8–5.2)
RBC #/AREA URNS AUTO: <1 /HPF (ref 0–2)
SICKLE CELLS BLD QL SMEAR: ABNORMAL
SODIUM SERPL-SCNC: 140 MMOL/L (ref 133–144)
SOURCE: NORMAL
SP GR UR STRIP: 1.01 (ref 1–1.03)
SQUAMOUS #/AREA URNS AUTO: 1 /HPF (ref 0–1)
TARGETS BLD QL SMEAR: ABNORMAL
UROBILINOGEN UR STRIP-MCNC: NORMAL MG/DL (ref 0–2)
WBC # BLD AUTO: 5.3 10E9/L (ref 4–11)
WBC #/AREA URNS AUTO: <1 /HPF (ref 0–5)

## 2021-03-20 PROCEDURE — 96375 TX/PRO/DX INJ NEW DRUG ADDON: CPT

## 2021-03-20 PROCEDURE — 258N000003 HC RX IP 258 OP 636: Performed by: FAMILY MEDICINE

## 2021-03-20 PROCEDURE — 99285 EMERGENCY DEPT VISIT HI MDM: CPT | Mod: 25

## 2021-03-20 PROCEDURE — 99285 EMERGENCY DEPT VISIT HI MDM: CPT | Performed by: FAMILY MEDICINE

## 2021-03-20 PROCEDURE — 250N000013 HC RX MED GY IP 250 OP 250 PS 637: Performed by: FAMILY MEDICINE

## 2021-03-20 PROCEDURE — 85730 THROMBOPLASTIN TIME PARTIAL: CPT | Performed by: FAMILY MEDICINE

## 2021-03-20 PROCEDURE — 94640 AIRWAY INHALATION TREATMENT: CPT

## 2021-03-20 PROCEDURE — 81001 URINALYSIS AUTO W/SCOPE: CPT | Performed by: FAMILY MEDICINE

## 2021-03-20 PROCEDURE — 81025 URINE PREGNANCY TEST: CPT | Performed by: FAMILY MEDICINE

## 2021-03-20 PROCEDURE — 96376 TX/PRO/DX INJ SAME DRUG ADON: CPT

## 2021-03-20 PROCEDURE — 96374 THER/PROPH/DIAG INJ IV PUSH: CPT

## 2021-03-20 PROCEDURE — 80053 COMPREHEN METABOLIC PANEL: CPT | Performed by: FAMILY MEDICINE

## 2021-03-20 PROCEDURE — 85610 PROTHROMBIN TIME: CPT | Performed by: FAMILY MEDICINE

## 2021-03-20 PROCEDURE — 85025 COMPLETE CBC W/AUTO DIFF WBC: CPT | Performed by: FAMILY MEDICINE

## 2021-03-20 PROCEDURE — 250N000011 HC RX IP 250 OP 636: Performed by: FAMILY MEDICINE

## 2021-03-20 RX ORDER — SODIUM CHLORIDE 9 MG/ML
INJECTION, SOLUTION INTRAVENOUS CONTINUOUS
Status: DISCONTINUED | OUTPATIENT
Start: 2021-03-20 | End: 2021-03-20 | Stop reason: HOSPADM

## 2021-03-20 RX ORDER — ALBUTEROL SULFATE 90 UG/1
2 AEROSOL, METERED RESPIRATORY (INHALATION) ONCE
Status: COMPLETED | OUTPATIENT
Start: 2021-03-20 | End: 2021-03-20

## 2021-03-20 RX ORDER — LIDOCAINE 40 MG/G
CREAM TOPICAL
Status: DISCONTINUED | OUTPATIENT
Start: 2021-03-20 | End: 2021-03-20 | Stop reason: HOSPADM

## 2021-03-20 RX ORDER — MORPHINE SULFATE 2 MG/ML
2 INJECTION, SOLUTION INTRAMUSCULAR; INTRAVENOUS
Status: COMPLETED | OUTPATIENT
Start: 2021-03-20 | End: 2021-03-20

## 2021-03-20 RX ORDER — ONDANSETRON 2 MG/ML
4 INJECTION INTRAMUSCULAR; INTRAVENOUS
Status: COMPLETED | OUTPATIENT
Start: 2021-03-20 | End: 2021-03-20

## 2021-03-20 RX ORDER — HEPARIN SODIUM (PORCINE) LOCK FLUSH IV SOLN 100 UNIT/ML 100 UNIT/ML
5 SOLUTION INTRAVENOUS ONCE
Status: COMPLETED | OUTPATIENT
Start: 2021-03-20 | End: 2021-03-20

## 2021-03-20 RX ORDER — DIPHENHYDRAMINE HCL 50 MG
50 CAPSULE ORAL
Status: COMPLETED | OUTPATIENT
Start: 2021-03-20 | End: 2021-03-20

## 2021-03-20 RX ADMIN — SODIUM CHLORIDE 1000 ML: 9 INJECTION, SOLUTION INTRAVENOUS at 12:42

## 2021-03-20 RX ADMIN — ONDANSETRON 4 MG: 2 INJECTION INTRAMUSCULAR; INTRAVENOUS at 12:42

## 2021-03-20 RX ADMIN — MORPHINE SULFATE 2 MG: 2 INJECTION, SOLUTION INTRAMUSCULAR; INTRAVENOUS at 12:42

## 2021-03-20 RX ADMIN — MORPHINE SULFATE 2 MG: 2 INJECTION, SOLUTION INTRAMUSCULAR; INTRAVENOUS at 13:48

## 2021-03-20 RX ADMIN — DIPHENHYDRAMINE HYDROCHLORIDE 50 MG: 50 CAPSULE ORAL at 12:42

## 2021-03-20 RX ADMIN — SODIUM CHLORIDE 1000 ML: 9 INJECTION, SOLUTION INTRAVENOUS at 14:47

## 2021-03-20 RX ADMIN — ALBUTEROL SULFATE 2 PUFF: 108 INHALANT RESPIRATORY (INHALATION) at 18:08

## 2021-03-20 RX ADMIN — MORPHINE SULFATE 2 MG: 2 INJECTION, SOLUTION INTRAMUSCULAR; INTRAVENOUS at 16:14

## 2021-03-20 RX ADMIN — MORPHINE SULFATE 2 MG: 2 INJECTION, SOLUTION INTRAMUSCULAR; INTRAVENOUS at 14:48

## 2021-03-20 RX ADMIN — Medication 5 ML: at 18:09

## 2021-03-20 ASSESSMENT — ENCOUNTER SYMPTOMS
WEAKNESS: 0
CONFUSION: 0
HEADACHES: 0
ABDOMINAL PAIN: 0
NAUSEA: 0
NECK STIFFNESS: 0
COLOR CHANGE: 0
SHORTNESS OF BREATH: 0
FEVER: 0
EYE REDNESS: 0
DIFFICULTY URINATING: 0
DYSURIA: 0
BACK PAIN: 1

## 2021-03-20 NOTE — DISCHARGE INSTRUCTIONS
Home.  Take home medications.  Use the albuterol 2 puffs 4 times a day as needed.  Follow up with MD  Also follow up with hem clinic and infusion center.  Return if any concerns at all.

## 2021-03-20 NOTE — ED TRIAGE NOTES
Pt presents ambulatory to triage with c/o generalized sickle cell pain of 10/10. Pt was seen in the ED yesterday and received meds and fluids, but did not experience good pain control at that time and continues to have pain today.   Arely Lee RN on 3/20/2021 at 11:18 AM

## 2021-03-20 NOTE — ED PROVIDER NOTES
ED Provider Note  [facility]      History     Chief Complaint   Patient presents with     Sickle Cell Pain Crisis     The history is provided by the patient and medical records.     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  She states that she has been in pain since she left the hospital on 3/17/2021.  She was told to continue to go to her infusion center upon discharge.  She describes her pain in her back, bilateral arms and legs.  She denies any chest pain, cough, shortness of breath, vision changes, weakness, nausea, abdominal pain or dysuria.  Yesterday, she states that her ED visit seemed rushed and did not feel like her pain was improved greatly.    Per review of the patient's medical record, they were seen at the [place] ED on 3/17/2021 for low back, bilateral thigh pain, most consistent with the patient's previous sickle cell pain crises. Patient was subsequently worked up with a venous ultrasound of the bilateral lower extremities which showed no evidence of DVT. Patient's labs were notable for hemoglobin at 7.3 (up from previous at 6.7). Patient was subsequently treated with bolus of NS, ketorolac, and morphine with improvement in symptoms.  Patient was subsequently discharged with plan to follow-up with her primary hematologist.    Per the patient's medical record, the patient was seen yesterday for a sickle cell pain crisis that she described as a continuation of her pain that is similar to previous sickle cell exacerbations.  She did not have any fever, chest pain or shortness of breath.  The patient was unable to get to the infusion center and was instructed to increase her dosing of oxycodone at home from every 6 hours to every 4 hours.  Patient did receive her usual  dosing and was later discharged.    Past Medical History  Past Medical History:   Diagnosis Date     Anemia      Anxiety      Bleeding disorder (H)      Blood clotting disorder (H)      Cerebral infarction (H) 2015     Cognitive developmental delay     low IQ. 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 (H)     right hand contractures     Iron overload due to repeated red blood cell transfusions      Migraines      Multiple subsegmental pulmonary emboli without acute cor pulmonale (H) 02/01/2021     Oppositional defiant behavior      Uncomplicated asthma      Past Surgical History:   Procedure Laterality Date     AS INSERT TUNNELED CV 2 CATH W/O PORT/PUMP       C BREAST REDUCTION (INCLUDES LIPO) TIER 3 Bilateral 04/23/2019     CHOLECYSTECTOMY       IR CVC NON TUNNEL PLACEMENT  04/07/2020     REPAIR TENDON ELBOW Right 10/02/2019    Procedure: Right Elbow Flexor Lengthening, Flexor Pronator Slide Of Wrist and Finger, Thumb Adductor Lengthening;  Surgeon: Anai Franco MD;  Location: [facility] OR     TONSILLECTOMY Bilateral 10/02/2019    Procedure: Bilateral Tonsillectomy;  Surgeon: Farhana Guy MD;  Location: [facility] OR     acetaminophen (TYLENOL) 325 MG tablet  albuterol (PROAIR HFA/PROVENTIL HFA/VENTOLIN HFA) 108 (90 Base) MCG/ACT inhaler  albuterol (PROVENTIL) (2.5 MG/3ML) 0.083% neb solution  aspirin (ASPIRIN) 81 MG EC tablet  budesonide-formoterol (SYMBICORT) 160-4.5 MCG/ACT Inhaler  celecoxib (CELEBREX) 100 MG capsule  diphenhydrAMINE (BENADRYL) 25 MG capsule  EPINEPHrine (ANY BX GENERIC EQUIV) 0.3 MG/0.3ML injection 2-pack  Hydroxyurea 1000 MG TABS  JADENU 360 MG tablet  medroxyPROGESTERone (DEPO-PROVERA) 150 MG/ML IM injection  naloxone (NARCAN) 4 MG/0.1ML nasal spray  ondansetron (ZOFRAN) 8 MG tablet  oxyCODONE (OXYCONTIN) 10 MG 12 hr tablet  oxyCODONE IR (ROXICODONE)  15 MG tablet  rivaroxaban ANTICOAGULANT (XARELTO) 20 MG TABS tablet  sertraline (ZOLOFT) 100 MG tablet      Allergies   Allergen Reactions     Fish-Derived Products Hives     Seafood Hives     Contrast Dye      Diagnostic X-Ray Materials      Gadolinium      Family History  Family History   Problem Relation Age of Onset     Sickle Cell Trait Mother      Hypertension Mother      Asthma Mother      Sickle Cell Trait Father      Social History   Social History     Tobacco Use     Smoking status: Never Smoker     Smokeless tobacco: Never Used   Substance Use Topics     Alcohol use: Not Currently     Alcohol/week: 0.0 standard drinks     Drug use: Never      Past medical history, past surgical history, medications, allergies, family history, and social history were reviewed with the patient. No additional pertinent items.       Review of Systems   Constitutional: Positive for activity change and fatigue. Negative for appetite change, chills and fever.   HENT: Negative for congestion, nosebleeds, sinus pressure and trouble swallowing.    Eyes: Negative for redness and visual disturbance.   Respiratory: Negative for shortness of breath.    Cardiovascular: Negative for chest pain.   Gastrointestinal: Negative for abdominal pain and nausea.   Genitourinary: Negative for difficulty urinating, dysuria and flank pain.   Musculoskeletal: Positive for arthralgias, back pain and myalgias. Negative for joint swelling, neck pain and neck stiffness.        Positive for bilateral arm and leg pain.   Skin: Negative for color change, rash and wound.   Allergic/Immunologic: Immunocompromised state: sickle cell.   Neurological: Negative for seizures, syncope, weakness and headaches.   Hematological: Does not bruise/bleed easily.   Psychiatric/Behavioral: Positive for decreased concentration and dysphoric mood. Negative for agitation, confusion and hallucinations. The patient is not nervous/anxious.    All other systems reviewed and are  negative.    A complete review of systems was performed with pertinent positives and negatives noted in the HPI, and all other systems negative.    Physical Exam   BP: 132/74  Pulse: 82  Temp: 98.9  F (37.2  C)  Resp: 18  SpO2: 98 %  Physical Exam  Vitals signs and nursing note reviewed.   Constitutional:       General: She is in acute distress.      Appearance: She is well-developed. She is ill-appearing. She is not toxic-appearing or diaphoretic.      Comments: Patient uncomfortable soft-spoken describing symptoms consistent with sickle cell crisis flare similar to yesterday without other new findings.  Patient nontoxic.   HENT:      Head: Normocephalic and atraumatic.      Nose: Nose normal.      Mouth/Throat:      Mouth: Mucous membranes are moist.   Eyes:      General: Scleral icterus present.      Extraocular Movements: Extraocular movements intact.      Pupils: Pupils are equal, round, and reactive to light.   Neck:      Musculoskeletal: Normal range of motion and neck supple. No neck rigidity.   Cardiovascular:      Rate and Rhythm: Normal rate and regular rhythm.   Pulmonary:      Effort: No respiratory distress.      Comments: Patient does have Port-A-Cath on the left anterior chest area  Chest:      Chest wall: No tenderness.   Abdominal:      General: There is no distension.      Palpations: Abdomen is soft.      Tenderness: There is no abdominal tenderness. There is no guarding or rebound.   Musculoskeletal:         General: Tenderness present. No swelling or deformity.      Comments: Generalized joint pain consistent with sickle cell without effusions or cellulitic changes   Skin:     General: Skin is warm and dry.      Capillary Refill: Capillary refill takes less than 2 seconds.      Coloration: Skin is pale.      Findings: No erythema or rash.   Neurological:      General: No focal deficit present.      Mental Status: She is alert and oriented to person, place, and time. Mental status is at  baseline.   Psychiatric:      Comments: Flat but appropriate           ED Course         Patient valuated the ER records reviewed from [facility] and yesterday's ER visit.  Port-A-Cath was accessed IV fluids given normal saline boluses in the ER as noted.  Oxygen as needed.  Labs drawn reviewed hemoglobin 6.6 which is fairly close to patient's normal range.  There is no active signs of bleeding at this point reticulocyte count was noted from yesterday.  White count otherwise within normal limits other labs stable chemistries have been reviewed as noted below.  Total bilirubin noted be 1.9.  ALT and AST are both 60.  Urinalysis negative negative pregnancy test.    Following patient's care plan she did receive 2 mg of morphine IV every hour for total of 4 doses.  Patient felt much better is comfortable going home.        Procedures                      Results for orders placed or performed during the hospital encounter of 03/20/21   CBC with platelets differential     Status: Abnormal   Result Value Ref Range    WBC 5.3 4.0 - 11.0 10e9/L    RBC Count 1.84 (L) 3.8 - 5.2 10e12/L    Hemoglobin 6.6 (LL) 11.7 - 15.7 g/dL    Hematocrit 19.5 (L) 35.0 - 47.0 %     (H) 78 - 100 fl    MCH 35.9 (H) 26.5 - 33.0 pg    MCHC 33.8 31.5 - 36.5 g/dL    RDW 28.2 (H) 10.0 - 15.0 %    Platelet Count 347 150 - 450 10e9/L    Diff Method Manual Differential     % Neutrophils 54.8 %    % Lymphocytes 39.8 %    % Monocytes 1.8 %    % Eosinophils 1.8 %    % Basophils 1.8 %    Nucleated RBCs 11 (H) 0 /100    Absolute Neutrophil 2.9 1.6 - 8.3 10e9/L    Absolute Lymphocytes 2.1 0.8 - 5.3 10e9/L    Absolute Monocytes 0.1 0.0 - 1.3 10e9/L    Absolute Eosinophils 0.1 0.0 - 0.7 10e9/L    Absolute Basophils 0.1 0.0 - 0.2 10e9/L    Absolute Nucleated RBC 0.6     Anisocytosis Marked     Poikilocytosis Moderate     Polychromasia Moderate     Sickle Cells Moderate     Elliptocytes Moderate     Target Cells Moderate     Microcytes Present     Macrocytes  Present     Hypochromasia Present     Platelet Estimate Confirming automated cell count    Partial thromboplastin time     Status: None   Result Value Ref Range    PTT 32 22 - 37 sec   INR     Status: Abnormal   Result Value Ref Range    INR 1.34 (H) 0.86 - 1.14   Comprehensive metabolic panel     Status: Abnormal   Result Value Ref Range    Sodium 140 133 - 144 mmol/L    Potassium 3.5 3.4 - 5.3 mmol/L    Chloride 111 (H) 94 - 109 mmol/L    Carbon Dioxide 23 20 - 32 mmol/L    Anion Gap 5 3 - 14 mmol/L    Glucose 86 70 - 99 mg/dL    Urea Nitrogen 6 (L) 7 - 30 mg/dL    Creatinine 0.51 (L) 0.52 - 1.04 mg/dL    GFR Estimate >90 >60 mL/min/[1.73_m2]    GFR Estimate If Black >90 >60 mL/min/[1.73_m2]    Calcium 8.4 (L) 8.5 - 10.1 mg/dL    Bilirubin Total 1.9 (H) 0.2 - 1.3 mg/dL    Albumin 3.6 3.4 - 5.0 g/dL    Protein Total 7.6 6.8 - 8.8 g/dL    Alkaline Phosphatase 59 40 - 150 U/L    ALT 60 (H) 0 - 50 U/L    AST 60 (H) 0 - 45 U/L   UA with Microscopic reflex to Culture     Status: None    Specimen: Urine clean catch; Midstream Urine   Result Value Ref Range    Color Urine Light Yellow     Appearance Urine Clear     Glucose Urine Negative NEG^Negative mg/dL    Bilirubin Urine Negative NEG^Negative    Ketones Urine Negative NEG^Negative mg/dL    Specific Gravity Urine 1.008 1.003 - 1.035    Blood Urine Negative NEG^Negative    pH Urine 7.0 5.0 - 7.0 pH    Protein Albumin Urine Negative NEG^Negative mg/dL    Urobilinogen mg/dL Normal 0.0 - 2.0 mg/dL    Nitrite Urine Negative NEG^Negative    Leukocyte Esterase Urine Negative NEG^Negative    Source Midstream Urine     WBC Urine <1 0 - 5 /HPF    RBC Urine <1 0 - 2 /HPF    Squamous Epithelial /HPF Urine 1 0 - 1 /HPF   HCG qualitative urine     Status: None   Result Value Ref Range    HCG Qual Urine Negative NEG^Negative     Medications   0.9% sodium chloride BOLUS (0 mLs Intravenous Stopped 3/20/21 1447)     Followed by   0.9% sodium chloride BOLUS (0 mLs Intravenous Stopped  3/20/21 1811)   diphenhydrAMINE (BENADRYL) capsule 50 mg (50 mg Oral Given 3/20/21 1242)   ondansetron (ZOFRAN) injection 4 mg (4 mg Intravenous Given 3/20/21 1242)   morphine (PF) injection 2 mg (2 mg Intravenous Given 3/20/21 1448)   morphine (PF) injection 2 mg (2 mg Intravenous Given 3/20/21 1614)   albuterol (PROAIR HFA/PROVENTIL HFA/VENTOLIN HFA) 108 (90 Base) MCG/ACT inhaler 2 puff (2 puffs Inhalation Given 3/20/21 1808)   heparin 100 UNIT/ML injection 5 mL (5 mLs Intracatheter Given 3/20/21 1809)        Assessments & Plan (with Medical Decision Making)  19-year-old female presents to ER visit after seen here yesterday for ongoing sickle cell symptoms.  Patient describes diffuse joint pain throughout without swelling no fevers no other infectious ideology similarly yesterday.  Patient had Port-A-Cath access IV fluids given along with this given a total of 4 doses of 2 mg of morphine IV per patient's protocol.  Patient feels much better patient with ongoing anemia hemoglobin 6.6 which is close to baseline.  Total bilirubin 1.9 ALT and AST 60.  Patient otherwise feels much better at this point is comfortably discharged will follow up with hematologist along with infusion clinic will return if any concerns at all.  Patient then discharged as noted.           I have reviewed the nursing notes. I have reviewed the findings, diagnosis, plan and need for follow up with the patient.    Discharge Medication List as of 3/20/2021  6:11 PM          Final diagnoses:   Sickle cell pain crisis (H)   Moderate persistent asthma, unspecified whether complicated       --  I, Len Fernandes, am serving as a trained medical scribe to document services personally performed by Pedro Ryan MD, based on the provider's statements to me.     I, Pedro Ryan MD, was physically present and have reviewed and verified the accuracy of this note documented by Len Fernandes.    Pedro Ryan MD  [facility] EMERGENCY  DEPARTMENT  3/20/2021    This note was created at least in part by the use of dragon voice dictation system. Inadvertent typographical errors may still exist.  Pedro Ryan MD.    Patient evaluated in the emergency department during the COVID-19 pandemic period. Careful attention to patients safety was addressed throughout the evaluation. Evaluation and treatment management was initiated with disposition made efficiently and appropriate as possible to minimize any risk of potential exposure to patient during this evaluation.       Pedro Ryan MD  03/21/21 1958

## 2021-03-21 ENCOUNTER — HOME INFUSION (PRE-WILLOW HOME INFUSION) (OUTPATIENT)
Dept: PHARMACY | Facility: CLINIC | Age: 21
End: 2021-03-21

## 2021-03-21 ENCOUNTER — HOSPITAL ENCOUNTER (EMERGENCY)
Facility: CLINIC | Age: 21
Discharge: HOME | End: 2021-03-21
Attending: EMERGENCY MEDICINE | Admitting: EMERGENCY MEDICINE
Payer: COMMERCIAL

## 2021-03-21 VITALS
OXYGEN SATURATION: 97 % | TEMPERATURE: 98.8 F | DIASTOLIC BLOOD PRESSURE: 69 MMHG | HEIGHT: 64 IN | SYSTOLIC BLOOD PRESSURE: 156 MMHG | HEART RATE: 108 BPM | BODY MASS INDEX: 28.32 KG/M2 | RESPIRATION RATE: 18 BRPM

## 2021-03-21 DIAGNOSIS — D57.00 SICKLE CELL PAIN CRISIS (H): ICD-10-CM

## 2021-03-21 LAB
ANION GAP SERPL CALCULATED.3IONS-SCNC: 4 MMOL/L (ref 3–14)
ANISOCYTOSIS BLD QL SMEAR: ABNORMAL
BASOPHILS # BLD AUTO: 0.1 10E9/L (ref 0–0.2)
BASOPHILS NFR BLD AUTO: 2.7 %
BUN SERPL-MCNC: 6 MG/DL (ref 7–30)
CALCIUM SERPL-MCNC: 8.6 MG/DL (ref 8.5–10.1)
CHLORIDE SERPL-SCNC: 110 MMOL/L (ref 94–109)
CO2 SERPL-SCNC: 25 MMOL/L (ref 20–32)
CREAT SERPL-MCNC: 0.51 MG/DL (ref 0.52–1.04)
DIFFERENTIAL METHOD BLD: ABNORMAL
ELLIPTOCYTES BLD QL SMEAR: ABNORMAL
EOSINOPHIL # BLD AUTO: 0 10E9/L (ref 0–0.7)
EOSINOPHIL NFR BLD AUTO: 0.9 %
ERYTHROCYTE [DISTWIDTH] IN BLOOD BY AUTOMATED COUNT: 27.3 % (ref 10–15)
GFR SERPL CREATININE-BSD FRML MDRD: >90 ML/MIN/{1.73_M2}
GLUCOSE SERPL-MCNC: 83 MG/DL (ref 70–99)
HCT VFR BLD AUTO: 20.9 % (ref 35–47)
HGB BLD-MCNC: 7 G/DL (ref 11.7–15.7)
HOWELL-JOLLY BOD BLD QL SMEAR: PRESENT
LYMPHOCYTES # BLD AUTO: 1 10E9/L (ref 0.8–5.3)
LYMPHOCYTES NFR BLD AUTO: 29.5 %
MACROCYTES BLD QL SMEAR: PRESENT
MCH RBC QN AUTO: 35.5 PG (ref 26.5–33)
MCHC RBC AUTO-ENTMCNC: 33.5 G/DL (ref 31.5–36.5)
MCV RBC AUTO: 106 FL (ref 78–100)
MONOCYTES # BLD AUTO: 0 10E9/L (ref 0–1.3)
MONOCYTES NFR BLD AUTO: 0.9 %
NEUTROPHILS # BLD AUTO: 2.2 10E9/L (ref 1.6–8.3)
NEUTROPHILS NFR BLD AUTO: 66 %
NRBC # BLD AUTO: 0.5 10*3/UL
NRBC BLD AUTO-RTO: 13 /100
OVALOCYTES BLD QL SMEAR: SLIGHT
PLATELET # BLD AUTO: 308 10E9/L (ref 150–450)
PLATELET # BLD EST: ABNORMAL 10*3/UL
POIKILOCYTOSIS BLD QL SMEAR: ABNORMAL
POLYCHROMASIA BLD QL SMEAR: ABNORMAL
POTASSIUM SERPL-SCNC: 3.5 MMOL/L (ref 3.4–5.3)
RBC # BLD AUTO: 1.97 10E12/L (ref 3.8–5.2)
RETICS # AUTO: 205.8 10E9/L (ref 25–95)
RETICS/RBC NFR AUTO: 10.6 % (ref 0.5–2)
SODIUM SERPL-SCNC: 139 MMOL/L (ref 133–144)
TARGETS BLD QL SMEAR: SLIGHT
WBC # BLD AUTO: 3.4 10E9/L (ref 4–11)

## 2021-03-21 PROCEDURE — 99285 EMERGENCY DEPT VISIT HI MDM: CPT | Mod: 25

## 2021-03-21 PROCEDURE — 96374 THER/PROPH/DIAG INJ IV PUSH: CPT

## 2021-03-21 PROCEDURE — 80048 BASIC METABOLIC PNL TOTAL CA: CPT | Performed by: EMERGENCY MEDICINE

## 2021-03-21 PROCEDURE — 99285 EMERGENCY DEPT VISIT HI MDM: CPT | Performed by: EMERGENCY MEDICINE

## 2021-03-21 PROCEDURE — 250N000013 HC RX MED GY IP 250 OP 250 PS 637: Performed by: EMERGENCY MEDICINE

## 2021-03-21 PROCEDURE — 96376 TX/PRO/DX INJ SAME DRUG ADON: CPT

## 2021-03-21 PROCEDURE — 85025 COMPLETE CBC W/AUTO DIFF WBC: CPT | Performed by: EMERGENCY MEDICINE

## 2021-03-21 PROCEDURE — 258N000003 HC RX IP 258 OP 636: Performed by: EMERGENCY MEDICINE

## 2021-03-21 PROCEDURE — 250N000011 HC RX IP 250 OP 636: Performed by: EMERGENCY MEDICINE

## 2021-03-21 PROCEDURE — 85045 AUTOMATED RETICULOCYTE COUNT: CPT | Performed by: EMERGENCY MEDICINE

## 2021-03-21 PROCEDURE — 96361 HYDRATE IV INFUSION ADD-ON: CPT

## 2021-03-21 RX ORDER — OXYCODONE HYDROCHLORIDE 5 MG/1
15 TABLET ORAL ONCE
Status: COMPLETED | OUTPATIENT
Start: 2021-03-21 | End: 2021-03-21

## 2021-03-21 RX ORDER — SODIUM CHLORIDE, SODIUM LACTATE, POTASSIUM CHLORIDE, CALCIUM CHLORIDE 600; 310; 30; 20 MG/100ML; MG/100ML; MG/100ML; MG/100ML
1000 INJECTION, SOLUTION INTRAVENOUS CONTINUOUS
Status: DISCONTINUED | OUTPATIENT
Start: 2021-03-21 | End: 2021-03-21 | Stop reason: HOSPADM

## 2021-03-21 RX ORDER — MORPHINE SULFATE 2 MG/ML
2 INJECTION, SOLUTION INTRAMUSCULAR; INTRAVENOUS
Status: COMPLETED | OUTPATIENT
Start: 2021-03-21 | End: 2021-03-21

## 2021-03-21 RX ORDER — CELECOXIB 100 MG/1
100 CAPSULE ORAL ONCE
Status: COMPLETED | OUTPATIENT
Start: 2021-03-21 | End: 2021-03-21

## 2021-03-21 RX ORDER — HEPARIN SODIUM (PORCINE) LOCK FLUSH IV SOLN 100 UNIT/ML 100 UNIT/ML
5 SOLUTION INTRAVENOUS ONCE
Status: COMPLETED | OUTPATIENT
Start: 2021-03-21 | End: 2021-03-21

## 2021-03-21 RX ADMIN — MORPHINE SULFATE 2 MG: 2 INJECTION, SOLUTION INTRAMUSCULAR; INTRAVENOUS at 11:52

## 2021-03-21 RX ADMIN — OXYCODONE HYDROCHLORIDE 15 MG: 5 TABLET ORAL at 18:05

## 2021-03-21 RX ADMIN — CELECOXIB 100 MG: 100 CAPSULE ORAL at 18:05

## 2021-03-21 RX ADMIN — Medication 5 ML: at 18:00

## 2021-03-21 RX ADMIN — MORPHINE SULFATE 2 MG: 2 INJECTION, SOLUTION INTRAMUSCULAR; INTRAVENOUS at 14:50

## 2021-03-21 RX ADMIN — MORPHINE SULFATE 2 MG: 2 INJECTION, SOLUTION INTRAMUSCULAR; INTRAVENOUS at 13:15

## 2021-03-21 RX ADMIN — SODIUM CHLORIDE, POTASSIUM CHLORIDE, SODIUM LACTATE AND CALCIUM CHLORIDE 1000 ML: 600; 310; 30; 20 INJECTION, SOLUTION INTRAVENOUS at 11:52

## 2021-03-21 ASSESSMENT — ENCOUNTER SYMPTOMS
SEIZURES: 0
FLANK PAIN: 0
APPETITE CHANGE: 0
DYSPHORIC MOOD: 1
BACK PAIN: 1
NECK PAIN: 0
HEMATURIA: 0
HALLUCINATIONS: 0
BRUISES/BLEEDS EASILY: 0
WOUND: 0
DECREASED CONCENTRATION: 1
CHILLS: 0
TROUBLE SWALLOWING: 0
COUGH: 0
FEVER: 0
JOINT SWELLING: 0
MYALGIAS: 1
NERVOUS/ANXIOUS: 0
AGITATION: 0
FATIGUE: 1
SINUS PRESSURE: 0
SORE THROAT: 0
DYSURIA: 0
ARTHRALGIAS: 1
ACTIVITY CHANGE: 1

## 2021-03-21 NOTE — ED PROVIDER NOTES
ED Provider Note  [facility]      History     Chief Complaint   Patient presents with     Sickle Cell Pain Crisis     The history is provided by the patient and medical records.     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 Yesterday, she was seen in the [place] ED with a continuation of her back, bilateral arm and leg pain.  She reports that her pain has been ongoing since she was discharged from the hospital on 3/17/2021.  The patient's labs from yesterday were generally improving compared to previous visits.  She was able to go through her full protocol yesterday and was later discharged.  Today, she reports that her pain is still in her back and bilateral legs.  She has had temporary relief since onset but is still having the pain.  The patient did not take any pain medication last night but did take oxycodone this morning.  She is working on getting a prescription for OxyContin for more long-lasting effect but is waiting on authorization.  Of note, the patient was on OxyContin a few months ago but stopped as she felt like it was not helping.  The patient denies any change in her symptoms since yesterday.  She denies any cough, fever, dysuria, hematuria, rash or sore throat.      Per review of the patient's medical record, they were seen at the [place] ED on 3/17/2021 for low back, bilateral thigh pain, most consistent with the patient's previous sickle cell pain crises. Patient was subsequently worked up with a venous ultrasound of the bilateral lower extremities which showed no evidence of DVT. Patient's labs were notable for hemoglobin at 7.3 (up from previous at 6.7). Patient was subsequently treated with bolus of NS, ketorolac, and morphine with improvement in symptoms.  Patient was subsequently discharged with plan to  follow-up with her primary hematologist.    Per the patient's medical record, the patient was seen on 3/19/2021 for a sickle cell pain crisis that she described as a continuation of her pain that is similar to previous sickle cell exacerbations.  She did not have any fever, chest pain or shortness of breath.  The patient was unable to get to the infusion center and was instructed to increase her dosing of oxycodone at home from every 6 hours to every 4 hours.  Patient did receive her usual dosing and was later discharged.    Past Medical History  Past Medical History:   Diagnosis Date     Anemia      Anxiety      Bleeding disorder (H)      Blood clotting disorder (H)      Cerebral infarction (H) 2015     Cognitive developmental delay     low IQ. 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 (H)     right hand contractures     Iron overload due to repeated red blood cell transfusions      Migraines      Multiple subsegmental pulmonary emboli without acute cor pulmonale (H) 02/01/2021     Oppositional defiant behavior      Uncomplicated asthma      Past Surgical History:   Procedure Laterality Date     AS INSERT TUNNELED CV 2 CATH W/O PORT/PUMP       C BREAST REDUCTION (INCLUDES LIPO) TIER 3 Bilateral 04/23/2019     CHOLECYSTECTOMY       IR CVC NON TUNNEL PLACEMENT  04/07/2020     REPAIR TENDON ELBOW Right 10/02/2019    Procedure: Right Elbow Flexor Lengthening, Flexor Pronator Slide Of Wrist and Finger, Thumb Adductor Lengthening;  Surgeon: Anai Franco MD;  Location: [facility] OR     TONSILLECTOMY Bilateral 10/02/2019    Procedure: Bilateral Tonsillectomy;  Surgeon: Farhana Guy MD;  Location: [facility] OR     acetaminophen (TYLENOL) 325 MG tablet  albuterol (PROAIR HFA/PROVENTIL HFA/VENTOLIN HFA) 108 (90 Base) MCG/ACT inhaler  albuterol (PROVENTIL) (2.5 MG/3ML) 0.083% neb  "solution  aspirin (ASPIRIN) 81 MG EC tablet  budesonide-formoterol (SYMBICORT) 160-4.5 MCG/ACT Inhaler  celecoxib (CELEBREX) 100 MG capsule  diphenhydrAMINE (BENADRYL) 25 MG capsule  EPINEPHrine (ANY BX GENERIC EQUIV) 0.3 MG/0.3ML injection 2-pack  Hydroxyurea 1000 MG TABS  JADENU 360 MG tablet  medroxyPROGESTERone (DEPO-PROVERA) 150 MG/ML IM injection  naloxone (NARCAN) 4 MG/0.1ML nasal spray  ondansetron (ZOFRAN) 8 MG tablet  oxyCODONE (OXYCONTIN) 10 MG 12 hr tablet  oxyCODONE IR (ROXICODONE) 15 MG tablet  rivaroxaban ANTICOAGULANT (XARELTO) 20 MG TABS tablet  sertraline (ZOLOFT) 100 MG tablet      Allergies   Allergen Reactions     Fish-Derived Products Hives     Seafood Hives     Contrast Dye      Diagnostic X-Ray Materials      Gadolinium      Family History  Family History   Problem Relation Age of Onset     Sickle Cell Trait Mother      Hypertension Mother      Asthma Mother      Sickle Cell Trait Father      Social History   Social History     Tobacco Use     Smoking status: Never Smoker     Smokeless tobacco: Never Used   Substance Use Topics     Alcohol use: Not Currently     Alcohol/week: 0.0 standard drinks     Drug use: Never      Past medical history, past surgical history, medications, allergies, family history, and social history were reviewed with the patient. No additional pertinent items.       Review of Systems   Constitutional: Negative for fever.   HENT: Negative for sore throat.    Respiratory: Negative for cough.    Genitourinary: Negative for dysuria and hematuria.   Musculoskeletal: Positive for back pain.        Positive for bilateral leg pain.   Skin: Negative for rash.   All other systems reviewed and are negative.    A complete review of systems was performed with pertinent positives and negatives noted in the HPI, and all other systems negative.    Physical Exam   BP: 134/78  Pulse: 111  Temp: 98.8  F (37.1  C)  Resp: 18  Height: 162.6 cm (5' 4\")  SpO2: 96 %     Physical " Exam  Gen:A&Ox3, no acute distress  HEENT:PERRL, no facial tenderness or wounds, head atraumatic, mucous membranes moist  Neck:no bony tenderness or step offs, no JVD, trachea midline  Back: no CVA tenderness, spine palpation does not worsen her pain  CV:RRR without murmurs  PULM:Clear to auscultation bilaterally  Abd:soft, nontender, nondistended. Bowel sounds present and normal  UE:No traumatic injuries, skin normal, no joint swelling or warmth  LE:no traumatic injuries, skin normal, no LE edema.   Neuro:chronic partial paralysis of right arm and leg  Skin: no rashes or ecchymoses    ED Course       11:29 AM  The patient was seen and examined by Cornelia Malloy MD in Room ED23.   Procedures           Results for orders placed or performed during the hospital encounter of 03/21/21   CBC with platelets differential     Status: Abnormal   Result Value Ref Range    WBC 3.4 (L) 4.0 - 11.0 10e9/L    RBC Count 1.97 (L) 3.8 - 5.2 10e12/L    Hemoglobin 7.0 (L) 11.7 - 15.7 g/dL    Hematocrit 20.9 (L) 35.0 - 47.0 %     (H) 78 - 100 fl    MCH 35.5 (H) 26.5 - 33.0 pg    MCHC 33.5 31.5 - 36.5 g/dL    RDW 27.3 (H) 10.0 - 15.0 %    Platelet Count 308 150 - 450 10e9/L    Diff Method Manual Differential     % Neutrophils 66.0 %    % Lymphocytes 29.5 %    % Monocytes 0.9 %    % Eosinophils 0.9 %    % Basophils 2.7 %    Nucleated RBCs 13 (H) 0 /100    Absolute Neutrophil 2.2 1.6 - 8.3 10e9/L    Absolute Lymphocytes 1.0 0.8 - 5.3 10e9/L    Absolute Monocytes 0.0 0.0 - 1.3 10e9/L    Absolute Eosinophils 0.0 0.0 - 0.7 10e9/L    Absolute Basophils 0.1 0.0 - 0.2 10e9/L    Absolute Nucleated RBC 0.5     Anisocytosis Marked     Poikilocytosis Moderate     Polychromasia Marked     Ovalocytes Slight     Elliptocytes Moderate     Target Cells Slight     Macrocytes Present     White [place] Bodies Present     Platelet Estimate Confirming automated cell count    Basic metabolic panel     Status: Abnormal   Result Value Ref Range    Sodium  139 133 - 144 mmol/L    Potassium 3.5 3.4 - 5.3 mmol/L    Chloride 110 (H) 94 - 109 mmol/L    Carbon Dioxide 25 20 - 32 mmol/L    Anion Gap 4 3 - 14 mmol/L    Glucose 83 70 - 99 mg/dL    Urea Nitrogen 6 (L) 7 - 30 mg/dL    Creatinine 0.51 (L) 0.52 - 1.04 mg/dL    GFR Estimate >90 >60 mL/min/[1.73_m2]    GFR Estimate If Black >90 >60 mL/min/[1.73_m2]    Calcium 8.6 8.5 - 10.1 mg/dL   Reticulocyte count     Status: Abnormal   Result Value Ref Range    % Retic 10.6 (H) 0.5 - 2.0 %    Absolute Retic 205.8 (H) 25 - 95 10e9/L     Medications   lactated ringers infusion (has no administration in time range)   sodium chloride (PF) 0.9% PF flush 10-20 mL (has no administration in time range)   lactated ringers BOLUS 1,000 mL (0 mLs Intravenous Stopped 3/21/21 1444)   morphine (PF) injection 2 mg (2 mg Intravenous Given 3/21/21 1450)   oxyCODONE (ROXICODONE) tablet 15 mg (15 mg Oral Given 3/21/21 1805)   celecoxib (celeBREX) capsule 100 mg (100 mg Oral Given 3/21/21 1805)   heparin 100 UNIT/ML injection 5 mL (5 mLs Intracatheter Given 3/21/21 1800)        Assessments & Plan (with Medical Decision Making)   20 yo F with a hx of sickle cell anemia, pulmonary embolism (on ribaroxaban), CVA and asthma. Presenting with pain. Located in back and extremities, and typical of her sickle cell pain.   Pt states that she is working with her outpatient providers to get restarted on long-acting opiates, but has access to her usual home short acting medications.   Arrives afebrile and hemodynamically stable.   IV access obtained via port-a-cath.   Hgb today is 7.0. WBC 3.4. Plts 308. Retic count 10.6.  Had UA yesterday that was without UTI or hematuria.   Her pain management care plan was reviewed and initiated. She declined need for zofran or benadryl.   Given IV LR bolus and infusion.   Given morphine 2mg IV x3 per plan.   She was reassessed and pain continues but she feels it is not significant enough at this time to warrant admission.    We began transitioning her back to her home medication, with a dose of PO oxycodone and celebrex prior to discharge.   Follow up with Heme Onc.     I have reviewed the nursing notes. I have reviewed the findings, diagnosis, plan and need for follow up with the patient.    Discharge Medication List as of 3/21/2021  4:50 PM          Final diagnoses:   Sickle cell pain crisis (H)       --  I, Len Fernandes, am serving as a trained medical scribe to document services personally performed by Cornelia Malloy MD, based on the provider's statements to me.     I, Cornelia Malloy MD, was physically present and have reviewed and verified the accuracy of this note documented by Len Fernandes.    Cornelia Malloy MD [facility] EMERGENCY DEPARTMENT  3/21/2021     Cornelia Malloy MD  03/21/21 2444

## 2021-03-21 NOTE — ED TRIAGE NOTES
Pt c/o back and leg pain d/t sickle cell. Pt has been seen in the ED the past 2 days and states pain only got better for a short time.

## 2021-03-21 NOTE — DISCHARGE INSTRUCTIONS
Thank you for coming to the [facility] Emergency Department.   Please follow up with your hematologist this week if pain continues to be hard to manage at home.     Also return if you develop fever >100.4F, cough and shortness of breathing concerning for acute chest syndrome, or abdominal pain/vomiting.

## 2021-03-22 ENCOUNTER — TELEPHONE (OUTPATIENT)
Dept: ONCOLOGY | Facility: CLINIC | Age: 21
End: 2021-03-22

## 2021-03-22 ENCOUNTER — INFUSION THERAPY VISIT (OUTPATIENT)
Dept: ONCOLOGY | Facility: CLINIC | Age: 21
End: 2021-03-22
Attending: PEDIATRICS
Payer: COMMERCIAL

## 2021-03-22 ENCOUNTER — PATIENT OUTREACH (OUTPATIENT)
Dept: CARE COORDINATION | Facility: CLINIC | Age: 21
End: 2021-03-22

## 2021-03-22 VITALS
RESPIRATION RATE: 16 BRPM | OXYGEN SATURATION: 97 % | SYSTOLIC BLOOD PRESSURE: 126 MMHG | DIASTOLIC BLOOD PRESSURE: 75 MMHG | HEART RATE: 82 BPM | TEMPERATURE: 98.1 F

## 2021-03-22 DIAGNOSIS — D57.00 SICKLE CELL PAIN CRISIS (H): Primary | ICD-10-CM

## 2021-03-22 PROCEDURE — 258N000003 HC RX IP 258 OP 636: Performed by: PEDIATRICS

## 2021-03-22 PROCEDURE — 96376 TX/PRO/DX INJ SAME DRUG ADON: CPT

## 2021-03-22 PROCEDURE — 250N000011 HC RX IP 250 OP 636: Performed by: PEDIATRICS

## 2021-03-22 PROCEDURE — 96361 HYDRATE IV INFUSION ADD-ON: CPT

## 2021-03-22 PROCEDURE — 96374 THER/PROPH/DIAG INJ IV PUSH: CPT

## 2021-03-22 RX ORDER — ONDANSETRON 8 MG/1
8 TABLET, FILM COATED ORAL
Status: CANCELLED
Start: 2021-03-22

## 2021-03-22 RX ORDER — MORPHINE SULFATE 2 MG/ML
2 INJECTION, SOLUTION INTRAMUSCULAR; INTRAVENOUS
Status: COMPLETED | OUTPATIENT
Start: 2021-03-22 | End: 2021-03-22

## 2021-03-22 RX ORDER — DIPHENHYDRAMINE HCL 25 MG
25 CAPSULE ORAL
Status: CANCELLED
Start: 2021-03-22

## 2021-03-22 RX ORDER — MORPHINE SULFATE 2 MG/ML
2 INJECTION, SOLUTION INTRAMUSCULAR; INTRAVENOUS
Status: CANCELLED
Start: 2021-03-22

## 2021-03-22 RX ORDER — HEPARIN SODIUM,PORCINE 10 UNIT/ML
5 VIAL (ML) INTRAVENOUS
Status: CANCELLED | OUTPATIENT
Start: 2021-03-22

## 2021-03-22 RX ORDER — HEPARIN SODIUM (PORCINE) LOCK FLUSH IV SOLN 100 UNIT/ML 100 UNIT/ML
5 SOLUTION INTRAVENOUS
Status: CANCELLED | OUTPATIENT
Start: 2021-03-22

## 2021-03-22 RX ORDER — HEPARIN SODIUM (PORCINE) LOCK FLUSH IV SOLN 100 UNIT/ML 100 UNIT/ML
5 SOLUTION INTRAVENOUS
Status: DISCONTINUED | OUTPATIENT
Start: 2021-03-22 | End: 2021-03-22 | Stop reason: HOSPADM

## 2021-03-22 RX ADMIN — Medication 5 ML: at 16:17

## 2021-03-22 RX ADMIN — MORPHINE SULFATE 2 MG: 2 INJECTION, SOLUTION INTRAMUSCULAR; INTRAVENOUS at 14:34

## 2021-03-22 RX ADMIN — DEXTROSE AND SODIUM CHLORIDE 500 ML: 5; 450 INJECTION, SOLUTION INTRAVENOUS at 13:25

## 2021-03-22 RX ADMIN — MORPHINE SULFATE 2 MG: 2 INJECTION, SOLUTION INTRAMUSCULAR; INTRAVENOUS at 15:29

## 2021-03-22 RX ADMIN — MORPHINE SULFATE 2 MG: 2 INJECTION, SOLUTION INTRAMUSCULAR; INTRAVENOUS at 13:30

## 2021-03-22 NOTE — TELEPHONE ENCOUNTER
"Pt called in to triage requesting IVF pain meds for \"all over\" pain rated 9/10 x3-4 days. Was in ED 3/17, the clinic for IVF pain meds 3/18, ED again 3/19, 3/20, 3/21 for pain. Asked pt if she feels she should be admitted she stated \"I don't know, I don't want to.\" Stated pain feels like usual sickle cell pain, no swollen joints.    Stated last took prn Oxy, ibuprofen, celebrex 8 am this morning without relief. Still waiting for PA to go through for Oxycontin, felt pain was better controlled when on this. Denied any fevers, chills, cough, sob, chest pain or other symptoms.     Pt denied being out of home medications and needing any refills today. Pt has follow-up in clinic with Dr. Duncan on Friday 3/26. Paged Dr. Duncan.    "

## 2021-03-22 NOTE — TELEPHONE ENCOUNTER
Per Dr. Duncan: see ANDREAS earlier, no labs needed. Ok for ivf pain meds today. Pt on infusion wait list. RNCC to check on PA status of oxycontin.

## 2021-03-22 NOTE — PROGRESS NOTES
Infusion Nursing Note:  Jennifer Cervantes presents today for IVF and Pain Medications.    Patient seen by provider today: No   present during visit today: Not Applicable.    Note: Pt arrives to infusion feeling fine other than 9/10 back pain. She denies fever, chills, cough, sob, dizziness or any other new concerns or complaints.     Upon discharge, pt rated her pain to be a 7/10 and does not request any further intervention.     Intravenous Access:  Implanted Port.    Treatment Conditions:  Not Applicable.    Post Infusion Assessment:  Patient tolerated infusion without incident.  Blood return noted pre and post infusion.  Site patent and intact, free from redness, edema or discomfort.  No evidence of extravasations.  Access discontinued per protocol.     Discharge Plan:   Patient declined prescription refills.  AVS printed and given to pt.  Patient will return 3/23 for next appointment with RONALDO Meza and infusion.  Patient discharged in stable condition accompanied by: self.  Departure Mode: Ambulatory.  Face to Face time: 0.    Zoraida Orozco RN

## 2021-03-22 NOTE — TELEPHONE ENCOUNTER
Pt scheduled for [facility] infusion at 1 pm today, no labs. Will see Patricia tomorrow at 10:20 am in clinic, with infusion to be set up, asked pt to find out from infusion what time infusion is tomorrow, if it should be earlier than Patricia's apt, will need to follow-up with [employer] to have rides corrected. Patricia also asked for infusion set up Friday after Dr. Duncan's apt. [employer] paged and updated on encounter, scheduling messaged on all requests.

## 2021-03-22 NOTE — PROGRESS NOTES
This is a recent snapshot of the patient's [facility] medical record.  For current drug dose and complete information and questions, call [phone] or In Basket pool, fv home infusion ([id number])  CSN Number:  [id number]

## 2021-03-22 NOTE — PROGRESS NOTES
Social Work Follow-Up   [facility]    Data/Intervention:  Patient Name:  Jennifer Cervantes  /Age:  2000 (21 year old)    Reason for Follow-Up:  Transportation    Collaborated With:    -pt  [facility]    Intervention/Education/Resources Provided:  SW received page that pt needs ride set up for last minute appointment today at 1 pm and 3/23/2021 at 10:20 am. Ride has been set up through [employer] with a will call return ride on both days. SW called pt and informed them of their ride details.     Assessment/Plan:  SW will remain available as needed.  Previously provided patient/family with writer's contact information and availability.       CARLOS Chavez,LGSW  Hematology/Oncology Social Worker  Phone:[phone] Pager: [phone]

## 2021-03-22 NOTE — TELEPHONE ENCOUNTER
Caron Martinez CPhT  [facility]  Oncology Pharmacy Liaison  [email or web address]  Phone: [phone]  Fax: [phone]

## 2021-03-23 ENCOUNTER — ONCOLOGY VISIT (OUTPATIENT)
Dept: ONCOLOGY | Facility: CLINIC | Age: 21
End: 2021-03-23
Attending: PHYSICIAN ASSISTANT
Payer: COMMERCIAL

## 2021-03-23 ENCOUNTER — INFUSION THERAPY VISIT (OUTPATIENT)
Dept: ONCOLOGY | Facility: CLINIC | Age: 21
End: 2021-03-23
Attending: PEDIATRICS
Payer: COMMERCIAL

## 2021-03-23 VITALS
WEIGHT: 164 LBS | SYSTOLIC BLOOD PRESSURE: 127 MMHG | BODY MASS INDEX: 28.15 KG/M2 | HEART RATE: 99 BPM | TEMPERATURE: 98.5 F | RESPIRATION RATE: 16 BRPM | DIASTOLIC BLOOD PRESSURE: 85 MMHG | OXYGEN SATURATION: 97 %

## 2021-03-23 DIAGNOSIS — F32.A DEPRESSION, UNSPECIFIED DEPRESSION TYPE: ICD-10-CM

## 2021-03-23 DIAGNOSIS — D57.00 HB-SS DISEASE WITH CRISIS (H): Primary | ICD-10-CM

## 2021-03-23 DIAGNOSIS — D57.00 SICKLE CELL PAIN CRISIS (H): Primary | ICD-10-CM

## 2021-03-23 DIAGNOSIS — G89.4 CHRONIC PAIN SYNDROME: ICD-10-CM

## 2021-03-23 PROCEDURE — G0463 HOSPITAL OUTPT CLINIC VISIT: HCPCS

## 2021-03-23 PROCEDURE — 96376 TX/PRO/DX INJ SAME DRUG ADON: CPT

## 2021-03-23 PROCEDURE — 999N001193 HC VIDEO/TELEPHONE VISIT; NO CHARGE

## 2021-03-23 PROCEDURE — 96374 THER/PROPH/DIAG INJ IV PUSH: CPT

## 2021-03-23 PROCEDURE — 258N000003 HC RX IP 258 OP 636: Performed by: PEDIATRICS

## 2021-03-23 PROCEDURE — 96361 HYDRATE IV INFUSION ADD-ON: CPT

## 2021-03-23 PROCEDURE — 250N000011 HC RX IP 250 OP 636: Performed by: PEDIATRICS

## 2021-03-23 PROCEDURE — 99214 OFFICE O/P EST MOD 30 MIN: CPT | Performed by: PHYSICIAN ASSISTANT

## 2021-03-23 RX ORDER — HEPARIN SODIUM,PORCINE 10 UNIT/ML
5 VIAL (ML) INTRAVENOUS
Status: CANCELLED | OUTPATIENT
Start: 2021-03-23

## 2021-03-23 RX ORDER — MORPHINE SULFATE 2 MG/ML
2 INJECTION, SOLUTION INTRAMUSCULAR; INTRAVENOUS
Status: COMPLETED | OUTPATIENT
Start: 2021-03-23 | End: 2021-03-23

## 2021-03-23 RX ORDER — ONDANSETRON 8 MG/1
8 TABLET, FILM COATED ORAL
Status: CANCELLED
Start: 2021-03-23

## 2021-03-23 RX ORDER — MORPHINE SULFATE 2 MG/ML
2 INJECTION, SOLUTION INTRAMUSCULAR; INTRAVENOUS
Status: CANCELLED
Start: 2021-03-23

## 2021-03-23 RX ORDER — ONDANSETRON 8 MG/1
8 TABLET, ORALLY DISINTEGRATING ORAL ONCE
Status: DISCONTINUED | OUTPATIENT
Start: 2021-03-23 | End: 2021-03-23 | Stop reason: HOSPADM

## 2021-03-23 RX ORDER — DIPHENHYDRAMINE HCL 25 MG
25 CAPSULE ORAL
Status: DISCONTINUED | OUTPATIENT
Start: 2021-03-23 | End: 2021-03-23 | Stop reason: HOSPADM

## 2021-03-23 RX ORDER — HEPARIN SODIUM (PORCINE) LOCK FLUSH IV SOLN 100 UNIT/ML 100 UNIT/ML
5 SOLUTION INTRAVENOUS
Status: DISCONTINUED | OUTPATIENT
Start: 2021-03-23 | End: 2021-03-23 | Stop reason: HOSPADM

## 2021-03-23 RX ORDER — DIPHENHYDRAMINE HCL 25 MG
25 CAPSULE ORAL
Status: CANCELLED
Start: 2021-03-23

## 2021-03-23 RX ORDER — ARIPIPRAZOLE 2 MG/1
2 TABLET ORAL DAILY
Qty: 30 TABLET | Refills: 3 | Status: SHIPPED | OUTPATIENT
Start: 2021-03-23 | End: 2021-06-04

## 2021-03-23 RX ORDER — HEPARIN SODIUM (PORCINE) LOCK FLUSH IV SOLN 100 UNIT/ML 100 UNIT/ML
5 SOLUTION INTRAVENOUS
Status: CANCELLED | OUTPATIENT
Start: 2021-03-23

## 2021-03-23 RX ORDER — ONDANSETRON 8 MG/1
8 TABLET, FILM COATED ORAL
Status: DISCONTINUED | OUTPATIENT
Start: 2021-03-23 | End: 2021-03-23 | Stop reason: HOSPADM

## 2021-03-23 RX ADMIN — MORPHINE SULFATE 2 MG: 2 INJECTION, SOLUTION INTRAMUSCULAR; INTRAVENOUS at 13:53

## 2021-03-23 RX ADMIN — MORPHINE SULFATE 2 MG: 2 INJECTION, SOLUTION INTRAMUSCULAR; INTRAVENOUS at 12:54

## 2021-03-23 RX ADMIN — Medication 5 ML: at 14:13

## 2021-03-23 RX ADMIN — MORPHINE SULFATE 2 MG: 2 INJECTION, SOLUTION INTRAMUSCULAR; INTRAVENOUS at 11:53

## 2021-03-23 RX ADMIN — DEXTROSE AND SODIUM CHLORIDE 500 ML: 5; 450 INJECTION, SOLUTION INTRAVENOUS at 11:53

## 2021-03-23 ASSESSMENT — PAIN SCALES - GENERAL: PAINLEVEL: EXTREME PAIN (9)

## 2021-03-23 NOTE — LETTER
Date:November 8, 2021      Provider requested that no letter be sent. Do not send.       [facility]

## 2021-03-23 NOTE — PROGRESS NOTES
Infusion Nursing Note:  Jennifer Cervantes presents today for IVF/pain meds.    Patient seen by provider today: Yes: Patricia HIGGINS   present during visit today: Not Applicable.    Note: Patient presents to infusion with 9/10 low back pain. Heating pack given as well for discomfort. Patient denies acute complaints or concerns not  addressed during visit with Patricia HIGGINS. Patient didmeet criteria for an asymptomatic covid-19 PCR test in infusion today. Patient declined the covid-19 test.    Intravenous Access:  Implanted Port.    Treatment Conditions:  Not Applicable.      Post Infusion Assessment:  Patient tolerated infusion without incident.  Post 3 doses of IV Morphine and 500mls of fluids, patient rated pain a 7/10 and states she is comfortable going home. Patient is aware to seek medical assistance via ER if pain becomes too severe at home.   Blood return noted pre and post infusion.  Site patent and intact, free from redness, edema or discomfort.  No evidence of extravasations.  Access discontinued per protocol.       Discharge Plan:   Prescription refills given for Voltaren Gel, Abilify.  Discharge instructions reviewed with: Patient.  Patient and/or family verbalized understanding of discharge instructions and all questions answered.  AVS to patient via [employer].  Patient will return 3/26 for next appointment.   Patient discharged in stable condition accompanied by: self.  Departure Mode: Ambulatory.  Face to Face time: 0 minutes.    Rik Ayers RN                         soft Regular call for  follow  up appointment  with primary care  md   and  GI  md    diet  meds  activity as per md   any severe pain nausea vomiting fevers  any  problems   call md  call 911 [facility]  EMERGENCY ROOM

## 2021-03-23 NOTE — PROGRESS NOTES
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 Please see Dr. Duncan's detailed note from 2/28/20 for complete hematologic history. She is currently on Hydrea and Jadenu.     She was admitted 2/1/21-2/3/21 with a new PE, started on Rivaroxaban.      She was seen today for hematology follow-up.     Interval History:  Patient reports that she typically has pain in her low back, legs, and arms.  Today, her pain is primarily in her back.  She has typically been taking her oxycodone 15 mg every 6 hours.  She did restart the OxyContin 10 mg every morning last week.  She is typically taking about 3-4 Tylenol per day, 3-4 ibuprofen per day, and 1 Celebrex per day.  She previously had tried lidocaine patches and did not find them helpful.  She reports that her mood has been down again more lately.  She follows with her psychologist weekly.  She denies any suicidal ideations.  She feels that her mood has been down since her last hospital stay.  She reports concerns that she was not able to have a nebulizer when she was in the ER recently and was feeling wheezy.  She is also concerned that some of the nurses have difficulty accessing her port and wonders if she may need a new port.  She denies other concerns.    Current Outpatient Medications   Medication Sig Dispense Refill     acetaminophen (TYLENOL) 325 MG tablet Take 2 tablets (650 mg) by mouth every 6 hours as needed for mild pain (Patient taking differently: Take 325-650 mg by mouth every 6 hours as needed for mild pain ) 120 tablet 3     albuterol (PROAIR HFA/PROVENTIL HFA/VENTOLIN HFA) 108 (90 Base) MCG/ACT inhaler  Inhale 2 puffs into the lungs every 6 hours as needed for shortness of breath / dyspnea or wheezing 8.5 g 3     albuterol (PROVENTIL) (2.5 MG/3ML) 0.083% neb solution Take 1 vial (2.5 mg) by nebulization every 6 hours as needed for shortness of breath / dyspnea or wheezing 12 mL 4     aspirin (ASPIRIN) 81 MG EC tablet Take 1 tablet (81 mg) by mouth daily . HOLD while on Xarelto 90 tablet 3     budesonide-formoterol (SYMBICORT) 160-4.5 MCG/ACT Inhaler Inhale 2 puffs into the lungs 2 times daily 10.2 g 3     celecoxib (CELEBREX) 100 MG capsule Take 1 capsule (100 mg) by mouth 2 times daily 60 capsule 3     diphenhydrAMINE (BENADRYL) 25 MG capsule Take 1-2 capsules (25-50 mg) by mouth nightly as needed for sleep 60 capsule 3     EPINEPHrine (ANY BX GENERIC EQUIV) 0.3 MG/0.3ML injection 2-pack Inject 0.3 mLs (0.3 mg) into the muscle as needed for anaphylaxis 1 each 1     Hydroxyurea 1000 MG TABS Take 2,000 mg by mouth daily (Patient taking differently: Take 2,000 mg by mouth every evening ) 60 tablet 3     JADENU 360 MG tablet Take 4 tablets (1,440 mg) by mouth every evening 30 tablet 0     medroxyPROGESTERone (DEPO-PROVERA) 150 MG/ML IM injection Inject 150 mg into the muscle       ondansetron (ZOFRAN) 8 MG tablet        oxyCODONE (OXYCONTIN) 10 MG 12 hr tablet Take 1 tablet (10 mg) by mouth every morning 30 tablet 0     oxyCODONE IR (ROXICODONE) 15 MG tablet Take 1 tablet (15 mg) by mouth every 6 hours as needed for severe pain 60 tablet 0     rivaroxaban ANTICOAGULANT (XARELTO) 20 MG TABS tablet Take 1 tablet (20 mg) by mouth daily (with dinner) 63 tablet 0     sertraline (ZOLOFT) 100 MG tablet Take 2 tablets (200 mg) by mouth daily 180 tablet 3     naloxone (NARCAN) 4 MG/0.1ML nasal spray Spray 1 spray (4 mg) into one nostril alternating nostrils once as needed for opioid reversal every 2-3 minutes until assistance arrives (Patient not taking: Reported on 3/23/2021) 0.2 mL 1     Physical Examination:  /85    Pulse 99   Temp 98.5  F (36.9  C) (Oral)   Resp 16   Wt 74.4 kg (164 lb)   SpO2 97%   BMI 28.15 kg/m    Wt Readings from Last 10 Encounters:   03/23/21 74.4 kg (164 lb)   03/17/21 74.8 kg (165 lb)   03/04/21 75 kg (165 lb 4.8 oz)   03/01/21 75 kg (165 lb 5.5 oz)   02/26/21 75 kg (165 lb 6.4 oz)   02/24/21 73.5 kg (162 lb 1.6 oz)   02/19/21 74.3 kg (163 lb 14.4 oz)   02/18/21 73.5 kg (162 lb)   02/17/21 72.1 kg (159 lb)   02/10/21 76.4 kg (168 lb 8 oz)   Constitutional: Well-appearing female in no acute distress.  Eyes: EOMI, PERRL. No scleral icterus.  ENT: Oral mucosa is moist without lesions or thrush.   Lymphatic: Neck is supple without cervical or supraclavicular lymphadenopathy.   Cardiovascular: Regular rate and rhythm. No peripheral edema.  Respiratory: Clear to auscultation bilaterally. No wheezes or crackles.  Neurologic: Cranial nerves II through XII are grossly intact. Sequela of stroke with right side weakness.  Musculoskeletal: Contracture noted of the right hand. Brace on right leg.   Skin: No rashes, petechiae, or bruising noted on exposed skin.    Laboratory Data:   3/21/2021 11:39   Sodium 139   Potassium 3.5   Chloride 110 (H)   Carbon Dioxide 25   Urea Nitrogen 6 (L)   Creatinine 0.51 (L)   GFR Estimate >90   GFR Estimate If Black >90   Calcium 8.6   Anion Gap 4   Glucose 83   WBC 3.4 (L)   Hemoglobin 7.0 (L)   Hematocrit 20.9 (L)   Platelet Count 308   RBC Count 1.97 (L)    (H)   MCH 35.5 (H)   MCHC 33.5   RDW 27.3 (H)   Diff Method Manual Differential   % Neutrophils 66.0   % Lymphocytes 29.5   % Monocytes 0.9   % Eosinophils 0.9   % Basophils 2.7   Nucleated RBCs 13 (H)   Absolute Neutrophil 2.2   Absolute Lymphocytes 1.0   Absolute Monocytes 0.0   Absolute Eosinophils 0.0   Absolute Basophils 0.1   Absolute Nucleated RBC 0.5   Anisocytosis Marked   Poikilocytosis Moderate   Polychromasia Marked   Target Cells Slight   Ovalocytes Slight   Elliptocytes Moderate   Macrocytes Present    [place] Bodies Present   Platelet Estimate Confirming automated cell count   % Retic 10.6 (H)   Absolute Retic 205.8 (H)     Assessment and Plan:  1. Sickle Cell Disease  HgbSS, has been having more issues with pain recently with more ED visits. She received an infusion with IV fluids and pain medication yesterday and will again today. She will call tomorrow if she feels she needs another infusion. No concern for acute chest or other complications from crisis. She will have close follow-up with Dr. Duncan later this week. Will repeat labs at that time. She remains on Hydrea at 2000 mg and Jadenu 4 tablets daily.     Pain Control: She resumed OxyContin 10 mg every morning last week. She is also on oxycodone 15 mg, generally every 6 hours, though sometimes every 4 hours. She takes alternating Tylenol, ibuprofen, and Celebrex. I advised her to make sure she is  her doses of ibuprofen and Celebrex. She has not found a benefit from lidocaine patches in the past. She believes she has not tried Voltaren gel. She will try this on her back, legs, and arms as needed.      2. Neuro  History of stroke, no new concerns. ASA on hold while on anticoagulation for PE. No new neuro concerns.     3. Psych  History of anxiety and depression. Anxiety is under good control, but mood has been worse since early March. She is currently on the maximum dose of 200 mg sertraline. Will add in low dose Abilify at 2 mg daily. May increase in 4 weeks if needed. Discussed it may take up to 4-6 weeks to notice improvement in her symptoms with the addition of Abilify, though some people notice improvement sooner. She will continue to follow with her psychologist weekly.     4. Pulm  History of asthma, continue Symbicort and Albuterol PRN. Will re-request pulm visit. We reviewed that current policy is to not administer nebulizers in our facility due to the risk of aerosolizing COVID. She expressed understanding of this.       Bilateral PE, diagnosed 2/1/21, now on Rivaroxaban 20mg daily. Symptoms have resolved. No bleeding concerns. Will be on Rivaroxaban for 3 months, end the beginning of May.     5. IV access   Reviewed port access concerns with Dr. Duncan since patient reports some difficulty with access recently. As issues are not consistent, will plan to keep this port for now.     Patricia Hodge PA-C  [facility] [address] [id number] [phone]

## 2021-03-23 NOTE — PROGRESS NOTES
This is a recent snapshot of the patient's [facility] medical record.  For current drug dose and complete information and questions, call [phone] or In Basket pool, [facility] home infusion ([id number])  CSN Number:  [id number]

## 2021-03-23 NOTE — NURSING NOTE
"Oncology Rooming Note    March 23, 2021 10:17 AM   Jennifer Cervantes is a 21 year old female who presents for:    Chief Complaint   Patient presents with     Oncology Clinic Visit     Return; Sickle Cell     Initial Vitals: /85   Pulse 99   Temp 98.5  F (36.9  C) (Oral)   Resp 16   Wt 74.4 kg (164 lb)   SpO2 97%   BMI 28.15 kg/m   Estimated body mass index is 28.15 kg/m  as calculated from the following:    Height as of 3/21/21: 1.626 m (5' 4\").    Weight as of this encounter: 74.4 kg (164 lb). Body surface area is 1.83 meters squared.  Extreme Pain (9) Comment: Data Unavailable   No LMP recorded. Patient has had an injection.  Allergies reviewed: Yes  Medications reviewed: Yes    Medications: Medication refills not needed today.  Pharmacy name entered into EPIC:    [facility] - [place], [place] - [address] [id number] [address] [facility] [address] SEC OF [facility] [place], [place] - [address] [facility] [id number]  [facility][id number] [facility] [address] AT [facility] #[id number] [facility] [address] [facility] [address] OF [address] [facility] - [place], [place] - [address] [facility]    Clinical concerns: Patient states there are no new concerns to discuss with provider.  Patricia was not notified.         [place] CMA              "

## 2021-03-23 NOTE — LETTER
3/23/2021         RE: Jennifer NEWMAN Billy  [address]  [facility] [address]      Oncology/Hematology Visit Note  Mar 23, 2021    Reason for Visit: Follow up of sickle cell hgbSS disease     History of Present Illness: HgbSS complicated by frequent pain crises (acute and chronic components), history of stroke leading to significant cognitive delays and right upper extremity hemiparesis, iron overload 2/2 chronic transfusions as secondary ppx post-CVA, anxiety/depression, asthma. Please see Dr. Duncan's detailed note from 2/28/20 for complete hematologic history. She is currently on Hydrea and Jadenu.     She was admitted 2/1/21-2/3/21 with a new PE, started on Rivaroxaban.      She was seen today for hematology follow-up.     Interval History:  Patient reports that she typically has pain in her low back, legs, and arms.  Today, her pain is primarily in her back.  She has typically been taking her oxycodone 15 mg every 6 hours.  She did restart the OxyContin 10 mg every morning last week.  She is typically taking about 3-4 Tylenol per day, 3-4 ibuprofen per day, and 1 Celebrex per day.  She previously had tried lidocaine patches and did not find them helpful.  She reports that her mood has been down again more lately.  She follows with her psychologist weekly.  She denies any suicidal ideations.  She feels that her mood has been down since her last hospital stay.  She reports concerns that she was not able to have a nebulizer when she was in the ER recently and was feeling wheezy.  She is also concerned that some of the nurses have difficulty accessing her port and wonders if she may need a new port.  She denies other concerns.    Current Outpatient Medications   Medication Sig Dispense Refill     acetaminophen (TYLENOL) 325 MG tablet Take 2 tablets (650 mg) by mouth every 6 hours as needed for mild pain (Patient taking differently: Take 325-650 mg by mouth every 6 hours as needed for mild pain ) 120 tablet 3      albuterol (PROAIR HFA/PROVENTIL HFA/VENTOLIN HFA) 108 (90 Base) MCG/ACT inhaler Inhale 2 puffs into the lungs every 6 hours as needed for shortness of breath / dyspnea or wheezing 8.5 g 3     albuterol (PROVENTIL) (2.5 MG/3ML) 0.083% neb solution Take 1 vial (2.5 mg) by nebulization every 6 hours as needed for shortness of breath / dyspnea or wheezing 12 mL 4     aspirin (ASPIRIN) 81 MG EC tablet Take 1 tablet (81 mg) by mouth daily . HOLD while on Xarelto 90 tablet 3     budesonide-formoterol (SYMBICORT) 160-4.5 MCG/ACT Inhaler Inhale 2 puffs into the lungs 2 times daily 10.2 g 3     celecoxib (CELEBREX) 100 MG capsule Take 1 capsule (100 mg) by mouth 2 times daily 60 capsule 3     diphenhydrAMINE (BENADRYL) 25 MG capsule Take 1-2 capsules (25-50 mg) by mouth nightly as needed for sleep 60 capsule 3     EPINEPHrine (ANY BX GENERIC EQUIV) 0.3 MG/0.3ML injection 2-pack Inject 0.3 mLs (0.3 mg) into the muscle as needed for anaphylaxis 1 each 1     Hydroxyurea 1000 MG TABS Take 2,000 mg by mouth daily (Patient taking differently: Take 2,000 mg by mouth every evening ) 60 tablet 3     JADENU 360 MG tablet Take 4 tablets (1,440 mg) by mouth every evening 30 tablet 0     medroxyPROGESTERone (DEPO-PROVERA) 150 MG/ML IM injection Inject 150 mg into the muscle       ondansetron (ZOFRAN) 8 MG tablet        oxyCODONE (OXYCONTIN) 10 MG 12 hr tablet Take 1 tablet (10 mg) by mouth every morning 30 tablet 0     oxyCODONE IR (ROXICODONE) 15 MG tablet Take 1 tablet (15 mg) by mouth every 6 hours as needed for severe pain 60 tablet 0     rivaroxaban ANTICOAGULANT (XARELTO) 20 MG TABS tablet Take 1 tablet (20 mg) by mouth daily (with dinner) 63 tablet 0     sertraline (ZOLOFT) 100 MG tablet Take 2 tablets (200 mg) by mouth daily 180 tablet 3     naloxone (NARCAN) 4 MG/0.1ML nasal spray Spray 1 spray (4 mg) into one nostril alternating nostrils once as needed for opioid reversal every 2-3 minutes until assistance arrives (Patient  not taking: Reported on 3/23/2021) 0.2 mL 1     Physical Examination:  /85   Pulse 99   Temp 98.5  F (36.9  C) (Oral)   Resp 16   Wt 74.4 kg (164 lb)   SpO2 97%   BMI 28.15 kg/m    Wt Readings from Last 10 Encounters:   03/23/21 74.4 kg (164 lb)   03/17/21 74.8 kg (165 lb)   03/04/21 75 kg (165 lb 4.8 oz)   03/01/21 75 kg (165 lb 5.5 oz)   02/26/21 75 kg (165 lb 6.4 oz)   02/24/21 73.5 kg (162 lb 1.6 oz)   02/19/21 74.3 kg (163 lb 14.4 oz)   02/18/21 73.5 kg (162 lb)   02/17/21 72.1 kg (159 lb)   02/10/21 76.4 kg (168 lb 8 oz)   Constitutional: Well-appearing female in no acute distress.  Eyes: EOMI, PERRL. No scleral icterus.  ENT: Oral mucosa is moist without lesions or thrush.   Lymphatic: Neck is supple without cervical or supraclavicular lymphadenopathy.   Cardiovascular: Regular rate and rhythm. No peripheral edema.  Respiratory: Clear to auscultation bilaterally. No wheezes or crackles.  Neurologic: Cranial nerves II through XII are grossly intact. Sequela of stroke with right side weakness.  Musculoskeletal: Contracture noted of the right hand. Brace on right leg.   Skin: No rashes, petechiae, or bruising noted on exposed skin.    Laboratory Data:   3/21/2021 11:39   Sodium 139   Potassium 3.5   Chloride 110 (H)   Carbon Dioxide 25   Urea Nitrogen 6 (L)   Creatinine 0.51 (L)   GFR Estimate >90   GFR Estimate If Black >90   Calcium 8.6   Anion Gap 4   Glucose 83   WBC 3.4 (L)   Hemoglobin 7.0 (L)   Hematocrit 20.9 (L)   Platelet Count 308   RBC Count 1.97 (L)    (H)   MCH 35.5 (H)   MCHC 33.5   RDW 27.3 (H)   Diff Method Manual Differential   % Neutrophils 66.0   % Lymphocytes 29.5   % Monocytes 0.9   % Eosinophils 0.9   % Basophils 2.7   Nucleated RBCs 13 (H)   Absolute Neutrophil 2.2   Absolute Lymphocytes 1.0   Absolute Monocytes 0.0   Absolute Eosinophils 0.0   Absolute Basophils 0.1   Absolute Nucleated RBC 0.5   Anisocytosis Marked   Poikilocytosis Moderate   Polychromasia Marked    Target Cells Slight   Ovalocytes Slight   Elliptocytes Moderate   Macrocytes Present   [place] Bodies Present   Platelet Estimate Confirming automated cell count   % Retic 10.6 (H)   Absolute Retic 205.8 (H)     Assessment and Plan:  1. Sickle Cell Disease  HgbSS, has been having more issues with pain recently with more ED visits. She received an infusion with IV fluids and pain medication yesterday and will again today. She will call tomorrow if she feels she needs another infusion. No concern for acute chest or other complications from crisis. She will have close follow-up with Dr. Duncan later this week. Will repeat labs at that time. She remains on Hydrea at 2000 mg and Jadenu 4 tablets daily.     Pain Control: She resumed OxyContin 10 mg every morning last week. She is also on oxycodone 15 mg, generally every 6 hours, though sometimes every 4 hours. She takes alternating Tylenol, ibuprofen, and Celebrex. I advised her to make sure she is  her doses of ibuprofen and Celebrex. She has not found a benefit from lidocaine patches in the past. She believes she has not tried Voltaren gel. She will try this on her back, legs, and arms as needed.      2. Neuro  History of stroke, no new concerns. ASA on hold while on anticoagulation for PE. No new neuro concerns.     3. Psych  History of anxiety and depression. Anxiety is under good control, but mood has been worse since early March. She is currently on the maximum dose of 200 mg sertraline. Will add in low dose Abilify at 2 mg daily. May increase in 4 weeks if needed. Discussed it may take up to 4-6 weeks to notice improvement in her symptoms with the addition of Abilify, though some people notice improvement sooner. She will continue to follow with her psychologist weekly.     4. Pulm  History of asthma, continue Symbicort and Albuterol PRN. Will re-request pulm visit. We reviewed that current policy is to not administer nebulizers in our facility  due to the risk of aerosolizing COVID. She expressed understanding of this.      Bilateral PE, diagnosed 2/1/21, now on Rivaroxaban 20mg daily. Symptoms have resolved. No bleeding concerns. Will be on Rivaroxaban for 3 months, end the beginning of May.     5. IV access   Reviewed port access concerns with Dr. Duncan since patient reports some difficulty with access recently. As issues are not consistent, will plan to keep this port for now.     Patricia Hodge PA-C  [facility]  [address] [place] [id number] [phone]          Patricia Hodge PA-C

## 2021-03-23 NOTE — PATIENT INSTRUCTIONS
[facility] Triage and after hours / weekends / holidays:  [phone]    Please call the triage or after hours line if you experience a temperature greater than or equal to 100.5, shaking chills, have uncontrolled nausea, vomiting and/or diarrhea, dizziness, shortness of breath, chest pain, bleeding, unexplained bruising, or if you have any other new/concerning symptoms, questions or concerns.      If you are having any concerning symptoms or wish to speak to a provider before your next infusion visit, please call your care coordinator or triage to notify them so we can adequately serve you.     If you need a refill on a narcotic prescription or other medication, please call before your infusion appointment.                 March 2021 Sunday Monday Tuesday Wednesday Thursday Friday Saturday        1    Admission   8:11 PM   Raul Diaz DO   [facility] Emergency Department   (Discharge: 3/2/2021) 2     3     4  Happy Birthday!    Admission   2:19 PM   Alhaji Winters MD   [facility] [address] [place]   (Discharge: 3/16/2021) 5     6    XR CHEST PORT 1 VIEW   8:40 AM   (20 min.)   [id number]   [facility]   7     8    XR CHEST 2 VIEWS   8:05 AM   (15 min.)   [id number]   [facility] 9     10    XR CHEST 2 VIEWS  11:40 AM   (15 min.)   UUXR1   [facility] 11     12     13       14     15     16     17    Admission   8:49 PM   Huang Domingo MD   [facility] Department   (Discharge: 3/18/2021)    US LWR EXT VENOUS DUPLEX BILAT  10:50 PM   (40 min.)   UUUS1   [facility] 18    UMP ONC INFUSION 120   3:00 PM   (120 min.)   UC ONCOLOGY INFUSION   [facility] 19    Admission  12:07 PM   Ifeanyi Gaitan MD   [facility]   (Discharge: 3/19/2021) 20    Admission  11:20 AM   [facility]    (Discharge: 3/20/2021)   21    Admission  11:16 AM   [facility]   (Discharge: 3/21/2021) 22    [facility] ONC INFUSION 120   1:00 PM   (120 min.)   UC ONCOLOGY INFUSION   [facility] 23    RETURN  10:05 AM   (50 min.)   Patricia Hodge PA-C   [facility] ONC INFUSION 120  11:30 AM   (120 min.)   UC ONCOLOGY INFUSION   [facility] 24     25    MR MYOCARDIUM WO   9:30 AM   (105 min.)   [id number]   [facility]    MR ABDOMEN WO  11:00 AM   (45 min.)   UUMR1   [facility] 26    LAB CENTRAL  12:30 PM   (15 min.)   [facility] LAB DRAW   [facility]    UM RETURN  12:45 PM   (30 min.)   Eric Duncan MD   [facility] ONC INFUSION 120   1:30 PM   (120 min.)   UC ONCOLOGY INFUSION   [facility] 27       28     29     30     31 April 2021 Sunday Monday Tuesday Wednesday Thursday Friday Saturday                       1     2     3       4     5     6    [facility] RETURN  10:15 AM   (30 min.)   Suraj Case MD   [facility] [place] 7     8     9     10       11     12     13     14     15     16     17       18     19     20     21     22     23     24       25     26     27     28     29     30                          Lab Results:  No results found for this or any previous visit (from the past 12 hour(s)).

## 2021-03-24 ENCOUNTER — TELEPHONE (OUTPATIENT)
Dept: ONCOLOGY | Facility: CLINIC | Age: 21
End: 2021-03-24

## 2021-03-24 ENCOUNTER — INFUSION THERAPY VISIT (OUTPATIENT)
Dept: ONCOLOGY | Facility: CLINIC | Age: 21
End: 2021-03-24
Attending: PEDIATRICS
Payer: COMMERCIAL

## 2021-03-24 ENCOUNTER — PATIENT OUTREACH (OUTPATIENT)
Dept: CARE COORDINATION | Facility: CLINIC | Age: 21
End: 2021-03-24

## 2021-03-24 VITALS
DIASTOLIC BLOOD PRESSURE: 84 MMHG | HEART RATE: 98 BPM | OXYGEN SATURATION: 97 % | TEMPERATURE: 98.3 F | RESPIRATION RATE: 16 BRPM | SYSTOLIC BLOOD PRESSURE: 125 MMHG

## 2021-03-24 DIAGNOSIS — D57.00 SICKLE CELL PAIN CRISIS (H): Primary | ICD-10-CM

## 2021-03-24 PROCEDURE — 96376 TX/PRO/DX INJ SAME DRUG ADON: CPT

## 2021-03-24 PROCEDURE — 258N000003 HC RX IP 258 OP 636: Performed by: PEDIATRICS

## 2021-03-24 PROCEDURE — 96361 HYDRATE IV INFUSION ADD-ON: CPT

## 2021-03-24 PROCEDURE — 96374 THER/PROPH/DIAG INJ IV PUSH: CPT

## 2021-03-24 PROCEDURE — 250N000011 HC RX IP 250 OP 636: Performed by: PEDIATRICS

## 2021-03-24 RX ORDER — ONDANSETRON 8 MG/1
8 TABLET, FILM COATED ORAL
Status: CANCELLED
Start: 2021-03-24

## 2021-03-24 RX ORDER — HEPARIN SODIUM (PORCINE) LOCK FLUSH IV SOLN 100 UNIT/ML 100 UNIT/ML
5 SOLUTION INTRAVENOUS
Status: DISCONTINUED | OUTPATIENT
Start: 2021-03-24 | End: 2021-03-24 | Stop reason: HOSPADM

## 2021-03-24 RX ORDER — MORPHINE SULFATE 2 MG/ML
2 INJECTION, SOLUTION INTRAMUSCULAR; INTRAVENOUS
Status: COMPLETED | OUTPATIENT
Start: 2021-03-24 | End: 2021-03-24

## 2021-03-24 RX ORDER — HEPARIN SODIUM (PORCINE) LOCK FLUSH IV SOLN 100 UNIT/ML 100 UNIT/ML
5 SOLUTION INTRAVENOUS
Status: CANCELLED | OUTPATIENT
Start: 2021-03-24

## 2021-03-24 RX ORDER — HEPARIN SODIUM,PORCINE 10 UNIT/ML
5 VIAL (ML) INTRAVENOUS
Status: CANCELLED | OUTPATIENT
Start: 2021-03-24

## 2021-03-24 RX ORDER — MORPHINE SULFATE 2 MG/ML
2 INJECTION, SOLUTION INTRAMUSCULAR; INTRAVENOUS
Status: CANCELLED
Start: 2021-03-24

## 2021-03-24 RX ORDER — DIPHENHYDRAMINE HCL 25 MG
25 CAPSULE ORAL
Status: CANCELLED
Start: 2021-03-24

## 2021-03-24 RX ADMIN — DEXTROSE AND SODIUM CHLORIDE 500 ML: 5; 450 INJECTION, SOLUTION INTRAVENOUS at 10:20

## 2021-03-24 RX ADMIN — MORPHINE SULFATE 2 MG: 2 INJECTION, SOLUTION INTRAMUSCULAR; INTRAVENOUS at 10:36

## 2021-03-24 RX ADMIN — MORPHINE SULFATE 2 MG: 2 INJECTION, SOLUTION INTRAMUSCULAR; INTRAVENOUS at 12:47

## 2021-03-24 RX ADMIN — MORPHINE SULFATE 2 MG: 2 INJECTION, SOLUTION INTRAMUSCULAR; INTRAVENOUS at 11:44

## 2021-03-24 RX ADMIN — Medication 5 ML: at 13:07

## 2021-03-24 NOTE — PROGRESS NOTES
Infusion Nursing Note:  Jennifer Cervantes presents today for IVF's, pain meds.    Patient seen by provider today: No   present during visit today: Not Applicable.    Note: Patient arrives to infusion with 9/10 low back and bilateral leg discomfort. PRN pain meds taken this morning with minimal relief. Patient denies acute complaints or concerns not addressed during visit with Patricia HIGGINS yesterday 3/23/2021. Specifically, patient denies s/s of infection such as fever, sore throat, cough, chest pain, shortness of breath, or changes in taste/smell.    Patient did meet criteria for an asymptomatic covid-19 PCR test in infusion today. Patient declined the covid-19 test.    Intravenous Access:  Implanted Port.    Treatment Conditions:  Not Applicable.      Post Infusion Assessment:  Patient tolerated infusion without incident.  Post 3 doses of IV morphine and 500mls of fluids, patient rated pain a 6/10 and stated she was comfortable going home. Patient verbalized understanding to seek medical care via ER if pain increased at home.   Blood return noted pre and post infusion.  Site patent and intact, free from redness, edema or discomfort.  No evidence of extravasations.  Access discontinued per protocol.       Discharge Plan:   Patient declined prescription refills.  Discharge instructions reviewed with: Patient.  Patient and/or family verbalized understanding of discharge instructions and all questions answered.  AVS to patient via [employer].  Patient will return 3/26 for next appointment.   Patient discharged in stable condition accompanied by: self.  Departure Mode: Ambulatory.  Face to Face time: 0 minutes.    Rik Ayers RN

## 2021-03-24 NOTE — PROGRESS NOTES
Social Work Follow-Up   [facility]    Data/Intervention:  Patient Name:  Jennifer Cervantes  /Age:  2000 (21 year old)    Reason for Follow-Up:  Transportation     Collaborated With:    -Elroy Rodriguez RN  -[employer]  -Pt    Intervention/Education/Resources Provided:  SW received page about pt needing ride set up for their 10:30 am appointment for today. SW called [employer] and ride is arranged through [employer]. They will  pt around 9:45 am and it will be a will call return ride. SW called pt and informed them of their ride for today. SW called Elroy Rodriguez RN and updated them as well.    Assessment/Plan:  SW will remain available as needed.  Previously provided patient/family with writer's contact information and availability.       CARLOS Chavez,JASPREETSW  Hematology/Oncology Social Worker  Phone:[phone] Pager: [phone]

## 2021-03-24 NOTE — PATIENT INSTRUCTIONS
[facility] Triage and after hours / weekends / holidays:  [phone]    Please call the triage or after hours line if you experience a temperature greater than or equal to 100.5, shaking chills, have uncontrolled nausea, vomiting and/or diarrhea, dizziness, shortness of breath, chest pain, bleeding, unexplained bruising, or if you have any other new/concerning symptoms, questions or concerns.      If you are having any concerning symptoms or wish to speak to a provider before your next infusion visit, please call your care coordinator or triage to notify them so we can adequately serve you.     If you need a refill on a narcotic prescription or other medication, please call before your infusion appointment.                 March 2021 Sunday Monday Tuesday Wednesday Thursday Friday Saturday        1    Admission   8:11 PM   Raul Diaz DO   [facility]   (Discharge: 3/2/2021) 2     3     4  Happy Birthday!    Admission   2:19 PM   Alhaji Winters MD   [facility] [address] [place]   (Discharge: 3/16/2021) 5     6    XR CHEST PORT 1 VIEW   8:40 AM   (20 min.)   [id number]   [facility]   7     8    XR CHEST 2 VIEWS   8:05 AM   (15 min.)   UUXR1   [facility] 9     10    XR CHEST 2 VIEWS  11:40 AM   (15 min.)   UUXR1   [facility] 11     12     13       14     15     16     17    Admission   8:49 PM   Huang Domingo MD   [facility]   (Discharge: 3/18/2021)    US LWR EXT VENOUS DUPLEX BILAT  10:50 PM   (40 min.)   UUUS1   [facility] 18    UMP ONC INFUSION 120   3:00 PM   (120 min.)   UC ONCOLOGY INFUSION   [facility] 19    Admission  12:07 PM   Ifeanyi Gaitan MD   [facility]   (Discharge: 3/19/2021) 20    Admission  11:20 AM   Pedro Ryan MD   [facility]  Emergency Department   (Discharge: 3/20/2021)   21    Admission  11:16 AM   [facility]   (Discharge: 3/21/2021) 22    UM ONC INFUSION 120   1:00 PM   (120 min.)   UC ONCOLOGY INFUSION   [facility] 23    RETURN  10:05 AM   (50 min.)   Patricia Hodge PA-C   [facility] ONC INFUSION 120  11:30 AM   (120 min.)   UC ONCOLOGY INFUSION   [facility] 24    UMP ONC INFUSION 120  10:30 AM   (120 min.)   UC ONCOLOGY INFUSION   [facility] 25    MR MYOCARDIUM WO   9:30 AM   (105 min.)   UUMR4   [facility]    MR ABDOMEN WO  11:00 AM   (45 min.)   UUMR1   [facility] 26    LAB CENTRAL  12:30 PM   (15 min.)   [facility]RAW   [facility] RETURN  12:45 PM   (30 min.)   Eric Duncan MD   [facility] ONC INFUSION 120   1:30 PM   (120 min.)   UC ONCOLOGY INFUSION   [facility] 27       28     29     30     31 April 2021 Sunday Monday Tuesday Wednesday Thursday Friday Saturday                       1     2     3       4     5     6    [facility] RETURN  10:15 AM   (30 min.)   Suraj Case MD   [facility] [place] 7     8     9     10       11     12     13     14     15     16     17       18     19     20     21     22     23     24       25     26     27     28     29     30                          Lab Results:  No results found for this or any previous visit (from the past 12 hour(s)).

## 2021-03-24 NOTE — TELEPHONE ENCOUNTER
[facility] Telephone Triage Note    The following symptoms were reported:   Typical  Description:            Onset:  Last night   Location: Lower back and legs  Character: Stabbing/Sharp           Intensity: 9/10    Accompanying Signs & Symptoms:  none    Chest Pain:  denies     Shortness of Breath:  denies     Fever:  denies     Chills:  denies   Cough/sore throat:  denies  Other:  denies    Therapies Tried and outcome: Oxycodone, oxycontin, tylenol about 1 hour ago.    Improved by: nothing    The following provider was consulted:  7:07am paged Andrei HIGGINS     Will need transportation [id number] minutes.    The following advice/orders were given, and/or interventions recommended:    Approved for IVF/Pain, opening at 10:30am with [facility].     Patient instructions and/or follow up:    Called and relayed information to Pt, who verbalized understanding and will need transportation.    Called and paged  Abdullahi Elliott to assist with arranging transportation.     Schedulers notified

## 2021-03-25 ENCOUNTER — HOSPITAL ENCOUNTER (OUTPATIENT)
Dept: MRI IMAGING | Facility: CLINIC | Age: 21
End: 2021-03-25
Attending: PHYSICIAN ASSISTANT
Payer: COMMERCIAL

## 2021-03-25 ENCOUNTER — TELEPHONE (OUTPATIENT)
Dept: ONCOLOGY | Facility: CLINIC | Age: 21
End: 2021-03-25

## 2021-03-25 ENCOUNTER — INFUSION THERAPY VISIT (OUTPATIENT)
Dept: ONCOLOGY | Facility: CLINIC | Age: 21
End: 2021-03-25
Attending: PEDIATRICS
Payer: COMMERCIAL

## 2021-03-25 ENCOUNTER — ONCOLOGY VISIT (OUTPATIENT)
Dept: ONCOLOGY | Facility: CLINIC | Age: 21
End: 2021-03-25
Attending: PHYSICIAN ASSISTANT
Payer: COMMERCIAL

## 2021-03-25 ENCOUNTER — CARE COORDINATION (OUTPATIENT)
Dept: ONCOLOGY | Facility: CLINIC | Age: 21
End: 2021-03-25

## 2021-03-25 ENCOUNTER — HOSPITAL ENCOUNTER (OUTPATIENT)
Dept: ULTRASOUND IMAGING | Facility: CLINIC | Age: 21
End: 2021-03-25
Attending: PHYSICIAN ASSISTANT
Payer: COMMERCIAL

## 2021-03-25 VITALS
TEMPERATURE: 98.2 F | RESPIRATION RATE: 16 BRPM | SYSTOLIC BLOOD PRESSURE: 126 MMHG | OXYGEN SATURATION: 97 % | WEIGHT: 162.3 LBS | BODY MASS INDEX: 27.86 KG/M2 | HEART RATE: 110 BPM | DIASTOLIC BLOOD PRESSURE: 86 MMHG

## 2021-03-25 DIAGNOSIS — D57.00 SICKLE CELL CRISIS (H): ICD-10-CM

## 2021-03-25 DIAGNOSIS — Z86.73 HISTORY OF STROKE: ICD-10-CM

## 2021-03-25 DIAGNOSIS — D57.1 HB-SS DISEASE WITHOUT CRISIS (H): ICD-10-CM

## 2021-03-25 DIAGNOSIS — D57.00 SICKLE CELL PAIN CRISIS (H): ICD-10-CM

## 2021-03-25 DIAGNOSIS — E83.111 IRON OVERLOAD DUE TO REPEATED RED BLOOD CELL TRANSFUSIONS: ICD-10-CM

## 2021-03-25 DIAGNOSIS — D57.00 SICKLE CELL PAIN CRISIS (H): Primary | ICD-10-CM

## 2021-03-25 DIAGNOSIS — I82.621 ACUTE DEEP VEIN THROMBOSIS (DVT) OF RIGHT UPPER EXTREMITY, UNSPECIFIED VEIN (H): Primary | ICD-10-CM

## 2021-03-25 DIAGNOSIS — Z86.73 HISTORY OF STROKE: Primary | ICD-10-CM

## 2021-03-25 LAB
ALBUMIN SERPL-MCNC: 4.1 G/DL (ref 3.4–5)
ALP SERPL-CCNC: 72 U/L (ref 40–150)
ALT SERPL W P-5'-P-CCNC: 111 U/L (ref 0–50)
ANION GAP SERPL CALCULATED.3IONS-SCNC: 4 MMOL/L (ref 3–14)
AST SERPL W P-5'-P-CCNC: 95 U/L (ref 0–45)
BASOPHILS # BLD AUTO: 0.1 10E9/L (ref 0–0.2)
BASOPHILS NFR BLD AUTO: 2.1 %
BILIRUB SERPL-MCNC: 2 MG/DL (ref 0.2–1.3)
BUN SERPL-MCNC: 7 MG/DL (ref 7–30)
CALCIUM SERPL-MCNC: 9 MG/DL (ref 8.5–10.1)
CHLORIDE SERPL-SCNC: 112 MMOL/L (ref 94–109)
CO2 SERPL-SCNC: 22 MMOL/L (ref 20–32)
CREAT SERPL-MCNC: 0.47 MG/DL (ref 0.52–1.04)
DIFFERENTIAL METHOD BLD: ABNORMAL
EOSINOPHIL # BLD AUTO: 0.4 10E9/L (ref 0–0.7)
EOSINOPHIL NFR BLD AUTO: 5.8 %
ERYTHROCYTE [DISTWIDTH] IN BLOOD BY AUTOMATED COUNT: 23.7 % (ref 10–15)
GFR SERPL CREATININE-BSD FRML MDRD: >90 ML/MIN/{1.73_M2}
GLUCOSE SERPL-MCNC: 82 MG/DL (ref 70–99)
HCT VFR BLD AUTO: 21.9 % (ref 35–47)
HGB BLD-MCNC: 7.4 G/DL (ref 11.7–15.7)
IMM GRANULOCYTES # BLD: 0 10E9/L (ref 0–0.4)
IMM GRANULOCYTES NFR BLD: 0.2 %
LYMPHOCYTES # BLD AUTO: 2.3 10E9/L (ref 0.8–5.3)
LYMPHOCYTES NFR BLD AUTO: 37.9 %
MCH RBC QN AUTO: 33.5 PG (ref 26.5–33)
MCHC RBC AUTO-ENTMCNC: 33.8 G/DL (ref 31.5–36.5)
MCV RBC AUTO: 99 FL (ref 78–100)
MONOCYTES # BLD AUTO: 0.5 10E9/L (ref 0–1.3)
MONOCYTES NFR BLD AUTO: 7.9 %
NEUTROPHILS # BLD AUTO: 2.9 10E9/L (ref 1.6–8.3)
NEUTROPHILS NFR BLD AUTO: 46.1 %
NRBC # BLD AUTO: 0.2 10*3/UL
NRBC BLD AUTO-RTO: 4 /100
PLATELET # BLD AUTO: 282 10E9/L (ref 150–450)
POTASSIUM SERPL-SCNC: 3.6 MMOL/L (ref 3.4–5.3)
PROT SERPL-MCNC: 8.4 G/DL (ref 6.8–8.8)
RADIOLOGIST FLAGS: ABNORMAL
RBC # BLD AUTO: 2.21 10E12/L (ref 3.8–5.2)
RETICS # AUTO: 251.9 10E9/L (ref 25–95)
RETICS/RBC NFR AUTO: 11.4 % (ref 0.5–2)
SODIUM SERPL-SCNC: 138 MMOL/L (ref 133–144)
WBC # BLD AUTO: 6.2 10E9/L (ref 4–11)

## 2021-03-25 PROCEDURE — 93971 EXTREMITY STUDY: CPT | Mod: RT

## 2021-03-25 PROCEDURE — 85045 AUTOMATED RETICULOCYTE COUNT: CPT | Performed by: PHYSICIAN ASSISTANT

## 2021-03-25 PROCEDURE — 258N000003 HC RX IP 258 OP 636: Performed by: PEDIATRICS

## 2021-03-25 PROCEDURE — 250N000013 HC RX MED GY IP 250 OP 250 PS 637: Performed by: PEDIATRICS

## 2021-03-25 PROCEDURE — 75557 CARDIAC MRI FOR MORPH: CPT | Mod: 26 | Performed by: INTERNAL MEDICINE

## 2021-03-25 PROCEDURE — 96374 THER/PROPH/DIAG INJ IV PUSH: CPT

## 2021-03-25 PROCEDURE — 74181 MRI ABDOMEN W/O CONTRAST: CPT | Mod: 26 | Performed by: RADIOLOGY

## 2021-03-25 PROCEDURE — 93971 EXTREMITY STUDY: CPT | Mod: 26 | Performed by: RADIOLOGY

## 2021-03-25 PROCEDURE — 85025 COMPLETE CBC W/AUTO DIFF WBC: CPT | Performed by: PHYSICIAN ASSISTANT

## 2021-03-25 PROCEDURE — 75557 CARDIAC MRI FOR MORPH: CPT

## 2021-03-25 PROCEDURE — 250N000011 HC RX IP 250 OP 636: Performed by: PHYSICIAN ASSISTANT

## 2021-03-25 PROCEDURE — 250N000011 HC RX IP 250 OP 636: Performed by: PEDIATRICS

## 2021-03-25 PROCEDURE — 80053 COMPREHEN METABOLIC PANEL: CPT | Performed by: PHYSICIAN ASSISTANT

## 2021-03-25 PROCEDURE — 96376 TX/PRO/DX INJ SAME DRUG ADON: CPT

## 2021-03-25 PROCEDURE — 74181 MRI ABDOMEN W/O CONTRAST: CPT

## 2021-03-25 PROCEDURE — 96361 HYDRATE IV INFUSION ADD-ON: CPT

## 2021-03-25 PROCEDURE — 250N000013 HC RX MED GY IP 250 OP 250 PS 637: Performed by: PHYSICIAN ASSISTANT

## 2021-03-25 PROCEDURE — 99214 OFFICE O/P EST MOD 30 MIN: CPT | Performed by: PHYSICIAN ASSISTANT

## 2021-03-25 RX ORDER — APIXABAN 5 MG (74)
KIT ORAL
Qty: 74 EACH | Refills: 0 | Status: SHIPPED | OUTPATIENT
Start: 2021-03-25 | End: 2021-04-24

## 2021-03-25 RX ORDER — MORPHINE SULFATE 2 MG/ML
2 INJECTION, SOLUTION INTRAMUSCULAR; INTRAVENOUS
Status: DISCONTINUED | OUTPATIENT
Start: 2021-03-25 | End: 2021-03-25 | Stop reason: HOSPADM

## 2021-03-25 RX ORDER — HEPARIN SODIUM (PORCINE) LOCK FLUSH IV SOLN 100 UNIT/ML 100 UNIT/ML
5 SOLUTION INTRAVENOUS
Status: CANCELLED | OUTPATIENT
Start: 2021-03-25

## 2021-03-25 RX ORDER — HEPARIN SODIUM (PORCINE) LOCK FLUSH IV SOLN 100 UNIT/ML 100 UNIT/ML
5 SOLUTION INTRAVENOUS
Status: DISCONTINUED | OUTPATIENT
Start: 2021-03-25 | End: 2021-03-25 | Stop reason: HOSPADM

## 2021-03-25 RX ORDER — HEPARIN SODIUM (PORCINE) LOCK FLUSH IV SOLN 100 UNIT/ML 100 UNIT/ML
5 SOLUTION INTRAVENOUS ONCE
Status: COMPLETED | OUTPATIENT
Start: 2021-03-25 | End: 2021-03-25

## 2021-03-25 RX ORDER — DIPHENHYDRAMINE HCL 25 MG
25 CAPSULE ORAL
Status: COMPLETED | OUTPATIENT
Start: 2021-03-25 | End: 2021-03-25

## 2021-03-25 RX ORDER — DIPHENHYDRAMINE HCL 25 MG
25 CAPSULE ORAL
Status: CANCELLED
Start: 2021-03-25

## 2021-03-25 RX ORDER — ACETAMINOPHEN 325 MG/1
650 TABLET ORAL ONCE
Status: COMPLETED | OUTPATIENT
Start: 2021-03-25 | End: 2021-03-25

## 2021-03-25 RX ORDER — MORPHINE SULFATE 2 MG/ML
2 INJECTION, SOLUTION INTRAMUSCULAR; INTRAVENOUS
Status: CANCELLED
Start: 2021-03-25

## 2021-03-25 RX ORDER — ONDANSETRON 8 MG/1
8 TABLET, FILM COATED ORAL
Status: CANCELLED
Start: 2021-03-25

## 2021-03-25 RX ORDER — HEPARIN SODIUM,PORCINE 10 UNIT/ML
5 VIAL (ML) INTRAVENOUS
Status: DISCONTINUED | OUTPATIENT
Start: 2021-03-25 | End: 2021-03-25 | Stop reason: HOSPADM

## 2021-03-25 RX ORDER — HEPARIN SODIUM,PORCINE 10 UNIT/ML
5 VIAL (ML) INTRAVENOUS
Status: CANCELLED | OUTPATIENT
Start: 2021-03-25

## 2021-03-25 RX ORDER — ONDANSETRON 8 MG/1
8 TABLET, ORALLY DISINTEGRATING ORAL
Status: COMPLETED | OUTPATIENT
Start: 2021-03-25 | End: 2021-03-25

## 2021-03-25 RX ADMIN — Medication 5 ML: at 12:34

## 2021-03-25 RX ADMIN — ACETAMINOPHEN 650 MG: 325 TABLET ORAL at 13:23

## 2021-03-25 RX ADMIN — MORPHINE SULFATE 2 MG: 2 INJECTION, SOLUTION INTRAMUSCULAR; INTRAVENOUS at 15:01

## 2021-03-25 RX ADMIN — MORPHINE SULFATE 2 MG: 2 INJECTION, SOLUTION INTRAMUSCULAR; INTRAVENOUS at 13:16

## 2021-03-25 RX ADMIN — DIPHENHYDRAMINE HYDROCHLORIDE 25 MG: 25 CAPSULE ORAL at 14:15

## 2021-03-25 RX ADMIN — DEXTROSE AND SODIUM CHLORIDE 500 ML: 5; 450 INJECTION, SOLUTION INTRAVENOUS at 13:15

## 2021-03-25 RX ADMIN — Medication 5 ML: at 16:25

## 2021-03-25 RX ADMIN — MORPHINE SULFATE 2 MG: 2 INJECTION, SOLUTION INTRAMUSCULAR; INTRAVENOUS at 16:03

## 2021-03-25 RX ADMIN — ONDANSETRON 8 MG: 8 TABLET, ORALLY DISINTEGRATING ORAL at 14:15

## 2021-03-25 ASSESSMENT — PAIN SCALES - GENERAL: PAINLEVEL: WORST PAIN (10)

## 2021-03-25 NOTE — TELEPHONE ENCOUNTER
Writer received call from Diane with [facility] imaging regarding US positive for new occulusion. Discussed with both Patricia Hodge PA-C, who is seeing Jennifer at 1:20, as well as with David Duncan MD. Follow up to be determined after completion of clinic visit today.

## 2021-03-25 NOTE — TELEPHONE ENCOUNTER
PA Initiation    Medication: Eliquis - Submitted  Insurance Company: [employer] - Phone [phone] Fax [phone]  Pharmacy Filling the Rx:    Filling Pharmacy Phone:    Filling Pharmacy Fax:    Start Date: 3/25/2021        Caron Martinez CPhT  [facility]  Oncology Pharmacy Liaison  [email or web address]  Phone: [phone]  Fax: [phone]

## 2021-03-25 NOTE — TELEPHONE ENCOUNTER
"[facility] Telephone Triage Note    The following symptoms were reported:   Typical  Description:            Onset:  Middle of night, \"literally popped up out of sleep\"  Location: back and both legs  Character: Sharp ache           Intensity: 10/10    Accompanying Signs & Symptoms:  none    Chest Pain:  denies     Shortness of Breath:  denies     Fever:  denies     Chills:  denies   Cough/sore throat:  denies  Other:  Three days ago, noticed on right arm is puffy and swollen, also feels \"knots\" inside arm between elbow and wrist, knot in wrist which has been growing round to golf ball size. Hurts to touch.    Cousin tried to massage arm to get blood flowing as it hurts so bad.Was hoping it would go away but has not.   Denies tingling of fingers and hand. Denies any headaches, dizziness. States swelling does not bother her only if touched.    Therapies Tried and outcome:Tried all medications taken at 7:00am, warm blankets, heat packs and hot bath.     Improved by: nothing    This writer educated on going to ER to assess if blood clot/risk of stroke.   Pt does not want to go to ER. Has a MR scheduled today at [facility],  is aware of risks if pt does not go to ER the risk of blood clot/hx of stroke.     The following provider was consulted:   8:23 paged out Patricia Hodge  9:06am- Per Naveen, Schedule US of right upper extremity to assess for DVT/Blood clot, approved for labs, IVF/Pain for this afternoon if openings.  US Scheduled for 10:00am at [place] US dept.    Appt with Reta Hodge scheduled for 1:20pm today.   [facility].  Labs at 12:30pm.    Scheduling notified    Patient instructions and/or follow up:      Spoke to pt who is aware of provider, labs, IVF/Pain appts.       "

## 2021-03-25 NOTE — Clinical Note
3/25/2021         RE: Jennifer Cervantes  [address]  [place] [address]        Dear Colleague,    Thank you for referring your patient, Jennifer Cervantes, to the [facility]. Please see a copy of my visit note below.    No notes on file    Again, thank you for allowing me to participate in the care of your patient.        Sincerely,        Patricia Hodge PA-C

## 2021-03-25 NOTE — PROGRESS NOTES
Infusion Nursing Note:  Jennifer Cervantes presents for add on IVF/pain medications  Met with RONALDO Meza during infusion.    Note: pt arrives today with 10/10 pain in her back and legs, pt also complaining of a migraine and nausea associated with that.    TORB- RONALDO Meza/Afshan Diop RN  --okay to give 650mg oral tylenol for headache  --no labs needed tomorrow    PRN zofran and benadryl given as well per pt request, pt verbalized relief from nausea/headache. A total of 3 doses of morphine given per orders, pain down to 6 after last dose, pt feeling well enough to go home, but she does want to keep her infusion paresh for tomorrow    Pain: see above    Treatment Conditions:  Lab Results   Component Value Date    HGB 7.4 03/25/2021     Lab Results   Component Value Date    WBC 6.2 03/25/2021      Lab Results   Component Value Date    ANEU 2.9 03/25/2021     Lab Results   Component Value Date     03/25/2021      Lab Results   Component Value Date     03/25/2021                   Lab Results   Component Value Date    POTASSIUM 3.6 03/25/2021           Lab Results   Component Value Date    MAG 1.7 02/21/2021            Lab Results   Component Value Date    CR 0.47 03/25/2021                   Lab Results   Component Value Date    MICAH 9.0 03/25/2021                Lab Results   Component Value Date    BILITOTAL 2.0 03/25/2021           Lab Results   Component Value Date    ALBUMIN 4.1 03/25/2021                    Lab Results   Component Value Date     03/25/2021           Lab Results   Component Value Date    AST 95 03/25/2021         Intravenous Access:  Implanted Port.    Post Infusion Assessment:  Patient tolerated infusion without incident.  Blood return noted pre and post infusion.  No evidence of extravasations.  Access discontinued per protocol.    Discharge Plan:   Prescription refills given for eliquis.  Discharge instructions reviewed with: Patient.  Patient and/or family verbalized  understanding of discharge instructions and all questions answered.  AVS to patient via [employer].  Patient will return tomorrow for next appointment.   Patient discharged in stable condition accompanied by: selvin.    Afshan Diop, RN, RN

## 2021-03-26 ENCOUNTER — HOME INFUSION (PRE-WILLOW HOME INFUSION) (OUTPATIENT)
Dept: PHARMACY | Facility: CLINIC | Age: 21
End: 2021-03-26

## 2021-03-26 ENCOUNTER — TELEPHONE (OUTPATIENT)
Dept: ONCOLOGY | Facility: CLINIC | Age: 21
End: 2021-03-26

## 2021-03-26 ENCOUNTER — INFUSION THERAPY VISIT (OUTPATIENT)
Dept: ONCOLOGY | Facility: CLINIC | Age: 21
End: 2021-03-26
Attending: PEDIATRICS
Payer: COMMERCIAL

## 2021-03-26 VITALS
HEART RATE: 118 BPM | SYSTOLIC BLOOD PRESSURE: 149 MMHG | WEIGHT: 162.9 LBS | OXYGEN SATURATION: 98 % | DIASTOLIC BLOOD PRESSURE: 82 MMHG | TEMPERATURE: 98 F | BODY MASS INDEX: 27.96 KG/M2

## 2021-03-26 VITALS — DIASTOLIC BLOOD PRESSURE: 80 MMHG | SYSTOLIC BLOOD PRESSURE: 134 MMHG

## 2021-03-26 DIAGNOSIS — I82.611 SUPERFICIAL VENOUS THROMBOSIS OF ARM, RIGHT: ICD-10-CM

## 2021-03-26 DIAGNOSIS — D57.1 HB-SS DISEASE WITHOUT CRISIS (H): Primary | ICD-10-CM

## 2021-03-26 DIAGNOSIS — D57.00 SICKLE CELL PAIN CRISIS (H): ICD-10-CM

## 2021-03-26 DIAGNOSIS — D57.00 SICKLE CELL CRISIS (H): ICD-10-CM

## 2021-03-26 DIAGNOSIS — D57.1 HB-SS DISEASE WITHOUT CRISIS (H): ICD-10-CM

## 2021-03-26 DIAGNOSIS — G47.00 INSOMNIA, UNSPECIFIED TYPE: ICD-10-CM

## 2021-03-26 PROCEDURE — 250N000011 HC RX IP 250 OP 636: Performed by: PEDIATRICS

## 2021-03-26 PROCEDURE — 258N000003 HC RX IP 258 OP 636: Performed by: PEDIATRICS

## 2021-03-26 PROCEDURE — 85045 AUTOMATED RETICULOCYTE COUNT: CPT | Performed by: PHYSICIAN ASSISTANT

## 2021-03-26 PROCEDURE — 96374 THER/PROPH/DIAG INJ IV PUSH: CPT

## 2021-03-26 PROCEDURE — G0463 HOSPITAL OUTPT CLINIC VISIT: HCPCS

## 2021-03-26 PROCEDURE — 96361 HYDRATE IV INFUSION ADD-ON: CPT

## 2021-03-26 PROCEDURE — 99215 OFFICE O/P EST HI 40 MIN: CPT | Performed by: PEDIATRICS

## 2021-03-26 PROCEDURE — 96376 TX/PRO/DX INJ SAME DRUG ADON: CPT

## 2021-03-26 RX ORDER — HEPARIN SODIUM,PORCINE 10 UNIT/ML
5 VIAL (ML) INTRAVENOUS
Status: CANCELLED | OUTPATIENT
Start: 2021-03-26

## 2021-03-26 RX ORDER — DIPHENHYDRAMINE HCL 25 MG
25 CAPSULE ORAL
Status: CANCELLED
Start: 2021-03-26

## 2021-03-26 RX ORDER — DIPHENHYDRAMINE HCL 25 MG
25-50 CAPSULE ORAL
Qty: 60 CAPSULE | Refills: 3 | Status: SHIPPED | OUTPATIENT
Start: 2021-03-26 | End: 2021-05-19

## 2021-03-26 RX ORDER — HEPARIN SODIUM (PORCINE) LOCK FLUSH IV SOLN 100 UNIT/ML 100 UNIT/ML
5 SOLUTION INTRAVENOUS
Status: CANCELLED | OUTPATIENT
Start: 2021-03-26

## 2021-03-26 RX ORDER — ONDANSETRON 8 MG/1
8 TABLET, FILM COATED ORAL
Status: CANCELLED
Start: 2021-03-26

## 2021-03-26 RX ORDER — MORPHINE SULFATE 2 MG/ML
2 INJECTION, SOLUTION INTRAMUSCULAR; INTRAVENOUS
Status: CANCELLED
Start: 2021-03-26

## 2021-03-26 RX ORDER — ACETAMINOPHEN 325 MG/1
650 TABLET ORAL EVERY 6 HOURS PRN
Qty: 120 TABLET | Refills: 3 | Status: SHIPPED | OUTPATIENT
Start: 2021-03-26 | End: 2021-09-20

## 2021-03-26 RX ORDER — HEPARIN SODIUM (PORCINE) LOCK FLUSH IV SOLN 100 UNIT/ML 100 UNIT/ML
5 SOLUTION INTRAVENOUS
Status: DISCONTINUED | OUTPATIENT
Start: 2021-03-26 | End: 2021-03-26 | Stop reason: HOSPADM

## 2021-03-26 RX ORDER — MORPHINE SULFATE 2 MG/ML
2 INJECTION, SOLUTION INTRAMUSCULAR; INTRAVENOUS
Status: DISCONTINUED | OUTPATIENT
Start: 2021-03-26 | End: 2021-03-26 | Stop reason: HOSPADM

## 2021-03-26 RX ADMIN — Medication 5 ML: at 16:45

## 2021-03-26 RX ADMIN — MORPHINE SULFATE 2 MG: 2 INJECTION, SOLUTION INTRAMUSCULAR; INTRAVENOUS at 14:04

## 2021-03-26 RX ADMIN — MORPHINE SULFATE 2 MG: 2 INJECTION, SOLUTION INTRAMUSCULAR; INTRAVENOUS at 16:02

## 2021-03-26 RX ADMIN — DEXTROSE AND SODIUM CHLORIDE 500 ML: 5; 450 INJECTION, SOLUTION INTRAVENOUS at 13:57

## 2021-03-26 RX ADMIN — MORPHINE SULFATE 2 MG: 2 INJECTION, SOLUTION INTRAMUSCULAR; INTRAVENOUS at 15:04

## 2021-03-26 ASSESSMENT — PAIN SCALES - GENERAL: PAINLEVEL: EXTREME PAIN (9)

## 2021-03-26 NOTE — TELEPHONE ENCOUNTER
Pt called in to triage stating she forgot to inform Dr. Duncan at her earlier visit that she had been experiencing blurry vision for the past 1-2 months now. Stated this only occurs once in a while, always when she is on a device, like her phone or watching television, vision appears blurry to where she still knows what's in front of her but its unclear. She wears her glasses most of the time. State this can last up to 30 min or longer then vision is normal again.    Last saw eye doctor 7/29/20 Dr. Mcclendon, last vision prescription was from online, refraction Rx by Dr. Mcclendon. Wonders if her Rx is correct as she never had her vision checked with the glasses on, she originally went to [facility] with her Rx but when they stated they were unable to use her insurance she just bought it online instead.    Denied any eye pain, drainage, redness, new swelling, double vision, seeing lights flashing or black dots. Advised pt to schedule follow-up eye exam with Dr. Mcclendon again, will route to care team follow-up if anything more urgent should be done. Phone number to Eye clinic given to pt who verbalized understanding to call back if symptoms worsen prior to apt with clinic.

## 2021-03-26 NOTE — LETTER
3/26/2021         RE: Jennifer Cervantes  [address]  [facility] [address]        Dear Colleague,    Thank you for referring your patient, Jennifer Cervantes, to the [facility]. Please see a copy of my visit note below.    Sickle Cell Outpatient Follow Up Visit    Date of encounter: Mar 26, 2021    Jennifer Cervantes is a 21 year old female who is being seen in clinic for hospital follow up and health maintenance related to SCD      Interval History: Jennifer has had a tougher course in the past month. She has been on qAM Oxycontin with some improvement though she frequently still comes to the ED, more than last year. Some of that seems to be coming from stress at home, as she admits that she does not have support from mom and family, though Mom is her PCA. She adamantly denies any violence or abuse but does not have emotional support. She reports good adherence to her medications including her anticoagulation, so it surprised her to find out she had an clot in her arm (new SVT, unclear age DVT). She was just recently discharged from the hospital and a PIV was required due to giving Desferal. The innominate vein DVT of unclear age could hypothetically be due to any of her 17 total ports that she has had over her life. She denies fever. Pain is reasonable today. No other acute illnesses or sick contacts. She is taking the Jadenu but is looking to be able to do more Desferal. She is taking her HU. No bleeding symptoms. She is holding ASA appropriately.    From a pain standpoint, her big ask is to reconsider BID Oxycontin.    History of Present Illness:  (Initial visit remarks from Dr. 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 She also has significant anxiety and depression with a strong anxiety about transferring to the adult clinic. She usually has pain crises secondary to the anxiety.      --------------------------------  Plan last reviewed with patient: 3/26/2021    Patient background: 20yo F, enjoys movies and kids though there are times where she does not really want to talk to people. Does not have a lot of social support at home.     Sickle Cell Disease History  Primary Hematologist: Perla  PCP: Raul  Genotype: SS  Acute Pain Crisis Treatment:    ER/Acute Care/Infusion Clinic:   o Morphine 2 mg IVP/SC Q1H X 3 doses  o Toradol x1 (avoid through early May due to being on anticoagulation)  o Maintenance IV fluids with LR  o Other: Benadryl PO and Zofran 8 mg IV PRN itching or nausea    Inpatient:  o Opioid: Morphine 2 mg IV Q1H PRN until PCA starts  o PCA plan:   - PCA button dose: Morphine 1 mg  - Lockout: 20 minutes  - Continuous Infusion: consider 1 mg/hr  - One hr max: 4 mg  o Other Medications: Benadryl, Zofran  o If PCA not working, she Has had success with ketamine starting at 4mg/h and advancing only to 6mg/h, as 8mg/h made her feel quite poorly.  o ASA on hold (ok to give celebrex)  o Supportive Care: Docusate, Senna  Chronic Pain Medications:    Home regimen Oxycontin 10 mg q12h, oxycodone 10 mg p.o. q.6 hours p.r.n. breakthrough pain.  She also continues on Celebrex, and Zoloft among other medications.    -Also benefits from mental health visits, acupuncture  Baseline Hemoglobin: 7 g/dl without chronic transfusions  Hydroxyurea use: Yes  H/O blood transfusions: Yes, several (iron overload) Most recent 8/21/20    H/O Transfusion Reactions: no    Antibodies:none  H/O Acute Chest Syndrome: Yes    Last episode: 10/2019     ICU/intubation: unsure  H/O Stroke: Yes (managed with chronic transfusions in the past)  H/O VTE: Yes (2/2021)  H/O Cholecystectomy or Splenectomy: no  H/O Asthma, Pulm HTN, AVN, Leg  Ulcers, Nephropathy, Retinopathy, etc: Iron overload, asthma, chronic lung disease, mild developmental delay from early stroke    -------------------------------------------    Sickle Cell Disease Comprehensive Checklist    Bone Health/Avascular Necrosis Screening/Symptoms (each visit): no new concerns today    Leg Ulcer evaluation (every visit): none    Hypertension (every visit): mildly hypertensive recently    Last ophthalmologic exam: 7/29/20    Last urinalysis for microalbuminuria/CKD (annually): within last year    Last pulmonary evaluation (asthma, AMAN, pulm HTN): within last year    Stroke/silent cerebral infarct Hx (Y/N): Yes TIA ~2014, first event ~age 1 with full stroke and R sided weakness    Last PCP Visit: 8/2020    Vaccines:  o PCV13: 5/13/19  o Pneumovax (PPSV23): 3/04, 10/09, 7/12/19 (next due 7/2024)  o Menactra: 4/2010, 9/2015 (MCV overdue Sept 2020)  o Influenza: got 20-21 vaccine per self report    Audiology (chelation): done 6/2020, normal    Past Medical History:  Past Medical History:   Diagnosis Date     Anemia      Anxiety      Bleeding disorder (H)      Blood clotting disorder (H)      Cerebral infarction (H) 2015     Cognitive developmental delay     low IQ. 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 (H)     right hand contractures     Iron overload due to repeated red blood cell transfusions      Migraines      Multiple subsegmental pulmonary emboli without acute cor pulmonale (H) 02/01/2021     Oppositional defiant behavior      Uncomplicated asthma        Past Surgical History:  Past Surgical History:   Procedure Laterality Date     AS INSERT TUNNELED CV 2 CATH W/O PORT/PUMP       C BREAST REDUCTION (INCLUDES LIPO) TIER 3 Bilateral 04/23/2019     CHOLECYSTECTOMY       IR CVC NON TUNNEL PLACEMENT  04/07/2020     REPAIR TENDON ELBOW Right 10/02/2019     Procedure: Right Elbow Flexor Lengthening, Flexor Pronator Slide Of Wrist and Finger, Thumb Adductor Lengthening;  Surgeon: Anai Franco MD;  Location: [facility] OR     TONSILLECTOMY Bilateral 10/02/2019    Procedure: Bilateral Tonsillectomy;  Surgeon: Farhana Guy MD;  Location: [facility] OR       Family History:   Family History   Problem Relation Age of Onset     Sickle Cell Trait Mother      Hypertension Mother      Asthma Mother      Sickle Cell Trait Father        Social History:  Social History     Socioeconomic History     Marital status: Single     Spouse name: Not on file     Number of children: Not on file     Years of education: Not on file     Highest education level: Not on file   Occupational History     Not on file   Social Needs     Financial resource strain: Not on file     Food insecurity     Worry: Not on file     Inability: Not on file     Transportation needs     Medical: Not on file     Non-medical: Not on file   Tobacco Use     Smoking status: Never Smoker     Smokeless tobacco: Never Used   Substance and Sexual Activity     Alcohol use: Not Currently     Alcohol/week: 0.0 standard drinks     Drug use: Never     Sexual activity: Not Currently     Partners: Male     Birth control/protection: Other   Lifestyle     Physical activity     Days per week: Not on file     Minutes per session: Not on file     Stress: Not on file   Relationships     Social connections     Talks on phone: Not on file     Gets together: Not on file     Attends Yazidism service: Not on file     Active member of club or organization: Not on file     Attends meetings of clubs or organizations: Not on file     Relationship status: Not on file     Intimate partner violence     Fear of current or ex partner: Not on file     Emotionally abused: Not on file     Physically abused: Not on file     Forced sexual activity: Not on file   Other Topics Concern     Parent/sibling w/ CABG, MI or angioplasty before 65F 55M?  "Not Asked   Social History Narrative    Lives with mom and extended family but \"toxic environment\" per her report. She would like to move out but cannot financially do so. She has minimal support at home despite her significant SCD comorbidities and cognitive delay from stroke.       Medications:  Current Outpatient Medications   Medication     acetaminophen (TYLENOL) 325 MG tablet     albuterol (PROAIR HFA/PROVENTIL HFA/VENTOLIN HFA) 108 (90 Base) MCG/ACT inhaler     albuterol (PROVENTIL) (2.5 MG/3ML) 0.083% neb solution     Apixaban Starter Pack (ELIQUIS DVT/PE STARTER PACK) 5 MG TBPK     ARIPiprazole (ABILIFY) 2 MG tablet     aspirin (ASPIRIN) 81 MG EC tablet     budesonide-formoterol (SYMBICORT) 160-4.5 MCG/ACT Inhaler     celecoxib (CELEBREX) 100 MG capsule     diclofenac (VOLTAREN) 1 % topical gel     diphenhydrAMINE (BENADRYL) 25 MG capsule     Hydroxyurea 1000 MG TABS     JADENU 360 MG tablet     medroxyPROGESTERone (DEPO-PROVERA) 150 MG/ML IM injection     naloxone (NARCAN) 4 MG/0.1ML nasal spray     ondansetron (ZOFRAN) 8 MG tablet     oxyCODONE (OXYCONTIN) 10 MG 12 hr tablet     oxyCODONE IR (ROXICODONE) 15 MG tablet     sertraline (ZOLOFT) 100 MG tablet     EPINEPHrine (ANY BX GENERIC EQUIV) 0.3 MG/0.3ML injection 2-pack     No current facility-administered medications for this visit.      Facility-Administered Medications Ordered in Other Visits   Medication     diphenhydrAMINE (BENADRYL) capsule 25 mg     ondansetron (ZOFRAN) injection 8 mg         Physical Exam:   BP (!) 149/82   Pulse 118   Temp 98  F (36.7  C) (Oral)   Wt 73.9 kg (162 lb 14.4 oz)   SpO2 98%   BMI 27.96 kg/m       GEN: young  female, appears comfortable in clinic, seems to be in a good mood  HEENT: no icterus, MMM  CV: RRR, no murmurs  NECK: no visible asymmetry  RESP: speaking in full sentences, no increased work of breathing, clear to auscultation  SKIN: no bruising noted  MSK: contracture at right wrist " (chronic), now with slight edema compared to previous visits but non painful to touch  NEURO: alert and oriented      Labs:   Results for HIMANSHU AL (MRN [id number]) as of 3/28/2021 20:26   Ref. Range 3/25/2021 12:41   Sodium Latest Ref Range: 133 - 144 mmol/L 138   Potassium Latest Ref Range: 3.4 - 5.3 mmol/L 3.6   Chloride Latest Ref Range: 94 - 109 mmol/L 112 (H)   Carbon Dioxide Latest Ref Range: 20 - 32 mmol/L 22   Urea Nitrogen Latest Ref Range: 7 - 30 mg/dL 7   Creatinine Latest Ref Range: 0.52 - 1.04 mg/dL 0.47 (L)   GFR Estimate Latest Ref Range: >60 mL/min/1.73_m2 >90   GFR Estimate If Black Latest Ref Range: >60 mL/min/1.73_m2 >90   Calcium Latest Ref Range: 8.5 - 10.1 mg/dL 9.0   Anion Gap Latest Ref Range: 3 - 14 mmol/L 4   Albumin Latest Ref Range: 3.4 - 5.0 g/dL 4.1   Protein Total Latest Ref Range: 6.8 - 8.8 g/dL 8.4   Bilirubin Total Latest Ref Range: 0.2 - 1.3 mg/dL 2.0 (H)   Alkaline Phosphatase Latest Ref Range: 40 - 150 U/L 72   ALT Latest Ref Range: 0 - 50 U/L 111 (H)   AST Latest Ref Range: 0 - 45 U/L 95 (H)   Glucose Latest Ref Range: 70 - 99 mg/dL 82   WBC Latest Ref Range: 4.0 - 11.0 10e9/L 6.2   Hemoglobin Latest Ref Range: 11.7 - 15.7 g/dL 7.4 (L)   Hematocrit Latest Ref Range: 35.0 - 47.0 % 21.9 (L)   Platelet Count Latest Ref Range: 150 - 450 10e9/L 282   RBC Count Latest Ref Range: 3.8 - 5.2 10e12/L 2.21 (L)   MCV Latest Ref Range: 78 - 100 fl 99   MCH Latest Ref Range: 26.5 - 33.0 pg 33.5 (H)   MCHC Latest Ref Range: 31.5 - 36.5 g/dL 33.8   RDW Latest Ref Range: 10.0 - 15.0 % 23.7 (H)   Diff Method Unknown Automated Method   % Neutrophils Latest Units: % 46.1   % Lymphocytes Latest Units: % 37.9   % Monocytes Latest Units: % 7.9   % Eosinophils Latest Units: % 5.8   % Basophils Latest Units: % 2.1   % Immature Granulocytes Latest Units: % 0.2   Nucleated RBCs Latest Ref Range: 0 /100 4 (H)   Absolute Neutrophil Latest Ref Range: 1.6 - 8.3 10e9/L 2.9   Absolute Lymphocytes Latest  Ref Range: 0.8 - 5.3 10e9/L 2.3   Absolute Monocytes Latest Ref Range: 0.0 - 1.3 10e9/L 0.5   Absolute Eosinophils Latest Ref Range: 0.0 - 0.7 10e9/L 0.4   Absolute Basophils Latest Ref Range: 0.0 - 0.2 10e9/L 0.1   Abs Immature Granulocytes Latest Ref Range: 0 - 0.4 10e9/L 0.0   Absolute Nucleated RBC Unknown 0.2   % Retic Latest Ref Range: 0.5 - 2.0 % 11.4 (H)   Absolute Retic Latest Ref Range: 25 - 95 10e9/L 251.9 (H)       Imaging:   Clinical history:  21-year-old woman with sickle cell disease. CMR to assess for cardiac siderosis.      Comparison CMR: none     1. The left ventricle is normal in cavity size and wall thickness. There are no regional wall motion  abnormalities. The global systolic function is normal. The LVEF is 55%.     2. The right ventricle is normal in cavity size. The global systolic function is normal. The RVEF is 47%.      3. Both atria are normal in size.     4. There is no significant valvular disease.      5. There is no pericardial effusion.     6. The average T2* value is 26 ms (normal).      CONCLUSIONS:   Normal biventricular function with no evidence of cardiac siderosis.     Ferriscan still pending.     ----------    Assessment:  Jennifer Cervantes is a 21 year old female with HbSS. Her disease has been complicated by stroke leading to significant cognitive delay and right sided hemiparesis, high anxiety leading to frequent pain crises on top of chronic pain syndrome, poor social structure at home with no real support, and iron overload secondary to repeated transfusions. She has had significant stressors at home and her 2021 has been marked by a long admission and separately, PE and SVT + DVT in RUE of unclear chronicity.    Major Topics of the Visit:  1. Pain Management: We discussed our current strategy. We will go back to BID Oxycontin (has been on qAM dosing for a month or more) but stay at the 10 mg dosing for now instead of the 15 mg dosing. She is still using naproxen as well  but more PRN. I suspect her frequent ED visits without the need for admission (excepting the recent admission this month) are somewhat driven by stress at home, which is obviously poorly responsive to analgesic management. She said she is still talking with a counselor but is more strongly considering moving out of her mom's house, which adds stress. We will continue to push for more home support.  2. Iron overload/Pharmacy Issues: She has been on Jadenu for a few months and got Desferal during her admission (never got a chance to do it in clinic). We are trying to work with [facility] to do weekend high dose Desferal x48 hours but still have not figured out the orders quite yet.  3. High RBC turnover: Jennifer really has a high degree of RBC turnover, more than most patients I have seen. Oxybryta led to more frequent pain episodes (chest pain, which was new) and did not change the RBC turnover so it was stopped in December.  4. Pulmonary Emboli + new RUE DVT (innominate vein) of unclear chronicity + new symptomatic R cephalic SVT this week: Switched to apixaban with full starter pack given the new clots. No obvious trigger. I will treat the SVT primarily given the symptoms, and the timing comes out for 6 weeks to be around the time she would have finished the rivaroxaban. We will re-address the potential for long-term anticoagulation in a month. Holding aspirin for 3 months.  5. Follow up: 4 weeks with me.     Review of external notes as documented above   Review of the result(s) of each unique test - CBC, retic, CMP, ferritin, cardiac MRI, Ferriscan  Diagnosis or treatment significantly limited by social determinants of health - sickle cell disease, racial inequity, difficult social situation at home    45 min spent on the date of the encounter in chart review, patient visit, review of tests, documentation and/or discussion with other providers about the issues documented above.       Eric JOSHUA  MD Perla  Hematologist  Division of Hematology, Oncology, and Transplantation  [facility]  [facility]  Pager: [phone]      Again, thank you for allowing me to participate in the care of your patient.      Sincerely,    Eric Duncan MD

## 2021-03-26 NOTE — PROGRESS NOTES
Infusion Nursing Note:  Jennifer Cervantes presents today for IVF and pain medications.    Patient seen by provider today: Yes: Dr. Duncan   present during visit today: Not Applicable.    Note: pt had provider appointment prior to infusion.   Presents for IVF and pain medications. Pt having 9/10 back pain. Denies headache today.  Morphine given X 3 with some relief.      Intravenous Access:  Implanted Port.    Treatment Conditions:  Not Applicable.    Post Infusion Assessment:  Patient tolerated infusion without incident.  Blood return noted pre and post infusion.  Site patent and intact, free from redness, edema or discomfort.  No evidence of extravasations.  Access discontinued per protocol.       Discharge Plan:   Patient declined prescription refills.  Copy of AVS reviewed with patient and/or family.  No future infusions appointments scheduled at this time. Verified that patient had triage number if she felt she needed additional infusion appointments.  Patient discharged in stable condition accompanied by: self.  Departure Mode: Ambulatory.    Claudia Batista RN

## 2021-03-26 NOTE — PATIENT INSTRUCTIONS
[facility] Triage and after hours / weekends / holidays:  [phone]    Please call the triage or after hours line if you experience a temperature greater than or equal to 100.5, shaking chills, have uncontrolled nausea, vomiting and/or diarrhea, dizziness, shortness of breath, chest pain, bleeding, unexplained bruising, or if you have any other new/concerning symptoms, questions or concerns.      If you are having any concerning symptoms or wish to speak to a provider before your next infusion visit, please call your care coordinator or triage to notify them so we can adequately serve you.     If you need a refill on a narcotic prescription or other medication, please call before your infusion appointment.                 March 2021 Sunday Monday Tuesday Wednesday Thursday Friday Saturday        1    Admission   8:11 PM   Raul Diaz DO   [facility] Emergency Department   (Discharge: 3/2/2021) 2     3     4  Happy Birthday!    Admission   2:19 PM   Alhaji Winters MD   [facility] [address] [place]   (Discharge: 3/16/2021) 5     6    XR CHEST PORT 1 VIEW   8:40 AM   (20 min.)   [id number]   [facility] Imaging   7     8    XR CHEST 2 VIEWS   8:05 AM   (15 min.)   [id number]   [facility] 9     10    XR CHEST 2 VIEWS  11:40 AM   (15 min.)   [id number]   [facility] 11     12     13       14     15     16     17    Admission   8:49 PM   Huang Domingo MD   [facility] Emergency Department   (Discharge: 3/18/2021)    US LWR EXT VENOUS DUPLEX BILAT  10:50 PM   (40 min.)   [id number]   [facility] 18    UMP ONC INFUSION 120   3:00 PM   (120 min.)   [facility] ONCOLOGY INFUSION   [facility] 19    Admission  12:07 PM   Ifeanyi Gaitan MD   [facility] Emergency Department   (Discharge: 3/19/2021) 20    Admission  11:20 AM   Pedro Ryan MD   [facility]  Emergency Department   (Discharge: 3/20/2021)   21    Admission  11:16 AM   Cornelia Malloy MD   [facility] Emergency Department   (Discharge: 3/21/2021) 22    [facility] ONC INFUSION 120   1:00 PM   (120 min.)   UC ONCOLOGY INFUSION   [facility] 23    RETURN  10:05 AM   (50 min.)   Patricia Hodge PA-C   [facility] ONC INFUSION 120  11:30 AM   (120 min.)   UC ONCOLOGY INFUSION   [facility] 24    [facility] ONC INFUSION 120  10:30 AM   (120 min.)   UC ONCOLOGY INFUSION   [facility] 25    MR MYOCARDIUM WO   9:30 AM   (105 min.)   [id number]   [facility]    US VENOUS  10:00 AM   (30 min.)   UUUS4   [facility]    MR ABDOMEN WO  11:00 AM   (45 min.)   [id number]   [facility]    LAB CENTRAL  12:15 PM   (15 min.)    [facility] LAB DRAW   [facility] ONC INFUSION 120   1:00 PM   (120 min.)   UC ONCOLOGY INFUSION   [facility]    RETURN   1:05 PM   (50 min.)   Patricia Hodge PA-C   [facility] 26    UMP RETURN  12:45 PM   (30 min.)   Eric Duncan MD   [facility] ONC INFUSION 120   1:30 PM   (120 min.)   UC ONCOLOGY INFUSION   [facility] 27       28     29     30     31 April 2021 Sunday Monday Tuesday Wednesday Thursday Friday Saturday                       1     2     3       4     5     6    UMP RETURN  10:15 AM   (30 min.)   Suraj Case MD   [facility] [place] 7     8     9     10       11     12     13     14     15     16     17       18     19     20     21     22     23     24       25     26     27     28     29     30                       No results found for this or any previous visit  (from the past 24 hour(s)).

## 2021-03-26 NOTE — PROGRESS NOTES
Patient discharged after receiving final dose of morphine with pain rated 6/10. She felt this was adequate to discharge home on oral medications and she will present to the ED if needed for further pain management.

## 2021-03-26 NOTE — NURSING NOTE
"Oncology Rooming Note    March 26, 2021 12:57 PM   Jennifer Cervantes is a 21 year old female who presents for:    Chief Complaint   Patient presents with     Oncology Clinic Visit     sickle cell     Initial Vitals: BP (!) 149/82   Pulse 118   Temp 98  F (36.7  C) (Oral)   Wt 73.9 kg (162 lb 14.4 oz)   SpO2 98%   BMI 27.96 kg/m   Estimated body mass index is 27.96 kg/m  as calculated from the following:    Height as of 3/21/21: 1.626 m (5' 4\").    Weight as of this encounter: 73.9 kg (162 lb 14.4 oz). Body surface area is 1.83 meters squared.  Extreme Pain (9) Comment: Data Unavailable   No LMP recorded. Patient has had an injection.  Allergies reviewed: Yes  Medications reviewed: Yes    Medications: MEDICATION REFILLS NEEDED TODAY. Provider was notified.   Hydroxyurea, benadryl, ibuprofen and acetaminophen need refills    Pharmacy name entered into [facility]:    [facility] - [place], [place] - [address]  [id number] [address] [facility] - [place] [id number]  [id number] [address] [facility] [address]  [id number] [address] OF [address] [facility] - [place], [place] - [address]    Clinical concerns: none       Eulalia Broderick CMA            "

## 2021-03-27 ENCOUNTER — HOSPITAL ENCOUNTER (EMERGENCY)
Facility: CLINIC | Age: 21
Discharge: HOME | End: 2021-03-27
Attending: EMERGENCY MEDICINE | Admitting: EMERGENCY MEDICINE
Payer: COMMERCIAL

## 2021-03-27 ENCOUNTER — NURSE TRIAGE (OUTPATIENT)
Dept: NURSING | Facility: CLINIC | Age: 21
End: 2021-03-27

## 2021-03-27 VITALS
SYSTOLIC BLOOD PRESSURE: 117 MMHG | WEIGHT: 165 LBS | OXYGEN SATURATION: 97 % | TEMPERATURE: 98.4 F | HEART RATE: 91 BPM | RESPIRATION RATE: 18 BRPM | BODY MASS INDEX: 28.17 KG/M2 | DIASTOLIC BLOOD PRESSURE: 85 MMHG | HEIGHT: 64 IN

## 2021-03-27 DIAGNOSIS — D57.00 SICKLE CELL PAIN CRISIS (H): ICD-10-CM

## 2021-03-27 LAB
ALBUMIN SERPL-MCNC: 4.2 G/DL (ref 3.4–5)
ALP SERPL-CCNC: 75 U/L (ref 40–150)
ALT SERPL W P-5'-P-CCNC: 130 U/L (ref 0–50)
ANION GAP SERPL CALCULATED.3IONS-SCNC: 5 MMOL/L (ref 3–14)
AST SERPL W P-5'-P-CCNC: 110 U/L (ref 0–45)
BASOPHILS # BLD AUTO: 0.2 10E9/L (ref 0–0.2)
BASOPHILS NFR BLD AUTO: 2 %
BILIRUB SERPL-MCNC: 1.8 MG/DL (ref 0.2–1.3)
BUN SERPL-MCNC: 10 MG/DL (ref 7–30)
CALCIUM SERPL-MCNC: 8.9 MG/DL (ref 8.5–10.1)
CHLORIDE SERPL-SCNC: 108 MMOL/L (ref 94–109)
CO2 SERPL-SCNC: 23 MMOL/L (ref 20–32)
CREAT SERPL-MCNC: 0.57 MG/DL (ref 0.52–1.04)
DIFFERENTIAL METHOD BLD: ABNORMAL
EOSINOPHIL # BLD AUTO: 0.7 10E9/L (ref 0–0.7)
EOSINOPHIL NFR BLD AUTO: 7.9 %
ERYTHROCYTE [DISTWIDTH] IN BLOOD BY AUTOMATED COUNT: 24.2 % (ref 10–15)
GFR SERPL CREATININE-BSD FRML MDRD: >90 ML/MIN/{1.73_M2}
GLUCOSE SERPL-MCNC: 109 MG/DL (ref 70–99)
HCG SERPL QL: NEGATIVE
HCT VFR BLD AUTO: 22 % (ref 35–47)
HGB BLD-MCNC: 7.5 G/DL (ref 11.7–15.7)
IMM GRANULOCYTES # BLD: 0 10E9/L (ref 0–0.4)
IMM GRANULOCYTES NFR BLD: 0.3 %
LYMPHOCYTES # BLD AUTO: 3.5 10E9/L (ref 0.8–5.3)
LYMPHOCYTES NFR BLD AUTO: 38.3 %
MCH RBC QN AUTO: 33.5 PG (ref 26.5–33)
MCHC RBC AUTO-ENTMCNC: 34.1 G/DL (ref 31.5–36.5)
MCV RBC AUTO: 98 FL (ref 78–100)
MONOCYTES # BLD AUTO: 0.8 10E9/L (ref 0–1.3)
MONOCYTES NFR BLD AUTO: 8.2 %
NEUTROPHILS # BLD AUTO: 4 10E9/L (ref 1.6–8.3)
NEUTROPHILS NFR BLD AUTO: 43.3 %
NRBC # BLD AUTO: 0.4 10*3/UL
NRBC BLD AUTO-RTO: 5 /100
PLATELET # BLD AUTO: 286 10E9/L (ref 150–450)
POTASSIUM SERPL-SCNC: 3.2 MMOL/L (ref 3.4–5.3)
PROT SERPL-MCNC: 8.5 G/DL (ref 6.8–8.8)
RBC # BLD AUTO: 2.24 10E12/L (ref 3.8–5.2)
RETICS # AUTO: 254.5 10E9/L (ref 25–95)
RETICS/RBC NFR AUTO: 11.4 % (ref 0.5–2)
SODIUM SERPL-SCNC: 136 MMOL/L (ref 133–144)
WBC # BLD AUTO: 9.1 10E9/L (ref 4–11)

## 2021-03-27 PROCEDURE — 85045 AUTOMATED RETICULOCYTE COUNT: CPT | Performed by: EMERGENCY MEDICINE

## 2021-03-27 PROCEDURE — 96374 THER/PROPH/DIAG INJ IV PUSH: CPT

## 2021-03-27 PROCEDURE — 99285 EMERGENCY DEPT VISIT HI MDM: CPT | Performed by: EMERGENCY MEDICINE

## 2021-03-27 PROCEDURE — 96361 HYDRATE IV INFUSION ADD-ON: CPT

## 2021-03-27 PROCEDURE — 250N000013 HC RX MED GY IP 250 OP 250 PS 637: Performed by: EMERGENCY MEDICINE

## 2021-03-27 PROCEDURE — 250N000011 HC RX IP 250 OP 636: Performed by: EMERGENCY MEDICINE

## 2021-03-27 PROCEDURE — 85025 COMPLETE CBC W/AUTO DIFF WBC: CPT | Performed by: EMERGENCY MEDICINE

## 2021-03-27 PROCEDURE — 99285 EMERGENCY DEPT VISIT HI MDM: CPT | Mod: 25

## 2021-03-27 PROCEDURE — 96375 TX/PRO/DX INJ NEW DRUG ADDON: CPT

## 2021-03-27 PROCEDURE — 258N000003 HC RX IP 258 OP 636: Performed by: EMERGENCY MEDICINE

## 2021-03-27 PROCEDURE — 80053 COMPREHEN METABOLIC PANEL: CPT | Performed by: EMERGENCY MEDICINE

## 2021-03-27 PROCEDURE — 96376 TX/PRO/DX INJ SAME DRUG ADON: CPT

## 2021-03-27 PROCEDURE — 84703 CHORIONIC GONADOTROPIN ASSAY: CPT | Performed by: EMERGENCY MEDICINE

## 2021-03-27 RX ORDER — HEPARIN SODIUM (PORCINE) LOCK FLUSH IV SOLN 100 UNIT/ML 100 UNIT/ML
500 SOLUTION INTRAVENOUS ONCE
Status: COMPLETED | OUTPATIENT
Start: 2021-03-27 | End: 2021-03-27

## 2021-03-27 RX ORDER — MORPHINE SULFATE 2 MG/ML
2 INJECTION, SOLUTION INTRAMUSCULAR; INTRAVENOUS
Status: COMPLETED | OUTPATIENT
Start: 2021-03-27 | End: 2021-03-27

## 2021-03-27 RX ORDER — ACETAMINOPHEN 500 MG
1000 TABLET ORAL ONCE
Status: COMPLETED | OUTPATIENT
Start: 2021-03-27 | End: 2021-03-27

## 2021-03-27 RX ORDER — DIPHENHYDRAMINE HCL 25 MG
25 CAPSULE ORAL
Status: COMPLETED | OUTPATIENT
Start: 2021-03-27 | End: 2021-03-27

## 2021-03-27 RX ORDER — ONDANSETRON 2 MG/ML
8 INJECTION INTRAMUSCULAR; INTRAVENOUS
Status: COMPLETED | OUTPATIENT
Start: 2021-03-27 | End: 2021-03-27

## 2021-03-27 RX ADMIN — DIPHENHYDRAMINE HYDROCHLORIDE 25 MG: 25 CAPSULE ORAL at 20:58

## 2021-03-27 RX ADMIN — MORPHINE SULFATE 2 MG: 2 INJECTION, SOLUTION INTRAMUSCULAR; INTRAVENOUS at 20:58

## 2021-03-27 RX ADMIN — MORPHINE SULFATE 2 MG: 2 INJECTION, SOLUTION INTRAMUSCULAR; INTRAVENOUS at 22:13

## 2021-03-27 RX ADMIN — SODIUM CHLORIDE, POTASSIUM CHLORIDE, SODIUM LACTATE AND CALCIUM CHLORIDE 1000 ML: 600; 310; 30; 20 INJECTION, SOLUTION INTRAVENOUS at 21:03

## 2021-03-27 RX ADMIN — Medication 500 UNITS: at 23:15

## 2021-03-27 RX ADMIN — MORPHINE SULFATE 2 MG: 2 INJECTION, SOLUTION INTRAMUSCULAR; INTRAVENOUS at 23:09

## 2021-03-27 RX ADMIN — ACETAMINOPHEN 1000 MG: 500 TABLET, FILM COATED ORAL at 22:13

## 2021-03-27 RX ADMIN — ONDANSETRON 8 MG: 2 INJECTION INTRAMUSCULAR; INTRAVENOUS at 20:58

## 2021-03-27 ASSESSMENT — MIFFLIN-ST. JEOR: SCORE: 1493.44

## 2021-03-27 NOTE — ED TRIAGE NOTES
Pt presents to ED with c/o generalized body sickle pain x 1 day.  Denies fever.  No acute distress noted.

## 2021-03-27 NOTE — TELEPHONE ENCOUNTER
"Patient calling reporting she is having a sickle cell pain crisis.  Low back and leg pain that she rates as \"8\" on 1-10 pain scale. Patient is trying hot packs, pain medication as directed with no relief.   Denies shortness of breath, chest pain, or nausea.   Reporting she took Oxycodone at 8 a.m. along with 1 Ibuprofen, 1 regular strength Tylenol, Celebrex with no relief.     Per triage disposition page on call provider.    Dr Lyn (on call for Dr Duncan) Hematology paged through [facility] to call Patricia at . Paged at 1233 pm.    Dr Lyn returned page and advised to have patient seen in ED.    Patient notified and verbalized understanding.   Agrees to go to [place] ED this afternoon.    Patricia Stanton RN  [facility]    COVID 19 Nurse Triage Plan/Patient Instructions    Please be aware that novel coronavirus (COVID-19) may be circulating in the community. If you develop symptoms such as fever, cough, or SOB or if you have concerns about the presence of another infection including coronavirus (COVID-19), please contact your health care provider or visit [email or web address].     Disposition/Instructions    ED Visit recommended. Follow protocol based instructions.     Bring Your Own Device:  Please also bring your smart device(s) (smart phones, tablets, laptops) and their charging cables for your personal use and to communicate with your care team during your visit.    Thank you for taking steps to prevent the spread of this virus.  o Limit your contact with others.  o Wear a simple mask to cover your cough.  o Wash your hands well and often.    Resources    [facility]: About COVID-19: [email or web address]    CDC: What to Do If You're Sick: www.cdc.gov/coronavirus/2019-ncov/about/steps-when-sick.html    CDC: Ending Home Isolation: www.cdc.gov/coronavirus/2019-ncov/hcp/disposition-in-home-patients.html     CDC: Caring for Someone: " www.cdc.gov/coronavirus/2019-ncov/if-you-are-sick/care-for-someone.html     [facility]: Interim Guidance for Hospital Discharge to Home: www.health.[facility].mn.us/diseases/coronavirus/hcp/hospdischarge.pdf    [facility] clinical trials (COVID-19 research studies): [email or web address].[email or web address]/umn-[email or web address]-trials     Below are the COVID-19 hotlines at the [place] Department of Health ([facility]). Interpreters are available.   o For health questions: Call 803-122-3209 or 1-924.343.9206 (7 a.m. to 7 p.m.)  o For questions about schools and childcare: Call 394-982-4163 or 1-550.456.2255 (7 a.m. to 7 p.m.)                     Reason for Disposition    [1] MODERATE sickle cell pain (sickle cell crisis) AND [2] has pain management plan AND [3] NOT better after taking pain medicine per plan    Additional Information    Negative: SEVERE difficulty breathing (e.g., struggling for each breath, retractions, cyanosis, speaks in single words, pulse > 120)    Negative: Shock suspected (e.g., cold/pale/clammy skin, too weak to stand, low BP, rapid pulse)    Negative: [1] Chest pain lasts > 5 minutes AND [2] history of heart disease  (i.e., heart attack, bypass surgery, angina, angioplasty, CHF; not just a heart murmur)    Negative: [1] Chest pain that lasts > 5 minutes AND [2] described as crushing, pressure-like, or heavy    Negative: Sounds like a life-threatening emergency to the triager    Negative: Pain that follows an injury    Negative: Pain is not typical of patient's previous SCD acute pain attacks    Negative: [1] SEVERE abdominal pain AND [2] present > 1 hour    Negative: MODERATE difficulty breathing (e.g., speaks in phrases, SOB even at rest, pulse 100-120)    Negative: Chest pain > 5 minutes    Negative: [1] Chest pain AND [2] fever > 100.5 F (38.1 C)    Negative: Painful erection lasting longer than 4 hours    Negative: [1] SEVERE sickle cell pain (sickle cell crisis) AND [2] has pain management plan AND [3] NOT better  after taking pain medicine per plan    Negative: [1] SEVERE sickle cell pain AND [2] does NOT have a pain management plan AND [3] NOT better after taking OTC acetaminophen (Tylenol) or ibuprofen (Motrin)    Negative: [1] Yellow eyes AND [2] new onset    Negative: [1] Constant abdominal pain AND [2] present > 2 hours    Negative: Painful joint is swollen    Negative: Painful joint is red    Negative: Fever > 100.5 F (38.1 C)    Negative: [1] Caller has URGENT question AND [2] triager unable to answer question    Protocols used: SICKLE CELL DISEASE - ACUTE PAIN EPISODE (CRISIS)-A-

## 2021-03-28 ENCOUNTER — NURSE TRIAGE (OUTPATIENT)
Dept: NURSING | Facility: CLINIC | Age: 21
End: 2021-03-28

## 2021-03-28 ENCOUNTER — HOSPITAL ENCOUNTER (EMERGENCY)
Facility: CLINIC | Age: 21
Discharge: HOME | End: 2021-03-28
Attending: EMERGENCY MEDICINE | Admitting: EMERGENCY MEDICINE
Payer: COMMERCIAL

## 2021-03-28 VITALS
HEIGHT: 64 IN | BODY MASS INDEX: 28.17 KG/M2 | WEIGHT: 165 LBS | RESPIRATION RATE: 16 BRPM | DIASTOLIC BLOOD PRESSURE: 83 MMHG | OXYGEN SATURATION: 98 % | SYSTOLIC BLOOD PRESSURE: 122 MMHG | TEMPERATURE: 98.3 F | HEART RATE: 109 BPM

## 2021-03-28 DIAGNOSIS — D57.00 SICKLE-CELL DISEASE WITH PAIN (H): ICD-10-CM

## 2021-03-28 PROBLEM — I82.611 SUPERFICIAL VENOUS THROMBOSIS OF ARM, RIGHT: Status: ACTIVE | Noted: 2021-03-25

## 2021-03-28 LAB
ALBUMIN SERPL-MCNC: 4 G/DL (ref 3.4–5)
ALBUMIN UR-MCNC: NEGATIVE MG/DL
ALP SERPL-CCNC: 72 U/L (ref 40–150)
ALT SERPL W P-5'-P-CCNC: 156 U/L (ref 0–50)
ANION GAP SERPL CALCULATED.3IONS-SCNC: 4 MMOL/L (ref 3–14)
ANISOCYTOSIS BLD QL SMEAR: ABNORMAL
APPEARANCE UR: CLEAR
APTT PPP: 64 SEC (ref 22–37)
AST SERPL W P-5'-P-CCNC: 143 U/L (ref 0–45)
BACTERIA #/AREA URNS HPF: ABNORMAL /HPF
BASOPHILS # BLD AUTO: 0.1 10E9/L (ref 0–0.2)
BASOPHILS NFR BLD AUTO: 1.9 %
BILIRUB SERPL-MCNC: 2.2 MG/DL (ref 0.2–1.3)
BILIRUB UR QL STRIP: NEGATIVE
BUN SERPL-MCNC: 8 MG/DL (ref 7–30)
CALCIUM SERPL-MCNC: 8.5 MG/DL (ref 8.5–10.1)
CHLORIDE SERPL-SCNC: 109 MMOL/L (ref 94–109)
CO2 SERPL-SCNC: 22 MMOL/L (ref 20–32)
COLOR UR AUTO: ABNORMAL
CREAT SERPL-MCNC: 0.55 MG/DL (ref 0.52–1.04)
DIFFERENTIAL METHOD BLD: ABNORMAL
EOSINOPHIL # BLD AUTO: 0.9 10E9/L (ref 0–0.7)
EOSINOPHIL NFR BLD AUTO: 12.1 %
ERYTHROCYTE [DISTWIDTH] IN BLOOD BY AUTOMATED COUNT: 24.3 % (ref 10–15)
GFR SERPL CREATININE-BSD FRML MDRD: >90 ML/MIN/{1.73_M2}
GLUCOSE SERPL-MCNC: 79 MG/DL (ref 70–99)
GLUCOSE UR STRIP-MCNC: NEGATIVE MG/DL
HCG SERPL QL: NEGATIVE
HCT VFR BLD AUTO: 22.1 % (ref 35–47)
HGB BLD-MCNC: 7.6 G/DL (ref 11.7–15.7)
HGB UR QL STRIP: NEGATIVE
INR PPP: 1.26 (ref 0.86–1.14)
KETONES UR STRIP-MCNC: NEGATIVE MG/DL
LEUKOCYTE ESTERASE UR QL STRIP: NEGATIVE
LYMPHOCYTES # BLD AUTO: 2.6 10E9/L (ref 0.8–5.3)
LYMPHOCYTES NFR BLD AUTO: 35.5 %
MACROCYTES BLD QL SMEAR: PRESENT
MCH RBC QN AUTO: 34.4 PG (ref 26.5–33)
MCHC RBC AUTO-ENTMCNC: 34.4 G/DL (ref 31.5–36.5)
MCV RBC AUTO: 100 FL (ref 78–100)
MONOCYTES # BLD AUTO: 0.3 10E9/L (ref 0–1.3)
MONOCYTES NFR BLD AUTO: 4.7 %
MUCOUS THREADS #/AREA URNS LPF: PRESENT /LPF
MYELOCYTES # BLD: 0.1 10E9/L
MYELOCYTES NFR BLD MANUAL: 0.9 %
NEUTROPHILS # BLD AUTO: 3.3 10E9/L (ref 1.6–8.3)
NEUTROPHILS NFR BLD AUTO: 44.9 %
NITRATE UR QL: NEGATIVE
NRBC # BLD AUTO: 1 10*3/UL
NRBC BLD AUTO-RTO: 14 /100
OVALOCYTES BLD QL SMEAR: ABNORMAL
PH UR STRIP: 6.5 PH (ref 5–7)
PLATELET # BLD AUTO: 302 10E9/L (ref 150–450)
PLATELET # BLD EST: ABNORMAL 10*3/UL
POIKILOCYTOSIS BLD QL SMEAR: SLIGHT
POLYCHROMASIA BLD QL SMEAR: ABNORMAL
POTASSIUM SERPL-SCNC: 4 MMOL/L (ref 3.4–5.3)
PROT SERPL-MCNC: 8.1 G/DL (ref 6.8–8.8)
RBC # BLD AUTO: 2.21 10E12/L (ref 3.8–5.2)
RBC #/AREA URNS AUTO: 0 /HPF (ref 0–2)
RETICS # AUTO: 238.3 10E9/L (ref 25–95)
RETICS/RBC NFR AUTO: 10.9 % (ref 0.5–2)
SICKLE CELLS BLD QL SMEAR: ABNORMAL
SODIUM SERPL-SCNC: 135 MMOL/L (ref 133–144)
SOURCE: ABNORMAL
SP GR UR STRIP: 1.01 (ref 1–1.03)
SQUAMOUS #/AREA URNS AUTO: <1 /HPF (ref 0–1)
TARGETS BLD QL SMEAR: ABNORMAL
UROBILINOGEN UR STRIP-MCNC: 3 MG/DL (ref 0–2)
WBC # BLD AUTO: 7.3 10E9/L (ref 4–11)
WBC #/AREA URNS AUTO: <1 /HPF (ref 0–5)

## 2021-03-28 PROCEDURE — 84703 CHORIONIC GONADOTROPIN ASSAY: CPT | Performed by: EMERGENCY MEDICINE

## 2021-03-28 PROCEDURE — 96374 THER/PROPH/DIAG INJ IV PUSH: CPT | Performed by: EMERGENCY MEDICINE

## 2021-03-28 PROCEDURE — 99284 EMERGENCY DEPT VISIT MOD MDM: CPT | Performed by: EMERGENCY MEDICINE

## 2021-03-28 PROCEDURE — 85025 COMPLETE CBC W/AUTO DIFF WBC: CPT | Performed by: EMERGENCY MEDICINE

## 2021-03-28 PROCEDURE — 85730 THROMBOPLASTIN TIME PARTIAL: CPT | Performed by: EMERGENCY MEDICINE

## 2021-03-28 PROCEDURE — 258N000003 HC RX IP 258 OP 636: Performed by: EMERGENCY MEDICINE

## 2021-03-28 PROCEDURE — 80053 COMPREHEN METABOLIC PANEL: CPT | Performed by: EMERGENCY MEDICINE

## 2021-03-28 PROCEDURE — 96376 TX/PRO/DX INJ SAME DRUG ADON: CPT | Performed by: EMERGENCY MEDICINE

## 2021-03-28 PROCEDURE — 250N000011 HC RX IP 250 OP 636: Performed by: EMERGENCY MEDICINE

## 2021-03-28 PROCEDURE — 99285 EMERGENCY DEPT VISIT HI MDM: CPT | Mod: 25 | Performed by: EMERGENCY MEDICINE

## 2021-03-28 PROCEDURE — 85610 PROTHROMBIN TIME: CPT | Performed by: EMERGENCY MEDICINE

## 2021-03-28 PROCEDURE — 81001 URINALYSIS AUTO W/SCOPE: CPT | Performed by: EMERGENCY MEDICINE

## 2021-03-28 PROCEDURE — 96361 HYDRATE IV INFUSION ADD-ON: CPT | Performed by: EMERGENCY MEDICINE

## 2021-03-28 PROCEDURE — 85045 AUTOMATED RETICULOCYTE COUNT: CPT | Performed by: EMERGENCY MEDICINE

## 2021-03-28 RX ORDER — MORPHINE SULFATE 2 MG/ML
2 INJECTION, SOLUTION INTRAMUSCULAR; INTRAVENOUS
Status: COMPLETED | OUTPATIENT
Start: 2021-03-28 | End: 2021-03-28

## 2021-03-28 RX ORDER — HEPARIN SODIUM (PORCINE) LOCK FLUSH IV SOLN 100 UNIT/ML 100 UNIT/ML
500 SOLUTION INTRAVENOUS ONCE
Status: COMPLETED | OUTPATIENT
Start: 2021-03-28 | End: 2021-03-28

## 2021-03-28 RX ORDER — MORPHINE SULFATE 4 MG/ML
2 INJECTION, SOLUTION INTRAMUSCULAR; INTRAVENOUS ONCE
Status: COMPLETED | OUTPATIENT
Start: 2021-03-28 | End: 2021-03-28

## 2021-03-28 RX ORDER — ONDANSETRON 2 MG/ML
8 INJECTION INTRAMUSCULAR; INTRAVENOUS ONCE
Status: DISCONTINUED | OUTPATIENT
Start: 2021-03-28 | End: 2021-03-28 | Stop reason: HOSPADM

## 2021-03-28 RX ORDER — DIPHENHYDRAMINE HCL 25 MG
25 CAPSULE ORAL ONCE
Status: DISCONTINUED | OUTPATIENT
Start: 2021-03-28 | End: 2021-03-28 | Stop reason: HOSPADM

## 2021-03-28 RX ADMIN — MORPHINE SULFATE 2 MG: 2 INJECTION, SOLUTION INTRAMUSCULAR; INTRAVENOUS at 13:31

## 2021-03-28 RX ADMIN — MORPHINE SULFATE 2 MG: 2 INJECTION, SOLUTION INTRAMUSCULAR; INTRAVENOUS at 14:38

## 2021-03-28 RX ADMIN — MORPHINE SULFATE 2 MG: 2 INJECTION, SOLUTION INTRAMUSCULAR; INTRAVENOUS at 15:48

## 2021-03-28 RX ADMIN — SODIUM CHLORIDE, POTASSIUM CHLORIDE, SODIUM LACTATE AND CALCIUM CHLORIDE 1000 ML: 600; 310; 30; 20 INJECTION, SOLUTION INTRAVENOUS at 13:28

## 2021-03-28 RX ADMIN — MORPHINE SULFATE 2 MG: 4 INJECTION, SOLUTION INTRAMUSCULAR; INTRAVENOUS at 18:12

## 2021-03-28 RX ADMIN — Medication 500 UNITS: at 20:06

## 2021-03-28 ASSESSMENT — MIFFLIN-ST. JEOR: SCORE: 1493.44

## 2021-03-28 NOTE — TELEPHONE ENCOUNTER
Triage Call:    Patient calling with pain located in her back from the lower back to the shoulders. Denies any pain in the arms or legs.   Rates pain as 9/10.  On a scale of 0-10. Taking celebrex, oxycontin, oxycodone. Taking pain medication per pain management plan, no improvement.   Denies any injury to the back.   States due to the cold she is having increased pain.   Chest pains that come and go, lasting less than 2 minute each episode.     Pt was advised of protocol recommendation/disposition of ED or PCP triage. Pt has a ride already coming to take her to the ED.      Jesica Craig RN 03/28/21 11:16 AM  [facility]    COVID 19 Nurse Triage Plan/Patient Instructions    Please be aware that novel coronavirus (COVID-19) may be circulating in the community. If you develop symptoms such as fever, cough, or SOB or if you have concerns about the presence of another infection including coronavirus (COVID-19), please contact your health care provider or visit [email or web address].     Disposition/Instructions    ED Visit recommended. Follow protocol based instructions.     Bring Your Own Device:  Please also bring your smart device(s) (smart phones, tablets, laptops) and their charging cables for your personal use and to communicate with your care team during your visit.    Thank you for taking steps to prevent the spread of this virus.  o Limit your contact with others.  o Wear a simple mask to cover your cough.  o Wash your hands well and often.    Resources    [facility]: About COVID-19: [email or web address]    CDC: What to Do If You're Sick: www.cdc.gov/coronavirus/2019-ncov/about/steps-when-sick.html    CDC: Ending Home Isolation: www.cdc.gov/coronavirus/2019-ncov/hcp/disposition-in-home-patients.html     CDC: Caring for Someone: www.cdc.gov/coronavirus/2019-ncov/if-you-are-sick/care-for-someone.html     MD: Interim Guidance for Hospital Discharge to Home:  www.health.[facility].mn.us/diseases/coronavirus/hcp/hospdischarge.pdf    [facility] clinical trials (COVID-19 research studies): [email or web address]/umn-[email or web address]-[email or web address]     Below are the COVID-19 hotlines at the [place] Department of Health ([facility]). Interpreters are available.   o For health questions: Call 253-520-0059 or 1-131.971.9730 (7 a.m. to 7 p.m.)  o For questions about schools and childcare: Call 598-828-9580 or 1-865.745.9914 (7 a.m. to 7 p.m.)       Reason for Disposition    [1] SEVERE sickle cell pain (sickle cell crisis) AND [2] has pain management plan AND [3] NOT better after taking pain medicine per plan    Additional Information    Negative: SEVERE difficulty breathing (e.g., struggling for each breath, retractions, cyanosis, speaks in single words, pulse > 120)    Negative: Shock suspected (e.g., cold/pale/clammy skin, too weak to stand, low BP, rapid pulse)    Negative: [1] Chest pain lasts > 5 minutes AND [2] history of heart disease  (i.e., heart attack, bypass surgery, angina, angioplasty, CHF; not just a heart murmur)    Negative: [1] Chest pain that lasts > 5 minutes AND [2] described as crushing, pressure-like, or heavy    Negative: Sounds like a life-threatening emergency to the triager    Negative: Pain that follows an injury    Negative: Pain is not typical of patient's previous SCD acute pain attacks    Negative: Chest pain > 5 minutes    Negative: MODERATE difficulty breathing (e.g., speaks in phrases, SOB even at rest, pulse 100-120)    Negative: [1] SEVERE abdominal pain AND [2] present > 1 hour    Negative: [1] Chest pain AND [2] fever > 100.5 F (38.1 C)    Negative: Painful erection lasting longer than 4 hours    Protocols used: SICKLE CELL DISEASE - ACUTE PAIN EPISODE (CRISIS)-A-

## 2021-03-28 NOTE — ED PROVIDER NOTES
ED Provider Note  [facility]      History     Chief Complaint   Patient presents with     Sickle Cell Pain Crisis     The history is provided by the patient and medical records.     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 Patient reports similar pain to prior sickle cell pain crisis episodes. She notes an onset of bilateral arm, bilateral leg, and lower back pain since last night. Patient reports that she has tried everything listed in her sickle cell pain crisis care plan with no relief. Denies any new swelling. No fevers, shortness of breath, or chest pain.    Per chart review, patient is well-known to [facility] ED due to sickle cell pain crisis. The patient's most recent visit to the [facility] ED was on 3/21/2021 for evaluation of back and extremities pain secondary to sickle cell pain crisis. IV access was obtained via port-a-catheter, and patient was given 2 mg morphine (IV) x3 per the patient's care plan.     Past Medical History  Past Medical History:   Diagnosis Date     Anemia      Anxiety      Bleeding disorder (H)      Blood clotting disorder (H)      Cerebral infarction (H) 2015     Cognitive developmental delay     low IQ. 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 (H)     right hand contractures     Iron overload due to repeated red blood cell transfusions      Migraines      Multiple subsegmental pulmonary emboli without acute cor pulmonale (H) 02/01/2021     Oppositional defiant behavior      Superficial venous thrombosis of arm, right 03/25/2021     Uncomplicated asthma      Past Surgical History:   Procedure Laterality Date     AS  INSERT TUNNELED CV 2 CATH W/O PORT/PUMP       C BREAST REDUCTION (INCLUDES LIPO) TIER 3 Bilateral 04/23/2019     CHOLECYSTECTOMY       IR CVC NON TUNNEL PLACEMENT  04/07/2020     REPAIR TENDON ELBOW Right 10/02/2019    Procedure: Right Elbow Flexor Lengthening, Flexor Pronator Slide Of Wrist and Finger, Thumb Adductor Lengthening;  Surgeon: Anai Franco MD;  Location: [facility] OR     TONSILLECTOMY Bilateral 10/02/2019    Procedure: Bilateral Tonsillectomy;  Surgeon: Farhana Guy MD;  Location: [facility] OR     acetaminophen (TYLENOL) 325 MG tablet  albuterol (PROAIR HFA/PROVENTIL HFA/VENTOLIN HFA) 108 (90 Base) MCG/ACT inhaler  albuterol (PROVENTIL) (2.5 MG/3ML) 0.083% neb solution  Apixaban Starter Pack (ELIQUIS DVT/PE STARTER PACK) 5 MG TBPK  ARIPiprazole (ABILIFY) 2 MG tablet  aspirin (ASPIRIN) 81 MG EC tablet  budesonide-formoterol (SYMBICORT) 160-4.5 MCG/ACT Inhaler  celecoxib (CELEBREX) 100 MG capsule  diclofenac (VOLTAREN) 1 % topical gel  diphenhydrAMINE (BENADRYL) 25 MG capsule  EPINEPHrine (ANY BX GENERIC EQUIV) 0.3 MG/0.3ML injection 2-pack  Hydroxyurea 1000 MG TABS  JADENU 360 MG tablet  medroxyPROGESTERone (DEPO-PROVERA) 150 MG/ML IM injection  naloxone (NARCAN) 4 MG/0.1ML nasal spray  ondansetron (ZOFRAN) 8 MG tablet  oxyCODONE (OXYCONTIN) 10 MG 12 hr tablet  oxyCODONE IR (ROXICODONE) 15 MG tablet  sertraline (ZOLOFT) 100 MG tablet      Allergies   Allergen Reactions     Fish-Derived Products Hives     Seafood Hives     Contrast Dye      Diagnostic X-Ray Materials      Gadolinium      Family History  Family History   Problem Relation Age of Onset     Sickle Cell Trait Mother      Hypertension Mother      Asthma Mother      Sickle Cell Trait Father      Social History   Social History     Tobacco Use     Smoking status: Never Smoker     Smokeless tobacco: Never Used   Substance Use Topics     Alcohol use: Not Currently     Alcohol/week: 0.0 standard drinks     Drug use: Never      Past  "medical history, past surgical history, medications, allergies, family history, and social history were reviewed with the patient. No additional pertinent items.       Review of Systems   All other systems reviewed and are negative.    A complete review of systems was performed with pertinent positives and negatives noted in the HPI, and all other systems negative.    Physical Exam   BP: 139/89  Pulse: 100  Temp: 98.4  F (36.9  C)  Resp: 20  Height: 162.6 cm (5' 4\")  Weight: 74.8 kg (165 lb)  SpO2: 100 %  Physical Exam  Vitals signs and nursing note reviewed.   Constitutional:       General: She is not in acute distress.     Appearance: She is well-developed. She is not ill-appearing, toxic-appearing or diaphoretic.      Comments: Comfortably resting, lying in bed, NAD, nondiaphoretic, lucid, fully conversant, no  respiratory distress, alert and oriented.     HENT:      Head: Normocephalic and atraumatic.      Mouth/Throat:      Mouth: Mucous membranes are moist.      Pharynx: No oropharyngeal exudate.   Eyes:      General: No scleral icterus.     Pupils: Pupils are equal, round, and reactive to light.   Neck:      Musculoskeletal: Normal range of motion and neck supple.   Cardiovascular:      Rate and Rhythm: Normal rate.      Heart sounds: Normal heart sounds.   Pulmonary:      Effort: Pulmonary effort is normal. No respiratory distress.      Breath sounds: Normal breath sounds.   Abdominal:      Palpations: Abdomen is soft.      Tenderness: There is no abdominal tenderness.   Musculoskeletal:         General: No tenderness.      Right lower leg: No edema.      Left lower leg: No edema.   Skin:     General: Skin is warm and dry.      Coloration: Skin is not pale.      Findings: No erythema or rash.   Neurological:      Mental Status: She is alert and oriented to person, place, and time.         ED Course      Procedures         8:29 PM  The patient was seen and examined by Reddy Contreras MD in Room ED08. "            Results for orders placed or performed during the hospital encounter of 03/27/21   CBC with platelets differential     Status: Abnormal   Result Value Ref Range    WBC 9.1 4.0 - 11.0 10e9/L    RBC Count 2.24 (L) 3.8 - 5.2 10e12/L    Hemoglobin 7.5 (L) 11.7 - 15.7 g/dL    Hematocrit 22.0 (L) 35.0 - 47.0 %    MCV 98 78 - 100 fl    MCH 33.5 (H) 26.5 - 33.0 pg    MCHC 34.1 31.5 - 36.5 g/dL    RDW 24.2 (H) 10.0 - 15.0 %    Platelet Count 286 150 - 450 10e9/L    Diff Method Automated Method     % Neutrophils 43.3 %    % Lymphocytes 38.3 %    % Monocytes 8.2 %    % Eosinophils 7.9 %    % Basophils 2.0 %    % Immature Granulocytes 0.3 %    Nucleated RBCs 5 (H) 0 /100    Absolute Neutrophil 4.0 1.6 - 8.3 10e9/L    Absolute Lymphocytes 3.5 0.8 - 5.3 10e9/L    Absolute Monocytes 0.8 0.0 - 1.3 10e9/L    Absolute Eosinophils 0.7 0.0 - 0.7 10e9/L    Absolute Basophils 0.2 0.0 - 0.2 10e9/L    Abs Immature Granulocytes 0.0 0 - 0.4 10e9/L    Absolute Nucleated RBC 0.4    Comprehensive metabolic panel     Status: Abnormal   Result Value Ref Range    Sodium 136 133 - 144 mmol/L    Potassium 3.2 (L) 3.4 - 5.3 mmol/L    Chloride 108 94 - 109 mmol/L    Carbon Dioxide 23 20 - 32 mmol/L    Anion Gap 5 3 - 14 mmol/L    Glucose 109 (H) 70 - 99 mg/dL    Urea Nitrogen 10 7 - 30 mg/dL    Creatinine 0.57 0.52 - 1.04 mg/dL    GFR Estimate >90 >60 mL/min/[1.73_m2]    GFR Estimate If Black >90 >60 mL/min/[1.73_m2]    Calcium 8.9 8.5 - 10.1 mg/dL    Bilirubin Total 1.8 (H) 0.2 - 1.3 mg/dL    Albumin 4.2 3.4 - 5.0 g/dL    Protein Total 8.5 6.8 - 8.8 g/dL    Alkaline Phosphatase 75 40 - 150 U/L     (H) 0 - 50 U/L     (H) 0 - 45 U/L   HCG qualitative Blood     Status: None   Result Value Ref Range    HCG Qualitative Serum Negative NEG^Negative   Reticulocyte count     Status: Abnormal   Result Value Ref Range    % Retic 11.4 (H) 0.5 - 2.0 %    Absolute Retic 254.5 (H) 25 - 95 10e9/L     Medications   morphine (PF) injection 2  mg (2 mg Intravenous Given 3/27/21 2309)   lactated ringers BOLUS 1,000 mL (0 mLs Intravenous Stopped 3/27/21 2309)   ondansetron (ZOFRAN) injection 8 mg (8 mg Intravenous Given 3/27/21 2058)   diphenhydrAMINE (BENADRYL) capsule 25 mg (25 mg Oral Given 3/27/21 2058)   acetaminophen (TYLENOL) tablet 1,000 mg (1,000 mg Oral Given 3/27/21 2213)   heparin 100 UNIT/ML injection 500 Units (500 Units Intravenous Given 3/27/21 2315)        Assessments & Plan (with Medical Decision Making)   This is a 21-year-old female patient well-known to the emergency room for chronic sickle cell pain crisis.  She is presenting today with her typical pain crisis-like symptoms.  She has no concerns of acute chest and has no fevers otherwise.  She is resting comfortably in the emergency room.  She was provided with her typical pain regiment which included morphine and IV fluids.  Her labs were reviewed and discussed with the patient.  At this time after a few doses of her morphine she had indicated that she is anxious to go home.  She was discharged in the emergency room and instructed to follow-up with her hematologist.  Pt was discharged home/self-care.  PT was provided written discharge instructions. Additionally verbal instructions were given and discussed with patient.  PT was asked to return to the ED immediately for any new or concerning symptoms.  Pt was in agreement, endorsed understanding, and questions were answered.    I have reviewed the nursing notes. I have reviewed the findings, diagnosis, plan and need for follow up with the patient.    Discharge Medication List as of 3/27/2021 11:12 PM          Final diagnoses:   Sickle cell pain crisis (H)       --  I, Nu Small, am serving as a trained medical scribe to document services personally performed by Reddy Contreras MD, based on the provider's statements to me.     I, Reddy Contreras MD, was physically present and have reviewed and verified the accuracy of this  note documented by Nu Small.    Reddy Contreras MD   [facility] EMERGENCY DEPARTMENT  3/27/2021     Reddy Contreras MD  03/29/21 0108

## 2021-03-28 NOTE — ED NOTES
Pt had urinated in bathroom down the cardona. RN had explained order for urine sample. Pt verbalized understanding.

## 2021-03-28 NOTE — ED PROVIDER NOTES
ED Provider Note  [facility]      History     Chief Complaint   Patient presents with     Back Pain     Arm Pain     The history is provided by the patient and medical records.     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 Patient was seen here last night for sickle cell pain crisis. Patient reports diffuse back pain and left upper arm/shoulder pain consistent with past sickle cell pain crises. She states that her pain was controlled when she left the ED around 1 am. She woke up around 8 am today and pain had returned. She tried all home meds, including tylenol, ibuprofen, 15 mg Oxycodone at 8 am without relief. She states that the pain is the same as yesterday and feels similar to her prior sickle cell pain flares. She does have known DVT in her right arm that caused pain and swelling, but otherwise denies extremity swelling. She reports her right arm is not swollen currently and is not painful currently. She denies chest pain, shortness of breath, cough, abdominal pain, leg pain, nausea, vomiting, diarrhea, fever, chills, loss of taste or smell, runny nose, urinary symptoms, and new numbness, tingling, or weakness. She denies any missed Eliquis doses. No saddle anesthesia or bowel or bladder incontinence.     Past Medical History  Past Medical History:   Diagnosis Date     Anemia      Anxiety      Bleeding disorder (H)      Blood clotting disorder (H)      Cerebral infarction (H) 2015     Cognitive developmental delay     low IQ. Please recognize when managing pain and planning with her     Depressive disorder       Hemiplegia and hemiparesis following cerebral infarction affecting right dominant side (H)     right hand contractures     Iron overload due to repeated red blood cell transfusions      Migraines      Multiple subsegmental pulmonary emboli without acute cor pulmonale (H) 02/01/2021     Oppositional defiant behavior      Superficial venous thrombosis of arm, right 03/25/2021     Uncomplicated asthma      Past Surgical History:   Procedure Laterality Date     AS INSERT TUNNELED CV 2 CATH W/O PORT/PUMP       C BREAST REDUCTION (INCLUDES LIPO) TIER 3 Bilateral 04/23/2019     CHOLECYSTECTOMY       IR CVC NON TUNNEL PLACEMENT  04/07/2020     REPAIR TENDON ELBOW Right 10/02/2019    Procedure: Right Elbow Flexor Lengthening, Flexor Pronator Slide Of Wrist and Finger, Thumb Adductor Lengthening;  Surgeon: Anai Franco MD;  Location: [facility] OR     TONSILLECTOMY Bilateral 10/02/2019    Procedure: Bilateral Tonsillectomy;  Surgeon: Farhana Guy MD;  Location: [facility] OR     acetaminophen (TYLENOL) 325 MG tablet  albuterol (PROAIR HFA/PROVENTIL HFA/VENTOLIN HFA) 108 (90 Base) MCG/ACT inhaler  albuterol (PROVENTIL) (2.5 MG/3ML) 0.083% neb solution  Apixaban Starter Pack (ELIQUIS DVT/PE STARTER PACK) 5 MG TBPK  ARIPiprazole (ABILIFY) 2 MG tablet  aspirin (ASPIRIN) 81 MG EC tablet  budesonide-formoterol (SYMBICORT) 160-4.5 MCG/ACT Inhaler  celecoxib (CELEBREX) 100 MG capsule  diclofenac (VOLTAREN) 1 % topical gel  diphenhydrAMINE (BENADRYL) 25 MG capsule  EPINEPHrine (ANY BX GENERIC EQUIV) 0.3 MG/0.3ML injection 2-pack  Hydroxyurea 1000 MG TABS  JADENU 360 MG tablet  medroxyPROGESTERone (DEPO-PROVERA) 150 MG/ML IM injection  naloxone (NARCAN) 4 MG/0.1ML nasal spray  ondansetron (ZOFRAN) 8 MG tablet  oxyCODONE (OXYCONTIN) 10 MG 12 hr tablet  oxyCODONE IR (ROXICODONE) 15 MG tablet  sertraline (ZOLOFT) 100 MG tablet      Allergies   Allergen Reactions     Fish-Derived Products Hives     Seafood Hives     Contrast Dye   "    Diagnostic X-Ray Materials      Gadolinium      Family History  Family History   Problem Relation Age of Onset     Sickle Cell Trait Mother      Hypertension Mother      Asthma Mother      Sickle Cell Trait Father      Social History   Social History     Tobacco Use     Smoking status: Never Smoker     Smokeless tobacco: Never Used   Substance Use Topics     Alcohol use: Not Currently     Alcohol/week: 0.0 standard drinks     Drug use: Never      Past medical history, past surgical history, medications, allergies, family history, and social history were reviewed with the patient. No additional pertinent items.       Review of Systems  A complete review of systems was performed with pertinent positives and negatives noted in the HPI, and all other systems negative.    Physical Exam   BP: (!) 144/86  Pulse: 110  Temp: 98.3  F (36.8  C)  Resp: 16  Height: 162.6 cm (5' 4\")  Weight: 74.8 kg (165 lb)  SpO2: 95 %  Physical Exam  Vitals signs reviewed.   Constitutional:       General: She is not in acute distress.     Appearance: She is well-developed. She is not ill-appearing.   HENT:      Head: Normocephalic and atraumatic.      Mouth/Throat:      Mouth: Mucous membranes are moist.   Eyes:      Extraocular Movements: Extraocular movements intact.      Pupils: Pupils are equal, round, and reactive to light.   Neck:      Musculoskeletal: Normal range of motion and neck supple. No neck rigidity.   Cardiovascular:      Rate and Rhythm: Normal rate and regular rhythm.      Pulses: Normal pulses.      Heart sounds: Normal heart sounds. No murmur.      Comments: Left chest port without erythema, swelling, or tenderness. 2+ radial and DP pulses bilaterally  Pulmonary:      Effort: Pulmonary effort is normal. No respiratory distress.      Breath sounds: Normal breath sounds. No wheezing or rales.   Abdominal:      General: Bowel sounds are normal. There is no distension.      Palpations: Abdomen is soft. There is no mass.      " Tenderness: There is no abdominal tenderness. There is no guarding or rebound.   Musculoskeletal: Normal range of motion.         General: No swelling or tenderness.      Comments: No joint or extremity swelling or erythema. Full range of motion of all joints of the left upper extremity without pain. No midline thoracic or lumbar spine tenderness. Normal range of motion of the back. No bony tenderness of the extremities.    Skin:     General: Skin is warm and dry.      Capillary Refill: Capillary refill takes less than 2 seconds.      Findings: No rash.   Neurological:      Mental Status: She is alert and oriented to person, place, and time.      GCS: GCS eye subscore is 4. GCS verbal subscore is 5. GCS motor subscore is 6.      Cranial Nerves: No cranial nerve deficit.      Sensory: No sensory deficit.      Comments: Chronic contractures and weakness of the right upper and lower extremities that is baseline. Otherwise 5/5 strength in the left upper and lower extremities. 4/5 strength with dorsiflexion, plantar flexion of the right lower extremity. 5/5 hip flexion/extension of the right lower extremity. Sensation intact to light touch in all 4 extremities.    Psychiatric:         Mood and Affect: Mood normal.         ED Course     1:10 PM  The patient was seen and examined by Dr. Maza in Room ED28.   Procedures        The medical record was reviewed and interpreted.  Current labs reviewed and interpreted.  Previous labs reviewed and interpreted.       Results for orders placed or performed during the hospital encounter of 03/28/21   CBC with platelets differential     Status: Abnormal   Result Value Ref Range    WBC 7.3 4.0 - 11.0 10e9/L    RBC Count 2.21 (L) 3.8 - 5.2 10e12/L    Hemoglobin 7.6 (L) 11.7 - 15.7 g/dL    Hematocrit 22.1 (L) 35.0 - 47.0 %     78 - 100 fl    MCH 34.4 (H) 26.5 - 33.0 pg    MCHC 34.4 31.5 - 36.5 g/dL    RDW 24.3 (H) 10.0 - 15.0 %    Platelet Count 302 150 - 450 10e9/L    Diff  Method Manual Differential     % Neutrophils 44.9 %    % Lymphocytes 35.5 %    % Monocytes 4.7 %    % Eosinophils 12.1 %    % Basophils 1.9 %    % Myelocytes 0.9 %    Nucleated RBCs 14 (H) 0 /100    Absolute Neutrophil 3.3 1.6 - 8.3 10e9/L    Absolute Lymphocytes 2.6 0.8 - 5.3 10e9/L    Absolute Monocytes 0.3 0.0 - 1.3 10e9/L    Absolute Eosinophils 0.9 (H) 0.0 - 0.7 10e9/L    Absolute Basophils 0.1 0.0 - 0.2 10e9/L    Absolute Myelocytes 0.1 (H) 0 10e9/L    Absolute Nucleated RBC 1.0     Anisocytosis Marked     Poikilocytosis Slight     Polychromasia Marked     Sickle Cells Moderate     Ovalocytes Marked     Target Cells Moderate     Macrocytes Present     Platelet Estimate Confirming automated cell count    Comprehensive metabolic panel     Status: Abnormal   Result Value Ref Range    Sodium 135 133 - 144 mmol/L    Potassium 4.0 3.4 - 5.3 mmol/L    Chloride 109 94 - 109 mmol/L    Carbon Dioxide 22 20 - 32 mmol/L    Anion Gap 4 3 - 14 mmol/L    Glucose 79 70 - 99 mg/dL    Urea Nitrogen 8 7 - 30 mg/dL    Creatinine 0.55 0.52 - 1.04 mg/dL    GFR Estimate >90 >60 mL/min/[1.73_m2]    GFR Estimate If Black >90 >60 mL/min/[1.73_m2]    Calcium 8.5 8.5 - 10.1 mg/dL    Bilirubin Total 2.2 (H) 0.2 - 1.3 mg/dL    Albumin 4.0 3.4 - 5.0 g/dL    Protein Total 8.1 6.8 - 8.8 g/dL    Alkaline Phosphatase 72 40 - 150 U/L     (H) 0 - 50 U/L     (H) 0 - 45 U/L   INR     Status: Abnormal   Result Value Ref Range    INR 1.26 (H) 0.86 - 1.14   Partial thromboplastin time     Status: Abnormal   Result Value Ref Range    PTT 64 (H) 22 - 37 sec   UA with Microscopic     Status: Abnormal   Result Value Ref Range    Color Urine Light Yellow     Appearance Urine Clear     Glucose Urine Negative NEG^Negative mg/dL    Bilirubin Urine Negative NEG^Negative    Ketones Urine Negative NEG^Negative mg/dL    Specific Gravity Urine 1.010 1.003 - 1.035    Blood Urine Negative NEG^Negative    pH Urine 6.5 5.0 - 7.0 pH    Protein Albumin  Urine Negative NEG^Negative mg/dL    Urobilinogen mg/dL 3.0 (H) 0.0 - 2.0 mg/dL    Nitrite Urine Negative NEG^Negative    Leukocyte Esterase Urine Negative NEG^Negative    Source Clean catch urine     WBC Urine <1 0 - 5 /HPF    RBC Urine 0 0 - 2 /HPF    Bacteria Urine Few (A) NEG^Negative /HPF    Squamous Epithelial /HPF Urine <1 0 - 1 /HPF    Mucous Urine Present (A) NEG^Negative /LPF   HCG qualitative Blood     Status: None   Result Value Ref Range    HCG Qualitative Serum Negative NEG^Negative   Reticulocyte count     Status: Abnormal   Result Value Ref Range    % Retic 10.9 (H) 0.5 - 2.0 %    Absolute Retic 238.3 (H) 25 - 95 10e9/L     Medications   morphine (PF) injection 2 mg (2 mg Intravenous Given 3/28/21 1548)   lactated ringers BOLUS 1,000 mL (0 mLs Intravenous Stopped 3/28/21 1601)   morphine (PF) injection 2 mg (2 mg Intravenous Given 3/28/21 1812)   heparin 100 UNIT/ML injection 500 Units (500 Units Intravenous Given 3/28/21 2006)        Assessments & Plan (with Medical Decision Making)   Patient presents with pain that she reports is typical of her sickle cell pain in her left upper arm as well as diffusely in her back.  She has no midline thoracic or lumbar spine tenderness.  She has no CVA tenderness.  She denies anything being different about this pain from her prior sickle cell pain flares.  She was seen here yesterday for the same pain.  She is on Eliquis due to history of PE that was diagnosed a couple months ago and was also noted recently to have a superficial cephalic vein thrombus as well as a possible right innominate vein thrombus and thus was switched from Xarelto to Eliquis.  She states she has been taking her Eliquis as prescribed.  She denies any chest pain or shortness of breath.  She has no new focal neurologic deficits.  She at baseline has weakness and contractures on her right side from a prior stroke.  She denies any new numbness, tingling, or weakness.  She has no signs of spinal  cord compression on exam.  No saddle anesthesia or new neurologic deficits of the lower extremities.  She does not have any bowel or bladder incontinence.  Thus felt that spinal cord compression as a cause of her symptoms would be unlikely especially as it is diffuse back pain.  Also felt that osteomyelitis or epidural abscess would be unlikely as well without new neurologic deficits and her normal white blood cell count and lack of fevers and diffuse not midline back pain.  She has been afebrile.  She was given morphine per her care plan.  She was also given IV fluids.  She was offered Zofran and Benadryl which she declined.  Laboratory studies were obtained and revealed white blood cell count of 7.3 with a hemoglobin near baseline of 7.6.  Platelets are 302.  CMP is largely near baseline.  ALT and AST are near their baseline. Her bilirubin is 2.2 with yesterday being 1.8.  Her ALT is 156 with an AST of 143 similar to labs performed yesterday.  CMP is otherwise unremarkable.  Urinalysis is unremarkable making UTI or pyelonephritis unlikely.  Diffuse back pain also makes kidney stone unlikely and she has no history of this.  She also has no abdominal pain.  Pregnancy test is negative.  Percent reticulocyte count is near her recent baseline at 10.9.  She does not have any joint swelling or erythema and no swelling of the left upper extremity or right upper extremity on my exam.  She has been compliant with her Eliquis and thus we felt that DVT would be less likely at this time and she also reports left arm pain is similar to prior sickle cell flares.  She has no chest pain or shortness of breath to suggest any worsening of the known PEs and no cough to suggest acute chest syndrome.  On reevaluation I discussed pain management with the patient.  She states that she was feeling better and would really like to go home.  She does not want to be hospitalized.  As she is feeling improved I do feel that this is reasonable.   She does get infusions at the clinic as well.  She was advised to follow-up closely with her hematologist and was given indications for return to the emergency department.  She voiced understanding was comfortable with this plan.  She was discharged home in improved condition.    I have reviewed the nursing notes. I have reviewed the findings, diagnosis, plan and need for follow up with the patient.    Discharge Medication List as of 3/28/2021  7:58 PM          Final diagnoses:   Sickle-cell disease with pain (H)   I, Ashley Johnson, am serving as a trained medical scribe to document services personally performed by Lisa Maza MD, based on the provider's statements to me.     I, Lisa Maza MD, was physically present and have reviewed and verified the accuracy of this note documented by Ashley Johnson.      --  Lisa Maza MD  [facility] EMERGENCY DEPARTMENT  3/28/2021     Lisa Maza MD  03/29/21 8790       Lisa Maza MD  03/29/21 [id number]

## 2021-03-28 NOTE — ED TRIAGE NOTES
Coming in with arm and back pain related to her Sickle Cell pain. Has been taking home medications without relief.     Has a Port left side.

## 2021-03-29 ENCOUNTER — PATIENT OUTREACH (OUTPATIENT)
Dept: CARE COORDINATION | Facility: CLINIC | Age: 21
End: 2021-03-29

## 2021-03-29 ENCOUNTER — TELEPHONE (OUTPATIENT)
Dept: ONCOLOGY | Facility: CLINIC | Age: 21
End: 2021-03-29

## 2021-03-29 ENCOUNTER — HOME INFUSION (PRE-WILLOW HOME INFUSION) (OUTPATIENT)
Dept: PHARMACY | Facility: CLINIC | Age: 21
End: 2021-03-29

## 2021-03-29 ENCOUNTER — INFUSION THERAPY VISIT (OUTPATIENT)
Dept: ONCOLOGY | Facility: CLINIC | Age: 21
End: 2021-03-29
Attending: PHYSICIAN ASSISTANT
Payer: COMMERCIAL

## 2021-03-29 ENCOUNTER — HOSPITAL ENCOUNTER (EMERGENCY)
Facility: CLINIC | Age: 21
Discharge: HOME | End: 2021-03-29
Attending: EMERGENCY MEDICINE
Payer: COMMERCIAL

## 2021-03-29 ENCOUNTER — NURSE TRIAGE (OUTPATIENT)
Dept: NURSING | Facility: CLINIC | Age: 21
End: 2021-03-29

## 2021-03-29 VITALS
OXYGEN SATURATION: 95 % | TEMPERATURE: 98 F | HEART RATE: 107 BPM | SYSTOLIC BLOOD PRESSURE: 134 MMHG | RESPIRATION RATE: 16 BRPM | WEIGHT: 165 LBS | HEIGHT: 64 IN | DIASTOLIC BLOOD PRESSURE: 84 MMHG | BODY MASS INDEX: 28.17 KG/M2

## 2021-03-29 VITALS
TEMPERATURE: 98.4 F | SYSTOLIC BLOOD PRESSURE: 135 MMHG | OXYGEN SATURATION: 96 % | DIASTOLIC BLOOD PRESSURE: 74 MMHG | RESPIRATION RATE: 18 BRPM | HEART RATE: 101 BPM

## 2021-03-29 DIAGNOSIS — R20.2 PARESTHESIA: ICD-10-CM

## 2021-03-29 DIAGNOSIS — D57.00 SICKLE CELL PAIN CRISIS (H): Primary | ICD-10-CM

## 2021-03-29 PROCEDURE — 96374 THER/PROPH/DIAG INJ IV PUSH: CPT

## 2021-03-29 PROCEDURE — 250N000011 HC RX IP 250 OP 636: Performed by: PEDIATRICS

## 2021-03-29 PROCEDURE — 96361 HYDRATE IV INFUSION ADD-ON: CPT

## 2021-03-29 PROCEDURE — 99282 EMERGENCY DEPT VISIT SF MDM: CPT | Performed by: EMERGENCY MEDICINE

## 2021-03-29 PROCEDURE — 96376 TX/PRO/DX INJ SAME DRUG ADON: CPT

## 2021-03-29 PROCEDURE — 250N000011 HC RX IP 250 OP 636: Performed by: PHYSICIAN ASSISTANT

## 2021-03-29 PROCEDURE — 250N000013 HC RX MED GY IP 250 OP 250 PS 637: Performed by: PEDIATRICS

## 2021-03-29 PROCEDURE — 258N000003 HC RX IP 258 OP 636: Performed by: PEDIATRICS

## 2021-03-29 RX ORDER — ONDANSETRON 8 MG/1
8 TABLET, ORALLY DISINTEGRATING ORAL
Status: COMPLETED | OUTPATIENT
Start: 2021-03-29 | End: 2021-03-29

## 2021-03-29 RX ORDER — MORPHINE SULFATE 2 MG/ML
2 INJECTION, SOLUTION INTRAMUSCULAR; INTRAVENOUS
Status: CANCELLED
Start: 2021-03-29

## 2021-03-29 RX ORDER — ONDANSETRON 8 MG/1
8 TABLET, FILM COATED ORAL
Status: CANCELLED
Start: 2021-03-29

## 2021-03-29 RX ORDER — DIPHENHYDRAMINE HCL 25 MG
25 CAPSULE ORAL
Status: CANCELLED
Start: 2021-03-29

## 2021-03-29 RX ORDER — DIPHENHYDRAMINE HCL 25 MG
25 CAPSULE ORAL
Status: COMPLETED | OUTPATIENT
Start: 2021-03-29 | End: 2021-03-29

## 2021-03-29 RX ORDER — HEPARIN SODIUM (PORCINE) LOCK FLUSH IV SOLN 100 UNIT/ML 100 UNIT/ML
5 SOLUTION INTRAVENOUS
Status: CANCELLED | OUTPATIENT
Start: 2021-03-29

## 2021-03-29 RX ORDER — HEPARIN SODIUM,PORCINE 10 UNIT/ML
5 VIAL (ML) INTRAVENOUS
Status: CANCELLED | OUTPATIENT
Start: 2021-03-29

## 2021-03-29 RX ORDER — MORPHINE SULFATE 2 MG/ML
2 INJECTION, SOLUTION INTRAMUSCULAR; INTRAVENOUS
Status: COMPLETED | OUTPATIENT
Start: 2021-03-29 | End: 2021-03-29

## 2021-03-29 RX ORDER — HEPARIN SODIUM (PORCINE) LOCK FLUSH IV SOLN 100 UNIT/ML 100 UNIT/ML
5 SOLUTION INTRAVENOUS
Status: DISCONTINUED | OUTPATIENT
Start: 2021-03-29 | End: 2021-03-29 | Stop reason: HOSPADM

## 2021-03-29 RX ORDER — ONDANSETRON 8 MG/1
8 TABLET, FILM COATED ORAL
Status: DISCONTINUED | OUTPATIENT
Start: 2021-03-29 | End: 2021-03-29 | Stop reason: CLARIF

## 2021-03-29 RX ADMIN — DEXTROSE AND SODIUM CHLORIDE 500 ML: 5; 450 INJECTION, SOLUTION INTRAVENOUS at 14:10

## 2021-03-29 RX ADMIN — ONDANSETRON 8 MG: 8 TABLET, ORALLY DISINTEGRATING ORAL at 14:17

## 2021-03-29 RX ADMIN — MORPHINE SULFATE 2 MG: 2 INJECTION, SOLUTION INTRAMUSCULAR; INTRAVENOUS at 16:16

## 2021-03-29 RX ADMIN — DIPHENHYDRAMINE HYDROCHLORIDE 25 MG: 25 CAPSULE ORAL at 14:18

## 2021-03-29 RX ADMIN — Medication 5 ML: at 16:36

## 2021-03-29 RX ADMIN — MORPHINE SULFATE 2 MG: 2 INJECTION, SOLUTION INTRAMUSCULAR; INTRAVENOUS at 14:11

## 2021-03-29 RX ADMIN — MORPHINE SULFATE 2 MG: 2 INJECTION, SOLUTION INTRAMUSCULAR; INTRAVENOUS at 15:15

## 2021-03-29 ASSESSMENT — MIFFLIN-ST. JEOR: SCORE: 1493.44

## 2021-03-29 ASSESSMENT — ENCOUNTER SYMPTOMS: FACIAL ASYMMETRY: 1

## 2021-03-29 NOTE — PROGRESS NOTES
This is a recent snapshot of the patient's [facility] medical record.  For current drug dose and complete information and questions, call [phone] or In Basket pool, [facility] home infusion ([id number])  CSN Number:  [id number]

## 2021-03-29 NOTE — PROGRESS NOTES
Infusion Nursing Note:  Jennifer Cervantes presents today for IV Fluids/ Pain Meds.    Patient seen by provider today: No   present during visit today: Not Applicable.    Note: Patient arrives to infusion with 9/10 sickle cell pain located in her back and arms, with right arm > left arm. Patient states that goal pain for discharge is 6/10. Denies any chest pain, SOB, fever, chills or cough. IV Morphine given x 3. PRN zofran and benadryl given per patient request. Patient stated pain was at 6/10 at time of discharge and she felt comfortable going home.     Intravenous Access:  Implanted Port.    Treatment Conditions:  Not Applicable.    Post Infusion Assessment:  Patient tolerated infusion without incident.  Blood return noted pre and post infusion.  Site patent and intact, free from redness, edema or discomfort.  No evidence of extravasations.  Access discontinued per protocol.     Discharge Plan:   Patient declined prescription refills.  Discharge instructions reviewed with: Patient.  Patient and/or family verbalized understanding of discharge instructions and all questions answered.  AVS to patient via [employer].  Patient will return 4/13 for labs and visit with RONALDO Hill.   Patient discharged in stable condition accompanied by: self.  Departure Mode: Ambulatory.    Maritza Bautista RN

## 2021-03-29 NOTE — DISCHARGE INSTRUCTIONS
Please make an appointment to follow up with your hematologist and Your Primary Care Provider as soon as possible.    Return for any new or worsening concerns.

## 2021-03-29 NOTE — PROGRESS NOTES
Sickle Cell Outpatient Follow Up Visit      Date of encounter: Mar 26, 2021    Jennifer Cervantes is a 21 year old female who is being seen in clinic for hospital follow up and health maintenance related to SCD      Interval History: Jennifer has had a tougher course in the past month. She has been on qAM Oxycontin with some improvement though she frequently still comes to the ED, more than last year. Some of that seems to be coming from stress at home, as she admits that she does not have support from mom and family, though Mom is her PCA. She adamantly denies any violence or abuse but does not have emotional support. She reports good adherence to her medications including her anticoagulation, so it surprised her to find out she had an clot in her arm (new SVT, unclear age DVT). She was just recently discharged from the hospital and a PIV was required due to giving Desferal. The innominate vein DVT of unclear age could hypothetically be due to any of her 17 total ports that she has had over her life. She denies fever. Pain is reasonable today. No other acute illnesses or sick contacts. She is taking the Jadenu but is looking to be able to do more Desferal. She is taking her HU. No bleeding symptoms. She is holding ASA appropriately.    From a pain standpoint, her big ask is to reconsider BID Oxycontin.    History of Present Illness:  (Initial visit remarks from Dr. 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 She also has significant anxiety and depression with a strong anxiety about transferring to the adult clinic. She usually has pain crises secondary to the anxiety.      --------------------------------  Plan last reviewed with patient:  3/26/2021    Patient background: 20yo F, enjoys movies and kids though there are times where she does not really want to talk to people. Does not have a lot of social support at home.     Sickle Cell Disease History  Primary Hematologist: Perla  PCP: Raul  Genotype: SS  Acute Pain Crisis Treatment:    ER/Acute Care/Infusion Clinic:   o Morphine 2 mg IVP/SC Q1H X 3 doses  o Toradol x1 (avoid through early May due to being on anticoagulation)  o Maintenance IV fluids with LR  o Other: Benadryl PO and Zofran 8 mg IV PRN itching or nausea    Inpatient:  o Opioid: Morphine 2 mg IV Q1H PRN until PCA starts  o PCA plan:   - PCA button dose: Morphine 1 mg  - Lockout: 20 minutes  - Continuous Infusion: consider 1 mg/hr  - One hr max: 4 mg  o Other Medications: Benadryl, Zofran  o If PCA not working, she Has had success with ketamine starting at 4mg/h and advancing only to 6mg/h, as 8mg/h made her feel quite poorly.  o ASA on hold (ok to give celebrex)  o Supportive Care: Docusate, Senna  Chronic Pain Medications:    Home regimen Oxycontin 10 mg q12h, oxycodone 10 mg p.o. q.6 hours p.r.n. breakthrough pain.  She also continues on Celebrex, and Zoloft among other medications.    -Also benefits from mental health visits, acupuncture  Baseline Hemoglobin: 7 g/dl without chronic transfusions  Hydroxyurea use: Yes  H/O blood transfusions: Yes, several (iron overload) Most recent 8/21/20    H/O Transfusion Reactions: no    Antibodies:none  H/O Acute Chest Syndrome: Yes    Last episode: 10/2019     ICU/intubation: unsure  H/O Stroke: Yes (managed with chronic transfusions in the past)  H/O VTE: Yes (2/2021)  H/O Cholecystectomy or Splenectomy: no  H/O Asthma, Pulm HTN, AVN, Leg Ulcers, Nephropathy, Retinopathy, etc: Iron overload, asthma, chronic lung disease, mild developmental delay from early stroke    -------------------------------------------    Sickle Cell Disease Comprehensive Checklist    Bone Health/Avascular  Necrosis Screening/Symptoms (each visit): no new concerns today    Leg Ulcer evaluation (every visit): none    Hypertension (every visit): mildly hypertensive recently    Last ophthalmologic exam: 7/29/20    Last urinalysis for microalbuminuria/CKD (annually): within last year    Last pulmonary evaluation (asthma, AMAN, pulm HTN): within last year    Stroke/silent cerebral infarct Hx (Y/N): Yes TIA ~2014, first event ~age 1 with full stroke and R sided weakness    Last PCP Visit: 8/2020    Vaccines:  o PCV13: 5/13/19  o Pneumovax (PPSV23): 3/04, 10/09, 7/12/19 (next due 7/2024)  o Menactra: 4/2010, 9/2015 (MCV overdue Sept 2020)  o Influenza: got 20-21 vaccine per self report    Audiology (chelation): done 6/2020, normal    Past Medical History:  Past Medical History:   Diagnosis Date     Anemia      Anxiety      Bleeding disorder (H)      Blood clotting disorder (H)      Cerebral infarction (H) 2015     Cognitive developmental delay     low IQ. 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 (H)     right hand contractures     Iron overload due to repeated red blood cell transfusions      Migraines      Multiple subsegmental pulmonary emboli without acute cor pulmonale (H) 02/01/2021     Oppositional defiant behavior      Uncomplicated asthma        Past Surgical History:  Past Surgical History:   Procedure Laterality Date     AS INSERT TUNNELED CV 2 CATH W/O PORT/PUMP       C BREAST REDUCTION (INCLUDES LIPO) TIER 3 Bilateral 04/23/2019     CHOLECYSTECTOMY       IR CVC NON TUNNEL PLACEMENT  04/07/2020     REPAIR TENDON ELBOW Right 10/02/2019    Procedure: Right Elbow Flexor Lengthening, Flexor Pronator Slide Of Wrist and Finger, Thumb Adductor Lengthening;  Surgeon: Anai Franco MD;  Location: [facility] OR     TONSILLECTOMY Bilateral 10/02/2019    Procedure: Bilateral Tonsillectomy;   "Surgeon: Farhana Guy MD;  Location: [facility] OR       Family History:   Family History   Problem Relation Age of Onset     Sickle Cell Trait Mother      Hypertension Mother      Asthma Mother      Sickle Cell Trait Father        Social History:  Social History     Socioeconomic History     Marital status: Single     Spouse name: Not on file     Number of children: Not on file     Years of education: Not on file     Highest education level: Not on file   Occupational History     Not on file   Social Needs     Financial resource strain: Not on file     Food insecurity     Worry: Not on file     Inability: Not on file     Transportation needs     Medical: Not on file     Non-medical: Not on file   Tobacco Use     Smoking status: Never Smoker     Smokeless tobacco: Never Used   Substance and Sexual Activity     Alcohol use: Not Currently     Alcohol/week: 0.0 standard drinks     Drug use: Never     Sexual activity: Not Currently     Partners: Male     Birth control/protection: Other   Lifestyle     Physical activity     Days per week: Not on file     Minutes per session: Not on file     Stress: Not on file   Relationships     Social connections     Talks on phone: Not on file     Gets together: Not on file     Attends [employer] service: Not on file     Active member of club or organization: Not on file     Attends meetings of clubs or organizations: Not on file     Relationship status: Not on file     Intimate partner violence     Fear of current or ex partner: Not on file     Emotionally abused: Not on file     Physically abused: Not on file     Forced sexual activity: Not on file   Other Topics Concern     Parent/sibling w/ CABG, MI or angioplasty before 65F 55M? Not Asked   Social History Narrative    Lives with mom and extended family but \"toxic environment\" per her report. She would like to move out but cannot financially do so. She has minimal support at home despite her significant SCD comorbidities and " cognitive delay from stroke.       Medications:  Current Outpatient Medications   Medication     acetaminophen (TYLENOL) 325 MG tablet     albuterol (PROAIR HFA/PROVENTIL HFA/VENTOLIN HFA) 108 (90 Base) MCG/ACT inhaler     albuterol (PROVENTIL) (2.5 MG/3ML) 0.083% neb solution     Apixaban Starter Pack (ELIQUIS DVT/PE STARTER PACK) 5 MG TBPK     ARIPiprazole (ABILIFY) 2 MG tablet     aspirin (ASPIRIN) 81 MG EC tablet     budesonide-formoterol (SYMBICORT) 160-4.5 MCG/ACT Inhaler     celecoxib (CELEBREX) 100 MG capsule     diclofenac (VOLTAREN) 1 % topical gel     diphenhydrAMINE (BENADRYL) 25 MG capsule     Hydroxyurea 1000 MG TABS     JADENU 360 MG tablet     medroxyPROGESTERone (DEPO-PROVERA) 150 MG/ML IM injection     naloxone (NARCAN) 4 MG/0.1ML nasal spray     ondansetron (ZOFRAN) 8 MG tablet     oxyCODONE (OXYCONTIN) 10 MG 12 hr tablet     oxyCODONE IR (ROXICODONE) 15 MG tablet     sertraline (ZOLOFT) 100 MG tablet     EPINEPHrine (ANY BX GENERIC EQUIV) 0.3 MG/0.3ML injection 2-pack     No current facility-administered medications for this visit.      Facility-Administered Medications Ordered in Other Visits   Medication     diphenhydrAMINE (BENADRYL) capsule 25 mg     ondansetron (ZOFRAN) injection 8 mg         Physical Exam:   BP (!) 149/82   Pulse 118   Temp 98  F (36.7  C) (Oral)   Wt 73.9 kg (162 lb 14.4 oz)   SpO2 98%   BMI 27.96 kg/m       GEN: young  female, appears comfortable in clinic, seems to be in a good mood  HEENT: no icterus, MMM  CV: RRR, no murmurs  NECK: no visible asymmetry  RESP: speaking in full sentences, no increased work of breathing, clear to auscultation  SKIN: no bruising noted  MSK: contracture at right wrist (chronic), now with slight edema compared to previous visits but non painful to touch  NEURO: alert and oriented      Labs:   Results for HIMANSHU AL APTY (MRN [id number]) as of 3/28/2021 20:26   Ref. Range 3/25/2021 12:41   Sodium Latest Ref Range: 133 -  144 mmol/L 138   Potassium Latest Ref Range: 3.4 - 5.3 mmol/L 3.6   Chloride Latest Ref Range: 94 - 109 mmol/L 112 (H)   Carbon Dioxide Latest Ref Range: 20 - 32 mmol/L 22   Urea Nitrogen Latest Ref Range: 7 - 30 mg/dL 7   Creatinine Latest Ref Range: 0.52 - 1.04 mg/dL 0.47 (L)   GFR Estimate Latest Ref Range: >60 mL/min/1.73_m2 >90   GFR Estimate If Black Latest Ref Range: >60 mL/min/1.73_m2 >90   Calcium Latest Ref Range: 8.5 - 10.1 mg/dL 9.0   Anion Gap Latest Ref Range: 3 - 14 mmol/L 4   Albumin Latest Ref Range: 3.4 - 5.0 g/dL 4.1   Protein Total Latest Ref Range: 6.8 - 8.8 g/dL 8.4   Bilirubin Total Latest Ref Range: 0.2 - 1.3 mg/dL 2.0 (H)   Alkaline Phosphatase Latest Ref Range: 40 - 150 U/L 72   ALT Latest Ref Range: 0 - 50 U/L 111 (H)   AST Latest Ref Range: 0 - 45 U/L 95 (H)   Glucose Latest Ref Range: 70 - 99 mg/dL 82   WBC Latest Ref Range: 4.0 - 11.0 10e9/L 6.2   Hemoglobin Latest Ref Range: 11.7 - 15.7 g/dL 7.4 (L)   Hematocrit Latest Ref Range: 35.0 - 47.0 % 21.9 (L)   Platelet Count Latest Ref Range: 150 - 450 10e9/L 282   RBC Count Latest Ref Range: 3.8 - 5.2 10e12/L 2.21 (L)   MCV Latest Ref Range: 78 - 100 fl 99   MCH Latest Ref Range: 26.5 - 33.0 pg 33.5 (H)   MCHC Latest Ref Range: 31.5 - 36.5 g/dL 33.8   RDW Latest Ref Range: 10.0 - 15.0 % 23.7 (H)   Diff Method Unknown Automated Method   % Neutrophils Latest Units: % 46.1   % Lymphocytes Latest Units: % 37.9   % Monocytes Latest Units: % 7.9   % Eosinophils Latest Units: % 5.8   % Basophils Latest Units: % 2.1   % Immature Granulocytes Latest Units: % 0.2   Nucleated RBCs Latest Ref Range: 0 /100 4 (H)   Absolute Neutrophil Latest Ref Range: 1.6 - 8.3 10e9/L 2.9   Absolute Lymphocytes Latest Ref Range: 0.8 - 5.3 10e9/L 2.3   Absolute Monocytes Latest Ref Range: 0.0 - 1.3 10e9/L 0.5   Absolute Eosinophils Latest Ref Range: 0.0 - 0.7 10e9/L 0.4   Absolute Basophils Latest Ref Range: 0.0 - 0.2 10e9/L 0.1   Abs Immature Granulocytes Latest Ref  Range: 0 - 0.4 10e9/L 0.0   Absolute Nucleated RBC Unknown 0.2   % Retic Latest Ref Range: 0.5 - 2.0 % 11.4 (H)   Absolute Retic Latest Ref Range: 25 - 95 10e9/L 251.9 (H)       Imaging:   Clinical history:  21-year-old woman with sickle cell disease. CMR to assess for cardiac siderosis.      Comparison CMR: none     1. The left ventricle is normal in cavity size and wall thickness. There are no regional wall motion  abnormalities. The global systolic function is normal. The LVEF is 55%.     2. The right ventricle is normal in cavity size. The global systolic function is normal. The RVEF is 47%.      3. Both atria are normal in size.     4. There is no significant valvular disease.      5. There is no pericardial effusion.     6. The average T2* value is 26 ms (normal).      CONCLUSIONS:   Normal biventricular function with no evidence of cardiac siderosis.     Ferriscan still pending.     ----------    Assessment:  Jennifer Cervantes is a 21 year old female with HbSS. Her disease has been complicated by stroke leading to significant cognitive delay and right sided hemiparesis, high anxiety leading to frequent pain crises on top of chronic pain syndrome, poor social structure at home with no real support, and iron overload secondary to repeated transfusions. She has had significant stressors at home and her 2021 has been marked by a long admission and separately, PE and SVT + DVT in RUE of unclear chronicity.    Major Topics of the Visit:  1. Pain Management: We discussed our current strategy. We will go back to BID Oxycontin (has been on qAM dosing for a month or more) but stay at the 10 mg dosing for now instead of the 15 mg dosing. She is still using naproxen as well but more PRN. I suspect her frequent ED visits without the need for admission (excepting the recent admission this month) are somewhat driven by stress at home, which is obviously poorly responsive to analgesic management. She said she is still talking  with a counselor but is more strongly considering moving out of her mom's house, which adds stress. We will continue to push for more home support.  2. Iron overload/Pharmacy Issues: She has been on Jadenu for a few months and got Desferal during her admission (never got a chance to do it in clinic). We are trying to work with [facility] to do weekend high dose Desferal x48 hours but still have not figured out the orders quite yet.  3. High RBC turnover: Jennifer really has a high degree of RBC turnover, more than most patients I have seen. Oxybryta led to more frequent pain episodes (chest pain, which was new) and did not change the RBC turnover so it was stopped in December.  4. Pulmonary Emboli + new RUE DVT (innominate vein) of unclear chronicity + new symptomatic R cephalic SVT this week: Switched to apixaban with full starter pack given the new clots. No obvious trigger. I will treat the SVT primarily given the symptoms, and the timing comes out for 6 weeks to be around the time she would have finished the rivaroxaban. We will re-address the potential for long-term anticoagulation in a month. Holding aspirin for 3 months.  5. Follow up: 4 weeks with me.     Review of external notes as documented above   Review of the result(s) of each unique test - CBC, retic, CMP, ferritin, cardiac MRI, Ferriscan  Diagnosis or treatment significantly limited by social determinants of health - sickle cell disease, racial inequity, difficult social situation at home    45 min spent on the date of the encounter in chart review, patient visit, review of tests, documentation and/or discussion with other providers about the issues documented above.       Eric Duncan MD  Hematologist  Division of Hematology, Oncology, and Transplantation  [facility]  [facility]  Pager: [phone]

## 2021-03-29 NOTE — TELEPHONE ENCOUNTER
[facility] Telephone Triage Note    The following symptoms were reported:   Typical  Description:            Onset:  This morning    Location: lower back and both arms, right hurts more.   Character: sharp and stabbing           Intensity: 9/10    Accompanying Signs & Symptoms:  Continues to have right arm swelling from last week.     Chest Pain:  denies     Shortness of Breath:  denies     Fever:  denies     Chills:  denies   Cough/sore throat:  denies  Other:  denies    Therapies Tried and outcome: Medications taken around 8:00am. Oxy, celebrex, tylenol, ibuprofen    Improved by: nothing    Pt is aware if unable to get in today will try to hold off until tomorrow rather than go to ER again.    The following provider was consulted:   10:20am per Patricia vargas for IVF/Pain no labs needed. Check with patient that switched blood thinner meds to Eliquis from last week.        Patient instructions and/or follow up:    Called and relayed information to Pt, who verbalized understanding for 1;30pm IVF/Pain today.     Scheduling notified.     Called and relayed information to Pt, who verbalized understanding and confirmed is taking Eliquis as prescribed.    Instructed patient to seek care immediately for worsening symptoms, including: fever, chest pain, shortness of breath, dizziness.

## 2021-03-29 NOTE — PROGRESS NOTES
Social Work Follow-Up Encounter Visit  Oncology Clinic    Data/Intervention:  Patient Name:  Jennifer Cervantes  /Age:  2000 (21 year old)    Reason for Follow-Up:  Transportation supports     Collaborated With:    -Patient  -[facility]  -[facility] staff      Resources Provided:  Ride coordinated    Assessment:  Patient's care team communicated patient needed assistance with setting up a ride for today's appointment. SW called [facility], [facility] partners sated they would set up the ride, but could not guarantee patient would arrive on time to appointment as appointment was in an hour from time of call. SW communicated understanding of this. Ride was arranged. SW communicated ride arrangements and [facility]'s concern about getting patient to appointment on time to both patient and patient's care team.     Plan:  Previously provided patient/family with writer's contact information and availability.   SW will remain available as needed.     Corry GONZALEZ, NATALIA  - Oncology  Phone : [phone]  Pager: [phone]

## 2021-03-29 NOTE — PATIENT INSTRUCTIONS
Contact Numbers  [facility]: [phone] (for symptom and scheduling needs)    Please call the [facility] Triage line if you experience a temperature greater than or equal to 100.4, shaking chills, have uncontrolled nausea, vomiting and/or diarrhea, dizziness, shortness of breath, chest pain, bleeding, unexplained bruising, or if you have any other new/concerning symptoms, questions or concerns.     If you are having any concerning symptoms or wish to speak to a provider before your next infusion visit, please call your care coordinator or triage to notify them so we can adequately serve you.     If you need a refill on a narcotic prescription or other medication, please call triage before your infusion appointment.          March 2021 Sunday Monday Tuesday Wednesday Thursday Friday Saturday        1    Admission   8:11 PM   Raul Diaz DO   [facility] Emergency Department   (Discharge: 3/2/2021) 2     3     4  Happy Birthday!    Admission   2:19 PM   Alhaji Winters MD   [facility] [address] [place]   (Discharge: 3/16/2021) 5     6    XR CHEST PORT 1 VIEW   8:40 AM   (20 min.)   [id number]   [facility]   7     8    XR CHEST 2 VIEWS   8:05 AM   (15 min.)   [id number]   [facility] 9     10    XR CHEST 2 VIEWS  11:40 AM   (15 min.)   [id number]   [facility] 11     12     13       14     15     16     17    Admission   8:49 PM   Huang Domingo MD   [facility] Emergency Department   (Discharge: 3/18/2021)    US LWR EXT VENOUS DUPLEX BILAT  10:50 PM   (40 min.)   [email or web address]   [facility] 18    [facility] ONC INFUSION 120   3:00 PM   (120 min.)   [facility] ONCOLOGY INFUSION   [facility] 19    Admission  12:07 PM   Ifeanyi Gaitan MD   [facility] Emergency Department   (Discharge: 3/19/2021) 20    Admission  11:20 AM   Pedro Ryan MD   [facility]  Emergency Department   (Discharge: 3/20/2021)   21    Admission  11:16 AM   Cornelia Malloy MD   [facility] Emergency Department   (Discharge: 3/21/2021) 22    [facility] ONC INFUSION 120   1:00 PM   (120 min.)   UC ONCOLOGY INFUSION   [facility] 23    RETURN  10:05 AM   (50 min.)   Patricia Hodge PA-C   [facility]    [facility] ONC INFUSION 120  11:30 AM   (120 min.)   UC ONCOLOGY INFUSION   [facility] 24    [facility] ONC INFUSION 120  10:30 AM   (120 min.)   UC ONCOLOGY INFUSION   [facility] 25    MR MYOCARDIUM WO   9:30 AM   (105 min.)   [id number]   [facility]    US VENOUS  10:00 AM   (30 min.)   UUUS4   [facility]    MR ABDOMEN WO  11:00 AM   (45 min.)   [id number]   [facility]    LAB CENTRAL  12:15 PM   (15 min.)   [facility] DRAW   [facility]    [facility] ONC INFUSION 120   1:00 PM   (120 min.)   UC ONCOLOGY INFUSION   [facility]    RETURN   1:05 PM   (50 min.)   Patricia Hodge PA-C   [facility] 26    UMP RETURN  12:45 PM   (30 min.)   Eric Duncan MD   [facility]    [facility] ONC INFUSION 120   1:30 PM   (120 min.)   UC ONCOLOGY INFUSION   [facility] 27    Admission   8:20 PM   [facility] Emergency Department   (Discharge: 3/27/2021)   28    Admission  12:28 PM   [facility] Department   (Discharge: 3/28/2021) 29    [facility] ONC INFUSION 120   1:30 PM   (120 min.)   UC ONCOLOGY INFUSION   [facility] 30     31    UMP RETURN GENERAL   8:25 AM   (20 min.)   Usama Mcclendon OD   [facility] [place]                            April 2021 Sunday Monday Tuesday Wednesday Thursday Friday Saturday                        1     2     3       4     5     6    [facility] RETURN  10:15 AM   (30 min.)   Suraj Case MD   [facility] [place] 7     8     9     10       11     12     13    LAB PERIPHERAL   9:45 AM   (15 min.)   [facility]   [facility]    RETURN  10:05 AM   (50 min.)   Andrei Machado PA   [facility] 14     15     16     17       18     19     20     21     22     23     24       25     26     27     28     29     30                          Lab Results:  No results found for this or any previous visit (from the past 12 hour(s)).

## 2021-03-30 ENCOUNTER — TELEPHONE (OUTPATIENT)
Dept: ONCOLOGY | Facility: CLINIC | Age: 21
End: 2021-03-30

## 2021-03-30 DIAGNOSIS — D57.00 SICKLE CELL PAIN CRISIS (H): ICD-10-CM

## 2021-03-30 RX ORDER — OXYCODONE HCL 10 MG/1
10 TABLET, FILM COATED, EXTENDED RELEASE ORAL EVERY 12 HOURS
Qty: 60 TABLET | Refills: 0 | Status: ON HOLD | OUTPATIENT
Start: 2021-03-30 | End: 2021-05-11

## 2021-03-30 RX ORDER — OXYCODONE HYDROCHLORIDE 15 MG/1
15 TABLET ORAL EVERY 6 HOURS PRN
Qty: 60 TABLET | Refills: 0 | Status: SHIPPED | OUTPATIENT
Start: 2021-03-30 | End: 2021-04-12

## 2021-03-30 NOTE — TELEPHONE ENCOUNTER
Pt called in to triage requesting IVF pain meds for back  pain rated 10 and started this morning. Patient took has taken prn oxycotin (1 tablet), oxycodone (1 tablet), ibuprofen (200mg), celebrex (1 tablet), tylenol (2 tabs).    Patient denied sob, chest pain or other symptoms. Pt's last infusion was 3/30, last clinic visit 3/30 with follow-up on 4/13 with Andrei Machado. Patient asked for refills on her oxycodone and oxycontin. Per Dr Duncan, patient is approved to come to clinic and refills are approved.

## 2021-03-30 NOTE — ED TRIAGE NOTES
Pt presents to triage with c/o right sided facial numbness that began 2 hours ago. Triage nurse advised pt to come to ER to be evaluated for stroke. Was at infusion center earlier today. Pt found out last week that there are blood clots in RUE. Last week, pt was taken off of xarelto and placed on eliquis. Currently rating pain 7/10 in lower back and right arm.

## 2021-03-30 NOTE — TELEPHONE ENCOUNTER
In ED yesterday - received pain meds & fluids. 20-30 mins ago, R side of her face started feeling weird. H/o sickle cell and has had a L sided stroke in the past. Family states there's no facial droop, but her face feels gene weird. States her face feels droopy on the R side.    Per protocol advised ED evaluation.    Елена Bryan RN on 3/29/2021 at 8:20 PM    Reason for Disposition    [1] Weakness (i.e., paralysis, loss of muscle strength) of the face, arm / hand, or leg / foot on one side of the body AND [2] sudden onset AND [3] present now    Additional Information    Negative: [1] SEVERE weakness (i.e., unable to walk or barely able to walk, requires support) AND [2] new onset or worsening    Protocols used: NEUROLOGIC DEFICIT-A-AH

## 2021-03-30 NOTE — ED NOTES
Dr. Santiago notified of pt in triage and symptoms. Advised pt does not need to come back right away.

## 2021-03-30 NOTE — ED PROVIDER NOTES
[place] EMERGENCY DEPARTMENT ([facility])  March 29, 2021  History     Chief Complaint   Patient presents with     Numbness     The history is provided by the patient and medical records.     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  Patient reports about 2 hours prior to arrival she noticed a tingly sensation on the right side of her face.  She states she thought the right side felt somewhat droopy, however, both her mother and sister did not notice any facial asymmetry.  Patient states the tingling sensation is located in the middle of the right side of her face and a little into her right forehead.  She states it has not improved since onset.  Patient states she took tylenol and ibuprofen for her symptoms.  Patient notes she had a left sided stroke with right sided deficits when she was a child.       PAST MEDICAL HISTORY:   Past Medical History:   Diagnosis Date     Anemia      Anxiety      Bleeding disorder (H)      Blood clotting disorder (H)      Cerebral infarction (H) 2015     Cognitive developmental delay     low IQ. 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 (H)     right hand contractures     Iron overload due to repeated red blood cell transfusions      Migraines      Multiple subsegmental pulmonary emboli without acute cor pulmonale (H) 02/01/2021     Oppositional defiant behavior      Superficial venous  thrombosis of arm, right 03/25/2021     Uncomplicated asthma        PAST SURGICAL HISTORY:   Past Surgical History:   Procedure Laterality Date     AS INSERT TUNNELED CV 2 CATH W/O PORT/PUMP       C BREAST REDUCTION (INCLUDES LIPO) TIER 3 Bilateral 04/23/2019     CHOLECYSTECTOMY       INSERT PORT VASCULAR ACCESS Left 4/21/2021    Procedure: INSERTION, VASCULAR ACCESS PORT (NOT SURE ON SIDE UNTIL REMOVAL);  Surgeon: Rajan More MD;  Location: [facility] OR     IR CHEST PORT PLACEMENT > 5 YRS OF AGE  4/21/2021     IR CVC NON TUNNEL PLACEMENT  04/07/2020     IR PORT REMOVAL LEFT  4/21/2021     REMOVE PORT VASCULAR ACCESS Left 4/21/2021    Procedure: REMOVAL, VASCULAR ACCESS PORT LEFT;  Surgeon: Rajan More MD;  Location: [facility] OR     REPAIR TENDON ELBOW Right 10/02/2019    Procedure: Right Elbow Flexor Lengthening, Flexor Pronator Slide Of Wrist and Finger, Thumb Adductor Lengthening;  Surgeon: Anai Franco MD;  Location: [facility] OR     TONSILLECTOMY Bilateral 10/02/2019    Procedure: Bilateral Tonsillectomy;  Surgeon: Farhana Guy MD;  Location: [facility] OR       Past medical history, past surgical history, medications, and allergies were reviewed with the patient. Additional pertinent items: None    FAMILY HISTORY:   Family History   Problem Relation Age of Onset     Sickle Cell Trait Mother      Hypertension Mother      Asthma Mother      Sickle Cell Trait Father        SOCIAL HISTORY:   Social History     Tobacco Use     Smoking status: Never Smoker     Smokeless tobacco: Never Used   Substance Use Topics     Alcohol use: Not Currently     Alcohol/week: 0.0 standard drinks     Social history was reviewed with the patient. Additional pertinent items: None      Discharge Medication List as of 3/29/2021 10:44 PM      CONTINUE these medications which have NOT CHANGED    Details   acetaminophen (TYLENOL) 325 MG tablet Take 2 tablets (650 mg) by mouth every 6 hours as needed for mild pain, Disp-120 tablet,  R-3, E-Prescribe      albuterol (PROAIR HFA/PROVENTIL HFA/VENTOLIN HFA) 108 (90 Base) MCG/ACT inhaler Inhale 2 puffs into the lungs every 6 hours as needed for shortness of breath / dyspnea or wheezing, Disp-8.5 g, R-3, E-PrescribePharmacy may dispense brand covered by insurance (Proair, or proventil or ventolin or generic albuterol inhaler)      albuterol (PROVENTIL) (2.5 MG/3ML) 0.083% neb solution Take 1 vial (2.5 mg) by nebulization every 6 hours as needed for shortness of breath / dyspnea or wheezing, Disp-12 mL, R-4, E-Prescribe      Apixaban Starter Pack (ELIQUIS DVT/PE STARTER PACK) 5 MG TBPK Take 10 mg by mouth 2 times daily for 7 days, THEN 5 mg 2 times daily for 23 days., Disp-74 each, R-0, E-Prescribe      ARIPiprazole (ABILIFY) 2 MG tablet Take 1 tablet (2 mg) by mouth daily, Disp-30 tablet, R-3, E-Prescribe      aspirin (ASPIRIN) 81 MG EC tablet Take 1 tablet (81 mg) by mouth daily . HOLD while on Xarelto, Disp-90 tablet, R-3, Historical      budesonide-formoterol (SYMBICORT) 160-4.5 MCG/ACT Inhaler Inhale 2 puffs into the lungs 2 times daily, Disp-10.2 g, R-3, E-Prescribe      celecoxib (CELEBREX) 100 MG capsule Take 1 capsule (100 mg) by mouth 2 times daily, Disp-60 capsule, R-3, E-Prescribe      diclofenac (VOLTAREN) 1 % topical gel Apply 4 g topically 4 times daily as needed for moderate pain Apply to back, legs, and arms for pain, Disp-150 g, R-3, E-Prescribe      diphenhydrAMINE (BENADRYL) 25 MG capsule Take 1-2 capsules (25-50 mg) by mouth nightly as needed for sleep, Disp-60 capsule, R-3, E-Prescribe      EPINEPHrine (ANY BX GENERIC EQUIV) 0.3 MG/0.3ML injection 2-pack Inject 0.3 mLs (0.3 mg) into the muscle as needed for anaphylaxis, Disp-1 each, R-1, E-Prescribe      Hydroxyurea 1000 MG TABS Take 2,000 mg by mouth daily, Disp-60 tablet, R-3, E-Prescribe      JADENU 360 MG tablet Take 4 tablets (1,440 mg) by mouth every evening, Disp-30 tablet, R-0, SHIELA, E-Prescribe      medroxyPROGESTERone  "(DEPO-PROVERA) 150 MG/ML IM injection Inject 150 mg into the muscle, Historical      naloxone (NARCAN) 4 MG/0.1ML nasal spray Spray 1 spray (4 mg) into one nostril alternating nostrils once as needed for opioid reversal every 2-3 minutes until assistance arrives, Disp-0.2 mL,R-1, E-Prescribe      ondansetron (ZOFRAN) 8 MG tablet Historical      sertraline (ZOLOFT) 100 MG tablet Take 2 tablets (200 mg) by mouth daily, Disp-180 tablet, R-3, E-Prescribe      oxyCODONE (OXYCONTIN) 10 MG 12 hr tablet Take 1 tablet (10 mg) by mouth every morning, Disp-30 tablet, R-0, E-Prescribe      oxyCODONE IR (ROXICODONE) 15 MG tablet Take 1 tablet (15 mg) by mouth every 6 hours as needed for severe pain, Disp-60 tablet, R-0, E-Prescribe                Allergies   Allergen Reactions     Contrast Dye      Hives and breathing issues     Fish-Derived Products Hives     Seafood Hives     Diagnostic X-Ray Materials      Gadolinium         Review of Systems   Neurological: Positive for facial asymmetry (subjective R side).   All other systems reviewed and are negative.    A complete review of systems was performed with pertinent positives and negatives noted in the HPI, and all other systems negative.    Physical Exam   BP: (!) 155/80  Pulse: 115  Temp: 98.8  F (37.1  C)  Resp: 16  Height: 162.6 cm (5' 4\")  Weight: 74.8 kg (165 lb)  SpO2: 94 %      Physical Exam  Constitutional:       General: She is not in acute distress.     Appearance: She is not diaphoretic.   HENT:      Head: Atraumatic.      Mouth/Throat:      Pharynx: No oropharyngeal exudate.   Eyes:      General: No scleral icterus.     Pupils: Pupils are equal, round, and reactive to light.   Cardiovascular:      Heart sounds: Normal heart sounds.   Pulmonary:      Effort: No respiratory distress.      Breath sounds: Normal breath sounds.   Abdominal:      General: Bowel sounds are normal.      Palpations: Abdomen is soft.      Tenderness: There is no abdominal tenderness. "   Musculoskeletal:         General: No tenderness.   Skin:     General: Skin is warm.      Findings: No rash.   Neurological:      Comments: Subjective tingling around V2 facial nerve         ED Course   9:42 PM  The patient was seen and examined by Jarrell Santiago DO in Room ED10.       Procedures                   No results found for this or any previous visit (from the past 24 hour(s)).  Medications - No data to display          Assessments & Plan (with Medical Decision Making)       21-year-old female here with numbness to V2, unlikely stroke but after discussing case with neurology, recommended MRI, will oblige.  MRI was negative.  Patient was stable to be discharged home.    I have reviewed the nursing notes.    I have reviewed the findings, diagnosis, plan and need for follow up with the patient.    Discharge Medication List as of 3/29/2021 10:44 PM          Final diagnoses:   Paresthesia   Anna NEWMAN, am serving as a trained medical scribe to document services personally performed by Jarrell Santiago DO, based on the provider's statements to me.     Jarrell NEWMAN DO, was physically present and have reviewed and verified the accuracy of this note documented by Anna Robison.     --  Jarrell Santiago DO  3/29/2021   [facility] EMERGENCY DEPARTMENT     Jarrell Santiago MD  04/30/21 0602

## 2021-03-31 ENCOUNTER — INFUSION THERAPY VISIT (OUTPATIENT)
Dept: INFUSION THERAPY | Facility: CLINIC | Age: 21
End: 2021-03-31
Attending: PHYSICIAN ASSISTANT
Payer: COMMERCIAL

## 2021-03-31 ENCOUNTER — PATIENT OUTREACH (OUTPATIENT)
Dept: ONCOLOGY | Facility: CLINIC | Age: 21
End: 2021-03-31

## 2021-03-31 ENCOUNTER — HOME INFUSION (PRE-WILLOW HOME INFUSION) (OUTPATIENT)
Dept: PHARMACY | Facility: CLINIC | Age: 21
End: 2021-03-31

## 2021-03-31 ENCOUNTER — OFFICE VISIT (OUTPATIENT)
Dept: OPHTHALMOLOGY | Facility: CLINIC | Age: 21
End: 2021-03-31
Payer: COMMERCIAL

## 2021-03-31 ENCOUNTER — TELEPHONE (OUTPATIENT)
Dept: ONCOLOGY | Facility: CLINIC | Age: 21
End: 2021-03-31

## 2021-03-31 VITALS
RESPIRATION RATE: 20 BRPM | OXYGEN SATURATION: 96 % | SYSTOLIC BLOOD PRESSURE: 132 MMHG | DIASTOLIC BLOOD PRESSURE: 87 MMHG | TEMPERATURE: 98.5 F | HEART RATE: 118 BPM

## 2021-03-31 DIAGNOSIS — D57.00 SICKLE CELL PAIN CRISIS (H): Primary | ICD-10-CM

## 2021-03-31 DIAGNOSIS — H52.4 ACCOMMODATIVE INSUFFICIENCY: Primary | ICD-10-CM

## 2021-03-31 PROCEDURE — 96376 TX/PRO/DX INJ SAME DRUG ADON: CPT

## 2021-03-31 PROCEDURE — 92012 INTRM OPH EXAM EST PATIENT: CPT | Performed by: OPTOMETRIST

## 2021-03-31 PROCEDURE — 250N000011 HC RX IP 250 OP 636: Performed by: PEDIATRICS

## 2021-03-31 PROCEDURE — 96374 THER/PROPH/DIAG INJ IV PUSH: CPT

## 2021-03-31 PROCEDURE — 258N000003 HC RX IP 258 OP 636: Performed by: PEDIATRICS

## 2021-03-31 RX ORDER — ONDANSETRON 8 MG/1
8 TABLET, FILM COATED ORAL
Status: DISCONTINUED | OUTPATIENT
Start: 2021-03-31 | End: 2021-03-31

## 2021-03-31 RX ORDER — DIPHENHYDRAMINE HCL 25 MG
25 CAPSULE ORAL
Status: CANCELLED
Start: 2021-03-31

## 2021-03-31 RX ORDER — HEPARIN SODIUM,PORCINE 10 UNIT/ML
5 VIAL (ML) INTRAVENOUS
Status: CANCELLED | OUTPATIENT
Start: 2021-03-31

## 2021-03-31 RX ORDER — HEPARIN SODIUM,PORCINE 10 UNIT/ML
5 VIAL (ML) INTRAVENOUS
Status: CANCELLED | OUTPATIENT
Start: 2021-04-02

## 2021-03-31 RX ORDER — ONDANSETRON 8 MG/1
8 TABLET, FILM COATED ORAL
Status: CANCELLED
Start: 2021-03-31

## 2021-03-31 RX ORDER — ONDANSETRON 8 MG/1
8 TABLET, ORALLY DISINTEGRATING ORAL ONCE
Status: DISCONTINUED | OUTPATIENT
Start: 2021-03-31 | End: 2021-03-31 | Stop reason: HOSPADM

## 2021-03-31 RX ORDER — HEPARIN SODIUM (PORCINE) LOCK FLUSH IV SOLN 100 UNIT/ML 100 UNIT/ML
5 SOLUTION INTRAVENOUS
Status: CANCELLED | OUTPATIENT
Start: 2021-03-31

## 2021-03-31 RX ORDER — HEPARIN SODIUM (PORCINE) LOCK FLUSH IV SOLN 100 UNIT/ML 100 UNIT/ML
5 SOLUTION INTRAVENOUS
Status: DISCONTINUED | OUTPATIENT
Start: 2021-03-31 | End: 2021-03-31 | Stop reason: HOSPADM

## 2021-03-31 RX ORDER — DIPHENHYDRAMINE HCL 25 MG
25 CAPSULE ORAL
Status: DISCONTINUED | OUTPATIENT
Start: 2021-03-31 | End: 2021-03-31 | Stop reason: HOSPADM

## 2021-03-31 RX ORDER — MORPHINE SULFATE 2 MG/ML
2 INJECTION, SOLUTION INTRAMUSCULAR; INTRAVENOUS
Status: CANCELLED
Start: 2021-03-31

## 2021-03-31 RX ORDER — MORPHINE SULFATE 2 MG/ML
2 INJECTION, SOLUTION INTRAMUSCULAR; INTRAVENOUS
Status: COMPLETED | OUTPATIENT
Start: 2021-03-31 | End: 2021-03-31

## 2021-03-31 RX ORDER — HEPARIN SODIUM (PORCINE) LOCK FLUSH IV SOLN 100 UNIT/ML 100 UNIT/ML
5 SOLUTION INTRAVENOUS
Status: CANCELLED | OUTPATIENT
Start: 2021-04-02

## 2021-03-31 RX ADMIN — DEXTROSE AND SODIUM CHLORIDE 500 ML: 5; 450 INJECTION, SOLUTION INTRAVENOUS at 10:10

## 2021-03-31 RX ADMIN — MORPHINE SULFATE 2 MG: 2 INJECTION, SOLUTION INTRAMUSCULAR; INTRAVENOUS at 10:04

## 2021-03-31 RX ADMIN — MORPHINE SULFATE 2 MG: 2 INJECTION, SOLUTION INTRAMUSCULAR; INTRAVENOUS at 11:13

## 2021-03-31 RX ADMIN — Medication 5 ML: at 12:41

## 2021-03-31 RX ADMIN — MORPHINE SULFATE 2 MG: 2 INJECTION, SOLUTION INTRAMUSCULAR; INTRAVENOUS at 12:05

## 2021-03-31 ASSESSMENT — EXTERNAL EXAM - RIGHT EYE: OD_EXAM: NORMAL

## 2021-03-31 ASSESSMENT — VISUAL ACUITY
CORRECTION_TYPE: GLASSES
OS_SC: J1
METHOD: SNELLEN - LINEAR
OD_CC: 20/20
OD_SC: J2
OS_CC: 20/20

## 2021-03-31 ASSESSMENT — SLIT LAMP EXAM - LIDS
COMMENTS: NORMAL
COMMENTS: NORMAL

## 2021-03-31 ASSESSMENT — CONF VISUAL FIELD
METHOD: COUNTING FINGERS
OS_NORMAL: 1
OD_NORMAL: 1

## 2021-03-31 ASSESSMENT — REFRACTION_MANIFEST
OD_ADD: +0.50
OS_ADD: +0.50
OS_CYLINDER: SPHERE
OS_SPHERE: +0.50
OD_CYLINDER: SPHERE
OD_SPHERE: +0.75

## 2021-03-31 ASSESSMENT — TONOMETRY
OD_IOP_MMHG: 09
IOP_METHOD: ICARE
OS_IOP_MMHG: 11

## 2021-03-31 ASSESSMENT — REFRACTION_WEARINGRX
OS_CYLINDER: SPHERE
OS_SPHERE: +0.50
OD_SPHERE: +0.75
SPECS_TYPE: SVL
OD_CYLINDER: SPHERE

## 2021-03-31 ASSESSMENT — EXTERNAL EXAM - LEFT EYE: OS_EXAM: NORMAL

## 2021-03-31 ASSESSMENT — PAIN SCALES - GENERAL: PAINLEVEL: EXTREME PAIN (9)

## 2021-03-31 NOTE — NURSING NOTE
Chief Complaints and History of Present Illnesses   Patient presents with     Blurred Vision Evaluation     Chief Complaint(s) and History of Present Illness(es)     Blurred Vision Evaluation     Laterality: both eyes    Quality: blurred vision    Frequency: intermittently    Context: distance vision, near vision and dim lighting    Associated symptoms: Negative for eye pain    Pain scale: 0/10              Comments     Pt notes she wears her gls full time, reading, watching tv, she notes that they worked well for the first month but having occ blurred [place]    Gabbi DAVIS March 31, 2021 8:42 AM

## 2021-03-31 NOTE — TELEPHONE ENCOUNTER
Prior Authorization Approval    Authorization Effective Date: 3/31/2021  Authorization Expiration Date: 3/31/2022  Medication: Eliquis - APPROVED  Approved Dose/Quantity:   Reference #:     Insurance Company: [employer] - Phone [phone] Fax [phone]  Expected CoPay:       CoPay Card Available:      Foundation Assistance Needed:    Which Pharmacy is filling the prescription (Not needed for infusion/clinic administered):    Pharmacy Notified:    Patient Notified:          Caron Martinez CPhT  [facility]  Oncology Pharmacy Liaison  [email or web address]  Phone: [phone]  Fax: [phone]

## 2021-03-31 NOTE — PROGRESS NOTES
Assessment/Plan  (H52.4) Accommodative insufficiency  (primary encounter diagnosis)  Comment: Amps appear normal for her age, but patient is becoming more fatigued at the computer and when reading. Ocular health appears otherwise within normal limits.   Plan: Discussed findings with patient. Recommend patient try +1.25 OTC reading glasses first for computer and near work. Could consider using a low-add PAL as well if switching between different pairs of glasses becomes cumbersome. Return to clinic if symptoms fail to improve or worsen.       Complete documentation of historical and exam elements from today's encounter can  be found in the full encounter summary report (not reduplicated in this progress  note). I personally obtained the chief complaint(s) and history of present illness. I  confirmed and edited as necessary the review of systems, past medical/surgical  history, family history, social history, and examination findings as documented by  others; and I examined the patient myself. I personally reviewed the relevant tests,  images, and reports as documented above. I formulated and edited as necessary the  assessment and plan and discussed the findings and management plan with the  patient and family.    Usama Mcclendon, OD

## 2021-03-31 NOTE — PROGRESS NOTES
Writer placed call to Jennifer to advise that we have [facility]-home desferal and she will be hearing from them soon to set everything up. We reviewed that she will be getting this every 14 days and on the off-weeks, we'll have her come in for labs, but that she should still continue to take her Jadenu. Jennifer voiced understanding of this and will call the pharmacy to inquire about her next Jadenu prescription.

## 2021-03-31 NOTE — TELEPHONE ENCOUNTER
Called patient and left message to inform her that [facility] has an opening at 10am for IV pain meds and an infusion.

## 2021-03-31 NOTE — TELEPHONE ENCOUNTER
Pt called in to triage requesting IVF pain meds for back pain rated 9.5/10 x2 days. Stated last took prn oxycodone, ibuprofen, tylenol and scheduled oxycontin and celebrex at 6 am this morning without relief. Denied any fevers, chills, cough, sob, chest pain or other symptoms. Pt's last infusion was 3/30, last clinic visit 3/30 with follow-up on 4/13 with Andrei HIGGINS. Pt denied being out of home medications and needing any refills today. Pt was approved for ivf pain meds by Dr. Duncan yesterday but was unable to get in, pt informed she is on the infusion list today and will be contacted if any availability comes up.

## 2021-03-31 NOTE — PATIENT INSTRUCTIONS
Dear Jennifer Cervantes    Thank you for choosing [facility] ([facility]) for your infusion.  The following information is a summary of our appointment as well as important reminders.      We look forward in seeing you on your next appointment here at [facility] ([facility]).  Please don t hesitate to call us at [phone] to reschedule any of your appointments or to speak with one of the [facility] registered nurses.  It was a pleasure taking care of you today.    Sincerely,    [facility]  [facility]  [address] [place] [address]  [id number]  Phone:  [phone]

## 2021-03-31 NOTE — PROGRESS NOTES
Writer placed return call to Jennifer, she is asking about getting a different port placed, specifically a power port. She reports that when she has her port accessed, she hears frequently from nursing staff that her port is very deep and it takes multiple attempts to be accessed and she feels pain and pressure surrounding the port site when they do this. Advised that Dr Duncan is currently out of the office but will notify him of request so that at their next scheduled visit, they can discuss options.

## 2021-03-31 NOTE — PROGRESS NOTES
Nursing Note  Jennifer Cervantes [facility] for:   Chief Complaint   Patient presents with     Infusion     IVF, pain meds     During today's [facility] appointment, orders from  were completed.  Frequency: once, PRN    Progress note:  Patient identification verified by name and date of birth.  Assessment completed.  Vitals recorded in Doc Flowsheets.  Patient was provided with education regarding medication/procedure and possible side effects.  Patient verbalized understanding.     present during visit today: Not Applicable.    Treatment Conditions: Non-applicable.    Premedications: were not ordered.    Drug Waste Record: No    Infusion length and rate:  infusion given over approximately 2 hours    Labs: were not ordered for this appointment.    Vascular access: port accessed today.    Is the next appt scheduled? N/A, orders are PRN  Asymptomatic COVID test completed? No    Post Infusion Assessment:  Patient tolerated infusion without incident.  Blood return noted pre and post infusion.  Site patent and intact, free from redness, edema or discomfort.  No evidence of extravasations.  Access discontinued per protocol.     Discharge Plan:   Follow up plan of care with: ordering provider as scheduled.  Discharge instructions were reviewed with patient.  Patient/representative verbalized understanding of discharge instructions and all questions answered.  Patient discharged [facility] in stable condition.    Claudia Landry RN    Administrations This Visit     dextrose 5% and 0.45% NaCl BOLUS     Admin Date  03/31/2021 Action  New Bag Dose  500 mL Rate  250 mL/hr Route  Intravenous Administered By  Claudia Landry RN          heparin 100 UNIT/ML injection 5 mL     Admin Date  03/31/2021 Action  Given Dose  5 mL Route  Intracatheter Administered By  Kaykay Son RN          morphine (PF)  injection 2 mg     Admin Date  03/31/2021 Action  Given Dose  2 mg Route  Intravenous Administered By  Claudia Landry, JOE           Admin Date  03/31/2021 Action  Given Dose  2 mg Route  Intravenous Administered By  Claudia Landry, JOE           Admin Date  03/31/2021 Action  Given Dose  2 mg Route  Intravenous Administered By  Claudia Landry, RN                /81   Pulse 105   Temp 98.5  F (36.9  C) (Oral)   Resp 20   SpO2 96%

## 2021-03-31 NOTE — RESULT ENCOUNTER NOTE
Iron deposition is severe, up from 28 mg/g dry tissue 3 years ago. Plan is to initiate Desferal high dose over the weekend (originally ordered 1x/month but will need to do twice per month) in addition to PO Jadenu.

## 2021-03-31 NOTE — LETTER
3/31/2021         RE: Jennifer Cervantes  [address]  [facility] [address]        Dear Colleague,    Thank you for referring your patient, Jennifer Cervantes, to the [facility]. Please see a copy of my visit note below.    Nursing Note  Jennifer Cervantes [facility] for:   Chief Complaint   Patient presents with     Infusion     IVF, pain meds     During today's [facility] appointment, orders from  were completed.  Frequency: once, PRN    Progress note:  Patient identification verified by name and date of birth.  Assessment completed.  Vitals recorded in Doc Flowsheets.  Patient was provided with education regarding medication/procedure and possible side effects.  Patient verbalized understanding.     present during visit today: Not Applicable.    Treatment Conditions: Non-applicable.    Premedications: were not ordered.    Drug Waste Record: No    Infusion length and rate:  infusion given over approximately 2 hours    Labs: were not ordered for this appointment.    Vascular access: port accessed today.    Is the next appt scheduled? N/A, orders are PRN  Asymptomatic COVID test completed? No    Post Infusion Assessment:  Patient tolerated infusion without incident.  Blood return noted pre and post infusion.  Site patent and intact, free from redness, edema or discomfort.  No evidence of extravasations.  Access discontinued per protocol.     Discharge Plan:   Follow up plan of care with: ordering provider as scheduled.  Discharge instructions were reviewed with patient.  Patient/representative verbalized understanding of discharge instructions and all questions answered.  Patient discharged [facility] in stable condition.    Claudia Landry RN    Administrations This Visit     dextrose 5% and 0.45% NaCl BOLUS     Admin Date  03/31/2021 Action  New  Bag Dose  500 mL Rate  250 mL/hr Route  Intravenous Administered By  Claudia Landry RN          heparin 100 UNIT/ML injection 5 mL     Admin Date  03/31/2021 Action  Given Dose  5 mL Route  Intracatheter Administered By  Kaykay Son RN          morphine (PF) injection 2 mg     Admin Date  03/31/2021 Action  Given Dose  2 mg Route  Intravenous Administered By  Claudia Landry RN           Admin Date  03/31/2021 Action  Given Dose  2 mg Route  Intravenous Administered By  Claudia Landry RN           Admin Date  03/31/2021 Action  Given Dose  2 mg Route  Intravenous Administered By  Claudia Landry, JOE                /81   Pulse 105   Temp 98.5  F (36.9  C) (Oral)   Resp 20   SpO2 96%         Again, thank you for allowing me to participate in the care of your patient.        Sincerely,        [facility]

## 2021-04-01 ENCOUNTER — INFUSION THERAPY VISIT (OUTPATIENT)
Dept: TRANSPLANT | Facility: CLINIC | Age: 21
End: 2021-04-01
Attending: PEDIATRICS
Payer: COMMERCIAL

## 2021-04-01 ENCOUNTER — PATIENT OUTREACH (OUTPATIENT)
Dept: ONCOLOGY | Facility: CLINIC | Age: 21
End: 2021-04-01

## 2021-04-01 VITALS
HEART RATE: 106 BPM | OXYGEN SATURATION: 95 % | TEMPERATURE: 98.4 F | BODY MASS INDEX: 28.17 KG/M2 | RESPIRATION RATE: 16 BRPM | WEIGHT: 164.1 LBS | DIASTOLIC BLOOD PRESSURE: 77 MMHG | SYSTOLIC BLOOD PRESSURE: 124 MMHG

## 2021-04-01 DIAGNOSIS — D57.00 SICKLE CELL PAIN CRISIS (H): Primary | ICD-10-CM

## 2021-04-01 PROCEDURE — 250N000011 HC RX IP 250 OP 636: Performed by: PEDIATRICS

## 2021-04-01 PROCEDURE — 96376 TX/PRO/DX INJ SAME DRUG ADON: CPT

## 2021-04-01 PROCEDURE — 96374 THER/PROPH/DIAG INJ IV PUSH: CPT

## 2021-04-01 PROCEDURE — 96361 HYDRATE IV INFUSION ADD-ON: CPT

## 2021-04-01 PROCEDURE — 258N000003 HC RX IP 258 OP 636: Performed by: PEDIATRICS

## 2021-04-01 RX ORDER — HEPARIN SODIUM,PORCINE 10 UNIT/ML
5 VIAL (ML) INTRAVENOUS
Status: CANCELLED | OUTPATIENT
Start: 2021-04-01

## 2021-04-01 RX ORDER — HEPARIN SODIUM (PORCINE) LOCK FLUSH IV SOLN 100 UNIT/ML 100 UNIT/ML
5 SOLUTION INTRAVENOUS
Status: CANCELLED | OUTPATIENT
Start: 2021-04-01

## 2021-04-01 RX ORDER — DIPHENHYDRAMINE HCL 25 MG
25 CAPSULE ORAL
Status: CANCELLED
Start: 2021-04-01

## 2021-04-01 RX ORDER — ONDANSETRON 8 MG/1
8 TABLET, FILM COATED ORAL
Status: CANCELLED
Start: 2021-04-01

## 2021-04-01 RX ORDER — MORPHINE SULFATE 2 MG/ML
2 INJECTION, SOLUTION INTRAMUSCULAR; INTRAVENOUS
Status: DISCONTINUED | OUTPATIENT
Start: 2021-04-01 | End: 2021-04-01 | Stop reason: HOSPADM

## 2021-04-01 RX ORDER — MORPHINE SULFATE 2 MG/ML
2 INJECTION, SOLUTION INTRAMUSCULAR; INTRAVENOUS
Status: CANCELLED
Start: 2021-04-01

## 2021-04-01 RX ADMIN — DEXTROSE AND SODIUM CHLORIDE 500 ML: 5; 450 INJECTION, SOLUTION INTRAVENOUS at 12:19

## 2021-04-01 RX ADMIN — MORPHINE SULFATE 2 MG: 2 INJECTION, SOLUTION INTRAMUSCULAR; INTRAVENOUS at 12:29

## 2021-04-01 RX ADMIN — MORPHINE SULFATE 2 MG: 2 INJECTION, SOLUTION INTRAMUSCULAR; INTRAVENOUS at 13:38

## 2021-04-01 RX ADMIN — MORPHINE SULFATE 2 MG: 2 INJECTION, SOLUTION INTRAMUSCULAR; INTRAVENOUS at 14:39

## 2021-04-01 ASSESSMENT — PAIN SCALES - GENERAL
PAINLEVEL: EXTREME PAIN (9)
PAINLEVEL: SEVERE PAIN (7)
PAINLEVEL: EXTREME PAIN (8)
PAINLEVEL: SEVERE PAIN (7)

## 2021-04-01 NOTE — NURSING NOTE
"Oncology Rooming Note    April 1, 2021 12:19 PM   Jennifer Cervantes is a 21 year old female who presents for:    Chief Complaint   Patient presents with     Port Flush     Port Flushed by rn in infusion      Infusion     pt here for IVF and pain meds for Sickle Cell crisis     Initial Vitals: /77   Pulse 106   Temp 98.4  F (36.9  C) (Oral)   Resp 16   Wt 74.4 kg (164 lb 1.6 oz)   SpO2 95%   BMI 28.17 kg/m   Estimated body mass index is 28.17 kg/m  as calculated from the following:    Height as of 3/29/21: 1.626 m (5' 4\").    Weight as of this encounter: 74.4 kg (164 lb 1.6 oz). Body surface area is 1.83 meters squared.  Extreme Pain (9) Comment: Data Unavailable   No LMP recorded. Patient has had an injection.  Allergies reviewed: Yes  Medications reviewed: Yes    Medications: Medication refills not needed today.  Pharmacy name entered into [facility]:    [facility] - [place], [place] - [address]  [id number] [address] [facility] - [place] - [address]  [id number] - [facility] [address] [facility] [address] [place]  [facility] #[id number] [facility] [address] [facility] [address] [facility] - [place], [place] [facility] [address]     Clinical concerns: none      PAULINE VANEGAS RN              "

## 2021-04-01 NOTE — PROGRESS NOTES
Jennifer calls to report continued pain in her back. She has taken all of her home medication and has used heat, nothing has worked. She was seen in infusion yesterday which helped some, but the pain has returned. She noticed it back about two hours ago when she woke up. I let her know that I would speak to her doctor and request a space and we would connect with her regarding availability. Dr. Duncan contacted, ok for infusion, no labs needed.    Syeda Ulrich RN

## 2021-04-01 NOTE — PROGRESS NOTES
Infusion Nursing Note:  Jennifer Cervantes presents today for 500 ml of IVF with pain medications.    Patient seen by provider today: No provider or labs needed today   present during visit today: Not Applicable.    Note: Patient added on to infusion for IVF and pain medications per her therapy plan. 500 ml of IVF over 2 hours were given as well as three doses of morphine per therapy plan orders. Patient pain was rated 9/10 in her lower back upon arrival. See flowsheets for pain assessment each dose. Pain assessed after last dose and rated 7/10. Patient reports 6/10 is comfortable for her at the end of infusion usually and she feels her pain level is on its way to that level and feel okay to leave.     Intravenous Access:  Implanted Port.    Treatment Conditions:  Not Applicable.      Post Infusion Assessment:  Patient tolerated infusion without incident.  Blood return noted pre and post infusion.  Site patent and intact, free from redness, edema or discomfort.  No evidence of extravasations.  Access discontinued per protocol.       Discharge Plan:   Patient discharged in stable condition accompanied by: self.  Departure Mode: Ambulatory to meet her cab that she called    PAULINE VANEGAS RN

## 2021-04-01 NOTE — NURSING NOTE
Chief Complaint   Patient presents with     Port Flush     Port Flushed by rn in infusion      Port accessed with 20g 3/4 inch gripper needle by RN, line flushed with saline.    Stan Siddiqui RN

## 2021-04-01 NOTE — LETTER
4/1/2021         RE: Jennifer Cervantes  [address]  [place] [facility] [address]        Dear Colleague,    Thank you for referring your patient, Jennifer Cervantes, to the [facility] [place]. Please see a copy of my visit note below.    Infusion Nursing Note:  Jennifer Cervantes presents today for 500 ml of IVF with pain medications.    Patient seen by provider today: No provider or labs needed today   present during visit today: Not Applicable.    Note: Patient added on to infusion for IVF and pain medications per her therapy plan. 500 ml of IVF over 2 hours were given as well as three doses of morphine per therapy plan orders. Patient pain was rated 9/10 in her lower back upon arrival. See flowsheets for pain assessment each dose. Pain assessed after last dose and rated 7/10. Patient reports 6/10 is comfortable for her at the end of infusion usually and she feels her pain level is on its way to that level and feel okay to leave.     Intravenous Access:  Implanted Port.    Treatment Conditions:  Not Applicable.      Post Infusion Assessment:  Patient tolerated infusion without incident.  Blood return noted pre and post infusion.  Site patent and intact, free from redness, edema or discomfort.  No evidence of extravasations.  Access discontinued per protocol.       Discharge Plan:   Patient discharged in stable condition accompanied by: self.  Departure Mode: Ambulatory to meet her cab that she called    PAULINE VANEGAS RN                            Again, thank you for allowing me to participate in the care of your patient.        Sincerely,        [facility]

## 2021-04-01 NOTE — PROGRESS NOTES
Pt called @ 0912 to see if any appts had become available. By chance we received a message from infusion @ 0910 stating some availability. Relayed that prior note said ok to infuse, no labs needed. Pt opted for noon appointment. Advised her I would ensure she would get scheduled, but would not get a follow-up call and should arrive @ [id number]. Pt verbalized understanding. Messaged scheduling.

## 2021-04-02 ENCOUNTER — INFUSION THERAPY VISIT (OUTPATIENT)
Dept: ONCOLOGY | Facility: CLINIC | Age: 21
End: 2021-04-02
Attending: PHYSICIAN ASSISTANT
Payer: COMMERCIAL

## 2021-04-02 ENCOUNTER — PATIENT OUTREACH (OUTPATIENT)
Dept: ONCOLOGY | Facility: CLINIC | Age: 21
End: 2021-04-02

## 2021-04-02 VITALS
DIASTOLIC BLOOD PRESSURE: 71 MMHG | RESPIRATION RATE: 16 BRPM | HEART RATE: 120 BPM | WEIGHT: 166.3 LBS | TEMPERATURE: 98.3 F | OXYGEN SATURATION: 95 % | SYSTOLIC BLOOD PRESSURE: 135 MMHG | BODY MASS INDEX: 28.55 KG/M2

## 2021-04-02 DIAGNOSIS — D57.00 SICKLE CELL PAIN CRISIS (H): ICD-10-CM

## 2021-04-02 DIAGNOSIS — D57.00 SICKLE CELL CRISIS (H): Primary | ICD-10-CM

## 2021-04-02 DIAGNOSIS — D57.1 HB-SS DISEASE WITHOUT CRISIS (H): Primary | ICD-10-CM

## 2021-04-02 LAB
ALBUMIN SERPL-MCNC: 4 G/DL (ref 3.4–5)
ALP SERPL-CCNC: 93 U/L (ref 40–150)
ALT SERPL W P-5'-P-CCNC: 165 U/L (ref 0–50)
ANION GAP SERPL CALCULATED.3IONS-SCNC: 6 MMOL/L (ref 3–14)
AST SERPL W P-5'-P-CCNC: 129 U/L (ref 0–45)
BASOPHILS # BLD AUTO: 0.2 10E9/L (ref 0–0.2)
BASOPHILS NFR BLD AUTO: 1.8 %
BILIRUB SERPL-MCNC: 2.6 MG/DL (ref 0.2–1.3)
BUN SERPL-MCNC: 13 MG/DL (ref 7–30)
CALCIUM SERPL-MCNC: 8.8 MG/DL (ref 8.5–10.1)
CHLORIDE SERPL-SCNC: 109 MMOL/L (ref 94–109)
CO2 SERPL-SCNC: 22 MMOL/L (ref 20–32)
CREAT SERPL-MCNC: 0.57 MG/DL (ref 0.52–1.04)
DIFFERENTIAL METHOD BLD: ABNORMAL
EOSINOPHIL # BLD AUTO: 1.2 10E9/L (ref 0–0.7)
EOSINOPHIL NFR BLD AUTO: 11.4 %
ERYTHROCYTE [DISTWIDTH] IN BLOOD BY AUTOMATED COUNT: 23.7 % (ref 10–15)
GFR SERPL CREATININE-BSD FRML MDRD: >90 ML/MIN/{1.73_M2}
GLUCOSE SERPL-MCNC: 86 MG/DL (ref 70–99)
HCT VFR BLD AUTO: 22.5 % (ref 35–47)
HGB BLD-MCNC: 7.8 G/DL (ref 11.7–15.7)
IMM GRANULOCYTES # BLD: 0.1 10E9/L (ref 0–0.4)
IMM GRANULOCYTES NFR BLD: 0.5 %
LYMPHOCYTES # BLD AUTO: 3 10E9/L (ref 0.8–5.3)
LYMPHOCYTES NFR BLD AUTO: 29.6 %
MCH RBC QN AUTO: 34.5 PG (ref 26.5–33)
MCHC RBC AUTO-ENTMCNC: 34.7 G/DL (ref 31.5–36.5)
MCV RBC AUTO: 100 FL (ref 78–100)
MONOCYTES # BLD AUTO: 0.6 10E9/L (ref 0–1.3)
MONOCYTES NFR BLD AUTO: 5.6 %
NEUTROPHILS # BLD AUTO: 5.3 10E9/L (ref 1.6–8.3)
NEUTROPHILS NFR BLD AUTO: 51.1 %
NRBC # BLD AUTO: 0.4 10*3/UL
NRBC BLD AUTO-RTO: 4 /100
PLATELET # BLD AUTO: 266 10E9/L (ref 150–450)
POTASSIUM SERPL-SCNC: 4.1 MMOL/L (ref 3.4–5.3)
PROT SERPL-MCNC: 8.5 G/DL (ref 6.8–8.8)
RBC # BLD AUTO: 2.26 10E12/L (ref 3.8–5.2)
RETICS # AUTO: 348.6 10E9/L (ref 25–95)
RETICS/RBC NFR AUTO: 15.5 % (ref 0.5–2)
SODIUM SERPL-SCNC: 138 MMOL/L (ref 133–144)
WBC # BLD AUTO: 10.3 10E9/L (ref 4–11)

## 2021-04-02 PROCEDURE — 96374 THER/PROPH/DIAG INJ IV PUSH: CPT

## 2021-04-02 PROCEDURE — 250N000011 HC RX IP 250 OP 636: Performed by: PEDIATRICS

## 2021-04-02 PROCEDURE — 85025 COMPLETE CBC W/AUTO DIFF WBC: CPT | Performed by: PHYSICIAN ASSISTANT

## 2021-04-02 PROCEDURE — 250N000011 HC RX IP 250 OP 636: Performed by: PHYSICIAN ASSISTANT

## 2021-04-02 PROCEDURE — 80053 COMPREHEN METABOLIC PANEL: CPT | Performed by: PHYSICIAN ASSISTANT

## 2021-04-02 PROCEDURE — 99214 OFFICE O/P EST MOD 30 MIN: CPT | Mod: GT | Performed by: PHYSICIAN ASSISTANT

## 2021-04-02 PROCEDURE — 85045 AUTOMATED RETICULOCYTE COUNT: CPT | Performed by: PHYSICIAN ASSISTANT

## 2021-04-02 PROCEDURE — 258N000003 HC RX IP 258 OP 636: Performed by: PEDIATRICS

## 2021-04-02 PROCEDURE — 96376 TX/PRO/DX INJ SAME DRUG ADON: CPT

## 2021-04-02 PROCEDURE — 96361 HYDRATE IV INFUSION ADD-ON: CPT

## 2021-04-02 RX ORDER — HEPARIN SODIUM (PORCINE) LOCK FLUSH IV SOLN 100 UNIT/ML 100 UNIT/ML
5 SOLUTION INTRAVENOUS
Status: CANCELLED | OUTPATIENT
Start: 2021-04-02

## 2021-04-02 RX ORDER — HEPARIN SODIUM (PORCINE) LOCK FLUSH IV SOLN 100 UNIT/ML 100 UNIT/ML
5 SOLUTION INTRAVENOUS
Status: DISCONTINUED | OUTPATIENT
Start: 2021-04-02 | End: 2021-04-02 | Stop reason: HOSPADM

## 2021-04-02 RX ORDER — HEPARIN SODIUM,PORCINE 10 UNIT/ML
5 VIAL (ML) INTRAVENOUS
Status: DISCONTINUED | OUTPATIENT
Start: 2021-04-02 | End: 2021-04-02 | Stop reason: HOSPADM

## 2021-04-02 RX ORDER — HEPARIN SODIUM (PORCINE) LOCK FLUSH IV SOLN 100 UNIT/ML 100 UNIT/ML
5 SOLUTION INTRAVENOUS ONCE
Status: COMPLETED | OUTPATIENT
Start: 2021-04-02 | End: 2021-04-02

## 2021-04-02 RX ORDER — MORPHINE SULFATE 2 MG/ML
2 INJECTION, SOLUTION INTRAMUSCULAR; INTRAVENOUS
Status: CANCELLED
Start: 2021-04-02

## 2021-04-02 RX ORDER — ONDANSETRON 8 MG/1
8 TABLET, FILM COATED ORAL
Status: CANCELLED
Start: 2021-04-02

## 2021-04-02 RX ORDER — DIPHENHYDRAMINE HCL 25 MG
25 CAPSULE ORAL
Status: CANCELLED
Start: 2021-04-02

## 2021-04-02 RX ORDER — HEPARIN SODIUM,PORCINE 10 UNIT/ML
5 VIAL (ML) INTRAVENOUS
Status: CANCELLED | OUTPATIENT
Start: 2021-04-02

## 2021-04-02 RX ORDER — MORPHINE SULFATE 2 MG/ML
2 INJECTION, SOLUTION INTRAMUSCULAR; INTRAVENOUS
Status: COMPLETED | OUTPATIENT
Start: 2021-04-02 | End: 2021-04-02

## 2021-04-02 RX ADMIN — DEXTROSE AND SODIUM CHLORIDE 500 ML: 5; 450 INJECTION, SOLUTION INTRAVENOUS at 13:41

## 2021-04-02 RX ADMIN — Medication 5 ML: at 13:13

## 2021-04-02 RX ADMIN — MORPHINE SULFATE 2 MG: 2 INJECTION, SOLUTION INTRAMUSCULAR; INTRAVENOUS at 13:41

## 2021-04-02 RX ADMIN — Medication 5 ML: at 16:14

## 2021-04-02 RX ADMIN — MORPHINE SULFATE 2 MG: 2 INJECTION, SOLUTION INTRAMUSCULAR; INTRAVENOUS at 14:56

## 2021-04-02 RX ADMIN — MORPHINE SULFATE 2 MG: 2 INJECTION, SOLUTION INTRAMUSCULAR; INTRAVENOUS at 15:58

## 2021-04-02 ASSESSMENT — PAIN SCALES - GENERAL: PAINLEVEL: EXTREME PAIN (9)

## 2021-04-02 NOTE — PROGRESS NOTES
This is a recent snapshot of the patient's [facility] medical record.  For current drug dose and complete information and questions, call [phone] or In Basket pool, fv home infusion ([id number])  CSN Number:  [id number]

## 2021-04-02 NOTE — PROGRESS NOTES
Jennifer calls with a request to be seen in infusion, she has continued pain in her back. She has taken home medication and does not have any other symptoms including no fever or shortness of breath. She feels similar to how she did yesterday. I let her know she was added to the infusion list and I would connect with her care team and we would give her a call back.

## 2021-04-02 NOTE — PROGRESS NOTES
Infusion Nursing Note:  Jennifer Cervantes presents for add on IVF/pain medications  Met with RONALDO Allan before infusion.    Pain: pt arrives today complaining of 9/10 pain all over her body, 2mg morphine given every hour as needed per orders, after 1st dose pain down to an 8, after second dose pain down to a 6/7, after last dose of morphine pain down to a 6 and pt felt okay to discharge home    Treatment Conditions:  Lab Results   Component Value Date    HGB 7.8 04/02/2021     Lab Results   Component Value Date    WBC 10.3 04/02/2021      Lab Results   Component Value Date    ANEU 5.3 04/02/2021     Lab Results   Component Value Date     04/02/2021      Lab Results   Component Value Date     04/02/2021                   Lab Results   Component Value Date    POTASSIUM 4.1 04/02/2021           Lab Results   Component Value Date    MAG 1.7 02/21/2021            Lab Results   Component Value Date    CR 0.57 04/02/2021                   Lab Results   Component Value Date    MICAH 8.8 04/02/2021                Lab Results   Component Value Date    BILITOTAL 2.6 04/02/2021           Lab Results   Component Value Date    ALBUMIN 4.0 04/02/2021                    Lab Results   Component Value Date     04/02/2021           Lab Results   Component Value Date     04/02/2021       Results reviewed, labs MET treatment parameters, ok to proceed with treatment.    Intravenous Access:  Implanted Port.    Post Infusion Assessment:  Patient tolerated infusion without incident.  Blood return noted pre and post infusion.  No evidence of extravasations.  Access discontinued per protocol.    Discharge Plan:   Patient declined prescription refills.  Discharge instructions reviewed with: Patient.  Patient and/or family verbalized understanding of discharge instructions and all questions answered.  AVS to patient via [employer].  Patient will return 4/16 for next appointment.   Patient discharged in stable  condition accompanied by: self.    Afshan Diop, RN, RN

## 2021-04-02 NOTE — LETTER
4/2/2021         RE: Jennifer Cervantes  [address]  [place] [facility] [address]        Dear Colleague,    Thank you for referring your patient, Jennifer Cervantes, to the [facility]. Please see a copy of my visit note below.    Oncology/Hematology Visit Note  Apr 2, 2021    Reason for Visit: Follow up of sickle cell hgbSS disease     History of Present Illness: HgbSS complicated by frequent pain crises (acute and chronic components), history of stroke leading to significant cognitive delays and right upper extremity hemiparesis, iron overload 2/2 chronic transfusions as secondary ppx post-CVA, anxiety/depression, asthma, She is currently on Hydrea and Jadenu with plan to add Desferal due to significant iron overload.      She was admitted 2/1/21-2/3/21 with a new PE, started on Rivaroxaban. Switched to Eliquis 3/25/21 with RUE DVT.    She has been having more pain recently which is why I am seeing her today.     Interval History:  Jennifer was called today for hematology follow-up. She has been having a hard time controlling her pain the last few weeks. She states it started in her arms, legs, and back but now it is throughout her entire body. She has been using OxyContin BID, Oxycodone every 6 hours, Celebrex BID, Tylenol 2-3x per day, Ibuprofen 2-3x per day, Voltaren gel yet still is quite uncomfortable. She notes the Oxycodone is the most helpful for her pain-the addition of OxyContin hasn't made much of a difference but she is willing to stay on it a bit longer to see if it will help. She cannot think of any trigger for the pain other than the changing weather.    She denies any fevers and has a thermometer now so has been monitoring her temp. She denies headaches or dizziness and prior concerns about numbness and drooping in her face resolved. No chest pain, SOB, or cough, using her asthma inhalers. No nausea, vomiting, abdominal pain, bowel or bladder concerns. She is eating and drinking  well. Swelling in her arm is improved and she is taking her Eliquis.     She feels like her mood is OK, noting she spends most of her time in her room exercising or listening to music which she enjoys.     She would like to get a new port as nurses sometimes have difficulty with hers given it is a pediatric port.     Current Outpatient Medications   Medication Sig Dispense Refill     acetaminophen (TYLENOL) 325 MG tablet Take 2 tablets (650 mg) by mouth every 6 hours as needed for mild pain 120 tablet 3     albuterol (PROAIR HFA/PROVENTIL HFA/VENTOLIN HFA) 108 (90 Base) MCG/ACT inhaler Inhale 2 puffs into the lungs every 6 hours as needed for shortness of breath / dyspnea or wheezing 8.5 g 3     albuterol (PROVENTIL) (2.5 MG/3ML) 0.083% neb solution Take 1 vial (2.5 mg) by nebulization every 6 hours as needed for shortness of breath / dyspnea or wheezing 12 mL 4     Apixaban Starter Pack (ELIQUIS DVT/PE STARTER PACK) 5 MG TBPK Take 10 mg by mouth 2 times daily for 7 days, THEN 5 mg 2 times daily for 23 days. 74 each 0     ARIPiprazole (ABILIFY) 2 MG tablet Take 1 tablet (2 mg) by mouth daily 30 tablet 3     aspirin (ASPIRIN) 81 MG EC tablet Take 1 tablet (81 mg) by mouth daily . HOLD while on Xarelto 90 tablet 3     budesonide-formoterol (SYMBICORT) 160-4.5 MCG/ACT Inhaler Inhale 2 puffs into the lungs 2 times daily 10.2 g 3     celecoxib (CELEBREX) 100 MG capsule Take 1 capsule (100 mg) by mouth 2 times daily 60 capsule 3     diclofenac (VOLTAREN) 1 % topical gel Apply 4 g topically 4 times daily as needed for moderate pain Apply to back, legs, and arms for pain 150 g 3     diphenhydrAMINE (BENADRYL) 25 MG capsule Take 1-2 capsules (25-50 mg) by mouth nightly as needed for sleep 60 capsule 3     Hydroxyurea 1000 MG TABS Take 2,000 mg by mouth daily 60 tablet 3     JADENU 360 MG tablet Take 4 tablets (1,440 mg) by mouth every evening 30 tablet 0     medroxyPROGESTERone (DEPO-PROVERA) 150 MG/ML IM injection Inject  150 mg into the muscle       ondansetron (ZOFRAN) 8 MG tablet        oxyCODONE (OXYCONTIN) 10 MG 12 hr tablet Take 1 tablet (10 mg) by mouth every 12 hours 60 tablet 0     oxyCODONE IR (ROXICODONE) 15 MG tablet Take 1 tablet (15 mg) by mouth every 6 hours as needed for severe pain 60 tablet 0     sertraline (ZOLOFT) 100 MG tablet Take 2 tablets (200 mg) by mouth daily 180 tablet 3     EPINEPHrine (ANY BX GENERIC EQUIV) 0.3 MG/0.3ML injection 2-pack Inject 0.3 mLs (0.3 mg) into the muscle as needed for anaphylaxis (Patient not taking: Reported on 4/2/2021) 1 each 1     naloxone (NARCAN) 4 MG/0.1ML nasal spray Spray 1 spray (4 mg) into one nostril alternating nostrils once as needed for opioid reversal every 2-3 minutes until assistance arrives (Patient not taking: Reported on 4/2/2021) 0.2 mL 1     Physical Examination:  There were no vitals taken for this visit.  Wt Readings from Last 10 Encounters:   04/01/21 74.4 kg (164 lb 1.6 oz)   03/29/21 74.8 kg (165 lb)   03/28/21 74.8 kg (165 lb)   03/27/21 74.8 kg (165 lb)   03/26/21 73.9 kg (162 lb 14.4 oz)   03/25/21 73.6 kg (162 lb 4.8 oz)   03/23/21 74.4 kg (164 lb)   03/17/21 74.8 kg (165 lb)   03/04/21 75 kg (165 lb 4.8 oz)   03/01/21 75 kg (165 lb 5.5 oz)     Video physical exam  General: Patient appears well in no acute distress.   Skin: No visualized rash or lesions on visualized skin  Eyes: EOMI, no erythema, sclera icterus or discharge noted  Resp: Appears to be breathing comfortably without accessory muscle usage, speaking in full sentences, no cough  MSK: Appears to have normal range of motion based on visualized movements  Neurologic: No apparent tremors, facial movements symmetric  Psych: affect normal, alert and oriented    The rest of a comprehensive physical examination is deferred due to PHE (public health emergency) video restrictions    Laboratory Data:  Results for MIKAELA, HIMANSHU I (MRN [id number]) as of 4/2/2021 14:25   4/2/2021 13:20   Sodium 138    Potassium 4.1   Chloride 109   Carbon Dioxide 22   Urea Nitrogen 13   Creatinine 0.57   GFR Estimate >90   GFR Estimate If Black >90   Calcium 8.8   Anion Gap 6   Albumin 4.0   Protein Total 8.5   Bilirubin Total 2.6 (H)   Alkaline Phosphatase 93    (H)    (H)   Glucose 86   WBC 10.3   Hemoglobin 7.8 (L)   Hematocrit 22.5 (L)   Platelet Count 266   RBC Count 2.26 (L)      MCH 34.5 (H)   MCHC 34.7   RDW 23.7 (H)   Diff Method PENDING   % Neutrophils 51.1   % Lymphocytes 29.6   % Monocytes 5.6   % Eosinophils 11.4   % Basophils 1.8   % Immature Granulocytes 0.5   Nucleated RBCs 4 (H)   Absolute Neutrophil 5.3   Absolute Lymphocytes 3.0   Absolute Monocytes 0.6   Absolute Eosinophils 1.2 (H)   Absolute Basophils 0.2   Abs Immature Granulocytes 0.1   Absolute Nucleated RBC 0.4   % Retic 15.5 (H)   Absolute Retic 348.6 (H)         Assessment and Plan:  1. Sickle Cell Disease  HgbSS, has been having more issues with pain recently with more ED visits and hospitalizations. She continues to feel poorly today with pain though no other focal symptoms. Discussed admission to hospital but she declined stating she would like to try to manage at home.      Will repeat IV fluids and IV morphine today.      Labs: Hgb and WBC OK. LFT elevation remains but stable, will monitor. Retic slightly more elevated-likely 2/2 ongoing crisis. Will recheck at next visit.      Meds: Stopped Voxeletor. Continue Hydrea 2000mg daily. Contineu Jadenu 4 tablets daily. Working with pharmacy to do high dose Desderal x 48 hours over the weekend, hopefully can start soon.      Pain Control: Continue OxyContin 10mg BID. Continue Oxycodone to 15mg PRN and this is working well. Continue Tylenol PRN, and Celebrex PRN. Off Naproxen. Try to take less Ibuprofen while on Xarelto.      Follow-up: Has visit with Dr. Duncan 4/16/21. Will ask about new port.      2. Neuro  History of stroke, no new concerns though discussed calling with any  recurrent facial numbness as she had last week. ASA on hold while on anticoagulation for PE.      3. Psych  History of anxiety and depression. Continue Sertraline. States mood is OK and denies any significant stressors at thts time.      4. Pulm/Vascular  History of asthma, continue Symbicort and Albuterol PRN.      Bilateral PE, diagnosed 2/1/21, was on Xarelto. Then RUE chronic DVGT and SVT, switched to Eliquis. No bleeding concerns and prior swelling resolved. Will need total 3 months of anticoagulation (through May 2021).    Did not discuss sickle cell health maintenance today.     45 minutes spent on the date of the encounter doing chart review, review of test results, interpretation of tests, patient visit and documentation     Andrei Machado PA-C  [facility]  [address] [place] [id number] [phone]        Again, thank you for allowing me to participate in the care of your patient.      Sincerely,    RONALDO Allan

## 2021-04-02 NOTE — PROGRESS NOTES
Call received from patient to ask if she will get IV pain medication today.  Informed that she will need visit with provider at 10:20 am to set up plan.  Patient hung up the phone without further conversation.

## 2021-04-02 NOTE — PROGRESS NOTES
Jennifer is a 21 year old who is being evaluated via a billable video visit.      How would you like to obtain your AVS? MyChart  If the video visit is dropped, the invitation should be resent by: Text to cell phone: [phone]  Will anyone else be joining your video visit? No      Video Start Time: 10:20am    Video-Visit Details    Type of service:  Video Visit    Video End Time: 10:46am    Originating Location (pt. Location): Home    Distant Location (provider location):  [facility]     Platform used for Video Visit: [facility]     Oncology/Hematology Visit Note  Apr 2, 2021    Reason for Visit: Follow up of sickle cell hgbSS disease     History of Present Illness: HgbSS complicated by frequent pain crises (acute and chronic components), history of stroke leading to significant cognitive delays and right upper extremity hemiparesis, iron overload 2/2 chronic transfusions as secondary ppx post-CVA, anxiety/depression, asthma, She is currently on Hydrea and Jadenu with plan to add Desferal due to significant iron overload.      She was admitted 2/1/21-2/3/21 with a new PE, started on Rivaroxaban. Switched to Eliquis 3/25/21 with RUE DVT.    She has been having more pain recently which is why I am seeing her today.     Interval History:  Jennifer was called today for hematology follow-up. She has been having a hard time controlling her pain the last few weeks. She states it started in her arms, legs, and back but now it is throughout her entire body. She has been using OxyContin BID, Oxycodone every 6 hours, Celebrex BID, Tylenol 2-3x per day, Ibuprofen 2-3x per day, Voltaren gel yet still is quite uncomfortable. She notes the Oxycodone is the most helpful for her pain-the addition of OxyContin hasn't made much of a difference but she is willing to stay on it a bit longer to see if it will help. She cannot think of any trigger for the pain other than the changing weather.    She denies any fevers  and has a thermometer now so has been monitoring her temp. She denies headaches or dizziness and prior concerns about numbness and drooping in her face resolved. No chest pain, SOB, or cough, using her asthma inhalers. No nausea, vomiting, abdominal pain, bowel or bladder concerns. She is eating and drinking well. Swelling in her arm is improved and she is taking her Eliquis.     She feels like her mood is OK, noting she spends most of her time in her room exercising or listening to music which she enjoys.     She would like to get a new port as nurses sometimes have difficulty with hers given it is a pediatric port.     Current Outpatient Medications   Medication Sig Dispense Refill     acetaminophen (TYLENOL) 325 MG tablet Take 2 tablets (650 mg) by mouth every 6 hours as needed for mild pain 120 tablet 3     albuterol (PROAIR HFA/PROVENTIL HFA/VENTOLIN HFA) 108 (90 Base) MCG/ACT inhaler Inhale 2 puffs into the lungs every 6 hours as needed for shortness of breath / dyspnea or wheezing 8.5 g 3     albuterol (PROVENTIL) (2.5 MG/3ML) 0.083% neb solution Take 1 vial (2.5 mg) by nebulization every 6 hours as needed for shortness of breath / dyspnea or wheezing 12 mL 4     Apixaban Starter Pack (ELIQUIS DVT/PE STARTER PACK) 5 MG TBPK Take 10 mg by mouth 2 times daily for 7 days, THEN 5 mg 2 times daily for 23 days. 74 each 0     ARIPiprazole (ABILIFY) 2 MG tablet Take 1 tablet (2 mg) by mouth daily 30 tablet 3     aspirin (ASPIRIN) 81 MG EC tablet Take 1 tablet (81 mg) by mouth daily . HOLD while on Xarelto 90 tablet 3     budesonide-formoterol (SYMBICORT) 160-4.5 MCG/ACT Inhaler Inhale 2 puffs into the lungs 2 times daily 10.2 g 3     celecoxib (CELEBREX) 100 MG capsule Take 1 capsule (100 mg) by mouth 2 times daily 60 capsule 3     diclofenac (VOLTAREN) 1 % topical gel Apply 4 g topically 4 times daily as needed for moderate pain Apply to back, legs, and arms for pain 150 g 3     diphenhydrAMINE (BENADRYL) 25 MG  capsule Take 1-2 capsules (25-50 mg) by mouth nightly as needed for sleep 60 capsule 3     Hydroxyurea 1000 MG TABS Take 2,000 mg by mouth daily 60 tablet 3     JADENU 360 MG tablet Take 4 tablets (1,440 mg) by mouth every evening 30 tablet 0     medroxyPROGESTERone (DEPO-PROVERA) 150 MG/ML IM injection Inject 150 mg into the muscle       ondansetron (ZOFRAN) 8 MG tablet        oxyCODONE (OXYCONTIN) 10 MG 12 hr tablet Take 1 tablet (10 mg) by mouth every 12 hours 60 tablet 0     oxyCODONE IR (ROXICODONE) 15 MG tablet Take 1 tablet (15 mg) by mouth every 6 hours as needed for severe pain 60 tablet 0     sertraline (ZOLOFT) 100 MG tablet Take 2 tablets (200 mg) by mouth daily 180 tablet 3     EPINEPHrine (ANY BX GENERIC EQUIV) 0.3 MG/0.3ML injection 2-pack Inject 0.3 mLs (0.3 mg) into the muscle as needed for anaphylaxis (Patient not taking: Reported on 4/2/2021) 1 each 1     naloxone (NARCAN) 4 MG/0.1ML nasal spray Spray 1 spray (4 mg) into one nostril alternating nostrils once as needed for opioid reversal every 2-3 minutes until assistance arrives (Patient not taking: Reported on 4/2/2021) 0.2 mL 1     Physical Examination:  There were no vitals taken for this visit.  Wt Readings from Last 10 Encounters:   04/01/21 74.4 kg (164 lb 1.6 oz)   03/29/21 74.8 kg (165 lb)   03/28/21 74.8 kg (165 lb)   03/27/21 74.8 kg (165 lb)   03/26/21 73.9 kg (162 lb 14.4 oz)   03/25/21 73.6 kg (162 lb 4.8 oz)   03/23/21 74.4 kg (164 lb)   03/17/21 74.8 kg (165 lb)   03/04/21 75 kg (165 lb 4.8 oz)   03/01/21 75 kg (165 lb 5.5 oz)     Video physical exam  General: Patient appears well in no acute distress.   Skin: No visualized rash or lesions on visualized skin  Eyes: EOMI, no erythema, sclera icterus or discharge noted  Resp: Appears to be breathing comfortably without accessory muscle usage, speaking in full sentences, no cough  MSK: Appears to have normal range of motion based on visualized movements  Neurologic: No apparent  tremors, facial movements symmetric  Psych: affect normal, alert and oriented    The rest of a comprehensive physical examination is deferred due to PHE (public health emergency) video restrictions    Laboratory Data:  Results for HIMANSHU AL (MRN [id number]) as of 4/2/2021 14:25   4/2/2021 13:20   Sodium 138   Potassium 4.1   Chloride 109   Carbon Dioxide 22   Urea Nitrogen 13   Creatinine 0.57   GFR Estimate >90   GFR Estimate If Black >90   Calcium 8.8   Anion Gap 6   Albumin 4.0   Protein Total 8.5   Bilirubin Total 2.6 (H)   Alkaline Phosphatase 93    (H)    (H)   Glucose 86   WBC 10.3   Hemoglobin 7.8 (L)   Hematocrit 22.5 (L)   Platelet Count 266   RBC Count 2.26 (L)      MCH 34.5 (H)   MCHC 34.7   RDW 23.7 (H)   Diff Method PENDING   % Neutrophils 51.1   % Lymphocytes 29.6   % Monocytes 5.6   % Eosinophils 11.4   % Basophils 1.8   % Immature Granulocytes 0.5   Nucleated RBCs 4 (H)   Absolute Neutrophil 5.3   Absolute Lymphocytes 3.0   Absolute Monocytes 0.6   Absolute Eosinophils 1.2 (H)   Absolute Basophils 0.2   Abs Immature Granulocytes 0.1   Absolute Nucleated RBC 0.4   % Retic 15.5 (H)   Absolute Retic 348.6 (H)         Assessment and Plan:  1. Sickle Cell Disease  HgbSS, has been having more issues with pain recently with more ED visits and hospitalizations. She continues to feel poorly today with pain though no other focal symptoms. Discussed admission to hospital but she declined stating she would like to try to manage at home.      Will repeat IV fluids and IV morphine today.      Labs: Hgb and WBC OK. LFT elevation remains but stable, will monitor. Retic slightly more elevated-likely 2/2 ongoing crisis. Will recheck at next visit.      Meds: Stopped Voxeletor. Continue Hydrea 2000mg daily. Contineu Jadenu 4 tablets daily. Working with pharmacy to do high dose Desderal x 48 hours over the weekend, hopefully can start soon.      Pain Control: Continue OxyContin 10mg BID.  Continue Oxycodone to 15mg PRN and this is working well. Continue Tylenol PRN, and Celebrex PRN. Off Naproxen. Try to take less Ibuprofen while on Xarelto.      Follow-up: Has visit with Dr. Duncan 4/16/21. Will ask about new port.      2. Neuro  History of stroke, no new concerns though discussed calling with any recurrent facial numbness as she had last week. ASA on hold while on anticoagulation for PE.      3. Psych  History of anxiety and depression. Continue Sertraline. States mood is OK and denies any significant stressors at thts time.      4. Pulm/Vascular  History of asthma, continue Symbicort and Albuterol PRN.      Bilateral PE, diagnosed 2/1/21, was on Xarelto. Then RUE chronic DVGT and SVT, switched to Eliquis. No bleeding concerns and prior swelling resolved. Will need total 3 months of anticoagulation (through May 2021).    Did not discuss sickle cell health maintenance today.     45 minutes spent on the date of the encounter doing chart review, review of test results, interpretation of tests, patient visit and documentation     Andrei Machado PA-C  [facility]  [address], [place] [id number] [phone]

## 2021-04-02 NOTE — PROGRESS NOTES
Spoke to Andrei, who was agreeable to a virtual visit with Jennifer. Jennifer notified and agreeable as well. Request made to scheduling.    Syeda Ulrich RN

## 2021-04-03 ENCOUNTER — HOSPITAL ENCOUNTER (EMERGENCY)
Facility: CLINIC | Age: 21
Discharge: HOME | End: 2021-04-03
Attending: STUDENT IN AN ORGANIZED HEALTH CARE EDUCATION/TRAINING PROGRAM | Admitting: STUDENT IN AN ORGANIZED HEALTH CARE EDUCATION/TRAINING PROGRAM
Payer: COMMERCIAL

## 2021-04-03 VITALS
BODY MASS INDEX: 28.34 KG/M2 | HEART RATE: 118 BPM | TEMPERATURE: 98.7 F | DIASTOLIC BLOOD PRESSURE: 69 MMHG | WEIGHT: 166 LBS | SYSTOLIC BLOOD PRESSURE: 132 MMHG | HEIGHT: 64 IN | RESPIRATION RATE: 16 BRPM | OXYGEN SATURATION: 93 %

## 2021-04-03 DIAGNOSIS — D57.00 SICKLE CELL PAIN CRISIS (H): ICD-10-CM

## 2021-04-03 LAB
ALBUMIN SERPL-MCNC: 3.8 G/DL (ref 3.4–5)
ALP SERPL-CCNC: 91 U/L (ref 40–150)
ALT SERPL W P-5'-P-CCNC: 180 U/L (ref 0–50)
ANION GAP SERPL CALCULATED.3IONS-SCNC: 2 MMOL/L (ref 3–14)
APTT PPP: 128 SEC (ref 22–37)
AST SERPL W P-5'-P-CCNC: 154 U/L (ref 0–45)
BILIRUB SERPL-MCNC: 2.4 MG/DL (ref 0.2–1.3)
BUN SERPL-MCNC: 8 MG/DL (ref 7–30)
CALCIUM SERPL-MCNC: 8.7 MG/DL (ref 8.5–10.1)
CHLORIDE SERPL-SCNC: 106 MMOL/L (ref 94–109)
CO2 SERPL-SCNC: 25 MMOL/L (ref 20–32)
CREAT SERPL-MCNC: 0.7 MG/DL (ref 0.52–1.04)
CRP SERPL-MCNC: 8.8 MG/L (ref 0–8)
DIFFERENTIAL METHOD BLD: ABNORMAL
ERYTHROCYTE [DISTWIDTH] IN BLOOD BY AUTOMATED COUNT: 24.4 % (ref 10–15)
GFR SERPL CREATININE-BSD FRML MDRD: >90 ML/MIN/{1.73_M2}
GLUCOSE SERPL-MCNC: 99 MG/DL (ref 70–99)
HCG SERPL QL: NEGATIVE
HCT VFR BLD AUTO: 21.2 % (ref 35–47)
HGB BLD-MCNC: 7.5 G/DL (ref 11.7–15.7)
INR PPP: 1.28 (ref 0.86–1.14)
MCH RBC QN AUTO: 34.1 PG (ref 26.5–33)
MCHC RBC AUTO-ENTMCNC: 35.4 G/DL (ref 31.5–36.5)
MCV RBC AUTO: 96 FL (ref 78–100)
PLATELET # BLD AUTO: 264 10E9/L (ref 150–450)
POTASSIUM SERPL-SCNC: 3.9 MMOL/L (ref 3.4–5.3)
PROT SERPL-MCNC: 8.1 G/DL (ref 6.8–8.8)
RBC # BLD AUTO: 2.2 10E12/L (ref 3.8–5.2)
RETICS # AUTO: 297 10E9/L (ref 25–95)
RETICS/RBC NFR AUTO: 13.5 % (ref 0.5–2)
SODIUM SERPL-SCNC: 134 MMOL/L (ref 133–144)
WBC # BLD AUTO: 13.3 10E9/L (ref 4–11)

## 2021-04-03 PROCEDURE — 86140 C-REACTIVE PROTEIN: CPT | Performed by: STUDENT IN AN ORGANIZED HEALTH CARE EDUCATION/TRAINING PROGRAM

## 2021-04-03 PROCEDURE — 99285 EMERGENCY DEPT VISIT HI MDM: CPT | Mod: 25 | Performed by: STUDENT IN AN ORGANIZED HEALTH CARE EDUCATION/TRAINING PROGRAM

## 2021-04-03 PROCEDURE — 85610 PROTHROMBIN TIME: CPT | Performed by: STUDENT IN AN ORGANIZED HEALTH CARE EDUCATION/TRAINING PROGRAM

## 2021-04-03 PROCEDURE — 250N000011 HC RX IP 250 OP 636: Performed by: STUDENT IN AN ORGANIZED HEALTH CARE EDUCATION/TRAINING PROGRAM

## 2021-04-03 PROCEDURE — 80053 COMPREHEN METABOLIC PANEL: CPT | Performed by: STUDENT IN AN ORGANIZED HEALTH CARE EDUCATION/TRAINING PROGRAM

## 2021-04-03 PROCEDURE — 85730 THROMBOPLASTIN TIME PARTIAL: CPT | Performed by: STUDENT IN AN ORGANIZED HEALTH CARE EDUCATION/TRAINING PROGRAM

## 2021-04-03 PROCEDURE — 96374 THER/PROPH/DIAG INJ IV PUSH: CPT | Performed by: STUDENT IN AN ORGANIZED HEALTH CARE EDUCATION/TRAINING PROGRAM

## 2021-04-03 PROCEDURE — 96361 HYDRATE IV INFUSION ADD-ON: CPT | Performed by: STUDENT IN AN ORGANIZED HEALTH CARE EDUCATION/TRAINING PROGRAM

## 2021-04-03 PROCEDURE — 84703 CHORIONIC GONADOTROPIN ASSAY: CPT | Performed by: STUDENT IN AN ORGANIZED HEALTH CARE EDUCATION/TRAINING PROGRAM

## 2021-04-03 PROCEDURE — 99285 EMERGENCY DEPT VISIT HI MDM: CPT | Performed by: STUDENT IN AN ORGANIZED HEALTH CARE EDUCATION/TRAINING PROGRAM

## 2021-04-03 PROCEDURE — 96376 TX/PRO/DX INJ SAME DRUG ADON: CPT | Performed by: STUDENT IN AN ORGANIZED HEALTH CARE EDUCATION/TRAINING PROGRAM

## 2021-04-03 PROCEDURE — 85025 COMPLETE CBC W/AUTO DIFF WBC: CPT | Performed by: STUDENT IN AN ORGANIZED HEALTH CARE EDUCATION/TRAINING PROGRAM

## 2021-04-03 PROCEDURE — 85045 AUTOMATED RETICULOCYTE COUNT: CPT | Performed by: STUDENT IN AN ORGANIZED HEALTH CARE EDUCATION/TRAINING PROGRAM

## 2021-04-03 PROCEDURE — 258N000003 HC RX IP 258 OP 636: Performed by: STUDENT IN AN ORGANIZED HEALTH CARE EDUCATION/TRAINING PROGRAM

## 2021-04-03 RX ORDER — MORPHINE SULFATE 2 MG/ML
2 INJECTION, SOLUTION INTRAMUSCULAR; INTRAVENOUS
Status: DISCONTINUED | OUTPATIENT
Start: 2021-04-03 | End: 2021-04-04 | Stop reason: HOSPADM

## 2021-04-03 RX ORDER — HEPARIN SODIUM (PORCINE) LOCK FLUSH IV SOLN 100 UNIT/ML 100 UNIT/ML
5 SOLUTION INTRAVENOUS
Status: DISCONTINUED | OUTPATIENT
Start: 2021-04-03 | End: 2021-04-04 | Stop reason: HOSPADM

## 2021-04-03 RX ORDER — SODIUM CHLORIDE, SODIUM LACTATE, POTASSIUM CHLORIDE, CALCIUM CHLORIDE 600; 310; 30; 20 MG/100ML; MG/100ML; MG/100ML; MG/100ML
INJECTION, SOLUTION INTRAVENOUS CONTINUOUS
Status: DISCONTINUED | OUTPATIENT
Start: 2021-04-03 | End: 2021-04-04 | Stop reason: HOSPADM

## 2021-04-03 RX ORDER — MORPHINE SULFATE 2 MG/ML
2 INJECTION, SOLUTION INTRAMUSCULAR; INTRAVENOUS ONCE
Status: COMPLETED | OUTPATIENT
Start: 2021-04-03 | End: 2021-04-03

## 2021-04-03 RX ADMIN — SODIUM CHLORIDE, POTASSIUM CHLORIDE, SODIUM LACTATE AND CALCIUM CHLORIDE 1000 ML: 600; 310; 30; 20 INJECTION, SOLUTION INTRAVENOUS at 20:49

## 2021-04-03 RX ADMIN — MORPHINE SULFATE 2 MG: 2 INJECTION, SOLUTION INTRAMUSCULAR; INTRAVENOUS at 20:32

## 2021-04-03 RX ADMIN — MORPHINE SULFATE 2 MG: 2 INJECTION, SOLUTION INTRAMUSCULAR; INTRAVENOUS at 19:16

## 2021-04-03 RX ADMIN — SODIUM CHLORIDE, POTASSIUM CHLORIDE, SODIUM LACTATE AND CALCIUM CHLORIDE 1000 ML: 600; 310; 30; 20 INJECTION, SOLUTION INTRAVENOUS at 19:15

## 2021-04-03 RX ADMIN — MORPHINE SULFATE 2 MG: 2 INJECTION, SOLUTION INTRAMUSCULAR; INTRAVENOUS at 21:51

## 2021-04-03 RX ADMIN — Medication 5 ML: at 21:57

## 2021-04-03 ASSESSMENT — MIFFLIN-ST. JEOR: SCORE: 1497.97

## 2021-04-03 NOTE — ED TRIAGE NOTES
Presents with generalized pain for the past couple hours. Patient reports the most severe pain is in her back. Patient has hx of sickle cell, pain not relieved by home medication.

## 2021-04-03 NOTE — ED PROVIDER NOTES
[place] EMERGENCY DEPARTMENT ([facility])  April 3, 2021  History     Chief Complaint   Patient presents with     Sickle Cell Pain Crisis     HPI  Jennifer Cervantes is a 21 year old female w/ PMH of HgbSS c/b pain crises, hx of stroke w/ cognitive delays and right upper extremity hemiparesis, iron overload 2/2 chronic transfusions (on on Hydrea and Jadenu for this) anxiety/depression, and asthma, who presents to the Emergency Department with hours of generalized pain most acute in the back.  States that she failed to achieve adequate pain control at home.  Reports that pain is currently 9 out of 10 in intensity.  Denies any nausea or vomiting.  No leg swelling.  No recent trauma or injury.  No numbness, loss of bowel or bladder control.  No chest pain or shortness of breath.  No headache, no vision changes, no weakness. No other neurological symptoms at this time.    PAST MEDICAL HISTORY:   Past Medical History:   Diagnosis Date     Anemia      Anxiety      Bleeding disorder (H)      Blood clotting disorder (H)      Cerebral infarction (H) 2015     Cognitive developmental delay     low IQ. 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 (H)     right hand contractures     Iron overload due to repeated red blood cell transfusions      Migraines      Multiple subsegmental pulmonary emboli without acute cor pulmonale (H) 02/01/2021     Oppositional defiant behavior      Superficial venous thrombosis of arm, right 03/25/2021     Uncomplicated asthma        PAST SURGICAL HISTORY:   Past Surgical History:   Procedure Laterality Date     AS INSERT TUNNELED CV 2 CATH W/O PORT/PUMP       C BREAST REDUCTION (INCLUDES LIPO) TIER 3 Bilateral 04/23/2019     CHOLECYSTECTOMY       IR CVC NON TUNNEL PLACEMENT  04/07/2020     REPAIR TENDON ELBOW Right 10/02/2019    Procedure: Right Elbow Flexor  Lengthening, Flexor Pronator Slide Of Wrist and Finger, Thumb Adductor Lengthening;  Surgeon: Anai Franco MD;  Location: [facility] OR     TONSILLECTOMY Bilateral 10/02/2019    Procedure: Bilateral Tonsillectomy;  Surgeon: Farhana Guy MD;  Location: [facility] OR       Past medical history, past surgical history, medications, and allergies were reviewed with the patient. Additional pertinent items: None    FAMILY HISTORY:   Family History   Problem Relation Age of Onset     Sickle Cell Trait Mother      Hypertension Mother      Asthma Mother      Sickle Cell Trait Father        SOCIAL HISTORY:   Social History     Tobacco Use     Smoking status: Never Smoker     Smokeless tobacco: Never Used   Substance Use Topics     Alcohol use: Not Currently     Alcohol/week: 0.0 standard drinks     Social history was reviewed with the patient. Additional pertinent items: None      Discharge Medication List as of 4/3/2021  9:54 PM      CONTINUE these medications which have NOT CHANGED    Details   acetaminophen (TYLENOL) 325 MG tablet Take 2 tablets (650 mg) by mouth every 6 hours as needed for mild pain, Disp-120 tablet, R-3, E-Prescribe      oxyCODONE (OXYCONTIN) 10 MG 12 hr tablet Take 1 tablet (10 mg) by mouth every 12 hours, Disp-60 tablet, R-0, E-Prescribe      oxyCODONE IR (ROXICODONE) 15 MG tablet Take 1 tablet (15 mg) by mouth every 6 hours as needed for severe pain, Disp-60 tablet, R-0, E-Prescribe      albuterol (PROAIR HFA/PROVENTIL HFA/VENTOLIN HFA) 108 (90 Base) MCG/ACT inhaler Inhale 2 puffs into the lungs every 6 hours as needed for shortness of breath / dyspnea or wheezing, Disp-8.5 g, R-3, E-PrescribePharmacy may dispense brand covered by insurance (Proair, or proventil or ventolin or generic albuterol inhaler)      albuterol (PROVENTIL) (2.5 MG/3ML) 0.083% neb solution Take 1 vial (2.5 mg) by nebulization every 6 hours as needed for shortness of breath / dyspnea or wheezing, Disp-12 mL, R-4,  E-Prescribe      Apixaban Starter Pack (ELIQUIS DVT/PE STARTER PACK) 5 MG TBPK Take 10 mg by mouth 2 times daily for 7 days, THEN 5 mg 2 times daily for 23 days., Disp-74 each, R-0, E-Prescribe      ARIPiprazole (ABILIFY) 2 MG tablet Take 1 tablet (2 mg) by mouth daily, Disp-30 tablet, R-3, E-Prescribe      aspirin (ASPIRIN) 81 MG EC tablet Take 1 tablet (81 mg) by mouth daily . HOLD while on Xarelto, Disp-90 tablet, R-3, Historical      budesonide-formoterol (SYMBICORT) 160-4.5 MCG/ACT Inhaler Inhale 2 puffs into the lungs 2 times daily, Disp-10.2 g, R-3, E-Prescribe      celecoxib (CELEBREX) 100 MG capsule Take 1 capsule (100 mg) by mouth 2 times daily, Disp-60 capsule, R-3, E-Prescribe      diclofenac (VOLTAREN) 1 % topical gel Apply 4 g topically 4 times daily as needed for moderate pain Apply to back, legs, and arms for pain, Disp-150 g, R-3, E-Prescribe      diphenhydrAMINE (BENADRYL) 25 MG capsule Take 1-2 capsules (25-50 mg) by mouth nightly as needed for sleep, Disp-60 capsule, R-3, E-Prescribe      EPINEPHrine (ANY BX GENERIC EQUIV) 0.3 MG/0.3ML injection 2-pack Inject 0.3 mLs (0.3 mg) into the muscle as needed for anaphylaxis, Disp-1 each, R-1, E-Prescribe      Hydroxyurea 1000 MG TABS Take 2,000 mg by mouth daily, Disp-60 tablet, R-3, E-Prescribe      JADENU 360 MG tablet Take 4 tablets (1,440 mg) by mouth every evening, Disp-30 tablet, R-0, SHIELA, E-Prescribe      medroxyPROGESTERone (DEPO-PROVERA) 150 MG/ML IM injection Inject 150 mg into the muscle, Historical      naloxone (NARCAN) 4 MG/0.1ML nasal spray Spray 1 spray (4 mg) into one nostril alternating nostrils once as needed for opioid reversal every 2-3 minutes until assistance arrives, Disp-0.2 mL,R-1, E-Prescribe      ondansetron (ZOFRAN) 8 MG tablet Historical      sertraline (ZOLOFT) 100 MG tablet Take 2 tablets (200 mg) by mouth daily, Disp-180 tablet, R-3, E-Prescribe                Allergies   Allergen Reactions     Fish-Derived Products  "Hives     Seafood Hives     Contrast Dye      Diagnostic X-Ray Materials      Gadolinium         Review of Systems   Constitutional: Negative for chills and fever.   HENT: Negative for ear pain, facial swelling, rhinorrhea and sore throat.    Eyes: Negative for pain, discharge and redness.   Respiratory: Negative for shortness of breath and wheezing.    Cardiovascular: Negative for chest pain.   Gastrointestinal: Positive for abdominal pain. Negative for constipation, diarrhea, nausea and vomiting.   Genitourinary: Negative for dysuria, flank pain, hematuria, vaginal bleeding and vaginal discharge.   Musculoskeletal: Positive for back pain. Negative for neck pain.   Skin: Negative for rash and wound.   Neurological: Negative for dizziness, weakness, numbness and headaches.     A complete review of systems was performed with pertinent positives and negatives noted in the HPI, and all other systems negative.    Physical Exam   BP: 136/85  Pulse: 117  Temp: 98.7  F (37.1  C)  Resp: 16  Height: 162.6 cm (5' 4\")  Weight: 75.3 kg (166 lb)  SpO2: 93 %      Physical Exam  Constitutional:       General: She is not in acute distress.     Appearance: Normal appearance. She is not diaphoretic.   HENT:      Head: Normocephalic and atraumatic.      Nose: Nose normal.      Mouth/Throat:      Mouth: Mucous membranes are moist.      Pharynx: Oropharynx is clear. No oropharyngeal exudate.   Eyes:      General: Lids are normal. No scleral icterus.     Extraocular Movements: Extraocular movements intact.      Conjunctiva/sclera: Conjunctivae normal.      Pupils: Pupils are equal, round, and reactive to light.   Neck:      Musculoskeletal: Full passive range of motion without pain, normal range of motion and neck supple.   Cardiovascular:      Rate and Rhythm: Normal rate and regular rhythm.      Pulses: Normal pulses.      Heart sounds: Normal heart sounds. No murmur. No friction rub. No gallop.    Pulmonary:      Effort: Pulmonary " effort is normal. No respiratory distress.      Breath sounds: Normal breath sounds. No stridor. No wheezing, rhonchi or rales.   Abdominal:      General: Bowel sounds are normal.      Palpations: Abdomen is soft.      Tenderness: There is no abdominal tenderness.   Musculoskeletal: Normal range of motion.         General: No tenderness.      Comments: Diffuse back pain, no signs of trauma   Skin:     General: Skin is warm and dry.      Capillary Refill: Capillary refill takes less than 2 seconds.      Findings: No rash.   Neurological:      General: No focal deficit present.      Mental Status: She is oriented to person, place, and time. Mental status is at baseline.      GCS: GCS eye subscore is 4. GCS verbal subscore is 5. GCS motor subscore is 6.   Psychiatric:         Attention and Perception: Attention normal.         Mood and Affect: Mood normal.         Speech: Speech normal.         Behavior: Behavior normal.         ED Course   6:48 PM  The patient was seen and examined by Som Connor MD in Room ED22.      Procedures             Results for orders placed or performed during the hospital encounter of 04/03/21 (from the past 24 hour(s))   CBC with platelets differential   Result Value Ref Range    WBC 13.3 (H) 4.0 - 11.0 10e9/L    RBC Count 2.20 (L) 3.8 - 5.2 10e12/L    Hemoglobin 7.5 (L) 11.7 - 15.7 g/dL    Hematocrit 21.2 (L) 35.0 - 47.0 %    MCV 96 78 - 100 fl    MCH 34.1 (H) 26.5 - 33.0 pg    MCHC 35.4 31.5 - 36.5 g/dL    RDW 24.4 (H) 10.0 - 15.0 %    Platelet Count 264 150 - 450 10e9/L    Diff Method Manual Differential    INR   Result Value Ref Range    INR 1.28 (H) 0.86 - 1.14   Partial thromboplastin time   Result Value Ref Range     (HH) 22 - 37 sec   Comprehensive metabolic panel   Result Value Ref Range    Sodium 134 133 - 144 mmol/L    Potassium 3.9 3.4 - 5.3 mmol/L    Chloride 106 94 - 109 mmol/L    Carbon Dioxide 25 20 - 32 mmol/L    Anion Gap 2 (L) 3 - 14 mmol/L    Glucose 99 70  - 99 mg/dL    Urea Nitrogen 8 7 - 30 mg/dL    Creatinine 0.70 0.52 - 1.04 mg/dL    GFR Estimate >90 >60 mL/min/[1.73_m2]    GFR Estimate If Black >90 >60 mL/min/[1.73_m2]    Calcium 8.7 8.5 - 10.1 mg/dL    Bilirubin Total 2.4 (H) 0.2 - 1.3 mg/dL    Albumin 3.8 3.4 - 5.0 g/dL    Protein Total 8.1 6.8 - 8.8 g/dL    Alkaline Phosphatase 91 40 - 150 U/L     (H) 0 - 50 U/L     (H) 0 - 45 U/L   CRP inflammation   Result Value Ref Range    CRP Inflammation 8.8 (H) 0.0 - 8.0 mg/L   HCG qualitative Blood   Result Value Ref Range    HCG Qualitative Serum Negative NEG^Negative   Reticulocyte count   Result Value Ref Range    % Retic 13.5 (H) 0.5 - 2.0 %    Absolute Retic 297.0 (H) 25 - 95 10e9/L     Medications   lactated ringers BOLUS 1,000 mL (0 mLs Intravenous Stopped 4/3/21 2049)     Followed by   lactated ringers BOLUS 1,000 mL (0 mLs Intravenous Stopped 4/3/21 2154)   morphine (PF) injection 2 mg (2 mg Intravenous Given 4/3/21 2151)             Assessments & Plan (with Medical Decision Making)   This is a 21 year old female who presents to the ED for evaluation of back pain that is ongoing. Given her history and my examination, my suspicion is that the pain is musculoskeletal in origin versus sickle cell pain crisis. The differential also includes infection such as epidural abscess and cauda equina syndrome, however there are no focal neurological findings by history or exam and I do not suspect these are likely to be the cause of the pain. She has no urinary retention or loss of bowel control. I also do not believe that this pain is vascular in origin, or related to the renal system.   Evaluation and management will include CBC, CMP, CRP, reticulocyte count, PT/INR. Disposition is pending clinical course.     Blood work shows mildly elevated inflammatory markers, appropriate reticulocyte count, mild leukocytosis, mild anemia, no transfusion indicated, patient is not pregnant, normal renal function, no  acute electrode abnormalities requiring acute correction.    Follow patient's care plan regarding pain management emergency department, successful analgesia was achieved, patient was comfortable going home.  Patient was discharged and will follow up with primary care for further assessment.    Please take all medications as prescribed and instructed. Please follow up with your primary care provider as discussed, return to the Emergency Department should your symptoms worsen or change.      I have reviewed the nursing notes.    I have reviewed the findings, diagnosis, plan and need for follow up with the patient.    Discharge Medication List as of 4/3/2021  9:54 PM          Final diagnoses:   Sickle cell pain crisis (H)     I, Haim Cohn, am serving as a trained medical scribe to document services personally performed by Som Connor MD, based on the provider's statements to me.   ISom MD, was physically present and have reviewed and verified the accuracy of this note documented by Haim Cohn.     4/3/2021   [facility] EMERGENCY DEPARTMENT     Som Connor MD  04/04/21 0129

## 2021-04-04 ASSESSMENT — ENCOUNTER SYMPTOMS
WHEEZING: 0
DIARRHEA: 0
NECK PAIN: 0
NUMBNESS: 0
CONSTIPATION: 0
EYE PAIN: 0
CHILLS: 0
NAUSEA: 0
HEADACHES: 0
HEMATURIA: 0
WEAKNESS: 0
RHINORRHEA: 0
EYE REDNESS: 0
VOMITING: 0
DYSURIA: 0
WOUND: 0
FLANK PAIN: 0
EYE DISCHARGE: 0
BACK PAIN: 1
DIZZINESS: 0
ABDOMINAL PAIN: 1
FACIAL SWELLING: 0
SORE THROAT: 0
FEVER: 0
SHORTNESS OF BREATH: 0

## 2021-04-05 ENCOUNTER — INFUSION THERAPY VISIT (OUTPATIENT)
Dept: INFUSION THERAPY | Facility: CLINIC | Age: 21
End: 2021-04-05
Attending: PEDIATRICS
Payer: COMMERCIAL

## 2021-04-05 ENCOUNTER — PATIENT OUTREACH (OUTPATIENT)
Dept: CARE COORDINATION | Facility: CLINIC | Age: 21
End: 2021-04-05

## 2021-04-05 ENCOUNTER — TELEPHONE (OUTPATIENT)
Dept: ONCOLOGY | Facility: CLINIC | Age: 21
End: 2021-04-05

## 2021-04-05 ENCOUNTER — TELEPHONE (OUTPATIENT)
Dept: INFUSION THERAPY | Facility: CLINIC | Age: 21
End: 2021-04-05

## 2021-04-05 ENCOUNTER — PATIENT OUTREACH (OUTPATIENT)
Dept: ONCOLOGY | Facility: CLINIC | Age: 21
End: 2021-04-05

## 2021-04-05 ENCOUNTER — HOME INFUSION (PRE-WILLOW HOME INFUSION) (OUTPATIENT)
Dept: PHARMACY | Facility: CLINIC | Age: 21
End: 2021-04-05

## 2021-04-05 VITALS
TEMPERATURE: 98.4 F | RESPIRATION RATE: 16 BRPM | SYSTOLIC BLOOD PRESSURE: 127 MMHG | OXYGEN SATURATION: 94 % | HEART RATE: 103 BPM | DIASTOLIC BLOOD PRESSURE: 79 MMHG

## 2021-04-05 DIAGNOSIS — D57.00 SICKLE CELL PAIN CRISIS (H): Primary | ICD-10-CM

## 2021-04-05 PROCEDURE — 96376 TX/PRO/DX INJ SAME DRUG ADON: CPT

## 2021-04-05 PROCEDURE — 96361 HYDRATE IV INFUSION ADD-ON: CPT

## 2021-04-05 PROCEDURE — 250N000011 HC RX IP 250 OP 636: Performed by: PEDIATRICS

## 2021-04-05 PROCEDURE — 258N000003 HC RX IP 258 OP 636: Performed by: PEDIATRICS

## 2021-04-05 PROCEDURE — 96374 THER/PROPH/DIAG INJ IV PUSH: CPT

## 2021-04-05 RX ORDER — MORPHINE SULFATE 2 MG/ML
2 INJECTION, SOLUTION INTRAMUSCULAR; INTRAVENOUS
Status: COMPLETED | OUTPATIENT
Start: 2021-04-05 | End: 2021-04-05

## 2021-04-05 RX ORDER — HEPARIN SODIUM (PORCINE) LOCK FLUSH IV SOLN 100 UNIT/ML 100 UNIT/ML
5 SOLUTION INTRAVENOUS
Status: CANCELLED | OUTPATIENT
Start: 2021-04-05

## 2021-04-05 RX ORDER — HEPARIN SODIUM (PORCINE) LOCK FLUSH IV SOLN 100 UNIT/ML 100 UNIT/ML
5 SOLUTION INTRAVENOUS
Status: DISCONTINUED | OUTPATIENT
Start: 2021-04-05 | End: 2021-04-05 | Stop reason: HOSPADM

## 2021-04-05 RX ORDER — HEPARIN SODIUM,PORCINE 10 UNIT/ML
5 VIAL (ML) INTRAVENOUS
Status: CANCELLED | OUTPATIENT
Start: 2021-04-05

## 2021-04-05 RX ORDER — MORPHINE SULFATE 2 MG/ML
2 INJECTION, SOLUTION INTRAMUSCULAR; INTRAVENOUS
Status: CANCELLED
Start: 2021-04-05

## 2021-04-05 RX ORDER — HEPARIN SODIUM,PORCINE 10 UNIT/ML
5 VIAL (ML) INTRAVENOUS
Status: DISCONTINUED | OUTPATIENT
Start: 2021-04-05 | End: 2021-04-05 | Stop reason: HOSPADM

## 2021-04-05 RX ORDER — ONDANSETRON 8 MG/1
8 TABLET, FILM COATED ORAL
Status: CANCELLED
Start: 2021-04-05

## 2021-04-05 RX ORDER — DIPHENHYDRAMINE HCL 25 MG
25 CAPSULE ORAL
Status: CANCELLED
Start: 2021-04-05

## 2021-04-05 RX ADMIN — DEXTROSE AND SODIUM CHLORIDE 500 ML: 5; 450 INJECTION, SOLUTION INTRAVENOUS at 14:19

## 2021-04-05 RX ADMIN — Medication 5 ML: at 16:33

## 2021-04-05 RX ADMIN — MORPHINE SULFATE 2 MG: 2 INJECTION, SOLUTION INTRAMUSCULAR; INTRAVENOUS at 16:23

## 2021-04-05 RX ADMIN — MORPHINE SULFATE 2 MG: 2 INJECTION, SOLUTION INTRAMUSCULAR; INTRAVENOUS at 14:20

## 2021-04-05 RX ADMIN — MORPHINE SULFATE 2 MG: 2 INJECTION, SOLUTION INTRAMUSCULAR; INTRAVENOUS at 15:25

## 2021-04-05 NOTE — LETTER
4/5/2021         RE: Jennifer Cervantes  [address]  [facility] [address]        Dear Colleague,    Thank you for referring your patient, Jennifer Cervantes, to the [facility]. Please see a copy of my visit note below.    Nursing Note  Jennifer Cervantes [facility] for:   Chief Complaint   Patient presents with     Infusion     IVF + pain meds     During today's [facility] appointment, orders from Dr. Duncan were completed.  Frequency: as needed    Progress note:  Patient identification verified by name and date of birth.  Assessment completed.  Vitals recorded in Doc Flowsheets.  Patient was provided with education regarding medication/procedure and possible side effects.  Patient verbalized understanding.     present during visit today: Not Applicable.    Treatment Conditions: Non-applicable.    Premedications: were not ordered.    Drug Waste Record: No    Infusion length and rate:  infusion given over approximately 2 hours    Labs: were not ordered for this appointment.    Vascular access: port accessed today.    Is the next appt scheduled? PRN  Asymptomatic COVID test completed? no    Post Infusion Assessment:  Patient tolerated infusion without incident.     Discharge Plan:   Follow up plan of care with: ordering provider as scheduled.  Discharge instructions were reviewed with patient.  Patient/representative verbalized understanding of discharge instructions and all questions answered.  [facility] discharged [facility] in stable condition.    Yina Ford RN       Administrations This Visit     dextrose 5% and 0.45% NaCl BOLUS     Admin Date  04/05/2021 Action  New Bag Dose  500 mL Rate  250 mL/hr Route  Intravenous Administered By  Yina Ford RN          heparin 100 UNIT/ML injection 5 mL     Admin Date  04/05/2021 Action  Given Dose  5 mL  Route  Intracatheter Administered By  Yina Ford, RN          morphine (PF) injection 2 mg     Admin Date  04/05/2021 Action  Given Dose  2 mg Route  Intravenous Administered By  Yina Ford RN           Admin Date  04/05/2021 Action  Given Dose  2 mg Route  Intravenous Administered By  Yina Ford, JOE           Admin Date  04/05/2021 Action  Given Dose  2 mg Route  Intravenous Administered By  Yina Ford, JOE              /79 (BP Location: Left arm)   Pulse 103   Temp 98.4  F (36.9  C) (Oral)   Resp 16   SpO2 94%           Again, thank you for allowing me to participate in the care of your patient.        Sincerely,        [facility]

## 2021-04-05 NOTE — TELEPHONE ENCOUNTER
Received a call on the RN triage line from [facility] at [facility]. Stacy stated that [facility] has tried multiple times to reach Jennifer to set up her [facility] infusions since 3/31 and have been unable to reach her. They will continue to attempt to reach her through the end of the week. At that time they will consider the referral cancelled. Inbasket message sent to Dr. Duncan and Lien Nicolas, JOECC with this information.

## 2021-04-05 NOTE — TELEPHONE ENCOUNTER
[facility] Telephone Triage Note    The following symptoms were reported:   Typical  Description:            Onset:  A few hours ago around 4:00am   Location: Both arms and lower back  Character: Sharp           Intensity: 9/10    Accompanying Signs & Symptoms:  none    Chest Pain:  denies     Shortness of Breath:  denies     Fever:  denies     Chills:  denies   Cough/sore throat:  denies  Other:  Denies other symptoms    Therapies Tried and outcome: Took meds around 7:00am, heat blanket, rest, warm bath, drinking water.    Needs transportation    Improved by: Heat/shower only helped a little.     The following provider was consulted:  7:27am Per Andrei HIGGINS      The following advice/orders were given, and/or interventions recommended:    Approved for IVF/Pain meds today, no labs needed    2:00pm infusion in [facility] today.      Abdullahi notified of needing rides    Scheduling notified    Patient instructions and/or follow up:    Called and relayed information to Pt, who verbalized understanding.    Instructed patient to seek care immediately for worsening symptoms, including: fever, chest pain, shortness of breath, dizziness.

## 2021-04-05 NOTE — PROGRESS NOTES
Nursing Note  Jennifer Cervantes presents [facility] for:   Chief Complaint   Patient presents with     Infusion     IVF + pain meds     During [facility]'s [facility] appointment, orders from Dr. Duncan were completed.  Frequency: as needed    Progress note:  Patient identification verified by name and date of birth.  Assessment completed.  Vitals recorded in Doc Flowsheets.  Patient was provided with education regarding medication/procedure and possible side effects.  Patient verbalized understanding.     present during visit today: Not Applicable.    Treatment Conditions: Non-applicable.    Premedications: were not ordered.    Drug Waste Record: No    Infusion length and rate:  infusion given over approximately 2 hours    Labs: were not ordered for this appointment.    Vascular access: port accessed today.    Is the next appt scheduled? PRN  Asymptomatic COVID test completed? no    Post Infusion Assessment:  Patient tolerated infusion without incident.     Discharge Plan:   Follow up plan of care with: ordering provider as scheduled.  Discharge instructions were reviewed with patient.  Patient/representative verbalized understanding of discharge instructions and all questions answered.  Patient discharged [facility] in stable condition.    Yina Ford RN       Administrations This Visit     dextrose 5% and 0.45% NaCl BOLUS     Admin Date  04/05/2021 Action  New Bag Dose  500 mL Rate  250 mL/hr Route  Intravenous Administered By  Yina Ford, JOE          heparin 100 UNIT/ML injection 5 mL     Admin Date  04/05/2021 Action  Given Dose  5 mL Route  Intracatheter Administered By  Yina Ford RN          morphine (PF) injection 2 mg     Admin Date  04/05/2021 Action  Given Dose  2 mg Route  Intravenous Administered By  Yina Ford RN           Admin Date  04/05/2021 Action  Given Dose  2 mg  Route  Intravenous Administered By  Yina Ford, RN           Admin Date  04/05/2021 Action  Given Dose  2 mg Route  Intravenous Administered By  Yina Ford, RN              /79 (BP Location: Left arm)   Pulse 103   Temp 98.4  F (36.9  C) (Oral)   Resp 16   SpO2 94%

## 2021-04-05 NOTE — PROGRESS NOTES
Writer placed call to Jennifer in response to messages received that [facility] had not been able to reach her and would cancel referral if not able to schedule by Friday. Jennifer unsure of when they have called but open to having call transferred to scheduling RN for [facility]. Call soft transferred and will await for further information on her schedule.

## 2021-04-05 NOTE — PROGRESS NOTES
Social Work Follow-Up  [facility]    Data/Intervention:  Patient Name:  Jennifer Cervatnes  /Age:  2000 (21 year old)    Reason for Follow-Up:  Transportation    Collaborated With:    -Elroy Rodriguez RN  -[facility]  -Pt    Intervention/Education/Resources Provided:  SW received a call from elroy Rodriguez RN about pt needing ride set up for an appointment at 2pm today. SW called pt's insurance and arranged ride through transportation plus with a will call return ride. Pt was given ride information and stated she has the phone number to call for her return ride.    Assessment/Plan:  SW will remain available as needed.  Previously provided patient/family with writer's contact information and availability.       CARLOS Chavez,LGSW  Hematology/Oncology Social Worker  Phone:[phone] Pager: [phone]

## 2021-04-06 ENCOUNTER — PATIENT OUTREACH (OUTPATIENT)
Dept: CARE COORDINATION | Facility: CLINIC | Age: 21
End: 2021-04-06

## 2021-04-06 ENCOUNTER — HOME INFUSION (PRE-WILLOW HOME INFUSION) (OUTPATIENT)
Dept: PHARMACY | Facility: CLINIC | Age: 21
End: 2021-04-06

## 2021-04-06 ENCOUNTER — INFUSION THERAPY VISIT (OUTPATIENT)
Dept: ONCOLOGY | Facility: CLINIC | Age: 21
End: 2021-04-06
Attending: PHYSICIAN ASSISTANT
Payer: COMMERCIAL

## 2021-04-06 ENCOUNTER — TELEPHONE (OUTPATIENT)
Dept: ONCOLOGY | Facility: CLINIC | Age: 21
End: 2021-04-06

## 2021-04-06 VITALS
DIASTOLIC BLOOD PRESSURE: 84 MMHG | BODY MASS INDEX: 28.29 KG/M2 | TEMPERATURE: 98.7 F | SYSTOLIC BLOOD PRESSURE: 125 MMHG | HEART RATE: 107 BPM | WEIGHT: 164.8 LBS | RESPIRATION RATE: 18 BRPM | OXYGEN SATURATION: 94 %

## 2021-04-06 DIAGNOSIS — D57.00 SICKLE CELL PAIN CRISIS (H): ICD-10-CM

## 2021-04-06 DIAGNOSIS — D57.1 HB-SS DISEASE WITHOUT CRISIS (H): Primary | ICD-10-CM

## 2021-04-06 LAB
ALBUMIN SERPL-MCNC: 3.9 G/DL (ref 3.4–5)
ALP SERPL-CCNC: 89 U/L (ref 40–150)
ALT SERPL W P-5'-P-CCNC: 179 U/L (ref 0–50)
ANION GAP SERPL CALCULATED.3IONS-SCNC: 6 MMOL/L (ref 3–14)
AST SERPL W P-5'-P-CCNC: 149 U/L (ref 0–45)
BASOPHILS # BLD AUTO: 0.1 10E9/L (ref 0–0.2)
BASOPHILS NFR BLD AUTO: 1.1 %
BILIRUB SERPL-MCNC: 3.3 MG/DL (ref 0.2–1.3)
BUN SERPL-MCNC: 10 MG/DL (ref 7–30)
CALCIUM SERPL-MCNC: 8.8 MG/DL (ref 8.5–10.1)
CHLORIDE SERPL-SCNC: 107 MMOL/L (ref 94–109)
CO2 SERPL-SCNC: 22 MMOL/L (ref 20–32)
CREAT SERPL-MCNC: 0.59 MG/DL (ref 0.52–1.04)
DIFFERENTIAL METHOD BLD: ABNORMAL
EOSINOPHIL # BLD AUTO: 1.2 10E9/L (ref 0–0.7)
EOSINOPHIL NFR BLD AUTO: 9.4 %
ERYTHROCYTE [DISTWIDTH] IN BLOOD BY AUTOMATED COUNT: 23 % (ref 10–15)
GFR SERPL CREATININE-BSD FRML MDRD: >90 ML/MIN/{1.73_M2}
GLUCOSE SERPL-MCNC: 85 MG/DL (ref 70–99)
HCT VFR BLD AUTO: 23.2 % (ref 35–47)
HGB BLD-MCNC: 8.1 G/DL (ref 11.7–15.7)
IMM GRANULOCYTES # BLD: 0.1 10E9/L (ref 0–0.4)
IMM GRANULOCYTES NFR BLD: 0.6 %
LYMPHOCYTES # BLD AUTO: 2.3 10E9/L (ref 0.8–5.3)
LYMPHOCYTES NFR BLD AUTO: 18 %
MCH RBC QN AUTO: 34.2 PG (ref 26.5–33)
MCHC RBC AUTO-ENTMCNC: 34.9 G/DL (ref 31.5–36.5)
MCV RBC AUTO: 98 FL (ref 78–100)
MONOCYTES # BLD AUTO: 0.5 10E9/L (ref 0–1.3)
MONOCYTES NFR BLD AUTO: 3.9 %
NEUTROPHILS # BLD AUTO: 8.4 10E9/L (ref 1.6–8.3)
NEUTROPHILS NFR BLD AUTO: 67 %
NRBC # BLD AUTO: 0.3 10*3/UL
NRBC BLD AUTO-RTO: 3 /100
PLATELET # BLD AUTO: 279 10E9/L (ref 150–450)
POTASSIUM SERPL-SCNC: 4.1 MMOL/L (ref 3.4–5.3)
PROT SERPL-MCNC: 8.5 G/DL (ref 6.8–8.8)
RBC # BLD AUTO: 2.37 10E12/L (ref 3.8–5.2)
RETICS # AUTO: 456.4 10E9/L (ref 25–95)
RETICS/RBC NFR AUTO: 19 % (ref 0.5–2)
SODIUM SERPL-SCNC: 135 MMOL/L (ref 133–144)
WBC # BLD AUTO: 12.5 10E9/L (ref 4–11)

## 2021-04-06 PROCEDURE — 250N000011 HC RX IP 250 OP 636: Performed by: PEDIATRICS

## 2021-04-06 PROCEDURE — G0463 HOSPITAL OUTPT CLINIC VISIT: HCPCS | Mod: 25

## 2021-04-06 PROCEDURE — 80053 COMPREHEN METABOLIC PANEL: CPT | Performed by: PHYSICIAN ASSISTANT

## 2021-04-06 PROCEDURE — 96361 HYDRATE IV INFUSION ADD-ON: CPT

## 2021-04-06 PROCEDURE — 85045 AUTOMATED RETICULOCYTE COUNT: CPT | Performed by: PHYSICIAN ASSISTANT

## 2021-04-06 PROCEDURE — 85025 COMPLETE CBC W/AUTO DIFF WBC: CPT | Performed by: PHYSICIAN ASSISTANT

## 2021-04-06 PROCEDURE — 258N000003 HC RX IP 258 OP 636: Performed by: PEDIATRICS

## 2021-04-06 PROCEDURE — 250N000011 HC RX IP 250 OP 636: Performed by: PHYSICIAN ASSISTANT

## 2021-04-06 PROCEDURE — 96376 TX/PRO/DX INJ SAME DRUG ADON: CPT

## 2021-04-06 PROCEDURE — 96374 THER/PROPH/DIAG INJ IV PUSH: CPT

## 2021-04-06 RX ORDER — MORPHINE SULFATE 2 MG/ML
2 INJECTION, SOLUTION INTRAMUSCULAR; INTRAVENOUS
Status: COMPLETED | OUTPATIENT
Start: 2021-04-06 | End: 2021-04-06

## 2021-04-06 RX ORDER — HEPARIN SODIUM (PORCINE) LOCK FLUSH IV SOLN 100 UNIT/ML 100 UNIT/ML
5 SOLUTION INTRAVENOUS
Status: CANCELLED | OUTPATIENT
Start: 2021-04-06

## 2021-04-06 RX ORDER — MORPHINE SULFATE 2 MG/ML
2 INJECTION, SOLUTION INTRAMUSCULAR; INTRAVENOUS
Status: CANCELLED
Start: 2021-04-06

## 2021-04-06 RX ORDER — HEPARIN SODIUM,PORCINE 10 UNIT/ML
5 VIAL (ML) INTRAVENOUS
Status: CANCELLED | OUTPATIENT
Start: 2021-04-06

## 2021-04-06 RX ORDER — HEPARIN SODIUM,PORCINE 10 UNIT/ML
5 VIAL (ML) INTRAVENOUS
Status: DISCONTINUED | OUTPATIENT
Start: 2021-04-06 | End: 2021-04-06 | Stop reason: HOSPADM

## 2021-04-06 RX ORDER — DIPHENHYDRAMINE HCL 25 MG
25 CAPSULE ORAL
Status: CANCELLED
Start: 2021-04-06

## 2021-04-06 RX ORDER — HEPARIN SODIUM (PORCINE) LOCK FLUSH IV SOLN 100 UNIT/ML 100 UNIT/ML
5 SOLUTION INTRAVENOUS ONCE
Status: COMPLETED | OUTPATIENT
Start: 2021-04-06 | End: 2021-04-06

## 2021-04-06 RX ORDER — ONDANSETRON 8 MG/1
8 TABLET, FILM COATED ORAL
Status: CANCELLED
Start: 2021-04-06

## 2021-04-06 RX ORDER — HEPARIN SODIUM (PORCINE) LOCK FLUSH IV SOLN 100 UNIT/ML 100 UNIT/ML
5 SOLUTION INTRAVENOUS
Status: DISCONTINUED | OUTPATIENT
Start: 2021-04-06 | End: 2021-04-06 | Stop reason: HOSPADM

## 2021-04-06 RX ADMIN — Medication 5 ML: at 11:47

## 2021-04-06 RX ADMIN — MORPHINE SULFATE 2 MG: 2 INJECTION, SOLUTION INTRAMUSCULAR; INTRAVENOUS at 14:07

## 2021-04-06 RX ADMIN — DEXTROSE AND SODIUM CHLORIDE 500 ML: 5; 450 INJECTION, SOLUTION INTRAVENOUS at 12:30

## 2021-04-06 RX ADMIN — MORPHINE SULFATE 2 MG: 2 INJECTION, SOLUTION INTRAMUSCULAR; INTRAVENOUS at 15:24

## 2021-04-06 RX ADMIN — Medication 5 ML: at 15:43

## 2021-04-06 RX ADMIN — MORPHINE SULFATE 2 MG: 2 INJECTION, SOLUTION INTRAMUSCULAR; INTRAVENOUS at 12:30

## 2021-04-06 ASSESSMENT — PAIN SCALES - GENERAL: PAINLEVEL: WORST PAIN (10)

## 2021-04-06 NOTE — PROGRESS NOTES
Infusion Nursing Note:  Jennifer Cervantes presents for add on IVF/pain medication    Note: pt arrives today complaining of 9/10 pain all over, denies chest pain or any infectious symptoms. RONALDO Allan notified of labs (retic, bilirubin/LFT's). Writer discussed labs with pt and let her know Andrei is concerned we won't be able to manage her pain outpatient and pt may need to be admitted. Pt open to the idea of admission, but doesn't feel like she needs it right now. Pain down to a 4 after her last dose of morphine, pt felt well enough to discharge home.     Pain: see above    Treatment Conditions:  Lab Results   Component Value Date    HGB 8.1 04/06/2021     Lab Results   Component Value Date    WBC 12.5 04/06/2021      Lab Results   Component Value Date    ANEU 8.4 04/06/2021     Lab Results   Component Value Date     04/06/2021      Lab Results   Component Value Date     04/06/2021                   Lab Results   Component Value Date    POTASSIUM 4.1 04/06/2021           Lab Results   Component Value Date    MAG 1.7 02/21/2021            Lab Results   Component Value Date    CR 0.59 04/06/2021                   Lab Results   Component Value Date    MICAH 8.8 04/06/2021                Lab Results   Component Value Date    BILITOTAL 3.3 04/06/2021           Lab Results   Component Value Date    ALBUMIN 3.9 04/06/2021                    Lab Results   Component Value Date     04/06/2021           Lab Results   Component Value Date     04/06/2021         Intravenous Access:  Implanted Port.    Post Infusion Assessment:  Patient tolerated infusion without incident.  Blood return noted pre and post infusion.  No evidence of extravasations.  Access discontinued per protocol.    Discharge Plan:   Patient declined prescription refills.  Discharge instructions reviewed with: Patient.  Patient and/or family verbalized understanding of discharge instructions and all questions answered.  AVS to  patient via [facility].  Patient will return 4/16 for next appointment.   Patient discharged in stable condition accompanied by: self.    Afshan Diop RN, RN

## 2021-04-06 NOTE — PROGRESS NOTES
This is a recent snapshot of the patient's [facility] medical record.  For current drug dose and complete information and questions, call [phone] or In Basket pool, [facility] ([id number])  CSN Number:  [id number]

## 2021-04-06 NOTE — PROGRESS NOTES
Social Work Follow-Up  [facility]    Data/Intervention:  Patient Name:  Jennifer Cervantes  /Age:  2000 (21 year old)    Reason for Follow-Up:  Transportation     Collaborated With:    -pt  -[facility]    Intervention/Education/Resources Provided:  SW received page that pt needs ride set up for today's lab and infusion appointment. SW called pt's insurance and set up ride through [employer] with will call return ride. SW called pt and gave them ride information.     Assessment/Plan:  SW will remain available as needed.  Previously provided patient/family with writer's contact information and availability.       CARLOS Chavez,NATALIA  Hematology/Oncology Social Worker  Phone:[phone] Pager: [phone]

## 2021-04-06 NOTE — TELEPHONE ENCOUNTER
[facility] Telephone Triage Note     The following symptoms were reported:   Typical    Description:            Onset:  A few hours ago 5:00am- continued after yesterday's IVF/Pain  Location: Both arms and lower back  Character: Sharp           Intensity: 10/10     Accompanying Signs & Symptoms:  none               Chest Pain:  denies     Shortness of Breath:  denies     Fever:  denies     Chills:  denies   Cough/sore throat:  denies  Other:  Denies other symptoms     Therapies Tried and outcome: Yesterday had IVF/Pain  Tylenol, Celebrex, Oxy, warm bath, water     Needs transportation     Improved by: Minimal relief     The following provider was consulted:  7:22am snehal HIGGINS  Labs and IVF/Pain- Aparna mccallum [facility].     7:49am LM for  Abdullahi Elliott to arrange transportation    Scheduling Notified     Patient instructions and/or follow up:    Called and relayed information to Pt, who verbalized understanding for 11:30am Labs and 12:00pm IVF/Pain.

## 2021-04-07 ENCOUNTER — APPOINTMENT (OUTPATIENT)
Dept: GENERAL RADIOLOGY | Facility: CLINIC | Age: 21
End: 2021-04-07
Attending: EMERGENCY MEDICINE
Payer: COMMERCIAL

## 2021-04-07 ENCOUNTER — HOSPITAL ENCOUNTER (EMERGENCY)
Facility: CLINIC | Age: 21
Discharge: HOME | End: 2021-04-08
Attending: EMERGENCY MEDICINE | Admitting: EMERGENCY MEDICINE
Payer: COMMERCIAL

## 2021-04-07 ENCOUNTER — TELEPHONE (OUTPATIENT)
Dept: ONCOLOGY | Facility: CLINIC | Age: 21
End: 2021-04-07

## 2021-04-07 DIAGNOSIS — D57.00 SICKLE CELL PAIN CRISIS (H): ICD-10-CM

## 2021-04-07 LAB
ALBUMIN SERPL-MCNC: 4.4 G/DL (ref 3.4–5)
ALP SERPL-CCNC: 99 U/L (ref 40–150)
ALT SERPL W P-5'-P-CCNC: 199 U/L (ref 0–50)
ANION GAP SERPL CALCULATED.3IONS-SCNC: 10 MMOL/L (ref 3–14)
AST SERPL W P-5'-P-CCNC: 160 U/L (ref 0–45)
BASOPHILS # BLD AUTO: 0.1 10E9/L (ref 0–0.2)
BASOPHILS NFR BLD AUTO: 0.8 %
BILIRUB SERPL-MCNC: 3.8 MG/DL (ref 0.2–1.3)
BUN SERPL-MCNC: 15 MG/DL (ref 7–30)
CALCIUM SERPL-MCNC: 9.4 MG/DL (ref 8.5–10.1)
CHLORIDE SERPL-SCNC: 105 MMOL/L (ref 94–109)
CO2 SERPL-SCNC: 19 MMOL/L (ref 20–32)
CREAT SERPL-MCNC: 0.7 MG/DL (ref 0.52–1.04)
DIFFERENTIAL METHOD BLD: ABNORMAL
EOSINOPHIL # BLD AUTO: 0.2 10E9/L (ref 0–0.7)
EOSINOPHIL NFR BLD AUTO: 1.4 %
ERYTHROCYTE [DISTWIDTH] IN BLOOD BY AUTOMATED COUNT: 22.4 % (ref 10–15)
GFR SERPL CREATININE-BSD FRML MDRD: >90 ML/MIN/{1.73_M2}
GLUCOSE SERPL-MCNC: 101 MG/DL (ref 70–99)
HCT VFR BLD AUTO: 24.2 % (ref 35–47)
HGB BLD-MCNC: 8.3 G/DL (ref 11.7–15.7)
IMM GRANULOCYTES # BLD: 0.1 10E9/L (ref 0–0.4)
IMM GRANULOCYTES NFR BLD: 0.7 %
LACTATE BLD-SCNC: 1 MMOL/L (ref 0.7–2)
LYMPHOCYTES # BLD AUTO: 1.6 10E9/L (ref 0.8–5.3)
LYMPHOCYTES NFR BLD AUTO: 10.7 %
MCH RBC QN AUTO: 33.1 PG (ref 26.5–33)
MCHC RBC AUTO-ENTMCNC: 34.3 G/DL (ref 31.5–36.5)
MCV RBC AUTO: 96 FL (ref 78–100)
MONOCYTES # BLD AUTO: 0.6 10E9/L (ref 0–1.3)
MONOCYTES NFR BLD AUTO: 3.9 %
NEUTROPHILS # BLD AUTO: 12 10E9/L (ref 1.6–8.3)
NEUTROPHILS NFR BLD AUTO: 82.5 %
NRBC # BLD AUTO: 0.3 10*3/UL
NRBC BLD AUTO-RTO: 2 /100
PLATELET # BLD AUTO: 259 10E9/L (ref 150–450)
POTASSIUM SERPL-SCNC: 4.3 MMOL/L (ref 3.4–5.3)
PROT SERPL-MCNC: 9.4 G/DL (ref 6.8–8.8)
RBC # BLD AUTO: 2.51 10E12/L (ref 3.8–5.2)
SODIUM SERPL-SCNC: 134 MMOL/L (ref 133–144)
TROPONIN I SERPL-MCNC: <0.015 UG/L (ref 0–0.04)
WBC # BLD AUTO: 14.5 10E9/L (ref 4–11)

## 2021-04-07 PROCEDURE — 96361 HYDRATE IV INFUSION ADD-ON: CPT | Performed by: EMERGENCY MEDICINE

## 2021-04-07 PROCEDURE — 84484 ASSAY OF TROPONIN QUANT: CPT | Performed by: EMERGENCY MEDICINE

## 2021-04-07 PROCEDURE — 80053 COMPREHEN METABOLIC PANEL: CPT | Performed by: EMERGENCY MEDICINE

## 2021-04-07 PROCEDURE — 250N000013 HC RX MED GY IP 250 OP 250 PS 637: Performed by: EMERGENCY MEDICINE

## 2021-04-07 PROCEDURE — U0005 INFEC AGEN DETEC AMPLI PROBE: HCPCS | Performed by: EMERGENCY MEDICINE

## 2021-04-07 PROCEDURE — 99285 EMERGENCY DEPT VISIT HI MDM: CPT | Mod: 25 | Performed by: EMERGENCY MEDICINE

## 2021-04-07 PROCEDURE — 71045 X-RAY EXAM CHEST 1 VIEW: CPT | Mod: 26 | Performed by: RADIOLOGY

## 2021-04-07 PROCEDURE — 250N000011 HC RX IP 250 OP 636: Performed by: EMERGENCY MEDICINE

## 2021-04-07 PROCEDURE — 258N000003 HC RX IP 258 OP 636: Performed by: EMERGENCY MEDICINE

## 2021-04-07 PROCEDURE — 93005 ELECTROCARDIOGRAM TRACING: CPT | Performed by: EMERGENCY MEDICINE

## 2021-04-07 PROCEDURE — U0003 INFECTIOUS AGENT DETECTION BY NUCLEIC ACID (DNA OR RNA); SEVERE ACUTE RESPIRATORY SYNDROME CORONAVIRUS 2 (SARS-COV-2) (CORONAVIRUS DISEASE [COVID-19]), AMPLIFIED PROBE TECHNIQUE, MAKING USE OF HIGH THROUGHPUT TECHNOLOGIES AS DESCRIBED BY CMS-2020-01-R: HCPCS | Performed by: EMERGENCY MEDICINE

## 2021-04-07 PROCEDURE — 83605 ASSAY OF LACTIC ACID: CPT | Performed by: EMERGENCY MEDICINE

## 2021-04-07 PROCEDURE — 96374 THER/PROPH/DIAG INJ IV PUSH: CPT | Performed by: EMERGENCY MEDICINE

## 2021-04-07 PROCEDURE — 71045 X-RAY EXAM CHEST 1 VIEW: CPT

## 2021-04-07 PROCEDURE — 93010 ELECTROCARDIOGRAM REPORT: CPT | Performed by: EMERGENCY MEDICINE

## 2021-04-07 PROCEDURE — 85025 COMPLETE CBC W/AUTO DIFF WBC: CPT | Performed by: EMERGENCY MEDICINE

## 2021-04-07 PROCEDURE — 96375 TX/PRO/DX INJ NEW DRUG ADDON: CPT | Performed by: EMERGENCY MEDICINE

## 2021-04-07 PROCEDURE — 999N000127 HC STATISTIC PERIPHERAL IV START W US GUIDANCE

## 2021-04-07 RX ORDER — DIPHENHYDRAMINE HCL 25 MG
25 CAPSULE ORAL ONCE
Status: COMPLETED | OUTPATIENT
Start: 2021-04-07 | End: 2021-04-07

## 2021-04-07 RX ORDER — SODIUM CHLORIDE, SODIUM LACTATE, POTASSIUM CHLORIDE, CALCIUM CHLORIDE 600; 310; 30; 20 MG/100ML; MG/100ML; MG/100ML; MG/100ML
INJECTION, SOLUTION INTRAVENOUS ONCE
Status: COMPLETED | OUTPATIENT
Start: 2021-04-07 | End: 2021-04-08

## 2021-04-07 RX ORDER — ONDANSETRON 2 MG/ML
4 INJECTION INTRAMUSCULAR; INTRAVENOUS EVERY 30 MIN PRN
Status: DISCONTINUED | OUTPATIENT
Start: 2021-04-07 | End: 2021-04-08 | Stop reason: HOSPADM

## 2021-04-07 RX ORDER — MORPHINE SULFATE 2 MG/ML
2 INJECTION, SOLUTION INTRAMUSCULAR; INTRAVENOUS
Status: COMPLETED | OUTPATIENT
Start: 2021-04-07 | End: 2021-04-08

## 2021-04-07 RX ADMIN — MORPHINE SULFATE 2 MG: 2 INJECTION, SOLUTION INTRAMUSCULAR; INTRAVENOUS at 22:22

## 2021-04-07 RX ADMIN — DIPHENHYDRAMINE HYDROCHLORIDE 25 MG: 25 CAPSULE ORAL at 22:23

## 2021-04-07 RX ADMIN — ONDANSETRON 4 MG: 2 INJECTION INTRAMUSCULAR; INTRAVENOUS at 22:22

## 2021-04-07 RX ADMIN — SODIUM CHLORIDE, POTASSIUM CHLORIDE, SODIUM LACTATE AND CALCIUM CHLORIDE: 600; 310; 30; 20 INJECTION, SOLUTION INTRAVENOUS at 22:22

## 2021-04-07 NOTE — PROGRESS NOTES
This is a recent snapshot of the patient's [facility] medical record.  For current drug dose and complete information and questions, call [phone] or In Basket pool, [facility] ([id number])  CSN Number:  [id number]

## 2021-04-07 NOTE — TELEPHONE ENCOUNTER
Pt called in to triage requesting IVF pain meds for all over pain, but mostly low back pain rated 9/10 x2 days. Stated last took prn oxy 15 mg,tylenol and scheduled oxycontin 10 mg and celebrex at 7-8 am this morning without relief. Denied any fevers, chills, cough, sob, chest pain or other symptoms. Pt's last infusion was 4/6, stated pain did decrease to 4/10 after infusion, last clinic visit 4/2 with follow-up on 4/16 with Dr. Duncan. Pt denied being out of home medications and needing any refills today. Paged Andrei who approved ivf pain meds today, no labs needed.    Informed pt she is on the infusion wait list but if she does not hear back about an apt in the next hour or two and pain is increasing she should head into the ED, she verbalized understanding.

## 2021-04-07 NOTE — TELEPHONE ENCOUNTER
11 am patient call received.  Asked status of possible IV pain meds for today.  Informed that we have no further updates and asked patient to go to ED to which she replied OK.

## 2021-04-08 ENCOUNTER — HOME INFUSION (PRE-WILLOW HOME INFUSION) (OUTPATIENT)
Dept: PHARMACY | Facility: CLINIC | Age: 21
End: 2021-04-08

## 2021-04-08 VITALS
HEART RATE: 112 BPM | BODY MASS INDEX: 27.98 KG/M2 | TEMPERATURE: 97.8 F | OXYGEN SATURATION: 97 % | RESPIRATION RATE: 18 BRPM | SYSTOLIC BLOOD PRESSURE: 120 MMHG | WEIGHT: 163 LBS | DIASTOLIC BLOOD PRESSURE: 76 MMHG

## 2021-04-08 LAB
INTERPRETATION ECG - MUSE: NORMAL
LABORATORY COMMENT REPORT: NORMAL
SARS-COV-2 RNA RESP QL NAA+PROBE: NEGATIVE
SPECIMEN SOURCE: NORMAL

## 2021-04-08 PROCEDURE — 96376 TX/PRO/DX INJ SAME DRUG ADON: CPT | Performed by: EMERGENCY MEDICINE

## 2021-04-08 PROCEDURE — 250N000011 HC RX IP 250 OP 636: Performed by: EMERGENCY MEDICINE

## 2021-04-08 RX ADMIN — MORPHINE SULFATE 2 MG: 2 INJECTION, SOLUTION INTRAMUSCULAR; INTRAVENOUS at 01:23

## 2021-04-08 RX ADMIN — MORPHINE SULFATE 2 MG: 2 INJECTION, SOLUTION INTRAMUSCULAR; INTRAVENOUS at 00:27

## 2021-04-08 ASSESSMENT — ENCOUNTER SYMPTOMS
ABDOMINAL PAIN: 0
PALPITATIONS: 0
DIFFICULTY URINATING: 0
SHORTNESS OF BREATH: 0
VOMITING: 0
COUGH: 0
CHILLS: 0
FEVER: 0
DIAPHORESIS: 0
NAUSEA: 0
CHEST TIGHTNESS: 0
ARTHRALGIAS: 1
DIARRHEA: 0
MYALGIAS: 1
DYSURIA: 0
DIZZINESS: 0

## 2021-04-08 NOTE — ED TRIAGE NOTES
Pt states sickle cell pain flare up began today around 1600. She also states she feels very dizzy and weak which is abnormal during flare up.

## 2021-04-08 NOTE — ED PROVIDER NOTES
[facility]ERGENCY DEPARTMENT ([facility])  4/07/21     History     Chief Complaint   Patient presents with     Generalized Weakness     Sickle Cell Pain Crisis     The history is provided by the patient and medical records.     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 Patient was seen in the emergency department on 4/3/2021 for sickle cell pain flare with failure of outpatient home meds.  She was treated lactated Ringer's, 3 doses of morphine 2 mg before being discharged.  Since then she has followed up with Hematology, has had 2 infusion center visits, last infusion was yesterday. Today patient was at her usual state of health, getting her hair done when she developed pain flare with diffuse body aches and joint pain. She went to use the bathroom and developed sudden onset of generalized weakness had to sit on the floor. She is on [id number] mg, Oxycodone 15 mg every 6 hours as needed for severe pain.  She didn't have her home medications because they were at home and she was far from home.  She came here before the pain got too debilitating.She has diffuse pain similar to prior sickle cell pain flare. She has pain all over her body. No focal chest pain or shortness of breath. She has problems with iron overload, is on Jadenu to help her chelate iron. Her hematologist is reluctant to give her further transfusions. No chance of pregnancy. She is still on anticoagulation, no missed doses.  She states that on 3/25/2021 her right arm had swollen with multiple palpable cords, was found to have multiple DVTs in her right upper extremity. No lower extremity edema. She does have a port in place for infusions, but this is for infusion center use only as she  requires peripheral IV starts for other services.   Past Medical History  Past Medical History:   Diagnosis Date     Anemia      Anxiety      Bleeding disorder (H)      Blood clotting disorder (H)      Cerebral infarction (H) 2015     Cognitive developmental delay     low IQ. 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 (H)     right hand contractures     Iron overload due to repeated red blood cell transfusions      Migraines      Multiple subsegmental pulmonary emboli without acute cor pulmonale (H) 02/01/2021     Oppositional defiant behavior      Superficial venous thrombosis of arm, right 03/25/2021     Uncomplicated asthma      Past Surgical History:   Procedure Laterality Date     AS INSERT TUNNELED CV 2 CATH W/O PORT/PUMP       C BREAST REDUCTION (INCLUDES LIPO) TIER 3 Bilateral 04/23/2019     CHOLECYSTECTOMY       IR CVC NON TUNNEL PLACEMENT  04/07/2020     REPAIR TENDON ELBOW Right 10/02/2019    Procedure: Right Elbow Flexor Lengthening, Flexor Pronator Slide Of Wrist and Finger, Thumb Adductor Lengthening;  Surgeon: Anai Franco MD;  Location: [facility] OR     TONSILLECTOMY Bilateral 10/02/2019    Procedure: Bilateral Tonsillectomy;  Surgeon: Farhana Guy MD;  Location: [facility] OR     acetaminophen (TYLENOL) 325 MG tablet  albuterol (PROAIR HFA/PROVENTIL HFA/VENTOLIN HFA) 108 (90 Base) MCG/ACT inhaler  albuterol (PROVENTIL) (2.5 MG/3ML) 0.083% neb solution  Apixaban Starter Pack (ELIQUIS DVT/PE STARTER PACK) 5 MG TBPK  ARIPiprazole (ABILIFY) 2 MG tablet  aspirin (ASPIRIN) 81 MG EC tablet  budesonide-formoterol (SYMBICORT) 160-4.5 MCG/ACT Inhaler  celecoxib (CELEBREX) 100 MG capsule  diclofenac (VOLTAREN) 1 % topical gel  diphenhydrAMINE (BENADRYL) 25 MG capsule  EPINEPHrine (ANY BX GENERIC EQUIV) 0.3 MG/0.3ML injection 2-pack  Hydroxyurea 1000 MG TABS  JADENU 360  MG tablet  medroxyPROGESTERone (DEPO-PROVERA) 150 MG/ML IM injection  naloxone (NARCAN) 4 MG/0.1ML nasal spray  ondansetron (ZOFRAN) 8 MG tablet  oxyCODONE (OXYCONTIN) 10 MG 12 hr tablet  oxyCODONE IR (ROXICODONE) 15 MG tablet  sertraline (ZOLOFT) 100 MG tablet      Allergies   Allergen Reactions     Fish-Derived Products Hives     Seafood Hives     Contrast Dye      Diagnostic X-Ray Materials      Gadolinium      Family History  Family History   Problem Relation Age of Onset     Sickle Cell Trait Mother      Hypertension Mother      Asthma Mother      Sickle Cell Trait Father      Social History   Social History     Tobacco Use     Smoking status: Never Smoker     Smokeless tobacco: Never Used   Substance Use Topics     Alcohol use: Not Currently     Alcohol/week: 0.0 standard drinks     Drug use: Never      Past medical history, past surgical history, medications, allergies, family history, and social history were reviewed with the patient. No additional pertinent items.       Review of Systems   Constitutional: Negative for chills, diaphoresis and fever.   HENT: Negative for congestion.    Eyes: Negative for visual disturbance.   Respiratory: Negative for cough, chest tightness and shortness of breath.    Cardiovascular: Negative for chest pain and palpitations.   Gastrointestinal: Negative for abdominal pain, diarrhea, nausea and vomiting.   Genitourinary: Negative for difficulty urinating and dysuria.   Musculoskeletal: Positive for arthralgias and myalgias.   Neurological: Negative for dizziness and syncope.   Psychiatric/Behavioral: Negative for behavioral problems and suicidal ideas.     A complete review of systems was performed with pertinent positives and negatives noted in the HPI, and all other systems negative.    Physical Exam   BP: 131/71  Pulse: 122  Temp: 97.8  F (36.6  C)  Resp: 18  Weight: 73.9 kg (163 lb)  SpO2: 96 %  Physical Exam  Constitutional:       General: She is not in acute distress.      Appearance: She is not diaphoretic.   HENT:      Head: Normocephalic.      Mouth/Throat:      Pharynx: No oropharyngeal exudate.   Eyes:      Extraocular Movements: Extraocular movements intact.   Neck:      Musculoskeletal: Neck supple.   Cardiovascular:      Rate and Rhythm: Normal rate and regular rhythm.      Heart sounds: Normal heart sounds.   Pulmonary:      Effort: No respiratory distress.      Breath sounds: Normal breath sounds.   Abdominal:      General: There is no distension.      Palpations: Abdomen is soft.      Tenderness: There is no abdominal tenderness.   Musculoskeletal:         General: No deformity.      Comments: Right upper extremity contracture chronic   Skin:     General: Skin is warm and dry.   Neurological:      Mental Status: She is alert.      Comments: alert   Psychiatric:         Behavior: Behavior normal.       ED Course      Procedures             EKG Interpretation:      Interpreted by Mykel Luz DO  Time reviewed: 2100  Symptoms at time of EKG: Weakness, chest pain  Rhythm: normal sinus   Rate: 121  Axis: normal  Ectopy: none  Conduction: normal  ST Segments/ T Waves: No ST-T wave changes  Q Waves: none      Clinical Impression: Tachycardia, otherwise normal EKG      Results for orders placed or performed during the hospital encounter of 04/07/21   XR Chest Port 1 View     Status: None    Narrative    EXAM: XR CHEST PORT 1 VW  LOCATION: [facility]  DATE/TIME: 4/7/2021 9:57 PM    INDICATION: Weakness.  COMPARISON: 03/10/2021.      Impression    IMPRESSION: Left-sided portacatheter. Lungs clear. Clip in the right midabdomen.   Lactate for Sepsis Protocol     Status: None   Result Value Ref Range    Lactate for Sepsis Protocol 1.0 0.7 - 2.0 mmol/L   CBC with platelets differential     Status: Abnormal   Result Value Ref Range    WBC 14.5 (H) 4.0 - 11.0 10e9/L    RBC Count 2.51 (L) 3.8 - 5.2 10e12/L    Hemoglobin 8.3 (L) 11.7 - 15.7 g/dL    Hematocrit 24.2 (L)  35.0 - 47.0 %    MCV 96 78 - 100 fl    MCH 33.1 (H) 26.5 - 33.0 pg    MCHC 34.3 31.5 - 36.5 g/dL    RDW 22.4 (H) 10.0 - 15.0 %    Platelet Count 259 150 - 450 10e9/L    Diff Method Automated Method     % Neutrophils 82.5 %    % Lymphocytes 10.7 %    % Monocytes 3.9 %    % Eosinophils 1.4 %    % Basophils 0.8 %    % Immature Granulocytes 0.7 %    Nucleated RBCs 2 (H) 0 /100    Absolute Neutrophil 12.0 (H) 1.6 - 8.3 10e9/L    Absolute Lymphocytes 1.6 0.8 - 5.3 10e9/L    Absolute Monocytes 0.6 0.0 - 1.3 10e9/L    Absolute Eosinophils 0.2 0.0 - 0.7 10e9/L    Absolute Basophils 0.1 0.0 - 0.2 10e9/L    Abs Immature Granulocytes 0.1 0 - 0.4 10e9/L    Absolute Nucleated RBC 0.3    Comprehensive metabolic panel     Status: Abnormal   Result Value Ref Range    Sodium 134 133 - 144 mmol/L    Potassium 4.3 3.4 - 5.3 mmol/L    Chloride 105 94 - 109 mmol/L    Carbon Dioxide 19 (L) 20 - 32 mmol/L    Anion Gap 10 3 - 14 mmol/L    Glucose 101 (H) 70 - 99 mg/dL    Urea Nitrogen 15 7 - 30 mg/dL    Creatinine 0.70 0.52 - 1.04 mg/dL    GFR Estimate >90 >60 mL/min/[1.73_m2]    GFR Estimate If Black >90 >60 mL/min/[1.73_m2]    Calcium 9.4 8.5 - 10.1 mg/dL    Bilirubin Total 3.8 (H) 0.2 - 1.3 mg/dL    Albumin 4.4 3.4 - 5.0 g/dL    Protein Total 9.4 (H) 6.8 - 8.8 g/dL    Alkaline Phosphatase 99 40 - 150 U/L     (H) 0 - 50 U/L     (H) 0 - 45 U/L   Troponin I     Status: None   Result Value Ref Range    Troponin I ES <0.015 0.000 - 0.045 ug/L   EKG 12-lead, tracing only     Status: None (Preliminary result)   Result Value Ref Range    Interpretation ECG Click View Image link to view waveform and result             Results for orders placed or performed during the hospital encounter of 04/07/21   XR Chest Port 1 View     Status: None    Narrative    EXAM: XR CHEST PORT 1 VW  LOCATION: [facility]  DATE/TIME: 4/7/2021 9:57 PM    INDICATION: Weakness.  COMPARISON: 03/10/2021.      Impression    IMPRESSION: Left-sided  portacatheter. Lungs clear. Clip in the right midabdomen.   Lactate for Sepsis Protocol     Status: None   Result Value Ref Range    Lactate for Sepsis Protocol 1.0 0.7 - 2.0 mmol/L   CBC with platelets differential     Status: Abnormal   Result Value Ref Range    WBC 14.5 (H) 4.0 - 11.0 10e9/L    RBC Count 2.51 (L) 3.8 - 5.2 10e12/L    Hemoglobin 8.3 (L) 11.7 - 15.7 g/dL    Hematocrit 24.2 (L) 35.0 - 47.0 %    MCV 96 78 - 100 fl    MCH 33.1 (H) 26.5 - 33.0 pg    MCHC 34.3 31.5 - 36.5 g/dL    RDW 22.4 (H) 10.0 - 15.0 %    Platelet Count 259 150 - 450 10e9/L    Diff Method Automated Method     % Neutrophils 82.5 %    % Lymphocytes 10.7 %    % Monocytes 3.9 %    % Eosinophils 1.4 %    % Basophils 0.8 %    % Immature Granulocytes 0.7 %    Nucleated RBCs 2 (H) 0 /100    Absolute Neutrophil 12.0 (H) 1.6 - 8.3 10e9/L    Absolute Lymphocytes 1.6 0.8 - 5.3 10e9/L    Absolute Monocytes 0.6 0.0 - 1.3 10e9/L    Absolute Eosinophils 0.2 0.0 - 0.7 10e9/L    Absolute Basophils 0.1 0.0 - 0.2 10e9/L    Abs Immature Granulocytes 0.1 0 - 0.4 10e9/L    Absolute Nucleated RBC 0.3    Comprehensive metabolic panel     Status: Abnormal   Result Value Ref Range    Sodium 134 133 - 144 mmol/L    Potassium 4.3 3.4 - 5.3 mmol/L    Chloride 105 94 - 109 mmol/L    Carbon Dioxide 19 (L) 20 - 32 mmol/L    Anion Gap 10 3 - 14 mmol/L    Glucose 101 (H) 70 - 99 mg/dL    Urea Nitrogen 15 7 - 30 mg/dL    Creatinine 0.70 0.52 - 1.04 mg/dL    GFR Estimate >90 >60 mL/min/[1.73_m2]    GFR Estimate If Black >90 >60 mL/min/[1.73_m2]    Calcium 9.4 8.5 - 10.1 mg/dL    Bilirubin Total 3.8 (H) 0.2 - 1.3 mg/dL    Albumin 4.4 3.4 - 5.0 g/dL    Protein Total 9.4 (H) 6.8 - 8.8 g/dL    Alkaline Phosphatase 99 40 - 150 U/L     (H) 0 - 50 U/L     (H) 0 - 45 U/L   Troponin I     Status: None   Result Value Ref Range    Troponin I ES <0.015 0.000 - 0.045 ug/L   EKG 12-lead, tracing only     Status: None (Preliminary result)   Result Value Ref Range     Interpretation ECG Click View Image link to view waveform and result      Medications   ondansetron (ZOFRAN) injection 4 mg (4 mg Intravenous Given 4/7/21 2222)   lactated ringers infusion (0 mLs Intravenous Stopped 4/8/21 0114)   diphenhydrAMINE (BENADRYL) capsule 25 mg (25 mg Oral Given 4/7/21 2223)   morphine (PF) injection 2 mg (2 mg Intravenous Given 4/8/21 0123)        Assessments & Plan (with Medical Decision Making)   21-year-old female presents to us with a chief complaint of weakness and sickle cell pain.  Chest x-ray today was clear.  Patient was given her normal protocol for pain control which did significantly improve her symptoms.  Labs are reviewed and are at the patient's baseline.  Troponin was negative.  She is feeling better and comfortable with discharge home at this time.    I have reviewed the nursing notes. I have reviewed the findings, diagnosis, plan and need for follow up with the patient.    New Prescriptions    No medications on file       Final diagnoses:   Sickle cell pain crisis (H)   I, Bambi Tejeda, am serving as a trained medical scribe to document services personally performed by Mykel Luz DO based on the provider's statements to me on April 7, 2021.  This document has been checked and approved by the attending provider.    I, Mykel Luz DO, was physically present and have reviewed and verified the accuracy of this note documented by Bambi Tejeda, medical scribe.       --  Mykel Luz DO  [facility] EMERGENCY DEPARTMENT  4/7/2021     Mykel Luz DO  04/08/21 0128

## 2021-04-08 NOTE — TELEPHONE ENCOUNTER
RECORDS RECEIVED FROM: internal/ce    DATE RECEIVED: 6.11.21    NOTES STATUS DETAILS   OFFICE NOTE from referring provider na Self referred    OFFICE NOTE from other specialist Internal  1.29.21 Ernesto      DISCHARGE SUMMARY from hospital na    DISCHARGE REPORT from the ER Internal  3.21.21 Gama   2.1.21 [place]    MEDICATION LIST Internal     IMAGING  (NEED IMAGES AND REPORTS)     CT SCAN na    CHEST XRAY (CXR) ce/Internal  Internal - 4.7.21, 3.10.21, 3.8.21, 3/6/21, 2.28.21, 2.18.21, 2.2.21 more in epic    [facility]- 12.28.10    TESTS     PULMONARY FUNCTION TESTING (PFT) Internal  Scheduled 6.11.21

## 2021-04-09 ENCOUNTER — TELEPHONE (OUTPATIENT)
Dept: ONCOLOGY | Facility: CLINIC | Age: 21
End: 2021-04-09

## 2021-04-09 ENCOUNTER — VIRTUAL VISIT (OUTPATIENT)
Dept: ONCOLOGY | Facility: CLINIC | Age: 21
End: 2021-04-09
Attending: PHYSICIAN ASSISTANT
Payer: COMMERCIAL

## 2021-04-09 ENCOUNTER — HOSPITAL ENCOUNTER (OUTPATIENT)
Age: 21
End: 2021-04-09
Payer: COMMERCIAL

## 2021-04-09 DIAGNOSIS — Z53.9 ERRONEOUS ENCOUNTER--DISREGARD: Primary | ICD-10-CM

## 2021-04-09 PROCEDURE — 999N001193 HC VIDEO/TELEPHONE VISIT; NO CHARGE

## 2021-04-09 NOTE — TELEPHONE ENCOUNTER
[facility] Triage Telephone Call    Called by patient to discuss hospital admission with RNCC.  Refused admission until she has had the opportunity to talk with her.

## 2021-04-09 NOTE — PROGRESS NOTES
This is a recent snapshot of the patient's [facility] medical record.  For current drug dose and complete information and questions, call [phone] or In Basket pool, fv home infusion ([id number])  CSN Number:  [id number]

## 2021-04-09 NOTE — TELEPHONE ENCOUNTER
"Writer placed call to Jennifer in response to call placed to triage. Jennifer stated that she had not talked to Jennifer yet. Writer advised that her phone went straight to [facility] after we set up her appointment earlier and she missed her appointment. Jennifer was aware of her appointment to take place at 9:30 through conversation with writer but only called triage back at 1:45 pm to ask about not getting a call back. Writer advised Jennifer that I had tried calling more than 15 times, 16 per phone records, screen shot below. Jennifer stated \"I'm not going to argue with you or anyone else, I'll handle this on my own\". Writer offered to set up additional in-person visit if needed, advised of appointment with Dr Duncan on 4/16, but that at this point, we advise ER visit for admission as out-patient IVF/pain meds would be inappropriate due to increased needs and that she needs consistent IV pain management in-patient. Jennifer voiced understanding and discontinued call.          "

## 2021-04-09 NOTE — TELEPHONE ENCOUNTER
Writer received call from Jennifer regarding continued pain in lower back despite using home rescue medications and heat therapy. Jennifer has had a drastic increase in requests for IVF/pain meds and/or ER visits for increased pain in past couple of months with admission from 3/4-3/16. Discussed with Andrei and both agree admission would be more appropriate at this point vs outpatient intervention. Added for virtual appointment and contacted patient placement for admission. Pager number for Andrei provided and hospitalist will page for hand off. Requested 7D for bed.

## 2021-04-11 NOTE — PROGRESS NOTES
Oncology/Hematology Visit Note  Mar 25, 2021    Reason for Visit: Follow up of sickle cell hgbSS disease     History of Present Illness: HgbSS complicated by frequent pain crises (acute and chronic components), history of stroke leading to significant cognitive delays and right upper extremity hemiparesis, iron overload 2/2 chronic transfusions as secondary ppx post-CVA, anxiety/depression, asthma. Please see Dr. Duncan's detailed note from 2/28/20 for complete hematologic history. She is currently on Hydrea and Jadenu.     She was admitted 2/1/21-2/3/21 with a new PE, started on Rivaroxaban.      She was seen today for hematology follow-up and to evaluate right arm swelling.     Interval History:  Patient notes that she has had right arm swelling for the last 3 days.  She denies any injury to the area.  She notes that the pain in her right arm has also increased.  She reports that she has not missed any doses of Xarelto and has been consistently taking it with her dinner.  She denies any bleeding issues or dyspnea.  She denies any chest pain or leg swelling.  She denies other concerns.    Current Outpatient Medications   Medication Sig Dispense Refill     Apixaban Starter Pack (ELIQUIS DVT/PE STARTER PACK) 5 MG TBPK Take 10 mg by mouth 2 times daily for 7 days, THEN 5 mg 2 times daily for 23 days. 74 each 0     acetaminophen (TYLENOL) 325 MG tablet Take 2 tablets (650 mg) by mouth every 6 hours as needed for mild pain 120 tablet 3     albuterol (PROAIR HFA/PROVENTIL HFA/VENTOLIN HFA) 108 (90 Base) MCG/ACT inhaler Inhale 2 puffs into the lungs every 6 hours as needed for shortness of breath / dyspnea or wheezing 8.5 g 3     albuterol (PROVENTIL) (2.5 MG/3ML) 0.083% neb solution Take 1 vial (2.5 mg) by nebulization every 6 hours as needed for shortness of breath / dyspnea or wheezing 12 mL 4     ARIPiprazole (ABILIFY) 2 MG tablet Take 1 tablet (2 mg) by mouth daily 30 tablet 3     aspirin (ASPIRIN) 81 MG EC tablet  Take 1 tablet (81 mg) by mouth daily . HOLD while on Xarelto 90 tablet 3     budesonide-formoterol (SYMBICORT) 160-4.5 MCG/ACT Inhaler Inhale 2 puffs into the lungs 2 times daily 10.2 g 3     celecoxib (CELEBREX) 100 MG capsule Take 1 capsule (100 mg) by mouth 2 times daily 60 capsule 3     diclofenac (VOLTAREN) 1 % topical gel Apply 4 g topically 4 times daily as needed for moderate pain Apply to back, legs, and arms for pain 150 g 3     diphenhydrAMINE (BENADRYL) 25 MG capsule Take 1-2 capsules (25-50 mg) by mouth nightly as needed for sleep 60 capsule 3     EPINEPHrine (ANY BX GENERIC EQUIV) 0.3 MG/0.3ML injection 2-pack Inject 0.3 mLs (0.3 mg) into the muscle as needed for anaphylaxis (Patient not taking: Reported on 4/2/2021) 1 each 1     Hydroxyurea 1000 MG TABS Take 2,000 mg by mouth daily 60 tablet 3     JADENU 360 MG tablet Take 4 tablets (1,440 mg) by mouth every evening 30 tablet 0     medroxyPROGESTERone (DEPO-PROVERA) 150 MG/ML IM injection Inject 150 mg into the muscle       naloxone (NARCAN) 4 MG/0.1ML nasal spray Spray 1 spray (4 mg) into one nostril alternating nostrils once as needed for opioid reversal every 2-3 minutes until assistance arrives (Patient not taking: Reported on 4/2/2021) 0.2 mL 1     ondansetron (ZOFRAN) 8 MG tablet        oxyCODONE (OXYCONTIN) 10 MG 12 hr tablet Take 1 tablet (10 mg) by mouth every 12 hours 60 tablet 0     oxyCODONE IR (ROXICODONE) 15 MG tablet Take 1 tablet (15 mg) by mouth every 6 hours as needed for severe pain 60 tablet 0     sertraline (ZOLOFT) 100 MG tablet Take 2 tablets (200 mg) by mouth daily 180 tablet 3     Physical Examination:  /86 (BP Location: Right arm, Patient Position: Sitting, Cuff Size: Adult Regular)   Pulse 110   Temp 98.2  F (36.8  C) (Oral)   Resp 16   Wt 73.6 kg (162 lb 4.8 oz)   SpO2 97%   BMI 27.86 kg/m    Wt Readings from Last 10 Encounters:   04/07/21 73.9 kg (163 lb)   04/06/21 74.8 kg (164 lb 12.8 oz)   04/03/21 75.3 kg  (166 lb)   21 75.4 kg (166 lb 4.8 oz)   21 74.4 kg (164 lb 1.6 oz)   21 74.8 kg (165 lb)   21 74.8 kg (165 lb)   21 74.8 kg (165 lb)   21 73.9 kg (162 lb 14.4 oz)   21 73.6 kg (162 lb 4.8 oz)   Constitutional: Well-appearing female in no acute distress.  Eyes: EOMI. No scleral icterus.  Respiratory: Breathing is not labored.   Neurologic: Cranial nerves II through XII are grossly intact. Sequela of stroke with right side weakness.  Musculoskeletal: Contracture noted of the right hand. Brace on right leg. Right forearm is slightly larger than left forearm with trace pitting edema noted in the right forearm.  Skin: No rashes, petechiae, or bruising noted on exposed skin.    Laboratory Data:   3/25/2021 12:41   Sodium 138   Potassium 3.6   Chloride 112 (H)   Carbon Dioxide 22   Urea Nitrogen 7   Creatinine 0.47 (L)   GFR Estimate >90   GFR Estimate If Black >90   Calcium 9.0   Anion Gap 4   Albumin 4.1   Protein Total 8.4   Bilirubin Total 2.0 (H)   Alkaline Phosphatase 72    (H)   AST 95 (H)   Glucose 82   WBC 6.2   Hemoglobin 7.4 (L)   Hematocrit 21.9 (L)   Platelet Count 282   RBC Count 2.21 (L)   MCV 99   MCH 33.5 (H)   MCHC 33.8   RDW 23.7 (H)   Diff Method Automated Method   % Neutrophils 46.1   % Lymphocytes 37.9   % Monocytes 7.9   % Eosinophils 5.8   % Basophils 2.1   % Immature Granulocytes 0.2   Nucleated RBCs 4 (H)   Absolute Neutrophil 2.9   Absolute Lymphocytes 2.3   Absolute Monocytes 0.5   Absolute Eosinophils 0.4   Absolute Basophils 0.1   Abs Immature Granulocytes 0.0   Absolute Nucleated RBC 0.2   % Retic 11.4 (H)   Absolute Retic 251.9 (H)     Imaging:  Right upper extremity ultrasound today shows the followin. Right innominate vein is not identified and is likely thrombosed,  age indeterminate but potentially chronic.  2. Right neck cyst measures 2.3 x 2.6 x 1.8 cm.  3. Superficial thrombus in the right cephalic vein.    Assessment and  Plan:  Bilateral PE, diagnosed 2/1/21, on Rivaroxaban 20 mg daily. Symptoms resolved. No bleeding concerns. Given new RUE DVT, I discussed her case with Dr. Duncan and will switch her to Eliquis.     Sickle Cell Disease. HgbSS, has been having more issues with pain recently with more ED visits. She is receiving IV fluids and pain medication today. She will continue close follow-up in our clinic.     Patricia Hodge PA-C  [facility]  [address] [place], [place] [id number] [phone]

## 2021-04-12 ENCOUNTER — ONCOLOGY VISIT (OUTPATIENT)
Dept: ONCOLOGY | Facility: CLINIC | Age: 21
End: 2021-04-12
Attending: PHYSICIAN ASSISTANT
Payer: COMMERCIAL

## 2021-04-12 ENCOUNTER — INFUSION THERAPY VISIT (OUTPATIENT)
Dept: ONCOLOGY | Facility: CLINIC | Age: 21
End: 2021-04-12
Attending: PEDIATRICS
Payer: COMMERCIAL

## 2021-04-12 ENCOUNTER — PATIENT OUTREACH (OUTPATIENT)
Dept: CARE COORDINATION | Facility: CLINIC | Age: 21
End: 2021-04-12

## 2021-04-12 ENCOUNTER — TELEPHONE (OUTPATIENT)
Dept: ONCOLOGY | Facility: CLINIC | Age: 21
End: 2021-04-12

## 2021-04-12 VITALS
TEMPERATURE: 98.4 F | BODY MASS INDEX: 28.44 KG/M2 | DIASTOLIC BLOOD PRESSURE: 91 MMHG | HEART RATE: 96 BPM | RESPIRATION RATE: 16 BRPM | OXYGEN SATURATION: 96 % | SYSTOLIC BLOOD PRESSURE: 124 MMHG | WEIGHT: 165.7 LBS

## 2021-04-12 DIAGNOSIS — R29.898 RIGHT LEG WEAKNESS: ICD-10-CM

## 2021-04-12 DIAGNOSIS — D57.00 SICKLE CELL PAIN CRISIS (H): Primary | ICD-10-CM

## 2021-04-12 DIAGNOSIS — D57.1 HB-SS DISEASE WITHOUT CRISIS (H): Primary | ICD-10-CM

## 2021-04-12 DIAGNOSIS — D57.00 SICKLE CELL PAIN CRISIS (H): ICD-10-CM

## 2021-04-12 LAB
ALBUMIN SERPL-MCNC: 3.9 G/DL (ref 3.4–5)
ALP SERPL-CCNC: 92 U/L (ref 40–150)
ALT SERPL W P-5'-P-CCNC: 128 U/L (ref 0–50)
ANION GAP SERPL CALCULATED.3IONS-SCNC: 6 MMOL/L (ref 3–14)
ANISOCYTOSIS BLD QL SMEAR: ABNORMAL
AST SERPL W P-5'-P-CCNC: 110 U/L (ref 0–45)
BASOPHILS # BLD AUTO: 0.2 10E9/L (ref 0–0.2)
BASOPHILS NFR BLD AUTO: 2 %
BILIRUB SERPL-MCNC: 3.4 MG/DL (ref 0.2–1.3)
BUN SERPL-MCNC: 8 MG/DL (ref 7–30)
CALCIUM SERPL-MCNC: 8.6 MG/DL (ref 8.5–10.1)
CHLORIDE SERPL-SCNC: 110 MMOL/L (ref 94–109)
CO2 SERPL-SCNC: 22 MMOL/L (ref 20–32)
CREAT SERPL-MCNC: 0.59 MG/DL (ref 0.52–1.04)
DIFFERENTIAL METHOD BLD: ABNORMAL
ELLIPTOCYTES BLD QL SMEAR: SLIGHT
EOSINOPHIL # BLD AUTO: 1 10E9/L (ref 0–0.7)
EOSINOPHIL NFR BLD AUTO: 12 %
ERYTHROCYTE [DISTWIDTH] IN BLOOD BY AUTOMATED COUNT: 23.2 % (ref 10–15)
GFR SERPL CREATININE-BSD FRML MDRD: >90 ML/MIN/{1.73_M2}
GLUCOSE SERPL-MCNC: 85 MG/DL (ref 70–99)
HCT VFR BLD AUTO: 24.7 % (ref 35–47)
HGB BLD-MCNC: 8.8 G/DL (ref 11.7–15.7)
LYMPHOCYTES # BLD AUTO: 2.6 10E9/L (ref 0.8–5.3)
LYMPHOCYTES NFR BLD AUTO: 30 %
MCH RBC QN AUTO: 33.8 PG (ref 26.5–33)
MCHC RBC AUTO-ENTMCNC: 35.6 G/DL (ref 31.5–36.5)
MCV RBC AUTO: 95 FL (ref 78–100)
MONOCYTES # BLD AUTO: 0.5 10E9/L (ref 0–1.3)
MONOCYTES NFR BLD AUTO: 6 %
NEUTROPHILS # BLD AUTO: 4.3 10E9/L (ref 1.6–8.3)
NEUTROPHILS NFR BLD AUTO: 50 %
NRBC # BLD AUTO: 0.3 10*3/UL
NRBC BLD AUTO-RTO: 3 /100
OVALOCYTES BLD QL SMEAR: ABNORMAL
PLATELET # BLD AUTO: 364 10E9/L (ref 150–450)
PLATELET # BLD EST: ABNORMAL 10*3/UL
POIKILOCYTOSIS BLD QL SMEAR: ABNORMAL
POLYCHROMASIA BLD QL SMEAR: ABNORMAL
POTASSIUM SERPL-SCNC: 4 MMOL/L (ref 3.4–5.3)
PROT SERPL-MCNC: 8.3 G/DL (ref 6.8–8.8)
RBC # BLD AUTO: 2.6 10E12/L (ref 3.8–5.2)
RETICS # AUTO: 513.2 10E9/L (ref 25–95)
RETICS/RBC NFR AUTO: 19.2 % (ref 0.5–2)
SICKLE CELLS BLD QL SMEAR: SLIGHT
SODIUM SERPL-SCNC: 138 MMOL/L (ref 133–144)
TARGETS BLD QL SMEAR: ABNORMAL
WBC # BLD AUTO: 8.6 10E9/L (ref 4–11)

## 2021-04-12 PROCEDURE — 96374 THER/PROPH/DIAG INJ IV PUSH: CPT

## 2021-04-12 PROCEDURE — 250N000011 HC RX IP 250 OP 636: Performed by: PEDIATRICS

## 2021-04-12 PROCEDURE — 80053 COMPREHEN METABOLIC PANEL: CPT | Performed by: PHYSICIAN ASSISTANT

## 2021-04-12 PROCEDURE — 96376 TX/PRO/DX INJ SAME DRUG ADON: CPT

## 2021-04-12 PROCEDURE — 258N000003 HC RX IP 258 OP 636: Performed by: PEDIATRICS

## 2021-04-12 PROCEDURE — 99215 OFFICE O/P EST HI 40 MIN: CPT | Performed by: PHYSICIAN ASSISTANT

## 2021-04-12 PROCEDURE — 96361 HYDRATE IV INFUSION ADD-ON: CPT

## 2021-04-12 PROCEDURE — 250N000011 HC RX IP 250 OP 636: Performed by: PHYSICIAN ASSISTANT

## 2021-04-12 PROCEDURE — 85045 AUTOMATED RETICULOCYTE COUNT: CPT | Performed by: PHYSICIAN ASSISTANT

## 2021-04-12 PROCEDURE — 85025 COMPLETE CBC W/AUTO DIFF WBC: CPT | Performed by: PHYSICIAN ASSISTANT

## 2021-04-12 RX ORDER — HEPARIN SODIUM (PORCINE) LOCK FLUSH IV SOLN 100 UNIT/ML 100 UNIT/ML
5 SOLUTION INTRAVENOUS
Status: DISCONTINUED | OUTPATIENT
Start: 2021-04-12 | End: 2021-04-12 | Stop reason: HOSPADM

## 2021-04-12 RX ORDER — MORPHINE SULFATE 2 MG/ML
2 INJECTION, SOLUTION INTRAMUSCULAR; INTRAVENOUS
Status: CANCELLED
Start: 2021-04-12

## 2021-04-12 RX ORDER — ONDANSETRON 8 MG/1
8 TABLET, FILM COATED ORAL
Status: CANCELLED
Start: 2021-04-12

## 2021-04-12 RX ORDER — HEPARIN SODIUM (PORCINE) LOCK FLUSH IV SOLN 100 UNIT/ML 100 UNIT/ML
5 SOLUTION INTRAVENOUS
Status: CANCELLED | OUTPATIENT
Start: 2021-04-12

## 2021-04-12 RX ORDER — OXYCODONE HYDROCHLORIDE 15 MG/1
15 TABLET ORAL EVERY 6 HOURS PRN
Qty: 60 TABLET | Refills: 0 | Status: ON HOLD | OUTPATIENT
Start: 2021-04-12 | End: 2021-05-11

## 2021-04-12 RX ORDER — MORPHINE SULFATE 2 MG/ML
2 INJECTION, SOLUTION INTRAMUSCULAR; INTRAVENOUS
Status: DISCONTINUED | OUTPATIENT
Start: 2021-04-12 | End: 2021-04-12 | Stop reason: HOSPADM

## 2021-04-12 RX ORDER — HEPARIN SODIUM,PORCINE 10 UNIT/ML
5 VIAL (ML) INTRAVENOUS
Status: CANCELLED | OUTPATIENT
Start: 2021-04-12

## 2021-04-12 RX ORDER — DIPHENHYDRAMINE HCL 25 MG
25 CAPSULE ORAL
Status: CANCELLED
Start: 2021-04-12

## 2021-04-12 RX ORDER — HEPARIN SODIUM (PORCINE) LOCK FLUSH IV SOLN 100 UNIT/ML 100 UNIT/ML
5 SOLUTION INTRAVENOUS ONCE
Status: COMPLETED | OUTPATIENT
Start: 2021-04-12 | End: 2021-04-12

## 2021-04-12 RX ADMIN — Medication 5 ML: at 16:27

## 2021-04-12 RX ADMIN — MORPHINE SULFATE 2 MG: 2 INJECTION, SOLUTION INTRAMUSCULAR; INTRAVENOUS at 14:02

## 2021-04-12 RX ADMIN — MORPHINE SULFATE 2 MG: 2 INJECTION, SOLUTION INTRAMUSCULAR; INTRAVENOUS at 16:04

## 2021-04-12 RX ADMIN — MORPHINE SULFATE 2 MG: 2 INJECTION, SOLUTION INTRAMUSCULAR; INTRAVENOUS at 15:08

## 2021-04-12 RX ADMIN — Medication 5 ML: at 13:22

## 2021-04-12 RX ADMIN — DEXTROSE AND SODIUM CHLORIDE 500 ML: 5; 450 INJECTION, SOLUTION INTRAVENOUS at 14:00

## 2021-04-12 ASSESSMENT — PAIN SCALES - GENERAL
PAINLEVEL: EXTREME PAIN (9)
PAINLEVEL: SEVERE PAIN (6)

## 2021-04-12 NOTE — PROGRESS NOTES
Infusion Nursing Note:  Jennifer Cervantes presents today for IVF/ IV pain medication.    Patient seen by provider today: Yes. Andrei HIGGINS   present during visit today: Not Applicable.    Note: Patient arrived reporting pain of 9/10 in her lower back.       Intravenous Access:  Implanted Port.    Treatment Conditions:  Lab Results   Component Value Date    HGB 8.8 04/12/2021     Lab Results   Component Value Date    WBC 8.6 04/12/2021      Lab Results   Component Value Date    ANEU 12.0 04/07/2021     Lab Results   Component Value Date     04/12/2021      Lab Results   Component Value Date     04/12/2021                   Lab Results   Component Value Date    POTASSIUM 4.0 04/12/2021           Lab Results   Component Value Date    MAG 1.7 02/21/2021            Lab Results   Component Value Date    CR 0.59 04/12/2021                   Lab Results   Component Value Date    MICAH 8.6 04/12/2021                Lab Results   Component Value Date    BILITOTAL 3.4 04/12/2021           Lab Results   Component Value Date    ALBUMIN 3.9 04/12/2021                    Lab Results   Component Value Date     04/12/2021           Lab Results   Component Value Date     04/12/2021           Post Infusion Assessment:  Patient tolerated infusion without incident.  Blood return noted pre and post infusion.  Site patent and intact, free from redness, edema or discomfort.  No evidence of extravasations.  Access discontinued per protocol.       Discharge Plan:   Patient declined prescription refills.  Discharge instructions reviewed with: Patient.  Patient and/or family verbalized understanding of discharge instructions and all questions answered.  AVS to patient via [employer].  Patient will return 4/16/21 to see Dr. Duncan for next appointment.   Patient discharged in stable condition accompanied by: self.  Departure Mode: Ambulatory.    Arely Brown RN

## 2021-04-12 NOTE — NURSING NOTE
Chief Complaint   Patient presents with     Port Draw     Labs drawn via port by RN in lab. VS taken.      Port accessed with 20 gauge 3/4 inch gripper needle and labs drawn by rn.  Port flushed with NS and heparin.  Pt tolerated well.  VS taken.  Pt checked in for next appt.    Melissa Wakefield RN

## 2021-04-12 NOTE — TELEPHONE ENCOUNTER
[facility] infusion offered at 1 pm, labs prior at 12:30 pm. Andrei added on for 1pm to see pt in infusion. Paged SW if they can provide cab voucher to get pt here by 1230, scheduling messaged. Called pt back which went directly to voicemail, did inform her on vm that she needs to call back to confirm she received the message and will be ready for the cab for .

## 2021-04-12 NOTE — PROGRESS NOTES
Social Work Follow-Up  [facility]    Data/Intervention:  Patient Name:  Jennifer Cervantes  /Age:  2000 (21 year old)    Reason for Follow-Up:  Transportation    Collaborated With:    -Elroy Patel RN  -Pt  -[employer]    Intervention/Education/Resources Provided:  SW received page that pt is needing ride assistance for 12:30pm appointment today, Elroy Patel RN also stated that pt is currently at a hotel right now. SW called pt and asked what the address is to the hotel they are currently at. Pt is currently staying at the [facility] in [place]. Pt was wondering if her cousin would be able to come with her to the appointment and SW stated that they aren't 100% what the policy for visitors is right now, but they will check with triage and get back to pt. Triage stated that for normal clinic appointments pt can have a visitor, but since pt is having an infusions visitors aren't allowed. SW set up ride through [employer] as it is too close to to appointment time to schedule through insurance. SW called pt back and informed them of what Triage stated about visitation and provided ride information. SW asked pt if there is anything else they can help assist with and pt denied at this time.    Assessment/Plan:  SW will remain available as needed.  Previously provided patient/family with writer's contact information and availability.       CARLOS Chavez,[facility]  Hematology/Oncology Social Worker  Phone:[phone] Pager: [phone]

## 2021-04-12 NOTE — TELEPHONE ENCOUNTER
Per Andrei: ok for ivf pain meds and labs prior, Andrei will see pt at 1 pm in clinic or in infusion depending on what time she can get an apt. Pt on infusion wait list.

## 2021-04-12 NOTE — TELEPHONE ENCOUNTER
Pt called in to triage requesting IVF pain meds for all over body, mostly back pain rated 8.5/10 x4-5 days. Stated last took prn Oxy IR 15 mg, tylenol, and scheduled celebrex and Oxycontin 10 mg this morning without relief. Pt state she has been given permission to increase Oxy IR to every 4 hours if needed for pain flares but has not had to do this, also she did not want to run out early. Her next refill is due tomorrow 4/13 and she has #5 tablets left, would like this filled at [facility] pharmacy.    Denied any fevers, chills, cough, sob, chest pain or other symptoms. Pt's last infusion was 4/6, last clinic visit 4/2 with follow-up on 4/16 with Dr. Duncan.     Pt was scheduled to see Andrei HIGGINS with plans for admission 4/9 but stated her phone battery ran out and she did not get calls from the clinic. She admitted she had to leave her house last week due to personal issues and is currently at a hotel, trying to find longer term housing in the meantime but does have her meds and following her pain plan at the hotel. Due to this she does not want to get admitted and lose her hotel room. Asking if she can still get infusions if she is able to see/have visit with ANDREAS prior. Will need ride assistance through either insurance or SW as she is in [place] but too far to walk to clinic. Paged Andrei.

## 2021-04-12 NOTE — PROGRESS NOTES
This is a recent snapshot of the patient's [facility] medical record.  For current drug dose and complete information and questions, call [phone] or In Basket pool, fv home infusion ([id number])  CSN Number:  [id number]

## 2021-04-12 NOTE — Clinical Note
4/12/2021         RE: Jennifer Cevrantes  [address]  [facility] [address]        Dear Colleague,    Thank you for referring your patient, Jennifer Cervantes, to the [facility]. Please see a copy of my visit note below.    Oncology/Hematology Visit Note  Apr 12, 2021    Reason for Visit: Follow up of sickle cell hgbSS disease     History of Present Illness: HgbSS complicated by frequent pain crises (acute and chronic components), history of stroke leading to significant cognitive delays and right upper extremity hemiparesis, iron overload 2/2 chronic transfusions as secondary ppx post-CVA, anxiety/depression, asthma, She is currently on Hydrea and Jadenu with plan to add Desferal due to significant iron overload.      She was admitted 2/1/21-2/3/21 with a new PE, started on Rivaroxaban. Switched to Eliquis 3/25/21 with RUE DVT.     She has been having more pain recently which is why I am seeing her today.     Interval History:  *ortho   *PT  *port   *social work    Current Outpatient Medications   Medication Sig Dispense Refill     acetaminophen (TYLENOL) 325 MG tablet Take 2 tablets (650 mg) by mouth every 6 hours as needed for mild pain 120 tablet 3     albuterol (PROAIR HFA/PROVENTIL HFA/VENTOLIN HFA) 108 (90 Base) MCG/ACT inhaler Inhale 2 puffs into the lungs every 6 hours as needed for shortness of breath / dyspnea or wheezing 8.5 g 3     albuterol (PROVENTIL) (2.5 MG/3ML) 0.083% neb solution Take 1 vial (2.5 mg) by nebulization every 6 hours as needed for shortness of breath / dyspnea or wheezing 12 mL 4     Apixaban Starter Pack (ELIQUIS DVT/PE STARTER PACK) 5 MG TBPK Take 10 mg by mouth 2 times daily for 7 days, THEN 5 mg 2 times daily for 23 days. 74 each 0     ARIPiprazole (ABILIFY) 2 MG tablet Take 1 tablet (2 mg) by mouth daily 30 tablet 3     aspirin (ASPIRIN) 81 MG EC tablet Take 1 tablet (81 mg) by mouth daily . HOLD while on Xarelto 90 tablet 3     budesonide-formoterol  (SYMBICORT) 160-4.5 MCG/ACT Inhaler Inhale 2 puffs into the lungs 2 times daily 10.2 g 3     celecoxib (CELEBREX) 100 MG capsule Take 1 capsule (100 mg) by mouth 2 times daily 60 capsule 3     diclofenac (VOLTAREN) 1 % topical gel Apply 4 g topically 4 times daily as needed for moderate pain Apply to back, legs, and arms for pain 150 g 3     diphenhydrAMINE (BENADRYL) 25 MG capsule Take 1-2 capsules (25-50 mg) by mouth nightly as needed for sleep 60 capsule 3     EPINEPHrine (ANY BX GENERIC EQUIV) 0.3 MG/0.3ML injection 2-pack Inject 0.3 mLs (0.3 mg) into the muscle as needed for anaphylaxis (Patient not taking: Reported on 4/2/2021) 1 each 1     Hydroxyurea 1000 MG TABS Take 2,000 mg by mouth daily 60 tablet 3     JADENU 360 MG tablet Take 4 tablets (1,440 mg) by mouth every evening 30 tablet 0     medroxyPROGESTERone (DEPO-PROVERA) 150 MG/ML IM injection Inject 150 mg into the muscle       naloxone (NARCAN) 4 MG/0.1ML nasal spray Spray 1 spray (4 mg) into one nostril alternating nostrils once as needed for opioid reversal every 2-3 minutes until assistance arrives (Patient not taking: Reported on 4/2/2021) 0.2 mL 1     ondansetron (ZOFRAN) 8 MG tablet        oxyCODONE (OXYCONTIN) 10 MG 12 hr tablet Take 1 tablet (10 mg) by mouth every 12 hours 60 tablet 0     oxyCODONE IR (ROXICODONE) 15 MG tablet Take 1 tablet (15 mg) by mouth every 6 hours as needed for severe pain 60 tablet 0     sertraline (ZOLOFT) 100 MG tablet Take 2 tablets (200 mg) by mouth daily 180 tablet 3       Physical Examination:  BP (!) 124/91 (BP Location: Right arm, Patient Position: Sitting, Cuff Size: Adult Regular)   Pulse 96   Temp 98.4  F (36.9  C) (Oral)   Resp 16   Wt 75.2 kg (165 lb 11.2 oz)   SpO2 96%   BMI 28.44 kg/m    Wt Readings from Last 10 Encounters:   04/12/21 75.2 kg (165 lb 11.2 oz)   04/07/21 73.9 kg (163 lb)   04/06/21 74.8 kg (164 lb 12.8 oz)   04/03/21 75.3 kg (166 lb)   04/02/21 75.4 kg (166 lb 4.8 oz)   04/01/21  74.4 kg (164 lb 1.6 oz)   03/29/21 74.8 kg (165 lb)   03/28/21 74.8 kg (165 lb)   03/27/21 74.8 kg (165 lb)   03/26/21 73.9 kg (162 lb 14.4 oz)     Constitutional: Well-appearing female in no acute distress.  Eyes: EOMI, PERRL. No scleral icterus.  ENT: Oral mucosa is moist without lesions or thrush.   Lymphatic: Neck is supple without cervical or supraclavicular lymphadenopathy. No axillary lymphadenopathy.  Cardiovascular: Regular rate and rhythm. No murmurs, gallops, or rubs. No peripheral edema.  Respiratory: Clear to auscultation bilaterally. No wheezes or crackles.  Gastrointestinal: Bowel sounds present. Abdomen soft, non-tender. No palpable hepatosplenomegaly or masses.   Neurologic: Cranial nerves II through XII are grossly intact.  Skin: No rashes, petechiae, or bruising noted on exposed skin.    Laboratory Data:        Assessment and Plan:          Andrei Machado PA-C  [facility] [address] [address]  [phone]        Again, thank you for allowing me to participate in the care of your patient.        Sincerely,        RONALDO Allan
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
## 2021-04-13 ENCOUNTER — HOME INFUSION (PRE-WILLOW HOME INFUSION) (OUTPATIENT)
Dept: PHARMACY | Facility: CLINIC | Age: 21
End: 2021-04-13

## 2021-04-13 ENCOUNTER — PATIENT OUTREACH (OUTPATIENT)
Dept: ONCOLOGY | Facility: CLINIC | Age: 21
End: 2021-04-13

## 2021-04-13 ENCOUNTER — PATIENT OUTREACH (OUTPATIENT)
Dept: CARE COORDINATION | Facility: CLINIC | Age: 21
End: 2021-04-13

## 2021-04-13 DIAGNOSIS — D57.1 HB-SS DISEASE WITHOUT CRISIS (H): Primary | ICD-10-CM

## 2021-04-13 DIAGNOSIS — Z11.59 ENCOUNTER FOR SCREENING FOR OTHER VIRAL DISEASES: ICD-10-CM

## 2021-04-13 NOTE — PROGRESS NOTES
Social Work Intervention  [facility]    Data/Intervention:    Patient Name:  Jennifer Cervantes  /Age:  2000 (21 year old)    Visit Type: telephone  Referral Source: [facility] Staff   Reason for Referral:  Housing Resources    Collaborated With:    -pt    Patient Concerns/Issues:   SW received in-basket message asking to reach out to pt and provide them with housing resources. Pt recently left their mom's house and is currently staying at a hotel in [facility].    Intervention/Education/Resources Provided:  SW has connected with pt briefly multiple times when assisting with setting up rides. SW called pt and re-introduced self and checked in to see how they are doing. SW explained to pt the reason for reaching out and pt stated that they are doing okay and still searching for housing. Pt stated that they are working with a housing worker, but it has been slow. No indication on how long the process could take, SW encouraged pt to keep working with the housing worker and reach out to them as needed. Pt also stated that they have been assigned a new , but unsure of their name and contact information. SW encouraged pt to reach out to [facility] to see what additional resources are available to pt for housing and encouraged to call and inquire about their new . SW to send pt an e-mail with additional housing resources. SW will include contact information in the e-mail for pt to reach out regarding any additional questions or concerns.    Assessment/Plan:  SW to send pt an e-mail with additional housing resources.  SW to remain available as needed.  Provided patient/family with contact information and availability.    CARLOS Chavez,NATALIA  Hematology/Oncology Social Worker  Phone:[phone] Pager: [phone]

## 2021-04-13 NOTE — PROGRESS NOTES
Patient is a 21 year old female with history of sickle cell disease and frequent pain crises, status post stroke with resultant cognitive and right upper extremity deficit. She has an existing left chest internal jugular port placed approximately 3 years ago per report, though this port is not power-capable. Team requests port removal and power port placement.    Chest X-ray demonstrates existing left chest port hub with large footprint.     Ideally, new power-port would be placed using the existing left chest pocket. There is some concern that the smaller footprint of the power port would lead to significant mobility within the left chest port pocket. Port mobility would be mitigated with suturing the port hub to the chest wall, but new right-sided chest port placement may be more durable. Ultimately, will defer to  regarding laterality of new power port placement.    Robe Gonzalez PA-C  Interventional Radiology  [phone] pgr.

## 2021-04-13 NOTE — PROGRESS NOTES
Writer placed call to Jennifer in follow up to request for port removal and placement of power port. Per Jacobo in IR, no need to hold Eliquis for this procedure. She is scheduled for procedure date of 4/21, COVID swab order placed and message sent to [facility] to add for lab appointment on 4/19 in early am. Left detailed voicemail for Jennifer.
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
## 2021-04-14 ENCOUNTER — PATIENT OUTREACH (OUTPATIENT)
Dept: ONCOLOGY | Facility: CLINIC | Age: 21
End: 2021-04-14

## 2021-04-14 ENCOUNTER — INFUSION THERAPY VISIT (OUTPATIENT)
Dept: ONCOLOGY | Facility: CLINIC | Age: 21
End: 2021-04-14
Attending: PEDIATRICS
Payer: COMMERCIAL

## 2021-04-14 ENCOUNTER — PATIENT OUTREACH (OUTPATIENT)
Dept: CARE COORDINATION | Facility: CLINIC | Age: 21
End: 2021-04-14

## 2021-04-14 VITALS
TEMPERATURE: 98.1 F | DIASTOLIC BLOOD PRESSURE: 84 MMHG | OXYGEN SATURATION: 95 % | RESPIRATION RATE: 16 BRPM | SYSTOLIC BLOOD PRESSURE: 124 MMHG | HEART RATE: 110 BPM

## 2021-04-14 DIAGNOSIS — D57.00 SICKLE CELL PAIN CRISIS (H): Primary | ICD-10-CM

## 2021-04-14 PROCEDURE — 250N000011 HC RX IP 250 OP 636: Performed by: PEDIATRICS

## 2021-04-14 PROCEDURE — 96374 THER/PROPH/DIAG INJ IV PUSH: CPT

## 2021-04-14 PROCEDURE — 258N000003 HC RX IP 258 OP 636: Performed by: PEDIATRICS

## 2021-04-14 PROCEDURE — 96361 HYDRATE IV INFUSION ADD-ON: CPT

## 2021-04-14 PROCEDURE — 96376 TX/PRO/DX INJ SAME DRUG ADON: CPT

## 2021-04-14 RX ORDER — MORPHINE SULFATE 2 MG/ML
2 INJECTION, SOLUTION INTRAMUSCULAR; INTRAVENOUS
Status: DISCONTINUED | OUTPATIENT
Start: 2021-04-14 | End: 2021-04-14 | Stop reason: HOSPADM

## 2021-04-14 RX ORDER — HEPARIN SODIUM (PORCINE) LOCK FLUSH IV SOLN 100 UNIT/ML 100 UNIT/ML
5 SOLUTION INTRAVENOUS
Status: DISCONTINUED | OUTPATIENT
Start: 2021-04-14 | End: 2021-04-14 | Stop reason: HOSPADM

## 2021-04-14 RX ORDER — HEPARIN SODIUM,PORCINE 10 UNIT/ML
5 VIAL (ML) INTRAVENOUS
Status: CANCELLED | OUTPATIENT
Start: 2021-04-14

## 2021-04-14 RX ORDER — DIPHENHYDRAMINE HCL 25 MG
25 CAPSULE ORAL
Status: CANCELLED
Start: 2021-04-14

## 2021-04-14 RX ORDER — HEPARIN SODIUM (PORCINE) LOCK FLUSH IV SOLN 100 UNIT/ML 100 UNIT/ML
5 SOLUTION INTRAVENOUS
Status: CANCELLED | OUTPATIENT
Start: 2021-04-14

## 2021-04-14 RX ORDER — MORPHINE SULFATE 2 MG/ML
2 INJECTION, SOLUTION INTRAMUSCULAR; INTRAVENOUS
Status: CANCELLED
Start: 2021-04-14

## 2021-04-14 RX ORDER — ONDANSETRON 8 MG/1
8 TABLET, FILM COATED ORAL
Status: CANCELLED
Start: 2021-04-14

## 2021-04-14 RX ADMIN — Medication 5 ML: at 15:46

## 2021-04-14 RX ADMIN — MORPHINE SULFATE 2 MG: 2 INJECTION, SOLUTION INTRAMUSCULAR; INTRAVENOUS at 15:23

## 2021-04-14 RX ADMIN — DEXTROSE AND SODIUM CHLORIDE 500 ML: 5; 450 INJECTION, SOLUTION INTRAVENOUS at 12:54

## 2021-04-14 RX ADMIN — MORPHINE SULFATE 2 MG: 2 INJECTION, SOLUTION INTRAMUSCULAR; INTRAVENOUS at 14:22

## 2021-04-14 RX ADMIN — MORPHINE SULFATE 2 MG: 2 INJECTION, SOLUTION INTRAMUSCULAR; INTRAVENOUS at 12:54

## 2021-04-14 NOTE — PROGRESS NOTES
Infusion Nursing Note:  Jennifer Cervantes presents today for IVF and pain medications.    Patient seen by provider today: No   present during visit today: Not Applicable.    Note: Pt arrives to infusion today complaining of 8/10 low back pain. Denies any s/s of infection or any other new concerns or complaints.     Upon discharge, pt rated her pain to be a 5/10 and was ready to be discharged home.     Intravenous Access:  Implanted Port.    Treatment Conditions:  Not Applicable.    Post Infusion Assessment:  Patient tolerated infusion without incident.  Blood return noted pre and post infusion.  Site patent and intact, free from redness, edema or discomfort.  No evidence of extravasations.  Access discontinued per protocol.     Discharge Plan:   Prescription refills given for Oxycodone.  AVS to patient via gopogo.  Patient will return 4/16 for next appointment with Dr. Duncan.   Patient discharged in stable condition accompanied by: self.  Departure Mode: Ambulatory.  Face to Face time: 0.    Zoraida Orozco RN

## 2021-04-14 NOTE — PROGRESS NOTES
[facility] Triage Note    Patient called triage requesting IVF pain meds for lower back pain rated 8/10 x since early this am.    Stated last took prn Oxycontin and Oxycodone pain medication at 7 am this morning without relief.     Denied fevers, chills, cough, sob, chest pain or other symptoms.     Pt's last infusion 4/12/21   Last clinic visit 4/12/21 with JOSIANE Machado ANDREAS  Follow-up clinic visit 4/16/21 with Dr. Duncan.     Pt will  refills today.     Pt does note qualifiy for sickle cell standing order protocol at this time.    Estimated time for travel to appt 15 minutes    Ride assistance not needed    Per JOSIANE Machado, ANDREAS ok to come for IV infusion today.  Message to infusion to check for time.  No labs needed.    Patient notified and she will come today.     Kelsey Santiago RN   BSN, HNBC, STAR-T  [facility] Triage

## 2021-04-14 NOTE — PROGRESS NOTES
This is a recent snapshot of the patient's [facility] medical record.  For current drug dose and complete information and questions, call [phone] or In Basket pool, fv home infusion ([id number])  CSN Number:  [id number]

## 2021-04-14 NOTE — PROGRESS NOTES
Spoke to Jennifer for IVF/pain infusion today at 12:30pm.     UDAY Fernando aware and will arrange transportation    Scheduling notified.

## 2021-04-14 NOTE — PROGRESS NOTES
Social Work Follow-Up  [facility]    Data/Intervention:  Patient Name:  Jennifer Cervantes  /Age:  2000 (21 year old)    Reason for Follow-Up:  Transportation    Collaborated With:    -Elroy Rodriguez RN  -[facility]  -Pt    Intervention/Education/Resources Provided:  SW received call from Elroy Rodriguez RN who stated that pt is coming in for infusion starting at 12:30pm. SW called pt's insurance to set up ride and due to 5 or more no shows pt needs to call transportation plus and let them know when she is ready to go. Pt will have a will call return ride after the appointment. SW called pt and relayed this information and pt verbalized understanding.     Assessment/Plan:  SW will remain available as needed.  Previously provided patient/family with writer's contact information and availability.       CARLOS Chavez,LGSW  Hematology/Oncology Social Worker  Phone:[phone] Pager: [phone]

## 2021-04-15 ENCOUNTER — INFUSION THERAPY VISIT (OUTPATIENT)
Dept: ONCOLOGY | Facility: CLINIC | Age: 21
End: 2021-04-15
Attending: PHYSICIAN ASSISTANT
Payer: COMMERCIAL

## 2021-04-15 ENCOUNTER — PATIENT OUTREACH (OUTPATIENT)
Dept: ONCOLOGY | Facility: CLINIC | Age: 21
End: 2021-04-15

## 2021-04-15 ENCOUNTER — PATIENT OUTREACH (OUTPATIENT)
Dept: CARE COORDINATION | Facility: CLINIC | Age: 21
End: 2021-04-15

## 2021-04-15 VITALS
DIASTOLIC BLOOD PRESSURE: 78 MMHG | OXYGEN SATURATION: 95 % | SYSTOLIC BLOOD PRESSURE: 126 MMHG | HEART RATE: 109 BPM | TEMPERATURE: 98.3 F | RESPIRATION RATE: 18 BRPM

## 2021-04-15 DIAGNOSIS — D57.00 SICKLE CELL PAIN CRISIS (H): Primary | ICD-10-CM

## 2021-04-15 PROCEDURE — 250N000011 HC RX IP 250 OP 636: Performed by: PEDIATRICS

## 2021-04-15 PROCEDURE — 96361 HYDRATE IV INFUSION ADD-ON: CPT

## 2021-04-15 PROCEDURE — 96374 THER/PROPH/DIAG INJ IV PUSH: CPT

## 2021-04-15 PROCEDURE — 96360 HYDRATION IV INFUSION INIT: CPT

## 2021-04-15 PROCEDURE — 258N000003 HC RX IP 258 OP 636: Performed by: PEDIATRICS

## 2021-04-15 RX ORDER — HEPARIN SODIUM,PORCINE 10 UNIT/ML
5 VIAL (ML) INTRAVENOUS
Status: DISCONTINUED | OUTPATIENT
Start: 2021-04-15 | End: 2021-04-15 | Stop reason: HOSPADM

## 2021-04-15 RX ORDER — HEPARIN SODIUM (PORCINE) LOCK FLUSH IV SOLN 100 UNIT/ML 100 UNIT/ML
5 SOLUTION INTRAVENOUS
Status: CANCELLED | OUTPATIENT
Start: 2021-04-15

## 2021-04-15 RX ORDER — MORPHINE SULFATE 2 MG/ML
2 INJECTION, SOLUTION INTRAMUSCULAR; INTRAVENOUS
Status: CANCELLED
Start: 2021-04-15

## 2021-04-15 RX ORDER — MORPHINE SULFATE 2 MG/ML
2 INJECTION, SOLUTION INTRAMUSCULAR; INTRAVENOUS
Status: DISCONTINUED | OUTPATIENT
Start: 2021-04-15 | End: 2021-04-15 | Stop reason: HOSPADM

## 2021-04-15 RX ORDER — ONDANSETRON 8 MG/1
8 TABLET, FILM COATED ORAL
Status: CANCELLED
Start: 2021-04-15

## 2021-04-15 RX ORDER — HEPARIN SODIUM,PORCINE 10 UNIT/ML
5 VIAL (ML) INTRAVENOUS
Status: CANCELLED | OUTPATIENT
Start: 2021-04-15

## 2021-04-15 RX ORDER — HEPARIN SODIUM (PORCINE) LOCK FLUSH IV SOLN 100 UNIT/ML 100 UNIT/ML
5 SOLUTION INTRAVENOUS
Status: DISCONTINUED | OUTPATIENT
Start: 2021-04-15 | End: 2021-04-15 | Stop reason: HOSPADM

## 2021-04-15 RX ORDER — DIPHENHYDRAMINE HCL 25 MG
25 CAPSULE ORAL
Status: CANCELLED
Start: 2021-04-15

## 2021-04-15 RX ADMIN — MORPHINE SULFATE 2 MG: 2 INJECTION, SOLUTION INTRAMUSCULAR; INTRAVENOUS at 15:48

## 2021-04-15 RX ADMIN — DEXTROSE AND SODIUM CHLORIDE 500 ML: 5; 450 INJECTION, SOLUTION INTRAVENOUS at 13:30

## 2021-04-15 RX ADMIN — MORPHINE SULFATE 2 MG: 2 INJECTION, SOLUTION INTRAMUSCULAR; INTRAVENOUS at 13:30

## 2021-04-15 RX ADMIN — Medication 5 ML: at 16:04

## 2021-04-15 RX ADMIN — MORPHINE SULFATE 2 MG: 2 INJECTION, SOLUTION INTRAMUSCULAR; INTRAVENOUS at 14:46

## 2021-04-15 ASSESSMENT — PAIN SCALES - GENERAL: PAINLEVEL: EXTREME PAIN (9)

## 2021-04-15 NOTE — PROGRESS NOTES
Social Work Follow-Up  [facility]    Data/Intervention:  Patient Name:  Jennifer Cervantes  /Age:  2000 (21 year old)    Reason for Follow-Up:  Transportation    Collaborated With:    -pt  -Taxi Voucher    Intervention/Education/Resources Provided:  SW received page that pt needs ride assistance for a 1 pm appointment today. Due to timing SW used a taxi voucher instead of calling pt's insurance company. Pt will ride with [employer] and has a will call return ride. SW called pt and asked them how they were doing and pt stated they were good. SW provided pt with ride information and pt thanked SW for assistance.    Assessment/Plan:  SW will remain available as needed.  Previously provided patient/family with writer's contact information and availability.       CARLOS Chavez,LGSW  Hematology/Oncology Social Worker  Phone:[phone] Pager: [phone]
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
## 2021-04-15 NOTE — PROGRESS NOTES
[facility] Triage Note    Patient called triage requesting IVF pain meds for generalized pain rated 9/10 x started last night.     Stated last took prn Oxycontin pain medication at 7 am this morning without relief.   Oxycodone 7 am  Tylenol 7 am  Denied fevers, chills, cough, sob, chest pain or other symptoms.     Pt's last infusion 4/16/21   Last clinic visit 4/12/21 with JOSIANE Machado, ANDREAS  Follow-up clinic visit 4/16/21 with Dr. Duncan.     Pt denied being out of home medications and needing refills today.     Pt qualifies for sickle cell standing order protocol.    [facility]    Ride assistance would be needed    Paged Dr. Duncan at 11:02 am; per Epic message OK for IV pain medication in infusion today if opening.    Called patient and confirmed address for  at [address] [place] [id number]    She will await ride that has been set up by social work.    Kelsey Santiago RN   BSN, HNBC, STAR-T  [facility] Triage

## 2021-04-16 ENCOUNTER — TELEPHONE (OUTPATIENT)
Dept: ONCOLOGY | Facility: CLINIC | Age: 21
End: 2021-04-16

## 2021-04-16 ENCOUNTER — HOME INFUSION (PRE-WILLOW HOME INFUSION) (OUTPATIENT)
Dept: PHARMACY | Facility: CLINIC | Age: 21
End: 2021-04-16

## 2021-04-16 ENCOUNTER — ONCOLOGY VISIT (OUTPATIENT)
Dept: ONCOLOGY | Facility: CLINIC | Age: 21
End: 2021-04-16
Attending: PEDIATRICS
Payer: COMMERCIAL

## 2021-04-16 ENCOUNTER — DOCUMENTATION ONLY (OUTPATIENT)
Dept: OTHER | Facility: CLINIC | Age: 21
End: 2021-04-16

## 2021-04-16 ENCOUNTER — HOSPITAL ENCOUNTER (EMERGENCY)
Facility: CLINIC | Age: 21
Discharge: HOME | End: 2021-04-16
Attending: EMERGENCY MEDICINE | Admitting: EMERGENCY MEDICINE
Payer: COMMERCIAL

## 2021-04-16 ENCOUNTER — APPOINTMENT (OUTPATIENT)
Dept: GENERAL RADIOLOGY | Facility: CLINIC | Age: 21
End: 2021-04-16
Attending: EMERGENCY MEDICINE
Payer: COMMERCIAL

## 2021-04-16 VITALS
RESPIRATION RATE: 16 BRPM | SYSTOLIC BLOOD PRESSURE: 122 MMHG | OXYGEN SATURATION: 98 % | HEART RATE: 105 BPM | DIASTOLIC BLOOD PRESSURE: 76 MMHG | WEIGHT: 168.21 LBS | BODY MASS INDEX: 28.87 KG/M2

## 2021-04-16 VITALS
OXYGEN SATURATION: 96 % | SYSTOLIC BLOOD PRESSURE: 152 MMHG | BODY MASS INDEX: 28.87 KG/M2 | HEART RATE: 119 BPM | WEIGHT: 168.2 LBS | DIASTOLIC BLOOD PRESSURE: 89 MMHG | TEMPERATURE: 98.3 F

## 2021-04-16 DIAGNOSIS — D57.00 SICKLE CELL PAIN CRISIS (H): ICD-10-CM

## 2021-04-16 DIAGNOSIS — I82.611 SUPERFICIAL VENOUS THROMBOSIS OF ARM, RIGHT: Primary | ICD-10-CM

## 2021-04-16 LAB
ALBUMIN SERPL-MCNC: 4.1 G/DL (ref 3.4–5)
ALP SERPL-CCNC: 80 U/L (ref 40–150)
ALT SERPL W P-5'-P-CCNC: 112 U/L (ref 0–50)
ANION GAP SERPL CALCULATED.3IONS-SCNC: 7 MMOL/L (ref 3–14)
AST SERPL W P-5'-P-CCNC: 102 U/L (ref 0–45)
BASOPHILS # BLD AUTO: 0.2 10E9/L (ref 0–0.2)
BASOPHILS NFR BLD AUTO: 1.7 %
BILIRUB SERPL-MCNC: 2.9 MG/DL (ref 0.2–1.3)
BUN SERPL-MCNC: 13 MG/DL (ref 7–30)
CALCIUM SERPL-MCNC: 9 MG/DL (ref 8.5–10.1)
CHLORIDE SERPL-SCNC: 106 MMOL/L (ref 94–109)
CO2 SERPL-SCNC: 22 MMOL/L (ref 20–32)
CREAT SERPL-MCNC: 0.6 MG/DL (ref 0.52–1.04)
DIFFERENTIAL METHOD BLD: ABNORMAL
EOSINOPHIL # BLD AUTO: 0.9 10E9/L (ref 0–0.7)
EOSINOPHIL NFR BLD AUTO: 8.8 %
ERYTHROCYTE [DISTWIDTH] IN BLOOD BY AUTOMATED COUNT: 22.1 % (ref 10–15)
GFR SERPL CREATININE-BSD FRML MDRD: >90 ML/MIN/{1.73_M2}
GLUCOSE SERPL-MCNC: 91 MG/DL (ref 70–99)
HCT VFR BLD AUTO: 23.2 % (ref 35–47)
HGB BLD-MCNC: 8.2 G/DL (ref 11.7–15.7)
IMM GRANULOCYTES # BLD: 0.1 10E9/L (ref 0–0.4)
IMM GRANULOCYTES NFR BLD: 0.5 %
LYMPHOCYTES # BLD AUTO: 2.7 10E9/L (ref 0.8–5.3)
LYMPHOCYTES NFR BLD AUTO: 26.1 %
MCH RBC QN AUTO: 32.5 PG (ref 26.5–33)
MCHC RBC AUTO-ENTMCNC: 35.3 G/DL (ref 31.5–36.5)
MCV RBC AUTO: 92 FL (ref 78–100)
MONOCYTES # BLD AUTO: 0.6 10E9/L (ref 0–1.3)
MONOCYTES NFR BLD AUTO: 6 %
NEUTROPHILS # BLD AUTO: 5.9 10E9/L (ref 1.6–8.3)
NEUTROPHILS NFR BLD AUTO: 56.9 %
NRBC # BLD AUTO: 0.3 10*3/UL
NRBC BLD AUTO-RTO: 2 /100
PLATELET # BLD AUTO: 344 10E9/L (ref 150–450)
POTASSIUM SERPL-SCNC: 4 MMOL/L (ref 3.4–5.3)
PROT SERPL-MCNC: 8.6 G/DL (ref 6.8–8.8)
RBC # BLD AUTO: 2.52 10E12/L (ref 3.8–5.2)
RETICS # AUTO: 289.8 10E9/L (ref 25–95)
RETICS/RBC NFR AUTO: 11.5 % (ref 0.5–2)
SODIUM SERPL-SCNC: 135 MMOL/L (ref 133–144)
WBC # BLD AUTO: 10.4 10E9/L (ref 4–11)

## 2021-04-16 PROCEDURE — 71045 X-RAY EXAM CHEST 1 VIEW: CPT

## 2021-04-16 PROCEDURE — 71045 X-RAY EXAM CHEST 1 VIEW: CPT | Mod: 26 | Performed by: RADIOLOGY

## 2021-04-16 PROCEDURE — G0463 HOSPITAL OUTPT CLINIC VISIT: HCPCS

## 2021-04-16 PROCEDURE — 99214 OFFICE O/P EST MOD 30 MIN: CPT | Performed by: PEDIATRICS

## 2021-04-16 PROCEDURE — 96376 TX/PRO/DX INJ SAME DRUG ADON: CPT

## 2021-04-16 PROCEDURE — 93005 ELECTROCARDIOGRAM TRACING: CPT | Mod: 76

## 2021-04-16 PROCEDURE — 250N000011 HC RX IP 250 OP 636: Performed by: EMERGENCY MEDICINE

## 2021-04-16 PROCEDURE — 99285 EMERGENCY DEPT VISIT HI MDM: CPT | Mod: 25

## 2021-04-16 PROCEDURE — 85025 COMPLETE CBC W/AUTO DIFF WBC: CPT | Performed by: FAMILY MEDICINE

## 2021-04-16 PROCEDURE — 85045 AUTOMATED RETICULOCYTE COUNT: CPT | Performed by: FAMILY MEDICINE

## 2021-04-16 PROCEDURE — 258N000003 HC RX IP 258 OP 636: Performed by: EMERGENCY MEDICINE

## 2021-04-16 PROCEDURE — 93005 ELECTROCARDIOGRAM TRACING: CPT

## 2021-04-16 PROCEDURE — 80053 COMPREHEN METABOLIC PANEL: CPT | Performed by: FAMILY MEDICINE

## 2021-04-16 PROCEDURE — 99285 EMERGENCY DEPT VISIT HI MDM: CPT | Performed by: EMERGENCY MEDICINE

## 2021-04-16 PROCEDURE — 96361 HYDRATE IV INFUSION ADD-ON: CPT

## 2021-04-16 PROCEDURE — 96374 THER/PROPH/DIAG INJ IV PUSH: CPT

## 2021-04-16 RX ORDER — MORPHINE SULFATE 4 MG/ML
2 INJECTION, SOLUTION INTRAMUSCULAR; INTRAVENOUS ONCE
Status: DISCONTINUED | OUTPATIENT
Start: 2021-04-16 | End: 2021-04-17 | Stop reason: HOSPADM

## 2021-04-16 RX ORDER — MORPHINE SULFATE 4 MG/ML
2 INJECTION, SOLUTION INTRAMUSCULAR; INTRAVENOUS ONCE
Status: COMPLETED | OUTPATIENT
Start: 2021-04-16 | End: 2021-04-16

## 2021-04-16 RX ORDER — HEPARIN SODIUM (PORCINE) LOCK FLUSH IV SOLN 100 UNIT/ML 100 UNIT/ML
5 SOLUTION INTRAVENOUS
Status: DISCONTINUED | OUTPATIENT
Start: 2021-04-16 | End: 2021-04-17 | Stop reason: HOSPADM

## 2021-04-16 RX ORDER — MORPHINE SULFATE 2 MG/ML
2 INJECTION, SOLUTION INTRAMUSCULAR; INTRAVENOUS
Status: COMPLETED | OUTPATIENT
Start: 2021-04-16 | End: 2021-04-16

## 2021-04-16 RX ADMIN — MORPHINE SULFATE 2 MG: 4 INJECTION, SOLUTION INTRAMUSCULAR; INTRAVENOUS at 20:21

## 2021-04-16 RX ADMIN — SODIUM CHLORIDE, POTASSIUM CHLORIDE, SODIUM LACTATE AND CALCIUM CHLORIDE 500 ML: 600; 310; 30; 20 INJECTION, SOLUTION INTRAVENOUS at 20:21

## 2021-04-16 RX ADMIN — MORPHINE SULFATE 2 MG: 2 INJECTION, SOLUTION INTRAMUSCULAR; INTRAVENOUS at 21:31

## 2021-04-16 RX ADMIN — Medication 5 ML: at 22:30

## 2021-04-16 RX ADMIN — MORPHINE SULFATE 2 MG: 2 INJECTION, SOLUTION INTRAMUSCULAR; INTRAVENOUS at 22:18

## 2021-04-16 ASSESSMENT — ENCOUNTER SYMPTOMS
COUGH: 0
CHEST TIGHTNESS: 1
FEVER: 0
VOMITING: 0
DYSURIA: 0
DIARRHEA: 0
MYALGIAS: 1

## 2021-04-16 ASSESSMENT — PAIN SCALES - GENERAL: PAINLEVEL: EXTREME PAIN (8)

## 2021-04-16 NOTE — NURSING NOTE
"Oncology Rooming Note    April 16, 2021 12:59 PM   Jennifer Cervantes is a 21 year old female who presents for:    Chief Complaint   Patient presents with     Oncology Clinic Visit     sickle cell     Initial Vitals: BP (!) 152/89   Pulse 119   Temp 98.3  F (36.8  C) (Oral)   Wt 76.3 kg (168 lb 3.2 oz)   SpO2 96%   BMI 28.87 kg/m   Estimated body mass index is 28.87 kg/m  as calculated from the following:    Height as of 4/3/21: 1.626 m (5' 4\").    Weight as of this encounter: 76.3 kg (168 lb 3.2 oz). Body surface area is 1.86 meters squared.  Extreme Pain (8) Comment: Data Unavailable   No LMP recorded. Patient has had an injection.  Allergies reviewed: Yes  Medications reviewed: Yes    Medications: Medication refills not needed today.  Pharmacy name entered into EPIC:    [facility] - [place], [place] - [address]  [facility] #[id number] - [facility] [address] [facility] - [place], [place] - [phone]  [id number] [facility] [address] [facility] #[id number] [facility] [address] [facility] [address] OF [address] [facility] - [place], [place] - [address]    Clinical concerns: would like an infusion today if possible, if not she plans on going to the ER this weekend       Eullaia Broderick CMA            "

## 2021-04-16 NOTE — TELEPHONE ENCOUNTER
"Pt called in to triage asking when she should come in to the ED, she just had a visit with Dr. Duncan and clinic was unable to offer her an infusion for ivf pain meds. Stated her pain was 8/10 when she left and she can feel pain \"ramping up again\". She wanted to know how many people are in the waiting room at the ED as she had come in before and waited 4-5 hours which makes her pain worse.    Informed her if pain is unmanageable at home recommend she come in now. ED currently shows 22 patients but unable to confirm if all roomed or not and no guarantee there won't be more checked in by the time she arrives. She verbalized understanding and will head into ED.  "

## 2021-04-16 NOTE — PROGRESS NOTES
Sickle Cell Outpatient Follow Up Visit      Date of encounter: Apr 16, 2021    Jennifer Cervantes is a 21 year old female who is being seen in clinic for hospital follow up and health maintenance related to SCD      Interval History: Jennifer's last 1-2 weeks have been a bit better than the previous once. She has not needed to come to the ED quite as often, though she has pain today (lower back) and wanted to see if she could get into infusion. She also stated that things are still tough at home and she no longer has a trust in her mom as PCA. She is living in an extended stay hotel with her cousin and has been for a week or so--she thinks being out of a toxic environment mentally at home has improved her pain management in the past week overall. She continues to deny any physical violence or abuse. She feels like she has a plan for her home situation figured out for now. She also wants to make sure she does not neglect her health. Last week she mentioned her port being difficult to manage for some nurses in the ED and we were able to coordinate for her to have new port placed Tuesday. Due to the new port being put in, the Desferal has not yet started though she is taking Jadenu and we will be able to do the infusions at a hotel. Her other request has to do with rehab for her wrist and right ankle. She said she has been frustrated by the lack of improvement with home exercises though she admits it has been a while since she followed up with PT/OT. She wants to know if she should see orthopedics or someone else. Symptomatically, she has not had any fever, headache, chest pain, dyspnea, or vision changes. No bleeding while on Apixaban.      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 She also has significant anxiety and depression with a strong anxiety about transferring to the adult clinic. She usually has pain crises secondary to the anxiety.      --------------------------------  Plan last reviewed with patient: 3/26/2021    Patient background: 22yo F, enjoys movies and kids though there are times where she does not really want to talk to people. Does not have a lot of social support at home.     Sickle Cell Disease History  Primary Hematologist: Perla  PCP: Raul  Genotype: SS  Acute Pain Crisis Treatment:    ER/Acute Care/Infusion Clinic:   o Morphine 2 mg IVP/SC Q1H X 3 doses  o Toradol x1 (avoid through early May due to being on anticoagulation)  o Maintenance IV fluids with LR  o Other: Benadryl PO and Zofran 8 mg IV PRN itching or nausea    Inpatient:  o Opioid: Morphine 2 mg IV Q1H PRN until PCA starts  o PCA plan:   - PCA button dose: Morphine 1 mg  - Lockout: 20 minutes  - Continuous Infusion: consider 1 mg/hr  - One hr max: 4 mg  o Other Medications: Benadryl, Zofran  o If PCA not working, she Has had success with ketamine starting at 4mg/h and advancing only to 6mg/h, as 8mg/h made her feel quite poorly.  o ASA on hold (ok to give celebrex)  o Supportive Care: Docusate, Senna  Chronic Pain Medications:    Home regimen Oxycontin 10 mg q12h, oxycodone 10 mg p.o. q.6 hours p.r.n. breakthrough pain.  She also continues on Celebrex, and Zoloft among other medications.    -Also benefits from mental health visits, acupuncture  Baseline Hemoglobin: 7 g/dl without chronic transfusions  Hydroxyurea use: Yes  H/O blood transfusions: Yes, several (iron overload) Most recent 8/21/20    H/O Transfusion Reactions: no    Antibodies:none  H/O Acute Chest Syndrome: Yes    Last episode: 10/2019     ICU/intubation: unsure  H/O Stroke: Yes (managed with chronic transfusions in the past)  H/O VTE: Yes  (2/2021)  H/O Cholecystectomy or Splenectomy: no  H/O Asthma, Pulm HTN, AVN, Leg Ulcers, Nephropathy, Retinopathy, etc: Iron overload, asthma, chronic lung disease, mild developmental delay from early stroke    -------------------------------------------    Sickle Cell Disease Comprehensive Checklist    Bone Health/Avascular Necrosis Screening/Symptoms (each visit): no new concerns today    Leg Ulcer evaluation (every visit): none    Hypertension (every visit): mildly hypertensive recently but improved later in evening and previous day     Last urinalysis for microalbuminuria/CKD (annually): within last year    Last pulmonary evaluation (asthma, AMAN, pulm HTN): within last year    Stroke/silent cerebral infarct Hx (Y/N): Yes TIA ~2014, first event ~age 1 with full stroke and R sided weakness    Last PCP Visit: 8/2020    Vaccines:  o PCV13: 5/13/19  o Pneumovax (PPSV23): 3/04, 10/09, 7/12/19 (next due 7/2024)  o Menactra: 4/2010, 9/2015 (MCV overdue Sept 2020)  o Influenza: got 20-21 vaccine per self report    Audiology (chelation): done 6/2020, normal    Past Medical History:  Past Medical History:   Diagnosis Date     Anemia      Anxiety      Bleeding disorder (H)      Blood clotting disorder (H)      Cerebral infarction (H) 2015     Cognitive developmental delay     low IQ. 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 (H)     right hand contractures     Iron overload due to repeated red blood cell transfusions      Migraines      Multiple subsegmental pulmonary emboli without acute cor pulmonale (H) 02/01/2021     Oppositional defiant behavior      Superficial venous thrombosis of arm, right 03/25/2021     Uncomplicated asthma        Past Surgical History:  Past Surgical History:   Procedure Laterality Date     AS INSERT TUNNELED CV 2 CATH W/O PORT/PUMP       C BREAST REDUCTION (INCLUDES  LIPO) TIER 3 Bilateral 04/23/2019     CHOLECYSTECTOMY       IR CVC NON TUNNEL PLACEMENT  04/07/2020     REPAIR TENDON ELBOW Right 10/02/2019    Procedure: Right Elbow Flexor Lengthening, Flexor Pronator Slide Of Wrist and Finger, Thumb Adductor Lengthening;  Surgeon: Anai Franco MD;  Location: [facility] OR     TONSILLECTOMY Bilateral 10/02/2019    Procedure: Bilateral Tonsillectomy;  Surgeon: Farhana Guy MD;  Location: [facility] OR       Family History:   Family History   Problem Relation Age of Onset     Sickle Cell Trait Mother      Hypertension Mother      Asthma Mother      Sickle Cell Trait Father        Social History:  Social History     Socioeconomic History     Marital status: Single     Spouse name: Not on file     Number of children: Not on file     Years of education: Not on file     Highest education level: Not on file   Occupational History     Not on file   Social Needs     Financial resource strain: Not on file     Food insecurity     Worry: Not on file     Inability: Not on file     Transportation needs     Medical: Not on file     Non-medical: Not on file   Tobacco Use     Smoking status: Never Smoker     Smokeless tobacco: Never Used   Substance and Sexual Activity     Alcohol use: Not Currently     Alcohol/week: 0.0 standard drinks     Drug use: Never     Sexual activity: Not Currently     Partners: Male     Birth control/protection: Other   Lifestyle     Physical activity     Days per week: Not on file     Minutes per session: Not on file     Stress: Not on file   Relationships     Social connections     Talks on phone: Not on file     Gets together: Not on file     Attends Sabianism service: Not on file     Active member of club or organization: Not on file     Attends meetings of clubs or organizations: Not on file     Relationship status: Not on file     Intimate partner violence     Fear of current or ex partner: Not on file     Emotionally abused: Not on file     Physically  "abused: Not on file     Forced sexual activity: Not on file   Other Topics Concern     Parent/sibling w/ CABG, MI or angioplasty before 65F 55M? Not Asked   Social History Narrative    Lives with mom and extended family but \"toxic environment\" per her report. She would like to move out but cannot financially do so. She has minimal support at home despite her significant SCD comorbidities and cognitive delay from stroke.       Medications:  Current Outpatient Medications   Medication     acetaminophen (TYLENOL) 325 MG tablet     albuterol (PROAIR HFA/PROVENTIL HFA/VENTOLIN HFA) 108 (90 Base) MCG/ACT inhaler     albuterol (PROVENTIL) (2.5 MG/3ML) 0.083% neb solution     Apixaban Starter Pack (ELIQUIS DVT/PE STARTER PACK) 5 MG TBPK     ARIPiprazole (ABILIFY) 2 MG tablet     aspirin (ASPIRIN) 81 MG EC tablet     budesonide-formoterol (SYMBICORT) 160-4.5 MCG/ACT Inhaler     celecoxib (CELEBREX) 100 MG capsule     diclofenac (VOLTAREN) 1 % topical gel     diphenhydrAMINE (BENADRYL) 25 MG capsule     EPINEPHrine (ANY BX GENERIC EQUIV) 0.3 MG/0.3ML injection 2-pack     Hydroxyurea 1000 MG TABS     JADENU 360 MG tablet     medroxyPROGESTERone (DEPO-PROVERA) 150 MG/ML IM injection     naloxone (NARCAN) 4 MG/0.1ML nasal spray     ondansetron (ZOFRAN) 8 MG tablet     oxyCODONE (OXYCONTIN) 10 MG 12 hr tablet     oxyCODONE IR (ROXICODONE) 15 MG tablet     sertraline (ZOLOFT) 100 MG tablet     No current facility-administered medications for this visit.          Physical Exam:   BP (!) 152/89   Pulse 119   Temp 98.3  F (36.8  C) (Oral)   Wt 76.3 kg (168 lb 3.2 oz)   SpO2 96%   BMI 28.87 kg/m       GEN: young  female, appears comfortable in clinic, seems to be in a good mood  HEENT: no icterus, MMM  CV: RRR, no murmurs  NECK: no visible asymmetry  RESP: speaking in full sentences, no increased work of breathing, clear to auscultation  SKIN: no bruising noted  MSK: contracture at right wrist (chronic), now with " slight edema compared to previous visits but non painful to touch  NEURO: alert and oriented      Labs:   Results for HIMANSHU AL (MRN [id number]) as of 4/19/2021 11:10   Ref. Range 4/16/2021 18:32   Sodium Latest Ref Range: 133 - 144 mmol/L 135   Potassium Latest Ref Range: 3.4 - 5.3 mmol/L 4.0   Chloride Latest Ref Range: 94 - 109 mmol/L 106   Carbon Dioxide Latest Ref Range: 20 - 32 mmol/L 22   Urea Nitrogen Latest Ref Range: 7 - 30 mg/dL 13   Creatinine Latest Ref Range: 0.52 - 1.04 mg/dL 0.60   GFR Estimate Latest Ref Range: >60 mL/min/1.73_m2 >90   GFR Estimate If Black Latest Ref Range: >60 mL/min/1.73_m2 >90   Calcium Latest Ref Range: 8.5 - 10.1 mg/dL 9.0   Anion Gap Latest Ref Range: 3 - 14 mmol/L 7   Albumin Latest Ref Range: 3.4 - 5.0 g/dL 4.1   Protein Total Latest Ref Range: 6.8 - 8.8 g/dL 8.6   Bilirubin Total Latest Ref Range: 0.2 - 1.3 mg/dL 2.9 (H)   Alkaline Phosphatase Latest Ref Range: 40 - 150 U/L 80   ALT Latest Ref Range: 0 - 50 U/L 112 (H)   AST Latest Ref Range: 0 - 45 U/L 102 (H)   Glucose Latest Ref Range: 70 - 99 mg/dL 91   WBC Latest Ref Range: 4.0 - 11.0 10e9/L 10.4   Hemoglobin Latest Ref Range: 11.7 - 15.7 g/dL 8.2 (L)   Hematocrit Latest Ref Range: 35.0 - 47.0 % 23.2 (L)   Platelet Count Latest Ref Range: 150 - 450 10e9/L 344   RBC Count Latest Ref Range: 3.8 - 5.2 10e12/L 2.52 (L)   MCV Latest Ref Range: 78 - 100 fl 92   MCH Latest Ref Range: 26.5 - 33.0 pg 32.5   MCHC Latest Ref Range: 31.5 - 36.5 g/dL 35.3   RDW Latest Ref Range: 10.0 - 15.0 % 22.1 (H)   Diff Method Unknown Automated Method   % Neutrophils Latest Units: % 56.9   % Lymphocytes Latest Units: % 26.1   % Monocytes Latest Units: % 6.0   % Eosinophils Latest Units: % 8.8   % Basophils Latest Units: % 1.7   % Immature Granulocytes Latest Units: % 0.5   Nucleated RBCs Latest Ref Range: 0 /100 2 (H)   Absolute Neutrophil Latest Ref Range: 1.6 - 8.3 10e9/L 5.9   Absolute Lymphocytes Latest Ref Range: 0.8 - 5.3 10e9/L  2.7   Absolute Monocytes Latest Ref Range: 0.0 - 1.3 10e9/L 0.6   Absolute Eosinophils Latest Ref Range: 0.0 - 0.7 10e9/L 0.9 (H)   Absolute Basophils Latest Ref Range: 0.0 - 0.2 10e9/L 0.2   Abs Immature Granulocytes Latest Ref Range: 0 - 0.4 10e9/L 0.1   Absolute Nucleated RBC Unknown 0.3   % Retic Latest Ref Range: 0.5 - 2.0 % 11.5 (H)   Absolute Retic Latest Ref Range: 25 - 95 10e9/L 289.8 (H)       Imaging:   None recently    Ferriscan 3/25/21  Average liver iron concentration is 43 mg/g dry tissue (normal = 0.17 - 1.8)  Average liver iron concentration is 770 mmol/kg dry tissue (normal = 3 - 33)     Other findings: Diffuse hypodensity of  liver and spleen, consistent with secondary hemochromatosis.     ----------    Assessment:  Jennifer Cervantes is a 21 year old female with HbSS. Her disease has been complicated by stroke leading to significant cognitive delay and right sided hemiparesis, high anxiety leading to frequent pain crises on top of chronic pain syndrome, poor social structure at home with no real support, and iron overload secondary to repeated transfusions. She has had significant stressors at home and her 2021 has been marked by a long admission and separately, PE and SVT + DVT in RUE of unclear chronicity. She also has clear evidence of massive iron overload, which was suspected but is requiring urgent intervention.    Major Topics of the Visit:  1. Pain Management: We will maintain the current strategy of Oxycodone PRN and Oxycontin 10 mg q12h. NSAIDs and ASA on hold while on apixaban. Mental stress is also important here and we need to maintain a support structure and she can engage with her counselor. SW has engaged with her on this  2. Iron overload/Pharmacy Issues: She has been on Jadenu for a few months and got Desferal during her admission (never got a chance to do it in clinic). LIC was 43 (3/2021) so we need to act urgently. Desferal HD over 48 hours will hopefully start next weekend with  new Port. Her elevated AST/ALT may be associated with the iron and/or Jadenu but are improved today. We will monitor for now without medication modifications. Synthetic function for the liver seems to be intact  3. High RBC turnover: Jennifer really has a high degree of RBC turnover, more than most patients I have seen. Oxybryta led to more frequent pain episodes (chest pain, which was new) and did not change the RBC turnover so it was stopped in December. No change currently  4. Pulmonary Emboli + new RUE DVT (innominate vein) of unclear chronicity + new symptomatic R cephalic SVT this week: Switched to apixaban with full starter pack given the new clots. No obvious trigger. I will treat the SVT primarily given the symptoms, and the timing comes out for 6 weeks to be around the time she would have finished the rivaroxaban. We will re-address the potential for long-term anticoagulation in a month. Holding aspirin for 3 months.  5. Stroke sequelae on right side: I will reach out to PM&R to see if they may be able to better assist with her weakness. I do not think a surgical approach is warranted at this point.  Follow up: 2 weeks with Andrei velazquez.      Review of external notes as documented above   Review of the result(s) of each unique test - I did not order labs for this visit but Ferriscan from previous visit resulted and labs as above from the PM ED visit wer  Diagnosis or treatment significantly limited by social determinants of health - sickle cell disease, racial inequity, difficult social situation at home    30 min spent on the date of the encounter in chart review, patient visit, review of tests, documentation and/or discussion with other providers about the issues documented above.       Eric Duncan MD  Hematologist  Division of Hematology, Oncology, and Transplantation  [facility]  [employer] [facility] [place]  Pager: [phone]

## 2021-04-16 NOTE — LETTER
4/16/2021         RE: Jennifer Cervantes  [address]  [facility] [address]        Dear Colleague,    Thank you for referring your patient, Jennifer Cervantes, to the [facility]. Please see a copy of my visit note below.    Sickle Cell Outpatient Follow Up Visit      Date of encounter: Apr 16, 2021    Jennifer Cervantes is a 21 year old female who is being seen in clinic for hospital follow up and health maintenance related to SCD      Interval History: Jennifer's last 1-2 weeks have been a bit better than the previous once. She has not needed to come to the ED quite as often, though she has pain today (lower back) and wanted to see if she could get into infusion. She also stated that things are still tough at home and she no longer has a trust in her mom as PCA. She is living in an extended stay hotel with her cousin and has been for a week or so--she thinks being out of a toxic environment mentally at home has improved her pain management in the past week overall. She continues to deny any physical violence or abuse. She feels like she has a plan for her home situation figured out for now. She also wants to make sure she does not neglect her health. Last week she mentioned her port being difficult to manage for some nurses in the ED and we were able to coordinate for her to have new port placed Tuesday. Due to the new port being put in, the Desferal has not yet started though she is taking Jadenu and we will be able to do the infusions at a hotel. Her other request has to do with rehab for her wrist and right ankle. She said she has been frustrated by the lack of improvement with home exercises though she admits it has been a while since she followed up with PT/OT. She wants to know if she should see orthopedics or someone else. Symptomatically, she has not had any fever, headache, chest pain, dyspnea, or vision changes. No bleeding while on Apixaban.      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 She also has significant anxiety and depression with a strong anxiety about transferring to the adult clinic. She usually has pain crises secondary to the anxiety.      --------------------------------  Plan last reviewed with patient: 3/26/2021    Patient background: 22yo F, enjoys movies and kids though there are times where she does not really want to talk to people. Does not have a lot of social support at home.     Sickle Cell Disease History  Primary Hematologist: Perla  PCP: Raul  Genotype: SS  Acute Pain Crisis Treatment:    ER/Acute Care/Infusion Clinic:   o Morphine 2 mg IVP/SC Q1H X 3 doses  o Toradol x1 (avoid through early May due to being on anticoagulation)  o Maintenance IV fluids with LR  o Other: Benadryl PO and Zofran 8 mg IV PRN itching or nausea    Inpatient:  o Opioid: Morphine 2 mg IV Q1H PRN until PCA starts  o PCA plan:   - PCA button dose: Morphine 1 mg  - Lockout: 20 minutes  - Continuous Infusion: consider 1 mg/hr  - One hr max: 4 mg  o Other Medications: Benadryl, Zofran  o If PCA not working, she Has had success with ketamine starting at 4mg/h and advancing only to 6mg/h, as 8mg/h made her feel quite poorly.  o ASA on hold (ok to give celebrex)  o Supportive Care: Docusate, Senna  Chronic Pain Medications:    Home regimen Oxycontin 10 mg q12h, oxycodone 10 mg p.o. q.6 hours p.r.n. breakthrough pain.  She also continues on Celebrex, and Zoloft among other medications.    -Also benefits from mental health visits, acupuncture  Baseline Hemoglobin: 7 g/dl without chronic transfusions  Hydroxyurea use: Yes  H/O blood  transfusions: Yes, several (iron overload) Most recent 8/21/20    H/O Transfusion Reactions: no    Antibodies:none  H/O Acute Chest Syndrome: Yes    Last episode: 10/2019     ICU/intubation: unsure  H/O Stroke: Yes (managed with chronic transfusions in the past)  H/O VTE: Yes (2/2021)  H/O Cholecystectomy or Splenectomy: no  H/O Asthma, Pulm HTN, AVN, Leg Ulcers, Nephropathy, Retinopathy, etc: Iron overload, asthma, chronic lung disease, mild developmental delay from early stroke    -------------------------------------------    Sickle Cell Disease Comprehensive Checklist    Bone Health/Avascular Necrosis Screening/Symptoms (each visit): no new concerns today    Leg Ulcer evaluation (every visit): none    Hypertension (every visit): mildly hypertensive recently but improved later in evening and previous day     Last urinalysis for microalbuminuria/CKD (annually): within last year    Last pulmonary evaluation (asthma, AMAN, pulm HTN): within last year    Stroke/silent cerebral infarct Hx (Y/N): Yes TIA ~2014, first event ~age 1 with full stroke and R sided weakness    Last PCP Visit: 8/2020    Vaccines:  o PCV13: 5/13/19  o Pneumovax (PPSV23): 3/04, 10/09, 7/12/19 (next due 7/2024)  o Menactra: 4/2010, 9/2015 (MCV overdue Sept 2020)  o Influenza: got 20-21 vaccine per self report    Audiology (chelation): done 6/2020, normal    Past Medical History:  Past Medical History:   Diagnosis Date     Anemia      Anxiety      Bleeding disorder (H)      Blood clotting disorder (H)      Cerebral infarction (H) 2015     Cognitive developmental delay     low IQ. 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 (H)     right hand contractures     Iron overload due to repeated red blood cell transfusions      Migraines      Multiple subsegmental pulmonary emboli without acute cor pulmonale (H) 02/01/2021      Oppositional defiant behavior      Superficial venous thrombosis of arm, right 03/25/2021     Uncomplicated asthma        Past Surgical History:  Past Surgical History:   Procedure Laterality Date     AS INSERT TUNNELED CV 2 CATH W/O PORT/PUMP       C BREAST REDUCTION (INCLUDES LIPO) TIER 3 Bilateral 04/23/2019     CHOLECYSTECTOMY       IR CVC NON TUNNEL PLACEMENT  04/07/2020     REPAIR TENDON ELBOW Right 10/02/2019    Procedure: Right Elbow Flexor Lengthening, Flexor Pronator Slide Of Wrist and Finger, Thumb Adductor Lengthening;  Surgeon: Anai Franco MD;  Location: [facility] OR     TONSILLECTOMY Bilateral 10/02/2019    Procedure: Bilateral Tonsillectomy;  Surgeon: Farhana Guy MD;  Location: [facility] OR       Family History:   Family History   Problem Relation Age of Onset     Sickle Cell Trait Mother      Hypertension Mother      Asthma Mother      Sickle Cell Trait Father        Social History:  Social History     Socioeconomic History     Marital status: Single     Spouse name: Not on file     Number of children: Not on file     Years of education: Not on file     Highest education level: Not on file   Occupational History     Not on file   Social Needs     Financial resource strain: Not on file     Food insecurity     Worry: Not on file     Inability: Not on file     Transportation needs     Medical: Not on file     Non-medical: Not on file   Tobacco Use     Smoking status: Never Smoker     Smokeless tobacco: Never Used   Substance and Sexual Activity     Alcohol use: Not Currently     Alcohol/week: 0.0 standard drinks     Drug use: Never     Sexual activity: Not Currently     Partners: Male     Birth control/protection: Other   Lifestyle     Physical activity     Days per week: Not on file     Minutes per session: Not on file     Stress: Not on file   Relationships     Social connections     Talks on phone: Not on file     Gets together: Not on file     Attends Holiness service: Not on file      "Active member of club or organization: Not on file     Attends meetings of clubs or organizations: Not on file     Relationship status: Not on file     Intimate partner violence     Fear of current or ex partner: Not on file     Emotionally abused: Not on file     Physically abused: Not on file     Forced sexual activity: Not on file   Other Topics Concern     Parent/sibling w/ CABG, MI or angioplasty before 65F 55M? Not Asked   Social History Narrative    Lives with mom and extended family but \"toxic environment\" per her report. She would like to move out but cannot financially do so. She has minimal support at home despite her significant SCD comorbidities and cognitive delay from stroke.       Medications:  Current Outpatient Medications   Medication     acetaminophen (TYLENOL) 325 MG tablet     albuterol (PROAIR HFA/PROVENTIL HFA/VENTOLIN HFA) 108 (90 Base) MCG/ACT inhaler     albuterol (PROVENTIL) (2.5 MG/3ML) 0.083% neb solution     Apixaban Starter Pack (ELIQUIS DVT/PE STARTER PACK) 5 MG TBPK     ARIPiprazole (ABILIFY) 2 MG tablet     aspirin (ASPIRIN) 81 MG EC tablet     budesonide-formoterol (SYMBICORT) 160-4.5 MCG/ACT Inhaler     celecoxib (CELEBREX) 100 MG capsule     diclofenac (VOLTAREN) 1 % topical gel     diphenhydrAMINE (BENADRYL) 25 MG capsule     EPINEPHrine (ANY BX GENERIC EQUIV) 0.3 MG/0.3ML injection 2-pack     Hydroxyurea 1000 MG TABS     JADENU 360 MG tablet     medroxyPROGESTERone (DEPO-PROVERA) 150 MG/ML IM injection     naloxone (NARCAN) 4 MG/0.1ML nasal spray     ondansetron (ZOFRAN) 8 MG tablet     oxyCODONE (OXYCONTIN) 10 MG 12 hr tablet     oxyCODONE IR (ROXICODONE) 15 MG tablet     sertraline (ZOLOFT) 100 MG tablet     No current facility-administered medications for this visit.          Physical Exam:   BP (!) 152/89   Pulse 119   Temp 98.3  F (36.8  C) (Oral)   Wt 76.3 kg (168 lb 3.2 oz)   SpO2 96%   BMI 28.87 kg/m       GEN: young  female, appears comfortable in " clinic, seems to be in a good mood  HEENT: no icterus, MMM  CV: RRR, no murmurs  NECK: no visible asymmetry  RESP: speaking in full sentences, no increased work of breathing, clear to auscultation  SKIN: no bruising noted  MSK: contracture at right wrist (chronic), now with slight edema compared to previous visits but non painful to touch  NEURO: alert and oriented      Labs:   Results for HIMANSHU AL (MRN [id number]) as of 4/19/2021 11:10   Ref. Range 4/16/2021 18:32   Sodium Latest Ref Range: 133 - 144 mmol/L 135   Potassium Latest Ref Range: 3.4 - 5.3 mmol/L 4.0   Chloride Latest Ref Range: 94 - 109 mmol/L 106   Carbon Dioxide Latest Ref Range: 20 - 32 mmol/L 22   Urea Nitrogen Latest Ref Range: 7 - 30 mg/dL 13   Creatinine Latest Ref Range: 0.52 - 1.04 mg/dL 0.60   GFR Estimate Latest Ref Range: >60 mL/min/1.73_m2 >90   GFR Estimate If Black Latest Ref Range: >60 mL/min/1.73_m2 >90   Calcium Latest Ref Range: 8.5 - 10.1 mg/dL 9.0   Anion Gap Latest Ref Range: 3 - 14 mmol/L 7   Albumin Latest Ref Range: 3.4 - 5.0 g/dL 4.1   Protein Total Latest Ref Range: 6.8 - 8.8 g/dL 8.6   Bilirubin Total Latest Ref Range: 0.2 - 1.3 mg/dL 2.9 (H)   Alkaline Phosphatase Latest Ref Range: 40 - 150 U/L 80   ALT Latest Ref Range: 0 - 50 U/L 112 (H)   AST Latest Ref Range: 0 - 45 U/L 102 (H)   Glucose Latest Ref Range: 70 - 99 mg/dL 91   WBC Latest Ref Range: 4.0 - 11.0 10e9/L 10.4   Hemoglobin Latest Ref Range: 11.7 - 15.7 g/dL 8.2 (L)   Hematocrit Latest Ref Range: 35.0 - 47.0 % 23.2 (L)   Platelet Count Latest Ref Range: 150 - 450 10e9/L 344   RBC Count Latest Ref Range: 3.8 - 5.2 10e12/L 2.52 (L)   MCV Latest Ref Range: 78 - 100 fl 92   MCH Latest Ref Range: 26.5 - 33.0 pg 32.5   MCHC Latest Ref Range: 31.5 - 36.5 g/dL 35.3   RDW Latest Ref Range: 10.0 - 15.0 % 22.1 (H)   Diff Method Unknown Automated Method   % Neutrophils Latest Units: % 56.9   % Lymphocytes Latest Units: % 26.1   % Monocytes Latest Units: % 6.0   %  Eosinophils Latest Units: % 8.8   % Basophils Latest Units: % 1.7   % Immature Granulocytes Latest Units: % 0.5   Nucleated RBCs Latest Ref Range: 0 /100 2 (H)   Absolute Neutrophil Latest Ref Range: 1.6 - 8.3 10e9/L 5.9   Absolute Lymphocytes Latest Ref Range: 0.8 - 5.3 10e9/L 2.7   Absolute Monocytes Latest Ref Range: 0.0 - 1.3 10e9/L 0.6   Absolute Eosinophils Latest Ref Range: 0.0 - 0.7 10e9/L 0.9 (H)   Absolute Basophils Latest Ref Range: 0.0 - 0.2 10e9/L 0.2   Abs Immature Granulocytes Latest Ref Range: 0 - 0.4 10e9/L 0.1   Absolute Nucleated RBC Unknown 0.3   % Retic Latest Ref Range: 0.5 - 2.0 % 11.5 (H)   Absolute Retic Latest Ref Range: 25 - 95 10e9/L 289.8 (H)       Imaging:   None recently    Ferriscan 3/25/21  Average liver iron concentration is 43 mg/g dry tissue (normal = 0.17 - 1.8)  Average liver iron concentration is 770 mmol/kg dry tissue (normal = 3 - 33)     Other findings: Diffuse hypodensity of  liver and spleen, consistent with secondary hemochromatosis.     ----------    Assessment:  Jennifer Cervantes is a 21 year old female with HbSS. Her disease has been complicated by stroke leading to significant cognitive delay and right sided hemiparesis, high anxiety leading to frequent pain crises on top of chronic pain syndrome, poor social structure at home with no real support, and iron overload secondary to repeated transfusions. She has had significant stressors at home and her 2021 has been marked by a long admission and separately, PE and SVT + DVT in RUE of unclear chronicity. She also has clear evidence of massive iron overload, which was suspected but is requiring urgent intervention.    Major Topics of the Visit:  1. Pain Management: We will maintain the current strategy of Oxycodone PRN and Oxycontin 10 mg q12h. NSAIDs and ASA on hold while on apixaban. Mental stress is also important here and we need to maintain a support structure and she can engage with her counselor. SW has engaged with  her on this  2. Iron overload/Pharmacy Issues: She has been on Jadenu for a few months and got Desferal during her admission (never got a chance to do it in clinic). LIC was 43 (3/2021) so we need to act urgently. Desferal HD over 48 hours will hopefully start next weekend with new Port. Her elevated AST/ALT may be associated with the iron and/or Jadenu but are improved today. We will monitor for now without medication modifications. Synthetic function for the liver seems to be intact  3. High RBC turnover: Jennifer really has a high degree of RBC turnover, more than most patients I have seen. Oxybryta led to more frequent pain episodes (chest pain, which was new) and did not change the RBC turnover so it was stopped in December. No change currently  4. Pulmonary Emboli + new RUE DVT (innominate vein) of unclear chronicity + new symptomatic R cephalic SVT this week: Switched to apixaban with full starter pack given the new clots. No obvious trigger. I will treat the SVT primarily given the symptoms, and the timing comes out for 6 weeks to be around the time she would have finished the rivaroxaban. We will re-address the potential for long-term anticoagulation in a month. Holding aspirin for 3 months.  5. Stroke sequelae on right side: I will reach out to PM&R to see if they may be able to better assist with her weakness. I do not think a surgical approach is warranted at this point.  Follow up: 2 weeks with Andrei velazquez.      Review of external notes as documented above   Review of the result(s) of each unique test - I did not order labs for this visit but Ferriscan from previous visit resulted and labs as above from the PM ED visit wer  Diagnosis or treatment significantly limited by social determinants of health - sickle cell disease, racial inequity, difficult social situation at home    30 min spent on the date of the encounter in chart review, patient visit, review of tests, documentation and/or discussion with  other providers about the issues documented above.       Eric Duncan MD  Hematologist  Division of Hematology, Oncology, and Transplantation  [facility]  [facility]  Pager: [phone]              Again, thank you for allowing me to participate in the care of your patient.        Sincerely,        Eric Duncan MD

## 2021-04-16 NOTE — ED TRIAGE NOTES
Presented ambulatory c/o generalized constant pain that began around 2 pm today. Took regular home dose of pain meds with no relief. With new unset of chest tightness while waiting in triage.

## 2021-04-16 NOTE — LETTER
4/16/2021         RE: Jennifer Cervantes  [address]  [facility] [address]      Sickle Cell Outpatient Follow Up Visit      Date of encounter: Apr 16, 2021    Jennifer Cervantes is a 21 year old female who is being seen in clinic for hospital follow up and health maintenance related to SCD      Interval History: Jennifer's last 1-2 weeks have been a bit better than the previous once. She has not needed to come to the ED quite as often, though she has pain today (lower back) and wanted to see if she could get into infusion. She also stated that things are still tough at home and she no longer has a trust in her mom as PCA. She is living in an extended stay hotel with her cousin and has been for a week or so--she thinks being out of a toxic environment mentally at home has improved her pain management in the past week overall. She continues to deny any physical violence or abuse. She feels like she has a plan for her home situation figured out for now. She also wants to make sure she does not neglect her health. Last week she mentioned her port being difficult to manage for some nurses in the ED and we were able to coordinate for her to have new port placed Tuesday. Due to the new port being put in, the Desferal has not yet started though she is taking Jadenu and we will be able to do the infusions at a hotel. Her other request has to do with rehab for her wrist and right ankle. She said she has been frustrated by the lack of improvement with home exercises though she admits it has been a while since she followed up with PT/OT. She wants to know if she should see orthopedics or someone else. Symptomatically, she has not had any fever, headache, chest pain, dyspnea, or vision changes. No bleeding while on Apixaban.      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 She also has significant anxiety and depression with a strong anxiety about transferring to the adult clinic. She usually has pain crises secondary to the anxiety.      --------------------------------  Plan last reviewed with patient: 3/26/2021    Patient background: 22yo F, enjoys movies and kids though there are times where she does not really want to talk to people. Does not have a lot of social support at home.     Sickle Cell Disease History  Primary Hematologist: Perla  PCP: Raul  Genotype: SS  Acute Pain Crisis Treatment:    ER/Acute Care/Infusion Clinic:   o Morphine 2 mg IVP/SC Q1H X 3 doses  o Toradol x1 (avoid through early May due to being on anticoagulation)  o Maintenance IV fluids with LR  o Other: Benadryl PO and Zofran 8 mg IV PRN itching or nausea    Inpatient:  o Opioid: Morphine 2 mg IV Q1H PRN until PCA starts  o PCA plan:   - PCA button dose: Morphine 1 mg  - Lockout: 20 minutes  - Continuous Infusion: consider 1 mg/hr  - One hr max: 4 mg  o Other Medications: Benadryl, Zofran  o If PCA not working, she Has had success with ketamine starting at 4mg/h and advancing only to 6mg/h, as 8mg/h made her feel quite poorly.  o ASA on hold (ok to give celebrex)  o Supportive Care: Docusate Senna  Chronic Pain Medications:    Home regimen Oxycontin 10 mg q12h, oxycodone 10 mg p.o. q.6 hours p.r.n. breakthrough pain.  She also continues on Celebrex, and Zoloft among other medications.    -Also benefits from mental health visits, acupuncture  Baseline Hemoglobin: 7 g/dl without chronic transfusions  Hydroxyurea use: Yes  H/O blood transfusions: Yes, several (iron overload) Most recent 8/21/20    H/O Transfusion Reactions: no    Antibodies:none  H/O Acute Chest Syndrome: Yes    Last episode: 10/2019     ICU/intubation:  unsure  H/O Stroke: Yes (managed with chronic transfusions in the past)  H/O VTE: Yes (2/2021)  H/O Cholecystectomy or Splenectomy: no  H/O Asthma, Pulm HTN, AVN, Leg Ulcers, Nephropathy, Retinopathy, etc: Iron overload, asthma, chronic lung disease, mild developmental delay from early stroke    -------------------------------------------    Sickle Cell Disease Comprehensive Checklist    Bone Health/Avascular Necrosis Screening/Symptoms (each visit): no new concerns today    Leg Ulcer evaluation (every visit): none    Hypertension (every visit): mildly hypertensive recently but improved later in evening and previous day     Last urinalysis for microalbuminuria/CKD (annually): within last year    Last pulmonary evaluation (asthma, AMAN, pulm HTN): within last year    Stroke/silent cerebral infarct Hx (Y/N): Yes TIA ~2014, first event ~age 1 with full stroke and R sided weakness    Last PCP Visit: 8/2020    Vaccines:  o PCV13: 5/13/19  o Pneumovax (PPSV23): 3/04, 10/09, 7/12/19 (next due 7/2024)  o Menactra: 4/2010, 9/2015 (MCV overdue Sept 2020)  o Influenza: got 20-21 vaccine per self report    Audiology (chelation): done 6/2020, normal    Past Medical History:  Past Medical History:   Diagnosis Date     Anemia      Anxiety      Bleeding disorder (H)      Blood clotting disorder (H)      Cerebral infarction (H) 2015     Cognitive developmental delay     low IQ. 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 (H)     right hand contractures     Iron overload due to repeated red blood cell transfusions      Migraines      Multiple subsegmental pulmonary emboli without acute cor pulmonale (H) 02/01/2021     Oppositional defiant behavior      Superficial venous thrombosis of arm, right 03/25/2021     Uncomplicated asthma        Past Surgical History:  Past Surgical History:   Procedure Laterality  Date     AS INSERT TUNNELED CV 2 CATH W/O PORT/PUMP       C BREAST REDUCTION (INCLUDES LIPO) TIER 3 Bilateral 04/23/2019     CHOLECYSTECTOMY       IR CVC NON TUNNEL PLACEMENT  04/07/2020     REPAIR TENDON ELBOW Right 10/02/2019    Procedure: Right Elbow Flexor Lengthening, Flexor Pronator Slide Of Wrist and Finger, Thumb Adductor Lengthening;  Surgeon: Anai Franco MD;  Location: [facility] OR     TONSILLECTOMY Bilateral 10/02/2019    Procedure: Bilateral Tonsillectomy;  Surgeon: Farhana Guy MD;  Location: [facility] OR       Family History:   Family History   Problem Relation Age of Onset     Sickle Cell Trait Mother      Hypertension Mother      Asthma Mother      Sickle Cell Trait Father        Social History:  Social History     Socioeconomic History     Marital status: Single     Spouse name: Not on file     Number of children: Not on file     Years of education: Not on file     Highest education level: Not on file   Occupational History     Not on file   Social Needs     Financial resource strain: Not on file     Food insecurity     Worry: Not on file     Inability: Not on file     Transportation needs     Medical: Not on file     Non-medical: Not on file   Tobacco Use     Smoking status: Never Smoker     Smokeless tobacco: Never Used   Substance and Sexual Activity     Alcohol use: Not Currently     Alcohol/week: 0.0 standard drinks     Drug use: Never     Sexual activity: Not Currently     Partners: Male     Birth control/protection: Other   Lifestyle     Physical activity     Days per week: Not on file     Minutes per session: Not on file     Stress: Not on file   Relationships     Social connections     Talks on phone: Not on file     Gets together: Not on file     Attends Protestant service: Not on file     Active member of club or organization: Not on file     Attends meetings of clubs or organizations: Not on file     Relationship status: Not on file     Intimate partner violence     Fear of  "current or ex partner: Not on file     Emotionally abused: Not on file     Physically abused: Not on file     Forced sexual activity: Not on file   Other Topics Concern     Parent/sibling w/ CABG, MI or angioplasty before 65F 55M? Not Asked   Social History Narrative    Lives with mom and extended family but \"toxic environment\" per her report. She would like to move out but cannot financially do so. She has minimal support at home despite her significant SCD comorbidities and cognitive delay from stroke.       Medications:  Current Outpatient Medications   Medication     acetaminophen (TYLENOL) 325 MG tablet     albuterol (PROAIR HFA/PROVENTIL HFA/VENTOLIN HFA) 108 (90 Base) MCG/ACT inhaler     albuterol (PROVENTIL) (2.5 MG/3ML) 0.083% neb solution     Apixaban Starter Pack (ELIQUIS DVT/PE STARTER PACK) 5 MG TBPK     ARIPiprazole (ABILIFY) 2 MG tablet     aspirin (ASPIRIN) 81 MG EC tablet     budesonide-formoterol (SYMBICORT) 160-4.5 MCG/ACT Inhaler     celecoxib (CELEBREX) 100 MG capsule     diclofenac (VOLTAREN) 1 % topical gel     diphenhydrAMINE (BENADRYL) 25 MG capsule     EPINEPHrine (ANY BX GENERIC EQUIV) 0.3 MG/0.3ML injection 2-pack     Hydroxyurea 1000 MG TABS     JADENU 360 MG tablet     medroxyPROGESTERone (DEPO-PROVERA) 150 MG/ML IM injection     naloxone (NARCAN) 4 MG/0.1ML nasal spray     ondansetron (ZOFRAN) 8 MG tablet     oxyCODONE (OXYCONTIN) 10 MG 12 hr tablet     oxyCODONE IR (ROXICODONE) 15 MG tablet     sertraline (ZOLOFT) 100 MG tablet     No current facility-administered medications for this visit.          Physical Exam:   BP (!) 152/89   Pulse 119   Temp 98.3  F (36.8  C) (Oral)   Wt 76.3 kg (168 lb 3.2 oz)   SpO2 96%   BMI 28.87 kg/m       GEN: young  female, appears comfortable in clinic, seems to be in a good mood  HEENT: no icterus, MMM  CV: RRR, no murmurs  NECK: no visible asymmetry  RESP: speaking in full sentences, no increased work of breathing, clear to " auscultation  SKIN: no bruising noted  MSK: contracture at right wrist (chronic), now with slight edema compared to previous visits but non painful to touch  NEURO: alert and oriented      Labs:   Results for HIMANSHU AL (MRN [id number]) as of 4/19/2021 11:10   Ref. Range 4/16/2021 18:32   Sodium Latest Ref Range: 133 - 144 mmol/L 135   Potassium Latest Ref Range: 3.4 - 5.3 mmol/L 4.0   Chloride Latest Ref Range: 94 - 109 mmol/L 106   Carbon Dioxide Latest Ref Range: 20 - 32 mmol/L 22   Urea Nitrogen Latest Ref Range: 7 - 30 mg/dL 13   Creatinine Latest Ref Range: 0.52 - 1.04 mg/dL 0.60   GFR Estimate Latest Ref Range: >60 mL/min/1.73_m2 >90   GFR Estimate If Black Latest Ref Range: >60 mL/min/1.73_m2 >90   Calcium Latest Ref Range: 8.5 - 10.1 mg/dL 9.0   Anion Gap Latest Ref Range: 3 - 14 mmol/L 7   Albumin Latest Ref Range: 3.4 - 5.0 g/dL 4.1   Protein Total Latest Ref Range: 6.8 - 8.8 g/dL 8.6   Bilirubin Total Latest Ref Range: 0.2 - 1.3 mg/dL 2.9 (H)   Alkaline Phosphatase Latest Ref Range: 40 - 150 U/L 80   ALT Latest Ref Range: 0 - 50 U/L 112 (H)   AST Latest Ref Range: 0 - 45 U/L 102 (H)   Glucose Latest Ref Range: 70 - 99 mg/dL 91   WBC Latest Ref Range: 4.0 - 11.0 10e9/L 10.4   Hemoglobin Latest Ref Range: 11.7 - 15.7 g/dL 8.2 (L)   Hematocrit Latest Ref Range: 35.0 - 47.0 % 23.2 (L)   Platelet Count Latest Ref Range: 150 - 450 10e9/L 344   RBC Count Latest Ref Range: 3.8 - 5.2 10e12/L 2.52 (L)   MCV Latest Ref Range: 78 - 100 fl 92   MCH Latest Ref Range: 26.5 - 33.0 pg 32.5   MCHC Latest Ref Range: 31.5 - 36.5 g/dL 35.3   RDW Latest Ref Range: 10.0 - 15.0 % 22.1 (H)   Diff Method Unknown Automated Method   % Neutrophils Latest Units: % 56.9   % Lymphocytes Latest Units: % 26.1   % Monocytes Latest Units: % 6.0   % Eosinophils Latest Units: % 8.8   % Basophils Latest Units: % 1.7   % Immature Granulocytes Latest Units: % 0.5   Nucleated RBCs Latest Ref Range: 0 /100 2 (H)   Absolute Neutrophil Latest  Ref Range: 1.6 - 8.3 10e9/L 5.9   Absolute Lymphocytes Latest Ref Range: 0.8 - 5.3 10e9/L 2.7   Absolute Monocytes Latest Ref Range: 0.0 - 1.3 10e9/L 0.6   Absolute Eosinophils Latest Ref Range: 0.0 - 0.7 10e9/L 0.9 (H)   Absolute Basophils Latest Ref Range: 0.0 - 0.2 10e9/L 0.2   Abs Immature Granulocytes Latest Ref Range: 0 - 0.4 10e9/L 0.1   Absolute Nucleated RBC Unknown 0.3   % Retic Latest Ref Range: 0.5 - 2.0 % 11.5 (H)   Absolute Retic Latest Ref Range: 25 - 95 10e9/L 289.8 (H)       Imaging:   None recently    Ferriscan 3/25/21  Average liver iron concentration is 43 mg/g dry tissue (normal = 0.17 - 1.8)  Average liver iron concentration is 770 mmol/kg dry tissue (normal = 3 - 33)     Other findings: Diffuse hypodensity of  liver and spleen, consistent with secondary hemochromatosis.     ----------    Assessment:  Jennifer Cervantes is a 21 year old female with HbSS. Her disease has been complicated by stroke leading to significant cognitive delay and right sided hemiparesis, high anxiety leading to frequent pain crises on top of chronic pain syndrome, poor social structure at home with no real support, and iron overload secondary to repeated transfusions. She has had significant stressors at home and her 2021 has been marked by a long admission and separately, PE and SVT + DVT in RUE of unclear chronicity. She also has clear evidence of massive iron overload, which was suspected but is requiring urgent intervention.    Major Topics of the Visit:  1. Pain Management: We will maintain the current strategy of Oxycodone PRN and Oxycontin 10 mg q12h. NSAIDs and ASA on hold while on apixaban. Mental stress is also important here and we need to maintain a support structure and she can engage with her counselor. SW has engaged with her on this  2. Iron overload/Pharmacy Issues: She has been on Jadenu for a few months and got Desferal during her admission (never got a chance to do it in clinic). LIC was 43 (3/2021) so  we need to act urgently. Desferal HD over 48 hours will hopefully start next weekend with new Port. Her elevated AST/ALT may be associated with the iron and/or Jadenu but are improved today. We will monitor for now without medication modifications. Synthetic function for the liver seems to be intact  3. High RBC turnover: Jennifer really has a high degree of RBC turnover, more than most patients I have seen. Oxybryta led to more frequent pain episodes (chest pain, which was new) and did not change the RBC turnover so it was stopped in December. No change currently  4. Pulmonary Emboli + new RUE DVT (innominate vein) of unclear chronicity + new symptomatic R cephalic SVT this week: Switched to apixaban with full starter pack given the new clots. No obvious trigger. I will treat the SVT primarily given the symptoms, and the timing comes out for 6 weeks to be around the time she would have finished the rivaroxaban. We will re-address the potential for long-term anticoagulation in a month. Holding aspirin for 3 months.  5. Stroke sequelae on right side: I will reach out to PM&R to see if they may be able to better assist with her weakness. I do not think a surgical approach is warranted at this point.  Follow up: 2 weeks with Andrei velazquez.      Review of external notes as documented above   Review of the result(s) of each unique test - I did not order labs for this visit but Ferriscan from previous visit resulted and labs as above from the PM ED visit wer  Diagnosis or treatment significantly limited by social determinants of health - sickle cell disease, racial inequity, difficult social situation at home    30 min spent on the date of the encounter in chart review, patient visit, review of tests, documentation and/or discussion with other providers about the issues documented above.       Eric Duncan MD  Hematologist  Division of Hematology, Oncology, and Transplantation  [facility]  [facility]  [facility]  Pager: [phone]                Eric Duncan MD

## 2021-04-16 NOTE — PATIENT INSTRUCTIONS
For the IR procedure, take your apixaban Tuesday morning (24 hours before) and then do not take the nighttime dose. Then, do not take apixaban all of Wednesday and wait until 24 hours after the port placement to restart it. You can get back on your current dosing at that time (Thursday morning).

## 2021-04-17 ENCOUNTER — HOSPITAL ENCOUNTER (EMERGENCY)
Facility: CLINIC | Age: 21
Discharge: HOME | End: 2021-04-18
Attending: STUDENT IN AN ORGANIZED HEALTH CARE EDUCATION/TRAINING PROGRAM | Admitting: STUDENT IN AN ORGANIZED HEALTH CARE EDUCATION/TRAINING PROGRAM
Payer: COMMERCIAL

## 2021-04-17 ENCOUNTER — APPOINTMENT (OUTPATIENT)
Dept: GENERAL RADIOLOGY | Facility: CLINIC | Age: 21
End: 2021-04-17
Attending: STUDENT IN AN ORGANIZED HEALTH CARE EDUCATION/TRAINING PROGRAM
Payer: COMMERCIAL

## 2021-04-17 DIAGNOSIS — Z11.59 ENCOUNTER FOR SCREENING FOR OTHER VIRAL DISEASES: ICD-10-CM

## 2021-04-17 DIAGNOSIS — D57.00 SICKLE CELL PAIN CRISIS (H): ICD-10-CM

## 2021-04-17 LAB
BASOPHILS # BLD AUTO: 0.2 10E9/L (ref 0–0.2)
BASOPHILS NFR BLD AUTO: 1.8 %
DIFFERENTIAL METHOD BLD: ABNORMAL
EOSINOPHIL # BLD AUTO: 1 10E9/L (ref 0–0.7)
EOSINOPHIL NFR BLD AUTO: 8.3 %
ERYTHROCYTE [DISTWIDTH] IN BLOOD BY AUTOMATED COUNT: 23.2 % (ref 10–15)
HCT VFR BLD AUTO: 22.4 % (ref 35–47)
HGB BLD-MCNC: 7.8 G/DL (ref 11.7–15.7)
IMM GRANULOCYTES # BLD: 0.1 10E9/L (ref 0–0.4)
IMM GRANULOCYTES NFR BLD: 0.7 %
INTERPRETATION ECG - MUSE: NORMAL
LYMPHOCYTES # BLD AUTO: 3.4 10E9/L (ref 0.8–5.3)
LYMPHOCYTES NFR BLD AUTO: 28 %
MCH RBC QN AUTO: 32.2 PG (ref 26.5–33)
MCHC RBC AUTO-ENTMCNC: 34.8 G/DL (ref 31.5–36.5)
MCV RBC AUTO: 93 FL (ref 78–100)
MONOCYTES # BLD AUTO: 0.8 10E9/L (ref 0–1.3)
MONOCYTES NFR BLD AUTO: 6.3 %
NEUTROPHILS # BLD AUTO: 6.6 10E9/L (ref 1.6–8.3)
NEUTROPHILS NFR BLD AUTO: 54.9 %
NRBC # BLD AUTO: 0.3 10*3/UL
NRBC BLD AUTO-RTO: 3 /100
PLATELET # BLD AUTO: 376 10E9/L (ref 150–450)
RBC # BLD AUTO: 2.42 10E12/L (ref 3.8–5.2)
SARS-COV-2 RNA RESP QL NAA+PROBE: NORMAL
SPECIMEN SOURCE: NORMAL
WBC # BLD AUTO: 12.1 10E9/L (ref 4–11)

## 2021-04-17 PROCEDURE — 96376 TX/PRO/DX INJ SAME DRUG ADON: CPT | Performed by: STUDENT IN AN ORGANIZED HEALTH CARE EDUCATION/TRAINING PROGRAM

## 2021-04-17 PROCEDURE — 85025 COMPLETE CBC W/AUTO DIFF WBC: CPT | Performed by: STUDENT IN AN ORGANIZED HEALTH CARE EDUCATION/TRAINING PROGRAM

## 2021-04-17 PROCEDURE — 99284 EMERGENCY DEPT VISIT MOD MDM: CPT | Performed by: STUDENT IN AN ORGANIZED HEALTH CARE EDUCATION/TRAINING PROGRAM

## 2021-04-17 PROCEDURE — 99285 EMERGENCY DEPT VISIT HI MDM: CPT | Mod: 25 | Performed by: STUDENT IN AN ORGANIZED HEALTH CARE EDUCATION/TRAINING PROGRAM

## 2021-04-17 PROCEDURE — 96361 HYDRATE IV INFUSION ADD-ON: CPT | Performed by: STUDENT IN AN ORGANIZED HEALTH CARE EDUCATION/TRAINING PROGRAM

## 2021-04-17 PROCEDURE — 71046 X-RAY EXAM CHEST 2 VIEWS: CPT

## 2021-04-17 PROCEDURE — U0003 INFECTIOUS AGENT DETECTION BY NUCLEIC ACID (DNA OR RNA); SEVERE ACUTE RESPIRATORY SYNDROME CORONAVIRUS 2 (SARS-COV-2) (CORONAVIRUS DISEASE [COVID-19]), AMPLIFIED PROBE TECHNIQUE, MAKING USE OF HIGH THROUGHPUT TECHNOLOGIES AS DESCRIBED BY CMS-2020-01-R: HCPCS | Performed by: RADIOLOGY

## 2021-04-17 PROCEDURE — 96374 THER/PROPH/DIAG INJ IV PUSH: CPT | Performed by: STUDENT IN AN ORGANIZED HEALTH CARE EDUCATION/TRAINING PROGRAM

## 2021-04-17 PROCEDURE — 84484 ASSAY OF TROPONIN QUANT: CPT | Performed by: STUDENT IN AN ORGANIZED HEALTH CARE EDUCATION/TRAINING PROGRAM

## 2021-04-17 PROCEDURE — 71046 X-RAY EXAM CHEST 2 VIEWS: CPT | Mod: 26 | Performed by: RADIOLOGY

## 2021-04-17 PROCEDURE — 80053 COMPREHEN METABOLIC PANEL: CPT | Performed by: STUDENT IN AN ORGANIZED HEALTH CARE EDUCATION/TRAINING PROGRAM

## 2021-04-17 PROCEDURE — U0005 INFEC AGEN DETEC AMPLI PROBE: HCPCS | Performed by: RADIOLOGY

## 2021-04-17 RX ORDER — SODIUM CHLORIDE, SODIUM LACTATE, POTASSIUM CHLORIDE, CALCIUM CHLORIDE 600; 310; 30; 20 MG/100ML; MG/100ML; MG/100ML; MG/100ML
INJECTION, SOLUTION INTRAVENOUS CONTINUOUS
Status: DISCONTINUED | OUTPATIENT
Start: 2021-04-17 | End: 2021-04-18 | Stop reason: HOSPADM

## 2021-04-17 RX ORDER — MORPHINE SULFATE 2 MG/ML
2 INJECTION, SOLUTION INTRAMUSCULAR; INTRAVENOUS
Status: DISCONTINUED | OUTPATIENT
Start: 2021-04-17 | End: 2021-04-18

## 2021-04-17 NOTE — ED PROVIDER NOTES
ED Provider Note  [facility]      History   No chief complaint on file.    The history is provided by the patient and medical records.     Jennifer Cervantes is a 21 year old female with past medical history significant for sickle cell disease, CVA at age 1 and right upper extremity DVT who presents to the ED for evaluation of a sickle cell pain crisis.  Today, the patient was on the phone with her doctor, trying to schedule an infusion respiratory but they were unable to find an appointment for her.  The patient told her daughter that she could wait until Monday for infusion or otherwise come to the ED for pain management.  She states that the generalized body pain started after her phone call around 2 PM.  She states that she took all her medications, tried a warm bath, hot blankets and heating pads without any symptomatic relief.  She denies any fevers, cough, dysuria, vomiting, diarrhea or rash.  The patient is unsure of why she is having a flare.  She did note to have a new onset of chest tightness while waiting in triage.    Past Medical History  Past Medical History:   Diagnosis Date     Anemia      Anxiety      Bleeding disorder (H)      Blood clotting disorder (H)      Cerebral infarction (H) 2015     Cognitive developmental delay     low IQ. 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 (H)     right hand contractures     Iron overload due to repeated red blood cell transfusions      Migraines      Multiple subsegmental pulmonary emboli without acute cor pulmonale (H) 02/01/2021     Oppositional defiant behavior      Superficial venous thrombosis of arm, right 03/25/2021     Uncomplicated asthma      Past Surgical History:   Procedure Laterality Date     AS INSERT TUNNELED CV 2 CATH W/O PORT/PUMP       C BREAST REDUCTION (INCLUDES LIPO) TIER 3 Bilateral  04/23/2019     CHOLECYSTECTOMY       INSERT PORT VASCULAR ACCESS Left 4/21/2021    Procedure: INSERTION, VASCULAR ACCESS PORT (NOT SURE ON SIDE UNTIL REMOVAL);  Surgeon: Rajan More MD;  Location: [facility] OR     IR CVC NON TUNNEL PLACEMENT  04/07/2020     REMOVE PORT VASCULAR ACCESS Left 4/21/2021    Procedure: REMOVAL, VASCULAR ACCESS PORT LEFT;  Surgeon: Rajan More MD;  Location: [facility] OR     REPAIR TENDON ELBOW Right 10/02/2019    Procedure: Right Elbow Flexor Lengthening, Flexor Pronator Slide Of Wrist and Finger, Thumb Adductor Lengthening;  Surgeon: Anai Franco MD;  [facility]: [facility]     TONSILLECTOMY Bilateral 10/02/2019    Procedure: Bilateral Tonsillectomy;  Surgeon: Farhana Guy MD;  Location: [facility]     [START ON 4/24/2021] apixaban ANTICOAGULANT (ELIQUIS) 5 MG tablet  celecoxib (CELEBREX) 100 MG capsule  oxyCODONE (OXYCONTIN) 10 MG 12 hr tablet  oxyCODONE IR (ROXICODONE) 15 MG tablet  acetaminophen (TYLENOL) 325 MG tablet  albuterol (PROAIR HFA/PROVENTIL HFA/VENTOLIN HFA) 108 (90 Base) MCG/ACT inhaler  albuterol (PROVENTIL) (2.5 MG/3ML) 0.083% neb solution  Apixaban Starter Pack (ELIQUIS DVT/PE STARTER PACK) 5 MG TBPK  ARIPiprazole (ABILIFY) 2 MG tablet  aspirin (ASPIRIN) 81 MG EC tablet  budesonide-formoterol (SYMBICORT) 160-4.5 MCG/ACT Inhaler  diclofenac (VOLTAREN) 1 % topical gel  diphenhydrAMINE (BENADRYL) 25 MG capsule  EPINEPHrine (ANY BX GENERIC EQUIV) 0.3 MG/0.3ML injection 2-pack  Hydroxyurea 1000 MG TABS  JADENU 360 MG tablet  medroxyPROGESTERone (DEPO-PROVERA) 150 MG/ML IM injection  naloxone (NARCAN) 4 MG/0.1ML nasal spray  ondansetron (ZOFRAN) 8 MG tablet  sertraline (ZOLOFT) 100 MG tablet      Allergies   Allergen Reactions     Fish-Derived Products Hives     Seafood Hives     Contrast Dye      Diagnostic X-Ray Materials      Gadolinium      Family History  Family History   Problem Relation Age of Onset     Sickle Cell Trait Mother      Hypertension Mother       Asthma Mother      Sickle Cell Trait Father      Social History   Social History     Tobacco Use     Smoking status: Never Smoker     Smokeless tobacco: Never Used   Substance Use Topics     Alcohol use: Not Currently     Alcohol/week: 0.0 standard drinks     Drug use: Never      Past medical history, past surgical history, medications, allergies, family history, and social history were reviewed with the patient. No additional pertinent items.       Review of Systems   Constitutional: Negative for fever.   Respiratory: Positive for chest tightness. Negative for cough.    Gastrointestinal: Negative for diarrhea and vomiting.   Genitourinary: Negative for dysuria.   Musculoskeletal: Positive for myalgias.   Skin: Negative for rash.   All other systems reviewed and are negative.    A complete review of systems was performed with pertinent positives and negatives noted in the HPI, and all other systems negative.    Physical Exam   BP: 122/69  Pulse: 105  Resp: 16  Weight: 76.3 kg (168 lb 3.4 oz)  SpO2: 96 %  Physical Exam  Constitutional:       General: She is not in acute distress.     Appearance: She is not diaphoretic.   HENT:      Head: Atraumatic.      Mouth/Throat:      Pharynx: No oropharyngeal exudate.   Eyes:      General: No scleral icterus.     Pupils: Pupils are equal, round, and reactive to light.   Cardiovascular:      Heart sounds: Normal heart sounds.   Pulmonary:      Effort: No respiratory distress.      Breath sounds: Normal breath sounds.   Abdominal:      General: Bowel sounds are normal.      Palpations: Abdomen is soft.      Tenderness: There is no abdominal tenderness.   Musculoskeletal:         General: No tenderness.   Skin:     General: Skin is warm.      Findings: No rash.      Comments: The patient has a port over the left side of her chest.   Neurological:      Comments: The patient's right upper extremity is chronically contracted due to a previous stroke.         ED Course       Procedures               Results for orders placed or performed during the hospital encounter of 04/16/21   XR Chest Port 1 View     Status: None    Narrative    XR CHEST PORT 1 VW  4/16/2021 8:32 PM      HISTORY: pain crisis    COMPARISON: Chest x-ray 4/7/2021    TECHNIQUE: Semiupright frontal view of the chest    FINDINGS: No focal airspace opacity, pneumothorax, or pleural  effusion. Cardiomediastinal silhouette and pulmonary vasculature are  within normal limits. The trachea is midline. Upper abdomen is  unremarkable. No suspicious osseous lesion.    Left sided Port-A-Cath with tip projecting over the superior  cavoatrial junction.      Impression    IMPRESSION: No acute cardiopulmonary abnormality    I have personally reviewed the examination and initial interpretation  and I agree with the findings.    MANSI GAFFNEY MD   CBC with platelets differential     Status: Abnormal   Result Value Ref Range    WBC 10.4 4.0 - 11.0 10e9/L    RBC Count 2.52 (L) 3.8 - 5.2 10e12/L    Hemoglobin 8.2 (L) 11.7 - 15.7 g/dL    Hematocrit 23.2 (L) 35.0 - 47.0 %    MCV 92 78 - 100 fl    MCH 32.5 26.5 - 33.0 pg    MCHC 35.3 31.5 - 36.5 g/dL    RDW 22.1 (H) 10.0 - 15.0 %    Platelet Count 344 150 - 450 10e9/L    Diff Method Automated Method     % Neutrophils 56.9 %    % Lymphocytes 26.1 %    % Monocytes 6.0 %    % Eosinophils 8.8 %    % Basophils 1.7 %    % Immature Granulocytes 0.5 %    Nucleated RBCs 2 (H) 0 /100    Absolute Neutrophil 5.9 1.6 - 8.3 10e9/L    Absolute Lymphocytes 2.7 0.8 - 5.3 10e9/L    Absolute Monocytes 0.6 0.0 - 1.3 10e9/L    Absolute Eosinophils 0.9 (H) 0.0 - 0.7 10e9/L    Absolute Basophils 0.2 0.0 - 0.2 10e9/L    Abs Immature Granulocytes 0.1 0 - 0.4 10e9/L    Absolute Nucleated RBC 0.3    Comprehensive metabolic panel     Status: Abnormal   Result Value Ref Range    Sodium 135 133 - 144 mmol/L    Potassium 4.0 3.4 - 5.3 mmol/L    Chloride 106 94 - 109 mmol/L    Carbon Dioxide 22 20 - 32 mmol/L    Anion  Gap 7 3 - 14 mmol/L    Glucose 91 70 - 99 mg/dL    Urea Nitrogen 13 7 - 30 mg/dL    Creatinine 0.60 0.52 - 1.04 mg/dL    GFR Estimate >90 >60 mL/min/[1.73_m2]    GFR Estimate If Black >90 >60 mL/min/[1.73_m2]    Calcium 9.0 8.5 - 10.1 mg/dL    Bilirubin Total 2.9 (H) 0.2 - 1.3 mg/dL    Albumin 4.1 3.4 - 5.0 g/dL    Protein Total 8.6 6.8 - 8.8 g/dL    Alkaline Phosphatase 80 40 - 150 U/L     (H) 0 - 50 U/L     (H) 0 - 45 U/L   Reticulocyte count     Status: Abnormal   Result Value Ref Range    % Retic 11.5 (H) 0.5 - 2.0 %    Absolute Retic 289.8 (H) 25 - 95 10e9/L   EKG 12 lead     Status: None   Result Value Ref Range    Interpretation ECG Click View Image link to view waveform and result    EKG 12 lead     Status: None   Result Value Ref Range    Interpretation ECG Click View Image link to view waveform and result      Medications   morphine (PF) injection 2 mg (2 mg Intravenous Given 4/16/21 2021)   lactated ringers BOLUS 500 mL (0 mLs Intravenous Stopped 4/16/21 2207)   morphine (PF) injection 2 mg (2 mg Intravenous Given 4/16/21 2218)        Assessments & Plan (with Medical Decision Making)     20 yo f pmshx as above here w/ sickle cell pain, no red flags for acute chest syndrome, received drugs per care plan, was stable for discharge home.    I have reviewed the nursing notes. I have reviewed the findings, diagnosis, plan and need for follow up with the patient.    Discharge Medication List as of 4/16/2021 10:27 PM          Final diagnoses:   Sickle cell pain crisis (H)       --  I, Len Fernandes, am serving as a trained medical scribe to document services personally performed by Jarrell Santiago MD, based on the provider's statements to me.     IJarrell MD, was physically present and have reviewed and verified the accuracy of this note documented by Len Fernandes.    Jarrell Santiago MD  [facility] EMERGENCY DEPARTMENT  4/16/2021     Jarrell Santiago MD  04/22/21 1051

## 2021-04-18 VITALS
DIASTOLIC BLOOD PRESSURE: 88 MMHG | TEMPERATURE: 98 F | SYSTOLIC BLOOD PRESSURE: 139 MMHG | HEART RATE: 103 BPM | RESPIRATION RATE: 16 BRPM | OXYGEN SATURATION: 93 %

## 2021-04-18 LAB
ALBUMIN SERPL-MCNC: 3.8 G/DL (ref 3.4–5)
ALP SERPL-CCNC: 76 U/L (ref 40–150)
ALT SERPL W P-5'-P-CCNC: 107 U/L (ref 0–50)
ANION GAP SERPL CALCULATED.3IONS-SCNC: 5 MMOL/L (ref 3–14)
AST SERPL W P-5'-P-CCNC: 106 U/L (ref 0–45)
BILIRUB SERPL-MCNC: 2.8 MG/DL (ref 0.2–1.3)
BUN SERPL-MCNC: 14 MG/DL (ref 7–30)
CALCIUM SERPL-MCNC: 8.8 MG/DL (ref 8.5–10.1)
CHLORIDE SERPL-SCNC: 107 MMOL/L (ref 94–109)
CO2 SERPL-SCNC: 24 MMOL/L (ref 20–32)
CREAT SERPL-MCNC: 0.6 MG/DL (ref 0.52–1.04)
GFR SERPL CREATININE-BSD FRML MDRD: >90 ML/MIN/{1.73_M2}
GLUCOSE SERPL-MCNC: 87 MG/DL (ref 70–99)
LABORATORY COMMENT REPORT: NORMAL
POTASSIUM SERPL-SCNC: 4 MMOL/L (ref 3.4–5.3)
PROT SERPL-MCNC: 7.9 G/DL (ref 6.8–8.8)
SARS-COV-2 RNA RESP QL NAA+PROBE: NEGATIVE
SODIUM SERPL-SCNC: 136 MMOL/L (ref 133–144)
SPECIMEN SOURCE: NORMAL
TROPONIN I SERPL-MCNC: <0.015 UG/L (ref 0–0.04)

## 2021-04-18 PROCEDURE — 250N000011 HC RX IP 250 OP 636: Performed by: STUDENT IN AN ORGANIZED HEALTH CARE EDUCATION/TRAINING PROGRAM

## 2021-04-18 PROCEDURE — 258N000003 HC RX IP 258 OP 636: Performed by: STUDENT IN AN ORGANIZED HEALTH CARE EDUCATION/TRAINING PROGRAM

## 2021-04-18 RX ORDER — MORPHINE SULFATE 2 MG/ML
2 INJECTION, SOLUTION INTRAMUSCULAR; INTRAVENOUS EVERY 30 MIN PRN
Status: DISCONTINUED | OUTPATIENT
Start: 2021-04-18 | End: 2021-04-18 | Stop reason: HOSPADM

## 2021-04-18 RX ORDER — HEPARIN SODIUM (PORCINE) LOCK FLUSH IV SOLN 100 UNIT/ML 100 UNIT/ML
5 SOLUTION INTRAVENOUS
Status: DISCONTINUED | OUTPATIENT
Start: 2021-04-18 | End: 2021-04-18 | Stop reason: HOSPADM

## 2021-04-18 RX ADMIN — SODIUM CHLORIDE, POTASSIUM CHLORIDE, SODIUM LACTATE AND CALCIUM CHLORIDE 1000 ML: 600; 310; 30; 20 INJECTION, SOLUTION INTRAVENOUS at 00:06

## 2021-04-18 RX ADMIN — Medication 5 ML: at 02:28

## 2021-04-18 RX ADMIN — MORPHINE SULFATE 2 MG: 2 INJECTION, SOLUTION INTRAMUSCULAR; INTRAVENOUS at 00:02

## 2021-04-18 RX ADMIN — MORPHINE SULFATE 2 MG: 2 INJECTION, SOLUTION INTRAMUSCULAR; INTRAVENOUS at 01:12

## 2021-04-18 RX ADMIN — MORPHINE SULFATE 2 MG: 2 INJECTION, SOLUTION INTRAMUSCULAR; INTRAVENOUS at 01:53

## 2021-04-18 ASSESSMENT — ENCOUNTER SYMPTOMS
ARTHRALGIAS: 1
MYALGIAS: 1
SHORTNESS OF BREATH: 0

## 2021-04-18 NOTE — ED PROVIDER NOTES
[facility] EMERGENCY DEPARTMENT ([facility])  4/17/21   ED 5  11:31 PM   History     Chief Complaint   Patient presents with     Sickle Cell Pain Crisis     Chest Pain     The history is provided by the patient and medical records.     Jennifer Cervantes is a 21 year old female with history of CVA (left MCA with right hemiparesis and right upper extremity contracture) who presents with sickle cell pain flare pain that started tonight. She states symptoms started with  She states she was just resting when she developed sickle cell pain in chest this afternoon. She took her home pain medications oxycodone, OxyContin, Celebrex, ibuprofen and Tylenol and took a nap but woke up with persistent pain and so presents to the Emergency Department. The pain is sharp, striking, Pain is fixed in her chest and diffuse over her body. No change in elimination or stooling, has been eating well. No nausea or vomiting. No difficulty or changes with stooling or voiding. Patient states she has been eating normally. No nausea or vomiting. Pain is a 9/10. She also took a nebulizer without improvement.  She last had sickle cell pain admission last month, was inpatient from 3/4-3/16/21.  She also had hypoxic respiratory failure at that time.  During that hospitalization she was managed with IV fluids, morphine PCA and O2.  She was changed to p.o. on discharge.  She was seen in the ED yesterday for sickle cell pain flare.  Patient has a port in left chest in place.     Past Medical History  Past Medical History:   Diagnosis Date     Anemia      Anxiety      Bleeding disorder (H)      Blood clotting disorder (H)      Cerebral infarction (H) 2015     Cognitive developmental delay     low IQ. 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 (H)     right hand contractures     Iron overload due to repeated red blood  cell transfusions      Migraines      Multiple subsegmental pulmonary emboli without acute cor pulmonale (H) 02/01/2021     Oppositional defiant behavior      Superficial venous thrombosis of arm, right 03/25/2021     Uncomplicated asthma      Past Surgical History:   Procedure Laterality Date     AS INSERT TUNNELED CV 2 CATH W/O PORT/PUMP       C BREAST REDUCTION (INCLUDES LIPO) TIER 3 Bilateral 04/23/2019     CHOLECYSTECTOMY       IR CVC NON TUNNEL PLACEMENT  04/07/2020     REPAIR TENDON ELBOW Right 10/02/2019    Procedure: Right Elbow Flexor Lengthening, Flexor Pronator Slide Of Wrist and Finger, Thumb Adductor Lengthening;  Surgeon: Anai Franco MD;  [facility]     TONSILLECTOMY Bilateral 10/02/2019    Procedure: Bilateral Tonsillectomy;  Surgeon: Farhana Guy MD;  [facility]: [facility] OR     acetaminophen (TYLENOL) 325 MG tablet  albuterol (PROAIR HFA/PROVENTIL HFA/VENTOLIN HFA) 108 (90 Base) MCG/ACT inhaler  albuterol (PROVENTIL) (2.5 MG/3ML) 0.083% neb solution  [START ON 4/24/2021] apixaban ANTICOAGULANT (ELIQUIS) 5 MG tablet  Apixaban Starter Pack (ELIQUIS DVT/PE STARTER PACK) 5 MG TBPK  ARIPiprazole (ABILIFY) 2 MG tablet  aspirin (ASPIRIN) 81 MG EC tablet  budesonide-formoterol (SYMBICORT) 160-4.5 MCG/ACT Inhaler  celecoxib (CELEBREX) 100 MG capsule  diclofenac (VOLTAREN) 1 % topical gel  diphenhydrAMINE (BENADRYL) 25 MG capsule  EPINEPHrine (ANY BX GENERIC EQUIV) 0.3 MG/0.3ML injection 2-pack  Hydroxyurea 1000 MG TABS  JADENU 360 MG tablet  medroxyPROGESTERone (DEPO-PROVERA) 150 MG/ML IM injection  naloxone (NARCAN) 4 MG/0.1ML nasal spray  ondansetron (ZOFRAN) 8 MG tablet  oxyCODONE (OXYCONTIN) 10 MG 12 hr tablet  oxyCODONE IR (ROXICODONE) 15 MG tablet  sertraline (ZOLOFT) 100 MG tablet      Allergies   Allergen Reactions     Fish-Derived Products Hives     Seafood Hives     Contrast Dye      Diagnostic X-Ray Materials      Gadolinium      Family History  Family History   Problem  Relation Age of Onset     Sickle Cell Trait Mother      Hypertension Mother      Asthma Mother      Sickle Cell Trait Father      Social History   Social History     Tobacco Use     Smoking status: Never Smoker     Smokeless tobacco: Never Used   Substance Use Topics     Alcohol use: Not Currently     Alcohol/week: 0.0 standard drinks     Drug use: Never      Past medical history, past surgical history, medications, allergies, family history, and social history were reviewed with the patient. No additional pertinent items.       Review of Systems   Respiratory: Negative for shortness of breath.    Cardiovascular: Positive for chest pain.   Musculoskeletal: Positive for arthralgias and myalgias.     A complete review of systems was performed with pertinent positives and negatives noted in the HPI, and all other systems negative.    Physical Exam   BP: (!) 145/70  Pulse: 98  Temp: 98  F (36.7  C)  Resp: 18  SpO2: 95 %  Physical Exam  General: no acute distress. Appears stated age.   HENT: MMM, no oropharyngeal lesions  Eyes: PERRL, normal sclerae  Neck: non-tender, supple  Cardio: reg rate. Regular rhythm. Extremities well perfused  Resp: Normal work of breathing, normal respiratory rate.  Chest/Back: no visual signs of trauma, no CVA tenderness  Abdomen: no tenderness, non-distended, no rebound, no guarding  Neuro: alert and fully oriented. CN II-XII grossly intact. Grossly normal strength and sensation in all extremities.   MSK: no deformities. Grossly normal ROM in extremities.   Integumentary/Skin: no rash visualized, normal color  Psych: normal affect, normal behavior    ED Course       Results for orders placed or performed during the hospital encounter of 04/17/21   XR Chest 2 Views     Status: None    Narrative    EXAM: XR CHEST 2 VW  LOCATION: [facility]  DATE/TIME: 4/17/2021 11:29 PM    INDICATION: Chest pain.  COMPARISON: 4/16/2021.      Impression    IMPRESSION: Left-sided Port-A-Cath in  place with tip over the SVC. Normal heart size and pulmonary vascularity. Lungs are clear. No significant bony abnormalities. Chest is otherwise negative. No acute findings.   Comprehensive metabolic panel     Status: Abnormal   Result Value Ref Range    Sodium 136 133 - 144 mmol/L    Potassium 4.0 3.4 - 5.3 mmol/L    Chloride 107 94 - 109 mmol/L    Carbon Dioxide 24 20 - 32 mmol/L    Anion Gap 5 3 - 14 mmol/L    Glucose 87 70 - 99 mg/dL    Urea Nitrogen 14 7 - 30 mg/dL    Creatinine 0.60 0.52 - 1.04 mg/dL    GFR Estimate >90 >60 mL/min/[1.73_m2]    GFR Estimate If Black >90 >60 mL/min/[1.73_m2]    Calcium 8.8 8.5 - 10.1 mg/dL    Bilirubin Total 2.8 (H) 0.2 - 1.3 mg/dL    Albumin 3.8 3.4 - 5.0 g/dL    Protein Total 7.9 6.8 - 8.8 g/dL    Alkaline Phosphatase 76 40 - 150 U/L     (H) 0 - 50 U/L     (H) 0 - 45 U/L   CBC with platelets differential     Status: Abnormal   Result Value Ref Range    WBC 12.1 (H) 4.0 - 11.0 10e9/L    RBC Count 2.42 (L) 3.8 - 5.2 10e12/L    Hemoglobin 7.8 (L) 11.7 - 15.7 g/dL    Hematocrit 22.4 (L) 35.0 - 47.0 %    MCV 93 78 - 100 fl    MCH 32.2 26.5 - 33.0 pg    MCHC 34.8 31.5 - 36.5 g/dL    RDW 23.2 (H) 10.0 - 15.0 %    Platelet Count 376 150 - 450 10e9/L    Diff Method Automated Method     % Neutrophils 54.9 %    % Lymphocytes 28.0 %    % Monocytes 6.3 %    % Eosinophils 8.3 %    % Basophils 1.8 %    % Immature Granulocytes 0.7 %    Nucleated RBCs 3 (H) 0 /100    Absolute Neutrophil 6.6 1.6 - 8.3 10e9/L    Absolute Lymphocytes 3.4 0.8 - 5.3 10e9/L    Absolute Monocytes 0.8 0.0 - 1.3 10e9/L    Absolute Eosinophils 1.0 (H) 0.0 - 0.7 10e9/L    Absolute Basophils 0.2 0.0 - 0.2 10e9/L    Abs Immature Granulocytes 0.1 0 - 0.4 10e9/L    Absolute Nucleated RBC 0.3    Troponin I     Status: None   Result Value Ref Range    Troponin I ES <0.015 0.000 - 0.045 ug/L     Medications   lactated ringers infusion (has no administration in time range)   morphine (PF) injection 2 mg (2 mg  Intravenous Given 4/18/21 0153)   lactated ringers BOLUS 1,000 mL (0 mLs Intravenous Stopped 4/18/21 0153)        Assessments & Plan (with Medical Decision Making)   This is a 21-year-old female with a history of sickle cell disease who presents today with sickle cell pain crisis.  She has had full body pain today as well as chest pain.  She appears well and has normal vital signs and has a clear chest x-ray and a negative troponin and EKG that is unremarkable.  I do not think she has acute chest syndrome.  Her symptoms are consistent with sickle cell pain crisis and she was given per her care plan morphine 3 doses IV and a liter of lactated Ringer's.  After these interventions patient improved and able to discharge home.    I have reviewed the nursing notes. I have reviewed the findings, diagnosis, plan and need for follow up with the patient.    Impression:  Final diagnoses:   Sickle cell pain crisis (H)       --  Jered CID [facility] EMERGENCY DEPARTMENT  4/17/2021     Jered Michael MD  04/18/21 020

## 2021-04-18 NOTE — DISCHARGE INSTRUCTIONS
You were seen for sickle cell pain. You received medication to help your pain. Your labs look good today. If you have worsening pain, come back to the emergency department, otherwise follow up with your primary doctor.

## 2021-04-18 NOTE — ED TRIAGE NOTES
Arrives ambulatory to triage c/o sickle cell pain crisis. Been having chest pain as well, sharp pain. Describes as intermittent. States this pain is sometimes associated with her pain crises. A&Ox4, calm, cooperative in triage. Vitally stable on room air.

## 2021-04-19 ENCOUNTER — TELEPHONE (OUTPATIENT)
Dept: ONCOLOGY | Facility: CLINIC | Age: 21
End: 2021-04-19

## 2021-04-19 ENCOUNTER — INFUSION THERAPY VISIT (OUTPATIENT)
Dept: ONCOLOGY | Facility: CLINIC | Age: 21
End: 2021-04-19
Attending: PEDIATRICS
Payer: COMMERCIAL

## 2021-04-19 ENCOUNTER — PATIENT OUTREACH (OUTPATIENT)
Dept: CARE COORDINATION | Facility: CLINIC | Age: 21
End: 2021-04-19

## 2021-04-19 VITALS
OXYGEN SATURATION: 96 % | DIASTOLIC BLOOD PRESSURE: 82 MMHG | HEART RATE: 95 BPM | TEMPERATURE: 98.4 F | SYSTOLIC BLOOD PRESSURE: 123 MMHG | RESPIRATION RATE: 16 BRPM

## 2021-04-19 DIAGNOSIS — D57.00 SICKLE CELL PAIN CRISIS (H): Primary | ICD-10-CM

## 2021-04-19 LAB — INTERPRETATION ECG - MUSE: NORMAL

## 2021-04-19 PROCEDURE — 96361 HYDRATE IV INFUSION ADD-ON: CPT

## 2021-04-19 PROCEDURE — 96376 TX/PRO/DX INJ SAME DRUG ADON: CPT

## 2021-04-19 PROCEDURE — 96374 THER/PROPH/DIAG INJ IV PUSH: CPT

## 2021-04-19 PROCEDURE — 258N000003 HC RX IP 258 OP 636: Performed by: PEDIATRICS

## 2021-04-19 PROCEDURE — 250N000011 HC RX IP 250 OP 636: Performed by: PEDIATRICS

## 2021-04-19 RX ORDER — ONDANSETRON 8 MG/1
8 TABLET, FILM COATED ORAL
Status: CANCELLED
Start: 2021-04-19

## 2021-04-19 RX ORDER — HEPARIN SODIUM (PORCINE) LOCK FLUSH IV SOLN 100 UNIT/ML 100 UNIT/ML
5 SOLUTION INTRAVENOUS
Status: DISCONTINUED | OUTPATIENT
Start: 2021-04-19 | End: 2021-04-19 | Stop reason: HOSPADM

## 2021-04-19 RX ORDER — HEPARIN SODIUM,PORCINE 10 UNIT/ML
5 VIAL (ML) INTRAVENOUS
Status: CANCELLED | OUTPATIENT
Start: 2021-04-19

## 2021-04-19 RX ORDER — MORPHINE SULFATE 2 MG/ML
2 INJECTION, SOLUTION INTRAMUSCULAR; INTRAVENOUS
Status: CANCELLED
Start: 2021-04-19

## 2021-04-19 RX ORDER — DIPHENHYDRAMINE HCL 25 MG
25 CAPSULE ORAL
Status: DISCONTINUED | OUTPATIENT
Start: 2021-04-19 | End: 2021-04-19 | Stop reason: HOSPADM

## 2021-04-19 RX ORDER — MORPHINE SULFATE 2 MG/ML
2 INJECTION, SOLUTION INTRAMUSCULAR; INTRAVENOUS
Status: COMPLETED | OUTPATIENT
Start: 2021-04-19 | End: 2021-04-19

## 2021-04-19 RX ORDER — ONDANSETRON 8 MG/1
8 TABLET, FILM COATED ORAL
Status: DISCONTINUED | OUTPATIENT
Start: 2021-04-19 | End: 2021-04-19 | Stop reason: CLARIF

## 2021-04-19 RX ORDER — ONDANSETRON 8 MG/1
8 TABLET, ORALLY DISINTEGRATING ORAL ONCE
Status: DISCONTINUED | OUTPATIENT
Start: 2021-04-19 | End: 2021-04-19 | Stop reason: HOSPADM

## 2021-04-19 RX ORDER — DIPHENHYDRAMINE HCL 25 MG
25 CAPSULE ORAL
Status: CANCELLED
Start: 2021-04-19

## 2021-04-19 RX ORDER — HEPARIN SODIUM (PORCINE) LOCK FLUSH IV SOLN 100 UNIT/ML 100 UNIT/ML
5 SOLUTION INTRAVENOUS
Status: CANCELLED | OUTPATIENT
Start: 2021-04-19

## 2021-04-19 RX ADMIN — MORPHINE SULFATE 2 MG: 2 INJECTION, SOLUTION INTRAMUSCULAR; INTRAVENOUS at 14:36

## 2021-04-19 RX ADMIN — MORPHINE SULFATE 2 MG: 2 INJECTION, SOLUTION INTRAMUSCULAR; INTRAVENOUS at 15:31

## 2021-04-19 RX ADMIN — DEXTROSE AND SODIUM CHLORIDE 500 ML: 5; 450 INJECTION, SOLUTION INTRAVENOUS at 14:36

## 2021-04-19 RX ADMIN — Medication 5 ML: at 16:45

## 2021-04-19 RX ADMIN — MORPHINE SULFATE 2 MG: 2 INJECTION, SOLUTION INTRAMUSCULAR; INTRAVENOUS at 16:38

## 2021-04-19 NOTE — PROGRESS NOTES
Social Work Follow-Up  [facility]    Data/Intervention:  Patient Name:  Jennifer Cervantes  /Age:  2000 (21 year old)    Reason for Follow-Up:  Transportation     Collaborated With:    -pt  [facility]    Intervention/Education/Resources Provided:  SW received call from triage RN asking to assist pt with transportation for appointment at 2 pm today. SW called pt to verify if they are still at the [facility], which they confirmed they are. Stated they are doing well, SW set up transportation through pt's insurance and call pt back to inform them of their ride details. Pt verbalized that they have the phone number of transportation plus to call after appointment.    Assessment/Plan:  SW will remain available as needed.  Previously provided patient/family with writer's contact information and availability.       CARLOS Chavez,LGSW  Hematology/Oncology Social Worker  Phone:[phone] Pager: [phone]

## 2021-04-19 NOTE — TELEPHONE ENCOUNTER
[facility] Triage Telephone Call    Called by patient to discuss if we have heard about infusion time.  She can be here in 10-15 minutes.  She will need a ride and I have spoken with social work to assist and they will contact her with ride details.      Patient confirms 2 pm infusion appointment.    Kelsey Santiago RN BSN, HNBC, STAR-T

## 2021-04-19 NOTE — PROGRESS NOTES
This is a recent snapshot of the patient's [facility] medical record.  For current drug dose and complete information and questions, call [phone] or In Basket pool, [facility]v home infusion ([id number])  CSN Number:  [id number]

## 2021-04-19 NOTE — TELEPHONE ENCOUNTER
[facility] Telephone Triage Note    The following symptoms were reported:    Typical  Description:            Onset:  Last night   Location: general  Character: Dull ache all over body           Intensity: 8/10    Accompanying Signs & Symptoms:  none    Chest Pain:  denies     Shortness of Breath:  denies     Fever:  denies     Chills:  denies   Cough/sore throat:  denies  Other:  Denies other symptoms.     Therapies Tried and outcome: Last took Oxycodone, Tylenol, celebrex, and oxycontin around 8:00am this morning.      Improved by: warm shower only helped a little    Travel time 10-15 minutes if needed or transportation arranged.     The following provider was consulted:  11:18am per Dr. Duncan, approved for  IVF/Pain if openings today. No labs needed. If unable to get int today, have pt retry tomorrow.       Patient instructions and/or follow up:    Instructed patient to seek care immediately for worsening symptoms, including: fever, chest pain, shortness of breath, dizziness.      Patient scheduled for infusion this afternoon at 2pm.

## 2021-04-19 NOTE — PATIENT INSTRUCTIONS
[facility] Triage and after hours / weekends / holidays:  [phone]    Please call the triage or after hours line if you experience a temperature greater than or equal to 100.5, shaking chills, have uncontrolled nausea, vomiting and/or diarrhea, dizziness, shortness of breath, chest pain, bleeding, unexplained bruising, or if you have any other new/concerning symptoms, questions or concerns.      If you are having any concerning symptoms or wish to speak to a provider before your next infusion visit, please call your care coordinator or triage to notify them so we can adequately serve you.     If you need a refill on a narcotic prescription or other medication, please call before your infusion appointment.                 April 2021 Sunday Monday Tuesday Wednesday Thursday Friday Saturday                       1    P BMT INFUSION 120  12:00 PM   (120 min.)   UC BMT INFUSION   [facility] Blood and Marrow Transplant Program [place] 2    VIDEO VISIT RETURN  10:05 AM   (50 min.)   Andrei Machado PA   [facility]    LAB PERIPHERAL   1:00 PM   (15 min.)   [facility] DRAW   [facility] ONC INFUSION 120   1:30 PM   (120 min.)   UC ONCOLOGY INFUSION   [facility] 3    Admission   6:42 PM   Som Connor MD   [facility] Emergency Department   (Discharge: 4/3/2021)   4     5    P SPEC INFUSION 120   2:00 PM   (120 min.)   UC SPEC INFUSION   [facility] Advanced Treatment [facility] 6    LAB PERIPHERAL  11:30 AM   (15 min.)   [facility] DRAW   [facility] ONC INFUSION 120  12:00 PM   (120 min.)   UC ONCOLOGY INFUSION   [facility] 7    Admission   8:30 PM   Mykel Luz DO   [facility] Emergency Department   (Discharge: 4/8/2021)    XR CHEST PORT 1 VIEW  10:00 PM   (20 min.)    [email or web address]   [facility] 8     9    VIDEO VISIT RETURN   8:45 AM   (50 min.)   Andrei Machado PA   [facility] 10       11     12    LAB PERIPHERAL  12:30 PM   (15 min.)    [facility] DRAW   [facility]    RETURN  12:45 PM   (50 min.)   Andrei Machado PA   [facility]    UMP ONC INFUSION 120   1:00 PM   (120 min.)   UC ONCOLOGY INFUSION   [facility] 13     14    UMP ONC INFUSION 120  12:30 PM   (120 min.)   UC ONCOLOGY INFUSION   [facility] 15    UMP ONC INFUSION 120   1:00 PM   (120 min.)   UC ONCOLOGY INFUSION   [facility] 16    RETURN  12:45 PM   (30 min.)   Eric Duncan MD   [facility]    Admission   8:04 PM   [facility] Emergency Department   (Discharge: 4/16/2021)    XR CHEST PORT 1 VIEW   8:25 PM   (20 min.)   [id number]   [facility] 17    PRE-PROCEDURE COVID PCR   2:30 PM   (15 min.)   UR COVID LAB   [facility]    Admission  10:56 PM   [facility] Emergency Department   (Discharge: 4/18/2021)    XR CHEST 2 VIEWS  11:25 PM   (15 min.)   [email or web address]   [facility]   18     19    UMP ONC INFUSION 120   2:00 PM   (120 min.)   UC ONCOLOGY INFUSION   [facility] 20     21    IR CHEST PORT PLACEMENT >5 YRS   7:00 AM   (90 min.)   [id number]   [facility] Imaging [place]    REMOVAL, VASCULAR ACCESS PORT   8:30 AM   Suraj Awad MD   [facility] [place]    Outpatient Visit   8:30 AM   [facility] OR [place] 22     23     24       25     26     27    UMP RETURN   9:15 AM   (30 min.)   Suraj Case MD   [facility] Internal Medicine [place] 28     29     30    LAB CENTRAL  12:30 PM   (15 min.)   [place]  DRAW   [facility]    RETURN  12:45 PM   (50 min.)   Cornelia Suresh PA-C   [facility]                  May 2021      [place] Monday Tuesday Wednesday Thursday Friday Saturday                                 1       2     3     4     5     6     7     8       9     10     11     12     13     14     15       16     17     18     19     20     21     22       23     24     25     26     27     28     29       30     31                                           No results found for this or any previous visit (from the past 24 hour(s)).

## 2021-04-19 NOTE — PROGRESS NOTES
Infusion Nursing Note:  Jennifer Cervantes presents today for IVF and pain medications.    Patient seen by provider today: No   present during visit today: Not Applicable.    Note: pt assessed upon arrival to infusion clinic. Pt denies fever, chills, SOB, or cough. No other changes or concerns since she last saw Dr. Duncan on 4/16/21  Reporting 8/10 generalized pain.  Received Morphine x 3 to bring her pain down to 6/10.     Intravenous Access:  Implanted Port.    Treatment Conditions:  Not Applicable.      Post Infusion Assessment:  Patient tolerated infusion without incident.  Blood return noted pre and post infusion.  Site patent and intact, free from redness, edema or discomfort.  No evidence of extravasations.  Access discontinued per protocol.       Discharge Plan:   Patient declined prescription refills.  Discharge instructions reviewed with: Patient.  Patient and/or family verbalized understanding of discharge instructions and all questions answered.  Copy of AVS reviewed with patient and/or family.  Patient will return 4/21/21 for new port placement.  Patient discharged in stable condition accompanied by: self.  Departure Mode: Ambulatory.    Claudia Batista RN

## 2021-04-20 ENCOUNTER — INFUSION THERAPY VISIT (OUTPATIENT)
Dept: ONCOLOGY | Facility: CLINIC | Age: 21
End: 2021-04-20
Attending: PEDIATRICS
Payer: COMMERCIAL

## 2021-04-20 ENCOUNTER — PATIENT OUTREACH (OUTPATIENT)
Dept: ONCOLOGY | Facility: CLINIC | Age: 21
End: 2021-04-20

## 2021-04-20 ENCOUNTER — MEDICAL CORRESPONDENCE (OUTPATIENT)
Dept: HEALTH INFORMATION MANAGEMENT | Facility: CLINIC | Age: 21
End: 2021-04-20

## 2021-04-20 ENCOUNTER — PATIENT OUTREACH (OUTPATIENT)
Dept: CARE COORDINATION | Facility: CLINIC | Age: 21
End: 2021-04-20

## 2021-04-20 ENCOUNTER — DOCUMENTATION ONLY (OUTPATIENT)
Dept: INTERVENTIONAL RADIOLOGY/VASCULAR | Facility: CLINIC | Age: 21
End: 2021-04-20

## 2021-04-20 ENCOUNTER — ANESTHESIA EVENT (OUTPATIENT)
Dept: SURGERY | Facility: AMBULATORY SURGERY CENTER | Age: 21
End: 2021-04-20

## 2021-04-20 VITALS
HEART RATE: 108 BPM | DIASTOLIC BLOOD PRESSURE: 88 MMHG | RESPIRATION RATE: 18 BRPM | TEMPERATURE: 98 F | OXYGEN SATURATION: 94 % | SYSTOLIC BLOOD PRESSURE: 133 MMHG

## 2021-04-20 DIAGNOSIS — D57.00 SICKLE CELL PAIN CRISIS (H): Primary | ICD-10-CM

## 2021-04-20 PROCEDURE — 96374 THER/PROPH/DIAG INJ IV PUSH: CPT

## 2021-04-20 PROCEDURE — 96376 TX/PRO/DX INJ SAME DRUG ADON: CPT

## 2021-04-20 PROCEDURE — 96361 HYDRATE IV INFUSION ADD-ON: CPT

## 2021-04-20 PROCEDURE — 258N000003 HC RX IP 258 OP 636: Performed by: PEDIATRICS

## 2021-04-20 PROCEDURE — 250N000011 HC RX IP 250 OP 636: Performed by: PEDIATRICS

## 2021-04-20 RX ORDER — DIPHENHYDRAMINE HCL 25 MG
25 CAPSULE ORAL
Status: DISCONTINUED | OUTPATIENT
Start: 2021-04-20 | End: 2021-04-20 | Stop reason: HOSPADM

## 2021-04-20 RX ORDER — HEPARIN SODIUM (PORCINE) LOCK FLUSH IV SOLN 100 UNIT/ML 100 UNIT/ML
5 SOLUTION INTRAVENOUS
Status: CANCELLED | OUTPATIENT
Start: 2021-05-01

## 2021-04-20 RX ORDER — HEPARIN SODIUM,PORCINE 10 UNIT/ML
5 VIAL (ML) INTRAVENOUS
Status: CANCELLED | OUTPATIENT
Start: 2021-04-20

## 2021-04-20 RX ORDER — HEPARIN SODIUM (PORCINE) LOCK FLUSH IV SOLN 100 UNIT/ML 100 UNIT/ML
5 SOLUTION INTRAVENOUS
Status: CANCELLED | OUTPATIENT
Start: 2021-04-20

## 2021-04-20 RX ORDER — DIPHENHYDRAMINE HCL 25 MG
25 CAPSULE ORAL
Status: CANCELLED
Start: 2021-04-20

## 2021-04-20 RX ORDER — HEPARIN SODIUM (PORCINE) LOCK FLUSH IV SOLN 100 UNIT/ML 100 UNIT/ML
5 SOLUTION INTRAVENOUS
Status: DISCONTINUED | OUTPATIENT
Start: 2021-04-20 | End: 2021-04-20 | Stop reason: HOSPADM

## 2021-04-20 RX ORDER — MORPHINE SULFATE 2 MG/ML
2 INJECTION, SOLUTION INTRAMUSCULAR; INTRAVENOUS
Status: CANCELLED
Start: 2021-04-20

## 2021-04-20 RX ORDER — HEPARIN SODIUM,PORCINE 10 UNIT/ML
5 VIAL (ML) INTRAVENOUS
Status: CANCELLED | OUTPATIENT
Start: 2021-05-01

## 2021-04-20 RX ORDER — ONDANSETRON 8 MG/1
8 TABLET, FILM COATED ORAL
Status: DISCONTINUED | OUTPATIENT
Start: 2021-04-20 | End: 2021-04-20

## 2021-04-20 RX ORDER — ONDANSETRON 8 MG/1
8 TABLET, FILM COATED ORAL
Status: CANCELLED
Start: 2021-04-20

## 2021-04-20 RX ORDER — ONDANSETRON 8 MG/1
8 TABLET, ORALLY DISINTEGRATING ORAL
Status: DISCONTINUED | OUTPATIENT
Start: 2021-04-20 | End: 2021-04-20 | Stop reason: HOSPADM

## 2021-04-20 RX ORDER — MORPHINE SULFATE 2 MG/ML
2 INJECTION, SOLUTION INTRAMUSCULAR; INTRAVENOUS
Status: COMPLETED | OUTPATIENT
Start: 2021-04-20 | End: 2021-04-20

## 2021-04-20 RX ADMIN — Medication 5 ML: at 11:13

## 2021-04-20 RX ADMIN — MORPHINE SULFATE 2 MG: 2 INJECTION, SOLUTION INTRAMUSCULAR; INTRAVENOUS at 08:56

## 2021-04-20 RX ADMIN — MORPHINE SULFATE 2 MG: 2 INJECTION, SOLUTION INTRAMUSCULAR; INTRAVENOUS at 10:51

## 2021-04-20 RX ADMIN — MORPHINE SULFATE 2 MG: 2 INJECTION, SOLUTION INTRAMUSCULAR; INTRAVENOUS at 09:55

## 2021-04-20 RX ADMIN — DEXTROSE AND SODIUM CHLORIDE 500 ML: 5; 450 INJECTION, SOLUTION INTRAVENOUS at 08:54

## 2021-04-20 NOTE — PROGRESS NOTES
Social Work Follow-Up  [facility]    Data/Intervention:  Patient Name:  Jennifer Cervantes  /Age:  2000 (21 year old)    Reason for Follow-Up:  Transportation    Collaborated With:    -pt    Intervention/Education/Resources Provided:  SW received a phone call from JOE Landa stating that pt is set up for an 8:30 am appointment today and wondering if transportation can be arranged. SW explained that it is too short of a notice to call pt's insurance for ride set up, but SW can use a taxi voucher. SW used a taxi voucher and pt's ride has been set up through [employer] with a will call return ride. SW called pt and informed them of their ride details, pt verbalized understanding and thanked SW for their assistance.      Assessment/Plan:  SW will remain available as needed.  Previously provided patient/family with writer's contact information and availability.       CARLOS Chavez,NATALIA  Hematology/Oncology Social Worker  Phone:[phone] Pager: [phone]

## 2021-04-20 NOTE — PROGRESS NOTES
Interventional Radiology  04/20/21  2:35 PM      Spoke with Andrei Vasquez over the phone.     Confirmed that patient had a pediatric port placed at [facility] and now would like removal of existing port and placement of a single lumen port power.    Micehlle Johnson PA-C

## 2021-04-20 NOTE — PATIENT INSTRUCTIONS
[facility] Triage and after hours / weekends / holidays:  [phone]    Please call the triage or after hours line if you experience a temperature greater than or equal to 100.5, shaking chills, have uncontrolled nausea, vomiting and/or diarrhea, dizziness, shortness of breath, chest pain, bleeding, unexplained bruising, or if you have any other new/concerning symptoms, questions or concerns.      If you are having any concerning symptoms or wish to speak to a provider before your next infusion visit, please call your care coordinator or triage to notify them so we can adequately serve you.     If you need a refill on a narcotic prescription or other medication, please call before your infusion appointment.             April 2021 Sunday Monday Tuesday Wednesday Thursday Friday Saturday                       1    P BMT INFUSION 120  12:00 PM   (120 min.)   UC BMT INFUSION   [facility] Blood and Marrow Transplant Program [place] 2    VIDEO VISIT RETURN  10:05 AM   (50 min.)   Andrei Machado PA   [facility]    LAB PERIPHERAL   1:00 PM   (15 min.)    [facility] DRAW   [facility] ONC INFUSION 120   1:30 PM   (120 min.)   UC ONCOLOGY INFUSION   [facility] 3    Admission   6:42 PM   Som Connor MD   [facility] Emergency Department   (Discharge: 4/3/2021)   4     5    P SPEC INFUSION 120   2:00 PM   (120 min.)   UC SPEC INFUSION   [facility] Advanced Treatment [facility] 6    LAB PERIPHERAL  11:30 AM   (15 min.)   [facility] DRAW   [facility] ONC INFUSION 120  12:00 PM   (120 min.)   UC ONCOLOGY INFUSION   [facility] 7    Admission   8:30 PM   Mykel Luz DO   [facility] Emergency Department   (Discharge: 4/8/2021)    XR CHEST PORT 1 VIEW  10:00 PM   (20 min.)   [email or web address]     [facility] 8     9    VIDEO VISIT RETURN   8:45 AM   (50 min.)   Andrei Machado PA   [facility] 10       11     12    LAB PERIPHERAL  12:30 PM   (15 min.)   [facility] DRAW   [facility]    RETURN  12:45 PM   (50 min.)   Andrei Machado PA   [facility]    UMP ONC INFUSION 120   1:00 PM   (120 min.)   UC ONCOLOGY INFUSION   [facility] 13     14    UMP ONC INFUSION 120  12:30 PM   (120 min.)   UC ONCOLOGY INFUSION   [facility] 15    UMP ONC INFUSION 120   1:00 PM   (120 min.)   UC ONCOLOGY INFUSION   [facility] 16    RETURN  12:45 PM   (30 min.)   Eric Duncan MD   [facility]    Admission   8:04 PM   [facility] Emergency Department   (Discharge: 4/16/2021)    XR CHEST PORT 1 VIEW   8:25 PM   (20 min.)   [id number]   [facility] Imaging 17    PRE-PROCEDURE COVID PCR   2:30 PM   (15 min.)   UR COVID LAB   [facility] [place] [facility]    Admission  10:56 PM   [facility] Emergency Department   (Discharge: 4/18/2021)    XR CHEST 2 VIEWS  11:25 PM   (15 min.)   [email or web address]   [facility] Imaging   18     19    UMP ONC INFUSION 120   2:00 PM   (120 min.)   UC ONCOLOGY INFUSION   [facility] 20    UMP ONC INFUSION 120   8:30 AM   (120 min.)   UC ONCOLOGY INFUSION   [facility] 21    IR CHEST PORT PLACEMENT >5 YRS   7:00 AM   (90 min.)   [id number]   [facility] Imaging [place]    REMOVAL, VASCULAR ACCESS PORT   8:30 AM   Suraj Awad MD   [facility] OR    Outpatient Visit   8:30 AM   [facility] 22     23     24       25     26     27    UMP RETURN   9:15 AM   (30 min.)   Suraj Case MD   [facility] Internal  [facility] 28     29     30    LAB CENTRAL  12:30 PM   (15 min.)   [facility] DRAW   [facility]    RETURN  12:45 PM   (50 min.)   Cornelia Suresh PA-C   [facility]                  May 2021      [place] Monday Tuesday Wednesday Thursday Friday Saturday                                 1       2     3     4     5     6     7     8       9     10     11     12     13     14     15       16     17     18     19     20     21     22       23     24     25     26     27     28     29       30     31                                           No results found for this or any previous visit (from the past 24 hour(s)).

## 2021-04-20 NOTE — PROGRESS NOTES
Infusion Nursing Note:  Jennifer Cervantes presents today for IV fluids and pain meds.    Patient seen by provider today: No   present during visit today: Not Applicable.    Note: Patient arrived to infusion reporting 9/10 generalized pain.  She was here for infusion yesterday and states her pain improved but worsened again after she got home. Patient states she is taking all of her pain medications at home, using heat, and pushing fluids without relief.  Patient received morphine x3 and pain improved to 5/10.  Patient stated that she was comfortable discharging home with that level of pain.      Intravenous Access:  Implanted Port accessed in infusion    Treatment Conditions:  Not Applicable.      Post Infusion Assessment:  Patient tolerated infusion without incident.  Blood return noted pre and post infusion.  Site patent and intact, free from redness, edema or discomfort.  No evidence of extravasations.  Access discontinued per protocol.       Discharge Plan:   Patient declined prescription refills.  Discharge instructions reviewed with: Patient.  Patient and/or family verbalized understanding of discharge instructions and all questions answered.  Copy of AVS reviewed with patient and/or family.  Patient will return 4/30 for next appointment.  Patient discharged in stable condition accompanied by: self.  Departure Mode: Ambulatory.  Face to Face time: 0.    Melissa Oliver RN

## 2021-04-20 NOTE — PROGRESS NOTES
Jennifer calls to see if infusion has capacity to see her today. It is generalized, all over, and while her home meds work for a short period of time it wears off pretty quickly.    No fever or shortness of breath, eating and drinking fine. No other symptoms. 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 Ok'd by Dr. Duncan.

## 2021-04-20 NOTE — PROGRESS NOTES
Infusion ok'd 830am, social work kindly setting up transportation, request sent to scheduling.    Syeda Ulrich RN

## 2021-04-21 ENCOUNTER — TELEPHONE (OUTPATIENT)
Dept: ONCOLOGY | Facility: CLINIC | Age: 21
End: 2021-04-21

## 2021-04-21 ENCOUNTER — HOSPITAL ENCOUNTER (OUTPATIENT)
Facility: AMBULATORY SURGERY CENTER | Age: 21
Discharge: HOME | End: 2021-04-21
Attending: RADIOLOGY | Admitting: RADIOLOGY
Payer: COMMERCIAL

## 2021-04-21 ENCOUNTER — ANCILLARY PROCEDURE (OUTPATIENT)
Dept: RADIOLOGY | Facility: AMBULATORY SURGERY CENTER | Age: 21
End: 2021-04-21
Attending: PHYSICIAN ASSISTANT
Payer: COMMERCIAL

## 2021-04-21 ENCOUNTER — ANESTHESIA (OUTPATIENT)
Dept: SURGERY | Facility: AMBULATORY SURGERY CENTER | Age: 21
End: 2021-04-21

## 2021-04-21 VITALS
WEIGHT: 168.21 LBS | DIASTOLIC BLOOD PRESSURE: 72 MMHG | BODY MASS INDEX: 28.72 KG/M2 | HEIGHT: 64 IN | OXYGEN SATURATION: 96 % | SYSTOLIC BLOOD PRESSURE: 123 MMHG | TEMPERATURE: 97 F | RESPIRATION RATE: 16 BRPM | HEART RATE: 99 BPM

## 2021-04-21 DIAGNOSIS — D57.1 HB-SS DISEASE WITHOUT CRISIS (H): ICD-10-CM

## 2021-04-21 LAB
B-HCG SERPL-ACNC: <1 IU/L (ref 0–5)
HCG UR QL: NEGATIVE
INTERNAL QC OK POCT: YES

## 2021-04-21 PROCEDURE — 36561 INSERT TUNNELED CV CATH: CPT | Performed by: RADIOLOGY

## 2021-04-21 PROCEDURE — 93005 ELECTROCARDIOGRAM TRACING: CPT

## 2021-04-21 PROCEDURE — 99285 EMERGENCY DEPT VISIT HI MDM: CPT | Mod: 25 | Performed by: EMERGENCY MEDICINE

## 2021-04-21 PROCEDURE — 99285 EMERGENCY DEPT VISIT HI MDM: CPT | Mod: 25

## 2021-04-21 PROCEDURE — 81025 URINE PREGNANCY TEST: CPT | Performed by: PATHOLOGY

## 2021-04-21 PROCEDURE — 36590 REMOVAL TUNNELED CV CATH: CPT | Performed by: RADIOLOGY

## 2021-04-21 PROCEDURE — 999N000127 HC STATISTIC PERIPHERAL IV START W US GUIDANCE

## 2021-04-21 PROCEDURE — 93010 ELECTROCARDIOGRAM REPORT: CPT | Performed by: EMERGENCY MEDICINE

## 2021-04-21 PROCEDURE — 76937 US GUIDE VASCULAR ACCESS: CPT | Mod: 26 | Performed by: RADIOLOGY

## 2021-04-21 PROCEDURE — 77001 FLUOROGUIDE FOR VEIN DEVICE: CPT | Mod: 26 | Performed by: RADIOLOGY

## 2021-04-21 DEVICE — CATH PORT POWERPORT CLEARVUE ISP 8FR 5608062: Type: IMPLANTABLE DEVICE | Site: CHEST | Status: FUNCTIONAL

## 2021-04-21 RX ORDER — PROPOFOL 10 MG/ML
INJECTION, EMULSION INTRAVENOUS PRN
Status: DISCONTINUED | OUTPATIENT
Start: 2021-04-21 | End: 2021-04-21

## 2021-04-21 RX ORDER — GABAPENTIN 300 MG/1
300 CAPSULE ORAL ONCE
Status: DISCONTINUED | OUTPATIENT
Start: 2021-04-21 | End: 2021-04-22 | Stop reason: HOSPADM

## 2021-04-21 RX ORDER — ACETAMINOPHEN 325 MG/1
975 TABLET ORAL ONCE
Status: COMPLETED | OUTPATIENT
Start: 2021-04-21 | End: 2021-04-21

## 2021-04-21 RX ORDER — LIDOCAINE 40 MG/G
CREAM TOPICAL
Status: DISCONTINUED | OUTPATIENT
Start: 2021-04-21 | End: 2021-04-22 | Stop reason: HOSPADM

## 2021-04-21 RX ORDER — ONDANSETRON 2 MG/ML
4 INJECTION INTRAMUSCULAR; INTRAVENOUS EVERY 30 MIN PRN
Status: DISCONTINUED | OUTPATIENT
Start: 2021-04-21 | End: 2021-04-22 | Stop reason: HOSPADM

## 2021-04-21 RX ORDER — SODIUM CHLORIDE, SODIUM LACTATE, POTASSIUM CHLORIDE, CALCIUM CHLORIDE 600; 310; 30; 20 MG/100ML; MG/100ML; MG/100ML; MG/100ML
INJECTION, SOLUTION INTRAVENOUS CONTINUOUS
Status: DISCONTINUED | OUTPATIENT
Start: 2021-04-21 | End: 2021-04-22 | Stop reason: HOSPADM

## 2021-04-21 RX ORDER — FENTANYL CITRATE 50 UG/ML
25-50 INJECTION, SOLUTION INTRAMUSCULAR; INTRAVENOUS
Status: DISCONTINUED | OUTPATIENT
Start: 2021-04-21 | End: 2021-04-22 | Stop reason: HOSPADM

## 2021-04-21 RX ORDER — NALOXONE HYDROCHLORIDE 0.4 MG/ML
0.2 INJECTION, SOLUTION INTRAMUSCULAR; INTRAVENOUS; SUBCUTANEOUS
Status: DISCONTINUED | OUTPATIENT
Start: 2021-04-21 | End: 2021-04-22 | Stop reason: HOSPADM

## 2021-04-21 RX ORDER — CEFAZOLIN SODIUM 2 G/50ML
2 SOLUTION INTRAVENOUS
Status: COMPLETED | OUTPATIENT
Start: 2021-04-21 | End: 2021-04-21

## 2021-04-21 RX ORDER — ALBUTEROL SULFATE 0.83 MG/ML
2.5 SOLUTION RESPIRATORY (INHALATION) EVERY 4 HOURS PRN
Status: DISCONTINUED | OUTPATIENT
Start: 2021-04-21 | End: 2021-04-22 | Stop reason: HOSPADM

## 2021-04-21 RX ORDER — HEPARIN SODIUM (PORCINE) LOCK FLUSH IV SOLN 100 UNIT/ML 100 UNIT/ML
5 SOLUTION INTRAVENOUS
Status: DISCONTINUED | OUTPATIENT
Start: 2021-04-21 | End: 2021-04-22 | Stop reason: HOSPADM

## 2021-04-21 RX ORDER — LIDOCAINE HYDROCHLORIDE 20 MG/ML
INJECTION, SOLUTION INFILTRATION; PERINEURAL PRN
Status: DISCONTINUED | OUTPATIENT
Start: 2021-04-21 | End: 2021-04-21

## 2021-04-21 RX ORDER — NALOXONE HYDROCHLORIDE 0.4 MG/ML
0.4 INJECTION, SOLUTION INTRAMUSCULAR; INTRAVENOUS; SUBCUTANEOUS
Status: DISCONTINUED | OUTPATIENT
Start: 2021-04-21 | End: 2021-04-22 | Stop reason: HOSPADM

## 2021-04-21 RX ORDER — ONDANSETRON 4 MG/1
4 TABLET, ORALLY DISINTEGRATING ORAL EVERY 30 MIN PRN
Status: DISCONTINUED | OUTPATIENT
Start: 2021-04-21 | End: 2021-04-22 | Stop reason: HOSPADM

## 2021-04-21 RX ORDER — OXYCODONE HYDROCHLORIDE 5 MG/1
5 TABLET ORAL EVERY 4 HOURS PRN
Status: DISCONTINUED | OUTPATIENT
Start: 2021-04-21 | End: 2021-04-22 | Stop reason: HOSPADM

## 2021-04-21 RX ORDER — PROPOFOL 10 MG/ML
INJECTION, EMULSION INTRAVENOUS CONTINUOUS PRN
Status: DISCONTINUED | OUTPATIENT
Start: 2021-04-21 | End: 2021-04-21

## 2021-04-21 RX ORDER — HEPARIN SODIUM,PORCINE 10 UNIT/ML
5 VIAL (ML) INTRAVENOUS EVERY 24 HOURS
Status: DISCONTINUED | OUTPATIENT
Start: 2021-04-21 | End: 2021-04-22 | Stop reason: HOSPADM

## 2021-04-21 RX ADMIN — PROPOFOL 30 MG: 10 INJECTION, EMULSION INTRAVENOUS at 10:11

## 2021-04-21 RX ADMIN — SODIUM CHLORIDE, SODIUM LACTATE, POTASSIUM CHLORIDE, CALCIUM CHLORIDE: 600; 310; 30; 20 INJECTION, SOLUTION INTRAVENOUS at 08:39

## 2021-04-21 RX ADMIN — PROPOFOL 40 MG: 10 INJECTION, EMULSION INTRAVENOUS at 10:24

## 2021-04-21 RX ADMIN — PROPOFOL 50 MG: 10 INJECTION, EMULSION INTRAVENOUS at 09:06

## 2021-04-21 RX ADMIN — PROPOFOL 50 MG: 10 INJECTION, EMULSION INTRAVENOUS at 10:34

## 2021-04-21 RX ADMIN — LIDOCAINE HYDROCHLORIDE 100 MG: 20 INJECTION, SOLUTION INFILTRATION; PERINEURAL at 08:43

## 2021-04-21 RX ADMIN — OXYCODONE HYDROCHLORIDE 5 MG: 5 TABLET ORAL at 11:26

## 2021-04-21 RX ADMIN — PROPOFOL 30 MG: 10 INJECTION, EMULSION INTRAVENOUS at 09:07

## 2021-04-21 RX ADMIN — CEFAZOLIN SODIUM 2 G: 2 SOLUTION INTRAVENOUS at 08:50

## 2021-04-21 RX ADMIN — PROPOFOL 50 MG: 10 INJECTION, EMULSION INTRAVENOUS at 10:38

## 2021-04-21 RX ADMIN — PROPOFOL 200 MCG/KG/MIN: 10 INJECTION, EMULSION INTRAVENOUS at 08:43

## 2021-04-21 RX ADMIN — PROPOFOL 50 MG: 10 INJECTION, EMULSION INTRAVENOUS at 10:15

## 2021-04-21 RX ADMIN — ACETAMINOPHEN 975 MG: 325 TABLET ORAL at 07:32

## 2021-04-21 ASSESSMENT — MIFFLIN-ST. JEOR: SCORE: 1508.25

## 2021-04-21 NOTE — ANESTHESIA CARE TRANSFER NOTE
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    Anesthesia Type:   MAC     Note:    Oropharynx: oropharynx clear of all foreign objects and spontaneously breathing  Level of Consciousness: drowsy  Oxygen Supplementation: room air    Independent Airway: airway patency satisfactory and stable  Dentition: dentition unchanged  Vital Signs Stable: post-procedure vital signs reviewed and stable  Report to RN Given: handoff report given  Patient transferred to: Phase II    Handoff Report: Identifed the Patient, Identified the Reponsible Provider, Reviewed the pertinent medical history, Discussed the surgical course, Reviewed Intra-OP anesthesia mangement and issues during anesthesia, Set expectations for post-procedure period and Allowed opportunity for questions and acknowledgement of understanding      Vitals: (Last set prior to Anesthesia Care Transfer)  CRNA VITALS  4/21/2021 1024 - 4/21/2021 1100      4/21/2021             Pulse:  106    SpO2:  100 %    Resp Rate (set):  10        Electronically Signed By: EITAN Brantley CRNA  April 21, 2021  11:00 AM

## 2021-04-21 NOTE — TELEPHONE ENCOUNTER
Pt called in to triage asking if she could be considered for ivf pain meds related to pain from her port replacement today. State pain is 9/10 at her incision site, port was L side chest. She denied any sob, new fevers, chills, cough or sickle cell related pain. Stated she was given oral tylenol and acetaminophen prior to discharge but unsure if any iv pain meds were given. She does have oxycontin 10 mg q12h and oxycodone 15 mg q6h prn at home for pain along with scheduled celebrex.     Per MAR today pt was given lidocaine and propofol during procedure, oral tylenol 975 mg prior and oxy 5 mg after procedure. Informed her to apply ice packs to port site for 20 min take off for 20-30 min and reapply for comfort. Take home medications for pain but if she feels pain is unmanageable by evening time then call back to speak with on-call or come to ED. Call in the morning for ivf pain meds if she feels pain is still more then she can manage on home meds, pt verbalized understanding and requested RNCC call her back today for follow-up. Message given to RNCC.

## 2021-04-21 NOTE — DISCHARGE INSTRUCTIONS
A collaboration between [facility] and [facility]  Experts in minimally invasive, targeted treatments performed using imaging guidance    Venous Access Device,  Port Catheter or Tunneled or Non-Tunneled Central Line Placement    Today you had a procedure today to install a venous access device; either a tunneled central vein catheter or a subcutaneous port catheter.    After you go home:  - Drink plenty of fluids.  Generally 6-8 (8 ounce) glasses a day is recommended.  - Resume your regular diet unless otherwise ordered by a medical provider.  - Keep any applied tape/gauze dressings clean and dry.  Change tape/gauze dressings if they get wet or soiled.  - You may shower the following day after procedure, however cover and protect from moisture any tape/gauze dressings.  You may let water hit and run over dried skin glue, but do not scrub.  Pat the area dry after showering.  - Port placement incisions are closed with absorbable suture, meaning they do not need to be removed at a later date, and a topical skin adhesive (skin glue).  This glue will wear off in 7-14 days.  Do not remove before this time.  If 14 days have passed and residual glue is present, you may gently remove it.  - Do not apply gels, lotions, or ointments to the glue site for the first 10 days as this may cause the glue to prematurely soften and fail.  - Do not perform strenuous activities or lift greater than 10 pounds for the next three days.  - If there is bleeding or oozing from the procedure site, apply firm pressure to the area for 5-10 minutes.  If the bleeding continues seek medical advice at the numbers below.  - Mild procedure site discomfort can be treated with an ice pack and over-the-counter pain relievers.        For 24 hours after any sedation used:  - Relax and take it easy.  No strenuous activities.  - Do not drive or operate machines at home or at work.  - No alcohol  consumption.  - Do not make any important or legal decisions.    Call our Interventional Radiology (IR) service if:  - If you start bleeding from the procedure site.  If you do start to bleed from the site, lie down and hold some pressure on the site.  Our radiology provider can help you decide if you need to return to the hospital.  - If you have new or worsening pain related to the procedure.  - If you have concerning swelling at the procedure site.  - If you develop persistent nausea or vomiting.  - If you develop hives or a rash or any unexplained itching.  - If you have a fever of greater than 100.5  F and chills in the first 5 days after procedure.  - Any other concerns related to your procedure.      [facility]  Interventional Radiology (IR)  [address] [place] [address]    Contact Number:  [phone]  (IR control desk)  - Monday - Friday 8:00 am - 4:30 pm    After hours for urgent concerns:  [phone]  - After 4:30 pm Monday - Friday, Weekends and Holidays.   - Ask for Interventional Radiology on-call.  Someone is available 24 hours a day.  - [facility] toll free number:  [phone]

## 2021-04-21 NOTE — PROCEDURES
Interventional Radiology Brief Post Procedure Note    Procedure:    1.  Left Port Removal  2.  Left Port Placement    Proceduralist: Rajan More MD    Assistant: Cesar Lyn PA-C and Michelle Johnson PA-C    Time Out: Prior to the start of the procedure and with procedural staff participation, I verbally confirmed the patient s identity using two indicators, relevant allergies, that the procedure was appropriate and matched the consent or emergent situation, and that the correct equipment/implants were available. Immediately prior to starting the procedure I conducted the Time Out with the procedural staff and re-confirmed the patient s name, procedure, and site/side. (The Joint Commission universal protocol was followed.)  Yes    Sedation: Monitored Anesthesia Care (MAC) administered and documented by Anesthesia Care Provider    Findings: Attempted right EJ access but cannot cross central veins.  Completed placement of 8FR 25.5 cm single lumen port power placement via left internal jugular with tip in the right atrium.  Completed removal of left chest port.      Estimated Blood Loss: Less than 10 ml    Fluoroscopy Time:  19.8 minutes    SPECIMENS: None    Complications: 1. None     Condition: Stable    Plan: transported to recovery area.  Port is hep locked and ready for immediate use.      Comments: See dictated procedure note for full details.    Michelle Johnson PA-C

## 2021-04-21 NOTE — ANESTHESIA PREPROCEDURE EVALUATION
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 (H)     right hand contractures     Iron overload due to repeated red blood cell transfusions      Migraines      Multiple subsegmental pulmonary emboli without acute cor pulmonale (H) 2021     Oppositional defiant behavior      Superficial venous thrombosis of arm, right 2021     Uncomplicated asthma       Past Surgical History:   Procedure Laterality Date     AS INSERT TUNNELED CV 2 CATH W/O PORT/PUMP       C BREAST REDUCTION (INCLUDES LIPO) TIER 3 Bilateral 2019     CHOLECYSTECTOMY       IR CVC NON TUNNEL PLACEMENT  2020     REPAIR TENDON ELBOW Right 10/02/2019    Procedure: Right Elbow Flexor Lengthening, Flexor Pronator Slide Of Wrist and Finger, Thumb Adductor Lengthening;  Surgeon: Anai Franco MD;  [facility]     TONSILLECTOMY Bilateral 10/02/2019    Procedure: Bilateral Tonsillectomy;  Surgeon: Farhana Guy MD;  Location: [facility]      Allergies   Allergen Reactions     Fish-Derived Products Hives     Seafood Hives     Contrast Dye      Diagnostic X-Ray Materials      Gadolinium       Social History     Tobacco Use     Smoking status: Never Smoker     Smokeless tobacco: Never Used   Substance Use Topics     Alcohol use: Not Currently     Alcohol/week: 0.0 standard drinks      Wt Readings from  Last 1 Encounters:   04/16/21 76.3 kg (168 lb 3.4 oz)        Anesthesia Evaluation   Pt has had prior anesthetic.     No history of anesthetic complications       ROS/MED HX  ENT/Pulmonary:     (+) Moderate Persistent, asthma     Neurologic:     (+) migraines, CVA, date: 22 months of age, with deficits, - Spastic right hemiparesis.     Cardiovascular:     (+) -----Previous cardiac testing (Cardiac MRI 3/2021 - LVEF 55%, RVEF 47%, normal valves)     METS/Exercise Tolerance:     Hematologic: Comments: Sickle cell disease    (+) History of blood clots (Multiple subsegmental PE 2/2021), pt is anticoagulated, anemia, history of blood transfusion,     Musculoskeletal:  - neg musculoskeletal ROS     GI/Hepatic:  - neg GI/hepatic ROS     Renal/Genitourinary:  - neg Renal ROS     Endo:  - neg endo ROS     Psychiatric/Substance Use:     (+) psychiatric history anxiety and depression     Infectious Disease:  - neg infectious disease ROS     Malignancy:  - neg malignancy ROS     Other:      (+) , H/O Chronic Pain,        Physical Exam    Airway  airway exam normal           Respiratory Devices and Support         Dental  no notable dental history         Cardiovascular   cardiovascular exam normal          Pulmonary   pulmonary exam normal                OUTSIDE LABS:  CBC:   Lab Results   Component Value Date    WBC 12.1 (H) 04/17/2021    WBC 10.4 04/16/2021    HGB 7.8 (L) 04/17/2021    HGB 8.2 (L) 04/16/2021    HCT 22.4 (L) 04/17/2021    HCT 23.2 (L) 04/16/2021     04/17/2021     04/16/2021     BMP:   Lab Results   Component Value Date     04/17/2021     04/16/2021    POTASSIUM 4.0 04/17/2021    POTASSIUM 4.0 04/16/2021    CHLORIDE 107 04/17/2021    CHLORIDE 106 04/16/2021    CO2 24 04/17/2021    CO2 22 04/16/2021    BUN 14 04/17/2021    BUN 13 04/16/2021    CR 0.60 04/17/2021    CR 0.60 04/16/2021    GLC 87 04/17/2021    GLC 91 04/16/2021     COAGS:   Lab Results   Component Value Date      (HH) 04/03/2021    INR 1.28 (H) 04/03/2021     POC:   Lab Results   Component Value Date     (H) 12/10/2020    HCG Negative 03/20/2021    HCGS Negative 04/03/2021     HEPATIC:   Lab Results   Component Value Date    ALBUMIN 3.8 04/17/2021    PROTTOTAL 7.9 04/17/2021     (H) 04/17/2021     (H) 04/17/2021    ALKPHOS 76 04/17/2021    BILITOTAL 2.8 (H) 04/17/2021     OTHER:   Lab Results   Component Value Date    LACT 0.7 02/21/2021    MICAH 8.8 04/17/2021    PHOS 3.6 02/21/2021    MAG 1.7 02/21/2021    CRP 8.8 (H) 04/03/2021       Anesthesia Plan    ASA Status:  3   NPO Status:  NPO Appropriate    Anesthesia Type: MAC.     - Reason for MAC: chronic cardiopulmonary disease, straight local not clinically adequate   Induction: Intravenous.   Maintenance: TIVA.        Consents    Anesthesia Plan(s) and associated risks, benefits, and realistic alternatives discussed. Questions answered and patient/representative(s) expressed understanding.     - Discussed with:  Patient      - Specific Concerns: Awareness/recall, major complications.        Postoperative Care    Pain management: IV analgesics, Oral pain medications, Multi-modal analgesia.   PONV prophylaxis: Ondansetron (or other 5HT-3), Background Propofol Infusion     Comments:                Kenneth Mancia MD

## 2021-04-21 NOTE — ANESTHESIA POSTPROCEDURE EVALUATION
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    Anesthesia Type:  MAC    Note:  Disposition: Outpatient   Postop Pain Control: Uneventful            Sign Out: Well controlled pain   PONV: No   Neuro/Psych: Uneventful            Sign Out: Acceptable/Baseline neuro status   Airway/Respiratory: Uneventful            Sign Out: Acceptable/Baseline resp. status   CV/Hemodynamics: Uneventful            Sign Out: Acceptable CV status; No obvious hypovolemia; No obvious fluid overload   Other NRE: NONE   DID A NON-ROUTINE EVENT OCCUR? No           Last vitals:  Vitals:    04/21/21 1058 04/21/21 1115 04/21/21 1130   BP: 124/80 123/71 123/72   Pulse: 101 99    Resp: 14 15 16   Temp: 36.1  C (97  F) 36.1  C (97  F) 36.1  C (97  F)   SpO2: 95% 96% 96%       Last vitals prior to Anesthesia Care Transfer:  CRNA VITALS  4/21/2021 1024 - 4/21/2021 1124      4/21/2021             Pulse:  106    SpO2:  100 %    Resp Rate (set):  10          Electronically Signed By: Kenneth Mancia MD  April 21, 2021  5:08 PM

## 2021-04-22 ENCOUNTER — APPOINTMENT (OUTPATIENT)
Dept: GENERAL RADIOLOGY | Facility: CLINIC | Age: 21
End: 2021-04-22
Attending: EMERGENCY MEDICINE
Payer: COMMERCIAL

## 2021-04-22 ENCOUNTER — PATIENT OUTREACH (OUTPATIENT)
Dept: ONCOLOGY | Facility: CLINIC | Age: 21
End: 2021-04-22

## 2021-04-22 ENCOUNTER — HOSPITAL ENCOUNTER (EMERGENCY)
Facility: CLINIC | Age: 21
Discharge: HOME | End: 2021-04-22
Attending: EMERGENCY MEDICINE | Admitting: EMERGENCY MEDICINE
Payer: COMMERCIAL

## 2021-04-22 ENCOUNTER — TELEPHONE (OUTPATIENT)
Dept: ONCOLOGY | Facility: CLINIC | Age: 21
End: 2021-04-22

## 2021-04-22 VITALS
TEMPERATURE: 98.7 F | OXYGEN SATURATION: 92 % | HEART RATE: 113 BPM | SYSTOLIC BLOOD PRESSURE: 119 MMHG | BODY MASS INDEX: 28.82 KG/M2 | WEIGHT: 168 LBS | RESPIRATION RATE: 20 BRPM | DIASTOLIC BLOOD PRESSURE: 76 MMHG

## 2021-04-22 DIAGNOSIS — R00.0 TACHYCARDIA, UNSPECIFIED: ICD-10-CM

## 2021-04-22 DIAGNOSIS — D57.00 SICKLE CELL PAIN CRISIS (H): ICD-10-CM

## 2021-04-22 LAB
ALBUMIN SERPL-MCNC: 3.7 G/DL (ref 3.4–5)
ALP SERPL-CCNC: 84 U/L (ref 40–150)
ALT SERPL W P-5'-P-CCNC: 144 U/L (ref 0–50)
ANION GAP SERPL CALCULATED.3IONS-SCNC: 6 MMOL/L (ref 3–14)
AST SERPL W P-5'-P-CCNC: 145 U/L (ref 0–45)
BASOPHILS # BLD AUTO: 0.2 10E9/L (ref 0–0.2)
BASOPHILS NFR BLD AUTO: 1.2 %
BILIRUB SERPL-MCNC: 3.3 MG/DL (ref 0.2–1.3)
BUN SERPL-MCNC: 9 MG/DL (ref 7–30)
CALCIUM SERPL-MCNC: 8.4 MG/DL (ref 8.5–10.1)
CHLORIDE SERPL-SCNC: 110 MMOL/L (ref 94–109)
CO2 SERPL-SCNC: 22 MMOL/L (ref 20–32)
CREAT SERPL-MCNC: 0.59 MG/DL (ref 0.52–1.04)
DIFFERENTIAL METHOD BLD: ABNORMAL
EOSINOPHIL # BLD AUTO: 1.1 10E9/L (ref 0–0.7)
EOSINOPHIL NFR BLD AUTO: 8.2 %
ERYTHROCYTE [DISTWIDTH] IN BLOOD BY AUTOMATED COUNT: 23.9 % (ref 10–15)
GFR SERPL CREATININE-BSD FRML MDRD: >90 ML/MIN/{1.73_M2}
GLUCOSE SERPL-MCNC: 105 MG/DL (ref 70–99)
HCG SERPL QL: NEGATIVE
HCT VFR BLD AUTO: 21.5 % (ref 35–47)
HGB BLD-MCNC: 7.9 G/DL (ref 11.7–15.7)
IMM GRANULOCYTES # BLD: 0.1 10E9/L (ref 0–0.4)
IMM GRANULOCYTES NFR BLD: 0.7 %
INTERPRETATION ECG - MUSE: NORMAL
LYMPHOCYTES # BLD AUTO: 2.9 10E9/L (ref 0.8–5.3)
LYMPHOCYTES NFR BLD AUTO: 21.2 %
MCH RBC QN AUTO: 33.8 PG (ref 26.5–33)
MCHC RBC AUTO-ENTMCNC: 36.7 G/DL (ref 31.5–36.5)
MCV RBC AUTO: 92 FL (ref 78–100)
MONOCYTES # BLD AUTO: 0.9 10E9/L (ref 0–1.3)
MONOCYTES NFR BLD AUTO: 6.8 %
NEUTROPHILS # BLD AUTO: 8.5 10E9/L (ref 1.6–8.3)
NEUTROPHILS NFR BLD AUTO: 61.9 %
NRBC # BLD AUTO: 0.5 10*3/UL
NRBC BLD AUTO-RTO: 4 /100
PLATELET # BLD AUTO: 337 10E9/L (ref 150–450)
POTASSIUM SERPL-SCNC: 3.5 MMOL/L (ref 3.4–5.3)
PROT SERPL-MCNC: 7.8 G/DL (ref 6.8–8.8)
RBC # BLD AUTO: 2.34 10E12/L (ref 3.8–5.2)
RETICS # AUTO: 495.6 10E9/L (ref 25–95)
RETICS/RBC NFR AUTO: 21.2 % (ref 0.5–2)
SODIUM SERPL-SCNC: 138 MMOL/L (ref 133–144)
TROPONIN I SERPL-MCNC: <0.015 UG/L (ref 0–0.04)
WBC # BLD AUTO: 13.7 10E9/L (ref 4–11)

## 2021-04-22 PROCEDURE — 84703 CHORIONIC GONADOTROPIN ASSAY: CPT | Performed by: EMERGENCY MEDICINE

## 2021-04-22 PROCEDURE — 85045 AUTOMATED RETICULOCYTE COUNT: CPT | Performed by: EMERGENCY MEDICINE

## 2021-04-22 PROCEDURE — 71046 X-RAY EXAM CHEST 2 VIEWS: CPT

## 2021-04-22 PROCEDURE — 96361 HYDRATE IV INFUSION ADD-ON: CPT

## 2021-04-22 PROCEDURE — 84484 ASSAY OF TROPONIN QUANT: CPT | Performed by: EMERGENCY MEDICINE

## 2021-04-22 PROCEDURE — 80053 COMPREHEN METABOLIC PANEL: CPT | Performed by: EMERGENCY MEDICINE

## 2021-04-22 PROCEDURE — 258N000003 HC RX IP 258 OP 636: Performed by: EMERGENCY MEDICINE

## 2021-04-22 PROCEDURE — 85025 COMPLETE CBC W/AUTO DIFF WBC: CPT | Performed by: EMERGENCY MEDICINE

## 2021-04-22 PROCEDURE — 96374 THER/PROPH/DIAG INJ IV PUSH: CPT

## 2021-04-22 PROCEDURE — 71046 X-RAY EXAM CHEST 2 VIEWS: CPT | Mod: 26 | Performed by: RADIOLOGY

## 2021-04-22 PROCEDURE — 96376 TX/PRO/DX INJ SAME DRUG ADON: CPT

## 2021-04-22 PROCEDURE — 250N000011 HC RX IP 250 OP 636: Performed by: EMERGENCY MEDICINE

## 2021-04-22 RX ORDER — SODIUM CHLORIDE, SODIUM LACTATE, POTASSIUM CHLORIDE, CALCIUM CHLORIDE 600; 310; 30; 20 MG/100ML; MG/100ML; MG/100ML; MG/100ML
1000 INJECTION, SOLUTION INTRAVENOUS CONTINUOUS
Status: DISCONTINUED | OUTPATIENT
Start: 2021-04-22 | End: 2021-04-22 | Stop reason: HOSPADM

## 2021-04-22 RX ORDER — MORPHINE SULFATE 2 MG/ML
2 INJECTION, SOLUTION INTRAMUSCULAR; INTRAVENOUS
Status: COMPLETED | OUTPATIENT
Start: 2021-04-22 | End: 2021-04-22

## 2021-04-22 RX ADMIN — MORPHINE SULFATE 2 MG: 2 INJECTION, SOLUTION INTRAMUSCULAR; INTRAVENOUS at 03:31

## 2021-04-22 RX ADMIN — SODIUM CHLORIDE, POTASSIUM CHLORIDE, SODIUM LACTATE AND CALCIUM CHLORIDE 1000 ML: 600; 310; 30; 20 INJECTION, SOLUTION INTRAVENOUS at 01:36

## 2021-04-22 RX ADMIN — MORPHINE SULFATE 2 MG: 2 INJECTION, SOLUTION INTRAMUSCULAR; INTRAVENOUS at 02:21

## 2021-04-22 RX ADMIN — MORPHINE SULFATE 2 MG: 2 INJECTION, SOLUTION INTRAMUSCULAR; INTRAVENOUS at 01:37

## 2021-04-22 NOTE — ED TRIAGE NOTES
Pt presents with low back pain an generalized body aches that she attributes to a sickle cell pain crisis.  She had her 18th portacath placed today on her right chest wall and is having a lot of post op pain with that as well.

## 2021-04-22 NOTE — ED PROVIDER NOTES
[place] EMERGENCY DEPARTMENT ([facility])  4/22/21    History     Chief Complaint   Patient presents with     Sickle Cell Pain Crisis     The history is provided by the patient and medical records.     Jennifer Cervantes is a 21 year old female with a history of sickle cell disease, CVA, DVT who presents to the Emergency Department with sickle cell pain crisis. Patient reports she had a procedure yesterday to place a new portacath. She states when she was discharged and went home her sickle cell pain started flaring. Patient reports whole body pain in upper body, arms, and legs. She denies shortness of breath, chest pain, fevers, or cough. No swelling of joints. No nausea, vomiting, diarrhea, numbness, tingling, weakness, or headache. Patient notes pain around the surgical site as well but states her sickle cell pain is more severe.    Past Medical History  Past Medical History:   Diagnosis Date     Anemia      Anxiety      Bleeding disorder (H)      Blood clotting disorder (H)      Cerebral infarction (H) 2015     Cognitive developmental delay     low IQ. 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 (H)     right hand contractures     Iron overload due to repeated red blood cell transfusions      Migraines      Multiple subsegmental pulmonary emboli without acute cor pulmonale (H) 02/01/2021     Oppositional defiant behavior      Superficial venous thrombosis of arm, right 03/25/2021     Uncomplicated asthma      Past Surgical History:   Procedure Laterality Date     AS INSERT TUNNELED CV 2 CATH W/O PORT/PUMP       C BREAST REDUCTION (INCLUDES LIPO) TIER 3 Bilateral 04/23/2019     CHOLECYSTECTOMY       IR CVC NON TUNNEL PLACEMENT  04/07/2020     REPAIR TENDON ELBOW Right 10/02/2019    Procedure: Right Elbow Flexor Lengthening, Flexor Pronator Slide Of Wrist and Finger, Thumb  Adductor Lengthening;  Surgeon: Anai Franco MD;  [facility]     TONSILLECTOMY Bilateral 10/02/2019    Procedure: Bilateral Tonsillectomy;  Surgeon: Farhana Guy MD;  [facility]: [facility] OR     acetaminophen (TYLENOL) 325 MG tablet  albuterol (PROAIR HFA/PROVENTIL HFA/VENTOLIN HFA) 108 (90 Base) MCG/ACT inhaler  albuterol (PROVENTIL) (2.5 MG/3ML) 0.083% neb solution  [START ON 4/24/2021] apixaban ANTICOAGULANT (ELIQUIS) 5 MG tablet  Apixaban Starter Pack (ELIQUIS DVT/PE STARTER PACK) 5 MG TBPK  ARIPiprazole (ABILIFY) 2 MG tablet  aspirin (ASPIRIN) 81 MG EC tablet  budesonide-formoterol (SYMBICORT) 160-4.5 MCG/ACT Inhaler  celecoxib (CELEBREX) 100 MG capsule  diclofenac (VOLTAREN) 1 % topical gel  diphenhydrAMINE (BENADRYL) 25 MG capsule  EPINEPHrine (ANY BX GENERIC EQUIV) 0.3 MG/0.3ML injection 2-pack  Hydroxyurea 1000 MG TABS  JADENU 360 MG tablet  medroxyPROGESTERone (DEPO-PROVERA) 150 MG/ML IM injection  naloxone (NARCAN) 4 MG/0.1ML nasal spray  ondansetron (ZOFRAN) 8 MG tablet  oxyCODONE (OXYCONTIN) 10 MG 12 hr tablet  oxyCODONE IR (ROXICODONE) 15 MG tablet  sertraline (ZOLOFT) 100 MG tablet      Allergies   Allergen Reactions     Fish-Derived Products Hives     Seafood Hives     Contrast Dye      Diagnostic X-Ray Materials      Gadolinium      Family History  Family History   Problem Relation Age of Onset     Sickle Cell Trait Mother      Hypertension Mother      Asthma Mother      Sickle Cell Trait Father      Social History   Social History     Tobacco Use     Smoking status: Never Smoker     Smokeless tobacco: Never Used   Substance Use Topics     Alcohol use: Not Currently     Alcohol/week: 0.0 standard drinks     Drug use: Never      Past medical history, past surgical history, medications, allergies, family history, and social history were reviewed with the patient. No additional pertinent items.       Review of Systems   ROS: 14 point ROS neg other than the symptoms noted above in  the HPI.  A complete review of systems was performed with pertinent positives and negatives noted in the HPI, and all other systems negative.    Physical Exam   BP: 135/79  Pulse: 110  Temp: 98.7  F (37.1  C)  Resp: 20  Weight: 76.2 kg (168 lb)  SpO2: 93 %  Physical Exam  Physical Exam   Constitutional: oriented to person, place, and time. appears well-developed and well-nourished.   HENT:   Head: Normocephalic and atraumatic.   Neck: Normal range of motion.   Pulmonary/Chest: Effort normal. No respiratory distress. Clear lungs bilaterally.  Incision sites around port appear well-healing with no erythema, swelling, tenderness.  Cardiac: No murmurs, rubs, gallops. RRR.  Abdominal: Abdomen soft, nontender, nondistended. No rebound tenderness.  MSK: Long bones without deformity or evidence of trauma.  No lower extremity swelling.  No tenderness palpation of the calves, thighs, knees, ankles.  Range of motion of hips, ankles and knees are normal.  Able to move upper extremities without difficulty.  Neurological: alert and oriented to person, place, and time. Moving all extremities.  Gait intact.  Cranial nerves II through XII intact.  Skin: Skin is warm and dry.   Psychiatric:  normal mood and affect.  behavior is normal. Thought content normal.     ED Course   1:16 AM  The patient was seen and examined by Andrew Chou MD in Room ED16.      Procedures             EKG Interpretation:      Interpreted by Andrew Chou MD  Time reviewed: 0130  Symptoms at time of EKG: chest pain   Rhythm: sinus tachycardia  Rate: 102  Axis: normal  Ectopy: none  Conduction: normal  ST Segments/ T Waves: No ST-T wave changes  Q Waves: lead aVL  Comparison to prior: Unchanged    Clinical Impression: Sinus tachy, unchanged from prior             Results for orders placed or performed during the hospital encounter of 04/22/21   CBC with platelets differential     Status: Abnormal (In process)   Result Value Ref Range    WBC 13.7 (H) 4.0  - 11.0 10e9/L    RBC Count 2.34 (L) 3.8 - 5.2 10e12/L    Hemoglobin 7.9 (L) 11.7 - 15.7 g/dL    Hematocrit 21.5 (L) 35.0 - 47.0 %    MCV 92 78 - 100 fl    MCH 33.8 (H) 26.5 - 33.0 pg    MCHC 36.7 (H) 31.5 - 36.5 g/dL    RDW 23.9 (H) 10.0 - 15.0 %    Platelet Count 337 150 - 450 10e9/L    Diff Method PENDING    Comprehensive metabolic panel     Status: Abnormal   Result Value Ref Range    Sodium 138 133 - 144 mmol/L    Potassium 3.5 3.4 - 5.3 mmol/L    Chloride 110 (H) 94 - 109 mmol/L    Carbon Dioxide 22 20 - 32 mmol/L    Anion Gap 6 3 - 14 mmol/L    Glucose 105 (H) 70 - 99 mg/dL    Urea Nitrogen 9 7 - 30 mg/dL    Creatinine 0.59 0.52 - 1.04 mg/dL    GFR Estimate >90 >60 mL/min/[1.73_m2]    GFR Estimate If Black >90 >60 mL/min/[1.73_m2]    Calcium 8.4 (L) 8.5 - 10.1 mg/dL    Bilirubin Total 3.3 (H) 0.2 - 1.3 mg/dL    Albumin 3.7 3.4 - 5.0 g/dL    Protein Total 7.8 6.8 - 8.8 g/dL    Alkaline Phosphatase 84 40 - 150 U/L     (H) 0 - 50 U/L     (H) 0 - 45 U/L     Medications - No data to display     Assessments & Plan (with Medical Decision Making)   MDM  Patient presenting with sickle cell pain.  This is similar of her sickle cell pain crises.  Chest x-ray does not show pneumothorax.  There are no signs of infection around her port.  She has minimal tenderness around this area.  Do not suspect a complication regarding the recent placement.  Labs are stable for this patient.  Vitals here are reassuring.  No signs of acute chest clinically or on chest x-ray.  EKG is stable, troponin negative.  Very unlikely ACS.  She has no shortness of breath or chest pain, very unlikely pulmonary embolism.  Clinically she has no signs of infection.  Patient received her 3 doses of pain medications per her treatment plan.  Her pain is gone and she does feel ready to be discharged home.  She will follow up in infusion clinic.  She will return if worsening.    I have reviewed the nursing notes. I have reviewed the findings,  diagnosis, plan and need for follow up with the patient.    New Prescriptions    No medications on file       Final diagnoses:   Sickle cell pain crisis (H)     ISisi am serving as a trained medical scribe to document services personally performed by Andrew Chou MD, based on the provider's statements to me.      Andrew NEWMAN MD, was physically present and have reviewed and verified the accuracy of this note documented by Sisi Alejandra.     --  Andrew Chou MD  [facility] EMERGENCY DEPARTMENT  4/21/2021     Andrew Chou MD  04/22/21 0401

## 2021-04-22 NOTE — DISCHARGE INSTRUCTIONS
Follow-up with hematology or infusion clinic when you are able.    Return to the emergency department if you have worsening pain, fevers, shortness of breath or if you have any further concerns

## 2021-04-22 NOTE — PROGRESS NOTES
Writer placed call to Jennifer to see how she is doing. Had port removal and new port placed. She experienced increased pain yesterday and spoke with triage who advised attempting home rescue plan first. Jennifer agreed and tried this but was in ER early this morning for pain management. Discussed with Dr Duncan. If she were to call in for IVF/pain meds 4/22 or 4/23, ok for addition to infusion list without paging care team. Writer advised Jennifer of this via voicemail left on phone as she did not answer.

## 2021-04-22 NOTE — TELEPHONE ENCOUNTER
Pt called in to triage requesting IVF pain meds for all over pain rated 9/10 x2 days. Stated last took prn Oxy IR every 3 hours today without relief. Denied any fevers, chills, cough, sob, chest pain or other symptoms. Pt's last infusion was 4/20, last clinic visit 4/16 with follow-up on 4/30 with [facility]. Pt denied being out of home medications and needing any refills today.     Pt went to ED last night from 2353 and discharged at 0416 this morning, per RN[facility] note she left a vm for pt informing her Dr. Duncan approved ivf pain meds 4/22 and 4/23 if she called back but since its so late in the day she will have to wait for tomorrow. Pt added to infusion list and advised if pain unmanageable at home proceed to ED, otherwise will contact her tomorrow if any openings for ivf pain meds becomes available. She verbalized understanding.

## 2021-04-23 ENCOUNTER — HOME INFUSION (PRE-WILLOW HOME INFUSION) (OUTPATIENT)
Dept: PHARMACY | Facility: CLINIC | Age: 21
End: 2021-04-23

## 2021-04-23 ENCOUNTER — TELEPHONE (OUTPATIENT)
Dept: ONCOLOGY | Facility: CLINIC | Age: 21
End: 2021-04-23

## 2021-04-23 ENCOUNTER — PATIENT OUTREACH (OUTPATIENT)
Dept: ONCOLOGY | Facility: CLINIC | Age: 21
End: 2021-04-23

## 2021-04-23 ENCOUNTER — INFUSION THERAPY VISIT (OUTPATIENT)
Dept: ONCOLOGY | Facility: CLINIC | Age: 21
End: 2021-04-23
Attending: PEDIATRICS
Payer: COMMERCIAL

## 2021-04-23 VITALS
RESPIRATION RATE: 18 BRPM | SYSTOLIC BLOOD PRESSURE: 121 MMHG | HEART RATE: 116 BPM | DIASTOLIC BLOOD PRESSURE: 80 MMHG | OXYGEN SATURATION: 93 % | TEMPERATURE: 98.1 F

## 2021-04-23 DIAGNOSIS — D57.00 SICKLE CELL PAIN CRISIS (H): Primary | ICD-10-CM

## 2021-04-23 PROCEDURE — 96361 HYDRATE IV INFUSION ADD-ON: CPT

## 2021-04-23 PROCEDURE — 258N000003 HC RX IP 258 OP 636: Performed by: PEDIATRICS

## 2021-04-23 PROCEDURE — 96376 TX/PRO/DX INJ SAME DRUG ADON: CPT

## 2021-04-23 PROCEDURE — 96374 THER/PROPH/DIAG INJ IV PUSH: CPT

## 2021-04-23 PROCEDURE — 250N000011 HC RX IP 250 OP 636: Performed by: PEDIATRICS

## 2021-04-23 RX ORDER — MORPHINE SULFATE 2 MG/ML
2 INJECTION, SOLUTION INTRAMUSCULAR; INTRAVENOUS
Status: CANCELLED
Start: 2021-04-23

## 2021-04-23 RX ORDER — ONDANSETRON 8 MG/1
8 TABLET, FILM COATED ORAL
Status: CANCELLED
Start: 2021-04-23

## 2021-04-23 RX ORDER — MORPHINE SULFATE 2 MG/ML
2 INJECTION, SOLUTION INTRAMUSCULAR; INTRAVENOUS
Status: DISCONTINUED | OUTPATIENT
Start: 2021-04-23 | End: 2021-04-23 | Stop reason: HOSPADM

## 2021-04-23 RX ORDER — DIPHENHYDRAMINE HCL 25 MG
25 CAPSULE ORAL
Status: CANCELLED
Start: 2021-04-23

## 2021-04-23 RX ORDER — HEPARIN SODIUM (PORCINE) LOCK FLUSH IV SOLN 100 UNIT/ML 100 UNIT/ML
5 SOLUTION INTRAVENOUS
Status: DISCONTINUED | OUTPATIENT
Start: 2021-04-23 | End: 2021-04-23 | Stop reason: HOSPADM

## 2021-04-23 RX ORDER — HEPARIN SODIUM (PORCINE) LOCK FLUSH IV SOLN 100 UNIT/ML 100 UNIT/ML
5 SOLUTION INTRAVENOUS
Status: CANCELLED | OUTPATIENT
Start: 2021-04-23

## 2021-04-23 RX ORDER — HEPARIN SODIUM,PORCINE 10 UNIT/ML
5 VIAL (ML) INTRAVENOUS
Status: CANCELLED | OUTPATIENT
Start: 2021-04-23

## 2021-04-23 RX ADMIN — MORPHINE SULFATE 2 MG: 2 INJECTION, SOLUTION INTRAMUSCULAR; INTRAVENOUS at 12:43

## 2021-04-23 RX ADMIN — MORPHINE SULFATE 2 MG: 2 INJECTION, SOLUTION INTRAMUSCULAR; INTRAVENOUS at 14:01

## 2021-04-23 RX ADMIN — DEXTROSE AND SODIUM CHLORIDE 500 ML: 5; 450 INJECTION, SOLUTION INTRAVENOUS at 12:42

## 2021-04-23 RX ADMIN — MORPHINE SULFATE 2 MG: 2 INJECTION, SOLUTION INTRAMUSCULAR; INTRAVENOUS at 15:19

## 2021-04-23 RX ADMIN — Medication 5 ML: at 15:43

## 2021-04-23 NOTE — TELEPHONE ENCOUNTER
[facility] Telephone Triage Note    The following symptoms were reported:   Typical  Description:            Onset:  Last night around 8:00pm  Location: everywhere  Character: more sharp           Intensity: 10/10    Accompanying Signs & Symptoms:  none    Chest Pain:  denies     Shortness of Breath:  denies     Fever:  denies     Chills:  denies   Cough/sore throat:  denies  Other: denies    Therapies Tried and outcome:6:00am took oxycodone and oxycontin with tylenol and celebrex    Improved by: heat has also tried with minimal relief today    The following provider was consulted: Per plan documented yesterday 4/22 from Jul[facility]- Dr. Duncan approved IVF/Pain for today if openings available.     The following advice/orders were given, and/or interventions recommended:      IVF/Pain infusion appt available today at 12:30pm    Patient instructions and/or follow up:    Confirmed Jennifer Billy about available for appt.        Abdullahi notified and will arrange transportation and was able to get a hold of patient to confirm this.    Scheduling notified

## 2021-04-23 NOTE — PROGRESS NOTES
Social Work Follow-Up  [facility]    Data/Intervention:  Patient Name:  Jennifer Cervantes  /Age:  2000 (21 year old)    Reason for Follow-Up:  Transportation    Collaborated With:    -Jennifer Triage RN  -[facility] Ride  -Pt    Intervention/Education/Resources Provided:  SW received phone call from Jennifer Triage RN asking for assistance in arranging a ride for pt's infusion appointment at 12:30 pm today. Jennifer stated that she called pt twice and left a VM and is sure that pt will take the appointment for today. [employer] set up ride through pt's insurance for a round trip with a will call return ride. SW called pt and informed them of Jennifer calling and pt having a 12:30 pm appointment with rides set up. Pt verbalized understanding and asked SW if they could call Jennifer back to let her know that pt confirmed the appointment. SW touched based with Jennifer as well.     Assessment/Plan:  SW will remain available as needed.  Previously provided patient/family with writer's contact information and availability.       CARLOS Chavez,NATALIA  Hematology/Oncology Social Worker  Phone:[phone] Pager: [phone]

## 2021-04-24 ENCOUNTER — HOSPITAL ENCOUNTER (EMERGENCY)
Facility: CLINIC | Age: 21
Discharge: HOME | End: 2021-04-24
Attending: EMERGENCY MEDICINE | Admitting: EMERGENCY MEDICINE
Payer: COMMERCIAL

## 2021-04-24 VITALS
WEIGHT: 171.7 LBS | HEART RATE: 91 BPM | HEIGHT: 64 IN | BODY MASS INDEX: 29.31 KG/M2 | DIASTOLIC BLOOD PRESSURE: 72 MMHG | SYSTOLIC BLOOD PRESSURE: 119 MMHG | OXYGEN SATURATION: 100 % | TEMPERATURE: 98.2 F | RESPIRATION RATE: 14 BRPM

## 2021-04-24 DIAGNOSIS — D57.00 SICKLE CELL PAIN CRISIS (H): ICD-10-CM

## 2021-04-24 LAB
ALBUMIN UR-MCNC: NEGATIVE MG/DL
APPEARANCE UR: CLEAR
BASOPHILS # BLD AUTO: 0.2 10E9/L (ref 0–0.2)
BASOPHILS NFR BLD AUTO: 1.8 %
BILIRUB UR QL STRIP: NEGATIVE
COLOR UR AUTO: ABNORMAL
DIFFERENTIAL METHOD BLD: ABNORMAL
EOSINOPHIL # BLD AUTO: 1 10E9/L (ref 0–0.7)
EOSINOPHIL NFR BLD AUTO: 10 %
ERYTHROCYTE [DISTWIDTH] IN BLOOD BY AUTOMATED COUNT: 22.5 % (ref 10–15)
GLUCOSE UR STRIP-MCNC: NEGATIVE MG/DL
HCT VFR BLD AUTO: 22.2 % (ref 35–47)
HGB BLD-MCNC: 7.8 G/DL (ref 11.7–15.7)
HGB UR QL STRIP: NEGATIVE
IMM GRANULOCYTES # BLD: 0.1 10E9/L (ref 0–0.4)
IMM GRANULOCYTES NFR BLD: 0.5 %
KETONES UR STRIP-MCNC: NEGATIVE MG/DL
LEUKOCYTE ESTERASE UR QL STRIP: ABNORMAL
LYMPHOCYTES # BLD AUTO: 2.5 10E9/L (ref 0.8–5.3)
LYMPHOCYTES NFR BLD AUTO: 25 %
MCH RBC QN AUTO: 32.9 PG (ref 26.5–33)
MCHC RBC AUTO-ENTMCNC: 35.1 G/DL (ref 31.5–36.5)
MCV RBC AUTO: 94 FL (ref 78–100)
MONOCYTES # BLD AUTO: 0.7 10E9/L (ref 0–1.3)
MONOCYTES NFR BLD AUTO: 7 %
NEUTROPHILS # BLD AUTO: 5.7 10E9/L (ref 1.6–8.3)
NEUTROPHILS NFR BLD AUTO: 55.7 %
NITRATE UR QL: NEGATIVE
NRBC # BLD AUTO: 0.4 10*3/UL
NRBC BLD AUTO-RTO: 4 /100
PH UR STRIP: 6 PH (ref 5–7)
PLATELET # BLD AUTO: 342 10E9/L (ref 150–450)
RBC # BLD AUTO: 2.37 10E12/L (ref 3.8–5.2)
RBC #/AREA URNS AUTO: 1 /HPF (ref 0–2)
SOURCE: ABNORMAL
SP GR UR STRIP: 1.01 (ref 1–1.03)
SQUAMOUS #/AREA URNS AUTO: 1 /HPF (ref 0–1)
UROBILINOGEN UR STRIP-MCNC: 2 MG/DL (ref 0–2)
WBC # BLD AUTO: 10.1 10E9/L (ref 4–11)
WBC #/AREA URNS AUTO: 2 /HPF (ref 0–5)

## 2021-04-24 PROCEDURE — 99285 EMERGENCY DEPT VISIT HI MDM: CPT | Mod: 25 | Performed by: EMERGENCY MEDICINE

## 2021-04-24 PROCEDURE — 250N000013 HC RX MED GY IP 250 OP 250 PS 637: Performed by: EMERGENCY MEDICINE

## 2021-04-24 PROCEDURE — 96374 THER/PROPH/DIAG INJ IV PUSH: CPT | Performed by: EMERGENCY MEDICINE

## 2021-04-24 PROCEDURE — 96361 HYDRATE IV INFUSION ADD-ON: CPT | Performed by: EMERGENCY MEDICINE

## 2021-04-24 PROCEDURE — 81001 URINALYSIS AUTO W/SCOPE: CPT | Performed by: EMERGENCY MEDICINE

## 2021-04-24 PROCEDURE — 258N000003 HC RX IP 258 OP 636: Performed by: EMERGENCY MEDICINE

## 2021-04-24 PROCEDURE — 96375 TX/PRO/DX INJ NEW DRUG ADDON: CPT | Performed by: EMERGENCY MEDICINE

## 2021-04-24 PROCEDURE — 250N000011 HC RX IP 250 OP 636: Performed by: EMERGENCY MEDICINE

## 2021-04-24 PROCEDURE — 96376 TX/PRO/DX INJ SAME DRUG ADON: CPT | Performed by: EMERGENCY MEDICINE

## 2021-04-24 PROCEDURE — 85025 COMPLETE CBC W/AUTO DIFF WBC: CPT | Performed by: EMERGENCY MEDICINE

## 2021-04-24 PROCEDURE — 99285 EMERGENCY DEPT VISIT HI MDM: CPT | Performed by: EMERGENCY MEDICINE

## 2021-04-24 RX ORDER — ONDANSETRON 2 MG/ML
4 INJECTION INTRAMUSCULAR; INTRAVENOUS EVERY 30 MIN PRN
Status: DISCONTINUED | OUTPATIENT
Start: 2021-04-24 | End: 2021-04-25 | Stop reason: HOSPADM

## 2021-04-24 RX ORDER — MORPHINE SULFATE 4 MG/ML
4 INJECTION, SOLUTION INTRAMUSCULAR; INTRAVENOUS
Status: COMPLETED | OUTPATIENT
Start: 2021-04-24 | End: 2021-04-24

## 2021-04-24 RX ORDER — DIPHENHYDRAMINE HCL 50 MG
50 CAPSULE ORAL ONCE
Status: COMPLETED | OUTPATIENT
Start: 2021-04-24 | End: 2021-04-24

## 2021-04-24 RX ORDER — SODIUM CHLORIDE 9 MG/ML
INJECTION, SOLUTION INTRAVENOUS CONTINUOUS
Status: DISCONTINUED | OUTPATIENT
Start: 2021-04-24 | End: 2021-04-25 | Stop reason: HOSPADM

## 2021-04-24 RX ADMIN — OXYCODONE HYDROCHLORIDE 15 MG: 5 TABLET ORAL at 21:53

## 2021-04-24 RX ADMIN — DIPHENHYDRAMINE HYDROCHLORIDE 50 MG: 50 CAPSULE ORAL at 16:56

## 2021-04-24 RX ADMIN — MORPHINE SULFATE 4 MG: 4 INJECTION, SOLUTION INTRAMUSCULAR; INTRAVENOUS at 16:56

## 2021-04-24 RX ADMIN — SODIUM CHLORIDE: 9 INJECTION, SOLUTION INTRAVENOUS at 20:19

## 2021-04-24 RX ADMIN — SODIUM CHLORIDE 1000 ML: 9 INJECTION, SOLUTION INTRAVENOUS at 17:51

## 2021-04-24 RX ADMIN — MORPHINE SULFATE 4 MG: 4 INJECTION, SOLUTION INTRAMUSCULAR; INTRAVENOUS at 17:30

## 2021-04-24 RX ADMIN — MORPHINE SULFATE 4 MG: 4 INJECTION, SOLUTION INTRAMUSCULAR; INTRAVENOUS at 20:19

## 2021-04-24 RX ADMIN — SODIUM CHLORIDE 1000 ML: 9 INJECTION, SOLUTION INTRAVENOUS at 16:54

## 2021-04-24 RX ADMIN — ONDANSETRON 4 MG: 2 INJECTION INTRAMUSCULAR; INTRAVENOUS at 16:56

## 2021-04-24 ASSESSMENT — ENCOUNTER SYMPTOMS
SHORTNESS OF BREATH: 0
COLOR CHANGE: 0
NECK STIFFNESS: 0
DYSURIA: 0
HEADACHES: 0
FREQUENCY: 0
NUMBNESS: 0
BACK PAIN: 1
WEAKNESS: 0
DIFFICULTY URINATING: 0
CONFUSION: 0
FEVER: 0
EYE REDNESS: 0
ABDOMINAL PAIN: 0
ARTHRALGIAS: 0

## 2021-04-24 ASSESSMENT — MIFFLIN-ST. JEOR: SCORE: 1523.83

## 2021-04-24 NOTE — ED TRIAGE NOTES
"Presents for 10/10 sickle cell pain to lower back and joints. No relief from home meds. Denies cough/fever/shortness of breath/chest pain. Admission early March for \"blood clots in lungs and right arm\".  "

## 2021-04-24 NOTE — ED PROVIDER NOTES
[facility]ERG[facility] DEPARTMENT ([facility])  4/24/21    History     Chief Complaint   Patient presents with     Sickle Cell Pain Crisis     The history is provided by the patient and medical records.     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 Patient reports a usual sickle cell pain flare that is more severe in nature. Patient reports 10/10 sickle cell pain in the lower back. Patient denies any radiation to the leg. No numbness or weakness.  Patient reports that the current flare up started shortly after her infusion visit yesterday. She believes that the cold weather may have triggered the sickle cell pain. She notes taking Oxycodone, OxyContin, Celebrex, and Tylenol for the sickle cell pain.  She states that her last dose of oxycodone/OxyContin was approximately 1 to 2 hours ago.  Patient reports a recent PE around a month ago and blood clots in her arm and a cyst in her neck a few weeks ago.  She does not feel that the symptoms are similar to her blood clot and are entirely consistent instead with her sickle cell crisis.  She notes that a Port-A-Cath was recently inserted and was used at the infusion center yesterday.  No fevers, urinary symptoms, or abdominal pain.     Patient latest ED visit was on 4/22/2021. Chest x-ray was unremarkable for any pneumothorax.  Labs were unremarkable.  Patient received pain medications per her treatment plan and was discharged home.    I have reviewed the Medications, Allergies, Past Medical and Surgical History, and Social History in the [facility] system.  PAST MEDICAL HISTORY:   Past Medical History:   Diagnosis Date     Anemia      Anxiety      Bleeding disorder (H)      Blood clotting disorder (H)      Cerebral infarction (H) 2015     Cognitive  developmental delay     low IQ. 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 (H)     right hand contractures     Iron overload due to repeated red blood cell transfusions      Migraines      Multiple subsegmental pulmonary emboli without acute cor pulmonale (H) 02/01/2021     Oppositional defiant behavior      Superficial venous thrombosis of arm, right 03/25/2021     Uncomplicated asthma        PAST SURGICAL HISTORY:   Past Surgical History:   Procedure Laterality Date     AS INSERT TUNNELED CV 2 CATH W/O PORT/PUMP       C BREAST REDUCTION (INCLUDES LIPO) TIER 3 Bilateral 04/23/2019     CHOLECYSTECTOMY       INSERT PORT VASCULAR ACCESS Left 4/21/2021    Procedure: INSERTION, VASCULAR ACCESS PORT (NOT SURE ON SIDE UNTIL REMOVAL);  Surgeon: Rajan More MD;  Location: [facility] OR     IR CHEST PORT PLACEMENT > 5 YRS OF AGE  4/21/2021     IR CVC NON TUNNEL PLACEMENT  04/07/2020     IR PORT REMOVAL LEFT  4/21/2021     REMOVE PORT VASCULAR ACCESS Left 4/21/2021    Procedure: REMOVAL, VASCULAR ACCESS PORT LEFT;  Surgeon: Rajan More MD;  Location: [facility] OR     REPAIR TENDON ELBOW Right 10/02/2019    Procedure: Right Elbow Flexor Lengthening, Flexor Pronator Slide Of Wrist and Finger, Thumb Adductor Lengthening;  Surgeon: Anai Franco MD;  Location: [facility] OR     TONSILLECTOMY Bilateral 10/02/2019    Procedure: Bilateral Tonsillectomy;  Surgeon: Farhana Guy MD;  Location: [facility] OR       Past medical history, past surgical history, medications, and allergies were reviewed with the patient. Additional pertinent items: None    FAMILY HISTORY:   Family History   Problem Relation Age of Onset     Sickle Cell Trait Mother      Hypertension Mother      Asthma Mother      Sickle Cell Trait Father        SOCIAL HISTORY:   Social History     Tobacco Use     Smoking status: Never Smoker      Smokeless tobacco: Never Used   Substance Use Topics     Alcohol use: Not Currently     Alcohol/week: 0.0 standard drinks     Social history was reviewed with the patient. Additional pertinent items: None      Discharge Medication List as of 4/24/2021  9:41 PM      CONTINUE these medications which have NOT CHANGED    Details   acetaminophen (TYLENOL) 325 MG tablet Take 2 tablets (650 mg) by mouth every 6 hours as needed for mild pain, Disp-120 tablet, R-3, E-Prescribe      albuterol (PROAIR HFA/PROVENTIL HFA/VENTOLIN HFA) 108 (90 Base) MCG/ACT inhaler Inhale 2 puffs into the lungs every 6 hours as needed for shortness of breath / dyspnea or wheezing, Disp-8.5 g, R-3, E-PrescribePharmacy may dispense brand covered by insurance (Proair, or proventil or ventolin or generic albuterol inhaler)      albuterol (PROVENTIL) (2.5 MG/3ML) 0.083% neb solution Take 1 vial (2.5 mg) by nebulization every 6 hours as needed for shortness of breath / dyspnea or wheezing, Disp-12 mL, R-4, E-Prescribe      apixaban ANTICOAGULANT (ELIQUIS) 5 MG tablet Take 1 tablet (5 mg) by mouth 2 times daily, Disp-60 tablet, R-0, E-Prescribe      Apixaban Starter Pack (ELIQUIS DVT/PE STARTER PACK) 5 MG TBPK Take 10 mg by mouth 2 times daily for 7 days, THEN 5 mg 2 times daily for 23 days., Disp-74 each, R-0, E-Prescribe      ARIPiprazole (ABILIFY) 2 MG tablet Take 1 tablet (2 mg) by mouth daily, Disp-30 tablet, R-3, E-Prescribe      aspirin (ASPIRIN) 81 MG EC tablet Take 1 tablet (81 mg) by mouth daily . HOLD while on Xarelto, Disp-90 tablet, R-3, Historical      budesonide-formoterol (SYMBICORT) 160-4.5 MCG/ACT Inhaler Inhale 2 puffs into the lungs 2 times daily, Disp-10.2 g, R-3, E-Prescribe      celecoxib (CELEBREX) 100 MG capsule Take 1 capsule (100 mg) by mouth 2 times daily, Disp-60 capsule, R-3, E-Prescribe      diclofenac (VOLTAREN) 1 % topical gel Apply 4 g topically 4 times daily as needed for moderate pain Apply to back, legs, and arms  for pain, Disp-150 g, R-3, E-Prescribe      diphenhydrAMINE (BENADRYL) 25 MG capsule Take 1-2 capsules (25-50 mg) by mouth nightly as needed for sleep, Disp-60 capsule, R-3, E-Prescribe      EPINEPHrine (ANY BX GENERIC EQUIV) 0.3 MG/0.3ML injection 2-pack Inject 0.3 mLs (0.3 mg) into the muscle as needed for anaphylaxis, Disp-1 each, R-1, E-Prescribe      Hydroxyurea 1000 MG TABS Take 2,000 mg by mouth daily, Disp-60 tablet, R-3, E-Prescribe      JADENU 360 MG tablet Take 4 tablets (1,440 mg) by mouth every evening, Disp-30 tablet, R-0, SHIELA, E-Prescribe      medroxyPROGESTERone (DEPO-PROVERA) 150 MG/ML IM injection Inject 150 mg into the muscle, Historical      naloxone (NARCAN) 4 MG/0.1ML nasal spray Spray 1 spray (4 mg) into one nostril alternating nostrils once as needed for opioid reversal every 2-3 minutes until assistance arrives, Disp-0.2 mL,R-1, E-Prescribe      ondansetron (ZOFRAN) 8 MG tablet Historical      oxyCODONE (OXYCONTIN) 10 MG 12 hr tablet Take 1 tablet (10 mg) by mouth every 12 hours, Disp-60 tablet, R-0, E-Prescribe      oxyCODONE IR (ROXICODONE) 15 MG tablet Take 1 tablet (15 mg) by mouth every 6 hours as needed for severe pain, Disp-60 tablet, R-0, E-Prescribe      sertraline (ZOLOFT) 100 MG tablet Take 2 tablets (200 mg) by mouth daily, Disp-180 tablet, R-3, E-Prescribe                Allergies   Allergen Reactions     Fish-Derived Products Hives     Seafood Hives     Contrast Dye      Diagnostic X-Ray Materials      Gadolinium         Review of Systems   Constitutional: Negative for fever.   HENT: Negative for congestion.    Eyes: Negative for redness.   Respiratory: Negative for shortness of breath.    Cardiovascular: Negative for chest pain.   Gastrointestinal: Negative for abdominal pain.   Genitourinary: Negative for difficulty urinating, dysuria, frequency and urgency.   Musculoskeletal: Positive for back pain (lower). Negative for arthralgias and neck stiffness.   Skin: Negative for  "color change.   Neurological: Negative for weakness, numbness and headaches.   Psychiatric/Behavioral: Negative for confusion.     A complete review of systems was performed with pertinent positives and negatives noted in the HPI, and all other systems negative.    Physical Exam   BP: (!) 151/76  Pulse: 106  Temp: 98.2  F (36.8  C)  Resp: 16  Height: 162.6 cm (5' 4\")  Weight: 77.9 kg (171 lb 11.2 oz)  SpO2: 94 %      Physical Exam  Vitals signs and nursing note reviewed.   Constitutional:       General: She is in acute distress ( In painful distress).   HENT:      Head: Atraumatic.      Mouth/Throat:      Mouth: Mucous membranes are moist.   Eyes:      General: No scleral icterus.     Extraocular Movements: Extraocular movements intact.   Neck:      Musculoskeletal: Neck supple.   Cardiovascular:      Rate and Rhythm: Normal rate and regular rhythm.      Heart sounds: Normal heart sounds.   Pulmonary:      Effort: Pulmonary effort is normal. No respiratory distress.   Chest:      Chest wall: No tenderness.   Abdominal:      Palpations: Abdomen is soft.      Tenderness: There is no abdominal tenderness. There is no guarding or rebound.      Comments: Tender across lower back but denies localization over either kidney   Musculoskeletal:      Right lower leg: No edema.      Left lower leg: No edema.   Skin:     General: Skin is warm.      Coloration: Skin is not pale.   Neurological:      General: No focal deficit present.      Mental Status: She is alert and oriented to person, place, and time.         ED Course        Procedures         3:56 PM  The patient was seen and examined by Randall Renner MD in Room ED19.                        Results for orders placed or performed during the hospital encounter of 04/24/21 (from the past 24 hour(s))   CBC with platelets differential   Result Value Ref Range    WBC 10.1 4.0 - 11.0 10e9/L    RBC Count 2.37 (L) 3.8 - 5.2 10e12/L    Hemoglobin 7.8 (L) 11.7 - 15.7 g/dL "    Hematocrit 22.2 (L) 35.0 - 47.0 %    MCV 94 78 - 100 fl    MCH 32.9 26.5 - 33.0 pg    MCHC 35.1 31.5 - 36.5 g/dL    RDW 22.5 (H) 10.0 - 15.0 %    Platelet Count 342 150 - 450 10e9/L    Diff Method Automated Method     % Neutrophils 55.7 %    % Lymphocytes 25.0 %    % Monocytes 7.0 %    % Eosinophils 10.0 %    % Basophils 1.8 %    % Immature Granulocytes 0.5 %    Nucleated RBCs 4 (H) 0 /100    Absolute Neutrophil 5.7 1.6 - 8.3 10e9/L    Absolute Lymphocytes 2.5 0.8 - 5.3 10e9/L    Absolute Monocytes 0.7 0.0 - 1.3 10e9/L    Absolute Eosinophils 1.0 (H) 0.0 - 0.7 10e9/L    Absolute Basophils 0.2 0.0 - 0.2 10e9/L    Abs Immature Granulocytes 0.1 0 - 0.4 10e9/L    Absolute Nucleated RBC 0.4    UA with Microscopic   Result Value Ref Range    Color Urine Light Yellow     Appearance Urine Clear     Glucose Urine Negative NEG^Negative mg/dL    Bilirubin Urine Negative NEG^Negative    Ketones Urine Negative NEG^Negative mg/dL    Specific Gravity Urine 1.008 1.003 - 1.035    Blood Urine Negative NEG^Negative    pH Urine 6.0 5.0 - 7.0 pH    Protein Albumin Urine Negative NEG^Negative mg/dL    Urobilinogen mg/dL 2.0 0.0 - 2.0 mg/dL    Nitrite Urine Negative NEG^Negative    Leukocyte Esterase Urine Small (A) NEG^Negative    Source Midstream Urine     WBC Urine 2 0 - 5 /HPF    RBC Urine 1 0 - 2 /HPF    Squamous Epithelial /HPF Urine 1 0 - 1 /HPF     Medications   0.9% sodium chloride BOLUS (0 mLs Intravenous Stopped 4/24/21 1751)     Followed by   0.9% sodium chloride BOLUS (0 mLs Intravenous Stopped 4/24/21 2019)   morphine (PF) injection 4 mg (4 mg Intravenous Given 4/24/21 2019)   diphenhydrAMINE (BENADRYL) capsule 50 mg (50 mg Oral Given 4/24/21 1656)   oxyCODONE (ROXICODONE) tablet 15 mg (15 mg Oral Given 4/24/21 2153)             Assessments & Plan (with Medical Decision Making)   The patient has a history of sickle cell crisis and has her usual sickle cell pain across her lower back.  The patient appeared in significant  discomfort on arrival.  She is taking OxyContin and oxycodone at home without relief.  She was at the infusion center yesterday and felt better for a short period of time but has now worsened.  She did have a recent Port-A-Cath placed and has only used it once is there is still an open wound over the insert site.  The patient had a peripheral IV established and was given IV fluids and a total of 3 doses of IV morphine with significant relief.  She preferred to go home and will continue to push fluids and use her oxycodone.  I did advise her to take extra if her pain is not entirely controlled if that would allow her to stay out of the hospital.  Urinalysis was signed out to Dr. Diaz.  There is no other localizing symptom of the source of her crisis.  She has no symptoms of blood clot after her recent admission for PE.  The patient was given a dose of her scheduled oxycodone in the ED and feels significantly improved.    I have reviewed the nursing notes.    I have reviewed the findings, diagnosis, plan and need for follow up with the patient.    Discharge Medication List as of 4/24/2021  9:41 PM          Final diagnoses:   Sickle cell pain crisis (H)     I, Nu Small, am serving as a trained medical scribe to document services personally performed by Randall Renner MD, based on the provider's statements to me.     I, Randall Renner MD, was physically present and have reviewed and verified the accuracy of this note documented by Nu Small.    Randall Renner MD  4/24/2021   [facility] EMERGENCY DEPARTMENT     Randall Renner MD  04/25/21 0921

## 2021-04-25 ENCOUNTER — HOSPITAL ENCOUNTER (EMERGENCY)
Facility: CLINIC | Age: 21
Discharge: HOME | End: 2021-04-25
Attending: EMERGENCY MEDICINE | Admitting: EMERGENCY MEDICINE
Payer: COMMERCIAL

## 2021-04-25 ENCOUNTER — APPOINTMENT (OUTPATIENT)
Dept: GENERAL RADIOLOGY | Facility: CLINIC | Age: 21
End: 2021-04-25
Attending: EMERGENCY MEDICINE
Payer: COMMERCIAL

## 2021-04-25 VITALS
RESPIRATION RATE: 20 BRPM | WEIGHT: 171 LBS | TEMPERATURE: 98 F | OXYGEN SATURATION: 91 % | SYSTOLIC BLOOD PRESSURE: 139 MMHG | DIASTOLIC BLOOD PRESSURE: 89 MMHG | BODY MASS INDEX: 29.19 KG/M2 | HEIGHT: 64 IN | HEART RATE: 113 BPM

## 2021-04-25 DIAGNOSIS — D57.00 SICKLE CELL PAIN CRISIS (H): ICD-10-CM

## 2021-04-25 LAB
BASOPHILS # BLD AUTO: 0.2 10E9/L (ref 0–0.2)
BASOPHILS NFR BLD AUTO: 1.6 %
DIFFERENTIAL METHOD BLD: ABNORMAL
EOSINOPHIL # BLD AUTO: 1.1 10E9/L (ref 0–0.7)
EOSINOPHIL NFR BLD AUTO: 9.2 %
ERYTHROCYTE [DISTWIDTH] IN BLOOD BY AUTOMATED COUNT: 24.3 % (ref 10–15)
HCT VFR BLD AUTO: 19.3 % (ref 35–47)
HGB BLD-MCNC: 6.8 G/DL (ref 11.7–15.7)
IMM GRANULOCYTES # BLD: 0.2 10E9/L (ref 0–0.4)
IMM GRANULOCYTES NFR BLD: 1.2 %
LYMPHOCYTES # BLD AUTO: 2.3 10E9/L (ref 0.8–5.3)
LYMPHOCYTES NFR BLD AUTO: 18.6 %
MCH RBC QN AUTO: 31.8 PG (ref 26.5–33)
MCHC RBC AUTO-ENTMCNC: 35.2 G/DL (ref 31.5–36.5)
MCV RBC AUTO: 90 FL (ref 78–100)
MONOCYTES # BLD AUTO: 1 10E9/L (ref 0–1.3)
MONOCYTES NFR BLD AUTO: 7.6 %
NEUTROPHILS # BLD AUTO: 7.7 10E9/L (ref 1.6–8.3)
NEUTROPHILS NFR BLD AUTO: 61.8 %
NRBC # BLD AUTO: 0.5 10*3/UL
NRBC BLD AUTO-RTO: 4 /100
PLATELET # BLD AUTO: 329 10E9/L (ref 150–450)
RBC # BLD AUTO: 2.14 10E12/L (ref 3.8–5.2)
WBC # BLD AUTO: 12.4 10E9/L (ref 4–11)

## 2021-04-25 PROCEDURE — 96361 HYDRATE IV INFUSION ADD-ON: CPT | Performed by: EMERGENCY MEDICINE

## 2021-04-25 PROCEDURE — 96374 THER/PROPH/DIAG INJ IV PUSH: CPT | Performed by: EMERGENCY MEDICINE

## 2021-04-25 PROCEDURE — 258N000003 HC RX IP 258 OP 636: Performed by: EMERGENCY MEDICINE

## 2021-04-25 PROCEDURE — 96376 TX/PRO/DX INJ SAME DRUG ADON: CPT | Performed by: EMERGENCY MEDICINE

## 2021-04-25 PROCEDURE — 85025 COMPLETE CBC W/AUTO DIFF WBC: CPT | Performed by: EMERGENCY MEDICINE

## 2021-04-25 PROCEDURE — 71046 X-RAY EXAM CHEST 2 VIEWS: CPT | Mod: 26 | Performed by: RADIOLOGY

## 2021-04-25 PROCEDURE — 250N000011 HC RX IP 250 OP 636: Performed by: EMERGENCY MEDICINE

## 2021-04-25 PROCEDURE — 250N000013 HC RX MED GY IP 250 OP 250 PS 637: Performed by: EMERGENCY MEDICINE

## 2021-04-25 PROCEDURE — 71046 X-RAY EXAM CHEST 2 VIEWS: CPT

## 2021-04-25 PROCEDURE — 99285 EMERGENCY DEPT VISIT HI MDM: CPT | Mod: 25 | Performed by: EMERGENCY MEDICINE

## 2021-04-25 PROCEDURE — 99283 EMERGENCY DEPT VISIT LOW MDM: CPT | Performed by: EMERGENCY MEDICINE

## 2021-04-25 RX ORDER — SODIUM CHLORIDE 9 MG/ML
INJECTION, SOLUTION INTRAVENOUS CONTINUOUS
Status: DISCONTINUED | OUTPATIENT
Start: 2021-04-25 | End: 2021-04-25 | Stop reason: HOSPADM

## 2021-04-25 RX ORDER — DIPHENHYDRAMINE HCL 50 MG
50 CAPSULE ORAL ONCE
Status: COMPLETED | OUTPATIENT
Start: 2021-04-25 | End: 2021-04-25

## 2021-04-25 RX ORDER — ONDANSETRON 2 MG/ML
4 INJECTION INTRAMUSCULAR; INTRAVENOUS EVERY 30 MIN PRN
Status: DISCONTINUED | OUTPATIENT
Start: 2021-04-25 | End: 2021-04-25 | Stop reason: HOSPADM

## 2021-04-25 RX ORDER — MORPHINE SULFATE 4 MG/ML
4 INJECTION, SOLUTION INTRAMUSCULAR; INTRAVENOUS
Status: COMPLETED | OUTPATIENT
Start: 2021-04-25 | End: 2021-04-25

## 2021-04-25 RX ORDER — LIDOCAINE HYDROCHLORIDE 10 MG/ML
INJECTION, SOLUTION EPIDURAL; INFILTRATION; INTRACAUDAL; PERINEURAL
Status: DISCONTINUED
Start: 2021-04-25 | End: 2021-04-25 | Stop reason: HOSPADM

## 2021-04-25 RX ADMIN — MORPHINE SULFATE 4 MG: 4 INJECTION INTRAVENOUS at 13:31

## 2021-04-25 RX ADMIN — MORPHINE SULFATE 4 MG: 4 INJECTION INTRAVENOUS at 15:37

## 2021-04-25 RX ADMIN — MORPHINE SULFATE 4 MG: 4 INJECTION INTRAVENOUS at 14:23

## 2021-04-25 RX ADMIN — SODIUM CHLORIDE 1000 ML: 9 INJECTION, SOLUTION INTRAVENOUS at 13:26

## 2021-04-25 RX ADMIN — DIPHENHYDRAMINE HYDROCHLORIDE 50 MG: 50 CAPSULE ORAL at 13:27

## 2021-04-25 RX ADMIN — SODIUM CHLORIDE 1000 ML: 9 INJECTION, SOLUTION INTRAVENOUS at 14:27

## 2021-04-25 RX ADMIN — SODIUM CHLORIDE: 9 INJECTION, SOLUTION INTRAVENOUS at 15:39

## 2021-04-25 ASSESSMENT — ENCOUNTER SYMPTOMS
DIFFICULTY URINATING: 0
FEVER: 0
COLOR CHANGE: 0
NECK STIFFNESS: 0
ABDOMINAL PAIN: 0
CONFUSION: 0
EYE REDNESS: 0
COUGH: 0
ARTHRALGIAS: 0
HEADACHES: 0
SHORTNESS OF BREATH: 0
MYALGIAS: 1

## 2021-04-25 ASSESSMENT — MIFFLIN-ST. JEOR: SCORE: 1520.65

## 2021-04-25 NOTE — DISCHARGE INSTRUCTIONS
Please make an appointment to follow up with hematology clinic this week.     Return if trouble breathing, fever or unable to control pain.  Use an additional 5mg of oxycodone between doses as needed for 2-3 days to control your crisis.

## 2021-04-25 NOTE — ED TRIAGE NOTES
C/o sickle cell pain to right breast, seen yesterday  Since discharged home c/o worsening pain and hurts to take a deep breath due to pain  No relief with home meds, took a hot shower, heat pads

## 2021-04-25 NOTE — DISCHARGE INSTRUCTIONS
Please make an appointment to follow up with Your hematologist this week to consider a trasfusion.     Return if fever, feeling worse or any bleeding (black or bloody stool).

## 2021-04-25 NOTE — ED PROVIDER NOTES
"  History     Chief Complaint   Patient presents with     Sickle Cell Pain Crisis     The history is provided by the patient and medical records.     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 Patient reports similar pain as yesterday but more severe than her usual sickle cell pain.  The pain is primarily in her lower back but she also endorses a sharp pain near her right ribs; she states that the pain \"feels like its on the surface\". Patient reports shortness of breath due to the pain. Patient notes that when she had the blood clots in her upper extremity, she did not feel any pain. Patient does not think there are any clots in her lower extremities. Denies leg swelling or fevers.     Patient was seen here yesterday for sickle cell pain crisis and her usual sickle cell pain across her lower back.  Patient was given IV fluids and 2 doses of IV morphine with relief.  Patient noted that she would prefer to go home and continue to push fluids and use her oxycodone.    I have reviewed the Medications, Allergies, Past Medical and Surgical History, and Social History in the [facility] system.  PAST MEDICAL HISTORY:   Past Medical History:   Diagnosis Date     Anemia      Anxiety      Bleeding disorder (H)      Blood clotting disorder (H)      Cerebral infarction (H) 2015     Cognitive developmental delay     low IQ. 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 (H)     right hand contractures     Iron overload due to repeated red blood cell transfusions      Migraines      Multiple subsegmental pulmonary emboli without acute cor pulmonale (H) 02/01/2021     Oppositional defiant behavior      " Superficial venous thrombosis of arm, right 03/25/2021     Uncomplicated asthma        PAST SURGICAL HISTORY:   Past Surgical History:   Procedure Laterality Date     AS INSERT TUNNELED CV 2 CATH W/O PORT/PUMP       C BREAST REDUCTION (INCLUDES LIPO) TIER 3 Bilateral 04/23/2019     CHOLECYSTECTOMY       INSERT PORT VASCULAR ACCESS Left 4/21/2021    Procedure: INSERTION, VASCULAR ACCESS PORT (NOT SURE ON SIDE UNTIL REMOVAL);  Surgeon: Rajan More MD;  Location: [facility] OR     IR CHEST PORT PLACEMENT > 5 YRS OF AGE  4/21/2021     IR CVC NON TUNNEL PLACEMENT  04/07/2020     IR PORT REMOVAL LEFT  4/21/2021     REMOVE PORT VASCULAR ACCESS Left 4/21/2021    Procedure: REMOVAL, VASCULAR ACCESS PORT LEFT;  Surgeon: Rajan More MD;  Location: [facility] OR     REPAIR TENDON ELBOW Right 10/02/2019    Procedure: Right Elbow Flexor Lengthening, Flexor Pronator Slide Of Wrist and Finger, Thumb Adductor Lengthening;  Surgeon: Anai Franco MD;  Location: [facility] OR     TONSILLECTOMY Bilateral 10/02/2019    Procedure: Bilateral Tonsillectomy;  Surgeon: Farhana Guy MD;  Location: [facility] OR       Past medical history, past surgical history, medications, and allergies were reviewed with the patient. Additional pertinent items: None    FAMILY HISTORY:   Family History   Problem Relation Age of Onset     Sickle Cell Trait Mother      Hypertension Mother      Asthma Mother      Sickle Cell Trait Father        SOCIAL HISTORY:   Social History     Tobacco Use     Smoking status: Never Smoker     Smokeless tobacco: Never Used   Substance Use Topics     Alcohol use: Not Currently     Alcohol/week: 0.0 standard drinks     Social history was reviewed with the patient. Additional pertinent items: None      Patient's Medications   New Prescriptions    No medications on file   Previous Medications    ACETAMINOPHEN (TYLENOL) 325 MG TABLET    Take 2 tablets (650 mg) by mouth every 6 hours as needed for mild pain    ALBUTEROL (PROAIR  HFA/PROVENTIL HFA/VENTOLIN HFA) 108 (90 BASE) MCG/ACT INHALER    Inhale 2 puffs into the lungs every 6 hours as needed for shortness of breath / dyspnea or wheezing    ALBUTEROL (PROVENTIL) (2.5 MG/3ML) 0.083% NEB SOLUTION    Take 1 vial (2.5 mg) by nebulization every 6 hours as needed for shortness of breath / dyspnea or wheezing    APIXABAN ANTICOAGULANT (ELIQUIS) 5 MG TABLET    Take 1 tablet (5 mg) by mouth 2 times daily    ARIPIPRAZOLE (ABILIFY) 2 MG TABLET    Take 1 tablet (2 mg) by mouth daily    ASPIRIN (ASPIRIN) 81 MG EC TABLET    Take 1 tablet (81 mg) by mouth daily . HOLD while on Xarelto    BUDESONIDE-FORMOTEROL (SYMBICORT) 160-4.5 MCG/ACT INHALER    Inhale 2 puffs into the lungs 2 times daily    CELECOXIB (CELEBREX) 100 MG CAPSULE    Take 1 capsule (100 mg) by mouth 2 times daily    DICLOFENAC (VOLTAREN) 1 % TOPICAL GEL    Apply 4 g topically 4 times daily as needed for moderate pain Apply to back, legs, and arms for pain    DIPHENHYDRAMINE (BENADRYL) 25 MG CAPSULE    Take 1-2 capsules (25-50 mg) by mouth nightly as needed for sleep    EPINEPHRINE (ANY BX GENERIC EQUIV) 0.3 MG/0.3ML INJECTION 2-PACK    Inject 0.3 mLs (0.3 mg) into the muscle as needed for anaphylaxis    HYDROXYUREA 1000 MG TABS    Take 2,000 mg by mouth daily    JADENU 360 MG TABLET    Take 4 tablets (1,440 mg) by mouth every evening    MEDROXYPROGESTERONE (DEPO-PROVERA) 150 MG/ML IM INJECTION    Inject 150 mg into the muscle    NALOXONE (NARCAN) 4 MG/0.1ML NASAL SPRAY    Spray 1 spray (4 mg) into one nostril alternating nostrils once as needed for opioid reversal every 2-3 minutes until assistance arrives    ONDANSETRON (ZOFRAN) 8 MG TABLET        OXYCODONE (OXYCONTIN) 10 MG 12 HR TABLET    Take 1 tablet (10 mg) by mouth every 12 hours    OXYCODONE IR (ROXICODONE) 15 MG TABLET    Take 1 tablet (15 mg) by mouth every 6 hours as needed for severe pain    SERTRALINE (ZOLOFT) 100 MG TABLET    Take 2 tablets (200 mg) by mouth daily  "  Modified Medications    No medications on file   Discontinued Medications    No medications on file          Allergies   Allergen Reactions     Fish-Derived Products Hives     Seafood Hives     Contrast Dye      Diagnostic X-Ray Materials      Gadolinium         Review of Systems   Constitutional: Negative for fever.   HENT: Negative for congestion.    Eyes: Negative for redness.   Respiratory: Negative for cough and shortness of breath.    Cardiovascular: Negative for chest pain and leg swelling.   Gastrointestinal: Negative for abdominal pain.   Genitourinary: Negative for difficulty urinating.   Musculoskeletal: Positive for myalgias (right rib). Negative for arthralgias and neck stiffness.   Skin: Negative for color change.   Neurological: Negative for headaches.   Psychiatric/Behavioral: Negative for confusion.     A complete review of systems was performed with pertinent positives and negatives noted in the HPI, and all other systems negative.    Physical Exam   BP: 139/82  Pulse: 105  Temp: 98  F (36.7  C)  Height: 162.6 cm (5' 4\")  Weight: 77.6 kg (171 lb)  SpO2: 94 %      Physical Exam  Chest:             ED Course        Procedures         12:40 PM  The patient was seen and examined by Randall Renner MD in Room ED11.                        Results for orders placed or performed during the hospital encounter of 04/25/21 (from the past 24 hour(s))   CBC with platelets differential   Result Value Ref Range    WBC 12.4 (H) 4.0 - 11.0 10e9/L    RBC Count 2.14 (L) 3.8 - 5.2 10e12/L    Hemoglobin 6.8 (LL) 11.7 - 15.7 g/dL    Hematocrit 19.3 (L) 35.0 - 47.0 %    MCV 90 78 - 100 fl    MCH 31.8 26.5 - 33.0 pg    MCHC 35.2 31.5 - 36.5 g/dL    RDW 24.3 (H) 10.0 - 15.0 %    Platelet Count 329 150 - 450 10e9/L    Diff Method Automated Method     % Neutrophils 61.8 %    % Lymphocytes 18.6 %    % Monocytes 7.6 %    % Eosinophils 9.2 %    % Basophils 1.6 %    % Immature Granulocytes 1.2 %    Nucleated RBCs 4 (H) " 0 /100    Absolute Neutrophil 7.7 1.6 - 8.3 10e9/L    Absolute Lymphocytes 2.3 0.8 - 5.3 10e9/L    Absolute Monocytes 1.0 0.0 - 1.3 10e9/L    Absolute Eosinophils 1.1 (H) 0.0 - 0.7 10e9/L    Absolute Basophils 0.2 0.0 - 0.2 10e9/L    Abs Immature Granulocytes 0.2 0 - 0.4 10e9/L    Absolute Nucleated RBC 0.5    XR Chest 2 Views    Narrative    Exam: XR CHEST 2 VW, 4/25/2021 1:23 PM    Indication: right lower chest pain    Comparison: 4/22/2021    Findings:   Left sided chest port tip projects over the low SVC. Trachea is  midline. Normal cardiomediastinal silhouette. No pneumothorax or  pleural effusions.  No focal airspace opacities. Right upper quadrant surgical clip in the  abdomen. Osseous and soft tissue structures are unremarkable.      Impression    Impression:   No acute airspace disease. Unchanged positioning of left-sided chest  port.    I have personally reviewed the examination and initial interpretation  and I agree with the findings.    BRENDA CURTIS MD     Medications   0.9% sodium chloride BOLUS (0 mLs Intravenous Stopped 4/25/21 1427)     Followed by   0.9% sodium chloride BOLUS (0 mLs Intravenous Stopped 4/25/21 1539)     Followed by   sodium chloride 0.9% infusion ( Intravenous New Bag 4/25/21 1539)   ondansetron (ZOFRAN) injection 4 mg (has no administration in time range)   lidocaine (PF) (XYLOCAINE) 1 % injection (has no administration in time range)   morphine (PF) injection 4 mg (4 mg Intravenous Given 4/25/21 1537)   diphenhydrAMINE (BENADRYL) capsule 50 mg (50 mg Oral Given 4/25/21 1327)             Assessments & Plan (with Medical Decision Making)   The patient returns with worsening sickle cell pain.  She was here yesterday and seen by me for low back pain.  Urinalysis was negative and she otherwise looks well.  She does truly appear to be in crisis pain.  Today she has some new pain in the right lower ribs but states this feels different than when she had the pulmonary embolism which was  actually asymptomatic and picked up on EKG according to her.  She is taking her Eliquis and has no new swelling similar to when she had a DVT in her right arm.  The patient had an IV established was given IV fluids and repeated doses of morphine with significant improvement in her pain.  She now feels as though she can go home.  I did speak with the hematology fellow regarding her declining hemoglobin given her history of strokes.  He recommended that she follow-up in the infusion center this week for repeat hemoglobin to consider whether to transfuse.  At this time she has no bleeding symptoms and otherwise feels better and has adequate pain medication at home.    I have reviewed the nursing notes.    I have reviewed the findings, diagnosis, plan and need for follow up with the patient.    New Prescriptions    No medications on file       Final diagnoses:   Sickle cell pain crisis (H)     I, Nu Small, am serving as a trained medical scribe to document services personally performed by Randall Renner MD, based on the provider's statements to me.     I, Randall Renner MD, was physically present and have reviewed and verified the accuracy of this note documented by Nu Small.    Randall Renner MD  4/25/2021   [facility] EMERGENCY DEPARTMENT     Randall Renner MD  04/25/21 1640

## 2021-04-26 ENCOUNTER — TELEPHONE (OUTPATIENT)
Dept: ONCOLOGY | Facility: CLINIC | Age: 21
End: 2021-04-26

## 2021-04-26 ENCOUNTER — ONCOLOGY VISIT (OUTPATIENT)
Dept: ONCOLOGY | Facility: CLINIC | Age: 21
DRG: 811 | End: 2021-04-26
Attending: PHYSICIAN ASSISTANT
Payer: COMMERCIAL

## 2021-04-26 ENCOUNTER — HOSPITAL ENCOUNTER (INPATIENT)
Facility: CLINIC | Age: 21
LOS: 15 days | Discharge: HOME | DRG: 811 | End: 2021-05-11
Attending: INTERNAL MEDICINE | Admitting: STUDENT IN AN ORGANIZED HEALTH CARE EDUCATION/TRAINING PROGRAM
Payer: COMMERCIAL

## 2021-04-26 ENCOUNTER — HOME INFUSION (PRE-WILLOW HOME INFUSION) (OUTPATIENT)
Dept: PHARMACY | Facility: CLINIC | Age: 21
End: 2021-04-26

## 2021-04-26 ENCOUNTER — PATIENT OUTREACH (OUTPATIENT)
Dept: CARE COORDINATION | Facility: CLINIC | Age: 21
End: 2021-04-26

## 2021-04-26 VITALS
BODY MASS INDEX: 28.68 KG/M2 | RESPIRATION RATE: 18 BRPM | HEART RATE: 119 BPM | SYSTOLIC BLOOD PRESSURE: 136 MMHG | DIASTOLIC BLOOD PRESSURE: 85 MMHG | OXYGEN SATURATION: 89 % | WEIGHT: 167.1 LBS

## 2021-04-26 VITALS — OXYGEN SATURATION: 94 % | SYSTOLIC BLOOD PRESSURE: 122 MMHG | DIASTOLIC BLOOD PRESSURE: 73 MMHG | HEART RATE: 107 BPM

## 2021-04-26 DIAGNOSIS — D57.1 HB-SS DISEASE WITHOUT CRISIS (H): ICD-10-CM

## 2021-04-26 DIAGNOSIS — I26.99 PULMONARY EMBOLISM, OTHER, UNSPECIFIED CHRONICITY, UNSPECIFIED WHETHER ACUTE COR PULMONALE PRESENT (H): Primary | ICD-10-CM

## 2021-04-26 DIAGNOSIS — R00.0 TACHYCARDIA: ICD-10-CM

## 2021-04-26 DIAGNOSIS — D57.00 SICKLE CELL PAIN CRISIS (H): Primary | ICD-10-CM

## 2021-04-26 DIAGNOSIS — J45.909 ASTHMATIC BRONCHITIS WITHOUT COMPLICATION, UNSPECIFIED ASTHMA SEVERITY, UNSPECIFIED WHETHER PERSISTENT: ICD-10-CM

## 2021-04-26 DIAGNOSIS — R09.02 HYPOXIA: ICD-10-CM

## 2021-04-26 DIAGNOSIS — D57.00 HB-SS DISEASE WITH CRISIS (H): ICD-10-CM

## 2021-04-26 LAB
ALBUMIN SERPL-MCNC: 3.9 G/DL (ref 3.4–5)
ALP SERPL-CCNC: 89 U/L (ref 40–150)
ALT SERPL W P-5'-P-CCNC: 117 U/L (ref 0–50)
ANION GAP SERPL CALCULATED.3IONS-SCNC: 9 MMOL/L (ref 3–14)
ANISOCYTOSIS BLD QL SMEAR: ABNORMAL
AST SERPL W P-5'-P-CCNC: 131 U/L (ref 0–45)
BASOPHILS # BLD AUTO: 0.3 10E9/L (ref 0–0.2)
BASOPHILS NFR BLD AUTO: 2.7 %
BILIRUB SERPL-MCNC: 4.6 MG/DL (ref 0.2–1.3)
BUN SERPL-MCNC: 7 MG/DL (ref 7–30)
CALCIUM SERPL-MCNC: 9.2 MG/DL (ref 8.5–10.1)
CHLORIDE SERPL-SCNC: 108 MMOL/L (ref 94–109)
CO2 SERPL-SCNC: 22 MMOL/L (ref 20–32)
CREAT SERPL-MCNC: 0.58 MG/DL (ref 0.52–1.04)
DIFFERENTIAL METHOD BLD: ABNORMAL
EOSINOPHIL # BLD AUTO: 0.9 10E9/L (ref 0–0.7)
EOSINOPHIL NFR BLD AUTO: 7.2 %
ERYTHROCYTE [DISTWIDTH] IN BLOOD BY AUTOMATED COUNT: 25.6 % (ref 10–15)
GFR SERPL CREATININE-BSD FRML MDRD: >90 ML/MIN/{1.73_M2}
GLUCOSE SERPL-MCNC: 96 MG/DL (ref 70–99)
HCT VFR BLD AUTO: 20.7 % (ref 35–47)
HGB BLD-MCNC: 7.6 G/DL (ref 11.7–15.7)
LABORATORY COMMENT REPORT: NORMAL
LYMPHOCYTES # BLD AUTO: 3 10E9/L (ref 0.8–5.3)
LYMPHOCYTES NFR BLD AUTO: 24.3 %
MCH RBC QN AUTO: 32.6 PG (ref 26.5–33)
MCHC RBC AUTO-ENTMCNC: 36.7 G/DL (ref 31.5–36.5)
MCV RBC AUTO: 89 FL (ref 78–100)
MONOCYTES # BLD AUTO: 0.4 10E9/L (ref 0–1.3)
MONOCYTES NFR BLD AUTO: 3.6 %
NEUTROPHILS # BLD AUTO: 7.7 10E9/L (ref 1.6–8.3)
NEUTROPHILS NFR BLD AUTO: 62.2 %
NRBC # BLD AUTO: 1 10*3/UL
NRBC BLD AUTO-RTO: 8 /100
PLATELET # BLD AUTO: 335 10E9/L (ref 150–450)
PLATELET # BLD EST: ABNORMAL 10*3/UL
POIKILOCYTOSIS BLD QL SMEAR: ABNORMAL
POLYCHROMASIA BLD QL SMEAR: ABNORMAL
POTASSIUM SERPL-SCNC: 3.7 MMOL/L (ref 3.4–5.3)
PROT SERPL-MCNC: 8.5 G/DL (ref 6.8–8.8)
RBC # BLD AUTO: 2.33 10E12/L (ref 3.8–5.2)
RBC INCLUSIONS BLD: SLIGHT
RETICS # AUTO: 436.6 10E9/L (ref 25–95)
RETICS/RBC NFR AUTO: 18.5 % (ref 0.5–2)
SARS-COV-2 RNA RESP QL NAA+PROBE: NEGATIVE
SARS-COV-2 RNA RESP QL NAA+PROBE: NORMAL
SICKLE CELLS BLD QL SMEAR: ABNORMAL
SODIUM SERPL-SCNC: 139 MMOL/L (ref 133–144)
SPECIMEN SOURCE: NORMAL
SPECIMEN SOURCE: NORMAL
TARGETS BLD QL SMEAR: ABNORMAL
WBC # BLD AUTO: 12.4 10E9/L (ref 4–11)

## 2021-04-26 PROCEDURE — 93010 ELECTROCARDIOGRAM REPORT: CPT | Performed by: INTERNAL MEDICINE

## 2021-04-26 PROCEDURE — 250N000011 HC RX IP 250 OP 636: Performed by: PEDIATRICS

## 2021-04-26 PROCEDURE — 93005 ELECTROCARDIOGRAM TRACING: CPT

## 2021-04-26 PROCEDURE — 85025 COMPLETE CBC W/AUTO DIFF WBC: CPT | Performed by: PHYSICIAN ASSISTANT

## 2021-04-26 PROCEDURE — 99215 OFFICE O/P EST HI 40 MIN: CPT | Performed by: PHYSICIAN ASSISTANT

## 2021-04-26 PROCEDURE — 99207 PR APP CREDIT; MD BILLING SHARED VISIT: CPT | Performed by: PHYSICIAN ASSISTANT

## 2021-04-26 PROCEDURE — 85045 AUTOMATED RETICULOCYTE COUNT: CPT | Performed by: PHYSICIAN ASSISTANT

## 2021-04-26 PROCEDURE — 80053 COMPREHEN METABOLIC PANEL: CPT | Performed by: PHYSICIAN ASSISTANT

## 2021-04-26 PROCEDURE — 96361 HYDRATE IV INFUSION ADD-ON: CPT

## 2021-04-26 PROCEDURE — U0003 INFECTIOUS AGENT DETECTION BY NUCLEIC ACID (DNA OR RNA); SEVERE ACUTE RESPIRATORY SYNDROME CORONAVIRUS 2 (SARS-COV-2) (CORONAVIRUS DISEASE [COVID-19]), AMPLIFIED PROBE TECHNIQUE, MAKING USE OF HIGH THROUGHPUT TECHNOLOGIES AS DESCRIBED BY CMS-2020-01-R: HCPCS | Performed by: PHYSICIAN ASSISTANT

## 2021-04-26 PROCEDURE — 96374 THER/PROPH/DIAG INJ IV PUSH: CPT

## 2021-04-26 PROCEDURE — 258N000003 HC RX IP 258 OP 636: Performed by: PHYSICIAN ASSISTANT

## 2021-04-26 PROCEDURE — U0005 INFEC AGEN DETEC AMPLI PROBE: HCPCS | Performed by: PHYSICIAN ASSISTANT

## 2021-04-26 PROCEDURE — 258N000003 HC RX IP 258 OP 636: Performed by: PEDIATRICS

## 2021-04-26 PROCEDURE — 250N000011 HC RX IP 250 OP 636: Performed by: PHYSICIAN ASSISTANT

## 2021-04-26 PROCEDURE — 120N000002 HC R&B MED SURG/OB UMMC

## 2021-04-26 PROCEDURE — 96376 TX/PRO/DX INJ SAME DRUG ADON: CPT

## 2021-04-26 PROCEDURE — 99223 1ST HOSP IP/OBS HIGH 75: CPT | Mod: AI | Performed by: STUDENT IN AN ORGANIZED HEALTH CARE EDUCATION/TRAINING PROGRAM

## 2021-04-26 RX ORDER — ALBUTEROL SULFATE 0.83 MG/ML
2.5 SOLUTION RESPIRATORY (INHALATION) EVERY 6 HOURS PRN
Status: DISCONTINUED | OUTPATIENT
Start: 2021-04-26 | End: 2021-05-02

## 2021-04-26 RX ORDER — ONDANSETRON 2 MG/ML
4-8 INJECTION INTRAMUSCULAR; INTRAVENOUS EVERY 6 HOURS PRN
Status: DISCONTINUED | OUTPATIENT
Start: 2021-04-26 | End: 2021-05-11 | Stop reason: HOSPADM

## 2021-04-26 RX ORDER — ARIPIPRAZOLE 2 MG/1
2 TABLET ORAL DAILY
Status: DISCONTINUED | OUTPATIENT
Start: 2021-04-26 | End: 2021-05-11 | Stop reason: HOSPADM

## 2021-04-26 RX ORDER — OXYCODONE HCL 10 MG/1
10 TABLET, FILM COATED, EXTENDED RELEASE ORAL EVERY 12 HOURS
Status: DISCONTINUED | OUTPATIENT
Start: 2021-04-26 | End: 2021-04-27

## 2021-04-26 RX ORDER — ALBUTEROL SULFATE 90 UG/1
2 AEROSOL, METERED RESPIRATORY (INHALATION) EVERY 6 HOURS PRN
Status: DISCONTINUED | OUTPATIENT
Start: 2021-04-26 | End: 2021-05-11 | Stop reason: HOSPADM

## 2021-04-26 RX ORDER — HEPARIN SODIUM,PORCINE 10 UNIT/ML
5 VIAL (ML) INTRAVENOUS
Status: CANCELLED | OUTPATIENT
Start: 2021-04-26

## 2021-04-26 RX ORDER — AMOXICILLIN 250 MG
1 CAPSULE ORAL 2 TIMES DAILY
Status: DISCONTINUED | OUTPATIENT
Start: 2021-04-26 | End: 2021-05-11 | Stop reason: HOSPADM

## 2021-04-26 RX ORDER — DEFERASIROX 360 MG/1
1440 TABLET, FILM COATED ORAL EVERY EVENING
Status: DISCONTINUED | OUTPATIENT
Start: 2021-04-26 | End: 2021-05-11 | Stop reason: HOSPADM

## 2021-04-26 RX ORDER — HEPARIN SODIUM (PORCINE) LOCK FLUSH IV SOLN 100 UNIT/ML 100 UNIT/ML
5 SOLUTION INTRAVENOUS
Status: CANCELLED | OUTPATIENT
Start: 2021-04-26

## 2021-04-26 RX ORDER — DIPHENHYDRAMINE HCL 25 MG
25 CAPSULE ORAL
Status: CANCELLED
Start: 2021-04-26

## 2021-04-26 RX ORDER — SERTRALINE HYDROCHLORIDE 100 MG/1
200 TABLET, FILM COATED ORAL DAILY
Status: DISCONTINUED | OUTPATIENT
Start: 2021-04-26 | End: 2021-05-11 | Stop reason: HOSPADM

## 2021-04-26 RX ORDER — ACETAMINOPHEN 325 MG/1
650 TABLET ORAL EVERY 6 HOURS PRN
Status: DISCONTINUED | OUTPATIENT
Start: 2021-04-26 | End: 2021-05-11 | Stop reason: HOSPADM

## 2021-04-26 RX ORDER — SODIUM CHLORIDE 9 MG/ML
INJECTION, SOLUTION INTRAVENOUS CONTINUOUS
Status: DISCONTINUED | OUTPATIENT
Start: 2021-04-26 | End: 2021-05-11

## 2021-04-26 RX ORDER — MORPHINE SULFATE 2 MG/ML
2 INJECTION, SOLUTION INTRAMUSCULAR; INTRAVENOUS
Status: COMPLETED | OUTPATIENT
Start: 2021-04-26 | End: 2021-04-26

## 2021-04-26 RX ORDER — LIDOCAINE 40 MG/G
CREAM TOPICAL
Status: DISCONTINUED | OUTPATIENT
Start: 2021-04-26 | End: 2021-05-11 | Stop reason: HOSPADM

## 2021-04-26 RX ORDER — MORPHINE SULFATE 2 MG/ML
2 INJECTION, SOLUTION INTRAMUSCULAR; INTRAVENOUS
Status: CANCELLED
Start: 2021-04-26

## 2021-04-26 RX ORDER — DIPHENHYDRAMINE HCL 25 MG
25 CAPSULE ORAL EVERY 6 HOURS PRN
Status: DISCONTINUED | OUTPATIENT
Start: 2021-04-26 | End: 2021-05-11 | Stop reason: HOSPADM

## 2021-04-26 RX ORDER — AMOXICILLIN 250 MG
2 CAPSULE ORAL 2 TIMES DAILY
Status: DISCONTINUED | OUTPATIENT
Start: 2021-04-26 | End: 2021-05-11 | Stop reason: HOSPADM

## 2021-04-26 RX ORDER — ONDANSETRON 8 MG/1
8 TABLET, FILM COATED ORAL
Status: CANCELLED
Start: 2021-04-26

## 2021-04-26 RX ORDER — CELECOXIB 100 MG/1
100 CAPSULE ORAL 2 TIMES DAILY
Status: DISCONTINUED | OUTPATIENT
Start: 2021-04-26 | End: 2021-05-11 | Stop reason: HOSPADM

## 2021-04-26 RX ORDER — POLYETHYLENE GLYCOL 3350 17 G/17G
17 POWDER, FOR SOLUTION ORAL DAILY
Status: DISCONTINUED | OUTPATIENT
Start: 2021-04-26 | End: 2021-05-11 | Stop reason: HOSPADM

## 2021-04-26 RX ORDER — HYDROXYUREA 500 MG/1
2000 CAPSULE ORAL DAILY
Status: DISCONTINUED | OUTPATIENT
Start: 2021-04-27 | End: 2021-05-11 | Stop reason: HOSPADM

## 2021-04-26 RX ADMIN — MORPHINE SULFATE 2 MG: 2 INJECTION, SOLUTION INTRAMUSCULAR; INTRAVENOUS at 14:41

## 2021-04-26 RX ADMIN — DEXTROSE AND SODIUM CHLORIDE 500 ML: 5; 450 INJECTION, SOLUTION INTRAVENOUS at 14:44

## 2021-04-26 RX ADMIN — Medication: at 20:24

## 2021-04-26 RX ADMIN — MORPHINE SULFATE 2 MG: 2 INJECTION, SOLUTION INTRAMUSCULAR; INTRAVENOUS at 16:50

## 2021-04-26 RX ADMIN — MORPHINE SULFATE 2 MG: 2 INJECTION, SOLUTION INTRAMUSCULAR; INTRAVENOUS at 15:44

## 2021-04-26 RX ADMIN — SODIUM CHLORIDE: 9 INJECTION, SOLUTION INTRAVENOUS at 20:58

## 2021-04-26 ASSESSMENT — PAIN SCALES - GENERAL
PAINLEVEL: EXTREME PAIN (9)
PAINLEVEL: EXTREME PAIN (9)
PAINLEVEL: WORST PAIN (10)

## 2021-04-26 ASSESSMENT — ACTIVITIES OF DAILY LIVING (ADL): ADLS_ACUITY_SCORE: 12

## 2021-04-26 NOTE — NURSING NOTE
Chief Complaint   Patient presents with     Blood Draw     labs drawn via PIV by Jennifer DIAZ in lab      /85   Pulse 119   Resp 18   Wt 75.8 kg (167 lb 1.6 oz)   SpO2 (!) 89%   BMI 28.68 kg/m      PIV placed to LFA by RN in lab. Line flushed with normal saline. Provider Andrei paged regarding low 02 sat, c/o pain 10/10, port site. Provider escorted pt to infusion.  Checked in for next appointment.    Echo Samuel RN on 4/26/2021 at 2:24 PM

## 2021-04-26 NOTE — PROGRESS NOTES
[facility]  Transfer Triage Note    Date of call: 04/26/21  Time of call: 2:42 PM    Is pandemic COVID-19 a concern? NO    Reason for transfer: Further diagnostic work up, management, and consultation for specialized care   Diagnosis: sickle cell disease with acute pain crisis     Outside Records: Available  Additional records requested to be faxed to [phone].    Stability of Patient: Patient is vitally stable, with no critical labs, and will likely remain stable throughout the transfer process  ICU: No    Expected Time of Arrival for Transfer: 0-8 hours    Arrival Location:  [place] [address] Phone: [phone]    Recommendations for Management and Stabilization: Not needed    Additional Comments :   Ms. Cervantes is a 20 yo F with HgbSS disease, CVA (left MCA with right hemiparesis and right upper extremity contracture), DVT (usually on Eliquis), asthma, and iron overload, who has been dealing with worsening sickle-cell pain for the last couple weeks with increased ER and infusion center visits. She was seen in the ER as recently as last night (CXR ok at that time, satting mid-90s on RA), was hoping to manage at home - seen in clinic today with continued severe pain. Reportedly today with increasing chest pain, no SOB, cough, fever or other infectious symptoms. Note she had to hold apixaban for port replacement last week - site still swollen and painful and not using currently.     Found to be mildly hypoxic, 88% on RA but now doing well on 2-3L, heme RONALDO Forbes worried she needs better pain control, evaluation for acute chest vs PE vs other cause for hypoxia, and further inpatient management. Provider reports no plans for empiric antibiotics at this point pending further studies, plan to will obtain COVID swab and CT-PE while she is still in clinic.     At this point would accept to a med-surg bed, PA will get back to us  regarding COVID or if barriers to getting imaging completed.      Addendum: patient with a reported contrast allergy, in the past for evaluation has required a V/Q scan. Likely unable to do this in clinic.     Trinidad Osei MD

## 2021-04-26 NOTE — PROGRESS NOTES
Patient received morphine x 3 and pain improved to 6/10.  PIV saline locked and left intact for hospital admission. [facility]  Report called to unit 7C by RONALDO Allan.

## 2021-04-26 NOTE — TELEPHONE ENCOUNTER
Pt called in to triage requesting IVF pain meds for low back pain rated 9/10 x4 days. Stated last took prn Oxy IR every 6 hours without relief, she has not tried to take this more frequent. Denied any fevers, chills, cough, sob, chest pain or other symptoms.     Pt went to the ED both Saturday and Sunday, prior to that was in clinic infusion 4/23, 4/20,4/19, 4/15, 4/14, 4/12. Informed pt this many frequent visits for ivf pain meds care team would recommend admission. Pt stated again due to her housing situation she wants to stay out of the hospital. She is also running low on IR oxy and needs monthly fill of oxycontin. Paged Andrei.    Per Andrei: ok for ivf pain meds, see Andrei today at 2:45 pm in clinic with labs prior. Will order refills when seen. Per [facility], no availability now call when pt is here. Pt scheduled for labs 2 pm, Andrei at 2:45 pm, waiting for other infusion sites for openings. Pt informed and [facility] paged for ride assistance.

## 2021-04-26 NOTE — PROGRESS NOTES
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      She was admitted 2/1/21-2/3/21 with a new PE, started on Rivaroxaban. Switched to Eliquis 3/25/21 with RUE DVT.    She has been having issues the last few weeks with increased pain and frequent ED visits. I was asked to see her today for ongoing pain.     Interval History:  Jennifer was seen today for follow-up. She is in tears when I see her 2/2 pain which started up again this morning after being slightly improved yesterday after ED visit. She has severe pain in her right ribs and her entire back. She has tried her home pain meds with no relief. She is taking her Eliquis but did have to hold it on 4/20 and 4/21 for port placement. Her port is still a bit swollen and tender so not being used yet. She otherwise denies fevers, headaches, dizziness, chest pain, SOB, cough, nausea, vomiting, abdominal pain, bowel or bladder concerns, edema. She states the pain in her right ribs is not much different with deep breathing.     Current Outpatient Medications   Medication Sig Dispense Refill     acetaminophen (TYLENOL) 325 MG tablet Take 2 tablets (650 mg) by mouth every 6 hours as needed for mild pain 120 tablet 3     albuterol (PROAIR HFA/PROVENTIL HFA/VENTOLIN HFA) 108 (90 Base) MCG/ACT inhaler Inhale 2 puffs into the lungs every 6 hours as needed for shortness of breath / dyspnea or wheezing 8.5 g 3     albuterol (PROVENTIL) (2.5 MG/3ML) 0.083% neb solution Take 1 vial  (2.5 mg) by nebulization every 6 hours as needed for shortness of breath / dyspnea or wheezing 12 mL 4     apixaban ANTICOAGULANT (ELIQUIS) 5 MG tablet Take 1 tablet (5 mg) by mouth 2 times daily 60 tablet 0     ARIPiprazole (ABILIFY) 2 MG tablet Take 1 tablet (2 mg) by mouth daily 30 tablet 3     aspirin (ASPIRIN) 81 MG EC tablet Take 1 tablet (81 mg) by mouth daily . HOLD while on Xarelto 90 tablet 3     budesonide-formoterol (SYMBICORT) 160-4.5 MCG/ACT Inhaler Inhale 2 puffs into the lungs 2 times daily 10.2 g 3     celecoxib (CELEBREX) 100 MG capsule Take 1 capsule (100 mg) by mouth 2 times daily 60 capsule 3     diclofenac (VOLTAREN) 1 % topical gel Apply 4 g topically 4 times daily as needed for moderate pain Apply to back, legs, and arms for pain 150 g 3     diphenhydrAMINE (BENADRYL) 25 MG capsule Take 1-2 capsules (25-50 mg) by mouth nightly as needed for sleep 60 capsule 3     EPINEPHrine (ANY BX GENERIC EQUIV) 0.3 MG/0.3ML injection 2-pack Inject 0.3 mLs (0.3 mg) into the muscle as needed for anaphylaxis 1 each 1     Hydroxyurea 1000 MG TABS Take 2,000 mg by mouth daily 60 tablet 3     JADENU 360 MG tablet Take 4 tablets (1,440 mg) by mouth every evening 30 tablet 0     medroxyPROGESTERone (DEPO-PROVERA) 150 MG/ML IM injection Inject 150 mg into the muscle       naloxone (NARCAN) 4 MG/0.1ML nasal spray Spray 1 spray (4 mg) into one nostril alternating nostrils once as needed for opioid reversal every 2-3 minutes until assistance arrives 0.2 mL 1     ondansetron (ZOFRAN) 8 MG tablet        oxyCODONE (OXYCONTIN) 10 MG 12 hr tablet Take 1 tablet (10 mg) by mouth every 12 hours 60 tablet 0     oxyCODONE IR (ROXICODONE) 15 MG tablet Take 1 tablet (15 mg) by mouth every 6 hours as needed for severe pain 60 tablet 0     sertraline (ZOLOFT) 100 MG tablet Take 2 tablets (200 mg) by mouth daily 180 tablet 3       Physical Examination:  /85   Pulse 119   Resp 18   Wt 75.8 kg (167 lb 1.6 oz)   SpO2 (!) 89%    BMI 28.68 kg/m    Wt Readings from Last 10 Encounters:   04/26/21 75.8 kg (167 lb 1.6 oz)   04/25/21 77.6 kg (171 lb)   04/24/21 77.9 kg (171 lb 11.2 oz)   04/22/21 76.2 kg (168 lb)   04/21/21 76.3 kg (168 lb 3.4 oz)   04/16/21 76.3 kg (168 lb 3.4 oz)   04/16/21 76.3 kg (168 lb 3.2 oz)   04/12/21 75.2 kg (165 lb 11.2 oz)   04/07/21 73.9 kg (163 lb)   04/06/21 74.8 kg (164 lb 12.8 oz)     Constitutional: Ill-appearing female who appears uncomfortable but in no acute distress.  Eyes: EOMI, PERRL. No scleral icterus.  ENT: Oral mucosa is moist without lesions or thrush.   Lymphatic: Neck is supple without cervical or supraclavicular lymphadenopathy.   Cardiovascular: Tachycardic rate and regular rhythm. No murmurs, gallops, or rubs. No peripheral edema.  Respiratory: Clear to auscultation bilaterally. No wheezes or crackles. Tenderness to right ribs.   Gastrointestinal: Bowel sounds present. Abdomen soft, non-tender.   Neurologic: Cranial nerves II through XII are grossly intact. Sequela of prior stroke, stable.   Skin: No rashes, petechiae, or bruising noted on exposed skin.    Laboratory Data:  Results for HIMANSHU AL (MRN [id number]) as of 4/26/2021 15:16   4/26/2021 14:10   Sodium 139   Potassium 3.7   Chloride 108   Carbon Dioxide 22   Urea Nitrogen 7   Creatinine 0.58   GFR Estimate >90   GFR Estimate If Black >90   Calcium 9.2   Anion Gap 9   Albumin 3.9   Protein Total 8.5   Bilirubin Total 4.6 (H)   Alkaline Phosphatase 89    (H)    (H)   Glucose 96   WBC 12.4 (H)   Hemoglobin 7.6 (L)   Hematocrit 20.7 (L)   Platelet Count 335   RBC Count 2.33 (L)   MCV 89   MCH 32.6   MCHC 36.7 (H)   RDW 25.6 (H)   Diff Method Manual Differential   % Neutrophils 62.2   % Lymphocytes 24.3   % Monocytes 3.6   % Eosinophils 7.2   % Basophils 2.7   Nucleated RBCs 8 (H)   Absolute Neutrophil 7.7   Absolute Lymphocytes 3.0   Absolute Monocytes 0.4   Absolute Eosinophils 0.9 (H)   Absolute Basophils 0.3 (H)    Absolute Nucleated RBC 1.0   Anisocytosis Marked   Poikilocytosis Marked   Polychromasia Moderate   RBC Fragments Slight   Sickle Cells Marked   Target Cells Moderate   Platelet Estimate Confirming automated cell count   % Retic 18.5 (H)   Absolute Retic 436.6 (H)       Assessment and Plan:  1. Sickle Cell Disease with Acute Crisis  HgbSS, has been having more issues with pain recently with more ED visits and hospitalizations. She looks extremely uncomfortable today, has been in ED or infusion every day since last week. Labs generally stable though still with elevated retic and LFTs, leukocytosis consistent with pain crisis.     Discussed admission for pain crisis and she is agreeable. Will repeat IV fluids and IV morphine today while waiting for admission. She has pain plan in place for inpatient team.    Also has new hypoxia today, ddx including PE vs acute chest vs splinting from right chest pain. CXR from ED yesterday OK. Cannot do CT PE due to contrast allergy. Asked inpatient team to do VQ study once admitted. Stable on 2-3L O2 in clinic while waiting for bed. Improved with pain medication.     Monitor port for improvement in swelling after placement. No clear signs of infection on exam though limited by gauze dressing.     Continue Hydrea, and Jadenu. Will plan to start Desferral after discharge.      2. Pulm/Vascular  History of asthma, continue Symbicort and Albuterol PRN.      Bilateral PE, diagnosed 2/1/21, was on Xarelto. Then RUE chronic DVGT and SVT, switched to Eliquis. Will need total 3 months of anticoagulation (through May 2021). She had to hold Eliquis for port placement last week and now with new hypoxia and significant rib pain concern for recurrent PE-as above recommend VQ scan inpatient. EKG NSR.    3. Neuro  History of stroke, no new concerns. ASA on hold while on anticoagulation for PE.      4. Psych  History of anxiety and depression. Continue Sertraline.      Did not discuss sickle cell  health maintenance today.     40 minutes spent on the date of the encounter doing chart review, review of test results, interpretation of tests, patient visit and discussion with other provider(s) including seeing patient in lab and infusion, arranging hospital admission, speaking with inpatient MD and RN.     Andrei Machado PA-C  [facility]  [address], [place] [id number] [phone]

## 2021-04-26 NOTE — PROGRESS NOTES
Infusion Nursing Note:  Jennifer Cervantes presents today for IVF -Pain Meds.    Patient seen by provider today: Yes: Andrei Machado PA-C   present during visit today: Not Applicable.    Note: Pt saw provider in the infusion room.  Pt in tears with 10/10 pain in lower back.  Per Andrei, pt will be admitted for further assessment and pain management.  COVID test done. EKG done.    Report given to Melissa Oliver RN    Intravenous Access:  Peripheral IV placed.  PORT is tender so lab placed PIV.    Treatment Conditions:  Lab Results   Component Value Date    HGB 7.6 04/26/2021     Lab Results   Component Value Date    WBC 12.4 04/26/2021      Lab Results   Component Value Date    ANEU 7.7 04/26/2021     Lab Results   Component Value Date     04/26/2021      Lab Results   Component Value Date     04/26/2021                   Lab Results   Component Value Date    POTASSIUM 3.7 04/26/2021           Lab Results   Component Value Date    MAG 1.7 02/21/2021            Lab Results   Component Value Date    CR 0.58 04/26/2021                   Lab Results   Component Value Date    MICAH 9.2 04/26/2021                Lab Results   Component Value Date    BILITOTAL 4.6 04/26/2021           Lab Results   Component Value Date    ALBUMIN 3.9 04/26/2021                    Lab Results   Component Value Date     04/26/2021           Lab Results   Component Value Date     04/26/2021           Post Infusion Assessment:  Patient tolerated infusion without incident.       Discharge Plan:   Patient discharged in stable condition accompanied by: [facility] to hospital.  Departure Mode: ambulance.    Patricia Coronado RN

## 2021-04-26 NOTE — PROGRESS NOTES
EKG ordered by Andrei Machado PA-C for tachycardia. EKG performed, given to RN for evaluation and transmitted to chart.    -Arely GUILLEN CMA

## 2021-04-26 NOTE — LETTER
4/26/2021         RE: Jennifer Cervantes  [address]  [facility] [address]        Dear Colleague,    Thank you for referring your patient, Jennifer Cervantes, to the [facility]. Please see a copy of my visit note below.    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      She was admitted 2/1/21-2/3/21 with a new PE, started on Rivaroxaban. Switched to Eliquis 3/25/21 with RUE DVT.    She has been having issues the last few weeks with increased pain and frequent ED visits. I was asked to see her today for ongoing pain.     Interval History:  Jennifer was seen today for follow-up. She is in tears when I see her 2/2 pain which started up again this morning after being slightly improved yesterday after ED visit. She has severe pain in her right ribs and her entire back. She has tried her home pain meds with no relief. She is taking her Eliquis but did have to hold it on 4/20 and 4/21 for port placement. Her port is still a bit swollen and tender so not being used yet. She otherwise denies fevers, headaches, dizziness, chest pain, SOB, cough, nausea, vomiting, abdominal pain, bowel or bladder concerns, edema. She states the pain in her right ribs is not much different with deep breathing.     Current Outpatient Medications   Medication Sig Dispense Refill     acetaminophen (TYLENOL) 325 MG tablet Take 2 tablets (650 mg) by mouth every 6 hours as needed for mild pain 120 tablet 3      albuterol (PROAIR HFA/PROVENTIL HFA/VENTOLIN HFA) 108 (90 Base) MCG/ACT inhaler Inhale 2 puffs into the lungs every 6 hours as needed for shortness of breath / dyspnea or wheezing 8.5 g 3     albuterol (PROVENTIL) (2.5 MG/3ML) 0.083% neb solution Take 1 vial (2.5 mg) by nebulization every 6 hours as needed for shortness of breath / dyspnea or wheezing 12 mL 4     apixaban ANTICOAGULANT (ELIQUIS) 5 MG tablet Take 1 tablet (5 mg) by mouth 2 times daily 60 tablet 0     ARIPiprazole (ABILIFY) 2 MG tablet Take 1 tablet (2 mg) by mouth daily 30 tablet 3     aspirin (ASPIRIN) 81 MG EC tablet Take 1 tablet (81 mg) by mouth daily . HOLD while on Xarelto 90 tablet 3     budesonide-formoterol (SYMBICORT) 160-4.5 MCG/ACT Inhaler Inhale 2 puffs into the lungs 2 times daily 10.2 g 3     celecoxib (CELEBREX) 100 MG capsule Take 1 capsule (100 mg) by mouth 2 times daily 60 capsule 3     diclofenac (VOLTAREN) 1 % topical gel Apply 4 g topically 4 times daily as needed for moderate pain Apply to back, legs, and arms for pain 150 g 3     diphenhydrAMINE (BENADRYL) 25 MG capsule Take 1-2 capsules (25-50 mg) by mouth nightly as needed for sleep 60 capsule 3     EPINEPHrine (ANY BX GENERIC EQUIV) 0.3 MG/0.3ML injection 2-pack Inject 0.3 mLs (0.3 mg) into the muscle as needed for anaphylaxis 1 each 1     Hydroxyurea 1000 MG TABS Take 2,000 mg by mouth daily 60 tablet 3     JADENU 360 MG tablet Take 4 tablets (1,440 mg) by mouth every evening 30 tablet 0     medroxyPROGESTERone (DEPO-PROVERA) 150 MG/ML IM injection Inject 150 mg into the muscle       naloxone (NARCAN) 4 MG/0.1ML nasal spray Spray 1 spray (4 mg) into one nostril alternating nostrils once as needed for opioid reversal every 2-3 minutes until assistance arrives 0.2 mL 1     ondansetron (ZOFRAN) 8 MG tablet        oxyCODONE (OXYCONTIN) 10 MG 12 hr tablet Take 1 tablet (10 mg) by mouth every 12 hours 60 tablet 0     oxyCODONE IR (ROXICODONE) 15 MG tablet Take 1 tablet (15 mg)  by mouth every 6 hours as needed for severe pain 60 tablet 0     sertraline (ZOLOFT) 100 MG tablet Take 2 tablets (200 mg) by mouth daily 180 tablet 3       Physical Examination:  /85   Pulse 119   Resp 18   Wt 75.8 kg (167 lb 1.6 oz)   SpO2 (!) 89%   BMI 28.68 kg/m    Wt Readings from Last 10 Encounters:   04/26/21 75.8 kg (167 lb 1.6 oz)   04/25/21 77.6 kg (171 lb)   04/24/21 77.9 kg (171 lb 11.2 oz)   04/22/21 76.2 kg (168 lb)   04/21/21 76.3 kg (168 lb 3.4 oz)   04/16/21 76.3 kg (168 lb 3.4 oz)   04/16/21 76.3 kg (168 lb 3.2 oz)   04/12/21 75.2 kg (165 lb 11.2 oz)   04/07/21 73.9 kg (163 lb)   04/06/21 74.8 kg (164 lb 12.8 oz)     Constitutional: Ill-appearing female who appears uncomfortable but in no acute distress.  Eyes: EOMI, PERRL. No scleral icterus.  ENT: Oral mucosa is moist without lesions or thrush.   Lymphatic: Neck is supple without cervical or supraclavicular lymphadenopathy.   Cardiovascular: Tachycardic rate and regular rhythm. No murmurs, gallops, or rubs. No peripheral edema.  Respiratory: Clear to auscultation bilaterally. No wheezes or crackles. Tenderness to right ribs.   Gastrointestinal: Bowel sounds present. Abdomen soft, non-tender.   Neurologic: Cranial nerves II through XII are grossly intact. Sequela of prior stroke, stable.   Skin: No rashes, petechiae, or bruising noted on exposed skin.    Laboratory Data:  Results for HIMANSHU AL (MRN [id number]) as of 4/26/2021 15:16   4/26/2021 14:10   Sodium 139   Potassium 3.7   Chloride 108   Carbon Dioxide 22   Urea Nitrogen 7   Creatinine 0.58   GFR Estimate >90   GFR Estimate If Black >90   Calcium 9.2   Anion Gap 9   Albumin 3.9   Protein Total 8.5   Bilirubin Total 4.6 (H)   Alkaline Phosphatase 89    (H)    (H)   Glucose 96   WBC 12.4 (H)   Hemoglobin 7.6 (L)   Hematocrit 20.7 (L)   Platelet Count 335   RBC Count 2.33 (L)   MCV 89   MCH 32.6   MCHC 36.7 (H)   RDW 25.6 (H)   Diff Method Manual Differential   %  Neutrophils 62.2   % Lymphocytes 24.3   % Monocytes 3.6   % Eosinophils 7.2   % Basophils 2.7   Nucleated RBCs 8 (H)   Absolute Neutrophil 7.7   Absolute Lymphocytes 3.0   Absolute Monocytes 0.4   Absolute Eosinophils 0.9 (H)   Absolute Basophils 0.3 (H)   Absolute Nucleated RBC 1.0   Anisocytosis Marked   Poikilocytosis Marked   Polychromasia Moderate   RBC Fragments Slight   Sickle Cells Marked   Target Cells Moderate   Platelet Estimate Confirming automated cell count   % Retic 18.5 (H)   Absolute Retic 436.6 (H)       Assessment and Plan:  1. Sickle Cell Disease with Acute Crisis  HgbSS, has been having more issues with pain recently with more ED visits and hospitalizations. She looks extremely uncomfortable today, has been in ED or infusion every day since last week. Labs generally stable though still with elevated retic and LFTs, leukocytosis consistent with pain crisis.     Discussed admission for pain crisis and she is agreeable. Will repeat IV fluids and IV morphine today while waiting for admission. She has pain plan in place for inpatient team.    Also has new hypoxia today, ddx including PE vs acute chest vs splinting from right chest pain. CXR from ED yesterday OK. Cannot do CT PE due to contrast allergy. Asked inpatient team to do VQ study once admitted. Stable on 2-3L O2 in clinic while waiting for bed. Improved with pain medication.     Monitor port for improvement in swelling after placement. No clear signs of infection on exam though limited by gauze dressing.     Continue Hydrea, and Jadenu. Will plan to start Desferral after discharge.      2. Pulm/Vascular  History of asthma, continue Symbicort and Albuterol PRN.      Bilateral PE, diagnosed 2/1/21, was on Xarelto. Then RUE chronic DVGT and SVT, switched to Eliquis. Will need total 3 months of anticoagulation (through May 2021). She had to hold Eliquis for port placement last week and now with new hypoxia and significant rib pain concern for  recurrent PE-as above recommend VQ scan inpatient. EKG NSR.    3. Neuro  History of stroke, no new concerns. ASA on hold while on anticoagulation for PE.      4. Psych  History of anxiety and depression. Continue Sertraline.      Did not discuss sickle cell health maintenance today.     40 minutes spent on the date of the encounter doing chart review, review of test results, interpretation of tests, patient visit and discussion with other provider(s) including seeing patient in lab and infusion, arranging hospital admission, speaking with inpatient MD and RN.     Andrei Machado PA-C  [facility]  [address] [place] [id number] [phone]        Again, thank you for allowing me to participate in the care of your patient.        Sincerely,        RONALDO Allan

## 2021-04-26 NOTE — PROGRESS NOTES
Social Work Follow-Up  [facility]    Data/Intervention:  Patient Name:  Jennifer Cervantes  /Age:  2000 (21 year old)    Reason for Follow-Up:  Transportation Assistance    Collaborated With:    -Pt  -[facility]    Intervention/Education/Resources Provided:  SW received page from aaron Patel RN about assisting with rides for pt's 2 pm appointment today. SW called pt's insurance and set up ride through transportation plus with a will call return ride. SW called pt and relayed ride information, pt verbalized understanding.     Assessment/Plan:  SW will remain available as needed.  Previously provided patient/family with writer's contact information and availability.       CARLOS Chavez,LGSW  Hematology/Oncology Social Worker  Phone:[phone] Pager: [phone]

## 2021-04-27 ENCOUNTER — APPOINTMENT (OUTPATIENT)
Dept: NUCLEAR MEDICINE | Facility: CLINIC | Age: 21
DRG: 811 | End: 2021-04-27
Attending: PHYSICIAN ASSISTANT
Payer: COMMERCIAL

## 2021-04-27 LAB
LABORATORY COMMENT REPORT: NORMAL
RADIOLOGIST FLAGS: ABNORMAL
SARS-COV-2 RNA RESP QL NAA+PROBE: NEGATIVE
SPECIMEN SOURCE: NORMAL

## 2021-04-27 PROCEDURE — 99233 SBSQ HOSP IP/OBS HIGH 50: CPT | Performed by: STUDENT IN AN ORGANIZED HEALTH CARE EDUCATION/TRAINING PROGRAM

## 2021-04-27 PROCEDURE — 78830 RP LOCLZJ TUM SPECT W/CT 1: CPT

## 2021-04-27 PROCEDURE — 78580 LUNG PERFUSION IMAGING: CPT | Mod: 26

## 2021-04-27 PROCEDURE — 250N000013 HC RX MED GY IP 250 OP 250 PS 637: Performed by: PHYSICIAN ASSISTANT

## 2021-04-27 PROCEDURE — 250N000011 HC RX IP 250 OP 636: Performed by: PHYSICIAN ASSISTANT

## 2021-04-27 PROCEDURE — 120N000002 HC R&B MED SURG/OB UMMC

## 2021-04-27 PROCEDURE — A9540 TC99M MAA: HCPCS | Performed by: STUDENT IN AN ORGANIZED HEALTH CARE EDUCATION/TRAINING PROGRAM

## 2021-04-27 PROCEDURE — 99207 PR APP CREDIT; MD BILLING SHARED VISIT: CPT | Performed by: PHYSICIAN ASSISTANT

## 2021-04-27 PROCEDURE — 99223 1ST HOSP IP/OBS HIGH 75: CPT | Mod: GC | Performed by: INTERNAL MEDICINE

## 2021-04-27 PROCEDURE — 343N000001 HC RX 343: Performed by: STUDENT IN AN ORGANIZED HEALTH CARE EDUCATION/TRAINING PROGRAM

## 2021-04-27 RX ADMIN — KIT FOR THE PREPARATION OF TECHNETIUM TC 99M ALBUMIN AGGREGATED 6.2 MILLICURIE: 2.5 INJECTION, POWDER, FOR SOLUTION INTRAVENOUS at 13:05

## 2021-04-27 RX ADMIN — APIXABAN 5 MG: 5 TABLET, FILM COATED ORAL at 19:47

## 2021-04-27 RX ADMIN — Medication: at 09:04

## 2021-04-27 RX ADMIN — CELECOXIB 100 MG: 100 CAPSULE ORAL at 09:01

## 2021-04-27 RX ADMIN — CELECOXIB 100 MG: 100 CAPSULE ORAL at 19:47

## 2021-04-27 RX ADMIN — HYDROXYUREA 2000 MG: 500 CAPSULE ORAL at 09:01

## 2021-04-27 RX ADMIN — SERTRALINE HYDROCHLORIDE 200 MG: 100 TABLET ORAL at 09:02

## 2021-04-27 RX ADMIN — APIXABAN 5 MG: 5 TABLET, FILM COATED ORAL at 09:00

## 2021-04-27 RX ADMIN — DEFERASIROX 1440 MG: 360 TABLET ORAL at 20:53

## 2021-04-27 ASSESSMENT — ACTIVITIES OF DAILY LIVING (ADL)
ADLS_ACUITY_SCORE: 14
DEPENDENT_IADLS:: CLEANING;COOKING;SHOPPING;TRANSPORTATION
ADLS_ACUITY_SCORE: 14
ADLS_ACUITY_SCORE: 14

## 2021-04-27 NOTE — PROGRESS NOTES
This is a recent snapshot of the patient's [facility] medical record.  For current drug dose and complete information and questions, call [phone] or In Basket pool, fv home infusion ([id number])  CSN Number:  [id number]

## 2021-04-27 NOTE — CONSULTS
Hematology Consult Note   Date of Service: 04/27/2021    Patient: Jennifer Cervantes  MRN: [id number]  Admission Date: 4/26/2021  Hospital Day # 1   Primary Outpatient Hematologist: Dr. 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     # Sickle cell pain crisis: improving with current pain regimen.   - daily CBC with retic count  - continue pain regimen as below:     Inpatient:  ? Opioid: Morphine 2 mg IV Q1H PRN until PCA starts  ? PCA plan:     PCA button dose: Morphine 1 mg    Lockout: 20 minutes    Continuous Infusion: consider 1 mg/hr    One hr max: 4 mg  ? Other Medications: Benadryl, Zofran  ? If PCA not working, she Has had success with ketamine starting at 4mg/h and advancing only to 6mg/h, as 8mg/h made her feel quite poorly.  ? ASA on hold (ok to give celebrex)  ? Supportive Care: Docusate, Senna  Chronic Pain Medications:                          Home regimen Oxycontin 10 mg q12h, oxycodone 10 mg p.o. q.6 hours p.r.n. breakthrough pain.  She also continues on Celebrex, and Zoloft among other medications.                           # Worsening PE  - she is  found to have bilateral subsegmental perfusion defects, left greater  than right, consistent with pulmonary emboli. Overall findings have  worsened since the comparison perfusion scan on 2/1/2021, particularly  within the left lung.   - She was admitted 2/1/21-2/3/21 with a new PE, started on Rivaroxaban, later switched to Eliquis 3/25/21 with  finding of RUE DVT (? rivaroxaban failure). She only held Eliquis for 1 day for Left Port Removal and Left Port Placement on 4/21.   - I asked about how she was taking her Eliquis she states that she was taking it around 7-8am and at night. When further asked whether she was taking it Q12h she felt that I was questioning her compliance and got very mad and refused to talk more.   - since her new PE could due to the skipped doses of Eliquis, ok to continue Eliquis now.        Recommendations:   - continue pain plan  - continue Eliquis  - could check Eliquis level after 3-4 doses     Patient was seen and plan of care was discussed with attending physician Dr. Hernandez.    We will continue to follow up with you. Please don't hesitate to contact the Fellow On-Call with questions.    Juliette Miles MD, PhD  Hematology/Oncology   Pager: [phone]      HEMATOLOGY STAFF:  Seen with fellow, whose note reflects our joint evaluation, assessment, and plan.      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 She is currently on Hydrea and Jadenu with plan to add Desferal due to significant iron overload. She is admitted with multiple joint pain and RUQ pain for 1 week, triggered by stress and cold weather. She denies any fever, chills, N/V, constipation, dysuria, SOB, cough. Since admission she is managed per her established treatment plan with improvement of her pain.     She also has history of PE/DVT.   - She was admitted  2/1/21-2/3/21 with a new PE, started on Rivaroxaban, switched to Eliquis 3/25/21 due to discovery of a RUE DVT (? rivaroxaban failure). She only held Eliquis for 1 day for Left Port Removal and Left Port Placement on 4/21.   - she had hypoxia needing 2-3L NC    She currently lives in a hotel but is not willing to talk to anybody else about this other than Dr. Duncan.     Hematologic History:  See treatment plan    Review of Systems: Pertinent positive and negative systems described in HPI; the remainder of the 14 systems are negative    Past Medical History:  Past Medical History:   Diagnosis Date     Anemia      Anxiety      Bleeding disorder (H)      Blood clotting disorder (H)      Cerebral infarction (H) 2015     Cognitive developmental delay     low IQ. 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 (H)     right hand contractures     Iron overload due to repeated red blood cell transfusions      Migraines      Multiple subsegmental pulmonary emboli without acute cor pulmonale (H) 02/01/2021     Oppositional defiant behavior      Superficial venous thrombosis of arm, right 03/25/2021     Uncomplicated asthma        Past Surgical History:  Past Surgical History:   Procedure Laterality Date     AS INSERT TUNNELED CV 2 CATH W/O PORT/PUMP       C BREAST REDUCTION (INCLUDES LIPO) TIER 3 Bilateral 04/23/2019     CHOLECYSTECTOMY       INSERT PORT VASCULAR ACCESS Left 4/21/2021    Procedure: INSERTION, VASCULAR ACCESS PORT (NOT SURE ON SIDE UNTIL REMOVAL);  Surgeon: Rajan More MD;  Location: [facility] OR     IR CHEST PORT PLACEMENT > 5 YRS OF AGE  4/21/2021     IR CVC NON TUNNEL PLACEMENT  04/07/2020     IR PORT REMOVAL LEFT  4/21/2021     REMOVE PORT VASCULAR ACCESS Left 4/21/2021    Procedure: REMOVAL, VASCULAR ACCESS PORT LEFT;  Surgeon: Rajan More MD;  Location: [facility] OR     REPAIR TENDON  "ELBOW Right 10/02/2019    Procedure: Right Elbow Flexor Lengthening, Flexor Pronator Slide Of Wrist and Finger, Thumb Adductor Lengthening;  Surgeon: Anai Franco MD;  Location: [facility] OR     TONSILLECTOMY Bilateral 10/02/2019    Procedure: Bilateral Tonsillectomy;  Surgeon: Farhana Guy MD;  Location: [facility] OR       Social History:  Social History     Socioeconomic History     Marital status: Single     Spouse name: Not on file     Number of children: Not on file     Years of education: Not on file     Highest education level: Not on file   Occupational History     Not on file   Social Needs     Financial resource strain: Not on file     Food insecurity     Worry: Not on file     Inability: Not on file     Transportation needs     Medical: Not on file     Non-medical: Not on file   Tobacco Use     Smoking status: Never Smoker     Smokeless tobacco: Never Used   Substance and Sexual Activity     Alcohol use: Not Currently     Alcohol/week: 0.0 standard drinks     Drug use: Never     Sexual activity: Not Currently     Partners: Male     Birth control/protection: Other   Lifestyle     Physical activity     Days per week: Not on file     Minutes per session: Not on file     Stress: Not on file   Relationships     Social connections     Talks on phone: Not on file     Gets together: Not on file     Attends Sikh service: Not on file     Active member of club or organization: Not on file     Attends meetings of clubs or organizations: Not on file     Relationship status: Not on file     Intimate partner violence     Fear of current or ex partner: Not on file     Emotionally abused: Not on file     Physically abused: Not on file     Forced sexual activity: Not on file   Other Topics Concern     Parent/sibling w/ CABG, MI or angioplasty before 65F 55M? Not Asked   Social History Narrative    Lives with mom and extended family but \"toxic environment\" per her report. She would like to move out but " cannot financially do so. She has minimal support at home despite her significant SCD comorbidities and cognitive delay from stroke.        Family History  Family History   Problem Relation Age of Onset     Sickle Cell Trait Mother      Hypertension Mother      Asthma Mother      Sickle Cell Trait Father        Outpatient Medications:  No current facility-administered medications on file prior to encounter.   acetaminophen (TYLENOL) 325 MG tablet, Take 2 tablets (650 mg) by mouth every 6 hours as needed for mild pain  albuterol (PROAIR HFA/PROVENTIL HFA/VENTOLIN HFA) 108 (90 Base) MCG/ACT inhaler, Inhale 2 puffs into the lungs every 6 hours as needed for shortness of breath / dyspnea or wheezing  albuterol (PROVENTIL) (2.5 MG/3ML) 0.083% neb solution, Take 1 vial (2.5 mg) by nebulization every 6 hours as needed for shortness of breath / dyspnea or wheezing  apixaban ANTICOAGULANT (ELIQUIS) 5 MG tablet, Take 1 tablet (5 mg) by mouth 2 times daily  ARIPiprazole (ABILIFY) 2 MG tablet, Take 1 tablet (2 mg) by mouth daily  aspirin (ASPIRIN) 81 MG EC tablet, Take 1 tablet (81 mg) by mouth daily . HOLD while on Xarelto  budesonide-formoterol (SYMBICORT) 160-4.5 MCG/ACT Inhaler, Inhale 2 puffs into the lungs 2 times daily  celecoxib (CELEBREX) 100 MG capsule, Take 1 capsule (100 mg) by mouth 2 times daily  diclofenac (VOLTAREN) 1 % topical gel, Apply 4 g topically 4 times daily as needed for moderate pain Apply to back, legs, and arms for pain  diphenhydrAMINE (BENADRYL) 25 MG capsule, Take 1-2 capsules (25-50 mg) by mouth nightly as needed for sleep  EPINEPHrine (ANY BX GENERIC EQUIV) 0.3 MG/0.3ML injection 2-pack, Inject 0.3 mLs (0.3 mg) into the muscle as needed for anaphylaxis  Hydroxyurea 1000 MG TABS, Take 2,000 mg by mouth daily  JADENU 360 MG tablet, Take 4 tablets (1,440 mg) by mouth every evening  medroxyPROGESTERone (DEPO-PROVERA) 150 MG/ML IM injection, Inject 150 mg into the muscle  naloxone (NARCAN) 4 MG/0.1ML  nasal spray, Spray 1 spray (4 mg) into one nostril alternating nostrils once as needed for opioid reversal every 2-3 minutes until assistance arrives  ondansetron (ZOFRAN) 8 MG tablet,   oxyCODONE (OXYCONTIN) 10 MG 12 hr tablet, Take 1 tablet (10 mg) by mouth every 12 hours  oxyCODONE IR (ROXICODONE) 15 MG tablet, Take 1 tablet (15 mg) by mouth every 6 hours as needed for severe pain  sertraline (ZOLOFT) 100 MG tablet, Take 2 tablets (200 mg) by mouth daily         Physical Exam:    /64 (BP Location: Left arm)   Pulse 96   Temp 96.8  F (36  C) (Oral)   Resp 16   SpO2 99%   Gen: NAD, lying on bed  CV: Normal rate, regular rhythm. No m/r/g  Pulm: CTAB, no wheezing, normal work of breathing  Abd: Soft, nt/nd, no rebound/guarding  Ext: Warm and well perfused. No lower extremity edema  Skin: No rash, cyanosis or petechial lesion  Neuro: Alert and answering questions appropriately.     Labs & Studies: I personally reviewed the following studies:  ROUTINE LABS (Last four results):  CMP  Recent Labs   Lab 04/26/21  1410 04/22/21  0019    138   POTASSIUM 3.7 3.5   CHLORIDE 108 110*   CO2 22 22   ANIONGAP 9 6   GLC 96 105*   BUN 7 9   CR 0.58 0.59   GFRESTIMATED >90 >90   GFRESTBLACK >90 >90   MICAH 9.2 8.4*   PROTTOTAL 8.5 7.8   ALBUMIN 3.9 3.7   BILITOTAL 4.6* 3.3*   ALKPHOS 89 84   * 145*   * 144*     CBC  Recent Labs   Lab 04/26/21  1410 04/25/21  1300 04/24/21  1534 04/22/21  0019   WBC 12.4* 12.4* 10.1 13.7*   RBC 2.33* 2.14* 2.37* 2.34*   HGB 7.6* 6.8* 7.8* 7.9*   HCT 20.7* 19.3* 22.2* 21.5*   MCV 89 90 94 92   MCH 32.6 31.8 32.9 33.8*   MCHC 36.7* 35.2 35.1 36.7*   RDW 25.6* 24.3* 22.5* 23.9*    329 342 337     INRNo lab results found in last 7 days.    V/Q scan on 4/27/21:   Impression: Bilateral subsegmental perfusion defects, left greater  than right, consistent with pulmonary emboli. Overall findings have  worsened since the comparison perfusion scan on 2/1/2021,  particularly  within the left lung.

## 2021-04-27 NOTE — PROGRESS NOTES
[facility]    Medicine Progress Note - Hospitalist Service, Gold        Date of Admission:  4/26/2021  Assessment & Plan         Jennifer Cervantes is a 21 year old female with a past medical history of sickle cell anemia, CVA with residual RUE hemiparesis, iron overload 2/2 frequent transfusions, asthma, depression and anxiety who presents with worsening pain concerning for an acute sickle cell pain crisis.       Acute Sickle Cell Pain Crisis  -Hematology consulted  -Start morphine PCA per pain plan              Continuous rate: 1mg/hr              PCA dose: 1mg               PCA lockout: 20 minutes              1 hour limit: 4mg   -Continue hydroxyurea 2000mg daily    History of iron overload  -Continue home regimen of deferasirox 1440mg daily      Hx of PE, RUE DVT, on chronic anticoagulation  -Continue home Eliquis 5mg twice daily     Hx of CVA (2015) with residual RUE deficits   -Able to independently care for herself. No acute issues.      Depression/Anxiety  -No acute issues since admission. Continue home Zoloft 200mg daily and Abilify 2mg daily.     Asthma  -No acute issues since admission. Continue home Breo Ellipta and albuterol        Diet:  Regular diet     Diet: Combination Diet Regular Diet Adult    DVT Prophylaxis: Eliquis  Lopez Catheter: not present  Code Status: Full Code           Disposition Plan   Expected discharge: from home, will discharge back to home in 3-4 days once able to wean back to oral pain medications.  Entered: RONALDO Kaur 04/27/2021, 2:20 PM       RONALDO Kaur  Hospitalist Service, [address]  Contact information available via [facility] Paging/Directory  Please see sign in/sign out for up to date coverage information  ______________________________________________________________________    Interval History   Ms. Cervantes was resting in bed this morning reporting that her  pain is better controlled but she is still needing her IV medication routinely. We discussed transitioning to her pain plan morphine pca which she was open to. She denies any coughing, fevers, chills or additional new concerns at this time.     Data reviewed today: I reviewed all medications, new labs and imaging results over the last 24 hours. .    Physical Exam   Vital Signs: Temp: 97  F (36.1  C) Temp src: Oral BP: 103/55 Pulse: 95   Resp: 16 SpO2: 98 % O2 Device: Nasal cannula Oxygen Delivery: 2.5 LPM  Weight: 0 lbs 0 oz  General Appearance: 21 year old female resting in bed, reports pain 7/10 over whole body, pleasant in conversation  Respiratory: breathing comfortably on 2.5L of oxygen, no adventitious sounds to bilateral auscultation  Cardiovascular: regular rate and rhythm, no appreciable murmurs, rubs or gallops  GI: faint bowel sounds present, soft, minimally tender throughout, no rebound or guarding  Skin: warm, dry, no open sores, lesions or ulcerations      Data   Recent Labs   Lab 04/26/21  1410 04/25/21  1300 04/24/21  1534 04/22/21  0019   WBC 12.4* 12.4* 10.1 13.7*   HGB 7.6* 6.8* 7.8* 7.9*   MCV 89 90 94 92    329 342 337     --   --  138   POTASSIUM 3.7  --   --  3.5   CHLORIDE 108  --   --  110*   CO2 22  --   --  22   BUN 7  --   --  9   CR 0.58  --   --  0.59   ANIONGAP 9  --   --  6   MICAH 9.2  --   --  8.4*   GLC 96  --   --  105*   ALBUMIN 3.9  --   --  3.7   PROTTOTAL 8.5  --   --  7.8   BILITOTAL 4.6*  --   --  3.3*   ALKPHOS 89  --   --  84   *  --   --  144*   *  --   --  145*   TROPI  --   --   --  <0.015

## 2021-04-27 NOTE — PROGRESS NOTES
This is a recent snapshot of the patient's [facility] medical record.  For current drug dose and complete information and questions, call [phone] or In Basket pool, [facility]v home infusion ([id number])  CSN Number:  [id number]

## 2021-04-27 NOTE — PLAN OF CARE
/55 (BP Location: Left arm)   Pulse 95   Temp 97  F (36.1  C) (Oral)   Resp 16   SpO2 98%     A&Ox4, VSS except HR fluctuating between . Patient currently on 2.5L O2 via NC, stating at 97%. Continue to wean as tolerated. Lung Scan V and P performed this afternoon, tolerated well. IR called writer to verbalize there may be a potential bilateral PE. Writer paged providers and have not heard back. Patient has been sleeping in bed majority of shift. Up with Ax1 to bedside commode, UO 1,200 mL. Writer encouraged fluid intake d/t dark colored urine. 100% of both breakfast and lunch ate, chicken noodle soup requested. Continue to check for new orders in regards to PE. Continue to monitor.

## 2021-04-27 NOTE — PLAN OF CARE
Assumed care of Patient from [id number]. VSS. Patient reports good pain control with the morphine PCA. No complaints of nausea overnight. Able to eat a couple chicken noodle soups overnight. Patient reports passing flatus.  Pt voiding spontaneously, ml's were missed due to pt missing hat. Up in room with assist of 1. Pt able to self reposition in bed. Will continue plan of care.

## 2021-04-27 NOTE — PROVIDER NOTIFICATION
All 2000 medications on MAR pt stated already taking before admission. RN charted against and also paged sx and called pharmacy for notification. Pg'd sx for deferasirox home medication, pt stated took during the evening before admission. Pt states she does not have anyone at home that can bring home medication for tomorrows administration. Awaiting further instruction from sx. Will continue POC

## 2021-04-27 NOTE — CONSULTS
Care Management Initial Consult    General Information  Assessment completed with: VM-chart review,    Type of CM/SW Visit: Initial Assessment    Primary Care Provider verified and updated as needed: Yes   Readmission within the last 30 days: no previous admission in last 30 days. Pt has had numerous ER visits in the past month but was not admitted.  Reason for Consult: (elevated risk score)  Advance Care Planning: Advance Care Planning Reviewed: no concerns identified       Communication Assessment  Patient's communication style: spoken language (English or Bilingual)    Hearing Difficulty or Deaf: no   Wear Glasses or Blind: no    Cognitive  Cognitive/Neuro/Behavioral: WDL                      Living Environment:   People in home: parent (mom; Rohini Cervantes), sibling(s)   Current living Arrangements: house      Able to return to prior arrangements: yes    Family/Social Support:  Care provided by: parent(s)  Provides care for: no one  Marital Status: Single  Parent(s)          Description of Support System: Supportive, Involved    Support Assessment: Adequate family and caregiver support    Current Resources:   Patient receiving home care services: No  Community Resources: Pt is on a CADI waiver and is receiving PCA services. Pt's mother is PCA/ILS worker ([employer] [facility])  Equipment currently used at home: none  Supplies currently used at home: None    Employment/Financial:  Employment Status: disabled     Financial Concerns: No concerns identified   Referral to Financial Counselor: No    Lifestyle & Psychosocial Needs:     Socioeconomic History     Marital status: Single     Spouse name: Not on file     Number of children: Not on file     Years of education: Not on file     Highest education level: Not on file     Tobacco Use     Smoking status: Never Smoker     Smokeless tobacco: Never Used   Substance and Sexual Activity     Alcohol use: Not Currently     Alcohol/week: 0.0 standard drinks     Drug  use: Never     Sexual activity: Not Currently     Partners: Male     Birth control/protection: Other     Functional Status:  Prior to admission patient needed assistance:   Dependent ADLs:: Bathing, Dressing, Grooming  Dependent IADLs:: Cleaning, Cooking, Shopping, Transportation  Assesssment of Functional Status: At functional baseline    Mental Health Status:  Mental Health Status: No Current Concerns       Chemical Dependency Status:  Chemical Dependency Status: No Current Concerns       Values/Beliefs:  Spiritual, Cultural Beliefs, Church Practices, Values that affect care: no         Additional Information:  SW attempted to meet with pt x3 to complete assessment. SW completed CMA through chart review as pt's last CMA was completed in march 2021.     SW will continue to follow should any needs arise.     CARLOS Myrick, [facility]  [facility]   [facility]  Phone: [phone]  Pager: [phone]

## 2021-04-27 NOTE — PROGRESS NOTES
Admitted/transferred from: Direct Admit  2 RN full   skin assessment completed by Sammi Tejeda RN and Uyen MENDOZA RN.  Skin assessment finding: skin intact, no problems   Interventions/actions: other NA     Will continue to monitor.

## 2021-04-28 ENCOUNTER — APPOINTMENT (OUTPATIENT)
Dept: GENERAL RADIOLOGY | Facility: CLINIC | Age: 21
DRG: 811 | End: 2021-04-28
Attending: PHYSICIAN ASSISTANT
Payer: COMMERCIAL

## 2021-04-28 LAB
ANION GAP SERPL CALCULATED.3IONS-SCNC: 6 MMOL/L (ref 3–14)
BASE EXCESS BLDV CALC-SCNC: 1.9 MMOL/L
BUN SERPL-MCNC: 5 MG/DL (ref 7–30)
CALCIUM SERPL-MCNC: 8.6 MG/DL (ref 8.5–10.1)
CHLORIDE SERPL-SCNC: 110 MMOL/L (ref 94–109)
CO2 SERPL-SCNC: 25 MMOL/L (ref 20–32)
CREAT SERPL-MCNC: 0.54 MG/DL (ref 0.52–1.04)
CRP SERPL-MCNC: 32 MG/L (ref 0–8)
ERYTHROCYTE [DISTWIDTH] IN BLOOD BY AUTOMATED COUNT: 24.6 % (ref 10–15)
ERYTHROCYTE [DISTWIDTH] IN BLOOD BY AUTOMATED COUNT: NORMAL % (ref 10–15)
GFR SERPL CREATININE-BSD FRML MDRD: >90 ML/MIN/{1.73_M2}
GLUCOSE SERPL-MCNC: 91 MG/DL (ref 70–99)
HCO3 BLDV-SCNC: 27 MMOL/L (ref 21–28)
HCT VFR BLD AUTO: 21 % (ref 35–47)
HCT VFR BLD AUTO: NORMAL % (ref 35–47)
HGB BLD-MCNC: 7.4 G/DL (ref 11.7–15.7)
HGB BLD-MCNC: NORMAL G/DL (ref 11.7–15.7)
INTERPRETATION ECG - MUSE: NORMAL
LACTATE BLD-SCNC: 0.6 MMOL/L (ref 0.7–2)
LACTATE BLD-SCNC: 1.1 MMOL/L (ref 0.7–2)
MCH RBC QN AUTO: 33 PG (ref 26.5–33)
MCH RBC QN AUTO: NORMAL PG (ref 26.5–33)
MCHC RBC AUTO-ENTMCNC: 35.2 G/DL (ref 31.5–36.5)
MCHC RBC AUTO-ENTMCNC: NORMAL G/DL (ref 31.5–36.5)
MCV RBC AUTO: 94 FL (ref 78–100)
MCV RBC AUTO: NORMAL FL (ref 78–100)
O2/TOTAL GAS SETTING VFR VENT: ABNORMAL %
PCO2 BLDV: 41 MM HG (ref 40–50)
PH BLDV: 7.42 PH (ref 7.32–7.43)
PLATELET # BLD AUTO: 280 10E9/L (ref 150–450)
PLATELET # BLD AUTO: NORMAL 10E9/L (ref 150–450)
PO2 BLDV: 56 MM HG (ref 25–47)
POTASSIUM SERPL-SCNC: 4.2 MMOL/L (ref 3.4–5.3)
PROCALCITONIN SERPL-MCNC: 0.13 NG/ML
RBC # BLD AUTO: 2.24 10E12/L (ref 3.8–5.2)
RBC # BLD AUTO: NORMAL 10E12/L (ref 3.8–5.2)
RETICS # AUTO: 529.6 10E9/L (ref 25–95)
RETICS # AUTO: NORMAL 10E9/L (ref 25–95)
RETICS/RBC NFR AUTO: 23.8 % (ref 0.5–2)
RETICS/RBC NFR AUTO: NORMAL % (ref 0.5–2)
SODIUM SERPL-SCNC: 140 MMOL/L (ref 133–144)
TROPONIN I SERPL-MCNC: <0.015 UG/L (ref 0–0.04)
WBC # BLD AUTO: 12.9 10E9/L (ref 4–11)
WBC # BLD AUTO: NORMAL 10E9/L (ref 4–11)

## 2021-04-28 PROCEDURE — 71045 X-RAY EXAM CHEST 1 VIEW: CPT

## 2021-04-28 PROCEDURE — 85045 AUTOMATED RETICULOCYTE COUNT: CPT | Performed by: PHYSICIAN ASSISTANT

## 2021-04-28 PROCEDURE — 83605 ASSAY OF LACTIC ACID: CPT | Performed by: PHYSICIAN ASSISTANT

## 2021-04-28 PROCEDURE — 250N000011 HC RX IP 250 OP 636: Performed by: NURSE PRACTITIONER

## 2021-04-28 PROCEDURE — 93005 ELECTROCARDIOGRAM TRACING: CPT

## 2021-04-28 PROCEDURE — 999N000127 HC STATISTIC PERIPHERAL IV START W US GUIDANCE

## 2021-04-28 PROCEDURE — 250N000013 HC RX MED GY IP 250 OP 250 PS 637: Performed by: PHYSICIAN ASSISTANT

## 2021-04-28 PROCEDURE — 36415 COLL VENOUS BLD VENIPUNCTURE: CPT | Performed by: PHYSICIAN ASSISTANT

## 2021-04-28 PROCEDURE — 85027 COMPLETE CBC AUTOMATED: CPT | Performed by: STUDENT IN AN ORGANIZED HEALTH CARE EDUCATION/TRAINING PROGRAM

## 2021-04-28 PROCEDURE — 85045 AUTOMATED RETICULOCYTE COUNT: CPT | Performed by: STUDENT IN AN ORGANIZED HEALTH CARE EDUCATION/TRAINING PROGRAM

## 2021-04-28 PROCEDURE — 82803 BLOOD GASES ANY COMBINATION: CPT | Performed by: PHYSICIAN ASSISTANT

## 2021-04-28 PROCEDURE — 36415 COLL VENOUS BLD VENIPUNCTURE: CPT | Performed by: STUDENT IN AN ORGANIZED HEALTH CARE EDUCATION/TRAINING PROGRAM

## 2021-04-28 PROCEDURE — 87040 BLOOD CULTURE FOR BACTERIA: CPT | Performed by: NURSE PRACTITIONER

## 2021-04-28 PROCEDURE — 80048 BASIC METABOLIC PNL TOTAL CA: CPT | Performed by: PHYSICIAN ASSISTANT

## 2021-04-28 PROCEDURE — 71045 X-RAY EXAM CHEST 1 VIEW: CPT | Mod: 26 | Performed by: RADIOLOGY

## 2021-04-28 PROCEDURE — 84145 PROCALCITONIN (PCT): CPT | Performed by: NURSE PRACTITIONER

## 2021-04-28 PROCEDURE — 258N000003 HC RX IP 258 OP 636: Performed by: NURSE PRACTITIONER

## 2021-04-28 PROCEDURE — 99207 PR APP CREDIT; MD BILLING SHARED VISIT: CPT | Performed by: PHYSICIAN ASSISTANT

## 2021-04-28 PROCEDURE — 99233 SBSQ HOSP IP/OBS HIGH 50: CPT | Performed by: STUDENT IN AN ORGANIZED HEALTH CARE EDUCATION/TRAINING PROGRAM

## 2021-04-28 PROCEDURE — 120N000002 HC R&B MED SURG/OB UMMC

## 2021-04-28 PROCEDURE — 86140 C-REACTIVE PROTEIN: CPT | Performed by: NURSE PRACTITIONER

## 2021-04-28 PROCEDURE — 84484 ASSAY OF TROPONIN QUANT: CPT | Performed by: NURSE PRACTITIONER

## 2021-04-28 PROCEDURE — 93010 ELECTROCARDIOGRAM REPORT: CPT | Mod: 76 | Performed by: INTERNAL MEDICINE

## 2021-04-28 RX ORDER — CEFTRIAXONE 1 G/1
1 INJECTION, POWDER, FOR SOLUTION INTRAMUSCULAR; INTRAVENOUS EVERY 24 HOURS
Status: DISCONTINUED | OUTPATIENT
Start: 2021-04-28 | End: 2021-05-03

## 2021-04-28 RX ORDER — LIDOCAINE 4 G/G
1 PATCH TOPICAL
Status: DISCONTINUED | OUTPATIENT
Start: 2021-04-28 | End: 2021-05-11 | Stop reason: HOSPADM

## 2021-04-28 RX ORDER — MORPHINE SULFATE 2 MG/ML
1 INJECTION, SOLUTION INTRAMUSCULAR; INTRAVENOUS
Status: DISCONTINUED | OUTPATIENT
Start: 2021-04-28 | End: 2021-04-29

## 2021-04-28 RX ADMIN — CEFTRIAXONE 1 G: 1 INJECTION, POWDER, FOR SOLUTION INTRAMUSCULAR; INTRAVENOUS at 18:46

## 2021-04-28 RX ADMIN — CELECOXIB 100 MG: 100 CAPSULE ORAL at 09:38

## 2021-04-28 RX ADMIN — APIXABAN 5 MG: 5 TABLET, FILM COATED ORAL at 09:38

## 2021-04-28 RX ADMIN — LIDOCAINE 1 PATCH: 560 PATCH PERCUTANEOUS; TOPICAL; TRANSDERMAL at 19:37

## 2021-04-28 RX ADMIN — ACETAMINOPHEN 650 MG: 325 TABLET, FILM COATED ORAL at 18:48

## 2021-04-28 RX ADMIN — HYDROXYUREA 2000 MG: 500 CAPSULE ORAL at 09:39

## 2021-04-28 RX ADMIN — DOCUSATE SODIUM 50 MG AND SENNOSIDES 8.6 MG 1 TABLET: 8.6; 5 TABLET, FILM COATED ORAL at 19:35

## 2021-04-28 RX ADMIN — AZITHROMYCIN MONOHYDRATE 500 MG: 500 INJECTION, POWDER, LYOPHILIZED, FOR SOLUTION INTRAVENOUS at 19:33

## 2021-04-28 RX ADMIN — APIXABAN 5 MG: 5 TABLET, FILM COATED ORAL at 19:35

## 2021-04-28 ASSESSMENT — ACTIVITIES OF DAILY LIVING (ADL)
ADLS_ACUITY_SCORE: 14

## 2021-04-28 NOTE — PROGRESS NOTES
Rapid Response Team Note    Assessment   In assessment a rapid response was called on Jennifer Cervantes due to lactic acidosis. This presentation is likely due to sickle cell crisis and worsened by pulmonary emboli (on AC), anemia with iron overload, acute hypoxia  Asthma.     Plan    Chest pain:  -EKG with precordial t-wave inversions, repeat EKG in 1 hr  - trop pending, prior are flat  Hypoxia:  -  Acute hypoxia: improved but keep acute chest in mind  - Abx: Azithro/ceftriaxone  - pain mng: increased morphine PCA bumps 1mg q20--> 1.2mg q20 and continue continuous 1mg/hr rate  - x 1dose morphine in case pain not controlled with restarting paused PCA (IV blew)  Lactic acidosis:  - blood cx x 2, UA  - reviewed CXR and may have early RLL pneumonia, Abx as per above  -  The internal med swing primary team was able to be reached and they are in agreement with the above plan. Also at the bedside  -  Disposition: The patient will remain on the current unit. We will continue to monitor this patient closely.  -  Reassessment and plan follow-up will be performed by the rapid response team      Addendum: follow up trop flat, CRP 32, VBG normal, procal 0.13    Sweetie M. HANNY Cee  [facility] RRT AMCOM Job Code Contact #[phone]    Hospital Course   Brief Summary of events leading to rapid response:   Hypoxia <85% noted per nursing and triggered RRT    Hypoxia with chest tightness and shortness of breath started suddenly at 18:11. Patient notes persistent L>R back pain that is not controlled with mPCA and has not had sufficient pain control, no recent changes were made to her narcotic pain medications. No sedation noted.     Denies N/V, palpitations, dizziness, lightheadedness, numbness/tingling, pins and needles, headache, rashes/skin changes, new lumps, urinary complaints including dysuria, changes to bowels or diarrhea    Admission Diagnosis:   Sickle cell crisis (H) [D57.00] *    Physical Exam   Temp: 100.4  F (38  C)(MD  notified.) Temp  Min: 96.7  F (35.9  C)  Max: 100.4  F (38  C)  Resp: 22 Resp  Min: 16  Max: 22  SpO2: 99 % SpO2  Min: 87 %  Max: 99 %  Pulse: 129 Pulse  Min: 94  Max: 129    No data recorded  BP: (!) 143/88 Systolic (24hrs), Av , Min:95 , Max:143   Diastolic (24hrs), Av, Min:50, Max:88     I/Os: I/O last 3 completed shifts:  In: 44 [I.V.:44]  Out: 1800 [Urine:1800]     Exam:   General: in no acute distress  Mental Status: baseline mental status.  CV: tachy, RRR, no m/r/g  PULM: diminished bases, otherwise CTAB  GI: soft, NT  Back: no lesions   Extremities: moves all extremities, + DP pulses, no edema    Significant Results and Procedures   Lactic Acid:   Recent Labs   Lab Test 21  1146 21  2144 21  1553 21  2129 21  21221  22021  2200 20  0038   LACT  --   --   --   --  0.7  --  1.1 0.7   LACTS 1.1 1.0 0.9   < >  --    < >  --   --     < > = values in this interval not displayed.     CBC:   Recent Labs   Lab Test 21  0848 21  0746 21  1410   WBC 12.9* Canceled, Test credited 12.4*   HGB 7.4* Canceled, Test credited 7.6*   HCT 21.0* Canceled, Test credited 20.7*    Canceled, Test credited 335        Sepsis Evaluation   The patient is not known to have an infection.  Jennifer Cervantes meets SIRS criteria but does NOT have a lactate >2 or other evidence of acute organ damage.  These vital sign, lab and physical exam findings are consistent with SEPSIS.    Sepsis Time-Zero (time Sepsis diagnosis confirmed): 18:11  21    Anti-infectives (From now, onward)    Start     Dose/Rate Route Frequency Ordered Stop    21 1900  cefTRIAXone (ROCEPHIN) 1 g vial to attach to  mL bag for ADULTS or NS 50 mL bag for PEDS      1 g  over 15-30 Minutes Intravenous EVERY 24 HOURS 21 1830      21 1900  azithromycin (ZITHROMAX) 500 mg in sodium chloride 0.9 % 250 mL intermittent infusion      500 mg  over 1 Hours Intravenous EVERY  24 HOURS 04/28/21 1830          Current antibiotic coverage is appropriate for source of infection.

## 2021-04-28 NOTE — PROGRESS NOTES
"Pt is AxOx4. Pt on 2L o2 via NC to maintain sats above 92%. Running tachy in the 90s. Paged out at beginning of shift to confirm if there was any new orders related to PE diagnosed in IR. Per note in chart from Juliette Miles MD they are aware of IR results and Pt will continue on her  Eliquis Q12. Pt has a flat effect and doesn't give much for communication except \"yes\" and \"no\". Pt tolerating diet. Up with SBA. PCA in use and pt reports pain controled. Pt was able to rest most of the night. Continue to monitor and POC.   "

## 2021-04-28 NOTE — PROGRESS NOTES
[facility]    Medicine Progress Note - Hospitalist Service, Gold 4       Date of Admission:  4/26/2021  Assessment & Plan         Jennifer Cervantes is a 21 year old female with a past medical history of sickle cell anemia, CVA with residual RUE hemiparesis, iron overload 2/2 frequent transfusions, asthma, depression and anxiety who presents with worsening pain concerning for an acute sickle cell pain crisis.       1. Acute Sickle Cell Crisis  2. Acute Pain secondary to #1  3. Tachycardia secondary to #1  -Hematology consulted and recommends continuing with current pain plan with morphine pca. No transfusions needed at this time. Continue to monitor labs and symptoms closely.  -Start morphine PCA per pain plan              Continuous rate: 1mg/hr              PCA dose: 1mg               PCA lockout: 20 minutes              1 hour limit: 4mg   -Continue hydroxyurea 2000mg daily     4. Acute hypoxic respiratory failure secondary to #1 and # 5  5. Worsening of bilateral PE with history of RUE DVT, on chronic anticoagulation  -VQ scan checked on admission due to hypoxia and was found to have mild progression of prior bilateral Pes. Hematology is following and recommended continuing current Eliquis with concerns for missed doses at home and current lung inflammation from covid that may both be contributing to findings.  -She is currently 86% on room air / 91% on 1L / 94% on 2L - continue to adjust as needed to maintain O2 92% or greater     6. History of iron overload  -Continue home regimen of deferasirox 1440mg daily     7. Hx of CVA (2015) with residual RUE deficits   -Able to independently care for herself. No acute issues.      8. Depression/Anxiety  -No acute issues since admission. Continue home Zoloft 200mg daily and Abilify 2mg daily.     9. Asthma  -No acute issues since admission. Continue home Breo Ellipta and albuterol      Diet: Combination Diet Regular Diet Adult     DVT Prophylaxis: Eliquis  Lopez Catheter: not present  Code Status: Full Code         Disposition Plan   Expected discharge:   Entered: RONALDO Kaur 04/28/2021, 4:23 PM     RONALDO Kaur  Hospitalist Service, [address]  Contact information available via [facility] Paging/Directory  Please see sign in/sign out for up to date coverage information  ______________________________________________________________________    Interval History   Ms. Cervantes was resting in bed this morning reporting that she continues to have whole body pain but her pca is helping a lot to control this. She struggles to have an appetite and is hoping her energy improves. She denies any shortness of breath, coughing, fevers, chills or additional new concerns.     Data reviewed today: I reviewed all medications, new labs and imaging results over the last 24 hours.     Physical Exam   Vital Signs: Temp: (P) 100.4  F (38  C) Temp src: (P) Oral BP: (P) 114/50 Pulse: (P) 113   Resp: (P) 22 SpO2: (P) 99 % O2 Device: (P) Oxi Plus Oxygen Delivery: 2 LPM  Weight: 0 lbs 0 oz  General Appearance: 21 year old female resting in bed, reports pain 7/10 over whole body, pleasant in conversation  Respiratory: breathing comfortably on 2.5L of oxygen, no adventitious sounds to bilateral auscultation  Cardiovascular: regular rate and rhythm, no appreciable murmurs, rubs or gallops  GI: faint bowel sounds present, soft, minimally tender throughout, no rebound or guarding  Skin: warm, dry, no open sores, lesions or ulcerations       Data   Recent Labs   Lab 04/28/21  0848 04/28/21  0746 04/26/21  1410 04/22/21  0019 04/22/21  0019   WBC 12.9* Canceled, Test credited 12.4*   < > 13.7*   HGB 7.4* Canceled, Test credited 7.6*   < > 7.9*   MCV 94 Canceled, Test credited 89   < > 92    Canceled, Test credited 335   < > 337   NA  --  140 139  --  138   POTASSIUM  --  4.2 3.7  --  3.5   CHLORIDE  --   110* 108  --  110*   CO2  --  25 22  --  22   BUN  --  5* 7  --  9   CR  --  0.54 0.58  --  0.59   ANIONGAP  --  6 9  --  6   MICAH  --  8.6 9.2  --  8.4*   GLC  --  91 96  --  105*   ALBUMIN  --   --  3.9  --  3.7   PROTTOTAL  --   --  8.5  --  7.8   BILITOTAL  --   --  4.6*  --  3.3*   ALKPHOS  --   --  89  --  84   ALT  --   --  117*  --  144*   AST  --   --  131*  --  145*   TROPI  --   --   --   --  <0.015    < > = values in this interval not displayed.

## 2021-04-28 NOTE — PLAN OF CARE
Patient triggered the sepsis protocol today, lactic was 1.1. HR slightly tachy. Other vitals stable. Oxygen is at 2.5 L, sats mid-nineties. Voiding good amounts of tea colored urine, up to bedside commode. PCA managing pain. Took scheduled oral meds this morning, but declined Abilify, Zoloft and laxatives.

## 2021-04-29 ENCOUNTER — ANCILLARY PROCEDURE (OUTPATIENT)
Dept: ULTRASOUND IMAGING | Facility: CLINIC | Age: 21
End: 2021-04-29
Attending: PEDIATRICS
Payer: COMMERCIAL

## 2021-04-29 ENCOUNTER — APPOINTMENT (OUTPATIENT)
Dept: GENERAL RADIOLOGY | Facility: CLINIC | Age: 21
DRG: 811 | End: 2021-04-29
Attending: PEDIATRICS
Payer: COMMERCIAL

## 2021-04-29 LAB
ALBUMIN UR-MCNC: NEGATIVE MG/DL
ANION GAP SERPL CALCULATED.3IONS-SCNC: 6 MMOL/L (ref 3–14)
APIXABAN PPP CHRO-MCNC: 24 NG/ML
APPEARANCE UR: CLEAR
BACTERIA #/AREA URNS HPF: ABNORMAL /HPF
BILIRUB UR QL STRIP: NEGATIVE
BUN SERPL-MCNC: 7 MG/DL (ref 7–30)
CALCIUM SERPL-MCNC: 8.7 MG/DL (ref 8.5–10.1)
CHLORIDE SERPL-SCNC: 101 MMOL/L (ref 94–109)
CO2 SERPL-SCNC: 26 MMOL/L (ref 20–32)
COLOR UR AUTO: YELLOW
CREAT SERPL-MCNC: 0.55 MG/DL (ref 0.52–1.04)
ERYTHROCYTE [DISTWIDTH] IN BLOOD BY AUTOMATED COUNT: 23.7 % (ref 10–15)
GFR SERPL CREATININE-BSD FRML MDRD: >90 ML/MIN/{1.73_M2}
GLUCOSE SERPL-MCNC: 102 MG/DL (ref 70–99)
GLUCOSE UR STRIP-MCNC: NEGATIVE MG/DL
HCT VFR BLD AUTO: 20.3 % (ref 35–47)
HGB BLD-MCNC: 7.2 G/DL (ref 11.7–15.7)
HGB UR QL STRIP: NEGATIVE
INTERPRETATION ECG - MUSE: NORMAL
INTERPRETATION ECG - MUSE: NORMAL
KETONES UR STRIP-MCNC: NEGATIVE MG/DL
LEUKOCYTE ESTERASE UR QL STRIP: ABNORMAL
MCH RBC QN AUTO: 31.6 PG (ref 26.5–33)
MCHC RBC AUTO-ENTMCNC: 35.5 G/DL (ref 31.5–36.5)
MCV RBC AUTO: 89 FL (ref 78–100)
MUCOUS THREADS #/AREA URNS LPF: PRESENT /LPF
NITRATE UR QL: NEGATIVE
NT-PROBNP SERPL-MCNC: 34 PG/ML (ref 0–450)
PH UR STRIP: 6 PH (ref 5–7)
PLATELET # BLD AUTO: 298 10E9/L (ref 150–450)
POTASSIUM SERPL-SCNC: 3.8 MMOL/L (ref 3.4–5.3)
RBC # BLD AUTO: 2.28 10E12/L (ref 3.8–5.2)
RBC #/AREA URNS AUTO: 2 /HPF (ref 0–2)
RETICS # AUTO: 469.7 10E9/L (ref 25–95)
RETICS/RBC NFR AUTO: 20.6 % (ref 0.5–2)
SODIUM SERPL-SCNC: 134 MMOL/L (ref 133–144)
SOURCE: ABNORMAL
SP GR UR STRIP: 1.01 (ref 1–1.03)
SQUAMOUS #/AREA URNS AUTO: 5 /HPF (ref 0–1)
TROPONIN I SERPL-MCNC: <0.015 UG/L (ref 0–0.04)
UFH PPP CHRO-ACNC: 0.37 IU/ML
UROBILINOGEN UR STRIP-MCNC: 12 MG/DL (ref 0–2)
WBC # BLD AUTO: 19.1 10E9/L (ref 4–11)
WBC #/AREA URNS AUTO: 6 /HPF (ref 0–5)

## 2021-04-29 PROCEDURE — 258N000003 HC RX IP 258 OP 636: Performed by: NURSE PRACTITIONER

## 2021-04-29 PROCEDURE — 80299 QUANTITATIVE ASSAY DRUG: CPT

## 2021-04-29 PROCEDURE — 99233 SBSQ HOSP IP/OBS HIGH 50: CPT | Performed by: STUDENT IN AN ORGANIZED HEALTH CARE EDUCATION/TRAINING PROGRAM

## 2021-04-29 PROCEDURE — 71045 X-RAY EXAM CHEST 1 VIEW: CPT | Mod: 26 | Performed by: RADIOLOGY

## 2021-04-29 PROCEDURE — 250N000011 HC RX IP 250 OP 636: Performed by: PHYSICIAN ASSISTANT

## 2021-04-29 PROCEDURE — 250N000011 HC RX IP 250 OP 636: Performed by: PEDIATRICS

## 2021-04-29 PROCEDURE — 258N000003 HC RX IP 258 OP 636: Performed by: PHYSICIAN ASSISTANT

## 2021-04-29 PROCEDURE — 36415 COLL VENOUS BLD VENIPUNCTURE: CPT | Performed by: PHYSICIAN ASSISTANT

## 2021-04-29 PROCEDURE — 80048 BASIC METABOLIC PNL TOTAL CA: CPT | Performed by: PEDIATRICS

## 2021-04-29 PROCEDURE — 258N000003 HC RX IP 258 OP 636: Performed by: PEDIATRICS

## 2021-04-29 PROCEDURE — 83880 ASSAY OF NATRIURETIC PEPTIDE: CPT | Performed by: PEDIATRICS

## 2021-04-29 PROCEDURE — 87040 BLOOD CULTURE FOR BACTERIA: CPT | Performed by: PHYSICIAN ASSISTANT

## 2021-04-29 PROCEDURE — 250N000011 HC RX IP 250 OP 636: Performed by: NURSE PRACTITIONER

## 2021-04-29 PROCEDURE — 84484 ASSAY OF TROPONIN QUANT: CPT | Performed by: PEDIATRICS

## 2021-04-29 PROCEDURE — 99233 SBSQ HOSP IP/OBS HIGH 50: CPT | Mod: GC | Performed by: INTERNAL MEDICINE

## 2021-04-29 PROCEDURE — 85027 COMPLETE CBC AUTOMATED: CPT | Performed by: PEDIATRICS

## 2021-04-29 PROCEDURE — 36415 COLL VENOUS BLD VENIPUNCTURE: CPT | Performed by: PEDIATRICS

## 2021-04-29 PROCEDURE — 85045 AUTOMATED RETICULOCYTE COUNT: CPT | Performed by: PEDIATRICS

## 2021-04-29 PROCEDURE — 81001 URINALYSIS AUTO W/SCOPE: CPT | Performed by: PHYSICIAN ASSISTANT

## 2021-04-29 PROCEDURE — 71045 X-RAY EXAM CHEST 1 VIEW: CPT

## 2021-04-29 PROCEDURE — 120N000003 HC R&B IMCU UMMC

## 2021-04-29 PROCEDURE — 85520 HEPARIN ASSAY: CPT | Performed by: PHYSICIAN ASSISTANT

## 2021-04-29 PROCEDURE — 250N000013 HC RX MED GY IP 250 OP 250 PS 637: Performed by: PHYSICIAN ASSISTANT

## 2021-04-29 PROCEDURE — 36415 COLL VENOUS BLD VENIPUNCTURE: CPT

## 2021-04-29 PROCEDURE — 99207 PR APP CREDIT; MD BILLING SHARED VISIT: CPT | Performed by: PEDIATRICS

## 2021-04-29 RX ORDER — SODIUM CHLORIDE, SODIUM LACTATE, POTASSIUM CHLORIDE, CALCIUM CHLORIDE 600; 310; 30; 20 MG/100ML; MG/100ML; MG/100ML; MG/100ML
INJECTION, SOLUTION INTRAVENOUS CONTINUOUS
Status: ACTIVE | OUTPATIENT
Start: 2021-04-29 | End: 2021-04-29

## 2021-04-29 RX ADMIN — ARIPIPRAZOLE 2 MG: 2 TABLET ORAL at 09:31

## 2021-04-29 RX ADMIN — AZITHROMYCIN MONOHYDRATE 500 MG: 500 INJECTION, POWDER, LYOPHILIZED, FOR SOLUTION INTRAVENOUS at 18:57

## 2021-04-29 RX ADMIN — ENOXAPARIN SODIUM 80 MG: 80 INJECTION SUBCUTANEOUS at 16:17

## 2021-04-29 RX ADMIN — SODIUM CHLORIDE 500 ML: 9 INJECTION, SOLUTION INTRAVENOUS at 04:23

## 2021-04-29 RX ADMIN — HYDROXYUREA 2000 MG: 500 CAPSULE ORAL at 09:30

## 2021-04-29 RX ADMIN — SODIUM CHLORIDE, POTASSIUM CHLORIDE, SODIUM LACTATE AND CALCIUM CHLORIDE: 600; 310; 30; 20 INJECTION, SOLUTION INTRAVENOUS at 10:19

## 2021-04-29 RX ADMIN — Medication: at 10:23

## 2021-04-29 RX ADMIN — CELECOXIB 100 MG: 100 CAPSULE ORAL at 09:31

## 2021-04-29 RX ADMIN — ACETAMINOPHEN 650 MG: 325 TABLET, FILM COATED ORAL at 20:11

## 2021-04-29 RX ADMIN — SODIUM CHLORIDE, POTASSIUM CHLORIDE, SODIUM LACTATE AND CALCIUM CHLORIDE 500 ML: 600; 310; 30; 20 INJECTION, SOLUTION INTRAVENOUS at 10:19

## 2021-04-29 RX ADMIN — APIXABAN 5 MG: 5 TABLET, FILM COATED ORAL at 09:31

## 2021-04-29 RX ADMIN — SERTRALINE HYDROCHLORIDE 200 MG: 100 TABLET ORAL at 09:29

## 2021-04-29 RX ADMIN — CEFTRIAXONE 1 G: 1 INJECTION, POWDER, FOR SOLUTION INTRAMUSCULAR; INTRAVENOUS at 18:24

## 2021-04-29 ASSESSMENT — ACTIVITIES OF DAILY LIVING (ADL)
ADLS_ACUITY_SCORE: 14

## 2021-04-29 NOTE — PROGRESS NOTES
Medicine XC Note:    Notified by nursing around 245 am of pt's worsening tachypnea, tachycardia.    Chart notes reviewed, notable for acute sickle cell crisis, acute pain, acute hypoxic respiratory failure, worsening of bilateral PE (imaging reports reviewed).   Pt notes that pain in her chest is keeping her from taking a deeper breaths.   Rapid response earlier.   Started on abx.   Labs reviewed    Pt on 3-5L throughout day, now 92% on 5L (slightly worse)  RR has increased from 18-20, now up to 28.   Pt noted to be taking veyr shallow breaths.  Tells me pain is limiting  Tachycardic but regular.   No murmurs appreciated  Very poor air movement in both bases.   No wheezes appreciated.  Slight use of accessory muscles  Anxious appearing    POCUS of heart done (doesn't seem to have been transmitted to EMR).   PLAX, PSAX, A4C show tachycardia, appx nl LV function, no clear evidence of RV dilation or septal changes, no pericardial effusion.   IVC < 5 mm  XR ordered-- my read shows persistent hazziness in right lower lobe    Labs reviewed from earlier    21 year old with sickle cell, currently with acute crisis, pulmonary infarction, with worsening findings on V/Q 1-2 days ago, worsening pain, chest infiltrate.   Ddx includes PE, acute chest, pneumonia, other  Plan:  -stat trop, lactate, CBC, retic  -500 ml fluid bolus, increase maintenance rate  -probable blood transfusion (presentation does seem consistent with acute chest), will plan to discuss with latrell fellow when labs return  -continue abx  -continue anti-coagluation  -increase morphine PCA again to allow better, deeper breathing, capno ordered.    Jered Carmen MD  Med-Peds Hospitalist\    -------------    Addendum:  Discussed with Dr Arroyo, latrell fellow, who carefully reviewed case.  Agrees with current mgmt.  Defer blood tranfusion for now; pay close attention to response to pain med increase

## 2021-04-29 NOTE — PLAN OF CARE
[id number] Alert and oriented x4. Back and chest pain not adequately managed with Morphine PCA. Episode of increased shortness of breath overnight; increased O2 requirement during this time. Shallow, labored breathing with RR 20s-30s. Breath sounds coarse throughout lungs bilaterally. Tachycardic 100s to one-teens. MD notified, see provider notification notes. Capno now in place following PCA increase. Pt appearing to rest more comfortably following PCA increase. Pt received 500 mL bolus. Port not accessed; swelling noted at site, cold pack applied. Voiding spontaneously adequate amounts. Passing flatus, last BM yesterday per pt report (however, no BM charted). Up with SBA to bedside commode. On regular diet. Pt remains tachycardic and tachypneic. OVSS, on 6 L O2 oxymask.

## 2021-04-29 NOTE — PROVIDER NOTIFICATION
RRT responder note:       04/28/21 1800   Call Information   Date of Call 04/28/21   Time of Call 1810   Name of person requesting the team Kellee   Title of person requesting team RN   RRT Arrival time 1812   Time RRT ended 1850   Reason for call   Type of RRT Adult   Primary reason for call Respiratory   Respiratory O2sat less than 88% for greater than 5 minutes despite O2   Was patient transferred from the ED, ICU, or PACU within last 24 hours prior to RRT call? No   SBAR   Situation o2 sats 88% for greater than 5 minutes.    Background Jennifer Cervantes is a 21 year old female with a past medical history of sickle cell anemia, CVA with residual RUE hemiparesis, iron overload 2/2 frequent transfusions, asthma, depression and anxiety who presents with worsening pain concerning for an acute sickle cell pain crisis.     Notable History/Conditions Neurological  (CVA. )   Assessment Anxious/crying. c/o left flank pain. tachycardic, on 4 liters sats mid 90s.    Interventions CXR;ECG;Labs;Meds   Patient Outcome   Patient Outcome Stabilized on unit   RRT Team   Attending/Primary/Covering Physician Gold medicine   Date Attending Physician notified 04/28/21   Time Attending Physician notified 1815   Physician(s) Sweetie HIGGINS   Lead RN jordyn Delcid   Post RRT Intervention Assessment   Post RRT Assessment Stable/Improved   Date Follow Up Done 04/28/21   Time Follow Up Done 2048   Comments still febrile. tachycardic, pain is better.

## 2021-04-29 NOTE — PROGRESS NOTES
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     She developed fever with T 100.8 with need for 6L oxygen on 4/28/21 night. She has left side pain worsening with deep breath, different than her sickle pain.    # Worsening PE  - 4/27 V/Q scan shows bilateral subsegmental perfusion defects, left greater  than right, consistent with pulmonary emboli. Overall findings have  worsened since the comparison perfusion scan on 2/1/2021, particularly  within the left lung.   - She was admitted 2/1/21-2/3/21 with a new PE, started on Rivaroxaban, switched to Eliquis 3/25/21 with finding of RUE DVT (? rivaroxaban failure). She only held Eliquis for 1 day for Left Port Removal and Left Port Placement on 4/21.   -She states that she was compliant with all Eliquis so it was continued since admission. Her increased left side chest pain feels different than her usual sickle pain, and is likely due to worsening PE. If her pain is from PNA then it should be on the right side.  We checked her Apixaban level this morning which is 29, much lower than the level for patient who is on 5mg bid dose for PE treatment. So she is likely to have apixaban failure. Lovenox should be started.   - all these recommendations were dicussed with the primary team multiple  times during the day    # Community acquired PNA  # Sickle cell pain crisis  # Iron overload  - continue Abx with Ceftriaxone and Azithromycin  - IVF 100ml/h  - continue home medication Deferasirox and hydroxyurea  - continue pain regimen per established treatment plan     Recommendations:   - continue pain regimen per primary team  - change Apixaban to Lovenox 1mg/kg  - will continue to follow    Patient was seen and plan of care was discussed with attending physician  .    We will continue to follow this patient. Please don't hesitate to contact the Fellow On-Call with questions.    Juliette Miles MD, PhD  Hematology/Oncology fellow  Pager: [phone]        HEMATOLOGY STAFF:  Seen with fellow, whose note reflects our joint evaluation, assessment, and plan.    Continues to have low-grade fevers and increased oxygen requirements.  Complains of pleuritic left-sided chest and back pain which feels different than her usual sickle pain.  Although we were initially skeptical that the 4/27 VQ scan represented new pulmonary emboli, after checking her apixaban level today and finding that it is much lower than expected, we now question whether she is adequately absorbing the medication.  Thus, her imaging findings and clinical picture are likely due to worsened pulmonary emboli.  Unclear why she is not absorbing apixaban adequately.  Plan switch to enoxaparin for now.  Dr. Duncan updated.      Jose Manuel Hernandez MD  Associate Professor of Medicine  Division of Hematology, Oncology, and Transplantation  Director, [facility]        ___________________________________________________________________    Subjective & Interval History:    She developed fever with T 100.8 with need for 6L oxygen on 4/28/21 night. She has left side pain worsening with deep breath.  Different than her sickle pain.       Physical Exam:    /45 (BP Location: Left arm)   Pulse 120   Temp 98.8  F (37.1  C)  (Axillary)   Resp 26   SpO2 93%   Gen: in pain, on face mask 6L oxygen  CV: tachycardia  Pulm: CTAB,minimal crackles  Abd: Soft  Ext: No lower extremity edema  Skin: No rash  Neuro: Alert and answering questions appropriately. Irritated easily    Labs & Studies: I personally reviewed the following studies:  ROUTINE LABS (Last four results):  CMP  Recent Labs   Lab 04/29/21  0344 04/28/21  0746 04/26/21  1410    140 139   POTASSIUM 3.8 4.2 3.7   CHLORIDE 101 110* 108   CO2 26 25 22   ANIONGAP 6 6 9   * 91 96   BUN 7 5* 7   CR 0.55 0.54 0.58   GFRESTIMATED >90 >90 >90   GFRESTBLACK >90 >90 >90   MICAH 8.7 8.6 9.2   PROTTOTAL  --   --  8.5   ALBUMIN  --   --  3.9   BILITOTAL  --   --  4.6*   ALKPHOS  --   --  89   AST  --   --  131*   ALT  --   --  117*     CBC  Recent Labs   Lab 04/29/21  0344 04/28/21  0848 04/28/21  0746 04/26/21  1410   WBC 19.1* 12.9* Canceled, Test credited 12.4*   RBC 2.28* 2.24* Canceled, Test credited 2.33*   HGB 7.2* 7.4* Canceled, Test credited 7.6*   HCT 20.3* 21.0* Canceled, Test credited 20.7*   MCV 89 94 Canceled, Test credited 89   MCH 31.6 33.0 Canceled, Test credited 32.6   MCHC 35.5 35.2 Canceled, Test credited 36.7*   RDW 23.7* 24.6* Canceled, Test credited 25.6*    280 Canceled, Test credited 335     INRNo lab results found in last 7 days.    Medications list for reference:  Current Facility-Administered Medications   Medication     acetaminophen (TYLENOL) tablet 650 mg     albuterol (PROAIR HFA/PROVENTIL HFA/VENTOLIN HFA) 108 (90 Base) MCG/ACT inhaler 2 puff     albuterol (PROVENTIL) neb solution 2.5 mg     ARIPiprazole (ABILIFY) tablet 2 mg     azithromycin (ZITHROMAX) 500 mg in sodium chloride 0.9 % 250 mL intermittent infusion     cefTRIAXone (ROCEPHIN) 1 g vial to attach to  mL bag for ADULTS or NS 50 mL bag for PEDS     celecoxib (celeBREX) capsule 100 mg     deferasirox (JADENU) tablet 1,440 mg     diclofenac (VOLTAREN) 1 % topical gel 4 g      diphenhydrAMINE (BENADRYL) capsule 25 mg     enoxaparin ANTICOAGULANT (LOVENOX) injection 80 mg     fluticasone-vilanterol (BREO ELLIPTA) 200-25 MCG/INH inhaler 1 puff     hydroxyurea (HYDREA) capsule 2,000 mg     lactated ringers infusion     Lidocaine (LIDOCARE) 4 % Patch 1 patch    And     lidocaine patch in PLACE     lidocaine (LMX4) cream     lidocaine 1 % 0.1-1 mL     melatonin tablet 1 mg     morphine  PCA 1 mg/mL OPIOID TOLERANT     ondansetron (ZOFRAN) injection 4-8 mg     Patient is already receiving anticoagulation with heparin, enoxaparin (LOVENOX), warfarin (COUMADIN)  or other anticoagulant medication     polyethylene glycol (MIRALAX) Packet 17 g     senna-docusate (SENOKOT-S/PERICOLACE) 8.6-50 MG per tablet 1 tablet    Or     senna-docusate (SENOKOT-S/PERICOLACE) 8.6-50 MG per tablet 2 tablet     sertraline (ZOLOFT) tablet 200 mg     sodium chloride (PF) 0.9% PF flush 3 mL     sodium chloride (PF) 0.9% PF flush 3 mL     sodium chloride 0.9% infusion

## 2021-04-29 NOTE — PROGRESS NOTES
[facility]    Medicine Progress Note - Hospitalist Service, Gold 4       Date of Admission:  4/26/2021  Assessment & Plan           Jennifer Cervantes is a 21 year old female with a past medical history of sickle cell anemia, CVA with residual RUE hemiparesis, iron overload 2/2 frequent transfusions, asthma, depression and anxiety who presents with worsening pain concerning for an acute sickle cell pain crisis.    1. Acute chest syndrome  2.  Acute Sickle Cell Crisis  3. Acute Pain secondary to #1  4. Sinus tachycardia secondary to #1  5. Acute hypoxic respiratory failure secondary to #1 - # 6  6. Worsening of bilateral PE with history of RUE DVT, on chronic anticoagulation  Overnight, she developed worsening tachypnea requiring 6L closed facemask, woresening tachycardia. Chest x-ray display infiltrate in right lower lobe of the lung.   -Continue Ceftriaxone / Azithromcyin  -Hematology consulted and recommends no transfusion at this time but switching to Lovenox 1mg/kg q12h with checking a heparin xa level 3hours after her apixaban dose this morning. VQ scan yesterday had displayed worsening bilateral PE while she was previously on Eliquis.  -Continue hydroxyurea 2000mg daily  -Continue morphine PCA per pain plan              Continuous rate: 1mg/hr              PCA dose: 1mg               PCA lockout: 20 minutes              1 hour limit: 4mg     -Given her increased oxygen needs, she will transfer to  once a bed is available     7. History of iron overload  -Continue home regimen of deferasirox 1440mg daily     8. Hx of CVA (2015) with residual RUE deficits   -Able to independently care for herself. No acute issues.      9. Depression/Anxiety  -No acute issues since admission. Continue home Zoloft 200mg daily and Abilify 2mg daily.     10. Asthma  -No acute issues since admission. Continue home Breo Ellipta and albuterol      Diet: Combination Diet Regular Diet Adult     DVT Prophylaxis: Eliquis  Lopez Catheter: not present  Code Status: Full Code         Disposition Plan   Expected discharge:   Entered: RONALDO Kaur 04/29/2021, 2:27 PM     RONALDO Kaur  Hospitalist Service, [address]  Contact information available via [facility] Paging/Directory  Please see sign in/sign out for up to date coverage information  ______________________________________________________________________    Interval History   Ms. Cervantes was resting in bed this morning with 7L closed facemask telling me that she continues to have pain all over her body. She denies feeling short of breath currently, denies any fevers, chills or additional concerns. We discussed the events from overnight, plan of care and she had no further questions or concerns.    Data reviewed today: I reviewed all medications, new labs and imaging results over the last 24 hours.     Physical Exam   Vital Signs: Temp: 98.8  F (37.1  C) Temp src: Axillary BP: 113/45 Pulse: 120   Resp: 26 SpO2: 93 % O2 Device: Oxymask Oxygen Delivery: 6 LPM  Weight: 0 lbs 0 oz  General Appearance: 21 year old female resting in bed, reports pain 8/10 over whole body, pleasant in conversation  Respiratory: breathing comfortably on 7L of oxygen, no adventitious sounds to bilateral auscultation  Cardiovascular: regular rate and rhythm, no appreciable murmurs, rubs or gallops  GI: faint bowel sounds present, soft, minimally tender throughout, no rebound or guarding  Skin: warm, dry, no open sores, lesions or ulcerations       Data   Recent Labs   Lab 04/29/21  0344 04/28/21  1850 04/28/21  0848 04/28/21  0746 04/26/21  1410   WBC 19.1*  --  12.9* Canceled, Test credited 12.4*   HGB 7.2*  --  7.4* Canceled, Test credited 7.6*   MCV 89  --  94 Canceled, Test credited 89     --  280 Canceled, Test credited 335     --   --  140 139   POTASSIUM 3.8  --   --  4.2 3.7   CHLORIDE 101  --    --  110* 108   CO2 26  --   --  25 22   BUN 7  --   --  5* 7   CR 0.55  --   --  0.54 0.58   ANIONGAP 6  --   --  6 9   MICAH 8.7  --   --  8.6 9.2   *  --   --  91 96   ALBUMIN  --   --   --   --  3.9   PROTTOTAL  --   --   --   --  8.5   BILITOTAL  --   --   --   --  4.6*   ALKPHOS  --   --   --   --  89   ALT  --   --   --   --  117*   AST  --   --   --   --  131*   TROPI <0.015 <0.015  --   --   --

## 2021-04-29 NOTE — PLAN OF CARE
Patient reports good pain control with Morphine PCA, denies nausea. Around 18:00 pt was complaining of shortness of breath, O2 sats were 87%-98%, febrile. MD, and Rapid Response team notified and pt assessed. See Epic for details. Pt reports feeling better, denies shortness of breath  or any discomfort. Tolerating regular diet, had chicken noodle soup and Vanilla pudding for dinner. New PIV infusing. Voiding spontaneously in commode. Continuous pulse oxymetry.

## 2021-04-29 NOTE — PROVIDER NOTIFICATION
Paged Jered Carmen at [phone]. Pt's RR mid-upper 30s on capno. Now requiring 6 L O2 oxymask.     MD aware. No new orders. Will continue to closely monitor.

## 2021-04-29 NOTE — PROVIDER NOTIFICATION
Notified PA at 0945 AM regarding changes in vital signs.  While taking medications, oxygen dropped to 75% on RA, back up to 90-92% on 6L oxymask. Heart rate up to 120-125. Respiratory rate up to 45 bpm.     Spoke with: RONALDO Baca    Orders were obtained. Started on IVMF and gave 500cc bolus of LR. Possible 6B transfer if oxygen requirements increase above 6L.    Comments: Will continue to monitor.

## 2021-04-29 NOTE — PLAN OF CARE
Tachycardic in 100-120s, respiratory rate in 30s, OVSS on 6L oxymask, capno in place - MD aware of VS. Pain somewhat managed with PCA morphine. Aox4, sleeping between cares. Shallow, labored breathing, increased SOB with activity.Up with SBA. Voids spontaneously into bedside commode with adequate UOP. No BM this shift but pt reports passing flatus. Port unaccessed, swelling noted around site, pt refused any cold packs. Right arm PIV infusing MIVF and PCA. Tolerating regular diet, denies nausea and refused any food, reported no appetite. Continue with POC.

## 2021-04-29 NOTE — PROVIDER NOTIFICATION
Paged Jered Carmen at [phone]. Pt is reporting increased shortness of breath. Breathing labored but regular. Increased O2 requirements from 3 to 5 L O2 oxymask to maintain O2 sats 90%>. HR one-teens, RR 28. Afebrile.     MD ordered Chest US B-Scan, CXR, and labs. MD also increased pt's PCA.

## 2021-04-30 ENCOUNTER — APPOINTMENT (OUTPATIENT)
Dept: GENERAL RADIOLOGY | Facility: CLINIC | Age: 21
DRG: 811 | End: 2021-04-30
Attending: PEDIATRICS
Payer: COMMERCIAL

## 2021-04-30 ENCOUNTER — APPOINTMENT (OUTPATIENT)
Dept: INTERVENTIONAL RADIOLOGY/VASCULAR | Facility: CLINIC | Age: 21
DRG: 811 | End: 2021-04-30
Attending: NURSE PRACTITIONER
Payer: COMMERCIAL

## 2021-04-30 LAB
ABO + RH BLD: NORMAL
ABO + RH BLD: NORMAL
B-HCG SERPL-ACNC: <1 IU/L (ref 0–5)
BASE EXCESS BLDV CALC-SCNC: 2.1 MMOL/L
BASOPHILS # BLD AUTO: 0.1 10E9/L (ref 0–0.2)
BASOPHILS NFR BLD AUTO: 0.5 %
BILIRUB SERPL-MCNC: 3.8 MG/DL (ref 0.2–1.3)
BLD GP AB SCN SERPL QL: NORMAL
BLD PROD TYP BPU: NORMAL
BLOOD BANK CMNT PATIENT-IMP: NORMAL
DIFFERENTIAL METHOD BLD: ABNORMAL
EOSINOPHIL # BLD AUTO: 0.5 10E9/L (ref 0–0.7)
EOSINOPHIL NFR BLD AUTO: 2.3 %
ERYTHROCYTE [DISTWIDTH] IN BLOOD BY AUTOMATED COUNT: 22.3 % (ref 10–15)
HCO3 BLDV-SCNC: 28 MMOL/L (ref 21–28)
HCT VFR BLD AUTO: 17.1 % (ref 35–47)
HCT VFR BLD AUTO: 18.6 % (ref 35–47)
HCT VFR BLD AUTO: 20.7 % (ref 35–47)
HGB BLD-MCNC: 6.1 G/DL (ref 11.7–15.7)
HGB BLD-MCNC: 6.7 G/DL (ref 11.7–15.7)
HGB BLD-MCNC: 6.8 G/DL (ref 11.7–15.7)
IMM GRANULOCYTES # BLD: 0.2 10E9/L (ref 0–0.4)
IMM GRANULOCYTES NFR BLD: 0.9 %
LACTATE BLD-SCNC: 0.9 MMOL/L (ref 0.7–2)
LYMPHOCYTES # BLD AUTO: 2.1 10E9/L (ref 0.8–5.3)
LYMPHOCYTES NFR BLD AUTO: 10.4 %
MCH RBC QN AUTO: 32.1 PG (ref 26.5–33)
MCHC RBC AUTO-ENTMCNC: 36 G/DL (ref 31.5–36.5)
MCV RBC AUTO: 89 FL (ref 78–100)
MONOCYTES # BLD AUTO: 1.9 10E9/L (ref 0–1.3)
MONOCYTES NFR BLD AUTO: 9.4 %
NEUTROPHILS # BLD AUTO: 15.6 10E9/L (ref 1.6–8.3)
NEUTROPHILS NFR BLD AUTO: 76.5 %
NRBC # BLD AUTO: 0.2 10*3/UL
NRBC BLD AUTO-RTO: 1 /100
NUM BPU REQUESTED: 8
O2/TOTAL GAS SETTING VFR VENT: NORMAL %
PCO2 BLDV: 48 MM HG (ref 40–50)
PH BLDV: 7.37 PH (ref 7.32–7.43)
PLATELET # BLD AUTO: 286 10E9/L (ref 150–450)
PO2 BLDV: 35 MM HG (ref 25–47)
RBC # BLD AUTO: 2.09 10E12/L (ref 3.8–5.2)
SPECIMEN EXP DATE BLD: NORMAL
WBC # BLD AUTO: 20.4 10E9/L (ref 4–11)

## 2021-04-30 PROCEDURE — 86900 BLOOD TYPING SEROLOGIC ABO: CPT | Performed by: STUDENT IN AN ORGANIZED HEALTH CARE EDUCATION/TRAINING PROGRAM

## 2021-04-30 PROCEDURE — 94640 AIRWAY INHALATION TREATMENT: CPT

## 2021-04-30 PROCEDURE — 85018 HEMOGLOBIN: CPT | Performed by: PATHOLOGY

## 2021-04-30 PROCEDURE — 250N000013 HC RX MED GY IP 250 OP 250 PS 637: Performed by: PEDIATRICS

## 2021-04-30 PROCEDURE — 82803 BLOOD GASES ANY COMBINATION: CPT | Performed by: PEDIATRICS

## 2021-04-30 PROCEDURE — C1752 CATH,HEMODIALYSIS,SHORT-TERM: HCPCS

## 2021-04-30 PROCEDURE — 999N000156 HC STATISTIC RCP CONSULT EA 30 MIN

## 2021-04-30 PROCEDURE — 76937 US GUIDE VASCULAR ACCESS: CPT

## 2021-04-30 PROCEDURE — 99233 SBSQ HOSP IP/OBS HIGH 50: CPT | Performed by: STUDENT IN AN ORGANIZED HEALTH CARE EDUCATION/TRAINING PROGRAM

## 2021-04-30 PROCEDURE — 86850 RBC ANTIBODY SCREEN: CPT | Performed by: STUDENT IN AN ORGANIZED HEALTH CARE EDUCATION/TRAINING PROGRAM

## 2021-04-30 PROCEDURE — 86923 COMPATIBILITY TEST ELECTRIC: CPT | Performed by: STUDENT IN AN ORGANIZED HEALTH CARE EDUCATION/TRAINING PROGRAM

## 2021-04-30 PROCEDURE — 250N000013 HC RX MED GY IP 250 OP 250 PS 637: Performed by: PHYSICIAN ASSISTANT

## 2021-04-30 PROCEDURE — 250N000009 HC RX 250: Performed by: PATHOLOGY

## 2021-04-30 PROCEDURE — 258N000003 HC RX IP 258 OP 636: Performed by: NURSE PRACTITIONER

## 2021-04-30 PROCEDURE — 999N000157 HC STATISTIC RCP TIME EA 10 MIN

## 2021-04-30 PROCEDURE — 250N000011 HC RX IP 250 OP 636: Performed by: PHYSICIAN ASSISTANT

## 2021-04-30 PROCEDURE — 272N000504 HC NEEDLE CR4

## 2021-04-30 PROCEDURE — 250N000009 HC RX 250: Performed by: PHYSICIAN ASSISTANT

## 2021-04-30 PROCEDURE — 120N000003 HC R&B IMCU UMMC

## 2021-04-30 PROCEDURE — 36512 APHERESIS RBC: CPT

## 2021-04-30 PROCEDURE — 82247 BILIRUBIN TOTAL: CPT | Performed by: PEDIATRICS

## 2021-04-30 PROCEDURE — 86901 BLOOD TYPING SEROLOGIC RH(D): CPT | Performed by: STUDENT IN AN ORGANIZED HEALTH CARE EDUCATION/TRAINING PROGRAM

## 2021-04-30 PROCEDURE — 76937 US GUIDE VASCULAR ACCESS: CPT | Mod: 26 | Performed by: RADIOLOGY

## 2021-04-30 PROCEDURE — 258N000003 HC RX IP 258 OP 636: Performed by: PHYSICIAN ASSISTANT

## 2021-04-30 PROCEDURE — 36415 COLL VENOUS BLD VENIPUNCTURE: CPT | Performed by: PEDIATRICS

## 2021-04-30 PROCEDURE — 77001 FLUOROGUIDE FOR VEIN DEVICE: CPT | Mod: 26 | Performed by: RADIOLOGY

## 2021-04-30 PROCEDURE — P9016 RBC LEUKOCYTES REDUCED: HCPCS | Performed by: STUDENT IN AN ORGANIZED HEALTH CARE EDUCATION/TRAINING PROGRAM

## 2021-04-30 PROCEDURE — 258N000003 HC RX IP 258 OP 636: Performed by: PEDIATRICS

## 2021-04-30 PROCEDURE — 71045 X-RAY EXAM CHEST 1 VIEW: CPT

## 2021-04-30 PROCEDURE — 71045 X-RAY EXAM CHEST 1 VIEW: CPT | Mod: 26 | Performed by: RADIOLOGY

## 2021-04-30 PROCEDURE — 99233 SBSQ HOSP IP/OBS HIGH 50: CPT | Performed by: INTERNAL MEDICINE

## 2021-04-30 PROCEDURE — 999N000127 HC STATISTIC PERIPHERAL IV START W US GUIDANCE

## 2021-04-30 PROCEDURE — 83021 HEMOGLOBIN CHROMOTOGRAPHY: CPT | Performed by: PATHOLOGY

## 2021-04-30 PROCEDURE — 86902 BLOOD TYPE ANTIGEN DONOR EA: CPT | Performed by: STUDENT IN AN ORGANIZED HEALTH CARE EDUCATION/TRAINING PROGRAM

## 2021-04-30 PROCEDURE — C1769 GUIDE WIRE: HCPCS

## 2021-04-30 PROCEDURE — 83605 ASSAY OF LACTIC ACID: CPT | Performed by: PEDIATRICS

## 2021-04-30 PROCEDURE — 250N000011 HC RX IP 250 OP 636: Performed by: NURSE PRACTITIONER

## 2021-04-30 PROCEDURE — 250N000011 HC RX IP 250 OP 636: Performed by: STUDENT IN AN ORGANIZED HEALTH CARE EDUCATION/TRAINING PROGRAM

## 2021-04-30 PROCEDURE — 99207 PR APP CREDIT; MD BILLING SHARED VISIT: CPT | Performed by: PHYSICIAN ASSISTANT

## 2021-04-30 PROCEDURE — 85025 COMPLETE CBC W/AUTO DIFF WBC: CPT | Performed by: PEDIATRICS

## 2021-04-30 PROCEDURE — 84702 CHORIONIC GONADOTROPIN TEST: CPT | Performed by: PEDIATRICS

## 2021-04-30 PROCEDURE — 250N000009 HC RX 250: Performed by: STUDENT IN AN ORGANIZED HEALTH CARE EDUCATION/TRAINING PROGRAM

## 2021-04-30 PROCEDURE — 02HV33Z INSERTION OF INFUSION DEVICE INTO SUPERIOR VENA CAVA, PERCUTANEOUS APPROACH: ICD-10-PCS | Performed by: RADIOLOGY

## 2021-04-30 PROCEDURE — 85014 HEMATOCRIT: CPT | Performed by: PATHOLOGY

## 2021-04-30 PROCEDURE — 272N000054 HC CANNULA HIGH FLOW, ADULT

## 2021-04-30 PROCEDURE — 250N000011 HC RX IP 250 OP 636: Performed by: PATHOLOGY

## 2021-04-30 PROCEDURE — 36556 INSERT NON-TUNNEL CV CATH: CPT

## 2021-04-30 PROCEDURE — 36556 INSERT NON-TUNNEL CV CATH: CPT | Mod: GC | Performed by: RADIOLOGY

## 2021-04-30 PROCEDURE — 999N000128 HC STATISTIC PERIPHERAL IV START W/O US GUIDANCE

## 2021-04-30 RX ORDER — DIPHENHYDRAMINE HYDROCHLORIDE 50 MG/ML
50 INJECTION INTRAMUSCULAR; INTRAVENOUS
Status: CANCELLED | OUTPATIENT
Start: 2021-04-30

## 2021-04-30 RX ORDER — LIDOCAINE HYDROCHLORIDE 10 MG/ML
1-30 INJECTION, SOLUTION EPIDURAL; INFILTRATION; INTRACAUDAL; PERINEURAL
Status: COMPLETED | OUTPATIENT
Start: 2021-04-30 | End: 2021-04-30

## 2021-04-30 RX ORDER — HEPARIN SODIUM (PORCINE) LOCK FLUSH IV SOLN 100 UNIT/ML 100 UNIT/ML
3 SOLUTION INTRAVENOUS ONCE
Status: COMPLETED | OUTPATIENT
Start: 2021-04-30 | End: 2021-04-30

## 2021-04-30 RX ORDER — SODIUM CHLORIDE, SODIUM LACTATE, POTASSIUM CHLORIDE, CALCIUM CHLORIDE 600; 310; 30; 20 MG/100ML; MG/100ML; MG/100ML; MG/100ML
INJECTION, SOLUTION INTRAVENOUS CONTINUOUS
Status: ACTIVE | OUTPATIENT
Start: 2021-04-30 | End: 2021-05-01

## 2021-04-30 RX ORDER — HEPARIN SODIUM (PORCINE) LOCK FLUSH IV SOLN 100 UNIT/ML 100 UNIT/ML
3 SOLUTION INTRAVENOUS EVERY 24 HOURS
Status: DISCONTINUED | OUTPATIENT
Start: 2021-04-30 | End: 2021-04-30 | Stop reason: HOSPADM

## 2021-04-30 RX ORDER — ACETAMINOPHEN 325 MG/1
650 TABLET ORAL EVERY 4 HOURS
Status: DISCONTINUED | OUTPATIENT
Start: 2021-04-30 | End: 2021-05-11 | Stop reason: HOSPADM

## 2021-04-30 RX ORDER — DIPHENHYDRAMINE HYDROCHLORIDE 50 MG/ML
25-50 INJECTION INTRAMUSCULAR; INTRAVENOUS
Status: COMPLETED | OUTPATIENT
Start: 2021-04-30 | End: 2021-04-30

## 2021-04-30 RX ORDER — HEPARIN SODIUM (PORCINE) LOCK FLUSH IV SOLN 100 UNIT/ML 100 UNIT/ML
3 SOLUTION INTRAVENOUS
Status: DISCONTINUED | OUTPATIENT
Start: 2021-04-30 | End: 2021-04-30 | Stop reason: HOSPADM

## 2021-04-30 RX ADMIN — ANTICOAGULANT CITRATE DEXTROSE SOLUTION FORMULA A: 12.25; 11; 3.65 SOLUTION INTRAVENOUS at 18:15

## 2021-04-30 RX ADMIN — ACETAMINOPHEN 650 MG: 325 TABLET, FILM COATED ORAL at 17:48

## 2021-04-30 RX ADMIN — ENOXAPARIN SODIUM 80 MG: 80 INJECTION SUBCUTANEOUS at 16:47

## 2021-04-30 RX ADMIN — FLUTICASONE FUROATE AND VILANTEROL TRIFENATATE 1 PUFF: 200; 25 POWDER RESPIRATORY (INHALATION) at 20:36

## 2021-04-30 RX ADMIN — CEFTRIAXONE 1 G: 1 INJECTION, POWDER, FOR SOLUTION INTRAMUSCULAR; INTRAVENOUS at 20:34

## 2021-04-30 RX ADMIN — Medication 3 ML: at 19:47

## 2021-04-30 RX ADMIN — HYDROXYUREA 2000 MG: 500 CAPSULE ORAL at 11:50

## 2021-04-30 RX ADMIN — ENOXAPARIN SODIUM 80 MG: 80 INJECTION SUBCUTANEOUS at 03:48

## 2021-04-30 RX ADMIN — AZITHROMYCIN MONOHYDRATE 500 MG: 500 INJECTION, POWDER, LYOPHILIZED, FOR SOLUTION INTRAVENOUS at 21:57

## 2021-04-30 RX ADMIN — SERTRALINE HYDROCHLORIDE 200 MG: 100 TABLET ORAL at 11:50

## 2021-04-30 RX ADMIN — DEFERASIROX 1440 MG: 360 TABLET ORAL at 20:42

## 2021-04-30 RX ADMIN — CELECOXIB 100 MG: 100 CAPSULE ORAL at 23:18

## 2021-04-30 RX ADMIN — Medication 3 ML: at 19:46

## 2021-04-30 RX ADMIN — ARIPIPRAZOLE 2 MG: 2 TABLET ORAL at 11:50

## 2021-04-30 RX ADMIN — Medication 2.5 ML: at 15:35

## 2021-04-30 RX ADMIN — LIDOCAINE HYDROCHLORIDE 10 ML: 10 INJECTION, SOLUTION EPIDURAL; INFILTRATION; INTRACAUDAL; PERINEURAL at 15:25

## 2021-04-30 RX ADMIN — SODIUM CHLORIDE 500 ML: 9 INJECTION, SOLUTION INTRAVENOUS at 05:48

## 2021-04-30 RX ADMIN — ALBUTEROL SULFATE 2.5 MG: 2.5 SOLUTION RESPIRATORY (INHALATION) at 05:42

## 2021-04-30 RX ADMIN — LIDOCAINE 1 PATCH: 560 PATCH PERCUTANEOUS; TOPICAL; TRANSDERMAL at 20:36

## 2021-04-30 RX ADMIN — ACETAMINOPHEN 650 MG: 325 TABLET, FILM COATED ORAL at 11:50

## 2021-04-30 RX ADMIN — CELECOXIB 100 MG: 100 CAPSULE ORAL at 11:50

## 2021-04-30 RX ADMIN — DIPHENHYDRAMINE HYDROCHLORIDE 50 MG: 50 INJECTION, SOLUTION INTRAMUSCULAR; INTRAVENOUS at 15:23

## 2021-04-30 RX ADMIN — ACETAMINOPHEN 650 MG: 325 TABLET, FILM COATED ORAL at 06:54

## 2021-04-30 RX ADMIN — SODIUM CHLORIDE, POTASSIUM CHLORIDE, SODIUM LACTATE AND CALCIUM CHLORIDE: 600; 310; 30; 20 INJECTION, SOLUTION INTRAVENOUS at 16:17

## 2021-04-30 ASSESSMENT — ACTIVITIES OF DAILY LIVING (ADL)
ADLS_ACUITY_SCORE: 15

## 2021-04-30 NOTE — PROGRESS NOTES
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     She developed fever with T 100.8 with need for 6L oxygen on 4/28/21 night. She has left side pain worsening with deep breath, different than her sickle pain.    # Worsening PE  - 4/27 V/Q scan shows bilateral subsegmental perfusion defects, left greater  than right, consistent with pulmonary emboli. Overall findings have  worsened since the comparison perfusion scan on 2/1/2021, particularly  within the left lung.   - She was admitted 2/1/21-2/3/21 with a new PE, started on Rivaroxaban, switched to Eliquis 3/25/21 with finding of RUE DVT (? rivaroxaban failure). She only held Eliquis for 1 day for Left Port Removal and Left Port Placement on 4/21.   -She states that she was compliant with all Eliquis so it was continued since admission. Her increased left side chest pain feels different than her usual sickle pain, and is likely due to worsening PE. If her pain is from PNA then it should be on the right side.  We checked her Apixaban level 4/29 and it was 29, much lower than expected, presumably due to poor absorption.  Switched to Lovenox 4/29 PM.    # Acute Chest Syndrome  # Community acquired PNA  # Sickle cell pain crisis  # Iron overload  - continue Abx with Ceftriaxone and  Azithromycin  - IVF 100ml/h  - continue home medication Deferasirox and hydroxyurea  - continue pain regimen per established treatment plan   - RBC exchange with transfusion medicine on 4/30 d/t persistent fevers and increasing O2 requirements     Recommendations:   - would recommend RBC exchange today with persistent fever and increasing O2 requirements.   - continue pain regimen per primary team  - continue Lovenox 1mg/kg; will need to determine outpatient anticoagulation closer to discharge   - continue antibiotics   - will continue to follow    Patient was seen and plan of care was discussed with attending physician  .    We will continue to follow this patient. Please don't hesitate to contact the Fellow On-Call with questions.    Emma Shaffer PA-C  Hematology/Oncology  Pager #[phone]        HEMATOLOGY STAFF:  Seen with consult team, whose note reflects our joint evaluation, assessment, and plan.      Jose Manuel Hernandez MD  Associate Professor of Medicine  Division of Hematology, Oncology, and Transplantation  Director, [facility]        ___________________________________________________________________    Subjective & Interval History:    Febrile to 101.5 increasing O2 requirments overnight. Patient states she is feeling ok and that her breathing is not getting any better. She continues to have persistent back pain. Discussed with the patient the need to do RBC exchange at this point. Patient was agreeable.       Physical Exam:    /62 (BP Location: Left arm)   Pulse 117   Temp 100.9  F (38.3  C) (Axillary)   Resp 18   Wt 76.8 kg (169 lb 5 oz)   SpO2 93%   BMI 29.06 kg/m    Gen: in pain, on face mask 8L oxygen  CV: tachycardia  Pulm: Tachypnic,minimal crackles not bilateral throughout. resting comfortably in bed   Abd: Soft  Ext: No lower extremity edema  Skin: No rash  Neuro: Alert and answering questions appropriately. Irritated easily    Labs & Studies: I  personally reviewed the following studies:  ROUTINE LABS (Last four results):  CMP  Recent Labs   Lab 04/30/21  0548 04/29/21  0344 04/28/21  0746 04/26/21  1410   NA  --  134 140 139   POTASSIUM  --  3.8 4.2 3.7   CHLORIDE  --  101 110* 108   CO2  --  26 25 22   ANIONGAP  --  6 6 9   GLC  --  102* 91 96   BUN  --  7 5* 7   CR  --  0.55 0.54 0.58   GFRESTIMATED  --  >90 >90 >90   GFRESTBLACK  --  >90 >90 >90   MICAH  --  8.7 8.6 9.2   PROTTOTAL  --   --   --  8.5   ALBUMIN  --   --   --  3.9   BILITOTAL 3.8*  --   --  4.6*   ALKPHOS  --   --   --  89   AST  --   --   --  131*   ALT  --   --   --  117*     CBC  Recent Labs   Lab 04/30/21  0548 04/29/21  0344 04/28/21  0848 04/28/21  0746   WBC 20.4* 19.1* 12.9* Canceled, Test credited   RBC 2.09* 2.28* 2.24* Canceled, Test credited   HGB 6.7* 7.2* 7.4* Canceled, Test credited   HCT 18.6* 20.3* 21.0* Canceled, Test credited   MCV 89 89 94 Canceled, Test credited   MCH 32.1 31.6 33.0 Canceled, Test credited   MCHC 36.0 35.5 35.2 Canceled, Test credited   RDW 22.3* 23.7* 24.6* Canceled, Test credited    298 280 Canceled, Test credited     INRNo lab results found in last 7 days.    Medications list for reference:  Current Facility-Administered Medications   Medication     acetaminophen (TYLENOL) tablet 650 mg     acetaminophen (TYLENOL) tablet 650 mg     albuterol (PROAIR HFA/PROVENTIL HFA/VENTOLIN HFA) 108 (90 Base) MCG/ACT inhaler 2 puff     albuterol (PROVENTIL) neb solution 2.5 mg     ARIPiprazole (ABILIFY) tablet 2 mg     azithromycin (ZITHROMAX) 500 mg in sodium chloride 0.9 % 250 mL intermittent infusion     cefTRIAXone (ROCEPHIN) 1 g vial to attach to  mL bag for ADULTS or NS 50 mL bag for PEDS     celecoxib (celeBREX) capsule 100 mg     deferasirox (JADENU) tablet 1,440 mg     diclofenac (VOLTAREN) 1 % topical gel 4 g     diphenhydrAMINE (BENADRYL) capsule 25 mg     enoxaparin ANTICOAGULANT (LOVENOX) injection 80 mg     fluticasone-vilanterol (BREO  ELLIPTA) 200-25 MCG/INH inhaler 1 puff     hydroxyurea (HYDREA) capsule 2,000 mg     Lidocaine (LIDOCARE) 4 % Patch 1 patch    And     lidocaine patch in PLACE     lidocaine (LMX4) cream     lidocaine 1 % 0.1-1 mL     melatonin tablet 1 mg     morphine  PCA 1 mg/mL OPIOID TOLERANT     ondansetron (ZOFRAN) injection 4-8 mg     Patient is already receiving anticoagulation with heparin, enoxaparin (LOVENOX), warfarin (COUMADIN)  or other anticoagulant medication     polyethylene glycol (MIRALAX) Packet 17 g     senna-docusate (SENOKOT-S/PERICOLACE) 8.6-50 MG per tablet 1 tablet    Or     senna-docusate (SENOKOT-S/PERICOLACE) 8.6-50 MG per tablet 2 tablet     sertraline (ZOLOFT) tablet 200 mg     sodium chloride (PF) 0.9% PF flush 3 mL     sodium chloride (PF) 0.9% PF flush 3 mL     sodium chloride 0.9% infusion

## 2021-04-30 NOTE — PROGRESS NOTES
[facility]    Medicine Progress Note - Hospitalist Service, Gold 4       Date of Admission:  4/26/2021  Assessment & Plan         Jennifer Cervantes is a 21 year old female with a past medical history of sickle cell anemia, CVA with residual RUE hemiparesis, iron overload 2/2 frequent transfusions, asthma, depression and anxiety who presents with worsening pain concerning for an acute sickle cell pain crisis.      1. Acute chest syndrome  2.  Acute Sickle Cell Crisis  3. Acute Pain secondary to #1  4. Sinus tachycardia secondary to #1  5. Acute hypoxic respiratory failure secondary to #1 - # 6  6. Worsening of bilateral PE with history of RUE DVT, on chronic anticoagulation  For the second night in a row, she required additional oxygen overnight up to 15L at one point, and is down to 6-7L now but continuing to report whole body pain, is quite lethargic and her hemoglobin is down to 6.7 today with WBC of 20. In discussion with Hematology, we will plan for the following today:     - Give transfusion through IJ. If more durable line access is needed, she is okay to move forward with this as BC from admission are negative  -Continue Ceftriaxone / Azithromcyin  -Continue Lovenox 1mg/kg q12h with checking a heparin xa level 3hours after her apixaban dose this morning. VQ scan yesterday had displayed worsening bilateral PE while she was previously on Eliquis.  -Continue hydroxyurea 2000mg daily  -Continue morphine PCA per pain plan              Continuous rate: 1mg/hr              PCA dose: 2mg               PCA lockout: 20 minutes              1 hour limit: 7mg      7. History of iron overload  -Continue home regimen of deferasirox 1440mg daily - it was verified with pharmacy that we are now able to give this from her hospital supply, but will need to get her home supply as soon as she is able to contact family/friends to bring it    8. Hx of CVA (2015) with residual RUE deficits    -Able to independently care for herself. No acute issues.      9. Depression/Anxiety  -No acute issues since admission. Continue home Zoloft 200mg daily and Abilify 2mg daily.     10. Asthma  -No acute issues since admission. Continue home Breo Ellipta and albuterol      Diet: Combination Diet Regular Diet Adult    DVT Prophylaxis: Eliquis  Lopez Catheter: not present  Code Status: Full Code         Disposition Plan   Expected discharge:   Entered: RONALDO Kaur 04/30/2021, 11:09 AM     RONALDO Kaur  Hospitalist Service, [address]  Contact information available via [facility] Paging/Directory  Please see sign in/sign out for up to date coverage information  ______________________________________________________________________    Interval History   Ms. Cervantes was resting in bed today on 13L nasal canula, working hard to breathe. She continues to report whole body pain. She had intermittent fevers overnight. We discussed plans for transfusion and an internal jugular line which she was originally hesitant to have, but now is open.     Data reviewed today: I reviewed all medications, new labs and imaging results over the last 24 hours.     Physical Exam   Vital Signs: Temp: 100.9  F (38.3  C) Temp src: Axillary BP: 117/62 Pulse: 117   Resp: 18 SpO2: 93 % O2 Device: Oxymask Oxygen Delivery: 8 LPM  Weight: 169 lbs 5.01 oz  General Appearance: 21 year old female resting in bed, reports pain 7/10 over whole body, pleasant in conversation  Respiratory: breathing comfortably on 13L of oxygen, no adventitious sounds to bilateral auscultation  Cardiovascular: regular rate and rhythm, no appreciable murmurs, rubs or gallops  GI: faint bowel sounds present, soft, minimally tender throughout, no rebound or guarding  Skin: warm, dry, no open sores, lesions or ulcerations       Data   Recent Labs   Lab 04/30/21  0548 04/29/21  0344 04/28/21  1850 04/28/21  0848  04/28/21  0746 04/26/21  1410   WBC 20.4* 19.1*  --  12.9* Canceled, Test credited 12.4*   HGB 6.7* 7.2*  --  7.4* Canceled, Test credited 7.6*   MCV 89 89  --  94 Canceled, Test credited 89    298  --  280 Canceled, Test credited 335   NA  --  134  --   --  140 139   POTASSIUM  --  3.8  --   --  4.2 3.7   CHLORIDE  --  101  --   --  110* 108   CO2  --  26  --   --  25 22   BUN  --  7  --   --  5* 7   CR  --  0.55  --   --  0.54 0.58   ANIONGAP  --  6  --   --  6 9   MICAH  --  8.7  --   --  8.6 9.2   GLC  --  102*  --   --  91 96   ALBUMIN  --   --   --   --   --  3.9   PROTTOTAL  --   --   --   --   --  8.5   BILITOTAL 3.8*  --   --   --   --  4.6*   ALKPHOS  --   --   --   --   --  89   ALT  --   --   --   --   --  117*   AST  --   --   --   --   --  131*   TROPI  --  <0.015 <0.015  --   --   --

## 2021-04-30 NOTE — PROVIDER NOTIFICATION
Provider notified of increased oxygen needs-15L and maintaining 90-91% RR 30-40s.       0625  Provider notified of hemoglobin 6.7-no new orders.

## 2021-04-30 NOTE — PROGRESS NOTES
Medicine XC note    Notified of rapid increase in 02 needs.  RR high, but very shallow breaths  Pt seen shortly afterwords, had already returned to previous 02 needs.  Says that did not feel more SOB  AddedCXR, scheduled Tylenol, VBG.    If any worsening, HFNC    Jered Carmen MD  Med-Peds Hospitalist

## 2021-04-30 NOTE — CONSULTS
Laboratory Medicine and Pathology  Transfusion Medicine - Apheresis Procedure    Jennifer Cervantes MRN# [id number]   YOB: 2000 Age: 21 year old        Reason for consult: Sickle cell anemia and acute chest syndrome           Assessment and Plan:   The patient is a 21 year old female with sickle cell anemia and worsening respiratory status concerning for acute chest syndrome. A red blood cell exchange has been requested. The patient has had this procedure in the past.  The potential risks and benefits of RBC exchange were reviewed with the patient. RBC transfusion is necessary with an RBC exchange and the risks of blood transfusion were discussed. All of her questions were answered.  Consent was obtained for the procedure and blood transfusion.    - RBC exchange to be performed emergently  - Patient will require a central line to be placed. Patient hesitant to have this catheter placed. I spoke with referring team who will follow up with patient regarding the need for the line.  Patient has a recently placed single lumen port, but is not adequate for the apheresis procedure.  - Hemoglobin 6.5 g/dL so will plan to use an RBC prime and procedural parameters to target post hematocrit of 22% and hemoglobin S <30%. (estimate 7 units total).  - RBC units to be fresh, hemoglobin S negative, Rh and K matched         Chief Complaint:   Back and chest pain         History of Present Illness:   The patient is a 21 year old female with sickle cell anemia. She was admitted on 4/26/2021 with worsening pain consistent with acute pain crisis.   She has a history of frequent pain crises and stroke Has has received chronic transfusions and has undergone RBC exchange transfusion in the past (most recent at this institution was 4/7/2020).  Her recent medical history is notable for multiple ED visits for pain, PE 2/1/2021, and right arm superficial venous thrombosis 3/25/2021. Lung perfusion scan on 4/27/2021  showed worsening of PE despite being on apixaban at the time of admission. She has been switched to enoxaparin.  She had a left internal jugular port removed and with replacement with a 8 Hungarian 25.5 cm single lumen power injectable port.  The site is  but healing well. She has had worsening respiratory symptoms since admission now requiring 15L by OxiPlus.  Has chest and back pain and feels short of breath with only occasional cough. No fevers, chills, nausea, vomiting diarrhea.    Past medical history and surgical history obtained from patient and medical record including [facility]. When asked about her medical history candace did not want to review these       Past Medical History:     Past Medical History:   Diagnosis Date     Anemia      Anxiety      Bleeding disorder (H)      Blood clotting disorder (H)      Cerebral infarction (H) 2015     Cognitive developmental delay     low IQ. 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 (H)     right hand contractures     Iron overload due to repeated red blood cell transfusions      Migraines      Multiple subsegmental pulmonary emboli without acute cor pulmonale (H) 02/01/2021     Oppositional defiant behavior      Superficial venous thrombosis of arm, right 03/25/2021     Uncomplicated asthma      From [facility]  ASCUS  Gastritis  Hydrosalpinx  Panic disorder  IUD  Chlamydia  Oppositional defiant disorder         Past Surgical History:     Past Surgical History:   Procedure Laterality Date     AS INSERT TUNNELED CV 2 CATH W/O PORT/PUMP       C BREAST REDUCTION (INCLUDES LIPO) TIER 3 Bilateral 04/23/2019     CHOLECYSTECTOMY       INSERT PORT VASCULAR ACCESS Left 4/21/2021    Procedure: INSERTION, VASCULAR ACCESS PORT (NOT SURE ON SIDE UNTIL REMOVAL);  Surgeon: Rajan More MD;  Location: [facility] OR     IR CHEST PORT PLACEMENT  > 5 YRS OF AGE  4/21/2021     IR CVC NON TUNNEL PLACEMENT  04/07/2020     IR PORT REMOVAL LEFT  4/21/2021     REMOVE PORT VASCULAR ACCESS Left 4/21/2021    Procedure: REMOVAL, VASCULAR ACCESS PORT LEFT;  Surgeon: Rajan More MD;  Location: [facility] OR     REPAIR TENDON ELBOW Right 10/02/2019    Procedure: Right Elbow Flexor Lengthening, Flexor Pronator Slide Of Wrist and Finger, Thumb Adductor Lengthening;  Surgeon: Anai Franco MD;  Location: [facility] OR     TONSILLECTOMY Bilateral 10/02/2019    Procedure: Bilateral Tonsillectomy;  Surgeon: Farhana Guy MD;  Location: [facility] OR          Social History:   Lives with mother and siblings         Immunizations:   Has not received COVID19 vaccine or had COVID19 infectoin         Allergies:   Allergies were reviewed with the patient  Allergies   Allergen Reactions     Contrast Dye      Hives and breathing issues     Fish-Derived Products Hives     Seafood Hives     Diagnostic X-Ray Materials      Gadolinium            Medications:     Current Facility-Administered Medications   Medication     acetaminophen (TYLENOL) tablet 650 mg     acetaminophen (TYLENOL) tablet 650 mg     albuterol (PROAIR HFA/PROVENTIL HFA/VENTOLIN HFA) 108 (90 Base) MCG/ACT inhaler 2 puff     albuterol (PROVENTIL) neb solution 2.5 mg     ARIPiprazole (ABILIFY) tablet 2 mg     azithromycin (ZITHROMAX) 500 mg in sodium chloride 0.9 % 250 mL intermittent infusion     cefTRIAXone (ROCEPHIN) 1 g vial to attach to  mL bag for ADULTS or NS 50 mL bag for PEDS     celecoxib (celeBREX) capsule 100 mg     deferasirox (JADENU) tablet 1,440 mg     diclofenac (VOLTAREN) 1 % topical gel 4 g     diphenhydrAMINE (BENADRYL) capsule 25 mg     enoxaparin ANTICOAGULANT (LOVENOX) injection 80 mg     fluticasone-vilanterol (BREO ELLIPTA) 200-25 MCG/INH inhaler 1 puff     hydroxyurea (HYDREA) capsule 2,000 mg     Lidocaine (LIDOCARE) 4 % Patch 1 patch    And     lidocaine patch in PLACE     lidocaine  (LMX4) cream     lidocaine 1 % 0.1-1 mL     melatonin tablet 1 mg     morphine  PCA 1 mg/mL OPIOID TOLERANT     ondansetron (ZOFRAN) injection 4-8 mg     Patient is already receiving anticoagulation with heparin, enoxaparin (LOVENOX), warfarin (COUMADIN)  or other anticoagulant medication     polyethylene glycol (MIRALAX) Packet 17 g     senna-docusate (SENOKOT-S/PERICOLACE) 8.6-50 MG per tablet 1 tablet    Or     senna-docusate (SENOKOT-S/PERICOLACE) 8.6-50 MG per tablet 2 tablet     sertraline (ZOLOFT) tablet 200 mg     sodium chloride (PF) 0.9% PF flush 3 mL     sodium chloride (PF) 0.9% PF flush 3 mL     sodium chloride 0.9% infusion           Review of Systems:   See above         Exam:   T 98.2 RR 32 P 110 /74 O2 sat 98% on 15 LPM OxiPlus  Resting, but appears uncomfortable  Becomes short of breath when talking  No jaundice or scleral icterus  Answers/asks questions appropriately  Left PIV  Healing surgical site on left chest          Data:     Results for orders placed or performed during the hospital encounter of 04/26/21 (from the past 24 hour(s))   Apixaban   Result Value Ref Range    Apixaban 24 ng/mL   Heparin Unfractionated Anti Xa Level   Result Value Ref Range    Heparin Unfractionated Anti Xa Level 0.37 IU/mL   Blood culture    Specimen: Blood    Left Hand   Result Value Ref Range    Specimen Description Blood Left Hand     Culture Micro No growth after 9 hours    UA with Microscopic reflex to Culture    Specimen: Urine clean catch   Result Value Ref Range    Color Urine Yellow     Appearance Urine Clear     Glucose Urine Negative NEG^Negative mg/dL    Bilirubin Urine Negative NEG^Negative    Ketones Urine Negative NEG^Negative mg/dL    Specific Gravity Urine 1.011 1.003 - 1.035    Blood Urine Negative NEG^Negative    pH Urine 6.0 5.0 - 7.0 pH    Protein Albumin Urine Negative NEG^Negative mg/dL    Urobilinogen mg/dL 12.0 (H) 0.0 - 2.0 mg/dL    Nitrite Urine Negative NEG^Negative    Leukocyte  Esterase Urine Small (A) NEG^Negative    Source Clean catch urine     WBC Urine 6 (H) 0 - 5 /HPF    RBC Urine 2 0 - 2 /HPF    Bacteria Urine Few (A) NEG^Negative /HPF    Squamous Epithelial /HPF Urine 5 (H) 0 - 1 /HPF    Mucous Urine Present (A) NEG^Negative /LPF   Blood culture    Specimen: Blood    Right Hand   Result Value Ref Range    Specimen Description Blood Right Hand     Culture Micro No growth after 8 hours    CBC with platelets differential   Result Value Ref Range    WBC 20.4 (H) 4.0 - 11.0 10e9/L    RBC Count 2.09 (L) 3.8 - 5.2 10e12/L    Hemoglobin 6.7 (LL) 11.7 - 15.7 g/dL    Hematocrit 18.6 (L) 35.0 - 47.0 %    MCV 89 78 - 100 fl    MCH 32.1 26.5 - 33.0 pg    MCHC 36.0 31.5 - 36.5 g/dL    RDW 22.3 (H) 10.0 - 15.0 %    Platelet Count 286 150 - 450 10e9/L    Diff Method Automated Method     % Neutrophils 76.5 %    % Lymphocytes 10.4 %    % Monocytes 9.4 %    % Eosinophils 2.3 %    % Basophils 0.5 %    % Immature Granulocytes 0.9 %    Nucleated RBCs 1 (H) 0 /100    Absolute Neutrophil 15.6 (H) 1.6 - 8.3 10e9/L    Absolute Lymphocytes 2.1 0.8 - 5.3 10e9/L    Absolute Monocytes 1.9 (H) 0.0 - 1.3 10e9/L    Absolute Eosinophils 0.5 0.0 - 0.7 10e9/L    Absolute Basophils 0.1 0.0 - 0.2 10e9/L    Abs Immature Granulocytes 0.2 0 - 0.4 10e9/L    Absolute Nucleated RBC 0.2    Bilirubin  total   Result Value Ref Range    Bilirubin Total 3.8 (H) 0.2 - 1.3 mg/dL   Blood gas venous   Result Value Ref Range    Ph Venous 7.37 7.32 - 7.43 pH    PCO2 Venous 48 40 - 50 mm Hg    PO2 Venous 35 25 - 47 mm Hg    Bicarbonate Venous 28 21 - 28 mmol/L    Base Excess Venous 2.1 mmol/L    FIO2 98%    Lactate for Sepsis Protocol   Result Value Ref Range    Lactate for Sepsis Protocol 0.9 0.7 - 2.0 mmol/L   HCG quantitative pregnancy   Result Value Ref Range    HCG Quantitative Serum <1 0 - 5 IU/L   ABO/Rh type and screen   Result Value Ref Range    ABO PENDING     Antibody Screen PENDING     Test Valid Only At          [facility]    Specimen Expires 05/03/2021    XR Chest Port 1 View    Narrative    XR CHEST PORT 1 VIEW  4/30/2021 5:55 AM      HISTORY: H/o PE, sickle cell, now with acute chest vs pneumonia last  1-2 days and progressively worsening breathing    COMPARISON: 4/29/2021, 4/28/2021, 4/25/2021    FINDINGS: AP view of the chest. Left chest port tip is at the low SVC.  Cardiac silhouette is stable. Increased hazy opacities bilaterally,  right greater than left. Likely component of layering pleural  effusions. No pneumothorax.      Impression    IMPRESSION: Ultrasound could help delineate how much of these  opacities are related to the effusions. Consolidation from pneumonia  in the lower lobes should be the prime consideration. Edema felt less  likely.    I have personally reviewed the examination and initial interpretation  and I agree with the findings.    BRENDA CURTIS MD     ATTESTATION STATEMENT:  This patient has been seen and evaluated by me directly, Uyen Munoz MD, PhD.    Uyen Munoz M.D., Ph.D.  Attending Physician  Division of Transfusion Medicine  Department of Laboratory [place] [address]

## 2021-04-30 NOTE — PROCEDURES
[facility]    Procedure: IR Procedure Note    Date/Time: 4/30/2021 3:46 PM  Performed by: Kala Hopper MD  Authorized by: Kala Hopper MD   IR Fellow Physician:  Radiology Resident Physician: DIONICIO Hopper MD.   Other(s) attending procedure: CLARITA Walton MD.     UNIVERSAL PROTOCOL   Site Marked: NA  Prior Images Obtained and Reviewed:  Yes  Required items: Required blood products, implants, devices and special equipment available    Patient identity confirmed:  Verbally with patient, arm band, provided demographic data and hospital-assigned identification number  Patient was reevaluated immediately before administering moderate or deep sedation or anesthesia  Confirmation Checklist:  Patient's identity using two indicators, relevant allergies, procedure was appropriate and matched the consent or emergent situation and correct equipment/implants were available  Time out: Immediately prior to the procedure a time out was called    Universal Protocol: the Joint Commission Universal Protocol was followed    Preparation: Patient was prepped and draped in usual sterile fashion           ANESTHESIA    Anesthesia: Local infiltration  Local Anesthetic:  Lidocaine 1% without epinephrine      SEDATION    Patient Sedated: No    See dictated procedure note for full details.  Findings: Patent left common femoral vein.     Specimens: none    Complications: None    Condition: Stable    Plan: -Resume inpatient care.     PROCEDURE   Patient Tolerance:  Patient tolerated the procedure well with no immediate complications  Describe Procedure: -Image guided non-tunneled line placement.   Length of time physician/provider present for 1:1 monitoring during sedation: 25

## 2021-04-30 NOTE — IR NOTE
Patient Name: Jennifer Cervantes  Medical Record Number: [id number]  Today's Date: 4/30/2021    Procedure: non-tunneled central venous catheter placement  Proceduralist: Dr DIONICIO Hopper, Dr CLARITA Walton    Sedation Notes: Benadryl 50 mg IV, lidocaine at site     Procedure start time: 1512  Procedure end time: 1540    Report given to: Suraj DIAZ   : none    Other Notes: Pt arrived to IR room 2 from . Consent reviewed, pt confirmed. Pt denies any questions or concerns regarding procedure. Pt positioned supine and monitored per protocol. Left groin prepped. Pt reported extreme claustrophobia with drapes over face and became angry and combative. Site cleansed and dressed per protocol. Pt tolerated procedure without any noted complications. Pt transferred back to .

## 2021-04-30 NOTE — CONSULTS
Interventional Radiology Consult Service Note    Patient is on IR schedule 4/30 for a NTCVC placement for red blood cell exchange.   Labs WNL for procedure. COVID neg.  No NPO required.  Consent will be done prior to procedure.     Please contact the IR [place] RN at [phone] for estimated time of procedure.     Case discussed with Caron Shaffer PA-C. 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 Plan to proceed with red blood cell exchange so IR is consulted for NTCVC placement. Unlikely to be candidate for RIJ NTCVC placement given hx of innominate occlusion. Pt does have a LIJ chest port in place. LIJ NTCVC placement vs femoral line.    Expected date of discharge: TBD    Vitals:   /62 (BP Location: Left arm)   Pulse 117   Temp 100.9  F (38.3  C) (Axillary)   Resp 18   Wt 76.8 kg (169 lb 5 oz)   SpO2 93%   BMI 29.06 kg/m      Pertinent Labs:     Lab Results   Component Value Date    WBC 20.4 (H) 04/30/2021    WBC 19.1 (H) 04/29/2021    WBC 12.9 (H) 04/28/2021       Lab Results   Component Value Date    HGB 6.7 04/30/2021    HGB 7.2 04/29/2021    HGB 7.4 04/28/2021       Lab Results   Component Value Date     04/30/2021     04/29/2021     04/28/2021       Lab Results   Component Value Date    INR 1.28 (H) 04/03/2021     (HH) 04/03/2021       Lab Results   Component Value Date    POTASSIUM 3.8 04/29/2021        EITAN Hedrick CNP  Interventional Radiology  Pager: [phone]

## 2021-04-30 NOTE — PROGRESS NOTES
Patient is alert, oriented x 4, reports good pain control with Morphine PCA, denies nausea. Still complaining of shortness of breath, RR 20-30s, -130s, O2 sats 95% on oxymask 6LPM,, desats to low 80s % on room air. MD came to assess patient, plan is to transfer to . PIV infusing. Voiding spontaneously in commode. Continuous capnography. Report called to Halle from . Pt transferred to , all belongings sent with patient. Pt declined scheduled PM meds, would like to take her meds later tonight, 6B RN updated.

## 2021-04-30 NOTE — PROGRESS NOTES
Transfer  Transferred from:   Via:bed  Reason for transfer: Pt appropriate for 6B- worsened patient condition  Family: Pt asked who should be notified and they declined notifying family.  Belongings: Received with pt  Chart: Received with pt  Medications: Meds received from old unit with pt  Code Status verified on armband: yes  2 RN Skin Assessment Completed By: Katie ARMIJO  Med rec completed: yes  Bed surface reassessed with algorithm and charted: yes  New bed surface ordered: no  Suction/Ambu bag/Flowmeter at bedside: yes    Report received from:   Pt status: stable

## 2021-05-01 LAB
ANION GAP SERPL CALCULATED.3IONS-SCNC: 4 MMOL/L (ref 3–14)
BLD PROD TYP BPU: NORMAL
BLD UNIT ID BPU: 0
BLOOD PRODUCT CODE: NORMAL
BPU ID: NORMAL
BUN SERPL-MCNC: 5 MG/DL (ref 7–30)
CALCIUM SERPL-MCNC: 8 MG/DL (ref 8.5–10.1)
CHLORIDE SERPL-SCNC: 105 MMOL/L (ref 94–109)
CO2 SERPL-SCNC: 28 MMOL/L (ref 20–32)
CREAT SERPL-MCNC: 0.52 MG/DL (ref 0.52–1.04)
ERYTHROCYTE [DISTWIDTH] IN BLOOD BY AUTOMATED COUNT: 19.1 % (ref 10–15)
FIBRINOGEN PPP-MCNC: 489 MG/DL (ref 200–420)
GFR SERPL CREATININE-BSD FRML MDRD: >90 ML/MIN/{1.73_M2}
GLUCOSE SERPL-MCNC: 91 MG/DL (ref 70–99)
HCT VFR BLD AUTO: 19.8 % (ref 35–47)
HGB BLD-MCNC: 6.7 G/DL (ref 11.7–15.7)
LACTATE BLD-SCNC: 0.4 MMOL/L (ref 0.7–2)
MCH RBC QN AUTO: 29.5 PG (ref 26.5–33)
MCHC RBC AUTO-ENTMCNC: 33.8 G/DL (ref 31.5–36.5)
MCV RBC AUTO: 87 FL (ref 78–100)
PLATELET # BLD AUTO: 175 10E9/L (ref 150–450)
POTASSIUM SERPL-SCNC: 3.7 MMOL/L (ref 3.4–5.3)
RBC # BLD AUTO: 2.27 10E12/L (ref 3.8–5.2)
RETICS # AUTO: 308.3 10E9/L (ref 25–95)
RETICS/RBC NFR AUTO: 13.6 % (ref 0.5–2)
SODIUM SERPL-SCNC: 137 MMOL/L (ref 133–144)
TRANSFUSION STATUS PATIENT QL: NORMAL
WBC # BLD AUTO: 14.4 10E9/L (ref 4–11)

## 2021-05-01 PROCEDURE — 85045 AUTOMATED RETICULOCYTE COUNT: CPT | Performed by: PHYSICIAN ASSISTANT

## 2021-05-01 PROCEDURE — 250N000011 HC RX IP 250 OP 636: Performed by: PEDIATRICS

## 2021-05-01 PROCEDURE — 36415 COLL VENOUS BLD VENIPUNCTURE: CPT | Performed by: PHYSICIAN ASSISTANT

## 2021-05-01 PROCEDURE — 258N000003 HC RX IP 258 OP 636: Performed by: NURSE PRACTITIONER

## 2021-05-01 PROCEDURE — 120N000003 HC R&B IMCU UMMC

## 2021-05-01 PROCEDURE — 99233 SBSQ HOSP IP/OBS HIGH 50: CPT | Performed by: STUDENT IN AN ORGANIZED HEALTH CARE EDUCATION/TRAINING PROGRAM

## 2021-05-01 PROCEDURE — 83605 ASSAY OF LACTIC ACID: CPT | Performed by: STUDENT IN AN ORGANIZED HEALTH CARE EDUCATION/TRAINING PROGRAM

## 2021-05-01 PROCEDURE — 36415 COLL VENOUS BLD VENIPUNCTURE: CPT | Performed by: STUDENT IN AN ORGANIZED HEALTH CARE EDUCATION/TRAINING PROGRAM

## 2021-05-01 PROCEDURE — 250N000011 HC RX IP 250 OP 636: Performed by: NURSE PRACTITIONER

## 2021-05-01 PROCEDURE — 258N000003 HC RX IP 258 OP 636: Performed by: PHYSICIAN ASSISTANT

## 2021-05-01 PROCEDURE — 250N000013 HC RX MED GY IP 250 OP 250 PS 637: Performed by: PHYSICIAN ASSISTANT

## 2021-05-01 PROCEDURE — 999N000043 HC STATISTIC CTO2 CONT OXYGEN TECH TIME EA 90 MIN

## 2021-05-01 PROCEDURE — 99207 PR APP CREDIT; MD BILLING SHARED VISIT: CPT | Performed by: PHYSICIAN ASSISTANT

## 2021-05-01 PROCEDURE — 85384 FIBRINOGEN ACTIVITY: CPT | Performed by: PHYSICIAN ASSISTANT

## 2021-05-01 PROCEDURE — 250N000013 HC RX MED GY IP 250 OP 250 PS 637: Performed by: PEDIATRICS

## 2021-05-01 PROCEDURE — 80048 BASIC METABOLIC PNL TOTAL CA: CPT | Performed by: PHYSICIAN ASSISTANT

## 2021-05-01 PROCEDURE — 85027 COMPLETE CBC AUTOMATED: CPT | Performed by: PHYSICIAN ASSISTANT

## 2021-05-01 PROCEDURE — 250N000011 HC RX IP 250 OP 636: Performed by: PHYSICIAN ASSISTANT

## 2021-05-01 PROCEDURE — 999N000215 HC STATISTIC HFNC ADULT NON-CPAP

## 2021-05-01 RX ORDER — HEPARIN SODIUM 1000 [USP'U]/ML
3 INJECTION, SOLUTION INTRAVENOUS; SUBCUTANEOUS ONCE
Status: DISCONTINUED | OUTPATIENT
Start: 2021-05-01 | End: 2021-05-03

## 2021-05-01 RX ADMIN — CELECOXIB 100 MG: 100 CAPSULE ORAL at 23:33

## 2021-05-01 RX ADMIN — ENOXAPARIN SODIUM 80 MG: 80 INJECTION SUBCUTANEOUS at 03:41

## 2021-05-01 RX ADMIN — DEFERASIROX 1440 MG: 360 TABLET ORAL at 19:58

## 2021-05-01 RX ADMIN — FLUTICASONE FUROATE AND VILANTEROL TRIFENATATE 1 PUFF: 200; 25 POWDER RESPIRATORY (INHALATION) at 19:58

## 2021-05-01 RX ADMIN — ENOXAPARIN SODIUM 80 MG: 80 INJECTION SUBCUTANEOUS at 15:16

## 2021-05-01 RX ADMIN — HYDROXYUREA 2000 MG: 500 CAPSULE ORAL at 09:50

## 2021-05-01 RX ADMIN — SERTRALINE HYDROCHLORIDE 200 MG: 100 TABLET ORAL at 09:48

## 2021-05-01 RX ADMIN — ACETAMINOPHEN 650 MG: 325 TABLET, FILM COATED ORAL at 15:15

## 2021-05-01 RX ADMIN — ACETAMINOPHEN 650 MG: 325 TABLET, FILM COATED ORAL at 09:49

## 2021-05-01 RX ADMIN — CEFTRIAXONE 1 G: 1 INJECTION, POWDER, FOR SOLUTION INTRAMUSCULAR; INTRAVENOUS at 18:11

## 2021-05-01 RX ADMIN — DICLOFENAC SODIUM 4 G: 10 GEL TOPICAL at 13:21

## 2021-05-01 RX ADMIN — CELECOXIB 100 MG: 100 CAPSULE ORAL at 11:45

## 2021-05-01 RX ADMIN — AZITHROMYCIN MONOHYDRATE 500 MG: 500 INJECTION, POWDER, LYOPHILIZED, FOR SOLUTION INTRAVENOUS at 22:11

## 2021-05-01 RX ADMIN — SODIUM CHLORIDE, POTASSIUM CHLORIDE, SODIUM LACTATE AND CALCIUM CHLORIDE: 600; 310; 30; 20 INJECTION, SOLUTION INTRAVENOUS at 03:41

## 2021-05-01 RX ADMIN — Medication: at 08:43

## 2021-05-01 RX ADMIN — ACETAMINOPHEN 650 MG: 325 TABLET, FILM COATED ORAL at 18:09

## 2021-05-01 RX ADMIN — LIDOCAINE 1 PATCH: 560 PATCH PERCUTANEOUS; TOPICAL; TRANSDERMAL at 19:58

## 2021-05-01 RX ADMIN — ARIPIPRAZOLE 2 MG: 2 TABLET ORAL at 09:48

## 2021-05-01 RX ADMIN — ACETAMINOPHEN 650 MG: 325 TABLET, FILM COATED ORAL at 22:11

## 2021-05-01 RX ADMIN — DICLOFENAC SODIUM 4 G: 10 GEL TOPICAL at 19:58

## 2021-05-01 ASSESSMENT — ACTIVITIES OF DAILY LIVING (ADL)
ADLS_ACUITY_SCORE: 15

## 2021-05-01 NOTE — PROCEDURES
Laboratory Medicine and Pathology  Transfusion Medicine - Apheresis Procedure    Jennifer Cervantes MRN# [id number]   YOB: 2000 Age: 21 year old        Reason for consult: Sickle cell anemia and acute chest syndrome           Assessment and Plan:   The patient is a 21 year old female with sickle cell anemia and acute chest syndrome who underwent red blood cell exchange. She tolerated the procedure well. Pre-hemoglobin drawn at start of procedure down from earlier in the day. Blood Bank currently has immediately available 1 addition RBC unit for transfusion. Additional time is required to obtain units for this patient as per protocol the Blood Bank will attempt to obtain fresh, Hemoglobin S negative, Rh and K matched units if clinical condition permits.         Data:   Pre procedure  Hemoglobin and hematocrit   Result Value Ref Range    Hemoglobin 6.1 (LL) 11.7 - 15.7 g/dL    Hematocrit 17.1 (L) 35.0 - 47.0 %     Post procedure - pending         Procedure Summary:   A red blood cell exchange was performed with a Spectra Optia cell separator.  The vascular access was a left femoral vein catheter placed today.  ACD-A was used for anticoagulation. A red blood cell prime was used due to low pre procedure hemoglobin. The replacement fluid was 6 units of red blood cells (7 units total including prime).  The RBC units were freshest available, Hemoglobin S negative, Rh and K matched, leukocyte reduced. She was given 650 mg PO acetaminophen at 17:48 and 50 mg IV diphenhydramine at 15:35 before the start of the procedure.  Thhe patient's pre-procedure temperature was elevated and she complained of being warm, so the blood warmer was not used. The patient's was stable throughout.  The patient tolerated the procedure well.    ATTESTATION STATEMENT:  This patient has been seen and evaluated by me directly, Uyen Munoz MD, PhD.    Uyen Munoz M.D., Ph.D.  Attending Physician  Division of  Transfusion Medicine  Department of Laboratory Medicine [place] [address]

## 2021-05-01 NOTE — PROVIDER NOTIFICATION
Time of notification: 9:03 PM  Provider notified: hgb 6.8  Patient status: Stable  Temp:  [98.2  F (36.8  C)-101.4  F (38.6  C)] 98.8  F (37.1  C)  Pulse:  [102-128] 118  Resp:  [18-45] 35  BP: (115-145)/(58-83) 128/77  FiO2 (%):  [50 %] 50 %  SpO2:  [91 %-100 %] 91 %  Orders received: MD discussed with onc and will wait to transfuse.

## 2021-05-01 NOTE — PLAN OF CARE
Neuro: A&Ox4, pleasant. Weakness on right side of body from previous stroke.   Cardiac: SR-ST. HR 90-110s VSS.      Respiratory: Sating >92 on 8-12L oxymask. HFNC utilized but pt frequenty pulled off in sleep. RR 20-40s-shallow breathing. Desats while sleeping.  GI/: Adequate urine output. No BM, refused bowel meds  Diet/appetite: Tolerating regular diet. Fair/poor appetite.  Activity:  Assist of 1-2, used bedpan after procedure.  Pain: PCA and lidocaine patch managing pain well. At acceptable level on current regimen.   Skin: No new deficits noted.  LDA's: L port: de-accessed, R PIV x2, L groin CVC.     Plan: Triggered SIRS at 0600. Monitor hgb. Continue with POC. Notify primary team with changes

## 2021-05-01 NOTE — PROVIDER NOTIFICATION
Time of notification: 7:12 PM  Provider notified: hgb 6.1  Patient status: Stable and RBC exchange is in progress  Temp:  [98.2  F (36.8  C)-101.5  F (38.6  C)] 101.4  F (38.6  C)  Pulse:  [102-128] 116  Resp:  [18-36] 20  BP: (115-145)/(58-83) 127/65  FiO2 (%):  [50 %] 50 %  SpO2:  [91 %-100 %] 91 %  Orders received:

## 2021-05-01 NOTE — PROGRESS NOTES
[facility]    Medicine Progress Note - Hospitalist Service, Gold 4       Date of Admission:  4/26/2021  Assessment & Plan         Jennifer Cervantes is a 21 year old female with a past medical history of sickle cell anemia, CVA with residual RUE hemiparesis, iron overload 2/2 frequent transfusions, asthma, depression and anxiety who presents with worsening pain concerning for an acute sickle cell pain crisis.      1. Acute chest syndrome s/p transfusion (4/30/21)  2.  Acute Sickle Cell Crisis  3. Acute Pain secondary to #1  4. Sinus tachycardia secondary to #1  5. Acute hypoxic respiratory failure secondary to #1 - # 6  6. Worsening of bilateral PE with history of RUE DVT, on chronic anticoagulation  After getting a transfusion late yesterday, her oxygen saturation improved from 20L down to 15L this morning and she is breathing a little more comfortably. She is still having whole body pain and using her morphine pca frequently.     -Continue Ceftriaxone / Azithromcyin  -Continue Lovenox 1mg/kg q12h with checking a heparin xa level 3hours after her apixaban dose this morning. VQ scan yesterday had displayed worsening bilateral PE while she was previously on Eliquis.  -Continue hydroxyurea 2000mg daily  -Continue morphine PCA per pain plan              Continuous rate: 1mg/hr              PCA dose: 2mg               PCA lockout: 20 minutes              1 hour limit: 7mg      7. History of iron overload  -Continue home regimen of deferasirox 1440mg daily - it was verified with pharmacy to be used short term in the hospital but will need to get her home supply as soon as she is able to contact family/friends to bring it    8. Hx of CVA (2015) with residual RUE deficits   -Able to independently care for herself. No acute issues.      9. Depression/Anxiety  -No acute issues since admission. Continue home Zoloft 200mg daily and Abilify 2mg daily.     10. Asthma  -No acute issues  "since admission. Continue home Breo Ellipta and albuterol      Diet: Combination Diet Regular Diet Adult    DVT Prophylaxis: Eliquis  Lopez Catheter: not present  Code Status: Full Code         Disposition Plan   Expected discharge:   Entered: RONALDO Kaur 05/01/2021, 11:29 AM     RONALDO Kaur  Hospitalist Service, [address]  Contact information available via [facility] Paging/Directory  Please see sign in/sign out for up to date coverage information  ______________________________________________________________________    Interval History   Ms. Cervantes was resting in bed today still feeling exhausted but a little better than yesterday. She is not happy about the internal jugular in her neck but understands that is a temporarily necessary. She reports whole body pain, but pain is \"okay for now\".  She denies any chest pain, does not have    Data reviewed today: I reviewed all medications, new labs and imaging results over the last 24 hours.     Physical Exam   Vital Signs: Temp: 97.7  F (36.5  C) Temp src: Oral BP: 112/65 Pulse: 94   Resp: (!) 40 SpO2: 94 % O2 Device: Oxi Plus Oxygen Delivery: 15 LPM  Weight: 169 lbs 12.07 oz  General Appearance: 21 year old female resting in bed, reports pain 8/10 over whole body, pleasant in conversation  Respiratory: breathing comfortably on 13L of oxygen, no adventitious sounds to bilateral auscultation  Cardiovascular: regular rate and rhythm, no appreciable murmurs, rubs or gallops  GI: faint bowel sounds present, soft, minimally tender throughout, no rebound or guarding  Skin: warm, dry, no open sores, lesions or ulcerations       Data   Recent Labs   Lab 05/01/21  0539 04/30/21  1955 04/30/21  1815 04/30/21  0548 04/29/21  0344 04/28/21  1850 04/28/21  0746 04/28/21  0746 04/26/21  1410   WBC 14.4*  --   --  20.4* 19.1*  --    < > Canceled, Test credited 12.4*   HGB 6.7* 6.8* 6.1* 6.7* 7.2*  --    < > Canceled, " Test credited 7.6*   MCV 87  --   --  89 89  --    < > Canceled, Test credited 89     --   --  286 298  --    < > Canceled, Test credited 335     --   --   --  134  --   --  140 139   POTASSIUM 3.7  --   --   --  3.8  --   --  4.2 3.7   CHLORIDE 105  --   --   --  101  --   --  110* 108   CO2 28  --   --   --  26  --   --  25 22   BUN 5*  --   --   --  7  --   --  5* 7   CR 0.52  --   --   --  0.55  --   --  0.54 0.58   ANIONGAP 4  --   --   --  6  --   --  6 9   MICAH 8.0*  --   --   --  8.7  --   --  8.6 9.2   GLC 91  --   --   --  102*  --   --  91 96   ALBUMIN  --   --   --   --   --   --   --   --  3.9   PROTTOTAL  --   --   --   --   --   --   --   --  8.5   BILITOTAL  --   --   --  3.8*  --   --   --   --  4.6*   ALKPHOS  --   --   --   --   --   --   --   --  89   ALT  --   --   --   --   --   --   --   --  117*   AST  --   --   --   --   --   --   --   --  131*   TROPI  --   --   --   --  <0.015 <0.015  --   --   --     < > = values in this interval not displayed.

## 2021-05-01 NOTE — PROVIDER NOTIFICATION
Time of notification: 6:37 AM  Provider notified: hgb 6.7  Patient status:stable  Temp:  [97.8  F (36.6  C)-101.4  F (38.6  C)] 97.8  F (36.6  C)  Pulse:  [] 90  Resp:  [18-45] 26  BP: (117-145)/(62-83) 125/72  FiO2 (%):  [30 %-50 %] 50 %  SpO2:  [91 %-100 %] 93 %  Orders received: none at this time.

## 2021-05-01 NOTE — PLAN OF CARE
Temp: 98.9  F (37.2  C) Temp src: Oral BP: 134/74 Pulse: 115   Resp: 29 SpO2: 97 % O2 Device: Oxi Plus Oxygen Delivery: 20 LPM     Neuro: A&Ox4. Can make needs known  Cardiac: ST, HR 115s. VSS. Afebrile   Respiratory: Sating >90% on 15-20L mask  GI/: Adequate urine output.   Diet/appetite: Tolerating regular diet. Poor appetite   Activity:  Assist of 1, up to bedside commode  Pain: At acceptable level on current regimen. Back pain-PRN voltaren applied, heat pad. Scheduled tylenol given. Morphine PCA in place  Skin: No new deficits noted. See flowsheets  LDA's: R PIV x2  L groin CVC    Plan: Continue with POC. Notify primary team with changes.

## 2021-05-02 ENCOUNTER — APPOINTMENT (OUTPATIENT)
Dept: CARDIOLOGY | Facility: CLINIC | Age: 21
DRG: 811 | End: 2021-05-02
Attending: STUDENT IN AN ORGANIZED HEALTH CARE EDUCATION/TRAINING PROGRAM
Payer: COMMERCIAL

## 2021-05-02 ENCOUNTER — APPOINTMENT (OUTPATIENT)
Dept: GENERAL RADIOLOGY | Facility: CLINIC | Age: 21
DRG: 811 | End: 2021-05-02
Attending: STUDENT IN AN ORGANIZED HEALTH CARE EDUCATION/TRAINING PROGRAM
Payer: COMMERCIAL

## 2021-05-02 LAB
ANION GAP SERPL CALCULATED.3IONS-SCNC: 6 MMOL/L (ref 3–14)
BUN SERPL-MCNC: 4 MG/DL (ref 7–30)
CALCIUM SERPL-MCNC: 8.6 MG/DL (ref 8.5–10.1)
CHLORIDE SERPL-SCNC: 106 MMOL/L (ref 94–109)
CO2 SERPL-SCNC: 28 MMOL/L (ref 20–32)
CREAT SERPL-MCNC: 0.51 MG/DL (ref 0.52–1.04)
ERYTHROCYTE [DISTWIDTH] IN BLOOD BY AUTOMATED COUNT: 21.1 % (ref 10–15)
FIBRINOGEN PPP-MCNC: 575 MG/DL (ref 200–420)
GFR SERPL CREATININE-BSD FRML MDRD: >90 ML/MIN/{1.73_M2}
GLUCOSE SERPL-MCNC: 90 MG/DL (ref 70–99)
HCT VFR BLD AUTO: 20.9 % (ref 35–47)
HGB BLD-MCNC: 6.8 G/DL (ref 11.7–15.7)
LACTATE BLD-SCNC: 0.5 MMOL/L (ref 0.7–2)
LMWH PPP CHRO-ACNC: 0.73 IU/ML
MCH RBC QN AUTO: 28.6 PG (ref 26.5–33)
MCHC RBC AUTO-ENTMCNC: 32.5 G/DL (ref 31.5–36.5)
MCV RBC AUTO: 88 FL (ref 78–100)
PLATELET # BLD AUTO: 250 10E9/L (ref 150–450)
POTASSIUM SERPL-SCNC: 3.6 MMOL/L (ref 3.4–5.3)
RBC # BLD AUTO: 2.38 10E12/L (ref 3.8–5.2)
RETICS # AUTO: 390.3 10E9/L (ref 25–95)
RETICS/RBC NFR AUTO: 16.4 % (ref 0.5–2)
SODIUM SERPL-SCNC: 140 MMOL/L (ref 133–144)
WBC # BLD AUTO: 17.3 10E9/L (ref 4–11)

## 2021-05-02 PROCEDURE — 36415 COLL VENOUS BLD VENIPUNCTURE: CPT | Performed by: STUDENT IN AN ORGANIZED HEALTH CARE EDUCATION/TRAINING PROGRAM

## 2021-05-02 PROCEDURE — 71045 X-RAY EXAM CHEST 1 VIEW: CPT

## 2021-05-02 PROCEDURE — 99233 SBSQ HOSP IP/OBS HIGH 50: CPT | Performed by: STUDENT IN AN ORGANIZED HEALTH CARE EDUCATION/TRAINING PROGRAM

## 2021-05-02 PROCEDURE — 250N000011 HC RX IP 250 OP 636: Performed by: PEDIATRICS

## 2021-05-02 PROCEDURE — 36415 COLL VENOUS BLD VENIPUNCTURE: CPT | Performed by: PHYSICIAN ASSISTANT

## 2021-05-02 PROCEDURE — 250N000009 HC RX 250: Performed by: PHYSICIAN ASSISTANT

## 2021-05-02 PROCEDURE — 85045 AUTOMATED RETICULOCYTE COUNT: CPT | Performed by: PHYSICIAN ASSISTANT

## 2021-05-02 PROCEDURE — 36415 COLL VENOUS BLD VENIPUNCTURE: CPT | Performed by: INTERNAL MEDICINE

## 2021-05-02 PROCEDURE — 83605 ASSAY OF LACTIC ACID: CPT | Performed by: INTERNAL MEDICINE

## 2021-05-02 PROCEDURE — 85027 COMPLETE CBC AUTOMATED: CPT | Performed by: PHYSICIAN ASSISTANT

## 2021-05-02 PROCEDURE — 250N000011 HC RX IP 250 OP 636: Performed by: PHYSICIAN ASSISTANT

## 2021-05-02 PROCEDURE — 93306 TTE W/DOPPLER COMPLETE: CPT | Mod: 26 | Performed by: INTERNAL MEDICINE

## 2021-05-02 PROCEDURE — 250N000013 HC RX MED GY IP 250 OP 250 PS 637: Performed by: PHYSICIAN ASSISTANT

## 2021-05-02 PROCEDURE — 250N000011 HC RX IP 250 OP 636: Performed by: NURSE PRACTITIONER

## 2021-05-02 PROCEDURE — 94640 AIRWAY INHALATION TREATMENT: CPT | Mod: 76

## 2021-05-02 PROCEDURE — 85520 HEPARIN ASSAY: CPT | Performed by: STUDENT IN AN ORGANIZED HEALTH CARE EDUCATION/TRAINING PROGRAM

## 2021-05-02 PROCEDURE — 120N000003 HC R&B IMCU UMMC

## 2021-05-02 PROCEDURE — 250N000013 HC RX MED GY IP 250 OP 250 PS 637: Performed by: PEDIATRICS

## 2021-05-02 PROCEDURE — 71045 X-RAY EXAM CHEST 1 VIEW: CPT | Mod: 26 | Performed by: STUDENT IN AN ORGANIZED HEALTH CARE EDUCATION/TRAINING PROGRAM

## 2021-05-02 PROCEDURE — 80048 BASIC METABOLIC PNL TOTAL CA: CPT | Performed by: PHYSICIAN ASSISTANT

## 2021-05-02 PROCEDURE — 93306 TTE W/DOPPLER COMPLETE: CPT

## 2021-05-02 PROCEDURE — 99207 PR APP CREDIT; MD BILLING SHARED VISIT: CPT | Performed by: PHYSICIAN ASSISTANT

## 2021-05-02 PROCEDURE — 85384 FIBRINOGEN ACTIVITY: CPT | Performed by: STUDENT IN AN ORGANIZED HEALTH CARE EDUCATION/TRAINING PROGRAM

## 2021-05-02 PROCEDURE — 999N000157 HC STATISTIC RCP TIME EA 10 MIN

## 2021-05-02 PROCEDURE — 258N000003 HC RX IP 258 OP 636: Performed by: NURSE PRACTITIONER

## 2021-05-02 PROCEDURE — 94640 AIRWAY INHALATION TREATMENT: CPT

## 2021-05-02 RX ORDER — ALBUTEROL SULFATE 0.83 MG/ML
2.5 SOLUTION RESPIRATORY (INHALATION) 4 TIMES DAILY
Status: DISCONTINUED | OUTPATIENT
Start: 2021-05-02 | End: 2021-05-08

## 2021-05-02 RX ORDER — FUROSEMIDE 10 MG/ML
20 INJECTION INTRAMUSCULAR; INTRAVENOUS ONCE
Status: COMPLETED | OUTPATIENT
Start: 2021-05-02 | End: 2021-05-02

## 2021-05-02 RX ADMIN — DEFERASIROX 1440 MG: 360 TABLET ORAL at 20:32

## 2021-05-02 RX ADMIN — ENOXAPARIN SODIUM 80 MG: 80 INJECTION SUBCUTANEOUS at 03:10

## 2021-05-02 RX ADMIN — FUROSEMIDE 20 MG: 10 INJECTION, SOLUTION INTRAMUSCULAR; INTRAVENOUS at 14:18

## 2021-05-02 RX ADMIN — ACETAMINOPHEN 650 MG: 325 TABLET, FILM COATED ORAL at 09:15

## 2021-05-02 RX ADMIN — ACETAMINOPHEN 650 MG: 325 TABLET, FILM COATED ORAL at 16:29

## 2021-05-02 RX ADMIN — ACETAMINOPHEN 650 MG: 325 TABLET, FILM COATED ORAL at 20:32

## 2021-05-02 RX ADMIN — FUROSEMIDE 20 MG: 10 INJECTION, SOLUTION INTRAMUSCULAR; INTRAVENOUS at 16:32

## 2021-05-02 RX ADMIN — CEFTRIAXONE 1 G: 1 INJECTION, POWDER, FOR SOLUTION INTRAMUSCULAR; INTRAVENOUS at 18:56

## 2021-05-02 RX ADMIN — CELECOXIB 100 MG: 100 CAPSULE ORAL at 12:14

## 2021-05-02 RX ADMIN — FLUTICASONE FUROATE AND VILANTEROL TRIFENATATE 1 PUFF: 200; 25 POWDER RESPIRATORY (INHALATION) at 20:33

## 2021-05-02 RX ADMIN — HYDROXYUREA 2000 MG: 500 CAPSULE ORAL at 09:15

## 2021-05-02 RX ADMIN — ALBUTEROL SULFATE 2.5 MG: 2.5 SOLUTION RESPIRATORY (INHALATION) at 21:35

## 2021-05-02 RX ADMIN — SERTRALINE HYDROCHLORIDE 200 MG: 100 TABLET ORAL at 09:15

## 2021-05-02 RX ADMIN — ENOXAPARIN SODIUM 80 MG: 80 INJECTION SUBCUTANEOUS at 16:29

## 2021-05-02 RX ADMIN — ARIPIPRAZOLE 2 MG: 2 TABLET ORAL at 09:15

## 2021-05-02 RX ADMIN — ACETAMINOPHEN 650 MG: 325 TABLET, FILM COATED ORAL at 12:15

## 2021-05-02 RX ADMIN — Medication: at 18:50

## 2021-05-02 RX ADMIN — ALBUTEROL SULFATE 2.5 MG: 2.5 SOLUTION RESPIRATORY (INHALATION) at 15:06

## 2021-05-02 RX ADMIN — AZITHROMYCIN MONOHYDRATE 500 MG: 500 INJECTION, POWDER, LYOPHILIZED, FOR SOLUTION INTRAVENOUS at 21:30

## 2021-05-02 ASSESSMENT — ACTIVITIES OF DAILY LIVING (ADL)
ADLS_ACUITY_SCORE: 15

## 2021-05-02 NOTE — PHARMACY-ANTICOAGULATION SERVICE
Pt is currently receiving enoxaparin 80 mg (1 mg/kg) subcutaneous every 12 hours, pt has history of RUE DVT and is in sickle cell crisis.  Anti Xa level was drawn this morning after her enoxaparin 80 mg dose.    Enoxaparin 80 mg subcutaneous given at 5/2/2021 0310  Anti Xa level drawn at 0730  0.73 international unit(s)/ml    Recommended range for twice daily dosing is a peak concentration of 0.6-1 international unit(s)/ml, drawn 4 hours after the dose.    Based on current recommendations for anti-Xa levels, this concentration is within the desired range.

## 2021-05-02 NOTE — PROGRESS NOTES
[facility]    Medicine Progress Note - Hospitalist Service, Gold 4       Date of Admission:  4/26/2021  Assessment & Plan       Jennifer Cervantes is a 20 yo F with PMHx of sickle cell anemia c/b CVA with residual RUE hemiparesis, and PE, iron overload 2/2 frequent transfusions, asthma, depression, and anxiety who presented with worsening pain, admitted on 4/26/2021 for acute sickle cell pain crisis, complicated by acute hypoxic respiratory failure likely due to acute chest syndrome vs community acquired pneumonia.     #Acute hypoxic respiratory failure  Patient initially requiring 2-3L NC, with significant increase in O2 requirement to 20L HFNC. Likely multifactorial. COVID negative on admission. Found to have worsening PE as noted below. CXR with RLL pneumonia, likely CAP, but also concern for acute chest s/p exchange transfusion with minimal improvement. TTE on 5/2 normal. Repeat CXR with increased diffuse bilateral pulmonary opacities with bilateral pleural effusions, possibly due to pulmonary edema, but minimal improvement with diuresis. ?Lung injury ?ARDS ?Atypical/fungal infection   - Diuresis with lasix 20 mg IV with strict I&O, daily weights   - Abx as noted below Ceftriaxone/Azithromycin  - Anticoagulation with lovenox as noted below  - Continue HFNC and wean as able   - CT chest without contrast - Diffuse GGO and consolidative opacities suspicious for infection  - Pulmonary consult  - Infectious disease consult    - Repeat Procalcitonin, RVP, sputum cx if able (dry cough), Beta D glucan, Galactomannan, histo urinary antigen, blasto urinary antigen, strep pneumo urinary antigen, legionella urinary antigen. Repeat COVID swab   - Acapella and pulmonary toilet     #Acute sickle cell crisis   #Acute chest syndrome s/p apheresis transfusion 4/30/2021   HgbSS c/b frequent pain crisis, hx of CVA with RUE hemiparesis, iron overload, and DVT/PE. Presented with pain over  right side on ribs and back due to sickle cell crisis. Treated with Morphine PCA.  Hospital course complicated increase O2 requirement and fevers, CXR with RLL opacity possibly due to community acquired pneumonia, but also concern for acute chest s/p exchange transfusion on 4/30. No fevers >72 hours.   -Continue Ceftriaxone for acute chest (Day 6). Completed 5 days of azithromycin   -Pain management:    -Morphine PCA. Continuous rate 1 mg/hr, PCA dose 2 mg Q20 mins, (1 hour limit 7 mg)    -Celebrex 100 mg BID, Tylenol 650 mg Q6H PRN   -Bowel regimen  -Benadryl PRN    -Continue hydroxyurea 2000 mg daily   -Stop IVF with possible pulm edema    #Worsening PE - Admitted on 2/1-2/3/2021 with a new PE and started on DOAC (initally on rivaroxaban and later switched to Eliquis after finding RUE DVT for possible rivaroxaban failure). Patient reports compliance with anticoagulation. On this admission, pt found to have bilateral subsegmental perfusion defects on VQ scan, worse compared to prior VQ scan on 2/1.   -Hold Eliquis, possibly failure due to poor absorption?   -Therapeutic dosing Lovenox 1 mg/kg (80 mg) Q12h dosing     #Hx of iron overload - Continue PTA Deferasirox 1440 mg daily     #Asthma - Continue PTA Symbicort (formulary equivalent Breo Ellipta) and albuterol     #Anxiety  #Depression  - Continue PTA Sertraline 200 mg daily and Abilify 2 mg daily        Diet: Regular Diet Adult    DVT Prophylaxis: Enoxaparin (Lovenox) SQ  Lopez Catheter: not present  Code Status: Full Code           Disposition Plan   Expected discharge: 4 - 7 days, recommended to prior living arrangement once adequate pain management/ tolerating PO medications, hemoglobin stable and O2 use less than 1 liters/minute.  Entered: Chhaya Vela PA-C 05/03/2021, 1:05 PM       The patient's care was discussed with the Attending Physician, Dr. Shelli Grimaldo MD, Bedside Nurse and Patient.    Chhaya Vela PA-C  Hospitalist Service, [address]  "[facility]  Contact information available via [facility] Paging/Directory  Please see sign in/sign out for up to date coverage information  ______________________________________________________________________    Interval History   Patient endorses 10/10 pain in mid back radiating to left side, constant. States the pain medications help, but the pain has \"a mind of its own today\". States the pain is not positional. Occasionally pleuritic. Continues to have SOB, especially with minimal movement, unchanged from yesterday.  Endorses a dry cough. Continues to feel feverish, last night, but no fevers recorded in last 3 days. Denies sore throat or runny nose. Denies any N/V, abdominal pain, or dysuria. Last BM yesterday.     Data reviewed today: I reviewed all medications, new labs and imaging results over the last 24 hours.     Physical Exam   Vital Signs: Temp: 98.1  F (36.7  C) Temp src: Oral BP: 122/68 Pulse: 92   Resp: (!) 31 SpO2: 93 % O2 Device: High Flow Nasal Cannula (HFNC) Oxygen Delivery: 20 LPM  Weight: 173 lbs 4.5 oz  GENERAL: Alert and awake. Lying in bed, appears uncomfortable. Pleasant and cooperative.   HEENT: AT/NC. Anicteric sclera.   CARDIOVASCULAR: RRR. S1, S2. No murmurs, rubs, or gallops.   RESPIRATORY: Effort normal on HFNC. R lung with rales throughout, while lying on right side. Left lung CTA. No wheezing.    GI: Abdomen soft, non-tender abdomen without rebound or guarding, hyperactive bowel sounds present  EXTREMITIES: No peripheral edema. No calf asymmetry, erythema, or tenderness.   NEUROLOGICAL: CN II-XII grossly intact. Moving all extremities symmetrically.   SKIN: Intact. Warm and dry.  No jaundice.     Data   Recent Labs   Lab 05/03/21  0502 05/02/21  0736 05/01/21  0539 04/30/21  0548 04/30/21  0548 04/29/21  0344 04/28/21  1850 04/26/21  1410 04/26/21  1410   WBC 15.8* 17.3* 14.4*  --  20.4* 19.1*  --    < > 12.4*   HGB 6.9* 6.8* 6.7*   < > 6.7* " 7.2*  --    < > 7.6*   MCV 87 88 87  --  89 89  --    < > 89    250 175  --  286 298  --    < > 335    140 137  --   --  134  --    < > 139   POTASSIUM 3.2* 3.6 3.7  --   --  3.8  --    < > 3.7   CHLORIDE 104 106 105  --   --  101  --    < > 108   CO2 28 28 28  --   --  26  --    < > 22   BUN 4* 4* 5*  --   --  7  --    < > 7   CR 0.55 0.51* 0.52  --   --  0.55  --    < > 0.58   ANIONGAP 8 6 4  --   --  6  --    < > 9   MICAH 8.3* 8.6 8.0*  --   --  8.7  --    < > 9.2   GLC 96 90 91  --   --  102*  --    < > 96   ALBUMIN 2.6*  --   --   --   --   --   --   --  3.9   PROTTOTAL 6.6*  --   --   --   --   --   --   --  8.5   BILITOTAL 1.5*  --   --   --  3.8*  --   --   --  4.6*   ALKPHOS 86  --   --   --   --   --   --   --  89   ALT 44  --   --   --   --   --   --   --  117*   AST 53*  --   --   --   --   --   --   --  131*   TROPI  --   --   --   --   --  <0.015 <0.015  --   --     < > = values in this interval not displayed.     Recent Results (from the past 24 hour(s))   Echo Complete    Narrative    [id number]  [id number]  [id number]  [id number]^ARSENIO^NICHO^S     [facility]  [facility]chocardi[facility]  [address] [place] [id number]     Name: HIMANSHU AL  MRN: [id number]  : 2000  Study Date: 2021 12:37 PM  Age: 21 yrs  Gender: Female  Patient Location: [facility]  Reason For Study: Pulmonary Embolism  Ordering Physician: NICHO CESAR  Referring Physician: LESA THOMPSON  Performed By: Artie Machado     BSA: 1.8 m2  Height: 64 in  Weight: 169 lb  HR: 94  BP: 119/72 mmHg  ______________________________________________________________________________  Procedure  Echocardiogram with two-dimensional, color and spectral Doppler performed.  ______________________________________________________________________________  Interpretation Summary  Borderline (EF 50-55%) reduced left ventricular function is present. LVEF 53%  based on biplane 2D  tracing.  Right ventricular function, chamber size, wall motion, and thickness are  normal.  Pulmonary artery systolic pressure is 45 mmHg (42 mmHg+RA pressure).  Previous study not available for comparison.  ______________________________________________________________________________  Left Ventricle  Left ventricular size is normal. Left ventricular wall thickness is normal.  LVEF 53% based on biplane 2D tracing. Borderline (EF 50-55%) reduced left  ventricular function is present. Left ventricular diastolic function is  indeterminate.     Right Ventricle  Right ventricular function, chamber size, wall motion, and thickness are  normal.     Atria  Both atria appear normal.     Mitral Valve  The mitral valve is normal.     Aortic Valve  Aortic valve is normal in structure and function. The aortic valve is  tricuspid.     Tricuspid Valve  The tricuspid valve is normal. Trace tricuspid insufficiency is present. The  right ventricular systolic pressure is approximated at 42.4 mmHg plus the  right atrial pressure. Pulmonary artery systolic pressure is 45 mmHg (42  mmHg+RA pressure).     Pulmonic Valve  The pulmonic valve is normal.     Vessels  The aorta root is normal. The thoracic aorta is normal. The pulmonary artery  cannot be assessed. The inferior vena cava was normal in size with preserved  respiratory variability. IVC diameter <2.1 cm collapsing >50% with sniff  suggests a normal RA pressure of 3 mmHg.     Pericardium  No pericardial effusion is present.     Compared to Previous Study  Previous study not available for comparison.  ______________________________________________________________________________  MMode/2D Measurements & Calculations  IVSd: 0.79 cm     LVIDd: 5.0 cm  LVIDs: 3.5 cm  LVPWd: 0.90 cm  FS: 31.0 %  LV mass(C)d: 145.9 grams  LV mass(C)dI: 80.1 grams/m2  asc Aorta Diam: 2.4 cm  LVOT diam: 1.9 cm  LVOT area: 2.8 cm2  RWT: 0.36     Doppler Measurements & Calculations  MV E max rigo: 132.0  cm/sec  MV A max ashkan: 81.5 cm/sec  MV E/A: 1.6  MV dec slope: 839.0 cm/sec2  Ao V2 max: 171.0 cm/sec  Ao max P.0 mmHg  WHITNEY(V,D): 1.7 cm2  LV V1 max P.3 mmHg  LV V1 max: 104.0 cm/sec  LV V1 VTI: 20.9 cm  SV(LVOT): 59.3 ml  SI(LVOT): 32.5 ml/m2  PA V2 max: 125.0 cm/sec  PA max P.3 mmHg  PA acc time: 0.07 sec  TR max ashkan: 325.5 cm/sec  TR max P.4 mmHg  AV Ashkan Ratio (DI): 0.61     Lateral E/e': 6.4     ______________________________________________________________________________  Report approved by: Chava Wagner 2021 01:36 PM         CT Chest w/o Contrast    Impression    IMPRESSION:   1. Diffuse groundglass and consolidative opacities throughout the  lungs, most pronounced in the dependent aspects of the lung fields.  Findings are suspicious for infection, including atypical and viral  infections such as COVID-19 pneumonia. Less likely to represent  cardiogenic pulmonary edema given the lack of pleural effusion.  2. Walled off fluid collection in the superior left chest wall, in the  region of the chest wall port. Findings may represent a postprocedural  seroma, less likely an abscess.  3. Enlarged thoracic and gastrohepatic lymph nodes. Findings may be  reactive in the setting of infection, or related to patient's  underlying sickle cell anemia.     Medications     morphine       - MEDICATION INSTRUCTIONS -       sodium chloride 10 mL/hr at 21 1500       acetaminophen  650 mg Oral Q4H     albuterol  2.5 mg Nebulization 4x Daily     ARIPiprazole  2 mg Oral Daily     cefTRIAXone  1 g Intravenous Q24H     celecoxib  100 mg Oral BID     deferasirox  1,440 mg Oral QPM     enoxaparin ANTICOAGULANT  1 mg/kg Subcutaneous Q12H     fluticasone-vilanterol  1 puff Inhalation QPM     hydroxyurea  2,000 mg Oral Daily     lidocaine  1 patch Transdermal Q24h    And     lidocaine   Transdermal Q8H     polyethylene glycol  17 g Oral Daily     senna-docusate  1 tablet Oral BID    Or      senna-docusate  2 tablet Oral BID     sertraline  200 mg Oral Daily     sodium chloride (PF)  3 mL Intracatheter Q8H

## 2021-05-02 NOTE — PLAN OF CARE
Neuro: A&Ox4. Can make needs known  Cardiac: SR-ST, HR 90-110s. VSS. Afebrile   Respiratory: Sating >92% on 15L mask  GI/: Adequate urine output. Reported BM 5/1  Diet/appetite: Tolerating regular diet. Poor appetite   Activity:  Assist of 1, up to bedside commode  Pain: At acceptable level on current regimen. Back pain-PRN voltaren and lido patch applied, heat pad. Scheduled tylenol given. Morphine PCA in use  Skin: No new deficits noted.   LDA's: R PIV x2, L groin CVC     Plan: Continue with POC. Notify primary team with changes

## 2021-05-02 NOTE — PROGRESS NOTES
[facility]    Medicine Progress Note - Hospitalist Service, Gold 4       Date of Admission:  4/26/2021  Assessment & Plan         Jennifer Cervantes is a 21 year old female with a past medical history of sickle cell anemia, CVA with residual RUE hemiparesis, iron overload 2/2 frequent transfusions, asthma, depression and anxiety who presents with worsening pain concerning for an acute sickle cell pain crisis.      1. Acute hypoxic respiratory failure secondary to #1 - # 6 - not improving after exchange transfusion  2. Acute chest syndrome s/p transfusion (4/30/21)  3. Acute Sickle Cell Crisis  4. Acute Pain secondary to #1  5. Sinus tachycardia secondary to #1  6. Worsening of bilateral PE with history of RUE DVT, on chronic anticoagulation  After getting a transfusion late 4/30, her oxygen saturation improved from 20L down to 15L but now 36-48hr later she is still on 15L not improving. I am concerned that she may have worsening pleural effusions or right sided heart failure from her PE. We will plan for the following today:    -Check complete TTE  -Check updated chest x-ray and plan for Lasix and possible thoracentesis if pleural effusion is present    **Addendum: Chest x-ray revealed significant pulmonary edema without consolidated effusion. TTE essentially normal with no RHF from PE. She was given Lasix 20mg IV at 2pm and had a good response. We will give a second lasix 20mg IV dose now and then reassess tomorrow**    -Continue Ceftriaxone / Azithromcyin  -Continue Lovenox 1mg/kg q12h with checking a heparin xa level 3hours after her apixaban dose this morning. VQ scan yesterday had displayed worsening bilateral PE while she was previously on Eliquis.  -Continue hydroxyurea 2000mg daily  -Continue morphine PCA per pain plan              Continuous rate: 1mg/hr              PCA dose: 2mg               PCA lockout: 20 minutes              1 hour limit: 7mg      7. History of  iron overload  -Continue home regimen of deferasirox 1440mg daily - it was verified with pharmacy to be used short term in the hospital but will need to get her home supply as soon as she is able to contact family/friends to bring it    8. Hx of CVA (2015) with residual RUE deficits   -Able to independently care for herself. No acute issues.      9. Depression/Anxiety  -No acute issues since admission. Continue home Zoloft 200mg daily and Abilify 2mg daily.     10. Asthma  -No acute issues since admission. Continue home Breo Ellipta and albuterol      Diet: Regular Diet Adult    DVT Prophylaxis: Eliquis  Lopez Catheter: not present  Code Status: Full Code         Disposition Plan   Expected discharge:   Entered: RONALDO Kaur 05/02/2021, 11:30 AM     RONALDO Kaur  Hospitalist Service, [address]  Contact information available via [facility] Paging/Directory  Please see sign in/sign out for up to date coverage information  ______________________________________________________________________    Interval History   Ms. Cervantes was resting in bed reporting that she is exhausted, still feeling short of breath. She denies any fevers, chest pains, diarrhea, vomiting. We discussed her different diagnosis, her medications and concerns for her breathing with plans for an echo along with a repeat chest x-ray today. She was in agreement and had no further questions.    Data reviewed today: I reviewed all medications, new labs and imaging results over the last 24 hours.     Physical Exam   Vital Signs: Temp: 98  F (36.7  C) Temp src: Oral BP: 119/72 Pulse: 93   Resp: (!) 31 SpO2: 99 % O2 Device: High Flow Nasal Cannula (HFNC) Oxygen Delivery: 20 LPM  Weight: 169 lbs 8.54 oz  General Appearance: 21 year old female resting in bed, reports pain 8/10 over whole body that is more focused in low back and left leg today, pleasant in conversation  Respiratory: mildly  tachypneic on 15L of oxygen, distant breath sounds throughout bilateral lung fields with crackles at bilateral bases  Cardiovascular: regular rate and rhythm, no appreciable murmurs, rubs or gallops  GI: faint bowel sounds present, soft, minimally tender throughout, no rebound or guarding  Skin: warm, dry, no open sores, lesions or ulcerations    Data   Recent Labs   Lab 05/02/21  0736 05/01/21  0539 04/30/21  1955 04/30/21  0548 04/30/21  0548 04/29/21  0344 04/28/21  1850 04/26/21  1410 04/26/21  1410   WBC 17.3* 14.4*  --   --  20.4* 19.1*  --    < > 12.4*   HGB 6.8* 6.7* 6.8*   < > 6.7* 7.2*  --    < > 7.6*   MCV 88 87  --   --  89 89  --    < > 89    175  --   --  286 298  --    < > 335    137  --   --   --  134  --    < > 139   POTASSIUM 3.6 3.7  --   --   --  3.8  --    < > 3.7   CHLORIDE 106 105  --   --   --  101  --    < > 108   CO2 28 28  --   --   --  26  --    < > 22   BUN 4* 5*  --   --   --  7  --    < > 7   CR 0.51* 0.52  --   --   --  0.55  --    < > 0.58   ANIONGAP 6 4  --   --   --  6  --    < > 9   MICAH 8.6 8.0*  --   --   --  8.7  --    < > 9.2   GLC 90 91  --   --   --  102*  --    < > 96   ALBUMIN  --   --   --   --   --   --   --   --  3.9   PROTTOTAL  --   --   --   --   --   --   --   --  8.5   BILITOTAL  --   --   --   --  3.8*  --   --   --  4.6*   ALKPHOS  --   --   --   --   --   --   --   --  89   ALT  --   --   --   --   --   --   --   --  117*   AST  --   --   --   --   --   --   --   --  131*   TROPI  --   --   --   --   --  <0.015 <0.015  --   --     < > = values in this interval not displayed.

## 2021-05-02 NOTE — PLAN OF CARE
Temp: 98.8  F (37.1  C) Temp src: Oral BP: 118/60 Pulse: 101   Resp: (!) 36 SpO2: 98 % O2 Device: High Flow Nasal Cannula (HFNC) Oxygen Delivery: 20 LPM     Neuro: A&Ox4. Withdrawn, though pleasant. R sided weakness d/t prior CVA.  Cardiac: ST rates 100s-110s. VSS.   Respiratory: Not tolerating Oxiplus this AM, switched to HFNC 50-70%, 100% with activity.  GI/: Adequate urine output-jesica color, lasix x2. LBM 5/1  Diet/appetite: Tolerating regular diet. Encouraging intake.  Activity:  Assist of 1 up to commode.  Pain: At acceptable level on current regimen. Morphine PCA 1mg continuous, 2mg bumps q20min lockout 7mg  Skin: No new deficits noted.  LDA's: PIV x2. L groin CVC-CDI    Plan: Continue with POC. Notify primary team with changes.

## 2021-05-03 ENCOUNTER — APPOINTMENT (OUTPATIENT)
Dept: CT IMAGING | Facility: CLINIC | Age: 21
DRG: 811 | End: 2021-05-03
Attending: PHYSICIAN ASSISTANT
Payer: COMMERCIAL

## 2021-05-03 LAB
1,3 BETA GLUCAN SER-MCNC: NORMAL NG/ML
ALBUMIN SERPL-MCNC: 2.6 G/DL (ref 3.4–5)
ALP SERPL-CCNC: 86 U/L (ref 40–150)
ALT SERPL W P-5'-P-CCNC: 44 U/L (ref 0–50)
ANION GAP SERPL CALCULATED.3IONS-SCNC: 8 MMOL/L (ref 3–14)
AST SERPL W P-5'-P-CCNC: 53 U/L (ref 0–45)
B-D GLUCAN INTERPRETATION (1,3): NORMAL
BILIRUB SERPL-MCNC: 1.5 MG/DL (ref 0.2–1.3)
BUN SERPL-MCNC: 4 MG/DL (ref 7–30)
C PNEUM DNA SPEC QL NAA+PROBE: NOT DETECTED
CALCIUM SERPL-MCNC: 8.3 MG/DL (ref 8.5–10.1)
CHLORIDE SERPL-SCNC: 104 MMOL/L (ref 94–109)
CO2 SERPL-SCNC: 28 MMOL/L (ref 20–32)
CREAT SERPL-MCNC: 0.55 MG/DL (ref 0.52–1.04)
ERYTHROCYTE [DISTWIDTH] IN BLOOD BY AUTOMATED COUNT: 22.4 % (ref 10–15)
FLUAV H1 2009 PAND RNA SPEC QL NAA+PROBE: NOT DETECTED
FLUAV H1 RNA SPEC QL NAA+PROBE: NOT DETECTED
FLUAV H3 RNA SPEC QL NAA+PROBE: NOT DETECTED
FLUAV RNA SPEC QL NAA+PROBE: NOT DETECTED
FLUBV RNA SPEC QL NAA+PROBE: NOT DETECTED
GALACTOMANNAN AG SERPL QL IA: NORMAL
GALACTOMANNAN AG SERPL-ACNC: NORMAL
GFR SERPL CREATININE-BSD FRML MDRD: >90 ML/MIN/{1.73_M2}
GLUCOSE SERPL-MCNC: 96 MG/DL (ref 70–99)
HADV DNA SPEC QL NAA+PROBE: NOT DETECTED
HCOV PNL SPEC NAA+PROBE: NOT DETECTED
HCT VFR BLD AUTO: 21.1 % (ref 35–47)
HGB BLD-MCNC: 6.9 G/DL (ref 11.7–15.7)
HMPV RNA SPEC QL NAA+PROBE: NOT DETECTED
HPIV1 RNA SPEC QL NAA+PROBE: NOT DETECTED
HPIV2 RNA SPEC QL NAA+PROBE: NOT DETECTED
HPIV3 RNA SPEC QL NAA+PROBE: NOT DETECTED
HPIV4 RNA SPEC QL NAA+PROBE: NOT DETECTED
L PNEUMO1 AG UR QL IA: NORMAL
LAB SCANNED RESULT: ABNORMAL
LAB SCANNED RESULT: ABNORMAL
LABORATORY COMMENT REPORT: NORMAL
LACTATE BLD-SCNC: 0.8 MMOL/L (ref 0.7–2)
M PNEUMO DNA SPEC QL NAA+PROBE: NOT DETECTED
MCH RBC QN AUTO: 28.4 PG (ref 26.5–33)
MCHC RBC AUTO-ENTMCNC: 32.7 G/DL (ref 31.5–36.5)
MCV RBC AUTO: 87 FL (ref 78–100)
MICROBIOLOGIST REVIEW: NORMAL
MRSA DNA SPEC QL NAA+PROBE: NEGATIVE
PLATELET # BLD AUTO: 297 10E9/L (ref 150–450)
POTASSIUM SERPL-SCNC: 3.2 MMOL/L (ref 3.4–5.3)
POTASSIUM SERPL-SCNC: 3.2 MMOL/L (ref 3.4–5.3)
PROCALCITONIN SERPL-MCNC: 0.44 NG/ML
PROT SERPL-MCNC: 6.6 G/DL (ref 6.8–8.8)
RBC # BLD AUTO: 2.43 10E12/L (ref 3.8–5.2)
RETICS # AUTO: 364.5 10E9/L (ref 25–95)
RETICS/RBC NFR AUTO: 15 % (ref 0.5–2)
RSV RNA SPEC QL NAA+PROBE: NOT DETECTED
RSV RNA SPEC QL NAA+PROBE: NOT DETECTED
RV+EV RNA SPEC QL NAA+PROBE: NOT DETECTED
S PNEUM AG SPEC QL: NORMAL
SARS-COV-2 RNA RESP QL NAA+PROBE: NEGATIVE
SODIUM SERPL-SCNC: 140 MMOL/L (ref 133–144)
SPECIMEN SOURCE: NORMAL
WBC # BLD AUTO: 15.8 10E9/L (ref 4–11)

## 2021-05-03 PROCEDURE — 84145 PROCALCITONIN (PCT): CPT | Performed by: PHYSICIAN ASSISTANT

## 2021-05-03 PROCEDURE — 250N000013 HC RX MED GY IP 250 OP 250 PS 637: Performed by: PEDIATRICS

## 2021-05-03 PROCEDURE — U0005 INFEC AGEN DETEC AMPLI PROBE: HCPCS | Performed by: PHYSICIAN ASSISTANT

## 2021-05-03 PROCEDURE — 36415 COLL VENOUS BLD VENIPUNCTURE: CPT | Performed by: PHYSICIAN ASSISTANT

## 2021-05-03 PROCEDURE — 99233 SBSQ HOSP IP/OBS HIGH 50: CPT | Mod: GC | Performed by: INTERNAL MEDICINE

## 2021-05-03 PROCEDURE — 87640 STAPH A DNA AMP PROBE: CPT | Performed by: PHYSICIAN ASSISTANT

## 2021-05-03 PROCEDURE — 87305 ASPERGILLUS AG IA: CPT | Performed by: STUDENT IN AN ORGANIZED HEALTH CARE EDUCATION/TRAINING PROGRAM

## 2021-05-03 PROCEDURE — 999N000157 HC STATISTIC RCP TIME EA 10 MIN

## 2021-05-03 PROCEDURE — 99223 1ST HOSP IP/OBS HIGH 75: CPT | Mod: GC | Performed by: INTERNAL MEDICINE

## 2021-05-03 PROCEDURE — 87899 AGENT NOS ASSAY W/OPTIC: CPT | Performed by: PHYSICIAN ASSISTANT

## 2021-05-03 PROCEDURE — 250N000009 HC RX 250: Performed by: PHYSICIAN ASSISTANT

## 2021-05-03 PROCEDURE — 87449 NOS EACH ORGANISM AG IA: CPT | Performed by: PHYSICIAN ASSISTANT

## 2021-05-03 PROCEDURE — 99223 1ST HOSP IP/OBS HIGH 75: CPT | Performed by: INTERNAL MEDICINE

## 2021-05-03 PROCEDURE — 87385 HISTOPLASMA CAPSUL AG IA: CPT | Performed by: PHYSICIAN ASSISTANT

## 2021-05-03 PROCEDURE — U0003 INFECTIOUS AGENT DETECTION BY NUCLEIC ACID (DNA OR RNA); SEVERE ACUTE RESPIRATORY SYNDROME CORONAVIRUS 2 (SARS-COV-2) (CORONAVIRUS DISEASE [COVID-19]), AMPLIFIED PROBE TECHNIQUE, MAKING USE OF HIGH THROUGHPUT TECHNOLOGIES AS DESCRIBED BY CMS-2020-01-R: HCPCS | Performed by: PHYSICIAN ASSISTANT

## 2021-05-03 PROCEDURE — 94640 AIRWAY INHALATION TREATMENT: CPT

## 2021-05-03 PROCEDURE — 250N000011 HC RX IP 250 OP 636: Performed by: PHYSICIAN ASSISTANT

## 2021-05-03 PROCEDURE — 87305 ASPERGILLUS AG IA: CPT | Performed by: PHYSICIAN ASSISTANT

## 2021-05-03 PROCEDURE — 99233 SBSQ HOSP IP/OBS HIGH 50: CPT | Performed by: STUDENT IN AN ORGANIZED HEALTH CARE EDUCATION/TRAINING PROGRAM

## 2021-05-03 PROCEDURE — 94640 AIRWAY INHALATION TREATMENT: CPT | Mod: 76

## 2021-05-03 PROCEDURE — 36415 COLL VENOUS BLD VENIPUNCTURE: CPT | Performed by: STUDENT IN AN ORGANIZED HEALTH CARE EDUCATION/TRAINING PROGRAM

## 2021-05-03 PROCEDURE — 87641 MR-STAPH DNA AMP PROBE: CPT | Performed by: PHYSICIAN ASSISTANT

## 2021-05-03 PROCEDURE — 250N000011 HC RX IP 250 OP 636: Performed by: STUDENT IN AN ORGANIZED HEALTH CARE EDUCATION/TRAINING PROGRAM

## 2021-05-03 PROCEDURE — 99207 PR APP CREDIT; MD BILLING SHARED VISIT: CPT | Performed by: PHYSICIAN ASSISTANT

## 2021-05-03 PROCEDURE — 80053 COMPREHEN METABOLIC PANEL: CPT | Performed by: PHYSICIAN ASSISTANT

## 2021-05-03 PROCEDURE — 87581 M.PNEUMON DNA AMP PROBE: CPT | Performed by: PHYSICIAN ASSISTANT

## 2021-05-03 PROCEDURE — 87633 RESP VIRUS 12-25 TARGETS: CPT | Performed by: PHYSICIAN ASSISTANT

## 2021-05-03 PROCEDURE — 71250 CT THORAX DX C-: CPT

## 2021-05-03 PROCEDURE — 71250 CT THORAX DX C-: CPT | Mod: 26 | Performed by: RADIOLOGY

## 2021-05-03 PROCEDURE — 250N000013 HC RX MED GY IP 250 OP 250 PS 637: Performed by: PHYSICIAN ASSISTANT

## 2021-05-03 PROCEDURE — 87449 NOS EACH ORGANISM AG IA: CPT | Performed by: STUDENT IN AN ORGANIZED HEALTH CARE EDUCATION/TRAINING PROGRAM

## 2021-05-03 PROCEDURE — 87486 CHLMYD PNEUM DNA AMP PROBE: CPT | Performed by: PHYSICIAN ASSISTANT

## 2021-05-03 PROCEDURE — 250N000013 HC RX MED GY IP 250 OP 250 PS 637: Performed by: STUDENT IN AN ORGANIZED HEALTH CARE EDUCATION/TRAINING PROGRAM

## 2021-05-03 PROCEDURE — 85045 AUTOMATED RETICULOCYTE COUNT: CPT | Performed by: PHYSICIAN ASSISTANT

## 2021-05-03 PROCEDURE — 83605 ASSAY OF LACTIC ACID: CPT | Performed by: STUDENT IN AN ORGANIZED HEALTH CARE EDUCATION/TRAINING PROGRAM

## 2021-05-03 PROCEDURE — 120N000003 HC R&B IMCU UMMC

## 2021-05-03 PROCEDURE — 84132 ASSAY OF SERUM POTASSIUM: CPT | Performed by: STUDENT IN AN ORGANIZED HEALTH CARE EDUCATION/TRAINING PROGRAM

## 2021-05-03 PROCEDURE — 85027 COMPLETE CBC AUTOMATED: CPT | Performed by: PHYSICIAN ASSISTANT

## 2021-05-03 PROCEDURE — 258N000003 HC RX IP 258 OP 636: Performed by: STUDENT IN AN ORGANIZED HEALTH CARE EDUCATION/TRAINING PROGRAM

## 2021-05-03 RX ORDER — HEPARIN SODIUM (PORCINE) LOCK FLUSH IV SOLN 100 UNIT/ML 100 UNIT/ML
5 SOLUTION INTRAVENOUS EVERY 24 HOURS
Status: DISCONTINUED | OUTPATIENT
Start: 2021-05-04 | End: 2021-05-07

## 2021-05-03 RX ORDER — HEPARIN SODIUM (PORCINE) LOCK FLUSH IV SOLN 100 UNIT/ML 100 UNIT/ML
5 SOLUTION INTRAVENOUS EVERY 24 HOURS
Status: DISCONTINUED | OUTPATIENT
Start: 2021-05-03 | End: 2021-05-07

## 2021-05-03 RX ORDER — POTASSIUM CHLORIDE 750 MG/1
40 TABLET, EXTENDED RELEASE ORAL ONCE
Status: COMPLETED | OUTPATIENT
Start: 2021-05-03 | End: 2021-05-03

## 2021-05-03 RX ORDER — FUROSEMIDE 10 MG/ML
20 INJECTION INTRAMUSCULAR; INTRAVENOUS ONCE
Status: COMPLETED | OUTPATIENT
Start: 2021-05-03 | End: 2021-05-03

## 2021-05-03 RX ADMIN — ALBUTEROL SULFATE 2.5 MG: 2.5 SOLUTION RESPIRATORY (INHALATION) at 16:56

## 2021-05-03 RX ADMIN — LIDOCAINE 1 PATCH: 560 PATCH PERCUTANEOUS; TOPICAL; TRANSDERMAL at 20:30

## 2021-05-03 RX ADMIN — POTASSIUM CHLORIDE 40 MEQ: 750 TABLET, EXTENDED RELEASE ORAL at 21:50

## 2021-05-03 RX ADMIN — VANCOMYCIN HYDROCHLORIDE 1500 MG: 10 INJECTION, POWDER, LYOPHILIZED, FOR SOLUTION INTRAVENOUS at 16:47

## 2021-05-03 RX ADMIN — ARIPIPRAZOLE 2 MG: 2 TABLET ORAL at 09:53

## 2021-05-03 RX ADMIN — DICLOFENAC SODIUM 4 G: 10 GEL TOPICAL at 00:09

## 2021-05-03 RX ADMIN — ACETAMINOPHEN 650 MG: 325 TABLET, FILM COATED ORAL at 09:52

## 2021-05-03 RX ADMIN — CELECOXIB 100 MG: 100 CAPSULE ORAL at 00:09

## 2021-05-03 RX ADMIN — DEFERASIROX 1440 MG: 360 TABLET ORAL at 20:30

## 2021-05-03 RX ADMIN — ACETAMINOPHEN 650 MG: 325 TABLET, FILM COATED ORAL at 13:17

## 2021-05-03 RX ADMIN — ENOXAPARIN SODIUM 80 MG: 80 INJECTION SUBCUTANEOUS at 15:46

## 2021-05-03 RX ADMIN — ALBUTEROL SULFATE 2.5 MG: 2.5 SOLUTION RESPIRATORY (INHALATION) at 07:57

## 2021-05-03 RX ADMIN — FUROSEMIDE 20 MG: 10 INJECTION, SOLUTION INTRAMUSCULAR; INTRAVENOUS at 11:31

## 2021-05-03 RX ADMIN — CEFEPIME HYDROCHLORIDE 2 G: 2 INJECTION, POWDER, FOR SOLUTION INTRAVENOUS at 15:46

## 2021-05-03 RX ADMIN — POTASSIUM CHLORIDE 40 MEQ: 750 TABLET, EXTENDED RELEASE ORAL at 09:51

## 2021-05-03 RX ADMIN — FLUTICASONE FUROATE AND VILANTEROL TRIFENATATE 1 PUFF: 200; 25 POWDER RESPIRATORY (INHALATION) at 20:30

## 2021-05-03 RX ADMIN — Medication 5 ML: at 17:11

## 2021-05-03 RX ADMIN — ACETAMINOPHEN 650 MG: 325 TABLET, FILM COATED ORAL at 18:04

## 2021-05-03 RX ADMIN — ACETAMINOPHEN 650 MG: 325 TABLET, FILM COATED ORAL at 21:50

## 2021-05-03 RX ADMIN — SERTRALINE HYDROCHLORIDE 200 MG: 100 TABLET ORAL at 09:52

## 2021-05-03 RX ADMIN — ENOXAPARIN SODIUM 80 MG: 80 INJECTION SUBCUTANEOUS at 04:03

## 2021-05-03 RX ADMIN — ACETAMINOPHEN 650 MG: 325 TABLET, FILM COATED ORAL at 04:03

## 2021-05-03 RX ADMIN — ALBUTEROL SULFATE 2.5 MG: 2.5 SOLUTION RESPIRATORY (INHALATION) at 11:17

## 2021-05-03 RX ADMIN — CELECOXIB 100 MG: 100 CAPSULE ORAL at 23:09

## 2021-05-03 RX ADMIN — HYDROXYUREA 2000 MG: 500 CAPSULE ORAL at 09:52

## 2021-05-03 RX ADMIN — Medication 5 ML: at 17:10

## 2021-05-03 RX ADMIN — Medication: at 15:22

## 2021-05-03 RX ADMIN — CEFEPIME HYDROCHLORIDE 2 G: 2 INJECTION, POWDER, FOR SOLUTION INTRAVENOUS at 23:09

## 2021-05-03 RX ADMIN — CELECOXIB 100 MG: 100 CAPSULE ORAL at 11:32

## 2021-05-03 RX ADMIN — ALBUTEROL SULFATE 2.5 MG: 2.5 SOLUTION RESPIRATORY (INHALATION) at 21:12

## 2021-05-03 RX ADMIN — DICLOFENAC SODIUM 4 G: 10 GEL TOPICAL at 20:30

## 2021-05-03 RX ADMIN — ACETAMINOPHEN 650 MG: 325 TABLET, FILM COATED ORAL at 00:09

## 2021-05-03 ASSESSMENT — ACTIVITIES OF DAILY LIVING (ADL)
ADLS_ACUITY_SCORE: 15

## 2021-05-03 NOTE — PROGRESS NOTES
CLINICAL NUTRITION SERVICES    Reviewed nutrition risk factors due to LOS. Pt is tolerating regular diet since admission with exception of few hours on clear liquid. Eating well per nursing documentation 100% intakes documented. Weight stable since admission.    No nutrition issues identified at this time. RD will follow via rounds at this time, unless consulted.    Ramona Hudson RD, LD  6B pager: [phone]

## 2021-05-03 NOTE — CONSULTS
[facility]   Pulmonary   Consult Note 5/3/2021  Jennifer Cervantes MRN: [id number]    We were consulted for evaluation of acute hypoxemic respiratory failure and new diffuse groundglass and consolidative opacities.    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    Differential at this point remains fairly broad and includes atypical bacterial infection, fungal infection, inflammatory process such as organizing pneumonia or diffuse alveolar hemorrhage (less likely given patient is having nonproductive cough without hemoptysis), or transfusion-related reaction given recent exchange transfusion and clinical worsening around this time. Typically would proceed with bronchoscopy for BAL at this point, however given the degree of hypoxia and other patient-related factors, bronchoscopy would be a higher risk procedure requiring intubation. It sounds like her asthma is fairly well controlled at home with Symbicort and PRN albuterol and her clinical presentation currently does not fit with an acute asthma exacerbation.    We recommend waiting for current infectious workup (in addition to additional infectious workup as recommended by Infectious Diseases) to finalize before consideration of empiric steroid treatment (likely 1 mg/kg starting dose versus pulse dose steroids).    Recommendations:     - await pending infectious workup   - agree with ID consult and their recommendations   - will consider empiric steroids if ID workup negative for infectious etiologies   - no bronchoscopy at this time    We will continue to follow.    Patient seen & discussed w/  Dr. Meza, who agrees with the above assessment and plan.    David Rudd M.D.  Pulmonary and Critical Care Medicine Fellow          History of Present Illness:   Jennifer Cervantes is a 21 year old woman with medical history notable for sickle cell anemia, recurrent bilateral PE on chronic anticoagulation, CVA with residual RUE hemiparesis, iron overload secondary to frequent transfusions, asthma, depression and anxiety who was admitted on 4/26/2021 with acute hypoxemic respiratory failure secondary to acute chest syndrome in setting of acute sickle cell crisis now status post exchange transfusion on 4/30/2021. Hospital course complicated by recurrent PE on V/Q scan requiring transition from DOAC to enoxaparin.    Initial CXR on 4/25/21 showed no acute airspace disease but follow up chest xray on 4/28/21 revealed increased right lower lung opacity. Subsequent chest xrays on 4/29, 4/30, and 5/2 show persistent to worsening opacities with stable bilateral pleural effusions. A CT chest without contrast today was notable for diffuse groundglass and consolidative opacities bilaterally with predominance in the dependent regions; there was also note of a walled off fluid collection in the superior left chest wall in the region of the chest port (seroma >> abscess) and enlarged thoracic and gastrohepatic lymph nodes.    Initially it was felt her presentation was secondary to acute chest syndrome and so she underwent exchange transfusion on 4/30/2021 in addition to pulmonary edema that was treated with IV diuretics. However, she has remained hypoxic without significant clinical improvement (remains on HFNC at 50% FiO2, 20 LPM). The patient has been covered  empirically for pneumonia with ceftriaxone and azithromycin x 5 days.    Notable workup thus far:  - CT per above  - V/Q scan 4/27/21 showing bilateral (L >R) perfusion defects, overall worse compared to 2/1/21 consistent with worsening pulmonary emboli  - WBC 20.4 -> 15.8  - procalcitonin 0.13 -> 0.44  - respiratory pathogen panel -> ordered  - strep pneumoniae urine antigen -> ordered  - legionella urine antigen -> ordered  - aspergillus galactomannan -> ordered  - 1,3 Beta D glucan (Fungitell) -> ordered  - SARS-CoV2 PCR negative 4/26/2021, repeat pending  - histoplasma urine antigen -> ordered  - blastomyces urine antigen -> ordered  - sputum sample -> needs to be collected   - gram stain   - bacterial culture  - blood cultures 4/29/2021 NGTD  - NT-Pro BNP 34 on 4/29/2021          Review of Symptoms:   10-point ROS reviewed, & found negative w/ exceptions noted in the HPI.          Past Medical History:     Past Medical History:   Diagnosis Date     Anemia      Anxiety      Bleeding disorder (H)      Blood clotting disorder (H)      Cerebral infarction (H) 2015     Cognitive developmental delay     low IQ. 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 (H)     right hand contractures     Iron overload due to repeated red blood cell transfusions      Migraines      Multiple subsegmental pulmonary emboli without acute cor pulmonale (H) 02/01/2021     Oppositional defiant behavior      Superficial venous thrombosis of arm, right 03/25/2021     Uncomplicated asthma        Past Surgical History:   Procedure Laterality Date     AS INSERT TUNNELED CV 2 CATH W/O PORT/PUMP       C BREAST REDUCTION (INCLUDES LIPO) TIER 3 Bilateral 04/23/2019     CHOLECYSTECTOMY       INSERT PORT VASCULAR ACCESS Left 4/21/2021    Procedure: INSERTION, VASCULAR ACCESS PORT (NOT SURE ON SIDE UNTIL REMOVAL);  Surgeon:  Rajan More MD;  Location: [facility] OR     IR CHEST PORT PLACEMENT > 5 YRS OF AGE  4/21/2021     IR CVC NON TUNNEL PLACEMENT  04/07/2020     IR PORT REMOVAL LEFT  4/21/2021     REMOVE PORT VASCULAR ACCESS Left 4/21/2021    Procedure: REMOVAL, VASCULAR ACCESS PORT LEFT;  Surgeon: Rajan More MD;  Location: [facility] OR     REPAIR TENDON ELBOW Right 10/02/2019    Procedure: Right Elbow Flexor Lengthening, Flexor Pronator Slide Of Wrist and Finger, Thumb Adductor Lengthening;  Surgeon: Anai Franco MD;  Location: [facility] OR     TONSILLECTOMY Bilateral 10/02/2019    Procedure: Bilateral Tonsillectomy;  Surgeon: Farhana Guy MD;  Location: [facility] OR            Allergies:     Allergies   Allergen Reactions     Contrast Dye      Hives and breathing issues     Fish-Derived Products Hives     Seafood Hives     Diagnostic X-Ray Materials      Gadolinium              Outpatient Medications:     No current facility-administered medications on file prior to encounter.   acetaminophen (TYLENOL) 325 MG tablet, Take 2 tablets (650 mg) by mouth every 6 hours as needed for mild pain  albuterol (PROAIR HFA/PROVENTIL HFA/VENTOLIN HFA) 108 (90 Base) MCG/ACT inhaler, Inhale 2 puffs into the lungs every 6 hours as needed for shortness of breath / dyspnea or wheezing  albuterol (PROVENTIL) (2.5 MG/3ML) 0.083% neb solution, Take 1 vial (2.5 mg) by nebulization every 6 hours as needed for shortness of breath / dyspnea or wheezing  apixaban ANTICOAGULANT (ELIQUIS) 5 MG tablet, Take 1 tablet (5 mg) by mouth 2 times daily  ARIPiprazole (ABILIFY) 2 MG tablet, Take 1 tablet (2 mg) by mouth daily  aspirin (ASPIRIN) 81 MG EC tablet, Take 1 tablet (81 mg) by mouth daily . HOLD while on Xarelto  budesonide-formoterol (SYMBICORT) 160-4.5 MCG/ACT Inhaler, Inhale 2 puffs into the lungs 2 times daily  celecoxib (CELEBREX) 100 MG capsule, Take 1 capsule (100 mg) by mouth 2 times daily  diclofenac (VOLTAREN) 1 % topical gel, Apply 4 g topically  4 times daily as needed for moderate pain Apply to back, legs, and arms for pain  diphenhydrAMINE (BENADRYL) 25 MG capsule, Take 1-2 capsules (25-50 mg) by mouth nightly as needed for sleep  EPINEPHrine (ANY BX GENERIC EQUIV) 0.3 MG/0.3ML injection 2-pack, Inject 0.3 mLs (0.3 mg) into the muscle as needed for anaphylaxis  Hydroxyurea 1000 MG TABS, Take 2,000 mg by mouth daily  JADENU 360 MG tablet, Take 4 tablets (1,440 mg) by mouth every evening  medroxyPROGESTERone (DEPO-PROVERA) 150 MG/ML IM injection, Inject 150 mg into the muscle  naloxone (NARCAN) 4 MG/0.1ML nasal spray, Spray 1 spray (4 mg) into one nostril alternating nostrils once as needed for opioid reversal every 2-3 minutes until assistance arrives  ondansetron (ZOFRAN) 8 MG tablet,   oxyCODONE (OXYCONTIN) 10 MG 12 hr tablet, Take 1 tablet (10 mg) by mouth every 12 hours  oxyCODONE IR (ROXICODONE) 15 MG tablet, Take 1 tablet (15 mg) by mouth every 6 hours as needed for severe pain  sertraline (ZOLOFT) 100 MG tablet, Take 2 tablets (200 mg) by mouth daily              Family History:     Family History   Problem Relation Age of Onset     Sickle Cell Trait Mother      Hypertension Mother      Asthma Mother      Sickle Cell Trait Father                Social History:     Social History     Tobacco Use     Smoking status: Never Smoker     Smokeless tobacco: Never Used   Substance Use Topics     Alcohol use: Not Currently     Alcohol/week: 0.0 standard drinks     Drug use: Never             Physical Exam:   /68 (BP Location: Left arm)   Pulse 92   Temp 98.1  F (36.7  C) (Oral)   Resp (!) 31   Wt 78.6 kg (173 lb 4.5 oz)   SpO2 93%   BMI 29.74 kg/m      General: anxious and tired appearing, interactive  HENT: HFNC in place, sclera anicteric  Lungs: scattered crackles bilaterally, no wheezing or rhonchi  Heart: regular rate and rhythm, no murmurs  Abdomen: soft, nontender, nondistended  Extremities: no lower extremity edema  Skin: no concerning  rashes or lesions  Neurologic: RUE hemiparesis with contracture, otherwise exam nonfocal, alert, interactive, answers questions appropriately          Data:   Labs (all laboratory studies reviewed by me): notable labs in HPI above.    Imaging and other diagnostic testing (all imaging studies reviewed by me)    Chest xray 4/25/2021:  Impression:   No acute airspace disease. Unchanged positioning of left-sided chest  Port.    Chest xray 4/28/2021:  Impression:   Increased right lower lung opacity suggesting infection, atelectasis, or infarction.    Chest xray 4/30/2021:  IMPRESSION: Ultrasound could help delineate how much of these  opacities are related to the effusions. Consolidation from pneumonia  in the lower lobes should be the prime consideration. Edema felt less  likely.    CT chest 5/3/2021:  IMPRESSION:   1. Diffuse groundglass and consolidative opacities throughout the  lungs, most pronounced in the dependent aspects of the lung fields.  Findings are suspicious for infection, including atypical and viral  infections such as COVID-19 pneumonia. Less likely to represent  cardiogenic pulmonary edema given the lack of pleural effusion.  2. Walled off fluid collection in the superior left chest wall, in the  region of the chest wall port. Findings may represent a postprocedural  seroma, less likely an abscess.  3. Enlarged thoracic and gastrohepatic lymph nodes. Findings may be  reactive in the setting of infection, or related to patient's  underlying sickle cell anemia.    V/Q scan 4/27/2021:  Impression: Bilateral subsegmental perfusion defects, left greater  than right, consistent with pulmonary emboli. Overall findings have  worsened since the comparison perfusion scan on 2/1/2021, particularly  within the left lung.    Transthoracic echocardiogram 5/2/2021:  Interpretation Summary  Borderline (EF 50-55%) reduced left ventricular function is present. LVEF 53% based on biplane 2D tracing. Right ventricular  function, chamber size, wall motion, and thickness are normal. Pulmonary artery systolic pressure is 45 mmHg (42 mmHg+RA pressure). Previous study not available for comparison.

## 2021-05-03 NOTE — PLAN OF CARE
Neuro: A&Ox4, withdrawn and flat, needs lots of motivation  Cardiac: SR-ST. /71 (BP Location: Left arm)   Pulse 99   Temp 98.6  F (37  C) (Oral)   Resp 25   Wt 78.6 kg (173 lb 4.5 oz)   SpO2 97%   BMI 29.74 kg/m      Respiratory: Sating  on HFNC 50% 15L facemask when going down to CT  GI/: Adequate urine output. No BM over shift, pt does not complain of constipation and refused bowel medications   Diet/appetite: Tolerating Reg diet. Did not eat anything over shift, offered multiple times and pt refused   Activity:  Assist of x1 up to cart and sitting at the edge of the bed   Pain: Morphine PCA for back pain from sickle cell  Skin: Bruising   LDA's: PIVx2 and femoral CVC    Plan: Continue with POC. Notify primary team with changes.

## 2021-05-03 NOTE — PLAN OF CARE
Neuro: A&Ox4. Withdrawn, though pleasant. R sided weakness d/t prior CVA.  Cardiac: SR-ST rates 80s-110s. VSS.    Respiratory: HFNC 50-70%, 20L flow.  GI/: Adequate urine output-jesica color. Pt refusing bowel meds  Diet/appetite: Tolerating regular diet. Encouraging intake.  Activity:  Assist of 1 up to commode.  Pain: At acceptable level on current regimen. Morphine PCA 1mg continuous, 2mg bumps q20min lockout 7mg. PRN cream.  Skin: No new deficits noted.  LDA's: PIV x2. L groin CVC-tender, CDI     Plan: Continue with POC. Notify primary team with changes

## 2021-05-03 NOTE — PROGRESS NOTES
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     Since admission she had worsening PE with low Apixaban level so was switched to Lovenox. She also had exchange transfusion for acute chest syndrome. Her Hgb remains the same but her respiratory failure is not improving. She was treated for pulmonary edema now her chest CT on 5/3/21 shows diffuse groundglass and consolidative opacities throughout the lungs, most pronounced in the dependent aspects of the lung fields. Findings are suspicious for infection, including atypical pneumonia. Less likely to represent cardiogenic pulmonary edema given the lack of pleural effusion. Also consider ongoing acute chest syndrome, although with exchange transfusion 3 days ago this is less likely.    I discussed with the primary team and recommended pulmonary and ID consult today.     # Worsening PE  - 4/27 V/Q scan shows bilateral subsegmental perfusion defects, left greater  than right, consistent with pulmonary emboli. Overall findings have  worsened since the comparison perfusion scan on 2/1/2021, particularly  within the left lung.   - She was admitted 2/1/21-2/3/21 with a new PE, started on Rivaroxaban, switched to Eliquis 3/25/21 with finding of RUE DVT (? rivaroxaban failure). She only held Eliquis  for 1 day for Left Port Removal and Left Port Placement on 4/21.   -She states that she was compliant with all Eliquis so it was continued since admission. Her increased left side chest pain feels different than her usual sickle pain, and is likely due to worsening PE. If her pain is from PNA then it should be on the right side.  We checked her Apixaban level 4/29 and it was 29, much lower than expected, presumably due to poor absorption.  Switched to Lovenox 4/29 PM.  - continue Lovenox   - will need to decide on oral option when she is better    # Acute respiratory failure with hypoxia due to acute chest syndrome, CAP, fluid overload and anemia. TTE on 5/2 is not suggesting pulmonary hypertension or CHF.   # Acute Chest Syndrome  # Community acquired PNA  # Sickle cell pain crisis  # Iron overload  - s/p RBC exchange on 4/30 d/t persistent fevers and increasing O2 requirements   - continue oxygen  - pulmonary consult    Recommendations:   - continue pain regimen per primary team  - continue Lovenox 1mg/kg; will need to determine outpatient anticoagulation closer to discharge   - pulmonary and ID consult  - prn lasix    Patient was seen and plan of care was discussed with attending physician  .    We will continue to follow this patient. Please don't hesitate to contact the Fellow On-Call with questions.    Juliette Miles MD, PhD  Hematology/Oncology   Pager: [phone]      HEMATOLOGY STAFF:  Seen with fellow, whose note reflects our joint evaluation, assessment, and plan.      Jose Manuel Hernandez MD  Associate Professor of Medicine  Division of Hematology, Oncology, and Transplantation  Director, [facility]      ___________________________________________________________________    Subjective & Interval History:    Patient remains on high flow oxygen and she gets irritated easily, doesn't want to answer questions    Physical Exam:    /68 (BP Location: Left arm)   Pulse  92   Temp 98.1  F (36.7  C) (Oral)   Resp (!) 31   Wt 78.6 kg (173 lb 4.5 oz)   SpO2 93%   BMI 29.74 kg/m    Gen: in pain, on HF oxygen  CV: tachycardia  Pulm: Tachypnic, mild crackles b/l  Neuro: Alert and answering questions appropriately.     Labs & Studies: I personally reviewed the following studies:  ROUTINE LABS (Last four results):  CMP  Recent Labs   Lab 05/03/21  0502 05/02/21  0736 05/01/21  0539 04/30/21  0548 04/29/21  0344    140 137  --  134   POTASSIUM 3.2* 3.6 3.7  --  3.8   CHLORIDE 104 106 105  --  101   CO2 28 28 28  --  26   ANIONGAP 8 6 4  --  6   GLC 96 90 91  --  102*   BUN 4* 4* 5*  --  7   CR 0.55 0.51* 0.52  --  0.55   GFRESTIMATED >90 >90 >90  --  >90   GFRESTBLACK >90 >90 >90  --  >90   MICAH 8.3* 8.6 8.0*  --  8.7   PROTTOTAL 6.6*  --   --   --   --    ALBUMIN 2.6*  --   --   --   --    BILITOTAL 1.5*  --   --  3.8*  --    ALKPHOS 86  --   --   --   --    AST 53*  --   --   --   --    ALT 44  --   --   --   --      CBC  Recent Labs   Lab 05/03/21 0502 05/02/21  0736 05/01/21  0539 04/30/21 1955 04/30/21  0548 04/30/21 0548   WBC 15.8* 17.3* 14.4*  --   --  20.4*   RBC 2.43* 2.38* 2.27*  --   --  2.09*   HGB 6.9* 6.8* 6.7* 6.8*   < > 6.7*   HCT 21.1* 20.9* 19.8* 20.7*   < > 18.6*   MCV 87 88 87  --   --  89   MCH 28.4 28.6 29.5  --   --  32.1   MCHC 32.7 32.5 33.8  --   --  36.0   RDW 22.4* 21.1* 19.1*  --   --  22.3*    250 175  --   --  286    < > = values in this interval not displayed.     INRNo lab results found in last 7 days.    Medications list for reference:  Current Facility-Administered Medications   Medication     acetaminophen (TYLENOL) tablet 650 mg     acetaminophen (TYLENOL) tablet 650 mg     albuterol (PROAIR HFA/PROVENTIL HFA/VENTOLIN HFA) 108 (90 Base) MCG/ACT inhaler 2 puff     albuterol (PROVENTIL) neb solution 2.5 mg     ARIPiprazole (ABILIFY) tablet 2 mg     ceFEPIme (MAXIPIME) 2 g vial to attach to  ml bag for ADULTS or 50 ml bag for PEDS      celecoxib (celeBREX) capsule 100 mg     deferasirox (JADENU) tablet 1,440 mg     diclofenac (VOLTAREN) 1 % topical gel 4 g     diphenhydrAMINE (BENADRYL) capsule 25 mg     enoxaparin ANTICOAGULANT (LOVENOX) injection 80 mg     fluticasone-vilanterol (BREO ELLIPTA) 200-25 MCG/INH inhaler 1 puff     hydroxyurea (HYDREA) capsule 2,000 mg     Lidocaine (LIDOCARE) 4 % Patch 1 patch    And     lidocaine patch in PLACE     lidocaine (LMX4) cream     lidocaine 1 % 0.1-1 mL     melatonin tablet 1 mg     morphine  PCA 1 mg/mL OPIOID TOLERANT     ondansetron (ZOFRAN) injection 4-8 mg     Patient is already receiving anticoagulation with heparin, enoxaparin (LOVENOX), warfarin (COUMADIN)  or other anticoagulant medication     polyethylene glycol (MIRALAX) Packet 17 g     senna-docusate (SENOKOT-S/PERICOLACE) 8.6-50 MG per tablet 1 tablet    Or     senna-docusate (SENOKOT-S/PERICOLACE) 8.6-50 MG per tablet 2 tablet     sertraline (ZOLOFT) tablet 200 mg     sodium chloride (PF) 0.9% PF flush 3 mL     sodium chloride (PF) 0.9% PF flush 3 mL     sodium chloride 0.9% infusion

## 2021-05-03 NOTE — PHARMACY-VANCOMYCIN DOSING SERVICE
Pharmacy Vancomycin Initial Note  Date of Service May 3, 2021  Patient's  2000  21 year old, female    Indication: Community Acquired Pneumonia    Current estimated CrCl = Estimated Creatinine Clearance: 162.9 mL/min (based on SCr of 0.55 mg/dL).    Creatinine for last 3 days  2021:  5:39 AM Creatinine 0.52 mg/dL  2021:  7:36 AM Creatinine 0.51 mg/dL  5/3/2021:  5:02 AM Creatinine 0.55 mg/dL    Recent Vancomycin Level(s) for last 3 days  No results found for requested labs within last 72 hours.      Vancomycin IV Administrations (past 72 hours)      No vancomycin orders with administrations in past 72 hours.                Nephrotoxins and other renal medications (From now, onward)    Start     Dose/Rate Route Frequency Ordered Stop    21 1600  vancomycin 1500 mg in 0.9% NaCl 250 ml intermittent infusion 1,500 mg      1,500 mg  over 90 Minutes Intravenous EVERY 12 HOURS 21 1552            Contrast Orders - past 72 hours (72h ago, onward)    None          Regimen: 1500 mg every 12 hours   Start time: 15:56 on 2021  Exposure target: AUC24 (range)400-600 mg/L.hr  AUC24,ss: 551 mg/L.hr  PAUC*: 81 %  Ctrough,ss: 15.7 mg/L  Pconc*: 33 %  Tox.: 11 %        Plan:  1. Start vancomycin  1500 mg IV q12h (19.1mg/kg using actual body wt = 78.6kg).   2. Vancomycin monitoring method: AUC  3. Vancomycin therapeutic monitoring goal: 400-600 mg*h/L  4. Pharmacy will check vancomycin levels as appropriate in 1-3 Days.    5. Serum creatinine levels will be ordered every 48 hours.      Venita Fitzpatrick, PharmD

## 2021-05-03 NOTE — CONSULTS
GENERAL ID SERVICE: NEW CONSULTATION  Patient:  Jennifer Cervantes, Date of birth 2000, Medical record number [id number]  Date of Admission: 4/26/2021  Date of Visit:  5/3/2021  Requesting Provider: Shelli Grimaldo         Assessment and Recommendations:   Problem List:    # Sicke cell crisis with progressive lung infiltrates and CT chest from 5/3/21 showing bilateral GGO    # Port cath in place since 4/21/21 - CT from 5/3/21 also showing walled off fluid collection in the superior left chest wall port however more suggestive of seroma (per CT report) and blood cultures are all negative    Discussion:    Ms. Jennifer Cervantes is a 21 year old female with PMHx of of sickle cell anemia, CVA with residual RUE hemiparesis, History of PE (was on xarelto in February 2021and then switched to eliquis on 3/25/21)  iron overload 2/2 frequent transfusions, asthma, depression and anxiety who presented to the [facility] ER with worsening pain concerning for an acute sickle cell pain crisis.  Patient has a several day history of worsening right sided pain concerning for sickle cell pain crises.  Unrelieved by home pain regimen. She endorsed pain primarily on her right side, mostly over her ribs and back. She was seen in the ED on  the day before her current presentation where she received some relief but was also seen again today by her hematology provider. She denies SOB as she was found to be acutely hypoxic in the clinic on the day of the admission. Upon arrival the patient febrile and hd white count of 12.9. Lactic acid was normal, procacitonin was 0.13 and CRP was 32. Blood cultures were obtained and are negative to date. COVID test was negative. CXR showed Increased right lower lung opacity suggesting infection, atelectasis or infarction. Patient was started on ceftriaxone and azithromycin for CAP coverage. In addition NM lung scan showed bilateral subsegmental perfusion defects, left greater than right, consistent with  "pulmonary emboli. Overall findings have worsened since the comparison perfusion scan on 2/1/2021, particularly within the left lung. Her eliquis was switched to lovenox. Hematology is following and the patient is also on deferasirox, hydroxyurea as well as pain regimen.     Patient continued to have white count elevation and fever from 4/28 to 4/30 and she underwent RBC exchange per hematology recommendation on 4/30. Patient is afebrile since 5/1. Her white count is still elevated at 15.8, but slightly down from yesterday (17.3). Renal function is normal. LFts are normal except for mildly elevated AST at 53 and T bili at 1.5 (likely from hemolysis). CT chest performed on 5/3/21 showed  \"Diffuse groundglass and consolidative opacities throughout the lungs, most pronounced in the dependent aspects of the lung fields.Findings are suspicious for infection, including atypical and viral infections such as COVID-19 pneumonia. Less likely to represent cardiogenic pulmonary edema given the lack of pleural effusion\". In addition the chest CT showed walled off fluid collection in the superior left chest wall port however more suggestive of seroma (per CT report). Blood cultures from 4/28, 4/29 are negative to date. New blood cultures were obtained today and are in process. Primary team consulted pulmonology and they ordered additional work up with Strep pneumo and Legionella urinary antigen, Blasto/Histo urnary antigen, BDG and serum Aspergillus Galactomannan 9all ordered today). COVID test was repeated today as well. In addition, a respiratory panel and sputum cultures were also collected. The patient still has significant chest pain in spite of opiate PCA and she has been requiring O2 supplementation through High flow nasal cannula with FiO2 ranfing between 50-70% since 4/30. Today she is requiring 50%. Pulmonology is deferring bronchoscopy for now given risk of progressive respiratory failure.     Although the progressive " lung findings could be multifactorial (ARDS, ACS, fluid overload..) the possibility of infectious process is still in the differential. I recommend to broaden the spectrum of antibiotics to cover more resistant and hospital acquired pathogens. I agree with pulm work up and would recommend to add some tests to the fungal and viral work up  (please see below). Patient has not been on steroids and she does not seem to be at liana risk for Pneumocystis, however given presence of GGO on CT I think it is still reasonable to obtain Pneumocystis PCR and DFA from sputum for completeness.       Recommendations:    1. Please stop ceftriaxone and azithromycin  2. Please start cefepime 2 grams IV q 8h and IV vancomycin (pharmacy to dose)  3. Please obtain the following tests: Histo/Blasto serum antigen, Coccidioides serum and urine antigen, Cryptococcus serum antigen, Fungal antibody panel, HSV and VZV PCR from blood, CMV PCR from blood, Pneumocystis PCR and DFA from sputum  4. If decision is made to proceed with bronchoscopy at any point, please obtain: cell count and differential, cytology, Gram stain, fungal stain, AFB stain, Bacterial (aerobic and anaerobic) cultures, fungal cultures and AFB cultures, Legionella PCR and culture, Nocardia stain and culture, Aspergillus Galactomannan from BAL, Pneumocystis PCR and DFA from BAL  5. Please obtain MRSA swab  6. I will follow the tests ordered today (Strep pneumo and Legionella urinary antigen, Blasto/Histo urnary antigen, BDG and serum Aspergillus Galactomannan, repeat CIVID and resp panel)   7. Will follow pending cultures     Recommendations discussed with primary team    Thank you for this consult. The General ID team will continue to follow this patient. Please feel free to call with any questions.     Pham Massey MD  Date of Service: 05/03/21  Pager: [phone]       History of Present Illness:   Ms. Jennifer Cervantes is a 21 year old female with PMHx of of sickle cell anemia,  CVA with residual RUE hemiparesis, History of PE (was on xarelto in February 2021and then switched to eliquis on 3/25/21)  iron overload 2/2 frequent transfusions, asthma, depression and anxiety who presented to the [facility] ER with worsening pain concerning for an acute sickle cell pain crisis.      Patient has a several day history of worsening right sided pain concerning for sickle cell pain crises.  Unrelieved by home pain regimen. She endorses pain primarily on her right side, mostly over her ribs and back. She was seen in the ED on  the day before her current presentation where she received some relief but was also seen again today by her hematology provider. She denies SOB as she was found to be acutely hypoxic in the clinic on the day of the admission.  She endorses compliance with her home inhalers.  She denies fever, chills, nausea, vomiting, abdominal pain. Upon arrival the patient febrile and hd white count of 12.9. Lactic acid was normal, procacitonin was 0.13 and CRP was 32. Blood cultures were obtained and are negative to date. COVID test was negative. CXR sghowed Increased right lower lung opacity suggesting infection, atelectasis or infarction. Patient was started on ceftriaxone and azithromycin for CAP coverage. In addition NM lung scan showed bilateral subsegmental perfusion defects, left greater than right, consistent with pulmonary emboli. Overall findings have worsened since the comparison perfusion scan on 2/1/2021, particularly within the left lung. Her eliquis was switched to lovenox. Hematology is following and the patient is also on deferasirox, hydroxyurea as well as pain regimen.     Patient continued to have white count elevation and fever from 4/28 to 4/30 and she underwent RBC exchange per hematology recommendation on 4/30. Patient is afebrile since 5/1. Her white count is still elevated at 15.8, but slightly down from yesterday (17.3). Renal function is normal. LFts are normal  except for mildly elevated AST at 53 and T bili at 1.5 (likely from hemolysis).     CT chest performed today has the following preliminary result:     1. Diffuse groundglass and consolidative opacities throughout the lungs, most pronounced in the dependent aspects of the lung fields.Findings are suspicious for infection, including atypical and viral infections such as COVID-19 pneumonia. Less likely to represent cardiogenic pulmonary edema given the lack of pleural effusion.  2. Walled off fluid collection in the superior left chest wall, in the region of the chest wall port. Findings may represent a postprocedural seroma, less likely an abscess.  3. Enlarged thoracic and gastrohepatic lymph nodes. Findings may be reactive in the setting of infection, or related to patient's underlying sickle cell anemia.     TTE showed EF 53% (no valvular lesions or abnormality)    Primary team consulted pulmonology and they ordered additional work up with Strep pneumo and Legionella urinary antigen, Blasto/Histo urnary antigen, BDG and serum Aspergillus Galactomannan 9all ordered today). COVID test was repeated today as well. In addition, a respiratory panel and sputum cultures were also collected. Blood cultures from 4/28, 4/29 are negative to date. New blood cultures were obtained today and are in process.          Review of Systems:     CONSTITUTIONAL:  See HPI  INTEGUMENTARY/SKIN: NEGATIVE for worrisome rashes, moles or lesions  EYES: Negative for icterus, vision changes or irritation  ENT/MOUTH:  Negative for oral lesions and sore throat  RESPIRATORY:  See HPI  CARDIOVASCULAR:  Negative for chest pain, palpitations and  shortness of breath  GASTROINTESTINAL:  Negative for abdominal pain, nausea, vomiting, diarrhea and constipation  GENITOURINARY:  Negative for dysuria, hematuria, frequency and urgency  MUSCULOSKELETAL: Negative for joint pain, swelling, motion restriction, negative for musculoskeletal pain  NEURO:  Negative  for headache, altered mental status, numbness or weakness  PSYCHIATRIC: Negative for changes in mood or affect  HEMATOLOGIC/LYMPHATIC: negative for lymphadenopathy or bleeding  ALLERGIC/IMMUNOLOGIC: Negative for allergic reaction   ENDOCRINE: Negative for temperature intolerance, skin/hair changes       Past Medical History:     Past Medical History:   Diagnosis Date     Anemia      Anxiety      Bleeding disorder (H)      Blood clotting disorder (H)      Cerebral infarction (H) 2015     Cognitive developmental delay     low IQ. 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 (H)     right hand contractures     Iron overload due to repeated red blood cell transfusions      Migraines      Multiple subsegmental pulmonary emboli without acute cor pulmonale (H) 02/01/2021     Oppositional defiant behavior      Superficial venous thrombosis of arm, right 03/25/2021     Uncomplicated asthma          Allergies:      Allergies   Allergen Reactions     Contrast Dye      Hives and breathing issues     Fish-Derived Products Hives     Seafood Hives     Diagnostic X-Ray Materials      Gadolinium             Family History:     Family History   Problem Relation Age of Onset     Sickle Cell Trait Mother      Hypertension Mother      Asthma Mother      Sickle Cell Trait Father             Social History:     Social History     Socioeconomic History     Marital status: Single     Spouse name: Not on file     Number of children: Not on file     Years of education: Not on file     Highest education level: Not on file   Occupational History     Not on file   Social Needs     Financial resource strain: Not on file     Food insecurity     Worry: Not on file     Inability: Not on file     Transportation needs     Medical: Not on file     Non-medical: Not on file   Tobacco Use     Smoking status: Never Smoker      "Smokeless tobacco: Never Used   Substance and Sexual Activity     Alcohol use: Not Currently     Alcohol/week: 0.0 standard drinks     Drug use: Never     Sexual activity: Not Currently     Partners: Male     Birth control/protection: Other   Lifestyle     Physical activity     Days per week: Not on file     Minutes per session: Not on file     Stress: Not on file   Relationships     Social connections     Talks on phone: Not on file     Gets together: Not on file     Attends Caodaism service: Not on file     Active member of club or organization: Not on file     Attends meetings of clubs or organizations: Not on file     Relationship status: Not on file     Intimate partner violence     Fear of current or ex partner: Not on file     Emotionally abused: Not on file     Physically abused: Not on file     Forced sexual activity: Not on file   Other Topics Concern     Parent/sibling w/ CABG, MI or angioplasty before 65F 55M? Not Asked   Social History Narrative    Lives with mom and extended family but \"toxic environment\" per her report. She would like to move out but cannot financially do so. She has minimal support at home despite her significant SCD comorbidities and cognitive delay from stroke.                Physical Exam:   /68 (BP Location: Left arm)   Pulse 92   Temp 98.1  F (36.7  C) (Oral)   Resp (!) 31   Wt 78.6 kg (173 lb 4.5 oz)   SpO2 93%   BMI 29.74 kg/m         Exam:  GENERAL:  In pain (chest). On High flow by nasal cannula  HEAD: Normocephalic and atraumatic  ENT:  No hearing impairment  EYES:  Eyes grossly normal to inspection, PERRL and conjunctivae and sclerae normal   NECK:  Supple  LUNGS:  Diffuse crackles in both lungs  CARDIOVASCULAR:  Regular rate and rhythm, normal S1 S2,   ABDOMEN:  Soft, nontender, no hepatosplenomegaly, no masses and bowel sounds normal  EXT: Extremities warm and without edema.  MS: No gross musculoskeletal defects noted, no edema  SKIN:  No acute rashes or " suspicious lesions  NEUROLOGIC:  Grossly nonfocal. Normal strength and tone, mentation intact and speech normal  PSYCHIATRIC: Mood stable, mentation appears normal, affect normal  HEMATOLOGIC/LYMPHATIC: No lymphadenopathy or bleeding           Laboratory Data:     Creatinine   Date Value Ref Range Status   05/03/2021 0.55 0.52 - 1.04 mg/dL Final   05/02/2021 0.51 (L) 0.52 - 1.04 mg/dL Final   05/01/2021 0.52 0.52 - 1.04 mg/dL Final   04/29/2021 0.55 0.52 - 1.04 mg/dL Final   04/28/2021 0.54 0.52 - 1.04 mg/dL Final     WBC   Date Value Ref Range Status   05/03/2021 15.8 (H) 4.0 - 11.0 10e9/L Final   05/02/2021 17.3 (H) 4.0 - 11.0 10e9/L Final   05/01/2021 14.4 (H) 4.0 - 11.0 10e9/L Final   04/30/2021 20.4 (H) 4.0 - 11.0 10e9/L Final   04/29/2021 19.1 (H) 4.0 - 11.0 10e9/L Final     Hemoglobin   Date Value Ref Range Status   05/03/2021 6.9 (LL) 11.7 - 15.7 g/dL Final     Comment:     This result has been called to JIM BUCK RN 6B by Lauren Obregon on 05 03 2021 at 0552, and has been read back.        Platelet Count   Date Value Ref Range Status   05/03/2021 297 150 - 450 10e9/L Final     Lab Results   Component Value Date     05/03/2021    BUN 4 (L) 05/03/2021    CO2 28 05/03/2021     CRP Inflammation   Date Value Ref Range Status   04/28/2021 32.0 (H) 0.0 - 8.0 mg/L Final   04/03/2021 8.8 (H) 0.0 - 8.0 mg/L Final   03/04/2021 6.8 0.0 - 8.0 mg/L Final   03/29/2020 21.0 (H) 0.0 - 8.0 mg/L Final           Pertinent Recent Microbiology Data:     Recent Labs   Lab 04/29/21 2205 04/29/21  2049 04/28/21  1858 04/28/21  1847   CULT No growth after 4 days No growth after 4 days No growth after 5 days No growth after 5 days   SDES Blood Right Hand Blood Left Hand Blood Right Hand Blood Left Arm            Imaging:     Recent Results (from the past 48 hour(s))   XR Chest Port 1 View    Narrative    EXAM: XR CHEST PORT 1 VIEW  5/2/2021 12:23 PM     HISTORY:  Patient with sickle cell disease, acute chest syndrome  and  PE, Hypoxia, shortness of breath.       COMPARISON:  Chest x-ray 2021    FINDINGS:   Portable semiupright AP view of the chest. Left chest wall Port-A-Cath  with tip projecting in the low SVC.    Trachea is midline. Cardiomediastinal silhouette is unchanged. Stable  to slightly increased diffuse bilateral hazy/patchy pulmonary  opacities. Layering bilateral pleural effusions. No pneumothorax is  appreciated.      Impression    IMPRESSION:   Increased diffuse bilateral pulmonary opacities with stable bilateral  pleural effusions, favor worsening pulmonary edema.    I have personally reviewed the examination and initial interpretation  and I agree with the findings.    FREDY DACOSTA MD   Echo Complete    Narrative    [id number]  [id number]  [id number]  [id number]^ARSENIO^NICHO^S     [facility],[place]  Echocardiography Laboratory  [address] [place] [id number]     Name: HIMANSHU AL  MRN: [id number]  : 2000  Study Date: 2021 12:37 PM  Age: 21 yrs  Gender: Female  Patient Location: [facility]  Reason For Study: Pulmonary Embolism  Ordering Physician: NICHO CESAR  Referring Physician: LESA THOMPSON  Performed By: Artie Machado     BSA: 1.8 m2  Height: 64 in  Weight: 169 lb  HR: 94  BP: 119/72 mmHg  ______________________________________________________________________________  Procedure  Echocardiogram with two-dimensional, color and spectral Doppler performed.  ______________________________________________________________________________  Interpretation Summary  Borderline (EF 50-55%) reduced left ventricular function is present. LVEF 53%  based on biplane 2D tracing.  Right ventricular function, chamber size, wall motion, and thickness are  normal.  Pulmonary artery systolic pressure is 45 mmHg (42 mmHg+RA pressure).  Previous study not available for comparison.  ______________________________________________________________________________  Left  Ventricle  Left ventricular size is normal. Left ventricular wall thickness is normal.  LVEF 53% based on biplane 2D tracing. Borderline (EF 50-55%) reduced left  ventricular function is present. Left ventricular diastolic function is  indeterminate.     Right Ventricle  Right ventricular function, chamber size, wall motion, and thickness are  normal.     Atria  Both atria appear normal.     Mitral Valve  The mitral valve is normal.     Aortic Valve  Aortic valve is normal in structure and function. The aortic valve is  tricuspid.     Tricuspid Valve  The tricuspid valve is normal. Trace tricuspid insufficiency is present. The  right ventricular systolic pressure is approximated at 42.4 mmHg plus the  right atrial pressure. Pulmonary artery systolic pressure is 45 mmHg (42  mmHg+RA pressure).     Pulmonic Valve  The pulmonic valve is normal.     Vessels  The aorta root is normal. The thoracic aorta is normal. The pulmonary artery  cannot be assessed. The inferior vena cava was normal in size with preserved  respiratory variability. IVC diameter <2.1 cm collapsing >50% with sniff  suggests a normal RA pressure of 3 mmHg.     Pericardium  No pericardial effusion is present.     Compared to Previous Study  Previous study not available for comparison.  ______________________________________________________________________________  MMode/2D Measurements & Calculations  IVSd: 0.79 cm     LVIDd: 5.0 cm  LVIDs: 3.5 cm  LVPWd: 0.90 cm  FS: 31.0 %  LV mass(C)d: 145.9 grams  LV mass(C)dI: 80.1 grams/m2  asc Aorta Diam: 2.4 cm  LVOT diam: 1.9 cm  LVOT area: 2.8 cm2  RWT: 0.36     Doppler Measurements & Calculations  MV E max rigo: 132.0 cm/sec  MV A max rigo: 81.5 cm/sec  MV E/A: 1.6  MV dec slope: 839.0 cm/sec2  Ao V2 max: 171.0 cm/sec  Ao max P.0 mmHg  WHITNEY(V,D): 1.7 cm2  LV V1 max P.3 mmHg  LV V1 max: 104.0 cm/sec  LV V1 VTI: 20.9 cm  SV(LVOT): 59.3 ml  SI(LVOT): 32.5 ml/m2  PA V2 max: 125.0 cm/sec  PA max P.3  mmHg  PA acc time: 0.07 sec  TR max ashkan: 325.5 cm/sec  TR max P.4 mmHg  AV Ashkan Ratio (DI): 0.61     Lateral E/e': 6.4     ______________________________________________________________________________  Report approved by: Chava Wagner 2021 01:36 PM         CT Chest w/o Contrast    Impression    IMPRESSION:   1. Diffuse groundglass and consolidative opacities throughout the  lungs, most pronounced in the dependent aspects of the lung fields.  Findings are suspicious for infection, including atypical and viral  infections such as COVID-19 pneumonia. Less likely to represent  cardiogenic pulmonary edema given the lack of pleural effusion.  2. Walled off fluid collection in the superior left chest wall, in the  region of the chest wall port. Findings may represent a postprocedural  seroma, less likely an abscess.  3. Enlarged thoracic and gastrohepatic lymph nodes. Findings may be  reactive in the setting of infection, or related to patient's  underlying sickle cell anemia.

## 2021-05-04 ENCOUNTER — APPOINTMENT (OUTPATIENT)
Dept: LAB | Facility: CLINIC | Age: 21
End: 2021-05-04
Payer: COMMERCIAL

## 2021-05-04 LAB
ABO + RH BLD: NORMAL
ABO + RH BLD: NORMAL
ALBUMIN SERPL-MCNC: 2.6 G/DL (ref 3.4–5)
ALP SERPL-CCNC: 105 U/L (ref 40–150)
ALT SERPL W P-5'-P-CCNC: 44 U/L (ref 0–50)
ANION GAP SERPL CALCULATED.3IONS-SCNC: 4 MMOL/L (ref 3–14)
AST SERPL W P-5'-P-CCNC: 57 U/L (ref 0–45)
BACTERIA SPEC CULT: NO GROWTH
BACTERIA SPEC CULT: NO GROWTH
BASOPHILS # BLD AUTO: 0.2 10E9/L (ref 0–0.2)
BASOPHILS NFR BLD AUTO: 1.4 %
BILIRUB DIRECT SERPL-MCNC: 1 MG/DL (ref 0–0.2)
BILIRUB SERPL-MCNC: 1.6 MG/DL (ref 0.2–1.3)
BLD GP AB SCN SERPL QL: NORMAL
BLD PROD TYP BPU: NORMAL
BLD UNIT ID BPU: 0
BLOOD BANK CMNT PATIENT-IMP: NORMAL
BLOOD PRODUCT CODE: NORMAL
BPU ID: NORMAL
BUN SERPL-MCNC: 5 MG/DL (ref 7–30)
CALCIUM SERPL-MCNC: 8.5 MG/DL (ref 8.5–10.1)
CHLORIDE SERPL-SCNC: 106 MMOL/L (ref 94–109)
CO2 SERPL-SCNC: 29 MMOL/L (ref 20–32)
CREAT SERPL-MCNC: 0.61 MG/DL (ref 0.52–1.04)
CRYPTOC AG SPEC QL: NORMAL
DIFFERENTIAL METHOD BLD: ABNORMAL
EOSINOPHIL # BLD AUTO: 1.9 10E9/L (ref 0–0.7)
EOSINOPHIL NFR BLD AUTO: 14.2 %
ERYTHROCYTE [DISTWIDTH] IN BLOOD BY AUTOMATED COUNT: 22.9 % (ref 10–15)
GFR SERPL CREATININE-BSD FRML MDRD: >90 ML/MIN/{1.73_M2}
GLUCOSE SERPL-MCNC: 80 MG/DL (ref 70–99)
HCT VFR BLD AUTO: 20.7 % (ref 35–47)
HCT VFR BLD AUTO: 21.9 % (ref 35–47)
HCT VFR BLD AUTO: 24.2 % (ref 35–47)
HGB BLD-MCNC: 6.6 G/DL (ref 11.7–15.7)
HGB BLD-MCNC: 7 G/DL (ref 11.7–15.7)
HGB BLD-MCNC: 8.2 G/DL (ref 11.7–15.7)
IMM GRANULOCYTES # BLD: 0.1 10E9/L (ref 0–0.4)
IMM GRANULOCYTES NFR BLD: 0.4 %
LACTATE BLD-SCNC: 0.6 MMOL/L (ref 0.7–2)
LYMPHOCYTES # BLD AUTO: 2.2 10E9/L (ref 0.8–5.3)
LYMPHOCYTES NFR BLD AUTO: 15.9 %
Lab: NORMAL
MCH RBC QN AUTO: 27.3 PG (ref 26.5–33)
MCHC RBC AUTO-ENTMCNC: 31.9 G/DL (ref 31.5–36.5)
MCV RBC AUTO: 86 FL (ref 78–100)
MONOCYTES # BLD AUTO: 1.2 10E9/L (ref 0–1.3)
MONOCYTES NFR BLD AUTO: 8.6 %
NEUTROPHILS # BLD AUTO: 8.1 10E9/L (ref 1.6–8.3)
NEUTROPHILS NFR BLD AUTO: 59.5 %
NRBC # BLD AUTO: 0.3 10*3/UL
NRBC BLD AUTO-RTO: 2 /100
NT-PROBNP SERPL-MCNC: 308 PG/ML (ref 0–450)
NUM BPU REQUESTED: 6
PLATELET # BLD AUTO: 358 10E9/L (ref 150–450)
POTASSIUM SERPL-SCNC: 4.1 MMOL/L (ref 3.4–5.3)
PROT SERPL-MCNC: 6.3 G/DL (ref 6.8–8.8)
RBC # BLD AUTO: 2.42 10E12/L (ref 3.8–5.2)
RETICS # AUTO: 339.8 10E9/L (ref 25–95)
RETICS/RBC NFR AUTO: 14 % (ref 0.5–2)
SODIUM SERPL-SCNC: 140 MMOL/L (ref 133–144)
SPECIMEN EXP DATE BLD: NORMAL
SPECIMEN SOURCE: NORMAL
TRANSFUSION RXN BLOOD BANK NOTIFICATION: NORMAL
TRANSFUSION STATUS PATIENT QL: NORMAL
WBC # BLD AUTO: 13.6 10E9/L (ref 4–11)

## 2021-05-04 PROCEDURE — 250N000009 HC RX 250: Performed by: PHYSICIAN ASSISTANT

## 2021-05-04 PROCEDURE — 80076 HEPATIC FUNCTION PANEL: CPT | Performed by: PHYSICIAN ASSISTANT

## 2021-05-04 PROCEDURE — 250N000009 HC RX 250

## 2021-05-04 PROCEDURE — 999N001060 HC STATISTIC BB TRANSF RXN INVEST: Performed by: PATHOLOGY

## 2021-05-04 PROCEDURE — 99233 SBSQ HOSP IP/OBS HIGH 50: CPT | Mod: GC | Performed by: INTERNAL MEDICINE

## 2021-05-04 PROCEDURE — 36415 COLL VENOUS BLD VENIPUNCTURE: CPT | Performed by: PHYSICIAN ASSISTANT

## 2021-05-04 PROCEDURE — 85014 HEMATOCRIT: CPT

## 2021-05-04 PROCEDURE — 999N000215 HC STATISTIC HFNC ADULT NON-CPAP

## 2021-05-04 PROCEDURE — 85025 COMPLETE CBC W/AUTO DIFF WBC: CPT | Performed by: PHYSICIAN ASSISTANT

## 2021-05-04 PROCEDURE — 36415 COLL VENOUS BLD VENIPUNCTURE: CPT | Performed by: INTERNAL MEDICINE

## 2021-05-04 PROCEDURE — 94640 AIRWAY INHALATION TREATMENT: CPT | Mod: 76

## 2021-05-04 PROCEDURE — 83880 ASSAY OF NATRIURETIC PEPTIDE: CPT | Performed by: INTERNAL MEDICINE

## 2021-05-04 PROCEDURE — 250N000011 HC RX IP 250 OP 636

## 2021-05-04 PROCEDURE — 250N000011 HC RX IP 250 OP 636: Performed by: STUDENT IN AN ORGANIZED HEALTH CARE EDUCATION/TRAINING PROGRAM

## 2021-05-04 PROCEDURE — 94640 AIRWAY INHALATION TREATMENT: CPT

## 2021-05-04 PROCEDURE — 86612 BLASTOMYCES ANTIBODY: CPT | Performed by: PHYSICIAN ASSISTANT

## 2021-05-04 PROCEDURE — 86635 COCCIDIOIDES ANTIBODY: CPT | Performed by: PHYSICIAN ASSISTANT

## 2021-05-04 PROCEDURE — 86606 ASPERGILLUS ANTIBODY: CPT | Performed by: PHYSICIAN ASSISTANT

## 2021-05-04 PROCEDURE — 250N000011 HC RX IP 250 OP 636: Performed by: PHYSICIAN ASSISTANT

## 2021-05-04 PROCEDURE — 999N000157 HC STATISTIC RCP TIME EA 10 MIN

## 2021-05-04 PROCEDURE — P9016 RBC LEUKOCYTES REDUCED: HCPCS

## 2021-05-04 PROCEDURE — 86698 HISTOPLASMA ANTIBODY: CPT | Performed by: PHYSICIAN ASSISTANT

## 2021-05-04 PROCEDURE — 87899 AGENT NOS ASSAY W/OPTIC: CPT | Performed by: INTERNAL MEDICINE

## 2021-05-04 PROCEDURE — 86902 BLOOD TYPE ANTIGEN DONOR EA: CPT

## 2021-05-04 PROCEDURE — 87449 NOS EACH ORGANISM AG IA: CPT | Performed by: PHYSICIAN ASSISTANT

## 2021-05-04 PROCEDURE — 86923 COMPATIBILITY TEST ELECTRIC: CPT

## 2021-05-04 PROCEDURE — 86850 RBC ANTIBODY SCREEN: CPT

## 2021-05-04 PROCEDURE — 120N000003 HC R&B IMCU UMMC

## 2021-05-04 PROCEDURE — 83021 HEMOGLOBIN CHROMOTOGRAPHY: CPT

## 2021-05-04 PROCEDURE — 87385 HISTOPLASMA CAPSUL AG IA: CPT | Performed by: PHYSICIAN ASSISTANT

## 2021-05-04 PROCEDURE — 99207 PR APP CREDIT; MD BILLING SHARED VISIT: CPT | Performed by: PHYSICIAN ASSISTANT

## 2021-05-04 PROCEDURE — 85018 HEMOGLOBIN: CPT

## 2021-05-04 PROCEDURE — 86901 BLOOD TYPING SEROLOGIC RH(D): CPT

## 2021-05-04 PROCEDURE — 83605 ASSAY OF LACTIC ACID: CPT | Performed by: INTERNAL MEDICINE

## 2021-05-04 PROCEDURE — 250N000013 HC RX MED GY IP 250 OP 250 PS 637: Performed by: PEDIATRICS

## 2021-05-04 PROCEDURE — 258N000003 HC RX IP 258 OP 636: Performed by: STUDENT IN AN ORGANIZED HEALTH CARE EDUCATION/TRAINING PROGRAM

## 2021-05-04 PROCEDURE — 87496 CYTOMEG DNA AMP PROBE: CPT | Performed by: PHYSICIAN ASSISTANT

## 2021-05-04 PROCEDURE — 36512 APHERESIS RBC: CPT

## 2021-05-04 PROCEDURE — 99233 SBSQ HOSP IP/OBS HIGH 50: CPT | Performed by: INTERNAL MEDICINE

## 2021-05-04 PROCEDURE — 80048 BASIC METABOLIC PNL TOTAL CA: CPT | Performed by: PHYSICIAN ASSISTANT

## 2021-05-04 PROCEDURE — 250N000013 HC RX MED GY IP 250 OP 250 PS 637: Performed by: PHYSICIAN ASSISTANT

## 2021-05-04 PROCEDURE — 86900 BLOOD TYPING SEROLOGIC ABO: CPT

## 2021-05-04 PROCEDURE — 87798 DETECT AGENT NOS DNA AMP: CPT | Performed by: PHYSICIAN ASSISTANT

## 2021-05-04 PROCEDURE — 85045 AUTOMATED RETICULOCYTE COUNT: CPT | Performed by: PHYSICIAN ASSISTANT

## 2021-05-04 PROCEDURE — 94664 DEMO&/EVAL PT USE INHALER: CPT

## 2021-05-04 RX ORDER — HEPARIN SODIUM (PORCINE) LOCK FLUSH IV SOLN 100 UNIT/ML 100 UNIT/ML
3 SOLUTION INTRAVENOUS ONCE
Status: COMPLETED | OUTPATIENT
Start: 2021-05-04 | End: 2021-05-04

## 2021-05-04 RX ORDER — DIPHENHYDRAMINE HYDROCHLORIDE 50 MG/ML
50 INJECTION INTRAMUSCULAR; INTRAVENOUS
Status: COMPLETED | OUTPATIENT
Start: 2021-05-04 | End: 2021-05-04

## 2021-05-04 RX ADMIN — DOCUSATE SODIUM 50 MG AND SENNOSIDES 8.6 MG 1 TABLET: 8.6; 5 TABLET, FILM COATED ORAL at 10:07

## 2021-05-04 RX ADMIN — ALBUTEROL SULFATE 2.5 MG: 2.5 SOLUTION RESPIRATORY (INHALATION) at 15:36

## 2021-05-04 RX ADMIN — CEFEPIME HYDROCHLORIDE 2 G: 2 INJECTION, POWDER, FOR SOLUTION INTRAVENOUS at 08:25

## 2021-05-04 RX ADMIN — VANCOMYCIN HYDROCHLORIDE 1500 MG: 10 INJECTION, POWDER, LYOPHILIZED, FOR SOLUTION INTRAVENOUS at 15:55

## 2021-05-04 RX ADMIN — DIPHENHYDRAMINE HYDROCHLORIDE 25 MG: 50 INJECTION, SOLUTION INTRAMUSCULAR; INTRAVENOUS at 17:50

## 2021-05-04 RX ADMIN — FLUTICASONE FUROATE AND VILANTEROL TRIFENATATE 1 PUFF: 200; 25 POWDER RESPIRATORY (INHALATION) at 19:48

## 2021-05-04 RX ADMIN — ACETAMINOPHEN 650 MG: 325 TABLET, FILM COATED ORAL at 06:00

## 2021-05-04 RX ADMIN — Medication 3 ML: at 19:13

## 2021-05-04 RX ADMIN — HYDROXYUREA 2000 MG: 500 CAPSULE ORAL at 10:10

## 2021-05-04 RX ADMIN — CEFEPIME HYDROCHLORIDE 2 G: 2 INJECTION, POWDER, FOR SOLUTION INTRAVENOUS at 14:56

## 2021-05-04 RX ADMIN — ALBUTEROL SULFATE 2.5 MG: 2.5 SOLUTION RESPIRATORY (INHALATION) at 08:11

## 2021-05-04 RX ADMIN — ACETAMINOPHEN 650 MG: 325 TABLET, FILM COATED ORAL at 10:07

## 2021-05-04 RX ADMIN — ENOXAPARIN SODIUM 80 MG: 80 INJECTION SUBCUTANEOUS at 15:56

## 2021-05-04 RX ADMIN — ALBUTEROL SULFATE 2.5 MG: 2.5 SOLUTION RESPIRATORY (INHALATION) at 12:26

## 2021-05-04 RX ADMIN — Medication: at 06:04

## 2021-05-04 RX ADMIN — ANTICOAGULANT CITRATE DEXTROSE SOLUTION FORMULA A 243 ML: 12.25; 11; 3.65 SOLUTION INTRAVENOUS at 17:55

## 2021-05-04 RX ADMIN — CEFEPIME HYDROCHLORIDE 2 G: 2 INJECTION, POWDER, FOR SOLUTION INTRAVENOUS at 22:54

## 2021-05-04 RX ADMIN — SERTRALINE HYDROCHLORIDE 200 MG: 100 TABLET ORAL at 10:07

## 2021-05-04 RX ADMIN — DICLOFENAC SODIUM 4 G: 10 GEL TOPICAL at 19:48

## 2021-05-04 RX ADMIN — ACETAMINOPHEN 650 MG: 325 TABLET, FILM COATED ORAL at 02:28

## 2021-05-04 RX ADMIN — CELECOXIB 100 MG: 100 CAPSULE ORAL at 12:34

## 2021-05-04 RX ADMIN — ARIPIPRAZOLE 2 MG: 2 TABLET ORAL at 10:07

## 2021-05-04 RX ADMIN — ALBUTEROL SULFATE 2.5 MG: 2.5 SOLUTION RESPIRATORY (INHALATION) at 20:13

## 2021-05-04 RX ADMIN — DEFERASIROX 1440 MG: 360 TABLET ORAL at 19:47

## 2021-05-04 RX ADMIN — VANCOMYCIN HYDROCHLORIDE 1500 MG: 10 INJECTION, POWDER, LYOPHILIZED, FOR SOLUTION INTRAVENOUS at 04:11

## 2021-05-04 RX ADMIN — ENOXAPARIN SODIUM 80 MG: 80 INJECTION SUBCUTANEOUS at 04:11

## 2021-05-04 ASSESSMENT — ACTIVITIES OF DAILY LIVING (ADL)
ADLS_ACUITY_SCORE: 15

## 2021-05-04 NOTE — PLAN OF CARE
Temp: 98.3  F (36.8  C) Temp src: Oral BP: 113/67 Pulse: 97   Resp: 28 SpO2: 95 % O2 Device: High Flow Nasal Cannula (HFNC) Oxygen Delivery: 20 LPM     Neuro: A&Ox4. Residual right sided weakness from previous CVA.  Cardiac: SR rates 80s-90s. VSS.   Respiratory: HFNC 50-70%. Tachypneic 20s-30s. LS crackles throughout.  GI/: Adequate urine output; jesica.  BM X1  Diet/appetite: Poor appetite, minimal/no PO intake.  Activity:  Assist of 1, up to commode.  Pain: At acceptable level on current regimen. Morphine PCA 1.5mg cont/1.5mg bumps q20min 7mg lockout.  Skin: No new deficits noted.  LDA's: R PIV x2; morphine pca and tko.    Plan: Continue with POC. Notify primary team with changes.

## 2021-05-04 NOTE — PLAN OF CARE
Neuro: A&Ox4-whispers.   Cardiac: SR. HR 80s VSS.   Respiratory: Sating mid 90s on HFNC 50-60% FiO2 20 LPM  GI/: Adequate urine output. No BM  Diet/appetite: Tolerating regular diet. Eating well.  Activity:  Assist of 1, up to chair and in halls.  Pain: At acceptable level on current regimen.   Skin: No new deficits noted.  LDA's: CVC left groin-heparin locked, R PIV-tko, RPIV-PCA    Plan: Continue with POC. Notify primary team with changes.

## 2021-05-04 NOTE — PROGRESS NOTES
Laboratory Medicine and Pathology  Transfusion Medicine - Apheresis Procedure     Jennifer Cervantes MRN# [id number]   YOB: 2000 Age: 21 year old         Reason for consult: Sickle cell anemia and acute chest syndrome           Assessment and Plan:   The patient is a 21 year old female with sickle cell anemia and acute chest syndrome who earlier underwent a red blood cell exchange on 4/30. We were consulted by hematology to perform an additional exchange as they were concerned she is still suffering from a sickle cell crisis/ACS    She tolerated the procedure well this evening. I ordered an extra red cell unit for transfusion if needed. Additional time is required to obtain units for this patient as per protocol the Blood Bank will attempt to obtain fresh, Hemoglobin S negative, Rh and K matched units if clinical condition permits.          Data:   Pre procedure  Hemoglobin 7.0  Low   11.7 - 15.7 g/dL Final 05/04/2021  4:35 PM 51   Hematocrit 21.9  Low   35.0 - 47.0 % Final 05/04/2021  4:35 PM      Post procedure  Hemoglobin 8.2  Low   11.7 - 15.7 g/dL Final 05/04/2021  7:00 PM 51   Hematocrit 24.2  Low   35.0 - 47.0 % Final 05/04/2021  7:00 PM              Procedure Summary:   A red blood cell exchange was performed with a Spectra Optia cell separator.  The vascular access was a left femoral vein catheter placed for the 4/30 exchange. ACD-A was used for anticoagulation. A red blood cell prime was used due to low pre procedure hemoglobin. The replacement fluid was 4 units of red blood cells (5 units total including prime). The target FCR was 50% with an end goal hematocrit of 23%. The RBC units were freshest available, Hemoglobin S negative, Rh and K matched, leukocyte reduced. She was given 50 mg IV diphenhydraminebefore the start of the procedure.  The patient's was stable throughout and tolerated the procedure well.     I was present/available for the entire procedure, and met with and  answered the patient's questions/concerns.    Eric Christy MD  5/4/2021 7:30 PM  Transfusion Medicine Fellow  Pager: [phone]

## 2021-05-04 NOTE — CONSULTS
Interventional Radiology Consult Service Note    Patient is on IR schedule 5/4 for a Left femoral NTCVC removal.   Labs WNL for procedure. COVID neg.   No NPO required.    [place] [phone] for estimated time of procedure.     This is a 20 yo F with PMHx of sickle cell anemia c/b CVA with residual RUE hemiparesis, and PE, iron overload 2/2 frequent transfusions, asthma, depression, and anxiety who presented with worsening pain, admitted on 4/26/2021 for acute sickle cell pain crisis, complicated by acute hypoxic respiratory failure likely due to acute chest syndrome vs community acquired pneumonia. IR placed left femoral NTCVC on 4/30. We are now consulted for line removal as it is no longer needed.    Expected date of discharge: TBD    Vitals:   /66 (BP Location: Left arm)   Pulse 80   Temp 97.8  F (36.6  C) (Oral)   Resp 24   Wt 78.6 kg (173 lb 4.5 oz)   SpO2 94%   BMI 29.74 kg/m      Pertinent Labs:     Lab Results   Component Value Date    WBC 13.6 (H) 05/04/2021    WBC 15.8 (H) 05/03/2021    WBC 17.3 (H) 05/02/2021       Lab Results   Component Value Date    HGB 6.6 05/04/2021    HGB 6.9 05/03/2021    HGB 6.8 05/02/2021       Lab Results   Component Value Date     05/04/2021     05/03/2021     05/02/2021       Lab Results   Component Value Date    INR 1.28 (H) 04/03/2021     (HH) 04/03/2021       Lab Results   Component Value Date    POTASSIUM 4.1 05/04/2021        EITAN Hedrick CNP  Interventional Radiology  Pager: [phone]

## 2021-05-04 NOTE — PROGRESS NOTES
IR follow-up note    Pt was on IR schedule for femoral CVC removal. Primary team has opted to keep this CVC for now and have cancelled removal.    Per Chhaya Vela PA-C.    Malgorzata Robles DNP, APRN  Interventional Radiology   IR on-call pager: [phone]

## 2021-05-04 NOTE — CONSULTS
Care Management Follow Up    Length of Stay (days): 8    Expected Discharge Date: 05/07/21     Concerns to be Addressed: housing resources  Patient plan of care discussed at interdisciplinary rounds: Yes    Anticipated Discharge Disposition: Home  Anticipated Discharge Services: PCA  Anticipated Discharge DME: None    Additional Information:  SW consulted for housing/lodging issues. SW met with Pt at bedside to introduce self and purpose of visit. Pt has been living with family but has been trying to find her own housing. Pt told SW that she has placed herself on every list there is to get housing and that she has a housing worker who is specifically working with her to find independent housing. Pt reported that she has been on the section 8 housing list for years and has no idea when she will be able to get her own housing. SW validated that this can be a frustrating and long process, but unfortunately there are no additional resources to offer. SW encouraged Pt to continue to work directly with her housing worker and to continue to reach out to [employer] to ensure she remains on the list.     Per chart review, pt's [facility] SW (Abdullahi Elliott, [facility]) also spoke with Pt re: housing issues/resources on 4/13/21. Pt was encouraged at that time to continue to f/u with her housing worker.    No other SW needs identified at this time; SW remains available if further needs arise during this hospitalization.     CARLOS Read, [facility]  [facility]   [place] Oates  Phone: [phone]  Pager: [phone]

## 2021-05-04 NOTE — PROGRESS NOTES
GENERAL ID SERVICE: PROGRESS NOTE  Patient:  Jennifer Cervantes, Date of birth 2000, Medical record number [id number]  Date of Admission: 4/26/2021  Date of Visit:  5/4/2021         Assessment and Recommendations:   Problem List:  # Sicke cell crisis with progressive lung infiltrates and CT chest from 5/3/21 showing bilateral GGO     # Port cath in place since 4/21/21 - CT from 5/3/21 also showing walled off fluid collection in the superior left chest wall port however more suggestive of seroma (per CT report) and blood cultures are all negative    # Worsening PE on anticoagulation     Discussion:     Ms. Jennifer Cervantes is a 21 year old female with PMHx of of sickle cell anemia, CVA with residual RUE hemiparesis, History of PE (was on xarelto in February 2021and then switched to eliquis on 3/25/21)  iron overload 2/2 frequent transfusions, asthma, depression and anxiety who presented to the [facility] ER with worsening pain concerning for an acute sickle cell pain crisis.  Patient has a several day history of worsening right sided pain concerning for sickle cell pain crises.  Unrelieved by home pain regimen. She endorsed pain primarily on her right side, mostly over her ribs and back. She was seen in the ED on  the day before her current presentation where she received some relief but was also seen again today by her hematology provider. She denies SOB as she was found to be acutely hypoxic in the clinic on the day of the admission. Upon arrival the patient febrile and hd white count of 12.9. Lactic acid was normal, procacitonin was 0.13 and CRP was 32. Blood cultures were obtained and are negative to date. COVID test was negative. CXR showed Increased right lower lung opacity suggesting infection, atelectasis or infarction. Patient was started on ceftriaxone and azithromycin for CAP coverage. In addition NM lung scan showed bilateral subsegmental perfusion defects, left greater than right, consistent with  "pulmonary emboli. Overall findings have worsened since the comparison perfusion scan on 2/1/2021, particularly within the left lung. Her eliquis was switched to lovenox. Hematology is following and the patient is also on deferasirox, hydroxyurea as well as pain regimen.      Patient continued to have white count elevation and fever from 4/28 to 4/30 and she underwent RBC exchange per hematology recommendation on 4/30. Patient is afebrile since 5/1. Her white count is still elevated at 15.8, but slightly down from yesterday (17.3). Renal function is normal. LFts are normal except for mildly elevated AST at 53 and T bili at 1.5 (likely from hemolysis). CT chest performed on 5/3/21 showed  \"Diffuse groundglass and consolidative opacities throughout the lungs, most pronounced in the dependent aspects of the lung fields.Findings are suspicious for infection, including atypical and viral infections such as COVID-19 pneumonia. Less likely to represent cardiogenic pulmonary edema given the lack of pleural effusion\". In addition the chest CT showed walled off fluid collection in the superior left chest wall port however more suggestive of seroma (per CT report). Blood cultures from 4/28, 4/29 are negative to date. New blood cultures were obtained today and are in process. Primary team consulted pulmonology and they ordered additional work up with Strep pneumo and Legionella urinary antigen, Blasto/Histo urnary antigen, BDG and serum Aspergillus Galactomannan 9all ordered today). COVID test was repeated today as well. In addition, a respiratory panel and sputum cultures were also collected. The patient still has significant chest pain in spite of opiate PCA and she has been requiring O2 supplementation through High flow nasal cannula with FiO2 ranfing between 50-70% since 4/30. Today she is requiring 50%. Pulmonology is deferring bronchoscopy for now given risk of progressive respiratory failure. Hematology is on board and " "recommended another exchange transfusion today.      Although the progressive lung findings could be multifactorial (ARDS, ACS, fluid overload..) the possibility of infectious process is still in the differential. I recommend to broaden the spectrum of antibiotics to cover more resistant and hospital acquired pathogens. Extensive work up is pending (see \"interval history). Please see plan below.         Recommendations:     1. Please continue cefepime 2 grams IV q 8h   2. I would favor stopping vancomycin given negative MRSA swab  3. If decision is made to proceed with bronchoscopy at any point, please obtain: cell count and differential, cytology, Gram stain, fungal stain, AFB stain, Bacterial (aerobic and anaerobic) cultures, fungal cultures and AFB cultures, Legionella PCR and culture, Nocardia stain and culture, Aspergillus Galactomannan from BAL, Pneumocystis PCR and DFA from BAL  4. Will follow pending cultures   5. Will follow pending tests (see \"interval history\")  5. I added Cryptococcus antigen today since it was not ordered guevara  6. I ordered Coccidioides urine antigen (just see serum antigen ordered)        The General ID team will continue to follow this patient. Please feel free to call with any questions.     Pham Massey MD  Date of Service: 05/04/21  Pager: [phone]          Interval History:     ID work up so far:    Resulted tests:  - Resp panel (5/3): negative  - COVID test: negative  - Strep pneumo and legionella ur antigen: negative  - MRSA negative    Pending tests:  - Sputum culture - needs to be collected  - Aspergillus Galactomannan  - Beta D glucan  - Blastomyces urine and serum antigen  - Histoplasma urine and serum antigen  - Coccidioides urine and serum antigen  - Fungal antibody panel  - Cryptococcus serum antigen  - CMV PCR from serum  - HSV PCR from serum  - VZV PCR from serum  - Pneumocystis PCR and DFA from sputum: needs to be collected  - Blood culture (4/29)           Physical " Exam:   /67 (BP Location: Left arm)   Pulse 97   Temp 98.3  F (36.8  C) (Oral)   Resp 28   Wt 75.2 kg (165 lb 11.2 oz)   SpO2 95%   BMI 28.44 kg/m         Exam:  GENERAL:  In pain (chest). On High flow by nasal cannula  HEAD: Normocephalic and atraumatic  ENT:  No hearing impairment  EYES:  Eyes grossly normal to inspection, PERRL and conjunctivae and sclerae normal   NECK:  Supple  LUNGS:  Diffuse crackles in both lungs  CARDIOVASCULAR:  Regular rate and rhythm, normal S1 S2,   ABDOMEN:  Soft, nontender, no hepatosplenomegaly, no masses and bowel sounds normal  EXT: Extremities warm and without edema.  MS: No gross musculoskeletal defects noted, no edema  SKIN:  No acute rashes or suspicious lesions  NEUROLOGIC:  Grossly nonfocal. Normal strength and tone, mentation intact and speech normal  PSYCHIATRIC: Mood stable, mentation appears normal, affect normal  HEMATOLOGIC/LYMPHATIC: No lymphadenopathy or bleeding              Laboratory Data:     Creatinine   Date Value Ref Range Status   05/04/2021 0.61 0.52 - 1.04 mg/dL Final   05/03/2021 0.55 0.52 - 1.04 mg/dL Final   05/02/2021 0.51 (L) 0.52 - 1.04 mg/dL Final   05/01/2021 0.52 0.52 - 1.04 mg/dL Final   04/29/2021 0.55 0.52 - 1.04 mg/dL Final     WBC   Date Value Ref Range Status   05/04/2021 13.6 (H) 4.0 - 11.0 10e9/L Final   05/03/2021 15.8 (H) 4.0 - 11.0 10e9/L Final   05/02/2021 17.3 (H) 4.0 - 11.0 10e9/L Final   05/01/2021 14.4 (H) 4.0 - 11.0 10e9/L Final   04/30/2021 20.4 (H) 4.0 - 11.0 10e9/L Final     Hemoglobin   Date Value Ref Range Status   05/04/2021 6.6 (LL) 11.7 - 15.7 g/dL Final     Comment:     This result has been called to LUPILLO PORTER RN 6B by Mavis Newsome on 05 04 2021 at 0531, and has been read back.        Platelet Count   Date Value Ref Range Status   05/04/2021 358 150 - 450 10e9/L Final     Lab Results   Component Value Date     05/04/2021    BUN 5 (L) 05/04/2021    CO2 29 05/04/2021     CRP Inflammation   Date  Value Ref Range Status   04/28/2021 32.0 (H) 0.0 - 8.0 mg/L Final   04/03/2021 8.8 (H) 0.0 - 8.0 mg/L Final   03/04/2021 6.8 0.0 - 8.0 mg/L Final   03/29/2020 21.0 (H) 0.0 - 8.0 mg/L Final           Pertinent Recent Microbiology Data:     Recent Labs   Lab 05/03/21  1713 05/03/21  1430 04/29/21  2205 04/29/21  2049 04/28/21  1858 04/28/21  1847   CULT  --   --  No growth after 5 days No growth after 5 days No growth No growth   SDES Nares Urine  Unspecified Urine Blood Right Hand Blood Left Hand Blood Right Hand Blood Left Arm            Imaging:     Recent Results (from the past 48 hour(s))   CT Chest w/o Contrast    Narrative    EXAMINATION: Chest CT  5/3/2021 11:14 AM    CLINICAL HISTORY: Respiratory illness, nondiagnostic xray    COMPARISON: Chest x-ray 5/2/2021.    TECHNIQUE: CT imaging obtained through the chest without contrast.  Axial, coronal, and sagittal reconstructions and axial MIP reformatted  images are reviewed.     CONTRAST: None    FINDINGS:  Left chest wall port catheter tip terminates at the superior  cavoatrial junction.    Lungs: The central tracheobronchial tree is patent. Diffuse mixed  groundglass and consolidative opacities throughout the lungs, most  pronounced in the dependent portion of the lung fields. No pleural  effusion or pneumothorax.    Mediastinum: The thyroid is unremarkable. Cardiac size is enlarged.  Left ventricular dilation. Small pericardial effusion. Normal caliber  of the aorta and main pulmonary artery. No significant coronary artery  calcium. Numerous enlarged mediastinal and hilar lymph nodes for  example 9 mm right paratracheal node (series 2, image 14), and a 10 mm  left hilar node (series 2, image 22).     Bones and soft tissues: No suspicious bone findings. Peripherally  walled off fluid collection in the left chest wall, located superior  to the chest wall port, measuring 2.5 x 1.5 cm.    Upper Abdomen: Limited evaluation of the upper abdomen. Several  enlarged  gastrohepatic nodes, example an 8 mm short axis node (series  2, image 51).      Impression    IMPRESSION:   1. Diffuse groundglass and consolidative opacities throughout the  lungs, most pronounced in the dependent aspects of the lung fields.  Findings are suspicious for infection, including atypical pneumonia.  Less likely to represent cardiogenic pulmonary edema given the lack of  pleural effusion. Also consider acute chest syndrome in a patient with  sickle cell.  2. Walled off fluid collection in the superior left chest wall, in the  region of the chest wall port. Findings may represent a postprocedural  seroma, less likely an abscess.  3. Enlarged thoracic and gastrohepatic lymph nodes. Findings may be  reactive in the setting of infection, or related to patient's  underlying sickle cell anemia.      I have personally reviewed the examination and initial interpretation  and I agree with the findings.    BRENDA CURTIS MD

## 2021-05-04 NOTE — PROGRESS NOTES
[facility]    Medicine Progress Note - Hospitalist Service, Gold 4       Date of Admission:  4/26/2021  Assessment & Plan       Jennifer Cervantes is a 22 yo F with PMHx of sickle cell anemia c/b CVA with residual RUE hemiparesis, and PE, iron overload 2/2 frequent transfusions, asthma, depression, and anxiety who presented with worsening pain, admitted on 4/26/2021 for acute sickle cell pain crisis, complicated by acute hypoxic respiratory failure likely due to acute chest syndrome vs community acquired pneumonia.     #Acute hypoxic respiratory failure  Patient initially requiring 2-3L NC, with significant increase in O2 requirement to 20L 50-60 FiO2 HFNC. Likely multifactorial. COVID negative on admission. Found to have worsening PE as noted below. CXR with RLL pneumonia, likely CAP, but also concern for acute chest s/p exchange transfusion with minimal improvement. TTE on 5/2 normal. Repeat CXR with increased diffuse bilateral pulmonary opacities with bilateral pleural effusions, possibly due to pulmonary edema, but minimal improvement with diuresis. CT chest w/o contrast with diffuse GGO and consolidated opacities concerning for infection. Procal repeated and rising to 0.44. Repeat COVID negative. RVP negative. Step and legionella urinary antigen negative. Differential broad with atypical/fungal infection. Less likely but possible Lung injury from transfusion exchange. Discussed with transfusion medicine, who recommended to continue supportive care.   - Pulmonary consult, appreciate recs - possibly start steroids if fungal infx negatvie  - Infectious disease consult, appreciate recs  - Hold further diuretics  - Broadened antibiotics Cefepime/Vancomycin (day 2)   - Anticoagulation with lovenox as noted below  - Continue HFNC and wean as able   - Follow up sputum cx if able (dry cough), Beta D glucan, Galactomannan, histo antigen urinary and serum, blasto antigen urine and  serum, coccidioides serum and urine antigen, HSV and VZV PCR blood, CMV PCR blood, Pneumocystis CPR and DFA from sputum   - Acapella and pulmonary toilet     #Acute sickle cell crisis   #Acute chest syndrome s/p apheresis transfusion 4/30/2021   HgbSS c/b frequent pain crisis, hx of CVA with RUE hemiparesis, iron overload, and DVT/PE. Presented with pain over right side on ribs and back due to sickle cell crisis. Treated with Morphine PCA.  Hospital course complicated increase O2 requirement and fevers, CXR with RLL opacity possibly due to community acquired pneumonia, but also concern for acute chest s/p exchange transfusion on 4/30. No fevers >72 hours.   -Abx as noted above  -Hematology following, and recommend another transfusion exchange today   -Pain management:    -Morphine PCA. Continuous rate 1.5 mg/hr, PCA dose 1.5 mg Q20 mins, (1 hour limit 7 mg)    -Celebrex 100 mg BID, Tylenol 650 mg Q6H PRN   -Bowel regimen  -Benadryl PRN    -Continue hydroxyurea 2000 mg daily     #Worsening PE - Admitted on 2/1-2/3/2021 with a new PE and started on DOAC (initally on rivaroxaban and later switched to Eliquis after finding RUE DVT for possible rivaroxaban failure). Patient reports compliance with anticoagulation. On this admission, pt found to have bilateral subsegmental perfusion defects on VQ scan, worse compared to prior VQ scan on 2/1.   -Hold Eliquis, possibly failure due to poor absorption?   -Therapeutic dosing Lovenox 1 mg/kg (80 mg) Q12h dosing     #Hx of iron overload - Continue PTA Deferasirox 1440 mg daily     #Asthma - Continue PTA Symbicort (formulary equivalent Breo Ellipta) and albuterol     #Anxiety  #Depression  - Continue PTA Sertraline 200 mg daily and Abilify 2 mg daily          Diet: Regular Diet Adult    DVT Prophylaxis: Enoxaparin (Lovenox) SQ  Lopez Catheter: not present  Code Status: Full Code           Disposition Plan   Expected discharge: 4 - 7 days, recommended to prior living arrangement  once adequate pain management/ tolerating PO medications, antibiotic plan established and O2 use less than 1 liters/minute.  Entered: Chhaya Vela PA-C 05/04/2021, 9:42 AM       The patient's care was discussed with the Attending Physician, Dr. Ginna Valle, Bedside Nurse, Patient and Pulmonary, Hematology, and transfusion medicine  Consultant.    Chhaya Vela PA-C  Hospitalist Service, [address]  Contact information available via [facility] Paging/Directory  Please see sign in/sign out for up to date coverage information  ______________________________________________________________________    Interval History   Patient states she is still having mid back pain, but its better controlled with PCA dosing and would like to stay at her current regimen. States her breathing is feeling better. Still having significant ORTEGA. Denies any F/C, CP, or abdominal pain. Last BM yesterday. Eating and drinking well.     Data reviewed today: I reviewed all medications, new labs and imaging results over the last 24 hours.     Physical Exam   Vital Signs: Temp: 98.6  F (37  C) Temp src: Axillary BP: 101/62 Pulse: 81   Resp: 23 SpO2: 96 % O2 Device: High Flow Nasal Cannula (HFNC) Oxygen Delivery: 20 LPM  Weight: 173 lbs 4.5 oz  GENERAL: Alert and oriented x 3. Lying in bed, sleepy. Pleasant and conversational   HEENT: Anicteric sclera. Mucous membranes moist   CARDIOVASCULAR: RRR. S1, S2. No murmurs, rubs, or gallops.   RESPIRATORY: Effort normal on HFNC. Rales in LLL and Diffuse rales in R lung. No wheezing. Mild tachypnea.    GI: Abdomen soft, non-tender abdomen without rebound or guarding, no hepatosplenomegaly, hypoactive bowel sounds present  EXTREMITIES: No peripheral edema. No calf asymmetry, erythema, or tenderness.   NEUROLOGICAL: CN II-XII grossly intact. Moving all extremities symmetrically.   SKIN: Intact. Warm and dry.  No jaundice.     Data   Recent Labs    Lab 05/04/21  0450 05/03/21  1936 05/03/21  0502 05/02/21  0736 04/29/21  0344 04/29/21  0344 04/28/21  1850   WBC 13.6*  --  15.8* 17.3*   < > 19.1*  --    HGB 6.6*  --  6.9* 6.8*   < > 7.2*  --    MCV 86  --  87 88   < > 89  --      --  297 250   < > 298  --      --  140 140   < > 134  --    POTASSIUM 4.1 3.2* 3.2* 3.6   < > 3.8  --    CHLORIDE 106  --  104 106   < > 101  --    CO2 29  --  28 28   < > 26  --    BUN 5*  --  4* 4*   < > 7  --    CR 0.61  --  0.55 0.51*   < > 0.55  --    ANIONGAP 4  --  8 6   < > 6  --    MICAH 8.5  --  8.3* 8.6   < > 8.7  --    GLC 80  --  96 90   < > 102*  --    ALBUMIN 2.6*  --  2.6*  --   --   --   --    PROTTOTAL 6.3*  --  6.6*  --   --   --   --    BILITOTAL 1.6*  --  1.5*  --    < >  --   --    ALKPHOS 105  --  86  --   --   --   --    ALT 44  --  44  --   --   --   --    AST 57*  --  53*  --   --   --   --    TROPI  --   --   --   --   --  <0.015 <0.015    < > = values in this interval not displayed.     Recent Results (from the past 24 hour(s))   CT Chest w/o Contrast    Narrative    EXAMINATION: Chest CT  5/3/2021 11:14 AM    CLINICAL HISTORY: Respiratory illness, nondiagnostic xray    COMPARISON: Chest x-ray 5/2/2021.    TECHNIQUE: CT imaging obtained through the chest without contrast.  Axial, coronal, and sagittal reconstructions and axial MIP reformatted  images are reviewed.     CONTRAST: None    FINDINGS:  Left chest wall port catheter tip terminates at the superior  cavoatrial junction.    Lungs: The central tracheobronchial tree is patent. Diffuse mixed  groundglass and consolidative opacities throughout the lungs, most  pronounced in the dependent portion of the lung fields. No pleural  effusion or pneumothorax.    Mediastinum: The thyroid is unremarkable. Cardiac size is enlarged.  Left ventricular dilation. Small pericardial effusion. Normal caliber  of the aorta and main pulmonary artery. No significant coronary artery  calcium. Numerous enlarged  mediastinal and hilar lymph nodes for  example 9 mm right paratracheal node (series 2, image 14), and a 10 mm  left hilar node (series 2, image 22).     Bones and soft tissues: No suspicious bone findings. Peripherally  walled off fluid collection in the left chest wall, located superior  to the chest wall port, measuring 2.5 x 1.5 cm.    Upper Abdomen: Limited evaluation of the upper abdomen. Several  enlarged gastrohepatic nodes, example an 8 mm short axis node (series  2, image 51).      Impression    IMPRESSION:   1. Diffuse groundglass and consolidative opacities throughout the  lungs, most pronounced in the dependent aspects of the lung fields.  Findings are suspicious for infection, including atypical pneumonia.  Less likely to represent cardiogenic pulmonary edema given the lack of  pleural effusion. Also consider acute chest syndrome in a patient with  sickle cell.  2. Walled off fluid collection in the superior left chest wall, in the  region of the chest wall port. Findings may represent a postprocedural  seroma, less likely an abscess.  3. Enlarged thoracic and gastrohepatic lymph nodes. Findings may be  reactive in the setting of infection, or related to patient's  underlying sickle cell anemia.      I have personally reviewed the examination and initial interpretation  and I agree with the findings.    BRENDA CURTIS MD     Medications     morphine       - MEDICATION INSTRUCTIONS -       sodium chloride 10 mL/hr at 05/02/21 1500       acetaminophen  650 mg Oral Q4H     albuterol  2.5 mg Nebulization 4x Daily     ARIPiprazole  2 mg Oral Daily     ceFEPIme (MAXIPIME) IV  2 g Intravenous Q8H     celecoxib  100 mg Oral BID     deferasirox  1,440 mg Oral QPM     enoxaparin ANTICOAGULANT  1 mg/kg Subcutaneous Q12H     fluticasone-vilanterol  1 puff Inhalation QPM     heparin  5 mL Intracatheter Q24H     heparin  5 mL Intracatheter Q24H     hydroxyurea  2,000 mg Oral Daily     lidocaine  1 patch Transdermal  Q24h    And     lidocaine   Transdermal Q8H     polyethylene glycol  17 g Oral Daily     senna-docusate  1 tablet Oral BID    Or     senna-docusate  2 tablet Oral BID     sertraline  200 mg Oral Daily     sodium chloride (PF)  3 mL Intracatheter Q8H     vancomycin (VANCOCIN) IV  1,500 mg Intravenous Q12H

## 2021-05-04 NOTE — PROGRESS NOTES
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     Since admission she had worsening PE with low Apixaban level so was switched to Lovenox. She also had exchange transfusion for suspected acute chest syndrome on 4/30. Her Hgb remains the same but her respiratory failure is not improving. She was treated for pulmonary edema and now her chest CT on 5/3/21 shows diffuse groundglass and consolidative opacities throughout the lungs, most pronounced in the dependent aspects of the lung fields. Findings are suspicious for infection, including atypical pneumonia. Less likely to represent cardiogenic pulmonary edema given the lack of pleural effusion. Also consider ongoing acute chest syndrome.    Patient feels slightly better today but still on 20L oxygen. I talked to her primary team and pulmonary team in person. Although she has history of asthma she has no wheezing and does not cough much, so we will try another exchange transfusion today for ongoing acute chest syndrome. This is discussed with transfusion medicine, Dr. Hernandez and Dr. Duncan.     # Worsening PE  - 4/27 V/Q scan shows bilateral subsegmental perfusion defects, left greater  than right, consistent with pulmonary emboli. Overall findings have  worsened since the comparison  perfusion scan on 2/1/2021, particularly  within the left lung.   - She was admitted 2/1/21-2/3/21 with a new PE, started on Rivaroxaban, switched to Eliquis 3/25/21 with finding of RUE DVT (? rivaroxaban failure). She only held Eliquis for 1 day for Left Port Removal and Left Port Placement on 4/21.   -She states that she was compliant with all Eliquis so it was continued since admission.  We checked her Apixaban level 4/29 and it was 29, much lower than expected, presumably due to poor absorption.  Switched to Lovenox 4/29 PM.  - continue Lovenox   - will need to decide on oral option when she is better    # Acute respiratory failure with hypoxia due to acute chest syndrome, CAP, fluid overload and anemia. TTE on 5/2 is not suggesting pulmonary hypertension or CHF.   # Acute Chest Syndrome  # Community acquired PNA  # Sickle cell pain crisis  # Iron overload  - s/p RBC exchange on 4/30 d/t persistent fevers and increasing O2 requirements   - continue oxygen  - pulmonary consulted with possible steroids use if she is not improving  - ID on board and she is now on Vanc andcefepime, pending fungal infection work up.   - will give another exchange transfusion since she is not improving with above treatment. Simple transfusion to keep HGB up is considered but she is iron overloaded and it will not help her much since we are still thinking acute chest syndrome to be  A contributing cause of her acute respiratory failure with hypoxia.        Recommendations:   - continue pain regimen per primary team  - continue Lovenox 1mg/kg; will need to determine outpatient anticoagulation closer to discharge   - exchange transfusion today at 4pm  - daily CBC with retic count    Patient was seen and plan of care was discussed with attending physician  .    We will continue to follow this patient. Please don't hesitate to contact the Fellow On-Call with questions.    Juliette Miles MD, PhD  Hematology/Oncology   Pager:  [phone]        HEMATOLOGY STAFF:  Seen with fellow, whose note reflects our joint evaluation, assessment, and plan.    Overall feels slightly better today, but continues to require high flow oxygen.  CT scan yesterday shows diffuse infiltrates in both lungs.  Overall, her respiratory status has not really improved over the last several days in spite of being treated for acute chest syndrome, fluid overload, and infection.  After discussion today with her primary hematologist Dr. Duncan, we decided together that another exchange transfusion would be reasonable to be confident that we have adequately addressed acute chest syndrome.      Jose Manuel Hernandez MD  Associate Professor of Medicine  Division of Hematology, Oncology, and Transplantation  Director, [facility]  __________________________________________________________________    Subjective & Interval History:    Patient remains on high flow oxygen and she looks tired. She states that she feels slightly better. She has occasional dry cough.     Physical Exam:    /62 (BP Location: Left arm)   Pulse 81   Temp 98.6  F (37  C) (Axillary)   Resp 23   Wt 78.6 kg (173 lb 4.5 oz)   SpO2 96%   BMI 29.74 kg/m    Gen: in pain, on HF oxygen  CV: tachycardia  Pulm: Tachypnic, coarse breath sounds, no wheezing  Neuro: Alert and answering questions appropriately.     Labs & Studies: I personally reviewed the following studies:  ROUTINE LABS (Last four results):  CMP  Recent Labs   Lab 05/04/21  0450 05/03/21  1936 05/03/21  0502 05/02/21  0736 05/01/21  0539 04/30/21  0548     --  140 140 137  --    POTASSIUM 4.1 3.2* 3.2* 3.6 3.7  --    CHLORIDE 106  --  104 106 105  --    CO2 29  --  28 28 28  --    ANIONGAP 4  --  8 6 4  --    GLC 80  --  96 90 91  --    BUN 5*  --  4* 4* 5*  --    CR 0.61  --  0.55 0.51* 0.52  --    GFRESTIMATED >90  --  >90 >90 >90  --    GFRESTBLACK >90  --  >90 >90 >90  --    MICAH 8.5  --  8.3* 8.6  8.0*  --    PROTTOTAL 6.3*  --  6.6*  --   --   --    ALBUMIN 2.6*  --  2.6*  --   --   --    BILITOTAL 1.6*  --  1.5*  --   --  3.8*   ALKPHOS 105  --  86  --   --   --    AST 57*  --  53*  --   --   --    ALT 44  --  44  --   --   --      CBC  Recent Labs   Lab 05/04/21  0450 05/03/21  0502 05/02/21  0736 05/01/21  0539   WBC 13.6* 15.8* 17.3* 14.4*   RBC 2.42* 2.43* 2.38* 2.27*   HGB 6.6* 6.9* 6.8* 6.7*   HCT 20.7* 21.1* 20.9* 19.8*   MCV 86 87 88 87   MCH 27.3 28.4 28.6 29.5   MCHC 31.9 32.7 32.5 33.8   RDW 22.9* 22.4* 21.1* 19.1*    297 250 175     INRNo lab results found in last 7 days.    Medications list for reference:  Current Facility-Administered Medications   Medication     acetaminophen (TYLENOL) tablet 650 mg     acetaminophen (TYLENOL) tablet 650 mg     albuterol (PROAIR HFA/PROVENTIL HFA/VENTOLIN HFA) 108 (90 Base) MCG/ACT inhaler 2 puff     albuterol (PROVENTIL) neb solution 2.5 mg     ARIPiprazole (ABILIFY) tablet 2 mg     ceFEPIme (MAXIPIME) 2 g vial to attach to  ml bag for ADULTS or 50 ml bag for PEDS     celecoxib (celeBREX) capsule 100 mg     deferasirox (JADENU) tablet 1,440 mg     diclofenac (VOLTAREN) 1 % topical gel 4 g     diphenhydrAMINE (BENADRYL) capsule 25 mg     enoxaparin ANTICOAGULANT (LOVENOX) injection 80 mg     fluticasone-vilanterol (BREO ELLIPTA) 200-25 MCG/INH inhaler 1 puff     heparin 100 UNIT/ML injection 5 mL     heparin 100 UNIT/ML injection 5 mL     hydroxyurea (HYDREA) capsule 2,000 mg     Lidocaine (LIDOCARE) 4 % Patch 1 patch    And     lidocaine patch in PLACE     lidocaine (LMX4) cream     lidocaine 1 % 0.1-1 mL     melatonin tablet 1 mg     morphine  PCA 1 mg/mL OPIOID TOLERANT     ondansetron (ZOFRAN) injection 4-8 mg     Patient is already receiving anticoagulation with heparin, enoxaparin (LOVENOX), warfarin (COUMADIN)  or other anticoagulant medication     polyethylene glycol (MIRALAX) Packet 17 g     senna-docusate (SENOKOT-S/PERICOLACE) 8.6-50  MG per tablet 1 tablet    Or     senna-docusate (SENOKOT-S/PERICOLACE) 8.6-50 MG per tablet 2 tablet     sertraline (ZOLOFT) tablet 200 mg     sodium chloride (PF) 0.9% PF flush 3 mL     sodium chloride (PF) 0.9% PF flush 3 mL     sodium chloride 0.9% infusion     vancomycin 1500 mg in 0.9% NaCl 250 ml intermittent infusion 1,500 mg     5/3/21 CT chest:   IMPRESSION:   1. Diffuse groundglass and consolidative opacities throughout the  lungs, most pronounced in the dependent aspects of the lung fields.  Findings are suspicious for infection, including atypical pneumonia.  Less likely to represent cardiogenic pulmonary edema given the lack of  pleural effusion. Also consider acute chest syndrome in a patient with  sickle cell.  2. Walled off fluid collection in the superior left chest wall, in the  region of the chest wall port. Findings may represent a postprocedural  seroma, less likely an abscess.  3. Enlarged thoracic and gastrohepatic lymph nodes. Findings may be  reactive in the setting of infection, or related to patient's  underlying sickle cell anemia.

## 2021-05-05 ENCOUNTER — APPOINTMENT (OUTPATIENT)
Dept: GENERAL RADIOLOGY | Facility: CLINIC | Age: 21
DRG: 811 | End: 2021-05-05
Attending: PHYSICIAN ASSISTANT
Payer: COMMERCIAL

## 2021-05-05 LAB
1,3 BETA GLUCAN SER-MCNC: 38 PG/ML
ALBUMIN SERPL-MCNC: 2.6 G/DL (ref 3.4–5)
ALBUMIN UR-MCNC: NEGATIVE MG/DL
ALP SERPL-CCNC: 106 U/L (ref 40–150)
ALT SERPL W P-5'-P-CCNC: 41 U/L (ref 0–50)
ANION GAP SERPL CALCULATED.3IONS-SCNC: 7 MMOL/L (ref 3–14)
APPEARANCE UR: CLEAR
AST SERPL W P-5'-P-CCNC: 57 U/L (ref 0–45)
B-D GLUCAN INTERPRETATION (1,3): NEGATIVE
BACTERIA SPEC CULT: NO GROWTH
BACTERIA SPEC CULT: NO GROWTH
BILIRUB SERPL-MCNC: 1.7 MG/DL (ref 0.2–1.3)
BILIRUB UR QL STRIP: NEGATIVE
BUN SERPL-MCNC: 4 MG/DL (ref 7–30)
CALCIUM SERPL-MCNC: 8.8 MG/DL (ref 8.5–10.1)
CHLORIDE SERPL-SCNC: 104 MMOL/L (ref 94–109)
CO2 SERPL-SCNC: 26 MMOL/L (ref 20–32)
COLOR UR AUTO: YELLOW
CREAT SERPL-MCNC: 0.47 MG/DL (ref 0.52–1.04)
CRP SERPL-MCNC: 110 MG/L (ref 0–8)
ERYTHROCYTE [DISTWIDTH] IN BLOOD BY AUTOMATED COUNT: 17.7 % (ref 10–15)
GALACTOMANNAN AG SERPL QL IA: NEGATIVE
GALACTOMANNAN AG SERPL-ACNC: 0.08
GFR SERPL CREATININE-BSD FRML MDRD: >90 ML/MIN/{1.73_M2}
GLUCOSE SERPL-MCNC: 87 MG/DL (ref 70–99)
GLUCOSE UR STRIP-MCNC: NEGATIVE MG/DL
HCT VFR BLD AUTO: 23.9 % (ref 35–47)
HGB BLD-MCNC: 7.8 G/DL (ref 11.7–15.7)
HGB UR QL STRIP: NEGATIVE
HSV1 DNA SPEC QL NAA+PROBE: NEGATIVE
HSV2 DNA SPEC QL NAA+PROBE: NEGATIVE
HYALINE CASTS #/AREA URNS LPF: 1 /LPF (ref 0–2)
KETONES UR STRIP-MCNC: 40 MG/DL
LAB SCANNED RESULT: ABNORMAL
LAB SCANNED RESULT: ABNORMAL
LACTATE BLD-SCNC: 0.6 MMOL/L (ref 0.7–2)
LEUKOCYTE ESTERASE UR QL STRIP: NEGATIVE
MCH RBC QN AUTO: 28.4 PG (ref 26.5–33)
MCHC RBC AUTO-ENTMCNC: 32.6 G/DL (ref 31.5–36.5)
MCV RBC AUTO: 87 FL (ref 78–100)
MUCOUS THREADS #/AREA URNS LPF: PRESENT /LPF
NITRATE UR QL: NEGATIVE
PH UR STRIP: 6.5 PH (ref 5–7)
PLATELET # BLD AUTO: 253 10E9/L (ref 150–450)
POTASSIUM SERPL-SCNC: 4 MMOL/L (ref 3.4–5.3)
PROT SERPL-MCNC: 6.8 G/DL (ref 6.8–8.8)
RBC # BLD AUTO: 2.75 10E12/L (ref 3.8–5.2)
RBC #/AREA URNS AUTO: 1 /HPF (ref 0–2)
RETICS # AUTO: 199.7 10E9/L (ref 25–95)
RETICS/RBC NFR AUTO: 7.3 % (ref 0.5–2)
SODIUM SERPL-SCNC: 137 MMOL/L (ref 133–144)
SOURCE: ABNORMAL
SP GR UR STRIP: 1.01 (ref 1–1.03)
SPECIMEN SOURCE: NORMAL
SQUAMOUS #/AREA URNS AUTO: 1 /HPF (ref 0–1)
UROBILINOGEN UR STRIP-MCNC: 2 MG/DL (ref 0–2)
WBC # BLD AUTO: 13.2 10E9/L (ref 4–11)
WBC #/AREA URNS AUTO: 1 /HPF (ref 0–5)

## 2021-05-05 PROCEDURE — 94640 AIRWAY INHALATION TREATMENT: CPT

## 2021-05-05 PROCEDURE — 250N000011 HC RX IP 250 OP 636: Performed by: PHYSICIAN ASSISTANT

## 2021-05-05 PROCEDURE — 258N000003 HC RX IP 258 OP 636: Performed by: PHYSICIAN ASSISTANT

## 2021-05-05 PROCEDURE — 120N000002 HC R&B MED SURG/OB UMMC

## 2021-05-05 PROCEDURE — 72070 X-RAY EXAM THORAC SPINE 2VWS: CPT | Mod: 26 | Performed by: STUDENT IN AN ORGANIZED HEALTH CARE EDUCATION/TRAINING PROGRAM

## 2021-05-05 PROCEDURE — 999N000157 HC STATISTIC RCP TIME EA 10 MIN

## 2021-05-05 PROCEDURE — 99233 SBSQ HOSP IP/OBS HIGH 50: CPT | Mod: GC | Performed by: INTERNAL MEDICINE

## 2021-05-05 PROCEDURE — 999N000215 HC STATISTIC HFNC ADULT NON-CPAP

## 2021-05-05 PROCEDURE — 99207 PR APP CREDIT; MD BILLING SHARED VISIT: CPT | Performed by: PHYSICIAN ASSISTANT

## 2021-05-05 PROCEDURE — 87449 NOS EACH ORGANISM AG IA: CPT | Performed by: INTERNAL MEDICINE

## 2021-05-05 PROCEDURE — 85045 AUTOMATED RETICULOCYTE COUNT: CPT | Performed by: PHYSICIAN ASSISTANT

## 2021-05-05 PROCEDURE — 80053 COMPREHEN METABOLIC PANEL: CPT | Performed by: PHYSICIAN ASSISTANT

## 2021-05-05 PROCEDURE — 94640 AIRWAY INHALATION TREATMENT: CPT | Mod: 76

## 2021-05-05 PROCEDURE — 250N000013 HC RX MED GY IP 250 OP 250 PS 637: Performed by: PHYSICIAN ASSISTANT

## 2021-05-05 PROCEDURE — 250N000009 HC RX 250: Performed by: PHYSICIAN ASSISTANT

## 2021-05-05 PROCEDURE — 86140 C-REACTIVE PROTEIN: CPT | Performed by: PHYSICIAN ASSISTANT

## 2021-05-05 PROCEDURE — 83605 ASSAY OF LACTIC ACID: CPT | Performed by: PHYSICIAN ASSISTANT

## 2021-05-05 PROCEDURE — 81001 URINALYSIS AUTO W/SCOPE: CPT | Performed by: PHYSICIAN ASSISTANT

## 2021-05-05 PROCEDURE — 999N000043 HC STATISTIC CTO2 CONT OXYGEN TECH TIME EA 90 MIN

## 2021-05-05 PROCEDURE — 85027 COMPLETE CBC AUTOMATED: CPT | Performed by: PHYSICIAN ASSISTANT

## 2021-05-05 PROCEDURE — 72070 X-RAY EXAM THORAC SPINE 2VWS: CPT

## 2021-05-05 PROCEDURE — 250N000013 HC RX MED GY IP 250 OP 250 PS 637: Performed by: PEDIATRICS

## 2021-05-05 PROCEDURE — 99233 SBSQ HOSP IP/OBS HIGH 50: CPT | Performed by: INTERNAL MEDICINE

## 2021-05-05 PROCEDURE — 36415 COLL VENOUS BLD VENIPUNCTURE: CPT | Performed by: PHYSICIAN ASSISTANT

## 2021-05-05 PROCEDURE — 99232 SBSQ HOSP IP/OBS MODERATE 35: CPT | Mod: GC | Performed by: PEDIATRICS

## 2021-05-05 RX ORDER — SODIUM CHLORIDE 9 MG/ML
INJECTION, SOLUTION INTRAVENOUS CONTINUOUS
Status: DISCONTINUED | OUTPATIENT
Start: 2021-05-05 | End: 2021-05-07

## 2021-05-05 RX ORDER — SODIUM CHLORIDE 9 MG/ML
INJECTION, SOLUTION INTRAVENOUS CONTINUOUS
Status: DISCONTINUED | OUTPATIENT
Start: 2021-05-05 | End: 2021-05-05

## 2021-05-05 RX ADMIN — CELECOXIB 100 MG: 100 CAPSULE ORAL at 11:45

## 2021-05-05 RX ADMIN — ENOXAPARIN SODIUM 80 MG: 80 INJECTION SUBCUTANEOUS at 04:21

## 2021-05-05 RX ADMIN — ENOXAPARIN SODIUM 80 MG: 80 INJECTION SUBCUTANEOUS at 18:01

## 2021-05-05 RX ADMIN — ALBUTEROL SULFATE 2.5 MG: 2.5 SOLUTION RESPIRATORY (INHALATION) at 09:12

## 2021-05-05 RX ADMIN — SERTRALINE HYDROCHLORIDE 200 MG: 100 TABLET ORAL at 10:14

## 2021-05-05 RX ADMIN — CEFEPIME HYDROCHLORIDE 2 G: 2 INJECTION, POWDER, FOR SOLUTION INTRAVENOUS at 17:58

## 2021-05-05 RX ADMIN — HYDROXYUREA 2000 MG: 500 CAPSULE ORAL at 10:15

## 2021-05-05 RX ADMIN — Medication 5 ML: at 11:45

## 2021-05-05 RX ADMIN — ALBUTEROL SULFATE 2.5 MG: 2.5 SOLUTION RESPIRATORY (INHALATION) at 21:26

## 2021-05-05 RX ADMIN — SODIUM CHLORIDE 500 ML: 9 INJECTION, SOLUTION INTRAVENOUS at 18:04

## 2021-05-05 RX ADMIN — ACETAMINOPHEN 650 MG: 325 TABLET, FILM COATED ORAL at 10:14

## 2021-05-05 RX ADMIN — Medication: at 12:45

## 2021-05-05 RX ADMIN — ALBUTEROL SULFATE 2.5 MG: 2.5 SOLUTION RESPIRATORY (INHALATION) at 17:09

## 2021-05-05 RX ADMIN — ARIPIPRAZOLE 2 MG: 2 TABLET ORAL at 10:14

## 2021-05-05 RX ADMIN — ALBUTEROL SULFATE 2.5 MG: 2.5 SOLUTION RESPIRATORY (INHALATION) at 12:01

## 2021-05-05 RX ADMIN — SODIUM CHLORIDE: 9 INJECTION, SOLUTION INTRAVENOUS at 11:45

## 2021-05-05 RX ADMIN — CEFEPIME HYDROCHLORIDE 2 G: 2 INJECTION, POWDER, FOR SOLUTION INTRAVENOUS at 07:07

## 2021-05-05 ASSESSMENT — ACTIVITIES OF DAILY LIVING (ADL)
ADLS_ACUITY_SCORE: 15
ADLS_ACUITY_SCORE: 16
ADLS_ACUITY_SCORE: 15
ADLS_ACUITY_SCORE: 15

## 2021-05-05 NOTE — PROGRESS NOTES
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     Since admission she had worsening PE with low Apixaban level so was switched to Lovenox. She also had exchange transfusion for suspected acute chest syndrome on 4/30. Her Hgb remains the same but her respiratory failure is not improving. She was treated for pulmonary edema and now her chest CT on 5/3/21 shows diffuse groundglass and consolidative opacities throughout the lungs, most pronounced in the dependent aspects of the lung fields. Findings are suspicious for infection, including atypical pneumonia. Less likely to represent cardiogenic pulmonary edema given the lack of pleural effusion. Also consider ongoing acute chest syndrome. So another exchange transfusion was done on 5/4 after discussion with pulmonary team, Dr. Hernandez and Dr. Duncan. Her oxygen requirement dramatically improved from 20L to 1-2L.     # Worsening PE  - 4/27 V/Q scan shows bilateral subsegmental perfusion defects, left greater  than right, consistent with pulmonary emboli. Overall findings have  worsened since the comparison perfusion scan on 2/1/2021, particularly  within the left lung.   - She was admitted 2/1/21-2/3/21 with a new PE, started on Rivaroxaban, switched to Eliquis 3/25/21 with finding of RUELDA  DVT (? rivaroxaban failure). She only held Eliquis for 1 day for Left Port Removal and Left Port Placement on 4/21.   -She states that she was compliant with all Eliquis so it was continued since admission.  We checked her Apixaban level 4/29 and it was 29, much lower than expected, presumably due to poor absorption.  Switched to Lovenox 4/29 PM.  - continue Lovenox   - will need to decide on oral option when she is better    # Acute respiratory failure with hypoxia due to acute chest syndrome, CAP, fluid overload and anemia. TTE on 5/2 is not suggesting pulmonary hypertension or CHF.   # Acute Chest Syndrome: need exchange transfusion on 4/30 and 5/4 with her oxygen level down from 20L to 1-2L.   # Community acquired PNA  # Sickle cell pain crisis: stable.   # Iron overload  - s/p RBC exchange on 4/30 d/t persistent fevers and increasing O2 requirements   - s/p exchange transfusion on 5/4  - continue oxygen  - pulmonary consulted with possible steroids use if she is not improving  - ID on board and she is now on Vanc and cefepime, pending fungal infection work up.     Recommendations:   - continue pain regimen per primary team  - continue Lovenox 1mg/kg; will need to determine outpatient anticoagulation closer to discharge   - daily CBC with retic count  - agree with UA  - encourage ambulation or PT    Patient was seen and plan of care was discussed with attending physician Dr. Duncan.    We will continue to follow this patient. Please don't hesitate to contact the Fellow On-Call with questions.    Juliette Miles MD, PhD  Hematology/Oncology   Pager: [phone]    _________________________________________________________________    Subjective & Interval History:    Her oxygen level is down from 20L to 1-2 L since midnight. Her pain is not controlled comparing to yesterday. She shared her thought with Dr. Duncan.     Physical Exam:    /52 (BP Location: Left arm)   Pulse 107   Temp 98.5  F (36.9  C) (Oral)    Resp 22   Wt 74.3 kg (163 lb 12.8 oz)   SpO2 94%   BMI 28.12 kg/m    Gen: comfortably lying on bed, on NC 1L  CV: tachycardia  Pulm: Tachypnic, coarse breath sounds, no wheezing  Neuro: Alert and answering questions appropriately.     Labs & Studies: I personally reviewed the following studies:  ROUTINE LABS (Last four results):  CMP  Recent Labs   Lab 05/05/21  0543 05/04/21  0450 05/03/21  1936 05/03/21 0502 05/02/21  0736 04/30/21  0548 04/30/21  0548    140  --  140 140   < >  --    POTASSIUM 4.0 4.1 3.2* 3.2* 3.6   < >  --    CHLORIDE 104 106  --  104 106   < >  --    CO2 26 29  --  28 28   < >  --    ANIONGAP 7 4  --  8 6   < >  --    GLC 87 80  --  96 90   < >  --    BUN 4* 5*  --  4* 4*   < >  --    CR 0.47* 0.61  --  0.55 0.51*   < >  --    GFRESTIMATED >90 >90  --  >90 >90   < >  --    GFRESTBLACK >90 >90  --  >90 >90   < >  --    MICAH 8.8 8.5  --  8.3* 8.6   < >  --    PROTTOTAL 6.8 6.3*  --  6.6*  --   --   --    ALBUMIN 2.6* 2.6*  --  2.6*  --   --   --    BILITOTAL 1.7* 1.6*  --  1.5*  --   --  3.8*   ALKPHOS 106 105  --  86  --   --   --    AST 57* 57*  --  53*  --   --   --    ALT 41 44  --  44  --   --   --     < > = values in this interval not displayed.     CBC  Recent Labs   Lab 05/05/21 0543 05/04/21  1900 05/04/21  1635 05/04/21 0450 05/03/21 0502 05/02/21  0736   WBC 13.2*  --   --  13.6* 15.8* 17.3*   RBC 2.75*  --   --  2.42* 2.43* 2.38*   HGB 7.8* 8.2* 7.0* 6.6* 6.9* 6.8*   HCT 23.9* 24.2* 21.9* 20.7* 21.1* 20.9*   MCV 87  --   --  86 87 88   MCH 28.4  --   --  27.3 28.4 28.6   MCHC 32.6  --   --  31.9 32.7 32.5   RDW 17.7*  --   --  22.9* 22.4* 21.1*     --   --  358 297 250     INRNo lab results found in last 7 days.    Medications list for reference:  Current Facility-Administered Medications   Medication     acetaminophen (TYLENOL) tablet 650 mg     acetaminophen (TYLENOL) tablet 650 mg     albuterol (PROAIR HFA/PROVENTIL HFA/VENTOLIN HFA) 108 (90 Base) MCG/ACT  inhaler 2 puff     albuterol (PROVENTIL) neb solution 2.5 mg     ARIPiprazole (ABILIFY) tablet 2 mg     ceFEPIme (MAXIPIME) 2 g vial to attach to  ml bag for ADULTS or 50 ml bag for PEDS     celecoxib (celeBREX) capsule 100 mg     deferasirox (JADENU) tablet 1,440 mg     diclofenac (VOLTAREN) 1 % topical gel 4 g     diphenhydrAMINE (BENADRYL) capsule 25 mg     enoxaparin ANTICOAGULANT (LOVENOX) injection 80 mg     fluticasone-vilanterol (BREO ELLIPTA) 200-25 MCG/INH inhaler 1 puff     heparin 100 UNIT/ML injection 5 mL     heparin 100 UNIT/ML injection 5 mL     hydroxyurea (HYDREA) capsule 2,000 mg     Lidocaine (LIDOCARE) 4 % Patch 1 patch    And     lidocaine patch in PLACE     lidocaine (LMX4) cream     lidocaine 1 % 0.1-1 mL     melatonin tablet 1 mg     morphine  PCA 1 mg/mL OPIOID TOLERANT     ondansetron (ZOFRAN) injection 4-8 mg     Patient is already receiving anticoagulation with heparin, enoxaparin (LOVENOX), warfarin (COUMADIN)  or other anticoagulant medication     polyethylene glycol (MIRALAX) Packet 17 g     senna-docusate (SENOKOT-S/PERICOLACE) 8.6-50 MG per tablet 1 tablet    Or     senna-docusate (SENOKOT-S/PERICOLACE) 8.6-50 MG per tablet 2 tablet     sertraline (ZOLOFT) tablet 200 mg     sodium chloride (PF) 0.9% PF flush 3 mL     sodium chloride (PF) 0.9% PF flush 3 mL     sodium chloride 0.9% infusion     5/3/21 CT chest:   IMPRESSION:   1. Diffuse groundglass and consolidative opacities throughout the  lungs, most pronounced in the dependent aspects of the lung fields.  Findings are suspicious for infection, including atypical pneumonia.  Less likely to represent cardiogenic pulmonary edema given the lack of  pleural effusion. Also consider acute chest syndrome in a patient with  sickle cell.  2. Walled off fluid collection in the superior left chest wall, in the  region of the chest wall port. Findings may represent a postprocedural  seroma, less likely an abscess.  3. Enlarged thoracic  and gastrohepatic lymph nodes. Findings may be  reactive in the setting of infection, or related to patient's  underlying sickle cell anemia.

## 2021-05-05 NOTE — PROGRESS NOTES
[facility]    Medicine Progress Note - Hospitalist Service, Gold 4       Date of Admission:  4/26/2021  Assessment & Plan        Jennifer Cervantes is a 20 yo F with PMHx of sickle cell anemia c/b CVA with residual RUE hemiparesis, and PE, iron overload 2/2 frequent transfusions, asthma, depression, and anxiety who presented with worsening pain, admitted on 4/26/2021 for acute sickle cell pain crisis, complicated by acute hypoxic respiratory failure likely due to acute chest syndrome vs community acquired pneumonia.     #Acute hypoxic respiratory failure, resolved   Patient initially requiring 2-3L NC, with significant increase in O2 requirement to 20L 50-60 FiO2 HFNC. Likely multifactorial. COVID negative on admission. Found to have worsening PE as noted below. CXR with RLL pneumonia, likely CAP, but also concern for acute chest s/p exchange transfusion with minimal improvement. TTE on 5/2 normal. Repeat CXR with increased diffuse bilateral pulmonary opacities with bilateral pleural effusions, possibly due to pulmonary edema, but minimal improvement with diuresis. CT chest w/o contrast with diffuse GGO and consolidated opacities concerning for infection. Procal repeated and rising to 0.44. Repeat COVID negative. RVP negative. Step and legionella urinary antigen negative. Differential broad with atypical/fungal infection. Hematology recommended repeat transfusion exchange with significant improvement in symptoms, now saturating normally on RA. Likely all from acute chest.   - Pulmonary consult, appreciate recs -   - Infectious disease consult, appreciate recs  - Hold further diuretics  - Discontinue Vancomycin with negative MRSA. Continue Cefepime for now (day 3)   - Anticoagulation with lovenox as noted below  - Follow up sputum cx if able (dry cough), Beta D glucan, Galactomannan, histo antigen urinary and serum, blasto antigen urine and serum, coccidioides serum and urine  antigen, HSV and VZV PCR blood, CMV PCR blood, Pneumocystis CPR and DFA from sputum   - Acapella and pulmonary toilet     #Acute sickle cell crisis   #Sepsis from Acute chest syndrome s/p apheresis transfusion 4/30/2021   HgbSS c/b frequent pain crisis, hx of CVA with RUE hemiparesis, iron overload, and DVT/PE. Presented with pain over right side on ribs and back due to sickle cell crisis. Treated with Morphine PCA.  Hospital course complicated increase O2 requirement and fevers, CXR with RLL concerning for pneumonia, with leukocytosis and fevers, meeting sepsis criteria. Lactic flat.  Concern for acute chest s/p exchange transfusion on 4/30. No fevers >72 hours. With persistent hypoxia concern about another acute process as noted above, but after repeat transfusion exchanged on 5/4, patient with significant improvement in respiratory status. Pain is uncontrolled today, with pain over back.   -Abx as noted above  -Hematology following, appreciate recs   -Pain management:    -Morphine PCA. Increase continuous rate 2.0 mg/hr, PCA dose 1.5 mg Q20 mins, (1 hour limit 7 mg)    -Celebrex 100 mg BID, Tylenol 650 mg Q6H PRN, voltaren gel QID PRN. Patient state she cant take toradol    -UA to evaluate for underlying pyelo with flank pain. Xray of thoracic spine   -Trend CBC and reticulocyte count  -Bowel regimen  -Benadryl PRN    -Continue hydroxyurea 2000 mg daily     #Worsening PE - Admitted on 2/1-2/3/2021 with a new PE and started on DOAC (initally on rivaroxaban and later switched to Eliquis after finding RUE DVT for possible rivaroxaban failure). Patient reports compliance with anticoagulation. On this admission, pt found to have bilateral subsegmental perfusion defects on VQ scan, worse compared to prior VQ scan on 2/1.   -Hold Eliquis, possibly failure due to poor absorption?   -Therapeutic dosing Lovenox 1 mg/kg (80 mg) Q12h dosing     #Hx of iron overload - Continue PTA Deferasirox 1440 mg daily     #Asthma -  Continue PTA Symbicort (formulary equivalent Breo Ellipta) and albuterol     #Anxiety  #Depression  - Continue PTA Sertraline 200 mg daily and Abilify 2 mg daily        Diet: Regular Diet Adult    DVT Prophylaxis: Enoxaparin (Lovenox) SQ  Lopez Catheter: not present  Code Status: Full Code           Disposition Plan   Expected discharge: 4 - 7 days, recommended to prior living arrangement once adequate pain management/ tolerating PO medications and O2 use less than 1 liters/minute.  Entered: Chhaya Vela PA-C 05/05/2021, 11:18 AM       The patient's care was discussed with the Attending Physician, Dr. Brooklyn Valle , Bedside Nurse, Care Coordinator/, Patient and Hematology  Consultant.    Chhaya Vela PA-C  Hospitalist Service, [address]  Contact information available via [facility] Paging/Directory  Please see sign in/sign out for up to date coverage information  ______________________________________________________________________    Interval History   Patient states her back pain and right sided flank pain is uncontrolled. Worse with taking in a deep breath. Feels that the oxygen makes her feel better. Denies any SOB, F/C, N/V/D, abdominal pain or dysuria/hematuria. Has not gotten out of bed due to pain. Offered PT when she is feeling better and pain better controlled but she declined.     Data reviewed today: I reviewed all medications, new labs and imaging results over the last 24 hours.     Physical Exam   Vital Signs: Temp: 98.5  F (36.9  C) Temp src: Oral BP: 109/52 Pulse: 107   Resp: 22 SpO2: 95 % O2 Device: Nasal cannula Oxygen Delivery: 1 LPM  Weight: 163 lbs 12.83 oz  GENERAL: Alert and oriented x 3. Lying in bed on R side. Appears uncomfortable.  Pleasant and cooperative.   HEENT: AT/NC. Anicteric sclera.   CARDIOVASCULAR: Tachycardic rate, regular rhythm. S1, S2. No murmurs, rubs, or gallops.   RESPIRATORY: Effort normal on RA.  Rales in R lung and LLL. No wheezing or rhonchi. respirations unlabored   GI: Abdomen soft, non-tender abdomen without rebound or guarding, hypoactive bowel sounds present  EXTREMITIES: No peripheral edema. No calf asymmetry, erythema, or tenderness.   NEUROLOGICAL: Moving all extremities symmetrically.   SKIN: Intact. Warm and dry. No jaundice.     Data   Recent Labs   Lab 05/05/21  0543 05/04/21  1900 05/04/21  1635 05/04/21  0450 05/03/21  1936 05/03/21  0502 04/29/21  0344 04/29/21  0344 04/28/21  1850   WBC 13.2*  --   --  13.6*  --  15.8*   < > 19.1*  --    HGB 7.8* 8.2* 7.0* 6.6*  --  6.9*   < > 7.2*  --    MCV 87  --   --  86  --  87   < > 89  --      --   --  358  --  297   < > 298  --      --   --  140  --  140   < > 134  --    POTASSIUM 4.0  --   --  4.1 3.2* 3.2*   < > 3.8  --    CHLORIDE 104  --   --  106  --  104   < > 101  --    CO2 26  --   --  29  --  28   < > 26  --    BUN 4*  --   --  5*  --  4*   < > 7  --    CR 0.47*  --   --  0.61  --  0.55   < > 0.55  --    ANIONGAP 7  --   --  4  --  8   < > 6  --    MICAH 8.8  --   --  8.5  --  8.3*   < > 8.7  --    GLC 87  --   --  80  --  96   < > 102*  --    ALBUMIN 2.6*  --   --  2.6*  --  2.6*   < >  --   --    PROTTOTAL 6.8  --   --  6.3*  --  6.6*   < >  --   --    BILITOTAL 1.7*  --   --  1.6*  --  1.5*   < >  --   --    ALKPHOS 106  --   --  105  --  86   < >  --   --    ALT 41  --   --  44  --  44   < >  --   --    AST 57*  --   --  57*  --  53*   < >  --   --    TROPI  --   --   --   --   --   --   --  <0.015 <0.015    < > = values in this interval not displayed.     No results found for this or any previous visit (from the past 24 hour(s)).  Medications     morphine       - MEDICATION INSTRUCTIONS -       sodium chloride 10 mL/hr at 05/02/21 1500       acetaminophen  650 mg Oral Q4H     albuterol  2.5 mg Nebulization 4x Daily     ARIPiprazole  2 mg Oral Daily     ceFEPIme (MAXIPIME) IV  2 g Intravenous Q8H     celecoxib  100 mg Oral  BID     deferasirox  1,440 mg Oral QPM     enoxaparin ANTICOAGULANT  1 mg/kg Subcutaneous Q12H     fluticasone-vilanterol  1 puff Inhalation QPM     heparin  5 mL Intracatheter Q24H     heparin  5 mL Intracatheter Q24H     hydroxyurea  2,000 mg Oral Daily     lidocaine  1 patch Transdermal Q24h    And     lidocaine   Transdermal Q8H     polyethylene glycol  17 g Oral Daily     senna-docusate  1 tablet Oral BID    Or     senna-docusate  2 tablet Oral BID     sertraline  200 mg Oral Daily     sodium chloride (PF)  3 mL Intracatheter Q8H

## 2021-05-05 NOTE — PLAN OF CARE
Neuro: A&Ox4. Pleasant, more open tonight. Residual right sided weakness from previous CVA.   Cardiac: SR-ST rates 80s-100s. VSS.        Respiratory: 1L NC. ORTEGA, Tachypneic 20s. LS coarse/crackles throughout.  GI/: Adequate urine output; jesica.  BM 5/4  Diet/appetite: Regular diet. Poor appetite.  Activity:  SBA, up to commode, ambulated in halls.  Pain: At acceptable level on current regimen. Morphine PCA 1.5mg cont/1.5mg bumps q20min 7mg lockout.  Skin: No new deficits noted.  LDA's: R PIV x2; morphine pca and tko.     Plan: IR consulted to remove groin line.Continue with POC. Notify primary team with changes

## 2021-05-05 NOTE — PROGRESS NOTES
Transfer  Transferred to: 5B   Via:bed  Reason for transfer:Pt no longer appropriate for 6B- improved/worsened patient condition  Family: Aware of transfer  Belongings: Packed and sent with pt  Chart: Delivered with pt to next unit  Medications: Meds sent to new unit with pt  Report given   Pt status: off unit

## 2021-05-05 NOTE — PROGRESS NOTES
[facility]    Pulmonary, Allergy, Critical Care and Sleep Medicine    Follow-up Note  05/05/2021    Assessment and Recommendations:    Jennifer Cervantes is a 21 year old female with a history of sickle cell anemia recurrent bilateral PE on chronic anticoagulation, CVA with residual RUE hemiparesis, iron overload secondary to frequent transfusions, asthma, depression and anxiety who was admitted on 4/26/2021 with acute hypoxemic respiratory failure secondary to acute chest syndrome.    # Acute hypoxemic respiratory failure  # Groundglass and consolidative opacities on CT  # Sickle cell anemia with acute sickle cell crisis  # Acute chest syndrome status post exchange transfusion 4/30 and 5/4  Marked improvement overnight with oxygenation (now on RA) following exchange transfusion #2 last evening. Decompensation likely all in the setting of acute chest syndrome. Given improvement, no indication for bronchoscopy nor steroid dosing at this time. Defer to ID re: length of abx course.     Pulmonary to sign off, please call with questions.     Jitendra Meza MD  Pulmonary & Critical Care Medicine  [facility]   Pager: [phone]     Subjective, Interval history:     Status post exchange transfusion #2 last evening with significant improvement in O2 needs. Feels breathing is better.      Objective:   ROS:   C: negative for fever, chills, change in weight, change in appetite  INTEGUMENTARY/SKIN: no rash or obvious new lesions  ENT/MOUTH: no sore throat, no sinus pain, no nasal drainage  RESP: see interval history  CV: no chest pain, no palpitations, no peripheral edema, no orthopnea  GI: no nausea, no vomiting, no reflux symptoms, no change in stools  : no dysuria  MUSCULOSKELETAL: no myalgias, no arthralgias  ENDOCRINE: blood sugars with adequate control  NEURO: no headache, no numbness or tingling  PSYCHIATRIC: mood stable    Medications:    acetaminophen  650 mg Oral Q4H     albuterol   2.5 mg Nebulization 4x Daily     ARIPiprazole  2 mg Oral Daily     ceFEPIme (MAXIPIME) IV  2 g Intravenous Q8H     celecoxib  100 mg Oral BID     deferasirox  1,440 mg Oral QPM     enoxaparin ANTICOAGULANT  1 mg/kg Subcutaneous Q12H     fluticasone-vilanterol  1 puff Inhalation QPM     heparin  5 mL Intracatheter Q24H     heparin  5 mL Intracatheter Q24H     hydroxyurea  2,000 mg Oral Daily     lidocaine  1 patch Transdermal Q24h    And     lidocaine   Transdermal Q8H     polyethylene glycol  17 g Oral Daily     senna-docusate  1 tablet Oral BID    Or     senna-docusate  2 tablet Oral BID     sertraline  200 mg Oral Daily     sodium chloride (PF)  3 mL Intracatheter Q8H     acetaminophen, albuterol, diclofenac, diphenhydrAMINE, lidocaine 4%, lidocaine (buffered or not buffered), melatonin, ondansetron, - MEDICATION INSTRUCTIONS -, sodium chloride (PF)    Physical Exam:  Temp:  [97  F (36.1  C)-98.8  F (37.1  C)] 98.5  F (36.9  C)  Pulse:  [] 107  Resp:  [22-33] 22  BP: (109-135)/(52-83) 109/52  FiO2 (%):  [35 %-60 %] 35 %  SpO2:  [94 %-99 %] 95 %    Intake/Output Summary (Last 24 hours) at 5/5/2021 1225  Last data filed at 5/5/2021 1025  Gross per 24 hour   Intake 1167 ml   Output 2250 ml   Net -1083 ml       General: laying in bed in no apparent distress  HEENT: anicteric, moist mucosa  Neck: no palpable lymphadenopathy, no JVD noted  Chest: CTAB, no wheezing, rhocnhi or rales  Cardiac: RRR no murmurs  Abdomen: Soft, flat, non tender, active BS  Extremities: no lower extremity edema  Neuro: alert and oriented x3, no focal deficits   Skin: no rash noted    Labs and imaging: All laboratory and imaging data personally reviewed.

## 2021-05-05 NOTE — PLAN OF CARE
Neuro: A&Ox4, withdrawn and flat, needs lots of motivation  Cardiac: SR-ST. BP (!) 143/78 (BP Location: Left arm)   Pulse 114   Temp 98.8  F (37.1  C) (Oral)   Resp 18   Wt 74.3 kg (163 lb 12.8 oz)   SpO2 91%   BMI 28.12 kg/m    Respiratory: Sating  on Room air to 2L   GI/: Adequate urine output. No BM over shift, pt does not complain of constipation and refused bowel medications   Diet/appetite: Tolerating Reg diet. Did not eat anything over shift  Activity:  Assist of x1 up to cart and sitting at the edge of the bed   Pain: Morphine PCA for back pain from sickle cell  Skin: Bruising   LDA's: PIVx2 and femoral CVC     Plan: Transfer to  after spine xray.

## 2021-05-05 NOTE — PROGRESS NOTES
GENERAL ID SERVICE: PROGRESS NOTE  Patient:  Jennifer Cervantes, Date of birth 2000, Medical record number [id number]  Date of Admission: 4/26/2021  Date of Visit:  5/5/2021         Assessment and Recommendations:   Problem List:    # Sicke cell crisis with progressive lung infiltrates and CT chest from 5/3/21 showing bilateral GGO     # Port cath in place since 4/21/21 - CT from 5/3/21 also showing walled off fluid collection in the superior left chest wall port however more suggestive of seroma (per CT report) and blood cultures are all negative     # Worsening PE on anticoagulation     Discussion:     Ms. Jennifer Cervantes is a 21 year old female with PMHx of of sickle cell anemia, CVA with residual RUE hemiparesis, History of PE (was on xarelto in February 2021and then switched to eliquis on 3/25/21)  iron overload 2/2 frequent transfusions, asthma, depression and anxiety who presented to the [facility] ER with worsening pain concerning for an acute sickle cell pain crisis.  Patient has a several day history of worsening right sided pain concerning for sickle cell pain crises.  Unrelieved by home pain regimen. She endorsed pain primarily on her right side, mostly over her ribs and back. She was seen in the ED on  the day before her current presentation where she received some relief but was also seen again today by her hematology provider. She denies SOB as she was found to be acutely hypoxic in the clinic on the day of the admission. Upon arrival the patient febrile and hd white count of 12.9. Lactic acid was normal, procacitonin was 0.13 and CRP was 32. Blood cultures were obtained and are negative to date. COVID test was negative. CXR showed Increased right lower lung opacity suggesting infection, atelectasis or infarction. Patient was started on ceftriaxone and azithromycin for CAP coverage. In addition NM lung scan showed bilateral subsegmental perfusion defects, left greater than right, consistent with  "pulmonary emboli. Overall findings have worsened since the comparison perfusion scan on 2/1/2021, particularly within the left lung. Her eliquis was switched to lovenox. Hematology is following and the patient is also on deferasirox, hydroxyurea as well as pain regimen.      Patient continued to have white count elevation and fever from 4/28 to 4/30 and she underwent RBC exchange per hematology recommendation on 4/30. Patient is afebrile since 5/1. Her white count was still elevated on 5/2.  CT chest performed on 5/3/21 showed  \"Diffuse groundglass and consolidative opacities throughout the lungs, most pronounced in the dependent aspects of the lung fields.Findings are suspicious for infection, including atypical and viral infections such as COVID-19 pneumonia. Less likely to represent cardiogenic pulmonary edema given the lack of pleural effusion\". In addition the chest CT from 5/3 showed walled off fluid collection in the superior left chest wall port however more suggestive of seroma (per CT report). Blood cultures from 4/28, 4/29 are negative to date. Primary team consulted pulmonology and they are on board. COVID test was repeated and was negative. Antibiotics were broadened to cefepime and vancomycin on 5/2. MRSA swab was negative so vancomycin was stopped on 5/4. Patient's fever curve is improving and WBC is still elevated at 13, however trending down. Her O2 requirements decreased and she is now on 2L/min by NC. Work up performed so far had negative blood cultures and negative BDG, Gallactomannan and Cryptococcus antigen (see \"interval history\"). Given negative BDG Pseumocystis becomes unlikely and well as other fungal etiologies, but the pending fungal antigens and antibodies will nee d to be followed.  Given parameters of clinical improvement with broadened antibacterial coverage, I recommend to complete 7 days of Cefepime.      Recommendations:     1. Please continue cefepime 2 grams IV q 8h. Recommend 7 " "days of treatment for presumable HCAP. Anticipated end date of treatment: 5/11/21 after the last dose of that day    2. Given parameters of clinical improvement, I will sign off for now, but please continue to follow the pending labs below (see the list in \"interval history\" session - below). Please call us back if any of these labs come back positive. Please call us back if any change in clinical status    3. Although the CT chest from 5/3 also showed howed walled off fluid collection in the superior left chest wall port, this was more suggestive of seroma (per CT report). No signs of inflammation around the port and blood cultures are all negative so far. I would recommend to obtain an US of the area one week after the previous CT (on 5/10) to assure that it is not an evolving process    4. I will sign off for now, please call us back if any need (see above)        Pham Massey MD  Date of Service: 05/05/21  Pager: [phone]          Interval History:   ID work up so far:     Resulted tests:  - Resp panel (5/3): negative  - COVID test: negative  - Strep pneumo and legionella ur antigen: negative  - MRSA negative  - Aspergillus Galactomannan: negative  - Beta D glucan: negative  - Cryptococcus serum antigen: negative  - HSV PCR from serum: negative    Pending tests:  - Sputum culture - needs to be collected  - Blastomyces urine and serum antigen  - Histoplasma urine and serum antigen  - Coccidioides urine and serum antigen  - Fungal antibody panel  - CMV PCR from serum  - VZV PCR from serum  - Pneumocystis PCR and DFA from sputum: needs to be collected  - Blood culture (4/29): negative but still in process            Physical Exam:   /63 (BP Location: Left arm)   Pulse 118   Temp 99.5  F (37.5  C) (Oral)   Resp 20   Wt 75.4 kg (166 lb 4.8 oz)   SpO2 99%   BMI 28.55 kg/m         Exam:  GENERAL:  Well-developed, well-nourished, not in acute distress.   HEAD: Normocephalic and atraumatic  ENT:  No hearing " impairment, no ear pain or exudate, ear canals and TM's normal, nose and mouth without ulcers or lesions, oropharynx clear, oral mucous membranes moist, no tonsillar erythema and edema, oropharynx is without exudates or ulcers.  EYES:  Eyes grossly normal to inspection, PERRL and conjunctivae and sclerae normal   NECK:  Supple, no adenopathy, no asymmetry, masses, or scars and thyroid normal to palpation  LUNGS:  Clear to auscultation - no rales, rhonchi or wheezes  CARDIOVASCULAR:  Regular rate and rhythm, normal S1 S2, no S3 or S4, no murmur, click or rub, no peripheral edema and peripheral pulses strong  ABDOMEN:  Soft, nontender, no hepatosplenomegaly, no masses and bowel sounds normal  EXT: Extremities warm and without edema.  MS: No gross musculoskeletal defects noted, no edema  SKIN:  No acute rashes or suspicious lesions  NEUROLOGIC:  Grossly nonfocal. Normal strength and tone, mentation intact and speech normal  PSYCHIATRIC: Mood stable, mentation appears normal, affect normal  HEMATOLOGIC/LYMPHATIC: No lymphadenopathy or bleeding           Laboratory Data:     Creatinine   Date Value Ref Range Status   05/05/2021 0.47 (L) 0.52 - 1.04 mg/dL Final   05/04/2021 0.61 0.52 - 1.04 mg/dL Final   05/03/2021 0.55 0.52 - 1.04 mg/dL Final   05/02/2021 0.51 (L) 0.52 - 1.04 mg/dL Final   05/01/2021 0.52 0.52 - 1.04 mg/dL Final     WBC   Date Value Ref Range Status   05/05/2021 13.2 (H) 4.0 - 11.0 10e9/L Final   05/04/2021 13.6 (H) 4.0 - 11.0 10e9/L Final   05/03/2021 15.8 (H) 4.0 - 11.0 10e9/L Final   05/02/2021 17.3 (H) 4.0 - 11.0 10e9/L Final   05/01/2021 14.4 (H) 4.0 - 11.0 10e9/L Final     Hemoglobin   Date Value Ref Range Status   05/05/2021 7.8 (L) 11.7 - 15.7 g/dL Final     Platelet Count   Date Value Ref Range Status   05/05/2021 253 150 - 450 10e9/L Final     Lab Results   Component Value Date     05/05/2021    BUN 4 (L) 05/05/2021    CO2 26 05/05/2021     CRP Inflammation   Date Value Ref Range Status    05/05/2021 110.0 (H) 0.0 - 8.0 mg/L Final   04/28/2021 32.0 (H) 0.0 - 8.0 mg/L Final   04/03/2021 8.8 (H) 0.0 - 8.0 mg/L Final   03/04/2021 6.8 0.0 - 8.0 mg/L Final   03/29/2020 21.0 (H) 0.0 - 8.0 mg/L Final           Pertinent Recent Microbiology Data:     Recent Labs   Lab 05/04/21  0450 05/03/21  1713 05/03/21  1430 04/29/21  2205 04/29/21  2049 04/28/21  1858 04/28/21  1847   CULT  --   --   --  No growth No growth No growth No growth   SDES Serum Nares Urine  Unspecified Urine Blood Right Hand Blood Left Hand Blood Right Hand Blood Left Arm            Imaging:     Recent Results (from the past 48 hour(s))   XR Thoracic Spine 2 Views    Narrative    Exam: XR THORACIC SPINE 2 VW, 5/5/2021 1:34 PM    Indication: Thoracic back pain    Comparison: CT chest 5/3/2021.    Findings:   AP, lateral, and swimmer's view of the thoracic spine.    Left IJ Port-A-Cath tip projects over the right atrium.    No fracture or subluxation of the thoracic spine. No aggressive  osseous lesion.    Postsurgical changes of cholecystectomy. Patchy hazy opacities  throughout the lungs and a retrocardiac opacity with air bronchograms  in the lower lobes      Impression    Impression:   1. No acute osseous abnormality.  2. Multifocal opacities in the lungs, in particular retrocardiac,  which are suspicious for infection.    FREDY DACOSTA MD

## 2021-05-06 ENCOUNTER — APPOINTMENT (OUTPATIENT)
Dept: INTERVENTIONAL RADIOLOGY/VASCULAR | Facility: CLINIC | Age: 21
DRG: 811 | End: 2021-05-06
Attending: PHYSICIAN ASSISTANT
Payer: COMMERCIAL

## 2021-05-06 LAB
ALBUMIN SERPL-MCNC: 2.7 G/DL (ref 3.4–5)
ALP SERPL-CCNC: 106 U/L (ref 40–150)
ALT SERPL W P-5'-P-CCNC: 35 U/L (ref 0–50)
ANION GAP SERPL CALCULATED.3IONS-SCNC: 6 MMOL/L (ref 3–14)
AST SERPL W P-5'-P-CCNC: 46 U/L (ref 0–45)
BILIRUB SERPL-MCNC: 1.3 MG/DL (ref 0.2–1.3)
BUN SERPL-MCNC: 3 MG/DL (ref 7–30)
CALCIUM SERPL-MCNC: 9 MG/DL (ref 8.5–10.1)
CHLORIDE SERPL-SCNC: 102 MMOL/L (ref 94–109)
CO2 SERPL-SCNC: 26 MMOL/L (ref 20–32)
CREAT SERPL-MCNC: 0.43 MG/DL (ref 0.52–1.04)
ERYTHROCYTE [DISTWIDTH] IN BLOOD BY AUTOMATED COUNT: 19.3 % (ref 10–15)
GFR SERPL CREATININE-BSD FRML MDRD: >90 ML/MIN/{1.73_M2}
GLUCOSE SERPL-MCNC: 93 MG/DL (ref 70–99)
HCT VFR BLD AUTO: 24.3 % (ref 35–47)
HGB BLD-MCNC: 7.8 G/DL (ref 11.7–15.7)
LAB SCANNED RESULT: NORMAL
LACTATE BLD-SCNC: 0.4 MMOL/L (ref 0.7–2)
MCH RBC QN AUTO: 28.2 PG (ref 26.5–33)
MCHC RBC AUTO-ENTMCNC: 32.1 G/DL (ref 31.5–36.5)
MCV RBC AUTO: 88 FL (ref 78–100)
PLATELET # BLD AUTO: 369 10E9/L (ref 150–450)
POTASSIUM SERPL-SCNC: 3.6 MMOL/L (ref 3.4–5.3)
PROT SERPL-MCNC: 7.4 G/DL (ref 6.8–8.8)
RBC # BLD AUTO: 2.77 10E12/L (ref 3.8–5.2)
RETICS # AUTO: 282.5 10E9/L (ref 25–95)
RETICS/RBC NFR AUTO: 10.2 % (ref 0.5–2)
SODIUM SERPL-SCNC: 135 MMOL/L (ref 133–144)
WBC # BLD AUTO: 13.7 10E9/L (ref 4–11)

## 2021-05-06 PROCEDURE — 250N000011 HC RX IP 250 OP 636: Performed by: PHYSICIAN ASSISTANT

## 2021-05-06 PROCEDURE — 99207 PR APP CREDIT; MD BILLING SHARED VISIT: CPT | Performed by: PHYSICIAN ASSISTANT

## 2021-05-06 PROCEDURE — 99233 SBSQ HOSP IP/OBS HIGH 50: CPT | Mod: GC | Performed by: PEDIATRICS

## 2021-05-06 PROCEDURE — 36415 COLL VENOUS BLD VENIPUNCTURE: CPT | Performed by: PHYSICIAN ASSISTANT

## 2021-05-06 PROCEDURE — 85045 AUTOMATED RETICULOCYTE COUNT: CPT | Performed by: PHYSICIAN ASSISTANT

## 2021-05-06 PROCEDURE — 94640 AIRWAY INHALATION TREATMENT: CPT

## 2021-05-06 PROCEDURE — 999N000157 HC STATISTIC RCP TIME EA 10 MIN

## 2021-05-06 PROCEDURE — 83605 ASSAY OF LACTIC ACID: CPT | Performed by: PHYSICIAN ASSISTANT

## 2021-05-06 PROCEDURE — 250N000013 HC RX MED GY IP 250 OP 250 PS 637: Performed by: PEDIATRICS

## 2021-05-06 PROCEDURE — 258N000003 HC RX IP 258 OP 636: Performed by: PHYSICIAN ASSISTANT

## 2021-05-06 PROCEDURE — 120N000002 HC R&B MED SURG/OB UMMC

## 2021-05-06 PROCEDURE — 99231 SBSQ HOSP IP/OBS SF/LOW 25: CPT | Performed by: PHYSICIAN ASSISTANT

## 2021-05-06 PROCEDURE — 85027 COMPLETE CBC AUTOMATED: CPT | Performed by: PHYSICIAN ASSISTANT

## 2021-05-06 PROCEDURE — 250N000013 HC RX MED GY IP 250 OP 250 PS 637: Performed by: PHYSICIAN ASSISTANT

## 2021-05-06 PROCEDURE — 02PYX3Z REMOVAL OF INFUSION DEVICE FROM GREAT VESSEL, EXTERNAL APPROACH: ICD-10-PCS | Performed by: PHYSICIAN ASSISTANT

## 2021-05-06 PROCEDURE — 250N000009 HC RX 250: Performed by: PHYSICIAN ASSISTANT

## 2021-05-06 PROCEDURE — 80053 COMPREHEN METABOLIC PANEL: CPT | Performed by: PHYSICIAN ASSISTANT

## 2021-05-06 PROCEDURE — 99233 SBSQ HOSP IP/OBS HIGH 50: CPT | Performed by: INTERNAL MEDICINE

## 2021-05-06 RX ADMIN — CEFEPIME HYDROCHLORIDE 2 G: 2 INJECTION, POWDER, FOR SOLUTION INTRAVENOUS at 11:38

## 2021-05-06 RX ADMIN — ACETAMINOPHEN 650 MG: 325 TABLET, FILM COATED ORAL at 20:26

## 2021-05-06 RX ADMIN — FLUTICASONE FUROATE AND VILANTEROL TRIFENATATE 1 PUFF: 200; 25 POWDER RESPIRATORY (INHALATION) at 20:26

## 2021-05-06 RX ADMIN — CEFEPIME HYDROCHLORIDE 2 G: 2 INJECTION, POWDER, FOR SOLUTION INTRAVENOUS at 19:00

## 2021-05-06 RX ADMIN — Medication: at 11:57

## 2021-05-06 RX ADMIN — ENOXAPARIN SODIUM 80 MG: 80 INJECTION SUBCUTANEOUS at 16:30

## 2021-05-06 RX ADMIN — CEFEPIME HYDROCHLORIDE 2 G: 2 INJECTION, POWDER, FOR SOLUTION INTRAVENOUS at 03:22

## 2021-05-06 RX ADMIN — ALBUTEROL SULFATE 2.5 MG: 2.5 SOLUTION RESPIRATORY (INHALATION) at 12:49

## 2021-05-06 RX ADMIN — HYDROXYUREA 2000 MG: 500 CAPSULE ORAL at 11:38

## 2021-05-06 RX ADMIN — Medication: at 13:01

## 2021-05-06 RX ADMIN — SERTRALINE HYDROCHLORIDE 200 MG: 100 TABLET ORAL at 11:38

## 2021-05-06 RX ADMIN — SODIUM CHLORIDE: 9 INJECTION, SOLUTION INTRAVENOUS at 11:57

## 2021-05-06 RX ADMIN — ACETAMINOPHEN 650 MG: 325 TABLET, FILM COATED ORAL at 11:37

## 2021-05-06 RX ADMIN — ARIPIPRAZOLE 2 MG: 2 TABLET ORAL at 11:38

## 2021-05-06 RX ADMIN — ENOXAPARIN SODIUM 80 MG: 80 INJECTION SUBCUTANEOUS at 03:23

## 2021-05-06 RX ADMIN — CELECOXIB 100 MG: 100 CAPSULE ORAL at 11:38

## 2021-05-06 ASSESSMENT — ACTIVITIES OF DAILY LIVING (ADL)
ADLS_ACUITY_SCORE: 15

## 2021-05-06 NOTE — PLAN OF CARE
Shift: [id number]  Neuro: A&O x4, able to make needs known. Slept most of shift, refused most of scheduled meds.   Respiratory: Sats well on 1L NC, denies SOB.   Cardiac: WDL x tachycardic, denies cardiac chest pain/symtpoms.   GI/: Voids spontaneously, no BM this shift, +BS.   Diet: regular  Pain/Nausea: pain managed w/ PCA morphine. Denies nausea.   IV Access: PIVs infusing PCA and IVMF.   Activity: SBA.   Plan: Continue to monitor, follow POC.

## 2021-05-06 NOTE — PLAN OF CARE
Pt is AO*4, lethargic. Pt is on 1L of O2, satting well >97%. Denies nausea. Complains of generalized pain on PCA morphine. NS running at 75ml.hr. Pt is SBA. Linen change competed due to pt being incontinent x1. No BM overnight. Follow plan of care.     Isaura Lindsay RN on 5/6/2021 at 7:57 AM

## 2021-05-06 NOTE — PROCEDURES
Interventional Radiology Brief Post Procedure Note  05/06/21    Procedure: IR Non Tunneled Line Removal-LEFT Groin  Proceduralist: Michelle Johnson PA-C   Time Out: Prior to the start of the procedure and with procedural staff participation, I verbally confirmed the patient s identity using two indicators, relevant allergies, that the procedure was appropriate and matched the consent or emergent situation, and that the correct equipment/implants were available. Immediately prior to starting the procedure I conducted the Time Out with the procedural staff and re-confirmed the patient s name, procedure, and site/side. (The Joint Commission universal protocol was followed.)  Yes    Sedation: None.     Findings: Completed removal of left groin non tunneled catheter at bedside    Comments: See dictated procedure note for full details.    Michelle Johnson PA-C

## 2021-05-06 NOTE — PROGRESS NOTES
[facility]    Medicine Progress Note - Hospitalist Service, Gold 4       Date of Admission:  4/26/2021  Assessment & Plan        Jennifer Cervantes is a 20 yo F with PMHx of sickle cell anemia c/b CVA with residual RUE hemiparesis, and PE, iron overload 2/2 frequent transfusions, asthma, depression, and anxiety who presented with worsening pain, admitted on 4/26/2021 for acute sickle cell pain crisis, complicated by acute hypoxic respiratory failure likely due to acute chest syndrome vs community acquired pneumonia.    #Acute sickle cell crisis   #Sepsis from Acute chest syndrome, resolved   #Acute respiratory failure, resolved    HgbSS c/b frequent pain crisis, hx of CVA with RUE hemiparesis, iron overload, and DVT/PE. Presented with pain over right side on ribs and back due to sickle cell crisis. Treated with Morphine PCA.  Hospital course complicated increase O2 requirement, from 2-3L initially up to HFNC, and fevers, CXR with RLL concerning for pneumonia, with leukocytosis and fevers, meeting sepsis criteria. Started on Ceftriaxone/Azithromycin. Lactic flat. COVID negative x2. TTE normal. Concern for acute chest s/p exchange transfusion on 4/30 with minimal improvement in oxygen requirements, but did improve fever curve.  With persistent hypoxia concern about another acute inflammatory or infectious process. Procalcitonin rising to 0.44. CT chest w/o contrast with diffuse GGO and consolidated opacities concerning for infection.  ID and pulmonary consulted with extensive infectious work up with negative RVP, strep pneumo and legionella urinary antigen, histo serum and urinary antigen, cryptococcus serum, Beta D glucan, galactomannan, HSV 1&2. Remaining studies still pending below.  Broadened antibiotics to Cefepime and Vanco (Vanco now discontinued after negative MRSA swab). Hematology recommended repeat transfusion exchanged on 5/4, patient with significant improvement in  respiratory status, now on RA. Patient now continues to have back pain, consistent with prior SSC. UA negative for infection. Thoracic spine without acute bony process.     -Appreciated ID and pulmonary recs who have since signed off  -Continue Cefepime 2g Q8H for 7 day course  (End date 5/11/2021)  -Hematology following, appreciate recs   -Pain management:    -Morphine PCA. Increase continuous rate 2.0 mg/hr, PCA dose 1.7 mg Q20 mins, (1 hour limit 7.1 mg)    -Celebrex 100 mg BID, Tylenol 650 mg Q6H PRN, voltaren gel QID PRN. Patient state she cant take toradol    -Trend CBC and reticulocyte count  -Bowel regimen  -Benadryl PRN    -Continue hydroxyurea 2000 mg daily   -Follow up sputum cx if able (dry cough), histo antigen serum, blasto antigen urine and serum, coccidioides serum and urine antigen, VZV PCR blood, CMV PCR blood, Fungal abPneumocystis CPR and DFA from sputum (not yet collect)    -Acapella and pulmonary toilet   -Encourage ambulation   -Removed femoral CVC catheter today     #Worsening PE - Admitted on 2/1-2/3/2021 with a new PE and started on DOAC (initally on rivaroxaban and later switched to Eliquis after finding RUE DVT for possible rivaroxaban failure). Patient reports compliance with anticoagulation. On this admission, pt found to have bilateral subsegmental perfusion defects on VQ scan, worse compared to prior VQ scan on 2/1.   -Hold Eliquis, possibly failure due to poor absorption?   -Therapeutic dosing Lovenox 1 mg/kg (80 mg) Q12h dosing     #Seroma? Although the CT chest from 5/3 also showed howed walled off fluid collection in the superior left chest wall port, this was more suggestive of seroma (per CT report). No signs of inflammation around the port and blood cultures are all negative so far.   -Recommend repeat US of the area one week after the previous CT (on 5/10) to assure that it is not an evolving process    #Hx of iron overload - Continue PTA Deferasirox 1440 mg daily     #Asthma  - Continue PTA Symbicort (formulary equivalent Breo Ellipta) and albuterol     #Anxiety  #Depression  - Continue PTA Sertraline 200 mg daily and Abilify 2 mg daily          Diet: Regular Diet Adult    DVT Prophylaxis: Enoxaparin (Lovenox) SQ  Lopez Catheter: not present  Code Status: Full Code           Disposition Plan   Expected discharge: 4 - 7 days, recommended to prior living arrangement once adequate pain management/ tolerating PO medications.  Entered: Chhaya Vela PA-C 05/06/2021, 9:05 AM       The patient's care was discussed with the Attending Physician, Dr. Ginna Valle, Bedside Nurse, Patient and Hematology Consultant.    Chhaya Vela PA-C  Hospitalist Service, [address]  Contact information available via [facility] Paging/Directory  Please see sign in/sign out for up to date coverage information  ______________________________________________________________________    Interval History   Patient states she is still having pain in her back. States the morphine PCA is helping. Discussed starting toradol, but she is concerned about the increased bleeding risk on her anticoagulation. States she is using the volaren gel more frequently which has been helping. Denies any SOB, N/V, abdominal pain. Encouraged patient to move around and she states she will try to walk around today. Nursing yesterday report finding several blue pills in bedside drawer, which looked like Jadenu medications. Patient states she has been taking her medications and states those must have been her old pills. Asked is she was having any side effects or concerns about the medications and she denied any.     Data reviewed today: I reviewed all medications, new labs and imaging results over the last 24 hours.     Physical Exam   Vital Signs: Temp: 98.8  F (37.1  C) Temp src: Oral BP: 112/48 Pulse: 82   Resp: 18 SpO2: 100 % O2 Device: Nasal cannula Oxygen Delivery: 3  LPM  Weight: 166 lbs 4.8 oz  GENERAL: Alert and awake. Appears comfortable lying on R side. Pleasant and conversational  HEENT: AT/NC. Anicteric sclera.  CARDIOVASCULAR: RRR. S1, S2. No murmurs, rubs, or gallops.   RESPIRATORY: Effort normal on RA. Rales in R lung. No rhonchi or wheezing.  respirations unlabored   GI: Abdomen soft, non-tender abdomen without rebound or guarding, hypoactive bowel sounds present  EXTREMITIES: No peripheral edema. No calf asymmetry, erythema, or tenderness.   NEUROLOGICAL: CN II-XII grossly intact. Moving all extremities symmetrically.   SKIN: Intact. Warm and dry.   No jaundice.     Data   Recent Labs   Lab 05/06/21  0723 05/05/21  0543 05/04/21  1900 05/04/21  0450 05/04/21  0450   WBC 13.7* 13.2*  --   --  13.6*   HGB 7.8* 7.8* 8.2*   < > 6.6*   MCV 88 87  --   --  86    253  --   --  358    137  --   --  140   POTASSIUM 3.6 4.0  --   --  4.1   CHLORIDE 102 104  --   --  106   CO2 26 26  --   --  29   BUN 3* 4*  --   --  5*   CR 0.43* 0.47*  --   --  0.61   ANIONGAP 6 7  --   --  4   MICAH 9.0 8.8  --   --  8.5   GLC 93 87  --   --  80   ALBUMIN 2.7* 2.6*  --   --  2.6*   PROTTOTAL 7.4 6.8  --   --  6.3*   BILITOTAL 1.3 1.7*  --   --  1.6*   ALKPHOS 106 106  --   --  105   ALT 35 41  --   --  44   AST 46* 57*  --   --  57*    < > = values in this interval not displayed.     Recent Results (from the past 24 hour(s))   XR Thoracic Spine 2 Views    Narrative    Exam: XR THORACIC SPINE 2 VW, 5/5/2021 1:34 PM    Indication: Thoracic back pain    Comparison: CT chest 5/3/2021.    Findings:   AP, lateral, and swimmer's view of the thoracic spine.    Left IJ Port-A-Cath tip projects over the right atrium.    No fracture or subluxation of the thoracic spine. No aggressive  osseous lesion.    Postsurgical changes of cholecystectomy. Patchy hazy opacities  throughout the lungs and a retrocardiac opacity with air bronchograms  in the lower lobes      Impression    Impression:   1. No  acute osseous abnormality.  2. Multifocal opacities in the lungs, in particular retrocardiac,  which are suspicious for infection.    FREDY DACOSTA MD     Medications     morphine       - MEDICATION INSTRUCTIONS -       sodium chloride 75 mL/hr at 05/05/21 1145     sodium chloride 10 mL/hr at 05/02/21 1500       acetaminophen  650 mg Oral Q4H     albuterol  2.5 mg Nebulization 4x Daily     ARIPiprazole  2 mg Oral Daily     ceFEPIme (MAXIPIME) IV  2 g Intravenous Q8H     celecoxib  100 mg Oral BID     deferasirox  1,440 mg Oral QPM     enoxaparin ANTICOAGULANT  1 mg/kg Subcutaneous Q12H     fluticasone-vilanterol  1 puff Inhalation QPM     heparin  5 mL Intracatheter Q24H     heparin  5 mL Intracatheter Q24H     hydroxyurea  2,000 mg Oral Daily     lidocaine  1 patch Transdermal Q24h    And     lidocaine   Transdermal Q8H     polyethylene glycol  17 g Oral Daily     senna-docusate  1 tablet Oral BID    Or     senna-docusate  2 tablet Oral BID     sertraline  200 mg Oral Daily     sodium chloride (PF)  3 mL Intracatheter Q8H

## 2021-05-06 NOTE — PROGRESS NOTES
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     Since admission she had worsening PE with low Apixaban level so was switched to Lovenox. She also had exchange transfusion for suspected acute chest syndrome on 4/30. Her Hgb remains the same but her respiratory failure is not improving. She was treated for pulmonary edema and now her chest CT on 5/3/21 shows diffuse groundglass and consolidative opacities throughout the lungs, most pronounced in the dependent aspects of the lung fields. Findings are suspicious for infection, including atypical pneumonia. Less likely to represent cardiogenic pulmonary edema given the lack of pleural effusion. Also consider ongoing acute chest syndrome. So another exchange transfusion was done on 5/4 after discussion with pulmonary team, Dr. Hernandez and Dr. Duncan. Her oxygen requirement dramatically improved from 20L to 1-2L on 5/5/21 and she is off oxygen on 5/6.    # Worsening PE  - 4/27 V/Q scan shows bilateral subsegmental perfusion defects, left greater  than right, consistent with pulmonary emboli. Overall findings have  worsened since the comparison perfusion scan on 2/1/2021, particularly  within the left lung.   - She was admitted 2/1/21-2/3/21 with a new PE, started on Rivaroxaban, switched to  Eliquis 3/25/21 with finding of RUE DVT (? rivaroxaban failure). She only held Eliquis for 1 day for Left Port Removal and Left Port Placement on 4/21.   -She states that she was compliant with all Eliquis so it was continued since admission.  We checked her Apixaban level 4/29 and it was 29, much lower than expected, presumably due to poor absorption.  Switched to Lovenox 4/29 PM.  - continue Lovenox   - will need to decide on oral option when she is better    # Acute respiratory failure with hypoxia due to acute chest syndrome, CAP, fluid overload and anemia. TTE on 5/2 is not suggesting pulmonary hypertension or CHF. Resolved now.   # Acute Chest Syndrome: need exchange transfusion on 4/30 and 5/4 with her oxygen level down from 20L to 1-2L since 5/5.   # Community acquired PNA  # Sickle cell pain crisis: slightly worse. HGB and retic count is stable.   # Iron overload  - s/p RBC exchange on 4/30 d/t persistent fevers and increasing O2 requirements   - s/p exchange transfusion on 5/4  - off oxygen now  - pulmonary signed off  - ID signed off and she is now cefepime, plan for 7 days    Recommendations:   - continue pain regimen per primary team, ok to increase bolus dose  - continue Lovenox 1mg/kg; will need to determine outpatient anticoagulation closer to discharge   - daily CBC with retic count  - encourage ambulation or PT    Patient was seen and plan of care was discussed with attending physician Dr. Duncan.    We will continue to follow this patient. Please don't hesitate to contact the Fellow On-Call with questions.    Juliette Miles MD, PhD  Hematology/Oncology   Pager: [phone]    _________________________________________________________________    Subjective & Interval History:    She is off oxygen now. She is with her cousin. Her pain is not fully controlled but ok for her. Primary team did work up for back pain, negative.     Physical Exam:    /48 (BP Location: Left arm)   Pulse 82   Temp  98.8  F (37.1  C) (Oral)   Resp 18   Wt 75.4 kg (166 lb 4.8 oz)   SpO2 100%   BMI 28.55 kg/m    Gen: comfortably lying on bed, off oxygen,   CV: tachycardia  Pulm: Tachypnic, coarse breath sounds, no wheezing  Neuro: Alert and answering questions appropriately.     Labs & Studies: I personally reviewed the following studies:  ROUTINE LABS (Last four results):  CMP  Recent Labs   Lab 05/06/21  0723 05/05/21  0543 05/04/21  0450 05/03/21  1936 05/03/21  0502    137 140  --  140   POTASSIUM 3.6 4.0 4.1 3.2* 3.2*   CHLORIDE 102 104 106  --  104   CO2 26 26 29  --  28   ANIONGAP 6 7 4  --  8   GLC 93 87 80  --  96   BUN 3* 4* 5*  --  4*   CR 0.43* 0.47* 0.61  --  0.55   GFRESTIMATED >90 >90 >90  --  >90   GFRESTBLACK >90 >90 >90  --  >90   MICAH 9.0 8.8 8.5  --  8.3*   PROTTOTAL 7.4 6.8 6.3*  --  6.6*   ALBUMIN 2.7* 2.6* 2.6*  --  2.6*   BILITOTAL 1.3 1.7* 1.6*  --  1.5*   ALKPHOS 106 106 105  --  86   AST 46* 57* 57*  --  53*   ALT 35 41 44  --  44     CBC  Recent Labs   Lab 05/06/21  0723 05/05/21  0543 05/04/21  1900 05/04/21  1635 05/04/21  0450 05/03/21  0502   WBC 13.7* 13.2*  --   --  13.6* 15.8*   RBC 2.77* 2.75*  --   --  2.42* 2.43*   HGB 7.8* 7.8* 8.2* 7.0* 6.6* 6.9*   HCT 24.3* 23.9* 24.2* 21.9* 20.7* 21.1*   MCV 88 87  --   --  86 87   MCH 28.2 28.4  --   --  27.3 28.4   MCHC 32.1 32.6  --   --  31.9 32.7   RDW 19.3* 17.7*  --   --  22.9* 22.4*    253  --   --  358 297     INRNo lab results found in last 7 days.    Medications list for reference:  Current Facility-Administered Medications   Medication     acetaminophen (TYLENOL) tablet 650 mg     acetaminophen (TYLENOL) tablet 650 mg     albuterol (PROAIR HFA/PROVENTIL HFA/VENTOLIN HFA) 108 (90 Base) MCG/ACT inhaler 2 puff     albuterol (PROVENTIL) neb solution 2.5 mg     ARIPiprazole (ABILIFY) tablet 2 mg     ceFEPIme (MAXIPIME) 2 g vial to attach to  ml bag for ADULTS or 50 ml bag for PEDS     celecoxib (celeBREX) capsule 100 mg      deferasirox (JADENU) tablet 1,440 mg     diclofenac (VOLTAREN) 1 % topical gel 4 g     diphenhydrAMINE (BENADRYL) capsule 25 mg     enoxaparin ANTICOAGULANT (LOVENOX) injection 80 mg     fluticasone-vilanterol (BREO ELLIPTA) 200-25 MCG/INH inhaler 1 puff     heparin 100 UNIT/ML injection 5 mL     heparin 100 UNIT/ML injection 5 mL     hydroxyurea (HYDREA) capsule 2,000 mg     Lidocaine (LIDOCARE) 4 % Patch 1 patch    And     lidocaine patch in PLACE     lidocaine (LMX4) cream     lidocaine 1 % 0.1-1 mL     melatonin tablet 1 mg     morphine  PCA 1 mg/mL OPIOID TOLERANT     ondansetron (ZOFRAN) injection 4-8 mg     Patient is already receiving anticoagulation with heparin, enoxaparin (LOVENOX), warfarin (COUMADIN)  or other anticoagulant medication     polyethylene glycol (MIRALAX) Packet 17 g     senna-docusate (SENOKOT-S/PERICOLACE) 8.6-50 MG per tablet 1 tablet    Or     senna-docusate (SENOKOT-S/PERICOLACE) 8.6-50 MG per tablet 2 tablet     sertraline (ZOLOFT) tablet 200 mg     sodium chloride (PF) 0.9% PF flush 3 mL     sodium chloride (PF) 0.9% PF flush 3 mL     sodium chloride 0.9% infusion     sodium chloride 0.9% infusion     5/3/21 CT chest:   IMPRESSION:   1. Diffuse groundglass and consolidative opacities throughout the  lungs, most pronounced in the dependent aspects of the lung fields.  Findings are suspicious for infection, including atypical pneumonia.  Less likely to represent cardiogenic pulmonary edema given the lack of  pleural effusion. Also consider acute chest syndrome in a patient with  sickle cell.  2. Walled off fluid collection in the superior left chest wall, in the  region of the chest wall port. Findings may represent a postprocedural  seroma, less likely an abscess.  3. Enlarged thoracic and gastrohepatic lymph nodes. Findings may be  reactive in the setting of infection, or related to patient's  underlying sickle cell anemia.

## 2021-05-06 NOTE — PLAN OF CARE
Patient tx from 6b this afternoon. Had xray. Ate chipotle. On a pca. Patient skin is intact second skin check with carroll DIAZ.

## 2021-05-06 NOTE — CONSULTS
Interventional Radiology Adult/Peds IP Consult: Patient to be seen: Routine within 24 hours; Call back #: [phone]; Removal of femoral CVC; Requesting provider? Hospitalist (if different from attending physician) [[phone]]    Electronically signed by: Chhaya Vela PA-C on 05/06/21 0922     Call back # [phone]     Reason for Consult Removal of femoral CVC     Is the patient on an anticoagulant ? Yes    Specify Lovenox    Is the patient currently NPO ? No      ===    Copied information:  [  This is a 20 yo F with PMHx of sickle cell anemia c/b CVA with residual RUE hemiparesis, and PE, iron overload 2/2 frequent transfusions, asthma, depression, and anxiety who presented with worsening pain, admitted on 4/26/2021 for acute sickle cell pain crisis, complicated by acute hypoxic respiratory failure likely due to acute chest syndrome vs community acquired pneumonia. IR placed left femoral NTCVC on 4/30. We are now consulted for line removal as it is no longer needed.  ]    ===    Review of Epic entered chart data shows the patient is on enoxaparin AND apixaban anticoagulation    Platelets = 369  INR = 1.28    COVID-19 PCR results = negative 5/3    ===    Add-on today for femoral CVC removal.    Please contact the IR charge RN at [phone] for estimated time of procedure.

## 2021-05-07 ENCOUNTER — APPOINTMENT (OUTPATIENT)
Dept: GENERAL RADIOLOGY | Facility: CLINIC | Age: 21
DRG: 811 | End: 2021-05-07
Attending: PHYSICIAN ASSISTANT
Payer: COMMERCIAL

## 2021-05-07 ENCOUNTER — APPOINTMENT (OUTPATIENT)
Dept: PHYSICAL THERAPY | Facility: CLINIC | Age: 21
DRG: 811 | End: 2021-05-07
Attending: INTERNAL MEDICINE
Payer: COMMERCIAL

## 2021-05-07 LAB
ALBUMIN SERPL-MCNC: 2.6 G/DL (ref 3.4–5)
ALP SERPL-CCNC: 87 U/L (ref 40–150)
ALT SERPL W P-5'-P-CCNC: 28 U/L (ref 0–50)
ANION GAP SERPL CALCULATED.3IONS-SCNC: 6 MMOL/L (ref 3–14)
AST SERPL W P-5'-P-CCNC: 36 U/L (ref 0–45)
BILIRUB SERPL-MCNC: 1.4 MG/DL (ref 0.2–1.3)
BUN SERPL-MCNC: 5 MG/DL (ref 7–30)
CALCIUM SERPL-MCNC: 8.5 MG/DL (ref 8.5–10.1)
CHLORIDE SERPL-SCNC: 106 MMOL/L (ref 94–109)
CO2 SERPL-SCNC: 24 MMOL/L (ref 20–32)
CREAT SERPL-MCNC: 0.42 MG/DL (ref 0.52–1.04)
CRP SERPL-MCNC: 130 MG/L (ref 0–8)
ERYTHROCYTE [DISTWIDTH] IN BLOOD BY AUTOMATED COUNT: 20 % (ref 10–15)
GFR SERPL CREATININE-BSD FRML MDRD: >90 ML/MIN/{1.73_M2}
GLUCOSE SERPL-MCNC: 98 MG/DL (ref 70–99)
HCT VFR BLD AUTO: 22.9 % (ref 35–47)
HGB BLD-MCNC: 7.3 G/DL (ref 11.7–15.7)
LACTATE BLD-SCNC: 0.6 MMOL/L (ref 0.7–2)
MCH RBC QN AUTO: 28.3 PG (ref 26.5–33)
MCHC RBC AUTO-ENTMCNC: 31.9 G/DL (ref 31.5–36.5)
MCV RBC AUTO: 89 FL (ref 78–100)
PLATELET # BLD AUTO: 395 10E9/L (ref 150–450)
POTASSIUM SERPL-SCNC: 3.3 MMOL/L (ref 3.4–5.3)
POTASSIUM SERPL-SCNC: 3.4 MMOL/L (ref 3.4–5.3)
PROCALCITONIN SERPL-MCNC: 0.17 NG/ML
PROT SERPL-MCNC: 7 G/DL (ref 6.8–8.8)
RBC # BLD AUTO: 2.58 10E12/L (ref 3.8–5.2)
RETICS # AUTO: 141.9 10E9/L (ref 25–95)
RETICS/RBC NFR AUTO: 5.5 % (ref 0.5–2)
SODIUM SERPL-SCNC: 136 MMOL/L (ref 133–144)
SPECIMEN SOURCE: NORMAL
VZV DNA SPEC QL NAA+PROBE: NOT DETECTED
WBC # BLD AUTO: 13.9 10E9/L (ref 4–11)

## 2021-05-07 PROCEDURE — 97116 GAIT TRAINING THERAPY: CPT | Mod: GP | Performed by: PHYSICAL THERAPIST

## 2021-05-07 PROCEDURE — 120N000002 HC R&B MED SURG/OB UMMC

## 2021-05-07 PROCEDURE — 250N000011 HC RX IP 250 OP 636: Performed by: PHYSICIAN ASSISTANT

## 2021-05-07 PROCEDURE — 36415 COLL VENOUS BLD VENIPUNCTURE: CPT | Performed by: PHYSICIAN ASSISTANT

## 2021-05-07 PROCEDURE — 97110 THERAPEUTIC EXERCISES: CPT | Mod: GP | Performed by: PHYSICAL THERAPIST

## 2021-05-07 PROCEDURE — 250N000009 HC RX 250: Performed by: PHYSICIAN ASSISTANT

## 2021-05-07 PROCEDURE — 250N000013 HC RX MED GY IP 250 OP 250 PS 637: Performed by: PHYSICIAN ASSISTANT

## 2021-05-07 PROCEDURE — 99233 SBSQ HOSP IP/OBS HIGH 50: CPT | Performed by: INTERNAL MEDICINE

## 2021-05-07 PROCEDURE — 86140 C-REACTIVE PROTEIN: CPT | Performed by: PHYSICIAN ASSISTANT

## 2021-05-07 PROCEDURE — 97161 PT EVAL LOW COMPLEX 20 MIN: CPT | Mod: GP | Performed by: PHYSICAL THERAPIST

## 2021-05-07 PROCEDURE — 85045 AUTOMATED RETICULOCYTE COUNT: CPT | Performed by: PHYSICIAN ASSISTANT

## 2021-05-07 PROCEDURE — 99232 SBSQ HOSP IP/OBS MODERATE 35: CPT | Performed by: PEDIATRICS

## 2021-05-07 PROCEDURE — 258N000003 HC RX IP 258 OP 636: Performed by: PHYSICIAN ASSISTANT

## 2021-05-07 PROCEDURE — 83605 ASSAY OF LACTIC ACID: CPT | Performed by: INTERNAL MEDICINE

## 2021-05-07 PROCEDURE — 250N000013 HC RX MED GY IP 250 OP 250 PS 637: Performed by: PEDIATRICS

## 2021-05-07 PROCEDURE — 94640 AIRWAY INHALATION TREATMENT: CPT | Mod: 76

## 2021-05-07 PROCEDURE — 94640 AIRWAY INHALATION TREATMENT: CPT

## 2021-05-07 PROCEDURE — 71045 X-RAY EXAM CHEST 1 VIEW: CPT | Mod: 26 | Performed by: RADIOLOGY

## 2021-05-07 PROCEDURE — 250N000013 HC RX MED GY IP 250 OP 250 PS 637: Performed by: INTERNAL MEDICINE

## 2021-05-07 PROCEDURE — 99207 PR APP CREDIT; MD BILLING SHARED VISIT: CPT | Performed by: PHYSICIAN ASSISTANT

## 2021-05-07 PROCEDURE — 80053 COMPREHEN METABOLIC PANEL: CPT | Performed by: PHYSICIAN ASSISTANT

## 2021-05-07 PROCEDURE — 71045 X-RAY EXAM CHEST 1 VIEW: CPT

## 2021-05-07 PROCEDURE — 84132 ASSAY OF SERUM POTASSIUM: CPT | Performed by: INTERNAL MEDICINE

## 2021-05-07 PROCEDURE — 36415 COLL VENOUS BLD VENIPUNCTURE: CPT | Performed by: INTERNAL MEDICINE

## 2021-05-07 PROCEDURE — 999N000157 HC STATISTIC RCP TIME EA 10 MIN

## 2021-05-07 PROCEDURE — 84145 PROCALCITONIN (PCT): CPT | Performed by: PHYSICIAN ASSISTANT

## 2021-05-07 PROCEDURE — 87040 BLOOD CULTURE FOR BACTERIA: CPT | Performed by: PHYSICIAN ASSISTANT

## 2021-05-07 PROCEDURE — 250N000011 HC RX IP 250 OP 636: Performed by: NURSE PRACTITIONER

## 2021-05-07 PROCEDURE — 85027 COMPLETE CBC AUTOMATED: CPT | Performed by: PHYSICIAN ASSISTANT

## 2021-05-07 RX ORDER — POTASSIUM CHLORIDE 750 MG/1
40 TABLET, EXTENDED RELEASE ORAL ONCE
Status: DISCONTINUED | OUTPATIENT
Start: 2021-05-07 | End: 2021-05-07

## 2021-05-07 RX ORDER — POTASSIUM CHLORIDE 7.45 MG/ML
10 INJECTION INTRAVENOUS
Status: DISPENSED | OUTPATIENT
Start: 2021-05-07 | End: 2021-05-07

## 2021-05-07 RX ORDER — POTASSIUM CHLORIDE 750 MG/1
40 TABLET, EXTENDED RELEASE ORAL ONCE
Status: COMPLETED | OUTPATIENT
Start: 2021-05-07 | End: 2021-05-07

## 2021-05-07 RX ORDER — POTASSIUM CHLORIDE 750 MG/1
20 TABLET, EXTENDED RELEASE ORAL ONCE
Status: COMPLETED | OUTPATIENT
Start: 2021-05-07 | End: 2021-05-07

## 2021-05-07 RX ADMIN — FLUTICASONE FUROATE AND VILANTEROL TRIFENATATE 1 PUFF: 200; 25 POWDER RESPIRATORY (INHALATION) at 19:57

## 2021-05-07 RX ADMIN — SERTRALINE HYDROCHLORIDE 200 MG: 100 TABLET ORAL at 11:18

## 2021-05-07 RX ADMIN — ARIPIPRAZOLE 2 MG: 2 TABLET ORAL at 11:17

## 2021-05-07 RX ADMIN — POTASSIUM CHLORIDE 10 MEQ: 7.46 INJECTION, SOLUTION INTRAVENOUS at 17:47

## 2021-05-07 RX ADMIN — Medication: at 15:27

## 2021-05-07 RX ADMIN — ENOXAPARIN SODIUM 80 MG: 80 INJECTION SUBCUTANEOUS at 03:46

## 2021-05-07 RX ADMIN — HYDROXYUREA 2000 MG: 500 CAPSULE ORAL at 11:18

## 2021-05-07 RX ADMIN — CEFEPIME HYDROCHLORIDE 2 G: 2 INJECTION, POWDER, FOR SOLUTION INTRAVENOUS at 11:17

## 2021-05-07 RX ADMIN — POTASSIUM CHLORIDE 20 MEQ: 750 TABLET, EXTENDED RELEASE ORAL at 19:57

## 2021-05-07 RX ADMIN — ALBUTEROL SULFATE 2.5 MG: 2.5 SOLUTION RESPIRATORY (INHALATION) at 20:15

## 2021-05-07 RX ADMIN — CELECOXIB 100 MG: 100 CAPSULE ORAL at 12:34

## 2021-05-07 RX ADMIN — RIVAROXABAN 15 MG: 15 TABLET, FILM COATED ORAL at 17:51

## 2021-05-07 RX ADMIN — CEFEPIME HYDROCHLORIDE 2 G: 2 INJECTION, POWDER, FOR SOLUTION INTRAVENOUS at 03:46

## 2021-05-07 RX ADMIN — ALBUTEROL SULFATE 2.5 MG: 2.5 SOLUTION RESPIRATORY (INHALATION) at 10:19

## 2021-05-07 RX ADMIN — ACETAMINOPHEN 650 MG: 325 TABLET, FILM COATED ORAL at 11:18

## 2021-05-07 RX ADMIN — POTASSIUM CHLORIDE 40 MEQ: 750 TABLET, EXTENDED RELEASE ORAL at 22:49

## 2021-05-07 RX ADMIN — CEFEPIME HYDROCHLORIDE 2 G: 2 INJECTION, POWDER, FOR SOLUTION INTRAVENOUS at 19:57

## 2021-05-07 RX ADMIN — SODIUM CHLORIDE: 9 INJECTION, SOLUTION INTRAVENOUS at 00:45

## 2021-05-07 RX ADMIN — ALBUTEROL SULFATE 2.5 MG: 2.5 SOLUTION RESPIRATORY (INHALATION) at 16:50

## 2021-05-07 RX ADMIN — CELECOXIB 100 MG: 100 CAPSULE ORAL at 00:45

## 2021-05-07 RX ADMIN — POTASSIUM CHLORIDE 10 MEQ: 7.46 INJECTION, SOLUTION INTRAVENOUS at 16:02

## 2021-05-07 ASSESSMENT — ACTIVITIES OF DAILY LIVING (ADL)
ADLS_ACUITY_SCORE: 14
ADLS_ACUITY_SCORE: 15

## 2021-05-07 NOTE — PLAN OF CARE
ASSUMED CARES: [id number].  STATUS: Pt admitted 4/26 for Sickle Cell Crisis. Hx CVA- R sided hemiparesis. +PNA  NEURO: A/o x 4- Slow to respond at times. Flat Affect- Pleasent  VS: Febrile 100.5- Scheduled APAP given. Tachy (110's). Sats mid 90's on RA with NC as needed- Pt has not had the NC in during shift, but requested it be close to use PRN for comfort.   ACTIVITY: Up to BSC x 1 with SBA.   PAIN: C/o pain in back- Morphine PCA with cont and on demand - 1 hour limit 7.1mg   CARDIAC: Tachy- No C/o CP.   RESP: +ORTEGA. Mild SOB reported with movement- Breathing has improved since admission. Per pt.   GI/: Voided x 1-BSC. No BM during shift. LBM 5/5- per pt.   DIET: Regular.   SKIN: Intact.   LDA'S: R PIV x 2- One infusing PCA with NS TKO and other infusing NS 75 ml/hr. L Port- not accessed.   LABS: Lactic 0.4 K+ 3.6. Sputum needed.   CHANGES THIS SHIFT: Pt triggered sepsis this shift d/t febrile and tachy. Otherwise pt able to sleep in between cares- Jadenu not given d/t med not avail- pt states she doesn't have med and med not avail in bin- per MAR- pt to use own supply.   POC: Cont with POC. Attempt to wean O2- Pt states only uses for comfort- No O2 use at home. Call light within reach.

## 2021-05-07 NOTE — PLAN OF CARE
Pt is AO*4, lethargic. Pt is on RA, satting >97%, at times desats to 88%. Denies nausea. No complaints of chest pain. Regular diet.  Complains of generalized pain on PCA morphine. NS running at 75ml.hr. 2 R PIV and L chest port.  Pt is SBA. No BM overnight. Sputum is still needed. Follow plan of care.    Isaura Lindsay RN on 5/7/2021 at 6:54 AM

## 2021-05-07 NOTE — PROGRESS NOTES
[facility]    Medicine Progress Note - Hospitalist Service, Gold 4       Date of Admission:  4/26/2021  Assessment & Plan       Jennifer Cervantes is a 20 yo F with PMHx of sickle cell anemia c/b CVA with residual RUE hemiparesis, and PE, iron overload 2/2 frequent transfusions, asthma, depression, and anxiety who presented with worsening pain, admitted on 4/26/2021 for acute sickle cell pain crisis, complicated by acute hypoxic respiratory failure likely due to acute chest syndrome vs community acquired pneumonia.    #Acute sickle cell crisis   #Sepsis from Acute chest syndrome, resolved   #Acute respiratory failure, resolved    HgbSS c/b frequent pain crisis, hx of CVA with RUE hemiparesis, iron overload, and DVT/PE. Presented with pain over right side on ribs and back, with elevated retic count consistent with sickle cell crisis, treated with Morphine PCA. Hospital course complicated by hypoxia with increased O2 requirement, from 2-3L initially up to HFNC, and fevers, CXR with RLL concerning for pneumonia, with leukocytosis meeting sepsis criteria. Started on Ceftriaxone/Azithromycin. Lactic flat. COVID negative x2. TTE normal. Concern for acute chest s/p exchange transfusion on 4/30 with minimal improvement in oxygen requirements, but did improve fever curve.  With persistent hypoxia concern about another acute inflammatory or infectious process. Procalcitonin rising to 0.44. CT chest w/o contrast with diffuse GGO and consolidated opacities concerning for infection.  ID and pulmonary consulted with extensive infectious work up with negative RVP, strep pneumo and legionella urinary antigen, histo serum and urinary antigen, blasto urinary antigen, cryptococcus serum, Beta D glucan, galactomannan, VZV and HSV 1&2. Remaining studies still pending below.  Broadened antibiotics to Cefepime and Vanco (Vanco now discontinued after negative MRSA swab). Hematology recommended  repeat transfusion exchanged on 5/4, patient with significant improvement in respiratory status, now on RA. Patient continues to have back pain, consistent with prior SSC. Febrile overnight to 100.5F, and tachycardia.    -Appreciated ID and pulmonary recs who have since signed off  -Continue Cefepime 2g Q8H for 7 day course  (End date 5/11/2021)  -Repeat infectious work up with CXR, UA, blood cultures, Procal (down trending), and CRP (rising) with fever overnight   -Follow up sputum cx if able (dry cough), histo antigen serum, blasto antigen serum, coccidioides serum and urine antigen, CMV PCR blood, Fungal ab, Pneumocystis CPR and DFA from sputum (not yet collect)    -Hematology following, appreciate recs   -Pain management:    -Morphine PCA. Continuous rate 2.0 mg/hr, bolus dose 1.7 mg Q20 mins (1 hour limit 7.1 mg)    -Celebrex 100 mg BID, Tylenol 650 mg Q6H PRN, voltaren gel QID PRN.   -Trend CBC and reticulocyte count  -Bowel regimen, miralax daily, senna-colace   -Benadryl PRN    -Continue hydroxyurea 2000 mg daily   -Acapella and pulmonary toilet   -Encourage ambulation     #Worsening PE - Admitted on 2/1-2/3/2021 with a new PE and started on DOAC (initally on rivaroxaban and later switched to Eliquis after finding RUE DVT for possible rivaroxaban failure). Patient reports compliance with anticoagulation. On this admission, pt found to have bilateral subsegmental perfusion defects on VQ scan, worse compared to prior VQ scan on 2/1.   -Hematology consulted and following   -Discontinue lovenox  -Start Xarelto 15 mg BID x 21 days followed by 20 mg daily with food  -Rivaroxaban 10a level on 5/10 AM    #Seroma? Although the CT chest from 5/3 also showed howed walled off fluid collection in the superior left chest wall port, this was more suggestive of seroma (per CT report). No signs of inflammation around the port and blood cultures are all negative so far.   -Recommend repeat US of the area one week after the  previous CT (on 5/10) to assure that it is not an evolving process (may repeat sooner if continues to have fevers)     #Hx of iron overload - Continue PTA Deferasirox 1440 mg daily     #Asthma - Continue PTA Symbicort (formulary equivalent Breo Ellipta) and albuterol     #Anxiety  #Depression  - Continue PTA Sertraline 200 mg daily and Abilify 2 mg daily        Diet: Regular Diet Adult  Snacks/Supplements Adult: Ensure Max Protein; Between Meals  Snacks/Supplements Adult: Ensure Max Protein; With Meals    DVT Prophylaxis: Enoxaparin (Lovenox) SQ  Lopez Catheter: not present  Code Status: Full Code           Disposition Plan   Expected discharge: 4 - 7 days, recommended to prior living arrangement once adequate pain management/ tolerating PO medications and antibiotic plan established.  Entered: Chhaya Vela PA-C 05/07/2021, 12:10 PM       The patient's care was discussed with the Attending Physician, Dr. Brooklyn Valle , Bedside Nurse, Care Coordinator/, Patient and Hematology  Consultant.    Chhaya Vela PA-C  Hospitalist Service, [address]  Contact information available via [facility] Paging/Directory  Please see sign in/sign out for up to date coverage information  ______________________________________________________________________    Interval History   Patient reports generalized worse in her whole back, currently rated at 8.5/10. Feels the Morphine PCA helps with the pain, but does not take it away. She would like to keep the PCA rate where it is at today, and will let the team know if any issues arise.  Denies any SOB, CP, cough, subjective fevers, abdominal pain, vomiting or dysuria. Last BM this morning, soft and brown.     Data reviewed today: I reviewed all medications, new labs and imaging results over the last 24 hours.     Physical Exam   Vital Signs: Temp: 100.1  F (37.8  C) Temp src: Oral BP: 114/55 Pulse: 101   Resp: 16  SpO2: (!) 88 %(Pt refuses O2 Nurse notified.) O2 Device: None (Room air) Oxygen Delivery: 3 LPM  Weight: 165 lbs 1.99 oz  GENERAL: Alert and awake. NAD. Pleasant and conversational   HEENT: AT/NC. Anicteric sclera. MMM.   CARDIOVASCULAR: RRR. S1, S2. No murmurs, rubs, or gallops.   RESPIRATORY: Effort normal on RA. Rales in R middle and lower lobes with remaining lung fields clear, respirations unlabored   GI: Abdomen soft, non-tender abdomen without rebound or guarding, normoactive bowel sounds present  EXTREMITIES: No peripheral edema.  No calf asymmetry, erythema, or tenderness.   NEUROLOGICAL: CN II-XII grossly intact  SKIN: Intact. Warm and dry.   No jaundice.      Data   Recent Labs   Lab 05/07/21  0605 05/06/21  0723 05/05/21  0543   WBC 13.9* 13.7* 13.2*   HGB 7.3* 7.8* 7.8*   MCV 89 88 87    369 253    135 137   POTASSIUM 3.3* 3.6 4.0   CHLORIDE 106 102 104   CO2 24 26 26   BUN 5* 3* 4*   CR 0.42* 0.43* 0.47*   ANIONGAP 6 6 7   MICAH 8.5 9.0 8.8   GLC 98 93 87   ALBUMIN 2.6* 2.7* 2.6*   PROTTOTAL 7.0 7.4 6.8   BILITOTAL 1.4* 1.3 1.7*   ALKPHOS 87 106 106   ALT 28 35 41   AST 36 46* 57*     Recent Results (from the past 24 hour(s))   IR CVC Non Tunnel Line Removal    Narrative    DIAGNOSIS: Sickle cell pain crisis    PROCEDURE: IR non tunneled central venous catheter removal       Impression    IMPRESSION: Completed removal of LEFT femoral NON-tunneled central  venous catheter in its entirety. There were no complications.    ----------    CLINICAL HISTORY: The patient has a NON tunneled left femoral central  venous catheter placed left April 30, 2021 for acute sickle cell pain  crisis. Patient presents for removal as it is no longer needed.    Patient is on anoxic parents and exit and. Platelets are 369 and INR  1.28 today.    PERFORMED BY: Michelle Johnson PA-C    MEDICATIONS: None     DESCRIPTION: Physical examination demonstrated no erythema,  tenderness, fluctuance, or discharge at the catheter exit  site or  along the tract. The catheter and its entry site into the skin was  prepped and draped in usual sterile fashion.     Using steady gentle traction, the catheter was freed from the  subcutaneous tissue, and the entire catheter was removed without  difficulty. Compression was applied over the venipuncture site, as  well as along the tract, until good hemostasis was achieved for 10  minutes.  However patient rolled to try to use restroom and there was  bleeding again with manual compression applied over site for 10  minutes while patient utilized the bed pan with nursing assistance.    A sterile dressing was applied to the skin at the exit site.  Bed rest  for 30 minutes.     COMPLICATIONS:  No immediate concerns; the patient remained stable  throughout the procedure and tolerated it well.    ESTIMATED BLOOD LOSS:  5 mL    SPECIMENS: None    TIME:  A total of 20 minutes was spent with the patient. Greater than  50% of the time was spent in counseling, education, and coordination  of care.    VALERI BEST PA-C   XR Chest Port 1 View    Narrative    Portable chest    INDICATION: Fever and hypoxia    COMPARISON: Chest CT 5/3/2021    FINDINGS: Heart is upper normal in size. Left IJ port catheter tip is  in the right atrium. No pneumothorax. Bibasilar pulmonary opacities  are present which may indicate atelectasis or edema comment recommend  follow-up to clearing to exclude infection.      Impression    IMPRESSION: Upper normal heart size for portable technique. Scattered  areas of possible bibasilar atelectasis and/or edema.    MARY JANE JUÁREZ MD     Medications     morphine       - MEDICATION INSTRUCTIONS -       sodium chloride 75 mL/hr at 05/07/21 0700     sodium chloride 10 mL/hr at 05/06/21 1900       acetaminophen  650 mg Oral Q4H     albuterol  2.5 mg Nebulization 4x Daily     ARIPiprazole  2 mg Oral Daily     ceFEPIme (MAXIPIME) IV  2 g Intravenous Q8H     celecoxib  100 mg Oral BID     deferasirox   1,440 mg Oral QPM     enoxaparin ANTICOAGULANT  1 mg/kg Subcutaneous Q12H     fluticasone-vilanterol  1 puff Inhalation QPM     hydroxyurea  2,000 mg Oral Daily     lidocaine  1 patch Transdermal Q24h    And     lidocaine   Transdermal Q8H     polyethylene glycol  17 g Oral Daily     senna-docusate  1 tablet Oral BID    Or     senna-docusate  2 tablet Oral BID     sertraline  200 mg Oral Daily     sodium chloride (PF)  3 mL Intracatheter Q8H

## 2021-05-07 NOTE — PROGRESS NOTES
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     Hospitalization complicated by AHRF, worsening PE with low Apixaban levels, thus was switched to Lovenox. S/p exchange transfusion for suspected acute chest syndrome on 4/30 with respiratory failure not improving. CT chest on 5/3/21 shows diffuse groundglass and consolidative opacities throughout the lungs, most pronounced in the dependent aspects of the lung fields. Findings are suspicious for infection, including atypical pneumonia. Less likely to represent cardiogenic pulmonary edema given the lack of pleural effusion. Also consider ongoing acute chest syndrome. Second exchange transfusion was done on 5/4 and her oxygen requirements dramatically improved from 20L to 1-2L and now on RA. Cefepime x7 days.     # Pulmonary Embolism   # Acute respiratory failure, resolved.   # Acute Chest Syndrome s/p exchange transfusion on 4/30 and 5/4   # Community acquired PNA   # Sickle cell pain crisis  # Iron overload       Recommendations:   - discontinue Lovenox and start Xarelto 15 mg BID ramp up x21 days followed by 20 mg daily with food.   - recheck Rivaroxaban 10a level Monday AM (heme will order for you) --- goal for patient to discharge Monday.   - daily CBC with retic count  - Current PCA continuous Morphine  2 mg, bolus 1.7 mg with max 7.1 mg per hour    - Starting this evening: Decrease continuous infusion by ~25% every 12-24 hours (ie 1.5 -> 1 -> 0.5). Then decrease the dosage or frequency of PCA doses by ~25% every 12-24 hours. Then convert PCA to PO equivalent and order every 2-4 hours PRN pain along with PRN IV dose as needed for severe pain   - Please ensure adequate bowel regimen. Recommend Senna-S BID and Miralax daily    - Encourage incentive spirometry and ambulation.   - Continue with fluids and anti-emetics until PO intake improves  - Continue with Hydrea 2,000mg daily   - Do NOT transfuse blood unless discussed with Hematology first.   - Please page hematology if patient develops fever, acute shortness of breath or other symptoms of acute chest syndrome      Patient was seen and plan of care was discussed with attending physician Dr. Duncan.    We will continue to follow this patient. Please don't hesitate to contact the Fellow On-Call with questions.    Erlinda QuirogaWilmington HospitalPavel, HANNY    Hematology/Oncology   Pager: [phone]     _________________________________________________________________    Subjective & Interval History:    She is off oxygen now, able to ambulate the halls today. Pain is getting better, open to weaning down slightly on PCA dose this evening. Hopeful to discharge in coming days back to the [facility] where she lives with her cousin.     Physical Exam:    /55 (BP Location: Left arm)   Pulse 101   Temp 100.1  F (37.8  C) (Oral)   Resp 16   Wt 74.9 kg (165 lb 2 oz)   SpO2 (!) 88%   BMI 28.34 kg/m    Gen: comfortably lying on bed, NAD  CV: tachycardia  Pulm: coarse breath sounds, no wheezing, on RA   Neuro: Alert and answering questions appropriately.     Labs & Studies: I personally reviewed the following studies:  ROUTINE LABS (Last four results):  CMP  Recent Labs   Lab 05/07/21  0605 05/06/21  0723 05/05/21  0543 05/04/21  0450    135 137 140   POTASSIUM 3.3* 3.6  4.0 4.1   CHLORIDE 106 102 104 106   CO2 24 26 26 29   ANIONGAP 6 6 7 4   GLC 98 93 87 80   BUN 5* 3* 4* 5*   CR 0.42* 0.43* 0.47* 0.61   GFRESTIMATED >90 >90 >90 >90   GFRESTBLACK >90 >90 >90 >90   MICAH 8.5 9.0 8.8 8.5   PROTTOTAL 7.0 7.4 6.8 6.3*   ALBUMIN 2.6* 2.7* 2.6* 2.6*   BILITOTAL 1.4* 1.3 1.7* 1.6*   ALKPHOS 87 106 106 105   AST 36 46* 57* 57*   ALT 28 35 41 44     CBC  Recent Labs   Lab 05/07/21  0605 05/06/21  0723 05/05/21  0543 05/04/21  1900 05/04/21  0450 05/04/21  0450   WBC 13.9* 13.7* 13.2*  --   --  13.6*   RBC 2.58* 2.77* 2.75*  --   --  2.42*   HGB 7.3* 7.8* 7.8* 8.2*   < > 6.6*   HCT 22.9* 24.3* 23.9* 24.2*   < > 20.7*   MCV 89 88 87  --   --  86   MCH 28.3 28.2 28.4  --   --  27.3   MCHC 31.9 32.1 32.6  --   --  31.9   RDW 20.0* 19.3* 17.7*  --   --  22.9*    369 253  --   --  358    < > = values in this interval not displayed.     INRNo lab results found in last 7 days.    Medications list for reference:  Current Facility-Administered Medications   Medication     acetaminophen (TYLENOL) tablet 650 mg     acetaminophen (TYLENOL) tablet 650 mg     albuterol (PROAIR HFA/PROVENTIL HFA/VENTOLIN HFA) 108 (90 Base) MCG/ACT inhaler 2 puff     albuterol (PROVENTIL) neb solution 2.5 mg     ARIPiprazole (ABILIFY) tablet 2 mg     ceFEPIme (MAXIPIME) 2 g vial to attach to  ml bag for ADULTS or 50 ml bag for PEDS     celecoxib (celeBREX) capsule 100 mg     deferasirox (JADENU) tablet 1,440 mg     diclofenac (VOLTAREN) 1 % topical gel 4 g     diphenhydrAMINE (BENADRYL) capsule 25 mg     enoxaparin ANTICOAGULANT (LOVENOX) injection 80 mg     fluticasone-vilanterol (BREO ELLIPTA) 200-25 MCG/INH inhaler 1 puff     heparin 100 UNIT/ML injection 5 mL     heparin 100 UNIT/ML injection 5 mL     hydroxyurea (HYDREA) capsule 2,000 mg     Lidocaine (LIDOCARE) 4 % Patch 1 patch    And     lidocaine patch in PLACE     lidocaine (LMX4) cream     lidocaine 1 % 0.1-1 mL     melatonin tablet 1 mg     morphine   PCA 1 mg/mL OPIOID TOLERANT     ondansetron (ZOFRAN) injection 4-8 mg     Patient is already receiving anticoagulation with heparin, enoxaparin (LOVENOX), warfarin (COUMADIN)  or other anticoagulant medication     polyethylene glycol (MIRALAX) Packet 17 g     senna-docusate (SENOKOT-S/PERICOLACE) 8.6-50 MG per tablet 1 tablet    Or     senna-docusate (SENOKOT-S/PERICOLACE) 8.6-50 MG per tablet 2 tablet     sertraline (ZOLOFT) tablet 200 mg     sodium chloride (PF) 0.9% PF flush 3 mL     sodium chloride (PF) 0.9% PF flush 3 mL     sodium chloride 0.9% infusion     sodium chloride 0.9% infusion     5/3/21 CT chest:   IMPRESSION:   1. Diffuse groundglass and consolidative opacities throughout the  lungs, most pronounced in the dependent aspects of the lung fields.  Findings are suspicious for infection, including atypical pneumonia.  Less likely to represent cardiogenic pulmonary edema given the lack of  pleural effusion. Also consider acute chest syndrome in a patient with  sickle cell.  2. Walled off fluid collection in the superior left chest wall, in the  region of the chest wall port. Findings may represent a postprocedural  seroma, less likely an abscess.  3. Enlarged thoracic and gastrohepatic lymph nodes. Findings may be  reactive in the setting of infection, or related to patient's  underlying sickle cell anemia.

## 2021-05-07 NOTE — PLAN OF CARE
Patient had a late lunch at [employer]. Drinking her supplement. Patient on morphine PCA. Up to commode with sba. Iv abx.

## 2021-05-07 NOTE — PROGRESS NOTES
05/07/21 1205   Quick Adds   Type of Visit Initial PT Evaluation   Living Environment   People in home parent(s);sibling(s)   Current Living Arrangements house   Home Accessibility stairs to enter home;stairs within home   Number of Stairs, Main Entrance 3   Stair Railings, Main Entrance none   Number of Stairs, Within Home, Primary other (see comments)  (20)   Stair Railings, Within Home, Primary railing on right side (ascending)   Transportation Anticipated family or friend will provide   Self-Care   Usual Activity Tolerance good   Current Activity Tolerance moderate   Regular Exercise Yes   Activity/Exercise Type strength training   Exercise Amount/Frequency 3-5 times/wk   Equipment Currently Used at Home none   Disability/Function   Hearing Difficulty or Deaf no   Wear Glasses or Blind no   Concentrating, Remembering or Making Decisions Difficulty no   Difficulty Communicating no   Difficulty Eating/Swallowing no   Walking or Climbing Stairs Difficulty no   Dressing/Bathing Difficulty no   Toileting issues no   Doing Errands Independently Difficulty (such as shopping) no   Fall history within last six months no   Change in Functional Status Since Onset of Current Illness/Injury no   General Information   Onset of Illness/Injury or Date of Surgery 04/26/21   Referring Physician Ginna Valle MD   Patient/Family Therapy Goals Statement (PT) Return home   Pertinent History of Current Problem (include personal factors and/or comorbidities that impact the POC) Pt admitted thru ED for sickle cell pain crisis. 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 (acute and chronic components), history of stroke leading to significant cognitive delays and right upper extremity hemiparesis, iron overload 2/2 chronic transfusions as secondary ppx post-CVA, anxiety/depression, asthma, and chronic Right innominate vein thrombosis and 3 moderate subsegmental pulmonary perfusion defects (right greater than left) on 2/1    Existing Precautions/Restrictions no known precautions/restrictions   General Observations Pt supine upon PTs arrival; agreeable to PT session    Cognition   Orientation Status (Cognition) oriented x 4   Affect/Mental Status (Cognition) WFL   Follows Commands (Cognition) WFL   Pain Assessment   Patient Currently in Pain Yes, see Vital Sign flowsheet   Range of Motion (ROM)   ROM Quick Adds ROM WFL   ROM Comment L/Es only assessed; has h/o CVA with right U/E hemiparesis   Strength   Manual Muscle Testing Quick Adds Strength WFL   Strength Comments L/Es only assessed; has h/o CVA with right U/E hemiparesis   Bed Mobility   Comment (Bed Mobility) independent rolling, repositioning & supine to/from sit with bed flat, no rails   Transfers   Transfer Safety Comments independent sit to/from stand from EOB without U/E support   Gait/Stairs (Locomotion)   Comment (Gait/Stairs) SBA to amb without assistive device on level 500 ft   Clinical Impression   Criteria for Skilled Therapeutic Intervention yes, treatment indicated   PT Diagnosis (PT) deconditioning   Influenced by the following impairments decreased activity tolerance    Functional limitations due to impairments decreased spO2 with activity on RA   Clinical Presentation Stable/Uncomplicated   Clinical Presentation Rationale based upon subjective information provided, objective exam findings, medical history & clinical judgement   Clinical Decision Making (Complexity) low complexity   Therapy Frequency (PT) 3x/week   Predicted Duration of Therapy Intervention (days/wks) 1 week   Planned Therapy Interventions (PT) home exercise program;stair training;other (see comments)  (conditioning)   Risk & Benefits of therapy have been explained evaluation/treatment results reviewed;risks/benefits reviewed;participants voiced agreement with care plan;participants included;patient   PT Discharge Planning    PT Discharge Recommendation (DC Rec) home   PT Rationale for DC Rec Pt  demonstrates independence with bed mobility & transfers, SBA for gait on level without device; anticipate that pt will progress to independent amb with additional PT sessions while inpatient for safe discharge to home    PT Brief overview of current status  SBA for gait   Total Evaluation Time   Total Evaluation Time (Minutes) 10

## 2021-05-07 NOTE — PLAN OF CARE
Patient had temp last night refusing tylenol aat times had chest xray and blood cx will need a ua. Patient need to have k replaced did not want to take the potassium pills wanted iv . Patient has not eaten anything today is drinking fluids. Iv fluids discontined. New cartridge for her pca at end of shift.

## 2021-05-08 LAB
ALBUMIN SERPL-MCNC: 2.6 G/DL (ref 3.4–5)
ALP SERPL-CCNC: 88 U/L (ref 40–150)
ALT SERPL W P-5'-P-CCNC: 28 U/L (ref 0–50)
ANION GAP SERPL CALCULATED.3IONS-SCNC: 6 MMOL/L (ref 3–14)
AST SERPL W P-5'-P-CCNC: 41 U/L (ref 0–45)
BASOPHILS # BLD AUTO: 0.1 10E9/L (ref 0–0.2)
BASOPHILS NFR BLD AUTO: 1.3 %
BILIRUB SERPL-MCNC: 1.1 MG/DL (ref 0.2–1.3)
BLD PROD TYP BPU: NORMAL
BLD UNIT ID BPU: 0
BLOOD PRODUCT CODE: NORMAL
BPU ID: NORMAL
BUN SERPL-MCNC: 4 MG/DL (ref 7–30)
CALCIUM SERPL-MCNC: 8.4 MG/DL (ref 8.5–10.1)
CHLORIDE SERPL-SCNC: 107 MMOL/L (ref 94–109)
CMV DNA SPEC QL NAA+PROBE: NOT DETECTED
CO2 SERPL-SCNC: 23 MMOL/L (ref 20–32)
CREAT SERPL-MCNC: 0.49 MG/DL (ref 0.52–1.04)
CRP SERPL-MCNC: 140 MG/L (ref 0–8)
DIFFERENTIAL METHOD BLD: ABNORMAL
EOSINOPHIL # BLD AUTO: 0.9 10E9/L (ref 0–0.7)
EOSINOPHIL NFR BLD AUTO: 8.8 %
ERYTHROCYTE [DISTWIDTH] IN BLOOD BY AUTOMATED COUNT: 20.2 % (ref 10–15)
GFR SERPL CREATININE-BSD FRML MDRD: >90 ML/MIN/{1.73_M2}
GLUCOSE SERPL-MCNC: 91 MG/DL (ref 70–99)
HCT VFR BLD AUTO: 24.2 % (ref 35–47)
HGB BLD-MCNC: 7.5 G/DL (ref 11.7–15.7)
IMM GRANULOCYTES # BLD: 0.1 10E9/L (ref 0–0.4)
IMM GRANULOCYTES NFR BLD: 0.9 %
LYMPHOCYTES # BLD AUTO: 1.5 10E9/L (ref 0.8–5.3)
LYMPHOCYTES NFR BLD AUTO: 15.2 %
MCH RBC QN AUTO: 27.9 PG (ref 26.5–33)
MCHC RBC AUTO-ENTMCNC: 31 G/DL (ref 31.5–36.5)
MCV RBC AUTO: 90 FL (ref 78–100)
MONOCYTES # BLD AUTO: 0.7 10E9/L (ref 0–1.3)
MONOCYTES NFR BLD AUTO: 7.3 %
NEUTROPHILS # BLD AUTO: 6.7 10E9/L (ref 1.6–8.3)
NEUTROPHILS NFR BLD AUTO: 66.5 %
NRBC # BLD AUTO: 0.3 10*3/UL
NRBC BLD AUTO-RTO: 3 /100
PLATELET # BLD AUTO: 515 10E9/L (ref 150–450)
POTASSIUM SERPL-SCNC: 3.8 MMOL/L (ref 3.4–5.3)
PROT SERPL-MCNC: 7.3 G/DL (ref 6.8–8.8)
RBC # BLD AUTO: 2.69 10E12/L (ref 3.8–5.2)
RETICS # AUTO: 280 10E9/L (ref 25–95)
RETICS/RBC NFR AUTO: 10.4 % (ref 0.5–2)
SODIUM SERPL-SCNC: 136 MMOL/L (ref 133–144)
SPECIMEN SOURCE: NORMAL
TRANSFUSION STATUS PATIENT QL: NORMAL
TRANSFUSION STATUS PATIENT QL: NORMAL
WBC # BLD AUTO: 10 10E9/L (ref 4–11)

## 2021-05-08 PROCEDURE — 99207 PR APP CREDIT; MD BILLING SHARED VISIT: CPT | Performed by: PHYSICIAN ASSISTANT

## 2021-05-08 PROCEDURE — 120N000002 HC R&B MED SURG/OB UMMC

## 2021-05-08 PROCEDURE — 250N000013 HC RX MED GY IP 250 OP 250 PS 637: Performed by: PHYSICIAN ASSISTANT

## 2021-05-08 PROCEDURE — 85025 COMPLETE CBC W/AUTO DIFF WBC: CPT | Performed by: PHYSICIAN ASSISTANT

## 2021-05-08 PROCEDURE — 99232 SBSQ HOSP IP/OBS MODERATE 35: CPT | Mod: GC | Performed by: PEDIATRICS

## 2021-05-08 PROCEDURE — 80053 COMPREHEN METABOLIC PANEL: CPT | Performed by: PHYSICIAN ASSISTANT

## 2021-05-08 PROCEDURE — 250N000011 HC RX IP 250 OP 636: Performed by: PHYSICIAN ASSISTANT

## 2021-05-08 PROCEDURE — 36415 COLL VENOUS BLD VENIPUNCTURE: CPT | Performed by: PHYSICIAN ASSISTANT

## 2021-05-08 PROCEDURE — 99233 SBSQ HOSP IP/OBS HIGH 50: CPT | Performed by: INTERNAL MEDICINE

## 2021-05-08 PROCEDURE — 86140 C-REACTIVE PROTEIN: CPT | Performed by: PHYSICIAN ASSISTANT

## 2021-05-08 PROCEDURE — 250N000013 HC RX MED GY IP 250 OP 250 PS 637: Performed by: PEDIATRICS

## 2021-05-08 PROCEDURE — 85045 AUTOMATED RETICULOCYTE COUNT: CPT | Performed by: PHYSICIAN ASSISTANT

## 2021-05-08 RX ORDER — ALBUTEROL SULFATE 0.83 MG/ML
2.5 SOLUTION RESPIRATORY (INHALATION) EVERY 6 HOURS PRN
Status: DISCONTINUED | OUTPATIENT
Start: 2021-05-08 | End: 2021-05-11 | Stop reason: HOSPADM

## 2021-05-08 RX ADMIN — FLUTICASONE FUROATE AND VILANTEROL TRIFENATATE 1 PUFF: 200; 25 POWDER RESPIRATORY (INHALATION) at 20:01

## 2021-05-08 RX ADMIN — DEFERASIROX 1440 MG: 360 TABLET ORAL at 20:01

## 2021-05-08 RX ADMIN — ACETAMINOPHEN 650 MG: 325 TABLET, FILM COATED ORAL at 08:44

## 2021-05-08 RX ADMIN — CEFEPIME HYDROCHLORIDE 2 G: 2 INJECTION, POWDER, FOR SOLUTION INTRAVENOUS at 19:06

## 2021-05-08 RX ADMIN — CELECOXIB 100 MG: 100 CAPSULE ORAL at 22:37

## 2021-05-08 RX ADMIN — RIVAROXABAN 15 MG: 15 TABLET, FILM COATED ORAL at 17:25

## 2021-05-08 RX ADMIN — ARIPIPRAZOLE 2 MG: 2 TABLET ORAL at 08:41

## 2021-05-08 RX ADMIN — CELECOXIB 100 MG: 100 CAPSULE ORAL at 00:19

## 2021-05-08 RX ADMIN — CEFEPIME HYDROCHLORIDE 2 G: 2 INJECTION, POWDER, FOR SOLUTION INTRAVENOUS at 10:55

## 2021-05-08 RX ADMIN — POLYETHYLENE GLYCOL 3350 17 G: 17 POWDER, FOR SOLUTION ORAL at 08:44

## 2021-05-08 RX ADMIN — CELECOXIB 100 MG: 100 CAPSULE ORAL at 10:55

## 2021-05-08 RX ADMIN — HYDROXYUREA 2000 MG: 500 CAPSULE ORAL at 08:42

## 2021-05-08 RX ADMIN — ACETAMINOPHEN 650 MG: 325 TABLET, FILM COATED ORAL at 22:37

## 2021-05-08 RX ADMIN — RIVAROXABAN 15 MG: 15 TABLET, FILM COATED ORAL at 08:42

## 2021-05-08 RX ADMIN — SERTRALINE HYDROCHLORIDE 200 MG: 100 TABLET ORAL at 08:41

## 2021-05-08 RX ADMIN — DOCUSATE SODIUM 50 MG AND SENNOSIDES 8.6 MG 2 TABLET: 8.6; 5 TABLET, FILM COATED ORAL at 08:41

## 2021-05-08 RX ADMIN — ACETAMINOPHEN 650 MG: 325 TABLET, FILM COATED ORAL at 17:26

## 2021-05-08 RX ADMIN — Medication: at 20:02

## 2021-05-08 RX ADMIN — CEFEPIME HYDROCHLORIDE 2 G: 2 INJECTION, POWDER, FOR SOLUTION INTRAVENOUS at 03:10

## 2021-05-08 ASSESSMENT — ACTIVITIES OF DAILY LIVING (ADL)
ADLS_ACUITY_SCORE: 14

## 2021-05-08 NOTE — PLAN OF CARE
Assumed cares 1100 - 1530    /64 (BP Location: Left arm)   Pulse 95   Temp 96.9  F (36.1  C) (Oral)   Resp 18   Wt 75.4 kg (166 lb 3.6 oz)   SpO2 96%   BMI 28.53 kg/m      A&Ox4. Flat affect. VSS on RA. Denies pain, SOB, and N/V. SBA. No BM. Voiding adequately. R PIV infusing Morphine PCA and R PIV TKO for intermittent ABX.

## 2021-05-08 NOTE — PLAN OF CARE
ASSUMED CARES: [id number].  STATUS: Pt admitted 4/26 for Sickle Cell Crisis. Hx CVA- R sided hemiparesis. +PNA  NEURO: A/o x 4. Flat Affect- Pleasent  VS: Tachy (110's). All other VSS on RA.   ACTIVITY: Up to BSC with SBA.   PAIN: Denies pain- Morphine PCA with cont and on demand - 1 hour limit 7.1mg   CARDIAC: Tachy- No C/o CP.   RESP: +ORTEGA.   GI/: Voided x 1-BSC- Unable to collect UA d/t no hat in commode and pt couldn't wait.  No BM during shift. LBM 5/5- per pt.   DIET: Regular.   SKIN: Intact.   LDA'S: R PIV x 2- One infusing PCA with NS TKO and other TKO for IV abx. L Port- not accessed.   LABS: Lactic 0.6 K+ (3.3) (3.4)- replaced x 2 overnight- recheck 3.8  Sputum needed. UA needed- hat in BSC.   CHANGES THIS SHIFT: Pt triggered sepsis this shift.  Otherwise pt able to sleep in between cares.   POC: Cont with POC. Plan for Xarelto level check on Monday and than possible discharge either  Monday or Tuesday per MD note. Call light within reach.     Per Hem MD note, plans to slowly taper PCA by 25% Q12-24H- This has not yet been ordered- Than will transition to PO.

## 2021-05-08 NOTE — PROGRESS NOTES
[facility]    Medicine Progress Note - Hospitalist Service, Gold 4       Date of Admission:  4/26/2021  Assessment & Plan       Jennifer Cervantes is a 22 yo F with PMHx of sickle cell anemia c/b CVA with residual RUE hemiparesis, and PE, iron overload 2/2 frequent transfusions, asthma, depression, and anxiety who presented with worsening pain, admitted on 4/26/2021 for acute sickle cell pain crisis, complicated by acute hypoxic respiratory failure due to acute chest syndrome.     #Acute sickle cell crisis, improving   #Sepsis from Acute chest syndrome, resolved   #Acute respiratory failure, resolved    HgbSS c/b frequent pain crisis, hx of CVA with RUE hemiparesis, iron overload, and DVT/PE. Presented with pain over right side on ribs and back, with elevated retic count consistent with sickle cell crisis, treated with supportive care and Morphine PCA. Hospital course complicated by hypoxia with increased O2 requirement, from 2-3L initially up to HFNC, and fevers, CXR with RLL concerning for pneumonia, with leukocytosis meeting sepsis criteria. Started on Ceftriaxone/Azithromycin. Lactic flat. COVID negative x2. TTE normal. Concern for acute chest s/p exchange transfusion on 4/30 with minimal improvement in oxygen requirements, but did improve fever curve.  With persistent hypoxia concern about another acute inflammatory or infectious process. Procalcitonin rising to 0.44. CT chest w/o contrast with diffuse GGO and consolidated opacities concerning for infection.  ID and pulmonary consulted with extensive infectious work up with negative RVP, strep pneumo and legionella urinary antigen, histo serum and urinary antigen, blasto urinary antigen, cryptococcus serum, Beta D glucan, galactomannan, CMV, VZV and HSV 1&2. Remaining studies still pending below.  Broadened antibiotics to Cefepime and Vanco (Vanco now discontinued after negative MRSA swab). Hematology recommended repeat  transfusion exchanged on 5/4, patient with significant improvement in respiratory status, now on RA. Patient continues to have back pain, consistent with prior SSC. Febrile overnight to 100.5F, and tachycardia.    -Appreciated ID and pulmonary recs who have since signed off  -Continue Cefepime 2g Q8H for 7 day course  (End date 5/11/2021)  -Follow up repeat blood cultures on 5/7    -Follow up histo antigen serum, blasto antigen serum, coccidioides serum and urine antigen, CMV PCR blood, Fungal ab.   -Discontinue sputum cultures and PJP sputum cx with resolution of symptoms and no productive cough  -Hematology following, appreciate recs   -Pain management:    -Morphine PCA. Continuous rate 2.0 mg/hr, decrease bolus dose 1.2 mg Q20 mins   -Celebrex 100 mg BID, Tylenol 650 mg Q6H PRN, voltaren gel QID PRN.   -Trend CBC, CRP and reticulocyte count  -Bowel regimen, miralax daily, senna-colace   -Benadryl PRN    -Continue hydroxyurea 2000 mg daily   -Acapella and pulmonary toilet   -Encourage ambulation     #Worsening PE - Admitted on 2/1-2/3/2021 with a new PE and started on DOAC (initally on rivaroxaban and later switched to Eliquis after finding RUE DVT for possible rivaroxaban failure). Patient reports compliance with anticoagulation. On this admission, pt found to have bilateral subsegmental perfusion defects on VQ scan, worse compared to prior VQ scan on 2/1.   -Hematology consulted and following   -Continue Xarelto 15 mg BID x 21 days followed by 20 mg daily with food  -Rivaroxaban 10a level on 5/10 AM    #Seroma? Although the CT chest from 5/3 also showed howed walled off fluid collection in the superior left chest wall port, this was more suggestive of seroma (per CT report). No signs of inflammation around the port and blood cultures are all negative so far.   -Recommend repeat US of the area one week after the previous CT (on 5/10) to assure that it is not an evolving process (may repeat sooner if continues to  have fevers)     #Hx of iron overload - Continue PTA Deferasirox 1440 mg daily     #Asthma - Continue PTA Symbicort (formulary equivalent Breo Ellipta) and albuterol     #Anxiety  #Depression  - Continue PTA Sertraline 200 mg daily and Abilify 2 mg daily          Diet: Regular Diet Adult  Snacks/Supplements Adult: Ensure Max Protein; Between Meals  Snacks/Supplements Adult: Ensure Max Protein; With Meals    DVT Prophylaxis: Xarelto   Lopez Catheter: not present  Code Status: Full Code           Disposition Plan   Expected discharge: 2 - 3 days, recommended to prior living arrangement once adequate pain management/ tolerating PO medications.  Entered: Chhaya Vela PA-C 05/08/2021, 2:27 PM       The patient's care was discussed with the Attending Physician, Dr. Ginna Valle , Bedside Nurse, Patient and Hematology Consultant.    Chhaya Vela PA-C  Hospitalist Service, [address]  Contact information available via [facility] Paging/Directory  Please see sign in/sign out for up to date coverage information  ______________________________________________________________________    Interval History   Patient states her pain is better today. Agreeable to going down on pain medications. Denies any F/C, CP, SOB, abdominal pain, or N/V. Patient states she was previously on Xarelto and co-pay was affordable.     Data reviewed today: I reviewed all medications, new labs and imaging results over the last 24 hours.     Physical Exam   Vital Signs: Temp: 96.9  F (36.1  C) Temp src: Oral BP: 105/64 Pulse: 95   Resp: 18 SpO2: 96 % O2 Device: None (Room air)    Weight: 166 lbs 3.63 oz  GENERAL: Alert and awake. Lying comfortably in bed. Pleasant and conversational.   HEENT: AT/NC. Anicteric sclera. Mucous membranes moist   CARDIOVASCULAR: RRR. S1, S2. No murmurs, rubs, or gallops.   RESPIRATORY: Effort normal on RA. Faint rales in LLL, with remaining lung fields CTA.   respirations unlabored   GI: Abdomen soft, non-tender abdomen without rebound or guarding, normoactive bowel sounds present  EXTREMITIES: No peripheral edema. No calf asymmetry, erythema, or tenderness.   NEUROLOGICAL: CN II-XII grossly intact. R upper extremity paresis.    SKIN: Intact. Warm and dry. No jaundice.     Data   Recent Labs   Lab 05/08/21  0315 05/07/21  2134 05/07/21  0605 05/06/21  0723   WBC 10.0  --  13.9* 13.7*   HGB 7.5*  --  7.3* 7.8*   MCV 90  --  89 88   *  --  395 369     --  136 135   POTASSIUM 3.8 3.4 3.3* 3.6   CHLORIDE 107  --  106 102   CO2 23  --  24 26   BUN 4*  --  5* 3*   CR 0.49*  --  0.42* 0.43*   ANIONGAP 6  --  6 6   MICAH 8.4*  --  8.5 9.0   GLC 91  --  98 93   ALBUMIN 2.6*  --  2.6* 2.7*   PROTTOTAL 7.3  --  7.0 7.4   BILITOTAL 1.1  --  1.4* 1.3   ALKPHOS 88  --  87 106   ALT 28  --  28 35   AST 41  --  36 46*     No results found for this or any previous visit (from the past 24 hour(s)).

## 2021-05-08 NOTE — PLAN OF CARE
No acute events this shift. Patient appeared calm, verbally responsive with no c/o pain or discomfort and no respiratory issues noted. With ongoing Morphine PCA and currently on potassium replacement. Fair appetite this shift. Resting. No other calls made.

## 2021-05-08 NOTE — PLAN OF CARE
D/I: Pt is alert and oriented x4, flat affect, calm and cooperative. With ongoing Morphine PCA. Fair appetite this shift.   P: Continue to monitor pt and follow plan of care

## 2021-05-08 NOTE — PROGRESS NOTES
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     Since admission she had worsening PE with low Apixaban level so was switched to Lovenox. She also had exchange transfusion for suspected acute chest syndrome on 4/30. Her Hgb remains the same but her respiratory failure is not improving. She was treated for pulmonary edema and now her chest CT on 5/3/21 shows diffuse groundglass and consolidative opacities throughout the lungs, most pronounced in the dependent aspects of the lung fields. Findings are suspicious for infection, including atypical pneumonia. Less likely to represent cardiogenic pulmonary edema given the lack of pleural effusion. Also consider ongoing acute chest syndrome. So another exchange transfusion was done on 5/4 after discussion with pulmonary team, Dr. Hernandez and Dr. Duncan. Her oxygen requirement dramatically improved from 20L to 1-2L on 5/5/21 and she is off oxygen on 5/6.    Pain is improving and I would recommend stepping down on PCA bumps today, maybe to 1.2 mg vs 1.7 mg currently (I think continuous could stay the same) to continue recovery with early next week discharge trends.      # Worsening PE  - 4/27 V/Q scan shows bilateral subsegmental perfusion defects, left greater  than right, consistent with  pulmonary emboli. Overall findings have  worsened since the comparison perfusion scan on 2/1/2021, particularly  within the left lung.   - She was admitted 2/1/21-2/3/21 with a new PE, started on Rivaroxaban, switched to Eliquis 3/25/21 with finding of RUE DVT (? rivaroxaban failure). She only held Eliquis for 1 day for Left Port Removal and Left Port Placement on 4/21.   -She states that she was compliant with all Eliquis so it was continued since admission.  We checked her Apixaban level 4/29 and it was 29, much lower than expected, presumably due to poor absorption.  Switched to Lovenox 4/29 PM.  - Changed to rivaroxaban. Encourage heavy meals when taking. Will check level on Monday    # Acute respiratory failure with hypoxia due to acute chest syndrome, CAP, fluid overload and anemia. TTE on 5/2 is not suggesting pulmonary hypertension or CHF. Resolved now.   # Acute Chest Syndrome: need exchange transfusion on 4/30 and 5/4 with her oxygen level down from 20L to 1-2L since 5/5.   # Community acquired PNA  # Sickle cell pain crisis: slightly worse. HGB and retic count is stable.   # Iron overload  - s/p RBC exchange on 4/30 d/t persistent fevers and increasing O2 requirements   - s/p exchange transfusion on 5/4  - off oxygen now  - pulmonary signed off  - ID signed off and she is now cefepime, plan for 7 days    Recommendations:   - continue pain regimen per primary team, ok to increase bolus dose  - continue Lovenox 1mg/kg; will need to determine outpatient anticoagulation closer to discharge   - daily CBC with retic count  - encourage ambulation or PT    Patient was seen and plan of care was discussed with attending physician Dr. Duncan.    We will continue to follow this patient. Please don't hesitate to contact the Fellow On-Call with questions.    Juliette Miles MD, PhD  Hematology/Oncology   Pager: [phone]      I saw and evaluated the patient and performed a separate physical exam, consistent with the  one documented by Dr. Miles. I discussed the case with him/her and agree with the documentation as above, with the attending edits in bold/blue.        Eric Duncan MD  Hematologist  Division of Hematology, Oncology, and Transplantation  [facility]  [facility]  Pager: [phone]      _________________________________________________________________    Subjective & Interval History:    Jennifer is showing daily slow improvement. She got up and walked yesterday which was the most she had done in a while. She took the Rivaroxaban without concerns. She said she does not like the food here but is willing to eat it to make sure the rivaroxaban works. She is hopeful to aim for a d/c around Monday.     Physical Exam:    BP 90/52   Pulse 90   Temp 97.5  F (36.4  C) (Oral)   Resp 16   Wt 75.4 kg (166 lb 3.6 oz)   SpO2 96%   BMI 28.53 kg/m    Gen: sitting up in bed though fatigued appearing but mood is improved  CV: RRR  Pulm: decreased sounds at bases but moving air well  Neuro: Alert and answering questions appropriately.     Labs & Studies: I personally reviewed the following studies:  ROUTINE LABS (Last four results):  CMP  Recent Labs   Lab 05/08/21  0315 05/07/21  2134 05/07/21  0605 05/06/21  0723 05/05/21  0543     --  136 135 137   POTASSIUM 3.8 3.4 3.3* 3.6 4.0   CHLORIDE 107  --  106 102 104   CO2 23  --  24 26 26   ANIONGAP 6  --  6 6 7   GLC 91  --  98 93 87   BUN 4*  --  5* 3* 4*   CR 0.49*  --  0.42* 0.43* 0.47*   GFRESTIMATED >90  --  >90 >90 >90   GFRESTBLACK >90  --  >90 >90 >90   MICAH 8.4*  --  8.5 9.0 8.8   PROTTOTAL 7.3  --  7.0 7.4 6.8   ALBUMIN 2.6*  --  2.6* 2.7* 2.6*   BILITOTAL 1.1  --  1.4* 1.3 1.7*   ALKPHOS 88  --  87 106 106   AST 41  --  36 46* 57*   ALT 28  --  28 35 41     CBC  Recent Labs   Lab 05/08/21  0315 05/07/21  0605 05/06/21  0723 05/05/21  0543   WBC 10.0 13.9* 13.7* 13.2*   RBC 2.69* 2.58* 2.77* 2.75*   HGB 7.5* 7.3* 7.8* 7.8*   HCT  24.2* 22.9* 24.3* 23.9*   MCV 90 89 88 87   MCH 27.9 28.3 28.2 28.4   MCHC 31.0* 31.9 32.1 32.6   RDW 20.2* 20.0* 19.3* 17.7*   * 395 369 253     INRNo lab results found in last 7 days.    Medications list for reference:  Current Facility-Administered Medications   Medication     acetaminophen (TYLENOL) tablet 650 mg     acetaminophen (TYLENOL) tablet 650 mg     albuterol (PROAIR HFA/PROVENTIL HFA/VENTOLIN HFA) 108 (90 Base) MCG/ACT inhaler 2 puff     albuterol (PROVENTIL) neb solution 2.5 mg     ARIPiprazole (ABILIFY) tablet 2 mg     ceFEPIme (MAXIPIME) 2 g vial to attach to  ml bag for ADULTS or 50 ml bag for PEDS     celecoxib (celeBREX) capsule 100 mg     deferasirox (JADENU) tablet 1,440 mg     diclofenac (VOLTAREN) 1 % topical gel 4 g     diphenhydrAMINE (BENADRYL) capsule 25 mg     fluticasone-vilanterol (BREO ELLIPTA) 200-25 MCG/INH inhaler 1 puff     hydroxyurea (HYDREA) capsule 2,000 mg     Lidocaine (LIDOCARE) 4 % Patch 1 patch    And     lidocaine patch in PLACE     lidocaine (LMX4) cream     lidocaine 1 % 0.1-1 mL     melatonin tablet 1 mg     morphine  PCA 1 mg/mL OPIOID TOLERANT     ondansetron (ZOFRAN) injection 4-8 mg     Patient is already receiving anticoagulation with heparin, enoxaparin (LOVENOX), warfarin (COUMADIN)  or other anticoagulant medication     polyethylene glycol (MIRALAX) Packet 17 g     rivaroxaban ANTICOAGULANT (XARELTO) tablet 15 mg     senna-docusate (SENOKOT-S/PERICOLACE) 8.6-50 MG per tablet 1 tablet    Or     senna-docusate (SENOKOT-S/PERICOLACE) 8.6-50 MG per tablet 2 tablet     sertraline (ZOLOFT) tablet 200 mg     sodium chloride (PF) 0.9% PF flush 3 mL     sodium chloride (PF) 0.9% PF flush 3 mL     sodium chloride 0.9% infusion

## 2021-05-09 LAB
ALBUMIN SERPL-MCNC: 2.6 G/DL (ref 3.4–5)
ALP SERPL-CCNC: 87 U/L (ref 40–150)
ALT SERPL W P-5'-P-CCNC: 38 U/L (ref 0–50)
ANION GAP SERPL CALCULATED.3IONS-SCNC: 6 MMOL/L (ref 3–14)
AST SERPL W P-5'-P-CCNC: 70 U/L (ref 0–45)
BILIRUB SERPL-MCNC: 1.1 MG/DL (ref 0.2–1.3)
BUN SERPL-MCNC: 5 MG/DL (ref 7–30)
CALCIUM SERPL-MCNC: 9 MG/DL (ref 8.5–10.1)
CHLORIDE SERPL-SCNC: 108 MMOL/L (ref 94–109)
CO2 SERPL-SCNC: 23 MMOL/L (ref 20–32)
CREAT SERPL-MCNC: 0.43 MG/DL (ref 0.52–1.04)
CRP SERPL-MCNC: 120 MG/L (ref 0–8)
ERYTHROCYTE [DISTWIDTH] IN BLOOD BY AUTOMATED COUNT: 19.9 % (ref 10–15)
GFR SERPL CREATININE-BSD FRML MDRD: >90 ML/MIN/{1.73_M2}
GLUCOSE SERPL-MCNC: 74 MG/DL (ref 70–99)
HCT VFR BLD AUTO: 26.8 % (ref 35–47)
HGB BLD-MCNC: 8.5 G/DL (ref 11.7–15.7)
MCH RBC QN AUTO: 29.2 PG (ref 26.5–33)
MCHC RBC AUTO-ENTMCNC: 31.7 G/DL (ref 31.5–36.5)
MCV RBC AUTO: 92 FL (ref 78–100)
PLATELET # BLD AUTO: 601 10E9/L (ref 150–450)
POTASSIUM SERPL-SCNC: 3.9 MMOL/L (ref 3.4–5.3)
PROT SERPL-MCNC: 7.5 G/DL (ref 6.8–8.8)
RBC # BLD AUTO: 2.91 10E12/L (ref 3.8–5.2)
RETICS # AUTO: 387 10E9/L (ref 25–95)
RETICS/RBC NFR AUTO: 13.3 % (ref 0.5–2)
SODIUM SERPL-SCNC: 137 MMOL/L (ref 133–144)
WBC # BLD AUTO: 9.2 10E9/L (ref 4–11)

## 2021-05-09 PROCEDURE — 36415 COLL VENOUS BLD VENIPUNCTURE: CPT | Performed by: PHYSICIAN ASSISTANT

## 2021-05-09 PROCEDURE — 99232 SBSQ HOSP IP/OBS MODERATE 35: CPT | Performed by: PEDIATRICS

## 2021-05-09 PROCEDURE — 250N000013 HC RX MED GY IP 250 OP 250 PS 637: Performed by: PEDIATRICS

## 2021-05-09 PROCEDURE — 99207 PR APP CREDIT; MD BILLING SHARED VISIT: CPT | Performed by: PHYSICIAN ASSISTANT

## 2021-05-09 PROCEDURE — 99233 SBSQ HOSP IP/OBS HIGH 50: CPT | Performed by: INTERNAL MEDICINE

## 2021-05-09 PROCEDURE — 86140 C-REACTIVE PROTEIN: CPT | Performed by: PHYSICIAN ASSISTANT

## 2021-05-09 PROCEDURE — 85027 COMPLETE CBC AUTOMATED: CPT | Performed by: PHYSICIAN ASSISTANT

## 2021-05-09 PROCEDURE — 250N000011 HC RX IP 250 OP 636: Performed by: PHYSICIAN ASSISTANT

## 2021-05-09 PROCEDURE — 250N000013 HC RX MED GY IP 250 OP 250 PS 637: Performed by: PHYSICIAN ASSISTANT

## 2021-05-09 PROCEDURE — 85045 AUTOMATED RETICULOCYTE COUNT: CPT | Performed by: PHYSICIAN ASSISTANT

## 2021-05-09 PROCEDURE — 120N000002 HC R&B MED SURG/OB UMMC

## 2021-05-09 PROCEDURE — 80053 COMPREHEN METABOLIC PANEL: CPT | Performed by: PHYSICIAN ASSISTANT

## 2021-05-09 RX ADMIN — HYDROXYUREA 2000 MG: 500 CAPSULE ORAL at 09:16

## 2021-05-09 RX ADMIN — Medication: at 16:34

## 2021-05-09 RX ADMIN — RIVAROXABAN 15 MG: 15 TABLET, FILM COATED ORAL at 09:16

## 2021-05-09 RX ADMIN — ACETAMINOPHEN 650 MG: 325 TABLET, FILM COATED ORAL at 01:53

## 2021-05-09 RX ADMIN — RIVAROXABAN 15 MG: 15 TABLET, FILM COATED ORAL at 17:38

## 2021-05-09 RX ADMIN — FLUTICASONE FUROATE AND VILANTEROL TRIFENATATE 1 PUFF: 200; 25 POWDER RESPIRATORY (INHALATION) at 20:27

## 2021-05-09 RX ADMIN — CEFEPIME HYDROCHLORIDE 2 G: 2 INJECTION, POWDER, FOR SOLUTION INTRAVENOUS at 01:57

## 2021-05-09 RX ADMIN — ACETAMINOPHEN 650 MG: 325 TABLET, FILM COATED ORAL at 20:27

## 2021-05-09 RX ADMIN — CELECOXIB 100 MG: 100 CAPSULE ORAL at 13:00

## 2021-05-09 RX ADMIN — SERTRALINE HYDROCHLORIDE 200 MG: 100 TABLET ORAL at 09:16

## 2021-05-09 RX ADMIN — ACETAMINOPHEN 650 MG: 325 TABLET, FILM COATED ORAL at 13:00

## 2021-05-09 RX ADMIN — ACETAMINOPHEN 650 MG: 325 TABLET, FILM COATED ORAL at 09:16

## 2021-05-09 RX ADMIN — ARIPIPRAZOLE 2 MG: 2 TABLET ORAL at 09:16

## 2021-05-09 RX ADMIN — CEFEPIME HYDROCHLORIDE 2 G: 2 INJECTION, POWDER, FOR SOLUTION INTRAVENOUS at 20:29

## 2021-05-09 RX ADMIN — Medication: at 09:35

## 2021-05-09 RX ADMIN — CEFEPIME HYDROCHLORIDE 2 G: 2 INJECTION, POWDER, FOR SOLUTION INTRAVENOUS at 13:00

## 2021-05-09 ASSESSMENT — ACTIVITIES OF DAILY LIVING (ADL)
ADLS_ACUITY_SCORE: 14

## 2021-05-09 NOTE — PLAN OF CARE
ASSUMED CARES: [id number].  STATUS: Pt admitted 4/26 for Sickle Cell Crisis. Hx CVA- R sided hemiparesis. +PNA  NEURO: A/o x 4. Flat Affect- Pleasent  VS: Tachy (110's). All other VSS on RA.   ACTIVITY: Up to BSC with SBA.   PAIN: Denies pain- Morphine PCA with cont and on demand - 5.6 mg hourly max.   CARDIAC: Tachy- No C/o CP.   RESP: +ORTEGA.   GI/: Voiding via commode. No BM during shift.   DIET: Regular.   SKIN: Intact. Small lipoma like nodule noted near previous angio site (L groin) - not painful or reddened per pt.   LDA'S: R PIV x 2- One infusing PCA with NS TKO and other TKO for IV abx. L Port- not accessed.   LABS: K+ 3.8.   CHANGES THIS SHIFT: Pt able to sleep in between cares.   POC: Cont with POC. Plan for possible discharge early next week.  Call light within reach.

## 2021-05-09 NOTE — PROGRESS NOTES
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     Since admission she had worsening PE with low Apixaban level so was switched to Lovenox. She also had exchange transfusion for suspected acute chest syndrome on 4/30. Her Hgb remains the same but her respiratory failure is not improving. She was treated for pulmonary edema and now her chest CT on 5/3/21 shows diffuse groundglass and consolidative opacities throughout the lungs, most pronounced in the dependent aspects of the lung fields. Findings are suspicious for infection, including atypical pneumonia. Less likely to represent cardiogenic pulmonary edema given the lack of pleural effusion. Also consider ongoing acute chest syndrome. So another exchange transfusion was done on 5/4 after discussion with pulmonary team, Dr. Hernandez and Dr. Duncan. Her oxygen requirement dramatically improved from 20L to 1-2L on 5/5/21 and she is off oxygen on 5/6.    Continue downtrending pain regimen. She was open to dropping continuous rate down tomorrow but was open to dropping the bumps 50% further today      # Worsening PE  - 4/27 V/Q scan shows bilateral subsegmental perfusion defects, left greater  than right, consistent with pulmonary emboli. Overall findings have  worsened since the comparison  perfusion scan on 2/1/2021, particularly  within the left lung.   - She was admitted 2/1/21-2/3/21 with a new PE, started on Rivaroxaban, switched to Eliquis 3/25/21 with finding of RUE DVT (? rivaroxaban failure). She only held Eliquis for 1 day for Left Port Removal and Left Port Placement on 4/21.   -She states that she was compliant with all Eliquis so it was continued since admission.  We checked her Apixaban level 4/29 and it was 29, much lower than expected, presumably due to poor absorption.  Switched to Lovenox 4/29 PM.  - On rivaroxaban. Encourage heavy meals when taking. Will check level on Monday    # Acute respiratory failure with hypoxia due to acute chest syndrome, CAP, fluid overload and anemia. TTE on 5/2 is not suggesting pulmonary hypertension or CHF. Resolved now.   # Acute Chest Syndrome: need exchange transfusion on 4/30 and 5/4 with her oxygen level down from 20L to 1-2L since 5/5.   # Community acquired PNA  # Sickle cell pain crisis: slightly worse. HGB and retic count is stable.   # Iron overload  - s/p RBC exchange on 4/30 d/t persistent fevers and increasing O2 requirements   - s/p exchange transfusion on 5/4  - off oxygen now    Recommendations:   - Rivaroxaban level in AM  - No need to trend labs any longer  - encourage ambulation or PT      We will continue to follow this patient. Please don't hesitate to contact the Fellow On-Call with questions.    Eric Duncan MD  Hematologist  Division of Hematology, Oncology, and Transplantation  [facility]  [facility]  Pager: [phone]      _________________________________________________________________    Subjective & Interval History:    Jennifer feels much better in the past 24 hr. Not ready to go yet but feeling closer. She did not walk yesterday but did get up to the chair. No issues with rivaroxaban. She thinks d/c around Monday/Tuesday is quite possible     Physical Exam:    /69 (BP  Location: Left arm)   Pulse 87   Temp 98  F (36.7  C) (Oral)   Resp 16   Wt 75.4 kg (166 lb 3.6 oz)   SpO2 97%   BMI 28.53 kg/m    Gen: lying in bed, more alert today and in a better mood  CV: RRR  Pulm: improved sounds at bases but moving air well  Neuro: Alert and answering questions appropriately.  Skin: No bruising     Labs & Studies: I personally reviewed the following studies:  ROUTINE LABS (Last four results):  CMP  Recent Labs   Lab 05/09/21  0556 05/08/21  0315 05/07/21  2134 05/07/21  0605 05/06/21  0723    136  --  136 135   POTASSIUM 3.9 3.8 3.4 3.3* 3.6   CHLORIDE 108 107  --  106 102   CO2 23 23  --  24 26   ANIONGAP 6 6  --  6 6   GLC 74 91  --  98 93   BUN 5* 4*  --  5* 3*   CR 0.43* 0.49*  --  0.42* 0.43*   GFRESTIMATED >90 >90  --  >90 >90   GFRESTBLACK >90 >90  --  >90 >90   MICAH 9.0 8.4*  --  8.5 9.0   PROTTOTAL 7.5 7.3  --  7.0 7.4   ALBUMIN 2.6* 2.6*  --  2.6* 2.7*   BILITOTAL 1.1 1.1  --  1.4* 1.3   ALKPHOS 87 88  --  87 106   AST 70* 41  --  36 46*   ALT 38 28  --  28 35     CBC  Recent Labs   Lab 05/09/21  0556 05/08/21  0315 05/07/21  0605 05/06/21  0723   WBC 9.2 10.0 13.9* 13.7*   RBC 2.91* 2.69* 2.58* 2.77*   HGB 8.5* 7.5* 7.3* 7.8*   HCT 26.8* 24.2* 22.9* 24.3*   MCV 92 90 89 88   MCH 29.2 27.9 28.3 28.2   MCHC 31.7 31.0* 31.9 32.1   RDW 19.9* 20.2* 20.0* 19.3*   * 515* 395 369     INRNo lab results found in last 7 days.    Medications list for reference:  Current Facility-Administered Medications   Medication     acetaminophen (TYLENOL) tablet 650 mg     acetaminophen (TYLENOL) tablet 650 mg     albuterol (PROAIR HFA/PROVENTIL HFA/VENTOLIN HFA) 108 (90 Base) MCG/ACT inhaler 2 puff     albuterol (PROVENTIL) neb solution 2.5 mg     ARIPiprazole (ABILIFY) tablet 2 mg     ceFEPIme (MAXIPIME) 2 g vial to attach to  ml bag for ADULTS or 50 ml bag for PEDS     celecoxib (celeBREX) capsule 100 mg     deferasirox (JADENU) tablet 1,440 mg     diclofenac (VOLTAREN) 1 %  topical gel 4 g     diphenhydrAMINE (BENADRYL) capsule 25 mg     fluticasone-vilanterol (BREO ELLIPTA) 200-25 MCG/INH inhaler 1 puff     hydroxyurea (HYDREA) capsule 2,000 mg     Lidocaine (LIDOCARE) 4 % Patch 1 patch    And     lidocaine patch in PLACE     lidocaine (LMX4) cream     lidocaine 1 % 0.1-1 mL     melatonin tablet 1 mg     morphine  PCA 1 mg/mL OPIOID TOLERANT     ondansetron (ZOFRAN) injection 4-8 mg     Patient is already receiving anticoagulation with heparin, enoxaparin (LOVENOX), warfarin (COUMADIN)  or other anticoagulant medication     polyethylene glycol (MIRALAX) Packet 17 g     rivaroxaban ANTICOAGULANT (XARELTO) tablet 15 mg     senna-docusate (SENOKOT-S/PERICOLACE) 8.6-50 MG per tablet 1 tablet    Or     senna-docusate (SENOKOT-S/PERICOLACE) 8.6-50 MG per tablet 2 tablet     sertraline (ZOLOFT) tablet 200 mg     sodium chloride (PF) 0.9% PF flush 3 mL     sodium chloride (PF) 0.9% PF flush 3 mL     sodium chloride 0.9% infusion

## 2021-05-09 NOTE — PLAN OF CARE
Cares 0401-4149. Alert, oriented. VSS on RA. Up SBA to commode, voids spontaneously. No BM this shift. R PIV x2 infusing TKO/NS for morphine, port not accessed. Tolerating reg diet. No acute events. Continue to monitor.

## 2021-05-09 NOTE — PROGRESS NOTES
[facility]    Medicine Progress Note - Hospitalist Service, Gold 4       Date of Admission:  4/26/2021  Assessment & Plan       Jennifer Cervantes is a 20 yo F with PMHx of sickle cell anemia c/b CVA with residual RUE hemiparesis, and PE, iron overload 2/2 frequent transfusions, asthma, depression, and anxiety who presented with worsening pain, admitted on 4/26/2021 for acute sickle cell pain crisis, complicated by acute hypoxic respiratory failure due to acute chest syndrome.     #Acute sickle cell crisis, improving   #Sepsis from Acute chest syndrome, resolved   #Acute respiratory failure, resolved    HgbSS c/b frequent pain crisis, hx of CVA with RUE hemiparesis, iron overload, and DVT/PE. Presented with pain over right side on ribs and back, with elevated retic count consistent with sickle cell crisis, treated with supportive care and Morphine PCA. Hospital course complicated by hypoxia with increased O2 requirement, from 2-3L initially up to HFNC, and fevers, CXR with RLL concerning for pneumonia, with leukocytosis meeting sepsis criteria. Started on Ceftriaxone/Azithromycin. Lactic flat. COVID negative x2. TTE normal. Concern for acute chest s/p exchange transfusion on 4/30 with minimal improvement in oxygen requirements, but did improve fever curve.  With persistent hypoxia concern about another acute inflammatory or infectious process. Procalcitonin rising to 0.44. CT chest w/o contrast with diffuse GGO and consolidated opacities concerning for infection.  ID and pulmonary consulted with extensive infectious work up with negative RVP, strep pneumo and legionella urinary antigen, histo serum and urinary antigen, blasto urinary antigen, cryptococcus serum, Beta D glucan, galactomannan, CMV, VZV and HSV 1&2. Remaining studies still pending below.  Broadened antibiotics to Cefepime and Vanco (Vanco now discontinued after negative MRSA swab). Hematology recommended repeat  transfusion exchanged on 5/4, patient with significant improvement in respiratory status, now on RA. Pain improving, weaning down on pain medication.     -Appreciated ID and pulmonary recs who have since signed off  -Continue Cefepime 2g Q8H for 7 day course  (End date 5/11/2021)  -Follow up repeat blood cultures on 5/7 NTGTD  -Follow up blasto antigen serum, coccidioides serum and urine antigen, Fungal ab.   -Hematology following, appreciate recs   -Pain management:    -Morphine PCA. Continuous rate 2.0 mg/hr, decrease bolus dose 0.8 mg Q20 mins   -Celebrex 100 mg BID, Tylenol 650 mg Q6H PRN, voltaren gel QID PRN.   -Trend CBC, CRP and reticulocyte count  -Bowel regimen, miralax daily, senna-colace   -Benadryl PRN    -Continue hydroxyurea 2000 mg daily   -Acapella and pulmonary toilet   -Encourage ambulation     #Worsening PE - Admitted on 2/1-2/3/2021 with a new PE and started on DOAC (initally on rivaroxaban and later switched to Eliquis after finding RUE DVT for possible rivaroxaban failure). Patient reports compliance with anticoagulation. On this admission, pt found to have bilateral subsegmental perfusion defects on VQ scan, worse compared to prior VQ scan on 2/1.   -Hematology consulted and following   -Continue Xarelto 15 mg BID x 21 days followed by 20 mg daily with food  -Rivaroxaban 10a level on 5/10 AM    #Seroma? Although the CT chest from 5/3 also showed howed walled off fluid collection in the superior left chest wall port, this was more suggestive of seroma (per CT report). No signs of inflammation around the port and blood cultures are all negative so far.   -Recommend repeat US of the area on 5/10 (one week after the previous CT) to assure that it is not an evolving process     #Thrombocytosis - Likely reactive from recent infection and acute chest syndrome.   -Trend CBC     #Hx of iron overload - Continue PTA Deferasirox 1440 mg daily     #Asthma - Continue PTA Symbicort (formulary equivalent  Breo Ellipta) and albuterol     #Anxiety  #Depression  - Continue PTA Sertraline 200 mg daily and Abilify 2 mg daily        Diet: Regular Diet Adult  Snacks/Supplements Adult: Ensure Max Protein; Between Meals  Snacks/Supplements Adult: Ensure Max Protein; With Meals    DVT Prophylaxis: Xarelto   Lopez Catheter: not present  Code Status: Full Code           Disposition Plan   Expected discharge: 2 - 3 days, recommended to prior living arrangement once adequate pain management/ tolerating PO medications.  Entered: Chhaya Vela PA-C 05/09/2021, 10:11 AM       The patient's care was discussed with the Attending Physician, Dr. Ginna Valle , Bedside Nurse and Patient.    Chhaya Vela PA-C  Hospitalist Service, [address]  Contact information available via [facility] Paging/Directory  Please see sign in/sign out for up to date coverage information  ______________________________________________________________________    Interval History   Patient states she is feeling well this morning. Rates her pain at 8/10 this morning. Feels her pain is controlled on the PCA. Denies any F/C, CP, SOB, cough, N/V/D, and abdominal pain.     Data reviewed today: I reviewed all medications, new labs and imaging results over the last 24 hours.     Physical Exam   Vital Signs: Temp: 98  F (36.7  C) Temp src: Oral BP: 119/69 Pulse: 87   Resp: 16 SpO2: 97 % O2 Device: None (Room air)    Weight: 166 lbs 3.63 oz  GENERAL: Alert and awake. NAD. Pleasant and conversational.   HEENT: AT/NC. Anicteric sclera. Mucous membranes moist   CARDIOVASCULAR: RRR. S1, S2. No murmurs, rubs, or gallops.   RESPIRATORY: Effort normal on RA. Faint rales in RLL, with remaining lung fields CTA. No rales, rhonchi or wheezing. Respirations unlabored   GI: Abdomen soft, non-tender abdomen without rebound or guarding, normoactive bowel sounds present  EXTREMITIES: No peripheral edema. No calf asymmetry,  erythema, or tenderness.   NEUROLOGICAL:  CN II-XII grossly intact. RUE hemiparesis.   SKIN: Intact. Warm and dry.  No jaundice.     Data   Recent Labs   Lab 05/09/21  0556 05/08/21  0315 05/07/21  2134 05/07/21  0605   WBC 9.2 10.0  --  13.9*   HGB 8.5* 7.5*  --  7.3*   MCV 92 90  --  89   * 515*  --  395    136  --  136   POTASSIUM 3.9 3.8 3.4 3.3*   CHLORIDE 108 107  --  106   CO2 23 23  --  24   BUN 5* 4*  --  5*   CR 0.43* 0.49*  --  0.42*   ANIONGAP 6 6  --  6   MICAH 9.0 8.4*  --  8.5   GLC 74 91  --  98   ALBUMIN 2.6* 2.6*  --  2.6*   PROTTOTAL 7.5 7.3  --  7.0   BILITOTAL 1.1 1.1  --  1.4*   ALKPHOS 87 88  --  87   ALT 38 28  --  28   AST 70* 41  --  36     No results found for this or any previous visit (from the past 24 hour(s)).  Medications     morphine       - MEDICATION INSTRUCTIONS -       sodium chloride 10 mL/hr at 05/08/21 2300       acetaminophen  650 mg Oral Q4H     ARIPiprazole  2 mg Oral Daily     ceFEPIme (MAXIPIME) IV  2 g Intravenous Q8H     celecoxib  100 mg Oral BID     deferasirox  1,440 mg Oral QPM     fluticasone-vilanterol  1 puff Inhalation QPM     hydroxyurea  2,000 mg Oral Daily     lidocaine  1 patch Transdermal Q24h    And     lidocaine   Transdermal Q8H     polyethylene glycol  17 g Oral Daily     rivaroxaban ANTICOAGULANT  15 mg Oral BID w/meals     senna-docusate  1 tablet Oral BID    Or     senna-docusate  2 tablet Oral BID     sertraline  200 mg Oral Daily     sodium chloride (PF)  3 mL Intracatheter Q8H

## 2021-05-10 ENCOUNTER — APPOINTMENT (OUTPATIENT)
Dept: PHYSICAL THERAPY | Facility: CLINIC | Age: 21
DRG: 811 | End: 2021-05-10
Attending: INTERNAL MEDICINE
Payer: COMMERCIAL

## 2021-05-10 ENCOUNTER — HOME INFUSION (PRE-WILLOW HOME INFUSION) (OUTPATIENT)
Dept: PHARMACY | Facility: CLINIC | Age: 21
End: 2021-05-10

## 2021-05-10 ENCOUNTER — APPOINTMENT (OUTPATIENT)
Dept: ULTRASOUND IMAGING | Facility: CLINIC | Age: 21
DRG: 811 | End: 2021-05-10
Attending: PHYSICIAN ASSISTANT
Payer: COMMERCIAL

## 2021-05-10 LAB
ALBUMIN SERPL-MCNC: 2.6 G/DL (ref 3.4–5)
ALP SERPL-CCNC: 86 U/L (ref 40–150)
ALT SERPL W P-5'-P-CCNC: 43 U/L (ref 0–50)
ANION GAP SERPL CALCULATED.3IONS-SCNC: 6 MMOL/L (ref 3–14)
ASPERGILLUS AB SER QL ID: NORMAL
AST SERPL W P-5'-P-CCNC: 75 U/L (ref 0–45)
B DERMAT AB SER QL ID: NORMAL
BILIRUB SERPL-MCNC: 0.9 MG/DL (ref 0.2–1.3)
BUN SERPL-MCNC: 5 MG/DL (ref 7–30)
CALCIUM SERPL-MCNC: 8.9 MG/DL (ref 8.5–10.1)
CHLORIDE SERPL-SCNC: 108 MMOL/L (ref 94–109)
CO2 SERPL-SCNC: 23 MMOL/L (ref 20–32)
COCCIDIOIDES AB SPEC QL ID: NORMAL
CREAT SERPL-MCNC: 0.47 MG/DL (ref 0.52–1.04)
CRP SERPL-MCNC: 76 MG/L (ref 0–8)
ERYTHROCYTE [DISTWIDTH] IN BLOOD BY AUTOMATED COUNT: 19.5 % (ref 10–15)
GFR SERPL CREATININE-BSD FRML MDRD: >90 ML/MIN/{1.73_M2}
GLUCOSE SERPL-MCNC: 83 MG/DL (ref 70–99)
H CAPSUL AB TITR SER ID: NORMAL {TITER}
HCT VFR BLD AUTO: 26.3 % (ref 35–47)
HGB BLD-MCNC: 8.2 G/DL (ref 11.7–15.7)
MCH RBC QN AUTO: 28.3 PG (ref 26.5–33)
MCHC RBC AUTO-ENTMCNC: 31.2 G/DL (ref 31.5–36.5)
MCV RBC AUTO: 91 FL (ref 78–100)
PLATELET # BLD AUTO: 850 10E9/L (ref 150–450)
POTASSIUM SERPL-SCNC: 3.9 MMOL/L (ref 3.4–5.3)
PROT SERPL-MCNC: 7.7 G/DL (ref 6.8–8.8)
RBC # BLD AUTO: 2.9 10E12/L (ref 3.8–5.2)
RETICS # AUTO: 381.9 10E9/L (ref 25–95)
RETICS/RBC NFR AUTO: 13.2 % (ref 0.5–2)
RIVAROXABAN PPP CHRO-MCNC: NORMAL NG/ML
RIVAROXABAN PPP CHRO-MCNC: NORMAL NG/ML
SODIUM SERPL-SCNC: 136 MMOL/L (ref 133–144)
WBC # BLD AUTO: 7.6 10E9/L (ref 4–11)

## 2021-05-10 PROCEDURE — 250N000013 HC RX MED GY IP 250 OP 250 PS 637: Performed by: PHYSICIAN ASSISTANT

## 2021-05-10 PROCEDURE — 80299 QUANTITATIVE ASSAY DRUG: CPT | Performed by: PHYSICIAN ASSISTANT

## 2021-05-10 PROCEDURE — 120N000002 HC R&B MED SURG/OB UMMC

## 2021-05-10 PROCEDURE — 250N000011 HC RX IP 250 OP 636: Performed by: INTERNAL MEDICINE

## 2021-05-10 PROCEDURE — 76604 US EXAM CHEST: CPT | Mod: 26 | Performed by: RADIOLOGY

## 2021-05-10 PROCEDURE — 250N000013 HC RX MED GY IP 250 OP 250 PS 637: Performed by: PEDIATRICS

## 2021-05-10 PROCEDURE — 36415 COLL VENOUS BLD VENIPUNCTURE: CPT | Performed by: PEDIATRICS

## 2021-05-10 PROCEDURE — 76882 US LMTD JT/FCL EVL NVASC XTR: CPT | Mod: LT

## 2021-05-10 PROCEDURE — 99232 SBSQ HOSP IP/OBS MODERATE 35: CPT | Performed by: PEDIATRICS

## 2021-05-10 PROCEDURE — 36415 COLL VENOUS BLD VENIPUNCTURE: CPT | Performed by: PHYSICIAN ASSISTANT

## 2021-05-10 PROCEDURE — 85045 AUTOMATED RETICULOCYTE COUNT: CPT | Performed by: PHYSICIAN ASSISTANT

## 2021-05-10 PROCEDURE — 99207 PR APP CREDIT; MD BILLING SHARED VISIT: CPT | Performed by: NURSE PRACTITIONER

## 2021-05-10 PROCEDURE — 80053 COMPREHEN METABOLIC PANEL: CPT | Performed by: PHYSICIAN ASSISTANT

## 2021-05-10 PROCEDURE — 250N000011 HC RX IP 250 OP 636: Performed by: PHYSICIAN ASSISTANT

## 2021-05-10 PROCEDURE — 250N000013 HC RX MED GY IP 250 OP 250 PS 637: Performed by: INTERNAL MEDICINE

## 2021-05-10 PROCEDURE — 86140 C-REACTIVE PROTEIN: CPT | Performed by: PHYSICIAN ASSISTANT

## 2021-05-10 PROCEDURE — 99233 SBSQ HOSP IP/OBS HIGH 50: CPT | Performed by: INTERNAL MEDICINE

## 2021-05-10 PROCEDURE — 97530 THERAPEUTIC ACTIVITIES: CPT | Mod: GP

## 2021-05-10 PROCEDURE — 85027 COMPLETE CBC AUTOMATED: CPT | Performed by: PHYSICIAN ASSISTANT

## 2021-05-10 RX ORDER — OXYCODONE HYDROCHLORIDE 10 MG/1
10 TABLET ORAL EVERY 4 HOURS PRN
Status: DISCONTINUED | OUTPATIENT
Start: 2021-05-10 | End: 2021-05-11 | Stop reason: HOSPADM

## 2021-05-10 RX ORDER — METHOCARBAMOL 500 MG/1
500 TABLET, FILM COATED ORAL 4 TIMES DAILY
Status: DISCONTINUED | OUTPATIENT
Start: 2021-05-10 | End: 2021-05-11 | Stop reason: HOSPADM

## 2021-05-10 RX ADMIN — CEFEPIME HYDROCHLORIDE 2 G: 2 INJECTION, POWDER, FOR SOLUTION INTRAVENOUS at 11:15

## 2021-05-10 RX ADMIN — Medication: at 02:09

## 2021-05-10 RX ADMIN — RIVAROXABAN 15 MG: 15 TABLET, FILM COATED ORAL at 17:45

## 2021-05-10 RX ADMIN — ACETAMINOPHEN 650 MG: 325 TABLET, FILM COATED ORAL at 00:19

## 2021-05-10 RX ADMIN — SERTRALINE HYDROCHLORIDE 200 MG: 100 TABLET ORAL at 10:21

## 2021-05-10 RX ADMIN — METHOCARBAMOL 500 MG: 500 TABLET, FILM COATED ORAL at 15:50

## 2021-05-10 RX ADMIN — Medication: at 11:49

## 2021-05-10 RX ADMIN — CELECOXIB 100 MG: 100 CAPSULE ORAL at 00:19

## 2021-05-10 RX ADMIN — DEFERASIROX 1440 MG: 360 TABLET ORAL at 19:46

## 2021-05-10 RX ADMIN — HYDROXYUREA 2000 MG: 500 CAPSULE ORAL at 10:21

## 2021-05-10 RX ADMIN — ACETAMINOPHEN 650 MG: 325 TABLET, FILM COATED ORAL at 19:46

## 2021-05-10 RX ADMIN — ACETAMINOPHEN 650 MG: 325 TABLET, FILM COATED ORAL at 15:50

## 2021-05-10 RX ADMIN — ACETAMINOPHEN 650 MG: 325 TABLET, FILM COATED ORAL at 10:17

## 2021-05-10 RX ADMIN — METHOCARBAMOL 500 MG: 500 TABLET, FILM COATED ORAL at 11:50

## 2021-05-10 RX ADMIN — FLUTICASONE FUROATE AND VILANTEROL TRIFENATATE 1 PUFF: 200; 25 POWDER RESPIRATORY (INHALATION) at 19:45

## 2021-05-10 RX ADMIN — ACETAMINOPHEN 650 MG: 325 TABLET, FILM COATED ORAL at 11:14

## 2021-05-10 RX ADMIN — METHOCARBAMOL 500 MG: 500 TABLET, FILM COATED ORAL at 19:46

## 2021-05-10 RX ADMIN — CEFEPIME HYDROCHLORIDE 2 G: 2 INJECTION, POWDER, FOR SOLUTION INTRAVENOUS at 19:45

## 2021-05-10 RX ADMIN — CELECOXIB 100 MG: 100 CAPSULE ORAL at 11:14

## 2021-05-10 RX ADMIN — ARIPIPRAZOLE 2 MG: 2 TABLET ORAL at 10:21

## 2021-05-10 RX ADMIN — RIVAROXABAN 15 MG: 15 TABLET, FILM COATED ORAL at 10:21

## 2021-05-10 RX ADMIN — CEFEPIME HYDROCHLORIDE 2 G: 2 INJECTION, POWDER, FOR SOLUTION INTRAVENOUS at 03:42

## 2021-05-10 ASSESSMENT — ACTIVITIES OF DAILY LIVING (ADL)
ADLS_ACUITY_SCORE: 14

## 2021-05-10 NOTE — PLAN OF CARE
ASSUMED CARES: [id number].  STATUS: Pt admitted 4/26 for Sickle Cell Crisis. Hx CVA- R sided hemiparesis. +PNA  NEURO: A/o x 4. Flat Affect- Pleasent  VS: Tachy (110's). All other VSS on RA.   ACTIVITY: Up IND to BSC.   PAIN: Some mild pain in low back - Morphine PCA with cont and on demand - 3.9mg/hourly max- Currently tapering dose daily- pt tolerating taper currently.   CARDIAC: Tachy- No C/o CP.   RESP: +ORTEGA.   GI/: Voiding via commode. BM x 2.    DIET: Regular.   SKIN: Intact. Small lipoma like nodule noted near previous line site (L groin) - not painful or reddened per pt.   LDA'S: R PIV x 2- One infusing PCA with NS TKO and other TKO for IV abx. L Port- not accessed.   LABS: K+ 3.9.   CHANGES THIS SHIFT: Pt able to sleep in between cares.   POC: Cont with POC. Plan for possible discharge early next week.  Call light within reach.

## 2021-05-10 NOTE — PROGRESS NOTES
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      Hospital course was complicated by finding of worsening PE in setting of low Apixaban level, acute hypoxic respiratory failure, pneumonia, and acute chest syndrome s/p exchange transfusion on 4/30 and 5/4. After second exchange, her oxygen requirement dramatically improved from 20L to 1-2L on 5/5/21 and she was off oxygen on 5/6.    # PE with worsening noted on 4/27 V/Q scan  She was admitted 2/1/21-2/3/21 with a new PE, started on Rivaroxaban, switched to Eliquis 3/25/21 with finding of RUE DVT (? rivaroxaban failure). She only held Eliquis for 1 day for Left Port Removal and Left Port Placement on 4/21. However,  4/27 V/Q scan showed bilateral subsegmental perfusion defects, left greater than right, consistent with pulmonary emboli. Overall findings had worsened since the comparison perfusion scan on 2/1/2021, particularly within the left lung. Pt reported having been compliant with Eliquis so it was continued at time of admission. Apixaban level was checked on 4/29 and returned at 29, much lower than expected, presumably due to poor absorption. Anticoagulation was switched to Lovenox 4/29 PM. Subsequently changed to rivaroxaban (x 5/7 PM). Encourage to take with meals.   - check rivoraxaban level today (ordered 3-4 hours after 5/10 AM dose). Of note, dosing has been inconsistent; discussed need for more consistent dosing with pt and  primary team today     # Acute hypoxic respiratory failure, multifactorial (acute chest syndrome, CAP, fluid overload, anemia). Resolved.   - TTE on 5/2 is not suggesting pulmonary hypertension or CHF.   - antibiotics per primary team    # Acute Chest Syndrome s/p exchange transfusion on 4/30 and 5/4. Resolved.   # Sickle cell pain crisis  # Iron overload  - s/p RBC exchange transfusion on 4/30 and 5/4. She remains off supplemental O2 at present.   - continue Hydrea  - continue deferasirox   - pain control per primary team, agree with further taper down/off PCA as able and transition to PRN oxycodone as well as adjunct pain control (Tylenol, celebrex, methocarbamol)    Recommendations:   - continue pain control per primary team, agree with further taper down/off PCA as able and transition to PRN oxycodone as well as adjunct pain control (Tylenol, celebrex, lidocaine patches, methocarbamol)  - check rivaroxaban level today (3-4 hours after 5/10 AM dose, ordered for you). Of note, dosing has been inconsistent; discussed need for more consistent dose timing with pt and primary team today    Follow-up:  - pt currently has follow-up in [facility] with ANDREAS on 5/12. Anticipate pt can discharge prior to this date so that she can keep this visit.       Patient was seen and plan of care was discussed with attending physician, Dr. Duncan.    Thank you for the opportunity to partake in this patients plan of care. Please do not hesitate to page with questions. We will continue to follow.    Jennifer Obando PA-C   Hematology/Oncology   Pager: [phone]  ___________________________________________________________________    Interval History:    No acute events overnight, afebrile. Has been tolerating the taper down on her bolus and basal rate PCA doses. Agreeable to further taper today with goal of eventual transition to PRN dosed opioids. Her rivaroxaban dosing has been inconsistent, so we discussed need for more consistent  dosing with pt and primary team today. She denies HA, SOB or difficulty breathing, chest pain, chest pressure, abdominal pain, nausea, or change in bowels. She is hopeful for discharge tomorrow.       Physical Exam:    BP 98/54 (BP Location: Left arm)   Pulse 83   Temp 96.3  F (35.7  C) (Oral)   Resp 18   Wt 72.6 kg (160 lb)   SpO2 98%   BMI 27.46 kg/m      General: lying in bed, no acute distress  HEENT: sclera anicteric, MMM  CV: RRR  Resp: Breathing even and non-labored on RA. Lungs CTAB, no wheezing/crackles  GI: Normal BS. Abd soft, non-tender, non-distended.   MSK: Known R hand contracture. No LE edema.   Skin: no rashes on limited exam  Neuro: Alert and interactive, speech normal      Labs & Studies: I personally reviewed the following studies:  ROUTINE LABS (Last four results):  CMP  Recent Labs   Lab 05/10/21  0442 05/09/21  0556 05/08/21 0315 05/07/21  2134 05/07/21  0605    137 136  --  136   POTASSIUM 3.9 3.9 3.8 3.4 3.3*   CHLORIDE 108 108 107  --  106   CO2 23 23 23  --  24   ANIONGAP 6 6 6  --  6   GLC 83 74 91  --  98   BUN 5* 5* 4*  --  5*   CR 0.47* 0.43* 0.49*  --  0.42*   GFRESTIMATED >90 >90 >90  --  >90   GFRESTBLACK >90 >90 >90  --  >90   MICAH 8.9 9.0 8.4*  --  8.5   PROTTOTAL 7.7 7.5 7.3  --  7.0   ALBUMIN 2.6* 2.6* 2.6*  --  2.6*   BILITOTAL 0.9 1.1 1.1  --  1.4*   ALKPHOS 86 87 88  --  87   AST 75* 70* 41  --  36   ALT 43 38 28  --  28     CBC  Recent Labs   Lab 05/10/21  0442 05/09/21  0556 05/08/21  0315 05/07/21  0605   WBC 7.6 9.2 10.0 13.9*   RBC 2.90* 2.91* 2.69* 2.58*   HGB 8.2* 8.5* 7.5* 7.3*   HCT 26.3* 26.8* 24.2* 22.9*   MCV 91 92 90 89   MCH 28.3 29.2 27.9 28.3   MCHC 31.2* 31.7 31.0* 31.9   RDW 19.5* 19.9* 20.2* 20.0*   * 601* 515* 395       Medications list for reference:  Current Facility-Administered Medications   Medication     acetaminophen (TYLENOL) tablet 650 mg     acetaminophen (TYLENOL) tablet 650 mg     albuterol (PROAIR HFA/PROVENTIL HFA/VENTOLIN  HFA) 108 (90 Base) MCG/ACT inhaler 2 puff     albuterol (PROVENTIL) neb solution 2.5 mg     ARIPiprazole (ABILIFY) tablet 2 mg     ceFEPIme (MAXIPIME) 2 g vial to attach to  ml bag for ADULTS or 50 ml bag for PEDS     celecoxib (celeBREX) capsule 100 mg     deferasirox (JADENU) tablet 1,440 mg     diclofenac (VOLTAREN) 1 % topical gel 4 g     diphenhydrAMINE (BENADRYL) capsule 25 mg     fluticasone-vilanterol (BREO ELLIPTA) 200-25 MCG/INH inhaler 1 puff     hydroxyurea (HYDREA) capsule 2,000 mg     Lidocaine (LIDOCARE) 4 % Patch 1 patch    And     lidocaine patch in PLACE     lidocaine (LMX4) cream     lidocaine 1 % 0.1-1 mL     melatonin tablet 1 mg     methocarbamol (ROBAXIN) tablet 500 mg     morphine  PCA 1 mg/mL OPIOID TOLERANT     ondansetron (ZOFRAN) injection 4-8 mg     oxyCODONE IR (ROXICODONE) tablet 10 mg     Patient is already receiving anticoagulation with heparin, enoxaparin (LOVENOX), warfarin (COUMADIN)  or other anticoagulant medication     polyethylene glycol (MIRALAX) Packet 17 g     rivaroxaban ANTICOAGULANT (XARELTO) tablet 15 mg     senna-docusate (SENOKOT-S/PERICOLACE) 8.6-50 MG per tablet 1 tablet    Or     senna-docusate (SENOKOT-S/PERICOLACE) 8.6-50 MG per tablet 2 tablet     sertraline (ZOLOFT) tablet 200 mg     sodium chloride (PF) 0.9% PF flush 3 mL     sodium chloride (PF) 0.9% PF flush 3 mL     sodium chloride 0.9% infusion

## 2021-05-10 NOTE — PROGRESS NOTES
"CLINICAL NUTRITION SERVICES - ASSESSMENT NOTE     Nutrition Prescription    RECOMMENDATIONS FOR MDs/PROVIDERS TO ORDER:  Encourage oral intake    Malnutrition Status:    Non-severe malnutrition in the context of acute on chronic illness    Recommendations already ordered by Registered Dietitian (RD):  - Continue Regular diet  - Updated pt flavor preference of Ensure Max    Future/Additional Recommendations:  Monitor adequacy of PO intake. If documentation indicates that pt is consuming <50% nutritionally adequate meals TID or the equivalent with snacks/supplements, recommend:    Provide additional nutrition education on strategies to increase PO intake    Adjust supplement and/or scheduled snacks per pt preference    Calorie Counts to assess PO intake adequacy    Consider multivitamin with minerals if suspect PO intake inadequate.      REASON FOR ASSESSMENT  Jennifer Cervantes is a/an 21 year old female assessed by the dietitian for [facility]    NUTRITION HISTORY  - Information obtained from pt.  - PTA patient followed a regular diet of 3 meals per day  - Pt notes eating less while inpatient because she doesn't like the food. Pt stated, \"this is what always happens during hospital stays, and then I regain the weight when I get home\".  - Pt denies N/V/D or C  - Pt would prefer Vanilla Ensure for supplements     CURRENT NUTRITION ORDERS  Diet: Regular + Ensure Max Protein with meals and btw meals  Intake/Tolerance: Per flowsheet and HT, pt typically orders one meal per day     LABS  BUN 5 (L)  Cr 0.47 (L)  AST 75 (H)  CRP 76 (H) - trending down    MEDICATIONS  Medications reviewed    ANTHROPOMETRICS  Height: 162.6 cm (5' 4\")  Most Recent Weight: 72.6 kg (160 lb)    IBW: 55 kg  BMI: Overweight BMI 25-29.9  Weight History: 7# (4%) wt loss noted since admit (2 weeks)  Wt Readings from Last 10 Encounters:   05/09/21 72.6 kg (160 lb)   04/26/21 75.8 kg (167 lb 1.6 oz)   04/25/21 77.6 kg (171 lb)   04/24/21 77.9 kg (171 lb 11.2 oz) "   04/22/21 76.2 kg (168 lb)   04/21/21 76.3 kg (168 lb 3.4 oz)   04/16/21 76.3 kg (168 lb 3.4 oz)   04/16/21 76.3 kg (168 lb 3.2 oz)   04/12/21 75.2 kg (165 lb 11.2 oz)   04/07/21 73.9 kg (163 lb)     Dosing Weight: 59 kg (based on lowest ABW since admit (72.6 kg on 5/9) and IBW 55 kg    ASSESSED NUTRITION NEEDS  Estimated Energy Needs: 4145-8190 kcals/day (25 - 30 kcals/kg)  Justification: Maintenance  Estimated Protein Needs: 60-72 grams protein/day (1 - 1.2 grams of pro/kg)  Justification: Maintenance  Estimated Fluid Needs: (1 mL/kcal)   Justification: Maintenance    PHYSICAL FINDINGS  See malnutrition section below.    MALNUTRITION  % Intake: </= 50% for >/= 5 days (severe)  % Weight Loss: Up to 1-2% in 1 week (non-severe)  Subcutaneous Fat Loss: Unable to assess - pt laying in bed covered in blankets with lights off during encounter  Muscle Loss: Unable to assess - pt laying in bed covered in blankets with lights off during encounter  Fluid Accumulation/Edema: None noted  Malnutrition Diagnosis: Non-severe malnutrition in the context of acute on chronic illness    NUTRITION DIAGNOSIS  Unintended weight loss related to decreased appetite and menu fatigue as evidenced by pt report and 7# (4%) wt loss noted since admit (2 weeks).       INTERVENTIONS  Implementation  Nutrition Education: Provided education of role of RD in nutrition POC, importance of maintaining adequate po (encourage 3 meals per day), especially protein intakes to support strength and prevent loss of lean body mass.      Medical food supplement therapy: Provide education on role of ONS to help optimize intakes. Will update pt preferences.     Goals  Patient to consume % of nutritionally adequate meal trays TID, or the equivalent with supplements/snacks.     Monitoring/Evaluation  Progress toward goals will be monitored and evaluated per protocol.    Farhana Smith RD, LDN  5A/7B Pg [phone]

## 2021-05-10 NOTE — PLAN OF CARE
Cares [id number]. Alert, oriented. VSS on RA. Up SBA to commode, voids spontaneously. No BM this shift. Voids spontaneously in bedside commode. R PIV x2 infusing TKO/NS for morphine, port not accessed. PCA morphine decreased this shift, pt tolerating well. PRN oxy also available. Tolerating reg diet. No acute events. Continue to monitor.

## 2021-05-10 NOTE — PLAN OF CARE
Physical Therapy Discharge Summary    Reason for therapy discharge:    All goals and outcomes met, no further needs identified.    Progress towards therapy goal(s). [facility] for goal details.  Goals met    Therapy recommendation(s):    No further therapy is recommended. Patient is independent with mobility and feels she is back to her baseline.

## 2021-05-11 ENCOUNTER — PATIENT OUTREACH (OUTPATIENT)
Dept: CARE COORDINATION | Facility: CLINIC | Age: 21
End: 2021-05-11

## 2021-05-11 VITALS
BODY MASS INDEX: 27.32 KG/M2 | DIASTOLIC BLOOD PRESSURE: 83 MMHG | TEMPERATURE: 98.8 F | SYSTOLIC BLOOD PRESSURE: 137 MMHG | HEART RATE: 75 BPM | WEIGHT: 159.17 LBS | OXYGEN SATURATION: 99 % | RESPIRATION RATE: 16 BRPM

## 2021-05-11 DIAGNOSIS — D57.00 SICKLE CELL PAIN CRISIS (H): ICD-10-CM

## 2021-05-11 LAB
LAB SCANNED RESULT: NORMAL
RIVAROXABAN PPP CHRO-MCNC: 213 NG/ML

## 2021-05-11 PROCEDURE — 99207 PR APP CREDIT; MD BILLING SHARED VISIT: CPT | Performed by: NURSE PRACTITIONER

## 2021-05-11 PROCEDURE — 250N000013 HC RX MED GY IP 250 OP 250 PS 637: Performed by: INTERNAL MEDICINE

## 2021-05-11 PROCEDURE — 250N000013 HC RX MED GY IP 250 OP 250 PS 637: Performed by: PHYSICIAN ASSISTANT

## 2021-05-11 PROCEDURE — 250N000013 HC RX MED GY IP 250 OP 250 PS 637: Performed by: PEDIATRICS

## 2021-05-11 PROCEDURE — 99231 SBSQ HOSP IP/OBS SF/LOW 25: CPT | Performed by: PEDIATRICS

## 2021-05-11 PROCEDURE — 99239 HOSP IP/OBS DSCHRG MGMT >30: CPT | Performed by: STUDENT IN AN ORGANIZED HEALTH CARE EDUCATION/TRAINING PROGRAM

## 2021-05-11 RX ORDER — OXYCODONE HYDROCHLORIDE 15 MG/1
15 TABLET ORAL EVERY 6 HOURS PRN
Qty: 60 TABLET | Refills: 0 | Status: SHIPPED | OUTPATIENT
Start: 2021-05-11 | End: 2021-05-19

## 2021-05-11 RX ORDER — OXYCODONE HCL 10 MG/1
10 TABLET, FILM COATED, EXTENDED RELEASE ORAL EVERY 12 HOURS
Qty: 60 TABLET | Refills: 0 | Status: SHIPPED | OUTPATIENT
Start: 2021-05-11 | End: 2021-05-19

## 2021-05-11 RX ORDER — METHOCARBAMOL 500 MG/1
500 TABLET, FILM COATED ORAL 4 TIMES DAILY
Qty: 120 TABLET | Refills: 2 | Status: SHIPPED | OUTPATIENT
Start: 2021-05-11 | End: 2021-05-19

## 2021-05-11 RX ORDER — BUDESONIDE AND FORMOTEROL FUMARATE DIHYDRATE 160; 4.5 UG/1; UG/1
1 AEROSOL RESPIRATORY (INHALATION) EVERY EVENING
Qty: 10.2 G | Refills: 3 | COMMUNITY
Start: 2021-05-11 | End: 2021-09-20

## 2021-05-11 RX ADMIN — HYDROXYUREA 2000 MG: 500 CAPSULE ORAL at 09:09

## 2021-05-11 RX ADMIN — SERTRALINE HYDROCHLORIDE 200 MG: 100 TABLET ORAL at 09:09

## 2021-05-11 RX ADMIN — ACETAMINOPHEN 650 MG: 325 TABLET, FILM COATED ORAL at 09:09

## 2021-05-11 RX ADMIN — ARIPIPRAZOLE 2 MG: 2 TABLET ORAL at 09:09

## 2021-05-11 RX ADMIN — METHOCARBAMOL 500 MG: 500 TABLET, FILM COATED ORAL at 09:09

## 2021-05-11 RX ADMIN — RIVAROXABAN 15 MG: 15 TABLET, FILM COATED ORAL at 09:09

## 2021-05-11 ASSESSMENT — ACTIVITIES OF DAILY LIVING (ADL)
ADLS_ACUITY_SCORE: 14

## 2021-05-11 NOTE — DISCHARGE SUMMARY
[facility]  Hospitalist Discharge Summary      Date of Admission:  4/26/2021  Date of Discharge:  5/11/2021  3:37 PM  Discharging Provider: EITAN Lisa CNP, Dangelo Davis MD   Discharge Team: Hospitalist Service, Gold 4    Discharge Diagnoses   # Sepsis 2/2 acute chest syndrome   # Acute hypoxic respiratory failure   # Sickle cell pain crisis   # Worsening PE   # Seroma  # Thrombocytosis   # Hx iron overload   # Asthma   # Anxiety, depression     Follow-ups Needed After Discharge   Follow-up Appointments     Adult [facility] Follow-up and recommended labs and tests      Follow up with primary care provider, Suraj Case, within 7 days   for hospital follow- up.  Hemoglobin check recommended.     Appointments on [place] and/or [facility] (with [facility] or [facility]   provider or service). Call [phone] if you haven't heard regarding   these appointments within 7 days of discharge.             Unresulted Labs Ordered in the Past 30 Days of this Admission     Date and Time Order Name Status Description    5/7/2021 0809 Blood culture Preliminary     5/7/2021 0809 Blood culture Preliminary     5/4/2021 0807 Transfusion reaction evaluation In process     5/3/2021 1720 Coccidioides Agn Quant EIA Blood In process     5/3/2021 1720 Blastomyces Agn Quant EIA Blood In process           Discharge Disposition   Discharged to home  Condition at discharge: Stable    Hospital Course    Jennifer Cervantes is a 22 yo F with PMHx of sickle cell anemia c/b CVA with residual RUE hemiparesis, and PE, iron overload 2/2 frequent transfusions, asthma, depression, and anxiety who presented with worsening pain, admitted on 4/26/2021 for acute sickle cell pain crisis, complicated by acute hypoxic respiratory failure due to acute chest syndrome.     # Sepsis 2/2 acute chest syndrome   # Acute hypoxic respiratory failure   # Sickle cell pain crisis   Hospital course complicated by hypoxia  with increased O2 requirement, from 2-3L initially up to HFNC, and fevers, CXR with RLL concerning for pneumonia, with leukocytosis meeting sepsis criteria. Started on Ceftriaxone/Azithromycin. Lactic flat. COVID negative x2. TTE normal. Concern for acute chest and thus underwent exchange transfusion on 4/30 with minimal improvement in oxygen requirements, but did improve fever curve. Given persistent hypoxia, concern about another acute inflammatory or infectious process. Procalcitonin rising to 0.44. CT chest w/o contrast with diffuse GGO and consolidated opacities concerning for infection.  ID and pulmonary consulted with extensive infectious work up  Including RVP, strep pneumo and legionella urinary antigen, histo serum and urinary antigen, blasto urinary antigen, cryptococcus serum, Beta D glucan, galactomannan, CMV, VZV and HSV 1&2, all of which were negative.  Remaining studies still pending below.  Vanco was stopped due to negative MRSA. Hematology recommended repeat transfusion exchange, which was completed on 5/4, and respiratory status significantly improved.  No supplemental oxygen requirements.  Completed treatment with Cefepime on 5/10.  Able to be successfully weaned from PCA and discharged on home regimen.  Scheduled for follow up with Hematology on 5/12.     # Worsening PE - Admitted on 2/1-2/3/2021 with a new PE and started on DOAC (initally on rivaroxaban and later switched to Eliquis after finding RUE DVT for possible rivaroxaban failure). Patient reports compliance with anticoagulation. On this admission, pt found to have bilateral subsegmental perfusion defects on VQ scan, worse compared to prior VQ scan on 2/1.  Changed from Eliquis to Xarelto.  Continued Xarelto 15 mg BID x 21 days (through 5/28) followed by 20 mg daily with food (starting 5/29).     # Seroma - CT chest from 5/3 also showed howed walled off fluid collection in the superior left chest wall port, this was more suggestive of  seroma (per CT report). No signs of inflammation around the port and blood cultures are all negative so far. US chest wall on 5/10 with small collection 2.1 x 0.8 x 2.4cm superior to port-a-cath.      # Thrombocytosis - Likely reactive from recent infection and acute chest syndrome.  Repeat labs outpatient with Hematology.     # Hx iron overload - Continue PTA Deferasirox 1440 mg daily      # Asthma - Continue PTA Symbicort and PRN Albuterol.     # Anxiety, depression - Continue PTA Sertraline 200 mg daily and Abilify 2 mg daily       Consultations This Hospital Stay   HEMATOLOGY ADULT IP CONSULT  CARE MANAGEMENT / SOCIAL WORK IP CONSULT  HEMATOLOGY ADULT IP CONSULT  VASCULAR ACCESS CARE ADULT IP CONSULT  VASCULAR ACCESS CARE ADULT IP CONSULT  VASCULAR ACCESS CARE ADULT IP CONSULT  VASCULAR ACCESS CARE ADULT IP CONSULT  APHERESIS TRANSFUSION ADULT/PEDS IP CONSULT  INTERVENTIONAL RADIOLOGY ADULT/PEDS IP CONSULT  RESPIRATORY CARE IP CONSULT  VASCULAR ACCESS CARE ADULT IP CONSULT  PULMONARY GENERAL ADULT IP CONSULT  SOCIAL WORK IP CONSULT  INFECTIOUS DISEASE GENERAL ADULT IP CONSULT  PHARMACY TO DOSE VANCO  INTERVENTIONAL RADIOLOGY ADULT/PEDS IP CONSULT  INTERVENTIONAL RADIOLOGY ADULT/PEDS IP CONSULT  PHYSICAL THERAPY ADULT IP CONSULT  INFECTIOUS DISEASE GENERAL ADULT IP CONSULT  INFECTIOUS DISEASE GENERAL ADULT IP CONSULT    Code Status   Full Code    Time Spent on this Encounter   I, EITAN Lisa CNP, personally saw the patient today and spent greater than 30 minutes discharging this patient.       EITAN Lisa CNP  [facility] [address] [place] [id number]  Phone: [phone]  ______________________________________________________________________    Physical Exam   Vital Signs: Temp: 98  F (36.7  C) Temp src: Oral BP: 119/74 Pulse: 85   Resp: 16 SpO2: 98 % O2 Device: None (Room air)    Weight: 159 lbs 2.75 oz    GENERAL: Alert and oriented x 3. Well nourished, well  developed.  No acute distress.    HEENT: Normocephalic, atraumatic. Anicteric sclera. Mucous membranes moist.   CV: RRR. S1, S2. No murmurs appreciated.   RESPIRATORY: Effort normal on room air. Lungs CTAB with no wheezing, rales, or rhonchi.   GI: Abdomen soft and non distended, bowel sounds present x all 4 quadrants. No tenderness, rebound, or guarding.   NEUROLOGICAL: No new focal deficits. Follows commands.  R sided hemiparesis is baseline.   MUSCULOSKELETAL: No joint swelling or tenderness. Moves all extremities.   EXTREMITIES: No gross deformities. No peripheral edema.   SKIN: Grossly warm, dry, and intact. No jaundice. No rashes.          Primary Care Physician   Suraj Case    Discharge Orders      Reason for your hospital stay    Dear Jennifer,     You were admitted to [facility] from 4/26 to 5/11 with sickle cell pain crisis complicated by acute chest syndrome.  You were treated with IV antibiotics and exchange transfusion with resolution of symptoms.  Additionally, it appears that the blood clots in your lungs may be slightly increased, so we changed your previous anticoagulation pill (Eliquis) to Xarelto.      We are suggesting the following medication changes:  - Stop taking Eliquis  - Take Xarelto (rivaroxaban) 15mg two times daily through evening 5/28/21 then stop.  Then on 5/29, start taking Xarelto 20mg one time daily (be sure to take with a large meal, preferably supper time) until further direction by your Hematology providers.        Please set up an appointment with your PCP in 7-10 days for routine post-hospital follow up.  Recommend to check Hgb to ensure stability.       Please follow up with Hematology as scheduled on 5/12/21.      It was a pleasure to care for you during your hospital stay.  Please let us know if there is anything else we can do for you so that we can be sure you are leaving completely satisfied with your care experience.    If you have any questions, please call the  hospital at [phone] and ask to talk to a nurse on unit 5B.    Take care,    Zuri Arrieta CNP  [facility] - Internal Medicine   Hospitalist Service     Adult [facility] Follow-up and recommended labs and tests    Follow up with primary care provider, Suraj Case, within 7 days for hospital follow- up.  Hemoglobin check recommended.     Appointments on [place] and/or [facility] (with [facility] or [facility] provider or service). Call [phone] if you haven't heard regarding these appointments within 7 days of discharge.     Activity    Your activity upon discharge: activity as tolerated     When to contact your care team    Call your PCP or return to ED for temperatures > 100.7 degrees, worsening or changing pain, uncontrolled vomiting or inability to tolerate oral intake, new or worsening diarrhea, new or worsening shortness of breath, decreased urine output, yellowing of the eyes or skin, confusion, weakness, blood in urine or stools.     Diet    Follow this diet upon discharge: Regular Diet Adult       Significant Results and Procedures   Most Recent 3 CBC's:  Recent Labs   Lab Test 05/10/21  0442 05/09/21  0556 05/08/21  0315   WBC 7.6 9.2 10.0   HGB 8.2* 8.5* 7.5*   MCV 91 92 90   * 601* 515*     Most Recent 3 BMP's:  Recent Labs   Lab Test 05/10/21  0442 05/09/21  0556 05/08/21  0315    137 136   POTASSIUM 3.9 3.9 3.8   CHLORIDE 108 108 107   CO2 23 23 23   BUN 5* 5* 4*   CR 0.47* 0.43* 0.49*   ANIONGAP 6 6 6   MICAH 8.9 9.0 8.4*   GLC 83 74 91     Most Recent 2 LFT's:  Recent Labs   Lab Test 05/10/21  0442 05/09/21  0556   AST 75* 70*   ALT 43 38   ALKPHOS 86 87   BILITOTAL 0.9 1.1     Most Recent 3 INR's:  Recent Labs   Lab Test 04/03/21  1910 03/28/21  1300 03/20/21  1245   INR 1.28* 1.26* 1.34*   ,   Results for orders placed or performed during the hospital encounter of 04/26/21   NM Lung Scan Perfusion Particulate     Value    Radiologist flags Worsening pulmonary  emboli (Urgent)    Narrative    Examination:  NM LUNG SCAN PERFUSION PARTICULATE       Date:  4/27/2021 1:51 PM     Indication:    Chest pain, nonspecific; PE suspected, high prob     Previous Study: Lung perfusion scan 2/1/2021 Chest x-ray 4/25/2021    Additional Information: none    Technique:    The patient received 6.2 mCi of Tc-99m labeled MAA intravenously.  Anterior and posterior quantitative views were obtained of the lungs  using a dual headed camera camera. A standard eight view lung  perfusion scan was also obtained. Calculations were performed using  the geometric mean technique. SPECT images of the lungs were obtained.  CT images were obtained for the purposes of attenuation correction and  anatomic localization only. Fused SPECT-CT processing was performed in  the [facility] workstation, archived in PACS, and reviewed by the  radiologist.    Findings:    Planar images and fused SPECT-CT images demonstrate reduced  radiotracer uptake within the posterior basal segment of the left  lower lobe and posterior segment of the left upper lobe. Additional  mildly reduced radiotracer tracer uptake within the medial posterior  segment of the right upper lobe. Overall findings have worsened  compared to the prior perfusion scan on 2/1/2021.      Impression    Impression: Bilateral subsegmental perfusion defects, left greater  than right, consistent with pulmonary emboli. Overall findings have  worsened since the comparison perfusion scan on 2/1/2021, particularly  within the left lung.    [Urgent Result: Worsening pulmonary emboli]    RONALDO Baca was contacted by Dr. Trinh at 4/27/2021 4:40  PM and verbalized understanding of the urgent finding.         I have personally reviewed the examination and initial interpretation  and I agree with the findings.    RAJ VIDES MD   XR Chest Port 1 View    Narrative    Exam: XR CHEST PORT 1 VIEW, 4/28/2021 6:32 PM    Indication: shortness of  breath    Comparison: Chest x-ray 4/22/2021, VQ scan 4/27/2021    Findings:   Supine AP view of the chest. The patient is rotated to the right. Left  chest wall Port-A-Cath terminates overlying the superior cavoatrial  junction. There is a new right lower lung confluent wedge-shaped  opacity. No pleural effusion or pneumothorax. Normal heart size. No  acute osseous abnormality.      Impression    Impression:   Increased right lower lung opacity suggesting infection, atelectasis,  or infarction.     I have personally reviewed the examination and initial interpretation  and I agree with the findings.    KALI ROSARIO MD   XR Chest Port 1 View    Narrative    XR CHEST PORT 1 VIEW  4/29/2021 3:29 AM      HISTORY: Sickle, PE, worsening hypoxia.    COMPARISON: 4/28/2021, 4/25/2021    FINDINGS: AP view of the chest. Left chest port tip projects at the  cavoatrial junction. Cardiac silhouette is stable. Stable right lower  lobe opacities. No significant pleural effusion or pneumothorax.      Impression    IMPRESSION: Stable right lower lobe opacities.    I have personally reviewed the examination and initial interpretation  and I agree with the findings.    MANSI GAFFNEY MD   XR Chest Port 1 View    Narrative    XR CHEST PORT 1 VIEW  4/30/2021 5:55 AM      HISTORY: H/o PE, sickle cell, now with acute chest vs pneumonia last  1-2 days and progressively worsening breathing    COMPARISON: 4/29/2021, 4/28/2021, 4/25/2021    FINDINGS: AP view of the chest. Left chest port tip is at the low SVC.  Cardiac silhouette is stable. Increased hazy opacities bilaterally,  right greater than left. Likely component of layering pleural  effusions. No pneumothorax.      Impression    IMPRESSION: Ultrasound could help delineate how much of these  opacities are related to the effusions. Consolidation from pneumonia  in the lower lobes should be the prime consideration. Edema felt less  likely.    I have personally reviewed the  examination and initial interpretation  and I agree with the findings.    BRENDA CURTIS MD   XR Chest Port 1 View    Narrative    EXAM: XR CHEST PORT 1 VIEW  5/2/2021 12:23 PM     HISTORY:  Patient with sickle cell disease, acute chest syndrome and  PE, Hypoxia, shortness of breath.       COMPARISON:  Chest x-ray 4/30/2021    FINDINGS:   Portable semiupright AP view of the chest. Left chest wall Port-A-Cath  with tip projecting in the low SVC.    Trachea is midline. Cardiomediastinal silhouette is unchanged. Stable  to slightly increased diffuse bilateral hazy/patchy pulmonary  opacities. Layering bilateral pleural effusions. No pneumothorax is  appreciated.      Impression    IMPRESSION:   Increased diffuse bilateral pulmonary opacities with stable bilateral  pleural effusions, favor worsening pulmonary edema.    I have personally reviewed the examination and initial interpretation  and I agree with the findings.    FREDY DACOSTA MD   CT Chest w/o Contrast    Narrative    EXAMINATION: Chest CT  5/3/2021 11:14 AM    CLINICAL HISTORY: Respiratory illness, nondiagnostic xray    COMPARISON: Chest x-ray 5/2/2021.    TECHNIQUE: CT imaging obtained through the chest without contrast.  Axial, coronal, and sagittal reconstructions and axial MIP reformatted  images are reviewed.     CONTRAST: None    FINDINGS:  Left chest wall port catheter tip terminates at the superior  cavoatrial junction.    Lungs: The central tracheobronchial tree is patent. Diffuse mixed  groundglass and consolidative opacities throughout the lungs, most  pronounced in the dependent portion of the lung fields. No pleural  effusion or pneumothorax.    Mediastinum: The thyroid is unremarkable. Cardiac size is enlarged.  Left ventricular dilation. Small pericardial effusion. Normal caliber  of the aorta and main pulmonary artery. No significant coronary artery  calcium. Numerous enlarged mediastinal and hilar lymph nodes for  example 9 mm right  paratracheal node (series 2, image 14), and a 10 mm  left hilar node (series 2, image 22).     Bones and soft tissues: No suspicious bone findings. Peripherally  walled off fluid collection in the left chest wall, located superior  to the chest wall port, measuring 2.5 x 1.5 cm.    Upper Abdomen: Limited evaluation of the upper abdomen. Several  enlarged gastrohepatic nodes, example an 8 mm short axis node (series  2, image 51).      Impression    IMPRESSION:   1. Diffuse groundglass and consolidative opacities throughout the  lungs, most pronounced in the dependent aspects of the lung fields.  Findings are suspicious for infection, including atypical pneumonia.  Less likely to represent cardiogenic pulmonary edema given the lack of  pleural effusion. Also consider acute chest syndrome in a patient with  sickle cell.  2. Walled off fluid collection in the superior left chest wall, in the  region of the chest wall port. Findings may represent a postprocedural  seroma, less likely an abscess.  3. Enlarged thoracic and gastrohepatic lymph nodes. Findings may be  reactive in the setting of infection, or related to patient's  underlying sickle cell anemia.      I have personally reviewed the examination and initial interpretation  and I agree with the findings.    BRENDA CURTIS MD   XR Thoracic Spine 2 Views    Narrative    Exam: XR THORACIC SPINE 2 VW, 5/5/2021 1:34 PM    Indication: Thoracic back pain    Comparison: CT chest 5/3/2021.    Findings:   AP, lateral, and swimmer's view of the thoracic spine.    Left IJ Port-A-Cath tip projects over the right atrium.    No fracture or subluxation of the thoracic spine. No aggressive  osseous lesion.    Postsurgical changes of cholecystectomy. Patchy hazy opacities  throughout the lungs and a retrocardiac opacity with air bronchograms  in the lower lobes      Impression    Impression:   1. No acute osseous abnormality.  2. Multifocal opacities in the lungs, in particular  retrocardiac,  which are suspicious for infection.    FREDY DACOSTA MD   IR CVC Non Tunnel Line Removal    Narrative    DIAGNOSIS: Sickle cell pain crisis    PROCEDURE: IR non tunneled central venous catheter removal       Impression    IMPRESSION: Completed removal of LEFT femoral NON-tunneled central  venous catheter in its entirety. There were no complications.    ----------    CLINICAL HISTORY: The patient has a NON tunneled left femoral central  venous catheter placed left April 30, 2021 for acute sickle cell pain  crisis. Patient presents for removal as it is no longer needed.    Patient is on anoxic parents and exit and. Platelets are 369 and INR  1.28 today.    PERFORMED BY: Valeri Best PA-C    MEDICATIONS: None     DESCRIPTION: Physical examination demonstrated no erythema,  tenderness, fluctuance, or discharge at the catheter exit site or  along the tract. The catheter and its entry site into the skin was  prepped and draped in usual sterile fashion.     Using steady gentle traction, the catheter was freed from the  subcutaneous tissue, and the entire catheter was removed without  difficulty. Compression was applied over the venipuncture site, as  well as along the tract, until good hemostasis was achieved for 10  minutes.  However patient rolled to try to use restroom and there was  bleeding again with manual compression applied over site for 10  minutes while patient utilized the bed pan with nursing assistance.    A sterile dressing was applied to the skin at the exit site.  Bed rest  for 30 minutes.     COMPLICATIONS:  No immediate concerns; the patient remained stable  throughout the procedure and tolerated it well.    ESTIMATED BLOOD LOSS:  5 mL    SPECIMENS: None    TIME:  A total of 20 minutes was spent with the patient. Greater than  50% of the time was spent in counseling, education, and coordination  of care.    VALERI BEST PA-C   XR Chest Port 1 View    Narrative    Portable  chest    INDICATION: Fever and hypoxia    COMPARISON: Chest CT 5/3/2021    FINDINGS: Heart is upper normal in size. Left IJ port catheter tip is  in the right atrium. No pneumothorax. Bibasilar pulmonary opacities  are present which may indicate atelectasis or edema comment recommend  follow-up to clearing to exclude infection.      Impression    IMPRESSION: Upper normal heart size for portable technique. Scattered  areas of possible bibasilar atelectasis and/or edema.    MARY JANE JUÁREZ MD   US Extremity Non Vascular Left    Narrative    Exam: US EXTREMITY NON VASCULAR LEFT, 5/10/2021 3:39 PM    Indication: To assess fluid collection just superior to port on L  anterior chest    Comparison: None    Findings:   Ultrasound evaluation of the left upper extremity fluid collection.  Small left chest wall fluid collection superior to patient's  Port-A-Cath. Fluid collection measures 2.1 x 0.8 x 2.4 cm.      Impression    Impression: Small collection of simple appearing fluid superior to  patient's Port-A-Cath in the left chest wall.    I have personally reviewed the examination and initial interpretation  and I agree with the findings.    MANSI GAFFNEY MD   Echo Complete    Narrative    [id number]  [id number]  [id number]  [id number]^NICHO^S     [facility]  Echocardiography Laboratory  [address] [place], [place] [id number]     Name: HIMANSHU AL  MRN: [id number]  : 2000  Study Date: 2021 12:37 PM  Age: 21 yrs  Gender: Female  Patient Location: [facility]  Reason For Study: Pulmonary Embolism  Ordering Physician: NICHO CESRA  Referring Physician: LESA THOMPSON  Performed By: Artie Machado     BSA: 1.8 m2  Height: 64 in  Weight: 169 lb  HR: 94  BP: 119/72 mmHg  ______________________________________________________________________________  Procedure  Echocardiogram with two-dimensional, color and spectral Doppler  performed.  ______________________________________________________________________________  Interpretation Summary  Borderline (EF 50-55%) reduced left ventricular function is present. LVEF 53%  based on biplane 2D tracing.  Right ventricular function, chamber size, wall motion, and thickness are  normal.  Pulmonary artery systolic pressure is 45 mmHg (42 mmHg+RA pressure).  Previous study not available for comparison.  ______________________________________________________________________________  Left Ventricle  Left ventricular size is normal. Left ventricular wall thickness is normal.  LVEF 53% based on biplane 2D tracing. Borderline (EF 50-55%) reduced left  ventricular function is present. Left ventricular diastolic function is  indeterminate.     Right Ventricle  Right ventricular function, chamber size, wall motion, and thickness are  normal.     Atria  Both atria appear normal.     Mitral Valve  The mitral valve is normal.     Aortic Valve  Aortic valve is normal in structure and function. The aortic valve is  tricuspid.     Tricuspid Valve  The tricuspid valve is normal. Trace tricuspid insufficiency is present. The  right ventricular systolic pressure is approximated at 42.4 mmHg plus the  right atrial pressure. Pulmonary artery systolic pressure is 45 mmHg (42  mmHg+RA pressure).     Pulmonic Valve  The pulmonic valve is normal.     Vessels  The aorta root is normal. The thoracic aorta is normal. The pulmonary artery  cannot be assessed. The inferior vena cava was normal in size with preserved  respiratory variability. IVC diameter <2.1 cm collapsing >50% with sniff  suggests a normal RA pressure of 3 mmHg.     Pericardium  No pericardial effusion is present.     Compared to Previous Study  Previous study not available for comparison.  ______________________________________________________________________________  MMode/2D Measurements & Calculations  IVSd: 0.79 cm     LVIDd: 5.0 cm  LVIDs: 3.5  cm  LVPWd: 0.90 cm  FS: 31.0 %  LV mass(C)d: 145.9 grams  LV mass(C)dI: 80.1 grams/m2  asc Aorta Diam: 2.4 cm  LVOT diam: 1.9 cm  LVOT area: 2.8 cm2  RWT: 0.36     Doppler Measurements & Calculations  MV E max ashkan: 132.0 cm/sec  MV A max ashkan: 81.5 cm/sec  MV E/A: 1.6  MV dec slope: 839.0 cm/sec2  Ao V2 max: 171.0 cm/sec  Ao max P.0 mmHg  WHITNEY(V,D): 1.7 cm2  LV V1 max P.3 mmHg  LV V1 max: 104.0 cm/sec  LV V1 VTI: 20.9 cm  SV(LVOT): 59.3 ml  SI(LVOT): 32.5 ml/m2  PA V2 max: 125.0 cm/sec  PA max P.3 mmHg  PA acc time: 0.07 sec  TR max ashkan: 325.5 cm/sec  TR max P.4 mmHg  AV Ashkan Ratio (DI): 0.61     Lateral E/e': 6.4     ______________________________________________________________________________  Report approved by: Chava Wagner 2021 01:36 PM               Discharge Medications   Current Discharge Medication List      START taking these medications    Details   methocarbamol (ROBAXIN) 500 MG tablet Take 1 tablet (500 mg) by mouth 4 times daily  Qty: 120 tablet, Refills: 2    Associated Diagnoses: Hb-SS disease with crisis (H)      !! rivaroxaban ANTICOAGULANT (XARELTO ANTICOAGULANT) 20 MG TABS tablet Take 1 tablet (20 mg) by mouth daily (with dinner) Starting on  after completing your 15mg twice daily dosing on   Qty: 30 tablet, Refills: 2    Associated Diagnoses: Pulmonary embolism, other, unspecified chronicity, unspecified whether acute cor pulmonale present (H)      !! rivaroxaban ANTICOAGULANT (XARELTO) 15 MG TABS tablet Take 1 tablet (15 mg) by mouth 2 times daily (with meals) for 17 days  Qty: 34 tablet, Refills: 0    Associated Diagnoses: Pulmonary embolism, other, unspecified chronicity, unspecified whether acute cor pulmonale present (H)       !! - Potential duplicate medications found. Please discuss with provider.      CONTINUE these medications which have CHANGED    Details   budesonide-formoterol (SYMBICORT) 160-4.5 MCG/ACT Inhaler Inhale 1 puff into the lungs  every evening  Qty: 10.2 g, Refills: 3    Associated Diagnoses: Asthmatic bronchitis without complication, unspecified asthma severity, unspecified whether persistent         CONTINUE these medications which have NOT CHANGED    Details   acetaminophen (TYLENOL) 325 MG tablet Take 2 tablets (650 mg) by mouth every 6 hours as needed for mild pain  Qty: 120 tablet, Refills: 3    Associated Diagnoses: Sickle cell crisis (H)      albuterol (PROAIR HFA/PROVENTIL HFA/VENTOLIN HFA) 108 (90 Base) MCG/ACT inhaler Inhale 2 puffs into the lungs every 6 hours as needed for shortness of breath / dyspnea or wheezing  Qty: 8.5 g, Refills: 3    Comments: Pharmacy may dispense brand covered by insurance (Proair, or proventil or ventolin or generic albuterol inhaler)  Associated Diagnoses: Asthmatic bronchitis without complication, unspecified asthma severity, unspecified whether persistent      albuterol (PROVENTIL) (2.5 MG/3ML) 0.083% neb solution Take 1 vial (2.5 mg) by nebulization every 6 hours as needed for shortness of breath / dyspnea or wheezing  Qty: 12 mL, Refills: 4    Associated Diagnoses: 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); Asthmatic bronchitis without complication, unspecified asthma severity, unspecified whether persistent      ARIPiprazole (ABILIFY) 2 MG tablet Take 1 tablet (2 mg) by mouth daily  Qty: 30 tablet, Refills: 3    Associated Diagnoses: Depression, unspecified depression type      celecoxib (CELEBREX) 100 MG capsule Take 1 capsule (100 mg) by mouth 2 times daily  Qty: 60 capsule, Refills: 3    Associated Diagnoses: 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)      diclofenac (VOLTAREN) 1 % topical gel Apply 4 g topically 4 times daily as needed for moderate pain Apply to back, legs, and arms for pain  Qty: 150 g, Refills: 3    Associated Diagnoses: Hb-SS disease with crisis (H); Chronic pain syndrome      diphenhydrAMINE (BENADRYL) 25 MG capsule Take 1-2 capsules (25-50 mg) by mouth nightly as needed for sleep  Qty: 60  capsule, Refills: 3    Associated Diagnoses: Insomnia, unspecified type      EPINEPHrine (ANY BX GENERIC EQUIV) 0.3 MG/0.3ML injection 2-pack Inject 0.3 mLs (0.3 mg) into the muscle as needed for anaphylaxis  Qty: 1 each, Refills: 1    Associated Diagnoses: Anaphylaxis, sequela      Hydroxyurea 1000 MG TABS Take 2,000 mg by mouth daily  Qty: 60 tablet, Refills: 3    Associated Diagnoses: 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)      JADENU 360 MG tablet Take 4 tablets (1,440 mg) by mouth every evening  Qty: 30 tablet, Refills: 0    Associated Diagnoses: Iron overload due to repeated red blood cell transfusions      medroxyPROGESTERone (DEPO-PROVERA) 150 MG/ML IM injection Inject 150 mg into the muscle      naloxone (NARCAN) 4 MG/0.1ML nasal spray Spray 1 spray (4 mg) into one nostril alternating nostrils once as needed for opioid reversal every 2-3 minutes until assistance arrives  Qty: 0.2 mL, Refills: 1    Associated Diagnoses: Sickle cell crisis (H)      ondansetron (ZOFRAN) 8 MG tablet       oxyCODONE (OXYCONTIN) 10 MG 12 hr tablet Take 1 tablet (10 mg) by mouth every 12 hours  Qty: 60 tablet, Refills: 0    Associated Diagnoses: Sickle cell pain crisis (H)      oxyCODONE IR (ROXICODONE) 15 MG tablet Take 1 tablet (15 mg) by mouth every 6 hours as needed for severe pain  Qty: 60 tablet, Refills: 0    Associated Diagnoses: Sickle cell pain crisis (H)      sertraline (ZOLOFT) 100 MG tablet Take 2 tablets (200 mg) by mouth daily  Qty: 180 tablet, Refills: 3    Associated Diagnoses: 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); Anxiety         STOP taking these medications       apixaban ANTICOAGULANT (ELIQUIS) 5 MG tablet Comments:   Reason for Stopping:         aspirin (ASPIRIN) 81 MG EC tablet Comments:   Reason for Stopping:             Allergies   Allergies   Allergen Reactions     Contrast Dye      Hives and breathing issues     Fish-Derived Products Hives     Seafood Hives     Diagnostic X-Ray Materials      Gadolinium

## 2021-05-11 NOTE — PLAN OF CARE
Pt is AO*4, lethargic. Pt is on RA, satting well >97%. Denies nausea. Complains of generalized pain on PCA morphine. Pt is SBA. Pt voiding adequately. No BM overnight. Follow plan of care.    Isaura Lindsay RN on 5/11/2021 at 7:01 AM

## 2021-05-11 NOTE — PLAN OF CARE
No acute events this shift. Patient appeared calm, verbally responsive with no c/o pain or discomfort and no respiratory issues noted. With ongoing Morphine PCA, pain well managed with muscle relaxants. Fair appetite this shift. Resting. No other calls made.

## 2021-05-11 NOTE — PROGRESS NOTES
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      Hospital course was complicated by finding of worsening PE in setting of low Apixaban level, acute hypoxic respiratory failure, pneumonia, and acute chest syndrome s/p exchange transfusion on 4/30 and 5/4. After second exchange, her oxygen requirement dramatically improved from 20L to 1-2L on 5/5/21 and she was off oxygen as of 5/6.    # PE with worsening noted on 4/27 V/Q scan  She was admitted 2/1/21-2/3/21 with a new PE, started on Rivaroxaban, switched to Eliquis 3/25/21 with finding of RUE DVT (? rivaroxaban failure). She only held Eliquis for 1 day for Left Port Removal and Left Port Placement on 4/21. However,  4/27 V/Q scan showed bilateral subsegmental perfusion defects, left greater than right, consistent with pulmonary emboli. Overall findings had worsened since the comparison perfusion scan on 2/1/2021, particularly within the left lung. Pt reported having been compliant with Eliquis so it was continued at time of admission. Apixaban level was checked on 4/29 and returned at 29, much lower than expected, presumably due to poor absorption. Anticoagulation was switched to Lovenox 4/29 PM. Subsequently changed to rivaroxaban (x 5/7 PM). Encourage to take with meals.   - 5/10 rivoraxaban level within appropriate range. Pt aware of need for consistent timing of dosing outpatient.     # Acute hypoxic respiratory failure, multifactorial  (acute chest syndrome, CAP, fluid overload, anemia). Resolved.   - TTE on 5/2 is not suggesting pulmonary hypertension or CHF.   - s/p antibiotics per primary team    # Acute Chest Syndrome s/p exchange transfusion on 4/30 and 5/4. Resolved.   # Sickle cell pain crisis, improving.  # Iron overload  - s/p RBC exchange transfusion on 4/30 and 5/4. She remains off supplemental O2 at present.   - continue Hydrea  - continue deferasirox   - pain control per primary team, agree with taper off PCA back to home regimen. Dr. Duncan will send narcotic refills for pt.     Recommendations:   - agree with discharge   - Dr. Duncan will send narcotic refills for pt (oxycodone, oxycontin)   - please discharge with script for remainder of rivaroxaban 15mg BID x 21 days (from first dose given 5/7 PM), followed by 20mg daily with food    Follow-up:  - pt currently has follow-up in [facility] with ANDREAS on 5/12 (video visit @ 5:10 PM)      Patient was seen with and plan of care was discussed with attending physician, Dr. Duncan.    Thank you for the opportunity to partake in this patients plan of care. Please do not hesitate to page with questions. We will sign off as we anticipate pt to discharge today.     Jennifer Obando PA-C   Hematology/Oncology   Pager: [phone]  ___________________________________________________________________    Interval History:    No acute events overnight, afebrile. Jennifer reports feeling good today, has used her PCA button minimally since yesterday afternoon. She feels ready to discharge to home today. She confirmed plan for more consistent timing of dosing when she gets home and that she will take it with food.      Physical Exam:    /74 (BP Location: Left arm)   Pulse 85   Temp 98  F (36.7  C) (Oral)   Resp 16   Wt 72.2 kg (159 lb 2.8 oz)   SpO2 98%   BMI 27.32 kg/m      General: lying in bed, no acute distress. Smiling and conversant  HEENT: sclera anicteric, MMM  Resp: Breathing even  and non-labored on RA.   MSK: Known R hand contracture.   Skin: no rashes on limited exam  Neuro: Alert and interactive, speech normal      Labs & Studies:  ROUTINE LABS (Last four results):  CMP  Recent Labs   Lab 05/10/21  0442 05/09/21  0556 05/08/21  0315 05/07/21  2134 05/07/21  0605    137 136  --  136   POTASSIUM 3.9 3.9 3.8 3.4 3.3*   CHLORIDE 108 108 107  --  106   CO2 23 23 23  --  24   ANIONGAP 6 6 6  --  6   GLC 83 74 91  --  98   BUN 5* 5* 4*  --  5*   CR 0.47* 0.43* 0.49*  --  0.42*   GFRESTIMATED >90 >90 >90  --  >90   GFRESTBLACK >90 >90 >90  --  >90   MICAH 8.9 9.0 8.4*  --  8.5   PROTTOTAL 7.7 7.5 7.3  --  7.0   ALBUMIN 2.6* 2.6* 2.6*  --  2.6*   BILITOTAL 0.9 1.1 1.1  --  1.4*   ALKPHOS 86 87 88  --  87   AST 75* 70* 41  --  36   ALT 43 38 28  --  28     CBC  Recent Labs   Lab 05/10/21  0442 05/09/21  0556 05/08/21  0315 05/07/21  0605   WBC 7.6 9.2 10.0 13.9*   RBC 2.90* 2.91* 2.69* 2.58*   HGB 8.2* 8.5* 7.5* 7.3*   HCT 26.3* 26.8* 24.2* 22.9*   MCV 91 92 90 89   MCH 28.3 29.2 27.9 28.3   MCHC 31.2* 31.7 31.0* 31.9   RDW 19.5* 19.9* 20.2* 20.0*   * 601* 515* 395       Medications list for reference:  Current Facility-Administered Medications   Medication     acetaminophen (TYLENOL) tablet 650 mg     acetaminophen (TYLENOL) tablet 650 mg     albuterol (PROAIR HFA/PROVENTIL HFA/VENTOLIN HFA) 108 (90 Base) MCG/ACT inhaler 2 puff     albuterol (PROVENTIL) neb solution 2.5 mg     ARIPiprazole (ABILIFY) tablet 2 mg     celecoxib (celeBREX) capsule 100 mg     deferasirox (JADENU) tablet 1,440 mg     diclofenac (VOLTAREN) 1 % topical gel 4 g     diphenhydrAMINE (BENADRYL) capsule 25 mg     fluticasone-vilanterol (BREO ELLIPTA) 200-25 MCG/INH inhaler 1 puff     hydroxyurea (HYDREA) capsule 2,000 mg     Lidocaine (LIDOCARE) 4 % Patch 1 patch    And     lidocaine patch in PLACE     lidocaine (LMX4) cream     lidocaine 1 % 0.1-1 mL     melatonin tablet 1 mg     methocarbamol (ROBAXIN) tablet 500  mg     morphine  PCA 1 mg/mL OPIOID TOLERANT     ondansetron (ZOFRAN) injection 4-8 mg     oxyCODONE IR (ROXICODONE) tablet 10 mg     Patient is already receiving anticoagulation with heparin, enoxaparin (LOVENOX), warfarin (COUMADIN)  or other anticoagulant medication     polyethylene glycol (MIRALAX) Packet 17 g     rivaroxaban ANTICOAGULANT (XARELTO) tablet 15 mg     senna-docusate (SENOKOT-S/PERICOLACE) 8.6-50 MG per tablet 1 tablet    Or     senna-docusate (SENOKOT-S/PERICOLACE) 8.6-50 MG per tablet 2 tablet     sertraline (ZOLOFT) tablet 200 mg     sodium chloride (PF) 0.9% PF flush 3 mL     sodium chloride (PF) 0.9% PF flush 3 mL     sodium chloride 0.9% infusion

## 2021-05-11 NOTE — PROGRESS NOTES
[facility]    Medicine Progress Note - Hospitalist Service, Gold        Date of Admission:  4/26/2021  Assessment & Plan          Jennifer Cervantes is a 22 yo F with PMHx of sickle cell anemia c/b CVA with residual RUE hemiparesis, and PE, iron overload 2/2 frequent transfusions, asthma, depression, and anxiety who presented with worsening pain, admitted on 4/26/2021 for acute sickle cell pain crisis, complicated by acute hypoxic respiratory failure due to acute chest syndrome.     #Sepsis from Acute chest syndrome, resolved   #Acute respiratory failure, resolved    HgbSS c/b frequent pain crisis, hx of CVA with RUE hemiparesis, iron overload, and DVT/PE. Presented with pain over right side on ribs and back, with elevated retic count consistent with sickle cell crisis, treated with supportive care and Morphine PCA. Hospital course complicated by hypoxia with increased O2 requirement, from 2-3L initially up to HFNC, and fevers, CXR with RLL concerning for pneumonia, with leukocytosis meeting sepsis criteria. Started on Ceftriaxone/Azithromycin. Lactic flat. COVID negative x2. TTE normal. Concern for acute chest s/p exchange transfusion on 4/30 with minimal improvement in oxygen requirements, but did improve fever curve.  With persistent hypoxia concern about another acute inflammatory or infectious process. Procalcitonin rising to 0.44. CT chest w/o contrast with diffuse GGO and consolidated opacities concerning for infection.  ID and pulmonary consulted with extensive infectious work up with negative RVP, strep pneumo and legionella urinary antigen, histo serum and urinary antigen, blasto urinary antigen, cryptococcus serum, Beta D glucan, galactomannan, CMV, VZV and HSV 1&2. Remaining studies still pending below.  Broadened antibiotics to Cefepime and Vanco (Vanco now discontinued after negative MRSA swab). Hematology recommended repeat transfusion exchanged on 5/4,  patient with significant improvement in respiratory status, now on RA. Pain improving, weaning down on pain medication.   -Appreciated ID and Pulmonary recs who have since signed off  -Continue Cefepime 2g Q8H for 7 day course  (End date 5/11/2021)  -Follow up repeat blood cultures on 5/7 NTGTD  -Follow up blasto antigen serum, coccidioides serum and urine antigen, Fungal ab.   -Hematology following, appreciate recs   -Pain management:                - Stop continuous rate                - Continue PCA at 0.8mg/20 min lockout               - Start Oxycodone 10mg Q4H PRN               -Celebrex 100 mg BID, Tylenol 650 mg Q6H PRN, voltaren gel QID PRN.   -Trend CBC, CRP and reticulocyte count  -Bowel regimen, miralax daily, senna-colace   -Benadryl PRN    -Continue hydroxyurea 2000 mg daily   -Acapella and pulmonary toilet   -Encourage ambulation     # Worsening PE - Admitted on 2/1-2/3/2021 with a new PE and started on DOAC (initally on rivaroxaban and later switched to Eliquis after finding RUE DVT for possible rivaroxaban failure). Patient reports compliance with anticoagulation. On this admission, pt found to have bilateral subsegmental perfusion defects on VQ scan, worse compared to prior VQ scan on 2/1.   -Hematology consulted and following   -Continue Xarelto 15 mg BID x 21 days followed by 20 mg daily with food  -Rivaroxaban 10a level to be repeated per Heme on 5/11     # Seroma - CT chest from 5/3 also showed howed walled off fluid collection in the superior left chest wall port, this was more suggestive of seroma (per CT report). No signs of inflammation around the port and blood cultures are all negative so far. US chest wall on 5/10 with small collection 2.1 x 0.8 x 2.4cm superior to port-a-cath.      # Thrombocytosis - Likely reactive from recent infection and acute chest syndrome.   -Trend CBC     # Hx of iron overload - Continue PTA Deferasirox 1440 mg daily      # Asthma - Continue PTA Symbicort  (formulary equivalent Breo Ellipta) and albuterol.  Currently stable on room air.      # Anxiety  # Depression   Mood stable. Continue PTA Sertraline 200 mg daily and Abilify 2 mg daily        Diet: Regular Diet Adult  Snacks/Supplements Adult: Ensure Max Protein; Between Meals  Snacks/Supplements Adult: Other; Please allow pt to order Ensure Max Vanilla prn; With Meals    DVT Prophylaxis: Xarelto  Lopez Catheter: not present  Code Status: Full Code           Disposition Plan   Expected discharge: 1-2 days, recommended to prior living arrangement once pain managed on oral medications and stable O2 requirements.  Entered: EITAN Lisa CNP 05/10/2021, 7:37 PM       The patient's care was discussed with the Attending Physician, Dr. Ginna Valle.    EITAN Lisa CNP  Hospitalist Service, [address]  Contact information available via [facility] Paging/Directory  Please see sign in/sign out for up to date coverage information  ______________________________________________________________________    Interval History    Jennifer is resting in bed.  She reports feeling pain, but denies chest pain or dyspnea.  No cough or fevers.      Data reviewed today: I reviewed all medications, new labs and imaging results over the last 24 hours.     Physical Exam   Vital Signs: Temp: 98  F (36.7  C) Temp src: Oral BP: 132/75 Pulse: 89   Resp: 18 SpO2: 95 % O2 Device: None (Room air)    Weight: 160 lbs 0 oz    GENERAL: Sleepy. Well nourished, well developed.  No acute distress.    HEENT: Normocephalic, atraumatic. Anicteric sclera. Mucous membranes moist.   CV: RRR. S1, S2. No murmurs appreciated.   RESPIRATORY: Effort normal on room air. Lungs CTAB with no wheezing, rales, or rhonchi.   GI: Abdomen soft and non distended, bowel sounds present x all 4 quadrants. No tenderness, rebound, or guarding.   NEUROLOGICAL: No focal deficits. Follows commands.    MUSCULOSKELETAL:  No joint swelling or tenderness. Moves all extremities.   EXTREMITIES: No gross deformities. No peripheral edema.   SKIN: Grossly warm, dry, and intact. No jaundice. No rashes.       Data   Recent Labs   Lab 05/10/21  0442 05/09/21  0556 05/08/21  0315   WBC 7.6 9.2 10.0   HGB 8.2* 8.5* 7.5*   MCV 91 92 90   * 601* 515*    137 136   POTASSIUM 3.9 3.9 3.8   CHLORIDE 108 108 107   CO2 23 23 23   BUN 5* 5* 4*   CR 0.47* 0.43* 0.49*   ANIONGAP 6 6 6   MICAH 8.9 9.0 8.4*   GLC 83 74 91   ALBUMIN 2.6* 2.6* 2.6*   PROTTOTAL 7.7 7.5 7.3   BILITOTAL 0.9 1.1 1.1   ALKPHOS 86 87 88   ALT 43 38 28   AST 75* 70* 41     Recent Results (from the past 24 hour(s))   US Extremity Non Vascular Left    Narrative    Exam: US EXTREMITY NON VASCULAR LEFT, 5/10/2021 3:39 PM    Indication: To assess fluid collection just superior to port on L  anterior chest    Comparison: None    Findings:   Ultrasound evaluation of the left upper extremity fluid collection.  Small left chest wall fluid collection superior to patient's  Port-A-Cath. Fluid collection measures 2.1 x 0.8 x 2.4 cm.      Impression    Impression: Small collection of simple appearing fluid superior to  patient's Port-A-Cath in the left chest wall.    I have personally reviewed the examination and initial interpretation  and I agree with the findings.    MANSI GAFFNEY MD     Medications     morphine       - MEDICATION INSTRUCTIONS -       sodium chloride 10 mL/hr at 05/09/21 2300       acetaminophen  650 mg Oral Q4H     ARIPiprazole  2 mg Oral Daily     ceFEPIme (MAXIPIME) IV  2 g Intravenous Q8H     celecoxib  100 mg Oral BID     deferasirox  1,440 mg Oral QPM     fluticasone-vilanterol  1 puff Inhalation QPM     hydroxyurea  2,000 mg Oral Daily     lidocaine  1 patch Transdermal Q24h    And     lidocaine   Transdermal Q8H     methocarbamol  500 mg Oral 4x Daily     polyethylene glycol  17 g Oral Daily     rivaroxaban ANTICOAGULANT  15 mg Oral BID w/meals      senna-docusate  1 tablet Oral BID    Or     senna-docusate  2 tablet Oral BID     sertraline  200 mg Oral Daily     sodium chloride (PF)  3 mL Intracatheter Q8H

## 2021-05-11 NOTE — PLAN OF CARE
Cares [id number]. Alert, oriented. VSS on RA. Up SBA to commode, voids spontaneously. BM x1 this shift. Voids spontaneously in bedside commode. R PIV x2 d/c'd, port not accessed. PCA morphine d/c'd this shift, pt tolerating well. PRN oxy also available. Tolerating reg diet. No acute events. Continue to monitor.

## 2021-05-12 ENCOUNTER — VIRTUAL VISIT (OUTPATIENT)
Dept: ONCOLOGY | Facility: CLINIC | Age: 21
End: 2021-05-12
Attending: PHYSICIAN ASSISTANT
Payer: COMMERCIAL

## 2021-05-12 ENCOUNTER — HOME INFUSION (PRE-WILLOW HOME INFUSION) (OUTPATIENT)
Dept: PHARMACY | Facility: CLINIC | Age: 21
End: 2021-05-12

## 2021-05-12 ENCOUNTER — MEDICAL CORRESPONDENCE (OUTPATIENT)
Dept: HEALTH INFORMATION MANAGEMENT | Facility: CLINIC | Age: 21
End: 2021-05-12

## 2021-05-12 DIAGNOSIS — R29.898 RIGHT LEG WEAKNESS: ICD-10-CM

## 2021-05-12 DIAGNOSIS — D57.00 SICKLE CELL PAIN CRISIS (H): Primary | ICD-10-CM

## 2021-05-12 DIAGNOSIS — I63.9 CEREBRAL INFARCTION, UNSPECIFIED MECHANISM (H): ICD-10-CM

## 2021-05-12 PROCEDURE — 999N001193 HC VIDEO/TELEPHONE VISIT; NO CHARGE

## 2021-05-12 PROCEDURE — 99214 OFFICE O/P EST MOD 30 MIN: CPT | Mod: GT | Performed by: PHYSICIAN ASSISTANT

## 2021-05-12 NOTE — Clinical Note
"    5/12/2021         RE: Jennifer Cervantes  [address]  [facility] [address]        Dear Colleague,    Thank you for referring your patient, Jennifer Cervantes, to the [facility]. Please see a copy of my visit note below.    Jennifer is a 21 year old who is being evaluated via a billable video visit.      How would you like to obtain your AVS? MyChart  If the video visit is dropped, the invitation should be resent by: Text to cell phone: [phone]  Will anyone else be joining your video visit? No     Vitals - Patient Reported  Weight (Patient Reported): 71.7 kg (158 lb)  Height (Patient Reported): 162.6 cm (5' 4\")  BMI (Based on Pt Reported Ht/Wt): 27.12  Pain Score: No Pain (0)    Shavonne MEADOWS    {If patient encounters technical issues they should call [phone]}    Video Start Time: 5:25pm    Video-Visit Details    Type of service:  Video Visit    Video End Time: 5:44pm    Originating Location (pt. 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      She was admitted 2/1/21-2/3/21 with a new PE, started on Rivaroxaban. Switched to Eliquis 3/25/21 with RUE DVT.    She was admitted 4/26/21-5/11/21 with sickle cell pain crisis complicated by worsening PE in setting of low Apixaban levels, acute hypoxic respiratory failure, " pneumonia, acute chest syndrome s/p exchange transfusion on 4/30 and 5/4. After 2nd exchange her oxygen requirement dramatically improved from 20L to 1-2L 5/5 and she was off oxygen as of 5/6.     She was called today for oncology follow-up.     Interval History:  Jennifer was called today for follow-up. She overall is doing well since hospital discharge-having some pain in her back but able to manage with OxyContin/Oxycodone. She denies fevers, headaches, dizziness, chest pain, SOB, cough, nausea, vomiting, abdominal pain, bowel or bladder concerns, swelling, bleeding issues. She is eating and drinking well. Port is no longer swollen or tender. She is currently at a hotel because she needed a break from her family but plans to stay at home and continue looking for a place with her cousin. States overall her mood is OK and she isn't too upset over her hospitalization.     Current Outpatient Medications   Medication Sig Dispense Refill     acetaminophen (TYLENOL) 325 MG tablet Take 2 tablets (650 mg) by mouth every 6 hours as needed for mild pain 120 tablet 3     albuterol (PROAIR HFA/PROVENTIL HFA/VENTOLIN HFA) 108 (90 Base) MCG/ACT inhaler Inhale 2 puffs into the lungs every 6 hours as needed for shortness of breath / dyspnea or wheezing 8.5 g 3     albuterol (PROVENTIL) (2.5 MG/3ML) 0.083% neb solution Take 1 vial (2.5 mg) by nebulization every 6 hours as needed for shortness of breath / dyspnea or wheezing 12 mL 4     ARIPiprazole (ABILIFY) 2 MG tablet Take 1 tablet (2 mg) by mouth daily 30 tablet 3     budesonide-formoterol (SYMBICORT) 160-4.5 MCG/ACT Inhaler Inhale 1 puff into the lungs every evening 10.2 g 3     celecoxib (CELEBREX) 100 MG capsule Take 1 capsule (100 mg) by mouth 2 times daily 60 capsule 3     diclofenac (VOLTAREN) 1 % topical gel Apply 4 g topically 4 times daily as needed for moderate pain Apply to back, legs, and arms for pain 150 g 3     diphenhydrAMINE (BENADRYL) 25 MG capsule Take 1-2  capsules (25-50 mg) by mouth nightly as needed for sleep 60 capsule 3     EPINEPHrine (ANY BX GENERIC EQUIV) 0.3 MG/0.3ML injection 2-pack Inject 0.3 mLs (0.3 mg) into the muscle as needed for anaphylaxis 1 each 1     Hydroxyurea 1000 MG TABS Take 2,000 mg by mouth daily 60 tablet 3     JADENU 360 MG tablet Take 4 tablets (1,440 mg) by mouth every evening 30 tablet 0     medroxyPROGESTERone (DEPO-PROVERA) 150 MG/ML IM injection Inject 150 mg into the muscle       methocarbamol (ROBAXIN) 500 MG tablet Take 1 tablet (500 mg) by mouth 4 times daily 120 tablet 2     naloxone (NARCAN) 4 MG/0.1ML nasal spray Spray 1 spray (4 mg) into one nostril alternating nostrils once as needed for opioid reversal every 2-3 minutes until assistance arrives 0.2 mL 1     ondansetron (ZOFRAN) 8 MG tablet        oxyCODONE (OXYCONTIN) 10 MG 12 hr tablet Take 1 tablet (10 mg) by mouth every 12 hours 60 tablet 0     oxyCODONE IR (ROXICODONE) 15 MG tablet Take 1 tablet (15 mg) by mouth every 6 hours as needed for severe pain 60 tablet 0     [START ON 5/29/2021] rivaroxaban ANTICOAGULANT (XARELTO ANTICOAGULANT) 20 MG TABS tablet Take 1 tablet (20 mg) by mouth daily (with dinner) Starting on 5/29 after completing your 15mg twice daily dosing on 5/28 30 tablet 2     rivaroxaban ANTICOAGULANT (XARELTO) 15 MG TABS tablet Take 1 tablet (15 mg) by mouth 2 times daily (with meals) for 17 days 34 tablet 0     sertraline (ZOLOFT) 100 MG tablet Take 2 tablets (200 mg) by mouth daily 180 tablet 3     Physical Examination:  There were no vitals taken for this visit.  Wt Readings from Last 10 Encounters:   05/10/21 72.2 kg (159 lb 2.8 oz)   04/26/21 75.8 kg (167 lb 1.6 oz)   04/25/21 77.6 kg (171 lb)   04/24/21 77.9 kg (171 lb 11.2 oz)   04/22/21 76.2 kg (168 lb)   04/21/21 76.3 kg (168 lb 3.4 oz)   04/16/21 76.3 kg (168 lb 3.4 oz)   04/16/21 76.3 kg (168 lb 3.2 oz)   04/12/21 75.2 kg (165 lb 11.2 oz)   04/07/21 73.9 kg (163 lb)     Video physical  exam  General: Patient appears well in no acute distress.   Skin: No visualized rash or lesions on visualized skin  Eyes: EOMI, no erythema, sclera icterus or discharge noted  Resp: Appears to be breathing comfortably without accessory muscle usage, speaking in full sentences, no cough  MSK: Appears to have normal range of motion based on visualized movements  Neurologic: No apparent tremors, facial movements symmetric  Psych: affect normal, alert and oriented    The rest of a comprehensive physical examination is deferred due to PHE (public health emergency) video restrictions    Laboratory Data:  No new labs to review, did review labs from hospital course      Assessment and Plan:  IN PROGRESS    Quick Note  -Continue Xarelto  -Labs and Andrei in person in one week  -Call if pain and need infusion prior to that   -Refills not needed until next week  -[facility] to continue to help arrange home Desferal     Andrei Machado PA-C  [facility]  [address] [place] [address]  [phone]        Again, thank you for allowing me to participate in the care of your patient.        Sincerely,        RONALDO Allan

## 2021-05-12 NOTE — PROGRESS NOTES
"Jennifer is a 21 year old who is being evaluated via a billable video visit.      How would you like to obtain your AVS? MyChart  If the video visit is dropped, the invitation should be resent by: Text to cell phone: [phone]  Will anyone else be joining your video visit? No     Vitals - Patient Reported  Weight (Patient Reported): 71.7 kg (158 lb)  Height (Patient Reported): 162.6 cm (5' 4\")  BMI (Based on Pt Reported Ht/Wt): 27.12  Pain Score: No Pain (0)    Shavonne MEADOWS        Video Start Time: 5:25pm    Video-Visit Details    Type of service:  Video Visit    Video End Time: 5:44pm    Originating Location (pt. 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      She was admitted 2/1/21-2/3/21 with a new PE, started on Rivaroxaban. Switched to Eliquis 3/25/21 with RUE DVT.    She was admitted 4/26/21-5/11/21 with sickle cell pain crisis complicated by worsening PE in setting of low Apixaban levels, acute hypoxic respiratory failure, pneumonia, acute chest syndrome s/p exchange transfusion on 4/30 and 5/4. After 2nd exchange her oxygen requirement dramatically improved from 20L to 1-2L 5/5 and she was off oxygen as of 5/6.     She was called today for oncology follow-up.     Interval History:  Jennifer was called today for follow-up. She overall is doing well since " hospital discharge-having some pain in her back but able to manage with OxyContin/Oxycodone. She denies fevers, headaches, dizziness, chest pain, SOB, cough, nausea, vomiting, abdominal pain, bowel or bladder concerns, swelling, bleeding issues. She is eating and drinking well. Port is no longer swollen or tender. She is currently at a hotel because she needed a break from her family but plans to stay at home and continue looking for a place with her cousin. States overall her mood is OK and she isn't too upset over her hospitalization.     Current Outpatient Medications   Medication Sig Dispense Refill     acetaminophen (TYLENOL) 325 MG tablet Take 2 tablets (650 mg) by mouth every 6 hours as needed for mild pain 120 tablet 3     albuterol (PROAIR HFA/PROVENTIL HFA/VENTOLIN HFA) 108 (90 Base) MCG/ACT inhaler Inhale 2 puffs into the lungs every 6 hours as needed for shortness of breath / dyspnea or wheezing 8.5 g 3     albuterol (PROVENTIL) (2.5 MG/3ML) 0.083% neb solution Take 1 vial (2.5 mg) by nebulization every 6 hours as needed for shortness of breath / dyspnea or wheezing 12 mL 4     ARIPiprazole (ABILIFY) 2 MG tablet Take 1 tablet (2 mg) by mouth daily 30 tablet 3     budesonide-formoterol (SYMBICORT) 160-4.5 MCG/ACT Inhaler Inhale 1 puff into the lungs every evening 10.2 g 3     celecoxib (CELEBREX) 100 MG capsule Take 1 capsule (100 mg) by mouth 2 times daily 60 capsule 3     diclofenac (VOLTAREN) 1 % topical gel Apply 4 g topically 4 times daily as needed for moderate pain Apply to back, legs, and arms for pain 150 g 3     diphenhydrAMINE (BENADRYL) 25 MG capsule Take 1-2 capsules (25-50 mg) by mouth nightly as needed for sleep 60 capsule 3     EPINEPHrine (ANY BX GENERIC EQUIV) 0.3 MG/0.3ML injection 2-pack Inject 0.3 mLs (0.3 mg) into the muscle as needed for anaphylaxis 1 each 1     Hydroxyurea 1000 MG TABS Take 2,000 mg by mouth daily 60 tablet 3     JADENU 360 MG tablet Take 4 tablets (1,440 mg) by  mouth every evening 30 tablet 0     medroxyPROGESTERone (DEPO-PROVERA) 150 MG/ML IM injection Inject 150 mg into the muscle       methocarbamol (ROBAXIN) 500 MG tablet Take 1 tablet (500 mg) by mouth 4 times daily 120 tablet 2     naloxone (NARCAN) 4 MG/0.1ML nasal spray Spray 1 spray (4 mg) into one nostril alternating nostrils once as needed for opioid reversal every 2-3 minutes until assistance arrives 0.2 mL 1     ondansetron (ZOFRAN) 8 MG tablet        oxyCODONE (OXYCONTIN) 10 MG 12 hr tablet Take 1 tablet (10 mg) by mouth every 12 hours 60 tablet 0     oxyCODONE IR (ROXICODONE) 15 MG tablet Take 1 tablet (15 mg) by mouth every 6 hours as needed for severe pain 60 tablet 0     [START ON 5/29/2021] rivaroxaban ANTICOAGULANT (XARELTO ANTICOAGULANT) 20 MG TABS tablet Take 1 tablet (20 mg) by mouth daily (with dinner) Starting on 5/29 after completing your 15mg twice daily dosing on 5/28 30 tablet 2     rivaroxaban ANTICOAGULANT (XARELTO) 15 MG TABS tablet Take 1 tablet (15 mg) by mouth 2 times daily (with meals) for 17 days 34 tablet 0     sertraline (ZOLOFT) 100 MG tablet Take 2 tablets (200 mg) by mouth daily 180 tablet 3     Physical Examination:  There were no vitals taken for this visit.  Wt Readings from Last 10 Encounters:   05/10/21 72.2 kg (159 lb 2.8 oz)   04/26/21 75.8 kg (167 lb 1.6 oz)   04/25/21 77.6 kg (171 lb)   04/24/21 77.9 kg (171 lb 11.2 oz)   04/22/21 76.2 kg (168 lb)   04/21/21 76.3 kg (168 lb 3.4 oz)   04/16/21 76.3 kg (168 lb 3.4 oz)   04/16/21 76.3 kg (168 lb 3.2 oz)   04/12/21 75.2 kg (165 lb 11.2 oz)   04/07/21 73.9 kg (163 lb)     Video physical exam  General: Patient appears well in no acute distress.   Skin: No visualized rash or lesions on visualized skin  Eyes: EOMI, no erythema, sclera icterus or discharge noted  Resp: Appears to be breathing comfortably without accessory muscle usage, speaking in full sentences, no cough  MSK: Appears to have normal range of motion based on  visualized movements  Neurologic: No apparent tremors, facial movements symmetric  Psych: affect normal, alert and oriented    The rest of a comprehensive physical examination is deferred due to PHE (public health emergency) video restrictions    Laboratory Data:  No new labs to review, did review labs from hospital course      Assessment and Plan:  1. Sickle Cell Disease  HgbSS, recently complicated by prolonged hospitalization for complicated pain crisis with worsening PE, acute chest, possible pneumonia, acute hypoxic respiratory failure. Did require exchange x 2 inpatient. Now doing well after discharge.      Labs: Reviewed from hospital stay, hgb was OK at 8.2 on 5/10. Will repeat labs in one week.     Meds: Stopped Voxeletor. Continue Hydrea 2000mg daily. Contineu Jadenu 4 tablets daily. Working with pharmacy to do high dose Desderal x 48 hours over the weekend-per patient [facility] is coming out this week to discuss further.      Pain Control: Continue OxyContin 10mg BID. Continue Oxycodone to 15mg PRN and this is working well. Continue Tylenol PRN, and Celebrex PRN. Off Naproxen. Has Robaxin PRN.     Port seroma resolved and no issues now.      Follow-up: Will see her in one week in person with labs.      2. Neuro  History of stroke, no new concerns. ASA on hold while on anticoagulation for PE.      PT referral for right leg weakness-placed again so she can schedule.     3. Psych  History of anxiety and depression. Continue Sertraline 200mg daily and Abilify 2mg daily. Mood brighter today, states she is doing OK with recent prolonged hospitalization.     4. Pulm  History of asthma, continue Symbicort and Albuterol PRN.      Bilateral PE diagnosed 2/1/21 was on Xarelto. RUE DVT 3/25/21 switched to Eliquis. Worsening PE diagnosed 4/27/21, heme consult inpatient, Eliquis levels were low, switched back to Xarelto and levels were WNL. Now on 15mg BID until 5/29, then switch to 20mg daily. Confirmed she is taking with  food. No recurrent signs of VTE.    Acute chest/hypoxic respiratory failure/pneumonia: Issues inpatient, received abx and exchange x 2 with improvement in O2. Discharged without oxygen. Denies any breathing concerns or fevers.    Infectious work-up from hospital: RVP, strep pneumo and legionella urinary antigen, histo serum and urinary antigen, blasto urinary antigen, cryptococcus serum, Beta D glucan, galactomannan, CMV, VZV and HSV 1&2, all of which were negative.  Coccidioides negative.     Did not discuss sickle cell health maintenance today.     30 minutes spent on the date of the encounter doing chart review, review of test results, patient visit and documentation     Andrei Machado PA-C  [facility]  [address], [place] [id number] [phone]

## 2021-05-12 NOTE — CONSULTS
Laboratory Medicine and Pathology  Transfusion Medicine- Transfusion Reaction    Jennifer Cervantes MRN# [id number]   YOB: 2000 Age: 21 year old   Date of Reaction: 4/30/2021       Transfusion Reaction Evaluation   Impression  - Does not meet criteria for any transfusion reaction    Recommendation    1. Transfuse as needed.    ----------------------------------    History  Jennifer Vazquez a 21 year old female with a past medical history of sickle cell disease who was admitted for acute chest syndrome 4/28/2021. Additionally, her hospital course was complicated by a recurrent pulmonary embolism seen on V/Q scan, requiring change in her anticoagulation.    The transfusion service was consulted for a red blood cell exchange, which was performed on 4/30/2021. Of note, prior to the exchange the patient was on ~15L of oxygen to sat 90-91% with high respiratory rate. Chest xray was negative for pleural effusion. The exchange was performed with a RBC prime and six units of Hgb S negative, Rh and [place] matched units. The patient tolerated the procedure well, with a residual hgb S of 25%.    Post-procedure, there was only mild improvement in the patient's respiratory status, contrary to the results expected by the primary team. A chest xray taken on 4/29 was also negative for significant pleural effusion. The patient did not improve over the weekend, and later imagine showed the development of ground glass and consolidative opacities on CT. Bronchoscopy on 5/3 was declined due to the patient's O2 needs, with a differential on a pulmonary consult favoring infection, alveolar hemorrhage, or a transfusion-related reaction. It was this last concern which prompted this reaction write up.     The transfusion service was again approached on 5/4 for an additional red cell exchange, which we performed that evening. Shortly after the procedure completed, there was a dramatic improvement in the patient's O2 needs (down from 20L  to 2L of O2 by the next AM), and she was off oxygen by . She was discharged home on .    From a transfusion standpoint, the two major entities of respiratory related reactions are transfusion associated circulatory overload (TACO) and transfusion related acute lung injury (TRALI). TACO is, essentially, volume overloading the patient, and is not thought to be a complication of a red cell exchange, which is a isovolemic process. The other item in the differential, TRALI, is thought to be mediated by donor antibodies to recipient HLA antigens or neutrophil antigens. TRALI typically presents in a very acute fashion, and often progresses to diffuse alveolar damage/acute respiratory distress syndrome, often requiring ventilatory support and extensive high intensity care. In the case of Jennifer Cervantes, there was no acute worsening of her respiratory status following her initial red cell exchange, nor were there radiographic findings on her chest xray the next morning. This is inconsistent with the presentation of TRALI. Furthermore, the resolution of her respiratory distress with the second red cell exchange is also inconsistent with TRALI, and is much more suggestive of a unresolved acute chest syndrome/sickle cell crisis.    Reported Symptoms  Hypoxemia  Shortness of Breath      Blood Bank Investigation  Product Type: RBCs  Unit Number: [id number] -[id number], -[id number], -[id number], -[id number], -[id number], -[id number], -[id number]  Amount Remainin mL for all  Post-Transfusion Clerical Check: Correct  ABO/Rh: The unit types were O- and the patient was O-. The units were compatible.      [facility]  Case Definition: Other / unknown  Severity: N/A  Imputability: N/A    Eric Christy MD  2021 5:07 PM  Transfusion Medicine Fellow  Pager: [phone]              No

## 2021-05-12 NOTE — LETTER
"    5/12/2021         RE: Jennifer Cervantes  [address]  [facility] [address]      Jennifer is a 21 year old who is being evaluated via a billable video visit.      How would you like to obtain your AVS? MyChart  If the video visit is dropped, the invitation should be resent by: Text to cell phone: [phone]  Will anyone else be joining your video visit? No     Vitals - Patient Reported  Weight (Patient Reported): 71.7 kg (158 lb)  Height (Patient Reported): 162.6 cm (5' 4\")  BMI (Based on Pt Reported Ht/Wt): 27.12  Pain Score: No Pain (0)    Shavonne MEADOWS        Video Start Time: 5:25pm    Video-Visit Details    Type of service:  Video Visit    Video End Time: 5:44pm    Originating Location (pt. 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      She was admitted 2/1/21-2/3/21 with a new PE, started on Rivaroxaban. Switched to Eliquis 3/25/21 with RUE DVT.    She was admitted 4/26/21-5/11/21 with sickle cell pain crisis complicated by worsening PE in setting of low Apixaban levels, acute hypoxic respiratory failure, pneumonia, acute chest syndrome s/p exchange transfusion on 4/30 and 5/4. After 2nd exchange her oxygen requirement dramatically improved from 20L to 1-2L 5/5 and she was off oxygen as of 5/6.     She was called today for oncology follow-up.     Interval " History:  Jennifer was called today for follow-up. She overall is doing well since hospital discharge-having some pain in her back but able to manage with OxyContin/Oxycodone. She denies fevers, headaches, dizziness, chest pain, SOB, cough, nausea, vomiting, abdominal pain, bowel or bladder concerns, swelling, bleeding issues. She is eating and drinking well. Port is no longer swollen or tender. She is currently at a hotel because she needed a break from her family but plans to stay at home and continue looking for a place with her cousin. States overall her mood is OK and she isn't too upset over her hospitalization.     Current Outpatient Medications   Medication Sig Dispense Refill     acetaminophen (TYLENOL) 325 MG tablet Take 2 tablets (650 mg) by mouth every 6 hours as needed for mild pain 120 tablet 3     albuterol (PROAIR HFA/PROVENTIL HFA/VENTOLIN HFA) 108 (90 Base) MCG/ACT inhaler Inhale 2 puffs into the lungs every 6 hours as needed for shortness of breath / dyspnea or wheezing 8.5 g 3     albuterol (PROVENTIL) (2.5 MG/3ML) 0.083% neb solution Take 1 vial (2.5 mg) by nebulization every 6 hours as needed for shortness of breath / dyspnea or wheezing 12 mL 4     ARIPiprazole (ABILIFY) 2 MG tablet Take 1 tablet (2 mg) by mouth daily 30 tablet 3     budesonide-formoterol (SYMBICORT) 160-4.5 MCG/ACT Inhaler Inhale 1 puff into the lungs every evening 10.2 g 3     celecoxib (CELEBREX) 100 MG capsule Take 1 capsule (100 mg) by mouth 2 times daily 60 capsule 3     diclofenac (VOLTAREN) 1 % topical gel Apply 4 g topically 4 times daily as needed for moderate pain Apply to back, legs, and arms for pain 150 g 3     diphenhydrAMINE (BENADRYL) 25 MG capsule Take 1-2 capsules (25-50 mg) by mouth nightly as needed for sleep 60 capsule 3     EPINEPHrine (ANY BX GENERIC EQUIV) 0.3 MG/0.3ML injection 2-pack Inject 0.3 mLs (0.3 mg) into the muscle as needed for anaphylaxis 1 each 1     Hydroxyurea 1000 MG TABS Take 2,000 mg by  mouth daily 60 tablet 3     JADENU 360 MG tablet Take 4 tablets (1,440 mg) by mouth every evening 30 tablet 0     medroxyPROGESTERone (DEPO-PROVERA) 150 MG/ML IM injection Inject 150 mg into the muscle       methocarbamol (ROBAXIN) 500 MG tablet Take 1 tablet (500 mg) by mouth 4 times daily 120 tablet 2     naloxone (NARCAN) 4 MG/0.1ML nasal spray Spray 1 spray (4 mg) into one nostril alternating nostrils once as needed for opioid reversal every 2-3 minutes until assistance arrives 0.2 mL 1     ondansetron (ZOFRAN) 8 MG tablet        oxyCODONE (OXYCONTIN) 10 MG 12 hr tablet Take 1 tablet (10 mg) by mouth every 12 hours 60 tablet 0     oxyCODONE IR (ROXICODONE) 15 MG tablet Take 1 tablet (15 mg) by mouth every 6 hours as needed for severe pain 60 tablet 0     [START ON 5/29/2021] rivaroxaban ANTICOAGULANT (XARELTO ANTICOAGULANT) 20 MG TABS tablet Take 1 tablet (20 mg) by mouth daily (with dinner) Starting on 5/29 after completing your 15mg twice daily dosing on 5/28 30 tablet 2     rivaroxaban ANTICOAGULANT (XARELTO) 15 MG TABS tablet Take 1 tablet (15 mg) by mouth 2 times daily (with meals) for 17 days 34 tablet 0     sertraline (ZOLOFT) 100 MG tablet Take 2 tablets (200 mg) by mouth daily 180 tablet 3     Physical Examination:  There were no vitals taken for this visit.  Wt Readings from Last 10 Encounters:   05/10/21 72.2 kg (159 lb 2.8 oz)   04/26/21 75.8 kg (167 lb 1.6 oz)   04/25/21 77.6 kg (171 lb)   04/24/21 77.9 kg (171 lb 11.2 oz)   04/22/21 76.2 kg (168 lb)   04/21/21 76.3 kg (168 lb 3.4 oz)   04/16/21 76.3 kg (168 lb 3.4 oz)   04/16/21 76.3 kg (168 lb 3.2 oz)   04/12/21 75.2 kg (165 lb 11.2 oz)   04/07/21 73.9 kg (163 lb)     Video physical exam  General: Patient appears well in no acute distress.   Skin: No visualized rash or lesions on visualized skin  Eyes: EOMI, no erythema, sclera icterus or discharge noted  Resp: Appears to be breathing comfortably without accessory muscle usage, speaking in full  sentences, no cough  MSK: Appears to have normal range of motion based on visualized movements  Neurologic: No apparent tremors, facial movements symmetric  Psych: affect normal, alert and oriented    The rest of a comprehensive physical examination is deferred due to PHE (public health emergency) video restrictions    Laboratory Data:  No new labs to review, did review labs from hospital course      Assessment and Plan:  1. Sickle Cell Disease  HgbSS, recently complicated by prolonged hospitalization for complicated pain crisis with worsening PE, acute chest, possible pneumonia, acute hypoxic respiratory failure. Did require exchange x 2 inpatient. Now doing well after discharge.      Labs: Reviewed from hospital stay, hgb was OK at 8.2 on 5/10. Will repeat labs in one week.     Meds: Stopped Voxeletor. Continue Hydrea 2000mg daily. Contineu Jadenu 4 tablets daily. Working with pharmacy to do high dose Desderal x 48 hours over the weekend-per patient [facility] is coming out this week to discuss further.      Pain Control: Continue OxyContin 10mg BID. Continue Oxycodone to 15mg PRN and this is working well. Continue Tylenol PRN, and Celebrex PRN. Off Naproxen. Has Robaxin PRN.     Port seroma resolved and no issues now.      Follow-up: Will see her in one week in person with labs.      2. Neuro  History of stroke, no new concerns. ASA on hold while on anticoagulation for PE.      PT referral for right leg weakness-placed again so she can schedule.     3. Psych  History of anxiety and depression. Continue Sertraline 200mg daily and Abilify 2mg daily. Mood brighter today, states she is doing OK with recent prolonged hospitalization.     4. Pulm  History of asthma, continue Symbicort and Albuterol PRN.      Bilateral PE diagnosed 2/1/21 was on Xarelto. RUE DVT 3/25/21 switched to Eliquis. Worsening PE diagnosed 4/27/21, heme consult inpatient, Eliquis levels were low, switched back to Xarelto and levels were WNL. Now on  15mg BID until 5/29, then switch to 20mg daily. Confirmed she is taking with food. No recurrent signs of VTE.    Acute chest/hypoxic respiratory failure/pneumonia: Issues inpatient, received abx and exchange x 2 with improvement in O2. Discharged without oxygen. Denies any breathing concerns or fevers.    Infectious work-up from hospital: RVP, strep pneumo and legionella urinary antigen, histo serum and urinary antigen, blasto urinary antigen, cryptococcus serum, Beta D glucan, galactomannan, CMV, VZV and HSV 1&2, all of which were negative.  Coccidioides negative.     Did not discuss sickle cell health maintenance today.     30 minutes spent on the date of the encounter doing chart review, review of test results, patient visit and documentation     Andrei Machado PA-C  [facility]  [address] [place] [address]  [phone]          RONALDO Allan

## 2021-05-12 NOTE — PROGRESS NOTES
The patient is scheduled for clinic follow up within 24-48 hours of hospital discharge. No post-hospital call is needed at this time.    Name: Jennifer Cervantes MRN: [id number]   Date: 5/12/2021 Status: [facility]   Time: 5:10 PM Length: 50   Visit Type: VIDEO VISIT RETURN [2701] YOLA: [id number]   Provider: Andrei Machado PA Department:  ONCOLOGY ADULT         Naida Fonseca CMA, JAIN  Post Hospital Discharge Team

## 2021-05-13 ENCOUNTER — HOME INFUSION (PRE-WILLOW HOME INFUSION) (OUTPATIENT)
Dept: PHARMACY | Facility: CLINIC | Age: 21
End: 2021-05-13

## 2021-05-13 ENCOUNTER — PATIENT OUTREACH (OUTPATIENT)
Dept: ONCOLOGY | Facility: CLINIC | Age: 21
End: 2021-05-13

## 2021-05-13 LAB
BACTERIA SPEC CULT: NO GROWTH
BACTERIA SPEC CULT: NO GROWTH
Lab: NORMAL
SPECIMEN SOURCE: NORMAL
SPECIMEN SOURCE: NORMAL

## 2021-05-13 NOTE — PROGRESS NOTES
Writer placed call to Jennifer to see how she is doing since discharging from hospital. Was admitted from 4/26-5/11 for Sepsis 2/2 acute chest syndrome, sickle cell pain crisis and other Dx. No answer received, left detailed VM encouraging her to call [facility] to set up home chelation if it hadn't been done already. Set up for clinic visit on 5/19.

## 2021-05-15 ENCOUNTER — HOME INFUSION (PRE-WILLOW HOME INFUSION) (OUTPATIENT)
Dept: PHARMACY | Facility: CLINIC | Age: 21
End: 2021-05-15

## 2021-05-17 LAB
Lab: NORMAL
Lab: NORMAL

## 2021-05-17 NOTE — PROGRESS NOTES
This is a recent snapshot of the patient's [facility] medical record.  For current drug dose and complete information and questions, call [phone] or In Basket pool, fv home infusion ([id number])  CSN Number:  [id number]

## 2021-05-18 ENCOUNTER — NURSE TRIAGE (OUTPATIENT)
Dept: ONCOLOGY | Facility: CLINIC | Age: 21
End: 2021-05-18

## 2021-05-18 ENCOUNTER — PATIENT OUTREACH (OUTPATIENT)
Dept: CARE COORDINATION | Facility: CLINIC | Age: 21
End: 2021-05-18

## 2021-05-18 ENCOUNTER — INFUSION THERAPY VISIT (OUTPATIENT)
Dept: TRANSPLANT | Facility: CLINIC | Age: 21
End: 2021-05-18
Attending: PEDIATRICS
Payer: COMMERCIAL

## 2021-05-18 VITALS
TEMPERATURE: 98.9 F | SYSTOLIC BLOOD PRESSURE: 118 MMHG | RESPIRATION RATE: 16 BRPM | OXYGEN SATURATION: 95 % | DIASTOLIC BLOOD PRESSURE: 79 MMHG | HEART RATE: 96 BPM

## 2021-05-18 DIAGNOSIS — D57.00 SICKLE CELL PAIN CRISIS (H): Primary | ICD-10-CM

## 2021-05-18 PROCEDURE — 96361 HYDRATE IV INFUSION ADD-ON: CPT

## 2021-05-18 PROCEDURE — 250N000011 HC RX IP 250 OP 636: Performed by: PEDIATRICS

## 2021-05-18 PROCEDURE — 258N000003 HC RX IP 258 OP 636: Performed by: PEDIATRICS

## 2021-05-18 PROCEDURE — 96376 TX/PRO/DX INJ SAME DRUG ADON: CPT

## 2021-05-18 PROCEDURE — 96374 THER/PROPH/DIAG INJ IV PUSH: CPT

## 2021-05-18 RX ORDER — HEPARIN SODIUM,PORCINE 10 UNIT/ML
5 VIAL (ML) INTRAVENOUS
Status: CANCELLED | OUTPATIENT
Start: 2021-05-18

## 2021-05-18 RX ORDER — MORPHINE SULFATE 2 MG/ML
2 INJECTION, SOLUTION INTRAMUSCULAR; INTRAVENOUS
Status: CANCELLED
Start: 2021-05-18

## 2021-05-18 RX ORDER — DIPHENHYDRAMINE HCL 25 MG
25 CAPSULE ORAL
Status: CANCELLED
Start: 2021-05-18

## 2021-05-18 RX ORDER — ONDANSETRON 8 MG/1
8 TABLET, FILM COATED ORAL
Status: CANCELLED
Start: 2021-05-18

## 2021-05-18 RX ORDER — MORPHINE SULFATE 2 MG/ML
2 INJECTION, SOLUTION INTRAMUSCULAR; INTRAVENOUS
Status: COMPLETED | OUTPATIENT
Start: 2021-05-18 | End: 2021-05-18

## 2021-05-18 RX ORDER — HEPARIN SODIUM (PORCINE) LOCK FLUSH IV SOLN 100 UNIT/ML 100 UNIT/ML
5 SOLUTION INTRAVENOUS
Status: CANCELLED | OUTPATIENT
Start: 2021-05-18

## 2021-05-18 RX ADMIN — MORPHINE SULFATE 2 MG: 2 INJECTION, SOLUTION INTRAMUSCULAR; INTRAVENOUS at 14:29

## 2021-05-18 RX ADMIN — DEXTROSE AND SODIUM CHLORIDE 500 ML: 5; 450 INJECTION, SOLUTION INTRAVENOUS at 14:25

## 2021-05-18 RX ADMIN — MORPHINE SULFATE 2 MG: 2 INJECTION, SOLUTION INTRAMUSCULAR; INTRAVENOUS at 16:15

## 2021-05-18 RX ADMIN — MORPHINE SULFATE 2 MG: 2 INJECTION, SOLUTION INTRAMUSCULAR; INTRAVENOUS at 15:20

## 2021-05-18 ASSESSMENT — PAIN SCALES - GENERAL: PAINLEVEL: EXTREME PAIN (9)

## 2021-05-18 NOTE — PROGRESS NOTES
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      She was admitted 2/1/21-2/3/21 with a new PE, started on Rivaroxaban. Switched to Eliquis 3/25/21 with RUE DVT.     She was admitted 4/26/21-5/11/21 with sickle cell pain crisis complicated by worsening PE in setting of low Apixaban levels, acute hypoxic respiratory failure, pneumonia, acute chest syndrome s/p exchange transfusion on 4/30 and 5/4. After 2nd exchange her oxygen requirement dramatically improved from 20L to 1-2L 5/5 and she was off oxygen as of 5/6.      She was seen today for hematology follow-up.     Interval History:  Jennifer was seen today for follow-up. She overall is feeling well. She has had pain in her low back and has been trying to manage at home with her pain meds and hot baths. She is happy she has been able to stay out of the ED. She feels her pain meds are overall working well (OxyContin BID, Oxycodone 4-6x per day, Celebrex, Tylenol, rare Ibuprofen). She didn't find Robaxin helpful. Voltaren gel as helped. She denies any other areas of pain outside her back.    She is staying at the hotel which has helped her mood, though she admits she still has episodes of feeling very anxious. She is looking for her own place to live.     She denies fevers, headaches, dizziness, chest pain, SOB, cough, nausea, vomiting, abdominal pain, bowel or bladder concerns, edema. She is taking her Xarelto. She is interested in COVID vaccine. She did the desferral at home last  weekend.     Current Outpatient Medications   Medication Sig Dispense Refill     diclofenac (VOLTAREN) 1 % topical gel Apply 4 g topically 4 times daily as needed for moderate pain Apply to back, legs, and arms for pain 150 g 3     diphenhydrAMINE (BENADRYL) 25 MG capsule Take 1-2 capsules (25-50 mg) by mouth nightly as needed for sleep 60 capsule 3     Hydroxyurea 1000 MG TABS Take 2,000 mg by mouth daily 60 tablet 3     hydrOXYzine (ATARAX) 25 MG tablet Take 1 tablet (25 mg) by mouth 3 times daily as needed for anxiety 30 tablet 1     lidocaine-prilocaine (EMLA) 2.5-2.5 % external cream Apply topically as needed for moderate pain 30 g 1     oxyCODONE (OXYCONTIN) 10 MG 12 hr tablet Take 1 tablet (10 mg) by mouth every 12 hours 60 tablet 0     oxyCODONE IR (ROXICODONE) 15 MG tablet Take 1 tablet (15mg) by mouth every 4-6 hours as needed for severe pain. Goal 4 per day. Max 6 per day. 60 tablet 0     acetaminophen (TYLENOL) 325 MG tablet Take 2 tablets (650 mg) by mouth every 6 hours as needed for mild pain 120 tablet 3     albuterol (PROAIR HFA/PROVENTIL HFA/VENTOLIN HFA) 108 (90 Base) MCG/ACT inhaler Inhale 2 puffs into the lungs every 6 hours as needed for shortness of breath / dyspnea or wheezing 8.5 g 3     albuterol (PROVENTIL) (2.5 MG/3ML) 0.083% neb solution Take 1 vial (2.5 mg) by nebulization every 6 hours as needed for shortness of breath / dyspnea or wheezing 12 mL 4     ARIPiprazole (ABILIFY) 2 MG tablet Take 1 tablet (2 mg) by mouth daily 30 tablet 3     budesonide-formoterol (SYMBICORT) 160-4.5 MCG/ACT Inhaler Inhale 1 puff into the lungs every evening 10.2 g 3     celecoxib (CELEBREX) 100 MG capsule Take 1 capsule (100 mg) by mouth 2 times daily 60 capsule 3     EPINEPHrine (ANY BX GENERIC EQUIV) 0.3 MG/0.3ML injection 2-pack Inject 0.3 mLs (0.3 mg) into the muscle as needed for anaphylaxis 1 each 1     JADENU 360 MG tablet Take 4 tablets (1,440 mg) by mouth every evening 30 tablet 0      medroxyPROGESTERone (DEPO-PROVERA) 150 MG/ML IM injection Inject 150 mg into the muscle       naloxone (NARCAN) 4 MG/0.1ML nasal spray Spray 1 spray (4 mg) into one nostril alternating nostrils once as needed for opioid reversal every 2-3 minutes until assistance arrives 0.2 mL 1     ondansetron (ZOFRAN) 8 MG tablet        [START ON 5/29/2021] rivaroxaban ANTICOAGULANT (XARELTO ANTICOAGULANT) 20 MG TABS tablet Take 1 tablet (20 mg) by mouth daily (with dinner) Starting on 5/29 after completing your 15mg twice daily dosing on 5/28 30 tablet 2     rivaroxaban ANTICOAGULANT (XARELTO) 15 MG TABS tablet Take 1 tablet (15 mg) by mouth 2 times daily (with meals) for 17 days 34 tablet 0     sertraline (ZOLOFT) 100 MG tablet Take 2 tablets (200 mg) by mouth daily 180 tablet 3     Physical Examination:  /74 HR 89 Temp 98.6 RR 16 Weight 163lb Pain 9/10 SpO2 100%  Wt Readings from Last 10 Encounters:   05/10/21 72.2 kg (159 lb 2.8 oz)   04/26/21 75.8 kg (167 lb 1.6 oz)   04/25/21 77.6 kg (171 lb)   04/24/21 77.9 kg (171 lb 11.2 oz)   04/22/21 76.2 kg (168 lb)   04/21/21 76.3 kg (168 lb 3.4 oz)   04/16/21 76.3 kg (168 lb 3.4 oz)   04/16/21 76.3 kg (168 lb 3.2 oz)   04/12/21 75.2 kg (165 lb 11.2 oz)   04/07/21 73.9 kg (163 lb)     Constitutional: Well-appearing female in no acute distress.  Eyes: EOMI, PERRL. No scleral icterus.  ENT: Oral mucosa is moist without lesions or thrush.   Cardiovascular: Regular rate and rhythm. No murmurs, gallops, or rubs. No peripheral edema.  Respiratory: Clear to auscultation bilaterally. No wheezes or crackles.  Gastrointestinal: Bowel sounds present. Abdomen soft, non-tender.   Neurologic: Cranial nerves II through XII are grossly intact.  Skin: No rashes, petechiae, or bruising noted on exposed skin.    Laboratory Data:  Results for HIMANSHU AL (MRN [id number]) as of 5/19/2021 20:07   5/19/2021 11:49   Sodium 136   Potassium 3.6   Chloride 109   Carbon Dioxide 24   Urea Nitrogen 6 (L)    Creatinine 0.49 (L)   GFR Estimate >90   GFR Estimate If Black >90   Calcium 8.7   Anion Gap 4   Albumin 3.5   Protein Total 8.2   Bilirubin Total 1.0   Alkaline Phosphatase 94    (H)    (H)   Glucose 95   WBC 7.3   Hemoglobin 9.1 (L)   Hematocrit 27.6 (L)   Platelet Count 575 (H)   RBC Count 3.09 (L)   MCV 89   MCH 29.4   MCHC 33.0   RDW 19.9 (H)   Diff Method Automated Method   % Neutrophils 62.1   % Lymphocytes 25.0   % Monocytes 5.3   % Eosinophils 4.8   % Basophils 2.5   % Immature Granulocytes 0.3   Nucleated RBCs 1 (H)   Absolute Neutrophil 4.5   Absolute Lymphocytes 1.8   Absolute Monocytes 0.4   Absolute Eosinophils 0.4   Absolute Basophils 0.2   Abs Immature Granulocytes 0.0   Absolute Nucleated RBC 0.1       Assessment and Plan:  1. Sickle Cell Disease  HgbSS, recently complicated by prolonged hospitalization for complicated pain crisis with worsening PE, acute chest, possible pneumonia, acute hypoxic respiratory failure. Did require exchange x 2 inpatient. Now doing well overall, in infusion today for mild crisis.      Labs: Hgb improved to 9.1. Slight thrombocytosis. ALT/AST elevated similar to prior, suspect 2/2 iron deposition. Confirmed <3000mg Tylenol daily. Monitor. Otherwise labs stable.      Meds: Stopped Voxeletor. Continue Hydrea 2000mg daily. Contineu Jadenu 4 tablets daily. Now on Desferral over 48 hours every other weekend-will confirm this with care coordinator.      Pain Control: Continue OxyContin 10mg BID. Continue Oxycodone to 15mg PRN and this is working well. Continue Tylenol PRN, and Celebrex PRN. OK to use Voltaren gel as needed.    Did prescribe EMLA cream for port.      Follow-up: Will see her in one week in person at her request. Has follow up with Dr. Duncan 6/4.     2. Neuro  History of stroke, no new concerns. ASA on hold while on anticoagulation for PE.      PT referral for right leg weakness-not discussed today, referral was placed last week.      3.  Psych  History of anxiety and depression. Continue Sertraline 200mg daily and Abilify 2mg daily. Depression improved and mood is bright today. Still has episodes of high anxiety. To minimize drug interactions would prefer not to use benzos for panic symptoms. As such will prescribe Hydroxyzine PRN.     Follows with outside therapist every week.     OK to use Benadryl PRN insomnia.      4. Pulm  History of asthma, continue Symbicort and Albuterol PRN.      Bilateral PE diagnosed 2/1/21 was on Xarelto. RUE DVT 3/25/21 switched to Eliquis. Worsening PE diagnosed 4/27/21, heme consult inpatient, Eliquis levels were low, switched back to Xarelto and levels were WNL. Now on 15mg BID until 5/29, then switch to 20mg daily. No recurrent signs of VTE.    Acute chest/hypoxic respiratory failure/pneumonia: Issues inpatient, received abx and exchange x 2 with improvement in O2. Discharged without oxygen. Denies any breathing concerns or fevers. Sating 100% on RA.    5. Other  Will talk to Dr. Duncan about COVID vaccine, likely recommend Pfzier or moderna to minimize clotting risk.    Did not discuss sickle cell health maintenance today.     30 minutes spent on the date of the encounter doing chart review, review of test results, interpretation of tests, patient visit and documentation     Andrei Machado PA-C  [facility]  [address] [place], [place] [id number] [phone]

## 2021-05-18 NOTE — TELEPHONE ENCOUNTER
[facility] Telephone Triage Note     The following symptoms were reported:     Sickle cell pain: back pain      Accompanying Signs & Symptoms:    Chest Pain: no  Shortness of Breath: no   Fever: no  Chills: no  Other: no     Therapies Tried and outcome: has not been able to  oxycontin due to insurance restrictions. Took oxycodone about an hour ago with no relief     Improved by: requesting for IVF + IV pain meds     The following provider was consulted:    Encounter routed to Dr. Duncan and Julieta Payne, [facility] for awareness. Pt fits protocol for IVF/Pain meds this afternoon.      Patient instructions and/or follow up:      Scheduled for 2pm for IVF/Pain meds

## 2021-05-18 NOTE — LETTER
5/18/2021         RE: Jennifer Cervantes  [address]  [facility] [address]        Dear Colleague,    Thank you for referring your patient, Jennifer Cervantes, to the [facility]. Please see a copy of my visit note below.    Infusion Nursing Note:  Jennifer Cervantes presents today for add on infusion.    Patient seen by provider today: No   present during visit today: Not Applicable.    Note: Add- on infusion for worsening low back pain. Pain rated 9/10 starting last night. Taking oxycodone 15mg PO at home with minimal improvement.    Received 500mL IVF over 1 hour, Morphine 2mg IVP x3 doses as ordered. Patient reports improvement in low back pain following interventions. Rating pain 6/10.     Intravenous Access:  Implanted Port.    Treatment Conditions:  Not Applicable.      Post Infusion Assessment:  Patient tolerated infusion without incident.       Discharge Plan:   Patient discharged in stable condition accompanied by: self.  Departure Mode: Ambulatory.    Jennifer Lai RN                            Again, thank you for allowing me to participate in the care of your patient.        Sincerely,        [facility]

## 2021-05-18 NOTE — PROGRESS NOTES
This is a recent snapshot of the patient's [facility] medical record.  For current drug dose and complete information and questions, call [phone] or In Basket pool, fv home infusion ([id number])  CSN Number:  [id number]

## 2021-05-18 NOTE — PROGRESS NOTES
Social Work Follow-Up  [facility]    Data/Intervention:  Patient Name:  Jennifer Cervantes  /Age:  2000 (21 year old)    Reason for Follow-Up:  Transportation    Collaborated With:    -JOE Roach  -Pt  -Taxi Voucher     Intervention/Education/Resources Provided:  SW received a phone call from JOE Roach asking to assist with transportation for pt. Pt is scheduled for a 2 pm appointment today. SW called pt to verify pt's address. Pt is currently staying at [address] in [place], [facility] ([address] [facility] SW used a taxi voucher to arrange ride and informed pt of their ride details.     Assessment/Plan:  SW will remain available as needed.  Previously provided patient/family with writer's contact information and availability.       CARLOS Chavez,[facility]  Hematology/Oncology Social Worker  Phone:[phone] Pager: [phone]

## 2021-05-18 NOTE — PROGRESS NOTES
Infusion Nursing Note:  Jennifer Cervantes presents today for add on infusion.    Patient seen by provider today: No   present during visit today: Not Applicable.    Note: Add- on infusion for worsening low back pain. Pain rated 9/10 starting last night. Taking oxycodone 15mg PO at home with minimal improvement.    Received 500mL IVF over 1 hour, Morphine 2mg IVP x3 doses as ordered. Patient reports improvement in low back pain following interventions. Rating pain 6/10.     Intravenous Access:  Implanted Port.    Treatment Conditions:  Not Applicable.      Post Infusion Assessment:  Patient tolerated infusion without incident.       Discharge Plan:   Patient discharged in stable condition accompanied by: self.  Departure Mode: Ambulatory.    Jennifer Lai RN

## 2021-05-19 ENCOUNTER — INFUSION THERAPY VISIT (OUTPATIENT)
Dept: ONCOLOGY | Facility: CLINIC | Age: 21
End: 2021-05-19
Attending: PHYSICIAN ASSISTANT
Payer: COMMERCIAL

## 2021-05-19 ENCOUNTER — APPOINTMENT (OUTPATIENT)
Dept: LAB | Facility: CLINIC | Age: 21
End: 2021-05-19
Attending: PHYSICIAN ASSISTANT
Payer: COMMERCIAL

## 2021-05-19 ENCOUNTER — TELEPHONE (OUTPATIENT)
Dept: ONCOLOGY | Facility: CLINIC | Age: 21
End: 2021-05-19

## 2021-05-19 VITALS
RESPIRATION RATE: 16 BRPM | BODY MASS INDEX: 28.05 KG/M2 | DIASTOLIC BLOOD PRESSURE: 74 MMHG | HEART RATE: 89 BPM | OXYGEN SATURATION: 100 % | SYSTOLIC BLOOD PRESSURE: 122 MMHG | WEIGHT: 163.4 LBS | TEMPERATURE: 98.6 F

## 2021-05-19 DIAGNOSIS — G89.4 CHRONIC PAIN SYNDROME: ICD-10-CM

## 2021-05-19 DIAGNOSIS — D57.1 HB-SS DISEASE WITHOUT CRISIS (H): Primary | ICD-10-CM

## 2021-05-19 DIAGNOSIS — D57.00 SICKLE CELL PAIN CRISIS (H): ICD-10-CM

## 2021-05-19 DIAGNOSIS — Z97.5 PRESENCE OF INTRAUTERINE CONTRACEPTIVE DEVICE: ICD-10-CM

## 2021-05-19 DIAGNOSIS — D57.00 HB-SS DISEASE WITH CRISIS (H): ICD-10-CM

## 2021-05-19 DIAGNOSIS — F41.9 ANXIETY: ICD-10-CM

## 2021-05-19 DIAGNOSIS — G47.00 INSOMNIA, UNSPECIFIED TYPE: ICD-10-CM

## 2021-05-19 LAB
ALBUMIN SERPL-MCNC: 3.5 G/DL (ref 3.4–5)
ALP SERPL-CCNC: 94 U/L (ref 40–150)
ALT SERPL W P-5'-P-CCNC: 109 U/L (ref 0–50)
ANION GAP SERPL CALCULATED.3IONS-SCNC: 4 MMOL/L (ref 3–14)
AST SERPL W P-5'-P-CCNC: 105 U/L (ref 0–45)
BASOPHILS # BLD AUTO: 0.2 10E9/L (ref 0–0.2)
BASOPHILS NFR BLD AUTO: 2.5 %
BILIRUB SERPL-MCNC: 1 MG/DL (ref 0.2–1.3)
BUN SERPL-MCNC: 6 MG/DL (ref 7–30)
CALCIUM SERPL-MCNC: 8.7 MG/DL (ref 8.5–10.1)
CHLORIDE SERPL-SCNC: 109 MMOL/L (ref 94–109)
CO2 SERPL-SCNC: 24 MMOL/L (ref 20–32)
CREAT SERPL-MCNC: 0.49 MG/DL (ref 0.52–1.04)
DIFFERENTIAL METHOD BLD: ABNORMAL
EOSINOPHIL # BLD AUTO: 0.4 10E9/L (ref 0–0.7)
EOSINOPHIL NFR BLD AUTO: 4.8 %
ERYTHROCYTE [DISTWIDTH] IN BLOOD BY AUTOMATED COUNT: 19.9 % (ref 10–15)
GFR SERPL CREATININE-BSD FRML MDRD: >90 ML/MIN/{1.73_M2}
GLUCOSE SERPL-MCNC: 95 MG/DL (ref 70–99)
HCT VFR BLD AUTO: 27.6 % (ref 35–47)
HGB BLD-MCNC: 9.1 G/DL (ref 11.7–15.7)
IMM GRANULOCYTES # BLD: 0 10E9/L (ref 0–0.4)
IMM GRANULOCYTES NFR BLD: 0.3 %
LYMPHOCYTES # BLD AUTO: 1.8 10E9/L (ref 0.8–5.3)
LYMPHOCYTES NFR BLD AUTO: 25 %
MCH RBC QN AUTO: 29.4 PG (ref 26.5–33)
MCHC RBC AUTO-ENTMCNC: 33 G/DL (ref 31.5–36.5)
MCV RBC AUTO: 89 FL (ref 78–100)
MONOCYTES # BLD AUTO: 0.4 10E9/L (ref 0–1.3)
MONOCYTES NFR BLD AUTO: 5.3 %
NEUTROPHILS # BLD AUTO: 4.5 10E9/L (ref 1.6–8.3)
NEUTROPHILS NFR BLD AUTO: 62.1 %
NRBC # BLD AUTO: 0.1 10*3/UL
NRBC BLD AUTO-RTO: 1 /100
PLATELET # BLD AUTO: 575 10E9/L (ref 150–450)
POTASSIUM SERPL-SCNC: 3.6 MMOL/L (ref 3.4–5.3)
PROT SERPL-MCNC: 8.2 G/DL (ref 6.8–8.8)
RBC # BLD AUTO: 3.09 10E12/L (ref 3.8–5.2)
SODIUM SERPL-SCNC: 136 MMOL/L (ref 133–144)
WBC # BLD AUTO: 7.3 10E9/L (ref 4–11)

## 2021-05-19 PROCEDURE — 250N000011 HC RX IP 250 OP 636: Performed by: PHYSICIAN ASSISTANT

## 2021-05-19 PROCEDURE — 80053 COMPREHEN METABOLIC PANEL: CPT | Performed by: PHYSICIAN ASSISTANT

## 2021-05-19 PROCEDURE — 99214 OFFICE O/P EST MOD 30 MIN: CPT | Performed by: PHYSICIAN ASSISTANT

## 2021-05-19 PROCEDURE — 258N000003 HC RX IP 258 OP 636: Performed by: PEDIATRICS

## 2021-05-19 PROCEDURE — 96376 TX/PRO/DX INJ SAME DRUG ADON: CPT

## 2021-05-19 PROCEDURE — 96374 THER/PROPH/DIAG INJ IV PUSH: CPT

## 2021-05-19 PROCEDURE — 250N000011 HC RX IP 250 OP 636: Performed by: PEDIATRICS

## 2021-05-19 PROCEDURE — 96361 HYDRATE IV INFUSION ADD-ON: CPT

## 2021-05-19 PROCEDURE — 85025 COMPLETE CBC W/AUTO DIFF WBC: CPT | Performed by: PHYSICIAN ASSISTANT

## 2021-05-19 RX ORDER — HEPARIN SODIUM (PORCINE) LOCK FLUSH IV SOLN 100 UNIT/ML 100 UNIT/ML
5 SOLUTION INTRAVENOUS
Status: DISCONTINUED | OUTPATIENT
Start: 2021-05-19 | End: 2021-05-19 | Stop reason: HOSPADM

## 2021-05-19 RX ORDER — HEPARIN SODIUM,PORCINE 10 UNIT/ML
5 VIAL (ML) INTRAVENOUS
Status: CANCELLED | OUTPATIENT
Start: 2021-05-19

## 2021-05-19 RX ORDER — DIPHENHYDRAMINE HCL 25 MG
25 CAPSULE ORAL
Status: CANCELLED
Start: 2021-05-19

## 2021-05-19 RX ORDER — MORPHINE SULFATE 2 MG/ML
2 INJECTION, SOLUTION INTRAMUSCULAR; INTRAVENOUS
Status: DISCONTINUED | OUTPATIENT
Start: 2021-05-19 | End: 2021-05-19 | Stop reason: HOSPADM

## 2021-05-19 RX ORDER — LIDOCAINE/PRILOCAINE 2.5 %-2.5%
CREAM (GRAM) TOPICAL PRN
Qty: 30 G | Refills: 1 | Status: SHIPPED | OUTPATIENT
Start: 2021-05-19 | End: 2021-09-20

## 2021-05-19 RX ORDER — OXYCODONE HYDROCHLORIDE 15 MG/1
TABLET ORAL
Qty: 60 TABLET | Refills: 0 | Status: SHIPPED | OUTPATIENT
Start: 2021-05-19 | End: 2021-06-01

## 2021-05-19 RX ORDER — DIPHENHYDRAMINE HCL 25 MG
25-50 CAPSULE ORAL
Qty: 60 CAPSULE | Refills: 3 | Status: SHIPPED | OUTPATIENT
Start: 2021-05-19 | End: 2021-09-20

## 2021-05-19 RX ORDER — MORPHINE SULFATE 2 MG/ML
2 INJECTION, SOLUTION INTRAMUSCULAR; INTRAVENOUS
Status: CANCELLED
Start: 2021-05-19

## 2021-05-19 RX ORDER — ONDANSETRON 8 MG/1
8 TABLET, FILM COATED ORAL
Status: CANCELLED
Start: 2021-05-19

## 2021-05-19 RX ORDER — HEPARIN SODIUM (PORCINE) LOCK FLUSH IV SOLN 100 UNIT/ML 100 UNIT/ML
5 SOLUTION INTRAVENOUS ONCE
Status: COMPLETED | OUTPATIENT
Start: 2021-05-19 | End: 2021-05-19

## 2021-05-19 RX ORDER — HYDROXYZINE HYDROCHLORIDE 25 MG/1
25 TABLET, FILM COATED ORAL 3 TIMES DAILY PRN
Qty: 30 TABLET | Refills: 1 | Status: SHIPPED | OUTPATIENT
Start: 2021-05-19 | End: 2021-12-20

## 2021-05-19 RX ORDER — OXYCODONE HCL 10 MG/1
10 TABLET, FILM COATED, EXTENDED RELEASE ORAL EVERY 12 HOURS
Qty: 60 TABLET | Refills: 0 | Status: SHIPPED | OUTPATIENT
Start: 2021-05-19 | End: 2021-06-14

## 2021-05-19 RX ORDER — HEPARIN SODIUM (PORCINE) LOCK FLUSH IV SOLN 100 UNIT/ML 100 UNIT/ML
5 SOLUTION INTRAVENOUS
Status: CANCELLED | OUTPATIENT
Start: 2021-05-19

## 2021-05-19 RX ADMIN — MORPHINE SULFATE 2 MG: 2 INJECTION, SOLUTION INTRAMUSCULAR; INTRAVENOUS at 13:00

## 2021-05-19 RX ADMIN — MORPHINE SULFATE 2 MG: 2 INJECTION, SOLUTION INTRAMUSCULAR; INTRAVENOUS at 14:00

## 2021-05-19 RX ADMIN — MORPHINE SULFATE 2 MG: 2 INJECTION, SOLUTION INTRAMUSCULAR; INTRAVENOUS at 11:59

## 2021-05-19 RX ADMIN — Medication 5 ML: at 11:40

## 2021-05-19 RX ADMIN — Medication 5 ML: at 14:20

## 2021-05-19 RX ADMIN — DEXTROSE AND SODIUM CHLORIDE 500 ML: 5; 450 INJECTION, SOLUTION INTRAVENOUS at 11:56

## 2021-05-19 ASSESSMENT — PAIN SCALES - GENERAL: PAINLEVEL: EXTREME PAIN (9)

## 2021-05-19 NOTE — PATIENT INSTRUCTIONS
[facility] Triage and after hours / weekends / holidays:  [phone]    Please call the triage or after hours line if you experience a temperature greater than or equal to 100.5, shaking chills, have uncontrolled nausea, vomiting and/or diarrhea, dizziness, shortness of breath, chest pain, bleeding, unexplained bruising, or if you have any other new/concerning symptoms, questions or concerns.      If you are having any concerning symptoms or wish to speak to a provider before your next infusion visit, please call your care coordinator or triage to notify them so we can adequately serve you.     If you need a refill on a narcotic prescription or other medication, please call before your infusion appointment.           May 2021      Brett Monday Tuesday Wednesday Thursday Friday Saturday                                 1       2    XR CHEST PORT 1 VIEW  11:40 AM   (20 min.)   [id number]   [facility]    ECHO COMPLETE  12:00 PM   (60 min.)   [id number]   [facility] 3    CT CHEST WO  10:05 AM   (20 min.)   [id number]   [facility] 4    RED BLOOD CELL EXCHANGE   4:00 PM   (200 min.)   Rn1, Uu Apheresis   [facility] 5    XR THORACIC SPINE 2 VIEWS  12:30 PM   (20 min.)   UUXR1   [facility] 6    IR CVC NON TUNNEL LINE REMOVAL  10:00 AM   (90 min.)   [id number]   [facility] 7    IP EVALUATION   6:00 AM   (60 min.)   Talita Velazquez, PT   [facility]    XR CHEST PORT 1 VIEW   8:40 AM   (20 min.)   [id number]   [facility] 8       9     10    IP TREATMENT   6:00 AM   (30 min.)   Brynn Stack, PT   [facility]    US EXTREMITY NON VASCULAR LEFT   3:20 PM   (20 min.)   UUUS3   [facility] 11     12    VIDEO VISIT RETURN   4:55 PM   (50 min.)   Jeannette  RONALDO Forbes   [facility] 13     14     15       16     17     18    BMT INFUSION 2 HR (120 MIN)   2:00 PM   (120 min.)    BMT INFUSION NURSE   [facility] [place] 19    ONC INFUSION 2 HR (120 MIN)  11:30 AM   (120 min.)    ONC INFUSION NURSE   [facility]    LAB CENTRAL  11:30 AM   (15 min.)   [facility] LAB DRAW   [facility]    RETURN  11:45 AM   (50 min.)   Andrei Machado PA   [facility] 20     21     22       23     24     25     26    P RETURN   1:15 PM   (30 min.)   Suraj Case MD   [facility] [place] 27     28     29       30     31 June 2021 Sunday Monday Tuesday Wednesday Thursday Friday Saturday             1    NEURO EVAL   7:15 AM   (60 min.)   Deborah Clark PT   [facility] 2     3     4    LAB CENTRAL   1:00 PM   (15 min.)   [facility] DRAW   [facility]    RETURN   1:15 PM   (30 min.)   Eric Duncan MD   [facility] 5       6     7     8     9     10     11    [facility] FULL PULMONARY FUNCTION  12:45 PM   (60 min.)    PFL A   [facility] NEW   2:05 PM   (80 min.)   Rosalva Gasca MD   [facility] 12       13     14     15     16     17     18     19       20     21     22     23     24     25     26       27     28     29     30                                    Lab Results:  Recent Results (from the past 12 hour(s))   Comprehensive metabolic panel    Collection Time: 05/19/21 11:49 AM   Result Value Ref Range    Sodium 136 133 - 144 mmol/L    Potassium 3.6 3.4 - 5.3 mmol/L    Chloride 109 94 - 109 mmol/L    Carbon  Dioxide 24 20 - 32 mmol/L    Anion Gap 4 3 - 14 mmol/L    Glucose 95 70 - 99 mg/dL    Urea Nitrogen 6 (L) 7 - 30 mg/dL    Creatinine 0.49 (L) 0.52 - 1.04 mg/dL    GFR Estimate >90 >60 mL/min/[1.73_m2]    GFR Estimate If Black >90 >60 mL/min/[1.73_m2]    Calcium 8.7 8.5 - 10.1 mg/dL    Bilirubin Total 1.0 0.2 - 1.3 mg/dL    Albumin 3.5 3.4 - 5.0 g/dL    Protein Total 8.2 6.8 - 8.8 g/dL    Alkaline Phosphatase 94 40 - 150 U/L     (H) 0 - 50 U/L     (H) 0 - 45 U/L   *CBC with platelets differential    Collection Time: 05/19/21 11:49 AM   Result Value Ref Range    WBC 7.3 4.0 - 11.0 10e9/L    RBC Count 3.09 (L) 3.8 - 5.2 10e12/L    Hemoglobin 9.1 (L) 11.7 - 15.7 g/dL    Hematocrit 27.6 (L) 35.0 - 47.0 %    MCV 89 78 - 100 fl    MCH 29.4 26.5 - 33.0 pg    MCHC 33.0 31.5 - 36.5 g/dL    RDW 19.9 (H) 10.0 - 15.0 %    Platelet Count 575 (H) 150 - 450 10e9/L    Diff Method Automated Method     % Neutrophils 62.1 %    % Lymphocytes 25.0 %    % Monocytes 5.3 %    % Eosinophils 4.8 %    % Basophils 2.5 %    % Immature Granulocytes 0.3 %    Nucleated RBCs 1 (H) 0 /100    Absolute Neutrophil 4.5 1.6 - 8.3 10e9/L    Absolute Lymphocytes 1.8 0.8 - 5.3 10e9/L    Absolute Monocytes 0.4 0.0 - 1.3 10e9/L    Absolute Eosinophils 0.4 0.0 - 0.7 10e9/L    Absolute Basophils 0.2 0.0 - 0.2 10e9/L    Abs Immature Granulocytes 0.0 0 - 0.4 10e9/L    Absolute Nucleated RBC 0.1

## 2021-05-19 NOTE — NURSING NOTE
Chief Complaint   Patient presents with     Port Draw     Labs drawn via port by RN in lab. VS taken.      Port accessed with 20 gauge 3/4 inch gripper needle and labs drawn by rn.  Port flushed with NS and heparin.  Pt tolerated well.  VS taken. Pt already checked in for infusion appt.     Melissa Wakefield RN

## 2021-05-19 NOTE — PROGRESS NOTES
Infusion Nursing Note:  Jennifer Cervantes presents today for IVF and pain medications.    Patient seen by provider today: Yes: RONALDO Ann   present during visit today: Not Applicable.    Note: Pt assessed upon arrival to infusion suite. Rating lower back pain 9/10 on arrival. After 3 doses IV morphine, pain decreased to 5/10 which patient stated was a tolerable level. Pt felt comfortable discharging home.     Intravenous Access:  Implanted Port.    Treatment Conditions:  Lab Results   Component Value Date    HGB 9.1 05/19/2021     Lab Results   Component Value Date    WBC 7.3 05/19/2021      Lab Results   Component Value Date    ANEU 4.5 05/19/2021     Lab Results   Component Value Date     05/19/2021      Lab Results   Component Value Date     05/19/2021                   Lab Results   Component Value Date    POTASSIUM 3.6 05/19/2021           Lab Results   Component Value Date    MAG 1.7 02/21/2021            Lab Results   Component Value Date    CR 0.49 05/19/2021                   Lab Results   Component Value Date    MICAH 8.7 05/19/2021                Lab Results   Component Value Date    BILITOTAL 1.0 05/19/2021           Lab Results   Component Value Date    ALBUMIN 3.5 05/19/2021                    Lab Results   Component Value Date     05/19/2021           Lab Results   Component Value Date     05/19/2021     Post Infusion Assessment:  Patient tolerated infusion without incident.  Blood return noted pre and post infusion.  Site patent and intact, free from redness, edema or discomfort.  No evidence of extravasations.  Access discontinued per protocol.     Discharge Plan:   Prescription refills given for Oxycodone, OxyContin, Hydroxyurea, Benadryl, Voltaren, Atarax, EMLA cream.  Copy of AVS reviewed with patient and/or family.  Patient will return 6/4/21 for next MD appointment and call for further infusion appointment needs.   Patient discharged in stable  condition accompanied by: self.  Departure Mode: Ambulatory.

## 2021-05-19 NOTE — TELEPHONE ENCOUNTER
[facility] Triage Note    Patient called triage requesting IVF pain meds for back pain rated 9/10 x a few hours today.     Stated last took prn pain medication Oxycodone two at 6:45 am  this morning without relief.     Denied fevers, chills, cough, sob, chest pain or other symptoms.     Pt's last infusion 5/19/21   Last clinic visit 4/26/21 with JOSIANE Machado  Follow-up clinic visit today.     Pt  needing refills today of Oxycodone and OxyContin.  She is not sure how many pills and will know this when she comes for visit today.        Pt qualifies for sickle cell standing order protocol.    Estimated time for travel to appt 10-15 min    Ride assistance not needed    She has appt with JOSIANE Machado at 11 am today.  Page to OJSIANE Machado at 7:49 am - approved for infusion today and she will see patient in infusion if needed.     Infusion will need labs prior as previously scheduled today;  Called Jennifer - she will arrive for labs at 10:30 am, visit with ANDREAS at 11 and is scheduled for infusion at 11:30 am.  She is aware that she may just go to infusion if needed for timing.       Kelsey Santiago RN   BSN, HNBC, STAR-T  [facility] Triage

## 2021-05-19 NOTE — LETTER
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      She was admitted 2/1/21-2/3/21 with a new PE, started on Rivaroxaban. Switched to Eliquis 3/25/21 with RUE DVT.     She was admitted 4/26/21-5/11/21 with sickle cell pain crisis complicated by worsening PE in setting of low Apixaban levels, acute hypoxic respiratory failure, pneumonia, acute chest syndrome s/p exchange transfusion on 4/30 and 5/4. After 2nd exchange her oxygen requirement dramatically improved from 20L to 1-2L 5/5 and she was off oxygen as of 5/6.      She was seen today for hematology follow-up.     Interval History:  Jennifer was seen today for follow-up. She overall is feeling well. She has had pain in her low back and has been trying to manage at home with her pain meds and hot baths. She is happy she has been able to stay out of the ED. She feels her pain meds are overall working well (OxyContin BID, Oxycodone 4-6x per day, Celebrex, Tylenol, rare Ibuprofen). She didn't find Robaxin helpful. Voltaren gel as helped. She denies any other areas of pain outside her back.    She is staying at the hotel which has helped her mood, though she admits she still has episodes of feeling very anxious. She is looking for her own place to live.     She denies fevers, headaches, dizziness, chest pain, SOB, cough, nausea, vomiting, abdominal pain, bowel or bladder concerns, edema. She is taking  her Xarelto. She is interested in COVID vaccine. She did the desferral at home last weekend.     Current Outpatient Medications   Medication Sig Dispense Refill     diclofenac (VOLTAREN) 1 % topical gel Apply 4 g topically 4 times daily as needed for moderate pain Apply to back, legs, and arms for pain 150 g 3     diphenhydrAMINE (BENADRYL) 25 MG capsule Take 1-2 capsules (25-50 mg) by mouth nightly as needed for sleep 60 capsule 3     Hydroxyurea 1000 MG TABS Take 2,000 mg by mouth daily 60 tablet 3     hydrOXYzine (ATARAX) 25 MG tablet Take 1 tablet (25 mg) by mouth 3 times daily as needed for anxiety 30 tablet 1     lidocaine-prilocaine (EMLA) 2.5-2.5 % external cream Apply topically as needed for moderate pain 30 g 1     oxyCODONE (OXYCONTIN) 10 MG 12 hr tablet Take 1 tablet (10 mg) by mouth every 12 hours 60 tablet 0     oxyCODONE IR (ROXICODONE) 15 MG tablet Take 1 tablet (15mg) by mouth every 4-6 hours as needed for severe pain. Goal 4 per day. Max 6 per day. 60 tablet 0     acetaminophen (TYLENOL) 325 MG tablet Take 2 tablets (650 mg) by mouth every 6 hours as needed for mild pain 120 tablet 3     albuterol (PROAIR HFA/PROVENTIL HFA/VENTOLIN HFA) 108 (90 Base) MCG/ACT inhaler Inhale 2 puffs into the lungs every 6 hours as needed for shortness of breath / dyspnea or wheezing 8.5 g 3     albuterol (PROVENTIL) (2.5 MG/3ML) 0.083% neb solution Take 1 vial (2.5 mg) by nebulization every 6 hours as needed for shortness of breath / dyspnea or wheezing 12 mL 4     ARIPiprazole (ABILIFY) 2 MG tablet Take 1 tablet (2 mg) by mouth daily 30 tablet 3     budesonide-formoterol (SYMBICORT) 160-4.5 MCG/ACT Inhaler Inhale 1 puff into the lungs every evening 10.2 g 3     celecoxib (CELEBREX) 100 MG capsule Take 1 capsule (100 mg) by mouth 2 times daily 60 capsule 3     EPINEPHrine (ANY BX GENERIC EQUIV) 0.3 MG/0.3ML injection 2-pack Inject 0.3 mLs (0.3 mg) into the muscle as needed for anaphylaxis 1 each 1     JADENU 360 MG  tablet Take 4 tablets (1,440 mg) by mouth every evening 30 tablet 0     medroxyPROGESTERone (DEPO-PROVERA) 150 MG/ML IM injection Inject 150 mg into the muscle       naloxone (NARCAN) 4 MG/0.1ML nasal spray Spray 1 spray (4 mg) into one nostril alternating nostrils once as needed for opioid reversal every 2-3 minutes until assistance arrives 0.2 mL 1     ondansetron (ZOFRAN) 8 MG tablet        [START ON 5/29/2021] rivaroxaban ANTICOAGULANT (XARELTO ANTICOAGULANT) 20 MG TABS tablet Take 1 tablet (20 mg) by mouth daily (with dinner) Starting on 5/29 after completing your 15mg twice daily dosing on 5/28 30 tablet 2     rivaroxaban ANTICOAGULANT (XARELTO) 15 MG TABS tablet Take 1 tablet (15 mg) by mouth 2 times daily (with meals) for 17 days 34 tablet 0     sertraline (ZOLOFT) 100 MG tablet Take 2 tablets (200 mg) by mouth daily 180 tablet 3     Physical Examination:  /74 HR 89 Temp 98.6 RR 16 Weight 163lb Pain 9/10 SpO2 100%  Wt Readings from Last 10 Encounters:   05/10/21 72.2 kg (159 lb 2.8 oz)   04/26/21 75.8 kg (167 lb 1.6 oz)   04/25/21 77.6 kg (171 lb)   04/24/21 77.9 kg (171 lb 11.2 oz)   04/22/21 76.2 kg (168 lb)   04/21/21 76.3 kg (168 lb 3.4 oz)   04/16/21 76.3 kg (168 lb 3.4 oz)   04/16/21 76.3 kg (168 lb 3.2 oz)   04/12/21 75.2 kg (165 lb 11.2 oz)   04/07/21 73.9 kg (163 lb)     Constitutional: Well-appearing female in no acute distress.  Eyes: EOMI, PERRL. No scleral icterus.  ENT: Oral mucosa is moist without lesions or thrush.   Cardiovascular: Regular rate and rhythm. No murmurs, gallops, or rubs. No peripheral edema.  Respiratory: Clear to auscultation bilaterally. No wheezes or crackles.  Gastrointestinal: Bowel sounds present. Abdomen soft, non-tender.   Neurologic: Cranial nerves II through XII are grossly intact.  Skin: No rashes, petechiae, or bruising noted on exposed skin.    Laboratory Data:  Results for HIMANSHU AL (MRN [id number]) as of 5/19/2021 20:07   5/19/2021 11:49   Sodium 136    Potassium 3.6   Chloride 109   Carbon Dioxide 24   Urea Nitrogen 6 (L)   Creatinine 0.49 (L)   GFR Estimate >90   GFR Estimate If Black >90   Calcium 8.7   Anion Gap 4   Albumin 3.5   Protein Total 8.2   Bilirubin Total 1.0   Alkaline Phosphatase 94    (H)    (H)   Glucose 95   WBC 7.3   Hemoglobin 9.1 (L)   Hematocrit 27.6 (L)   Platelet Count 575 (H)   RBC Count 3.09 (L)   MCV 89   MCH 29.4   MCHC 33.0   RDW 19.9 (H)   Diff Method Automated Method   % Neutrophils 62.1   % Lymphocytes 25.0   % Monocytes 5.3   % Eosinophils 4.8   % Basophils 2.5   % Immature Granulocytes 0.3   Nucleated RBCs 1 (H)   Absolute Neutrophil 4.5   Absolute Lymphocytes 1.8   Absolute Monocytes 0.4   Absolute Eosinophils 0.4   Absolute Basophils 0.2   Abs Immature Granulocytes 0.0   Absolute Nucleated RBC 0.1       Assessment and Plan:  1. Sickle Cell Disease  HgbSS, recently complicated by prolonged hospitalization for complicated pain crisis with worsening PE, acute chest, possible pneumonia, acute hypoxic respiratory failure. Did require exchange x 2 inpatient. Now doing well overall, in infusion today for mild crisis.      Labs: Hgb improved to 9.1. Slight thrombocytosis. ALT/AST elevated similar to prior, suspect 2/2 iron deposition. Confirmed <3000mg Tylenol daily. Monitor. Otherwise labs stable.      Meds: Stopped Voxeletor. Continue Hydrea 2000mg daily. Contineu Jadenu 4 tablets daily. Now on Desferral over 48 hours every other weekend-will confirm this with care coordinator.      Pain Control: Continue OxyContin 10mg BID. Continue Oxycodone to 15mg PRN and this is working well. Continue Tylenol PRN, and Celebrex PRN. OK to use Voltaren gel as needed.    Did prescribe EMLA cream for port.      Follow-up: Will see her in one week in person at her request. Has follow up with Dr. Duncan 6/4.     2. Neuro  History of stroke, no new concerns. ASA on hold while on anticoagulation for PE.      PT referral for right leg  weakness-not discussed today, referral was placed last week.      3. Psych  History of anxiety and depression. Continue Sertraline 200mg daily and Abilify 2mg daily. Depression improved and mood is bright today. Still has episodes of high anxiety. To minimize drug interactions would prefer not to use benzos for panic symptoms. As such will prescribe Hydroxyzine PRN.     Follows with outside therapist every week.     OK to use Benadryl PRN insomnia.      4. Pulm  History of asthma, continue Symbicort and Albuterol PRN.      Bilateral PE diagnosed 2/1/21 was on Xarelto. RUE DVT 3/25/21 switched to Eliquis. Worsening PE diagnosed 4/27/21, heme consult inpatient, Eliquis levels were low, switched back to Xarelto and levels were WNL. Now on 15mg BID until 5/29, then switch to 20mg daily. No recurrent signs of VTE.    Acute chest/hypoxic respiratory failure/pneumonia: Issues inpatient, received abx and exchange x 2 with improvement in O2. Discharged without oxygen. Denies any breathing concerns or fevers. Sating 100% on RA.    5. Other  Will talk to Dr. Duncan about COVID vaccine, likely recommend Pfzier or moderna to minimize clotting risk.    Did not discuss sickle cell health maintenance today.     30 minutes spent on the date of the encounter doing chart review, review of test results, interpretation of tests, patient visit and documentation     Andrei Machado PA-C  [facility]  [address] [place] [address]  [phone]          RONALDO Allan

## 2021-05-22 ENCOUNTER — HOSPITAL ENCOUNTER (EMERGENCY)
Facility: CLINIC | Age: 21
Discharge: HOME | End: 2021-05-22
Attending: EMERGENCY MEDICINE | Admitting: EMERGENCY MEDICINE
Payer: COMMERCIAL

## 2021-05-22 VITALS
WEIGHT: 163 LBS | HEIGHT: 64 IN | OXYGEN SATURATION: 98 % | RESPIRATION RATE: 18 BRPM | DIASTOLIC BLOOD PRESSURE: 96 MMHG | BODY MASS INDEX: 27.83 KG/M2 | TEMPERATURE: 98.7 F | SYSTOLIC BLOOD PRESSURE: 139 MMHG | HEART RATE: 92 BPM

## 2021-05-22 DIAGNOSIS — D57.00 SICKLE CELL ANEMIA WITH PAIN (H): ICD-10-CM

## 2021-05-22 LAB
ALBUMIN SERPL-MCNC: 3.7 G/DL (ref 3.4–5)
ALBUMIN UR-MCNC: NEGATIVE MG/DL
ALP SERPL-CCNC: 86 U/L (ref 40–150)
ALT SERPL W P-5'-P-CCNC: 150 U/L (ref 0–50)
ANION GAP SERPL CALCULATED.3IONS-SCNC: 6 MMOL/L (ref 3–14)
APPEARANCE UR: CLEAR
AST SERPL W P-5'-P-CCNC: 146 U/L (ref 0–45)
BASOPHILS # BLD AUTO: 0.3 10E9/L (ref 0–0.2)
BASOPHILS NFR BLD AUTO: 2.2 %
BILIRUB SERPL-MCNC: 1.5 MG/DL (ref 0.2–1.3)
BILIRUB UR QL STRIP: NEGATIVE
BUN SERPL-MCNC: 6 MG/DL (ref 7–30)
CALCIUM SERPL-MCNC: 9 MG/DL (ref 8.5–10.1)
CHLORIDE SERPL-SCNC: 104 MMOL/L (ref 94–109)
CO2 SERPL-SCNC: 26 MMOL/L (ref 20–32)
COLOR UR AUTO: ABNORMAL
CREAT SERPL-MCNC: 0.44 MG/DL (ref 0.52–1.04)
DIFFERENTIAL METHOD BLD: ABNORMAL
EOSINOPHIL # BLD AUTO: 0.7 10E9/L (ref 0–0.7)
EOSINOPHIL NFR BLD AUTO: 5.9 %
ERYTHROCYTE [DISTWIDTH] IN BLOOD BY AUTOMATED COUNT: 20.3 % (ref 10–15)
GFR SERPL CREATININE-BSD FRML MDRD: >90 ML/MIN/{1.73_M2}
GLUCOSE SERPL-MCNC: 101 MG/DL (ref 70–99)
GLUCOSE UR STRIP-MCNC: NEGATIVE MG/DL
HCT VFR BLD AUTO: 28.2 % (ref 35–47)
HGB BLD-MCNC: 9.2 G/DL (ref 11.7–15.7)
HGB UR QL STRIP: NEGATIVE
IMM GRANULOCYTES # BLD: 0 10E9/L (ref 0–0.4)
IMM GRANULOCYTES NFR BLD: 0.2 %
KETONES UR STRIP-MCNC: NEGATIVE MG/DL
LEUKOCYTE ESTERASE UR QL STRIP: NEGATIVE
LYMPHOCYTES # BLD AUTO: 3.2 10E9/L (ref 0.8–5.3)
LYMPHOCYTES NFR BLD AUTO: 25.4 %
MCH RBC QN AUTO: 29.4 PG (ref 26.5–33)
MCHC RBC AUTO-ENTMCNC: 32.6 G/DL (ref 31.5–36.5)
MCV RBC AUTO: 90 FL (ref 78–100)
MONOCYTES # BLD AUTO: 1 10E9/L (ref 0–1.3)
MONOCYTES NFR BLD AUTO: 7.6 %
MUCOUS THREADS #/AREA URNS LPF: PRESENT /LPF
NEUTROPHILS # BLD AUTO: 7.3 10E9/L (ref 1.6–8.3)
NEUTROPHILS NFR BLD AUTO: 58.7 %
NITRATE UR QL: NEGATIVE
NRBC # BLD AUTO: 0.1 10*3/UL
NRBC BLD AUTO-RTO: 1 /100
PH UR STRIP: 6 PH (ref 5–7)
PLATELET # BLD AUTO: 468 10E9/L (ref 150–450)
POTASSIUM SERPL-SCNC: 3.6 MMOL/L (ref 3.4–5.3)
PROT SERPL-MCNC: 8.2 G/DL (ref 6.8–8.8)
RBC # BLD AUTO: 3.13 10E12/L (ref 3.8–5.2)
RBC #/AREA URNS AUTO: <1 /HPF (ref 0–2)
RETICS # AUTO: 322.4 10E9/L (ref 25–95)
RETICS/RBC NFR AUTO: 10.3 % (ref 0.5–2)
SODIUM SERPL-SCNC: 136 MMOL/L (ref 133–144)
SOURCE: ABNORMAL
SP GR UR STRIP: 1.01 (ref 1–1.03)
SQUAMOUS #/AREA URNS AUTO: <1 /HPF (ref 0–1)
UROBILINOGEN UR STRIP-MCNC: NORMAL MG/DL (ref 0–2)
WBC # BLD AUTO: 12.4 10E9/L (ref 4–11)
WBC #/AREA URNS AUTO: <1 /HPF (ref 0–5)

## 2021-05-22 PROCEDURE — 96374 THER/PROPH/DIAG INJ IV PUSH: CPT | Performed by: EMERGENCY MEDICINE

## 2021-05-22 PROCEDURE — 80053 COMPREHEN METABOLIC PANEL: CPT | Performed by: EMERGENCY MEDICINE

## 2021-05-22 PROCEDURE — 85025 COMPLETE CBC W/AUTO DIFF WBC: CPT | Performed by: EMERGENCY MEDICINE

## 2021-05-22 PROCEDURE — 96376 TX/PRO/DX INJ SAME DRUG ADON: CPT | Performed by: EMERGENCY MEDICINE

## 2021-05-22 PROCEDURE — 99285 EMERGENCY DEPT VISIT HI MDM: CPT | Performed by: EMERGENCY MEDICINE

## 2021-05-22 PROCEDURE — 81001 URINALYSIS AUTO W/SCOPE: CPT | Performed by: EMERGENCY MEDICINE

## 2021-05-22 PROCEDURE — 258N000003 HC RX IP 258 OP 636: Performed by: EMERGENCY MEDICINE

## 2021-05-22 PROCEDURE — 99285 EMERGENCY DEPT VISIT HI MDM: CPT | Mod: 25 | Performed by: EMERGENCY MEDICINE

## 2021-05-22 PROCEDURE — 85045 AUTOMATED RETICULOCYTE COUNT: CPT | Performed by: EMERGENCY MEDICINE

## 2021-05-22 PROCEDURE — 250N000011 HC RX IP 250 OP 636: Performed by: EMERGENCY MEDICINE

## 2021-05-22 PROCEDURE — 96361 HYDRATE IV INFUSION ADD-ON: CPT | Performed by: EMERGENCY MEDICINE

## 2021-05-22 RX ORDER — HEPARIN SODIUM (PORCINE) LOCK FLUSH IV SOLN 100 UNIT/ML 100 UNIT/ML
5 SOLUTION INTRAVENOUS
Status: DISCONTINUED | OUTPATIENT
Start: 2021-05-22 | End: 2021-05-22 | Stop reason: HOSPADM

## 2021-05-22 RX ORDER — MORPHINE SULFATE 4 MG/ML
2 INJECTION, SOLUTION INTRAMUSCULAR; INTRAVENOUS
Status: DISCONTINUED | OUTPATIENT
Start: 2021-05-22 | End: 2021-05-22 | Stop reason: HOSPADM

## 2021-05-22 RX ORDER — DIPHENHYDRAMINE HCL 50 MG
50 CAPSULE ORAL ONCE
Status: DISCONTINUED | OUTPATIENT
Start: 2021-05-22 | End: 2021-05-22 | Stop reason: HOSPADM

## 2021-05-22 RX ADMIN — SODIUM CHLORIDE 1000 ML: 9 INJECTION, SOLUTION INTRAVENOUS at 16:28

## 2021-05-22 RX ADMIN — Medication 5 ML: at 20:10

## 2021-05-22 RX ADMIN — MORPHINE SULFATE 2 MG: 4 INJECTION INTRAVENOUS at 16:28

## 2021-05-22 RX ADMIN — MORPHINE SULFATE 2 MG: 4 INJECTION INTRAVENOUS at 19:14

## 2021-05-22 RX ADMIN — MORPHINE SULFATE 2 MG: 4 INJECTION INTRAVENOUS at 17:48

## 2021-05-22 ASSESSMENT — ENCOUNTER SYMPTOMS
DIARRHEA: 0
BACK PAIN: 1
VOMITING: 0
FEVER: 0
NAUSEA: 0
DYSURIA: 0
ABDOMINAL PAIN: 0
SHORTNESS OF BREATH: 0
FREQUENCY: 0

## 2021-05-22 ASSESSMENT — MIFFLIN-ST. JEOR: SCORE: 1484.36

## 2021-05-22 NOTE — ED TRIAGE NOTES
"Triage Assessment & Note:    /84   Pulse 100   Temp 98.7  F (37.1  C) (Temporal)   Resp 16   Ht 1.626 m (5' 4\")   Wt 73.9 kg (163 lb)   SpO2 97%   BMI 27.98 kg/m        Patient presents with: Pt comes to triage with reports of sickle cell pain in her back. No reports of fever, cough, SOB, CP, or travel.     Home Treatments/Remedies: home medications    Febrile / Afebrile: afebrile    Duration of C/o: < 12 hrs    Jennifer Chauhan RN  May 22, 2021        "

## 2021-05-22 NOTE — DISCHARGE INSTRUCTIONS
You have been seen in the emergency department today for back pain related to your sickle cell disease.  You are feeling better after your treatment with your care plan.  Your labs look good/baseline for you.  We recommend that you continue to take your regular medications at home and follow-up with your hematology clinic.  Return to the ED for worsening symptoms.

## 2021-05-22 NOTE — ED NOTES
Patient was signed out to me at change of shift by Dr. Cruz, please see his note for full details.  Briefly, this is a 21-year-old female with a known history of sickle cell disease who presented with low back pain very consistent with her prior sickle cell flares.  At time of signout, labs were pending and plan was to reassess response to her treatment with her care plan.    Results for orders placed or performed during the hospital encounter of 05/22/21   CBC with platelets differential     Status: Abnormal   Result Value Ref Range    WBC 12.4 (H) 4.0 - 11.0 10e9/L    RBC Count 3.13 (L) 3.8 - 5.2 10e12/L    Hemoglobin 9.2 (L) 11.7 - 15.7 g/dL    Hematocrit 28.2 (L) 35.0 - 47.0 %    MCV 90 78 - 100 fl    MCH 29.4 26.5 - 33.0 pg    MCHC 32.6 31.5 - 36.5 g/dL    RDW 20.3 (H) 10.0 - 15.0 %    Platelet Count 468 (H) 150 - 450 10e9/L    Diff Method Automated Method     % Neutrophils 58.7 %    % Lymphocytes 25.4 %    % Monocytes 7.6 %    % Eosinophils 5.9 %    % Basophils 2.2 %    % Immature Granulocytes 0.2 %    Nucleated RBCs 1 (H) 0 /100    Absolute Neutrophil 7.3 1.6 - 8.3 10e9/L    Absolute Lymphocytes 3.2 0.8 - 5.3 10e9/L    Absolute Monocytes 1.0 0.0 - 1.3 10e9/L    Absolute Eosinophils 0.7 0.0 - 0.7 10e9/L    Absolute Basophils 0.3 (H) 0.0 - 0.2 10e9/L    Abs Immature Granulocytes 0.0 0 - 0.4 10e9/L    Absolute Nucleated RBC 0.1    Comprehensive metabolic panel     Status: Abnormal   Result Value Ref Range    Sodium 136 133 - 144 mmol/L    Potassium 3.6 3.4 - 5.3 mmol/L    Chloride 104 94 - 109 mmol/L    Carbon Dioxide 26 20 - 32 mmol/L    Anion Gap 6 3 - 14 mmol/L    Glucose 101 (H) 70 - 99 mg/dL    Urea Nitrogen 6 (L) 7 - 30 mg/dL    Creatinine 0.44 (L) 0.52 - 1.04 mg/dL    GFR Estimate >90 >60 mL/min/[1.73_m2]    GFR Estimate If Black >90 >60 mL/min/[1.73_m2]    Calcium 9.0 8.5 - 10.1 mg/dL    Bilirubin Total 1.5 (H) 0.2 - 1.3 mg/dL    Albumin 3.7 3.4 - 5.0 g/dL    Protein Total 8.2 6.8 - 8.8 g/dL     Alkaline Phosphatase 86 40 - 150 U/L     (H) 0 - 50 U/L     (H) 0 - 45 U/L   Reticulocyte count     Status: Abnormal   Result Value Ref Range    % Retic 10.3 (H) 0.5 - 2.0 %    Absolute Retic 322.4 (H) 25 - 95 10e9/L   UA with Microscopic reflex to Culture     Status: Abnormal    Specimen: Urine Midstream; Midstream Urine   Result Value Ref Range    Color Urine Light Yellow     Appearance Urine Clear     Glucose Urine Negative NEG^Negative mg/dL    Bilirubin Urine Negative NEG^Negative    Ketones Urine Negative NEG^Negative mg/dL    Specific Gravity Urine 1.008 1.003 - 1.035    Blood Urine Negative NEG^Negative    pH Urine 6.0 5.0 - 7.0 pH    Protein Albumin Urine Negative NEG^Negative mg/dL    Urobilinogen mg/dL Normal 0.0 - 2.0 mg/dL    Nitrite Urine Negative NEG^Negative    Leukocyte Esterase Urine Negative NEG^Negative    Source Midstream Urine     WBC Urine <1 0 - 5 /HPF    RBC Urine <1 0 - 2 /HPF    Squamous Epithelial /HPF Urine <1 0 - 1 /HPF    Mucous Urine Present (A) NEG^Negative /LPF     Labs show white count of 12.4, hemoglobin 9.2.  This is fairly consistent with prior labs.  White count is a little bit up from prior though she has had higher levels during her recent hospitalization.  Suspect likely demargination/physiologic stress.  LFTs are elevated though they were elevated previously, review of notes show that they believe this was likely due to iron deposition.  Reticulocyte count is 10.3%.  Electrolytes and creatinine appear to be within normal limits.  Due to her leukocytosis and back pain we did do a UA which does not show any evidence of infection at this time.  I rechecked on the patient and she states she is feeling better and specifically denies infectious symptoms including fever, cough, sore throat, nasal congestion, abdominal pain, nausea, vomiting, diarrhea, or urinary symptoms.    After treatment with her care plan here in the emergency department she is feeling better and  is ready to discharge.  Will recommend that she follow-up with her hematologist in clinic and continue all of her regular medications.  Patient is comfortable with this plan and verbalizes understanding of instructions.     Leslie Roper MD  05/22/21 1942

## 2021-05-22 NOTE — ED PROVIDER NOTES
[place] EMERGENCY DEPARTMENT ([facility])  5/22/21  History     Chief Complaint   Patient presents with     Sickle Cell Pain Crisis     Back Pain     The history is provided by the patient and medical records.     Jennifer Cervantes is a 21 year old female with a history notable for sickle cell disease, PE, cognitive delays and right upper extremity hemiparesis 2/2 CVA, iron overload 2/2 chronic transfusions, asthma, depression and anxiety who presents to the ED for evaluation of back pain. The patient began having severe low back pain earlier today which she states is typical for her sickle-cell flares. The patient took her prescribed pain medication and took a hot bath without relief.  She denies experiencing fevers, chest pain, shortness of breath, nausea, vomiting, diarrhea, dysuria or frequency.  Patient currently denies suicidal ideation or self-harm.  She states she has been dealing with depression since she was a child.  She states whenever she does feel sad, she states that hotel to be alone which seems to help her.  Patient does follow with a psychiatrist weekly.      I have reviewed the Medications, Allergies, Past Medical and Surgical History, and Social History in the [employer] system.  PAST MEDICAL HISTORY:   Past Medical History:   Diagnosis Date     Anemia      Anxiety      Bleeding disorder (H)      Blood clotting disorder (H)      Cerebral infarction (H) 2015     Cognitive developmental delay     low IQ. 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 (H)     right hand contractures     Iron overload due to repeated red blood cell transfusions      Migraines      Multiple subsegmental pulmonary emboli without acute cor pulmonale (H) 02/01/2021     Oppositional defiant behavior      Superficial venous thrombosis of arm, right 03/25/2021     Uncomplicated asthma        PAST  SURGICAL HISTORY:   Past Surgical History:   Procedure Laterality Date     AS INSERT TUNNELED CV 2 CATH W/O PORT/PUMP       C BREAST REDUCTION (INCLUDES LIPO) TIER 3 Bilateral 04/23/2019     CHOLECYSTECTOMY       INSERT PORT VASCULAR ACCESS Left 4/21/2021    Procedure: INSERTION, VASCULAR ACCESS PORT (NOT SURE ON SIDE UNTIL REMOVAL);  Surgeon: Rajan More MD;  Location: [facility] OR     IR CHEST PORT PLACEMENT > 5 YRS OF AGE  4/21/2021     IR CVC NON TUNNEL LINE REMOVAL  5/6/2021     IR CVC NON TUNNEL PLACEMENT  04/07/2020     IR PORT REMOVAL LEFT  4/21/2021     REMOVE PORT VASCULAR ACCESS Left 4/21/2021    Procedure: REMOVAL, VASCULAR ACCESS PORT LEFT;  Surgeon: Rajan More MD;  Location: [facility] OR     REPAIR TENDON ELBOW Right 10/02/2019    Procedure: Right Elbow Flexor Lengthening, Flexor Pronator Slide Of Wrist and Finger, Thumb Adductor Lengthening;  Surgeon: Anai Franco MD;  Location: [facility] OR     TONSILLECTOMY Bilateral 10/02/2019    Procedure: Bilateral Tonsillectomy;  Surgeon: Farhana Guy MD;  Location: [facility] OR       Past medical history, past surgical history, medications, and allergies were reviewed with the patient. Additional pertinent items: None    FAMILY HISTORY:   Family History   Problem Relation Age of Onset     Sickle Cell Trait Mother      Hypertension Mother      Asthma Mother      Sickle Cell Trait Father        SOCIAL HISTORY:   Social History     Tobacco Use     Smoking status: Never Smoker     Smokeless tobacco: Never Used   Substance Use Topics     Alcohol use: Not Currently     Alcohol/week: 0.0 standard drinks     Social history was reviewed with the patient. Additional pertinent items: None      Discharge Medication List as of 5/22/2021  8:04 PM      CONTINUE these medications which have NOT CHANGED    Details   acetaminophen (TYLENOL) 325 MG tablet Take 2 tablets (650 mg) by mouth every 6 hours as needed for mild pain, Disp-120 tablet, R-3, E-Prescribe       albuterol (PROAIR HFA/PROVENTIL HFA/VENTOLIN HFA) 108 (90 Base) MCG/ACT inhaler Inhale 2 puffs into the lungs every 6 hours as needed for shortness of breath / dyspnea or wheezing, Disp-8.5 g, R-3, E-PrescribePharmacy may dispense brand covered by insurance (Proair, or proventil or ventolin or generic albuterol inhaler)      albuterol (PROVENTIL) (2.5 MG/3ML) 0.083% neb solution Take 1 vial (2.5 mg) by nebulization every 6 hours as needed for shortness of breath / dyspnea or wheezing, Disp-12 mL, R-4, E-Prescribe      ARIPiprazole (ABILIFY) 2 MG tablet Take 1 tablet (2 mg) by mouth daily, Disp-30 tablet, R-3, E-Prescribe      budesonide-formoterol (SYMBICORT) 160-4.5 MCG/ACT Inhaler Inhale 1 puff into the lungs every evening, Disp-10.2 g, R-3, Historical      celecoxib (CELEBREX) 100 MG capsule Take 1 capsule (100 mg) by mouth 2 times daily, Disp-60 capsule, R-3, E-Prescribe      diclofenac (VOLTAREN) 1 % topical gel Apply 4 g topically 4 times daily as needed for moderate pain Apply to back, legs, and arms for pain, Disp-150 g, R-3, E-Prescribe      diphenhydrAMINE (BENADRYL) 25 MG capsule Take 1-2 capsules (25-50 mg) by mouth nightly as needed for sleep, Disp-60 capsule, R-3, E-Prescribe      JADENU 360 MG tablet Take 4 tablets (1,440 mg) by mouth every evening, Disp-30 tablet, R-0, SHIELA, E-Prescribe      lidocaine-prilocaine (EMLA) 2.5-2.5 % external cream Apply topically as needed for moderate painDisp-30 g, [id number]      ondansetron (ZOFRAN) 8 MG tablet Historical      oxyCODONE (OXYCONTIN) 10 MG 12 hr tablet Take 1 tablet (10 mg) by mouth every 12 hours, Disp-60 tablet, R-0, E-Prescribe      oxyCODONE IR (ROXICODONE) 15 MG tablet Take 1 tablet (15mg) by mouth every 4-6 hours as needed for severe pain. Goal 4 per day. Max 6 per day., Disp-60 tablet, R-0, E-Prescribe      !! rivaroxaban ANTICOAGULANT (XARELTO ANTICOAGULANT) 20 MG TABS tablet Take 1 tablet (20 mg) by mouth daily (with dinner) Starting on  "5/29 after completing your 15mg twice daily dosing on 5/28, Disp-30 tablet, R-2, E-Prescribe      !! rivaroxaban ANTICOAGULANT (XARELTO) 15 MG TABS tablet Take 1 tablet (15 mg) by mouth 2 times daily (with meals) for 17 days, Disp-34 tablet, R-0, E-Prescribe      sertraline (ZOLOFT) 100 MG tablet Take 2 tablets (200 mg) by mouth daily, Disp-180 tablet, R-3, E-Prescribe      EPINEPHrine (ANY BX GENERIC EQUIV) 0.3 MG/0.3ML injection 2-pack Inject 0.3 mLs (0.3 mg) into the muscle as needed for anaphylaxis, Disp-1 each, R-1, E-Prescribe      Hydroxyurea 1000 MG TABS Take 2,000 mg by mouth daily, Disp-60 tablet, R-3, E-Prescribe      hydrOXYzine (ATARAX) 25 MG tablet Take 1 tablet (25 mg) by mouth 3 times daily as needed for anxiety, Disp-30 tablet, R-1, E-Prescribe      medroxyPROGESTERone (DEPO-PROVERA) 150 MG/ML IM injection Inject 150 mg into the muscle, Historical      naloxone (NARCAN) 4 MG/0.1ML nasal spray Spray 1 spray (4 mg) into one nostril alternating nostrils once as needed for opioid reversal every 2-3 minutes until assistance arrives, Disp-0.2 mL,R-1, E-Prescribe       !! - Potential duplicate medications found. Please discuss with provider.             Allergies   Allergen Reactions     Contrast Dye      Hives and breathing issues     Fish-Derived Products Hives     Seafood Hives     Diagnostic X-Ray Materials      Gadolinium         Review of Systems   Constitutional: Negative for fever.   Respiratory: Negative for shortness of breath.    Cardiovascular: Negative for chest pain.   Gastrointestinal: Negative for abdominal pain, diarrhea, nausea and vomiting.   Genitourinary: Negative for dysuria and frequency.   Musculoskeletal: Positive for back pain.     A complete review of systems was performed with pertinent positives and negatives noted in the HPI, and all other systems negative.    Physical Exam   BP: 125/84  Pulse: 100  Temp: 98.7  F (37.1  C)  Resp: 16  Height: 162.6 cm (5' 4\")  Weight: 73.9 kg " (163 lb)  SpO2: 97 %      Physical Exam  Vitals signs and nursing note reviewed.   Constitutional:       General: She is not in acute distress.     Appearance: She is well-developed. She is not ill-appearing, toxic-appearing or diaphoretic.      Comments: Patient is awake and alert, she appears uncomfortable but is otherwise mentating normally and protecting her airway without difficulty.   HENT:      Head: Normocephalic and atraumatic.      Mouth/Throat:      Lips: Pink.      Mouth: Mucous membranes are moist.      Pharynx: Oropharynx is clear. No oropharyngeal exudate.   Eyes:      General: Lids are normal. No scleral icterus.     Extraocular Movements: Extraocular movements intact.      Right eye: No nystagmus.      Left eye: No nystagmus.      Conjunctiva/sclera: Conjunctivae normal.      Pupils: Pupils are equal, round, and reactive to light.   Neck:      Musculoskeletal: Normal range of motion and neck supple. No erythema or neck rigidity.      Thyroid: No thyromegaly.      Vascular: No JVD.      Trachea: No tracheal deviation.   Cardiovascular:      Rate and Rhythm: Normal rate and regular rhythm.      Pulses: Normal pulses.      Heart sounds: Normal heart sounds. No murmur. No friction rub. No gallop.    Pulmonary:      Effort: Pulmonary effort is normal. No respiratory distress.      Breath sounds: Normal breath sounds.   Abdominal:      General: Bowel sounds are normal. There is no distension.      Palpations: Abdomen is soft. There is no mass.      Tenderness: There is no abdominal tenderness. There is no guarding or rebound.   Musculoskeletal: Normal range of motion.         General: No tenderness.      Right lower leg: No edema.      Left lower leg: No edema.   Lymphadenopathy:      Cervical: No cervical adenopathy.   Skin:     General: Skin is warm and dry.      Capillary Refill: Capillary refill takes less than 2 seconds.      Coloration: Skin is not pale.      Findings: No erythema or rash.    Neurological:      Mental Status: She is alert and oriented to person, place, and time.      Cranial Nerves: No cranial nerve deficit.      Motor: Weakness present.      Comments: Right-sided hemiparesis of upper extremity noted.  This is chronic.   Psychiatric:         Mood and Affect: Mood and affect normal.         Speech: Speech normal.         Behavior: Behavior normal.         ED Course   3:50 PM  The patient was seen and examined by Dr. Isael Cruz in Room ED 28.      Procedures          Labs:    Pending      Medications   0.9% sodium chloride BOLUS (0 mLs Intravenous Stopped 5/22/21 1900)             Assessments & Plan (with Medical Decision Making)   This patient presented to the emergency department complaining of low back pain which is typical for her sickle cell pain crises.  She is afebrile and appears in no acute distress.  Blood work is pending at this point.  I have started treatment of her pain crisis as per her care plan.  Patient will be signed out to the oncoming physician with plan to continue care plan and reassess after blood work is returned.  Patient will be discharged if possible and admitted if needed.    I have reviewed the nursing notes.    I have reviewed the findings, diagnosis, plan and need for follow up with the patient.    This part of the medical record was transcribed by Ashley Johnson, Medical Scribe, from a dictation done by Anil Cruz MD.     Discharge Medication List as of 5/22/2021  8:04 PM          Final diagnoses:   Sickle cell anemia with pain (H)     IJered, am serving as a trained medical scribe to document services personally performed by Isael Cruz MD, based on the provider's statements to me.      Isael NEWMAN MD, was physically present and have reviewed and verified the accuracy of this note documented by Jered Carr.     5/22/2021   [facility] EMERGENCY DEPARTMENT     Isael Cruz MD  05/24/21 [id number]

## 2021-05-24 ENCOUNTER — HOSPITAL ENCOUNTER (EMERGENCY)
Facility: CLINIC | Age: 21
Discharge: HOME | End: 2021-05-25
Attending: EMERGENCY MEDICINE | Admitting: EMERGENCY MEDICINE
Payer: COMMERCIAL

## 2021-05-24 ENCOUNTER — PATIENT OUTREACH (OUTPATIENT)
Dept: ONCOLOGY | Facility: CLINIC | Age: 21
End: 2021-05-24

## 2021-05-24 DIAGNOSIS — D57.00 SICKLE CELL PAIN CRISIS (H): ICD-10-CM

## 2021-05-24 LAB
ALBUMIN SERPL-MCNC: 4 G/DL (ref 3.4–5)
ALP SERPL-CCNC: 92 U/L (ref 40–150)
ALT SERPL W P-5'-P-CCNC: 188 U/L (ref 0–50)
ANION GAP SERPL CALCULATED.3IONS-SCNC: 7 MMOL/L (ref 3–14)
AST SERPL W P-5'-P-CCNC: 167 U/L (ref 0–45)
BILIRUB SERPL-MCNC: 2 MG/DL (ref 0.2–1.3)
BUN SERPL-MCNC: 7 MG/DL (ref 7–30)
CALCIUM SERPL-MCNC: 9.2 MG/DL (ref 8.5–10.1)
CHLORIDE SERPL-SCNC: 105 MMOL/L (ref 94–109)
CO2 SERPL-SCNC: 25 MMOL/L (ref 20–32)
CREAT SERPL-MCNC: 0.52 MG/DL (ref 0.52–1.04)
GFR SERPL CREATININE-BSD FRML MDRD: >90 ML/MIN/{1.73_M2}
GLUCOSE SERPL-MCNC: 84 MG/DL (ref 70–99)
POTASSIUM SERPL-SCNC: 3.5 MMOL/L (ref 3.4–5.3)
PROT SERPL-MCNC: 8.8 G/DL (ref 6.8–8.8)
SODIUM SERPL-SCNC: 136 MMOL/L (ref 133–144)

## 2021-05-24 PROCEDURE — 96374 THER/PROPH/DIAG INJ IV PUSH: CPT | Performed by: EMERGENCY MEDICINE

## 2021-05-24 PROCEDURE — 250N000013 HC RX MED GY IP 250 OP 250 PS 637: Performed by: EMERGENCY MEDICINE

## 2021-05-24 PROCEDURE — 96361 HYDRATE IV INFUSION ADD-ON: CPT | Performed by: EMERGENCY MEDICINE

## 2021-05-24 PROCEDURE — 80053 COMPREHEN METABOLIC PANEL: CPT | Performed by: EMERGENCY MEDICINE

## 2021-05-24 PROCEDURE — 258N000003 HC RX IP 258 OP 636: Performed by: EMERGENCY MEDICINE

## 2021-05-24 PROCEDURE — 85045 AUTOMATED RETICULOCYTE COUNT: CPT | Performed by: EMERGENCY MEDICINE

## 2021-05-24 PROCEDURE — 96375 TX/PRO/DX INJ NEW DRUG ADDON: CPT | Performed by: EMERGENCY MEDICINE

## 2021-05-24 PROCEDURE — 85025 COMPLETE CBC W/AUTO DIFF WBC: CPT | Performed by: EMERGENCY MEDICINE

## 2021-05-24 PROCEDURE — 99284 EMERGENCY DEPT VISIT MOD MDM: CPT | Performed by: EMERGENCY MEDICINE

## 2021-05-24 PROCEDURE — 250N000011 HC RX IP 250 OP 636: Performed by: EMERGENCY MEDICINE

## 2021-05-24 PROCEDURE — 99285 EMERGENCY DEPT VISIT HI MDM: CPT | Mod: 25 | Performed by: EMERGENCY MEDICINE

## 2021-05-24 RX ORDER — ONDANSETRON 2 MG/ML
4 INJECTION INTRAMUSCULAR; INTRAVENOUS ONCE
Status: COMPLETED | OUTPATIENT
Start: 2021-05-24 | End: 2021-05-24

## 2021-05-24 RX ORDER — MORPHINE SULFATE 2 MG/ML
2 INJECTION, SOLUTION INTRAMUSCULAR; INTRAVENOUS ONCE
Status: COMPLETED | OUTPATIENT
Start: 2021-05-24 | End: 2021-05-24

## 2021-05-24 RX ORDER — ACETAMINOPHEN 325 MG/1
650 TABLET ORAL ONCE
Status: COMPLETED | OUTPATIENT
Start: 2021-05-24 | End: 2021-05-24

## 2021-05-24 RX ADMIN — SODIUM CHLORIDE 1000 ML: 9 INJECTION, SOLUTION INTRAVENOUS at 23:15

## 2021-05-24 RX ADMIN — ONDANSETRON 4 MG: 2 INJECTION INTRAMUSCULAR; INTRAVENOUS at 23:15

## 2021-05-24 RX ADMIN — ACETAMINOPHEN 650 MG: 325 TABLET, FILM COATED ORAL at 23:15

## 2021-05-24 RX ADMIN — MORPHINE SULFATE 2 MG: 2 INJECTION, SOLUTION INTRAMUSCULAR; INTRAVENOUS at 23:15

## 2021-05-24 ASSESSMENT — ENCOUNTER SYMPTOMS
DIARRHEA: 0
RHINORRHEA: 1
BACK PAIN: 1
PALPITATIONS: 0
ARTHRALGIAS: 1
FREQUENCY: 0
CONFUSION: 0
DYSURIA: 0
CHILLS: 0
NECK STIFFNESS: 0
ABDOMINAL PAIN: 0
DIFFICULTY URINATING: 0
NAUSEA: 0
SHORTNESS OF BREATH: 0
MYALGIAS: 1
COUGH: 0
SORE THROAT: 0
FLANK PAIN: 0
FEVER: 0
HEADACHES: 1
COLOR CHANGE: 0

## 2021-05-24 ASSESSMENT — MIFFLIN-ST. JEOR: SCORE: 1470.76

## 2021-05-24 NOTE — PROGRESS NOTES
Jennifer calls to report she is having sickle cell pain in her lower back. She has used all of her available pain medication. It started late last night around 10-11pm. She did not get much sleep because of it. She is eating and drinking and does not shortness of breath, a cough or fever.     Ok per Dr. Duncan to have fluids and pain meds, no labs or provider visit needed. She was added to infusion list for today.    Syeda Ulrich RN

## 2021-05-24 NOTE — ED TRIAGE NOTES
Triage Assessment & Note:      Patient presents with: PT c/o sickle cell pain all over her body for several hours.     Home Treatments/Remedies: Prescribed pain meds     Febrile / Afebrile? Afebrile     Duration of C/o: several hours     Feliz Marquez RN  May 24, 2021

## 2021-05-25 ENCOUNTER — TELEPHONE (OUTPATIENT)
Dept: ONCOLOGY | Facility: CLINIC | Age: 21
End: 2021-05-25

## 2021-05-25 VITALS
DIASTOLIC BLOOD PRESSURE: 86 MMHG | OXYGEN SATURATION: 96 % | TEMPERATURE: 98.1 F | WEIGHT: 160 LBS | SYSTOLIC BLOOD PRESSURE: 122 MMHG | RESPIRATION RATE: 18 BRPM | HEART RATE: 94 BPM | BODY MASS INDEX: 27.31 KG/M2 | HEIGHT: 64 IN

## 2021-05-25 VITALS
TEMPERATURE: 97.1 F | DIASTOLIC BLOOD PRESSURE: 72 MMHG | BODY MASS INDEX: 28.17 KG/M2 | RESPIRATION RATE: 18 BRPM | WEIGHT: 165 LBS | SYSTOLIC BLOOD PRESSURE: 118 MMHG | HEIGHT: 64 IN | OXYGEN SATURATION: 95 % | HEART RATE: 110 BPM

## 2021-05-25 DIAGNOSIS — D57.00 SICKLE CELL PAIN CRISIS (H): ICD-10-CM

## 2021-05-25 LAB
ALBUMIN SERPL-MCNC: 3.5 G/DL (ref 3.4–5)
ALP SERPL-CCNC: 84 U/L (ref 40–150)
ALT SERPL W P-5'-P-CCNC: 172 U/L (ref 0–50)
ANION GAP SERPL CALCULATED.3IONS-SCNC: 6 MMOL/L (ref 3–14)
AST SERPL W P-5'-P-CCNC: 152 U/L (ref 0–45)
BASOPHILS # BLD AUTO: 0.2 10E9/L (ref 0–0.2)
BASOPHILS # BLD AUTO: 0.3 10E9/L (ref 0–0.2)
BASOPHILS NFR BLD AUTO: 1.9 %
BASOPHILS NFR BLD AUTO: 2.1 %
BILIRUB SERPL-MCNC: 1.7 MG/DL (ref 0.2–1.3)
BUN SERPL-MCNC: 9 MG/DL (ref 7–30)
CALCIUM SERPL-MCNC: 8.7 MG/DL (ref 8.5–10.1)
CHLORIDE SERPL-SCNC: 106 MMOL/L (ref 94–109)
CO2 SERPL-SCNC: 26 MMOL/L (ref 20–32)
CREAT SERPL-MCNC: 0.56 MG/DL (ref 0.52–1.04)
DIFFERENTIAL METHOD BLD: ABNORMAL
DIFFERENTIAL METHOD BLD: ABNORMAL
EOSINOPHIL # BLD AUTO: 1.4 10E9/L (ref 0–0.7)
EOSINOPHIL # BLD AUTO: 1.4 10E9/L (ref 0–0.7)
EOSINOPHIL NFR BLD AUTO: 12.3 %
EOSINOPHIL NFR BLD AUTO: 9.9 %
ERYTHROCYTE [DISTWIDTH] IN BLOOD BY AUTOMATED COUNT: 20.4 % (ref 10–15)
ERYTHROCYTE [DISTWIDTH] IN BLOOD BY AUTOMATED COUNT: 20.5 % (ref 10–15)
GFR SERPL CREATININE-BSD FRML MDRD: >90 ML/MIN/{1.73_M2}
GLUCOSE SERPL-MCNC: 97 MG/DL (ref 70–99)
HCT VFR BLD AUTO: 26.4 % (ref 35–47)
HCT VFR BLD AUTO: 28.5 % (ref 35–47)
HGB BLD-MCNC: 8.7 G/DL (ref 11.7–15.7)
HGB BLD-MCNC: 9.3 G/DL (ref 11.7–15.7)
IMM GRANULOCYTES # BLD: 0.1 10E9/L (ref 0–0.4)
IMM GRANULOCYTES # BLD: 0.1 10E9/L (ref 0–0.4)
IMM GRANULOCYTES NFR BLD: 0.3 %
IMM GRANULOCYTES NFR BLD: 0.4 %
LACTATE BLD-SCNC: 1.2 MMOL/L (ref 0.7–2)
LYMPHOCYTES # BLD AUTO: 2.3 10E9/L (ref 0.8–5.3)
LYMPHOCYTES # BLD AUTO: 3.5 10E9/L (ref 0.8–5.3)
LYMPHOCYTES NFR BLD AUTO: 20.5 %
LYMPHOCYTES NFR BLD AUTO: 24 %
MCH RBC QN AUTO: 29.2 PG (ref 26.5–33)
MCH RBC QN AUTO: 29.6 PG (ref 26.5–33)
MCHC RBC AUTO-ENTMCNC: 32.6 G/DL (ref 31.5–36.5)
MCHC RBC AUTO-ENTMCNC: 33 G/DL (ref 31.5–36.5)
MCV RBC AUTO: 90 FL (ref 78–100)
MCV RBC AUTO: 90 FL (ref 78–100)
MONOCYTES # BLD AUTO: 0.8 10E9/L (ref 0–1.3)
MONOCYTES # BLD AUTO: 1 10E9/L (ref 0–1.3)
MONOCYTES NFR BLD AUTO: 6.8 %
MONOCYTES NFR BLD AUTO: 7.3 %
NEUTROPHILS # BLD AUTO: 6.4 10E9/L (ref 1.6–8.3)
NEUTROPHILS # BLD AUTO: 8.2 10E9/L (ref 1.6–8.3)
NEUTROPHILS NFR BLD AUTO: 56.9 %
NEUTROPHILS NFR BLD AUTO: 57.6 %
NRBC # BLD AUTO: 0.1 10*3/UL
NRBC # BLD AUTO: 0.1 10*3/UL
NRBC BLD AUTO-RTO: 1 /100
NRBC BLD AUTO-RTO: 1 /100
PLATELET # BLD AUTO: 288 10E9/L (ref 150–450)
PLATELET # BLD AUTO: 346 10E9/L (ref 150–450)
POTASSIUM SERPL-SCNC: 4.1 MMOL/L (ref 3.4–5.3)
PROT SERPL-MCNC: 7.4 G/DL (ref 6.8–8.8)
RBC # BLD AUTO: 2.94 10E12/L (ref 3.8–5.2)
RBC # BLD AUTO: 3.18 10E12/L (ref 3.8–5.2)
RETICS # AUTO: 337.1 10E9/L (ref 25–95)
RETICS # AUTO: NORMAL 10E9/L (ref 25–95)
RETICS/RBC NFR AUTO: 10.6 % (ref 0.5–2)
RETICS/RBC NFR AUTO: NORMAL % (ref 0.5–2)
SODIUM SERPL-SCNC: 137 MMOL/L (ref 133–144)
WBC # BLD AUTO: 11.2 10E9/L (ref 4–11)
WBC # BLD AUTO: 14.5 10E9/L (ref 4–11)

## 2021-05-25 PROCEDURE — 250N000013 HC RX MED GY IP 250 OP 250 PS 637: Performed by: EMERGENCY MEDICINE

## 2021-05-25 PROCEDURE — 96374 THER/PROPH/DIAG INJ IV PUSH: CPT | Performed by: EMERGENCY MEDICINE

## 2021-05-25 PROCEDURE — 96375 TX/PRO/DX INJ NEW DRUG ADDON: CPT | Performed by: EMERGENCY MEDICINE

## 2021-05-25 PROCEDURE — 99285 EMERGENCY DEPT VISIT HI MDM: CPT | Mod: 25,27 | Performed by: EMERGENCY MEDICINE

## 2021-05-25 PROCEDURE — 96376 TX/PRO/DX INJ SAME DRUG ADON: CPT | Performed by: EMERGENCY MEDICINE

## 2021-05-25 PROCEDURE — 250N000011 HC RX IP 250 OP 636: Performed by: EMERGENCY MEDICINE

## 2021-05-25 PROCEDURE — 96361 HYDRATE IV INFUSION ADD-ON: CPT | Performed by: EMERGENCY MEDICINE

## 2021-05-25 PROCEDURE — 83605 ASSAY OF LACTIC ACID: CPT | Performed by: EMERGENCY MEDICINE

## 2021-05-25 PROCEDURE — 80053 COMPREHEN METABOLIC PANEL: CPT | Performed by: EMERGENCY MEDICINE

## 2021-05-25 PROCEDURE — 99285 EMERGENCY DEPT VISIT HI MDM: CPT | Performed by: EMERGENCY MEDICINE

## 2021-05-25 PROCEDURE — 250N000009 HC RX 250: Performed by: EMERGENCY MEDICINE

## 2021-05-25 PROCEDURE — 85025 COMPLETE CBC W/AUTO DIFF WBC: CPT | Performed by: EMERGENCY MEDICINE

## 2021-05-25 PROCEDURE — 258N000003 HC RX IP 258 OP 636: Performed by: EMERGENCY MEDICINE

## 2021-05-25 RX ORDER — ONDANSETRON 2 MG/ML
8 INJECTION INTRAMUSCULAR; INTRAVENOUS
Status: COMPLETED | OUTPATIENT
Start: 2021-05-25 | End: 2021-05-25

## 2021-05-25 RX ORDER — MORPHINE SULFATE 2 MG/ML
2 INJECTION, SOLUTION INTRAMUSCULAR; INTRAVENOUS ONCE
Status: COMPLETED | OUTPATIENT
Start: 2021-05-25 | End: 2021-05-25

## 2021-05-25 RX ORDER — MORPHINE SULFATE 4 MG/ML
2 INJECTION, SOLUTION INTRAMUSCULAR; INTRAVENOUS
Status: COMPLETED | OUTPATIENT
Start: 2021-05-25 | End: 2021-05-25

## 2021-05-25 RX ORDER — SODIUM CHLORIDE, SODIUM LACTATE, POTASSIUM CHLORIDE, CALCIUM CHLORIDE 600; 310; 30; 20 MG/100ML; MG/100ML; MG/100ML; MG/100ML
1000 INJECTION, SOLUTION INTRAVENOUS CONTINUOUS
Status: DISCONTINUED | OUTPATIENT
Start: 2021-05-25 | End: 2021-05-25 | Stop reason: HOSPADM

## 2021-05-25 RX ORDER — HEPARIN SODIUM,PORCINE 10 UNIT/ML
5-10 VIAL (ML) INTRAVENOUS
Status: DISCONTINUED | OUTPATIENT
Start: 2021-05-25 | End: 2021-05-25 | Stop reason: HOSPADM

## 2021-05-25 RX ORDER — HEPARIN SODIUM (PORCINE) LOCK FLUSH IV SOLN 100 UNIT/ML 100 UNIT/ML
5 SOLUTION INTRAVENOUS
Status: DISCONTINUED | OUTPATIENT
Start: 2021-05-25 | End: 2021-05-25 | Stop reason: HOSPADM

## 2021-05-25 RX ORDER — LIDOCAINE 40 MG/G
CREAM TOPICAL
Status: DISCONTINUED | OUTPATIENT
Start: 2021-05-25 | End: 2021-05-25 | Stop reason: HOSPADM

## 2021-05-25 RX ORDER — DIPHENHYDRAMINE HCL 25 MG
25 CAPSULE ORAL
Status: COMPLETED | OUTPATIENT
Start: 2021-05-25 | End: 2021-05-25

## 2021-05-25 RX ADMIN — MORPHINE SULFATE 2 MG: 4 INJECTION INTRAVENOUS at 19:04

## 2021-05-25 RX ADMIN — Medication 5 ML: at 02:27

## 2021-05-25 RX ADMIN — LIDOCAINE: 40 CREAM TOPICAL at 14:59

## 2021-05-25 RX ADMIN — ONDANSETRON 8 MG: 2 INJECTION INTRAMUSCULAR; INTRAVENOUS at 15:34

## 2021-05-25 RX ADMIN — HEPARIN, PORCINE (PF) 10 UNIT/ML INTRAVENOUS SYRINGE 5 ML: at 00:37

## 2021-05-25 RX ADMIN — MORPHINE SULFATE 2 MG: 4 INJECTION INTRAVENOUS at 15:34

## 2021-05-25 RX ADMIN — MORPHINE SULFATE 2 MG: 2 INJECTION, SOLUTION INTRAMUSCULAR; INTRAVENOUS at 00:36

## 2021-05-25 RX ADMIN — MORPHINE SULFATE 2 MG: 2 INJECTION, SOLUTION INTRAMUSCULAR; INTRAVENOUS at 01:45

## 2021-05-25 RX ADMIN — DIPHENHYDRAMINE HYDROCHLORIDE 25 MG: 25 CAPSULE ORAL at 15:34

## 2021-05-25 RX ADMIN — MORPHINE SULFATE 2 MG: 4 INJECTION INTRAVENOUS at 16:35

## 2021-05-25 RX ADMIN — Medication 5 ML: at 19:29

## 2021-05-25 RX ADMIN — SODIUM CHLORIDE, POTASSIUM CHLORIDE, SODIUM LACTATE AND CALCIUM CHLORIDE 1000 ML: 600; 310; 30; 20 INJECTION, SOLUTION INTRAVENOUS at 15:33

## 2021-05-25 ASSESSMENT — MIFFLIN-ST. JEOR: SCORE: 1493.44

## 2021-05-25 NOTE — ED PROVIDER NOTES
ED Provider Note  [facility]      History     Chief Complaint   Patient presents with     Sickle Cell Pain Crisis     The history is provided by the patient and medical records.     Jennifer Cervantes is a 21 year old female with a medical history significant for sickle cell disease, PE, cognitive delays and right upper extremity hemiparesis secondary to CVA, iron overload secondary to chronic transfusions and asthma who presents to the Emergency Department for evaluation of a sickle cell pain crisis that started today around 2-3 PM.  Patient reports that her pain started in her back, which she states is typical for her pain crises, and has since spread diffusely throughout her back and into all extremities.  Patient denies any chest pain or abdominal pain.  She does report that she has some nausea currently as well as a migraine.  She denies any episodes of vomiting.  Patient states that Tylenol and Zofran typically help with her migraines.  She denies any recent head traumas or injuries.  Patient denies any recent fevers, chills, congestion or urinary symptoms, but she does note a recent runny nose.  She denies any positive sick contacts.  The patient reports that she takes oxycodone 15 mg every 6 hours and OxyContin 10 mg twice daily for her sickle cell pain crises.  Patient reports that she last took her oxycodone just prior to arrival.  Patient reports that she attempted to get into the infusion center today, but there was not any openings and she did not feel she was going to be able to make it until tomorrow.  Patient presents to the Emergency Department now for further evaluation and management.  Of note, patient reports that she has not yet gotten her COVID-19 vaccine.    Per review of patient's chart, patient was seen in the Emergency Department here on 5/22/2021 for back pain that was typical for her sickle cell flares.  At that time, patient's pain was treated per her care plan.   Patient felt improved and was discharged with instructions to follow-up with her hematologist in clinic.    Past Medical History  Past Medical History:   Diagnosis Date     Anemia      Anxiety      Bleeding disorder (H)      Blood clotting disorder (H)      Cerebral infarction (H) 2015     Cognitive developmental delay     low IQ. 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 (H)     right hand contractures     Iron overload due to repeated red blood cell transfusions      Migraines      Multiple subsegmental pulmonary emboli without acute cor pulmonale (H) 02/01/2021     Oppositional defiant behavior      Superficial venous thrombosis of arm, right 03/25/2021     Uncomplicated asthma      Past Surgical History:   Procedure Laterality Date     AS INSERT TUNNELED CV 2 CATH W/O PORT/PUMP       C BREAST REDUCTION (INCLUDES LIPO) TIER 3 Bilateral 04/23/2019     CHOLECYSTECTOMY       INSERT PORT VASCULAR ACCESS Left 4/21/2021    Procedure: INSERTION, VASCULAR ACCESS PORT (NOT SURE ON SIDE UNTIL REMOVAL);  Surgeon: Rajan More MD;  Location: [facility] OR     IR CHEST PORT PLACEMENT > 5 YRS OF AGE  4/21/2021     IR CVC NON TUNNEL LINE REMOVAL  5/6/2021     IR CVC NON TUNNEL PLACEMENT  04/07/2020     IR PORT REMOVAL LEFT  4/21/2021     REMOVE PORT VASCULAR ACCESS Left 4/21/2021    Procedure: REMOVAL, VASCULAR ACCESS PORT LEFT;  Surgeon: Rajan More MD;  Location: [facility] OR     REPAIR TENDON ELBOW Right 10/02/2019    Procedure: Right Elbow Flexor Lengthening, Flexor Pronator Slide Of Wrist and Finger, Thumb Adductor Lengthening;  Surgeon: Anai Franco MD;  Location: [facility] OR     TONSILLECTOMY Bilateral 10/02/2019    Procedure: Bilateral Tonsillectomy;  Surgeon: Farhana Guy MD;  Location: [facility] OR     acetaminophen (TYLENOL) 325 MG tablet  albuterol (PROAIR HFA/PROVENTIL  HFA/VENTOLIN HFA) 108 (90 Base) MCG/ACT inhaler  albuterol (PROVENTIL) (2.5 MG/3ML) 0.083% neb solution  ARIPiprazole (ABILIFY) 2 MG tablet  budesonide-formoterol (SYMBICORT) 160-4.5 MCG/ACT Inhaler  celecoxib (CELEBREX) 100 MG capsule  diclofenac (VOLTAREN) 1 % topical gel  diphenhydrAMINE (BENADRYL) 25 MG capsule  EPINEPHrine (ANY BX GENERIC EQUIV) 0.3 MG/0.3ML injection 2-pack  Hydroxyurea 1000 MG TABS  hydrOXYzine (ATARAX) 25 MG tablet  JADENU 360 MG tablet  lidocaine-prilocaine (EMLA) 2.5-2.5 % external cream  medroxyPROGESTERone (DEPO-PROVERA) 150 MG/ML IM injection  naloxone (NARCAN) 4 MG/0.1ML nasal spray  ondansetron (ZOFRAN) 8 MG tablet  oxyCODONE (OXYCONTIN) 10 MG 12 hr tablet  oxyCODONE IR (ROXICODONE) 15 MG tablet  [START ON 5/29/2021] rivaroxaban ANTICOAGULANT (XARELTO ANTICOAGULANT) 20 MG TABS tablet  rivaroxaban ANTICOAGULANT (XARELTO) 15 MG TABS tablet  sertraline (ZOLOFT) 100 MG tablet      Allergies   Allergen Reactions     Contrast Dye      Hives and breathing issues     Fish-Derived Products Hives     Seafood Hives     Diagnostic X-Ray Materials      Gadolinium      Family History  Family History   Problem Relation Age of Onset     Sickle Cell Trait Mother      Hypertension Mother      Asthma Mother      Sickle Cell Trait Father      Social History   Social History     Tobacco Use     Smoking status: Never Smoker     Smokeless tobacco: Never Used   Substance Use Topics     Alcohol use: Not Currently     Alcohol/week: 0.0 standard drinks     Drug use: Never      Past medical history, past surgical history, medications, allergies, family history, and social history were reviewed with the patient. No additional pertinent items.       Review of Systems   Constitutional: Negative for chills and fever.   HENT: Positive for rhinorrhea. Negative for congestion and sore throat.    Respiratory: Negative for cough and shortness of breath.    Cardiovascular: Negative for chest pain, palpitations and leg  "swelling.   Gastrointestinal: Negative for abdominal pain, diarrhea and nausea.   Genitourinary: Negative for difficulty urinating, dysuria, flank pain and frequency.   Musculoskeletal: Positive for arthralgias, back pain and myalgias. Negative for neck stiffness.   Skin: Negative for color change.   Neurological: Positive for headaches.   Psychiatric/Behavioral: Negative for confusion.   All other systems reviewed and are negative.      Physical Exam   BP: 127/86  Pulse: 119  Temp: 98.1  F (36.7  C)  Resp: 16  Height: 162.6 cm (5' 4\")  Weight: 72.6 kg (160 lb)  SpO2: 96 %  Physical Exam  Vitals signs and nursing note reviewed.   Constitutional:       General: She is not in acute distress.     Appearance: She is well-developed. She is not diaphoretic.      Comments: Adult female, appears to be in acute distress, uncomfortable   HENT:      Head: Normocephalic and atraumatic.   Eyes:      Pupils: Pupils are equal, round, and reactive to light.   Cardiovascular:      Rate and Rhythm: Normal rate and regular rhythm.      Heart sounds: Normal heart sounds.   Pulmonary:      Effort: Pulmonary effort is normal. No respiratory distress.      Breath sounds: Normal breath sounds. No wheezing or rales.   Abdominal:      General: Bowel sounds are normal. There is no distension.      Palpations: Abdomen is soft.      Tenderness: There is no abdominal tenderness. There is no guarding.   Skin:     General: Skin is warm and dry.   Neurological:      Mental Status: She is alert and oriented to person, place, and time.      GCS: GCS eye subscore is 4. GCS verbal subscore is 5. GCS motor subscore is 6.      Cranial Nerves: Cranial nerves are intact.      Sensory: Sensation is intact.      Coordination: Coordination is intact.      Comments: Baseline RUE paresis         ED Course      Procedures     11:01 PM  The patient was seen and examined by Leslie Roper MD  in Room 5.           The medical record was reviewed and " interpreted.  Current labs reviewed and interpreted.  Previous labs reviewed and interpreted.       Results for orders placed or performed during the hospital encounter of 05/24/21   CBC with platelets differential     Status: Abnormal   Result Value Ref Range    WBC 14.5 (H) 4.0 - 11.0 10e9/L    RBC Count 3.18 (L) 3.8 - 5.2 10e12/L    Hemoglobin 9.3 (L) 11.7 - 15.7 g/dL    Hematocrit 28.5 (L) 35.0 - 47.0 %    MCV 90 78 - 100 fl    MCH 29.2 26.5 - 33.0 pg    MCHC 32.6 31.5 - 36.5 g/dL    RDW 20.4 (H) 10.0 - 15.0 %    Platelet Count 346 150 - 450 10e9/L    Diff Method Automated Method     % Neutrophils 56.9 %    % Lymphocytes 24.0 %    % Monocytes 6.8 %    % Eosinophils 9.9 %    % Basophils 2.1 %    % Immature Granulocytes 0.3 %    Nucleated RBCs 1 (H) 0 /100    Absolute Neutrophil 8.2 1.6 - 8.3 10e9/L    Absolute Lymphocytes 3.5 0.8 - 5.3 10e9/L    Absolute Monocytes 1.0 0.0 - 1.3 10e9/L    Absolute Eosinophils 1.4 (H) 0.0 - 0.7 10e9/L    Absolute Basophils 0.3 (H) 0.0 - 0.2 10e9/L    Abs Immature Granulocytes 0.1 0 - 0.4 10e9/L    Absolute Nucleated RBC 0.1    Comprehensive metabolic panel     Status: Abnormal   Result Value Ref Range    Sodium 136 133 - 144 mmol/L    Potassium 3.5 3.4 - 5.3 mmol/L    Chloride 105 94 - 109 mmol/L    Carbon Dioxide 25 20 - 32 mmol/L    Anion Gap 7 3 - 14 mmol/L    Glucose 84 70 - 99 mg/dL    Urea Nitrogen 7 7 - 30 mg/dL    Creatinine 0.52 0.52 - 1.04 mg/dL    GFR Estimate >90 >60 mL/min/[1.73_m2]    GFR Estimate If Black >90 >60 mL/min/[1.73_m2]    Calcium 9.2 8.5 - 10.1 mg/dL    Bilirubin Total 2.0 (H) 0.2 - 1.3 mg/dL    Albumin 4.0 3.4 - 5.0 g/dL    Protein Total 8.8 6.8 - 8.8 g/dL    Alkaline Phosphatase 92 40 - 150 U/L     (H) 0 - 50 U/L     (H) 0 - 45 U/L   Reticulocyte count     Status: Abnormal   Result Value Ref Range    % Retic 10.6 (H) 0.5 - 2.0 %    Absolute Retic 337.1 (H) 25 - 95 10e9/L     Medications   heparin lock flush 10 UNIT/ML injection 5-10 mL (5  mLs Intracatheter Given 5/25/21 0037)   heparin 100 UNIT/ML injection 5 mL (has no administration in time range)   morphine (PF) injection 2 mg (2 mg Intravenous Given 5/24/21 2315)   acetaminophen (TYLENOL) tablet 650 mg (650 mg Oral Given 5/24/21 2315)   ondansetron (ZOFRAN) injection 4 mg (4 mg Intravenous Given 5/24/21 2315)   0.9% sodium chloride BOLUS (0 mLs Intravenous Stopped 5/25/21 0038)   morphine (PF) injection 2 mg (2 mg Intravenous Given 5/25/21 0036)        Assessments & Plan (with Medical Decision Making)   Patient presents to the emergency department today complaining of back pain which has been going on for most of the day.  This is fairly consistent with prior sickle cell flares.  She is denying any fevers or infectious type symptoms.  She does have fairly severe sickle cell disease at baseline, having had a CVA in the past which resulted in a right upper extremity paresis.    We did establish IV access me to draw blood for laboratory analysis.  CBC shows a hemoglobin of 9.3, she does have a leukocytosis with a white count of 14.5, it is noteworthy that she frequently has leukocytosis. No fever or focal infectious symptoms.  Platelets are within normal limits. CMP demonstrates normal creatinine and normal electrolytes.  Bilirubin is 2.0 (up from 1.5 2 days ago) and transaminases are elevated with an ALT of 188 and an AST of 167, reticulocyte count is 10.6%.  Patient was given IV fluids and we did follow her protocol here in the ED.  Patient was given morphine 2 mg IV x 3 doses per her protocol, Zofran 4 mg IV x1, Tylenol 650 mg p.o. x1.    Upon recheck she is feeling better.  Plan will be to discharge home as she is feeling better and would prefer to go home and follow up with her clinic.  Return precautions discussed.  Patient in agreement with plan.     I have reviewed the nursing notes. I have reviewed the findings, diagnosis, plan and need for follow up with the patient.    New Prescriptions     No medications on file       Final diagnoses:   Sickle cell pain crisis (H)       --  I, Isaac Walton, am serving as a trained medical scribe to document services personally performed by Leslie Roper MD, based on the provider's statements to me.     ILeslie MD, was physically present and have reviewed and verified the accuracy of this note documented by Isaac Walton.    Leslie Roper MD  [facility] EMERGENCY DEPARTMENT  5/24/2021     Leslie Roper MD  05/25/21 0102

## 2021-05-25 NOTE — ED TRIAGE NOTES
Pt presents ambulatory to triage with c/o sickle cell pain that is all over but is mostly in lower back. Pt was treated in ED yesterday and had relief of symptoms, but pain has returned this morning around 1000. Took home medications and has only had minimal relief. Pt spoke to triage nurse at infusion clinic who told patient that they were full and that she should go back to the ED.  Arely Lee RN on 5/25/2021 at 2:03 PM

## 2021-05-25 NOTE — TELEPHONE ENCOUNTER
"[facility] Triage Note    Patient called triage requesting IVF pain meds for all over her body pain rated 10/10 x past few hours .   Was in ED last night and \"pain is so bad\"  There is yelling and arguing in the back ground and she voiced that would be best to go to ED and she will go there now.     Kelsey Santiago RN   BSN, [facility], [facility]  [facility] Triage        "

## 2021-05-25 NOTE — ED PROVIDER NOTES
History     Chief Complaint   Patient presents with     Sickle Cell Pain Crisis     HPI  Jennifer Cervantes is a 21 year old female with a past medical history of sickle cell disease, anxiety, hemochromatosis, migraines, CVA, PE who presents to the emergency department with a chief complaint of sickle cell pain crisis. Located all over, but is worst in her lower back. She was seen in the ER yesterday and discharged home after her symptoms improved. However, today her pain returned around 10:00. Tried her prescribed home medications with only minimal relief. Pt tried to go to the infusion clinic today but there were no openings so she was directed back to the ER.  The patient notes she was told to avoid NSAIDs as she is still taking blood thinners.  The patient has an appointment with her hematologist tomorrow that she is hoping she will be able to make it to.    I have reviewed the Medications, Allergies, Past Medical and Surgical History, and Social History in the [employer] system.    Past Medical History:   Diagnosis Date     Anemia      Anxiety      Bleeding disorder (H)      Blood clotting disorder (H)      Cerebral infarction (H) 2015     Cognitive developmental delay     low IQ. 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 (H)     right hand contractures     Iron overload due to repeated red blood cell transfusions      Migraines      Multiple subsegmental pulmonary emboli without acute cor pulmonale (H) 02/01/2021     Oppositional defiant behavior      Superficial venous thrombosis of arm, right 03/25/2021     Uncomplicated asthma      Past Surgical History:   Procedure Laterality Date     AS INSERT TUNNELED CV 2 CATH W/O PORT/PUMP       C BREAST REDUCTION (INCLUDES LIPO) TIER 3 Bilateral 04/23/2019     CHOLECYSTECTOMY       INSERT PORT VASCULAR ACCESS Left 4/21/2021    Procedure:  INSERTION, VASCULAR ACCESS PORT (NOT SURE ON SIDE UNTIL REMOVAL);  Surgeon: Rajan More MD;  Location: [facility] OR     IR CHEST PORT PLACEMENT > 5 YRS OF AGE  4/21/2021     IR CVC NON TUNNEL LINE REMOVAL  5/6/2021     IR CVC NON TUNNEL PLACEMENT  04/07/2020     IR PORT REMOVAL LEFT  4/21/2021     REMOVE PORT VASCULAR ACCESS Left 4/21/2021    Procedure: REMOVAL, VASCULAR ACCESS PORT LEFT;  Surgeon: Rajan More MD;  Location: [facility] OR     REPAIR TENDON ELBOW Right 10/02/2019    Procedure: Right Elbow Flexor Lengthening, Flexor Pronator Slide Of Wrist and Finger, Thumb Adductor Lengthening;  Surgeon: Anai Franco MD;  Location: [facility] OR     TONSILLECTOMY Bilateral 10/02/2019    Procedure: Bilateral Tonsillectomy;  Surgeon: Farhana Guy MD;  Location: [facility] OR     Current Facility-Administered Medications   Medication     heparin 100 UNIT/ML injection 5 mL     lidocaine (LMX4) cream     Current Outpatient Medications   Medication     acetaminophen (TYLENOL) 325 MG tablet     albuterol (PROAIR HFA/PROVENTIL HFA/VENTOLIN HFA) 108 (90 Base) MCG/ACT inhaler     albuterol (PROVENTIL) (2.5 MG/3ML) 0.083% neb solution     ARIPiprazole (ABILIFY) 2 MG tablet     budesonide-formoterol (SYMBICORT) 160-4.5 MCG/ACT Inhaler     celecoxib (CELEBREX) 100 MG capsule     diclofenac (VOLTAREN) 1 % topical gel     diphenhydrAMINE (BENADRYL) 25 MG capsule     EPINEPHrine (ANY BX GENERIC EQUIV) 0.3 MG/0.3ML injection 2-pack     Hydroxyurea 1000 MG TABS     hydrOXYzine (ATARAX) 25 MG tablet     JADENU 360 MG tablet     lidocaine-prilocaine (EMLA) 2.5-2.5 % external cream     medroxyPROGESTERone (DEPO-PROVERA) 150 MG/ML IM injection     naloxone (NARCAN) 4 MG/0.1ML nasal spray     ondansetron (ZOFRAN) 8 MG tablet     oxyCODONE (OXYCONTIN) 10 MG 12 hr tablet     oxyCODONE IR (ROXICODONE) 15 MG tablet     [START ON 5/29/2021] rivaroxaban ANTICOAGULANT (XARELTO ANTICOAGULANT) 20 MG TABS tablet     rivaroxaban ANTICOAGULANT  "(XARELTO) 15 MG TABS tablet     sertraline (ZOLOFT) 100 MG tablet     Allergies   Allergen Reactions     Contrast Dye      Hives and breathing issues     Fish-Derived Products Hives     Seafood Hives     Diagnostic X-Ray Materials      Gadolinium      Past medical history, past surgical history, medications, and allergies were reviewed with the patient. Additional pertinent items: None    Social History     Socioeconomic History     Marital status: Single     Spouse name: Not on file     Number of children: Not on file     Years of education: Not on file     Highest education level: Not on file   Occupational History     Not on file   Social Needs     Financial resource strain: Not on file     Food insecurity     Worry: Not on file     Inability: Not on file     Transportation needs     Medical: Not on file     Non-medical: Not on file   Tobacco Use     Smoking status: Never Smoker     Smokeless tobacco: Never Used   Substance and Sexual Activity     Alcohol use: Not Currently     Alcohol/week: 0.0 standard drinks     Drug use: Never     Sexual activity: Not Currently     Partners: Male     Birth control/protection: Other   Lifestyle     Physical activity     Days per week: Not on file     Minutes per session: Not on file     Stress: Not on file   Relationships     Social connections     Talks on phone: Not on file     Gets together: Not on file     Attends Synagogue service: Not on file     Active member of club or organization: Not on file     Attends meetings of clubs or organizations: Not on file     Relationship status: Not on file     Intimate partner violence     Fear of current or ex partner: Not on file     Emotionally abused: Not on file     Physically abused: Not on file     Forced sexual activity: Not on file   Other Topics Concern     Parent/sibling w/ CABG, MI or angioplasty before 65F 55M? Not Asked   Social History Narrative    Lives with mom and extended family but \"toxic environment\" per her " "report. She would like to move out but cannot financially do so. She has minimal support at home despite her significant SCD comorbidities and cognitive delay from stroke.     Social history was reviewed with the patient. Additional pertinent items: None    Review of Systems  General: No fevers or chills  Skin: No rash or diaphoresis  Eyes: No eye redness or discharge  Ears/Nose/Throat: No rhinorrhea or nasal congestion  Respiratory: No cough or SOB  Cardiovascular: No chest pain or palpitations  Gastrointestinal: No nausea, vomiting, or diarrhea  Genitourinary: No urinary frequency, hematuria, or dysuria  Musculoskeletal: See HPI  Neurologic: No numbness or weakness  Psychiatric: No depression or SI  Hematologic/Lymphatic/Immunologic: No leg swelling, positive for anticoagulation  Endocrine: No polyuria/polydypsia    A complete review of systems was performed with pertinent positives and negatives noted in the HPI, and all other systems negative.    Physical Exam   BP: (!) 151/86  Pulse: 125  Temp: 97.1  F (36.2  C)  Resp: 18  Height: 162.6 cm (5' 4\")  Weight: 74.8 kg (165 lb)  SpO2: 94 %      General: Well nourished, well developed, NAD  HEENT: EOMI, anicteric. NCAT, MMM  Neck: no jugular venous distension, supple, nl ROM  Cardiac: Regular rate and rhythm. No murmurs, rubs, or gallops. Normal S1, S2.  Intact peripheral pulses  Pulm: CTAB, no stridor, wheezes, rales, rhonchi  Back: TTP over BL and midline lower back, no CVA TTP  Skin: Warm and dry to the touch.  No rash  Extremities: No LE edema, no cyanosis, w/w/p  Neuro: A&Ox3, no gross focal deficits    ED Course        Procedures                           Labs Ordered and Resulted from Time of ED Arrival Up to the Time of Departure from the ED   COMPREHENSIVE METABOLIC PANEL - Abnormal; Notable for the following components:       Result Value    Bilirubin Total 1.7 (*)      (*)      (*)     All other components within normal limits   CBC WITH " PLATELETS DIFFERENTIAL - Abnormal; Notable for the following components:    WBC 11.2 (*)     RBC Count 2.94 (*)     Hemoglobin 8.7 (*)     Hematocrit 26.4 (*)     RDW 20.5 (*)     Nucleated RBCs 1 (*)     Absolute Eosinophils 1.4 (*)     All other components within normal limits   LACTATE FOR SEPSIS PROTOCOL   RETICULOCYTE COUNT   RETICULOCYTE COUNT            Results for orders placed or performed during the hospital encounter of 05/25/21 (from the past 24 hour(s))   Comprehensive metabolic panel   Result Value Ref Range    Sodium 137 133 - 144 mmol/L    Potassium 4.1 3.4 - 5.3 mmol/L    Chloride 106 94 - 109 mmol/L    Carbon Dioxide 26 20 - 32 mmol/L    Anion Gap 6 3 - 14 mmol/L    Glucose 97 70 - 99 mg/dL    Urea Nitrogen 9 7 - 30 mg/dL    Creatinine 0.56 0.52 - 1.04 mg/dL    GFR Estimate >90 >60 mL/min/[1.73_m2]    GFR Estimate If Black >90 >60 mL/min/[1.73_m2]    Calcium 8.7 8.5 - 10.1 mg/dL    Bilirubin Total 1.7 (H) 0.2 - 1.3 mg/dL    Albumin 3.5 3.4 - 5.0 g/dL    Protein Total 7.4 6.8 - 8.8 g/dL    Alkaline Phosphatase 84 40 - 150 U/L     (H) 0 - 50 U/L     (H) 0 - 45 U/L   CBC with platelets differential   Result Value Ref Range    WBC 11.2 (H) 4.0 - 11.0 10e9/L    RBC Count 2.94 (L) 3.8 - 5.2 10e12/L    Hemoglobin 8.7 (L) 11.7 - 15.7 g/dL    Hematocrit 26.4 (L) 35.0 - 47.0 %    MCV 90 78 - 100 fl    MCH 29.6 26.5 - 33.0 pg    MCHC 33.0 31.5 - 36.5 g/dL    RDW 20.5 (H) 10.0 - 15.0 %    Platelet Count 288 150 - 450 10e9/L    Diff Method Automated Method     % Neutrophils 57.6 %    % Lymphocytes 20.5 %    % Monocytes 7.3 %    % Eosinophils 12.3 %    % Basophils 1.9 %    % Immature Granulocytes 0.4 %    Nucleated RBCs 1 (H) 0 /100    Absolute Neutrophil 6.4 1.6 - 8.3 10e9/L    Absolute Lymphocytes 2.3 0.8 - 5.3 10e9/L    Absolute Monocytes 0.8 0.0 - 1.3 10e9/L    Absolute Eosinophils 1.4 (H) 0.0 - 0.7 10e9/L    Absolute Basophils 0.2 0.0 - 0.2 10e9/L    Abs Immature Granulocytes 0.1 0 - 0.4  10e9/L    Absolute Nucleated RBC 0.1    Lactate for Sepsis Protocol   Result Value Ref Range    Lactate for Sepsis Protocol 1.2 0.7 - 2.0 mmol/L   Reticulocyte count   Result Value Ref Range    % Retic Canceled, Test credited 0.5 - 2.0 %    Absolute Retic Canceled, Test credited 25 - 95 10e9/L       Labs, vital signs, and imaging studies were reviewed by me.    Medications   lidocaine (LMX4) cream ( Topical Given 5/25/21 1459)   heparin 100 UNIT/ML injection 5 mL (5 mLs Intracatheter Given 5/25/21 1929)   lactated ringers infusion (1,000 mLs Intravenous Not Given 5/25/21 1910)   morphine (PF) injection 2 mg (2 mg Intravenous Given 5/25/21 1904)   lactated ringers BOLUS 1,000 mL (0 mLs Intravenous Stopped 5/25/21 1633)   ondansetron (ZOFRAN) injection 8 mg (8 mg Intravenous Given 5/25/21 1534)   diphenhydrAMINE (BENADRYL) capsule 25 mg (25 mg Oral Given 5/25/21 1534)       Assessments & Plan (with Medical Decision Making)   Jennifer Cervantes is a 21 year old female who presents with sickle cell pain crisis, similar to prior sickle cell pain. Labs ordered to further evaluate the patient. Medications for symptomatic relief were ordered for the patient per her ER care plan.     Labs are remarkable for white blood cell count 11.2, hemoglobin 8.7.  Lactate is within normal limits at 1.2.    After medications were given in the ER, the patient feels better and would like to be discharged home    I have reviewed the nursing notes.    I have reviewed the findings, diagnosis, plan and need for follow up with the patient.    Patient to be discharged home. Advised to follow up with PCP within 1 week and hematology as scheduled. To return to ER immediately with any new/worsening symptoms. Plan of care discussed with patient who expresses understanding and agrees with plan of care.    New Prescriptions    No medications on file       Final diagnoses:   Sickle cell pain crisis (H)     Lis Hall MD  5/25/2021   [facility]  [facility] EMERGENCY DEPARTMENT     Lis Hall MD  05/25/21 1940

## 2021-05-25 NOTE — DISCHARGE INSTRUCTIONS
You have been seen in the emergency department today for your sickle cell pain.  You have reported that after treatment here in the ER you are feeling better.  We recommend that you call your hematology clinic tomorrow and let them know that you are having problems with sickle cell disease and talk with them about what your next steps should be.  Come back to the emergency department if you have fevers or other new symptoms.

## 2021-05-26 ENCOUNTER — HOSPITAL ENCOUNTER (EMERGENCY)
Facility: CLINIC | Age: 21
Discharge: HOME | End: 2021-05-27
Attending: EMERGENCY MEDICINE | Admitting: EMERGENCY MEDICINE
Payer: COMMERCIAL

## 2021-05-26 ENCOUNTER — PATIENT OUTREACH (OUTPATIENT)
Dept: ONCOLOGY | Facility: CLINIC | Age: 21
End: 2021-05-26

## 2021-05-26 ENCOUNTER — APPOINTMENT (OUTPATIENT)
Dept: LAB | Facility: CLINIC | Age: 21
End: 2021-05-26
Attending: PHYSICIAN ASSISTANT
Payer: COMMERCIAL

## 2021-05-26 ENCOUNTER — TELEPHONE (OUTPATIENT)
Dept: ONCOLOGY | Facility: CLINIC | Age: 21
End: 2021-05-26

## 2021-05-26 ENCOUNTER — APPOINTMENT (OUTPATIENT)
Dept: ULTRASOUND IMAGING | Facility: CLINIC | Age: 21
End: 2021-05-26
Attending: EMERGENCY MEDICINE
Payer: COMMERCIAL

## 2021-05-26 ENCOUNTER — ONCOLOGY VISIT (OUTPATIENT)
Dept: ONCOLOGY | Facility: CLINIC | Age: 21
End: 2021-05-26
Attending: PHYSICIAN ASSISTANT
Payer: COMMERCIAL

## 2021-05-26 ENCOUNTER — OFFICE VISIT (OUTPATIENT)
Dept: INTERNAL MEDICINE | Facility: CLINIC | Age: 21
End: 2021-05-26
Payer: COMMERCIAL

## 2021-05-26 VITALS
TEMPERATURE: 98.3 F | HEART RATE: 115 BPM | BODY MASS INDEX: 28.92 KG/M2 | SYSTOLIC BLOOD PRESSURE: 127 MMHG | DIASTOLIC BLOOD PRESSURE: 88 MMHG | OXYGEN SATURATION: 96 % | WEIGHT: 168.5 LBS | RESPIRATION RATE: 16 BRPM

## 2021-05-26 VITALS — HEART RATE: 105 BPM | OXYGEN SATURATION: 96 % | DIASTOLIC BLOOD PRESSURE: 83 MMHG | SYSTOLIC BLOOD PRESSURE: 128 MMHG

## 2021-05-26 DIAGNOSIS — Z30.09 UNWANTED FERTILITY: Primary | ICD-10-CM

## 2021-05-26 DIAGNOSIS — M79.89 SWELLING OF LIMB: ICD-10-CM

## 2021-05-26 DIAGNOSIS — M79.89 LEFT LEG SWELLING: ICD-10-CM

## 2021-05-26 DIAGNOSIS — R87.610 ATYPICAL SQUAMOUS CELLS OF UNDETERMINED SIGNIFICANCE ON CYTOLOGIC SMEAR OF CERVIX (ASC-US): ICD-10-CM

## 2021-05-26 DIAGNOSIS — D57.1 HB-SS DISEASE WITHOUT CRISIS (H): Primary | ICD-10-CM

## 2021-05-26 DIAGNOSIS — G43.909 MIGRAINE WITHOUT STATUS MIGRAINOSUS, NOT INTRACTABLE, UNSPECIFIED MIGRAINE TYPE: ICD-10-CM

## 2021-05-26 DIAGNOSIS — D57.00 SICKLE CELL PAIN CRISIS (H): ICD-10-CM

## 2021-05-26 DIAGNOSIS — D57.00 HB-SS DISEASE WITH CRISIS (H): ICD-10-CM

## 2021-05-26 LAB
ALBUMIN SERPL-MCNC: 3.6 G/DL (ref 3.4–5)
ALBUMIN SERPL-MCNC: 3.6 G/DL (ref 3.4–5)
ALP SERPL-CCNC: 85 U/L (ref 40–150)
ALP SERPL-CCNC: 86 U/L (ref 40–150)
ALT SERPL W P-5'-P-CCNC: 161 U/L (ref 0–50)
ALT SERPL W P-5'-P-CCNC: 165 U/L (ref 0–50)
ANION GAP SERPL CALCULATED.3IONS-SCNC: 5 MMOL/L (ref 3–14)
ANION GAP SERPL CALCULATED.3IONS-SCNC: 6 MMOL/L (ref 3–14)
AST SERPL W P-5'-P-CCNC: 135 U/L (ref 0–45)
AST SERPL W P-5'-P-CCNC: 147 U/L (ref 0–45)
BASOPHILS # BLD AUTO: 0.2 10E9/L (ref 0–0.2)
BASOPHILS # BLD AUTO: 0.2 10E9/L (ref 0–0.2)
BASOPHILS NFR BLD AUTO: 1.9 %
BASOPHILS NFR BLD AUTO: 2 %
BILIRUB SERPL-MCNC: 2.2 MG/DL (ref 0.2–1.3)
BILIRUB SERPL-MCNC: 2.3 MG/DL (ref 0.2–1.3)
BUN SERPL-MCNC: 10 MG/DL (ref 7–30)
BUN SERPL-MCNC: 12 MG/DL (ref 7–30)
CALCIUM SERPL-MCNC: 8.9 MG/DL (ref 8.5–10.1)
CALCIUM SERPL-MCNC: 9.1 MG/DL (ref 8.5–10.1)
CHLORIDE SERPL-SCNC: 106 MMOL/L (ref 94–109)
CHLORIDE SERPL-SCNC: 108 MMOL/L (ref 94–109)
CO2 SERPL-SCNC: 25 MMOL/L (ref 20–32)
CO2 SERPL-SCNC: 27 MMOL/L (ref 20–32)
CREAT SERPL-MCNC: 0.49 MG/DL (ref 0.52–1.04)
CREAT SERPL-MCNC: 0.53 MG/DL (ref 0.52–1.04)
DIFFERENTIAL METHOD BLD: ABNORMAL
DIFFERENTIAL METHOD BLD: ABNORMAL
EOSINOPHIL # BLD AUTO: 1.2 10E9/L (ref 0–0.7)
EOSINOPHIL # BLD AUTO: 1.5 10E9/L (ref 0–0.7)
EOSINOPHIL NFR BLD AUTO: 11.4 %
EOSINOPHIL NFR BLD AUTO: 13.2 %
ERYTHROCYTE [DISTWIDTH] IN BLOOD BY AUTOMATED COUNT: 20.2 % (ref 10–15)
ERYTHROCYTE [DISTWIDTH] IN BLOOD BY AUTOMATED COUNT: 20.4 % (ref 10–15)
FERRITIN SERPL-MCNC: 4586 NG/ML (ref 12–150)
GFR SERPL CREATININE-BSD FRML MDRD: >90 ML/MIN/{1.73_M2}
GFR SERPL CREATININE-BSD FRML MDRD: >90 ML/MIN/{1.73_M2}
GLUCOSE SERPL-MCNC: 100 MG/DL (ref 70–99)
GLUCOSE SERPL-MCNC: 112 MG/DL (ref 70–99)
HCT VFR BLD AUTO: 25.4 % (ref 35–47)
HCT VFR BLD AUTO: 26.7 % (ref 35–47)
HGB BLD-MCNC: 8.3 G/DL (ref 11.7–15.7)
HGB BLD-MCNC: 8.7 G/DL (ref 11.7–15.7)
IMM GRANULOCYTES # BLD: 0 10E9/L (ref 0–0.4)
IMM GRANULOCYTES # BLD: 0 10E9/L (ref 0–0.4)
IMM GRANULOCYTES NFR BLD: 0.3 %
IMM GRANULOCYTES NFR BLD: 0.4 %
LYMPHOCYTES # BLD AUTO: 1.8 10E9/L (ref 0.8–5.3)
LYMPHOCYTES # BLD AUTO: 3 10E9/L (ref 0.8–5.3)
LYMPHOCYTES NFR BLD AUTO: 17 %
LYMPHOCYTES NFR BLD AUTO: 26.1 %
MCH RBC QN AUTO: 29.4 PG (ref 26.5–33)
MCH RBC QN AUTO: 29.4 PG (ref 26.5–33)
MCHC RBC AUTO-ENTMCNC: 32.6 G/DL (ref 31.5–36.5)
MCHC RBC AUTO-ENTMCNC: 32.7 G/DL (ref 31.5–36.5)
MCV RBC AUTO: 90 FL (ref 78–100)
MCV RBC AUTO: 90 FL (ref 78–100)
MONOCYTES # BLD AUTO: 0.7 10E9/L (ref 0–1.3)
MONOCYTES # BLD AUTO: 0.8 10E9/L (ref 0–1.3)
MONOCYTES NFR BLD AUTO: 6.3 %
MONOCYTES NFR BLD AUTO: 6.8 %
NEUTROPHILS # BLD AUTO: 6 10E9/L (ref 1.6–8.3)
NEUTROPHILS # BLD AUTO: 6.5 10E9/L (ref 1.6–8.3)
NEUTROPHILS NFR BLD AUTO: 51.6 %
NEUTROPHILS NFR BLD AUTO: 63 %
NRBC # BLD AUTO: 0.1 10*3/UL
NRBC # BLD AUTO: 0.1 10*3/UL
NRBC BLD AUTO-RTO: 1 /100
NRBC BLD AUTO-RTO: 1 /100
PLATELET # BLD AUTO: 242 10E9/L (ref 150–450)
PLATELET # BLD AUTO: 257 10E9/L (ref 150–450)
POTASSIUM SERPL-SCNC: 3.7 MMOL/L (ref 3.4–5.3)
POTASSIUM SERPL-SCNC: 4 MMOL/L (ref 3.4–5.3)
PROT SERPL-MCNC: 7.7 G/DL (ref 6.8–8.8)
PROT SERPL-MCNC: 7.8 G/DL (ref 6.8–8.8)
RBC # BLD AUTO: 2.82 10E12/L (ref 3.8–5.2)
RBC # BLD AUTO: 2.96 10E12/L (ref 3.8–5.2)
RETICS # AUTO: 306.1 10E9/L (ref 25–95)
RETICS/RBC NFR AUTO: 10.5 % (ref 0.5–2)
SODIUM SERPL-SCNC: 137 MMOL/L (ref 133–144)
SODIUM SERPL-SCNC: 140 MMOL/L (ref 133–144)
WBC # BLD AUTO: 10.4 10E9/L (ref 4–11)
WBC # BLD AUTO: 11.6 10E9/L (ref 4–11)

## 2021-05-26 PROCEDURE — 96375 TX/PRO/DX INJ NEW DRUG ADDON: CPT

## 2021-05-26 PROCEDURE — 96374 THER/PROPH/DIAG INJ IV PUSH: CPT

## 2021-05-26 PROCEDURE — 99285 EMERGENCY DEPT VISIT HI MDM: CPT | Performed by: EMERGENCY MEDICINE

## 2021-05-26 PROCEDURE — 93971 EXTREMITY STUDY: CPT | Mod: LT

## 2021-05-26 PROCEDURE — 99215 OFFICE O/P EST HI 40 MIN: CPT | Mod: 25 | Performed by: PEDIATRICS

## 2021-05-26 PROCEDURE — 99285 EMERGENCY DEPT VISIT HI MDM: CPT | Mod: 25

## 2021-05-26 PROCEDURE — 99417 PROLNG OP E/M EACH 15 MIN: CPT | Performed by: PEDIATRICS

## 2021-05-26 PROCEDURE — 96376 TX/PRO/DX INJ SAME DRUG ADON: CPT

## 2021-05-26 PROCEDURE — 36591 DRAW BLOOD OFF VENOUS DEVICE: CPT

## 2021-05-26 PROCEDURE — 85025 COMPLETE CBC W/AUTO DIFF WBC: CPT | Performed by: PHYSICIAN ASSISTANT

## 2021-05-26 PROCEDURE — 258N000003 HC RX IP 258 OP 636: Performed by: EMERGENCY MEDICINE

## 2021-05-26 PROCEDURE — 250N000011 HC RX IP 250 OP 636: Performed by: EMERGENCY MEDICINE

## 2021-05-26 PROCEDURE — 80053 COMPREHEN METABOLIC PANEL: CPT | Performed by: EMERGENCY MEDICINE

## 2021-05-26 PROCEDURE — 85025 COMPLETE CBC W/AUTO DIFF WBC: CPT | Performed by: EMERGENCY MEDICINE

## 2021-05-26 PROCEDURE — 99215 OFFICE O/P EST HI 40 MIN: CPT | Performed by: PHYSICIAN ASSISTANT

## 2021-05-26 PROCEDURE — 80053 COMPREHEN METABOLIC PANEL: CPT | Mod: 59 | Performed by: PHYSICIAN ASSISTANT

## 2021-05-26 PROCEDURE — 96372 THER/PROPH/DIAG INJ SC/IM: CPT | Performed by: PEDIATRICS

## 2021-05-26 PROCEDURE — 250N000011 HC RX IP 250 OP 636: Performed by: PHYSICIAN ASSISTANT

## 2021-05-26 PROCEDURE — 93971 EXTREMITY STUDY: CPT | Mod: 26 | Performed by: RADIOLOGY

## 2021-05-26 PROCEDURE — G0463 HOSPITAL OUTPT CLINIC VISIT: HCPCS

## 2021-05-26 PROCEDURE — 85045 AUTOMATED RETICULOCYTE COUNT: CPT | Performed by: PHYSICIAN ASSISTANT

## 2021-05-26 PROCEDURE — 82728 ASSAY OF FERRITIN: CPT | Performed by: PHYSICIAN ASSISTANT

## 2021-05-26 RX ORDER — MORPHINE SULFATE 4 MG/ML
2 INJECTION, SOLUTION INTRAMUSCULAR; INTRAVENOUS
Status: COMPLETED | OUTPATIENT
Start: 2021-05-26 | End: 2021-05-26

## 2021-05-26 RX ORDER — ONDANSETRON 2 MG/ML
8 INJECTION INTRAMUSCULAR; INTRAVENOUS
Status: DISCONTINUED | OUTPATIENT
Start: 2021-05-26 | End: 2021-05-27 | Stop reason: HOSPADM

## 2021-05-26 RX ORDER — DIPHENHYDRAMINE HCL 25 MG
25 CAPSULE ORAL
Status: DISCONTINUED | OUTPATIENT
Start: 2021-05-26 | End: 2021-05-27 | Stop reason: HOSPADM

## 2021-05-26 RX ORDER — HEPARIN SODIUM (PORCINE) LOCK FLUSH IV SOLN 100 UNIT/ML 100 UNIT/ML
5 SOLUTION INTRAVENOUS ONCE
Status: COMPLETED | OUTPATIENT
Start: 2021-05-26 | End: 2021-05-26

## 2021-05-26 RX ORDER — MEDROXYPROGESTERONE ACETATE 150 MG/ML
150 INJECTION, SUSPENSION INTRAMUSCULAR
Status: COMPLETED | OUTPATIENT
Start: 2021-05-26 | End: 2022-02-28

## 2021-05-26 RX ADMIN — MORPHINE SULFATE 2 MG: 4 INJECTION INTRAVENOUS at 19:49

## 2021-05-26 RX ADMIN — ALTEPLASE 2 MG: 2.2 INJECTION, POWDER, LYOPHILIZED, FOR SOLUTION INTRAVENOUS at 20:06

## 2021-05-26 RX ADMIN — SODIUM CHLORIDE, POTASSIUM CHLORIDE, SODIUM LACTATE AND CALCIUM CHLORIDE 1000 ML: 600; 310; 30; 20 INJECTION, SOLUTION INTRAVENOUS at 19:52

## 2021-05-26 RX ADMIN — Medication 5 ML: at 11:29

## 2021-05-26 RX ADMIN — MEDROXYPROGESTERONE ACETATE 150 MG: 150 INJECTION, SUSPENSION INTRAMUSCULAR at 14:00

## 2021-05-26 RX ADMIN — MORPHINE SULFATE 2 MG: 4 INJECTION INTRAVENOUS at 20:57

## 2021-05-26 RX ADMIN — MORPHINE SULFATE 2 MG: 4 INJECTION INTRAVENOUS at 22:28

## 2021-05-26 ASSESSMENT — PAIN SCALES - GENERAL: PAINLEVEL: EXTREME PAIN (8)

## 2021-05-26 NOTE — DISCHARGE INSTRUCTIONS
TODAY'S VISIT:  You were seen today for sickle cell pain   -   - If you had any labs or imaging/radiology tests performed today, you should also discuss these tests with your usual provider.     FOLLOW-UP:  Please make an appointment to follow up with:  - Your Primary Care Provider. If you do not have a PCP, please call the Primary Care Center (phone: [phone] for an appointment  -  your hematologist    - Have your provider review the results from today's visit with you again to make sure no further follow-up or additional testing is needed based on those results.     RETURN TO THE EMERGENCY DEPARTMENT  Return to the Emergency Department at any time for any new or worsening symptoms or any concerns.

## 2021-05-26 NOTE — LETTER
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      She was admitted 2/1/21-2/3/21 with a new PE, started on Rivaroxaban. Switched to Eliquis 3/25/21 with RUE DVT.     She was admitted 4/26/21-5/11/21 with sickle cell pain crisis complicated by worsening PE in setting of low Apixaban levels, acute hypoxic respiratory failure, pneumonia, acute chest syndrome s/p exchange transfusion on 4/30 and 5/4. After 2nd exchange her oxygen requirement dramatically improved from 20L to 1-2L 5/5 and she was off oxygen as of 5/6.      She was seen today for hematology follow-up.     Interval History:  Jennifer was seen today for follow-up. She continues to deal with significant pain in her low back and sides. She has been trying to manage with her home pain meds but notes that she still is uncomfortable and thinks she needs an infusion. She has had multiple ED visits since last week for this pain. She has had a few migraines on the right, typical for her, has long history of migraines, no stroke symptoms. She does have nausea with her migraines. She notes improvement in anxiety with Hydroxyzine. Depression OK, continues to try to find her own place and is staying at hotels intermittently, at home now.    Denies fevers, dizziness, chest pain, SOB, cough, abdominal pain, bowel or urinary concerns. Does note swelling in her left leg. Is taking Xarelto BID and will  transition to once daily dosing later this week.     Current Outpatient Medications   Medication Sig Dispense Refill     acetaminophen (TYLENOL) 325 MG tablet Take 2 tablets (650 mg) by mouth every 6 hours as needed for mild pain 120 tablet 3     albuterol (PROAIR HFA/PROVENTIL HFA/VENTOLIN HFA) 108 (90 Base) MCG/ACT inhaler Inhale 2 puffs into the lungs every 6 hours as needed for shortness of breath / dyspnea or wheezing 8.5 g 3     albuterol (PROVENTIL) (2.5 MG/3ML) 0.083% neb solution Take 1 vial (2.5 mg) by nebulization every 6 hours as needed for shortness of breath / dyspnea or wheezing 12 mL 4     ARIPiprazole (ABILIFY) 2 MG tablet Take 1 tablet (2 mg) by mouth daily 30 tablet 3     budesonide-formoterol (SYMBICORT) 160-4.5 MCG/ACT Inhaler Inhale 1 puff into the lungs every evening 10.2 g 3     celecoxib (CELEBREX) 100 MG capsule Take 1 capsule (100 mg) by mouth 2 times daily 60 capsule 3     diclofenac (VOLTAREN) 1 % topical gel Apply 4 g topically 4 times daily as needed for moderate pain Apply to back, legs, and arms for pain 150 g 3     diphenhydrAMINE (BENADRYL) 25 MG capsule Take 1-2 capsules (25-50 mg) by mouth nightly as needed for sleep 60 capsule 3     EPINEPHrine (ANY BX GENERIC EQUIV) 0.3 MG/0.3ML injection 2-pack Inject 0.3 mLs (0.3 mg) into the muscle as needed for anaphylaxis 1 each 1     Hydroxyurea 1000 MG TABS Take 2,000 mg by mouth daily 60 tablet 3     hydrOXYzine (ATARAX) 25 MG tablet Take 1 tablet (25 mg) by mouth 3 times daily as needed for anxiety 30 tablet 1     JADENU 360 MG tablet Take 4 tablets (1,440 mg) by mouth every evening 30 tablet 0     lidocaine-prilocaine (EMLA) 2.5-2.5 % external cream Apply topically as needed for moderate pain 30 g 1     medroxyPROGESTERone (DEPO-PROVERA) 150 MG/ML IM injection Inject 150 mg into the muscle       naloxone (NARCAN) 4 MG/0.1ML nasal spray Spray 1 spray (4 mg) into one nostril alternating nostrils once as needed for opioid reversal every  2-3 minutes until assistance arrives 0.2 mL 1     ondansetron (ZOFRAN) 8 MG tablet        oxyCODONE (OXYCONTIN) 10 MG 12 hr tablet Take 1 tablet (10 mg) by mouth every 12 hours 60 tablet 0     oxyCODONE IR (ROXICODONE) 15 MG tablet Take 1 tablet (15mg) by mouth every 4-6 hours as needed for severe pain. Goal 4 per day. Max 6 per day. 60 tablet 0     [START ON 5/29/2021] rivaroxaban ANTICOAGULANT (XARELTO ANTICOAGULANT) 20 MG TABS tablet Take 1 tablet (20 mg) by mouth daily (with dinner) Starting on 5/29 after completing your 15mg twice daily dosing on 5/28 30 tablet 2     rivaroxaban ANTICOAGULANT (XARELTO) 15 MG TABS tablet Take 1 tablet (15 mg) by mouth 2 times daily (with meals) for 17 days 34 tablet 0     sertraline (ZOLOFT) 100 MG tablet Take 2 tablets (200 mg) by mouth daily 180 tablet 3     Physical Examination:  /88 (BP Location: Right arm, Patient Position: Sitting, Cuff Size: Adult Regular)   Pulse 115   Temp 98.3  F (36.8  C) (Oral)   Resp 16   Wt 76.4 kg (168 lb 8 oz)   SpO2 96%   BMI 28.92 kg/m    Wt Readings from Last 10 Encounters:   05/24/21 72.6 kg (160 lb)   05/22/21 73.9 kg (163 lb)   05/19/21 74.1 kg (163 lb 6.4 oz)   05/10/21 72.2 kg (159 lb 2.8 oz)   04/26/21 75.8 kg (167 lb 1.6 oz)   04/25/21 77.6 kg (171 lb)   04/24/21 77.9 kg (171 lb 11.2 oz)   04/22/21 76.2 kg (168 lb)   04/21/21 76.3 kg (168 lb 3.4 oz)   04/16/21 76.3 kg (168 lb 3.4 oz)     Constitutional: Well-appearing female in no acute distress.  Eyes: EOMI, PERRL. No scleral icterus.  ENT: Oral mucosa is moist without lesions or thrush.   Lymphatic: Neck is supple without cervical or supraclavicular lymphadenopathy.   Cardiovascular: Regular rate and rhythm. No murmurs, gallops, or rubs. Left leg 1+ peripheral edema.  Respiratory: Clear to auscultation bilaterally. No wheezes or crackles.  Gastrointestinal: Bowel sounds present. Abdomen soft, non-tender.  Neurologic: Sequela of prior right sided stroke.   Skin: No rashes,  petechiae, or bruising noted on exposed skin. Port in left chest without erythema, still slightly tender.     Laboratory Data:  Results for HIMANSHU AL (MRN [id number]) as of 5/26/2021 16:35   5/26/2021 11:37   Sodium 137   Potassium 4.0   Chloride 106   Carbon Dioxide 25   Urea Nitrogen 12   Creatinine 0.53   GFR Estimate >90   GFR Estimate If Black >90   Calcium 8.9   Anion Gap 6   Albumin 3.6   Protein Total 7.8   Bilirubin Total 2.3 (H)   Alkaline Phosphatase 86    (H)    (H)   Ferritin 4,586 (H)   Glucose 100 (H)   WBC 10.4   Hemoglobin 8.7 (L)   Hematocrit 26.7 (L)   Platelet Count 257   RBC Count 2.96 (L)   MCV 90   MCH 29.4   MCHC 32.6   RDW 20.2 (H)   Diff Method Automated Method   % Neutrophils 63.0   % Lymphocytes 17.0   % Monocytes 6.3   % Eosinophils 11.4   % Basophils 1.9   % Immature Granulocytes 0.4   Nucleated RBCs 1 (H)   Absolute Neutrophil 6.5   Absolute Lymphocytes 1.8   Absolute Monocytes 0.7   Absolute Eosinophils 1.2 (H)   Absolute Basophils 0.2   Abs Immature Granulocytes 0.0   Absolute Nucleated RBC 0.1       Assessment and Plan:    1. Sickle Cell Disease  HgbSS complicated by hx stroke, acute chest, iron overload, chronic pain and recently complicated by prolonged hospitalization for complicated pain crisis with worsening PE, acute chest, possible pneumonia, acute hypoxic respiratory failure. Did require exchange x 2 inpatient. Clinically appears well today though she has had multiple ED visits for pain despite no clear trigger or concerning findings on exam.      Labs: Hgb stable at 8.7. Plt and WBC WNL. ALT/AST elevated but improved, suspect 2/2 iron deposition. Confirmed <3000mg Tylenol daily. Ferritin remains elevated but improved. Remainder stable.      Meds: Stopped Voxeletor. Continue Hydrea 2000mg daily. Contineu Jadenu 4 tablets daily. Now on Desferral over 48 hours every other weekend-this weekend will get 2nd dose. We discussed Jr and she is interested in  this-will discuss with Dr. Duncan.     Pain Control: Continue OxyContin 10mg BID. Continue Oxycodone to 15mg PRN. Continue Tylenol PRN, and Celebrex PRN. OK to use Voltaren gel as needed. Avoid escalation of PO meds at this time.      Port still slightly tender but improving.      Follow-up: Has follow up with Dr. Duncan 6/4.     2. Neuro  History of stroke. ASA on hold while on anticoagulation for PE.      PT referral for right leg weakness-scheduled next week.     Migraines, chronic issue, more recently. Push fluids and monitor, could send to headache clinic if needed. She understands going to ED with any headache with other neuro concern given stroke history.      3. Psych  History of anxiety and depression. Continue Sertraline 200mg daily and Abilify 2mg daily and Hydroxyzine PRN. Overall mood stable. She did say that if her depression is worse she tends to shut out others and not talk and if this happens we should still continue trying to reach out to her. Denies thoughts of self harm.      Follows with outside therapist every week.      OK to use Benadryl PRN insomnia.      4. Pulm  History of asthma, continue Symbicort and Albuterol PRN. Has pulm visit scheduled.       Bilateral PE diagnosed 2/1/21 was on Xarelto. RUE DVT 3/25/21 switched to Eliquis. Worsening PE diagnosed 4/27/21, heme consult inpatient, Eliquis levels were low, switched back to Xarelto and levels were WNL. Now on 15mg BID until 5/29, then switch to 20mg daily. Has worsening left leg swelling today-will get LLE US to r/o DVT.    Acute chest/hypoxic respiratory failure/pneumonia: Issues inpatient, received abx and exchange x 2 with improvement in O2. Discharged without oxygen. Denies any breathing concerns or fevers. Sating 100% on RA.     5. Other  OK for Pfiezer or Moderna COVID vaccine, gave her number to schedule.     Did not discuss sickle cell health maintenance today.     Unable to get patient in to infusion today due to limited  availability. She called later stating she was going to ED-called to give report. Asked if they could get LLE US in ED to r/o DVT.    60 minutes spent on the date of the encounter doing chart review, review of test results, interpretation of tests, patient visit, documentation and discussion with other provider(s)     Andrei Machado PA-C  [facility]  [address] [place] [id number] [phone]          RONALDO Allan

## 2021-05-26 NOTE — NURSING NOTE
Chief Complaint   Patient presents with     Recheck Medication     follow up and DEPO shot     Kimberly Nissen, EMT at 1:10 PM on 5/26/2021

## 2021-05-26 NOTE — PROGRESS NOTES
Frequent crises.   Respect issues - she feels like she will be deferred behind other people in the ER.    She gets fluids in the ED. She tries to get hydrated.    She has tried gabapentin for a while, and was taken off but she doesn't remember why.    The powerport is , only there for 2 months.  The current one is her 19th port (the last one was a pediatric size).     She has more trouble with winter/cold - she has more trouble with hydration.     She said she denied the BMT offer - based on pros and cons.      Migraines.   Right-sided starts at eye, goes to side of face, associated with nausea and vomiting. She feels woozy and dizzy, no visual stuff. Includes photophobia. Frequency increasing lately - daily last 3 days,   She takes acetaminophen for this.  She doesn't recall sevearl other medicines I mentioned    Stroke 1 yo.    In 2017 she had a ministroke, after she was taken off blood transfusions. She was put to sleep in the ICU for several days.   Now     She has elevated ferritin from her repeated transfusions, and now only gets transfusions when she really needs it.  She is on desferal every 2 weeks     Gastritis developed when she wasn't eating right. She used to       Depression, seeing an outside psychologist, who used to work       She has a  for disability/housing. She has had difficulty connecting sometimes because of frequent crises.      Outstanding issues (Dr. Case)  --------------------------------------------  -- allergies  -- ASCUS   -- Housing

## 2021-05-26 NOTE — NURSING NOTE
Chief Complaint   Patient presents with     Port Draw     Labs drawn via port by rn in lab. VS taken.     Port accessed with 20g 3/4 inch gripper needle by RN, labs collected, line flushed with saline and heparin.  Vitals taken. Pt checked in for appointment(s).    Stan Siddiqui, RN

## 2021-05-26 NOTE — PROGRESS NOTES
Writer placed call to Jennifer to see how she is doing. She has been having increased pain management needs and has not been able to get into infusion much as it has had no openings. She will go to ED today and plans on calling in am for infusion. Message to Dr Duncan to see if ok to add her due to full infusion this week and needing ED. Has been continuing to take Jadenu orally, getting next infusion for chelation tomorrow. Is currently at her moms place of residence since discharging but will likely be leaving soon. Will call later to get her COVID vaccine set up, advised she can also do through [employer].

## 2021-05-26 NOTE — TELEPHONE ENCOUNTER
Patient called into triage stating that she just had an appointment with Andrei Machado today. She stated that she was having 10/10 sickle cell pain and was unable to be seen in infusion today. Patient was questioning how busy the ED currently was, as she was headed there now. Patient had stated that her pain rating was 10/10 for a few days. She had taken oxycodone, Celebrex, and tylenol without relief. RN called [facility] to give report.

## 2021-05-26 NOTE — ED PROVIDER NOTES
History     Chief Complaint   Patient presents with     Sickle Cell Pain Crisis     HPI  Jennifer Cervantes is a 21 year old female with a past medical history of sickle cell disease, anxiety, hemochromatosis, migraines, CVA, PE (on anticoagulation) who presents to the emergency department with a chief complaint of sickle cell pain crisis. Located in her lower back and her sides. Similar  To prior flares. She was seen in the ER yesterday as well as the day before and discharged home after her symptoms improved. However, today her pain is again 10/10 in severity. Tried her prescribed home medications (oxycodone, celebrex, tylenol) with only minimal relief. Pt tried to go to the infusion clinic today but there were no openings so she was directed back to the ER.  The patient notes she was told to avoid NSAIDs as she is still taking blood thinners.  This pain is not different from her usual flares.     The pt stats she has noticed her L leg is slightly swollen, no pain. Her doctor recommended she get an US.    I have reviewed the Medications, Allergies, Past Medical and Surgical History, and Social History in the [facility] system.    Past Medical History:   Diagnosis Date     Anxiety      Bleeding disorder (H)      Blood clotting disorder (H)      Cerebral infarction (H) 2015     Cognitive developmental delay     low IQ. 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 (H)     right hand contractures     Iron overload due to repeated red blood cell transfusions      Migraines      Multiple subsegmental pulmonary emboli without acute cor pulmonale (H) 02/01/2021     Oppositional defiant behavior      Superficial venous thrombosis of arm, right 03/25/2021     Uncomplicated asthma      Past Surgical History:   Procedure Laterality Date     AS INSERT TUNNELED CV 2 CATH W/O PORT/PUMP       C BREAST REDUCTION  (INCLUDES LIPO) TIER 3 Bilateral 04/23/2019     CHOLECYSTECTOMY       INSERT PORT VASCULAR ACCESS Left 4/21/2021    Procedure: INSERTION, VASCULAR ACCESS PORT (NOT SURE ON SIDE UNTIL REMOVAL);  Surgeon: Rajan More MD;  Location: [facility] OR     IR CHEST PORT PLACEMENT > 5 YRS OF AGE  4/21/2021     IR CVC NON TUNNEL LINE REMOVAL  5/6/2021     IR CVC NON TUNNEL PLACEMENT  04/07/2020     IR PORT REMOVAL LEFT  4/21/2021     REMOVE PORT VASCULAR ACCESS Left 4/21/2021    Procedure: REMOVAL, VASCULAR ACCESS PORT LEFT;  Surgeon: Rajan More MD;  Location: [facility] OR     REPAIR TENDON ELBOW Right 10/02/2019    Procedure: Right Elbow Flexor Lengthening, Flexor Pronator Slide Of Wrist and Finger, Thumb Adductor Lengthening;  Surgeon: Anai Franco MD;  Location: [facility] OR     TONSILLECTOMY Bilateral 10/02/2019    Procedure: Bilateral Tonsillectomy;  Surgeon: Farhana Guy MD;  Location: [facility] OR     Current Facility-Administered Medications   Medication     diphenhydrAMINE (BENADRYL) capsule 25 mg     lactated ringers BOLUS 1,000 mL     medroxyPROGESTERone (DEPO-PROVERA) syringe 150 mg     morphine (PF) injection 2 mg     ondansetron (ZOFRAN) injection 8 mg     Current Outpatient Medications   Medication     acetaminophen (TYLENOL) 325 MG tablet     albuterol (PROAIR HFA/PROVENTIL HFA/VENTOLIN HFA) 108 (90 Base) MCG/ACT inhaler     albuterol (PROVENTIL) (2.5 MG/3ML) 0.083% neb solution     ARIPiprazole (ABILIFY) 2 MG tablet     budesonide-formoterol (SYMBICORT) 160-4.5 MCG/ACT Inhaler     celecoxib (CELEBREX) 100 MG capsule     diclofenac (VOLTAREN) 1 % topical gel     diphenhydrAMINE (BENADRYL) 25 MG capsule     EPINEPHrine (ANY BX GENERIC EQUIV) 0.3 MG/0.3ML injection 2-pack     Hydroxyurea 1000 MG TABS     hydrOXYzine (ATARAX) 25 MG tablet     JADENU 360 MG tablet     lidocaine-prilocaine (EMLA) 2.5-2.5 % external cream     medroxyPROGESTERone (DEPO-PROVERA) 150 MG/ML IM injection     naloxone (NARCAN) 4  MG/0.1ML nasal spray     ondansetron (ZOFRAN) 8 MG tablet     oxyCODONE (OXYCONTIN) 10 MG 12 hr tablet     oxyCODONE IR (ROXICODONE) 15 MG tablet     [START ON 5/29/2021] rivaroxaban ANTICOAGULANT (XARELTO ANTICOAGULANT) 20 MG TABS tablet     rivaroxaban ANTICOAGULANT (XARELTO) 15 MG TABS tablet     sertraline (ZOLOFT) 100 MG tablet     Allergies   Allergen Reactions     Contrast Dye      Hives and breathing issues     Fish-Derived Products Hives     Seafood Hives     Diagnostic X-Ray Materials      Gadolinium      Past medical history, past surgical history, medications, and allergies were reviewed with the patient. Additional pertinent items: None    Social History     Socioeconomic History     Marital status: Single     Spouse name: Not on file     Number of children: Not on file     Years of education: Not on file     Highest education level: Not on file   Occupational History     Not on file   Social Needs     Financial resource strain: Not on file     Food insecurity     Worry: Not on file     Inability: Not on file     Transportation needs     Medical: Not on file     Non-medical: Not on file   Tobacco Use     Smoking status: Never Smoker     Smokeless tobacco: Never Used   Substance and Sexual Activity     Alcohol use: Not Currently     Alcohol/week: 0.0 standard drinks     Drug use: Never     Sexual activity: Not Currently     Partners: Male     Birth control/protection: Other   Lifestyle     Physical activity     Days per week: Not on file     Minutes per session: Not on file     Stress: Not on file   Relationships     Social connections     Talks on phone: Not on file     Gets together: Not on file     Attends Confucianist service: Not on file     Active member of club or organization: Not on file     Attends meetings of clubs or organizations: Not on file     Relationship status: Not on file     Intimate partner violence     Fear of current or ex partner: Not on file     Emotionally abused: Not on file      "Physically abused: Not on file     Forced sexual activity: Not on file   Other Topics Concern     Parent/sibling w/ CABG, MI or angioplasty before 65F 55M? Not Asked   Social History Narrative    Lives with mom and extended family but \"toxic environment\" per her report. She would like to move out but cannot financially do so. She has minimal support at home despite her significant SCD comorbidities and cognitive delay from stroke.     Social history was reviewed with the patient. Additional pertinent items: None    Review of Systems  General: No fevers or chills  Skin: No rash or diaphoresis  Eyes: No eye redness or discharge  Ears/Nose/Throat: No rhinorrhea or nasal congestion  Respiratory: No cough or SOB  Cardiovascular: No chest pain or palpitations  Gastrointestinal: No nausea, vomiting, or diarrhea  Genitourinary: No urinary frequency, hematuria, or dysuria  Musculoskeletal: See HPI  Neurologic: No numbness or weakness  Hematologic/Lymphatic/Immunologic: positive for left leg swelling, positive for anticoagulation   Endocrine: No polyuria/polydypsia    A complete review of systems was performed with pertinent positives and negatives noted in the HPI, and all other systems negative.    Physical Exam   BP: 131/89  Temp: 98.3  F (36.8  C)  Resp: 16  Weight: 76.2 kg (168 lb)  SpO2: 95 %      General: Well nourished, well developed, NAD  HEENT: EOMI, anicteric. NCAT, MMM  Neck: no jugular venous distension, supple, nl ROM  Cardiac: Regular rate and rhythm.  Intact peripheral pulses  Pulm: Airway patent, NLB  Skin: Warm and dry to the touch.  No rash  Extremities: No LE edema, no cyanosis, w/w/p, no posterior calf TTP  Neuro: A&Ox3, no gross focal deficits    ED Course        Procedures                           Labs Ordered and Resulted from Time of ED Arrival Up to the Time of Departure from the ED   CBC WITH PLATELETS DIFFERENTIAL - Abnormal; Notable for the following components:       Result Value    WBC 11.6 " (*)     RBC Count 2.82 (*)     Hemoglobin 8.3 (*)     Hematocrit 25.4 (*)     RDW 20.4 (*)     Nucleated RBCs 1 (*)     Absolute Eosinophils 1.5 (*)     All other components within normal limits   COMPREHENSIVE METABOLIC PANEL - Abnormal; Notable for the following components:    Glucose 112 (*)     Creatinine 0.49 (*)     Bilirubin Total 2.2 (*)      (*)      (*)     All other components within normal limits            Results for orders placed or performed in visit on 05/26/21 (from the past 24 hour(s))   Ferritin   Result Value Ref Range    Ferritin 4,586 (H) 12 - 150 ng/mL   Comprehensive metabolic panel   Result Value Ref Range    Sodium 137 133 - 144 mmol/L    Potassium 4.0 3.4 - 5.3 mmol/L    Chloride 106 94 - 109 mmol/L    Carbon Dioxide 25 20 - 32 mmol/L    Anion Gap 6 3 - 14 mmol/L    Glucose 100 (H) 70 - 99 mg/dL    Urea Nitrogen 12 7 - 30 mg/dL    Creatinine 0.53 0.52 - 1.04 mg/dL    GFR Estimate >90 >60 mL/min/[1.73_m2]    GFR Estimate If Black >90 >60 mL/min/[1.73_m2]    Calcium 8.9 8.5 - 10.1 mg/dL    Bilirubin Total 2.3 (H) 0.2 - 1.3 mg/dL    Albumin 3.6 3.4 - 5.0 g/dL    Protein Total 7.8 6.8 - 8.8 g/dL    Alkaline Phosphatase 86 40 - 150 U/L     (H) 0 - 50 U/L     (H) 0 - 45 U/L   *CBC with platelets differential   Result Value Ref Range    WBC 10.4 4.0 - 11.0 10e9/L    RBC Count 2.96 (L) 3.8 - 5.2 10e12/L    Hemoglobin 8.7 (L) 11.7 - 15.7 g/dL    Hematocrit 26.7 (L) 35.0 - 47.0 %    MCV 90 78 - 100 fl    MCH 29.4 26.5 - 33.0 pg    MCHC 32.6 31.5 - 36.5 g/dL    RDW 20.2 (H) 10.0 - 15.0 %    Platelet Count 257 150 - 450 10e9/L    Diff Method Automated Method     % Neutrophils 63.0 %    % Lymphocytes 17.0 %    % Monocytes 6.3 %    % Eosinophils 11.4 %    % Basophils 1.9 %    % Immature Granulocytes 0.4 %    Nucleated RBCs 1 (H) 0 /100    Absolute Neutrophil 6.5 1.6 - 8.3 10e9/L    Absolute Lymphocytes 1.8 0.8 - 5.3 10e9/L    Absolute Monocytes 0.7 0.0 - 1.3 10e9/L     Absolute Eosinophils 1.2 (H) 0.0 - 0.7 10e9/L    Absolute Basophils 0.2 0.0 - 0.2 10e9/L    Abs Immature Granulocytes 0.0 0 - 0.4 10e9/L    Absolute Nucleated RBC 0.1        Labs, vital signs, and imaging studies were reviewed by me.    Medications   morphine (PF) injection 2 mg (has no administration in time range)   ondansetron (ZOFRAN) injection 8 mg (has no administration in time range)   diphenhydrAMINE (BENADRYL) capsule 25 mg (has no administration in time range)   lactated ringers BOLUS 1,000 mL (has no administration in time range)       Assessments & Plan (with Medical Decision Making)   Jennifer Cervantes is a 21 year old female who presents with sickle cell pain crisis, similar to prior sickle cell pain. Labs ordered to further evaluate the patient. Medications for symptomatic relief were ordered for the patient per her ER care plan.      Labs remarkable for  Hgb 8.3, at pt's baseline    After medications were given in the ER, the pt feels much better and would like to go home. She states she is working on getting set up to go to the infusion clinic tomorrow     I have reviewed the nursing notes.    I have reviewed the findings, diagnosis, plan and need for follow up with the patient.    US negative for DVT    Patient to be discharged home. Advised to follow up with PCP/hematology and to go to the infusion center tomorrow as scheduled if able. To return to ER immediately with any new/worsening symptoms. Plan of care discussed with patient who expresses understanding and agrees with plan of care.    New Prescriptions    No medications on file       Final diagnoses:   Sickle cell pain crisis (H)     Lis Hall MD  5/26/2021   [facility] EMERGENCY DEPARTMENT     Lis Hall MD  05/26/21 9712       Lis Hall MD  05/26/21 2698

## 2021-05-26 NOTE — NURSING NOTE
"Oncology Rooming Note    May 26, 2021 11:48 AM   Jennifer Cervantes is a 21 year old female who presents for:    Chief Complaint   Patient presents with     Port Draw     Labs drawn via port by rn in lab. VS taken.     Oncology Clinic Visit     SICKLE CELL      Initial Vitals: /88 (BP Location: Right arm, Patient Position: Sitting, Cuff Size: Adult Regular)   Pulse 115   Temp 98.3  F (36.8  C) (Oral)   Resp 16   Wt 76.4 kg (168 lb 8 oz)   SpO2 96%   BMI 28.92 kg/m   Estimated body mass index is 28.92 kg/m  as calculated from the following:    Height as of 5/25/21: 1.626 m (5' 4\").    Weight as of this encounter: 76.4 kg (168 lb 8 oz). Body surface area is 1.86 meters squared.  Extreme Pain (8) Comment: Data Unavailable   No LMP recorded. Patient has had an injection.  Allergies reviewed: Yes  Medications reviewed: Yes    Medications: Medication refills not needed today.  Pharmacy name entered into [employer]:    [facility] - [place], [place] - [address]  [facility][id number] - [facility] [address] SEC OF [place], [place] - [phone]  [id number] - [facility] [address] [id number] [address] [address] [facility] [address] [place] [address] [facility] - [place], [place] - [address]    Clinical concerns: No new concerns today  Andrei  was NOT notified.      Dee Dee Lee            "

## 2021-05-26 NOTE — ED NOTES
Patient ambulated out of department safely and without incident to meet ride in lobby. Discharge instructions and paperwork given. Port de-accessed. Vitals taken as charted.

## 2021-05-26 NOTE — ED TRIAGE NOTES
Pt presents with a sickle cell pain crisis, complaining of pain in her back and her sides. This pain is not different from her usual flares.

## 2021-05-26 NOTE — PROGRESS NOTES
"Dear patient. Thank you for visiting with me. I want you to feel respected, understood, and empowered. \"Respect\" is valuing you as much as I would a close family member. \"Empowerment\" happens when you are fully informed, and can make the best possible decision for you.  Please ask me questions!  Challenge anything that is not clear.    Medical records are primarily used as memory aids for me and my colleagues. Things to know about my documentation style:  - The 'problem list' includes current symptoms or diagnoses, and some problems that are resolved but may return. I use the past medical history for problems not expected to return.  - I use single quotation marks for things that you or I said, when I want to clarify who was speaking.  - I use double quotation marks when copying a term from elsewhere in your records. Italics (besides here) may also denote a quotation.  If you have questions or concerns, please contact me; I will reply as soon as time allows.    Context    Jennifer Cervantes is a 21 year old woman, with concerns including:  Chief Complaint   Patient presents with     Recheck Medication     follow up and DEPO shot     PCP: Suraj Case   Visit type: problem-oriented    /83   Pulse 105   SpO2 96%     Problems and progress    I was happy to finally meet Jennifer in person.     Sickle cell disease  Painful crises  Frequent crises. She has had many ER visits, and as I learned in my earlier video visit, she heads to the ER with onset of crisis pain, there doesn't appear to be any other optoin  She gets fluids in the ED. She tries to get hydrated.  She has tried gabapentin for a while, and was taken off but she doesn't remember why.  The powerport is , only there for 2 months. The current one is her 19th port (the last one was a pediatric size).  She has more trouble with winter/cold - she has more trouble with hydration.   She said she declined the other of BMT - based on pros and " cons, she feels like it is dangerous and is concerned based on things she has read from other patients online.        At risk for dissatisfaction  She talked openly about her experiences, both good and bad. She often feels there are respect issues - she feels like she will be deferred behind other people in the ER, people who have been there for shorter periods of time. She talked about attitudes she perceives from ER and hospital staff, and has experienced from various other providers.      Contraception  She wishes to continue with depo shots. She swears she has not been sexually active since well before the last depo shot.        Migraines.   Right-sided starts at eye, goes to side of face, associated with nausea and vomiting. She feels woozy and dizzy, no visual stuff. Includes photophobia. Frequency increasing lately - daily last 3 days,   She takes acetaminophen for this.  She doesn't recall several other medicines I mentioned    Stroke 1 yo.  In 2017 she had a ministroke, after she was taken off blood transfusions. She was put to sleep in the ICU for several days.     She has elevated ferritin from her repeated transfusions, and now only gets transfusions when she really needs it.  She is on desferal every 2 weeks     Gastritis developed when she wasn't eating right. 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 She has had difficulty connecting sometimes because of frequent crises.                    No specialty comments available.      Other comments      Other physical exam  Physical Exam  Constitutional:       General: She is not in acute distress.     Appearance: Normal appearance. She is not ill-appearing.   HENT:      Head: Normocephalic.      Nose: Nose normal.   Eyes:      General: No scleral icterus.        Right eye: No discharge.         Left eye: No discharge.       Extraocular Movements: Extraocular movements intact.   Pulmonary:      Effort: Pulmonary effort is normal. No respiratory distress.   Neurological:      Mental Status: She is alert.   Psychiatric:         Mood and Affect: Mood normal.         Behavior: Behavior normal.         Thought Content: Thought content normal.         Judgment: Judgment normal.            About this visit:  Time note ([id number], 69-83'): The total time (on the date of service) for this service was 70 minutes, including discussion/face-to-face, chart review, interpretation not otherwise reported, documentation, and updating of the computerized record.  Comment about data reviewed: I personally reviewed, interpreted, and/or confirmed interpretation of multiple ER and hematology records

## 2021-05-27 ENCOUNTER — HOME INFUSION (PRE-WILLOW HOME INFUSION) (OUTPATIENT)
Dept: PHARMACY | Facility: CLINIC | Age: 21
End: 2021-05-27

## 2021-05-27 ENCOUNTER — TELEPHONE (OUTPATIENT)
Dept: ONCOLOGY | Facility: CLINIC | Age: 21
End: 2021-05-27

## 2021-05-27 ENCOUNTER — PATIENT OUTREACH (OUTPATIENT)
Dept: CARE COORDINATION | Facility: CLINIC | Age: 21
End: 2021-05-27

## 2021-05-27 ENCOUNTER — INFUSION THERAPY VISIT (OUTPATIENT)
Dept: TRANSPLANT | Facility: CLINIC | Age: 21
End: 2021-05-27
Attending: PEDIATRICS
Payer: COMMERCIAL

## 2021-05-27 VITALS
HEART RATE: 106 BPM | RESPIRATION RATE: 16 BRPM | DIASTOLIC BLOOD PRESSURE: 77 MMHG | WEIGHT: 168 LBS | SYSTOLIC BLOOD PRESSURE: 133 MMHG | TEMPERATURE: 98.2 F | BODY MASS INDEX: 28.84 KG/M2 | OXYGEN SATURATION: 94 %

## 2021-05-27 DIAGNOSIS — D57.00 SICKLE CELL PAIN CRISIS (H): Primary | ICD-10-CM

## 2021-05-27 PROCEDURE — 250N000011 HC RX IP 250 OP 636: Performed by: PEDIATRICS

## 2021-05-27 PROCEDURE — 96374 THER/PROPH/DIAG INJ IV PUSH: CPT

## 2021-05-27 PROCEDURE — 258N000003 HC RX IP 258 OP 636: Performed by: PEDIATRICS

## 2021-05-27 PROCEDURE — 96361 HYDRATE IV INFUSION ADD-ON: CPT

## 2021-05-27 PROCEDURE — 999N000127 HC STATISTIC PERIPHERAL IV START W US GUIDANCE

## 2021-05-27 PROCEDURE — 250N000011 HC RX IP 250 OP 636: Performed by: EMERGENCY MEDICINE

## 2021-05-27 PROCEDURE — 96376 TX/PRO/DX INJ SAME DRUG ADON: CPT

## 2021-05-27 RX ORDER — DIPHENHYDRAMINE HCL 25 MG
25 CAPSULE ORAL
Status: CANCELLED
Start: 2021-05-27

## 2021-05-27 RX ORDER — HEPARIN SODIUM (PORCINE) LOCK FLUSH IV SOLN 100 UNIT/ML 100 UNIT/ML
5 SOLUTION INTRAVENOUS
Status: DISCONTINUED | OUTPATIENT
Start: 2021-05-27 | End: 2021-05-27 | Stop reason: HOSPADM

## 2021-05-27 RX ORDER — MORPHINE SULFATE 2 MG/ML
2 INJECTION, SOLUTION INTRAMUSCULAR; INTRAVENOUS
Status: CANCELLED
Start: 2021-05-27

## 2021-05-27 RX ORDER — HEPARIN SODIUM,PORCINE 10 UNIT/ML
5 VIAL (ML) INTRAVENOUS
Status: CANCELLED | OUTPATIENT
Start: 2021-05-27

## 2021-05-27 RX ORDER — MORPHINE SULFATE 2 MG/ML
2 INJECTION, SOLUTION INTRAMUSCULAR; INTRAVENOUS
Status: DISCONTINUED | OUTPATIENT
Start: 2021-05-27 | End: 2021-05-27 | Stop reason: HOSPADM

## 2021-05-27 RX ORDER — HEPARIN SODIUM (PORCINE) LOCK FLUSH IV SOLN 100 UNIT/ML 100 UNIT/ML
5 SOLUTION INTRAVENOUS
Status: CANCELLED | OUTPATIENT
Start: 2021-05-27

## 2021-05-27 RX ORDER — ONDANSETRON 8 MG/1
8 TABLET, FILM COATED ORAL
Status: CANCELLED
Start: 2021-05-27

## 2021-05-27 RX ADMIN — DEXTROSE AND SODIUM CHLORIDE 500 ML: 5; 450 INJECTION, SOLUTION INTRAVENOUS at 13:45

## 2021-05-27 RX ADMIN — MORPHINE SULFATE 2 MG: 2 INJECTION, SOLUTION INTRAMUSCULAR; INTRAVENOUS at 15:44

## 2021-05-27 RX ADMIN — Medication 5 ML: at 00:16

## 2021-05-27 RX ADMIN — MORPHINE SULFATE 2 MG: 2 INJECTION, SOLUTION INTRAMUSCULAR; INTRAVENOUS at 13:47

## 2021-05-27 RX ADMIN — MORPHINE SULFATE 2 MG: 2 INJECTION, SOLUTION INTRAMUSCULAR; INTRAVENOUS at 14:51

## 2021-05-27 NOTE — TELEPHONE ENCOUNTER
[facility] Telephone Triage Note    The following symptoms were reported:   Typical  Description:            Onset:  Yesterday  Location: lower back and sides  Character: Sharp            Intensity: 8/10    Accompanying Signs & Symptoms:  none    Chest Pain:  denies     Shortness of Breath:  denies     Fever:  denies     Chills:  denies   Cough/sore throat:  denies  Other:  denies    Therapies Tried and outcome: 6:00am took tylenol, oxycodone, oxycontin, celebrex    Improved by: heat, warm bath with epson salt with minimal relief    Takes 15-20 minutes.     The following provider was consulted:  7:42am RNCC Julieta discussed plan for anticipated infusion request today and Dr. Duncan approved IVF/Pain for typical crisis. No labs needed.     The following advice/orders were given, and/or interventions recommended:  Pending appt availability  Appt available 1:30pm     10:35am Spoke to Abdullahi  to assist with transportation, confirmed pt is at home address right now    Scheduling notified.     Patient instructions and/or follow up:      Instructed patient to seek care immediately for worsening symptoms, including: fever, chest pain, shortness of breath, dizziness.

## 2021-05-27 NOTE — PROGRESS NOTES
Social Work Follow-Up  [facility]    Data/Intervention:  Patient Name:  Jennifre Cervantes  /Age:  2000 (21 year old)    Reason for Follow-Up:  Transportation    Collaborated With:    -Elroy Rodriguez RN  -Pt  -[facility]    Intervention/Education/Resources Provided:  SW received a phone call from Jennifer Triage RN asking SW to assist with rides for pt's 1:30 pm appointment for today. Jennifer confirmed that pt is currently back at her home and not in a hotel at this time. SW called pt's insurance and set up ride through [employer] with a  time of 12:45 pm and a will call for the return ride. SW called pt to relay ride information and checked in to see how they are doing. SW asked pt when they moved back home. Pt explained that she has been back home for a couple of days and it has been going well. Pt expressed she doesn't get along well with her family members, but for now it has been okay. Pt explained that they plan on staying home for the next couple of days and is also thinking about returning to a hotel at a later time. SW encouraged pt to reach out if they are needing any assistance pt verbalized understanding and thanked SW for the call.     Assessment/Plan:  SW will remain available as needed.  Previously provided patient/family with writer's contact information and availability.       CARLOS Chavez,[facility]  Hematology/Oncology Social Worker  Phone:[phone] Pager: [phone]

## 2021-05-27 NOTE — LETTER
5/27/2021         RE: Jennifer Cervantes  [address]  [place] [address]        Dear Colleague,    Thank you for referring your patient, Jennifer Cervantes, to the [facility] AND [facility]ANSPL[facility] PRO[facility] [place]. Please see a copy of my visit note below.    Infusion Nursing Note:  Jennifer Cervantes presents today for add-on infusion.    Patient seen by provider today: No   present during visit today: Not Applicable.    Note: Patient reports 9/10 pain to low back and sides. Pain started yesterday and continues despite taking home pain medication. Took Oxycodone 10 mg this morning around 0700. Denies chest pain, SOB, N/V.     Received 500 mL D545 over 2 hours. Morphine 2 mg IVP x3 doses as ordered.     Intravenous Access:  Peripheral IV placed.    Treatment Conditions:  Not Applicable.      Post Infusion Assessment:  Patient tolerated infusion without incident.       Discharge Plan:   Patient discharged in stable condition accompanied by: self.  Departure Mode: Ambulatory.      Jennifer Lai RN                          Again, thank you for allowing me to participate in the care of your patient.        [facility],        [facility]

## 2021-05-27 NOTE — PROGRESS NOTES
Infusion Nursing Note:  Jennifer Cervantes presents today for add-on infusion.    Patient seen by provider today: No   present during visit today: Not Applicable.    Note: Patient reports 9/10 pain to low back and sides. Pain started yesterday and continues despite taking home pain medication. Took Oxycodone 10 mg this morning around 0700. Denies chest pain, SOB, N/V.     Received 500 mL D545 over 2 hours. Morphine 2 mg IVP x3 doses as ordered.     Intravenous Access:  Peripheral IV placed.    Treatment Conditions:  Not Applicable.      Post Infusion Assessment:  Patient tolerated infusion without incident.       Discharge Plan:   Patient discharged in stable condition accompanied by: self.  Departure Mode: Ambulatory.      Jennifer Lai RN

## 2021-05-28 ENCOUNTER — HOSPITAL ENCOUNTER (EMERGENCY)
Facility: CLINIC | Age: 21
Discharge: HOME | End: 2021-05-28
Attending: INTERNAL MEDICINE | Admitting: STUDENT IN AN ORGANIZED HEALTH CARE EDUCATION/TRAINING PROGRAM
Payer: COMMERCIAL

## 2021-05-28 ENCOUNTER — NURSE TRIAGE (OUTPATIENT)
Dept: ONCOLOGY | Facility: CLINIC | Age: 21
End: 2021-05-28

## 2021-05-28 VITALS
SYSTOLIC BLOOD PRESSURE: 111 MMHG | WEIGHT: 168 LBS | BODY MASS INDEX: 28.68 KG/M2 | TEMPERATURE: 98.3 F | HEART RATE: 99 BPM | DIASTOLIC BLOOD PRESSURE: 73 MMHG | HEIGHT: 64 IN | RESPIRATION RATE: 20 BRPM | OXYGEN SATURATION: 100 %

## 2021-05-28 DIAGNOSIS — D57.00 SICKLE CELL PAIN CRISIS (H): ICD-10-CM

## 2021-05-28 LAB
ALBUMIN SERPL-MCNC: 4 G/DL (ref 3.4–5)
ALP SERPL-CCNC: 98 U/L (ref 40–150)
ALT SERPL W P-5'-P-CCNC: 173 U/L (ref 0–50)
ANION GAP SERPL CALCULATED.3IONS-SCNC: 6 MMOL/L (ref 3–14)
ANISOCYTOSIS BLD QL SMEAR: ABNORMAL
AST SERPL W P-5'-P-CCNC: ABNORMAL U/L (ref 0–45)
BASOPHILS # BLD AUTO: 0.5 10E9/L (ref 0–0.2)
BASOPHILS NFR BLD AUTO: 5.2 %
BILIRUB SERPL-MCNC: 2.7 MG/DL (ref 0.2–1.3)
BUN SERPL-MCNC: 6 MG/DL (ref 7–30)
CALCIUM SERPL-MCNC: 9.3 MG/DL (ref 8.5–10.1)
CHLORIDE SERPL-SCNC: 106 MMOL/L (ref 94–109)
CO2 SERPL-SCNC: 24 MMOL/L (ref 20–32)
CREAT SERPL-MCNC: 0.49 MG/DL (ref 0.52–1.04)
DIFFERENTIAL METHOD BLD: ABNORMAL
EOSINOPHIL # BLD AUTO: 1.6 10E9/L (ref 0–0.7)
EOSINOPHIL NFR BLD AUTO: 16.5 %
ERYTHROCYTE [DISTWIDTH] IN BLOOD BY AUTOMATED COUNT: 21.1 % (ref 10–15)
GFR SERPL CREATININE-BSD FRML MDRD: >90 ML/MIN/{1.73_M2}
GLUCOSE SERPL-MCNC: 81 MG/DL (ref 70–99)
HCT VFR BLD AUTO: 28.6 % (ref 35–47)
HGB BLD-MCNC: 9.3 G/DL (ref 11.7–15.7)
LYMPHOCYTES # BLD AUTO: 3.1 10E9/L (ref 0.8–5.3)
LYMPHOCYTES NFR BLD AUTO: 32.2 %
MACROCYTES BLD QL SMEAR: PRESENT
MCH RBC QN AUTO: 29.2 PG (ref 26.5–33)
MCHC RBC AUTO-ENTMCNC: 32.5 G/DL (ref 31.5–36.5)
MCV RBC AUTO: 90 FL (ref 78–100)
MONOCYTES # BLD AUTO: 0.2 10E9/L (ref 0–1.3)
MONOCYTES NFR BLD AUTO: 1.7 %
MYELOCYTES # BLD: 0.1 10E9/L
MYELOCYTES NFR BLD MANUAL: 0.9 %
NEUTROPHILS # BLD AUTO: 4.2 10E9/L (ref 1.6–8.3)
NEUTROPHILS NFR BLD AUTO: 43.5 %
NRBC # BLD AUTO: 0.2 10*3/UL
NRBC BLD AUTO-RTO: 2 /100
PLATELET # BLD AUTO: 265 10E9/L (ref 150–450)
PLATELET # BLD EST: ABNORMAL 10*3/UL
POIKILOCYTOSIS BLD QL SMEAR: ABNORMAL
POLYCHROMASIA BLD QL SMEAR: ABNORMAL
POTASSIUM SERPL-SCNC: 4.2 MMOL/L (ref 3.4–5.3)
PROT SERPL-MCNC: 8.7 G/DL (ref 6.8–8.8)
RBC # BLD AUTO: 3.19 10E12/L (ref 3.8–5.2)
RETICS # AUTO: 476.6 10E9/L (ref 25–95)
RETICS/RBC NFR AUTO: 14.9 % (ref 0.5–2)
SODIUM SERPL-SCNC: 135 MMOL/L (ref 133–144)
WBC # BLD AUTO: 9.6 10E9/L (ref 4–11)

## 2021-05-28 PROCEDURE — 99285 EMERGENCY DEPT VISIT HI MDM: CPT | Mod: 25 | Performed by: STUDENT IN AN ORGANIZED HEALTH CARE EDUCATION/TRAINING PROGRAM

## 2021-05-28 PROCEDURE — 99285 EMERGENCY DEPT VISIT HI MDM: CPT | Performed by: STUDENT IN AN ORGANIZED HEALTH CARE EDUCATION/TRAINING PROGRAM

## 2021-05-28 PROCEDURE — 80048 BASIC METABOLIC PNL TOTAL CA: CPT

## 2021-05-28 PROCEDURE — 96375 TX/PRO/DX INJ NEW DRUG ADDON: CPT | Performed by: STUDENT IN AN ORGANIZED HEALTH CARE EDUCATION/TRAINING PROGRAM

## 2021-05-28 PROCEDURE — 250N000011 HC RX IP 250 OP 636: Performed by: STUDENT IN AN ORGANIZED HEALTH CARE EDUCATION/TRAINING PROGRAM

## 2021-05-28 PROCEDURE — 84460 ALANINE AMINO (ALT) (SGPT): CPT

## 2021-05-28 PROCEDURE — 96361 HYDRATE IV INFUSION ADD-ON: CPT | Performed by: STUDENT IN AN ORGANIZED HEALTH CARE EDUCATION/TRAINING PROGRAM

## 2021-05-28 PROCEDURE — 84075 ASSAY ALKALINE PHOSPHATASE: CPT

## 2021-05-28 PROCEDURE — 96374 THER/PROPH/DIAG INJ IV PUSH: CPT | Performed by: STUDENT IN AN ORGANIZED HEALTH CARE EDUCATION/TRAINING PROGRAM

## 2021-05-28 PROCEDURE — 96376 TX/PRO/DX INJ SAME DRUG ADON: CPT | Performed by: STUDENT IN AN ORGANIZED HEALTH CARE EDUCATION/TRAINING PROGRAM

## 2021-05-28 PROCEDURE — 258N000003 HC RX IP 258 OP 636: Performed by: STUDENT IN AN ORGANIZED HEALTH CARE EDUCATION/TRAINING PROGRAM

## 2021-05-28 PROCEDURE — 85025 COMPLETE CBC W/AUTO DIFF WBC: CPT | Performed by: STUDENT IN AN ORGANIZED HEALTH CARE EDUCATION/TRAINING PROGRAM

## 2021-05-28 PROCEDURE — 82247 BILIRUBIN TOTAL: CPT

## 2021-05-28 PROCEDURE — 82040 ASSAY OF SERUM ALBUMIN: CPT

## 2021-05-28 PROCEDURE — 84155 ASSAY OF PROTEIN SERUM: CPT

## 2021-05-28 PROCEDURE — 85045 AUTOMATED RETICULOCYTE COUNT: CPT | Performed by: STUDENT IN AN ORGANIZED HEALTH CARE EDUCATION/TRAINING PROGRAM

## 2021-05-28 RX ORDER — SODIUM CHLORIDE 9 MG/ML
INJECTION, SOLUTION INTRAVENOUS CONTINUOUS
Status: DISCONTINUED | OUTPATIENT
Start: 2021-05-28 | End: 2021-05-28 | Stop reason: HOSPADM

## 2021-05-28 RX ORDER — MORPHINE SULFATE 2 MG/ML
2 INJECTION, SOLUTION INTRAMUSCULAR; INTRAVENOUS EVERY 30 MIN PRN
Status: COMPLETED | OUTPATIENT
Start: 2021-05-28 | End: 2021-05-28

## 2021-05-28 RX ORDER — KETOROLAC TROMETHAMINE 15 MG/ML
15 INJECTION, SOLUTION INTRAMUSCULAR; INTRAVENOUS ONCE
Status: COMPLETED | OUTPATIENT
Start: 2021-05-28 | End: 2021-05-28

## 2021-05-28 RX ADMIN — MORPHINE SULFATE 2 MG: 2 INJECTION, SOLUTION INTRAMUSCULAR; INTRAVENOUS at 14:59

## 2021-05-28 RX ADMIN — KETOROLAC TROMETHAMINE 15 MG: 15 INJECTION, SOLUTION INTRAMUSCULAR; INTRAVENOUS at 14:59

## 2021-05-28 RX ADMIN — SODIUM CHLORIDE: 9 INJECTION, SOLUTION INTRAVENOUS at 16:25

## 2021-05-28 RX ADMIN — MORPHINE SULFATE 2 MG: 2 INJECTION, SOLUTION INTRAMUSCULAR; INTRAVENOUS at 15:41

## 2021-05-28 RX ADMIN — SODIUM CHLORIDE 1000 ML: 9 INJECTION, SOLUTION INTRAVENOUS at 14:57

## 2021-05-28 RX ADMIN — MORPHINE SULFATE 2 MG: 2 INJECTION, SOLUTION INTRAMUSCULAR; INTRAVENOUS at 16:24

## 2021-05-28 ASSESSMENT — MIFFLIN-ST. JEOR: SCORE: 1507.04

## 2021-05-28 ASSESSMENT — ENCOUNTER SYMPTOMS
MYALGIAS: 1
ARTHRALGIAS: 1

## 2021-05-28 NOTE — DISCHARGE INSTRUCTIONS
You were seen for sickle cell pain. Please continue to take your home medications and follow up with your primary doctor. If your pain gets worse even though you've taken home medications, come back to the emergency department.

## 2021-05-28 NOTE — PROGRESS NOTES
This is a recent snapshot of the patient's [facility] medical record.  For current drug dose and complete information and questions, call [phone] or In Basket pool, fv home infusion ([id number])  CSN Number:  [id number]

## 2021-05-28 NOTE — TELEPHONE ENCOUNTER
[facility]    Jennifer calls in to check and see if she is able to come in for an infusion for her sickle cell pain: back and side pain. Denies other symptoms.    There are 5 patients ahead of her on the wait list for infusions today. She did not want to be placed on the wait list.     She was advised to go to the ER if pain becomes intolerable. Advised to go sooner than later since ER wait times can be longer in the afternoons and evening.

## 2021-05-28 NOTE — ED PROVIDER NOTES
[place] EMERGENCY DEPARTMENT ([facility])  May 28, 2021  ED 23 2:32 PM   History     Chief Complaint   Patient presents with     Sickle Cell Pain Crisis     The history is provided by the patient and medical records.     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  Patient has been seen multiple times over the past month for flares of her sickle cell disease.  Since April, she has been seen in the emergency department 11 times.  She was last seen in the emergency department 2 days ago for sickle cell pain.  She was evaluated by Dr. Hall who performed work-up with labs.  She was treated with occasions for symptomatic relief, had improvement with this.  She was discharged with plan to follow-up with infusion center.  She returns today with sickle cell pain flare focal in her lower back and sides.  This pain is similar to prior sickle cell pain flares. No headache, fevers. Tried oxycodone, OxyContin, Tylenol and Celebrex. This helped a bit but the pain came back. She tried a hot bath, no improvement with this. No pain in her legs. She doesn't wear abdominal binder every day, wears it to hold heat packs onto her back. She hasn't tried ibuprofen, was instructed not to by her doctor due to the other medications she is on.       PAST MEDICAL HISTORY:   Past Medical History:   Diagnosis Date     Anxiety      Bleeding disorder (H)      Blood clotting disorder (H)      Cerebral infarction (H) 2015     Cognitive developmental delay     low IQ. 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 (H)     right hand contractures     Iron overload due to  repeated red blood cell transfusions      Migraines      Multiple subsegmental pulmonary emboli without acute cor pulmonale (H) 02/01/2021     Oppositional defiant behavior      Superficial venous thrombosis of arm, right 03/25/2021     Uncomplicated asthma        PAST SURGICAL HISTORY:   Past Surgical History:   Procedure Laterality Date     AS INSERT TUNNELED CV 2 CATH W/O PORT/PUMP       C BREAST REDUCTION (INCLUDES LIPO) TIER 3 Bilateral 04/23/2019     CHOLECYSTECTOMY       INSERT PORT VASCULAR ACCESS Left 4/21/2021    Procedure: INSERTION, VASCULAR ACCESS PORT (NOT SURE ON SIDE UNTIL REMOVAL);  Surgeon: Rajan More MD;  Location: [facility] OR     IR CHEST PORT PLACEMENT > 5 YRS OF AGE  4/21/2021     IR CVC NON TUNNEL LINE REMOVAL  5/6/2021     IR CVC NON TUNNEL PLACEMENT  04/07/2020     IR PORT REMOVAL LEFT  4/21/2021     REMOVE PORT VASCULAR ACCESS Left 4/21/2021    Procedure: REMOVAL, VASCULAR ACCESS PORT LEFT;  Surgeon: Rajan More MD;  Location: [facility] OR     REPAIR TENDON ELBOW Right 10/02/2019    Procedure: Right Elbow Flexor Lengthening, Flexor Pronator Slide Of Wrist and Finger, Thumb Adductor Lengthening;  Surgeon: Anai Franco MD;  Location: [facility] OR     TONSILLECTOMY Bilateral 10/02/2019    Procedure: Bilateral Tonsillectomy;  Surgeon: Farhana Guy MD;  Location: [facility] OR       Past medical history, past surgical history, medications, and allergies were reviewed with the patient. Additional pertinent items: None    FAMILY HISTORY:   Family History   Problem Relation Age of Onset     Sickle Cell Trait Mother      Hypertension Mother      Asthma Mother      Sickle Cell Trait Father        SOCIAL HISTORY:   Social History     Tobacco Use     Smoking status: Never Smoker     Smokeless tobacco: Never Used   Substance Use Topics     Alcohol use: Not Currently     Alcohol/week: 0.0 standard drinks     Social history was reviewed with the patient. Additional pertinent items:  None      Patient's Medications   New Prescriptions    No medications on file   Previous Medications    ACETAMINOPHEN (TYLENOL) 325 MG TABLET    Take 2 tablets (650 mg) by mouth every 6 hours as needed for mild pain    ALBUTEROL (PROAIR HFA/PROVENTIL HFA/VENTOLIN HFA) 108 (90 BASE) MCG/ACT INHALER    Inhale 2 puffs into the lungs every 6 hours as needed for shortness of breath / dyspnea or wheezing    ALBUTEROL (PROVENTIL) (2.5 MG/3ML) 0.083% NEB SOLUTION    Take 1 vial (2.5 mg) by nebulization every 6 hours as needed for shortness of breath / dyspnea or wheezing    ARIPIPRAZOLE (ABILIFY) 2 MG TABLET    Take 1 tablet (2 mg) by mouth daily    BUDESONIDE-FORMOTEROL (SYMBICORT) 160-4.5 MCG/ACT INHALER    Inhale 1 puff into the lungs every evening    CELECOXIB (CELEBREX) 100 MG CAPSULE    Take 1 capsule (100 mg) by mouth 2 times daily    DICLOFENAC (VOLTAREN) 1 % TOPICAL GEL    Apply 4 g topically 4 times daily as needed for moderate pain Apply to back, legs, and arms for pain    DIPHENHYDRAMINE (BENADRYL) 25 MG CAPSULE    Take 1-2 capsules (25-50 mg) by mouth nightly as needed for sleep    EPINEPHRINE (ANY BX GENERIC EQUIV) 0.3 MG/0.3ML INJECTION 2-PACK    Inject 0.3 mLs (0.3 mg) into the muscle as needed for anaphylaxis    HYDROXYUREA 1000 MG TABS    Take 2,000 mg by mouth daily    HYDROXYZINE (ATARAX) 25 MG TABLET    Take 1 tablet (25 mg) by mouth 3 times daily as needed for anxiety    JADENU 360 MG TABLET    Take 4 tablets (1,440 mg) by mouth every evening    LIDOCAINE-PRILOCAINE (EMLA) 2.5-2.5 % EXTERNAL CREAM    Apply topically as needed for moderate pain    MEDROXYPROGESTERONE (DEPO-PROVERA) 150 MG/ML IM INJECTION    Inject 150 mg into the muscle    NALOXONE (NARCAN) 4 MG/0.1ML NASAL SPRAY    Spray 1 spray (4 mg) into one nostril alternating nostrils once as needed for opioid reversal every 2-3 minutes until assistance arrives    ONDANSETRON (ZOFRAN) 8 MG TABLET        OXYCODONE (OXYCONTIN) 10 MG 12 HR TABLET     "Take 1 tablet (10 mg) by mouth every 12 hours    OXYCODONE IR (ROXICODONE) 15 MG TABLET    Take 1 tablet (15mg) by mouth every 4-6 hours as needed for severe pain. Goal 4 per day. Max 6 per day.    RIVAROXABAN ANTICOAGULANT (XARELTO ANTICOAGULANT) 20 MG TABS TABLET    Take 1 tablet (20 mg) by mouth daily (with dinner) Starting on 5/29 after completing your 15mg twice daily dosing on 5/28    RIVAROXABAN ANTICOAGULANT (XARELTO) 15 MG TABS TABLET    Take 1 tablet (15 mg) by mouth 2 times daily (with meals) for 17 days    SERTRALINE (ZOLOFT) 100 MG TABLET    Take 2 tablets (200 mg) by mouth daily   Modified Medications    No medications on file   Discontinued Medications    No medications on file          Allergies   Allergen Reactions     Contrast Dye      Hives and breathing issues     Fish-Derived Products Hives     Seafood Hives     Diagnostic X-Ray Materials      Gadolinium         Review of Systems   Musculoskeletal: Positive for arthralgias and myalgias.   Hematological:        Sickle cell pain flare   All other systems reviewed and are negative.    A complete review of systems was performed with pertinent positives and negatives noted in the HPI, and all other systems negative.    Physical Exam   BP: 131/77  Pulse: 112  Temp: 98.3  F (36.8  C)  Resp: 16  Height: 162.6 cm (5' 4\")  Weight: 76.2 kg (168 lb)  SpO2: 94 %      Physical Exam  General: no acute distress. Appears stated age.   HENT: MMM, no oropharyngeal lesions  Eyes: PERRL, normal sclerae  Neck: non-tender, supple  Cardio: reg rate. Regular rhythm. Extremities well perfused  Resp: Normal work of breathing, normal respiratory rate.  Chest/Back: no visual signs of trauma, no CVA tenderness  Abdomen: no tenderness, non-distended, no rebound, no guarding  Neuro: alert and fully oriented. CN II-XII grossly intact. Grossly normal strength and sensation in all extremities.   MSK: no deformities. Grossly normal ROM in extremities.   Integumentary/Skin: no rash " visualized, normal color  Psych: normal affect, normal behavior    ED Course        Procedures          Results for orders placed or performed during the hospital encounter of 05/28/21 (from the past 24 hour(s))   CBC with platelets differential   Result Value Ref Range    WBC 9.6 4.0 - 11.0 10e9/L    RBC Count 3.19 (L) 3.8 - 5.2 10e12/L    Hemoglobin 9.3 (L) 11.7 - 15.7 g/dL    Hematocrit 28.6 (L) 35.0 - 47.0 %    MCV 90 78 - 100 fl    MCH 29.2 26.5 - 33.0 pg    MCHC 32.5 31.5 - 36.5 g/dL    RDW 21.1 (H) 10.0 - 15.0 %    Platelet Count 265 150 - 450 10e9/L    Diff Method PENDING    Comprehensive metabolic panel   Result Value Ref Range    Sodium 135 133 - 144 mmol/L    Potassium 4.2 3.4 - 5.3 mmol/L    Chloride 106 94 - 109 mmol/L    Carbon Dioxide 24 20 - 32 mmol/L    Anion Gap 6 3 - 14 mmol/L    Glucose 81 70 - 99 mg/dL    Urea Nitrogen 6 (L) 7 - 30 mg/dL    Creatinine 0.49 (L) 0.52 - 1.04 mg/dL    GFR Estimate >90 >60 mL/min/[1.73_m2]    GFR Estimate If Black >90 >60 mL/min/[1.73_m2]    Calcium 9.3 8.5 - 10.1 mg/dL    Bilirubin Total 2.7 (H) 0.2 - 1.3 mg/dL    Albumin 4.0 3.4 - 5.0 g/dL    Protein Total 8.7 6.8 - 8.8 g/dL    Alkaline Phosphatase 98 40 - 150 U/L     (H) 0 - 50 U/L    AST Canceled, Test credited 0 - 45 U/L     Medications   0.9% sodium chloride BOLUS (0 mLs Intravenous Stopped 5/28/21 1624)     Followed by   sodium chloride 0.9% infusion ( Intravenous New Bag 5/28/21 1625)   morphine (PF) injection 2 mg (2 mg Intravenous Given 5/28/21 1624)   ketorolac (TORADOL) injection 15 mg (15 mg Intravenous Given 5/28/21 1459)             Assessments & Plan (with Medical Decision Making)   This is a 21-year-old female with a history of sickle cell disease who presents with sickle cell pain in the back and side of her legs. Her labs are consistent with sickle cell crisis. She does not have any concerning findings for acute chest syndrome. She was given several doses of pain medicine here after which  she felt much better and was discharged home.     I have reviewed the nursing notes.    I have reviewed the findings, diagnosis, plan and need for follow up with the patient.    Impression:  Final diagnoses:   Sickle cell pain crisis (H)       5/28/2021   [facility] EMERGENCY DEPARTMENT     Jered Michael MD  05/28/21 1061

## 2021-05-28 NOTE — ED TRIAGE NOTES
Triage Assessment & Note:    Patient presents with: Sickle cell pain in on her back and sides. 3rd or 4th visit this week.     Home Treatments/Remedies: Prescribed meds     Febrile / Afebrile? Afebrile     Duration of C/o:      Feliz Marquez RN  May 28, 2021

## 2021-05-29 ENCOUNTER — HOSPITAL ENCOUNTER (EMERGENCY)
Facility: CLINIC | Age: 21
Discharge: HOME | End: 2021-05-29
Attending: FAMILY MEDICINE | Admitting: FAMILY MEDICINE
Payer: COMMERCIAL

## 2021-05-29 ENCOUNTER — HOME INFUSION (PRE-WILLOW HOME INFUSION) (OUTPATIENT)
Dept: PHARMACY | Facility: CLINIC | Age: 21
End: 2021-05-29

## 2021-05-29 VITALS
BODY MASS INDEX: 28.84 KG/M2 | SYSTOLIC BLOOD PRESSURE: 112 MMHG | HEART RATE: 105 BPM | WEIGHT: 168 LBS | DIASTOLIC BLOOD PRESSURE: 81 MMHG | RESPIRATION RATE: 16 BRPM | TEMPERATURE: 98.7 F | OXYGEN SATURATION: 99 %

## 2021-05-29 DIAGNOSIS — D57.00 SICKLE CELL PAIN CRISIS (H): ICD-10-CM

## 2021-05-29 LAB
ALBUMIN SERPL-MCNC: 3.9 G/DL (ref 3.4–5)
ALP SERPL-CCNC: 110 U/L (ref 40–150)
ALT SERPL W P-5'-P-CCNC: 182 U/L (ref 0–50)
ANION GAP SERPL CALCULATED.3IONS-SCNC: 10 MMOL/L (ref 3–14)
AST SERPL W P-5'-P-CCNC: 151 U/L (ref 0–45)
BASOPHILS # BLD AUTO: 0.3 10E9/L (ref 0–0.2)
BASOPHILS NFR BLD AUTO: 2.7 %
BILIRUB SERPL-MCNC: 2.9 MG/DL (ref 0.2–1.3)
BUN SERPL-MCNC: 6 MG/DL (ref 7–30)
CALCIUM SERPL-MCNC: 9.2 MG/DL (ref 8.5–10.1)
CHLORIDE SERPL-SCNC: 106 MMOL/L (ref 94–109)
CO2 SERPL-SCNC: 19 MMOL/L (ref 20–32)
CREAT SERPL-MCNC: 0.56 MG/DL (ref 0.52–1.04)
DIFFERENTIAL METHOD BLD: ABNORMAL
EOSINOPHIL # BLD AUTO: 1.4 10E9/L (ref 0–0.7)
EOSINOPHIL NFR BLD AUTO: 13 %
ERYTHROCYTE [DISTWIDTH] IN BLOOD BY AUTOMATED COUNT: 21 % (ref 10–15)
GFR SERPL CREATININE-BSD FRML MDRD: >90 ML/MIN/{1.73_M2}
GLUCOSE SERPL-MCNC: 98 MG/DL (ref 70–99)
HCT VFR BLD AUTO: 28.9 % (ref 35–47)
HGB BLD-MCNC: 9.4 G/DL (ref 11.7–15.7)
IMM GRANULOCYTES # BLD: 0.1 10E9/L (ref 0–0.4)
IMM GRANULOCYTES NFR BLD: 0.5 %
LIPASE SERPL-CCNC: 137 U/L (ref 73–393)
LYMPHOCYTES # BLD AUTO: 2.7 10E9/L (ref 0.8–5.3)
LYMPHOCYTES NFR BLD AUTO: 25.5 %
MCH RBC QN AUTO: 29.7 PG (ref 26.5–33)
MCHC RBC AUTO-ENTMCNC: 32.5 G/DL (ref 31.5–36.5)
MCV RBC AUTO: 92 FL (ref 78–100)
MONOCYTES # BLD AUTO: 0.8 10E9/L (ref 0–1.3)
MONOCYTES NFR BLD AUTO: 7.4 %
NEUTROPHILS # BLD AUTO: 5.4 10E9/L (ref 1.6–8.3)
NEUTROPHILS NFR BLD AUTO: 50.9 %
NRBC # BLD AUTO: 0.4 10*3/UL
NRBC BLD AUTO-RTO: 4 /100
PLATELET # BLD AUTO: 274 10E9/L (ref 150–450)
POTASSIUM SERPL-SCNC: 3.7 MMOL/L (ref 3.4–5.3)
PROT SERPL-MCNC: 8.7 G/DL (ref 6.8–8.8)
RBC # BLD AUTO: 3.16 10E12/L (ref 3.8–5.2)
SODIUM SERPL-SCNC: 136 MMOL/L (ref 133–144)
WBC # BLD AUTO: 10.6 10E9/L (ref 4–11)

## 2021-05-29 PROCEDURE — 250N000011 HC RX IP 250 OP 636: Performed by: FAMILY MEDICINE

## 2021-05-29 PROCEDURE — 96376 TX/PRO/DX INJ SAME DRUG ADON: CPT

## 2021-05-29 PROCEDURE — 250N000013 HC RX MED GY IP 250 OP 250 PS 637: Performed by: FAMILY MEDICINE

## 2021-05-29 PROCEDURE — 258N000003 HC RX IP 258 OP 636: Performed by: FAMILY MEDICINE

## 2021-05-29 PROCEDURE — 99285 EMERGENCY DEPT VISIT HI MDM: CPT | Mod: 25

## 2021-05-29 PROCEDURE — 96361 HYDRATE IV INFUSION ADD-ON: CPT

## 2021-05-29 PROCEDURE — 96374 THER/PROPH/DIAG INJ IV PUSH: CPT

## 2021-05-29 PROCEDURE — 96375 TX/PRO/DX INJ NEW DRUG ADDON: CPT

## 2021-05-29 PROCEDURE — 83690 ASSAY OF LIPASE: CPT | Performed by: FAMILY MEDICINE

## 2021-05-29 PROCEDURE — 85025 COMPLETE CBC W/AUTO DIFF WBC: CPT | Performed by: FAMILY MEDICINE

## 2021-05-29 PROCEDURE — 80053 COMPREHEN METABOLIC PANEL: CPT | Performed by: FAMILY MEDICINE

## 2021-05-29 RX ORDER — ONDANSETRON 2 MG/ML
4 INJECTION INTRAMUSCULAR; INTRAVENOUS EVERY 30 MIN PRN
Status: DISCONTINUED | OUTPATIENT
Start: 2021-05-29 | End: 2021-05-30 | Stop reason: HOSPADM

## 2021-05-29 RX ORDER — LIDOCAINE 40 MG/G
CREAM TOPICAL
Status: DISCONTINUED | OUTPATIENT
Start: 2021-05-29 | End: 2021-05-30 | Stop reason: HOSPADM

## 2021-05-29 RX ORDER — SODIUM CHLORIDE 9 MG/ML
INJECTION, SOLUTION INTRAVENOUS CONTINUOUS
Status: DISCONTINUED | OUTPATIENT
Start: 2021-05-29 | End: 2021-05-30 | Stop reason: HOSPADM

## 2021-05-29 RX ORDER — MORPHINE SULFATE 2 MG/ML
2 INJECTION, SOLUTION INTRAMUSCULAR; INTRAVENOUS ONCE
Status: COMPLETED | OUTPATIENT
Start: 2021-05-29 | End: 2021-05-29

## 2021-05-29 RX ORDER — DIPHENHYDRAMINE HCL 25 MG
25 CAPSULE ORAL ONCE
Status: COMPLETED | OUTPATIENT
Start: 2021-05-29 | End: 2021-05-29

## 2021-05-29 RX ORDER — HEPARIN SODIUM (PORCINE) LOCK FLUSH IV SOLN 100 UNIT/ML 100 UNIT/ML
5 SOLUTION INTRAVENOUS ONCE
Status: COMPLETED | OUTPATIENT
Start: 2021-05-29 | End: 2021-05-29

## 2021-05-29 RX ORDER — MORPHINE SULFATE 2 MG/ML
2 INJECTION, SOLUTION INTRAMUSCULAR; INTRAVENOUS
Status: COMPLETED | OUTPATIENT
Start: 2021-05-29 | End: 2021-05-29

## 2021-05-29 RX ADMIN — ONDANSETRON 4 MG: 2 INJECTION INTRAMUSCULAR; INTRAVENOUS at 19:05

## 2021-05-29 RX ADMIN — MORPHINE SULFATE 2 MG: 2 INJECTION, SOLUTION INTRAMUSCULAR; INTRAVENOUS at 22:13

## 2021-05-29 RX ADMIN — MORPHINE SULFATE 2 MG: 2 INJECTION, SOLUTION INTRAMUSCULAR; INTRAVENOUS at 19:04

## 2021-05-29 RX ADMIN — DIPHENHYDRAMINE HYDROCHLORIDE 25 MG: 25 CAPSULE ORAL at 19:04

## 2021-05-29 RX ADMIN — MORPHINE SULFATE 2 MG: 2 INJECTION, SOLUTION INTRAMUSCULAR; INTRAVENOUS at 21:01

## 2021-05-29 RX ADMIN — SODIUM CHLORIDE 1000 ML: 9 INJECTION, SOLUTION INTRAVENOUS at 19:04

## 2021-05-29 RX ADMIN — Medication 5 ML: at 22:58

## 2021-05-29 ASSESSMENT — ENCOUNTER SYMPTOMS
CONFUSION: 0
EYE REDNESS: 0
MYALGIAS: 1
DYSURIA: 0
COUGH: 0
HEADACHES: 0
DIFFICULTY URINATING: 0
FEVER: 0
SHORTNESS OF BREATH: 0
ABDOMINAL PAIN: 0
NECK STIFFNESS: 0
COLOR CHANGE: 0
FREQUENCY: 0

## 2021-05-29 NOTE — ED PROVIDER NOTES
ED Provider Note  [facility]      History     Chief Complaint   Patient presents with     Sickle Cell Pain Crisis     HPI  Jennifer Cervantes is a 21 year old female with a PMH of sickle cell disease, sickle cell pain crisis, cerebral infarction, hemiplegia and hemiparesis, PE, DVT, asthma, ASCUS of cervix, hemochromatosis due to transfusions and emergency care plan in place who presents to the ED today complaining of sickle cell pain crisis.  She states her current chest Port-A-Cath has been in for the past 2 months.  She states she sees Dr. Reyes for sickle cell.  She reports her at home interventions on her a warm bath, hot packs, OxyContin, oxycodone, Tylenol and Celebrex.  She states she has tried to be seen at the infusion center but they were full.  Patient states her current sickle cell pain started early this morning around midnight.  She states this is her typical sickle cell pain in her sides, hips and back.  Patient denies any urinary symptoms, chance of pregnancy, cough, chest pain, headache, cough or fever.    Patient was recently seen here in the ED yesterday on 5/28/2021 complaining of sickle cell pain crisis.  She was complaining of sickle cell pain in her lower back and sides.  She tried oxycodone, OxyContin, Tylenol and Celebrex at home with minimal relief.  Treated in the ED with normal saline bolus, morphine and Toradol.  No chest pain noted no neurological changes as noted.  Symptoms are typical for her sickle cell crisis.    Past Medical History  Past Medical History:   Diagnosis Date     Anxiety      Bleeding disorder (H)      Blood clotting disorder (H)      Cerebral infarction (H) 2015     Cognitive developmental delay     low IQ. 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 (H)     right hand contractures     Iron overload due to  repeated red blood cell transfusions      Migraines      Multiple subsegmental pulmonary emboli without acute cor pulmonale (H) 02/01/2021     Oppositional defiant behavior      Superficial venous thrombosis of arm, right 03/25/2021     Uncomplicated asthma      Past Surgical History:   Procedure Laterality Date     AS INSERT TUNNELED CV 2 CATH W/O PORT/PUMP       C BREAST REDUCTION (INCLUDES LIPO) TIER 3 Bilateral 04/23/2019     CHOLECYSTECTOMY       INSERT PORT VASCULAR ACCESS Left 4/21/2021    Procedure: INSERTION, VASCULAR ACCESS PORT (NOT SURE ON SIDE UNTIL REMOVAL);  Surgeon: Rajan More MD;  Location: [facility] OR     IR CHEST PORT PLACEMENT > 5 YRS OF AGE  4/21/2021     IR CVC NON TUNNEL LINE REMOVAL  5/6/2021     IR CVC NON TUNNEL PLACEMENT  04/07/2020     IR PORT REMOVAL LEFT  4/21/2021     REMOVE PORT VASCULAR ACCESS Left 4/21/2021    Procedure: REMOVAL, VASCULAR ACCESS PORT LEFT;  Surgeon: Rajan More MD;  Location: [facility] OR     REPAIR TENDON ELBOW Right 10/02/2019    Procedure: Right Elbow Flexor Lengthening, Flexor Pronator Slide Of Wrist and Finger, Thumb Adductor Lengthening;  Surgeon: Anai Franco MD;  Location: [facility] OR     TONSILLECTOMY Bilateral 10/02/2019    Procedure: Bilateral Tonsillectomy;  Surgeon: Farhana Guy MD;  Location: [facility] OR     celecoxib (CELEBREX) 100 MG capsule  hydrOXYzine (ATARAX) 25 MG tablet  oxyCODONE (OXYCONTIN) 10 MG 12 hr tablet  oxyCODONE IR (ROXICODONE) 15 MG tablet  acetaminophen (TYLENOL) 325 MG tablet  albuterol (PROAIR HFA/PROVENTIL HFA/VENTOLIN HFA) 108 (90 Base) MCG/ACT inhaler  albuterol (PROVENTIL) (2.5 MG/3ML) 0.083% neb solution  ARIPiprazole (ABILIFY) 2 MG tablet  budesonide-formoterol (SYMBICORT) 160-4.5 MCG/ACT Inhaler  diclofenac (VOLTAREN) 1 % topical gel  diphenhydrAMINE (BENADRYL) 25 MG capsule  EPINEPHrine (ANY BX GENERIC EQUIV) 0.3 MG/0.3ML injection 2-pack  Hydroxyurea 1000 MG TABS  JADENU 360 MG tablet  lidocaine-prilocaine  (EMLA) 2.5-2.5 % external cream  medroxyPROGESTERone (DEPO-PROVERA) 150 MG/ML IM injection  naloxone (NARCAN) 4 MG/0.1ML nasal spray  ondansetron (ZOFRAN) 8 MG tablet  rivaroxaban ANTICOAGULANT (XARELTO ANTICOAGULANT) 20 MG TABS tablet  rivaroxaban ANTICOAGULANT (XARELTO) 15 MG TABS tablet  sertraline (ZOLOFT) 100 MG tablet      Allergies   Allergen Reactions     Contrast Dye      Hives and breathing issues     Fish-Derived Products Hives     Seafood Hives     Diagnostic X-Ray Materials      Gadolinium      Family History  Family History   Problem Relation Age of Onset     Sickle Cell Trait Mother      Hypertension Mother      Asthma Mother      Sickle Cell Trait Father      Social History   Social History     Tobacco Use     Smoking status: Never Smoker     Smokeless tobacco: Never Used   Substance Use Topics     Alcohol use: Not Currently     Alcohol/week: 0.0 standard drinks     Drug use: Never      Past medical history, past surgical history, medications, allergies, family history, and social history were reviewed with the patient. No additional pertinent items.       Review of Systems   Constitutional: Positive for activity change, appetite change and fatigue. Negative for chills and fever.   HENT: Negative for congestion, sinus pressure and sore throat.    Eyes: Negative for redness and visual disturbance.   Respiratory: Negative for cough, chest tightness and shortness of breath.    Cardiovascular: Negative for chest pain.   Gastrointestinal: Negative for abdominal pain, nausea and vomiting.   Genitourinary: Negative for difficulty urinating, dysuria, frequency and urgency.   Musculoskeletal: Positive for arthralgias, back pain and myalgias (Sickle cell pain crisis, hips, back and sides). Negative for gait problem, neck pain and neck stiffness.   Skin: Negative for color change and rash.   Allergic/Immunologic: Negative for immunocompromised state.   Neurological: Positive for weakness. Negative for syncope  and headaches.   Hematological: Does not bruise/bleed easily.   Psychiatric/Behavioral: Positive for decreased concentration and dysphoric mood. Negative for confusion.   All other systems reviewed and are negative.    A complete review of systems was performed with pertinent positives and negatives noted in the HPI, and all other systems negative.           Physical Exam   BP: 130/80  Pulse: 112  Temp: 98.7  F (37.1  C)  Resp: 16  Weight: 76.2 kg (168 lb)  SpO2: 97 %  Physical Exam  Vitals signs and nursing note reviewed.   Constitutional:       General: She is in acute distress.      Appearance: She is well-developed. She is not toxic-appearing or diaphoretic.      Comments: Patient here in the ER nontoxic but soft-spoken complains of sickle cell type pain that she is had previously seen yesterday in ER.   HENT:      Head: Normocephalic and atraumatic.      Nose: Nose normal.      Mouth/Throat:      Mouth: Mucous membranes are moist.      Pharynx: No oropharyngeal exudate.   Eyes:      General: Scleral icterus (mild) present.      Extraocular Movements: Extraocular movements intact.      Pupils: Pupils are equal, round, and reactive to light.   Neck:      Musculoskeletal: Normal range of motion and neck supple. No neck rigidity or muscular tenderness.   Cardiovascular:      Rate and Rhythm: Normal rate.      Heart sounds: Normal heart sounds.   Pulmonary:      Effort: No respiratory distress.      Breath sounds: Normal breath sounds.   Abdominal:      General: Bowel sounds are normal. There is no distension.      Palpations: Abdomen is soft. There is no mass.      Tenderness: There is no abdominal tenderness. There is no guarding.   Musculoskeletal:         General: Tenderness present.      Right lower leg: No edema.      Left lower leg: No edema.      Comments: Patient with generalized tenderness in the hips back side without joint swellings etc.   Skin:     General: Skin is warm and dry.      Capillary Refill:  Capillary refill takes less than 2 seconds.      Coloration: Skin is not pale.      Findings: No erythema or rash.   Neurological:      General: No focal deficit present.      Mental Status: She is alert and oriented to person, place, and time. Mental status is at baseline.   Psychiatric:      Comments: Flattened affect but appropriate         ED Course     6:40 PM  The patient was seen and examined by Pedro Ryan MD in Room ED12.       Patient evaluated does have a left anterior chest Port-A-Cath which was accessed here IV fluids given.  Reviewed records patient given morphine 2 mg IV for total dose of 6 mg throughout the ER course with improvement.  Benadryl orally given also x1.  Zofran as needed for nausea.  Labs drawn and reviewed.  Patient's reticulocyte count was done yesterday and reviewed.  Other labs noted below.    White count 10.6 hemoglobin 9.4.  Platelets are 274.  Sodium 136 potassium 3.7.  Bicarb is 19 glucose 98.  Total bilirubin 2.9.  ALT is 182 AST is 151.  We had ordered a urinalysis when was not collected patient did not produce 1.    Patient reassessed here in the ER patient feeling better is comfortable going home states she can follow-up with her clinic she has appointment next week and will contact them etc. if any recurrent or ongoing symptoms.  At this point patient feels much better.    Procedures               Results for orders placed or performed during the hospital encounter of 05/29/21   CBC with platelets differential     Status: Abnormal   Result Value Ref Range    WBC 10.6 4.0 - 11.0 10e9/L    RBC Count 3.16 (L) 3.8 - 5.2 10e12/L    Hemoglobin 9.4 (L) 11.7 - 15.7 g/dL    Hematocrit 28.9 (L) 35.0 - 47.0 %    MCV 92 78 - 100 fl    MCH 29.7 26.5 - 33.0 pg    MCHC 32.5 31.5 - 36.5 g/dL    RDW 21.0 (H) 10.0 - 15.0 %    Platelet Count 274 150 - 450 10e9/L    Diff Method Automated Method     % Neutrophils 50.9 %    % Lymphocytes 25.5 %    % Monocytes 7.4 %    % Eosinophils 13.0 %     % Basophils 2.7 %    % Immature Granulocytes 0.5 %    Nucleated RBCs 4 (H) 0 /100    Absolute Neutrophil 5.4 1.6 - 8.3 10e9/L    Absolute Lymphocytes 2.7 0.8 - 5.3 10e9/L    Absolute Monocytes 0.8 0.0 - 1.3 10e9/L    Absolute Eosinophils 1.4 (H) 0.0 - 0.7 10e9/L    Absolute Basophils 0.3 (H) 0.0 - 0.2 10e9/L    Abs Immature Granulocytes 0.1 0 - 0.4 10e9/L    Absolute Nucleated RBC 0.4    Comprehensive metabolic panel     Status: Abnormal   Result Value Ref Range    Sodium 136 133 - 144 mmol/L    Potassium 3.7 3.4 - 5.3 mmol/L    Chloride 106 94 - 109 mmol/L    Carbon Dioxide 19 (L) 20 - 32 mmol/L    Anion Gap 10 3 - 14 mmol/L    Glucose 98 70 - 99 mg/dL    Urea Nitrogen 6 (L) 7 - 30 mg/dL    Creatinine 0.56 0.52 - 1.04 mg/dL    GFR Estimate >90 >60 mL/min/[1.73_m2]    GFR Estimate If Black >90 >60 mL/min/[1.73_m2]    Calcium 9.2 8.5 - 10.1 mg/dL    Bilirubin Total 2.9 (H) 0.2 - 1.3 mg/dL    Albumin 3.9 3.4 - 5.0 g/dL    Protein Total 8.7 6.8 - 8.8 g/dL    Alkaline Phosphatase 110 40 - 150 U/L     (H) 0 - 50 U/L     (H) 0 - 45 U/L   Lipase     Status: None   Result Value Ref Range    Lipase 137 73 - 393 U/L     Medications   0.9% sodium chloride BOLUS (0 mLs Intravenous Stopped 5/29/21 2103)   morphine (PF) injection 2 mg (2 mg Intravenous Given 5/29/21 1904)   diphenhydrAMINE (BENADRYL) capsule 25 mg (25 mg Oral Given 5/29/21 1904)   morphine (PF) injection 2 mg (2 mg Intravenous Given 5/29/21 2101)   morphine (PF) injection 2 mg (2 mg Intravenous Given 5/29/21 2213)   heparin 100 UNIT/ML injection 5 mL (5 mLs Intracatheter Given 5/29/21 2258)        Assessments & Plan (with Medical Decision Making)  21-year-old female presents ER with recurrent sickle cell symptoms.  Patient has symptoms similar to her crisis symptoms she was seen here yesterday.  No new changes noted generalized achiness etc.  No sign of chest syndrome etc.  No neurological focal findings describes pain as typical for her sickle  cell pain in the bilateral sides of back etc.  No joint swellings no sign of infection no neurological changes otherwise.  IV fluids patient treated per standard regimen with 2 mg of morphine IV x3 doses.  IV fluids patient also given an oral Benadryl Zofran as needed.  Labs otherwise appear relatively stable I did not check a reticulocyte count as one was done yesterday.  Patient here we did not get a urine sample is not having any urine symptoms.  Patient feeling better would like to be discharged to follow-up with her clinic she has appointment I believe on 4 June will make contact if any concerns return if any concerns and also patient agrees with plan feeling much better.  At this point symptoms seem to be just an ongoing exacerbation of sickle cell crisis symptoms.       I have reviewed the nursing notes. I have reviewed the findings, diagnosis, plan and need for follow up with the patient.    Discharge Medication List as of 5/29/2021 10:48 PM          Final diagnoses:   Sickle cell pain crisis (H)   I, Gtuierrez Gibson, am serving as a trained medical scribe to document services personally performed by Pedro Ryan MD, based on the provider's statements to me.     I, Pedro Ryan MD, was physically present and have reviewed and verified the accuracy of this note documented by Gutierrez Gibson.      --  Pedro Ryan MD  [facility] EMERGENCY DEPARTMENT  5/29/2021    This note was created at least in part by the use of dragon voice dictation system. Inadvertent typographical errors may still exist.  Pedro Ryan MD.    Patient evaluated in the emergency department during the COVID-19 pandemic period. Careful attention to patients safety was addressed throughout the evaluation. Evaluation and treatment management was initiated with disposition made efficiently and appropriate as possible to minimize any risk of potential exposure to patient during this evaluation.       Pedro Ryan,  MD  05/30/21 112

## 2021-05-29 NOTE — ED TRIAGE NOTES
Presents with concerns for sickle cell pain flare. Patient was seen yesterday for flare of her sickle cell disease. She was treated with occasions for symptomatic relief. She returns today with sickle cell pain flare focal in her lower back and sides. This pain is similar to prior sickle cell pain flares. C/o of new arm pain d/t to PIV infiltration yesterday. No CP, headache, fevers or leg pains. Tried oxycodone, OxyContin, Tylenol and Celebrex.  No signs of distressed noted.

## 2021-05-30 ASSESSMENT — ENCOUNTER SYMPTOMS
ARTHRALGIAS: 1
BRUISES/BLEEDS EASILY: 0
BACK PAIN: 1
VOMITING: 0
WEAKNESS: 1
APPETITE CHANGE: 1
DYSPHORIC MOOD: 1
SINUS PRESSURE: 0
NECK PAIN: 0
SORE THROAT: 0
CHEST TIGHTNESS: 0
DECREASED CONCENTRATION: 1
FATIGUE: 1
NAUSEA: 0
CHILLS: 0
ACTIVITY CHANGE: 1

## 2021-05-30 NOTE — DISCHARGE INSTRUCTIONS
Home.  Take home medications.  Follow up with your hematology clinic.  Return if any concerns.  Push fluids.

## 2021-06-01 ENCOUNTER — HOSPITAL ENCOUNTER (EMERGENCY)
Facility: CLINIC | Age: 21
Discharge: HOME | End: 2021-06-01
Attending: EMERGENCY MEDICINE | Admitting: EMERGENCY MEDICINE
Payer: COMMERCIAL

## 2021-06-01 ENCOUNTER — TELEPHONE (OUTPATIENT)
Dept: ONCOLOGY | Facility: CLINIC | Age: 21
End: 2021-06-01

## 2021-06-01 VITALS
SYSTOLIC BLOOD PRESSURE: 131 MMHG | BODY MASS INDEX: 28.68 KG/M2 | OXYGEN SATURATION: 100 % | HEIGHT: 64 IN | TEMPERATURE: 98.2 F | RESPIRATION RATE: 16 BRPM | DIASTOLIC BLOOD PRESSURE: 84 MMHG | WEIGHT: 168 LBS | HEART RATE: 103 BPM

## 2021-06-01 DIAGNOSIS — D57.00 SICKLE CELL PAIN CRISIS (H): ICD-10-CM

## 2021-06-01 LAB
ALBUMIN SERPL-MCNC: 3.8 G/DL (ref 3.4–5)
ALP SERPL-CCNC: 102 U/L (ref 40–150)
ALT SERPL W P-5'-P-CCNC: 136 U/L (ref 0–50)
ANION GAP SERPL CALCULATED.3IONS-SCNC: 6 MMOL/L (ref 3–14)
AST SERPL W P-5'-P-CCNC: 104 U/L (ref 0–45)
BASOPHILS # BLD AUTO: 0.2 10E9/L (ref 0–0.2)
BASOPHILS NFR BLD AUTO: 1.6 %
BILIRUB SERPL-MCNC: 2 MG/DL (ref 0.2–1.3)
BUN SERPL-MCNC: 9 MG/DL (ref 7–30)
CALCIUM SERPL-MCNC: 8.7 MG/DL (ref 8.5–10.1)
CHLORIDE SERPL-SCNC: 110 MMOL/L (ref 94–109)
CO2 SERPL-SCNC: 21 MMOL/L (ref 20–32)
CREAT SERPL-MCNC: 0.54 MG/DL (ref 0.52–1.04)
DIFFERENTIAL METHOD BLD: ABNORMAL
EOSINOPHIL # BLD AUTO: 1 10E9/L (ref 0–0.7)
EOSINOPHIL NFR BLD AUTO: 8.2 %
ERYTHROCYTE [DISTWIDTH] IN BLOOD BY AUTOMATED COUNT: 21.2 % (ref 10–15)
GFR SERPL CREATININE-BSD FRML MDRD: >90 ML/MIN/{1.73_M2}
GLUCOSE SERPL-MCNC: 90 MG/DL (ref 70–99)
HCG SERPL QL: NEGATIVE
HCT VFR BLD AUTO: 29 % (ref 35–47)
HGB BLD-MCNC: 9.5 G/DL (ref 11.7–15.7)
IMM GRANULOCYTES # BLD: 0.1 10E9/L (ref 0–0.4)
IMM GRANULOCYTES NFR BLD: 0.4 %
LYMPHOCYTES # BLD AUTO: 2.1 10E9/L (ref 0.8–5.3)
LYMPHOCYTES NFR BLD AUTO: 18 %
MCH RBC QN AUTO: 30.1 PG (ref 26.5–33)
MCHC RBC AUTO-ENTMCNC: 32.8 G/DL (ref 31.5–36.5)
MCV RBC AUTO: 92 FL (ref 78–100)
MONOCYTES # BLD AUTO: 0.6 10E9/L (ref 0–1.3)
MONOCYTES NFR BLD AUTO: 5 %
NEUTROPHILS # BLD AUTO: 7.9 10E9/L (ref 1.6–8.3)
NEUTROPHILS NFR BLD AUTO: 66.8 %
NRBC # BLD AUTO: 0.3 10*3/UL
NRBC BLD AUTO-RTO: 3 /100
PLATELET # BLD AUTO: 328 10E9/L (ref 150–450)
POTASSIUM SERPL-SCNC: 3.8 MMOL/L (ref 3.4–5.3)
PROT SERPL-MCNC: 8.2 G/DL (ref 6.8–8.8)
RBC # BLD AUTO: 3.16 10E12/L (ref 3.8–5.2)
SODIUM SERPL-SCNC: 138 MMOL/L (ref 133–144)
WBC # BLD AUTO: 11.9 10E9/L (ref 4–11)

## 2021-06-01 PROCEDURE — 96375 TX/PRO/DX INJ NEW DRUG ADDON: CPT | Performed by: EMERGENCY MEDICINE

## 2021-06-01 PROCEDURE — 96374 THER/PROPH/DIAG INJ IV PUSH: CPT | Performed by: EMERGENCY MEDICINE

## 2021-06-01 PROCEDURE — 258N000003 HC RX IP 258 OP 636: Performed by: EMERGENCY MEDICINE

## 2021-06-01 PROCEDURE — 99285 EMERGENCY DEPT VISIT HI MDM: CPT | Performed by: EMERGENCY MEDICINE

## 2021-06-01 PROCEDURE — 80053 COMPREHEN METABOLIC PANEL: CPT | Performed by: EMERGENCY MEDICINE

## 2021-06-01 PROCEDURE — 250N000011 HC RX IP 250 OP 636: Performed by: EMERGENCY MEDICINE

## 2021-06-01 PROCEDURE — 99285 EMERGENCY DEPT VISIT HI MDM: CPT | Mod: 25 | Performed by: EMERGENCY MEDICINE

## 2021-06-01 PROCEDURE — 250N000013 HC RX MED GY IP 250 OP 250 PS 637: Performed by: EMERGENCY MEDICINE

## 2021-06-01 PROCEDURE — 85025 COMPLETE CBC W/AUTO DIFF WBC: CPT | Performed by: EMERGENCY MEDICINE

## 2021-06-01 PROCEDURE — 96376 TX/PRO/DX INJ SAME DRUG ADON: CPT | Performed by: EMERGENCY MEDICINE

## 2021-06-01 PROCEDURE — 84703 CHORIONIC GONADOTROPIN ASSAY: CPT | Performed by: EMERGENCY MEDICINE

## 2021-06-01 PROCEDURE — 96361 HYDRATE IV INFUSION ADD-ON: CPT | Performed by: EMERGENCY MEDICINE

## 2021-06-01 RX ORDER — OXYCODONE HYDROCHLORIDE 15 MG/1
TABLET ORAL
Qty: 60 TABLET | Refills: 0 | Status: SHIPPED | OUTPATIENT
Start: 2021-06-01 | End: 2021-06-14

## 2021-06-01 RX ORDER — SODIUM CHLORIDE, SODIUM LACTATE, POTASSIUM CHLORIDE, CALCIUM CHLORIDE 600; 310; 30; 20 MG/100ML; MG/100ML; MG/100ML; MG/100ML
INJECTION, SOLUTION INTRAVENOUS CONTINUOUS
Status: DISCONTINUED | OUTPATIENT
Start: 2021-06-01 | End: 2021-06-01 | Stop reason: HOSPADM

## 2021-06-01 RX ORDER — LIDOCAINE 40 MG/G
CREAM TOPICAL
Status: DISCONTINUED | OUTPATIENT
Start: 2021-06-01 | End: 2021-06-01 | Stop reason: HOSPADM

## 2021-06-01 RX ORDER — KETOROLAC TROMETHAMINE 15 MG/ML
15 INJECTION, SOLUTION INTRAMUSCULAR; INTRAVENOUS ONCE
Status: DISCONTINUED | OUTPATIENT
Start: 2021-06-01 | End: 2021-06-01 | Stop reason: HOSPADM

## 2021-06-01 RX ORDER — MORPHINE SULFATE 2 MG/ML
2 INJECTION, SOLUTION INTRAMUSCULAR; INTRAVENOUS
Status: COMPLETED | OUTPATIENT
Start: 2021-06-01 | End: 2021-06-01

## 2021-06-01 RX ORDER — ONDANSETRON 2 MG/ML
8 INJECTION INTRAMUSCULAR; INTRAVENOUS ONCE
Status: COMPLETED | OUTPATIENT
Start: 2021-06-01 | End: 2021-06-01

## 2021-06-01 RX ORDER — DIPHENHYDRAMINE HCL 25 MG
25 CAPSULE ORAL ONCE
Status: COMPLETED | OUTPATIENT
Start: 2021-06-01 | End: 2021-06-01

## 2021-06-01 RX ORDER — HEPARIN SODIUM (PORCINE) LOCK FLUSH IV SOLN 100 UNIT/ML 100 UNIT/ML
5 SOLUTION INTRAVENOUS
Status: DISCONTINUED | OUTPATIENT
Start: 2021-06-01 | End: 2021-06-01 | Stop reason: HOSPADM

## 2021-06-01 RX ADMIN — SODIUM CHLORIDE, POTASSIUM CHLORIDE, SODIUM LACTATE AND CALCIUM CHLORIDE: 600; 310; 30; 20 INJECTION, SOLUTION INTRAVENOUS at 12:38

## 2021-06-01 RX ADMIN — SODIUM CHLORIDE, POTASSIUM CHLORIDE, SODIUM LACTATE AND CALCIUM CHLORIDE: 600; 310; 30; 20 INJECTION, SOLUTION INTRAVENOUS at 15:02

## 2021-06-01 RX ADMIN — MORPHINE SULFATE 2 MG: 2 INJECTION, SOLUTION INTRAMUSCULAR; INTRAVENOUS at 14:53

## 2021-06-01 RX ADMIN — MORPHINE SULFATE 2 MG: 2 INJECTION, SOLUTION INTRAMUSCULAR; INTRAVENOUS at 13:40

## 2021-06-01 RX ADMIN — ONDANSETRON 8 MG: 2 INJECTION INTRAMUSCULAR; INTRAVENOUS at 12:38

## 2021-06-01 RX ADMIN — MORPHINE SULFATE 2 MG: 2 INJECTION, SOLUTION INTRAMUSCULAR; INTRAVENOUS at 12:38

## 2021-06-01 RX ADMIN — DIPHENHYDRAMINE HYDROCHLORIDE 25 MG: 25 CAPSULE ORAL at 12:38

## 2021-06-01 RX ADMIN — Medication 5 ML: at 15:57

## 2021-06-01 ASSESSMENT — MIFFLIN-ST. JEOR: SCORE: 1507.04

## 2021-06-01 NOTE — TELEPHONE ENCOUNTER
Pt called back to check on infusion availability, informed her still no cancellations for ivf pain meds advised her to come to the ED if pain is not manageable at home on her pain meds. She stated she only has 4-5 tabs left of her Oxy IR 15 mg, requested refill to [facility] pharmacy. Teed up and routed to Andrei for approval.

## 2021-06-01 NOTE — DISCHARGE INSTRUCTIONS
Return to the emergency department if symptoms recur, there are any new symptoms or any cause for concern.

## 2021-06-01 NOTE — ED TRIAGE NOTES
Triage Assessment & Note:    Patient presents with: Sickle cell pain in lower back and flanks    Home Treatments/Remedies: prescribed meds     Febrile / Afebrile? Afebrile     Duration of C/o: 10hrs     Feliz Marquez RN  June 1, 2021

## 2021-06-01 NOTE — TELEPHONE ENCOUNTER
Pt called in to triage requesting IVF pain meds for low back and side pain rated 9/10 x10 hours. Stated last took prn Oxycotin,oxycodone,celebrex and tylenol around 6 am  this morning without relief. Denied any fevers, chills, cough, sob, chest pain or other symptoms. Pt's last infusion was 5/27, last clinic visit 5/26 with follow-up on 7/5 with Andrei HIGGINS. Pt denied being out of home medications and needing any refills today. Pt does not  qualify for sickle cell standing order protocol.    Ok for infusion per Andrei HIGGINS.

## 2021-06-01 NOTE — ED PROVIDER NOTES
[employer] EMERGENCY DEPARTMENT ([facility])  6/01/21 ED 10    History     Chief Complaint   Patient presents with     Sickle Cell Pain Crisis     The history is provided by the patient and medical records.     Jennifer Cervantes is a 21 year old female with history of sickle cell disease who presents with suspected sickle cell pain crisis.  Patient reports that she currently has pain in her back, and bilateral flanks.  She reports that this feels like her typical sickle cell pain crises.  She states that this episode had started approximately 10-11 hours ago.  She states that she had tried all of her home remedies, and medications with no resolution of her pain.  Patient states she then had to try to call the infusion center to get in for an appointment, but states that they were busy and she was unable to be seen by them.  Patient reports that it was fairly cold in her room last night, states that cold air will usually precipitate her sickle cell crises.  Patient denies any recent cough, cold, or other URI symptoms.  No other symptoms noted.    PAST MEDICAL HISTORY:   Past Medical History:   Diagnosis Date     Anxiety      Bleeding disorder (H)      Blood clotting disorder (H)      Cerebral infarction (H) 2015     Cognitive developmental delay     low IQ. 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 (H)     right hand contractures     Iron overload due to repeated red blood cell transfusions      Migraines      Multiple subsegmental pulmonary emboli without acute cor pulmonale (H) 02/01/2021     Oppositional defiant behavior      Superficial venous thrombosis of arm, right 03/25/2021     Uncomplicated asthma        PAST SURGICAL HISTORY:   Past Surgical History:   Procedure Laterality Date     AS INSERT TUNNELED CV 2 CATH W/O PORT/PUMP       C BREAST REDUCTION (INCLUDES LIPO) TIER 3  Bilateral 04/23/2019     CHOLECYSTECTOMY       INSERT PORT VASCULAR ACCESS Left 4/21/2021    Procedure: INSERTION, VASCULAR ACCESS PORT (NOT SURE ON SIDE UNTIL REMOVAL);  Surgeon: Rajan More MD;  Location: [facility] OR     IR CHEST PORT PLACEMENT > 5 YRS OF AGE  4/21/2021     IR CVC NON TUNNEL LINE REMOVAL  5/6/2021     IR CVC NON TUNNEL PLACEMENT  04/07/2020     IR PORT REMOVAL LEFT  4/21/2021     REMOVE PORT VASCULAR ACCESS Left 4/21/2021    Procedure: REMOVAL, VASCULAR ACCESS PORT LEFT;  Surgeon: Rajan More MD;  Location: [facility] OR     REPAIR TENDON ELBOW Right 10/02/2019    Procedure: Right Elbow Flexor Lengthening, Flexor Pronator Slide Of Wrist and Finger, Thumb Adductor Lengthening;  Surgeon: Anai Franco MD;  Location: [facility] OR     TONSILLECTOMY Bilateral 10/02/2019    Procedure: Bilateral Tonsillectomy;  Surgeon: Farhana Guy MD;  Location: [facility] OR       Past medical history, past surgical history, medications, and allergies were reviewed with the patient. Additional pertinent items: None    FAMILY HISTORY:   Family History   Problem Relation Age of Onset     Sickle Cell Trait Mother      Hypertension Mother      Asthma Mother      Sickle Cell Trait Father        SOCIAL HISTORY:   Social History     Tobacco Use     Smoking status: Never Smoker     Smokeless tobacco: Never Used   Substance Use Topics     Alcohol use: Not Currently     Alcohol/week: 0.0 standard drinks     Social history was reviewed with the patient. Additional pertinent items: None    Patient's Medications   New Prescriptions    No medications on file   Previous Medications    ACETAMINOPHEN (TYLENOL) 325 MG TABLET    Take 2 tablets (650 mg) by mouth every 6 hours as needed for mild pain    ALBUTEROL (PROAIR HFA/PROVENTIL HFA/VENTOLIN HFA) 108 (90 BASE) MCG/ACT INHALER    Inhale 2 puffs into the lungs every 6 hours as needed for shortness of breath / dyspnea or wheezing    ALBUTEROL (PROVENTIL) (2.5 MG/3ML) 0.083% NEB  SOLUTION    Take 1 vial (2.5 mg) by nebulization every 6 hours as needed for shortness of breath / dyspnea or wheezing    ARIPIPRAZOLE (ABILIFY) 2 MG TABLET    Take 1 tablet (2 mg) by mouth daily    BUDESONIDE-FORMOTEROL (SYMBICORT) 160-4.5 MCG/ACT INHALER    Inhale 1 puff into the lungs every evening    CELECOXIB (CELEBREX) 100 MG CAPSULE    Take 1 capsule (100 mg) by mouth 2 times daily    DICLOFENAC (VOLTAREN) 1 % TOPICAL GEL    Apply 4 g topically 4 times daily as needed for moderate pain Apply to back, legs, and arms for pain    DIPHENHYDRAMINE (BENADRYL) 25 MG CAPSULE    Take 1-2 capsules (25-50 mg) by mouth nightly as needed for sleep    EPINEPHRINE (ANY BX GENERIC EQUIV) 0.3 MG/0.3ML INJECTION 2-PACK    Inject 0.3 mLs (0.3 mg) into the muscle as needed for anaphylaxis    HYDROXYUREA 1000 MG TABS    Take 2,000 mg by mouth daily    HYDROXYZINE (ATARAX) 25 MG TABLET    Take 1 tablet (25 mg) by mouth 3 times daily as needed for anxiety    JADENU 360 MG TABLET    Take 4 tablets (1,440 mg) by mouth every evening    LIDOCAINE-PRILOCAINE (EMLA) 2.5-2.5 % EXTERNAL CREAM    Apply topically as needed for moderate pain    MEDROXYPROGESTERONE (DEPO-PROVERA) 150 MG/ML IM INJECTION    Inject 150 mg into the muscle    NALOXONE (NARCAN) 4 MG/0.1ML NASAL SPRAY    Spray 1 spray (4 mg) into one nostril alternating nostrils once as needed for opioid reversal every 2-3 minutes until assistance arrives    ONDANSETRON (ZOFRAN) 8 MG TABLET        OXYCODONE (OXYCONTIN) 10 MG 12 HR TABLET    Take 1 tablet (10 mg) by mouth every 12 hours    OXYCODONE IR (ROXICODONE) 15 MG TABLET    Take 1 tablet (15mg) by mouth every 4-6 hours as needed for severe pain. Goal 4 per day. Max 6 per day.    RIVAROXABAN ANTICOAGULANT (XARELTO ANTICOAGULANT) 20 MG TABS TABLET    Take 1 tablet (20 mg) by mouth daily (with dinner) Starting on 5/29 after completing your 15mg twice daily dosing on 5/28    RIVAROXABAN ANTICOAGULANT (XARELTO) 15 MG TABS TABLET     "Take 1 tablet (15 mg) by mouth 2 times daily (with meals) for 17 days    SERTRALINE (ZOLOFT) 100 MG TABLET    Take 2 tablets (200 mg) by mouth daily   Modified Medications    No medications on file   Discontinued Medications    No medications on file          Allergies   Allergen Reactions     Contrast Dye      Hives and breathing issues     Fish-Derived Products Hives     Seafood Hives     Diagnostic X-Ray Materials      Gadolinium        ROS: 14 point ROS neg other than the symptoms noted above in the HPI.      Physical Exam   BP: 133/75  Pulse: 112  Temp: 98.5  F (36.9  C)  Resp: 16  Height: 162.6 cm (5' 4\")  Weight: 76.2 kg (168 lb)  SpO2: 96 %      Physical Exam  Vitals signs and nursing note reviewed.   Constitutional:       General: She is not in acute distress.     Appearance: She is well-developed. She is not diaphoretic.   HENT:      Head: Normocephalic and atraumatic.      Mouth/Throat:      Pharynx: No oropharyngeal exudate.   Eyes:      General: No scleral icterus.        Right eye: No discharge.         Left eye: No discharge.      Pupils: Pupils are equal, round, and reactive to light.   Neck:      Musculoskeletal: Normal range of motion and neck supple.   Cardiovascular:      Rate and Rhythm: Normal rate and regular rhythm.      Heart sounds: Normal heart sounds. No murmur. No friction rub. No gallop.       Comments: Port in left upper chest.  Pulmonary:      Effort: Pulmonary effort is normal. No respiratory distress.      Breath sounds: Normal breath sounds. No wheezing.   Chest:      Chest wall: No tenderness.   Abdominal:      General: Bowel sounds are normal. There is no distension.      Palpations: Abdomen is soft.      Tenderness: There is no abdominal tenderness.   Musculoskeletal: Normal range of motion.         General: No tenderness or deformity.   Skin:     General: Skin is warm and dry.      Coloration: Skin is not pale.      Findings: No erythema or rash.   Neurological:      Mental " Status: She is alert and oriented to person, place, and time.      Cranial Nerves: No cranial nerve deficit.         ED Course   12:44 PM  The patient was seen and examined by Jitendra Brandon DO in Room ED10.        Procedures                          Labs Ordered and Resulted from Time of ED Arrival Up to the Time of Departure from the ED - No data to display         Assessments & Plan (with Medical Decision Making)   This is a 21-year-old female with a history of sickle cell disease who presents with pain.  She notes back and bilateral thigh pain consistent with previous episodes of sickle cell pain.  She believes this was precipitated by cold air.  Exam demonstrates no acute abnormalities.  Lab work shows no acute abnormalities.  Patient was given morphine, ketorolac, ondansetron, diphenhydramine, and IV fluids per her care plan.  On reevaluation patient had an improvement in symptoms. Patient was discharged home with return precautions.     I have reviewed the nursing notes.    I have reviewed the findings, diagnosis, plan and need for follow up with the patient.    New Prescriptions    No medications on file       Final diagnoses:   None     I, Evangelist Bradshaw, am serving as a trained medical scribe to document services personally performed by Jitendra Brandon DO, based on the provider's statements to me.      Jitendra NEWMAN DO, was physically present and have reviewed and verified the accuracy of this note documented by Evangelist Bradshaw.    6/1/2021   [facility] EMERGENCY DEPARTMENT       Jitendra Brandon DO  06/01/21 [id number]

## 2021-06-02 ENCOUNTER — HOSPITAL ENCOUNTER (EMERGENCY)
Facility: CLINIC | Age: 21
Discharge: LEFT BEFORE DISCHARGE | End: 2021-06-02
Payer: COMMERCIAL

## 2021-06-02 VITALS
OXYGEN SATURATION: 96 % | TEMPERATURE: 98.6 F | DIASTOLIC BLOOD PRESSURE: 76 MMHG | HEART RATE: 109 BPM | RESPIRATION RATE: 16 BRPM | SYSTOLIC BLOOD PRESSURE: 132 MMHG

## 2021-06-02 NOTE — PROGRESS NOTES
This is a recent snapshot of the patient's [facility] medical record.  For current drug dose and complete information and questions, call [phone] or In Basket pool, [facility] home infusion ([id number])  CSN Number:  [id number]

## 2021-06-03 ENCOUNTER — TELEPHONE (OUTPATIENT)
Dept: ONCOLOGY | Facility: CLINIC | Age: 21
End: 2021-06-03

## 2021-06-03 ENCOUNTER — HOSPITAL ENCOUNTER (EMERGENCY)
Facility: CLINIC | Age: 21
Discharge: HOME | End: 2021-06-03
Payer: COMMERCIAL

## 2021-06-03 VITALS
SYSTOLIC BLOOD PRESSURE: 138 MMHG | WEIGHT: 168 LBS | HEART RATE: 113 BPM | TEMPERATURE: 98.2 F | RESPIRATION RATE: 14 BRPM | BODY MASS INDEX: 28.68 KG/M2 | OXYGEN SATURATION: 97 % | HEIGHT: 64 IN | DIASTOLIC BLOOD PRESSURE: 75 MMHG

## 2021-06-03 PROCEDURE — 99285 EMERGENCY DEPT VISIT HI MDM: CPT | Mod: 25

## 2021-06-03 PROCEDURE — 99285 EMERGENCY DEPT VISIT HI MDM: CPT | Performed by: EMERGENCY MEDICINE

## 2021-06-03 ASSESSMENT — MIFFLIN-ST. JEOR
SCORE: 1507.04
SCORE: 1507.04

## 2021-06-03 NOTE — PROGRESS NOTES
This is a recent snapshot of the patient's [facility] medical record.  For current drug dose and complete information and questions, call [phone] or In Basket pool, [facility] home infusion ([id number])  CSN Number:  [id number]

## 2021-06-03 NOTE — ED TRIAGE NOTES
Coming in with sickle cell pain. Sharp right side abdominal pain. States she hasn't eaten in last day and half.

## 2021-06-03 NOTE — TELEPHONE ENCOUNTER
[facility] Triage Note    Patient called triage.  She is at ED now at the [facility] and the wait is about 4 hours.  She was hoping for other options but informed that we have no opening for infusion either.  She will wait.     Kelsey Santiago RN   BSN, [facility], [facility]  [facility] Triage

## 2021-06-04 ENCOUNTER — ONCOLOGY VISIT (OUTPATIENT)
Dept: ONCOLOGY | Facility: CLINIC | Age: 21
End: 2021-06-04
Attending: PEDIATRICS
Payer: COMMERCIAL

## 2021-06-04 ENCOUNTER — HOSPITAL ENCOUNTER (EMERGENCY)
Facility: CLINIC | Age: 21
Discharge: HOME | End: 2021-06-04
Attending: EMERGENCY MEDICINE | Admitting: EMERGENCY MEDICINE
Payer: COMMERCIAL

## 2021-06-04 ENCOUNTER — APPOINTMENT (OUTPATIENT)
Dept: CT IMAGING | Facility: CLINIC | Age: 21
End: 2021-06-04
Attending: EMERGENCY MEDICINE
Payer: COMMERCIAL

## 2021-06-04 ENCOUNTER — APPOINTMENT (OUTPATIENT)
Dept: LAB | Facility: CLINIC | Age: 21
End: 2021-06-04
Attending: PEDIATRICS
Payer: COMMERCIAL

## 2021-06-04 ENCOUNTER — TELEPHONE (OUTPATIENT)
Dept: ONCOLOGY | Facility: CLINIC | Age: 21
End: 2021-06-04

## 2021-06-04 VITALS
BODY MASS INDEX: 28.68 KG/M2 | SYSTOLIC BLOOD PRESSURE: 118 MMHG | RESPIRATION RATE: 16 BRPM | TEMPERATURE: 98.2 F | WEIGHT: 168 LBS | DIASTOLIC BLOOD PRESSURE: 65 MMHG | HEART RATE: 108 BPM | HEIGHT: 64 IN | OXYGEN SATURATION: 100 %

## 2021-06-04 VITALS
OXYGEN SATURATION: 95 % | TEMPERATURE: 98 F | SYSTOLIC BLOOD PRESSURE: 136 MMHG | DIASTOLIC BLOOD PRESSURE: 80 MMHG | BODY MASS INDEX: 28.6 KG/M2 | WEIGHT: 166.6 LBS | HEART RATE: 104 BPM

## 2021-06-04 DIAGNOSIS — D57.1 HB-SS DISEASE WITHOUT CRISIS (H): ICD-10-CM

## 2021-06-04 DIAGNOSIS — D57.00 SICKLE CELL CRISIS (H): Primary | ICD-10-CM

## 2021-06-04 DIAGNOSIS — D57.00 SICKLE CELL PAIN CRISIS (H): ICD-10-CM

## 2021-06-04 DIAGNOSIS — R10.31 ABDOMINAL PAIN, RIGHT LOWER QUADRANT: ICD-10-CM

## 2021-06-04 DIAGNOSIS — E83.111 IRON OVERLOAD DUE TO REPEATED RED BLOOD CELL TRANSFUSIONS: Primary | ICD-10-CM

## 2021-06-04 DIAGNOSIS — F32.A DEPRESSION, UNSPECIFIED DEPRESSION TYPE: ICD-10-CM

## 2021-06-04 DIAGNOSIS — F41.9 ANXIETY: ICD-10-CM

## 2021-06-04 LAB
ALBUMIN SERPL-MCNC: 4 G/DL (ref 3.4–5)
ALBUMIN UR-MCNC: NEGATIVE MG/DL
ALP SERPL-CCNC: 98 U/L (ref 40–150)
ALT SERPL W P-5'-P-CCNC: 98 U/L (ref 0–50)
ANION GAP SERPL CALCULATED.3IONS-SCNC: 5 MMOL/L (ref 3–14)
APPEARANCE UR: CLEAR
AST SERPL W P-5'-P-CCNC: 80 U/L (ref 0–45)
BASOPHILS # BLD AUTO: 0.2 10E9/L (ref 0–0.2)
BASOPHILS NFR BLD AUTO: 1.6 %
BILIRUB SERPL-MCNC: 2 MG/DL (ref 0.2–1.3)
BILIRUB UR QL STRIP: NEGATIVE
BUN SERPL-MCNC: 12 MG/DL (ref 7–30)
CALCIUM SERPL-MCNC: 8.9 MG/DL (ref 8.5–10.1)
CHLORIDE SERPL-SCNC: 107 MMOL/L (ref 94–109)
CO2 SERPL-SCNC: 24 MMOL/L (ref 20–32)
COLOR UR AUTO: NORMAL
CREAT SERPL-MCNC: 0.56 MG/DL (ref 0.52–1.04)
DIFFERENTIAL METHOD BLD: ABNORMAL
EOSINOPHIL # BLD AUTO: 1.3 10E9/L (ref 0–0.7)
EOSINOPHIL NFR BLD AUTO: 8.7 %
ERYTHROCYTE [DISTWIDTH] IN BLOOD BY AUTOMATED COUNT: 20.5 % (ref 10–15)
GFR SERPL CREATININE-BSD FRML MDRD: >90 ML/MIN/{1.73_M2}
GLUCOSE SERPL-MCNC: 90 MG/DL (ref 70–99)
GLUCOSE UR STRIP-MCNC: NEGATIVE MG/DL
HCG SERPL QL: NEGATIVE
HCT VFR BLD AUTO: 27.6 % (ref 35–47)
HGB BLD-MCNC: 9.4 G/DL (ref 11.7–15.7)
HGB UR QL STRIP: NEGATIVE
IMM GRANULOCYTES # BLD: 0.1 10E9/L (ref 0–0.4)
IMM GRANULOCYTES NFR BLD: 0.4 %
KETONES UR STRIP-MCNC: NEGATIVE MG/DL
LACTATE BLD-SCNC: 0.8 MMOL/L (ref 0.7–2)
LEUKOCYTE ESTERASE UR QL STRIP: NEGATIVE
LYMPHOCYTES # BLD AUTO: 3.4 10E9/L (ref 0.8–5.3)
LYMPHOCYTES NFR BLD AUTO: 22.3 %
MCH RBC QN AUTO: 30.1 PG (ref 26.5–33)
MCHC RBC AUTO-ENTMCNC: 34.1 G/DL (ref 31.5–36.5)
MCV RBC AUTO: 89 FL (ref 78–100)
MONOCYTES # BLD AUTO: 0.9 10E9/L (ref 0–1.3)
MONOCYTES NFR BLD AUTO: 5.8 %
NEUTROPHILS # BLD AUTO: 9.4 10E9/L (ref 1.6–8.3)
NEUTROPHILS NFR BLD AUTO: 61.2 %
NITRATE UR QL: NEGATIVE
NRBC # BLD AUTO: 0.1 10*3/UL
NRBC BLD AUTO-RTO: 1 /100
PH UR STRIP: 6 PH (ref 5–7)
PLATELET # BLD AUTO: 355 10E9/L (ref 150–450)
POTASSIUM SERPL-SCNC: 3.8 MMOL/L (ref 3.4–5.3)
PROT SERPL-MCNC: 8.8 G/DL (ref 6.8–8.8)
RBC # BLD AUTO: 3.12 10E12/L (ref 3.8–5.2)
RETICS # AUTO: 511.1 10E9/L (ref 25–95)
RETICS/RBC NFR AUTO: 16.4 % (ref 0.5–2)
SODIUM SERPL-SCNC: 135 MMOL/L (ref 133–144)
SOURCE: NORMAL
SP GR UR STRIP: 1.01 (ref 1–1.03)
UROBILINOGEN UR STRIP-MCNC: NORMAL MG/DL (ref 0–2)
WBC # BLD AUTO: 15.4 10E9/L (ref 4–11)

## 2021-06-04 PROCEDURE — 258N000003 HC RX IP 258 OP 636: Performed by: EMERGENCY MEDICINE

## 2021-06-04 PROCEDURE — 96361 HYDRATE IV INFUSION ADD-ON: CPT

## 2021-06-04 PROCEDURE — G0463 HOSPITAL OUTPT CLINIC VISIT: HCPCS

## 2021-06-04 PROCEDURE — 74176 CT ABD & PELVIS W/O CONTRAST: CPT

## 2021-06-04 PROCEDURE — 85045 AUTOMATED RETICULOCYTE COUNT: CPT | Performed by: EMERGENCY MEDICINE

## 2021-06-04 PROCEDURE — 250N000011 HC RX IP 250 OP 636: Performed by: EMERGENCY MEDICINE

## 2021-06-04 PROCEDURE — 81003 URINALYSIS AUTO W/O SCOPE: CPT | Performed by: EMERGENCY MEDICINE

## 2021-06-04 PROCEDURE — 85025 COMPLETE CBC W/AUTO DIFF WBC: CPT | Performed by: EMERGENCY MEDICINE

## 2021-06-04 PROCEDURE — 99213 OFFICE O/P EST LOW 20 MIN: CPT | Performed by: PEDIATRICS

## 2021-06-04 PROCEDURE — 74176 CT ABD & PELVIS W/O CONTRAST: CPT | Mod: 26 | Performed by: RADIOLOGY

## 2021-06-04 PROCEDURE — 96374 THER/PROPH/DIAG INJ IV PUSH: CPT

## 2021-06-04 PROCEDURE — 84703 CHORIONIC GONADOTROPIN ASSAY: CPT | Performed by: EMERGENCY MEDICINE

## 2021-06-04 PROCEDURE — 83605 ASSAY OF LACTIC ACID: CPT | Performed by: EMERGENCY MEDICINE

## 2021-06-04 PROCEDURE — 250N000013 HC RX MED GY IP 250 OP 250 PS 637: Performed by: EMERGENCY MEDICINE

## 2021-06-04 PROCEDURE — 80053 COMPREHEN METABOLIC PANEL: CPT | Performed by: EMERGENCY MEDICINE

## 2021-06-04 PROCEDURE — 96376 TX/PRO/DX INJ SAME DRUG ADON: CPT

## 2021-06-04 PROCEDURE — 96375 TX/PRO/DX INJ NEW DRUG ADDON: CPT

## 2021-06-04 RX ORDER — EPINEPHRINE 1 MG/ML
0.3 INJECTION, SOLUTION INTRAMUSCULAR; SUBCUTANEOUS EVERY 5 MIN PRN
Status: CANCELLED | OUTPATIENT
Start: 2021-07-02

## 2021-06-04 RX ORDER — DIPHENHYDRAMINE HYDROCHLORIDE 50 MG/ML
50 INJECTION INTRAMUSCULAR; INTRAVENOUS
Status: CANCELLED
Start: 2021-06-14

## 2021-06-04 RX ORDER — HEPARIN SODIUM,PORCINE 10 UNIT/ML
5 VIAL (ML) INTRAVENOUS
Status: CANCELLED | OUTPATIENT
Start: 2021-06-14

## 2021-06-04 RX ORDER — ARIPIPRAZOLE 2 MG/1
2 TABLET ORAL DAILY
Qty: 30 TABLET | Refills: 3 | Status: SHIPPED | OUTPATIENT
Start: 2021-06-04 | End: 2021-09-20

## 2021-06-04 RX ORDER — DEFERASIROX 360 MG/1
1440 TABLET, FILM COATED ORAL EVERY EVENING
Qty: 30 TABLET | Refills: 4 | Status: SHIPPED | OUTPATIENT
Start: 2021-06-04 | End: 2021-06-10

## 2021-06-04 RX ORDER — NALOXONE HYDROCHLORIDE 0.4 MG/ML
0.2 INJECTION, SOLUTION INTRAMUSCULAR; INTRAVENOUS; SUBCUTANEOUS
Status: CANCELLED | OUTPATIENT
Start: 2021-06-14

## 2021-06-04 RX ORDER — HEPARIN SODIUM (PORCINE) LOCK FLUSH IV SOLN 100 UNIT/ML 100 UNIT/ML
5 SOLUTION INTRAVENOUS
Status: CANCELLED | OUTPATIENT
Start: 2021-07-02

## 2021-06-04 RX ORDER — METHYLPREDNISOLONE SODIUM SUCCINATE 125 MG/2ML
125 INJECTION, POWDER, LYOPHILIZED, FOR SOLUTION INTRAMUSCULAR; INTRAVENOUS
Status: CANCELLED
Start: 2021-06-14

## 2021-06-04 RX ORDER — METHYLPREDNISOLONE SODIUM SUCCINATE 125 MG/2ML
125 INJECTION, POWDER, LYOPHILIZED, FOR SOLUTION INTRAMUSCULAR; INTRAVENOUS
Status: CANCELLED
Start: 2021-07-02

## 2021-06-04 RX ORDER — ALBUTEROL SULFATE 5 MG/ML
2.5 SOLUTION RESPIRATORY (INHALATION)
Status: CANCELLED | OUTPATIENT
Start: 2021-06-14

## 2021-06-04 RX ORDER — MEPERIDINE HYDROCHLORIDE 25 MG/ML
25 INJECTION INTRAMUSCULAR; INTRAVENOUS; SUBCUTANEOUS EVERY 30 MIN PRN
Status: CANCELLED | OUTPATIENT
Start: 2021-07-02

## 2021-06-04 RX ORDER — DIPHENHYDRAMINE HCL 50 MG
50 CAPSULE ORAL ONCE
Status: COMPLETED | OUTPATIENT
Start: 2021-06-04 | End: 2021-06-04

## 2021-06-04 RX ORDER — HEPARIN SODIUM (PORCINE) LOCK FLUSH IV SOLN 100 UNIT/ML 100 UNIT/ML
5 SOLUTION INTRAVENOUS
Status: DISCONTINUED | OUTPATIENT
Start: 2021-06-04 | End: 2021-06-04 | Stop reason: CLARIF

## 2021-06-04 RX ORDER — MEPERIDINE HYDROCHLORIDE 25 MG/ML
25 INJECTION INTRAMUSCULAR; INTRAVENOUS; SUBCUTANEOUS EVERY 30 MIN PRN
Status: CANCELLED | OUTPATIENT
Start: 2021-06-14

## 2021-06-04 RX ORDER — MORPHINE SULFATE 2 MG/ML
2 INJECTION, SOLUTION INTRAMUSCULAR; INTRAVENOUS
Status: COMPLETED | OUTPATIENT
Start: 2021-06-04 | End: 2021-06-04

## 2021-06-04 RX ORDER — ALBUTEROL SULFATE 5 MG/ML
2.5 SOLUTION RESPIRATORY (INHALATION)
Status: CANCELLED | OUTPATIENT
Start: 2021-07-02

## 2021-06-04 RX ORDER — HEPARIN SODIUM (PORCINE) LOCK FLUSH IV SOLN 100 UNIT/ML 100 UNIT/ML
5 SOLUTION INTRAVENOUS
Status: CANCELLED | OUTPATIENT
Start: 2021-06-14

## 2021-06-04 RX ORDER — SERTRALINE HYDROCHLORIDE 100 MG/1
200 TABLET, FILM COATED ORAL DAILY
Qty: 180 TABLET | Refills: 3 | Status: SHIPPED | OUTPATIENT
Start: 2021-06-04 | End: 2021-08-24

## 2021-06-04 RX ORDER — ONDANSETRON 2 MG/ML
4 INJECTION INTRAMUSCULAR; INTRAVENOUS EVERY 30 MIN PRN
Status: DISCONTINUED | OUTPATIENT
Start: 2021-06-04 | End: 2021-06-04 | Stop reason: HOSPADM

## 2021-06-04 RX ORDER — HEPARIN SODIUM (PORCINE) LOCK FLUSH IV SOLN 100 UNIT/ML 100 UNIT/ML
5 SOLUTION INTRAVENOUS ONCE
Status: COMPLETED | OUTPATIENT
Start: 2021-06-04 | End: 2021-06-04

## 2021-06-04 RX ORDER — NALOXONE HYDROCHLORIDE 0.4 MG/ML
0.2 INJECTION, SOLUTION INTRAMUSCULAR; INTRAVENOUS; SUBCUTANEOUS
Status: CANCELLED | OUTPATIENT
Start: 2021-07-02

## 2021-06-04 RX ORDER — HEPARIN SODIUM (PORCINE) LOCK FLUSH IV SOLN 100 UNIT/ML 100 UNIT/ML
5 SOLUTION INTRAVENOUS
Status: CANCELLED | OUTPATIENT
Start: 2021-06-04

## 2021-06-04 RX ORDER — ALBUTEROL SULFATE 90 UG/1
1-2 AEROSOL, METERED RESPIRATORY (INHALATION)
Status: CANCELLED
Start: 2021-07-02

## 2021-06-04 RX ORDER — HEPARIN SODIUM,PORCINE 10 UNIT/ML
5 VIAL (ML) INTRAVENOUS
Status: CANCELLED | OUTPATIENT
Start: 2021-07-02

## 2021-06-04 RX ORDER — KETOROLAC TROMETHAMINE 15 MG/ML
15 INJECTION, SOLUTION INTRAMUSCULAR; INTRAVENOUS ONCE
Status: COMPLETED | OUTPATIENT
Start: 2021-06-04 | End: 2021-06-04

## 2021-06-04 RX ORDER — DIPHENHYDRAMINE HYDROCHLORIDE 50 MG/ML
50 INJECTION INTRAMUSCULAR; INTRAVENOUS
Status: CANCELLED
Start: 2021-07-02

## 2021-06-04 RX ORDER — ALBUTEROL SULFATE 90 UG/1
1-2 AEROSOL, METERED RESPIRATORY (INHALATION)
Status: CANCELLED
Start: 2021-06-14

## 2021-06-04 RX ORDER — EPINEPHRINE 1 MG/ML
0.3 INJECTION, SOLUTION INTRAMUSCULAR; SUBCUTANEOUS EVERY 5 MIN PRN
Status: CANCELLED | OUTPATIENT
Start: 2021-06-14

## 2021-06-04 RX ADMIN — KETOROLAC TROMETHAMINE 15 MG: 15 INJECTION, SOLUTION INTRAMUSCULAR; INTRAVENOUS at 01:28

## 2021-06-04 RX ADMIN — MORPHINE SULFATE 2 MG: 2 INJECTION, SOLUTION INTRAMUSCULAR; INTRAVENOUS at 01:42

## 2021-06-04 RX ADMIN — MORPHINE SULFATE 2 MG: 2 INJECTION, SOLUTION INTRAMUSCULAR; INTRAVENOUS at 04:14

## 2021-06-04 RX ADMIN — Medication 5 ML: at 05:34

## 2021-06-04 RX ADMIN — MORPHINE SULFATE 2 MG: 2 INJECTION, SOLUTION INTRAMUSCULAR; INTRAVENOUS at 02:45

## 2021-06-04 RX ADMIN — SODIUM CHLORIDE, POTASSIUM CHLORIDE, SODIUM LACTATE AND CALCIUM CHLORIDE 1000 ML: 600; 310; 30; 20 INJECTION, SOLUTION INTRAVENOUS at 01:27

## 2021-06-04 RX ADMIN — DIPHENHYDRAMINE HYDROCHLORIDE 50 MG: 50 CAPSULE ORAL at 01:29

## 2021-06-04 ASSESSMENT — ENCOUNTER SYMPTOMS
HEADACHES: 0
VOMITING: 0
ABDOMINAL DISTENTION: 0
COLOR CHANGE: 0
COUGH: 0
PALPITATIONS: 0
SHORTNESS OF BREATH: 0
DIZZINESS: 0
NAUSEA: 0
ABDOMINAL PAIN: 1
SORE THROAT: 0
FEVER: 0
BACK PAIN: 1
HEMATURIA: 0
ARTHRALGIAS: 0
CONFUSION: 0
NECK PAIN: 0
CHILLS: 0
DYSURIA: 0
CHEST TIGHTNESS: 0
WEAKNESS: 0
MYALGIAS: 0
CONSTIPATION: 0
FATIGUE: 0
FREQUENCY: 0
EYE PAIN: 0
DIFFICULTY URINATING: 0
DIARRHEA: 0

## 2021-06-04 NOTE — PROGRESS NOTES
Sickle Cell Outpatient Follow Up Visit      Date of encounter: Jun 4, 2021    Jennifer Cervantes is a 21 year old female who is being seen in clinic for hospital follow up and health maintenance related to SCD      Interval History: Jennifer continues to have recurrent pain issues. In the last 2 weeks, she has come several times to the ED, though she has generally been able to stay out of the hospital. Multiple times, she has come in and found it too full to stick around. One time this week she just stayed home while another time she came back in the early morning hours. She went back again this morning. She was having some right sided abdominal pain today though CT was negative for appendicitis. This was atypical for her but was not present during this visit (~7-8 hours after she left the ED).     She is trying everything she can do at home to avoid coming in since she feels a significant lack of respect in the last several times in the ED. She had incontinence issues again and did not get timely help. One time she was trying to get to the ED and her IV came out. Since she is on Xarelto, she had a lot of extra bleeding. This is on top of waiting for ~2 hours even with relatively empty waiting rooms. She is very frustrated by this lack of respect and is becoming resistant to coming back to the [facility] ED.    Her pain has primarily been in her back and legs. No fevers. No dyspnea. The Desferal infusions are going very well per her recall but she is having a lot of issues with delivery of Jadenu so she has not had it in several weeks. She is back in her mom's house again, which is stressful but she is just trying to stay out of things altogether, pledging to move out again at some point.       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 She also has significant anxiety and depression with a strong anxiety about transferring to the adult clinic. She usually has pain crises secondary to the anxiety.      --------------------------------  Plan last reviewed with patient: 3/26/2021    Patient background: 20yo F, enjoys movies and kids though there are times where she does not really want to talk to people. Does not have a lot of social support at home.     Sickle Cell Disease History  Primary Hematologist: Perla  PCP: Raul  Genotype: SS  Acute Pain Crisis Treatment:    ER/Acute Care/Infusion Clinic:   o Morphine 2 mg IVP/SC Q1H X 3 doses  o Toradol x1 (avoid through early May due to being on anticoagulation)  o Maintenance IV fluids with LR  o Other: Benadryl PO and Zofran 8 mg IV PRN itching or nausea    Inpatient:  o Opioid: Morphine 2 mg IV Q1H PRN until PCA starts  o PCA plan:   - PCA button dose: Morphine 1 mg  - Lockout: 20 minutes  - Continuous Infusion: consider 1 mg/hr  - One hr max: 4 mg  o Other Medications: Benadryl, Zofran  o If PCA not working, she Has had success with ketamine starting at 4mg/h and advancing only to 6mg/h, as 8mg/h made her feel quite poorly.  o ASA on hold (ok to give celebrex)  o Supportive Care: Docusate, Senna  Chronic Pain Medications:    Home regimen Oxycontin 10 mg q12h, oxycodone 10 mg p.o. q.6 hours p.r.n. breakthrough pain.  She also continues on Celebrex, and Zoloft among other medications.    -Also benefits from mental health visits, acupuncture  Baseline Hemoglobin: 7 g/dl without chronic transfusions  Hydroxyurea use: Yes  H/O blood transfusions: Yes, several (iron overload) Most recent 8/21/20    H/O Transfusion Reactions: no    Antibodies:none  H/O Acute Chest Syndrome: Yes    Last episode: 10/2019     ICU/intubation: unsure  H/O Stroke: Yes  (managed with chronic transfusions in the past)  H/O VTE: Yes (2/2021)  H/O Cholecystectomy or Splenectomy: no  H/O Asthma, Pulm HTN, AVN, Leg Ulcers, Nephropathy, Retinopathy, etc: Iron overload, asthma, chronic lung disease, mild developmental delay from early stroke    -------------------------------------------    Sickle Cell Disease Comprehensive Checklist    Bone Health/Avascular Necrosis Screening/Symptoms (each visit): no new concerns today    Leg Ulcer evaluation (every visit): none    Hypertension (every visit): mildly hypertensive recently but improved later in evening and previous day     Last urinalysis for microalbuminuria/CKD (annually): within last year    Last pulmonary evaluation (asthma, AMAN, pulm HTN): within last year    Stroke/silent cerebral infarct Hx (Y/N): Yes TIA ~2014, first event ~age 1 with full stroke and R sided weakness    Last PCP Visit: 8/2020    Vaccines:  o PCV13: 5/13/19  o Pneumovax (PPSV23): 3/04, 10/09, 7/12/19 (next due 7/2024)  o Menactra: 4/2010, 9/2015 (MCV overdue Sept 2020)  o Influenza: got 20-21 vaccine per self report    Audiology (chelation): done 6/2020, normal    Past Medical History:  Past Medical History:   Diagnosis Date     Anxiety      Bleeding disorder (H)      Blood clotting disorder (H)      Cerebral infarction (H) 2015     Cognitive developmental delay     low IQ. 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 (H)     right hand contractures     Iron overload due to repeated red blood cell transfusions      Migraines      Multiple subsegmental pulmonary emboli without acute cor pulmonale (H) 02/01/2021     Oppositional defiant behavior      Superficial venous thrombosis of arm, right 03/25/2021     Uncomplicated asthma        Past Surgical History:  Past Surgical History:   Procedure Laterality Date     AS INSERT TUNNELED CV 2  CATH W/O PORT/PUMP       C BREAST REDUCTION (INCLUDES LIPO) TIER 3 Bilateral 04/23/2019     CHOLECYSTECTOMY       INSERT PORT VASCULAR ACCESS Left 4/21/2021    Procedure: INSERTION, VASCULAR ACCESS PORT (NOT SURE ON SIDE UNTIL REMOVAL);  Surgeon: Rajan More MD;  Location: [facility] OR     IR CHEST PORT PLACEMENT > 5 YRS OF AGE  4/21/2021     IR CVC NON TUNNEL LINE REMOVAL  5/6/2021     IR CVC NON TUNNEL PLACEMENT  04/07/2020     IR PORT REMOVAL LEFT  4/21/2021     REMOVE PORT VASCULAR ACCESS Left 4/21/2021    Procedure: REMOVAL, VASCULAR ACCESS PORT LEFT;  Surgeon: Rajan More MD;  Location: [facility] OR     REPAIR TENDON ELBOW Right 10/02/2019    Procedure: Right Elbow Flexor Lengthening, Flexor Pronator Slide Of Wrist and Finger, Thumb Adductor Lengthening;  Surgeon: Anai Franco MD;  Location: [facility] OR     TONSILLECTOMY Bilateral 10/02/2019    Procedure: Bilateral Tonsillectomy;  Surgeon: Farhana Guy MD;  Location: [facility] OR       Family History:   Family History   Problem Relation Age of Onset     Sickle Cell Trait Mother      Hypertension Mother      Asthma Mother      Sickle Cell Trait Father        Social History:  Social History     Socioeconomic History     Marital status: Single     Spouse name: Not on file     Number of children: Not on file     Years of education: Not on file     Highest education level: Not on file   Occupational History     Not on file   Social Needs     Financial resource strain: Not on file     Food insecurity     Worry: Not on file     Inability: Not on file     Transportation needs     Medical: Not on file     Non-medical: Not on file   Tobacco Use     Smoking status: Never Smoker     Smokeless tobacco: Never Used   Substance and Sexual Activity     Alcohol use: Not Currently     Alcohol/week: 0.0 standard drinks     Drug use: Never     Sexual activity: Not Currently     Partners: Male     Birth control/protection: Other   Lifestyle     Physical activity     Days per  "week: Not on file     Minutes per session: Not on file     Stress: Not on file   Relationships     Social connections     Talks on phone: Not on file     Gets together: Not on file     Attends Sabianist service: Not on file     Active member of club or organization: Not on file     Attends meetings of clubs or organizations: Not on file     Relationship status: Not on file     Intimate partner violence     Fear of current or ex partner: Not on file     Emotionally abused: Not on file     Physically abused: Not on file     Forced sexual activity: Not on file   Other Topics Concern     Parent/sibling w/ CABG, MI or angioplasty before 65F 55M? Not Asked   Social History Narrative    Lives with mom and extended family but \"toxic environment\" per her report. She would like to move out but cannot financially do so. She has minimal support at home despite her significant SCD comorbidities and cognitive delay from stroke.       Medications:  Current Outpatient Medications   Medication     acetaminophen (TYLENOL) 325 MG tablet     albuterol (PROAIR HFA/PROVENTIL HFA/VENTOLIN HFA) 108 (90 Base) MCG/ACT inhaler     albuterol (PROVENTIL) (2.5 MG/3ML) 0.083% neb solution     ARIPiprazole (ABILIFY) 2 MG tablet     budesonide-formoterol (SYMBICORT) 160-4.5 MCG/ACT Inhaler     diclofenac (VOLTAREN) 1 % topical gel     diphenhydrAMINE (BENADRYL) 25 MG capsule     EPINEPHrine (ANY BX GENERIC EQUIV) 0.3 MG/0.3ML injection 2-pack     Hydroxyurea 1000 MG TABS     hydrOXYzine (ATARAX) 25 MG tablet     JADENU 360 MG tablet     lidocaine-prilocaine (EMLA) 2.5-2.5 % external cream     naloxone (NARCAN) 4 MG/0.1ML nasal spray     ondansetron (ZOFRAN) 8 MG tablet     oxyCODONE (OXYCONTIN) 10 MG 12 hr tablet     oxyCODONE IR (ROXICODONE) 15 MG tablet     rivaroxaban ANTICOAGULANT (XARELTO ANTICOAGULANT) 20 MG TABS tablet     sertraline (ZOLOFT) 100 MG tablet     medroxyPROGESTERone (DEPO-PROVERA) 150 MG/ML IM injection     Current " Facility-Administered Medications   Medication     medroxyPROGESTERone (DEPO-PROVERA) syringe 150 mg         Physical Exam:   /80   Pulse 104   Temp 98  F (36.7  C) (Oral)   Wt 75.6 kg (166 lb 9.6 oz)   SpO2 95%   BMI 28.60 kg/m       GEN: young  female, appears comfortable in clinic, frustrated and tired  HEENT: no icterus, MMM  CV: RRR, no murmurs  NECK: no visible asymmetry  RESP: speaking in full sentences, no increased work of breathing, clear to auscultation  SKIN: no bruising noted  MSK: contracture at right wrist (chronic), now with slight edema compared to previous visits but non painful to touch  NEURO: alert and oriented      Labs:   Results for HIMANSHU AL (MRN [id number]) as of 6/4/2021 14:23   Ref. Range 6/4/2021 01:24 6/4/2021 04:10   Sodium Latest Ref Range: 133 - 144 mmol/L 135    Potassium Latest Ref Range: 3.4 - 5.3 mmol/L 3.8    Chloride Latest Ref Range: 94 - 109 mmol/L 107    Carbon Dioxide Latest Ref Range: 20 - 32 mmol/L 24    Urea Nitrogen Latest Ref Range: 7 - 30 mg/dL 12    Creatinine Latest Ref Range: 0.52 - 1.04 mg/dL 0.56    GFR Estimate Latest Ref Range: >60 mL/min/1.73_m2 >90    GFR Estimate If Black Latest Ref Range: >60 mL/min/1.73_m2 >90    Calcium Latest Ref Range: 8.5 - 10.1 mg/dL 8.9    Anion Gap Latest Ref Range: 3 - 14 mmol/L 5    Albumin Latest Ref Range: 3.4 - 5.0 g/dL 4.0    Protein Total Latest Ref Range: 6.8 - 8.8 g/dL 8.8    Bilirubin Total Latest Ref Range: 0.2 - 1.3 mg/dL 2.0 (H)    Alkaline Phosphatase Latest Ref Range: 40 - 150 U/L 98    ALT Latest Ref Range: 0 - 50 U/L 98 (H)    AST Latest Ref Range: 0 - 45 U/L 80 (H)    HCG Qualitative Serum Latest Ref Range: NEG^Negative  Negative    Lactate for Sepsis Protocol Latest Ref Range: 0.7 - 2.0 mmol/L  0.8   Glucose Latest Ref Range: 70 - 99 mg/dL 90    WBC Latest Ref Range: 4.0 - 11.0 10e9/L 15.4 (H)    Hemoglobin Latest Ref Range: 11.7 - 15.7 g/dL 9.4 (L)    Hematocrit Latest Ref Range:  35.0 - 47.0 % 27.6 (L)    Platelet Count Latest Ref Range: 150 - 450 10e9/L 355    RBC Count Latest Ref Range: 3.8 - 5.2 10e12/L 3.12 (L)    MCV Latest Ref Range: 78 - 100 fl 89    MCH Latest Ref Range: 26.5 - 33.0 pg 30.1    MCHC Latest Ref Range: 31.5 - 36.5 g/dL 34.1    RDW Latest Ref Range: 10.0 - 15.0 % 20.5 (H)    Diff Method Unknown Automated Method    % Neutrophils Latest Units: % 61.2    % Lymphocytes Latest Units: % 22.3    % Monocytes Latest Units: % 5.8    % Eosinophils Latest Units: % 8.7    % Basophils Latest Units: % 1.6    % Immature Granulocytes Latest Units: % 0.4    Nucleated RBCs Latest Ref Range: 0 /100 1 (H)    Absolute Neutrophil Latest Ref Range: 1.6 - 8.3 10e9/L 9.4 (H)    Absolute Lymphocytes Latest Ref Range: 0.8 - 5.3 10e9/L 3.4    Absolute Monocytes Latest Ref Range: 0.0 - 1.3 10e9/L 0.9    Absolute Eosinophils Latest Ref Range: 0.0 - 0.7 10e9/L 1.3 (H)    Absolute Basophils Latest Ref Range: 0.0 - 0.2 10e9/L 0.2    Abs Immature Granulocytes Latest Ref Range: 0 - 0.4 10e9/L 0.1    Absolute Nucleated RBC Unknown 0.1    % Retic Latest Ref Range: 0.5 - 2.0 % 16.4 (H)    Absolute Retic Latest Ref Range: 25 - 95 10e9/L 511.1 (H)    Color Urine Unknown  Light Yellow   Appearance Urine Unknown  Clear   Glucose Urine Latest Ref Range: NEG^Negative mg/dL  Negative   Bilirubin Urine Latest Ref Range: NEG^Negative   Negative   Ketones Urine Latest Ref Range: NEG^Negative mg/dL  Negative   Specific Gravity Urine Latest Ref Range: 1.003 - 1.035   1.012   pH Urine Latest Ref Range: 5.0 - 7.0 pH  6.0   Protein Albumin Urine Latest Ref Range: NEG^Negative mg/dL  Negative   Urobilinogen mg/dL Latest Ref Range: 0.0 - 2.0 mg/dL  Normal   Nitrite Urine Latest Ref Range: NEG^Negative   Negative   Blood Urine Latest Ref Range: NEG^Negative   Negative   Leukocyte Esterase Urine Latest Ref Range: NEG^Negative   Negative   Source Unknown  Midstream Urine       Imaging:   CT abdomen negative for  appendicitis    [facility] 3/25/21  Average liver iron concentration is 43 mg/g dry tissue (normal = 0.17 - 1.8)  Average liver iron concentration is 770 mmol/kg dry tissue (normal = 3 - 33)     Other findings: Diffuse hypodensity of  liver and spleen, consistent with secondary hemochromatosis.     ----------    Assessment:  Jennifer Cervantes is a 21 year old female with HbSS. Her disease has been complicated by stroke leading to significant cognitive delay and right sided hemiparesis, high anxiety leading to frequent pain crises on top of chronic pain syndrome, poor social structure at home with no real support, and iron overload secondary to repeated transfusions. She has had significant stressors at home and her 2021 has been marked by a long admission and separately, PE and SVT + DVT in RUE of unclear chronicity. She also has clear evidence of massive iron overload, which was suspected but is requiring urgent intervention.    Major Topics of the Visit:  1. Pain Management: We will maintain the current strategy of Oxycodone PRN and Oxycontin 10 mg q12h. NSAIDs and ASA on hold while on apixaban. I am going to stop Celebrex given a hypothetical concern of unopposed COX1 activity in a chronic setting leading to vasoconstriction. Mental stress is also important here and we need to maintain a support structure and she can engage with her counselor. SW has engaged with her on this    -Crizanlizumab to start 7/14  2. Iron overload/Pharmacy Issues: She had Jadenu ordered a few months ago but there have been difficulties getting it at refill time. She is now on Desferal as well. LIC was 43 (3/2021) so we need to act urgently. Desferal HD over 48 hours is going well. Jawill hopefully start next weekend with new Port. Her elevated AST/ALT may be associated with the iron and/or Jadenu but are improved today. We will monitor for now without medication modifications. Synthetic function for the liver seems to be intact  Repeat  Brenda in July (ordered)  3. High RBC turnover: Jennifer really has a high degree of RBC turnover, more than most patients I have seen. Oxybryta led to more frequent pain episodes (chest pain, which was new) and did not change the RBC turnover so it was stopped in December. No change currently  4. Pulmonary Emboli + new RUE DVT (innominate vein) of unclear chronicity + new symptomatic R cephalic SVT this week: Switched to apixaban with full starter pack given the new clots. No obvious trigger. I will treat the SVT primarily given the symptoms, and the timing comes out for 6 weeks to be around the time she would have finished the rivaroxaban. Treating through 7/27. Holding aspirin while on anticoagulation  5. Stroke sequelae on right side: I will reach out to PM&R to see if they may be able to better assist with her weakness. I do not think a surgical approach is warranted at this point. This visit has not yet occurred  6. Mental Health: Refilled arapiprazole and sertraline. Will try to get her re-initiated with Dr. Carter. Some of it is related to treatment in ED. I have now added documentation specifically mentioning the incontinence into the Care Coordination note.  Follow up: 2 weeks for Jr.      Review of external notes as documented above   Brenda ordered  Review of the result(s) of each unique test - I did not order labs for this visit since labs done this morning  Diagnosis or treatment significantly limited by social determinants of health - sickle cell disease, racial inequity, difficult social situation at home    30 min spent on the date of the encounter in chart review, patient visit, review of tests, documentation and/or discussion with other providers about the issues documented above.       Eric Duncan MD  Hematologist  Division of Hematology, Oncology, and Transplantation  [facility]  [facility]  Pager: [phone]

## 2021-06-04 NOTE — TELEPHONE ENCOUNTER
PA Initiation    Medication: Jadenu - Submitted  Insurance Company: [employer] - Phone [phone] Fax [phone]  Pharmacy Filling the Rx:    Filling Pharmacy Phone:    Filling Pharmacy Fax:    Start Date: 6/4/2021        Caron Martinez CPhT  [facility]  Oncology Pharmacy Liaison  [email or web address]  Phone: [phone]  Fax: [phone]

## 2021-06-04 NOTE — PATIENT INSTRUCTIONS
Jennifer, I am going to have you stop the Celebrex for now. Let me know when you need refills on pain medications. I am also going to reach out to Dr. Carter to see if we can get you in before month's end.

## 2021-06-04 NOTE — ED PROVIDER NOTES
ED Provider Note  [facility]      History     Chief Complaint   Patient presents with     Sickle Cell Pain Crisis     The history is provided by the patient.     Jennifer Cervantes is a 21 year old female with a medical history significant for sickle cell disease who presents to the Emergency Department for evaluation of lower back pain that feels like her typical sickle cell pain crisis as well as new right-sided abdominal pain.  The patient reports that her pain started 3 days ago.  She states that her lower back pain feels like her typical sickle cell pain crisis pain; however, she has been having intermittent right-sided sharp abdominal pain which is new for her.  She states that when she is having the pain it is exacerbated with certain movements.  She is unsure what is causing this abdominal pain.  She denies any fevers, chills, nausea, vomiting, urinary symptoms, pelvic pain, vaginal bleeding or vaginal discharge.  Patient denies any past abdominal surgeries.  She reports that she has been drinking fluids and eating normally.    Patient reports that she attempted to come to the Emergency Department the last 2 days for this pain, but both times it was full and she ended up leaving before being seen.    Past Medical History  Past Medical History:   Diagnosis Date     Anxiety      Bleeding disorder (H)      Blood clotting disorder (H)      Cerebral infarction (H) 2015     Cognitive developmental delay     low IQ. 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 (H)     right hand contractures     Iron overload due to repeated red blood cell transfusions      Migraines      Multiple subsegmental pulmonary emboli without acute cor pulmonale (H) 02/01/2021     Oppositional defiant behavior      Superficial venous thrombosis of arm, right 03/25/2021     Uncomplicated  asthma      Past Surgical History:   Procedure Laterality Date     AS INSERT TUNNELED CV 2 CATH W/O PORT/PUMP       C BREAST REDUCTION (INCLUDES LIPO) TIER 3 Bilateral 04/23/2019     CHOLECYSTECTOMY       INSERT PORT VASCULAR ACCESS Left 4/21/2021    Procedure: INSERTION, VASCULAR ACCESS PORT (NOT SURE ON SIDE UNTIL REMOVAL);  Surgeon: Rajan More MD;  Location: [facility] OR     IR CHEST PORT PLACEMENT > 5 YRS OF AGE  4/21/2021     IR CVC NON TUNNEL LINE REMOVAL  5/6/2021     IR CVC NON TUNNEL PLACEMENT  04/07/2020     IR PORT REMOVAL LEFT  4/21/2021     REMOVE PORT VASCULAR ACCESS Left 4/21/2021    Procedure: REMOVAL, VASCULAR ACCESS PORT LEFT;  Surgeon: Rajan More MD;  Location: [facility] OR     REPAIR TENDON ELBOW Right 10/02/2019    Procedure: Right Elbow Flexor Lengthening, Flexor Pronator Slide Of Wrist and Finger, Thumb Adductor Lengthening;  Surgeon: Anai Franco MD;  Location: [facility] OR     TONSILLECTOMY Bilateral 10/02/2019    Procedure: Bilateral Tonsillectomy;  Surgeon: Farhana Guy MD;  Location: [facility] OR     acetaminophen (TYLENOL) 325 MG tablet  albuterol (PROAIR HFA/PROVENTIL HFA/VENTOLIN HFA) 108 (90 Base) MCG/ACT inhaler  albuterol (PROVENTIL) (2.5 MG/3ML) 0.083% neb solution  ARIPiprazole (ABILIFY) 2 MG tablet  budesonide-formoterol (SYMBICORT) 160-4.5 MCG/ACT Inhaler  diclofenac (VOLTAREN) 1 % topical gel  diphenhydrAMINE (BENADRYL) 25 MG capsule  EPINEPHrine (ANY BX GENERIC EQUIV) 0.3 MG/0.3ML injection 2-pack  Hydroxyurea 1000 MG TABS  hydrOXYzine (ATARAX) 25 MG tablet  JADENU 360 MG tablet  lidocaine-prilocaine (EMLA) 2.5-2.5 % external cream  medroxyPROGESTERone (DEPO-PROVERA) 150 MG/ML IM injection  naloxone (NARCAN) 4 MG/0.1ML nasal spray  ondansetron (ZOFRAN) 8 MG tablet  oxyCODONE (OXYCONTIN) 10 MG 12 hr tablet  oxyCODONE IR (ROXICODONE) 15 MG tablet  rivaroxaban ANTICOAGULANT (XARELTO ANTICOAGULANT) 20 MG TABS tablet  sertraline (ZOLOFT) 100 MG tablet      Allergies  "  Allergen Reactions     Contrast Dye      Hives and breathing issues     Fish-Derived Products Hives     Seafood Hives     Diagnostic X-Ray Materials      Gadolinium      Family History  Family History   Problem Relation Age of Onset     Sickle Cell Trait Mother      Hypertension Mother      Asthma Mother      Sickle Cell Trait Father      Social History   Social History     Tobacco Use     Smoking status: Never Smoker     Smokeless tobacco: Never Used   Substance Use Topics     Alcohol use: Not Currently     Alcohol/week: 0.0 standard drinks     Drug use: Never      Past medical history, past surgical history, medications, allergies, family history, and social history were reviewed with the patient. No additional pertinent items.       Review of Systems   Constitutional: Negative for chills, fatigue and fever.   HENT: Negative for congestion and sore throat.    Eyes: Negative for pain and visual disturbance.   Respiratory: Negative for cough, chest tightness and shortness of breath.    Cardiovascular: Negative for chest pain and palpitations.   Gastrointestinal: Positive for abdominal pain (right sided). Negative for abdominal distention, constipation, diarrhea, nausea and vomiting.   Genitourinary: Negative for decreased urine volume, difficulty urinating, dysuria, frequency, hematuria, pelvic pain, urgency, vaginal bleeding and vaginal discharge.   Musculoskeletal: Positive for back pain (lower). Negative for arthralgias, myalgias and neck pain.   Skin: Negative for color change and rash.   Neurological: Negative for dizziness, weakness and headaches.   Psychiatric/Behavioral: Negative for confusion.       Physical Exam   BP: 120/75  Pulse: 121  Temp: 99.5  F (37.5  C)  Resp: 16  Height: 162.6 cm (5' 4\")  Weight: 76.2 kg (168 lb)  Physical Exam  Vitals signs and nursing note reviewed.   Constitutional:       General: She is not in acute distress.     Appearance: Normal appearance. She is not ill-appearing or " toxic-appearing.   HENT:      Head: Normocephalic and atraumatic.      Nose: Nose normal.      Mouth/Throat:      Mouth: Mucous membranes are moist.   Eyes:      Pupils: Pupils are equal, round, and reactive to light.   Neck:      Musculoskeletal: Normal range of motion. No neck rigidity.   Cardiovascular:      Rate and Rhythm: Normal rate.      Pulses: Normal pulses.      Heart sounds: Normal heart sounds.   Pulmonary:      Effort: Pulmonary effort is normal. No respiratory distress.      Breath sounds: Normal breath sounds.   Abdominal:      General: Abdomen is flat. There is no distension.      Palpations: Abdomen is soft.      Comments: Mild tenderness palpation in the right lower quadrant.  No guarding rebound or peritoneal signs.  Is otherwise soft.  Normal bowel sounds.  No CVA tenderness.  No skin changes.   Musculoskeletal: Normal range of motion.         General: No swelling or deformity.   Skin:     General: Skin is warm.      Capillary Refill: Capillary refill takes less than 2 seconds.   Neurological:      Mental Status: She is alert and oriented to person, place, and time.   Psychiatric:         Mood and Affect: Mood normal.         ED Course      Procedures     1:17 AM  The patient was seen and examined by Raul Diaz DO in Room ED15.            Results for orders placed or performed during the hospital encounter of 06/04/21   CT Abdomen Pelvis w/o Contrast     Status: None    Narrative    EXAM: CT ABDOMEN PELVIS W/O CONTRAST  LOCATION: [facility]  DATE/TIME: 6/4/2021 4:30 AM    INDICATION: RLQ abdominal pain, appendicitis suspected (Age >= 14y)  COMPARISON: None.  TECHNIQUE: CT scan of the abdomen and pelvis was performed without IV contrast. Multiplanar reformats were obtained. Dose reduction techniques were used.  CONTRAST: None.    FINDINGS:   LOWER CHEST: Central venous catheter terminates at atrial caval junction.    HEPATOBILIARY: Status post cholecystectomy.    PANCREAS:  Normal.    SPLEEN: Normal.    ADRENAL GLANDS: Normal.    KIDNEYS/BLADDER: Normal.    BOWEL: Normal appendix. No bowel obstruction, colitis, or diverticulitis.    LYMPH NODES: Normal.    VASCULATURE: Unremarkable.    PELVIC ORGANS: Normal.    MUSCULOSKELETAL: Normal.      Impression    IMPRESSION:   1.  Normal appendix. No bowel obstruction, colitis, or diverticulitis.  2.  Status post cholecystectomy.  3.  No obstructing renal or ureteral stone. No hydroureteronephrosis.     CBC with platelets differential     Status: Abnormal   Result Value Ref Range    WBC 15.4 (H) 4.0 - 11.0 10e9/L    RBC Count 3.12 (L) 3.8 - 5.2 10e12/L    Hemoglobin 9.4 (L) 11.7 - 15.7 g/dL    Hematocrit 27.6 (L) 35.0 - 47.0 %    MCV 89 78 - 100 fl    MCH 30.1 26.5 - 33.0 pg    MCHC 34.1 31.5 - 36.5 g/dL    RDW 20.5 (H) 10.0 - 15.0 %    Platelet Count 355 150 - 450 10e9/L    Diff Method Automated Method     % Neutrophils 61.2 %    % Lymphocytes 22.3 %    % Monocytes 5.8 %    % Eosinophils 8.7 %    % Basophils 1.6 %    % Immature Granulocytes 0.4 %    Nucleated RBCs 1 (H) 0 /100    Absolute Neutrophil 9.4 (H) 1.6 - 8.3 10e9/L    Absolute Lymphocytes 3.4 0.8 - 5.3 10e9/L    Absolute Monocytes 0.9 0.0 - 1.3 10e9/L    Absolute Eosinophils 1.3 (H) 0.0 - 0.7 10e9/L    Absolute Basophils 0.2 0.0 - 0.2 10e9/L    Abs Immature Granulocytes 0.1 0 - 0.4 10e9/L    Absolute Nucleated RBC 0.1    Comprehensive metabolic panel     Status: Abnormal   Result Value Ref Range    Sodium 135 133 - 144 mmol/L    Potassium 3.8 3.4 - 5.3 mmol/L    Chloride 107 94 - 109 mmol/L    Carbon Dioxide 24 20 - 32 mmol/L    Anion Gap 5 3 - 14 mmol/L    Glucose 90 70 - 99 mg/dL    Urea Nitrogen 12 7 - 30 mg/dL    Creatinine 0.56 0.52 - 1.04 mg/dL    GFR Estimate >90 >60 mL/min/[1.73_m2]    GFR Estimate If Black >90 >60 mL/min/[1.73_m2]    Calcium 8.9 8.5 - 10.1 mg/dL    Bilirubin Total 2.0 (H) 0.2 - 1.3 mg/dL    Albumin 4.0 3.4 - 5.0 g/dL    Protein Total 8.8 6.8 - 8.8 g/dL     Alkaline Phosphatase 98 40 - 150 U/L    ALT 98 (H) 0 - 50 U/L    AST 80 (H) 0 - 45 U/L   Reticulocyte count     Status: Abnormal   Result Value Ref Range    % Retic 16.4 (H) 0.5 - 2.0 %    Absolute Retic 511.1 (H) 25 - 95 10e9/L   UA reflex to Microscopic and Culture     Status: None    Specimen: Urine Midstream; Midstream Urine   Result Value Ref Range    Color Urine Light Yellow     Appearance Urine Clear     Glucose Urine Negative NEG^Negative mg/dL    Bilirubin Urine Negative NEG^Negative    Ketones Urine Negative NEG^Negative mg/dL    Specific Gravity Urine 1.012 1.003 - 1.035    Blood Urine Negative NEG^Negative    pH Urine 6.0 5.0 - 7.0 pH    Protein Albumin Urine Negative NEG^Negative mg/dL    Urobilinogen mg/dL Normal 0.0 - 2.0 mg/dL    Nitrite Urine Negative NEG^Negative    Leukocyte Esterase Urine Negative NEG^Negative    Source Midstream Urine    HCG qualitative     Status: None   Result Value Ref Range    HCG Qualitative Serum Negative NEG^Negative   Lactate for Sepsis Protocol     Status: None   Result Value Ref Range    Lactate for Sepsis Protocol 0.8 0.7 - 2.0 mmol/L     Medications   lactated ringers BOLUS 1,000 mL (0 mLs Intravenous Stopped 6/4/21 0300)   diphenhydrAMINE (BENADRYL) capsule 50 mg (50 mg Oral Given 6/4/21 0129)   ketorolac (TORADOL) injection 15 mg (15 mg Intravenous Given 6/4/21 0128)   morphine (PF) injection 2 mg (2 mg Intravenous Given 6/4/21 0414)   heparin 100 UNIT/ML injection 5 mL (5 mLs Intracatheter Given 6/4/21 1805)        Assessments & Plan (with Medical Decision Making)   Patient with history of sickle cell disease, well-known to the emergency department, presents to the ED with complaint of right-sided low back pain and right abdominal pain.  Notes that she typically has back pain with sickle cell disease, but does not usually have abdominal pain.    Differential diagnosis includes sickle cell pain crisis, exacerbation of chronic pain, appendicitis, colitis,  ureterolithiasis, gastroenteritis, ovarian pathology, musculoskeletal/rectus abdominis injury    On arrival, patient mildly tachycardic, otherwise has normal vital signs.  She appears uncomfortable due to the pain.  She has some mild tenderness palpation in the right lower quadrant without any guarding rebound or other peritoneal signs.  Abdomen is otherwise soft.  No CVA tenderness.    Plan for CBC, CMP, urinalysis, urine hCG, reticulocyte count, CT abdomen pelvis without contrast to rule out appendicitis, renal stone, (patient has contrast allergy).    Will give IV morphine, IV fluids, Benadryl, per patient's care plan.  Plan to sign out to overnight provider for results of CT, reassessment of symptoms.    I have reviewed the nursing notes. I have reviewed the findings, diagnosis, plan and need for follow up with the patient.    Discharge Medication List as of 6/4/2021  5:30 AM          Final diagnoses:   Sickle cell pain crisis (H)   Abdominal pain, right lower quadrant       --  I, Isaac Walton am serving as a trained medical scribe to document services personally performed by Raul Diaz DO, based on the provider's statements to me.     IRaul DO, was physically present and have reviewed and verified the accuracy of this note documented by Isaac Walton.    Raul Diaz DO  [facility] EMERGENCY DEPARTMENT  6/3/2021     Raul Diaz DO  06/04/21 1930

## 2021-06-04 NOTE — ED TRIAGE NOTES
Pt present to the ER with c/o of Sick Cell Pain. Pt states home medications are working and she has been waiting for 2 days to be seen. Pt states that she does now have some sharp pain in the RLQ.

## 2021-06-04 NOTE — DISCHARGE INSTRUCTIONS
Continue with your normal home medications.  Follow-up with your hematologist next week.  Return with any new or worsening symptoms, or any other concerns.

## 2021-06-04 NOTE — NURSING NOTE
"Oncology Rooming Note    June 4, 2021 1:07 PM   Jenniefr Cervantes is a 21 year old female who presents for:    Chief Complaint   Patient presents with     Oncology Clinic Visit     SICKLE CELL     Initial Vitals: /80   Pulse 104   Temp 98  F (36.7  C) (Oral)   Wt 75.6 kg (166 lb 9.6 oz)   SpO2 95%   BMI 28.60 kg/m   Estimated body mass index is 28.6 kg/m  as calculated from the following:    Height as of 6/3/21: 1.626 m (5' 4\").    Weight as of this encounter: 75.6 kg (166 lb 9.6 oz). Body surface area is 1.85 meters squared.  Data Unavailable Comment: Data Unavailable   No LMP recorded. Patient has had an injection.  Allergies reviewed: Yes  Medications reviewed: Yes    Medications: Medication refills not needed today.  Pharmacy name entered into EPIC:    [facility] - [place], [place] - [address] [id number] [address] [facility] [place], [place] [phone]  [id number] [address] AT Counts include [address] beds at the [facility] #[id number] [facility] [address] [facility] [address] OF [address] [facility] - [place], [place] - [address]    Clinical concerns: No new concerns.        Ember Campos CMA              "

## 2021-06-04 NOTE — PROGRESS NOTES
Patient stated since labs were drawn at 1am and 4am in the ER that she feels additional labs are not needed. Vitals taken and patient checked into next appointment.    -Arely GUILLEN, ALLEN

## 2021-06-04 NOTE — ED NOTES
The patient was accepted at shift change signout with plan for me to follow-up on her pregnancy test (-) UA (reassuring) and CT abdomen (no acute abnormalities).  Upon repeat evaluation she states she feels dramatically improved.  She states she is not having much abdominal pain anymore.  On repeat exam she really does not have any tenderness.  She feels that symptoms were most likely from her sickle cell, feels she is ready to try to discharge.  She is encouraged to return to the ER with any new or worsening symptoms, any other concerns.  She is encouraged to follow-up with her hematologist next week.  She verbalizes understanding and is agreeable to the plan.    Dictation Disclaimer: Some of this Note has been completed with voice-recognition dictation software. Although errors are generally corrected real-time, there is the potential for a rare error to be present in the completed chart.       Court Ring MD  06/04/21 [id number]

## 2021-06-04 NOTE — LETTER
6/4/2021         RE: Jennifer Cervantes  [address]  [facility] [address]        Dear Colleague,    Thank you for referring your patient, Jennifer Cervantes, to the [facility]. Please see a copy of my visit note below.    Patient stated since labs were drawn at 1am and 4am in the ER that she feels additional labs are not needed. Vitals taken and patient checked into next appointment.    -Arely GUILLEN CMA    Sickle Cell Outpatient Follow Up Visit      Date of encounter: Jun 4, 2021    Jennifer Cervantes is a 21 year old female who is being seen in clinic for hospital follow up and health maintenance related to SCD      Interval History: Jennifer continues to have recurrent pain issues. In the last 2 weeks, she has come several times to the ED, though she has generally been able to stay out of the hospital. Multiple times, she has come in and found it too full to stick around. One time this week she just stayed home while another time she came back in the early morning hours. She went back again this morning. She was having some right sided abdominal pain today though CT was negative for appendicitis. This was atypical for her but was not present during this visit (~7-8 hours after she left the ED).     She is trying everything she can do at home to avoid coming in since she feels a significant lack of respect in the last several times in the ED. She had incontinence issues again and did not get timely help. One time she was trying to get to the ED and her IV came out. Since she is on Xarelto, she had a lot of extra bleeding. This is on top of waiting for ~2 hours even with relatively empty waiting rooms. She is very frustrated by this lack of respect and is becoming resistant to coming back to the [place] ED.    Her pain has primarily been in her back and legs. No fevers. No dyspnea. The Desferal infusions are going very well per her recall but she is having a lot of issues with delivery of  Carl so she has not had it in several weeks. She is back in her mom's house again, which is stressful but she is just trying to stay out of things altogether, pledging to move out again at some point.       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 She also has significant anxiety and depression with a strong anxiety about transferring to the adult clinic. She usually has pain crises secondary to the anxiety.      --------------------------------  Plan last reviewed with patient: 3/26/2021    Patient background: 22yo F, enjoys movies and kids though there are times where she does not really want to talk to people. Does not have a lot of social support at home.     Sickle Cell Disease History  Primary Hematologist: Perla  PCP: Raul  Genotype: SS  Acute Pain Crisis Treatment:    ER/Acute Care/Infusion Clinic:   o Morphine 2 mg IVP/SC Q1H X 3 doses  o Toradol x1 (avoid through early May due to being on anticoagulation)  o Maintenance IV fluids with LR  o Other: Benadryl PO and Zofran 8 mg IV PRN itching or nausea    Inpatient:  o Opioid: Morphine 2 mg IV Q1H PRN until PCA starts  o PCA plan:   - PCA button dose: Morphine 1 mg  - Lockout: 20 minutes  - Continuous Infusion: consider 1 mg/hr  - One hr max: 4 mg  o Other Medications: Benadryl, Zofran  o If PCA not working, she Has had success with ketamine starting at 4mg/h and advancing only to 6mg/h, as 8mg/h made her feel quite poorly.  o ASA on hold (ok to give celebrex)  o Supportive Care: Docusate, Senna  Chronic Pain Medications:    Home regimen Oxycontin 10 mg q12h, oxycodone 10 mg p.o. q.6 hours p.r.n. breakthrough pain.   She also continues on Celebrex, and Zoloft among other medications.    -Also benefits from mental health visits, acupuncture  Baseline Hemoglobin: 7 g/dl without chronic transfusions  Hydroxyurea use: Yes  H/O blood transfusions: Yes, several (iron overload) Most recent 8/21/20    H/O Transfusion Reactions: no    Antibodies:none  H/O Acute Chest Syndrome: Yes    Last episode: 10/2019     ICU/intubation: unsure  H/O Stroke: Yes (managed with chronic transfusions in the past)  H/O VTE: Yes (2/2021)  H/O Cholecystectomy or Splenectomy: no  H/O Asthma, Pulm HTN, AVN, Leg Ulcers, Nephropathy, Retinopathy, etc: Iron overload, asthma, chronic lung disease, mild developmental delay from early stroke    -------------------------------------------    Sickle Cell Disease Comprehensive Checklist    Bone Health/Avascular Necrosis Screening/Symptoms (each visit): no new concerns today    Leg Ulcer evaluation (every visit): none    Hypertension (every visit): mildly hypertensive recently but improved later in evening and previous day     Last urinalysis for microalbuminuria/CKD (annually): within last year    Last pulmonary evaluation (asthma, AMAN, pulm HTN): within last year    Stroke/silent cerebral infarct Hx (Y/N): Yes TIA ~2014, first event ~age 1 with full stroke and R sided weakness    Last PCP Visit: 8/2020    Vaccines:  o PCV13: 5/13/19  o Pneumovax (PPSV23): 3/04, 10/09, 7/12/19 (next due 7/2024)  o Menactra: 4/2010, 9/2015 (MCV overdue Sept 2020)  o Influenza: got 20-21 vaccine per self report    Audiology (chelation): done 6/2020, normal    Past Medical History:  Past Medical History:   Diagnosis Date     Anxiety      Bleeding disorder (H)      Blood clotting disorder (H)      Cerebral infarction (H) 2015     Cognitive developmental delay     low IQ. 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  side (H)     right hand contractures     Iron overload due to repeated red blood cell transfusions      Migraines      Multiple subsegmental pulmonary emboli without acute cor pulmonale (H) 02/01/2021     Oppositional defiant behavior      Superficial venous thrombosis of arm, right 03/25/2021     Uncomplicated asthma        Past Surgical History:  Past Surgical History:   Procedure Laterality Date     AS INSERT TUNNELED CV 2 CATH W/O PORT/PUMP       C BREAST REDUCTION (INCLUDES LIPO) TIER 3 Bilateral 04/23/2019     CHOLECYSTECTOMY       INSERT PORT VASCULAR ACCESS Left 4/21/2021    Procedure: INSERTION, VASCULAR ACCESS PORT (NOT SURE ON SIDE UNTIL REMOVAL);  Surgeon: Rajan More MD;  Location: [facility] OR     IR CHEST PORT PLACEMENT > 5 YRS OF AGE  4/21/2021     IR CVC NON TUNNEL LINE REMOVAL  5/6/2021     IR CVC NON TUNNEL PLACEMENT  04/07/2020     IR PORT REMOVAL LEFT  4/21/2021     REMOVE PORT VASCULAR ACCESS Left 4/21/2021    Procedure: REMOVAL, VASCULAR ACCESS PORT LEFT;  Surgeon: Rajan More MD;  Location: [facility] OR     REPAIR TENDON ELBOW Right 10/02/2019    Procedure: Right Elbow Flexor Lengthening, Flexor Pronator Slide Of Wrist and Finger, Thumb Adductor Lengthening;  Surgeon: Anai Franco MD;  Location: [facility] OR     TONSILLECTOMY Bilateral 10/02/2019    Procedure: Bilateral Tonsillectomy;  Surgeon: Farhana Guy MD;  Location: [facility] OR       Family History:   Family History   Problem Relation Age of Onset     Sickle Cell Trait Mother      Hypertension Mother      Asthma Mother      Sickle Cell Trait Father        Social History:  Social History     Socioeconomic History     Marital status: Single     Spouse name: Not on file     Number of children: Not on file     Years of education: Not on file     Highest education level: Not on file   Occupational History     Not on file   Social Needs     Financial resource strain: Not on file     Food insecurity     Worry: Not on file     Inability:  "Not on file     Transportation needs     Medical: Not on file     Non-medical: Not on file   Tobacco Use     Smoking status: Never Smoker     Smokeless tobacco: Never Used   Substance and Sexual Activity     Alcohol use: Not Currently     Alcohol/week: 0.0 standard drinks     Drug use: Never     Sexual activity: Not Currently     Partners: Male     Birth control/protection: Other   Lifestyle     Physical activity     Days per week: Not on file     Minutes per session: Not on file     Stress: Not on file   Relationships     Social connections     Talks on phone: Not on file     Gets together: Not on file     Attends Congregational service: Not on file     Active member of club or organization: Not on file     Attends meetings of clubs or organizations: Not on file     Relationship status: Not on file     Intimate partner violence     Fear of current or ex partner: Not on file     Emotionally abused: Not on file     Physically abused: Not on file     Forced sexual activity: Not on file   Other Topics Concern     Parent/sibling w/ CABG, MI or angioplasty before 65F 55M? Not Asked   Social History Narrative    Lives with mom and extended family but \"toxic environment\" per her report. She would like to move out but cannot financially do so. She has minimal support at home despite her significant SCD comorbidities and cognitive delay from stroke.       Medications:  Current Outpatient Medications   Medication     acetaminophen (TYLENOL) 325 MG tablet     albuterol (PROAIR HFA/PROVENTIL HFA/VENTOLIN HFA) 108 (90 Base) MCG/ACT inhaler     albuterol (PROVENTIL) (2.5 MG/3ML) 0.083% neb solution     ARIPiprazole (ABILIFY) 2 MG tablet     budesonide-formoterol (SYMBICORT) 160-4.5 MCG/ACT Inhaler     diclofenac (VOLTAREN) 1 % topical gel     diphenhydrAMINE (BENADRYL) 25 MG capsule     EPINEPHrine (ANY BX GENERIC EQUIV) 0.3 MG/0.3ML injection 2-pack     Hydroxyurea 1000 MG TABS     hydrOXYzine (ATARAX) 25 MG tablet     JADENU 360 MG " tablet     lidocaine-prilocaine (EMLA) 2.5-2.5 % external cream     naloxone (NARCAN) 4 MG/0.1ML nasal spray     ondansetron (ZOFRAN) 8 MG tablet     oxyCODONE (OXYCONTIN) 10 MG 12 hr tablet     oxyCODONE IR (ROXICODONE) 15 MG tablet     rivaroxaban ANTICOAGULANT (XARELTO ANTICOAGULANT) 20 MG TABS tablet     sertraline (ZOLOFT) 100 MG tablet     medroxyPROGESTERone (DEPO-PROVERA) 150 MG/ML IM injection     Current Facility-Administered Medications   Medication     medroxyPROGESTERone (DEPO-PROVERA) syringe 150 mg         Physical Exam:   /80   Pulse 104   Temp 98  F (36.7  C) (Oral)   Wt 75.6 kg (166 lb 9.6 oz)   SpO2 95%   BMI 28.60 kg/m       GEN: young  female, appears comfortable in clinic, frustrated and tired  HEENT: no icterus, MMM  CV: RRR, no murmurs  NECK: no visible asymmetry  RESP: speaking in full sentences, no increased work of breathing, clear to auscultation  SKIN: no bruising noted  MSK: contracture at right wrist (chronic), now with slight edema compared to previous visits but non painful to touch  NEURO: alert and oriented      Labs:   Results for HIMANSHU AL (MRN [id number]) as of 6/4/2021 14:23   Ref. Range 6/4/2021 01:24 6/4/2021 04:10   Sodium Latest Ref Range: 133 - 144 mmol/L 135    Potassium Latest Ref Range: 3.4 - 5.3 mmol/L 3.8    Chloride Latest Ref Range: 94 - 109 mmol/L 107    Carbon Dioxide Latest Ref Range: 20 - 32 mmol/L 24    Urea Nitrogen Latest Ref Range: 7 - 30 mg/dL 12    Creatinine Latest Ref Range: 0.52 - 1.04 mg/dL 0.56    GFR Estimate Latest Ref Range: >60 mL/min/1.73_m2 >90    GFR Estimate If Black Latest Ref Range: >60 mL/min/1.73_m2 >90    Calcium Latest Ref Range: 8.5 - 10.1 mg/dL 8.9    Anion Gap Latest Ref Range: 3 - 14 mmol/L 5    Albumin Latest Ref Range: 3.4 - 5.0 g/dL 4.0    Protein Total Latest Ref Range: 6.8 - 8.8 g/dL 8.8    Bilirubin Total Latest Ref Range: 0.2 - 1.3 mg/dL 2.0 (H)    Alkaline Phosphatase Latest Ref Range: 40 - 150  U/L 98    ALT Latest Ref Range: 0 - 50 U/L 98 (H)    AST Latest Ref Range: 0 - 45 U/L 80 (H)    HCG Qualitative Serum Latest Ref Range: NEG^Negative  Negative    Lactate for Sepsis Protocol Latest Ref Range: 0.7 - 2.0 mmol/L  0.8   Glucose Latest Ref Range: 70 - 99 mg/dL 90    WBC Latest Ref Range: 4.0 - 11.0 10e9/L 15.4 (H)    Hemoglobin Latest Ref Range: 11.7 - 15.7 g/dL 9.4 (L)    Hematocrit Latest Ref Range: 35.0 - 47.0 % 27.6 (L)    Platelet Count Latest Ref Range: 150 - 450 10e9/L 355    RBC Count Latest Ref Range: 3.8 - 5.2 10e12/L 3.12 (L)    MCV Latest Ref Range: 78 - 100 fl 89    MCH Latest Ref Range: 26.5 - 33.0 pg 30.1    MCHC Latest Ref Range: 31.5 - 36.5 g/dL 34.1    RDW Latest Ref Range: 10.0 - 15.0 % 20.5 (H)    Diff Method Unknown Automated Method    % Neutrophils Latest Units: % 61.2    % Lymphocytes Latest Units: % 22.3    % Monocytes Latest Units: % 5.8    % Eosinophils Latest Units: % 8.7    % Basophils Latest Units: % 1.6    % Immature Granulocytes Latest Units: % 0.4    Nucleated RBCs Latest Ref Range: 0 /100 1 (H)    Absolute Neutrophil Latest Ref Range: 1.6 - 8.3 10e9/L 9.4 (H)    Absolute Lymphocytes Latest Ref Range: 0.8 - 5.3 10e9/L 3.4    Absolute Monocytes Latest Ref Range: 0.0 - 1.3 10e9/L 0.9    Absolute Eosinophils Latest Ref Range: 0.0 - 0.7 10e9/L 1.3 (H)    Absolute Basophils Latest Ref Range: 0.0 - 0.2 10e9/L 0.2    Abs Immature Granulocytes Latest Ref Range: 0 - 0.4 10e9/L 0.1    Absolute Nucleated RBC Unknown 0.1    % Retic Latest Ref Range: 0.5 - 2.0 % 16.4 (H)    Absolute Retic Latest Ref Range: 25 - 95 10e9/L 511.1 (H)    Color Urine Unknown  Light Yellow   Appearance Urine Unknown  Clear   Glucose Urine Latest Ref Range: NEG^Negative mg/dL  Negative   Bilirubin Urine Latest Ref Range: NEG^Negative   Negative   Ketones Urine Latest Ref Range: NEG^Negative mg/dL  Negative   Specific Gravity Urine Latest Ref Range: 1.003 - 1.035   1.012   pH Urine Latest Ref Range: 5.0 - 7.0  pH  6.0   Protein Albumin Urine Latest Ref Range: NEG^Negative mg/dL  Negative   Urobilinogen mg/dL Latest Ref Range: 0.0 - 2.0 mg/dL  Normal   Nitrite Urine Latest Ref Range: NEG^Negative   Negative   Blood Urine Latest Ref Range: NEG^Negative   Negative   Leukocyte Esterase Urine Latest Ref Range: NEG^Negative   Negative   Source Unknown  Midstream Urine       Imaging:   CT abdomen negative for appendicitis    F[facility]iscan 3/25/21  Average liver iron concentration is 43 mg/g dry tissue (normal = 0.17 - 1.8)  Average liver iron concentration is 770 mmol/kg dry tissue (normal = 3 - 33)     Other findings: Diffuse hypodensity of  liver and spleen, consistent with secondary hemochromatosis.     ----------    Assessment:  Jennifer Cervantes is a 21 year old female with HbSS. Her disease has been complicated by stroke leading to significant cognitive delay and right sided hemiparesis, high anxiety leading to frequent pain crises on top of chronic pain syndrome, poor social structure at home with no real support, and iron overload secondary to repeated transfusions. She has had significant stressors at home and her 2021 has been marked by a long admission and separately, PE and SVT + DVT in RUE of unclear chronicity. She also has clear evidence of massive iron overload, which was suspected but is requiring urgent intervention.    Major Topics of the Visit:  1. Pain Management: We will maintain the current strategy of Oxycodone PRN and Oxycontin 10 mg q12h. NSAIDs and ASA on hold while on apixaban. I am going to stop Celebrex given a hypothetical concern of unopposed COX1 activity in a chronic setting leading to vasoconstriction. Mental stress is also important here and we need to maintain a support structure and she can engage with her counselor. SW has engaged with her on this    -Crizanlizumab to start 7/14  2. Iron overload/Pharmacy Issues: She had Jadenu ordered a few months ago but there have been difficulties getting it at  refill time. She is now on Desferal as well. LIC was 43 (3/2021) so we need to act urgently. Desferal HD over 48 hours is going well. Jawill hopefully start next weekend with new Port. Her elevated AST/ALT may be associated with the iron and/or Jadenu but are improved today. We will monitor for now without medication modifications. Synthetic function for the liver seems to be intact  Repeat Brenda in July (ordered)  3. High RBC turnover: Jennifer really has a high degree of RBC turnover, more than most patients I have seen. Oxybryta led to more frequent pain episodes (chest pain, which was new) and did not change the RBC turnover so it was stopped in December. No change currently  4. Pulmonary Emboli + new RUE DVT (innominate vein) of unclear chronicity + new symptomatic R cephalic SVT this week: Switched to apixaban with full starter pack given the new clots. No obvious trigger. I will treat the SVT primarily given the symptoms, and the timing comes out for 6 weeks to be around the time she would have finished the rivaroxaban. Treating through 7/27. Holding aspirin while on anticoagulation  5. Stroke sequelae on right side: I will reach out to PM&R to see if they may be able to better assist with her weakness. I do not think a surgical approach is warranted at this point. This visit has not yet occurred  6. Mental Health: Refilled arapiprazole and sertraline. Will try to get her re-initiated with Dr. Carter. Some of it is related to treatment in ED. I have now added documentation specifically mentioning the incontinence into the Care Coordination note.  Follow up: 2 weeks for Rj.      Review of external notes as documented above   Brenda ordered  Review of the result(s) of each unique test - I did not order labs for this visit since labs done this morning  Diagnosis or treatment significantly limited by social determinants of health - sickle cell disease, racial inequity, difficult social situation at  home    30 min spent on the date of the encounter in chart review, patient visit, review of tests, documentation and/or discussion with other providers about the issues documented above.       Eric Duncan MD  Hematologist  Division of Hematology, Oncology, and Transplantation  [facility]  [facility]  Pager: [phone]              Again, thank you for allowing me to participate in the care of your patient.        Sincerely,        Eric Duncan MD

## 2021-06-06 ENCOUNTER — HOSPITAL ENCOUNTER (EMERGENCY)
Facility: CLINIC | Age: 21
Discharge: HOME | End: 2021-06-06
Attending: EMERGENCY MEDICINE | Admitting: EMERGENCY MEDICINE
Payer: COMMERCIAL

## 2021-06-06 ENCOUNTER — APPOINTMENT (OUTPATIENT)
Dept: GENERAL RADIOLOGY | Facility: CLINIC | Age: 21
End: 2021-06-06
Attending: EMERGENCY MEDICINE
Payer: COMMERCIAL

## 2021-06-06 VITALS
SYSTOLIC BLOOD PRESSURE: 132 MMHG | HEIGHT: 64 IN | HEART RATE: 102 BPM | TEMPERATURE: 98.1 F | DIASTOLIC BLOOD PRESSURE: 86 MMHG | WEIGHT: 168 LBS | BODY MASS INDEX: 28.68 KG/M2 | RESPIRATION RATE: 20 BRPM | OXYGEN SATURATION: 94 %

## 2021-06-06 DIAGNOSIS — D57.00 SICKLE CELL PAIN CRISIS (H): ICD-10-CM

## 2021-06-06 LAB
ALBUMIN SERPL-MCNC: 3.9 G/DL (ref 3.4–5)
ALBUMIN UR-MCNC: NEGATIVE MG/DL
ALP SERPL-CCNC: 94 U/L (ref 40–150)
ALT SERPL W P-5'-P-CCNC: 94 U/L (ref 0–50)
ANION GAP SERPL CALCULATED.3IONS-SCNC: 7 MMOL/L (ref 3–14)
APPEARANCE UR: CLEAR
AST SERPL W P-5'-P-CCNC: 84 U/L (ref 0–45)
BASOPHILS # BLD AUTO: 0.3 10E9/L (ref 0–0.2)
BASOPHILS NFR BLD AUTO: 1.4 %
BILIRUB SERPL-MCNC: 2.1 MG/DL (ref 0.2–1.3)
BILIRUB UR QL STRIP: NEGATIVE
BUN SERPL-MCNC: 13 MG/DL (ref 7–30)
CALCIUM SERPL-MCNC: 8.5 MG/DL (ref 8.5–10.1)
CHLORIDE SERPL-SCNC: 108 MMOL/L (ref 94–109)
CO2 SERPL-SCNC: 22 MMOL/L (ref 20–32)
COLOR UR AUTO: ABNORMAL
CREAT SERPL-MCNC: 0.57 MG/DL (ref 0.52–1.04)
DIFFERENTIAL METHOD BLD: ABNORMAL
EOSINOPHIL # BLD AUTO: 1.3 10E9/L (ref 0–0.7)
EOSINOPHIL NFR BLD AUTO: 6.4 %
ERYTHROCYTE [DISTWIDTH] IN BLOOD BY AUTOMATED COUNT: 20.8 % (ref 10–15)
GFR SERPL CREATININE-BSD FRML MDRD: >90 ML/MIN/{1.73_M2}
GLUCOSE SERPL-MCNC: 111 MG/DL (ref 70–99)
GLUCOSE UR STRIP-MCNC: NEGATIVE MG/DL
HCG UR QL: NEGATIVE
HCT VFR BLD AUTO: 25.4 % (ref 35–47)
HGB BLD-MCNC: 8.9 G/DL (ref 11.7–15.7)
HGB UR QL STRIP: NEGATIVE
IMM GRANULOCYTES # BLD: 0.1 10E9/L (ref 0–0.4)
IMM GRANULOCYTES NFR BLD: 0.6 %
KETONES UR STRIP-MCNC: NEGATIVE MG/DL
LACTATE BLD-SCNC: 1.1 MMOL/L (ref 0.7–2)
LEUKOCYTE ESTERASE UR QL STRIP: NEGATIVE
LYMPHOCYTES # BLD AUTO: 3.9 10E9/L (ref 0.8–5.3)
LYMPHOCYTES NFR BLD AUTO: 19.4 %
MCH RBC QN AUTO: 30.8 PG (ref 26.5–33)
MCHC RBC AUTO-ENTMCNC: 35 G/DL (ref 31.5–36.5)
MCV RBC AUTO: 88 FL (ref 78–100)
MONOCYTES # BLD AUTO: 1.2 10E9/L (ref 0–1.3)
MONOCYTES NFR BLD AUTO: 5.8 %
MUCOUS THREADS #/AREA URNS LPF: PRESENT /LPF
NEUTROPHILS # BLD AUTO: 13.4 10E9/L (ref 1.6–8.3)
NEUTROPHILS NFR BLD AUTO: 66.4 %
NITRATE UR QL: NEGATIVE
NRBC # BLD AUTO: 0.1 10*3/UL
NRBC BLD AUTO-RTO: 1 /100
PH UR STRIP: 6 PH (ref 5–7)
PLATELET # BLD AUTO: 398 10E9/L (ref 150–450)
POTASSIUM SERPL-SCNC: 3.8 MMOL/L (ref 3.4–5.3)
PROCALCITONIN SERPL-MCNC: 0.14 NG/ML
PROT SERPL-MCNC: 8.4 G/DL (ref 6.8–8.8)
RBC # BLD AUTO: 2.89 10E12/L (ref 3.8–5.2)
RBC #/AREA URNS AUTO: <1 /HPF (ref 0–2)
RETICS # AUTO: 476.8 10E9/L (ref 25–95)
RETICS/RBC NFR AUTO: 16.7 % (ref 0.5–2)
SODIUM SERPL-SCNC: 137 MMOL/L (ref 133–144)
SOURCE: ABNORMAL
SP GR UR STRIP: 1.01 (ref 1–1.03)
SQUAMOUS #/AREA URNS AUTO: 0 /HPF (ref 0–1)
UROBILINOGEN UR STRIP-MCNC: NORMAL MG/DL (ref 0–2)
WBC # BLD AUTO: 20.3 10E9/L (ref 4–11)
WBC #/AREA URNS AUTO: 0 /HPF (ref 0–5)

## 2021-06-06 PROCEDURE — 83605 ASSAY OF LACTIC ACID: CPT | Performed by: EMERGENCY MEDICINE

## 2021-06-06 PROCEDURE — 84145 PROCALCITONIN (PCT): CPT | Performed by: EMERGENCY MEDICINE

## 2021-06-06 PROCEDURE — 258N000003 HC RX IP 258 OP 636: Performed by: EMERGENCY MEDICINE

## 2021-06-06 PROCEDURE — 71046 X-RAY EXAM CHEST 2 VIEWS: CPT

## 2021-06-06 PROCEDURE — 87040 BLOOD CULTURE FOR BACTERIA: CPT | Performed by: EMERGENCY MEDICINE

## 2021-06-06 PROCEDURE — 85045 AUTOMATED RETICULOCYTE COUNT: CPT | Performed by: EMERGENCY MEDICINE

## 2021-06-06 PROCEDURE — 85025 COMPLETE CBC W/AUTO DIFF WBC: CPT | Performed by: EMERGENCY MEDICINE

## 2021-06-06 PROCEDURE — 250N000011 HC RX IP 250 OP 636: Performed by: EMERGENCY MEDICINE

## 2021-06-06 PROCEDURE — 81001 URINALYSIS AUTO W/SCOPE: CPT | Performed by: EMERGENCY MEDICINE

## 2021-06-06 PROCEDURE — 80053 COMPREHEN METABOLIC PANEL: CPT | Performed by: EMERGENCY MEDICINE

## 2021-06-06 PROCEDURE — 71046 X-RAY EXAM CHEST 2 VIEWS: CPT | Mod: 26 | Performed by: RADIOLOGY

## 2021-06-06 PROCEDURE — 96361 HYDRATE IV INFUSION ADD-ON: CPT | Performed by: EMERGENCY MEDICINE

## 2021-06-06 PROCEDURE — 96376 TX/PRO/DX INJ SAME DRUG ADON: CPT | Performed by: EMERGENCY MEDICINE

## 2021-06-06 PROCEDURE — 99285 EMERGENCY DEPT VISIT HI MDM: CPT | Mod: 25 | Performed by: EMERGENCY MEDICINE

## 2021-06-06 PROCEDURE — 99285 EMERGENCY DEPT VISIT HI MDM: CPT | Performed by: EMERGENCY MEDICINE

## 2021-06-06 PROCEDURE — 81025 URINE PREGNANCY TEST: CPT | Performed by: EMERGENCY MEDICINE

## 2021-06-06 PROCEDURE — 96374 THER/PROPH/DIAG INJ IV PUSH: CPT | Performed by: EMERGENCY MEDICINE

## 2021-06-06 RX ORDER — MORPHINE SULFATE 2 MG/ML
2 INJECTION, SOLUTION INTRAMUSCULAR; INTRAVENOUS ONCE
Status: COMPLETED | OUTPATIENT
Start: 2021-06-06 | End: 2021-06-06

## 2021-06-06 RX ORDER — HEPARIN SODIUM,PORCINE 10 UNIT/ML
5-10 VIAL (ML) INTRAVENOUS EVERY 24 HOURS
Status: DISCONTINUED | OUTPATIENT
Start: 2021-06-06 | End: 2021-06-06 | Stop reason: HOSPADM

## 2021-06-06 RX ADMIN — MORPHINE SULFATE 2 MG: 2 INJECTION, SOLUTION INTRAMUSCULAR; INTRAVENOUS at 04:31

## 2021-06-06 RX ADMIN — HEPARIN, PORCINE (PF) 10 UNIT/ML INTRAVENOUS SYRINGE 5 ML: at 06:57

## 2021-06-06 RX ADMIN — MORPHINE SULFATE 2 MG: 2 INJECTION, SOLUTION INTRAMUSCULAR; INTRAVENOUS at 05:34

## 2021-06-06 RX ADMIN — MORPHINE SULFATE 2 MG: 2 INJECTION, SOLUTION INTRAMUSCULAR; INTRAVENOUS at 03:22

## 2021-06-06 RX ADMIN — SODIUM CHLORIDE 1000 ML: 9 INJECTION, SOLUTION INTRAVENOUS at 03:23

## 2021-06-06 ASSESSMENT — MIFFLIN-ST. JEOR: SCORE: 1507.04

## 2021-06-06 NOTE — DISCHARGE INSTRUCTIONS
Continue with your outpatient medications. As discussed, your white blood cells are very high, for unclear reasons. It's possible that you could have an infection that has not yet been uncovered yet, or something else serious going on. You are declining observation at this time. Follow up with your clinic doctor on Monday, and return immediately with any new or worsening symptoms or any other concerns.

## 2021-06-06 NOTE — ED TRIAGE NOTES
Pt arrives f/ home with c/o sickle cell pain in both of her legs. Started yesterday around 7pm. States she took her regular pain med at home which is oxycodone. O2 96% on RA. Slightly tachycardic 110-116.

## 2021-06-06 NOTE — ED PROVIDER NOTES
ED Provider Note  [facility]      History     Chief Complaint   Patient presents with     Sickle Cell Pain Crisis     HPI  Jennifer Cervantes is a 21 year old female with a history of sickle cell disease as well as history of stroke leading to significant cognitive delays and right upper extremity hemiparesis, iron overload 2/2 chronic transfusions as secondary ppx post-CVA, anxiety/depression, asthma  who presents to the ER with complaint of pain. She reports that she is having pain in both legs, worse in the left, started 4-5 hours prior to arrival. She additionally says that she does have a little bit of all over pain. She reports that she normally takes oxycontin q12 hr and oxycodone q6 hrs prn, but didn't take take either tonight because she knew they wouldn't help for her pain. No chest pain, SOB, cough, fevers, recent illness. No abd pain. No trauma. She is complaining of low back pain. She is taking xaralto for history of clots.  She denies any acute numbness, tingling, weakness.    Past Medical History  Past Medical History:   Diagnosis Date     Anxiety      Bleeding disorder (H)      Blood clotting disorder (H)      Cerebral infarction (H) 2015     Cognitive developmental delay     low IQ. 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 (H)     right hand contractures     Iron overload due to repeated red blood cell transfusions      Migraines      Multiple subsegmental pulmonary emboli without acute cor pulmonale (H) 02/01/2021     Oppositional defiant behavior      Superficial venous thrombosis of arm, right 03/25/2021     Uncomplicated asthma      Past Surgical History:   Procedure Laterality Date     AS INSERT TUNNELED CV 2 CATH W/O PORT/PUMP       C BREAST REDUCTION (INCLUDES LIPO) TIER 3 Bilateral 04/23/2019     CHOLECYSTECTOMY       INSERT PORT VASCULAR  ACCESS Left 4/21/2021    Procedure: INSERTION, VASCULAR ACCESS PORT (NOT SURE ON SIDE UNTIL REMOVAL);  Surgeon: Rajan More MD;  Location: [facility] OR     IR CHEST PORT PLACEMENT > 5 YRS OF AGE  4/21/2021     IR CVC NON TUNNEL LINE REMOVAL  5/6/2021     IR CVC NON TUNNEL PLACEMENT  04/07/2020     IR CVC NON TUNNEL PLACEMENT  4/30/2021     IR PORT REMOVAL LEFT  4/21/2021     REMOVE PORT VASCULAR ACCESS Left 4/21/2021    Procedure: REMOVAL, VASCULAR ACCESS PORT LEFT;  Surgeon: Rajan More MD;  Location: [facility] OR     REPAIR TENDON ELBOW Right 10/02/2019    Procedure: Right Elbow Flexor Lengthening, Flexor Pronator Slide Of Wrist and Finger, Thumb Adductor Lengthening;  Surgeon: Anai Franco MD;  [facility]     TONSILLECTOMY Bilateral 10/02/2019    Procedure: Bilateral Tonsillectomy;  Surgeon: Farhana Guy MD;  [facility]     acetaminophen (TYLENOL) 325 MG tablet  albuterol (PROAIR HFA/PROVENTIL HFA/VENTOLIN HFA) 108 (90 Base) MCG/ACT inhaler  albuterol (PROVENTIL) (2.5 MG/3ML) 0.083% neb solution  ARIPiprazole (ABILIFY) 2 MG tablet  budesonide-formoterol (SYMBICORT) 160-4.5 MCG/ACT Inhaler  diclofenac (VOLTAREN) 1 % topical gel  diphenhydrAMINE (BENADRYL) 25 MG capsule  EPINEPHrine (ANY BX GENERIC EQUIV) 0.3 MG/0.3ML injection 2-pack  Hydroxyurea 1000 MG TABS  hydrOXYzine (ATARAX) 25 MG tablet  JADENU 360 MG tablet  lidocaine-prilocaine (EMLA) 2.5-2.5 % external cream  medroxyPROGESTERone (DEPO-PROVERA) 150 MG/ML IM injection  naloxone (NARCAN) 4 MG/0.1ML nasal spray  ondansetron (ZOFRAN) 8 MG tablet  oxyCODONE (OXYCONTIN) 10 MG 12 hr tablet  oxyCODONE IR (ROXICODONE) 15 MG tablet  rivaroxaban ANTICOAGULANT (XARELTO ANTICOAGULANT) 20 MG TABS tablet  sertraline (ZOLOFT) 100 MG tablet      Allergies   Allergen Reactions     Contrast Dye      Hives and breathing issues     Fish-Derived Products Hives     Seafood Hives     Diagnostic X-Ray Materials      Gadolinium      Family  "History  Family History   Problem Relation Age of Onset     Sickle Cell Trait Mother      Hypertension Mother      Asthma Mother      Sickle Cell Trait Father      Social History   Social History     Tobacco Use     Smoking status: Never Smoker     Smokeless tobacco: Never Used   Substance Use Topics     Alcohol use: Not Currently     Alcohol/week: 0.0 standard drinks     Drug use: Never      Past medical history, past surgical history, medications, allergies, family history, and social history were reviewed with the patient. No additional pertinent items.       Review of Systems  A complete review of systems was performed with pertinent positives and negatives noted in the HPI, and all other systems negative.    Physical Exam   BP: (!) 151/97  Pulse: 116  Temp: 98.1  F (36.7  C)  Resp: 20  Height: 162.6 cm (5' 4\")  Weight: 76.2 kg (168 lb)  SpO2: 96 %  Physical Exam  Constitutional:       General: She is not in acute distress.     Appearance: She is not diaphoretic.   HENT:      Head: Atraumatic.   Eyes:      General: No scleral icterus.  Cardiovascular:      Heart sounds: Normal heart sounds.   Pulmonary:      Effort: No respiratory distress.      Breath sounds: Normal breath sounds.   Abdominal:      Palpations: Abdomen is soft.      Tenderness: There is no abdominal tenderness.   Musculoskeletal:         General: Tenderness (mild bilateral lumbar tenderness) present.   Skin:     General: Skin is warm.      Findings: No rash.         ED Course      Procedures               Results for orders placed or performed during the hospital encounter of 06/06/21   Chest XR,  PA & LAT     Status: None    Narrative    EXAM: XR CHEST 2 VW  LOCATION: [facility]  DATE/TIME: 6/6/2021 4:35 AM    INDICATION: Leukocytosis.  COMPARISON: 05/07/2021      Impression    IMPRESSION: Heart size is normal. Left chest port catheter tip overlies the low SVC near the cavoatrial junction. Improved aeration compared to prior " study. Small amount of residual infiltrate in the right lung base. Left lung is clear. No visible   pneumothorax or pleural effusion.   CBC with platelets differential     Status: Abnormal   Result Value Ref Range    WBC 20.3 (H) 4.0 - 11.0 10e9/L    RBC Count 2.89 (L) 3.8 - 5.2 10e12/L    Hemoglobin 8.9 (L) 11.7 - 15.7 g/dL    Hematocrit 25.4 (L) 35.0 - 47.0 %    MCV 88 78 - 100 fl    MCH 30.8 26.5 - 33.0 pg    MCHC 35.0 31.5 - 36.5 g/dL    RDW 20.8 (H) 10.0 - 15.0 %    Platelet Count 398 150 - 450 10e9/L    Diff Method Automated Method     % Neutrophils 66.4 %    % Lymphocytes 19.4 %    % Monocytes 5.8 %    % Eosinophils 6.4 %    % Basophils 1.4 %    % Immature Granulocytes 0.6 %    Nucleated RBCs 1 (H) 0 /100    Absolute Neutrophil 13.4 (H) 1.6 - 8.3 10e9/L    Absolute Lymphocytes 3.9 0.8 - 5.3 10e9/L    Absolute Monocytes 1.2 0.0 - 1.3 10e9/L    Absolute Eosinophils 1.3 (H) 0.0 - 0.7 10e9/L    Absolute Basophils 0.3 (H) 0.0 - 0.2 10e9/L    Abs Immature Granulocytes 0.1 0 - 0.4 10e9/L    Absolute Nucleated RBC 0.1    Comprehensive metabolic panel     Status: Abnormal   Result Value Ref Range    Sodium 137 133 - 144 mmol/L    Potassium 3.8 3.4 - 5.3 mmol/L    Chloride 108 94 - 109 mmol/L    Carbon Dioxide 22 20 - 32 mmol/L    Anion Gap 7 3 - 14 mmol/L    Glucose 111 (H) 70 - 99 mg/dL    Urea Nitrogen 13 7 - 30 mg/dL    Creatinine 0.57 0.52 - 1.04 mg/dL    GFR Estimate >90 >60 mL/min/[1.73_m2]    GFR Estimate If Black >90 >60 mL/min/[1.73_m2]    Calcium 8.5 8.5 - 10.1 mg/dL    Bilirubin Total 2.1 (H) 0.2 - 1.3 mg/dL    Albumin 3.9 3.4 - 5.0 g/dL    Protein Total 8.4 6.8 - 8.8 g/dL    Alkaline Phosphatase 94 40 - 150 U/L    ALT 94 (H) 0 - 50 U/L    AST 84 (H) 0 - 45 U/L   Lactate for Sepsis Protocol     Status: None   Result Value Ref Range    Lactate for Sepsis Protocol 1.1 0.7 - 2.0 mmol/L   Procalcitonin     Status: None   Result Value Ref Range    Procalcitonin 0.14 ng/ml   UA with Microscopic     Status:  Abnormal   Result Value Ref Range    Color Urine Light Yellow     Appearance Urine Clear     Glucose Urine Negative NEG^Negative mg/dL    Bilirubin Urine Negative NEG^Negative    Ketones Urine Negative NEG^Negative mg/dL    Specific Gravity Urine 1.010 1.003 - 1.035    Blood Urine Negative NEG^Negative    pH Urine 6.0 5.0 - 7.0 pH    Protein Albumin Urine Negative NEG^Negative mg/dL    Urobilinogen mg/dL Normal 0.0 - 2.0 mg/dL    Nitrite Urine Negative NEG^Negative    Leukocyte Esterase Urine Negative NEG^Negative    Source Midstream Urine     WBC Urine 0 0 - 5 /HPF    RBC Urine <1 0 - 2 /HPF    Squamous Epithelial /HPF Urine 0 0 - 1 /HPF    Mucous Urine Present (A) NEG^Negative /LPF   HCG qualitative urine     Status: None   Result Value Ref Range    HCG Qual Urine Negative NEG^Negative   Reticulocyte count     Status: Abnormal   Result Value Ref Range    % Retic 16.7 (H) 0.5 - 2.0 %    Absolute Retic 476.8 (H) 25 - 95 10e9/L     Medications   heparin lock flush 10 UNIT/ML injection 5-10 mL (5 mLs Intracatheter Given 6/6/21 0657)   morphine (PF) injection 2 mg (2 mg Intravenous Given 6/6/21 0322)   0.9% sodium chloride BOLUS (0 mLs Intravenous Stopped 6/6/21 0528)   morphine (PF) injection 2 mg (2 mg Intravenous Given 6/6/21 0431)   morphine (PF) injection 2 mg (2 mg Intravenous Given 6/6/21 0534)        Assessments & Plan (with Medical Decision Making)   The patient states that both leg pain and low back pain are typical sickle cell pain complaints for her.  She feels that this is similar to multiple previous sickle cell pain crises that she has had.  She denies any infectious signs or symptoms.  Basic labs were done, and she does have a significantly elevated white blood cell count of 20.3.  Given this, blood culture was sent, as well as chest x-ray, UA.  No clear infectious etiology noted.  Procalcitonin was in the low range at 0.14, lactate is normal.  The patient did have significant improvement of her  symptoms with her sickle cell pain treatment protocol.  However, I did discuss with her that I was concerned about why her white blood cell count was so high.  She is not on steroids.  I did recommend staying for observation, but she has declined.  She verbalized understanding of my concern for a potentially serious or even life-threatening infection, appears to have decision-making capacity, but is declining at this time.  She is encouraged to return immediately with new or worsening symptoms, any other concerns.  She is encouraged to follow-up with her provider on Monday.  She verbalizes understanding and is agreeable to the plan.    Dictation Disclaimer: Some of this Note has been completed with voice-recognition dictation software. Although errors are generally corrected real-time, there is the potential for a rare error to be present in the completed chart.      I have reviewed the nursing notes. I have reviewed the findings, diagnosis, plan and need for follow up with the patient.    Discharge Medication List as of 6/6/2021  6:46 AM          Final diagnoses:   Sickle cell pain crisis (H)       --  Court Ring  [facility] EMERGENCY DEPARTMENT  6/6/2021     Court Ring MD  06/06/21 0721

## 2021-06-07 ENCOUNTER — NURSE TRIAGE (OUTPATIENT)
Dept: NURSING | Facility: CLINIC | Age: 21
End: 2021-06-07

## 2021-06-07 ENCOUNTER — TELEPHONE (OUTPATIENT)
Dept: ONCOLOGY | Facility: CLINIC | Age: 21
End: 2021-06-07

## 2021-06-07 NOTE — TELEPHONE ENCOUNTER
"[facility] Triage Note    Patient called triage requesting IVF pain meds for lower back and both sides pain rated 9/10 x for about a day .     Stated last took pain medication Oxycodone at 7 am this morning without relief.     Denied fevers, chills, cough, sob, chest pain, nausea or vomiting, edema in extremities    Last infusion 6/6/21 - \"just a little relief with infusion\"   Last clinic visit 6/4/21 with Perla    Pt denied being out of home medications and needing refills today.     At the end of our review she asked how many people were on the list for possible infusion and when informed she said \"don't bother\" even though this writer offered support and offered to contact her if something is available.  Instructed to go to ED if further pain management needed.     Kelsey Santiago RN   BSN, [facility], [facility]  [facility] Triage        "

## 2021-06-07 NOTE — TELEPHONE ENCOUNTER
Pt called in to triage requesting ivf pain meds for sickle cell pain. She stated low back pain currently rated 8/10. She was in the ED 6/6 and stated pain slightly better after ivf pain meds but continuing. When informed she will be put on infusion wait list but there are several people ahead of her she stated then she will just wait for Tuesday to call back if needed. She is not scheduled for any appointments Tuesday. Informed her she can still be on wait list today, she declined and hung up.

## 2021-06-08 ENCOUNTER — HOME INFUSION (PRE-WILLOW HOME INFUSION) (OUTPATIENT)
Dept: PHARMACY | Facility: CLINIC | Age: 21
End: 2021-06-08

## 2021-06-08 ENCOUNTER — HOSPITAL ENCOUNTER (EMERGENCY)
Facility: CLINIC | Age: 21
Discharge: HOME | End: 2021-06-08
Attending: EMERGENCY MEDICINE | Admitting: EMERGENCY MEDICINE
Payer: COMMERCIAL

## 2021-06-08 VITALS
DIASTOLIC BLOOD PRESSURE: 60 MMHG | OXYGEN SATURATION: 92 % | RESPIRATION RATE: 18 BRPM | SYSTOLIC BLOOD PRESSURE: 102 MMHG | TEMPERATURE: 98.9 F | HEART RATE: 97 BPM

## 2021-06-08 DIAGNOSIS — D57.00 SICKLE CELL PAIN CRISIS (H): ICD-10-CM

## 2021-06-08 LAB
ALBUMIN SERPL-MCNC: 3.8 G/DL (ref 3.4–5)
ALP SERPL-CCNC: 90 U/L (ref 40–150)
ALT SERPL W P-5'-P-CCNC: 100 U/L (ref 0–50)
ANION GAP SERPL CALCULATED.3IONS-SCNC: 3 MMOL/L (ref 3–14)
AST SERPL W P-5'-P-CCNC: 90 U/L (ref 0–45)
BASOPHILS # BLD AUTO: 0.2 10E9/L (ref 0–0.2)
BASOPHILS NFR BLD AUTO: 1.5 %
BILIRUB SERPL-MCNC: 2.6 MG/DL (ref 0.2–1.3)
BUN SERPL-MCNC: 11 MG/DL (ref 7–30)
CALCIUM SERPL-MCNC: 8.3 MG/DL (ref 8.5–10.1)
CHLORIDE SERPL-SCNC: 109 MMOL/L (ref 94–109)
CO2 SERPL-SCNC: 26 MMOL/L (ref 20–32)
CREAT SERPL-MCNC: 0.67 MG/DL (ref 0.52–1.04)
DIFFERENTIAL METHOD BLD: ABNORMAL
EOSINOPHIL # BLD AUTO: 1.1 10E9/L (ref 0–0.7)
EOSINOPHIL NFR BLD AUTO: 7.2 %
ERYTHROCYTE [DISTWIDTH] IN BLOOD BY AUTOMATED COUNT: 22.5 % (ref 10–15)
GFR SERPL CREATININE-BSD FRML MDRD: >90 ML/MIN/{1.73_M2}
GLUCOSE SERPL-MCNC: 89 MG/DL (ref 70–99)
HCT VFR BLD AUTO: 24.5 % (ref 35–47)
HGB BLD-MCNC: 8.7 G/DL (ref 11.7–15.7)
IMM GRANULOCYTES # BLD: 0.1 10E9/L (ref 0–0.4)
IMM GRANULOCYTES NFR BLD: 0.5 %
LYMPHOCYTES # BLD AUTO: 3.3 10E9/L (ref 0.8–5.3)
LYMPHOCYTES NFR BLD AUTO: 21.2 %
MCH RBC QN AUTO: 31.4 PG (ref 26.5–33)
MCHC RBC AUTO-ENTMCNC: 35.5 G/DL (ref 31.5–36.5)
MCV RBC AUTO: 88 FL (ref 78–100)
MONOCYTES # BLD AUTO: 1 10E9/L (ref 0–1.3)
MONOCYTES NFR BLD AUTO: 6.4 %
NEUTROPHILS # BLD AUTO: 9.9 10E9/L (ref 1.6–8.3)
NEUTROPHILS NFR BLD AUTO: 63.2 %
NRBC # BLD AUTO: 0.3 10*3/UL
NRBC BLD AUTO-RTO: 2 /100
PLATELET # BLD AUTO: 427 10E9/L (ref 150–450)
POTASSIUM SERPL-SCNC: 3.6 MMOL/L (ref 3.4–5.3)
PROT SERPL-MCNC: 8 G/DL (ref 6.8–8.8)
RBC # BLD AUTO: 2.77 10E12/L (ref 3.8–5.2)
RETICS # AUTO: 537.4 10E9/L (ref 25–95)
RETICS/RBC NFR AUTO: 19.4 % (ref 0.5–2)
SODIUM SERPL-SCNC: 139 MMOL/L (ref 133–144)
WBC # BLD AUTO: 15.6 10E9/L (ref 4–11)

## 2021-06-08 PROCEDURE — 99285 EMERGENCY DEPT VISIT HI MDM: CPT | Performed by: EMERGENCY MEDICINE

## 2021-06-08 PROCEDURE — 258N000003 HC RX IP 258 OP 636: Performed by: EMERGENCY MEDICINE

## 2021-06-08 PROCEDURE — 85025 COMPLETE CBC W/AUTO DIFF WBC: CPT | Performed by: EMERGENCY MEDICINE

## 2021-06-08 PROCEDURE — 96361 HYDRATE IV INFUSION ADD-ON: CPT | Performed by: EMERGENCY MEDICINE

## 2021-06-08 PROCEDURE — 96374 THER/PROPH/DIAG INJ IV PUSH: CPT | Performed by: EMERGENCY MEDICINE

## 2021-06-08 PROCEDURE — 250N000011 HC RX IP 250 OP 636: Performed by: EMERGENCY MEDICINE

## 2021-06-08 PROCEDURE — 99285 EMERGENCY DEPT VISIT HI MDM: CPT | Mod: 25 | Performed by: EMERGENCY MEDICINE

## 2021-06-08 PROCEDURE — 80053 COMPREHEN METABOLIC PANEL: CPT | Performed by: EMERGENCY MEDICINE

## 2021-06-08 PROCEDURE — 96376 TX/PRO/DX INJ SAME DRUG ADON: CPT | Performed by: EMERGENCY MEDICINE

## 2021-06-08 PROCEDURE — 85045 AUTOMATED RETICULOCYTE COUNT: CPT | Performed by: EMERGENCY MEDICINE

## 2021-06-08 RX ORDER — KETOROLAC TROMETHAMINE 15 MG/ML
15 INJECTION, SOLUTION INTRAMUSCULAR; INTRAVENOUS ONCE
Status: DISCONTINUED | OUTPATIENT
Start: 2021-06-08 | End: 2021-06-08 | Stop reason: HOSPADM

## 2021-06-08 RX ORDER — HEPARIN SODIUM (PORCINE) LOCK FLUSH IV SOLN 100 UNIT/ML 100 UNIT/ML
5 SOLUTION INTRAVENOUS ONCE
Status: COMPLETED | OUTPATIENT
Start: 2021-06-08 | End: 2021-06-08

## 2021-06-08 RX ORDER — MORPHINE SULFATE 2 MG/ML
2 INJECTION, SOLUTION INTRAMUSCULAR; INTRAVENOUS
Status: COMPLETED | OUTPATIENT
Start: 2021-06-08 | End: 2021-06-08

## 2021-06-08 RX ADMIN — MORPHINE SULFATE 2 MG: 2 INJECTION, SOLUTION INTRAMUSCULAR; INTRAVENOUS at 04:32

## 2021-06-08 RX ADMIN — MORPHINE SULFATE 2 MG: 2 INJECTION, SOLUTION INTRAMUSCULAR; INTRAVENOUS at 02:09

## 2021-06-08 RX ADMIN — MORPHINE SULFATE 2 MG: 2 INJECTION, SOLUTION INTRAMUSCULAR; INTRAVENOUS at 03:17

## 2021-06-08 RX ADMIN — Medication 5 ML: at 06:28

## 2021-06-08 RX ADMIN — SODIUM CHLORIDE, POTASSIUM CHLORIDE, SODIUM LACTATE AND CALCIUM CHLORIDE 1000 ML: 600; 310; 30; 20 INJECTION, SOLUTION INTRAVENOUS at 02:02

## 2021-06-08 ASSESSMENT — ENCOUNTER SYMPTOMS
EYE PAIN: 0
CONSTIPATION: 0
CHEST TIGHTNESS: 0
COLOR CHANGE: 0
NAUSEA: 0
NECK PAIN: 0
WEAKNESS: 0
PALPITATIONS: 0
SHORTNESS OF BREATH: 0
FREQUENCY: 0
ABDOMINAL DISTENTION: 0
BACK PAIN: 1
CHILLS: 0
ARTHRALGIAS: 1
FATIGUE: 0
VOMITING: 0
DYSURIA: 0
DIARRHEA: 0
DIFFICULTY URINATING: 0
FEVER: 0
DIZZINESS: 0
SORE THROAT: 0
HEADACHES: 0
CONFUSION: 0
ABDOMINAL PAIN: 0
COUGH: 0
MYALGIAS: 1

## 2021-06-08 NOTE — ED TRIAGE NOTES
Pt BIB self for sickle cell pain crisis. Pt took at home medication oxycontin 10 mg and oxycodone 15mg at 2318 6/8 with no relief. Her for pain management.

## 2021-06-08 NOTE — TELEPHONE ENCOUNTER
Pt is calling.    Concern regarding frequent ED visits.    Sickle cell crisis. Was seen in the ED yesterday. Other ED visits: 05/22/2021, 05/24/2021, 05/25/2021, 05/26/2021, 05/28/2021, 05/29/2021, 06/01/2021, 06/02/2021, 06/03/2021,  06/04/2021, and yesterday, 06/06/2021. Was seen by physician 06/04/2021 and had infusion on 06/06/2021 with minimal relief.  Pt wanting to go to the ED again now and is wondering how long the wait is.  I advised her that I am not sure, as I am not in the ED. They will triage her and get her back as soon as possible. Can also try calling the ED directly and ask to speak to pt registration.  She declined triage.  Pt will try calling the other number to the ED.    Claudia Victoria RN  [facility] Nurse Advisor  6/7/2021 at 8:56 PM

## 2021-06-08 NOTE — ED PROVIDER NOTES
ED Provider Note  [facility]      History     Chief Complaint   Patient presents with     Sickle Cell Pain Crisis     HPI  Jennifer Cervantes is a 21 year old female with history of sickle cell disease including previous CVAs, chronic migraines, who presents to the ED with complaint of sickle cell pain.  She notes that the pain is in her low back radiates diffusely through the rest of her body.  Notes it feels exactly like her sickle cell pain in the past.  It is a sharp/stabbing quality.  I saw her for similar complaint last week which included abdominal pain.  CT scan at that time was negative for intra-abdominal pathology.  She says her abdominal pain resolved but now her back pain is worsening.  Has been trying to set up pain control at the infusion center, but has been having scheduling difficulties.  Pain unrelieved with her oral pain medications at home.    Denies any chest pain, shortness of breath, fever/chills, dysuria, pelvic pain, incontinence, urinary retention, numbness/tingling, and has no other medical complaints.    Past Medical History  Past Medical History:   Diagnosis Date     Anxiety      Bleeding disorder (H)      Blood clotting disorder (H)      Cerebral infarction (H) 2015     Cognitive developmental delay     low IQ. 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 (H)     right hand contractures     Iron overload due to repeated red blood cell transfusions      Migraines      Multiple subsegmental pulmonary emboli without acute cor pulmonale (H) 02/01/2021     Oppositional defiant behavior      Superficial venous thrombosis of arm, right 03/25/2021     Uncomplicated asthma      Past Surgical History:   Procedure Laterality Date     AS INSERT TUNNELED CV 2 CATH W/O PORT/PUMP       C BREAST REDUCTION (INCLUDES LIPO) TIER 3 Bilateral 04/23/2019      CHOLECYSTECTOMY       INSERT PORT VASCULAR ACCESS Left 4/21/2021    Procedure: INSERTION, VASCULAR ACCESS PORT (NOT SURE ON SIDE UNTIL REMOVAL);  Surgeon: Rajan More MD;  Location: [facility] OR     IR CHEST PORT PLACEMENT > 5 YRS OF AGE  4/21/2021     IR CVC NON TUNNEL LINE REMOVAL  5/6/2021     IR CVC NON TUNNEL PLACEMENT  04/07/2020     IR CVC NON TUNNEL PLACEMENT  4/30/2021     IR PORT REMOVAL LEFT  4/21/2021     REMOVE PORT VASCULAR ACCESS Left 4/21/2021    Procedure: REMOVAL, VASCULAR ACCESS PORT LEFT;  Surgeon: Rajan More MD;  Location: [facility] OR     REPAIR TENDON ELBOW Right 10/02/2019    Procedure: Right Elbow Flexor Lengthening, Flexor Pronator Slide Of Wrist and Finger, Thumb Adductor Lengthening;  Surgeon: Anai Franco MD;  [facility]     TONSILLECTOMY Bilateral 10/02/2019    Procedure: Bilateral Tonsillectomy;  Surgeon: Farhana Guy MD;  [facility]     acetaminophen (TYLENOL) 325 MG tablet  albuterol (PROAIR HFA/PROVENTIL HFA/VENTOLIN HFA) 108 (90 Base) MCG/ACT inhaler  albuterol (PROVENTIL) (2.5 MG/3ML) 0.083% neb solution  ARIPiprazole (ABILIFY) 2 MG tablet  budesonide-formoterol (SYMBICORT) 160-4.5 MCG/ACT Inhaler  diclofenac (VOLTAREN) 1 % topical gel  diphenhydrAMINE (BENADRYL) 25 MG capsule  EPINEPHrine (ANY BX GENERIC EQUIV) 0.3 MG/0.3ML injection 2-pack  Hydroxyurea 1000 MG TABS  hydrOXYzine (ATARAX) 25 MG tablet  JADENU 360 MG tablet  lidocaine-prilocaine (EMLA) 2.5-2.5 % external cream  medroxyPROGESTERone (DEPO-PROVERA) 150 MG/ML IM injection  naloxone (NARCAN) 4 MG/0.1ML nasal spray  ondansetron (ZOFRAN) 8 MG tablet  oxyCODONE (OXYCONTIN) 10 MG 12 hr tablet  oxyCODONE IR (ROXICODONE) 15 MG tablet  rivaroxaban ANTICOAGULANT (XARELTO ANTICOAGULANT) 20 MG TABS tablet  sertraline (ZOLOFT) 100 MG tablet      Allergies   Allergen Reactions     Contrast Dye      Hives and breathing issues     Fish-Derived Products Hives     Seafood Hives     Diagnostic X-Ray  Materials      Gadolinium      Family History  Family History   Problem Relation Age of Onset     Sickle Cell Trait Mother      Hypertension Mother      Asthma Mother      Sickle Cell Trait Father      Social History   Social History     Tobacco Use     Smoking status: Never Smoker     Smokeless tobacco: Never Used   Substance Use Topics     Alcohol use: Not Currently     Alcohol/week: 0.0 standard drinks     Drug use: Never      Past medical history, past surgical history, medications, allergies, family history, and social history were reviewed with the patient. No additional pertinent items.       Review of Systems   Constitutional: Negative for chills, fatigue and fever.   HENT: Negative for congestion and sore throat.    Eyes: Negative for pain and visual disturbance.   Respiratory: Negative for cough, chest tightness and shortness of breath.    Cardiovascular: Negative for chest pain and palpitations.   Gastrointestinal: Negative for abdominal distention, abdominal pain, constipation, diarrhea, nausea and vomiting.   Genitourinary: Negative for difficulty urinating, dysuria, frequency and urgency.   Musculoskeletal: Positive for arthralgias, back pain and myalgias. Negative for neck pain.   Skin: Negative for color change and rash.   Neurological: Negative for dizziness, weakness and headaches.   Psychiatric/Behavioral: Negative for confusion.     A complete review of systems was performed with pertinent positives and negatives noted in the HPI, and all other systems negative.    Physical Exam   BP: 137/71  Pulse: 111  Temp: 98.9  F (37.2  C)  Resp: 18  SpO2: 92 %  Physical Exam  Vitals signs and nursing note reviewed.   Constitutional:       General: She is not in acute distress.     Appearance: Normal appearance. She is not ill-appearing or toxic-appearing.   HENT:      Head: Normocephalic and atraumatic.      Nose: Nose normal.      Mouth/Throat:      Mouth: Mucous membranes are moist.   Eyes:      Pupils:  Pupils are equal, round, and reactive to light.   Neck:      Musculoskeletal: Normal range of motion. No neck rigidity.   Cardiovascular:      Rate and Rhythm: Normal rate.      Pulses: Normal pulses.      Heart sounds: Normal heart sounds.   Pulmonary:      Effort: Pulmonary effort is normal. No respiratory distress.      Breath sounds: Normal breath sounds.   Abdominal:      General: Abdomen is flat. There is no distension.   Musculoskeletal: Normal range of motion.         General: No swelling or deformity.      Comments: Low back is diffusely tender to palpation.  No specific midline tenderness.  No obvious deformities.  Neurovascularly intact.  No skin changes.   Skin:     General: Skin is warm.      Capillary Refill: Capillary refill takes less than 2 seconds.   Neurological:      General: No focal deficit present.      Mental Status: She is alert and oriented to person, place, and time.      Sensory: No sensory deficit.      Motor: No weakness.   Psychiatric:         Mood and Affect: Mood normal.         ED Course             No results found for any visits on 06/08/21.  Medications   lactated ringers BOLUS 1,000 mL (has no administration in time range)   morphine (PF) injection 2 mg (has no administration in time range)   ketorolac (TORADOL) injection 15 mg (has no administration in time range)        Assessments & Plan (with Medical Decision Making)   Patient with history of sickle cell disease, presents to the emergency department for low back pain which she characterizes similar to her sickle cell disease in the past.    Differential diagnosis includes physically cell crisis, exacerbation of chronic pain, lumbosacral strain.  No bowel/bladder incontinence, motor weakness, urinary symptoms to suggest cauda equina or other spinal cord pathology.  No history of trauma, no obvious bony tenderness.    Symptoms most likely consistent with sickle cell pain crisis.  CBC, CMP, reticulocyte, are pending.  Patient  was given her usual cocktail medications including 1 L lactated Ringer's, 8 mg IV Zofran, 15 mg Toradol, 2 mg IV morphine every hour.  Will sign out to overnight provider with plan for reassessment after labs.    I have reviewed the nursing notes. I have reviewed the findings, diagnosis, plan and need for follow up with the patient.    New Prescriptions    No medications on file       Final diagnoses:   Sickle cell pain crisis (H)       --  Raul Diaz DO  [facility] EMERGENCY DEPARTMENT  6/8/2021     Raul Diaz DO  06/08/21 0146

## 2021-06-08 NOTE — TELEPHONE ENCOUNTER
Caron Martinez CPhT  [facility]  Oncology Pharmacy Liaison  [email or web address]  Phone: [phone]  Fax: [phone]

## 2021-06-08 NOTE — DISCHARGE INSTRUCTIONS
Please follow-up with your hematologist to discuss your pain crises    Return to the emergency department if your pain worsens or if you have any further concerns

## 2021-06-09 ENCOUNTER — HOSPITAL ENCOUNTER (EMERGENCY)
Facility: CLINIC | Age: 21
Discharge: HOME | End: 2021-06-10
Attending: EMERGENCY MEDICINE | Admitting: EMERGENCY MEDICINE
Payer: COMMERCIAL

## 2021-06-09 DIAGNOSIS — D57.00 SICKLE CELL PAIN CRISIS (H): ICD-10-CM

## 2021-06-09 PROCEDURE — 99285 EMERGENCY DEPT VISIT HI MDM: CPT | Performed by: EMERGENCY MEDICINE

## 2021-06-09 PROCEDURE — 99285 EMERGENCY DEPT VISIT HI MDM: CPT | Mod: 25

## 2021-06-09 RX ORDER — MORPHINE SULFATE 2 MG/ML
2 INJECTION, SOLUTION INTRAMUSCULAR; INTRAVENOUS
Status: COMPLETED | OUTPATIENT
Start: 2021-06-09 | End: 2021-06-10

## 2021-06-09 RX ORDER — SODIUM CHLORIDE, SODIUM LACTATE, POTASSIUM CHLORIDE, CALCIUM CHLORIDE 600; 310; 30; 20 MG/100ML; MG/100ML; MG/100ML; MG/100ML
1000 INJECTION, SOLUTION INTRAVENOUS CONTINUOUS
Status: DISCONTINUED | OUTPATIENT
Start: 2021-06-09 | End: 2021-06-10 | Stop reason: HOSPADM

## 2021-06-10 ENCOUNTER — HOME INFUSION (PRE-WILLOW HOME INFUSION) (OUTPATIENT)
Dept: PHARMACY | Facility: CLINIC | Age: 21
End: 2021-06-10

## 2021-06-10 ENCOUNTER — TELEPHONE (OUTPATIENT)
Dept: ONCOLOGY | Facility: CLINIC | Age: 21
End: 2021-06-10

## 2021-06-10 ENCOUNTER — PATIENT OUTREACH (OUTPATIENT)
Dept: ONCOLOGY | Facility: CLINIC | Age: 21
End: 2021-06-10

## 2021-06-10 VITALS
OXYGEN SATURATION: 95 % | HEART RATE: 99 BPM | TEMPERATURE: 98.8 F | DIASTOLIC BLOOD PRESSURE: 91 MMHG | SYSTOLIC BLOOD PRESSURE: 123 MMHG | RESPIRATION RATE: 18 BRPM

## 2021-06-10 DIAGNOSIS — D57.1 HB-SS DISEASE WITHOUT CRISIS (H): ICD-10-CM

## 2021-06-10 DIAGNOSIS — E83.111 IRON OVERLOAD DUE TO REPEATED RED BLOOD CELL TRANSFUSIONS: ICD-10-CM

## 2021-06-10 DIAGNOSIS — I69.351 HEMIPLEGIA AND HEMIPARESIS FOLLOWING CEREBRAL INFARCTION AFFECTING RIGHT DOMINANT SIDE (H): Primary | ICD-10-CM

## 2021-06-10 LAB
ALBUMIN SERPL-MCNC: 3.9 G/DL (ref 3.4–5)
ALBUMIN UR-MCNC: NEGATIVE MG/DL
ALP SERPL-CCNC: 84 U/L (ref 40–150)
ALT SERPL W P-5'-P-CCNC: 94 U/L (ref 0–50)
ANION GAP SERPL CALCULATED.3IONS-SCNC: 4 MMOL/L (ref 3–14)
APPEARANCE UR: CLEAR
AST SERPL W P-5'-P-CCNC: 87 U/L (ref 0–45)
BASOPHILS # BLD AUTO: 0.2 10E9/L (ref 0–0.2)
BASOPHILS NFR BLD AUTO: 1.6 %
BILIRUB SERPL-MCNC: 2.5 MG/DL (ref 0.2–1.3)
BILIRUB UR QL STRIP: NEGATIVE
BUN SERPL-MCNC: 7 MG/DL (ref 7–30)
CALCIUM SERPL-MCNC: 8.4 MG/DL (ref 8.5–10.1)
CHLORIDE SERPL-SCNC: 109 MMOL/L (ref 94–109)
CO2 SERPL-SCNC: 22 MMOL/L (ref 20–32)
COLOR UR AUTO: YELLOW
CREAT SERPL-MCNC: 0.57 MG/DL (ref 0.52–1.04)
DIFFERENTIAL METHOD BLD: ABNORMAL
EOSINOPHIL # BLD AUTO: 0.9 10E9/L (ref 0–0.7)
EOSINOPHIL NFR BLD AUTO: 7.1 %
ERYTHROCYTE [DISTWIDTH] IN BLOOD BY AUTOMATED COUNT: 22.1 % (ref 10–15)
GFR SERPL CREATININE-BSD FRML MDRD: >90 ML/MIN/{1.73_M2}
GLUCOSE SERPL-MCNC: 106 MG/DL (ref 70–99)
GLUCOSE UR STRIP-MCNC: NEGATIVE MG/DL
HCG SERPL QL: NEGATIVE
HCT VFR BLD AUTO: 23.8 % (ref 35–47)
HGB BLD-MCNC: 8.2 G/DL (ref 11.7–15.7)
HGB UR QL STRIP: NEGATIVE
IMM GRANULOCYTES # BLD: 0.1 10E9/L (ref 0–0.4)
IMM GRANULOCYTES NFR BLD: 0.4 %
KETONES UR STRIP-MCNC: NEGATIVE MG/DL
LACTATE BLD-SCNC: 0.9 MMOL/L (ref 0.7–2)
LEUKOCYTE ESTERASE UR QL STRIP: NEGATIVE
LYMPHOCYTES # BLD AUTO: 3 10E9/L (ref 0.8–5.3)
LYMPHOCYTES NFR BLD AUTO: 22.9 %
MCH RBC QN AUTO: 30.6 PG (ref 26.5–33)
MCHC RBC AUTO-ENTMCNC: 34.5 G/DL (ref 31.5–36.5)
MCV RBC AUTO: 89 FL (ref 78–100)
MONOCYTES # BLD AUTO: 1 10E9/L (ref 0–1.3)
MONOCYTES NFR BLD AUTO: 7.3 %
MUCOUS THREADS #/AREA URNS LPF: PRESENT /LPF
NEUTROPHILS # BLD AUTO: 7.9 10E9/L (ref 1.6–8.3)
NEUTROPHILS NFR BLD AUTO: 60.7 %
NITRATE UR QL: NEGATIVE
NRBC # BLD AUTO: 0.3 10*3/UL
NRBC BLD AUTO-RTO: 2 /100
PH UR STRIP: 5.5 PH (ref 5–7)
PLATELET # BLD AUTO: 436 10E9/L (ref 150–450)
POTASSIUM SERPL-SCNC: 4 MMOL/L (ref 3.4–5.3)
PROT SERPL-MCNC: 7.9 G/DL (ref 6.8–8.8)
RBC # BLD AUTO: 2.68 10E12/L (ref 3.8–5.2)
RBC #/AREA URNS AUTO: 0 /HPF (ref 0–2)
RETICS # AUTO: 600.1 10E9/L (ref 25–95)
RETICS/RBC NFR AUTO: 22.4 % (ref 0.5–2)
SODIUM SERPL-SCNC: 136 MMOL/L (ref 133–144)
SOURCE: ABNORMAL
SP GR UR STRIP: 1.01 (ref 1–1.03)
SQUAMOUS #/AREA URNS AUTO: <1 /HPF (ref 0–1)
UROBILINOGEN UR STRIP-MCNC: 2 MG/DL (ref 0–2)
WBC # BLD AUTO: 13 10E9/L (ref 4–11)
WBC #/AREA URNS AUTO: 1 /HPF (ref 0–5)

## 2021-06-10 PROCEDURE — 80053 COMPREHEN METABOLIC PANEL: CPT | Performed by: EMERGENCY MEDICINE

## 2021-06-10 PROCEDURE — 83605 ASSAY OF LACTIC ACID: CPT | Performed by: EMERGENCY MEDICINE

## 2021-06-10 PROCEDURE — 81001 URINALYSIS AUTO W/SCOPE: CPT | Performed by: EMERGENCY MEDICINE

## 2021-06-10 PROCEDURE — 85025 COMPLETE CBC W/AUTO DIFF WBC: CPT | Performed by: EMERGENCY MEDICINE

## 2021-06-10 PROCEDURE — 85045 AUTOMATED RETICULOCYTE COUNT: CPT | Performed by: EMERGENCY MEDICINE

## 2021-06-10 PROCEDURE — 96374 THER/PROPH/DIAG INJ IV PUSH: CPT

## 2021-06-10 PROCEDURE — 96376 TX/PRO/DX INJ SAME DRUG ADON: CPT

## 2021-06-10 PROCEDURE — 258N000003 HC RX IP 258 OP 636: Performed by: EMERGENCY MEDICINE

## 2021-06-10 PROCEDURE — 250N000011 HC RX IP 250 OP 636: Performed by: EMERGENCY MEDICINE

## 2021-06-10 PROCEDURE — 84703 CHORIONIC GONADOTROPIN ASSAY: CPT | Performed by: EMERGENCY MEDICINE

## 2021-06-10 PROCEDURE — 96361 HYDRATE IV INFUSION ADD-ON: CPT

## 2021-06-10 RX ORDER — DEFERASIROX 360 MG/1
1440 TABLET, FILM COATED ORAL EVERY EVENING
Qty: 120 TABLET | Refills: 4 | Status: SHIPPED | OUTPATIENT
Start: 2021-06-10 | End: 2021-09-20

## 2021-06-10 RX ORDER — HEPARIN SODIUM (PORCINE) LOCK FLUSH IV SOLN 100 UNIT/ML 100 UNIT/ML
5 SOLUTION INTRAVENOUS
Status: DISCONTINUED | OUTPATIENT
Start: 2021-06-10 | End: 2021-06-10 | Stop reason: HOSPADM

## 2021-06-10 RX ADMIN — MORPHINE SULFATE 2 MG: 2 INJECTION, SOLUTION INTRAMUSCULAR; INTRAVENOUS at 02:40

## 2021-06-10 RX ADMIN — SODIUM CHLORIDE, POTASSIUM CHLORIDE, SODIUM LACTATE AND CALCIUM CHLORIDE 1000 ML: 600; 310; 30; 20 INJECTION, SOLUTION INTRAVENOUS at 00:23

## 2021-06-10 RX ADMIN — HEPARIN 5 ML: 100 SYRINGE at 04:01

## 2021-06-10 RX ADMIN — MORPHINE SULFATE 2 MG: 2 INJECTION, SOLUTION INTRAMUSCULAR; INTRAVENOUS at 01:40

## 2021-06-10 RX ADMIN — MORPHINE SULFATE 2 MG: 2 INJECTION, SOLUTION INTRAMUSCULAR; INTRAVENOUS at 00:23

## 2021-06-10 ASSESSMENT — ENCOUNTER SYMPTOMS
PSYCHIATRIC NEGATIVE: 1
GASTROINTESTINAL NEGATIVE: 1
CONSTIPATION: 0
HEADACHES: 0
NAUSEA: 0
BACK PAIN: 1
ARTHRALGIAS: 1
WEAKNESS: 0
PALPITATIONS: 0
NECK PAIN: 0
COUGH: 0
FEVER: 0
LIGHT-HEADEDNESS: 0
DIARRHEA: 0
NECK STIFFNESS: 0
DIAPHORESIS: 0
FATIGUE: 0
JOINT SWELLING: 0
SHORTNESS OF BREATH: 0
SEIZURES: 0
VOMITING: 0
CHILLS: 0
ABDOMINAL PAIN: 0

## 2021-06-10 NOTE — DISCHARGE INSTRUCTIONS
TODAY'S VISIT:  - You should discuss all imaging/radiology tests and laboratory tests that were performed during this visit with your usual providers to ensure you continue to improve and do not need any further evaluation, testing or management.   - Please call your Primary Care team to discuss and arrange a follow-up appointment.     FOLLOW-UP:  Please make an appointment to follow up with:  - Your Primary Care Provider   - If you do not have a primary care provider, you can be seen in follow-up and establish care with one of our providers by calling of the the clinics below:  --- [facility] (phone: [phone])  --- Primary Care / [facility] (phone: [phone])   - Have your provider review the results from today's visit with you again to make sure no further follow-up or additional testing is needed based on those results.     OTHER INSTRUCTIONS:  - Do your best to stay hydrated.    RETURN TO THE EMERGENCY DEPARTMENT  Return to the Emergency Department immediately for any new or worsening symptoms or any concerns.     Remember that you can always come back to the Emergency Department if you are not able to see your regular doctor in the amount of time listed above, if you get any new symptoms, or if there is anything that worries you.

## 2021-06-10 NOTE — ED PROVIDER NOTES
[place] EMERGENCY DEPARTMENT ([facility])  June 9, 2021 ED 22  History     Chief Complaint   Patient presents with     Sickle Cell Pain Crisis     The history is provided by the patient and medical records.     Jennifer Cervantes is a 21 year old female, well known to the Emergency Department with PMHx notable for sickle cell disease complicated by chronic pain, prior CVAs, et al., who presents to the emergency department with pain consistent w/ previous sickle cell pain flares.     In review of EMR has had multiple ED visits in the past month for sickle cell pain management.  She is in the process of getting established again with the infusion center for pain management, but this has been an ongoing process (pt reports logistically difficult as patients from the [facility] are also having to use the local infusion center and it has been difficult to get in for treatment when she has symptoms that exceed her usual home medications.     She has had follow-up with [facility], last visit was 5 days ago on 6/4/2021.  Tried managing this at home with pain medications but was unsuccessful.  Epic records reviewed, patient is on Roxicodone 15 mg tablet every 4-6 hours as needed for severe pain (goal is 1 to 4/day, max at 6/day) OxyContin 10 mg every 12 hours as well as Tylenol 650 mg p.o. every 6 hours as needed for mild pain. Was previously on aspirin, celebrex, ibuprofen etc. As needed, but hasn't been on those as has been on anticoagulation recently (continues on anticoagulation now, Care Plan indicates to avoid the previously recommended Toradol in this setting).     Pain located diffuse low back to both hips/legs and both arms, (worse in the low back/hip/legs). This is exactly the same as usual per patient. No new locations or features. No new associated symptoms. No particular aggrevating or alleviating factors. Symptoms tend to get worse w/ cold, and so she's been trying to stay warm, do warm  baths, etc. Has been proactively trying to stay hydrated as dehydration can also be a trigger for her, and she's wondering if not having those other additional meds/not having infusion center access is making it more difficult to get additional relieve/turn corner on symptoms.     No traumas or falls, no fevers or chills. No CP or breathing symptoms. No HEENT sx's. No abdominal or GI/ symptoms. No focal joint issues. No neck or upper back symptoms or issues. Diffuse across low back, to some degree in bilateral flank/mid back, no lateral or anterior symptoms. No numbness, tingling or focal weakness. No new incontinence. No sweats, immuncompromising conditions/features, no IVDU. No known cancers. No other symptoms or complaints at this time. Please see ROS for further details.    PAST MEDICAL HISTORY:   Past Medical History:   Diagnosis Date     Anxiety      Bleeding disorder (H)      Blood clotting disorder (H)      Cerebral infarction (H) 2015     Cognitive developmental delay     low IQ. 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 (H)     right hand contractures     Iron overload due to repeated red blood cell transfusions      Migraines      Multiple subsegmental pulmonary emboli without acute cor pulmonale (H) 02/01/2021     Oppositional defiant behavior      Superficial venous thrombosis of arm, right 03/25/2021     Uncomplicated asthma        PAST SURGICAL HISTORY:   Past Surgical History:   Procedure Laterality Date     AS INSERT TUNNELED CV 2 CATH W/O PORT/PUMP       C BREAST REDUCTION (INCLUDES LIPO) TIER 3 Bilateral 04/23/2019     CHOLECYSTECTOMY       INSERT PORT VASCULAR ACCESS Left 4/21/2021    Procedure: INSERTION, VASCULAR ACCESS PORT (NOT SURE ON SIDE UNTIL REMOVAL);  Surgeon: Rajan More MD;  Location: [facility] OR     IR CHEST PORT PLACEMENT > 5 YRS OF AGE  4/21/2021      IR CVC NON TUNNEL LINE REMOVAL  5/6/2021     IR CVC NON TUNNEL PLACEMENT  04/07/2020     IR CVC NON TUNNEL PLACEMENT  4/30/2021     IR PORT REMOVAL LEFT  4/21/2021     REMOVE PORT VASCULAR ACCESS Left 4/21/2021    Procedure: REMOVAL, VASCULAR ACCESS PORT LEFT;  Surgeon: Rajan More MD;  Location: [facility] OR     REPAIR TENDON ELBOW Right 10/02/2019    Procedure: Right Elbow Flexor Lengthening, Flexor Pronator Slide Of Wrist and Finger, Thumb Adductor Lengthening;  Surgeon: Anai Franco MD;  Location: [facility] OR     TONSILLECTOMY Bilateral 10/02/2019    Procedure: Bilateral Tonsillectomy;  Surgeon: Farhana Guy MD;  Location: [facility] OR       Past medical history, past surgical history, medications, and allergies were reviewed with the patient.     FAMILY HISTORY:   Family History   Problem Relation Age of Onset     Sickle Cell Trait Mother      Hypertension Mother      Asthma Mother      Sickle Cell Trait Father        SOCIAL HISTORY:   Social History     Tobacco Use     Smoking status: Never Smoker     Smokeless tobacco: Never Used   Substance Use Topics     Alcohol use: Not Currently     Alcohol/week: 0.0 standard drinks     Social history was reviewed with the patient.       Patient's Medications   New Prescriptions    No medications on file   Previous Medications    ACETAMINOPHEN (TYLENOL) 325 MG TABLET    Take 2 tablets (650 mg) by mouth every 6 hours as needed for mild pain    ALBUTEROL (PROAIR HFA/PROVENTIL HFA/VENTOLIN HFA) 108 (90 BASE) MCG/ACT INHALER    Inhale 2 puffs into the lungs every 6 hours as needed for shortness of breath / dyspnea or wheezing    ALBUTEROL (PROVENTIL) (2.5 MG/3ML) 0.083% NEB SOLUTION    Take 1 vial (2.5 mg) by nebulization every 6 hours as needed for shortness of breath / dyspnea or wheezing    ARIPIPRAZOLE (ABILIFY) 2 MG TABLET    Take 1 tablet (2 mg) by mouth daily    BUDESONIDE-FORMOTEROL (SYMBICORT) 160-4.5 MCG/ACT INHALER    Inhale 1 puff into the lungs every  evening    DICLOFENAC (VOLTAREN) 1 % TOPICAL GEL    Apply 4 g topically 4 times daily as needed for moderate pain Apply to back, legs, and arms for pain    DIPHENHYDRAMINE (BENADRYL) 25 MG CAPSULE    Take 1-2 capsules (25-50 mg) by mouth nightly as needed for sleep    EPINEPHRINE (ANY BX GENERIC EQUIV) 0.3 MG/0.3ML INJECTION 2-PACK    Inject 0.3 mLs (0.3 mg) into the muscle as needed for anaphylaxis    HYDROXYUREA 1000 MG TABS    Take 2,000 mg by mouth daily    HYDROXYZINE (ATARAX) 25 MG TABLET    Take 1 tablet (25 mg) by mouth 3 times daily as needed for anxiety    JADENU 360 MG TABLET    Take 4 tablets (1,440 mg) by mouth every evening    LIDOCAINE-PRILOCAINE (EMLA) 2.5-2.5 % EXTERNAL CREAM    Apply topically as needed for moderate pain    MEDROXYPROGESTERONE (DEPO-PROVERA) 150 MG/ML IM INJECTION    Inject 150 mg into the muscle    NALOXONE (NARCAN) 4 MG/0.1ML NASAL SPRAY    Spray 1 spray (4 mg) into one nostril alternating nostrils once as needed for opioid reversal every 2-3 minutes until assistance arrives    ONDANSETRON (ZOFRAN) 8 MG TABLET        OXYCODONE (OXYCONTIN) 10 MG 12 HR TABLET    Take 1 tablet (10 mg) by mouth every 12 hours    OXYCODONE IR (ROXICODONE) 15 MG TABLET    Take 1 tablet (15mg) by mouth every 4-6 hours as needed for severe pain. Goal 4 per day. Max 6 per day.    RIVAROXABAN ANTICOAGULANT (XARELTO ANTICOAGULANT) 20 MG TABS TABLET    Take 1 tablet (20 mg) by mouth daily (with dinner) Starting on 5/29 after completing your 15mg twice daily dosing on 5/28    SERTRALINE (ZOLOFT) 100 MG TABLET    Take 2 tablets (200 mg) by mouth daily   Modified Medications    No medications on file   Discontinued Medications    No medications on file          Allergies   Allergen Reactions     Contrast Dye      Hives and breathing issues     Fish-Derived Products Hives     Seafood Hives     Diagnostic X-Ray Materials      Gadolinium         Review of Systems   Constitutional: Negative for chills, diaphoresis,  fatigue and fever.   HENT: Negative.    Respiratory: Negative for cough and shortness of breath.    Cardiovascular: Negative for chest pain and palpitations.   Gastrointestinal: Negative.  Negative for abdominal pain, constipation, diarrhea, nausea and vomiting.   Genitourinary: Negative.    Musculoskeletal: Positive for arthralgias and back pain. Negative for joint swelling, neck pain and neck stiffness.        Pos for diffuse back, bilateral arm and leg pain consistent with previous sickle cell pain flares   Skin: Negative.    Allergic/Immunologic: Negative for immunocompromised state.   Neurological: Negative for seizures, syncope, weakness, light-headedness and headaches.   Psychiatric/Behavioral: Negative.    All other systems reviewed and are negative.    A complete review of systems was performed with pertinent positives and negatives noted in the HPI, and all other systems negative.    Physical Exam          Physical Exam  CONSTITUTIONAL: Well-developed and well-nourished. Awake and alert. Non-toxic appearance. No acute distress.   HENT:   - Head: Normocephalic and atraumatic.   - Ears: Hearing and external ear grossly normal.   - Nose: Nose normal. No rhinorrhea. No epistaxis.   - Mouth/Throat: MMM  EYES: Conjunctivae and lids are normal. No scleral icterus.   NECK: Normal range of motion and phonation normal. Neck supple.  No tracheal deviation, no stridor. No edema or erythema noted.  CARDIOVASCULAR: Normal rate, regular rhythm and no appreciable abnormal heart sounds.  PULMONARY/CHEST: Normal work of breathing. No accessory muscle usage or stridor. No respiratory distress.  No appreciable abnormal breath sounds.  ABDOMEN: Soft, non-distended. No tenderness. No rigidity, rebound or guarding.   MUSCULOSKELETAL: Extremities warm and seemingly well perfused. No particular swelling. No midline of focal/bony tenderness. Strength/sensation intact.   NEUROLOGIC: Awake, alert. Not disoriented. She displays no  atrophy and no tremor. Normal tone. No seizure activity. GCS 15  SKIN: Skin is warm and dry. No rash noted. No diaphoresis. No pallor.   PSYCHIATRIC: Normal mood and affect. Speech and behavior normal. Thought processes linear. Cognition and memory are normal.        Assessments & Plan (with Medical Decision Making)   IMPRESSION: 21 year old female w/ PMH notable for sickle cell disease complicated by chronic pain, prior CVAs, et al., who presents to the emergency department with pain consistent w/ previous sickle cell pain flares.  Clinically, patient appears nontoxic, NAD. Otherwise on examination, no acute findings. Ddx includes, but not limited to, sickle cell crisis, acute on chronic pain flare, less liikely UTI/pyelo, kidney stone, etc. No other acute findings, and given symptoms so consistent w/ her baseline    PLAN: Labs, urine studies, symptom management  - Disposition pending ED course.     RESULTS:  - Labs: WBC is improving from previous.  Hgb near previous baseline. Lactate normal. Negative pregnancy  - Urine: No acute findings    INTERVENTIONS:   - IVF  - IV Dilaudid (according to ED Care Plan)    RE-EVALUATION:  - Pt otherwise continues to do well here in the ED, no acute issues or apparent concerning changes in vitals or clinical appearance.    DISCUSSIONS:   - w/ Patient: I have reviewed the available findings, tentative plan, with the patient. She expressed understanding and agreement with this plan. All questions answered to the best of our ability at this time.     SIGNOUT:  - Patient signed out to overnight EM Physician.  - Impression at shift change: Sickle cell disease, pain flare  - Pending at shift change: Pain/clinical reassessments, reticulocyte count  - Tentative plan: Reassess pain, F/U urine studies        ______________________________________________________________________           6/9/2021   [facility] EMERGENCY DEPARTMENT     Reta Miramontes MD  06/10/21 [id number]

## 2021-06-10 NOTE — ED TRIAGE NOTES
Patient arrives ambulatory to the ED with c/o sickle cell pain crisis. Patient report torrey to the arms and legs bilaterally. Took home pain meds a few hours before coming in.

## 2021-06-10 NOTE — ED PROVIDER NOTES
Emergency Department Patient Sign-out       Brief HPI:  This is a 21 year old female signed out to me by Dr. Miramontes  See initial ED Provider note for details of the presentation.      Exam:   Patient Vitals for the past 24 hrs:   BP Temp Temp src Pulse Resp SpO2   06/10/21 0200 114/60 -- -- 103 -- 95 %   06/10/21 0100 111/62 -- -- 103 -- 96 %   06/10/21 0046 -- 98.8  F (37.1  C) Oral -- 20 --   06/10/21 0000 130/76 -- -- 104 -- 96 %           ED RESULTS:   Results for orders placed or performed during the hospital encounter of 06/09/21 (from the past 24 hour(s))   CBC with platelets differential     Status: Abnormal (In process)    Collection Time: 06/10/21 12:00 AM   Result Value Ref Range    WBC 13.0 (H) 4.0 - 11.0 10e9/L    RBC Count 2.68 (L) 3.8 - 5.2 10e12/L    Hemoglobin 8.2 (L) 11.7 - 15.7 g/dL    Hematocrit 23.8 (L) 35.0 - 47.0 %    MCV 89 78 - 100 fl    MCH 30.6 26.5 - 33.0 pg    MCHC 34.5 31.5 - 36.5 g/dL    RDW 22.1 (H) 10.0 - 15.0 %    Platelet Count 436 150 - 450 10e9/L    Diff Method PENDING    Comprehensive metabolic panel     Status: Abnormal    Collection Time: 06/10/21 12:00 AM   Result Value Ref Range    Sodium 136 133 - 144 mmol/L    Potassium 4.0 3.4 - 5.3 mmol/L    Chloride 109 94 - 109 mmol/L    Carbon Dioxide 22 20 - 32 mmol/L    Anion Gap 4 3 - 14 mmol/L    Glucose 106 (H) 70 - 99 mg/dL    Urea Nitrogen 7 7 - 30 mg/dL    Creatinine 0.57 0.52 - 1.04 mg/dL    GFR Estimate >90 >60 mL/min/[1.73_m2]    GFR Estimate If Black >90 >60 mL/min/[1.73_m2]    Calcium 8.4 (L) 8.5 - 10.1 mg/dL    Bilirubin Total 2.5 (H) 0.2 - 1.3 mg/dL    Albumin 3.9 3.4 - 5.0 g/dL    Protein Total 7.9 6.8 - 8.8 g/dL    Alkaline Phosphatase 84 40 - 150 U/L    ALT 94 (H) 0 - 50 U/L    AST 87 (H) 0 - 45 U/L   Lactic acid whole blood     Status: None    Collection Time: 06/10/21 12:00 AM   Result Value Ref Range    Lactic Acid 0.9 0.7 - 2.0 mmol/L   HCG qualitative Blood     Status: None    Collection Time: 06/10/21  12:00 AM   Result Value Ref Range    HCG Qualitative Serum Negative NEG^Negative   UA with Microscopic     Status: Abnormal    Collection Time: 06/10/21 12:00 AM   Result Value Ref Range    Color Urine Yellow     Appearance Urine Clear     Glucose Urine Negative NEG^Negative mg/dL    Bilirubin Urine Negative NEG^Negative    Ketones Urine Negative NEG^Negative mg/dL    Specific Gravity Urine 1.012 1.003 - 1.035    Blood Urine Negative NEG^Negative    pH Urine 5.5 5.0 - 7.0 pH    Protein Albumin Urine Negative NEG^Negative mg/dL    Urobilinogen mg/dL 2.0 0.0 - 2.0 mg/dL    Nitrite Urine Negative NEG^Negative    Leukocyte Esterase Urine Negative NEG^Negative    Source Midstream Urine     WBC Urine 1 0 - 5 /HPF    RBC Urine 0 0 - 2 /HPF    Squamous Epithelial /HPF Urine <1 0 - 1 /HPF    Mucous Urine Present (A) NEG^Negative /LPF       ED MEDICATIONS:   Medications   lactated ringers infusion (has no administration in time range)   lactated ringers BOLUS 1,000 mL (0 mLs Intravenous Stopped 6/10/21 0225)   morphine (PF) injection 2 mg (2 mg Intravenous Given 6/10/21 0240)         Impression:    ICD-10-CM    1. Sickle cell pain crisis (H)  D57.00 HCG qualitative Blood       Plan:    Pending studies include reevaluation after pain medication.  On reevaluation patient resting comfortably, reports improvement of her pain.  At this time plan for discharge home with continued outpatient follow-up, pain control, and follow-up closely with her outpatient providers.  Return precautions discussed.  Patient understands agrees with plan.      MD Mario Kruse Linsey Gail, MD  06/10/21 [id number]

## 2021-06-11 ENCOUNTER — NURSE TRIAGE (OUTPATIENT)
Dept: NURSING | Facility: CLINIC | Age: 21
End: 2021-06-11

## 2021-06-11 ENCOUNTER — HOSPITAL ENCOUNTER (EMERGENCY)
Facility: CLINIC | Age: 21
Discharge: HOME | End: 2021-06-12
Attending: EMERGENCY MEDICINE | Admitting: EMERGENCY MEDICINE
Payer: COMMERCIAL

## 2021-06-11 ENCOUNTER — PRE VISIT (OUTPATIENT)
Dept: PULMONOLOGY | Facility: CLINIC | Age: 21
End: 2021-06-11

## 2021-06-11 ENCOUNTER — HOSPITAL ENCOUNTER (EMERGENCY)
Facility: CLINIC | Age: 21
Discharge: HOME | End: 2021-06-11
Attending: EMERGENCY MEDICINE | Admitting: EMERGENCY MEDICINE
Payer: COMMERCIAL

## 2021-06-11 ENCOUNTER — APPOINTMENT (OUTPATIENT)
Dept: ULTRASOUND IMAGING | Facility: CLINIC | Age: 21
End: 2021-06-11
Attending: EMERGENCY MEDICINE
Payer: COMMERCIAL

## 2021-06-11 VITALS
WEIGHT: 168 LBS | BODY MASS INDEX: 28.68 KG/M2 | SYSTOLIC BLOOD PRESSURE: 129 MMHG | TEMPERATURE: 97.4 F | OXYGEN SATURATION: 97 % | RESPIRATION RATE: 18 BRPM | HEART RATE: 107 BPM | HEIGHT: 64 IN | DIASTOLIC BLOOD PRESSURE: 71 MMHG

## 2021-06-11 DIAGNOSIS — G89.4 CHRONIC PAIN SYNDROME: ICD-10-CM

## 2021-06-11 DIAGNOSIS — D57.00 HB-SS DISEASE WITH CRISIS (H): ICD-10-CM

## 2021-06-11 DIAGNOSIS — D57.00 SICKLE CELL PAIN CRISIS (H): ICD-10-CM

## 2021-06-11 LAB
ALBUMIN SERPL-MCNC: 3.9 G/DL (ref 3.4–5)
ALBUMIN UR-MCNC: NEGATIVE MG/DL
ALP SERPL-CCNC: 82 U/L (ref 40–150)
ALT SERPL W P-5'-P-CCNC: 107 U/L (ref 0–50)
ANION GAP SERPL CALCULATED.3IONS-SCNC: 2 MMOL/L (ref 3–14)
APPEARANCE UR: CLEAR
AST SERPL W P-5'-P-CCNC: 124 U/L (ref 0–45)
BASOPHILS # BLD AUTO: 0.3 10E9/L (ref 0–0.2)
BASOPHILS NFR BLD AUTO: 3 %
BILIRUB SERPL-MCNC: 2.6 MG/DL (ref 0.2–1.3)
BILIRUB UR QL STRIP: NEGATIVE
BUN SERPL-MCNC: 8 MG/DL (ref 7–30)
CALCIUM SERPL-MCNC: 8.8 MG/DL (ref 8.5–10.1)
CHLORIDE SERPL-SCNC: 110 MMOL/L (ref 94–109)
CO2 SERPL-SCNC: 23 MMOL/L (ref 20–32)
COLOR UR AUTO: YELLOW
CREAT SERPL-MCNC: 0.55 MG/DL (ref 0.52–1.04)
DIFFERENTIAL METHOD BLD: ABNORMAL
DIFFERENTIAL METHOD BLD: NORMAL
EOSINOPHIL # BLD AUTO: 1.3 10E9/L (ref 0–0.7)
EOSINOPHIL NFR BLD AUTO: 15 %
ERYTHROCYTE [DISTWIDTH] IN BLOOD BY AUTOMATED COUNT: 21.5 % (ref 10–15)
ERYTHROCYTE [DISTWIDTH] IN BLOOD BY AUTOMATED COUNT: NORMAL % (ref 10–15)
GFR SERPL CREATININE-BSD FRML MDRD: >90 ML/MIN/{1.73_M2}
GLUCOSE SERPL-MCNC: 90 MG/DL (ref 70–99)
GLUCOSE UR STRIP-MCNC: NEGATIVE MG/DL
HCG SERPL QL: NEGATIVE
HCT VFR BLD AUTO: 24.9 % (ref 35–47)
HCT VFR BLD AUTO: NORMAL % (ref 35–47)
HGB BLD-MCNC: 8.6 G/DL (ref 11.7–15.7)
HGB BLD-MCNC: NORMAL G/DL (ref 11.7–15.7)
HGB UR QL STRIP: NEGATIVE
KETONES UR STRIP-MCNC: NEGATIVE MG/DL
LEUKOCYTE ESTERASE UR QL STRIP: NEGATIVE
LYMPHOCYTES # BLD AUTO: 2.6 10E9/L (ref 0.8–5.3)
LYMPHOCYTES NFR BLD AUTO: 30 %
MCH RBC QN AUTO: 31 PG (ref 26.5–33)
MCH RBC QN AUTO: NORMAL PG (ref 26.5–33)
MCHC RBC AUTO-ENTMCNC: 34.5 G/DL (ref 31.5–36.5)
MCHC RBC AUTO-ENTMCNC: NORMAL G/DL (ref 31.5–36.5)
MCV RBC AUTO: 90 FL (ref 78–100)
MCV RBC AUTO: NORMAL FL (ref 78–100)
MONOCYTES # BLD AUTO: 0.5 10E9/L (ref 0–1.3)
MONOCYTES NFR BLD AUTO: 6 %
NEUTROPHILS # BLD AUTO: 4 10E9/L (ref 1.6–8.3)
NEUTROPHILS NFR BLD AUTO: 46 %
NITRATE UR QL: NEGATIVE
PH UR STRIP: 6 PH (ref 5–7)
PLATELET # BLD AUTO: 441 10E9/L (ref 150–450)
PLATELET # BLD AUTO: NORMAL 10E9/L (ref 150–450)
PLATELET # BLD EST: ABNORMAL 10*3/UL
POIKILOCYTOSIS BLD QL SMEAR: ABNORMAL
POLYCHROMASIA BLD QL SMEAR: ABNORMAL
POTASSIUM SERPL-SCNC: 5.2 MMOL/L (ref 3.4–5.3)
PROT SERPL-MCNC: 8.2 G/DL (ref 6.8–8.8)
RBC # BLD AUTO: 2.77 10E12/L (ref 3.8–5.2)
RBC # BLD AUTO: NORMAL 10E12/L (ref 3.8–5.2)
SICKLE CELLS BLD QL SMEAR: ABNORMAL
SODIUM SERPL-SCNC: 136 MMOL/L (ref 133–144)
SOURCE: NORMAL
SP GR UR STRIP: 1.01 (ref 1–1.03)
UROBILINOGEN UR STRIP-MCNC: NORMAL MG/DL (ref 0–2)
WBC # BLD AUTO: 8.7 10E9/L (ref 4–11)
WBC # BLD AUTO: NORMAL 10E9/L (ref 4–11)

## 2021-06-11 PROCEDURE — 96376 TX/PRO/DX INJ SAME DRUG ADON: CPT | Performed by: EMERGENCY MEDICINE

## 2021-06-11 PROCEDURE — 85025 COMPLETE CBC W/AUTO DIFF WBC: CPT | Performed by: EMERGENCY MEDICINE

## 2021-06-11 PROCEDURE — 999N000127 HC STATISTIC PERIPHERAL IV START W US GUIDANCE

## 2021-06-11 PROCEDURE — 258N000003 HC RX IP 258 OP 636: Performed by: EMERGENCY MEDICINE

## 2021-06-11 PROCEDURE — 99285 EMERGENCY DEPT VISIT HI MDM: CPT | Mod: 25

## 2021-06-11 PROCEDURE — 93971 EXTREMITY STUDY: CPT | Mod: RT

## 2021-06-11 PROCEDURE — 99285 EMERGENCY DEPT VISIT HI MDM: CPT | Mod: 25 | Performed by: EMERGENCY MEDICINE

## 2021-06-11 PROCEDURE — 96361 HYDRATE IV INFUSION ADD-ON: CPT | Performed by: EMERGENCY MEDICINE

## 2021-06-11 PROCEDURE — 93010 ELECTROCARDIOGRAM REPORT: CPT | Performed by: EMERGENCY MEDICINE

## 2021-06-11 PROCEDURE — 84703 CHORIONIC GONADOTROPIN ASSAY: CPT | Performed by: EMERGENCY MEDICINE

## 2021-06-11 PROCEDURE — 93005 ELECTROCARDIOGRAM TRACING: CPT

## 2021-06-11 PROCEDURE — 99285 EMERGENCY DEPT VISIT HI MDM: CPT | Performed by: EMERGENCY MEDICINE

## 2021-06-11 PROCEDURE — 250N000011 HC RX IP 250 OP 636: Performed by: EMERGENCY MEDICINE

## 2021-06-11 PROCEDURE — 81003 URINALYSIS AUTO W/O SCOPE: CPT | Performed by: EMERGENCY MEDICINE

## 2021-06-11 PROCEDURE — 80053 COMPREHEN METABOLIC PANEL: CPT | Performed by: EMERGENCY MEDICINE

## 2021-06-11 PROCEDURE — 93971 EXTREMITY STUDY: CPT | Mod: 26

## 2021-06-11 PROCEDURE — 96374 THER/PROPH/DIAG INJ IV PUSH: CPT | Performed by: EMERGENCY MEDICINE

## 2021-06-11 RX ORDER — SODIUM CHLORIDE 9 MG/ML
INJECTION, SOLUTION INTRAVENOUS CONTINUOUS
Status: DISCONTINUED | OUTPATIENT
Start: 2021-06-11 | End: 2021-06-11 | Stop reason: HOSPADM

## 2021-06-11 RX ORDER — MORPHINE SULFATE 4 MG/ML
2 INJECTION, SOLUTION INTRAMUSCULAR; INTRAVENOUS
Status: COMPLETED | OUTPATIENT
Start: 2021-06-11 | End: 2021-06-11

## 2021-06-11 RX ORDER — ONDANSETRON 2 MG/ML
4 INJECTION INTRAMUSCULAR; INTRAVENOUS ONCE
Status: DISCONTINUED | OUTPATIENT
Start: 2021-06-11 | End: 2021-06-11 | Stop reason: HOSPADM

## 2021-06-11 RX ORDER — KETOROLAC TROMETHAMINE 15 MG/ML
15 INJECTION, SOLUTION INTRAMUSCULAR; INTRAVENOUS ONCE
Status: DISCONTINUED | OUTPATIENT
Start: 2021-06-11 | End: 2021-06-11 | Stop reason: HOSPADM

## 2021-06-11 RX ADMIN — MORPHINE SULFATE 2 MG: 4 INJECTION INTRAVENOUS at 04:45

## 2021-06-11 RX ADMIN — MORPHINE SULFATE 2 MG: 4 INJECTION INTRAVENOUS at 06:00

## 2021-06-11 RX ADMIN — SODIUM CHLORIDE 1000 ML: 9 INJECTION, SOLUTION INTRAVENOUS at 03:37

## 2021-06-11 RX ADMIN — MORPHINE SULFATE 2 MG: 4 INJECTION INTRAVENOUS at 03:49

## 2021-06-11 ASSESSMENT — MIFFLIN-ST. JEOR: SCORE: 1507.04

## 2021-06-11 NOTE — ED TRIAGE NOTES
Pt arrives to ED with c/o sickle cell pain crisis in lower back and sides. On the right side she has very sharp pain, which is new for while sick cell. Pt states that she more swelling the right arm and she does have history DVT.

## 2021-06-11 NOTE — DISCHARGE INSTRUCTIONS
Please call your oncologist to discuss recent emergency department visits and plan from here.    Return to the emergency department for any further concerns, fevers, chest pain or shortness of breath

## 2021-06-11 NOTE — ED PROVIDER NOTES
ED Provider Note  [facility]      History     Chief Complaint   Patient presents with     Sickle Cell Pain Crisis     The history is provided by the patient.     Jennifer Cervantes is a 21 year old female with a medical history significant for sickle cell disease, prior CVAs and DVTs (on Xarelto) who presents to the Emergency Department for evaluation of pain in her lower back and sides that feels consistent with a sickle cell pain crisis.  Patient reports that she has been taking her home pain medications, but has not had improvement in her pain.  Patient is also here with concerned that she may have another DVT.  She reports that she is having right forearm pain and swelling that feels similar to when she was first diagnosed with a DVT in the past.  Patient reports that she has been taking her Xarelto as prescribed.  She denies any numbness or tingling in her right hand.  She denies any falls or injuries to her right arm.  She denies any abdominal pain, nausea, vomiting or fevers.    Past Medical History  Past Medical History:   Diagnosis Date     Anxiety      Bleeding disorder (H)      Blood clotting disorder (H)      Cerebral infarction (H) 2015     Cognitive developmental delay     low IQ. 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 (H)     right hand contractures     Iron overload due to repeated red blood cell transfusions      Migraines      Multiple subsegmental pulmonary emboli without acute cor pulmonale (H) 02/01/2021     Oppositional defiant behavior      Superficial venous thrombosis of arm, right 03/25/2021     Uncomplicated asthma      Past Surgical History:   Procedure Laterality Date     AS INSERT TUNNELED CV 2 CATH W/O PORT/PUMP       C BREAST REDUCTION (INCLUDES LIPO) TIER 3 Bilateral 04/23/2019     CHOLECYSTECTOMY       INSERT PORT VASCULAR  ACCESS Left 4/21/2021    Procedure: INSERTION, VASCULAR ACCESS PORT (NOT SURE ON SIDE UNTIL REMOVAL);  Surgeon: Rajan More MD;  Location: [facility] OR     IR CHEST PORT PLACEMENT > 5 YRS OF AGE  4/21/2021     IR CVC NON TUNNEL LINE REMOVAL  5/6/2021     IR CVC NON TUNNEL PLACEMENT  04/07/2020     IR CVC NON TUNNEL PLACEMENT  4/30/2021     IR PORT REMOVAL LEFT  4/21/2021     REMOVE PORT VASCULAR ACCESS Left 4/21/2021    Procedure: REMOVAL, VASCULAR ACCESS PORT LEFT;  Surgeon: Rajan More MD;  Location: [facility] OR     REPAIR TENDON ELBOW Right 10/02/2019    Procedure: Right Elbow Flexor Lengthening, Flexor Pronator Slide Of Wrist and Finger, Thumb Adductor Lengthening;  Surgeon: Anai Franco MD;  [facility]     TONSILLECTOMY Bilateral 10/02/2019    Procedure: Bilateral Tonsillectomy;  Surgeon: Farhana Guy MD;  [facility]     acetaminophen (TYLENOL) 325 MG tablet  albuterol (PROAIR HFA/PROVENTIL HFA/VENTOLIN HFA) 108 (90 Base) MCG/ACT inhaler  albuterol (PROVENTIL) (2.5 MG/3ML) 0.083% neb solution  ARIPiprazole (ABILIFY) 2 MG tablet  budesonide-formoterol (SYMBICORT) 160-4.5 MCG/ACT Inhaler  diclofenac (VOLTAREN) 1 % topical gel  diphenhydrAMINE (BENADRYL) 25 MG capsule  EPINEPHrine (ANY BX GENERIC EQUIV) 0.3 MG/0.3ML injection 2-pack  Hydroxyurea 1000 MG TABS  hydrOXYzine (ATARAX) 25 MG tablet  JADENU 360 MG tablet  lidocaine-prilocaine (EMLA) 2.5-2.5 % external cream  medroxyPROGESTERone (DEPO-PROVERA) 150 MG/ML IM injection  naloxone (NARCAN) 4 MG/0.1ML nasal spray  ondansetron (ZOFRAN) 8 MG tablet  oxyCODONE (OXYCONTIN) 10 MG 12 hr tablet  oxyCODONE IR (ROXICODONE) 15 MG tablet  rivaroxaban ANTICOAGULANT (XARELTO ANTICOAGULANT) 20 MG TABS tablet  sertraline (ZOLOFT) 100 MG tablet      Allergies   Allergen Reactions     Contrast Dye      Hives and breathing issues     Fish-Derived Products Hives     Seafood Hives     Diagnostic X-Ray Materials      Gadolinium      Family  "History  Family History   Problem Relation Age of Onset     Sickle Cell Trait Mother      Hypertension Mother      Asthma Mother      Sickle Cell Trait Father      Social History   Social History     Tobacco Use     Smoking status: Never Smoker     Smokeless tobacco: Never Used   Substance Use Topics     Alcohol use: Not Currently     Alcohol/week: 0.0 standard drinks     Drug use: Never      Past medical history, past surgical history, medications, allergies, family history, and social history were reviewed with the patient. No additional pertinent items.       Review of Systems   ROS: 14 point ROS neg other than the symptoms noted above in the HPI.      Physical Exam   BP: 136/76  Pulse: 118  Resp: 18  Height: 162.6 cm (5' 4\")  Weight: 76.2 kg (168 lb)  SpO2: 100 %  Physical Exam  Physical Exam   Constitutional: oriented to person, place, and time. appears well-developed and well-nourished.   HENT:   Head: Normocephalic and atraumatic.   Neck: Normal range of motion.   Pulmonary/Chest: Effort normal. No respiratory distress.   Cardiac: No murmurs, rubs, gallops. RRR.  Abdominal: Abdomen soft, nontender, nondistended. No rebound tenderness.  MSK: Long bones without deformity or evidence of trauma.  No swelling of the right arm compared to left.  Right wrist in chronic flexion.  No tenderness palpation throughout the right arm, compartments are soft.  No swelling, erythema to the skin.  Nontender palpation over the wrists, elbows or shoulders.  Neurological: alert and oriented to person, place, and time.   Skin: Skin is warm and dry.   Psychiatric:  normal mood and affect.  behavior is normal. Thought content normal.     ED Course      Procedures     1:44 AM  The patient was seen and examined by Andrew Chou MD in Room ED09.     Results for orders placed or performed during the hospital encounter of 06/11/21   US Upper Extremity Venous Duplex Right     Status: None (Preliminary result)    Impression    RESIDENT " PRELIMINARY INTERPRETATION  IMPRESSION:    1. No deep venous thrombosis in the right upper extremity.  2. Redemonstration of right neck cyst.   CBC with platelets differential     Status: None   Result Value Ref Range    WBC Canceled, Test credited 4.0 - 11.0 10e9/L    RBC Count Canceled, Test credited 3.8 - 5.2 10e12/L    Hemoglobin Canceled, Test credited 11.7 - 15.7 g/dL    Hematocrit Canceled, Test credited 35.0 - 47.0 %    MCV Canceled, Test credited 78 - 100 fl    MCH Canceled, Test credited 26.5 - 33.0 pg    MCHC Canceled, Test credited 31.5 - 36.5 g/dL    RDW Canceled, Test credited 10.0 - 15.0 %    Platelet Count Canceled, Test credited 150 - 450 10e9/L    Diff Method Canceled, Test credited    Comprehensive metabolic panel     Status: Abnormal   Result Value Ref Range    Sodium 136 133 - 144 mmol/L    Potassium 5.2 3.4 - 5.3 mmol/L    Chloride 110 (H) 94 - 109 mmol/L    Carbon Dioxide 23 20 - 32 mmol/L    Anion Gap 2 (L) 3 - 14 mmol/L    Glucose 90 70 - 99 mg/dL    Urea Nitrogen 8 7 - 30 mg/dL    Creatinine 0.55 0.52 - 1.04 mg/dL    GFR Estimate >90 >60 mL/min/[1.73_m2]    GFR Estimate If Black >90 >60 mL/min/[1.73_m2]    Calcium 8.8 8.5 - 10.1 mg/dL    Bilirubin Total 2.6 (H) 0.2 - 1.3 mg/dL    Albumin 3.9 3.4 - 5.0 g/dL    Protein Total 8.2 6.8 - 8.8 g/dL    Alkaline Phosphatase 82 40 - 150 U/L     (H) 0 - 50 U/L     (H) 0 - 45 U/L   HCG qualitative pregnancy (blood)     Status: None   Result Value Ref Range    HCG Qualitative Serum Negative NEG^Negative   UA reflex to Microscopic and Culture     Status: None    Specimen: Urine Midstream; Midstream Urine   Result Value Ref Range    Color Urine Yellow     Appearance Urine Clear     Glucose Urine Negative NEG^Negative mg/dL    Bilirubin Urine Negative NEG^Negative    Ketones Urine Negative NEG^Negative mg/dL    Specific Gravity Urine 1.009 1.003 - 1.035    Blood Urine Negative NEG^Negative    pH Urine 6.0 5.0 - 7.0 pH    Protein Albumin Urine  Negative NEG^Negative mg/dL    Urobilinogen mg/dL Normal 0.0 - 2.0 mg/dL    Nitrite Urine Negative NEG^Negative    Leukocyte Esterase Urine Negative NEG^Negative    Source Midstream Urine    CBC with platelets differential     Status: Abnormal   Result Value Ref Range    WBC 8.7 4.0 - 11.0 10e9/L    RBC Count 2.77 (L) 3.8 - 5.2 10e12/L    Hemoglobin 8.6 (L) 11.7 - 15.7 g/dL    Hematocrit 24.9 (L) 35.0 - 47.0 %    MCV 90 78 - 100 fl    MCH 31.0 26.5 - 33.0 pg    MCHC 34.5 31.5 - 36.5 g/dL    RDW 21.5 (H) 10.0 - 15.0 %    Platelet Count 441 150 - 450 10e9/L    Diff Method Manual Differential     % Neutrophils 46.0 %    % Lymphocytes 30.0 %    % Monocytes 6.0 %    % Eosinophils 15.0 %    % Basophils 3.0 %    Absolute Neutrophil 4.0 1.6 - 8.3 10e9/L    Absolute Lymphocytes 2.6 0.8 - 5.3 10e9/L    Absolute Monocytes 0.5 0.0 - 1.3 10e9/L    Absolute Eosinophils 1.3 (H) 0.0 - 0.7 10e9/L    Absolute Basophils 0.3 (H) 0.0 - 0.2 10e9/L    Poikilocytosis Marked     Polychromasia Moderate     Sickle Cells Marked     Platelet Estimate Confirming automated cell count           No results found for any visits on 06/11/21.  Medications   0.9% sodium chloride BOLUS (has no administration in time range)     Followed by   sodium chloride 0.9% infusion (has no administration in time range)        Assessments & Plan (with Medical Decision Making)   MDM  Patient presenting with sick cell pain crisis.  She has no chest pain, shortness of breath or fevers and is just acute chest.  Pain crisis is similar to prior.  She is concerned about a blood clot, ultrasound negative.  No signs of infection over the joints or skin of the right arm.  Compartments are soft.  No injuries to suggest fracture, will not x-ray.  She is given pain control per her plan, pain did improve quite a bit.  Tachycardia improved.  She typically has a heart rate around 100 205 which is where she ended up after treatment.  She is tolerating p.o. intake.  She will follow  up with oncology.    I have reviewed the nursing notes. I have reviewed the findings, diagnosis, plan and need for follow up with the patient.    New Prescriptions    No medications on file       Final diagnoses:   Sickle cell pain crisis (H)       --  I, Isaac Walton, am serving as a trained medical scribe to document services personally performed by Andrew Chou MD, based on the provider's statements to me.     I, Andrew Chou MD, was physically present and have reviewed and verified the accuracy of this note documented by Isaac Walton.    Andrew Chou MD  [facility] EMERGENCY DEPARTMENT  6/11/2021     Andrew Chou MD  06/11/21 0631

## 2021-06-12 ENCOUNTER — HOME INFUSION (PRE-WILLOW HOME INFUSION) (OUTPATIENT)
Dept: PHARMACY | Facility: CLINIC | Age: 21
End: 2021-06-12

## 2021-06-12 VITALS
RESPIRATION RATE: 18 BRPM | OXYGEN SATURATION: 94 % | SYSTOLIC BLOOD PRESSURE: 129 MMHG | DIASTOLIC BLOOD PRESSURE: 90 MMHG | TEMPERATURE: 99 F | HEART RATE: 107 BPM | WEIGHT: 168 LBS | BODY MASS INDEX: 28.84 KG/M2

## 2021-06-12 LAB
ALBUMIN SERPL-MCNC: 4.1 G/DL (ref 3.4–5)
ALP SERPL-CCNC: 90 U/L (ref 40–150)
ALT SERPL W P-5'-P-CCNC: 114 U/L (ref 0–50)
ANION GAP SERPL CALCULATED.3IONS-SCNC: 11 MMOL/L (ref 3–14)
AST SERPL W P-5'-P-CCNC: 98 U/L (ref 0–45)
BACTERIA SPEC CULT: NO GROWTH
BASOPHILS # BLD AUTO: 0.2 10E9/L (ref 0–0.2)
BASOPHILS NFR BLD AUTO: 2.9 %
BILIRUB SERPL-MCNC: 2.8 MG/DL (ref 0.2–1.3)
BUN SERPL-MCNC: 6 MG/DL (ref 7–30)
CALCIUM SERPL-MCNC: 9.2 MG/DL (ref 8.5–10.1)
CHLORIDE SERPL-SCNC: 106 MMOL/L (ref 94–109)
CO2 SERPL-SCNC: 21 MMOL/L (ref 20–32)
CREAT SERPL-MCNC: 0.57 MG/DL (ref 0.52–1.04)
DIFFERENTIAL METHOD BLD: ABNORMAL
EOSINOPHIL # BLD AUTO: 0.8 10E9/L (ref 0–0.7)
EOSINOPHIL NFR BLD AUTO: 10.1 %
ERYTHROCYTE [DISTWIDTH] IN BLOOD BY AUTOMATED COUNT: 21.2 % (ref 10–15)
GFR SERPL CREATININE-BSD FRML MDRD: >90 ML/MIN/{1.73_M2}
GLUCOSE SERPL-MCNC: 78 MG/DL (ref 70–99)
HCT VFR BLD AUTO: 26.2 % (ref 35–47)
HGB BLD-MCNC: 9 G/DL (ref 11.7–15.7)
IMM GRANULOCYTES # BLD: 0.1 10E9/L (ref 0–0.4)
IMM GRANULOCYTES NFR BLD: 0.7 %
LYMPHOCYTES # BLD AUTO: 2.7 10E9/L (ref 0.8–5.3)
LYMPHOCYTES NFR BLD AUTO: 35.7 %
MCH RBC QN AUTO: 30.4 PG (ref 26.5–33)
MCHC RBC AUTO-ENTMCNC: 34.4 G/DL (ref 31.5–36.5)
MCV RBC AUTO: 89 FL (ref 78–100)
MONOCYTES # BLD AUTO: 0.7 10E9/L (ref 0–1.3)
MONOCYTES NFR BLD AUTO: 9.2 %
NEUTROPHILS # BLD AUTO: 3.1 10E9/L (ref 1.6–8.3)
NEUTROPHILS NFR BLD AUTO: 41.4 %
NRBC # BLD AUTO: 0.3 10*3/UL
NRBC BLD AUTO-RTO: 4 /100
PLATELET # BLD AUTO: 423 10E9/L (ref 150–450)
POTASSIUM SERPL-SCNC: 3.9 MMOL/L (ref 3.4–5.3)
PROT SERPL-MCNC: 8.6 G/DL (ref 6.8–8.8)
RBC # BLD AUTO: 2.96 10E12/L (ref 3.8–5.2)
RETICS # AUTO: 658.9 10E9/L (ref 25–95)
RETICS/RBC NFR AUTO: 22.3 % (ref 0.5–2)
SODIUM SERPL-SCNC: 138 MMOL/L (ref 133–144)
SPECIMEN SOURCE: NORMAL
TROPONIN I SERPL-MCNC: <0.015 UG/L (ref 0–0.04)
WBC # BLD AUTO: 7.5 10E9/L (ref 4–11)

## 2021-06-12 PROCEDURE — 80053 COMPREHEN METABOLIC PANEL: CPT | Performed by: EMERGENCY MEDICINE

## 2021-06-12 PROCEDURE — 250N000011 HC RX IP 250 OP 636: Performed by: EMERGENCY MEDICINE

## 2021-06-12 PROCEDURE — 96376 TX/PRO/DX INJ SAME DRUG ADON: CPT

## 2021-06-12 PROCEDURE — 250N000013 HC RX MED GY IP 250 OP 250 PS 637: Performed by: EMERGENCY MEDICINE

## 2021-06-12 PROCEDURE — 85045 AUTOMATED RETICULOCYTE COUNT: CPT | Performed by: EMERGENCY MEDICINE

## 2021-06-12 PROCEDURE — 96374 THER/PROPH/DIAG INJ IV PUSH: CPT | Mod: XE

## 2021-06-12 PROCEDURE — 84484 ASSAY OF TROPONIN QUANT: CPT | Performed by: EMERGENCY MEDICINE

## 2021-06-12 PROCEDURE — 85025 COMPLETE CBC W/AUTO DIFF WBC: CPT | Performed by: EMERGENCY MEDICINE

## 2021-06-12 PROCEDURE — 999N000127 HC STATISTIC PERIPHERAL IV START W US GUIDANCE

## 2021-06-12 RX ORDER — DIPHENHYDRAMINE HCL 25 MG
25 CAPSULE ORAL ONCE
Status: COMPLETED | OUTPATIENT
Start: 2021-06-12 | End: 2021-06-12

## 2021-06-12 RX ORDER — MORPHINE SULFATE 2 MG/ML
2 INJECTION, SOLUTION INTRAMUSCULAR; INTRAVENOUS
Status: COMPLETED | OUTPATIENT
Start: 2021-06-12 | End: 2021-06-12

## 2021-06-12 RX ADMIN — MORPHINE SULFATE 2 MG: 2 INJECTION, SOLUTION INTRAMUSCULAR; INTRAVENOUS at 01:41

## 2021-06-12 RX ADMIN — DIPHENHYDRAMINE HYDROCHLORIDE 25 MG: 25 CAPSULE ORAL at 00:40

## 2021-06-12 RX ADMIN — MORPHINE SULFATE 2 MG: 2 INJECTION, SOLUTION INTRAMUSCULAR; INTRAVENOUS at 02:46

## 2021-06-12 RX ADMIN — MORPHINE SULFATE 2 MG: 2 INJECTION, SOLUTION INTRAMUSCULAR; INTRAVENOUS at 03:48

## 2021-06-12 ASSESSMENT — ENCOUNTER SYMPTOMS
SORE THROAT: 0
COUGH: 0
ABDOMINAL PAIN: 0
SHORTNESS OF BREATH: 0
HEMATURIA: 0
HEADACHES: 0
BACK PAIN: 1
MYALGIAS: 1
VOMITING: 0
ARTHRALGIAS: 0
CHILLS: 0
NECK STIFFNESS: 0
NAUSEA: 0
FEVER: 0
COLOR CHANGE: 0
DIARRHEA: 1
CONFUSION: 0
EYE REDNESS: 0

## 2021-06-12 NOTE — ED PROVIDER NOTES
ED Provider Note  [facility]      History     Chief Complaint   Patient presents with     Chest Pain     Sickle Cell Pain Crisis     HPI  Jennifer Cervantes is a 21 year old female who is very well-known to the emergency department for multiple visits who presents to the ED today complaining of chest pain and right arm pain.  Patient has a history of sickle cell disease, chronic pain, prior CVA with RUE deficit, prior DVT currently on Xarelto.  She has very frequent pain flares.  She was seen here yesterday for this exact same presentation and was noted to have a reassuring work-up.  She was given her usual pain medication protocol and was discharged home.  Patient reports that at about noon today she began to experience right arm pain and swelling (same presentation as yesterday) as well as some chest pain.  She denies any shortness of breath, denies cough.  Denies fevers or chills.  Denies sore throat or nasal congestion.  No abdominal pain, nausea, vomiting, or diarrhea.  No urinary symptoms.  She has not had her Covid vaccine yet.  She states she is taking all of her medications.  She denies any falls or trauma.    Yesterday she did have a right upper extremity ultrasound which was unrevealing.    She states she is here because her mother insisted she come here today.    This is her ninth ED visit in the past 12 days.    Past Medical History:   Diagnosis Date     Anxiety      Bleeding disorder (H)      Blood clotting disorder (H)      Cerebral infarction (H) 2015     Cognitive developmental delay     low IQ. 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 (H)     right hand contractures     Iron overload due to repeated red blood cell transfusions      Migraines      Multiple subsegmental pulmonary emboli without acute cor pulmonale (H) 02/01/2021     Oppositional  defiant behavior      Superficial venous thrombosis of arm, right 03/25/2021     Uncomplicated asthma        Past Surgical History:   Procedure Laterality Date     AS INSERT TUNNELED CV 2 CATH W/O PORT/PUMP       C BREAST REDUCTION (INCLUDES LIPO) TIER 3 Bilateral 04/23/2019     CHOLECYSTECTOMY       INSERT PORT VASCULAR ACCESS Left 4/21/2021    Procedure: INSERTION, VASCULAR ACCESS PORT (NOT SURE ON SIDE UNTIL REMOVAL);  Surgeon: Rajan More MD;  Location: [facility] OR     IR CHEST PORT PLACEMENT > 5 YRS OF AGE  4/21/2021     IR CVC NON TUNNEL LINE REMOVAL  5/6/2021     IR CVC NON TUNNEL PLACEMENT  04/07/2020     IR CVC NON TUNNEL PLACEMENT  4/30/2021     IR PORT REMOVAL LEFT  4/21/2021     REMOVE PORT VASCULAR ACCESS Left 4/21/2021    Procedure: REMOVAL, VASCULAR ACCESS PORT LEFT;  Surgeon: Rajan More MD;  Location: [facility] OR     REPAIR TENDON ELBOW Right 10/02/2019    Procedure: Right Elbow Flexor Lengthening, Flexor Pronator Slide Of Wrist and Finger, Thumb Adductor Lengthening;  Surgeon: Anai Franco MD;  Location: [facility]     TONSILLECTOMY Bilateral 10/02/2019    Procedure: Bilateral Tonsillectomy;  Surgeon: Farhana Guy MD;  Location: [facility] OR       Family History   Problem Relation Age of Onset     Sickle Cell Trait Mother      Hypertension Mother      Asthma Mother      Sickle Cell Trait Father        Social History     Tobacco Use     Smoking status: Never Smoker     Smokeless tobacco: Never Used   Substance Use Topics     Alcohol use: Not Currently     Alcohol/week: 0.0 standard drinks         Past Medical History  Past Medical History:   Diagnosis Date     Anxiety      Bleeding disorder (H)      Blood clotting disorder (H)      Cerebral infarction (H) 2015     Cognitive developmental delay     low IQ. 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 (H)     right  hand contractures     Iron overload due to repeated red blood cell transfusions      Migraines      Multiple subsegmental pulmonary emboli without acute cor pulmonale (H) 02/01/2021     Oppositional defiant behavior      Superficial venous thrombosis of arm, right 03/25/2021     Uncomplicated asthma      Past Surgical History:   Procedure Laterality Date     AS INSERT TUNNELED CV 2 CATH W/O PORT/PUMP       C BREAST REDUCTION (INCLUDES LIPO) TIER 3 Bilateral 04/23/2019     CHOLECYSTECTOMY       INSERT PORT VASCULAR ACCESS Left 4/21/2021    Procedure: INSERTION, VASCULAR ACCESS PORT (NOT SURE ON SIDE UNTIL REMOVAL);  Surgeon: Rajan More MD;  Location: [facility] OR     IR CHEST PORT PLACEMENT > 5 YRS OF AGE  4/21/2021     IR CVC NON TUNNEL LINE REMOVAL  5/6/2021     IR CVC NON TUNNEL PLACEMENT  04/07/2020     IR CVC NON TUNNEL PLACEMENT  4/30/2021     IR PORT REMOVAL LEFT  4/21/2021     REMOVE PORT VASCULAR ACCESS Left 4/21/2021    Procedure: REMOVAL, VASCULAR ACCESS PORT LEFT;  Surgeon: Rajan More MD;  Location: [facility] OR     REPAIR TENDON ELBOW Right 10/02/2019    Procedure: Right Elbow Flexor Lengthening, Flexor Pronator Slide Of Wrist and Finger, Thumb Adductor Lengthening;  Surgeon: Anai Franco MD;  [facility]     TONSILLECTOMY Bilateral 10/02/2019    Procedure: Bilateral Tonsillectomy;  Surgeon: Farhana Guy MD;  [facility]     acetaminophen (TYLENOL) 325 MG tablet  albuterol (PROAIR HFA/PROVENTIL HFA/VENTOLIN HFA) 108 (90 Base) MCG/ACT inhaler  albuterol (PROVENTIL) (2.5 MG/3ML) 0.083% neb solution  ARIPiprazole (ABILIFY) 2 MG tablet  budesonide-formoterol (SYMBICORT) 160-4.5 MCG/ACT Inhaler  diclofenac (VOLTAREN) 1 % topical gel  diphenhydrAMINE (BENADRYL) 25 MG capsule  EPINEPHrine (ANY BX GENERIC EQUIV) 0.3 MG/0.3ML injection 2-pack  Hydroxyurea 1000 MG TABS  hydrOXYzine (ATARAX) 25 MG tablet  JADENU 360 MG tablet  lidocaine-prilocaine (EMLA) 2.5-2.5 % external  cream  medroxyPROGESTERone (DEPO-PROVERA) 150 MG/ML IM injection  naloxone (NARCAN) 4 MG/0.1ML nasal spray  ondansetron (ZOFRAN) 8 MG tablet  oxyCODONE (OXYCONTIN) 10 MG 12 hr tablet  oxyCODONE IR (ROXICODONE) 15 MG tablet  rivaroxaban ANTICOAGULANT (XARELTO ANTICOAGULANT) 20 MG TABS tablet  sertraline (ZOLOFT) 100 MG tablet      Allergies   Allergen Reactions     Contrast Dye      Hives and breathing issues     Fish-Derived Products Hives     Seafood Hives     Diagnostic X-Ray Materials      Gadolinium      Family History  Family History   Problem Relation Age of Onset     Sickle Cell Trait Mother      Hypertension Mother      Asthma Mother      Sickle Cell Trait Father      Social History   Social History     Tobacco Use     Smoking status: Never Smoker     Smokeless tobacco: Never Used   Substance Use Topics     Alcohol use: Not Currently     Alcohol/week: 0.0 standard drinks     Drug use: Never      Past medical history, past surgical history, medications, allergies, family history, and social history were reviewed with the patient. No additional pertinent items.       Review of Systems   Constitutional: Negative for chills and fever.   HENT: Negative for congestion and sore throat.    Eyes: Negative for redness.   Respiratory: Negative for cough and shortness of breath.    Cardiovascular: Negative for chest pain.   Gastrointestinal: Positive for diarrhea. Negative for abdominal pain, nausea and vomiting.   Genitourinary: Negative for hematuria and urgency.   Musculoskeletal: Positive for back pain and myalgias. Negative for arthralgias and neck stiffness.   Skin: Negative for color change.   Neurological: Negative for headaches.   Psychiatric/Behavioral: Negative for confusion.   All other systems reviewed and are negative.    A complete review of systems was performed with pertinent positives and negatives noted in the HPI, and all other systems negative.    Physical Exam   BP: 131/77  Pulse: 119  Temp: 99  F  (37.2  C)  Resp: 18  Weight: 76.2 kg (168 lb)  SpO2: 94 %  Physical Exam  Vitals signs and nursing note reviewed.   Constitutional:       General: She is not in acute distress.     Appearance: She is well-developed. She is not diaphoretic.      Comments: Adult female, lying in bed, watching a video on her phone. NAD   HENT:      Head: Normocephalic and atraumatic.   Eyes:      Conjunctiva/sclera: Conjunctivae normal.      Pupils: Pupils are equal, round, and reactive to light.   Cardiovascular:      Rate and Rhythm: Normal rate and regular rhythm.      Heart sounds: Normal heart sounds.   Pulmonary:      Effort: Pulmonary effort is normal. No respiratory distress.      Breath sounds: Normal breath sounds. No wheezing or rales.      Comments: Mild right sided chest wall TTP  Chest:      Chest wall: Tenderness present.   Abdominal:      General: Bowel sounds are normal. There is no distension.      Palpations: Abdomen is soft.      Tenderness: There is no abdominal tenderness.   Musculoskeletal:      Comments: RUE hand/wrist contractures (baseline from prior CVA)  TTP diffusely. No swelling, no erythema.   Skin:     General: Skin is warm and dry.   Neurological:      Mental Status: She is alert and oriented to person, place, and time.   Psychiatric:      Comments: Anxious.  Somewhat lower functioning         ED Course      Procedures        The medical record was reviewed and interpreted.  Current labs reviewed and interpreted.  Previous labs reviewed and interpreted.       EKG Interpretation:      Interpreted by Leslie Roper MD  Time reviewed:0000   Symptoms at time of EKG: chest pain   Rhythm: sinus tach   Rate: 107  Axis: NORMAL  Ectopy: none  Conduction: normal  ST Segments/ T Waves: No ST-T wave changes  Q Waves: none  Comparison to prior: Unchanged    Clinical Impression: sinus tach       Results for orders placed or performed during the hospital encounter of 06/11/21   CBC with platelets differential      Status: Abnormal   Result Value Ref Range    WBC 7.5 4.0 - 11.0 10e9/L    RBC Count 2.96 (L) 3.8 - 5.2 10e12/L    Hemoglobin 9.0 (L) 11.7 - 15.7 g/dL    Hematocrit 26.2 (L) 35.0 - 47.0 %    MCV 89 78 - 100 fl    MCH 30.4 26.5 - 33.0 pg    MCHC 34.4 31.5 - 36.5 g/dL    RDW 21.2 (H) 10.0 - 15.0 %    Platelet Count 423 150 - 450 10e9/L    Diff Method Automated Method     % Neutrophils 41.4 %    % Lymphocytes 35.7 %    % Monocytes 9.2 %    % Eosinophils 10.1 %    % Basophils 2.9 %    % Immature Granulocytes 0.7 %    Nucleated RBCs 4 (H) 0 /100    Absolute Neutrophil 3.1 1.6 - 8.3 10e9/L    Absolute Lymphocytes 2.7 0.8 - 5.3 10e9/L    Absolute Monocytes 0.7 0.0 - 1.3 10e9/L    Absolute Eosinophils 0.8 (H) 0.0 - 0.7 10e9/L    Absolute Basophils 0.2 0.0 - 0.2 10e9/L    Abs Immature Granulocytes 0.1 0 - 0.4 10e9/L    Absolute Nucleated RBC 0.3    Comprehensive metabolic panel     Status: Abnormal   Result Value Ref Range    Sodium 138 133 - 144 mmol/L    Potassium 3.9 3.4 - 5.3 mmol/L    Chloride 106 94 - 109 mmol/L    Carbon Dioxide 21 20 - 32 mmol/L    Anion Gap 11 3 - 14 mmol/L    Glucose 78 70 - 99 mg/dL    Urea Nitrogen 6 (L) 7 - 30 mg/dL    Creatinine 0.57 0.52 - 1.04 mg/dL    GFR Estimate >90 >60 mL/min/[1.73_m2]    GFR Estimate If Black >90 >60 mL/min/[1.73_m2]    Calcium 9.2 8.5 - 10.1 mg/dL    Bilirubin Total 2.8 (H) 0.2 - 1.3 mg/dL    Albumin 4.1 3.4 - 5.0 g/dL    Protein Total 8.6 6.8 - 8.8 g/dL    Alkaline Phosphatase 90 40 - 150 U/L     (H) 0 - 50 U/L    AST 98 (H) 0 - 45 U/L   Reticulocyte count     Status: Abnormal   Result Value Ref Range    % Retic 22.3 (H) 0.5 - 2.0 %    Absolute Retic 658.9 (H) 25 - 95 10e9/L   Troponin I     Status: None   Result Value Ref Range    Troponin I ES <0.015 0.000 - 0.045 ug/L   EKG 12 lead     Status: None (Preliminary result)   Result Value Ref Range    Interpretation ECG Click View Image link to view waveform and result      Medications   morphine (PF) injection 2  mg (2 mg Intravenous Given 6/12/21 0246)   diphenhydrAMINE (BENADRYL) capsule 25 mg (25 mg Oral Given 6/12/21 0040)        Assessments & Plan (with Medical Decision Making)   Patient presents to the emergency department today with her usual and typical complaints.  She is well-known to the ED for multiple visits.  This is very often how she presents.  She had a complete work-up yesterday for the same complaints.    We did establish IV access we did a blood for laboratory analysis.  CBC today shows a hemoglobin of 9 which is close to her baseline.  No leukocytosis.  CMP shows a bilirubin of 2.8 which is consistent with prior.  LFTs slightly above normal.  Reticulocyte count is 22.3% which is also fairly baseline for her.  Troponin is negative.  EKG shows sinus tachycardia without any ischemic change.    We have ordered her typical protocol for her which is 2 mg of morphine IV every 1 hour x3 doses.  I have given her oral Benadryl.    Upon reassessment she seems to be doing just fine, is currently watching a video on her phone.  I do not have concerned about acute chest syndrome or other acute complication at this time.    Patient will be signed out to the overnight physician to reassess after her third dose of pain meds.  If she wishes to come in the hospital this is reasonable, however typically she likes to be discharged.  I have strongly advised that she follow-up with her clinic as she has had 9 ED visits in the past 12 days.    I have reviewed the nursing notes. I have reviewed the findings, diagnosis, plan and need for follow up with the patient.    New Prescriptions    No medications on file       Final diagnoses:   Hb-SS disease with crisis (H)   Chronic pain syndrome       --  Leslie Roper MD  [facility] EMERGENCY DEPARTMENT  6/11/2021     Leslie Roper MD  06/12/21 3594

## 2021-06-12 NOTE — ED NOTES
Patient given discharge paperwork and information. Vitals were taken as charted. Patient ambulated to lobby safely and without incident to wait for ride.

## 2021-06-12 NOTE — TELEPHONE ENCOUNTER
Pt called stating she has Sickle cell crisis, and she wants to go to the emergency room a the [facility] and she would like to know th wait times. RN called ER and pt was told that the re are 7 people waiting in triage area, and could not tell how long the wait time is. Pt stated okay. Pt refused triage for her symptomes.       Hola Uriarte RN  [facility]

## 2021-06-12 NOTE — DISCHARGE INSTRUCTIONS
Please continue to take all your regular medications.    Return to the emergency department immediately for any chest pain, back pain, shortness of breath, weakness, abdominal pain nausea, vomiting, or any other concerns as given or discussed

## 2021-06-12 NOTE — ED TRIAGE NOTES
21yr old female hx of sickle cell disease presenting with chest pain. Pt reports chest pain started last night. States she was here last night with right arm swelling and now feel like arm swelling and pain is spreading into chest. Denies SOB. Denies fevers. Desferal infusing through [facility].

## 2021-06-13 ENCOUNTER — HOSPITAL ENCOUNTER (EMERGENCY)
Facility: CLINIC | Age: 21
Discharge: HOME | End: 2021-06-13
Attending: EMERGENCY MEDICINE | Admitting: EMERGENCY MEDICINE
Payer: COMMERCIAL

## 2021-06-13 VITALS
HEART RATE: 99 BPM | BODY MASS INDEX: 28.68 KG/M2 | HEIGHT: 64 IN | DIASTOLIC BLOOD PRESSURE: 78 MMHG | WEIGHT: 168 LBS | TEMPERATURE: 98.2 F | SYSTOLIC BLOOD PRESSURE: 118 MMHG | RESPIRATION RATE: 18 BRPM | OXYGEN SATURATION: 95 %

## 2021-06-13 DIAGNOSIS — D57.00 SICKLE CELL PAIN CRISIS (H): ICD-10-CM

## 2021-06-13 LAB
ALBUMIN SERPL-MCNC: 4 G/DL (ref 3.4–5)
ALP SERPL-CCNC: 88 U/L (ref 40–150)
ALT SERPL W P-5'-P-CCNC: 110 U/L (ref 0–50)
ANION GAP SERPL CALCULATED.3IONS-SCNC: 4 MMOL/L (ref 3–14)
AST SERPL W P-5'-P-CCNC: 90 U/L (ref 0–45)
BASOPHILS # BLD AUTO: 0.3 10E9/L (ref 0–0.2)
BASOPHILS NFR BLD AUTO: 2.1 %
BILIRUB SERPL-MCNC: 2.6 MG/DL (ref 0.2–1.3)
BUN SERPL-MCNC: 8 MG/DL (ref 7–30)
CALCIUM SERPL-MCNC: 9 MG/DL (ref 8.5–10.1)
CHLORIDE SERPL-SCNC: 108 MMOL/L (ref 94–109)
CO2 SERPL-SCNC: 23 MMOL/L (ref 20–32)
CREAT SERPL-MCNC: 0.62 MG/DL (ref 0.52–1.04)
DIFFERENTIAL METHOD BLD: ABNORMAL
EOSINOPHIL # BLD AUTO: 1 10E9/L (ref 0–0.7)
EOSINOPHIL NFR BLD AUTO: 7.5 %
ERYTHROCYTE [DISTWIDTH] IN BLOOD BY AUTOMATED COUNT: 21.7 % (ref 10–15)
GFR SERPL CREATININE-BSD FRML MDRD: >90 ML/MIN/{1.73_M2}
GLUCOSE SERPL-MCNC: 102 MG/DL (ref 70–99)
HCT VFR BLD AUTO: 25.6 % (ref 35–47)
HGB BLD-MCNC: 8.9 G/DL (ref 11.7–15.7)
IMM GRANULOCYTES # BLD: 0.1 10E9/L (ref 0–0.4)
IMM GRANULOCYTES NFR BLD: 0.6 %
INTERPRETATION ECG - MUSE: NORMAL
LYMPHOCYTES # BLD AUTO: 3.2 10E9/L (ref 0.8–5.3)
LYMPHOCYTES NFR BLD AUTO: 25.5 %
MCH RBC QN AUTO: 30.3 PG (ref 26.5–33)
MCHC RBC AUTO-ENTMCNC: 34.8 G/DL (ref 31.5–36.5)
MCV RBC AUTO: 87 FL (ref 78–100)
MONOCYTES # BLD AUTO: 1.2 10E9/L (ref 0–1.3)
MONOCYTES NFR BLD AUTO: 9.5 %
NEUTROPHILS # BLD AUTO: 6.9 10E9/L (ref 1.6–8.3)
NEUTROPHILS NFR BLD AUTO: 54.8 %
NRBC # BLD AUTO: 0.3 10*3/UL
NRBC BLD AUTO-RTO: 3 /100
PLATELET # BLD AUTO: 409 10E9/L (ref 150–450)
POTASSIUM SERPL-SCNC: 4 MMOL/L (ref 3.4–5.3)
PROT SERPL-MCNC: 8.4 G/DL (ref 6.8–8.8)
RBC # BLD AUTO: 2.94 10E12/L (ref 3.8–5.2)
RETICS # AUTO: 738.8 10E9/L (ref 25–95)
RETICS/RBC NFR AUTO: 25.1 % (ref 0.5–2)
SODIUM SERPL-SCNC: 136 MMOL/L (ref 133–144)
WBC # BLD AUTO: 12.7 10E9/L (ref 4–11)

## 2021-06-13 PROCEDURE — 96361 HYDRATE IV INFUSION ADD-ON: CPT | Performed by: EMERGENCY MEDICINE

## 2021-06-13 PROCEDURE — 250N000011 HC RX IP 250 OP 636: Performed by: EMERGENCY MEDICINE

## 2021-06-13 PROCEDURE — 99285 EMERGENCY DEPT VISIT HI MDM: CPT | Performed by: EMERGENCY MEDICINE

## 2021-06-13 PROCEDURE — 85045 AUTOMATED RETICULOCYTE COUNT: CPT | Performed by: EMERGENCY MEDICINE

## 2021-06-13 PROCEDURE — 96376 TX/PRO/DX INJ SAME DRUG ADON: CPT | Performed by: EMERGENCY MEDICINE

## 2021-06-13 PROCEDURE — 80053 COMPREHEN METABOLIC PANEL: CPT | Performed by: EMERGENCY MEDICINE

## 2021-06-13 PROCEDURE — 96375 TX/PRO/DX INJ NEW DRUG ADDON: CPT | Performed by: EMERGENCY MEDICINE

## 2021-06-13 PROCEDURE — 85025 COMPLETE CBC W/AUTO DIFF WBC: CPT | Performed by: EMERGENCY MEDICINE

## 2021-06-13 PROCEDURE — 258N000003 HC RX IP 258 OP 636: Performed by: EMERGENCY MEDICINE

## 2021-06-13 PROCEDURE — 99285 EMERGENCY DEPT VISIT HI MDM: CPT | Mod: 25 | Performed by: EMERGENCY MEDICINE

## 2021-06-13 PROCEDURE — 96374 THER/PROPH/DIAG INJ IV PUSH: CPT | Performed by: EMERGENCY MEDICINE

## 2021-06-13 PROCEDURE — 250N000013 HC RX MED GY IP 250 OP 250 PS 637: Performed by: EMERGENCY MEDICINE

## 2021-06-13 RX ORDER — MORPHINE SULFATE 2 MG/ML
2 INJECTION, SOLUTION INTRAMUSCULAR; INTRAVENOUS
Status: DISCONTINUED | OUTPATIENT
Start: 2021-06-13 | End: 2021-06-13 | Stop reason: HOSPADM

## 2021-06-13 RX ORDER — MORPHINE SULFATE 4 MG/ML
2 INJECTION, SOLUTION INTRAMUSCULAR; INTRAVENOUS ONCE
Status: COMPLETED | OUTPATIENT
Start: 2021-06-13 | End: 2021-06-13

## 2021-06-13 RX ORDER — KETOROLAC TROMETHAMINE 30 MG/ML
30 INJECTION, SOLUTION INTRAMUSCULAR; INTRAVENOUS ONCE
Status: COMPLETED | OUTPATIENT
Start: 2021-06-13 | End: 2021-06-13

## 2021-06-13 RX ORDER — DIPHENHYDRAMINE HCL 25 MG
25 CAPSULE ORAL ONCE
Status: COMPLETED | OUTPATIENT
Start: 2021-06-13 | End: 2021-06-13

## 2021-06-13 RX ORDER — HEPARIN SODIUM (PORCINE) LOCK FLUSH IV SOLN 100 UNIT/ML 100 UNIT/ML
5 SOLUTION INTRAVENOUS
Status: COMPLETED | OUTPATIENT
Start: 2021-06-13 | End: 2021-06-13

## 2021-06-13 RX ORDER — ONDANSETRON 2 MG/ML
4 INJECTION INTRAMUSCULAR; INTRAVENOUS ONCE
Status: COMPLETED | OUTPATIENT
Start: 2021-06-13 | End: 2021-06-13

## 2021-06-13 RX ADMIN — KETOROLAC TROMETHAMINE 30 MG: 30 INJECTION, SOLUTION INTRAMUSCULAR; INTRAVENOUS at 04:50

## 2021-06-13 RX ADMIN — DIPHENHYDRAMINE HYDROCHLORIDE 25 MG: 25 CAPSULE ORAL at 04:51

## 2021-06-13 RX ADMIN — MORPHINE SULFATE 2 MG: 2 INJECTION, SOLUTION INTRAMUSCULAR; INTRAVENOUS at 09:16

## 2021-06-13 RX ADMIN — MORPHINE SULFATE 2 MG: 2 INJECTION, SOLUTION INTRAMUSCULAR; INTRAVENOUS at 04:55

## 2021-06-13 RX ADMIN — MORPHINE SULFATE 2 MG: 4 INJECTION INTRAVENOUS at 10:10

## 2021-06-13 RX ADMIN — Medication 5 ML: at 10:43

## 2021-06-13 RX ADMIN — ONDANSETRON 4 MG: 2 INJECTION INTRAMUSCULAR; INTRAVENOUS at 04:51

## 2021-06-13 RX ADMIN — SODIUM CHLORIDE, POTASSIUM CHLORIDE, SODIUM LACTATE AND CALCIUM CHLORIDE 1000 ML: 600; 310; 30; 20 INJECTION, SOLUTION INTRAVENOUS at 04:57

## 2021-06-13 ASSESSMENT — MIFFLIN-ST. JEOR: SCORE: 1507.04

## 2021-06-13 NOTE — ED NOTES
Sign out note: Jennifer Cervantes is a 21 year old female was signed out to me by Dr. Diaz at 0800am .  Please see initial dictation for full details and history.  Patient has sickle cell disease and presents with pain in her shoulders and upper back..  She does have an emergency department care plan, which Dr. Diaz has followed. Plan at time of sign out is to treat the patient with her 2 remaining doses of morphine IV and then reassess and likely discharge her..         Course in the ED:  The patient received her 3 doses of morphine IV as per her care plan.  In addition she received Toradol, Zofran, Benadryl, and normal saline bolus.  She was feeling quite a bit better on reassessment by 10 AM.  She states she was just waiting for her third dose of IV morphine and then she feels able to be discharged home.      The patient is a 21-year-old female with sickle cell disease who presents with sickle cell pain.  She is feeling better after IV medications.  I note that she has  25% reticulocytes and will need close follow-up.  She was discharged with instructions to follow-up with her hematologist in 1 to 2 days.      This note was created in part by the use of Dragon voice recognition dictation system. Inadvertent grammatical errors and typographical errors may still exist.  MD Ira Vences Alda L, MD  06/13/21 1299

## 2021-06-13 NOTE — ED PROVIDER NOTES
ED Provider Note  [facility]      History     Chief Complaint   Patient presents with     Sickle Cell Pain Crisis     generalized pain     HPI  Jennifer Cervantes is a 21 year old female history of sickle cell disease presents to emergency department with complaint of pain in her shoulders and upper back.  She says this is typical for her sickle cell pain.  Feels identical.  She has been to the ED 9 times in the last 12 days for similar symptoms.  She states she has been trying to set up outpatient infusion, but has been having logistical difficulties.  Pain not improved with her at home pain medicines.  Has been constant since onset.  Has been worsening over the past 24 hours.  No history of trauma.  No numbness/tingling.  Denies chest pain, shortness of breath, fevers, abdominal pain, nausea/vomiting, diaphoresis.      Past Medical History  Past Medical History:   Diagnosis Date     Anxiety      Bleeding disorder (H)      Blood clotting disorder (H)      Cerebral infarction (H) 2015     Cognitive developmental delay     low IQ. 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 (H)     right hand contractures     Iron overload due to repeated red blood cell transfusions      Migraines      Multiple subsegmental pulmonary emboli without acute cor pulmonale (H) 02/01/2021     Oppositional defiant behavior      Superficial venous thrombosis of arm, right 03/25/2021     Uncomplicated asthma      Past Surgical History:   Procedure Laterality Date     AS INSERT TUNNELED CV 2 CATH W/O PORT/PUMP       C BREAST REDUCTION (INCLUDES LIPO) TIER 3 Bilateral 04/23/2019     CHOLECYSTECTOMY       INSERT PORT VASCULAR ACCESS Left 4/21/2021    Procedure: INSERTION, VASCULAR ACCESS PORT (NOT SURE ON SIDE UNTIL REMOVAL);  Surgeon: Rajan More MD;  [place] [facility] [place]     IR CHEST PORT  PLACEMENT > 5 YRS OF AGE  4/21/2021     IR CVC NON TUNNEL LINE REMOVAL  5/6/2021     IR CVC NON TUNNEL PLACEMENT  04/07/2020     IR CVC NON TUNNEL PLACEMENT  4/30/2021     IR PORT REMOVAL LEFT  4/21/2021     REMOVE PORT VASCULAR ACCESS Left 4/21/2021    Procedure: REMOVAL, VASCULAR ACCESS PORT LEFT;  Surgeon: Rajan More MD;  Location: [facility] OR     REPAIR TENDON ELBOW Right 10/02/2019    Procedure: Right Elbow Flexor Lengthening, Flexor Pronator Slide Of Wrist and Finger, Thumb Adductor Lengthening;  Surgeon: Anai Franco MD;  Location: [facility] OR     TONSILLECTOMY Bilateral 10/02/2019    Procedure: Bilateral Tonsillectomy;  Surgeon: Farhana Guy MD;  Location: [facility] OR     acetaminophen (TYLENOL) 325 MG tablet  albuterol (PROAIR HFA/PROVENTIL HFA/VENTOLIN HFA) 108 (90 Base) MCG/ACT inhaler  ARIPiprazole (ABILIFY) 2 MG tablet  budesonide-formoterol (SYMBICORT) 160-4.5 MCG/ACT Inhaler  diclofenac (VOLTAREN) 1 % topical gel  diphenhydrAMINE (BENADRYL) 25 MG capsule  Hydroxyurea 1000 MG TABS  hydrOXYzine (ATARAX) 25 MG tablet  JADENU 360 MG tablet  oxyCODONE (OXYCONTIN) 10 MG 12 hr tablet  oxyCODONE IR (ROXICODONE) 15 MG tablet  rivaroxaban ANTICOAGULANT (XARELTO ANTICOAGULANT) 20 MG TABS tablet  albuterol (PROVENTIL) (2.5 MG/3ML) 0.083% neb solution  EPINEPHrine (ANY BX GENERIC EQUIV) 0.3 MG/0.3ML injection 2-pack  lidocaine-prilocaine (EMLA) 2.5-2.5 % external cream  medroxyPROGESTERone (DEPO-PROVERA) 150 MG/ML IM injection  naloxone (NARCAN) 4 MG/0.1ML nasal spray  ondansetron (ZOFRAN) 8 MG tablet  sertraline (ZOLOFT) 100 MG tablet      Allergies   Allergen Reactions     Contrast Dye      Hives and breathing issues     Fish-Derived Products Hives     Seafood Hives     Diagnostic X-Ray Materials      Gadolinium      Family History  Family History   Problem Relation Age of Onset     Sickle Cell Trait Mother      Hypertension Mother      Asthma Mother      Sickle Cell Trait Father      Social History  "  Social History     Tobacco Use     Smoking status: Never Smoker     Smokeless tobacco: Never Used   Substance Use Topics     Alcohol use: Not Currently     Alcohol/week: 0.0 standard drinks     Drug use: Never      Past medical history, past surgical history, medications, allergies, family history, and social history were reviewed with the patient. No additional pertinent items.       Review of Systems   Constitutional: Negative for chills and fever.   HENT: Negative for voice change.    Eyes: Negative for visual disturbance.   Respiratory: Negative for shortness of breath.    Cardiovascular: Negative for chest pain.   Gastrointestinal: Negative for abdominal pain, nausea and vomiting.   Genitourinary: Negative for difficulty urinating.   Musculoskeletal: Positive for back pain. Negative for neck pain.        B/l shoulder pain     Neurological: Negative for headaches.   Psychiatric/Behavioral: The patient is not nervous/anxious.      A complete review of systems was performed with pertinent positives and negatives noted in the HPI, and all other systems negative.    Physical Exam   BP: 132/89  Pulse: 112  Temp: 98.2  F (36.8  C)  Resp: 18  Height: 162.6 cm (5' 4\")  Weight: 76.2 kg (168 lb)  SpO2: 95 %  Physical Exam  Vitals signs and nursing note reviewed.   Constitutional:       General: She is not in acute distress.     Appearance: Normal appearance. She is not ill-appearing or toxic-appearing.   HENT:      Head: Normocephalic and atraumatic.      Nose: Nose normal.      Mouth/Throat:      Mouth: Mucous membranes are moist.   Eyes:      Pupils: Pupils are equal, round, and reactive to light.   Neck:      Musculoskeletal: Normal range of motion. No neck rigidity.   Cardiovascular:      Rate and Rhythm: Normal rate.      Pulses: Normal pulses.      Heart sounds: Normal heart sounds.   Pulmonary:      Effort: Pulmonary effort is normal. No respiratory distress.      Breath sounds: Normal breath sounds.   Abdominal: "      General: Abdomen is flat. There is no distension.   Musculoskeletal: Normal range of motion.         General: No swelling or deformity.      Comments: Diffuse tenderness of the back without midline tenderness, deformities, skin changes.  Diffuse tenderness of the glenohumeral joints bilaterally.  Full range of motion.  No overlying skin changes, fluctuance, crepitus.   Skin:     General: Skin is warm.      Capillary Refill: Capillary refill takes less than 2 seconds.   Neurological:      Mental Status: She is alert and oriented to person, place, and time.   Psychiatric:         Mood and Affect: Mood normal.         ED Course          Results for orders placed or performed during the hospital encounter of 06/13/21   CBC with platelets differential     Status: Abnormal   Result Value Ref Range    WBC 12.7 (H) 4.0 - 11.0 10e9/L    RBC Count 2.94 (L) 3.8 - 5.2 10e12/L    Hemoglobin 8.9 (L) 11.7 - 15.7 g/dL    Hematocrit 25.6 (L) 35.0 - 47.0 %    MCV 87 78 - 100 fl    MCH 30.3 26.5 - 33.0 pg    MCHC 34.8 31.5 - 36.5 g/dL    RDW 21.7 (H) 10.0 - 15.0 %    Platelet Count 409 150 - 450 10e9/L    Diff Method Automated Method     % Neutrophils 54.8 %    % Lymphocytes 25.5 %    % Monocytes 9.5 %    % Eosinophils 7.5 %    % Basophils 2.1 %    % Immature Granulocytes 0.6 %    Nucleated RBCs 3 (H) 0 /100    Absolute Neutrophil 6.9 1.6 - 8.3 10e9/L    Absolute Lymphocytes 3.2 0.8 - 5.3 10e9/L    Absolute Monocytes 1.2 0.0 - 1.3 10e9/L    Absolute Eosinophils 1.0 (H) 0.0 - 0.7 10e9/L    Absolute Basophils 0.3 (H) 0.0 - 0.2 10e9/L    Abs Immature Granulocytes 0.1 0 - 0.4 10e9/L    Absolute Nucleated RBC 0.3    Comprehensive metabolic panel     Status: Abnormal   Result Value Ref Range    Sodium 136 133 - 144 mmol/L    Potassium 4.0 3.4 - 5.3 mmol/L    Chloride 108 94 - 109 mmol/L    Carbon Dioxide 23 20 - 32 mmol/L    Anion Gap 4 3 - 14 mmol/L    Glucose 102 (H) 70 - 99 mg/dL    Urea Nitrogen 8 7 - 30 mg/dL    Creatinine 0.62  0.52 - 1.04 mg/dL    GFR Estimate >90 >60 mL/min/[1.73_m2]    GFR Estimate If Black >90 >60 mL/min/[1.73_m2]    Calcium 9.0 8.5 - 10.1 mg/dL    Bilirubin Total 2.6 (H) 0.2 - 1.3 mg/dL    Albumin 4.0 3.4 - 5.0 g/dL    Protein Total 8.4 6.8 - 8.8 g/dL    Alkaline Phosphatase 88 40 - 150 U/L     (H) 0 - 50 U/L    AST 90 (H) 0 - 45 U/L   Reticulocyte count     Status: Abnormal   Result Value Ref Range    % Retic 25.1 (H) 0.5 - 2.0 %    Absolute Retic 738.8 (H) 25 - 95 10e9/L     Medications   ketorolac (TORADOL) injection 30 mg (30 mg Intravenous Given 6/13/21 0450)   diphenhydrAMINE (BENADRYL) capsule 25 mg (25 mg Oral Given 6/13/21 0451)   ondansetron (ZOFRAN) injection 4 mg (4 mg Intravenous Given 6/13/21 0451)   lactated ringers BOLUS 1,000 mL (0 mLs Intravenous Stopped 6/13/21 0754)   heparin 100 UNIT/ML injection 5 mL (5 mLs Intracatheter Given 6/13/21 1043)   morphine (PF) injection 2 mg (2 mg Intravenous Given 6/13/21 1010)        Assessments & Plan (with Medical Decision Making)   Patient presents for evaluation of shoulder pain and upper back pain which is consistent with her sickle cell disease.  No chest pain, hypoxia to suggest acute chest syndrome.  Laboratory work-up is within normal limits.  No signs of aplastic crisis.  Patient was given her usual cocktail of medications including morphine, Toradol, lactated Ringer's, Zofran.  On reassessment, patient feels improved, but not completely better.  Will sign out to morning provider for reassessment after medication regimen.  Patient is hopeful for discharge.    I have reviewed the nursing notes. I have reviewed the findings, diagnosis, plan and need for follow up with the patient.    Discharge Medication List as of 6/13/2021 10:33 AM          Final diagnoses:   Sickle cell pain crisis (H)       --  Raul Diaz DO  [facility] EMERGENCY DEPARTMENT  6/13/2021     Raul Diaz DO  06/14/21 0606

## 2021-06-13 NOTE — DISCHARGE INSTRUCTIONS
Please make an appointment to follow up with Hematology Oncology Clinic (phone: [phone]) in 1-2 days.  You need close follow-up with your hematologist.  Return for worsening symptoms.

## 2021-06-14 ENCOUNTER — TELEPHONE (OUTPATIENT)
Dept: ONCOLOGY | Facility: CLINIC | Age: 21
End: 2021-06-14

## 2021-06-14 DIAGNOSIS — D57.00 SICKLE CELL PAIN CRISIS (H): ICD-10-CM

## 2021-06-14 RX ORDER — OXYCODONE HCL 10 MG/1
10 TABLET, FILM COATED, EXTENDED RELEASE ORAL EVERY 12 HOURS
Qty: 60 TABLET | Refills: 0 | Status: SHIPPED | OUTPATIENT
Start: 2021-06-14 | End: 2021-07-09

## 2021-06-14 RX ORDER — OXYCODONE HYDROCHLORIDE 15 MG/1
TABLET ORAL
Qty: 60 TABLET | Refills: 0 | Status: SHIPPED | OUTPATIENT
Start: 2021-06-14 | End: 2021-06-28

## 2021-06-14 ASSESSMENT — ENCOUNTER SYMPTOMS
NAUSEA: 0
FEVER: 0
VOICE CHANGE: 0
VOMITING: 0
NECK PAIN: 0
SHORTNESS OF BREATH: 0
BACK PAIN: 1
ABDOMINAL PAIN: 0
NERVOUS/ANXIOUS: 0
HEADACHES: 0
CHILLS: 0
DIFFICULTY URINATING: 0

## 2021-06-14 NOTE — TELEPHONE ENCOUNTER
Narcotic Refill Request    Medication(s) requested:  Oxycodone IR 15mg and Oxycontin 10mg.   Send to Which [facility]: [facility]  What pain is the medication treating: Sickle pain in lower back and sides.   How is the medication being taken?: Oxycodone is being taken 1 tablet every 6 hours. (about  4tablets per day)  Does pt have enough for today? Yes, just 2 to 3 tablets left for today.   Is pain being adequately controlled on the current regimen?: Pain in and out of hospital for past week.  Experiencing any side effects from medication?: denies    Date of most recent appointment:  6/4 with Dr. Duncan  Next appointment:   7/5 with Andrei HIGGINS  Last fill date and by whom:  By Andrei Patiño on 6/1   Reviewed: Not an agent      Routed/Paged provider: Andrei HIGGINS 10:17am.

## 2021-06-14 NOTE — TELEPHONE ENCOUNTER
[facility] Telephone Triage Note    The following symptoms were reported:   Typical  Description:            Onset:  Increased since this morning  Location: lower back and sides  Character: Stabbing           Intensity: 9/10    Accompanying Signs & Symptoms:  none    Chest Pain:  denies     Shortness of Breath:  denies     Fever:  denies     Chills:  denies   Cough/sore throat:  denies  Other:  Denies other issues.     Therapies Tried and outcome: oxycodone taken around 8:00am and oxycontin taken every 12hrs.    Improved by: minimal relief.     The following provider was consulted: Andrei HIGGINS 12:27pm. Approved for IVF/Pain per therapy plan either today. If patient calls back tomorrow Tuesday 6/15 okay for IVF/Pain as long as no other ER visits or changes in condition from today.     The following advice/orders were given, and/or interventions recommended:    Pending appt availability    Patient instructions and/or follow up:      2:04pm update Jennifer Cervantes, that no further appt availability. To try calling back when clinic opens tomorrow at 7:00am.     Instructed patient to seek care immediately for worsening symptoms, including: fever, chest pain, shortness of breath, dizziness.

## 2021-06-14 NOTE — TELEPHONE ENCOUNTER
PRIOR AUTHORIZATION DENIED    Medication: Jadenu DAW1 - [place] denied    Denial Date: 6/11/2021    Denial Rational: documentation has not shown an allergic reaction to the generic equivalent.  There is a current authorization for the generic in effect until 06/07/2022    Appeal Information:[phone]

## 2021-06-15 ENCOUNTER — PATIENT OUTREACH (OUTPATIENT)
Dept: CARE COORDINATION | Facility: CLINIC | Age: 21
End: 2021-06-15

## 2021-06-15 ENCOUNTER — NURSE TRIAGE (OUTPATIENT)
Dept: ONCOLOGY | Facility: CLINIC | Age: 21
End: 2021-06-15

## 2021-06-15 ENCOUNTER — INFUSION THERAPY VISIT (OUTPATIENT)
Dept: ONCOLOGY | Facility: CLINIC | Age: 21
End: 2021-06-15
Attending: PEDIATRICS
Payer: COMMERCIAL

## 2021-06-15 VITALS
DIASTOLIC BLOOD PRESSURE: 86 MMHG | RESPIRATION RATE: 18 BRPM | SYSTOLIC BLOOD PRESSURE: 127 MMHG | OXYGEN SATURATION: 94 % | TEMPERATURE: 98.1 F | HEART RATE: 104 BPM

## 2021-06-15 DIAGNOSIS — D57.00 SICKLE CELL PAIN CRISIS (H): Primary | ICD-10-CM

## 2021-06-15 PROCEDURE — 96376 TX/PRO/DX INJ SAME DRUG ADON: CPT

## 2021-06-15 PROCEDURE — 250N000011 HC RX IP 250 OP 636: Performed by: PEDIATRICS

## 2021-06-15 PROCEDURE — 258N000003 HC RX IP 258 OP 636: Performed by: PEDIATRICS

## 2021-06-15 PROCEDURE — 96374 THER/PROPH/DIAG INJ IV PUSH: CPT

## 2021-06-15 PROCEDURE — 96361 HYDRATE IV INFUSION ADD-ON: CPT

## 2021-06-15 RX ORDER — HEPARIN SODIUM,PORCINE 10 UNIT/ML
5 VIAL (ML) INTRAVENOUS
Status: CANCELLED | OUTPATIENT
Start: 2021-06-15

## 2021-06-15 RX ORDER — ONDANSETRON 8 MG/1
8 TABLET, FILM COATED ORAL
Status: CANCELLED
Start: 2021-06-15

## 2021-06-15 RX ORDER — HEPARIN SODIUM (PORCINE) LOCK FLUSH IV SOLN 100 UNIT/ML 100 UNIT/ML
5 SOLUTION INTRAVENOUS
Status: DISCONTINUED | OUTPATIENT
Start: 2021-06-15 | End: 2021-06-15 | Stop reason: HOSPADM

## 2021-06-15 RX ORDER — DIPHENHYDRAMINE HCL 25 MG
25 CAPSULE ORAL
Status: CANCELLED
Start: 2021-06-15

## 2021-06-15 RX ORDER — MORPHINE SULFATE 2 MG/ML
2 INJECTION, SOLUTION INTRAMUSCULAR; INTRAVENOUS
Status: COMPLETED | OUTPATIENT
Start: 2021-06-15 | End: 2021-06-15

## 2021-06-15 RX ORDER — HEPARIN SODIUM (PORCINE) LOCK FLUSH IV SOLN 100 UNIT/ML 100 UNIT/ML
5 SOLUTION INTRAVENOUS
Status: CANCELLED | OUTPATIENT
Start: 2021-06-15

## 2021-06-15 RX ORDER — MORPHINE SULFATE 2 MG/ML
2 INJECTION, SOLUTION INTRAMUSCULAR; INTRAVENOUS
Status: CANCELLED
Start: 2021-06-15

## 2021-06-15 RX ADMIN — MORPHINE SULFATE 2 MG: 2 INJECTION, SOLUTION INTRAMUSCULAR; INTRAVENOUS at 11:54

## 2021-06-15 RX ADMIN — MORPHINE SULFATE 2 MG: 2 INJECTION, SOLUTION INTRAMUSCULAR; INTRAVENOUS at 10:49

## 2021-06-15 RX ADMIN — DEXTROSE AND SODIUM CHLORIDE 500 ML: 5; 450 INJECTION, SOLUTION INTRAVENOUS at 10:47

## 2021-06-15 RX ADMIN — MORPHINE SULFATE 2 MG: 2 INJECTION, SOLUTION INTRAMUSCULAR; INTRAVENOUS at 12:54

## 2021-06-15 RX ADMIN — Medication 5 ML: at 13:12

## 2021-06-15 ASSESSMENT — PAIN SCALES - GENERAL: PAINLEVEL: EXTREME PAIN (8)

## 2021-06-15 NOTE — PROGRESS NOTES
Infusion Nursing Note:  Jennifer Cervantes presents today for IVF and pain medications.    Patient seen by provider today: No   present during visit today: Not Applicable.    Note:  Pt assessed upon arrival to infusion clinic.  Denies fever, chills, SOB, or cough.  Pt complaining of 8/10 pain in her lower back and legs; pt states this is her usual sickle cell pain.  Has been going to the ED when pt is unable to get an infusion appointment.    IVF and morphine x 3 given today; rating pain 5/10.  Pt states pain is controlled enough for discharge home. No other care needs needed today.    Intravenous Access:  Implanted Port.    Treatment Conditions:  Not Applicable.      Post Infusion Assessment:  Patient tolerated infusion without incident.  Blood return noted pre and post infusion.  Site patent and intact, free from redness, edema or discomfort.  No evidence of extravasations.  Access discontinued per protocol.       Discharge Plan:   Patient declined prescription refills.  AVS to patient via [employer].  Patient will return 6/18/21 for next appointment.   Patient discharged in stable condition accompanied by: self.  Departure Mode: Ambulatory.    Claudia Batista RN

## 2021-06-15 NOTE — PROGRESS NOTES
Social Work Follow-Up  [facility]    Data/Intervention:  Patient Name:  Jennifer Cervantes  /Age:  2000 (21 year old)    Reason for Follow-Up:  Transportation    Collaborated With:    -Elroy Roach RN  -[employer] [facility]  -Pt    Intervention/Education/Resources Provided:  SW received a phone call from Elroy Roach RN, asking for assistance in setting up ride for pt's appointment at 10:30 am today. SW called pt's insurance and set up round trip ride with a will call return ride with [employer]. Pt will need to call [employer] ([phone]) once they are finished with their appointment. SW called pt and informed them of their ride detail and they verbalized understanding. SW checked in with pt to see how they have been doing since they last spoke. Pt is still living at home and reported that it has been going well. Pt talked about needing to take time to be alone and de-stress and explained that staying at a hotel for a couple days allows her to do so. However, pt stated that things have been okay at home. SW encouraged pt to reach with any additional questions or concerns.    Assessment/Plan:  SW will remain available as needed.  Previously provided patient/family with writer's contact information and availability.       CARLOS Chavez,LGSW  Hematology/Oncology Social Worker  Phone:[phone] Pager: [phone]

## 2021-06-15 NOTE — TELEPHONE ENCOUNTER
Ange Zambranoara calls in requesting to be added to the IV infusion wait list for sickle cell pain. She was approved yesterday afternoon but was unable to get in due to capacity. She will be added to the wait list again for today.    She reports the following pain:  Back pain, bilateral flank pain  Rated 8/10  Took Tylenol, oxycodone, oxycontin at 6am.    Denies chest pain, denies SOB, denies fevers, denies chills.

## 2021-06-15 NOTE — PATIENT INSTRUCTIONS
[facility] Triage and after hours / weekends / holidays:  [phone]    Please call the triage or after hours line if you experience a temperature greater than or equal to 100.5, shaking chills, have uncontrolled nausea, vomiting and/or diarrhea, dizziness, shortness of breath, chest pain, bleeding, unexplained bruising, or if you have any other new/concerning symptoms, questions or concerns.      If you are having any concerning symptoms or wish to speak to a provider before your next infusion visit, please call your care coordinator or triage to notify them so we can adequately serve you.     If you need a refill on a narcotic prescription or other medication, please call before your infusion appointment.                 June 2021 Sunday Monday Tuesday Wednesday Thursday Friday Saturday             1    NEURO EVAL   7:15 AM   (60 min.)   Deborah Clark PT   [facility] [place]    Admission  12:08 PM   Jitendra Brandon DO   [facility]   (Discharge: 6/1/2021) 2    Admission   4:03 PM   [facility]   (Discharge: 6/2/2021) 3    Admission   4:18 PM   [facility]   (Discharge: 6/3/2021) 4    Admission  12:51 AM   Raul Diaz DO   [facility]   (Discharge: 6/4/2021)    CT ABDOMEN PELVIS WO   4:30 AM   (20 min.)   [id number]   [facility]    LAB CENTRAL  12:15 PM   (15 min.)    [facility] DRAW   [facility]    RETURN  12:45 PM   (30 min.)   Eric Duncan MD   [facility] 5       6    Admission   2:11 AM   Court Ring MD   [facility]   (Discharge: 6/6/2021)    XR CHEST 2 VIEWS   4:35 AM   (15 min.)   [id number]   [facility] 7     8    Admission   1:16 AM   Raul Diaz DO   [facility]  Department of Anesthesiology  Postprocedure Note    Patient: Dagmar Alejandre  MRN: [id number]  YOB: 1959  Date of evaluation: 5/28/2019    Procedure Summary     Date:  05/28/19 Room / Location:  [facility] OR    Anesthesia Start:  1100 Anesthesia Stop:      Procedure:  LEFT TOTAL KNEE MAKOPLASTY WITH ADDUCTOR CANAL BLOCK FOR PAIN CONTROL      ENRIQUE LEE (Left Knee) Diagnosis:       Primary osteoarthritis of left knee      (LEFT KNEE OSTEOARTHRITIS)    Surgeon:  Liane Dominguez MD Responsible Provider:  Gwen Alcaraz MD    Anesthesia Type:  general ASA Status:  3        Anesthesia Type: general    Carli Phase I: Carli Score: 8    Carli Phase II:      Last vitals: Reviewed and per EMR flowsheets.      Anesthesia Post Evaluation   Anesthetic Problems: no   Last Vitals:     BP: 127/69 Pulse: 64   Resp: 16 SpO2: 96   Temp: 97.3 °F (36.3 °C)     Cardiovascular System Stable: yes  Respiratory Function: Airway Patent yes  ETT no  Ventilator no  Level of consciousness: awake, alert and oriented  Post-op pain: adequate analgesia  Hydration Adequate: yes  Nausea/Vomiting:no  Other Issues:     Duarte Aceves MD Emergency Department   (Discharge: 6/8/2021) 9    Admission  11:32 PM   Reta Miramontes MD   [facility]   (Discharge: 6/10/2021) 10     11    Admission   1:29 AM   [facility]   (Discharge: 6/11/2021)    US VENOUS   2:25 AM   (30 min.)   [id number]   [facility]    Admission  11:29 PM   [facility]   (Discharge: 6/12/2021) 12       13    Admission   4:10 AM   [facility]   (Discharge: 6/13/2021) 14     15    ONC INFUSION 2 HR (120 MIN)  10:30 AM   (120 min.)   [facility] ON[facility] INFUSION NURSE   [facility] 16     17     18    ONC INFUSION 3 HR (180 MIN)  11:00 AM   (180 min.)   [facility] ONC INFUSION NURSE   [facility]    RETURN   1:15 PM   (30 min.)   Eric Duncan MD   [facility] 19       20     21     22     23    NEW  10:15 AM   (60 min.)   Katherine Pierce MD   [facility] 24     25     26       27     28     29     30                                July 2021 Sunday Monday Tuesday Wednesday Thursday Friday Saturday                       1     2    ONC INFUSION 3 HR (180 MIN)   8:00 AM   (180 min.)   [facility] ONC INFUSION NURSE   [facility] 3       4     5    LAB CENTRAL   1:00 PM   (15 min.)   [facility]   [facility]    RETURN   1:15 PM   (45 min.)   Andrei Machado PA   [facility] 6     7     8     9     10       11     12     13     14     15     16    ONC INFUSION 3 HR (180 MIN)   8:00 AM   (180 min.)   [facility] ONC INFUSION NURSE   [facility] 17       18     19     20     21     22     23     24       25     26     27     28     29     30    LAB CENTRAL  12:30 PM   (15 min.)   [facility]   [facility]    RETURN  12:45 PM   (30 min.)   Eric Duncan MD   [facility] 31                  No results found for this or any previous visit (from the past 24 hour(s)).

## 2021-06-15 NOTE — TELEPHONE ENCOUNTER
Patient accepted for a 1030 am add on infusion for IVF and pain meds.     Patient notified and social work notified to set up transport.

## 2021-06-16 NOTE — PROGRESS NOTES
This is a recent snapshot of the patient's [facility] medical record.  For current drug dose and complete information and questions, call [phone] or In Basket pool, [facility] home infusion ([id number])  CSN Number:  [id number]

## 2021-06-17 ENCOUNTER — TELEPHONE (OUTPATIENT)
Dept: ONCOLOGY | Facility: CLINIC | Age: 21
End: 2021-06-17

## 2021-06-17 NOTE — TELEPHONE ENCOUNTER
Call placed to Jennifer to let her know that she would not be able to get in for an infusion today. If pain is unmanageable please go to ER or can call again tomorrow morning at 7:00 am to get on infusion list.

## 2021-06-17 NOTE — TELEPHONE ENCOUNTER
Pt called in to triage requesting IVF pain meds for lower back and side pain rated 9 out of 10 x 2 days. Stated last took prn oxycodone 15 mg, Oxycontin 10 mg every 12 hours, and Tylenol 2 tabs at 7:00 am  this morning without relief. Denied any fevers, chills, cough, sob, chest pain or other symptoms. Pt's last infusion was 6/15, last clinic visit 6/4/21 with follow-up on 6/18 with Dr Duncan. Pt denied being out of home medications and needing any refills today. Pt does not qualify for sickle cell standing order protocol.    10:11 Dr Duncan paged    10:17 Dr Duncan returned call Ok for IVF and pain , states she has active treatment plan standing orders in place.    Pending appointment availability.

## 2021-06-18 ENCOUNTER — TELEPHONE (OUTPATIENT)
Dept: ONCOLOGY | Facility: CLINIC | Age: 21
End: 2021-06-18

## 2021-06-18 ENCOUNTER — ONCOLOGY VISIT (OUTPATIENT)
Dept: ONCOLOGY | Facility: CLINIC | Age: 21
End: 2021-06-18
Attending: PEDIATRICS
Payer: COMMERCIAL

## 2021-06-18 ENCOUNTER — APPOINTMENT (OUTPATIENT)
Dept: LAB | Facility: CLINIC | Age: 21
End: 2021-06-18
Attending: PEDIATRICS
Payer: COMMERCIAL

## 2021-06-18 ENCOUNTER — INFUSION THERAPY VISIT (OUTPATIENT)
Dept: ONCOLOGY | Facility: CLINIC | Age: 21
End: 2021-06-18
Attending: PHYSICIAN ASSISTANT
Payer: COMMERCIAL

## 2021-06-18 VITALS
RESPIRATION RATE: 16 BRPM | TEMPERATURE: 98.5 F | SYSTOLIC BLOOD PRESSURE: 123 MMHG | DIASTOLIC BLOOD PRESSURE: 74 MMHG | OXYGEN SATURATION: 93 % | BODY MASS INDEX: 28.63 KG/M2 | WEIGHT: 166.8 LBS | HEART RATE: 101 BPM

## 2021-06-18 VITALS
RESPIRATION RATE: 18 BRPM | DIASTOLIC BLOOD PRESSURE: 72 MMHG | TEMPERATURE: 98 F | HEART RATE: 96 BPM | SYSTOLIC BLOOD PRESSURE: 124 MMHG | OXYGEN SATURATION: 95 %

## 2021-06-18 DIAGNOSIS — D57.00 SICKLE CELL CRISIS (H): ICD-10-CM

## 2021-06-18 DIAGNOSIS — E83.111 IRON OVERLOAD DUE TO REPEATED RED BLOOD CELL TRANSFUSIONS: Primary | ICD-10-CM

## 2021-06-18 DIAGNOSIS — D57.00 SICKLE CELL PAIN CRISIS (H): ICD-10-CM

## 2021-06-18 DIAGNOSIS — D57.00 SICKLE CELL PAIN CRISIS (H): Primary | ICD-10-CM

## 2021-06-18 LAB
ANION GAP SERPL CALCULATED.3IONS-SCNC: 9 MMOL/L (ref 3–14)
ANISOCYTOSIS BLD QL SMEAR: ABNORMAL
BASOPHILS # BLD AUTO: 0.1 10E9/L (ref 0–0.2)
BASOPHILS NFR BLD AUTO: 1 %
BUN SERPL-MCNC: 8 MG/DL (ref 7–30)
CALCIUM SERPL-MCNC: 8.4 MG/DL (ref 8.5–10.1)
CHLORIDE SERPL-SCNC: 109 MMOL/L (ref 94–109)
CO2 SERPL-SCNC: 22 MMOL/L (ref 20–32)
CREAT SERPL-MCNC: 0.51 MG/DL (ref 0.52–1.04)
DIFFERENTIAL METHOD BLD: ABNORMAL
EOSINOPHIL # BLD AUTO: 0.9 10E9/L (ref 0–0.7)
EOSINOPHIL NFR BLD AUTO: 7 %
ERYTHROCYTE [DISTWIDTH] IN BLOOD BY AUTOMATED COUNT: 22.5 % (ref 10–15)
FERRITIN SERPL-MCNC: 8923 NG/ML (ref 12–150)
GFR SERPL CREATININE-BSD FRML MDRD: >90 ML/MIN/{1.73_M2}
GLUCOSE SERPL-MCNC: 97 MG/DL (ref 70–99)
HCT VFR BLD AUTO: 25.2 % (ref 35–47)
HGB BLD-MCNC: 8.9 G/DL (ref 11.7–15.7)
LYMPHOCYTES # BLD AUTO: 3.7 10E9/L (ref 0.8–5.3)
LYMPHOCYTES NFR BLD AUTO: 29 %
MCH RBC QN AUTO: 31 PG (ref 26.5–33)
MCHC RBC AUTO-ENTMCNC: 35.3 G/DL (ref 31.5–36.5)
MCV RBC AUTO: 88 FL (ref 78–100)
MONOCYTES # BLD AUTO: 0.6 10E9/L (ref 0–1.3)
MONOCYTES NFR BLD AUTO: 5 %
NEUTROPHILS # BLD AUTO: 7.3 10E9/L (ref 1.6–8.3)
NEUTROPHILS NFR BLD AUTO: 58 %
NRBC # BLD AUTO: 1 10*3/UL
NRBC BLD AUTO-RTO: 8 /100
PLATELET # BLD AUTO: 371 10E9/L (ref 150–450)
PLATELET # BLD EST: ABNORMAL 10*3/UL
POIKILOCYTOSIS BLD QL SMEAR: ABNORMAL
POLYCHROMASIA BLD QL SMEAR: SLIGHT
POTASSIUM SERPL-SCNC: 3.8 MMOL/L (ref 3.4–5.3)
RBC # BLD AUTO: 2.87 10E12/L (ref 3.8–5.2)
RETICS # AUTO: 710.2 10E9/L (ref 25–95)
RETICS/RBC NFR AUTO: 24.7 % (ref 0.5–2)
SICKLE CELLS BLD QL SMEAR: ABNORMAL
SODIUM SERPL-SCNC: 140 MMOL/L (ref 133–144)
TARGETS BLD QL SMEAR: SLIGHT
WBC # BLD AUTO: 12.6 10E9/L (ref 4–11)

## 2021-06-18 PROCEDURE — 85025 COMPLETE CBC W/AUTO DIFF WBC: CPT | Performed by: PEDIATRICS

## 2021-06-18 PROCEDURE — 96375 TX/PRO/DX INJ NEW DRUG ADDON: CPT

## 2021-06-18 PROCEDURE — 250N000011 HC RX IP 250 OP 636: Performed by: PEDIATRICS

## 2021-06-18 PROCEDURE — 85045 AUTOMATED RETICULOCYTE COUNT: CPT | Performed by: PEDIATRICS

## 2021-06-18 PROCEDURE — 82728 ASSAY OF FERRITIN: CPT | Performed by: PEDIATRICS

## 2021-06-18 PROCEDURE — 96361 HYDRATE IV INFUSION ADD-ON: CPT

## 2021-06-18 PROCEDURE — 96413 CHEMO IV INFUSION 1 HR: CPT

## 2021-06-18 PROCEDURE — 99214 OFFICE O/P EST MOD 30 MIN: CPT | Performed by: PEDIATRICS

## 2021-06-18 PROCEDURE — 258N000003 HC RX IP 258 OP 636: Performed by: PEDIATRICS

## 2021-06-18 PROCEDURE — 96376 TX/PRO/DX INJ SAME DRUG ADON: CPT

## 2021-06-18 PROCEDURE — 80048 BASIC METABOLIC PNL TOTAL CA: CPT | Performed by: PEDIATRICS

## 2021-06-18 RX ORDER — HEPARIN SODIUM (PORCINE) LOCK FLUSH IV SOLN 100 UNIT/ML 100 UNIT/ML
5 SOLUTION INTRAVENOUS
Status: CANCELLED | OUTPATIENT
Start: 2021-06-18

## 2021-06-18 RX ORDER — MORPHINE SULFATE 2 MG/ML
2 INJECTION, SOLUTION INTRAMUSCULAR; INTRAVENOUS
Status: CANCELLED
Start: 2021-06-18

## 2021-06-18 RX ORDER — HEPARIN SODIUM (PORCINE) LOCK FLUSH IV SOLN 100 UNIT/ML 100 UNIT/ML
5 SOLUTION INTRAVENOUS
Status: DISCONTINUED | OUTPATIENT
Start: 2021-06-18 | End: 2021-06-18 | Stop reason: HOSPADM

## 2021-06-18 RX ORDER — HEPARIN SODIUM,PORCINE 10 UNIT/ML
5 VIAL (ML) INTRAVENOUS
Status: CANCELLED | OUTPATIENT
Start: 2021-06-18

## 2021-06-18 RX ORDER — HEPARIN SODIUM (PORCINE) LOCK FLUSH IV SOLN 100 UNIT/ML 100 UNIT/ML
5 SOLUTION INTRAVENOUS
Status: COMPLETED | OUTPATIENT
Start: 2021-06-18 | End: 2021-06-18

## 2021-06-18 RX ORDER — MORPHINE SULFATE 2 MG/ML
2 INJECTION, SOLUTION INTRAMUSCULAR; INTRAVENOUS
Status: DISCONTINUED | OUTPATIENT
Start: 2021-06-18 | End: 2021-06-18 | Stop reason: HOSPADM

## 2021-06-18 RX ORDER — DIPHENHYDRAMINE HCL 25 MG
25 CAPSULE ORAL
Status: CANCELLED
Start: 2021-06-18

## 2021-06-18 RX ORDER — ONDANSETRON 8 MG/1
8 TABLET, FILM COATED ORAL
Status: CANCELLED
Start: 2021-06-18

## 2021-06-18 RX ADMIN — MORPHINE SULFATE 2 MG: 2 INJECTION, SOLUTION INTRAMUSCULAR; INTRAVENOUS at 12:47

## 2021-06-18 RX ADMIN — SODIUM CHLORIDE 378 MG: 9 INJECTION, SOLUTION INTRAVENOUS at 12:07

## 2021-06-18 RX ADMIN — SODIUM CHLORIDE 250 ML: 9 INJECTION, SOLUTION INTRAVENOUS at 11:47

## 2021-06-18 RX ADMIN — MORPHINE SULFATE 2 MG: 2 INJECTION, SOLUTION INTRAMUSCULAR; INTRAVENOUS at 11:47

## 2021-06-18 RX ADMIN — MORPHINE SULFATE 2 MG: 2 INJECTION, SOLUTION INTRAMUSCULAR; INTRAVENOUS at 13:48

## 2021-06-18 RX ADMIN — Medication 5 ML: at 11:15

## 2021-06-18 RX ADMIN — DEXTROSE AND SODIUM CHLORIDE 500 ML: 5; 450 INJECTION, SOLUTION INTRAVENOUS at 12:47

## 2021-06-18 RX ADMIN — Medication 5 ML: at 14:53

## 2021-06-18 ASSESSMENT — PAIN SCALES - GENERAL: PAINLEVEL: EXTREME PAIN (9)

## 2021-06-18 NOTE — PROGRESS NOTES
Infusion Nursing Note:  Jennifer Cervantes presents today for IVFs, IV pain meds, New Jr.    Patient seen by provider today: Yes: Dr. Duncan in infusion   present during visit today: Not Applicable.    Note: Patient presents to infusion with 9/10 everywhere discomfort. Patient offered heat/cold interventions along with IVFs and IV Morphine. Patient denies acute complaints or concerns needing to be addressed today. Education on IV Jr given throughout encounter with patient verbalizing understanding of mechanism of action, infusion schedule, and possible side effects (specifically, instructions given to notify staff if any new symptoms develop during infusion). Patient denies s/s of infection such as fever, sore throat, cough, chest pain, shortness of breath, or changes in taste/smell.    When IV Jr was complete, patient notified RN staff that at the start of IV Jr, increased discomfort noted. Specifically, patient states her low back hurt before the start of IV Jr, and during infusion she noticed her upper back started to hurt. Vss. Patient denies chest pain, itchiness, shortness of breath, tightness, nausea, and no hives visible. Per [employer], Backache is 15% side effect. Patient appears well without visible distress.     Dr. Duncan made aware of upper back pain development during IV Jr and current pain status when physician was present in infusion for follow-up visit.  No further interventions needed in infusion per Dr. Duncan. Per Dr. Duncan, IV Jr will be ordered to infuse over 60 minutes in the future for improved tolerance and patient will  pain medication downstairs to help with pain management. Patient aware to seek care via ER if painis not tolerable or new symptoms develop at home.    Intravenous Access:  Implanted Port.    Treatment Conditions:  Lab Results   Component Value Date    HGB 8.9 06/18/2021     Lab Results   Component Value Date    WBC 12.6 06/18/2021      Lab  Results   Component Value Date    ANEU 7.3 06/18/2021     Lab Results   Component Value Date     06/18/2021      Lab Results   Component Value Date     06/18/2021                   Lab Results   Component Value Date    POTASSIUM 3.8 06/18/2021           Lab Results   Component Value Date    MAG 1.7 02/21/2021            Lab Results   Component Value Date    CR 0.51 06/18/2021                   Lab Results   Component Value Date    MICAH 8.4 06/18/2021                Lab Results   Component Value Date    BILITOTAL 2.6 06/13/2021           Lab Results   Component Value Date    ALBUMIN 4.0 06/13/2021                    Lab Results   Component Value Date     06/13/2021           Lab Results   Component Value Date    AST 90 06/13/2021       Results reviewed, labs MET treatment parameters, ok to proceed with treatment.      Post Infusion Assessment:  Patient tolerated infusion ok. See note above about upper back pain development during IV Jr.   Blood return noted pre and post infusion.  Site patent and intact, free from redness, edema or discomfort.  No evidence of extravasations.  Access discontinued per protocol.       Discharge Plan:   Patient declined prescription refills.  Discharge instructions reviewed with: Patient.  Patient and/or family verbalized understanding of discharge instructions and all questions answered.  AVS to patient via [employer].  Patient will return 7/2 for next appointment.   Patient discharged in stable condition accompanied by: self.  Departure Mode: Ambulatory.  Face to Face time: 0 minutes.      Rik Ayers RN      ~~~ NOTE: If the patient answers yes to any of the questions below, hold the infusion and contact ordering provider or on-call provider.    1. Have you recently had an elevated temperature, fever, chills, productive cough, coughing for 3 weeks or longer or hemoptysis,  abnormal vital signs, night sweats,  chest pain or have you noticed a decrease in your  appetite, unexplained weight loss or fatigue? No  2. Do you have any open wounds or new incisions? No  3. Do you have any recent or upcoming hospitalizations, surgeries or dental procedures? No  4. Do you currently have or recently have had any signs of illness or infection or are you on any antibiotics? No  5. Have you had any new, sudden or worsening abdominal pain? No  6. Have you or anyone in your household received a live vaccination in the past 4 weeks? Please note:  No live vaccines while on biologic/chemotherapy until 6 months after the last treatment.  Patient can receive the flu vaccine (shot only) and the pneumovax.  It is optimal for the patient to get these vaccines mid cycle, but they can be given at any time as long as it is not on the day of the infusion. No  7. Have you recently been diagnosed with any new nervous system diseases (ie. Multiple sclerosis, Guillain [place], seizures, neurological changes) or cancer diagnosis? Are you on any form of radiation or chemotherapy? No  8. Are you pregnant or breast feeding or do you have plans of pregnancy in the future? No  9. Have you been having any signs of worsening depression or suicidal ideations?  (benlysta only) No  10. Have there been any other new onset medical symptoms? No

## 2021-06-18 NOTE — PATIENT INSTRUCTIONS
[facility] Triage and after hours / weekends / holidays:  [phone]    Please call the triage or after hours line if you experience a temperature greater than or equal to 100.5, shaking chills, have uncontrolled nausea, vomiting and/or diarrhea, dizziness, shortness of breath, chest pain, bleeding, unexplained bruising, or if you have any other new/concerning symptoms, questions or concerns.      If you are having any concerning symptoms or wish to speak to a provider before your next infusion visit, please call your care coordinator or triage to notify them so we can adequately serve you.     If you need a refill on a narcotic prescription or other medication, please call before your infusion appointment.                 June 2021 Sunday Monday Tuesday Wednesday Thursday Friday Saturday             1    NEURO EVAL   7:15 AM   (60 min.)   Deborah Clark PT   [facility]    Admission  12:08 PM   Jitendra Brandon DO   [facility]   (Discharge: 6/1/2021) 2    Admission   4:03 PM   [facility]   (Discharge: 6/2/2021) 3    Admission   4:18 PM   [facility]   (Discharge: 6/3/2021) 4    Admission  12:51 AM   Raul Diaz DO   [facility]   (Discharge: 6/4/2021)    CT ABDOMEN PELVIS WO   4:30 AM   (20 min.)   [id number]   [facility]    LAB CENTRAL  12:15 PM   (15 min.)    [facility]   [facility]    RETURN  12:45 PM   (30 min.)   Eric Duncan MD   [facility] 5       6    Admission   2:11 AM   Court Ring MD   [facility]   (Discharge: 6/6/2021)    XR CHEST 2 VIEWS   4:35 AM   (15 min.)   [email or web address]   [facility] 7     8    Admission   1:16 AM   Raul Diaz DO   [facility]   (Discharge: 6/8/2021) 9    Admission  11:32 PM   Reta Miramontes MD   [facility]   (Discharge: 6/10/2021) 10     11    Admission   1:29 AM   Andrew Chou MD   [facility]   (Discharge: 6/11/2021)    US VENOUS   2:25 AM   (30 min.)   [email or web address]   [facility]    Admission  11:29 PM   Leslie Roper MD   [facility]   (Discharge: 6/12/2021) 12       13    Admission   4:10 AM   Raul Diaz DO   [facility]   (Discharge: 6/13/2021) 14     15    ONC INFUSION 2 HR (120 MIN)  10:30 AM   (120 min.)   [facility]FUSION NURSE   [facility] 16     17     18    LAB CENTRAL  10:30 AM   (15 min.)   [facility]   [facility]    ONC INFUSION 3 HR (180 MIN)  11:00 AM   (180 min.)   [facility] ON[facility] INFUSION NURSE   [facility]    RETURN   1:15 PM   (30 min.)   Eric Duncan MD   [facility] 19       20     21     22     23    NEW  10:15 AM   (60 min.)   Katherine Pierce MD   [facility] 24     25     26       27     28     29     30 July 2021 Sunday Monday Tuesday Wednesday Thursday Friday Saturday                       1     2    LAB CENTRAL   7:30 AM   (15 min.)   [facility]   [facility]    ONC INFUSION 3 HR (180 MIN)   8:00 AM   (180 min.)   [facility] ONC INFUSION NURSE   [facility] 3       4     5    RETURN   1:15 PM   (45 min.)   Andrei Machado PA   [facility] 6     7     8     9     10       11     12     13     14     15     16    LAB CENTRAL   7:30 AM   (15 min.)   [facility]   [facility]    ONC INFUSION 3 HR (180 MIN)    8:00 AM   (180 min.)    ONC INFUSION NURSE   [facility] 17       18     19    MR ABDOMEN WO   4:30 PM   (60 min.)   [email or web address]   [facility] [place] 20     21     22     23     24       25     26    LAB CENTRAL  11:45 AM   (15 min.)    [facility] DRAW   [facility]    RETURN  12:15 PM   (30 min.)   Eric Duncan MD   [facility] 27     28     29     30     31                     Lab Results:  Recent Results (from the past 12 hour(s))   CBC with platelets differential    Collection Time: 06/18/21 11:20 AM   Result Value Ref Range    WBC 12.6 (H) 4.0 - 11.0 10e9/L    RBC Count 2.87 (L) 3.8 - 5.2 10e12/L    Hemoglobin 8.9 (L) 11.7 - 15.7 g/dL    Hematocrit 25.2 (L) 35.0 - 47.0 %    MCV 88 78 - 100 fl    MCH 31.0 26.5 - 33.0 pg    MCHC 35.3 31.5 - 36.5 g/dL    RDW 22.5 (H) 10.0 - 15.0 %    Platelet Count 371 150 - 450 10e9/L    Diff Method Manual Differential     % Neutrophils 58.0 %    % Lymphocytes 29.0 %    % Monocytes 5.0 %    % Eosinophils 7.0 %    % Basophils 1.0 %    Nucleated RBCs 8 (H) 0 /100    Absolute Neutrophil 7.3 1.6 - 8.3 10e9/L    Absolute Lymphocytes 3.7 0.8 - 5.3 10e9/L    Absolute Monocytes 0.6 0.0 - 1.3 10e9/L    Absolute Eosinophils 0.9 (H) 0.0 - 0.7 10e9/L    Absolute Basophils 0.1 0.0 - 0.2 10e9/L    Absolute Nucleated RBC 1.0     Anisocytosis Marked     Poikilocytosis Marked     Polychromasia Slight     Sickle Cells Marked     Target Cells Slight     Platelet Estimate Confirming automated cell count    Basic metabolic panel    Collection Time: 06/18/21 11:20 AM   Result Value Ref Range    Sodium 140 133 - 144 mmol/L    Potassium 3.8 3.4 - 5.3 mmol/L    Chloride 109 94 - 109 mmol/L    Carbon Dioxide 22 20 - 32 mmol/L    Anion Gap 9 3 - 14 mmol/L    Glucose 97 70 - 99 mg/dL    Urea Nitrogen 8 7 - 30 mg/dL    Creatinine 0.51 (L) 0.52 - 1.04 mg/dL    GFR Estimate >90 >60  mL/min/[1.73_m2]    GFR Estimate If Black >90 >60 mL/min/[1.73_m2]    Calcium 8.4 (L) 8.5 - 10.1 mg/dL   Reticulocyte count    Collection Time: 06/18/21 11:20 AM   Result Value Ref Range    % Retic 24.7 (H) 0.5 - 2.0 %    Absolute Retic 710.2 (H) 25 - 95 10e9/L

## 2021-06-18 NOTE — TELEPHONE ENCOUNTER
[facility] Telephone Triage Note    The following symptoms were reported:   Typical  Description:            Onset:  2 days  Location: back and thighs  Character: Sharp           Intensity: 9/10    Accompanying Signs & Symptoms:  none    Chest Pain:  denies     Shortness of Breath:  denies     Fever:  denies     Chills:  denies   Cough/sore throat:  denies  Other:      Therapies Tried and outcome: Last took oxycodone, oxycontin and tylenol taken around 6:00am.     15-20min will need transportation arranged.     Improved by: minimal relief    The following provider was consulted:  7:07am Dr. Duncan  2nd page 8:47am.     The following advice/orders were given, and/or interventions recommended:  Pending   8:48am Ok to add on IVF/Pain to Jr infusion today already scheduled for 11:00am. Dr. Duncan will see pt in clinic later.     Patient instructions and/or follow up:      Instructed patient to seek care immediately for worsening symptoms, including: fever, chest pain, shortness of breath, dizziness.

## 2021-06-18 NOTE — LETTER
6/18/2021         RE: Jennifer Cervantes  [address]  [facility] [address]        Dear Colleague,    Thank you for referring your patient, Jennifer Cervantes, to the [facility]. Please see a copy of my visit note below.    Sickle Cell Outpatient Follow Up Visit      Date of encounter: Jun 18, 2021    Jennifer Cervantes is a 21 year old female who is being seen in clinic for hospital follow up and health maintenance related to SCD      Interval History: Jennifer still has frequent pain. She has not been admitted however. She is here today for her first crizanlizumab (she had some pain with the infusion but was already coming in for pain med infusion as well). Pain is all in her typical locations. No new changes at home--she is still living with her family and she feels it's dysfunctional but she wants to save up money again. No issues with the Xarelto. She has not missed any doses. The chelation on the weekends is going well too.       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 She also has significant anxiety and depression with a strong anxiety about transferring to the adult clinic. She usually has pain crises secondary to the anxiety.      --------------------------------  Plan last reviewed with patient: 3/26/2021    Patient background: 20yo F, enjoys movies and kids though there are times where she does not really want to talk to people. Does not have a lot of social support at home.     Sickle Cell Disease History  Primary Hematologist: Perla  PCP: Raul  Genotype: SS  Acute Pain Crisis Treatment:    ER/Acute Care/Infusion Clinic:   o Morphine 2 mg  IVP/SC Q1H X 3 doses  o Toradol x1 (avoid through July due to being on anticoagulation)  o Maintenance IV fluids with LR  o Other: Benadryl PO and Zofran 8 mg IV PRN itching or nausea    Inpatient:  o Opioid: Morphine 2 mg IV Q1H PRN until PCA starts  o PCA plan:   - PCA button dose: Morphine 1 mg  - Lockout: 20 minutes  - Continuous Infusion: consider 1 mg/hr  - One hr max: 4 mg  o Other Medications: Benadryl, Zofran  o If PCA not working, she Has had success with ketamine starting at 4mg/h and advancing only to 6mg/h, as 8mg/h made her feel quite poorly.  o ASA on hold  o Supportive Care: Docusate, Senna  Chronic Pain Medications:    Home regimen Oxycontin 10 mg q12h, oxycodone 10 mg p.o. q.6 hours p.r.n. breakthrough pain.  She also continues on Celebrex, and Zoloft among other medications.    -Also benefits from mental health visits, acupuncture  Baseline Hemoglobin: 7 g/dl without chronic transfusions  Hydroxyurea use: Yes  H/O blood transfusions: Yes, several (iron overload) Most recent 8/21/20    H/O Transfusion Reactions: no    Antibodies:none  H/O Acute Chest Syndrome: Yes    Last episode: 10/2019     ICU/intubation: unsure  H/O Stroke: Yes (managed with chronic transfusions in the past)  H/O VTE: Yes (2/2021)  H/O Cholecystectomy or Splenectomy: no  H/O Asthma, Pulm HTN, AVN, Leg Ulcers, Nephropathy, Retinopathy, etc: Iron overload, asthma, chronic lung disease, mild developmental delay from early stroke    -------------------------------------------    Sickle Cell Disease Comprehensive Checklist    Bone Health/Avascular Necrosis Screening/Symptoms (each visit): no new concerns today    Leg Ulcer evaluation (every visit): none    Hypertension (every visit): mildly hypertensive recently but improved later in evening and previous day     Last urinalysis for microalbuminuria/CKD (annually): within last year    Last pulmonary evaluation (asthma, AMAN, pulm HTN): within last year    Stroke/silent cerebral  infarct Hx (Y/N): Yes TIA ~2014, first event ~age 1 with full stroke and R sided weakness    Last PCP Visit: 8/2020    Vaccines:  o PCV13: 5/13/19  o Pneumovax (PPSV23): 3/04, 10/09, 7/12/19 (next due 7/2024)  o Menactra: 4/2010, 9/2015 (MCV overdue Sept 2020)  o Influenza: got 20-21 vaccine per self report    Audiology (chelation): done 6/2020, normal    Past Medical History:  Past Medical History:   Diagnosis Date     Anxiety      Bleeding disorder (H)      Blood clotting disorder (H)      Cerebral infarction (H) 2015     Cognitive developmental delay     low IQ. 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 (H)     right hand contractures     Iron overload due to repeated red blood cell transfusions      Migraines      Multiple subsegmental pulmonary emboli without acute cor pulmonale (H) 02/01/2021     Oppositional defiant behavior      Superficial venous thrombosis of arm, right 03/25/2021     Uncomplicated asthma        Past Surgical History:  Past Surgical History:   Procedure Laterality Date     AS INSERT TUNNELED CV 2 CATH W/O PORT/PUMP       C BREAST REDUCTION (INCLUDES LIPO) TIER 3 Bilateral 04/23/2019     CHOLECYSTECTOMY       INSERT PORT VASCULAR ACCESS Left 4/21/2021    Procedure: INSERTION, VASCULAR ACCESS PORT (NOT SURE ON SIDE UNTIL REMOVAL);  Surgeon: Rajan More MD;  Location: [facility] OR     IR CHEST PORT PLACEMENT > 5 YRS OF AGE  4/21/2021     IR CVC NON TUNNEL LINE REMOVAL  5/6/2021     IR CVC NON TUNNEL PLACEMENT  04/07/2020     IR CVC NON TUNNEL PLACEMENT  4/30/2021     IR PORT REMOVAL LEFT  4/21/2021     REMOVE PORT VASCULAR ACCESS Left 4/21/2021    Procedure: REMOVAL, VASCULAR ACCESS PORT LEFT;  Surgeon: Rajan More MD;  Location: [facility] OR     REPAIR TENDON ELBOW Right 10/02/2019    Procedure: Right Elbow Flexor Lengthening, Flexor Pronator Slide Of Wrist and  Finger, Thumb Adductor Lengthening;  Surgeon: Anai Franco MD;  Location: [facility] OR     TONSILLECTOMY Bilateral 10/02/2019    Procedure: Bilateral Tonsillectomy;  Surgeon: Farhana Guy MD;  Location: [facility] OR       Family History:   Family History   Problem Relation Age of Onset     Sickle Cell Trait Mother      Hypertension Mother      Asthma Mother      Sickle Cell Trait Father        Social History:  Social History     Socioeconomic History     Marital status: Single     Spouse name: Not on file     Number of children: Not on file     Years of education: Not on file     Highest education level: Not on file   Occupational History     Not on file   Social Needs     Financial resource strain: Not on file     Food insecurity     Worry: Not on file     Inability: Not on file     Transportation needs     Medical: Not on file     Non-medical: Not on file   Tobacco Use     Smoking status: Never Smoker     Smokeless tobacco: Never Used   Substance and Sexual Activity     Alcohol use: Not Currently     Alcohol/week: 0.0 standard drinks     Drug use: Never     Sexual activity: Not Currently     Partners: Male     Birth control/protection: Other   Lifestyle     Physical activity     Days per week: Not on file     Minutes per session: Not on file     Stress: Not on file   Relationships     Social connections     Talks on phone: Not on file     Gets together: Not on file     Attends Sabianism service: Not on file     Active member of club or organization: Not on file     Attends meetings of clubs or organizations: Not on file     Relationship status: Not on file     Intimate partner violence     Fear of current or ex partner: Not on file     Emotionally abused: Not on file     Physically abused: Not on file     Forced sexual activity: Not on file   Other Topics Concern     Parent/sibling w/ CABG, MI or angioplasty before 65F 55M? Not Asked   Social History Narrative    Lives with mom and extended family but  "\"toxic environment\" per her report. She would like to move out but cannot financially do so. She has minimal support at home despite her significant SCD comorbidities and cognitive delay from stroke.       Medications:  Current Outpatient Medications   Medication     acetaminophen (TYLENOL) 325 MG tablet     albuterol (PROAIR HFA/PROVENTIL HFA/VENTOLIN HFA) 108 (90 Base) MCG/ACT inhaler     albuterol (PROVENTIL) (2.5 MG/3ML) 0.083% neb solution     ARIPiprazole (ABILIFY) 2 MG tablet     budesonide-formoterol (SYMBICORT) 160-4.5 MCG/ACT Inhaler     diclofenac (VOLTAREN) 1 % topical gel     diphenhydrAMINE (BENADRYL) 25 MG capsule     EPINEPHrine (ANY BX GENERIC EQUIV) 0.3 MG/0.3ML injection 2-pack     Hydroxyurea 1000 MG TABS     hydrOXYzine (ATARAX) 25 MG tablet     JADENU 360 MG tablet     lidocaine-prilocaine (EMLA) 2.5-2.5 % external cream     medroxyPROGESTERone (DEPO-PROVERA) 150 MG/ML IM injection     naloxone (NARCAN) 4 MG/0.1ML nasal spray     ondansetron (ZOFRAN) 8 MG tablet     oxyCODONE (OXYCONTIN) 10 MG 12 hr tablet     oxyCODONE IR (ROXICODONE) 15 MG tablet     rivaroxaban ANTICOAGULANT (XARELTO ANTICOAGULANT) 20 MG TABS tablet     sertraline (ZOLOFT) 100 MG tablet     Current Facility-Administered Medications   Medication     medroxyPROGESTERone (DEPO-PROVERA) syringe 150 mg         Physical Exam:   /74 (BP Location: Right arm, Patient Position: Sitting, Cuff Size: Adult Regular)   Pulse 101   Temp 98.5  F (36.9  C) (Tympanic)   Resp 16   Wt 75.7 kg (166 lb 12.8 oz)   SpO2 93%   BMI 28.63 kg/m       GEN: young  female, lying on hospital bed getting infusion  HEENT: no icterus, MMM, new hair color  CV: RRR, no murmurs  NECK: no visible asymmetry  RESP: speaking in full sentences, no increased work of breathing, clear to auscultation  SKIN: no bruising noted  MSK: contracture at right wrist (chronic), now with slight edema compared to previous visits but non painful to " touch  NEURO: alert and oriented      Labs:   Results for HIMANSHU AL (MRN [id number]) as of 6/21/2021 10:29   Ref. Range 6/18/2021 11:20   Sodium Latest Ref Range: 133 - 144 mmol/L 140   Potassium Latest Ref Range: 3.4 - 5.3 mmol/L 3.8   Chloride Latest Ref Range: 94 - 109 mmol/L 109   Carbon Dioxide Latest Ref Range: 20 - 32 mmol/L 22   Urea Nitrogen Latest Ref Range: 7 - 30 mg/dL 8   Creatinine Latest Ref Range: 0.52 - 1.04 mg/dL 0.51 (L)   GFR Estimate Latest Ref Range: >60 mL/min/1.73_m2 >90   GFR Estimate If Black Latest Ref Range: >60 mL/min/1.73_m2 >90   Calcium Latest Ref Range: 8.5 - 10.1 mg/dL 8.4 (L)   Anion Gap Latest Ref Range: 3 - 14 mmol/L 9   Ferritin Latest Ref Range: 12 - 150 ng/mL 8,923 (H)   Glucose Latest Ref Range: 70 - 99 mg/dL 97   WBC Latest Ref Range: 4.0 - 11.0 10e9/L 12.6 (H)   Hemoglobin Latest Ref Range: 11.7 - 15.7 g/dL 8.9 (L)   Hematocrit Latest Ref Range: 35.0 - 47.0 % 25.2 (L)   Platelet Count Latest Ref Range: 150 - 450 10e9/L 371   RBC Count Latest Ref Range: 3.8 - 5.2 10e12/L 2.87 (L)   MCV Latest Ref Range: 78 - 100 fl 88   MCH Latest Ref Range: 26.5 - 33.0 pg 31.0   MCHC Latest Ref Range: 31.5 - 36.5 g/dL 35.3   RDW Latest Ref Range: 10.0 - 15.0 % 22.5 (H)   Diff Method Unknown Manual Differential   % Neutrophils Latest Units: % 58.0   % Lymphocytes Latest Units: % 29.0   % Monocytes Latest Units: % 5.0   % Eosinophils Latest Units: % 7.0   % Basophils Latest Units: % 1.0   Nucleated RBCs Latest Ref Range: 0 /100 8 (H)   Absolute Neutrophil Latest Ref Range: 1.6 - 8.3 10e9/L 7.3   Absolute Lymphocytes Latest Ref Range: 0.8 - 5.3 10e9/L 3.7   Absolute Monocytes Latest Ref Range: 0.0 - 1.3 10e9/L 0.6   Absolute Eosinophils Latest Ref Range: 0.0 - 0.7 10e9/L 0.9 (H)   Absolute Basophils Latest Ref Range: 0.0 - 0.2 10e9/L 0.1   Absolute Nucleated RBC Unknown 1.0   Anisocytosis Unknown Marked   Poikilocytosis Unknown Marked   Polychromasia Unknown Slight   Sickle Cells Unknown  Marked   Target Cells Unknown Slight   Platelet Estimate Unknown Confirming automated cell count   % Retic Latest Ref Range: 0.5 - 2.0 % 24.7 (H)   Absolute Retic Latest Ref Range: 25 - 95 10e9/L 710.2 (H)       Imaging:   None new    Ferriscan 3/25/21  Average liver iron concentration is 43 mg/g dry tissue (normal = 0.17 - 1.8)  Average liver iron concentration is 770 mmol/kg dry tissue (normal = 3 - 33)     Other findings: Diffuse hypodensity of  liver and spleen, consistent with secondary hemochromatosis.     ----------    Assessment:  Jennifer Cervantes is a 21 year old female with HbSS. Her disease has been complicated by stroke leading to significant cognitive delay and right sided hemiparesis, high anxiety leading to frequent pain crises on top of chronic pain syndrome, poor social structure at home with no real support, and iron overload secondary to repeated transfusions. She has had significant stressors at home and her 2021 has been marked by a long admission and separately, PE and SVT + DVT in RUE of unclear chronicity. She also has clear evidence of massive iron overload, which was suspected but is requiring urgent intervention.    Major Topics of the Visit:  1. Pain Management: No change to overall strategy and no refills needed today but she is setting a goal to be off Oxycontin by the end of July. Mental stress is also important here and we need to maintain a support structure and she can engage with her counselor. SW has engaged with her on this    -Crizanlizumab today. Mild pain with infusion so order has been modified to run over 60 minutes. Next infusion in 2 weeks, then monthly  2. Iron overload/Pharmacy Issues: She had Jadenu ordered a few months ago but there have been difficulties getting it at refill time. She is now on Desferal as well. LIC was 43 (3/2021) so we need to act urgently. Desferal HD over 48 hours is going well. Synthetic function for the liver seems to be intact  Repeat Ferriscan in  July (ordered)  3. High RBC turnover: Jennifer really has a high degree of RBC turnover, more than most patients I have seen. Oxybryta led to more frequent pain episodes (chest pain, which was new) and did not change the RBC turnover so it was stopped in December. No change currently  4. Pulmonary Emboli + new RUE DVT (innominate vein) of unclear chronicity + new symptomatic R cephalic SVT this week: Switched to apixaban with full starter pack given the new clots. No obvious trigger. I will treat the SVT primarily given the symptoms, and the timing comes out for 6 weeks to be around the time she would have finished the rivaroxaban. Treating through 7/27. Holding aspirin while on anticoagulation  5. Stroke sequelae on right side: I will reach out to PM&R to see if they may be able to better assist with her weakness. I do not think a surgical approach is warranted at this point. This visit has not yet occurred  6. Mental Health: Refilled arapiprazole and sertraline. Will try to get her re-initiated with Dr. Carter. Some of it is related to treatment in ED. I have now added documentation specifically mentioning the incontinence into the Care Coordination note.  Follow up: 2 weeks for Jr.      Review of external notes as documented above   Review of the result(s) of each unique test - labs as above  Diagnosis or treatment significantly limited by social determinants of health - sickle cell disease, racial inequity, difficult social situation at home  Ordering of specific tests    30 min spent on the date of the encounter in chart review, patient visit, review of tests, documentation and/or discussion with other providers about the issues documented above.       Eric Duncan MD  Hematologist  Division of Hematology, Oncology, and Transplantation  [facility]  [facility]  Pager: [phone]              Again, thank you for allowing me to participate in the care of your patient.         Sincerely,        Eric Duncan MD

## 2021-06-18 NOTE — LETTER
6/18/2021         RE: Jennifer Cervantes  [address]  [facility] [address]      Sickle Cell Outpatient Follow Up Visit      Date of encounter: Jun 18, 2021    Jennifer Cervantes is a 21 year old female who is being seen in clinic for hospital follow up and health maintenance related to SCD      Interval History: Jennifer still has frequent pain. She has not been admitted however. She is here today for her first crizanlizumab (she had some pain with the infusion but was already coming in for pain med infusion as well). Pain is all in her typical locations. No new changes at home--she is still living with her family and she feels it's dysfunctional but she wants to save up money again. No issues with the Xarelto. She has not missed any doses. The chelation on the weekends is going well too.       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 She also has significant anxiety and depression with a strong anxiety about transferring to the adult clinic. She usually has pain crises secondary to the anxiety.      --------------------------------  Plan last reviewed with patient: 3/26/2021    Patient background: 22yo F, enjoys movies and kids though there are times where she does not really want to talk to people. Does not have a lot of social support at home.     Sickle Cell Disease History  Primary Hematologist: Perla  PCP: Raul  Genotype: SS  Acute Pain Crisis Treatment:    ER/Acute Care/Infusion Clinic:   o Morphine 2 mg IVP/SC Q1H X 3 doses  o Toradol x1 (avoid through July due to being on anticoagulation)  o Maintenance IV fluids with LR  o Other: Benadryl PO and Zofran 8 mg IV PRN  itching or nausea    Inpatient:  o Opioid: Morphine 2 mg IV Q1H PRN until PCA starts  o PCA plan:   - PCA button dose: Morphine 1 mg  - Lockout: 20 minutes  - Continuous Infusion: consider 1 mg/hr  - One hr max: 4 mg  o Other Medications: Benadryl, Zofran  o If PCA not working, she Has had success with ketamine starting at 4mg/h and advancing only to 6mg/h, as 8mg/h made her feel quite poorly.  o ASA on hold  o Supportive Care: Docusate, Senna  Chronic Pain Medications:    Home regimen Oxycontin 10 mg q12h, oxycodone 10 mg p.o. q.6 hours p.r.n. breakthrough pain.  She also continues on Celebrex, and Zoloft among other medications.    -Also benefits from mental health visits, acupuncture  Baseline Hemoglobin: 7 g/dl without chronic transfusions  Hydroxyurea use: Yes  H/O blood transfusions: Yes, several (iron overload) Most recent 8/21/20    H/O Transfusion Reactions: no    Antibodies:none  H/O Acute Chest Syndrome: Yes    Last episode: 10/2019     ICU/intubation: unsure  H/O Stroke: Yes (managed with chronic transfusions in the past)  H/O VTE: Yes (2/2021)  H/O Cholecystectomy or Splenectomy: no  H/O Asthma, Pulm HTN, AVN, Leg Ulcers, Nephropathy, Retinopathy, etc: Iron overload, asthma, chronic lung disease, mild developmental delay from early stroke    -------------------------------------------    Sickle Cell Disease Comprehensive Checklist    Bone Health/Avascular Necrosis Screening/Symptoms (each visit): no new concerns today    Leg Ulcer evaluation (every visit): none    Hypertension (every visit): mildly hypertensive recently but improved later in evening and previous day     Last urinalysis for microalbuminuria/CKD (annually): within last year    Last pulmonary evaluation (asthma, AMAN, pulm HTN): within last year    Stroke/silent cerebral infarct Hx (Y/N): Yes TIA ~2014, first event ~age 1 with full stroke and R sided weakness    Last PCP Visit: 8/2020    Vaccines:  o PCV13: 5/13/19  o Pneumovax (PPSV23):  3/04, 10/09, 7/12/19 (next due 7/2024)  o Menactra: 4/2010, 9/2015 (MCV overdue Sept 2020)  o Influenza: got 20-21 vaccine per self report    Audiology (chelation): done 6/2020, normal    Past Medical History:  Past Medical History:   Diagnosis Date     Anxiety      Bleeding disorder (H)      Blood clotting disorder (H)      Cerebral infarction (H) 2015     Cognitive developmental delay     low IQ. 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 (H)     right hand contractures     Iron overload due to repeated red blood cell transfusions      Migraines      Multiple subsegmental pulmonary emboli without acute cor pulmonale (H) 02/01/2021     Oppositional defiant behavior      Superficial venous thrombosis of arm, right 03/25/2021     Uncomplicated asthma        Past Surgical History:  Past Surgical History:   Procedure Laterality Date     AS INSERT TUNNELED CV 2 CATH W/O PORT/PUMP       C BREAST REDUCTION (INCLUDES LIPO) TIER 3 Bilateral 04/23/2019     CHOLECYSTECTOMY       INSERT PORT VASCULAR ACCESS Left 4/21/2021    Procedure: INSERTION, VASCULAR ACCESS PORT (NOT SURE ON SIDE UNTIL REMOVAL);  Surgeon: Rajan More MD;  Location: [facility] OR     IR CHEST PORT PLACEMENT > 5 YRS OF AGE  4/21/2021     IR CVC NON TUNNEL LINE REMOVAL  5/6/2021     IR CVC NON TUNNEL PLACEMENT  04/07/2020     IR CVC NON TUNNEL PLACEMENT  4/30/2021     IR PORT REMOVAL LEFT  4/21/2021     REMOVE PORT VASCULAR ACCESS Left 4/21/2021    Procedure: REMOVAL, VASCULAR ACCESS PORT LEFT;  Surgeon: Rajan More MD;  Location: [facility] OR     REPAIR TENDON ELBOW Right 10/02/2019    Procedure: Right Elbow Flexor Lengthening, Flexor Pronator Slide Of Wrist and Finger, Thumb Adductor Lengthening;  Surgeon: Anai Franco MD;  Location: [facility]     TONSILLECTOMY Bilateral 10/02/2019    Procedure: Bilateral Tonsillectomy;   "Surgeon: Farhana Guy MD;  Location: [facility] OR       Family History:   Family History   Problem Relation Age of Onset     Sickle Cell Trait Mother      Hypertension Mother      Asthma Mother      Sickle Cell Trait Father        Social History:  Social History     Socioeconomic History     Marital status: Single     Spouse name: Not on file     Number of children: Not on file     Years of education: Not on file     Highest education level: Not on file   Occupational History     Not on file   Social Needs     Financial resource strain: Not on file     Food insecurity     Worry: Not on file     Inability: Not on file     Transportation needs     Medical: Not on file     Non-medical: Not on file   Tobacco Use     Smoking status: Never Smoker     Smokeless tobacco: Never Used   Substance and Sexual Activity     Alcohol use: Not Currently     Alcohol/week: 0.0 standard drinks     Drug use: Never     Sexual activity: Not Currently     Partners: Male     Birth control/protection: Other   Lifestyle     Physical activity     Days per week: Not on file     Minutes per session: Not on file     Stress: Not on file   Relationships     Social connections     Talks on phone: Not on file     Gets together: Not on file     Attends Buddhism service: Not on file     Active member of club or organization: Not on file     Attends meetings of clubs or organizations: Not on file     Relationship status: Not on file     Intimate partner violence     Fear of current or ex partner: Not on file     Emotionally abused: Not on file     Physically abused: Not on file     Forced sexual activity: Not on file   Other Topics Concern     Parent/sibling w/ CABG, MI or angioplasty before 65F 55M? Not Asked   Social History Narrative    Lives with mom and extended family but \"toxic environment\" per her report. She would like to move out but cannot financially do so. She has minimal support at home despite her significant SCD comorbidities and " cognitive delay from stroke.       Medications:  Current Outpatient Medications   Medication     acetaminophen (TYLENOL) 325 MG tablet     albuterol (PROAIR HFA/PROVENTIL HFA/VENTOLIN HFA) 108 (90 Base) MCG/ACT inhaler     albuterol (PROVENTIL) (2.5 MG/3ML) 0.083% neb solution     ARIPiprazole (ABILIFY) 2 MG tablet     budesonide-formoterol (SYMBICORT) 160-4.5 MCG/ACT Inhaler     diclofenac (VOLTAREN) 1 % topical gel     diphenhydrAMINE (BENADRYL) 25 MG capsule     EPINEPHrine (ANY BX GENERIC EQUIV) 0.3 MG/0.3ML injection 2-pack     Hydroxyurea 1000 MG TABS     hydrOXYzine (ATARAX) 25 MG tablet     JADENU 360 MG tablet     lidocaine-prilocaine (EMLA) 2.5-2.5 % external cream     medroxyPROGESTERone (DEPO-PROVERA) 150 MG/ML IM injection     naloxone (NARCAN) 4 MG/0.1ML nasal spray     ondansetron (ZOFRAN) 8 MG tablet     oxyCODONE (OXYCONTIN) 10 MG 12 hr tablet     oxyCODONE IR (ROXICODONE) 15 MG tablet     rivaroxaban ANTICOAGULANT (XARELTO ANTICOAGULANT) 20 MG TABS tablet     sertraline (ZOLOFT) 100 MG tablet     Current Facility-Administered Medications   Medication     medroxyPROGESTERone (DEPO-PROVERA) syringe 150 mg         Physical Exam:   /74 (BP Location: Right arm, Patient Position: Sitting, Cuff Size: Adult Regular)   Pulse 101   Temp 98.5  F (36.9  C) (Tympanic)   Resp 16   Wt 75.7 kg (166 lb 12.8 oz)   SpO2 93%   BMI 28.63 kg/m       GEN: young  female, lying on hospital bed getting infusion  HEENT: no icterus, MMM, new hair color  CV: RRR, no murmurs  NECK: no visible asymmetry  RESP: speaking in full sentences, no increased work of breathing, clear to auscultation  SKIN: no bruising noted  MSK: contracture at right wrist (chronic), now with slight edema compared to previous visits but non painful to touch  NEURO: alert and oriented      Labs:   Results for HIMANSHU AL (MRN [id number]) as of 6/21/2021 10:29   Ref. Range 6/18/2021 11:20   Sodium Latest Ref Range: 133 - 144  mmol/L 140   Potassium Latest Ref Range: 3.4 - 5.3 mmol/L 3.8   Chloride Latest Ref Range: 94 - 109 mmol/L 109   Carbon Dioxide Latest Ref Range: 20 - 32 mmol/L 22   Urea Nitrogen Latest Ref Range: 7 - 30 mg/dL 8   Creatinine Latest Ref Range: 0.52 - 1.04 mg/dL 0.51 (L)   GFR Estimate Latest Ref Range: >60 mL/min/1.73_m2 >90   GFR Estimate If Black Latest Ref Range: >60 mL/min/1.73_m2 >90   Calcium Latest Ref Range: 8.5 - 10.1 mg/dL 8.4 (L)   Anion Gap Latest Ref Range: 3 - 14 mmol/L 9   Ferritin Latest Ref Range: 12 - 150 ng/mL 8,923 (H)   Glucose Latest Ref Range: 70 - 99 mg/dL 97   WBC Latest Ref Range: 4.0 - 11.0 10e9/L 12.6 (H)   Hemoglobin Latest Ref Range: 11.7 - 15.7 g/dL 8.9 (L)   Hematocrit Latest Ref Range: 35.0 - 47.0 % 25.2 (L)   Platelet Count Latest Ref Range: 150 - 450 10e9/L 371   RBC Count Latest Ref Range: 3.8 - 5.2 10e12/L 2.87 (L)   MCV Latest Ref Range: 78 - 100 fl 88   MCH Latest Ref Range: 26.5 - 33.0 pg 31.0   MCHC Latest Ref Range: 31.5 - 36.5 g/dL 35.3   RDW Latest Ref Range: 10.0 - 15.0 % 22.5 (H)   Diff Method Unknown Manual Differential   % Neutrophils Latest Units: % 58.0   % Lymphocytes Latest Units: % 29.0   % Monocytes Latest Units: % 5.0   % Eosinophils Latest Units: % 7.0   % Basophils Latest Units: % 1.0   Nucleated RBCs Latest Ref Range: 0 /100 8 (H)   Absolute Neutrophil Latest Ref Range: 1.6 - 8.3 10e9/L 7.3   Absolute Lymphocytes Latest Ref Range: 0.8 - 5.3 10e9/L 3.7   Absolute Monocytes Latest Ref Range: 0.0 - 1.3 10e9/L 0.6   Absolute Eosinophils Latest Ref Range: 0.0 - 0.7 10e9/L 0.9 (H)   Absolute Basophils Latest Ref Range: 0.0 - 0.2 10e9/L 0.1   Absolute Nucleated RBC Unknown 1.0   Anisocytosis Unknown Marked   Poikilocytosis Unknown Marked   Polychromasia Unknown Slight   Sickle Cells Unknown Marked   Target Cells Unknown Slight   Platelet Estimate Unknown Confirming automated cell count   % Retic Latest Ref Range: 0.5 - 2.0 % 24.7 (H)   Absolute Retic Latest Ref  Range: 25 - 95 10e9/L 710.2 (H)       Imaging:   None new    Ferriscan 3/25/21  Average liver iron concentration is 43 mg/g dry tissue (normal = 0.17 - 1.8)  Average liver iron concentration is 770 mmol/kg dry tissue (normal = 3 - 33)     Other findings: Diffuse hypodensity of  liver and spleen, consistent with secondary hemochromatosis.     ----------    Assessment:  Jennifer Cervantes is a 21 year old female with HbSS. Her disease has been complicated by stroke leading to significant cognitive delay and right sided hemiparesis, high anxiety leading to frequent pain crises on top of chronic pain syndrome, poor social structure at home with no real support, and iron overload secondary to repeated transfusions. She has had significant stressors at home and her 2021 has been marked by a long admission and separately, PE and SVT + DVT in RUE of unclear chronicity. She also has clear evidence of massive iron overload, which was suspected but is requiring urgent intervention.    Major Topics of the Visit:  1. Pain Management: No change to overall strategy and no refills needed today but she is setting a goal to be off Oxycontin by the end of July. Mental stress is also important here and we need to maintain a support structure and she can engage with her counselor. SW has engaged with her on this    -Crizanlizumab today. Mild pain with infusion so order has been modified to run over 60 minutes. Next infusion in 2 weeks, then monthly  2. Iron overload/Pharmacy Issues: She had Jadenu ordered a few months ago but there have been difficulties getting it at refill time. She is now on Desferal as well. LIC was 43 (3/2021) so we need to act urgently. Desferal HD over 48 hours is going well. Synthetic function for the liver seems to be intact  Repeat Ferriscan in July (ordered)  3. High RBC turnover: Jennifer really has a high degree of RBC turnover, more than most patients I have seen. Oxybryta led to more frequent pain episodes  (chest pain, which was new) and did not change the RBC turnover so it was stopped in December. No change currently  4. Pulmonary Emboli + new RUE DVT (innominate vein) of unclear chronicity + new symptomatic R cephalic SVT this week: Switched to apixaban with full starter pack given the new clots. No obvious trigger. I will treat the SVT primarily given the symptoms, and the timing comes out for 6 weeks to be around the time she would have finished the rivaroxaban. Treating through 7/27. Holding aspirin while on anticoagulation  5. Stroke sequelae on right side: I will reach out to PM&R to see if they may be able to better assist with her weakness. I do not think a surgical approach is warranted at this point. This visit has not yet occurred  6. Mental Health: Refilled arapiprazole and sertraline. Will try to get her re-initiated with Dr. Carter. Some of it is related to treatment in ED. I have now added documentation specifically mentioning the incontinence into the Care Coordination note.  Follow up: 2 weeks for Jr.      Review of external notes as documented above   Review of the result(s) of each unique test - labs as above  Diagnosis or treatment significantly limited by social determinants of health - sickle cell disease, racial inequity, difficult social situation at home  Ordering of specific tests    30 min spent on the date of the encounter in chart review, patient visit, review of tests, documentation and/or discussion with other providers about the issues documented above.       Eric Duncan MD  Hematologist  Division of Hematology, Oncology, and Transplantation  [facility]  [facility]  Pager: [phone]                Eric Duncan MD

## 2021-06-18 NOTE — PROGRESS NOTES
This is a recent snapshot of the patient's [facility] medical record.  For current drug dose and complete information and questions, call [phone] or In [facility] pool, fv home infusion ([id number])  CSN Number:  [id number]

## 2021-06-20 ENCOUNTER — HOSPITAL ENCOUNTER (EMERGENCY)
Facility: CLINIC | Age: 21
Discharge: HOME | End: 2021-06-20
Attending: EMERGENCY MEDICINE | Admitting: EMERGENCY MEDICINE
Payer: COMMERCIAL

## 2021-06-20 VITALS
TEMPERATURE: 98.7 F | SYSTOLIC BLOOD PRESSURE: 124 MMHG | HEART RATE: 98 BPM | OXYGEN SATURATION: 93 % | BODY MASS INDEX: 28.68 KG/M2 | WEIGHT: 168 LBS | RESPIRATION RATE: 16 BRPM | DIASTOLIC BLOOD PRESSURE: 54 MMHG | HEIGHT: 64 IN

## 2021-06-20 DIAGNOSIS — D57.00 SICKLE CELL PAIN CRISIS (H): ICD-10-CM

## 2021-06-20 LAB
ALBUMIN SERPL-MCNC: 4.1 G/DL (ref 3.4–5)
ALP SERPL-CCNC: 74 U/L (ref 40–150)
ALT SERPL W P-5'-P-CCNC: 73 U/L (ref 0–50)
ANION GAP SERPL CALCULATED.3IONS-SCNC: 7 MMOL/L (ref 3–14)
AST SERPL W P-5'-P-CCNC: 68 U/L (ref 0–45)
BASOPHILS # BLD AUTO: 0.3 10E9/L (ref 0–0.2)
BASOPHILS NFR BLD AUTO: 1.8 %
BILIRUB SERPL-MCNC: 2.5 MG/DL (ref 0.2–1.3)
BUN SERPL-MCNC: 10 MG/DL (ref 7–30)
CALCIUM SERPL-MCNC: 8.8 MG/DL (ref 8.5–10.1)
CHLORIDE SERPL-SCNC: 110 MMOL/L (ref 94–109)
CO2 SERPL-SCNC: 22 MMOL/L (ref 20–32)
CREAT SERPL-MCNC: 0.53 MG/DL (ref 0.52–1.04)
DIFFERENTIAL METHOD BLD: ABNORMAL
EOSINOPHIL # BLD AUTO: 1.4 10E9/L (ref 0–0.7)
EOSINOPHIL NFR BLD AUTO: 8.9 %
ERYTHROCYTE [DISTWIDTH] IN BLOOD BY AUTOMATED COUNT: 22.3 % (ref 10–15)
GFR SERPL CREATININE-BSD FRML MDRD: >90 ML/MIN/{1.73_M2}
GLUCOSE SERPL-MCNC: 87 MG/DL (ref 70–99)
HCT VFR BLD AUTO: 24.3 % (ref 35–47)
HGB BLD-MCNC: 8.6 G/DL (ref 11.7–15.7)
IMM GRANULOCYTES # BLD: 0.1 10E9/L (ref 0–0.4)
IMM GRANULOCYTES NFR BLD: 0.4 %
LACTATE BLD-SCNC: 0.9 MMOL/L (ref 0.7–2)
LYMPHOCYTES # BLD AUTO: 4 10E9/L (ref 0.8–5.3)
LYMPHOCYTES NFR BLD AUTO: 25.3 %
MCH RBC QN AUTO: 31 PG (ref 26.5–33)
MCHC RBC AUTO-ENTMCNC: 35.4 G/DL (ref 31.5–36.5)
MCV RBC AUTO: 88 FL (ref 78–100)
MONOCYTES # BLD AUTO: 1.5 10E9/L (ref 0–1.3)
MONOCYTES NFR BLD AUTO: 9.3 %
NEUTROPHILS # BLD AUTO: 8.5 10E9/L (ref 1.6–8.3)
NEUTROPHILS NFR BLD AUTO: 54.3 %
NRBC # BLD AUTO: 0.4 10*3/UL
NRBC BLD AUTO-RTO: 3 /100
PLATELET # BLD AUTO: 350 10E9/L (ref 150–450)
POTASSIUM SERPL-SCNC: 3.8 MMOL/L (ref 3.4–5.3)
PROT SERPL-MCNC: 8.2 G/DL (ref 6.8–8.8)
RBC # BLD AUTO: 2.77 10E12/L (ref 3.8–5.2)
RETICS # AUTO: 629.9 10E9/L (ref 25–95)
RETICS/RBC NFR AUTO: 22.7 % (ref 0.5–2)
SODIUM SERPL-SCNC: 138 MMOL/L (ref 133–144)
WBC # BLD AUTO: 15.7 10E9/L (ref 4–11)

## 2021-06-20 PROCEDURE — 258N000003 HC RX IP 258 OP 636: Performed by: EMERGENCY MEDICINE

## 2021-06-20 PROCEDURE — 80053 COMPREHEN METABOLIC PANEL: CPT | Performed by: EMERGENCY MEDICINE

## 2021-06-20 PROCEDURE — 250N000011 HC RX IP 250 OP 636: Performed by: EMERGENCY MEDICINE

## 2021-06-20 PROCEDURE — 99284 EMERGENCY DEPT VISIT MOD MDM: CPT | Performed by: EMERGENCY MEDICINE

## 2021-06-20 PROCEDURE — 99285 EMERGENCY DEPT VISIT HI MDM: CPT | Mod: 25 | Performed by: EMERGENCY MEDICINE

## 2021-06-20 PROCEDURE — 96374 THER/PROPH/DIAG INJ IV PUSH: CPT | Performed by: EMERGENCY MEDICINE

## 2021-06-20 PROCEDURE — 96376 TX/PRO/DX INJ SAME DRUG ADON: CPT | Performed by: EMERGENCY MEDICINE

## 2021-06-20 PROCEDURE — 85045 AUTOMATED RETICULOCYTE COUNT: CPT | Performed by: EMERGENCY MEDICINE

## 2021-06-20 PROCEDURE — 96361 HYDRATE IV INFUSION ADD-ON: CPT | Performed by: EMERGENCY MEDICINE

## 2021-06-20 PROCEDURE — 85025 COMPLETE CBC W/AUTO DIFF WBC: CPT | Performed by: EMERGENCY MEDICINE

## 2021-06-20 PROCEDURE — 83605 ASSAY OF LACTIC ACID: CPT | Performed by: EMERGENCY MEDICINE

## 2021-06-20 RX ORDER — DIPHENHYDRAMINE HYDROCHLORIDE 50 MG/ML
25 INJECTION INTRAMUSCULAR; INTRAVENOUS
Status: DISCONTINUED | OUTPATIENT
Start: 2021-06-20 | End: 2021-06-20 | Stop reason: HOSPADM

## 2021-06-20 RX ORDER — SODIUM CHLORIDE, SODIUM LACTATE, POTASSIUM CHLORIDE, CALCIUM CHLORIDE 600; 310; 30; 20 MG/100ML; MG/100ML; MG/100ML; MG/100ML
1000 INJECTION, SOLUTION INTRAVENOUS CONTINUOUS
Status: DISCONTINUED | OUTPATIENT
Start: 2021-06-20 | End: 2021-06-20 | Stop reason: HOSPADM

## 2021-06-20 RX ORDER — ONDANSETRON 2 MG/ML
4 INJECTION INTRAMUSCULAR; INTRAVENOUS ONCE
Status: DISCONTINUED | OUTPATIENT
Start: 2021-06-20 | End: 2021-06-20 | Stop reason: HOSPADM

## 2021-06-20 RX ORDER — HEPARIN SODIUM (PORCINE) LOCK FLUSH IV SOLN 100 UNIT/ML 100 UNIT/ML
5 SOLUTION INTRAVENOUS ONCE
Status: COMPLETED | OUTPATIENT
Start: 2021-06-20 | End: 2021-06-20

## 2021-06-20 RX ORDER — MORPHINE SULFATE 4 MG/ML
2 INJECTION, SOLUTION INTRAMUSCULAR; INTRAVENOUS
Status: COMPLETED | OUTPATIENT
Start: 2021-06-20 | End: 2021-06-20

## 2021-06-20 RX ADMIN — HEPARIN 5 ML: 100 SYRINGE at 06:09

## 2021-06-20 RX ADMIN — SODIUM CHLORIDE, POTASSIUM CHLORIDE, SODIUM LACTATE AND CALCIUM CHLORIDE 1000 ML: 600; 310; 30; 20 INJECTION, SOLUTION INTRAVENOUS at 03:14

## 2021-06-20 RX ADMIN — MORPHINE SULFATE 2 MG: 4 INJECTION INTRAVENOUS at 05:28

## 2021-06-20 RX ADMIN — MORPHINE SULFATE 2 MG: 4 INJECTION INTRAVENOUS at 03:14

## 2021-06-20 RX ADMIN — MORPHINE SULFATE 2 MG: 4 INJECTION INTRAVENOUS at 04:19

## 2021-06-20 ASSESSMENT — MIFFLIN-ST. JEOR: SCORE: 1507.04

## 2021-06-20 NOTE — ED TRIAGE NOTES
Recently started on a new medication for sickle cell, reports pain increased since medication started. C/o pain all over 10/10

## 2021-06-20 NOTE — ED PROVIDER NOTES
ED Provider Note  [facility]      History     Chief Complaint   Patient presents with     Sickle Cell Pain Crisis     HPI  Jennifer Cervantes is a 21 year old female who has a PMHx of sickle cell disease, PE on xarelto, prior CVA presenting with sickle cell pain. Patient just started new outpatient medication infusion clinic yesterday.  She is having a pain crisis which is typical of her pain crises.  Patient denies any new pain, chest pain or shortness of breath. No fevers, chills, nausea, vomiting.    Past Medical History  Past Medical History:   Diagnosis Date     Anxiety      Bleeding disorder (H)      Blood clotting disorder (H)      Cerebral infarction (H) 2015     Cognitive developmental delay     low IQ. 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 (H)     right hand contractures     Iron overload due to repeated red blood cell transfusions      Migraines      Multiple subsegmental pulmonary emboli without acute cor pulmonale (H) 02/01/2021     Oppositional defiant behavior      Superficial venous thrombosis of arm, right 03/25/2021     Uncomplicated asthma      Past Surgical History:   Procedure Laterality Date     AS INSERT TUNNELED CV 2 CATH W/O PORT/PUMP       C BREAST REDUCTION (INCLUDES LIPO) TIER 3 Bilateral 04/23/2019     CHOLECYSTECTOMY       INSERT PORT VASCULAR ACCESS Left 4/21/2021    Procedure: INSERTION, VASCULAR ACCESS PORT (NOT SURE ON SIDE UNTIL REMOVAL);  Surgeon: Rajan More MD;  Location: [facility] OR     IR CHEST PORT PLACEMENT > 5 YRS OF AGE  4/21/2021     IR CVC NON TUNNEL LINE REMOVAL  5/6/2021     IR CVC NON TUNNEL PLACEMENT  04/07/2020     IR CVC NON TUNNEL PLACEMENT  4/30/2021     IR PORT REMOVAL LEFT  4/21/2021     REMOVE PORT VASCULAR ACCESS Left 4/21/2021    Procedure: REMOVAL, VASCULAR ACCESS PORT LEFT;  Surgeon: Rajan More MD;   Location: [facility] OR     REPAIR TENDON ELBOW Right 10/02/2019    Procedure: Right Elbow Flexor Lengthening, Flexor Pronator Slide Of Wrist and Finger, Thumb Adductor Lengthening;  Surgeon: Anai Franco MD;  [facility]     TONSILLECTOMY Bilateral 10/02/2019    Procedure: Bilateral Tonsillectomy;  Surgeon: Farhana Guy MD;  [facility] OR     oxyCODONE (OXYCONTIN) 10 MG 12 hr tablet  oxyCODONE IR (ROXICODONE) 15 MG tablet  acetaminophen (TYLENOL) 325 MG tablet  albuterol (PROAIR HFA/PROVENTIL HFA/VENTOLIN HFA) 108 (90 Base) MCG/ACT inhaler  albuterol (PROVENTIL) (2.5 MG/3ML) 0.083% neb solution  ARIPiprazole (ABILIFY) 2 MG tablet  budesonide-formoterol (SYMBICORT) 160-4.5 MCG/ACT Inhaler  diclofenac (VOLTAREN) 1 % topical gel  diphenhydrAMINE (BENADRYL) 25 MG capsule  EPINEPHrine (ANY BX GENERIC EQUIV) 0.3 MG/0.3ML injection 2-pack  Hydroxyurea 1000 MG TABS  hydrOXYzine (ATARAX) 25 MG tablet  JADENU 360 MG tablet  lidocaine-prilocaine (EMLA) 2.5-2.5 % external cream  medroxyPROGESTERone (DEPO-PROVERA) 150 MG/ML IM injection  naloxone (NARCAN) 4 MG/0.1ML nasal spray  ondansetron (ZOFRAN) 8 MG tablet  rivaroxaban ANTICOAGULANT (XARELTO ANTICOAGULANT) 20 MG TABS tablet  sertraline (ZOLOFT) 100 MG tablet      Allergies   Allergen Reactions     Contrast Dye      Hives and breathing issues     Fish-Derived Products Hives     Seafood Hives     Diagnostic X-Ray Materials      Gadolinium      Family History  Family History   Problem Relation Age of Onset     Sickle Cell Trait Mother      Hypertension Mother      Asthma Mother      Sickle Cell Trait Father      Social History   Social History     Tobacco Use     Smoking status: Never Smoker     Smokeless tobacco: Never Used   Substance Use Topics     Alcohol use: Not Currently     Alcohol/week: 0.0 standard drinks     Drug use: Never      Past medical history, past surgical history, medications, allergies, family history, and social history were  "reviewed with the patient. No additional pertinent items.       Review of Systems  A complete review of systems was performed with pertinent positives and negatives noted in the HPI, and all other systems negative.    Physical Exam   BP: 133/87  Pulse: 116  Temp: 98.7  F (37.1  C)  Resp: 16  Height: 162.6 cm (5' 4\")  Weight: 76.2 kg (168 lb)  SpO2: 99 %  Physical Exam  Physical Exam   Constitutional: oriented to person, place, and time. appears well-developed and well-nourished.   HENT:   Head: Normocephalic and atraumatic.   Neck: Normal range of motion. No nuchal rigidity.  Pulmonary/Chest: Effort normal. No respiratory distress.   Cardiac: No murmurs, rubs, gallops. RRR.  Abdominal: Abdomen soft, nontender, nondistended. No rebound tenderness.  MSK: Long bones without deformity or evidence of trauma.  Nontender to palpation over the bilateral humerus, elbows, forearms or wrists.  Nontender palpation over the hip joints or legs.  Neurological: alert and oriented to person, place, and time.   Skin: Skin is warm and dry.   Psychiatric:  normal mood and affect.  behavior is normal. Thought content normal.     ED Course      Procedures             No results found for any visits on 06/20/21.  Medications - No data to display     Assessments & Plan (with Medical Decision Making)   MDM  Patient presenting with pain that is consistent with typical sickle cell pain crises.  She believes that this was set off by any medication.  She has no other complaint such as fevers, chest pain or shortness of breath to suggest acute chest syndrome, ACS, pulmonary embolism.  She was given pain meds per her acute pain management protocol.  Pain is now relieved.  The patient like to be discharged.  She has no complaints on repeat evaluation and exam has been benign.  She is mildly tachycardic which is similar to multiple visits vital signs.  She will call her hematologist regarding new medication and return if she is worsening.    I have " reviewed the nursing notes. I have reviewed the findings, diagnosis, plan and need for follow up with the patient.    New Prescriptions    No medications on file       Final diagnoses:   Sickle cell pain crisis (H)       --  Andrew Chou  [facility] EMERGENCY DEPARTMENT  6/20/2021     Andrew Chou MD  06/20/21 0610

## 2021-06-20 NOTE — DISCHARGE INSTRUCTIONS
Please call your hematologist regarding the new medication    Return to the emergency department if you have any further concerns.

## 2021-06-21 ENCOUNTER — HOSPITAL ENCOUNTER (EMERGENCY)
Facility: CLINIC | Age: 21
Discharge: HOME | End: 2021-06-21
Attending: EMERGENCY MEDICINE | Admitting: EMERGENCY MEDICINE
Payer: COMMERCIAL

## 2021-06-21 VITALS
DIASTOLIC BLOOD PRESSURE: 82 MMHG | RESPIRATION RATE: 16 BRPM | OXYGEN SATURATION: 99 % | TEMPERATURE: 97.6 F | SYSTOLIC BLOOD PRESSURE: 99 MMHG | HEART RATE: 86 BPM

## 2021-06-21 DIAGNOSIS — D57.00 SICKLE CELL PAIN CRISIS (H): ICD-10-CM

## 2021-06-21 LAB
ALBUMIN SERPL-MCNC: 3.8 G/DL (ref 3.4–5)
ALP SERPL-CCNC: 72 U/L (ref 40–150)
ALT SERPL W P-5'-P-CCNC: 80 U/L (ref 0–50)
ANION GAP SERPL CALCULATED.3IONS-SCNC: 4 MMOL/L (ref 3–14)
AST SERPL W P-5'-P-CCNC: 82 U/L (ref 0–45)
BASOPHILS # BLD AUTO: 0.3 10E9/L (ref 0–0.2)
BASOPHILS NFR BLD AUTO: 2.2 %
BILIRUB SERPL-MCNC: 2.9 MG/DL (ref 0.2–1.3)
BUN SERPL-MCNC: 8 MG/DL (ref 7–30)
CALCIUM SERPL-MCNC: 8.5 MG/DL (ref 8.5–10.1)
CHLORIDE SERPL-SCNC: 111 MMOL/L (ref 94–109)
CO2 SERPL-SCNC: 24 MMOL/L (ref 20–32)
CREAT SERPL-MCNC: 0.5 MG/DL (ref 0.52–1.04)
DIFFERENTIAL METHOD BLD: ABNORMAL
EOSINOPHIL # BLD AUTO: 1.5 10E9/L (ref 0–0.7)
EOSINOPHIL NFR BLD AUTO: 11.4 %
ERYTHROCYTE [DISTWIDTH] IN BLOOD BY AUTOMATED COUNT: 22.3 % (ref 10–15)
GFR SERPL CREATININE-BSD FRML MDRD: >90 ML/MIN/{1.73_M2}
GLUCOSE SERPL-MCNC: 100 MG/DL (ref 70–99)
HCT VFR BLD AUTO: 23.6 % (ref 35–47)
HGB BLD-MCNC: 8.2 G/DL (ref 11.7–15.7)
IMM GRANULOCYTES # BLD: 0.1 10E9/L (ref 0–0.4)
IMM GRANULOCYTES NFR BLD: 0.4 %
LACTATE BLD-SCNC: 0.9 MMOL/L (ref 0.7–2)
LYMPHOCYTES # BLD AUTO: 3.6 10E9/L (ref 0.8–5.3)
LYMPHOCYTES NFR BLD AUTO: 27.4 %
MCH RBC QN AUTO: 31.2 PG (ref 26.5–33)
MCHC RBC AUTO-ENTMCNC: 34.7 G/DL (ref 31.5–36.5)
MCV RBC AUTO: 90 FL (ref 78–100)
MONOCYTES # BLD AUTO: 1.1 10E9/L (ref 0–1.3)
MONOCYTES NFR BLD AUTO: 8.6 %
NEUTROPHILS # BLD AUTO: 6.6 10E9/L (ref 1.6–8.3)
NEUTROPHILS NFR BLD AUTO: 50 %
NRBC # BLD AUTO: 0.3 10*3/UL
NRBC BLD AUTO-RTO: 3 /100
PLATELET # BLD AUTO: 250 10E9/L (ref 150–450)
POTASSIUM SERPL-SCNC: 4.1 MMOL/L (ref 3.4–5.3)
PROT SERPL-MCNC: 7.7 G/DL (ref 6.8–8.8)
RBC # BLD AUTO: 2.63 10E12/L (ref 3.8–5.2)
RETICS # AUTO: 625.9 10E9/L (ref 25–95)
RETICS/RBC NFR AUTO: 23.8 % (ref 0.5–2)
SODIUM SERPL-SCNC: 139 MMOL/L (ref 133–144)
WBC # BLD AUTO: 13.1 10E9/L (ref 4–11)

## 2021-06-21 PROCEDURE — 96374 THER/PROPH/DIAG INJ IV PUSH: CPT

## 2021-06-21 PROCEDURE — 85025 COMPLETE CBC W/AUTO DIFF WBC: CPT | Performed by: EMERGENCY MEDICINE

## 2021-06-21 PROCEDURE — 99285 EMERGENCY DEPT VISIT HI MDM: CPT | Performed by: EMERGENCY MEDICINE

## 2021-06-21 PROCEDURE — 250N000011 HC RX IP 250 OP 636: Performed by: EMERGENCY MEDICINE

## 2021-06-21 PROCEDURE — 80053 COMPREHEN METABOLIC PANEL: CPT | Performed by: EMERGENCY MEDICINE

## 2021-06-21 PROCEDURE — 83605 ASSAY OF LACTIC ACID: CPT | Performed by: EMERGENCY MEDICINE

## 2021-06-21 PROCEDURE — 99285 EMERGENCY DEPT VISIT HI MDM: CPT | Mod: 25

## 2021-06-21 PROCEDURE — 96376 TX/PRO/DX INJ SAME DRUG ADON: CPT

## 2021-06-21 PROCEDURE — 85045 AUTOMATED RETICULOCYTE COUNT: CPT | Performed by: EMERGENCY MEDICINE

## 2021-06-21 RX ORDER — MORPHINE SULFATE 2 MG/ML
2 INJECTION, SOLUTION INTRAMUSCULAR; INTRAVENOUS
Status: DISCONTINUED | OUTPATIENT
Start: 2021-06-21 | End: 2021-06-21 | Stop reason: HOSPADM

## 2021-06-21 RX ORDER — HEPARIN SODIUM (PORCINE) LOCK FLUSH IV SOLN 100 UNIT/ML 100 UNIT/ML
5 SOLUTION INTRAVENOUS
Status: DISCONTINUED | OUTPATIENT
Start: 2021-06-21 | End: 2021-06-21 | Stop reason: HOSPADM

## 2021-06-21 RX ADMIN — MORPHINE SULFATE 2 MG: 2 INJECTION, SOLUTION INTRAMUSCULAR; INTRAVENOUS at 05:30

## 2021-06-21 RX ADMIN — MORPHINE SULFATE 2 MG: 2 INJECTION, SOLUTION INTRAMUSCULAR; INTRAVENOUS at 02:49

## 2021-06-21 RX ADMIN — Medication 5 ML: at 06:59

## 2021-06-21 RX ADMIN — MORPHINE SULFATE 2 MG: 2 INJECTION, SOLUTION INTRAMUSCULAR; INTRAVENOUS at 04:21

## 2021-06-21 ASSESSMENT — ENCOUNTER SYMPTOMS
CONFUSION: 0
BRUISES/BLEEDS EASILY: 0
MYALGIAS: 1
ABDOMINAL PAIN: 0
DYSURIA: 0
FEVER: 0
SHORTNESS OF BREATH: 0
ARTHRALGIAS: 1
WEAKNESS: 0
BACK PAIN: 1

## 2021-06-21 NOTE — ED NOTES
Pt reports that she recently started taking a new medication from her hematologist that has a side effect of worsening sickle cell pain. The patient rates the pain 10/10 and reports it is all over her body, but hurts most in her back.

## 2021-06-21 NOTE — ED PROVIDER NOTES
ED Provider Note  [facility]      History     Chief Complaint   Patient presents with     Sickle Cell Pain Crisis     HPI  Jennifer Cervantes is a 21 year old female who has a PMHx of sickle cell disease, PE on xarelto, prior CVA presenting with sickle cell pain.  Patient presents tonight that she recently was starting a new medication via IV infusion on Friday called IV Jr (IV Crizanlizumab-tmca (ADAKVEO). Patient reports that since that time of getting the IV infusion she has had significantly worsening of her sickle cell pain.  Patient complains of pain all over her body specifically in her back and her joints which is similar to her sickle cell pain crisis however seems to be slightly increased.  Patient describes the pain as a constant sharp pain with no relieving factors.  Patient has been taking her oxycodone, OxyContin, and Tylenol at home with no improvement of her symptoms.  Patient denies any fever, chills, nausea, vomiting, chest pain, shortness of breath, no recent illnesses.  Patient was seen in the emergency department yesterday for similar symptoms however was feeling better after IV pain medication in the emergency department.    Past Medical History  Past Medical History:   Diagnosis Date     Anxiety      Bleeding disorder (H)      Blood clotting disorder (H)      Cerebral infarction (H) 2015     Cognitive developmental delay     low IQ. 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 (H)     right hand contractures     Iron overload due to repeated red blood cell transfusions      Migraines      Multiple subsegmental pulmonary emboli without acute cor pulmonale (H) 02/01/2021     Oppositional defiant behavior      Superficial venous thrombosis of arm, right 03/25/2021     Uncomplicated asthma      Past Surgical History:   Procedure Laterality Date      AS INSERT TUNNELED CV 2 CATH W/O PORT/PUMP       C BREAST REDUCTION (INCLUDES LIPO) TIER 3 Bilateral 04/23/2019     CHOLECYSTECTOMY       INSERT PORT VASCULAR ACCESS Left 4/21/2021    Procedure: INSERTION, VASCULAR ACCESS PORT (NOT SURE ON SIDE UNTIL REMOVAL);  Surgeon: Rajan More MD;  Location: [facility] OR     IR CHEST PORT PLACEMENT > 5 YRS OF AGE  4/21/2021     IR CVC NON TUNNEL LINE REMOVAL  5/6/2021     IR CVC NON TUNNEL PLACEMENT  04/07/2020     IR CVC NON TUNNEL PLACEMENT  4/30/2021     IR PORT REMOVAL LEFT  4/21/2021     REMOVE PORT VASCULAR ACCESS Left 4/21/2021    Procedure: REMOVAL, VASCULAR ACCESS PORT LEFT;  Surgeon: Rajan More MD;  Location: [facility] OR     REPAIR TENDON ELBOW Right 10/02/2019    Procedure: Right Elbow Flexor Lengthening, Flexor Pronator Slide Of Wrist and Finger, Thumb Adductor Lengthening;  Surgeon: Anai Franco MD;  Location: [facility] OR     TONSILLECTOMY Bilateral 10/02/2019    Procedure: Bilateral Tonsillectomy;  Surgeon: Farhana Guy MD;  Location: [facility] OR     acetaminophen (TYLENOL) 325 MG tablet  albuterol (PROAIR HFA/PROVENTIL HFA/VENTOLIN HFA) 108 (90 Base) MCG/ACT inhaler  albuterol (PROVENTIL) (2.5 MG/3ML) 0.083% neb solution  ARIPiprazole (ABILIFY) 2 MG tablet  budesonide-formoterol (SYMBICORT) 160-4.5 MCG/ACT Inhaler  diclofenac (VOLTAREN) 1 % topical gel  diphenhydrAMINE (BENADRYL) 25 MG capsule  EPINEPHrine (ANY BX GENERIC EQUIV) 0.3 MG/0.3ML injection 2-pack  Hydroxyurea 1000 MG TABS  hydrOXYzine (ATARAX) 25 MG tablet  JADENU 360 MG tablet  lidocaine-prilocaine (EMLA) 2.5-2.5 % external cream  medroxyPROGESTERone (DEPO-PROVERA) 150 MG/ML IM injection  naloxone (NARCAN) 4 MG/0.1ML nasal spray  ondansetron (ZOFRAN) 8 MG tablet  oxyCODONE (OXYCONTIN) 10 MG 12 hr tablet  oxyCODONE IR (ROXICODONE) 15 MG tablet  rivaroxaban ANTICOAGULANT (XARELTO ANTICOAGULANT) 20 MG TABS tablet  sertraline (ZOLOFT) 100 MG tablet      Allergies   Allergen Reactions      Contrast Dye      Hives and breathing issues     Fish-Derived Products Hives     Seafood Hives     Diagnostic X-Ray Materials      Gadolinium      Family History  Family History   Problem Relation Age of Onset     Sickle Cell Trait Mother      Hypertension Mother      Asthma Mother      Sickle Cell Trait Father      Social History   Social History     Tobacco Use     Smoking status: Never Smoker     Smokeless tobacco: Never Used   Substance Use Topics     Alcohol use: Not Currently     Alcohol/week: 0.0 standard drinks     Drug use: Never      Past medical history, past surgical history, medications, allergies, family history, and social history were reviewed with the patient. No additional pertinent items.       Review of Systems   Constitutional: Negative for fever.   HENT: Negative for congestion.    Eyes: Negative for visual disturbance.   Respiratory: Negative for shortness of breath.    Cardiovascular: Negative for chest pain.   Gastrointestinal: Negative for abdominal pain.   Endocrine: Negative for polyuria.   Genitourinary: Negative for dysuria.   Musculoskeletal: Positive for arthralgias, back pain and myalgias.   Skin: Negative for rash.   Allergic/Immunologic: Positive for immunocompromised state.   Neurological: Negative for weakness.   Hematological: Does not bruise/bleed easily.   Psychiatric/Behavioral: Negative for confusion.         Physical Exam   BP: (!) 154/82  Pulse: 115  Temp: 97.6  F (36.4  C)  Resp: 16  SpO2: 94 %  Physical Exam  General: Afebrile, no acute distress   HEENT: Normocephalic, atraumatic, conjunctivae normal. MMM  Neck: non-tender, supple  Cardio: regular rate. regular rhythm   Resp: Normal work of breathing, no respiratory distress, lungs clear bilaterally, no wheezing, rhonchi, rales  Chest/Back: no visual signs of trauma, no CVA tenderness   Abdomen: soft, non distension, no tenderness, no peritoneal signs   Neuro: alert and fully oriented. CN II-XII grossly intact. Grossly  normal strength and sensation in all extremities.   MSK: no deformities. Normal range of motion  Integumentary/Skin: no rash visualized, normal color  Psych: normal affect, normal behavior    ED Course      Procedures     Results for orders placed or performed during the hospital encounter of 06/21/21   Comprehensive metabolic panel     Status: Abnormal   Result Value Ref Range    Sodium 139 133 - 144 mmol/L    Potassium 4.1 3.4 - 5.3 mmol/L    Chloride 111 (H) 94 - 109 mmol/L    Carbon Dioxide 24 20 - 32 mmol/L    Anion Gap 4 3 - 14 mmol/L    Glucose 100 (H) 70 - 99 mg/dL    Urea Nitrogen 8 7 - 30 mg/dL    Creatinine 0.50 (L) 0.52 - 1.04 mg/dL    GFR Estimate >90 >60 mL/min/[1.73_m2]    GFR Estimate If Black >90 >60 mL/min/[1.73_m2]    Calcium 8.5 8.5 - 10.1 mg/dL    Bilirubin Total 2.9 (H) 0.2 - 1.3 mg/dL    Albumin 3.8 3.4 - 5.0 g/dL    Protein Total 7.7 6.8 - 8.8 g/dL    Alkaline Phosphatase 72 40 - 150 U/L    ALT 80 (H) 0 - 50 U/L    AST 82 (H) 0 - 45 U/L   CBC with platelets differential     Status: Abnormal   Result Value Ref Range    WBC 13.1 (H) 4.0 - 11.0 10e9/L    RBC Count 2.63 (L) 3.8 - 5.2 10e12/L    Hemoglobin 8.2 (L) 11.7 - 15.7 g/dL    Hematocrit 23.6 (L) 35.0 - 47.0 %    MCV 90 78 - 100 fl    MCH 31.2 26.5 - 33.0 pg    MCHC 34.7 31.5 - 36.5 g/dL    RDW 22.3 (H) 10.0 - 15.0 %    Platelet Count 250 150 - 450 10e9/L    Diff Method Automated Method     % Neutrophils 50.0 %    % Lymphocytes 27.4 %    % Monocytes 8.6 %    % Eosinophils 11.4 %    % Basophils 2.2 %    % Immature Granulocytes 0.4 %    Nucleated RBCs 3 (H) 0 /100    Absolute Neutrophil 6.6 1.6 - 8.3 10e9/L    Absolute Lymphocytes 3.6 0.8 - 5.3 10e9/L    Absolute Monocytes 1.1 0.0 - 1.3 10e9/L    Absolute Eosinophils 1.5 (H) 0.0 - 0.7 10e9/L    Absolute Basophils 0.3 (H) 0.0 - 0.2 10e9/L    Abs Immature Granulocytes 0.1 0 - 0.4 10e9/L    Absolute Nucleated RBC 0.3    Lactate for Sepsis Protocol     Status: None   Result Value Ref Range     Lactate for Sepsis Protocol 0.9 0.7 - 2.0 mmol/L   Reticulocyte count     Status: Abnormal   Result Value Ref Range    % Retic 23.8 (H) 0.5 - 2.0 %    Absolute Retic 625.9 (H) 25 - 95 10e9/L     Medications   morphine (PF) injection 2 mg (2 mg Intravenous Given 6/21/21 0530)        Assessments & Plan (with Medical Decision Making)   Jennifer Cervantes is a 21 year old female who has a PMHx of sickle cell disease, PE on xarelto, prior CVA presenting with sickle cell pain, recently started on infusion with IV Crizanlizumab-tmca (ADAKVEO).  Upon arrival patient is well-appearing, afebrile, no distress.  Patient here with worsening sickle cell pain crises after recently being started on IV infusion.  Patient with no other complaints such as fever, chest pain, shortness of breath to suggest acute chest syndrome, ACS, pulmonary embolism, infection.  At this time will plan for comprehensive labs, will treat per pain management protocol, and reevaluate.    I reviewed comprehensive labs which are remarkable for mild leukocytosis with white blood cell count of 13.1 (decreaesd from 15.7 yesterday), hemoglobin 8.2, normal lactic acid 0.9, no acute metabolic electrolyte abnormality, mild transaminitis with ALT 80, AST 82, bilirubin 2.9 (patient with similar transaminitis in the past).     On reevaluation patient resting comfortably, patient feeling better after her 3 doses of pain medication per pain management protocol.  I did discuss with patient possible observation admission for pain control however patient feels as if she is comfortable to go home.  I strongly encouraged her to follow-up with her hematology/oncology team as she has had 2 emergency department visits since her infusion.  Discussed with patient to return to the emergency department if she has any worsening symptoms, worsening pain, high fever, chest pain, shortness of breath.  Patient understands and agrees with the plan.        I have reviewed the nursing notes. I  have reviewed the findings, diagnosis, plan and need for follow up with the patient.    New Prescriptions    No medications on file       Final diagnoses:   Sickle cell pain crisis (H)       --  Jamee Martin MD  [facility] EMERGENCY DEPARTMENT  6/21/2021     Jamee Martin MD  06/21/21 [id number]

## 2021-06-21 NOTE — ED TRIAGE NOTES
Pt reports pain all over her body starting this evening. Pt states that her hematologist recently started her on a new medication that has a side effect of increasing sickle cell pain. Pt does not recall the name of the medication. Pt rates pain 10/10.

## 2021-06-21 NOTE — PROGRESS NOTES
Sickle Cell Outpatient Follow Up Visit      Date of encounter: Jun 18, 2021    Jennifer Cervantes is a 21 year old female who is being seen in clinic for hospital follow up and health maintenance related to SCD      Interval History: Jennifer still has frequent pain. She has not been admitted however. She is here today for her first crizanlizumab (she had some pain with the infusion but was already coming in for pain med infusion as well). Pain is all in her typical locations. No new changes at home--she is still living with her family and she feels it's dysfunctional but she wants to save up money again. No issues with the Xarelto. She has not missed any doses. The chelation on the weekends is going well too.       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 She also has significant anxiety and depression with a strong anxiety about transferring to the adult clinic. She usually has pain crises secondary to the anxiety.      --------------------------------  Plan last reviewed with patient: 3/26/2021    Patient background: 20yo F, enjoys movies and kids though there are times where she does not really want to talk to people. Does not have a lot of social support at home.     Sickle Cell Disease History  Primary Hematologist: Perla  PCP: Raul  Genotype: SS  Acute Pain Crisis Treatment:    ER/Acute Care/Infusion Clinic:   o Morphine 2 mg IVP/SC Q1H X 3 doses  o Toradol x1 (avoid through July due to being on anticoagulation)  o Maintenance IV fluids with LR  o Other: Benadryl PO and Zofran 8 mg IV PRN itching or nausea    Inpatient:  o Opioid: Morphine 2 mg IV Q1H PRN until PCA  starts  o PCA plan:   - PCA button dose: Morphine 1 mg  - Lockout: 20 minutes  - Continuous Infusion: consider 1 mg/hr  - One hr max: 4 mg  o Other Medications: Benadryl, Zofran  o If PCA not working, she Has had success with ketamine starting at 4mg/h and advancing only to 6mg/h, as 8mg/h made her feel quite poorly.  o ASA on hold  o Supportive Care: Docusate, Senna  Chronic Pain Medications:    Home regimen Oxycontin 10 mg q12h, oxycodone 10 mg p.o. q.6 hours p.r.n. breakthrough pain.  She also continues on Celebrex, and Zoloft among other medications.    -Also benefits from mental health visits, acupuncture  Baseline Hemoglobin: 7 g/dl without chronic transfusions  Hydroxyurea use: Yes  H/O blood transfusions: Yes, several (iron overload) Most recent 8/21/20    H/O Transfusion Reactions: no    Antibodies:none  H/O Acute Chest Syndrome: Yes    Last episode: 10/2019     ICU/intubation: unsure  H/O Stroke: Yes (managed with chronic transfusions in the past)  H/O VTE: Yes (2/2021)  H/O Cholecystectomy or Splenectomy: no  H/O Asthma, Pulm HTN, AVN, Leg Ulcers, Nephropathy, Retinopathy, etc: Iron overload, asthma, chronic lung disease, mild developmental delay from early stroke    -------------------------------------------    Sickle Cell Disease Comprehensive Checklist    Bone Health/Avascular Necrosis Screening/Symptoms (each visit): no new concerns today    Leg Ulcer evaluation (every visit): none    Hypertension (every visit): mildly hypertensive recently but improved later in evening and previous day     Last urinalysis for microalbuminuria/CKD (annually): within last year    Last pulmonary evaluation (asthma, AMAN, pulm HTN): within last year    Stroke/silent cerebral infarct Hx (Y/N): Yes TIA ~2014, first event ~age 1 with full stroke and R sided weakness    Last PCP Visit: 8/2020    Vaccines:  o PCV13: 5/13/19  o Pneumovax (PPSV23): 3/04, 10/09, 7/12/19 (next due 7/2024)  o Menactra: 4/2010, 9/2015 (MCV overdue  Sept 2020)  o Influenza: got 20-21 vaccine per self report    Audiology (chelation): done 6/2020, normal    Past Medical History:  Past Medical History:   Diagnosis Date     Anxiety      Bleeding disorder (H)      Blood clotting disorder (H)      Cerebral infarction (H) 2015     Cognitive developmental delay     low IQ. 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 (H)     right hand contractures     Iron overload due to repeated red blood cell transfusions      Migraines      Multiple subsegmental pulmonary emboli without acute cor pulmonale (H) 02/01/2021     Oppositional defiant behavior      Superficial venous thrombosis of arm, right 03/25/2021     Uncomplicated asthma        Past Surgical History:  Past Surgical History:   Procedure Laterality Date     AS INSERT TUNNELED CV 2 CATH W/O PORT/PUMP       C BREAST REDUCTION (INCLUDES LIPO) TIER 3 Bilateral 04/23/2019     CHOLECYSTECTOMY       INSERT PORT VASCULAR ACCESS Left 4/21/2021    Procedure: INSERTION, VASCULAR ACCESS PORT (NOT SURE ON SIDE UNTIL REMOVAL);  Surgeon: Rajan More MD;  Location: [facility] OR     IR CHEST PORT PLACEMENT > 5 YRS OF AGE  4/21/2021     IR CVC NON TUNNEL LINE REMOVAL  5/6/2021     IR CVC NON TUNNEL PLACEMENT  04/07/2020     IR CVC NON TUNNEL PLACEMENT  4/30/2021     IR PORT REMOVAL LEFT  4/21/2021     REMOVE PORT VASCULAR ACCESS Left 4/21/2021    Procedure: REMOVAL, VASCULAR ACCESS PORT LEFT;  Surgeon: Rajan More MD;  Location: [facility] OR     REPAIR TENDON ELBOW Right 10/02/2019    Procedure: Right Elbow Flexor Lengthening, Flexor Pronator Slide Of Wrist and Finger, Thumb Adductor Lengthening;  Surgeon: Anai Franco MD;  Location: [facility] OR     TONSILLECTOMY Bilateral 10/02/2019    Procedure: Bilateral Tonsillectomy;  Surgeon: Farhana Guy MD;  Location: [facility] OR       Family History:  "  Family History   Problem Relation Age of Onset     Sickle Cell Trait Mother      Hypertension Mother      Asthma Mother      Sickle Cell Trait Father        Social History:  Social History     Socioeconomic History     Marital status: Single     Spouse name: Not on file     Number of children: Not on file     Years of education: Not on file     Highest education level: Not on file   Occupational History     Not on file   Social Needs     Financial resource strain: Not on file     Food insecurity     Worry: Not on file     Inability: Not on file     Transportation needs     Medical: Not on file     Non-medical: Not on file   Tobacco Use     Smoking status: Never Smoker     Smokeless tobacco: Never Used   Substance and Sexual Activity     Alcohol use: Not Currently     Alcohol/week: 0.0 standard drinks     Drug use: Never     Sexual activity: Not Currently     Partners: Male     Birth control/protection: Other   Lifestyle     Physical activity     Days per week: Not on file     Minutes per session: Not on file     Stress: Not on file   Relationships     Social connections     Talks on phone: Not on file     Gets together: Not on file     Attends Confucianism service: Not on file     Active member of club or organization: Not on file     Attends meetings of clubs or organizations: Not on file     Relationship status: Not on file     Intimate partner violence     Fear of current or ex partner: Not on file     Emotionally abused: Not on file     Physically abused: Not on file     Forced sexual activity: Not on file   Other Topics Concern     Parent/sibling w/ CABG, MI or angioplasty before 65F 55M? Not Asked   Social History Narrative    Lives with mom and extended family but \"toxic environment\" per her report. She would like to move out but cannot financially do so. She has minimal support at home despite her significant SCD comorbidities and cognitive delay from stroke.       Medications:  Current Outpatient Medications "   Medication     acetaminophen (TYLENOL) 325 MG tablet     albuterol (PROAIR HFA/PROVENTIL HFA/VENTOLIN HFA) 108 (90 Base) MCG/ACT inhaler     albuterol (PROVENTIL) (2.5 MG/3ML) 0.083% neb solution     ARIPiprazole (ABILIFY) 2 MG tablet     budesonide-formoterol (SYMBICORT) 160-4.5 MCG/ACT Inhaler     diclofenac (VOLTAREN) 1 % topical gel     diphenhydrAMINE (BENADRYL) 25 MG capsule     EPINEPHrine (ANY BX GENERIC EQUIV) 0.3 MG/0.3ML injection 2-pack     Hydroxyurea 1000 MG TABS     hydrOXYzine (ATARAX) 25 MG tablet     JADENU 360 MG tablet     lidocaine-prilocaine (EMLA) 2.5-2.5 % external cream     medroxyPROGESTERone (DEPO-PROVERA) 150 MG/ML IM injection     naloxone (NARCAN) 4 MG/0.1ML nasal spray     ondansetron (ZOFRAN) 8 MG tablet     oxyCODONE (OXYCONTIN) 10 MG 12 hr tablet     oxyCODONE IR (ROXICODONE) 15 MG tablet     rivaroxaban ANTICOAGULANT (XARELTO ANTICOAGULANT) 20 MG TABS tablet     sertraline (ZOLOFT) 100 MG tablet     Current Facility-Administered Medications   Medication     medroxyPROGESTERone (DEPO-PROVERA) syringe 150 mg         Physical Exam:   /74 (BP Location: Right arm, Patient Position: Sitting, Cuff Size: Adult Regular)   Pulse 101   Temp 98.5  F (36.9  C) (Tympanic)   Resp 16   Wt 75.7 kg (166 lb 12.8 oz)   SpO2 93%   BMI 28.63 kg/m       GEN: young  female, lying on hospital bed getting infusion  HEENT: no icterus, MMM, new hair color  CV: RRR, no murmurs  NECK: no visible asymmetry  RESP: speaking in full sentences, no increased work of breathing, clear to auscultation  SKIN: no bruising noted  MSK: contracture at right wrist (chronic), now with slight edema compared to previous visits but non painful to touch  NEURO: alert and oriented      Labs:   Results for HIMANSHU AL (MRN [id number]) as of 6/21/2021 10:29   Ref. Range 6/18/2021 11:20   Sodium Latest Ref Range: 133 - 144 mmol/L 140   Potassium Latest Ref Range: 3.4 - 5.3 mmol/L 3.8   Chloride Latest  Ref Range: 94 - 109 mmol/L 109   Carbon Dioxide Latest Ref Range: 20 - 32 mmol/L 22   Urea Nitrogen Latest Ref Range: 7 - 30 mg/dL 8   Creatinine Latest Ref Range: 0.52 - 1.04 mg/dL 0.51 (L)   GFR Estimate Latest Ref Range: >60 mL/min/1.73_m2 >90   GFR Estimate If Black Latest Ref Range: >60 mL/min/1.73_m2 >90   Calcium Latest Ref Range: 8.5 - 10.1 mg/dL 8.4 (L)   Anion Gap Latest Ref Range: 3 - 14 mmol/L 9   Ferritin Latest Ref Range: 12 - 150 ng/mL 8,923 (H)   Glucose Latest Ref Range: 70 - 99 mg/dL 97   WBC Latest Ref Range: 4.0 - 11.0 10e9/L 12.6 (H)   Hemoglobin Latest Ref Range: 11.7 - 15.7 g/dL 8.9 (L)   Hematocrit Latest Ref Range: 35.0 - 47.0 % 25.2 (L)   Platelet Count Latest Ref Range: 150 - 450 10e9/L 371   RBC Count Latest Ref Range: 3.8 - 5.2 10e12/L 2.87 (L)   MCV Latest Ref Range: 78 - 100 fl 88   MCH Latest Ref Range: 26.5 - 33.0 pg 31.0   MCHC Latest Ref Range: 31.5 - 36.5 g/dL 35.3   RDW Latest Ref Range: 10.0 - 15.0 % 22.5 (H)   Diff Method Unknown Manual Differential   % Neutrophils Latest Units: % 58.0   % Lymphocytes Latest Units: % 29.0   % Monocytes Latest Units: % 5.0   % Eosinophils Latest Units: % 7.0   % Basophils Latest Units: % 1.0   Nucleated RBCs Latest Ref Range: 0 /100 8 (H)   Absolute Neutrophil Latest Ref Range: 1.6 - 8.3 10e9/L 7.3   Absolute Lymphocytes Latest Ref Range: 0.8 - 5.3 10e9/L 3.7   Absolute Monocytes Latest Ref Range: 0.0 - 1.3 10e9/L 0.6   Absolute Eosinophils Latest Ref Range: 0.0 - 0.7 10e9/L 0.9 (H)   Absolute Basophils Latest Ref Range: 0.0 - 0.2 10e9/L 0.1   Absolute Nucleated RBC Unknown 1.0   Anisocytosis Unknown Marked   Poikilocytosis Unknown Marked   Polychromasia Unknown Slight   Sickle Cells Unknown Marked   Target Cells Unknown Slight   Platelet Estimate Unknown Confirming automated cell count   % Retic Latest Ref Range: 0.5 - 2.0 % 24.7 (H)   Absolute Retic Latest Ref Range: 25 - 95 10e9/L 710.2 (H)       Imaging:   None new    Ferriscan  3/25/21  Average liver iron concentration is 43 mg/g dry tissue (normal = 0.17 - 1.8)  Average liver iron concentration is 770 mmol/kg dry tissue (normal = 3 - 33)     Other findings: Diffuse hypodensity of  liver and spleen, consistent with secondary hemochromatosis.     ----------    Assessment:  Jennifer Cervantes is a 21 year old female with HbSS. Her disease has been complicated by stroke leading to significant cognitive delay and right sided hemiparesis, high anxiety leading to frequent pain crises on top of chronic pain syndrome, poor social structure at home with no real support, and iron overload secondary to repeated transfusions. She has had significant stressors at home and her 2021 has been marked by a long admission and separately, PE and SVT + DVT in RUE of unclear chronicity. She also has clear evidence of massive iron overload, which was suspected but is requiring urgent intervention.    Major Topics of the Visit:  1. Pain Management: No change to overall strategy and no refills needed today but she is setting a goal to be off Oxycontin by the end of July. Mental stress is also important here and we need to maintain a support structure and she can engage with her counselor. SW has engaged with her on this    -Crizanlizumab today. Mild pain with infusion so order has been modified to run over 60 minutes. Next infusion in 2 weeks, then monthly  2. Iron overload/Pharmacy Issues: She had Jadenu ordered a few months ago but there have been difficulties getting it at refill time. She is now on Desferal as well. LIC was 43 (3/2021) so we need to act urgently. Desferal HD over 48 hours is going well. Synthetic function for the liver seems to be intact  Repeat Ferriscan in July (ordered)  3. High RBC turnover: Jennifer really has a high degree of RBC turnover, more than most patients I have seen. Oxybryta led to more frequent pain episodes (chest pain, which was new) and did not change the RBC turnover so it was  stopped in December. No change currently  4. Pulmonary Emboli + new RUE DVT (innominate vein) of unclear chronicity + new symptomatic R cephalic SVT this week: Switched to apixaban with full starter pack given the new clots. No obvious trigger. I will treat the SVT primarily given the symptoms, and the timing comes out for 6 weeks to be around the time she would have finished the rivaroxaban. Treating through 7/27. Holding aspirin while on anticoagulation  5. Stroke sequelae on right side: I will reach out to PM&R to see if they may be able to better assist with her weakness. I do not think a surgical approach is warranted at this point. This visit has not yet occurred  6. Mental Health: Refilled arapiprazole and sertraline. Will try to get her re-initiated with Dr. Carter. Some of it is related to treatment in ED. I have now added documentation specifically mentioning the incontinence into the Care Coordination note.  Follow up: 2 weeks for Jr.      Review of external notes as documented above   Review of the result(s) of each unique test - labs as above  Diagnosis or treatment significantly limited by social determinants of health - sickle cell disease, racial inequity, difficult social situation at home  Ordering of specific tests    30 min spent on the date of the encounter in chart review, patient visit, review of tests, documentation and/or discussion with other providers about the issues documented above.       Eric Duncan MD  Hematologist  Division of Hematology, Oncology, and Transplantation  [facility]  [facility]  Pager: [phone]

## 2021-06-21 NOTE — DISCHARGE INSTRUCTIONS
Thank you for your patience today.  Please follow-up with your regular doctor/hematologist/oncologist in the next 2-3 days for further evaluation and follow-up care.  Please call to schedule an appointment.  Please continue your own medications.  Please return to the ER if you develop any worsening of your current symptoms.  It was a pleasure taking care of you today.  We hope you feel better soon.

## 2021-06-23 ENCOUNTER — HOSPITAL ENCOUNTER (EMERGENCY)
Facility: CLINIC | Age: 21
Discharge: HOME | End: 2021-06-23
Attending: EMERGENCY MEDICINE | Admitting: EMERGENCY MEDICINE
Payer: COMMERCIAL

## 2021-06-23 VITALS
TEMPERATURE: 98.9 F | RESPIRATION RATE: 20 BRPM | DIASTOLIC BLOOD PRESSURE: 91 MMHG | HEIGHT: 64 IN | WEIGHT: 168 LBS | OXYGEN SATURATION: 97 % | HEART RATE: 109 BPM | BODY MASS INDEX: 28.68 KG/M2 | SYSTOLIC BLOOD PRESSURE: 137 MMHG

## 2021-06-23 DIAGNOSIS — D57.00 SICKLE CELL PAIN CRISIS (H): ICD-10-CM

## 2021-06-23 LAB
ALBUMIN SERPL-MCNC: 4.1 G/DL (ref 3.4–5)
ALP SERPL-CCNC: 75 U/L (ref 40–150)
ALT SERPL W P-5'-P-CCNC: 86 U/L (ref 0–50)
ANION GAP SERPL CALCULATED.3IONS-SCNC: 8 MMOL/L (ref 3–14)
AST SERPL W P-5'-P-CCNC: 81 U/L (ref 0–45)
BASOPHILS # BLD AUTO: 0.3 10E9/L (ref 0–0.2)
BASOPHILS NFR BLD AUTO: 2 %
BILIRUB SERPL-MCNC: 3 MG/DL (ref 0.2–1.3)
BUN SERPL-MCNC: 9 MG/DL (ref 7–30)
CALCIUM SERPL-MCNC: 8.9 MG/DL (ref 8.5–10.1)
CHLORIDE SERPL-SCNC: 108 MMOL/L (ref 94–109)
CO2 SERPL-SCNC: 22 MMOL/L (ref 20–32)
CREAT SERPL-MCNC: 0.59 MG/DL (ref 0.52–1.04)
DIFFERENTIAL METHOD BLD: ABNORMAL
EOSINOPHIL # BLD AUTO: 1.3 10E9/L (ref 0–0.7)
EOSINOPHIL NFR BLD AUTO: 8.2 %
ERYTHROCYTE [DISTWIDTH] IN BLOOD BY AUTOMATED COUNT: 22 % (ref 10–15)
GFR SERPL CREATININE-BSD FRML MDRD: >90 ML/MIN/{1.73_M2}
GLUCOSE SERPL-MCNC: 91 MG/DL (ref 70–99)
HCT VFR BLD AUTO: 24.5 % (ref 35–47)
HGB BLD-MCNC: 8.7 G/DL (ref 11.7–15.7)
IMM GRANULOCYTES # BLD: 0.1 10E9/L (ref 0–0.4)
IMM GRANULOCYTES NFR BLD: 0.5 %
LACTATE BLD-SCNC: 0.8 MMOL/L (ref 0.7–2)
LYMPHOCYTES # BLD AUTO: 3.6 10E9/L (ref 0.8–5.3)
LYMPHOCYTES NFR BLD AUTO: 23 %
MCH RBC QN AUTO: 31.1 PG (ref 26.5–33)
MCHC RBC AUTO-ENTMCNC: 35.5 G/DL (ref 31.5–36.5)
MCV RBC AUTO: 88 FL (ref 78–100)
MONOCYTES # BLD AUTO: 1.1 10E9/L (ref 0–1.3)
MONOCYTES NFR BLD AUTO: 7.1 %
NEUTROPHILS # BLD AUTO: 9.2 10E9/L (ref 1.6–8.3)
NEUTROPHILS NFR BLD AUTO: 59.2 %
NRBC # BLD AUTO: 0.4 10*3/UL
NRBC BLD AUTO-RTO: 2 /100
PLATELET # BLD AUTO: 312 10E9/L (ref 150–450)
POTASSIUM SERPL-SCNC: 3.9 MMOL/L (ref 3.4–5.3)
PROT SERPL-MCNC: 8.4 G/DL (ref 6.8–8.8)
RBC # BLD AUTO: 2.8 10E12/L (ref 3.8–5.2)
RETICS # AUTO: 597.3 10E9/L (ref 25–95)
RETICS/RBC NFR AUTO: 21 % (ref 0.5–2)
SODIUM SERPL-SCNC: 138 MMOL/L (ref 133–144)
WBC # BLD AUTO: 15.6 10E9/L (ref 4–11)

## 2021-06-23 PROCEDURE — 96374 THER/PROPH/DIAG INJ IV PUSH: CPT | Performed by: EMERGENCY MEDICINE

## 2021-06-23 PROCEDURE — 85045 AUTOMATED RETICULOCYTE COUNT: CPT | Performed by: EMERGENCY MEDICINE

## 2021-06-23 PROCEDURE — 250N000013 HC RX MED GY IP 250 OP 250 PS 637: Performed by: EMERGENCY MEDICINE

## 2021-06-23 PROCEDURE — 99285 EMERGENCY DEPT VISIT HI MDM: CPT | Performed by: EMERGENCY MEDICINE

## 2021-06-23 PROCEDURE — 96375 TX/PRO/DX INJ NEW DRUG ADDON: CPT | Performed by: EMERGENCY MEDICINE

## 2021-06-23 PROCEDURE — 80053 COMPREHEN METABOLIC PANEL: CPT | Performed by: EMERGENCY MEDICINE

## 2021-06-23 PROCEDURE — 99285 EMERGENCY DEPT VISIT HI MDM: CPT | Mod: 25 | Performed by: EMERGENCY MEDICINE

## 2021-06-23 PROCEDURE — 83605 ASSAY OF LACTIC ACID: CPT | Performed by: EMERGENCY MEDICINE

## 2021-06-23 PROCEDURE — 258N000003 HC RX IP 258 OP 636: Performed by: EMERGENCY MEDICINE

## 2021-06-23 PROCEDURE — 85025 COMPLETE CBC W/AUTO DIFF WBC: CPT | Performed by: EMERGENCY MEDICINE

## 2021-06-23 PROCEDURE — 250N000011 HC RX IP 250 OP 636: Performed by: EMERGENCY MEDICINE

## 2021-06-23 PROCEDURE — 96361 HYDRATE IV INFUSION ADD-ON: CPT | Performed by: EMERGENCY MEDICINE

## 2021-06-23 PROCEDURE — 96376 TX/PRO/DX INJ SAME DRUG ADON: CPT | Performed by: EMERGENCY MEDICINE

## 2021-06-23 RX ORDER — MORPHINE SULFATE 2 MG/ML
2 INJECTION, SOLUTION INTRAMUSCULAR; INTRAVENOUS
Status: COMPLETED | OUTPATIENT
Start: 2021-06-23 | End: 2021-06-23

## 2021-06-23 RX ORDER — DIPHENHYDRAMINE HCL 25 MG
25 CAPSULE ORAL ONCE
Status: COMPLETED | OUTPATIENT
Start: 2021-06-23 | End: 2021-06-23

## 2021-06-23 RX ORDER — HEPARIN SODIUM (PORCINE) LOCK FLUSH IV SOLN 100 UNIT/ML 100 UNIT/ML
5 SOLUTION INTRAVENOUS
Status: DISCONTINUED | OUTPATIENT
Start: 2021-06-23 | End: 2021-06-23 | Stop reason: HOSPADM

## 2021-06-23 RX ORDER — SODIUM CHLORIDE, SODIUM LACTATE, POTASSIUM CHLORIDE, CALCIUM CHLORIDE 600; 310; 30; 20 MG/100ML; MG/100ML; MG/100ML; MG/100ML
INJECTION, SOLUTION INTRAVENOUS CONTINUOUS
Status: DISCONTINUED | OUTPATIENT
Start: 2021-06-23 | End: 2021-06-23 | Stop reason: HOSPADM

## 2021-06-23 RX ORDER — ONDANSETRON 2 MG/ML
4 INJECTION INTRAMUSCULAR; INTRAVENOUS EVERY 30 MIN PRN
Status: DISCONTINUED | OUTPATIENT
Start: 2021-06-23 | End: 2021-06-23 | Stop reason: HOSPADM

## 2021-06-23 RX ADMIN — SODIUM CHLORIDE, POTASSIUM CHLORIDE, SODIUM LACTATE AND CALCIUM CHLORIDE: 600; 310; 30; 20 INJECTION, SOLUTION INTRAVENOUS at 02:30

## 2021-06-23 RX ADMIN — MORPHINE SULFATE 2 MG: 2 INJECTION, SOLUTION INTRAMUSCULAR; INTRAVENOUS at 04:28

## 2021-06-23 RX ADMIN — ONDANSETRON 4 MG: 2 INJECTION INTRAMUSCULAR; INTRAVENOUS at 04:28

## 2021-06-23 RX ADMIN — MORPHINE SULFATE 2 MG: 2 INJECTION, SOLUTION INTRAMUSCULAR; INTRAVENOUS at 02:31

## 2021-06-23 RX ADMIN — MORPHINE SULFATE 2 MG: 2 INJECTION, SOLUTION INTRAMUSCULAR; INTRAVENOUS at 03:22

## 2021-06-23 RX ADMIN — Medication 5 ML: at 04:37

## 2021-06-23 RX ADMIN — ONDANSETRON 4 MG: 2 INJECTION INTRAMUSCULAR; INTRAVENOUS at 02:31

## 2021-06-23 RX ADMIN — DIPHENHYDRAMINE HYDROCHLORIDE 25 MG: 25 CAPSULE ORAL at 02:31

## 2021-06-23 ASSESSMENT — ENCOUNTER SYMPTOMS
SHORTNESS OF BREATH: 0
ABDOMINAL PAIN: 0
MYALGIAS: 1
FEVER: 0

## 2021-06-23 ASSESSMENT — MIFFLIN-ST. JEOR: SCORE: 1507.04

## 2021-06-23 NOTE — DISCHARGE INSTRUCTIONS
Follow-up with your primary care provider.  Return to the emergency department as needed for any new or worsening symptoms.\

## 2021-06-23 NOTE — ED TRIAGE NOTES
"Patient reports sickle cell pain crisis. Patient was started on new medication from hematologists, \"douglas\". Patient reports more pain with medication. Appointment in next few weeks to change dose.   "

## 2021-06-23 NOTE — ED PROVIDER NOTES
ED Provider Note  [facility]      History     Chief Complaint   Patient presents with     Sickle Cell Pain Crisis     HPI  Jennifer Cervantes is a 21 year old female with PMH notable for sickle cell disease, PE on xarelto, prior CVA presenting with sickle cell pain.  She states this is her normal pain.  She describes it as generalized mostly in her joints.  No fevers or chills.  No nausea or vomiting.  No cough or shortness of breath.    Per chart review patient has been seen here in the ED 6/20 and 6/21 for sickle cell pain. She was recently started on infusion with IV Crizanlizumab-tmca (ADAKVEO) 6/18 and has since reported worsening pain. On 6/21 labs which were remarkable for mild leukocytosis with white blood cell count of 13.1 (decreased from 15.7 6/20), hemoglobin 8.2, normal lactic acid 0.9, no acute metabolic electrolyte abnormality, mild transaminitis with ALT 80, AST 82, bilirubin 2.9 (patient with similar transaminitis in the past). During both visits patient's pain improved after 3 doses of morphine per her pain management protocol. She was offered observation admission on 6/21 for pain control, but chose to be discharged home. She was advised to follow-up with her hematology/oncology team both times.     Past Medical History  Past Medical History:   Diagnosis Date     Anxiety      Bleeding disorder (H)      Blood clotting disorder (H)      Cerebral infarction (H) 2015     Cognitive developmental delay     low IQ. 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 (H)     right hand contractures     Iron overload due to repeated red blood cell transfusions      Migraines      Multiple subsegmental pulmonary emboli without acute cor pulmonale (H) 02/01/2021     Oppositional defiant behavior      Superficial venous thrombosis of arm, right 03/25/2021      Uncomplicated asthma      Past Surgical History:   Procedure Laterality Date     AS INSERT TUNNELED CV 2 CATH W/O PORT/PUMP       C BREAST REDUCTION (INCLUDES LIPO) TIER 3 Bilateral 04/23/2019     CHOLECYSTECTOMY       INSERT PORT VASCULAR ACCESS Left 4/21/2021    Procedure: INSERTION, VASCULAR ACCESS PORT (NOT SURE ON SIDE UNTIL REMOVAL);  Surgeon: Rajan More MD;  Location: [facility] OR     IR CHEST PORT PLACEMENT > 5 YRS OF AGE  4/21/2021     IR CVC NON TUNNEL LINE REMOVAL  5/6/2021     IR CVC NON TUNNEL PLACEMENT  04/07/2020     IR CVC NON TUNNEL PLACEMENT  4/30/2021     IR PORT REMOVAL LEFT  4/21/2021     REMOVE PORT VASCULAR ACCESS Left 4/21/2021    Procedure: REMOVAL, VASCULAR ACCESS PORT LEFT;  Surgeon: Rajan More MD;  Location: [facility] OR     REPAIR TENDON ELBOW Right 10/02/2019    Procedure: Right Elbow Flexor Lengthening, Flexor Pronator Slide Of Wrist and Finger, Thumb Adductor Lengthening;  Surgeon: Anai Franco MD;  [facility]     TONSILLECTOMY Bilateral 10/02/2019    Procedure: Bilateral Tonsillectomy;  Surgeon: Farhana Guy MD;  [facility]     acetaminophen (TYLENOL) 325 MG tablet  albuterol (PROAIR HFA/PROVENTIL HFA/VENTOLIN HFA) 108 (90 Base) MCG/ACT inhaler  albuterol (PROVENTIL) (2.5 MG/3ML) 0.083% neb solution  ARIPiprazole (ABILIFY) 2 MG tablet  budesonide-formoterol (SYMBICORT) 160-4.5 MCG/ACT Inhaler  diclofenac (VOLTAREN) 1 % topical gel  diphenhydrAMINE (BENADRYL) 25 MG capsule  EPINEPHrine (ANY BX GENERIC EQUIV) 0.3 MG/0.3ML injection 2-pack  Hydroxyurea 1000 MG TABS  hydrOXYzine (ATARAX) 25 MG tablet  JADENU 360 MG tablet  lidocaine-prilocaine (EMLA) 2.5-2.5 % external cream  medroxyPROGESTERone (DEPO-PROVERA) 150 MG/ML IM injection  naloxone (NARCAN) 4 MG/0.1ML nasal spray  ondansetron (ZOFRAN) 8 MG tablet  oxyCODONE (OXYCONTIN) 10 MG 12 hr tablet  oxyCODONE IR (ROXICODONE) 15 MG tablet  rivaroxaban ANTICOAGULANT (XARELTO ANTICOAGULANT) 20 MG TABS  "tablet  sertraline (ZOLOFT) 100 MG tablet      Allergies   Allergen Reactions     Contrast Dye      Hives and breathing issues     Fish-Derived Products Hives     Seafood Hives     Diagnostic X-Ray Materials      Gadolinium      Family History  Family History   Problem Relation Age of Onset     Sickle Cell Trait Mother      Hypertension Mother      Asthma Mother      Sickle Cell Trait Father      Social History   Social History     Tobacco Use     Smoking status: Never Smoker     Smokeless tobacco: Never Used   Substance Use Topics     Alcohol use: Not Currently     Alcohol/week: 0.0 standard drinks     Drug use: Never      Past medical history, past surgical history, medications, allergies, family history, and social history were reviewed with the patient. No additional pertinent items.       Review of Systems   Constitutional: Negative for fever.   Respiratory: Negative for shortness of breath.    Cardiovascular: Negative for chest pain.   Gastrointestinal: Negative for abdominal pain.   Musculoskeletal: Positive for myalgias.   All other systems reviewed and are negative.    A complete review of systems was performed with pertinent positives and negatives noted in the HPI, and all other systems negative.    Physical Exam   BP: 130/76  Pulse: 114  Temp: 98.8  F (37.1  C)  Resp: 16  Height: 162.6 cm (5' 4\")  Weight: 76.2 kg (168 lb)  SpO2: 91 %  Physical Exam  Vitals signs and nursing note reviewed.   Constitutional:       General: She is not in acute distress.     Appearance: She is well-developed.   HENT:      Head: Normocephalic.   Eyes:      Extraocular Movements: Extraocular movements intact.   Neck:      Musculoskeletal: Neck supple.   Cardiovascular:      Pulses: Normal pulses.      Heart sounds: Normal heart sounds.   Pulmonary:      Effort: No respiratory distress.      Breath sounds: Normal breath sounds.   Abdominal:      General: There is no distension.   Musculoskeletal:         General: No deformity. "   Skin:     General: Skin is dry.   Neurological:      Mental Status: She is alert.      Comments: alert   Psychiatric:         Behavior: Behavior normal.       ED Course      Procedures        Results for orders placed or performed during the hospital encounter of 06/23/21   Lactic acid     Status: None   Result Value Ref Range    Lactic Acid 0.8 0.7 - 2.0 mmol/L   CBC with platelets differential     Status: Abnormal   Result Value Ref Range    WBC 15.6 (H) 4.0 - 11.0 10e9/L    RBC Count 2.80 (L) 3.8 - 5.2 10e12/L    Hemoglobin 8.7 (L) 11.7 - 15.7 g/dL    Hematocrit 24.5 (L) 35.0 - 47.0 %    MCV 88 78 - 100 fl    MCH 31.1 26.5 - 33.0 pg    MCHC 35.5 31.5 - 36.5 g/dL    RDW 22.0 (H) 10.0 - 15.0 %    Platelet Count 312 150 - 450 10e9/L    Diff Method Automated Method     % Neutrophils 59.2 %    % Lymphocytes 23.0 %    % Monocytes 7.1 %    % Eosinophils 8.2 %    % Basophils 2.0 %    % Immature Granulocytes 0.5 %    Nucleated RBCs 2 (H) 0 /100    Absolute Neutrophil 9.2 (H) 1.6 - 8.3 10e9/L    Absolute Lymphocytes 3.6 0.8 - 5.3 10e9/L    Absolute Monocytes 1.1 0.0 - 1.3 10e9/L    Absolute Eosinophils 1.3 (H) 0.0 - 0.7 10e9/L    Absolute Basophils 0.3 (H) 0.0 - 0.2 10e9/L    Abs Immature Granulocytes 0.1 0 - 0.4 10e9/L    Absolute Nucleated RBC 0.4    Comprehensive metabolic panel     Status: Abnormal   Result Value Ref Range    Sodium 138 133 - 144 mmol/L    Potassium 3.9 3.4 - 5.3 mmol/L    Chloride 108 94 - 109 mmol/L    Carbon Dioxide 22 20 - 32 mmol/L    Anion Gap 8 3 - 14 mmol/L    Glucose 91 70 - 99 mg/dL    Urea Nitrogen 9 7 - 30 mg/dL    Creatinine 0.59 0.52 - 1.04 mg/dL    GFR Estimate >90 >60 mL/min/[1.73_m2]    GFR Estimate If Black >90 >60 mL/min/[1.73_m2]    Calcium 8.9 8.5 - 10.1 mg/dL    Bilirubin Total 3.0 (H) 0.2 - 1.3 mg/dL    Albumin 4.1 3.4 - 5.0 g/dL    Protein Total 8.4 6.8 - 8.8 g/dL    Alkaline Phosphatase 75 40 - 150 U/L    ALT 86 (H) 0 - 50 U/L    AST 81 (H) 0 - 45 U/L   Reticulocyte count      Status: Abnormal   Result Value Ref Range    % Retic 21.0 (H) 0.5 - 2.0 %    Absolute Retic 597.3 (H) 25 - 95 10e9/L          Results for orders placed or performed during the hospital encounter of 06/23/21   Lactic acid     Status: None   Result Value Ref Range    Lactic Acid 0.8 0.7 - 2.0 mmol/L   CBC with platelets differential     Status: Abnormal   Result Value Ref Range    WBC 15.6 (H) 4.0 - 11.0 10e9/L    RBC Count 2.80 (L) 3.8 - 5.2 10e12/L    Hemoglobin 8.7 (L) 11.7 - 15.7 g/dL    Hematocrit 24.5 (L) 35.0 - 47.0 %    MCV 88 78 - 100 fl    MCH 31.1 26.5 - 33.0 pg    MCHC 35.5 31.5 - 36.5 g/dL    RDW 22.0 (H) 10.0 - 15.0 %    Platelet Count 312 150 - 450 10e9/L    Diff Method Automated Method     % Neutrophils 59.2 %    % Lymphocytes 23.0 %    % Monocytes 7.1 %    % Eosinophils 8.2 %    % Basophils 2.0 %    % Immature Granulocytes 0.5 %    Nucleated RBCs 2 (H) 0 /100    Absolute Neutrophil 9.2 (H) 1.6 - 8.3 10e9/L    Absolute Lymphocytes 3.6 0.8 - 5.3 10e9/L    Absolute Monocytes 1.1 0.0 - 1.3 10e9/L    Absolute Eosinophils 1.3 (H) 0.0 - 0.7 10e9/L    Absolute Basophils 0.3 (H) 0.0 - 0.2 10e9/L    Abs Immature Granulocytes 0.1 0 - 0.4 10e9/L    Absolute Nucleated RBC 0.4    Comprehensive metabolic panel     Status: Abnormal   Result Value Ref Range    Sodium 138 133 - 144 mmol/L    Potassium 3.9 3.4 - 5.3 mmol/L    Chloride 108 94 - 109 mmol/L    Carbon Dioxide 22 20 - 32 mmol/L    Anion Gap 8 3 - 14 mmol/L    Glucose 91 70 - 99 mg/dL    Urea Nitrogen 9 7 - 30 mg/dL    Creatinine 0.59 0.52 - 1.04 mg/dL    GFR Estimate >90 >60 mL/min/[1.73_m2]    GFR Estimate If Black >90 >60 mL/min/[1.73_m2]    Calcium 8.9 8.5 - 10.1 mg/dL    Bilirubin Total 3.0 (H) 0.2 - 1.3 mg/dL    Albumin 4.1 3.4 - 5.0 g/dL    Protein Total 8.4 6.8 - 8.8 g/dL    Alkaline Phosphatase 75 40 - 150 U/L    ALT 86 (H) 0 - 50 U/L    AST 81 (H) 0 - 45 U/L   Reticulocyte count     Status: Abnormal   Result Value Ref Range    % Retic 21.0 (H) 0.5 -  2.0 %    Absolute Retic 597.3 (H) 25 - 95 10e9/L     Medications   lactated ringers infusion ( Intravenous New Bag 6/23/21 0230)   morphine (PF) injection 2 mg (2 mg Intravenous Given 6/23/21 0322)   ondansetron (ZOFRAN) injection 4 mg (4 mg Intravenous Given 6/23/21 0231)   heparin 100 UNIT/ML injection 5 mL (has no administration in time range)   diphenhydrAMINE (BENADRYL) capsule 25 mg (25 mg Oral Given 6/23/21 0231)        Assessments & Plan (with Medical Decision Making)   21-year-old female presents to us with a chief complaint of sickle cell pain.  She states this is similar to her previous pain crises.  She has mildly tachycardic but vital signs are otherwise unremarkable.  She denies any symptoms consistent with an infectious process.  He was given and symptomatic relief according to her normal protocol.  Symptoms improved.  Her labs are otherwise at her baseline.  She is comfortable discharged home at this time.    I have reviewed the nursing notes. I have reviewed the findings, diagnosis, plan and need for follow up with the patient.    New Prescriptions    No medications on file       Final diagnoses:   Sickle cell pain crisis (H)       --    [facility] EMERGENCY DEPARTMENT  6/23/2021     Mykel Luz,   06/23/21 1328

## 2021-06-24 ENCOUNTER — NURSE TRIAGE (OUTPATIENT)
Dept: ONCOLOGY | Facility: CLINIC | Age: 21
End: 2021-06-24

## 2021-06-24 NOTE — TELEPHONE ENCOUNTER
"[facility] Triage    Jennifer calls in to request to come in for IV fluids and pain meds. She states she is having her \"usual sickle cell pain.\"    Pain has been ongoing.  Pain is in her sides and low back.  Took Oxycontin, oxycodone, Tylenol at 5 am with no relief.  Pain rated 8/10.    Denies SOB, chest pain, fevers, chills.    The patient reports she was in the ER last night for this same pain. She was hydrated and given pain meds and discharged.    Dr. Duncan paged.   Andrei Machado paged.    Dr. Duncan approved for infusion.      "

## 2021-06-24 NOTE — TELEPHONE ENCOUNTER
Jennifer called, concerned that called early this morning to be placed on infusion appt list but did not get a call back with advisement or status update and felt wasted time when could have gone to ER.     This writer apologized, updated Jennifer no openings left for today and limited with openings today in general. Likely reason no call back was related to limited availability in infusion and waiting response from provider for approval.       Encourage pt to call back tomorrow morning and if needed go to ER and will communicate to Triage team importance of calling pt back earlier in afternoon with update.

## 2021-06-24 NOTE — PROGRESS NOTES
This is a recent snapshot of the patient's [facility] medical record.  For current drug dose and complete information and questions, call [phone] or In Basket pool, [facility]v home infusion ([id number])  CSN Number:  [id number]

## 2021-06-25 ENCOUNTER — HOME INFUSION (PRE-WILLOW HOME INFUSION) (OUTPATIENT)
Dept: PHARMACY | Facility: CLINIC | Age: 21
End: 2021-06-25

## 2021-06-25 ENCOUNTER — HOSPITAL ENCOUNTER (EMERGENCY)
Facility: CLINIC | Age: 21
Discharge: HOME | End: 2021-06-25
Attending: EMERGENCY MEDICINE | Admitting: EMERGENCY MEDICINE
Payer: COMMERCIAL

## 2021-06-25 VITALS
DIASTOLIC BLOOD PRESSURE: 78 MMHG | HEART RATE: 102 BPM | SYSTOLIC BLOOD PRESSURE: 130 MMHG | RESPIRATION RATE: 18 BRPM | TEMPERATURE: 98.3 F | OXYGEN SATURATION: 100 %

## 2021-06-25 DIAGNOSIS — D57.00 SICKLE CELL PAIN CRISIS (H): ICD-10-CM

## 2021-06-25 LAB
ALBUMIN SERPL-MCNC: 4.3 G/DL (ref 3.4–5)
ALP SERPL-CCNC: 75 U/L (ref 40–150)
ALT SERPL W P-5'-P-CCNC: 85 U/L (ref 0–50)
ANION GAP SERPL CALCULATED.3IONS-SCNC: 7 MMOL/L (ref 3–14)
AST SERPL W P-5'-P-CCNC: 81 U/L (ref 0–45)
BASOPHILS # BLD AUTO: 0.4 10E9/L (ref 0–0.2)
BASOPHILS NFR BLD AUTO: 2.1 %
BILIRUB SERPL-MCNC: 2.9 MG/DL (ref 0.2–1.3)
BUN SERPL-MCNC: 10 MG/DL (ref 7–30)
CALCIUM SERPL-MCNC: 9 MG/DL (ref 8.5–10.1)
CHLORIDE SERPL-SCNC: 108 MMOL/L (ref 94–109)
CO2 SERPL-SCNC: 20 MMOL/L (ref 20–32)
CREAT SERPL-MCNC: 0.72 MG/DL (ref 0.52–1.04)
DIFFERENTIAL METHOD BLD: ABNORMAL
EOSINOPHIL # BLD AUTO: 1.6 10E9/L (ref 0–0.7)
EOSINOPHIL NFR BLD AUTO: 8.5 %
ERYTHROCYTE [DISTWIDTH] IN BLOOD BY AUTOMATED COUNT: 22.5 % (ref 10–15)
GFR SERPL CREATININE-BSD FRML MDRD: >90 ML/MIN/{1.73_M2}
GLUCOSE SERPL-MCNC: 89 MG/DL (ref 70–99)
HCG SERPL QL: NEGATIVE
HCT VFR BLD AUTO: 24.2 % (ref 35–47)
HGB BLD-MCNC: 8.4 G/DL (ref 11.7–15.7)
IMM GRANULOCYTES # BLD: 0.1 10E9/L (ref 0–0.4)
IMM GRANULOCYTES NFR BLD: 0.6 %
LYMPHOCYTES # BLD AUTO: 4.1 10E9/L (ref 0.8–5.3)
LYMPHOCYTES NFR BLD AUTO: 22.4 %
MCH RBC QN AUTO: 30.9 PG (ref 26.5–33)
MCHC RBC AUTO-ENTMCNC: 34.7 G/DL (ref 31.5–36.5)
MCV RBC AUTO: 89 FL (ref 78–100)
MONOCYTES # BLD AUTO: 1.3 10E9/L (ref 0–1.3)
MONOCYTES NFR BLD AUTO: 6.8 %
NEUTROPHILS # BLD AUTO: 11 10E9/L (ref 1.6–8.3)
NEUTROPHILS NFR BLD AUTO: 59.6 %
NRBC # BLD AUTO: 0.6 10*3/UL
NRBC BLD AUTO-RTO: 3 /100
PLATELET # BLD AUTO: 279 10E9/L (ref 150–450)
POTASSIUM SERPL-SCNC: 3.9 MMOL/L (ref 3.4–5.3)
PROT SERPL-MCNC: 8.3 G/DL (ref 6.8–8.8)
RBC # BLD AUTO: 2.72 10E12/L (ref 3.8–5.2)
RETICS # AUTO: 695.9 10E9/L (ref 25–95)
RETICS/RBC NFR AUTO: 25.5 % (ref 0.5–2)
SODIUM SERPL-SCNC: 136 MMOL/L (ref 133–144)
WBC # BLD AUTO: 18.2 10E9/L (ref 4–11)

## 2021-06-25 PROCEDURE — 96376 TX/PRO/DX INJ SAME DRUG ADON: CPT | Performed by: EMERGENCY MEDICINE

## 2021-06-25 PROCEDURE — 250N000011 HC RX IP 250 OP 636: Performed by: EMERGENCY MEDICINE

## 2021-06-25 PROCEDURE — 99285 EMERGENCY DEPT VISIT HI MDM: CPT | Mod: 25 | Performed by: EMERGENCY MEDICINE

## 2021-06-25 PROCEDURE — 258N000003 HC RX IP 258 OP 636: Performed by: EMERGENCY MEDICINE

## 2021-06-25 PROCEDURE — 96361 HYDRATE IV INFUSION ADD-ON: CPT | Performed by: EMERGENCY MEDICINE

## 2021-06-25 PROCEDURE — 84703 CHORIONIC GONADOTROPIN ASSAY: CPT | Performed by: EMERGENCY MEDICINE

## 2021-06-25 PROCEDURE — 85004 AUTOMATED DIFF WBC COUNT: CPT | Performed by: EMERGENCY MEDICINE

## 2021-06-25 PROCEDURE — 85045 AUTOMATED RETICULOCYTE COUNT: CPT | Performed by: EMERGENCY MEDICINE

## 2021-06-25 PROCEDURE — 80053 COMPREHEN METABOLIC PANEL: CPT | Performed by: EMERGENCY MEDICINE

## 2021-06-25 PROCEDURE — 99285 EMERGENCY DEPT VISIT HI MDM: CPT | Performed by: EMERGENCY MEDICINE

## 2021-06-25 PROCEDURE — 85027 COMPLETE CBC AUTOMATED: CPT | Performed by: EMERGENCY MEDICINE

## 2021-06-25 PROCEDURE — 96374 THER/PROPH/DIAG INJ IV PUSH: CPT | Performed by: EMERGENCY MEDICINE

## 2021-06-25 RX ORDER — SODIUM CHLORIDE, SODIUM LACTATE, POTASSIUM CHLORIDE, CALCIUM CHLORIDE 600; 310; 30; 20 MG/100ML; MG/100ML; MG/100ML; MG/100ML
INJECTION, SOLUTION INTRAVENOUS CONTINUOUS
Status: DISCONTINUED | OUTPATIENT
Start: 2021-06-25 | End: 2021-06-25 | Stop reason: HOSPADM

## 2021-06-25 RX ORDER — MORPHINE SULFATE 2 MG/ML
2 INJECTION, SOLUTION INTRAMUSCULAR; INTRAVENOUS
Status: COMPLETED | OUTPATIENT
Start: 2021-06-25 | End: 2021-06-25

## 2021-06-25 RX ORDER — HEPARIN SODIUM (PORCINE) LOCK FLUSH IV SOLN 100 UNIT/ML 100 UNIT/ML
5 SOLUTION INTRAVENOUS
Status: DISCONTINUED | OUTPATIENT
Start: 2021-06-25 | End: 2021-06-25 | Stop reason: HOSPADM

## 2021-06-25 RX ADMIN — MORPHINE SULFATE 2 MG: 2 INJECTION, SOLUTION INTRAMUSCULAR; INTRAVENOUS at 07:27

## 2021-06-25 RX ADMIN — MORPHINE SULFATE 2 MG: 2 INJECTION, SOLUTION INTRAMUSCULAR; INTRAVENOUS at 04:30

## 2021-06-25 RX ADMIN — Medication 5 ML: at 07:46

## 2021-06-25 RX ADMIN — SODIUM CHLORIDE, POTASSIUM CHLORIDE, SODIUM LACTATE AND CALCIUM CHLORIDE: 600; 310; 30; 20 INJECTION, SOLUTION INTRAVENOUS at 02:38

## 2021-06-25 RX ADMIN — MORPHINE SULFATE 2 MG: 2 INJECTION, SOLUTION INTRAMUSCULAR; INTRAVENOUS at 02:36

## 2021-06-25 NOTE — ED PROVIDER NOTES
ED Provider Note  [facility]      History   No chief complaint on file.    The history is provided by the patient and medical records.     Jennifer Cervantes is a 21 year old female with a PMH of sickle cell anemia, sickle cell pain crisis, chronic pain w/ frequent ED visits, cerebral infarction w/ residual deficits, DVT, PE, anticoagulated on Xarelto and emergency care plan in place who presents to the ED today reporting diffuse low back and bilateral LE discomfort, consistent w/ previous sickle cell pain crisis.     Patient was recently seen here in the ED on 6/23/2021 complaining of sickle cell pain crisis. She was mildly tachycardic but her labs were otherwise baseline. She was provided with symptomatic relief according to her emergency care plan, and was able to be discharged. Has a history of frequent ED visits with similar symptoms. Most recent ED visits on 6/23, 6/21, 6/20, 6/13, 6/11, 6/11, 6/9, 6/8, 6/4, 6/3, 6/2, 6/1  (13 ED visits so far this month).     Patient presents today with symptoms she describes as being the same as her usual pain (same locations as usual (bilateral Legs/hips, diffuse low back), same types of pain, just thinks is more severe after a recent medication change (she is not sure which medication was changed). No traumas or falls. No fevers, diaphoresis, or chills. No focal pain. No joint swelling. No new numbness, tingling or weakness. No N/V. No HEENT symptoms. No CP or breathing symptoms. No GI/ symptoms. No incontinence. No other symptoms or complaints at this time. Please see ROS for further details. Patient confirms no new features or concerns to her presentation today, just looking for additional pain relief.     Past Medical History  Past Medical History:   Diagnosis Date     Anxiety      Bleeding disorder (H)      Blood clotting disorder (H)      Cerebral infarction (H) 2015     Cognitive developmental delay     low IQ. Please recognize when managing  pain and planning with her     Depressive disorder      Hemiplegia and hemiparesis following cerebral infarction affecting right dominant side (H)     right hand contractures     Iron overload due to repeated red blood cell transfusions      Migraines      Multiple subsegmental pulmonary emboli without acute cor pulmonale (H) 02/01/2021     Oppositional defiant behavior      Superficial venous thrombosis of arm, right 03/25/2021     Uncomplicated asthma      Past Surgical History:   Procedure Laterality Date     AS INSERT TUNNELED CV 2 CATH W/O PORT/PUMP       C BREAST REDUCTION (INCLUDES LIPO) TIER 3 Bilateral 04/23/2019     CHOLECYSTECTOMY       INSERT PORT VASCULAR ACCESS Left 4/21/2021    Procedure: INSERTION, VASCULAR ACCESS PORT (NOT SURE ON SIDE UNTIL REMOVAL);  Surgeon: Rajan More MD;  Location: [facility] OR     IR CHEST PORT PLACEMENT > 5 YRS OF AGE  4/21/2021     IR CVC NON TUNNEL LINE REMOVAL  5/6/2021     IR CVC NON TUNNEL PLACEMENT  04/07/2020     IR CVC NON TUNNEL PLACEMENT  4/30/2021     IR PORT REMOVAL LEFT  4/21/2021     REMOVE PORT VASCULAR ACCESS Left 4/21/2021    Procedure: REMOVAL, VASCULAR ACCESS PORT LEFT;  Surgeon: Rajan More MD;  Location: [facility] OR     REPAIR TENDON ELBOW Right 10/02/2019    Procedure: Right Elbow Flexor Lengthening, Flexor Pronator Slide Of Wrist and Finger, Thumb Adductor Lengthening;  Surgeon: Anai Franco MD;  [facility]     TONSILLECTOMY Bilateral 10/02/2019    Procedure: Bilateral Tonsillectomy;  Surgeon: Farhana Guy MD;  [facility]     acetaminophen (TYLENOL) 325 MG tablet  albuterol (PROAIR HFA/PROVENTIL HFA/VENTOLIN HFA) 108 (90 Base) MCG/ACT inhaler  albuterol (PROVENTIL) (2.5 MG/3ML) 0.083% neb solution  ARIPiprazole (ABILIFY) 2 MG tablet  budesonide-formoterol (SYMBICORT) 160-4.5 MCG/ACT Inhaler  diclofenac (VOLTAREN) 1 % topical gel  diphenhydrAMINE (BENADRYL) 25 MG capsule  EPINEPHrine (ANY BX GENERIC EQUIV) 0.3 MG/0.3ML  injection 2-pack  Hydroxyurea 1000 MG TABS  hydrOXYzine (ATARAX) 25 MG tablet  JADENU 360 MG tablet  lidocaine-prilocaine (EMLA) 2.5-2.5 % external cream  medroxyPROGESTERone (DEPO-PROVERA) 150 MG/ML IM injection  naloxone (NARCAN) 4 MG/0.1ML nasal spray  ondansetron (ZOFRAN) 8 MG tablet  oxyCODONE (OXYCONTIN) 10 MG 12 hr tablet  oxyCODONE IR (ROXICODONE) 15 MG tablet  rivaroxaban ANTICOAGULANT (XARELTO ANTICOAGULANT) 20 MG TABS tablet  sertraline (ZOLOFT) 100 MG tablet      Allergies   Allergen Reactions     Contrast Dye      Hives and breathing issues     Fish-Derived Products Hives     Seafood Hives     Diagnostic X-Ray Materials      Gadolinium      Family History  Family History   Problem Relation Age of Onset     Sickle Cell Trait Mother      Hypertension Mother      Asthma Mother      Sickle Cell Trait Father      Social History   Social History     Tobacco Use     Smoking status: Never Smoker     Smokeless tobacco: Never Used   Substance Use Topics     Alcohol use: Not Currently     Alcohol/week: 0.0 standard drinks     Drug use: Never      Past medical history, past surgical history, medications, allergies, family history, and social history were reviewed with the patient. No additional pertinent items.       Review of Systems   Constitutional: Positive for fatigue. Negative for chills and fever.   HENT: Negative.    Eyes: Negative for visual disturbance.   Cardiovascular: Negative for chest pain, palpitations and leg swelling.   Gastrointestinal: Negative for abdominal pain, blood in stool, constipation, diarrhea, nausea and vomiting.   Genitourinary: Negative.  Negative for dysuria, frequency and hematuria.   Musculoskeletal: Negative for joint swelling, neck pain and neck stiffness.        Chronic diffuse LBP, bilateral LE pain   Skin: Negative.    Allergic/Immunologic: Negative for immunocompromised state.   Neurological: Positive for weakness. Negative for tremors, syncope, light-headedness, numbness  and headaches.   Hematological:        Sickle cell disease   Psychiatric/Behavioral: Negative for suicidal ideas.   All other systems reviewed and are negative.       A complete review of systems was performed with pertinent positives and negatives noted in the HPI, and all other systems negative.    Physical Exam      Physical Exam  CONSTITUTIONAL: Well-developed and well-nourished. Awake and alert. Non-toxic appearance. No acute distress.   HENT:   - Head: Normocephalic and atraumatic.   - Ears: Hearing and external ear grossly normal.   - Nose: Nose normal. No rhinorrhea. No epistaxis.   - Mouth/Throat: MMM  EYES: Conjunctivae and lids are normal. No scleral icterus.   NECK: Normal range of motion and phonation normal. Neck supple.  No tracheal deviation, no stridor. No edema or erythema noted.  CARDIOVASCULAR: Normal rate, regular rhythm and no appreciable abnormal heart sounds.  PULMONARY/CHEST: Normal work of breathing. No accessory muscle usage or stridor. No respiratory distress.  No appreciable abnormal breath sounds.  ABDOMEN: Soft, non-distended. No tenderness. No rigidity, rebound or guarding.   MUSCULOSKELETAL: Extremities warm and seemingly well perfused. No edema or calf tenderness. No focal/bony tenderness. No swelling, no joint effusions. No stepoffs or visible deformities. No new strength/sensory deficits (has changes from her prior stroke, but not changes from her usual baseline). No acute skin findings.   NEUROLOGIC: Awake, alert. Not disoriented.  Normal tone. No seizure activity. GCS 15. Baseline after previous stroke  SKIN: Skin is warm and dry. No rash noted. No diaphoresis. No pallor.   PSYCHIATRIC: Normal mood and affect. Speech and behavior normal. Thought processes linear. Cognition and memory are normal.     Assessments & Plan (with Medical Decision Making)   IMPRESSION: 21 year old female w/ PMH notable for sickle cell anemia, sickle cell pain crisis, cerebral infarction, DVT, PE,  anticoagulated on Xarelto and emergency care plan in place who presents to the ED today reporting diffuse low back and bilateral LE discomfort, consistent w/ previous sickle cell pain crisis, as described further above.  Clinically, patient appears nontoxic, NAD. Vitals have some mild regular tachycardia. Otherwise on examination, no acute findings.     Ddx includes, but not limited to, chronic pain flare, sickle cell crisis, thought to be most likely. MSK related discomfort. No traumas or falls. No new neuro symptoms or incontinence. Think unlikely acute neurologic or spinal condition. No /urinary symptoms to make me think she would have pyelonephritis, etc.     PLAN: Sickle cell labs, pain management and fluids, dispo pending ED course    RESULTS:  - Labs: Pending    INTERVENTIONS:   - IVF  - IV morphine according to ED Care Plan    DISCUSSIONS:  - w/ Patient: I have reviewed the available findings, tentative plan with the patient. She expressed understanding and agreement with this plan. All questions answered to the best of our ability at this time.     SIGNOUT:  - Patient signed out to overnight EM Physician.  - Impression at shift change: Sickle cell pain, consistent w/ previous  - Pending at shift change: Labs, clinical reassessment  - Tentative plan: F/U Labs, clinical reassessement      ______________________________________________________________________        --  Reta Miramontes MD  [facility] EMERGENCY DEPARTMENT  6/25/2021     Reta Miramontes MD  06/26/21 2613

## 2021-06-25 NOTE — ED TRIAGE NOTES
Pt BIB self for sickle cell pain crisis.  Started yesterday evening even after home medication use.  States pain is in her legs, stomach, and occasionally her chest.

## 2021-06-25 NOTE — DISCHARGE INSTRUCTIONS
TODAY'S VISIT:  - You should discuss all imaging/radiology tests and laboratory tests that were performed during this visit with your usual providers to ensure you continue to improve and do not need any further evaluation, testing or management.   - Please call your Primary Care team to discuss and arrange a follow-up appointment.     FOLLOW-UP:  Please make an appointment to follow up with:  - Your Primary Care Provider and Hematology Oncology Clinic (phone: [phone]) as soon as possible.  - If you do not have a primary care provider, you can be seen in follow-up and establish care with one of our providers by calling of the the clinics below:  --- [facility] (phone: [phone])  --- Primary [facility] (phone: [phone])   - Have your provider review the results from today's visit with you again to make sure no further follow-up or additional testing is needed based on those results.     OTHER INSTRUCTIONS:  - Do your best to stay hydrated.    RETURN TO THE EMERGENCY DEPARTMENT  Return to the Emergency Department immediately for any new or worsening symptoms or any concerns.     Remember that you can always come back to the Emergency Department if you are not able to see your regular doctor in the amount of time listed above, if you get any new symptoms, or if there is anything that worries you.

## 2021-06-25 NOTE — ED NOTES
Emergency Department Patient Sign-out       Brief HPI and ED course:  Patient is a 21 year old female signed out to me by Dr. Miramontes.  See initial ED Provider note for details of the presentation. In brief, patient with back and legs pain with quality similar to past sickle cell pain.     Vitals:   Patient Vitals for the past 24 hrs:   BP Temp Temp src Pulse Resp SpO2   06/25/21 0117 132/80 98.3  F (36.8  C) Oral 113 18 94 %       Received Sign-out Plan:    Pending studies include: CBC, CMP, retic, pregnancy      Plan:   - f/u labs, f/u pain control. Likely outpatient management if labs unremarkable    Events after assuming care:  After care was assumed, a focused history and physical was performed. Agree with findings relayed by previous provider.     Labs unremarkable. Pain well controlled after IV morphine. Patient then requesting discharge home.     The complete clinical picture is most consistent with sickle cell pain crisis. After counseling on the diagnosis, work-up, and treatment plan, the patient was discharged to home. The patient was advised to follow-up with hematology in a few days. The patient was advised to return to the ED if worsening symptoms, or if there are any urgent/life-threatening concerns.     Final diagnoses:   Sickle cell pain crisis (H)     Discharge Medication List as of 6/25/2021  7:20 AM          --  Huang Domingo MD   Emergency Medicine   [facility] EMERGENCY DEPARTMENT  6/25/2021       Huang Domingo MD  06/28/21 0554

## 2021-06-26 ENCOUNTER — HOSPITAL ENCOUNTER (EMERGENCY)
Facility: CLINIC | Age: 21
Discharge: HOME | End: 2021-06-26
Attending: EMERGENCY MEDICINE | Admitting: EMERGENCY MEDICINE
Payer: COMMERCIAL

## 2021-06-26 ENCOUNTER — APPOINTMENT (OUTPATIENT)
Dept: GENERAL RADIOLOGY | Facility: CLINIC | Age: 21
End: 2021-06-26
Attending: EMERGENCY MEDICINE
Payer: COMMERCIAL

## 2021-06-26 VITALS
BODY MASS INDEX: 29.02 KG/M2 | OXYGEN SATURATION: 94 % | HEART RATE: 108 BPM | TEMPERATURE: 98.3 F | WEIGHT: 170 LBS | SYSTOLIC BLOOD PRESSURE: 125 MMHG | DIASTOLIC BLOOD PRESSURE: 77 MMHG | RESPIRATION RATE: 20 BRPM | HEIGHT: 64 IN

## 2021-06-26 DIAGNOSIS — D57.00 SICKLE CELL PAIN CRISIS (H): ICD-10-CM

## 2021-06-26 LAB
ANION GAP SERPL CALCULATED.3IONS-SCNC: 5 MMOL/L (ref 3–14)
ANISOCYTOSIS BLD QL SMEAR: ABNORMAL
BASOPHILS # BLD AUTO: 0.2 10E9/L (ref 0–0.2)
BASOPHILS NFR BLD AUTO: 1.8 %
BUN SERPL-MCNC: 11 MG/DL (ref 7–30)
CALCIUM SERPL-MCNC: 8.6 MG/DL (ref 8.5–10.1)
CHLORIDE SERPL-SCNC: 111 MMOL/L (ref 94–109)
CO2 SERPL-SCNC: 23 MMOL/L (ref 20–32)
CREAT SERPL-MCNC: 0.64 MG/DL (ref 0.52–1.04)
DIFFERENTIAL METHOD BLD: ABNORMAL
EOSINOPHIL # BLD AUTO: 2.3 10E9/L (ref 0–0.7)
EOSINOPHIL NFR BLD AUTO: 17.4 %
ERYTHROCYTE [DISTWIDTH] IN BLOOD BY AUTOMATED COUNT: 23.9 % (ref 10–15)
GFR SERPL CREATININE-BSD FRML MDRD: >90 ML/MIN/{1.73_M2}
GLUCOSE SERPL-MCNC: 116 MG/DL (ref 70–99)
HCG SERPL QL: NEGATIVE
HCT VFR BLD AUTO: 23.3 % (ref 35–47)
HGB BLD-MCNC: 8.3 G/DL (ref 11.7–15.7)
LACTATE BLD-SCNC: 1.3 MMOL/L (ref 0.7–2)
LYMPHOCYTES # BLD AUTO: 4.3 10E9/L (ref 0.8–5.3)
LYMPHOCYTES NFR BLD AUTO: 32.1 %
MCH RBC QN AUTO: 31.7 PG (ref 26.5–33)
MCHC RBC AUTO-ENTMCNC: 35.6 G/DL (ref 31.5–36.5)
MCV RBC AUTO: 89 FL (ref 78–100)
MONOCYTES # BLD AUTO: 0.6 10E9/L (ref 0–1.3)
MONOCYTES NFR BLD AUTO: 4.6 %
NEUTROPHILS # BLD AUTO: 6 10E9/L (ref 1.6–8.3)
NEUTROPHILS NFR BLD AUTO: 44.1 %
NRBC # BLD AUTO: 1.5 10*3/UL
NRBC BLD AUTO-RTO: 11 /100
OVALOCYTES BLD QL SMEAR: SLIGHT
PLATELET # BLD AUTO: 253 10E9/L (ref 150–450)
PLATELET # BLD EST: ABNORMAL 10*3/UL
POIKILOCYTOSIS BLD QL SMEAR: ABNORMAL
POLYCHROMASIA BLD QL SMEAR: ABNORMAL
POTASSIUM SERPL-SCNC: 4.2 MMOL/L (ref 3.4–5.3)
RBC # BLD AUTO: 2.62 10E12/L (ref 3.8–5.2)
RETICS # AUTO: 594.7 10E9/L (ref 25–95)
RETICS/RBC NFR AUTO: 22.7 % (ref 0.5–2)
SICKLE CELLS BLD QL SMEAR: ABNORMAL
SODIUM SERPL-SCNC: 139 MMOL/L (ref 133–144)
WBC # BLD AUTO: 13.5 10E9/L (ref 4–11)

## 2021-06-26 PROCEDURE — 71046 X-RAY EXAM CHEST 2 VIEWS: CPT

## 2021-06-26 PROCEDURE — 71046 X-RAY EXAM CHEST 2 VIEWS: CPT | Mod: 26 | Performed by: RADIOLOGY

## 2021-06-26 PROCEDURE — 96376 TX/PRO/DX INJ SAME DRUG ADON: CPT | Performed by: EMERGENCY MEDICINE

## 2021-06-26 PROCEDURE — 96374 THER/PROPH/DIAG INJ IV PUSH: CPT | Performed by: EMERGENCY MEDICINE

## 2021-06-26 PROCEDURE — 85045 AUTOMATED RETICULOCYTE COUNT: CPT | Performed by: EMERGENCY MEDICINE

## 2021-06-26 PROCEDURE — 99285 EMERGENCY DEPT VISIT HI MDM: CPT | Performed by: EMERGENCY MEDICINE

## 2021-06-26 PROCEDURE — 80048 BASIC METABOLIC PNL TOTAL CA: CPT | Performed by: EMERGENCY MEDICINE

## 2021-06-26 PROCEDURE — 85025 COMPLETE CBC W/AUTO DIFF WBC: CPT | Performed by: EMERGENCY MEDICINE

## 2021-06-26 PROCEDURE — 83605 ASSAY OF LACTIC ACID: CPT | Performed by: EMERGENCY MEDICINE

## 2021-06-26 PROCEDURE — 99285 EMERGENCY DEPT VISIT HI MDM: CPT | Mod: 25 | Performed by: EMERGENCY MEDICINE

## 2021-06-26 PROCEDURE — 84703 CHORIONIC GONADOTROPIN ASSAY: CPT | Performed by: EMERGENCY MEDICINE

## 2021-06-26 PROCEDURE — 258N000003 HC RX IP 258 OP 636: Performed by: EMERGENCY MEDICINE

## 2021-06-26 PROCEDURE — 250N000011 HC RX IP 250 OP 636: Performed by: EMERGENCY MEDICINE

## 2021-06-26 PROCEDURE — 96361 HYDRATE IV INFUSION ADD-ON: CPT | Performed by: EMERGENCY MEDICINE

## 2021-06-26 RX ORDER — MORPHINE SULFATE 2 MG/ML
2 INJECTION, SOLUTION INTRAMUSCULAR; INTRAVENOUS
Status: COMPLETED | OUTPATIENT
Start: 2021-06-26 | End: 2021-06-26

## 2021-06-26 RX ORDER — ALBUTEROL SULFATE 0.83 MG/ML
2.5 SOLUTION RESPIRATORY (INHALATION)
Status: DISCONTINUED | OUTPATIENT
Start: 2021-06-26 | End: 2021-06-26 | Stop reason: HOSPADM

## 2021-06-26 RX ADMIN — MORPHINE SULFATE 2 MG: 2 INJECTION, SOLUTION INTRAMUSCULAR; INTRAVENOUS at 03:50

## 2021-06-26 RX ADMIN — SODIUM CHLORIDE, POTASSIUM CHLORIDE, SODIUM LACTATE AND CALCIUM CHLORIDE 1000 ML: 600; 310; 30; 20 INJECTION, SOLUTION INTRAVENOUS at 02:34

## 2021-06-26 RX ADMIN — MORPHINE SULFATE 2 MG: 2 INJECTION, SOLUTION INTRAMUSCULAR; INTRAVENOUS at 02:35

## 2021-06-26 RX ADMIN — MORPHINE SULFATE 2 MG: 2 INJECTION, SOLUTION INTRAMUSCULAR; INTRAVENOUS at 05:27

## 2021-06-26 ASSESSMENT — ENCOUNTER SYMPTOMS
HEADACHES: 0
TREMORS: 0
WEAKNESS: 1
VOMITING: 0
BLOOD IN STOOL: 0
LIGHT-HEADEDNESS: 0
CHILLS: 0
CONSTIPATION: 0
NUMBNESS: 0
HEMATURIA: 0
DYSURIA: 0
FATIGUE: 1
DIARRHEA: 0
ABDOMINAL PAIN: 0
FREQUENCY: 0
PALPITATIONS: 0
NECK PAIN: 0
FEVER: 0
JOINT SWELLING: 0
NAUSEA: 0
NECK STIFFNESS: 0

## 2021-06-26 ASSESSMENT — MIFFLIN-ST. JEOR: SCORE: 1516.11

## 2021-06-26 NOTE — DISCHARGE INSTRUCTIONS
Thank you for coming to the [facility] Emergency Department.   Please follow up with your Hematology Clinic this week if pain continues to be difficult to manage at home with your usual routine.

## 2021-06-26 NOTE — ED TRIAGE NOTES
Pt presents with c/o generalized body pain due to sickle chris x 1 day.  Denies chest pain, fever or sob.  Took oxycodone with tylenol an hour PTA.

## 2021-06-26 NOTE — ED PROVIDER NOTES
[facility] EMERGENCY DEPARTMENT ([facility])  6/26/21    History     Chief Complaint   Patient presents with     Sickle Cell Pain Crisis     The history is provided by the patient and medical records.     Jennifer Cervantes is a 21 year old female with a medical history significant for sickle cell anemia, sickle cell pain crisis, cerebral infarction, DVT, PE who presents to the emergency department for evaluation of pain.     Has noted an increase in pain in the last 1-2 weeks since starting crizanlizumab infusion. Has required ED visits on 6/20, 6/21, 6/23, 6/24 as well. Today she has taken oxycontin 10mg twice and used oxycodone q6 hours to her max daily dose.     No nausea, vomiting, diarrhea, fever. Does have a dry cough. Does not feel short of breath. Is on anticoagulation for PE, as well as superficial and deep venous clots.     Pain is located all over he body, worst in the back. Pain does feel typical to her, other than the intensity.     She is currently running an infusion of Desferal for iron overload.         Past Medical History:   Diagnosis Date     Anxiety      Bleeding disorder (H)      Blood clotting disorder (H)      Cerebral infarction (H) 2015     Cognitive developmental delay     low IQ. 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 (H)     right hand contractures     Iron overload due to repeated red blood cell transfusions      Migraines      Multiple subsegmental pulmonary emboli without acute cor pulmonale (H) 02/01/2021     Oppositional defiant behavior      Superficial venous thrombosis of arm, right 03/25/2021     Uncomplicated asthma        Past Surgical History:   Procedure Laterality Date     AS INSERT TUNNELED CV 2 CATH W/O PORT/PUMP       C BREAST REDUCTION (INCLUDES LIPO) TIER 3 Bilateral 04/23/2019     CHOLECYSTECTOMY       INSERT PORT VASCULAR ACCESS  Left 4/21/2021    Procedure: INSERTION, VASCULAR ACCESS PORT (NOT SURE ON SIDE UNTIL REMOVAL);  Surgeon: Rajan More MD;  Location: [facility] OR     IR CHEST PORT PLACEMENT > 5 YRS OF AGE  4/21/2021     IR CVC NON TUNNEL LINE REMOVAL  5/6/2021     IR CVC NON TUNNEL PLACEMENT  04/07/2020     IR CVC NON TUNNEL PLACEMENT  4/30/2021     IR PORT REMOVAL LEFT  4/21/2021     REMOVE PORT VASCULAR ACCESS Left 4/21/2021    Procedure: REMOVAL, VASCULAR ACCESS PORT LEFT;  Surgeon: Rajan More MD;  Location: [facility] OR     REPAIR TENDON ELBOW Right 10/02/2019    Procedure: Right Elbow Flexor Lengthening, Flexor Pronator Slide Of Wrist and Finger, Thumb Adductor Lengthening;  Surgeon: Anai Franco MD;  [facility]     TONSILLECTOMY Bilateral 10/02/2019    Procedure: Bilateral Tonsillectomy;  Surgeon: Farhana Guy MD;  [facility]       Family History   Problem Relation Age of Onset     Sickle Cell Trait Mother      Hypertension Mother      Asthma Mother      Sickle Cell Trait Father        Social History     Tobacco Use     Smoking status: Never Smoker     Smokeless tobacco: Never Used   Substance Use Topics     Alcohol use: Not Currently     Alcohol/week: 0.0 standard drinks       Current Facility-Administered Medications   Medication     lactated ringers BOLUS 1,000 mL     medroxyPROGESTERone (DEPO-PROVERA) syringe 150 mg     morphine (PF) injection 2 mg     Current Outpatient Medications   Medication     acetaminophen (TYLENOL) 325 MG tablet     albuterol (PROAIR HFA/PROVENTIL HFA/VENTOLIN HFA) 108 (90 Base) MCG/ACT inhaler     albuterol (PROVENTIL) (2.5 MG/3ML) 0.083% neb solution     ARIPiprazole (ABILIFY) 2 MG tablet     budesonide-formoterol (SYMBICORT) 160-4.5 MCG/ACT Inhaler     diclofenac (VOLTAREN) 1 % topical gel     diphenhydrAMINE (BENADRYL) 25 MG capsule     EPINEPHrine (ANY BX GENERIC EQUIV) 0.3 MG/0.3ML injection 2-pack     Hydroxyurea 1000 MG TABS     hydrOXYzine (ATARAX) 25 MG  "tablet     JADENU 360 MG tablet     lidocaine-prilocaine (EMLA) 2.5-2.5 % external cream     medroxyPROGESTERone (DEPO-PROVERA) 150 MG/ML IM injection     naloxone (NARCAN) 4 MG/0.1ML nasal spray     ondansetron (ZOFRAN) 8 MG tablet     oxyCODONE (OXYCONTIN) 10 MG 12 hr tablet     oxyCODONE IR (ROXICODONE) 15 MG tablet     rivaroxaban ANTICOAGULANT (XARELTO ANTICOAGULANT) 20 MG TABS tablet     sertraline (ZOLOFT) 100 MG tablet        Allergies   Allergen Reactions     Contrast Dye      Hives and breathing issues     Fish-Derived Products Hives     Seafood Hives     Diagnostic X-Ray Materials      Gadolinium      Review of Systems  A complete review of systems was performed with pertinent positives and negatives noted in the HPI, and all other systems negative.    Physical Exam   BP: 136/87  Pulse: 116  Temp: 98.3  F (36.8  C)  Resp: 20  Height: 162.6 cm (5' 4\")  Weight: 77.1 kg (170 lb)  SpO2: 93 %     Physical Exam  Gen: A&Ox3, no acute distress  HEENT:PERRL, no facial tenderness or wounds, head atraumatic, oropharynx clear, mucous membranes moist, TMs clear bilaterally  CV:RRR without murmurs  PULM:Clear to auscultation bilaterally  Abd:soft, nontender, nondistended. Bowel sounds present and normal  UE:No traumatic injuries, skin normal  LE:no traumatic injuries, skin normal, no LE edema  Neuro:CN II-XII intact, strength 5/5 throughout, gait stable  Skin: no rashes or ecchymoses      ED Course       Procedures         Results for orders placed or performed during the hospital encounter of 06/26/21   Chest XR,  PA & LAT     Status: None    Narrative    EXAM: XR CHEST 2 VW  LOCATION: [facility]  DATE/TIME: 6/26/2021 1:09 AM    INDICATION: Cough. Sickle cell.  COMPARISON: 6/6/2021.    FINDINGS: Left chest wall port. The heart size is normal. The lungs are clear. No pneumothorax or pleural effusion.      Impression    IMPRESSION: No acute abnormality.     Medications   lactated ringers BOLUS 1,000 mL " (has no administration in time range)   morphine (PF) injection 2 mg (has no administration in time range)        Assessments & Plan (with Medical Decision Making)   22 yo F with a hx of sickle cell disease presenting with pain.   Typical other than intensity and recent increased ED visits for IV management.   Seems to be adherent to her home management plan and currently actively engaged in new therapy with crizanlizumab.   Arrives afebrile, slightly tachycardic. RR and work of breathing are not labored. O2 sat 93% on RA.   CXR clear.   IV access obtained by PIV.  Her care plan was reviewed with her and she was given morphine 2mg IV q1 hr x3 and 1L LR. She did not require PRN benadryl or zofran today.   Labs with stable mild increase in LFTs. Hgb 8.4 today.   Retic 25.5% today    Signed out to Dr. Milton at 2AM. Anticipating discharge home if pain manageable.       I have reviewed the nursing notes. I have reviewed the findings, diagnosis, plan and need for follow up with the patient.    New Prescriptions    No medications on file       Final diagnoses:   Sickle cell pain crisis (H)       --  Cornelia Malloy MD [facility] EMERGENCY DEPARTMENT  6/26/2021     Cornelia Malloy MD  06/26/21 6582

## 2021-06-27 ENCOUNTER — HOSPITAL ENCOUNTER (EMERGENCY)
Facility: CLINIC | Age: 21
Discharge: HOME | End: 2021-06-27
Attending: EMERGENCY MEDICINE | Admitting: EMERGENCY MEDICINE
Payer: COMMERCIAL

## 2021-06-27 VITALS
TEMPERATURE: 98.3 F | RESPIRATION RATE: 18 BRPM | SYSTOLIC BLOOD PRESSURE: 123 MMHG | DIASTOLIC BLOOD PRESSURE: 77 MMHG | BODY MASS INDEX: 29.02 KG/M2 | HEIGHT: 64 IN | HEART RATE: 108 BPM | WEIGHT: 170 LBS | OXYGEN SATURATION: 97 %

## 2021-06-27 DIAGNOSIS — D57.00 SICKLE CELL PAIN CRISIS (H): ICD-10-CM

## 2021-06-27 LAB
ANISOCYTOSIS BLD QL SMEAR: ABNORMAL
BASOPHILS # BLD AUTO: 0 10E9/L (ref 0–0.2)
BASOPHILS NFR BLD AUTO: 0 %
DIFFERENTIAL METHOD BLD: ABNORMAL
ELLIPTOCYTES BLD QL SMEAR: ABNORMAL
EOSINOPHIL # BLD AUTO: 1.7 10E9/L (ref 0–0.7)
EOSINOPHIL NFR BLD AUTO: 13.8 %
ERYTHROCYTE [DISTWIDTH] IN BLOOD BY AUTOMATED COUNT: 24.3 % (ref 10–15)
HCT VFR BLD AUTO: 22.7 % (ref 35–47)
HGB BLD-MCNC: 8.5 G/DL (ref 11.7–15.7)
LYMPHOCYTES # BLD AUTO: 4.1 10E9/L (ref 0.8–5.3)
LYMPHOCYTES NFR BLD AUTO: 33 %
MCH RBC QN AUTO: 32.8 PG (ref 26.5–33)
MCHC RBC AUTO-ENTMCNC: 37.4 G/DL (ref 31.5–36.5)
MCV RBC AUTO: 88 FL (ref 78–100)
MONOCYTES # BLD AUTO: 1.2 10E9/L (ref 0–1.3)
MONOCYTES NFR BLD AUTO: 9.2 %
NEUTROPHILS # BLD AUTO: 5.5 10E9/L (ref 1.6–8.3)
NEUTROPHILS NFR BLD AUTO: 44 %
NRBC # BLD AUTO: 1.4 10*3/UL
NRBC BLD AUTO-RTO: 11 /100
OVALOCYTES BLD QL SMEAR: ABNORMAL
PLATELET # BLD AUTO: 250 10E9/L (ref 150–450)
PLATELET # BLD EST: ABNORMAL 10*3/UL
POIKILOCYTOSIS BLD QL SMEAR: ABNORMAL
POLYCHROMASIA BLD QL SMEAR: ABNORMAL
RBC # BLD AUTO: 2.59 10E12/L (ref 3.8–5.2)
SICKLE CELLS BLD QL SMEAR: ABNORMAL
WBC # BLD AUTO: 12.5 10E9/L (ref 4–11)

## 2021-06-27 PROCEDURE — 96376 TX/PRO/DX INJ SAME DRUG ADON: CPT | Performed by: EMERGENCY MEDICINE

## 2021-06-27 PROCEDURE — 96361 HYDRATE IV INFUSION ADD-ON: CPT | Performed by: EMERGENCY MEDICINE

## 2021-06-27 PROCEDURE — 258N000003 HC RX IP 258 OP 636: Performed by: EMERGENCY MEDICINE

## 2021-06-27 PROCEDURE — 96374 THER/PROPH/DIAG INJ IV PUSH: CPT | Performed by: EMERGENCY MEDICINE

## 2021-06-27 PROCEDURE — 250N000011 HC RX IP 250 OP 636: Performed by: EMERGENCY MEDICINE

## 2021-06-27 PROCEDURE — 85025 COMPLETE CBC W/AUTO DIFF WBC: CPT | Performed by: EMERGENCY MEDICINE

## 2021-06-27 PROCEDURE — 999N000127 HC STATISTIC PERIPHERAL IV START W US GUIDANCE

## 2021-06-27 RX ORDER — ONDANSETRON 2 MG/ML
8 INJECTION INTRAMUSCULAR; INTRAVENOUS
Status: DISCONTINUED | OUTPATIENT
Start: 2021-06-27 | End: 2021-06-27 | Stop reason: HOSPADM

## 2021-06-27 RX ORDER — MORPHINE SULFATE 4 MG/ML
2 INJECTION, SOLUTION INTRAMUSCULAR; INTRAVENOUS
Status: COMPLETED | OUTPATIENT
Start: 2021-06-27 | End: 2021-06-27

## 2021-06-27 RX ORDER — DIPHENHYDRAMINE HCL 25 MG
25 CAPSULE ORAL
Status: DISCONTINUED | OUTPATIENT
Start: 2021-06-27 | End: 2021-06-27 | Stop reason: HOSPADM

## 2021-06-27 RX ADMIN — MORPHINE SULFATE 2 MG: 4 INJECTION INTRAVENOUS at 05:49

## 2021-06-27 RX ADMIN — MORPHINE SULFATE 2 MG: 4 INJECTION INTRAVENOUS at 02:22

## 2021-06-27 RX ADMIN — MORPHINE SULFATE 2 MG: 4 INJECTION INTRAVENOUS at 04:47

## 2021-06-27 RX ADMIN — SODIUM CHLORIDE, POTASSIUM CHLORIDE, SODIUM LACTATE AND CALCIUM CHLORIDE 1000 ML: 600; 310; 30; 20 INJECTION, SOLUTION INTRAVENOUS at 02:22

## 2021-06-27 ASSESSMENT — MIFFLIN-ST. JEOR: SCORE: 1516.11

## 2021-06-27 NOTE — ED PROVIDER NOTES
History     Chief Complaint   Patient presents with     Sickle Cell Pain Crisis     HPI  Jennifer Cervantes is a 21 year old female with PMH notable for sickle cell anemia, sickle cell pain crises, frequent emergency department presentations who presents to ED with pain.  Patient reports pain is diffuse, is the same quality as the pain that she has been experiencing frequently over the past week.  She has been to the ED 5 other times in the past 7 days for the same symptoms, has achieved adequate pain control during each visit and then discharged home.  Patient denies chest pain, denies cough, denies fever, denies vomiting, denies diarrhea, denies abdominal pain.      This part of the medical record was transcribed by Sisi Alejandra Medical Scribe, from a dictation done by Huang Domingo MD.       Past Medical History  Past Medical History:   Diagnosis Date     Anxiety      Bleeding disorder (H)      Blood clotting disorder (H)      Cerebral infarction (H) 2015     Cognitive developmental delay     low IQ. 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 (H)     right hand contractures     Iron overload due to repeated red blood cell transfusions      Migraines      Multiple subsegmental pulmonary emboli without acute cor pulmonale (H) 02/01/2021     Oppositional defiant behavior      Superficial venous thrombosis of arm, right 03/25/2021     Uncomplicated asthma      Past Surgical History:   Procedure Laterality Date     AS INSERT TUNNELED CV 2 CATH W/O PORT/PUMP       C BREAST REDUCTION (INCLUDES LIPO) TIER 3 Bilateral 04/23/2019     CHOLECYSTECTOMY       INSERT PORT VASCULAR ACCESS Left 4/21/2021    Procedure: INSERTION, VASCULAR ACCESS PORT (NOT SURE ON SIDE UNTIL REMOVAL);  Surgeon: Rajan More MD;  Location: [facility] OR     IR CHEST PORT PLACEMENT > 5 YRS OF AGE  4/21/2021     IR CVC NON  TUNNEL LINE REMOVAL  5/6/2021     IR CVC NON TUNNEL PLACEMENT  04/07/2020     IR CVC NON TUNNEL PLACEMENT  4/30/2021     IR PORT REMOVAL LEFT  4/21/2021     REMOVE PORT VASCULAR ACCESS Left 4/21/2021    Procedure: REMOVAL, VASCULAR ACCESS PORT LEFT;  Surgeon: Rajan More MD;  Location: [facility] OR     REPAIR TENDON ELBOW Right 10/02/2019    Procedure: Right Elbow Flexor Lengthening, Flexor Pronator Slide Of Wrist and Finger, Thumb Adductor Lengthening;  Surgeon: Anai Franco MD;  [facility]     TONSILLECTOMY Bilateral 10/02/2019    Procedure: Bilateral Tonsillectomy;  Surgeon: Farhana Guy MD;  [facility]     acetaminophen (TYLENOL) 325 MG tablet  albuterol (PROAIR HFA/PROVENTIL HFA/VENTOLIN HFA) 108 (90 Base) MCG/ACT inhaler  albuterol (PROVENTIL) (2.5 MG/3ML) 0.083% neb solution  ARIPiprazole (ABILIFY) 2 MG tablet  budesonide-formoterol (SYMBICORT) 160-4.5 MCG/ACT Inhaler  diclofenac (VOLTAREN) 1 % topical gel  diphenhydrAMINE (BENADRYL) 25 MG capsule  EPINEPHrine (ANY BX GENERIC EQUIV) 0.3 MG/0.3ML injection 2-pack  Hydroxyurea 1000 MG TABS  hydrOXYzine (ATARAX) 25 MG tablet  JADENU 360 MG tablet  lidocaine-prilocaine (EMLA) 2.5-2.5 % external cream  medroxyPROGESTERone (DEPO-PROVERA) 150 MG/ML IM injection  naloxone (NARCAN) 4 MG/0.1ML nasal spray  ondansetron (ZOFRAN) 8 MG tablet  oxyCODONE (OXYCONTIN) 10 MG 12 hr tablet  oxyCODONE IR (ROXICODONE) 15 MG tablet  rivaroxaban ANTICOAGULANT (XARELTO ANTICOAGULANT) 20 MG TABS tablet  sertraline (ZOLOFT) 100 MG tablet      Allergies   Allergen Reactions     Contrast Dye      Hives and breathing issues     Fish-Derived Products Hives     Seafood Hives     Diagnostic X-Ray Materials      Gadolinium      Family History  Family History   Problem Relation Age of Onset     Sickle Cell Trait Mother      Hypertension Mother      Asthma Mother      Sickle Cell Trait Father      Social History   Social History     Tobacco Use     Smoking  "status: Never Smoker     Smokeless tobacco: Never Used   Substance Use Topics     Alcohol use: Not Currently     Alcohol/week: 0.0 standard drinks     Drug use: Never      Past medical history, past surgical history, medications, allergies, family history, and social history were reviewed with the patient. No additional pertinent items.      Review of Systems  A complete review of systems was performed with pertinent positives and negatives noted in the HPI, and all other systems negative.    Physical Exam   BP: 134/78  Pulse: 112  Temp: 98.3  F (36.8  C)  Resp: 16  Height: 162.6 cm (5' 4\")  Weight: 77.1 kg (170 lb)  SpO2: 92 %    Physical Exam  General: uncomfortable appearing. Appears stated age.   HENT: MMM, no oropharyngeal lesions  Eyes: PERRL, normal sclerae  Cardio: mildly tachycardic rate. Regular rhythm. Extremities well perfused  Resp: Normal work of breathing, normal respiratory rate.  Chest/Back: no visual signs of trauma, no CVA tenderness  Abdomen: no tenderness, non-distended, no rebound, no guarding  Neuro: alert and fully oriented. CN II-XII grossly intact. Grossly normal strength and sensation in all extremities.   MSK: no deformities. Grossly normal ROM in extremities. Mild tenderness diffusely in the extremities.   Integumentary/Skin: no rash visualized, normal color  Psych: normal affect, normal behavior    ED Course      Procedures           Labs Ordered and Resulted from Time of ED Arrival Up to the Time of Departure from the ED   CBC WITH PLATELETS DIFFERENTIAL - Abnormal; Notable for the following components:       Result Value    WBC 12.5 (*)     RBC Count 2.59 (*)     Hemoglobin 8.5 (*)     Hematocrit 22.7 (*)     MCHC 37.4 (*)     RDW 24.3 (*)     Nucleated RBCs 11 (*)     Absolute Eosinophils 1.7 (*)     All other components within normal limits     No orders to display          Assessments & Plan (with Medical Decision Making)   Patient presenting with diffuse pain with similar quality " as past sickle cell pain crises.  Vitals in the ED with initial mild tachycardia, otherwise unremarkable.  Nursing notes reviewed.    Hemoglobin stable at 9.5, retic percentage 23.7 about 24 hours ago, no indication for repeat over such a short interval.  Chemistry panel, lactate, pregnancy negative about 24 hours ago as well.    In the ED the patient's symptoms were treated with IV morphine and crystalloid with improvement in symptoms upon reassessment.  Patient then requesting discharge.    Complete clinical picture is most consistent with sickle cell pain crisis.  After counseling on diagnosis, work-up, and treatment plan, the patient was discharged home.  Patient advised follow-up with heme-onc in a few days.  Patient advised to return to the ED if worsening symptoms, fever, or any other urgent or life threatening concerns.    This part of the medical record was transcribed by Sisi Alejandra Medical Scribe, from a dictation done by Huang Domingo MD.       Final diagnoses:   Sickle cell pain crisis (H)     Discharge Medication List as of 6/27/2021  6:26 AM          --  Huang Domingo MD   Emergency Medicine   [facility] EMERGENCY DEPARTMENT  6/26/2021     Huang Domingo MD  06/28/21 0549

## 2021-06-27 NOTE — DISCHARGE INSTRUCTIONS
Instructions from your doctor today:  Emergency Department testing is focused on the potential causes of your symptoms that are the most dangerous possibilities, and cannot cover every possibility. Based on the evaluation, it was deemed sufficiently safe to discharge and continue management through the clinics. Thus, follow-up is very important to assess for improvement/worsening, potential further testing, and potential treatment adjustments. If you were given opioid pain medications or other medications that can make you drowsy while in the ED, you should not drive for at least several hours and not until you feel completely back to normal.     Please make an appointment to follow up with:  - Hematology Oncology Clinic (phone: [phone]) in 2-3 days  - If you do not have a primary care provider, you can be seen in follow-up and establish care by calling any of the clinics below:     - Primary Care Center (phone: [phone])     - Primary Care / [facility] (phone: [phone])   - Have your clinic provider review the results from today's visit with you again, including any potential follow-up or additional testing that may be needed based on the results. Occasionally, incidental findings are found on later review by radiologists that may need follow-up.     Return to the Emergency Department immediately if you have worsening symptoms, or any other urgent or potentially life-threatening concerns.

## 2021-06-28 ENCOUNTER — INFUSION THERAPY VISIT (OUTPATIENT)
Dept: ONCOLOGY | Facility: CLINIC | Age: 21
End: 2021-06-28
Attending: PEDIATRICS
Payer: COMMERCIAL

## 2021-06-28 VITALS
RESPIRATION RATE: 18 BRPM | TEMPERATURE: 98.6 F | DIASTOLIC BLOOD PRESSURE: 78 MMHG | OXYGEN SATURATION: 94 % | SYSTOLIC BLOOD PRESSURE: 111 MMHG | HEART RATE: 104 BPM

## 2021-06-28 DIAGNOSIS — D57.00 SICKLE CELL PAIN CRISIS (H): ICD-10-CM

## 2021-06-28 DIAGNOSIS — D57.00 SICKLE CELL PAIN CRISIS (H): Primary | ICD-10-CM

## 2021-06-28 PROCEDURE — 96374 THER/PROPH/DIAG INJ IV PUSH: CPT

## 2021-06-28 PROCEDURE — 258N000003 HC RX IP 258 OP 636: Performed by: PEDIATRICS

## 2021-06-28 PROCEDURE — 250N000011 HC RX IP 250 OP 636: Performed by: PEDIATRICS

## 2021-06-28 PROCEDURE — 96376 TX/PRO/DX INJ SAME DRUG ADON: CPT

## 2021-06-28 PROCEDURE — 96361 HYDRATE IV INFUSION ADD-ON: CPT

## 2021-06-28 PROCEDURE — 99207 PR NO BILLABLE SERVICE THIS VISIT: CPT

## 2021-06-28 RX ORDER — OXYCODONE HYDROCHLORIDE 15 MG/1
TABLET ORAL
Qty: 60 TABLET | Refills: 0 | Status: SHIPPED | OUTPATIENT
Start: 2021-06-28 | End: 2021-07-09

## 2021-06-28 RX ORDER — MORPHINE SULFATE 2 MG/ML
2 INJECTION, SOLUTION INTRAMUSCULAR; INTRAVENOUS
Status: DISCONTINUED | OUTPATIENT
Start: 2021-06-28 | End: 2021-06-28 | Stop reason: HOSPADM

## 2021-06-28 RX ORDER — HEPARIN SODIUM (PORCINE) LOCK FLUSH IV SOLN 100 UNIT/ML 100 UNIT/ML
5 SOLUTION INTRAVENOUS
Status: CANCELLED | OUTPATIENT
Start: 2021-06-28

## 2021-06-28 RX ORDER — ONDANSETRON 8 MG/1
8 TABLET, FILM COATED ORAL
Status: CANCELLED
Start: 2021-06-28

## 2021-06-28 RX ORDER — HEPARIN SODIUM,PORCINE 10 UNIT/ML
5 VIAL (ML) INTRAVENOUS
Status: CANCELLED | OUTPATIENT
Start: 2021-06-28

## 2021-06-28 RX ORDER — MORPHINE SULFATE 2 MG/ML
2 INJECTION, SOLUTION INTRAMUSCULAR; INTRAVENOUS
Status: CANCELLED
Start: 2021-06-28

## 2021-06-28 RX ORDER — DIPHENHYDRAMINE HCL 25 MG
25 CAPSULE ORAL
Status: CANCELLED
Start: 2021-06-28

## 2021-06-28 RX ORDER — HEPARIN SODIUM (PORCINE) LOCK FLUSH IV SOLN 100 UNIT/ML 100 UNIT/ML
5 SOLUTION INTRAVENOUS
Status: DISCONTINUED | OUTPATIENT
Start: 2021-06-28 | End: 2021-06-28 | Stop reason: HOSPADM

## 2021-06-28 RX ADMIN — MORPHINE SULFATE 2 MG: 2 INJECTION, SOLUTION INTRAMUSCULAR; INTRAVENOUS at 14:34

## 2021-06-28 RX ADMIN — Medication 5 ML: at 15:45

## 2021-06-28 RX ADMIN — DEXTROSE AND SODIUM CHLORIDE 500 ML: 5; 450 INJECTION, SOLUTION INTRAVENOUS at 13:33

## 2021-06-28 RX ADMIN — MORPHINE SULFATE 2 MG: 2 INJECTION, SOLUTION INTRAMUSCULAR; INTRAVENOUS at 13:34

## 2021-06-28 RX ADMIN — MORPHINE SULFATE 2 MG: 2 INJECTION, SOLUTION INTRAMUSCULAR; INTRAVENOUS at 15:34

## 2021-06-28 NOTE — PATIENT INSTRUCTIONS
Contact Numbers  [facility]: [phone]    After Hours:  [phone]  Triage: [phone]    Please call the [facility] Triage line if you experience a temperature greater than or equal to 100.5, shaking chills, have uncontrolled nausea, vomiting and/or diarrhea, dizziness, shortness of breath, chest pain, bleeding, unexplained bruising, or if you have any other new/concerning symptoms, questions or concerns.     If it is after hours, weekends, or holidays, please call the main hospital  at  [phone] and ask to speak to the Oncology doctor on call.     If you are having any concerning symptoms or wish to speak to a provider before your next infusion visit, please call your care coordinator or triage to notify them so we can adequately serve you.     If you need a refill on a narcotic prescription or other medication, please call triage before your infusion appointment.         June 2021 Sunday Monday Tuesday Wednesday Thursday Friday Saturday             1    NEURO EVAL   7:15 AM   (60 min.)   Deborah Clark PT   [facility] [place]    Admission  12:08 PM   Jitendra Brandon DO   [facility]   (Discharge: 6/1/2021) 2    Admission   4:03 PM   [facility]   (Discharge: 6/2/2021) 3    Admission   4:18 PM   [facility]   (Discharge: 6/3/2021) 4    Admission  12:51 AM   Raul Diaz DO   [facility]   (Discharge: 6/4/2021)    CT ABDOMEN PELVIS WO   4:30 AM   (20 min.)   [id number]   [facility]    LAB CENTRAL  12:15 PM   (15 min.)    [facility] DRAW   [facility]    RETURN  12:45 PM   (30 min.)   Eric Duncan MD   [facility] 5       6    Admission   2:11 AM   Court Ring MD   [facility]   (Discharge:  6/6/2021)    XR CHEST 2 VIEWS   4:35 AM   (15 min.)   [id number]   [facility] 7     8    Admission   1:16 AM   Raul Diaz DO   [facility] Emergency Department   (Discharge: 6/8/2021) 9    Admission  11:32 PM   Reta Miramontes MD   [facility] Emergency Department   (Discharge: 6/10/2021) 10     11    Admission   1:29 AM   Andrew Chou MD   [facility] Emergency Department   (Discharge: 6/11/2021)    US VENOUS   2:25 AM   (30 min.)   [id number]   [facility]    Admission  11:29 PM   Leslie Roper MD   [facility] Emergency Department   (Discharge: 6/12/2021) 12       13    Admission   4:10 AM   Raul Diaz DO   [facility] Emergency Department   (Discharge: 6/13/2021) 14     15    ONC INFUSION 2 HR (120 MIN)  10:30 AM   (120 min.)   [facility] INFUSION NURSE   [facility] 16     17     18    LAB CENTRAL  10:30 AM   (15 min.)   [facility]   [facility]    ONC INFUSION 3 HR (180 MIN)  11:00 AM   (180 min.)   [facility] ON[facility] INFUSION NURSE   [facility]    RETURN   1:15 PM   (30 min.)   Eric Duncan MD   [facility] 19       20    Admission   2:36 AM   Andrew Chou MD   [facility] Emergency Department   (Discharge: 6/20/2021) 21    Admission   2:09 AM   Jamee Martin MD   [facility] Department   (Discharge: 6/21/2021) 22     23    Admission   2:08 AM   Mykel Luz DO   [facility] Emergency Department   (Discharge: 6/23/2021)    NEW  10:15 AM   (60 min.)   Katherine Pierce MD   [facility] 24     25    Admission   1:43 AM   [facility]   (Discharge: 6/25/2021) 26    Admission  12:43 AM   [facility] Department    (Discharge: 6/26/2021)    XR CHEST 2 VIEWS   1:10 AM   (20 min.)   [id number]   [facility]   27    Admission  12:34 AM   [facility] Emergency Department   (Discharge: 6/27/2021) 28    ONC INFUSION 2 HR (120 MIN)   1:00 PM   (120 min.)    ONC INFUSION NURSE   [facility] 29 30 July 2021 Sunday Monday Tuesday Wednesday Thursday Friday Saturday                       1     2    LAB CENTRAL   7:30 AM   (15 min.)    [facility] DRAW   [facility]    ONC INFUSION 3 HR (180 MIN)   8:00 AM   (180 min.)    ONC INFUSION NURSE   [facility] 3       4     5    RETURN   1:15 PM   (45 min.)   Andrei Machado PA   [facility] 6     7     8     9     10       11     12     13     14     15     16    LAB CENTRAL   7:30 AM   (15 min.)    [facility] DRAW   [facility]    ONC INFUSION 3 HR (180 MIN)   8:00 AM   (180 min.)    ONC INFUSION NURSE   [facility] 17       18     19    MR ABDOMEN WO   1:00 PM   (60 min.)   [id number]   [facility] 20     21     22     23     24       25     26    LAB CENTRAL  11:45 AM   (15 min.)    [facility] DRAW   [facility]    RETURN  12:15 PM   (30 min.)   Eric Duncan MD   [facility] 27     28     29     30     31                     Lab Results:  No results found for this or any previous visit (from the past 12 hour(s)).

## 2021-06-28 NOTE — TELEPHONE ENCOUNTER
Ok to come in for infusion and pain meds. Needs to arrive at the latest at 1:00 pm. Information sent to scheduling to schedule Jennifer for 1:00 pm IVF and pain medications.

## 2021-06-28 NOTE — PROGRESS NOTES
Infusion Nursing Note:  Jennifer Cervantes presents today for IVF/pain medication.    Patient seen by provider today: No   present during visit today: Not Applicable.    Note: Patient endorses non radiating constant sharp pain lower back with PS 9/10. Patient states this her usual sickle cell pain. Denies fever/chills. Denies nausea/vomiting. No new complaints made. Otherwise well.    Upon discharge patient had 3 doses of IV Morphine with PS 5/10. Patient states feeling better and agreed to go home. Patient verbalized knowledge of where and when to go if symptoms persists/worsen.       Intravenous Access:  Implanted Port.    Treatment Conditions:  Not Applicable.      Post Infusion Assessment:  Patient tolerated infusion without incident.  Blood return noted pre and post infusion.  Site patent and intact, free from redness, edema or discomfort.  No evidence of extravasations.  Access discontinued per protocol.       Discharge Plan:   Prescription refills given for Roxicodone.  Discharge instructions reviewed with: Patient.  Patient and/or family verbalized understanding of discharge instructions and all questions answered.  AVS to patient via [employer].  Patient will return 7/2/21 for next appointment.   Patient discharged in stable condition accompanied by: self.  Departure Mode: Ambulatory.      GLORIA YANCEY RN

## 2021-06-28 NOTE — TELEPHONE ENCOUNTER
[facility] Telephone Triage Note    The following symptoms were reported:   Typical  Description:            Onset:  Late last night around 9:00pm  Location: all over body  Character: Sharp           Intensity: 8/10    Accompanying Signs & Symptoms:  none    Chest Pain:  denies     Shortness of Breath:  denies     Fever:  denies     Chills:  denies   Cough/sore throat:  denies  Other:  Denies    Transportation neede 30-45minutes    Narcotic Refill Request    Medication(s) requested: Oxycodone 15mg  [facility] to [facility]  How is the medication being taken?: 1 tablet every 6 hours, 4 tablets/day  Does pt have enough for today? 1 left  Is pain being adequately controlled on the current regimen?: okay, continues to need ED/SS infusions  Experiencing any side effects from medication?: denies    Date of most recent appointment:  6/18 Dr. Duncan  Next appointment:   7/5 Andrei HIGGINS  Last fill date and by whom:  6/14 by Andrei Patiño   Reviewed: Not an agent.   Routed/Paged provider: Andrei HIGGINS        Therapies Tried and outcome: has tried heat, hot shower. Oxycodone, Oxycontin, Tylenol taken around 6:00am    Improved by: nothing    The following provider was consulted:    7:31am Andrei HIGGINS, refill for Oxycodone.   IVF/Pain okay, no labs needed.       The following advice/orders were given, and/or interventions recommended:  pendng appt availability    Patient instructions and/or follow up:

## 2021-06-29 ENCOUNTER — NURSE TRIAGE (OUTPATIENT)
Dept: NURSING | Facility: CLINIC | Age: 21
End: 2021-06-29

## 2021-06-29 NOTE — PROGRESS NOTES
This is a recent snapshot of the patient's [facility] medical record.  For current drug dose and complete information and questions, call [phone] or In Basket pool, [facility] home infusion ([id number])  CSN Number:  [id number]

## 2021-06-29 NOTE — PROGRESS NOTES
This is a recent snapshot of the patient's [facility] medical record.  For current drug dose and complete information and questions, call [phone] or In Basket pool, [facility]v home infusion ([id number])  CSN Number:  [id number]

## 2021-06-30 ENCOUNTER — HOSPITAL ENCOUNTER (EMERGENCY)
Facility: CLINIC | Age: 21
Discharge: HOME | End: 2021-06-30
Attending: EMERGENCY MEDICINE | Admitting: EMERGENCY MEDICINE
Payer: COMMERCIAL

## 2021-06-30 VITALS
DIASTOLIC BLOOD PRESSURE: 81 MMHG | TEMPERATURE: 98.4 F | WEIGHT: 170 LBS | SYSTOLIC BLOOD PRESSURE: 125 MMHG | RESPIRATION RATE: 16 BRPM | HEIGHT: 64 IN | OXYGEN SATURATION: 94 % | HEART RATE: 100 BPM | BODY MASS INDEX: 29.02 KG/M2

## 2021-06-30 DIAGNOSIS — D57.00 SICKLE CELL PAIN CRISIS (H): ICD-10-CM

## 2021-06-30 LAB — TRANSF REACT PLASRBC-IMP: NORMAL

## 2021-06-30 PROCEDURE — 99285 EMERGENCY DEPT VISIT HI MDM: CPT | Mod: 25

## 2021-06-30 PROCEDURE — 96376 TX/PRO/DX INJ SAME DRUG ADON: CPT

## 2021-06-30 PROCEDURE — 85045 AUTOMATED RETICULOCYTE COUNT: CPT | Performed by: EMERGENCY MEDICINE

## 2021-06-30 PROCEDURE — 99285 EMERGENCY DEPT VISIT HI MDM: CPT | Performed by: EMERGENCY MEDICINE

## 2021-06-30 PROCEDURE — 96374 THER/PROPH/DIAG INJ IV PUSH: CPT

## 2021-06-30 PROCEDURE — 258N000003 HC RX IP 258 OP 636: Performed by: EMERGENCY MEDICINE

## 2021-06-30 PROCEDURE — 250N000011 HC RX IP 250 OP 636: Performed by: EMERGENCY MEDICINE

## 2021-06-30 PROCEDURE — 96361 HYDRATE IV INFUSION ADD-ON: CPT

## 2021-06-30 RX ORDER — SODIUM CHLORIDE, SODIUM LACTATE, POTASSIUM CHLORIDE, CALCIUM CHLORIDE 600; 310; 30; 20 MG/100ML; MG/100ML; MG/100ML; MG/100ML
INJECTION, SOLUTION INTRAVENOUS CONTINUOUS
Status: DISCONTINUED | OUTPATIENT
Start: 2021-06-30 | End: 2021-06-30 | Stop reason: HOSPADM

## 2021-06-30 RX ORDER — MORPHINE SULFATE 2 MG/ML
2 INJECTION, SOLUTION INTRAMUSCULAR; INTRAVENOUS
Status: DISCONTINUED | OUTPATIENT
Start: 2021-06-30 | End: 2021-06-30 | Stop reason: HOSPADM

## 2021-06-30 RX ADMIN — MORPHINE SULFATE 2 MG: 2 INJECTION, SOLUTION INTRAMUSCULAR; INTRAVENOUS at 04:53

## 2021-06-30 RX ADMIN — MORPHINE SULFATE 2 MG: 2 INJECTION, SOLUTION INTRAMUSCULAR; INTRAVENOUS at 02:39

## 2021-06-30 RX ADMIN — SODIUM CHLORIDE, POTASSIUM CHLORIDE, SODIUM LACTATE AND CALCIUM CHLORIDE: 600; 310; 30; 20 INJECTION, SOLUTION INTRAVENOUS at 02:39

## 2021-06-30 RX ADMIN — MORPHINE SULFATE 2 MG: 2 INJECTION, SOLUTION INTRAMUSCULAR; INTRAVENOUS at 03:40

## 2021-06-30 ASSESSMENT — MIFFLIN-ST. JEOR: SCORE: 1516.11

## 2021-06-30 NOTE — ED PROVIDER NOTES
ED Provider Note  [facility]      History     Chief Complaint   Patient presents with     Sickle Cell Pain Crisis     HPI  Jennifer Cervantes is a 21 year old female who has a past medical history notable for sickle cell anemia, sickle cell pain crises, frequent emergency department presentations who presents to the emergency department from home with a complaint of leg pain.  Patient reports today she developed pain in both of her legs that she describes as a constant sharp pain.  Patient reports similar pain in the past with her sickle cell pain crises.  Patient states that she took her home medications oxycodone, OxyContin, and Tylenol with no improvement of the pain.  Patient denies any recent illnesses, denies any fever, chills, nausea, vomiting, chest pain, shortness of breath, weakness, tingling, numbness, no other complaints.  Patient has multiple emergency department visits recently (7 visits in 10 days).     Past Medical History  Past Medical History:   Diagnosis Date     Anxiety      Bleeding disorder (H)      Blood clotting disorder (H)      Cerebral infarction (H) 2015     Cognitive developmental delay     low IQ. 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 (H)     right hand contractures     Iron overload due to repeated red blood cell transfusions      Migraines      Multiple subsegmental pulmonary emboli without acute cor pulmonale (H) 02/01/2021     Oppositional defiant behavior      Superficial venous thrombosis of arm, right 03/25/2021     Uncomplicated asthma      Past Surgical History:   Procedure Laterality Date     AS INSERT TUNNELED CV 2 CATH W/O PORT/PUMP       C BREAST REDUCTION (INCLUDES LIPO) TIER 3 Bilateral 04/23/2019     CHOLECYSTECTOMY       INSERT PORT VASCULAR ACCESS Left 4/21/2021    Procedure: INSERTION, VASCULAR ACCESS PORT (NOT  SURE ON SIDE UNTIL REMOVAL);  Surgeon: Rajan More MD;  Location: [facility] OR     IR CHEST PORT PLACEMENT > 5 YRS OF AGE  4/21/2021     IR CVC NON TUNNEL LINE REMOVAL  5/6/2021     IR CVC NON TUNNEL PLACEMENT  04/07/2020     IR CVC NON TUNNEL PLACEMENT  4/30/2021     IR PORT REMOVAL LEFT  4/21/2021     REMOVE PORT VASCULAR ACCESS Left 4/21/2021    Procedure: REMOVAL, VASCULAR ACCESS PORT LEFT;  Surgeon: Rajan More MD;  Location: [facility] OR     REPAIR TENDON ELBOW Right 10/02/2019    Procedure: Right Elbow Flexor Lengthening, Flexor Pronator Slide Of Wrist and Finger, Thumb Adductor Lengthening;  Surgeon: Anai Franco MD;  Location: [facility] OR     TONSILLECTOMY Bilateral 10/02/2019    Procedure: Bilateral Tonsillectomy;  Surgeon: Farhana Guy MD;  Location: [facility] OR     acetaminophen (TYLENOL) 325 MG tablet  albuterol (PROAIR HFA/PROVENTIL HFA/VENTOLIN HFA) 108 (90 Base) MCG/ACT inhaler  albuterol (PROVENTIL) (2.5 MG/3ML) 0.083% neb solution  ARIPiprazole (ABILIFY) 2 MG tablet  budesonide-formoterol (SYMBICORT) 160-4.5 MCG/ACT Inhaler  diclofenac (VOLTAREN) 1 % topical gel  diphenhydrAMINE (BENADRYL) 25 MG capsule  EPINEPHrine (ANY BX GENERIC EQUIV) 0.3 MG/0.3ML injection 2-pack  Hydroxyurea 1000 MG TABS  hydrOXYzine (ATARAX) 25 MG tablet  JADENU 360 MG tablet  lidocaine-prilocaine (EMLA) 2.5-2.5 % external cream  medroxyPROGESTERone (DEPO-PROVERA) 150 MG/ML IM injection  naloxone (NARCAN) 4 MG/0.1ML nasal spray  ondansetron (ZOFRAN) 8 MG tablet  oxyCODONE (OXYCONTIN) 10 MG 12 hr tablet  oxyCODONE IR (ROXICODONE) 15 MG tablet  rivaroxaban ANTICOAGULANT (XARELTO ANTICOAGULANT) 20 MG TABS tablet  sertraline (ZOLOFT) 100 MG tablet      Allergies   Allergen Reactions     Contrast Dye      Hives and breathing issues     Fish-Derived Products Hives     Seafood Hives     Diagnostic X-Ray Materials      Gadolinium      Family History  Family History   Problem Relation Age of Onset     Sickle Cell Trait Mother  "     Hypertension Mother      Asthma Mother      Sickle Cell Trait Father      Social History   Social History     Tobacco Use     Smoking status: Never Smoker     Smokeless tobacco: Never Used   Substance Use Topics     Alcohol use: Not Currently     Alcohol/week: 0.0 standard drinks     Drug use: Never      Past medical history, past surgical history, medications, allergies, family history, and social history were reviewed with the patient. No additional pertinent items.       Review of Systems  Constitutional: Negative for fever.   HENT: Negative for congestion.    Eyes: Negative for visual disturbance.   Respiratory: Negative for shortness of breath.    Cardiovascular: Negative for chest pain.   Gastrointestinal: Negative for abdominal pain.   Endocrine: Negative for polyuria.   Genitourinary: Negative for dysuria.   Musculoskeletal: Positive for arthralgias, leg pain  Skin: Negative for rash.   Allergic/Immunologic: Positive for immunocompromised state.   Neurological: Negative for weakness.   Hematological: Does not bruise/bleed easily.   Psychiatric/Behavioral: Negative for confusion    Physical Exam   BP: 129/78  Pulse: 105  Temp: 99.6  F (37.6  C)  Resp: 17  Height: 162.6 cm (5' 4\")  Weight: 77.1 kg (170 lb)  SpO2: 94 %  Physical Exam  General: Afebrile, no acute distress   HEENT: Normocephalic, atraumatic, conjunctivae normal. MMM  Neck: non-tender, supple  Cardio: regular rate. regular rhythm   Resp: Normal work of breathing, no respiratory distress, lungs clear bilaterally, no wheezing, rhonchi, rales  Chest/Back: no visual signs of trauma, no CVA tenderness   Abdomen: soft, non distension, no tenderness, no peritoneal signs   Neuro: alert and fully oriented. CN II-XII grossly intact. Grossly normal strength and sensation in all extremities.   MSK: no deformities. Normal range of motion  Integumentary/Skin: no rash visualized, normal color  Psych: normal affect, normal behavior  ED Course    "   Procedures  No results found for any visits on 06/30/21.  Medications   morphine (PF) injection 2 mg (2 mg Intravenous Given 6/30/21 9053)   lactated ringers infusion ( Intravenous New Bag 6/30/21 5943)        Assessments & Plan (with Medical Decision Making)   Jennifer Cervantes is a 21 year old female who has a past medical history notable for sickle cell anemia, sickle cell pain crises, frequent emergency department presentations who presents to the emergency department from home with a complaint of leg pain. Upon arrival patient is well-appearing, afebrile, no distress.  Patient hemodynamically stable, blood pressure 129/78, mild tachycardia with heart rate 105, oxygen 94% on room air. Likely pain related to her sickle cell pain crisis. Patient with no other complaints such as fever, chest pain, shortness of breath to suggest acute chest syndrome, ACS, pulmonary embolism, infection.  At this time will plan for comprehensive labs, will treat per pain management protocol, and reevaluate.    Labs sent however somehow got lost and at this time patient resting comfortably, patient feeling better after her 3 doses of pain medication and IV hydration per pain management protocol.  I discussed this with patient and patient recently had labs a few days ago, no emergent need to repeat them at this time and patient is requesting to be discharged home.  Plan for discharge home, encourage her to follow-up closely with her primary care provider and hematology team for further evaluation and follow-up.  Return precautions discussed.  Patient understands agrees the plan.    I have reviewed the nursing notes. I have reviewed the findings, diagnosis, plan and need for follow up with the patient.    New Prescriptions    No medications on file       Final diagnoses:   Sickle cell pain crisis (H)       --  Jamee Martin MD  [facility] EMERGENCY DEPARTMENT  6/30/2021     Jamee Martin MD  06/30/21 0612

## 2021-06-30 NOTE — TELEPHONE ENCOUNTER
Pt is hoping to seek care in the ED tonight for body pain.  She is requesting to find out how busy [facility] ED is.      RN called [facility] ED, staff gave general information including that they are busy, beds are full and people waiting to be seen.      Patient verbalized understanding and had no further questions.      Erendira Hampton RN/CARINA    Reason for Disposition    Health Information question, no triage required and triager able to answer question    Additional Information    Negative: RN needs further essential information from caller in order to complete triage    Negative: Requesting regular office appointment    Negative: [1] Caller requesting NON-URGENT health information AND [2] PCP's office is the best resource    Protocols used: INFORMATION ONLY CALL - NO TRIAGE-A-

## 2021-06-30 NOTE — ED TRIAGE NOTES
Pt reports to the ER via cab with c/o of Sickle Cell Pain. Pt states that it started yesterday and has had trouble waling around. Pt then reports that its been getting worse so she reported to the ER.

## 2021-07-02 ENCOUNTER — APPOINTMENT (OUTPATIENT)
Dept: LAB | Facility: CLINIC | Age: 21
End: 2021-07-02
Attending: PEDIATRICS
Payer: COMMERCIAL

## 2021-07-02 ENCOUNTER — TELEPHONE (OUTPATIENT)
Dept: ONCOLOGY | Facility: CLINIC | Age: 21
End: 2021-07-02

## 2021-07-02 ENCOUNTER — INFUSION THERAPY VISIT (OUTPATIENT)
Dept: ONCOLOGY | Facility: CLINIC | Age: 21
End: 2021-07-02
Attending: PHYSICIAN ASSISTANT
Payer: COMMERCIAL

## 2021-07-02 VITALS
SYSTOLIC BLOOD PRESSURE: 116 MMHG | WEIGHT: 169.8 LBS | TEMPERATURE: 98.3 F | RESPIRATION RATE: 16 BRPM | OXYGEN SATURATION: 94 % | HEART RATE: 105 BPM | BODY MASS INDEX: 29.15 KG/M2 | DIASTOLIC BLOOD PRESSURE: 78 MMHG

## 2021-07-02 DIAGNOSIS — D57.00 SICKLE CELL CRISIS (H): ICD-10-CM

## 2021-07-02 DIAGNOSIS — D57.00 SICKLE CELL PAIN CRISIS (H): Primary | ICD-10-CM

## 2021-07-02 LAB
ANION GAP SERPL CALCULATED.3IONS-SCNC: 8 MMOL/L (ref 3–14)
BASOPHILS # BLD AUTO: 0 10E9/L (ref 0–0.2)
BASOPHILS NFR BLD AUTO: 0 %
BUN SERPL-MCNC: 8 MG/DL (ref 7–30)
CALCIUM SERPL-MCNC: 8.3 MG/DL (ref 8.5–10.1)
CHLORIDE SERPL-SCNC: 111 MMOL/L (ref 94–109)
CO2 SERPL-SCNC: 22 MMOL/L (ref 20–32)
CREAT SERPL-MCNC: 0.55 MG/DL (ref 0.52–1.04)
DIFFERENTIAL METHOD BLD: ABNORMAL
EOSINOPHIL # BLD AUTO: 0.3 10E9/L (ref 0–0.7)
EOSINOPHIL NFR BLD AUTO: 2 %
ERYTHROCYTE [DISTWIDTH] IN BLOOD BY AUTOMATED COUNT: 23.1 % (ref 10–15)
GFR SERPL CREATININE-BSD FRML MDRD: >90 ML/MIN/{1.73_M2}
GLUCOSE SERPL-MCNC: 109 MG/DL (ref 70–99)
HCT VFR BLD AUTO: 24.3 % (ref 35–47)
HGB BLD-MCNC: 8.8 G/DL (ref 11.7–15.7)
LYMPHOCYTES # BLD AUTO: 6.3 10E9/L (ref 0.8–5.3)
LYMPHOCYTES NFR BLD AUTO: 45 %
MCH RBC QN AUTO: 32.8 PG (ref 26.5–33)
MCHC RBC AUTO-ENTMCNC: 36.2 G/DL (ref 31.5–36.5)
MCV RBC AUTO: 91 FL (ref 78–100)
MONOCYTES # BLD AUTO: 0.8 10E9/L (ref 0–1.3)
MONOCYTES NFR BLD AUTO: 6 %
NEUTROPHILS # BLD AUTO: 6.6 10E9/L (ref 1.6–8.3)
NEUTROPHILS NFR BLD AUTO: 47 %
NRBC # BLD AUTO: 1.7 10*3/UL
NRBC BLD AUTO-RTO: 12 /100
PLATELET # BLD AUTO: 290 10E9/L (ref 150–450)
PLATELET # BLD EST: ABNORMAL 10*3/UL
POIKILOCYTOSIS BLD QL SMEAR: ABNORMAL
POLYCHROMASIA BLD QL SMEAR: SLIGHT
POTASSIUM SERPL-SCNC: 3.2 MMOL/L (ref 3.4–5.3)
RBC # BLD AUTO: 2.68 10E12/L (ref 3.8–5.2)
RETICS # AUTO: 335.5 10E9/L (ref 25–95)
RETICS/RBC NFR AUTO: 25.4 % (ref 0.5–2)
SICKLE CELLS BLD QL SMEAR: ABNORMAL
SODIUM SERPL-SCNC: 140 MMOL/L (ref 133–144)
WBC # BLD AUTO: 14 10E9/L (ref 4–11)

## 2021-07-02 PROCEDURE — 250N000011 HC RX IP 250 OP 636: Performed by: PEDIATRICS

## 2021-07-02 PROCEDURE — 250N000011 HC RX IP 250 OP 636: Performed by: PHYSICIAN ASSISTANT

## 2021-07-02 PROCEDURE — 96375 TX/PRO/DX INJ NEW DRUG ADDON: CPT

## 2021-07-02 PROCEDURE — 96376 TX/PRO/DX INJ SAME DRUG ADON: CPT

## 2021-07-02 PROCEDURE — 80048 BASIC METABOLIC PNL TOTAL CA: CPT | Performed by: PEDIATRICS

## 2021-07-02 PROCEDURE — 96361 HYDRATE IV INFUSION ADD-ON: CPT

## 2021-07-02 PROCEDURE — 85045 AUTOMATED RETICULOCYTE COUNT: CPT | Performed by: PEDIATRICS

## 2021-07-02 PROCEDURE — 96365 THER/PROPH/DIAG IV INF INIT: CPT

## 2021-07-02 PROCEDURE — 85025 COMPLETE CBC W/AUTO DIFF WBC: CPT | Performed by: PEDIATRICS

## 2021-07-02 PROCEDURE — 258N000003 HC RX IP 258 OP 636: Performed by: PEDIATRICS

## 2021-07-02 RX ORDER — HEPARIN SODIUM (PORCINE) LOCK FLUSH IV SOLN 100 UNIT/ML 100 UNIT/ML
5 SOLUTION INTRAVENOUS ONCE
Status: COMPLETED | OUTPATIENT
Start: 2021-07-02 | End: 2021-07-02

## 2021-07-02 RX ORDER — HEPARIN SODIUM,PORCINE 10 UNIT/ML
5 VIAL (ML) INTRAVENOUS
Status: CANCELLED | OUTPATIENT
Start: 2021-07-02

## 2021-07-02 RX ORDER — HEPARIN SODIUM (PORCINE) LOCK FLUSH IV SOLN 100 UNIT/ML 100 UNIT/ML
5 SOLUTION INTRAVENOUS
Status: DISCONTINUED | OUTPATIENT
Start: 2021-07-02 | End: 2021-07-02 | Stop reason: HOSPADM

## 2021-07-02 RX ORDER — ONDANSETRON 8 MG/1
8 TABLET, FILM COATED ORAL
Status: CANCELLED
Start: 2021-07-02

## 2021-07-02 RX ORDER — DIPHENHYDRAMINE HCL 25 MG
25 CAPSULE ORAL
Status: CANCELLED
Start: 2021-07-02

## 2021-07-02 RX ORDER — MORPHINE SULFATE 2 MG/ML
2 INJECTION, SOLUTION INTRAMUSCULAR; INTRAVENOUS
Status: CANCELLED
Start: 2021-07-02

## 2021-07-02 RX ORDER — DIPHENHYDRAMINE HCL 25 MG
25 CAPSULE ORAL
Status: DISCONTINUED | OUTPATIENT
Start: 2021-07-02 | End: 2021-07-02 | Stop reason: HOSPADM

## 2021-07-02 RX ORDER — MORPHINE SULFATE 2 MG/ML
2 INJECTION, SOLUTION INTRAMUSCULAR; INTRAVENOUS
Status: COMPLETED | OUTPATIENT
Start: 2021-07-02 | End: 2021-07-02

## 2021-07-02 RX ORDER — HEPARIN SODIUM (PORCINE) LOCK FLUSH IV SOLN 100 UNIT/ML 100 UNIT/ML
5 SOLUTION INTRAVENOUS
Status: CANCELLED | OUTPATIENT
Start: 2021-07-02

## 2021-07-02 RX ADMIN — MORPHINE SULFATE 2 MG: 2 INJECTION, SOLUTION INTRAMUSCULAR; INTRAVENOUS at 11:04

## 2021-07-02 RX ADMIN — SODIUM CHLORIDE 400 MG: 9 INJECTION, SOLUTION INTRAVENOUS at 09:19

## 2021-07-02 RX ADMIN — DEXTROSE AND SODIUM CHLORIDE 500 ML: 5; 450 INJECTION, SOLUTION INTRAVENOUS at 08:44

## 2021-07-02 RX ADMIN — SODIUM CHLORIDE 250 ML: 9 INJECTION, SOLUTION INTRAVENOUS at 09:19

## 2021-07-02 RX ADMIN — Medication 5 ML: at 08:01

## 2021-07-02 RX ADMIN — MORPHINE SULFATE 2 MG: 2 INJECTION, SOLUTION INTRAMUSCULAR; INTRAVENOUS at 09:45

## 2021-07-02 RX ADMIN — MORPHINE SULFATE 2 MG: 2 INJECTION, SOLUTION INTRAMUSCULAR; INTRAVENOUS at 08:45

## 2021-07-02 RX ADMIN — Medication 5 ML: at 12:50

## 2021-07-02 ASSESSMENT — PAIN SCALES - GENERAL: PAINLEVEL: EXTREME PAIN (9)

## 2021-07-02 NOTE — NURSING NOTE
Chief Complaint   Patient presents with     Port Draw     Labs drawn via port by RN in lab. VS taken.      Port accessed with 20 gauge 3/4 inch gripper needle and labs drawn by rn.  Port flushed with saline and heparin.  Pt tolerated well.  VS taken.  Pt checked in for next appt.    Melissa Wakefield RN

## 2021-07-02 NOTE — TELEPHONE ENCOUNTER
[facility] Telephone Triage Note    The following symptoms were reported:   Typical  Description:            Onset:  All week  Location: all over  Character: Sharp           Intensity: 8/10    Accompanying Signs & Symptoms:  none    Chest Pain:  denies     Shortness of Breath:  denies     Fever:  denies     Chills:  denies   Cough/sore throat:  denies  Other:  denies    Therapies Tried and outcome: Is scheduled for 8:00am infusion for Jr.  [place] Oxycodone, oxycontin and tylenol around 6:00am    Improved by:als tried heat, minimal relief    The following provider was consulted:  7:11am Andrei HIGGINS approved for IVF/Pain to be added to infusion plan for today.       The following advice/orders were given, and/or interventions recommended:  Requesting add on IVF/Pain to Jr infusion, reminded Jennifer to come by 7:30am for scheduled labs.     Patient instructions and/or follow up:   8:34am Per Infusion nurse Lina CID, able to add on IVF/Pain to current infusion plan for today.

## 2021-07-02 NOTE — PROGRESS NOTES
"Infusion Nursing Note:  Jennifer Cervantes presents today for Crizanlizumab, IV fluids and pain medications.    Patient seen by provider today: No   present during visit today: Not Applicable.    Note: Patient arrives with 9/10 all over pain greatest in her low back.  Patient reports this is her \"normal sickle cell pain.\"  She is hesitant to receive her Crizanlizumab infusion today, as she feels this has been making her pain worse.  She states she is willing to try \"a few more infusions\" and if it isn't helping she will stop them.  Per treatment plan, we are going to infuse the Criznlizumab slower today to see if that helps with her side effects.    After 3 doses of IV morphine, patient rated her pain at a 5/10.  Patient states she was feeling better than when she arrived, and was agreeable to be discharged from infusion.  Instructed patient to push fluids at home.  Also instructed her to go to the ED over the weekend if her pain again worsens.  Patient verbalized understanding.    Intravenous Access:  Implanted Port.    Treatment Conditions:  Lab Results   Component Value Date    HGB 8.8 07/02/2021     Lab Results   Component Value Date    WBC 14.0 07/02/2021      Lab Results   Component Value Date    ANEU 5.5 06/27/2021     Lab Results   Component Value Date     07/02/2021      Lab Results   Component Value Date     06/26/2021                   Lab Results   Component Value Date    POTASSIUM 4.2 06/26/2021           Lab Results   Component Value Date    MAG 1.7 02/21/2021            Lab Results   Component Value Date    CR 0.64 06/26/2021                   Lab Results   Component Value Date    MICAH 8.6 06/26/2021                Lab Results   Component Value Date    BILITOTAL 2.9 06/25/2021           Lab Results   Component Value Date    ALBUMIN 4.3 06/25/2021                    Lab Results   Component Value Date    ALT 85 06/25/2021           Lab Results   Component Value Date    AST 81 " 06/25/2021       Biological Infusion Checklist:  ~~~ NOTE: If the patient answers yes to any of the questions below, hold the infusion and contact ordering provider or on-call provider.    1. Have you recently had an elevated temperature, fever, chills, productive cough, coughing for 3 weeks or longer or hemoptysis, abnormal vital signs, night sweats,  chest pain or have you noticed a decrease in your appetite, unexplained weight loss or fatigue? No  2. Do you have any open wounds or new incisions? No  3. Do you have any recent or upcoming hospitalizations, surgeries or dental procedures? No  4. Do you currently have or recently have had any signs of illness or infection or are you on any antibiotics? No  5. Have you had any new, sudden or worsening abdominal pain? No  6. Have you or anyone in your household received a live vaccination in the past 4 weeks? Please note:  No live vaccines while on biologic/chemotherapy until 6 months after the last treatment.  Patient can receive the flu vaccine (shot only) and the pneumovax.  It is optimal for the patient to get these vaccines mid cycle, but they can be given at any time as long as it is not on the day of the infusion. No  7. Have you recently been diagnosed with any new nervous system diseases (ie. Multiple sclerosis, Guillain [place], seizures, neurological changes) or cancer diagnosis? No  8. Are you on any form of radiation or chemotherapy? No  9. Are you pregnant or breast feeding or do you have plans of pregnancy in the future? No  10. Have you been having any signs of worsening depression or suicidal ideations?  (benlysta only) No  11. Have there been any other new onset medical symptoms? No    Post Infusion Assessment:  Patient tolerated infusion without incident.  Blood return noted pre and post infusion.  Site patent and intact, free from redness, edema or discomfort.  No evidence of extravasations.  Access discontinued per protocol.    Discharge Plan:    Patient declined prescription refills.  Discharge instructions reviewed with: Patient.  Patient and/or family verbalized understanding of discharge instructions and all questions answered.  AVS to patient via [employer].  Patient will return 7/5/21 for next appointment.   Patient discharged in stable condition accompanied by: self.  Departure Mode: Ambulatory.  Face to Face time: 8 minutes.      LESA KEARNS RN

## 2021-07-03 ENCOUNTER — HOSPITAL ENCOUNTER (EMERGENCY)
Facility: CLINIC | Age: 21
Discharge: HOME | End: 2021-07-03
Attending: EMERGENCY MEDICINE | Admitting: EMERGENCY MEDICINE
Payer: COMMERCIAL

## 2021-07-03 VITALS
HEART RATE: 96 BPM | SYSTOLIC BLOOD PRESSURE: 100 MMHG | TEMPERATURE: 98.6 F | RESPIRATION RATE: 16 BRPM | OXYGEN SATURATION: 92 % | DIASTOLIC BLOOD PRESSURE: 57 MMHG

## 2021-07-03 DIAGNOSIS — D57.00 SICKLE CELL PAIN CRISIS (H): ICD-10-CM

## 2021-07-03 LAB
ALBUMIN SERPL-MCNC: 3.7 G/DL (ref 3.4–5)
ALBUMIN UR-MCNC: NEGATIVE MG/DL
ALP SERPL-CCNC: 65 U/L (ref 40–150)
ALT SERPL W P-5'-P-CCNC: 56 U/L (ref 0–50)
ANION GAP SERPL CALCULATED.3IONS-SCNC: 5 MMOL/L (ref 3–14)
ANISOCYTOSIS BLD QL SMEAR: ABNORMAL
APPEARANCE UR: CLEAR
AST SERPL W P-5'-P-CCNC: 51 U/L (ref 0–45)
BASOPHILS # BLD AUTO: 0.7 10E9/L (ref 0–0.2)
BASOPHILS NFR BLD AUTO: 4.6 %
BILIRUB SERPL-MCNC: 2.3 MG/DL (ref 0.2–1.3)
BILIRUB UR QL STRIP: NEGATIVE
BUN SERPL-MCNC: 9 MG/DL (ref 7–30)
CALCIUM SERPL-MCNC: 8.3 MG/DL (ref 8.5–10.1)
CHLORIDE SERPL-SCNC: 115 MMOL/L (ref 94–109)
CO2 SERPL-SCNC: 22 MMOL/L (ref 20–32)
COLOR UR AUTO: ABNORMAL
CREAT SERPL-MCNC: 0.55 MG/DL (ref 0.52–1.04)
DIFFERENTIAL METHOD BLD: ABNORMAL
ELLIPTOCYTES BLD QL SMEAR: SLIGHT
EOSINOPHIL # BLD AUTO: 2.3 10E9/L (ref 0–0.7)
EOSINOPHIL NFR BLD AUTO: 16.5 %
ERYTHROCYTE [DISTWIDTH] IN BLOOD BY AUTOMATED COUNT: 23.5 % (ref 10–15)
GFR SERPL CREATININE-BSD FRML MDRD: >90 ML/MIN/{1.73_M2}
GLUCOSE SERPL-MCNC: 110 MG/DL (ref 70–99)
GLUCOSE UR STRIP-MCNC: NEGATIVE MG/DL
HCT VFR BLD AUTO: 23 % (ref 35–47)
HGB BLD-MCNC: 8 G/DL (ref 11.7–15.7)
HGB UR QL STRIP: NEGATIVE
KETONES UR STRIP-MCNC: NEGATIVE MG/DL
LEUKOCYTE ESTERASE UR QL STRIP: NEGATIVE
LYMPHOCYTES # BLD AUTO: 3.3 10E9/L (ref 0.8–5.3)
LYMPHOCYTES NFR BLD AUTO: 22.9 %
MACROCYTES BLD QL SMEAR: PRESENT
MCH RBC QN AUTO: 31.6 PG (ref 26.5–33)
MCHC RBC AUTO-ENTMCNC: 34.8 G/DL (ref 31.5–36.5)
MCV RBC AUTO: 91 FL (ref 78–100)
MONOCYTES # BLD AUTO: 0.7 10E9/L (ref 0–1.3)
MONOCYTES NFR BLD AUTO: 4.6 %
MUCOUS THREADS #/AREA URNS LPF: PRESENT /LPF
NEUTROPHILS # BLD AUTO: 7.3 10E9/L (ref 1.6–8.3)
NEUTROPHILS NFR BLD AUTO: 51.4 %
NITRATE UR QL: NEGATIVE
NRBC # BLD AUTO: 1.6 10*3/UL
NRBC BLD AUTO-RTO: 11 /100
PH UR STRIP: 6.5 PH (ref 5–7)
PLATELET # BLD AUTO: 274 10E9/L (ref 150–450)
POIKILOCYTOSIS BLD QL SMEAR: ABNORMAL
POLYCHROMASIA BLD QL SMEAR: SLIGHT
POTASSIUM SERPL-SCNC: 3.6 MMOL/L (ref 3.4–5.3)
PROT SERPL-MCNC: 7.3 G/DL (ref 6.8–8.8)
RBC # BLD AUTO: 2.53 10E12/L (ref 3.8–5.2)
RBC #/AREA URNS AUTO: <1 /HPF (ref 0–2)
RETICS # AUTO: 660.6 10E9/L (ref 25–95)
RETICS/RBC NFR AUTO: 26.1 % (ref 0.5–2)
SICKLE CELLS BLD QL SMEAR: SLIGHT
SODIUM SERPL-SCNC: 142 MMOL/L (ref 133–144)
SOURCE: ABNORMAL
SP GR UR STRIP: 1.01 (ref 1–1.03)
SQUAMOUS #/AREA URNS AUTO: <1 /HPF (ref 0–1)
UROBILINOGEN UR STRIP-MCNC: NORMAL MG/DL (ref 0–2)
WBC # BLD AUTO: 14.2 10E9/L (ref 4–11)
WBC #/AREA URNS AUTO: 1 /HPF (ref 0–5)

## 2021-07-03 PROCEDURE — 80053 COMPREHEN METABOLIC PANEL: CPT | Performed by: EMERGENCY MEDICINE

## 2021-07-03 PROCEDURE — 96361 HYDRATE IV INFUSION ADD-ON: CPT | Performed by: EMERGENCY MEDICINE

## 2021-07-03 PROCEDURE — 250N000011 HC RX IP 250 OP 636: Performed by: EMERGENCY MEDICINE

## 2021-07-03 PROCEDURE — 96374 THER/PROPH/DIAG INJ IV PUSH: CPT | Performed by: EMERGENCY MEDICINE

## 2021-07-03 PROCEDURE — 250N000009 HC RX 250: Performed by: EMERGENCY MEDICINE

## 2021-07-03 PROCEDURE — 96376 TX/PRO/DX INJ SAME DRUG ADON: CPT | Performed by: EMERGENCY MEDICINE

## 2021-07-03 PROCEDURE — 250N000013 HC RX MED GY IP 250 OP 250 PS 637: Performed by: EMERGENCY MEDICINE

## 2021-07-03 PROCEDURE — 81001 URINALYSIS AUTO W/SCOPE: CPT | Performed by: EMERGENCY MEDICINE

## 2021-07-03 PROCEDURE — 99285 EMERGENCY DEPT VISIT HI MDM: CPT | Mod: 25 | Performed by: EMERGENCY MEDICINE

## 2021-07-03 PROCEDURE — 85025 COMPLETE CBC W/AUTO DIFF WBC: CPT | Performed by: EMERGENCY MEDICINE

## 2021-07-03 PROCEDURE — 99285 EMERGENCY DEPT VISIT HI MDM: CPT | Performed by: EMERGENCY MEDICINE

## 2021-07-03 PROCEDURE — 85045 AUTOMATED RETICULOCYTE COUNT: CPT | Performed by: EMERGENCY MEDICINE

## 2021-07-03 PROCEDURE — 258N000003 HC RX IP 258 OP 636: Performed by: EMERGENCY MEDICINE

## 2021-07-03 RX ORDER — MORPHINE SULFATE 2 MG/ML
2 INJECTION, SOLUTION INTRAMUSCULAR; INTRAVENOUS ONCE
Status: COMPLETED | OUTPATIENT
Start: 2021-07-03 | End: 2021-07-03

## 2021-07-03 RX ORDER — HEPARIN SODIUM (PORCINE) LOCK FLUSH IV SOLN 100 UNIT/ML 100 UNIT/ML
5 SOLUTION INTRAVENOUS ONCE
Status: COMPLETED | OUTPATIENT
Start: 2021-07-03 | End: 2021-07-03

## 2021-07-03 RX ADMIN — MORPHINE SULFATE 2 MG: 2 INJECTION, SOLUTION INTRAMUSCULAR; INTRAVENOUS at 04:14

## 2021-07-03 RX ADMIN — MORPHINE SULFATE 2 MG: 2 INJECTION, SOLUTION INTRAMUSCULAR; INTRAVENOUS at 02:11

## 2021-07-03 RX ADMIN — SODIUM CHLORIDE, POTASSIUM CHLORIDE, SODIUM LACTATE AND CALCIUM CHLORIDE 1000 ML: 600; 310; 30; 20 INJECTION, SOLUTION INTRAVENOUS at 02:11

## 2021-07-03 RX ADMIN — Medication 5 ML: at 06:49

## 2021-07-03 RX ADMIN — MORPHINE SULFATE 2 MG: 2 INJECTION, SOLUTION INTRAMUSCULAR; INTRAVENOUS at 06:21

## 2021-07-03 RX ADMIN — LIDOCAINE HYDROCHLORIDE: 20 SOLUTION ORAL; TOPICAL at 04:13

## 2021-07-03 NOTE — DISCHARGE INSTRUCTIONS
Follow up with your Hematology clinic next week. Return to the ER with any new or worsening symptoms or any other concerns.

## 2021-07-03 NOTE — ED PROVIDER NOTES
ED Provider Note  [facility]      History     Chief Complaint   Patient presents with     Sickle Cell Pain Crisis     HPI  Jennifer Cervantes is a 21 year old female with history of sickle cell who presents to the ED reporting sickle cell pain crisis.  She states she has been having some sickle cell pain which she described as whole body pain recently.  She states she went to have an infusion of Crizanlizumab yesterday, but a side effect of that sometimes that it can increase her pain.  She states that tonight she is now developed low back pain.  She denies any urinary symptoms.  She additionally states that she has some left upper quadrant pain which she only started to notice a couple of hours ago.  No fever, cough, shortness of breath, chest pain, nausea, vomiting, diarrhea.  She states the back pain is typical of her sickle cell pain crisis as is her whole body pain.  There is been no trauma.  No acute numbness tingling or weakness aside from her baseline.    Past Medical History  Past Medical History:   Diagnosis Date     Anxiety      Bleeding disorder (H)      Blood clotting disorder (H)      Cerebral infarction (H) 2015     Cognitive developmental delay     low IQ. 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 (H)     right hand contractures     Iron overload due to repeated red blood cell transfusions      Migraines      Multiple subsegmental pulmonary emboli without acute cor pulmonale (H) 02/01/2021     Oppositional defiant behavior      Superficial venous thrombosis of arm, right 03/25/2021     Uncomplicated asthma      Past Surgical History:   Procedure Laterality Date     AS INSERT TUNNELED CV 2 CATH W/O PORT/PUMP       C BREAST REDUCTION (INCLUDES LIPO) TIER 3 Bilateral 04/23/2019     CHOLECYSTECTOMY       INSERT PORT VASCULAR ACCESS Left 4/21/2021     Procedure: INSERTION, VASCULAR ACCESS PORT (NOT SURE ON SIDE UNTIL REMOVAL);  Surgeon: Rajan More MD;  Location: [facility] OR     IR CHEST PORT PLACEMENT > 5 YRS OF AGE  4/21/2021     IR CVC NON TUNNEL LINE REMOVAL  5/6/2021     IR CVC NON TUNNEL PLACEMENT  04/07/2020     IR CVC NON TUNNEL PLACEMENT  4/30/2021     IR PORT REMOVAL LEFT  4/21/2021     REMOVE PORT VASCULAR ACCESS Left 4/21/2021    Procedure: REMOVAL, VASCULAR ACCESS PORT LEFT;  Surgeon: Rajan More MD;  Location: [facility] OR     REPAIR TENDON ELBOW Right 10/02/2019    Procedure: Right Elbow Flexor Lengthening, Flexor Pronator Slide Of Wrist and Finger, Thumb Adductor Lengthening;  Surgeon: Anai Franco MD;  Location: [facility]     TONSILLECTOMY Bilateral 10/02/2019    Procedure: Bilateral Tonsillectomy;  Surgeon: Farhana Guy MD;  Location: [facility]     acetaminophen (TYLENOL) 325 MG tablet  albuterol (PROAIR HFA/PROVENTIL HFA/VENTOLIN HFA) 108 (90 Base) MCG/ACT inhaler  albuterol (PROVENTIL) (2.5 MG/3ML) 0.083% neb solution  ARIPiprazole (ABILIFY) 2 MG tablet  budesonide-formoterol (SYMBICORT) 160-4.5 MCG/ACT Inhaler  diclofenac (VOLTAREN) 1 % topical gel  diphenhydrAMINE (BENADRYL) 25 MG capsule  EPINEPHrine (ANY BX GENERIC EQUIV) 0.3 MG/0.3ML injection 2-pack  Hydroxyurea 1000 MG TABS  hydrOXYzine (ATARAX) 25 MG tablet  JADENU 360 MG tablet  lidocaine-prilocaine (EMLA) 2.5-2.5 % external cream  medroxyPROGESTERone (DEPO-PROVERA) 150 MG/ML IM injection  naloxone (NARCAN) 4 MG/0.1ML nasal spray  ondansetron (ZOFRAN) 8 MG tablet  oxyCODONE (OXYCONTIN) 10 MG 12 hr tablet  oxyCODONE IR (ROXICODONE) 15 MG tablet  rivaroxaban ANTICOAGULANT (XARELTO ANTICOAGULANT) 20 MG TABS tablet  sertraline (ZOLOFT) 100 MG tablet      Allergies   Allergen Reactions     Contrast Dye      Hives and breathing issues     Fish-Derived Products Hives     Seafood Hives     Diagnostic X-Ray Materials      Gadolinium      Family History  Family History   Problem  Relation Age of Onset     Sickle Cell Trait Mother      Hypertension Mother      Asthma Mother      Sickle Cell Trait Father      Social History   Social History     Tobacco Use     Smoking status: Never Smoker     Smokeless tobacco: Never Used   Substance Use Topics     Alcohol use: Not Currently     Alcohol/week: 0.0 standard drinks     Drug use: Never      Past medical history, past surgical history, medications, allergies, family history, and social history were reviewed with the patient. No additional pertinent items.       Review of Systems  A complete review of systems was performed with pertinent positives and negatives noted in the HPI, and all other systems negative.    Physical Exam   BP: 116/85  Pulse: 100  Temp: 98.6  F (37  C)  Resp: 16  SpO2: 100 %  Physical Exam  Constitutional:       General: She is not in acute distress.     Appearance: She is not diaphoretic.   HENT:      Head: Atraumatic.      Mouth/Throat:      Pharynx: No oropharyngeal exudate.   Eyes:      General: No scleral icterus.  Cardiovascular:      Heart sounds: Normal heart sounds.   Pulmonary:      Effort: No respiratory distress.      Breath sounds: Normal breath sounds.   Abdominal:      General: Bowel sounds are normal.      Palpations: Abdomen is soft.      Tenderness: There is abdominal tenderness (minimal LUQ tenderness without guarding).   Musculoskeletal:         General: No tenderness.   Skin:     General: Skin is warm.      Findings: No rash.         ED Course      Procedures               Results for orders placed or performed during the hospital encounter of 07/03/21   CBC with platelets differential     Status: Abnormal   Result Value Ref Range    WBC 14.2 (H) 4.0 - 11.0 10e9/L    RBC Count 2.53 (L) 3.8 - 5.2 10e12/L    Hemoglobin 8.0 (L) 11.7 - 15.7 g/dL    Hematocrit 23.0 (L) 35.0 - 47.0 %    MCV 91 78 - 100 fl    MCH 31.6 26.5 - 33.0 pg    MCHC 34.8 31.5 - 36.5 g/dL    RDW 23.5 (H) 10.0 - 15.0 %    Platelet Count 274  150 - 450 10e9/L    Diff Method Manual Differential     % Neutrophils 51.4 %    % Lymphocytes 22.9 %    % Monocytes 4.6 %    % Eosinophils 16.5 %    % Basophils 4.6 %    Nucleated RBCs 11 (H) 0 /100    Absolute Neutrophil 7.3 1.6 - 8.3 10e9/L    Absolute Lymphocytes 3.3 0.8 - 5.3 10e9/L    Absolute Monocytes 0.7 0.0 - 1.3 10e9/L    Absolute Eosinophils 2.3 (H) 0.0 - 0.7 10e9/L    Absolute Basophils 0.7 (H) 0.0 - 0.2 10e9/L    Absolute Nucleated RBC 1.6     Anisocytosis Moderate     Poikilocytosis Moderate     Polychromasia Slight     Sickle Cells Slight     Elliptocytes Slight     Macrocytes Present    Comprehensive metabolic panel     Status: Abnormal   Result Value Ref Range    Sodium 142 133 - 144 mmol/L    Potassium 3.6 3.4 - 5.3 mmol/L    Chloride 115 (H) 94 - 109 mmol/L    Carbon Dioxide 22 20 - 32 mmol/L    Anion Gap 5 3 - 14 mmol/L    Glucose 110 (H) 70 - 99 mg/dL    Urea Nitrogen 9 7 - 30 mg/dL    Creatinine 0.55 0.52 - 1.04 mg/dL    GFR Estimate >90 >60 mL/min/[1.73_m2]    GFR Estimate If Black >90 >60 mL/min/[1.73_m2]    Calcium 8.3 (L) 8.5 - 10.1 mg/dL    Bilirubin Total 2.3 (H) 0.2 - 1.3 mg/dL    Albumin 3.7 3.4 - 5.0 g/dL    Protein Total 7.3 6.8 - 8.8 g/dL    Alkaline Phosphatase 65 40 - 150 U/L    ALT 56 (H) 0 - 50 U/L    AST 51 (H) 0 - 45 U/L   UA with Microscopic     Status: Abnormal   Result Value Ref Range    Color Urine Light Yellow     Appearance Urine Clear     Glucose Urine Negative NEG^Negative mg/dL    Bilirubin Urine Negative NEG^Negative    Ketones Urine Negative NEG^Negative mg/dL    Specific Gravity Urine 1.008 1.003 - 1.035    Blood Urine Negative NEG^Negative    pH Urine 6.5 5.0 - 7.0 pH    Protein Albumin Urine Negative NEG^Negative mg/dL    Urobilinogen mg/dL Normal 0.0 - 2.0 mg/dL    Nitrite Urine Negative NEG^Negative    Leukocyte Esterase Urine Negative NEG^Negative    Source Clean catch urine     WBC Urine 1 0 - 5 /HPF    RBC Urine <1 0 - 2 /HPF    Squamous Epithelial /HPF Urine  <1 0 - 1 /HPF    Mucous Urine Present (A) NEG^Negative /LPF   Reticulocyte count     Status: Abnormal   Result Value Ref Range    % Retic 26.1 (H) 0.5 - 2.0 %    Absolute Retic 660.6 (H) 25 - 95 10e9/L     Medications   morphine (PF) injection 2 mg (2 mg Intravenous Given 7/3/21 0211)   lactated ringers BOLUS 1,000 mL (0 mLs Intravenous Stopped 7/3/21 0504)   morphine (PF) injection 2 mg (2 mg Intravenous Given 7/3/21 0414)   lidocaine (XYLOCAINE) 2 % 15 mL, alum & mag hydroxide-simethicone (MAALOX) 15 mL GI Cocktail ( Oral Given 7/3/21 0413)   morphine (PF) injection 2 mg (2 mg Intravenous Given 7/3/21 0621)   heparin 100 UNIT/ML injection 5 mL (5 mLs Intracatheter Given 7/3/21 0649)        Assessments & Plan (with Medical Decision Making)   Patient's body pain, including her back pain are not reproducible on exam.  She does have some mild left upper quadrant tenderness which she stated started right before coming in.  She does have very mild tenderness with palpation, no guarding at all.  She did get a GI cocktail as well as some morphine per her pain management plan, states she overall feels much improved.  She states she can still feel the left upper quadrant pain a little bit.  She has a nonsurgical abdomen and I do not think that it is worth exposing her to radiation to image her, as my suspicion for acute surgical abnormality or or other abnormality that would require acute intervention is low.  Labs are all slightly off from baseline, but not significantly.  White blood cell count is up to 14.2, yesterday 14.0.  Looks like she generally has been running little bit high recently.  Recent hemoglobin was 8.8, down to 8.0 today.  She generally runs between 8 and 9.  Reticulocyte count is up today to 660.6, up from 335.5.  Certainly does seem likely she is having a sickle cell flare.  She is feeling much better at this time though.  She states she does not wish to be admitted and feels she can discharge home.  I  did discuss her lab abnormalities with her and did encourage her to follow-up with her hematologist as soon as possible, return to the ER with any new or worsening symptoms, any other concerns.  She verbalizes understanding and is agreeable to the plan.    Dictation Disclaimer: Some of this Note has been completed with voice-recognition dictation software. Although errors are generally corrected real-time, there is the potential for a rare error to be present in the completed chart.      I have reviewed the nursing notes. I have reviewed the findings, diagnosis, plan and need for follow up with the patient.    Discharge Medication List as of 7/3/2021  6:39 AM          Final diagnoses:   Sickle cell pain crisis (H)       --  Court Ring  [facility] EMERGENCY DEPARTMENT  7/3/2021     Court Ring MD  07/03/21 0836

## 2021-07-04 ENCOUNTER — HOSPITAL ENCOUNTER (EMERGENCY)
Facility: CLINIC | Age: 21
Discharge: HOME | End: 2021-07-04
Attending: EMERGENCY MEDICINE | Admitting: EMERGENCY MEDICINE
Payer: COMMERCIAL

## 2021-07-04 VITALS
DIASTOLIC BLOOD PRESSURE: 82 MMHG | SYSTOLIC BLOOD PRESSURE: 134 MMHG | HEART RATE: 104 BPM | OXYGEN SATURATION: 95 % | TEMPERATURE: 98.4 F | RESPIRATION RATE: 18 BRPM

## 2021-07-04 DIAGNOSIS — D57.00 SICKLE CELL PAIN CRISIS (H): ICD-10-CM

## 2021-07-04 LAB
ALBUMIN SERPL-MCNC: 3.9 G/DL (ref 3.4–5)
ALP SERPL-CCNC: 64 U/L (ref 40–150)
ALT SERPL W P-5'-P-CCNC: 54 U/L (ref 0–50)
ANION GAP SERPL CALCULATED.3IONS-SCNC: 7 MMOL/L (ref 3–14)
AST SERPL W P-5'-P-CCNC: 50 U/L (ref 0–45)
BASOPHILS # BLD AUTO: 0.3 10E9/L (ref 0–0.2)
BASOPHILS NFR BLD AUTO: 1.9 %
BILIRUB SERPL-MCNC: 2.5 MG/DL (ref 0.2–1.3)
BUN SERPL-MCNC: 6 MG/DL (ref 7–30)
CALCIUM SERPL-MCNC: 8.6 MG/DL (ref 8.5–10.1)
CHLORIDE SERPL-SCNC: 110 MMOL/L (ref 94–109)
CO2 SERPL-SCNC: 22 MMOL/L (ref 20–32)
CREAT SERPL-MCNC: 0.53 MG/DL (ref 0.52–1.04)
DIFFERENTIAL METHOD BLD: ABNORMAL
EOSINOPHIL # BLD AUTO: 2.2 10E9/L (ref 0–0.7)
EOSINOPHIL NFR BLD AUTO: 14.5 %
ERYTHROCYTE [DISTWIDTH] IN BLOOD BY AUTOMATED COUNT: 23.4 % (ref 10–15)
GFR SERPL CREATININE-BSD FRML MDRD: >90 ML/MIN/{1.73_M2}
GLUCOSE SERPL-MCNC: 83 MG/DL (ref 70–99)
HCT VFR BLD AUTO: 23.1 % (ref 35–47)
HGB BLD-MCNC: 8.2 G/DL (ref 11.7–15.7)
IMM GRANULOCYTES # BLD: 0.1 10E9/L (ref 0–0.4)
IMM GRANULOCYTES NFR BLD: 0.5 %
LACTATE BLD-SCNC: 0.8 MMOL/L (ref 0.7–2)
LYMPHOCYTES # BLD AUTO: 3.7 10E9/L (ref 0.8–5.3)
LYMPHOCYTES NFR BLD AUTO: 24.2 %
MCH RBC QN AUTO: 32.3 PG (ref 26.5–33)
MCHC RBC AUTO-ENTMCNC: 35.5 G/DL (ref 31.5–36.5)
MCV RBC AUTO: 91 FL (ref 78–100)
MONOCYTES # BLD AUTO: 1.1 10E9/L (ref 0–1.3)
MONOCYTES NFR BLD AUTO: 7 %
NEUTROPHILS # BLD AUTO: 7.8 10E9/L (ref 1.6–8.3)
NEUTROPHILS NFR BLD AUTO: 51.9 %
NRBC # BLD AUTO: 0.6 10*3/UL
NRBC BLD AUTO-RTO: 4 /100
PLATELET # BLD AUTO: 279 10E9/L (ref 150–450)
POTASSIUM SERPL-SCNC: 3.4 MMOL/L (ref 3.4–5.3)
PROT SERPL-MCNC: 7.6 G/DL (ref 6.8–8.8)
RBC # BLD AUTO: 2.54 10E12/L (ref 3.8–5.2)
SODIUM SERPL-SCNC: 138 MMOL/L (ref 133–144)
WBC # BLD AUTO: 15.1 10E9/L (ref 4–11)

## 2021-07-04 PROCEDURE — 99284 EMERGENCY DEPT VISIT MOD MDM: CPT | Performed by: EMERGENCY MEDICINE

## 2021-07-04 PROCEDURE — 85025 COMPLETE CBC W/AUTO DIFF WBC: CPT | Performed by: EMERGENCY MEDICINE

## 2021-07-04 PROCEDURE — 96374 THER/PROPH/DIAG INJ IV PUSH: CPT | Performed by: EMERGENCY MEDICINE

## 2021-07-04 PROCEDURE — 99285 EMERGENCY DEPT VISIT HI MDM: CPT | Mod: 25 | Performed by: EMERGENCY MEDICINE

## 2021-07-04 PROCEDURE — 83605 ASSAY OF LACTIC ACID: CPT | Performed by: EMERGENCY MEDICINE

## 2021-07-04 PROCEDURE — 258N000003 HC RX IP 258 OP 636: Performed by: EMERGENCY MEDICINE

## 2021-07-04 PROCEDURE — 250N000011 HC RX IP 250 OP 636: Performed by: EMERGENCY MEDICINE

## 2021-07-04 PROCEDURE — 80053 COMPREHEN METABOLIC PANEL: CPT | Performed by: EMERGENCY MEDICINE

## 2021-07-04 RX ORDER — MORPHINE SULFATE 2 MG/ML
2 INJECTION, SOLUTION INTRAMUSCULAR; INTRAVENOUS ONCE
Status: COMPLETED | OUTPATIENT
Start: 2021-07-04 | End: 2021-07-04

## 2021-07-04 RX ORDER — HEPARIN SODIUM (PORCINE) LOCK FLUSH IV SOLN 100 UNIT/ML 100 UNIT/ML
5 SOLUTION INTRAVENOUS
Status: DISCONTINUED | OUTPATIENT
Start: 2021-07-04 | End: 2021-07-04 | Stop reason: HOSPADM

## 2021-07-04 RX ADMIN — SODIUM CHLORIDE, POTASSIUM CHLORIDE, SODIUM LACTATE AND CALCIUM CHLORIDE 1000 ML: 600; 310; 30; 20 INJECTION, SOLUTION INTRAVENOUS at 05:15

## 2021-07-04 RX ADMIN — MORPHINE SULFATE 2 MG: 2 INJECTION, SOLUTION INTRAMUSCULAR; INTRAVENOUS at 05:16

## 2021-07-04 NOTE — ED TRIAGE NOTES
Patient arrives ambulatory to triage with c/o sickle cell pain crisis. Patient reports pain to the lower back and generalized. Patient reports taking Oxycodone, tylenol and OxyContin two hours prior to arrival.

## 2021-07-04 NOTE — ED PROVIDER NOTES
ED Provider Note  [facility]      History     Chief Complaint   Patient presents with     Sickle Cell Pain Crisis     HPI  Jennifer Cervantes is a 21 year old female with sickle cell disease including multiple complications from that, who presents to the ED stating that she is having a sickle cell pain crisis.  I did see her last night with crisis as well.  She actually states that she felt so much better after going home, had a really good day, felt that her pain was well controlled throughout the day and eating.  She states that about an hour prior to arrival her low back pain started to worsen, and she developed pain in the left arm.  No trauma.  She states she took a warm shower but it did not help.  She states that the abdominal pain she had yesterday is now gone.  No dysuria.  No fever, cough, shortness of breath, abdominal pain.  No new numbness or tingling, weakness aside from her baseline.    Past Medical History  Past Medical History:   Diagnosis Date     Anxiety      Bleeding disorder (H)      Blood clotting disorder (H)      Cerebral infarction (H) 2015     Cognitive developmental delay     low IQ. 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 (H)     right hand contractures     Iron overload due to repeated red blood cell transfusions      Migraines      Multiple subsegmental pulmonary emboli without acute cor pulmonale (H) 02/01/2021     Oppositional defiant behavior      Superficial venous thrombosis of arm, right 03/25/2021     Uncomplicated asthma      Past Surgical History:   Procedure Laterality Date     AS INSERT TUNNELED CV 2 CATH W/O PORT/PUMP       C BREAST REDUCTION (INCLUDES LIPO) TIER 3 Bilateral 04/23/2019     CHOLECYSTECTOMY       INSERT PORT VASCULAR ACCESS Left 4/21/2021    Procedure: INSERTION, VASCULAR ACCESS PORT (NOT SURE ON SIDE  UNTIL REMOVAL);  Surgeon: Rajan More MD;  Location: [facility] OR     IR CHEST PORT PLACEMENT > 5 YRS OF AGE  4/21/2021     IR CVC NON TUNNEL LINE REMOVAL  5/6/2021     IR CVC NON TUNNEL PLACEMENT  04/07/2020     IR CVC NON TUNNEL PLACEMENT  4/30/2021     IR PORT REMOVAL LEFT  4/21/2021     REMOVE PORT VASCULAR ACCESS Left 4/21/2021    Procedure: REMOVAL, VASCULAR ACCESS PORT LEFT;  Surgeon: Rajan More MD;  Location: [facility] OR     REPAIR TENDON ELBOW Right 10/02/2019    Procedure: Right Elbow Flexor Lengthening, Flexor Pronator Slide Of Wrist and Finger, Thumb Adductor Lengthening;  Surgeon: Anai Franco MD;  [facility]: [facility]     TONSILLECTOMY Bilateral 10/02/2019    Procedure: Bilateral Tonsillectomy;  Surgeon: Farhana Guy MD;  Location: [facility] OR     acetaminophen (TYLENOL) 325 MG tablet  albuterol (PROAIR HFA/PROVENTIL HFA/VENTOLIN HFA) 108 (90 Base) MCG/ACT inhaler  albuterol (PROVENTIL) (2.5 MG/3ML) 0.083% neb solution  ARIPiprazole (ABILIFY) 2 MG tablet  budesonide-formoterol (SYMBICORT) 160-4.5 MCG/ACT Inhaler  diclofenac (VOLTAREN) 1 % topical gel  diphenhydrAMINE (BENADRYL) 25 MG capsule  EPINEPHrine (ANY BX GENERIC EQUIV) 0.3 MG/0.3ML injection 2-pack  Hydroxyurea 1000 MG TABS  hydrOXYzine (ATARAX) 25 MG tablet  JADENU 360 MG tablet  lidocaine-prilocaine (EMLA) 2.5-2.5 % external cream  medroxyPROGESTERone (DEPO-PROVERA) 150 MG/ML IM injection  naloxone (NARCAN) 4 MG/0.1ML nasal spray  ondansetron (ZOFRAN) 8 MG tablet  oxyCODONE (OXYCONTIN) 10 MG 12 hr tablet  oxyCODONE IR (ROXICODONE) 15 MG tablet  rivaroxaban ANTICOAGULANT (XARELTO ANTICOAGULANT) 20 MG TABS tablet  sertraline (ZOLOFT) 100 MG tablet      Allergies   Allergen Reactions     Contrast Dye      Hives and breathing issues     Fish-Derived Products Hives     Seafood Hives     Diagnostic X-Ray Materials      Gadolinium      Family History  Family History   Problem Relation Age of Onset     Sickle Cell Trait Mother       Hypertension Mother      Asthma Mother      Sickle Cell Trait Father      Social History   Social History     Tobacco Use     Smoking status: Never Smoker     Smokeless tobacco: Never Used   Substance Use Topics     Alcohol use: Not Currently     Alcohol/week: 0.0 standard drinks     Drug use: Never      Past medical history, past surgical history, medications, allergies, family history, and social history were reviewed with the patient. No additional pertinent items.       Review of Systems  A complete review of systems was performed with pertinent positives and negatives noted in the HPI, and all other systems negative.    Physical Exam   BP: 134/82  Pulse: 104  Temp: 98.4  F (36.9  C)  Resp: 18  SpO2: 93 %  Physical Exam  Constitutional:       General: She is not in acute distress.     Appearance: She is not diaphoretic.   HENT:      Head: Atraumatic.   Eyes:      General: No scleral icterus.  Cardiovascular:      Heart sounds: Normal heart sounds.   Pulmonary:      Effort: No respiratory distress.      Breath sounds: Normal breath sounds.   Abdominal:      Palpations: Abdomen is soft.      Tenderness: There is no abdominal tenderness.   Musculoskeletal:         General: No tenderness.   Skin:     General: Skin is warm.      Findings: No rash.   Neurological:      Comments: Chronic right arm weakness, held in flexion at the elbow. Chronic right leg weakness.          ED Course      Procedures               Results for orders placed or performed during the hospital encounter of 07/04/21   Lactate for Sepsis Protocol     Status: None   Result Value Ref Range    Lactate for Sepsis Protocol 0.8 0.7 - 2.0 mmol/L   CBC with platelets differential     Status: Abnormal   Result Value Ref Range    WBC 15.1 (H) 4.0 - 11.0 10e9/L    RBC Count 2.54 (L) 3.8 - 5.2 10e12/L    Hemoglobin 8.2 (L) 11.7 - 15.7 g/dL    Hematocrit 23.1 (L) 35.0 - 47.0 %    MCV 91 78 - 100 fl    MCH 32.3 26.5 - 33.0 pg    MCHC 35.5 31.5 - 36.5 g/dL     RDW 23.4 (H) 10.0 - 15.0 %    Platelet Count 279 150 - 450 10e9/L    Diff Method Automated Method     % Neutrophils 51.9 %    % Lymphocytes 24.2 %    % Monocytes 7.0 %    % Eosinophils 14.5 %    % Basophils 1.9 %    % Immature Granulocytes 0.5 %    Nucleated RBCs 4 (H) 0 /100    Absolute Neutrophil 7.8 1.6 - 8.3 10e9/L    Absolute Lymphocytes 3.7 0.8 - 5.3 10e9/L    Absolute Monocytes 1.1 0.0 - 1.3 10e9/L    Absolute Eosinophils 2.2 (H) 0.0 - 0.7 10e9/L    Absolute Basophils 0.3 (H) 0.0 - 0.2 10e9/L    Abs Immature Granulocytes 0.1 0 - 0.4 10e9/L    Absolute Nucleated RBC 0.6    Comprehensive metabolic panel     Status: Abnormal   Result Value Ref Range    Sodium 138 133 - 144 mmol/L    Potassium 3.4 3.4 - 5.3 mmol/L    Chloride 110 (H) 94 - 109 mmol/L    Carbon Dioxide 22 20 - 32 mmol/L    Anion Gap 7 3 - 14 mmol/L    Glucose 83 70 - 99 mg/dL    Urea Nitrogen 6 (L) 7 - 30 mg/dL    Creatinine 0.53 0.52 - 1.04 mg/dL    GFR Estimate >90 >60 mL/min/[1.73_m2]    GFR Estimate If Black >90 >60 mL/min/[1.73_m2]    Calcium 8.6 8.5 - 10.1 mg/dL    Bilirubin Total 2.5 (H) 0.2 - 1.3 mg/dL    Albumin 3.9 3.4 - 5.0 g/dL    Protein Total 7.6 6.8 - 8.8 g/dL    Alkaline Phosphatase 64 40 - 150 U/L    ALT 54 (H) 0 - 50 U/L    AST 50 (H) 0 - 45 U/L     Medications   heparin 100 UNIT/ML injection 5 mL (has no administration in time range)   lactated ringers BOLUS 1,000 mL (1,000 mLs Intravenous New Bag 7/4/21 0515)   morphine (PF) injection 2 mg (2 mg Intravenous Given 7/4/21 0516)        Assessments & Plan (with Medical Decision Making)   The patient yesterday with similar complaints, though today her abdominal pain is gone and she has new pain in the left arm.  No fever or other infectious signs or symptoms.  White blood cell count did go up slightly today, though hemoglobin did as well.  She tends to run on the high side, and again there is no clear evidence to suggest infection.  No chest pain or shortness of breath.  She was  given her first dose of pain medication per protocol.  Unfortunately I was involved in an emergency procedure with another patient which lasted longer than expected, and the patient became very upset that she did not receive her next dose of pain medication after the 1 hour brittany.  Immediately after walking out of that procedure I walked into her room to talk to her about it, but she was angry, put her hand up in the air and told me she did not want to talk to me and that she was leaving immediately.  I attempted to talk to her, attempted to explain what happened (and was told by our ERT that he had updated her as to the fact that I was in an emergency procedure more several times while waiting), but she continued to refuse to talk to me.  She refused to talk to her nurse or the charge nurse, so her port was deaccessed and she was allowed to leave.  She refused to sign any paperwork.  This is very unfortunate, though unfortunately despite our attempts to try to explain to her and offer her additional pain medication she left the department.  She refused to discuss her results.    Dictation Disclaimer: Some of this Note has been completed with voice-recognition dictation software. Although errors are generally corrected real-time, there is the potential for a rare error to be present in the completed chart.      I have reviewed the nursing notes. I have reviewed the findings, diagnosis, plan and need for follow up with the patient.    Discharge Medication List as of 7/4/2021  7:11 AM          Final diagnoses:   Sickle cell pain crisis (H)       --  Court Ring  [facility] EMERGENCY DEPARTMENT  7/4/2021     Court Ring MD  07/04/21 0712

## 2021-07-05 ENCOUNTER — HOSPITAL ENCOUNTER (EMERGENCY)
Facility: CLINIC | Age: 21
Discharge: HOME | End: 2021-07-05
Attending: EMERGENCY MEDICINE | Admitting: EMERGENCY MEDICINE
Payer: COMMERCIAL

## 2021-07-05 VITALS
DIASTOLIC BLOOD PRESSURE: 86 MMHG | SYSTOLIC BLOOD PRESSURE: 135 MMHG | RESPIRATION RATE: 16 BRPM | TEMPERATURE: 98.9 F | HEART RATE: 108 BPM

## 2021-07-05 DIAGNOSIS — D57.00 SICKLE CELL PAIN CRISIS (H): ICD-10-CM

## 2021-07-05 PROCEDURE — 96376 TX/PRO/DX INJ SAME DRUG ADON: CPT | Performed by: EMERGENCY MEDICINE

## 2021-07-05 PROCEDURE — 96361 HYDRATE IV INFUSION ADD-ON: CPT | Performed by: EMERGENCY MEDICINE

## 2021-07-05 PROCEDURE — 250N000011 HC RX IP 250 OP 636: Performed by: EMERGENCY MEDICINE

## 2021-07-05 PROCEDURE — 96374 THER/PROPH/DIAG INJ IV PUSH: CPT | Performed by: EMERGENCY MEDICINE

## 2021-07-05 PROCEDURE — 99285 EMERGENCY DEPT VISIT HI MDM: CPT | Mod: 25 | Performed by: EMERGENCY MEDICINE

## 2021-07-05 PROCEDURE — 99285 EMERGENCY DEPT VISIT HI MDM: CPT | Performed by: EMERGENCY MEDICINE

## 2021-07-05 PROCEDURE — 258N000003 HC RX IP 258 OP 636: Performed by: EMERGENCY MEDICINE

## 2021-07-05 RX ORDER — SODIUM CHLORIDE, SODIUM LACTATE, POTASSIUM CHLORIDE, CALCIUM CHLORIDE 600; 310; 30; 20 MG/100ML; MG/100ML; MG/100ML; MG/100ML
1000 INJECTION, SOLUTION INTRAVENOUS CONTINUOUS
Status: DISCONTINUED | OUTPATIENT
Start: 2021-07-05 | End: 2021-07-05 | Stop reason: HOSPADM

## 2021-07-05 RX ORDER — HEPARIN SODIUM,PORCINE 10 UNIT/ML
5-10 VIAL (ML) INTRAVENOUS EVERY 24 HOURS
Status: DISCONTINUED | OUTPATIENT
Start: 2021-07-05 | End: 2021-07-05 | Stop reason: HOSPADM

## 2021-07-05 RX ORDER — MORPHINE SULFATE 4 MG/ML
2 INJECTION, SOLUTION INTRAMUSCULAR; INTRAVENOUS
Status: COMPLETED | OUTPATIENT
Start: 2021-07-05 | End: 2021-07-05

## 2021-07-05 RX ADMIN — SODIUM CHLORIDE, POTASSIUM CHLORIDE, SODIUM LACTATE AND CALCIUM CHLORIDE 1000 ML: 600; 310; 30; 20 INJECTION, SOLUTION INTRAVENOUS at 03:24

## 2021-07-05 RX ADMIN — Medication 5 ML: at 06:10

## 2021-07-05 RX ADMIN — MORPHINE SULFATE 2 MG: 4 INJECTION INTRAVENOUS at 05:42

## 2021-07-05 RX ADMIN — MORPHINE SULFATE 2 MG: 4 INJECTION INTRAVENOUS at 03:24

## 2021-07-05 RX ADMIN — SODIUM CHLORIDE, POTASSIUM CHLORIDE, SODIUM LACTATE AND CALCIUM CHLORIDE 1000 ML: 600; 310; 30; 20 INJECTION, SOLUTION INTRAVENOUS at 04:27

## 2021-07-05 RX ADMIN — MORPHINE SULFATE 2 MG: 4 INJECTION INTRAVENOUS at 04:26

## 2021-07-05 NOTE — DISCHARGE INSTRUCTIONS
Please call your hematologist to discuss your current pain plan    Return to the emergency department if your symptoms worsen

## 2021-07-05 NOTE — ED PROVIDER NOTES
ED Provider Note  [facility]      History     Chief Complaint   Patient presents with     Sickle Cell Pain Crisis     HPI  Jennifer Cervantes is a 21 year old female who has a past medical history of sickle cell disease, status post cholecystectomy, history of CVA presenting with pain that is typical of her sickle cell pain.  It is in her low back.  This is not a different pain than usual.  No numbness, tingling, weakness in her legs.  No incontinence of bowel or bladder.  No loss of sensation between her legs.  No fevers.  No IV drug use.  No chest pain, shortness of breath or other illnesses.  No recent nausea, vomiting or diarrhea.  She is otherwise been feeling well and tolerating p.o. intake.  She was here yesterday, states that she was mistreated and left AGAINST MEDICAL ADVICE prior to getting her full pain medications    Past Medical History  Past Medical History:   Diagnosis Date     Anxiety      Bleeding disorder (H)      Blood clotting disorder (H)      Cerebral infarction (H) 2015     Cognitive developmental delay     low IQ. 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 (H)     right hand contractures     Iron overload due to repeated red blood cell transfusions      Migraines      Multiple subsegmental pulmonary emboli without acute cor pulmonale (H) 02/01/2021     Oppositional defiant behavior      Superficial venous thrombosis of arm, right 03/25/2021     Uncomplicated asthma      Past Surgical History:   Procedure Laterality Date     AS INSERT TUNNELED CV 2 CATH W/O PORT/PUMP       C BREAST REDUCTION (INCLUDES LIPO) TIER 3 Bilateral 04/23/2019     CHOLECYSTECTOMY       INSERT PORT VASCULAR ACCESS Left 4/21/2021    Procedure: INSERTION, VASCULAR ACCESS PORT (NOT SURE ON SIDE UNTIL REMOVAL);  Surgeon: Rajan More MD;  Location: [facility] [place] OR      CHEST  PORT PLACEMENT > 5 YRS OF AGE  4/21/2021     IR CVC NON TUNNEL LINE REMOVAL  5/6/2021     IR CVC NON TUNNEL PLACEMENT  04/07/2020     IR CVC NON TUNNEL PLACEMENT  4/30/2021     IR PORT REMOVAL LEFT  4/21/2021     REMOVE PORT VASCULAR ACCESS Left 4/21/2021    Procedure: REMOVAL, VASCULAR ACCESS PORT LEFT;  Surgeon: Rajan More MD;  Location: [facility] OR     REPAIR TENDON ELBOW Right 10/02/2019    Procedure: Right Elbow Flexor Lengthening, Flexor Pronator Slide Of Wrist and Finger, Thumb Adductor Lengthening;  Surgeon: Anai Franco MD;  [facility] OR     TONSILLECTOMY Bilateral 10/02/2019    Procedure: Bilateral Tonsillectomy;  Surgeon: Farhana Guy MD;  [facility] OR     acetaminophen (TYLENOL) 325 MG tablet  albuterol (PROAIR HFA/PROVENTIL HFA/VENTOLIN HFA) 108 (90 Base) MCG/ACT inhaler  albuterol (PROVENTIL) (2.5 MG/3ML) 0.083% neb solution  ARIPiprazole (ABILIFY) 2 MG tablet  budesonide-formoterol (SYMBICORT) 160-4.5 MCG/ACT Inhaler  diclofenac (VOLTAREN) 1 % topical gel  diphenhydrAMINE (BENADRYL) 25 MG capsule  EPINEPHrine (ANY BX GENERIC EQUIV) 0.3 MG/0.3ML injection 2-pack  Hydroxyurea 1000 MG TABS  hydrOXYzine (ATARAX) 25 MG tablet  JADENU 360 MG tablet  lidocaine-prilocaine (EMLA) 2.5-2.5 % external cream  medroxyPROGESTERone (DEPO-PROVERA) 150 MG/ML IM injection  naloxone (NARCAN) 4 MG/0.1ML nasal spray  ondansetron (ZOFRAN) 8 MG tablet  oxyCODONE (OXYCONTIN) 10 MG 12 hr tablet  oxyCODONE IR (ROXICODONE) 15 MG tablet  rivaroxaban ANTICOAGULANT (XARELTO ANTICOAGULANT) 20 MG TABS tablet  sertraline (ZOLOFT) 100 MG tablet      Allergies   Allergen Reactions     Contrast Dye      Hives and breathing issues     Fish-Derived Products Hives     Seafood Hives     Diagnostic X-Ray Materials      Gadolinium      Family History  Family History   Problem Relation Age of Onset     Sickle Cell Trait Mother      Hypertension Mother      Asthma Mother      Sickle Cell Trait Father      Social  History   Social History     Tobacco Use     Smoking status: Never Smoker     Smokeless tobacco: Never Used   Substance Use Topics     Alcohol use: Not Currently     Alcohol/week: 0.0 standard drinks     Drug use: Never      Past medical history, past surgical history, medications, allergies, family history, and social history were reviewed with the patient. No additional pertinent items.       Review of Systems  A complete review of systems was performed with pertinent positives and negatives noted in the HPI, and all other systems negative.    Physical Exam   BP: 135/86  Pulse: 108  Temp: 98.9  F (37.2  C)  Resp: 16  Physical Exam  Physical Exam   Constitutional: oriented to person, place, and time. appears well-developed and well-nourished.   HENT:   Head: Normocephalic and atraumatic.   Neck: Normal range of motion.   Pulmonary/Chest: Effort normal. No respiratory distress.   Cardiac: No murmurs, rubs, gallops. RRR.  Abdominal: Abdomen soft, nontender, nondistended. No rebound tenderness.  MSK: Long bones without deformity or evidence of trauma.  Nontender palpation throughout the thoracic, lumbar paraspinous muscles of the back.  Neurological: alert and oriented to person, place, and time. Sensation gross intact lower extremities.  Gait intact.  Skin: Skin is warm and dry.   Psychiatric:  normal mood and affect.  behavior is normal. Thought content normal.     ED Course      Procedures             No results found for any visits on 07/05/21.  Medications   morphine (PF) injection 2 mg (has no administration in time range)   lactated ringers BOLUS 1,000 mL (has no administration in time range)   lactated ringers infusion (has no administration in time range)        Assessments & Plan (with Medical Decision Making)   MDM  Patient presenting with sickle cell pain.  This is typical of her flares.  No signs of cauda equina, epidural abscess, epidural hematoma or other acute spine catastrophe on history or physical.   Will not do labs at this point, they are just done yesterday.  She has no new symptoms, no fevers or other concerns except for her normal sickle cell pain.  Pain medications and fluids ordered.    Re eval: Patient has improved pain after 3 doses of morphine per her pain plan.  No signs of acute chest on history or physical.  The patient is feeling better and she is tolerating p.o. intake.  Patient be discharged.  Recommended that she follow-up with her hematologist.     I have reviewed the nursing notes. I have reviewed the findings, diagnosis, plan and need for follow up with the patient.    New Prescriptions    No medications on file       Final diagnoses:   Sickle cell pain crisis (H)       --  Andrew Chou  [facility] EMERGENCY DEPARTMENT  7/5/2021     Andrew Chou MD  07/05/21 0524

## 2021-07-06 ENCOUNTER — PATIENT OUTREACH (OUTPATIENT)
Dept: CARE COORDINATION | Facility: CLINIC | Age: 21
End: 2021-07-06

## 2021-07-06 ENCOUNTER — INFUSION THERAPY VISIT (OUTPATIENT)
Dept: ONCOLOGY | Facility: CLINIC | Age: 21
End: 2021-07-06
Attending: PEDIATRICS
Payer: COMMERCIAL

## 2021-07-06 ENCOUNTER — NURSE TRIAGE (OUTPATIENT)
Dept: ONCOLOGY | Facility: CLINIC | Age: 21
End: 2021-07-06

## 2021-07-06 ENCOUNTER — TELEPHONE (OUTPATIENT)
Dept: ONCOLOGY | Facility: CLINIC | Age: 21
End: 2021-07-06

## 2021-07-06 ENCOUNTER — HOME INFUSION (PRE-WILLOW HOME INFUSION) (OUTPATIENT)
Dept: PHARMACY | Facility: CLINIC | Age: 21
End: 2021-07-06

## 2021-07-06 VITALS
OXYGEN SATURATION: 92 % | RESPIRATION RATE: 17 BRPM | SYSTOLIC BLOOD PRESSURE: 135 MMHG | DIASTOLIC BLOOD PRESSURE: 83 MMHG | HEART RATE: 120 BPM

## 2021-07-06 DIAGNOSIS — D57.00 SICKLE CELL PAIN CRISIS (H): Primary | ICD-10-CM

## 2021-07-06 PROCEDURE — 258N000003 HC RX IP 258 OP 636: Performed by: PEDIATRICS

## 2021-07-06 PROCEDURE — 96374 THER/PROPH/DIAG INJ IV PUSH: CPT

## 2021-07-06 PROCEDURE — 96376 TX/PRO/DX INJ SAME DRUG ADON: CPT

## 2021-07-06 PROCEDURE — 250N000011 HC RX IP 250 OP 636: Performed by: PEDIATRICS

## 2021-07-06 PROCEDURE — 96361 HYDRATE IV INFUSION ADD-ON: CPT

## 2021-07-06 RX ORDER — HEPARIN SODIUM (PORCINE) LOCK FLUSH IV SOLN 100 UNIT/ML 100 UNIT/ML
5 SOLUTION INTRAVENOUS
Status: DISCONTINUED | OUTPATIENT
Start: 2021-07-06 | End: 2021-07-06 | Stop reason: HOSPADM

## 2021-07-06 RX ORDER — ONDANSETRON 8 MG/1
8 TABLET, FILM COATED ORAL
Status: CANCELLED
Start: 2021-07-06

## 2021-07-06 RX ORDER — MORPHINE SULFATE 2 MG/ML
2 INJECTION, SOLUTION INTRAMUSCULAR; INTRAVENOUS
Status: CANCELLED
Start: 2021-07-06

## 2021-07-06 RX ORDER — DIPHENHYDRAMINE HCL 25 MG
25 CAPSULE ORAL
Status: CANCELLED
Start: 2021-07-06

## 2021-07-06 RX ORDER — MORPHINE SULFATE 2 MG/ML
2 INJECTION, SOLUTION INTRAMUSCULAR; INTRAVENOUS
Status: DISCONTINUED | OUTPATIENT
Start: 2021-07-06 | End: 2021-07-06 | Stop reason: HOSPADM

## 2021-07-06 RX ORDER — HEPARIN SODIUM (PORCINE) LOCK FLUSH IV SOLN 100 UNIT/ML 100 UNIT/ML
5 SOLUTION INTRAVENOUS
Status: CANCELLED | OUTPATIENT
Start: 2021-07-06

## 2021-07-06 RX ORDER — HEPARIN SODIUM,PORCINE 10 UNIT/ML
5 VIAL (ML) INTRAVENOUS
Status: CANCELLED | OUTPATIENT
Start: 2021-07-06

## 2021-07-06 RX ADMIN — MORPHINE SULFATE 2 MG: 2 INJECTION, SOLUTION INTRAMUSCULAR; INTRAVENOUS at 12:37

## 2021-07-06 RX ADMIN — DEXTROSE AND SODIUM CHLORIDE 500 ML: 5; 450 INJECTION, SOLUTION INTRAVENOUS at 12:34

## 2021-07-06 RX ADMIN — MORPHINE SULFATE 2 MG: 2 INJECTION, SOLUTION INTRAMUSCULAR; INTRAVENOUS at 14:42

## 2021-07-06 RX ADMIN — MORPHINE SULFATE 2 MG: 2 INJECTION, SOLUTION INTRAMUSCULAR; INTRAVENOUS at 13:29

## 2021-07-06 RX ADMIN — Medication 5 ML: at 15:26

## 2021-07-06 ASSESSMENT — PAIN SCALES - GENERAL: PAINLEVEL: EXTREME PAIN (9)

## 2021-07-06 NOTE — PROGRESS NOTES
Social Work Follow-Up  [facility]    Data/Intervention:  Patient Name:  Jennifer Cervantes  /Age:  2000 (21 year old)    Reason for Follow-Up:  Transportation    Collaborated With:    -Elroy Taylor RN   -Health [facility] Health Ride  -Pt    Intervention/Education/Resources Provided:  SW received phone call from Elroy Taylor RN asking to assist with pt's ride for today's appointment at 11:30am. SW set up ride through pt's insurance, [employer] ([phone]) will  pt around 10:45 am. Pt to call once they are done with their appointment. SW called pt and relayed them this information and pt verbalized understanding. SW checked in with pt to see how they have been doing since they last spoke and how they have been doing since they moved back home. Pt stated that they are doing good and the living situation has been okay. They stated that when they feel like it is too much to handle being at home they will get a hotel and stay there a for a few days to relax and relieve some stress. Pt expressed that they have been at home since they last spoke with SW. Pt was encouraged to reach out for any assistance they need.     Assessment/Plan:  SW will remain available as needed.  Previously provided patient/family with writer's contact information and availability.       CARLOS Chavez,[facility]  Hematology/Oncology Social Worker  Phone:[phone] Pager: [phone]

## 2021-07-06 NOTE — PROGRESS NOTES
Infusion Nursing Note:  Jennifer Cervantes presents today for IVF/ Pain medication.    Patient seen by provider today: No   present during visit today: Not Applicable.    Note: Jennifer arrives to infusion in sickle cell crisis rating her pain a 9/10- she describes this as a generalized full body pain. Denies any recent fevers, chills or signs of infection. Vitals stable. Pt denies any other symptoms or concerns today. IV Morphine given x3 with pain relief- after 3 doses pt rated her pain a 6/10, she states this is a tolerable pain level for her to discharge home at. Denies any lightheadedness, dizziness or shortness of breath.    Intravenous Access:  Implanted Port.    Treatment Conditions:  NA      Post Infusion Assessment:  Patient tolerated infusion without incident.  Blood return noted pre and post infusion.  Access discontinued per protocol.       Discharge Plan:   Patient declined prescription refills.  AVS to patient via [employer].  Patient will return 7/16 for next appointment.   Patient discharged in stable condition accompanied by: self.  Departure Mode: Ambulatory.  Face to Face time: 0.      Anne Molina RN

## 2021-07-06 NOTE — TELEPHONE ENCOUNTER
[facility] Triage    S: Sickle Cell Pain    B: Pt reports usual sickle cell pain--generalized throughout body; rates it 9/10.    Pt 15 min from clinic and needs ride.    Reviewed with pt that if we are unable to get her in today, and she doesn't get a call by 4 p.m., that she should go to ED if pain is intolerable.    A: Paged Andrei who approved pt for IVF and pain meds. Infusion can add on at 1:30. Scheduling notified. Call placed to SW to assist with arranging a ride for pt.    R: Pt to come in for IVF/pain meds at 11:30. SW will call to arrange ride and then call pt w/ride details.    Claudia Metcalf RN

## 2021-07-06 NOTE — TELEPHONE ENCOUNTER
"Pt requested an earlier apt with either Dr. Duncan or Andrei HIGGINS, she thought her next follow-up was 7/23 with Patricia HIGGINS but [facility] shows follow-up is 7/19 with Andrei. Pt stated she's been in the ED many times and last few times have felt \"mistreated\" by the nurses and doctors at the ED so she no longer wants to go to the ED if she cannot get into same day infusion when the clinic is open.     Asked pt for specific details and she declined stating she wants to talk to Andrei only, but ok waiting until 7/19 now that she knows the apt is with her. Today she is currently in infusion getting ivf pain meds. Writer did ask her to inform infusion nurses if her pain is not relieved after her maximum doses have been given so care team can be paged and plan can be made to either bring her back the next day in infusion or directly admit for pain so she does not need to go to ED later the same day, she verbalized understanding.  "

## 2021-07-08 ENCOUNTER — APPOINTMENT (OUTPATIENT)
Dept: ULTRASOUND IMAGING | Facility: CLINIC | Age: 21
End: 2021-07-08
Attending: EMERGENCY MEDICINE
Payer: COMMERCIAL

## 2021-07-08 ENCOUNTER — HOSPITAL ENCOUNTER (EMERGENCY)
Facility: CLINIC | Age: 21
Discharge: HOME | End: 2021-07-08
Attending: EMERGENCY MEDICINE | Admitting: EMERGENCY MEDICINE
Payer: COMMERCIAL

## 2021-07-08 VITALS
HEART RATE: 105 BPM | RESPIRATION RATE: 16 BRPM | OXYGEN SATURATION: 92 % | WEIGHT: 170 LBS | BODY MASS INDEX: 29.02 KG/M2 | SYSTOLIC BLOOD PRESSURE: 129 MMHG | DIASTOLIC BLOOD PRESSURE: 80 MMHG | HEIGHT: 64 IN | TEMPERATURE: 98.9 F

## 2021-07-08 DIAGNOSIS — D57.00 SICKLE CELL PAIN CRISIS (H): ICD-10-CM

## 2021-07-08 LAB
ALBUMIN SERPL-MCNC: 4.3 G/DL (ref 3.4–5)
ALP SERPL-CCNC: 73 U/L (ref 40–150)
ALT SERPL W P-5'-P-CCNC: 74 U/L (ref 0–50)
ANION GAP SERPL CALCULATED.3IONS-SCNC: 8 MMOL/L (ref 3–14)
AST SERPL W P-5'-P-CCNC: 82 U/L (ref 0–45)
BASOPHILS # BLD AUTO: 0.3 10E9/L (ref 0–0.2)
BASOPHILS NFR BLD AUTO: 1.7 %
BILIRUB SERPL-MCNC: 4 MG/DL (ref 0.2–1.3)
BUN SERPL-MCNC: 9 MG/DL (ref 7–30)
CALCIUM SERPL-MCNC: 9 MG/DL (ref 8.5–10.1)
CHLORIDE SERPL-SCNC: 107 MMOL/L (ref 94–109)
CO2 SERPL-SCNC: 22 MMOL/L (ref 20–32)
CREAT SERPL-MCNC: 0.59 MG/DL (ref 0.52–1.04)
DIFFERENTIAL METHOD BLD: ABNORMAL
EOSINOPHIL # BLD AUTO: 1.1 10E9/L (ref 0–0.7)
EOSINOPHIL NFR BLD AUTO: 6.1 %
ERYTHROCYTE [DISTWIDTH] IN BLOOD BY AUTOMATED COUNT: 23 % (ref 10–15)
GFR SERPL CREATININE-BSD FRML MDRD: >90 ML/MIN/{1.73_M2}
GLUCOSE SERPL-MCNC: 89 MG/DL (ref 70–99)
HCT VFR BLD AUTO: 22.7 % (ref 35–47)
HGB BLD-MCNC: 8.1 G/DL (ref 11.7–15.7)
IMM GRANULOCYTES # BLD: 0.1 10E9/L (ref 0–0.4)
IMM GRANULOCYTES NFR BLD: 0.6 %
LACTATE BLD-SCNC: 0.6 MMOL/L (ref 0.7–2)
LYMPHOCYTES # BLD AUTO: 3.4 10E9/L (ref 0.8–5.3)
LYMPHOCYTES NFR BLD AUTO: 18.6 %
MCH RBC QN AUTO: 31.5 PG (ref 26.5–33)
MCHC RBC AUTO-ENTMCNC: 35.7 G/DL (ref 31.5–36.5)
MCV RBC AUTO: 88 FL (ref 78–100)
MONOCYTES # BLD AUTO: 1.3 10E9/L (ref 0–1.3)
MONOCYTES NFR BLD AUTO: 7.4 %
NEUTROPHILS # BLD AUTO: 11.8 10E9/L (ref 1.6–8.3)
NEUTROPHILS NFR BLD AUTO: 65.6 %
NRBC # BLD AUTO: 0.6 10*3/UL
NRBC BLD AUTO-RTO: 3 /100
PLATELET # BLD AUTO: 298 10E9/L (ref 150–450)
POTASSIUM SERPL-SCNC: 3.6 MMOL/L (ref 3.4–5.3)
PROT SERPL-MCNC: 8.2 G/DL (ref 6.8–8.8)
RBC # BLD AUTO: 2.57 10E12/L (ref 3.8–5.2)
RETICS # AUTO: 642.2 10E9/L (ref 25–95)
RETICS/RBC NFR AUTO: 25 % (ref 0.5–2)
SODIUM SERPL-SCNC: 136 MMOL/L (ref 133–144)
WBC # BLD AUTO: 18 10E9/L (ref 4–11)

## 2021-07-08 PROCEDURE — 85025 COMPLETE CBC W/AUTO DIFF WBC: CPT | Performed by: EMERGENCY MEDICINE

## 2021-07-08 PROCEDURE — 76700 US EXAM ABDOM COMPLETE: CPT

## 2021-07-08 PROCEDURE — 258N000003 HC RX IP 258 OP 636: Performed by: EMERGENCY MEDICINE

## 2021-07-08 PROCEDURE — 96374 THER/PROPH/DIAG INJ IV PUSH: CPT | Performed by: EMERGENCY MEDICINE

## 2021-07-08 PROCEDURE — 96361 HYDRATE IV INFUSION ADD-ON: CPT | Performed by: EMERGENCY MEDICINE

## 2021-07-08 PROCEDURE — 250N000011 HC RX IP 250 OP 636: Performed by: EMERGENCY MEDICINE

## 2021-07-08 PROCEDURE — 83605 ASSAY OF LACTIC ACID: CPT | Performed by: EMERGENCY MEDICINE

## 2021-07-08 PROCEDURE — 80053 COMPREHEN METABOLIC PANEL: CPT | Performed by: EMERGENCY MEDICINE

## 2021-07-08 PROCEDURE — 96376 TX/PRO/DX INJ SAME DRUG ADON: CPT | Performed by: EMERGENCY MEDICINE

## 2021-07-08 PROCEDURE — 85045 AUTOMATED RETICULOCYTE COUNT: CPT | Performed by: EMERGENCY MEDICINE

## 2021-07-08 PROCEDURE — 99285 EMERGENCY DEPT VISIT HI MDM: CPT | Mod: 25 | Performed by: EMERGENCY MEDICINE

## 2021-07-08 PROCEDURE — 99285 EMERGENCY DEPT VISIT HI MDM: CPT | Performed by: EMERGENCY MEDICINE

## 2021-07-08 RX ORDER — HEPARIN SODIUM (PORCINE) LOCK FLUSH IV SOLN 100 UNIT/ML 100 UNIT/ML
5 SOLUTION INTRAVENOUS
Status: DISCONTINUED | OUTPATIENT
Start: 2021-07-08 | End: 2021-07-08 | Stop reason: HOSPADM

## 2021-07-08 RX ORDER — MORPHINE SULFATE 2 MG/ML
2 INJECTION, SOLUTION INTRAMUSCULAR; INTRAVENOUS
Status: DISCONTINUED | OUTPATIENT
Start: 2021-07-08 | End: 2021-07-08 | Stop reason: HOSPADM

## 2021-07-08 RX ORDER — HEPARIN SODIUM,PORCINE 10 UNIT/ML
5-10 VIAL (ML) INTRAVENOUS EVERY 24 HOURS
Status: DISCONTINUED | OUTPATIENT
Start: 2021-07-08 | End: 2021-07-08 | Stop reason: HOSPADM

## 2021-07-08 RX ORDER — SODIUM CHLORIDE, SODIUM LACTATE, POTASSIUM CHLORIDE, CALCIUM CHLORIDE 600; 310; 30; 20 MG/100ML; MG/100ML; MG/100ML; MG/100ML
INJECTION, SOLUTION INTRAVENOUS CONTINUOUS
Status: DISCONTINUED | OUTPATIENT
Start: 2021-07-08 | End: 2021-07-08 | Stop reason: HOSPADM

## 2021-07-08 RX ORDER — HEPARIN SODIUM,PORCINE 10 UNIT/ML
5-10 VIAL (ML) INTRAVENOUS
Status: DISCONTINUED | OUTPATIENT
Start: 2021-07-08 | End: 2021-07-08 | Stop reason: HOSPADM

## 2021-07-08 RX ADMIN — MORPHINE SULFATE 2 MG: 2 INJECTION, SOLUTION INTRAMUSCULAR; INTRAVENOUS at 04:41

## 2021-07-08 RX ADMIN — SODIUM CHLORIDE, POTASSIUM CHLORIDE, SODIUM LACTATE AND CALCIUM CHLORIDE: 600; 310; 30; 20 INJECTION, SOLUTION INTRAVENOUS at 03:40

## 2021-07-08 RX ADMIN — MORPHINE SULFATE 2 MG: 2 INJECTION, SOLUTION INTRAMUSCULAR; INTRAVENOUS at 03:40

## 2021-07-08 RX ADMIN — MORPHINE SULFATE 2 MG: 2 INJECTION, SOLUTION INTRAMUSCULAR; INTRAVENOUS at 05:40

## 2021-07-08 RX ADMIN — Medication 5 ML: at 06:57

## 2021-07-08 ASSESSMENT — MIFFLIN-ST. JEOR: SCORE: 1516.11

## 2021-07-08 NOTE — ED TRIAGE NOTES
Patient arrives with sickle cell pain crisis. Pain generalized throughout whole body but worse in abdomen.

## 2021-07-09 ENCOUNTER — HOME INFUSION (PRE-WILLOW HOME INFUSION) (OUTPATIENT)
Dept: PHARMACY | Facility: CLINIC | Age: 21
End: 2021-07-09

## 2021-07-09 DIAGNOSIS — D57.00 SICKLE CELL PAIN CRISIS (H): ICD-10-CM

## 2021-07-09 RX ORDER — OXYCODONE HCL 10 MG/1
10 TABLET, FILM COATED, EXTENDED RELEASE ORAL EVERY 12 HOURS
Qty: 60 TABLET | Refills: 0 | Status: ON HOLD | OUTPATIENT
Start: 2021-07-09 | End: 2021-07-25

## 2021-07-09 RX ORDER — OXYCODONE HYDROCHLORIDE 15 MG/1
TABLET ORAL
Qty: 60 TABLET | Refills: 0 | Status: SHIPPED | OUTPATIENT
Start: 2021-07-09 | End: 2021-08-06

## 2021-07-09 NOTE — TELEPHONE ENCOUNTER
..........Refill Request    Date of most recent appointment:  6/18/21  Next upcoming appointment:   7/19/21    Medication requested:  Oxycodone  Quantity:  60  Last fill date:  6/28/21  Person requesting refill:  Jennifer Cervantes  Notes:  Will be out of meds this weekend. Taking 4 pills/day.     Medication requested:  Oxycontin  Quantity:  60  Last fill date:  6/14/21  Person requesting refill:  Jennifer Cervantes  Notes:  Will be out of meds this weekend. Taking 2 pills/day.     Message routed to Dr. Duncan to sign for medication.  Provider paged and called back to say he would sign order for medication.    Claudia Metcalf RN

## 2021-07-10 ENCOUNTER — HOSPITAL ENCOUNTER (EMERGENCY)
Facility: CLINIC | Age: 21
Discharge: HOME | End: 2021-07-10
Attending: EMERGENCY MEDICINE | Admitting: EMERGENCY MEDICINE
Payer: COMMERCIAL

## 2021-07-10 ENCOUNTER — HOME INFUSION (PRE-WILLOW HOME INFUSION) (OUTPATIENT)
Dept: PHARMACY | Facility: CLINIC | Age: 21
End: 2021-07-10

## 2021-07-10 ENCOUNTER — APPOINTMENT (OUTPATIENT)
Dept: CT IMAGING | Facility: CLINIC | Age: 21
End: 2021-07-10
Attending: EMERGENCY MEDICINE
Payer: COMMERCIAL

## 2021-07-10 VITALS
SYSTOLIC BLOOD PRESSURE: 110 MMHG | HEART RATE: 92 BPM | RESPIRATION RATE: 14 BRPM | OXYGEN SATURATION: 100 % | TEMPERATURE: 98.6 F | DIASTOLIC BLOOD PRESSURE: 82 MMHG

## 2021-07-10 DIAGNOSIS — D57.00 SICKLE CELL PAIN CRISIS (H): ICD-10-CM

## 2021-07-10 LAB
ALBUMIN SERPL-MCNC: 4 G/DL (ref 3.4–5)
ALP SERPL-CCNC: 69 U/L (ref 40–150)
ALT SERPL W P-5'-P-CCNC: 70 U/L (ref 0–50)
ANION GAP SERPL CALCULATED.3IONS-SCNC: 6 MMOL/L (ref 3–14)
AST SERPL W P-5'-P-CCNC: 73 U/L (ref 0–45)
BASOPHILS # BLD AUTO: 0.2 10E9/L (ref 0–0.2)
BASOPHILS NFR BLD AUTO: 1.4 %
BILIRUB SERPL-MCNC: 3.6 MG/DL (ref 0.2–1.3)
BUN SERPL-MCNC: 9 MG/DL (ref 7–30)
CALCIUM SERPL-MCNC: 8.4 MG/DL (ref 8.5–10.1)
CHLORIDE SERPL-SCNC: 112 MMOL/L (ref 94–109)
CO2 SERPL-SCNC: 22 MMOL/L (ref 20–32)
CREAT SERPL-MCNC: 0.5 MG/DL (ref 0.52–1.04)
DIFFERENTIAL METHOD BLD: ABNORMAL
EOSINOPHIL # BLD AUTO: 1.8 10E9/L (ref 0–0.7)
EOSINOPHIL NFR BLD AUTO: 11.5 %
ERYTHROCYTE [DISTWIDTH] IN BLOOD BY AUTOMATED COUNT: 24 % (ref 10–15)
ERYTHROCYTE [SEDIMENTATION RATE] IN BLOOD BY WESTERGREN METHOD: 26 MM/H (ref 0–20)
GFR SERPL CREATININE-BSD FRML MDRD: >90 ML/MIN/{1.73_M2}
GLUCOSE SERPL-MCNC: 101 MG/DL (ref 70–99)
HCG SERPL QL: NEGATIVE
HCT VFR BLD AUTO: 22.2 % (ref 35–47)
HGB BLD-MCNC: 7.8 G/DL (ref 11.7–15.7)
IMM GRANULOCYTES # BLD: 0.1 10E9/L (ref 0–0.4)
IMM GRANULOCYTES NFR BLD: 0.6 %
LACTATE BLD-SCNC: 0.9 MMOL/L (ref 0.7–2)
LIPASE SERPL-CCNC: 145 U/L (ref 73–393)
LYMPHOCYTES # BLD AUTO: 3.9 10E9/L (ref 0.8–5.3)
LYMPHOCYTES NFR BLD AUTO: 24.6 %
MCH RBC QN AUTO: 32 PG (ref 26.5–33)
MCHC RBC AUTO-ENTMCNC: 35.1 G/DL (ref 31.5–36.5)
MCV RBC AUTO: 91 FL (ref 78–100)
MONOCYTES # BLD AUTO: 1.1 10E9/L (ref 0–1.3)
MONOCYTES NFR BLD AUTO: 7.1 %
NEUTROPHILS # BLD AUTO: 8.7 10E9/L (ref 1.6–8.3)
NEUTROPHILS NFR BLD AUTO: 54.8 %
NRBC # BLD AUTO: 0.6 10*3/UL
NRBC BLD AUTO-RTO: 4 /100
PLATELET # BLD AUTO: 290 10E9/L (ref 150–450)
POTASSIUM SERPL-SCNC: 3.9 MMOL/L (ref 3.4–5.3)
PROT SERPL-MCNC: 7.6 G/DL (ref 6.8–8.8)
RBC # BLD AUTO: 2.44 10E12/L (ref 3.8–5.2)
RETICS # AUTO: 680.5 10E9/L (ref 25–95)
RETICS/RBC NFR AUTO: 27.9 % (ref 0.5–2)
SODIUM SERPL-SCNC: 140 MMOL/L (ref 133–144)
WBC # BLD AUTO: 15.8 10E9/L (ref 4–11)

## 2021-07-10 PROCEDURE — 80053 COMPREHEN METABOLIC PANEL: CPT | Performed by: EMERGENCY MEDICINE

## 2021-07-10 PROCEDURE — 96375 TX/PRO/DX INJ NEW DRUG ADDON: CPT | Performed by: EMERGENCY MEDICINE

## 2021-07-10 PROCEDURE — 85025 COMPLETE CBC W/AUTO DIFF WBC: CPT | Performed by: EMERGENCY MEDICINE

## 2021-07-10 PROCEDURE — 84703 CHORIONIC GONADOTROPIN ASSAY: CPT | Performed by: EMERGENCY MEDICINE

## 2021-07-10 PROCEDURE — 96361 HYDRATE IV INFUSION ADD-ON: CPT | Performed by: EMERGENCY MEDICINE

## 2021-07-10 PROCEDURE — 83690 ASSAY OF LIPASE: CPT | Performed by: EMERGENCY MEDICINE

## 2021-07-10 PROCEDURE — 74176 CT ABD & PELVIS W/O CONTRAST: CPT | Mod: 26 | Performed by: RADIOLOGY

## 2021-07-10 PROCEDURE — 74176 CT ABD & PELVIS W/O CONTRAST: CPT

## 2021-07-10 PROCEDURE — 250N000011 HC RX IP 250 OP 636: Performed by: EMERGENCY MEDICINE

## 2021-07-10 PROCEDURE — 258N000003 HC RX IP 258 OP 636: Performed by: EMERGENCY MEDICINE

## 2021-07-10 PROCEDURE — 85045 AUTOMATED RETICULOCYTE COUNT: CPT | Performed by: EMERGENCY MEDICINE

## 2021-07-10 PROCEDURE — 99285 EMERGENCY DEPT VISIT HI MDM: CPT | Mod: 25 | Performed by: EMERGENCY MEDICINE

## 2021-07-10 PROCEDURE — 99285 EMERGENCY DEPT VISIT HI MDM: CPT | Performed by: EMERGENCY MEDICINE

## 2021-07-10 PROCEDURE — 85652 RBC SED RATE AUTOMATED: CPT | Performed by: EMERGENCY MEDICINE

## 2021-07-10 PROCEDURE — 83605 ASSAY OF LACTIC ACID: CPT | Performed by: EMERGENCY MEDICINE

## 2021-07-10 PROCEDURE — 96374 THER/PROPH/DIAG INJ IV PUSH: CPT | Performed by: EMERGENCY MEDICINE

## 2021-07-10 RX ORDER — MORPHINE SULFATE 2 MG/ML
2 INJECTION, SOLUTION INTRAMUSCULAR; INTRAVENOUS
Status: COMPLETED | OUTPATIENT
Start: 2021-07-10 | End: 2021-07-10

## 2021-07-10 RX ADMIN — MORPHINE SULFATE 2 MG: 2 INJECTION, SOLUTION INTRAMUSCULAR; INTRAVENOUS at 04:47

## 2021-07-10 RX ADMIN — SODIUM CHLORIDE, POTASSIUM CHLORIDE, SODIUM LACTATE AND CALCIUM CHLORIDE 1000 ML: 600; 310; 30; 20 INJECTION, SOLUTION INTRAVENOUS at 03:52

## 2021-07-10 RX ADMIN — MORPHINE SULFATE 2 MG: 2 INJECTION, SOLUTION INTRAMUSCULAR; INTRAVENOUS at 03:52

## 2021-07-10 RX ADMIN — MORPHINE SULFATE 2 MG: 2 INJECTION, SOLUTION INTRAMUSCULAR; INTRAVENOUS at 06:04

## 2021-07-10 ASSESSMENT — ENCOUNTER SYMPTOMS
SHORTNESS OF BREATH: 0
COUGH: 0
DYSURIA: 0
NAUSEA: 0
EYE REDNESS: 0
DIFFICULTY URINATING: 0
BACK PAIN: 1
NECK PAIN: 1
SLEEP DISTURBANCE: 0
CHILLS: 0
WEAKNESS: 0
SORE THROAT: 0
FEVER: 0
HEADACHES: 0
VOMITING: 0

## 2021-07-10 NOTE — ED PROVIDER NOTES
ED Provider Note  [facility]      History     Chief Complaint   Patient presents with     Sickle Cell Pain Crisis     HPI  Jennifer Cervantes is a 21 year old female with history of sickle cell anemia who presents to the emergency department with acute pain crisis.  Patient states that she has had diffuse pain throughout the day today.  She complains of pain primarily in her back.  She also has some pain in her neck.  She denies any headache.  She is anticoagulated on Xarelto for history of pulmonary embolism.  She denies any chiropractic care or recent trauma.  Patient reports some ongoing upper abdominal pain that was evaluated yesterday with labs and ultrasound.  She denies any nausea or vomiting.  She has been able to eat today without difficulty.  She denies any diarrhea or constipation.  Patient denies any dysuria, urgency, or frequency.    Past Medical History  Past Medical History:   Diagnosis Date     Anxiety      Bleeding disorder (H)      Blood clotting disorder (H)      Cerebral infarction (H) 2015     Cognitive developmental delay     low IQ. 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 (H)     right hand contractures     Iron overload due to repeated red blood cell transfusions      Migraines      Multiple subsegmental pulmonary emboli without acute cor pulmonale (H) 02/01/2021     Oppositional defiant behavior      Superficial venous thrombosis of arm, right 03/25/2021     Uncomplicated asthma      Past Surgical History:   Procedure Laterality Date     AS INSERT TUNNELED CV 2 CATH W/O PORT/PUMP       C BREAST REDUCTION (INCLUDES LIPO) TIER 3 Bilateral 04/23/2019     CHOLECYSTECTOMY       INSERT PORT VASCULAR ACCESS Left 4/21/2021    Procedure: INSERTION, VASCULAR ACCESS PORT (NOT SURE ON SIDE UNTIL REMOVAL);  Surgeon: Rajan More MD;  Location: [facility] [place]  OR     IR CHEST PORT PLACEMENT > 5 YRS OF AGE  4/21/2021     IR CVC NON TUNNEL LINE REMOVAL  5/6/2021     IR CVC NON TUNNEL PLACEMENT  04/07/2020     IR CVC NON TUNNEL PLACEMENT  4/30/2021     IR PORT REMOVAL LEFT  4/21/2021     REMOVE PORT VASCULAR ACCESS Left 4/21/2021    Procedure: REMOVAL, VASCULAR ACCESS PORT LEFT;  Surgeon: Rajan More MD;  Location: [facility] OR     REPAIR TENDON ELBOW Right 10/02/2019    Procedure: Right Elbow Flexor Lengthening, Flexor Pronator Slide Of Wrist and Finger, Thumb Adductor Lengthening;  Surgeon: Anai Franco MD;  [facility] OR     TONSILLECTOMY Bilateral 10/02/2019    Procedure: Bilateral Tonsillectomy;  Surgeon: Farhana Guy MD;  [facility] OR     acetaminophen (TYLENOL) 325 MG tablet  albuterol (PROAIR HFA/PROVENTIL HFA/VENTOLIN HFA) 108 (90 Base) MCG/ACT inhaler  albuterol (PROVENTIL) (2.5 MG/3ML) 0.083% neb solution  ARIPiprazole (ABILIFY) 2 MG tablet  budesonide-formoterol (SYMBICORT) 160-4.5 MCG/ACT Inhaler  diclofenac (VOLTAREN) 1 % topical gel  diphenhydrAMINE (BENADRYL) 25 MG capsule  EPINEPHrine (ANY BX GENERIC EQUIV) 0.3 MG/0.3ML injection 2-pack  Hydroxyurea 1000 MG TABS  hydrOXYzine (ATARAX) 25 MG tablet  JADENU 360 MG tablet  lidocaine-prilocaine (EMLA) 2.5-2.5 % external cream  medroxyPROGESTERone (DEPO-PROVERA) 150 MG/ML IM injection  naloxone (NARCAN) 4 MG/0.1ML nasal spray  ondansetron (ZOFRAN) 8 MG tablet  oxyCODONE (OXYCONTIN) 10 MG 12 hr tablet  oxyCODONE IR (ROXICODONE) 15 MG tablet  rivaroxaban ANTICOAGULANT (XARELTO ANTICOAGULANT) 20 MG TABS tablet  sertraline (ZOLOFT) 100 MG tablet      Allergies   Allergen Reactions     Contrast Dye      Hives and breathing issues     Fish-Derived Products Hives     Seafood Hives     Diagnostic X-Ray Materials      Gadolinium      Family History  Family History   Problem Relation Age of Onset     Sickle Cell Trait Mother      Hypertension Mother      Asthma Mother      Sickle Cell Trait  Father      Social History   Social History     Tobacco Use     Smoking status: Never Smoker     Smokeless tobacco: Never Used   Substance Use Topics     Alcohol use: Not Currently     Alcohol/week: 0.0 standard drinks     Drug use: Never      Past medical history, past surgical history, medications, allergies, family history, and social history were reviewed with the patient. No additional pertinent items.       Review of Systems   Constitutional: Negative for chills and fever.   HENT: Negative for sore throat.    Eyes: Negative for redness.   Respiratory: Negative for cough and shortness of breath.    Cardiovascular: Negative for chest pain.   Gastrointestinal: Negative for nausea and vomiting.   Genitourinary: Negative for difficulty urinating and dysuria.   Musculoskeletal: Positive for back pain and neck pain.   Skin: Negative for rash.   Neurological: Negative for weakness and headaches.   Psychiatric/Behavioral: Negative for sleep disturbance.   All other systems reviewed and are negative.    A complete review of systems was performed with pertinent positives and negatives noted in the HPI, and all other systems negative.    Physical Exam   BP: 139/81  Pulse: 110  Temp: 98.6  F (37  C)  Resp: 16  SpO2: 94 %  Physical Exam  Vitals signs and nursing note reviewed.   Constitutional:       General: She is in acute distress.      Appearance: Normal appearance. She is not diaphoretic.   HENT:      Head: Normocephalic and atraumatic.      Mouth/Throat:      Pharynx: No oropharyngeal exudate.   Eyes:      General: No scleral icterus.     Pupils: Pupils are equal, round, and reactive to light.   Neck:      Musculoskeletal: Normal range of motion. No neck rigidity or muscular tenderness.      Vascular: No carotid bruit.   Cardiovascular:      Rate and Rhythm: Regular rhythm. Tachycardia present.      Pulses: Normal pulses.      Heart sounds: Normal heart sounds.   Pulmonary:      Effort: Pulmonary effort is normal. No  respiratory distress.      Breath sounds: Normal breath sounds.   Abdominal:      General: Bowel sounds are normal.      Palpations: Abdomen is soft.      Tenderness: There is no abdominal tenderness.   Musculoskeletal: Normal range of motion.         General: No tenderness.   Lymphadenopathy:      Cervical: No cervical adenopathy.   Skin:     General: Skin is warm and dry.      Findings: No rash.   Neurological:      General: No focal deficit present.      Mental Status: She is alert.         ED Course      Procedures             Results for orders placed or performed during the hospital encounter of 07/10/21   CT Abdomen Pelvis w/o Contrast     Status: None    Narrative    EXAM: CT ABDOMEN AND PELVIS WITHOUT CONTRAST  LOCATION: [facility]  DATE/TIME: 07/10/2021, 6:45 AM    INDICATION: Abdominal pain.  COMPARISON: 06/04/2021.  TECHNIQUE: CT scan of the abdomen and pelvis was performed without IV contrast. Multiplanar reformats were obtained. Dose reduction techniques were used.  CONTRAST: None.    FINDINGS:   LOWER CHEST: Mild subpleural atelectasis in the lung bases.    HEPATOBILIARY: The liver is negative. Cholecystectomy. No biliary dilatation.    PANCREAS: Normal.    SPLEEN: Spleen is normal in size and shows some nonspecific heterogeneity. Appearance is unchanged from previous.    ADRENAL GLANDS: Normal.    KIDNEYS/BLADDER: Normal.    BOWEL: Normal.    LYMPH NODES: Normal.    VASCULATURE: Unremarkable.    PELVIC ORGANS: Normal.    MUSCULOSKELETAL: No significant bony abnormalities. Small fat-containing umbilical hernia.      Impression    IMPRESSION:   1.  No definite acute abnormalities or CT findings to explain abdominal pain.    2.  Normal appendix. No acute bowel findings.    3.  No urinary calculi or hydronephrosis.    4.  Cholecystectomy. No biliary dilatation.     Comprehensive metabolic panel     Status: Abnormal   Result Value Ref Range    Sodium 140 133 - 144 mmol/L    Potassium 3.9 3.4  - 5.3 mmol/L    Chloride 112 (H) 94 - 109 mmol/L    Carbon Dioxide 22 20 - 32 mmol/L    Anion Gap 6 3 - 14 mmol/L    Glucose 101 (H) 70 - 99 mg/dL    Urea Nitrogen 9 7 - 30 mg/dL    Creatinine 0.50 (L) 0.52 - 1.04 mg/dL    GFR Estimate >90 >60 mL/min/[1.73_m2]    GFR Estimate If Black >90 >60 mL/min/[1.73_m2]    Calcium 8.4 (L) 8.5 - 10.1 mg/dL    Bilirubin Total 3.6 (H) 0.2 - 1.3 mg/dL    Albumin 4.0 3.4 - 5.0 g/dL    Protein Total 7.6 6.8 - 8.8 g/dL    Alkaline Phosphatase 69 40 - 150 U/L    ALT 70 (H) 0 - 50 U/L    AST 73 (H) 0 - 45 U/L   CBC with platelets differential     Status: Abnormal   Result Value Ref Range    WBC 15.8 (H) 4.0 - 11.0 10e9/L    RBC Count 2.44 (L) 3.8 - 5.2 10e12/L    Hemoglobin 7.8 (L) 11.7 - 15.7 g/dL    Hematocrit 22.2 (L) 35.0 - 47.0 %    MCV 91 78 - 100 fl    MCH 32.0 26.5 - 33.0 pg    MCHC 35.1 31.5 - 36.5 g/dL    RDW 24.0 (H) 10.0 - 15.0 %    Platelet Count 290 150 - 450 10e9/L    Diff Method Automated Method     % Neutrophils 54.8 %    % Lymphocytes 24.6 %    % Monocytes 7.1 %    % Eosinophils 11.5 %    % Basophils 1.4 %    % Immature Granulocytes 0.6 %    Nucleated RBCs 4 (H) 0 /100    Absolute Neutrophil 8.7 (H) 1.6 - 8.3 10e9/L    Absolute Lymphocytes 3.9 0.8 - 5.3 10e9/L    Absolute Monocytes 1.1 0.0 - 1.3 10e9/L    Absolute Eosinophils 1.8 (H) 0.0 - 0.7 10e9/L    Absolute Basophils 0.2 0.0 - 0.2 10e9/L    Abs Immature Granulocytes 0.1 0 - 0.4 10e9/L    Absolute Nucleated RBC 0.6    Lactate for Sepsis Protocol     Status: None   Result Value Ref Range    Lactate for Sepsis Protocol 0.9 0.7 - 2.0 mmol/L   Erythrocyte sedimentation rate auto     Status: Abnormal   Result Value Ref Range    Sed Rate 26 (H) 0 - 20 mm/h   Reticulocyte count     Status: Abnormal   Result Value Ref Range    % Retic 27.9 (H) 0.5 - 2.0 %    Absolute Retic 680.5 (H) 25 - 95 10e9/L   HCG qualitative     Status: None   Result Value Ref Range    HCG Qualitative Serum Negative NEG^Negative   Lipase      Status: None   Result Value Ref Range    Lipase 145 73 - 393 U/L     Medications   morphine (PF) injection 2 mg (2 mg Intravenous Given 7/10/21 0604)   lactated ringers BOLUS 1,000 mL (0 mLs Intravenous Stopped 7/10/21 0722)         7:09 AM Declined UA.  Feeling better.      Assessments & Plan (with Medical Decision Making)   21 year old female with history of sickle cell disease to the emergency department with sickle cell pain crisis.  She has diffuse musculoskeletal pain especially in the back and neck today.  She is also had abdominal pain for the past week.  She was seen here yesterday and had labs that were essentially baseline.  Her right upper quadrant ultrasound did not reveal any acute pathology.  She is on Xarelto for history of pulmonary embolism.  She has mild tachycardia but otherwise normal vital signs.  She has generalized upper abdominal pain without any focal tenderness on exam.  Patient's labs are again baseline.  She continues to have a leukocytosis routinely but no fever or other signs or symptoms of infection.  Her hemoglobin is stable.  Due to her ongoing abdominal pain, I did discuss abdominal CT imaging which the patient would like to pursue.  That was performed reveals no acute intra-abdominal pathology.  She is feeling markedly better after IV fluids and 3 doses of IV morphine.  She denies any urinary tract infection symptoms and does not wish to wake to provide a urinalysis.  Patient discharged to home.  She should follow-up with her hematologist.  Return precautions provided.  I have reviewed the nursing notes. I have reviewed the findings, diagnosis, plan and need for follow up with the patient.    Discharge Medication List as of 7/10/2021  7:22 AM          Final diagnoses:   Sickle cell pain crisis (H)     Chart documentation was completed with Dragon voice-recognition software. Even though reviewed, this chart may still contain some grammatical, spelling, and word errors.     --  Francesco  Peter Narayan  [facility] EMERGENCY DEPARTMENT  7/10/2021     Francesco Green MD  07/10/21 4589

## 2021-07-10 NOTE — DISCHARGE INSTRUCTIONS
Drink plenty of fluids.  Take your medications as directed.    Follow-up with your hematologist next week.    Return to the emergency department if fever, vomiting, worsening symptoms, or other concerns.

## 2021-07-12 ENCOUNTER — APPOINTMENT (OUTPATIENT)
Dept: GENERAL RADIOLOGY | Facility: CLINIC | Age: 21
End: 2021-07-12
Attending: EMERGENCY MEDICINE
Payer: COMMERCIAL

## 2021-07-12 ENCOUNTER — HOSPITAL ENCOUNTER (EMERGENCY)
Facility: CLINIC | Age: 21
Discharge: LEFT BEFORE DISCHARGE | End: 2021-07-12
Attending: EMERGENCY MEDICINE
Payer: COMMERCIAL

## 2021-07-12 ENCOUNTER — PATIENT OUTREACH (OUTPATIENT)
Dept: ONCOLOGY | Facility: CLINIC | Age: 21
End: 2021-07-12

## 2021-07-12 ENCOUNTER — VIRTUAL VISIT (OUTPATIENT)
Dept: ONCOLOGY | Facility: CLINIC | Age: 21
End: 2021-07-12
Attending: PEDIATRICS
Payer: COMMERCIAL

## 2021-07-12 VITALS
BODY MASS INDEX: 29.02 KG/M2 | DIASTOLIC BLOOD PRESSURE: 83 MMHG | HEIGHT: 64 IN | WEIGHT: 170 LBS | SYSTOLIC BLOOD PRESSURE: 137 MMHG | RESPIRATION RATE: 20 BRPM | TEMPERATURE: 98.2 F | OXYGEN SATURATION: 96 % | HEART RATE: 124 BPM

## 2021-07-12 DIAGNOSIS — D57.00 SICKLE CELL PAIN CRISIS (H): Primary | ICD-10-CM

## 2021-07-12 DIAGNOSIS — R07.9 ACUTE CHEST PAIN: ICD-10-CM

## 2021-07-12 DIAGNOSIS — E83.111 HEMOCHROMATOSIS DUE TO REPEATED RED BLOOD CELL TRANSFUSIONS: ICD-10-CM

## 2021-07-12 DIAGNOSIS — D57.00 SICKLE CELL PAIN CRISIS (H): ICD-10-CM

## 2021-07-12 DIAGNOSIS — I26.94 MULTIPLE SUBSEGMENTAL PULMONARY EMBOLI WITHOUT ACUTE COR PULMONALE (H): ICD-10-CM

## 2021-07-12 LAB
ALBUMIN SERPL-MCNC: 4.1 G/DL (ref 3.4–5)
ALP SERPL-CCNC: 72 U/L (ref 40–150)
ALT SERPL W P-5'-P-CCNC: 68 U/L (ref 0–50)
ANION GAP SERPL CALCULATED.3IONS-SCNC: 8 MMOL/L (ref 3–14)
AST SERPL W P-5'-P-CCNC: 71 U/L (ref 0–45)
BASOPHILS # BLD MANUAL: 0 10E3/UL (ref 0–0.2)
BASOPHILS NFR BLD MANUAL: 0 %
BILIRUB SERPL-MCNC: 3.4 MG/DL (ref 0.2–1.3)
BUN SERPL-MCNC: 6 MG/DL (ref 7–30)
CALCIUM SERPL-MCNC: 9.1 MG/DL (ref 8.5–10.1)
CHLORIDE BLD-SCNC: 108 MMOL/L (ref 94–109)
CO2 SERPL-SCNC: 21 MMOL/L (ref 20–32)
CREAT SERPL-MCNC: 0.58 MG/DL (ref 0.52–1.04)
EOSINOPHIL # BLD MANUAL: 0 10E3/UL (ref 0–0.7)
EOSINOPHIL NFR BLD MANUAL: 0 %
ERYTHROCYTE [DISTWIDTH] IN BLOOD BY AUTOMATED COUNT: 23.7 % (ref 10–15)
GFR SERPL CREATININE-BSD FRML MDRD: >90 ML/MIN/1.73M2
GLUCOSE BLD-MCNC: 93 MG/DL (ref 70–99)
HCT VFR BLD AUTO: 23 % (ref 35–47)
HGB BLD-MCNC: 8.2 G/DL (ref 11.7–15.7)
LACTATE SERPL-SCNC: 1 MMOL/L (ref 0.7–2)
LYMPHOCYTES # BLD MANUAL: 7.8 10E3/UL (ref 0.8–5.3)
LYMPHOCYTES NFR BLD MANUAL: 66 %
MCH RBC QN AUTO: 32.2 PG (ref 26.5–33)
MCHC RBC AUTO-ENTMCNC: 35.7 G/DL (ref 31.5–36.5)
MCV RBC AUTO: 90 FL (ref 78–100)
MONOCYTES # BLD MANUAL: 0.1 10E3/UL (ref 0–1.3)
MONOCYTES NFR BLD MANUAL: 1 %
NEUTROPHILS # BLD MANUAL: 3.9 10E3/UL (ref 1.6–8.3)
NEUTROPHILS NFR BLD MANUAL: 33 %
PLAT MORPH BLD: ABNORMAL
PLATELET # BLD AUTO: 292 10E3/UL (ref 150–450)
POTASSIUM BLD-SCNC: 3.7 MMOL/L (ref 3.4–5.3)
PROCALCITONIN SERPL-MCNC: <0.05 NG/ML
PROT SERPL-MCNC: 8 G/DL (ref 6.8–8.8)
RBC # BLD AUTO: 2.55 10E6/UL (ref 3.8–5.2)
RBC MORPH BLD: ABNORMAL
RETICS # AUTO: 0.69 10E6/UL (ref 0.03–0.1)
RETICS/RBC NFR AUTO: 27.1 % (ref 0.5–2)
SODIUM SERPL-SCNC: 137 MMOL/L (ref 133–144)
WBC # BLD AUTO: 11.8 10E3/UL (ref 4–11)

## 2021-07-12 PROCEDURE — 250N000011 HC RX IP 250 OP 636: Performed by: EMERGENCY MEDICINE

## 2021-07-12 PROCEDURE — 96361 HYDRATE IV INFUSION ADD-ON: CPT | Performed by: EMERGENCY MEDICINE

## 2021-07-12 PROCEDURE — 99285 EMERGENCY DEPT VISIT HI MDM: CPT | Mod: 25 | Performed by: EMERGENCY MEDICINE

## 2021-07-12 PROCEDURE — 71046 X-RAY EXAM CHEST 2 VIEWS: CPT | Mod: 26 | Performed by: RADIOLOGY

## 2021-07-12 PROCEDURE — 96376 TX/PRO/DX INJ SAME DRUG ADON: CPT | Performed by: EMERGENCY MEDICINE

## 2021-07-12 PROCEDURE — 83605 ASSAY OF LACTIC ACID: CPT | Performed by: EMERGENCY MEDICINE

## 2021-07-12 PROCEDURE — 96374 THER/PROPH/DIAG INJ IV PUSH: CPT | Performed by: EMERGENCY MEDICINE

## 2021-07-12 PROCEDURE — 71046 X-RAY EXAM CHEST 2 VIEWS: CPT

## 2021-07-12 PROCEDURE — 250N000013 HC RX MED GY IP 250 OP 250 PS 637: Performed by: EMERGENCY MEDICINE

## 2021-07-12 PROCEDURE — 84145 PROCALCITONIN (PCT): CPT | Performed by: EMERGENCY MEDICINE

## 2021-07-12 PROCEDURE — 82040 ASSAY OF SERUM ALBUMIN: CPT | Performed by: EMERGENCY MEDICINE

## 2021-07-12 PROCEDURE — 99285 EMERGENCY DEPT VISIT HI MDM: CPT | Performed by: EMERGENCY MEDICINE

## 2021-07-12 PROCEDURE — 36592 COLLECT BLOOD FROM PICC: CPT | Performed by: EMERGENCY MEDICINE

## 2021-07-12 PROCEDURE — 85045 AUTOMATED RETICULOCYTE COUNT: CPT | Performed by: EMERGENCY MEDICINE

## 2021-07-12 PROCEDURE — 96375 TX/PRO/DX INJ NEW DRUG ADDON: CPT | Performed by: EMERGENCY MEDICINE

## 2021-07-12 PROCEDURE — 84484 ASSAY OF TROPONIN QUANT: CPT | Performed by: EMERGENCY MEDICINE

## 2021-07-12 PROCEDURE — 36415 COLL VENOUS BLD VENIPUNCTURE: CPT | Performed by: EMERGENCY MEDICINE

## 2021-07-12 PROCEDURE — 258N000003 HC RX IP 258 OP 636: Performed by: EMERGENCY MEDICINE

## 2021-07-12 PROCEDURE — 99214 OFFICE O/P EST MOD 30 MIN: CPT | Mod: GT | Performed by: PEDIATRICS

## 2021-07-12 PROCEDURE — 85027 COMPLETE CBC AUTOMATED: CPT | Performed by: EMERGENCY MEDICINE

## 2021-07-12 RX ORDER — DIPHENHYDRAMINE HCL 50 MG
50 CAPSULE ORAL ONCE
Status: COMPLETED | OUTPATIENT
Start: 2021-07-12 | End: 2021-07-12

## 2021-07-12 RX ORDER — IPRATROPIUM BROMIDE AND ALBUTEROL SULFATE 2.5; .5 MG/3ML; MG/3ML
SOLUTION RESPIRATORY (INHALATION)
Status: DISCONTINUED
Start: 2021-07-12 | End: 2021-07-12 | Stop reason: HOSPADM

## 2021-07-12 RX ORDER — MORPHINE SULFATE 2 MG/ML
2 INJECTION, SOLUTION INTRAMUSCULAR; INTRAVENOUS ONCE
Status: DISCONTINUED | OUTPATIENT
Start: 2021-07-12 | End: 2021-07-12 | Stop reason: HOSPADM

## 2021-07-12 RX ORDER — ALBUTEROL SULFATE 90 UG/1
2 AEROSOL, METERED RESPIRATORY (INHALATION) ONCE
Status: DISCONTINUED | OUTPATIENT
Start: 2021-07-12 | End: 2021-07-12 | Stop reason: HOSPADM

## 2021-07-12 RX ORDER — ONDANSETRON 2 MG/ML
8 INJECTION INTRAMUSCULAR; INTRAVENOUS ONCE
Status: COMPLETED | OUTPATIENT
Start: 2021-07-12 | End: 2021-07-12

## 2021-07-12 RX ORDER — MORPHINE SULFATE 2 MG/ML
2 INJECTION, SOLUTION INTRAMUSCULAR; INTRAVENOUS
Status: COMPLETED | OUTPATIENT
Start: 2021-07-12 | End: 2021-07-12

## 2021-07-12 RX ORDER — SODIUM CHLORIDE, SODIUM LACTATE, POTASSIUM CHLORIDE, CALCIUM CHLORIDE 600; 310; 30; 20 MG/100ML; MG/100ML; MG/100ML; MG/100ML
1000 INJECTION, SOLUTION INTRAVENOUS CONTINUOUS
Status: DISCONTINUED | OUTPATIENT
Start: 2021-07-12 | End: 2021-07-12 | Stop reason: HOSPADM

## 2021-07-12 RX ADMIN — DIPHENHYDRAMINE HYDROCHLORIDE 50 MG: 50 CAPSULE ORAL at 03:44

## 2021-07-12 RX ADMIN — MORPHINE SULFATE 2 MG: 2 INJECTION, SOLUTION INTRAMUSCULAR; INTRAVENOUS at 05:57

## 2021-07-12 RX ADMIN — MORPHINE SULFATE 2 MG: 2 INJECTION, SOLUTION INTRAMUSCULAR; INTRAVENOUS at 04:54

## 2021-07-12 RX ADMIN — SODIUM CHLORIDE, POTASSIUM CHLORIDE, SODIUM LACTATE AND CALCIUM CHLORIDE 1000 ML: 600; 310; 30; 20 INJECTION, SOLUTION INTRAVENOUS at 03:44

## 2021-07-12 RX ADMIN — MORPHINE SULFATE 2 MG: 2 INJECTION, SOLUTION INTRAMUSCULAR; INTRAVENOUS at 03:43

## 2021-07-12 RX ADMIN — ONDANSETRON 8 MG: 2 INJECTION INTRAMUSCULAR; INTRAVENOUS at 03:44

## 2021-07-12 ASSESSMENT — MIFFLIN-ST. JEOR: SCORE: 1516.11

## 2021-07-12 NOTE — LETTER
7/12/2021         RE: Jennifer Cervantes  [address]  [facility] [address]        Dear Colleague,    Thank you for referring your patient, Jennifer Cervantes, to the [facility]. Please see a copy of my visit note below.    Sickle Cell Outpatient Follow Up Visit      Date of encounter: Jul 12, 2021    Jennifer Cervantes is a 21 year old female who is being seen virtually for hospital follow up and health maintenance related to SCD      Interval History: Jennifer still has frequent pain and is being seen in the ED every couple days (13 times since 6/18), though it is often clustered to 2-3 times over as many days with a few days in between. She has not been admitted however. She was in this AM and had some chest pain but refused the IV for her VQ scan, getting angry and eventually leaving without completing the test. She said she has had a series of ED visits where she is disrespected and feels like she is ignored, i.e. going 3-4 hours between infusions per her report (unconfirmed). She continues on crizanlizumab, having received her second dose on 7/2 and is now on a monthly schedule. Pain is all in her typical locations and the chest pain is not new. She said her chest discomfort is still present today but she was going to try some ibuprofen--she prefers not to add even more new medications. No new changes at home--she is still living with her family and she feels it's dysfunctional. No issues with the Xarelto (on it through 7/29). She has not missed any doses. 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 She also has significant anxiety and depression with a strong anxiety about transferring to the adult clinic. She usually has pain crises secondary to the anxiety.      --------------------------------  Plan last reviewed with patient: 7/12/2021    Patient background: 22yo F, enjoys movies and kids though there are times where she does not really want to talk to people. Does not have a lot of social support at home.     Sickle Cell Disease History  Primary Hematologist: Perla  PCP: Raul  Genotype: SS  Acute Pain Crisis Treatment:    ER/Acute Care/Infusion Clinic:   o Morphine 2 mg IVP/SC Q1H X 3 doses  o Toradol x1 (avoid through 7/27/21 due to being on anticoagulation)  o Maintenance IV fluids with LR  o Other: Benadryl PO and Zofran 8 mg IV PRN itching or nausea    Inpatient:  o Opioid: Morphine 2 mg IV Q1H PRN until PCA starts  o PCA plan:   - PCA button dose: Morphine 1 mg  - Lockout: 20 minutes  - Continuous Infusion: consider 1 mg/hr  - One hr max: 4 mg  o Other Medications: Benadryl, Zofran  o If PCA not working, she Has had success with ketamine starting at 4mg/h and advancing only to 6mg/h, as 8mg/h made her feel quite poorly.  o ASA on hold  o Supportive Care: Docusate, Senna  Chronic Pain Medications:    Home regimen Oxycontin 10 mg q12h, oxycodone 15 mg p.o. q.4-6 hours p.r.n. breakthrough pain.  She also continues on Voltaren gel, and Zoloft among other medications.    -Also benefits from mental health visits, acupuncture  Baseline Hemoglobin: 7 g/dl without chronic transfusions  Hydroxyurea use: Yes  H/O blood transfusions: Yes, several (iron overload) Most recent 8/21/20    H/O Transfusion Reactions: no    Antibodies:none  H/O Acute Chest Syndrome: Yes    Last episode: 10/2019     ICU/intubation: unsure  H/O Stroke: Yes (managed with chronic transfusions in the past, stopped late Spring 2020)  H/O VTE: Yes (2/2021)  H/O Cholecystectomy or Splenectomy:  no  H/O Asthma, Pulm HTN, AVN, Leg Ulcers, Nephropathy, Retinopathy, etc: Iron overload, asthma, chronic lung disease, mild developmental delay from early stroke    -------------------------------------------    Sickle Cell Disease Comprehensive Checklist    Bone Health/Avascular Necrosis Screening/Symptoms (each visit): no new concerns today    Leg Ulcer evaluation (every visit): none    Hypertension (every visit): borderline hypertensive recently but improved later in evening and previous day on most of ED visits    Last pulmonary evaluation (asthma, AMAN, pulm HTN): within last year( due again)    Stroke/silent cerebral infarct Hx (Y/N): Yes TIA ~2014, first event ~age 1 with full stroke and R sided weakness    Last PCP Visit: 8/2020    Vaccines:  o PCV13: 5/13/19  o Pneumovax (PPSV23): 3/04, 10/09, 7/12/19 (next due 7/2024)  o Menactra: 4/2010, 9/2015 (MCV overdue Sept 2020)  o Influenza: got 20-21 vaccine per self report    Audiology (chelation): done 6/2020, normal (due again)    Past Medical History:  Past Medical History:   Diagnosis Date     Anxiety      Bleeding disorder (H)      Blood clotting disorder (H)      Cerebral infarction (H) 2015     Cognitive developmental delay     low IQ. 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 (H)     right hand contractures     Iron overload due to repeated red blood cell transfusions      Migraines      Multiple subsegmental pulmonary emboli without acute cor pulmonale (H) 02/01/2021     Oppositional defiant behavior      Superficial venous thrombosis of arm, right 03/25/2021     Uncomplicated asthma        Past Surgical History:  Past Surgical History:   Procedure Laterality Date     AS INSERT TUNNELED CV 2 CATH W/O PORT/PUMP       C BREAST REDUCTION (INCLUDES LIPO) TIER 3 Bilateral 04/23/2019     CHOLECYSTECTOMY       INSERT PORT VASCULAR  "ACCESS Left 4/21/2021    Procedure: INSERTION, VASCULAR ACCESS PORT (NOT SURE ON SIDE UNTIL REMOVAL);  Surgeon: Rajan More MD;  Location: [facility] OR     IR CHEST PORT PLACEMENT > 5 YRS OF AGE  4/21/2021     IR CVC NON TUNNEL LINE REMOVAL  5/6/2021     IR CVC NON TUNNEL PLACEMENT  04/07/2020     IR CVC NON TUNNEL PLACEMENT  4/30/2021     IR PORT REMOVAL LEFT  4/21/2021     REMOVE PORT VASCULAR ACCESS Left 4/21/2021    Procedure: REMOVAL, VASCULAR ACCESS PORT LEFT;  Surgeon: Rajan More MD;  Location: [facility] OR     REPAIR TENDON ELBOW Right 10/02/2019    Procedure: Right Elbow Flexor Lengthening, Flexor Pronator Slide Of Wrist and Finger, Thumb Adductor Lengthening;  Surgeon: Anai Franco MD;  Location: [facility] OR     TONSILLECTOMY Bilateral 10/02/2019    Procedure: Bilateral Tonsillectomy;  Surgeon: Farhana Guy MD;  Location: [facility] OR       Family History:   Family History   Problem Relation Age of Onset     Sickle Cell Trait Mother      Hypertension Mother      Asthma Mother      Sickle Cell Trait Father        Social History:  Social History     Socioeconomic History     Marital status: Single     Spouse name: Not on file     Number of children: Not on file     Years of education: Not on file     Highest education level: Not on file   Occupational History     Not on file   Tobacco Use     Smoking status: Never Smoker     Smokeless tobacco: Never Used   Substance and Sexual Activity     Alcohol use: Not Currently     Alcohol/week: 0.0 standard drinks     Drug use: Never     Sexual activity: Not Currently     Partners: Male     Birth control/protection: Other   Other Topics Concern     Parent/sibling w/ CABG, MI or angioplasty before 65F 55M? Not Asked   Social History Narrative    Lives with mom and extended family but \"toxic environment\" per her report. She would like to move out but cannot financially do so. She has minimal support at home despite her significant SCD comorbidities and cognitive " delay from stroke.     Social Determinants of Health     Financial Resource Strain:      Difficulty of Paying Living Expenses:    Food Insecurity:      Worried About Running Out of Food in the Last Year:      Ran Out of Food in the Last Year:    Transportation Needs:      Lack of Transportation (Medical):      Lack of Transportation (Non-Medical):    Physical Activity:      Days of Exercise per Week:      Minutes of Exercise per Session:    Stress:      Feeling of Stress :    Social Connections:      Frequency of Communication with Friends and Family:      Frequency of Social Gatherings with Friends and Family:      Attends Yazidism Services:      Active Member of Clubs or Organizations:      Attends Club or Organization Meetings:      Marital Status:    Intimate Partner Violence:      Fear of Current or Ex-Partner:      Emotionally Abused:      Physically Abused:      Sexually Abused:        Medications:  Current Outpatient Medications   Medication     acetaminophen (TYLENOL) 325 MG tablet     albuterol (PROAIR HFA/PROVENTIL HFA/VENTOLIN HFA) 108 (90 Base) MCG/ACT inhaler     albuterol (PROVENTIL) (2.5 MG/3ML) 0.083% neb solution     ARIPiprazole (ABILIFY) 2 MG tablet     budesonide-formoterol (SYMBICORT) 160-4.5 MCG/ACT Inhaler     diclofenac (VOLTAREN) 1 % topical gel     diphenhydrAMINE (BENADRYL) 25 MG capsule     EPINEPHrine (ANY BX GENERIC EQUIV) 0.3 MG/0.3ML injection 2-pack     Hydroxyurea 1000 MG TABS     hydrOXYzine (ATARAX) 25 MG tablet     JADENU 360 MG tablet     lidocaine-prilocaine (EMLA) 2.5-2.5 % external cream     medroxyPROGESTERone (DEPO-PROVERA) 150 MG/ML IM injection     naloxone (NARCAN) 4 MG/0.1ML nasal spray     ondansetron (ZOFRAN) 8 MG tablet     oxyCODONE (OXYCONTIN) 10 MG 12 hr tablet     oxyCODONE IR (ROXICODONE) 15 MG tablet     rivaroxaban ANTICOAGULANT (XARELTO ANTICOAGULANT) 20 MG TABS tablet     sertraline (ZOLOFT) 100 MG tablet     Current Facility-Administered Medications    Medication     medroxyPROGESTERone (DEPO-PROVERA) syringe 150 mg         Physical Exam:   There were no vitals taken for this visit. (virtual)    GEN: young  female, appears tired and was crying and emotional most of the visit  HEENT: slight icterus, MMM  RESP: speaking in full sentences through tears, no increased work of breathing, clear to auscultation  SKIN: no bruising noted  NEURO: alert and oriented      Labs:   Results for HIMANSHU AL (MRN [id number]) as of 7/12/2021 12:25   Ref. Range 7/12/2021 03:38   WBC Latest Ref Range: 4.0 - 11.0 10e3/uL 11.8 (H)   Hemoglobin Latest Ref Range: 11.7 - 15.7 g/dL 8.2 (L)   Hematocrit Latest Ref Range: 35.0 - 47.0 % 23.0 (L)   Platelet Count Latest Ref Range: 150 - 450 10e3/uL 292   RBC Count Latest Ref Range: 3.80 - 5.20 10e6/uL 2.55 (L)   MCV Latest Ref Range: 78 - 100 fL 90   MCH Latest Ref Range: 26.5 - 33.0 pg 32.2   MCHC Latest Ref Range: 31.5 - 36.5 g/dL 35.7   RDW Latest Ref Range: 10.0 - 15.0 % 23.7 (H)   % Neutrophils Latest Units: % 33   % Lymphocytes Latest Units: % 66   % Monocytes Latest Units: % 1   % Eosinophils Latest Units: % 0   % Basophils Latest Units: % 0   Absolute Neutrophil Latest Ref Range: 1.6 - 8.3 10e3/uL 3.9   Absolute Lymphocytes Latest Ref Range: 0.8 - 5.3 10e3/uL 7.8 (H)   Absolute Monocytes Latest Ref Range: 0.0 - 1.3 10e3/uL 0.1   Absolute Eosinophils Latest Ref Range: 0.0 - 0.7 10e3/uL 0.0   RBC Morphology Unknown Confirmed RBC Indices   % Retic Latest Ref Range: 0.5 - 2.0 % 27.1 (H)   Absolute Retic Latest Ref Range: 0.025 - 0.095 10e6/uL 0.691 (H)   Platelet Morphology Latest Ref Range: Automated Count Confirmed. Platelet morphology is normal.  Automated Count Confirmed. Platelet morphology is normal.   Glucose Latest Ref Range: 70 - 99 mg/dL 93   Sodium Latest Ref Range: 133 - 144 mmol/L 137   Potassium Latest Ref Range: 3.4 - 5.3 mmol/L 3.7   Chloride Latest Ref Range: 94 - 109 mmol/L 108   Carbon Dioxide Latest  Ref Range: 20 - 32 mmol/L 21   Urea Nitrogen Latest Ref Range: 7 - 30 mg/dL 6 (L)   Creatinine Latest Ref Range: 0.52 - 1.04 mg/dL 0.58   GFR Estimate Latest Ref Range: >60 mL/min/1.73m2 >90   Calcium Latest Ref Range: 8.5 - 10.1 mg/dL 9.1   Anion Gap Latest Ref Range: 3 - 14 mmol/L 8   Albumin Latest Ref Range: 3.4 - 5.0 g/dL 4.1   Protein Total Latest Ref Range: 6.8 - 8.8 g/dL 8.0   Bilirubin Total Latest Ref Range: 0.2 - 1.3 mg/dL 3.4 (H)   ALT Latest Ref Range: 0 - 50 U/L 68 (H)   AST Latest Ref Range: 0 - 45 U/L 71 (H)   Lactic Acid Latest Ref Range: 0.7 - 2.0 mmol/L 1.0   Procalcitonin Latest Ref Range: <5.00 ng/mL <0.05   Alkaline Phosphatase Latest Ref Range: 40 - 150 U/L 72   Absolute Basophils Latest Ref Range: 0.0 - 0.2 10e3/uL 0.0       Imaging:   EXAM: XR CHEST 2 VW  LOCATION: [facility]  DATE/TIME: 7/12/2021 4:12 AM     INDICATION: Chest pain, SCC.  COMPARISON: 6/26/2021.                                                                      IMPRESSION: Normal heart size and pulmonary vascularity. Lungs are clear. Left-sided Port-A-Cath tip over the SVC. No significant bony abnormalities. Otherwise unremarkable. No acute findings.        Ferriscan 3/25/21  Average liver iron concentration is 43 mg/g dry tissue (normal = 0.17 - 1.8)  Average liver iron concentration is 770 mmol/kg dry tissue (normal = 3 - 33)     Other findings: Diffuse hypodensity of  liver and spleen, consistent with secondary hemochromatosis.     ----------    Assessment:  Jennifer Cervantes is a 21 year old female with HbSS. Her disease has been complicated by stroke leading to significant cognitive delay and right sided hemiparesis, high anxiety leading to frequent pain crises on top of chronic pain syndrome, poor social structure at home with no real support, and iron overload secondary to repeated transfusions. She has had significant stressors at home and her 2021 has been marked by a long admission and separately, PE and  SVT + DVT in RUE of unclear chronicity. In recent weeks she has continued to have ED visits nearly every other day on average and feels distraught by her experiences to the point she wants to give up on getting care here. She also has clear evidence of massive iron overload, which was suspected but is requiring urgent intervention.    Major Topics of the Visit:  1. Pain Management: Updated pain plan No change to overall strategy and no refills needed today. We did not discuss an Oxycontin wean as we worked to discuss her current mental distress with care at [facility]. We will work to try to schedule her in infusion daily for 2 weeks, then up to 3 days a week, in order to keep her out of the hospital. Today was the most distraught that I recall.    -Crizanlizumab monthly (started June 2021)  2. Iron overload/Pharmacy Issues: She had Jadenu ordered a few months ago but there have been difficulties getting it at refill time. She is now on Desferal as well. LIC was 43 (3/2021) so we need to act urgently. Desferal HD over 48 hours is going well. Synthetic function for the liver seems to be intact  Repeat Ferriscan to be done 7/19. Needs audiology follow up as well, last done June 2020.  3. High RBC turnover: Jennifer really has a high degree of RBC turnover, more than most patients I have seen. Oxybryta led to more frequent pain episodes (chest pain, which was new) and did not change the RBC turnover so it was stopped in December. No change currently  4. Pulmonary Emboli + new RUE DVT (innominate vein) of unclear chronicity + new symptomatic R cephalic SVT this week: On Rivaroxaban. Treating through 7/27. Holding aspirin while on anticoagulation. She may have some pulmonary HTN with her recurrent chest discomfort and known PEs. Will work to get her back in to see pulmonology since she missed a visit last month  5. Stroke sequelae on right side: We will work to reschedule PM&R to see if they may be able to better assist with her  weakness. I do not think a surgical approach is warranted at this point. This visit has not yet occurred  6. Mental Health: Refilled arapiprazole and sertraline. Will try to get her re-initiated with Dr. Carter. Some of it is related to treatment in ED. I have now added documentation specifically mentioning the incontinence into the Care Coordination note.  Follow up: 7/19 with Andrei Machado.      Review of external notes as documented above   Review of the result(s) of each unique test - labs as above  Diagnosis or treatment significantly limited by social determinants of health - sickle cell disease, racial inequity, difficult social situation at home  Ordering of specific tests    Virtual visit start: 1:18 pm  Virtual visit end: 1:37 pm  Duration of call: 19 minutes  35 min spent on the date of the encounter in chart review, patient visit, review of tests, documentation and/or discussion with other providers about the issues documented above.       Eric Duncan MD  Hematologist  Division of Hematology, Oncology, and Transplantation  [facility]  [facility] [place]  Pager: [phone]            Jennifer is a 21 year old who is being evaluated via a billable video visit.      How would you like to obtain your AVS? MyChart     If the video visit is dropped, the invitation should be resent by: Text to cell phone: [phone]     Will anyone else be joining your video visit? No          Vitals - Patient Reported  Pain Score: Severe Pain (6)  Pain Loc: Other - see comment (ALL OVER)    Patient was at ER today. Is at home now. Pain level 6/10 all over.    Shahriar Sheldon LPN      Video-Visit Details    Type of service:  Video Visit  Video Start Time: 1:18 pm  Video End Time:1:37 pm    Originating Location (pt. Location): Home    Distant Location (provider location):  [facility]     Platform used for Video Visit: AmWell      Again, thank you for  allowing me to participate in the care of your patient.        Sincerely,        Eric Duncan MD

## 2021-07-12 NOTE — ED TRIAGE NOTES
Pt presenting to ER from home with c/o Sickle Cell Pain. Pt states that she is having chesty pain. Pt reports that its been gojng on for 2 days. Pt came in tonight because she couldn't handle the pan at home.

## 2021-07-12 NOTE — PROGRESS NOTES
Sickle Cell Outpatient Follow Up Visit      Date of encounter: Jul 12, 2021    Jennifer Cervantes is a 21 year old female who is being seen virtually for hospital follow up and health maintenance related to SCD      Interval History: Jennifer still has frequent pain and is being seen in the ED every couple days (13 times since 6/18), though it is often clustered to 2-3 times over as many days with a few days in between. She has not been admitted however. She was in this AM and had some chest pain but refused the IV for her VQ scan, getting angry and eventually leaving without completing the test. She said she has had a series of ED visits where she is disrespected and feels like she is ignored, i.e. going 3-4 hours between infusions per her report (unconfirmed). She continues on crizanlizumab, having received her second dose on 7/2 and is now on a monthly schedule. Pain is all in her typical locations and the chest pain is not new. She said her chest discomfort is still present today but she was going to try some ibuprofen--she prefers not to add even more new medications. No new changes at home--she is still living with her family and she feels it's dysfunctional. No issues with the Xarelto (on it through 7/29). She has not missed any doses. 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 She also has significant anxiety and depression with a strong anxiety about transferring to the adult clinic. She usually has pain crises secondary to the anxiety.      --------------------------------  Plan last  reviewed with patient: 7/12/2021    Patient background: 20yo F, enjoys movies and kids though there are times where she does not really want to talk to people. Does not have a lot of social support at home.     Sickle Cell Disease History  Primary Hematologist: Perla  PCP: Raul  Genotype: SS  Acute Pain Crisis Treatment:    ER/Acute Care/Infusion Clinic:   o Morphine 2 mg IVP/SC Q1H X 3 doses  o Toradol x1 (avoid through 7/27/21 due to being on anticoagulation)  o Maintenance IV fluids with LR  o Other: Benadryl PO and Zofran 8 mg IV PRN itching or nausea    Inpatient:  o Opioid: Morphine 2 mg IV Q1H PRN until PCA starts  o PCA plan:   - PCA button dose: Morphine 1 mg  - Lockout: 20 minutes  - Continuous Infusion: consider 1 mg/hr  - One hr max: 4 mg  o Other Medications: Benadryl, Zofran  o If PCA not working, she Has had success with ketamine starting at 4mg/h and advancing only to 6mg/h, as 8mg/h made her feel quite poorly.  o ASA on hold  o Supportive Care: Docusate, Senna  Chronic Pain Medications:    Home regimen Oxycontin 10 mg q12h, oxycodone 15 mg p.o. q.4-6 hours p.r.n. breakthrough pain.  She also continues on Voltaren gel, and Zoloft among other medications.    -Also benefits from mental health visits, acupuncture  Baseline Hemoglobin: 7 g/dl without chronic transfusions  Hydroxyurea use: Yes  H/O blood transfusions: Yes, several (iron overload) Most recent 8/21/20    H/O Transfusion Reactions: no    Antibodies:none  H/O Acute Chest Syndrome: Yes    Last episode: 10/2019     ICU/intubation: unsure  H/O Stroke: Yes (managed with chronic transfusions in the past, stopped late Spring 2020)  H/O VTE: Yes (2/2021)  H/O Cholecystectomy or Splenectomy: no  H/O Asthma, Pulm HTN, AVN, Leg Ulcers, Nephropathy, Retinopathy, etc: Iron overload, asthma, chronic lung disease, mild developmental delay from early stroke    -------------------------------------------    Sickle Cell Disease Comprehensive  Checklist    Bone Health/Avascular Necrosis Screening/Symptoms (each visit): no new concerns today    Leg Ulcer evaluation (every visit): none    Hypertension (every visit): borderline hypertensive recently but improved later in evening and previous day on most of ED visits    Last pulmonary evaluation (asthma, AMAN, pulm HTN): within last year( due again)    Stroke/silent cerebral infarct Hx (Y/N): Yes TIA ~2014, first event ~age 1 with full stroke and R sided weakness    Last PCP Visit: 8/2020    Vaccines:  o PCV13: 5/13/19  o Pneumovax (PPSV23): 3/04, 10/09, 7/12/19 (next due 7/2024)  o Menactra: 4/2010, 9/2015 (MCV overdue Sept 2020)  o Influenza: got 20-21 vaccine per self report    Audiology (chelation): done 6/2020, normal (due again)    Past Medical History:  Past Medical History:   Diagnosis Date     Anxiety      Bleeding disorder (H)      Blood clotting disorder (H)      Cerebral infarction (H) 2015     Cognitive developmental delay     low IQ. 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 (H)     right hand contractures     Iron overload due to repeated red blood cell transfusions      Migraines      Multiple subsegmental pulmonary emboli without acute cor pulmonale (H) 02/01/2021     Oppositional defiant behavior      Superficial venous thrombosis of arm, right 03/25/2021     Uncomplicated asthma        Past Surgical History:  Past Surgical History:   Procedure Laterality Date     AS INSERT TUNNELED CV 2 CATH W/O PORT/PUMP       C BREAST REDUCTION (INCLUDES LIPO) TIER 3 Bilateral 04/23/2019     CHOLECYSTECTOMY       INSERT PORT VASCULAR ACCESS Left 4/21/2021    Procedure: INSERTION, VASCULAR ACCESS PORT (NOT SURE ON SIDE UNTIL REMOVAL);  Surgeon: Rajan More MD;  Location: [facility] OR     IR CHEST PORT PLACEMENT > 5 YRS OF AGE  4/21/2021     IR CVC NON TUNNEL LINE REMOVAL  5/6/2021  "    IR CVC NON TUNNEL PLACEMENT  04/07/2020     IR CVC NON TUNNEL PLACEMENT  4/30/2021     IR PORT REMOVAL LEFT  4/21/2021     REMOVE PORT VASCULAR ACCESS Left 4/21/2021    Procedure: REMOVAL, VASCULAR ACCESS PORT LEFT;  Surgeon: Rajan More MD;  Location: [facility] OR     REPAIR TENDON ELBOW Right 10/02/2019    Procedure: Right Elbow Flexor Lengthening, Flexor Pronator Slide Of Wrist and Finger, Thumb Adductor Lengthening;  Surgeon: Anai Franco MD;  Location: [facility] OR     TONSILLECTOMY Bilateral 10/02/2019    Procedure: Bilateral Tonsillectomy;  Surgeon: Farhana Guy MD;  Location: [facility] OR       Family History:   Family History   Problem Relation Age of Onset     Sickle Cell Trait Mother      Hypertension Mother      Asthma Mother      Sickle Cell Trait Father        Social History:  Social History     Socioeconomic History     Marital status: Single     Spouse name: Not on file     Number of children: Not on file     Years of education: Not on file     Highest education level: Not on file   Occupational History     Not on file   Tobacco Use     Smoking status: Never Smoker     Smokeless tobacco: Never Used   Substance and Sexual Activity     Alcohol use: Not Currently     Alcohol/week: 0.0 standard drinks     Drug use: Never     Sexual activity: Not Currently     Partners: Male     Birth control/protection: Other   Other Topics Concern     Parent/sibling w/ CABG, MI or angioplasty before 65F 55M? Not Asked   Social History Narrative    Lives with mom and extended family but \"toxic environment\" per her report. She would like to move out but cannot financially do so. She has minimal support at home despite her significant SCD comorbidities and cognitive delay from stroke.     Social Determinants of Health     Financial Resource Strain:      Difficulty of Paying Living Expenses:    Food Insecurity:      Worried About Running Out of Food in the Last Year:      Ran Out of Food in the Last Year:  "   Transportation Needs:      Lack of Transportation (Medical):      Lack of Transportation (Non-Medical):    Physical Activity:      Days of Exercise per Week:      Minutes of Exercise per Session:    Stress:      Feeling of Stress :    Social Connections:      Frequency of Communication with Friends and Family:      Frequency of Social Gatherings with Friends and Family:      Attends Worship Services:      Active Member of Clubs or Organizations:      Attends Club or Organization Meetings:      Marital Status:    Intimate Partner Violence:      Fear of Current or Ex-Partner:      Emotionally Abused:      Physically Abused:      Sexually Abused:        Medications:  Current Outpatient Medications   Medication     acetaminophen (TYLENOL) 325 MG tablet     albuterol (PROAIR HFA/PROVENTIL HFA/VENTOLIN HFA) 108 (90 Base) MCG/ACT inhaler     albuterol (PROVENTIL) (2.5 MG/3ML) 0.083% neb solution     ARIPiprazole (ABILIFY) 2 MG tablet     budesonide-formoterol (SYMBICORT) 160-4.5 MCG/ACT Inhaler     diclofenac (VOLTAREN) 1 % topical gel     diphenhydrAMINE (BENADRYL) 25 MG capsule     EPINEPHrine (ANY BX GENERIC EQUIV) 0.3 MG/0.3ML injection 2-pack     Hydroxyurea 1000 MG TABS     hydrOXYzine (ATARAX) 25 MG tablet     JADENU 360 MG tablet     lidocaine-prilocaine (EMLA) 2.5-2.5 % external cream     medroxyPROGESTERone (DEPO-PROVERA) 150 MG/ML IM injection     naloxone (NARCAN) 4 MG/0.1ML nasal spray     ondansetron (ZOFRAN) 8 MG tablet     oxyCODONE (OXYCONTIN) 10 MG 12 hr tablet     oxyCODONE IR (ROXICODONE) 15 MG tablet     rivaroxaban ANTICOAGULANT (XARELTO ANTICOAGULANT) 20 MG TABS tablet     sertraline (ZOLOFT) 100 MG tablet     Current Facility-Administered Medications   Medication     medroxyPROGESTERone (DEPO-PROVERA) syringe 150 mg         Physical Exam:   There were no vitals taken for this visit. (virtual)    GEN: young  female, appears tired and was crying and emotional most of the  visit  HEENT: slight icterus, MMM  RESP: speaking in full sentences through tears, no increased work of breathing, clear to auscultation  SKIN: no bruising noted  NEURO: alert and oriented      Labs:   Results for HIMANSHU AL (MRN [id number]) as of 7/12/2021 12:25   Ref. Range 7/12/2021 03:38   WBC Latest Ref Range: 4.0 - 11.0 10e3/uL 11.8 (H)   Hemoglobin Latest Ref Range: 11.7 - 15.7 g/dL 8.2 (L)   Hematocrit Latest Ref Range: 35.0 - 47.0 % 23.0 (L)   Platelet Count Latest Ref Range: 150 - 450 10e3/uL 292   RBC Count Latest Ref Range: 3.80 - 5.20 10e6/uL 2.55 (L)   MCV Latest Ref Range: 78 - 100 fL 90   MCH Latest Ref Range: 26.5 - 33.0 pg 32.2   MCHC Latest Ref Range: 31.5 - 36.5 g/dL 35.7   RDW Latest Ref Range: 10.0 - 15.0 % 23.7 (H)   % Neutrophils Latest Units: % 33   % Lymphocytes Latest Units: % 66   % Monocytes Latest Units: % 1   % Eosinophils Latest Units: % 0   % Basophils Latest Units: % 0   Absolute Neutrophil Latest Ref Range: 1.6 - 8.3 10e3/uL 3.9   Absolute Lymphocytes Latest Ref Range: 0.8 - 5.3 10e3/uL 7.8 (H)   Absolute Monocytes Latest Ref Range: 0.0 - 1.3 10e3/uL 0.1   Absolute Eosinophils Latest Ref Range: 0.0 - 0.7 10e3/uL 0.0   RBC Morphology Unknown Confirmed RBC Indices   % Retic Latest Ref Range: 0.5 - 2.0 % 27.1 (H)   Absolute Retic Latest Ref Range: 0.025 - 0.095 10e6/uL 0.691 (H)   Platelet Morphology Latest Ref Range: Automated Count Confirmed. Platelet morphology is normal.  Automated Count Confirmed. Platelet morphology is normal.   Glucose Latest Ref Range: 70 - 99 mg/dL 93   Sodium Latest Ref Range: 133 - 144 mmol/L 137   Potassium Latest Ref Range: 3.4 - 5.3 mmol/L 3.7   Chloride Latest Ref Range: 94 - 109 mmol/L 108   Carbon Dioxide Latest Ref Range: 20 - 32 mmol/L 21   Urea Nitrogen Latest Ref Range: 7 - 30 mg/dL 6 (L)   Creatinine Latest Ref Range: 0.52 - 1.04 mg/dL 0.58   GFR Estimate Latest Ref Range: >60 mL/min/1.73m2 >90   Calcium Latest Ref Range: 8.5 - 10.1 mg/dL 9.1    Anion Gap Latest Ref Range: 3 - 14 mmol/L 8   Albumin Latest Ref Range: 3.4 - 5.0 g/dL 4.1   Protein Total Latest Ref Range: 6.8 - 8.8 g/dL 8.0   Bilirubin Total Latest Ref Range: 0.2 - 1.3 mg/dL 3.4 (H)   ALT Latest Ref Range: 0 - 50 U/L 68 (H)   AST Latest Ref Range: 0 - 45 U/L 71 (H)   Lactic Acid Latest Ref Range: 0.7 - 2.0 mmol/L 1.0   Procalcitonin Latest Ref Range: <5.00 ng/mL <0.05   Alkaline Phosphatase Latest Ref Range: 40 - 150 U/L 72   Absolute Basophils Latest Ref Range: 0.0 - 0.2 10e3/uL 0.0       Imaging:   EXAM: XR CHEST 2 VW  LOCATION: [facility]  DATE/TIME: 7/12/2021 4:12 AM     INDICATION: Chest pain, SCC.  COMPARISON: 6/26/2021.                                                                      IMPRESSION: Normal heart size and pulmonary vascularity. Lungs are clear. Left-sided Port-A-Cath tip over the SVC. No significant bony abnormalities. Otherwise unremarkable. No acute findings.        Ferriscan 3/25/21  Average liver iron concentration is 43 mg/g dry tissue (normal = 0.17 - 1.8)  Average liver iron concentration is 770 mmol/kg dry tissue (normal = 3 - 33)     Other findings: Diffuse hypodensity of  liver and spleen, consistent with secondary hemochromatosis.     ----------    Assessment:  Jennifer Cervantes is a 21 year old female with HbSS. Her disease has been complicated by stroke leading to significant cognitive delay and right sided hemiparesis, high anxiety leading to frequent pain crises on top of chronic pain syndrome, poor social structure at home with no real support, and iron overload secondary to repeated transfusions. She has had significant stressors at home and her 2021 has been marked by a long admission and separately, PE and SVT + DVT in RUE of unclear chronicity. In recent weeks she has continued to have ED visits nearly every other day on average and feels distraught by her experiences to the point she wants to give up on getting care here. She also has  clear evidence of massive iron overload, which was suspected but is requiring urgent intervention.    Major Topics of the Visit:  1. Pain Management: Updated pain plan No change to overall strategy and no refills needed today. We did not discuss an Oxycontin wean as we worked to discuss her current mental distress with care at [facility]. We will work to try to schedule her in infusion daily for 2 weeks, then up to 3 days a week, in order to keep her out of the hospital. Today was the most distraught that I recall.    -Crizanlizumab monthly (started June 2021)  2. Iron overload/Pharmacy Issues: She had Jadenu ordered a few months ago but there have been difficulties getting it at refill time. She is now on Desferal as well. LIC was 43 (3/2021) so we need to act urgently. Desferal HD over 48 hours is going well. Synthetic function for the liver seems to be intact  Repeat Ferriscan to be done 7/19. Needs audiology follow up as well, last done June 2020.  3. High RBC turnover: Jennifer really has a high degree of RBC turnover, more than most patients I have seen. Oxybryta led to more frequent pain episodes (chest pain, which was new) and did not change the RBC turnover so it was stopped in December. No change currently  4. Pulmonary Emboli + new RUE DVT (innominate vein) of unclear chronicity + new symptomatic R cephalic SVT this week: On Rivaroxaban. Treating through 7/27. Holding aspirin while on anticoagulation. She may have some pulmonary HTN with her recurrent chest discomfort and known PEs. Will work to get her back in to see pulmonology since she missed a visit last month  5. Stroke sequelae on right side: We will work to reschedule PM&R to see if they may be able to better assist with her weakness. I do not think a surgical approach is warranted at this point. This visit has not yet occurred  6. Mental Health: Refilled arapiprazole and sertraline. Will try to get her re-initiated with Dr. Carter. Some of it is  related to treatment in ED. I have now added documentation specifically mentioning the incontinence into the Care Coordination note.  Follow up: 7/19 with Andrei Machado.      Review of external notes as documented above   Review of the result(s) of each unique test - labs as above  Diagnosis or treatment significantly limited by social determinants of health - sickle cell disease, racial inequity, difficult social situation at home  Ordering of specific tests    Virtual visit start: 1:18 pm  Virtual visit end: 1:37 pm  Duration of call: 19 minutes  35 min spent on the date of the encounter in chart review, patient visit, review of tests, documentation and/or discussion with other providers about the issues documented above.       Eric Duncan MD  Hematologist  Division of Hematology, Oncology, and Transplantation  [facility]  [facility]  Pager: [phone]

## 2021-07-12 NOTE — PROGRESS NOTES
This is a recent snapshot of the patient's [facility] medical record.  For current drug dose and complete information and questions, call [phone] or In Basket pool, [facility]v home infusion ([id number])  CSN Number:  [id number]

## 2021-07-12 NOTE — PROGRESS NOTES
Per visit instructions: schedule pt for daily infusion of IVF pain meds (M-F) labs once weekly until follow-up with Dr. Duncan on 7/26. First scheduled infusion is 7/13 at 10 am, called pt to inform and reached mahendra ESCALANTE that she should call to confirm she received the message, either speak with triage or ask to be transferred to writer. SW messaged to help arrange rides for tomorrow's visit and if pt needs to set up rides for the rest of the dates scheduled, to let writer and pt know.

## 2021-07-12 NOTE — ED PROVIDER NOTES
ED Provider Note  [facility]      History     Chief Complaint   Patient presents with     Sickle Cell Pain Crisis     HPI  Jennifer Cervantes is a 21 year old female with history of sickle cell disease, history of iron overload, cognitive and developmental delay, multiple subsegmental pulmonary emboli on rivaroxaban, and frequent emergency department visits for pain crises.    She presents today due to pain in her low back radiating to her flanks which is typical of her sickle cell crises.  Additionally, she notes she has some central chest pain which is pleuritic which is different than her typical sickle cell crises.  She has had pulmonary emboli before but is unsure if this seems similar to those episodes.    No fevers or chills no coughing.  No abdominal pain or vomiting.  No diarrhea.  Attempted her home rescue analgesic plan with poor results thus presenting to the emergency department today.    Past Medical History  Past Medical History:   Diagnosis Date     Anxiety      Bleeding disorder (H)      Blood clotting disorder (H)      Cerebral infarction (H) 2015     Cognitive developmental delay     low IQ. 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 (H)     right hand contractures     Iron overload due to repeated red blood cell transfusions      Migraines      Multiple subsegmental pulmonary emboli without acute cor pulmonale (H) 02/01/2021     Oppositional defiant behavior      Superficial venous thrombosis of arm, right 03/25/2021     Uncomplicated asthma      Past Surgical History:   Procedure Laterality Date     AS INSERT TUNNELED CV 2 CATH W/O PORT/PUMP       C BREAST REDUCTION (INCLUDES LIPO) TIER 3 Bilateral 04/23/2019     CHOLECYSTECTOMY       INSERT PORT VASCULAR ACCESS Left 4/21/2021    Procedure: INSERTION, VASCULAR ACCESS PORT (NOT SURE ON SIDE  UNTIL REMOVAL);  Surgeon: Rajan More MD;  Location: [facility] OR     IR CHEST PORT PLACEMENT > 5 YRS OF AGE  4/21/2021     IR CVC NON TUNNEL LINE REMOVAL  5/6/2021     IR CVC NON TUNNEL PLACEMENT  04/07/2020     IR CVC NON TUNNEL PLACEMENT  4/30/2021     IR PORT REMOVAL LEFT  4/21/2021     REMOVE PORT VASCULAR ACCESS Left 4/21/2021    Procedure: REMOVAL, VASCULAR ACCESS PORT LEFT;  Surgeon: Rajan More MD;  Location: [facility] OR     REPAIR TENDON ELBOW Right 10/02/2019    Procedure: Right Elbow Flexor Lengthening, Flexor Pronator Slide Of Wrist and Finger, Thumb Adductor Lengthening;  Surgeon: Anai Franco MD;  Location: [facility] OR     TONSILLECTOMY Bilateral 10/02/2019    Procedure: Bilateral Tonsillectomy;  Surgeon: Farhana Guy MD;  Location: [facility] OR     acetaminophen (TYLENOL) 325 MG tablet  albuterol (PROAIR HFA/PROVENTIL HFA/VENTOLIN HFA) 108 (90 Base) MCG/ACT inhaler  albuterol (PROVENTIL) (2.5 MG/3ML) 0.083% neb solution  ARIPiprazole (ABILIFY) 2 MG tablet  budesonide-formoterol (SYMBICORT) 160-4.5 MCG/ACT Inhaler  diclofenac (VOLTAREN) 1 % topical gel  diphenhydrAMINE (BENADRYL) 25 MG capsule  EPINEPHrine (ANY BX GENERIC EQUIV) 0.3 MG/0.3ML injection 2-pack  Hydroxyurea 1000 MG TABS  hydrOXYzine (ATARAX) 25 MG tablet  JADENU 360 MG tablet  lidocaine-prilocaine (EMLA) 2.5-2.5 % external cream  medroxyPROGESTERone (DEPO-PROVERA) 150 MG/ML IM injection  naloxone (NARCAN) 4 MG/0.1ML nasal spray  ondansetron (ZOFRAN) 8 MG tablet  oxyCODONE (OXYCONTIN) 10 MG 12 hr tablet  oxyCODONE IR (ROXICODONE) 15 MG tablet  rivaroxaban ANTICOAGULANT (XARELTO ANTICOAGULANT) 20 MG TABS tablet  sertraline (ZOLOFT) 100 MG tablet      Allergies   Allergen Reactions     Contrast Dye      Hives and breathing issues     Fish-Derived Products Hives     Seafood Hives     Diagnostic X-Ray Materials      Gadolinium      Family History  Family History   Problem Relation Age of Onset     Sickle Cell Trait Mother       "Hypertension Mother      Asthma Mother      Sickle Cell Trait Father      Social History   Social History     Tobacco Use     Smoking status: Never Smoker     Smokeless tobacco: Never Used   Substance Use Topics     Alcohol use: Not Currently     Alcohol/week: 0.0 standard drinks     Drug use: Never      Past medical history, past surgical history, medications, allergies, family history, and social history were reviewed with the patient. No additional pertinent items.       Review of Systems  A complete review of systems was performed with pertinent positives and negatives noted in the HPI, and all other systems negative.    Physical Exam   BP: 133/81  Pulse: 117  Temp: 98.2  F (36.8  C)  Resp: 20  Height: 162.6 cm (5' 4\")  Weight: 77.1 kg (170 lb)  SpO2: 93 %  Physical Exam  GEN: Well appearing, non toxic, cooperative and conversant.   HEENT: The head is normocephalic and atraumatic. Pupils are equal round and reactive to light. Extraocular motions are intact. There is no facial swelling. The neck is nontender and supple.   CV: Regular rate and rhythm without murmurs rubs or gallops. 2+ radial pulses bilaterally.  PULM: Clear to auscultation bilaterally.  ABD: Soft, nontender, nondistended.   EXT: Full range of motion.  No edema.  NEURO: Cranial nerves II through XII are intact and symmetric. Bilateral upper and lower extremities grossly show full range of motion without any focal deficits.   SKIN: No rashes, ecchymosis, or lacerations  PSYCH: Calm and cooperative, interactive.     ED Course      Procedures              Results for orders placed or performed during the hospital encounter of 07/12/21   XR Chest 2 Views     Status: None    Narrative    EXAM: XR CHEST 2 VW  LOCATION: [facility]  DATE/TIME: 7/12/2021 4:12 AM    INDICATION: Chest pain, SCC.  COMPARISON: 6/26/2021.      Impression    IMPRESSION: Normal heart size and pulmonary vascularity. Lungs are clear. Left-sided Port-A-Cath tip over " the SVC. No significant bony abnormalities. Otherwise unremarkable. No acute findings.               Comprehensive metabolic panel     Status: Abnormal   Result Value Ref Range    Sodium 137 133 - 144 mmol/L    Potassium 3.7 3.4 - 5.3 mmol/L    Chloride 108 94 - 109 mmol/L    Carbon Dioxide (CO2) 21 20 - 32 mmol/L    Anion Gap 8 3 - 14 mmol/L    Urea Nitrogen 6 (L) 7 - 30 mg/dL    Creatinine 0.58 0.52 - 1.04 mg/dL    Calcium 9.1 8.5 - 10.1 mg/dL    Glucose 93 70 - 99 mg/dL    Alkaline Phosphatase 72 40 - 150 U/L    AST 71 (H) 0 - 45 U/L    ALT 68 (H) 0 - 50 U/L    Protein Total 8.0 6.8 - 8.8 g/dL    Albumin 4.1 3.4 - 5.0 g/dL    Bilirubin Total 3.4 (H) 0.2 - 1.3 mg/dL    GFR Estimate >90 >60 mL/min/1.73m2   Lactic acid whole blood     Status: Normal   Result Value Ref Range    Lactic Acid 1.0 0.7 - 2.0 mmol/L   Reticulocyte count     Status: Abnormal   Result Value Ref Range    % Reticulocyte 27.1 (H) 0.5 - 2.0 %    Absolute Reticulocyte 0.691 (H) 0.025 - 0.095 10e6/uL   CBC with platelets and differential     Status: Abnormal   Result Value Ref Range    WBC Count 11.8 (H) 4.0 - 11.0 10e3/uL    RBC Count 2.55 (L) 3.80 - 5.20 10e6/uL    Hemoglobin 8.2 (L) 11.7 - 15.7 g/dL    Hematocrit 23.0 (L) 35.0 - 47.0 %    MCV 90 78 - 100 fL    MCH 32.2 26.5 - 33.0 pg    MCHC 35.7 31.5 - 36.5 g/dL    RDW 23.7 (H) 10.0 - 15.0 %    Platelet Count 292 150 - 450 10e3/uL   Manual Differential     Status: Abnormal   Result Value Ref Range    % Neutrophils 33 %    % Lymphocytes 66 %    % Monocytes 1 %    % Eosinophils 0 %    % Basophils 0 %    Absolute Neutrophils 3.9 1.6 - 8.3 10e3/uL    Absolute Lymphocytes 7.8 (H) 0.8 - 5.3 10e3/uL    Absolute Monocytes 0.1 0.0 - 1.3 10e3/uL    Absolute Eosinophils 0.0 0.0 - 0.7 10e3/uL    Absolute Basophils 0.0 0.0 - 0.2 10e3/uL    RBC Morphology Confirmed RBC Indices     Platelet Assessment  Automated Count Confirmed. Platelet morphology is normal.     Automated Count Confirmed. Platelet  morphology is normal.   CBC with platelets differential     Status: Abnormal    Narrative    The following orders were created for panel order CBC with platelets differential.  Procedure                               Abnormality         Status                     ---------                               -----------         ------                     CBC with platelets and d...[[id number]]  Abnormal            Final result               Manual Differential[[id number]]          Abnormal            Final result                 Please view results for these tests on the individual orders.     Medications   lactated ringers infusion (has no administration in time range)   ipratropium - albuterol 0.5 mg/2.5 mg/3 mL (DUONEB) 0.5-2.5 (3) MG/3ML neb solution (has no administration in time range)   morphine (PF) injection 2 mg (has no administration in time range)   morphine (PF) injection 2 mg (2 mg Intravenous Given 7/12/21 0557)   lactated ringers BOLUS 1,000 mL (1,000 mLs Intravenous New Bag 7/12/21 0344)   ondansetron (ZOFRAN) injection 8 mg (8 mg Intravenous Given 7/12/21 0344)   diphenhydrAMINE (BENADRYL) capsule 50 mg (50 mg Oral Given 7/12/21 0344)        Assessments & Plan (with Medical Decision Making)   21-year-old female with sickle cell disease and multiple medical problems presenting with chest pain and back pain as described.  DDx is broad including acute chest, pneumonia, sickle cell crisis,  Acute coronary syndrome, pulmonary embolism, pneumonia, pneumothorax, congestive heart failure, uremia, renal failure, among other causes    Clinically the patient is well-appearing, nontoxic but her chest pain is concerning given her history.  Her analgesic regimen was initiated  Labs reviewed and are notable for reticulocyte count of 27% similar to when she was in the ED 2 days ago.    Chest x-ray is clear  Due to the patient's unusual chest pain and pleurisy, VQ scan was ordered for evaluation of pulmonary embolism given  her contrast dye allergy.  Patient has made some headway with her analgesic plan and feels somewhat improved but not completely well.    Patient signed out to the morning attending with a plan for ongoing analgesic management while we await the VQ scan.  Disposition ultimately pending effective pain medications and results of VQ scan.    I have reviewed the nursing notes. I have reviewed the findings, diagnosis, plan and need for follow up with the patient.    New Prescriptions    No medications on file       Final diagnoses:   Sickle cell pain crisis (H)   Acute chest pain       --  Deo Schmid  [facility] EMERGENCY DEPARTMENT  7/12/2021     Deo Schmid MD  07/12/21 0635

## 2021-07-12 NOTE — PROGRESS NOTES
Jennifer is a 21 year old who is being evaluated via a billable video visit.      How would you like to obtain your AVS? MyChart     If the video visit is dropped, the invitation should be resent by: Text to cell phone: [phone]     Will anyone else be joining your video visit? No          Vitals - Patient Reported  Pain Score: Severe Pain (6)  Pain Loc: Other - see comment (ALL OVER)    Patient was at ER today. Is at home now. Pain level 6/10 all over.    Shahriar Sheldon LPN      Video-Visit Details    Type of service:  Video Visit  Video Start Time: 1:18 pm  Video End Time:1:37 pm    Originating Location (pt. Location): Home    Distant Location (provider location):  [facility]     Platform used for Video Visit: [employer]

## 2021-07-12 NOTE — ED NOTES
"Upon writer entering room inhaler offered to patient.  \"I don't want that.  I have one in my purse.\"  Attempted to explain to patient that she should use inhaler and if that treatment did not provide relief then the ER MD could re evaluate for possible neb administration.  She was unwilling to try this.  At this point the patient stated \"Take this out before I slap that fuck outta somebody\".  She was unwilling to sign the AMA form stating \"you all are incompetent up in here\".  Jennifer then looked at my ID badge and repeated my name \"I'm going to make a complaint about you\".  PIV was removed as patient requested.  She declined assistance with dressing.  Ambulatory upon exiting the ED with all belongings.  "

## 2021-07-12 NOTE — PROGRESS NOTES
Per Dr. Duncan: add on for 1 pm video visit today. Pt has been in ED 6 times since beginning of July and pt had called last week asking for earlier visit with either Dr. Duncan or Andrei HIGGINS.    Called pt and she accepted video visit today at 1 pm, aware check in call will occur approximately 30 minutes prior, scheduling messaged.

## 2021-07-13 ENCOUNTER — ONCOLOGY VISIT (OUTPATIENT)
Dept: ONCOLOGY | Facility: CLINIC | Age: 21
DRG: 871 | End: 2021-07-13
Attending: PHYSICIAN ASSISTANT
Payer: COMMERCIAL

## 2021-07-13 ENCOUNTER — ANCILLARY PROCEDURE (OUTPATIENT)
Dept: GENERAL RADIOLOGY | Facility: CLINIC | Age: 21
End: 2021-07-13
Attending: PHYSICIAN ASSISTANT
Payer: COMMERCIAL

## 2021-07-13 ENCOUNTER — HOSPITAL ENCOUNTER (INPATIENT)
Facility: CLINIC | Age: 21
LOS: 12 days | Discharge: HOME | DRG: 871 | End: 2021-07-25
Attending: EMERGENCY MEDICINE | Admitting: INTERNAL MEDICINE
Payer: COMMERCIAL

## 2021-07-13 ENCOUNTER — APPOINTMENT (OUTPATIENT)
Dept: NUCLEAR MEDICINE | Facility: CLINIC | Age: 21
DRG: 871 | End: 2021-07-13
Attending: EMERGENCY MEDICINE
Payer: COMMERCIAL

## 2021-07-13 ENCOUNTER — PATIENT OUTREACH (OUTPATIENT)
Dept: ONCOLOGY | Facility: CLINIC | Age: 21
End: 2021-07-13

## 2021-07-13 ENCOUNTER — APPOINTMENT (OUTPATIENT)
Dept: GENERAL RADIOLOGY | Facility: CLINIC | Age: 21
DRG: 871 | End: 2021-07-13
Attending: EMERGENCY MEDICINE
Payer: COMMERCIAL

## 2021-07-13 ENCOUNTER — PATIENT OUTREACH (OUTPATIENT)
Dept: CARE COORDINATION | Facility: CLINIC | Age: 21
End: 2021-07-13

## 2021-07-13 ENCOUNTER — TELEPHONE (OUTPATIENT)
Dept: ONCOLOGY | Facility: CLINIC | Age: 21
End: 2021-07-13

## 2021-07-13 ENCOUNTER — INFUSION THERAPY VISIT (OUTPATIENT)
Dept: INFUSION THERAPY | Facility: CLINIC | Age: 21
DRG: 871 | End: 2021-07-13
Attending: PEDIATRICS
Payer: COMMERCIAL

## 2021-07-13 VITALS
SYSTOLIC BLOOD PRESSURE: 127 MMHG | OXYGEN SATURATION: 95 % | HEART RATE: 120 BPM | DIASTOLIC BLOOD PRESSURE: 73 MMHG | RESPIRATION RATE: 24 BRPM | TEMPERATURE: 98.1 F

## 2021-07-13 DIAGNOSIS — I26.94 MULTIPLE SUBSEGMENTAL PULMONARY EMBOLI WITHOUT ACUTE COR PULMONALE (H): ICD-10-CM

## 2021-07-13 DIAGNOSIS — I26.99 PULMONARY EMBOLISM WITHOUT ACUTE COR PULMONALE, UNSPECIFIED CHRONICITY, UNSPECIFIED PULMONARY EMBOLISM TYPE (H): ICD-10-CM

## 2021-07-13 DIAGNOSIS — D57.00 HB-SS DISEASE WITH CRISIS (H): ICD-10-CM

## 2021-07-13 DIAGNOSIS — I82.611 SUPERFICIAL VENOUS THROMBOSIS OF ARM, RIGHT: Primary | ICD-10-CM

## 2021-07-13 DIAGNOSIS — D57.00 SICKLE CELL PAIN CRISIS (H): Primary | ICD-10-CM

## 2021-07-13 DIAGNOSIS — Z86.73 HISTORY OF STROKE: ICD-10-CM

## 2021-07-13 DIAGNOSIS — E83.111 IRON OVERLOAD DUE TO REPEATED RED BLOOD CELL TRANSFUSIONS: ICD-10-CM

## 2021-07-13 DIAGNOSIS — D57.00 SICKLE CELL PAIN CRISIS (H): ICD-10-CM

## 2021-07-13 DIAGNOSIS — D57.00 SICKLE CELL CRISIS (H): ICD-10-CM

## 2021-07-13 DIAGNOSIS — D57.1 HB-SS DISEASE WITHOUT CRISIS (H): ICD-10-CM

## 2021-07-13 DIAGNOSIS — I26.99 PULMONARY INFARCTION (H): ICD-10-CM

## 2021-07-13 DIAGNOSIS — J18.9 ATYPICAL PNEUMONIA: ICD-10-CM

## 2021-07-13 DIAGNOSIS — Z11.52 ENCOUNTER FOR SCREENING LABORATORY TESTING FOR SEVERE ACUTE RESPIRATORY SYNDROME CORONAVIRUS 2 (SARS-COV-2): ICD-10-CM

## 2021-07-13 DIAGNOSIS — Z79.01 LONG TERM (CURRENT) USE OF ANTICOAGULANTS: ICD-10-CM

## 2021-07-13 DIAGNOSIS — R09.02 HYPOXIA: ICD-10-CM

## 2021-07-13 DIAGNOSIS — J96.01 ACUTE RESPIRATORY FAILURE WITH HYPOXIA (H): ICD-10-CM

## 2021-07-13 LAB
ABO/RH(D): NORMAL
ALBUMIN SERPL-MCNC: 4.1 G/DL (ref 3.4–5)
ALBUMIN SERPL-MCNC: 4.3 G/DL (ref 3.4–5)
ALP SERPL-CCNC: 73 U/L (ref 40–150)
ALP SERPL-CCNC: 73 U/L (ref 40–150)
ALT SERPL W P-5'-P-CCNC: 65 U/L (ref 0–50)
ALT SERPL W P-5'-P-CCNC: 72 U/L (ref 0–50)
ANION GAP SERPL CALCULATED.3IONS-SCNC: 7 MMOL/L (ref 3–14)
ANION GAP SERPL CALCULATED.3IONS-SCNC: 7 MMOL/L (ref 3–14)
ANTIBODY SCREEN: NEGATIVE
AST SERPL W P-5'-P-CCNC: 68 U/L (ref 0–45)
AST SERPL W P-5'-P-CCNC: 74 U/L (ref 0–45)
BASOPHILS # BLD AUTO: 0.2 10E3/UL (ref 0–0.2)
BASOPHILS # BLD AUTO: 0.2 10E3/UL (ref 0–0.2)
BASOPHILS NFR BLD AUTO: 1 %
BASOPHILS NFR BLD AUTO: 2 %
BILIRUB SERPL-MCNC: 6.3 MG/DL (ref 0.2–1.3)
BILIRUB SERPL-MCNC: 7.2 MG/DL (ref 0.2–1.3)
BLD PROD TYP BPU: NORMAL
BLOOD COMPONENT TYPE: NORMAL
BUN SERPL-MCNC: 6 MG/DL (ref 7–30)
BUN SERPL-MCNC: 6 MG/DL (ref 7–30)
CALCIUM SERPL-MCNC: 8.5 MG/DL (ref 8.5–10.1)
CALCIUM SERPL-MCNC: 8.7 MG/DL (ref 8.5–10.1)
CHLORIDE BLD-SCNC: 108 MMOL/L (ref 94–109)
CHLORIDE BLD-SCNC: 111 MMOL/L (ref 94–109)
CO2 SERPL-SCNC: 20 MMOL/L (ref 20–32)
CO2 SERPL-SCNC: 21 MMOL/L (ref 20–32)
CODING SYSTEM: NORMAL
CREAT SERPL-MCNC: 0.6 MG/DL (ref 0.52–1.04)
CREAT SERPL-MCNC: 0.65 MG/DL (ref 0.52–1.04)
CROSSMATCH: NORMAL
EOSINOPHIL # BLD AUTO: 1.4 10E3/UL (ref 0–0.7)
EOSINOPHIL # BLD AUTO: 1.8 10E3/UL (ref 0–0.7)
EOSINOPHIL NFR BLD AUTO: 12 %
EOSINOPHIL NFR BLD AUTO: 9 %
ERYTHROCYTE [DISTWIDTH] IN BLOOD BY AUTOMATED COUNT: 21.4 % (ref 10–15)
ERYTHROCYTE [DISTWIDTH] IN BLOOD BY AUTOMATED COUNT: 22.2 % (ref 10–15)
GFR SERPL CREATININE-BSD FRML MDRD: >90 ML/MIN/1.73M2
GFR SERPL CREATININE-BSD FRML MDRD: >90 ML/MIN/1.73M2
GLUCOSE BLD-MCNC: 106 MG/DL (ref 70–99)
GLUCOSE BLD-MCNC: 85 MG/DL (ref 70–99)
HCT VFR BLD AUTO: 21.2 % (ref 35–47)
HCT VFR BLD AUTO: 21.2 % (ref 35–47)
HGB BLD-MCNC: 7.3 G/DL (ref 11.7–15.7)
HGB BLD-MCNC: 7.4 G/DL (ref 11.7–15.7)
HOLD SPECIMEN: NORMAL
IMM GRANULOCYTES # BLD: 0.1 10E3/UL
IMM GRANULOCYTES # BLD: 0.1 10E3/UL
IMM GRANULOCYTES NFR BLD: 1 %
IMM GRANULOCYTES NFR BLD: 1 %
ISSUE DATE AND TIME: NORMAL
LACTATE SERPL-SCNC: 0.8 MMOL/L (ref 0.7–2)
LYMPHOCYTES # BLD AUTO: 1.7 10E3/UL (ref 0.8–5.3)
LYMPHOCYTES # BLD AUTO: 2.2 10E3/UL (ref 0.8–5.3)
LYMPHOCYTES NFR BLD AUTO: 11 %
LYMPHOCYTES NFR BLD AUTO: 15 %
MCH RBC QN AUTO: 30.7 PG (ref 26.5–33)
MCH RBC QN AUTO: 31.2 PG (ref 26.5–33)
MCHC RBC AUTO-ENTMCNC: 34.4 G/DL (ref 31.5–36.5)
MCHC RBC AUTO-ENTMCNC: 34.9 G/DL (ref 31.5–36.5)
MCV RBC AUTO: 89 FL (ref 78–100)
MCV RBC AUTO: 90 FL (ref 78–100)
MONOCYTES # BLD AUTO: 1.3 10E3/UL (ref 0–1.3)
MONOCYTES # BLD AUTO: 1.4 10E3/UL (ref 0–1.3)
MONOCYTES NFR BLD AUTO: 10 %
MONOCYTES NFR BLD AUTO: 9 %
NEUTROPHILS # BLD AUTO: 10.4 10E3/UL (ref 1.6–8.3)
NEUTROPHILS # BLD AUTO: 8.6 10E3/UL (ref 1.6–8.3)
NEUTROPHILS NFR BLD AUTO: 60 %
NEUTROPHILS NFR BLD AUTO: 69 %
NRBC # BLD AUTO: 0.4 10E3/UL
NRBC # BLD AUTO: 0.4 10E3/UL
NRBC BLD AUTO-RTO: 3 /100
NRBC BLD AUTO-RTO: 3 /100
PLATELET # BLD AUTO: 268 10E3/UL (ref 150–450)
PLATELET # BLD AUTO: 274 10E3/UL (ref 150–450)
POTASSIUM BLD-SCNC: 3.2 MMOL/L (ref 3.4–5.3)
POTASSIUM BLD-SCNC: 3.3 MMOL/L (ref 3.4–5.3)
PROCALCITONIN SERPL-MCNC: 0.3 NG/ML
PROT SERPL-MCNC: 7.9 G/DL (ref 6.8–8.8)
PROT SERPL-MCNC: 8.1 G/DL (ref 6.8–8.8)
RBC # BLD AUTO: 2.37 10E6/UL (ref 3.8–5.2)
RBC # BLD AUTO: 2.38 10E6/UL (ref 3.8–5.2)
RETICS # AUTO: 0.67 10E6/UL (ref 0.03–0.1)
RETICS/RBC NFR AUTO: 28.1 % (ref 0.5–2)
SARS-COV-2 RNA RESP QL NAA+PROBE: NEGATIVE
SODIUM SERPL-SCNC: 136 MMOL/L (ref 133–144)
SODIUM SERPL-SCNC: 138 MMOL/L (ref 133–144)
SPECIMEN EXPIRATION DATE: NORMAL
UNIT ABO/RH: NORMAL
UNIT NUMBER: NORMAL
UNIT STATUS: NORMAL
UNIT TYPE ISBT: 5100
WBC # BLD AUTO: 14.3 10E3/UL (ref 4–11)
WBC # BLD AUTO: 15 10E3/UL (ref 4–11)

## 2021-07-13 PROCEDURE — 99214 OFFICE O/P EST MOD 30 MIN: CPT | Performed by: PHYSICIAN ASSISTANT

## 2021-07-13 PROCEDURE — 250N000011 HC RX IP 250 OP 636: Performed by: PEDIATRICS

## 2021-07-13 PROCEDURE — 83605 ASSAY OF LACTIC ACID: CPT | Performed by: EMERGENCY MEDICINE

## 2021-07-13 PROCEDURE — 86900 BLOOD TYPING SEROLOGIC ABO: CPT | Performed by: INTERNAL MEDICINE

## 2021-07-13 PROCEDURE — 94640 AIRWAY INHALATION TREATMENT: CPT | Performed by: EMERGENCY MEDICINE

## 2021-07-13 PROCEDURE — A9540 TC99M MAA: HCPCS | Performed by: INTERNAL MEDICINE

## 2021-07-13 PROCEDURE — 71046 X-RAY EXAM CHEST 2 VIEWS: CPT | Mod: GC | Performed by: RADIOLOGY

## 2021-07-13 PROCEDURE — 78580 LUNG PERFUSION IMAGING: CPT

## 2021-07-13 PROCEDURE — 96361 HYDRATE IV INFUSION ADD-ON: CPT

## 2021-07-13 PROCEDURE — 87040 BLOOD CULTURE FOR BACTERIA: CPT | Performed by: EMERGENCY MEDICINE

## 2021-07-13 PROCEDURE — 999N000127 HC STATISTIC PERIPHERAL IV START W US GUIDANCE

## 2021-07-13 PROCEDURE — 93005 ELECTROCARDIOGRAM TRACING: CPT | Performed by: EMERGENCY MEDICINE

## 2021-07-13 PROCEDURE — 250N000013 HC RX MED GY IP 250 OP 250 PS 637: Performed by: PHYSICIAN ASSISTANT

## 2021-07-13 PROCEDURE — C9803 HOPD COVID-19 SPEC COLLECT: HCPCS | Performed by: EMERGENCY MEDICINE

## 2021-07-13 PROCEDURE — 84450 TRANSFERASE (AST) (SGOT): CPT | Performed by: EMERGENCY MEDICINE

## 2021-07-13 PROCEDURE — 85004 AUTOMATED DIFF WBC COUNT: CPT | Performed by: EMERGENCY MEDICINE

## 2021-07-13 PROCEDURE — 343N000001 HC RX 343: Performed by: INTERNAL MEDICINE

## 2021-07-13 PROCEDURE — 258N000003 HC RX IP 258 OP 636: Performed by: EMERGENCY MEDICINE

## 2021-07-13 PROCEDURE — 99285 EMERGENCY DEPT VISIT HI MDM: CPT | Mod: 25 | Performed by: EMERGENCY MEDICINE

## 2021-07-13 PROCEDURE — 96365 THER/PROPH/DIAG IV INF INIT: CPT | Mod: 59 | Performed by: EMERGENCY MEDICINE

## 2021-07-13 PROCEDURE — 71045 X-RAY EXAM CHEST 1 VIEW: CPT | Mod: 26 | Performed by: RADIOLOGY

## 2021-07-13 PROCEDURE — 99207 PR APP CREDIT; MD BILLING SHARED VISIT: CPT | Performed by: PHYSICIAN ASSISTANT

## 2021-07-13 PROCEDURE — 36592 COLLECT BLOOD FROM PICC: CPT | Performed by: EMERGENCY MEDICINE

## 2021-07-13 PROCEDURE — U0005 INFEC AGEN DETEC AMPLI PROBE: HCPCS | Performed by: EMERGENCY MEDICINE

## 2021-07-13 PROCEDURE — 120N000002 HC R&B MED SURG/OB UMMC

## 2021-07-13 PROCEDURE — 78580 LUNG PERFUSION IMAGING: CPT | Mod: 26 | Performed by: RADIOLOGY

## 2021-07-13 PROCEDURE — 84145 PROCALCITONIN (PCT): CPT | Performed by: EMERGENCY MEDICINE

## 2021-07-13 PROCEDURE — 96376 TX/PRO/DX INJ SAME DRUG ADON: CPT

## 2021-07-13 PROCEDURE — 96376 TX/PRO/DX INJ SAME DRUG ADON: CPT | Performed by: EMERGENCY MEDICINE

## 2021-07-13 PROCEDURE — 99207 PR CDG-MDM COMPONENT: MEETS HIGH - UP CODED: CPT | Performed by: INTERNAL MEDICINE

## 2021-07-13 PROCEDURE — 93010 ELECTROCARDIOGRAM REPORT: CPT | Performed by: EMERGENCY MEDICINE

## 2021-07-13 PROCEDURE — 250N000011 HC RX IP 250 OP 636: Performed by: EMERGENCY MEDICINE

## 2021-07-13 PROCEDURE — 84155 ASSAY OF PROTEIN SERUM: CPT | Performed by: EMERGENCY MEDICINE

## 2021-07-13 PROCEDURE — 80053 COMPREHEN METABOLIC PANEL: CPT

## 2021-07-13 PROCEDURE — 96375 TX/PRO/DX INJ NEW DRUG ADDON: CPT | Performed by: EMERGENCY MEDICINE

## 2021-07-13 PROCEDURE — 85025 COMPLETE CBC W/AUTO DIFF WBC: CPT

## 2021-07-13 PROCEDURE — 250N000011 HC RX IP 250 OP 636: Performed by: PHYSICIAN ASSISTANT

## 2021-07-13 PROCEDURE — 250N000013 HC RX MED GY IP 250 OP 250 PS 637: Performed by: EMERGENCY MEDICINE

## 2021-07-13 PROCEDURE — 258N000003 HC RX IP 258 OP 636: Performed by: PEDIATRICS

## 2021-07-13 PROCEDURE — 99223 1ST HOSP IP/OBS HIGH 75: CPT | Mod: AI | Performed by: INTERNAL MEDICINE

## 2021-07-13 PROCEDURE — 85045 AUTOMATED RETICULOCYTE COUNT: CPT | Performed by: EMERGENCY MEDICINE

## 2021-07-13 PROCEDURE — 96374 THER/PROPH/DIAG INJ IV PUSH: CPT

## 2021-07-13 PROCEDURE — 36415 COLL VENOUS BLD VENIPUNCTURE: CPT

## 2021-07-13 PROCEDURE — 71045 X-RAY EXAM CHEST 1 VIEW: CPT

## 2021-07-13 PROCEDURE — 96366 THER/PROPH/DIAG IV INF ADDON: CPT | Performed by: EMERGENCY MEDICINE

## 2021-07-13 RX ORDER — SERTRALINE HYDROCHLORIDE 100 MG/1
200 TABLET, FILM COATED ORAL DAILY
Status: DISCONTINUED | OUTPATIENT
Start: 2021-07-13 | End: 2021-07-25 | Stop reason: HOSPADM

## 2021-07-13 RX ORDER — MORPHINE SULFATE 2 MG/ML
2 INJECTION, SOLUTION INTRAMUSCULAR; INTRAVENOUS
Status: CANCELLED
Start: 2021-07-13

## 2021-07-13 RX ORDER — ONDANSETRON 8 MG/1
8 TABLET, FILM COATED ORAL
Status: CANCELLED
Start: 2021-07-13

## 2021-07-13 RX ORDER — ACETAMINOPHEN 325 MG/1
650 TABLET ORAL EVERY 6 HOURS PRN
Status: DISCONTINUED | OUTPATIENT
Start: 2021-07-13 | End: 2021-07-25 | Stop reason: HOSPADM

## 2021-07-13 RX ORDER — ARIPIPRAZOLE 2 MG/1
2 TABLET ORAL DAILY
Status: DISCONTINUED | OUTPATIENT
Start: 2021-07-13 | End: 2021-07-25 | Stop reason: HOSPADM

## 2021-07-13 RX ORDER — HEPARIN SODIUM,PORCINE 10 UNIT/ML
5 VIAL (ML) INTRAVENOUS
Status: CANCELLED | OUTPATIENT
Start: 2021-07-13

## 2021-07-13 RX ORDER — PROCHLORPERAZINE 25 MG
25 SUPPOSITORY, RECTAL RECTAL EVERY 12 HOURS PRN
Status: DISCONTINUED | OUTPATIENT
Start: 2021-07-13 | End: 2021-07-25 | Stop reason: HOSPADM

## 2021-07-13 RX ORDER — POTASSIUM CHLORIDE 750 MG/1
40 TABLET, EXTENDED RELEASE ORAL ONCE
Status: COMPLETED | OUTPATIENT
Start: 2021-07-13 | End: 2021-07-13

## 2021-07-13 RX ORDER — DIPHENHYDRAMINE HCL 25 MG
25-50 CAPSULE ORAL
Status: DISCONTINUED | OUTPATIENT
Start: 2021-07-13 | End: 2021-07-25 | Stop reason: HOSPADM

## 2021-07-13 RX ORDER — ALBUTEROL SULFATE 90 UG/1
2 AEROSOL, METERED RESPIRATORY (INHALATION) EVERY 6 HOURS PRN
Status: DISCONTINUED | OUTPATIENT
Start: 2021-07-13 | End: 2021-07-13 | Stop reason: HOSPADM

## 2021-07-13 RX ORDER — PROCHLORPERAZINE MALEATE 5 MG
10 TABLET ORAL EVERY 6 HOURS PRN
Status: DISCONTINUED | OUTPATIENT
Start: 2021-07-13 | End: 2021-07-25 | Stop reason: HOSPADM

## 2021-07-13 RX ORDER — ALBUTEROL SULFATE 0.83 MG/ML
2.5 SOLUTION RESPIRATORY (INHALATION) EVERY 6 HOURS PRN
Status: DISCONTINUED | OUTPATIENT
Start: 2021-07-13 | End: 2021-07-20

## 2021-07-13 RX ORDER — MORPHINE SULFATE 2 MG/ML
2 INJECTION, SOLUTION INTRAMUSCULAR; INTRAVENOUS ONCE
Status: COMPLETED | OUTPATIENT
Start: 2021-07-13 | End: 2021-07-13

## 2021-07-13 RX ORDER — DEFERASIROX 360 MG/1
1440 TABLET, FILM COATED ORAL EVERY EVENING
Status: DISCONTINUED | OUTPATIENT
Start: 2021-07-14 | End: 2021-07-21 | Stop reason: RX

## 2021-07-13 RX ORDER — ALBUTEROL SULFATE 90 UG/1
6-8 AEROSOL, METERED RESPIRATORY (INHALATION) ONCE
Status: COMPLETED | OUTPATIENT
Start: 2021-07-13 | End: 2021-07-13

## 2021-07-13 RX ORDER — LIDOCAINE 40 MG/G
CREAM TOPICAL
Status: DISCONTINUED | OUTPATIENT
Start: 2021-07-13 | End: 2021-07-25 | Stop reason: HOSPADM

## 2021-07-13 RX ORDER — AMOXICILLIN 250 MG
1 CAPSULE ORAL 2 TIMES DAILY
Status: DISCONTINUED | OUTPATIENT
Start: 2021-07-13 | End: 2021-07-25 | Stop reason: HOSPADM

## 2021-07-13 RX ORDER — HEPARIN SODIUM (PORCINE) LOCK FLUSH IV SOLN 100 UNIT/ML 100 UNIT/ML
5 SOLUTION INTRAVENOUS
Status: DISCONTINUED | OUTPATIENT
Start: 2021-07-13 | End: 2021-07-13 | Stop reason: HOSPADM

## 2021-07-13 RX ORDER — POLYETHYLENE GLYCOL 3350 17 G/17G
17 POWDER, FOR SOLUTION ORAL DAILY
Status: DISCONTINUED | OUTPATIENT
Start: 2021-07-13 | End: 2021-07-25 | Stop reason: HOSPADM

## 2021-07-13 RX ORDER — ONDANSETRON 4 MG/1
4 TABLET, ORALLY DISINTEGRATING ORAL EVERY 6 HOURS PRN
Status: DISCONTINUED | OUTPATIENT
Start: 2021-07-13 | End: 2021-07-25 | Stop reason: HOSPADM

## 2021-07-13 RX ORDER — ONDANSETRON 2 MG/ML
4 INJECTION INTRAMUSCULAR; INTRAVENOUS EVERY 6 HOURS PRN
Status: DISCONTINUED | OUTPATIENT
Start: 2021-07-13 | End: 2021-07-25 | Stop reason: HOSPADM

## 2021-07-13 RX ORDER — HEPARIN SODIUM 10000 [USP'U]/100ML
0-5000 INJECTION, SOLUTION INTRAVENOUS CONTINUOUS
Status: DISPENSED | OUTPATIENT
Start: 2021-07-13 | End: 2021-07-15

## 2021-07-13 RX ORDER — HYDROXYUREA 500 MG/1
2000 CAPSULE ORAL DAILY
Status: DISCONTINUED | OUTPATIENT
Start: 2021-07-14 | End: 2021-07-25 | Stop reason: HOSPADM

## 2021-07-13 RX ORDER — HYDROXYZINE HYDROCHLORIDE 25 MG/1
25 TABLET, FILM COATED ORAL 3 TIMES DAILY PRN
Status: DISCONTINUED | OUTPATIENT
Start: 2021-07-13 | End: 2021-07-25 | Stop reason: HOSPADM

## 2021-07-13 RX ORDER — ALBUTEROL SULFATE 90 UG/1
2 AEROSOL, METERED RESPIRATORY (INHALATION) EVERY 6 HOURS PRN
Status: DISCONTINUED | OUTPATIENT
Start: 2021-07-13 | End: 2021-07-25 | Stop reason: HOSPADM

## 2021-07-13 RX ORDER — MORPHINE SULFATE 2 MG/ML
2 INJECTION, SOLUTION INTRAMUSCULAR; INTRAVENOUS
Status: COMPLETED | OUTPATIENT
Start: 2021-07-13 | End: 2021-07-13

## 2021-07-13 RX ORDER — OXYCODONE HCL 10 MG/1
10 TABLET, FILM COATED, EXTENDED RELEASE ORAL EVERY 12 HOURS
Status: DISCONTINUED | OUTPATIENT
Start: 2021-07-13 | End: 2021-07-19

## 2021-07-13 RX ORDER — DIPHENHYDRAMINE HCL 25 MG
25 CAPSULE ORAL
Status: CANCELLED
Start: 2021-07-13

## 2021-07-13 RX ORDER — HEPARIN SODIUM (PORCINE) LOCK FLUSH IV SOLN 100 UNIT/ML 100 UNIT/ML
5 SOLUTION INTRAVENOUS
Status: CANCELLED | OUTPATIENT
Start: 2021-07-13

## 2021-07-13 RX ORDER — SODIUM CHLORIDE, SODIUM LACTATE, POTASSIUM CHLORIDE, CALCIUM CHLORIDE 600; 310; 30; 20 MG/100ML; MG/100ML; MG/100ML; MG/100ML
INJECTION, SOLUTION INTRAVENOUS CONTINUOUS
Status: DISCONTINUED | OUTPATIENT
Start: 2021-07-13 | End: 2021-07-19

## 2021-07-13 RX ORDER — AMOXICILLIN 250 MG
2 CAPSULE ORAL 2 TIMES DAILY
Status: DISCONTINUED | OUTPATIENT
Start: 2021-07-13 | End: 2021-07-25 | Stop reason: HOSPADM

## 2021-07-13 RX ADMIN — MORPHINE SULFATE 2 MG: 2 INJECTION, SOLUTION INTRAMUSCULAR; INTRAVENOUS at 17:41

## 2021-07-13 RX ADMIN — KIT FOR THE PREPARATION OF TECHNETIUM TC 99M ALBUMIN AGGREGATED 7 MCI.: 2.5 INJECTION, POWDER, FOR SOLUTION INTRAVENOUS at 22:17

## 2021-07-13 RX ADMIN — POTASSIUM CHLORIDE 40 MEQ: 750 TABLET, EXTENDED RELEASE ORAL at 21:09

## 2021-07-13 RX ADMIN — ALBUTEROL SULFATE 6 PUFF: 90 AEROSOL, METERED RESPIRATORY (INHALATION) at 17:42

## 2021-07-13 RX ADMIN — Medication 5 ML: at 13:53

## 2021-07-13 RX ADMIN — VANCOMYCIN HYDROCHLORIDE 2000 MG: 1 INJECTION, POWDER, LYOPHILIZED, FOR SOLUTION INTRAVENOUS at 21:08

## 2021-07-13 RX ADMIN — SODIUM CHLORIDE, POTASSIUM CHLORIDE, SODIUM LACTATE AND CALCIUM CHLORIDE: 600; 310; 30; 20 INJECTION, SOLUTION INTRAVENOUS at 18:24

## 2021-07-13 RX ADMIN — MORPHINE SULFATE 2 MG: 2 INJECTION, SOLUTION INTRAMUSCULAR; INTRAVENOUS at 10:31

## 2021-07-13 RX ADMIN — DEXTROSE AND SODIUM CHLORIDE 500 ML: 5; 450 INJECTION, SOLUTION INTRAVENOUS at 10:32

## 2021-07-13 RX ADMIN — Medication: at 20:06

## 2021-07-13 RX ADMIN — ONDANSETRON 4 MG: 2 INJECTION INTRAMUSCULAR; INTRAVENOUS at 21:33

## 2021-07-13 RX ADMIN — CEFEPIME 2 G: 2 INJECTION, POWDER, FOR SOLUTION INTRAVENOUS at 18:56

## 2021-07-13 RX ADMIN — ALBUTEROL SULFATE 2 PUFF: 90 AEROSOL, METERED RESPIRATORY (INHALATION) at 11:36

## 2021-07-13 RX ADMIN — MORPHINE SULFATE 2 MG: 2 INJECTION, SOLUTION INTRAMUSCULAR; INTRAVENOUS at 11:30

## 2021-07-13 RX ADMIN — MORPHINE SULFATE 2 MG: 2 INJECTION, SOLUTION INTRAMUSCULAR; INTRAVENOUS at 13:19

## 2021-07-13 ASSESSMENT — ENCOUNTER SYMPTOMS
ABDOMINAL PAIN: 0
WHEEZING: 1
FATIGUE: 1
ARTHRALGIAS: 0
FEVER: 0
RHINORRHEA: 1
FLANK PAIN: 1
CONFUSION: 0
EYE REDNESS: 0
DIARRHEA: 0
SHORTNESS OF BREATH: 1
NAUSEA: 0
DIFFICULTY URINATING: 0
HEADACHES: 0
SORE THROAT: 1
COLOR CHANGE: 0
CHILLS: 0
VOMITING: 0
NECK STIFFNESS: 0
COUGH: 1
MYALGIAS: 1

## 2021-07-13 ASSESSMENT — PAIN SCALES - GENERAL
PAINLEVEL: EXTREME PAIN (8)
PAINLEVEL: EXTREME PAIN (9)

## 2021-07-13 NOTE — ED NOTES
"Pt was verbally abusive and threatening to this RN. Upon entering her room to draw labs, she stated \"' I have been fucking calling for help and you guys have been fucking ignoring me.\" I told her that was no way to speak to me and I was sorry no one told me she needed the bathroom but I was here now to assist her. Pt replied \" I am going to have my mother come here to deal with you guys.\" I asked what is she expecting her mother to do with us? Pt proceeded to call her mother while standing in the room and told her to \"come and handle this fucking nurse that took care of me when my IV came out.\" I asked pt if that was a threat? She walked into the bathroom without replying. Charge nurse notified. Requested another RN continue pt care.  "

## 2021-07-13 NOTE — ED TRIAGE NOTES
Pt BIBA from [facility] for Sickle Cell Pain Crisis. Pt was hypoxic at clinic. Was placed on 2L at clinic. Was taken off of NC, dropped down to 80%. Was given Morphine x2 at clinic. Trailed off of oxygen in triaged, desatted to 87%. 2L applied. .

## 2021-07-13 NOTE — PROGRESS NOTES
Attempted to meet with pt in clinic to discuss referrals and apts needed with Pulmonology, Physical Medicine and Rehab, Audiology and Mental Health with Dr. Daya Rice at [facility]. Pt here getting her infusion in [facility] today, called and spoke with infusion RN Neyda. Was told pt's O2 sating low and ANDREAS currently there assessing. Pt requesting to go to ED so unsure what plan is for now. Informed her will call back later to find out if pt transferred to ED or not.

## 2021-07-13 NOTE — LETTER
"    7/13/2021         RE: Jennifer Cervantes  [address]  [place] [address]        Dear Colleague,    Thank you for referring your patient, Jennifer Cervantes, to the [facility]. Please see a copy of my visit note below.    Infusion Nursing Note:  Jennifer Cervantes presents today for IVF and pain medication.    Patient seen by provider today: Yes - see below.   present during visit today: Not Applicable.    Note: O2 SATS on arrival, RA = 86-88%. Patient reports her \"asthma has been flaring up the past couple of days, she denies SOB - does have a cough. RONALDO Forbes notified via page - Andrei at bedside to assess. Albuterol inhaler ordered, patient self administered 2 puffs. CXR ordered, patient walked independently down to imaging, declined portable O2. SATS upon arrival from imaging 80% on RA, and after albuterol continued to be 86-90%.   RONALDO Forbes had long [facility], and the importance of needing O2 at this point. Patient adamant about going home first to  clothes, and declined. Fluids and morphine doses given. Patient left [facility] in stable condition, reports she will go get clothes and report to the ED.     ADDENDUM: Patient decided to take non-emergent transport. Called and arranged at 1445. Report given to JOE Arnold who resumed care at 1515.    Intravenous Access:  Implanted Port.    Treatment Conditions:  Not Applicable.      Post Infusion Assessment:  Patient tolerated infusion without incident.  Site patent and intact, free from redness, edema or discomfort.  No evidence of extravasations.  Access discontinued per protocol.     Administrations This Visit     albuterol (PROAIR HFA/PROVENTIL HFA/VENTOLIN HFA) 108 (90 Base) MCG/ACT inhaler 2 puff     Admin Date  07/13/2021 Action  Given Dose  2 puff Route  Inhalation Administered By  Neyda Sheriff, JOE          dextrose 5% and 0.45% " NaCl BOLUS     Admin Date  07/13/2021 Action  New Bag Dose  500 mL Rate  250 mL/hr Route  Intravenous Administered By  Neyda Sheriff RN           Admin Date  07/13/2021 Action  Restarted Dose   Rate  250 mL/hr Route  Intravenous Administered By  Neyda Sheriff RN          heparin 100 UNIT/ML injection 5 mL     Admin Date  07/13/2021 Action  Given Dose  5 mL Route  Intracatheter Administered By  Neyda Sheriff RN          morphine (PF) injection 2 mg     Admin Date  07/13/2021 Action  Given Dose  2 mg Route  Intravenous Administered By  Neyda Sheriff RN           Admin Date  07/13/2021 Action  Given Dose  2 mg Route  Intravenous Administered By  Neyda Sheriff RN           Admin Date  07/13/2021 Action  Given Dose  2 mg Route  Intravenous Administered By  Neyda Sheriff RN                    Discharge Plan:   Discharge instructions reviewed with: Patient.  Patient and/or family verbalized understanding of discharge instructions and all questions answered.  Patient discharged in stable condition accompanied by: self.  Departure Mode: [facility]ent [facility].      Neyda Sheriff RN                          Again, thank you for allowing me to participate in the care of your patient.        Sincerely,        [facility]

## 2021-07-13 NOTE — PHARMACY-VANCOMYCIN DOSING SERVICE
Pharmacy Vancomycin Initial Note  Date of Service 2021  Patient's  2000  21 year old, female    Indication: [facility] Pneumonia    Current estimated CrCl = Estimated Creatinine Clearance: 136.5 mL/min (based on SCr of 0.65 mg/dL).    Creatinine for last 3 days  2021:  3:38 AM Creatinine 0.58 mg/dL  2021: 10:24 AM Creatinine 0.60 mg/dL;  4:31 PM Creatinine 0.65 mg/dL    Recent Vancomycin Level(s) for last 3 days  No results found for requested labs within last 72 hours.      Vancomycin IV Administrations (past 72 hours)      No vancomycin orders with administrations in past 72 hours.                Nephrotoxins and other renal medications (From now, onward)    Start     Dose/Rate Route Frequency Ordered Stop    21 0700  vancomycin 1250 mg in 0.9% NaCl 250 mL intermittent infusion 1,250 mg      1,250 mg  over 90 Minutes Intravenous EVERY 12 HOURS 21 1813      21 1810  vancomycin (VANCOCIN) 2,000 mg in sodium chloride 0.9 % 500 mL intermittent infusion      2,000 mg  over 120 Minutes Intravenous ONCE 21 1806            Contrast Orders - past 72 hours (72h ago, onward)    None          Loading dose: 2000 mg at 19:00 2021.  Regimen: 1250 mg IV every 12 hours.  Start time: 18:12 on 2021  Exposure target: AUC24 (range)400-600 mg/L.hr   AUC24,ss: 463 mg/L.hr  Probability of AUC24 > 400: 65 %  Ctrough,ss: 13.1 mg/L  Probability of Ctrough,ss > 20: 21 %  Probability of nephrotoxicity (Lodise ANA LAURA ): 8 %          Plan:  1. Give vancomycin 2000mg IV x1 now, then start vancomycin 1250mg IV q12 hours  2. Vancomycin monitoring method: AUC  3. Vancomycin therapeutic monitoring goal: 400-600 mg*h/L  4. Pharmacy will check vancomycin levels as appropriate in 1-3 Days.    5. Serum creatinine levels will be ordered daily for the first week of therapy and at least twice weekly for subsequent weeks.      Aravind Alamo, PharmD, BCPS

## 2021-07-13 NOTE — H&P
[facility]    History and Physical - Hospitalist Service, Gold Night       Date of Admission:  7/13/2021    Assessment & Plan      Jennifer Cervantes is a 21 year old female admitted on 7/13/2021. She has a past medical history significant for sickle cell anemia, history of stroke with right upper extremity hemiparesis, venous thrombosis/PE on xarelto, anxiety, depression, and asthma who presented to the ED from infusion clinic after found to be hypoxic and complains of myalgias, shortness of breath, cough and rhinorrhea. She is admitted to Internal Medicine for further work-up and management.    Acute hypoxic respiratory failure  Hx of PE while on anticoagulation  Presenting with myalgias, SOB, cough and rhinorrhea. Afebrile. Noted to be hypoxic to 80s at infusion clinic. WBC 14.3. CXR with mild hazy bibasilar opacities favored to be atelectasis vs infection. Started on cefepime/vanco in ED. Exam with occasional coarse breath sounds. Not vaccinated for COVID but denies known exposures; also concern for RSV given high spread currently. Has history of acute chest syndrome requiring exchange transfusions as well as previous PE while on AC. Most likely etiology at this time appears viral vs bacterial infection. Unable to obtain CT PE due to contrast allergy; will get V/Q scan to r/o PE given history.  - Continue vanco/cefepime for HAP coverage  - Pending procalcitonin, trending WBC  - Pending BC x2  - Pending COVID/RSV  - Continue pta xarelto 15 mg BID  - V/Q scan     Sickle cell anemia with pain crisis  Complaining of worsening generalized pain as well as left flank pain in setting of above. Hgb 7.4. Tbili 6.3. Discussed with Hematology plan for overnight - will give 1u pRBCs this evening.   - Hematology consulted for AM  - Will consent for blood, type and screen and 1u pRBCs ordered  - Trending labs in AM  - Start morphine PCA per pain plan:   Continuous rate 1 mg/hr   PCA  dose 1 mg   PCA lockout q 20 minutes   1 hour limit 4 mg  - Holding pta oxycontin, prn oxycodone  - Capnography, pulse oximetry  - Continue pta hydea, jadenu    Hypokalemia  Potassium low in setting of poor appetite today while feeling unwell.  - Replace with potassium chloride 40 mEq     Asthma  PTA on symbicort, albuterol. Has used inhaler this week with no improvement in symptoms.  - Continue pta symbicort BID  - PRN albuterol HFA inhaler or nebulizer    Hx of PE, RUE DVT - Continue pta xarelto  Hx of CVA with residual RUE deficits - Remote history of CVA in 2015  Anxiety/Depression - Continue pta zoloft 200 mg daily, abilify 2 mg daily       Diet: Combination Diet Regular Diet Adult  DVT Prophylaxis: Xarelto  Lopez Catheter: Not present  Central Lines: None  Code Status: Full Code    Risk Factors Present on Admission             # Coagulation Defect: home medication list includes an anticoagulant medication       Disposition Plan   Expected discharge: 4-5 days recommended to prior living arrangement once adequate pain management/ tolerating PO medications, hemoglobin stable and O2 use less than 1 liters/minute.     The patient's care was discussed with the Attending Physician, Dr. Winters, Patient and Patient's Family.    HANNY Xiong [facility]  Securely message with the Vocera Web Console (learn more here)  Text page via [employer] Paging/Directory  Please see sign in/sign out for up to date coverage information    ______________________________________________________________________    Chief Complaint   Shortness of breath    History is obtained from the patient    History of Present Illness   Jennifer Cervantes is a 21 year old female who presented from infusion clinic with hypoxia. She notes having myalgias, shortness of breath and cough for a few days prior. Did also have a runny nose a few days prior as well as sore throat which has improved with lozenges.  Denies any fevers or chills. Had poor appetite today but has been able to eat okay. Has had several days of left flank pain and seen in ED multiple times with no imaging findings. Describes it as feeling like a knot that is worse with movement. Nothing has been helpful so far. Does report overall worsening pain since feeling unwell that is not well managed on her home regimen.    Has not been vaccinated for COVID but denies any known contacts. Has a history of requiring exchanged transfusion for acute chest with hypoxia but says this feels different. Also with history of previous PE while on anticoagulation but has been taking her medication as prescribed.    Review of Systems    The 10 point Review of Systems is negative other than noted in the HPI or here.     Past Medical History    I have reviewed this patient's medical history and updated it with pertinent information if needed.   Past Medical History:   Diagnosis Date     Anxiety      Bleeding disorder (H)      Blood clotting disorder (H)      Cerebral infarction (H) 2015     Cognitive developmental delay     low IQ. 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 (H)     right hand contractures     Iron overload due to repeated red blood cell transfusions      Migraines      Multiple subsegmental pulmonary emboli without acute cor pulmonale (H) 02/01/2021     Oppositional defiant behavior      Superficial venous thrombosis of arm, right 03/25/2021     Uncomplicated asthma        Past Surgical History   I have reviewed this patient's surgical history and updated it with pertinent information if needed.  Past Surgical History:   Procedure Laterality Date     AS INSERT TUNNELED CV 2 CATH W/O PORT/PUMP       C BREAST REDUCTION (INCLUDES LIPO) TIER 3 Bilateral 04/23/2019     CHOLECYSTECTOMY       INSERT PORT VASCULAR ACCESS Left 4/21/2021     Procedure: INSERTION, VASCULAR ACCESS PORT (NOT SURE ON SIDE UNTIL REMOVAL);  Surgeon: Rajan More MD;  Location: [facility] OR     IR CHEST PORT PLACEMENT > 5 YRS OF AGE  4/21/2021     IR CVC NON TUNNEL LINE REMOVAL  5/6/2021     IR CVC NON TUNNEL PLACEMENT  04/07/2020     IR CVC NON TUNNEL PLACEMENT  4/30/2021     IR PORT REMOVAL LEFT  4/21/2021     REMOVE PORT VASCULAR ACCESS Left 4/21/2021    Procedure: REMOVAL, VASCULAR ACCESS PORT LEFT;  Surgeon: Rajan More MD;  Location: [facility] OR     REPAIR TENDON ELBOW Right 10/02/2019    Procedure: Right Elbow Flexor Lengthening, Flexor Pronator Slide Of Wrist and Finger, Thumb Adductor Lengthening;  Surgeon: Anai Franco MD;  Location: [facility] OR     TONSILLECTOMY Bilateral 10/02/2019    Procedure: Bilateral Tonsillectomy;  Surgeon: Farhana Guy MD;  Location: [facility] OR       Social History   I have reviewed this patient's social history and updated it with pertinent information if needed.  Social History     Tobacco Use     Smoking status: Never Smoker     Smokeless tobacco: Never Used   Substance Use Topics     Alcohol use: Not Currently     Alcohol/week: 0.0 standard drinks     Drug use: Never       Family History   I have reviewed this patient's family history and updated it with pertinent information if needed.  Family History   Problem Relation Age of Onset     Sickle Cell Trait Mother      Hypertension Mother      Asthma Mother      Sickle Cell Trait Father        Prior to Admission Medications   Prior to Admission Medications   Prescriptions Last Dose Informant Patient Reported? Taking?   ARIPiprazole (ABILIFY) 2 MG tablet   No No   Sig: Take 1 tablet (2 mg) by mouth daily   EPINEPHrine (ANY BX GENERIC EQUIV) 0.3 MG/0.3ML injection 2-pack   No No   Sig: Inject 0.3 mLs (0.3 mg) into the muscle as needed for anaphylaxis   Patient not taking: Reported on 7/12/2021   Hydroxyurea 1000 MG TABS   No No   Sig: Take 2,000 mg by mouth daily   JADENU 360 MG  tablet   No No   Sig: Take 4 tablets (1,440 mg) by mouth every evening   acetaminophen (TYLENOL) 325 MG tablet   No No   Sig: Take 2 tablets (650 mg) by mouth every 6 hours as needed for mild pain   albuterol (PROAIR HFA/PROVENTIL HFA/VENTOLIN HFA) 108 (90 Base) MCG/ACT inhaler   No No   Sig: Inhale 2 puffs into the lungs every 6 hours as needed for shortness of breath / dyspnea or wheezing   albuterol (PROVENTIL) (2.5 MG/3ML) 0.083% neb solution   No No   Sig: Take 1 vial (2.5 mg) by nebulization every 6 hours as needed for shortness of breath / dyspnea or wheezing   budesonide-formoterol (SYMBICORT) 160-4.5 MCG/ACT Inhaler   Yes No   Sig: Inhale 1 puff into the lungs every evening   diclofenac (VOLTAREN) 1 % topical gel   No No   Sig: Apply 4 g topically 4 times daily as needed for moderate pain Apply to back, legs, and arms for pain   diphenhydrAMINE (BENADRYL) 25 MG capsule   No No   Sig: Take 1-2 capsules (25-50 mg) by mouth nightly as needed for sleep   hydrOXYzine (ATARAX) 25 MG tablet   No No   Sig: Take 1 tablet (25 mg) by mouth 3 times daily as needed for anxiety   lidocaine-prilocaine (EMLA) 2.5-2.5 % external cream   No No   Sig: Apply topically as needed for moderate pain   medroxyPROGESTERone (DEPO-PROVERA) 150 MG/ML IM injection   Yes No   Sig: Inject 150 mg into the muscle   naloxone (NARCAN) 4 MG/0.1ML nasal spray   No No   Sig: Spray 1 spray (4 mg) into one nostril alternating nostrils once as needed for opioid reversal every 2-3 minutes until assistance arrives   ondansetron (ZOFRAN) 8 MG tablet   Yes No   oxyCODONE (OXYCONTIN) 10 MG 12 hr tablet   No No   Sig: Take 1 tablet (10 mg) by mouth every 12 hours   oxyCODONE IR (ROXICODONE) 15 MG tablet   No No   Sig: Take 1 tablet (15mg) by mouth every 4-6 hours as needed for severe pain. Goal 4 per day. Max 6 per day.   rivaroxaban ANTICOAGULANT (XARELTO ANTICOAGULANT) 20 MG TABS tablet   No No   Sig: Take 1 tablet (20 mg) by mouth daily (with dinner)  Starting on 5/29 after completing your 15mg twice daily dosing on 5/28   sertraline (ZOLOFT) 100 MG tablet   No No   Sig: Take 2 tablets (200 mg) by mouth daily      Facility-Administered Medications Last Administration Doses Remaining   medroxyPROGESTERone (DEPO-PROVERA) syringe 150 mg 5/26/2021  2:00 PM 3        Allergies   Allergies   Allergen Reactions     Contrast Dye      Hives and breathing issues     Fish-Derived Products Hives     Seafood Hives     Diagnostic X-Ray Materials      Gadolinium        Physical Exam   Vital Signs: Temp: 98.4  F (36.9  C)   BP: 114/67 Pulse: 109   Resp: 24 SpO2: 98 % O2 Device: Nasal cannula Oxygen Delivery: 2 LPM  Weight: 0 lbs 0 oz    General Appearance: Awake. Alert and oriented x 3. No acute distress. Ill-appearing.  Eyes: Normal lids. + mild scleral icterus. PERRL. EOMS intact. No nystagmus.  HEENT: Normocephalic. Atraumatic. Nares patent. Oropharynx clear. Mucous membranes moist.  Respiratory: Normal work of breathing on 2L via NC. Lungs with coarse scattered crackles, occasional wheeze. Good air movement.  Cardiovascular: RRR. S1, S2. No murmurs, rubs or gallops. Pedal pulses 2+ No lower extremity edema.  GI: Abdomen non-distended. Normoactive bowel sounds. Soft, non-tender throughout abdomen. Right flank tender to palpation.  Lymph/Hematologic: No abnormal or excessive bruising.  Skin: Warm, dry. No rashes on exposed skin.   Musculoskeletal: Moves all extremities.  Neurologic: No focal deficits. CN II-XII grossly intact.    Data   Data reviewed today: I reviewed all medications, new labs and imaging results over the last 24 hours. I personally reviewed no images or EKG's today.    Recent Labs   Lab 07/13/21  1631 07/13/21  1630 07/13/21  1024 07/12/21  0338 07/10/21  0339   WBC  --  14.3* 15.0* 11.8* 15.8*   HGB  --  7.4* 7.3* 8.2* 7.8*   MCV  --  90 89 90 91   PLT  --  274 268 292 290     --  138 137 140   POTASSIUM 3.3*  --  3.2* 3.7 3.9   CHLORIDE 108  --  111*  108 112*   CO2 21  --  20 21 22   BUN 6*  --  6* 6* 9   CR 0.65  --  0.60 0.58 0.50*   ANIONGAP 7  --  7 8 6   MICAH 8.7  --  8.5 9.1 8.4*   GLC 85  --  106* 93 101*   ALBUMIN 4.3  --  4.1 4.1 4.0   PROTTOTAL 8.1  --  7.9 8.0 7.6   BILITOTAL 6.3*  --  7.2* 3.4* 3.6*   ALKPHOS 73  --  73 72 69   ALT 72*  --  65* 68* 70*   AST 74*  --  68* 71* 73*   LIPASE  --   --   --   --  145     Recent Results (from the past 24 hour(s))   XR Chest 2 Views    Narrative    EXAM: XR CHEST 2 VW  7/13/2021 12:49 PM     HISTORY:  sickle cell, new hypoxia, assess for acute chest; Sickle  cell pain crisis (H); Hypoxia       COMPARISON:  Chest x-ray 7/12/2021, 6/26/2021.    FINDINGS:   PA and lateral upright chest x-ray. Left chest wall Port-A-Cath tip  terminates over the cavoatrial junction.     Trachea is midline. Cardiac silhouette is within normal limits.    The vasculature is distinct. Hazy bibasilar opacities. No pleural  effusion or pneumothorax.    No acute osseous abnormality. Visualized soft tissues are  unremarkable. Nonobstructive bowel gas pattern in the visualized upper  abdomen with gaseous distended large bowel.      Impression    IMPRESSION:   Hazy bibasilar opacities, atelectasis versus infection.    I have personally reviewed the examination and initial interpretation  and I agree with the findings.    VIOLA ESCOBAR MD         SYSTEM ID:  [id number]   XR Chest Port 1 View    Narrative    XR CHEST PORT 1 VIEW  7/13/2021 4:43 PM      HISTORY: hypoxia. Sickle cell disease.    COMPARISON: 7/13/2021    FINDINGS:   Single AP view the chest. Left chest port tip overlying the superior  cavoatrial junction. Trachea is midline. Cardiac silhouette is similar  in size. No pneumothorax or significant pleural effusion. Mildly  decreased hazy bibasilar opacities.      Impression    IMPRESSION:   Mildly decreased hazy bibasilar opacities, favored atelectasis versus  less likely infection.    I have personally reviewed the examination and  initial interpretation  and I agree with the findings.    MANSI GAFFNEY MD         SYSTEM ID:  [id number]

## 2021-07-13 NOTE — ED NOTES
[facility]   ED Nurse to Floor Handoff     Jennifer Cervantes is a 21 year old female who speaks English and lives alone,  in a home  They arrived in the ED by car from home    ED Chief Complaint: Sickle Cell Pain Crisis    ED Dx;   Final diagnoses:   Acute respiratory failure with hypoxia (H)   Hb-SS disease with crisis (H)         Needed?: No    Allergies:   Allergies   Allergen Reactions     Contrast Dye      Hives and breathing issues     Fish-Derived Products Hives     Seafood Hives     Diagnostic X-Ray Materials      Gadolinium    .  Past Medical Hx:   Past Medical History:   Diagnosis Date     Anxiety      Bleeding disorder (H)      Blood clotting disorder (H)      Cerebral infarction (H) 2015     Cognitive developmental delay     low IQ. 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 (H)     right hand contractures     Iron overload due to repeated red blood cell transfusions      Migraines      Multiple subsegmental pulmonary emboli without acute cor pulmonale (H) 02/01/2021     Oppositional defiant behavior      Superficial venous thrombosis of arm, right 03/25/2021     Uncomplicated asthma       Baseline Mental status: WDL  Current Mental Status changes: at basesline    Infection present or suspected this encounter: no  Sepsis suspected: No  Isolation type: Special Precautions-COVID-19  Patient tested for COVID 19 prior to admission: YES     Activity level - Baseline/Home:  Independent  Activity Level - Current:   Total Care and Unknown    Bariatric equipment needed?: No    In the ED these meds were given:   Medications   lactated ringers infusion (has no administration in time range)   albuterol (PROAIR HFA/PROVENTIL HFA/VENTOLIN HFA) 108 (90 Base) MCG/ACT inhaler 6-8 puff (6 puffs Inhalation Given 7/13/21 9492)   morphine (PF)  injection 2 mg (2 mg Intravenous Given 7/13/21 4873)       Drips running?  No    Home pump  No    Current LDAs  Port A Cath Single 04/21/21 Left Chest wall (Active)   Number of days: 83       Incision/Surgical Site 04/21/21 Left Chest (Active)   Number of days: 83       Incision/Surgical Site 04/21/21 Left Neck (Active)   Number of days: 83       Labs results:   Labs Ordered and Resulted from Time of ED Arrival Up to the Time of Departure from the ED   COMPREHENSIVE METABOLIC PANEL - Abnormal; Notable for the following components:       Result Value    Potassium 3.3 (*)     Urea Nitrogen 6 (*)     AST 74 (*)     ALT 72 (*)     Bilirubin Total 6.3 (*)     All other components within normal limits   CBC WITH PLATELETS AND DIFFERENTIAL - Abnormal; Notable for the following components:    WBC Count 14.3 (*)     RBC Count 2.37 (*)     Hemoglobin 7.4 (*)     Hematocrit 21.2 (*)     RDW 22.2 (*)     NRBCs per 100 WBC 3 (*)     Absolute Neutrophils 8.6 (*)     Absolute Monocytes 1.4 (*)     Absolute Eosinophils 1.8 (*)     Absolute Immature Granulocytes 0.1 (*)     All other components within normal limits   LACTIC ACID WHOLE BLOOD - Normal   EXTRA BLUE TOP TUBE   EXTRA RED TOP TUBE   EXTRA PURPLE TOP TUBE   RETICULOCYTE COUNT   SARS-COV2 (COVID-19) VIRUS RT-PCR   PROCALCITONIN   PULSE OXIMETRY NURSING   COVID-19 VIRUS (CORONAVIRUS) BY PCR    Narrative:     The following orders were created for panel order Symptomatic COVID-19 Virus (Coronavirus) by PCR Nasopharyngeal.  Procedure                               Abnormality         Status                     ---------                               -----------         ------                     SARS-COV2 (COVID-19) Vir...[[id number]]                                                   Please view results for these tests on the individual orders.   CBC WITH PLATELETS & DIFFERENTIAL    Narrative:     The following orders were created for panel order CBC with platelets  differential.  Procedure                               Abnormality         Status                     ---------                               -----------         ------                     CBC with platelets and d...[[id number]]  Abnormal            Final result                 Please view results for these tests on the individual orders.   EXTRA TUBE    Narrative:     The following orders were created for panel order [place] Draw.  Procedure                               Abnormality         Status                     ---------                               -----------         ------                     Extra Blue Top Tube[[id number]]                              In process                 Extra Red Top Tube[[id number]]                               In process                 Extra Purple Top Tube[[id number]]                                                         Please view results for these tests on the individual orders.       Imaging Studies:   Recent Results (from the past 24 hour(s))   XR Chest 2 Views    Narrative    EXAM: XR CHEST 2 VW  7/13/2021 12:49 PM     HISTORY:  sickle cell, new hypoxia, assess for acute chest; Sickle  cell pain crisis (H); Hypoxia       COMPARISON:  Chest x-ray 7/12/2021, 6/26/2021.    FINDINGS:   PA and lateral upright chest x-ray. Left chest wall Port-A-Cath tip  terminates over the cavoatrial junction.     Trachea is midline. Cardiac silhouette is within normal limits.    The vasculature is distinct. Hazy bibasilar opacities. No pleural  effusion or pneumothorax.    No acute osseous abnormality. Visualized soft tissues are  unremarkable. Nonobstructive bowel gas pattern in the visualized upper  abdomen with gaseous distended large bowel.      Impression    IMPRESSION:   Hazy bibasilar opacities, atelectasis versus infection.    I have personally reviewed the examination and initial interpretation  and I agree with the findings.    VIOLA ESCOBAR MD         SYSTEM ID:  [id number]   XR  Chest Port 1 View    Narrative    XR CHEST PORT 1 VIEW  7/13/2021 4:43 PM      HISTORY: hypoxia. Sickle cell disease.    COMPARISON: 7/13/2021    FINDINGS:   Single AP view the chest. Left chest port tip overlying the superior  cavoatrial junction. Trachea is midline. Cardiac silhouette is similar  in size. No pneumothorax or significant pleural effusion. Mildly  decreased hazy bibasilar opacities.      Impression    IMPRESSION:   Mildly decreased hazy bibasilar opacities, favored atelectasis versus  less likely infection.    I have personally reviewed the examination and initial interpretation  and I agree with the findings.    MANSI GAFFNEY MD         SYSTEM ID:  [id number]       Recent vital signs:   /61   Pulse 109   Temp 98.4  F (36.9  C)   Resp 21   SpO2 99%     D[place] Coma Scale Score: 15 (07/13/21 1630)       Cardiac Rhythm: Tachycardia  Pt needs tele? Yes  Skin/wound Issues: None    Code Status: Full Code    Pain control: fair    Nausea control: pt had none    Abnormal labs/tests/findings requiring intervention: see epic     Family present during ED course? No   Family Comments/Social Situation comments: NA    Tasks needing completion: None     Rafaela Peña, RN  [phone] [facility]

## 2021-07-13 NOTE — PROGRESS NOTES
"Infusion Nursing Note:  Jennifer Cervantes presents today for IVF and pain medication.    Patient seen by provider today: Yes - see below.   present during visit today: Not Applicable.    Note: O2 SATS on arrival, RA = 86-88%. Patient reports her \"asthma has been flaring up the past couple of days, she denies SOB - does have a cough. RONALDO Forbes notified via page - Andrei at bedside to assess. Albuterol inhaler ordered, patient self administered 2 puffs. CXR ordered, patient walked independently down to imaging, declined portable O2. SATS upon arrival from imaging 80% on RA, and after albuterol continued to be 86-90%.   RONALDO Forbes had long conversation with patient about transporting from [facility] to ED via non-emergent ambulance, and the importance of needing O2 at this point. Patient adamant about going home first to  clothes, and declined. Fluids and morphine doses given. Patient left [facility] in stable condition, reports she will go get clothes and report to the ED.     ADDENDUM: Patient decided to take non-emergent transport. Called and arranged at 1445. Report given to JOE Arnold who resumed care at 1515.    Intravenous Access:  Implanted Port.    Treatment Conditions:  Not Applicable.      Post Infusion Assessment:  Patient tolerated infusion without incident.  Site patent and intact, free from redness, edema or discomfort.  No evidence of extravasations.  Access discontinued per protocol.     Administrations This Visit     albuterol (PROAIR HFA/PROVENTIL HFA/VENTOLIN HFA) 108 (90 Base) MCG/ACT inhaler 2 puff     Admin Date  07/13/2021 Action  Given Dose  2 puff Route  Inhalation Administered By  Neyda Sheriff RN          dextrose 5% and 0.45% NaCl BOLUS     Admin Date  07/13/2021 Action  New Bag Dose  500 mL Rate  250 mL/hr Route  Intravenous Administered By  Neyda Sheriff RN           Admin Date  07/13/2021 Action  Restarted Dose   Rate  250 mL/hr Route  Intravenous Administered By  Neyda Sheriff RN       "    heparin 100 UNIT/ML injection 5 mL     Admin Date  07/13/2021 Action  Given Dose  5 mL Route  Intracatheter Administered By  Neyda Sheriff RN          morphine (PF) injection 2 mg     Admin Date  07/13/2021 Action  Given Dose  2 mg Route  Intravenous Administered By  Neyda Sheriff RN           Admin Date  07/13/2021 Action  Given Dose  2 mg Route  Intravenous Administered By  Neyda Sheriff RN           Admin Date  07/13/2021 Action  Given Dose  2 mg Route  Intravenous Administered By  Neyda Sheriff RN                    Discharge Plan:   Discharge instructions reviewed with: Patient.  Patient and/or family verbalized understanding of discharge instructions and all questions answered.  Patient discharged in stable condition accompanied by: self.  Departure Mode: Non-emergent [facility].      Neyda Sheriff RN

## 2021-07-13 NOTE — TELEPHONE ENCOUNTER
Jennifer called to confirm IVF/Pain med scheduled for 10:00am today 7/13.     Needs transportation arranged.     Routed to social workers    9:01am Per Corry FRYE, ride has been arranged.

## 2021-07-13 NOTE — LETTER
7/13/2021         RE: Jennifer Cervantes  [address]  [facility] [address]        Dear Colleague,    Thank you for referring your patient, Jennifer Cervantes, to the [facility]. Please see a copy of my visit note below.    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      She was admitted 2/1/21-2/3/21 with a new PE, started on Rivaroxaban. Switched to Eliquis 3/25/21 with RUE DVT.     She was admitted 4/26/21-5/11/21 with sickle cell pain crisis complicated by worsening PE in setting of low Apixaban levels, acute hypoxic respiratory failure, pneumonia, acute chest syndrome s/p exchange transfusion on 4/30 and 5/4. After 2nd exchange her oxygen requirement dramatically improved from 20L to 1-2L 5/5 and she was off oxygen as of 5/6.      She has had numerous ED visits since that time. I was asked to see her today as an add on for hypoxia.     Interval History:  Jennifer was seen today in infusion as an add on for hypoxia. She is having diffuse sickle cell pain which is why she is in infusion today. She denies chest pain or SOB. Does feel like her asthma is worse so she has been using her inhaler, though when asked she doesn't feel like her chest is tight. She denies any fevers.     Current Outpatient Medications   Medication Sig Dispense Refill     acetaminophen (TYLENOL) 325 MG tablet Take 2 tablets (650 mg) by mouth every 6 hours as needed for mild pain 120 tablet 3     albuterol (PROAIR  HFA/PROVENTIL HFA/VENTOLIN HFA) 108 (90 Base) MCG/ACT inhaler Inhale 2 puffs into the lungs every 6 hours as needed for shortness of breath / dyspnea or wheezing 8.5 g 3     albuterol (PROVENTIL) (2.5 MG/3ML) 0.083% neb solution Take 1 vial (2.5 mg) by nebulization every 6 hours as needed for shortness of breath / dyspnea or wheezing 12 mL 4     ARIPiprazole (ABILIFY) 2 MG tablet Take 1 tablet (2 mg) by mouth daily 30 tablet 3     budesonide-formoterol (SYMBICORT) 160-4.5 MCG/ACT Inhaler Inhale 1 puff into the lungs every evening 10.2 g 3     diclofenac (VOLTAREN) 1 % topical gel Apply 4 g topically 4 times daily as needed for moderate pain Apply to back, legs, and arms for pain 150 g 3     diphenhydrAMINE (BENADRYL) 25 MG capsule Take 1-2 capsules (25-50 mg) by mouth nightly as needed for sleep 60 capsule 3     EPINEPHrine (ANY BX GENERIC EQUIV) 0.3 MG/0.3ML injection 2-pack Inject 0.3 mLs (0.3 mg) into the muscle as needed for anaphylaxis (Patient not taking: Reported on 7/12/2021) 1 each 1     Hydroxyurea 1000 MG TABS Take 2,000 mg by mouth daily 60 tablet 3     hydrOXYzine (ATARAX) 25 MG tablet Take 1 tablet (25 mg) by mouth 3 times daily as needed for anxiety 30 tablet 1     JADENU 360 MG tablet Take 4 tablets (1,440 mg) by mouth every evening 120 tablet 4     lidocaine-prilocaine (EMLA) 2.5-2.5 % external cream Apply topically as needed for moderate pain 30 g 1     medroxyPROGESTERone (DEPO-PROVERA) 150 MG/ML IM injection Inject 150 mg into the muscle       naloxone (NARCAN) 4 MG/0.1ML nasal spray Spray 1 spray (4 mg) into one nostril alternating nostrils once as needed for opioid reversal every 2-3 minutes until assistance arrives 0.2 mL 1     ondansetron (ZOFRAN) 8 MG tablet        oxyCODONE (OXYCONTIN) 10 MG 12 hr tablet Take 1 tablet (10 mg) by mouth every 12 hours 60 tablet 0     oxyCODONE IR (ROXICODONE) 15 MG tablet Take 1 tablet (15mg) by mouth every 4-6 hours as needed for severe pain. Goal 4 per day.  Max 6 per day. 60 tablet 0     rivaroxaban ANTICOAGULANT (XARELTO ANTICOAGULANT) 20 MG TABS tablet Take 1 tablet (20 mg) by mouth daily (with dinner) Starting on 5/29 after completing your 15mg twice daily dosing on 5/28 30 tablet 2     sertraline (ZOLOFT) 100 MG tablet Take 2 tablets (200 mg) by mouth daily 180 tablet 3     Physical Examination:  Vital Signs 7/13/2021 7/13/2021 7/13/2021 7/13/2021   Systolic 127      Diastolic 73      Pulse 132  116    Temperature 98.1      Respirations 16  18 22   Weight (LB)       Height       BMI (Calculated)       Pain       O2 88 92 96 90     Wt Readings from Last 10 Encounters:   07/12/21 77.1 kg (170 lb)   07/08/21 77.1 kg (170 lb)   07/02/21 77 kg (169 lb 12.8 oz)   06/30/21 77.1 kg (170 lb)   06/27/21 77.1 kg (170 lb)   06/26/21 77.1 kg (170 lb)   06/23/21 76.2 kg (168 lb)   06/20/21 76.2 kg (168 lb)   06/18/21 75.7 kg (166 lb 12.8 oz)   06/13/21 76.2 kg (168 lb)     Constitutional: Well-appearing female in no acute distress.  Cardiovascular: Tachycardic rate and regular rhythm. No murmurs, gallops, or rubs.  Respiratory: Rhonchi in bilateral lung bases. Non-labored breathing.   Neurologic: Cranial nerves II through XII are grossly intact.  Skin: No rashes, petechiae, or bruising noted on exposed skin.    Laboratory Data:  Results for HIMANSHU AL (MRN [id number]) as of 7/13/2021 11:42   7/13/2021 10:24   WBC 15.0 (H)   Hemoglobin 7.3 (L)   Hematocrit 21.2 (L)   Platelet Count 268   RBC Count 2.38 (L)   MCV 89   MCH 30.7   MCHC 34.4   RDW 21.4 (H)   % Neutrophils 69   % Lymphocytes 11   % Monocytes 9   % Eosinophils 9   % Basophils 1   % Immature Granulocytes 1   NRBCs per 100 WBC 3 (H)   Absolute Neutrophils 10.4 (H)   Absolute Lymphocytes 1.7   Absolute Monocytes 1.3   Absolute Eosinophils 1.4 (H)   Absolute Basophils 0.2   Absolute Immature Granulocytes 0.1 (H)   Absolute NRBCs 0.4   Glucose 106 (H)   Sodium 138   Potassium 3.2 (L)   Chloride 111 (H)   Carbon Dioxide  20   Urea Nitrogen 6 (L)   Creatinine 0.60   GFR Estimate >90   Calcium 8.5   Anion Gap 7   Albumin 4.1   Protein Total 7.9   Bilirubin Total 7.2 (H)   ALT 65 (H)   AST 68 (H)   Alkaline Phosphatase 73     CXR 7/13/21    PA and lateral upright chest x-ray. Left chest wall Port-A-Cath tip  terminates over the cavoatrial junction.      Trachea is midline. Cardiac silhouette is within normal limits.     The vasculature is distinct. Hazy bibasilar opacities. No pleural  effusion or pneumothorax.     No acute osseous abnormality. Visualized soft tissues are  unremarkable. Nonobstructive bowel gas pattern in the visualized upper  abdomen with gaseous distended large bowel.                                                                      IMPRESSION:   Hazy bibasilar opacities, atelectasis versus infection.     I have personally reviewed the examination and initial interpretation  and I agree with the findings.     VIOLA ESCOBAR MD      Assessment and Plan:  1. Hypoxia, recurrent  DDx includes worsening PE (has had worsening PE on anticoagulation in the past), acute chest, asthma exacerbation, pneumonia (less likely with no infectious symptoms). When O2 is turned off her sats initially were OK at 94% but slowly dropped to 88-91% during conversation. Did albuterol inhaler in clinic with no change in her O2 sats.    CXR with atelectasis vs infection. Concern for acute chest as well given these findings.     Needs to go to hospital for work-up of worsening PE and management of new worsening hypoxia. She will go to ED now.     35 minutes spent on the date of the encounter doing chart review, review of test results, patient visit and documentation     Andrei Machado PA-C  [facility]  [address] [place] [id number] [phone]        Again, thank you for allowing me to participate in the care of your patient.        Sincerely,        RONALDO Allan

## 2021-07-13 NOTE — PROGRESS NOTES
This is a recent snapshot of the patient's [facility] medical record.  For current drug dose and complete information and questions, call [phone] or In Basket pool, [facility] home infusion ([id number])  CSN Number:  [id number]

## 2021-07-13 NOTE — PROGRESS NOTES
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      She was admitted 2/1/21-2/3/21 with a new PE, started on Rivaroxaban. Switched to Eliquis 3/25/21 with RUE DVT.     She was admitted 4/26/21-5/11/21 with sickle cell pain crisis complicated by worsening PE in setting of low Apixaban levels, acute hypoxic respiratory failure, pneumonia, acute chest syndrome s/p exchange transfusion on 4/30 and 5/4. After 2nd exchange her oxygen requirement dramatically improved from 20L to 1-2L 5/5 and she was off oxygen as of 5/6.      She has had numerous ED visits since that time. I was asked to see her today as an add on for hypoxia.     Interval History:  Jennifer was seen today in infusion as an add on for hypoxia. She is having diffuse sickle cell pain which is why she is in infusion today. She denies chest pain or SOB. Does feel like her asthma is worse so she has been using her inhaler, though when asked she doesn't feel like her chest is tight. She denies any fevers.     Current Outpatient Medications   Medication Sig Dispense Refill     acetaminophen (TYLENOL) 325 MG tablet Take 2 tablets (650 mg) by mouth every 6 hours as needed for mild pain 120 tablet 3     albuterol (PROAIR HFA/PROVENTIL HFA/VENTOLIN HFA) 108 (90 Base) MCG/ACT inhaler Inhale 2 puffs into the lungs every 6 hours as needed for shortness of breath / dyspnea or wheezing 8.5 g 3     albuterol (PROVENTIL) (2.5 MG/3ML) 0.083% neb solution Take 1 vial (2.5 mg) by  nebulization every 6 hours as needed for shortness of breath / dyspnea or wheezing 12 mL 4     ARIPiprazole (ABILIFY) 2 MG tablet Take 1 tablet (2 mg) by mouth daily 30 tablet 3     budesonide-formoterol (SYMBICORT) 160-4.5 MCG/ACT Inhaler Inhale 1 puff into the lungs every evening 10.2 g 3     diclofenac (VOLTAREN) 1 % topical gel Apply 4 g topically 4 times daily as needed for moderate pain Apply to back, legs, and arms for pain 150 g 3     diphenhydrAMINE (BENADRYL) 25 MG capsule Take 1-2 capsules (25-50 mg) by mouth nightly as needed for sleep 60 capsule 3     EPINEPHrine (ANY BX GENERIC EQUIV) 0.3 MG/0.3ML injection 2-pack Inject 0.3 mLs (0.3 mg) into the muscle as needed for anaphylaxis (Patient not taking: Reported on 7/12/2021) 1 each 1     Hydroxyurea 1000 MG TABS Take 2,000 mg by mouth daily 60 tablet 3     hydrOXYzine (ATARAX) 25 MG tablet Take 1 tablet (25 mg) by mouth 3 times daily as needed for anxiety 30 tablet 1     JADENU 360 MG tablet Take 4 tablets (1,440 mg) by mouth every evening 120 tablet 4     lidocaine-prilocaine (EMLA) 2.5-2.5 % external cream Apply topically as needed for moderate pain 30 g 1     medroxyPROGESTERone (DEPO-PROVERA) 150 MG/ML IM injection Inject 150 mg into the muscle       naloxone (NARCAN) 4 MG/0.1ML nasal spray Spray 1 spray (4 mg) into one nostril alternating nostrils once as needed for opioid reversal every 2-3 minutes until assistance arrives 0.2 mL 1     ondansetron (ZOFRAN) 8 MG tablet        oxyCODONE (OXYCONTIN) 10 MG 12 hr tablet Take 1 tablet (10 mg) by mouth every 12 hours 60 tablet 0     oxyCODONE IR (ROXICODONE) 15 MG tablet Take 1 tablet (15mg) by mouth every 4-6 hours as needed for severe pain. Goal 4 per day. Max 6 per day. 60 tablet 0     rivaroxaban ANTICOAGULANT (XARELTO ANTICOAGULANT) 20 MG TABS tablet Take 1 tablet (20 mg) by mouth daily (with dinner) Starting on 5/29 after completing your 15mg twice daily dosing on 5/28 30 tablet 2     sertraline  (ZOLOFT) 100 MG tablet Take 2 tablets (200 mg) by mouth daily 180 tablet 3     Physical Examination:  Vital Signs 7/13/2021 7/13/2021 7/13/2021 7/13/2021   Systolic 127      Diastolic 73      Pulse 132  116    Temperature 98.1      Respirations 16  18 22   Weight (LB)       Height       BMI (Calculated)       Pain       O2 88 92 96 90     Wt Readings from Last 10 Encounters:   07/12/21 77.1 kg (170 lb)   07/08/21 77.1 kg (170 lb)   07/02/21 77 kg (169 lb 12.8 oz)   06/30/21 77.1 kg (170 lb)   06/27/21 77.1 kg (170 lb)   06/26/21 77.1 kg (170 lb)   06/23/21 76.2 kg (168 lb)   06/20/21 76.2 kg (168 lb)   06/18/21 75.7 kg (166 lb 12.8 oz)   06/13/21 76.2 kg (168 lb)     Constitutional: Well-appearing female in no acute distress.  Cardiovascular: Tachycardic rate and regular rhythm. No murmurs, gallops, or rubs.  Respiratory: Rhonchi in bilateral lung bases. Non-labored breathing.   Neurologic: Cranial nerves II through XII are grossly intact.  Skin: No rashes, petechiae, or bruising noted on exposed skin.    Laboratory Data:  Results for HIMANSHU AL (MRN [id number]) as of 7/13/2021 11:42   7/13/2021 10:24   WBC 15.0 (H)   Hemoglobin 7.3 (L)   Hematocrit 21.2 (L)   Platelet Count 268   RBC Count 2.38 (L)   MCV 89   MCH 30.7   MCHC 34.4   RDW 21.4 (H)   % Neutrophils 69   % Lymphocytes 11   % Monocytes 9   % Eosinophils 9   % Basophils 1   % Immature Granulocytes 1   NRBCs per 100 WBC 3 (H)   Absolute Neutrophils 10.4 (H)   Absolute Lymphocytes 1.7   Absolute Monocytes 1.3   Absolute Eosinophils 1.4 (H)   Absolute Basophils 0.2   Absolute Immature Granulocytes 0.1 (H)   Absolute NRBCs 0.4   Glucose 106 (H)   Sodium 138   Potassium 3.2 (L)   Chloride 111 (H)   Carbon Dioxide 20   Urea Nitrogen 6 (L)   Creatinine 0.60   GFR Estimate >90   Calcium 8.5   Anion Gap 7   Albumin 4.1   Protein Total 7.9   Bilirubin Total 7.2 (H)   ALT 65 (H)   AST 68 (H)   Alkaline Phosphatase 73     CXR 7/13/21    PA and lateral upright  chest x-ray. Left chest wall Port-A-Cath tip  terminates over the cavoatrial junction.      Trachea is midline. Cardiac silhouette is within normal limits.     The vasculature is distinct. Hazy bibasilar opacities. No pleural  effusion or pneumothorax.     No acute osseous abnormality. Visualized soft tissues are  unremarkable. Nonobstructive bowel gas pattern in the visualized upper  abdomen with gaseous distended large bowel.                                                                      IMPRESSION:   Hazy bibasilar opacities, atelectasis versus infection.     I have personally reviewed the examination and initial interpretation  and I agree with the findings.     VIOLA ESCOBAR MD      Assessment and Plan:  1. Hypoxia, recurrent  DDx includes worsening PE (has had worsening PE on anticoagulation in the past), acute chest, asthma exacerbation, pneumonia (less likely with no infectious symptoms). When O2 is turned off her sats initially were OK at 94% but slowly dropped to 88-91% during conversation. Did albuterol inhaler in clinic with no change in her O2 sats.    CXR with atelectasis vs infection. Concern for acute chest as well given these findings.     Needs to go to hospital for work-up of worsening PE and management of new worsening hypoxia. She will go to ED now.     35 minutes spent on the date of the encounter doing chart review, review of test results, patient visit and documentation     Andrei Machado PA-C  [facility]  [address] [place], [place] [id number] [phone]

## 2021-07-13 NOTE — ED PROVIDER NOTES
ED Provider Note  [facility]      History     Chief Complaint   Patient presents with     Sickle Cell Pain Crisis     HPI  Jennifer Cervantes is a 21 year old female with a PMH of sickle cell anemia,  cerebral infarction, venous thrombosis, PE, hemiplegia/weakness of right side and uncomplicated asthma who presents the ED today complaining of generalized myalgias, shortness of breath, cough and rhinorrhea.  Patient states her symptoms started 4 days ago with generalized myalgias, dry cough, rhinorrhea, and wheezing.  She states she thought it was her asthma.  She states she has been using her nebulizer at home which has helped somewhat.  She states she sometimes has shortness of breath with movement, but not at rest.  She also states she has had left flank pain with movement for the past 2 weeks, and states it is more comfortable to lie on her right side.  She states she did have a sore throat but have been sucking on candy so she does not have one currently.  Patient does not know her LMP as she is on Depo, stating her last Depo was 2 months ago.  She states she has been taking all of her medications as prescribed.  She endorses using Tylenol at home for pain.  She endorses eating and drinking normally.  She also states she has a port that is currently accessed.  Patient denies COVID-19 vaccination, COVID-19 exposure or chest pain.    She went to clinic today where she was found to be hypoxic.  She was placed on nasal cannula, when they attempted to wean her off of oxygen she would desaturate into the mid to high 80s.  Chest x-ray at clinic shows bibasilar atelectasis.  She has had breakthrough PEs even on anticoagulation in the past.  She was sent to the ED today for admission.  She is allergic to IV contrast.    Patient     Past Medical History  Past Medical History:   Diagnosis Date     Anxiety      Bleeding disorder (H)      Blood clotting disorder (H)      Cerebral infarction (H) 2015      Cognitive developmental delay     low IQ. 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 (H)     right hand contractures     Iron overload due to repeated red blood cell transfusions      Migraines      Multiple subsegmental pulmonary emboli without acute cor pulmonale (H) 02/01/2021     Oppositional defiant behavior      Superficial venous thrombosis of arm, right 03/25/2021     Uncomplicated asthma      Past Surgical History:   Procedure Laterality Date     AS INSERT TUNNELED CV 2 CATH W/O PORT/PUMP       C BREAST REDUCTION (INCLUDES LIPO) TIER 3 Bilateral 04/23/2019     CHOLECYSTECTOMY       INSERT PORT VASCULAR ACCESS Left 4/21/2021    Procedure: INSERTION, VASCULAR ACCESS PORT (NOT SURE ON SIDE UNTIL REMOVAL);  Surgeon: Rajan More MD;  Location: [facility] OR     IR CHEST PORT PLACEMENT > 5 YRS OF AGE  4/21/2021     IR CVC NON TUNNEL LINE REMOVAL  5/6/2021     IR CVC NON TUNNEL PLACEMENT  04/07/2020     IR CVC NON TUNNEL PLACEMENT  4/30/2021     IR PORT REMOVAL LEFT  4/21/2021     REMOVE PORT VASCULAR ACCESS Left 4/21/2021    Procedure: REMOVAL, VASCULAR ACCESS PORT LEFT;  Surgeon: Rajan More MD;  Location: [facility] OR     REPAIR TENDON ELBOW Right 10/02/2019    Procedure: Right Elbow Flexor Lengthening, Flexor Pronator Slide Of Wrist and Finger, Thumb Adductor Lengthening;  Surgeon: Anai Franco MD;  Location: [facility] OR     TONSILLECTOMY Bilateral 10/02/2019    Procedure: Bilateral Tonsillectomy;  Surgeon: Farhana Guy MD;  Location: [facility] OR     acetaminophen (TYLENOL) 325 MG tablet  albuterol (PROAIR HFA/PROVENTIL HFA/VENTOLIN HFA) 108 (90 Base) MCG/ACT inhaler  albuterol (PROVENTIL) (2.5 MG/3ML) 0.083% neb solution  ARIPiprazole (ABILIFY) 2 MG tablet  budesonide-formoterol (SYMBICORT) 160-4.5 MCG/ACT Inhaler  diclofenac (VOLTAREN) 1 % topical gel  diphenhydrAMINE  (BENADRYL) 25 MG capsule  EPINEPHrine (ANY BX GENERIC EQUIV) 0.3 MG/0.3ML injection 2-pack  Hydroxyurea 1000 MG TABS  hydrOXYzine (ATARAX) 25 MG tablet  JADENU 360 MG tablet  lidocaine-prilocaine (EMLA) 2.5-2.5 % external cream  medroxyPROGESTERone (DEPO-PROVERA) 150 MG/ML IM injection  naloxone (NARCAN) 4 MG/0.1ML nasal spray  ondansetron (ZOFRAN) 8 MG tablet  oxyCODONE (OXYCONTIN) 10 MG 12 hr tablet  oxyCODONE IR (ROXICODONE) 15 MG tablet  rivaroxaban ANTICOAGULANT (XARELTO ANTICOAGULANT) 20 MG TABS tablet  sertraline (ZOLOFT) 100 MG tablet      Allergies   Allergen Reactions     Contrast Dye      Hives and breathing issues     Fish-Derived Products Hives     Seafood Hives     Diagnostic X-Ray Materials      Gadolinium      Family History  Family History   Problem Relation Age of Onset     Sickle Cell Trait Mother      Hypertension Mother      Asthma Mother      Sickle Cell Trait Father      Social History   Social History     Tobacco Use     Smoking status: Never Smoker     Smokeless tobacco: Never Used   Substance Use Topics     Alcohol use: Not Currently     Alcohol/week: 0.0 standard drinks     Drug use: Never      Past medical history, past surgical history, medications, allergies, family history, and social history were reviewed with the patient. No additional pertinent items.       Review of Systems   Constitutional: Positive for fatigue. Negative for chills and fever.   HENT: Positive for rhinorrhea and sore throat. Negative for congestion.    Eyes: Negative for redness.   Respiratory: Positive for cough (dry), shortness of breath and wheezing.    Cardiovascular: Negative for chest pain.   Gastrointestinal: Negative for abdominal pain, diarrhea, nausea and vomiting.   Genitourinary: Positive for flank pain (left). Negative for difficulty urinating and pelvic pain.   Musculoskeletal: Positive for myalgias (generalized). Negative for arthralgias and neck stiffness.   Skin: Negative for color change.    Neurological: Negative for headaches.   Psychiatric/Behavioral: Negative for confusion.   All other systems reviewed and are negative.    A complete review of systems was performed with pertinent positives and negatives noted in the HPI, and all other systems negative.    Physical Exam   BP: (!) 161/69  Pulse: 120  Temp: 98.4  F (36.9  C)  Resp: 16  SpO2: 94 %  Physical Exam  Vitals and nursing note reviewed.   Constitutional:       General: She is not in acute distress.     Appearance: She is well-developed. She is ill-appearing. She is not toxic-appearing or diaphoretic.      Comments: Adult female, lying on side, speaking very softly, appears ill but not toxic   HENT:      Head: Normocephalic and atraumatic.      Mouth/Throat:      Mouth: Mucous membranes are moist.      Pharynx: Oropharynx is clear. No oropharyngeal exudate.   Eyes:      Conjunctiva/sclera: Conjunctivae normal.      Pupils: Pupils are equal, round, and reactive to light.   Cardiovascular:      Rate and Rhythm: Regular rhythm. Tachycardia present.      Pulses: Normal pulses.      Heart sounds: Normal heart sounds.   Pulmonary:      Effort: No respiratory distress.      Breath sounds: Wheezing present. No rales.      Comments: Coarse breath sounds B, wheezing noted in both lung fields. Mild accessory muscle use.   Abdominal:      General: There is no distension.      Palpations: Abdomen is soft.      Tenderness: There is abdominal tenderness.      Comments: Mild L lateral abdominal TTP without guarding or rebound. Hypoactive bowel sounds   Musculoskeletal:      Cervical back: Normal range of motion and neck supple.   Skin:     General: Skin is warm and dry.   Neurological:      Mental Status: She is alert and oriented to person, place, and time.      Comments: R sided weakness (at baseline)         ED Course     5:01 PM  The patient was seen and examined by Leslie Roper MD in Room ED21.     Procedures            EKG Interpretation:       Interpreted by Leslie Roper MD  Time reviewed: 1640  Symptoms at time of EKG: None   Rhythm: sinus tachycardia  Rate: 110-120  Axis: Normal  Ectopy: none  Conduction: normal  ST Segments/ T Waves: No ST-T wave changes and No acute ischemic changes  Q Waves: none  Comparison to prior: Unchanged    Clinical Impression: sinus tachycardia              The medical record was reviewed and interpreted.  Current labs reviewed and interpreted.  Previous labs reviewed and interpreted.  Current images reviewed and interpreted: bibasilar haziness, infiltrates vs atelectasis.       Results for orders placed or performed during the hospital encounter of 07/13/21   XR Chest Port 1 View     Status: None    Narrative    XR CHEST PORT 1 VIEW  7/13/2021 4:43 PM      HISTORY: hypoxia. Sickle cell disease.    COMPARISON: 7/13/2021    FINDINGS:   Single AP view the chest. Left chest port tip overlying the superior  cavoatrial junction. Trachea is midline. Cardiac silhouette is similar  in size. No pneumothorax or significant pleural effusion. Mildly  decreased hazy bibasilar opacities.      Impression    IMPRESSION:   Mildly decreased hazy bibasilar opacities, favored atelectasis versus  less likely infection.    I have personally reviewed the examination and initial interpretation  and I agree with the findings.    MANSI GAFFNEY MD         SYSTEM ID:  [id number]   NM Lung Scan Perfusion Particulate     Status: None (Preliminary result)    Impression    RESIDENT PRELIMINARY INTERPRETATION  Impression:   1. At least one new segmental perfusion defect in the left upper  quadrant in a background of known subsegmental and segmental perfusion  defects, suggestive of acute on chronic pulmonary emboli.  2. Clear lungs on SPECT-CT.    [Urgent Result: Probable acute on chronic pulmonary emboli.]    Finding was identified on 7/13/2021 10:36 PM.     Dr. Roper was contacted by Dr. Cobb at 7/13/2021 10:45 PM and  verbalized understanding  of the urgent finding.     Comprehensive metabolic panel     Status: Abnormal   Result Value Ref Range    Sodium 136 133 - 144 mmol/L    Potassium 3.3 (L) 3.4 - 5.3 mmol/L    Chloride 108 94 - 109 mmol/L    Carbon Dioxide (CO2) 21 20 - 32 mmol/L    Anion Gap 7 3 - 14 mmol/L    Urea Nitrogen 6 (L) 7 - 30 mg/dL    Creatinine 0.65 0.52 - 1.04 mg/dL    Calcium 8.7 8.5 - 10.1 mg/dL    Glucose 85 70 - 99 mg/dL    Alkaline Phosphatase 73 40 - 150 U/L    AST 74 (H) 0 - 45 U/L    ALT 72 (H) 0 - 50 U/L    Protein Total 8.1 6.8 - 8.8 g/dL    Albumin 4.3 3.4 - 5.0 g/dL    Bilirubin Total 6.3 (H) 0.2 - 1.3 mg/dL    GFR Estimate >90 >60 mL/min/1.73m2   Lactic acid whole blood     Status: Normal   Result Value Ref Range    Lactic Acid 0.8 0.7 - 2.0 mmol/L   CBC with platelets and differential     Status: Abnormal   Result Value Ref Range    WBC Count 14.3 (H) 4.0 - 11.0 10e3/uL    RBC Count 2.37 (L) 3.80 - 5.20 10e6/uL    Hemoglobin 7.4 (L) 11.7 - 15.7 g/dL    Hematocrit 21.2 (L) 35.0 - 47.0 %    MCV 90 78 - 100 fL    MCH 31.2 26.5 - 33.0 pg    MCHC 34.9 31.5 - 36.5 g/dL    RDW 22.2 (H) 10.0 - 15.0 %    Platelet Count 274 150 - 450 10e3/uL    % Neutrophils 60 %    % Lymphocytes 15 %    % Monocytes 10 %    % Eosinophils 12 %    % Basophils 2 %    % Immature Granulocytes 1 %    NRBCs per 100 WBC 3 (H) <1 /100    Absolute Neutrophils 8.6 (H) 1.6 - 8.3 10e3/uL    Absolute Lymphocytes 2.2 0.8 - 5.3 10e3/uL    Absolute Monocytes 1.4 (H) 0.0 - 1.3 10e3/uL    Absolute Eosinophils 1.8 (H) 0.0 - 0.7 10e3/uL    Absolute Basophils 0.2 0.0 - 0.2 10e3/uL    Absolute Immature Granulocytes 0.1 (H) <=0.0 10e3/uL    Absolute NRBCs 0.4 10e3/uL   Extra Blue Top Tube     Status: None   Result Value Ref Range    Hold Specimen JIC    Extra Red Top Tube     Status: None   Result Value Ref Range    Hold Specimen JIC    Extra Purple Top Tube     Status: None   Result Value Ref Range    Hold Specimen JIC    Reticulocyte count     Status: Abnormal   Result  Value Ref Range    % Reticulocyte 28.1 (H) 0.5 - 2.0 %    Absolute Reticulocyte 0.665 (H) 0.025 - 0.095 10e6/uL   SARS-COV2 (COVID-19) Virus RT-PCR     Status: Normal    Specimen: Nasopharyngeal; Swab   Result Value Ref Range    SARS CoV2 PCR Negative Negative    Narrative    Testing was performed using the Xpert Xpress SARS-CoV-2 Assay on the  Cepheid Gene-Xpert Instrument Systems. Additional information about  this Emergency Use Authorization (EUA) assay can be found via the Lab  Guide. This test should be ordered for the detection of SARS-CoV-2 in  individuals who meet SARS-CoV-2 clinical and/or epidemiological  criteria. Test performance is unknown in asymptomatic patients. This  test is for in vitro diagnostic use under the FDA EUA for  laboratories certified under CLIA to perform high complexity testing.  This test has not been FDA cleared or approved. A negative result  does not rule out the presence of PCR inhibitors in the specimen or  target RNA in concentration below the limit of detection for the  assay. The possibility of a false negative should be considered if  the patient's recent exposure or clinical presentation suggests  COVID-19. This test was validated by the [facility]. This laboratory is certified under  the Clinical Laboratory Improvement Amendments of 1988 (CLIA-88) as  qualified to perform high complexity laboratory testing.     Procalcitonin     Status: Normal   Result Value Ref Range    Procalcitonin 0.30 <5.00 ng/mL   Symptomatic COVID-19 Virus (Coronavirus) by PCR Nasopharyngeal     Status: Normal    Specimen: Nasopharyngeal; Swab    Narrative    The following orders were created for panel order Symptomatic COVID-19 Virus (Coronavirus) by PCR Nasopharyngeal.  Procedure                               Abnormality         Status                     ---------                               -----------         ------                      SARS-COV2 (COVID-19) Vir...[[id number]]  Normal              Final result                 Please view results for these tests on the individual orders.   CBC with platelets differential     Status: Abnormal    Narrative    The following orders were created for panel order CBC with platelets differential.  Procedure                               Abnormality         Status                     ---------                               -----------         ------                     CBC with platelets and d...[[id number]]  Abnormal            Final result                 Please view results for these tests on the individual orders.   [place] Draw     Status: None    Narrative    The following orders were created for panel order [place] Draw.  Procedure                               Abnormality         Status                     ---------                               -----------         ------                     Extra Blue Top Tube[[id number]]                              Final result               Extra Red Top Tube[[id number]]                               Final result               Extra Purple Top Tube[[id number]]                            Final result                 Please view results for these tests on the individual orders.   ABO/Rh type and screen *Canceled*     Status: None ()    Narrative    The following orders were created for panel order ABO/Rh type and screen.  Procedure                               Abnormality         Status                     ---------                               -----------         ------                       Please view results for these tests on the individual orders.     Medications   lactated ringers infusion ( Intravenous Rate/Dose Verify 7/13/21 2300)   vancomycin 1250 mg in 0.9% NaCl 250 mL intermittent infusion 1,250 mg (has no administration in time range)   lidocaine 1 % 0.1-1 mL (has no administration in time range)   lidocaine (LMX4) cream (has no administration in time  range)   sodium chloride (PF) 0.9% PF flush 3 mL (3 mLs Intracatheter Given 7/13/21 2022)   sodium chloride (PF) 0.9% PF flush 3 mL (has no administration in time range)   Patient is already receiving anticoagulation with heparin, enoxaparin (LOVENOX), warfarin (COUMADIN)  or other anticoagulant medication (has no administration in time range)   albuterol (PROAIR HFA/PROVENTIL HFA/VENTOLIN HFA) 108 (90 Base) MCG/ACT inhaler 2 puff (has no administration in time range)   albuterol (PROVENTIL) neb solution 2.5 mg (has no administration in time range)   acetaminophen (TYLENOL) tablet 650 mg (has no administration in time range)   ARIPiprazole (ABILIFY) tablet 2 mg (2 mg Oral Not Given 7/13/21 2140)   fluticasone-vilanterol (BREO ELLIPTA) 200-25 MCG/INH inhaler 1 puff (has no administration in time range)   diclofenac (VOLTAREN) 1 % topical gel 4 g (has no administration in time range)   diphenhydrAMINE (BENADRYL) capsule 25-50 mg (has no administration in time range)   hydroxyurea (HYDREA) capsule 2,000 mg (has no administration in time range)   hydrOXYzine (ATARAX) tablet 25 mg (has no administration in time range)   deferasirox (JADENU) tablet 1,440 mg (has no administration in time range)   oxyCODONE (oxyCONTIN) 12 hr tablet 10 mg ( Oral Automatically Held 7/19/21 2200)   rivaroxaban ANTICOAGULANT (XARELTO) tablet 15 mg ( Oral Automatically Held 7/17/21 1800)   sertraline (ZOLOFT) tablet 200 mg (200 mg Oral Not Given 7/13/21 2140)   senna-docusate (SENOKOT-S/PERICOLACE) 8.6-50 MG per tablet 1 tablet (1 tablet Oral Not Given 7/13/21 2034)     Or   senna-docusate (SENOKOT-S/PERICOLACE) 8.6-50 MG per tablet 2 tablet ( Oral See Alternative 7/13/21 2034)   polyethylene glycol (MIRALAX) Packet 17 g (17 g Oral Not Given 7/13/21 2021)   morphine  PCA 1 mg/mL OPIOID TOLERANT ( Intravenous Rate/Dose Verify 7/13/21 2300)   ondansetron (ZOFRAN-ODT) ODT tab 4 mg ( Oral See Alternative 7/13/21 2133)     Or   ondansetron (ZOFRAN)  injection 4 mg (4 mg Intravenous Given 7/13/21 2133)   prochlorperazine (COMPAZINE) injection 10 mg (has no administration in time range)     Or   prochlorperazine (COMPAZINE) tablet 10 mg (has no administration in time range)     Or   prochlorperazine (COMPAZINE) suppository 25 mg (has no administration in time range)   ceFEPIme (MAXIPIME) 2 g vial to attach to  ml bag for ADULTS or 50 ml bag for PEDS (has no administration in time range)   oxyCODONE (ROXICODONE) tablet 15 mg ( Oral Held by provider 7/13/21 2014)   0.9% sodium chloride BOLUS (has no administration in time range)   heparin 25,000 units in 0.45% NaCl 250 mL ANTICOAGULANT infusion (has no administration in time range)   albuterol (PROAIR HFA/PROVENTIL HFA/VENTOLIN HFA) 108 (90 Base) MCG/ACT inhaler 6-8 puff (6 puffs Inhalation Given 7/13/21 1742)   morphine (PF) injection 2 mg (2 mg Intravenous Given 7/13/21 1741)   ceFEPIme (MAXIPIME) 2 g vial to attach to  ml bag for ADULTS or 50 ml bag for PEDS (0 g Intravenous Stopped 7/13/21 2021)   vancomycin (VANCOCIN) 2,000 mg in sodium chloride 0.9 % 500 mL intermittent infusion ( Intravenous Restarted 7/13/21 2250)   potassium chloride ER (KLOR-CON M) CR tablet 40 mEq (40 mEq Oral Given 7/13/21 2109)   technetium pertechnetate with albumin (Tc99m MAA) radioisotope injection 4.8-7.2 mCi (7 mCi Intravenous Given 7/13/21 2217)        Assessments & Plan (with Medical Decision Making)   Patient presents to the emergency department today complaining of overall body pain and aches, cough, and sent from clinic due to hypoxia.  On exam she is mildly tachycardic, she is requiring 2 L via nasal cannula.  Differential diagnosis certainly could include infection of any type, bacterial or viral.  She is not Covid vaccinated and Covid is certainly a possibility.  Additionally, she could have a breakthrough PE which she has had before even on anticoagulation.  Acute chest syndrome is a possibility, but she  previously required exchange transfusion through the hematology service due to acute chest.  We have seen her in the ED on many occasions, and she certainly looks more ill appearing today than she typically does.    We did establish IV access and we did draw blood for laboratory analysis.  White count today is 15, there is a left shift.  Hemoglobin is 7.3 and platelets are 268.  Comprehensive metabolic panel shows minimally increased transaminases, bilirubin is 7.2 which is potentially consistent with an acute sickling event.  We have ordered a lactate and a procalcitonin as well as blood cultures which are currently pending.  I spoke to the radiology resident about whether doing a V/Q scan would be helpful given her bibasilar atelectasis on chest x-ray.  PE certainly needs to be considered in this patient, and she is allergic to IV contrast so CT cannot be done.  Radiology resident does believe that it may be reasonable to do a perfusion portion of the scan (ventilation portion of the scan has not been done on anyone this year due to Covid) and I have ordered this.  Given hypoxia, bibasilar atelectasis, and elevated white count, we will also treat for potential bacterial pneumonia.  Even if she does have Covid this could represent a superimposed bacterial pneumonia.  We have ordered cefepime and vancomycin given her recent hospital contacts.  Covid swab has been ordered and is currently pending.    Of note, I did involve hematology very early in the patient's ED course.  As this patient has required exchange transfusion in the past for acute chest syndrome, I did notify the hematology fellow of this patient so they can follow along closely and determine if she would meet the threshold for exchange transfusion.    I did receive a phone call from the radiology resident who reported that she seems to have an acute on chronic perfusion defect in the left upper lobe.  She has had breakthrough PEs on xarelto previously  "as well.  By this time the primary medicine team was caring for the patient so I called the [facility] PA and communicated this to her.  PA reports that she is going to speak to the physician on the [facility] team and make determination about need for heparin.    Of note, the patient has occasionally during this visit been threatening toward nursing.  This is typically her pattern, when she becomes frustrated she will lash out at nursing staff with verbal abuse.  She did make veiled threats toward the nursing staff, telling her mother \"to come deal with this nurse\" and swearing repeatedly.  This is not uncommon for her in the ED, usually toward the nursing staff.  Would recommend that (eventually, after this acute episode of illness is resolved) the patient's primary hematology clinic write into her care plan that verbal abuse of the staff will not be tolerated.  Will message PA from hematology clinic.     I have reviewed the nursing notes. I have reviewed the findings, diagnosis, plan and need for follow up with the patient.    New Prescriptions    No medications on file       Final diagnoses:   Acute respiratory failure with hypoxia (H)   Hb-SS disease with crisis (H)   Pulmonary embolism without acute cor pulmonale, unspecified chronicity, unspecified pulmonary embolism type (H) - acute on chronic despite treatment with Xarelto   I, Gutierrez Gibson, am serving as a trained medical scribe to document services personally performed by Leslie Roper MD, based on the provider's statements to me.     Leslie NEWMAN MD, was physically present and have reviewed and verified the accuracy of this note documented by Gutierrez Gibson.      --  Leslie Roper MD  [facility] EMERGENCY DEPARTMENT  7/13/2021     Leslie Roper MD  07/14/21 0011    "

## 2021-07-14 ENCOUNTER — APPOINTMENT (OUTPATIENT)
Dept: GENERAL RADIOLOGY | Facility: CLINIC | Age: 21
DRG: 871 | End: 2021-07-14
Attending: HOSPITALIST
Payer: COMMERCIAL

## 2021-07-14 LAB
ALBUMIN SERPL-MCNC: 3.7 G/DL (ref 3.4–5)
ALBUMIN UR-MCNC: NEGATIVE MG/DL
ALP SERPL-CCNC: 68 U/L (ref 40–150)
ALT SERPL W P-5'-P-CCNC: 78 U/L (ref 0–50)
ANION GAP SERPL CALCULATED.3IONS-SCNC: 7 MMOL/L (ref 3–14)
APPEARANCE UR: CLEAR
APTT PPP: 104 SECONDS (ref 22–38)
APTT PPP: 141 SECONDS (ref 22–38)
APTT PPP: 94 SECONDS (ref 22–38)
AST SERPL W P-5'-P-CCNC: 81 U/L (ref 0–45)
ATRIAL RATE - MUSE: 115 BPM
BASOPHILS # BLD MANUAL: 0.1 10E3/UL (ref 0–0.2)
BASOPHILS NFR BLD MANUAL: 1 %
BILIRUB SERPL-MCNC: 4.8 MG/DL (ref 0.2–1.3)
BILIRUB UR QL STRIP: NEGATIVE
BUN SERPL-MCNC: 5 MG/DL (ref 7–30)
BURR CELLS BLD QL SMEAR: SLIGHT
CALCIUM SERPL-MCNC: 8.4 MG/DL (ref 8.5–10.1)
CHLORIDE BLD-SCNC: 111 MMOL/L (ref 94–109)
CO2 SERPL-SCNC: 22 MMOL/L (ref 20–32)
COLOR UR AUTO: COLORLESS
CREAT SERPL-MCNC: 0.64 MG/DL (ref 0.52–1.04)
DIASTOLIC BLOOD PRESSURE - MUSE: NORMAL MMHG
EOSINOPHIL # BLD MANUAL: 2.4 10E3/UL (ref 0–0.7)
EOSINOPHIL NFR BLD MANUAL: 17 %
ERYTHROCYTE [DISTWIDTH] IN BLOOD BY AUTOMATED COUNT: 20.6 % (ref 10–15)
FLUAV RNA SPEC QL NAA+PROBE: NEGATIVE
FLUBV RNA RESP QL NAA+PROBE: NEGATIVE
GFR SERPL CREATININE-BSD FRML MDRD: >90 ML/MIN/1.73M2
GLUCOSE BLD-MCNC: 76 MG/DL (ref 70–99)
GLUCOSE UR STRIP-MCNC: NEGATIVE MG/DL
HCT VFR BLD AUTO: 21.9 % (ref 35–47)
HGB BLD-MCNC: 7.6 G/DL (ref 11.7–15.7)
HGB UR QL STRIP: NEGATIVE
INTERPRETATION ECG - MUSE: NORMAL
KETONES UR STRIP-MCNC: NEGATIVE MG/DL
LACTATE SERPL-SCNC: 0.8 MMOL/L (ref 0.7–2)
LEUKOCYTE ESTERASE UR QL STRIP: NEGATIVE
LYMPHOCYTES # BLD MANUAL: 2.9 10E3/UL (ref 0.8–5.3)
LYMPHOCYTES NFR BLD MANUAL: 21 %
MCH RBC QN AUTO: 30.9 PG (ref 26.5–33)
MCHC RBC AUTO-ENTMCNC: 34.7 G/DL (ref 31.5–36.5)
MCV RBC AUTO: 89 FL (ref 78–100)
MONOCYTES # BLD MANUAL: 0.7 10E3/UL (ref 0–1.3)
MONOCYTES NFR BLD MANUAL: 5 %
NEUTROPHILS # BLD MANUAL: 7.8 10E3/UL (ref 1.6–8.3)
NEUTROPHILS NFR BLD MANUAL: 56 %
NITRATE UR QL: POSITIVE
NRBC # BLD AUTO: 2.5 10E3/UL
NRBC BLD MANUAL-RTO: 18 %
P AXIS - MUSE: 67 DEGREES
PH UR STRIP: 6 [PH] (ref 5–7)
PLAT MORPH BLD: ABNORMAL
PLATELET # BLD AUTO: 253 10E3/UL (ref 150–450)
POTASSIUM BLD-SCNC: 3.8 MMOL/L (ref 3.4–5.3)
PR INTERVAL - MUSE: 138 MS
PROT SERPL-MCNC: 7.3 G/DL (ref 6.8–8.8)
QRS DURATION - MUSE: 72 MS
QT - MUSE: 340 MS
QTC - MUSE: 470 MS
R AXIS - MUSE: 28 DEGREES
RADIOLOGIST FLAGS: ABNORMAL
RBC # BLD AUTO: 2.46 10E6/UL (ref 3.8–5.2)
RBC MORPH BLD: ABNORMAL
RBC URINE: <1 /HPF
RETICS # AUTO: 0.56 10E6/UL (ref 0.03–0.1)
RETICS/RBC NFR AUTO: 22.5 % (ref 0.5–2)
RSV RNA SPEC NAA+PROBE: NEGATIVE
SICKLE CELLS BLD QL SMEAR: ABNORMAL
SODIUM SERPL-SCNC: 140 MMOL/L (ref 133–144)
SP GR UR STRIP: 1 (ref 1–1.03)
SYSTOLIC BLOOD PRESSURE - MUSE: NORMAL MMHG
T AXIS - MUSE: 26 DEGREES
UROBILINOGEN UR STRIP-MCNC: NORMAL MG/DL
VENTRICULAR RATE- MUSE: 115 BPM
WBC # BLD AUTO: 14 10E3/UL (ref 4–11)
WBC URINE: 0 /HPF

## 2021-07-14 PROCEDURE — 258N000003 HC RX IP 258 OP 636: Performed by: EMERGENCY MEDICINE

## 2021-07-14 PROCEDURE — 120N000002 HC R&B MED SURG/OB UMMC

## 2021-07-14 PROCEDURE — 250N000013 HC RX MED GY IP 250 OP 250 PS 637: Performed by: PHYSICIAN ASSISTANT

## 2021-07-14 PROCEDURE — 71045 X-RAY EXAM CHEST 1 VIEW: CPT | Mod: 26 | Performed by: RADIOLOGY

## 2021-07-14 PROCEDURE — 81001 URINALYSIS AUTO W/SCOPE: CPT | Performed by: EMERGENCY MEDICINE

## 2021-07-14 PROCEDURE — 83605 ASSAY OF LACTIC ACID: CPT | Performed by: STUDENT IN AN ORGANIZED HEALTH CARE EDUCATION/TRAINING PROGRAM

## 2021-07-14 PROCEDURE — 87086 URINE CULTURE/COLONY COUNT: CPT | Performed by: EMERGENCY MEDICINE

## 2021-07-14 PROCEDURE — 99233 SBSQ HOSP IP/OBS HIGH 50: CPT | Performed by: STUDENT IN AN ORGANIZED HEALTH CARE EDUCATION/TRAINING PROGRAM

## 2021-07-14 PROCEDURE — 99233 SBSQ HOSP IP/OBS HIGH 50: CPT | Mod: GC | Performed by: PEDIATRICS

## 2021-07-14 PROCEDURE — 250N000011 HC RX IP 250 OP 636: Performed by: EMERGENCY MEDICINE

## 2021-07-14 PROCEDURE — 87631 RESP VIRUS 3-5 TARGETS: CPT | Performed by: INTERNAL MEDICINE

## 2021-07-14 PROCEDURE — 71045 X-RAY EXAM CHEST 1 VIEW: CPT

## 2021-07-14 PROCEDURE — 36592 COLLECT BLOOD FROM PICC: CPT | Performed by: INTERNAL MEDICINE

## 2021-07-14 PROCEDURE — 96366 THER/PROPH/DIAG IV INF ADDON: CPT

## 2021-07-14 PROCEDURE — 86923 COMPATIBILITY TEST ELECTRIC: CPT | Performed by: PHYSICIAN ASSISTANT

## 2021-07-14 PROCEDURE — 85730 THROMBOPLASTIN TIME PARTIAL: CPT | Performed by: STUDENT IN AN ORGANIZED HEALTH CARE EDUCATION/TRAINING PROGRAM

## 2021-07-14 PROCEDURE — 250N000011 HC RX IP 250 OP 636: Performed by: PHYSICIAN ASSISTANT

## 2021-07-14 PROCEDURE — 85045 AUTOMATED RETICULOCYTE COUNT: CPT | Performed by: PHYSICIAN ASSISTANT

## 2021-07-14 PROCEDURE — 85730 THROMBOPLASTIN TIME PARTIAL: CPT | Performed by: PHYSICIAN ASSISTANT

## 2021-07-14 PROCEDURE — 36415 COLL VENOUS BLD VENIPUNCTURE: CPT | Performed by: STUDENT IN AN ORGANIZED HEALTH CARE EDUCATION/TRAINING PROGRAM

## 2021-07-14 PROCEDURE — 96365 THER/PROPH/DIAG IV INF INIT: CPT

## 2021-07-14 PROCEDURE — 36591 DRAW BLOOD OFF VENOUS DEVICE: CPT | Performed by: STUDENT IN AN ORGANIZED HEALTH CARE EDUCATION/TRAINING PROGRAM

## 2021-07-14 PROCEDURE — 96376 TX/PRO/DX INJ SAME DRUG ADON: CPT

## 2021-07-14 PROCEDURE — 85014 HEMATOCRIT: CPT | Performed by: PHYSICIAN ASSISTANT

## 2021-07-14 PROCEDURE — P9016 RBC LEUKOCYTES REDUCED: HCPCS | Performed by: PHYSICIAN ASSISTANT

## 2021-07-14 PROCEDURE — 80053 COMPREHEN METABOLIC PANEL: CPT | Performed by: PHYSICIAN ASSISTANT

## 2021-07-14 RX ORDER — MORPHINE SULFATE 2 MG/ML
2-4 INJECTION, SOLUTION INTRAMUSCULAR; INTRAVENOUS
Status: DISCONTINUED | OUTPATIENT
Start: 2021-07-14 | End: 2021-07-21

## 2021-07-14 RX ADMIN — SODIUM CHLORIDE, POTASSIUM CHLORIDE, SODIUM LACTATE AND CALCIUM CHLORIDE: 600; 310; 30; 20 INJECTION, SOLUTION INTRAVENOUS at 07:49

## 2021-07-14 RX ADMIN — HEPARIN SODIUM 1400 UNITS/HR: 10000 INJECTION, SOLUTION INTRAVENOUS at 01:02

## 2021-07-14 RX ADMIN — CEFEPIME HYDROCHLORIDE 2 G: 2 INJECTION, POWDER, FOR SOLUTION INTRAVENOUS at 03:30

## 2021-07-14 RX ADMIN — CARBAMIDE PEROXIDE 6.5% 10 DROP: 6.5 LIQUID AURICULAR (OTIC) at 23:29

## 2021-07-14 RX ADMIN — VANCOMYCIN HYDROCHLORIDE 1250 MG: 100 INJECTION, POWDER, LYOPHILIZED, FOR SOLUTION INTRAVENOUS at 09:17

## 2021-07-14 RX ADMIN — VANCOMYCIN HYDROCHLORIDE 1250 MG: 100 INJECTION, POWDER, LYOPHILIZED, FOR SOLUTION INTRAVENOUS at 21:29

## 2021-07-14 RX ADMIN — HEPARIN SODIUM 1100 UNITS/HR: 10000 INJECTION, SOLUTION INTRAVENOUS at 14:18

## 2021-07-14 RX ADMIN — HEPARIN SODIUM 1100 UNITS/HR: 10000 INJECTION, SOLUTION INTRAVENOUS at 20:35

## 2021-07-14 RX ADMIN — CEFEPIME HYDROCHLORIDE 2 G: 2 INJECTION, POWDER, FOR SOLUTION INTRAVENOUS at 12:03

## 2021-07-14 RX ADMIN — CEFEPIME HYDROCHLORIDE 2 G: 2 INJECTION, POWDER, FOR SOLUTION INTRAVENOUS at 20:11

## 2021-07-14 RX ADMIN — ALBUTEROL SULFATE 2 PUFF: 108 INHALANT RESPIRATORY (INHALATION) at 02:00

## 2021-07-14 ASSESSMENT — ACTIVITIES OF DAILY LIVING (ADL): ADLS_ACUITY_SCORE: 14

## 2021-07-14 NOTE — PROGRESS NOTES
Brief Medicine Note:    Notified by Dr. Roper that V/Q scan was reported as concerning for acute on chronic PE.    - holding pta xarelto  - starting heparin gtt without loading dose given pta xarelto  - will monitor with PTT labs rather than Xa due to recent xarelto use    Allison Shaffer PA-C  Internal Medicine [facility] Hospitalist  [facility]  Pager: [phone]

## 2021-07-14 NOTE — PROGRESS NOTES
[facility]    Medicine Progress Note - Hospitalist Service, Gold 8       Date of Admission:  7/13/2021    Assessment & Plan       Jennifer Cervantes is a 21 year old female admitted on 7/13/2021. She has a past medical history significant for sickle cell anemia, history of stroke with right upper extremity hemiparesis, venous thrombosis/PE on xarelto, anxiety, depression, and asthma who presented to the ED from infusion clinic after found to be hypoxic and complains of myalgias, shortness of breath, cough and rhinorrhea. She is admitted to Internal Medicine for further work-up and management.     Acute hypoxic respiratory failure  Possible acute PE  Possible acute chest  Presenting with myalgias, SOB, cough and rhinorrhea. Afebrile. Noted to be hypoxic to 80s at infusion clinic. WBC 14.3. CXR with mild hazy bibasilar opacities favored to be atelectasis vs infection. Started on cefepime/vanco in ED. Exam with occasional coarse breath sounds. Has history of acute chest syndrome requiring exchange transfusions as well as previous PE while on AC. NM scan this admission with possible new acute PE as well. Most likely etiology at this time appears viral vs bacterial infection, acute chest or acute PE.   - Continue vanco/cefepime  - Pending BC x2  - Continue heparin infusion  -Appreciate heme consultation; talk to heme fellow if worsening hypoxia or respiratory status     Sickle cell anemia with pain crisis  Concern for acute chest as above. Did get one unit red cells this admisstion.  -Continue abx  - Hematology consult appreciated  - Start morphine PCA per pain plan:              Continuous rate 1 mg/hr              PCA dose 1 mg              PCA lockout q 20 minutes              1 hour limit 4 mg  - Holding pta oxycontin, prn oxycodone  - Capnography, pulse oximetry  - Continue pta hydea, jadenu     Hypokalemia  Potassium low in setting of poor appetite today while feeling  unwell.     Asthma  PTA on symbicort, albuterol. Has used inhaler this week with no improvement in symptoms.  - Continue pta symbicort BID  - PRN albuterol HFA inhaler or nebulizer     Hx of PE, RUE DVT - Continue heparin infusion for now, DOAC vs other heparinoid on discharge, will talk with heme  Hx of CVA with residual RUE deficits - Remote history of CVA in 2015  Anxiety/Depression - Continue pta zoloft 200 mg daily, abilify 2 mg daily             Diet: Combination Diet Regular Diet Adult    DVT Prophylaxis: heparin infusion  Lopez Catheter: Not present  Central Lines: None  Code Status: Full Code      Disposition Plan   Expected discharge: 3-4 days recommended to prior living arrangement once adequate pain management/ tolerating PO medications, antibiotic plan established and hemoglobin stable.     The patient's care was discussed with the Bedside Nurse and Patient.    Alberto Larson MD  Hospitalist Service, [address]  Securely message with the Vocera Web Console (learn more here)  Text page via [employer] Paging/Directory  Please see sign in/sign out for up to date coverage information      ______________________________________________________________________    Interval History   Still feels short of breath and overall feels pretty crummy still. She says that this feels worse than her normal flare of sickle cell disease.    Data reviewed today: I reviewed all medications, new labs and imaging results over the last 24 hours. I personally reviewed the chest x-ray image(s) showing clear lungs.    Physical Exam   Vital Signs: Temp: 98.5  F (36.9  C) Temp src: Oral BP: 118/75 Pulse: 104   Resp: 24 SpO2: 96 % O2 Device: Oxymask Oxygen Delivery: 3 LPM  Weight: 0 lbs 0 oz  Exam  Gen: awake and alert, appears distressed 2/2 pain and respiratory distress  HEENT: NCAT, sclerae anicteric  CV: RRR, S1/S2, extremities warm and well perfused, no lower extremity edema  Pulm:  increased work of breathing on facemask O2, bilateral coarse sounds  GI: Nontender, nondistended  Skin: warm, no jaundice  Neuro: Aox3, speech normal, moves all extremities symmetrically and equally  Psych: mood is down      Data   Recent Labs   Lab 07/13/21  1631 07/13/21  1630 07/13/21  1024 07/12/21  0338 07/10/21  0339   WBC  --  14.3* 15.0* 11.8* 15.8*   HGB  --  7.4* 7.3* 8.2* 7.8*   MCV  --  90 89 90 91   PLT  --  274 268 292 290     --  138 137 140   POTASSIUM 3.3*  --  3.2* 3.7 3.9   CHLORIDE 108  --  111* 108 112*   CO2 21  --  20 21 22   BUN 6*  --  6* 6* 9   CR 0.65  --  0.60 0.58 0.50*   ANIONGAP 7  --  7 8 6   MICAH 8.7  --  8.5 9.1 8.4*   GLC 85  --  106* 93 101*   ALBUMIN 4.3  --  4.1 4.1 4.0   PROTTOTAL 8.1  --  7.9 8.0 7.6   BILITOTAL 6.3*  --  7.2* 3.4* 3.6*   ALKPHOS 73  --  73 72 69   ALT 72*  --  65* 68* 70*   AST 74*  --  68* 71* 73*   LIPASE  --   --   --   --  145

## 2021-07-14 NOTE — CONSULTS
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 She  was admitted on 7/13/2021 with dyspnea and cough concerning for acute chest syndrome.    Current symptoms of hypoxia concerning for acute chest syndrome. Received RBC transfusion on 7/13 with no interval improvement in symptoms. Will continue supportive cares now, however low threshold for repeat chest imaging and initiation of exchange transfusion. Additionally, concern for PE however less likely given compliance with rivaroxaban however if patient willing could repeat V/Q scan.     Hematology/Oncology Problem list:   # Acute hypoxic respiratory failure concerning for Acute chest syndrome  # Sickle Cell anemia with history of acute chest syndrome  # Right innominate vein thrombosis + 3 moderate subsegmental perfusion defects (2/1/21) on rivaroxaban    Summary of Recommendations:    Agree with abx for tx of presumed pneumonia/acute chest syndrome    Low threshold to repeat chest imaging if worsening hypoxia, or fever    If febrile collect blood cultures , complete repeat chest xray, and call hematology    Patient hesitant to undergo exchange transfusion, but if change in respiratory status call hematology fellow or attending on call.    Continue pain management per pain plan    ER/Acute  Care/Infusion Clinic:       ? Morphine 2 mg IVP/SC Q1H X 3 doses  ? Toradol x1 (avoid through 7/27/21 due to being on anticoagulation)  ? Maintenance IV fluids with LR  ? Other: Benadryl PO and Zofran 8 mg IV PRN itching or nausea    Inpatient:  ? Opioid: Morphine 2 mg IV Q1H PRN until PCA starts  ? PCA plan:     PCA button dose: Morphine 1 mg    Lockout: 20 minutes    Continuous Infusion: consider 1 mg/hr    One hr max: 4 mg  ? Other Medications: Benadryl, Zofran  ? If PCA not working, she Has had success with ketamine starting at 4mg/h and advancing only to 6mg/h, as 8mg/h made her feel quite poorly.  ? ASA on hold  ? Supportive Care: Docusate, Senna    Transition from heparin gtt to rivaroxaban     Please ensure adequate bowel regimen.     Encourage incentive spirometry.        Thank you for involving us in the care of this patient. We will continue to follow during the hospitalization.    Patient was seen and plan of care developed with Dr. Duncan.    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 At this time she was found to be hypoxic and was sent to ED for further evaluation. Currently, she still complains of cough and has had no improvement in symptoms since arrival to ED.    Hematology History: (adapted from outpatient documentation)  She was admitted 2/1/21-2/3/21 with a new PE, started on Rivaroxaban. Switched to Eliquis 3/25/21 with RUE DVT.     She was admitted 4/26/21-5/11/21 with sickle cell pain crisis  complicated by worsening PE in setting of low Apixaban levels, acute hypoxic respiratory failure, pneumonia, acute chest syndrome s/p exchange transfusion on 4/30 and 5/4. After 2nd exchange her oxygen requirement dramatically improved from 20L to 1-2L 5/5 and she was off oxygen as of 5/6.     Review of Systems    The 10 point Review of Systems is negative other than noted in the HPI.     Past Medical History    Past Medical History:   Diagnosis Date     Anxiety      Bleeding disorder (H)      Blood clotting disorder (H)      Cerebral infarction (H) 2015     Cognitive developmental delay     low IQ. 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 (H)     right hand contractures     Iron overload due to repeated red blood cell transfusions      Migraines      Multiple subsegmental pulmonary emboli without acute cor pulmonale (H) 02/01/2021     Oppositional defiant behavior      Superficial venous thrombosis of arm, right 03/25/2021     Uncomplicated asthma        Past Surgical History   Past Surgical History:   Procedure Laterality Date     AS INSERT TUNNELED CV 2 CATH W/O PORT/PUMP       C BREAST REDUCTION (INCLUDES LIPO) TIER 3 Bilateral 04/23/2019     CHOLECYSTECTOMY       INSERT PORT VASCULAR ACCESS Left 4/21/2021    Procedure: INSERTION, VASCULAR ACCESS PORT (NOT SURE ON SIDE UNTIL REMOVAL);  Surgeon: Rajan More MD;  Location: [facility] OR     IR CHEST PORT PLACEMENT > 5 YRS OF AGE  4/21/2021     IR CVC NON TUNNEL LINE REMOVAL  5/6/2021     IR CVC NON TUNNEL PLACEMENT  04/07/2020     IR CVC NON TUNNEL PLACEMENT  4/30/2021     IR PORT REMOVAL LEFT  4/21/2021     REMOVE PORT VASCULAR ACCESS Left 4/21/2021    Procedure: REMOVAL, VASCULAR ACCESS PORT LEFT;  Surgeon: Rajan More MD;  Location: [facility] OR     REPAIR TENDON ELBOW Right 10/02/2019    Procedure: Right Elbow Flexor Lengthening, Flexor  Pronator Slide Of Wrist and Finger, Thumb Adductor Lengthening;  Surgeon: Anai Franco MD;  Location: [facility] OR     TONSILLECTOMY Bilateral 10/02/2019    Procedure: Bilateral Tonsillectomy;  Surgeon: Farhana Guy MD;  Location: [facility] OR       Social History   Social History     Tobacco Use     Smoking status: Never Smoker     Smokeless tobacco: Never Used   Substance Use Topics     Alcohol use: Not Currently     Alcohol/week: 0.0 standard drinks     Drug use: Never       Family History   Family History   Problem Relation Age of Onset     Sickle Cell Trait Mother      Hypertension Mother      Asthma Mother      Sickle Cell Trait Father        Prior to Admission Medications   Prior to Admission Medications   Prescriptions Last Dose Informant Patient Reported? Taking?   ARIPiprazole (ABILIFY) 2 MG tablet 7/13/2021 at pm Self No Yes   Sig: Take 1 tablet (2 mg) by mouth daily   EPINEPHrine (ANY BX GENERIC EQUIV) 0.3 MG/0.3ML injection 2-pack  at prn Self No Yes   Sig: Inject 0.3 mLs (0.3 mg) into the muscle as needed for anaphylaxis   Hydroxyurea 1000 MG TABS 7/13/2021 at pm Self No Yes   Sig: Take 2,000 mg by mouth daily   JADENU 360 MG tablet 7/13/2021 at pm Self No Yes   Sig: Take 4 tablets (1,440 mg) by mouth every evening   acetaminophen (TYLENOL) 325 MG tablet  at prn Self No Yes   Sig: Take 2 tablets (650 mg) by mouth every 6 hours as needed for mild pain   albuterol (PROAIR HFA/PROVENTIL HFA/VENTOLIN HFA) 108 (90 Base) MCG/ACT inhaler  at prn Self No Yes   Sig: Inhale 2 puffs into the lungs every 6 hours as needed for shortness of breath / dyspnea or wheezing   albuterol (PROVENTIL) (2.5 MG/3ML) 0.083% neb solution  at prn Self No Yes   Sig: Take 1 vial (2.5 mg) by nebulization every 6 hours as needed for shortness of breath / dyspnea or wheezing   budesonide-formoterol (SYMBICORT) 160-4.5 MCG/ACT Inhaler 7/13/2021 at pm Self Yes Yes   Sig: Inhale 1 puff into the lungs every evening    diclofenac (VOLTAREN) 1 % topical gel  at prn Self No Yes   Sig: Apply 4 g topically 4 times daily as needed for moderate pain Apply to back, legs, and arms for pain   diphenhydrAMINE (BENADRYL) 25 MG capsule  at prn Self No Yes   Sig: Take 1-2 capsules (25-50 mg) by mouth nightly as needed for sleep   hydrOXYzine (ATARAX) 25 MG tablet  at prn Self No Yes   Sig: Take 1 tablet (25 mg) by mouth 3 times daily as needed for anxiety   lidocaine-prilocaine (EMLA) 2.5-2.5 % external cream  at prn Self No Yes   Sig: Apply topically as needed for moderate pain   medroxyPROGESTERone (DEPO-PROVERA) 150 MG/ML IM injection   Yes No   Sig: Inject 150 mg into the muscle   naloxone (NARCAN) 4 MG/0.1ML nasal spray  Self No Yes   Sig: Spray 1 spray (4 mg) into one nostril alternating nostrils once as needed for opioid reversal every 2-3 minutes until assistance arrives   ondansetron (ZOFRAN) 8 MG tablet  at prn Self Yes Yes   Sig: Take 8 mg by mouth every 8 hours as needed    oxyCODONE (OXYCONTIN) 10 MG 12 hr tablet 7/13/2021 at pm Self No Yes   Sig: Take 1 tablet (10 mg) by mouth every 12 hours   oxyCODONE IR (ROXICODONE) 15 MG tablet  at prn Self No Yes   Sig: Take 1 tablet (15mg) by mouth every 4-6 hours as needed for severe pain. Goal 4 per day. Max 6 per day.   rivaroxaban ANTICOAGULANT (XARELTO ANTICOAGULANT) 20 MG TABS tablet 7/13/2021 at pm Self No Yes   Sig: Take 1 tablet (20 mg) by mouth daily (with dinner) Starting on 5/29 after completing your 15mg twice daily dosing on 5/28   sertraline (ZOLOFT) 100 MG tablet 7/13/2021 at pm Self No Yes   Sig: Take 2 tablets (200 mg) by mouth daily      Facility-Administered Medications Last Administration Doses Remaining   medroxyPROGESTERone (DEPO-PROVERA) syringe 150 mg 5/26/2021  2:00 PM 3          Allergies      Allergies   Allergen Reactions     Contrast Dye      Hives and breathing issues     Fish-Derived Products Hives     Seafood Hives     Diagnostic X-Ray Materials       Gadolinium        Physical Exam   Vital Signs: Temp: 98.4  F (36.9  C)   BP: 129/81 Pulse: 99   Resp: 24 SpO2: 96 % O2 Device: Oxymask Oxygen Delivery: 3 LPM  Weight: 0 lbs 0 oz      GENERAL: Laying in bed sleeping, awakes to voice and touch  HEENT: AT/NC,  RESP: mildly labored breathing on venti mask, intermittent cough, coarse breath sounds bl  CARDIAC: on telemetry  EXTREMITIES: No LE edema, 2+ DP and radial pulses bialterally  SKIN: Warm and dry, no jaundice or rash on limited exam  NEURO: speech fluent, mentation intact  PSYCH: withdrawn, mood, linear thought.       Data     Results for orders placed or performed during the hospital encounter of 07/13/21 (from the past 24 hour(s))   CBC with platelets differential    Narrative    The following orders were created for panel order CBC with platelets differential.  Procedure                               Abnormality         Status                     ---------                               -----------         ------                     CBC with platelets and d...[[id number]]  Abnormal            Final result                 Please view results for these tests on the individual orders.   CBC with platelets and differential   Result Value Ref Range    WBC Count 14.3 (H) 4.0 - 11.0 10e3/uL    RBC Count 2.37 (L) 3.80 - 5.20 10e6/uL    Hemoglobin 7.4 (L) 11.7 - 15.7 g/dL    Hematocrit 21.2 (L) 35.0 - 47.0 %    MCV 90 78 - 100 fL    MCH 31.2 26.5 - 33.0 pg    MCHC 34.9 31.5 - 36.5 g/dL    RDW 22.2 (H) 10.0 - 15.0 %    Platelet Count 274 150 - 450 10e3/uL    % Neutrophils 60 %    % Lymphocytes 15 %    % Monocytes 10 %    % Eosinophils 12 %    % Basophils 2 %    % Immature Granulocytes 1 %    NRBCs per 100 WBC 3 (H) <1 /100    Absolute Neutrophils 8.6 (H) 1.6 - 8.3 10e3/uL    Absolute Lymphocytes 2.2 0.8 - 5.3 10e3/uL    Absolute Monocytes 1.4 (H) 0.0 - 1.3 10e3/uL    Absolute Eosinophils 1.8 (H) 0.0 - 0.7 10e3/uL    Absolute Basophils 0.2 0.0 - 0.2 10e3/uL    Absolute  Immature Granulocytes 0.1 (H) <=0.0 10e3/uL    Absolute NRBCs 0.4 10e3/uL   Comprehensive metabolic panel   Result Value Ref Range    Sodium 136 133 - 144 mmol/L    Potassium 3.3 (L) 3.4 - 5.3 mmol/L    Chloride 108 94 - 109 mmol/L    Carbon Dioxide (CO2) 21 20 - 32 mmol/L    Anion Gap 7 3 - 14 mmol/L    Urea Nitrogen 6 (L) 7 - 30 mg/dL    Creatinine 0.65 0.52 - 1.04 mg/dL    Calcium 8.7 8.5 - 10.1 mg/dL    Glucose 85 70 - 99 mg/dL    Alkaline Phosphatase 73 40 - 150 U/L    AST 74 (H) 0 - 45 U/L    ALT 72 (H) 0 - 50 U/L    Protein Total 8.1 6.8 - 8.8 g/dL    Albumin 4.3 3.4 - 5.0 g/dL    Bilirubin Total 6.3 (H) 0.2 - 1.3 mg/dL    GFR Estimate >90 >60 mL/min/1.73m2   Lactic acid whole blood   Result Value Ref Range    Lactic Acid 0.8 0.7 - 2.0 mmol/L   Procalcitonin   Result Value Ref Range    Procalcitonin 0.30 <5.00 ng/mL   [facility] Draw    Narrative    The following orders were created for panel order [facility] Draw.  Procedure                               Abnormality         Status                     ---------                               -----------         ------                     Extra Blue Top Tube[[id number]]                              Final result               Extra Red Top Tube[[id number]]                               Final result               Extra Purple Top Tube[[id number]]                            Final result                 Please view results for these tests on the individual orders.   Extra Blue Top Tube   Result Value Ref Range    Hold Specimen JIC    Extra Red Top Tube   Result Value Ref Range    Hold Specimen JIC    Extra Purple Top Tube   Result Value Ref Range    Hold Specimen JIC    Reticulocyte count   Result Value Ref Range    % Reticulocyte 28.1 (H) 0.5 - 2.0 %    Absolute Reticulocyte 0.665 (H) 0.025 - 0.095 10e6/uL   ABO/Rh type and screen    Narrative    The following orders were created for panel order ABO/Rh type and screen.  Procedure                               Abnormality          Status                     ---------                               -----------         ------                     Adult Type and Screen[[id number]]                            Final result                 Please view results for these tests on the individual orders.   Adult Type and Screen   Result Value Ref Range    ABO/RH(D) O POS     Antibody Screen Negative Negative    SPECIMEN EXPIRATION DATE [id number]     Narrative    Hx in SQ and Epic (Care Everywhere)   ABO/Rh type and screen    Narrative    The following orders were created for panel order ABO/Rh type and screen.  Procedure                               Abnormality         Status                     ---------                               -----------         ------                     Adult Type and Screen[[id number]]                            In process                   Please view results for these tests on the individual orders.   XR Chest Port 1 View    Narrative    XR CHEST PORT 1 VIEW  7/13/2021 4:43 PM      HISTORY: hypoxia. Sickle cell disease.    COMPARISON: 7/13/2021    FINDINGS:   Single AP view the chest. Left chest port tip overlying the superior  cavoatrial junction. Trachea is midline. Cardiac silhouette is similar  in size. No pneumothorax or significant pleural effusion. Mildly  decreased hazy bibasilar opacities.      Impression    IMPRESSION:   Mildly decreased hazy bibasilar opacities, favored atelectasis versus  less likely infection.    I have personally reviewed the examination and initial interpretation  and I agree with the findings.    MANSI GAFFNEY MD         SYSTEM ID:  [id number]   Symptomatic COVID-19 Virus (Coronavirus) by PCR Nasopharyngeal    Specimen: Nasopharyngeal; Swab    Narrative    The following orders were created for panel order Symptomatic COVID-19 Virus (Coronavirus) by PCR Nasopharyngeal.  Procedure                               Abnormality         Status                     ---------                                -----------         ------                     SARS-COV2 (COVID-19) Vir...[[id number]]  Normal              Final result                 Please view results for these tests on the individual orders.   SARS-COV2 (COVID-19) Virus RT-PCR    Specimen: Nasopharyngeal; Swab   Result Value Ref Range    SARS CoV2 PCR Negative Negative    Narrative    Testing was performed using the Xpert Xpress SARS-CoV-2 Assay on the  Cepheid Gene-Xpert Instrument Systems. Additional information about  this Emergency Use Authorization (EUA) assay can be found via the Lab  Guide. This test should be ordered for the detection of SARS-CoV-2 in  individuals who meet SARS-CoV-2 clinical and/or epidemiological  criteria. Test performance is unknown in asymptomatic patients. This  test is for in vitro diagnostic use under the FDA EUA for  laboratories certified under CLIA to perform high complexity testing.  This test has not been FDA cleared or approved. A negative result  does not rule out the presence of PCR inhibitors in the specimen or  target RNA in concentration below the limit of detection for the  assay. The possibility of a false negative should be considered if  the patient's recent exposure or clinical presentation suggests  COVID-19. This test was validated by the [facility]. This laboratory is certified under  the Clinical Laboratory Improvement Amendments of 1988 (CLIA-88) as  qualified to perform high complexity laboratory testing.     Blood Culture Peripheral Blood    Specimen: Peripheral Blood   Result Value Ref Range    Culture No growth after 12 hours    ABO/Rh type and screen *Canceled*    Narrative    The following orders were created for panel order ABO/Rh type and screen.  Procedure                               Abnormality         Status                     ---------                               -----------         ------                       Please view results  for these tests on the individual orders.   CBC with platelets differential    Narrative    The following orders were created for panel order CBC with platelets differential.  Procedure                               Abnormality         Status                     ---------                               -----------         ------                     CBC with platelets and d...[[id number]]                                                   Please view results for these tests on the individual orders.   NM Lung Scan Perfusion Particulate    Impression    RESIDENT PRELIMINARY INTERPRETATION  Impression:   1. At least one new segmental perfusion defect in the left upper  quadrant in a background of known subsegmental and segmental perfusion  defects, suggestive of acute on chronic pulmonary emboli.  2. Clear lungs on SPECT-CT.    [Urgent Result: Probable acute on chronic pulmonary emboli.]    Finding was identified on 7/13/2021 10:36 PM.     Dr. Roper was contacted by Dr. Cobb at 7/13/2021 10:45 PM and  verbalized understanding of the urgent finding.     Influenza A and B & RSV by PCR    Specimen: Nasopharyngeal; Swab   Result Value Ref Range    Influenza A target Negative Negative    Influenza B target Negative Negative    RSV target Negative Negative    Narrative    The [employer] Xpert  Xpress Flu/RSV Assay, performed on the Ini3 Digital  Instrument Systems, is an automated, multiplex real-time, reverse transcriptase polymerase chain reaction (RT-PCR) assay intended for the in vitro qualitative detection and differentiation of influenza A, influenza B, and respiratory syncytial virus (RSV) viral RNA. The Xpert Xpress Flu/RSV Assay uses nasopharyngeal swab and nasal swab specimens collected from patients with signs and symptoms of respiratory infection. The Xpert Xpress Flu/RSV Assay is intended as an aid in the diagnosis of influenza and respiratory syncytial virus infections in conjunction with clinical and epidemiological  risk factors.   Negative results do not preclude influenza virus or RSV infection and should not be used as the sole basis for treatment or other patient management decisions.   XR Chest Port 1 View    Narrative    EXAM: XR CHEST PORT 1 VIEW  LOCATION: [facility]  DATE/TIME: 7/14/2021 4:53 AM    INDICATION: SOB  COMPARISON: Yesterday.      Impression    IMPRESSION: Left internal jugular venous Port-A-Cath terminates near the superior cavoatrial junction. Mild elevation of the right hemidiaphragm. Lungs appear clear. No pleural fluid or pneumothorax. Normal heart size and pulmonary vascularity.   ABO/Rh type and screen *Canceled*    Narrative    The following orders were created for panel order ABO/Rh type and screen.  Procedure                               Abnormality         Status                     ---------                               -----------         ------                     Adult Type and Screen[[id number]]                                                         Please view results for these tests on the individual orders.   PTT   Result Value Ref Range    aPTT 141 (HH) 22 - 38 Seconds         I have personally reviewed the following labs/imaging:  CBC  Recent Labs   Lab 07/13/21  1630 07/13/21  1024 07/12/21  0338 07/10/21  0339   WBC 14.3* 15.0* 11.8* 15.8*   RBC 2.37* 2.38* 2.55* 2.44*   HGB 7.4* 7.3* 8.2* 7.8*   HCT 21.2* 21.2* 23.0* 22.2*   MCV 90 89 90 91   MCH 31.2 30.7 32.2 32.0   MCHC 34.9 34.4 35.7 35.1   RDW 22.2* 21.4* 23.7* 24.0*    268 292 290     CMP  Recent Labs   Lab 07/13/21  1631 07/13/21  1024 07/12/21  0338 07/10/21  0339 07/08/21  0331    138 137 140 136   POTASSIUM 3.3* 3.2* 3.7 3.9 3.6   CHLORIDE 108 111* 108 112* 107   CO2 21 20 21 22 22   ANIONGAP 7 7 8 6 8   GLC 85 106* 93 101* 89   BUN 6* 6* 6* 9 9   CR 0.65 0.60 0.58 0.50* 0.59   GFRESTIMATED >90 >90 >90 >90 >90   GFRESTBLACK  --   --   --  >90 >90   MICAH 8.7 8.5 9.1 8.4* 9.0   PROTTOTAL 8.1 7.9  8.0 7.6 8.2   ALBUMIN 4.3 4.1 4.1 4.0 4.3   BILITOTAL 6.3* 7.2* 3.4* 3.6* 4.0*   ALKPHOS 73 73 72 69 73   AST 74* 68* 71* 73* 82*   ALT 72* 65* 68* 70* 74*     INRNo lab results found in last 7 days.

## 2021-07-15 LAB
ALBUMIN SERPL-MCNC: 3.5 G/DL (ref 3.4–5)
ALP SERPL-CCNC: 65 U/L (ref 40–150)
ALT SERPL W P-5'-P-CCNC: 81 U/L (ref 0–50)
ANION GAP SERPL CALCULATED.3IONS-SCNC: 6 MMOL/L (ref 3–14)
APTT PPP: 89 SECONDS (ref 22–38)
APTT PPP: 91 SECONDS (ref 22–38)
AST SERPL W P-5'-P-CCNC: 89 U/L (ref 0–45)
BACTERIA UR CULT: NO GROWTH
BILIRUB SERPL-MCNC: 4.5 MG/DL (ref 0.2–1.3)
BUN SERPL-MCNC: 6 MG/DL (ref 7–30)
CALCIUM SERPL-MCNC: 8.3 MG/DL (ref 8.5–10.1)
CHLORIDE BLD-SCNC: 111 MMOL/L (ref 94–109)
CO2 SERPL-SCNC: 22 MMOL/L (ref 20–32)
CREAT SERPL-MCNC: 0.59 MG/DL (ref 0.52–1.04)
ERYTHROCYTE [DISTWIDTH] IN BLOOD BY AUTOMATED COUNT: 22.2 % (ref 10–15)
GFR SERPL CREATININE-BSD FRML MDRD: >90 ML/MIN/1.73M2
GLUCOSE BLD-MCNC: 84 MG/DL (ref 70–99)
HCT VFR BLD AUTO: 21.5 % (ref 35–47)
HGB BLD-MCNC: 7.5 G/DL (ref 11.7–15.7)
MCH RBC QN AUTO: 31.5 PG (ref 26.5–33)
MCHC RBC AUTO-ENTMCNC: 34.9 G/DL (ref 31.5–36.5)
MCV RBC AUTO: 90 FL (ref 78–100)
MRSA DNA SPEC QL NAA+PROBE: NEGATIVE
PLATELET # BLD AUTO: 295 10E3/UL (ref 150–450)
POTASSIUM BLD-SCNC: 3.6 MMOL/L (ref 3.4–5.3)
PROT SERPL-MCNC: 6.9 G/DL (ref 6.8–8.8)
RBC # BLD AUTO: 2.38 10E6/UL (ref 3.8–5.2)
RETICS # AUTO: 0.55 10E6/UL (ref 0.03–0.1)
RETICS/RBC NFR AUTO: 23.4 % (ref 0.5–2)
SA TARGET DNA: NEGATIVE
SODIUM SERPL-SCNC: 139 MMOL/L (ref 133–144)
VANCOMYCIN SERPL-MCNC: 11.8 MG/L
WBC # BLD AUTO: 12.6 10E3/UL (ref 4–11)

## 2021-07-15 PROCEDURE — 87641 MR-STAPH DNA AMP PROBE: CPT | Performed by: STUDENT IN AN ORGANIZED HEALTH CARE EDUCATION/TRAINING PROGRAM

## 2021-07-15 PROCEDURE — 99232 SBSQ HOSP IP/OBS MODERATE 35: CPT | Performed by: PEDIATRICS

## 2021-07-15 PROCEDURE — 250N000013 HC RX MED GY IP 250 OP 250 PS 637: Performed by: STUDENT IN AN ORGANIZED HEALTH CARE EDUCATION/TRAINING PROGRAM

## 2021-07-15 PROCEDURE — 99233 SBSQ HOSP IP/OBS HIGH 50: CPT | Performed by: STUDENT IN AN ORGANIZED HEALTH CARE EDUCATION/TRAINING PROGRAM

## 2021-07-15 PROCEDURE — 80202 ASSAY OF VANCOMYCIN: CPT | Performed by: STUDENT IN AN ORGANIZED HEALTH CARE EDUCATION/TRAINING PROGRAM

## 2021-07-15 PROCEDURE — 250N000013 HC RX MED GY IP 250 OP 250 PS 637: Performed by: PHYSICIAN ASSISTANT

## 2021-07-15 PROCEDURE — 250N000011 HC RX IP 250 OP 636: Performed by: PHYSICIAN ASSISTANT

## 2021-07-15 PROCEDURE — 258N000003 HC RX IP 258 OP 636: Performed by: EMERGENCY MEDICINE

## 2021-07-15 PROCEDURE — 250N000011 HC RX IP 250 OP 636: Performed by: EMERGENCY MEDICINE

## 2021-07-15 PROCEDURE — 120N000002 HC R&B MED SURG/OB UMMC

## 2021-07-15 PROCEDURE — 85730 THROMBOPLASTIN TIME PARTIAL: CPT | Performed by: STUDENT IN AN ORGANIZED HEALTH CARE EDUCATION/TRAINING PROGRAM

## 2021-07-15 PROCEDURE — 36415 COLL VENOUS BLD VENIPUNCTURE: CPT | Performed by: STUDENT IN AN ORGANIZED HEALTH CARE EDUCATION/TRAINING PROGRAM

## 2021-07-15 PROCEDURE — 80053 COMPREHEN METABOLIC PANEL: CPT | Performed by: STUDENT IN AN ORGANIZED HEALTH CARE EDUCATION/TRAINING PROGRAM

## 2021-07-15 PROCEDURE — 85027 COMPLETE CBC AUTOMATED: CPT | Performed by: HOSPITALIST

## 2021-07-15 PROCEDURE — 36592 COLLECT BLOOD FROM PICC: CPT | Performed by: STUDENT IN AN ORGANIZED HEALTH CARE EDUCATION/TRAINING PROGRAM

## 2021-07-15 PROCEDURE — 85045 AUTOMATED RETICULOCYTE COUNT: CPT | Performed by: STUDENT IN AN ORGANIZED HEALTH CARE EDUCATION/TRAINING PROGRAM

## 2021-07-15 RX ORDER — DABIGATRAN ETEXILATE 150 MG/1
150 CAPSULE ORAL 2 TIMES DAILY
Status: DISCONTINUED | OUTPATIENT
Start: 2021-07-15 | End: 2021-07-25 | Stop reason: HOSPADM

## 2021-07-15 RX ADMIN — DOCUSATE SODIUM 50 MG AND SENNOSIDES 8.6 MG 2 TABLET: 8.6; 5 TABLET, FILM COATED ORAL at 21:25

## 2021-07-15 RX ADMIN — SERTRALINE HYDROCHLORIDE 200 MG: 100 TABLET ORAL at 21:24

## 2021-07-15 RX ADMIN — DABIGATRAN ETEXILATE MESYLATE 150 MG: 150 CAPSULE ORAL at 20:13

## 2021-07-15 RX ADMIN — CARBAMIDE PEROXIDE 6.5% 10 DROP: 6.5 LIQUID AURICULAR (OTIC) at 21:25

## 2021-07-15 RX ADMIN — VANCOMYCIN HYDROCHLORIDE 1250 MG: 100 INJECTION, POWDER, LYOPHILIZED, FOR SOLUTION INTRAVENOUS at 10:13

## 2021-07-15 RX ADMIN — CARBAMIDE PEROXIDE 6.5% 5 DROP: 6.5 LIQUID AURICULAR (OTIC) at 11:25

## 2021-07-15 RX ADMIN — CEFEPIME HYDROCHLORIDE 2 G: 2 INJECTION, POWDER, FOR SOLUTION INTRAVENOUS at 02:49

## 2021-07-15 RX ADMIN — ARIPIPRAZOLE 2 MG: 2 TABLET ORAL at 21:25

## 2021-07-15 RX ADMIN — HYDROXYUREA 2000 MG: 500 CAPSULE ORAL at 21:25

## 2021-07-15 ASSESSMENT — ACTIVITIES OF DAILY LIVING (ADL)
ADLS_ACUITY_SCORE: 13

## 2021-07-15 NOTE — PLAN OF CARE
Time: 8002-9380    Reason for admission: PE, AHRF    A&Ox4, lethargic, but easily awoken. VSS on 3-4L oxymask. Pt c/o generalized pain. Morphine PCA pump in use. Pt denies nausea. RUE hemiparesis. LR @ 125. Administered IV abx. Hep gtt @ 1000U/hr. Monitoring with PTT - next check PTT 0445. Some wheezing noted.     Plan: Continue pain plan, abx, hep gtt. Monitor hematology labs. Will continue to monitor.

## 2021-07-15 NOTE — PLAN OF CARE
"Time: 2731-7955  Admitted from [facility].   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 Currently on PCA Morphine with some relief of pain. Pt intermittently lethargic, but easily awoken.    [Respiratory]: Pt SOB w/ exertion. On 3 L O2 via oxyplus mask    [Cardiac]: Pt has tachycardia    [Neuros]: WDL, pt intermittently lethargic, but calling appropriately for assistance . Pt has baseline R sided weakness w/ hx previous stroke.    [GI]:Orders Placed This Encounter      Combination Diet Regular Diet Adult  -Tolerated PO intake, pt ate snacks brought in from home.    []: Pt has urinary hesitancy, stated that she \"didn't feel\" like she needed to void. Pt bladder scanned for 814 ml and voided 900 ml.         [Skin/Wounds]: WDL    [Lines]:    Peripheral IV 07/13/21 Anterior;Left Upper forearm (Active)   Site Assessment WDL 07/14/21 1800   Line Status Infusing 07/14/21 1800   Dressing Intervention New dressing  07/13/21 2025   Phlebitis Scale 0-->no symptoms 07/14/21 1800   Infiltration Scale 0 07/14/21 1800   Infiltration Site Treatment Method  None 07/13/21 2025   Number of days: 1       Peripheral IV 07/14/21 Anterior;Right Upper forearm (Active)   Site Assessment WDL 07/14/21 1800   Line Status Infusing 07/14/21 1800   Phlebitis Scale 0-->no symptoms 07/14/21 1800   Infiltration Scale 0 07/14/21 1800   Number of days: 0       Port A Cath Single 04/21/21 Left Chest wall (Active)   Site " Assessment WDL 07/14/21 1752   Dressing Status clean;dry;intact 07/14/21 1752   Dressing Intervention Transparent 07/14/21 1752   Line Status Blood return noted;Infusing 07/13/21 1046   Access Date 07/13/21 07/13/21 1046   Access Attempts 1 07/13/21 1046   Gauge Power noncoring 90 degree bend;20 gauge;3/4 inch 07/13/21 1046   Number of days: 84       [Infusions]: Pt has heparin gtt(currently stopped, and to be decreased by 100 units in an hour).  1 U PRBC in ED today, vancomyocin, cefepime infused.  Pt also running LR @ 125/hr.  Morphine PCA on continuous infusion @ 1 mg/hr.     [Activity]: Pt up w/ SBA to BSC  [Labs/Lactic]:   Hemoglobin (g/dL)   Date Value   07/14/2021 7.6 (L)   07/10/2021 7.8 (L)     Creatinine (mg/dL)   Date Value   07/14/2021 0.64   07/10/2021 0.50 (L)     Platelet Count   Date Value   07/14/2021 253 10e3/uL   07/10/2021 290 10e9/L     Hematocrit (%)   Date Value   07/14/2021 21.9 (L)   07/10/2021 22.2 (L)     WBC (10e9/L)   Date Value   07/10/2021 15.8 (H)     WBC Count (10e3/uL)   Date Value   07/14/2021 14.0 (H)     PTT (sec)   Date Value   04/03/2021 128 (HH)   03/28/2021 64 (H)   03/20/2021 32     aPTT (Seconds)   Date Value   07/14/2021 104 (H)   07/14/2021 94 (H)   07/14/2021 141 (HH)      Lactic Acid (mmol/L)   Date Value   07/14/2021 0.8   07/13/2021 0.8   06/23/2021 0.8   06/20/2021 0.9       [Electrolytes]:   Potassium (mmol/L)   Date Value   07/14/2021 3.8   07/10/2021 3.9     Magnesium (mg/dL)   Date Value   02/21/2021 1.7     Phosphorus (mg/dL)   Date Value   02/21/2021 3.6     Sodium   Date Value Ref Range Status   07/14/2021 140 133 - 144 mmol/L Final   07/10/2021 140 133 - 144 mmol/L Final         Other: Pt triggered sepsis, lactic 0.8  Plan:   Monitor for worsening respiratory status, s/s infection. Continue w/ IV pain management, fluids, and anticoagulant.

## 2021-07-15 NOTE — PROGRESS NOTES
Physician Attestation   I, Alberto Larson, was present with the medical/ANDREAS student who participated in the service and in the documentation of the note.  I have verified the history and personally performed the physical exam and medical decision making.  I agree with the assessment and plan of care as documented in the note.      I personally reviewed vital signs, medications, labs and imaging.    Gen: awake and alert, appears comfortable  HEENT: NCAT, MMM  CV: RRR, S1/S2, extremities warm and well perfused, no lower extremity edema  Pulm: work of breathing improved from day prior, mild increase in work of breathing  Skin: warm, no jaundice  Neuro: Aox3, speech normal, moves all extremities symmetrically and equally  Psych: mood is better, not interested in talking much though      AHRF - likely from PE, will stop heparin 8pm and transition to dabigatran. C/f acute chest and PNA is low and will stop abx.   Sickle cell pain crisis - continue PCA.   Hx CVA - continue to hold ASA while on OAC. Possibly restart on discharge but defer to hematology.   Hypokalemia, resolved.    Alberto Larson MD       ---------------------------------------  Date of Service (when I saw the patient): 07/15/21       INTERNAL MEDICINE   DAILY PROGRESS NOTE  Gold 8      Jennifer Cervantes ([id number])   Admission Date: 7/13/2021  Date of Service: 07/15/2021      Changes Today     - f/u blood cultures  - Continue heparin  - continue vanco/cefepime  - Continue morphine PCA plan    Assessment & Plan   Jennifer Cervantes is a 21 year old female admitted on 7/13/2021. She has a past medical history significant for sickle cell anemia, history of stroke with right upper extremity hemiparesis, venous thrombosis/PE on xarelto, anxiety, depression, and asthma who presented to the ED from infusion clinic after found to be hypoxic and complains of myalgias, shortness of breath, cough and rhinorrhea. She is admitted to Internal Medicine for further work-up and  management.       # Acute hypoxic respiratory failure  # Possible acute PE  # Possible Acute chest syndrome  Presenting with myalgias, SOB, cough and rhinorrhea. Afebrile. Noted to be hypoxic to 80s at infusion clinic. WBC 14.3 CXR with mild hazy bibasilar opacities favored to be atelectasis vs infection. Started on cefepime/vanco in ED. Exam with occasional coarse breath sounds. Has history of acute chest syndrome requiring exchange transfusions as well as previous PE while on AC. NM scan this admission with possible new acute PE as well. Most likely etiology at this time appears viral vs bacterial infection, acute chest or acute PE.   - Continue vanco/cefepime  - f/u BC x2   - Continue heparin infusions   - Appreciate Heme Consult, will reach out if worsening hypoxia or respiratory status      # Sickle cell anemia with pain crisis  Concern for acute chest as above. Did get one unit red cells this admisstion.  - Continue abx  - Hematology consult appreciated  - Start morphine PCA per pain plan:              - Continuous rate 1 mg/hr              - PCA dose 1 mg              - PCA lockout q 20 minutes              - 1 hour limit 4 mg  - Holding pta oxycontin, prn oxycodone  - Capnography, pulse oximetry  - Continue pta hydea, jadenu     Hypokalemia  Potassium low in setting of poor appetite today while feeling unwell.     Asthma  PTA on symbicort, albuterol. Has used inhaler this week with no improvement in symptoms.  - Continue pta symbicort BID  - PRN albuterol HFA inhaler or nebulizer     Hx of PE, RUE DVT - Continue heparin infusion for now, DOAC vs other heparinoid on discharge, will talk with heme  Hx of CVA with residual RUE deficits - Remote history of CVA in 2015  Anxiety/Depression - Continue pta zoloft 200 mg daily, abilify 2 mg daily           Diet: Combination Diet Regular Diet Adult    DVT Prophylaxis: heparin infusion  Lopez Catheter: Not present  Central Lines: None  Code Status: Full Code      Seen  and discussed with my attending physician,   ,who agrees with above assessment and plan.    Tati Mcclendon MS4  Gold 8      Subjective     Pt states she feels the same as yesterday. Still in overall pain and still has some trouble breathing. Is laying in bed with fatigue.     Pt denies change in appetite or bowel movements.     Nursing note was reviewed, no acute event overnight.    ROS negative except above.    Objective   Most recent vital signs:  /76 (BP Location: Right arm)   Pulse 92   Temp (!) 96.6  F (35.9  C) (Oral)   Resp 22   SpO2 94%   Temp:  [96  F (35.6  C)-98.5  F (36.9  C)] 96.6  F (35.9  C)  Pulse:  [] 92  Resp:  [20-24] 22  BP: (107-132)/(55-81) 132/76  FiO2 (%):  [3 %] 3 %  SpO2:  [90 %-97 %] 94 %  Wt Readings from Last 2 Encounters:   07/12/21 77.1 kg (170 lb)   07/08/21 77.1 kg (170 lb)       Intake/Output Summary (Last 24 hours) at 7/15/2021 1002  Last data filed at 7/15/2021 0956  Gross per 24 hour   Intake 883 ml   Output 2125 ml   Net -1242 ml       Physical exam:  General: Alert, oriented, cooperative, no apparent distress, appears nontoxic  HEENT: Sclerae anicteric. Oropharynx is clear and moist. No evidence of cranial trauma.  Lymph/Hematologic: No epitrochlear, axillary, anterior or posterior cervical, or supraclavicular lymphadenopathy is appreciated.  Cardiovascular: Regular rate and rhythm, normal S1 and S2, and no murmur noted. JVP is normal. Good peripheral pulses in wrists bilaterally. No lower extremity edema.  Respiratory: increased work of breathing on facemask, some bilateral crackles  GI: Soft, non-tender  Musculoskeletal: Normal muscle bulk and tone.  Skin: Warm and dry, no rashes.   Neurologic: Neck supple. Cranial nerves are grossly intact. CN II-XII intact,   Labs  CMP  Recent Labs   Lab 07/15/21  0812 07/14/21  1730 07/13/21  1631 07/13/21  1024 07/10/21  0339    140 136 138 140   POTASSIUM 3.6 3.8 3.3* 3.2* 3.9   CHLORIDE 111* 111* 108 111*  112*   CO2 22 22 21 20 22   ANIONGAP 6 7 7 7 6   GLC 84 76 85 106* 101*   BUN 6* 5* 6* 6* 9   CR 0.59 0.64 0.65 0.60 0.50*   GFRESTIMATED >90 >90 >90 >90 >90   GFRESTBLACK  --   --   --   --  >90   MICAH 8.3* 8.4* 8.7 8.5 8.4*   PROTTOTAL 6.9 7.3 8.1 7.9 7.6   ALBUMIN 3.5 3.7 4.3 4.1 4.0   BILITOTAL 4.5* 4.8* 6.3* 7.2* 3.6*   ALKPHOS 65 68 73 73 69   AST 89* 81* 74* 68* 73*   ALT 81* 78* 72* 65* 70*     CBC  Recent Labs   Lab 07/15/21  0812 07/14/21  1730 07/13/21  1630 07/13/21  1024   WBC 12.6* 14.0* 14.3* 15.0*   RBC 2.38* 2.46* 2.37* 2.38*   HGB 7.5* 7.6* 7.4* 7.3*   HCT 21.5* 21.9* 21.2* 21.2*   MCV 90 89 90 89   MCH 31.5 30.9 31.2 30.7   MCHC 34.9 34.7 34.9 34.4   RDW 22.2* 20.6* 22.2* 21.4*    253 274 268     INRNo lab results found in last 7 days.  Arterial Blood GasNo lab results found in last 7 days.      Imaging

## 2021-07-15 NOTE — PHARMACY-VANCOMYCIN DOSING SERVICE
Pharmacy Vancomycin Note  Date of Service July 15, 2021  Patient's  2000   21 year old, female, ABW 77.1 kg    Indication: Hospital Acquired Pneumonia  Day of Therapy: 3  Current vancomycin regimen:  1250 mg IV q12h  Current vancomycin monitoring method: AUC  Current vancomycin therapeutic monitoring goal: 400-600 mg*h/L    Current estimated CrCl = Estimated Creatinine Clearance: 150.4 mL/min (based on SCr of 0.59 mg/dL).    Creatinine for last 3 days  2021: 10:24 AM Creatinine 0.60 mg/dL;  4:31 PM Creatinine 0.65 mg/dL  2021:  5:30 PM Creatinine 0.64 mg/dL  7/15/2021:  8:12 AM Creatinine 0.59 mg/dL    Recent Vancomycin Levels (past 3 days)  7/15/2021:  9:23 AM Vancomycin 11.8 mg/L    Vancomycin IV Administrations (past 72 hours)                   vancomycin 1250 mg in 0.9% NaCl 250 mL intermittent infusion 1,250 mg (mg) 1,250 mg New Bag 07/15/21 1013     1,250 mg New Bag 21 2129     1,250 mg New Bag  0917    vancomycin (VANCOCIN) 2,000 mg in sodium chloride 0.9 % 500 mL intermittent infusion ()  Restarted 21 2250     2,000 mg New Bag  2108                Nephrotoxins and other renal medications (From now, onward)    None             Contrast Orders - past 72 hours (72h ago, onward)    Start     Dose/Rate Route Frequency Ordered Stop    21  technetium pertechnetate with albumin (Tc99m MAA) radioisotope injection 4.8-7.2 mCi      4.8-7.2 mCi Intravenous ONCE 21 22021 2217          Interpretation of levels and current regimen:  Vancomycin level is reflective of -600    Has serum creatinine changed greater than 50% in last 72 hours: No    Urine output:  good urine output    Renal Function: Stable        Plan:  1. Vancomycin therapy was discontinued by primary team shortly after this level was drawn. No further dosing at this time.    Jose Manuel Tracy IV Pharm.D Student    I have read and agree with the student's note.  Brent Adair PharmD, BCPS

## 2021-07-15 NOTE — PROGRESS NOTES
PTT ordered for 0445. Still not drawn. Hep gtt running. Lab notified. Urine red tinged. MD notified. CBC changed to STAT.

## 2021-07-15 NOTE — PLAN OF CARE
Time: 0700 - 1930    Reason for admit: Respiratory failure with hypoxia, sickle cell pain crisis.   Vitals: VSS on 4 L oxymask, afebrile.   Activity: SBA.   Neuro: A&Ox4, able to make needs known. RUE hemiparesis from previous stroke.   Mood/Behavior: Calm, cooperative.  Lines/Drains: R PIV WDL, infusing TKO. L PIV WDL, infusing heparin gtt at 1000 units/hour. L chest port infusing LR at 125 mL/hr, with morphine PCA y-sited at continuous rate of 1 mg/hr, 4 mg/hr lockout.   Cardiac: WDL.   Respiratory: ORTEGA. Expiratory wheezing on auscultation. O2 sats stable in mid-90's on 4 L oxymask.  Diet: Regular diet, tolerating well.   GI/: Voiding WDL. No BM this shift. Denies nausea.   Skin: Intact.   Pain: Pt reporting generalized pain; morphine PCA in use.     New this shift: Hb 7.5 this AM. PTT's therapeutic at 89 and 91. IV abx discontinued.     Plan: Continue to monitor respiratory status, Hb. Transitioning to PO anticoagulant this evening, stopping heparin gtt at 2000. Encourage PO intake and activity as tolerated. Follow POC.

## 2021-07-15 NOTE — PROGRESS NOTES
Hematology  Daily Progress Note   Date of Service: 07/15/2021  Patient: Jennifer Cervantes  MRN: [id number]  Admission Date: 7/13/2021  Hospital Day # 2      Assessment & Plan:   Jennifer Cervantes is a 21 year old female with HgbSS complicated by frequent pain crises (acute and chronic components), history of stroke leading to significant cognitive delays and right upper extremity hemiparesis, iron overload 2/2 chronic transfusions as secondary ppx post-CVA, anxiety/depression, asthma, She is currently on Hydrea and Jadenu with plan to add Desferal due to significant iron overload. She  was admitted on 7/13/2021 with dyspnea and cough concerning for acute chest syndrome.     Likely cause of hypoxia secondary to acute PE despite anticoagulation with rivaroxaban. Presently, concern that patient may have some resistance to anticoagulation that targets Xa such as DOACs and potentially lovenox. At this point would trial direct thrombin inhibitor such as dabigatran.      Hematology/Oncology Problem list:   # Acute hypoxic respiratory failure secondary to PE  # Sickle Cell anemia with history of acute chest syndrome  # Right innominate vein thrombosis + 3 moderate subsegmental perfusion defects (2/1/21) on rivaroxaban     Summary of Recommendations:  We can stop treatment for presumed pneumonia/acute chest syndrome as the PE is a reasonable explanation for her presentation  Low threshold to repeat chest imaging if worsening hypoxia, or fever  If febrile collect blood cultures, complete repeat chest xray, and call hematology  Patient hesitant to undergo exchange transfusion, but if change in respiratory status call hematology fellow or attending on call.  Continue pain management per pain plan  ER/Acute Care/Infusion Clinic:       Morphine 2 mg IVP/SC Q1H X 3 doses  Toradol x1 (avoid through 7/27/21 due to being on anticoagulation)  Maintenance IV fluids with LR  Other: Benadryl PO and Zofran 8 mg IV PRN itching or  nausea  Inpatient:  Opioid: Morphine 2 mg IV Q1H PRN until PCA starts  PCA plan:   PCA button dose: Morphine 1 mg  Lockout: 20 minutes  Continuous Infusion: consider 1 mg/hr  One hr max: 4 mg  Other Medications: Benadryl, Zofran  If PCA not working, she Has had success with ketamine starting at 4mg/h and advancing only to 6mg/h, as 8mg/h made her feel quite poorly.  ASA on hold  Supportive Care: Docusate, Senna  Continue heparin gtt and transition to dabigatran 150mg BID this evening. You can give the dabigatran around 8p (so she has an 8A/8P schedule) and turn off the heparin at the same time.  Please ensure adequate bowel regimen.   Encourage incentive spirometry.          Thank you for involving us in the care of this patient. We will continue to follow during the hospitalization.     Patient was seen and plan of care developed with Dr. Duncan.  Akhil Gutiérrez MD   Hematology  Fellow  Pager: [phone]  ________________    I saw and evaluated the patient and performed a separate physical exam, consistent with the one documented by Dr. Gutiérrez. I discussed the case with her and agree with the documentation as above, with the attending edits in bold/blue.        Eric Duncan MD  Hematologist  Division of Hematology, Oncology, and Transplantation  [facility]  [facility]  Pager: [phone]  ___________________________________________________    Subjective & Interval History:    No acute events noted overnight. States that she feels similar to yesterday and is still very tired. Has not tried to eat or drink yet.       Physical Exam:    Blood pressure 132/76, pulse 92, temperature (!) 96.6  F (35.9  C), temperature source Oral, resp. rate 22, SpO2 94 %, not currently breastfeeding.    ENERAL: Laying in  NAD  HEENT: AT/NC,  RESP: non labored breathing on supplemental oxygen  CARDIAC: on telemetry  SKIN: Warm and dry, no jaundice or rash on limited exam  NEURO: speech fluent, mentation  intact, nods appropriately to questions  PSYCH: fatigued , linear thought.     Labs & Studies: I personally reviewed the following studies:  ROUTINE LABS (Last four results):  CMP  Recent Labs   Lab 07/15/21  0812 07/14/21  1730 07/13/21  1631 07/13/21  1024 07/10/21  0339    140 136 138 140   POTASSIUM 3.6 3.8 3.3* 3.2* 3.9   CHLORIDE 111* 111* 108 111* 112*   CO2 22 22 21 20 22   ANIONGAP 6 7 7 7 6   GLC 84 76 85 106* 101*   BUN 6* 5* 6* 6* 9   CR 0.59 0.64 0.65 0.60 0.50*   GFRESTIMATED >90 >90 >90 >90 >90   GFRESTBLACK  --   --   --   --  >90   MICAH 8.3* 8.4* 8.7 8.5 8.4*   PROTTOTAL 6.9 7.3 8.1 7.9 7.6   ALBUMIN 3.5 3.7 4.3 4.1 4.0   BILITOTAL 4.5* 4.8* 6.3* 7.2* 3.6*   ALKPHOS 65 68 73 73 69   AST 89* 81* 74* 68* 73*   ALT 81* 78* 72* 65* 70*     CBC  Recent Labs   Lab 07/14/21  1730 07/13/21  1630 07/13/21  1024 07/12/21  0338   WBC 14.0* 14.3* 15.0* 11.8*   RBC 2.46* 2.37* 2.38* 2.55*   HGB 7.6* 7.4* 7.3* 8.2*   HCT 21.9* 21.2* 21.2* 23.0*   MCV 89 90 89 90   MCH 30.9 31.2 30.7 32.2   MCHC 34.7 34.9 34.4 35.7   RDW 20.6* 22.2* 21.4* 23.7*    274 268 292     INRNo lab results found in last 7 days.    Medications list for reference:  Current Facility-Administered Medications   Medication    0.9% sodium chloride BOLUS    acetaminophen (TYLENOL) tablet 650 mg    albuterol (PROAIR HFA/PROVENTIL HFA/VENTOLIN HFA) 108 (90 Base) MCG/ACT inhaler 2 puff    albuterol (PROVENTIL) neb solution 2.5 mg    ARIPiprazole (ABILIFY) tablet 2 mg    carbamide peroxide (DEBROX) 6.5 % otic solution 10 drop    ceFEPIme (MAXIPIME) 2 g vial to attach to  ml bag for ADULTS or 50 ml bag for PEDS    deferasirox (JADENU) tablet 1,440 mg    diclofenac (VOLTAREN) 1 % topical gel 4 g    diphenhydrAMINE (BENADRYL) capsule 25-50 mg    fluticasone-vilanterol (BREO ELLIPTA) 200-25 MCG/INH inhaler 1 puff    heparin 25,000 units in 0.45% NaCl 250 mL ANTICOAGULANT infusion    hydroxyurea (HYDREA) capsule 2,000 mg    hydrOXYzine  (ATARAX) tablet 25 mg    lactated ringers infusion    lidocaine (LMX4) cream    lidocaine 1 % 0.1-1 mL    morphine  PCA 1 mg/mL OPIOID TOLERANT    morphine (PF) injection 2-4 mg    ondansetron (ZOFRAN-ODT) ODT tab 4 mg    Or    ondansetron (ZOFRAN) injection 4 mg    [Held by provider] oxyCODONE (oxyCONTIN) 12 hr tablet 10 mg    [Held by provider] oxyCODONE (ROXICODONE) tablet 15 mg    Patient is already receiving anticoagulation with heparin, enoxaparin (LOVENOX), warfarin (COUMADIN)  or other anticoagulant medication    polyethylene glycol (MIRALAX) Packet 17 g    prochlorperazine (COMPAZINE) injection 10 mg    Or    prochlorperazine (COMPAZINE) tablet 10 mg    Or    prochlorperazine (COMPAZINE) suppository 25 mg    [Held by provider] rivaroxaban ANTICOAGULANT (XARELTO) tablet 15 mg    senna-docusate (SENOKOT-S/PERICOLACE) 8.6-50 MG per tablet 1 tablet    Or    senna-docusate (SENOKOT-S/PERICOLACE) 8.6-50 MG per tablet 2 tablet    sertraline (ZOLOFT) tablet 200 mg    sodium chloride (PF) 0.9% PF flush 3 mL    sodium chloride (PF) 0.9% PF flush 3 mL    vancomycin 1250 mg in 0.9% NaCl 250 mL intermittent infusion 1,250 mg

## 2021-07-16 LAB
ALBUMIN SERPL-MCNC: 3.3 G/DL (ref 3.4–5)
ALP SERPL-CCNC: 63 U/L (ref 40–150)
ALT SERPL W P-5'-P-CCNC: 80 U/L (ref 0–50)
ANION GAP SERPL CALCULATED.3IONS-SCNC: 8 MMOL/L (ref 3–14)
APTT PPP: 37 SECONDS (ref 22–38)
AST SERPL W P-5'-P-CCNC: 85 U/L (ref 0–45)
BILIRUB SERPL-MCNC: 2.7 MG/DL (ref 0.2–1.3)
BUN SERPL-MCNC: 3 MG/DL (ref 7–30)
CALCIUM SERPL-MCNC: 8.2 MG/DL (ref 8.5–10.1)
CHLORIDE BLD-SCNC: 110 MMOL/L (ref 94–109)
CO2 SERPL-SCNC: 24 MMOL/L (ref 20–32)
CREAT SERPL-MCNC: 0.56 MG/DL (ref 0.52–1.04)
ERYTHROCYTE [DISTWIDTH] IN BLOOD BY AUTOMATED COUNT: 23.5 % (ref 10–15)
GFR SERPL CREATININE-BSD FRML MDRD: >90 ML/MIN/1.73M2
GLUCOSE BLD-MCNC: 104 MG/DL (ref 70–99)
HCT VFR BLD AUTO: 22.4 % (ref 35–47)
HGB BLD-MCNC: 7.9 G/DL (ref 11.7–15.7)
LACTATE SERPL-SCNC: 1.3 MMOL/L (ref 0.7–2)
MCH RBC QN AUTO: 31.3 PG (ref 26.5–33)
MCHC RBC AUTO-ENTMCNC: 35.3 G/DL (ref 31.5–36.5)
MCV RBC AUTO: 89 FL (ref 78–100)
PLATELET # BLD AUTO: 321 10E3/UL (ref 150–450)
POTASSIUM BLD-SCNC: 3.2 MMOL/L (ref 3.4–5.3)
POTASSIUM BLD-SCNC: 3.3 MMOL/L (ref 3.4–5.3)
PROT SERPL-MCNC: 6.6 G/DL (ref 6.8–8.8)
RBC # BLD AUTO: 2.52 10E6/UL (ref 3.8–5.2)
RETICS # AUTO: 0.53 10E6/UL (ref 0.03–0.1)
RETICS/RBC NFR AUTO: 21 % (ref 0.5–2)
SODIUM SERPL-SCNC: 142 MMOL/L (ref 133–144)
WBC # BLD AUTO: 10.4 10E3/UL (ref 4–11)

## 2021-07-16 PROCEDURE — 82040 ASSAY OF SERUM ALBUMIN: CPT | Performed by: STUDENT IN AN ORGANIZED HEALTH CARE EDUCATION/TRAINING PROGRAM

## 2021-07-16 PROCEDURE — 250N000011 HC RX IP 250 OP 636: Performed by: PHYSICIAN ASSISTANT

## 2021-07-16 PROCEDURE — 258N000003 HC RX IP 258 OP 636: Performed by: EMERGENCY MEDICINE

## 2021-07-16 PROCEDURE — 84132 ASSAY OF SERUM POTASSIUM: CPT | Performed by: HOSPITALIST

## 2021-07-16 PROCEDURE — 250N000013 HC RX MED GY IP 250 OP 250 PS 637: Performed by: STUDENT IN AN ORGANIZED HEALTH CARE EDUCATION/TRAINING PROGRAM

## 2021-07-16 PROCEDURE — 99232 SBSQ HOSP IP/OBS MODERATE 35: CPT | Mod: GC | Performed by: PEDIATRICS

## 2021-07-16 PROCEDURE — 99233 SBSQ HOSP IP/OBS HIGH 50: CPT | Performed by: STUDENT IN AN ORGANIZED HEALTH CARE EDUCATION/TRAINING PROGRAM

## 2021-07-16 PROCEDURE — 36415 COLL VENOUS BLD VENIPUNCTURE: CPT | Performed by: PHYSICIAN ASSISTANT

## 2021-07-16 PROCEDURE — 250N000013 HC RX MED GY IP 250 OP 250 PS 637: Performed by: PHYSICIAN ASSISTANT

## 2021-07-16 PROCEDURE — 85027 COMPLETE CBC AUTOMATED: CPT | Performed by: PHYSICIAN ASSISTANT

## 2021-07-16 PROCEDURE — 258N000003 HC RX IP 258 OP 636: Performed by: PHYSICIAN ASSISTANT

## 2021-07-16 PROCEDURE — 120N000002 HC R&B MED SURG/OB UMMC

## 2021-07-16 PROCEDURE — 83605 ASSAY OF LACTIC ACID: CPT | Performed by: HOSPITALIST

## 2021-07-16 PROCEDURE — 250N000009 HC RX 250: Performed by: PHYSICIAN ASSISTANT

## 2021-07-16 PROCEDURE — 85045 AUTOMATED RETICULOCYTE COUNT: CPT | Performed by: STUDENT IN AN ORGANIZED HEALTH CARE EDUCATION/TRAINING PROGRAM

## 2021-07-16 PROCEDURE — 85730 THROMBOPLASTIN TIME PARTIAL: CPT | Performed by: STUDENT IN AN ORGANIZED HEALTH CARE EDUCATION/TRAINING PROGRAM

## 2021-07-16 PROCEDURE — 36592 COLLECT BLOOD FROM PICC: CPT | Performed by: HOSPITALIST

## 2021-07-16 RX ORDER — POTASSIUM CHLORIDE 1.5 G/1.58G
40 POWDER, FOR SOLUTION ORAL ONCE
Status: DISCONTINUED | OUTPATIENT
Start: 2021-07-16 | End: 2021-07-16 | Stop reason: ALTCHOICE

## 2021-07-16 RX ORDER — POTASSIUM CHLORIDE 750 MG/1
40 TABLET, EXTENDED RELEASE ORAL ONCE
Status: COMPLETED | OUTPATIENT
Start: 2021-07-16 | End: 2021-07-16

## 2021-07-16 RX ADMIN — HYDROXYUREA 2000 MG: 500 CAPSULE ORAL at 20:36

## 2021-07-16 RX ADMIN — SERTRALINE HYDROCHLORIDE 200 MG: 100 TABLET ORAL at 22:11

## 2021-07-16 RX ADMIN — DABIGATRAN ETEXILATE MESYLATE 150 MG: 150 CAPSULE ORAL at 20:36

## 2021-07-16 RX ADMIN — DICLOFENAC 4 G: 10 GEL TOPICAL at 11:45

## 2021-07-16 RX ADMIN — DABIGATRAN ETEXILATE MESYLATE 150 MG: 150 CAPSULE ORAL at 11:44

## 2021-07-16 RX ADMIN — Medication: at 01:18

## 2021-07-16 RX ADMIN — ALBUTEROL SULFATE 2.5 MG: 2.5 SOLUTION RESPIRATORY (INHALATION) at 04:47

## 2021-07-16 RX ADMIN — SODIUM CHLORIDE, POTASSIUM CHLORIDE, SODIUM LACTATE AND CALCIUM CHLORIDE: 600; 310; 30; 20 INJECTION, SOLUTION INTRAVENOUS at 09:34

## 2021-07-16 RX ADMIN — SODIUM CHLORIDE, POTASSIUM CHLORIDE, SODIUM LACTATE AND CALCIUM CHLORIDE: 600; 310; 30; 20 INJECTION, SOLUTION INTRAVENOUS at 00:28

## 2021-07-16 RX ADMIN — SODIUM CHLORIDE, POTASSIUM CHLORIDE, SODIUM LACTATE AND CALCIUM CHLORIDE: 600; 310; 30; 20 INJECTION, SOLUTION INTRAVENOUS at 18:58

## 2021-07-16 RX ADMIN — POTASSIUM CHLORIDE 40 MEQ: 750 TABLET, EXTENDED RELEASE ORAL at 11:42

## 2021-07-16 RX ADMIN — ARIPIPRAZOLE 2 MG: 2 TABLET ORAL at 22:12

## 2021-07-16 ASSESSMENT — ACTIVITIES OF DAILY LIVING (ADL)
ADLS_ACUITY_SCORE: 14
ADLS_ACUITY_SCORE: 13
ADLS_ACUITY_SCORE: 14
ADLS_ACUITY_SCORE: 13
DEPENDENT_IADLS:: CLEANING;COOKING;SHOPPING;TRANSPORTATION
ADLS_ACUITY_SCORE: 13
ADLS_ACUITY_SCORE: 14

## 2021-07-16 NOTE — PROGRESS NOTES
"   I, Alberto Larson, was present with the medical/ANDREAS student who participated in the service and in the documentation of the note.  I have verified the history and personally performed the physical exam and medical decision making.  I agree with the assessment and plan of care as documented in the note.       I personally reviewed vital signs, medications, labs and imaging.    She feels about the same today. Breathing feels \"ok.\"     Gen: awake and alert, appears comfortable  HEENT: NCAT, MMM  CV: RRR, S1/S2, extremities warm and well perfused, no lower extremity edema  Pulm: work of breathing improved from day prior, mild increase in work of breathing  Skin: warm, no jaundice  Neuro: Aox3, speech normal, moves all extremities symmetrically and equally  Psych: mood is better, not interested in talking much though     AHRF - likely from PE, on dabigatran now. C/f acute chest and PNA is low and abx stopped 7/15.  Sickle cell pain crisis - continue PCA.   Hx CVA - continue to hold ASA while on OAC. Possibly restart on discharge but defer to hematology.   Hypokalemia, replace and daily BMP.     Alberto Larson MD   Date I saw the patient 7/16/2021  ------------------------------------------------------      INTERNAL MEDICINE   DAILY PROGRESS NOTE  Gold 8      Jennifer Cervantes ([id number])   Admission Date: 7/13/2021  Date of Service: 07/16/2021      Changes Today     - Continue dabigatran  - Continue PCA    Assessment & Plan   Jennifer Cervantes is a 21 year old female admitted on 7/13/2021. She has a past medical history significant for sickle cell anemia, history of stroke with right upper extremity hemiparesis, venous thrombosis/PE on xarelto, anxiety, depression, and asthma who presented to the ED from infusion clinic after found to be hypoxic and complains of myalgias, shortness of breath, cough and rhinorrhea. She is admitted to Internal Medicine for further work-up and management.       # Acute hypoxic respiratory failure  # " Possible acute PE  # Possible Acute chest syndrome  Presenting with myalgias, SOB, cough and rhinorrhea. Afebrile. Noted to be hypoxic to 80s at infusion clinic. WBC 14.3 CXR with mild hazy bibasilar opacities favored to be atelectasis vs infection. Started on cefepime/vanco in ED. Exam with occasional coarse breath sounds. Has history of acute chest syndrome requiring exchange transfusions as well as previous PE while on AC. NM scan this admission with possible new acute PE as well. Most likely etiology from PE.   - Continue dabigatran   - Heparin discontinued  - Vanc and Zosyn discontinued  - Appreciate Heme Consult, will reach out if worsening hypoxia or respiratory status      # Sickle cell anemia with pain crisis  Concern for acute chest as above. Did get one unit red cells this admisstion.  - Hematology consult appreciated  - Cont, morphine PCA per pain plan:              - Continuous rate 1 mg/hr              - PCA dose 1 mg              - PCA lockout q 20 minutes              - 1 hour limit 4 mg  - Holding pta oxycontin, prn oxycodone  - Capnography, pulse oximetry  - Continue pta hydea, jadenu     Hypokalemia  Potassium low in setting of poor appetite today while feeling unwell.  - K replacement protocol     Asthma  PTA on symbicort, albuterol. Has used inhaler this week with no improvement in symptoms.  - Continue pta symbicort BID  - PRN albuterol HFA inhaler or nebulizer     Hx of PE, RUE DVT - Continue heparin infusion for now, DOAC vs other heparinoid on discharge, will talk with heme  Hx of CVA with residual RUE deficits - Remote history of CVA in 2015  Anxiety/Depression - Continue pta zoloft 200 mg daily, abilify 2 mg daily        Diet: Combination Diet Regular Diet Adult    DVT Prophylaxis: heparin infusion  Lopez Catheter: Not present  Central Lines: None  Code Status: Full Code      Seen and discussed with my attending physician,   ,who agrees with above assessment and plan.    Tati  Hakan MS4  Gold 8      Subjective     Pt states she feels the same as yesterday. But breathing might have improved some. Her chief complaint is feeling very tired.  Pt denies change in appetite or bowel movements.     Nursing note was reviewed, no acute event overnight.    ROS negative except above.    Objective   Most recent vital signs:  /73 (BP Location: Left arm)   Pulse 94   Temp 97  F (36.1  C) (Oral)   Resp 18   SpO2 94%   Temp:  [96.5  F (35.8  C)-99.1  F (37.3  C)] 97  F (36.1  C)  Pulse:  [] 94  Resp:  [16-18] 18  BP: (106-133)/(56-77) 133/73  SpO2:  [92 %-97 %] 94 %  Wt Readings from Last 2 Encounters:   07/12/21 77.1 kg (170 lb)   07/08/21 77.1 kg (170 lb)       Intake/Output Summary (Last 24 hours) at 7/15/2021 1002  Last data filed at 7/15/2021 0956  Gross per 24 hour   Intake 883 ml   Output 2125 ml   Net -1242 ml       Physical exam:  General: Alert, oriented, cooperative, no apparent distress, appears nontoxic  HEENT: Sclerae anicteric. Oropharynx is clear and moist. No evidence of cranial trauma.  Lymph/Hematologic: No epitrochlear, axillary, anterior or posterior cervical, or supraclavicular lymphadenopathy is appreciated.  Cardiovascular: Regular rate and rhythm, normal S1 and S2, and no murmur noted. JVP is normal. Good peripheral pulses in wrists bilaterally. No lower extremity edema.  Respiratory: increased work of breathing on facemask, some bilateral crackles  GI: Soft, non-tender  Musculoskeletal: Normal muscle bulk and tone.  Skin: Warm and dry, no rashes.   Neurologic: Neck supple. Cranial nerves are grossly intact. CN II-XII intact,   Labs  CMP  Recent Labs   Lab 07/16/21  0712 07/16/21  0606 07/15/21  0812 07/14/21  1730 07/13/21  1631 07/10/21  0339   NA  --  142 139 140 136 140   POTASSIUM 3.3* 3.2* 3.6 3.8 3.3* 3.9   CHLORIDE  --  110* 111* 111* 108 112*   CO2  --  24 22 22 21 22   ANIONGAP  --  8 6 7 7 6   GLC  --  104* 84 76 85 101*   BUN  --  3* 6* 5* 6* 9   CR   --  0.56 0.59 0.64 0.65 0.50*   GFRESTIMATED  --  >90 >90 >90 >90 >90   GFRESTBLACK  --   --   --   --   --  >90   MICAH  --  8.2* 8.3* 8.4* 8.7 8.4*   PROTTOTAL  --  6.6* 6.9 7.3 8.1 7.6   ALBUMIN  --  3.3* 3.5 3.7 4.3 4.0   BILITOTAL  --  2.7* 4.5* 4.8* 6.3* 3.6*   ALKPHOS  --  63 65 68 73 69   AST  --  85* 89* 81* 74* 73*   ALT  --  80* 81* 78* 72* 70*     CBC  Recent Labs   Lab 07/16/21  0606 07/15/21  0812 07/14/21  1730 07/13/21  1630   WBC 10.4 12.6* 14.0* 14.3*   RBC 2.52* 2.38* 2.46* 2.37*   HGB 7.9* 7.5* 7.6* 7.4*   HCT 22.4* 21.5* 21.9* 21.2*   MCV 89 90 89 90   MCH 31.3 31.5 30.9 31.2   MCHC 35.3 34.9 34.7 34.9   RDW 23.5* 22.2* 20.6* 22.2*    295 253 274     INRNo lab results found in last 7 days.  Arterial Blood GasNo lab results found in last 7 days.      Imaging

## 2021-07-16 NOTE — PROGRESS NOTES
Social Work Follow-Up Encounter Visit  Oncology Clinic    Data/Intervention:  Patient Name:  Jennifer Cervantes  /Age:  2000 (21 year old)    Reason for Follow-Up:  Transportation    Collaborated With:    -patient      Assessment:  SW arranged transportation through patient's insurance transportation benefits for an infusion appointment today. SW called patient and provided transportation arrangement information. SW confirmed with clinic transpotration was arranged.      Plan:  Previously provided patient/family with writer's contact information and availability.   Sw will remain available as needed for on going supports.     Corry GONZALEZ, LGSW  - Oncology  Phone : [phone]  Pager: [phone]

## 2021-07-16 NOTE — PLAN OF CARE
/76 (BP Location: Left arm)   Pulse 92   Temp (!) 95.4  F (35.2  C) (Oral)   Resp 18   SpO2 96%     Time 9295-7955      Reason for admission: Hb-SS disease with crisis, Acute respiratory failure with hypoxia, Pulmonary embolism   Vitals: VSS on 2LPM via oxymask  Activity: Up Ind/SBA  Pain: PCA continues @ 1mg/hr w/ 1mg q 20min for Max of 4mg/hr  Neuro: A&Ox4, speech logical  Mood/Behavior: calm, cooperative  Cardiac: WNL  Respiratory: Wheezing noted, ORTEGA  GI/: No BM, voiding without difficulty  Diet:Regular   Lines: PIV, port  Skin: CDI  Labs/imaging: Potassium 3.2      New changes this shift: Potassium replaced per protocol, recheck in a.m. Pt slept majority of shift. Pt did get up to walk to nutrition room to heat up food. R arm swollen, MD aware~decreased IVF to 75ml/hr, elevated extremity.       Plan: Continue to monitor and follow POC.

## 2021-07-16 NOTE — PROGRESS NOTES
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 She  was admitted on 7/13/2021 with dyspnea and cough concerning for acute chest syndrome.     Likely cause of hypoxia secondary to acute PE despite anticoagulation with rivaroxaban. Presently, concern that patient may have some resistance to anticoagulation that targets Xa such as DOACs and potentially lovenox. At this point would trial direct thrombin inhibitor such as dabigatran.      Hematology/Oncology Problem list:   # Acute hypoxic respiratory failure secondary to PE  # Sickle Cell anemia with history of acute chest syndrome  # Right innominate vein thrombosis + 3 moderate subsegmental perfusion defects (2/1/21) on rivaroxaban     Summary of Recommendations:    Low threshold to repeat chest imaging if worsening hypoxia, or fever    If febrile collect blood cultures, complete repeat chest xray, and call hematology    Continue pain management per pain plan  1. ER/Acute Care/Infusion Clinic:       1. Morphine 2 mg IVP/SC Q1H X 3 doses  2. Toradol x1 (avoid through 7/27/21 due to being on anticoagulation)  3. Maintenance IV fluids with LR  4. Other: Benadryl PO and Zofran 8 mg IV PRN itching or nausea  2. Inpatient:  1. Opioid: Morphine 2 mg IV Q1H PRN until PCA starts  2. PCA plan:     PCA button dose: Morphine 1 mg    Lockout: 20 minutes    Continuous Infusion: consider 1 mg/hr    One hr max: 4 mg  3. Other Medications: Benadryl,  Zofran  4. If PCA not working, she Has had success with ketamine starting at 4mg/h and advancing only to 6mg/h, as 8mg/h made her feel quite poorly.  5. ASA on hold (likely restart at discharge)  6. Supportive Care: Docusate, Senna    Continue dabigatran 150mg BID this evening.     Please ensure adequate bowel regimen.     Encourage incentive spirometry.          Thank you for involving us in the care of this patient. We will continue to follow during the hospitalization.     Patient was seen and plan of care developed with Dr. Duncan.  Akhil Gutiérrez MD   Hematology  Fellow  Pager: [phone]  _____________________________    Subjective & Interval History:    No acute events noted overnight. Continues to feel similar to yesterday. Has been out of bed to use commode.       Physical Exam:    Blood pressure 133/73, pulse 94, temperature 97  F (36.1  C), temperature source Oral, resp. rate 18, SpO2 94 %, not currently breastfeeding.    ENERAL: Laying in  NAD  HEENT: AT/NC,  RESP: non labored breathing on venti mask   CARDIAC: on telemetry  SKIN: Warm and dry, no jaundice or rash on limited exam  NEURO: speech fluent, mentation intact,  PSYCH: awake , linear thought.     Labs & Studies: I personally reviewed the following studies:  ROUTINE LABS (Last four results):  CMP  Recent Labs   Lab 07/16/21  0712 07/16/21  0606 07/15/21  0812 07/14/21  1730 07/13/21  1631 07/10/21  0339   NA  --  142 139 140 136 140   POTASSIUM 3.3* 3.2* 3.6 3.8 3.3* 3.9   CHLORIDE  --  110* 111* 111* 108 112*   CO2  --  24 22 22 21 22   ANIONGAP  --  8 6 7 7 6   GLC  --  104* 84 76 85 101*   BUN  --  3* 6* 5* 6* 9   CR  --  0.56 0.59 0.64 0.65 0.50*   GFRESTIMATED  --  >90 >90 >90 >90 >90   GFRESTBLACK  --   --   --   --   --  >90   MICAH  --  8.2* 8.3* 8.4* 8.7 8.4*   PROTTOTAL  --  6.6* 6.9 7.3 8.1 7.6   ALBUMIN  --  3.3* 3.5 3.7 4.3 4.0   BILITOTAL  --  2.7* 4.5* 4.8* 6.3* 3.6*   ALKPHOS  --  63 65 68 73 69   AST  --  85* 89* 81* 74* 73*   ALT  --  80*  81* 78* 72* 70*     CBC  Recent Labs   Lab 07/16/21  0606 07/15/21  0812 07/14/21  1730 07/13/21  1630   WBC 10.4 12.6* 14.0* 14.3*   RBC 2.52* 2.38* 2.46* 2.37*   HGB 7.9* 7.5* 7.6* 7.4*   HCT 22.4* 21.5* 21.9* 21.2*   MCV 89 90 89 90   MCH 31.3 31.5 30.9 31.2   MCHC 35.3 34.9 34.7 34.9   RDW 23.5* 22.2* 20.6* 22.2*    295 253 274     INRNo lab results found in last 7 days.    Medications list for reference:  Current Facility-Administered Medications   Medication     0.9% sodium chloride BOLUS     acetaminophen (TYLENOL) tablet 650 mg     albuterol (PROAIR HFA/PROVENTIL HFA/VENTOLIN HFA) 108 (90 Base) MCG/ACT inhaler 2 puff     albuterol (PROVENTIL) neb solution 2.5 mg     ARIPiprazole (ABILIFY) tablet 2 mg     carbamide peroxide (DEBROX) 6.5 % otic solution 10 drop     dabigatran ANTICOAGULANT (PRADAXA) capsule 150 mg     deferasirox (JADENU) tablet 1,440 mg     diclofenac (VOLTAREN) 1 % topical gel 4 g     diphenhydrAMINE (BENADRYL) capsule 25-50 mg     fluticasone-vilanterol (BREO ELLIPTA) 200-25 MCG/INH inhaler 1 puff     hydroxyurea (HYDREA) capsule 2,000 mg     hydrOXYzine (ATARAX) tablet 25 mg     lactated ringers infusion     lidocaine (LMX4) cream     lidocaine 1 % 0.1-1 mL     morphine  PCA 1 mg/mL OPIOID TOLERANT     morphine (PF) injection 2-4 mg     ondansetron (ZOFRAN-ODT) ODT tab 4 mg    Or     ondansetron (ZOFRAN) injection 4 mg     [Held by provider] oxyCODONE (oxyCONTIN) 12 hr tablet 10 mg     [Held by provider] oxyCODONE (ROXICODONE) tablet 15 mg     Patient is already receiving anticoagulation with heparin, enoxaparin (LOVENOX), warfarin (COUMADIN)  or other anticoagulant medication     polyethylene glycol (MIRALAX) Packet 17 g     potassium chloride (KLOR-CON) Packet 40 mEq     potassium chloride ER (KLOR-CON M) CR tablet 40 mEq     prochlorperazine (COMPAZINE) injection 10 mg    Or     prochlorperazine (COMPAZINE) tablet 10 mg    Or     prochlorperazine (COMPAZINE) suppository 25 mg      senna-docusate (SENOKOT-S/PERICOLACE) 8.6-50 MG per tablet 1 tablet    Or     senna-docusate (SENOKOT-S/PERICOLACE) 8.6-50 MG per tablet 2 tablet     sertraline (ZOLOFT) tablet 200 mg     sodium chloride (PF) 0.9% PF flush 3 mL     sodium chloride (PF) 0.9% PF flush 3 mL

## 2021-07-16 NOTE — CONSULTS
Care Management Initial Consult      General Information  Assessment completed with: VM-chart review  Type of CM/SW Visit: Chart Assessment  Primary Care Provider verified and updated as needed:     Readmission within the last 30 days: no previous admission in last 30 days   Reason for Consult: discharge planning (elevated risk for readmission)  Advance Care Planning:  No scanned documents       Communication Assessment  Patient's communication style:   spoken language (English or Bilingual)    Hearing Difficulty or Deaf: no   Wear Glasses or Blind: yes      Cognitive  Cognitive/Neuro/Behavioral: WDL                        Living Environment:   People in home: parent(s), sibling(s)     Current living Arrangements: house      Able to return to prior arrangements: yes       Family/Social Support:  Care provided by: self  Provides care for: no one  Marital Status: Single  Description of Support System:  Sibling(s), Parent(s)               Current Resources:   Patient receiving home care services: No  Community Resources: PCA (lives with her mother who is pt PCA and ILS worker)  Equipment currently used at home: none  Supplies currently used at home: None       Lifestyle & Psychosocial Needs:  Social Determinants of Health     Tobacco Use: Low Risk      Smoking Tobacco Use: Never Smoker     Smokeless Tobacco Use: Never Used   Alcohol Use:      Frequency of Alcohol Consumption:      Average Number of Drinks:      Frequency of Binge Drinking:    Financial Resource Strain:      Difficulty of Paying Living Expenses:    Food Insecurity:      Worried About Running Out of Food in the Last Year:      Ran Out of Food in the Last Year:    Transportation Needs:      Lack of Transportation (Medical):      Lack of Transportation (Non-Medical):    Physical Activity:      Days of Exercise per Week:      Minutes of Exercise per Session:    Stress:      Feeling of Stress :    Social Connections:      Frequency of Communication with  Friends and Family:      Frequency of Social Gatherings with Friends and Family:      Attends [employer] Services:      Active Member of Clubs or Organizations:      Attends Club or Organization Meetings:      Marital Status:    Intimate Partner Violence:      Fear of Current or Ex-Partner:      Emotionally Abused:      Physically Abused:      Sexually Abused:    Depression: Not at risk     PHQ-2 Score: 0   Housing Stability:      Unable to Pay for Housing in the Last Year:      Number of Places Lived in the Last Year:      Unstable Housing in the Last Year:        Functional Status:  Prior to admission patient needed assistance:   Dependent IADLs:: Cleaning, Cooking, Shopping, Transportation           Jamaica Arita RN, BSN, PHN  Care Coordinator  [facility]  Direct phone: [phone]  Pager: [phone]    To contact the on-call Weekend Care Coordination Team please page [phone]

## 2021-07-16 NOTE — PLAN OF CARE
Time: 9170-2917     Reason for admission: PE, AHRF     A&Ox4, lethargic, but easily awoken. VSS on 3-4L oxymask. Pt triggered sepsis. LA 1.3. Pt c/o SOB. Admin prn neb. Pt c/o generalized pain. Morphine PCA pump in use. Pt denies nausea. RUE hemiparesis. LR @ 125. Hep gtt stopped. Started on dabigatran. Some wheezing noted. Ear fullness in R ear. Admin ear drops.     Plan: Continue pain plan. Monitor hematology labs. Will continue to monitor.

## 2021-07-16 NOTE — PROGRESS NOTES
Wasted 16.4mL of morphine from cartridge with Ivana DIAZ. Unable to waste in xis.  Pharmacy informed.

## 2021-07-17 LAB
ALBUMIN SERPL-MCNC: 3.4 G/DL (ref 3.4–5)
ALP SERPL-CCNC: 62 U/L (ref 40–150)
ALT SERPL W P-5'-P-CCNC: 70 U/L (ref 0–50)
ANION GAP SERPL CALCULATED.3IONS-SCNC: 5 MMOL/L (ref 3–14)
AST SERPL W P-5'-P-CCNC: 77 U/L (ref 0–45)
BILIRUB SERPL-MCNC: 2.6 MG/DL (ref 0.2–1.3)
BUN SERPL-MCNC: 2 MG/DL (ref 7–30)
CALCIUM SERPL-MCNC: 8.6 MG/DL (ref 8.5–10.1)
CHLORIDE BLD-SCNC: 109 MMOL/L (ref 94–109)
CO2 SERPL-SCNC: 26 MMOL/L (ref 20–32)
CREAT SERPL-MCNC: 0.5 MG/DL (ref 0.52–1.04)
GFR SERPL CREATININE-BSD FRML MDRD: >90 ML/MIN/1.73M2
GLUCOSE BLD-MCNC: 81 MG/DL (ref 70–99)
POTASSIUM BLD-SCNC: 3.6 MMOL/L (ref 3.4–5.3)
PROT SERPL-MCNC: 6.9 G/DL (ref 6.8–8.8)
RETICS # AUTO: 0.48 10E6/UL (ref 0.03–0.1)
RETICS/RBC NFR AUTO: 19 % (ref 0.5–2)
SODIUM SERPL-SCNC: 140 MMOL/L (ref 133–144)

## 2021-07-17 PROCEDURE — 80053 COMPREHEN METABOLIC PANEL: CPT | Performed by: STUDENT IN AN ORGANIZED HEALTH CARE EDUCATION/TRAINING PROGRAM

## 2021-07-17 PROCEDURE — 120N000002 HC R&B MED SURG/OB UMMC

## 2021-07-17 PROCEDURE — 250N000013 HC RX MED GY IP 250 OP 250 PS 637: Performed by: STUDENT IN AN ORGANIZED HEALTH CARE EDUCATION/TRAINING PROGRAM

## 2021-07-17 PROCEDURE — 258N000003 HC RX IP 258 OP 636: Performed by: PHYSICIAN ASSISTANT

## 2021-07-17 PROCEDURE — 99232 SBSQ HOSP IP/OBS MODERATE 35: CPT | Mod: GC | Performed by: PEDIATRICS

## 2021-07-17 PROCEDURE — 250N000009 HC RX 250: Performed by: PHYSICIAN ASSISTANT

## 2021-07-17 PROCEDURE — 36592 COLLECT BLOOD FROM PICC: CPT | Performed by: STUDENT IN AN ORGANIZED HEALTH CARE EDUCATION/TRAINING PROGRAM

## 2021-07-17 PROCEDURE — 250N000013 HC RX MED GY IP 250 OP 250 PS 637: Performed by: PHYSICIAN ASSISTANT

## 2021-07-17 PROCEDURE — 99233 SBSQ HOSP IP/OBS HIGH 50: CPT | Performed by: STUDENT IN AN ORGANIZED HEALTH CARE EDUCATION/TRAINING PROGRAM

## 2021-07-17 PROCEDURE — 85045 AUTOMATED RETICULOCYTE COUNT: CPT | Performed by: STUDENT IN AN ORGANIZED HEALTH CARE EDUCATION/TRAINING PROGRAM

## 2021-07-17 RX ADMIN — ALBUTEROL SULFATE 2.5 MG: 2.5 SOLUTION RESPIRATORY (INHALATION) at 08:55

## 2021-07-17 RX ADMIN — DABIGATRAN ETEXILATE MESYLATE 150 MG: 150 CAPSULE ORAL at 22:03

## 2021-07-17 RX ADMIN — ARIPIPRAZOLE 2 MG: 2 TABLET ORAL at 22:03

## 2021-07-17 RX ADMIN — FLUTICASONE FUROATE AND VILANTEROL TRIFENATATE 1 PUFF: 200; 25 POWDER RESPIRATORY (INHALATION) at 08:58

## 2021-07-17 RX ADMIN — HYDROXYUREA 2000 MG: 500 CAPSULE ORAL at 22:03

## 2021-07-17 RX ADMIN — SERTRALINE HYDROCHLORIDE 200 MG: 100 TABLET ORAL at 22:03

## 2021-07-17 RX ADMIN — SODIUM CHLORIDE, POTASSIUM CHLORIDE, SODIUM LACTATE AND CALCIUM CHLORIDE: 600; 310; 30; 20 INJECTION, SOLUTION INTRAVENOUS at 19:26

## 2021-07-17 RX ADMIN — DABIGATRAN ETEXILATE MESYLATE 150 MG: 150 CAPSULE ORAL at 08:58

## 2021-07-17 ASSESSMENT — ACTIVITIES OF DAILY LIVING (ADL)
ADLS_ACUITY_SCORE: 13

## 2021-07-17 NOTE — PLAN OF CARE
Time:  1900-0730    Reason for admission:  SOB, PE, Hypoxia  Activity:  SBA  Pain:  C/o generalized pain throughout controlled with PCA pump  Neuro:  AOx4, ronna to make needs known. Pleasant and cooperative  Cardiac:  WDL  Respiratory: SOB and ORTEGA, stable on 2L oxymask. Some mild wheezing noted at bases of lungs.   GI/:  Voiding w/o difficulty adequate amounts in BSC. Tiffanie urine. LBM 7/15.  Diet:  Reg diet. Tolerating well with good appetite.   Lines:  R arm PIV WDL. L arm PIV WDL. L chest Port infusing LR at 75ml/hr and PCA morphine 1mg/hr w/ 1mg pushes.   Skin:  Remains intact w/o issues.   Labs/Imaging:  Potassium replaced on 7/16, recheck this AM.     New changes this shift:  Pt arm remains swollen, continue to elevate and monitor for decrease swelling.     Plan:  Continue with PCA pump and follow POC.

## 2021-07-17 NOTE — PROGRESS NOTES
"   I, Alberto Larson, was present with the medical/ANDREAS student who participated in the service and in the documentation of the note.  I have verified the history and personally performed the physical exam and medical decision making.  I agree with the assessment and plan of care as documented in the note.       I personally reviewed vital signs, medications, labs and imaging.     She feels about the same today. Breathing feels \"ok.\" Has been up and out of bed. Appetite not great yet. Asked for PCA dose to be increased due to poor pain control.     Gen: awake and alert, appears comfortable  HEENT: NCAT, MMM  CV: RRR, S1/S2, extremities warm and well perfused, no lower extremity edema  Pulm: work of breathing improved from day prior, mild increase in work of breathing  Skin: warm, no jaundice  Neuro: Aox3, speech normal, moves all extremities symmetrically and equally  Psych: mood is better, not interested in talking much though     AHRF - likely from PE, on dabigatran now. C/f acute chest and PNA is low and abx stopped 7/15.  Sickle cell pain crisis - continue PCA, will wean continuous rate and increase bolus to keep hourly goal same at 4 mg /h and hopefully she can space out prns. Basal 0.7 mg, prn 1.1 mg q20 min lockout -> total per hour is still 4 mg.   Hx CVA - continue to hold ASA while on OAC. Possibly restart on discharge but defer to hematology.   Hypokalemia, replace and trend with daily BMP.     Alberto Larson MD   Date I saw the patient 7/17/2021  ------------------------------------------------------        INTERNAL MEDICINE   DAILY PROGRESS NOTE  Gold 8      Jennifer Cervantes ([id number])   Admission Date: 7/13/2021  Date of Service: 07/17/2021      Changes Today     - Continue dabigatran  - Adjust PCA (see below)    Assessment & Plan   Jennifer Cervantes is a 21 year old female admitted on 7/13/2021. She has a past medical history significant for sickle cell anemia, history of stroke with right upper extremity " hemiparesis, venous thrombosis/PE on xarelto, anxiety, depression, and asthma who presented to the ED from infusion clinic after found to be hypoxic and complains of myalgias, shortness of breath, cough and rhinorrhea. She is admitted to Internal Medicine for further work-up and management of acute new PE/       # Acute hypoxic respiratory failure  # Possible acute PE  # Possible Acute chest syndrome  Presenting with myalgias, SOB, cough and rhinorrhea. Afebrile. Noted to be hypoxic to 80s at infusion clinic. WBC 14.3 CXR with mild hazy bibasilar opacities favored to be atelectasis vs infection. Started on cefepime/vanco in ED. Exam with occasional coarse breath sounds. Has history of acute chest syndrome requiring exchange transfusions as well as previous PE while on AC. NM scan this admission with new acute PE as well. Most likely etiology from PE.   - Continue dabigatran   - Heparin discontinued  - Vanc and Zosyn discontinued  - Appreciate Heme Consult, will reach out if worsening hypoxia or respiratory status      # Sickle cell anemia with pain crisis  Concern for acute chest as above. Did get one unit red cells this admisstion. Pain is the same and pt would like PCA increase  - Hematology consult appreciated  - Cont, morphine PCA per pain plan:              - Continuous rate .0.8 mg/hr              - PCA dose 1.2 mg              - PCA lockout q 20 minutes              - 1 hour limit 4 mg  - Holding pta oxycontin, prn oxycodone  - Capnography, pulse oximetry  - Continue pta hydea, jadenu     Hypokalemia  Potassium low in setting of poor appetite today while feeling unwell.  - K replacement protocol     Asthma  PTA on symbicort, albuterol. Has used inhaler this week with no improvement in symptoms.  - Continue pta symbicort BID  - PRN albuterol HFA inhaler or nebulizer     Hx of PE, RUE DVT - Continue heparin infusion for now, DOAC vs other heparinoid on discharge, will talk with heme  Hx of CVA with residual RUE  deficits - Remote history of CVA in 2015  Anxiety/Depression - Continue pta zoloft 200 mg daily, abilify 2 mg daily        Diet: Combination Diet Regular Diet Adult    DVT Prophylaxis: heparin infusion  Lopez Catheter: Not present  Central Lines: None  Code Status: Full Code      Seen and discussed with my attending physician,   ,who agrees with above assessment and plan.    Tati Mcclendon MS4  Gold 8      Subjective     Pt states she feels the same as yesterday. PT still feels she is in a lot of pain and would like her PCA pump increased.     Nursing note was reviewed, no acute event overnight.     ROS negative except above.    Objective   Most recent vital signs:  /69 (BP Location: Left arm)   Pulse 92   Temp 98.2  F (36.8  C) (Oral)   Resp 20   SpO2 97%   Temp:  [95.4  F (35.2  C)-98.2  F (36.8  C)] 98.2  F (36.8  C)  Pulse:  [92-99] 92  Resp:  [18-20] 20  BP: (120-133)/(69-78) 120/69  SpO2:  [84 %-97 %] 97 %  Wt Readings from Last 2 Encounters:   07/12/21 77.1 kg (170 lb)   07/08/21 77.1 kg (170 lb)       Intake/Output Summary (Last 24 hours) at 7/15/2021 1002  Last data filed at 7/15/2021 0956  Gross per 24 hour   Intake 883 ml   Output 2125 ml   Net -1242 ml       Physical exam:  General: Alert, oriented, cooperative, no apparent distress, appears nontoxic  HEENT: Sclerae anicteric. Oropharynx is clear and moist. No evidence of cranial trauma.  Lymph/Hematologic: No epitrochlear, axillary, anterior or posterior cervical, or supraclavicular lymphadenopathy is appreciated.  Cardiovascular: Regular rate and rhythm, normal S1 and S2, and no murmur noted. JVP is normal. Good peripheral pulses in wrists bilaterally. No lower extremity edema.  Respiratory: increased work of breathing on facemask, some bilateral crackles (improved)  GI: Soft, non-tender  Musculoskeletal: Normal muscle bulk and tone.  Skin: Warm and dry, no rashes.   Neurologic: Neck supple. Cranial nerves are grossly intact. CN II-XII  intact,   Labs  CMP  Recent Labs   Lab 07/16/21  0712 07/16/21  0606 07/15/21  0812 07/14/21  1730 07/13/21  1631   NA  --  142 139 140 136   POTASSIUM 3.3* 3.2* 3.6 3.8 3.3*   CHLORIDE  --  110* 111* 111* 108   CO2  --  24 22 22 21   ANIONGAP  --  8 6 7 7   GLC  --  104* 84 76 85   BUN  --  3* 6* 5* 6*   CR  --  0.56 0.59 0.64 0.65   GFRESTIMATED  --  >90 >90 >90 >90   MICAH  --  8.2* 8.3* 8.4* 8.7   PROTTOTAL  --  6.6* 6.9 7.3 8.1   ALBUMIN  --  3.3* 3.5 3.7 4.3   BILITOTAL  --  2.7* 4.5* 4.8* 6.3*   ALKPHOS  --  63 65 68 73   AST  --  85* 89* 81* 74*   ALT  --  80* 81* 78* 72*     CBC  Recent Labs   Lab 07/16/21  0606 07/15/21  0812 07/14/21 1730 07/13/21  1630   WBC 10.4 12.6* 14.0* 14.3*   RBC 2.52* 2.38* 2.46* 2.37*   HGB 7.9* 7.5* 7.6* 7.4*   HCT 22.4* 21.5* 21.9* 21.2*   MCV 89 90 89 90   MCH 31.3 31.5 30.9 31.2   MCHC 35.3 34.9 34.7 34.9   RDW 23.5* 22.2* 20.6* 22.2*    295 253 274     INRNo lab results found in last 7 days.  Arterial Blood GasNo lab results found in last 7 days.      Imaging

## 2021-07-17 NOTE — PLAN OF CARE
/71 (BP Location: Left arm)   Pulse 98   Temp 98.7  F (37.1  C) (Oral)   Resp 22   SpO2 90%     Time 2935-1308        Reason for admission: Hb-SS disease with crisis, Acute respiratory failure with hypoxia, Pulmonary embolism   Vitals: VSS on RA-2LPM via oxymask  Activity: Up Ind/SBA  Pain: PCA dose changed to 0.7mg/hr w/ 1.1mg q 20min for Max of 4mg/hr  Neuro: A&Ox4, speech logical  Mood/Behavior: calm, flat affect  Cardiac: WNL  Respiratory: Wheezing noted, ORTEGA  GI/: No BM, voiding without difficulty  Diet:Regular   Lines: PIV, port  Skin: CDI  Labs/imaging: Potassium 3.6      New changes this shift:  Pt slept majority of shift. R arm swollen, denies pain in extremity.       Plan: Continue to monitor and follow POC

## 2021-07-17 NOTE — PROGRESS NOTES
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 She  was admitted on 7/13/2021 with dyspnea and cough concerning for acute chest syndrome.     Likely cause of hypoxia secondary to acute PE despite anticoagulation with rivaroxaban. Presently, concern that patient may have some resistance to anticoagulation that targets Xa such as DOACs and potentially lovenox. At this point would trial direct thrombin inhibitor such as dabigatran.      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 Inpatient:  1. Opioid: Morphine 2 mg IV Q1H PRN until PCA starts  2. PCA plan:     PCA button dose: Morphine 1.2 mg    Lockout: 20 minutes    Continuous Infusion: consider 0.8 mg/hr    One hr max: 4 mg  3. Other Medications: Benadryl, Zofran  4. If PCA not working, she Has had success with ketamine starting at 4mg/h and advancing only to 6mg/h, as 8mg/h made her feel quite poorly.  5. ASA on hold (likely restart at discharge)  6. Supportive Care: Docusate, Senna    Continue  dabigatran 150mg BID this evening.     Please ensure adequate bowel regimen.     Encourage incentive spirometry.          Thank you for involving us in the care of this patient. We will continue to follow during the hospitalization.     Patient was seen and plan of care developed with Dr. Duncan.  Akhil Gutiérrez MD   Hematology  Fellow  Pager: [phone]  _____________________________    Subjective & Interval History:    No acute events noted overnight.  Reports that she does not have adequate pain control, currently but says that she will make the best she is being offered.  She endorses more appetite today than prior.  Continues to have cough which she says is nonproductive.      Physical Exam:    Blood pressure 120/69, pulse 92, temperature 98.2  F (36.8  C), temperature source Oral, resp. rate 20, SpO2 97 %, not currently breastfeeding.    ENERAL: Laying in  NAD  HEENT: AT/NC,  RESP: non labored breathing on venti mask   CARDIAC: on telemetry  SKIN: Warm and dry, no jaundice or rash on limited exam  NEURO: speech fluent, mentation intact,  PSYCH: awake , linear thought, intermittently agitated    Labs & Studies: I personally reviewed the following studies:  ROUTINE LABS (Last four results):  CMP  Recent Labs   Lab 07/17/21  0839 07/16/21  0712 07/16/21  0606 07/15/21  0812 07/14/21  1730     --  142 139 140   POTASSIUM 3.6 3.3* 3.2* 3.6 3.8   CHLORIDE 109  --  110* 111* 111*   CO2 26  --  24 22 22   ANIONGAP 5  --  8 6 7   GLC 81  --  104* 84 76   BUN 2*  --  3* 6* 5*   CR 0.50*  --  0.56 0.59 0.64   GFRESTIMATED >90  --  >90 >90 >90   MICAH 8.6  --  8.2* 8.3* 8.4*   PROTTOTAL 6.9  --  6.6* 6.9 7.3   ALBUMIN 3.4  --  3.3* 3.5 3.7   BILITOTAL 2.6*  --  2.7* 4.5* 4.8*   ALKPHOS 62  --  63 65 68   AST 77*  --  85* 89* 81*   ALT 70*  --  80* 81* 78*     CBC  Recent Labs   Lab 07/16/21  0606 07/15/21  0812 07/14/21  1730 07/13/21  1630   WBC 10.4 12.6* 14.0* 14.3*   RBC 2.52* 2.38* 2.46* 2.37*   HGB 7.9* 7.5* 7.6* 7.4*    HCT 22.4* 21.5* 21.9* 21.2*   MCV 89 90 89 90   MCH 31.3 31.5 30.9 31.2   MCHC 35.3 34.9 34.7 34.9   RDW 23.5* 22.2* 20.6* 22.2*    295 253 274     INRNo lab results found in last 7 days.    Medications list for reference:  Current Facility-Administered Medications   Medication     0.9% sodium chloride BOLUS     acetaminophen (TYLENOL) tablet 650 mg     albuterol (PROAIR HFA/PROVENTIL HFA/VENTOLIN HFA) 108 (90 Base) MCG/ACT inhaler 2 puff     albuterol (PROVENTIL) neb solution 2.5 mg     ARIPiprazole (ABILIFY) tablet 2 mg     carbamide peroxide (DEBROX) 6.5 % otic solution 10 drop     dabigatran ANTICOAGULANT (PRADAXA) capsule 150 mg     deferasirox (JADENU) tablet 1,440 mg     diclofenac (VOLTAREN) 1 % topical gel 4 g     diphenhydrAMINE (BENADRYL) capsule 25-50 mg     fluticasone-vilanterol (BREO ELLIPTA) 200-25 MCG/INH inhaler 1 puff     hydroxyurea (HYDREA) capsule 2,000 mg     hydrOXYzine (ATARAX) tablet 25 mg     lactated ringers infusion     lidocaine (LMX4) cream     lidocaine 1 % 0.1-1 mL     morphine  PCA 1 mg/mL OPIOID TOLERANT     morphine (PF) injection 2-4 mg     ondansetron (ZOFRAN-ODT) ODT tab 4 mg    Or     ondansetron (ZOFRAN) injection 4 mg     [Held by provider] oxyCODONE (oxyCONTIN) 12 hr tablet 10 mg     [Held by provider] oxyCODONE (ROXICODONE) tablet 15 mg     Patient is already receiving anticoagulation with heparin, enoxaparin (LOVENOX), warfarin (COUMADIN)  or other anticoagulant medication     polyethylene glycol (MIRALAX) Packet 17 g     prochlorperazine (COMPAZINE) injection 10 mg    Or     prochlorperazine (COMPAZINE) tablet 10 mg    Or     prochlorperazine (COMPAZINE) suppository 25 mg     senna-docusate (SENOKOT-S/PERICOLACE) 8.6-50 MG per tablet 1 tablet    Or     senna-docusate (SENOKOT-S/PERICOLACE) 8.6-50 MG per tablet 2 tablet     sertraline (ZOLOFT) tablet 200 mg     sodium chloride (PF) 0.9% PF flush 3 mL     sodium chloride (PF) 0.9% PF flush 3 mL

## 2021-07-18 LAB
ALBUMIN SERPL-MCNC: 3.3 G/DL (ref 3.4–5)
ALP SERPL-CCNC: 63 U/L (ref 40–150)
ALT SERPL W P-5'-P-CCNC: 67 U/L (ref 0–50)
ANION GAP SERPL CALCULATED.3IONS-SCNC: 1 MMOL/L (ref 3–14)
AST SERPL W P-5'-P-CCNC: 70 U/L (ref 0–45)
BILIRUB SERPL-MCNC: 2.6 MG/DL (ref 0.2–1.3)
BUN SERPL-MCNC: 5 MG/DL (ref 7–30)
CALCIUM SERPL-MCNC: 8.6 MG/DL (ref 8.5–10.1)
CHLORIDE BLD-SCNC: 108 MMOL/L (ref 94–109)
CO2 SERPL-SCNC: 31 MMOL/L (ref 20–32)
CREAT SERPL-MCNC: 0.5 MG/DL (ref 0.52–1.04)
GFR SERPL CREATININE-BSD FRML MDRD: >90 ML/MIN/1.73M2
GLUCOSE BLD-MCNC: 83 MG/DL (ref 70–99)
POTASSIUM BLD-SCNC: 3.8 MMOL/L (ref 3.4–5.3)
PROT SERPL-MCNC: 7 G/DL (ref 6.8–8.8)
RETICS # AUTO: 0.49 10E6/UL (ref 0.03–0.1)
RETICS/RBC NFR AUTO: 19.1 % (ref 0.5–2)
SODIUM SERPL-SCNC: 140 MMOL/L (ref 133–144)

## 2021-07-18 PROCEDURE — 258N000003 HC RX IP 258 OP 636: Performed by: PHYSICIAN ASSISTANT

## 2021-07-18 PROCEDURE — 250N000011 HC RX IP 250 OP 636: Performed by: STUDENT IN AN ORGANIZED HEALTH CARE EDUCATION/TRAINING PROGRAM

## 2021-07-18 PROCEDURE — 120N000002 HC R&B MED SURG/OB UMMC

## 2021-07-18 PROCEDURE — 85045 AUTOMATED RETICULOCYTE COUNT: CPT | Performed by: STUDENT IN AN ORGANIZED HEALTH CARE EDUCATION/TRAINING PROGRAM

## 2021-07-18 PROCEDURE — 80053 COMPREHEN METABOLIC PANEL: CPT | Performed by: STUDENT IN AN ORGANIZED HEALTH CARE EDUCATION/TRAINING PROGRAM

## 2021-07-18 PROCEDURE — 36592 COLLECT BLOOD FROM PICC: CPT | Performed by: STUDENT IN AN ORGANIZED HEALTH CARE EDUCATION/TRAINING PROGRAM

## 2021-07-18 PROCEDURE — 99233 SBSQ HOSP IP/OBS HIGH 50: CPT | Performed by: STUDENT IN AN ORGANIZED HEALTH CARE EDUCATION/TRAINING PROGRAM

## 2021-07-18 PROCEDURE — 250N000011 HC RX IP 250 OP 636: Performed by: PHYSICIAN ASSISTANT

## 2021-07-18 PROCEDURE — 250N000013 HC RX MED GY IP 250 OP 250 PS 637: Performed by: STUDENT IN AN ORGANIZED HEALTH CARE EDUCATION/TRAINING PROGRAM

## 2021-07-18 PROCEDURE — 250N000013 HC RX MED GY IP 250 OP 250 PS 637: Performed by: PHYSICIAN ASSISTANT

## 2021-07-18 RX ADMIN — HYDROXYUREA 2000 MG: 500 CAPSULE ORAL at 22:18

## 2021-07-18 RX ADMIN — DABIGATRAN ETEXILATE MESYLATE 150 MG: 150 CAPSULE ORAL at 10:19

## 2021-07-18 RX ADMIN — Medication: at 09:29

## 2021-07-18 RX ADMIN — Medication: at 07:40

## 2021-07-18 RX ADMIN — ARIPIPRAZOLE 2 MG: 2 TABLET ORAL at 22:17

## 2021-07-18 RX ADMIN — SODIUM CHLORIDE, POTASSIUM CHLORIDE, SODIUM LACTATE AND CALCIUM CHLORIDE: 600; 310; 30; 20 INJECTION, SOLUTION INTRAVENOUS at 20:58

## 2021-07-18 RX ADMIN — DABIGATRAN ETEXILATE MESYLATE 150 MG: 150 CAPSULE ORAL at 22:17

## 2021-07-18 RX ADMIN — ONDANSETRON 4 MG: 2 INJECTION INTRAMUSCULAR; INTRAVENOUS at 08:17

## 2021-07-18 RX ADMIN — SODIUM CHLORIDE, POTASSIUM CHLORIDE, SODIUM LACTATE AND CALCIUM CHLORIDE: 600; 310; 30; 20 INJECTION, SOLUTION INTRAVENOUS at 07:40

## 2021-07-18 RX ADMIN — SERTRALINE HYDROCHLORIDE 200 MG: 100 TABLET ORAL at 22:17

## 2021-07-18 ASSESSMENT — ACTIVITIES OF DAILY LIVING (ADL)
ADLS_ACUITY_SCORE: 14

## 2021-07-18 NOTE — PLAN OF CARE
/55 (BP Location: Left arm)   Pulse 93   Temp 97  F (36.1  C) (Axillary)   Resp 20   SpO2 92%     Time 9754-6217      Reason for admission: Hb-SS disease with crisis, Acute respiratory failure with hypoxia, Pulmonary embolism   Vitals: VSS on RA- 2LPM via oxymask  Activity: Up Ind/SBA   Pain: c/o generalized pain, managed with PCA pump  Neuro: A&Ox4, speech logical  Mood/Behavior: calm, cooperative, flat affect, quiet  Cardiac: WNL  Respiratory: ORTEGA  GI/: No BM this shift, voiding without difficulty  Diet:Regular, pt ate 4 ice creams this shift  Lines: PIV x2, Port infusing LR and PCA  Skin: CDI  Labs/imaging: Potassium 3.8      New changes this shift: Pt c/o nausea this morning, IV zofran given w/ relief. PCA dose decreased this shift to 0.5 continuous w/ 0.8 dose q 20min for 1hr limit of 2.9mg/hr. Pt had a large urinary incontinence episode this afternoon.       Plan: Continue to monitor and follow POC.

## 2021-07-18 NOTE — PLAN OF CARE
Time:  1900-0730     Reason for admission:  SOB, PE, Hypoxia  Activity:  SBA  Pain:  C/o generalized pain throughout controlled with PCA pump  Neuro:  AOx4, able to make needs known. Pt has a flat affect at times and is withdrawn w/ some staff. Somnolent at beginning of shift  Cardiac:  WDL  Respiratory: SOB and ORTEGA, stable on 2L oxymask. Removes often but will reapply when asked. Drops to low 80s on RA. Diminished LS at bases.   GI/:  Voiding w/ urgency adequate amounts in BSC. Tiffanie urine. 1 BM overnight. 7/18.   Diet:  Reg diet. Tolerating well with good appetite.   Lines:  R arm PIV WDL. L arm PIV WDL. L chest Port infusing LR at 75ml/hr and PCA morphine 0.7mg/hr w/ 1.1mg pushes.   Skin:  Remains intact w/o issues.      New changes this shift: No acute changes overnight.     Plan:  Continue with PCA pump and follow POC.

## 2021-07-18 NOTE — PROGRESS NOTES
INTERNAL MEDICINE   DAILY PROGRESS NOTE  Gold 8      Jennifer Cervantes ([id number])   Admission Date: 7/13/2021  Date of Service: 07/18/2021      Changes Today     - Decrease PCA today  -Encouraged ambulation, diet advancement, use of bowel meds    Assessment & Plan   Jennifer Cervantes is a 21 year old female admitted on 7/13/2021. She has a past medical history significant for sickle cell anemia, history of stroke with right upper extremity hemiparesis, venous thrombosis/PE on xarelto, anxiety, depression, and asthma who presented to the ED from infusion clinic after found to be hypoxic and complains of myalgias, shortness of breath, cough and rhinorrhea. She is admitted to Internal Medicine for further work-up and management of acute on chronic PE.       # Acute hypoxic respiratory failure  # Acute on chronic pulmonary embolism  Presenting with myalgias, SOB, cough and rhinorrhea. Afebrile. Noted to be hypoxic to 80s at infusion clinic. WBC 14.3 CXR with mild hazy bibasilar opacities favored to be atelectasis vs infection. Started on cefepime/vanco in ED. Exam with occasional coarse breath sounds. Has history of acute chest syndrome requiring exchange transfusions as well as previous PE while on AC. NM scan this admission with new acute PE as well. Most likely etiology from PE.   - Continue dabigatran   - Appreciate Heme Consult      # Sickle cell anemia with pain crisis  Concern for acute chest as above. Did get one unit red cells this admisstion. Pain is the same and pt would like PCA increase  - Hematology consult appreciated  - Wean PCA dosing today  - Holding pta oxycontin, prn oxycodone  - Capnography, pulse oximetry  - Continue pta hydea, jadenu     Hypokalemia  Potassium low in setting of poor appetite today while feeling unwell.  - K replacement protocol     Asthma  PTA on symbicort, albuterol. Has used inhaler this week with no improvement in symptoms.  - Continue pta symbicort BID  - PRN albuterol HFA  "inhaler or nebulizer     Hx of PE, RUE DVT - Dabigatran  Hx of CVA with residual RUE deficits - Remote history of CVA in 2015  Anxiety/Depression - Continue pta zoloft 200 mg daily, abilify 2 mg daily        Diet: Combination Diet Regular Diet Adult    DVT Prophylaxis: dabigatran  Lopez Catheter: Not present  Central Lines: None  Code Status: Full Code        Subjective     Feeling about the same. Pain is \"maybe a little better.\" Breathing feels \"stable.\" Not a great appetite yet. Has been out of bed to bathroom but not much else in regards to activity.     ROS negative except above.    Objective   Most recent vital signs:  /58 (BP Location: Left arm)   Pulse 95   Temp 97.4  F (36.3  C) (Axillary)   Resp 18   SpO2 92%   Temp:  [97.4  F (36.3  C)-98.7  F (37.1  C)] 97.4  F (36.3  C)  Pulse:  [89-98] 95  Resp:  [18-22] 18  BP: (111-136)/(58-71) 112/58  SpO2:  [90 %-98 %] 92 %  Wt Readings from Last 2 Encounters:   07/12/21 77.1 kg (170 lb)   07/08/21 77.1 kg (170 lb)       Intake/Output Summary (Last 24 hours) at 7/15/2021 1002  Last data filed at 7/15/2021 0956  Gross per 24 hour   Intake 883 ml   Output 2125 ml   Net -1242 ml       Physical exam:  HEENT: NCAT, MMM  CV: RRR, S1/S2, extremities warm and well perfused, no lower extremity edema  Pulm: work of breathing improved from day prior, mild increase in work of breathing  Skin: warm, no jaundice  Neuro: Aox3, speech normal, moves all extremities symmetrically and equally  Psych: mood is better, not interested in talking much though    Labs  CMP  Recent Labs   Lab 07/18/21  0917 07/17/21  0839 07/16/21  0712 07/16/21  0606 07/15/21  0812    140  --  142 139   POTASSIUM 3.8 3.6 3.3* 3.2* 3.6   CHLORIDE 108 109  --  110* 111*   CO2 31 26  --  24 22   ANIONGAP 1* 5  --  8 6   GLC 83 81  --  104* 84   BUN 5* 2*  --  3* 6*   CR 0.50* 0.50*  --  0.56 0.59   GFRESTIMATED >90 >90  --  >90 >90   MICAH 8.6 8.6  --  8.2* 8.3*   PROTTOTAL 7.0 6.9  --  6.6* 6.9 "   ALBUMIN 3.3* 3.4  --  3.3* 3.5   BILITOTAL 2.6* 2.6*  --  2.7* 4.5*   ALKPHOS 63 62  --  63 65   AST 70* 77*  --  85* 89*   ALT 67* 70*  --  80* 81*     CBC  Recent Labs   Lab 07/16/21  0606 07/15/21  0812 07/14/21  1730 07/13/21  1630   WBC 10.4 12.6* 14.0* 14.3*   RBC 2.52* 2.38* 2.46* 2.37*   HGB 7.9* 7.5* 7.6* 7.4*   HCT 22.4* 21.5* 21.9* 21.2*   MCV 89 90 89 90   MCH 31.3 31.5 30.9 31.2   MCHC 35.3 34.9 34.7 34.9   RDW 23.5* 22.2* 20.6* 22.2*    295 253 274     INRNo lab results found in last 7 days.  Arterial Blood GasNo lab results found in last 7 days.      Imaging

## 2021-07-19 LAB
ALBUMIN SERPL-MCNC: 3.4 G/DL (ref 3.4–5)
ALP SERPL-CCNC: 68 U/L (ref 40–150)
ALT SERPL W P-5'-P-CCNC: 70 U/L (ref 0–50)
ANION GAP SERPL CALCULATED.3IONS-SCNC: 8 MMOL/L (ref 3–14)
AST SERPL W P-5'-P-CCNC: 78 U/L (ref 0–45)
BACTERIA BLD CULT: NO GROWTH
BILIRUB SERPL-MCNC: 3.2 MG/DL (ref 0.2–1.3)
BUN SERPL-MCNC: 5 MG/DL (ref 7–30)
CALCIUM SERPL-MCNC: 9.2 MG/DL (ref 8.5–10.1)
CHLORIDE BLD-SCNC: 105 MMOL/L (ref 94–109)
CO2 SERPL-SCNC: 27 MMOL/L (ref 20–32)
CREAT SERPL-MCNC: 0.58 MG/DL (ref 0.52–1.04)
ERYTHROCYTE [DISTWIDTH] IN BLOOD BY AUTOMATED COUNT: 21.8 % (ref 10–15)
GFR SERPL CREATININE-BSD FRML MDRD: >90 ML/MIN/1.73M2
GLUCOSE BLD-MCNC: 97 MG/DL (ref 70–99)
HCT VFR BLD AUTO: 23.8 % (ref 35–47)
HGB BLD-MCNC: 8.1 G/DL (ref 11.7–15.7)
MCH RBC QN AUTO: 31.9 PG (ref 26.5–33)
MCHC RBC AUTO-ENTMCNC: 34 G/DL (ref 31.5–36.5)
MCV RBC AUTO: 94 FL (ref 78–100)
PLATELET # BLD AUTO: 305 10E3/UL (ref 150–450)
POTASSIUM BLD-SCNC: 3.7 MMOL/L (ref 3.4–5.3)
PROT SERPL-MCNC: 7.3 G/DL (ref 6.8–8.8)
RBC # BLD AUTO: 2.54 10E6/UL (ref 3.8–5.2)
RETICS # AUTO: 0.43 10E6/UL (ref 0.03–0.1)
RETICS/RBC NFR AUTO: 17.1 % (ref 0.5–2)
SODIUM SERPL-SCNC: 140 MMOL/L (ref 133–144)
WBC # BLD AUTO: 11.6 10E3/UL (ref 4–11)

## 2021-07-19 PROCEDURE — 258N000003 HC RX IP 258 OP 636: Performed by: PHYSICIAN ASSISTANT

## 2021-07-19 PROCEDURE — 250N000011 HC RX IP 250 OP 636: Performed by: STUDENT IN AN ORGANIZED HEALTH CARE EDUCATION/TRAINING PROGRAM

## 2021-07-19 PROCEDURE — 99232 SBSQ HOSP IP/OBS MODERATE 35: CPT | Performed by: STUDENT IN AN ORGANIZED HEALTH CARE EDUCATION/TRAINING PROGRAM

## 2021-07-19 PROCEDURE — 250N000013 HC RX MED GY IP 250 OP 250 PS 637: Performed by: PHYSICIAN ASSISTANT

## 2021-07-19 PROCEDURE — 36415 COLL VENOUS BLD VENIPUNCTURE: CPT | Performed by: STUDENT IN AN ORGANIZED HEALTH CARE EDUCATION/TRAINING PROGRAM

## 2021-07-19 PROCEDURE — 82040 ASSAY OF SERUM ALBUMIN: CPT | Performed by: STUDENT IN AN ORGANIZED HEALTH CARE EDUCATION/TRAINING PROGRAM

## 2021-07-19 PROCEDURE — 99232 SBSQ HOSP IP/OBS MODERATE 35: CPT | Mod: GC | Performed by: PEDIATRICS

## 2021-07-19 PROCEDURE — 250N000013 HC RX MED GY IP 250 OP 250 PS 637: Performed by: STUDENT IN AN ORGANIZED HEALTH CARE EDUCATION/TRAINING PROGRAM

## 2021-07-19 PROCEDURE — 85027 COMPLETE CBC AUTOMATED: CPT | Performed by: PHYSICIAN ASSISTANT

## 2021-07-19 PROCEDURE — 85045 AUTOMATED RETICULOCYTE COUNT: CPT | Performed by: STUDENT IN AN ORGANIZED HEALTH CARE EDUCATION/TRAINING PROGRAM

## 2021-07-19 PROCEDURE — 120N000002 HC R&B MED SURG/OB UMMC

## 2021-07-19 RX ORDER — HEPARIN SODIUM,PORCINE 10 UNIT/ML
5-10 VIAL (ML) INTRAVENOUS EVERY 24 HOURS
Status: DISCONTINUED | OUTPATIENT
Start: 2021-07-19 | End: 2021-07-25 | Stop reason: HOSPADM

## 2021-07-19 RX ORDER — HYDROMORPHONE HYDROCHLORIDE 1 MG/ML
0.5 INJECTION, SOLUTION INTRAMUSCULAR; INTRAVENOUS; SUBCUTANEOUS
Status: DISCONTINUED | OUTPATIENT
Start: 2021-07-19 | End: 2021-07-24

## 2021-07-19 RX ORDER — HEPARIN SODIUM (PORCINE) LOCK FLUSH IV SOLN 100 UNIT/ML 100 UNIT/ML
5-10 SOLUTION INTRAVENOUS
Status: DISCONTINUED | OUTPATIENT
Start: 2021-07-19 | End: 2021-07-25 | Stop reason: HOSPADM

## 2021-07-19 RX ORDER — HEPARIN SODIUM,PORCINE 10 UNIT/ML
5-10 VIAL (ML) INTRAVENOUS
Status: DISCONTINUED | OUTPATIENT
Start: 2021-07-19 | End: 2021-07-25 | Stop reason: HOSPADM

## 2021-07-19 RX ADMIN — HYDROMORPHONE HYDROCHLORIDE 0.5 MG: 1 INJECTION, SOLUTION INTRAMUSCULAR; INTRAVENOUS; SUBCUTANEOUS at 14:53

## 2021-07-19 RX ADMIN — DABIGATRAN ETEXILATE MESYLATE 150 MG: 150 CAPSULE ORAL at 21:40

## 2021-07-19 RX ADMIN — OXYCODONE HYDROCHLORIDE 15 MG: 5 TABLET ORAL at 11:48

## 2021-07-19 RX ADMIN — DOCUSATE SODIUM 50 MG AND SENNOSIDES 8.6 MG 2 TABLET: 8.6; 5 TABLET, FILM COATED ORAL at 21:37

## 2021-07-19 RX ADMIN — DOCUSATE SODIUM 50 MG AND SENNOSIDES 8.6 MG 1 TABLET: 8.6; 5 TABLET, FILM COATED ORAL at 08:57

## 2021-07-19 RX ADMIN — DABIGATRAN ETEXILATE MESYLATE 150 MG: 150 CAPSULE ORAL at 08:57

## 2021-07-19 RX ADMIN — Medication 5 ML: at 14:52

## 2021-07-19 RX ADMIN — HYDROMORPHONE HYDROCHLORIDE 0.5 MG: 1 INJECTION, SOLUTION INTRAMUSCULAR; INTRAVENOUS; SUBCUTANEOUS at 22:29

## 2021-07-19 RX ADMIN — OXYCODONE HYDROCHLORIDE 15 MG: 5 TABLET ORAL at 15:54

## 2021-07-19 RX ADMIN — SODIUM CHLORIDE, POTASSIUM CHLORIDE, SODIUM LACTATE AND CALCIUM CHLORIDE: 600; 310; 30; 20 INJECTION, SOLUTION INTRAVENOUS at 09:09

## 2021-07-19 RX ADMIN — HYDROMORPHONE HYDROCHLORIDE 0.5 MG: 1 INJECTION, SOLUTION INTRAMUSCULAR; INTRAVENOUS; SUBCUTANEOUS at 17:55

## 2021-07-19 RX ADMIN — SERTRALINE HYDROCHLORIDE 200 MG: 100 TABLET ORAL at 22:29

## 2021-07-19 RX ADMIN — HYDROXYUREA 2000 MG: 500 CAPSULE ORAL at 21:38

## 2021-07-19 RX ADMIN — HYDROMORPHONE HYDROCHLORIDE 0.5 MG: 1 INJECTION, SOLUTION INTRAMUSCULAR; INTRAVENOUS; SUBCUTANEOUS at 16:26

## 2021-07-19 RX ADMIN — HYDROMORPHONE HYDROCHLORIDE 0.5 MG: 1 INJECTION, SOLUTION INTRAMUSCULAR; INTRAVENOUS; SUBCUTANEOUS at 20:38

## 2021-07-19 RX ADMIN — CARBAMIDE PEROXIDE 6.5% 10 DROP: 6.5 LIQUID AURICULAR (OTIC) at 21:40

## 2021-07-19 RX ADMIN — Medication 5 ML: at 13:12

## 2021-07-19 RX ADMIN — ARIPIPRAZOLE 2 MG: 2 TABLET ORAL at 22:29

## 2021-07-19 RX ADMIN — ACETAMINOPHEN 650 MG: 325 TABLET, FILM COATED ORAL at 13:42

## 2021-07-19 ASSESSMENT — ACTIVITIES OF DAILY LIVING (ADL)
ADLS_ACUITY_SCORE: 16
ADLS_ACUITY_SCORE: 15
ADLS_ACUITY_SCORE: 15
ADLS_ACUITY_SCORE: 16
ADLS_ACUITY_SCORE: 15
ADLS_ACUITY_SCORE: 15

## 2021-07-19 NOTE — PROGRESS NOTES
I, Alberto Larson, was present with the medical/ANDREAS student who participated in the service and in the documentation of the note.  I have verified the history and personally performed the physical exam and medical decision making.  I agree with the assessment and plan of care as documented in the note.       I personally reviewed vital signs, medications, labs and imaging.     Pain feels about the same today.  Breathing feels good.  She has been out of bed but only to ambulate to the bathroom.  Appetite is fair.  She is interested in stopping the PCA today and trying oral pain meds with IV pain meds available for breakthrough pain.     Gen: awake and alert, appears comfortable  HEENT: NCAT, MMM  CV: RRR  Pulm: work of breathing improved from day prior, no respiratory distress  Skin: warm, no jaundice  Neuro: Aox3, speech normal, moves all extremities symmetrically and equally  Psych: mood is better, not interested in talking much though     AHRF - likely from PE, on dabigatran now. C/f acute chest and PNA is low and abx stopped 7/15.  Sickle cell pain crisis - Stop PCA and start oral oxycodone 15 mg every 4 hours as needed plus IV Dilaudid 0.5 mg every 1 hours as needed for breakthrough pain.  Hx CVA - continue to hold ASA while on OAC. Possibly restart on discharge but defer to hematology.   Hypokalemia, replace and trend with daily BMP.     Alberto Larson MD   Date I saw the patient 7/19/2021  ------------------------------------------------------         INTERNAL MEDICINE   DAILY PROGRESS NOTE  Gold 8      Jennifer Cervantes ([id number])   Admission Date: 7/13/2021  Date of Service: 07/19/2021      Changes Today     -Encouraged ambulation, diet advancement, use of bowel meds    Assessment & Plan   Jennifer Cervantes is a 21 year old female admitted on 7/13/2021. She has a past medical history significant for sickle cell anemia, history of stroke with right upper extremity hemiparesis, venous thrombosis/PE on  "xarelto, anxiety, depression, and asthma who presented to the ED from infusion clinic after found to be hypoxic and complains of myalgias, shortness of breath, cough and rhinorrhea. She is admitted to Internal Medicine for further work-up and management of acute on chronic PE.       # Acute hypoxic respiratory failure  # Acute on chronic pulmonary embolism  Presenting with myalgias, SOB, cough and rhinorrhea. Afebrile. Noted to be hypoxic to 80s at infusion clinic. WBC 14.3 CXR with mild hazy bibasilar opacities favored to be atelectasis vs infection. Started on cefepime/vanco in ED. Exam with occasional coarse breath sounds. Has history of acute chest syndrome requiring exchange transfusions as well as previous PE while on AC. NM scan this admission with new acute PE as well. Most likely etiology from PE.   - Continue dabigatran   - Appreciate Heme Consult      # Sickle cell anemia with pain crisis  Concern for acute chest as above. Did get one unit red cells this admisstion. Pain is the same and pt would like PCA increase  - Hematology consult appreciated  - Wean PCA dosing today  - Holding pta oxycontin, prn oxycodone  - Capnography, pulse oximetry  - Continue pta hydea, jadenu     Hypokalemia  Potassium low in setting of poor appetite today while feeling unwell.  - K replacement protocol     Asthma  PTA on symbicort, albuterol. Has used inhaler this week with no improvement in symptoms.  - Continue pta symbicort BID  - PRN albuterol HFA inhaler or nebulizer     Hx of PE, RUE DVT - Dabigatran  Hx of CVA with residual RUE deficits - Remote history of CVA in 2015  Anxiety/Depression - Continue pta zoloft 200 mg daily, abilify 2 mg daily        Diet: Combination Diet Regular Diet Adult    DVT Prophylaxis: dabigatran  Lopez Catheter: Not present  Central Lines: None  Code Status: Full Code        Subjective     Feeling about the same. Pain is \"maybe a little better.\" Breathing feels \"stable.\" Not a great appetite " yet. Has been out of bed to bathroom but not much else in regards to activity.     ROS negative except above.    Objective   Most recent vital signs:  /48 (BP Location: Left arm)   Pulse 85   Temp 98.2  F (36.8  C) (Oral)   Resp 22   Wt 77.9 kg (171 lb 11.8 oz)   SpO2 95%   BMI 29.48 kg/m    Temp:  [97  F (36.1  C)-98.2  F (36.8  C)] 98.2  F (36.8  C)  Pulse:  [70-93] 85  Resp:  [20-22] 22  BP: (113-122)/(48-66) 113/48  SpO2:  [95 %-97 %] 95 %  Wt Readings from Last 2 Encounters:   07/19/21 77.9 kg (171 lb 11.8 oz)   07/12/21 77.1 kg (170 lb)       Intake/Output Summary (Last 24 hours) at 7/15/2021 1002  Last data filed at 7/15/2021 0956  Gross per 24 hour   Intake 883 ml   Output 2125 ml   Net -1242 ml       Physical exam:  HEENT: NCAT, MMM  CV: RRR, S1/S2, extremities warm and well perfused, no lower extremity edema  Pulm: work of breathing improved from day prior, mild increase in work of breathing  Skin: warm, no jaundice  Neuro: Aox3, speech normal, moves all extremities symmetrically and equally  Psych: mood is better, not interested in talking much though    Labs  CMP  Recent Labs   Lab 07/19/21  0623 07/18/21  0917 07/17/21  0839 07/16/21  0712 07/16/21  0606    140 140  --  142   POTASSIUM 3.7 3.8 3.6 3.3* 3.2*   CHLORIDE 105 108 109  --  110*   CO2 27 31 26  --  24   ANIONGAP 8 1* 5  --  8   GLC 97 83 81  --  104*   BUN 5* 5* 2*  --  3*   CR 0.58 0.50* 0.50*  --  0.56   GFRESTIMATED >90 >90 >90  --  >90   MICAH 9.2 8.6 8.6  --  8.2*   PROTTOTAL 7.3 7.0 6.9  --  6.6*   ALBUMIN 3.4 3.3* 3.4  --  3.3*   BILITOTAL 3.2* 2.6* 2.6*  --  2.7*   ALKPHOS 68 63 62  --  63   AST 78* 70* 77*  --  85*   ALT 70* 67* 70*  --  80*     CBC  Recent Labs   Lab 07/19/21  0623 07/16/21  0606 07/15/21  0812 07/14/21  1730   WBC 11.6* 10.4 12.6* 14.0*   RBC 2.54* 2.52* 2.38* 2.46*   HGB 8.1* 7.9* 7.5* 7.6*   HCT 23.8* 22.4* 21.5* 21.9*   MCV 94 89 90 89   MCH 31.9 31.3 31.5 30.9   MCHC 34.0 35.3 34.9 34.7   RDW  21.8* 23.5* 22.2* 20.6*    321 295 253     INRNo lab results found in last 7 days.  Arterial Blood GasNo lab results found in last 7 days.      Imaging

## 2021-07-19 NOTE — PLAN OF CARE
Time:  2030-8439     Reason for admission:  SOB, PE, Hypoxia  Activity:  SBA  Pain:  C/o generalized pain throughout controlled with PCA pump  Neuro:  AOx4, able to make needs known. Pt has a flat affect at times and is withdrawn w/ some staff.  Cardiac:  WDL  Respiratory: Stable on 2L Oxymask overnight, had to be reminded multiple times to reapply mask. Diminished LS at bases. Wheezing at bases. Did not want neb at this time. No SOB reported overnight.  GI/:  Voiding w/ urgency adequate amounts in BSC. Tiffanie urine. 1 large incontinent episode this shift. LBM 7/18.   Diet:  Reg diet. Tolerating well with good appetite.   Lines:  L chest Port infusing LR at 75ml/hr and PCA morphine 0.5mg/hr w/ 0.8mg pushes.   Skin:  Remains intact w/o issues.     Plan:  Continue with PCA pump and follow POC.

## 2021-07-19 NOTE — PLAN OF CARE
"  Time: 0700-1930 1530   [id number]  Reason for admission/Dx:  Hb-SS disease with crisis (H) [D57.00]  Long term (current) use of anticoagulants [Z79.01]  Pulmonary infarction (H) [I26.99]  Acute respiratory failure with hypoxia (H) [J96.01]  Pulmonary embolism without acute cor pulmonale, unspecified chronicity, unspecified pulmonary embolism type (H) [I26.99]  Encounter for screening laboratory testing for severe acute respiratory syndrome coronavirus 2 (SARS-CoV-2) [Z20.822]    [Vitals]: /75 (BP Location: Left arm)   Pulse 110   Temp 97.9  F (36.6  C) (Oral)   Resp 18   Wt 77.9 kg (171 lb 11.8 oz)   SpO2 95%   BMI 29.48 kg/m      [Pain/PRN/s]: Switched from PCA to PO pain meds and IV pain meds today. Medicated with available PO and IV meds, patient declined diclofenac. Pain ranged 8-9 out of 10. Team aware. Patient declined hot/cold therapy and other pain management modalities. Patient stated \"only medicine and hot baths help.\"     [Respiratory]: Inspiratory wheezing with crackles. Needs reminders for IS and oximask.     [Cardiac]: WDL    [Neuros]: Appeared withdrawn, sad, and anxious this shift. Stating \"I want to go home.\" Provided active listening, warm blanket, relaxing low lighting, and offered self. Concerns with cooperation this AM but no issues noted since.     [GI]:Orders Placed This Encounter      Combination Diet Regular Diet Adult  Fair appetite, enjoys ice cream as a frequent snack    [I/O]: encouraged hydration. Voided x2 today with bedside commode.     []: Urgency    [Imaging/Procedures]: none today    [Skin/Wounds]: Possible heat rash noted on groin, interdry provided    [Lines]:    Port A Cath Single 04/21/21 Left Chest wall (Active)   Site Assessment WDL 07/19/21 0900   Dressing Status clean;dry;intact 07/19/21 0900   Dressing Intervention Transparent 07/16/21 2000   Dressing change due 07/20/21 07/19/21 0900   Line Status Infusing 07/19/21 0900   Access Date 07/13/21 07/13/21 1046 "   Access Attempts 1 07/13/21 1046   Gauge Power noncoring 90 degree bend;20 gauge;3/4 inch 07/13/21 1046   Line Necessity Yes, meets criteria 07/19/21 0900   Number of days: 89    - PCA discontinued at 1141, port heparin locked.     [Infusions]: LR @ 75 mL/hr and PCA discontinued @ 1141. Prior rate: Continuous 0.5 mg/hr, PCA does 0.8 mg, lockout interval 20 minutes, 1 hour limit 2.9 mg, with 2.3 mg delivered between start of shift and time discontinued.    [Activity]: Stand by assist, had bed bath and linen change today.      [Labs/Lactic]:  Hemoglobin (g/dL)   Date Value   07/19/2021 8.1 (L)   07/10/2021 7.8 (L)     Creatinine (mg/dL)   Date Value   07/19/2021 0.58   07/10/2021 0.50 (L)     Platelet Count   Date Value   07/19/2021 305 10e3/uL   07/10/2021 290 10e9/L     Hematocrit (%)   Date Value   07/19/2021 23.8 (L)   07/10/2021 22.2 (L)     WBC (10e9/L)   Date Value   07/10/2021 15.8 (H)     WBC Count (10e3/uL)   Date Value   07/19/2021 11.6 (H)     Potassium (mmol/L)   Date Value   07/19/2021 3.7   07/10/2021 3.9     Sodium (mmol/L)   Date Value   07/19/2021 140   07/10/2021 140     Magnesium (mg/dL)   Date Value   02/21/2021 1.7     Phosphorus (mg/dL)   Date Value   02/21/2021 3.6     Calcium (mg/dL)   Date Value   07/19/2021 9.2   07/10/2021 8.4 (L)      Lactic Acid (mmol/L)   Date Value   07/16/2021 1.3   07/14/2021 0.8   06/23/2021 0.8   06/20/2021 0.9        [BG]:   Glucose Values Bedside Glucose (mg/dl )  GLUCOSE   Latest Ref Rng & Units - 70 - 99 mg/dL   7/19/2021 -- 97   7/18/2021 -- 83   7/17/2021 -- 81   Some recent data might be hidden        [Electrolytes]:  Potassium (mmol/L)   Date Value   07/19/2021 3.7   07/10/2021 3.9     Magnesium (mg/dL)   Date Value   02/21/2021 1.7     Phosphorus (mg/dL)   Date Value   02/21/2021 3.6     Sodium   Date Value Ref Range Status   07/19/2021 140 133 - 144 mmol/L Final   07/10/2021 140 133 - 144 mmol/L Final      Other:    - At 0900 with AM meds, patient  "refused RN presence during PO administration three times. Patient stated \"I'll take them if you leave the room,\" \"I don't want people watching,\" and \"please leave.\" Writer reinforced importance of RN presence during medication administration, patient refusal continued. Writer provided patient privacy and upon reentry PO meds were taken. In addition, medication capsule found on floor, disposed of in hazardous medication waste container. Primary team notified along with charge RN. Will continue to reinforce importance of RN presence during medication administration. At 1000am, three hydroxyurea tablets found on floor. When confronted, patient responded that they were from \"before.\" Writer disposed of medication in hazardous medication waste container.   - No issues with RN presence during 1130 med administration  - Writer had conversation with patient about current hospitalization. Patient expressed feelings of loneliness, sadness, and feeling overwhelmed. Patient stated feeling supported and improved mood when informed of plan of care often and when AIDET utilized frequently.   Plan: Continue to monitor. Manage pain, possible discharge once pain managed PO. Ambulation.            "

## 2021-07-19 NOTE — PROGRESS NOTES
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 She  was admitted on 7/13/2021 with dyspnea and cough concerning for acute chest syndrome.     Likely cause of hypoxia secondary to acute PE despite anticoagulation with rivaroxaban. Presently, concern that patient may have some resistance to anticoagulation that targets Xa such as DOACs and potentially lovenox. At this point would trial direct thrombin inhibitor such as dabigatran.      Hematology/Oncology Problem list:   # Acute hypoxic respiratory failure secondary to PE  # Sickle Cell anemia with history of acute chest syndrome  # Right innominate vein thrombosis + 3 moderate subsegmental perfusion defects (2/1/21) on rivaroxaban     Summary of Recommendations:    Low threshold to repeat chest imaging if worsening hypoxia, or fever    If febrile collect blood cultures, complete repeat chest xray, and call hematology    Continue pain management per primary team- continue to wean   1. Inpatient:  1. Morphine PCA  2. Other Medications: Benadryl, Zofran  3. If PCA not working, she Has had success with ketamine starting at 4mg/h and advancing only to 6mg/h, as 8mg/h made her feel quite poorly.  4. ASA on hold (likely restart at discharge)  5. Supportive Care: Docusate, Senna    Continue dabigatran 150mg BID this evening.     Please ensure adequate bowel regimen.     Encourage incentive spirometry.          Thank you for involving us in the  care of this patient. We will continue to follow during the hospitalization.     Patient was seen and plan of care developed with Dr. Duncan.  Akhil Gutiérrez MD   Hematology  Fellow  Pager: [phone]  _____________________________    Subjective & Interval History:    No acute events noted overnight.  RN notes patient unwillingness to take PO medications in front of nursing staff. Dabigatran pill found on the floor, date of missed medication is unknown. Discussed with patient who states that pill found was a random pill that fell out of her purse after cleaning it over the weekend. Patient expressed understanding of the importance of taking medications as prescribed while in the hospital.     Physical Exam:    Blood pressure 113/48, pulse 85, temperature 98.2  F (36.8  C), temperature source Oral, resp. rate 22, weight 77.9 kg (171 lb 11.8 oz), SpO2 95 %, not currently breastfeeding.    ENERAL: Laying in  NAD  HEENT: AT/NC,  RESP: non labored breathing on oxymask  SKIN: Warm and dry, no jaundice or rash on limited exam  NEURO: speech fluent, mentation intact,  PSYCH: awake , linear thought, intermittently agitated    Labs & Studies: I personally reviewed the following studies:  ROUTINE LABS (Last four results):  CMP  Recent Labs   Lab 07/19/21  0623 07/18/21  0917 07/17/21  0839 07/16/21  0712 07/16/21  0606    140 140  --  142   POTASSIUM 3.7 3.8 3.6 3.3* 3.2*   CHLORIDE 105 108 109  --  110*   CO2 27 31 26  --  24   ANIONGAP 8 1* 5  --  8   GLC 97 83 81  --  104*   BUN 5* 5* 2*  --  3*   CR 0.58 0.50* 0.50*  --  0.56   GFRESTIMATED >90 >90 >90  --  >90   MICAH 9.2 8.6 8.6  --  8.2*   PROTTOTAL 7.3 7.0 6.9  --  6.6*   ALBUMIN 3.4 3.3* 3.4  --  3.3*   BILITOTAL 3.2* 2.6* 2.6*  --  2.7*   ALKPHOS 68 63 62  --  63   AST 78* 70* 77*  --  85*   ALT 70* 67* 70*  --  80*     CBC  Recent Labs   Lab 07/19/21  0623 07/16/21  0606 07/15/21  0812 07/14/21  1730   WBC 11.6* 10.4 12.6* 14.0*   RBC 2.54* 2.52* 2.38* 2.46*   HGB 8.1*  7.9* 7.5* 7.6*   HCT 23.8* 22.4* 21.5* 21.9*   MCV 94 89 90 89   MCH 31.9 31.3 31.5 30.9   MCHC 34.0 35.3 34.9 34.7   RDW 21.8* 23.5* 22.2* 20.6*    321 295 253     INRNo lab results found in last 7 days.    Medications list for reference:  Current Facility-Administered Medications   Medication     0.9% sodium chloride BOLUS     acetaminophen (TYLENOL) tablet 650 mg     albuterol (PROAIR HFA/PROVENTIL HFA/VENTOLIN HFA) 108 (90 Base) MCG/ACT inhaler 2 puff     albuterol (PROVENTIL) neb solution 2.5 mg     ARIPiprazole (ABILIFY) tablet 2 mg     carbamide peroxide (DEBROX) 6.5 % otic solution 10 drop     dabigatran ANTICOAGULANT (PRADAXA) capsule 150 mg     deferasirox (JADENU) tablet 1,440 mg     diclofenac (VOLTAREN) 1 % topical gel 4 g     diphenhydrAMINE (BENADRYL) capsule 25-50 mg     fluticasone-vilanterol (BREO ELLIPTA) 200-25 MCG/INH inhaler 1 puff     hydroxyurea (HYDREA) capsule 2,000 mg     hydrOXYzine (ATARAX) tablet 25 mg     lactated ringers infusion     lidocaine (LMX4) cream     lidocaine 1 % 0.1-1 mL     morphine  PCA 1 mg/mL OPIOID TOLERANT     morphine (PF) injection 2-4 mg     ondansetron (ZOFRAN-ODT) ODT tab 4 mg    Or     ondansetron (ZOFRAN) injection 4 mg     [Held by provider] oxyCODONE (oxyCONTIN) 12 hr tablet 10 mg     [Held by provider] oxyCODONE (ROXICODONE) tablet 15 mg     Patient is already receiving anticoagulation with heparin, enoxaparin (LOVENOX), warfarin (COUMADIN)  or other anticoagulant medication     polyethylene glycol (MIRALAX) Packet 17 g     prochlorperazine (COMPAZINE) injection 10 mg    Or     prochlorperazine (COMPAZINE) tablet 10 mg    Or     prochlorperazine (COMPAZINE) suppository 25 mg     senna-docusate (SENOKOT-S/PERICOLACE) 8.6-50 MG per tablet 1 tablet    Or     senna-docusate (SENOKOT-S/PERICOLACE) 8.6-50 MG per tablet 2 tablet     sertraline (ZOLOFT) tablet 200 mg     sodium chloride (PF) 0.9% PF flush 3 mL     sodium chloride (PF) 0.9% PF flush 3 mL

## 2021-07-20 ENCOUNTER — APPOINTMENT (OUTPATIENT)
Dept: GENERAL RADIOLOGY | Facility: CLINIC | Age: 21
DRG: 871 | End: 2021-07-20
Attending: INTERNAL MEDICINE
Payer: COMMERCIAL

## 2021-07-20 LAB
ALBUMIN SERPL-MCNC: 3.8 G/DL (ref 3.4–5)
ALP SERPL-CCNC: 73 U/L (ref 40–150)
ALT SERPL W P-5'-P-CCNC: 75 U/L (ref 0–50)
ANION GAP SERPL CALCULATED.3IONS-SCNC: 5 MMOL/L (ref 3–14)
AST SERPL W P-5'-P-CCNC: 84 U/L (ref 0–45)
BILIRUB SERPL-MCNC: 3.8 MG/DL (ref 0.2–1.3)
BUN SERPL-MCNC: 8 MG/DL (ref 7–30)
CALCIUM SERPL-MCNC: 9.2 MG/DL (ref 8.5–10.1)
CHLORIDE BLD-SCNC: 106 MMOL/L (ref 94–109)
CO2 SERPL-SCNC: 27 MMOL/L (ref 20–32)
CREAT SERPL-MCNC: 0.58 MG/DL (ref 0.52–1.04)
GFR SERPL CREATININE-BSD FRML MDRD: >90 ML/MIN/1.73M2
GLUCOSE BLD-MCNC: 87 MG/DL (ref 70–99)
POTASSIUM BLD-SCNC: 4 MMOL/L (ref 3.4–5.3)
PROT SERPL-MCNC: 8.1 G/DL (ref 6.8–8.8)
RETICS # AUTO: 0.45 10E6/UL (ref 0.03–0.1)
RETICS/RBC NFR AUTO: 17.1 % (ref 0.5–2)
SODIUM SERPL-SCNC: 138 MMOL/L (ref 133–144)

## 2021-07-20 PROCEDURE — 250N000011 HC RX IP 250 OP 636: Performed by: PHYSICIAN ASSISTANT

## 2021-07-20 PROCEDURE — 250N000009 HC RX 250: Performed by: INTERNAL MEDICINE

## 2021-07-20 PROCEDURE — 250N000013 HC RX MED GY IP 250 OP 250 PS 637: Performed by: INTERNAL MEDICINE

## 2021-07-20 PROCEDURE — 250N000011 HC RX IP 250 OP 636: Performed by: HOSPITALIST

## 2021-07-20 PROCEDURE — 250N000011 HC RX IP 250 OP 636: Performed by: STUDENT IN AN ORGANIZED HEALTH CARE EDUCATION/TRAINING PROGRAM

## 2021-07-20 PROCEDURE — 99232 SBSQ HOSP IP/OBS MODERATE 35: CPT | Mod: GC | Performed by: PEDIATRICS

## 2021-07-20 PROCEDURE — 250N000009 HC RX 250: Performed by: PHYSICIAN ASSISTANT

## 2021-07-20 PROCEDURE — 71045 X-RAY EXAM CHEST 1 VIEW: CPT | Mod: 26 | Performed by: RADIOLOGY

## 2021-07-20 PROCEDURE — 250N000013 HC RX MED GY IP 250 OP 250 PS 637: Performed by: STUDENT IN AN ORGANIZED HEALTH CARE EDUCATION/TRAINING PROGRAM

## 2021-07-20 PROCEDURE — 71045 X-RAY EXAM CHEST 1 VIEW: CPT

## 2021-07-20 PROCEDURE — 120N000002 HC R&B MED SURG/OB UMMC

## 2021-07-20 PROCEDURE — 36592 COLLECT BLOOD FROM PICC: CPT | Performed by: STUDENT IN AN ORGANIZED HEALTH CARE EDUCATION/TRAINING PROGRAM

## 2021-07-20 PROCEDURE — 85045 AUTOMATED RETICULOCYTE COUNT: CPT | Performed by: STUDENT IN AN ORGANIZED HEALTH CARE EDUCATION/TRAINING PROGRAM

## 2021-07-20 PROCEDURE — 250N000013 HC RX MED GY IP 250 OP 250 PS 637: Performed by: PHYSICIAN ASSISTANT

## 2021-07-20 PROCEDURE — 99233 SBSQ HOSP IP/OBS HIGH 50: CPT | Performed by: INTERNAL MEDICINE

## 2021-07-20 PROCEDURE — 80053 COMPREHEN METABOLIC PANEL: CPT | Performed by: STUDENT IN AN ORGANIZED HEALTH CARE EDUCATION/TRAINING PROGRAM

## 2021-07-20 RX ORDER — ALBUTEROL SULFATE 0.83 MG/ML
2.5 SOLUTION RESPIRATORY (INHALATION)
Status: DISCONTINUED | OUTPATIENT
Start: 2021-07-20 | End: 2021-07-25 | Stop reason: HOSPADM

## 2021-07-20 RX ORDER — ASPIRIN 81 MG/1
81 TABLET, CHEWABLE ORAL DAILY
Status: DISCONTINUED | OUTPATIENT
Start: 2021-07-20 | End: 2021-07-25 | Stop reason: HOSPADM

## 2021-07-20 RX ADMIN — ALBUTEROL SULFATE 2.5 MG: 2.5 SOLUTION RESPIRATORY (INHALATION) at 10:17

## 2021-07-20 RX ADMIN — FLUTICASONE FUROATE AND VILANTEROL TRIFENATATE 1 PUFF: 200; 25 POWDER RESPIRATORY (INHALATION) at 08:22

## 2021-07-20 RX ADMIN — MORPHINE SULFATE 4 MG: 2 INJECTION, SOLUTION INTRAMUSCULAR; INTRAVENOUS at 20:56

## 2021-07-20 RX ADMIN — HYDROMORPHONE HYDROCHLORIDE 0.5 MG: 1 INJECTION, SOLUTION INTRAMUSCULAR; INTRAVENOUS; SUBCUTANEOUS at 13:00

## 2021-07-20 RX ADMIN — OXYCODONE HYDROCHLORIDE 15 MG: 5 TABLET ORAL at 19:42

## 2021-07-20 RX ADMIN — HYDROMORPHONE HYDROCHLORIDE 0.5 MG: 1 INJECTION, SOLUTION INTRAMUSCULAR; INTRAVENOUS; SUBCUTANEOUS at 22:34

## 2021-07-20 RX ADMIN — HYDROMORPHONE HYDROCHLORIDE 0.5 MG: 1 INJECTION, SOLUTION INTRAMUSCULAR; INTRAVENOUS; SUBCUTANEOUS at 10:16

## 2021-07-20 RX ADMIN — ALBUTEROL SULFATE 2.5 MG: 2.5 SOLUTION RESPIRATORY (INHALATION) at 20:01

## 2021-07-20 RX ADMIN — HYDROXYUREA 2000 MG: 500 CAPSULE ORAL at 22:32

## 2021-07-20 RX ADMIN — HYDROMORPHONE HYDROCHLORIDE 0.5 MG: 1 INJECTION, SOLUTION INTRAMUSCULAR; INTRAVENOUS; SUBCUTANEOUS at 05:18

## 2021-07-20 RX ADMIN — HYDROMORPHONE HYDROCHLORIDE 0.5 MG: 1 INJECTION, SOLUTION INTRAMUSCULAR; INTRAVENOUS; SUBCUTANEOUS at 06:48

## 2021-07-20 RX ADMIN — HYDROMORPHONE HYDROCHLORIDE 0.5 MG: 1 INJECTION, SOLUTION INTRAMUSCULAR; INTRAVENOUS; SUBCUTANEOUS at 14:23

## 2021-07-20 RX ADMIN — DABIGATRAN ETEXILATE MESYLATE 150 MG: 150 CAPSULE ORAL at 22:34

## 2021-07-20 RX ADMIN — DOCUSATE SODIUM 50 MG AND SENNOSIDES 8.6 MG 1 TABLET: 8.6; 5 TABLET, FILM COATED ORAL at 22:34

## 2021-07-20 RX ADMIN — SERTRALINE HYDROCHLORIDE 200 MG: 100 TABLET ORAL at 22:34

## 2021-07-20 RX ADMIN — ASPIRIN 81 MG CHEWABLE TABLET 81 MG: 81 TABLET CHEWABLE at 22:34

## 2021-07-20 RX ADMIN — HYDROMORPHONE HYDROCHLORIDE 0.5 MG: 1 INJECTION, SOLUTION INTRAMUSCULAR; INTRAVENOUS; SUBCUTANEOUS at 08:18

## 2021-07-20 RX ADMIN — HYDROMORPHONE HYDROCHLORIDE 0.5 MG: 1 INJECTION, SOLUTION INTRAMUSCULAR; INTRAVENOUS; SUBCUTANEOUS at 19:42

## 2021-07-20 RX ADMIN — OXYCODONE HYDROCHLORIDE 15 MG: 5 TABLET ORAL at 13:00

## 2021-07-20 RX ADMIN — OXYCODONE HYDROCHLORIDE 15 MG: 5 TABLET ORAL at 08:18

## 2021-07-20 RX ADMIN — ARIPIPRAZOLE 2 MG: 2 TABLET ORAL at 22:38

## 2021-07-20 RX ADMIN — HYDROMORPHONE HYDROCHLORIDE 0.5 MG: 1 INJECTION, SOLUTION INTRAMUSCULAR; INTRAVENOUS; SUBCUTANEOUS at 16:15

## 2021-07-20 RX ADMIN — DABIGATRAN ETEXILATE MESYLATE 150 MG: 150 CAPSULE ORAL at 08:18

## 2021-07-20 RX ADMIN — Medication 5 ML: at 07:38

## 2021-07-20 RX ADMIN — ALBUTEROL SULFATE 2.5 MG: 2.5 SOLUTION RESPIRATORY (INHALATION) at 14:30

## 2021-07-20 RX ADMIN — HYDROMORPHONE HYDROCHLORIDE 0.5 MG: 1 INJECTION, SOLUTION INTRAMUSCULAR; INTRAVENOUS; SUBCUTANEOUS at 03:11

## 2021-07-20 RX ADMIN — ONDANSETRON 4 MG: 2 INJECTION INTRAMUSCULAR; INTRAVENOUS at 18:01

## 2021-07-20 ASSESSMENT — ACTIVITIES OF DAILY LIVING (ADL)
ADLS_ACUITY_SCORE: 15

## 2021-07-20 NOTE — PROGRESS NOTES
CLINICAL NUTRITION SERVICES    Reviewed nutrition risk factors due to LOS x 7 days. Pt is tolerating regular diet, eating fairly well per nursing documentation. No nutrition issues identified at this time. RD will follow via rounds and or per protocols at this time, unless consulted.    Scott Cordova RD/LINDA  Pager [phone]

## 2021-07-20 NOTE — PLAN OF CARE
2300 - 0730:    VSS on RA. Pt occasionally utilizing 2L O2 via Oxymask. A&Ox4. Pleasant this shift. SBA/Independent to BSC. L chest port saline locked - WNL. Pt complaining of generalized pain. PRN IV Dilaudid given. Pt refusing PO Oxycodone and IV Morphine this shift. Frequent congested cough - pt declined intervention. Denies SOB. Regular diet. Tolerating well. Voiding WNL. Pt denies BM this shift.

## 2021-07-20 NOTE — PROGRESS NOTES
Physician Attestation   I, Suraj Aguillon, saw and evaluated Jennifer Cervantes as part of a shared visit.  I have reviewed and discussed with the advanced practice provider/student their history, physical and plan.    I personally reviewed the vital signs, medications, labs and imaging.    My key history or physical exam findings: Patient seen and examined today currently feels stable compared to yesterday, still short of breath with ambulation with persistent cough, wheezing, and pain.  No chest pain, productive cough, or other symptoms currently.     PE:   VS: Afebrile and stable  GEN: NAD  CV: RRR S1/S2, no m,r,g  Pulm: Slightly increased work of breathing, diffuse inspiratory and expiratory wheezes, basilar crackles.   Abd: soft, NT, ND, +BS  Ext: warm and well perfused, no edema    Key management decisions made by me: Pt is a 21 year old with Sickle cell anemia, multiple thrombotic complications presents with acute hypoxic respiratory failure secondary to acute PE, acute pain crisis, and asthma exacerbation.  Still symptomatic and hypoxic with increased wheezing today which precludes discharge.  Will escalate nebulizers and repeat a CXR.  Labs are otherwise reassuring.  Continue anticoagulation. .      Suraj Aguillon  Date of Service (when I saw the patient): 07/20/21      INTERNAL MEDICINE   DAILY PROGRESS NOTE  Gold 8      Jennifer Cervantes ([id number])   Admission Date: 7/13/2021  Date of Service: 07/20/2021      Changes Today     Continue:   PRN oral oxycodone 15 mg every 4 hours   PRN IV Dilaudid 0.5 every hour   PRN tylenol 650 mg every 6 hours  Walking Pulse Ox  Change PRN nebs from 6 hours to 2 hours  Encouraged ambulation, diet advancement and use of bowel meds    Assessment & Plan   Jennifer Cervantes is a 21 year old female admitted on 7/13/2021. She has a past medical history significant for sickle cell anemia, history of stroke with right upper extremity hemiparesis, venous thrombosis/PE on  xarelto, anxiety, depression, and asthma who presented to the ED from infusion clinic after found to be hypoxic and complains of myalgias, shortness of breath, cough and rhinorrhea. She is admitted to Internal Medicine for further work-up and management of acute on chronic PE.    # Acute hypoxic respiratory failure  # Acute on chronic pulmonary embolism  # Possible Asthma exacerbation   Presenting with myalgias, SOB, cough and rhinorrhea. Afebrile. Noted to be hypoxic to 80s at infusion clinic. Has history of acute chest syndrome requiring exchange transfusions as well as previous PE while on AC. NM scan this admission with new acute PE as well. 7/20 pt was acutly wheezer today, will walk pt and check pulse ox.   - [place] pulse Ox  - Continue dabigatran   - Appreciate Heme Consult          # Sickle cell anemia with pain crisis  Concern for acute chest as above. Did get one unit red cells this admisstion. Has had consistent pain this admission  - Hematology consult appreciated  - Continue PTA oxycontin, prn oxycodone  - Capnography, pulse oximetry  - Continue pta hydea, jadenu     Hypokalemia (resolved)  Potassium low in setting of poor appetite today while feeling unwell.  - K replacement protocol     Asthma  PTA on symbicort, albuterol. Has used inhaler this week with no improvement in symptoms.  - Continue pta symbicort BID  - PRN albuterol HFA inhaler or nebulizer  - PRN nebs changed from 6 hrs to 2 hours     Hx of PE, RUE DVT - Dabigatran  Hx of CVA with residual RUE deficits - Remote history of CVA in 2015  Anxiety/Depression - Continue pta zoloft 200 mg daily, abilify 2 mg daily        Diet: Combination Diet Regular Diet Adult    DVT Prophylaxis: dabigatran  Lopez Catheter: Not present  Central Lines: None  Code Status: Full Code    Seen and discussed with my attending physician,  ,who agrees with above assessment and plan.      Tati Mcclendon MS4      Subjective     Pt feeling similar to previous days.  Said she still had pain but it was similar to when she was on PCA pump. Was due for oxy and dilaudid this morning. Pt said breathing was okay but was wheezing more    Denies n/v or chills/fever    Nursing note was reviewed, no acute event overnight.    ROS negative except above.    Objective   Most recent vital signs:  /60 (BP Location: Left arm)   Pulse 98   Temp 97.7  F (36.5  C) (Oral)   Resp 18   Wt 77.9 kg (171 lb 11.8 oz)   SpO2 95%   BMI 29.48 kg/m    Temp:  [95.8  F (35.4  C)-97.9  F (36.6  C)] 97.7  F (36.5  C)  Pulse:  [] 98  Resp:  [18] 18  BP: (112-132)/(57-75) 113/60  SpO2:  [91 %-95 %] 95 %  Wt Readings from Last 2 Encounters:   07/19/21 77.9 kg (171 lb 11.8 oz)   07/12/21 77.1 kg (170 lb)       Intake/Output Summary (Last 24 hours) at 7/20/2021 0905  Last data filed at 7/20/2021 0738  Gross per 24 hour   Intake 242 ml   Output 2400 ml   Net -2158 ml       Physical exam:  General: Alert, oriented, cooperative, no apparent distress,   Eyes: Eyes are clear, pupils are reactive, no scleral icterus.  EOMI  HENT: Oropharynx is clear and moist. No evidence of cranial trauma.  Lymph/Hematologic: No epitrochlear, axillary, anterior or posterior cervical, or supraclavicular lymphadenopathy is appreciated.  Cardiovascular: Regular rate and rhythm, ?possible holosystolic murmur  Respiratory: Coarse breath sounds, wheezing   GI: Soft, non-tender, normal bowel sounds  Genitourinary: Deferred   Musculoskeletal: Normal muscle bulk and tone.  Skin: Warm and dry, no rashes.   Neurologic: Neck supple. Cranial nerves are grossly intact.  is symmetric. CN II-XII intact    Labs  CMP  Recent Labs   Lab 07/20/21  0742 07/19/21  0623 07/18/21  0917 07/17/21  0839    140 140 140   POTASSIUM 4.0 3.7 3.8 3.6   CHLORIDE 106 105 108 109   CO2 27 27 31 26   ANIONGAP 5 8 1* 5   GLC 87 97 83 81   BUN 8 5* 5* 2*   CR 0.58 0.58 0.50* 0.50*   GFRESTIMATED >90 >90 >90 >90   MICAH 9.2 9.2 8.6 8.6   PROTTOTAL 8.1  7.3 7.0 6.9   ALBUMIN 3.8 3.4 3.3* 3.4   BILITOTAL 3.8* 3.2* 2.6* 2.6*   ALKPHOS 73 68 63 62   AST 84* 78* 70* 77*   ALT 75* 70* 67* 70*     CBC  Recent Labs   Lab 07/19/21  0623 07/16/21  0606 07/15/21  0812 07/14/21  1730   WBC 11.6* 10.4 12.6* 14.0*   RBC 2.54* 2.52* 2.38* 2.46*   HGB 8.1* 7.9* 7.5* 7.6*   HCT 23.8* 22.4* 21.5* 21.9*   MCV 94 89 90 89   MCH 31.9 31.3 31.5 30.9   MCHC 34.0 35.3 34.9 34.7   RDW 21.8* 23.5* 22.2* 20.6*    321 295 253     INRNo lab results found in last 7 days.  Arterial Blood GasNo lab results found in last 7 days.      Imaging  7/14/2021  CXR  IMPRESSION: Left internal jugular venous Port-A-Cath terminates near the superior cavoatrial junction. Mild elevation of the right hemidiaphragm. Lungs appear clear. No pleural fluid or pneumothorax. Normal heart size and pulmonary vascularity.    7/13/2021  NM Lung Scan   Impression:   1. At least one new segmental perfusion defect in the left upper lobe  apicoposterior segment on a background of known subsegmental and  segmental perfusion defects, suggestive of acute on chronic pulmonary  emboli.    2. Clear lungs on SPECT-CT.    7/13/2021  CXR  IMPRESSION:   Mildly decreased hazy bibasilar opacities, favored atelectasis versus  less likely infection.    7/13/2021  CXR 2VW  IMPRESSION:   Hazy bibasilar opacities, atelectasis versus infection.

## 2021-07-20 NOTE — PLAN OF CARE
Pt A&O, VSS on RA.  Manages her own oxymask 2L when needed.  Pt calls frequently with c/o of pain; Q1h IV dilaudid PRN. Chest port.  SBA to BSC.

## 2021-07-20 NOTE — PROGRESS NOTES
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 She  was admitted on 7/13/2021 with dyspnea and cough concerning for acute chest syndrome.     Likely cause of hypoxia secondary to acute PE despite anticoagulation with rivaroxaban. Presently, concern that patient may have some resistance to anticoagulation that targets Xa such as DOACs and potentially lovenox. At this point would trial direct thrombin inhibitor such as dabigatran.      Hematology/Oncology Problem list:   # Acute hypoxic respiratory failure secondary to PE  # Sickle Cell anemia with history of acute chest syndrome  # Right innominate vein thrombosis + 3 moderate subsegmental perfusion defects (2/1/21) on rivaroxaban     Summary of Recommendations:    Restart ASA 81mg today     Continue pain management per primary team. Anticipate discharge in am    Please ensure adequate bowel regimen.     Encourage incentive spirometry.          Thank you for involving us in the care of this patient. We will continue to follow during the hospitalization.     Patient was seen and plan of care developed with Dr. Duncan.  Akhil Gutiérrez MD   Hematology  Fellow  Pager: [phone]  _____________________________    Subjective & Interval History:    No acute events noted overnight.  States that she is feeling tired,  but improved overall. Looking forward to discharge in am.      Physical Exam:    Blood pressure 115/68, pulse 104, temperature 97.4  F  (36.3  C), temperature source Axillary, resp. rate 18, weight 77.9 kg (171 lb 11.8 oz), SpO2 92 %, not currently breastfeeding.    ENERAL: Laying in  NAD  HEENT: AT/NC,  RESP: non labored breathing on RA  SKIN: Warm and dry, no jaundice or rash on limited exam  NEURO: speech fluent, mentation intact,  PSYCH: awake , linear thought,    Labs & Studies: I personally reviewed the following studies:  ROUTINE LABS (Last four results):  CMP  Recent Labs   Lab 07/20/21  0742 07/19/21  0623 07/18/21  0917 07/17/21  0839    140 140 140   POTASSIUM 4.0 3.7 3.8 3.6   CHLORIDE 106 105 108 109   CO2 27 27 31 26   ANIONGAP 5 8 1* 5   GLC 87 97 83 81   BUN 8 5* 5* 2*   CR 0.58 0.58 0.50* 0.50*   GFRESTIMATED >90 >90 >90 >90   MICAH 9.2 9.2 8.6 8.6   PROTTOTAL 8.1 7.3 7.0 6.9   ALBUMIN 3.8 3.4 3.3* 3.4   BILITOTAL 3.8* 3.2* 2.6* 2.6*   ALKPHOS 73 68 63 62   AST 84* 78* 70* 77*   ALT 75* 70* 67* 70*     CBC  Recent Labs   Lab 07/19/21  0623 07/16/21  0606 07/15/21  0812 07/14/21  1730   WBC 11.6* 10.4 12.6* 14.0*   RBC 2.54* 2.52* 2.38* 2.46*   HGB 8.1* 7.9* 7.5* 7.6*   HCT 23.8* 22.4* 21.5* 21.9*   MCV 94 89 90 89   MCH 31.9 31.3 31.5 30.9   MCHC 34.0 35.3 34.9 34.7   RDW 21.8* 23.5* 22.2* 20.6*    321 295 253     INRNo lab results found in last 7 days.    Medications list for reference:  Current Facility-Administered Medications   Medication     0.9% sodium chloride BOLUS     acetaminophen (TYLENOL) tablet 650 mg     albuterol (PROAIR HFA/PROVENTIL HFA/VENTOLIN HFA) 108 (90 Base) MCG/ACT inhaler 2 puff     albuterol (PROVENTIL) neb solution 2.5 mg     ARIPiprazole (ABILIFY) tablet 2 mg     carbamide peroxide (DEBROX) 6.5 % otic solution 10 drop     dabigatran ANTICOAGULANT (PRADAXA) capsule 150 mg     deferasirox (JADENU) tablet 1,440 mg     diclofenac (VOLTAREN) 1 % topical gel 4 g     diphenhydrAMINE (BENADRYL) capsule 25-50 mg     fluticasone-vilanterol (BREO ELLIPTA) 200-25 MCG/INH inhaler 1 puff     heparin 100  UNIT/ML injection 5-10 mL     heparin lock flush 10 UNIT/ML injection 5-10 mL     heparin lock flush 10 UNIT/ML injection 5-10 mL     HYDROmorphone (PF) (DILAUDID) injection 0.5 mg     hydroxyurea (HYDREA) capsule 2,000 mg     hydrOXYzine (ATARAX) tablet 25 mg     lidocaine (LMX4) cream     lidocaine 1 % 0.1-1 mL     morphine (PF) injection 2-4 mg     ondansetron (ZOFRAN-ODT) ODT tab 4 mg    Or     ondansetron (ZOFRAN) injection 4 mg     oxyCODONE (ROXICODONE) tablet 15 mg     Patient is already receiving anticoagulation with heparin, enoxaparin (LOVENOX), warfarin (COUMADIN)  or other anticoagulant medication     polyethylene glycol (MIRALAX) Packet 17 g     prochlorperazine (COMPAZINE) injection 10 mg    Or     prochlorperazine (COMPAZINE) tablet 10 mg    Or     prochlorperazine (COMPAZINE) suppository 25 mg     senna-docusate (SENOKOT-S/PERICOLACE) 8.6-50 MG per tablet 1 tablet    Or     senna-docusate (SENOKOT-S/PERICOLACE) 8.6-50 MG per tablet 2 tablet     sertraline (ZOLOFT) tablet 200 mg     sodium chloride (PF) 0.9% PF flush 10-20 mL     sodium chloride (PF) 0.9% PF flush 10-20 mL     sodium chloride (PF) 0.9% PF flush 10-20 mL     sodium chloride (PF) 0.9% PF flush 3 mL     sodium chloride (PF) 0.9% PF flush 3 mL

## 2021-07-20 NOTE — PLAN OF CARE
/68 (BP Location: Left arm)   Pulse 104   Temp 97.4  F (36.3  C) (Axillary)   Resp 18   Wt 77.9 kg (171 lb 11.8 oz)   SpO2 92%   BMI 29.48 kg/m      Time 3158-7111      Reason for admission: Hb-SS disease with crisis, Acute respiratory failure with hypoxia, Pulmonary embolism  Vitals: VSS on RA while awake, 2LPM via oxymask while sleeping  Activity: Up Ind   Pain: c/o generalized pain, PO oxy given x2, IV dilaudid given x5  Neuro: A&Ox4, speech logical  Mood/Behavior: calm, cooperative  Cardiac: WNL  Respiratory: PRN neb given x2 for wheezing, ORTEGA  GI/: No BM, voiding without difficulty, IV zofran given x1 this evening w/ relief  Diet:Regular  Lines: Port  Skin: CDI      Plan: Continue to monitor and follow POC.

## 2021-07-21 ENCOUNTER — APPOINTMENT (OUTPATIENT)
Dept: GENERAL RADIOLOGY | Facility: CLINIC | Age: 21
DRG: 871 | End: 2021-07-21
Attending: INTERNAL MEDICINE
Payer: COMMERCIAL

## 2021-07-21 LAB
ALBUMIN SERPL-MCNC: 3.8 G/DL (ref 3.4–5)
ALBUMIN UR-MCNC: NEGATIVE MG/DL
ALP SERPL-CCNC: 76 U/L (ref 40–150)
ALT SERPL W P-5'-P-CCNC: 99 U/L (ref 0–50)
ANION GAP SERPL CALCULATED.3IONS-SCNC: 7 MMOL/L (ref 3–14)
APPEARANCE UR: ABNORMAL
AST SERPL W P-5'-P-CCNC: 112 U/L (ref 0–45)
BASOPHILS # BLD AUTO: 0.2 10E3/UL (ref 0–0.2)
BASOPHILS NFR BLD AUTO: 1 %
BILIRUB SERPL-MCNC: 5.2 MG/DL (ref 0.2–1.3)
BILIRUB UR QL STRIP: ABNORMAL
BUN SERPL-MCNC: 8 MG/DL (ref 7–30)
CALCIUM SERPL-MCNC: 9.2 MG/DL (ref 8.5–10.1)
CHLORIDE BLD-SCNC: 104 MMOL/L (ref 94–109)
CO2 SERPL-SCNC: 26 MMOL/L (ref 20–32)
COLOR UR AUTO: ABNORMAL
CREAT SERPL-MCNC: 0.68 MG/DL (ref 0.52–1.04)
EOSINOPHIL # BLD AUTO: 0.9 10E3/UL (ref 0–0.7)
EOSINOPHIL NFR BLD AUTO: 7 %
ERYTHROCYTE [DISTWIDTH] IN BLOOD BY AUTOMATED COUNT: 21.1 % (ref 10–15)
GFR SERPL CREATININE-BSD FRML MDRD: >90 ML/MIN/1.73M2
GLUCOSE BLD-MCNC: 96 MG/DL (ref 70–99)
GLUCOSE UR STRIP-MCNC: NEGATIVE MG/DL
HCT VFR BLD AUTO: 24.5 % (ref 35–47)
HGB BLD-MCNC: 8.3 G/DL (ref 11.7–15.7)
HGB UR QL STRIP: NEGATIVE
HOLD SPECIMEN: NORMAL
HOLD SPECIMEN: NORMAL
HYALINE CASTS: 4 /LPF
IMM GRANULOCYTES # BLD: 0.1 10E3/UL
IMM GRANULOCYTES NFR BLD: 1 %
KETONES UR STRIP-MCNC: NEGATIVE MG/DL
LACTATE SERPL-SCNC: 1 MMOL/L (ref 0.7–2)
LEUKOCYTE ESTERASE UR QL STRIP: ABNORMAL
LYMPHOCYTES # BLD AUTO: 2.7 10E3/UL (ref 0.8–5.3)
LYMPHOCYTES NFR BLD AUTO: 19 %
MCH RBC QN AUTO: 30.3 PG (ref 26.5–33)
MCHC RBC AUTO-ENTMCNC: 33.9 G/DL (ref 31.5–36.5)
MCV RBC AUTO: 89 FL (ref 78–100)
MONOCYTES # BLD AUTO: 1.2 10E3/UL (ref 0–1.3)
MONOCYTES NFR BLD AUTO: 9 %
MUCOUS THREADS #/AREA URNS LPF: PRESENT /LPF
NEUTROPHILS # BLD AUTO: 9 10E3/UL (ref 1.6–8.3)
NEUTROPHILS NFR BLD AUTO: 63 %
NITRATE UR QL: NEGATIVE
NRBC # BLD AUTO: 0.4 10E3/UL
NRBC BLD AUTO-RTO: 3 /100
PH UR STRIP: 5.5 [PH] (ref 5–7)
PLATELET # BLD AUTO: 270 10E3/UL (ref 150–450)
POTASSIUM BLD-SCNC: 3.9 MMOL/L (ref 3.4–5.3)
PROT SERPL-MCNC: 8.1 G/DL (ref 6.8–8.8)
RBC # BLD AUTO: 2.74 10E6/UL (ref 3.8–5.2)
RBC URINE: 2 /HPF
RETICS # AUTO: 0.5 10E6/UL (ref 0.03–0.1)
RETICS/RBC NFR AUTO: 18.1 % (ref 0.5–2)
SODIUM SERPL-SCNC: 137 MMOL/L (ref 133–144)
SP GR UR STRIP: 1.01 (ref 1–1.03)
SQUAMOUS EPITHELIAL: 6 /HPF
TRANSITIONAL EPI: <1 /HPF
UROBILINOGEN UR STRIP-MCNC: >12 MG/DL
WBC # BLD AUTO: 14 10E3/UL (ref 4–11)
WBC URINE: 15 /HPF

## 2021-07-21 PROCEDURE — 120N000002 HC R&B MED SURG/OB UMMC

## 2021-07-21 PROCEDURE — 81001 URINALYSIS AUTO W/SCOPE: CPT | Performed by: INTERNAL MEDICINE

## 2021-07-21 PROCEDURE — 250N000011 HC RX IP 250 OP 636: Performed by: STUDENT IN AN ORGANIZED HEALTH CARE EDUCATION/TRAINING PROGRAM

## 2021-07-21 PROCEDURE — 250N000013 HC RX MED GY IP 250 OP 250 PS 637: Performed by: PHYSICIAN ASSISTANT

## 2021-07-21 PROCEDURE — 71045 X-RAY EXAM CHEST 1 VIEW: CPT

## 2021-07-21 PROCEDURE — 85045 AUTOMATED RETICULOCYTE COUNT: CPT | Performed by: STUDENT IN AN ORGANIZED HEALTH CARE EDUCATION/TRAINING PROGRAM

## 2021-07-21 PROCEDURE — 36592 COLLECT BLOOD FROM PICC: CPT | Performed by: INTERNAL MEDICINE

## 2021-07-21 PROCEDURE — 82040 ASSAY OF SERUM ALBUMIN: CPT | Performed by: STUDENT IN AN ORGANIZED HEALTH CARE EDUCATION/TRAINING PROGRAM

## 2021-07-21 PROCEDURE — 250N000013 HC RX MED GY IP 250 OP 250 PS 637: Performed by: INTERNAL MEDICINE

## 2021-07-21 PROCEDURE — 250N000013 HC RX MED GY IP 250 OP 250 PS 637: Performed by: STUDENT IN AN ORGANIZED HEALTH CARE EDUCATION/TRAINING PROGRAM

## 2021-07-21 PROCEDURE — 83605 ASSAY OF LACTIC ACID: CPT | Performed by: INTERNAL MEDICINE

## 2021-07-21 PROCEDURE — 85025 COMPLETE CBC W/AUTO DIFF WBC: CPT | Performed by: INTERNAL MEDICINE

## 2021-07-21 PROCEDURE — 87086 URINE CULTURE/COLONY COUNT: CPT | Performed by: INTERNAL MEDICINE

## 2021-07-21 PROCEDURE — 99233 SBSQ HOSP IP/OBS HIGH 50: CPT | Performed by: INTERNAL MEDICINE

## 2021-07-21 PROCEDURE — 36591 DRAW BLOOD OFF VENOUS DEVICE: CPT | Performed by: INTERNAL MEDICINE

## 2021-07-21 PROCEDURE — 71045 X-RAY EXAM CHEST 1 VIEW: CPT | Mod: 26 | Performed by: RADIOLOGY

## 2021-07-21 RX ORDER — LEVOFLOXACIN 500 MG/1
500 TABLET, FILM COATED ORAL DAILY
Status: DISCONTINUED | OUTPATIENT
Start: 2021-07-21 | End: 2021-07-25 | Stop reason: HOSPADM

## 2021-07-21 RX ADMIN — HYDROMORPHONE HYDROCHLORIDE 0.5 MG: 1 INJECTION, SOLUTION INTRAMUSCULAR; INTRAVENOUS; SUBCUTANEOUS at 10:34

## 2021-07-21 RX ADMIN — ARIPIPRAZOLE 2 MG: 2 TABLET ORAL at 22:24

## 2021-07-21 RX ADMIN — OXYCODONE HYDROCHLORIDE 15 MG: 5 TABLET ORAL at 15:34

## 2021-07-21 RX ADMIN — HYDROMORPHONE HYDROCHLORIDE 0.5 MG: 1 INJECTION, SOLUTION INTRAMUSCULAR; INTRAVENOUS; SUBCUTANEOUS at 22:25

## 2021-07-21 RX ADMIN — HYDROMORPHONE HYDROCHLORIDE 0.5 MG: 1 INJECTION, SOLUTION INTRAMUSCULAR; INTRAVENOUS; SUBCUTANEOUS at 01:18

## 2021-07-21 RX ADMIN — SERTRALINE HYDROCHLORIDE 200 MG: 100 TABLET ORAL at 22:24

## 2021-07-21 RX ADMIN — HYDROMORPHONE HYDROCHLORIDE 0.5 MG: 1 INJECTION, SOLUTION INTRAMUSCULAR; INTRAVENOUS; SUBCUTANEOUS at 18:13

## 2021-07-21 RX ADMIN — HYDROMORPHONE HYDROCHLORIDE 0.5 MG: 1 INJECTION, SOLUTION INTRAMUSCULAR; INTRAVENOUS; SUBCUTANEOUS at 05:30

## 2021-07-21 RX ADMIN — HYDROMORPHONE HYDROCHLORIDE 0.5 MG: 1 INJECTION, SOLUTION INTRAMUSCULAR; INTRAVENOUS; SUBCUTANEOUS at 15:34

## 2021-07-21 RX ADMIN — HYDROMORPHONE HYDROCHLORIDE 0.5 MG: 1 INJECTION, SOLUTION INTRAMUSCULAR; INTRAVENOUS; SUBCUTANEOUS at 20:27

## 2021-07-21 RX ADMIN — HYDROMORPHONE HYDROCHLORIDE 0.5 MG: 1 INJECTION, SOLUTION INTRAMUSCULAR; INTRAVENOUS; SUBCUTANEOUS at 09:36

## 2021-07-21 RX ADMIN — ASPIRIN 81 MG CHEWABLE TABLET 81 MG: 81 TABLET CHEWABLE at 22:24

## 2021-07-21 RX ADMIN — HYDROMORPHONE HYDROCHLORIDE 0.5 MG: 1 INJECTION, SOLUTION INTRAMUSCULAR; INTRAVENOUS; SUBCUTANEOUS at 23:46

## 2021-07-21 RX ADMIN — LEVOFLOXACIN 500 MG: 500 TABLET, FILM COATED ORAL at 22:24

## 2021-07-21 RX ADMIN — DABIGATRAN ETEXILATE MESYLATE 150 MG: 150 CAPSULE ORAL at 09:49

## 2021-07-21 RX ADMIN — HYDROMORPHONE HYDROCHLORIDE 0.5 MG: 1 INJECTION, SOLUTION INTRAMUSCULAR; INTRAVENOUS; SUBCUTANEOUS at 08:07

## 2021-07-21 RX ADMIN — HYDROXYUREA 2000 MG: 500 CAPSULE ORAL at 22:27

## 2021-07-21 RX ADMIN — OXYCODONE HYDROCHLORIDE 15 MG: 5 TABLET ORAL at 05:28

## 2021-07-21 RX ADMIN — HYDROMORPHONE HYDROCHLORIDE 0.5 MG: 1 INJECTION, SOLUTION INTRAMUSCULAR; INTRAVENOUS; SUBCUTANEOUS at 17:01

## 2021-07-21 RX ADMIN — OXYCODONE HYDROCHLORIDE 15 MG: 5 TABLET ORAL at 20:26

## 2021-07-21 RX ADMIN — DABIGATRAN ETEXILATE MESYLATE 150 MG: 150 CAPSULE ORAL at 22:24

## 2021-07-21 RX ADMIN — HYDROMORPHONE HYDROCHLORIDE 0.5 MG: 1 INJECTION, SOLUTION INTRAMUSCULAR; INTRAVENOUS; SUBCUTANEOUS at 12:55

## 2021-07-21 RX ADMIN — OXYCODONE HYDROCHLORIDE 15 MG: 5 TABLET ORAL at 10:34

## 2021-07-21 ASSESSMENT — ACTIVITIES OF DAILY LIVING (ADL)
ADLS_ACUITY_SCORE: 16
ADLS_ACUITY_SCORE: 13
ADLS_ACUITY_SCORE: 16
ADLS_ACUITY_SCORE: 16
ADLS_ACUITY_SCORE: 15
ADLS_ACUITY_SCORE: 13

## 2021-07-21 NOTE — PLAN OF CARE
"1900 - 0730:     VSS on RA while awake. Pt utilizing 2L O2 via Oxymask while sleeping. Continuous pulse ox on. A&Ox4. Pleasant. Independent to BSC. L chest port WNL - saline locked. Pt complaining of generalized pain. PRN Oxycodone, IV Dilaudid, and IV Morphine given. Frequent congested cough - pt complaining of SOB and wheezing - PRN neb given x 1 with improvement. Pt reports coughing up sputum after neb treatment. Regular diet. Tolerating well. Voiding WNL. Pt denies BM this shift - pt reports LBM \"yesterday or the day before\". Bowel meds given.   "

## 2021-07-21 NOTE — PROGRESS NOTES
This is a recent snapshot of the patient's [facility] medical record.  For current drug dose and complete information and questions, call [phone] or In Basket pool, [facility] ([id number])  CSN Number:  [id number]

## 2021-07-21 NOTE — PROGRESS NOTES
Physician Attestation     I, Suraj Aguillon, saw and evaluated Jennifer Cervantes as part of a shared visit.  I have reviewed and discussed with the advanced practice provider/student their history, physical and plan.     I personally reviewed the vital signs, medications, labs and imaging.     My key history or physical exam findings: Patient seen and examined today currently feels slightly improved compared to yesterday.  Had an episode of tachycardia and tachypnea with associated hypoxia in the AM, however she felt okay.     PE:   VS: Afebrile and stable  GEN: NAD  CV: RRR S1/S2, no m,r,g  Pulm: Slightly increased work of breathing, diffuse inspiratory and expiratory wheezes, basilar crackles.   Abd: soft, NT, ND, +BS  Ext: warm and well perfused, no edema     Key management decisions made by me: Pt is a 21 year old with Sickle cell anemia, multiple thrombotic complications presents with acute hypoxic respiratory failure secondary to acute PE, acute pain crisis, and asthma exacerbation.  Given persistence of hypoxia, worsening WBC, cough, and meeting SIRS criteria will start levaquin to cover a full course for pneumonia (suspect atypical).  Bilirubin rise is concerning for active sickle cell disease, which wouldn't be surprising given ongoing transient hypoxia.  Will continue pain regimen where it is, but monitor closely. Low suspicion for acute chest at this time.       Suraj Aguillon  Date of Service (when I saw the patient):7/21/21      INTERNAL MEDICINE   DAILY PROGRESS NOTE  Gold 8      Jennifer Cervantes ([id number])   Admission Date: 7/13/2021  Date of Service: 07/21/2021      Changes Today     Continue:   PRN oral oxycodone 15 mg every 4 hours   PRN IV Dilaudid 0.5 every hour   PRN tylenol 650 mg every 6 hours  Urinalysis  CXR  Levaquin 500mg   Encouraged ambulation, diet advancement and use of bowel meds    Assessment & Plan   Jennifer Cervantes is a 21 year old female admitted on 7/13/2021. She has a  past medical history significant for sickle cell anemia, history of stroke with right upper extremity hemiparesis, venous thrombosis/PE on xarelto, anxiety, depression, and asthma who presented to the ED from infusion clinic after found to be hypoxic and complains of myalgias, shortness of breath, cough and rhinorrhea. She is admitted to Internal Medicine for further work-up and management of acute on chronic PE.    # Acute hypoxic respiratory failure  # Acute on chronic pulmonary embolism  # Possible Asthma exacerbation/ Pneumonia   Presenting with myalgias, SOB, cough and rhinorrhea. Afebrile. Noted to be hypoxic to 80s at infusion clinic. Has history of acute chest syndrome requiring exchange transfusions as well as previous PE while on AC. NM scan this admission with new acute PE as well. 7/21, pt triggered sepsis protocol and has new onset cough and increased oxygen needs concern for pneumonia  - Levaquin  - CXR  - Continue dabigatran   - Appreciate Heme Consult      # Sickle cell anemia with pain crisis  Concern for acute chest as above. Did get one unit red cells this admisstion. Has had consistent pain this admission  - Hematology consult appreciated  - Continue PTA oxycontin, prn oxycodone  - Capnography, pulse oximetry  - Continue pta hydea, jadenu     Hypokalemia (resolved)  Potassium low in setting of poor appetite today while feeling unwell.  - K replacement protocol     Asthma  PTA on symbicort, albuterol. Has used inhaler this week with no improvement in symptoms.  - Continue pta symbicort BID  - PRN albuterol HFA inhaler or nebulizer  - PRN nebs changed from 6 hrs to 2 hours     Hx of PE, RUE DVT - Dabigatran  Hx of CVA with residual RUE deficits - Remote history of CVA in 2015  Anxiety/Depression - Continue pta zoloft 200 mg daily, abilify 2 mg daily     Diet: Combination Diet Regular Diet Adult    DVT Prophylaxis: dabigatran  Lopez Catheter: Not present  Central Lines: None  Code Status: Full  Code    Seen and discussed with my attending physician,  ,who agrees with above assessment and plan.      Tati Mcclendon MS4      Subjective     Pt feeling similar to previous days. States her pain is somewhat well controlled but dosing often gets off when she sleeps. Then has break through. She would still like to be allowed to ask for it instead of scheduling. Pt also states her breathing is ok but she wakes up with the mask on, as her stats drop when she sleeps    Pt states she is having some severe left flank pain and possibly an increase in urination (but not super sure).     Pt having new on set cough when revisited and triggered sepsis protocol.     Denies n/v or chills/fever    Nursing note was reviewed, no acute event overnight.    ROS negative except above.    Objective   Most recent vital signs:  /64 (BP Location: Left arm)   Pulse 107   Temp 98  F (36.7  C) (Oral)   Resp 20   Wt 77.9 kg (171 lb 11.8 oz)   SpO2 92%   BMI 29.48 kg/m    Temp:  [97.4  F (36.3  C)-98.9  F (37.2  C)] 98  F (36.7  C)  Pulse:  [102-114] 107  Resp:  [18-20] 20  BP: (115-136)/(64-79) 122/64  SpO2:  [88 %-95 %] 92 %  Wt Readings from Last 2 Encounters:   07/19/21 77.9 kg (171 lb 11.8 oz)   07/12/21 77.1 kg (170 lb)       Intake/Output Summary (Last 24 hours) at 7/20/2021 0905  Last data filed at 7/20/2021 0738  Gross per 24 hour   Intake 242 ml   Output 2400 ml   Net -2158 ml       Physical exam:  General: Alert, oriented, cooperative, no apparent distress,   Eyes: Eyes are clear, pupils are reactive, no scleral icterus.  EOMI  HENT: Oropharynx is clear and moist. No evidence of cranial trauma.  Lymph/Hematologic: No epitrochlear, axillary, anterior or posterior cervical, or supraclavicular lymphadenopathy is appreciated.  Cardiovascular: Regular rate and rhythm, no murmur  Respiratory: Coarse breath sounds, wheezing   GI: Soft, non-tender, normal bowel sounds  Genitourinary: Deferred   Musculoskeletal: Normal  muscle bulk and tone. Pt having some left flank pain  Skin: Warm and dry, no rashes.   Neurologic: Neck supple. Cranial nerves are grossly intact.  is symmetric. CN II-XII intact    Labs  CMP  Recent Labs   Lab 07/21/21  0701 07/20/21  0742 07/19/21  0623 07/18/21  0917    138 140 140   POTASSIUM 3.9 4.0 3.7 3.8   CHLORIDE 104 106 105 108   CO2 26 27 27 31   ANIONGAP 7 5 8 1*   GLC 96 87 97 83   BUN 8 8 5* 5*   CR 0.68 0.58 0.58 0.50*   GFRESTIMATED >90 >90 >90 >90   MICAH 9.2 9.2 9.2 8.6   PROTTOTAL 8.1 8.1 7.3 7.0   ALBUMIN 3.8 3.8 3.4 3.3*   BILITOTAL 5.2* 3.8* 3.2* 2.6*   ALKPHOS 76 73 68 63   * 84* 78* 70*   ALT 99* 75* 70* 67*     CBC  Recent Labs   Lab 07/19/21  0623 07/16/21  0606 07/15/21  0812 07/14/21  1730   WBC 11.6* 10.4 12.6* 14.0*   RBC 2.54* 2.52* 2.38* 2.46*   HGB 8.1* 7.9* 7.5* 7.6*   HCT 23.8* 22.4* 21.5* 21.9*   MCV 94 89 90 89   MCH 31.9 31.3 31.5 30.9   MCHC 34.0 35.3 34.9 34.7   RDW 21.8* 23.5* 22.2* 20.6*    321 295 253     INRNo lab results found in last 7 days.  Arterial Blood GasNo lab results found in last 7 days.      Imaging  7/14/2021  CXR  IMPRESSION: Left internal jugular venous Port-A-Cath terminates near the superior cavoatrial junction. Mild elevation of the right hemidiaphragm. Lungs appear clear. No pleural fluid or pneumothorax. Normal heart size and pulmonary vascularity.    7/13/2021  NM Lung Scan   Impression:   1. At least one new segmental perfusion defect in the left upper lobe  apicoposterior segment on a background of known subsegmental and  segmental perfusion defects, suggestive of acute on chronic pulmonary  emboli.    2. Clear lungs on SPECT-CT.    7/13/2021  CXR  IMPRESSION:   Mildly decreased hazy bibasilar opacities, favored atelectasis versus  less likely infection.    7/13/2021  CXR 2VW  IMPRESSION:   Hazy bibasilar opacities, atelectasis versus infection.

## 2021-07-21 NOTE — PLAN OF CARE
BP (!) 119/93 (BP Location: Right arm)   Pulse 113   Temp 99.9  F (37.7  C) (Oral)   Resp 18   Wt 77.9 kg (171 lb 11.8 oz)   SpO2 91%   BMI 29.48 kg/m      Time 0299-6458      Reason for admission: Hb-SS disease with crisis, Acute respiratory failure with hypoxia, Pulmonary embolism  Vitals: VSS on RA while awake, 2LPM via oxymask while sleeping  Activity: Up Ind   Pain: c/o generalized pain, PO oxy given x2, IV dilaudid given x6  Neuro: A&Ox4, speech logical  Mood/Behavior: calm, cooperative  Cardiac: WNL  Respiratory: Frequent cough  GI/: No BM, voiding without difficulty  Diet:Regular  Lines: Port  Skin: CDI      Plan: Continue to monitor and follow POC.

## 2021-07-22 LAB
ALBUMIN SERPL-MCNC: 3.6 G/DL (ref 3.4–5)
ALP SERPL-CCNC: 78 U/L (ref 40–150)
ALT SERPL W P-5'-P-CCNC: 103 U/L (ref 0–50)
ANION GAP SERPL CALCULATED.3IONS-SCNC: 3 MMOL/L (ref 3–14)
AST SERPL W P-5'-P-CCNC: 124 U/L (ref 0–45)
BACTERIA UR CULT: NORMAL
BACTERIA UR CULT: NORMAL
BILIRUB SERPL-MCNC: 4.7 MG/DL (ref 0.2–1.3)
BUN SERPL-MCNC: 7 MG/DL (ref 7–30)
CALCIUM SERPL-MCNC: 8.8 MG/DL (ref 8.5–10.1)
CHLORIDE BLD-SCNC: 104 MMOL/L (ref 94–109)
CO2 SERPL-SCNC: 29 MMOL/L (ref 20–32)
CREAT SERPL-MCNC: 0.56 MG/DL (ref 0.52–1.04)
ERYTHROCYTE [DISTWIDTH] IN BLOOD BY AUTOMATED COUNT: 23 % (ref 10–15)
GFR SERPL CREATININE-BSD FRML MDRD: >90 ML/MIN/1.73M2
GLUCOSE BLD-MCNC: 91 MG/DL (ref 70–99)
HCT VFR BLD AUTO: 22.2 % (ref 35–47)
HGB BLD-MCNC: 7.6 G/DL (ref 11.7–15.7)
LACTATE SERPL-SCNC: 0.8 MMOL/L (ref 0.7–2)
MCH RBC QN AUTO: 31 PG (ref 26.5–33)
MCHC RBC AUTO-ENTMCNC: 34.2 G/DL (ref 31.5–36.5)
MCV RBC AUTO: 91 FL (ref 78–100)
PLATELET # BLD AUTO: 262 10E3/UL (ref 150–450)
POTASSIUM BLD-SCNC: 3.9 MMOL/L (ref 3.4–5.3)
PROT SERPL-MCNC: 7.8 G/DL (ref 6.8–8.8)
RBC # BLD AUTO: 2.45 10E6/UL (ref 3.8–5.2)
RETICS # AUTO: 0.46 10E6/UL (ref 0.03–0.1)
RETICS/RBC NFR AUTO: 18.7 % (ref 0.5–2)
SODIUM SERPL-SCNC: 136 MMOL/L (ref 133–144)
WBC # BLD AUTO: 15.1 10E3/UL (ref 4–11)

## 2021-07-22 PROCEDURE — 82374 ASSAY BLOOD CARBON DIOXIDE: CPT | Performed by: STUDENT IN AN ORGANIZED HEALTH CARE EDUCATION/TRAINING PROGRAM

## 2021-07-22 PROCEDURE — 83605 ASSAY OF LACTIC ACID: CPT | Performed by: INTERNAL MEDICINE

## 2021-07-22 PROCEDURE — 84155 ASSAY OF PROTEIN SERUM: CPT | Performed by: STUDENT IN AN ORGANIZED HEALTH CARE EDUCATION/TRAINING PROGRAM

## 2021-07-22 PROCEDURE — 250N000013 HC RX MED GY IP 250 OP 250 PS 637: Performed by: INTERNAL MEDICINE

## 2021-07-22 PROCEDURE — 250N000011 HC RX IP 250 OP 636: Performed by: STUDENT IN AN ORGANIZED HEALTH CARE EDUCATION/TRAINING PROGRAM

## 2021-07-22 PROCEDURE — 85027 COMPLETE CBC AUTOMATED: CPT | Performed by: PHYSICIAN ASSISTANT

## 2021-07-22 PROCEDURE — 85045 AUTOMATED RETICULOCYTE COUNT: CPT | Performed by: STUDENT IN AN ORGANIZED HEALTH CARE EDUCATION/TRAINING PROGRAM

## 2021-07-22 PROCEDURE — 99233 SBSQ HOSP IP/OBS HIGH 50: CPT | Performed by: INTERNAL MEDICINE

## 2021-07-22 PROCEDURE — 250N000013 HC RX MED GY IP 250 OP 250 PS 637: Performed by: STUDENT IN AN ORGANIZED HEALTH CARE EDUCATION/TRAINING PROGRAM

## 2021-07-22 PROCEDURE — 36592 COLLECT BLOOD FROM PICC: CPT | Performed by: INTERNAL MEDICINE

## 2021-07-22 PROCEDURE — 250N000013 HC RX MED GY IP 250 OP 250 PS 637: Performed by: PHYSICIAN ASSISTANT

## 2021-07-22 PROCEDURE — 36592 COLLECT BLOOD FROM PICC: CPT | Performed by: PHYSICIAN ASSISTANT

## 2021-07-22 PROCEDURE — 120N000002 HC R&B MED SURG/OB UMMC

## 2021-07-22 RX ADMIN — Medication 5 ML: at 07:57

## 2021-07-22 RX ADMIN — HYDROMORPHONE HYDROCHLORIDE 0.5 MG: 1 INJECTION, SOLUTION INTRAMUSCULAR; INTRAVENOUS; SUBCUTANEOUS at 22:14

## 2021-07-22 RX ADMIN — HYDROMORPHONE HYDROCHLORIDE 0.5 MG: 1 INJECTION, SOLUTION INTRAMUSCULAR; INTRAVENOUS; SUBCUTANEOUS at 14:44

## 2021-07-22 RX ADMIN — SERTRALINE HYDROCHLORIDE 200 MG: 100 TABLET ORAL at 21:09

## 2021-07-22 RX ADMIN — OXYCODONE HYDROCHLORIDE 15 MG: 5 TABLET ORAL at 07:29

## 2021-07-22 RX ADMIN — HYDROMORPHONE HYDROCHLORIDE 0.5 MG: 1 INJECTION, SOLUTION INTRAMUSCULAR; INTRAVENOUS; SUBCUTANEOUS at 17:35

## 2021-07-22 RX ADMIN — HYDROMORPHONE HYDROCHLORIDE 0.5 MG: 1 INJECTION, SOLUTION INTRAMUSCULAR; INTRAVENOUS; SUBCUTANEOUS at 13:27

## 2021-07-22 RX ADMIN — HYDROMORPHONE HYDROCHLORIDE 0.5 MG: 1 INJECTION, SOLUTION INTRAMUSCULAR; INTRAVENOUS; SUBCUTANEOUS at 20:05

## 2021-07-22 RX ADMIN — HYDROMORPHONE HYDROCHLORIDE 0.5 MG: 1 INJECTION, SOLUTION INTRAMUSCULAR; INTRAVENOUS; SUBCUTANEOUS at 04:31

## 2021-07-22 RX ADMIN — ASPIRIN 81 MG CHEWABLE TABLET 81 MG: 81 TABLET CHEWABLE at 21:08

## 2021-07-22 RX ADMIN — HYDROXYUREA 2000 MG: 500 CAPSULE ORAL at 21:08

## 2021-07-22 RX ADMIN — HYDROMORPHONE HYDROCHLORIDE 0.5 MG: 1 INJECTION, SOLUTION INTRAMUSCULAR; INTRAVENOUS; SUBCUTANEOUS at 05:50

## 2021-07-22 RX ADMIN — OXYCODONE HYDROCHLORIDE 15 MG: 5 TABLET ORAL at 16:08

## 2021-07-22 RX ADMIN — HYDROMORPHONE HYDROCHLORIDE 0.5 MG: 1 INJECTION, SOLUTION INTRAMUSCULAR; INTRAVENOUS; SUBCUTANEOUS at 21:09

## 2021-07-22 RX ADMIN — HYDROMORPHONE HYDROCHLORIDE 0.5 MG: 1 INJECTION, SOLUTION INTRAMUSCULAR; INTRAVENOUS; SUBCUTANEOUS at 18:30

## 2021-07-22 RX ADMIN — DABIGATRAN ETEXILATE MESYLATE 150 MG: 150 CAPSULE ORAL at 10:59

## 2021-07-22 RX ADMIN — HYDROMORPHONE HYDROCHLORIDE 0.5 MG: 1 INJECTION, SOLUTION INTRAMUSCULAR; INTRAVENOUS; SUBCUTANEOUS at 10:58

## 2021-07-22 RX ADMIN — HYDROMORPHONE HYDROCHLORIDE 0.5 MG: 1 INJECTION, SOLUTION INTRAMUSCULAR; INTRAVENOUS; SUBCUTANEOUS at 23:19

## 2021-07-22 RX ADMIN — HYDROMORPHONE HYDROCHLORIDE 0.5 MG: 1 INJECTION, SOLUTION INTRAMUSCULAR; INTRAVENOUS; SUBCUTANEOUS at 07:29

## 2021-07-22 RX ADMIN — LEVOFLOXACIN 500 MG: 500 TABLET, FILM COATED ORAL at 21:08

## 2021-07-22 RX ADMIN — DOCUSATE SODIUM 50 MG AND SENNOSIDES 8.6 MG 2 TABLET: 8.6; 5 TABLET, FILM COATED ORAL at 21:13

## 2021-07-22 RX ADMIN — HYDROMORPHONE HYDROCHLORIDE 0.5 MG: 1 INJECTION, SOLUTION INTRAMUSCULAR; INTRAVENOUS; SUBCUTANEOUS at 03:02

## 2021-07-22 RX ADMIN — HYDROMORPHONE HYDROCHLORIDE 0.5 MG: 1 INJECTION, SOLUTION INTRAMUSCULAR; INTRAVENOUS; SUBCUTANEOUS at 16:08

## 2021-07-22 RX ADMIN — ARIPIPRAZOLE 2 MG: 2 TABLET ORAL at 21:09

## 2021-07-22 RX ADMIN — DABIGATRAN ETEXILATE MESYLATE 150 MG: 150 CAPSULE ORAL at 23:19

## 2021-07-22 RX ADMIN — OXYCODONE HYDROCHLORIDE 15 MG: 5 TABLET ORAL at 12:13

## 2021-07-22 RX ADMIN — OXYCODONE HYDROCHLORIDE 15 MG: 5 TABLET ORAL at 21:08

## 2021-07-22 RX ADMIN — HYDROMORPHONE HYDROCHLORIDE 0.5 MG: 1 INJECTION, SOLUTION INTRAMUSCULAR; INTRAVENOUS; SUBCUTANEOUS at 12:13

## 2021-07-22 ASSESSMENT — ACTIVITIES OF DAILY LIVING (ADL)
ADLS_ACUITY_SCORE: 13

## 2021-07-22 NOTE — PROGRESS NOTES
[facility]    Medicine Progress Note - Hospitalist Service, Gold 8       Date of Admission:  7/13/2021    Assessment & Plan       Jennifer Cervantes is a 21 year old female admitted on 7/13/2021. She has a past medical history significant for sickle cell anemia, history of stroke with right upper extremity hemiparesis, venous thrombosis/PE on xarelto, anxiety, depression, and asthma who presented to the ED from infusion clinic after found to be hypoxic and complains of myalgias, shortness of breath, cough and rhinorrhea. She is admitted to Internal Medicine for further work-up and management of acute on chronic PE.     # Acute hypoxic respiratory failure  # Acute on chronic pulmonary embolism  # Acute Asthma exacerbation   Sepsis due to Likely due to atypical pneumonia, resolving: Diagnosis based on HR > 90 bpm, RR > 20 breaths/min and WBC > 12 due to infection.    Presenting with myalgias, SOB, cough and rhinorrhea. Afebrile. Noted to be hypoxic to 80s at infusion clinic. Incidentally found to have a PE on V/Q scan.  Was switched from xarelto to dabigatran.  Improved initially, however is intermittently hypoxic which predisposes her to ongoing sickling.  Started back on levaquin yesterday in the context of SIRS positivity and persistent hypoxia   - Levaquin for 7 days   - Wean O2   - Ambulatory pulse ox today   - Low probability for acute chest.   - Continue pta symbicort BID  - PRN albuterol  Nebulizer q2 hours     # Sickle cell anemia with pain crisis  Concern for acute chest as above. Did get one unit red cells this admisstion. Has had consistent pain this admission, likely exacerbated by intermittent hypoxia   - Hematology consult appreciated  - Continue PTA oxycontin, prn oxycodone, dilaudid for breakthrough  - Continue pta hydea, jadenu  - Bilirubin elevated yesterday, with decreased rbc today, likely due to ongoing infection/sepsis, will trend daily     Hypokalemia  (resolved)  Potassium low in setting of poor appetite today while feeling unwell.  - K replacement protocol     Hx of PE, RUE DVT - Dabigatran  Hx of CVA with residual RUE deficits - Remote history of CVA in 2015, continue asa  Anxiety/Depression - Continue pta zoloft 200 mg daily, abilify 2 mg daily        Diet: Combination Diet Regular Diet Adult    DVT Prophylaxis: Dabigatran   Lopez Catheter: Not present  Central Lines: None  Code Status: Full Code      Disposition Plan   Expected discharge: 07/24/2021 recommended to prior living arrangement once antibiotic plan established and O2 use less than 1 liters/minute.     The patient's care was discussed with the Bedside Nurse, Care Coordinator/ and Patient.    Suraj Aguillon MD  Hospitalist Service, [address]  Securely message with the Vocera Web Console (learn more here)  Text page via [employer] Paging/Directory  Please see sign in/sign out for up to date coverage information    Risk Factors Present on Admission                ______________________________________________________________________    Interval History   Patient seen and examined, no acute overnight events.  Still with dyspnea on exertion and mild cough, but non productive.  Still with some right flank pain.  No HA, dizziness, chest pain, fevers, N/V, abdominal pain or other symptoms.     Data reviewed today: I reviewed all medications, new labs and imaging results over the last 24 hours. I personally reviewed no images or EKG's today.    Physical Exam   Vital Signs: Temp: 97.6  F (36.4  C) Temp src: Oral BP: 138/88 Pulse: 113   Resp: 20 SpO2: 99 % O2 Device: Oxymask Oxygen Delivery: 2 LPM  Weight: 171 lbs 11.81 oz  General Appearance: NAD  Respiratory: mild expiratory wheezes,   Cardiovascular: tachy, regular, S1/S2, no m,r,g  GI: soft, NT, ND, +BS  Skin: no rashes  Other: No edema.     Data   Recent Labs   Lab 07/22/21  0800  07/21/21  0701 07/20/21  0742 07/19/21  0623   WBC 15.1* 14.0*  --  11.6*   HGB 7.6* 8.3*  --  8.1*   MCV 91 89  --  94    270  --  305    137 138 140   POTASSIUM 3.9 3.9 4.0 3.7   CHLORIDE 104 104 106 105   CO2 29 26 27 27   BUN 7 8 8 5*   CR 0.56 0.68 0.58 0.58   ANIONGAP 3 7 5 8   MICAH 8.8 9.2 9.2 9.2   GLC 91 96 87 97   ALBUMIN 3.6 3.8 3.8 3.4   PROTTOTAL 7.8 8.1 8.1 7.3   BILITOTAL 4.7* 5.2* 3.8* 3.2*   ALKPHOS 78 76 73 68   * 99* 75* 70*   * 112* 84* 78*     Recent Results (from the past 24 hour(s))   XR Chest Port 1 View    Narrative    EXAM: XR CHEST PORT 1 VIEW 7/21/2021 2:50 PM      HISTORY: Pt new cough, increased oxygen and white count.    COMPARISON: Chest radiograph 7/20/2021.     TECHNIQUE: Frontal view of the chest.    FINDINGS: Frontal radiograph of the chest. Port-A-Cath left chest wall  with tip projecting over the lower SVC, similar to prior. Trachea is  midline. Cardiac silhouette is within normal limits.  Pulmonary  vasculature is distinct.  Unchanged mild bilateral perihilar  opacities. No visualized pleural effusion. No appreciable  pneumothorax. No acute osseous abnormality.       Impression    IMPRESSION: _  No acute focal airspace opacities. Unchanged mild bilateral perihilar  opacities, could represent atelectasis versus pulmonary edema.    I have personally reviewed the examination and initial interpretation  and I agree with the findings.    KEN BARBOUR DO         SYSTEM ID:  [id number]     Medications     - MEDICATION INSTRUCTIONS -         ARIPiprazole  2 mg Oral Daily     aspirin  81 mg Oral Daily     carbamide peroxide  10 drop Right Ear BID     dabigatran ANTICOAGULANT  150 mg Oral BID     fluticasone-vilanterol  1 puff Inhalation Daily     heparin  5-10 mL Intracatheter Q28 Days     heparin lock flush  5-10 mL Intracatheter Q24H     hydroxyurea  2,000 mg Oral Daily     levofloxacin  500 mg Oral Daily     polyethylene glycol  17 g Oral Daily      senna-docusate  1 tablet Oral BID    Or     senna-docusate  2 tablet Oral BID     sertraline  200 mg Oral Daily     sodium chloride (PF)  10-20 mL Intracatheter Q28 Days     sodium chloride (PF)  3 mL Intracatheter Q8H

## 2021-07-22 NOTE — PLAN OF CARE
"1900 - 0730:     VSS on RA while awake. Pt utilizing 2L O2 via Oxymask while sleeping - sats drop to 86%. Pt occasionally refusing O2 when sats 87/88 stating \"I'm not short of breath, I don't feel like I need it\". Continuous pulse ox on. A&Ox4. Labile mood. Independent to BSC. L chest port WNL - saline locked. Pt complaining of generalized pain. PRN Oxycodone and IV Dilaudid given. Frequent congested cough - pt denies SOB. Regular diet. Tolerating well. Voiding WNL. Pt denies BM this shift. Bowel meds continued. Possible discharge today (7/22)?  "

## 2021-07-23 LAB
ALBUMIN SERPL-MCNC: 3.7 G/DL (ref 3.4–5)
ALBUMIN SERPL-MCNC: 3.8 G/DL (ref 3.4–5)
ALP SERPL-CCNC: 79 U/L (ref 40–150)
ALP SERPL-CCNC: 80 U/L (ref 40–150)
ALT SERPL W P-5'-P-CCNC: 95 U/L (ref 0–50)
ALT SERPL W P-5'-P-CCNC: 98 U/L (ref 0–50)
ANION GAP SERPL CALCULATED.3IONS-SCNC: 6 MMOL/L (ref 3–14)
ANION GAP SERPL CALCULATED.3IONS-SCNC: 7 MMOL/L (ref 3–14)
AST SERPL W P-5'-P-CCNC: 120 U/L (ref 0–45)
AST SERPL W P-5'-P-CCNC: 125 U/L (ref 0–45)
BASOPHILS # BLD MANUAL: 0.2 10E3/UL (ref 0–0.2)
BASOPHILS NFR BLD MANUAL: 1 %
BILIRUB SERPL-MCNC: 3.8 MG/DL (ref 0.2–1.3)
BILIRUB SERPL-MCNC: 4.1 MG/DL (ref 0.2–1.3)
BUN SERPL-MCNC: 5 MG/DL (ref 7–30)
BUN SERPL-MCNC: 6 MG/DL (ref 7–30)
CALCIUM SERPL-MCNC: 8.8 MG/DL (ref 8.5–10.1)
CALCIUM SERPL-MCNC: 9 MG/DL (ref 8.5–10.1)
CHLORIDE BLD-SCNC: 105 MMOL/L (ref 94–109)
CHLORIDE BLD-SCNC: 106 MMOL/L (ref 94–109)
CO2 SERPL-SCNC: 25 MMOL/L (ref 20–32)
CO2 SERPL-SCNC: 26 MMOL/L (ref 20–32)
CREAT SERPL-MCNC: 0.58 MG/DL (ref 0.52–1.04)
CREAT SERPL-MCNC: 0.61 MG/DL (ref 0.52–1.04)
EOSINOPHIL # BLD MANUAL: 1.7 10E3/UL (ref 0–0.7)
EOSINOPHIL NFR BLD MANUAL: 11 %
ERYTHROCYTE [DISTWIDTH] IN BLOOD BY AUTOMATED COUNT: 25.1 % (ref 10–15)
ERYTHROCYTE [DISTWIDTH] IN BLOOD BY AUTOMATED COUNT: 26.5 % (ref 10–15)
GFR SERPL CREATININE-BSD FRML MDRD: >90 ML/MIN/1.73M2
GFR SERPL CREATININE-BSD FRML MDRD: >90 ML/MIN/1.73M2
GLUCOSE BLD-MCNC: 90 MG/DL (ref 70–99)
GLUCOSE BLD-MCNC: 96 MG/DL (ref 70–99)
HCT VFR BLD AUTO: 22.3 % (ref 35–47)
HCT VFR BLD AUTO: 22.8 % (ref 35–47)
HGB BLD-MCNC: 7.7 G/DL (ref 11.7–15.7)
HGB BLD-MCNC: 7.9 G/DL (ref 11.7–15.7)
LACTATE SERPL-SCNC: 1.1 MMOL/L (ref 0.7–2)
LYMPHOCYTES # BLD MANUAL: 2.1 10E3/UL (ref 0.8–5.3)
LYMPHOCYTES NFR BLD MANUAL: 14 %
MCH RBC QN AUTO: 30.8 PG (ref 26.5–33)
MCH RBC QN AUTO: 31.3 PG (ref 26.5–33)
MCHC RBC AUTO-ENTMCNC: 33.8 G/DL (ref 31.5–36.5)
MCHC RBC AUTO-ENTMCNC: 35.4 G/DL (ref 31.5–36.5)
MCV RBC AUTO: 89 FL (ref 78–100)
MCV RBC AUTO: 91 FL (ref 78–100)
MONOCYTES # BLD MANUAL: 1.1 10E3/UL (ref 0–1.3)
MONOCYTES NFR BLD MANUAL: 7 %
MYELOCYTES # BLD MANUAL: 0.2 10E3/UL
MYELOCYTES NFR BLD MANUAL: 1 %
NEUTROPHILS # BLD MANUAL: 10.1 10E3/UL (ref 1.6–8.3)
NEUTROPHILS NFR BLD MANUAL: 66 %
NRBC # BLD AUTO: 2.4 10E3/UL
NRBC BLD MANUAL-RTO: 16 %
PLAT MORPH BLD: ABNORMAL
PLATELET # BLD AUTO: 250 10E3/UL (ref 150–450)
PLATELET # BLD AUTO: 344 10E3/UL (ref 150–450)
POLYCHROMASIA BLD QL SMEAR: ABNORMAL
POTASSIUM BLD-SCNC: 3.5 MMOL/L (ref 3.4–5.3)
POTASSIUM BLD-SCNC: 4 MMOL/L (ref 3.4–5.3)
PROT SERPL-MCNC: 7.7 G/DL (ref 6.8–8.8)
PROT SERPL-MCNC: 7.8 G/DL (ref 6.8–8.8)
RBC # BLD AUTO: 2.5 10E6/UL (ref 3.8–5.2)
RBC # BLD AUTO: 2.52 10E6/UL (ref 3.8–5.2)
RBC MORPH BLD: ABNORMAL
RETICS # AUTO: 0.53 10E6/UL (ref 0.03–0.1)
RETICS/RBC NFR AUTO: 21.2 % (ref 0.5–2)
SICKLE CELLS BLD QL SMEAR: ABNORMAL
SODIUM SERPL-SCNC: 137 MMOL/L (ref 133–144)
SODIUM SERPL-SCNC: 138 MMOL/L (ref 133–144)
WBC # BLD AUTO: 14.5 10E3/UL (ref 4–11)
WBC # BLD AUTO: 15.3 10E3/UL (ref 4–11)

## 2021-07-23 PROCEDURE — 84155 ASSAY OF PROTEIN SERUM: CPT | Performed by: PHYSICIAN ASSISTANT

## 2021-07-23 PROCEDURE — 250N000011 HC RX IP 250 OP 636: Performed by: PHYSICIAN ASSISTANT

## 2021-07-23 PROCEDURE — 87591 N.GONORRHOEAE DNA AMP PROB: CPT | Performed by: PHYSICIAN ASSISTANT

## 2021-07-23 PROCEDURE — 84450 TRANSFERASE (AST) (SGOT): CPT | Performed by: PHYSICIAN ASSISTANT

## 2021-07-23 PROCEDURE — 250N000013 HC RX MED GY IP 250 OP 250 PS 637: Performed by: INTERNAL MEDICINE

## 2021-07-23 PROCEDURE — 120N000002 HC R&B MED SURG/OB UMMC

## 2021-07-23 PROCEDURE — 85045 AUTOMATED RETICULOCYTE COUNT: CPT | Performed by: STUDENT IN AN ORGANIZED HEALTH CARE EDUCATION/TRAINING PROGRAM

## 2021-07-23 PROCEDURE — 36592 COLLECT BLOOD FROM PICC: CPT | Performed by: STUDENT IN AN ORGANIZED HEALTH CARE EDUCATION/TRAINING PROGRAM

## 2021-07-23 PROCEDURE — 250N000009 HC RX 250: Performed by: INTERNAL MEDICINE

## 2021-07-23 PROCEDURE — 82040 ASSAY OF SERUM ALBUMIN: CPT | Performed by: STUDENT IN AN ORGANIZED HEALTH CARE EDUCATION/TRAINING PROGRAM

## 2021-07-23 PROCEDURE — 83605 ASSAY OF LACTIC ACID: CPT | Performed by: INTERNAL MEDICINE

## 2021-07-23 PROCEDURE — 250N000013 HC RX MED GY IP 250 OP 250 PS 637: Performed by: STUDENT IN AN ORGANIZED HEALTH CARE EDUCATION/TRAINING PROGRAM

## 2021-07-23 PROCEDURE — 85014 HEMATOCRIT: CPT | Performed by: INTERNAL MEDICINE

## 2021-07-23 PROCEDURE — 99233 SBSQ HOSP IP/OBS HIGH 50: CPT | Performed by: INTERNAL MEDICINE

## 2021-07-23 PROCEDURE — 250N000011 HC RX IP 250 OP 636: Performed by: STUDENT IN AN ORGANIZED HEALTH CARE EDUCATION/TRAINING PROGRAM

## 2021-07-23 PROCEDURE — 36592 COLLECT BLOOD FROM PICC: CPT | Performed by: INTERNAL MEDICINE

## 2021-07-23 PROCEDURE — 87491 CHLMYD TRACH DNA AMP PROBE: CPT | Performed by: PHYSICIAN ASSISTANT

## 2021-07-23 PROCEDURE — 85027 COMPLETE CBC AUTOMATED: CPT | Performed by: PHYSICIAN ASSISTANT

## 2021-07-23 PROCEDURE — 250N000013 HC RX MED GY IP 250 OP 250 PS 637: Performed by: PHYSICIAN ASSISTANT

## 2021-07-23 RX ADMIN — DABIGATRAN ETEXILATE MESYLATE 150 MG: 150 CAPSULE ORAL at 21:30

## 2021-07-23 RX ADMIN — HYDROMORPHONE HYDROCHLORIDE 0.5 MG: 1 INJECTION, SOLUTION INTRAMUSCULAR; INTRAVENOUS; SUBCUTANEOUS at 14:05

## 2021-07-23 RX ADMIN — ALBUTEROL SULFATE 2.5 MG: 2.5 SOLUTION RESPIRATORY (INHALATION) at 17:00

## 2021-07-23 RX ADMIN — HYDROMORPHONE HYDROCHLORIDE 0.5 MG: 1 INJECTION, SOLUTION INTRAMUSCULAR; INTRAVENOUS; SUBCUTANEOUS at 15:30

## 2021-07-23 RX ADMIN — HYDROMORPHONE HYDROCHLORIDE 0.5 MG: 1 INJECTION, SOLUTION INTRAMUSCULAR; INTRAVENOUS; SUBCUTANEOUS at 11:19

## 2021-07-23 RX ADMIN — ONDANSETRON 4 MG: 2 INJECTION INTRAMUSCULAR; INTRAVENOUS at 12:35

## 2021-07-23 RX ADMIN — HYDROMORPHONE HYDROCHLORIDE 0.5 MG: 1 INJECTION, SOLUTION INTRAMUSCULAR; INTRAVENOUS; SUBCUTANEOUS at 05:21

## 2021-07-23 RX ADMIN — HYDROMORPHONE HYDROCHLORIDE 0.5 MG: 1 INJECTION, SOLUTION INTRAMUSCULAR; INTRAVENOUS; SUBCUTANEOUS at 07:27

## 2021-07-23 RX ADMIN — HYDROMORPHONE HYDROCHLORIDE 0.5 MG: 1 INJECTION, SOLUTION INTRAMUSCULAR; INTRAVENOUS; SUBCUTANEOUS at 19:05

## 2021-07-23 RX ADMIN — HYDROXYUREA 2000 MG: 500 CAPSULE ORAL at 21:31

## 2021-07-23 RX ADMIN — Medication 5 ML: at 07:26

## 2021-07-23 RX ADMIN — ACETAMINOPHEN 650 MG: 325 TABLET, FILM COATED ORAL at 09:53

## 2021-07-23 RX ADMIN — OXYCODONE HYDROCHLORIDE 15 MG: 5 TABLET ORAL at 14:04

## 2021-07-23 RX ADMIN — ASPIRIN 81 MG CHEWABLE TABLET 81 MG: 81 TABLET CHEWABLE at 21:31

## 2021-07-23 RX ADMIN — HYDROMORPHONE HYDROCHLORIDE 0.5 MG: 1 INJECTION, SOLUTION INTRAMUSCULAR; INTRAVENOUS; SUBCUTANEOUS at 22:36

## 2021-07-23 RX ADMIN — HYDROMORPHONE HYDROCHLORIDE 0.5 MG: 1 INJECTION, SOLUTION INTRAMUSCULAR; INTRAVENOUS; SUBCUTANEOUS at 20:27

## 2021-07-23 RX ADMIN — HYDROMORPHONE HYDROCHLORIDE 0.5 MG: 1 INJECTION, SOLUTION INTRAMUSCULAR; INTRAVENOUS; SUBCUTANEOUS at 04:17

## 2021-07-23 RX ADMIN — DABIGATRAN ETEXILATE MESYLATE 150 MG: 150 CAPSULE ORAL at 11:16

## 2021-07-23 RX ADMIN — HYDROMORPHONE HYDROCHLORIDE 0.5 MG: 1 INJECTION, SOLUTION INTRAMUSCULAR; INTRAVENOUS; SUBCUTANEOUS at 16:54

## 2021-07-23 RX ADMIN — OXYCODONE HYDROCHLORIDE 15 MG: 5 TABLET ORAL at 08:56

## 2021-07-23 RX ADMIN — Medication 5 ML: at 16:05

## 2021-07-23 RX ADMIN — Medication 5 ML: at 12:36

## 2021-07-23 RX ADMIN — SERTRALINE HYDROCHLORIDE 200 MG: 100 TABLET ORAL at 21:31

## 2021-07-23 RX ADMIN — ARIPIPRAZOLE 2 MG: 2 TABLET ORAL at 21:30

## 2021-07-23 RX ADMIN — HYDROMORPHONE HYDROCHLORIDE 0.5 MG: 1 INJECTION, SOLUTION INTRAMUSCULAR; INTRAVENOUS; SUBCUTANEOUS at 12:35

## 2021-07-23 RX ADMIN — HYDROMORPHONE HYDROCHLORIDE 0.5 MG: 1 INJECTION, SOLUTION INTRAMUSCULAR; INTRAVENOUS; SUBCUTANEOUS at 02:56

## 2021-07-23 RX ADMIN — LEVOFLOXACIN 500 MG: 500 TABLET, FILM COATED ORAL at 21:31

## 2021-07-23 RX ADMIN — HYDROMORPHONE HYDROCHLORIDE 0.5 MG: 1 INJECTION, SOLUTION INTRAMUSCULAR; INTRAVENOUS; SUBCUTANEOUS at 21:30

## 2021-07-23 RX ADMIN — HYDROMORPHONE HYDROCHLORIDE 0.5 MG: 1 INJECTION, SOLUTION INTRAMUSCULAR; INTRAVENOUS; SUBCUTANEOUS at 09:54

## 2021-07-23 RX ADMIN — HYDROMORPHONE HYDROCHLORIDE 0.5 MG: 1 INJECTION, SOLUTION INTRAMUSCULAR; INTRAVENOUS; SUBCUTANEOUS at 08:57

## 2021-07-23 ASSESSMENT — ACTIVITIES OF DAILY LIVING (ADL)
ADLS_ACUITY_SCORE: 14
ADLS_ACUITY_SCORE: 13
ADLS_ACUITY_SCORE: 14
ADLS_ACUITY_SCORE: 14

## 2021-07-23 NOTE — PLAN OF CARE
Time:  1900-0730     Reason for admission:  SOB, PE, Hypoxia  Activity:  SBA  Pain:  C/o generalized pain throughout controlled with q1h dilaudid x8 and po Oxy  Neuro:  AOx4, able to make needs known. Pt very pleasant and talkative this shift.   Cardiac:  WDL  Respiratory: Stable on 2-3L Oxymask overnight.   GI/:  Voiding w/ urgency adequate amounts in BSC. Tiffanie urine. LBM 7/22. Per pt. Took stool softeners on nights.   Diet:  Reg diet. Tolerating well with good appetite.   Lines:  L chest Port SL, WDL.  Skin:  Remains intact w/o issues.      Plan:  Continue with pain plan, PO abx and wean O2 as able.

## 2021-07-23 NOTE — PROGRESS NOTES
Physician Attestation     I, uSraj Aguillon, saw and evaluated Jennifer Cervantes as part of a shared visit.  I have reviewed and discussed with the advanced practice provider/student their history, physical and plan.     I personally reviewed the vital signs, medications, labs and imaging.     My key history or physical exam findings: Patient seen and examined today currently feels slightly improved compared to yesterday.  Had an episode of tachycardia and tachypnea with associated hypoxia in the AM, however she felt okay.     PE:   VS: Afebrile and stable  GEN: NAD  CV: RRR S1/S2, no m,r,g  Pulm: Slightly increased work of breathing, diffuse inspiratory and expiratory wheezes, basilar crackles.   Abd: soft, NT, ND, +BS  Ext: warm and well perfused, no edema     Key management decisions made by me: Pt is a 21 year old with Sickle cell anemia, multiple thrombotic complications presents with acute hypoxic respiratory failure secondary to acute PE, acute pain crisis, and asthma exacerbation and being treated for a likely atypical pneumonia with clinical improvement.  Will attempt to wean O2 today, repeat ambulatory pulse ox and discharge tomorrow if she successfully stays off O2.        Suraj Aguillon  Date of Service (when I saw the patient):7/23/21      INTERNAL MEDICINE   DAILY PROGRESS NOTE  Gold 8      Jennifer Cervantes ([id number])   Admission Date: 7/13/2021  Date of Service: 07/23/2021      Changes Today     Continue:   PRN oral oxycodone 15 mg every 4 hours   PRN IV Dilaudid 0.5 every hour   PRN tylenol 650 mg every 6 hours  Levaquin 500mg   Ambulation pulse ox  Encouraged ambulation, diet advancement and use of bowel meds  Possible Discharge tomorrow?    Assessment & Plan   Jennifer Cervantes is a 21 year old female admitted on 7/13/2021. She has a past medical history significant for sickle cell anemia, history of stroke with right upper extremity hemiparesis, venous thrombosis/PE on xarelto, anxiety,  depression, and asthma who presented to the ED from infusion clinic after found to be hypoxic and complains of myalgias, shortness of breath, cough and rhinorrhea. She is admitted to Internal Medicine for further work-up and management of acute on chronic PE.    # Acute hypoxic respiratory failure  # Acute on chronic pulmonary embolism  # Sepsis due likely to atypical pneumonia (resolving)  Presenting with myalgias, SOB, cough and rhinorrhea. Afebrile. Noted to be hypoxic to 80s at infusion clinic. Has history of acute chest syndrome requiring exchange transfusions as well as previous PE while on AC. NM scan this admission with new acute PE as well. 7/21, pt triggered sepsis protocol and has new onset cough and increased oxygen needs concern for pneumonia. Sepsis diagnosed based on HR > 90, RR> 20 and WBC >12.   - Levaquin for 7 days  - wean O2   - no oxygen unless persistently below 88  - Continue dabigatran   - Ambulation pulse ox   - Appreciate Heme Consult      Asthma  # Acute Asthma Exacerbation  PTA on symbicort, albuterol. Has used inhaler this week with no improvement in symptoms.  - Continue pta symbicort BID  - PRN albuterol HFA inhaler or nebulizer  - PRN nebs changed from 6 hrs to 2 hours    # Sickle cell anemia with pain crisis  Concern for acute chest as above. Did get one unit red cells this admisstion. Has had consistent pain this admission  - Hematology consult appreciated  - Continue PTA oxycontin, prn oxycodone  - Capnography, pulse oximetry  - Continue pta hydea, jadenu     Hypokalemia (resolved)  Potassium low in setting of poor appetite today while feeling unwell.  - K replacement protocol        Hx of PE, RUE DVT - Dabigatran  Hx of CVA with residual RUE deficits - Remote history of CVA in 2015  Anxiety/Depression - Continue pta zoloft 200 mg daily, abilify 2 mg daily     Diet: Combination Diet Regular Diet Adult    DVT Prophylaxis: dabigatran  Lopez Catheter: Not present  Central Lines:  None  Code Status: Full Code    Seen and discussed with my attending physician,  ,who agrees with above assessment and plan.      Tati Mcclendon MS4      Subjective     Pt feeling better, saying she is trying to request less pain meds. She states she thinks she can go home either today or tomorrow.    Denies n/v or chills/fever    Nursing note was reviewed, no acute event overnight.    ROS negative except above.    Objective   Most recent vital signs:  BP (!) 140/74 (BP Location: Left arm)   Pulse 104   Temp 97.7  F (36.5  C) (Oral)   Resp 20   Wt 77.9 kg (171 lb 11.8 oz)   SpO2 96%   BMI 29.48 kg/m    Temp:  [97.7  F (36.5  C)-99.6  F (37.6  C)] 97.7  F (36.5  C)  Pulse:  [104-120] 104  Resp:  [18-20] 20  BP: (124-145)/(74-83) 140/74  SpO2:  [91 %-98 %] 96 %  Wt Readings from Last 2 Encounters:   07/19/21 77.9 kg (171 lb 11.8 oz)   07/12/21 77.1 kg (170 lb)       Intake/Output Summary (Last 24 hours) at 7/20/2021 0905  Last data filed at 7/20/2021 0738  Gross per 24 hour   Intake 242 ml   Output 2400 ml   Net -2158 ml       Physical exam:  General: Alert, oriented, cooperative, no apparent distress,   Eyes: Eyes are clear, pupils are reactive, no scleral icterus.  EOMI  HENT: Oropharynx is clear and moist. No evidence of cranial trauma.  Cardiovascular: Regular rate and rhythm, no murmur  Respiratory: Coarse breath sounds, significantly less wheezing   GI: Soft, non-tender, normal bowel sounds  Genitourinary: Deferred   Musculoskeletal: Normal muscle bulk and tone. Pt having some left flank pain  Skin: Warm and dry, no rashes.   Neurologic: Neck supple. Cranial nerves are grossly intact.  is symmetric. CN II-XII intact    Labs  CMP  Recent Labs   Lab 07/23/21  0729 07/22/21  0800 07/21/21  0701 07/20/21  0742    136 137 138   POTASSIUM 3.5 3.9 3.9 4.0   CHLORIDE 106 104 104 106   CO2 25 29 26 27   ANIONGAP 7 3 7 5   GLC 96 91 96 87   BUN 6* 7 8 8   CR 0.58 0.56 0.68 0.58   GFRESTIMATED >90 >90  >90 >90   MICAH 8.8 8.8 9.2 9.2   PROTTOTAL 7.7 7.8 8.1 8.1   ALBUMIN 3.7 3.6 3.8 3.8   BILITOTAL 3.8* 4.7* 5.2* 3.8*   ALKPHOS 79 78 76 73   * 124* 112* 84*   ALT 95* 103* 99* 75*     CBC  Recent Labs   Lab 07/22/21  0800 07/21/21  0701 07/19/21  0623   WBC 15.1* 14.0* 11.6*   RBC 2.45* 2.74* 2.54*   HGB 7.6* 8.3* 8.1*   HCT 22.2* 24.5* 23.8*   MCV 91 89 94   MCH 31.0 30.3 31.9   MCHC 34.2 33.9 34.0   RDW 23.0* 21.1* 21.8*    270 305     INRNo lab results found in last 7 days.  Arterial Blood GasNo lab results found in last 7 days.      Imaging  7/14/2021  CXR  IMPRESSION: Left internal jugular venous Port-A-Cath terminates near the superior cavoatrial junction. Mild elevation of the right hemidiaphragm. Lungs appear clear. No pleural fluid or pneumothorax. Normal heart size and pulmonary vascularity.    7/13/2021  NM Lung Scan   Impression:   1. At least one new segmental perfusion defect in the left upper lobe  apicoposterior segment on a background of known subsegmental and  segmental perfusion defects, suggestive of acute on chronic pulmonary  emboli.    2. Clear lungs on SPECT-CT.    7/13/2021  CXR  IMPRESSION:   Mildly decreased hazy bibasilar opacities, favored atelectasis versus  less likely infection.    7/13/2021  CXR 2VW  IMPRESSION:   Hazy bibasilar opacities, atelectasis versus infection.

## 2021-07-23 NOTE — PLAN OF CARE
[id number]    Blood pressure 136/75, pulse 107, temperature 99.6  F (37.6  C), temperature source Oral, resp. rate 20, weight 77.9 kg (171 lb 11.8 oz), SpO2 (!) 87 %, not currently breastfeeding.    Pt is currently resting in bed watching tv, able to get up independently to bedside commode. VSS at RA except for SpO2 is variable reading 85%-93%. Goal is to maintain SpO2 at 88% or above on RA. Incentive Spirometer has been encouraged throughout shift. Pt states desire to discharge tomorrow, this will be approved per care team if goal of SpO2 >88% is met on RA. Lungs have crackles in all fields upon auscultation. Patient rates pain at 6 throughout shift unchanged with PRN IV dilaudid Q1h, prn oxycodone, and prn acetaminophen. No bm this shift.    Plan is to continue use of IS so as to improve gas exchange and achieve SpO2 >88% on RA.

## 2021-07-24 LAB
ALBUMIN SERPL-MCNC: 3.4 G/DL (ref 3.4–5)
ALP SERPL-CCNC: 74 U/L (ref 40–150)
ALT SERPL W P-5'-P-CCNC: 88 U/L (ref 0–50)
ANION GAP SERPL CALCULATED.3IONS-SCNC: 5 MMOL/L (ref 3–14)
AST SERPL W P-5'-P-CCNC: 119 U/L (ref 0–45)
BILIRUB SERPL-MCNC: 3.8 MG/DL (ref 0.2–1.3)
BUN SERPL-MCNC: 5 MG/DL (ref 7–30)
C TRACH DNA SPEC QL NAA+PROBE: NEGATIVE
CALCIUM SERPL-MCNC: 8.3 MG/DL (ref 8.5–10.1)
CHLORIDE BLD-SCNC: 107 MMOL/L (ref 94–109)
CO2 SERPL-SCNC: 25 MMOL/L (ref 20–32)
CREAT SERPL-MCNC: 0.48 MG/DL (ref 0.52–1.04)
ERYTHROCYTE [DISTWIDTH] IN BLOOD BY AUTOMATED COUNT: 27.3 % (ref 10–15)
GFR SERPL CREATININE-BSD FRML MDRD: >90 ML/MIN/1.73M2
GLUCOSE BLD-MCNC: 87 MG/DL (ref 70–99)
HCT VFR BLD AUTO: 20.7 % (ref 35–47)
HGB BLD-MCNC: 7.4 G/DL (ref 11.7–15.7)
LACTATE SERPL-SCNC: 0.8 MMOL/L (ref 0.7–2)
MCH RBC QN AUTO: 32.2 PG (ref 26.5–33)
MCHC RBC AUTO-ENTMCNC: 35.7 G/DL (ref 31.5–36.5)
MCV RBC AUTO: 90 FL (ref 78–100)
N GONORRHOEA DNA SPEC QL NAA+PROBE: NEGATIVE
PLATELET # BLD AUTO: 291 10E3/UL (ref 150–450)
POTASSIUM BLD-SCNC: 3.4 MMOL/L (ref 3.4–5.3)
POTASSIUM BLD-SCNC: 3.5 MMOL/L (ref 3.4–5.3)
PROT SERPL-MCNC: 7.3 G/DL (ref 6.8–8.8)
RBC # BLD AUTO: 2.3 10E6/UL (ref 3.8–5.2)
RETICS # AUTO: 0.59 10E6/UL (ref 0.03–0.1)
RETICS/RBC NFR AUTO: 25.7 % (ref 0.5–2)
SODIUM SERPL-SCNC: 137 MMOL/L (ref 133–144)
WBC # BLD AUTO: 14.4 10E3/UL (ref 4–11)

## 2021-07-24 PROCEDURE — 85027 COMPLETE CBC AUTOMATED: CPT | Performed by: INTERNAL MEDICINE

## 2021-07-24 PROCEDURE — 36592 COLLECT BLOOD FROM PICC: CPT | Performed by: INTERNAL MEDICINE

## 2021-07-24 PROCEDURE — 82040 ASSAY OF SERUM ALBUMIN: CPT | Performed by: STUDENT IN AN ORGANIZED HEALTH CARE EDUCATION/TRAINING PROGRAM

## 2021-07-24 PROCEDURE — 85045 AUTOMATED RETICULOCYTE COUNT: CPT | Performed by: STUDENT IN AN ORGANIZED HEALTH CARE EDUCATION/TRAINING PROGRAM

## 2021-07-24 PROCEDURE — 250N000009 HC RX 250: Performed by: INTERNAL MEDICINE

## 2021-07-24 PROCEDURE — 36592 COLLECT BLOOD FROM PICC: CPT | Performed by: STUDENT IN AN ORGANIZED HEALTH CARE EDUCATION/TRAINING PROGRAM

## 2021-07-24 PROCEDURE — 250N000013 HC RX MED GY IP 250 OP 250 PS 637: Performed by: INTERNAL MEDICINE

## 2021-07-24 PROCEDURE — 250N000011 HC RX IP 250 OP 636: Performed by: STUDENT IN AN ORGANIZED HEALTH CARE EDUCATION/TRAINING PROGRAM

## 2021-07-24 PROCEDURE — 250N000013 HC RX MED GY IP 250 OP 250 PS 637: Performed by: STUDENT IN AN ORGANIZED HEALTH CARE EDUCATION/TRAINING PROGRAM

## 2021-07-24 PROCEDURE — 120N000002 HC R&B MED SURG/OB UMMC

## 2021-07-24 PROCEDURE — 84132 ASSAY OF SERUM POTASSIUM: CPT | Performed by: INTERNAL MEDICINE

## 2021-07-24 PROCEDURE — 250N000011 HC RX IP 250 OP 636: Performed by: INTERNAL MEDICINE

## 2021-07-24 PROCEDURE — 83605 ASSAY OF LACTIC ACID: CPT | Performed by: INTERNAL MEDICINE

## 2021-07-24 PROCEDURE — 99232 SBSQ HOSP IP/OBS MODERATE 35: CPT | Performed by: INTERNAL MEDICINE

## 2021-07-24 PROCEDURE — 250N000013 HC RX MED GY IP 250 OP 250 PS 637: Performed by: PHYSICIAN ASSISTANT

## 2021-07-24 RX ORDER — POTASSIUM CHLORIDE 750 MG/1
40 TABLET, EXTENDED RELEASE ORAL ONCE
Status: COMPLETED | OUTPATIENT
Start: 2021-07-24 | End: 2021-07-24

## 2021-07-24 RX ORDER — HYDROMORPHONE HYDROCHLORIDE 1 MG/ML
0.5 INJECTION, SOLUTION INTRAMUSCULAR; INTRAVENOUS; SUBCUTANEOUS
Status: DISCONTINUED | OUTPATIENT
Start: 2021-07-24 | End: 2021-07-25 | Stop reason: HOSPADM

## 2021-07-24 RX ADMIN — SERTRALINE HYDROCHLORIDE 200 MG: 100 TABLET ORAL at 22:18

## 2021-07-24 RX ADMIN — HYDROMORPHONE HYDROCHLORIDE 0.5 MG: 1 INJECTION, SOLUTION INTRAMUSCULAR; INTRAVENOUS; SUBCUTANEOUS at 00:06

## 2021-07-24 RX ADMIN — POTASSIUM CHLORIDE 40 MEQ: 750 TABLET, EXTENDED RELEASE ORAL at 14:13

## 2021-07-24 RX ADMIN — HYDROMORPHONE HYDROCHLORIDE 0.5 MG: 1 INJECTION, SOLUTION INTRAMUSCULAR; INTRAVENOUS; SUBCUTANEOUS at 03:26

## 2021-07-24 RX ADMIN — HYDROMORPHONE HYDROCHLORIDE 0.5 MG: 1 INJECTION, SOLUTION INTRAMUSCULAR; INTRAVENOUS; SUBCUTANEOUS at 06:52

## 2021-07-24 RX ADMIN — LEVOFLOXACIN 500 MG: 500 TABLET, FILM COATED ORAL at 20:48

## 2021-07-24 RX ADMIN — HYDROMORPHONE HYDROCHLORIDE 0.5 MG: 1 INJECTION, SOLUTION INTRAMUSCULAR; INTRAVENOUS; SUBCUTANEOUS at 05:50

## 2021-07-24 RX ADMIN — HYDROMORPHONE HYDROCHLORIDE 0.5 MG: 1 INJECTION, SOLUTION INTRAMUSCULAR; INTRAVENOUS; SUBCUTANEOUS at 16:08

## 2021-07-24 RX ADMIN — HYDROMORPHONE HYDROCHLORIDE 0.5 MG: 1 INJECTION, SOLUTION INTRAMUSCULAR; INTRAVENOUS; SUBCUTANEOUS at 14:15

## 2021-07-24 RX ADMIN — ALBUTEROL SULFATE 2.5 MG: 2.5 SOLUTION RESPIRATORY (INHALATION) at 16:24

## 2021-07-24 RX ADMIN — OXYCODONE HYDROCHLORIDE 15 MG: 5 TABLET ORAL at 22:18

## 2021-07-24 RX ADMIN — DABIGATRAN ETEXILATE MESYLATE 150 MG: 150 CAPSULE ORAL at 08:09

## 2021-07-24 RX ADMIN — Medication 5 ML: at 14:15

## 2021-07-24 RX ADMIN — HYDROMORPHONE HYDROCHLORIDE 0.5 MG: 1 INJECTION, SOLUTION INTRAMUSCULAR; INTRAVENOUS; SUBCUTANEOUS at 08:10

## 2021-07-24 RX ADMIN — HYDROMORPHONE HYDROCHLORIDE 0.5 MG: 1 INJECTION, SOLUTION INTRAMUSCULAR; INTRAVENOUS; SUBCUTANEOUS at 09:46

## 2021-07-24 RX ADMIN — ALBUTEROL SULFATE 2 PUFF: 108 INHALANT RESPIRATORY (INHALATION) at 08:05

## 2021-07-24 RX ADMIN — HYDROMORPHONE HYDROCHLORIDE 0.5 MG: 1 INJECTION, SOLUTION INTRAMUSCULAR; INTRAVENOUS; SUBCUTANEOUS at 23:04

## 2021-07-24 RX ADMIN — HYDROMORPHONE HYDROCHLORIDE 0.5 MG: 1 INJECTION, SOLUTION INTRAMUSCULAR; INTRAVENOUS; SUBCUTANEOUS at 04:46

## 2021-07-24 RX ADMIN — HYDROMORPHONE HYDROCHLORIDE 0.5 MG: 1 INJECTION, SOLUTION INTRAMUSCULAR; INTRAVENOUS; SUBCUTANEOUS at 18:09

## 2021-07-24 RX ADMIN — ARIPIPRAZOLE 2 MG: 2 TABLET ORAL at 22:18

## 2021-07-24 RX ADMIN — HYDROMORPHONE HYDROCHLORIDE 0.5 MG: 1 INJECTION, SOLUTION INTRAMUSCULAR; INTRAVENOUS; SUBCUTANEOUS at 20:48

## 2021-07-24 RX ADMIN — Medication 5 ML: at 15:35

## 2021-07-24 RX ADMIN — HYDROXYUREA 2000 MG: 500 CAPSULE ORAL at 20:49

## 2021-07-24 RX ADMIN — ASPIRIN 81 MG CHEWABLE TABLET 81 MG: 81 TABLET CHEWABLE at 20:48

## 2021-07-24 RX ADMIN — HYDROMORPHONE HYDROCHLORIDE 0.5 MG: 1 INJECTION, SOLUTION INTRAMUSCULAR; INTRAVENOUS; SUBCUTANEOUS at 10:50

## 2021-07-24 RX ADMIN — OXYCODONE HYDROCHLORIDE 15 MG: 5 TABLET ORAL at 17:20

## 2021-07-24 RX ADMIN — OXYCODONE HYDROCHLORIDE 15 MG: 5 TABLET ORAL at 11:45

## 2021-07-24 RX ADMIN — DABIGATRAN ETEXILATE MESYLATE 150 MG: 150 CAPSULE ORAL at 20:48

## 2021-07-24 ASSESSMENT — ACTIVITIES OF DAILY LIVING (ADL)
ADLS_ACUITY_SCORE: 13

## 2021-07-24 NOTE — PLAN OF CARE
Pt A&O, VSS on RA except tachycardia. Sepsis triggered, LA 0.8. Pleasant. Pt hopes to discharge tonight. Reports she feels much better. Crackles in RLL, pt requested prn albuterol neb x1 with reported improvement in breathing. Continuing with pain medication for acute chest/SCC, pt reports generalized pain with worst pain in her back. Prn dilaudid x2 and oxy x1.     Pt states that should she feel better tonight, she would like to discharge if able.

## 2021-07-24 NOTE — PLAN OF CARE
[id number]    Blood pressure 114/74, pulse 102, temperature 97.7  F (36.5  C), temperature source Oral, resp. rate 16, weight 77.9 kg (171 lb 11.8 oz), SpO2 94 %, not currently breastfeeding.    Pt is currently lying in bed resting. VSS on RA. Lung sounds clear (post inhaler treatment - see MAR) Bowel sounds normoactive in all quadrants. Denies nausea and vomiting. Denies numbness and tingling. Reports pain at 6-7. Q1H PRN IV dilauded switched to Q2H. Tapering down on frequency of Iv pain medications and transitioning to PO medications (see MAR).    Plan is to maintain SpO2 >88% on RA and control pain. Discharge potentially tonight or tomorrow if goals are met.

## 2021-07-24 NOTE — PLAN OF CARE
Time: 1042-7899    Reason for admission: PE    A&Ox4. VSS on RA-2L oxymask. O2 goes between 83%-92% intermittently requiring oxygen. Administered prn neb. Pt frequently removes oxymask when pt still requiring O2. Encouraging IS. Pt c/o lower back pain & generalized pain. Administered IV dilaudid q1h prn. Pt asks for dilaudid q1h when awake. Pt denies nausea. LBM 7/23/21.     Plan: Attempt to wean off O2. Will continue to monitor.

## 2021-07-24 NOTE — PROGRESS NOTES
[facility]    Medicine Progress Note - Hospitalist Service, Gold 8       Date of Admission:  7/13/2021    Assessment & Plan           Jennifer Cervantes is a 21 year old female admitted on 7/13/2021. She has a past medical history significant for sickle cell anemia, history of stroke with right upper extremity hemiparesis, venous thrombosis/PE on xarelto, anxiety, depression, and asthma who presented to the ED from infusion clinic after found to be hypoxic and complains of myalgias, shortness of breath, cough and rhinorrhea. She is admitted to Internal Medicine for further work-up and management of acute on chronic PE.     Plan for today:  Wean IV narcotics  Keep off O2  Ensure stability of hemoglobin  Continue antibiotics   If she can stay off O2 for 24 hours and requires minimal IV pain meds, then she can be safely discharge tomorrow.   Outpatient sleep study for possible AMAN given observed nocturnal hypoxia.     # Acute hypoxic respiratory failure  # Acute on chronic pulmonary embolism  # Acute Asthma exacerbation   Sepsis due to Likely due to atypical pneumonia, resolving: Diagnosis based on HR > 90 bpm, RR > 20 breaths/min and WBC > 12 due to infection.    Presenting with myalgias, SOB, cough and rhinorrhea. Afebrile. Noted to be hypoxic to 80s at infusion clinic. Incidentally found to have a PE on V/Q scan.  Was switched from xarelto to dabigatran.  Improved initially, however is intermittently hypoxic which predisposes her to ongoing sickling.  Started back on levaquin yesterday in the context of SIRS positivity and persistent hypoxia   - Levaquin for 7 days   - Wean O2   - Ambulatory pulse ox tomorrow   - Low probability for acute chest.   - Continue pta symbicort BID  - PRN albuterol  Nebulizer q2 hours  - there may be a component of AMAN, given most episodes of hypoxia occur overnight, recommend outpatient sleep study.      # Sickle cell anemia with pain  crisis  Concern for acute chest as above. Did get one unit red cells this admisstion. Has had consistent pain this admission, likely exacerbated by intermittent hypoxia   - Hematology consult appreciated  - Will space out IV pain meds with hopes of discharge tomorrow.   - Continue PTA oxycontin, prn oxycodone, dilaudid for breakthrough  - Continue pta hydea, jadenu  - Bilirubin trending back down.     Hypokalemia (resolved)  Potassium low in setting of poor appetite today while feeling unwell.  - K replacement protocol     Hx of PE, RUE DVT - Dabigatran  Hx of CVA with residual RUE deficits - Remote history of CVA in 2015, continue asa  Anxiety/Depression - Continue pta zoloft 200 mg daily, abilify 2 mg daily      Diet: Combination Diet Regular Diet Adult    DVT Prophylaxis: Enoxaparin (Lovenox) SQ  Lopez Catheter: Not present  Central Lines: None  Code Status: Full Code      Disposition Plan   Expected discharge: 07/24/2021 recommended to prior living arrangement once antibiotic plan established and hemoglobin stable.     The patient's care was discussed with the Bedside Nurse, Patient and Hematology Consultant.    Suraj Aguillon MD  Hospitalist Service, [address]  Securely message with the Vocera Web Console (learn more here)  Text page via [employer] Paging/Directory  Please see sign in/sign out for up to date coverage information    Risk Factors Present on Admission                ______________________________________________________________________    Interval History   Patient seen and examined. Overall she feels well, but is using more IV pain meds. She noticed decreased O2 overnight but did not feel dyspneic.  Denies fevers, chills, chest pain, N/V, abdominal pain or other symptoms.     Data reviewed today: I reviewed all medications, new labs and imaging results over the last 24 hours. I personally reviewed no images or EKG's today.    Physical Exam    Vital Signs: Temp: 98.1  F (36.7  C) Temp src: Oral BP: 134/58 Pulse: 111   Resp: 20 SpO2: 92 % O2 Device: None (Room air) Oxygen Delivery: 2 LPM  Weight: 171 lbs 11.81 oz  General Appearance: NAD  Respiratory: bilateral scattered rhonchi, mild expiratory wheezes, normal work of breathing on room air  Cardiovascular: tachy, regular ,S1/S2, 2/6 systolic ejection murmur  GI: soft, NT, ND, +BS  Skin: no rashes  Other: No edema     Data   Recent Labs   Lab 07/24/21  0830 07/23/21  1610 07/23/21  0729   WBC 14.4* 15.3* 14.5*   HGB 7.4* 7.9* 7.7*   MCV 90 89 91    344 250    137 138   POTASSIUM 3.4 4.0 3.5   CHLORIDE 107 105 106   CO2 25 26 25   BUN 5* 5* 6*   CR 0.48* 0.61 0.58   ANIONGAP 5 6 7   MICAH 8.3* 9.0 8.8   GLC 87 90 96   ALBUMIN 3.4 3.8 3.7   PROTTOTAL 7.3 7.8 7.7   BILITOTAL 3.8* 4.1* 3.8*   ALKPHOS 74 80 79   ALT 88* 98* 95*   * 125* 120*     No results found for this or any previous visit (from the past 24 hour(s)).  Medications     - MEDICATION INSTRUCTIONS -         ARIPiprazole  2 mg Oral Daily     aspirin  81 mg Oral Daily     carbamide peroxide  10 drop Right Ear BID     dabigatran ANTICOAGULANT  150 mg Oral BID     fluticasone-vilanterol  1 puff Inhalation Daily     heparin  5-10 mL Intracatheter Q28 Days     heparin lock flush  5-10 mL Intracatheter Q24H     hydroxyurea  2,000 mg Oral Daily     levofloxacin  500 mg Oral Daily     polyethylene glycol  17 g Oral Daily     senna-docusate  1 tablet Oral BID    Or     senna-docusate  2 tablet Oral BID     sertraline  200 mg Oral Daily     sodium chloride (PF)  10-20 mL Intracatheter Q28 Days     sodium chloride (PF)  3 mL Intracatheter Q8H

## 2021-07-25 ENCOUNTER — HOME INFUSION (PRE-WILLOW HOME INFUSION) (OUTPATIENT)
Dept: PHARMACY | Facility: CLINIC | Age: 21
End: 2021-07-25

## 2021-07-25 VITALS
HEART RATE: 105 BPM | TEMPERATURE: 98.8 F | SYSTOLIC BLOOD PRESSURE: 117 MMHG | BODY MASS INDEX: 29.48 KG/M2 | OXYGEN SATURATION: 93 % | WEIGHT: 171.74 LBS | RESPIRATION RATE: 18 BRPM | DIASTOLIC BLOOD PRESSURE: 65 MMHG

## 2021-07-25 LAB
ALBUMIN SERPL-MCNC: 3.6 G/DL (ref 3.4–5)
ALP SERPL-CCNC: 76 U/L (ref 40–150)
ALT SERPL W P-5'-P-CCNC: 82 U/L (ref 0–50)
ANION GAP SERPL CALCULATED.3IONS-SCNC: 8 MMOL/L (ref 3–14)
AST SERPL W P-5'-P-CCNC: 121 U/L (ref 0–45)
BILIRUB SERPL-MCNC: 3.7 MG/DL (ref 0.2–1.3)
BUN SERPL-MCNC: 5 MG/DL (ref 7–30)
CALCIUM SERPL-MCNC: 8.8 MG/DL (ref 8.5–10.1)
CHLORIDE BLD-SCNC: 109 MMOL/L (ref 94–109)
CO2 SERPL-SCNC: 23 MMOL/L (ref 20–32)
CREAT SERPL-MCNC: 0.55 MG/DL (ref 0.52–1.04)
ERYTHROCYTE [DISTWIDTH] IN BLOOD BY AUTOMATED COUNT: 28.8 % (ref 10–15)
GFR SERPL CREATININE-BSD FRML MDRD: >90 ML/MIN/1.73M2
GLUCOSE BLD-MCNC: 90 MG/DL (ref 70–99)
HCT VFR BLD AUTO: 23.5 % (ref 35–47)
HGB BLD-MCNC: 8.1 G/DL (ref 11.7–15.7)
MCH RBC QN AUTO: 32.3 PG (ref 26.5–33)
MCHC RBC AUTO-ENTMCNC: 34.5 G/DL (ref 31.5–36.5)
MCV RBC AUTO: 94 FL (ref 78–100)
PLATELET # BLD AUTO: 429 10E3/UL (ref 150–450)
POTASSIUM BLD-SCNC: 3.4 MMOL/L (ref 3.4–5.3)
PROT SERPL-MCNC: 7.4 G/DL (ref 6.8–8.8)
RBC # BLD AUTO: 2.51 10E6/UL (ref 3.8–5.2)
RETICS # AUTO: 0.59 10E6/UL (ref 0.03–0.1)
RETICS/RBC NFR AUTO: 23.5 % (ref 0.5–2)
SODIUM SERPL-SCNC: 140 MMOL/L (ref 133–144)
WBC # BLD AUTO: 13.4 10E3/UL (ref 4–11)

## 2021-07-25 PROCEDURE — 250N000013 HC RX MED GY IP 250 OP 250 PS 637: Performed by: STUDENT IN AN ORGANIZED HEALTH CARE EDUCATION/TRAINING PROGRAM

## 2021-07-25 PROCEDURE — 85027 COMPLETE CBC AUTOMATED: CPT | Performed by: INTERNAL MEDICINE

## 2021-07-25 PROCEDURE — 250N000013 HC RX MED GY IP 250 OP 250 PS 637: Performed by: INTERNAL MEDICINE

## 2021-07-25 PROCEDURE — 99239 HOSP IP/OBS DSCHRG MGMT >30: CPT | Performed by: INTERNAL MEDICINE

## 2021-07-25 PROCEDURE — 85045 AUTOMATED RETICULOCYTE COUNT: CPT | Performed by: STUDENT IN AN ORGANIZED HEALTH CARE EDUCATION/TRAINING PROGRAM

## 2021-07-25 PROCEDURE — 36592 COLLECT BLOOD FROM PICC: CPT | Performed by: INTERNAL MEDICINE

## 2021-07-25 PROCEDURE — 250N000011 HC RX IP 250 OP 636: Performed by: STUDENT IN AN ORGANIZED HEALTH CARE EDUCATION/TRAINING PROGRAM

## 2021-07-25 PROCEDURE — 82040 ASSAY OF SERUM ALBUMIN: CPT | Performed by: STUDENT IN AN ORGANIZED HEALTH CARE EDUCATION/TRAINING PROGRAM

## 2021-07-25 RX ORDER — HEPARIN SODIUM,PORCINE 10 UNIT/ML
5 VIAL (ML) INTRAVENOUS
Status: DISCONTINUED | OUTPATIENT
Start: 2021-07-25 | End: 2021-07-25 | Stop reason: HOSPADM

## 2021-07-25 RX ORDER — OXYCODONE HCL 10 MG/1
10 TABLET, FILM COATED, EXTENDED RELEASE ORAL EVERY 12 HOURS
Qty: 2 TABLET | Refills: 0 | Status: SHIPPED | OUTPATIENT
Start: 2021-07-25 | End: 2021-07-26

## 2021-07-25 RX ORDER — HEPARIN SODIUM (PORCINE) LOCK FLUSH IV SOLN 100 UNIT/ML 100 UNIT/ML
5 SOLUTION INTRAVENOUS
Status: DISCONTINUED | OUTPATIENT
Start: 2021-07-25 | End: 2021-07-25 | Stop reason: HOSPADM

## 2021-07-25 RX ORDER — LEVOFLOXACIN 500 MG/1
500 TABLET, FILM COATED ORAL DAILY
Qty: 4 TABLET | Refills: 0 | Status: SHIPPED | OUTPATIENT
Start: 2021-07-25 | End: 2021-07-29

## 2021-07-25 RX ORDER — HEPARIN SODIUM (PORCINE) LOCK FLUSH IV SOLN 100 UNIT/ML 100 UNIT/ML
5 SOLUTION INTRAVENOUS
Status: CANCELLED | OUTPATIENT
Start: 2021-08-07

## 2021-07-25 RX ORDER — ASPIRIN 81 MG/1
81 TABLET, CHEWABLE ORAL DAILY
Start: 2021-07-25 | End: 2021-11-01

## 2021-07-25 RX ORDER — DABIGATRAN ETEXILATE 150 MG/1
150 CAPSULE ORAL 2 TIMES DAILY
Qty: 60 CAPSULE | Refills: 0 | Status: SHIPPED | OUTPATIENT
Start: 2021-07-25 | End: 2021-09-20

## 2021-07-25 RX ORDER — HEPARIN SODIUM,PORCINE 10 UNIT/ML
5 VIAL (ML) INTRAVENOUS
Status: CANCELLED | OUTPATIENT
Start: 2021-08-07

## 2021-07-25 RX ORDER — OXYCODONE HCL 10 MG/1
10 TABLET, FILM COATED, EXTENDED RELEASE ORAL EVERY 12 HOURS
Status: DISCONTINUED | OUTPATIENT
Start: 2021-07-25 | End: 2021-07-25 | Stop reason: HOSPADM

## 2021-07-25 RX ADMIN — OXYCODONE HYDROCHLORIDE 10 MG: 10 TABLET, FILM COATED, EXTENDED RELEASE ORAL at 07:59

## 2021-07-25 RX ADMIN — Medication 5 ML: at 09:09

## 2021-07-25 RX ADMIN — OXYCODONE HYDROCHLORIDE 15 MG: 5 TABLET ORAL at 05:54

## 2021-07-25 RX ADMIN — DABIGATRAN ETEXILATE MESYLATE 150 MG: 150 CAPSULE ORAL at 07:41

## 2021-07-25 RX ADMIN — Medication 5 ML: at 06:57

## 2021-07-25 ASSESSMENT — ACTIVITIES OF DAILY LIVING (ADL)
ADLS_ACUITY_SCORE: 13

## 2021-07-25 NOTE — PLAN OF CARE
Blood pressure 117/65, pulse 105, temperature 98.8  F (37.1  C), temperature source Oral, resp. rate 18, weight 77.9 kg (171 lb 11.8 oz), SpO2 93 %, not currently breastfeeding.    VSS on RA. SpO2 goal met >88% on RA, no supplemental O2 needed. A&Ox4. Denies nausea/vomiting. Denies numbness/tingling. Reports pain at a 6, takes oral pain medication (see MAR). Continent of b/b. Last BM this morning per pt report. Regular diet, good appetite. Heart rhythm regular, tachycardia noted, Lung sounds clear with fine crackles noted in lower lobes. Port de-accessed, heparin locked.    Pt verbalizes readiness for discharge. Medications picked up at pharmacy. Pt verbalizes that she will continue to go to follow-up appointments with primary provider and to continue care at infusion clinic.

## 2021-07-25 NOTE — PLAN OF CARE
Time: 1900-0730    Reason for admission: PE    A&Ox4. VSS on RA. Pt c/o lower back pain & generalized pain. Administered IV dilaudid & oxycodone. Last IV dilaudid dose was around 7/24 2300. Pt denies nausea. LBM 7/24/21. Pt stated she is feeling much better & will be ready to discharge today.     Plan: Possible discharge home today. Will continue to monitor.

## 2021-07-26 ENCOUNTER — PATIENT OUTREACH (OUTPATIENT)
Dept: CARE COORDINATION | Facility: CLINIC | Age: 21
End: 2021-07-26

## 2021-07-26 ENCOUNTER — TELEPHONE (OUTPATIENT)
Dept: ONCOLOGY | Facility: CLINIC | Age: 21
End: 2021-07-26

## 2021-07-26 ENCOUNTER — INFUSION THERAPY VISIT (OUTPATIENT)
Dept: ONCOLOGY | Facility: CLINIC | Age: 21
End: 2021-07-26
Attending: PEDIATRICS
Payer: COMMERCIAL

## 2021-07-26 ENCOUNTER — LAB (OUTPATIENT)
Dept: LAB | Facility: CLINIC | Age: 21
End: 2021-07-26
Attending: PEDIATRICS
Payer: COMMERCIAL

## 2021-07-26 VITALS
HEART RATE: 109 BPM | OXYGEN SATURATION: 93 % | WEIGHT: 168.1 LBS | BODY MASS INDEX: 28.85 KG/M2 | SYSTOLIC BLOOD PRESSURE: 137 MMHG | DIASTOLIC BLOOD PRESSURE: 88 MMHG | TEMPERATURE: 98.9 F | RESPIRATION RATE: 16 BRPM

## 2021-07-26 DIAGNOSIS — D57.00 SICKLE CELL PAIN CRISIS (H): ICD-10-CM

## 2021-07-26 DIAGNOSIS — D57.1 HB-SS DISEASE WITHOUT CRISIS (H): Primary | ICD-10-CM

## 2021-07-26 DIAGNOSIS — D57.00 SICKLE CELL CRISIS (H): ICD-10-CM

## 2021-07-26 DIAGNOSIS — E83.111 HEMOCHROMATOSIS DUE TO REPEATED RED BLOOD CELL TRANSFUSIONS: Primary | ICD-10-CM

## 2021-07-26 DIAGNOSIS — Z71.89 OTHER SPECIFIED COUNSELING: ICD-10-CM

## 2021-07-26 LAB
ALBUMIN SERPL-MCNC: 3.8 G/DL (ref 3.4–5)
ALP SERPL-CCNC: 69 U/L (ref 40–150)
ALT SERPL W P-5'-P-CCNC: 72 U/L (ref 0–50)
ANION GAP SERPL CALCULATED.3IONS-SCNC: 2 MMOL/L (ref 3–14)
AST SERPL W P-5'-P-CCNC: 102 U/L (ref 0–45)
BASOPHILS # BLD MANUAL: 0.2 10E3/UL (ref 0–0.2)
BASOPHILS NFR BLD MANUAL: 2 %
BILIRUB SERPL-MCNC: 3.4 MG/DL (ref 0.2–1.3)
BUN SERPL-MCNC: 5 MG/DL (ref 7–30)
CALCIUM SERPL-MCNC: 8.8 MG/DL (ref 8.5–10.1)
CHLORIDE BLD-SCNC: 114 MMOL/L (ref 94–109)
CO2 SERPL-SCNC: 24 MMOL/L (ref 20–32)
CREAT SERPL-MCNC: 0.52 MG/DL (ref 0.52–1.04)
EOSINOPHIL # BLD MANUAL: 0.5 10E3/UL (ref 0–0.7)
EOSINOPHIL NFR BLD MANUAL: 5 %
ERYTHROCYTE [DISTWIDTH] IN BLOOD BY AUTOMATED COUNT: 27.1 % (ref 10–15)
FERRITIN SERPL-MCNC: ABNORMAL NG/ML (ref 12–150)
GFR SERPL CREATININE-BSD FRML MDRD: >90 ML/MIN/1.73M2
GLUCOSE BLD-MCNC: 82 MG/DL (ref 70–99)
HCT VFR BLD AUTO: 23.5 % (ref 35–47)
HGB BLD-MCNC: 8.2 G/DL (ref 11.7–15.7)
LYMPHOCYTES # BLD MANUAL: 1.6 10E3/UL (ref 0.8–5.3)
LYMPHOCYTES NFR BLD MANUAL: 16 %
MCH RBC QN AUTO: 33.1 PG (ref 26.5–33)
MCHC RBC AUTO-ENTMCNC: 34.9 G/DL (ref 31.5–36.5)
MCV RBC AUTO: 95 FL (ref 78–100)
MONOCYTES # BLD MANUAL: 0 10E3/UL (ref 0–1.3)
MONOCYTES NFR BLD MANUAL: 0 %
NEUTROPHILS # BLD MANUAL: 7.6 10E3/UL (ref 1.6–8.3)
NEUTROPHILS NFR BLD MANUAL: 77 %
NRBC # BLD AUTO: 8.3 10E3/UL
NRBC BLD MANUAL-RTO: 84 %
PLAT MORPH BLD: ABNORMAL
PLATELET # BLD AUTO: 384 10E3/UL (ref 150–450)
POLYCHROMASIA BLD QL SMEAR: ABNORMAL
POTASSIUM BLD-SCNC: 3.4 MMOL/L (ref 3.4–5.3)
PROT SERPL-MCNC: 7.6 G/DL (ref 6.8–8.8)
RBC # BLD AUTO: 2.48 10E6/UL (ref 3.8–5.2)
RBC MORPH BLD: ABNORMAL
SICKLE CELLS BLD QL SMEAR: ABNORMAL
SODIUM SERPL-SCNC: 140 MMOL/L (ref 133–144)
WBC # BLD AUTO: 9.9 10E3/UL (ref 4–11)

## 2021-07-26 PROCEDURE — 250N000011 HC RX IP 250 OP 636: Performed by: PEDIATRICS

## 2021-07-26 PROCEDURE — 36591 DRAW BLOOD OFF VENOUS DEVICE: CPT | Performed by: PHYSICIAN ASSISTANT

## 2021-07-26 PROCEDURE — 96376 TX/PRO/DX INJ SAME DRUG ADON: CPT

## 2021-07-26 PROCEDURE — 85027 COMPLETE CBC AUTOMATED: CPT | Performed by: PHYSICIAN ASSISTANT

## 2021-07-26 PROCEDURE — 96361 HYDRATE IV INFUSION ADD-ON: CPT

## 2021-07-26 PROCEDURE — 82728 ASSAY OF FERRITIN: CPT | Performed by: PHYSICIAN ASSISTANT

## 2021-07-26 PROCEDURE — 99214 OFFICE O/P EST MOD 30 MIN: CPT | Performed by: PEDIATRICS

## 2021-07-26 PROCEDURE — 258N000003 HC RX IP 258 OP 636: Performed by: PEDIATRICS

## 2021-07-26 PROCEDURE — G0463 HOSPITAL OUTPT CLINIC VISIT: HCPCS

## 2021-07-26 PROCEDURE — 96374 THER/PROPH/DIAG INJ IV PUSH: CPT

## 2021-07-26 PROCEDURE — 80053 COMPREHEN METABOLIC PANEL: CPT | Performed by: PHYSICIAN ASSISTANT

## 2021-07-26 RX ORDER — ONDANSETRON 8 MG/1
8 TABLET, ORALLY DISINTEGRATING ORAL
Status: DISCONTINUED | OUTPATIENT
Start: 2021-07-26 | End: 2021-07-26 | Stop reason: HOSPADM

## 2021-07-26 RX ORDER — DIPHENHYDRAMINE HCL 25 MG
25 CAPSULE ORAL
Status: CANCELLED
Start: 2021-07-26

## 2021-07-26 RX ORDER — DIPHENHYDRAMINE HCL 25 MG
25 CAPSULE ORAL
Status: DISCONTINUED | OUTPATIENT
Start: 2021-07-26 | End: 2021-07-26 | Stop reason: HOSPADM

## 2021-07-26 RX ORDER — HEPARIN SODIUM (PORCINE) LOCK FLUSH IV SOLN 100 UNIT/ML 100 UNIT/ML
5 SOLUTION INTRAVENOUS
Status: DISCONTINUED | OUTPATIENT
Start: 2021-07-26 | End: 2021-07-26 | Stop reason: HOSPADM

## 2021-07-26 RX ORDER — HEPARIN SODIUM (PORCINE) LOCK FLUSH IV SOLN 100 UNIT/ML 100 UNIT/ML
5 SOLUTION INTRAVENOUS
Status: CANCELLED | OUTPATIENT
Start: 2021-07-26

## 2021-07-26 RX ORDER — MORPHINE SULFATE 2 MG/ML
2 INJECTION, SOLUTION INTRAMUSCULAR; INTRAVENOUS
Status: CANCELLED
Start: 2021-07-26

## 2021-07-26 RX ORDER — ONDANSETRON 8 MG/1
8 TABLET, FILM COATED ORAL
Status: CANCELLED
Start: 2021-07-26

## 2021-07-26 RX ORDER — ONDANSETRON 8 MG/1
8 TABLET, FILM COATED ORAL
Status: CANCELLED | OUTPATIENT
Start: 2021-07-26

## 2021-07-26 RX ORDER — MORPHINE SULFATE 2 MG/ML
2 INJECTION, SOLUTION INTRAMUSCULAR; INTRAVENOUS
Status: DISCONTINUED | OUTPATIENT
Start: 2021-07-26 | End: 2021-07-26 | Stop reason: HOSPADM

## 2021-07-26 RX ORDER — HEPARIN SODIUM,PORCINE 10 UNIT/ML
5 VIAL (ML) INTRAVENOUS
Status: CANCELLED | OUTPATIENT
Start: 2021-07-26

## 2021-07-26 RX ORDER — HEPARIN SODIUM (PORCINE) LOCK FLUSH IV SOLN 100 UNIT/ML 100 UNIT/ML
5 SOLUTION INTRAVENOUS ONCE
Status: COMPLETED | OUTPATIENT
Start: 2021-07-26 | End: 2021-07-26

## 2021-07-26 RX ORDER — OXYCODONE HCL 10 MG/1
10 TABLET, FILM COATED, EXTENDED RELEASE ORAL EVERY 12 HOURS
Qty: 60 TABLET | Refills: 0 | Status: SHIPPED | OUTPATIENT
Start: 2021-07-26 | End: 2021-08-24

## 2021-07-26 RX ORDER — ONDANSETRON 8 MG/1
8 TABLET, FILM COATED ORAL
Status: DISCONTINUED | OUTPATIENT
Start: 2021-07-26 | End: 2021-07-26 | Stop reason: CLARIF

## 2021-07-26 RX ADMIN — MORPHINE SULFATE 2 MG: 2 INJECTION, SOLUTION INTRAMUSCULAR; INTRAVENOUS at 15:36

## 2021-07-26 RX ADMIN — MORPHINE SULFATE 2 MG: 2 INJECTION, SOLUTION INTRAMUSCULAR; INTRAVENOUS at 14:39

## 2021-07-26 RX ADMIN — Medication 5 ML: at 12:31

## 2021-07-26 RX ADMIN — Medication 5 ML: at 16:31

## 2021-07-26 RX ADMIN — DEXTROSE AND SODIUM CHLORIDE 500 ML: 5; 450 INJECTION, SOLUTION INTRAVENOUS at 14:37

## 2021-07-26 RX ADMIN — MORPHINE SULFATE 2 MG: 2 INJECTION, SOLUTION INTRAMUSCULAR; INTRAVENOUS at 16:29

## 2021-07-26 ASSESSMENT — PAIN SCALES - GENERAL
PAINLEVEL: EXTREME PAIN (8)
PAINLEVEL: SEVERE PAIN (7)
PAINLEVEL: SEVERE PAIN (6)
PAINLEVEL: MODERATE PAIN (5)
PAINLEVEL: SEVERE PAIN (7)
PAINLEVEL: EXTREME PAIN (8)

## 2021-07-26 NOTE — TELEPHONE ENCOUNTER
Calls in stating that she missed her appointment with DR Duncan.   Looked at appointment times and her appointment is actually at 12:30 with Dr Duncan. She is to have labs first at 11:45 but if she can make it to appointment with Dr Duncan than they can get the labs after her appointment if necessary. Should try to get here for labs first but she should absolutely make her appointment with Dr Duncan.   Has an infusion scheduled for after her appointment with Dr Duncan.   Wants to know if mother can come to clinic visit. 1 person can come to clinic visit but no one can be in for the infusion visit.

## 2021-07-26 NOTE — TELEPHONE ENCOUNTER
"Per Jennifer Cervantes,  Needs request for transportation.     11:43am This writer spoke to Abdullahi FRYE, will attempt to try to assist with ride although pt needs to be here asap as lab was scheduled for 11:45am and Provider appt at 12:30pm. It is too late to process ride through pt's insurance so will have to be a     This writer spoke to Jennifer, educated on taking responsibility for arranging own transportation for anticipated scheduled visits and her accountability if she is a no show for today.     Per Jennifer,\"Her cab ride today got screwed up, pt knows in future needs to be earlier with ride transportation and will still try to schedule her ride for today and future appts then hung up phone on writer.     11:50am Abdullahi was able to provide assist and LM VM for pt about transportation dispatching soon to try to get pt to clinic asap.     "

## 2021-07-26 NOTE — NURSING NOTE
Chief Complaint   Patient presents with     Port Draw     Labs drawn via port by RN in lab. Vs taken.      Port accessed with 20g flat needle by RN, labs collected, line flushed with saline and heparin.  Vitals taken. Pt checked in for appointment(s).    Shantell ARMIJO RN PHN BSN  BMT/Oncology Lab

## 2021-07-26 NOTE — PROGRESS NOTES
Clinic Care Coordination Contact    Background: Care Coordination referral placed from [facility] discharge report for reason of patient meeting criteria for a TCM outreach call by [facility] team.    Assessment: Upon chart review, [facility] Team member will cancel/close the referral for TCM outreach due to reason below:     Patient has a follow up appointment with an appropriate provider today for hospital discharge.       Plan: Care Coordination referral for TCM outreach canceled.    Corry Rush RN  [facility], [facility]

## 2021-07-26 NOTE — NURSING NOTE
"Oncology Rooming Note    July 26, 2021 12:53 PM   Jennifer Cervantes is a 21 year old female who presents for:    Chief Complaint   Patient presents with     Port Draw     Labs drawn via port by RN in lab. Vs taken.      Oncology Clinic Visit     Return; Sickle cell.      Initial Vitals: /88   Pulse 109   Temp 98.9  F (37.2  C) (Oral)   Resp 16   Wt 76.2 kg (168 lb 1.6 oz)   SpO2 93%   BMI 28.85 kg/m   Estimated body mass index is 28.85 kg/m  as calculated from the following:    Height as of 7/12/21: 1.626 m (5' 4\").    Weight as of this encounter: 76.2 kg (168 lb 1.6 oz). Body surface area is 1.85 meters squared.  Extreme Pain (8) Comment: Data Unavailable   No LMP recorded. Patient has had an injection.  Allergies reviewed: Yes  Medications reviewed: Yes    Medications: MEDICATION REFILLS NEEDED TODAY. Provider was notified.  Pharmacy name entered into [employer]    [facility] - [place], [place] - [address]  [facility][id number] - [facility] [address] AT SEC OF [facility] - [place], [place] - [phone]  [id number] [address] [place] [address] AT [facility] #[id number] [facility] [address] AT [address] OF [address] [facility] - [place], [place] - [address] [place] [address]    Clinical concerns: Need refill on OxyContin.      Diana Yun,   (Student MA)        Patient's rooming completed by Mayra Stone MA.      All steps completed per rooming protocol.    Syl Park CMA (AAMA)            "

## 2021-07-26 NOTE — LETTER
"    7/26/2021         RE: Jennifer Cervantes  [address]  [facility] [address]        Dear Colleague,    Thank you for referring your patient, Jennifer Cervantes, to the [facility]. Please see a copy of my visit note below.    Sickle Cell Outpatient Follow Up Visit      Date of encounter: Jul 26, 2021    Jennifer Cervantes is a 21 year old female who is being seen in person for hospital follow up and health maintenance related to SCD      Interval History: Jennifer was admitted 7/13-7/25 for acute on chronic PE and pain. She still seems to be having breakthrough DVT/PE despite having therapeutic DOAC levels on multiple drugs, so we switched her to dabigatran during her admission. She was also restarted on ASA during this admission. Her pain is still present but much better than several days ago. She did not wake up feeling like she needed an infusion. There are plans, which we still want to follow up on, to have her come to the clinic daily x 2 weeks for opioid infusions to see if we can keep her out of the hospital.    On the hospital note, she wanted to share her side of the story from the ED and give context to the situation. (Of note, I had strongly recommended she avoid cursing and being argumentative while she was in the hospital, which she did not take kindly to, since she rightly pointed out I did not have her full side of the story). She said that from the week ~7/10-13, she had been to the ED 3 times. In these situations, particularly on 7/11-12, she had been ignored in her opinion despite needing help, and was told \"everyone was too busy\" even though she said she could see several nursing staff at the desk. She was also still dyspneic and had only found that nebulizers helped at home. In the ED, she had waited 2 hours for pain medications and was having pleurisy (later found to have new PE) but the nurse insisted on doing an inhaler first. Jennifer insisted that the inhalers didn't work " earlier in the day and nebs were the key but the nurse pushed back. At that point (and Jennifer admits to having a temper when feeling bad), she argued back with the nurse. Since she felt no one would give her a nebulizer and she could not breathe comfortably, she refused the VQ scan and went home to use her home nebulizer. This context was not documented.    She then was seen in clinic the next day and was now hypoxic and still in pain. She received morphine x2 in clinic and then went to the ED. She received morphine about 75 minutes after admission but then there were 3 hours in between at which point she received a PCA. She was still very frustrated from the days prior and had a difficult encounter with her nurse. Jennifer had been there with her mom and when Jennifer said she would reach out to her mom given some of the disrespect she felt, she overheard the nurse make a disparaging remark about her mom. This was on top of the context (which we were not privy to until much later) that her stepfather had  just a few days before, so she was very emotional. She then waited 25 hours before admission with a PCA having been started around 3.5 hours in her ED stay but she rarely saw anyone later. Given the convergence of factors, she was frustrated about being stuck in the ED and this was documented in the EMR, though there was not clear documentation that anyone asked about her frustration triggers.    Now, 2 weeks later, her dyspnea is improved and she is tolerating the dabigatran. She still wants to wean opioids but does need more Oxycontin at this point.     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 She also has significant anxiety and depression with a strong anxiety about transferring to the adult clinic. She usually has pain crises secondary to the anxiety.      --------------------------------  Plan last reviewed with patient: 7/26/2021    Patient background: 22yo F, enjoys movies and kids though there are times where she does not really want to talk to people. Does not have a lot of social support at home.     Sickle Cell Disease History  Primary Hematologist: Perla  PCP: Raul  Genotype: SS  Acute Pain Crisis Treatment:    ER/Acute Care/Infusion Clinic:   o Morphine 2 mg IVP/SC Q1H X 3 doses  o Toradol x1   o Maintenance IV fluids with LR  o Other: Benadryl PO and Zofran 8 mg IV PRN itching or nausea    Inpatient:  o Opioid: Morphine 2 mg IV Q1H PRN until PCA starts  o PCA plan:   - PCA button dose: Morphine 1 mg  - Lockout: 20 minutes  - Continuous Infusion: consider 1 mg/hr  - One hr max: 4 mg  o Other Medications: Benadryl, Zofran  o If PCA not working, she Has had success with ketamine starting at 4mg/h and advancing only to 6mg/h, as 8mg/h made her feel quite poorly.  o ASA back on 7/2021  o Supportive Care: Docusate, Senna  Chronic Pain Medications:    Home regimen Oxycontin 10 mg q12h, oxycodone 15 mg p.o. q.4-6 hours p.r.n. breakthrough pain.  She also continues on Voltaren gel, and Zoloft among other medications.    -Also benefits from mental health visits, acupuncture  Baseline Hemoglobin: 7 g/dl without chronic transfusions  Hydroxyurea use: Yes  H/O blood transfusions: Yes, several (iron overload) Most recent 7/13/2021    H/O Transfusion Reactions: no    Antibodies:none  H/O Acute Chest Syndrome: Yes    Last episode: 10/2019     ICU/intubation: unsure  H/O Stroke: Yes (managed with chronic transfusions in the past, stopped late Spring 2020)  H/O VTE: Yes (2/2021)  H/O Cholecystectomy or Splenectomy: no  H/O Asthma, Pulm HTN, AVN, Leg Ulcers, Nephropathy, Retinopathy,  etc: Iron overload, asthma, chronic lung disease, physical limitations from early stroke     -------------------------------------------    Sickle Cell Disease Comprehensive Checklist    Bone Health/Avascular Necrosis Screening/Symptoms (each visit): no new concerns today    Leg Ulcer evaluation (every visit): none    Hypertension (every visit): borderline hypertensive recently but improved later in evening and previous day on most of ED visits    Last pulmonary evaluation (asthma, AMAN, pulm HTN): within last year( due again)    Stroke/silent cerebral infarct Hx (Y/N): Yes TIA ~2014, first event ~age 1 with full stroke and R sided weakness    Last PCP Visit: 8/2020    Vaccines:  o PCV13: 5/13/19  o Pneumovax (PPSV23): 3/04, 10/09, 7/12/19 (next due 7/2024)  o Menactra: 4/2010, 9/2015 (MCV overdue Sept 2020)  o Influenza: got 20-21 vaccine per self report    Audiology (chelation): done 6/2020, normal (due again)    Past Medical History:  Past Medical History:   Diagnosis Date     Anxiety      Bleeding disorder (H)      Blood clotting disorder (H)      Cerebral infarction (H) 2015     Cognitive developmental delay     low IQ. 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 (H)     right hand contractures     Iron overload due to repeated red blood cell transfusions      Migraines      Multiple subsegmental pulmonary emboli without acute cor pulmonale (H) 02/01/2021     Oppositional defiant behavior      Superficial venous thrombosis of arm, right 03/25/2021     Uncomplicated asthma        Past Surgical History:  Past Surgical History:   Procedure Laterality Date     AS INSERT TUNNELED CV 2 CATH W/O PORT/PUMP       C BREAST REDUCTION (INCLUDES LIPO) TIER 3 Bilateral 04/23/2019     CHOLECYSTECTOMY       INSERT PORT VASCULAR ACCESS Left 4/21/2021    Procedure: INSERTION, VASCULAR ACCESS PORT (NOT  "SURE ON SIDE UNTIL REMOVAL);  Surgeon: Rajan More MD;  Location: [facility] OR     IR CHEST PORT PLACEMENT > 5 YRS OF AGE  4/21/2021     IR CVC NON TUNNEL LINE REMOVAL  5/6/2021     IR CVC NON TUNNEL PLACEMENT  04/07/2020     IR CVC NON TUNNEL PLACEMENT  4/30/2021     IR PORT REMOVAL LEFT  4/21/2021     REMOVE PORT VASCULAR ACCESS Left 4/21/2021    Procedure: REMOVAL, VASCULAR ACCESS PORT LEFT;  Surgeon: Rajan More MD;  Location: [facility] OR     REPAIR TENDON ELBOW Right 10/02/2019    Procedure: Right Elbow Flexor Lengthening, Flexor Pronator Slide Of Wrist and Finger, Thumb Adductor Lengthening;  Surgeon: Anai Franco MD;  Location: [facility] OR     TONSILLECTOMY Bilateral 10/02/2019    Procedure: Bilateral Tonsillectomy;  Surgeon: Farhana Guy MD;  Location: [facility] OR       Family History:   Family History   Problem Relation Age of Onset     Sickle Cell Trait Mother      Hypertension Mother      Asthma Mother      Sickle Cell Trait Father        Social History:  Social History     Socioeconomic History     Marital status: Single     Spouse name: Not on file     Number of children: Not on file     Years of education: Not on file     Highest education level: Not on file   Occupational History     Not on file   Tobacco Use     Smoking status: Never Smoker     Smokeless tobacco: Never Used   Substance and Sexual Activity     Alcohol use: Not Currently     Alcohol/week: 0.0 standard drinks     Drug use: Never     Sexual activity: Not Currently     Partners: Male     Birth control/protection: Other   Other Topics Concern     Parent/sibling w/ CABG, MI or angioplasty before 65F 55M? Not Asked   Social History Narrative    Lives with mom and extended family but \"toxic environment\" per her report. She would like to move out but cannot financially do so. She has minimal support at home despite her significant SCD comorbidities and cognitive delay from stroke.     Social Determinants of Health     Financial " Resource Strain:      Difficulty of Paying Living Expenses:    Food Insecurity:      Worried About Running Out of Food in the Last Year:      Ran Out of Food in the Last Year:    Transportation Needs:      Lack of Transportation (Medical):      Lack of Transportation (Non-Medical):    Physical Activity:      Days of Exercise per Week:      Minutes of Exercise per Session:    Stress:      Feeling of Stress :    Social Connections:      Frequency of Communication with Friends and Family:      Frequency of Social Gatherings with Friends and Family:      Attends Mandaen Services:      Active Member of Clubs or Organizations:      Attends Club or Organization Meetings:      Marital Status:    Intimate Partner Violence:      Fear of Current or Ex-Partner:      Emotionally Abused:      Physically Abused:      Sexually Abused:        Medications:  Current Outpatient Medications   Medication     acetaminophen (TYLENOL) 325 MG tablet     albuterol (PROAIR HFA/PROVENTIL HFA/VENTOLIN HFA) 108 (90 Base) MCG/ACT inhaler     albuterol (PROVENTIL) (2.5 MG/3ML) 0.083% neb solution     ARIPiprazole (ABILIFY) 2 MG tablet     aspirin (ASA) 81 MG chewable tablet     budesonide-formoterol (SYMBICORT) 160-4.5 MCG/ACT Inhaler     dabigatran ANTICOAGULANT (PRADAXA) 150 MG capsule     diphenhydrAMINE (BENADRYL) 25 MG capsule     EPINEPHrine (ANY BX GENERIC EQUIV) 0.3 MG/0.3ML injection 2-pack     Hydroxyurea 1000 MG TABS     hydrOXYzine (ATARAX) 25 MG tablet     JADENU 360 MG tablet     levofloxacin (LEVAQUIN) 500 MG tablet     lidocaine-prilocaine (EMLA) 2.5-2.5 % external cream     naloxone (NARCAN) 4 MG/0.1ML nasal spray     ondansetron (ZOFRAN) 8 MG tablet     oxyCODONE (OXYCONTIN) 10 MG 12 hr tablet     oxyCODONE IR (ROXICODONE) 15 MG tablet     sertraline (ZOLOFT) 100 MG tablet     diclofenac (VOLTAREN) 1 % topical gel     medroxyPROGESTERone (DEPO-PROVERA) 150 MG/ML IM injection     Current Facility-Administered Medications    Medication     medroxyPROGESTERone (DEPO-PROVERA) syringe 150 mg         Physical Exam:   /88   Pulse 109   Temp 98.9  F (37.2  C) (Oral)   Resp 16   Wt 76.2 kg (168 lb 1.6 oz)   SpO2 93%   BMI 28.85 kg/m       GEN: young  female, crying and emotional most of the visit, sharing her frustrations with the system  HEENT: slight icterus, MMM  RESP: speaking in full sentences through tears, no increased work of breathing, clear to auscultation  SKIN: no bruising noted  NEURO: alert and oriented      Labs:   Results for HIMANSHU AL (MRN [id number]) as of 7/26/2021 23:45   Ref. Range 7/26/2021 12:39   Sodium Latest Ref Range: 133 - 144 mmol/L 140   Potassium Latest Ref Range: 3.4 - 5.3 mmol/L 3.4   Chloride Latest Ref Range: 94 - 109 mmol/L 114 (H)   Carbon Dioxide Latest Ref Range: 20 - 32 mmol/L 24   Urea Nitrogen Latest Ref Range: 7 - 30 mg/dL 5 (L)   Creatinine Latest Ref Range: 0.52 - 1.04 mg/dL 0.52   GFR Estimate Latest Ref Range: >60 mL/min/1.73m2 >90   Calcium Latest Ref Range: 8.5 - 10.1 mg/dL 8.8   Anion Gap Latest Ref Range: 3 - 14 mmol/L 2 (L)   Albumin Latest Ref Range: 3.4 - 5.0 g/dL 3.8   Protein Total Latest Ref Range: 6.8 - 8.8 g/dL 7.6   Bilirubin Total Latest Ref Range: 0.2 - 1.3 mg/dL 3.4 (H)   Alkaline Phosphatase Latest Ref Range: 40 - 150 U/L 69   ALT Latest Ref Range: 0 - 50 U/L 72 (H)   AST Latest Ref Range: 0 - 45 U/L 102 (H)   Ferritin Latest Ref Range: 12 - 150 ng/mL 10,384 (H)   Glucose Latest Ref Range: 70 - 99 mg/dL 82   WBC Latest Ref Range: 4.0 - 11.0 10e3/uL 9.9   Hemoglobin Latest Ref Range: 11.7 - 15.7 g/dL 8.2 (L)   Hematocrit Latest Ref Range: 35.0 - 47.0 % 23.5 (L)   Platelet Count Latest Ref Range: 150 - 450 10e3/uL 384   RBC Count Latest Ref Range: 3.80 - 5.20 10e6/uL 2.48 (L)   MCV Latest Ref Range: 78 - 100 fL 95   MCH Latest Ref Range: 26.5 - 33.0 pg 33.1 (H)   MCHC Latest Ref Range: 31.5 - 36.5 g/dL 34.9   RDW Latest Ref Range: 10.0 - 15.0 %  27.1 (H)   % Neutrophils Latest Units: % 77   % Lymphocytes Latest Units: % 16   % Monocytes Latest Units: % 0   % Eosinophils Latest Units: % 5   Absolute Basophils Latest Ref Range: 0.0 - 0.2 10e3/uL 0.2   % Basophils Latest Units: % 2   NRBC/W Latest Ref Range: <=0 % 84 (H)   Absolute Neutrophil Latest Ref Range: 1.6 - 8.3 10e3/uL 7.6   Absolute Lymphocytes Latest Ref Range: 0.8 - 5.3 10e3/uL 1.6   Absolute Monocytes Latest Ref Range: 0.0 - 1.3 10e3/uL 0.0   Absolute Eosinophils Latest Ref Range: 0.0 - 0.7 10e3/uL 0.5   Absolute NRBCs Latest Ref Range: <=0.0 10e3/uL 8.3 (H)   RBC Morphology Unknown Confirmed RBC Indices   Platelet Morphology Latest Ref Range: Automated Count Confirmed. Platelet morphology is normal.  Automated Count Confirmed. Platelet morphology is normal.   Polychromasia Latest Ref Range: None Seen  Moderate (A)   Sickle Cells Latest Ref Range: None Seen  Marked (A)     Imaging:   none     ----------    Assessment:  Jennifer Cervantes is a 21 year old female with HbSS. Her disease has been complicated by stroke leading to some slight cognitive delay and right sided hemiparesis, high anxiety leading to frequent pain crises on top of chronic pain syndrome, poor social structure at home with no real support, and iron overload secondary to repeated transfusions.    Much of this visit was focused on her frustrations with her care, particularly in the ED, and the larger issue that her voice is not heard, particularly on the EMR. She said the only side that gets shared is the medical provider side (nurse, MD, etc) and she feels that no one wants to hear her side (this is a sentiment shared by other individuals with SCD as well). I tried to elaborate on her experiences as much as possible above but her passionate frustration was more evident than most days and tells me that we in our clinic and as a system are not supporting our patients well. I have tried to share her story and my own frustration that ad  hominem attacks and characterizations documented in the EMR are largely unhelpful as they only serve to develop preconceived notions for patients who I would argue need more support, not apathy , when they are seen.     She has found that two nurses, Kilo and Dejuan, really go out of there way to support her and she is hopeful that they can be the primary nurses to care for her next time she is in the ED.     Major Topics of the Visit:  1. Pain Management: Updated pain plan. We discussed that we will continue with the planned daily infusions x 2 weeks to see if we can stabilize things. I also think that it is admirable that she wants to get off the opioids. I recommended that we try to titrate the IR oxycodone first rather than taking away the basal Oxycontin in order to allow some low-level background analgesic     -Crizanlizumab monthly (started June 2021)   2. Iron overload/Pharmacy Issues: She had Jadenu ordered a few months ago but there have been difficulties getting it at refill time. She is now on Desferal as well. LIC was 43 (3/2021) so we need to act urgently. Desferal HD over 48 hours is going well. Synthetic function for the liver seems to be intact  Repeat Ferriscan to be rescheduled as she was inpatient for that visit. Needs audiology follow up as well, last done June 2020.  3. High RBC turnover: Jennifer really has a high degree of RBC turnover, more than most patients I have seen. Oxybryta led to more frequent pain episodes (chest pain, which was new) and did not change the RBC turnover so it was stopped in December. No change currently  4. Pulmonary Emboli + new RUE DVT (innominate vein) of unclear chronicity + new symptomatic R cephalic SVT this week: Now on dabigatran after having DVT/PE while on apixaban and rivaroxaban. Her levels have been therapeutic so it is odd that her metabolism of drugs is less predictable. We did start her back on ASA. She may have some pulmonary HTN with her recurrent chest  discomfort and known PEs. Will work to get her back in to see pulmonology since she missed a visit last month  5. Stroke sequelae on right side: Follow up with  PM&R to see if they may be able to better assist with her weakness. I do not think a surgical approach is warranted at this point. This visit has not yet occurred  Follow up: daily infusions, seeing me in 3 weeks.    Review of inpatient care as documented above   Review of the result(s) of each unique test - labs as above  Diagnosis or treatment significantly limited by social determinants of health - sickle cell disease, racial inequity, difficult social situation at home  Ordering of specific tests      Erci Duncan MD  Hematologist  Division of Hematology, Oncology, and Transplantation  [facility]  [facility]  Pager: [phone]              Again, thank you for allowing me to participate in the care of your patient.        Sincerely,        Eric Duncan MD

## 2021-07-26 NOTE — PROGRESS NOTES
Social Work Follow-Up  [facility]    Data/Intervention:  Patient Name:  Jennifer Cervantes  /Age:  2000 (21 year old)    Reason for Follow-Up:  Transportation     Collaborated With:    -Elroy Rodriguez RN  -[employer]  -Patient    Intervention/Education/Resources Provided:  SW received phone call from Elroy Rodriguez RN stating that pt is needing assistance for their appointment today. Jennifer believes that this appointment was set up in advanced and pt was having difficulty getting a ride. SW explained that they can help this one time, but for future appointments pt needs to make prior arrangements. SW can assist with rides for same day appointments. Jennifer stated that they relayed this information to the pt. SW used a taxi voucher and arranged ride through [employer] who will be dispatched shortly to take pt to their appointment. SW called pt to give them their ride details, but unable to reach them. SW left a detailed message and asked them to call back with any questions.     Assessment/Plan:  SW will remain available as needed.  Previously provided patient/family with writer's contact information and availability.       CARLOS Chavez,UDAY  Hematology/Oncology Social Worker  Phone:[phone] Pager: [phone]

## 2021-07-26 NOTE — PROGRESS NOTES
Infusion Nursing Note:  Jennifer Cervantes presents today for IVF-Pain Meds.    Patient seen by provider today: Yes: Dr Duncan   present during visit today: Not Applicable.    Note: Pt saw provider prior to infusion, ok for treatment.    TORB 7/26/21 @1500 Dr Duncan-Patricia Coronado, RN  --Do not leave PORT needle accessed overnight this week 7/26-7/30, unsure if she will come to all infusions.    Pt requesting home infusion place a PIV instead of using her PORT for home Desferal tomorrow.  Teams message sent to [facility] at [facility] who will contact pt's nurse with request.    5/10 pain at the end of infusion, pt feels ok to discharge.    Intravenous Access:  Implanted Port.    Treatment Conditions:  Lab Results   Component Value Date    HGB 8.2 07/26/2021    HGB 7.8 07/10/2021     Lab Results   Component Value Date    WBC 9.9 07/26/2021    WBC 15.8 07/10/2021      Lab Results   Component Value Date    ANEU 7.6 07/26/2021    ANEU 8.7 07/10/2021     Lab Results   Component Value Date     07/26/2021     07/10/2021      Lab Results   Component Value Date     07/26/2021     07/10/2021                   Lab Results   Component Value Date    POTASSIUM 3.4 07/26/2021    POTASSIUM 3.9 07/10/2021           Lab Results   Component Value Date    MAG 1.7 02/21/2021            Lab Results   Component Value Date    CR 0.52 07/26/2021    CR 0.50 07/10/2021                   Lab Results   Component Value Date    MICAH 8.8 07/26/2021    MICAH 8.4 07/10/2021                Lab Results   Component Value Date    BILITOTAL 3.4 07/26/2021    BILITOTAL 3.6 07/10/2021           Lab Results   Component Value Date    ALBUMIN 3.8 07/26/2021    ALBUMIN 4.0 07/10/2021                    Lab Results   Component Value Date    ALT 72 07/26/2021    ALT 70 07/10/2021           Lab Results   Component Value Date     07/26/2021    AST 73 07/10/2021           Post Infusion Assessment:  Patient tolerated infusion without  incident.  Blood return noted pre and post infusion.  Site patent and intact, free from redness, edema or discomfort.  No evidence of extravasations.  Access discontinued per protocol.       Discharge Plan:   Prescription refills given for Oxy.  Discharge instructions reviewed with: Patient.  Patient and/or family verbalized understanding of discharge instructions and all questions answered.  AVS to patient via [employer].  Patient will return 7/27 for next appointment.   Patient discharged in stable condition accompanied by: self.  Departure Mode: Ambulatory.    Patricia Coronado RN

## 2021-07-26 NOTE — LETTER
"    7/26/2021         RE: Jennifer Cervantes  [address]  [facility] [address]      Sickle Cell Outpatient Follow Up Visit      Date of encounter: Jul 26, 2021    Jennifer Cervantes is a 21 year old female who is being seen in person for hospital follow up and health maintenance related to SCD      Interval History: Jennifer was admitted 7/13-7/25 for acute on chronic PE and pain. She still seems to be having breakthrough DVT/PE despite having therapeutic DOAC levels on multiple drugs, so we switched her to dabigatran during her admission. She was also restarted on ASA during this admission. Her pain is still present but much better than several days ago. She did not wake up feeling like she needed an infusion. There are plans, which we still want to follow up on, to have her come to the clinic daily x 2 weeks for opioid infusions to see if we can keep her out of the hospital.    On the hospital note, she wanted to share her side of the story from the ED and give context to the situation. (Of note, I had strongly recommended she avoid cursing and being argumentative while she was in the hospital, which she did not take kindly to, since she rightly pointed out I did not have her full side of the story). She said that from the week ~7/10-13, she had been to the ED 3 times. In these situations, particularly on 7/11-12, she had been ignored in her opinion despite needing help, and was told \"everyone was too busy\" even though she said she could see several nursing staff at the desk. She was also still dyspneic and had only found that nebulizers helped at home. In the ED, she had waited 2 hours for pain medications and was having pleurisy (later found to have new PE) but the nurse insisted on doing an inhaler first. Jennifer insisted that the inhalers didn't work earlier in the day and nebs were the key but the nurse pushed back. At that point (and Jennifer admits to having a temper when feeling bad), she argued back with the " nurse. Since she felt no one would give her a nebulizer and she could not breathe comfortably, she refused the VQ scan and went home to use her home nebulizer. This context was not documented.    She then was seen in clinic the next day and was now hypoxic and still in pain. She received morphine x2 in clinic and then went to the ED. She received morphine about 75 minutes after admission but then there were 3 hours in between at which point she received a PCA. She was still very frustrated from the days prior and had a difficult encounter with her nurse. Jennifer had been there with her mom and when Jennifer said she would reach out to her mom given some of the disrespect she felt, she overheard the nurse make a disparaging remark about her mom. This was on top of the context (which we were not privy to until much later) that her stepfather had  just a few days before, so she was very emotional. She then waited 25 hours before admission with a PCA having been started around 3.5 hours in her ED stay but she rarely saw anyone later. Given the convergence of factors, she was frustrated about being stuck in the ED and this was documented in the EMR, though there was not clear documentation that anyone asked about her frustration triggers.    Now, 2 weeks later, her dyspnea is improved and she is tolerating the dabigatran. She still wants to wean opioids but does need more Oxycontin at this point.     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 She also has significant anxiety and depression with a strong anxiety about transferring to the adult clinic. She usually has pain crises secondary  to the anxiety.      --------------------------------  Plan last reviewed with patient: 7/26/2021    Patient background: 20yo F, enjoys movies and kids though there are times where she does not really want to talk to people. Does not have a lot of social support at home.     Sickle Cell Disease History  Primary Hematologist: Perla  PCP: Raul  Genotype: SS  Acute Pain Crisis Treatment:    ER/Acute Care/Infusion Clinic:   o Morphine 2 mg IVP/SC Q1H X 3 doses  o Toradol x1   o Maintenance IV fluids with LR  o Other: Benadryl PO and Zofran 8 mg IV PRN itching or nausea    Inpatient:  o Opioid: Morphine 2 mg IV Q1H PRN until PCA starts  o PCA plan:   - PCA button dose: Morphine 1 mg  - Lockout: 20 minutes  - Continuous Infusion: consider 1 mg/hr  - One hr max: 4 mg  o Other Medications: Benadryl, Zofran  o If PCA not working, she Has had success with ketamine starting at 4mg/h and advancing only to 6mg/h, as 8mg/h made her feel quite poorly.  o ASA back on 7/2021  o Supportive Care: Docusate, Senna  Chronic Pain Medications:    Home regimen Oxycontin 10 mg q12h, oxycodone 15 mg p.o. q.4-6 hours p.r.n. breakthrough pain.  She also continues on Voltaren gel, and Zoloft among other medications.    -Also benefits from mental health visits, acupuncture  Baseline Hemoglobin: 7 g/dl without chronic transfusions  Hydroxyurea use: Yes  H/O blood transfusions: Yes, several (iron overload) Most recent 7/13/2021    H/O Transfusion Reactions: no    Antibodies:none  H/O Acute Chest Syndrome: Yes    Last episode: 10/2019     ICU/intubation: unsure  H/O Stroke: Yes (managed with chronic transfusions in the past, stopped late Spring 2020)  H/O VTE: Yes (2/2021)  H/O Cholecystectomy or Splenectomy: no  H/O Asthma, Pulm HTN, AVN, Leg Ulcers, Nephropathy, Retinopathy, etc: Iron overload, asthma, chronic lung disease, physical limitations from early stroke     -------------------------------------------    Sickle Cell Disease  Comprehensive Checklist    Bone Health/Avascular Necrosis Screening/Symptoms (each visit): no new concerns today    Leg Ulcer evaluation (every visit): none    Hypertension (every visit): borderline hypertensive recently but improved later in evening and previous day on most of ED visits    Last pulmonary evaluation (asthma, AMAN, pulm HTN): within last year( due again)    Stroke/silent cerebral infarct Hx (Y/N): Yes TIA ~2014, first event ~age 1 with full stroke and R sided weakness    Last PCP Visit: 8/2020    Vaccines:  o PCV13: 5/13/19  o Pneumovax (PPSV23): 3/04, 10/09, 7/12/19 (next due 7/2024)  o Menactra: 4/2010, 9/2015 (MCV overdue Sept 2020)  o Influenza: got 20-21 vaccine per self report    Audiology (chelation): done 6/2020, normal (due again)    Past Medical History:  Past Medical History:   Diagnosis Date     Anxiety      Bleeding disorder (H)      Blood clotting disorder (H)      Cerebral infarction (H) 2015     Cognitive developmental delay     low IQ. 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 (H)     right hand contractures     Iron overload due to repeated red blood cell transfusions      Migraines      Multiple subsegmental pulmonary emboli without acute cor pulmonale (H) 02/01/2021     Oppositional defiant behavior      Superficial venous thrombosis of arm, right 03/25/2021     Uncomplicated asthma        Past Surgical History:  Past Surgical History:   Procedure Laterality Date     AS INSERT TUNNELED CV 2 CATH W/O PORT/PUMP       C BREAST REDUCTION (INCLUDES LIPO) TIER 3 Bilateral 04/23/2019     CHOLECYSTECTOMY       INSERT PORT VASCULAR ACCESS Left 4/21/2021    Procedure: INSERTION, VASCULAR ACCESS PORT (NOT SURE ON SIDE UNTIL REMOVAL);  Surgeon: Rajan More MD;  Location: [facility] OR     IR CHEST PORT PLACEMENT > 5 YRS OF AGE  4/21/2021     IR CVC NON TUNNEL LINE  "REMOVAL  5/6/2021     IR CVC NON TUNNEL PLACEMENT  04/07/2020     IR CVC NON TUNNEL PLACEMENT  4/30/2021     IR PORT REMOVAL LEFT  4/21/2021     REMOVE PORT VASCULAR ACCESS Left 4/21/2021    Procedure: REMOVAL, VASCULAR ACCESS PORT LEFT;  Surgeon: Rajan More MD;  Location: [facility] OR     REPAIR TENDON ELBOW Right 10/02/2019    Procedure: Right Elbow Flexor Lengthening, Flexor Pronator Slide Of Wrist and Finger, Thumb Adductor Lengthening;  Surgeon: Anai Franco MD;  Location: [facility] OR     TONSILLECTOMY Bilateral 10/02/2019    Procedure: Bilateral Tonsillectomy;  Surgeon: Farhana Guy MD;  Location: [facility] OR       Family History:   Family History   Problem Relation Age of Onset     Sickle Cell Trait Mother      Hypertension Mother      Asthma Mother      Sickle Cell Trait Father        Social History:  Social History     Socioeconomic History     Marital status: Single     Spouse name: Not on file     Number of children: Not on file     Years of education: Not on file     Highest education level: Not on file   Occupational History     Not on file   Tobacco Use     Smoking status: Never Smoker     Smokeless tobacco: Never Used   Substance and Sexual Activity     Alcohol use: Not Currently     Alcohol/week: 0.0 standard drinks     Drug use: Never     Sexual activity: Not Currently     Partners: Male     Birth control/protection: Other   Other Topics Concern     Parent/sibling w/ CABG, MI or angioplasty before 65F 55M? Not Asked   Social History Narrative    Lives with mom and extended family but \"toxic environment\" per her report. She would like to move out but cannot financially do so. She has minimal support at home despite her significant SCD comorbidities and cognitive delay from stroke.     Social Determinants of Health     Financial Resource Strain:      Difficulty of Paying Living Expenses:    Food Insecurity:      Worried About Running Out of Food in the Last Year:      Ran Out of Food in " the Last Year:    Transportation Needs:      Lack of Transportation (Medical):      Lack of Transportation (Non-Medical):    Physical Activity:      Days of Exercise per Week:      Minutes of Exercise per Session:    Stress:      Feeling of Stress :    Social Connections:      Frequency of Communication with Friends and Family:      Frequency of Social Gatherings with Friends and Family:      Attends Gnosticist Services:      Active Member of Clubs or Organizations:      Attends Club or Organization Meetings:      Marital Status:    Intimate Partner Violence:      Fear of Current or Ex-Partner:      Emotionally Abused:      Physically Abused:      Sexually Abused:        Medications:  Current Outpatient Medications   Medication     acetaminophen (TYLENOL) 325 MG tablet     albuterol (PROAIR HFA/PROVENTIL HFA/VENTOLIN HFA) 108 (90 Base) MCG/ACT inhaler     albuterol (PROVENTIL) (2.5 MG/3ML) 0.083% neb solution     ARIPiprazole (ABILIFY) 2 MG tablet     aspirin (ASA) 81 MG chewable tablet     budesonide-formoterol (SYMBICORT) 160-4.5 MCG/ACT Inhaler     dabigatran ANTICOAGULANT (PRADAXA) 150 MG capsule     diphenhydrAMINE (BENADRYL) 25 MG capsule     EPINEPHrine (ANY BX GENERIC EQUIV) 0.3 MG/0.3ML injection 2-pack     Hydroxyurea 1000 MG TABS     hydrOXYzine (ATARAX) 25 MG tablet     JADENU 360 MG tablet     levofloxacin (LEVAQUIN) 500 MG tablet     lidocaine-prilocaine (EMLA) 2.5-2.5 % external cream     naloxone (NARCAN) 4 MG/0.1ML nasal spray     ondansetron (ZOFRAN) 8 MG tablet     oxyCODONE (OXYCONTIN) 10 MG 12 hr tablet     oxyCODONE IR (ROXICODONE) 15 MG tablet     sertraline (ZOLOFT) 100 MG tablet     diclofenac (VOLTAREN) 1 % topical gel     medroxyPROGESTERone (DEPO-PROVERA) 150 MG/ML IM injection     Current Facility-Administered Medications   Medication     medroxyPROGESTERone (DEPO-PROVERA) syringe 150 mg         Physical Exam:   /88   Pulse 109   Temp 98.9  F (37.2  C) (Oral)   Resp 16   Wt 76.2  kg (168 lb 1.6 oz)   SpO2 93%   BMI 28.85 kg/m       GEN: young  female, crying and emotional most of the visit, sharing her frustrations with the system  HEENT: slight icterus, MMM  RESP: speaking in full sentences through tears, no increased work of breathing, clear to auscultation  SKIN: no bruising noted  NEURO: alert and oriented      Labs:   Results for HIMANSHU AL (MRN [id number]) as of 7/26/2021 23:45   Ref. Range 7/26/2021 12:39   Sodium Latest Ref Range: 133 - 144 mmol/L 140   Potassium Latest Ref Range: 3.4 - 5.3 mmol/L 3.4   Chloride Latest Ref Range: 94 - 109 mmol/L 114 (H)   Carbon Dioxide Latest Ref Range: 20 - 32 mmol/L 24   Urea Nitrogen Latest Ref Range: 7 - 30 mg/dL 5 (L)   Creatinine Latest Ref Range: 0.52 - 1.04 mg/dL 0.52   GFR Estimate Latest Ref Range: >60 mL/min/1.73m2 >90   Calcium Latest Ref Range: 8.5 - 10.1 mg/dL 8.8   Anion Gap Latest Ref Range: 3 - 14 mmol/L 2 (L)   Albumin Latest Ref Range: 3.4 - 5.0 g/dL 3.8   Protein Total Latest Ref Range: 6.8 - 8.8 g/dL 7.6   Bilirubin Total Latest Ref Range: 0.2 - 1.3 mg/dL 3.4 (H)   Alkaline Phosphatase Latest Ref Range: 40 - 150 U/L 69   ALT Latest Ref Range: 0 - 50 U/L 72 (H)   AST Latest Ref Range: 0 - 45 U/L 102 (H)   Ferritin Latest Ref Range: 12 - 150 ng/mL 10,384 (H)   Glucose Latest Ref Range: 70 - 99 mg/dL 82   WBC Latest Ref Range: 4.0 - 11.0 10e3/uL 9.9   Hemoglobin Latest Ref Range: 11.7 - 15.7 g/dL 8.2 (L)   Hematocrit Latest Ref Range: 35.0 - 47.0 % 23.5 (L)   Platelet Count Latest Ref Range: 150 - 450 10e3/uL 384   RBC Count Latest Ref Range: 3.80 - 5.20 10e6/uL 2.48 (L)   MCV Latest Ref Range: 78 - 100 fL 95   MCH Latest Ref Range: 26.5 - 33.0 pg 33.1 (H)   MCHC Latest Ref Range: 31.5 - 36.5 g/dL 34.9   RDW Latest Ref Range: 10.0 - 15.0 % 27.1 (H)   % Neutrophils Latest Units: % 77   % Lymphocytes Latest Units: % 16   % Monocytes Latest Units: % 0   % Eosinophils Latest Units: % 5   Absolute Basophils Latest  Ref Range: 0.0 - 0.2 10e3/uL 0.2   % Basophils Latest Units: % 2   NRBC/W Latest Ref Range: <=0 % 84 (H)   Absolute Neutrophil Latest Ref Range: 1.6 - 8.3 10e3/uL 7.6   Absolute Lymphocytes Latest Ref Range: 0.8 - 5.3 10e3/uL 1.6   Absolute Monocytes Latest Ref Range: 0.0 - 1.3 10e3/uL 0.0   Absolute Eosinophils Latest Ref Range: 0.0 - 0.7 10e3/uL 0.5   Absolute NRBCs Latest Ref Range: <=0.0 10e3/uL 8.3 (H)   RBC Morphology Unknown Confirmed RBC Indices   Platelet Morphology Latest Ref Range: Automated Count Confirmed. Platelet morphology is normal.  Automated Count Confirmed. Platelet morphology is normal.   Polychromasia Latest Ref Range: None Seen  Moderate (A)   Sickle Cells Latest Ref Range: None Seen  Marked (A)     Imaging:   none     ----------    Assessment:  Jennifer Cervantes is a 21 year old female with HbSS. Her disease has been complicated by stroke leading to some slight cognitive delay and right sided hemiparesis, high anxiety leading to frequent pain crises on top of chronic pain syndrome, poor social structure at home with no real support, and iron overload secondary to repeated transfusions.    Much of this visit was focused on her frustrations with her care, particularly in the ED, and the larger issue that her voice is not heard, particularly on the EMR. She said the only side that gets shared is the medical provider side (nurse, MD, etc) and she feels that no one wants to hear her side (this is a sentiment shared by other individuals with SCD as well). I tried to elaborate on her experiences as much as possible above but her passionate frustration was more evident than most days and tells me that we in our clinic and as a system are not supporting our patients well. I have tried to share her story and my own frustration that ad hominem attacks and characterizations documented in the EMR are largely unhelpful as they only serve to develop preconceived notions for patients who I would argue need more  support, not apathy , when they are seen.     She has found that two nurses, Kilo and Dejuan, really go out of there way to support her and she is hopeful that they can be the primary nurses to care for her next time she is in the ED.     Major Topics of the Visit:  1. Pain Management: Updated pain plan. We discussed that we will continue with the planned daily infusions x 2 weeks to see if we can stabilize things. I also think that it is admirable that she wants to get off the opioids. I recommended that we try to titrate the IR oxycodone first rather than taking away the basal Oxycontin in order to allow some low-level background analgesic     -Crizanlizumab monthly (started June 2021)   2. Iron overload/Pharmacy Issues: She had Jadenu ordered a few months ago but there have been difficulties getting it at refill time. She is now on Desferal as well. LIC was 43 (3/2021) so we need to act urgently. Desferal HD over 48 hours is going well. Synthetic function for the liver seems to be intact  Repeat Ferriscan to be rescheduled as she was inpatient for that visit. Needs audiology follow up as well, last done June 2020.  3. High RBC turnover: Jennifer really has a high degree of RBC turnover, more than most patients I have seen. Oxybryta led to more frequent pain episodes (chest pain, which was new) and did not change the RBC turnover so it was stopped in December. No change currently  4. Pulmonary Emboli + new RUE DVT (innominate vein) of unclear chronicity + new symptomatic R cephalic SVT this week: Now on dabigatran after having DVT/PE while on apixaban and rivaroxaban. Her levels have been therapeutic so it is odd that her metabolism of drugs is less predictable. We did start her back on ASA. She may have some pulmonary HTN with her recurrent chest discomfort and known PEs. Will work to get her back in to see pulmonology since she missed a visit last month  5. Stroke sequelae on right side: Follow up with  PM&R to see  if they may be able to better assist with her weakness. I do not think a surgical approach is warranted at this point. This visit has not yet occurred  Follow up: daily infusions, seeing me in 3 weeks.    Review of inpatient care as documented above   Review of the result(s) of each unique test - labs as above  Diagnosis or treatment significantly limited by social determinants of health - sickle cell disease, racial inequity, difficult social situation at home  Ordering of specific tests      Eric Duncan MD  Hematologist  Division of Hematology, Oncology, and Transplantation  [facility]  [facility] [place]  Pager: [phone]                Eric Duncan MD

## 2021-07-27 ENCOUNTER — TELEPHONE (OUTPATIENT)
Dept: ONCOLOGY | Facility: CLINIC | Age: 21
End: 2021-07-27

## 2021-07-27 ENCOUNTER — HOME INFUSION (PRE-WILLOW HOME INFUSION) (OUTPATIENT)
Dept: PHARMACY | Facility: CLINIC | Age: 21
End: 2021-07-27

## 2021-07-27 ENCOUNTER — INFUSION THERAPY VISIT (OUTPATIENT)
Dept: ONCOLOGY | Facility: CLINIC | Age: 21
End: 2021-07-27
Attending: PEDIATRICS
Payer: COMMERCIAL

## 2021-07-27 VITALS
TEMPERATURE: 98.4 F | SYSTOLIC BLOOD PRESSURE: 130 MMHG | RESPIRATION RATE: 16 BRPM | DIASTOLIC BLOOD PRESSURE: 78 MMHG | OXYGEN SATURATION: 97 % | HEART RATE: 85 BPM

## 2021-07-27 DIAGNOSIS — D57.00 SICKLE CELL PAIN CRISIS (H): Primary | ICD-10-CM

## 2021-07-27 PROCEDURE — 258N000003 HC RX IP 258 OP 636: Performed by: PEDIATRICS

## 2021-07-27 PROCEDURE — 96376 TX/PRO/DX INJ SAME DRUG ADON: CPT

## 2021-07-27 PROCEDURE — 96374 THER/PROPH/DIAG INJ IV PUSH: CPT

## 2021-07-27 PROCEDURE — 96361 HYDRATE IV INFUSION ADD-ON: CPT

## 2021-07-27 PROCEDURE — 250N000011 HC RX IP 250 OP 636: Performed by: PEDIATRICS

## 2021-07-27 RX ORDER — ONDANSETRON 8 MG/1
8 TABLET, FILM COATED ORAL
Status: CANCELLED
Start: 2021-07-30

## 2021-07-27 RX ORDER — DIPHENHYDRAMINE HCL 25 MG
25 CAPSULE ORAL
Status: CANCELLED
Start: 2021-07-30

## 2021-07-27 RX ORDER — MORPHINE SULFATE 2 MG/ML
2 INJECTION, SOLUTION INTRAMUSCULAR; INTRAVENOUS
Status: CANCELLED
Start: 2021-07-30

## 2021-07-27 RX ORDER — MORPHINE SULFATE 2 MG/ML
2 INJECTION, SOLUTION INTRAMUSCULAR; INTRAVENOUS
Status: COMPLETED | OUTPATIENT
Start: 2021-07-27 | End: 2021-07-27

## 2021-07-27 RX ORDER — HEPARIN SODIUM,PORCINE 10 UNIT/ML
5 VIAL (ML) INTRAVENOUS
Status: CANCELLED | OUTPATIENT
Start: 2021-07-30

## 2021-07-27 RX ORDER — HEPARIN SODIUM (PORCINE) LOCK FLUSH IV SOLN 100 UNIT/ML 100 UNIT/ML
5 SOLUTION INTRAVENOUS
Status: CANCELLED | OUTPATIENT
Start: 2021-07-30

## 2021-07-27 RX ORDER — HEPARIN SODIUM,PORCINE 10 UNIT/ML
5 VIAL (ML) INTRAVENOUS
Status: DISCONTINUED | OUTPATIENT
Start: 2021-07-27 | End: 2021-07-27 | Stop reason: HOSPADM

## 2021-07-27 RX ADMIN — MORPHINE SULFATE 2 MG: 2 INJECTION, SOLUTION INTRAMUSCULAR; INTRAVENOUS at 17:03

## 2021-07-27 RX ADMIN — DEXTROSE AND SODIUM CHLORIDE 500 ML: 5; 450 INJECTION, SOLUTION INTRAVENOUS at 15:06

## 2021-07-27 RX ADMIN — Medication 5 ML: at 17:16

## 2021-07-27 RX ADMIN — MORPHINE SULFATE 2 MG: 2 INJECTION, SOLUTION INTRAMUSCULAR; INTRAVENOUS at 16:04

## 2021-07-27 RX ADMIN — MORPHINE SULFATE 2 MG: 2 INJECTION, SOLUTION INTRAMUSCULAR; INTRAVENOUS at 15:06

## 2021-07-27 NOTE — TELEPHONE ENCOUNTER
Received message that [facility] is requesting to teach pt how to de-access port after Desferal home infusions, this is per pt's request as she does not want nursing to come back out to the home.     Called Anyi back and reached brennon baptiste that pt will possibly require more then one teach back to ensure she understands how to de-access port and use clean technique accurately as she has some learning disabilities. LM for call back with any questions, forwarded message to Dr. Duncan.

## 2021-07-27 NOTE — PROGRESS NOTES
"Sickle Cell Outpatient Follow Up Visit      Date of encounter: Jul 26, 2021    Jennifer Cervantes is a 21 year old female who is being seen in person for hospital follow up and health maintenance related to SCD      Interval History: Jennifer was admitted 7/13-7/25 for acute on chronic PE and pain. She still seems to be having breakthrough DVT/PE despite having therapeutic DOAC levels on multiple drugs, so we switched her to dabigatran during her admission. She was also restarted on ASA during this admission. Her pain is still present but much better than several days ago. She did not wake up feeling like she needed an infusion. There are plans, which we still want to follow up on, to have her come to the clinic daily x 2 weeks for opioid infusions to see if we can keep her out of the hospital.    On the hospital note, she wanted to share her side of the story from the ED and give context to the situation. (Of note, I had strongly recommended she avoid cursing and being argumentative while she was in the hospital, which she did not take kindly to, since she rightly pointed out I did not have her full side of the story). She said that from the week ~7/10-13, she had been to the ED 3 times. In these situations, particularly on 7/11-12, she had been ignored in her opinion despite needing help, and was told \"everyone was too busy\" even though she said she could see several nursing staff at the desk. She was also still dyspneic and had only found that nebulizers helped at home. In the ED, she had waited 2 hours for pain medications and was having pleurisy (later found to have new PE) but the nurse insisted on doing an inhaler first. Jennifer insisted that the inhalers didn't work earlier in the day and nebs were the key but the nurse pushed back. At that point (and Jennifer admits to having a temper when feeling bad), she argued back with the nurse. Since she felt no one would give her a nebulizer and she could not breathe " comfortably, she refused the VQ scan and went home to use her home nebulizer. This context was not documented.    She then was seen in clinic the next day and was now hypoxic and still in pain. She received morphine x2 in clinic and then went to the ED. She received morphine about 75 minutes after admission but then there were 3 hours in between at which point she received a PCA. She was still very frustrated from the days prior and had a difficult encounter with her nurse. Jennifer had been there with her mom and when Jennifer said she would reach out to her mom given some of the disrespect she felt, she overheard the nurse make a disparaging remark about her mom. This was on top of the context (which we were not privy to until much later) that her stepfather had  just a few days before, so she was very emotional. She then waited 25 hours before admission with a PCA having been started around 3.5 hours in her ED stay but she rarely saw anyone later. Given the convergence of factors, she was frustrated about being stuck in the ED and this was documented in the EMR, though there was not clear documentation that anyone asked about her frustration triggers.    Now, 2 weeks later, her dyspnea is improved and she is tolerating the dabigatran. She still wants to wean opioids but does need more Oxycontin at this point.     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 She also has significant anxiety and depression with a strong anxiety about transferring to the adult clinic. She usually has pain crises secondary to the anxiety.      --------------------------------  Plan last reviewed with  patient: 7/26/2021    Patient background: 22yo F, enjoys movies and kids though there are times where she does not really want to talk to people. Does not have a lot of social support at home.     Sickle Cell Disease History  Primary Hematologist: Perla  PCP: Raul  Genotype: SS  Acute Pain Crisis Treatment:    ER/Acute Care/Infusion Clinic:   o Morphine 2 mg IVP/SC Q1H X 3 doses  o Toradol x1   o Maintenance IV fluids with LR  o Other: Benadryl PO and Zofran 8 mg IV PRN itching or nausea    Inpatient:  o Opioid: Morphine 2 mg IV Q1H PRN until PCA starts  o PCA plan:   - PCA button dose: Morphine 1 mg  - Lockout: 20 minutes  - Continuous Infusion: consider 1 mg/hr  - One hr max: 4 mg  o Other Medications: Benadryl, Zofran  o If PCA not working, she Has had success with ketamine starting at 4mg/h and advancing only to 6mg/h, as 8mg/h made her feel quite poorly.  o ASA back on 7/2021  o Supportive Care: Docusate, Senna  Chronic Pain Medications:    Home regimen Oxycontin 10 mg q12h, oxycodone 15 mg p.o. q.4-6 hours p.r.n. breakthrough pain.  She also continues on Voltaren gel, and Zoloft among other medications.    -Also benefits from mental health visits, acupuncture  Baseline Hemoglobin: 7 g/dl without chronic transfusions  Hydroxyurea use: Yes  H/O blood transfusions: Yes, several (iron overload) Most recent 7/13/2021    H/O Transfusion Reactions: no    Antibodies:none  H/O Acute Chest Syndrome: Yes    Last episode: 10/2019     ICU/intubation: unsure  H/O Stroke: Yes (managed with chronic transfusions in the past, stopped late Spring 2020)  H/O VTE: Yes (2/2021)  H/O Cholecystectomy or Splenectomy: no  H/O Asthma, Pulm HTN, AVN, Leg Ulcers, Nephropathy, Retinopathy, etc: Iron overload, asthma, chronic lung disease, physical limitations from early stroke     -------------------------------------------    Sickle Cell Disease Comprehensive Checklist    Bone Health/Avascular Necrosis Screening/Symptoms (each  visit): no new concerns today    Leg Ulcer evaluation (every visit): none    Hypertension (every visit): borderline hypertensive recently but improved later in evening and previous day on most of ED visits    Last pulmonary evaluation (asthma, AMAN, pulm HTN): within last year( due again)    Stroke/silent cerebral infarct Hx (Y/N): Yes TIA ~2014, first event ~age 1 with full stroke and R sided weakness    Last PCP Visit: 8/2020    Vaccines:  o PCV13: 5/13/19  o Pneumovax (PPSV23): 3/04, 10/09, 7/12/19 (next due 7/2024)  o Menactra: 4/2010, 9/2015 (MCV overdue Sept 2020)  o Influenza: got 20-21 vaccine per self report    Audiology (chelation): done 6/2020, normal (due again)    Past Medical History:  Past Medical History:   Diagnosis Date     Anxiety      Bleeding disorder (H)      Blood clotting disorder (H)      Cerebral infarction (H) 2015     Cognitive developmental delay     low IQ. 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 (H)     right hand contractures     Iron overload due to repeated red blood cell transfusions      Migraines      Multiple subsegmental pulmonary emboli without acute cor pulmonale (H) 02/01/2021     Oppositional defiant behavior      Superficial venous thrombosis of arm, right 03/25/2021     Uncomplicated asthma        Past Surgical History:  Past Surgical History:   Procedure Laterality Date     AS INSERT TUNNELED CV 2 CATH W/O PORT/PUMP       C BREAST REDUCTION (INCLUDES LIPO) TIER 3 Bilateral 04/23/2019     CHOLECYSTECTOMY       INSERT PORT VASCULAR ACCESS Left 4/21/2021    Procedure: INSERTION, VASCULAR ACCESS PORT (NOT SURE ON SIDE UNTIL REMOVAL);  Surgeon: Rajan More MD;  Location: [facility] OR     IR CHEST PORT PLACEMENT > 5 YRS OF AGE  4/21/2021     IR CVC NON TUNNEL LINE REMOVAL  5/6/2021     IR CVC NON TUNNEL PLACEMENT  04/07/2020     IR CVC NON TUNNEL  "PLACEMENT  4/30/2021     IR PORT REMOVAL LEFT  4/21/2021     REMOVE PORT VASCULAR ACCESS Left 4/21/2021    Procedure: REMOVAL, VASCULAR ACCESS PORT LEFT;  Surgeon: Rajan More MD;  Location: [facility] OR     REPAIR TENDON ELBOW Right 10/02/2019    Procedure: Right Elbow Flexor Lengthening, Flexor Pronator Slide Of Wrist and Finger, Thumb Adductor Lengthening;  Surgeon: Anai Franco MD;  Location: [facility] OR     TONSILLECTOMY Bilateral 10/02/2019    Procedure: Bilateral Tonsillectomy;  Surgeon: Farhana Guy MD;  Location: [facility] OR       Family History:   Family History   Problem Relation Age of Onset     Sickle Cell Trait Mother      Hypertension Mother      Asthma Mother      Sickle Cell Trait Father        Social History:  Social History     Socioeconomic History     Marital status: Single     Spouse name: Not on file     Number of children: Not on file     Years of education: Not on file     Highest education level: Not on file   Occupational History     Not on file   Tobacco Use     Smoking status: Never Smoker     Smokeless tobacco: Never Used   Substance and Sexual Activity     Alcohol use: Not Currently     Alcohol/week: 0.0 standard drinks     Drug use: Never     Sexual activity: Not Currently     Partners: Male     Birth control/protection: Other   Other Topics Concern     Parent/sibling w/ CABG, MI or angioplasty before 65F 55M? Not Asked   Social History Narrative    Lives with mom and extended family but \"toxic environment\" per her report. She would like to move out but cannot financially do so. She has minimal support at home despite her significant SCD comorbidities and cognitive delay from stroke.     Social Determinants of Health     Financial Resource Strain:      Difficulty of Paying Living Expenses:    Food Insecurity:      Worried About Running Out of Food in the Last Year:      Ran Out of Food in the Last Year:    Transportation Needs:      Lack of Transportation (Medical):      " Lack of Transportation (Non-Medical):    Physical Activity:      Days of Exercise per Week:      Minutes of Exercise per Session:    Stress:      Feeling of Stress :    Social Connections:      Frequency of Communication with Friends and Family:      Frequency of Social Gatherings with Friends and Family:      Attends Hinduism Services:      Active Member of Clubs or Organizations:      Attends Club or Organization Meetings:      Marital Status:    Intimate Partner Violence:      Fear of Current or Ex-Partner:      Emotionally Abused:      Physically Abused:      Sexually Abused:        Medications:  Current Outpatient Medications   Medication     acetaminophen (TYLENOL) 325 MG tablet     albuterol (PROAIR HFA/PROVENTIL HFA/VENTOLIN HFA) 108 (90 Base) MCG/ACT inhaler     albuterol (PROVENTIL) (2.5 MG/3ML) 0.083% neb solution     ARIPiprazole (ABILIFY) 2 MG tablet     aspirin (ASA) 81 MG chewable tablet     budesonide-formoterol (SYMBICORT) 160-4.5 MCG/ACT Inhaler     dabigatran ANTICOAGULANT (PRADAXA) 150 MG capsule     diphenhydrAMINE (BENADRYL) 25 MG capsule     EPINEPHrine (ANY BX GENERIC EQUIV) 0.3 MG/0.3ML injection 2-pack     Hydroxyurea 1000 MG TABS     hydrOXYzine (ATARAX) 25 MG tablet     JADENU 360 MG tablet     levofloxacin (LEVAQUIN) 500 MG tablet     lidocaine-prilocaine (EMLA) 2.5-2.5 % external cream     naloxone (NARCAN) 4 MG/0.1ML nasal spray     ondansetron (ZOFRAN) 8 MG tablet     oxyCODONE (OXYCONTIN) 10 MG 12 hr tablet     oxyCODONE IR (ROXICODONE) 15 MG tablet     sertraline (ZOLOFT) 100 MG tablet     diclofenac (VOLTAREN) 1 % topical gel     medroxyPROGESTERone (DEPO-PROVERA) 150 MG/ML IM injection     Current Facility-Administered Medications   Medication     medroxyPROGESTERone (DEPO-PROVERA) syringe 150 mg         Physical Exam:   /88   Pulse 109   Temp 98.9  F (37.2  C) (Oral)   Resp 16   Wt 76.2 kg (168 lb 1.6 oz)   SpO2 93%   BMI 28.85 kg/m       GEN: young   female, crying and emotional most of the visit, sharing her frustrations with the system  HEENT: slight icterus, MMM  RESP: speaking in full sentences through tears, no increased work of breathing, clear to auscultation  SKIN: no bruising noted  NEURO: alert and oriented      Labs:   Results for HIMANSHU AL (MRN [id number]) as of 7/26/2021 23:45   Ref. Range 7/26/2021 12:39   Sodium Latest Ref Range: 133 - 144 mmol/L 140   Potassium Latest Ref Range: 3.4 - 5.3 mmol/L 3.4   Chloride Latest Ref Range: 94 - 109 mmol/L 114 (H)   Carbon Dioxide Latest Ref Range: 20 - 32 mmol/L 24   Urea Nitrogen Latest Ref Range: 7 - 30 mg/dL 5 (L)   Creatinine Latest Ref Range: 0.52 - 1.04 mg/dL 0.52   GFR Estimate Latest Ref Range: >60 mL/min/1.73m2 >90   Calcium Latest Ref Range: 8.5 - 10.1 mg/dL 8.8   Anion Gap Latest Ref Range: 3 - 14 mmol/L 2 (L)   Albumin Latest Ref Range: 3.4 - 5.0 g/dL 3.8   Protein Total Latest Ref Range: 6.8 - 8.8 g/dL 7.6   Bilirubin Total Latest Ref Range: 0.2 - 1.3 mg/dL 3.4 (H)   Alkaline Phosphatase Latest Ref Range: 40 - 150 U/L 69   ALT Latest Ref Range: 0 - 50 U/L 72 (H)   AST Latest Ref Range: 0 - 45 U/L 102 (H)   Ferritin Latest Ref Range: 12 - 150 ng/mL 10,384 (H)   Glucose Latest Ref Range: 70 - 99 mg/dL 82   WBC Latest Ref Range: 4.0 - 11.0 10e3/uL 9.9   Hemoglobin Latest Ref Range: 11.7 - 15.7 g/dL 8.2 (L)   Hematocrit Latest Ref Range: 35.0 - 47.0 % 23.5 (L)   Platelet Count Latest Ref Range: 150 - 450 10e3/uL 384   RBC Count Latest Ref Range: 3.80 - 5.20 10e6/uL 2.48 (L)   MCV Latest Ref Range: 78 - 100 fL 95   MCH Latest Ref Range: 26.5 - 33.0 pg 33.1 (H)   MCHC Latest Ref Range: 31.5 - 36.5 g/dL 34.9   RDW Latest Ref Range: 10.0 - 15.0 % 27.1 (H)   % Neutrophils Latest Units: % 77   % Lymphocytes Latest Units: % 16   % Monocytes Latest Units: % 0   % Eosinophils Latest Units: % 5   Absolute Basophils Latest Ref Range: 0.0 - 0.2 10e3/uL 0.2   % Basophils Latest Units: % 2   NRBC/W Latest  Ref Range: <=0 % 84 (H)   Absolute Neutrophil Latest Ref Range: 1.6 - 8.3 10e3/uL 7.6   Absolute Lymphocytes Latest Ref Range: 0.8 - 5.3 10e3/uL 1.6   Absolute Monocytes Latest Ref Range: 0.0 - 1.3 10e3/uL 0.0   Absolute Eosinophils Latest Ref Range: 0.0 - 0.7 10e3/uL 0.5   Absolute NRBCs Latest Ref Range: <=0.0 10e3/uL 8.3 (H)   RBC Morphology Unknown Confirmed RBC Indices   Platelet Morphology Latest Ref Range: Automated Count Confirmed. Platelet morphology is normal.  Automated Count Confirmed. Platelet morphology is normal.   Polychromasia Latest Ref Range: None Seen  Moderate (A)   Sickle Cells Latest Ref Range: None Seen  Marked (A)     Imaging:   none     ----------    Assessment:  Jennifer Cervantes is a 21 year old female with HbSS. Her disease has been complicated by stroke leading to some slight cognitive delay and right sided hemiparesis, high anxiety leading to frequent pain crises on top of chronic pain syndrome, poor social structure at home with no real support, and iron overload secondary to repeated transfusions.    Much of this visit was focused on her frustrations with her care, particularly in the ED, and the larger issue that her voice is not heard, particularly on the EMR. She said the only side that gets shared is the medical provider side (nurse, MD, etc) and she feels that no one wants to hear her side (this is a sentiment shared by other individuals with SCD as well). I tried to elaborate on her experiences as much as possible above but her passionate frustration was more evident than most days and tells me that we in our clinic and as a system are not supporting our patients well. I have tried to share her story and my own frustration that ad hominem attacks and characterizations documented in the EMR are largely unhelpful as they only serve to develop preconceived notions for patients who I would argue need more support, not apathy , when they are seen.     She has found that two nurses, Kilo  and Dejuan, really go out of there way to support her and she is hopeful that they can be the primary nurses to care for her next time she is in the ED.     Major Topics of the Visit:  1. Pain Management: Updated pain plan. We discussed that we will continue with the planned daily infusions x 2 weeks to see if we can stabilize things. I also think that it is admirable that she wants to get off the opioids. I recommended that we try to titrate the IR oxycodone first rather than taking away the basal Oxycontin in order to allow some low-level background analgesic     -Crizanlizumab monthly (started June 2021)   2. Iron overload/Pharmacy Issues: She had Jadenu ordered a few months ago but there have been difficulties getting it at refill time. She is now on Desferal as well. LIC was 43 (3/2021) so we need to act urgently. Desferal HD over 48 hours is going well. Synthetic function for the liver seems to be intact  Repeat Ferriscan to be rescheduled as she was inpatient for that visit. Needs audiology follow up as well, last done June 2020.  3. High RBC turnover: Jennifer really has a high degree of RBC turnover, more than most patients I have seen. Oxybryta led to more frequent pain episodes (chest pain, which was new) and did not change the RBC turnover so it was stopped in December. No change currently  4. Pulmonary Emboli + new RUE DVT (innominate vein) of unclear chronicity + new symptomatic R cephalic SVT this week: Now on dabigatran after having DVT/PE while on apixaban and rivaroxaban. Her levels have been therapeutic so it is odd that her metabolism of drugs is less predictable. We did start her back on ASA. She may have some pulmonary HTN with her recurrent chest discomfort and known PEs. Will work to get her back in to see pulmonology since she missed a visit last month  5. Stroke sequelae on right side: Follow up with  PM&R to see if they may be able to better assist with her weakness. I do not think a surgical  approach is warranted at this point. This visit has not yet occurred  Follow up: daily infusions, seeing me in 3 weeks.    Review of inpatient care as documented above   Review of the result(s) of each unique test - labs as above  Diagnosis or treatment significantly limited by social determinants of health - sickle cell disease, racial inequity, difficult social situation at home  Ordering of specific tests      Eric Duncan MD  Hematologist  Division of Hematology, Oncology, and Transplantation  [facility]  [facility]  Pager: [phone]

## 2021-07-27 NOTE — PROGRESS NOTES
Infusion Nursing Note:  Jennifer Cervantes presents today for IVF/pain meds.    Patient seen by provider today: No   present during visit today: Not Applicable.    Note: Patient presents to infusion with 9/10 low back discomfort. Heat/cold therapy offered during infusion but declined. Infusion suite had bed so patient utilized repositioning throughout shift. Patient otherwise denies acute complaints or concerns needing to be addressed today. Specifically, patient denies s/s of infection such as fever, sore throat, cough, chest pain, shortness of breath, or changes in taste/smell.      Intravenous Access:  Implanted Port.    Treatment Conditions:  N/A.      Post Infusion Assessment:  Patient tolerated infusion without incident.  Patient rated pain 5/10 post IVFs and 3 doses of IV Morphine, stating she was comfortable going home  Blood return noted pre and post infusion.  Site patent and intact, free from redness, edema or discomfort.  No evidence of extravasations.  Access discontinued per protocol.       Discharge Plan:   Patient declined prescription refills.  Discharge instructions reviewed with: Patient.  Patient and/or family verbalized understanding of discharge instructions and all questions answered.  AVS to patient via [email or web address].  Patient will return 7/28 for next appointment.   Patient discharged in stable condition accompanied by: self.  Departure Mode: Ambulatory.  Face to Face time: 0 minutes.      Rik Ayers RN

## 2021-07-27 NOTE — PATIENT INSTRUCTIONS
[facility] Triage and after hours / weekends / holidays:  [phone]    Please call the triage or after hours line if you experience a temperature greater than or equal to 100.5, shaking chills, have uncontrolled nausea, vomiting and/or diarrhea, dizziness, shortness of breath, chest pain, bleeding, unexplained bruising, or if you have any other new/concerning symptoms, questions or concerns.      If you are having any concerning symptoms or wish to speak to a provider before your next infusion visit, please call your care coordinator or triage to notify them so we can adequately serve you.     If you need a refill on a narcotic prescription or other medication, please call before your infusion appointment.                 July 2021 Sunday Monday Tuesday Wednesday Thursday Friday Saturday                       1     2    LAB CENTRAL   7:30 AM   (15 min.)    [facility] DRAW   [facility]    ONC INFUSION 3 HR (180 MIN)   8:00 AM   (180 min.)    ONC INFUSION NURSE   [facility] 3    Admission   1:49 AM   Court Ring MD   [facility]   (Discharge: 7/3/2021)   4    Admission   4:21 AM   Court Ring MD   [facility]   (Discharge: 7/4/2021) 5    Admission   2:44 AM   Andrew Chou MD   [facility]   (Discharge: 7/5/2021) 6    ONC INFUSION 2 HR (120 MIN)  11:30 AM   (120 min.)    ONC INFUSION NURSE   [facility] 7     8    Admission   3:07 AM   Jamee Martin MD   [facility]   (Discharge: 7/8/2021)    US ABDOMEN COMPLETE   5:45 AM   (40 min.)   [id number]   [facility] 9    NEW   2:45 PM   (60 min.)   Katherine Pierce MD   [facility] 10    Admission   3:29 AM   Francesco Green MD   [facility]   (Discharge: 7/10/2021)    CT ABDOMEN PELVIS WO   6:35 AM   (20 min.)   [id number]   [facility]   11     12    Admission   2:08 AM   [facility] Department   (Discharge: 7/12/2021)    XR CHEST 2 VIEWS   4:15 AM   (20 min.)   [email or web address]   [facility]    VIDEO VISIT RETURN  12:45 PM   (30 min.)   Eric Duncan MD   [facility] 13    SPEC INFUSION 3 HR (180 MIN)  10:00 AM   (180 min.)   [facility] SIPC INFUSION NURSE   [facility]    RETURN  11:00 AM   (45 min.)   Andrei Machado PA   [facility]    XR CHEST 2 VIEWS   3:15 PM   (20 min.)   [email or web address]   [facility] [place]    Admission   4:28 PM   Alhaji Winters MD   [facility] Unit [address] [place] [address]   (Discharge: 7/25/2021)    XR CHEST PORT 1 VIEW   4:35 PM   (20 min.)   [id number]   [facility]    NM LUNG SCAN VENT/PERF  10:30 PM   (60 min.)   [email or web address]   [facility] 14    XR CHEST PORT 1 VIEW   4:55 AM   (20 min.)   [id number]   [facility] 15     16     17       18     19     20    XR CHEST PORT 1 VIEW   1:15 PM   (20 min.)   [id number]   [facility] 21    XR CHEST PORT 1 VIEW   2:30 PM   (20 min.)   [id number]   [facility] 22     23    NEW   7:45 AM   (60 min.)   Katherine Pierce MD   [facility] 24       25     26    LAB CENTRAL  11:45 AM   (15 min.)    [facility]RAW   [facility]    RETURN  12:15 PM   (30 min.)   Eirc Duncan MD   [facility]    ONC INFUSION 2 HR (120 MIN)   2:30 PM   (120 min.)   UC ONC INFUSION NURSE   [facility] 27    ONC INFUSION 2 HR (120 MIN)   2:30 PM   (120 min.)    ONC INFUSION NURSE   [facility]  28    ONC INFUSION 2 HR (120 MIN)  12:00 PM   (120 min.)    ONC INFUSION NURSE   [facility]    NEW   3:45 PM   (60 min.)   Katherine Pierce MD   [facility] 29    ONC INFUSION 2 HR (120 MIN)   2:30 PM   (120 min.)   UC ONC INFUSION NURSE   [facility] 30    ONC INFUSION 3 HR (180 MIN)   2:30 PM   (180 min.)    ONC INFUSION NURSE   [facility] 31 August 2021 Sunday Monday Tuesday Wednesday Thursday Friday Saturday   1     2     3     4     5     6     7       8     9     10     11     12     13    US LWR EXT VENOUS DUPLEX LEFT   8:30 AM   (30 min.)   [email or web address]   [facility] 14       15     16    HAND INITIAL   8:45 AM   (60 min.)   Franchesca Tucker OT   [facility] [place]    VIDEO VISIT RETURN  10:15 AM   (30 min.)   Suraj Case MD   [facility] [place]    LAB CENTRAL  11:15 AM   (15 min.)   [facility]   [facility]    RETURN  11:45 AM   (30 min.)   Eric Duncan MD   [facility] 17     18    MR ABDOMEN WO   2:00 PM   (45 min.)   [email or web address]   [facility] 19     20     21       22     23     24     25    ONC INFUSION 3 HR (180 MIN)   7:00 AM   (180 min.)    ONC INFUSION NURSE   [facility] NURSE VISIT  10:00 AM   (30 min.)   Nurse, [facility] [place] 26     27     28       29     30     31                                         Lab Results:  No results found for this or any previous visit (from the past 12 hour(s)).

## 2021-07-28 ENCOUNTER — ONCOLOGY VISIT (OUTPATIENT)
Dept: ONCOLOGY | Facility: CLINIC | Age: 21
End: 2021-07-28
Attending: STUDENT IN AN ORGANIZED HEALTH CARE EDUCATION/TRAINING PROGRAM
Payer: COMMERCIAL

## 2021-07-28 ENCOUNTER — LAB (OUTPATIENT)
Dept: LAB | Facility: CLINIC | Age: 21
End: 2021-07-28
Attending: PEDIATRICS
Payer: COMMERCIAL

## 2021-07-28 ENCOUNTER — INFUSION THERAPY VISIT (OUTPATIENT)
Dept: ONCOLOGY | Facility: CLINIC | Age: 21
End: 2021-07-28
Attending: PEDIATRICS
Payer: COMMERCIAL

## 2021-07-28 VITALS
TEMPERATURE: 99.1 F | HEIGHT: 64 IN | HEART RATE: 101 BPM | WEIGHT: 170 LBS | OXYGEN SATURATION: 95 % | DIASTOLIC BLOOD PRESSURE: 80 MMHG | SYSTOLIC BLOOD PRESSURE: 119 MMHG | BODY MASS INDEX: 29.02 KG/M2

## 2021-07-28 VITALS
SYSTOLIC BLOOD PRESSURE: 131 MMHG | TEMPERATURE: 98.9 F | RESPIRATION RATE: 16 BRPM | BODY MASS INDEX: 28.84 KG/M2 | WEIGHT: 168 LBS | HEART RATE: 96 BPM | DIASTOLIC BLOOD PRESSURE: 75 MMHG | OXYGEN SATURATION: 98 %

## 2021-07-28 DIAGNOSIS — D57.1 HB-SS DISEASE WITHOUT CRISIS (H): Primary | ICD-10-CM

## 2021-07-28 DIAGNOSIS — I69.959 SPASTIC HEMIPLEGIA AS LATE EFFECT OF CEREBROVASCULAR DISEASE, UNSPECIFIED CEREBROVASCULAR DISEASE TYPE, UNSPECIFIED LATERALITY (H): ICD-10-CM

## 2021-07-28 DIAGNOSIS — Z86.73 HISTORY OF STROKE: ICD-10-CM

## 2021-07-28 DIAGNOSIS — D57.00 SICKLE CELL CRISIS (H): ICD-10-CM

## 2021-07-28 DIAGNOSIS — I69.351 HEMIPLEGIA AND HEMIPARESIS FOLLOWING CEREBRAL INFARCTION AFFECTING RIGHT DOMINANT SIDE (H): ICD-10-CM

## 2021-07-28 DIAGNOSIS — R25.2 SPASTICITY: ICD-10-CM

## 2021-07-28 DIAGNOSIS — D57.00 SICKLE CELL PAIN CRISIS (H): ICD-10-CM

## 2021-07-28 LAB
ALBUMIN SERPL-MCNC: 3.8 G/DL (ref 3.4–5)
ALP SERPL-CCNC: 62 U/L (ref 40–150)
ALT SERPL W P-5'-P-CCNC: 70 U/L (ref 0–50)
ANION GAP SERPL CALCULATED.3IONS-SCNC: 6 MMOL/L (ref 3–14)
AST SERPL W P-5'-P-CCNC: 86 U/L (ref 0–45)
BASOPHILS # BLD MANUAL: 0.1 10E3/UL (ref 0–0.2)
BASOPHILS NFR BLD MANUAL: 2 %
BILIRUB SERPL-MCNC: 2.5 MG/DL (ref 0.2–1.3)
BUN SERPL-MCNC: 7 MG/DL (ref 7–30)
BURR CELLS BLD QL SMEAR: SLIGHT
CALCIUM SERPL-MCNC: 8.6 MG/DL (ref 8.5–10.1)
CHLORIDE BLD-SCNC: 116 MMOL/L (ref 94–109)
CO2 SERPL-SCNC: 20 MMOL/L (ref 20–32)
CREAT SERPL-MCNC: 0.58 MG/DL (ref 0.52–1.04)
EOSINOPHIL # BLD MANUAL: 0.2 10E3/UL (ref 0–0.7)
EOSINOPHIL NFR BLD MANUAL: 3 %
ERYTHROCYTE [DISTWIDTH] IN BLOOD BY AUTOMATED COUNT: 25.2 % (ref 10–15)
FRAGMENTS BLD QL SMEAR: SLIGHT
GFR SERPL CREATININE-BSD FRML MDRD: >90 ML/MIN/1.73M2
GLUCOSE BLD-MCNC: 91 MG/DL (ref 70–99)
HCT VFR BLD AUTO: 23.5 % (ref 35–47)
HGB BLD-MCNC: 8.1 G/DL (ref 11.7–15.7)
LYMPHOCYTES # BLD MANUAL: 1.2 10E3/UL (ref 0.8–5.3)
LYMPHOCYTES NFR BLD MANUAL: 21 %
MCH RBC QN AUTO: 34.2 PG (ref 26.5–33)
MCHC RBC AUTO-ENTMCNC: 34.5 G/DL (ref 31.5–36.5)
MCV RBC AUTO: 99 FL (ref 78–100)
MONOCYTES # BLD MANUAL: 0.1 10E3/UL (ref 0–1.3)
MONOCYTES NFR BLD MANUAL: 2 %
NEUTROPHILS # BLD MANUAL: 4.2 10E3/UL (ref 1.6–8.3)
NEUTROPHILS NFR BLD MANUAL: 72 %
NRBC # BLD AUTO: 3.4 10E3/UL
NRBC BLD MANUAL-RTO: 57 %
PLAT MORPH BLD: ABNORMAL
PLATELET # BLD AUTO: 329 10E3/UL (ref 150–450)
POLYCHROMASIA BLD QL SMEAR: SLIGHT
POTASSIUM BLD-SCNC: 3.8 MMOL/L (ref 3.4–5.3)
PROT SERPL-MCNC: 7.6 G/DL (ref 6.8–8.8)
RBC # BLD AUTO: 2.37 10E6/UL (ref 3.8–5.2)
RBC MORPH BLD: ABNORMAL
RETICS # AUTO: 0.46 10E6/UL (ref 0.03–0.1)
RETICS/RBC NFR AUTO: 19.8 % (ref 0.5–2)
SICKLE CELLS BLD QL SMEAR: ABNORMAL
SODIUM SERPL-SCNC: 142 MMOL/L (ref 133–144)
TARGETS BLD QL SMEAR: SLIGHT
WBC # BLD AUTO: 5.9 10E3/UL (ref 4–11)

## 2021-07-28 PROCEDURE — 96376 TX/PRO/DX INJ SAME DRUG ADON: CPT

## 2021-07-28 PROCEDURE — 85045 AUTOMATED RETICULOCYTE COUNT: CPT | Performed by: PEDIATRICS

## 2021-07-28 PROCEDURE — 36415 COLL VENOUS BLD VENIPUNCTURE: CPT

## 2021-07-28 PROCEDURE — 85014 HEMATOCRIT: CPT

## 2021-07-28 PROCEDURE — G0463 HOSPITAL OUTPT CLINIC VISIT: HCPCS | Mod: 25

## 2021-07-28 PROCEDURE — 96375 TX/PRO/DX INJ NEW DRUG ADDON: CPT

## 2021-07-28 PROCEDURE — 258N000003 HC RX IP 258 OP 636: Performed by: PEDIATRICS

## 2021-07-28 PROCEDURE — 999N000127 HC STATISTIC PERIPHERAL IV START W US GUIDANCE

## 2021-07-28 PROCEDURE — 80053 COMPREHEN METABOLIC PANEL: CPT

## 2021-07-28 PROCEDURE — 96361 HYDRATE IV INFUSION ADD-ON: CPT

## 2021-07-28 PROCEDURE — 96365 THER/PROPH/DIAG IV INF INIT: CPT

## 2021-07-28 PROCEDURE — 250N000011 HC RX IP 250 OP 636: Performed by: PEDIATRICS

## 2021-07-28 PROCEDURE — 99204 OFFICE O/P NEW MOD 45 MIN: CPT | Performed by: STUDENT IN AN ORGANIZED HEALTH CARE EDUCATION/TRAINING PROGRAM

## 2021-07-28 RX ORDER — ALBUTEROL SULFATE 0.83 MG/ML
2.5 SOLUTION RESPIRATORY (INHALATION)
Status: CANCELLED | OUTPATIENT
Start: 2021-07-28

## 2021-07-28 RX ORDER — MEPERIDINE HYDROCHLORIDE 25 MG/ML
25 INJECTION INTRAMUSCULAR; INTRAVENOUS; SUBCUTANEOUS EVERY 30 MIN PRN
Status: CANCELLED | OUTPATIENT
Start: 2021-07-28

## 2021-07-28 RX ORDER — ALBUTEROL SULFATE 90 UG/1
1-2 AEROSOL, METERED RESPIRATORY (INHALATION)
Status: CANCELLED
Start: 2021-07-28

## 2021-07-28 RX ORDER — HEPARIN SODIUM (PORCINE) LOCK FLUSH IV SOLN 100 UNIT/ML 100 UNIT/ML
5 SOLUTION INTRAVENOUS
Status: CANCELLED | OUTPATIENT
Start: 2021-07-28

## 2021-07-28 RX ORDER — MORPHINE SULFATE 2 MG/ML
2 INJECTION, SOLUTION INTRAMUSCULAR; INTRAVENOUS
Status: COMPLETED | OUTPATIENT
Start: 2021-07-28 | End: 2021-07-28

## 2021-07-28 RX ORDER — ONDANSETRON 8 MG/1
8 TABLET, FILM COATED ORAL
Status: CANCELLED
Start: 2021-07-30

## 2021-07-28 RX ORDER — DIPHENHYDRAMINE HYDROCHLORIDE 50 MG/ML
50 INJECTION INTRAMUSCULAR; INTRAVENOUS
Status: CANCELLED
Start: 2021-07-28

## 2021-07-28 RX ORDER — MORPHINE SULFATE 2 MG/ML
2 INJECTION, SOLUTION INTRAMUSCULAR; INTRAVENOUS
Status: CANCELLED
Start: 2021-07-30

## 2021-07-28 RX ORDER — METHYLPREDNISOLONE SODIUM SUCCINATE 125 MG/2ML
125 INJECTION, POWDER, LYOPHILIZED, FOR SOLUTION INTRAMUSCULAR; INTRAVENOUS
Status: CANCELLED
Start: 2021-07-28

## 2021-07-28 RX ORDER — DIPHENHYDRAMINE HCL 25 MG
25 CAPSULE ORAL
Status: CANCELLED
Start: 2021-07-30

## 2021-07-28 RX ORDER — HEPARIN SODIUM,PORCINE 10 UNIT/ML
5 VIAL (ML) INTRAVENOUS
Status: CANCELLED | OUTPATIENT
Start: 2021-07-30

## 2021-07-28 RX ORDER — HEPARIN SODIUM (PORCINE) LOCK FLUSH IV SOLN 100 UNIT/ML 100 UNIT/ML
5 SOLUTION INTRAVENOUS
Status: CANCELLED | OUTPATIENT
Start: 2021-07-30

## 2021-07-28 RX ORDER — HEPARIN SODIUM,PORCINE 10 UNIT/ML
5 VIAL (ML) INTRAVENOUS
Status: CANCELLED | OUTPATIENT
Start: 2021-07-28

## 2021-07-28 RX ORDER — NALOXONE HYDROCHLORIDE 0.4 MG/ML
0.2 INJECTION, SOLUTION INTRAMUSCULAR; INTRAVENOUS; SUBCUTANEOUS
Status: CANCELLED | OUTPATIENT
Start: 2021-07-28

## 2021-07-28 RX ORDER — HEPARIN SODIUM (PORCINE) LOCK FLUSH IV SOLN 100 UNIT/ML 100 UNIT/ML
5 SOLUTION INTRAVENOUS
Status: DISCONTINUED | OUTPATIENT
Start: 2021-07-28 | End: 2021-07-28 | Stop reason: HOSPADM

## 2021-07-28 RX ORDER — EPINEPHRINE 1 MG/ML
0.3 INJECTION, SOLUTION INTRAMUSCULAR; SUBCUTANEOUS EVERY 5 MIN PRN
Status: CANCELLED | OUTPATIENT
Start: 2021-07-28

## 2021-07-28 RX ADMIN — SODIUM CHLORIDE 250 ML: 9 INJECTION, SOLUTION INTRAVENOUS at 13:05

## 2021-07-28 RX ADMIN — SODIUM CHLORIDE 400 MG: 9 INJECTION, SOLUTION INTRAVENOUS at 13:05

## 2021-07-28 RX ADMIN — MORPHINE SULFATE 2 MG: 2 INJECTION, SOLUTION INTRAMUSCULAR; INTRAVENOUS at 15:06

## 2021-07-28 RX ADMIN — DEXTROSE AND SODIUM CHLORIDE 500 ML: 5; 450 INJECTION, SOLUTION INTRAVENOUS at 12:29

## 2021-07-28 RX ADMIN — MORPHINE SULFATE 2 MG: 2 INJECTION, SOLUTION INTRAMUSCULAR; INTRAVENOUS at 12:31

## 2021-07-28 RX ADMIN — MORPHINE SULFATE 2 MG: 2 INJECTION, SOLUTION INTRAMUSCULAR; INTRAVENOUS at 14:03

## 2021-07-28 ASSESSMENT — PAIN SCALES - GENERAL
PAINLEVEL: NO PAIN (0)
PAINLEVEL: MODERATE PAIN (5)

## 2021-07-28 ASSESSMENT — MIFFLIN-ST. JEOR: SCORE: 1516.11

## 2021-07-28 NOTE — PROGRESS NOTES
"Infusion Nursing Note:  Jennifer Cervantes presents today for Crizanlizumab, IV Fluids and Pain Medications.    Patient seen by provider today: No   present during visit today: Not Applicable.    Note: Patient arrives to infusion today with 9/10 pain located in her lower back. States that this is her \"normal sickle cell pain\". Has not taken any pain medications yet today. Denies any acute complaints or concerns overnight such as recent fevers, chills, shortness of breath, chest pain or cough.     3 doses of IV morphine given for pain. Patient reports that pain is 6/10 at time of discharge and feels comfortable going home at this time. Crizanlizumab infused over 60 minutes per order as patient tolerates this better.     Intravenous Access:  Peripheral IV placed.  Implanted port had continuous home infusion of desferal running at this time.    Treatment Conditions:  Lab Results   Component Value Date    HGB 8.1 07/28/2021    HGB 7.8 07/10/2021     Lab Results   Component Value Date    WBC 5.9 07/28/2021    WBC 15.8 07/10/2021      Lab Results   Component Value Date    ANEU 4.2 07/28/2021    ANEU 8.7 07/10/2021     Lab Results   Component Value Date     07/28/2021     07/10/2021      Lab Results   Component Value Date     07/28/2021     07/10/2021                   Lab Results   Component Value Date    POTASSIUM 3.8 07/28/2021    POTASSIUM 3.9 07/10/2021           Lab Results   Component Value Date    MAG 1.7 02/21/2021            Lab Results   Component Value Date    CR 0.58 07/28/2021    CR 0.50 07/10/2021                   Lab Results   Component Value Date    MICAH 8.6 07/28/2021    MICAH 8.4 07/10/2021                Lab Results   Component Value Date    BILITOTAL 2.5 07/28/2021    BILITOTAL 3.6 07/10/2021           Lab Results   Component Value Date    ALBUMIN 3.8 07/28/2021    ALBUMIN 4.0 07/10/2021                    Lab Results   Component Value Date    ALT 70 07/28/2021    ALT 70 " 07/10/2021           Lab Results   Component Value Date    AST 86 07/28/2021    AST 73 07/10/2021       Results reviewed, labs MET treatment parameters, ok to proceed with treatment.  Biological Infusion Checklist:  ~~~ NOTE: If the patient answers yes to any of the questions below, hold the infusion and contact ordering provider or on-call provider.    1. Have you recently had an elevated temperature, fever, chills, productive cough, coughing for 3 weeks or longer or hemoptysis, abnormal vital signs, night sweats,  chest pain or have you noticed a decrease in your appetite, unexplained weight loss or fatigue? No  2. Do you have any open wounds or new incisions? No  3. Do you have any recent or upcoming hospitalizations, surgeries or dental procedures? No  4. Do you currently have or recently have had any signs of illness or infection or are you on any antibiotics? No  5. Have you had any new, sudden or worsening abdominal pain? No  6. Have you or anyone in your household received a live vaccination in the past 4 weeks? Please note:  No live vaccines while on biologic/chemotherapy until 6 months after the last treatment.  Patient can receive the flu vaccine (shot only) and the pneumovax.  It is optimal for the patient to get these vaccines mid cycle, but they can be given at any time as long as it is not on the day of the infusion. No  7. Have you recently been diagnosed with any new nervous system diseases (ie. Multiple sclerosis, Guillain Billings, seizures, neurological changes) or cancer diagnosis? No  8. Are you on any form of radiation or chemotherapy? No  9. Are you pregnant or breast feeding or do you have plans of pregnancy in the future? No  10. Have you been having any signs of worsening depression or suicidal ideations?  (benlysta only) No  11. Have there been any other new onset medical symptoms? No    Post Infusion Assessment:  Patient tolerated infusion without incident.  Patient observed for 30 minutes  post Crizanlizumab per protocol.  Blood return noted pre and post infusion.  Site patent and intact, free from redness, edema or discomfort.  No evidence of extravasations.  Access discontinued per protocol.     Discharge Plan:   Patient declined prescription refills.  Discharge instructions reviewed with: Patient.  Patient and/or family verbalized understanding of discharge instructions and all questions answered.  AVS to patient via [employer].  Patient will return tomorrow 7/29/2021 for next appointment.   Patient discharged in stable condition accompanied by: self.  Departure Mode: Ambulatory.      Maritza Bautista RN

## 2021-07-28 NOTE — NURSING NOTE
"Oncology Rooming Note    July 28, 2021 4:11 PM   Jennifer Cervantes is a 21 year old female who presents for:    Chief Complaint   Patient presents with     Oncology Clinic Visit     Sickle cell crisis      Initial Vitals: /80   Pulse 101   Temp 99.1  F (37.3  C)   Ht 1.626 m (5' 4\")   Wt 77.1 kg (170 lb)   SpO2 95%   BMI 29.18 kg/m   Estimated body mass index is 29.18 kg/m  as calculated from the following:    Height as of this encounter: 1.626 m (5' 4\").    Weight as of this encounter: 77.1 kg (170 lb). Body surface area is 1.87 meters squared.  Moderate Pain (5) Comment: Data Unavailable   No LMP recorded. Patient has had an injection.  Allergies reviewed: Yes  Medications reviewed: Yes    Medications: Medication refills not needed today.  Pharmacy name entered into EPIC:    [facility] - [place], [place] - [address]  [id number] [address] [facility] [place] [place] [phone]  [id number] [address] AT [id number] [address] [facility] [address] OF [address] [facility] - [place], [place] - [address]    Clinical concerns: No new concerns       Esteban Kelly MA            "

## 2021-07-28 NOTE — PATIENT INSTRUCTIONS
1. Dr. Pierce will evaluate for other possible orthotic options for your right arm and leg.  2. Dr. Pierce will assess risks/benefits for botox injections and get back to you regarding this.  3. Return to clinic with Dr. Pierce in 3-4 weeks.

## 2021-07-28 NOTE — NURSING NOTE
Chief Complaint   Patient presents with     Blood Draw     Labs drawn via PIV placed by Jing Olson RN in lab. VS taken.      Kaveh Ellsworth RN

## 2021-07-28 NOTE — LETTER
2021         RE: Jennifer Cervantes  [address]  [facility] [address]        Dear Colleague,    Thank you for referring your patient, Jennifer Cervantes, to the [facility]. Please see a copy of my visit note below.           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    Recent Medical History:  Jennifer was recently admitted from 21-21 for acute on chronic PE and pain. She is having breakthrough DVT/PE despite therapeutic DOAC levels on multiple drugs, so she was switched to dabigatran during her most recent admission.       Symptoms,  Patient was seen in clinic this afternoon for an initial evaluation.  Patient had her stroke when she was a baby which resulted in right hemiparesis. Patient has had physical and occupational therapy a few months ago, and has participated with therapies on and off over the years.  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   She is mainly here for ongoing difficulties with spasticity in her right upper and lower extremities. She has previously had botulinum toxin injections into the muscles of her right upper/lower extremities as a child at [facility] in [facility]. She feels that they helped at that time. 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   She underwent a right elbow flexor pronator plasty,  brachialis lengthening, biceps tenotomy and thenar release with Dr. Franco on 10/24/2018. She had primary recurrence of abnormal positioning/posture at her right wrist, her right elbow had remained stable at her last follow up with Dr. Franco in 10/2020.   She was eventually fitted for wrist/hand/elbow orthosis and feels that the plastic orthosis have not worked well at all. She has not noticed any improvement in positioning.  She states that she wears them regularly, but has recently stopped wearing them for periods of days at a time as she doesn't think they have helped. She continues with regular daily stretching exercises as well.  She also continues with a right foot drop, and has been fitted and refitted with various AFOs but has not found the right fit, so stopped wearing them. She does endorse that her right foot drags at times, but she denies any falls or near falls within her home or out in the community.  She comes in today requesting specific metal adjustable orthosis/splints for both her right arm and her right leg as she feels that these are the only things that may help. She is also interested in starting botox injections to some of her RUE/RLE muscles to see if this helps with the spasticity and relieves some discomfort.    Feels like her strength is much better in her right arm and right leg despite the spasticity/contractures than what it used to be. She can carry boxes. Her energy level is ok, and she takes naps when she is tired to help conserve energy.     Therapies/HEP,  Has participated in therapies as recommended by Orthopedic Surgery.      Functionally,   Patient is independent for mobility, ADLs and IADLs.           Past Medical and Surgical History:     Past Medical History:   Diagnosis Date     Anxiety      Bleeding disorder (H)      Blood clotting disorder (H)      Cerebral infarction (H) 2015     Cognitive developmental delay     low IQ. Please recognize when managing pain and  planning with her     Depressive disorder      Hemiplegia and hemiparesis following cerebral infarction affecting right dominant side (H)     right hand contractures     Iron overload due to repeated red blood cell transfusions      Migraines      Multiple subsegmental pulmonary emboli without acute cor pulmonale (H) 02/01/2021     Oppositional defiant behavior      Superficial venous thrombosis of arm, right 03/25/2021     Uncomplicated asthma      Past Surgical History:   Procedure Laterality Date     AS INSERT TUNNELED CV 2 CATH W/O PORT/PUMP       C BREAST REDUCTION (INCLUDES LIPO) TIER 3 Bilateral 04/23/2019     CHOLECYSTECTOMY       INSERT PORT VASCULAR ACCESS Left 4/21/2021    Procedure: INSERTION, VASCULAR ACCESS PORT (NOT SURE ON SIDE UNTIL REMOVAL);  Surgeon: Rajan More MD;  Location: [facility] OR     IR CHEST PORT PLACEMENT > 5 YRS OF AGE  4/21/2021     IR CVC NON TUNNEL LINE REMOVAL  5/6/2021     IR CVC NON TUNNEL PLACEMENT  04/07/2020     IR CVC NON TUNNEL PLACEMENT  4/30/2021     IR PORT REMOVAL LEFT  4/21/2021     REMOVE PORT VASCULAR ACCESS Left 4/21/2021    Procedure: REMOVAL, VASCULAR ACCESS PORT LEFT;  Surgeon: Rajan More MD;  Location: [facility] OR     REPAIR TENDON ELBOW Right 10/02/2019    Procedure: Right Elbow Flexor Lengthening, Flexor Pronator Slide Of Wrist and Finger, Thumb Adductor Lengthening;  Surgeon: Anai Franco MD;  Location: [facility] OR     TONSILLECTOMY Bilateral 10/02/2019    Procedure: Bilateral Tonsillectomy;  Surgeon: Farhana Guy MD;  Location: [facility] OR            Social History:     Social History     Tobacco Use     Smoking status: Never Smoker     Smokeless tobacco: Never Used   Substance Use Topics     Alcohol use: Not Currently     Alcohol/week: 0.0 standard drinks         Living situation: Lives in [place] in single family home  Family support: Mom, 3 siblings and nephew.  Vocational History: sickle cell got worse when she graduated from high school in  2017, doesn't currently have a job. Was previously working at [employer] for 6-7 months but then had to quit due to her illness. Is currently on disability. Wants to go to college.           Functional history:     Jennifer Cervantes is independent with all aspects of her life.    ADLs: the only ADL she needs help with is her hair.  Assistive devices: none  iADLs (medication management and finances): Independent with assist from family  Hand dominance: used to be right handed, now left handed due to right hand contracture/spasticity.   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            Family History:     Family History   Problem Relation Age of Onset     Sickle Cell Trait Mother      Hypertension Mother      Asthma Mother      Sickle Cell Trait Father             Medications:     Current Outpatient Medications   Medication Sig Dispense Refill     acetaminophen (TYLENOL) 325 MG tablet Take 2 tablets (650 mg) by mouth every 6 hours as needed for mild pain 120 tablet 3     albuterol (PROAIR HFA/PROVENTIL HFA/VENTOLIN HFA) 108 (90 Base) MCG/ACT inhaler Inhale 2 puffs into the lungs every 6 hours as needed for shortness of breath / dyspnea or wheezing 8.5 g 3     albuterol (PROVENTIL) (2.5 MG/3ML) 0.083% neb solution Take 1 vial (2.5 mg) by nebulization every 6 hours as needed for shortness of breath / dyspnea or wheezing 12 mL 4     ARIPiprazole (ABILIFY) 2 MG tablet Take 1 tablet (2 mg) by mouth daily 30 tablet 3     aspirin (ASA) 81 MG chewable tablet Take 1 tablet (81 mg) by mouth daily       budesonide-formoterol (SYMBICORT) 160-4.5 MCG/ACT Inhaler Inhale 1 puff into the lungs every evening 10.2 g 3     dabigatran ANTICOAGULANT (PRADAXA) 150 MG capsule Take 1 capsule (150 mg) by mouth 2 times daily Store in original 's bottle or blister pack; use within 120 days of opening. 60 capsule 0     diclofenac (VOLTAREN) 1 % topical gel Apply 4 g topically 4 times daily as needed for  moderate pain Apply to back, legs, and arms for pain (Patient not taking: Reported on 7/26/2021) 150 g 3     diphenhydrAMINE (BENADRYL) 25 MG capsule Take 1-2 capsules (25-50 mg) by mouth nightly as needed for sleep 60 capsule 3     EPINEPHrine (ANY BX GENERIC EQUIV) 0.3 MG/0.3ML injection 2-pack Inject 0.3 mLs (0.3 mg) into the muscle as needed for anaphylaxis 1 each 1     Hydroxyurea 1000 MG TABS Take 2,000 mg by mouth daily 60 tablet 3     hydrOXYzine (ATARAX) 25 MG tablet Take 1 tablet (25 mg) by mouth 3 times daily as needed for anxiety 30 tablet 1     JADENU 360 MG tablet Take 4 tablets (1,440 mg) by mouth every evening 120 tablet 4     levofloxacin (LEVAQUIN) 500 MG tablet Take 1 tablet (500 mg) by mouth daily for 4 days 4 tablet 0     lidocaine-prilocaine (EMLA) 2.5-2.5 % external cream Apply topically as needed for moderate pain 30 g 1     medroxyPROGESTERone (DEPO-PROVERA) 150 MG/ML IM injection Inject 150 mg into the muscle       naloxone (NARCAN) 4 MG/0.1ML nasal spray Spray 1 spray (4 mg) into one nostril alternating nostrils once as needed for opioid reversal every 2-3 minutes until assistance arrives 0.2 mL 1     ondansetron (ZOFRAN) 8 MG tablet Take 8 mg by mouth every 8 hours as needed        oxyCODONE (OXYCONTIN) 10 MG 12 hr tablet Take 1 tablet (10 mg) by mouth every 12 hours 60 tablet 0     oxyCODONE IR (ROXICODONE) 15 MG tablet Take 1 tablet (15mg) by mouth every 4-6 hours as needed for severe pain. Goal 4 per day. Max 6 per day. 60 tablet 0     sertraline (ZOLOFT) 100 MG tablet Take 2 tablets (200 mg) by mouth daily 180 tablet 3            Allergies:     Allergies   Allergen Reactions     Contrast Dye      Hives and breathing issues     Fish-Derived Products Hives     Seafood Hives     Diagnostic X-Ray Materials      Gadolinium               ROS:     A focused ROS is negative other than the symptoms noted above in the HPI.           Physical Examiniation:     VITAL SIGNS: There were no vitals  "taken for this visit.  BMI: Estimated body mass index is 28.84 kg/m  as calculated from the following:    Height as of 7/12/21: 1.626 m (5' 4\").    Weight as of an earlier encounter on 7/28/21: 76.2 kg (168 lb).    Gen: NAD, pleasant and cooperative  HEENT: Normocephalic, atraumatic, extra-ocular movements appear intact  Pulm: non-labored breathing in room air  Ext: no edema in BLE, no tenderness in calves  Neuro/MSK:   Orientation: Oriented to person, place, time, situation. Exhibits good insight into his/her condition and ongoing management/symptoms.  Motor: 4+/5 with right shoulder abduction, 4/5 with right elbow flexion, unable to assess wrist flexion due to contracture. 4/5 with  strength on right. 5/5 left shoulder abduction, elbow extension, wrist extension and  strength/finger intrinsics. 4/5 with right hip flexion, 4/5 with right knee extension, 3/5 with right ankle dorsiflexion, 4+/5 with right EHL, plantar flexion. 5/5 with all muscle groups in left lower extremity.  ROM is 120 degrees with right shoulder abduction, about 130 degrees with right elbow extension.   Tone with MAS- 2/4 with right shoulder abduction, 3/4 with right finger intrinsics, 2/4 with right knee flexion.  Sensory: intact to light touch and pinprick throughout all dermatomes in bilateral lower extremities.  Reflexes: intact, 2+ and symmetric in bilateral upper and lower extremities with biceps, triceps, BR, patellar and achilles.             Laboratory/Imaging:     NM Lung Scan Perfusion Particulate (7/13/21):  Impression:   1. At least one new segmental perfusion defect in the left upper lobe  apicoposterior segment on a background of known subsegmental and  segmental perfusion defects, suggestive of acute on chronic pulmonary  emboli.    2. Clear lungs on SPECT-CT.    CT Abdomen/Pelvis (7/10/21):  IMPRESSION:   1.  No definite acute abnormalities or CT findings to explain abdominal pain.  2.  Normal appendix. No acute bowel " findings.  3.  No urinary calculi or hydronephrosis.  4.  Cholecystectomy. No biliary dilatation.           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 As discussed with Jennifer, I will touch base with Orthotics to evaluate for potential options for orthosis for both her right upper and lower extremities. We also discussed trying  Botulinum toxin for some muscles in her right upper extremity, but more so in her right lower extremity to help with spasticity. It was explained that botox will not help in areas of contracture in her right upper extremity. She is understanding of this and would like to try both botulinum toxin and obtain new orthosis if possible.    1. Patient education: In depth discussion and education was provided about the assessment and implications of each of the below recommendations for management. Patient indicated readiness to learn, all questions were answered and understanding of material presented was confirmed.  2. Therapy/equipment/braces:  1. Will discuss with Orthotics regarding splint options for RUE/RLE.  3. Interventions:  1. Patient is interested in botulinum toxin injections to see if this can help improve her range in RUE/RLE. I discussed that in areas of previous surgical intervention and contractures, we will not improve range, but that it could benefit her in other muscles that are tight to improve ROM. Will discuss this further PM&R colleagues to evaluate risks/benefits of trying botulinum toxin in the setting of her medical co-morbidities and will confirm this with the patient by phone.  4. Follow up: 3-4 weeks.    Katherine Pierce MD  Physical Medicine &  Rehabilitation    I appreciate the opportunity to participate in the care of your patient.                     Again, thank you for allowing me to participate in the care of your patient.        Sincerely,        Katherine Pierce MD

## 2021-07-28 NOTE — TELEPHONE ENCOUNTER
Per Dr. Duncan: recommend pt have MR of abd first, hold off on teaching pt how to de-access, may move up MR or hold Desferal if needed.    LM for [facility], to call back with any questions.

## 2021-07-28 NOTE — PROGRESS NOTES
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    Recent Medical History:  Jennifer was recently admitted from 21-21 for acute on chronic PE and pain. She is having breakthrough DVT/PE despite therapeutic DOAC levels on multiple drugs, so she was switched to dabigatran during her most recent admission.       Symptoms,  Patient was seen in clinic this afternoon for an initial evaluation.  Patient had her stroke when she was a baby which resulted in right hemiparesis. Patient has had physical and occupational therapy a few months ago, and has participated with therapies on and off over the years.  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   She is mainly here for ongoing difficulties with spasticity in her right upper and lower extremities. She has previously had botulinum toxin injections into the muscles of her right upper/lower extremities as a child at [facility] in [place]. She feels that they helped at that time. 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   She underwent a right elbow flexor pronator plasty, brachialis lengthening, biceps tenotomy and thenar release with Dr. Franco on 10/24/2018. She had primary recurrence of abnormal positioning/posture at her right wrist, her right elbow had remained stable at her last follow up with Dr. Franco in 10/2020.    She was eventually fitted for wrist/hand/elbow orthosis and feels that the plastic orthosis have not worked well at all. She has not noticed any improvement in positioning.  She states that she wears them regularly, but has recently stopped wearing them for periods of days at a time as she doesn't think they have helped. She continues with regular daily stretching exercises as well.  She also continues with a right foot drop, and has been fitted and refitted with various AFOs but has not found the right fit, so stopped wearing them. She does endorse that her right foot drags at times, but she denies any falls or near falls within her home or out in the community.  She comes in today requesting specific metal adjustable orthosis/splints for both her right arm and her right leg as she feels that these are the only things that may help. She is also interested in starting botox injections to some of her RUE/RLE muscles to see if this helps with the spasticity and relieves some discomfort.    Feels like her strength is much better in her right arm and right leg despite the spasticity/contractures than what it used to be. She can carry boxes. Her energy level is ok, and she takes naps when she is tired to help conserve energy.     Therapies/HEP,  Has participated in therapies as recommended by Orthopedic Surgery.      Functionally,   Patient is independent for mobility, ADLs and IADLs.           Past Medical and Surgical History:     Past Medical History:   Diagnosis Date     Anxiety      Bleeding disorder (H)      Blood clotting disorder (H)      Cerebral infarction (H) 2015     Cognitive developmental delay     low IQ. 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 (H)     right hand contractures     Iron overload due to repeated red blood cell transfusions      Migraines      Multiple  subsegmental pulmonary emboli without acute cor pulmonale (H) 02/01/2021     Oppositional defiant behavior      Superficial venous thrombosis of arm, right 03/25/2021     Uncomplicated asthma      Past Surgical History:   Procedure Laterality Date     AS INSERT TUNNELED CV 2 CATH W/O PORT/PUMP       C BREAST REDUCTION (INCLUDES LIPO) TIER 3 Bilateral 04/23/2019     CHOLECYSTECTOMY       INSERT PORT VASCULAR ACCESS Left 4/21/2021    Procedure: INSERTION, VASCULAR ACCESS PORT (NOT SURE ON SIDE UNTIL REMOVAL);  Surgeon: Rajan More MD;  Location: [facility] OR     IR CHEST PORT PLACEMENT > 5 YRS OF AGE  4/21/2021     IR CVC NON TUNNEL LINE REMOVAL  5/6/2021     IR CVC NON TUNNEL PLACEMENT  04/07/2020     IR CVC NON TUNNEL PLACEMENT  4/30/2021     IR PORT REMOVAL LEFT  4/21/2021     REMOVE PORT VASCULAR ACCESS Left 4/21/2021    Procedure: REMOVAL, VASCULAR ACCESS PORT LEFT;  Surgeon: Rajan More MD;  Location: [facility] OR     REPAIR TENDON ELBOW Right 10/02/2019    Procedure: Right Elbow Flexor Lengthening, Flexor Pronator Slide Of Wrist and Finger, Thumb Adductor Lengthening;  Surgeon: Anai Franco MD;  Location: [facility] OR     TONSILLECTOMY Bilateral 10/02/2019    Procedure: Bilateral Tonsillectomy;  Surgeon: Farhana Guy MD;  Location: [facility] OR            Social History:     Social History     Tobacco Use     Smoking status: Never Smoker     Smokeless tobacco: Never Used   Substance Use Topics     Alcohol use: Not Currently     Alcohol/week: 0.0 standard drinks         Living situation: Lives in [place] in single family home  Family support: Mom, 3 siblings and nephew.  Vocational History: sickle cell got worse when she graduated from high school in 2017, doesn't currently have a job. Was previously working at [employer] for 6-7 months but then had to quit due to her illness. Is currently on disability. Wants to go to college.           Functional history:     Jennifer Cervantes is independent with all  aspects of her life.    ADLs: the only ADL she needs help with is her hair.  Assistive devices: none  iADLs (medication management and finances): Independent with assist from family  Hand dominance: used to be right handed, now left handed due to right hand contracture/spasticity.   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            Family History:     Family History   Problem Relation Age of Onset     Sickle Cell Trait Mother      Hypertension Mother      Asthma Mother      Sickle Cell Trait Father             Medications:     Current Outpatient Medications   Medication Sig Dispense Refill     acetaminophen (TYLENOL) 325 MG tablet Take 2 tablets (650 mg) by mouth every 6 hours as needed for mild pain 120 tablet 3     albuterol (PROAIR HFA/PROVENTIL HFA/VENTOLIN HFA) 108 (90 Base) MCG/ACT inhaler Inhale 2 puffs into the lungs every 6 hours as needed for shortness of breath / dyspnea or wheezing 8.5 g 3     albuterol (PROVENTIL) (2.5 MG/3ML) 0.083% neb solution Take 1 vial (2.5 mg) by nebulization every 6 hours as needed for shortness of breath / dyspnea or wheezing 12 mL 4     ARIPiprazole (ABILIFY) 2 MG tablet Take 1 tablet (2 mg) by mouth daily 30 tablet 3     aspirin (ASA) 81 MG chewable tablet Take 1 tablet (81 mg) by mouth daily       budesonide-formoterol (SYMBICORT) 160-4.5 MCG/ACT Inhaler Inhale 1 puff into the lungs every evening 10.2 g 3     dabigatran ANTICOAGULANT (PRADAXA) 150 MG capsule Take 1 capsule (150 mg) by mouth 2 times daily Store in original 's bottle or blister pack; use within 120 days of opening. 60 capsule 0     diclofenac (VOLTAREN) 1 % topical gel Apply 4 g topically 4 times daily as needed for moderate pain Apply to back, legs, and arms for pain (Patient not taking: Reported on 7/26/2021) 150 g 3     diphenhydrAMINE (BENADRYL) 25 MG capsule Take 1-2 capsules (25-50 mg) by mouth nightly as needed for sleep 60 capsule 3     EPINEPHrine (ANY BX  "GENERIC EQUIV) 0.3 MG/0.3ML injection 2-pack Inject 0.3 mLs (0.3 mg) into the muscle as needed for anaphylaxis 1 each 1     Hydroxyurea 1000 MG TABS Take 2,000 mg by mouth daily 60 tablet 3     hydrOXYzine (ATARAX) 25 MG tablet Take 1 tablet (25 mg) by mouth 3 times daily as needed for anxiety 30 tablet 1     JADENU 360 MG tablet Take 4 tablets (1,440 mg) by mouth every evening 120 tablet 4     levofloxacin (LEVAQUIN) 500 MG tablet Take 1 tablet (500 mg) by mouth daily for 4 days 4 tablet 0     lidocaine-prilocaine (EMLA) 2.5-2.5 % external cream Apply topically as needed for moderate pain 30 g 1     medroxyPROGESTERone (DEPO-PROVERA) 150 MG/ML IM injection Inject 150 mg into the muscle       naloxone (NARCAN) 4 MG/0.1ML nasal spray Spray 1 spray (4 mg) into one nostril alternating nostrils once as needed for opioid reversal every 2-3 minutes until assistance arrives 0.2 mL 1     ondansetron (ZOFRAN) 8 MG tablet Take 8 mg by mouth every 8 hours as needed        oxyCODONE (OXYCONTIN) 10 MG 12 hr tablet Take 1 tablet (10 mg) by mouth every 12 hours 60 tablet 0     oxyCODONE IR (ROXICODONE) 15 MG tablet Take 1 tablet (15mg) by mouth every 4-6 hours as needed for severe pain. Goal 4 per day. Max 6 per day. 60 tablet 0     sertraline (ZOLOFT) 100 MG tablet Take 2 tablets (200 mg) by mouth daily 180 tablet 3            Allergies:     Allergies   Allergen Reactions     Contrast Dye      Hives and breathing issues     Fish-Derived Products Hives     Seafood Hives     Diagnostic X-Ray Materials      Gadolinium               ROS:     A focused ROS is negative other than the symptoms noted above in the HPI.           Physical Examiniation:     VITAL SIGNS: There were no vitals taken for this visit.  BMI: Estimated body mass index is 28.84 kg/m  as calculated from the following:    Height as of 7/12/21: 1.626 m (5' 4\").    Weight as of an earlier encounter on 7/28/21: 76.2 kg (168 lb).    Gen: NAD, pleasant and " cooperative  HEENT: Normocephalic, atraumatic, extra-ocular movements appear intact  Pulm: non-labored breathing in room air  Ext: no edema in BLE, no tenderness in calves  Neuro/MSK:   Orientation: Oriented to person, place, time, situation. Exhibits good insight into his/her condition and ongoing management/symptoms.  Motor: 4+/5 with right shoulder abduction, 4/5 with right elbow flexion, unable to assess wrist flexion due to contracture. 4/5 with  strength on right. 5/5 left shoulder abduction, elbow extension, wrist extension and  strength/finger intrinsics. 4/5 with right hip flexion, 4/5 with right knee extension, 3/5 with right ankle dorsiflexion, 4+/5 with right EHL, plantar flexion. 5/5 with all muscle groups in left lower extremity.  ROM is 120 degrees with right shoulder abduction, about 130 degrees with right elbow extension.   Tone with MAS- 2/4 with right shoulder abduction, 3/4 with right finger intrinsics, 2/4 with right knee flexion.  Sensory: intact to light touch and pinprick throughout all dermatomes in bilateral lower extremities.  Reflexes: intact, 2+ and symmetric in bilateral upper and lower extremities with biceps, triceps, BR, patellar and achilles.             Laboratory/Imaging:     NM Lung Scan Perfusion Particulate (7/13/21):  Impression:   1. At least one new segmental perfusion defect in the left upper lobe  apicoposterior segment on a background of known subsegmental and  segmental perfusion defects, suggestive of acute on chronic pulmonary  emboli.    2. Clear lungs on SPECT-CT.    CT Abdomen/Pelvis (7/10/21):  IMPRESSION:   1.  No definite acute abnormalities or CT findings to explain abdominal pain.  2.  Normal appendix. No acute bowel findings.  3.  No urinary calculi or hydronephrosis.  4.  Cholecystectomy. No biliary dilatation.           Assessment/Plan:   Jennifer Cervantes is a 21 year old female with history of HbSS (complicated by stroke leading to right hemiparesis  and cognitive delay, frequent pain crises with acute/chronic components), anxiety, depression and iron overload (due to chronic transfusions)  who [facility] for evaluation of   stroke sequelae and weakness. As discussed with Jennifer, I will touch base with Orthotics to evaluate for potential options for orthosis for both her right upper and lower extremities. We also discussed trying  Botulinum toxin for some muscles in her right upper extremity, but more so in her right lower extremity to help with spasticity. It was explained that botox will not help in areas of contracture in her right upper extremity. She is understanding of this and would like to try both botulinum toxin and obtain new orthosis if possible.    1. Patient education: In depth discussion and education was provided about the assessment and implications of each of the below recommendations for management. Patient indicated readiness to learn, all questions were answered and understanding of material presented was confirmed.  2. Therapy/equipment/braces:  1. Will discuss with Orthotics regarding splint options for RUE/RLE.  3. Interventions:  1. Patient is interested in botulinum toxin injections to see if this can help improve her range in RUE/RLE. I discussed that in areas of previous surgical intervention and contractures, we will not improve range, but that it could benefit her in other muscles that are tight to improve ROM. Will discuss this further PM&R colleagues to evaluate risks/benefits of trying botulinum toxin in the setting of her medical co-morbidities and will confirm this with the patient by phone.  4. Follow up: 3-4 weeks.    Katherine Pierce MD  Physical Medicine & Rehabilitation    I appreciate the opportunity to participate in the care of your patient.

## 2021-07-28 NOTE — PATIENT INSTRUCTIONS
Contact Numbers  [facility]: [phone] (for symptom and scheduling needs)    Please call the [facility] Triage line if you experience a temperature greater than or equal to 100.4, shaking chills, have uncontrolled nausea, vomiting and/or diarrhea, dizziness, shortness of breath, chest pain, bleeding, unexplained bruising, or if you have any other new/concerning symptoms, questions or concerns.     If you are having any concerning symptoms or wish to speak to a provider before your next infusion visit, please call your care coordinator or triage to notify them so we can adequately serve you.     If you need a refill on a narcotic prescription or other medication, please call triage before your infusion appointment.          July 2021 Sunday Monday Tuesday Wednesday Thursday Friday Saturday                       1     2    LAB CENTRAL   7:30 AM   (15 min.)   [facility]   [facility]    ONC INFUSION 3 HR (180 MIN)   8:00 AM   (180 min.)    ONC INFUSION NURSE   [facility] 3    Admission   1:49 AM   Court Ring MD   [facility]   (Discharge: 7/3/2021)   4    Admission   4:21 AM   Court Ring MD   [facility]   (Discharge: 7/4/2021) 5    Admission   2:44 AM   Andrew Chou MD   [facility]   (Discharge: 7/5/2021) 6    ONC INFUSION 2 HR (120 MIN)  11:30 AM   (120 min.)    ONC INFUSION NURSE   [facility] 7     8    Admission   3:07 AM   Jamee Martin MD   [facility]   (Discharge: 7/8/2021)    US ABDOMEN COMPLETE   5:45 AM   (40 min.)   [email or web address]   [facility] 9    NEW   2:45 PM   (60 min.)   Katherine Pierce MD   [facility] 10    Admission   3:29 AM   Francesco Green MD   [facility]   (Discharge: 7/10/2021)    CT ABDOMEN PELVIS WO   6:35 AM   (20 min.)   [id number]   [facility]   11     12    Admission   2:08 AM   [facility]   (Discharge: 7/12/2021)    XR CHEST 2 VIEWS   4:15 AM   (20 min.)   [email or web address]   [facility]    VIDEO VISIT RETURN  12:45 PM   (30 min.)   Eric Duncan MD   [facility] 13    SPEC INFUSION 3 HR (180 MIN)  10:00 AM   (180 min.)   [facility] INFUSION NURSE   [facility]    RETURN  11:00 AM   (45 min.)   Andrei Machado PA   [facility]    XR CHEST 2 VIEWS   3:15 PM   (20 min.)   [email or web address]   [facility] [place]    Admission   4:28 PM   Alhaji Winters MD   [facility] [address] [place] [address]   (Discharge: 7/25/2021)    XR CHEST PORT 1 VIEW   4:35 PM   (20 min.)   [id number]   [facility]    NM LUNG SCAN VENT/PERF  10:30 PM   (60 min.)   [email or web address]   [facility] 14    XR CHEST PORT 1 VIEW   4:55 AM   (20 min.)   [id number]   [facility] 15     16     17       18     19     20    XR CHEST PORT 1 VIEW   1:15 PM   (20 min.)   [id number]   [facility] 21    XR CHEST PORT 1 VIEW   2:30 PM   (20 min.)   [id number]   [facility] 22     23    NEW   7:45 AM   (60 min.)   Katherine Pierce MD   [facility] 24       25     26    LAB CENTRAL  11:45 AM   (15 min.)    [facility]   [facility]    RETURN  12:15 PM   (30 min.)   Eric Duncan MD   [facility]    ONC INFUSION 2 HR (120 MIN)   2:30 PM   (120 min.)   [facility] ONC INFUSION NURSE   [facility] 27    ONC INFUSION 2 HR (120 MIN)   2:30 PM   (120 min.)    ONC INFUSION NURSE   [facility]  28    LAB CENTRAL  11:30 AM   (15 min.)    [facility]   [facility]    ONC INFUSION 2 HR (120 MIN)  12:00 PM   (120 min.)   [facility] ONC INFUSION NURSE   [facility]    NEW   3:45 PM   (60 min.)   Katherine Pierce MD   [facility] 29    ONC INFUSION 2 HR (120 MIN)   2:30 PM   (120 min.)   [facility] ONC INFUSION NURSE   [facility] 30    ONC INFUSION 3 HR (180 MIN)   2:30 PM   (180 min.)    ONC INFUSION NURSE   [facility] 31 August 2021 Sunday Monday Tuesday Wednesday Thursday Friday Saturday   1     2     3     4     5     6     7       8     9     10     11     12     13     LWR EXT VENOUS DUPLEX LEFT   8:30 AM   (30 min.)   [email or web address]   [facility] 14       15     16    HAND INITIAL   8:45 AM   (60 min.)   Franchesca Tucker OT   [facility] [place]    VIDEO VISIT RETURN  10:15 AM   (30 min.)   Suraj Case MD   [facility] [place]    LAB CENTRAL  11:15 AM   (15 min.)   [facility]   [facility]    RETURN  11:45 AM   (30 min.)   Eric Duncan MD   [facility] 17     18    MR ABDOMEN WO   2:00 PM   (45 min.)   [email or web address]   [facility] 19     20     21       22     23     24     25    LAB CENTRAL   6:30 AM   (15 min.)    [facility]   [facility]    ONC INFUSION 3 HR (180 MIN)   7:00 AM   (180 min.)    ONC INFUSION NURSE   [facility] NURSE VISIT  10:00 AM   (30 min.)   Nurse, [facility] [place] 26     27     28       29     30     31                                         Lab Results:  Recent Results (from the past 12 hour(s))   Comprehensive  metabolic panel    Collection Time: 07/28/21 11:56 AM   Result Value Ref Range    Sodium 142 133 - 144 mmol/L    Potassium 3.8 3.4 - 5.3 mmol/L    Chloride 116 (H) 94 - 109 mmol/L    Carbon Dioxide (CO2) 20 20 - 32 mmol/L    Anion Gap 6 3 - 14 mmol/L    Urea Nitrogen 7 7 - 30 mg/dL    Creatinine 0.58 0.52 - 1.04 mg/dL    Calcium 8.6 8.5 - 10.1 mg/dL    Glucose 91 70 - 99 mg/dL    Alkaline Phosphatase 62 40 - 150 U/L    AST 86 (H) 0 - 45 U/L    ALT 70 (H) 0 - 50 U/L    Protein Total 7.6 6.8 - 8.8 g/dL    Albumin 3.8 3.4 - 5.0 g/dL    Bilirubin Total 2.5 (H) 0.2 - 1.3 mg/dL    GFR Estimate >90 >60 mL/min/1.73m2   CBC with platelets and differential    Collection Time: 07/28/21 11:57 AM   Result Value Ref Range    WBC Count 5.9 4.0 - 11.0 10e3/uL    RBC Count 2.37 (L) 3.80 - 5.20 10e6/uL    Hemoglobin 8.1 (L) 11.7 - 15.7 g/dL    Hematocrit 23.5 (L) 35.0 - 47.0 %    MCV 99 78 - 100 fL    MCH 34.2 (H) 26.5 - 33.0 pg    MCHC 34.5 31.5 - 36.5 g/dL    RDW 25.2 (H) 10.0 - 15.0 %    Platelet Count 329 150 - 450 10e3/uL   Reticulocyte count    Collection Time: 07/28/21 11:57 AM   Result Value Ref Range    % Reticulocyte 19.8 (H) 0.5 - 2.0 %    Absolute Reticulocyte 0.459 (H) 0.025 - 0.095 10e6/uL   Manual Differential    Collection Time: 07/28/21 11:57 AM   Result Value Ref Range    % Neutrophils 72 %    % Lymphocytes 21 %    % Monocytes 2 %    % Eosinophils 3 %    % Basophils 2 %    NRBCs per 100 WBC 57 (H) <=0 %    Absolute Neutrophils 4.2 1.6 - 8.3 10e3/uL    Absolute Lymphocytes 1.2 0.8 - 5.3 10e3/uL    Absolute Monocytes 0.1 0.0 - 1.3 10e3/uL    Absolute Eosinophils 0.2 0.0 - 0.7 10e3/uL    Absolute Basophils 0.1 0.0 - 0.2 10e3/uL    Absolute NRBCs 3.4 (H) <=0.0 10e3/uL    RBC Morphology Confirmed RBC Indices     Platelet Assessment  Automated Count Confirmed. Platelet morphology is normal.     Automated Count Confirmed. Platelet morphology is normal.    Rice Cells Slight (A) None Seen    RBC Fragments Slight (A) None  Seen    Polychromasia Slight (A) None Seen    Sickle Cells Marked (A) None Seen    Target Cells Slight (A) None Seen

## 2021-07-29 ENCOUNTER — INFUSION THERAPY VISIT (OUTPATIENT)
Dept: ONCOLOGY | Facility: CLINIC | Age: 21
End: 2021-07-29
Attending: PEDIATRICS
Payer: COMMERCIAL

## 2021-07-29 ENCOUNTER — HOME INFUSION (PRE-WILLOW HOME INFUSION) (OUTPATIENT)
Dept: PHARMACY | Facility: CLINIC | Age: 21
End: 2021-07-29

## 2021-07-29 VITALS
SYSTOLIC BLOOD PRESSURE: 129 MMHG | DIASTOLIC BLOOD PRESSURE: 82 MMHG | TEMPERATURE: 98.8 F | RESPIRATION RATE: 18 BRPM | HEART RATE: 108 BPM

## 2021-07-29 DIAGNOSIS — D57.00 SICKLE CELL PAIN CRISIS (H): Primary | ICD-10-CM

## 2021-07-29 PROCEDURE — 96361 HYDRATE IV INFUSION ADD-ON: CPT

## 2021-07-29 PROCEDURE — 250N000011 HC RX IP 250 OP 636: Performed by: PEDIATRICS

## 2021-07-29 PROCEDURE — 999N000127 HC STATISTIC PERIPHERAL IV START W US GUIDANCE

## 2021-07-29 PROCEDURE — 99207 PR NO BILLABLE SERVICE THIS VISIT: CPT

## 2021-07-29 PROCEDURE — 96374 THER/PROPH/DIAG INJ IV PUSH: CPT

## 2021-07-29 PROCEDURE — 258N000003 HC RX IP 258 OP 636: Performed by: PEDIATRICS

## 2021-07-29 PROCEDURE — 96376 TX/PRO/DX INJ SAME DRUG ADON: CPT

## 2021-07-29 RX ORDER — ONDANSETRON 8 MG/1
8 TABLET, FILM COATED ORAL
Status: CANCELLED
Start: 2021-07-29

## 2021-07-29 RX ORDER — DIPHENHYDRAMINE HCL 25 MG
25 CAPSULE ORAL
Status: CANCELLED
Start: 2021-07-29

## 2021-07-29 RX ORDER — MORPHINE SULFATE 2 MG/ML
2 INJECTION, SOLUTION INTRAMUSCULAR; INTRAVENOUS
Status: DISCONTINUED | OUTPATIENT
Start: 2021-07-29 | End: 2021-07-29 | Stop reason: HOSPADM

## 2021-07-29 RX ORDER — HEPARIN SODIUM (PORCINE) LOCK FLUSH IV SOLN 100 UNIT/ML 100 UNIT/ML
5 SOLUTION INTRAVENOUS
Status: CANCELLED | OUTPATIENT
Start: 2021-07-29

## 2021-07-29 RX ORDER — MORPHINE SULFATE 2 MG/ML
2 INJECTION, SOLUTION INTRAMUSCULAR; INTRAVENOUS
Status: CANCELLED
Start: 2021-07-29

## 2021-07-29 RX ORDER — HEPARIN SODIUM,PORCINE 10 UNIT/ML
5 VIAL (ML) INTRAVENOUS
Status: CANCELLED | OUTPATIENT
Start: 2021-07-29

## 2021-07-29 RX ADMIN — DEXTROSE AND SODIUM CHLORIDE 500 ML: 5; 450 INJECTION, SOLUTION INTRAVENOUS at 14:53

## 2021-07-29 RX ADMIN — MORPHINE SULFATE 2 MG: 2 INJECTION, SOLUTION INTRAMUSCULAR; INTRAVENOUS at 14:53

## 2021-07-29 RX ADMIN — MORPHINE SULFATE 2 MG: 2 INJECTION, SOLUTION INTRAMUSCULAR; INTRAVENOUS at 16:03

## 2021-07-29 RX ADMIN — MORPHINE SULFATE 2 MG: 2 INJECTION, SOLUTION INTRAMUSCULAR; INTRAVENOUS at 17:03

## 2021-07-29 NOTE — PATIENT INSTRUCTIONS
Contact Numbers  [facility]: [phone]    After Hours:  [phone]  Triage: [phone]    Please call the [facility] Triage line if you experience a temperature greater than or equal to 100.5, shaking chills, have uncontrolled nausea, vomiting and/or diarrhea, dizziness, shortness of breath, chest pain, bleeding, unexplained bruising, or if you have any other new/concerning symptoms, questions or concerns.     If it is after hours, weekends, or holidays, please call the main hospital  at  [phone] and ask to speak to the Oncology doctor on call.     If you are having any concerning symptoms or wish to speak to a provider before your next infusion visit, please call your care coordinator or triage to notify them so we can adequately serve you.     If you need a refill on a narcotic prescription or other medication, please call triage before your infusion appointment.         July 2021 Sunday Monday Tuesday Wednesday Thursday Friday Saturday                       1     2    LAB CENTRAL   7:30 AM   (15 min.)   [facility]   [facility]    ONC INFUSION 3 HR (180 MIN)   8:00 AM   (180 min.)    ONC INFUSION NURSE   [facility] 3    Admission   1:49 AM   Court Ring MD   [facility]   (Discharge: 7/3/2021)   4    Admission   4:21 AM   Court Ring MD   [facility]   (Discharge: 7/4/2021) 5    Admission   2:44 AM   Andrew Chou MD   [facility]   (Discharge: 7/5/2021) 6    ONC INFUSION 2 HR (120 MIN)  11:30 AM   (120 min.)    ONC INFUSION NURSE   [facility] 7     8    Admission   3:07 AM   Jamee Martin MD   [facility]   (Discharge: 7/8/2021)    US ABDOMEN COMPLETE   5:45 AM   (40 min.)   UUUS1   [facility] Imaging  9    NEW   2:45 PM   (60 min.)   Katherine Pierce MD   [facility] 10    Admission   3:29 AM   Francesco Green MD   [facility] Emergency Department   (Discharge: 7/10/2021)    CT ABDOMEN PELVIS WO   6:35 AM   (20 min.)   UUCT4   [facility] Imaging   11     12    Admission   2:08 AM   [facility] Emergency Department   (Discharge: 7/12/2021)    XR CHEST 2 VIEWS   4:15 AM   (20 min.)   UUXR3   [facility] Imaging    VIDEO VISIT RETURN  12:45 PM   (30 min.)   Eric Duncan MD   [facility] 13    SPEC INFUSION 3 HR (180 MIN)  10:00 AM   (180 min.)   [facility] SIPC INFUSION NURSE   [facility]    RETURN  11:00 AM   (45 min.)   Andrei Machado PA   [facility]    XR CHEST 2 VIEWS   3:15 PM   (20 min.)   [id number]   [facility] Center Xray [place]    Admission   4:28 PM   Alhaji Winters MD   [facility] [address] [place] [address]   (Discharge: 7/25/2021)    XR CHEST PORT 1 VIEW   4:35 PM   (20 min.)   [id number]   [facility] Imaging    NM LUNG SCAN VENT/PERF  10:30 PM   (60 min.)   UUNM2   [facility] 14    XR CHEST PORT 1 VIEW   4:55 AM   (20 min.)   UUXRPH1   [facility] Imaging 15     16     17       18     19     20    XR CHEST PORT 1 VIEW   1:15 PM   (20 min.)   UUXRPH1   [facility] 21    XR CHEST PORT 1 VIEW   2:30 PM   (20 min.)   UUXRPO1   [facility] Imaging 22     23    NEW   7:45 AM   (60 min.)   Katherine Pierce MD   [facility] 24       25     26    LAB CENTRAL  11:45 AM   (15 min.)   [facility]   [facility]    RETURN  12:15 PM   (30 min.)   Eric Duncan MD   [facility]    ONC INFUSION 2 HR (120 MIN)   2:30 PM   (120 min.)   UC ONC  INFUSION NURSE   [facility] 27    ONC INFUSION 2 HR (120 MIN)   2:30 PM   (120 min.)    ONC INFUSION NURSE   [facility] 28    LAB CENTRAL  11:30 AM   (15 min.)    [facility]   [facility]    ONC INFUSION 2 HR (120 MIN)  12:00 PM   (120 min.)   UC ONC INFUSION NURSE   [facility]    NEW   3:45 PM   (60 min.)   Katherine Pierce MD   [facility] 29    ONC INFUSION 2 HR (120 MIN)   2:30 PM   (120 min.)    ONC INFUSION NURSE   [facility] 30    ONC INFUSION 3 HR (180 MIN)   2:30 PM   (180 min.)    ONC INFUSION NURSE   [facility] 31 August 2021 Sunday Monday Tuesday Wednesday Thursday Friday Saturday   1     2     3     4     5     6     7       8     9     10     11     12     13     LWR EXT VENOUS DUPLEX LEFT   8:30 AM   (30 min.)   UCSCUSV1   [facility] 14       15     16    HAND INITIAL   8:45 AM   (60 min.)   Franchesca Tucker OT   [facility] Rehabilitation Services [place]    VIDEO VISIT RETURN  10:15 AM   (30 min.)   Suraj Case MD   [facility] Internal Medicine [place]    LAB CENTRAL  11:15 AM   (15 min.)   [facility]   [facility]    RETURN  11:45 AM   (30 min.)   Eric Duncan MD   [facility] 17     18    MR ABDOMEN WO   2:00 PM   (45 min.)   UUMR1   [facility] Imaging 19     20     21       22     23     24     25    LAB CENTRAL   6:30 AM   (15 min.)    [facility]   [facility]    ONC INFUSION 3 HR (180 MIN)   7:00 AM   (180 min.)    ONC INFUSION NURSE   [facility] NURSE VISIT  10:00 AM   (30 min.)   Nurse, Mildred Pcc   [facility]  Internal Medicine [place]    RETURN  12:45 PM   (60 min.)   Katherine Pierce MD   [facility] 26     27     28       29     30     31                                         Lab Results:  No results found for this or any previous visit (from the past 12 hour(s)).

## 2021-07-29 NOTE — PROGRESS NOTES
"Infusion Nursing Note:  Jennifer Cervantes presents today for IVF/pain medication.    Patient seen by provider today: No   present during visit today: Not Applicable.    Note: Patient endorses non radiating constant sharp pain on lower back with PS 9/10. Denies nausea/vomiting. Denies fever/chills. Patient states that this is her \"usual sickle cell pain\". Denies short of breath nor chest and abdominal discomfort. No new concern made. Otherwise well.     Upon discharge patient had 3 doses of Morphine with PS 6/10. Patient feels better and agreed to go home. Patient verbalized knowledge on where and when to call if symptoms persists/worsen    Intravenous Access:  Peripheral IV placed by Vascular Access.  Implanted Port had continuous home infusion of Desferal running.     Treatment Conditions:  Not Applicable.      Post Infusion Assessment:  Patient tolerated infusion without incident.  Blood return noted pre and post infusion.  Site patent and intact, free from redness, edema or discomfort.  No evidence of extravasations.  Access discontinued per protocol.       Discharge Plan:   Patient declined prescription refills.  Discharge instructions reviewed with: Patient.  Patient and/or family verbalized understanding of discharge instructions and all questions answered.  AVS to patient via [employer].  Patient will return 7/30/21 for next appointment.   Patient discharged in stable condition accompanied by: self.  Departure Mode: Ambulatory.      GLORIA YANCEY RN                      "

## 2021-07-29 NOTE — PROGRESS NOTES
This is a recent snapshot of the patient's [facility] medical record.  For current drug dose and complete information and questions, call [phone] or In Basket pool, fv home infusion ([id number])  CSN Number:  [id number]

## 2021-07-30 ENCOUNTER — INFUSION THERAPY VISIT (OUTPATIENT)
Dept: ONCOLOGY | Facility: CLINIC | Age: 21
End: 2021-07-30
Attending: PEDIATRICS
Payer: COMMERCIAL

## 2021-07-30 VITALS
RESPIRATION RATE: 18 BRPM | HEART RATE: 101 BPM | DIASTOLIC BLOOD PRESSURE: 75 MMHG | SYSTOLIC BLOOD PRESSURE: 107 MMHG | TEMPERATURE: 98.2 F | OXYGEN SATURATION: 98 %

## 2021-07-30 DIAGNOSIS — D57.00 SICKLE CELL PAIN CRISIS (H): Primary | ICD-10-CM

## 2021-07-30 PROCEDURE — 99207 PR NO BILLABLE SERVICE THIS VISIT: CPT

## 2021-07-30 PROCEDURE — 96374 THER/PROPH/DIAG INJ IV PUSH: CPT

## 2021-07-30 PROCEDURE — 250N000011 HC RX IP 250 OP 636: Performed by: PEDIATRICS

## 2021-07-30 PROCEDURE — 96376 TX/PRO/DX INJ SAME DRUG ADON: CPT

## 2021-07-30 PROCEDURE — 258N000003 HC RX IP 258 OP 636: Performed by: PEDIATRICS

## 2021-07-30 PROCEDURE — 96361 HYDRATE IV INFUSION ADD-ON: CPT

## 2021-07-30 RX ORDER — HEPARIN SODIUM (PORCINE) LOCK FLUSH IV SOLN 100 UNIT/ML 100 UNIT/ML
5 SOLUTION INTRAVENOUS
Status: CANCELLED | OUTPATIENT
Start: 2021-07-30

## 2021-07-30 RX ORDER — HEPARIN SODIUM (PORCINE) LOCK FLUSH IV SOLN 100 UNIT/ML 100 UNIT/ML
5 SOLUTION INTRAVENOUS
Status: DISCONTINUED | OUTPATIENT
Start: 2021-07-30 | End: 2021-07-30 | Stop reason: HOSPADM

## 2021-07-30 RX ORDER — DIPHENHYDRAMINE HCL 25 MG
25 CAPSULE ORAL
Status: CANCELLED
Start: 2021-07-30

## 2021-07-30 RX ORDER — HEPARIN SODIUM,PORCINE 10 UNIT/ML
5 VIAL (ML) INTRAVENOUS
Status: CANCELLED | OUTPATIENT
Start: 2021-07-30

## 2021-07-30 RX ORDER — ONDANSETRON 8 MG/1
8 TABLET, FILM COATED ORAL
Status: CANCELLED
Start: 2021-07-30

## 2021-07-30 RX ORDER — MORPHINE SULFATE 2 MG/ML
2 INJECTION, SOLUTION INTRAMUSCULAR; INTRAVENOUS
Status: CANCELLED
Start: 2021-07-30

## 2021-07-30 RX ORDER — MORPHINE SULFATE 2 MG/ML
2 INJECTION, SOLUTION INTRAMUSCULAR; INTRAVENOUS
Status: COMPLETED | OUTPATIENT
Start: 2021-07-30 | End: 2021-07-30

## 2021-07-30 RX ADMIN — Medication 5 ML: at 16:55

## 2021-07-30 RX ADMIN — DEXTROSE AND SODIUM CHLORIDE 500 ML: 5; 450 INJECTION, SOLUTION INTRAVENOUS at 14:40

## 2021-07-30 RX ADMIN — MORPHINE SULFATE 2 MG: 2 INJECTION, SOLUTION INTRAMUSCULAR; INTRAVENOUS at 16:42

## 2021-07-30 RX ADMIN — MORPHINE SULFATE 2 MG: 2 INJECTION, SOLUTION INTRAMUSCULAR; INTRAVENOUS at 14:41

## 2021-07-30 RX ADMIN — MORPHINE SULFATE 2 MG: 2 INJECTION, SOLUTION INTRAMUSCULAR; INTRAVENOUS at 15:41

## 2021-07-30 NOTE — PATIENT INSTRUCTIONS
Contact Numbers  [facility]: [phone]    After Hours:  [phone]  Triage: [phone]    Please call the [facility] Triage line if you experience a temperature greater than or equal to 100.5, shaking chills, have uncontrolled nausea, vomiting and/or diarrhea, dizziness, shortness of breath, chest pain, bleeding, unexplained bruising, or if you have any other new/concerning symptoms, questions or concerns.     If it is after hours, weekends, or holidays, please call the main hospital  at  [phone] and ask to speak to the Oncology doctor on call.     If you are having any concerning symptoms or wish to speak to a provider before your next infusion visit, please call your care coordinator or triage to notify them so we can adequately serve you.     If you need a refill on a narcotic prescription or other medication, please call triage before your infusion appointment.         July 2021 Sunday Monday Tuesday Wednesday Thursday Friday Saturday                       1     2    LAB CENTRAL   7:30 AM   (15 min.)   [facility]   [facility]    ONC INFUSION 3 HR (180 MIN)   8:00 AM   (180 min.)    ONC INFUSION NURSE   [facility] 3    Admission   1:49 AM   Court Ring MD   [facility] Emergency Department   (Discharge: 7/3/2021)   4    Admission   4:21 AM   Court Ring MD   [facility] Emergency Department   (Discharge: 7/4/2021) 5    Admission   2:44 AM   Andrew Chou MD   [facility] Emergency Department   (Discharge: 7/5/2021) 6    ONC INFUSION 2 HR (120 MIN)  11:30 AM   (120 min.)    ONC INFUSION NURSE   [facility] 7     8    Admission   3:07 AM   Jamee Martin MD   [facility] Emergency Department   (Discharge: 7/8/2021)    US ABDOMEN COMPLETE   5:45 AM   (40 min.)   UUUS1   [facility] Imaging  9    NEW   2:45 PM   (60 min.)   Katherine Pierce MD   [facility] 10    Admission   3:29 AM   Francesco Green MD   [facility] Emergency Department   (Discharge: 7/10/2021)    CT ABDOMEN PELVIS WO   6:35 AM   (20 min.)   UUCT4   [facility] Imaging   11     12    Admission   2:08 AM   [facility] Emergency [facility]   (Discharge: 7/12/2021)    XR CHEST 2 VIEWS   4:15 AM   (20 min.)   UUXR3   [facility] Imaging    VIDEO VISIT RETURN  12:45 PM   (30 min.)   Eric Duncan MD   [facility] 13    SPEC INFUSION 3 HR (180 MIN)  10:00 AM   (180 min.)   UC SIPC INFUSION NURSE   [facility] Advanced [facility]    RETURN  11:00 AM   (45 min.)   Andrei Machado PA   [facility]    XR CHEST 2 VIEWS   3:15 PM   (20 min.)   [id number]   [facility] Xray [place]    Admission   4:28 PM   Alhaji Winters MD   [facility] [address] [place] [address]   (Discharge: 7/25/2021)    XR CHEST PORT 1 VIEW   4:35 PM   (20 min.)   [id number]   [facility] Imaging    NM LUNG SCAN VENT/PERF  10:30 PM   (60 min.)   UUNM2   [facility] Imaging 14    XR CHEST PORT 1 VIEW   4:55 AM   (20 min.)   UUXRPH1   [facility] Imaging 15     16     17       18     19     20    XR CHEST PORT 1 VIEW   1:15 PM   (20 min.)   UUXRPH1   [facility] Imaging 21    XR CHEST PORT 1 VIEW   2:30 PM   (20 min.)   UUXRPO1   [facility] Imaging 22     23    NEW   7:45 AM   (60 min.)   Katherine Pierce MD   [facility] 24       25     26    LAB CENTRAL  11:45 AM   (15 min.)   [facility]   [facility]    RETURN  12:15 PM   (30 min.)   Eric Duncan MD   [facility]    ONC INFUSION 2 HR (120 MIN)   2:30 PM   (120 min.)   UC ONC  INFUSION NURSE   [facility] 27    ONC INFUSION 2 HR (120 MIN)   2:30 PM   (120 min.)   UC ONC INFUSION NURSE   [facility] 28    LAB CENTRAL  11:30 AM   (15 min.)   [facility]   [facility]    ONC INFUSION 2 HR (120 MIN)  12:00 PM   (120 min.)   UC ONC INFUSION NURSE   [facility]    NEW   3:45 PM   (60 min.)   Katherine Pierce MD   [facility] 29    ONC INFUSION 2 HR (120 MIN)   2:30 PM   (120 min.)   UC ONC INFUSION NURSE   [facility] 30    ONC INFUSION 3 HR (180 MIN)   2:30 PM   (180 min.)    ONC INFUSION NURSE   [facility] 31 August 2021 Sunday Monday Tuesday Wednesday Thursday Friday Saturday   1     2    ONC INFUSION 3 HR (180 MIN)   7:30 AM   (180 min.)    ONC INFUSION NURSE   [facility] 3     4     5     6     7       8     9     10     11     12     13    US LWR EXT VENOUS DUPLEX LEFT   8:30 AM   (30 min.)   UCSCUSV1   [facility] 14       15     16    HAND INITIAL   8:45 AM   (60 min.)   Franchesca Tucker OT   [facility] Rehabilitation Services [place]    VIDEO VISIT RETURN  10:15 AM   (30 min.)   Suraj Case MD   [facility] Internal Medicine [place]    LAB CENTRAL  11:15 AM   (15 min.)    [facility]   [facility]    RETURN  11:45 AM   (30 min.)   Eric Duncan MD   [facility] 17     18    MR ABDOMEN WO   2:00 PM   (45 min.)   UUMR1   [facility] Imaging 19     20     21       22     23     24     25    LAB CENTRAL   6:30 AM   (15 min.)   [facility]   [facility]    ONC INFUSION 3 HR (180 MIN)   7:00 AM   (180 min.)   UC ONC INFUSION NURSE   [facility] NURSE VISIT  10:00 AM   (30 min.)   Nurse,  Pcc   [facility] Internal Medicine [place]    RETURN  12:45 PM   (60 min.)   Katherine Pierce MD   [facility] 26     27     28       29     30     31                                         Lab Results:  No results found for this or any previous visit (from the past 12 hour(s)).

## 2021-07-30 NOTE — PROGRESS NOTES
Infusion Nursing Note:  Jennifer Cervantes presents today for IVF/pain medication.    Patient seen by provider today: No   present during visit today: Not Applicable.    Note: Patient endorse non radiating constant sharp back pain not aggravated with activity. Denies fever/chills. Denies nausea/vomiting nor chest and abdominal discomfort. No new concerns made. Otherwise well.    Writer spoken to Amanda DIAZCC, will sent request to been seen by Dr. Duncan on Monday and for infusion appointment on Monday, Wednesday and Friday. Instruction given to patient. Verbalized understanding.    Upon discharge patient had 3 doses of Morphine with PS 5/10. Patient verbalized knowledge on where and when to go if symptoms persists/worsen.     IB sent to Dr. Duncan and Amanda if patient needs blood test on Monday.      Intravenous Access:  Implanted Port.    Treatment Conditions:  Not Applicable.      Post Infusion Assessment:  Patient tolerated infusion without incident.  Blood return noted pre and post infusion.  Site patent and intact, free from redness, edema or discomfort.  No evidence of extravasations.  Access discontinued per protocol.       Discharge Plan:   Patient declined prescription refills.  Discharge instructions reviewed with: Patient.  Patient and/or family verbalized understanding of discharge instructions and all questions answered.  AVS to patient via [employer].  Patient will return 8/2/21 for next appointment.   Patient discharged in stable condition accompanied by: self.  Departure Mode: Ambulatory.      GLORIA YANCEY RN

## 2021-07-31 PROCEDURE — 99285 EMERGENCY DEPT VISIT HI MDM: CPT | Mod: 25 | Performed by: EMERGENCY MEDICINE

## 2021-07-31 PROCEDURE — 99285 EMERGENCY DEPT VISIT HI MDM: CPT | Performed by: EMERGENCY MEDICINE

## 2021-08-01 ENCOUNTER — HOSPITAL ENCOUNTER (EMERGENCY)
Facility: CLINIC | Age: 21
Discharge: HOME | End: 2021-08-01
Attending: EMERGENCY MEDICINE | Admitting: EMERGENCY MEDICINE
Payer: COMMERCIAL

## 2021-08-01 VITALS
BODY MASS INDEX: 29.18 KG/M2 | OXYGEN SATURATION: 97 % | TEMPERATURE: 98.8 F | RESPIRATION RATE: 16 BRPM | WEIGHT: 170 LBS | HEART RATE: 102 BPM | SYSTOLIC BLOOD PRESSURE: 131 MMHG | DIASTOLIC BLOOD PRESSURE: 70 MMHG

## 2021-08-01 DIAGNOSIS — E87.6 HYPOKALEMIA: ICD-10-CM

## 2021-08-01 DIAGNOSIS — D57.00 SICKLE CELL PAIN CRISIS (H): ICD-10-CM

## 2021-08-01 LAB
ANION GAP SERPL CALCULATED.3IONS-SCNC: 9 MMOL/L (ref 3–14)
BASOPHILS # BLD AUTO: 0.2 10E3/UL (ref 0–0.2)
BASOPHILS NFR BLD AUTO: 2 %
BUN SERPL-MCNC: 10 MG/DL (ref 7–30)
CALCIUM SERPL-MCNC: 8.5 MG/DL (ref 8.5–10.1)
CHLORIDE BLD-SCNC: 109 MMOL/L (ref 94–109)
CO2 SERPL-SCNC: 21 MMOL/L (ref 20–32)
CREAT SERPL-MCNC: 0.53 MG/DL (ref 0.52–1.04)
EOSINOPHIL # BLD AUTO: 0.9 10E3/UL (ref 0–0.7)
EOSINOPHIL NFR BLD AUTO: 8 %
ERYTHROCYTE [DISTWIDTH] IN BLOOD BY AUTOMATED COUNT: 22.4 % (ref 10–15)
GFR SERPL CREATININE-BSD FRML MDRD: >90 ML/MIN/1.73M2
GLUCOSE BLD-MCNC: 104 MG/DL (ref 70–99)
HCT VFR BLD AUTO: 22.8 % (ref 35–47)
HGB BLD-MCNC: 8.4 G/DL (ref 11.7–15.7)
IMM GRANULOCYTES # BLD: 0 10E3/UL
IMM GRANULOCYTES NFR BLD: 0 %
LYMPHOCYTES # BLD AUTO: 3.5 10E3/UL (ref 0.8–5.3)
LYMPHOCYTES NFR BLD AUTO: 29 %
MCH RBC QN AUTO: 35.6 PG (ref 26.5–33)
MCHC RBC AUTO-ENTMCNC: 36.8 G/DL (ref 31.5–36.5)
MCV RBC AUTO: 97 FL (ref 78–100)
MONOCYTES # BLD AUTO: 0.8 10E3/UL (ref 0–1.3)
MONOCYTES NFR BLD AUTO: 7 %
NEUTROPHILS # BLD AUTO: 6.5 10E3/UL (ref 1.6–8.3)
NEUTROPHILS NFR BLD AUTO: 54 %
NRBC # BLD AUTO: 0.3 10E3/UL
NRBC BLD AUTO-RTO: 3 /100
PLATELET # BLD AUTO: 291 10E3/UL (ref 150–450)
POTASSIUM BLD-SCNC: 3.2 MMOL/L (ref 3.4–5.3)
RBC # BLD AUTO: 2.36 10E6/UL (ref 3.8–5.2)
RETICS # AUTO: 0.42 10E6/UL (ref 0.03–0.1)
RETICS/RBC NFR AUTO: 18 % (ref 0.5–2)
SODIUM SERPL-SCNC: 139 MMOL/L (ref 133–144)
WBC # BLD AUTO: 11.9 10E3/UL (ref 4–11)

## 2021-08-01 PROCEDURE — 85025 COMPLETE CBC W/AUTO DIFF WBC: CPT | Performed by: EMERGENCY MEDICINE

## 2021-08-01 PROCEDURE — 96376 TX/PRO/DX INJ SAME DRUG ADON: CPT | Performed by: EMERGENCY MEDICINE

## 2021-08-01 PROCEDURE — 85045 AUTOMATED RETICULOCYTE COUNT: CPT | Performed by: EMERGENCY MEDICINE

## 2021-08-01 PROCEDURE — 250N000011 HC RX IP 250 OP 636: Performed by: EMERGENCY MEDICINE

## 2021-08-01 PROCEDURE — 96375 TX/PRO/DX INJ NEW DRUG ADDON: CPT | Performed by: EMERGENCY MEDICINE

## 2021-08-01 PROCEDURE — 250N000013 HC RX MED GY IP 250 OP 250 PS 637: Performed by: EMERGENCY MEDICINE

## 2021-08-01 PROCEDURE — 36415 COLL VENOUS BLD VENIPUNCTURE: CPT | Performed by: EMERGENCY MEDICINE

## 2021-08-01 PROCEDURE — 80048 BASIC METABOLIC PNL TOTAL CA: CPT | Performed by: EMERGENCY MEDICINE

## 2021-08-01 PROCEDURE — 258N000003 HC RX IP 258 OP 636: Performed by: EMERGENCY MEDICINE

## 2021-08-01 PROCEDURE — 96374 THER/PROPH/DIAG INJ IV PUSH: CPT | Performed by: EMERGENCY MEDICINE

## 2021-08-01 PROCEDURE — 96361 HYDRATE IV INFUSION ADD-ON: CPT | Performed by: EMERGENCY MEDICINE

## 2021-08-01 RX ORDER — KETOROLAC TROMETHAMINE 15 MG/ML
15 INJECTION, SOLUTION INTRAMUSCULAR; INTRAVENOUS ONCE
Status: COMPLETED | OUTPATIENT
Start: 2021-08-01 | End: 2021-08-01

## 2021-08-01 RX ORDER — HEPARIN SODIUM (PORCINE) LOCK FLUSH IV SOLN 100 UNIT/ML 100 UNIT/ML
5-10 SOLUTION INTRAVENOUS
Status: DISCONTINUED | OUTPATIENT
Start: 2021-08-01 | End: 2021-08-01 | Stop reason: HOSPADM

## 2021-08-01 RX ORDER — MORPHINE SULFATE 2 MG/ML
2 INJECTION, SOLUTION INTRAMUSCULAR; INTRAVENOUS
Status: COMPLETED | OUTPATIENT
Start: 2021-08-01 | End: 2021-08-01

## 2021-08-01 RX ORDER — POTASSIUM CHLORIDE 20MEQ/15ML
40 LIQUID (ML) ORAL ONCE
Status: COMPLETED | OUTPATIENT
Start: 2021-08-01 | End: 2021-08-01

## 2021-08-01 RX ADMIN — MORPHINE SULFATE 2 MG: 2 INJECTION, SOLUTION INTRAMUSCULAR; INTRAVENOUS at 01:35

## 2021-08-01 RX ADMIN — POTASSIUM CHLORIDE 40 MEQ: 20 SOLUTION ORAL at 03:07

## 2021-08-01 RX ADMIN — Medication 5 ML: at 03:20

## 2021-08-01 RX ADMIN — MORPHINE SULFATE 2 MG: 2 INJECTION, SOLUTION INTRAMUSCULAR; INTRAVENOUS at 03:07

## 2021-08-01 RX ADMIN — SODIUM CHLORIDE, POTASSIUM CHLORIDE, SODIUM LACTATE AND CALCIUM CHLORIDE 1000 ML: 600; 310; 30; 20 INJECTION, SOLUTION INTRAVENOUS at 00:35

## 2021-08-01 RX ADMIN — MORPHINE SULFATE 2 MG: 2 INJECTION, SOLUTION INTRAMUSCULAR; INTRAVENOUS at 00:35

## 2021-08-01 RX ADMIN — KETOROLAC TROMETHAMINE 15 MG: 15 INJECTION, SOLUTION INTRAMUSCULAR; INTRAVENOUS at 00:34

## 2021-08-01 NOTE — ED TRIAGE NOTES
Pt presents with sickle cell pain crisis, reports that she hurts all over but mainly in her low back

## 2021-08-01 NOTE — DISCHARGE INSTRUCTIONS
Thank you for coming to the [facility] Emergency Department.     Please follow up with your Hematology team on Monday if pain continues to be difficult to control at home.     For your low potassium, increase potassium-rich foods in your diet: orange juice, tomatoes, potatoes (with skins on) and leafy green veggies.

## 2021-08-02 ENCOUNTER — INFUSION THERAPY VISIT (OUTPATIENT)
Dept: ONCOLOGY | Facility: CLINIC | Age: 21
End: 2021-08-02
Attending: PEDIATRICS
Payer: COMMERCIAL

## 2021-08-02 VITALS
RESPIRATION RATE: 16 BRPM | DIASTOLIC BLOOD PRESSURE: 74 MMHG | SYSTOLIC BLOOD PRESSURE: 127 MMHG | OXYGEN SATURATION: 96 % | HEART RATE: 101 BPM | TEMPERATURE: 98.1 F

## 2021-08-02 DIAGNOSIS — D57.00 SICKLE CELL PAIN CRISIS (H): Primary | ICD-10-CM

## 2021-08-02 DIAGNOSIS — D57.1 HB-SS DISEASE WITHOUT CRISIS (H): ICD-10-CM

## 2021-08-02 DIAGNOSIS — G89.4 CHRONIC PAIN SYNDROME: Primary | ICD-10-CM

## 2021-08-02 PROCEDURE — G0463 HOSPITAL OUTPT CLINIC VISIT: HCPCS | Mod: 25

## 2021-08-02 PROCEDURE — 96361 HYDRATE IV INFUSION ADD-ON: CPT

## 2021-08-02 PROCEDURE — 96374 THER/PROPH/DIAG INJ IV PUSH: CPT

## 2021-08-02 PROCEDURE — 258N000003 HC RX IP 258 OP 636: Performed by: PEDIATRICS

## 2021-08-02 PROCEDURE — 250N000011 HC RX IP 250 OP 636: Performed by: PEDIATRICS

## 2021-08-02 PROCEDURE — 96376 TX/PRO/DX INJ SAME DRUG ADON: CPT

## 2021-08-02 RX ORDER — ONDANSETRON 8 MG/1
8 TABLET, FILM COATED ORAL
Status: CANCELLED
Start: 2021-08-02

## 2021-08-02 RX ORDER — HEPARIN SODIUM,PORCINE 10 UNIT/ML
5 VIAL (ML) INTRAVENOUS
Status: DISCONTINUED | OUTPATIENT
Start: 2021-08-02 | End: 2021-08-02 | Stop reason: HOSPADM

## 2021-08-02 RX ORDER — MORPHINE SULFATE 2 MG/ML
2 INJECTION, SOLUTION INTRAMUSCULAR; INTRAVENOUS
Status: DISCONTINUED | OUTPATIENT
Start: 2021-08-02 | End: 2021-08-02 | Stop reason: HOSPADM

## 2021-08-02 RX ORDER — HEPARIN SODIUM (PORCINE) LOCK FLUSH IV SOLN 100 UNIT/ML 100 UNIT/ML
5 SOLUTION INTRAVENOUS
Status: DISCONTINUED | OUTPATIENT
Start: 2021-08-02 | End: 2021-08-02 | Stop reason: HOSPADM

## 2021-08-02 RX ORDER — HEPARIN SODIUM,PORCINE 10 UNIT/ML
5 VIAL (ML) INTRAVENOUS
Status: CANCELLED | OUTPATIENT
Start: 2021-08-02

## 2021-08-02 RX ORDER — DIPHENHYDRAMINE HCL 25 MG
25 CAPSULE ORAL
Status: CANCELLED
Start: 2021-08-02

## 2021-08-02 RX ORDER — MORPHINE SULFATE 2 MG/ML
2 INJECTION, SOLUTION INTRAMUSCULAR; INTRAVENOUS
Status: CANCELLED
Start: 2021-08-02

## 2021-08-02 RX ORDER — HEPARIN SODIUM (PORCINE) LOCK FLUSH IV SOLN 100 UNIT/ML 100 UNIT/ML
5 SOLUTION INTRAVENOUS
Status: CANCELLED | OUTPATIENT
Start: 2021-08-02

## 2021-08-02 RX ADMIN — DEXTROSE AND SODIUM CHLORIDE 500 ML: 5; 450 INJECTION, SOLUTION INTRAVENOUS at 07:49

## 2021-08-02 RX ADMIN — MORPHINE SULFATE 2 MG: 2 INJECTION, SOLUTION INTRAMUSCULAR; INTRAVENOUS at 08:50

## 2021-08-02 RX ADMIN — MORPHINE SULFATE 2 MG: 2 INJECTION, SOLUTION INTRAMUSCULAR; INTRAVENOUS at 09:54

## 2021-08-02 RX ADMIN — MORPHINE SULFATE 2 MG: 2 INJECTION, SOLUTION INTRAMUSCULAR; INTRAVENOUS at 07:50

## 2021-08-02 RX ADMIN — Medication 5 ML: at 10:11

## 2021-08-02 NOTE — PATIENT INSTRUCTIONS
[facility] Triage and after hours / weekends / holidays:  [phone]    Please call the triage or after hours line if you experience a temperature greater than or equal to 100.5, shaking chills, have uncontrolled nausea, vomiting and/or diarrhea, dizziness, shortness of breath, chest pain, bleeding, unexplained bruising, or if you have any other new/concerning symptoms, questions or concerns.      If you are having any concerning symptoms or wish to speak to a provider before your next infusion visit, please call your care coordinator or triage to notify them so we can adequately serve you.     If you need a refill on a narcotic prescription or other medication, please call before your infusion appointment.                 August 2021 Sunday Monday Tuesday Wednesday Thursday Friday Saturday   1    Admission  12:10 AM   [facility] Emergency Department   (Discharge: 8/1/2021) 2    ONC INFUSION 2 HR (120 MIN)   7:30 AM   (120 min.)    ONC INFUSION NURSE   [facility]    RETURN  11:15 AM   (30 min.)   Eric Duncan MD   [facility] 3     4     5     6     7       8     9     10     11     12     13     LWR EXT VENOUS DUPLEX LEFT   8:30 AM   (30 min.)   UCSCUSV1   [facility]    ONC INFUSION 2 HR (120 MIN)   2:30 PM   (120 min.)    ONC INFUSION NURSE   [facility] 14       15     16    HAND INITIAL   8:45 AM   (60 min.)   Franchesca Tucker OT   [facility] [place]    VIDEO VISIT RETURN  10:15 AM   (30 min.)   Suraj Case MD   [facility] Internal Medicine [place]    LAB CENTRAL  11:15 AM   (15 min.)   [facility]   [facility]    RETURN  11:45 AM   (30 min.)   Eric Duncan MD   [facility] 17     18    MR ABDOMEN WO   2:00 PM   (45 min.)    UUMR1   [facility] Imaging 19     20     21       22     23     24     25    LAB CENTRAL   6:30 AM   (15 min.)    [facility]   [facility]    ONC INFUSION 3 HR (180 MIN)   7:00 AM   (180 min.)    ONC INFUSION NURSE   [facility] NURSE VISIT  10:00 AM   (30 min.)   Nurse,  Pcc   [facility] Internal Medicine [place]    RETURN  12:45 PM   (60 min.)   Katherine Pierce MD   [facility] 26 27 28 29 30 31 September 2021 Sunday Monday Tuesday Wednesday Thursday Friday Saturday                  1     2     3     4       5     6     7     8     9     10     11       12     13     14     15     16     17     18       19     20    LAB CENTRAL  11:15 AM   (15 min.)   [facility]   [facility]    RETURN  11:45 AM   (30 min.)   Eric Duncan MD   [facility] 21     22    ONC INFUSION 3 HR (180 MIN)   9:00 AM   (180 min.)    ONC INFUSION NURSE   [facility] 23     24     25       26     27     28     29     30                               Lab Results:  No results found for this or any previous visit (from the past 12 hour(s)).

## 2021-08-02 NOTE — PROGRESS NOTES
Infusion Nursing Note:  Jennifer Cervantes presents today for IVF's, pain meds.    Patient seen by provider today: No   present during visit today: Not Applicable.    Note: Patient presents to infusion with 8/10 low back pain. Patient did go to ER yesterday for unmanageable pain with some relief. Patient also received 40 MEQ of potassium for a potassium of 3.2. Today patient endorses no further acute complaints or concerns other then the low back discomfort. Patient specifically denies s/s of infection such as fever, sore throat, cough, chest pain, shortness of breath, or changes in taste/smell. Per infusion note from Friday, patient is to have a Dr. Duncan appointment scheduled today along with IVF's/pain meds on Wednesday and Friday this week.     Called Dr. Duncan to update him on ER visit yesterday and to confirm overall plan for the week. Also labs, specifically WBC and potassium, reviewed.   TORB. 8/2/2021. 0810. Dr. Duncan. Rik Ayers RN. No labs today. Patient will be called for a check in. If appointment is needed, will look into adding patient to Andrei HIGGINS's schedule. Patient to receive IVF/pain meds [id number] this week. If patient goes to the ER on her day off from infusion, strategy to be re-evaluated for the week.     Patient made aware of the above information from Dr. Duncan and verbalizes understanding.      Intravenous Access:  Implanted Port.    Treatment Conditions:  Not Applicable.      Post Infusion Assessment:  Patient tolerated infusion without incident.  Post IVF's and 3 doses of IV Morphine, patient rates pain 6/10 and states she is comfortable going home.  Blood return noted pre and post infusion.  Site patent and intact, free from redness, edema or discomfort.  No evidence of extravasations.  Access discontinued per protocol.       Discharge Plan:   Patient declined prescription refills.  Discharge instructions reviewed with: Patient.  Patient  and/or family verbalized understanding of discharge instructions and all questions answered.  AVS to patient via [employer].  Patient will return 8/4 for next appointment. Patient on waitlist for Wednesday's appointment. Patient voices understanding that scheduling will call her when we can add her to our schedule.  Patient discharged in stable condition accompanied by: self.  Departure Mode: Ambulatory.  Face to Face time: 0 minutes.      Rik Ayers RN

## 2021-08-02 NOTE — LETTER
8/2/2021         RE: Jennifer Cervantes  [address]  [place] [address]        Dear Colleague,    Thank you for referring your patient, Jennifer Cervantes, to the [facility]. Please see a copy of my visit note below.    Pt not seen today by provider. Received infusion and I called patient later to follow up.    David Duncan MD      Again, thank you for allowing me to participate in the care of your patient.        Sincerely,        Eric Duncan MD

## 2021-08-03 ENCOUNTER — HOME INFUSION (PRE-WILLOW HOME INFUSION) (OUTPATIENT)
Dept: PHARMACY | Facility: CLINIC | Age: 21
End: 2021-08-03

## 2021-08-03 ENCOUNTER — TELEPHONE (OUTPATIENT)
Dept: INFUSION THERAPY | Facility: CLINIC | Age: 21
End: 2021-08-03

## 2021-08-03 ENCOUNTER — HOSPITAL ENCOUNTER (EMERGENCY)
Facility: CLINIC | Age: 21
Discharge: HOME | End: 2021-08-03
Attending: EMERGENCY MEDICINE | Admitting: EMERGENCY MEDICINE
Payer: COMMERCIAL

## 2021-08-03 VITALS
BODY MASS INDEX: 29.02 KG/M2 | TEMPERATURE: 98.4 F | RESPIRATION RATE: 16 BRPM | HEART RATE: 100 BPM | HEIGHT: 64 IN | OXYGEN SATURATION: 98 % | DIASTOLIC BLOOD PRESSURE: 61 MMHG | SYSTOLIC BLOOD PRESSURE: 112 MMHG | WEIGHT: 170 LBS

## 2021-08-03 DIAGNOSIS — D57.00 SICKLE CELL PAIN CRISIS (H): ICD-10-CM

## 2021-08-03 LAB
ALBUMIN SERPL-MCNC: 3.6 G/DL (ref 3.4–5)
ALP SERPL-CCNC: 66 U/L (ref 40–150)
ALT SERPL W P-5'-P-CCNC: 67 U/L (ref 0–50)
ANION GAP SERPL CALCULATED.3IONS-SCNC: 6 MMOL/L (ref 3–14)
AST SERPL W P-5'-P-CCNC: 70 U/L (ref 0–45)
BASOPHILS # BLD AUTO: 0.2 10E3/UL (ref 0–0.2)
BASOPHILS NFR BLD AUTO: 2 %
BILIRUB SERPL-MCNC: 2.3 MG/DL (ref 0.2–1.3)
BUN SERPL-MCNC: 8 MG/DL (ref 7–30)
CALCIUM SERPL-MCNC: 8.2 MG/DL (ref 8.5–10.1)
CHLORIDE BLD-SCNC: 112 MMOL/L (ref 94–109)
CO2 SERPL-SCNC: 22 MMOL/L (ref 20–32)
CREAT SERPL-MCNC: 0.47 MG/DL (ref 0.52–1.04)
EOSINOPHIL # BLD AUTO: 0.8 10E3/UL (ref 0–0.7)
EOSINOPHIL NFR BLD AUTO: 6 %
ERYTHROCYTE [DISTWIDTH] IN BLOOD BY AUTOMATED COUNT: 22 % (ref 10–15)
GFR SERPL CREATININE-BSD FRML MDRD: >90 ML/MIN/1.73M2
GLUCOSE BLD-MCNC: 95 MG/DL (ref 70–99)
HCT VFR BLD AUTO: 21.1 % (ref 35–47)
HGB BLD-MCNC: 7.6 G/DL (ref 11.7–15.7)
HOLD SPECIMEN: NORMAL
HOLD SPECIMEN: NORMAL
IMM GRANULOCYTES # BLD: 0.1 10E3/UL
IMM GRANULOCYTES NFR BLD: 0 %
LYMPHOCYTES # BLD AUTO: 3.3 10E3/UL (ref 0.8–5.3)
LYMPHOCYTES NFR BLD AUTO: 25 %
MCH RBC QN AUTO: 34.2 PG (ref 26.5–33)
MCHC RBC AUTO-ENTMCNC: 36 G/DL (ref 31.5–36.5)
MCV RBC AUTO: 95 FL (ref 78–100)
MONOCYTES # BLD AUTO: 0.9 10E3/UL (ref 0–1.3)
MONOCYTES NFR BLD AUTO: 7 %
NEUTROPHILS # BLD AUTO: 7.6 10E3/UL (ref 1.6–8.3)
NEUTROPHILS NFR BLD AUTO: 60 %
NRBC # BLD AUTO: 0.2 10E3/UL
NRBC BLD AUTO-RTO: 2 /100
PLATELET # BLD AUTO: 325 10E3/UL (ref 150–450)
POTASSIUM BLD-SCNC: 3.4 MMOL/L (ref 3.4–5.3)
PROT SERPL-MCNC: 7.2 G/DL (ref 6.8–8.8)
RBC # BLD AUTO: 2.22 10E6/UL (ref 3.8–5.2)
RETICS # AUTO: 0.45 10E6/UL (ref 0.03–0.1)
RETICS/RBC NFR AUTO: 20.2 % (ref 0.5–2)
SODIUM SERPL-SCNC: 140 MMOL/L (ref 133–144)
WBC # BLD AUTO: 12.8 10E3/UL (ref 4–11)

## 2021-08-03 PROCEDURE — 80053 COMPREHEN METABOLIC PANEL: CPT | Performed by: EMERGENCY MEDICINE

## 2021-08-03 PROCEDURE — 96374 THER/PROPH/DIAG INJ IV PUSH: CPT

## 2021-08-03 PROCEDURE — 85045 AUTOMATED RETICULOCYTE COUNT: CPT | Performed by: EMERGENCY MEDICINE

## 2021-08-03 PROCEDURE — 96376 TX/PRO/DX INJ SAME DRUG ADON: CPT

## 2021-08-03 PROCEDURE — 99285 EMERGENCY DEPT VISIT HI MDM: CPT | Mod: 25

## 2021-08-03 PROCEDURE — 99285 EMERGENCY DEPT VISIT HI MDM: CPT | Performed by: EMERGENCY MEDICINE

## 2021-08-03 PROCEDURE — 82310 ASSAY OF CALCIUM: CPT | Performed by: EMERGENCY MEDICINE

## 2021-08-03 PROCEDURE — 36415 COLL VENOUS BLD VENIPUNCTURE: CPT | Performed by: EMERGENCY MEDICINE

## 2021-08-03 PROCEDURE — 250N000011 HC RX IP 250 OP 636: Performed by: EMERGENCY MEDICINE

## 2021-08-03 PROCEDURE — 96361 HYDRATE IV INFUSION ADD-ON: CPT

## 2021-08-03 PROCEDURE — 85025 COMPLETE CBC W/AUTO DIFF WBC: CPT | Performed by: EMERGENCY MEDICINE

## 2021-08-03 PROCEDURE — 258N000003 HC RX IP 258 OP 636: Performed by: EMERGENCY MEDICINE

## 2021-08-03 RX ORDER — MORPHINE SULFATE 2 MG/ML
2 INJECTION, SOLUTION INTRAMUSCULAR; INTRAVENOUS
Status: DISCONTINUED | OUTPATIENT
Start: 2021-08-03 | End: 2021-08-03 | Stop reason: HOSPADM

## 2021-08-03 RX ORDER — HEPARIN SODIUM (PORCINE) LOCK FLUSH IV SOLN 100 UNIT/ML 100 UNIT/ML
5-10 SOLUTION INTRAVENOUS
Status: COMPLETED | OUTPATIENT
Start: 2021-08-03 | End: 2021-08-03

## 2021-08-03 RX ORDER — SODIUM CHLORIDE, SODIUM LACTATE, POTASSIUM CHLORIDE, CALCIUM CHLORIDE 600; 310; 30; 20 MG/100ML; MG/100ML; MG/100ML; MG/100ML
INJECTION, SOLUTION INTRAVENOUS CONTINUOUS
Status: DISCONTINUED | OUTPATIENT
Start: 2021-08-03 | End: 2021-08-03 | Stop reason: HOSPADM

## 2021-08-03 RX ADMIN — SODIUM CHLORIDE, POTASSIUM CHLORIDE, SODIUM LACTATE AND CALCIUM CHLORIDE: 600; 310; 30; 20 INJECTION, SOLUTION INTRAVENOUS at 01:05

## 2021-08-03 RX ADMIN — MORPHINE SULFATE 2 MG: 2 INJECTION, SOLUTION INTRAMUSCULAR; INTRAVENOUS at 05:19

## 2021-08-03 RX ADMIN — Medication 5 ML: at 06:26

## 2021-08-03 RX ADMIN — MORPHINE SULFATE 2 MG: 2 INJECTION, SOLUTION INTRAMUSCULAR; INTRAVENOUS at 04:15

## 2021-08-03 RX ADMIN — MORPHINE SULFATE 2 MG: 2 INJECTION, SOLUTION INTRAMUSCULAR; INTRAVENOUS at 01:05

## 2021-08-03 ASSESSMENT — ENCOUNTER SYMPTOMS
ABDOMINAL PAIN: 0
WEAKNESS: 0
FEVER: 0
BRUISES/BLEEDS EASILY: 0
BACK PAIN: 0
DYSURIA: 0
CONFUSION: 0
SHORTNESS OF BREATH: 0

## 2021-08-03 ASSESSMENT — MIFFLIN-ST. JEOR: SCORE: 1516.11

## 2021-08-03 NOTE — TELEPHONE ENCOUNTER
Anyi from [facility] called and asked if pt can switch from infusions of Desferal to subcutaneous injections? Message sent to Dr. Duncan and Lien Nicolas RN Care Coordinator.

## 2021-08-03 NOTE — PROGRESS NOTES
This is a recent snapshot of the patient's [facility] medical record.  For current drug dose and complete information and questions, call [phone] or In Basket pool, fv home infusion ([id number])  CSN Number:  [id number]

## 2021-08-03 NOTE — PROGRESS NOTES
This is a recent snapshot of the patient's [facility] medical record.  For current drug dose and complete information and questions, call [phone] or In Basket pool, fv home infusion ([id number])  CSN Number:  [id number]

## 2021-08-03 NOTE — ED PROVIDER NOTES
ED Provider Note  [facility]      History     Chief Complaint   Patient presents with     Sickle Cell Pain Crisis     HPI  Jennifer Cervantes is a pleasant 21 year old female with a history of sickle cell disease, previous CVA, depressive disorder, migraines, and development deelay who came into the ED for evaluation of sickle cell pain crisis. Patient c/o of b/l upper and lower extremity pain that has been ongoing for the past 2 hours. Patient reports she was sleeping at home when the pain began. Patient describes the pain as a constant sharp pain with no radiation, no aggravating, no alleviating factors. Reports that she took her medications which did not help. Denies fever,  nausea, vomiting, abdominal pain, shortness of breath, chest pain, vision changes, or headache. No other complaints. Patient reports similar symptoms to prior pain crisis in the past.     Past Medical History  Past Medical History:   Diagnosis Date     Anxiety      Bleeding disorder (H)      Blood clotting disorder (H)      Cerebral infarction (H) 2015     Cognitive developmental delay     low IQ. 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 (H)     right hand contractures     Iron overload due to repeated red blood cell transfusions      Migraines      Multiple subsegmental pulmonary emboli without acute cor pulmonale (H) 02/01/2021     Oppositional defiant behavior      Superficial venous thrombosis of arm, right 03/25/2021     Uncomplicated asthma      Past Surgical History:   Procedure Laterality Date     AS INSERT TUNNELED CV 2 CATH W/O PORT/PUMP       C BREAST REDUCTION (INCLUDES LIPO) TIER 3 Bilateral 04/23/2019     CHOLECYSTECTOMY       INSERT PORT VASCULAR ACCESS Left 4/21/2021    Procedure: INSERTION, VASCULAR ACCESS PORT (NOT SURE ON SIDE UNTIL REMOVAL);  Surgeon: Rajan More MD;   Location: [facility] OR     IR CHEST PORT PLACEMENT > 5 YRS OF AGE  4/21/2021     IR CVC NON TUNNEL LINE REMOVAL  5/6/2021     IR CVC NON TUNNEL PLACEMENT  04/07/2020     IR CVC NON TUNNEL PLACEMENT  4/30/2021     IR PORT REMOVAL LEFT  4/21/2021     REMOVE PORT VASCULAR ACCESS Left 4/21/2021    Procedure: REMOVAL, VASCULAR ACCESS PORT LEFT;  Surgeon: Rajan More MD;  Location: [facility] OR     REPAIR TENDON ELBOW Right 10/02/2019    Procedure: Right Elbow Flexor Lengthening, Flexor Pronator Slide Of Wrist and Finger, Thumb Adductor Lengthening;  Surgeon: Anai Franco MD;  [facility]     TONSILLECTOMY Bilateral 10/02/2019    Procedure: Bilateral Tonsillectomy;  Surgeon: Farhana Guy MD;  [facility]     acetaminophen (TYLENOL) 325 MG tablet  oxyCODONE (OXYCONTIN) 10 MG 12 hr tablet  oxyCODONE IR (ROXICODONE) 15 MG tablet  albuterol (PROAIR HFA/PROVENTIL HFA/VENTOLIN HFA) 108 (90 Base) MCG/ACT inhaler  albuterol (PROVENTIL) (2.5 MG/3ML) 0.083% neb solution  ARIPiprazole (ABILIFY) 2 MG tablet  aspirin (ASA) 81 MG chewable tablet  budesonide-formoterol (SYMBICORT) 160-4.5 MCG/ACT Inhaler  dabigatran ANTICOAGULANT (PRADAXA) 150 MG capsule  diclofenac (VOLTAREN) 1 % topical gel  diphenhydrAMINE (BENADRYL) 25 MG capsule  EPINEPHrine (ANY BX GENERIC EQUIV) 0.3 MG/0.3ML injection 2-pack  Hydroxyurea 1000 MG TABS  hydrOXYzine (ATARAX) 25 MG tablet  JADENU 360 MG tablet  lidocaine-prilocaine (EMLA) 2.5-2.5 % external cream  medroxyPROGESTERone (DEPO-PROVERA) 150 MG/ML IM injection  naloxone (NARCAN) 4 MG/0.1ML nasal spray  ondansetron (ZOFRAN) 8 MG tablet  sertraline (ZOLOFT) 100 MG tablet      Allergies   Allergen Reactions     Contrast Dye      Hives and breathing issues     Fish-Derived Products Hives     Seafood Hives     Diagnostic X-Ray Materials      Gadolinium      Social History   Social History     Tobacco Use     Smoking status: Never Smoker     Smokeless tobacco: Never Used   Substance Use  "Topics     Alcohol use: Not Currently     Alcohol/week: 0.0 standard drinks     Drug use: Never      Past medical history and social history were reviewed with the patient. Additional pertinent items: None      Review of Systems   Constitutional: Negative for fever.   HENT: Negative for congestion.    Eyes: Negative for visual disturbance.   Respiratory: Negative for shortness of breath.    Cardiovascular: Negative for chest pain.   Gastrointestinal: Negative for abdominal pain.   Endocrine: Negative for polyuria.   Genitourinary: Negative for dysuria.   Musculoskeletal: Negative for back pain.        +b/l upper and lower extremity pain    Skin: Negative for rash.   Allergic/Immunologic: Positive for immunocompromised state.   Neurological: Negative for weakness.   Hematological: Does not bruise/bleed easily.   Psychiatric/Behavioral: Negative for confusion.     Physical Exam   BP: (!) 140/70  Pulse: 102  Temp: 98.6  F (37  C)  Resp: 16  Height: 162.6 cm (5' 4\")  Weight: 77.1 kg (170 lb)  SpO2: 92 %  Physical Exam  Constitutional:       Appearance: Normal appearance.      Comments: Looks uncomfortable with movement, comfortable if lying still in bed   HENT:      Head: Normocephalic and atraumatic.      Nose: Nose normal.      Mouth/Throat:      Mouth: Mucous membranes are moist.   Eyes:      Extraocular Movements: Extraocular movements intact.      Conjunctiva/sclera: Conjunctivae normal.      Pupils: Pupils are equal, round, and reactive to light.   Cardiovascular:      Rate and Rhythm: Normal rate and regular rhythm.      Pulses: Normal pulses.      Heart sounds: Normal heart sounds.   Pulmonary:      Effort: Pulmonary effort is normal.      Breath sounds: Normal breath sounds.   Abdominal:      General: Bowel sounds are normal.      Palpations: Abdomen is soft.   Musculoskeletal:         General: Normal range of motion.      Cervical back: Normal range of motion and neck supple.   Skin:     General: Skin is warm " and dry.      Capillary Refill: Capillary refill takes less than 2 seconds.   Neurological:      Mental Status: She is alert and oriented to person, place, and time.   Psychiatric:         Mood and Affect: Mood normal.       ED Course      Procedures  The medical record was reviewed and interpreted.  Current labs reviewed and interpreted.  Previous labs reviewed and interpreted.       Results for orders placed or performed during the hospital encounter of 08/03/21   Comprehensive metabolic panel     Status: Abnormal   Result Value Ref Range    Sodium 140 133 - 144 mmol/L    Potassium 3.4 3.4 - 5.3 mmol/L    Chloride 112 (H) 94 - 109 mmol/L    Carbon Dioxide (CO2) 22 20 - 32 mmol/L    Anion Gap 6 3 - 14 mmol/L    Urea Nitrogen 8 7 - 30 mg/dL    Creatinine 0.47 (L) 0.52 - 1.04 mg/dL    Calcium 8.2 (L) 8.5 - 10.1 mg/dL    Glucose 95 70 - 99 mg/dL    Alkaline Phosphatase 66 40 - 150 U/L    AST 70 (H) 0 - 45 U/L    ALT 67 (H) 0 - 50 U/L    Protein Total 7.2 6.8 - 8.8 g/dL    Albumin 3.6 3.4 - 5.0 g/dL    Bilirubin Total 2.3 (H) 0.2 - 1.3 mg/dL    GFR Estimate >90 >60 mL/min/1.73m2   Reticulocyte count     Status: Abnormal   Result Value Ref Range    % Reticulocyte 20.2 (H) 0.5 - 2.0 %    Absolute Reticulocyte 0.449 (H) 0.025 - 0.095 10e6/uL   CBC with platelets and differential     Status: Abnormal   Result Value Ref Range    WBC Count 12.8 (H) 4.0 - 11.0 10e3/uL    RBC Count 2.22 (L) 3.80 - 5.20 10e6/uL    Hemoglobin 7.6 (L) 11.7 - 15.7 g/dL    Hematocrit 21.1 (L) 35.0 - 47.0 %    MCV 95 78 - 100 fL    MCH 34.2 (H) 26.5 - 33.0 pg    MCHC 36.0 31.5 - 36.5 g/dL    RDW 22.0 (H) 10.0 - 15.0 %    Platelet Count 325 150 - 450 10e3/uL    % Neutrophils 60 %    % Lymphocytes 25 %    % Monocytes 7 %    % Eosinophils 6 %    % Basophils 2 %    % Immature Granulocytes 0 %    NRBCs per 100 WBC 2 (H) <1 /100    Absolute Neutrophils 7.6 1.6 - 8.3 10e3/uL    Absolute Lymphocytes 3.3 0.8 - 5.3 10e3/uL    Absolute Monocytes 0.9 0.0 - 1.3  10e3/uL    Absolute Eosinophils 0.8 (H) 0.0 - 0.7 10e3/uL    Absolute Basophils 0.2 0.0 - 0.2 10e3/uL    Absolute Immature Granulocytes 0.1 (H) <=0.0 10e3/uL    Absolute NRBCs 0.2 10e3/uL   Extra Blue Top Tube     Status: None   Result Value Ref Range    Hold Specimen JIC    Extra Red Top Tube     Status: None   Result Value Ref Range    Hold Specimen JIC    CBC with platelets differential     Status: Abnormal    Narrative    The following orders were created for panel order CBC with platelets differential.  Procedure                               Abnormality         Status                     ---------                               -----------         ------                     CBC with platelets and d...[[id number]]  Abnormal            Final result                 Please view results for these tests on the individual orders.   [place] Draw     Status: None    Narrative    The following orders were created for panel order [place] Draw.  Procedure                               Abnormality         Status                     ---------                               -----------         ------                     Extra Blue Top Tube[[id number]]                              Final result               Extra Red Top Tube[[id number]]                               Final result                 Please view results for these tests on the individual orders.     Medications   heparin 100 UNIT/ML injection 5-10 mL (5 mLs Intracatheter Given 8/3/21 0626)        Assessments & Plan (with Medical Decision Making)   21 year old female with a history of sickle cell disease, previous CVA, depressive disorder, migraines, and development deelay who came into the ED with a complaint of bilateral upper and lower extremity pain for the past 2 hours similar to her sickle cell pain crisis in the past.      Upon arrival patient is well-appearing, afebrile, no distress.  I suspect clinical symptoms are most consistent with sickle cell pain crisis,  at this time will obtain comprehensive labs, will treat with IV morphine per pain patient's care plan, and reevaluate.  Reviewed comprehensive labs which are remarkable for leukocytosis with blood cell count 12.8, hemoglobin 7.6, reticulocyte count 20.2%, no acute metabolic or electrolyte abnormality, laboratory testing similar to prior results.  Patient is afebrile, no chest pain, no shortness of breath, no hypoxia.  No signs of acute infection.  On reevaluation patient feeling better after maintenance IV fluids, 3 doses of morphine, and is requesting discharge.  Encourage patient to follow-up with her care team in clinic and return precautions discussed if high fever, severe pain, shortness of breath, chest pain, any worsening symptoms.  Patient understands and agrees with plan.        I have reviewed the nursing notes. I have reviewed the findings, diagnosis, plan and need for follow up with the patient.    Discharge Medication List as of 8/3/2021  6:23 AM          Final diagnoses:   Sickle cell pain crisis (H)       --  Jamee Martin MD  [facility] EMERGENCY DEPARTMENT  8/3/2021     Jamee Martin MD  08/04/21 0036

## 2021-08-03 NOTE — PROGRESS NOTES
Pt not seen today by provider. Received infusion and I called patient later to follow up.    David Duncan MD

## 2021-08-03 NOTE — ED TRIAGE NOTES
Reports pain that started today in bilateral arms/legs rated 10/10, tried home medications with no relief.

## 2021-08-04 ENCOUNTER — INFUSION THERAPY VISIT (OUTPATIENT)
Dept: TRANSPLANT | Facility: CLINIC | Age: 21
End: 2021-08-04
Attending: INTERNAL MEDICINE
Payer: COMMERCIAL

## 2021-08-04 VITALS
TEMPERATURE: 99.3 F | BODY MASS INDEX: 28.44 KG/M2 | HEART RATE: 107 BPM | SYSTOLIC BLOOD PRESSURE: 138 MMHG | WEIGHT: 165.7 LBS | RESPIRATION RATE: 16 BRPM | DIASTOLIC BLOOD PRESSURE: 83 MMHG | OXYGEN SATURATION: 93 %

## 2021-08-04 DIAGNOSIS — D57.00 SICKLE CELL PAIN CRISIS (H): Primary | ICD-10-CM

## 2021-08-04 LAB
ABO/RH(D): NORMAL
ANTIBODY SCREEN: NORMAL

## 2021-08-04 PROCEDURE — 96361 HYDRATE IV INFUSION ADD-ON: CPT

## 2021-08-04 PROCEDURE — 250N000011 HC RX IP 250 OP 636: Performed by: PEDIATRICS

## 2021-08-04 PROCEDURE — 96376 TX/PRO/DX INJ SAME DRUG ADON: CPT

## 2021-08-04 PROCEDURE — 258N000003 HC RX IP 258 OP 636: Performed by: PEDIATRICS

## 2021-08-04 PROCEDURE — 96374 THER/PROPH/DIAG INJ IV PUSH: CPT

## 2021-08-04 RX ORDER — ONDANSETRON 8 MG/1
8 TABLET, FILM COATED ORAL
Status: CANCELLED
Start: 2021-08-04

## 2021-08-04 RX ORDER — MORPHINE SULFATE 2 MG/ML
2 INJECTION, SOLUTION INTRAMUSCULAR; INTRAVENOUS
Status: CANCELLED
Start: 2021-08-04

## 2021-08-04 RX ORDER — DIPHENHYDRAMINE HCL 25 MG
25 CAPSULE ORAL
Status: CANCELLED
Start: 2021-08-04

## 2021-08-04 RX ORDER — HEPARIN SODIUM,PORCINE 10 UNIT/ML
5 VIAL (ML) INTRAVENOUS
Status: CANCELLED | OUTPATIENT
Start: 2021-08-04

## 2021-08-04 RX ORDER — HEPARIN SODIUM (PORCINE) LOCK FLUSH IV SOLN 100 UNIT/ML 100 UNIT/ML
5 SOLUTION INTRAVENOUS
Status: CANCELLED | OUTPATIENT
Start: 2021-08-04

## 2021-08-04 RX ORDER — MORPHINE SULFATE 2 MG/ML
2 INJECTION, SOLUTION INTRAMUSCULAR; INTRAVENOUS
Status: COMPLETED | OUTPATIENT
Start: 2021-08-04 | End: 2021-08-04

## 2021-08-04 RX ADMIN — DEXTROSE AND SODIUM CHLORIDE 500 ML: 5; 450 INJECTION, SOLUTION INTRAVENOUS at 13:30

## 2021-08-04 RX ADMIN — MORPHINE SULFATE 2 MG: 2 INJECTION, SOLUTION INTRAMUSCULAR; INTRAVENOUS at 14:30

## 2021-08-04 RX ADMIN — MORPHINE SULFATE 2 MG: 2 INJECTION, SOLUTION INTRAMUSCULAR; INTRAVENOUS at 15:33

## 2021-08-04 RX ADMIN — MORPHINE SULFATE 2 MG: 2 INJECTION, SOLUTION INTRAMUSCULAR; INTRAVENOUS at 13:34

## 2021-08-04 ASSESSMENT — PAIN SCALES - GENERAL
PAINLEVEL: EXTREME PAIN (8)
PAINLEVEL: SEVERE PAIN (7)

## 2021-08-04 NOTE — PROGRESS NOTES
Infusion Nursing Note:  Jennifer Cervantes presents today for IVF and pain meds.    Patient seen by provider today: No   present during visit today: Not Applicable.    Note: Patient here for IVF and pain medications for sickle cell crisis. Patient reported pain 8/10 in her low back and legs when she arrived today. IVF per therapy plan started over 2 hours. She received 3 doses of IV Morphine q 1hour. She tolerated infusion well without any issues or side affects and felt comfortable leaving after her third dose of morphine.      Intravenous Access:  Implanted Port.    Treatment Conditions:  Not Applicable.      Post Infusion Assessment:  Patient tolerated infusion without incident.  Blood return noted pre and post infusion.  Site patent and intact, free from redness, edema or discomfort.  No evidence of extravasations.  Access discontinued per protocol.       Discharge Plan:   Patient discharged in stable condition accompanied by: self.  Departure Mode: Ambulatory to meet her ride.      PAULINE VANEGAS RN

## 2021-08-04 NOTE — LETTER
8/4/2021         RE: Jennifer Cervantes  [address]  [facility] [address]        Dear Colleague,    Thank you for referring your patient, Jennifer Cervantes, to the [facility] [place]. Please see a copy of my visit note below.    Infusion Nursing Note:  Jennifer Cervantes presents today for IVF and pain meds.    Patient seen by provider today: No   present during visit today: Not Applicable.    Note: Patient here for IVF and pain medications for sickle cell crisis. Patient reported pain 8/10 in her low back and legs when she arrived today. IVF per therapy plan started over 2 hours. She received 3 doses of IV Morphine q 1hour. She tolerated infusion well without any issues or side affects and felt comfortable leaving after her third dose of morphine.      Intravenous Access:  Implanted Port.    Treatment Conditions:  Not Applicable.      Post Infusion Assessment:  Patient tolerated infusion without incident.  Blood return noted pre and post infusion.  Site patent and intact, free from redness, edema or discomfort.  No evidence of extravasations.  Access discontinued per protocol.       Discharge Plan:   Patient discharged in stable condition accompanied by: self.  Departure Mode: Ambulatory to meet her ride.      PAULINE VANEGAS RN                          Again, thank you for allowing me to participate in the care of your patient.        Sincerely,        [facility] [place]

## 2021-08-05 ENCOUNTER — HOSPITAL ENCOUNTER (EMERGENCY)
Facility: CLINIC | Age: 21
Discharge: HOME | End: 2021-08-05
Attending: EMERGENCY MEDICINE | Admitting: EMERGENCY MEDICINE
Payer: COMMERCIAL

## 2021-08-05 VITALS
HEART RATE: 101 BPM | SYSTOLIC BLOOD PRESSURE: 145 MMHG | RESPIRATION RATE: 16 BRPM | DIASTOLIC BLOOD PRESSURE: 88 MMHG | OXYGEN SATURATION: 99 % | TEMPERATURE: 98.9 F

## 2021-08-05 DIAGNOSIS — D57.00 SICKLE CELL PAIN CRISIS (H): ICD-10-CM

## 2021-08-05 LAB
BASOPHILS # BLD AUTO: 0.2 10E3/UL (ref 0–0.2)
BASOPHILS NFR BLD AUTO: 2 %
EOSINOPHIL # BLD AUTO: 0.9 10E3/UL (ref 0–0.7)
EOSINOPHIL NFR BLD AUTO: 7 %
ERYTHROCYTE [DISTWIDTH] IN BLOOD BY AUTOMATED COUNT: 22.7 % (ref 10–15)
HCT VFR BLD AUTO: 21.4 % (ref 35–47)
HGB BLD-MCNC: 7.4 G/DL (ref 11.7–15.7)
IMM GRANULOCYTES # BLD: 0 10E3/UL
IMM GRANULOCYTES NFR BLD: 0 %
LYMPHOCYTES # BLD AUTO: 3.3 10E3/UL (ref 0.8–5.3)
LYMPHOCYTES NFR BLD AUTO: 25 %
MCH RBC QN AUTO: 33.6 PG (ref 26.5–33)
MCHC RBC AUTO-ENTMCNC: 34.6 G/DL (ref 31.5–36.5)
MCV RBC AUTO: 97 FL (ref 78–100)
MONOCYTES # BLD AUTO: 1.2 10E3/UL (ref 0–1.3)
MONOCYTES NFR BLD AUTO: 9 %
NEUTROPHILS # BLD AUTO: 7.6 10E3/UL (ref 1.6–8.3)
NEUTROPHILS NFR BLD AUTO: 57 %
NRBC # BLD AUTO: 0.3 10E3/UL
NRBC BLD AUTO-RTO: 3 /100
PLATELET # BLD AUTO: 306 10E3/UL (ref 150–450)
RBC # BLD AUTO: 2.2 10E6/UL (ref 3.8–5.2)
RETICS # AUTO: 0.47 10E6/UL (ref 0.03–0.1)
RETICS/RBC NFR AUTO: 21.4 % (ref 0.5–2)
WBC # BLD AUTO: 13.3 10E3/UL (ref 4–11)

## 2021-08-05 PROCEDURE — 99285 EMERGENCY DEPT VISIT HI MDM: CPT | Performed by: EMERGENCY MEDICINE

## 2021-08-05 PROCEDURE — 258N000003 HC RX IP 258 OP 636: Performed by: EMERGENCY MEDICINE

## 2021-08-05 PROCEDURE — 85045 AUTOMATED RETICULOCYTE COUNT: CPT | Performed by: EMERGENCY MEDICINE

## 2021-08-05 PROCEDURE — 96361 HYDRATE IV INFUSION ADD-ON: CPT | Performed by: EMERGENCY MEDICINE

## 2021-08-05 PROCEDURE — 96374 THER/PROPH/DIAG INJ IV PUSH: CPT | Performed by: EMERGENCY MEDICINE

## 2021-08-05 PROCEDURE — 96376 TX/PRO/DX INJ SAME DRUG ADON: CPT | Performed by: EMERGENCY MEDICINE

## 2021-08-05 PROCEDURE — 36415 COLL VENOUS BLD VENIPUNCTURE: CPT | Performed by: EMERGENCY MEDICINE

## 2021-08-05 PROCEDURE — 250N000011 HC RX IP 250 OP 636: Performed by: EMERGENCY MEDICINE

## 2021-08-05 PROCEDURE — 85025 COMPLETE CBC W/AUTO DIFF WBC: CPT | Performed by: EMERGENCY MEDICINE

## 2021-08-05 PROCEDURE — 99285 EMERGENCY DEPT VISIT HI MDM: CPT | Mod: 25 | Performed by: EMERGENCY MEDICINE

## 2021-08-05 RX ORDER — SODIUM CHLORIDE, SODIUM LACTATE, POTASSIUM CHLORIDE, CALCIUM CHLORIDE 600; 310; 30; 20 MG/100ML; MG/100ML; MG/100ML; MG/100ML
1000 INJECTION, SOLUTION INTRAVENOUS CONTINUOUS
Status: DISCONTINUED | OUTPATIENT
Start: 2021-08-05 | End: 2021-08-05 | Stop reason: HOSPADM

## 2021-08-05 RX ORDER — ONDANSETRON 2 MG/ML
8 INJECTION INTRAMUSCULAR; INTRAVENOUS EVERY 8 HOURS PRN
Status: DISCONTINUED | OUTPATIENT
Start: 2021-08-05 | End: 2021-08-05 | Stop reason: HOSPADM

## 2021-08-05 RX ORDER — DIPHENHYDRAMINE HCL 25 MG
25 CAPSULE ORAL EVERY 4 HOURS PRN
Status: DISCONTINUED | OUTPATIENT
Start: 2021-08-05 | End: 2021-08-05 | Stop reason: HOSPADM

## 2021-08-05 RX ORDER — KETOROLAC TROMETHAMINE 15 MG/ML
15 INJECTION, SOLUTION INTRAMUSCULAR; INTRAVENOUS ONCE
Status: DISCONTINUED | OUTPATIENT
Start: 2021-08-05 | End: 2021-08-05 | Stop reason: HOSPADM

## 2021-08-05 RX ORDER — HEPARIN SODIUM (PORCINE) LOCK FLUSH IV SOLN 100 UNIT/ML 100 UNIT/ML
5 SOLUTION INTRAVENOUS ONCE
Status: COMPLETED | OUTPATIENT
Start: 2021-08-05 | End: 2021-08-05

## 2021-08-05 RX ORDER — MORPHINE SULFATE 4 MG/ML
2 INJECTION, SOLUTION INTRAMUSCULAR; INTRAVENOUS
Status: COMPLETED | OUTPATIENT
Start: 2021-08-05 | End: 2021-08-05

## 2021-08-05 RX ADMIN — Medication 5 ML: at 03:53

## 2021-08-05 RX ADMIN — MORPHINE SULFATE 2 MG: 4 INJECTION INTRAVENOUS at 02:14

## 2021-08-05 RX ADMIN — MORPHINE SULFATE 2 MG: 4 INJECTION INTRAVENOUS at 01:03

## 2021-08-05 RX ADMIN — MORPHINE SULFATE 2 MG: 4 INJECTION INTRAVENOUS at 03:32

## 2021-08-05 RX ADMIN — SODIUM CHLORIDE, POTASSIUM CHLORIDE, SODIUM LACTATE AND CALCIUM CHLORIDE 1000 ML: 600; 310; 30; 20 INJECTION, SOLUTION INTRAVENOUS at 01:03

## 2021-08-05 NOTE — ED PROVIDER NOTES
ED Provider Note  [facility]      History     Chief Complaint   Patient presents with     Sickle Cell Pain Crisis     HPI  Jennifer Cervantes is a 21 year old female with a medical history significant for sickle cell disease, previous CVA and migraines who presents to the Emergency Department for evaluation of diffuse back pain that feels consistent with a sickle cell pain crisis.  Patient reports that the pain is throughout her entire back.  She denies any radiation of the pain into her lower extremities.  She denies any numbness, tingling or weakness in her lower extremities.  She denies any loss of bowel or bladder control and denies any saddle anesthesia.  She denies any difficulty walking.  She denies any fevers, chest pain, shortness of breath or cough.  She denies any recent falls or injuries to her back.  Patient reports that she tried taking OxyContin, oxycodone and Tylenol at home, but did not have significant relief of her pain.  Patient presents now for evaluation and management.    Past Medical History  Past Medical History:   Diagnosis Date     Anxiety      Bleeding disorder (H)      Blood clotting disorder (H)      Cerebral infarction (H) 2015     Cognitive developmental delay     low IQ. 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 (H)     right hand contractures     Iron overload due to repeated red blood cell transfusions      Migraines      Multiple subsegmental pulmonary emboli without acute cor pulmonale (H) 02/01/2021     Oppositional defiant behavior      Superficial venous thrombosis of arm, right 03/25/2021     Uncomplicated asthma      Past Surgical History:   Procedure Laterality Date     AS INSERT TUNNELED CV 2 CATH W/O PORT/PUMP       C BREAST REDUCTION (INCLUDES LIPO) TIER 3 Bilateral 04/23/2019     CHOLECYSTECTOMY       INSERT PORT  VASCULAR ACCESS Left 4/21/2021    Procedure: INSERTION, VASCULAR ACCESS PORT (NOT SURE ON SIDE UNTIL REMOVAL);  Surgeon: Rajan More MD;  Location: [facility] OR     IR CHEST PORT PLACEMENT > 5 YRS OF AGE  4/21/2021     IR CVC NON TUNNEL LINE REMOVAL  5/6/2021     IR CVC NON TUNNEL PLACEMENT  04/07/2020     IR CVC NON TUNNEL PLACEMENT  4/30/2021     IR PORT REMOVAL LEFT  4/21/2021     REMOVE PORT VASCULAR ACCESS Left 4/21/2021    Procedure: REMOVAL, VASCULAR ACCESS PORT LEFT;  Surgeon: Rajan More MD;  Location: [facility] OR     REPAIR TENDON ELBOW Right 10/02/2019    Procedure: Right Elbow Flexor Lengthening, Flexor Pronator Slide Of Wrist and Finger, Thumb Adductor Lengthening;  Surgeon: Anai Franco MD;  [facility]     TONSILLECTOMY Bilateral 10/02/2019    Procedure: Bilateral Tonsillectomy;  Surgeon: Farhana Guy MD;  [facility]     acetaminophen (TYLENOL) 325 MG tablet  albuterol (PROAIR HFA/PROVENTIL HFA/VENTOLIN HFA) 108 (90 Base) MCG/ACT inhaler  albuterol (PROVENTIL) (2.5 MG/3ML) 0.083% neb solution  ARIPiprazole (ABILIFY) 2 MG tablet  aspirin (ASA) 81 MG chewable tablet  budesonide-formoterol (SYMBICORT) 160-4.5 MCG/ACT Inhaler  dabigatran ANTICOAGULANT (PRADAXA) 150 MG capsule  diclofenac (VOLTAREN) 1 % topical gel  diphenhydrAMINE (BENADRYL) 25 MG capsule  EPINEPHrine (ANY BX GENERIC EQUIV) 0.3 MG/0.3ML injection 2-pack  Hydroxyurea 1000 MG TABS  hydrOXYzine (ATARAX) 25 MG tablet  JADENU 360 MG tablet  lidocaine-prilocaine (EMLA) 2.5-2.5 % external cream  medroxyPROGESTERone (DEPO-PROVERA) 150 MG/ML IM injection  naloxone (NARCAN) 4 MG/0.1ML nasal spray  ondansetron (ZOFRAN) 8 MG tablet  oxyCODONE (OXYCONTIN) 10 MG 12 hr tablet  oxyCODONE IR (ROXICODONE) 15 MG tablet  sertraline (ZOLOFT) 100 MG tablet      Allergies   Allergen Reactions     Contrast Dye      Hives and breathing issues     Fish-Derived Products Hives     Seafood Hives     Diagnostic X-Ray Materials       Gadolinium      Family History  Family History   Problem Relation Age of Onset     Sickle Cell Trait Mother      Hypertension Mother      Asthma Mother      Sickle Cell Trait Father      Social History   Social History     Tobacco Use     Smoking status: Never Smoker     Smokeless tobacco: Never Used   Substance Use Topics     Alcohol use: Not Currently     Alcohol/week: 0.0 standard drinks     Drug use: Never      Past medical history, past surgical history, medications, allergies, family history, and social history were reviewed with the patient. No additional pertinent items.       Review of Systems  A complete review of systems was performed with pertinent positives and negatives noted in the HPI, and all other systems negative.    Physical Exam   BP: (!) 145/90  Pulse: 110  Temp: 98.9  F (37.2  C)  Resp: 16  SpO2: 93 %  Physical Exam  Physical Exam   Constitutional: oriented to person, place, and time. appears well-developed and well-nourished.   HENT:   Head: Normocephalic and atraumatic.   Neck: Normal range of motion.   Pulmonary/Chest: Effort normal. No respiratory distress.   Cardiac: No murmurs, rubs, gallops. RRR.  Abdominal: Abdomen soft, nontender, nondistended. No rebound tenderness.  No CVA tenderness.  MSK: Long bones without deformity or evidence of trauma.  No tenderness palpation over the thoracic or lumbar spine.  No paraspinous tenderness palpation.  No rashes of his back.  Neurological: alert and oriented to person, place, and time. Sensation is grossly tact of the lower extremities.  No clonus.  Able to lift legs off the bed without difficulty.  Skin: Skin is warm and dry.   Psychiatric:  normal mood and affect.  behavior is normal. Thought content normal.     ED Course      Procedures     12:43 AM  The patient was seen and examined by Andrew Chou MD in Room ED05.               No results found for any visits on 08/05/21.  Medications - No data to display     Assessments & Plan (with  Medical Decision Making)   MDM  Patient here with pain common for her typical pain crisis. No new symptoms. No CP, fevers, sob, cough to suggest acute chest. CBC appears similar to prior. No symptoms of infection. Patient appears quite well without symptoms of infection.    Re eval: Patient was given pain meds per her protocol.  Pain is much improved and she is tolerating p.o. intake.  The patient would like to be discharged home and I feel this is reasonable.  Do not feel further work-up is necessary at this point.  She will follow up with hematology or return if worsening.  She has pain medications at home    I have reviewed the nursing notes. I have reviewed the findings, diagnosis, plan and need for follow up with the patient.    New Prescriptions    No medications on file       Final diagnoses:   Sickle cell pain crisis (H)       --  I, Isaac Walton, am serving as a trained medical scribe to document services personally performed by Andrew Chou MD, based on the provider's statements to me.     IAndrew MD, was physically present and have reviewed and verified the accuracy of this note documented by Isaac Walton.    Andrew Chou MD  [facility] EMERGENCY DEPARTMENT  8/5/2021     Andrew Chou MD  08/05/21 0348

## 2021-08-05 NOTE — DISCHARGE INSTRUCTIONS
Please follow-up with your hematologist as soon as he can    Continue take your medications at home as prescribed    Return to the emergency department if you have any further concerns

## 2021-08-05 NOTE — ED NOTES
Pt c/o sickle pain mostly located in her lower back. Pt states she took oxycodone and tylenol at home with no pain relief.

## 2021-08-06 ENCOUNTER — HOSPITAL ENCOUNTER (EMERGENCY)
Facility: CLINIC | Age: 21
Discharge: HOME | End: 2021-08-06
Attending: EMERGENCY MEDICINE | Admitting: EMERGENCY MEDICINE
Payer: COMMERCIAL

## 2021-08-06 ENCOUNTER — HOME INFUSION (PRE-WILLOW HOME INFUSION) (OUTPATIENT)
Dept: PHARMACY | Facility: CLINIC | Age: 21
End: 2021-08-06

## 2021-08-06 VITALS
HEART RATE: 103 BPM | DIASTOLIC BLOOD PRESSURE: 78 MMHG | TEMPERATURE: 98.1 F | HEIGHT: 64 IN | BODY MASS INDEX: 28.44 KG/M2 | OXYGEN SATURATION: 94 % | SYSTOLIC BLOOD PRESSURE: 147 MMHG | RESPIRATION RATE: 16 BRPM

## 2021-08-06 DIAGNOSIS — M54.50 ACUTE BILATERAL LOW BACK PAIN WITHOUT SCIATICA: ICD-10-CM

## 2021-08-06 DIAGNOSIS — D57.00 SICKLE CELL PAIN CRISIS (H): ICD-10-CM

## 2021-08-06 LAB
ALBUMIN SERPL-MCNC: 3.7 G/DL (ref 3.4–5)
ALBUMIN UR-MCNC: NEGATIVE MG/DL
ALP SERPL-CCNC: 63 U/L (ref 40–150)
ALT SERPL W P-5'-P-CCNC: 65 U/L (ref 0–50)
ANION GAP SERPL CALCULATED.3IONS-SCNC: 2 MMOL/L (ref 3–14)
APPEARANCE UR: CLEAR
AST SERPL W P-5'-P-CCNC: 64 U/L (ref 0–45)
BACTERIA #/AREA URNS HPF: ABNORMAL /HPF
BASOPHILS # BLD AUTO: 0.2 10E3/UL (ref 0–0.2)
BASOPHILS NFR BLD AUTO: 2 %
BILIRUB SERPL-MCNC: 2.5 MG/DL (ref 0.2–1.3)
BILIRUB UR QL STRIP: NEGATIVE
BUN SERPL-MCNC: 7 MG/DL (ref 7–30)
CALCIUM SERPL-MCNC: 8.4 MG/DL (ref 8.5–10.1)
CHLORIDE BLD-SCNC: 112 MMOL/L (ref 94–109)
CO2 SERPL-SCNC: 26 MMOL/L (ref 20–32)
COLOR UR AUTO: ABNORMAL
CREAT SERPL-MCNC: 0.45 MG/DL (ref 0.52–1.04)
EOSINOPHIL # BLD AUTO: 1.2 10E3/UL (ref 0–0.7)
EOSINOPHIL NFR BLD AUTO: 10 %
ERYTHROCYTE [DISTWIDTH] IN BLOOD BY AUTOMATED COUNT: 23 % (ref 10–15)
GFR SERPL CREATININE-BSD FRML MDRD: >90 ML/MIN/1.73M2
GLUCOSE BLD-MCNC: 98 MG/DL (ref 70–99)
GLUCOSE UR STRIP-MCNC: NEGATIVE MG/DL
HCG UR QL: NEGATIVE
HCT VFR BLD AUTO: 21.8 % (ref 35–47)
HGB BLD-MCNC: 7.7 G/DL (ref 11.7–15.7)
HGB UR QL STRIP: NEGATIVE
IMM GRANULOCYTES # BLD: 0.1 10E3/UL
IMM GRANULOCYTES NFR BLD: 1 %
KETONES UR STRIP-MCNC: NEGATIVE MG/DL
LACTATE SERPL-SCNC: 0.9 MMOL/L (ref 0.7–2)
LEUKOCYTE ESTERASE UR QL STRIP: NEGATIVE
LYMPHOCYTES # BLD AUTO: 3.4 10E3/UL (ref 0.8–5.3)
LYMPHOCYTES NFR BLD AUTO: 27 %
MCH RBC QN AUTO: 34.1 PG (ref 26.5–33)
MCHC RBC AUTO-ENTMCNC: 35.3 G/DL (ref 31.5–36.5)
MCV RBC AUTO: 97 FL (ref 78–100)
MONOCYTES # BLD AUTO: 1.2 10E3/UL (ref 0–1.3)
MONOCYTES NFR BLD AUTO: 10 %
NEUTROPHILS # BLD AUTO: 6.3 10E3/UL (ref 1.6–8.3)
NEUTROPHILS NFR BLD AUTO: 50 %
NITRATE UR QL: NEGATIVE
NRBC # BLD AUTO: 0.5 10E3/UL
NRBC BLD AUTO-RTO: 4 /100
PH UR STRIP: 6.5 [PH] (ref 5–7)
PLATELET # BLD AUTO: 318 10E3/UL (ref 150–450)
POTASSIUM BLD-SCNC: 3.6 MMOL/L (ref 3.4–5.3)
PROT SERPL-MCNC: 7.4 G/DL (ref 6.8–8.8)
RBC # BLD AUTO: 2.26 10E6/UL (ref 3.8–5.2)
RBC URINE: <1 /HPF
SODIUM SERPL-SCNC: 140 MMOL/L (ref 133–144)
SP GR UR STRIP: 1 (ref 1–1.03)
SQUAMOUS EPITHELIAL: 1 /HPF
UROBILINOGEN UR STRIP-MCNC: NORMAL MG/DL
WBC # BLD AUTO: 12.4 10E3/UL (ref 4–11)
WBC URINE: 1 /HPF

## 2021-08-06 PROCEDURE — 80053 COMPREHEN METABOLIC PANEL: CPT | Performed by: EMERGENCY MEDICINE

## 2021-08-06 PROCEDURE — 96374 THER/PROPH/DIAG INJ IV PUSH: CPT | Performed by: EMERGENCY MEDICINE

## 2021-08-06 PROCEDURE — 250N000011 HC RX IP 250 OP 636: Performed by: EMERGENCY MEDICINE

## 2021-08-06 PROCEDURE — 99285 EMERGENCY DEPT VISIT HI MDM: CPT | Performed by: EMERGENCY MEDICINE

## 2021-08-06 PROCEDURE — 99285 EMERGENCY DEPT VISIT HI MDM: CPT | Mod: 25 | Performed by: EMERGENCY MEDICINE

## 2021-08-06 PROCEDURE — 96376 TX/PRO/DX INJ SAME DRUG ADON: CPT | Performed by: EMERGENCY MEDICINE

## 2021-08-06 PROCEDURE — 81025 URINE PREGNANCY TEST: CPT | Performed by: EMERGENCY MEDICINE

## 2021-08-06 PROCEDURE — 96361 HYDRATE IV INFUSION ADD-ON: CPT | Performed by: EMERGENCY MEDICINE

## 2021-08-06 PROCEDURE — 85041 AUTOMATED RBC COUNT: CPT | Performed by: EMERGENCY MEDICINE

## 2021-08-06 PROCEDURE — 36415 COLL VENOUS BLD VENIPUNCTURE: CPT | Performed by: EMERGENCY MEDICINE

## 2021-08-06 PROCEDURE — 258N000003 HC RX IP 258 OP 636: Performed by: EMERGENCY MEDICINE

## 2021-08-06 PROCEDURE — 81003 URINALYSIS AUTO W/O SCOPE: CPT | Performed by: EMERGENCY MEDICINE

## 2021-08-06 PROCEDURE — 83605 ASSAY OF LACTIC ACID: CPT | Performed by: EMERGENCY MEDICINE

## 2021-08-06 RX ORDER — MORPHINE SULFATE 4 MG/ML
2 INJECTION, SOLUTION INTRAMUSCULAR; INTRAVENOUS
Status: COMPLETED | OUTPATIENT
Start: 2021-08-06 | End: 2021-08-06

## 2021-08-06 RX ORDER — KETOROLAC TROMETHAMINE 15 MG/ML
15 INJECTION, SOLUTION INTRAMUSCULAR; INTRAVENOUS
Status: DISCONTINUED | OUTPATIENT
Start: 2021-08-06 | End: 2021-08-06 | Stop reason: HOSPADM

## 2021-08-06 RX ORDER — SODIUM CHLORIDE 9 MG/ML
INJECTION, SOLUTION INTRAVENOUS ONCE
Status: COMPLETED | OUTPATIENT
Start: 2021-08-06 | End: 2021-08-06

## 2021-08-06 RX ORDER — HEPARIN SODIUM (PORCINE) LOCK FLUSH IV SOLN 100 UNIT/ML 100 UNIT/ML
5 SOLUTION INTRAVENOUS
Status: DISCONTINUED | OUTPATIENT
Start: 2021-08-06 | End: 2021-08-06 | Stop reason: HOSPADM

## 2021-08-06 RX ORDER — ONDANSETRON 2 MG/ML
8 INJECTION INTRAMUSCULAR; INTRAVENOUS
Status: DISCONTINUED | OUTPATIENT
Start: 2021-08-06 | End: 2021-08-06 | Stop reason: HOSPADM

## 2021-08-06 RX ORDER — OXYCODONE HYDROCHLORIDE 15 MG/1
TABLET ORAL
Qty: 60 TABLET | Refills: 0 | Status: SHIPPED | OUTPATIENT
Start: 2021-08-06 | End: 2021-08-18

## 2021-08-06 RX ORDER — DIPHENHYDRAMINE HCL 25 MG
25 CAPSULE ORAL
Status: DISCONTINUED | OUTPATIENT
Start: 2021-08-06 | End: 2021-08-06 | Stop reason: HOSPADM

## 2021-08-06 RX ADMIN — MORPHINE SULFATE 2 MG: 4 INJECTION INTRAVENOUS at 01:53

## 2021-08-06 RX ADMIN — SODIUM CHLORIDE: 9 INJECTION, SOLUTION INTRAVENOUS at 02:03

## 2021-08-06 RX ADMIN — MORPHINE SULFATE 2 MG: 4 INJECTION INTRAVENOUS at 03:53

## 2021-08-06 RX ADMIN — MORPHINE SULFATE 2 MG: 4 INJECTION INTRAVENOUS at 02:54

## 2021-08-06 RX ADMIN — HEPARIN 5 ML: 100 SYRINGE at 06:01

## 2021-08-06 NOTE — ED TRIAGE NOTES
Pt presents from home ambulatory for sickle cell pain crisis that started around 2200 last night, pt c/o lower back pain. Pt took scheduled pain meds at home with no relief.

## 2021-08-06 NOTE — TELEPHONE ENCOUNTER
Refill Request    Date of most recent appointment:  8/2/21 Dr Duncan   Next upcoming appointment:   8/16/21 DR Duncan     Medication requested:  Oxycodone IR 15 mg   Quantity:  60  Last fill date:  7/9/2021  Person requesting refill:  Jennifer  Notes:  States medication is effective in treating sickle cell pain. Takes 1 tab every 6 hours. Denies side effects from the medication.

## 2021-08-06 NOTE — ED PROVIDER NOTES
History     Chief Complaint   Patient presents with     Sickle Cell Pain Crisis     lower back pain      HPI  Jennifer Cervantes is a 21 year old female with PMH notable for sickle cell anemia with frequent ED presentations, PE, asthma who presents to the ED with low back pain. Pain worsened tonight, has been present a few days. No radiation, constant, aching quality, no clear aggravating/alleviating factors. No loss of continence. no trauma. No chest pain. No SOB. Home medications not providing sufficient relief. She is also having some pain in the bilateral hips, more mild than the back.     Past Medical History  Past Medical History:   Diagnosis Date     Anxiety      Bleeding disorder (H)      Blood clotting disorder (H)      Cerebral infarction (H) 2015     Cognitive developmental delay     low IQ. 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 (H)     right hand contractures     Iron overload due to repeated red blood cell transfusions      Migraines      Multiple subsegmental pulmonary emboli without acute cor pulmonale (H) 02/01/2021     Oppositional defiant behavior      Superficial venous thrombosis of arm, right 03/25/2021     Uncomplicated asthma      Past Surgical History:   Procedure Laterality Date     AS INSERT TUNNELED CV 2 CATH W/O PORT/PUMP       C BREAST REDUCTION (INCLUDES LIPO) TIER 3 Bilateral 04/23/2019     CHOLECYSTECTOMY       INSERT PORT VASCULAR ACCESS Left 4/21/2021    Procedure: INSERTION, VASCULAR ACCESS PORT (NOT SURE ON SIDE UNTIL REMOVAL);  Surgeon: Rajan More MD;  Location: [facility] OR     IR CHEST PORT PLACEMENT > 5 YRS OF AGE  4/21/2021     IR CVC NON TUNNEL LINE REMOVAL  5/6/2021     IR CVC NON TUNNEL PLACEMENT  04/07/2020     IR CVC NON TUNNEL PLACEMENT  4/30/2021     IR PORT REMOVAL LEFT  4/21/2021     REMOVE PORT VASCULAR ACCESS Left 4/21/2021     Procedure: REMOVAL, VASCULAR ACCESS PORT LEFT;  Surgeon: Rajan More MD;  Location: [facility] OR     REPAIR TENDON ELBOW Right 10/02/2019    Procedure: Right Elbow Flexor Lengthening, Flexor Pronator Slide Of Wrist and Finger, Thumb Adductor Lengthening;  Surgeon: Anai Franco MD;  [facility]: [facility]     TONSILLECTOMY Bilateral 10/02/2019    Procedure: Bilateral Tonsillectomy;  Surgeon: Farhana uGy MD;  Location: [facility]     oxyCODONE IR (ROXICODONE) 15 MG tablet  acetaminophen (TYLENOL) 325 MG tablet  albuterol (PROAIR HFA/PROVENTIL HFA/VENTOLIN HFA) 108 (90 Base) MCG/ACT inhaler  albuterol (PROVENTIL) (2.5 MG/3ML) 0.083% neb solution  ARIPiprazole (ABILIFY) 2 MG tablet  aspirin (ASA) 81 MG chewable tablet  budesonide-formoterol (SYMBICORT) 160-4.5 MCG/ACT Inhaler  dabigatran ANTICOAGULANT (PRADAXA) 150 MG capsule  diclofenac (VOLTAREN) 1 % topical gel  diphenhydrAMINE (BENADRYL) 25 MG capsule  EPINEPHrine (ANY BX GENERIC EQUIV) 0.3 MG/0.3ML injection 2-pack  Hydroxyurea 1000 MG TABS  hydrOXYzine (ATARAX) 25 MG tablet  JADENU 360 MG tablet  lidocaine-prilocaine (EMLA) 2.5-2.5 % external cream  medroxyPROGESTERone (DEPO-PROVERA) 150 MG/ML IM injection  naloxone (NARCAN) 4 MG/0.1ML nasal spray  ondansetron (ZOFRAN) 8 MG tablet  oxyCODONE (OXYCONTIN) 10 MG 12 hr tablet  sertraline (ZOLOFT) 100 MG tablet      Allergies   Allergen Reactions     Contrast Dye      Hives and breathing issues     Fish-Derived Products Hives     Seafood Hives     Diagnostic X-Ray Materials      Gadolinium      Social History   Social History     Tobacco Use     Smoking status: Never Smoker     Smokeless tobacco: Never Used   Substance Use Topics     Alcohol use: Not Currently     Alcohol/week: 0.0 standard drinks     Drug use: Never      Past medical history and social history were reviewed with the patient. Additional pertinent items: None     Review of Systems  A complete review of systems was performed with pertinent  "positives and negatives noted in the HPI, and all other systems negative.    Physical Exam   BP: 112/68  Pulse: 101  Temp: 98.1  F (36.7  C)  Resp: 16  Height: 162.6 cm (5' 4\")  SpO2: 97 %    Physical Exam  General: no acute distress. Appears stated age. Somewhat uncomfortable appearing.   HENT: MMM, no oropharyngeal lesions  Eyes: PERRL, normal sclerae  Cardio: borderline tachycardic rate. Regular rhythm. Extremities well perfused  Resp: Normal work of breathing, normal respiratory rate.  Chest/Back: no visual signs of trauma, no CVA tenderness. No midline tenderness. There is bilateral paraspinal lumbar tenderness present.   Abdomen: no tenderness, non-distended, no rebound, no guarding  Neuro: alert and fully oriented. CN II-XII grossly intact. Grossly normal strength and sensation in all extremities.   MSK: no deformities. Grossly normal ROM in extremities. Bilateral hips with mild tenderness with palpation over the greater trochanters, minimal pain with ROM, not overly warm, no erythema.   Integumentary/Skin: no rash visualized, normal color  Psych: normal affect, normal behavior    ED Course      Procedures            Labs Ordered and Resulted from Time of ED Arrival Up to the Time of Departure from the ED   COMPREHENSIVE METABOLIC PANEL - Abnormal; Notable for the following components:       Result Value    Chloride 112 (*)     Anion Gap 2 (*)     Creatinine 0.45 (*)     Calcium 8.4 (*)     AST 64 (*)     ALT 65 (*)     Bilirubin Total 2.5 (*)     All other components within normal limits   ROUTINE UA WITH MICROSCOPIC REFLEX TO CULTURE - Abnormal; Notable for the following components:    Bacteria Urine Few (*)     All other components within normal limits    Narrative:     Urine Culture not indicated   CBC WITH PLATELETS AND DIFFERENTIAL - Abnormal; Notable for the following components:    WBC Count 12.4 (*)     RBC Count 2.26 (*)     Hemoglobin 7.7 (*)     Hematocrit 21.8 (*)     MCH 34.1 (*)     RDW 23.0 " (*)     NRBCs per 100 WBC 4 (*)     Absolute Eosinophils 1.2 (*)     Absolute Immature Granulocytes 0.1 (*)     All other components within normal limits   LACTIC ACID WHOLE BLOOD - Normal   HCG QUALITATIVE URINE - Normal   CBC WITH PLATELETS & DIFFERENTIAL    Narrative:     The following orders were created for panel order CBC with platelets differential.  Procedure                               Abnormality         Status                     ---------                               -----------         ------                     CBC with platelets and d...[[id number]]  Abnormal            Final result                 Please view results for these tests on the individual orders.     No orders to display          Assessments & Plan (with Medical Decision Making)   Patient presenting with pain in the low back and hips similar to past sickle pain flares. Vitals in the ED with initial borderline tachycardia, otherwise unremarkable. Nursing notes reviewed.     UA without evidence of UTI. Electrolytes unremarkable. Hgb stable from previous. Pregnancy negative. No trauma to suggest fracture nor other bony pathology. Exam of the hips was without findings suggestive of joint infection or other acutely dangerous pathology, overall hip pain was relatively mild. Back pain without signs of dangerous pathology such as cauda equina syndrome or epidural abscess. Pain is more lateral than would be expected for spinal pathology.     Pain character similar to past pain flares. In the ED, the patient's symptoms were managed with morphine and ketorolac in line with her established care plan, with improvement in symptoms upon reassessment. Patient then requesting discharge.     After counseling on the diagnosis, work-up, and treatment plan, the patient was discharged to home. The patient was advised to follow-up with hematology within a couple days. The patient was advised to return to the ED if worsening symptoms, or if there are any  urgent/life-threatening concerns.     Final diagnoses:   Sickle cell pain crisis (H)   Acute bilateral low back pain without sciatica     Discharge Medication List as of 8/6/2021  5:51 AM          --  Huang Domingo MD   Emergency Medicine   [facility] EMERGENCY DEPARTMENT  8/6/2021     Huang Domingo MD  08/07/21 4935

## 2021-08-06 NOTE — DISCHARGE INSTRUCTIONS
Instructions from your doctor today:  Emergency Department testing is focused on the potential causes of your symptoms that are the most dangerous possibilities, and cannot cover every possibility. Based on the evaluation, it was deemed sufficiently safe to discharge and continue management through the clinics. Thus, follow-up is very important to assess for improvement/worsening, potential further testing, and potential treatment adjustments. If you were given opioid pain medications or other medications that can make you drowsy while in the ED, you should not drive for at least several hours and not until you feel completely back to normal.     Please make an appointment to follow up with:  - Hematology Oncology Clinic (phone: [phone]) this coming day.   - If you do not have a primary care provider, you can be seen in follow-up and establish care by calling any of the clinics below:     - [facility] (phone: [phone])     - Primary Care / [facility] (phone: [phone])   - Have your clinic provider review the results from today's visit with you again, including any potential follow-up or additional testing that may be needed based on the results. Occasionally, incidental findings are found on later review by radiologists that may need follow-up.     Return to the Emergency Department immediately if you have worsening symptoms, or any other urgent or potentially life-threatening concerns.

## 2021-08-07 ENCOUNTER — APPOINTMENT (OUTPATIENT)
Dept: GENERAL RADIOLOGY | Facility: CLINIC | Age: 21
End: 2021-08-07
Attending: EMERGENCY MEDICINE
Payer: COMMERCIAL

## 2021-08-07 ENCOUNTER — APPOINTMENT (OUTPATIENT)
Dept: NUCLEAR MEDICINE | Facility: CLINIC | Age: 21
End: 2021-08-07
Attending: EMERGENCY MEDICINE
Payer: COMMERCIAL

## 2021-08-07 ENCOUNTER — HOSPITAL ENCOUNTER (EMERGENCY)
Facility: CLINIC | Age: 21
Discharge: HOME | End: 2021-08-07
Attending: EMERGENCY MEDICINE | Admitting: EMERGENCY MEDICINE
Payer: COMMERCIAL

## 2021-08-07 VITALS
DIASTOLIC BLOOD PRESSURE: 52 MMHG | RESPIRATION RATE: 16 BRPM | BODY MASS INDEX: 28.46 KG/M2 | TEMPERATURE: 98 F | SYSTOLIC BLOOD PRESSURE: 114 MMHG | WEIGHT: 165.79 LBS | OXYGEN SATURATION: 96 % | HEART RATE: 86 BPM

## 2021-08-07 DIAGNOSIS — D57.00 SICKLE CELL PAIN CRISIS (H): ICD-10-CM

## 2021-08-07 LAB
ALBUMIN SERPL-MCNC: 3.7 G/DL (ref 3.4–5)
ALP SERPL-CCNC: 72 U/L (ref 40–150)
ALT SERPL W P-5'-P-CCNC: 66 U/L (ref 0–50)
ANION GAP SERPL CALCULATED.3IONS-SCNC: 6 MMOL/L (ref 3–14)
AST SERPL W P-5'-P-CCNC: 70 U/L (ref 0–45)
ATRIAL RATE - MUSE: 104 BPM
BASOPHILS # BLD AUTO: 0.3 10E3/UL (ref 0–0.2)
BASOPHILS NFR BLD AUTO: 2 %
BILIRUB SERPL-MCNC: 2.4 MG/DL (ref 0.2–1.3)
BUN SERPL-MCNC: 6 MG/DL (ref 7–30)
CALCIUM SERPL-MCNC: 8.3 MG/DL (ref 8.5–10.1)
CHLORIDE BLD-SCNC: 112 MMOL/L (ref 94–109)
CO2 SERPL-SCNC: 22 MMOL/L (ref 20–32)
CREAT SERPL-MCNC: 0.48 MG/DL (ref 0.52–1.04)
D DIMER PPP FEU-MCNC: 2.08 UG/ML FEU (ref 0–0.5)
DIASTOLIC BLOOD PRESSURE - MUSE: NORMAL MMHG
EOSINOPHIL # BLD AUTO: 1.4 10E3/UL (ref 0–0.7)
EOSINOPHIL NFR BLD AUTO: 12 %
ERYTHROCYTE [DISTWIDTH] IN BLOOD BY AUTOMATED COUNT: 23.8 % (ref 10–15)
GFR SERPL CREATININE-BSD FRML MDRD: >90 ML/MIN/1.73M2
GLUCOSE BLD-MCNC: 94 MG/DL (ref 70–99)
HCG UR QL: NEGATIVE
HCT VFR BLD AUTO: 23 % (ref 35–47)
HGB BLD-MCNC: 7.9 G/DL (ref 11.7–15.7)
HOLD SPECIMEN: NORMAL
HOLD SPECIMEN: NORMAL
IMM GRANULOCYTES # BLD: 0 10E3/UL
IMM GRANULOCYTES NFR BLD: 0 %
INTERNAL QC OK POCT: NORMAL
INTERPRETATION ECG - MUSE: NORMAL
LYMPHOCYTES # BLD AUTO: 3 10E3/UL (ref 0.8–5.3)
LYMPHOCYTES NFR BLD AUTO: 27 %
MCH RBC QN AUTO: 34.1 PG (ref 26.5–33)
MCHC RBC AUTO-ENTMCNC: 34.3 G/DL (ref 31.5–36.5)
MCV RBC AUTO: 99 FL (ref 78–100)
MONOCYTES # BLD AUTO: 1.1 10E3/UL (ref 0–1.3)
MONOCYTES NFR BLD AUTO: 10 %
NEUTROPHILS # BLD AUTO: 5.5 10E3/UL (ref 1.6–8.3)
NEUTROPHILS NFR BLD AUTO: 49 %
NRBC # BLD AUTO: 0.5 10E3/UL
NRBC BLD AUTO-RTO: 4 /100
P AXIS - MUSE: 66 DEGREES
PLATELET # BLD AUTO: 320 10E3/UL (ref 150–450)
POTASSIUM BLD-SCNC: 3.7 MMOL/L (ref 3.4–5.3)
PR INTERVAL - MUSE: 148 MS
PROT SERPL-MCNC: 7.6 G/DL (ref 6.8–8.8)
QRS DURATION - MUSE: 72 MS
QT - MUSE: 352 MS
QTC - MUSE: 462 MS
R AXIS - MUSE: 40 DEGREES
RADIOLOGIST FLAGS: ABNORMAL
RBC # BLD AUTO: 2.32 10E6/UL (ref 3.8–5.2)
SODIUM SERPL-SCNC: 140 MMOL/L (ref 133–144)
SYSTOLIC BLOOD PRESSURE - MUSE: NORMAL MMHG
T AXIS - MUSE: 23 DEGREES
TROPONIN I SERPL-MCNC: <0.015 UG/L (ref 0–0.04)
VENTRICULAR RATE- MUSE: 104 BPM
WBC # BLD AUTO: 11.3 10E3/UL (ref 4–11)

## 2021-08-07 PROCEDURE — 250N000013 HC RX MED GY IP 250 OP 250 PS 637: Performed by: EMERGENCY MEDICINE

## 2021-08-07 PROCEDURE — 99285 EMERGENCY DEPT VISIT HI MDM: CPT | Mod: 25

## 2021-08-07 PROCEDURE — 99285 EMERGENCY DEPT VISIT HI MDM: CPT | Performed by: EMERGENCY MEDICINE

## 2021-08-07 PROCEDURE — 85025 COMPLETE CBC W/AUTO DIFF WBC: CPT | Performed by: EMERGENCY MEDICINE

## 2021-08-07 PROCEDURE — 71046 X-RAY EXAM CHEST 2 VIEWS: CPT

## 2021-08-07 PROCEDURE — 84484 ASSAY OF TROPONIN QUANT: CPT | Performed by: EMERGENCY MEDICINE

## 2021-08-07 PROCEDURE — 71046 X-RAY EXAM CHEST 2 VIEWS: CPT | Mod: 26 | Performed by: RADIOLOGY

## 2021-08-07 PROCEDURE — 96361 HYDRATE IV INFUSION ADD-ON: CPT

## 2021-08-07 PROCEDURE — 78580 LUNG PERFUSION IMAGING: CPT | Mod: 26 | Performed by: RADIOLOGY

## 2021-08-07 PROCEDURE — 258N000003 HC RX IP 258 OP 636: Performed by: EMERGENCY MEDICINE

## 2021-08-07 PROCEDURE — 82040 ASSAY OF SERUM ALBUMIN: CPT | Performed by: EMERGENCY MEDICINE

## 2021-08-07 PROCEDURE — 36415 COLL VENOUS BLD VENIPUNCTURE: CPT | Performed by: EMERGENCY MEDICINE

## 2021-08-07 PROCEDURE — 93005 ELECTROCARDIOGRAM TRACING: CPT

## 2021-08-07 PROCEDURE — 96374 THER/PROPH/DIAG INJ IV PUSH: CPT | Mod: 59

## 2021-08-07 PROCEDURE — 250N000011 HC RX IP 250 OP 636: Performed by: EMERGENCY MEDICINE

## 2021-08-07 PROCEDURE — 78580 LUNG PERFUSION IMAGING: CPT

## 2021-08-07 PROCEDURE — 85379 FIBRIN DEGRADATION QUANT: CPT | Performed by: EMERGENCY MEDICINE

## 2021-08-07 PROCEDURE — A9540 TC99M MAA: HCPCS

## 2021-08-07 PROCEDURE — 343N000001 HC RX 343

## 2021-08-07 PROCEDURE — 96375 TX/PRO/DX INJ NEW DRUG ADDON: CPT | Mod: 59

## 2021-08-07 PROCEDURE — 81025 URINE PREGNANCY TEST: CPT | Performed by: EMERGENCY MEDICINE

## 2021-08-07 PROCEDURE — 999N000127 HC STATISTIC PERIPHERAL IV START W US GUIDANCE

## 2021-08-07 PROCEDURE — 96376 TX/PRO/DX INJ SAME DRUG ADON: CPT

## 2021-08-07 RX ORDER — SODIUM CHLORIDE, SODIUM LACTATE, POTASSIUM CHLORIDE, CALCIUM CHLORIDE 600; 310; 30; 20 MG/100ML; MG/100ML; MG/100ML; MG/100ML
1000 INJECTION, SOLUTION INTRAVENOUS CONTINUOUS
Status: DISCONTINUED | OUTPATIENT
Start: 2021-08-07 | End: 2021-08-07 | Stop reason: HOSPADM

## 2021-08-07 RX ORDER — HEPARIN SODIUM (PORCINE) LOCK FLUSH IV SOLN 100 UNIT/ML 100 UNIT/ML
100 SOLUTION INTRAVENOUS ONCE
Status: DISCONTINUED | OUTPATIENT
Start: 2021-08-07 | End: 2021-08-07

## 2021-08-07 RX ORDER — KETOROLAC TROMETHAMINE 15 MG/ML
15 INJECTION, SOLUTION INTRAMUSCULAR; INTRAVENOUS ONCE
Status: COMPLETED | OUTPATIENT
Start: 2021-08-07 | End: 2021-08-07

## 2021-08-07 RX ORDER — MORPHINE SULFATE 4 MG/ML
2 INJECTION, SOLUTION INTRAMUSCULAR; INTRAVENOUS
Status: COMPLETED | OUTPATIENT
Start: 2021-08-07 | End: 2021-08-07

## 2021-08-07 RX ORDER — LORAZEPAM 0.5 MG/1
1 TABLET ORAL
Status: COMPLETED | OUTPATIENT
Start: 2021-08-07 | End: 2021-08-07

## 2021-08-07 RX ORDER — HEPARIN SODIUM (PORCINE) LOCK FLUSH IV SOLN 100 UNIT/ML 100 UNIT/ML
500 SOLUTION INTRAVENOUS ONCE
Status: DISCONTINUED | OUTPATIENT
Start: 2021-08-07 | End: 2021-08-07 | Stop reason: HOSPADM

## 2021-08-07 RX ADMIN — MORPHINE SULFATE 2 MG: 4 INJECTION, SOLUTION INTRAMUSCULAR; INTRAVENOUS at 01:33

## 2021-08-07 RX ADMIN — KIT FOR THE PREPARATION OF TECHNETIUM TC 99M ALBUMIN AGGREGATED 7 MCI.: 2.5 INJECTION, POWDER, FOR SOLUTION INTRAVENOUS at 12:43

## 2021-08-07 RX ADMIN — SODIUM CHLORIDE, POTASSIUM CHLORIDE, SODIUM LACTATE AND CALCIUM CHLORIDE 1000 ML: 600; 310; 30; 20 INJECTION, SOLUTION INTRAVENOUS at 01:33

## 2021-08-07 RX ADMIN — MORPHINE SULFATE 2 MG: 4 INJECTION, SOLUTION INTRAMUSCULAR; INTRAVENOUS at 02:42

## 2021-08-07 RX ADMIN — LORAZEPAM 1 MG: 0.5 TABLET ORAL at 10:53

## 2021-08-07 RX ADMIN — KETOROLAC TROMETHAMINE 15 MG: 15 INJECTION, SOLUTION INTRAMUSCULAR; INTRAVENOUS at 01:33

## 2021-08-07 RX ADMIN — MORPHINE SULFATE 2 MG: 4 INJECTION, SOLUTION INTRAMUSCULAR; INTRAVENOUS at 03:59

## 2021-08-07 RX ADMIN — SODIUM CHLORIDE, POTASSIUM CHLORIDE, SODIUM LACTATE AND CALCIUM CHLORIDE 1000 ML: 600; 310; 30; 20 INJECTION, SOLUTION INTRAVENOUS at 02:42

## 2021-08-07 NOTE — DISCHARGE INSTRUCTIONS
Please follow-up with your hematologist as soon as you can    Return to the emergency department if any further concerns    Continue to take your medications as prescribed

## 2021-08-07 NOTE — ED PROVIDER NOTES
ED Provider Note  [facility]      History     Chief Complaint   Patient presents with     Sickle Cell Pain Crisis     Chest Pain     HPI  Jennifer Cervantes is a 21 year old female with a medical history significant for sickle cell disease, previous CVA and migraines who presents to the Emergency Department for evaluation of bilateral hip pain that feels consistent with a sickle cell pain crisis as well as intermittent chest pain.  Patient reports that she has had more sickle cell pain today and her home pain medications have not been helping.  She notes that today she has also been having intermittent, substernal, sharp chest pain.  She denies any shortness of breath and denies any worsening of the chest pain with taking deep breaths.  She does note that she has had PEs in the past, but she does not think she has one today as her O2 saturations are normal.  Patient denies any recent falls or traumas.  She denies any numbness, tingling or weakness and denies any vision changes.  Patient denies any recent leg swelling, leg pain, fevers or cough.    Past Medical History  Past Medical History:   Diagnosis Date     Anxiety      Bleeding disorder (H)      Blood clotting disorder (H)      Cerebral infarction (H) 2015     Cognitive developmental delay     low IQ. 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 (H)     right hand contractures     Iron overload due to repeated red blood cell transfusions      Migraines      Multiple subsegmental pulmonary emboli without acute cor pulmonale (H) 02/01/2021     Oppositional defiant behavior      Superficial venous thrombosis of arm, right 03/25/2021     Uncomplicated asthma      Past Surgical History:   Procedure Laterality Date     AS INSERT TUNNELED CV 2 CATH W/O PORT/PUMP       C BREAST REDUCTION (INCLUDES LIPO) TIER 3 Bilateral  04/23/2019     CHOLECYSTECTOMY       INSERT PORT VASCULAR ACCESS Left 4/21/2021    Procedure: INSERTION, VASCULAR ACCESS PORT (NOT SURE ON SIDE UNTIL REMOVAL);  Surgeon: Rajan More MD;  Location: [facility] OR     IR CHEST PORT PLACEMENT > 5 YRS OF AGE  4/21/2021     IR CVC NON TUNNEL LINE REMOVAL  5/6/2021     IR CVC NON TUNNEL PLACEMENT  04/07/2020     IR CVC NON TUNNEL PLACEMENT  4/30/2021     IR PORT REMOVAL LEFT  4/21/2021     REMOVE PORT VASCULAR ACCESS Left 4/21/2021    Procedure: REMOVAL, VASCULAR ACCESS PORT LEFT;  Surgeon: Rajan More MD;  Location: [facility] OR     REPAIR TENDON ELBOW Right 10/02/2019    Procedure: Right Elbow Flexor Lengthening, Flexor Pronator Slide Of Wrist and Finger, Thumb Adductor Lengthening;  Surgeon: Anai Franco MD;  [facility]     TONSILLECTOMY Bilateral 10/02/2019    Procedure: Bilateral Tonsillectomy;  Surgeon: Farhana Guy MD;  [facility]: [facility]     oxyCODONE IR (ROXICODONE) 15 MG tablet  acetaminophen (TYLENOL) 325 MG tablet  albuterol (PROAIR HFA/PROVENTIL HFA/VENTOLIN HFA) 108 (90 Base) MCG/ACT inhaler  albuterol (PROVENTIL) (2.5 MG/3ML) 0.083% neb solution  ARIPiprazole (ABILIFY) 2 MG tablet  aspirin (ASA) 81 MG chewable tablet  budesonide-formoterol (SYMBICORT) 160-4.5 MCG/ACT Inhaler  dabigatran ANTICOAGULANT (PRADAXA) 150 MG capsule  diclofenac (VOLTAREN) 1 % topical gel  diphenhydrAMINE (BENADRYL) 25 MG capsule  EPINEPHrine (ANY BX GENERIC EQUIV) 0.3 MG/0.3ML injection 2-pack  Hydroxyurea 1000 MG TABS  hydrOXYzine (ATARAX) 25 MG tablet  JADENU 360 MG tablet  lidocaine-prilocaine (EMLA) 2.5-2.5 % external cream  medroxyPROGESTERone (DEPO-PROVERA) 150 MG/ML IM injection  naloxone (NARCAN) 4 MG/0.1ML nasal spray  ondansetron (ZOFRAN) 8 MG tablet  oxyCODONE (OXYCONTIN) 10 MG 12 hr tablet  sertraline (ZOLOFT) 100 MG tablet      Allergies   Allergen Reactions     Contrast Dye      Hives and breathing issues     Fish-Derived Products Hives     Seafood  Hives     Diagnostic X-Ray Materials      Gadolinium      Family History  Family History   Problem Relation Age of Onset     Sickle Cell Trait Mother      Hypertension Mother      Asthma Mother      Sickle Cell Trait Father      Social History   Social History     Tobacco Use     Smoking status: Never Smoker     Smokeless tobacco: Never Used   Substance Use Topics     Alcohol use: Not Currently     Alcohol/week: 0.0 standard drinks     Drug use: Never      Past medical history, past surgical history, medications, allergies, family history, and social history were reviewed with the patient. No additional pertinent items.       Review of Systems  A complete review of systems was performed with pertinent positives and negatives noted in the HPI, and all other systems negative.    Physical Exam   BP: 130/86  Pulse: 99  Temp: 98  F (36.7  C)  Resp: 16  SpO2: 95 %  Physical Exam  Physical Exam   Constitutional: oriented to person, place, and time. appears well-developed and well-nourished.   HENT:   Head: Normocephalic and atraumatic.   Neck: Normal range of motion.   Pulmonary/Chest: Effort normal. No respiratory distress.   Cardiac: No murmurs, rubs, gallops. RRR.  Abdominal: Abdomen soft, nontender, nondistended. No rebound tenderness.  MSK: Long bones without deformity or evidence of trauma  Neurological: alert and oriented to person, place, and time.   Skin: Skin is warm and dry.   Psychiatric:  normal mood and affect.  behavior is normal. Thought content normal.     ED Course      Procedures     1:08 AM  The patient was seen and examined by Andrew Chou MD in Room [facility].           Results for orders placed or performed during the hospital encounter of 08/07/21   CBC with platelets differential     Status: Abnormal    Narrative    The following orders were created for panel order CBC with platelets differential.  Procedure                               Abnormality         Status                     ---------                                -----------         ------                     CBC with platelets and d...[[id number]]  Abnormal            Final result                 Please view results for these tests on the individual orders.   Comprehensive metabolic panel     Status: Abnormal (Preliminary result)   Result Value Ref Range    Sodium 140 133 - 144 mmol/L    Potassium 3.7 3.4 - 5.3 mmol/L    Chloride 112 (H) 94 - 109 mmol/L    Carbon Dioxide (CO2)      Anion Gap      Urea Nitrogen      Creatinine      Calcium      Glucose      Alkaline Phosphatase      AST      ALT      Protein Total      Albumin      Bilirubin Total      GFR Estimate     CBC with platelets and differential     Status: Abnormal   Result Value Ref Range    WBC Count 11.3 (H) 4.0 - 11.0 10e3/uL    RBC Count 2.32 (L) 3.80 - 5.20 10e6/uL    Hemoglobin 7.9 (L) 11.7 - 15.7 g/dL    Hematocrit 23.0 (L) 35.0 - 47.0 %    MCV 99 78 - 100 fL    MCH 34.1 (H) 26.5 - 33.0 pg    MCHC 34.3 31.5 - 36.5 g/dL    RDW 23.8 (H) 10.0 - 15.0 %    Platelet Count 320 150 - 450 10e3/uL    % Neutrophils 49 %    % Lymphocytes 27 %    % Monocytes 10 %    % Eosinophils 12 %    % Basophils 2 %    % Immature Granulocytes 0 %    NRBCs per 100 WBC 4 (H) <1 /100    Absolute Neutrophils 5.5 1.6 - 8.3 10e3/uL    Absolute Lymphocytes 3.0 0.8 - 5.3 10e3/uL    Absolute Monocytes 1.1 0.0 - 1.3 10e3/uL    Absolute Eosinophils 1.4 (H) 0.0 - 0.7 10e3/uL    Absolute Basophils 0.3 (H) 0.0 - 0.2 10e3/uL    Absolute Immature Granulocytes 0.0 <=0.0 10e3/uL    Absolute NRBCs 0.5 10e3/uL   EKG 12-lead, tracing only     Status: None (Preliminary result)   Result Value Ref Range    Systolic Blood Pressure  mmHg    Diastolic Blood Pressure  mmHg    Ventricular Rate 104 BPM    Atrial Rate 104 BPM    ND Interval 148 ms    QRS Duration 72 ms     ms    QTc 462 ms    P Axis 66 degrees    R AXIS 40 degrees    T Axis 23 degrees    Interpretation ECG Sinus tachycardia  Otherwise normal ECG      [place] Draw  *Canceled*     Status: None ()    Narrative    The following orders were created for panel order [place] Draw.  Procedure                               Abnormality         Status                     ---------                               -----------         ------                       Please view results for these tests on the individual orders.   [place] Draw     Status: None (In process)    Narrative    The following orders were created for panel order [place] Draw.  Procedure                               Abnormality         Status                     ---------                               -----------         ------                     Extra Blue Top Tube[[id number]]                              In process                 Extra Red Top Tube[[id number]]                               In process                   Please view results for these tests on the individual orders.     Medications - No data to display     Assessments & Plan (with Medical Decision Making)   MDM  Patient presenting with sickle cell pain.  Very unlikely acute chest, no new infiltrates, white blood count improving, no cough, shortness of breath or fevers.  Awaiting VQ scan due to pleuritic pain and elevated D-dimer, she has had clotting despite being on anticoagulation in the past.  She is otherwise stable and appears well.  Hemoglobin stable.  Reticulocyte count yesterday is appropriate elevated.  Patient has complete pain control after morphine x3 per pain plan.  We will get a VQ scan and likely discharge home if negative.  Patient was sent out to the oncoming provider.    I have reviewed the nursing notes. I have reviewed the findings, diagnosis, plan and need for follow up with the patient.    New Prescriptions    No medications on file       Final diagnoses:   Sickle cell pain crisis (H)       --  I, Isaac Walton, am serving as a trained medical scribe to document services personally performed by Andrew Chou MD, based on the  provider's statements to me.     I, Andrew Chou MD, was physically present and have reviewed and verified the accuracy of this note documented by Isaac Walton.    Andrew Chou MD  [facility] EMERGENCY DEPARTMENT  8/7/2021     Andrew Chou MD  08/07/21 0518

## 2021-08-07 NOTE — ED TRIAGE NOTES
Arrives ambulatory to triage c/o sickle cell pain crisis, in hips around bilaterally, as well as chest. Sharp pain, comes and goes. A&Ox4, VSS RA.

## 2021-08-08 ENCOUNTER — HOSPITAL ENCOUNTER (EMERGENCY)
Facility: CLINIC | Age: 21
Discharge: HOME | End: 2021-08-08
Attending: EMERGENCY MEDICINE | Admitting: EMERGENCY MEDICINE
Payer: COMMERCIAL

## 2021-08-08 VITALS
OXYGEN SATURATION: 95 % | DIASTOLIC BLOOD PRESSURE: 96 MMHG | RESPIRATION RATE: 14 BRPM | BODY MASS INDEX: 28.17 KG/M2 | HEIGHT: 64 IN | WEIGHT: 165 LBS | HEART RATE: 101 BPM | SYSTOLIC BLOOD PRESSURE: 138 MMHG | TEMPERATURE: 98.3 F

## 2021-08-08 DIAGNOSIS — D57.00 SICKLE CELL PAIN CRISIS (H): ICD-10-CM

## 2021-08-08 LAB
ALBUMIN SERPL-MCNC: 3.6 G/DL (ref 3.4–5)
ALP SERPL-CCNC: 70 U/L (ref 40–150)
ALT SERPL W P-5'-P-CCNC: 70 U/L (ref 0–50)
ANION GAP SERPL CALCULATED.3IONS-SCNC: 5 MMOL/L (ref 3–14)
AST SERPL W P-5'-P-CCNC: 70 U/L (ref 0–45)
BASOPHILS # BLD AUTO: 0.2 10E3/UL (ref 0–0.2)
BASOPHILS NFR BLD AUTO: 1 %
BILIRUB SERPL-MCNC: 2.5 MG/DL (ref 0.2–1.3)
BUN SERPL-MCNC: 7 MG/DL (ref 7–30)
CALCIUM SERPL-MCNC: 8.1 MG/DL (ref 8.5–10.1)
CHLORIDE BLD-SCNC: 112 MMOL/L (ref 94–109)
CO2 SERPL-SCNC: 23 MMOL/L (ref 20–32)
CREAT SERPL-MCNC: 0.51 MG/DL (ref 0.52–1.04)
EOSINOPHIL # BLD AUTO: 1.2 10E3/UL (ref 0–0.7)
EOSINOPHIL NFR BLD AUTO: 10 %
ERYTHROCYTE [DISTWIDTH] IN BLOOD BY AUTOMATED COUNT: 23.3 % (ref 10–15)
GFR SERPL CREATININE-BSD FRML MDRD: >90 ML/MIN/1.73M2
GLUCOSE BLD-MCNC: 88 MG/DL (ref 70–99)
HCT VFR BLD AUTO: 22.4 % (ref 35–47)
HGB BLD-MCNC: 7.7 G/DL (ref 11.7–15.7)
HOLD SPECIMEN: NORMAL
IMM GRANULOCYTES # BLD: 0 10E3/UL
IMM GRANULOCYTES NFR BLD: 0 %
LYMPHOCYTES # BLD AUTO: 3.3 10E3/UL (ref 0.8–5.3)
LYMPHOCYTES NFR BLD AUTO: 28 %
MCH RBC QN AUTO: 33.6 PG (ref 26.5–33)
MCHC RBC AUTO-ENTMCNC: 34.4 G/DL (ref 31.5–36.5)
MCV RBC AUTO: 98 FL (ref 78–100)
MONOCYTES # BLD AUTO: 1.1 10E3/UL (ref 0–1.3)
MONOCYTES NFR BLD AUTO: 9 %
NEUTROPHILS # BLD AUTO: 6.1 10E3/UL (ref 1.6–8.3)
NEUTROPHILS NFR BLD AUTO: 52 %
NRBC # BLD AUTO: 0.4 10E3/UL
NRBC BLD AUTO-RTO: 4 /100
PLATELET # BLD AUTO: 321 10E3/UL (ref 150–450)
POTASSIUM BLD-SCNC: 3.5 MMOL/L (ref 3.4–5.3)
PROT SERPL-MCNC: 7.2 G/DL (ref 6.8–8.8)
RBC # BLD AUTO: 2.29 10E6/UL (ref 3.8–5.2)
SODIUM SERPL-SCNC: 140 MMOL/L (ref 133–144)
WBC # BLD AUTO: 11.8 10E3/UL (ref 4–11)

## 2021-08-08 PROCEDURE — 96375 TX/PRO/DX INJ NEW DRUG ADDON: CPT | Performed by: EMERGENCY MEDICINE

## 2021-08-08 PROCEDURE — 258N000003 HC RX IP 258 OP 636: Performed by: EMERGENCY MEDICINE

## 2021-08-08 PROCEDURE — 96376 TX/PRO/DX INJ SAME DRUG ADON: CPT | Performed by: EMERGENCY MEDICINE

## 2021-08-08 PROCEDURE — 99285 EMERGENCY DEPT VISIT HI MDM: CPT | Performed by: EMERGENCY MEDICINE

## 2021-08-08 PROCEDURE — 96374 THER/PROPH/DIAG INJ IV PUSH: CPT | Performed by: EMERGENCY MEDICINE

## 2021-08-08 PROCEDURE — 250N000011 HC RX IP 250 OP 636: Performed by: EMERGENCY MEDICINE

## 2021-08-08 PROCEDURE — 96361 HYDRATE IV INFUSION ADD-ON: CPT | Performed by: EMERGENCY MEDICINE

## 2021-08-08 PROCEDURE — 82040 ASSAY OF SERUM ALBUMIN: CPT | Performed by: EMERGENCY MEDICINE

## 2021-08-08 PROCEDURE — 36415 COLL VENOUS BLD VENIPUNCTURE: CPT | Performed by: EMERGENCY MEDICINE

## 2021-08-08 PROCEDURE — 99285 EMERGENCY DEPT VISIT HI MDM: CPT | Mod: 25 | Performed by: EMERGENCY MEDICINE

## 2021-08-08 PROCEDURE — 85025 COMPLETE CBC W/AUTO DIFF WBC: CPT | Performed by: EMERGENCY MEDICINE

## 2021-08-08 RX ORDER — SODIUM CHLORIDE, SODIUM LACTATE, POTASSIUM CHLORIDE, CALCIUM CHLORIDE 600; 310; 30; 20 MG/100ML; MG/100ML; MG/100ML; MG/100ML
1000 INJECTION, SOLUTION INTRAVENOUS CONTINUOUS
Status: DISCONTINUED | OUTPATIENT
Start: 2021-08-08 | End: 2021-08-08

## 2021-08-08 RX ORDER — MORPHINE SULFATE 2 MG/ML
2 INJECTION, SOLUTION INTRAMUSCULAR; INTRAVENOUS
Status: DISCONTINUED | OUTPATIENT
Start: 2021-08-08 | End: 2021-08-08 | Stop reason: HOSPADM

## 2021-08-08 RX ORDER — HEPARIN SODIUM (PORCINE) LOCK FLUSH IV SOLN 100 UNIT/ML 100 UNIT/ML
5-10 SOLUTION INTRAVENOUS
Status: DISCONTINUED | OUTPATIENT
Start: 2021-08-08 | End: 2021-08-08 | Stop reason: HOSPADM

## 2021-08-08 RX ORDER — KETOROLAC TROMETHAMINE 15 MG/ML
15 INJECTION, SOLUTION INTRAMUSCULAR; INTRAVENOUS ONCE
Status: COMPLETED | OUTPATIENT
Start: 2021-08-08 | End: 2021-08-08

## 2021-08-08 RX ADMIN — Medication 5 ML: at 05:07

## 2021-08-08 RX ADMIN — MORPHINE SULFATE 2 MG: 2 INJECTION, SOLUTION INTRAMUSCULAR; INTRAVENOUS at 03:35

## 2021-08-08 RX ADMIN — MORPHINE SULFATE 2 MG: 2 INJECTION, SOLUTION INTRAMUSCULAR; INTRAVENOUS at 02:33

## 2021-08-08 RX ADMIN — SODIUM CHLORIDE 1000 ML: 9 INJECTION, SOLUTION INTRAVENOUS at 02:34

## 2021-08-08 RX ADMIN — KETOROLAC TROMETHAMINE 15 MG: 15 INJECTION, SOLUTION INTRAMUSCULAR; INTRAVENOUS at 02:33

## 2021-08-08 ASSESSMENT — MIFFLIN-ST. JEOR: SCORE: 1493.44

## 2021-08-08 ASSESSMENT — ENCOUNTER SYMPTOMS
BACK PAIN: 1
FEVER: 0
SHORTNESS OF BREATH: 0

## 2021-08-08 NOTE — DISCHARGE INSTRUCTIONS
Follow-up with hematology as planned next week.    Continue take your medications including your blood thinners as prescribed    Return to the emergency department if you get worsening pain, shortness of breath or if you have any further concerns

## 2021-08-08 NOTE — ED TRIAGE NOTES
Pt BIB mother from home for sickle cell pain crisis.  Pain started at 2000 and was not responding to home medications. Pain is currently at 10/10 located in her lower back.

## 2021-08-08 NOTE — ED PROVIDER NOTES
"ED Provider Note  [facility] EMERGENCY DEPARTMENT ([facility])  August 8, 2021    History     Chief Complaint   Patient presents with     Sickle Cell Pain Crisis     HPI  Jennifer Cervantes is a 21 year old female with a past medical history including sickle cell disease and crisis, superficial venous thrombosis of right arm, cerebral infarction, acute respiratory failure with hypoxia, multiple subsegmental pulmonary emboli without acute cor pulmonale, oppositional defiant behavior, panic disorder who presents to the Emergency Department for evaluation of sickle cell pain crisis. Patient reports that she began experiencing sickle cell crisis pain around 8 PM earlier this night (6 hours ago). Pain the same today in her back as it usually is, she states that it is somewhat improved today but that her sickle cell pain is recurrent and \"comes out of nowhere\". She denies any chest pain, shortness of breath, lower extremity swelling or pain, or fevers.    Past Medical History  Past Medical History:   Diagnosis Date     Anxiety      Bleeding disorder (H)      Blood clotting disorder (H)      Cerebral infarction (H) 2015     Cognitive developmental delay     low IQ. 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 (H)     right hand contractures     Iron overload due to repeated red blood cell transfusions      Migraines      Multiple subsegmental pulmonary emboli without acute cor pulmonale (H) 02/01/2021     Oppositional defiant behavior      Superficial venous thrombosis of arm, right 03/25/2021     Uncomplicated asthma      Past Surgical History:   Procedure Laterality Date     AS INSERT TUNNELED CV 2 CATH W/O PORT/PUMP       C BREAST REDUCTION (INCLUDES LIPO) TIER 3 Bilateral 04/23/2019     CHOLECYSTECTOMY       INSERT PORT VASCULAR ACCESS Left " 4/21/2021    Procedure: INSERTION, VASCULAR ACCESS PORT (NOT SURE ON SIDE UNTIL REMOVAL);  Surgeon: Rajan More MD;  Location: [facility] OR     IR CHEST PORT PLACEMENT > 5 YRS OF AGE  4/21/2021     IR CVC NON TUNNEL LINE REMOVAL  5/6/2021     IR CVC NON TUNNEL PLACEMENT  04/07/2020     IR CVC NON TUNNEL PLACEMENT  4/30/2021     IR PORT REMOVAL LEFT  4/21/2021     REMOVE PORT VASCULAR ACCESS Left 4/21/2021    Procedure: REMOVAL, VASCULAR ACCESS PORT LEFT;  Surgeon: Rajan More MD;  Location: [facility] OR     REPAIR TENDON ELBOW Right 10/02/2019    Procedure: Right Elbow Flexor Lengthening, Flexor Pronator Slide Of Wrist and Finger, Thumb Adductor Lengthening;  Surgeon: Anai Franco MD;  Location: [facility] OR     TONSILLECTOMY Bilateral 10/02/2019    Procedure: Bilateral Tonsillectomy;  Surgeon: Farhana Guy MD;  Location: [facility] OR     oxyCODONE IR (ROXICODONE) 15 MG tablet  acetaminophen (TYLENOL) 325 MG tablet  albuterol (PROAIR HFA/PROVENTIL HFA/VENTOLIN HFA) 108 (90 Base) MCG/ACT inhaler  albuterol (PROVENTIL) (2.5 MG/3ML) 0.083% neb solution  ARIPiprazole (ABILIFY) 2 MG tablet  aspirin (ASA) 81 MG chewable tablet  budesonide-formoterol (SYMBICORT) 160-4.5 MCG/ACT Inhaler  dabigatran ANTICOAGULANT (PRADAXA) 150 MG capsule  diclofenac (VOLTAREN) 1 % topical gel  diphenhydrAMINE (BENADRYL) 25 MG capsule  EPINEPHrine (ANY BX GENERIC EQUIV) 0.3 MG/0.3ML injection 2-pack  Hydroxyurea 1000 MG TABS  hydrOXYzine (ATARAX) 25 MG tablet  JADENU 360 MG tablet  lidocaine-prilocaine (EMLA) 2.5-2.5 % external cream  medroxyPROGESTERone (DEPO-PROVERA) 150 MG/ML IM injection  naloxone (NARCAN) 4 MG/0.1ML nasal spray  ondansetron (ZOFRAN) 8 MG tablet  oxyCODONE (OXYCONTIN) 10 MG 12 hr tablet  sertraline (ZOLOFT) 100 MG tablet      Allergies   Allergen Reactions     Contrast Dye      Hives and breathing issues     Fish-Derived Products Hives     Seafood Hives     Diagnostic X-Ray Materials      Gadolinium      Family  "History  Family History   Problem Relation Age of Onset     Sickle Cell Trait Mother      Hypertension Mother      Asthma Mother      Sickle Cell Trait Father      Social History   Social History     Tobacco Use     Smoking status: Never Smoker     Smokeless tobacco: Never Used   Substance Use Topics     Alcohol use: Not Currently     Alcohol/week: 0.0 standard drinks     Drug use: Never      Past medical history, past surgical history, medications, allergies, family history, and social history were reviewed with the patient. No additional pertinent items.       Review of Systems   Constitutional: Negative for fever.   Respiratory: Negative for shortness of breath.    Cardiovascular: Negative for chest pain.   Musculoskeletal: Positive for back pain (sickle cell crisis).   All other systems reviewed and are negative.    A complete review of systems was performed with pertinent positives and negatives noted in the HPI, and all other systems negative.    Physical Exam   BP: 129/86  Pulse: 107  Temp: 98.1  F (36.7  C)  Resp: 15  Height: 162.6 cm (5' 4\")  Weight: 74.8 kg (165 lb)  SpO2: 93 %  Physical Exam  Physical Exam   Constitutional: oriented to person, place, and time. appears well-developed and well-nourished.   HENT:   Head: Normocephalic and atraumatic.   Neck: Normal range of motion.   Pulmonary/Chest: Effort normal. No respiratory distress. Clear lungs bilaterally.  Cardiac: No murmurs, rubs, gallops. RRR.  Abdominal: Abdomen soft, nontender, nondistended. No rebound tenderness.  MSK: Long bones without deformity or evidence of trauma.  No tenderness over the thoracic or lumbar spine.  Neurological: alert and oriented to person, place, and time. Gait stable.  No focal defects.  Skin: Skin is warm and dry.   Psychiatric:  normal mood and affect.  behavior is normal. Thought content normal.     ED Course     1:56 AM  The patient was seen and examined by Andrew Chou MD in Room ED07.    Procedures     "   Results for orders placed or performed during the hospital encounter of 08/08/21   [place] Draw     Status: None (In process)    Narrative    The following orders were created for panel order [place] Draw.  Procedure                               Abnormality         Status                     ---------                               -----------         ------                     Extra Blue Top Tube[[id number]]                              In process                 Extra Red Top Tube[[id number]]                               In process                 Extra Green Top (Lithium...[[id number]]                      In process                 Extra Purple Top Tube[[id number]]                            In process                   Please view results for these tests on the individual orders.     Medications - No data to display     Assessments & Plan (with Medical Decision Making)   MDM   patient presenting with sickle cell pain crisis.  Improved after 2 doses of IV pain meds.  She would like to be discharged.  She has no shortness of breath or chest pain at this point.  Recent VQ scan shows small new pulmonary embolism.  I discussed with hematology fellow on-call regarding anticoagulation.  Recommended continuing Pradaxa as already prescribed, they will follow up with her next week regarding her anticoagulation strategy.  No need for heparin and admission at this point.  She is otherwise stable, saturating 90% on room air and appears quite well.  She will return if she has any worsening concerns or symptoms.    I have reviewed the nursing notes. I have reviewed the findings, diagnosis, plan and need for follow up with the patient.    New Prescriptions    No medications on file       Final diagnoses:   Sickle cell pain crisis (H)     IKiarra, am serving as a trained medical scribe to document services personally performed by Andrew Chou MD, based on the provider's statements to me.   I, Andrew Chou MD, was  physically present and have reviewed and verified the accuracy of this note documented by Kiarra Valdez.    --  Andrew Chou  [facility] EMERGENCY DEPARTMENT  8/8/2021     Andrew Chou MD  08/08/21 7743

## 2021-08-09 ENCOUNTER — INFUSION THERAPY VISIT (OUTPATIENT)
Dept: INFUSION THERAPY | Facility: CLINIC | Age: 21
End: 2021-08-09
Attending: PEDIATRICS
Payer: COMMERCIAL

## 2021-08-09 VITALS
RESPIRATION RATE: 16 BRPM | OXYGEN SATURATION: 92 % | TEMPERATURE: 99.1 F | DIASTOLIC BLOOD PRESSURE: 89 MMHG | HEART RATE: 105 BPM | SYSTOLIC BLOOD PRESSURE: 117 MMHG

## 2021-08-09 DIAGNOSIS — D57.00 SICKLE CELL PAIN CRISIS (H): Primary | ICD-10-CM

## 2021-08-09 PROCEDURE — 96361 HYDRATE IV INFUSION ADD-ON: CPT

## 2021-08-09 PROCEDURE — 96374 THER/PROPH/DIAG INJ IV PUSH: CPT

## 2021-08-09 PROCEDURE — 96375 TX/PRO/DX INJ NEW DRUG ADDON: CPT

## 2021-08-09 PROCEDURE — 250N000011 HC RX IP 250 OP 636: Performed by: PEDIATRICS

## 2021-08-09 PROCEDURE — 258N000003 HC RX IP 258 OP 636: Performed by: PEDIATRICS

## 2021-08-09 PROCEDURE — 96376 TX/PRO/DX INJ SAME DRUG ADON: CPT

## 2021-08-09 RX ORDER — HEPARIN SODIUM,PORCINE 10 UNIT/ML
5 VIAL (ML) INTRAVENOUS
Status: CANCELLED | OUTPATIENT
Start: 2021-08-09

## 2021-08-09 RX ORDER — MORPHINE SULFATE 2 MG/ML
2 INJECTION, SOLUTION INTRAMUSCULAR; INTRAVENOUS
Status: CANCELLED
Start: 2021-08-09

## 2021-08-09 RX ORDER — HEPARIN SODIUM (PORCINE) LOCK FLUSH IV SOLN 100 UNIT/ML 100 UNIT/ML
5 SOLUTION INTRAVENOUS
Status: DISCONTINUED | OUTPATIENT
Start: 2021-08-09 | End: 2021-08-09 | Stop reason: HOSPADM

## 2021-08-09 RX ORDER — MORPHINE SULFATE 2 MG/ML
2 INJECTION, SOLUTION INTRAMUSCULAR; INTRAVENOUS
Status: DISCONTINUED | OUTPATIENT
Start: 2021-08-09 | End: 2021-08-09 | Stop reason: HOSPADM

## 2021-08-09 RX ORDER — ONDANSETRON 4 MG/1
8 TABLET, FILM COATED ORAL
Status: DISCONTINUED | OUTPATIENT
Start: 2021-08-09 | End: 2021-08-09 | Stop reason: CLARIF

## 2021-08-09 RX ORDER — ONDANSETRON 8 MG/1
8 TABLET, FILM COATED ORAL
Status: CANCELLED
Start: 2021-08-09

## 2021-08-09 RX ORDER — DIPHENHYDRAMINE HCL 25 MG
25 CAPSULE ORAL
Status: CANCELLED
Start: 2021-08-09

## 2021-08-09 RX ORDER — HEPARIN SODIUM (PORCINE) LOCK FLUSH IV SOLN 100 UNIT/ML 100 UNIT/ML
5 SOLUTION INTRAVENOUS
Status: CANCELLED | OUTPATIENT
Start: 2021-08-09

## 2021-08-09 RX ORDER — DIPHENHYDRAMINE HCL 25 MG
25 CAPSULE ORAL
Status: DISCONTINUED | OUTPATIENT
Start: 2021-08-09 | End: 2021-08-09 | Stop reason: CLARIF

## 2021-08-09 RX ADMIN — MORPHINE SULFATE 2 MG: 2 INJECTION, SOLUTION INTRAMUSCULAR; INTRAVENOUS at 14:13

## 2021-08-09 RX ADMIN — MORPHINE SULFATE 2 MG: 2 INJECTION, SOLUTION INTRAMUSCULAR; INTRAVENOUS at 16:05

## 2021-08-09 RX ADMIN — DEXTROSE AND SODIUM CHLORIDE 500 ML: 5; 450 INJECTION, SOLUTION INTRAVENOUS at 14:13

## 2021-08-09 RX ADMIN — MORPHINE SULFATE 2 MG: 2 INJECTION, SOLUTION INTRAMUSCULAR; INTRAVENOUS at 15:11

## 2021-08-09 RX ADMIN — HEPARIN 5 ML: 100 SYRINGE at 16:17

## 2021-08-09 ASSESSMENT — PAIN SCALES - GENERAL: PAINLEVEL: EXTREME PAIN (9)

## 2021-08-09 NOTE — PROGRESS NOTES
This is a recent snapshot of the patient's [facility] medical record.  For current drug dose and complete information and questions, call [phone] or In Basket pool, [facility] home infusion ([id number])  CSN Number:  [id number]

## 2021-08-09 NOTE — PROGRESS NOTES
Infusion Nursing Note:  Jennifer Cervantes presents today for fluids and pain meds.    Patient seen by provider today: No   present during visit today: Not Applicable.    Note: Patient states she has been having low back pain, rates a 9.      Intravenous Access:  Implanted Port.    Treatment Conditions:  Not Applicable.      Post Infusion Assessment:  Patient tolerated infusion without incident.  Rates pain a 5 at time of discharge, states this is manageable  Blood return noted pre and post infusion.  Site patent and intact, free from redness, edema or discomfort.  No evidence of extravasations.  Access discontinued per protocol.       Discharge Plan:   Patient discharged in stable condition accompanied by: self.  Departure Mode: Ambulatory.      Zaynab Barron RN

## 2021-08-10 ENCOUNTER — HOSPITAL ENCOUNTER (EMERGENCY)
Facility: CLINIC | Age: 21
Discharge: HOME | End: 2021-08-10
Attending: EMERGENCY MEDICINE | Admitting: EMERGENCY MEDICINE
Payer: COMMERCIAL

## 2021-08-10 VITALS
RESPIRATION RATE: 16 BRPM | BODY MASS INDEX: 28.17 KG/M2 | WEIGHT: 165 LBS | TEMPERATURE: 98.6 F | SYSTOLIC BLOOD PRESSURE: 115 MMHG | HEART RATE: 87 BPM | OXYGEN SATURATION: 98 % | DIASTOLIC BLOOD PRESSURE: 75 MMHG | HEIGHT: 64 IN

## 2021-08-10 DIAGNOSIS — D57.00 SICKLE CELL PAIN CRISIS (H): ICD-10-CM

## 2021-08-10 LAB
ALBUMIN SERPL-MCNC: 3.5 G/DL (ref 3.4–5)
ALBUMIN SERPL-MCNC: 3.6 G/DL (ref 3.4–5)
ALP SERPL-CCNC: 64 U/L (ref 40–150)
ALP SERPL-CCNC: 66 U/L (ref 40–150)
ALT SERPL W P-5'-P-CCNC: 61 U/L (ref 0–50)
ALT SERPL W P-5'-P-CCNC: 63 U/L (ref 0–50)
ANION GAP SERPL CALCULATED.3IONS-SCNC: 8 MMOL/L (ref 3–14)
ANION GAP SERPL CALCULATED.3IONS-SCNC: 9 MMOL/L (ref 3–14)
AST SERPL W P-5'-P-CCNC: 63 U/L (ref 0–45)
AST SERPL W P-5'-P-CCNC: 66 U/L (ref 0–45)
BASOPHILS # BLD AUTO: 0.2 10E3/UL (ref 0–0.2)
BASOPHILS NFR BLD AUTO: 1 %
BILIRUB SERPL-MCNC: 3 MG/DL (ref 0.2–1.3)
BILIRUB SERPL-MCNC: 3.1 MG/DL (ref 0.2–1.3)
BUN SERPL-MCNC: 5 MG/DL (ref 7–30)
BUN SERPL-MCNC: 6 MG/DL (ref 7–30)
CALCIUM SERPL-MCNC: 8.1 MG/DL (ref 8.5–10.1)
CALCIUM SERPL-MCNC: 8.5 MG/DL (ref 8.5–10.1)
CHLORIDE BLD-SCNC: 109 MMOL/L (ref 94–109)
CHLORIDE BLD-SCNC: 110 MMOL/L (ref 94–109)
CO2 SERPL-SCNC: 21 MMOL/L (ref 20–32)
CO2 SERPL-SCNC: 22 MMOL/L (ref 20–32)
CREAT SERPL-MCNC: 0.5 MG/DL (ref 0.52–1.04)
CREAT SERPL-MCNC: 0.54 MG/DL (ref 0.52–1.04)
EOSINOPHIL # BLD AUTO: 0.2 10E3/UL (ref 0–0.7)
EOSINOPHIL NFR BLD AUTO: 2 %
ERYTHROCYTE [DISTWIDTH] IN BLOOD BY AUTOMATED COUNT: 22.1 % (ref 10–15)
GFR SERPL CREATININE-BSD FRML MDRD: >90 ML/MIN/1.73M2
GFR SERPL CREATININE-BSD FRML MDRD: >90 ML/MIN/1.73M2
GLUCOSE BLD-MCNC: 92 MG/DL (ref 70–99)
GLUCOSE BLD-MCNC: 95 MG/DL (ref 70–99)
HCG SERPL QL: NEGATIVE
HCT VFR BLD AUTO: 22.1 % (ref 35–47)
HGB BLD-MCNC: 7.6 G/DL (ref 11.7–15.7)
HOLD SPECIMEN: NORMAL
HOLD SPECIMEN: NORMAL
IMM GRANULOCYTES # BLD: 0 10E3/UL
IMM GRANULOCYTES NFR BLD: 0 %
LIPASE SERPL-CCNC: 74 U/L (ref 73–393)
LYMPHOCYTES # BLD AUTO: 1.8 10E3/UL (ref 0.8–5.3)
LYMPHOCYTES NFR BLD AUTO: 13 %
MCH RBC QN AUTO: 33.5 PG (ref 26.5–33)
MCHC RBC AUTO-ENTMCNC: 34.4 G/DL (ref 31.5–36.5)
MCV RBC AUTO: 97 FL (ref 78–100)
MONOCYTES # BLD AUTO: 0.8 10E3/UL (ref 0–1.3)
MONOCYTES NFR BLD AUTO: 6 %
NEUTROPHILS # BLD AUTO: 11 10E3/UL (ref 1.6–8.3)
NEUTROPHILS NFR BLD AUTO: 78 %
NRBC # BLD AUTO: 0.5 10E3/UL
NRBC BLD AUTO-RTO: 4 /100
PLATELET # BLD AUTO: 272 10E3/UL (ref 150–450)
POTASSIUM BLD-SCNC: 3.6 MMOL/L (ref 3.4–5.3)
POTASSIUM BLD-SCNC: 3.6 MMOL/L (ref 3.4–5.3)
PROT SERPL-MCNC: 7.1 G/DL (ref 6.8–8.8)
PROT SERPL-MCNC: 7.1 G/DL (ref 6.8–8.8)
RBC # BLD AUTO: 2.27 10E6/UL (ref 3.8–5.2)
RETICS # AUTO: 0.39 10E6/UL (ref 0.03–0.1)
RETICS/RBC NFR AUTO: 17.2 % (ref 0.5–2)
SODIUM SERPL-SCNC: 139 MMOL/L (ref 133–144)
SODIUM SERPL-SCNC: 140 MMOL/L (ref 133–144)
WBC # BLD AUTO: 14.1 10E3/UL (ref 4–11)

## 2021-08-10 PROCEDURE — 99285 EMERGENCY DEPT VISIT HI MDM: CPT | Mod: 25 | Performed by: EMERGENCY MEDICINE

## 2021-08-10 PROCEDURE — 36415 COLL VENOUS BLD VENIPUNCTURE: CPT | Performed by: EMERGENCY MEDICINE

## 2021-08-10 PROCEDURE — 84450 TRANSFERASE (AST) (SGOT): CPT | Performed by: EMERGENCY MEDICINE

## 2021-08-10 PROCEDURE — 83690 ASSAY OF LIPASE: CPT | Performed by: EMERGENCY MEDICINE

## 2021-08-10 PROCEDURE — 85025 COMPLETE CBC W/AUTO DIFF WBC: CPT | Performed by: EMERGENCY MEDICINE

## 2021-08-10 PROCEDURE — 96376 TX/PRO/DX INJ SAME DRUG ADON: CPT | Performed by: EMERGENCY MEDICINE

## 2021-08-10 PROCEDURE — 84155 ASSAY OF PROTEIN SERUM: CPT | Performed by: EMERGENCY MEDICINE

## 2021-08-10 PROCEDURE — 85045 AUTOMATED RETICULOCYTE COUNT: CPT | Performed by: EMERGENCY MEDICINE

## 2021-08-10 PROCEDURE — 250N000011 HC RX IP 250 OP 636: Performed by: EMERGENCY MEDICINE

## 2021-08-10 PROCEDURE — 99284 EMERGENCY DEPT VISIT MOD MDM: CPT | Performed by: EMERGENCY MEDICINE

## 2021-08-10 PROCEDURE — 96374 THER/PROPH/DIAG INJ IV PUSH: CPT | Performed by: EMERGENCY MEDICINE

## 2021-08-10 PROCEDURE — 80053 COMPREHEN METABOLIC PANEL: CPT | Performed by: EMERGENCY MEDICINE

## 2021-08-10 PROCEDURE — 84703 CHORIONIC GONADOTROPIN ASSAY: CPT | Performed by: EMERGENCY MEDICINE

## 2021-08-10 RX ORDER — MORPHINE SULFATE 2 MG/ML
2 INJECTION, SOLUTION INTRAMUSCULAR; INTRAVENOUS
Status: DISCONTINUED | OUTPATIENT
Start: 2021-08-10 | End: 2021-08-10 | Stop reason: HOSPADM

## 2021-08-10 RX ORDER — MORPHINE SULFATE 4 MG/ML
4 INJECTION, SOLUTION INTRAMUSCULAR; INTRAVENOUS
Status: DISCONTINUED | OUTPATIENT
Start: 2021-08-10 | End: 2021-08-10 | Stop reason: HOSPADM

## 2021-08-10 RX ORDER — MORPHINE SULFATE 2 MG/ML
2 INJECTION, SOLUTION INTRAMUSCULAR; INTRAVENOUS ONCE
Status: COMPLETED | OUTPATIENT
Start: 2021-08-10 | End: 2021-08-10

## 2021-08-10 RX ADMIN — MORPHINE SULFATE 2 MG: 2 INJECTION, SOLUTION INTRAMUSCULAR; INTRAVENOUS at 07:43

## 2021-08-10 RX ADMIN — MORPHINE SULFATE 4 MG: 4 INJECTION INTRAVENOUS at 12:03

## 2021-08-10 RX ADMIN — MORPHINE SULFATE 2 MG: 2 INJECTION, SOLUTION INTRAMUSCULAR; INTRAVENOUS at 06:27

## 2021-08-10 RX ADMIN — MORPHINE SULFATE 4 MG: 4 INJECTION INTRAVENOUS at 10:00

## 2021-08-10 ASSESSMENT — MIFFLIN-ST. JEOR: SCORE: 1493.44

## 2021-08-10 NOTE — DISCHARGE INSTRUCTIONS
Continue your normal medications. Follow up with your hematologist as soon as possible. Return with any concerns.

## 2021-08-10 NOTE — ED TRIAGE NOTES
Pt presents to the ER with c/o of Sickle Cell pain in legs bilaterally. Pt also reports nausea and vomiting.

## 2021-08-10 NOTE — ED PROVIDER NOTES
ED Provider Note  [facility]      History     Chief Complaint   Patient presents with     Sickle Cell Pain Crisis     HPI  Jennifer Cervantes is a 21 year old female w/ PMH of sickle cell disease and crisis, superficial venous thrombosis of right arm, cerebral infarction, acute respiratory failure with hypoxia, multiple subsegmental pulmonary emboli without acute cor pulmonale, oppositional defiant behavior, panic disorder , who presents to the Emergency Department with complaint of sickle cell pain crisis.  She reports that yesterday evening she started to have some bilateral low back pain.  She does admit that this is similar to the sickle cell pain she has been having over the last week or so.  However, she states she did fine earlier in the day, and it came on suddenly in the evening.  She states that after it started she felt very nauseous and did vomit 3 times, nonbloody emesis, but is had no vomiting since then.  No diarrhea.  No fever, cough, shortness of breath, chest pain, abdominal pain, dysuria.  No acute numbness or weakness.  She states she took her normal home meds, tried hot packs and laying under warm covers without any improvement.  She states that her next appointment with hematology is next week, but she is going to try to get in earlier due to her increased frequency of pain crises.    Past Medical History  Past Medical History:   Diagnosis Date     Anxiety      Bleeding disorder (H)      Blood clotting disorder (H)      Cerebral infarction (H) 2015     Cognitive developmental delay     low IQ. 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 (H)     right hand contractures     Iron overload due to repeated red blood cell transfusions      Migraines      Multiple subsegmental pulmonary emboli without acute cor pulmonale (H) 02/01/2021      Oppositional defiant behavior      Superficial venous thrombosis of arm, right 03/25/2021     Uncomplicated asthma      Past Surgical History:   Procedure Laterality Date     AS INSERT TUNNELED CV 2 CATH W/O PORT/PUMP       C BREAST REDUCTION (INCLUDES LIPO) TIER 3 Bilateral 04/23/2019     CHOLECYSTECTOMY       INSERT PORT VASCULAR ACCESS Left 4/21/2021    Procedure: INSERTION, VASCULAR ACCESS PORT (NOT SURE ON SIDE UNTIL REMOVAL);  Surgeon: Rajan More MD;  Location: [facility] OR     IR CHEST PORT PLACEMENT > 5 YRS OF AGE  4/21/2021     IR CVC NON TUNNEL LINE REMOVAL  5/6/2021     IR CVC NON TUNNEL PLACEMENT  04/07/2020     IR CVC NON TUNNEL PLACEMENT  4/30/2021     IR PORT REMOVAL LEFT  4/21/2021     REMOVE PORT VASCULAR ACCESS Left 4/21/2021    Procedure: REMOVAL, VASCULAR ACCESS PORT LEFT;  Surgeon: Rajan More MD;  Location: [facility] OR     REPAIR TENDON ELBOW Right 10/02/2019    Procedure: Right Elbow Flexor Lengthening, Flexor Pronator Slide Of Wrist and Finger, Thumb Adductor Lengthening;  Surgeon: Anai Franco MD;  [facility]     TONSILLECTOMY Bilateral 10/02/2019    Procedure: Bilateral Tonsillectomy;  Surgeon: Farhana Guy MD;  Location: [facility] OR     acetaminophen (TYLENOL) 325 MG tablet  albuterol (PROAIR HFA/PROVENTIL HFA/VENTOLIN HFA) 108 (90 Base) MCG/ACT inhaler  albuterol (PROVENTIL) (2.5 MG/3ML) 0.083% neb solution  ARIPiprazole (ABILIFY) 2 MG tablet  aspirin (ASA) 81 MG chewable tablet  budesonide-formoterol (SYMBICORT) 160-4.5 MCG/ACT Inhaler  dabigatran ANTICOAGULANT (PRADAXA) 150 MG capsule  diclofenac (VOLTAREN) 1 % topical gel  diphenhydrAMINE (BENADRYL) 25 MG capsule  EPINEPHrine (ANY BX GENERIC EQUIV) 0.3 MG/0.3ML injection 2-pack  Hydroxyurea 1000 MG TABS  hydrOXYzine (ATARAX) 25 MG tablet  JADENU 360 MG tablet  lidocaine-prilocaine (EMLA) 2.5-2.5 % external cream  medroxyPROGESTERone (DEPO-PROVERA) 150 MG/ML IM injection  naloxone (NARCAN) 4 MG/0.1ML nasal  "spray  ondansetron (ZOFRAN) 8 MG tablet  oxyCODONE (OXYCONTIN) 10 MG 12 hr tablet  oxyCODONE IR (ROXICODONE) 15 MG tablet  sertraline (ZOLOFT) 100 MG tablet      Allergies   Allergen Reactions     Contrast Dye      Hives and breathing issues     Fish-Derived Products Hives     Seafood Hives     Diagnostic X-Ray Materials      Gadolinium      Family History  Family History   Problem Relation Age of Onset     Sickle Cell Trait Mother      Hypertension Mother      Asthma Mother      Sickle Cell Trait Father      Social History   Social History     Tobacco Use     Smoking status: Never Smoker     Smokeless tobacco: Never Used   Substance Use Topics     Alcohol use: Not Currently     Alcohol/week: 0.0 standard drinks     Drug use: Never      Past medical history, past surgical history, medications, allergies, family history, and social history were reviewed with the patient. No additional pertinent items.       Review of Systems  A complete review of systems was performed with pertinent positives and negatives noted in the HPI, and all other systems negative.    Physical Exam   BP: (!) 133/92  Pulse: 96  Temp: 98.6  F (37  C)  Resp: 16  Height: 162.6 cm (5' 4\")  Weight: 74.8 kg (165 lb)  SpO2: 93 %  Physical Exam  Constitutional:       General: She is not in acute distress.     Appearance: She is not diaphoretic.   HENT:      Head: Atraumatic.   Eyes:      General: No scleral icterus.  Cardiovascular:      Heart sounds: Normal heart sounds.   Pulmonary:      Effort: No respiratory distress.      Breath sounds: Normal breath sounds.   Abdominal:      Palpations: Abdomen is soft.      Tenderness: There is no abdominal tenderness.   Musculoskeletal:         General: No tenderness.      Comments: Distal strength and sensation equal bilaterally   Skin:     General: Skin is warm.      Findings: No rash.         ED Course      Procedures         Results for orders placed or performed during the hospital encounter of 08/10/21 "   CBC with platelets differential     Status: Abnormal (In process)    Narrative    The following orders were created for panel order CBC with platelets differential.  Procedure                               Abnormality         Status                     ---------                               -----------         ------                     CBC with platelets and d...[[id number]]  Abnormal            Preliminary result           Please view results for these tests on the individual orders.   Comprehensive metabolic panel     Status: Abnormal   Result Value Ref Range    Sodium 140 133 - 144 mmol/L    Potassium 3.6 3.4 - 5.3 mmol/L    Chloride 110 (H) 94 - 109 mmol/L    Carbon Dioxide (CO2) 22 20 - 32 mmol/L    Anion Gap 8 3 - 14 mmol/L    Urea Nitrogen 6 (L) 7 - 30 mg/dL    Creatinine 0.50 (L) 0.52 - 1.04 mg/dL    Calcium 8.5 8.5 - 10.1 mg/dL    Glucose 95 70 - 99 mg/dL    Alkaline Phosphatase 66 40 - 150 U/L    AST 63 (H) 0 - 45 U/L    ALT 61 (H) 0 - 50 U/L    Protein Total 7.1 6.8 - 8.8 g/dL    Albumin 3.5 3.4 - 5.0 g/dL    Bilirubin Total 3.1 (H) 0.2 - 1.3 mg/dL    GFR Estimate >90 >60 mL/min/1.73m2   HCG qualitative pregnancy (blood)     Status: Normal   Result Value Ref Range    hCG Serum Qualitative Negative Negative   Lipase     Status: Normal   Result Value Ref Range    Lipase 74 73 - 393 U/L   CBC with platelets and differential     Status: Abnormal (Preliminary result)   Result Value Ref Range    WBC Count 14.1 (H) 4.0 - 11.0 10e3/uL    RBC Count 2.27 (L) 3.80 - 5.20 10e6/uL    Hemoglobin 7.6 (L) 11.7 - 15.7 g/dL    Hematocrit 22.1 (L) 35.0 - 47.0 %    MCV 97 78 - 100 fL    MCH 33.5 (H) 26.5 - 33.0 pg    MCHC 34.4 31.5 - 36.5 g/dL    RDW 22.1 (H) 10.0 - 15.0 %    Platelet Count 272 150 - 450 10e3/uL   Extra Blue Top Tube     Status: None   Result Value Ref Range    Hold Specimen JIC    Extra Green Top (Lithium Heparin) Tube     Status: None   Result Value Ref Range    Hold Specimen JIC    Extra Tube      Status: None    Narrative    The following orders were created for panel order Extra Tube.  Procedure                               Abnormality         Status                     ---------                               -----------         ------                     Extra Blue Top Tube[[id number]]                              Final result               Extra Green Top (Lithium...[[id number]]                      Final result                 Please view results for these tests on the individual orders.     Medications   morphine (PF) injection 2 mg (has no administration in time range)   morphine (PF) injection 2 mg (2 mg Intravenous Given 8/10/21 0627)        Assessments & Plan (with Medical Decision Making)   Patient states that her current pain is consistent with her previous bouts of sickle cell pain crisis.  She denies any infectious signs or symptoms.  Urinalysis was ordered and is pending.  She does have a mildly elevated white count of 14.1.  She oftentimes does have a mildly elevated white count, this is slightly higher than its been recently.  She did have 3 bouts of vomiting yesterday, but none today, no ongoing abdominal pain.  She was given some fluids as well as pain medications per her protocol.  She will be signed out at shift change pending repeat assessment of her pain once her 3 doses of pain medication have been given, as well is follow-up on her UA.  Disposition will depend how she is feeling.    Dictation Disclaimer: Some of this Note has been completed with voice-recognition dictation software. Although errors are generally corrected real-time, there is the potential for a rare error to be present in the completed chart.      I have reviewed the nursing notes. I have reviewed the findings, diagnosis, plan and need for follow up with the patient.    New Prescriptions    No medications on file       Final diagnoses:   Sickle cell pain crisis (H)       --    [facility] EMERGENCY  DEPARTMENT  8/10/2021     Court Ring MD  08/10/21 0755

## 2021-08-11 ENCOUNTER — INFUSION THERAPY VISIT (OUTPATIENT)
Dept: INFUSION THERAPY | Facility: CLINIC | Age: 21
End: 2021-08-11
Attending: PEDIATRICS
Payer: COMMERCIAL

## 2021-08-11 VITALS
TEMPERATURE: 98.2 F | SYSTOLIC BLOOD PRESSURE: 139 MMHG | HEART RATE: 91 BPM | RESPIRATION RATE: 18 BRPM | DIASTOLIC BLOOD PRESSURE: 87 MMHG

## 2021-08-11 DIAGNOSIS — D57.00 SICKLE CELL PAIN CRISIS (H): Primary | ICD-10-CM

## 2021-08-11 PROCEDURE — 96376 TX/PRO/DX INJ SAME DRUG ADON: CPT

## 2021-08-11 PROCEDURE — 96361 HYDRATE IV INFUSION ADD-ON: CPT

## 2021-08-11 PROCEDURE — 96374 THER/PROPH/DIAG INJ IV PUSH: CPT

## 2021-08-11 PROCEDURE — 258N000003 HC RX IP 258 OP 636: Performed by: PEDIATRICS

## 2021-08-11 PROCEDURE — 250N000011 HC RX IP 250 OP 636: Performed by: PEDIATRICS

## 2021-08-11 RX ORDER — MORPHINE SULFATE 2 MG/ML
2 INJECTION, SOLUTION INTRAMUSCULAR; INTRAVENOUS
Status: CANCELLED
Start: 2021-08-11

## 2021-08-11 RX ORDER — HEPARIN SODIUM,PORCINE 10 UNIT/ML
5 VIAL (ML) INTRAVENOUS
Status: CANCELLED | OUTPATIENT
Start: 2021-08-11

## 2021-08-11 RX ORDER — ONDANSETRON 8 MG/1
8 TABLET, FILM COATED ORAL
Status: CANCELLED
Start: 2021-08-11

## 2021-08-11 RX ORDER — DIPHENHYDRAMINE HCL 25 MG
25 CAPSULE ORAL
Status: DISCONTINUED | OUTPATIENT
Start: 2021-08-11 | End: 2021-08-11 | Stop reason: HOSPADM

## 2021-08-11 RX ORDER — MORPHINE SULFATE 2 MG/ML
2 INJECTION, SOLUTION INTRAMUSCULAR; INTRAVENOUS
Status: COMPLETED | OUTPATIENT
Start: 2021-08-11 | End: 2021-08-11

## 2021-08-11 RX ORDER — HEPARIN SODIUM (PORCINE) LOCK FLUSH IV SOLN 100 UNIT/ML 100 UNIT/ML
5 SOLUTION INTRAVENOUS
Status: CANCELLED | OUTPATIENT
Start: 2021-08-11

## 2021-08-11 RX ORDER — ONDANSETRON 8 MG/1
8 TABLET, FILM COATED ORAL
Status: DISCONTINUED | OUTPATIENT
Start: 2021-08-11 | End: 2021-08-11 | Stop reason: HOSPADM

## 2021-08-11 RX ORDER — DIPHENHYDRAMINE HCL 25 MG
25 CAPSULE ORAL
Status: CANCELLED
Start: 2021-08-11

## 2021-08-11 RX ORDER — HEPARIN SODIUM,PORCINE 10 UNIT/ML
5 VIAL (ML) INTRAVENOUS
Status: DISCONTINUED | OUTPATIENT
Start: 2021-08-11 | End: 2021-08-11 | Stop reason: HOSPADM

## 2021-08-11 RX ORDER — HEPARIN SODIUM (PORCINE) LOCK FLUSH IV SOLN 100 UNIT/ML 100 UNIT/ML
5 SOLUTION INTRAVENOUS
Status: DISCONTINUED | OUTPATIENT
Start: 2021-08-11 | End: 2021-08-11 | Stop reason: HOSPADM

## 2021-08-11 RX ADMIN — MORPHINE SULFATE 2 MG: 2 INJECTION, SOLUTION INTRAMUSCULAR; INTRAVENOUS at 14:34

## 2021-08-11 RX ADMIN — MORPHINE SULFATE 2 MG: 2 INJECTION, SOLUTION INTRAMUSCULAR; INTRAVENOUS at 15:36

## 2021-08-11 RX ADMIN — HEPARIN 5 ML: 100 SYRINGE at 15:39

## 2021-08-11 RX ADMIN — MORPHINE SULFATE 2 MG: 2 INJECTION, SOLUTION INTRAMUSCULAR; INTRAVENOUS at 13:34

## 2021-08-11 RX ADMIN — DEXTROSE AND SODIUM CHLORIDE 500 ML: 5; 450 INJECTION, SOLUTION INTRAVENOUS at 13:30

## 2021-08-11 NOTE — PROGRESS NOTES
Infusion Nursing Note:  Jennifer Cervantes presents today for IVF and pain meds.    Patient seen by provider today: No   present during visit today: Not Applicable.    Note: N/A.      Intravenous Access:  Implanted Port.    Treatment Conditions:  Not Applicable.      Post Infusion Assessment:  Patient tolerated infusion without incident.  Blood return noted pre and post infusion.  Access discontinued per protocol.       Discharge Plan:   Patient discharged in stable condition accompanied by: self.  Departure Mode: Ambulatory.      DUSTIN MARTINEZ RN

## 2021-08-12 ENCOUNTER — HOSPITAL ENCOUNTER (EMERGENCY)
Facility: CLINIC | Age: 21
Discharge: HOME | End: 2021-08-12
Attending: EMERGENCY MEDICINE | Admitting: EMERGENCY MEDICINE
Payer: COMMERCIAL

## 2021-08-12 VITALS
HEIGHT: 64 IN | WEIGHT: 165 LBS | HEART RATE: 101 BPM | BODY MASS INDEX: 28.17 KG/M2 | TEMPERATURE: 98 F | SYSTOLIC BLOOD PRESSURE: 116 MMHG | DIASTOLIC BLOOD PRESSURE: 70 MMHG | OXYGEN SATURATION: 93 % | RESPIRATION RATE: 16 BRPM

## 2021-08-12 DIAGNOSIS — D57.00 SICKLE CELL PAIN CRISIS (H): ICD-10-CM

## 2021-08-12 LAB
BASOPHILS # BLD AUTO: 0.3 10E3/UL (ref 0–0.2)
BASOPHILS NFR BLD AUTO: 2 %
EOSINOPHIL # BLD AUTO: 1.2 10E3/UL (ref 0–0.7)
EOSINOPHIL NFR BLD AUTO: 10 %
ERYTHROCYTE [DISTWIDTH] IN BLOOD BY AUTOMATED COUNT: 22.7 % (ref 10–15)
HCT VFR BLD AUTO: 22.9 % (ref 35–47)
HGB BLD-MCNC: 7.9 G/DL (ref 11.7–15.7)
HOLD SPECIMEN: NORMAL
IMM GRANULOCYTES # BLD: 0 10E3/UL
IMM GRANULOCYTES NFR BLD: 0 %
LYMPHOCYTES # BLD AUTO: 2.9 10E3/UL (ref 0.8–5.3)
LYMPHOCYTES NFR BLD AUTO: 25 %
MCH RBC QN AUTO: 33.3 PG (ref 26.5–33)
MCHC RBC AUTO-ENTMCNC: 34.5 G/DL (ref 31.5–36.5)
MCV RBC AUTO: 97 FL (ref 78–100)
MONOCYTES # BLD AUTO: 1 10E3/UL (ref 0–1.3)
MONOCYTES NFR BLD AUTO: 8 %
NEUTROPHILS # BLD AUTO: 6.5 10E3/UL (ref 1.6–8.3)
NEUTROPHILS NFR BLD AUTO: 55 %
NRBC # BLD AUTO: 0.5 10E3/UL
NRBC BLD AUTO-RTO: 4 /100
PLATELET # BLD AUTO: 269 10E3/UL (ref 150–450)
RBC # BLD AUTO: 2.37 10E6/UL (ref 3.8–5.2)
RETICS # AUTO: 0.59 10E6/UL (ref 0.03–0.1)
RETICS/RBC NFR AUTO: 25 % (ref 0.5–2)
WBC # BLD AUTO: 11.9 10E3/UL (ref 4–11)

## 2021-08-12 PROCEDURE — 99285 EMERGENCY DEPT VISIT HI MDM: CPT | Mod: 25 | Performed by: EMERGENCY MEDICINE

## 2021-08-12 PROCEDURE — 99285 EMERGENCY DEPT VISIT HI MDM: CPT | Performed by: EMERGENCY MEDICINE

## 2021-08-12 PROCEDURE — 250N000011 HC RX IP 250 OP 636: Performed by: EMERGENCY MEDICINE

## 2021-08-12 PROCEDURE — 85045 AUTOMATED RETICULOCYTE COUNT: CPT | Performed by: EMERGENCY MEDICINE

## 2021-08-12 PROCEDURE — 96375 TX/PRO/DX INJ NEW DRUG ADDON: CPT | Performed by: EMERGENCY MEDICINE

## 2021-08-12 PROCEDURE — 85025 COMPLETE CBC W/AUTO DIFF WBC: CPT | Performed by: EMERGENCY MEDICINE

## 2021-08-12 PROCEDURE — 96376 TX/PRO/DX INJ SAME DRUG ADON: CPT | Performed by: EMERGENCY MEDICINE

## 2021-08-12 PROCEDURE — 96374 THER/PROPH/DIAG INJ IV PUSH: CPT | Performed by: EMERGENCY MEDICINE

## 2021-08-12 PROCEDURE — 96361 HYDRATE IV INFUSION ADD-ON: CPT | Performed by: EMERGENCY MEDICINE

## 2021-08-12 PROCEDURE — 258N000003 HC RX IP 258 OP 636: Performed by: EMERGENCY MEDICINE

## 2021-08-12 PROCEDURE — 36415 COLL VENOUS BLD VENIPUNCTURE: CPT | Performed by: EMERGENCY MEDICINE

## 2021-08-12 RX ORDER — SODIUM CHLORIDE 9 MG/ML
1000 INJECTION, SOLUTION INTRAVENOUS CONTINUOUS
Status: DISCONTINUED | OUTPATIENT
Start: 2021-08-12 | End: 2021-08-12 | Stop reason: HOSPADM

## 2021-08-12 RX ORDER — KETOROLAC TROMETHAMINE 15 MG/ML
15 INJECTION, SOLUTION INTRAMUSCULAR; INTRAVENOUS ONCE
Status: COMPLETED | OUTPATIENT
Start: 2021-08-12 | End: 2021-08-12

## 2021-08-12 RX ORDER — ONDANSETRON 2 MG/ML
4 INJECTION INTRAMUSCULAR; INTRAVENOUS EVERY 6 HOURS PRN
Status: DISCONTINUED | OUTPATIENT
Start: 2021-08-12 | End: 2021-08-12 | Stop reason: HOSPADM

## 2021-08-12 RX ORDER — HEPARIN SODIUM (PORCINE) LOCK FLUSH IV SOLN 100 UNIT/ML 100 UNIT/ML
5-10 SOLUTION INTRAVENOUS
Status: DISCONTINUED | OUTPATIENT
Start: 2021-08-12 | End: 2021-08-12 | Stop reason: HOSPADM

## 2021-08-12 RX ORDER — MORPHINE SULFATE 4 MG/ML
2 INJECTION, SOLUTION INTRAMUSCULAR; INTRAVENOUS
Status: COMPLETED | OUTPATIENT
Start: 2021-08-12 | End: 2021-08-12

## 2021-08-12 RX ADMIN — MORPHINE SULFATE 2 MG: 4 INJECTION INTRAVENOUS at 07:36

## 2021-08-12 RX ADMIN — KETOROLAC TROMETHAMINE 15 MG: 15 INJECTION, SOLUTION INTRAMUSCULAR; INTRAVENOUS at 05:24

## 2021-08-12 RX ADMIN — SODIUM CHLORIDE 1000 ML: 9 INJECTION, SOLUTION INTRAVENOUS at 05:47

## 2021-08-12 RX ADMIN — MORPHINE SULFATE 2 MG: 4 INJECTION INTRAVENOUS at 06:38

## 2021-08-12 RX ADMIN — MORPHINE SULFATE 2 MG: 4 INJECTION INTRAVENOUS at 05:24

## 2021-08-12 RX ADMIN — Medication 5 ML: at 07:44

## 2021-08-12 ASSESSMENT — MIFFLIN-ST. JEOR: SCORE: 1493.44

## 2021-08-12 NOTE — ED PROVIDER NOTES
ED Provider Note  [facility]      History     Chief Complaint   Patient presents with     Sickle Cell Pain Crisis     HPI  Jennifer Cervantes is a 21 year old female w/ PMH of sickle cell disease and crisis, superficial venous thrombosis of right arm, cerebral infarction, acute respiratory failure with hypoxia, multiple subsegmental pulmonary emboli without acute cor pulmonale, oppositional defiant behavior and panic disorder who presents to the ED today complaining of sickle cell pain crisis.  Is been going on all day.  She has been taking her pain meds at home without relief.  Nausea, vomiting, fevers.  Pain is in her back and extremities which is typical for her pain crisis.  Denies chest pain, shortness of breath or fevers.  No cough.  There is no change from prior pain crises.    Examination: NM LUNG SCAN PERFUSION PARTICULATE, 8/7/2021 1:20 PM   Indication: History of PE, positive D-dimer, chest pain   Comparison: Chest x-ray 8/7/2021, nuclear medicine PET scan 7/13/2021                                                        Impression:  Bilateral segmental and subsegmental defects, with new defect of the central posterior left lung compared to 7/13/2021, likely acute on chronic pulmonary emboli. Anemic blood pool. Multiple nonenlarged mesenteric and retroperitoneal lymph nodes.  [Urgent Result: Acute on chronic pulmonary emboli]    Past Medical History  Past Medical History:   Diagnosis Date     Anxiety      Bleeding disorder (H)      Blood clotting disorder (H)      Cerebral infarction (H) 2015     Cognitive developmental delay     low IQ. 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 (H)     right hand contractures     Iron overload due to repeated red blood cell transfusions      Migraines      Multiple subsegmental pulmonary emboli without acute cor  pulmonale (H) 02/01/2021     Oppositional defiant behavior      Superficial venous thrombosis of arm, right 03/25/2021     Uncomplicated asthma      Past Surgical History:   Procedure Laterality Date     AS INSERT TUNNELED CV 2 CATH W/O PORT/PUMP       C BREAST REDUCTION (INCLUDES LIPO) TIER 3 Bilateral 04/23/2019     CHOLECYSTECTOMY       INSERT PORT VASCULAR ACCESS Left 4/21/2021    Procedure: INSERTION, VASCULAR ACCESS PORT (NOT SURE ON SIDE UNTIL REMOVAL);  Surgeon: Rajan More MD;  Location: [facility] OR     IR CHEST PORT PLACEMENT > 5 YRS OF AGE  4/21/2021     IR CVC NON TUNNEL LINE REMOVAL  5/6/2021     IR CVC NON TUNNEL PLACEMENT  04/07/2020     IR CVC NON TUNNEL PLACEMENT  4/30/2021     IR PORT REMOVAL LEFT  4/21/2021     REMOVE PORT VASCULAR ACCESS Left 4/21/2021    Procedure: REMOVAL, VASCULAR ACCESS PORT LEFT;  Surgeon: Rajan More MD;  Location: [facility] OR     REPAIR TENDON ELBOW Right 10/02/2019    Procedure: Right Elbow Flexor Lengthening, Flexor Pronator Slide Of Wrist and Finger, Thumb Adductor Lengthening;  Surgeon: Anai Franco MD;  [facility]     TONSILLECTOMY Bilateral 10/02/2019    Procedure: Bilateral Tonsillectomy;  Surgeon: Farhana Guy MD;  [facility]     acetaminophen (TYLENOL) 325 MG tablet  albuterol (PROAIR HFA/PROVENTIL HFA/VENTOLIN HFA) 108 (90 Base) MCG/ACT inhaler  albuterol (PROVENTIL) (2.5 MG/3ML) 0.083% neb solution  ARIPiprazole (ABILIFY) 2 MG tablet  aspirin (ASA) 81 MG chewable tablet  budesonide-formoterol (SYMBICORT) 160-4.5 MCG/ACT Inhaler  dabigatran ANTICOAGULANT (PRADAXA) 150 MG capsule  diclofenac (VOLTAREN) 1 % topical gel  diphenhydrAMINE (BENADRYL) 25 MG capsule  EPINEPHrine (ANY BX GENERIC EQUIV) 0.3 MG/0.3ML injection 2-pack  Hydroxyurea 1000 MG TABS  hydrOXYzine (ATARAX) 25 MG tablet  JADENU 360 MG tablet  lidocaine-prilocaine (EMLA) 2.5-2.5 % external cream  medroxyPROGESTERone (DEPO-PROVERA) 150 MG/ML IM injection  naloxone  (NARCAN) 4 MG/0.1ML nasal spray  ondansetron (ZOFRAN) 8 MG tablet  oxyCODONE (OXYCONTIN) 10 MG 12 hr tablet  oxyCODONE IR (ROXICODONE) 15 MG tablet  sertraline (ZOLOFT) 100 MG tablet      Allergies   Allergen Reactions     Contrast Dye      Hives and breathing issues     Fish-Derived Products Hives     Seafood Hives     Diagnostic X-Ray Materials      Gadolinium      Family History  Family History   Problem Relation Age of Onset     Sickle Cell Trait Mother      Hypertension Mother      Asthma Mother      Sickle Cell Trait Father      Social History   Social History     Tobacco Use     Smoking status: Never Smoker     Smokeless tobacco: Never Used   Substance Use Topics     Alcohol use: Not Currently     Alcohol/week: 0.0 standard drinks     Drug use: Never      Past medical history, past surgical history, medications, allergies, family history, and social history were reviewed with the patient. No additional pertinent items.       Review of Systems  A complete review of systems was performed with pertinent positives and negatives noted in the HPI, and all other systems negative.    Physical Exam      Physical Exam  Physical Exam   Constitutional: oriented to person, place, and time. appears well-developed and well-nourished.   HENT:   Head: Normocephalic and atraumatic.   Neck: Normal range of motion. No tenderness of the C-spine.  No nuchal rigidity.  Pulmonary/Chest: Effort normal. No respiratory distress. Clear lungs bilaterally.  Cardiac: No murmurs, rubs, gallops. RRR.  Abdominal: Abdomen soft, nontender, nondistended. No rebound tenderness.  MSK: Long bones without deformity or evidence of trauma.  No obvious swelling of the extremities.  No tenderness over the upper and lower extremities.  No tremors of the thoracic or lumbar spine.  Neurological: alert and oriented to person, place, and time. Moves all extremities.  Cranial nerves II through XII intact.  Skin: Skin is warm and dry.   Psychiatric:  normal  mood and affect.  behavior is normal. Thought content normal.     ED Course      Procedures            No results found for any visits on 08/12/21.  Medications - No data to display     Assessments & Plan (with Medical Decision Making)   MDM  Patient presented with sickle cell pain crisis.  She has no chest pain, shortness of breath, hypoxia or fever suggestive of acute chest.  She says her pain is quite difficult.  She is denying chest pain to myself.  Pain meds given as soon as she was roomed.  Will reassess.    Re eval: Patient's pain is better and resolved after 2 rounds of pain medications.  She would like to be discharged.  Recommended following up with hematology.    I have reviewed the nursing notes. I have reviewed the findings, diagnosis, plan and need for follow up with the patient.    New Prescriptions    No medications on file       Final diagnoses:   Sickle cell pain crisis (H)       --  Andrew Chou MD    [facility] EMERGENCY DEPARTMENT  8/12/2021     Andrew Chou MD  08/12/21 0790

## 2021-08-12 NOTE — ED TRIAGE NOTES
Pt presents to the ER with Sickle Cell Pain. Pt states that its all over her body today. Pt states its been going on for a couple days.

## 2021-08-12 NOTE — PROGRESS NOTES
This is a recent snapshot of the patient's [facility] medical record.  For current drug dose and complete information and questions, call [phone] or In Basket pool, fv home infusion ([id number])  CSN Number:  [id number]

## 2021-08-13 ENCOUNTER — INFUSION THERAPY VISIT (OUTPATIENT)
Dept: ONCOLOGY | Facility: CLINIC | Age: 21
End: 2021-08-13
Attending: PEDIATRICS
Payer: COMMERCIAL

## 2021-08-13 ENCOUNTER — ANCILLARY PROCEDURE (OUTPATIENT)
Dept: ULTRASOUND IMAGING | Facility: CLINIC | Age: 21
End: 2021-08-13
Attending: PHYSICIAN ASSISTANT
Payer: COMMERCIAL

## 2021-08-13 VITALS
TEMPERATURE: 98.1 F | SYSTOLIC BLOOD PRESSURE: 126 MMHG | OXYGEN SATURATION: 93 % | RESPIRATION RATE: 18 BRPM | HEART RATE: 104 BPM | DIASTOLIC BLOOD PRESSURE: 85 MMHG

## 2021-08-13 DIAGNOSIS — D57.00 SICKLE CELL PAIN CRISIS (H): Primary | ICD-10-CM

## 2021-08-13 DIAGNOSIS — M79.89 LEFT LEG SWELLING: ICD-10-CM

## 2021-08-13 DIAGNOSIS — D57.1 HB-SS DISEASE WITHOUT CRISIS (H): ICD-10-CM

## 2021-08-13 PROCEDURE — 96361 HYDRATE IV INFUSION ADD-ON: CPT

## 2021-08-13 PROCEDURE — 250N000011 HC RX IP 250 OP 636: Performed by: PEDIATRICS

## 2021-08-13 PROCEDURE — 93971 EXTREMITY STUDY: CPT | Mod: LT | Performed by: RADIOLOGY

## 2021-08-13 PROCEDURE — 258N000003 HC RX IP 258 OP 636: Performed by: PEDIATRICS

## 2021-08-13 PROCEDURE — 96374 THER/PROPH/DIAG INJ IV PUSH: CPT

## 2021-08-13 PROCEDURE — 96376 TX/PRO/DX INJ SAME DRUG ADON: CPT

## 2021-08-13 RX ORDER — DIPHENHYDRAMINE HCL 25 MG
25 CAPSULE ORAL
Status: CANCELLED
Start: 2021-08-13

## 2021-08-13 RX ORDER — ONDANSETRON 8 MG/1
8 TABLET, FILM COATED ORAL
Status: CANCELLED
Start: 2021-08-13

## 2021-08-13 RX ORDER — MORPHINE SULFATE 2 MG/ML
2 INJECTION, SOLUTION INTRAMUSCULAR; INTRAVENOUS
Status: CANCELLED
Start: 2021-08-13

## 2021-08-13 RX ORDER — HEPARIN SODIUM (PORCINE) LOCK FLUSH IV SOLN 100 UNIT/ML 100 UNIT/ML
5 SOLUTION INTRAVENOUS
Status: CANCELLED | OUTPATIENT
Start: 2021-08-13

## 2021-08-13 RX ORDER — HEPARIN SODIUM,PORCINE 10 UNIT/ML
5 VIAL (ML) INTRAVENOUS
Status: DISCONTINUED | OUTPATIENT
Start: 2021-08-13 | End: 2021-08-13 | Stop reason: HOSPADM

## 2021-08-13 RX ORDER — HEPARIN SODIUM (PORCINE) LOCK FLUSH IV SOLN 100 UNIT/ML 100 UNIT/ML
5 SOLUTION INTRAVENOUS
Status: DISCONTINUED | OUTPATIENT
Start: 2021-08-13 | End: 2021-08-13 | Stop reason: HOSPADM

## 2021-08-13 RX ORDER — HEPARIN SODIUM,PORCINE 10 UNIT/ML
5 VIAL (ML) INTRAVENOUS
Status: CANCELLED | OUTPATIENT
Start: 2021-08-13

## 2021-08-13 RX ORDER — MORPHINE SULFATE 2 MG/ML
2 INJECTION, SOLUTION INTRAMUSCULAR; INTRAVENOUS
Status: DISCONTINUED | OUTPATIENT
Start: 2021-08-13 | End: 2021-08-13 | Stop reason: HOSPADM

## 2021-08-13 RX ADMIN — MORPHINE SULFATE 2 MG: 2 INJECTION, SOLUTION INTRAMUSCULAR; INTRAVENOUS at 12:43

## 2021-08-13 RX ADMIN — MORPHINE SULFATE 2 MG: 2 INJECTION, SOLUTION INTRAMUSCULAR; INTRAVENOUS at 11:18

## 2021-08-13 RX ADMIN — Medication 5 ML: at 12:55

## 2021-08-13 RX ADMIN — MORPHINE SULFATE 2 MG: 2 INJECTION, SOLUTION INTRAMUSCULAR; INTRAVENOUS at 10:06

## 2021-08-13 RX ADMIN — DEXTROSE AND SODIUM CHLORIDE 500 ML: 5; 450 INJECTION, SOLUTION INTRAVENOUS at 09:26

## 2021-08-13 ASSESSMENT — PAIN SCALES - GENERAL: PAINLEVEL: EXTREME PAIN (8)

## 2021-08-13 NOTE — PROGRESS NOTES
This is a recent snapshot of the patient's [facility] medical record.  For current drug dose and complete information and questions, call [phone] or In Basket pool, fv home infusion ([id number])  CSN Number:  [id number]

## 2021-08-13 NOTE — PATIENT INSTRUCTIONS
Contact numbers:    Triage/[phone]    Call with chills and/or temperature greater than or equal to 100.5 and questions or concerns.    If after hours, weekends, or holidays, call main hospital  at  [phone] and ask for Oncology doctor on call.            1    Admission  12:10 AM   Cornelia Malloy MD   [facility] Emergency Department   (Discharge: 2021) 2    ONC INFUSION 2 HR (120 MIN)   7:30 AM   (120 min.)    ONC INFUSION NURSE   [facility]    RETURN  11:15 AM   (30 min.)   Eric Duncan MD   [facility] 3    Admission  12:35 AM   Jamee Martin MD   [facility] Department   (Discharge: 8/3/2021) 4    BMT INFUSION 2 HR (120 MIN)   1:00 PM   (120 min.)    BMT INFUSION NURSE   [facility] [place] 5    Admission  12:32 AM   Andrew Chou MD   [facility] Department   (Discharge: 2021) 6    Admission   1:26 AM   Huang Domingo MD   [facility]   (Discharge: 2021) 7    Admission  12:51 AM   Andrew Chou MD   [facility] Department   (Discharge: 2021)    XR CHEST 2 VIEWS   1:20 AM   (20 min.)   [email or web address]   [facility]    NM LUNG SCAN PERF PARTICULATE  11:20 AM   (30 min.)   UUNM3   [facility]   8    Admission   1:31 AM   Andrew Chou MD   [facility] Department   (Discharge: 2021) 9    INFUSION 2 HR (120 MIN)   1:30 PM   (120 min.)   [facility] BD 01   [facility]fusion [facility] 10    Admission   3:15 AM   Court Ring MD   [facility] Emergency Department   (Discharge: 8/10/2021) 11    INFUSION 2 HR (120 MIN)   1:00 PM   (120 min.)   [facility] CH 01     [facility]fusion Services [facility] 12    Admission   4:51 AM   [facility] Emergency Department   (Discharge: 8/12/2021) 13    US LWR EXT VENOUS DUPLEX LEFT   8:30 AM   (30 min.)   [email or web address]   [facility] US [place]    ONC INFUSION 2 HR (120 MIN)   9:30 AM   (120 min.)   [facility] ONC INFUSION NURSE   [facility] 14       15     16    HAND INITIAL   8:45 AM   (60 min.)   Franchesca Tucker OT   [facility] [place]    VIDEO VISIT RETURN  10:15 AM   (30 min.)   Suraj Case MD   [facility] [place]    LAB CENTRAL  11:15 AM   (15 min.)   [facility] DRAW   [facility]    RETURN  11:45 AM   (30 min.)   Eric Duncan MD   [facility] 17     18    MR ABDOMEN WO   2:00 PM   (45 min.)   [email or web address]   [facility] 19     20     21       22     23     24     25    LAB CENTRAL   6:30 AM   (15 min.)    [facility] DRAW   [facility]    ONC INFUSION 3 HR (180 MIN)   7:00 AM   (180 min.)   [facility] ONC INFUSION NURSE   [facility] NURSE VISIT  10:00 AM   (30 min.)   Nurse, [facility]   [facility] [place]    RETURN  12:45 PM   (60 min.)   Katherine Pierce MD   [facility] 26     27    VIDEO VISIT NEW   9:00 AM   (40 min.)   Eric Gentile MD   [facility] 28 29 30 31 September 2021 Sunday Monday Tuesday Wednesday Thursday Friday Saturday                  1     2     3     4       5     6     7     8     9     10     11       12     13     14     15     16     17     18       19     20    LAB CENTRAL  11:15 AM   (15 min.)    [facility] DRAW   [facility]    RETURN  11:45 AM   (30  min.)   Eric Duncan MD   [facility] 21     22    ONC INFUSION 3 HR (180 MIN)   9:00 AM   (180 min.)    ONC INFUSION NURSE   [facility] 23     24     25       26     27     28     29     30                               Lab Results:  No results found for this or any previous visit (from the past 12 hour(s)).

## 2021-08-13 NOTE — PROGRESS NOTES
Infusion Nursing Note:  Jennifer Cervantes presents for IVF/pain medications.    Note: pt feeling 'okay' today, states her pain is the same, about an 8 in her back and legs, denies any SOB or chest pain, denies fevers/chills.    Morphine given X3 per orders, after last dose pain down to a 3 and pt feels well enough to go home. Pt has follow up scheduled Monday with Dr. Garg    Pain: see above    Treatment Conditions:  Not Applicable.    Intravenous Access:  Implanted Port.    Post Infusion Assessment:  Patient tolerated infusion without incident.  Blood return noted pre and post infusion.  No evidence of extravasations.  Access discontinued per protocol.    Discharge Plan:   Patient declined prescription refills.  Discharge instructions reviewed with: Patient.  Patient and/or family verbalized understanding of discharge instructions and all questions answered.  Copy of AVS reviewed with patient and/or family.  Patient will return 8/16 for next appointment.  Patient discharged in stable condition accompanied by: self.    Afshan Diop, RN, RN

## 2021-08-15 ENCOUNTER — APPOINTMENT (OUTPATIENT)
Dept: GENERAL RADIOLOGY | Facility: CLINIC | Age: 21
End: 2021-08-15
Attending: EMERGENCY MEDICINE
Payer: COMMERCIAL

## 2021-08-15 ENCOUNTER — HOSPITAL ENCOUNTER (EMERGENCY)
Facility: CLINIC | Age: 21
Discharge: HOME | End: 2021-08-15
Attending: EMERGENCY MEDICINE | Admitting: EMERGENCY MEDICINE
Payer: COMMERCIAL

## 2021-08-15 VITALS
HEART RATE: 110 BPM | RESPIRATION RATE: 16 BRPM | DIASTOLIC BLOOD PRESSURE: 54 MMHG | TEMPERATURE: 98.1 F | BODY MASS INDEX: 29.02 KG/M2 | OXYGEN SATURATION: 95 % | SYSTOLIC BLOOD PRESSURE: 102 MMHG | HEIGHT: 64 IN | WEIGHT: 170 LBS

## 2021-08-15 DIAGNOSIS — D57.00 SICKLE CELL PAIN CRISIS (H): ICD-10-CM

## 2021-08-15 LAB
ALBUMIN SERPL-MCNC: 4.1 G/DL (ref 3.4–5)
ALP SERPL-CCNC: 83 U/L (ref 40–150)
ALT SERPL W P-5'-P-CCNC: 87 U/L (ref 0–50)
ANION GAP SERPL CALCULATED.3IONS-SCNC: 8 MMOL/L (ref 3–14)
AST SERPL W P-5'-P-CCNC: 89 U/L (ref 0–45)
BASOPHILS # BLD AUTO: 0.2 10E3/UL (ref 0–0.2)
BASOPHILS NFR BLD AUTO: 2 %
BILIRUB SERPL-MCNC: 3.2 MG/DL (ref 0.2–1.3)
BUN SERPL-MCNC: 6 MG/DL (ref 7–30)
CALCIUM SERPL-MCNC: 8.5 MG/DL (ref 8.5–10.1)
CHLORIDE BLD-SCNC: 109 MMOL/L (ref 94–109)
CO2 SERPL-SCNC: 21 MMOL/L (ref 20–32)
CREAT SERPL-MCNC: 0.56 MG/DL (ref 0.52–1.04)
EOSINOPHIL # BLD AUTO: 0.8 10E3/UL (ref 0–0.7)
EOSINOPHIL NFR BLD AUTO: 6 %
ERYTHROCYTE [DISTWIDTH] IN BLOOD BY AUTOMATED COUNT: 22.4 % (ref 10–15)
GFR SERPL CREATININE-BSD FRML MDRD: >90 ML/MIN/1.73M2
GLUCOSE BLD-MCNC: 114 MG/DL (ref 70–99)
HCT VFR BLD AUTO: 22.6 % (ref 35–47)
HGB BLD-MCNC: 8.2 G/DL (ref 11.7–15.7)
HOLD SPECIMEN: NORMAL
HOLD SPECIMEN: NORMAL
IMM GRANULOCYTES # BLD: 0.1 10E3/UL
IMM GRANULOCYTES NFR BLD: 1 %
LYMPHOCYTES # BLD AUTO: 3.2 10E3/UL (ref 0.8–5.3)
LYMPHOCYTES NFR BLD AUTO: 23 %
MCH RBC QN AUTO: 34.2 PG (ref 26.5–33)
MCHC RBC AUTO-ENTMCNC: 36.3 G/DL (ref 31.5–36.5)
MCV RBC AUTO: 94 FL (ref 78–100)
MONOCYTES # BLD AUTO: 1.1 10E3/UL (ref 0–1.3)
MONOCYTES NFR BLD AUTO: 8 %
NEUTROPHILS # BLD AUTO: 8.9 10E3/UL (ref 1.6–8.3)
NEUTROPHILS NFR BLD AUTO: 60 %
NRBC # BLD AUTO: 0.3 10E3/UL
NRBC BLD AUTO-RTO: 2 /100
PLATELET # BLD AUTO: 244 10E3/UL (ref 150–450)
POTASSIUM BLD-SCNC: 3.5 MMOL/L (ref 3.4–5.3)
PROT SERPL-MCNC: 8.3 G/DL (ref 6.8–8.8)
RBC # BLD AUTO: 2.4 10E6/UL (ref 3.8–5.2)
RETICS # AUTO: 0.53 10E6/UL (ref 0.03–0.1)
RETICS/RBC NFR AUTO: 22.1 % (ref 0.5–2)
SODIUM SERPL-SCNC: 138 MMOL/L (ref 133–144)
WBC # BLD AUTO: 14.3 10E3/UL (ref 4–11)

## 2021-08-15 PROCEDURE — 96374 THER/PROPH/DIAG INJ IV PUSH: CPT | Performed by: EMERGENCY MEDICINE

## 2021-08-15 PROCEDURE — 258N000001 HC RX 258: Performed by: EMERGENCY MEDICINE

## 2021-08-15 PROCEDURE — 71046 X-RAY EXAM CHEST 2 VIEWS: CPT

## 2021-08-15 PROCEDURE — 71046 X-RAY EXAM CHEST 2 VIEWS: CPT | Mod: 26 | Performed by: RADIOLOGY

## 2021-08-15 PROCEDURE — 96375 TX/PRO/DX INJ NEW DRUG ADDON: CPT | Performed by: EMERGENCY MEDICINE

## 2021-08-15 PROCEDURE — 99285 EMERGENCY DEPT VISIT HI MDM: CPT | Mod: 25 | Performed by: EMERGENCY MEDICINE

## 2021-08-15 PROCEDURE — 85025 COMPLETE CBC W/AUTO DIFF WBC: CPT | Performed by: EMERGENCY MEDICINE

## 2021-08-15 PROCEDURE — 85045 AUTOMATED RETICULOCYTE COUNT: CPT | Performed by: EMERGENCY MEDICINE

## 2021-08-15 PROCEDURE — 250N000011 HC RX IP 250 OP 636: Performed by: EMERGENCY MEDICINE

## 2021-08-15 PROCEDURE — 80053 COMPREHEN METABOLIC PANEL: CPT | Performed by: EMERGENCY MEDICINE

## 2021-08-15 PROCEDURE — 96361 HYDRATE IV INFUSION ADD-ON: CPT | Performed by: EMERGENCY MEDICINE

## 2021-08-15 PROCEDURE — 36415 COLL VENOUS BLD VENIPUNCTURE: CPT | Performed by: EMERGENCY MEDICINE

## 2021-08-15 PROCEDURE — 99284 EMERGENCY DEPT VISIT MOD MDM: CPT | Performed by: EMERGENCY MEDICINE

## 2021-08-15 RX ORDER — MORPHINE SULFATE 2 MG/ML
2 INJECTION, SOLUTION INTRAMUSCULAR; INTRAVENOUS
Status: COMPLETED | OUTPATIENT
Start: 2021-08-15 | End: 2021-08-15

## 2021-08-15 RX ORDER — ONDANSETRON 2 MG/ML
4 INJECTION INTRAMUSCULAR; INTRAVENOUS EVERY 30 MIN PRN
Status: DISCONTINUED | OUTPATIENT
Start: 2021-08-15 | End: 2021-08-15 | Stop reason: HOSPADM

## 2021-08-15 RX ORDER — HEPARIN SODIUM (PORCINE) LOCK FLUSH IV SOLN 100 UNIT/ML 100 UNIT/ML
100 SOLUTION INTRAVENOUS ONCE
Status: COMPLETED | OUTPATIENT
Start: 2021-08-15 | End: 2021-08-15

## 2021-08-15 RX ORDER — KETOROLAC TROMETHAMINE 30 MG/ML
30 INJECTION, SOLUTION INTRAMUSCULAR; INTRAVENOUS ONCE
Status: COMPLETED | OUTPATIENT
Start: 2021-08-15 | End: 2021-08-15

## 2021-08-15 RX ADMIN — MORPHINE SULFATE 2 MG: 2 INJECTION, SOLUTION INTRAMUSCULAR; INTRAVENOUS at 02:15

## 2021-08-15 RX ADMIN — ONDANSETRON 4 MG: 2 INJECTION INTRAMUSCULAR; INTRAVENOUS at 02:14

## 2021-08-15 RX ADMIN — KETOROLAC TROMETHAMINE 30 MG: 30 INJECTION, SOLUTION INTRAMUSCULAR; INTRAVENOUS at 02:41

## 2021-08-15 RX ADMIN — MORPHINE SULFATE 2 MG: 2 INJECTION, SOLUTION INTRAMUSCULAR; INTRAVENOUS at 03:28

## 2021-08-15 RX ADMIN — MORPHINE SULFATE 2 MG: 2 INJECTION, SOLUTION INTRAMUSCULAR; INTRAVENOUS at 05:33

## 2021-08-15 RX ADMIN — SODIUM CHLORIDE 1000 ML: 4.5 INJECTION, SOLUTION INTRAVENOUS at 02:41

## 2021-08-15 RX ADMIN — Medication 100 UNITS: at 06:14

## 2021-08-15 ASSESSMENT — MIFFLIN-ST. JEOR: SCORE: 1516.11

## 2021-08-15 NOTE — ED PROVIDER NOTES
[place] EMERGENCY DEPARTMENT ([facility])  August 15, 2021  History   No chief complaint on file.    HPI  Jennifer Cervantes is a 21 year old female with PMH significant for sickle cell disease, superficial venous thrombosis of right arm, cerebral infarction, acute respiratory failure with hypoxia, multiple subsegmental pulmonary emboli without acute cor pulmonale, ODD and panic disorder who ***    Per review of the chart, patient has visited the [facility] ED 4 times in the past week all related to sickle cell pain crisis.  Patient also has recent LLE US negative for DVT and lung scan revealing likely acute on chronic pulmonary emboli.    LEFT LOWER EXTREMITY DUPLEX VENOUS ULTRASOUND 8/13/2021 8:36 AM  IMPRESSION: No deep venous thrombosis demonstrated in the LEFT leg.    NM LUNG SCAN PERFUSION PARTICULATE, 8/7/2021 1:20 PM   Impression:  Bilateral segmental and subsegmental defects, with new defect of the central posterior left lung compared to 7/13/2021, likely acute on chronic pulmonary emboli. Anemic blood pool. Multiple nonenlarged mesenteric and retroperitoneal lymph nodes.    PAST MEDICAL HISTORY:   Past Medical History:   Diagnosis Date     Anxiety      Bleeding disorder (H)      Blood clotting disorder (H)      Cerebral infarction (H) 2015     Cognitive developmental delay     low IQ. 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 (H)     right hand contractures     Iron overload due to repeated red blood cell transfusions      Migraines      Multiple subsegmental pulmonary emboli without acute cor pulmonale (H) 02/01/2021     Oppositional defiant behavior      Superficial venous thrombosis of arm, right 03/25/2021     Uncomplicated asthma        PAST SURGICAL HISTORY:   Past Surgical History:   Procedure Laterality Date     AS INSERT TUNNELED CV 2 CATH W/O PORT/PUMP       C  "BREAST REDUCTION (INCLUDES LIPO) TIER 3 Bilateral 04/23/2019     CHOLECYSTECTOMY       INSERT PORT VASCULAR ACCESS Left 4/21/2021    Procedure: INSERTION, VASCULAR ACCESS PORT (NOT SURE ON SIDE UNTIL REMOVAL);  Surgeon: Rajan More MD;  Location: [facility] OR     IR CHEST PORT PLACEMENT > 5 YRS OF AGE  4/21/2021     IR CVC NON TUNNEL LINE REMOVAL  5/6/2021     IR CVC NON TUNNEL PLACEMENT  04/07/2020     IR CVC NON TUNNEL PLACEMENT  4/30/2021     IR PORT REMOVAL LEFT  4/21/2021     REMOVE PORT VASCULAR ACCESS Left 4/21/2021    Procedure: REMOVAL, VASCULAR ACCESS PORT LEFT;  Surgeon: Rajan More MD;  Location: [facility] OR     REPAIR TENDON ELBOW Right 10/02/2019    Procedure: Right Elbow Flexor Lengthening, Flexor Pronator Slide Of Wrist and Finger, Thumb Adductor Lengthening;  Surgeon: Anai Franco MD;  [facility]     TONSILLECTOMY Bilateral 10/02/2019    Procedure: Bilateral Tonsillectomy;  Surgeon: Farhana Guy MD;  [facility]       Past medical history, past surgical history, medications, and allergies were reviewed with the patient. Additional pertinent items: {NONE:082848::\"None\"}    FAMILY HISTORY:   Family History   Problem Relation Age of Onset     Sickle Cell Trait Mother      Hypertension Mother      Asthma Mother      Sickle Cell Trait Father        SOCIAL HISTORY:   Social History     Tobacco Use     Smoking status: Never Smoker     Smokeless tobacco: Never Used   Substance Use Topics     Alcohol use: Not Currently     Alcohol/week: 0.0 standard drinks     Social history was reviewed with the patient. Additional pertinent items: {NONE:482766::\"None\"}      Patient's Medications   New Prescriptions    No medications on file   Previous Medications    ACETAMINOPHEN (TYLENOL) 325 MG TABLET    Take 2 tablets (650 mg) by mouth every 6 hours as needed for mild pain    ALBUTEROL (PROAIR HFA/PROVENTIL HFA/VENTOLIN HFA) 108 (90 BASE) MCG/ACT INHALER    Inhale 2 puffs into the lungs " every 6 hours as needed for shortness of breath / dyspnea or wheezing    ALBUTEROL (PROVENTIL) (2.5 MG/3ML) 0.083% NEB SOLUTION    Take 1 vial (2.5 mg) by nebulization every 6 hours as needed for shortness of breath / dyspnea or wheezing    ARIPIPRAZOLE (ABILIFY) 2 MG TABLET    Take 1 tablet (2 mg) by mouth daily    ASPIRIN (ASA) 81 MG CHEWABLE TABLET    Take 1 tablet (81 mg) by mouth daily    BUDESONIDE-FORMOTEROL (SYMBICORT) 160-4.5 MCG/ACT INHALER    Inhale 1 puff into the lungs every evening    DABIGATRAN ANTICOAGULANT (PRADAXA) 150 MG CAPSULE    Take 1 capsule (150 mg) by mouth 2 times daily Store in original 's bottle or blister pack; use within 120 days of opening.    DICLOFENAC (VOLTAREN) 1 % TOPICAL GEL    Apply 4 g topically 4 times daily as needed for moderate pain Apply to back, legs, and arms for pain    DIPHENHYDRAMINE (BENADRYL) 25 MG CAPSULE    Take 1-2 capsules (25-50 mg) by mouth nightly as needed for sleep    EPINEPHRINE (ANY BX GENERIC EQUIV) 0.3 MG/0.3ML INJECTION 2-PACK    Inject 0.3 mLs (0.3 mg) into the muscle as needed for anaphylaxis    HYDROXYUREA 1000 MG TABS    Take 2,000 mg by mouth daily    HYDROXYZINE (ATARAX) 25 MG TABLET    Take 1 tablet (25 mg) by mouth 3 times daily as needed for anxiety    JADENU 360 MG TABLET    Take 4 tablets (1,440 mg) by mouth every evening    LIDOCAINE-PRILOCAINE (EMLA) 2.5-2.5 % EXTERNAL CREAM    Apply topically as needed for moderate pain    MEDROXYPROGESTERONE (DEPO-PROVERA) 150 MG/ML IM INJECTION    Inject 150 mg into the muscle    NALOXONE (NARCAN) 4 MG/0.1ML NASAL SPRAY    Spray 1 spray (4 mg) into one nostril alternating nostrils once as needed for opioid reversal every 2-3 minutes until assistance arrives    ONDANSETRON (ZOFRAN) 8 MG TABLET    Take 8 mg by mouth every 8 hours as needed     OXYCODONE (OXYCONTIN) 10 MG 12 HR TABLET    Take 1 tablet (10 mg) by mouth every 12 hours    OXYCODONE IR (ROXICODONE) 15 MG TABLET    Take 1 tablet  "(15mg) by mouth every 4-6 hours as needed for severe pain. Goal 4 per day. Max 6 per day.    SERTRALINE (ZOLOFT) 100 MG TABLET    Take 2 tablets (200 mg) by mouth daily   Modified Medications    No medications on file   Discontinued Medications    No medications on file          Allergies   Allergen Reactions     Contrast Dye      Hives and breathing issues     Fish-Derived Products Hives     Seafood Hives     Diagnostic X-Ray Materials      Gadolinium         Review of Systems   10 point review of symptoms was performed and is negative except as noted above.     Physical Exam          Physical Exam  GEN: Well appearing, non toxic, cooperative and conversant.   HEENT: The head is normocephalic and atraumatic. Pupils are equal round and reactive to light. Extraocular motions are intact. There is no facial swelling. The neck is nontender and supple.   CV: Regular rate and rhythm without murmurs rubs or gallops. 2+ radial pulses bilaterally.  PULM: Clear to auscultation bilaterally.  ABD: Soft, nontender, nondistended.   EXT: Full range of motion.  No edema.  NEURO: Cranial nerves II through XII are intact and symmetric. Bilateral upper and lower extremities grossly show full range of motion without any focal deficits.   SKIN: No rashes, ecchymosis, or lacerations  PSYCH: Calm and cooperative, interactive.   ***  ED Course        Procedures       {EKG done?:462343::\" \"}  {ED Course Selections:147506}   {ed addendum:556043::\" \"}  {trauma activation or Fall?:159087::\" \"}  {Sepsis/Septic Shock/Stemi/Stroke:123648::\" \"}       No results found for this or any previous visit (from the past 24 hour(s)).  Medications - No data to display          Assessments & Plan (with Medical Decision Making)   ***    I have reviewed the nursing notes.    I have reviewed the findings, diagnosis, plan and need for follow up with the patient.    New Prescriptions    No medications on file       Final diagnoses:   None     I, Korey De La Paz, " am serving as a trained medical scribe to document services personally performed by Deo Schmid MD, based on the provider's statements to me.     I, Deo Schmid MD, was physically present and have reviewed and verified the accuracy of this note documented by Korey De La Paz.    Deo Euceda MD    8/15/2021   [facility] EMERGENCY DEPARTMENT

## 2021-08-15 NOTE — ED PROVIDER NOTES
Recommended Tylenol and Zofran.    Vendor EMERGENCY DEPARTMENT ([facility])  August 15, 2021  History     Chief Complaint   Patient presents with     Sickle Cell Pain Crisis     HPI  Jennifer Cervantes is a 21 year old female with PMH significant for sickle cell disease, superficial venous thrombosis of right arm, cerebral infarction, acute respiratory failure with hypoxia, multiple subsegmental pulmonary emboli without acute cor pulmonale, ODD and panic disorder who came into the ED with a complaint of lower back pain, bilateral arm pain (left worse than right), and bilateral leg pain that is consistent with previous pain crisis episodes. States that while this pain is similar, it is worse than recent pain crisis episodes and was non-response to home pain regimen. Denies fever, nausea, vomiting, chest pain, shortness of breath, or abdominal pain.    Per review of the chart, patient has visited the [facility] ED 4 times in the past week all related to sickle cell pain crisis.  Patient also has recent LLE US negative for DVT and lung scan revealing likely acute on chronic pulmonary emboli.    LEFT LOWER EXTREMITY DUPLEX VENOUS ULTRASOUND 8/13/2021 8:36 AM  IMPRESSION: No deep venous thrombosis demonstrated in the LEFT leg.    NM LUNG SCAN PERFUSION PARTICULATE, 8/7/2021 1:20 PM   Impression:  Bilateral segmental and subsegmental defects, with new defect of the central posterior left lung compared to 7/13/2021, likely acute on chronic pulmonary emboli. Anemic blood pool. Multiple nonenlarged mesenteric and retroperitoneal lymph nodes.    PAST MEDICAL HISTORY:   Past Medical History:   Diagnosis Date     Anxiety      Bleeding disorder (H)      Blood clotting disorder (H)      Cerebral infarction (H) 2015     Cognitive developmental delay     low IQ. 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  (H)     right hand contractures     Iron overload due to repeated red blood cell transfusions      Migraines      Multiple subsegmental pulmonary emboli without acute cor pulmonale (H) 02/01/2021     Oppositional defiant behavior      Superficial venous thrombosis of arm, right 03/25/2021     Uncomplicated asthma        PAST SURGICAL HISTORY:   Past Surgical History:   Procedure Laterality Date     AS INSERT TUNNELED CV 2 CATH W/O PORT/PUMP       C BREAST REDUCTION (INCLUDES LIPO) TIER 3 Bilateral 04/23/2019     CHOLECYSTECTOMY       INSERT PORT VASCULAR ACCESS Left 4/21/2021    Procedure: INSERTION, VASCULAR ACCESS PORT (NOT SURE ON SIDE UNTIL REMOVAL);  Surgeon: Rajan More MD;  Location: [facility] OR     IR CHEST PORT PLACEMENT > 5 YRS OF AGE  4/21/2021     IR CVC NON TUNNEL LINE REMOVAL  5/6/2021     IR CVC NON TUNNEL PLACEMENT  04/07/2020     IR CVC NON TUNNEL PLACEMENT  4/30/2021     IR PORT REMOVAL LEFT  4/21/2021     REMOVE PORT VASCULAR ACCESS Left 4/21/2021    Procedure: REMOVAL, VASCULAR ACCESS PORT LEFT;  Surgeon: Rajan More MD;  Location: [facility] OR     REPAIR TENDON ELBOW Right 10/02/2019    Procedure: Right Elbow Flexor Lengthening, Flexor Pronator Slide Of Wrist and Finger, Thumb Adductor Lengthening;  Surgeon: Anai Franco MD;  Location: [facility] OR     TONSILLECTOMY Bilateral 10/02/2019    Procedure: Bilateral Tonsillectomy;  Surgeon: Farhana Guy MD;  Location: [facility] OR       Past medical history, past surgical history, medications, and allergies were reviewed with the patient. Additional pertinent items: None    FAMILY HISTORY:   Family History   Problem Relation Age of Onset     Sickle Cell Trait Mother      Hypertension Mother      Asthma Mother      Sickle Cell Trait Father        SOCIAL HISTORY:   Social History     Tobacco Use     Smoking status: Never Smoker     Smokeless tobacco: Never Used   Substance Use Topics     Alcohol use: Not Currently     Alcohol/week: 0.0 standard  drinks     Social history was reviewed with the patient. Additional pertinent items: None      Patient's Medications   New Prescriptions    No medications on file   Previous Medications    ACETAMINOPHEN (TYLENOL) 325 MG TABLET    Take 2 tablets (650 mg) by mouth every 6 hours as needed for mild pain    ALBUTEROL (PROAIR HFA/PROVENTIL HFA/VENTOLIN HFA) 108 (90 BASE) MCG/ACT INHALER    Inhale 2 puffs into the lungs every 6 hours as needed for shortness of breath / dyspnea or wheezing    ALBUTEROL (PROVENTIL) (2.5 MG/3ML) 0.083% NEB SOLUTION    Take 1 vial (2.5 mg) by nebulization every 6 hours as needed for shortness of breath / dyspnea or wheezing    ARIPIPRAZOLE (ABILIFY) 2 MG TABLET    Take 1 tablet (2 mg) by mouth daily    ASPIRIN (ASA) 81 MG CHEWABLE TABLET    Take 1 tablet (81 mg) by mouth daily    BUDESONIDE-FORMOTEROL (SYMBICORT) 160-4.5 MCG/ACT INHALER    Inhale 1 puff into the lungs every evening    DABIGATRAN ANTICOAGULANT (PRADAXA) 150 MG CAPSULE    Take 1 capsule (150 mg) by mouth 2 times daily Store in original 's bottle or blister pack; use within 120 days of opening.    DICLOFENAC (VOLTAREN) 1 % TOPICAL GEL    Apply 4 g topically 4 times daily as needed for moderate pain Apply to back, legs, and arms for pain    DIPHENHYDRAMINE (BENADRYL) 25 MG CAPSULE    Take 1-2 capsules (25-50 mg) by mouth nightly as needed for sleep    EPINEPHRINE (ANY BX GENERIC EQUIV) 0.3 MG/0.3ML INJECTION 2-PACK    Inject 0.3 mLs (0.3 mg) into the muscle as needed for anaphylaxis    HYDROXYUREA 1000 MG TABS    Take 2,000 mg by mouth daily    HYDROXYZINE (ATARAX) 25 MG TABLET    Take 1 tablet (25 mg) by mouth 3 times daily as needed for anxiety    JADENU 360 MG TABLET    Take 4 tablets (1,440 mg) by mouth every evening    LIDOCAINE-PRILOCAINE (EMLA) 2.5-2.5 % EXTERNAL CREAM    Apply topically as needed for moderate pain    MEDROXYPROGESTERONE (DEPO-PROVERA) 150 MG/ML IM INJECTION    Inject 150 mg into the muscle     "NALOXONE (NARCAN) 4 MG/0.1ML NASAL SPRAY    Spray 1 spray (4 mg) into one nostril alternating nostrils once as needed for opioid reversal every 2-3 minutes until assistance arrives    ONDANSETRON (ZOFRAN) 8 MG TABLET    Take 8 mg by mouth every 8 hours as needed     OXYCODONE (OXYCONTIN) 10 MG 12 HR TABLET    Take 1 tablet (10 mg) by mouth every 12 hours    OXYCODONE IR (ROXICODONE) 15 MG TABLET    Take 1 tablet (15mg) by mouth every 4-6 hours as needed for severe pain. Goal 4 per day. Max 6 per day.    SERTRALINE (ZOLOFT) 100 MG TABLET    Take 2 tablets (200 mg) by mouth daily   Modified Medications    No medications on file   Discontinued Medications    No medications on file          Allergies   Allergen Reactions     Contrast Dye      Hives and breathing issues     Fish-Derived Products Hives     Seafood Hives     Diagnostic X-Ray Materials      Gadolinium         Review of Systems   10 point review of symptoms was performed and is negative except as noted above.     Physical Exam   BP: (!) 156/96  Pulse: (!) 130  Temp: 98.3  F (36.8  C)  Resp: 22  Height: 162.6 cm (5' 4\")  Weight: 77.1 kg (170 lb)  SpO2: 94 %      Physical Exam  GEN: Well appearing, non toxic, cooperative and conversant.   HEENT: The head is normocephalic and atraumatic. Pupils are equal round and reactive to light. Extraocular motions are intact. There is no facial swelling. The neck is nontender and supple.   CV: Regular rate and rhythm without murmurs rubs or gallops. 2+ radial pulses bilaterally. Port present in upper left chest.  PULM: Clear to auscultation bilaterally.  ABD: Soft, nontender, nondistended.   EXT: Full range of motion.  No edema.  NEURO: Cranial nerves II through XII are intact and symmetric. Bilateral upper and lower extremities grossly show full range of motion without any focal deficits.   SKIN: No rashes, ecchymosis, or lacerations  PSYCH: Calm and cooperative, interactive.     ED Course        Procedures          The " medical record was reviewed and interpreted.  Current labs reviewed and interpreted.  Previous labs reviewed and interpreted.                 Results for orders placed or performed during the hospital encounter of 08/15/21 (from the past 24 hour(s))   CBC with platelets differential    Narrative    The following orders were created for panel order CBC with platelets differential.  Procedure                               Abnormality         Status                     ---------                               -----------         ------                     CBC with platelets and d...[[id number]]  Abnormal            Final result                 Please view results for these tests on the individual orders.   Comprehensive metabolic panel   Result Value Ref Range    Sodium 138 133 - 144 mmol/L    Potassium 3.5 3.4 - 5.3 mmol/L    Chloride 109 94 - 109 mmol/L    Carbon Dioxide (CO2) 21 20 - 32 mmol/L    Anion Gap 8 3 - 14 mmol/L    Urea Nitrogen 6 (L) 7 - 30 mg/dL    Creatinine 0.56 0.52 - 1.04 mg/dL    Calcium 8.5 8.5 - 10.1 mg/dL    Glucose 114 (H) 70 - 99 mg/dL    Alkaline Phosphatase 83 40 - 150 U/L    AST 89 (H) 0 - 45 U/L    ALT 87 (H) 0 - 50 U/L    Protein Total 8.3 6.8 - 8.8 g/dL    Albumin 4.1 3.4 - 5.0 g/dL    Bilirubin Total 3.2 (H) 0.2 - 1.3 mg/dL    GFR Estimate >90 >60 mL/min/1.73m2   Reticulocyte count   Result Value Ref Range    % Reticulocyte 22.1 (H) 0.5 - 2.0 %    Absolute Reticulocyte 0.530 (H) 0.025 - 0.095 10e6/uL   [place] Draw    Narrative    The following orders were created for panel order [place] Draw.  Procedure                               Abnormality         Status                     ---------                               -----------         ------                     Extra Blue Top Tube[[id number]]                              Final result               Extra Green Top (Lithium...[[id number]]                                                 Extra Purple Top Tube[[id number]]                             Final result                 Please view results for these tests on the individual orders.   CBC with platelets and differential   Result Value Ref Range    WBC Count 14.3 (H) 4.0 - 11.0 10e3/uL    RBC Count 2.40 (L) 3.80 - 5.20 10e6/uL    Hemoglobin 8.2 (L) 11.7 - 15.7 g/dL    Hematocrit 22.6 (L) 35.0 - 47.0 %    MCV 94 78 - 100 fL    MCH 34.2 (H) 26.5 - 33.0 pg    MCHC 36.3 31.5 - 36.5 g/dL    RDW 22.4 (H) 10.0 - 15.0 %    Platelet Count 244 150 - 450 10e3/uL    % Neutrophils 60 %    % Lymphocytes 23 %    % Monocytes 8 %    % Eosinophils 6 %    % Basophils 2 %    % Immature Granulocytes 1 %    NRBCs per 100 WBC 2 (H) <1 /100    Absolute Neutrophils 8.9 (H) 1.6 - 8.3 10e3/uL    Absolute Lymphocytes 3.2 0.8 - 5.3 10e3/uL    Absolute Monocytes 1.1 0.0 - 1.3 10e3/uL    Absolute Eosinophils 0.8 (H) 0.0 - 0.7 10e3/uL    Absolute Basophils 0.2 0.0 - 0.2 10e3/uL    Absolute Immature Granulocytes 0.1 (H) <=0.0 10e3/uL    Absolute NRBCs 0.3 10e3/uL   Extra Blue Top Tube   Result Value Ref Range    Hold Specimen JIC    Extra Purple Top Tube   Result Value Ref Range    Hold Specimen JIC    XR Chest 2 Views    Narrative    EXAM: XR CHEST 2 VW  LOCATION: [facility]  DATE/TIME: 8/15/2021 2:28 AM    INDICATION: Sickle cell pain crisis. Pain.  COMPARISON: 08/07/2021      Impression    IMPRESSION: Left chest port catheter tip near cavoatrial junction. Mild central bronchial wall thickening, left greater than right. No focal airspace infiltrate. No pneumothorax or pleural effusion. Right upper quadrant surgical clips.     Medications   ondansetron (ZOFRAN) injection 4 mg (4 mg Intravenous Given 8/15/21 0214)   sodium chloride 0.45% BOLUS (0 mLs Intravenous Stopped 8/15/21 0458)   morphine (PF) injection 2 mg (2 mg Intravenous Given 8/15/21 9397)   ketorolac (TORADOL) injection 30 mg (30 mg Intravenous Given 8/15/21 6795)             Assessments & Plan (with Medical Decision Making)    21 year old female with PMH significant for sickle cell disease that came to the ED with symptoms consistent with pain crisis, reporting that this episode is worse than the previous episodes.     DDx includes sickle cell crisis, sepsis,, aplastic anemia, MSK pain, among other causes.    Denying any additional symptoms and physical exam only notable for lower back tenderness.  Patient received protocol for ED pain management ED with subsequent significant improvement in her symptoms.  Requesting discharge.    Labs overall unrevealing reticulocyte appropriately elevated for sickle cell crisis similar to prior.  Chest x-ray acquired due to recent PEs to look for any evidence of effusion or necrosis.  Overall the chest x-ray is unrevealing.    Patient's saturations are 95% on room air on my reevaluation  She is appropriate for outpatient follow-up with strict ED return precautions.  Patient agrees.    - Patient agrees to our plan and is ready and eager for discharge. Care plan, follow up plan, and reasons to return immediately to the ED were dicussed in detail and summarized as noted in the discharge instructions.      I have reviewed the nursing notes.    I have reviewed the findings, diagnosis, plan and need for follow up with the patient.    New Prescriptions    No medications on file       Final diagnoses:   Sickle cell pain crisis (H)     Pat Velasquez'molina, MS4  [employer]    I, Korey De La Paz, am serving as a trained medical scribe to document services personally performed by Deo Schmid MD, based on the provider's statements to me.     I, Deo Schmid MD, was physically present and have reviewed and verified the accuracy of this note documented by Korey De La Paz.    I reviewed the entire note and all documentation and made corrections as well as additions to reflect my own evaluation, which is now represents.         Deo Schmid MD    8/15/2021   [facility]  EMERGENCY DEPARTMENT     Deo Schmid MD  08/15/21 [id number]

## 2021-08-15 NOTE — ED TRIAGE NOTES
"Patient presents from home. Reports pain started yesterday morning. Diffuse pain throughout body, and joints, \"Everything.\" Reports severe pain, reports attempting heat without success. Reports taking prescribed medications without relief.     Tearful and restless in triage.       "

## 2021-08-15 NOTE — DISCHARGE INSTRUCTIONS
Please continue taking your regular medications.  Follow-up with all your appointments.    Return to the emergency department immediately for any chest pain, back pain, shortness of breath, weakness, abdominal pain nausea, vomiting, or any other concerns as given or discussed

## 2021-08-16 ENCOUNTER — APPOINTMENT (OUTPATIENT)
Dept: LAB | Facility: CLINIC | Age: 21
End: 2021-08-16
Attending: PEDIATRICS
Payer: COMMERCIAL

## 2021-08-16 ENCOUNTER — ONCOLOGY VISIT (OUTPATIENT)
Dept: ONCOLOGY | Facility: CLINIC | Age: 21
End: 2021-08-16
Attending: PEDIATRICS
Payer: COMMERCIAL

## 2021-08-16 ENCOUNTER — HOSPITAL ENCOUNTER (EMERGENCY)
Facility: CLINIC | Age: 21
Discharge: HOME | End: 2021-08-16
Attending: EMERGENCY MEDICINE | Admitting: EMERGENCY MEDICINE
Payer: COMMERCIAL

## 2021-08-16 VITALS
TEMPERATURE: 98 F | OXYGEN SATURATION: 93 % | HEART RATE: 114 BPM | BODY MASS INDEX: 29.02 KG/M2 | HEIGHT: 64 IN | WEIGHT: 170 LBS | SYSTOLIC BLOOD PRESSURE: 120 MMHG | DIASTOLIC BLOOD PRESSURE: 62 MMHG | RESPIRATION RATE: 18 BRPM

## 2021-08-16 VITALS
WEIGHT: 169 LBS | RESPIRATION RATE: 18 BRPM | DIASTOLIC BLOOD PRESSURE: 84 MMHG | HEART RATE: 120 BPM | HEIGHT: 64 IN | OXYGEN SATURATION: 93 % | TEMPERATURE: 98.6 F | BODY MASS INDEX: 28.85 KG/M2 | SYSTOLIC BLOOD PRESSURE: 134 MMHG

## 2021-08-16 DIAGNOSIS — K29.00 OTHER ACUTE GASTRITIS WITHOUT HEMORRHAGE: Primary | ICD-10-CM

## 2021-08-16 DIAGNOSIS — D57.00 SICKLE CELL PAIN CRISIS (H): ICD-10-CM

## 2021-08-16 DIAGNOSIS — D57.1 HB-SS DISEASE WITHOUT CRISIS (H): ICD-10-CM

## 2021-08-16 DIAGNOSIS — R10.12 ABDOMINAL PAIN, LEFT UPPER QUADRANT: ICD-10-CM

## 2021-08-16 LAB
ALBUMIN SERPL-MCNC: 3.9 G/DL (ref 3.4–5)
ALBUMIN SERPL-MCNC: 4 G/DL (ref 3.4–5)
ALP SERPL-CCNC: 79 U/L (ref 40–150)
ALP SERPL-CCNC: 82 U/L (ref 40–150)
ALT SERPL W P-5'-P-CCNC: 82 U/L (ref 0–50)
ALT SERPL W P-5'-P-CCNC: 87 U/L (ref 0–50)
ANION GAP SERPL CALCULATED.3IONS-SCNC: 10 MMOL/L (ref 3–14)
ANION GAP SERPL CALCULATED.3IONS-SCNC: 8 MMOL/L (ref 3–14)
AST SERPL W P-5'-P-CCNC: 84 U/L (ref 0–45)
AST SERPL W P-5'-P-CCNC: 92 U/L (ref 0–45)
BASOPHILS # BLD AUTO: 0.2 10E3/UL (ref 0–0.2)
BASOPHILS NFR BLD AUTO: 2 %
BILIRUB SERPL-MCNC: 3.3 MG/DL (ref 0.2–1.3)
BILIRUB SERPL-MCNC: 4.1 MG/DL (ref 0.2–1.3)
BUN SERPL-MCNC: 9 MG/DL (ref 7–30)
BUN SERPL-MCNC: 9 MG/DL (ref 7–30)
CALCIUM SERPL-MCNC: 8.4 MG/DL (ref 8.5–10.1)
CALCIUM SERPL-MCNC: 8.9 MG/DL (ref 8.5–10.1)
CHLORIDE BLD-SCNC: 106 MMOL/L (ref 94–109)
CHLORIDE BLD-SCNC: 110 MMOL/L (ref 94–109)
CO2 SERPL-SCNC: 22 MMOL/L (ref 20–32)
CO2 SERPL-SCNC: 23 MMOL/L (ref 20–32)
CREAT SERPL-MCNC: 0.56 MG/DL (ref 0.52–1.04)
CREAT SERPL-MCNC: 0.68 MG/DL (ref 0.52–1.04)
EOSINOPHIL # BLD AUTO: 1.5 10E3/UL (ref 0–0.7)
EOSINOPHIL NFR BLD AUTO: 11 %
ERYTHROCYTE [DISTWIDTH] IN BLOOD BY AUTOMATED COUNT: 22.5 % (ref 10–15)
GFR SERPL CREATININE-BSD FRML MDRD: >90 ML/MIN/1.73M2
GFR SERPL CREATININE-BSD FRML MDRD: >90 ML/MIN/1.73M2
GLUCOSE BLD-MCNC: 101 MG/DL (ref 70–99)
GLUCOSE BLD-MCNC: 97 MG/DL (ref 70–99)
HCT VFR BLD AUTO: 20.9 % (ref 35–47)
HGB BLD-MCNC: 7.7 G/DL (ref 11.7–15.7)
HOLD SPECIMEN: NORMAL
IMM GRANULOCYTES # BLD: 0.1 10E3/UL
IMM GRANULOCYTES NFR BLD: 1 %
LYMPHOCYTES # BLD AUTO: 2.8 10E3/UL (ref 0.8–5.3)
LYMPHOCYTES NFR BLD AUTO: 20 %
MCH RBC QN AUTO: 33.3 PG (ref 26.5–33)
MCHC RBC AUTO-ENTMCNC: 36.8 G/DL (ref 31.5–36.5)
MCV RBC AUTO: 91 FL (ref 78–100)
MONOCYTES # BLD AUTO: 1 10E3/UL (ref 0–1.3)
MONOCYTES NFR BLD AUTO: 7 %
NEUTROPHILS # BLD AUTO: 8.3 10E3/UL (ref 1.6–8.3)
NEUTROPHILS NFR BLD AUTO: 59 %
NRBC # BLD AUTO: 0.5 10E3/UL
NRBC BLD AUTO-RTO: 4 /100
PLATELET # BLD AUTO: 286 10E3/UL (ref 150–450)
POTASSIUM BLD-SCNC: 3.9 MMOL/L (ref 3.4–5.3)
POTASSIUM BLD-SCNC: 4.1 MMOL/L (ref 3.4–5.3)
PROT SERPL-MCNC: 7.6 G/DL (ref 6.8–8.8)
PROT SERPL-MCNC: 7.6 G/DL (ref 6.8–8.8)
RBC # BLD AUTO: 2.31 10E6/UL (ref 3.8–5.2)
SODIUM SERPL-SCNC: 139 MMOL/L (ref 133–144)
SODIUM SERPL-SCNC: 140 MMOL/L (ref 133–144)
WBC # BLD AUTO: 13.8 10E3/UL (ref 4–11)

## 2021-08-16 PROCEDURE — 36415 COLL VENOUS BLD VENIPUNCTURE: CPT | Performed by: EMERGENCY MEDICINE

## 2021-08-16 PROCEDURE — 250N000021 HC RX MED A9270 GY (STAT IND- M) 250: Performed by: PEDIATRICS

## 2021-08-16 PROCEDURE — 96376 TX/PRO/DX INJ SAME DRUG ADON: CPT | Performed by: EMERGENCY MEDICINE

## 2021-08-16 PROCEDURE — 90471 IMMUNIZATION ADMIN: CPT | Performed by: PEDIATRICS

## 2021-08-16 PROCEDURE — 250N000011 HC RX IP 250 OP 636: Performed by: PEDIATRICS

## 2021-08-16 PROCEDURE — 85025 COMPLETE CBC W/AUTO DIFF WBC: CPT | Performed by: PEDIATRICS

## 2021-08-16 PROCEDURE — 96375 TX/PRO/DX INJ NEW DRUG ADDON: CPT | Performed by: EMERGENCY MEDICINE

## 2021-08-16 PROCEDURE — 99285 EMERGENCY DEPT VISIT HI MDM: CPT | Mod: 25 | Performed by: EMERGENCY MEDICINE

## 2021-08-16 PROCEDURE — 96365 THER/PROPH/DIAG IV INF INIT: CPT | Performed by: EMERGENCY MEDICINE

## 2021-08-16 PROCEDURE — 96361 HYDRATE IV INFUSION ADD-ON: CPT | Performed by: EMERGENCY MEDICINE

## 2021-08-16 PROCEDURE — 84450 TRANSFERASE (AST) (SGOT): CPT | Performed by: PEDIATRICS

## 2021-08-16 PROCEDURE — 258N000003 HC RX IP 258 OP 636: Performed by: EMERGENCY MEDICINE

## 2021-08-16 PROCEDURE — 84155 ASSAY OF PROTEIN SERUM: CPT | Performed by: PEDIATRICS

## 2021-08-16 PROCEDURE — 250N000011 HC RX IP 250 OP 636: Performed by: EMERGENCY MEDICINE

## 2021-08-16 PROCEDURE — 36591 DRAW BLOOD OFF VENOUS DEVICE: CPT | Performed by: PEDIATRICS

## 2021-08-16 PROCEDURE — 82247 BILIRUBIN TOTAL: CPT | Performed by: EMERGENCY MEDICINE

## 2021-08-16 PROCEDURE — 99213 OFFICE O/P EST LOW 20 MIN: CPT | Performed by: PEDIATRICS

## 2021-08-16 PROCEDURE — G0463 HOSPITAL OUTPT CLINIC VISIT: HCPCS

## 2021-08-16 PROCEDURE — 99285 EMERGENCY DEPT VISIT HI MDM: CPT | Performed by: EMERGENCY MEDICINE

## 2021-08-16 PROCEDURE — 90734 MENACWYD/MENACWYCRM VACC IM: CPT | Performed by: PEDIATRICS

## 2021-08-16 RX ORDER — SODIUM CHLORIDE, SODIUM LACTATE, POTASSIUM CHLORIDE, CALCIUM CHLORIDE 600; 310; 30; 20 MG/100ML; MG/100ML; MG/100ML; MG/100ML
1000 INJECTION, SOLUTION INTRAVENOUS CONTINUOUS
Status: DISCONTINUED | OUTPATIENT
Start: 2021-08-16 | End: 2021-08-16 | Stop reason: HOSPADM

## 2021-08-16 RX ORDER — HEPARIN SODIUM (PORCINE) LOCK FLUSH IV SOLN 100 UNIT/ML 100 UNIT/ML
5 SOLUTION INTRAVENOUS EVERY 8 HOURS
Status: DISCONTINUED | OUTPATIENT
Start: 2021-08-16 | End: 2021-08-17 | Stop reason: HOSPADM

## 2021-08-16 RX ORDER — MORPHINE SULFATE 2 MG/ML
2 INJECTION, SOLUTION INTRAMUSCULAR; INTRAVENOUS
Status: COMPLETED | OUTPATIENT
Start: 2021-08-16 | End: 2021-08-16

## 2021-08-16 RX ORDER — HEPARIN SODIUM (PORCINE) LOCK FLUSH IV SOLN 100 UNIT/ML 100 UNIT/ML
5-10 SOLUTION INTRAVENOUS
Status: DISCONTINUED | OUTPATIENT
Start: 2021-08-16 | End: 2021-08-16 | Stop reason: HOSPADM

## 2021-08-16 RX ADMIN — Medication 5 ML: at 11:18

## 2021-08-16 RX ADMIN — SODIUM CHLORIDE, POTASSIUM CHLORIDE, SODIUM LACTATE AND CALCIUM CHLORIDE 1000 ML: 600; 310; 30; 20 INJECTION, SOLUTION INTRAVENOUS at 02:12

## 2021-08-16 RX ADMIN — MORPHINE SULFATE 2 MG: 2 INJECTION, SOLUTION INTRAMUSCULAR; INTRAVENOUS at 03:56

## 2021-08-16 RX ADMIN — HEPARIN 5 ML: 100 SYRINGE at 04:24

## 2021-08-16 RX ADMIN — FAMOTIDINE 20 MG: 20 INJECTION, SOLUTION INTRAVENOUS at 02:44

## 2021-08-16 RX ADMIN — SODIUM CHLORIDE, POTASSIUM CHLORIDE, SODIUM LACTATE AND CALCIUM CHLORIDE 1000 ML: 600; 310; 30; 20 INJECTION, SOLUTION INTRAVENOUS at 01:03

## 2021-08-16 RX ADMIN — MORPHINE SULFATE 2 MG: 2 INJECTION, SOLUTION INTRAMUSCULAR; INTRAVENOUS at 01:03

## 2021-08-16 RX ADMIN — MORPHINE SULFATE 2 MG: 2 INJECTION, SOLUTION INTRAMUSCULAR; INTRAVENOUS at 02:12

## 2021-08-16 RX ADMIN — NEISSERIA MENINGITIDIS GROUP A CAPSULAR POLYSACCHARIDE DIPHTHERIA TOXOID CONJUGATE ANTIGEN, NEISSERIA MENINGITIDIS GROUP C CAPSULAR POLYSACCHARIDE DIPHTHERIA TOXOID CONJUGATE ANTIGEN, NEISSERIA MENINGITIDIS GROUP Y CAPSULAR POLYSACCHARIDE DIPHTHERIA TOXOID CONJUGATE ANTIGEN, AND NEISSERIA MENINGITIDIS GROUP W-135 CAPSULAR POLYSACCHARIDE DIPHTHERIA TOXOID CONJUGATE ANTIGEN 0.5 ML: 4; 4; 4; 4 INJECTION, SOLUTION INTRAMUSCULAR at 12:27

## 2021-08-16 ASSESSMENT — PAIN SCALES - GENERAL: PAINLEVEL: EXTREME PAIN (8)

## 2021-08-16 ASSESSMENT — MIFFLIN-ST. JEOR
SCORE: 1511.83
SCORE: 1516.11

## 2021-08-16 NOTE — ED PROVIDER NOTES
ED Provider Note  [facility]      History     Chief Complaint   Patient presents with     Sickle Cell Pain Crisis     HPI  Jennifer Cervantes is a 21 year old female with history of sickle cell disease and multiple serious complications from that, presents to the ED today reporting sickle cell pain crisis tonight.  She reports achy pain in her low back bilaterally as well as both thighs.  She states this is similar to previous episodes of sickle cell pain crisis.  She states she is taken her home meds, took a warm bath, not feeling better.  No recent trauma.  No fever, cough, shortness of breath, vomiting, diarrhea.  No dysuria.  Again, she reports this is similar to previous episodes of pain crisis.  She additionally notes some discomfort in the left upper quadrant, which she states overall she has had for the last month.  She states is not necessarily worse today.  She has been worked up for this in the past, states that it kind of waxes and wanes.    Past Medical History  Past Medical History:   Diagnosis Date     Anxiety      Bleeding disorder (H)      Blood clotting disorder (H)      Cerebral infarction (H) 2015     Cognitive developmental delay     low IQ. 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 (H)     right hand contractures     Iron overload due to repeated red blood cell transfusions      Migraines      Multiple subsegmental pulmonary emboli without acute cor pulmonale (H) 02/01/2021     Oppositional defiant behavior      Superficial venous thrombosis of arm, right 03/25/2021     Uncomplicated asthma      Past Surgical History:   Procedure Laterality Date     AS INSERT TUNNELED CV 2 CATH W/O PORT/PUMP       C BREAST REDUCTION (INCLUDES LIPO) TIER 3 Bilateral 04/23/2019     CHOLECYSTECTOMY       INSERT PORT VASCULAR ACCESS Left 4/21/2021    Procedure:  INSERTION, VASCULAR ACCESS PORT (NOT SURE ON SIDE UNTIL REMOVAL);  Surgeon: Rajan More MD;  Location: [facility] OR     IR CHEST PORT PLACEMENT > 5 YRS OF AGE  4/21/2021     IR CVC NON TUNNEL LINE REMOVAL  5/6/2021     IR CVC NON TUNNEL PLACEMENT  04/07/2020     IR CVC NON TUNNEL PLACEMENT  4/30/2021     IR PORT REMOVAL LEFT  4/21/2021     REMOVE PORT VASCULAR ACCESS Left 4/21/2021    Procedure: REMOVAL, VASCULAR ACCESS PORT LEFT;  Surgeon: Rajan More MD;  Location: [facility] OR     REPAIR TENDON ELBOW Right 10/02/2019    Procedure: Right Elbow Flexor Lengthening, Flexor Pronator Slide Of Wrist and Finger, Thumb Adductor Lengthening;  Surgeon: Anai Franco MD;  Location: [facility] OR     TONSILLECTOMY Bilateral 10/02/2019    Procedure: Bilateral Tonsillectomy;  Surgeon: Farhana Guy MD;  Location: [facility] OR     acetaminophen (TYLENOL) 325 MG tablet  albuterol (PROAIR HFA/PROVENTIL HFA/VENTOLIN HFA) 108 (90 Base) MCG/ACT inhaler  albuterol (PROVENTIL) (2.5 MG/3ML) 0.083% neb solution  ARIPiprazole (ABILIFY) 2 MG tablet  aspirin (ASA) 81 MG chewable tablet  budesonide-formoterol (SYMBICORT) 160-4.5 MCG/ACT Inhaler  dabigatran ANTICOAGULANT (PRADAXA) 150 MG capsule  diclofenac (VOLTAREN) 1 % topical gel  diphenhydrAMINE (BENADRYL) 25 MG capsule  EPINEPHrine (ANY BX GENERIC EQUIV) 0.3 MG/0.3ML injection 2-pack  Hydroxyurea 1000 MG TABS  hydrOXYzine (ATARAX) 25 MG tablet  JADENU 360 MG tablet  lidocaine-prilocaine (EMLA) 2.5-2.5 % external cream  medroxyPROGESTERone (DEPO-PROVERA) 150 MG/ML IM injection  naloxone (NARCAN) 4 MG/0.1ML nasal spray  ondansetron (ZOFRAN) 8 MG tablet  oxyCODONE (OXYCONTIN) 10 MG 12 hr tablet  oxyCODONE IR (ROXICODONE) 15 MG tablet  sertraline (ZOLOFT) 100 MG tablet      Allergies   Allergen Reactions     Contrast Dye      Hives and breathing issues     Fish-Derived Products Hives     Seafood Hives     Diagnostic X-Ray Materials      Gadolinium      Family History  Family History  "  Problem Relation Age of Onset     Sickle Cell Trait Mother      Hypertension Mother      Asthma Mother      Sickle Cell Trait Father      Social History   Social History     Tobacco Use     Smoking status: Never Smoker     Smokeless tobacco: Never Used   Substance Use Topics     Alcohol use: Not Currently     Alcohol/week: 0.0 standard drinks     Drug use: Never      Past medical history, past surgical history, medications, allergies, family history, and social history were reviewed with the patient. No additional pertinent items.       Review of Systems  A complete review of systems was performed with pertinent positives and negatives noted in the HPI, and all other systems negative.    Physical Exam   BP: (!) 131/90  Pulse: 112  Temp: 98  F (36.7  C)  Resp: 18  Height: 162.6 cm (5' 4\")  Weight: 77.1 kg (170 lb)  SpO2: 93 %  Physical Exam  Constitutional:       General: She is not in acute distress.     Appearance: She is not diaphoretic.   HENT:      Head: Atraumatic.   Eyes:      General: No scleral icterus.  Cardiovascular:      Heart sounds: Normal heart sounds.   Pulmonary:      Effort: No respiratory distress.      Breath sounds: Normal breath sounds.   Abdominal:      Palpations: Abdomen is soft.      Tenderness: There is abdominal tenderness (mild LUQ tenderness without guarding).   Musculoskeletal:         General: No tenderness.      Comments: Foot drop on right, CMS otherwise intact in legs.   Skin:     General: Skin is warm.         ED Course      Procedures              Results for orders placed or performed during the hospital encounter of 08/16/21   Extra Blue Top Tube     Status: None   Result Value Ref Range    Hold Specimen JIC    Extra Green Top (Lithium Heparin) Tube     Status: None   Result Value Ref Range    Hold Specimen JIC    Extra Purple Top Tube     Status: None   Result Value Ref Range    Hold Specimen JIC    Comprehensive metabolic panel     Status: Abnormal   Result Value Ref Range "    Sodium 139 133 - 144 mmol/L    Potassium 4.1 3.4 - 5.3 mmol/L    Chloride 106 94 - 109 mmol/L    Carbon Dioxide (CO2) 23 20 - 32 mmol/L    Anion Gap 10 3 - 14 mmol/L    Urea Nitrogen 9 7 - 30 mg/dL    Creatinine 0.68 0.52 - 1.04 mg/dL    Calcium 8.4 (L) 8.5 - 10.1 mg/dL    Glucose 101 (H) 70 - 99 mg/dL    Alkaline Phosphatase 82 40 - 150 U/L    AST 92 (H) 0 - 45 U/L    ALT 87 (H) 0 - 50 U/L    Protein Total 7.6 6.8 - 8.8 g/dL    Albumin 3.9 3.4 - 5.0 g/dL    Bilirubin Total 3.3 (H) 0.2 - 1.3 mg/dL    GFR Estimate >90 >60 mL/min/1.73m2   [place] Draw     Status: None    Narrative    The following orders were created for panel order [place] Draw.  Procedure                               Abnormality         Status                     ---------                               -----------         ------                     Extra Blue Top Tube[[id number]]                              Final result               Extra Green Top (Lithium...[[id number]]                      Final result               Extra Purple Top Tube[[id number]]                            Final result                 Please view results for these tests on the individual orders.     Medications   lactated ringers infusion (0 mLs Intravenous Stopped 8/16/21 0413)   heparin 100 UNIT/ML injection 5-10 mL (5 mLs Intracatheter Given 8/16/21 0424)   morphine (PF) injection 2 mg (2 mg Intravenous Given 8/16/21 0356)   lactated ringers BOLUS 1,000 mL (0 mLs Intravenous Stopped 8/16/21 0215)   famotidine (PEPCID) infusion 20 mg (0 mg Intravenous Stopped 8/16/21 0412)        Assessments & Plan (with Medical Decision Making)   Patient does report that she has been having ongoing left upper quadrant pain for about a month.  I gave her some famotidine without much improvement.  I did look back at her imaging, does not like she is had any abdominal imaging over that timeframe.  I do wonder whether this could be splenic infarct or issue causing her pain.  I did speak with  radiology who reported that CT with contrast would be the test of choice to image this.  They felt that noncontrast CT and ultrasound may or may not give us any useful information.  Unfortunately the patient does have an anaphylactic history to IV contrast.  I do see that she has an MRI of the abdomen ordered in the system.  I do wonder if this would be a better test for her.  This does not seem like an emergent issues has been going on and has been unchanged for 1 month.  She does have a follow-up scheduled with her doctors later today, she is encouraged to keep that appointment, discussed this with them.    As far as her sickle cell pain, she states that this is the same type of sickle cell pain she had before.  No trauma, no infectious signs or symptoms.  I did not repeat CBC or reticulocyte count as they were done yesterday.  I did do a CMP, primarily for the creatinine in hopes that I will be able to CT her.  Again, unfortunately she does have an allergy to contrast, so did not proceed with this.  I did strongly recommend she be admitted for further evaluation, as after I reviewed her chart, I see that since discharge from the hospital 3 weeks ago, she has been seen either in the ED or in the infusion center every single day except for 1 for IV pain medications.  She declined this, stating that she feels better wants to go home.  However, expressed concerns about the fact that we do not seem to be managing her well with our current plan, and it is very clear that we need to come up with some sort of different or alternate plan for her because what were doing is not working.  She again declined.  She does have an appointment later today with her oncology clinic and I strongly recommend she discuss this further.    Dictation Disclaimer: Some of this Note has been completed with voice-recognition dictation software. Although errors are generally corrected real-time, there is the potential for a rare error to be  present in the completed chart.      I have reviewed the nursing notes. I have reviewed the findings, diagnosis, plan and need for follow up with the patient.    Discharge Medication List as of 8/16/2021  4:13 AM          Final diagnoses:   Sickle cell pain crisis (H)   Abdominal pain, left upper quadrant       --  Court [facility] EMERGENCY DEPARTMENT  8/16/2021     Court Ring MD  08/16/21 0514

## 2021-08-16 NOTE — NURSING NOTE
MENACTRA VACCINE GIVEN TO PT IN RIGHT DELTOID PER DR. STERN ORDERS. PATIENT D.O.B AND BIRTH DATE VERIFIED. VIS GIVEN. SEE ERIKA Cobian MA on 8/16/2021 at 12:40 PM

## 2021-08-16 NOTE — NURSING NOTE
Port de-accessed done on patient. Patient tolerated well without any complications. Documented in Flowsheets.    Lashanda Cobian MA

## 2021-08-16 NOTE — LETTER
"    8/16/2021         RE: Jennifer Cervantes  [address]  [facility] [address]        Dear Colleague,    Thank you for referring your patient, Jennifer Cervantes, to the [facility]. Please see a copy of my visit note below.    Sickle Cell Outpatient Follow Up Visit      Date of encounter: Aug 16, 2021    Jennifer Cervantes is a 21 year old female who is being seen in person for hospital follow up and health maintenance related to SCD      Interval History: Since our last inpatient visit, she has done a bit better. She has still gone to the ED frequently but no admissions. She said her ED experiences have been markedly better, and she really appreciated the efforts providers have taken to treat her respectfully. She continues to have low-level acute on chronic PE though it seems more likely she may have some degree of pulmonary HTN. She has been on dabigatran and denies side effects. We have held her weekend chelation for now. The daily infusions a few weeks ago seemed to keep her out of the ED most of the time and since then, she has seemed to be coming in slightly less frequently. No refills needed today.    On the hospital note, she wanted to share her side of the story from the ED and give context to the situation. (Of note, I had strongly recommended she avoid cursing and being argumentative while she was in the hospital, which she did not take kindly to, since she rightly pointed out I did not have her full side of the story). She said that from the week ~7/10-13, she had been to the ED 3 times. In these situations, particularly on 7/11-12, she had been ignored in her opinion despite needing help, and was told \"everyone was too busy\" even though she said she could see several nursing staff at the desk. She was also still dyspneic and had only found that nebulizers helped at home. In the ED, she had waited 2 hours for pain medications and was having pleurisy (later found to have new PE) but " the nurse insisted on doing an inhaler first. Jennifer insisted that the inhalers didn't work earlier in the day and nebs were the key but the nurse pushed back. At that point (and Jennifer admits to having a temper when feeling bad), she argued back with the nurse. Since she felt no one would give her a nebulizer and she could not breathe comfortably, she refused the VQ scan and went home to use her home nebulizer. This context was not documented.    She then was seen in clinic the next day and was now hypoxic and still in pain. She received morphine x2 in clinic and then went to the ED. She received morphine about 75 minutes after admission but then there were 3 hours in between at which point she received a PCA. She was still very frustrated from the days prior and had a difficult encounter with her nurse. Jennifer had been there with her mom and when Jennifer said she would reach out to her mom given some of the disrespect she felt, she overheard the nurse make a disparaging remark about her mom. This was on top of the context (which we were not privy to until much later) that her stepfather had  just a few days before, so she was very emotional. She then waited 25 hours before admission with a PCA having been started around 3.5 hours in her ED stay but she rarely saw anyone later. Given the convergence of factors, she was frustrated about being stuck in the ED and this was documented in the EMR, though there was not clear documentation that anyone asked about her frustration triggers.    Now, 2 weeks later, her dyspnea is improved and she is tolerating the dabigatran. She still wants to wean opioids but does need more Oxycontin at this point.     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 She also has significant anxiety and depression with a strong anxiety about transferring to the adult clinic. She usually has pain crises secondary to the anxiety.      --------------------------------  Plan last reviewed with patient: 7/26/2021    Patient background: 22yo F, enjoys movies and kids though there are times where she does not really want to talk to people. Does not have a lot of social support at home.     Sickle Cell Disease History  Primary Hematologist: Perla  PCP: Raul  Genotype: SS  Acute Pain Crisis Treatment:    ER/Acute Care/Infusion Clinic:   o Morphine 2 mg IVP/SC Q1H X 3 doses  o Toradol x1   o Maintenance IV fluids with LR  o Other: Zofran 8 mg IV PRN nausea (avoid Benadryl ideally--not very effective for opioid-induced pruritus)    Inpatient:  o Opioid: Morphine 2 mg IV Q1H PRN until PCA starts  o PCA plan:   - PCA button dose: Morphine 1 mg  - Lockout: 20 minutes  - Continuous Infusion: consider 1 mg/hr  - One hr max: 4 mg  o Other Medications: Zofran  o If PCA not working, she Has had success with ketamine starting at 4mg/h and advancing only to 6mg/h, as 8mg/h made her feel quite poorly.  o ASA back on 7/2021  o Supportive Care: Docusate, Senna  Chronic Pain Medications:    Home regimen Oxycontin 10 mg q12h, oxycodone 15 mg p.o. q.4-6 hours p.r.n. breakthrough pain.  She also continues on Voltaren gel, and Zoloft among other medications.    -Also benefits from mental health visits, acupuncture  Baseline Hemoglobin: 7 g/dl without chronic transfusions  Hydroxyurea use: Yes  H/O blood transfusions: Yes, several (iron overload) Most recent 7/13/2021    H/O Transfusion Reactions: no    Antibodies:none  H/O Acute Chest Syndrome: Yes    Last episode: 10/2019     ICU/intubation: unsure  H/O Stroke: Yes (managed with chronic transfusions in the past, stopped late Spring  2020)  H/O VTE: Yes (2/2021)  H/O Cholecystectomy or Splenectomy: no  H/O Asthma, Pulm HTN, AVN, Leg Ulcers, Nephropathy, Retinopathy, etc: Iron overload, asthma, chronic lung disease, physical limitations from early stroke     -------------------------------------------    Sickle Cell Disease Comprehensive Checklist    Bone Health/Avascular Necrosis Screening/Symptoms (each visit): no new concerns today    Leg Ulcer evaluation (every visit): none    Hypertension (every visit): borderline hypertensive recently but improved later in evening and previous day on most of ED visits    Last pulmonary evaluation (asthma, AMAN, pulm HTN): within last year( due again)    Stroke/silent cerebral infarct Hx (Y/N): Yes TIA ~2014, first event ~age 1 with full stroke and R sided weakness    Last PCP Visit: 8/2020    Vaccines:  o PCV13: 5/13/19  o Pneumovax (PPSV23): 3/04, 10/09, 7/12/19 (next due 7/2024)  o Menactra: 4/2010, 9/2015 (MCV done 8/16)  o Influenza: got 20-21 vaccine per self report    Audiology (chelation): done 6/2020, normal (due again)    Past Medical History:  Past Medical History:   Diagnosis Date     Anxiety      Bleeding disorder (H)      Blood clotting disorder (H)      Cerebral infarction (H) 2015     Cognitive developmental delay     low IQ. 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 (H)     right hand contractures     Iron overload due to repeated red blood cell transfusions      Migraines      Multiple subsegmental pulmonary emboli without acute cor pulmonale (H) 02/01/2021     Oppositional defiant behavior      Superficial venous thrombosis of arm, right 03/25/2021     Uncomplicated asthma        Past Surgical History:  Past Surgical History:   Procedure Laterality Date     AS INSERT TUNNELED CV 2 CATH W/O PORT/PUMP       C BREAST REDUCTION (INCLUDES LIPO) TIER 3 Bilateral  "04/23/2019     CHOLECYSTECTOMY       INSERT PORT VASCULAR ACCESS Left 4/21/2021    Procedure: INSERTION, VASCULAR ACCESS PORT (NOT SURE ON SIDE UNTIL REMOVAL);  Surgeon: Rajan More MD;  Location: [facility] OR     IR CHEST PORT PLACEMENT > 5 YRS OF AGE  4/21/2021     IR CVC NON TUNNEL LINE REMOVAL  5/6/2021     IR CVC NON TUNNEL PLACEMENT  04/07/2020     IR CVC NON TUNNEL PLACEMENT  4/30/2021     IR PORT REMOVAL LEFT  4/21/2021     REMOVE PORT VASCULAR ACCESS Left 4/21/2021    Procedure: REMOVAL, VASCULAR ACCESS PORT LEFT;  Surgeon: Rajan More MD;  Location: [facility] OR     REPAIR TENDON ELBOW Right 10/02/2019    Procedure: Right Elbow Flexor Lengthening, Flexor Pronator Slide Of Wrist and Finger, Thumb Adductor Lengthening;  Surgeon: Anai Franco MD;  Location: [facility] OR     TONSILLECTOMY Bilateral 10/02/2019    Procedure: Bilateral Tonsillectomy;  Surgeon: Farhana Guy MD;  Location: [facility] OR       Family History:   Family History   Problem Relation Age of Onset     Sickle Cell Trait Mother      Hypertension Mother      Asthma Mother      Sickle Cell Trait Father        Social History:  Social History     Socioeconomic History     Marital status: Single     Spouse name: Not on file     Number of children: Not on file     Years of education: Not on file     Highest education level: Not on file   Occupational History     Not on file   Tobacco Use     Smoking status: Never Smoker     Smokeless tobacco: Never Used   Substance and Sexual Activity     Alcohol use: Not Currently     Alcohol/week: 0.0 standard drinks     Drug use: Never     Sexual activity: Not Currently     Partners: Male     Birth control/protection: Other   Other Topics Concern     Parent/sibling w/ CABG, MI or angioplasty before 65F 55M? Not Asked   Social History Narrative    Lives with mom and extended family but \"toxic environment\" per her report. She would like to move out but cannot financially do so. She has minimal support at " home despite her significant SCD comorbidities and cognitive delay from stroke.     Social Determinants of Health     Financial Resource Strain:      Difficulty of Paying Living Expenses:    Food Insecurity:      Worried About Running Out of Food in the Last Year:      Ran Out of Food in the Last Year:    Transportation Needs:      Lack of Transportation (Medical):      Lack of Transportation (Non-Medical):    Physical Activity:      Days of Exercise per Week:      Minutes of Exercise per Session:    Stress:      Feeling of Stress :    Social Connections:      Frequency of Communication with Friends and Family:      Frequency of Social Gatherings with Friends and Family:      Attends Muslim Services:      Active Member of Clubs or Organizations:      Attends Club or Organization Meetings:      Marital Status:    Intimate Partner Violence: Not At Risk     Fear of Current or Ex-Partner: No     Emotionally Abused: No     Physically Abused: No     Sexually Abused: No       Medications:  Current Outpatient Medications   Medication     acetaminophen (TYLENOL) 325 MG tablet     albuterol (PROAIR HFA/PROVENTIL HFA/VENTOLIN HFA) 108 (90 Base) MCG/ACT inhaler     albuterol (PROVENTIL) (2.5 MG/3ML) 0.083% neb solution     ARIPiprazole (ABILIFY) 2 MG tablet     aspirin (ASA) 81 MG chewable tablet     budesonide-formoterol (SYMBICORT) 160-4.5 MCG/ACT Inhaler     dabigatran ANTICOAGULANT (PRADAXA) 150 MG capsule     diclofenac (VOLTAREN) 1 % topical gel     diphenhydrAMINE (BENADRYL) 25 MG capsule     EPINEPHrine (ANY BX GENERIC EQUIV) 0.3 MG/0.3ML injection 2-pack     Hydroxyurea 1000 MG TABS     hydrOXYzine (ATARAX) 25 MG tablet     JADENU 360 MG tablet     lidocaine-prilocaine (EMLA) 2.5-2.5 % external cream     medroxyPROGESTERone (DEPO-PROVERA) 150 MG/ML IM injection     naloxone (NARCAN) 4 MG/0.1ML nasal spray     ondansetron (ZOFRAN) 8 MG tablet     oxyCODONE (OXYCONTIN) 10 MG 12 hr tablet     oxyCODONE IR (ROXICODONE)  "15 MG tablet     sertraline (ZOLOFT) 100 MG tablet     Current Facility-Administered Medications   Medication     medroxyPROGESTERone (DEPO-PROVERA) syringe 150 mg         Physical Exam:   /84   Pulse 120   Temp 98.6  F (37  C)   Resp 18   Ht 1.626 m (5' 4.02\")   Wt 76.7 kg (169 lb)   SpO2 93%   BMI 28.99 kg/m       GEN: young  female, calm and collected today, in a good mood and quite engaged  HEENT: slight icterus, MMM  RESP: clear to auscultation  SKIN: no bruising noted  NEURO: alert and oriented, right wrist contracture stable. Gait antalgic but stable      Labs:   Results for HIMANSHU AL (MRN [id number]) as of 8/16/2021 22:11   Ref. Range 8/16/2021 11:19   Sodium Latest Ref Range: 133 - 144 mmol/L 140   Potassium Latest Ref Range: 3.4 - 5.3 mmol/L 3.9   Chloride Latest Ref Range: 94 - 109 mmol/L 110 (H)   Carbon Dioxide Latest Ref Range: 20 - 32 mmol/L 22   Urea Nitrogen Latest Ref Range: 7 - 30 mg/dL 9   Creatinine Latest Ref Range: 0.52 - 1.04 mg/dL 0.56   GFR Estimate Latest Ref Range: >60 mL/min/1.73m2 >90   Calcium Latest Ref Range: 8.5 - 10.1 mg/dL 8.9   Anion Gap Latest Ref Range: 3 - 14 mmol/L 8   Albumin Latest Ref Range: 3.4 - 5.0 g/dL 4.0   Protein Total Latest Ref Range: 6.8 - 8.8 g/dL 7.6   Bilirubin Total Latest Ref Range: 0.2 - 1.3 mg/dL 4.1 (H)   Alkaline Phosphatase Latest Ref Range: 40 - 150 U/L 79   ALT Latest Ref Range: 0 - 50 U/L 82 (H)   AST Latest Ref Range: 0 - 45 U/L 84 (H)   Glucose Latest Ref Range: 70 - 99 mg/dL 97   WBC Latest Ref Range: 4.0 - 11.0 10e3/uL 13.8 (H)   Hemoglobin Latest Ref Range: 11.7 - 15.7 g/dL 7.7 (L)   Hematocrit Latest Ref Range: 35.0 - 47.0 % 20.9 (L)   Platelet Count Latest Ref Range: 150 - 450 10e3/uL 286   RBC Count Latest Ref Range: 3.80 - 5.20 10e6/uL 2.31 (L)   MCV Latest Ref Range: 78 - 100 fL 91   MCH Latest Ref Range: 26.5 - 33.0 pg 33.3 (H)   MCHC Latest Ref Range: 31.5 - 36.5 g/dL 36.8 (H)   RDW Latest Ref Range: 10.0 " - 15.0 % 22.5 (H)   % Neutrophils Latest Units: % 59   % Lymphocytes Latest Units: % 20   % Monocytes Latest Units: % 7   % Eosinophils Latest Units: % 11   Absolute Basophils Latest Ref Range: 0.0 - 0.2 10e3/uL 0.2   % Basophils Latest Units: % 2   Absolute Eosinophils Latest Ref Range: 0.0 - 0.7 10e3/uL 1.5 (H)   Absolute Immature Granulocytes Latest Ref Range: <=0.0 10e3/uL 0.1 (H)   Absolute Lymphocytes Latest Ref Range: 0.8 - 5.3 10e3/uL 2.8   Absolute Monocytes Latest Ref Range: 0.0 - 1.3 10e3/uL 1.0   % Immature Granulocytes Latest Units: % 1   Absolute Neutrophils Latest Ref Range: 1.6 - 8.3 10e3/uL 8.3   Absolute NRBCs Latest Units: 10e3/uL 0.5   NRBCs per 100 WBC Latest Ref Range: <1 /100 4 (H)     Imaging:   Examination: NM LUNG SCAN PERFUSION PARTICULATE       Date: 8/7/2021 1:20 PM      Indication: History of PE, positive D-dimer, chest pain      Comparison: Chest x-ray 8/7/2021, nuclear medicine PET scan 7/13/2021     Technique:     The patient received 7 mCi of Tc-99m labeled MAA intravenously.  Ventilation imaging was deferred as per department policy precautions  during COVID-19 pandemic. A standard eight view lung perfusion scan  was obtained. SPECT images of the lungs were obtained. CT images of  the chest were obtained for the purposes of anatomic localization and  attenuation correction only.     Findings:     Planar images demonstrate multiple bilateral segmental and  subsegmental perfusion defects. Defect of the central posterior left  lung appears new compared to study from 7/13/2021.     Fused SPECT-CT images demonstrate no pleural effusion, pneumothorax,  or focal consolidation. Left IJ Port-A-Cath with tip in the SVC.  Cholecystectomy clips. Redemonstration of lymphadenopathy in the  visualized upper abdomen. Anemic blood pool with markedly low dense  cardiac chamber cavities.                                                                      Impression:  Bilateral segmental and  subsegmental defects, with new defect of the  central posterior left lung compared to 7/13/2021, likely acute on  chronic pulmonary emboli. Anemic blood pool. Multiple nonenlarged  mesenteric and retroperitoneal lymph nodes.        [Urgent Result: Acute on chronic pulmonary emboli]  ----------    Assessment:  Jennifer Cervantes is a 21 year old female with HbSS. Her disease has been complicated by stroke leading to some slight cognitive delay and right sided hemiparesis, high anxiety leading to frequent pain crises on top of chronic pain syndrome, poor social structure at home with no real support, and iron overload secondary to repeated transfusions.    She is doing much better today. She is calm and collected and feels better about the care in the last few weeks. Dr. Malloy was mentioned as a provider who demonstrated respect for her but regardless of the provider, she has felt respectful and prompt care has been the recent norm.    We are holding off on the chelation for now. Ferriscan due this week.    Major Topics of the Visit:  1. Pain Management: Updated pain plan. No major changes today though Benadryl was removed    -Crizanlizumab monthly (started June 2021)   2. Iron overload/Pharmacy Issues: She had Jadenu ordered a few months ago but there have been difficulties getting it at refill time. She is now on Desferal as well. LIC was 43 (3/2021) so we need to act urgently. Desferal HD over 48 hours is going well. Synthetic function for the liver seems to be intact  Ferriscan this week. Needs audiology follow up as well, last done June 2020.  3. High RBC turnover: Jennifer really has a high degree of RBC turnover, more than most patients I have seen. Oxybryta led to more frequent pain episodes (chest pain, which was new) and did not change the RBC turnover so it was stopped in December. No change currently  4. Pulmonary Emboli + new RUE DVT (innominate vein) of unclear chronicity + new symptomatic R cephalic SVT this  week: Now on dabigatran after having DVT/PE while on apixaban and rivaroxaban. Her levels have been therapeutic so it is odd that her metabolism of drugs is less predictable. We did start her back on ASA. She may have some pulmonary HTN with her recurrent chest discomfort and known PEs. Will work to get her back in to see pulmonology since she missed a visit last month  5. Stroke sequelae on right side: Follow up with  PM&R to see if they may be able to better assist with her weakness. I do not think a surgical approach is warranted at this point. This visit has not yet occurred  Follow up: plan to see Andrei Jeannette in a week    Review of inpatient care as documented above   Review of the result(s) of each unique test - labs as above  Diagnosis or treatment significantly limited by social determinants of health - sickle cell disease, racial inequity, difficult social situation at home  Ordering of specific tests      Eric Duncan MD  Hematologist  Division of Hematology, Oncology, and Transplantation  [facility]  [facility]  Pager: [phone]              Again, thank you for allowing me to participate in the care of your patient.        Sincerely,        Eric Duncan MD

## 2021-08-16 NOTE — NURSING NOTE
Chief Complaint   Patient presents with     Port Draw     power needle. heparin locked, vitals checked     Elena Mcghee RN on 8/16/2021 at 11:21 AM

## 2021-08-16 NOTE — NURSING NOTE
"Oncology Rooming Note    August 16, 2021 11:50 AM   Jennifer Cervantes is a 21 year old female who presents for:    Chief Complaint   Patient presents with     Port Draw     power needle. heparin locked, vitals checked     Oncology Clinic Visit     [facility] RETURN - SICKLE CELL ANEMIA     Initial Vitals: /84   Pulse 120   Temp 98.6  F (37  C)   Resp 18   Ht 1.626 m (5' 4.02\")   Wt 76.7 kg (169 lb)   SpO2 93%   BMI 28.99 kg/m   Estimated body mass index is 28.99 kg/m  as calculated from the following:    Height as of this encounter: 1.626 m (5' 4.02\").    Weight as of this encounter: 76.7 kg (169 lb). Body surface area is 1.86 meters squared.  Extreme Pain (8) Comment: Data Unavailable   No LMP recorded. Patient has had an injection.  Allergies reviewed: Yes  Medications reviewed: Yes    Medications: Medication refills not needed today.  Pharmacy name entered into kajeet:    [facility] - [place], [place] - [address]  [id number] [address] SEC OF [facility] [place] [phone]  [id number] [address] AT [id number] [address] AT [address] OF [address] [facility] - [place], [place] - [address]    Clinical concerns: No new concerns. Perla was notified.      Shahriar Sheldon LPN            "

## 2021-08-16 NOTE — LETTER
"    8/16/2021         RE: Jennifer Cervantes  [address]  [facility] [address]      Sickle Cell Outpatient Follow Up Visit      Date of encounter: Aug 16, 2021    Jennifer Cervantes is a 21 year old female who is being seen in person for hospital follow up and health maintenance related to SCD      Interval History: Since our last inpatient visit, she has done a bit better. She has still gone to the ED frequently but no admissions. She said her ED experiences have been markedly better, and she really appreciated the efforts providers have taken to treat her respectfully. She continues to have low-level acute on chronic PE though it seems more likely she may have some degree of pulmonary HTN. She has been on dabigatran and denies side effects. We have held her weekend chelation for now. The daily infusions a few weeks ago seemed to keep her out of the ED most of the time and since then, she has seemed to be coming in slightly less frequently. No refills needed today.    On the hospital note, she wanted to share her side of the story from the ED and give context to the situation. (Of note, I had strongly recommended she avoid cursing and being argumentative while she was in the hospital, which she did not take kindly to, since she rightly pointed out I did not have her full side of the story). She said that from the week ~7/10-13, she had been to the ED 3 times. In these situations, particularly on 7/11-12, she had been ignored in her opinion despite needing help, and was told \"everyone was too busy\" even though she said she could see several nursing staff at the desk. She was also still dyspneic and had only found that nebulizers helped at home. In the ED, she had waited 2 hours for pain medications and was having pleurisy (later found to have new PE) but the nurse insisted on doing an inhaler first. Jennifer insisted that the inhalers didn't work earlier in the day and nebs were the key but the nurse pushed back. At " that point (and Jennifer admits to having a temper when feeling bad), she argued back with the nurse. Since she felt no one would give her a nebulizer and she could not breathe comfortably, she refused the VQ scan and went home to use her home nebulizer. This context was not documented.    She then was seen in clinic the next day and was now hypoxic and still in pain. She received morphine x2 in clinic and then went to the ED. She received morphine about 75 minutes after admission but then there were 3 hours in between at which point she received a PCA. She was still very frustrated from the days prior and had a difficult encounter with her nurse. Jennifer had been there with her mom and when Jennifer said she would reach out to her mom given some of the disrespect she felt, she overheard the nurse make a disparaging remark about her mom. This was on top of the context (which we were not privy to until much later) that her stepfather had  just a few days before, so she was very emotional. She then waited 25 hours before admission with a PCA having been started around 3.5 hours in her ED stay but she rarely saw anyone later. Given the convergence of factors, she was frustrated about being stuck in the ED and this was documented in the EMR, though there was not clear documentation that anyone asked about her frustration triggers.    Now, 2 weeks later, her dyspnea is improved and she is tolerating the dabigatran. She still wants to wean opioids but does need more Oxycontin at this point.     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 She also has significant anxiety and depression with a  strong anxiety about transferring to the adult clinic. She usually has pain crises secondary to the anxiety.      --------------------------------  Plan last reviewed with patient: 7/26/2021    Patient background: 22yo F, enjoys movies and kids though there are times where she does not really want to talk to people. Does not have a lot of social support at home.     Sickle Cell Disease History  Primary Hematologist: Perla  PCP: Raul  Genotype: SS  Acute Pain Crisis Treatment:    ER/Acute Care/Infusion Clinic:   o Morphine 2 mg IVP/SC Q1H X 3 doses  o Toradol x1   o Maintenance IV fluids with LR  o Other: Zofran 8 mg IV PRN nausea (avoid Benadryl ideally--not very effective for opioid-induced pruritus)    Inpatient:  o Opioid: Morphine 2 mg IV Q1H PRN until PCA starts  o PCA plan:   - PCA button dose: Morphine 1 mg  - Lockout: 20 minutes  - Continuous Infusion: consider 1 mg/hr  - One hr max: 4 mg  o Other Medications: Zofran  o If PCA not working, she Has had success with ketamine starting at 4mg/h and advancing only to 6mg/h, as 8mg/h made her feel quite poorly.  o ASA back on 7/2021  o Supportive Care: Docusate, Senna  Chronic Pain Medications:    Home regimen Oxycontin 10 mg q12h, oxycodone 15 mg p.o. q.4-6 hours p.r.n. breakthrough pain.  She also continues on Voltaren gel, and Zoloft among other medications.    -Also benefits from mental health visits, acupuncture  Baseline Hemoglobin: 7 g/dl without chronic transfusions  Hydroxyurea use: Yes  H/O blood transfusions: Yes, several (iron overload) Most recent 7/13/2021    H/O Transfusion Reactions: no    Antibodies:none  H/O Acute Chest Syndrome: Yes    Last episode: 10/2019     ICU/intubation: unsure  H/O Stroke: Yes (managed with chronic transfusions in the past, stopped late Spring 2020)  H/O VTE: Yes (2/2021)  H/O Cholecystectomy or Splenectomy: no  H/O Asthma, Pulm HTN, AVN, Leg Ulcers, Nephropathy, Retinopathy, etc: Iron overload, asthma, chronic  lung disease, physical limitations from early stroke     -------------------------------------------    Sickle Cell Disease Comprehensive Checklist    Bone Health/Avascular Necrosis Screening/Symptoms (each visit): no new concerns today    Leg Ulcer evaluation (every visit): none    Hypertension (every visit): borderline hypertensive recently but improved later in evening and previous day on most of ED visits    Last pulmonary evaluation (asthma, AMAN, pulm HTN): within last year( due again)    Stroke/silent cerebral infarct Hx (Y/N): Yes TIA ~2014, first event ~age 1 with full stroke and R sided weakness    Last PCP Visit: 8/2020    Vaccines:  o PCV13: 5/13/19  o Pneumovax (PPSV23): 3/04, 10/09, 7/12/19 (next due 7/2024)  o Menactra: 4/2010, 9/2015 (MCV done 8/16)  o Influenza: got 20-21 vaccine per self report    Audiology (chelation): done 6/2020, normal (due again)    Past Medical History:  Past Medical History:   Diagnosis Date     Anxiety      Bleeding disorder (H)      Blood clotting disorder (H)      Cerebral infarction (H) 2015     Cognitive developmental delay     low IQ. 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 (H)     right hand contractures     Iron overload due to repeated red blood cell transfusions      Migraines      Multiple subsegmental pulmonary emboli without acute cor pulmonale (H) 02/01/2021     Oppositional defiant behavior      Superficial venous thrombosis of arm, right 03/25/2021     Uncomplicated asthma        Past Surgical History:  Past Surgical History:   Procedure Laterality Date     AS INSERT TUNNELED CV 2 CATH W/O PORT/PUMP       C BREAST REDUCTION (INCLUDES LIPO) TIER 3 Bilateral 04/23/2019     CHOLECYSTECTOMY       INSERT PORT VASCULAR ACCESS Left 4/21/2021    Procedure: INSERTION, VASCULAR ACCESS PORT (NOT SURE ON SIDE UNTIL REMOVAL);  Surgeon:  "Rajan oMre MD;  Location: [facility] OR     IR CHEST PORT PLACEMENT > 5 YRS OF AGE  4/21/2021     IR CVC NON TUNNEL LINE REMOVAL  5/6/2021     IR CVC NON TUNNEL PLACEMENT  04/07/2020     IR CVC NON TUNNEL PLACEMENT  4/30/2021     IR PORT REMOVAL LEFT  4/21/2021     REMOVE PORT VASCULAR ACCESS Left 4/21/2021    Procedure: REMOVAL, VASCULAR ACCESS PORT LEFT;  Surgeon: Rajan More MD;  Location: [facility] OR     REPAIR TENDON ELBOW Right 10/02/2019    Procedure: Right Elbow Flexor Lengthening, Flexor Pronator Slide Of Wrist and Finger, Thumb Adductor Lengthening;  Surgeon: Anai Franco MD;  Location: [facility] OR     TONSILLECTOMY Bilateral 10/02/2019    Procedure: Bilateral Tonsillectomy;  Surgeon: Farhana Guy MD;  Location: [facility] OR       Family History:   Family History   Problem Relation Age of Onset     Sickle Cell Trait Mother      Hypertension Mother      Asthma Mother      Sickle Cell Trait Father        Social History:  Social History     Socioeconomic History     Marital status: Single     Spouse name: Not on file     Number of children: Not on file     Years of education: Not on file     Highest education level: Not on file   Occupational History     Not on file   Tobacco Use     Smoking status: Never Smoker     Smokeless tobacco: Never Used   Substance and Sexual Activity     Alcohol use: Not Currently     Alcohol/week: 0.0 standard drinks     Drug use: Never     Sexual activity: Not Currently     Partners: Male     Birth control/protection: Other   Other Topics Concern     Parent/sibling w/ CABG, MI or angioplasty before 65F 55M? Not Asked   Social History Narrative    Lives with mom and extended family but \"toxic environment\" per her report. She would like to move out but cannot financially do so. She has minimal support at home despite her significant SCD comorbidities and cognitive delay from stroke.     Social Determinants of Health     Financial Resource Strain:      Difficulty of Paying " Living Expenses:    Food Insecurity:      Worried About Running Out of Food in the Last Year:      Ran Out of Food in the Last Year:    Transportation Needs:      Lack of Transportation (Medical):      Lack of Transportation (Non-Medical):    Physical Activity:      Days of Exercise per Week:      Minutes of Exercise per Session:    Stress:      Feeling of Stress :    Social Connections:      Frequency of Communication with Friends and Family:      Frequency of Social Gatherings with Friends and Family:      Attends [employer] Services:      Active Member of Clubs or Organizations:      Attends Club or Organization Meetings:      Marital Status:    Intimate Partner Violence: Not At Risk     Fear of Current or Ex-Partner: No     Emotionally Abused: No     Physically Abused: No     Sexually Abused: No       Medications:  Current Outpatient Medications   Medication     acetaminophen (TYLENOL) 325 MG tablet     albuterol (PROAIR HFA/PROVENTIL HFA/VENTOLIN HFA) 108 (90 Base) MCG/ACT inhaler     albuterol (PROVENTIL) (2.5 MG/3ML) 0.083% neb solution     ARIPiprazole (ABILIFY) 2 MG tablet     aspirin (ASA) 81 MG chewable tablet     budesonide-formoterol (SYMBICORT) 160-4.5 MCG/ACT Inhaler     dabigatran ANTICOAGULANT (PRADAXA) 150 MG capsule     diclofenac (VOLTAREN) 1 % topical gel     diphenhydrAMINE (BENADRYL) 25 MG capsule     EPINEPHrine (ANY BX GENERIC EQUIV) 0.3 MG/0.3ML injection 2-pack     Hydroxyurea 1000 MG TABS     hydrOXYzine (ATARAX) 25 MG tablet     JADENU 360 MG tablet     lidocaine-prilocaine (EMLA) 2.5-2.5 % external cream     medroxyPROGESTERone (DEPO-PROVERA) 150 MG/ML IM injection     naloxone (NARCAN) 4 MG/0.1ML nasal spray     ondansetron (ZOFRAN) 8 MG tablet     oxyCODONE (OXYCONTIN) 10 MG 12 hr tablet     oxyCODONE IR (ROXICODONE) 15 MG tablet     sertraline (ZOLOFT) 100 MG tablet     Current Facility-Administered Medications   Medication     medroxyPROGESTERone (DEPO-PROVERA) syringe 150 mg  "        Physical Exam:   /84   Pulse 120   Temp 98.6  F (37  C)   Resp 18   Ht 1.626 m (5' 4.02\")   Wt 76.7 kg (169 lb)   SpO2 93%   BMI 28.99 kg/m       GEN: young  female, calm and collected today, in a good mood and quite engaged  HEENT: slight icterus, MMM  RESP: clear to auscultation  SKIN: no bruising noted  NEURO: alert and oriented, right wrist contracture stable. Gait antalgic but stable      Labs:   Results for HIMANSHU AL (MRN [id number]) as of 8/16/2021 22:11   Ref. Range 8/16/2021 11:19   Sodium Latest Ref Range: 133 - 144 mmol/L 140   Potassium Latest Ref Range: 3.4 - 5.3 mmol/L 3.9   Chloride Latest Ref Range: 94 - 109 mmol/L 110 (H)   Carbon Dioxide Latest Ref Range: 20 - 32 mmol/L 22   Urea Nitrogen Latest Ref Range: 7 - 30 mg/dL 9   Creatinine Latest Ref Range: 0.52 - 1.04 mg/dL 0.56   GFR Estimate Latest Ref Range: >60 mL/min/1.73m2 >90   Calcium Latest Ref Range: 8.5 - 10.1 mg/dL 8.9   Anion Gap Latest Ref Range: 3 - 14 mmol/L 8   Albumin Latest Ref Range: 3.4 - 5.0 g/dL 4.0   Protein Total Latest Ref Range: 6.8 - 8.8 g/dL 7.6   Bilirubin Total Latest Ref Range: 0.2 - 1.3 mg/dL 4.1 (H)   Alkaline Phosphatase Latest Ref Range: 40 - 150 U/L 79   ALT Latest Ref Range: 0 - 50 U/L 82 (H)   AST Latest Ref Range: 0 - 45 U/L 84 (H)   Glucose Latest Ref Range: 70 - 99 mg/dL 97   WBC Latest Ref Range: 4.0 - 11.0 10e3/uL 13.8 (H)   Hemoglobin Latest Ref Range: 11.7 - 15.7 g/dL 7.7 (L)   Hematocrit Latest Ref Range: 35.0 - 47.0 % 20.9 (L)   Platelet Count Latest Ref Range: 150 - 450 10e3/uL 286   RBC Count Latest Ref Range: 3.80 - 5.20 10e6/uL 2.31 (L)   MCV Latest Ref Range: 78 - 100 fL 91   MCH Latest Ref Range: 26.5 - 33.0 pg 33.3 (H)   MCHC Latest Ref Range: 31.5 - 36.5 g/dL 36.8 (H)   RDW Latest Ref Range: 10.0 - 15.0 % 22.5 (H)   % Neutrophils Latest Units: % 59   % Lymphocytes Latest Units: % 20   % Monocytes Latest Units: % 7   % Eosinophils Latest Units: % 11 "   Absolute Basophils Latest Ref Range: 0.0 - 0.2 10e3/uL 0.2   % Basophils Latest Units: % 2   Absolute Eosinophils Latest Ref Range: 0.0 - 0.7 10e3/uL 1.5 (H)   Absolute Immature Granulocytes Latest Ref Range: <=0.0 10e3/uL 0.1 (H)   Absolute Lymphocytes Latest Ref Range: 0.8 - 5.3 10e3/uL 2.8   Absolute Monocytes Latest Ref Range: 0.0 - 1.3 10e3/uL 1.0   % Immature Granulocytes Latest Units: % 1   Absolute Neutrophils Latest Ref Range: 1.6 - 8.3 10e3/uL 8.3   Absolute NRBCs Latest Units: 10e3/uL 0.5   NRBCs per 100 WBC Latest Ref Range: <1 /100 4 (H)     Imaging:   Examination: NM LUNG SCAN PERFUSION PARTICULATE       Date: 8/7/2021 1:20 PM      Indication: History of PE, positive D-dimer, chest pain      Comparison: Chest x-ray 8/7/2021, nuclear medicine PET scan 7/13/2021     Technique:     The patient received 7 mCi of Tc-99m labeled MAA intravenously.  Ventilation imaging was deferred as per department policy precautions  during COVID-19 pandemic. A standard eight view lung perfusion scan  was obtained. SPECT images of the lungs were obtained. CT images of  the chest were obtained for the purposes of anatomic localization and  attenuation correction only.     Findings:     Planar images demonstrate multiple bilateral segmental and  subsegmental perfusion defects. Defect of the central posterior left  lung appears new compared to study from 7/13/2021.     Fused SPECT-CT images demonstrate no pleural effusion, pneumothorax,  or focal consolidation. Left IJ Port-A-Cath with tip in the SVC.  Cholecystectomy clips. Redemonstration of lymphadenopathy in the  visualized upper abdomen. Anemic blood pool with markedly low dense  cardiac chamber cavities.                                                                      Impression:  Bilateral segmental and subsegmental defects, with new defect of the  central posterior left lung compared to 7/13/2021, likely acute on  chronic pulmonary emboli. Anemic blood pool.  Multiple nonenlarged  mesenteric and retroperitoneal lymph nodes.        [Urgent Result: Acute on chronic pulmonary emboli]  ----------    Assessment:  Jennifer Cervantes is a 21 year old female with HbSS. Her disease has been complicated by stroke leading to some slight cognitive delay and right sided hemiparesis, high anxiety leading to frequent pain crises on top of chronic pain syndrome, poor social structure at home with no real support, and iron overload secondary to repeated transfusions.    She is doing much better today. She is calm and collected and feels better about the care in the last few weeks. Dr. Malloy was mentioned as a provider who demonstrated respect for her but regardless of the provider, she has felt respectful and prompt care has been the recent norm.    We are holding off on the chelation for now. Ferriscan due this week.    Major Topics of the Visit:  1. Pain Management: Updated pain plan. No major changes today though Benadryl was removed    -Crizanlizumab monthly (started June 2021)   2. Iron overload/Pharmacy Issues: She had Jadenu ordered a few months ago but there have been difficulties getting it at refill time. She is now on Desferal as well. LIC was 43 (3/2021) so we need to act urgently. Desferal HD over 48 hours is going well. Synthetic function for the liver seems to be intact  Ferriscan this week. Needs audiology follow up as well, last done June 2020.  3. High RBC turnover: Jennifer really has a high degree of RBC turnover, more than most patients I have seen. Oxybryta led to more frequent pain episodes (chest pain, which was new) and did not change the RBC turnover so it was stopped in December. No change currently  4. Pulmonary Emboli + new RUE DVT (innominate vein) of unclear chronicity + new symptomatic R cephalic SVT this week: Now on dabigatran after having DVT/PE while on apixaban and rivaroxaban. Her levels have been therapeutic so it is odd that her metabolism of drugs is  less predictable. We did start her back on ASA. She may have some pulmonary HTN with her recurrent chest discomfort and known PEs. Will work to get her back in to see pulmonology since she missed a visit last month  5. Stroke sequelae on right side: Follow up with  PM&R to see if they may be able to better assist with her weakness. I do not think a surgical approach is warranted at this point. This visit has not yet occurred  Follow up: plan to see Andrei Machado in a week    Review of inpatient care as documented above   Review of the result(s) of each unique test - labs as above  Diagnosis or treatment significantly limited by social determinants of health - sickle cell disease, racial inequity, difficult social situation at home  Ordering of specific tests      Eric Duncan MD  Hematologist  Division of Hematology, Oncology, and Transplantation  [facility]  [facility]  Pager: [phone]                Eric Duncan MD

## 2021-08-16 NOTE — ED TRIAGE NOTES
Pt BIB self for sickle cell crisis.  Pain is located in the lower in the lower back and BLE.  Pain started yesterday and home medications were not successful at reducing pain.  Pt also complains of LUQ abdominal pain.

## 2021-08-16 NOTE — PROGRESS NOTES
"Sickle Cell Outpatient Follow Up Visit      Date of encounter: Aug 16, 2021    Jennifer Cervantes is a 21 year old female who is being seen in person for hospital follow up and health maintenance related to SCD      Interval History: Since our last inpatient visit, she has done a bit better. She has still gone to the ED frequently but no admissions. She said her ED experiences have been markedly better, and she really appreciated the efforts providers have taken to treat her respectfully. She continues to have low-level acute on chronic PE though it seems more likely she may have some degree of pulmonary HTN. She has been on dabigatran and denies side effects. We have held her weekend chelation for now. The daily infusions a few weeks ago seemed to keep her out of the ED most of the time and since then, she has seemed to be coming in slightly less frequently. No refills needed today.    On the hospital note, she wanted to share her side of the story from the ED and give context to the situation. (Of note, I had strongly recommended she avoid cursing and being argumentative while she was in the hospital, which she did not take kindly to, since she rightly pointed out I did not have her full side of the story). She said that from the week ~7/10-13, she had been to the ED 3 times. In these situations, particularly on 7/11-12, she had been ignored in her opinion despite needing help, and was told \"everyone was too busy\" even though she said she could see several nursing staff at the desk. She was also still dyspneic and had only found that nebulizers helped at home. In the ED, she had waited 2 hours for pain medications and was having pleurisy (later found to have new PE) but the nurse insisted on doing an inhaler first. Jennifer insisted that the inhalers didn't work earlier in the day and nebs were the key but the nurse pushed back. At that point (and Jennifer admits to having a temper when feeling bad), she argued back with " the nurse. Since she felt no one would give her a nebulizer and she could not breathe comfortably, she refused the VQ scan and went home to use her home nebulizer. This context was not documented.    She then was seen in clinic the next day and was now hypoxic and still in pain. She received morphine x2 in clinic and then went to the ED. She received morphine about 75 minutes after admission but then there were 3 hours in between at which point she received a PCA. She was still very frustrated from the days prior and had a difficult encounter with her nurse. Jennifer had been there with her mom and when Jennifer said she would reach out to her mom given some of the disrespect she felt, she overheard the nurse make a disparaging remark about her mom. This was on top of the context (which we were not privy to until much later) that her stepfather had  just a few days before, so she was very emotional. She then waited 25 hours before admission with a PCA having been started around 3.5 hours in her ED stay but she rarely saw anyone later. Given the convergence of factors, she was frustrated about being stuck in the ED and this was documented in the EMR, though there was not clear documentation that anyone asked about her frustration triggers.    Now, 2 weeks later, her dyspnea is improved and she is tolerating the dabigatran. She still wants to wean opioids but does need more Oxycontin at this point.     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 She also has significant anxiety and depression with a strong anxiety about transferring to the adult clinic. She usually has pain crises  secondary to the anxiety.      --------------------------------  Plan last reviewed with patient: 7/26/2021    Patient background: 22yo F, enjoys movies and kids though there are times where she does not really want to talk to people. Does not have a lot of social support at home.     Sickle Cell Disease History  Primary Hematologist: Perla  PCP: Raul  Genotype: SS  Acute Pain Crisis Treatment:    ER/Acute Care/Infusion Clinic:   o Morphine 2 mg IVP/SC Q1H X 3 doses  o Toradol x1   o Maintenance IV fluids with LR  o Other: Zofran 8 mg IV PRN nausea (avoid Benadryl ideally--not very effective for opioid-induced pruritus)    Inpatient:  o Opioid: Morphine 2 mg IV Q1H PRN until PCA starts  o PCA plan:   - PCA button dose: Morphine 1 mg  - Lockout: 20 minutes  - Continuous Infusion: consider 1 mg/hr  - One hr max: 4 mg  o Other Medications: Zofran  o If PCA not working, she Has had success with ketamine starting at 4mg/h and advancing only to 6mg/h, as 8mg/h made her feel quite poorly.  o ASA back on 7/2021  o Supportive Care: Docusate Senna  Chronic Pain Medications:    Home regimen Oxycontin 10 mg q12h, oxycodone 15 mg p.o. q.4-6 hours p.r.n. breakthrough pain.  She also continues on Voltaren gel, and Zoloft among other medications.    -Also benefits from mental health visits, acupuncture  Baseline Hemoglobin: 7 g/dl without chronic transfusions  Hydroxyurea use: Yes  H/O blood transfusions: Yes, several (iron overload) Most recent 7/13/2021    H/O Transfusion Reactions: no    Antibodies:none  H/O Acute Chest Syndrome: Yes    Last episode: 10/2019     ICU/intubation: unsure  H/O Stroke: Yes (managed with chronic transfusions in the past, stopped late Spring 2020)  H/O VTE: Yes (2/2021)  H/O Cholecystectomy or Splenectomy: no  H/O Asthma, Pulm HTN, AVN, Leg Ulcers, Nephropathy, Retinopathy, etc: Iron overload, asthma, chronic lung disease, physical limitations from early stroke      -------------------------------------------    Sickle Cell Disease Comprehensive Checklist    Bone Health/Avascular Necrosis Screening/Symptoms (each visit): no new concerns today    Leg Ulcer evaluation (every visit): none    Hypertension (every visit): borderline hypertensive recently but improved later in evening and previous day on most of ED visits    Last pulmonary evaluation (asthma, AMAN, pulm HTN): within last year( due again)    Stroke/silent cerebral infarct Hx (Y/N): Yes TIA ~2014, first event ~age 1 with full stroke and R sided weakness    Last PCP Visit: 8/2020    Vaccines:  o PCV13: 5/13/19  o Pneumovax (PPSV23): 3/04, 10/09, 7/12/19 (next due 7/2024)  o Menactra: 4/2010, 9/2015 (MCV done 8/16)  o Influenza: got 20-21 vaccine per self report    Audiology (chelation): done 6/2020, normal (due again)    Past Medical History:  Past Medical History:   Diagnosis Date     Anxiety      Bleeding disorder (H)      Blood clotting disorder (H)      Cerebral infarction (H) 2015     Cognitive developmental delay     low IQ. 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 (H)     right hand contractures     Iron overload due to repeated red blood cell transfusions      Migraines      Multiple subsegmental pulmonary emboli without acute cor pulmonale (H) 02/01/2021     Oppositional defiant behavior      Superficial venous thrombosis of arm, right 03/25/2021     Uncomplicated asthma        Past Surgical History:  Past Surgical History:   Procedure Laterality Date     AS INSERT TUNNELED CV 2 CATH W/O PORT/PUMP       C BREAST REDUCTION (INCLUDES LIPO) TIER 3 Bilateral 04/23/2019     CHOLECYSTECTOMY       INSERT PORT VASCULAR ACCESS Left 4/21/2021    Procedure: INSERTION, VASCULAR ACCESS PORT (NOT SURE ON SIDE UNTIL REMOVAL);  Surgeon: Rajan More MD;  [facility]     IR CHEST PORT  "PLACEMENT > 5 YRS OF AGE  4/21/2021     IR CVC NON TUNNEL LINE REMOVAL  5/6/2021     IR CVC NON TUNNEL PLACEMENT  04/07/2020     IR CVC NON TUNNEL PLACEMENT  4/30/2021     IR PORT REMOVAL LEFT  4/21/2021     REMOVE PORT VASCULAR ACCESS Left 4/21/2021    Procedure: REMOVAL, VASCULAR ACCESS PORT LEFT;  Surgeon: Rajan More MD;  Location: [facility] OR     REPAIR TENDON ELBOW Right 10/02/2019    Procedure: Right Elbow Flexor Lengthening, Flexor Pronator Slide Of Wrist and Finger, Thumb Adductor Lengthening;  Surgeon: Anai Franco MD;  Location: [facility] OR     TONSILLECTOMY Bilateral 10/02/2019    Procedure: Bilateral Tonsillectomy;  Surgeon: Farhana Guy MD;  Location: [facility] OR       Family History:   Family History   Problem Relation Age of Onset     Sickle Cell Trait Mother      Hypertension Mother      Asthma Mother      Sickle Cell Trait Father        Social History:  Social History     Socioeconomic History     Marital status: Single     Spouse name: Not on file     Number of children: Not on file     Years of education: Not on file     Highest education level: Not on file   Occupational History     Not on file   Tobacco Use     Smoking status: Never Smoker     Smokeless tobacco: Never Used   Substance and Sexual Activity     Alcohol use: Not Currently     Alcohol/week: 0.0 standard drinks     Drug use: Never     Sexual activity: Not Currently     Partners: Male     Birth control/protection: Other   Other Topics Concern     Parent/sibling w/ CABG, MI or angioplasty before 65F 55M? Not Asked   Social History Narrative    Lives with mom and extended family but \"toxic environment\" per her report. She would like to move out but cannot financially do so. She has minimal support at home despite her significant SCD comorbidities and cognitive delay from stroke.     Social Determinants of Health     Financial Resource Strain:      Difficulty of Paying Living Expenses:    Food Insecurity:      Worried About " Running Out of Food in the Last Year:      Ran Out of Food in the Last Year:    Transportation Needs:      Lack of Transportation (Medical):      Lack of Transportation (Non-Medical):    Physical Activity:      Days of Exercise per Week:      Minutes of Exercise per Session:    Stress:      Feeling of Stress :    Social Connections:      Frequency of Communication with Friends and Family:      Frequency of Social Gatherings with Friends and Family:      Attends [employer] Services:      Active Member of Clubs or Organizations:      Attends Club or Organization Meetings:      Marital Status:    Intimate Partner Violence: Not At Risk     Fear of Current or Ex-Partner: No     Emotionally Abused: No     Physically Abused: No     Sexually Abused: No       Medications:  Current Outpatient Medications   Medication     acetaminophen (TYLENOL) 325 MG tablet     albuterol (PROAIR HFA/PROVENTIL HFA/VENTOLIN HFA) 108 (90 Base) MCG/ACT inhaler     albuterol (PROVENTIL) (2.5 MG/3ML) 0.083% neb solution     ARIPiprazole (ABILIFY) 2 MG tablet     aspirin (ASA) 81 MG chewable tablet     budesonide-formoterol (SYMBICORT) 160-4.5 MCG/ACT Inhaler     dabigatran ANTICOAGULANT (PRADAXA) 150 MG capsule     diclofenac (VOLTAREN) 1 % topical gel     diphenhydrAMINE (BENADRYL) 25 MG capsule     EPINEPHrine (ANY BX GENERIC EQUIV) 0.3 MG/0.3ML injection 2-pack     Hydroxyurea 1000 MG TABS     hydrOXYzine (ATARAX) 25 MG tablet     JADENU 360 MG tablet     lidocaine-prilocaine (EMLA) 2.5-2.5 % external cream     medroxyPROGESTERone (DEPO-PROVERA) 150 MG/ML IM injection     naloxone (NARCAN) 4 MG/0.1ML nasal spray     ondansetron (ZOFRAN) 8 MG tablet     oxyCODONE (OXYCONTIN) 10 MG 12 hr tablet     oxyCODONE IR (ROXICODONE) 15 MG tablet     sertraline (ZOLOFT) 100 MG tablet     Current Facility-Administered Medications   Medication     medroxyPROGESTERone (DEPO-PROVERA) syringe 150 mg         Physical Exam:   /84   Pulse 120   Temp 98.6  F  "(37  C)   Resp 18   Ht 1.626 m (5' 4.02\")   Wt 76.7 kg (169 lb)   SpO2 93%   BMI 28.99 kg/m       GEN: young  female, calm and collected today, in a good mood and quite engaged  HEENT: slight icterus, MMM  RESP: clear to auscultation  SKIN: no bruising noted  NEURO: alert and oriented, right wrist contracture stable. Gait antalgic but stable      Labs:   Results for HIMANSHU AL (MRN [id number]) as of 8/16/2021 22:11   Ref. Range 8/16/2021 11:19   Sodium Latest Ref Range: 133 - 144 mmol/L 140   Potassium Latest Ref Range: 3.4 - 5.3 mmol/L 3.9   Chloride Latest Ref Range: 94 - 109 mmol/L 110 (H)   Carbon Dioxide Latest Ref Range: 20 - 32 mmol/L 22   Urea Nitrogen Latest Ref Range: 7 - 30 mg/dL 9   Creatinine Latest Ref Range: 0.52 - 1.04 mg/dL 0.56   GFR Estimate Latest Ref Range: >60 mL/min/1.73m2 >90   Calcium Latest Ref Range: 8.5 - 10.1 mg/dL 8.9   Anion Gap Latest Ref Range: 3 - 14 mmol/L 8   Albumin Latest Ref Range: 3.4 - 5.0 g/dL 4.0   Protein Total Latest Ref Range: 6.8 - 8.8 g/dL 7.6   Bilirubin Total Latest Ref Range: 0.2 - 1.3 mg/dL 4.1 (H)   Alkaline Phosphatase Latest Ref Range: 40 - 150 U/L 79   ALT Latest Ref Range: 0 - 50 U/L 82 (H)   AST Latest Ref Range: 0 - 45 U/L 84 (H)   Glucose Latest Ref Range: 70 - 99 mg/dL 97   WBC Latest Ref Range: 4.0 - 11.0 10e3/uL 13.8 (H)   Hemoglobin Latest Ref Range: 11.7 - 15.7 g/dL 7.7 (L)   Hematocrit Latest Ref Range: 35.0 - 47.0 % 20.9 (L)   Platelet Count Latest Ref Range: 150 - 450 10e3/uL 286   RBC Count Latest Ref Range: 3.80 - 5.20 10e6/uL 2.31 (L)   MCV Latest Ref Range: 78 - 100 fL 91   MCH Latest Ref Range: 26.5 - 33.0 pg 33.3 (H)   MCHC Latest Ref Range: 31.5 - 36.5 g/dL 36.8 (H)   RDW Latest Ref Range: 10.0 - 15.0 % 22.5 (H)   % Neutrophils Latest Units: % 59   % Lymphocytes Latest Units: % 20   % Monocytes Latest Units: % 7   % Eosinophils Latest Units: % 11   Absolute Basophils Latest Ref Range: 0.0 - 0.2 10e3/uL 0.2   % " Basophils Latest Units: % 2   Absolute Eosinophils Latest Ref Range: 0.0 - 0.7 10e3/uL 1.5 (H)   Absolute Immature Granulocytes Latest Ref Range: <=0.0 10e3/uL 0.1 (H)   Absolute Lymphocytes Latest Ref Range: 0.8 - 5.3 10e3/uL 2.8   Absolute Monocytes Latest Ref Range: 0.0 - 1.3 10e3/uL 1.0   % Immature Granulocytes Latest Units: % 1   Absolute Neutrophils Latest Ref Range: 1.6 - 8.3 10e3/uL 8.3   Absolute NRBCs Latest Units: 10e3/uL 0.5   NRBCs per 100 WBC Latest Ref Range: <1 /100 4 (H)     Imaging:   Examination: NM LUNG SCAN PERFUSION PARTICULATE       Date: 8/7/2021 1:20 PM      Indication: History of PE, positive D-dimer, chest pain      Comparison: Chest x-ray 8/7/2021, nuclear medicine PET scan 7/13/2021     Technique:     The patient received 7 mCi of Tc-99m labeled MAA intravenously.  Ventilation imaging was deferred as per department policy precautions  during COVID-19 pandemic. A standard eight view lung perfusion scan  was obtained. SPECT images of the lungs were obtained. CT images of  the chest were obtained for the purposes of anatomic localization and  attenuation correction only.     Findings:     Planar images demonstrate multiple bilateral segmental and  subsegmental perfusion defects. Defect of the central posterior left  lung appears new compared to study from 7/13/2021.     Fused SPECT-CT images demonstrate no pleural effusion, pneumothorax,  or focal consolidation. Left IJ Port-A-Cath with tip in the SVC.  Cholecystectomy clips. Redemonstration of lymphadenopathy in the  visualized upper abdomen. Anemic blood pool with markedly low dense  cardiac chamber cavities.                                                                      Impression:  Bilateral segmental and subsegmental defects, with new defect of the  central posterior left lung compared to 7/13/2021, likely acute on  chronic pulmonary emboli. Anemic blood pool. Multiple nonenlarged  mesenteric and retroperitoneal lymph  nodes.        [Urgent Result: Acute on chronic pulmonary emboli]  ----------    Assessment:  Jennifer Cervantes is a 21 year old female with HbSS. Her disease has been complicated by stroke leading to some slight cognitive delay and right sided hemiparesis, high anxiety leading to frequent pain crises on top of chronic pain syndrome, poor social structure at home with no real support, and iron overload secondary to repeated transfusions.    She is doing much better today. She is calm and collected and feels better about the care in the last few weeks. Dr. Malloy was mentioned as a provider who demonstrated respect for her but regardless of the provider, she has felt respectful and prompt care has been the recent norm.    We are holding off on the chelation for now. Ferriscan due this week.    Major Topics of the Visit:  1. Pain Management: Updated pain plan. No major changes today though Benadryl was removed    -Crizanlizumab monthly (started June 2021)   2. Iron overload/Pharmacy Issues: She had Jadenu ordered a few months ago but there have been difficulties getting it at refill time. She is now on Desferal as well. LIC was 43 (3/2021) so we need to act urgently. Desferal HD over 48 hours is going well. Synthetic function for the liver seems to be intact  Ferriscan this week. Needs audiology follow up as well, last done June 2020.  3. High RBC turnover: Jennifer really has a high degree of RBC turnover, more than most patients I have seen. Oxybryta led to more frequent pain episodes (chest pain, which was new) and did not change the RBC turnover so it was stopped in December. No change currently  4. Pulmonary Emboli + new RUE DVT (innominate vein) of unclear chronicity + new symptomatic R cephalic SVT this week: Now on dabigatran after having DVT/PE while on apixaban and rivaroxaban. Her levels have been therapeutic so it is odd that her metabolism of drugs is less predictable. We did start her back on ASA. She may have  some pulmonary HTN with her recurrent chest discomfort and known PEs. Will work to get her back in to see pulmonology since she missed a visit last month  5. Stroke sequelae on right side: Follow up with  PM&R to see if they may be able to better assist with her weakness. I do not think a surgical approach is warranted at this point. This visit has not yet occurred  Follow up: plan to see Andrei Machado in a week    Review of inpatient care as documented above   Review of the result(s) of each unique test - labs as above  Diagnosis or treatment significantly limited by social determinants of health - sickle cell disease, racial inequity, difficult social situation at home  Ordering of specific tests      Eric Duncan MD  Hematologist  Division of Hematology, Oncology, and Transplantation  [facility]  [facility]  Pager: [phone]

## 2021-08-17 ENCOUNTER — TELEPHONE (OUTPATIENT)
Dept: CARE COORDINATION | Facility: CLINIC | Age: 21
End: 2021-08-17

## 2021-08-17 ENCOUNTER — TELEPHONE (OUTPATIENT)
Dept: ONCOLOGY | Facility: CLINIC | Age: 21
End: 2021-08-17

## 2021-08-17 ENCOUNTER — INFUSION THERAPY VISIT (OUTPATIENT)
Dept: TRANSPLANT | Facility: CLINIC | Age: 21
End: 2021-08-17
Attending: PEDIATRICS
Payer: COMMERCIAL

## 2021-08-17 VITALS
SYSTOLIC BLOOD PRESSURE: 135 MMHG | HEART RATE: 109 BPM | DIASTOLIC BLOOD PRESSURE: 71 MMHG | OXYGEN SATURATION: 100 % | TEMPERATURE: 98.4 F | RESPIRATION RATE: 16 BRPM

## 2021-08-17 DIAGNOSIS — D57.00 SICKLE CELL PAIN CRISIS (H): Primary | ICD-10-CM

## 2021-08-17 PROCEDURE — 999N000127 HC STATISTIC PERIPHERAL IV START W US GUIDANCE

## 2021-08-17 PROCEDURE — 250N000011 HC RX IP 250 OP 636: Performed by: PEDIATRICS

## 2021-08-17 PROCEDURE — 258N000003 HC RX IP 258 OP 636: Performed by: PEDIATRICS

## 2021-08-17 PROCEDURE — 96374 THER/PROPH/DIAG INJ IV PUSH: CPT

## 2021-08-17 PROCEDURE — 96361 HYDRATE IV INFUSION ADD-ON: CPT

## 2021-08-17 PROCEDURE — 96375 TX/PRO/DX INJ NEW DRUG ADDON: CPT

## 2021-08-17 RX ORDER — HEPARIN SODIUM,PORCINE 10 UNIT/ML
5 VIAL (ML) INTRAVENOUS
Status: CANCELLED | OUTPATIENT
Start: 2021-08-17

## 2021-08-17 RX ORDER — DIPHENHYDRAMINE HCL 25 MG
25 CAPSULE ORAL
Status: CANCELLED
Start: 2021-08-17

## 2021-08-17 RX ORDER — MORPHINE SULFATE 2 MG/ML
2 INJECTION, SOLUTION INTRAMUSCULAR; INTRAVENOUS
Status: DISCONTINUED | OUTPATIENT
Start: 2021-08-17 | End: 2021-08-17 | Stop reason: HOSPADM

## 2021-08-17 RX ORDER — DIPHENHYDRAMINE HCL 25 MG
25 CAPSULE ORAL
Status: DISCONTINUED | OUTPATIENT
Start: 2021-08-17 | End: 2021-08-17 | Stop reason: HOSPADM

## 2021-08-17 RX ORDER — ONDANSETRON 8 MG/1
8 TABLET, ORALLY DISINTEGRATING ORAL
Status: DISCONTINUED | OUTPATIENT
Start: 2021-08-17 | End: 2021-08-17 | Stop reason: HOSPADM

## 2021-08-17 RX ORDER — HEPARIN SODIUM (PORCINE) LOCK FLUSH IV SOLN 100 UNIT/ML 100 UNIT/ML
5 SOLUTION INTRAVENOUS
Status: CANCELLED | OUTPATIENT
Start: 2021-08-17

## 2021-08-17 RX ORDER — HEPARIN SODIUM,PORCINE 10 UNIT/ML
5 VIAL (ML) INTRAVENOUS
Status: DISCONTINUED | OUTPATIENT
Start: 2021-08-17 | End: 2021-08-17 | Stop reason: HOSPADM

## 2021-08-17 RX ORDER — ONDANSETRON 8 MG/1
8 TABLET, FILM COATED ORAL
Status: CANCELLED
Start: 2021-08-17

## 2021-08-17 RX ORDER — MORPHINE SULFATE 2 MG/ML
2 INJECTION, SOLUTION INTRAMUSCULAR; INTRAVENOUS
Status: CANCELLED
Start: 2021-08-17

## 2021-08-17 RX ORDER — HEPARIN SODIUM (PORCINE) LOCK FLUSH IV SOLN 100 UNIT/ML 100 UNIT/ML
5 SOLUTION INTRAVENOUS
Status: DISCONTINUED | OUTPATIENT
Start: 2021-08-17 | End: 2021-08-17 | Stop reason: HOSPADM

## 2021-08-17 RX ADMIN — MORPHINE SULFATE 2 MG: 2 INJECTION, SOLUTION INTRAMUSCULAR; INTRAVENOUS at 14:01

## 2021-08-17 RX ADMIN — DEXTROSE AND SODIUM CHLORIDE 500 ML: 5; 450 INJECTION, SOLUTION INTRAVENOUS at 12:02

## 2021-08-17 RX ADMIN — MORPHINE SULFATE 2 MG: 2 INJECTION, SOLUTION INTRAMUSCULAR; INTRAVENOUS at 12:02

## 2021-08-17 RX ADMIN — MORPHINE SULFATE 2 MG: 2 INJECTION, SOLUTION INTRAMUSCULAR; INTRAVENOUS at 13:07

## 2021-08-17 NOTE — NURSING NOTE
"Oncology Rooming Note    August 17, 2021 11:32 AM   Jennifer Cervantes is a 21 year old female who presents for:    Chief Complaint   Patient presents with     Infusion     pt here for add on IVF and pain meds r/t sickle cell crisis     Initial Vitals: /71   Pulse 109   Temp 98.4  F (36.9  C)   Resp 16   SpO2 100%  Estimated body mass index is 28.99 kg/m  as calculated from the following:    Height as of 8/16/21: 1.626 m (5' 4.02\").    Weight as of 8/16/21: 76.7 kg (169 lb). There is no height or weight on file to calculate BSA.  Data Unavailable Comment: Data Unavailable   No LMP recorded. Patient has had an injection.  Allergies reviewed: Yes  Medications reviewed: Yes    Medications: Medication refills not needed today.  Pharmacy name entered into EPIC:    [facility] - [place], [place] - [address]   [id number] - [facility] [address] [facility] - [place], [place] - [phone]  [id number] [address] [id number] [address] OF [address] [facility] - [place], [place] - [address]    Clinical concerns:  Pt here today for add on IVF and pain meds r/t sickle cell crisis.      Allison Bowman RN            "

## 2021-08-17 NOTE — PROGRESS NOTES
Infusion Nursing Note:  Jennifer Cervantes presents today for add on IVF and pain meds r/t sickle cell crisis.    Patient seen by provider today: No   present during visit today: Not Applicable.    Note:   Pt arrived to infusion complaining of 9/10 pain in her lower back, stabbing/sharp in nature. This RN, second RN, and vascular access RN attempted to access Left chest port with no success. Pt denies previously having any issues with this. PIV was placed by vascular RN. Pt received 2mg IV morphine x3, 500ml bolus of D51/2NS. Declined need for benadryl and zofran.       Intravenous Access:  Peripheral IV placed. Unable to access port.    Treatment Conditions:  Patient reports 9/10 pain pre infusion.    Post Infusion Assessment:  Patient tolerated infusion without incident. Pain rating post infusion was 6/10  Access discontinued per protocol.       Discharge Plan:   Patient discharged in stable condition accompanied by: self.  Departure Mode: Ambulatory.      Allison Bowman RN

## 2021-08-17 NOTE — TELEPHONE ENCOUNTER
SW received referral to assist with transportation coordination. SW called [employer] and arranged ride for patient. SW informed patient and clinic of ride information round trip ride,  at 10:20am with will call return ride. Patient will call [phone] for return ride when appointment is complete. SW will remain available as needed.       Corry GONZALEZ, UDAY  - Oncology  Phone : [phone]  Pager: [phone]

## 2021-08-17 NOTE — LETTER
8/17/2021         RE: Jennifer Cervantes  [address]  [place] [facility] [address]        Dear Colleague,    Thank you for referring your patient, Jennifer Cervantes, to the [facility] [place]. Please see a copy of my visit note below.    Infusion Nursing Note:  Jennifer Cervantes presents today for add on IVF and pain meds r/t sickle cell crisis.    Patient seen by provider today: No   present during visit today: Not Applicable.    Note:   Pt arrived to infusion complaining of 9/10 pain in her lower back, stabbing/sharp in nature. This RN, second RN, and vascular access RN attempted to access Left chest port with no success. Pt denies previously having any issues with this. PIV was placed by vascular RN. Pt received 2mg IV morphine x3, 500ml bolus of D51/2NS. Declined need for benadryl and zofran.       Intravenous Access:  Peripheral IV placed. Unable to access port.    Treatment Conditions:  Patient reports 9/10 pain pre infusion.    Post Infusion Assessment:  Patient tolerated infusion without incident. Pain rating post infusion was 6/10  Access discontinued per protocol.       Discharge Plan:   Patient discharged in stable condition accompanied by: self.  Departure Mode: Ambulatory.      Allison Bowman, RN                          Again, thank you for allowing me to participate in the care of your patient.        Sincerely,        [facility]

## 2021-08-17 NOTE — TELEPHONE ENCOUNTER
Pt called in to triage requesting IVF pain meds for lower back pain rated 9/10 x 1-2 days. Stated last took prn oxycontin , oxycodone and tylenol this morning without relief. Denied any fevers, chills, cough, sob, chest pain or other symptoms. Pt's last infusion was 8/11/21, last clinic visit Dr Duncan on 8/16/21 with follow-up on 9/20 Dr Duncan . Patient states they need assistance with a ride and it will take 15 minutes long to get to cancer clinic. Pt denied being out of home medications and needing any refills today.    7:18 Paged out DR Duncan.     8:26 2nd page to DR Duncan      8:39 DR Duncan returns call OK for IVF/Pain no labs needed. 11:00 am appointment is available in BMT.     8:40 Call placed to Jennifer she accepts the 11:00 am appointment for IVF/pain.     8:45 Page sent to [facility] for assistance with ride.     Message sent to  to schedule appointment.

## 2021-08-18 ENCOUNTER — HOSPITAL ENCOUNTER (OUTPATIENT)
Dept: MRI IMAGING | Facility: CLINIC | Age: 21
Discharge: HOME | End: 2021-08-18
Attending: PEDIATRICS | Admitting: PEDIATRICS
Payer: COMMERCIAL

## 2021-08-18 DIAGNOSIS — E83.111 HEMOCHROMATOSIS DUE TO REPEATED RED BLOOD CELL TRANSFUSIONS: ICD-10-CM

## 2021-08-18 DIAGNOSIS — D57.00 SICKLE CELL PAIN CRISIS (H): ICD-10-CM

## 2021-08-18 PROCEDURE — 74181 MRI ABDOMEN W/O CONTRAST: CPT

## 2021-08-18 PROCEDURE — 74181 MRI ABDOMEN W/O CONTRAST: CPT | Mod: 26 | Performed by: RADIOLOGY

## 2021-08-18 RX ORDER — OXYCODONE HYDROCHLORIDE 15 MG/1
TABLET ORAL
Qty: 60 TABLET | Refills: 0 | Status: SHIPPED | OUTPATIENT
Start: 2021-08-18 | End: 2021-08-30

## 2021-08-18 NOTE — TELEPHONE ENCOUNTER
[facility] Telephone Triage Note    The following symptoms were reported:   Typical  Description:            Onset:  Late last night around 9-10pm.   Location: lower back  Character: Sharp           Intensity: 9/10    Accompanying Signs & Symptoms:  none    Chest Pain:  denies     Shortness of Breath:  denies     Fever:  denies     Chills:  denies   Cough/sore throat:  denies  Other:   Refill request  Medication(s) requested:  Oxycodone IR   How is the medication being taken?: 1 tablet every 6 hours, with max 6 per daily.   Does pt have enough for today? Has a few days left.   Is pain being adequately controlled on the current regimen?: Okay, needs occasional IVF/Pain infusions as needed.    Experiencing any side effects from medication?: none    Date of most recent appointment:  8/16/21 Dr. Duncan  Next appointment:   8/24 with Andrei HIGGINS  Last fill date and by whom:  Dr. Duncan   Reviewed: Not an agent    10-15 minutes.     Therapies Tried and outcome: Oxycodone and heat with temporary relief.     Improved by: oxycodone.    The following provider was consulted:  11:15am Andrei HIGGINS approved for IVF/Pain, no  Labs needed.        The following advice/orders were given, and/or interventions recommended:  Pending appt availability  As of 2:25pm no appts available.   Oxy IR prescription has been signed and sent to pharmacy.    Patient instructions and/or follow up:    Called and relayed information to Pt, who verbalized understanding.      Instructed patient to seek care immediately for worsening symptoms, including: fever, chest pain, shortness of breath, dizziness.

## 2021-08-19 ENCOUNTER — HOSPITAL ENCOUNTER (EMERGENCY)
Facility: CLINIC | Age: 21
Discharge: HOME | End: 2021-08-19
Attending: EMERGENCY MEDICINE | Admitting: EMERGENCY MEDICINE
Payer: COMMERCIAL

## 2021-08-19 VITALS
SYSTOLIC BLOOD PRESSURE: 98 MMHG | DIASTOLIC BLOOD PRESSURE: 64 MMHG | HEART RATE: 90 BPM | HEIGHT: 64 IN | BODY MASS INDEX: 28.85 KG/M2 | WEIGHT: 169 LBS | OXYGEN SATURATION: 97 % | RESPIRATION RATE: 16 BRPM | TEMPERATURE: 98.3 F

## 2021-08-19 DIAGNOSIS — D57.00 SICKLE CELL PAIN CRISIS (H): ICD-10-CM

## 2021-08-19 LAB
BASOPHILS # BLD MANUAL: 0.3 10E3/UL (ref 0–0.2)
BASOPHILS NFR BLD MANUAL: 2 %
EOSINOPHIL # BLD MANUAL: 0.8 10E3/UL (ref 0–0.7)
EOSINOPHIL NFR BLD MANUAL: 5 %
ERYTHROCYTE [DISTWIDTH] IN BLOOD BY AUTOMATED COUNT: 26.5 % (ref 10–15)
FRAGMENTS BLD QL SMEAR: SLIGHT
HCT VFR BLD AUTO: 22.3 % (ref 35–47)
HGB BLD-MCNC: 8 G/DL (ref 11.7–15.7)
HOLD SPECIMEN: NORMAL
LACTATE SERPL-SCNC: 1.2 MMOL/L (ref 0.7–2)
LYMPHOCYTES # BLD MANUAL: 3.2 10E3/UL (ref 0.8–5.3)
LYMPHOCYTES NFR BLD MANUAL: 20 %
MCH RBC QN AUTO: 34.6 PG (ref 26.5–33)
MCHC RBC AUTO-ENTMCNC: 35.9 G/DL (ref 31.5–36.5)
MCV RBC AUTO: 97 FL (ref 78–100)
MONOCYTES # BLD MANUAL: 0.8 10E3/UL (ref 0–1.3)
MONOCYTES NFR BLD MANUAL: 5 %
NEUTROPHILS # BLD MANUAL: 10.9 10E3/UL (ref 1.6–8.3)
NEUTROPHILS NFR BLD MANUAL: 68 %
NRBC # BLD AUTO: 2.1 10E3/UL
NRBC BLD MANUAL-RTO: 13 %
PLAT MORPH BLD: ABNORMAL
PLATELET # BLD AUTO: 301 10E3/UL (ref 150–450)
POLYCHROMASIA BLD QL SMEAR: ABNORMAL
RBC # BLD AUTO: 2.31 10E6/UL (ref 3.8–5.2)
RBC MORPH BLD: ABNORMAL
SICKLE CELLS BLD QL SMEAR: ABNORMAL
TARGETS BLD QL SMEAR: SLIGHT
WBC # BLD AUTO: 16.1 10E3/UL (ref 4–11)

## 2021-08-19 PROCEDURE — 96376 TX/PRO/DX INJ SAME DRUG ADON: CPT | Performed by: EMERGENCY MEDICINE

## 2021-08-19 PROCEDURE — 96374 THER/PROPH/DIAG INJ IV PUSH: CPT | Performed by: EMERGENCY MEDICINE

## 2021-08-19 PROCEDURE — 250N000011 HC RX IP 250 OP 636: Performed by: EMERGENCY MEDICINE

## 2021-08-19 PROCEDURE — 96361 HYDRATE IV INFUSION ADD-ON: CPT | Performed by: EMERGENCY MEDICINE

## 2021-08-19 PROCEDURE — 85027 COMPLETE CBC AUTOMATED: CPT | Performed by: EMERGENCY MEDICINE

## 2021-08-19 PROCEDURE — 36415 COLL VENOUS BLD VENIPUNCTURE: CPT | Performed by: EMERGENCY MEDICINE

## 2021-08-19 PROCEDURE — 258N000003 HC RX IP 258 OP 636: Performed by: EMERGENCY MEDICINE

## 2021-08-19 PROCEDURE — 99285 EMERGENCY DEPT VISIT HI MDM: CPT | Mod: 25 | Performed by: EMERGENCY MEDICINE

## 2021-08-19 PROCEDURE — 99284 EMERGENCY DEPT VISIT MOD MDM: CPT | Performed by: EMERGENCY MEDICINE

## 2021-08-19 PROCEDURE — 83605 ASSAY OF LACTIC ACID: CPT | Performed by: EMERGENCY MEDICINE

## 2021-08-19 RX ORDER — HEPARIN SODIUM (PORCINE) LOCK FLUSH IV SOLN 100 UNIT/ML 100 UNIT/ML
100 SOLUTION INTRAVENOUS ONCE
Status: COMPLETED | OUTPATIENT
Start: 2021-08-19 | End: 2021-08-19

## 2021-08-19 RX ORDER — SODIUM CHLORIDE, SODIUM LACTATE, POTASSIUM CHLORIDE, CALCIUM CHLORIDE 600; 310; 30; 20 MG/100ML; MG/100ML; MG/100ML; MG/100ML
1000 INJECTION, SOLUTION INTRAVENOUS CONTINUOUS
Status: DISCONTINUED | OUTPATIENT
Start: 2021-08-19 | End: 2021-08-19 | Stop reason: HOSPADM

## 2021-08-19 RX ORDER — MORPHINE SULFATE 2 MG/ML
2 INJECTION, SOLUTION INTRAMUSCULAR; INTRAVENOUS
Status: COMPLETED | OUTPATIENT
Start: 2021-08-19 | End: 2021-08-19

## 2021-08-19 RX ORDER — HEPARIN SODIUM (PORCINE) LOCK FLUSH IV SOLN 100 UNIT/ML 100 UNIT/ML
5-10 SOLUTION INTRAVENOUS
Status: DISCONTINUED | OUTPATIENT
Start: 2021-08-19 | End: 2021-08-19 | Stop reason: HOSPADM

## 2021-08-19 RX ADMIN — SODIUM CHLORIDE, POTASSIUM CHLORIDE, SODIUM LACTATE AND CALCIUM CHLORIDE 1000 ML: 600; 310; 30; 20 INJECTION, SOLUTION INTRAVENOUS at 06:29

## 2021-08-19 RX ADMIN — MORPHINE SULFATE 2 MG: 2 INJECTION, SOLUTION INTRAMUSCULAR; INTRAVENOUS at 02:15

## 2021-08-19 RX ADMIN — SODIUM CHLORIDE, POTASSIUM CHLORIDE, SODIUM LACTATE AND CALCIUM CHLORIDE 1000 ML: 600; 310; 30; 20 INJECTION, SOLUTION INTRAVENOUS at 02:15

## 2021-08-19 RX ADMIN — MORPHINE SULFATE 2 MG: 2 INJECTION, SOLUTION INTRAMUSCULAR; INTRAVENOUS at 03:31

## 2021-08-19 RX ADMIN — Medication 100 UNITS: at 07:04

## 2021-08-19 RX ADMIN — MORPHINE SULFATE 2 MG: 2 INJECTION, SOLUTION INTRAMUSCULAR; INTRAVENOUS at 04:58

## 2021-08-19 ASSESSMENT — MIFFLIN-ST. JEOR: SCORE: 1511.58

## 2021-08-19 NOTE — ED PROVIDER NOTES
ED Provider Note  [facility]      History     Chief Complaint   Patient presents with     Sickle Cell Pain Crisis     HPI  Jennifer Cervantes is a 21 year old female with a history of sickle cell disease with multiple previous complications, presents to the ED with complaint of sickle cell pain crisis.  She reports that today she developed a typical sickle cell crisis for her which includes low back pain, radiation to her hips.  She denies any sign of illness such as fever, cough, shortness of breath, vomiting, dysuria.  She denies any trauma.  No numbness or weakness.  Of note, due to extremely long wait times in the ED due to high patient census, the patient started to receive her pain regimen prior to my seeing her and evaluating her.  She states that she now feels dramatically improved and feels she can be discharged.  She reports that she is not having a left upper quadrant pain during this visit, recently had an MRI for further evaluation.  Of note, that is not yet resulted.    Past Medical History  Past Medical History:   Diagnosis Date     Anxiety      Bleeding disorder (H)      Blood clotting disorder (H)      Cerebral infarction (H) 2015     Cognitive developmental delay     low IQ. 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 (H)     right hand contractures     Iron overload due to repeated red blood cell transfusions      Migraines      Multiple subsegmental pulmonary emboli without acute cor pulmonale (H) 02/01/2021     Oppositional defiant behavior      Superficial venous thrombosis of arm, right 03/25/2021     Uncomplicated asthma      Past Surgical History:   Procedure Laterality Date     AS INSERT TUNNELED CV 2 CATH W/O PORT/PUMP       C BREAST REDUCTION (INCLUDES LIPO) TIER 3 Bilateral 04/23/2019     CHOLECYSTECTOMY       INSERT PORT VASCULAR ACCESS  Left 4/21/2021    Procedure: INSERTION, VASCULAR ACCESS PORT (NOT SURE ON SIDE UNTIL REMOVAL);  Surgeon: Rajan More MD;  Location: [facility] OR     IR CHEST PORT PLACEMENT > 5 YRS OF AGE  4/21/2021     IR CVC NON TUNNEL LINE REMOVAL  5/6/2021     IR CVC NON TUNNEL PLACEMENT  04/07/2020     IR CVC NON TUNNEL PLACEMENT  4/30/2021     IR PORT REMOVAL LEFT  4/21/2021     REMOVE PORT VASCULAR ACCESS Left 4/21/2021    Procedure: REMOVAL, VASCULAR ACCESS PORT LEFT;  Surgeon: Rajan More MD;  Location: [facility] OR     REPAIR TENDON ELBOW Right 10/02/2019    Procedure: Right Elbow Flexor Lengthening, Flexor Pronator Slide Of Wrist and Finger, Thumb Adductor Lengthening;  Surgeon: Anai Franco MD;  Location: [facility] OR     TONSILLECTOMY Bilateral 10/02/2019    Procedure: Bilateral Tonsillectomy;  Surgeon: Farhana Guy MD;  Location: [facility] OR     acetaminophen (TYLENOL) 325 MG tablet  albuterol (PROAIR HFA/PROVENTIL HFA/VENTOLIN HFA) 108 (90 Base) MCG/ACT inhaler  albuterol (PROVENTIL) (2.5 MG/3ML) 0.083% neb solution  ARIPiprazole (ABILIFY) 2 MG tablet  aspirin (ASA) 81 MG chewable tablet  budesonide-formoterol (SYMBICORT) 160-4.5 MCG/ACT Inhaler  dabigatran ANTICOAGULANT (PRADAXA) 150 MG capsule  diclofenac (VOLTAREN) 1 % topical gel  diphenhydrAMINE (BENADRYL) 25 MG capsule  EPINEPHrine (ANY BX GENERIC EQUIV) 0.3 MG/0.3ML injection 2-pack  Hydroxyurea 1000 MG TABS  hydrOXYzine (ATARAX) 25 MG tablet  JADENU 360 MG tablet  lidocaine-prilocaine (EMLA) 2.5-2.5 % external cream  naloxone (NARCAN) 4 MG/0.1ML nasal spray  omeprazole (PRILOSEC) 20 MG DR capsule  ondansetron (ZOFRAN) 8 MG tablet  oxyCODONE (OXYCONTIN) 10 MG 12 hr tablet  oxyCODONE IR (ROXICODONE) 15 MG tablet  sertraline (ZOLOFT) 100 MG tablet  medroxyPROGESTERone (DEPO-PROVERA) 150 MG/ML IM injection      Allergies   Allergen Reactions     Contrast Dye      Hives and breathing issues     Fish-Derived Products Hives     Seafood Hives     Diagnostic  "X-Ray Materials      Gadolinium      Family History  Family History   Problem Relation Age of Onset     Sickle Cell Trait Mother      Hypertension Mother      Asthma Mother      Sickle Cell Trait Father      Social History   Social History     Tobacco Use     Smoking status: Never Smoker     Smokeless tobacco: Never Used   Substance Use Topics     Alcohol use: Not Currently     Alcohol/week: 0.0 standard drinks     Drug use: Never      Past medical history, past surgical history, medications, allergies, family history, and social history were reviewed with the patient. No additional pertinent items.       Review of Systems  A complete review of systems was performed with pertinent positives and negatives noted in the HPI, and all other systems negative.    Physical Exam   BP: 131/88  Pulse: 110  Temp: 98.4  F (36.9  C)  Resp: 14  Height: 162.6 cm (5' 4\")  Weight: 76.7 kg (169 lb)  SpO2: 93 %  Physical Exam  Constitutional:       General: She is not in acute distress.     Appearance: She is not diaphoretic.   HENT:      Head: Atraumatic.   Eyes:      General: No scleral icterus.  Cardiovascular:      Heart sounds: Normal heart sounds.   Pulmonary:      Effort: No respiratory distress.      Breath sounds: Normal breath sounds.   Abdominal:      Palpations: Abdomen is soft.      Tenderness: There is no abdominal tenderness.   Musculoskeletal:         General: No tenderness.      Comments: LE strength and sensation nl/equal bilaterally - aside from chronic footdrop   Skin:     General: Skin is warm.      Findings: No rash.         ED Course      Procedures              Results for orders placed or performed during the hospital encounter of 08/19/21   CBC with platelets differential     Status: Abnormal    Narrative    The following orders were created for panel order CBC with platelets differential.  Procedure                               Abnormality         Status                     ---------                          "      -----------         ------                     CBC with platelets and d...[[id number]]  Abnormal            Final result               Manual Differential[[id number]]          Abnormal            Final result                 Please view results for these tests on the individual orders.   Extra Blue Top Tube     Status: None   Result Value Ref Range    Hold Specimen JIC    Extra Red Top Tube     Status: None   Result Value Ref Range    Hold Specimen JIC    Extra Green Top (Lithium Heparin) Tube     Status: None   Result Value Ref Range    Hold Specimen JIC    Extra Purple Top Tube     Status: None   Result Value Ref Range    Hold Specimen JIC    CBC with platelets and differential     Status: Abnormal   Result Value Ref Range    WBC Count 16.1 (H) 4.0 - 11.0 10e3/uL    RBC Count 2.31 (L) 3.80 - 5.20 10e6/uL    Hemoglobin 8.0 (L) 11.7 - 15.7 g/dL    Hematocrit 22.3 (L) 35.0 - 47.0 %    MCV 97 78 - 100 fL    MCH 34.6 (H) 26.5 - 33.0 pg    MCHC 35.9 31.5 - 36.5 g/dL    RDW 26.5 (H) 10.0 - 15.0 %    Platelet Count 301 150 - 450 10e3/uL   Manual Differential     Status: Abnormal   Result Value Ref Range    % Neutrophils 68 %    % Lymphocytes 20 %    % Monocytes 5 %    % Eosinophils 5 %    % Basophils 2 %    NRBCs per 100 WBC 13 (H) <=0 %    Absolute Neutrophils 10.9 (H) 1.6 - 8.3 10e3/uL    Absolute Lymphocytes 3.2 0.8 - 5.3 10e3/uL    Absolute Monocytes 0.8 0.0 - 1.3 10e3/uL    Absolute Eosinophils 0.8 (H) 0.0 - 0.7 10e3/uL    Absolute Basophils 0.3 (H) 0.0 - 0.2 10e3/uL    Absolute NRBCs 2.1 (H) <=0.0 10e3/uL    RBC Morphology Confirmed RBC Indices     Platelet Assessment  Automated Count Confirmed. Platelet morphology is normal.     Automated Count Confirmed. Platelet morphology is normal.    RBC Fragments Slight (A) None Seen    Polychromasia Moderate (A) None Seen    Sickle Cells Moderate (A) None Seen    Target Cells Slight (A) None Seen   Lactic acid whole blood STAT     Status: Normal   Result Value Ref Range     Lactic Acid 1.2 0.7 - 2.0 mmol/L   [place] Draw     Status: None    Narrative    The following orders were created for panel order [place] Draw.  Procedure                               Abnormality         Status                     ---------                               -----------         ------                     Extra Blue Top Tube[[id number]]                              Final result               Extra Red Top Tube[[id number]]                               Final result               Extra Green Top (Lithium...[[id number]]                      Final result               Extra Purple Top Tube[[id number]]                            Final result                 Please view results for these tests on the individual orders.     Medications   lactated ringers infusion (0 mLs Intravenous Stopped 8/19/21 0648)   heparin 100 UNIT/ML injection 5-10 mL (has no administration in time range)   morphine (PF) injection 2 mg (2 mg Intravenous Given 8/19/21 0458)   lactated ringers BOLUS 1,000 mL (0 mLs Intravenous Stopped 8/19/21 0451)   heparin 100 UNIT/ML injection 100 Units (100 Units Intravenous Given 8/19/21 0704)        Assessments & Plan (with Medical Decision Making)   CBC was checked today, and hemoglobin is 8.  That is actually slightly improved when compared with most recent.  White blood cell count is gone up a little today to 16.1.  She denies any infectious symptoms.  She did not report any chest pain or shortness of breath.  She told me that her pain is better and she would like to be discharged.  I did again discuss with her today that I reviewed her chart and do see that over the last 26 days since discharge from the hospital most recently, she has either presented to the ED or to the infusion center every day but to for IV pain medications.  I expressed concern that her pain management plan is an adequate for her.  She immediately became angry, stated that she remembered that I was the doctor to talk to her  about this before.  She states she is not making this up, demands immediate discharge.  I stressed to her that I did not think she was making this up, that I believe she is having pain, but I do not feel that this is appropriate ongoing management of her underlying sickle cell pain-she has received IV pain medications on a (near)daily basis.  The last time I saw her I strongly recommended admission for trying to create a better plan for managing her pain, she declined at that time, and now, again, demands immediate discharge.  I was unaware at the time, but she had her mom on speaker phone.  She asked her mom if she has been in for IV meds every day, and her mom responded in the affirmative, stating that she has been in nearly every day.  Her mother also tried to calm her, stating that I was suggesting that her pain plan be adjusted so that her pain be better managed.  The patient said that she understood that, but continued to seem disgruntled and very angry and agitated.  She continued to demand immediate discharge, so she was discharged.  She is encouraged to follow-up as soon as possible with her hematologist.    Of note, after speaking with the patient I did reach out to her primary hematologist, Dr. Duncan.  He is aware of her frequent ED visits as well as infusion center visits.  He is presently trying to formulate an alternate plan for the patient regarding her pain control/management.  We will be in touch with her as he is able to work towards implementing his plans.  Hopefully in the future this will help to break her current cycle of daily IV opioid needs.    Dictation Disclaimer: Some of this Note has been completed with voice-recognition dictation software. Although errors are generally corrected real-time, there is the potential for a rare error to be present in the completed chart.      I have reviewed the nursing notes. I have reviewed the findings, diagnosis, plan and need for follow up with the  patient.    Discharge Medication List as of 8/19/2021  7:06 AM          Final diagnoses:   Sickle cell pain crisis (H)       --  Court Ring  [facility] EMERGENCY DEPARTMENT  8/19/2021     Court Ring MD  08/19/21 0816

## 2021-08-19 NOTE — ED TRIAGE NOTES
Pt BIB self for sickle cell pain crisis.  The pain is localized to her lower back this evening.  She has tried all home medications and is unable to get on top of her pain.

## 2021-08-19 NOTE — DISCHARGE INSTRUCTIONS
Follow up with your outpatient hematologist as soon as possible with your hematologist to discuss adjusting your pain plan.

## 2021-08-19 NOTE — PROGRESS NOTES
The plan is to transition to primarily PO opioids rather than IV. However, 4 attempts to reach Jennifer over the afternoon and early evening failed to connect. I will try again tomorrow to discuss the plan again, as I do not want to make the change without discussing it first.    David Duncan MD

## 2021-08-23 ENCOUNTER — HOME INFUSION (PRE-WILLOW HOME INFUSION) (OUTPATIENT)
Dept: PHARMACY | Facility: CLINIC | Age: 21
End: 2021-08-23

## 2021-08-23 NOTE — PROGRESS NOTES
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      She was admitted 2/1/21-2/3/21 with a new PE, started on Rivaroxaban. Switched to Eliquis 3/25/21 with RUE DVT.     She was admitted 4/26/21-5/11/21 with sickle cell pain crisis complicated by worsening PE in setting of low Apixaban levels, acute hypoxic respiratory failure, pneumonia, acute chest syndrome s/p exchange transfusion on 4/30 and 5/4. After 2nd exchange her oxygen requirement dramatically improved from 20L to 1-2L 5/5 and she was off oxygen as of 5/6.      She was admitted 7/13/21-7/25/21 for acute on chronic PE, switched to dabigitran + ASA.     Interval History:  Jennifer was seen today for hematology follow-up. She is doing OK. She admits to having back in her back and hips. She has been trying to manage at home but feels like the Oxycodone isn't helping as much as it used to. She hasn't been in the ED since 8/19/21 though which is a big improvement and she states she has been trying to do more warm baths/hot packs at home which has helped. She otherwise is doing OK. No fevers. She has had more migraines lately with associated nausea. She has had these in the past but they seem to be happening more often. She has not seen neurology before.    She denies chest pain, SOB, or cough. Confirmed she is on Dabigitran and other than easy bruising is tolerating it well. Her port has been pruritic and slightly sore. She is interested in doing acupuncture. She denies any GI issues  or edema.     Current Outpatient Medications   Medication Sig Dispense Refill     DULoxetine (CYMBALTA) 30 MG capsule Take 1 capsule (30 mg) by mouth daily for 7 days, THEN 1 capsule (30 mg) 2 times daily. 187 capsule 0     oxyCODONE (OXYCONTIN) 10 MG 12 hr tablet Take 1 tablet (10 mg) by mouth every 12 hours 60 tablet 0     acetaminophen (TYLENOL) 325 MG tablet Take 2 tablets (650 mg) by mouth every 6 hours as needed for mild pain 120 tablet 3     albuterol (PROAIR HFA/PROVENTIL HFA/VENTOLIN HFA) 108 (90 Base) MCG/ACT inhaler Inhale 2 puffs into the lungs every 6 hours as needed for shortness of breath / dyspnea or wheezing 8.5 g 3     albuterol (PROVENTIL) (2.5 MG/3ML) 0.083% neb solution Take 1 vial (2.5 mg) by nebulization every 6 hours as needed for shortness of breath / dyspnea or wheezing 12 mL 4     ARIPiprazole (ABILIFY) 2 MG tablet Take 1 tablet (2 mg) by mouth daily 30 tablet 3     aspirin (ASA) 81 MG chewable tablet Take 1 tablet (81 mg) by mouth daily       budesonide-formoterol (SYMBICORT) 160-4.5 MCG/ACT Inhaler Inhale 1 puff into the lungs every evening 10.2 g 3     dabigatran ANTICOAGULANT (PRADAXA) 150 MG capsule Take 1 capsule (150 mg) by mouth 2 times daily Store in original 's bottle or blister pack; use within 120 days of opening. 60 capsule 0     diclofenac (VOLTAREN) 1 % topical gel Apply 4 g topically 4 times daily as needed for moderate pain Apply to back, legs, and arms for pain 150 g 3     diphenhydrAMINE (BENADRYL) 25 MG capsule Take 1-2 capsules (25-50 mg) by mouth nightly as needed for sleep 60 capsule 3     EPINEPHrine (ANY BX GENERIC EQUIV) 0.3 MG/0.3ML injection 2-pack Inject 0.3 mLs (0.3 mg) into the muscle as needed for anaphylaxis (Patient not taking: Reported on 8/25/2021) 1 each 1     Hydroxyurea 1000 MG TABS Take 2,000 mg by mouth daily 60 tablet 3     hydrOXYzine (ATARAX) 25 MG tablet Take 1 tablet (25 mg) by mouth 3 times daily as needed for anxiety 30 tablet 1      JADENU 360 MG tablet Take 4 tablets (1,440 mg) by mouth every evening 120 tablet 4     lidocaine-prilocaine (EMLA) 2.5-2.5 % external cream Apply topically as needed for moderate pain 30 g 1     medroxyPROGESTERone (DEPO-PROVERA) 150 MG/ML IM injection Inject 150 mg into the muscle       naloxone (NARCAN) 4 MG/0.1ML nasal spray Spray 1 spray (4 mg) into one nostril alternating nostrils once as needed for opioid reversal every 2-3 minutes until assistance arrives (Patient not taking: Reported on 8/25/2021) 0.2 mL 1     omeprazole (PRILOSEC) 20 MG DR capsule Take 1 capsule (20 mg) by mouth daily 30 capsule 1     ondansetron (ZOFRAN) 8 MG tablet Take 8 mg by mouth every 8 hours as needed        oxyCODONE IR (ROXICODONE) 15 MG tablet Take 1 tablet (15mg) by mouth every 4-6 hours as needed for severe pain. Goal 4 per day. Max 6 per day. 60 tablet 0     Physical Examination:  BP (!) 143/83   Pulse 105   Temp 98.8  F (37.1  C) (Oral)   Resp 16   Wt 77.1 kg (170 lb)   SpO2 98%   BMI 29.18 kg/m    Wt Readings from Last 10 Encounters:   08/19/21 76.7 kg (169 lb)   08/16/21 76.7 kg (169 lb)   08/16/21 77.1 kg (170 lb)   08/15/21 77.1 kg (170 lb)   08/12/21 74.8 kg (165 lb)   08/10/21 74.8 kg (165 lb)   08/08/21 74.8 kg (165 lb)   08/07/21 75.2 kg (165 lb 12.6 oz)   08/04/21 75.2 kg (165 lb 11.2 oz)   08/03/21 77.1 kg (170 lb)     Constitutional: Well-appearing female in no acute distress.  Eyes: EOMI, PERRL. No scleral icterus.  ENT: Oral mucosa is moist without lesions or thrush.   Lymphatic: Neck is supple without cervical or supraclavicular lymphadenopathy.   Cardiovascular: Regular rate and rhythm. No murmurs, gallops, or rubs. No peripheral edema.  Respiratory: Clear to auscultation bilaterally. No wheezes or crackles.  Gastrointestinal: Bowel sounds present. Abdomen soft, non-tender.   Neurologic: Cranial nerves II through XII are grossly intact. R side weakness.  Skin: No rashes, petechiae, or bruising noted  on exposed skin.    Laboratory Data:  Results for HIMANSHU AL (MRN [id number]) as of 8/25/2021 16:46   8/24/2021 12:02 8/24/2021 12:03   Sodium 139    Potassium 3.8    Chloride 111 (H)    Carbon Dioxide 22    Urea Nitrogen 6 (L)    Creatinine 0.51 (L)    GFR Estimate >90    Calcium 8.7    Anion Gap 6    Glucose 94    WBC  13.7 (H)   Hemoglobin  8.0 (L)   Hematocrit  22.2 (L)   Platelet Count  292   RBC Count  2.37 (L)   MCV  94   MCH  33.8 (H)   MCHC  36.0   RDW  23.5 (H)   Absolute NRBCs  0.8   NRBCs per 100 WBC  6 (H)   % Retic  27.2 (H)   Absolute Retic  0.654 (H)     Assessment and Plan:  1. Sickle Cell Disease  HgbSS complicated by hx stroke, acute chest, iron overload, chronic pain, acute chest, acute on chronic pulmonary embolism with multiple recent hospitalizations. Clinically she appears well today.      Labs: Hgb stable at 8.0. Slight leukocytosis improving.      Meds: Stopped Voxeletor. Continue Hydrea 2000mg daily. Contineu Jadenu 4 tablets daily. She is now on Crizanlizumab monthly since June 2021-will get today. Still working on Desferral-will discuss with Dr. Duncan. Her ferriscan shows worsening iron deposition.      Pain Control: Continue OxyContin 10mg BID. Continue Oxycodone to 15mg PRN max 6 per day. Continue Tylenol PRN, and Celebrex PRN. OK to use Voltaren gel as needed. Discussed adding neuropathic agent as adjunct-she didn't find any relief with gabapentin or lyrica in the past but is agreeable to trying Cymbalta. Will do 30mg daily x 1 week, then increase to 30mg BID. Will talk to Dr. Pierce about acupuncture. OK for IVF and pain meds today with Jr.      Nursing to evaluate port as it is covered for our visit and if still issues can send to IR for evaluation.      Follow-up: Has follow up with Dr. Duncan in Sept. Will work on getting her in to meet Patricia Hodge during my transition.      2. Neuro  History of stroke. Back on ASA.      Will see Dr. Pierce tomorrow for weakness/spasticity  from stroke.      Migraines, chronic issue, more recently. Will refer to neurology.      3. Psych  History of anxiety and depression. Continue Sertraline 200mg daily and Abilify 2mg daily and Hydroxyzine PRN. Overall mood stable. Dicussed with pharmacy and they recommended stopping Sertraline with starting Cymbalta. Will see how this transition goes and if issues can restart Sertraline.      Follows with outside therapist every week.      OK to use Benadryl PRN insomnia.      4. Pulm  History of asthma, continue Symbicort and Albuterol PRN. Has pulm visit scheduled. Concern for possible pulm HTN with recurrent chest discomfort and PEs.     Acute on chronic PE and DVT, failure of apixaban and rivaroxaban despite therapeutic levels. Now on Dabigitran + ASA. No concerns for recurrent VTE.     Sating well today.    40 minutes spent on the date of the encounter doing chart review, review of test results, interpretation of tests, patient visit and documentation     Andrei Machado PA-C  [facility]  [address], [place] [id number] [phone]

## 2021-08-24 ENCOUNTER — APPOINTMENT (OUTPATIENT)
Dept: LAB | Facility: CLINIC | Age: 21
End: 2021-08-24
Attending: PEDIATRICS
Payer: COMMERCIAL

## 2021-08-24 ENCOUNTER — ONCOLOGY VISIT (OUTPATIENT)
Dept: ONCOLOGY | Facility: CLINIC | Age: 21
End: 2021-08-24
Attending: PEDIATRICS
Payer: COMMERCIAL

## 2021-08-24 ENCOUNTER — TELEPHONE (OUTPATIENT)
Dept: ONCOLOGY | Facility: CLINIC | Age: 21
End: 2021-08-24

## 2021-08-24 ENCOUNTER — INFUSION THERAPY VISIT (OUTPATIENT)
Dept: TRANSPLANT | Facility: CLINIC | Age: 21
End: 2021-08-24
Attending: PHYSICIAN ASSISTANT
Payer: COMMERCIAL

## 2021-08-24 VITALS
RESPIRATION RATE: 16 BRPM | BODY MASS INDEX: 29.18 KG/M2 | SYSTOLIC BLOOD PRESSURE: 143 MMHG | TEMPERATURE: 98.8 F | HEART RATE: 105 BPM | WEIGHT: 170 LBS | DIASTOLIC BLOOD PRESSURE: 83 MMHG | OXYGEN SATURATION: 98 %

## 2021-08-24 DIAGNOSIS — Z86.73 HISTORY OF STROKE: ICD-10-CM

## 2021-08-24 DIAGNOSIS — D57.00 SICKLE CELL CRISIS (H): ICD-10-CM

## 2021-08-24 DIAGNOSIS — D57.00 SICKLE CELL PAIN CRISIS (H): Primary | ICD-10-CM

## 2021-08-24 DIAGNOSIS — G43.019 INTRACTABLE MIGRAINE WITHOUT AURA AND WITHOUT STATUS MIGRAINOSUS: ICD-10-CM

## 2021-08-24 DIAGNOSIS — D57.00 SICKLE CELL CRISIS (H): Primary | ICD-10-CM

## 2021-08-24 DIAGNOSIS — D57.00 SICKLE CELL PAIN CRISIS (H): ICD-10-CM

## 2021-08-24 PROCEDURE — 250N000011 HC RX IP 250 OP 636: Performed by: PHYSICIAN ASSISTANT

## 2021-08-24 PROCEDURE — 96375 TX/PRO/DX INJ NEW DRUG ADDON: CPT

## 2021-08-24 PROCEDURE — 250N000011 HC RX IP 250 OP 636: Performed by: PEDIATRICS

## 2021-08-24 PROCEDURE — 258N000003 HC RX IP 258 OP 636: Performed by: PHYSICIAN ASSISTANT

## 2021-08-24 PROCEDURE — 258N000003 HC RX IP 258 OP 636: Performed by: PEDIATRICS

## 2021-08-24 PROCEDURE — G0463 HOSPITAL OUTPT CLINIC VISIT: HCPCS | Mod: 25

## 2021-08-24 PROCEDURE — 96376 TX/PRO/DX INJ SAME DRUG ADON: CPT

## 2021-08-24 PROCEDURE — 96361 HYDRATE IV INFUSION ADD-ON: CPT

## 2021-08-24 PROCEDURE — 96365 THER/PROPH/DIAG IV INF INIT: CPT

## 2021-08-24 PROCEDURE — 99215 OFFICE O/P EST HI 40 MIN: CPT | Performed by: PHYSICIAN ASSISTANT

## 2021-08-24 RX ORDER — ONDANSETRON 8 MG/1
8 TABLET, FILM COATED ORAL
Status: CANCELLED
Start: 2021-08-24

## 2021-08-24 RX ORDER — HEPARIN SODIUM (PORCINE) LOCK FLUSH IV SOLN 100 UNIT/ML 100 UNIT/ML
5 SOLUTION INTRAVENOUS EVERY 8 HOURS PRN
Status: DISCONTINUED | OUTPATIENT
Start: 2021-08-24 | End: 2021-08-25 | Stop reason: HOSPADM

## 2021-08-24 RX ORDER — OXYCODONE HCL 10 MG/1
10 TABLET, FILM COATED, EXTENDED RELEASE ORAL EVERY 12 HOURS
Qty: 60 TABLET | Refills: 0 | Status: SHIPPED | OUTPATIENT
Start: 2021-08-24 | End: 2021-09-22

## 2021-08-24 RX ORDER — HEPARIN SODIUM (PORCINE) LOCK FLUSH IV SOLN 100 UNIT/ML 100 UNIT/ML
5 SOLUTION INTRAVENOUS
Status: CANCELLED | OUTPATIENT
Start: 2021-08-25

## 2021-08-24 RX ORDER — MORPHINE SULFATE 2 MG/ML
2 INJECTION, SOLUTION INTRAMUSCULAR; INTRAVENOUS
Status: CANCELLED
Start: 2021-08-24

## 2021-08-24 RX ORDER — HEPARIN SODIUM,PORCINE 10 UNIT/ML
5 VIAL (ML) INTRAVENOUS
Status: CANCELLED | OUTPATIENT
Start: 2021-08-24

## 2021-08-24 RX ORDER — DIPHENHYDRAMINE HCL 25 MG
25 CAPSULE ORAL
Status: CANCELLED
Start: 2021-08-24

## 2021-08-24 RX ORDER — MEPERIDINE HYDROCHLORIDE 25 MG/ML
25 INJECTION INTRAMUSCULAR; INTRAVENOUS; SUBCUTANEOUS EVERY 30 MIN PRN
Status: CANCELLED | OUTPATIENT
Start: 2021-08-25

## 2021-08-24 RX ORDER — METHYLPREDNISOLONE SODIUM SUCCINATE 125 MG/2ML
125 INJECTION, POWDER, LYOPHILIZED, FOR SOLUTION INTRAMUSCULAR; INTRAVENOUS
Status: CANCELLED
Start: 2021-08-25

## 2021-08-24 RX ORDER — ALBUTEROL SULFATE 90 UG/1
1-2 AEROSOL, METERED RESPIRATORY (INHALATION)
Status: CANCELLED
Start: 2021-08-25

## 2021-08-24 RX ORDER — DIPHENHYDRAMINE HYDROCHLORIDE 50 MG/ML
50 INJECTION INTRAMUSCULAR; INTRAVENOUS
Status: CANCELLED
Start: 2021-08-25

## 2021-08-24 RX ORDER — HEPARIN SODIUM,PORCINE 10 UNIT/ML
5 VIAL (ML) INTRAVENOUS
Status: CANCELLED | OUTPATIENT
Start: 2021-08-25

## 2021-08-24 RX ORDER — NALOXONE HYDROCHLORIDE 0.4 MG/ML
0.2 INJECTION, SOLUTION INTRAMUSCULAR; INTRAVENOUS; SUBCUTANEOUS
Status: CANCELLED | OUTPATIENT
Start: 2021-08-25

## 2021-08-24 RX ORDER — MORPHINE SULFATE 2 MG/ML
2 INJECTION, SOLUTION INTRAMUSCULAR; INTRAVENOUS
Status: COMPLETED | OUTPATIENT
Start: 2021-08-24 | End: 2021-08-24

## 2021-08-24 RX ORDER — DULOXETIN HYDROCHLORIDE 30 MG/1
CAPSULE, DELAYED RELEASE ORAL
Qty: 187 CAPSULE | Refills: 0 | Status: SHIPPED | OUTPATIENT
Start: 2021-08-24 | End: 2021-11-01

## 2021-08-24 RX ORDER — HEPARIN SODIUM (PORCINE) LOCK FLUSH IV SOLN 100 UNIT/ML 100 UNIT/ML
5 SOLUTION INTRAVENOUS
Status: CANCELLED | OUTPATIENT
Start: 2021-08-24

## 2021-08-24 RX ORDER — EPINEPHRINE 1 MG/ML
0.3 INJECTION, SOLUTION INTRAMUSCULAR; SUBCUTANEOUS EVERY 5 MIN PRN
Status: CANCELLED | OUTPATIENT
Start: 2021-08-25

## 2021-08-24 RX ORDER — ALBUTEROL SULFATE 0.83 MG/ML
2.5 SOLUTION RESPIRATORY (INHALATION)
Status: CANCELLED | OUTPATIENT
Start: 2021-08-25

## 2021-08-24 RX ADMIN — SODIUM CHLORIDE 400 MG: 9 INJECTION, SOLUTION INTRAVENOUS at 14:04

## 2021-08-24 RX ADMIN — MORPHINE SULFATE 2 MG: 2 INJECTION, SOLUTION INTRAMUSCULAR; INTRAVENOUS at 15:13

## 2021-08-24 RX ADMIN — DEXTROSE AND SODIUM CHLORIDE 500 ML: 5; 450 INJECTION, SOLUTION INTRAVENOUS at 15:13

## 2021-08-24 RX ADMIN — MORPHINE SULFATE 2 MG: 2 INJECTION, SOLUTION INTRAMUSCULAR; INTRAVENOUS at 17:11

## 2021-08-24 RX ADMIN — Medication 5 ML: at 11:55

## 2021-08-24 RX ADMIN — MORPHINE SULFATE 2 MG: 2 INJECTION, SOLUTION INTRAMUSCULAR; INTRAVENOUS at 16:09

## 2021-08-24 ASSESSMENT — PAIN SCALES - GENERAL: PAINLEVEL: EXTREME PAIN (9)

## 2021-08-24 NOTE — TELEPHONE ENCOUNTER
[facility] Telephone Triage Note    The following symptoms were reported:   Typical  Description:            Onset: last night 10pm  Location: Lower back and hips  Character: Sharp stabbing pain           Intensity: 9/10    Accompanying Signs & Symptoms:  none    Chest Pain:  denies     Shortness of Breath:  denies     Fever:  denies     Chills:  denies   Cough/sore throat:  denies  Other:  Scheduled to see Andrei HIGGINS at 11:45-Noon appt.     Therapies Tried and outcome: warm blankets, warm bath.   Took Tylenol and Oxycodone around 6:00am  And long acting Oxycontin every 12 hours.     Improved by: minimal relief    The following provider was consulted:  7:47am Andrei HIGGINS approved for SERA/IVF/PAIN and Labs.     The following advice/orders were given, and/or interventions recommended:    Appts for 11:30am Labs  12:00 Provider appt with Andrei HIGGINS  1:00pm SERA/IVF/Pain infusion.     Scheduling notified      Patient instructions and/or follow up:    Called and relayed information to Pt, who verbalized understanding.  Already has ride arranged so can be in clinic at 11:30am for labs and rest of the plan.

## 2021-08-24 NOTE — LETTER
8/24/2021         RE: Jennifer Cervantes  [address]  [facility] [address]        Dear Colleague,    Thank you for referring your patient, Jennifer Cervantes, to the [facility]LOOD AND MARROW [facility]ANSPLANT [facility] [place]. Please see a copy of my visit note below.    Infusion Nursing Note:  Jennifer Cervantes presents today for add-on infusions.    Patient seen by provider today: Yes: Andrei Machado   present during visit today: Not Applicable.    Note: Labs were monitored.      Intravenous Access:  Implanted Port.  Provider will reassess site tomorrow.    Treatment Conditions:  Per Provider order, Patient received crizanlizumab infusion over one hour.  She received IV fluids over two hours.  Patient received three doses of 2 mg. Morphine, each spaced one hour apart.      Post Infusion Assessment:  Patient tolerated infusion without incident.  She reported partial relief from the Morphine and was satisfied with today's treatment.       Discharge Plan:   Patient discharged in stable condition accompanied by: family.      ELINA WINTER RN                          Again, thank you for allowing me to participate in the care of your patient.        Sincerely,        [facility]

## 2021-08-24 NOTE — NURSING NOTE
"Oncology Rooming Note    August 24, 2021 12:14 PM   Jennifer Cervantes is a 21 year old female who presents for:    Chief Complaint   Patient presents with     Port Draw     Labs drawn via PORT by RN in lab, VS taken.      Oncology Clinic Visit     Pt candie for a rtn of Sickle Cell      Initial Vitals: Blood Pressure (Abnormal) 143/83   Pulse 105   Temperature 98.8  F (37.1  C) (Oral)   Respiration 16   Weight 77.1 kg (170 lb)   Oxygen Saturation 98%   Body Mass Index 29.18 kg/m   Estimated body mass index is 29.18 kg/m  as calculated from the following:    Height as of 8/19/21: 1.626 m (5' 4\").    Weight as of this encounter: 77.1 kg (170 lb). Body surface area is 1.87 meters squared.  Extreme Pain (9) Comment: Data Unavailable   No LMP recorded. Patient has had an injection.  Allergies reviewed: Yes  Medications reviewed: Yes    Medications: Medication refills not needed today.  Pharmacy name entered into Balanced:    [facility] - [place], [place] - [phone]  [facility] - [place], [place] - [address] [place] [address] AVE     Clinical concerns: none       Lily Steiner MA            "

## 2021-08-24 NOTE — LETTER
8/24/2021         RE: Jennifer Cervantes  [address]  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      She was admitted 2/1/21-2/3/21 with a new PE, started on Rivaroxaban. Switched to Eliquis 3/25/21 with RUE DVT.     She was admitted 4/26/21-5/11/21 with sickle cell pain crisis complicated by worsening PE in setting of low Apixaban levels, acute hypoxic respiratory failure, pneumonia, acute chest syndrome s/p exchange transfusion on 4/30 and 5/4. After 2nd exchange her oxygen requirement dramatically improved from 20L to 1-2L 5/5 and she was off oxygen as of 5/6.      She was admitted 7/13/21-7/25/21 for acute on chronic PE, switched to dabigitran + ASA.     Interval History:  Jennifer was seen today for hematology follow-up. She is doing OK. She admits to having back in her back and hips. She has been trying to manage at home but feels like the Oxycodone isn't helping as much as it used to. She hasn't been in the ED since 8/19/21 though which is a big improvement and she states she has been trying to do more warm baths/hot packs at home which has helped. She otherwise is doing OK. No fevers. She has had more migraines lately with associated nausea. She has had these in the past but they seem to be happening more often. She has not seen neurology before.    She denies chest pain, SOB, or cough. Confirmed she is on Dabigitran and other than easy bruising is tolerating it well. Her port has been  pruritic and slightly sore. She is interested in doing acupuncture. She denies any GI issues or edema.     Current Outpatient Medications   Medication Sig Dispense Refill     DULoxetine (CYMBALTA) 30 MG capsule Take 1 capsule (30 mg) by mouth daily for 7 days, THEN 1 capsule (30 mg) 2 times daily. 187 capsule 0     oxyCODONE (OXYCONTIN) 10 MG 12 hr tablet Take 1 tablet (10 mg) by mouth every 12 hours 60 tablet 0     acetaminophen (TYLENOL) 325 MG tablet Take 2 tablets (650 mg) by mouth every 6 hours as needed for mild pain 120 tablet 3     albuterol (PROAIR HFA/PROVENTIL HFA/VENTOLIN HFA) 108 (90 Base) MCG/ACT inhaler Inhale 2 puffs into the lungs every 6 hours as needed for shortness of breath / dyspnea or wheezing 8.5 g 3     albuterol (PROVENTIL) (2.5 MG/3ML) 0.083% neb solution Take 1 vial (2.5 mg) by nebulization every 6 hours as needed for shortness of breath / dyspnea or wheezing 12 mL 4     ARIPiprazole (ABILIFY) 2 MG tablet Take 1 tablet (2 mg) by mouth daily 30 tablet 3     aspirin (ASA) 81 MG chewable tablet Take 1 tablet (81 mg) by mouth daily       budesonide-formoterol (SYMBICORT) 160-4.5 MCG/ACT Inhaler Inhale 1 puff into the lungs every evening 10.2 g 3     dabigatran ANTICOAGULANT (PRADAXA) 150 MG capsule Take 1 capsule (150 mg) by mouth 2 times daily Store in original 's bottle or blister pack; use within 120 days of opening. 60 capsule 0     diclofenac (VOLTAREN) 1 % topical gel Apply 4 g topically 4 times daily as needed for moderate pain Apply to back, legs, and arms for pain 150 g 3     diphenhydrAMINE (BENADRYL) 25 MG capsule Take 1-2 capsules (25-50 mg) by mouth nightly as needed for sleep 60 capsule 3     EPINEPHrine (ANY BX GENERIC EQUIV) 0.3 MG/0.3ML injection 2-pack Inject 0.3 mLs (0.3 mg) into the muscle as needed for anaphylaxis (Patient not taking: Reported on 8/25/2021) 1 each 1     Hydroxyurea 1000 MG TABS Take 2,000 mg by mouth daily 60 tablet 3     hydrOXYzine (ATARAX)  25 MG tablet Take 1 tablet (25 mg) by mouth 3 times daily as needed for anxiety 30 tablet 1     JADENU 360 MG tablet Take 4 tablets (1,440 mg) by mouth every evening 120 tablet 4     lidocaine-prilocaine (EMLA) 2.5-2.5 % external cream Apply topically as needed for moderate pain 30 g 1     medroxyPROGESTERone (DEPO-PROVERA) 150 MG/ML IM injection Inject 150 mg into the muscle       naloxone (NARCAN) 4 MG/0.1ML nasal spray Spray 1 spray (4 mg) into one nostril alternating nostrils once as needed for opioid reversal every 2-3 minutes until assistance arrives (Patient not taking: Reported on 8/25/2021) 0.2 mL 1     omeprazole (PRILOSEC) 20 MG DR capsule Take 1 capsule (20 mg) by mouth daily 30 capsule 1     ondansetron (ZOFRAN) 8 MG tablet Take 8 mg by mouth every 8 hours as needed        oxyCODONE IR (ROXICODONE) 15 MG tablet Take 1 tablet (15mg) by mouth every 4-6 hours as needed for severe pain. Goal 4 per day. Max 6 per day. 60 tablet 0     Physical Examination:  BP (!) 143/83   Pulse 105   Temp 98.8  F (37.1  C) (Oral)   Resp 16   Wt 77.1 kg (170 lb)   SpO2 98%   BMI 29.18 kg/m    Wt Readings from Last 10 Encounters:   08/19/21 76.7 kg (169 lb)   08/16/21 76.7 kg (169 lb)   08/16/21 77.1 kg (170 lb)   08/15/21 77.1 kg (170 lb)   08/12/21 74.8 kg (165 lb)   08/10/21 74.8 kg (165 lb)   08/08/21 74.8 kg (165 lb)   08/07/21 75.2 kg (165 lb 12.6 oz)   08/04/21 75.2 kg (165 lb 11.2 oz)   08/03/21 77.1 kg (170 lb)     Constitutional: Well-appearing female in no acute distress.  Eyes: EOMI, PERRL. No scleral icterus.  ENT: Oral mucosa is moist without lesions or thrush.   Lymphatic: Neck is supple without cervical or supraclavicular lymphadenopathy.   Cardiovascular: Regular rate and rhythm. No murmurs, gallops, or rubs. No peripheral edema.  Respiratory: Clear to auscultation bilaterally. No wheezes or crackles.  Gastrointestinal: Bowel sounds present. Abdomen soft, non-tender.   Neurologic: Cranial nerves II  through XII are grossly intact. R side weakness.  Skin: No rashes, petechiae, or bruising noted on exposed skin.    Laboratory Data:  Results for HIMANSHU AL (MRN [id number]) as of 8/25/2021 16:46   8/24/2021 12:02 8/24/2021 12:03   Sodium 139    Potassium 3.8    Chloride 111 (H)    Carbon Dioxide 22    Urea Nitrogen 6 (L)    Creatinine 0.51 (L)    GFR Estimate >90    Calcium 8.7    Anion Gap 6    Glucose 94    WBC  13.7 (H)   Hemoglobin  8.0 (L)   Hematocrit  22.2 (L)   Platelet Count  292   RBC Count  2.37 (L)   MCV  94   MCH  33.8 (H)   MCHC  36.0   RDW  23.5 (H)   Absolute NRBCs  0.8   NRBCs per 100 WBC  6 (H)   % Retic  27.2 (H)   Absolute Retic  0.654 (H)     Assessment and Plan:  1. Sickle Cell Disease  HgbSS complicated by hx stroke, acute chest, iron overload, chronic pain, acute chest, acute on chronic pulmonary embolism with multiple recent hospitalizations. Clinically she appears well today.      Labs: Hgb stable at 8.0. Slight leukocytosis improving.      Meds: Stopped Voxeletor. Continue Hydrea 2000mg daily. Contineu Jadenu 4 tablets daily. She is now on Crizanlizumab monthly since June 2021-will get today. Still working on Desferral-will discuss with Dr. Duncan. Her ferriscan shows worsening iron deposition.      Pain Control: Continue OxyContin 10mg BID. Continue Oxycodone to 15mg PRN max 6 per day. Continue Tylenol PRN, and Celebrex PRN. OK to use Voltaren gel as needed. Discussed adding neuropathic agent as adjunct-she didn't find any relief with gabapentin or lyrica in the past but is agreeable to trying Cymbalta. Will do 30mg daily x 1 week, then increase to 30mg BID. Will talk to Dr. Pierce about acupuncture. OK for IVF and pain meds today with Jr.      Nursing to evaluate port as it is covered for our visit and if still issues can send to IR for evaluation.      Follow-up: Has follow up with Dr. Duncan in Sept. Will work on getting her in to meet Patricia Hodge during my transition.      2.  Neuro  History of stroke. Back on ASA.      Will see Dr. Pierce tomorrow for weakness/spasticity from stroke.      Migraines, chronic issue, more recently. Will refer to neurology.      3. Psych  History of anxiety and depression. Continue Sertraline 200mg daily and Abilify 2mg daily and Hydroxyzine PRN. Overall mood stable. Dicussed with pharmacy and they recommended stopping Sertraline with starting Cymbalta. Will see how this transition goes and if issues can restart Sertraline.      Follows with outside therapist every week.      OK to use Benadryl PRN insomnia.      4. Pulm  History of asthma, continue Symbicort and Albuterol PRN. Has pulm visit scheduled. Concern for possible pulm HTN with recurrent chest discomfort and PEs.     Acute on chronic PE and DVT, failure of apixaban and rivaroxaban despite therapeutic levels. Now on Dabigitran + ASA. No concerns for recurrent VTE.     Sating well today.    40 minutes spent on the date of the encounter doing chart review, review of test results, interpretation of tests, patient visit and documentation     Andrei aMchado PA-C  [facility]  [address] [place] [address]  [phone]          RONALDO Allan

## 2021-08-24 NOTE — PROGRESS NOTES
[facility]   PM&R clinic note        Interval history:     Jennifer Cervantes presents to clinic today for follow up reg her rehab needs.   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  Was last seen in clinic on 7/28/21.  Recommendations included:  1. Therapy/equipment/braces:  1. Will discuss with Orthotics regarding splint options for RUE/RLE.  2. Interventions:  1. Patient is interested in botulinum toxin injections to see if this can help improve her range in RUE/RLE. I discussed that in areas of previous surgical intervention and contractures, we will not improve range, but that it could benefit her in other muscles that are tight to improve ROM. Will discuss this further PM&R colleagues to evaluate risks/benefits of trying botulinum toxin in the setting of her medical co-morbidities and will confirm this with the patient by phone.  3. Follow up: 3-4 weeks.      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    Symptoms,  Patient was seen this morning for a return visit. She is doing ok this morning. She states that she continues to have significant pain over the weekends due to her sickle cell crisis and has had to have a few more ED visits because of this. Her Hematology team is currently working on optimizing her pain medication regimen.  Patient brought in her elbow brace today, as she would like to find bracing for her right elbow and right knee that allows for some range while giving her more stability. She feels like her current orthotics are not working well.  She is also interested in acupuncture as a possible modality to help with pain relief as she has tried this in the past.   Lastly, she is still interested in  trying botulinum toxin injections for some of her right upper extremity and lower extremity muscles to help improve her range in the setting of spasticity. 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       Therapies/HEP,  Patient continues with home stretching program to spastic right upper/lower extremity.      Functionally,   No changes since her last visit.      Social history is unchanged.          Medications:  Current Outpatient Medications   Medication Sig Dispense Refill     acetaminophen (TYLENOL) 325 MG tablet Take 2 tablets (650 mg) by mouth every 6 hours as needed for mild pain 120 tablet 3     albuterol (PROAIR HFA/PROVENTIL HFA/VENTOLIN HFA) 108 (90 Base) MCG/ACT inhaler Inhale 2 puffs into the lungs every 6 hours as needed for shortness of breath / dyspnea or wheezing 8.5 g 3     albuterol (PROVENTIL) (2.5 MG/3ML) 0.083% neb solution Take 1 vial (2.5 mg) by nebulization every 6 hours as needed for shortness of breath / dyspnea or wheezing 12 mL 4     ARIPiprazole (ABILIFY) 2 MG tablet Take 1 tablet (2 mg) by mouth daily 30 tablet 3     aspirin (ASA) 81 MG chewable tablet Take 1 tablet (81 mg) by mouth daily       budesonide-formoterol (SYMBICORT) 160-4.5 MCG/ACT Inhaler Inhale 1 puff into the lungs every evening 10.2 g 3     dabigatran ANTICOAGULANT (PRADAXA) 150 MG capsule Take 1 capsule (150 mg) by mouth 2 times daily Store in original 's bottle or blister pack; use within 120 days of opening. 60 capsule 0     diclofenac (VOLTAREN) 1 % topical gel Apply 4 g topically 4 times daily as needed for moderate pain Apply to back, legs, and arms for pain 150 g 3     diphenhydrAMINE (BENADRYL) 25 MG capsule Take 1-2 capsules (25-50 mg) by mouth nightly as needed for sleep 60 capsule 3     DULoxetine (CYMBALTA) 30 MG capsule Take 1 capsule (30 mg) by mouth daily for 7 days, THEN 1 capsule (30 mg) 2 times daily. 187 capsule 0      "Hydroxyurea 1000 MG TABS Take 2,000 mg by mouth daily 60 tablet 3     hydrOXYzine (ATARAX) 25 MG tablet Take 1 tablet (25 mg) by mouth 3 times daily as needed for anxiety 30 tablet 1     JADENU 360 MG tablet Take 4 tablets (1,440 mg) by mouth every evening 120 tablet 4     lidocaine-prilocaine (EMLA) 2.5-2.5 % external cream Apply topically as needed for moderate pain 30 g 1     omeprazole (PRILOSEC) 20 MG DR capsule Take 1 capsule (20 mg) by mouth daily 30 capsule 1     ondansetron (ZOFRAN) 8 MG tablet Take 8 mg by mouth every 8 hours as needed        oxyCODONE (OXYCONTIN) 10 MG 12 hr tablet Take 1 tablet (10 mg) by mouth every 12 hours 60 tablet 0     oxyCODONE IR (ROXICODONE) 15 MG tablet Take 1 tablet (15mg) by mouth every 4-6 hours as needed for severe pain. Goal 4 per day. Max 6 per day. 60 tablet 0     EPINEPHrine (ANY BX GENERIC EQUIV) 0.3 MG/0.3ML injection 2-pack Inject 0.3 mLs (0.3 mg) into the muscle as needed for anaphylaxis (Patient not taking: Reported on 8/25/2021) 1 each 1     medroxyPROGESTERone (DEPO-PROVERA) 150 MG/ML IM injection Inject 150 mg into the muscle       naloxone (NARCAN) 4 MG/0.1ML nasal spray Spray 1 spray (4 mg) into one nostril alternating nostrils once as needed for opioid reversal every 2-3 minutes until assistance arrives (Patient not taking: Reported on 8/25/2021) 0.2 mL 1              Physical Exam:   /83   Pulse 104   Temp 98.3  F (36.8  C) (Oral)   Resp 16   Ht 1.626 m (5' 4\")   Wt 77.3 kg (170 lb 8 oz)   SpO2 91%   BMI 29.27 kg/m      Gen: NAD, pleasant and cooperative  HEENT: Normocephalic, atraumatic, extra-ocular movements appear intact  Pulm: non-labored breathing in room air  Ext: no edema in BLE, no tenderness in calves  Neuro/MSK:   Orientation: Oriented to person, place, time, situation. Exhibits good insight into his/her condition and ongoing management/symptoms.  Motor: 4+/5 with right shoulder abduction, 4/5 with right elbow flexion, unable to " assess wrist flexion due to contracture. 4/5 with  strength on right. 5/5 left shoulder abduction, elbow extension, wrist extension and  strength/finger intrinsics. 4/5 with right hip flexion, 4/5 with right knee extension, 3/5 with right ankle dorsiflexion, 4+/5 with right EHL, plantar flexion. 5/5 with all muscle groups in left lower extremity.  ROM is 120 degrees with right shoulder abduction, about 130 degrees with right elbow extension.   Tone with MAS- 2/4 with right shoulder abduction, 3/4 with right finger flexors/intrinsics, 2/4 with right knee flexion. Significant right wrist contracture with minimal extension.  Sensory: intact to light touch and pinprick throughout all dermatomes in bilateral lower extremities.  Reflexes: intact, 2+ and symmetric in bilateral upper and lower extremities with biceps, triceps, BR, patellar and achilles.      Labs/Imaging:  Lab Results   Component Value Date    WBC 13.7 (H) 08/24/2021    HGB 8.0 (L) 08/24/2021    HCT 22.2 (L) 08/24/2021    MCV 94 08/24/2021     08/24/2021     Lab Results   Component Value Date     08/24/2021    POTASSIUM 3.8 08/24/2021    CHLORIDE 111 (H) 08/24/2021    CO2 22 08/24/2021    GLC 94 08/24/2021     Lab Results   Component Value Date    GFRESTIMATED >90 08/24/2021    GFRESTBLACK >90 07/10/2021     Lab Results   Component Value Date    AST 84 (H) 08/16/2021    ALT 82 (H) 08/16/2021    ALKPHOS 79 08/16/2021    BILITOTAL 4.1 (H) 08/16/2021     Lab Results   Component Value Date    INR 1.28 (H) 04/03/2021     Lab Results   Component Value Date    BUN 6 (L) 08/24/2021    CR 0.51 (L) 08/24/2021              Assessment/Plan   Jennifer Cervantes presents to clinic today for follow up reg her rehab needs. 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 Was last seen in clinic on  7/28/21. Please see plan as outlined below. As discussed with Jennifer, an orthotics referral was placed today to help patient be fitted for new bracing for both her right elbow and right knee. We discussed at length regarding botulinum toxin injections to help with ongoing spasticity in her RUE/RLE, and she would like to proceed. We discussed that in areas of previous surgical intervention and contractures, we will not improve range, but that it could benefit her in other muscles that are also spastict to improve ROM. Will discuss/confirm with Dr. Duncan and plan for her to return for first set of injections in 3-4 weeks. Patient is in agreement with this plan.        1. Therapy/equipment/braces:  1. Referral placed to Orthotics for help obtaining and reviewing new splint options for her right upper and lower extremities.   2. Continue daily stretching and range of motion to right upper and lower extremities for spasticity.  2. Interventions:  1. Will plan for RUE/RLE botulinum toxin injections to help with spastic muscles. Will plan for a total maximum dose of 400 units and anticipated starting dose of 100-150 units. Muscles that are affected based on physical exam and I will plan to inject include right biceps, BR, FDS and right biceps femoris. Will plan for repeat injections every 12 weeks and dosing/sites may be adjusted depending on response to treatment. Will submit for prior authorization and plan for patient to return for her first set of injections in 3-4 weeks.  3. Follow up: 3-4 weeks.      Katherine Pierce MD  Physical Medicine & Rehabilitation

## 2021-08-24 NOTE — Clinical Note
8/24/2021         RE: Jennifre Cervantes  [address]  [facility] [address]        Dear Colleague,    Thank you for referring your patient, Jennifer Cervantes, to the [facility]. Please see a copy of my visit note below.    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      She was admitted 2/1/21-2/3/21 with a new PE, started on Rivaroxaban. Switched to Eliquis 3/25/21 with RUE DVT.     She was admitted 4/26/21-5/11/21 with sickle cell pain crisis complicated by worsening PE in setting of low Apixaban levels, acute hypoxic respiratory failure, pneumonia, acute chest syndrome s/p exchange transfusion on 4/30 and 5/4. After 2nd exchange her oxygen requirement dramatically improved from 20L to 1-2L 5/5 and she was off oxygen as of 5/6.      She was admitted 7/13/21-7/25/21 for acute on chronic PE, switched to dabigitran + ASA.     Interval History:  Jennifer was seen today for hematology follow-up. **    Current Outpatient Medications   Medication Sig Dispense Refill     acetaminophen (TYLENOL) 325 MG tablet Take 2 tablets (650 mg) by mouth every 6 hours as needed for mild pain 120 tablet 3     albuterol (PROAIR HFA/PROVENTIL HFA/VENTOLIN HFA) 108 (90 Base) MCG/ACT inhaler Inhale 2 puffs into the lungs every 6 hours as needed for shortness of breath / dyspnea or wheezing 8.5 g 3     albuterol (PROVENTIL) (2.5 MG/3ML) 0.083% neb solution Take 1 vial (2.5 mg) by nebulization every 6 hours as needed for  shortness of breath / dyspnea or wheezing 12 mL 4     ARIPiprazole (ABILIFY) 2 MG tablet Take 1 tablet (2 mg) by mouth daily 30 tablet 3     aspirin (ASA) 81 MG chewable tablet Take 1 tablet (81 mg) by mouth daily       budesonide-formoterol (SYMBICORT) 160-4.5 MCG/ACT Inhaler Inhale 1 puff into the lungs every evening 10.2 g 3     dabigatran ANTICOAGULANT (PRADAXA) 150 MG capsule Take 1 capsule (150 mg) by mouth 2 times daily Store in original 's bottle or blister pack; use within 120 days of opening. 60 capsule 0     diclofenac (VOLTAREN) 1 % topical gel Apply 4 g topically 4 times daily as needed for moderate pain Apply to back, legs, and arms for pain 150 g 3     diphenhydrAMINE (BENADRYL) 25 MG capsule Take 1-2 capsules (25-50 mg) by mouth nightly as needed for sleep 60 capsule 3     EPINEPHrine (ANY BX GENERIC EQUIV) 0.3 MG/0.3ML injection 2-pack Inject 0.3 mLs (0.3 mg) into the muscle as needed for anaphylaxis 1 each 1     Hydroxyurea 1000 MG TABS Take 2,000 mg by mouth daily 60 tablet 3     hydrOXYzine (ATARAX) 25 MG tablet Take 1 tablet (25 mg) by mouth 3 times daily as needed for anxiety 30 tablet 1     JADENU 360 MG tablet Take 4 tablets (1,440 mg) by mouth every evening 120 tablet 4     lidocaine-prilocaine (EMLA) 2.5-2.5 % external cream Apply topically as needed for moderate pain 30 g 1     medroxyPROGESTERone (DEPO-PROVERA) 150 MG/ML IM injection Inject 150 mg into the muscle       naloxone (NARCAN) 4 MG/0.1ML nasal spray Spray 1 spray (4 mg) into one nostril alternating nostrils once as needed for opioid reversal every 2-3 minutes until assistance arrives 0.2 mL 1     omeprazole (PRILOSEC) 20 MG DR capsule Take 1 capsule (20 mg) by mouth daily 30 capsule 1     ondansetron (ZOFRAN) 8 MG tablet Take 8 mg by mouth every 8 hours as needed        oxyCODONE (OXYCONTIN) 10 MG 12 hr tablet Take 1 tablet (10 mg) by mouth every 12 hours 60 tablet 0     oxyCODONE IR (ROXICODONE) 15 MG tablet Take 1  tablet (15mg) by mouth every 4-6 hours as needed for severe pain. Goal 4 per day. Max 6 per day. 60 tablet 0     sertraline (ZOLOFT) 100 MG tablet Take 2 tablets (200 mg) by mouth daily 180 tablet 3       Physical Examination:  There were no vitals taken for this visit.  Wt Readings from Last 10 Encounters:   08/19/21 76.7 kg (169 lb)   08/16/21 76.7 kg (169 lb)   08/16/21 77.1 kg (170 lb)   08/15/21 77.1 kg (170 lb)   08/12/21 74.8 kg (165 lb)   08/10/21 74.8 kg (165 lb)   08/08/21 74.8 kg (165 lb)   08/07/21 75.2 kg (165 lb 12.6 oz)   08/04/21 75.2 kg (165 lb 11.2 oz)   08/03/21 77.1 kg (170 lb)     Constitutional: Well-appearing female in no acute distress.  Eyes: EOMI, PERRL. No scleral icterus.  ENT: Oral mucosa is moist without lesions or thrush.   Lymphatic: Neck is supple without cervical or supraclavicular lymphadenopathy. No axillary lymphadenopathy.  Cardiovascular: Regular rate and rhythm. No murmurs, gallops, or rubs. No peripheral edema.  Respiratory: Clear to auscultation bilaterally. No wheezes or crackles.  Gastrointestinal: Bowel sounds present. Abdomen soft, non-tender. No palpable hepatosplenomegaly or masses.   Neurologic: Cranial nerves II through XII are grossly intact.  Skin: No rashes, petechiae, or bruising noted on exposed skin.    Laboratory Data:        Assessment and Plan:  * Start Cymblata and stop Zoloft.  * Dr. Perla Shrestha and MS Contin  * Headache referral  *Accupuncture Dr. Pierce  *Check port tomorrow         Andrei Machado PA-C  [facility]  [address], [place] [id number] [phone]        Again, thank you for allowing me to participate in the care of your patient.        Sincerely,        RONALDO Allan

## 2021-08-24 NOTE — NURSING NOTE
Chief Complaint   Patient presents with     Port Draw     Labs drawn via PORT by RN in lab, VS taken.      Kaveh Ellsworth RN

## 2021-08-24 NOTE — PROGRESS NOTES
Infusion Nursing Note:  Jennifer Cervantes presents today for add-on infusions.    Patient seen by provider today: Yes: Andrei Machado   present during visit today: Not Applicable.    Note: Labs were monitored.      Intravenous Access:  Implanted Port.  Provider will reassess site tomorrow.    Treatment Conditions:  Per Provider order, Patient received crizanlizumab infusion over one hour.  She received IV fluids over two hours.  Patient received three doses of 2 mg. Morphine, each spaced one hour apart.      Post Infusion Assessment:  Patient tolerated infusion without incident.  She reported partial relief from the Morphine and was satisfied with today's treatment.       Discharge Plan:   Patient discharged in stable condition accompanied by: family.      ELINA WINTER RN

## 2021-08-25 ENCOUNTER — ALLIED HEALTH/NURSE VISIT (OUTPATIENT)
Dept: INTERNAL MEDICINE | Facility: CLINIC | Age: 21
End: 2021-08-25
Payer: COMMERCIAL

## 2021-08-25 ENCOUNTER — ONCOLOGY VISIT (OUTPATIENT)
Dept: ONCOLOGY | Facility: CLINIC | Age: 21
End: 2021-08-25
Attending: STUDENT IN AN ORGANIZED HEALTH CARE EDUCATION/TRAINING PROGRAM
Payer: COMMERCIAL

## 2021-08-25 VITALS
HEIGHT: 64 IN | RESPIRATION RATE: 16 BRPM | OXYGEN SATURATION: 91 % | TEMPERATURE: 98.3 F | BODY MASS INDEX: 29.11 KG/M2 | DIASTOLIC BLOOD PRESSURE: 83 MMHG | SYSTOLIC BLOOD PRESSURE: 137 MMHG | WEIGHT: 170.5 LBS | HEART RATE: 104 BPM

## 2021-08-25 DIAGNOSIS — R25.2 SPASTICITY: ICD-10-CM

## 2021-08-25 DIAGNOSIS — G81.10 SPASTIC HEMIPLEGIA, UNSPECIFIED ETIOLOGY, UNSPECIFIED LATERALITY (H): ICD-10-CM

## 2021-08-25 DIAGNOSIS — R29.898 RIGHT LEG WEAKNESS: ICD-10-CM

## 2021-08-25 DIAGNOSIS — D57.1 HB-SS DISEASE WITHOUT CRISIS (H): ICD-10-CM

## 2021-08-25 DIAGNOSIS — Z30.42 ENCOUNTER FOR SURVEILLANCE OF INJECTABLE CONTRACEPTIVE: Primary | ICD-10-CM

## 2021-08-25 DIAGNOSIS — I69.351 HEMIPLEGIA AND HEMIPARESIS FOLLOWING CEREBRAL INFARCTION AFFECTING RIGHT DOMINANT SIDE (H): Primary | ICD-10-CM

## 2021-08-25 PROCEDURE — 99214 OFFICE O/P EST MOD 30 MIN: CPT | Performed by: STUDENT IN AN ORGANIZED HEALTH CARE EDUCATION/TRAINING PROGRAM

## 2021-08-25 PROCEDURE — 96372 THER/PROPH/DIAG INJ SC/IM: CPT | Performed by: PEDIATRICS

## 2021-08-25 PROCEDURE — G0463 HOSPITAL OUTPT CLINIC VISIT: HCPCS

## 2021-08-25 RX ADMIN — MEDROXYPROGESTERONE ACETATE 150 MG: 150 INJECTION, SUSPENSION INTRAMUSCULAR at 08:56

## 2021-08-25 ASSESSMENT — MIFFLIN-ST. JEOR: SCORE: 1518.38

## 2021-08-25 ASSESSMENT — PAIN SCALES - GENERAL: PAINLEVEL: NO PAIN (0)

## 2021-08-25 NOTE — NURSING NOTE
"Oncology Rooming Note    August 25, 2021 7:56 AM   Jennifer Cervantes is a 21 year old female who presents for:    Chief Complaint   Patient presents with     Oncology Clinic Visit     RIGHT SIDED WEAKNESS - SICKLE CELL      Initial Vitals: /83   Pulse 104   Temp 98.3  F (36.8  C) (Oral)   Resp 16   Ht 1.626 m (5' 4\")   Wt 77.3 kg (170 lb 8 oz)   SpO2 91%   BMI 29.27 kg/m   Estimated body mass index is 29.27 kg/m  as calculated from the following:    Height as of this encounter: 1.626 m (5' 4\").    Weight as of this encounter: 77.3 kg (170 lb 8 oz). Body surface area is 1.87 meters squared.  No Pain (0) Comment: Data Unavailable   No LMP recorded. Patient has had an injection.  Allergies reviewed: Yes  Medications reviewed: Yes    Medications: Medication refills not needed today.  Pharmacy name entered into EPIC:    [facility] - [place], [place] - [address] [place] [address] [phone]  [facility] - [place], [place] - [address] [place] [address]    Clinical concerns: Oxygen level was brought to providers attention. Index finger reads 90% so I moved it to the middle finger and patient maintains at 91%. Patient denies being symptomatic.     Dee Dee Lee CMA August 25, 2021  7:56 AM '            "

## 2021-08-25 NOTE — DISCHARGE SUMMARY
[facility]  Hospitalist Discharge Summary      Date of Admission:  7/13/2021  Date of Discharge:  7/25/2021 10:55 AM  Discharging Provider: Suraj Aguillon MD  Discharge Team: Hospitalist Service, Gold 8    Discharge Diagnoses   Sickle cell anemia with acute pain crisis  Hypoxic respiratory failure likely secondary to acute PE and community acquired pneumonia  Asthma with acute exacerbation  Hypokalemia     Follow-ups Needed After Discharge   Follow-up Appointments     Adult [facility] Follow-up and recommended labs and tests      Follow up with primary care provider, Suraj Case, within 7 days   for hospital follow- up.  No follow up labs or test are needed.      Appointments on [place] and/or [facility] (with [facility] or [facility]   provider or service). Call [phone] if you haven't heard regarding   these appointments within 7 days of discharge.             Unresulted Labs Ordered in the Past 30 Days of this Admission     No orders found from 6/13/2021 to 7/14/2021.      These results will be followed up by N/A    Discharge Disposition   Discharged to home  Condition at discharge: Stable      Hospital Course   Jennifer Cervantes is a 21 year old female admitted on 7/13/2021. She has a past medical history significant for sickle cell anemia, history of stroke with right upper extremity hemiparesis, venous thrombosis/PE on xarelto, anxiety, depression, and asthma who presented to the ED from infusion clinic after found to be hypoxic and complains of myalgias, shortness of breath, cough and rhinorrhea.      # Acute hypoxic respiratory failure  # Acute on chronic pulmonary embolism  # Sepsis due likely to atypical pneumonia (resolving)  Presenting with myalgias, SOB, cough. Afebrile. Noted to be hypoxic to 80s at infusion clinic. Has history of acute chest syndrome requiring exchange transfusions as well as previous PE while on Xarelto. NM scan this admission  showed a new acute PE as well. Hematology was consulted an she was started on dabigatran and restarted ASA at discharge.  Additionally she was given antibiotics (levaquin) to complete a 7 day course given a concern for pneumonia (hypoxia, elevated white count, possible infiltrate).  Her presentation improved thus it was felt her symptoms were unlikely related to acute chest.  She underwent a walk test at the time of discharge and she was no longer hypoxic.         # Acute Asthma Exacerbation  PTA on symbicort, albuterol. Infrequent exacerbations suggesting her asthma is well managed generally.  Likely worsened in the context of pneumonia.  She was treated with her home meds and albuterol nebs prn.  She improved at the time of discharge, no oral steroids were felt to be needed.      # Sickle cell anemia with pain crisis  Likely from acute PE and possible pneumonia.  She was started on her PCA at her regular dose.  She weaned down with treatment and was discharged on her home pain regimen, hydrea and jadenu.      Hypokalemia (resolved)  Potassium low in setting of poor appetite today while feeling unwell.  - K replacement protocol    Consultations This Hospital Stay   PHARMACY TO DOSE VANCO  VASCULAR ACCESS CARE ADULT IP CONSULT  HEMATOLOGY ADULT IP CONSULT  PHARMACY IP CONSULT  PHARMACY IP CONSULT  CARE MANAGEMENT / SOCIAL WORK IP CONSULT    Code Status   Prior    Time Spent on this Encounter   I, Suraj Aguillon MD, personally saw the patient today and spent greater than 30 minutes discharging this patient.       Suraj Aguillon MD  [facility] [address] [address]  Phone: [phone]  ______________________________________________________________________    Physical Exam   Vital Signs:                     Afebrile and stable  Weight: 171 lbs 11.81 oz  General Appearance: NAD  Respiratory: mild expiratory wheezes, normal work of breathing  Cardiovascular: RRR  S1/S2, no m,,rg  GI: soft, NT, ND, +BS  Skin: no rashes  Other: No edema        Primary Care Physician   Suraj Caes    Discharge Orders      Ferritin     Comprehensive metabolic panel     CBC with platelets and differential    Last Lab Result: Hemoglobin (g/dL)  Date                     Value  02/28/2021               7.7 (L)  ----------     Manual Differential    Last Lab Result: Hemoglobin (g/dL)  Date                     Value  02/28/2021               7.7 (L)  ----------     Reason for your hospital stay    You were admitted with an acute pain crisis and low oxygen.  The low oxygen was thought to be caused by a combination of things: a pulmonary embolism (a clot which traveled to the lungs), an asthma exacerbation, and a mild pneumonia.  You had your blood thinner changed, were given some nebulizers, and started on antibiotics.  Your labs, Oxygen, and pain improved and you were felt to be safe for discharge with close follow up with your hematology team.     Activity    Your activity upon discharge: activity as tolerated     Adult [facility] Follow-up and recommended labs and tests    Follow up with primary care provider, Suraj Case, within 7 days for hospital follow- up.  No follow up labs or test are needed.      Appointments on [place] and/or [facility] (with [facility] or [facility] provider or service). Call [phone] if you haven't heard regarding these appointments within 7 days of discharge.     When to contact your care team    Call your primary doctor if you have any of the following: fever, chills, difficulty breathing, worsening or uncontrolled pain, worsening cough, dizziness, lightheadedness, leg pain or swelling, chest pain, or other symptoms.     Discharge Instructions    Complete your antibiotics as prescribed. You will be switched to a new blood thinner (Pradaxa), so please stop taking and discard the xarelto (do not take together).  You are given two pills of oxycontin to bridge  til tomorrow.  If you have difficulty obtaining, please speak with your primary.  If you have any pain, difficulty breathing, or other symptoms please call your hematologist.  Finally, speak to your hematologist about getting a sleep study to rule out sleep apnea which may be contributing to your symptoms.     Diet    Follow this diet upon discharge: Orders Placed This Encounter      Combination Diet Regular Diet Adult     CBC with platelets differential    Last Lab Result: Hemoglobin (g/dL)  Date                     Value  02/28/2021               7.7 (L)  ----------       Significant Results and Procedures   Most Recent 3 CBC's:Recent Labs   Lab Test 08/24/21  1203 08/19/21  0218 08/16/21  1119   WBC 13.7* 16.1* 13.8*   HGB 8.0* 8.0* 7.7*   MCV 94 97 91    301 286     Most Recent 3 BMP's:Recent Labs   Lab Test 08/24/21  1202 08/16/21  1119 08/16/21  0103    140 139   POTASSIUM 3.8 3.9 4.1   CHLORIDE 111* 110* 106   CO2 22 22 23   BUN 6* 9 9   CR 0.51* 0.56 0.68   ANIONGAP 6 8 10   MICAH 8.7 8.9 8.4*   GLC 94 97 101*     Most Recent 2 LFT's:Recent Labs   Lab Test 08/16/21  1119 08/16/21  0103   AST 84* 92*   ALT 82* 87*   ALKPHOS 79 82   BILITOTAL 4.1* 3.3*   ,   Results for orders placed or performed during the hospital encounter of 07/13/21   XR Chest Port 1 View    Narrative    XR CHEST PORT 1 VIEW  7/13/2021 4:43 PM      HISTORY: hypoxia. Sickle cell disease.    COMPARISON: 7/13/2021    FINDINGS:   Single AP view the chest. Left chest port tip overlying the superior  cavoatrial junction. Trachea is midline. Cardiac silhouette is similar  in size. No pneumothorax or significant pleural effusion. Mildly  decreased hazy bibasilar opacities.      Impression    IMPRESSION:   Mildly decreased hazy bibasilar opacities, favored atelectasis versus  less likely infection.    I have personally reviewed the examination and initial interpretation  and I agree with the findings.    MANSI GAFFNEY MD         SYSTEM  ID:  [id number]   NM Lung Scan Perfusion Particulate     Value    Radiologist flags Probable acute on chronic pulmonary emboli. (Urgent)    Addendum: 7/14/2021    Addendum: Dr. Garcia was not the staff on 7/13/2021.    BRANDEN GARCIA MD         SYSTEM ID:  [id number]      Narrative    Exam: NM LUNG SCAN PERFUSION PARTICULATE, 7/13/2021 10:53 PM    Indication: PE suspected, high prob; PE suspected, high prob    Comparison: CT scan from 4/27/2021    Radiotracer:  7 mCi 99mTc-MAA     Findings:   Perfusion only images are obtained, planar and SPECT-CT. SPECT-CT is  used for localization.    Multifocal segmental and subsegmental defects noted in both lungs,  most of which were present on 4/27/2021. There is at least one new  segmental perfusion defect, most notable in the left apicoposterior  segment.    On SPECT-CT, the lungs appear clear, no consolidation, significant  atelectasis, or pleural effusions. Upper abdominal retroperitoneal  lymphadenopathy again demonstrated.      Impression    Impression:   1. At least one new segmental perfusion defect in the left upper lobe  apicoposterior segment on a background of known subsegmental and  segmental perfusion defects, suggestive of acute on chronic pulmonary  emboli.    2. Clear lungs on SPECT-CT.    [Urgent Result: Probable acute on chronic pulmonary emboli.]    Finding was identified on 7/13/2021 10:36 PM.     Dr. Roper was contacted by Dr. Cobb at 7/13/2021 10:45 PM and  verbalized understanding of the urgent finding.      I have personally reviewed the examination and initial interpretation  and I agree with the findings.    BRANDEN GARCIA MD         SYSTEM ID:  [id number]   XR Chest Port 1 View    Narrative    EXAM: XR CHEST PORT 1 VIEW  LOCATION: [facility]  DATE/TIME: 7/14/2021 4:53 AM    INDICATION: SOB  COMPARISON: Yesterday.      Impression    IMPRESSION: Left internal jugular venous Port-A-Cath terminates near the superior cavoatrial  junction. Mild elevation of the right hemidiaphragm. Lungs appear clear. No pleural fluid or pneumothorax. Normal heart size and pulmonary vascularity.   XR Chest Port 1 View    Narrative    EXAM: XR CHEST PORT 1 VIEW  7/20/2021 1:41 PM      HISTORY: Acute on chronic hypoxia, sickle cell disease    COMPARISON: 7/14/2021, chest CT 5/3/2021    FINDINGS:   Single AP chest radiograph. Left distal Port-A-Cath tip projects over  the low SVC. Trachea is midline, heart size is normal. No focal  pulmonary opacity, pneumothorax, or pleural effusion. No acute osseous  or abdominal abnormality.        Impression    IMPRESSION:   Mild perihilar atelectasis versus pulmonary vascular congestion. No  focal airspace opacities.         I have personally reviewed the examination and initial interpretation  and I agree with the findings.    ANGELITA MARROQUIN MD         SYSTEM ID:  [id number]   XR Chest Port 1 View    Narrative    EXAM: XR CHEST PORT 1 VIEW 7/21/2021 2:50 PM      HISTORY: Pt new cough, increased oxygen and white count.    COMPARISON: Chest radiograph 7/20/2021.     TECHNIQUE: Frontal view of the chest.    FINDINGS: Frontal radiograph of the chest. Port-A-Cath left chest wall  with tip projecting over the lower SVC, similar to prior. Trachea is  midline. Cardiac silhouette is within normal limits.  Pulmonary  vasculature is distinct.  Unchanged mild bilateral perihilar  opacities. No visualized pleural effusion. No appreciable  pneumothorax. No acute osseous abnormality.       Impression    IMPRESSION: _  No acute focal airspace opacities. Unchanged mild bilateral perihilar  opacities, could represent atelectasis versus pulmonary edema.    I have personally reviewed the examination and initial interpretation  and I agree with the findings.    KEN BARBOUR DO         SYSTEM ID:  [id number]       Discharge Medications   Discharge Medication List as of 7/25/2021  9:42 AM      START taking these medications    Details   aspirin  (ASA) 81 MG chewable tablet Take 1 tablet (81 mg) by mouth daily, No Print Out      dabigatran ANTICOAGULANT (PRADAXA) 150 MG capsule Take 1 capsule (150 mg) by mouth 2 times daily Store in original 's bottle or blister pack; use within 120 days of opening., Disp-60 capsule, R-0, E-Prescribe      levofloxacin (LEVAQUIN) 500 MG tablet Take 1 tablet (500 mg) by mouth daily for 4 days, Disp-4 tablet, R-0, E-Prescribe         CONTINUE these medications which have CHANGED    Details   oxyCODONE (OXYCONTIN) 10 MG 12 hr tablet Take 1 tablet (10 mg) by mouth every 12 hours, Disp-2 tablet, R-0, Local Print         CONTINUE these medications which have NOT CHANGED    Details   acetaminophen (TYLENOL) 325 MG tablet Take 2 tablets (650 mg) by mouth every 6 hours as needed for mild pain, Disp-120 tablet, R-3, E-Prescribe      albuterol (PROAIR HFA/PROVENTIL HFA/VENTOLIN HFA) 108 (90 Base) MCG/ACT inhaler Inhale 2 puffs into the lungs every 6 hours as needed for shortness of breath / dyspnea or wheezing, Disp-8.5 g, R-3, E-PrescribePharmacy may dispense brand covered by insurance (Proair, or proventil or ventolin or generic albuterol inhaler)      albuterol (PROVENTIL) (2.5 MG/3ML) 0.083% neb solution Take 1 vial (2.5 mg) by nebulization every 6 hours as needed for shortness of breath / dyspnea or wheezing, Disp-12 mL, R-4, E-Prescribe      ARIPiprazole (ABILIFY) 2 MG tablet Take 1 tablet (2 mg) by mouth daily, Disp-30 tablet, R-3, E-Prescribe      budesonide-formoterol (SYMBICORT) 160-4.5 MCG/ACT Inhaler Inhale 1 puff into the lungs every evening, Disp-10.2 g, R-3, Historical      diclofenac (VOLTAREN) 1 % topical gel Apply 4 g topically 4 times daily as needed for moderate pain Apply to back, legs, and arms for pain, Disp-150 g, R-3, E-Prescribe      diphenhydrAMINE (BENADRYL) 25 MG capsule Take 1-2 capsules (25-50 mg) by mouth nightly as needed for sleep, Disp-60 capsule, R-3, E-Prescribe      EPINEPHrine (ANY BX  GENERIC EQUIV) 0.3 MG/0.3ML injection 2-pack Inject 0.3 mLs (0.3 mg) into the muscle as needed for anaphylaxis, Disp-1 each, R-1, E-Prescribe      Hydroxyurea 1000 MG TABS Take 2,000 mg by mouth daily, Disp-60 tablet, R-3, E-Prescribe      hydrOXYzine (ATARAX) 25 MG tablet Take 1 tablet (25 mg) by mouth 3 times daily as needed for anxiety, Disp-30 tablet, R-1, E-Prescribe      JADENU 360 MG tablet Take 4 tablets (1,440 mg) by mouth every evening, Disp-120 tablet, R-4, SHIELA, E-Prescribe      lidocaine-prilocaine (EMLA) 2.5-2.5 % external cream Apply topically as needed for moderate painDisp-30 g, [id number]      naloxone (NARCAN) 4 MG/0.1ML nasal spray Spray 1 spray (4 mg) into one nostril alternating nostrils once as needed for opioid reversal every 2-3 minutes until assistance arrives, Disp-0.2 mL, R-1, E-Prescribe      ondansetron (ZOFRAN) 8 MG tablet Take 8 mg by mouth every 8 hours as needed , Historical      oxyCODONE IR (ROXICODONE) 15 MG tablet Take 1 tablet (15mg) by mouth every 4-6 hours as needed for severe pain. Goal 4 per day. Max 6 per day., Disp-60 tablet, R-0, E-Prescribe      sertraline (ZOLOFT) 100 MG tablet Take 2 tablets (200 mg) by mouth daily, Disp-180 tablet, R-3, E-Prescribe      medroxyPROGESTERone (DEPO-PROVERA) 150 MG/ML IM injection Inject 150 mg into the muscle, Historical         STOP taking these medications       rivaroxaban ANTICOAGULANT (XARELTO ANTICOAGULANT) 20 MG TABS tablet Comments:   Reason for Stopping:             Allergies   Allergies   Allergen Reactions     Contrast Dye      Hives and breathing issues     Fish-Derived Products Hives     Seafood Hives     Diagnostic X-Ray Materials      Gadolinium

## 2021-08-25 NOTE — LETTER
8/25/2021         RE: Jennifer Cervantes  [address]  [facility] [address]        Dear Colleague,    Thank you for referring your patient, Jennifer Cervantes, to the [facility]. Please see a copy of my visit note below.    [facility]   PM&R clinic note        Interval history:     Jennifer Cervantes presents to clinic today for follow up reg her rehab needs.   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  Was last seen in clinic on 7/28/21.  Recommendations included:  1. Therapy/equipment/braces:  1. Will discuss with Orthotics regarding splint options for RUE/RLE.  2. Interventions:  1. Patient is interested in botulinum toxin injections to see if this can help improve her range in RUE/RLE. I discussed that in areas of previous surgical intervention and contractures, we will not improve range, but that it could benefit her in other muscles that are tight to improve ROM. Will discuss this further PM&R colleagues to evaluate risks/benefits of trying botulinum toxin in the setting of her medical co-morbidities and will confirm this with the patient by phone.  3. Follow up: 3-4 weeks.      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    Symptoms,  Patient was seen this morning for a return visit. She is doing ok this morning. She states that she continues to have significant pain over the weekends due to her sickle cell crisis and has had to have a few more ED visits because of this. Her Hematology team is currently working on optimizing her pain medication regimen.  Patient brought in her elbow brace today, as she would like to find bracing for her right elbow and right knee that allows for some range  while giving her more stability. She feels like her current orthotics are not working well.  She is also interested in acupuncture as a possible modality to help with pain relief as she has tried this in the past.   Lastly, she is still interested in trying botulinum toxin injections for some of her right upper extremity and lower extremity muscles to help improve her range in the setting of spasticity. 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       Therapies/HEP,  Patient continues with home stretching program to spastic right upper/lower extremity.      Functionally,   No changes since her last visit.      Social history is unchanged.          Medications:  Current Outpatient Medications   Medication Sig Dispense Refill     acetaminophen (TYLENOL) 325 MG tablet Take 2 tablets (650 mg) by mouth every 6 hours as needed for mild pain 120 tablet 3     albuterol (PROAIR HFA/PROVENTIL HFA/VENTOLIN HFA) 108 (90 Base) MCG/ACT inhaler Inhale 2 puffs into the lungs every 6 hours as needed for shortness of breath / dyspnea or wheezing 8.5 g 3     albuterol (PROVENTIL) (2.5 MG/3ML) 0.083% neb solution Take 1 vial (2.5 mg) by nebulization every 6 hours as needed for shortness of breath / dyspnea or wheezing 12 mL 4     ARIPiprazole (ABILIFY) 2 MG tablet Take 1 tablet (2 mg) by mouth daily 30 tablet 3     aspirin (ASA) 81 MG chewable tablet Take 1 tablet (81 mg) by mouth daily       budesonide-formoterol (SYMBICORT) 160-4.5 MCG/ACT Inhaler Inhale 1 puff into the lungs every evening 10.2 g 3     dabigatran ANTICOAGULANT (PRADAXA) 150 MG capsule Take 1 capsule (150 mg) by mouth 2 times daily Store in original 's bottle or blister pack; use within 120 days of opening. 60 capsule 0     diclofenac (VOLTAREN) 1 % topical gel Apply 4 g topically 4 times daily as needed for moderate pain Apply to back, legs, and arms for pain 150 g 3     diphenhydrAMINE  "(BENADRYL) 25 MG capsule Take 1-2 capsules (25-50 mg) by mouth nightly as needed for sleep 60 capsule 3     DULoxetine (CYMBALTA) 30 MG capsule Take 1 capsule (30 mg) by mouth daily for 7 days, THEN 1 capsule (30 mg) 2 times daily. 187 capsule 0     Hydroxyurea 1000 MG TABS Take 2,000 mg by mouth daily 60 tablet 3     hydrOXYzine (ATARAX) 25 MG tablet Take 1 tablet (25 mg) by mouth 3 times daily as needed for anxiety 30 tablet 1     JADENU 360 MG tablet Take 4 tablets (1,440 mg) by mouth every evening 120 tablet 4     lidocaine-prilocaine (EMLA) 2.5-2.5 % external cream Apply topically as needed for moderate pain 30 g 1     omeprazole (PRILOSEC) 20 MG DR capsule Take 1 capsule (20 mg) by mouth daily 30 capsule 1     ondansetron (ZOFRAN) 8 MG tablet Take 8 mg by mouth every 8 hours as needed        oxyCODONE (OXYCONTIN) 10 MG 12 hr tablet Take 1 tablet (10 mg) by mouth every 12 hours 60 tablet 0     oxyCODONE IR (ROXICODONE) 15 MG tablet Take 1 tablet (15mg) by mouth every 4-6 hours as needed for severe pain. Goal 4 per day. Max 6 per day. 60 tablet 0     EPINEPHrine (ANY BX GENERIC EQUIV) 0.3 MG/0.3ML injection 2-pack Inject 0.3 mLs (0.3 mg) into the muscle as needed for anaphylaxis (Patient not taking: Reported on 8/25/2021) 1 each 1     medroxyPROGESTERone (DEPO-PROVERA) 150 MG/ML IM injection Inject 150 mg into the muscle       naloxone (NARCAN) 4 MG/0.1ML nasal spray Spray 1 spray (4 mg) into one nostril alternating nostrils once as needed for opioid reversal every 2-3 minutes until assistance arrives (Patient not taking: Reported on 8/25/2021) 0.2 mL 1              Physical Exam:   /83   Pulse 104   Temp 98.3  F (36.8  C) (Oral)   Resp 16   Ht 1.626 m (5' 4\")   Wt 77.3 kg (170 lb 8 oz)   SpO2 91%   BMI 29.27 kg/m      Gen: NAD, pleasant and cooperative  HEENT: Normocephalic, atraumatic, extra-ocular movements appear intact  Pulm: non-labored breathing in room air  Ext: no edema in BLE, no " tenderness in calves  Neuro/MSK:   Orientation: Oriented to person, place, time, situation. Exhibits good insight into his/her condition and ongoing management/symptoms.  Motor: 4+/5 with right shoulder abduction, 4/5 with right elbow flexion, unable to assess wrist flexion due to contracture. 4/5 with  strength on right. 5/5 left shoulder abduction, elbow extension, wrist extension and  strength/finger intrinsics. 4/5 with right hip flexion, 4/5 with right knee extension, 3/5 with right ankle dorsiflexion, 4+/5 with right EHL, plantar flexion. 5/5 with all muscle groups in left lower extremity.  ROM is 120 degrees with right shoulder abduction, about 130 degrees with right elbow extension.   Tone with MAS- 2/4 with right shoulder abduction, 3/4 with right finger flexors/intrinsics, 2/4 with right knee flexion. Significant right wrist contracture with minimal extension.  Sensory: intact to light touch and pinprick throughout all dermatomes in bilateral lower extremities.  Reflexes: intact, 2+ and symmetric in bilateral upper and lower extremities with biceps, triceps, BR, patellar and achilles.      Labs/Imaging:  Lab Results   Component Value Date    WBC 13.7 (H) 08/24/2021    HGB 8.0 (L) 08/24/2021    HCT 22.2 (L) 08/24/2021    MCV 94 08/24/2021     08/24/2021     Lab Results   Component Value Date     08/24/2021    POTASSIUM 3.8 08/24/2021    CHLORIDE 111 (H) 08/24/2021    CO2 22 08/24/2021    GLC 94 08/24/2021     Lab Results   Component Value Date    GFRESTIMATED >90 08/24/2021    GFRESTBLACK >90 07/10/2021     Lab Results   Component Value Date    AST 84 (H) 08/16/2021    ALT 82 (H) 08/16/2021    ALKPHOS 79 08/16/2021    BILITOTAL 4.1 (H) 08/16/2021     Lab Results   Component Value Date    INR 1.28 (H) 04/03/2021     Lab Results   Component Value Date    BUN 6 (L) 08/24/2021    CR 0.51 (L) 08/24/2021              Assessment/Plan   Jennifer Cervantes presents to clinic today for follow up reg  her rehab needs. 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 Was last seen in clinic on 7/28/21. Please see plan as outlined below. As discussed with Jennifer, an orthotics referral was placed today to help patient be fitted for new bracing for both her right elbow and right knee. We discussed at length regarding botulinum toxin injections to help with ongoing spasticity in her RUE/RLE, and she would like to proceed. We discussed that in areas of previous surgical intervention and contractures, we will not improve range, but that it could benefit her in other muscles that are also spastict to improve ROM. Will discuss/confirm with Dr. Duncan and plan for her to return for first set of injections in 3-4 weeks. Patient is in agreement with this plan.        1. Therapy/equipment/braces:  1. Referral placed to Orthotics for help obtaining and reviewing new splint options for her right upper and lower extremities.   2. Continue daily stretching and range of motion to right upper and lower extremities for spasticity.  2. Interventions:  1. Will plan for RUE/RLE botulinum toxin injections to help with spastic muscles. Will plan for a total maximum dose of 400 units and anticipated starting dose of 100-150 units. Muscles that are affected based on physical exam and I will plan to inject include right biceps, BR, FDS and right biceps femoris. Will plan for repeat injections every 12 weeks and dosing/sites may be adjusted depending on response to treatment. Will submit for prior authorization and plan for patient to return for her first set of injections in 3-4 weeks.  3. Follow up: 3-4 weeks.      Katherine Pierce MD  Physical Medicine & Rehabilitation                    Again, thank you for allowing me to participate in the care of your patient.         Sincerely,        Katherine Pierce MD

## 2021-08-25 NOTE — PATIENT INSTRUCTIONS
1. A referral has been placed to Orthotics to help with new braces for your right elbow and right knee.  2. We will plan for botulinum toxin injections to tight muscles in your right arm and right leg to see if this helps with your range of motion and discomfort. Dr. Pierce will reach out to Dr. Duncan to update him on the plan and ensure there are no contraindications to this plan.  3. Return to clinic with Dr. Pierce in 1 month.

## 2021-08-25 NOTE — PROGRESS NOTES
Jennifer Cervantes comes into clinic today at the request of [employer] for DEPO SHOT.      This service provided today was under the direct supervision of DR. GARZA, who was available if needed.    Jaida Martell, EMT at 8:58 AM on 8/25/2021

## 2021-08-26 NOTE — PROGRESS NOTES
This is a recent snapshot of the patient's [facility] medical record.  For current drug dose and complete information and questions, call [phone] or In Basket pool, [facility] ([id number])  CSN Number:  [id number]

## 2021-08-27 ENCOUNTER — TELEPHONE (OUTPATIENT)
Dept: ONCOLOGY | Facility: CLINIC | Age: 21
End: 2021-08-27

## 2021-08-27 NOTE — TELEPHONE ENCOUNTER
Jennifer Calls in today wondering what she should do if her pain becomes bad over the weekend because they are trying to keep her out of the ER and hospital     Per Andrei HIGGINS note on 8/24/21  Pain Control: Continue OxyContin 10mg BID. Continue Oxycodone to 15mg PRN max 6 per day. Continue Tylenol PRN, and Celebrex PRN. OK to use Voltaren gel as needed. Discussed adding neuropathic agent as adjunct-she didn't find any relief with gabapentin or lyrica in the past but is agreeable to trying Cymbalta. Will do 30mg daily x 1 week, then increase to 30mg BID.    Jennifer states that she has increased her cymbalta to 30 mg BID.     If this does not control her pain over the weekend then she will need to be seen in the ER.

## 2021-08-28 ENCOUNTER — NURSE TRIAGE (OUTPATIENT)
Dept: NURSING | Facility: CLINIC | Age: 21
End: 2021-08-28

## 2021-08-29 ENCOUNTER — HOSPITAL ENCOUNTER (EMERGENCY)
Facility: CLINIC | Age: 21
Discharge: HOME | End: 2021-08-29
Attending: EMERGENCY MEDICINE | Admitting: EMERGENCY MEDICINE
Payer: COMMERCIAL

## 2021-08-29 ENCOUNTER — APPOINTMENT (OUTPATIENT)
Dept: CARDIOLOGY | Facility: CLINIC | Age: 21
End: 2021-08-29
Attending: EMERGENCY MEDICINE
Payer: COMMERCIAL

## 2021-08-29 ENCOUNTER — APPOINTMENT (OUTPATIENT)
Dept: GENERAL RADIOLOGY | Facility: CLINIC | Age: 21
End: 2021-08-29
Attending: EMERGENCY MEDICINE
Payer: COMMERCIAL

## 2021-08-29 VITALS
HEIGHT: 64 IN | DIASTOLIC BLOOD PRESSURE: 70 MMHG | WEIGHT: 170 LBS | HEART RATE: 97 BPM | RESPIRATION RATE: 16 BRPM | BODY MASS INDEX: 29.02 KG/M2 | TEMPERATURE: 98.5 F | OXYGEN SATURATION: 96 % | SYSTOLIC BLOOD PRESSURE: 118 MMHG

## 2021-08-29 DIAGNOSIS — D57.00 SICKLE CELL PAIN CRISIS (H): ICD-10-CM

## 2021-08-29 LAB
ALBUMIN SERPL-MCNC: 3.6 G/DL (ref 3.4–5)
ALBUMIN UR-MCNC: NEGATIVE MG/DL
ALP SERPL-CCNC: 72 U/L (ref 40–150)
ALT SERPL W P-5'-P-CCNC: 61 U/L (ref 0–50)
ANION GAP SERPL CALCULATED.3IONS-SCNC: 10 MMOL/L (ref 3–14)
APPEARANCE UR: CLEAR
AST SERPL W P-5'-P-CCNC: 74 U/L (ref 0–45)
ATRIAL RATE - MUSE: 98 BPM
BACTERIA #/AREA URNS HPF: ABNORMAL /HPF
BASOPHILS # BLD AUTO: 0.2 10E3/UL (ref 0–0.2)
BASOPHILS NFR BLD AUTO: 1 %
BILIRUB SERPL-MCNC: 3.1 MG/DL (ref 0.2–1.3)
BILIRUB UR QL STRIP: NEGATIVE
BUN SERPL-MCNC: 8 MG/DL (ref 7–30)
CALCIUM SERPL-MCNC: 8.6 MG/DL (ref 8.5–10.1)
CHLORIDE BLD-SCNC: 110 MMOL/L (ref 94–109)
CO2 SERPL-SCNC: 19 MMOL/L (ref 20–32)
COLOR UR AUTO: ABNORMAL
CREAT SERPL-MCNC: 0.51 MG/DL (ref 0.52–1.04)
DIASTOLIC BLOOD PRESSURE - MUSE: NORMAL MMHG
EOSINOPHIL # BLD AUTO: 0.7 10E3/UL (ref 0–0.7)
EOSINOPHIL NFR BLD AUTO: 5 %
ERYTHROCYTE [DISTWIDTH] IN BLOOD BY AUTOMATED COUNT: 23.8 % (ref 10–15)
GFR SERPL CREATININE-BSD FRML MDRD: >90 ML/MIN/1.73M2
GLUCOSE BLD-MCNC: 87 MG/DL (ref 70–99)
GLUCOSE UR STRIP-MCNC: NEGATIVE MG/DL
HCG SERPL QL: NEGATIVE
HCT VFR BLD AUTO: 19.2 % (ref 35–47)
HGB BLD-MCNC: 7.2 G/DL (ref 11.7–15.7)
HGB UR QL STRIP: NEGATIVE
IMM GRANULOCYTES # BLD: 0.1 10E3/UL
IMM GRANULOCYTES NFR BLD: 1 %
INTERPRETATION ECG - MUSE: NORMAL
KETONES UR STRIP-MCNC: NEGATIVE MG/DL
LACTATE SERPL-SCNC: 1.4 MMOL/L (ref 0.7–2)
LEUKOCYTE ESTERASE UR QL STRIP: NEGATIVE
LVEF ECHO: NORMAL
LYMPHOCYTES # BLD AUTO: 3.4 10E3/UL (ref 0.8–5.3)
LYMPHOCYTES NFR BLD AUTO: 23 %
MCH RBC QN AUTO: 34 PG (ref 26.5–33)
MCHC RBC AUTO-ENTMCNC: 37.5 G/DL (ref 31.5–36.5)
MCV RBC AUTO: 91 FL (ref 78–100)
MONOCYTES # BLD AUTO: 1.1 10E3/UL (ref 0–1.3)
MONOCYTES NFR BLD AUTO: 7 %
NEUTROPHILS # BLD AUTO: 9.3 10E3/UL (ref 1.6–8.3)
NEUTROPHILS NFR BLD AUTO: 63 %
NITRATE UR QL: NEGATIVE
NRBC # BLD AUTO: 0.6 10E3/UL
NRBC BLD AUTO-RTO: 4 /100
P AXIS - MUSE: 65 DEGREES
PH UR STRIP: 6 [PH] (ref 5–7)
PLATELET # BLD AUTO: 304 10E3/UL (ref 150–450)
POTASSIUM BLD-SCNC: 3.7 MMOL/L (ref 3.4–5.3)
PR INTERVAL - MUSE: 152 MS
PROT SERPL-MCNC: 7.3 G/DL (ref 6.8–8.8)
QRS DURATION - MUSE: 72 MS
QT - MUSE: 366 MS
QTC - MUSE: 467 MS
R AXIS - MUSE: 43 DEGREES
RBC # BLD AUTO: 2.12 10E6/UL (ref 3.8–5.2)
RBC URINE: <1 /HPF
RETICS # AUTO: 0.57 10E6/UL (ref 0.03–0.1)
RETICS/RBC NFR AUTO: 27.1 % (ref 0.5–2)
SODIUM SERPL-SCNC: 139 MMOL/L (ref 133–144)
SP GR UR STRIP: 1 (ref 1–1.03)
SQUAMOUS EPITHELIAL: <1 /HPF
SYSTOLIC BLOOD PRESSURE - MUSE: NORMAL MMHG
T AXIS - MUSE: 35 DEGREES
UROBILINOGEN UR STRIP-MCNC: NORMAL MG/DL
VENTRICULAR RATE- MUSE: 98 BPM
WBC # BLD AUTO: 14.8 10E3/UL (ref 4–11)
WBC URINE: <1 /HPF

## 2021-08-29 PROCEDURE — 96361 HYDRATE IV INFUSION ADD-ON: CPT | Performed by: EMERGENCY MEDICINE

## 2021-08-29 PROCEDURE — 80053 COMPREHEN METABOLIC PANEL: CPT | Performed by: EMERGENCY MEDICINE

## 2021-08-29 PROCEDURE — 36415 COLL VENOUS BLD VENIPUNCTURE: CPT | Performed by: EMERGENCY MEDICINE

## 2021-08-29 PROCEDURE — 85025 COMPLETE CBC W/AUTO DIFF WBC: CPT | Performed by: EMERGENCY MEDICINE

## 2021-08-29 PROCEDURE — 93321 DOPPLER ECHO F-UP/LMTD STD: CPT

## 2021-08-29 PROCEDURE — 93308 TTE F-UP OR LMTD: CPT | Mod: 26 | Performed by: STUDENT IN AN ORGANIZED HEALTH CARE EDUCATION/TRAINING PROGRAM

## 2021-08-29 PROCEDURE — 96376 TX/PRO/DX INJ SAME DRUG ADON: CPT | Performed by: EMERGENCY MEDICINE

## 2021-08-29 PROCEDURE — 84703 CHORIONIC GONADOTROPIN ASSAY: CPT | Performed by: EMERGENCY MEDICINE

## 2021-08-29 PROCEDURE — 71046 X-RAY EXAM CHEST 2 VIEWS: CPT | Mod: 26 | Performed by: RADIOLOGY

## 2021-08-29 PROCEDURE — 93325 DOPPLER ECHO COLOR FLOW MAPG: CPT | Mod: 26 | Performed by: STUDENT IN AN ORGANIZED HEALTH CARE EDUCATION/TRAINING PROGRAM

## 2021-08-29 PROCEDURE — 99285 EMERGENCY DEPT VISIT HI MDM: CPT | Mod: 25 | Performed by: EMERGENCY MEDICINE

## 2021-08-29 PROCEDURE — 93005 ELECTROCARDIOGRAM TRACING: CPT | Performed by: EMERGENCY MEDICINE

## 2021-08-29 PROCEDURE — 93010 ELECTROCARDIOGRAM REPORT: CPT | Performed by: EMERGENCY MEDICINE

## 2021-08-29 PROCEDURE — 96374 THER/PROPH/DIAG INJ IV PUSH: CPT | Performed by: EMERGENCY MEDICINE

## 2021-08-29 PROCEDURE — 71046 X-RAY EXAM CHEST 2 VIEWS: CPT

## 2021-08-29 PROCEDURE — 83605 ASSAY OF LACTIC ACID: CPT | Performed by: EMERGENCY MEDICINE

## 2021-08-29 PROCEDURE — 93321 DOPPLER ECHO F-UP/LMTD STD: CPT | Mod: 26 | Performed by: STUDENT IN AN ORGANIZED HEALTH CARE EDUCATION/TRAINING PROGRAM

## 2021-08-29 PROCEDURE — 250N000011 HC RX IP 250 OP 636: Performed by: EMERGENCY MEDICINE

## 2021-08-29 PROCEDURE — 81001 URINALYSIS AUTO W/SCOPE: CPT | Performed by: EMERGENCY MEDICINE

## 2021-08-29 PROCEDURE — 258N000003 HC RX IP 258 OP 636: Performed by: EMERGENCY MEDICINE

## 2021-08-29 PROCEDURE — 85045 AUTOMATED RETICULOCYTE COUNT: CPT | Performed by: EMERGENCY MEDICINE

## 2021-08-29 RX ORDER — HEPARIN SODIUM (PORCINE) LOCK FLUSH IV SOLN 100 UNIT/ML 100 UNIT/ML
5-10 SOLUTION INTRAVENOUS
Status: DISCONTINUED | OUTPATIENT
Start: 2021-08-29 | End: 2021-08-29 | Stop reason: HOSPADM

## 2021-08-29 RX ORDER — MORPHINE SULFATE 2 MG/ML
2 INJECTION, SOLUTION INTRAMUSCULAR; INTRAVENOUS
Status: COMPLETED | OUTPATIENT
Start: 2021-08-29 | End: 2021-08-29

## 2021-08-29 RX ADMIN — MORPHINE SULFATE 2 MG: 2 INJECTION, SOLUTION INTRAMUSCULAR; INTRAVENOUS at 02:58

## 2021-08-29 RX ADMIN — SODIUM CHLORIDE, POTASSIUM CHLORIDE, SODIUM LACTATE AND CALCIUM CHLORIDE 1000 ML: 600; 310; 30; 20 INJECTION, SOLUTION INTRAVENOUS at 01:58

## 2021-08-29 RX ADMIN — MORPHINE SULFATE 2 MG: 2 INJECTION, SOLUTION INTRAMUSCULAR; INTRAVENOUS at 01:58

## 2021-08-29 RX ADMIN — MORPHINE SULFATE 2 MG: 2 INJECTION, SOLUTION INTRAMUSCULAR; INTRAVENOUS at 04:32

## 2021-08-29 RX ADMIN — Medication 5 ML: at 11:40

## 2021-08-29 ASSESSMENT — MIFFLIN-ST. JEOR: SCORE: 1516.11

## 2021-08-29 NOTE — DISCHARGE INSTRUCTIONS
Follow up with your Hematologist this week. Return with concerns.   Continue taking your current medications  The echocardiogram test was normal

## 2021-08-29 NOTE — ED PROVIDER NOTES
ED Provider Note  [facility]      History     Chief Complaint   Patient presents with     Sickle Cell Pain Crisis     HPI  Jennifer Cervantes is a 21 year old female PMH significant for sickle cell disease, superficial venous thrombosis of right arm, cerebral infarction, acute respiratory failure with hypoxia, multiple subsegmental pulmonary emboli without acute cor pulmonale, ODD and panic disorder  who presents to the ED with complaint of pain.  She states that yesterday morning she started having pain in her bilateral arms and legs, low back, which she felt was compatible with her previous episodes of sickle cell pain.  She states she also started having pain in the left mid back which was much more severe, sharp, different from previous episodes of sickle cell crisis.  She reports that that pain was is worse with deep breathing, yawning, specific movements.  No shortness of breath, cough, fever, abdominal pain, vomiting.  No acute numbness or weakness.  She states that her doctor recently changed her at home pain regiment for when she is having crises, and that generally has been working well for her.  However, today she follow through with that plan and the pain continues to be unmanageable in the left mid back.  She does report that she has a history of previous PE, thinks that perhaps this is similar to that previous episode of PE.  She states that she has been using her blood thinner every day as directed.  No trauma.  No dysuria.    Past Medical History  Past Medical History:   Diagnosis Date     Anxiety      Bleeding disorder (H)      Blood clotting disorder (H)      Cerebral infarction (H) 2015     Cognitive developmental delay     low IQ. 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 (H)     right hand contractures     Iron overload due to repeated red  blood cell transfusions      Migraines      Multiple subsegmental pulmonary emboli without acute cor pulmonale (H) 02/01/2021     Oppositional defiant behavior      Superficial venous thrombosis of arm, right 03/25/2021     Uncomplicated asthma      Past Surgical History:   Procedure Laterality Date     AS INSERT TUNNELED CV 2 CATH W/O PORT/PUMP       C BREAST REDUCTION (INCLUDES LIPO) TIER 3 Bilateral 04/23/2019     CHOLECYSTECTOMY       INSERT PORT VASCULAR ACCESS Left 4/21/2021    Procedure: INSERTION, VASCULAR ACCESS PORT (NOT SURE ON SIDE UNTIL REMOVAL);  Surgeon: Rajan More MD;  Location: [facility] OR     IR CHEST PORT PLACEMENT > 5 YRS OF AGE  4/21/2021     IR CVC NON TUNNEL LINE REMOVAL  5/6/2021     IR CVC NON TUNNEL PLACEMENT  04/07/2020     IR CVC NON TUNNEL PLACEMENT  4/30/2021     IR PORT REMOVAL LEFT  4/21/2021     REMOVE PORT VASCULAR ACCESS Left 4/21/2021    Procedure: REMOVAL, VASCULAR ACCESS PORT LEFT;  Surgeon: Rajan More MD;  Location: [facility] OR     REPAIR TENDON ELBOW Right 10/02/2019    Procedure: Right Elbow Flexor Lengthening, Flexor Pronator Slide Of Wrist and Finger, Thumb Adductor Lengthening;  Surgeon: Anai Franco MD;  Location: [facility] OR     TONSILLECTOMY Bilateral 10/02/2019    Procedure: Bilateral Tonsillectomy;  Surgeon: Farhana Guy MD;  Location: [facility] OR     acetaminophen (TYLENOL) 325 MG tablet  albuterol (PROAIR HFA/PROVENTIL HFA/VENTOLIN HFA) 108 (90 Base) MCG/ACT inhaler  albuterol (PROVENTIL) (2.5 MG/3ML) 0.083% neb solution  ARIPiprazole (ABILIFY) 2 MG tablet  aspirin (ASA) 81 MG chewable tablet  budesonide-formoterol (SYMBICORT) 160-4.5 MCG/ACT Inhaler  dabigatran ANTICOAGULANT (PRADAXA) 150 MG capsule  diclofenac (VOLTAREN) 1 % topical gel  diphenhydrAMINE (BENADRYL) 25 MG capsule  DULoxetine (CYMBALTA) 30 MG capsule  EPINEPHrine (ANY BX GENERIC EQUIV) 0.3 MG/0.3ML injection 2-pack  Hydroxyurea 1000 MG TABS  hydrOXYzine (ATARAX) 25 MG tablet  JADENU 360  "MG tablet  lidocaine-prilocaine (EMLA) 2.5-2.5 % external cream  medroxyPROGESTERone (DEPO-PROVERA) 150 MG/ML IM injection  naloxone (NARCAN) 4 MG/0.1ML nasal spray  omeprazole (PRILOSEC) 20 MG DR capsule  ondansetron (ZOFRAN) 8 MG tablet  oxyCODONE (OXYCONTIN) 10 MG 12 hr tablet  oxyCODONE IR (ROXICODONE) 15 MG tablet      Allergies   Allergen Reactions     Contrast Dye      Hives and breathing issues     Fish-Derived Products Hives     Seafood Hives     Diagnostic X-Ray Materials      Gadolinium      Family History  Family History   Problem Relation Age of Onset     Sickle Cell Trait Mother      Hypertension Mother      Asthma Mother      Sickle Cell Trait Father      Social History   Social History     Tobacco Use     Smoking status: Never Smoker     Smokeless tobacco: Never Used   Substance Use Topics     Alcohol use: Not Currently     Alcohol/week: 0.0 standard drinks     Drug use: Never      Past medical history, past surgical history, medications, allergies, family history, and social history were reviewed with the patient. No additional pertinent items.       Review of Systems  A complete review of systems was performed with pertinent positives and negatives noted in the HPI, and all other systems negative.    Physical Exam   BP: (!) 146/96  Pulse: 111  Temp: 98.3  F (36.8  C)  Resp: 16  Height: 162.6 cm (5' 4\")  Weight: 77.1 kg (170 lb)  SpO2: 92 %  Physical Exam  Constitutional:       General: She is not in acute distress.     Appearance: She is not diaphoretic.   HENT:      Head: Atraumatic.   Eyes:      General: No scleral icterus.  Cardiovascular:      Heart sounds: Normal heart sounds.   Pulmonary:      Effort: No respiratory distress.      Breath sounds: Normal breath sounds.   Abdominal:      General: Bowel sounds are normal.      Palpations: Abdomen is soft.      Tenderness: There is no abdominal tenderness.   Musculoskeletal:         General: No tenderness.        Back:       Comments: Lower " extremity strength is equal bilateral-the patient does have chronic foot drop on the right.  Sensation nl/equal.   Skin:     General: Skin is warm.      Findings: No rash.         ED Course      Procedures            EKG Interpretation:      Interpreted by Court Ring MD  Time reviewed: 0648  Symptoms at time of EKG: none   Rhythm: normal sinus   Rate: normal  Axis: normal  Ectopy: none  Conduction: normal  ST Segments/ T Waves: No ST-T wave changes  Q Waves: none  Comparison to prior: Unchanged    Clinical Impression: normal EKG             Results for orders placed or performed during the hospital encounter of 08/29/21   Chest XR,  PA & LAT     Status: None    Narrative    EXAM: CHEST 2 VIEWS  LOCATION: [facility]  DATE/TIME: 08/29/2021, 3:48 AM    INDICATION: Mid left back pain.  COMPARISON: None.    FINDINGS: The lungs are clear. Normal-sized cardiac silhouette. Left anterior chest wall port with catheter entering the left internal jugular vein and distal tip in the superior vena cava near its junction with the right atrium. Mild elevation of the   right hemidiaphragm. Surgical clips in the upper right hemiabdomen likely relate to prior cholecystectomy.      Impression    IMPRESSION: No evidence of active cardiopulmonary disease.      CBC with platelets differential     Status: Abnormal    Narrative    The following orders were created for panel order CBC with platelets differential.  Procedure                               Abnormality         Status                     ---------                               -----------         ------                     CBC with platelets and d...[[id number]]  Abnormal            Final result                 Please view results for these tests on the individual orders.   Comprehensive metabolic panel     Status: Abnormal   Result Value Ref Range    Sodium 139 133 - 144 mmol/L    Potassium 3.7 3.4 - 5.3 mmol/L    Chloride  110 (H) 94 - 109 mmol/L    Carbon Dioxide (CO2) 19 (L) 20 - 32 mmol/L    Anion Gap 10 3 - 14 mmol/L    Urea Nitrogen 8 7 - 30 mg/dL    Creatinine 0.51 (L) 0.52 - 1.04 mg/dL    Calcium 8.6 8.5 - 10.1 mg/dL    Glucose 87 70 - 99 mg/dL    Alkaline Phosphatase 72 40 - 150 U/L    AST 74 (H) 0 - 45 U/L    ALT 61 (H) 0 - 50 U/L    Protein Total 7.3 6.8 - 8.8 g/dL    Albumin 3.6 3.4 - 5.0 g/dL    Bilirubin Total 3.1 (H) 0.2 - 1.3 mg/dL    GFR Estimate >90 >60 mL/min/1.73m2   Reticulocyte count     Status: Abnormal   Result Value Ref Range    % Reticulocyte 27.1 (H) 0.5 - 2.0 %    Absolute Reticulocyte 0.574 (H) 0.025 - 0.095 10e6/uL   UA with Microscopic     Status: Abnormal   Result Value Ref Range    Color Urine Light Yellow Colorless, Straw, Light Yellow, Yellow    Appearance Urine Clear Clear    Glucose Urine Negative Negative mg/dL    Bilirubin Urine Negative Negative    Ketones Urine Negative Negative mg/dL    Specific Gravity Urine 1.005 1.003 - 1.035    Blood Urine Negative Negative    pH Urine 6.0 5.0 - 7.0    Protein Albumin Urine Negative Negative mg/dL    Urobilinogen Urine Normal Normal, 2.0 mg/dL    Nitrite Urine Negative Negative    Leukocyte Esterase Urine Negative Negative    Bacteria Urine Few (A) None Seen /HPF    RBC Urine <1 <=2 /HPF    WBC Urine <1 <=5 /HPF    Squamous Epithelials Urine <1 <=1 /HPF   HCG qualitative pregnancy (blood)     Status: Normal   Result Value Ref Range    hCG Serum Qualitative Negative Negative   CBC with platelets and differential     Status: Abnormal   Result Value Ref Range    WBC Count 14.8 (H) 4.0 - 11.0 10e3/uL    RBC Count 2.12 (L) 3.80 - 5.20 10e6/uL    Hemoglobin 7.2 (L) 11.7 - 15.7 g/dL    Hematocrit 19.2 (L) 35.0 - 47.0 %    MCV 91 78 - 100 fL    MCH 34.0 (H) 26.5 - 33.0 pg    MCHC 37.5 (H) 31.5 - 36.5 g/dL    RDW 23.8 (H) 10.0 - 15.0 %    Platelet Count 304 150 - 450 10e3/uL    % Neutrophils 63 %    % Lymphocytes 23 %    % Monocytes 7 %    % Eosinophils 5 %    %  Basophils 1 %    % Immature Granulocytes 1 %    NRBCs per 100 WBC 4 (H) <1 /100    Absolute Neutrophils 9.3 (H) 1.6 - 8.3 10e3/uL    Absolute Lymphocytes 3.4 0.8 - 5.3 10e3/uL    Absolute Monocytes 1.1 0.0 - 1.3 10e3/uL    Absolute Eosinophils 0.7 0.0 - 0.7 10e3/uL    Absolute Basophils 0.2 0.0 - 0.2 10e3/uL    Absolute Immature Granulocytes 0.1 (H) <=0.0 10e3/uL    Absolute NRBCs 0.6 10e3/uL   Lactic acid whole blood     Status: Normal   Result Value Ref Range    Lactic Acid 1.4 0.7 - 2.0 mmol/L   EKG 12 lead     Status: None (Preliminary result)   Result Value Ref Range    Systolic Blood Pressure  mmHg    Diastolic Blood Pressure  mmHg    Ventricular Rate 98 BPM    Atrial Rate 98 BPM    SD Interval 152 ms    QRS Duration 72 ms     ms    QTc 467 ms    P Axis 65 degrees    R AXIS 43 degrees    T Axis 35 degrees    Interpretation ECG Sinus rhythm  Normal ECG        Medications   morphine (PF) injection 2 mg (2 mg Intravenous Given 8/29/21 0432)   lactated ringers BOLUS 1,000 mL (0 mLs Intravenous Stopped 8/29/21 0432)        Assessments & Plan (with Medical Decision Making)   The patient presents with her typical sickle cell pain, as well as some atypical pain in the left mid back which she states is pleuritic.  UA was done which shows no sign of UTI.  Chest x-ray was done which was not remarkable.  EKG was unremarkable.  Pain has improved with her normal pain regiment.  White blood cell count is mildly elevated at 14.8, but this is not outside the realm of recent WBCs.  Hemoglobin is a little bit lower than recent today at 7.2, but not significantly outside of her recent range.  Reticulocyte count is elevated, which is expected.  LFTs are mildly elevated, T bili is mildly elevated.  She cannot have a chest CT as she does have an allergy to IV contrast.  I did speak with the radiologist who would be reading a perfusion study, and he does not recommend this, feels that it will pose unnecessary stress on the  patient's body and will be unlikely to give any significant new information given that the most recent study was less than a month ago.  He is recommending an echo instead to assess for any sign of new acute right heart strain.  I did speak with the cardiology fellow who felt that we be able to get 1 within the next hour.  I did make the patient aware that there was a little bit of a delay on this, she is comfortable with waiting.  If this is negative I do think she can safely be discharged.  She will be signed out to the oncoming provider at shift change pending this result.    Dictation Disclaimer: Some of this Note has been completed with voice-recognition dictation software. Although errors are generally corrected real-time, there is the potential for a rare error to be present in the completed chart.      I have reviewed the nursing notes. I have reviewed the findings, diagnosis, plan and need for follow up with the patient.    New Prescriptions    No medications on file       Final diagnoses:   Sickle cell pain crisis (H)       --  Court Rign  [facility] EMERGENCY DEPARTMENT  8/29/2021     Court Ring MD  08/29/21 0778

## 2021-08-29 NOTE — TELEPHONE ENCOUNTER
"Sickle cell anemi and usually has \"pain all over,\" but tonight has pain on left side of chest and around upper back, sharp and rating 9/10.  Is having some shortness of breath, and has tried all previous care plans, medications.  Patient able to take deep breath, but hard to do because of the pain as it gets worse when she takes a deep breath. Patient has history of blood clots.    Patient should be seen in the ER per guidelines, and patient agrees with plan.    Ivy Jennings RN  [facility]      Reason for Disposition    SEVERE chest pain    Additional Information    Negative: Passed out (i.e., lost consciousness, collapsed and was not responding)    Negative: Shock suspected (e.g., cold/pale/clammy skin, too weak to stand, low BP, rapid pulse)    Negative: Sounds like a life-threatening emergency to the triager    Negative: [1] SEVERE back pain (e.g., excruciating) AND [2] sudden onset AND [3] age > 60    Negative: [1] Unable to urinate (or only a few drops) > 4 hours AND [2] bladder feels very full (e.g., palpable bladder or strong urge to urinate)    Negative: [1] Loss of bladder or bowel control (urine or bowel incontinence; wetting self, leaking stool) AND [2] new onset    Negative: Numbness in groin or rectal area (i.e., loss of sensation)    Negative: [1] SEVERE abdominal pain AND [2] present > 1 hour    Negative: [1] Abdominal pain AND [2] age > 60    Negative: Weakness of a leg or foot (e.g., unable to bear weight, dragging foot)    Negative: Unable to walk    Negative: Patient sounds very sick or weak to the triager    Negative: Fainted    Negative: Severe difficulty breathing (e.g., struggling for each breath, speaks in single words)    Negative: Difficult to awaken or acting confused (e.g., disoriented, slurred speech)    Negative: Shock suspected (e.g., cold/pale/clammy skin, too weak to stand, low BP, rapid pulse)    Negative: [1] Chest pain lasts > 5 minutes AND [2] history of heart " disease  (i.e., heart attack, bypass surgery, angina, angioplasty, CHF; not just a heart murmur)    Negative: [1] Chest pain lasts > 5 minutes AND [2] described as crushing, pressure-like, or heavy    Negative: [1] Chest pain lasts > 5 minutes AND [2] age > 50    Negative: [1] Chest pain lasts > 5 minutes AND [2] age > 30 AND [3] at least one cardiac risk factor (i.e., hypertension, diabetes, obesity, smoker or strong family history of heart disease)    Negative: [1] Chest pain lasts > 5 minutes AND [2] not relieved with nitroglycerin    Negative: Passed out (i.e., lost consciousness, collapsed and was not responding)    Negative: Heart beating < 50 beats per minute OR > 140 beats per minute    Negative: Visible sweat on face or sweat dripping down face    Negative: Sounds like a life-threatening emergency to the triager    Protocols used: CHEST PAIN-A-AH, BACK PAIN-A-AH, CHEST PAIN-P-AH

## 2021-08-29 NOTE — ED TRIAGE NOTES
Patient arrives with sickle cell pain and pain left side of body. Wants to make sure its not a blood clot. Sickle cell pain in mostly in her legs.

## 2021-08-30 ENCOUNTER — HOME INFUSION (PRE-WILLOW HOME INFUSION) (OUTPATIENT)
Dept: PHARMACY | Facility: CLINIC | Age: 21
End: 2021-08-30

## 2021-08-30 DIAGNOSIS — D57.00 SICKLE CELL PAIN CRISIS (H): ICD-10-CM

## 2021-08-30 RX ORDER — OXYCODONE HYDROCHLORIDE 15 MG/1
TABLET ORAL
Qty: 60 TABLET | Refills: 0 | Status: SHIPPED | OUTPATIENT
Start: 2021-08-30 | End: 2021-09-09

## 2021-08-30 NOTE — TELEPHONE ENCOUNTER
Refill Request    Date of most recent appointment:  8/24/21  Next upcoming appointment:   9/20/21    Medication requested:  Oxycodone  Quantity:  60  Last fill date:  8/18/21  Person requesting refill:  Jennifer  Notes:  Almost out of medication  Pt usually takes every 6 hours, but has had to take every four hours because of pain level. Pain is in lower back and bilateral legs/thighs.   Pain rating 9/10  Took pill last at 6 a.m.   Pt is almost out of medication.     Prescribing provider(s):  Jeannette    Pended and Routed to Dr. Duncan to sign

## 2021-09-01 ENCOUNTER — TELEPHONE (OUTPATIENT)
Dept: ONCOLOGY | Facility: CLINIC | Age: 21
End: 2021-09-01

## 2021-09-01 NOTE — TELEPHONE ENCOUNTER
Pt called in to triage to report sharp pain in LEFT side, when stretches, sharp pain worsens to sharp stabbing pain rated 9/10 x 2 days. Pt went to ED in the middle of the night, got IV meds. Stated last took prn warm bath, heat pad, warm blankets and all medications and all regular meds including tylenol. Pt has been following the pain management regimen that she and provider have agreed on, but this time, pain is still present and there is no relief. Denied any fevers, chills, cough, sob, chest pain or other symptoms.     Pt reports that this is typical sickle cell pain, but isn't manageable with meds this time.  Pain is located in upper LEFT side by her breast and into her side and back.     Pt's last infusion was 8/29 in the hospital, last clinic visit 8/24/21 with follow-up on 9/20/21 with Dr. Duncan.     Discussed with pt that we are unable to offer her an apt in clinic due to time now being 4:30. Pt really does not want to go to the hospital ED but understands that it is her only option at this time. This RN advises pt go to the ED now, but pt does want to wait until later so she doesn't have to wait in the ED so long. Informed pt that my advice is still to go to the ED now, but if she doesn't and her sx worsen or she experiences fevers, chills, cough, sob, chest pain or other symptoms, she needs to go asap.    Pt verbalized understanding of these instructions and is in agreement with being evaluated in the ED today.     Message routed to Dr. Duncan and Care Team.

## 2021-09-02 ENCOUNTER — HOSPITAL ENCOUNTER (EMERGENCY)
Facility: CLINIC | Age: 21
Discharge: HOME | End: 2021-09-02
Attending: EMERGENCY MEDICINE | Admitting: EMERGENCY MEDICINE
Payer: COMMERCIAL

## 2021-09-02 ENCOUNTER — APPOINTMENT (OUTPATIENT)
Dept: GENERAL RADIOLOGY | Facility: CLINIC | Age: 21
End: 2021-09-02
Attending: EMERGENCY MEDICINE
Payer: COMMERCIAL

## 2021-09-02 VITALS
WEIGHT: 170 LBS | TEMPERATURE: 98.6 F | SYSTOLIC BLOOD PRESSURE: 105 MMHG | RESPIRATION RATE: 16 BRPM | HEIGHT: 64 IN | OXYGEN SATURATION: 92 % | BODY MASS INDEX: 29.02 KG/M2 | HEART RATE: 108 BPM | DIASTOLIC BLOOD PRESSURE: 56 MMHG

## 2021-09-02 DIAGNOSIS — D57.00 SICKLE CELL PAIN CRISIS (H): ICD-10-CM

## 2021-09-02 LAB
ALBUMIN SERPL-MCNC: 3.9 G/DL (ref 3.4–5)
ALP SERPL-CCNC: 77 U/L (ref 40–150)
ALT SERPL W P-5'-P-CCNC: 63 U/L (ref 0–50)
ANION GAP SERPL CALCULATED.3IONS-SCNC: 11 MMOL/L (ref 3–14)
AST SERPL W P-5'-P-CCNC: ABNORMAL U/L
BASOPHILS # BLD MANUAL: 0.8 10E3/UL (ref 0–0.2)
BASOPHILS NFR BLD MANUAL: 6 %
BILIRUB SERPL-MCNC: 3.2 MG/DL (ref 0.2–1.3)
BUN SERPL-MCNC: 8 MG/DL (ref 7–30)
CALCIUM SERPL-MCNC: 8.4 MG/DL (ref 8.5–10.1)
CHLORIDE BLD-SCNC: 110 MMOL/L (ref 94–109)
CO2 SERPL-SCNC: 17 MMOL/L (ref 20–32)
CREAT SERPL-MCNC: 0.55 MG/DL (ref 0.52–1.04)
ELLIPTOCYTES BLD QL SMEAR: ABNORMAL
EOSINOPHIL # BLD MANUAL: 0.5 10E3/UL (ref 0–0.7)
EOSINOPHIL NFR BLD MANUAL: 4 %
ERYTHROCYTE [DISTWIDTH] IN BLOOD BY AUTOMATED COUNT: 25.1 % (ref 10–15)
FRAGMENTS BLD QL SMEAR: SLIGHT
GFR SERPL CREATININE-BSD FRML MDRD: >90 ML/MIN/1.73M2
GLUCOSE BLD-MCNC: 94 MG/DL (ref 70–99)
HCT VFR BLD AUTO: 22 % (ref 35–47)
HGB BLD-MCNC: 7.8 G/DL (ref 11.7–15.7)
HOLD SPECIMEN: NORMAL
LYMPHOCYTES # BLD MANUAL: 4.8 10E3/UL (ref 0.8–5.3)
LYMPHOCYTES NFR BLD MANUAL: 35 %
MCH RBC QN AUTO: 33.3 PG (ref 26.5–33)
MCHC RBC AUTO-ENTMCNC: 35.5 G/DL (ref 31.5–36.5)
MCV RBC AUTO: 94 FL (ref 78–100)
MONOCYTES # BLD MANUAL: 0.8 10E3/UL (ref 0–1.3)
MONOCYTES NFR BLD MANUAL: 6 %
NEUTROPHILS # BLD MANUAL: 6.7 10E3/UL (ref 1.6–8.3)
NEUTROPHILS NFR BLD MANUAL: 49 %
NRBC # BLD AUTO: 3.4 10E3/UL
NRBC BLD MANUAL-RTO: 25 %
PLAT MORPH BLD: ABNORMAL
PLATELET # BLD AUTO: 304 10E3/UL (ref 150–450)
POLYCHROMASIA BLD QL SMEAR: SLIGHT
POTASSIUM BLD-SCNC: 3.7 MMOL/L (ref 3.4–5.3)
PROT SERPL-MCNC: 7.9 G/DL (ref 6.8–8.8)
RBC # BLD AUTO: 2.34 10E6/UL (ref 3.8–5.2)
RBC MORPH BLD: ABNORMAL
RETICS # AUTO: 0.63 10E6/UL (ref 0.03–0.1)
RETICS/RBC NFR AUTO: 26.9 % (ref 0.5–2)
SICKLE CELLS BLD QL SMEAR: ABNORMAL
SODIUM SERPL-SCNC: 138 MMOL/L (ref 133–144)
TARGETS BLD QL SMEAR: SLIGHT
WBC # BLD AUTO: 13.6 10E3/UL (ref 4–11)

## 2021-09-02 PROCEDURE — 71046 X-RAY EXAM CHEST 2 VIEWS: CPT

## 2021-09-02 PROCEDURE — 99285 EMERGENCY DEPT VISIT HI MDM: CPT | Mod: 25 | Performed by: EMERGENCY MEDICINE

## 2021-09-02 PROCEDURE — 258N000003 HC RX IP 258 OP 636: Performed by: EMERGENCY MEDICINE

## 2021-09-02 PROCEDURE — 250N000011 HC RX IP 250 OP 636: Performed by: EMERGENCY MEDICINE

## 2021-09-02 PROCEDURE — 96374 THER/PROPH/DIAG INJ IV PUSH: CPT | Performed by: EMERGENCY MEDICINE

## 2021-09-02 PROCEDURE — 96376 TX/PRO/DX INJ SAME DRUG ADON: CPT | Performed by: EMERGENCY MEDICINE

## 2021-09-02 PROCEDURE — 84155 ASSAY OF PROTEIN SERUM: CPT | Performed by: EMERGENCY MEDICINE

## 2021-09-02 PROCEDURE — 96361 HYDRATE IV INFUSION ADD-ON: CPT | Performed by: EMERGENCY MEDICINE

## 2021-09-02 PROCEDURE — 99285 EMERGENCY DEPT VISIT HI MDM: CPT | Performed by: EMERGENCY MEDICINE

## 2021-09-02 PROCEDURE — 84075 ASSAY ALKALINE PHOSPHATASE: CPT | Performed by: EMERGENCY MEDICINE

## 2021-09-02 PROCEDURE — 71046 X-RAY EXAM CHEST 2 VIEWS: CPT | Mod: 26 | Performed by: RADIOLOGY

## 2021-09-02 PROCEDURE — 85027 COMPLETE CBC AUTOMATED: CPT | Performed by: EMERGENCY MEDICINE

## 2021-09-02 PROCEDURE — 85045 AUTOMATED RETICULOCYTE COUNT: CPT | Performed by: EMERGENCY MEDICINE

## 2021-09-02 PROCEDURE — 36415 COLL VENOUS BLD VENIPUNCTURE: CPT | Performed by: EMERGENCY MEDICINE

## 2021-09-02 RX ORDER — MORPHINE SULFATE 2 MG/ML
2 INJECTION, SOLUTION INTRAMUSCULAR; INTRAVENOUS
Status: COMPLETED | OUTPATIENT
Start: 2021-09-02 | End: 2021-09-02

## 2021-09-02 RX ORDER — HEPARIN SODIUM (PORCINE) LOCK FLUSH IV SOLN 100 UNIT/ML 100 UNIT/ML
5-10 SOLUTION INTRAVENOUS
Status: DISCONTINUED | OUTPATIENT
Start: 2021-09-02 | End: 2021-09-02 | Stop reason: HOSPADM

## 2021-09-02 RX ADMIN — MORPHINE SULFATE 2 MG: 2 INJECTION, SOLUTION INTRAMUSCULAR; INTRAVENOUS at 03:39

## 2021-09-02 RX ADMIN — SODIUM CHLORIDE, POTASSIUM CHLORIDE, SODIUM LACTATE AND CALCIUM CHLORIDE 1000 ML: 600; 310; 30; 20 INJECTION, SOLUTION INTRAVENOUS at 01:21

## 2021-09-02 RX ADMIN — MORPHINE SULFATE 2 MG: 2 INJECTION, SOLUTION INTRAMUSCULAR; INTRAVENOUS at 02:34

## 2021-09-02 RX ADMIN — Medication 5 ML: at 04:57

## 2021-09-02 RX ADMIN — MORPHINE SULFATE 2 MG: 2 INJECTION, SOLUTION INTRAMUSCULAR; INTRAVENOUS at 01:34

## 2021-09-02 ASSESSMENT — ENCOUNTER SYMPTOMS
COLOR CHANGE: 0
SHORTNESS OF BREATH: 0
CONFUSION: 0
DIFFICULTY URINATING: 0
NECK STIFFNESS: 0
FEVER: 0
BRUISES/BLEEDS EASILY: 1
EYE REDNESS: 0
ABDOMINAL PAIN: 0
ARTHRALGIAS: 0
HEADACHES: 0
BACK PAIN: 1

## 2021-09-02 ASSESSMENT — MIFFLIN-ST. JEOR: SCORE: 1516.11

## 2021-09-02 NOTE — ED PROVIDER NOTES
ED Provider Note  [facility]      History     Chief Complaint   Patient presents with     Sickle Cell Pain Crisis     The history is provided by the patient and medical records.     Jennifer Cervantes is a 21 year old female with a PMH notable for sickle cell disease and crisis, superficial venous thrombosis of right arm, cerebral infarction, acute respiratory failure with hypoxia, multiple subsegmental pulmonary emboli without acute cor pulmonale, oppositional defiant behavior and panic disorder. Patient presents to the ED for an evaluation of a sickle cell pain crisis. She states 24 hours ago she started to have back pain, bilateral leg pain, and left sided flank pain. She states with any sudden movements the pain worsens. She reports taking her medications, used heat pads and warm blankets, and warm baths but the pain has not subsided. She states she took oxycodone and tylenol for the pain. Denies shortness of breath or cough. She states she had a sharp chest pain a coupe of hours ago, but nothing since. Denies fever. No nausea or vomiting. No urinary issues. No abdominal pain.     Per chart review patient was seen in the ED on 8/29/21 for a sickle cell crisis. She presented with her typical pain as well as some atypical pleuritic left mid back.  UA was done which shows no sign of UTI.  Chest x-ray was done which was not remarkable.  EKG was unremarkable.  Pain has improved with her normal pain regiment.  White blood cell count is mildly elevated at 14.8. LFTs are mildly elevated, T bili is mildly elevated. Patient was discharged with instructions to f/u with hematologist and to return with any concerns.     Chest XR,  PA & LAT 8/29/21  IMPRESSION: No evidence of active cardiopulmonary disease.     Echo Limited 8/29/21  Interpretation Summary  Global and regional left ventricular function is normal with an EF of 55-60%.  Global right ventricular function is normal. The right ventricle is  normal  size.  No significant valvular abnormalities.  The estimated PA systolic pressure is 20 mmHg.      Past Medical History  Past Medical History:   Diagnosis Date     Anxiety      Bleeding disorder (H)      Blood clotting disorder (H)      Cerebral infarction (H) 2015     Cognitive developmental delay     low IQ. 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 (H)     right hand contractures     Iron overload due to repeated red blood cell transfusions      Migraines      Multiple subsegmental pulmonary emboli without acute cor pulmonale (H) 02/01/2021     Oppositional defiant behavior      Superficial venous thrombosis of arm, right 03/25/2021     Uncomplicated asthma      Past Surgical History:   Procedure Laterality Date     AS INSERT TUNNELED CV 2 CATH W/O PORT/PUMP       C BREAST REDUCTION (INCLUDES LIPO) TIER 3 Bilateral 04/23/2019     CHOLECYSTECTOMY       INSERT PORT VASCULAR ACCESS Left 4/21/2021    Procedure: INSERTION, VASCULAR ACCESS PORT (NOT SURE ON SIDE UNTIL REMOVAL);  Surgeon: Rajan More MD;  Location: [facility] OR     IR CHEST PORT PLACEMENT > 5 YRS OF AGE  4/21/2021     IR CVC NON TUNNEL LINE REMOVAL  5/6/2021     IR CVC NON TUNNEL PLACEMENT  04/07/2020     IR CVC NON TUNNEL PLACEMENT  4/30/2021     IR PORT REMOVAL LEFT  4/21/2021     REMOVE PORT VASCULAR ACCESS Left 4/21/2021    Procedure: REMOVAL, VASCULAR ACCESS PORT LEFT;  Surgeon: Rajan More MD;  Location: [facility] OR     REPAIR TENDON ELBOW Right 10/02/2019    Procedure: Right Elbow Flexor Lengthening, Flexor Pronator Slide Of Wrist and Finger, Thumb Adductor Lengthening;  Surgeon: Anai Franco MD;  Location: [facility] OR     TONSILLECTOMY Bilateral 10/02/2019    Procedure: Bilateral Tonsillectomy;  Surgeon: Farhana Guy MD;  Location: [facility] OR     acetaminophen (TYLENOL) 325 MG tablet  albuterol  (PROAIR HFA/PROVENTIL HFA/VENTOLIN HFA) 108 (90 Base) MCG/ACT inhaler  albuterol (PROVENTIL) (2.5 MG/3ML) 0.083% neb solution  ARIPiprazole (ABILIFY) 2 MG tablet  aspirin (ASA) 81 MG chewable tablet  budesonide-formoterol (SYMBICORT) 160-4.5 MCG/ACT Inhaler  dabigatran ANTICOAGULANT (PRADAXA) 150 MG capsule  diclofenac (VOLTAREN) 1 % topical gel  diphenhydrAMINE (BENADRYL) 25 MG capsule  DULoxetine (CYMBALTA) 30 MG capsule  EPINEPHrine (ANY BX GENERIC EQUIV) 0.3 MG/0.3ML injection 2-pack  Hydroxyurea 1000 MG TABS  hydrOXYzine (ATARAX) 25 MG tablet  JADENU 360 MG tablet  lidocaine-prilocaine (EMLA) 2.5-2.5 % external cream  medroxyPROGESTERone (DEPO-PROVERA) 150 MG/ML IM injection  naloxone (NARCAN) 4 MG/0.1ML nasal spray  omeprazole (PRILOSEC) 20 MG DR capsule  ondansetron (ZOFRAN) 8 MG tablet  oxyCODONE (OXYCONTIN) 10 MG 12 hr tablet  oxyCODONE IR (ROXICODONE) 15 MG tablet      Allergies   Allergen Reactions     Contrast Dye      Hives and breathing issues     Fish-Derived Products Hives     Seafood Hives     Diagnostic X-Ray Materials      Gadolinium      Family History  Family History   Problem Relation Age of Onset     Sickle Cell Trait Mother      Hypertension Mother      Asthma Mother      Sickle Cell Trait Father      Social History   Social History     Tobacco Use     Smoking status: Never Smoker     Smokeless tobacco: Never Used   Substance Use Topics     Alcohol use: Not Currently     Alcohol/week: 0.0 standard drinks     Drug use: Never      Past medical history, past surgical history, medications, allergies, family history, and social history were reviewed with the patient. No additional pertinent items.       Review of Systems   Constitutional: Negative for fever.   HENT: Negative for congestion.    Eyes: Negative for redness.   Respiratory: Negative for shortness of breath.    Cardiovascular: Positive for chest pain.   Gastrointestinal: Negative for abdominal pain.   Endocrine: Negative for polyuria.  "  Genitourinary: Negative for difficulty urinating.   Musculoskeletal: Positive for back pain. Negative for arthralgias and neck stiffness.        +leg pain b/l, lower back pain, left side pain   Skin: Negative for color change.   Allergic/Immunologic: Positive for immunocompromised state.   Neurological: Negative for headaches.   Hematological: Bruises/bleeds easily.   Psychiatric/Behavioral: Negative for confusion.     Physical Exam   BP: (!) 141/96  Pulse: 104  Temp: 98.9  F (37.2  C)  Resp: 16  Height: 162.6 cm (5' 4\")  Weight: 77.1 kg (170 lb)  SpO2: 92 %  Physical Exam  General: Afebrile, no acute distress   HEENT: Normocephalic, atraumatic, conjunctivae normal. MMM  Neck: non-tender, supple  Cardio: regular rate. regular rhythm   Resp: Normal work of breathing, no respiratory distress, lungs clear bilaterally, no wheezing, rhonchi, rales  Chest/Back: port in left chest wall c/d/i, no visual signs of trauma, no CVA tenderness   Abdomen: soft, non distension, no tenderness, no peritoneal signs   Neuro: alert and fully oriented. CN II-XII grossly intact. Grossly normal strength and sensation in all extremities.   MSK: no deformities. Normal range of motion  Integumentary/Skin: no rash visualized, normal color  Psych: normal affect, normal behavior    ED Course    1:20 AM  The patient was seen and examined by Jamee Martin MD in Room ED23.   Procedures    Results for orders placed or performed during the hospital encounter of 09/02/21   Chest XR,  PA & LAT     Status: None    Narrative    EXAM: XR CHEST 2 VW  LOCATION: [facility]  DATE/TIME: 9/2/2021 1:53 AM    INDICATION: sickle cell  COMPARISON: 07/21/2021      Impression    IMPRESSION: Left sided port with tip over atrial caval junction. No pneumothorax or pleural effusion. No focal consolidation. Cardiomediastinal silhouette within normal limits.   Extra Blue Top Tube     Status: None   Result Value Ref Range "    Hold Specimen JIC    Extra Red Top Tube     Status: None   Result Value Ref Range    Hold Specimen JIC    Extra Green Top (Lithium Heparin) Tube     Status: None   Result Value Ref Range    Hold Specimen JIC    Extra Purple Top Tube     Status: None   Result Value Ref Range    Hold Specimen JIC    CBC with platelets differential     Status: Abnormal    Narrative    The following orders were created for panel order CBC with platelets differential.  Procedure                               Abnormality         Status                     ---------                               -----------         ------                     CBC with platelets and d...[[id number]]  Abnormal            Final result               Manual Differential[[id number]]          Abnormal            Final result                 Please view results for these tests on the individual orders.   Comprehensive metabolic panel     Status: Abnormal   Result Value Ref Range    Sodium 138 133 - 144 mmol/L    Potassium 3.7 3.4 - 5.3 mmol/L    Chloride 110 (H) 94 - 109 mmol/L    Carbon Dioxide (CO2) 17 (L) 20 - 32 mmol/L    Anion Gap 11 3 - 14 mmol/L    Urea Nitrogen 8 7 - 30 mg/dL    Creatinine 0.55 0.52 - 1.04 mg/dL    Calcium 8.4 (L) 8.5 - 10.1 mg/dL    Glucose 94 70 - 99 mg/dL    Alkaline Phosphatase 77 40 - 150 U/L    AST      ALT 63 (H) 0 - 50 U/L    Protein Total 7.9 6.8 - 8.8 g/dL    Albumin 3.9 3.4 - 5.0 g/dL    Bilirubin Total 3.2 (H) 0.2 - 1.3 mg/dL    GFR Estimate >90 >60 mL/min/1.73m2   Reticulocyte count     Status: Abnormal   Result Value Ref Range    % Reticulocyte 26.9 (H) 0.5 - 2.0 %    Absolute Reticulocyte 0.629 (H) 0.025 - 0.095 10e6/uL   CBC with platelets and differential     Status: Abnormal   Result Value Ref Range    WBC Count 13.6 (H) 4.0 - 11.0 10e3/uL    RBC Count 2.34 (L) 3.80 - 5.20 10e6/uL    Hemoglobin 7.8 (L) 11.7 - 15.7 g/dL    Hematocrit 22.0 (L) 35.0 - 47.0 %    MCV 94 78 - 100 fL    MCH 33.3 (H) 26.5 - 33.0 pg    MCHC 35.5  31.5 - 36.5 g/dL    RDW 25.1 (H) 10.0 - 15.0 %    Platelet Count 304 150 - 450 10e3/uL   Manual Differential     Status: Abnormal   Result Value Ref Range    % Neutrophils 49 %    % Lymphocytes 35 %    % Monocytes 6 %    % Eosinophils 4 %    % Basophils 6 %    NRBCs per 100 WBC 25 (H) <=0 %    Absolute Neutrophils 6.7 1.6 - 8.3 10e3/uL    Absolute Lymphocytes 4.8 0.8 - 5.3 10e3/uL    Absolute Monocytes 0.8 0.0 - 1.3 10e3/uL    Absolute Eosinophils 0.5 0.0 - 0.7 10e3/uL    Absolute Basophils 0.8 (H) 0.0 - 0.2 10e3/uL    Absolute NRBCs 3.4 (H) <=0.0 10e3/uL    RBC Morphology Confirmed RBC Indices     Platelet Assessment  Automated Count Confirmed. Platelet morphology is normal.     Automated Count Confirmed. Platelet morphology is normal.    Elliptocytes Moderate (A) None Seen    RBC Fragments Slight (A) None Seen    Polychromasia Slight (A) None Seen    Sickle Cells Moderate (A) None Seen    Target Cells Slight (A) None Seen   [place] Draw     Status: None    Narrative    The following orders were created for panel order [place] Draw.  Procedure                               Abnormality         Status                     ---------                               -----------         ------                     Extra Blue Top Tube[[id number]]                              Final result               Extra Red Top Tube[[id number]]                               Final result               Extra Green Top (Lithium...[[id number]]                      Final result               Extra Purple Top Tube[[id number]]                            Final result                 Please view results for these tests on the individual orders.     Medications   lactated ringers BOLUS 1,000 mL (0 mLs Intravenous Stopped 9/2/21 0230)   morphine (PF) injection 2 mg (2 mg Intravenous Given 9/2/21 [id number])        Assessments & Plan (with Medical Decision Making)   Jennifer Cervantes is a 21 year old female with a PMH notable for sickle cell disease and  crisis, superficial venous thrombosis of right arm, cerebral infarction, acute respiratory failure with hypoxia, multiple subsegmental pulmonary emboli without acute cor pulmonale, oppositional defiant behavior and panic disorder. Patient presents to the ED for an evaluation of a sickle cell pain crisis.  Upon arrival patient is well-appearing, afebrile, mild distress secondary to pain.  Patient mildly tachycardic with heart rate 104, oxygen 92% on room air, blood pressure 141/96.  Patient here with pain that is consistent with her prior sickle cell pain crisis with pain in her legs, low back, patient did report some chest pain earlier and oxygen 92% (patient normally runs in the low 90s).  At this time will plan for comprehensive labs, pain control per pain management care plan, chest x-ray, and reevaluation.    I reviewed comprehensive labs are remarkable for white blood cell count of 13.6 (down from 14.8), hemoglobin 7.8 (increased from 7.2), no acute metabolic or electrolyte abnormality, no transaminitis, bilirubin 3.2.  Reticulocyte percentage 26.9, I reviewed chest x-ray which demonstrates no focal infiltrate, pleural effusion, pneumothorax.  On reevaluation after pain medication patient reports significant improvement in her symptoms, patient feels comfortable with discharge home and close outpatient follow-up.  At this time plan for discharge, continue home medications, and close follow-up with hematology/oncology team.  Return precautions discussed.  Patient understands and agrees with the plan.    I have reviewed the nursing notes. I have reviewed the findings, diagnosis, plan and need for follow up with the patient.    New Prescriptions    No medications on file       Final diagnoses:   Sickle cell pain crisis (H)       --  IAmalia, am serving as a trained medical scribe to document services personally performed by Jamee Martin MD, based on the provider's statements to me.     IJamee  ZAIN Martin MD, was physically present and have reviewed and verified the accuracy of this note documented by Amalia Garcia.    Jamee Martin MD  [facility] EMERGENCY DEPARTMENT  9/2/2021     Jamee Martin MD  09/02/21 0438

## 2021-09-04 ENCOUNTER — APPOINTMENT (OUTPATIENT)
Dept: ULTRASOUND IMAGING | Facility: CLINIC | Age: 21
End: 2021-09-04
Attending: EMERGENCY MEDICINE
Payer: COMMERCIAL

## 2021-09-04 ENCOUNTER — HOSPITAL ENCOUNTER (EMERGENCY)
Facility: CLINIC | Age: 21
Discharge: HOME | End: 2021-09-04
Attending: EMERGENCY MEDICINE | Admitting: EMERGENCY MEDICINE
Payer: COMMERCIAL

## 2021-09-04 ENCOUNTER — APPOINTMENT (OUTPATIENT)
Dept: GENERAL RADIOLOGY | Facility: CLINIC | Age: 21
End: 2021-09-04
Attending: EMERGENCY MEDICINE
Payer: COMMERCIAL

## 2021-09-04 VITALS
DIASTOLIC BLOOD PRESSURE: 82 MMHG | OXYGEN SATURATION: 91 % | RESPIRATION RATE: 16 BRPM | HEART RATE: 101 BPM | SYSTOLIC BLOOD PRESSURE: 127 MMHG | TEMPERATURE: 98.3 F | WEIGHT: 170 LBS | BODY MASS INDEX: 29.18 KG/M2

## 2021-09-04 DIAGNOSIS — D57.00 SICKLE CELL PAIN CRISIS (H): ICD-10-CM

## 2021-09-04 LAB
ALBUMIN SERPL-MCNC: 3.8 G/DL (ref 3.4–5)
ALP SERPL-CCNC: 72 U/L (ref 40–150)
ALT SERPL W P-5'-P-CCNC: 60 U/L (ref 0–50)
ANION GAP SERPL CALCULATED.3IONS-SCNC: 8 MMOL/L (ref 3–14)
AST SERPL W P-5'-P-CCNC: 78 U/L (ref 0–45)
BASOPHILS # BLD AUTO: 0.2 10E3/UL (ref 0–0.2)
BASOPHILS NFR BLD AUTO: 1 %
BILIRUB SERPL-MCNC: 3.9 MG/DL (ref 0.2–1.3)
BUN SERPL-MCNC: 8 MG/DL (ref 7–30)
CALCIUM SERPL-MCNC: 8.6 MG/DL (ref 8.5–10.1)
CHLORIDE BLD-SCNC: 111 MMOL/L (ref 94–109)
CO2 SERPL-SCNC: 20 MMOL/L (ref 20–32)
CREAT SERPL-MCNC: 0.56 MG/DL (ref 0.52–1.04)
EOSINOPHIL # BLD AUTO: 0.9 10E3/UL (ref 0–0.7)
EOSINOPHIL NFR BLD AUTO: 7 %
ERYTHROCYTE [DISTWIDTH] IN BLOOD BY AUTOMATED COUNT: 23.7 % (ref 10–15)
GFR SERPL CREATININE-BSD FRML MDRD: >90 ML/MIN/1.73M2
GLUCOSE BLD-MCNC: 93 MG/DL (ref 70–99)
HCT VFR BLD AUTO: 20.2 % (ref 35–47)
HGB BLD-MCNC: 7.4 G/DL (ref 11.7–15.7)
IMM GRANULOCYTES # BLD: 0.1 10E3/UL
IMM GRANULOCYTES NFR BLD: 0 %
LACTATE SERPL-SCNC: 0.7 MMOL/L (ref 0.7–2)
LYMPHOCYTES # BLD AUTO: 3.2 10E3/UL (ref 0.8–5.3)
LYMPHOCYTES NFR BLD AUTO: 23 %
MCH RBC QN AUTO: 33.3 PG (ref 26.5–33)
MCHC RBC AUTO-ENTMCNC: 36.6 G/DL (ref 31.5–36.5)
MCV RBC AUTO: 91 FL (ref 78–100)
MONOCYTES # BLD AUTO: 1.1 10E3/UL (ref 0–1.3)
MONOCYTES NFR BLD AUTO: 8 %
NEUTROPHILS # BLD AUTO: 8.2 10E3/UL (ref 1.6–8.3)
NEUTROPHILS NFR BLD AUTO: 61 %
NRBC # BLD AUTO: 0.7 10E3/UL
NRBC BLD AUTO-RTO: 5 /100
PLATELET # BLD AUTO: 280 10E3/UL (ref 150–450)
POTASSIUM BLD-SCNC: 3.2 MMOL/L (ref 3.4–5.3)
PROT SERPL-MCNC: 7.8 G/DL (ref 6.8–8.8)
RBC # BLD AUTO: 2.22 10E6/UL (ref 3.8–5.2)
RETICS # AUTO: 0.55 10E6/UL (ref 0.03–0.1)
RETICS/RBC NFR AUTO: 24.6 % (ref 0.5–2)
SODIUM SERPL-SCNC: 139 MMOL/L (ref 133–144)
TROPONIN I SERPL-MCNC: <0.015 UG/L (ref 0–0.04)
WBC # BLD AUTO: 13.5 10E3/UL (ref 4–11)

## 2021-09-04 PROCEDURE — 99285 EMERGENCY DEPT VISIT HI MDM: CPT | Mod: 25 | Performed by: EMERGENCY MEDICINE

## 2021-09-04 PROCEDURE — 93970 EXTREMITY STUDY: CPT

## 2021-09-04 PROCEDURE — 93005 ELECTROCARDIOGRAM TRACING: CPT | Performed by: EMERGENCY MEDICINE

## 2021-09-04 PROCEDURE — 85025 COMPLETE CBC W/AUTO DIFF WBC: CPT | Performed by: EMERGENCY MEDICINE

## 2021-09-04 PROCEDURE — 96374 THER/PROPH/DIAG INJ IV PUSH: CPT | Performed by: EMERGENCY MEDICINE

## 2021-09-04 PROCEDURE — 85045 AUTOMATED RETICULOCYTE COUNT: CPT | Performed by: EMERGENCY MEDICINE

## 2021-09-04 PROCEDURE — 36415 COLL VENOUS BLD VENIPUNCTURE: CPT | Performed by: EMERGENCY MEDICINE

## 2021-09-04 PROCEDURE — 83605 ASSAY OF LACTIC ACID: CPT | Performed by: EMERGENCY MEDICINE

## 2021-09-04 PROCEDURE — 71045 X-RAY EXAM CHEST 1 VIEW: CPT | Mod: 26 | Performed by: RADIOLOGY

## 2021-09-04 PROCEDURE — 93010 ELECTROCARDIOGRAM REPORT: CPT | Performed by: EMERGENCY MEDICINE

## 2021-09-04 PROCEDURE — 71045 X-RAY EXAM CHEST 1 VIEW: CPT

## 2021-09-04 PROCEDURE — 84484 ASSAY OF TROPONIN QUANT: CPT | Performed by: EMERGENCY MEDICINE

## 2021-09-04 PROCEDURE — 80053 COMPREHEN METABOLIC PANEL: CPT | Performed by: EMERGENCY MEDICINE

## 2021-09-04 PROCEDURE — 93970 EXTREMITY STUDY: CPT | Mod: 26 | Performed by: RADIOLOGY

## 2021-09-04 PROCEDURE — 250N000011 HC RX IP 250 OP 636: Performed by: EMERGENCY MEDICINE

## 2021-09-04 PROCEDURE — 96376 TX/PRO/DX INJ SAME DRUG ADON: CPT | Performed by: EMERGENCY MEDICINE

## 2021-09-04 RX ORDER — MORPHINE SULFATE 2 MG/ML
2 INJECTION, SOLUTION INTRAMUSCULAR; INTRAVENOUS
Status: COMPLETED | OUTPATIENT
Start: 2021-09-04 | End: 2021-09-04

## 2021-09-04 RX ORDER — HEPARIN SODIUM (PORCINE) LOCK FLUSH IV SOLN 100 UNIT/ML 100 UNIT/ML
100 SOLUTION INTRAVENOUS ONCE
Status: COMPLETED | OUTPATIENT
Start: 2021-09-04 | End: 2021-09-04

## 2021-09-04 RX ADMIN — Medication 500 UNITS: at 04:36

## 2021-09-04 RX ADMIN — MORPHINE SULFATE 2 MG: 2 INJECTION, SOLUTION INTRAMUSCULAR; INTRAVENOUS at 02:17

## 2021-09-04 RX ADMIN — MORPHINE SULFATE 2 MG: 2 INJECTION, SOLUTION INTRAMUSCULAR; INTRAVENOUS at 01:18

## 2021-09-04 RX ADMIN — MORPHINE SULFATE 2 MG: 2 INJECTION, SOLUTION INTRAMUSCULAR; INTRAVENOUS at 03:20

## 2021-09-04 ASSESSMENT — ENCOUNTER SYMPTOMS
WEAKNESS: 0
SHORTNESS OF BREATH: 0
ABDOMINAL PAIN: 0
COUGH: 0
CONFUSION: 0
DIFFICULTY URINATING: 0
DYSURIA: 0
BRUISES/BLEEDS EASILY: 1
PALPITATIONS: 0
FEVER: 0
EYE REDNESS: 0
NECK STIFFNESS: 0
ARTHRALGIAS: 0
BACK PAIN: 1
COLOR CHANGE: 0
HEADACHES: 0

## 2021-09-04 NOTE — ED TRIAGE NOTES
Reports sickle cell pain in ower back for approximately 6 sheeba, took home medications, a ew hours ago also started having chest pain and R sided pain.

## 2021-09-04 NOTE — ED PROVIDER NOTES
[place] EMERGENCY DEPARTMENT ([facility])  September 4, 2021  ED 19 12:59 AM   History     Chief Complaint   Patient presents with     Sickle Cell Pain Crisis     Chest Pain     The history is provided by the patient and medical records.     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  She was seen in the emergency department last night by myself for pain related to sickle cell disease.  She was evaluated with labs and chest x-ray, and treated with pain medications.  Patient reported improvement with the lactated Ringer's, morphine 2 mg x 3 that we gave her and so she was discharged to home.  At home tonight she developed recurrence of chest pain and so presents for evaluation.  Given her complex history she just wanted to make sure that nothing sinister or severe was going on.  She also has pain in her legs and back.  The pain is sharp in nature.  The pain is all equally severe.  She notes feeling short of breath earlier but this has since improved.  No cough or shortness of breath at this time. Has tried OxyContin, oxycodone, ibuprofen and Tylenol without improvement.  She was also being on some sort of new medication for joint pain, not sure what this is.  She has taken her anticoagulation, no missed doses.  She came back to the ED today because she just wants to feel better. No nausea, vomiting, diarrhea.      PAST MEDICAL HISTORY:   Past Medical History:   Diagnosis Date     Anxiety      Bleeding disorder (H)      Blood clotting disorder (H)      Cerebral infarction (H) 2015     Cognitive developmental delay     low IQ. 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 (H)     right hand  contractures     Iron overload due to repeated red blood cell transfusions      Migraines      Multiple subsegmental pulmonary emboli without acute cor pulmonale (H) 02/01/2021     Oppositional defiant behavior      Superficial venous thrombosis of arm, right 03/25/2021     Uncomplicated asthma        PAST SURGICAL HISTORY:   Past Surgical History:   Procedure Laterality Date     AS INSERT TUNNELED CV 2 CATH W/O PORT/PUMP       C BREAST REDUCTION (INCLUDES LIPO) TIER 3 Bilateral 04/23/2019     CHOLECYSTECTOMY       INSERT PORT VASCULAR ACCESS Left 4/21/2021    Procedure: INSERTION, VASCULAR ACCESS PORT (NOT SURE ON SIDE UNTIL REMOVAL);  Surgeon: Rajan More MD;  Location: [facility] OR     IR CHEST PORT PLACEMENT > 5 YRS OF AGE  4/21/2021     IR CVC NON TUNNEL LINE REMOVAL  5/6/2021     IR CVC NON TUNNEL PLACEMENT  04/07/2020     IR CVC NON TUNNEL PLACEMENT  4/30/2021     IR PORT REMOVAL LEFT  4/21/2021     REMOVE PORT VASCULAR ACCESS Left 4/21/2021    Procedure: REMOVAL, VASCULAR ACCESS PORT LEFT;  Surgeon: Rajan More MD;  Location: [facility] OR     REPAIR TENDON ELBOW Right 10/02/2019    Procedure: Right Elbow Flexor Lengthening, Flexor Pronator Slide Of Wrist and Finger, Thumb Adductor Lengthening;  Surgeon: Anai Franco MD;  Location: [facility] OR     TONSILLECTOMY Bilateral 10/02/2019    Procedure: Bilateral Tonsillectomy;  Surgeon: Farhana Guy MD;  Location: [facility] OR       Past medical history, past surgical history, medications, and allergies were reviewed with the patient. Additional pertinent items: None    FAMILY HISTORY:   Family History   Problem Relation Age of Onset     Sickle Cell Trait Mother      Hypertension Mother      Asthma Mother      Sickle Cell Trait Father        SOCIAL HISTORY:   Social History     Tobacco Use     Smoking status: Never Smoker     Smokeless tobacco: Never Used   Substance Use Topics     Alcohol use: Not Currently     Alcohol/week: 0.0 standard drinks     Social  history was reviewed with the patient. Additional pertinent items: None      Discharge Medication List as of 9/4/2021  4:33 AM      CONTINUE these medications which have NOT CHANGED    Details   acetaminophen (TYLENOL) 325 MG tablet Take 2 tablets (650 mg) by mouth every 6 hours as needed for mild pain, Disp-120 tablet, R-3, E-Prescribe      albuterol (PROAIR HFA/PROVENTIL HFA/VENTOLIN HFA) 108 (90 Base) MCG/ACT inhaler Inhale 2 puffs into the lungs every 6 hours as needed for shortness of breath / dyspnea or wheezing, Disp-8.5 g, R-3, E-PrescribePharmacy may dispense brand covered by insurance (Proair, or proventil or ventolin or generic albuterol inhaler)      albuterol (PROVENTIL) (2.5 MG/3ML) 0.083% neb solution Take 1 vial (2.5 mg) by nebulization every 6 hours as needed for shortness of breath / dyspnea or wheezing, Disp-12 mL, R-4, E-Prescribe      ARIPiprazole (ABILIFY) 2 MG tablet Take 1 tablet (2 mg) by mouth daily, Disp-30 tablet, R-3, E-Prescribe      aspirin (ASA) 81 MG chewable tablet Take 1 tablet (81 mg) by mouth daily, No Print Out      budesonide-formoterol (SYMBICORT) 160-4.5 MCG/ACT Inhaler Inhale 1 puff into the lungs every evening, Disp-10.2 g, R-3, Historical      dabigatran ANTICOAGULANT (PRADAXA) 150 MG capsule Take 1 capsule (150 mg) by mouth 2 times daily Store in original 's bottle or blister pack; use within 120 days of opening., Disp-60 capsule, R-0, E-Prescribe      diclofenac (VOLTAREN) 1 % topical gel Apply 4 g topically 4 times daily as needed for moderate pain Apply to back, legs, and arms for pain, Disp-150 g, R-3, E-Prescribe      diphenhydrAMINE (BENADRYL) 25 MG capsule Take 1-2 capsules (25-50 mg) by mouth nightly as needed for sleep, Disp-60 capsule, R-3, E-Prescribe      DULoxetine (CYMBALTA) 30 MG capsule Take 1 capsule (30 mg) by mouth daily for 7 days, THEN 1 capsule (30 mg) 2 times daily., Disp-187 capsule, R-0, E-Prescribe      EPINEPHrine (ANY BX GENERIC  EQUIV) 0.3 MG/0.3ML injection 2-pack Inject 0.3 mLs (0.3 mg) into the muscle as needed for anaphylaxis, Disp-1 each, R-1, E-Prescribe      Hydroxyurea 1000 MG TABS Take 2,000 mg by mouth daily, Disp-60 tablet, R-3, E-Prescribe      hydrOXYzine (ATARAX) 25 MG tablet Take 1 tablet (25 mg) by mouth 3 times daily as needed for anxiety, Disp-30 tablet, R-1, E-Prescribe      JADENU 360 MG tablet Take 4 tablets (1,440 mg) by mouth every evening, Disp-120 tablet, R-4, SHIELA, E-Prescribe      lidocaine-prilocaine (EMLA) 2.5-2.5 % external cream Apply topically as needed for moderate painDisp-30 g, [id number]      medroxyPROGESTERone (DEPO-PROVERA) 150 MG/ML IM injection Inject 150 mg into the muscle, Historical      naloxone (NARCAN) 4 MG/0.1ML nasal spray Spray 1 spray (4 mg) into one nostril alternating nostrils once as needed for opioid reversal every 2-3 minutes until assistance arrives, Disp-0.2 mL, R-1, E-Prescribe      omeprazole (PRILOSEC) 20 MG DR capsule Take 1 capsule (20 mg) by mouth daily, Disp-30 capsule, R-1, E-Prescribe      ondansetron (ZOFRAN) 8 MG tablet Take 8 mg by mouth every 8 hours as needed , Historical      oxyCODONE (OXYCONTIN) 10 MG 12 hr tablet Take 1 tablet (10 mg) by mouth every 12 hours, Disp-60 tablet, R-0, E-Prescribe      oxyCODONE IR (ROXICODONE) 15 MG tablet Take 1 tablet (15mg) by mouth every 4-6 hours as needed for severe pain. Goal 4 per day. Max 6 per day., Disp-60 tablet, R-0, E-Prescribe                Allergies   Allergen Reactions     Contrast Dye      Hives and breathing issues     Fish-Derived Products Hives     Seafood Hives     Diagnostic X-Ray Materials      Gadolinium         Review of Systems   Constitutional: Negative for fever.   HENT: Negative for congestion.    Eyes: Negative for redness and visual disturbance.   Respiratory: Negative for cough and shortness of breath.    Cardiovascular: Positive for chest pain. Negative for palpitations and leg swelling.    Gastrointestinal: Negative for abdominal pain.   Endocrine: Negative for polyuria.   Genitourinary: Negative for difficulty urinating and dysuria.   Musculoskeletal: Positive for back pain. Negative for arthralgias and neck stiffness.        Leg pain, back pain   Skin: Negative for color change and rash.   Allergic/Immunologic: Positive for immunocompromised state.   Neurological: Negative for weakness and headaches.   Hematological: Bruises/bleeds easily.   Psychiatric/Behavioral: Negative for confusion.     Physical Exam   BP: (!) 145/77  Pulse: 116  Temp: 98.3  F (36.8  C)  Resp: 16  Weight: 77.1 kg (170 lb)  SpO2: 92 %    Physical Exam  General: Afebrile, no acute distress   HEENT: Normocephalic, atraumatic, conjunctivae normal. MMM  Neck: non-tender, supple  Cardio: regular rate. regular rhythm   Resp: Normal work of breathing, no respiratory distress, lungs clear bilaterally, no wheezing, rhonchi, rales  Chest/Back: no visual signs of trauma, no CVA tenderness   Abdomen: soft, non distension, no tenderness, no peritoneal signs   Neuro: alert and fully oriented. CN II-XII grossly intact. Grossly normal strength and sensation in all extremities.   MSK: no deformities, no lower extremity edema, no calf tenderness. Normal range of motion  Integumentary/Skin: no rash visualized, normal color  Psych: normal affect, normal behavior    ED Course        Procedures         EKG Interpretation:      Interpreted by Jamee Martin MD  Time reviewed: 0101  Symptoms at time of EKG: chest pain   Rhythm: sinus tachycardia  Rate: Tachycardia - 105 bpm   Axis: Normal  Ectopy: none  Conduction: normal  ST Segments/ T Waves: nonspecific T wave change, No acute ischemic changes  Q Waves: none  Comparison to prior: Unchanged    Clinical Impression: sinus tachycardia 105 bpm, no acute ischemic change     Results for orders placed or performed during the hospital encounter of 09/04/21 (from the past 24 hour(s))   EKG 12-lead,  tracing only   Result Value Ref Range    Systolic Blood Pressure  mmHg    Diastolic Blood Pressure  mmHg    Ventricular Rate 105 BPM    Atrial Rate 105 BPM    LA Interval 146 ms    QRS Duration 72 ms     ms    QTc 481 ms    P Axis 61 degrees    R AXIS 34 degrees    T Axis 19 degrees    Interpretation ECG       Sinus tachycardia  Nonspecific T wave abnormality  Abnormal ECG     Lactic acid whole blood   Result Value Ref Range    Lactic Acid 0.7 0.7 - 2.0 mmol/L   CBC with platelets differential    Narrative    The following orders were created for panel order CBC with platelets differential.  Procedure                               Abnormality         Status                     ---------                               -----------         ------                     CBC with platelets and d...[[id number]]  Abnormal            Final result                 Please view results for these tests on the individual orders.   Comprehensive metabolic panel   Result Value Ref Range    Sodium 139 133 - 144 mmol/L    Potassium 3.2 (L) 3.4 - 5.3 mmol/L    Chloride 111 (H) 94 - 109 mmol/L    Carbon Dioxide (CO2) 20 20 - 32 mmol/L    Anion Gap 8 3 - 14 mmol/L    Urea Nitrogen 8 7 - 30 mg/dL    Creatinine 0.56 0.52 - 1.04 mg/dL    Calcium 8.6 8.5 - 10.1 mg/dL    Glucose 93 70 - 99 mg/dL    Alkaline Phosphatase 72 40 - 150 U/L    AST 78 (H) 0 - 45 U/L    ALT 60 (H) 0 - 50 U/L    Protein Total 7.8 6.8 - 8.8 g/dL    Albumin 3.8 3.4 - 5.0 g/dL    Bilirubin Total 3.9 (H) 0.2 - 1.3 mg/dL    GFR Estimate >90 >60 mL/min/1.73m2   Reticulocyte count   Result Value Ref Range    % Reticulocyte 24.6 (H) 0.5 - 2.0 %    Absolute Reticulocyte 0.547 (H) 0.025 - 0.095 10e6/uL   CBC with platelets and differential   Result Value Ref Range    WBC Count 13.5 (H) 4.0 - 11.0 10e3/uL    RBC Count 2.22 (L) 3.80 - 5.20 10e6/uL    Hemoglobin 7.4 (L) 11.7 - 15.7 g/dL    Hematocrit 20.2 (L) 35.0 - 47.0 %    MCV 91 78 - 100 fL    MCH 33.3 (H) 26.5 - 33.0 pg     MCHC 36.6 (H) 31.5 - 36.5 g/dL    RDW 23.7 (H) 10.0 - 15.0 %    Platelet Count 280 150 - 450 10e3/uL    % Neutrophils 61 %    % Lymphocytes 23 %    % Monocytes 8 %    % Eosinophils 7 %    % Basophils 1 %    % Immature Granulocytes 0 %    NRBCs per 100 WBC 5 (H) <1 /100    Absolute Neutrophils 8.2 1.6 - 8.3 10e3/uL    Absolute Lymphocytes 3.2 0.8 - 5.3 10e3/uL    Absolute Monocytes 1.1 0.0 - 1.3 10e3/uL    Absolute Eosinophils 0.9 (H) 0.0 - 0.7 10e3/uL    Absolute Basophils 0.2 0.0 - 0.2 10e3/uL    Absolute Immature Granulocytes 0.1 (H) <=0.0 10e3/uL    Absolute NRBCs 0.7 10e3/uL   Troponin I   Result Value Ref Range    Troponin I <0.015 0.000 - 0.045 ug/L   US Lower Extremity Venous Duplex Bilateral    Impression    RESIDENT PRELIMINARY INTERPRETATION  IMPRESSION:  1.  No evidence of deep venous thrombosis in either lower extremity.   XR Chest Port 1 View    Narrative    EXAM: XR CHEST PORT 1 VIEW  LOCATION: [facility]  DATE/TIME: 9/4/2021 4:08 AM    INDICATION: chest pain, sickle cell  COMPARISON: 09/02/2021.      Impression    IMPRESSION: Heart size within normal limits for portable technique. Left sided chest port catheter tip overlies cavoatrial junction. No evidence of pneumonia. No visible pneumothorax or pleural effusion.     *Note: Due to a large number of results and/or encounters for the requested time period, some results have not been displayed. A complete set of results can be found in Results Review.     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    Upon arrival patient is well-appearing, afebrile, mild distress secondary to pain.  Patient mildly tachycardic with heart rate 105 bpm, no respiratory distress.  Patient here with sickle cell pain crisis with pain in her chest, left side, low back, and bilateral legs.  At this time will plan for EKG, comprehensive labs, chest x-ray, and reevaluate.  Will treat with pain per care plan and re-evaluate.     I reviewed comprehensive labs which are remarkable for white blood cell count of 13.5, hemoglobin 7.4, potassium 3.2, AST 78, ALT 60, bilirubin 3.9, reticulocyte count 24.6, normal lactic acid 0.7, negative troponin.  Laboratory testing similar to prior labs and baseline on chart review.  Given patient's chest pain, history of pulmonary embolisms, currently on Pradaxa, low suspicious for pulmonary embolism given patient is not hypoxic, no respiratory distress, unable to obtain a CT scan of the chest due to patient's allergy to contrast dye so at this time will continue pain control and obtain ultrasound of her bilateral lower extremities and reevaluate.  Patient recently had echocardiogram a few days ago on 8/29/2021 which was unremarkable with an ejection fraction of 55 to 60%, normal left and right ventricular function, normal right ventricular size.      I reviewed ultrasound of bilateral lower extremity which is unremarkable with no evidence of acute DVT, and reviewed chest x-ray which is unremarkable with no focal infiltrate, no evidence of pneumonia, pneumothorax, pleural effusion, or acute chest.  On reevaluation patient resting comfortably with improvement of her symptoms after IV morphine.  I discussed results with patient, at this time patient is on chronic anticoagulation, recently had echocardiogram which was unremarkable, ultrasound negative, and overall patient is in no respiratory distress, vital signs near baseline.  I did offer patient to stay in the emergency department for nuclear  medicine VQ scan this morning however patient feels comfortable and preferred to go home and follow-up outpatient.  At this time plan for discharge home, return precautions discussed if any recurrent or worsening chest pain, shortness of breath, or worsening symptoms.  Patient understands agrees the plan.    I have reviewed the nursing notes.    I have reviewed the findings, diagnosis, plan and need for follow up with the patient.    Discharge Medication List as of 9/4/2021  4:33 AM          Final diagnoses:   Sickle cell pain crisis (H)       I, Bambi Tejeda, am serving as a trained medical scribe to document services personally performed by Jamee Martin MD based on the provider's statements to me on September 4, 2021.  This document has been checked and approved by the attending provider.    I, Jamee Martin MD, was physically present and have reviewed and verified the accuracy of this note documented by Bambi Tejeda, medical scribe.      Jamee Martin MD     9/4/2021   [facility] EMERGENCY DEPARTMENT     Jamee Martin MD  09/04/21 3723

## 2021-09-05 LAB
ATRIAL RATE - MUSE: 105 BPM
DIASTOLIC BLOOD PRESSURE - MUSE: NORMAL MMHG
INTERPRETATION ECG - MUSE: NORMAL
P AXIS - MUSE: 61 DEGREES
PR INTERVAL - MUSE: 146 MS
QRS DURATION - MUSE: 72 MS
QT - MUSE: 364 MS
QTC - MUSE: 481 MS
R AXIS - MUSE: 34 DEGREES
SYSTOLIC BLOOD PRESSURE - MUSE: NORMAL MMHG
T AXIS - MUSE: 19 DEGREES
VENTRICULAR RATE- MUSE: 105 BPM

## 2021-09-06 PROCEDURE — 99285 EMERGENCY DEPT VISIT HI MDM: CPT | Performed by: EMERGENCY MEDICINE

## 2021-09-06 PROCEDURE — 99285 EMERGENCY DEPT VISIT HI MDM: CPT | Mod: 25

## 2021-09-07 ENCOUNTER — HOME INFUSION (PRE-WILLOW HOME INFUSION) (OUTPATIENT)
Dept: PHARMACY | Facility: CLINIC | Age: 21
End: 2021-09-07

## 2021-09-07 ENCOUNTER — HOSPITAL ENCOUNTER (EMERGENCY)
Facility: CLINIC | Age: 21
Discharge: HOME | End: 2021-09-07
Attending: EMERGENCY MEDICINE | Admitting: EMERGENCY MEDICINE
Payer: COMMERCIAL

## 2021-09-07 VITALS
TEMPERATURE: 98.2 F | OXYGEN SATURATION: 97 % | BODY MASS INDEX: 29.02 KG/M2 | SYSTOLIC BLOOD PRESSURE: 105 MMHG | HEIGHT: 64 IN | RESPIRATION RATE: 16 BRPM | DIASTOLIC BLOOD PRESSURE: 51 MMHG | WEIGHT: 170 LBS | HEART RATE: 94 BPM

## 2021-09-07 DIAGNOSIS — D57.00 SICKLE CELL PAIN CRISIS (H): ICD-10-CM

## 2021-09-07 PROBLEM — R25.2 SPASTICITY: Status: ACTIVE | Noted: 2021-09-07

## 2021-09-07 LAB
ALBUMIN SERPL-MCNC: 3.8 G/DL (ref 3.4–5)
ALP SERPL-CCNC: 73 U/L (ref 40–150)
ALT SERPL W P-5'-P-CCNC: 54 U/L (ref 0–50)
ANION GAP SERPL CALCULATED.3IONS-SCNC: 6 MMOL/L (ref 3–14)
AST SERPL W P-5'-P-CCNC: 81 U/L (ref 0–45)
BASOPHILS # BLD MANUAL: 0.6 10E3/UL (ref 0–0.2)
BASOPHILS NFR BLD MANUAL: 4 %
BILIRUB SERPL-MCNC: 3.4 MG/DL (ref 0.2–1.3)
BUN SERPL-MCNC: 12 MG/DL (ref 7–30)
CALCIUM SERPL-MCNC: 8.4 MG/DL (ref 8.5–10.1)
CHLORIDE BLD-SCNC: 110 MMOL/L (ref 94–109)
CO2 SERPL-SCNC: 22 MMOL/L (ref 20–32)
CREAT SERPL-MCNC: 0.71 MG/DL (ref 0.52–1.04)
EOSINOPHIL # BLD MANUAL: 0.6 10E3/UL (ref 0–0.7)
EOSINOPHIL NFR BLD MANUAL: 4 %
ERYTHROCYTE [DISTWIDTH] IN BLOOD BY AUTOMATED COUNT: 25.4 % (ref 10–15)
GFR SERPL CREATININE-BSD FRML MDRD: >90 ML/MIN/1.73M2
GLUCOSE BLD-MCNC: 88 MG/DL (ref 70–99)
HCT VFR BLD AUTO: 21.1 % (ref 35–47)
HGB BLD-MCNC: 7.7 G/DL (ref 11.7–15.7)
LYMPHOCYTES # BLD MANUAL: 5.5 10E3/UL (ref 0.8–5.3)
LYMPHOCYTES NFR BLD MANUAL: 35 %
MCH RBC QN AUTO: 33.5 PG (ref 26.5–33)
MCHC RBC AUTO-ENTMCNC: 36.5 G/DL (ref 31.5–36.5)
MCV RBC AUTO: 92 FL (ref 78–100)
MONOCYTES # BLD MANUAL: 0.9 10E3/UL (ref 0–1.3)
MONOCYTES NFR BLD MANUAL: 6 %
NEUTROPHILS # BLD MANUAL: 8 10E3/UL (ref 1.6–8.3)
NEUTROPHILS NFR BLD MANUAL: 51 %
NRBC # BLD AUTO: 2.7 10E3/UL
NRBC BLD MANUAL-RTO: 17 %
PLAT MORPH BLD: ABNORMAL
PLATELET # BLD AUTO: 308 10E3/UL (ref 150–450)
POTASSIUM BLD-SCNC: 3.6 MMOL/L (ref 3.4–5.3)
PROT SERPL-MCNC: 7.9 G/DL (ref 6.8–8.8)
RBC # BLD AUTO: 2.3 10E6/UL (ref 3.8–5.2)
RBC MORPH BLD: ABNORMAL
RETICS # AUTO: 0.6 10E6/UL (ref 0.03–0.1)
RETICS/RBC NFR AUTO: 26.2 % (ref 0.5–2)
SICKLE CELLS BLD QL SMEAR: ABNORMAL
SODIUM SERPL-SCNC: 138 MMOL/L (ref 133–144)
TARGETS BLD QL SMEAR: ABNORMAL
WBC # BLD AUTO: 15.6 10E3/UL (ref 4–11)

## 2021-09-07 PROCEDURE — 85045 AUTOMATED RETICULOCYTE COUNT: CPT | Performed by: EMERGENCY MEDICINE

## 2021-09-07 PROCEDURE — 96376 TX/PRO/DX INJ SAME DRUG ADON: CPT

## 2021-09-07 PROCEDURE — 36415 COLL VENOUS BLD VENIPUNCTURE: CPT | Performed by: EMERGENCY MEDICINE

## 2021-09-07 PROCEDURE — 82374 ASSAY BLOOD CARBON DIOXIDE: CPT | Performed by: EMERGENCY MEDICINE

## 2021-09-07 PROCEDURE — 85048 AUTOMATED LEUKOCYTE COUNT: CPT | Performed by: EMERGENCY MEDICINE

## 2021-09-07 PROCEDURE — 250N000011 HC RX IP 250 OP 636: Performed by: EMERGENCY MEDICINE

## 2021-09-07 PROCEDURE — 96374 THER/PROPH/DIAG INJ IV PUSH: CPT

## 2021-09-07 RX ORDER — MORPHINE SULFATE 2 MG/ML
2 INJECTION, SOLUTION INTRAMUSCULAR; INTRAVENOUS
Status: COMPLETED | OUTPATIENT
Start: 2021-09-07 | End: 2021-09-07

## 2021-09-07 RX ORDER — HEPARIN SODIUM (PORCINE) LOCK FLUSH IV SOLN 100 UNIT/ML 100 UNIT/ML
5-10 SOLUTION INTRAVENOUS
Status: DISCONTINUED | OUTPATIENT
Start: 2021-09-07 | End: 2021-09-07 | Stop reason: HOSPADM

## 2021-09-07 RX ADMIN — MORPHINE SULFATE 2 MG: 2 INJECTION, SOLUTION INTRAMUSCULAR; INTRAVENOUS at 03:38

## 2021-09-07 RX ADMIN — Medication 5 ML: at 06:36

## 2021-09-07 RX ADMIN — MORPHINE SULFATE 2 MG: 2 INJECTION, SOLUTION INTRAMUSCULAR; INTRAVENOUS at 02:27

## 2021-09-07 RX ADMIN — MORPHINE SULFATE 2 MG: 2 INJECTION, SOLUTION INTRAMUSCULAR; INTRAVENOUS at 04:46

## 2021-09-07 ASSESSMENT — ENCOUNTER SYMPTOMS
ABDOMINAL PAIN: 0
DYSURIA: 0
FEVER: 0
PALPITATIONS: 0
BRUISES/BLEEDS EASILY: 1
SHORTNESS OF BREATH: 0
WEAKNESS: 0
NAUSEA: 0
COUGH: 0
VOMITING: 0
CONFUSION: 0
CHILLS: 0

## 2021-09-07 ASSESSMENT — MIFFLIN-ST. JEOR: SCORE: 1516.11

## 2021-09-07 NOTE — ED PROVIDER NOTES
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  Patient reports that she has been doing well since her last emergency department visit however this evening she was taking a bath and had worsening of her sickle cell pain.  Patient describes the pain as pain on the left side of her body as well as her low back and diffusely all over her body.  Patient states this is typical to her sickle cell pain crises and describes it as a constant sharp pain.  Patient reports taking her pain medications and her other home medications including her anticoagulation as directed.  She denies any fever, chills, cough, shortness of breath, chest pain, abdominal pain, nausea, vomiting, diarrhea, no other complaints.    Per review of the chart, patient has been seen twice in the past week for sickle cell pain crises.  Both times patient was evaluated with labs and chest x-ray, and then treated with pain medication.    PAST MEDICAL HISTORY:   Past Medical History:   Diagnosis Date     Anxiety      Bleeding disorder (H)      Blood clotting disorder (H)      Cerebral infarction (H) 2015     Cognitive developmental delay     low IQ. 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 (H)     right hand contractures     Iron overload due to repeated red blood cell transfusions      Migraines      Multiple subsegmental pulmonary emboli without acute cor pulmonale (H) 02/01/2021     Oppositional defiant behavior       Superficial venous thrombosis of arm, right 03/25/2021     Uncomplicated asthma        PAST SURGICAL HISTORY:   Past Surgical History:   Procedure Laterality Date     AS INSERT TUNNELED CV 2 CATH W/O PORT/PUMP       C BREAST REDUCTION (INCLUDES LIPO) TIER 3 Bilateral 04/23/2019     CHOLECYSTECTOMY       INSERT PORT VASCULAR ACCESS Left 4/21/2021    Procedure: INSERTION, VASCULAR ACCESS PORT (NOT SURE ON SIDE UNTIL REMOVAL);  Surgeon: Rajan More MD;  Location: [facility] OR     IR CHEST PORT PLACEMENT > 5 YRS OF AGE  4/21/2021     IR CVC NON TUNNEL LINE REMOVAL  5/6/2021     IR CVC NON TUNNEL PLACEMENT  04/07/2020     IR CVC NON TUNNEL PLACEMENT  4/30/2021     IR PORT REMOVAL LEFT  4/21/2021     REMOVE PORT VASCULAR ACCESS Left 4/21/2021    Procedure: REMOVAL, VASCULAR ACCESS PORT LEFT;  Surgeon: Rajan More MD;  Location: [facility] OR     REPAIR TENDON ELBOW Right 10/02/2019    Procedure: Right Elbow Flexor Lengthening, Flexor Pronator Slide Of Wrist and Finger, Thumb Adductor Lengthening;  Surgeon: Anai Franco MD;  Location: [facility] OR     TONSILLECTOMY Bilateral 10/02/2019    Procedure: Bilateral Tonsillectomy;  Surgeon: Farhana Guy MD;  Location: [facility] OR       Past medical history, past surgical history, medications, and allergies were reviewed with the patient. Additional pertinent items: None    FAMILY HISTORY:   Family History   Problem Relation Age of Onset     Sickle Cell Trait Mother      Hypertension Mother      Asthma Mother      Sickle Cell Trait Father        SOCIAL HISTORY:   Social History     Tobacco Use     Smoking status: Never Smoker     Smokeless tobacco: Never Used   Substance Use Topics     Alcohol use: Not Currently     Alcohol/week: 0.0 standard drinks     Social history was reviewed with the patient. Additional pertinent items: None      Patient's Medications   New Prescriptions    No medications on file   Previous Medications    ACETAMINOPHEN (TYLENOL) 325 MG TABLET    Take 2  tablets (650 mg) by mouth every 6 hours as needed for mild pain    ALBUTEROL (PROAIR HFA/PROVENTIL HFA/VENTOLIN HFA) 108 (90 BASE) MCG/ACT INHALER    Inhale 2 puffs into the lungs every 6 hours as needed for shortness of breath / dyspnea or wheezing    ALBUTEROL (PROVENTIL) (2.5 MG/3ML) 0.083% NEB SOLUTION    Take 1 vial (2.5 mg) by nebulization every 6 hours as needed for shortness of breath / dyspnea or wheezing    ARIPIPRAZOLE (ABILIFY) 2 MG TABLET    Take 1 tablet (2 mg) by mouth daily    ASPIRIN (ASA) 81 MG CHEWABLE TABLET    Take 1 tablet (81 mg) by mouth daily    BUDESONIDE-FORMOTEROL (SYMBICORT) 160-4.5 MCG/ACT INHALER    Inhale 1 puff into the lungs every evening    DABIGATRAN ANTICOAGULANT (PRADAXA) 150 MG CAPSULE    Take 1 capsule (150 mg) by mouth 2 times daily Store in original 's bottle or blister pack; use within 120 days of opening.    DICLOFENAC (VOLTAREN) 1 % TOPICAL GEL    Apply 4 g topically 4 times daily as needed for moderate pain Apply to back, legs, and arms for pain    DIPHENHYDRAMINE (BENADRYL) 25 MG CAPSULE    Take 1-2 capsules (25-50 mg) by mouth nightly as needed for sleep    DULOXETINE (CYMBALTA) 30 MG CAPSULE    Take 1 capsule (30 mg) by mouth daily for 7 days, THEN 1 capsule (30 mg) 2 times daily.    EPINEPHRINE (ANY BX GENERIC EQUIV) 0.3 MG/0.3ML INJECTION 2-PACK    Inject 0.3 mLs (0.3 mg) into the muscle as needed for anaphylaxis    HYDROXYUREA 1000 MG TABS    Take 2,000 mg by mouth daily    HYDROXYZINE (ATARAX) 25 MG TABLET    Take 1 tablet (25 mg) by mouth 3 times daily as needed for anxiety    JADENU 360 MG TABLET    Take 4 tablets (1,440 mg) by mouth every evening    LIDOCAINE-PRILOCAINE (EMLA) 2.5-2.5 % EXTERNAL CREAM    Apply topically as needed for moderate pain    MEDROXYPROGESTERONE (DEPO-PROVERA) 150 MG/ML IM INJECTION    Inject 150 mg into the muscle    NALOXONE (NARCAN) 4 MG/0.1ML NASAL SPRAY    Spray 1 spray (4 mg) into one nostril alternating nostrils once  "as needed for opioid reversal every 2-3 minutes until assistance arrives    OMEPRAZOLE (PRILOSEC) 20 MG DR CAPSULE    Take 1 capsule (20 mg) by mouth daily    ONDANSETRON (ZOFRAN) 8 MG TABLET    Take 8 mg by mouth every 8 hours as needed     OXYCODONE (OXYCONTIN) 10 MG 12 HR TABLET    Take 1 tablet (10 mg) by mouth every 12 hours    OXYCODONE IR (ROXICODONE) 15 MG TABLET    Take 1 tablet (15mg) by mouth every 4-6 hours as needed for severe pain. Goal 4 per day. Max 6 per day.   Modified Medications    No medications on file   Discontinued Medications    No medications on file          Allergies   Allergen Reactions     Contrast Dye      Hives and breathing issues     Fish-Derived Products Hives     Seafood Hives     Diagnostic X-Ray Materials      Gadolinium         Review of Systems   Constitutional: Negative for chills and fever.   HENT: Negative for congestion.    Eyes: Negative for visual disturbance.   Respiratory: Negative for cough and shortness of breath.    Cardiovascular: Negative for chest pain, palpitations and leg swelling.   Gastrointestinal: Negative for abdominal pain, nausea and vomiting.   Endocrine: Negative for polyuria.   Genitourinary: Negative for dysuria.   Musculoskeletal:        Left side pain, low back pain, all over body joint pains   Skin: Negative for rash.   Allergic/Immunologic: Positive for immunocompromised state.   Neurological: Negative for weakness.   Hematological: Bruises/bleeds easily.   Psychiatric/Behavioral: Negative for confusion.       Physical Exam   BP: (!) 138/92  Pulse: 111  Temp: 98.2  F (36.8  C)  Resp: 16  Height: 162.6 cm (5' 4\")  Weight: 77.1 kg (170 lb)  SpO2: 92 %      Physical Exam  General: Afebrile, no acute distress   HEENT: Normocephalic, atraumatic, conjunctivae normal. MMM  Neck: non-tender, supple  Cardio: regular rate. regular rhythm   Resp: Normal work of breathing, no respiratory distress, lungs clear bilaterally, no wheezing, rhonchi, " rales  Chest/Back: no visual signs of trauma, no CVA tenderness   Abdomen: soft, non distension, no tenderness, no peritoneal signs   Neuro: alert and fully oriented. CN II-XII grossly intact. Grossly normal strength and sensation in all extremities.   MSK: no deformities. Normal range of motion  Integumentary/Skin: no rash visualized, normal color  Psych: normal affect, normal behavior    ED Course        Procedures         Results for orders placed or performed during the hospital encounter of 09/07/21 (from the past 24 hour(s))   CBC with platelets differential    Narrative    The following orders were created for panel order CBC with platelets differential.  Procedure                               Abnormality         Status                     ---------                               -----------         ------                     CBC with platelets and d...[[id number]]  Abnormal            Final result               Manual Differential[[id number]]          Abnormal            Final result                 Please view results for these tests on the individual orders.   Comprehensive metabolic panel   Result Value Ref Range    Sodium 138 133 - 144 mmol/L    Potassium 3.6 3.4 - 5.3 mmol/L    Chloride 110 (H) 94 - 109 mmol/L    Carbon Dioxide (CO2) 22 20 - 32 mmol/L    Anion Gap 6 3 - 14 mmol/L    Urea Nitrogen 12 7 - 30 mg/dL    Creatinine 0.71 0.52 - 1.04 mg/dL    Calcium 8.4 (L) 8.5 - 10.1 mg/dL    Glucose 88 70 - 99 mg/dL    Alkaline Phosphatase 73 40 - 150 U/L    AST 81 (H) 0 - 45 U/L    ALT 54 (H) 0 - 50 U/L    Protein Total 7.9 6.8 - 8.8 g/dL    Albumin 3.8 3.4 - 5.0 g/dL    Bilirubin Total 3.4 (H) 0.2 - 1.3 mg/dL    GFR Estimate >90 >60 mL/min/1.73m2   Reticulocyte count   Result Value Ref Range    % Reticulocyte 26.2 (H) 0.5 - 2.0 %    Absolute Reticulocyte 0.604 (H) 0.025 - 0.095 10e6/uL   CBC with platelets and differential   Result Value Ref Range    WBC Count 15.6 (H) 4.0 - 11.0 10e3/uL    RBC Count 2.30  (L) 3.80 - 5.20 10e6/uL    Hemoglobin 7.7 (L) 11.7 - 15.7 g/dL    Hematocrit 21.1 (L) 35.0 - 47.0 %    MCV 92 78 - 100 fL    MCH 33.5 (H) 26.5 - 33.0 pg    MCHC 36.5 31.5 - 36.5 g/dL    RDW 25.4 (H) 10.0 - 15.0 %    Platelet Count 308 150 - 450 10e3/uL   Manual Differential   Result Value Ref Range    % Neutrophils 51 %    % Lymphocytes 35 %    % Monocytes 6 %    % Eosinophils 4 %    % Basophils 4 %    NRBCs per 100 WBC 17 (H) <=0 %    Absolute Neutrophils 8.0 1.6 - 8.3 10e3/uL    Absolute Lymphocytes 5.5 (H) 0.8 - 5.3 10e3/uL    Absolute Monocytes 0.9 0.0 - 1.3 10e3/uL    Absolute Eosinophils 0.6 0.0 - 0.7 10e3/uL    Absolute Basophils 0.6 (H) 0.0 - 0.2 10e3/uL    Absolute NRBCs 2.7 (H) <=0.0 10e3/uL    RBC Morphology Confirmed RBC Indices     Platelet Assessment  Automated Count Confirmed. Platelet morphology is normal.     Automated Count Confirmed. Platelet morphology is normal.    Sickle Cells Marked (A) None Seen    Target Cells Moderate (A) None Seen     *Note: Due to a large number of results and/or encounters for the requested time period, some results have not been displayed. A complete set of results can be found in Results Review.     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  Upon arrival patient is well-appearing, afebrile, no distress.  Patient denies any fever, chest pain, cough, shortness of breath, no acute signs or infectious symptoms.  Suspect patient pain related to her sickle cell pain crises.  At this time will treat per pain management care protocol  with IV morphine, repeat comprehensive labs, and reevaluate.    I reviewed comprehensive labs which are remarkable for white blood cell count of 15.6, hemoglobin 7.7, no acute metabolic or electrolyte abnormality, mild transaminitis, reticulocyte count of 26.2.  Laboratory testing are similar to patient's baseline.  On reevaluation after 3 doses of pain medication patient states she is feeling better and is requesting to be discharged home.    I personally have seen patient 3 times in the past few days with similar presentations, overall patient is nontoxic-appearing, afebrile, no respiratory distress, no chest pain, no cough.  Patient recently had ultrasound of her bilateral lower extremities which is negative for DVT along with an echocardiogram.  At this time strongly encourage patient to follow-up with her outpatient providers for continued pain management and ongoing evaluation.  Patient understands and agrees with plan however return precautions discussed if high fever, chest pain, shortness of breath, cough, any worsening symptoms.    I have reviewed the nursing notes.    I have reviewed the findings, diagnosis, plan and need for follow up with the patient.    New Prescriptions    No medications on file       Final diagnoses:   Sickle cell pain crisis (H)         9/6/2021   [facility] EMERGENCY DEPARTMENT     Jamee Martin MD  09/07/21 [id number]

## 2021-09-08 ENCOUNTER — HOME INFUSION (PRE-WILLOW HOME INFUSION) (OUTPATIENT)
Dept: PHARMACY | Facility: CLINIC | Age: 21
End: 2021-09-08

## 2021-09-08 ENCOUNTER — NURSE TRIAGE (OUTPATIENT)
Dept: NURSING | Facility: CLINIC | Age: 21
End: 2021-09-08

## 2021-09-08 NOTE — PROGRESS NOTES
This is a recent snapshot of the patient's [facility] medical record.  For current drug dose and complete information and questions, call [phone] or In Basket pool, fv home infusion ([id number])  CSN Number:  [id number]

## 2021-09-09 ENCOUNTER — HOSPITAL ENCOUNTER (EMERGENCY)
Facility: CLINIC | Age: 21
Discharge: HOME | End: 2021-09-09
Attending: EMERGENCY MEDICINE | Admitting: EMERGENCY MEDICINE
Payer: COMMERCIAL

## 2021-09-09 VITALS
DIASTOLIC BLOOD PRESSURE: 81 MMHG | SYSTOLIC BLOOD PRESSURE: 131 MMHG | HEART RATE: 105 BPM | TEMPERATURE: 98.3 F | RESPIRATION RATE: 14 BRPM | OXYGEN SATURATION: 99 %

## 2021-09-09 DIAGNOSIS — D57.00 SICKLE CELL PAIN CRISIS (H): ICD-10-CM

## 2021-09-09 LAB
ALBUMIN SERPL-MCNC: 3.8 G/DL (ref 3.4–5)
ALP SERPL-CCNC: 71 U/L (ref 40–150)
ALT SERPL W P-5'-P-CCNC: 54 U/L (ref 0–50)
ANION GAP SERPL CALCULATED.3IONS-SCNC: 7 MMOL/L (ref 3–14)
AST SERPL W P-5'-P-CCNC: 78 U/L (ref 0–45)
BASOPHILS # BLD MANUAL: 0.3 10E3/UL (ref 0–0.2)
BASOPHILS NFR BLD MANUAL: 2 %
BILIRUB SERPL-MCNC: 3.5 MG/DL (ref 0.2–1.3)
BUN SERPL-MCNC: 11 MG/DL (ref 7–30)
CALCIUM SERPL-MCNC: 8.4 MG/DL (ref 8.5–10.1)
CHLORIDE BLD-SCNC: 111 MMOL/L (ref 94–109)
CO2 SERPL-SCNC: 22 MMOL/L (ref 20–32)
CREAT SERPL-MCNC: 0.57 MG/DL (ref 0.52–1.04)
ELLIPTOCYTES BLD QL SMEAR: SLIGHT
EOSINOPHIL # BLD MANUAL: 0.5 10E3/UL (ref 0–0.7)
EOSINOPHIL NFR BLD MANUAL: 4 %
ERYTHROCYTE [DISTWIDTH] IN BLOOD BY AUTOMATED COUNT: 27 % (ref 10–15)
GFR SERPL CREATININE-BSD FRML MDRD: >90 ML/MIN/1.73M2
GLUCOSE BLD-MCNC: 89 MG/DL (ref 70–99)
HCT VFR BLD AUTO: 21.8 % (ref 35–47)
HGB BLD-MCNC: 7.8 G/DL (ref 11.7–15.7)
LYMPHOCYTES # BLD MANUAL: 3.6 10E3/UL (ref 0.8–5.3)
LYMPHOCYTES NFR BLD MANUAL: 27 %
MCH RBC QN AUTO: 33.5 PG (ref 26.5–33)
MCHC RBC AUTO-ENTMCNC: 35.8 G/DL (ref 31.5–36.5)
MCV RBC AUTO: 94 FL (ref 78–100)
MONOCYTES # BLD MANUAL: 0.5 10E3/UL (ref 0–1.3)
MONOCYTES NFR BLD MANUAL: 4 %
NEUTROPHILS # BLD MANUAL: 8.3 10E3/UL (ref 1.6–8.3)
NEUTROPHILS NFR BLD MANUAL: 63 %
NRBC # BLD AUTO: 2 10E3/UL
NRBC BLD MANUAL-RTO: 15 %
PLAT MORPH BLD: ABNORMAL
PLATELET # BLD AUTO: 285 10E3/UL (ref 150–450)
POTASSIUM BLD-SCNC: 3.6 MMOL/L (ref 3.4–5.3)
PROT SERPL-MCNC: 7.8 G/DL (ref 6.8–8.8)
RBC # BLD AUTO: 2.33 10E6/UL (ref 3.8–5.2)
RBC MORPH BLD: ABNORMAL
RETICS # AUTO: 0.66 10E6/UL (ref 0.03–0.1)
RETICS/RBC NFR AUTO: 28.3 % (ref 0.5–2)
SICKLE CELLS BLD QL SMEAR: ABNORMAL
SODIUM SERPL-SCNC: 140 MMOL/L (ref 133–144)
TARGETS BLD QL SMEAR: ABNORMAL
WBC # BLD AUTO: 13.2 10E3/UL (ref 4–11)

## 2021-09-09 PROCEDURE — 85045 AUTOMATED RETICULOCYTE COUNT: CPT | Performed by: EMERGENCY MEDICINE

## 2021-09-09 PROCEDURE — 99285 EMERGENCY DEPT VISIT HI MDM: CPT | Mod: 25 | Performed by: EMERGENCY MEDICINE

## 2021-09-09 PROCEDURE — 36415 COLL VENOUS BLD VENIPUNCTURE: CPT | Performed by: EMERGENCY MEDICINE

## 2021-09-09 PROCEDURE — 258N000003 HC RX IP 258 OP 636: Performed by: EMERGENCY MEDICINE

## 2021-09-09 PROCEDURE — 99285 EMERGENCY DEPT VISIT HI MDM: CPT | Performed by: EMERGENCY MEDICINE

## 2021-09-09 PROCEDURE — 82374 ASSAY BLOOD CARBON DIOXIDE: CPT | Performed by: EMERGENCY MEDICINE

## 2021-09-09 PROCEDURE — 96361 HYDRATE IV INFUSION ADD-ON: CPT | Performed by: EMERGENCY MEDICINE

## 2021-09-09 PROCEDURE — 96376 TX/PRO/DX INJ SAME DRUG ADON: CPT | Performed by: EMERGENCY MEDICINE

## 2021-09-09 PROCEDURE — 250N000011 HC RX IP 250 OP 636: Performed by: EMERGENCY MEDICINE

## 2021-09-09 PROCEDURE — 96374 THER/PROPH/DIAG INJ IV PUSH: CPT | Performed by: EMERGENCY MEDICINE

## 2021-09-09 PROCEDURE — 85027 COMPLETE CBC AUTOMATED: CPT | Performed by: EMERGENCY MEDICINE

## 2021-09-09 RX ORDER — OXYCODONE HYDROCHLORIDE 15 MG/1
TABLET ORAL
Qty: 60 TABLET | Refills: 0 | Status: SHIPPED | OUTPATIENT
Start: 2021-09-09 | End: 2021-09-17

## 2021-09-09 RX ORDER — HEPARIN SODIUM (PORCINE) LOCK FLUSH IV SOLN 100 UNIT/ML 100 UNIT/ML
5-10 SOLUTION INTRAVENOUS
Status: DISCONTINUED | OUTPATIENT
Start: 2021-09-09 | End: 2021-09-09 | Stop reason: HOSPADM

## 2021-09-09 RX ORDER — MORPHINE SULFATE 2 MG/ML
2 INJECTION, SOLUTION INTRAMUSCULAR; INTRAVENOUS
Status: COMPLETED | OUTPATIENT
Start: 2021-09-09 | End: 2021-09-09

## 2021-09-09 RX ORDER — SODIUM CHLORIDE, SODIUM LACTATE, POTASSIUM CHLORIDE, CALCIUM CHLORIDE 600; 310; 30; 20 MG/100ML; MG/100ML; MG/100ML; MG/100ML
INJECTION, SOLUTION INTRAVENOUS CONTINUOUS
Status: DISCONTINUED | OUTPATIENT
Start: 2021-09-09 | End: 2021-09-09 | Stop reason: HOSPADM

## 2021-09-09 RX ADMIN — MORPHINE SULFATE 2 MG: 2 INJECTION, SOLUTION INTRAMUSCULAR; INTRAVENOUS at 05:52

## 2021-09-09 RX ADMIN — MORPHINE SULFATE 2 MG: 2 INJECTION, SOLUTION INTRAMUSCULAR; INTRAVENOUS at 04:27

## 2021-09-09 RX ADMIN — HEPARIN 5 ML: 100 SYRINGE at 07:43

## 2021-09-09 RX ADMIN — MORPHINE SULFATE 2 MG: 2 INJECTION, SOLUTION INTRAMUSCULAR; INTRAVENOUS at 02:05

## 2021-09-09 RX ADMIN — SODIUM CHLORIDE, POTASSIUM CHLORIDE, SODIUM LACTATE AND CALCIUM CHLORIDE: 600; 310; 30; 20 INJECTION, SOLUTION INTRAVENOUS at 02:05

## 2021-09-09 ASSESSMENT — ENCOUNTER SYMPTOMS
CHILLS: 0
FEVER: 0
EYE PAIN: 0
NECK PAIN: 0
NAUSEA: 0
CONFUSION: 0
VOMITING: 0
COUGH: 0
FATIGUE: 0
DYSURIA: 0
DIARRHEA: 0
COLOR CHANGE: 0
ABDOMINAL PAIN: 1
BACK PAIN: 1
MYALGIAS: 0
DIFFICULTY URINATING: 0
HEADACHES: 0
DIZZINESS: 0
SHORTNESS OF BREATH: 0
ARTHRALGIAS: 0
ABDOMINAL DISTENTION: 0
WEAKNESS: 0
SORE THROAT: 0
CHEST TIGHTNESS: 0
FREQUENCY: 0
CONSTIPATION: 0
PALPITATIONS: 0

## 2021-09-09 NOTE — PROGRESS NOTES
This is a recent snapshot of the patient's [facility] medical record.  For current drug dose and complete information and questions, call [phone] or In [facility] pool, fv home infusion ([id number])  CSN Number:  [id number]

## 2021-09-09 NOTE — TELEPHONE ENCOUNTER
S: Medication Refill requested for Oxycodone  Home Infusion concern     B: Two Concerns:  1) Oxycodone Refill Needed  Date of most recent appointment:  8/24/21  Next upcoming appointment:   9/20/21  Quantity:  60  Last fill date:  8/30/21  Person requesting refill:  Jennifer  Notes:  Pt just was discharged from ED. Would like a refill on oxycodone because she will likely be out of meds by Monday, 9/13. Tries to take med every 6 hours, but when pain flares up, then she takes it every 4 hours. Pain is well controlled right now. Not sure how many pills she has left--just doesn't want to run out so she can manage pain at home and stay out of ED.  Prescribing provider(s):  Perla    2) Home infusions of [facility]  Pt states that she has started receiving bills. Has not had bills in the past. If insurance does not cover [place] infusions, then she will be unable to continue at home. Would like to speak with Dr. Duncan or Amanda for assistance about resolving.     A: Informed pt that I would pend up med for Dr. Duncan to sign and will route to provider and care team to address other concerns. Pt is in agreement with this plan.    R: Medication pended and routed to provider and care team.

## 2021-09-09 NOTE — ED PROVIDER NOTES
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  She notes that it started approximately 10 PM tonight.  Has been progressive worsening.  Mainly involves her left upper abdomen and radiating into her back.  Denies any chest pain or shortness of breath.  States she did have some chest pain earlier but that has since resolved.  Patient noted to have 4 ED visits for same symptoms this week.  Currently denies any dyspnea, fever/chills, lower abdominal pain, nausea/vomiting, sick contacts, has no other associated symptoms.    Past Medical History  Past Medical History:   Diagnosis Date     Anxiety      Bleeding disorder (H)      Blood clotting disorder (H)      Cerebral infarction (H) 2015     Cognitive developmental delay     low IQ. 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 (H)     right hand contractures     Iron overload due to repeated red blood cell transfusions      Migraines      Multiple subsegmental pulmonary emboli without acute cor pulmonale (H) 02/01/2021     Oppositional defiant behavior      Superficial venous thrombosis of arm, right 03/25/2021     Uncomplicated asthma      Past Surgical History:   Procedure Laterality Date     AS INSERT TUNNELED CV 2 CATH W/O PORT/PUMP       C BREAST REDUCTION (INCLUDES LIPO) TIER 3 Bilateral 04/23/2019     CHOLECYSTECTOMY       INSERT PORT VASCULAR ACCESS Left 4/21/2021    Procedure: INSERTION, VASCULAR ACCESS PORT (NOT SURE ON SIDE UNTIL REMOVAL);  Surgeon: Derik  MD Rajan;  Location: [facility] OR     IR CHEST PORT PLACEMENT > 5 YRS OF AGE  4/21/2021     IR CVC NON TUNNEL LINE REMOVAL  5/6/2021     IR CVC NON TUNNEL PLACEMENT  04/07/2020     IR CVC NON TUNNEL PLACEMENT  4/30/2021     IR PORT REMOVAL LEFT  4/21/2021     REMOVE PORT VASCULAR ACCESS Left 4/21/2021    Procedure: REMOVAL, VASCULAR ACCESS PORT LEFT;  Surgeon: Rajan More MD;  Location: [facility] OR     REPAIR TENDON ELBOW Right 10/02/2019    Procedure: Right Elbow Flexor Lengthening, Flexor Pronator Slide Of Wrist and Finger, Thumb Adductor Lengthening;  Surgeon: Anai Franco MD;  [facility]     TONSILLECTOMY Bilateral 10/02/2019    Procedure: Bilateral Tonsillectomy;  Surgeon: Farhana Guy MD;  [facility]: [facility]     acetaminophen (TYLENOL) 325 MG tablet  albuterol (PROAIR HFA/PROVENTIL HFA/VENTOLIN HFA) 108 (90 Base) MCG/ACT inhaler  albuterol (PROVENTIL) (2.5 MG/3ML) 0.083% neb solution  ARIPiprazole (ABILIFY) 2 MG tablet  aspirin (ASA) 81 MG chewable tablet  budesonide-formoterol (SYMBICORT) 160-4.5 MCG/ACT Inhaler  dabigatran ANTICOAGULANT (PRADAXA) 150 MG capsule  diclofenac (VOLTAREN) 1 % topical gel  diphenhydrAMINE (BENADRYL) 25 MG capsule  DULoxetine (CYMBALTA) 30 MG capsule  EPINEPHrine (ANY BX GENERIC EQUIV) 0.3 MG/0.3ML injection 2-pack  Hydroxyurea 1000 MG TABS  hydrOXYzine (ATARAX) 25 MG tablet  JADENU 360 MG tablet  lidocaine-prilocaine (EMLA) 2.5-2.5 % external cream  medroxyPROGESTERone (DEPO-PROVERA) 150 MG/ML IM injection  naloxone (NARCAN) 4 MG/0.1ML nasal spray  omeprazole (PRILOSEC) 20 MG DR capsule  ondansetron (ZOFRAN) 8 MG tablet  oxyCODONE (OXYCONTIN) 10 MG 12 hr tablet  oxyCODONE IR (ROXICODONE) 15 MG tablet      Allergies   Allergen Reactions     Contrast Dye      Hives and breathing issues     Fish-Derived Products Hives     Seafood Hives     Diagnostic X-Ray Materials      Gadolinium      Family History  Family History   Problem Relation Age of Onset     Sickle  Cell Trait Mother      Hypertension Mother      Asthma Mother      Sickle Cell Trait Father      Social History   Social History     Tobacco Use     Smoking status: Never Smoker     Smokeless tobacco: Never Used   Substance Use Topics     Alcohol use: Not Currently     Alcohol/week: 0.0 standard drinks     Drug use: Never      Past medical history, past surgical history, medications, allergies, family history, and social history were reviewed with the patient. No additional pertinent items.       Review of Systems   Constitutional: Negative for chills, fatigue and fever.   HENT: Negative for congestion and sore throat.    Eyes: Negative for pain and visual disturbance.   Respiratory: Negative for cough, chest tightness and shortness of breath.    Cardiovascular: Negative for chest pain and palpitations.   Gastrointestinal: Positive for abdominal pain. Negative for abdominal distention, constipation, diarrhea, nausea and vomiting.   Genitourinary: Negative for difficulty urinating, dysuria, frequency and urgency.   Musculoskeletal: Positive for back pain. Negative for arthralgias, myalgias and neck pain.   Skin: Negative for color change and rash.   Neurological: Negative for dizziness, weakness and headaches.   Psychiatric/Behavioral: Negative for confusion.     A complete review of systems was performed with pertinent positives and negatives noted in the HPI, and all other systems negative.    Physical Exam   BP: 137/87  Pulse: 104  Temp: 98.3  F (36.8  C)  Resp: 14  SpO2: 93 %  Physical Exam  Vitals and nursing note reviewed.   Constitutional:       General: She is not in acute distress.     Appearance: Normal appearance. She is not ill-appearing or toxic-appearing.   HENT:      Head: Normocephalic and atraumatic.      Nose: Nose normal.      Mouth/Throat:      Mouth: Mucous membranes are moist.   Eyes:      Pupils: Pupils are equal, round, and reactive to light.   Cardiovascular:      Rate and Rhythm: Normal  rate.      Pulses: Normal pulses.      Heart sounds: Normal heart sounds.   Pulmonary:      Effort: Pulmonary effort is normal. No respiratory distress.      Breath sounds: Normal breath sounds.   Abdominal:      General: Abdomen is flat. There is no distension.   Musculoskeletal:         General: No swelling or deformity. Normal range of motion.      Cervical back: Normal range of motion. No rigidity.      Comments: No tenderness of the thoracic spine in the area of  reported pain   Skin:     General: Skin is warm.      Capillary Refill: Capillary refill takes less than 2 seconds.   Neurological:      Mental Status: She is alert and oriented to person, place, and time.   Psychiatric:         Mood and Affect: Mood normal.         ED Course      Procedures       The medical record was reviewed and interpreted.  Current labs reviewed and interpreted.  Previous labs reviewed and interpreted.  Previous images reviewed and interpreted: no acute chest.       Results for orders placed or performed during the hospital encounter of 09/09/21   CBC with platelets differential     Status: Abnormal    Narrative    The following orders were created for panel order CBC with platelets differential.  Procedure                               Abnormality         Status                     ---------                               -----------         ------                     CBC with platelets and d...[[id number]]  Abnormal            Final result               Manual Differential[[id number]]          Abnormal            Final result                 Please view results for these tests on the individual orders.   Comprehensive metabolic panel     Status: Abnormal   Result Value Ref Range    Sodium 140 133 - 144 mmol/L    Potassium 3.6 3.4 - 5.3 mmol/L    Chloride 111 (H) 94 - 109 mmol/L    Carbon Dioxide (CO2) 22 20 - 32 mmol/L    Anion Gap 7 3 - 14 mmol/L    Urea Nitrogen 11 7 - 30 mg/dL    Creatinine 0.57 0.52 - 1.04 mg/dL    Calcium 8.4  (L) 8.5 - 10.1 mg/dL    Glucose 89 70 - 99 mg/dL    Alkaline Phosphatase 71 40 - 150 U/L    AST 78 (H) 0 - 45 U/L    ALT 54 (H) 0 - 50 U/L    Protein Total 7.8 6.8 - 8.8 g/dL    Albumin 3.8 3.4 - 5.0 g/dL    Bilirubin Total 3.5 (H) 0.2 - 1.3 mg/dL    GFR Estimate >90 >60 mL/min/1.73m2   Reticulocyte count     Status: Abnormal   Result Value Ref Range    % Reticulocyte 28.3 (H) 0.5 - 2.0 %    Absolute Reticulocyte 0.660 (H) 0.025 - 0.095 10e6/uL   CBC with platelets and differential     Status: Abnormal   Result Value Ref Range    WBC Count 13.2 (H) 4.0 - 11.0 10e3/uL    RBC Count 2.33 (L) 3.80 - 5.20 10e6/uL    Hemoglobin 7.8 (L) 11.7 - 15.7 g/dL    Hematocrit 21.8 (L) 35.0 - 47.0 %    MCV 94 78 - 100 fL    MCH 33.5 (H) 26.5 - 33.0 pg    MCHC 35.8 31.5 - 36.5 g/dL    RDW 27.0 (H) 10.0 - 15.0 %    Platelet Count 285 150 - 450 10e3/uL   Manual Differential     Status: Abnormal   Result Value Ref Range    % Neutrophils 63 %    % Lymphocytes 27 %    % Monocytes 4 %    % Eosinophils 4 %    % Basophils 2 %    NRBCs per 100 WBC 15 (H) <=0 %    Absolute Neutrophils 8.3 1.6 - 8.3 10e3/uL    Absolute Lymphocytes 3.6 0.8 - 5.3 10e3/uL    Absolute Monocytes 0.5 0.0 - 1.3 10e3/uL    Absolute Eosinophils 0.5 0.0 - 0.7 10e3/uL    Absolute Basophils 0.3 (H) 0.0 - 0.2 10e3/uL    Absolute NRBCs 2.0 (H) <=0.0 10e3/uL    RBC Morphology Confirmed RBC Indices     Platelet Assessment  Automated Count Confirmed. Platelet morphology is normal.     Automated Count Confirmed. Platelet morphology is normal.    Elliptocytes Slight (A) None Seen    Sickle Cells Marked (A) None Seen    Target Cells Moderate (A) None Seen     Medications   morphine (PF) injection 2 mg (2 mg Intravenous Given 9/9/21 [id number])        Assessments & Plan (with Medical Decision Making)   Patient presents for evaluation of left-sided body pain in setting of known sickle cell disease.  States it feels similar to his sickle cell previously.  Had reported chest pain earlier in  the night but that has since resolved by time of my examination.  She currently denies any chest pain or shortness of breath.  Very low suspicion for acute chest syndrome.  Patient has had multiple EKGs and chest x-rays this week without any significant findings.  Patient does have a PE and is currently on Pradaxa.  Low suspicion for worsening clot burden given normal hemodynamics and saturating in mid to high 90s on room air.  Patient agrees with this assessment.  She was given her usual medications per her care plan and on reassessment, she states that she feels much better.  We will continue to monitor clinically.  If she develops any chest pain or shortness of breath, we would proceed with EKG, chest x-ray, possible PE work-up.    On reassessment, patient feels much better.  Requesting discharge.  Will follow up with PCP/hematology and return with new or worsening issues.        I have reviewed the nursing notes. I have reviewed the findings, diagnosis, plan and need for follow up with the patient.    Discharge Medication List as of 9/9/2021  7:35 AM          Final diagnoses:   Sickle cell pain crisis (H)       --  Raul Diaz DO  [facility] EMERGENCY DEPARTMENT  9/9/2021     Raul Diaz DO  09/09/21 [id number]

## 2021-09-09 NOTE — TELEPHONE ENCOUNTER
"Pt reports she continues to have sickle cell pain. Was in the ER yesterday and advised to f/u with outpatient providers. Pt is wondering what else she can do for pain.    Reviewed pt's pain medications with her to see if pt is taking as prescribed, pt states she is. Advised pt message will be sent to clinic to f/u with her however if pain is not manageable she will need to return to the ER.     Pt ended phone call with Writer stating she did not wish to speak to Writer anymore as Writer was \"rude and no help\".     Reason for Disposition    Caller hangs up    Protocols used: DIFFICULT CALLER-A-AH      "

## 2021-09-09 NOTE — DISCHARGE INSTRUCTIONS
Please make an appointment to follow up with Your Primary Care Provider in 1-2 days if you have any concerns.  If your symptoms worsen or you develop a fever 100.4  F or higher please come back to the emergency department.

## 2021-09-11 ENCOUNTER — HOSPITAL ENCOUNTER (EMERGENCY)
Facility: CLINIC | Age: 21
Discharge: HOME | End: 2021-09-11
Payer: COMMERCIAL

## 2021-09-11 VITALS
BODY MASS INDEX: 29.02 KG/M2 | WEIGHT: 170 LBS | TEMPERATURE: 98.9 F | SYSTOLIC BLOOD PRESSURE: 136 MMHG | RESPIRATION RATE: 16 BRPM | HEART RATE: 119 BPM | OXYGEN SATURATION: 89 % | HEIGHT: 64 IN | DIASTOLIC BLOOD PRESSURE: 89 MMHG

## 2021-09-11 ASSESSMENT — MIFFLIN-ST. JEOR: SCORE: 1516.11

## 2021-09-11 NOTE — ED TRIAGE NOTES
Pt presents to the ER with Sickle Cell Pain. Pt states that its been going on for 2 days. Pt has been trying at home medications with no relief. Pain is all over the body.

## 2021-09-12 ENCOUNTER — HOSPITAL ENCOUNTER (EMERGENCY)
Facility: CLINIC | Age: 21
Discharge: HOME | End: 2021-09-12
Attending: EMERGENCY MEDICINE | Admitting: EMERGENCY MEDICINE
Payer: COMMERCIAL

## 2021-09-12 ENCOUNTER — APPOINTMENT (OUTPATIENT)
Dept: GENERAL RADIOLOGY | Facility: CLINIC | Age: 21
End: 2021-09-12
Attending: EMERGENCY MEDICINE
Payer: COMMERCIAL

## 2021-09-12 VITALS
SYSTOLIC BLOOD PRESSURE: 107 MMHG | RESPIRATION RATE: 20 BRPM | OXYGEN SATURATION: 90 % | WEIGHT: 170 LBS | BODY MASS INDEX: 29.02 KG/M2 | DIASTOLIC BLOOD PRESSURE: 49 MMHG | HEIGHT: 64 IN | TEMPERATURE: 98.6 F | HEART RATE: 102 BPM

## 2021-09-12 DIAGNOSIS — D57.00 SICKLE CELL PAIN CRISIS (H): ICD-10-CM

## 2021-09-12 LAB
ALBUMIN SERPL-MCNC: 3.7 G/DL (ref 3.4–5)
ALP SERPL-CCNC: 68 U/L (ref 40–150)
ALT SERPL W P-5'-P-CCNC: 67 U/L (ref 0–50)
ANION GAP SERPL CALCULATED.3IONS-SCNC: 6 MMOL/L (ref 3–14)
AST SERPL W P-5'-P-CCNC: 80 U/L (ref 0–45)
BASOPHILS # BLD AUTO: 0.2 10E3/UL (ref 0–0.2)
BASOPHILS # BLD AUTO: 0.2 10E3/UL (ref 0–0.2)
BASOPHILS NFR BLD AUTO: 1 %
BASOPHILS NFR BLD AUTO: 2 %
BILIRUB SERPL-MCNC: 3.6 MG/DL (ref 0.2–1.3)
BUN SERPL-MCNC: 12 MG/DL (ref 7–30)
CALCIUM SERPL-MCNC: 8.3 MG/DL (ref 8.5–10.1)
CHLORIDE BLD-SCNC: 108 MMOL/L (ref 94–109)
CO2 SERPL-SCNC: 22 MMOL/L (ref 20–32)
CREAT SERPL-MCNC: 0.6 MG/DL (ref 0.52–1.04)
EOSINOPHIL # BLD AUTO: 0.6 10E3/UL (ref 0–0.7)
EOSINOPHIL # BLD AUTO: 0.8 10E3/UL (ref 0–0.7)
EOSINOPHIL NFR BLD AUTO: 5 %
EOSINOPHIL NFR BLD AUTO: 6 %
ERYTHROCYTE [DISTWIDTH] IN BLOOD BY AUTOMATED COUNT: 22.8 % (ref 10–15)
ERYTHROCYTE [DISTWIDTH] IN BLOOD BY AUTOMATED COUNT: 23.7 % (ref 10–15)
GFR SERPL CREATININE-BSD FRML MDRD: >90 ML/MIN/1.73M2
GLUCOSE BLD-MCNC: 103 MG/DL (ref 70–99)
HCT VFR BLD AUTO: 16.1 % (ref 35–47)
HCT VFR BLD AUTO: 17.7 % (ref 35–47)
HGB BLD-MCNC: 5.7 G/DL (ref 11.7–15.7)
HGB BLD-MCNC: 6.4 G/DL (ref 11.7–15.7)
IMM GRANULOCYTES # BLD: 0.1 10E3/UL
IMM GRANULOCYTES # BLD: 0.1 10E3/UL
IMM GRANULOCYTES NFR BLD: 1 %
IMM GRANULOCYTES NFR BLD: 1 %
LYMPHOCYTES # BLD AUTO: 2.6 10E3/UL (ref 0.8–5.3)
LYMPHOCYTES # BLD AUTO: 3.2 10E3/UL (ref 0.8–5.3)
LYMPHOCYTES NFR BLD AUTO: 21 %
LYMPHOCYTES NFR BLD AUTO: 24 %
MCH RBC QN AUTO: 32.6 PG (ref 26.5–33)
MCH RBC QN AUTO: 32.7 PG (ref 26.5–33)
MCHC RBC AUTO-ENTMCNC: 35.4 G/DL (ref 31.5–36.5)
MCHC RBC AUTO-ENTMCNC: 36.2 G/DL (ref 31.5–36.5)
MCV RBC AUTO: 90 FL (ref 78–100)
MCV RBC AUTO: 92 FL (ref 78–100)
MONOCYTES # BLD AUTO: 1.1 10E3/UL (ref 0–1.3)
MONOCYTES # BLD AUTO: 1.2 10E3/UL (ref 0–1.3)
MONOCYTES NFR BLD AUTO: 10 %
MONOCYTES NFR BLD AUTO: 8 %
NEUTROPHILS # BLD AUTO: 7.3 10E3/UL (ref 1.6–8.3)
NEUTROPHILS # BLD AUTO: 8 10E3/UL (ref 1.6–8.3)
NEUTROPHILS NFR BLD AUTO: 59 %
NEUTROPHILS NFR BLD AUTO: 62 %
NRBC # BLD AUTO: 0.6 10E3/UL
NRBC # BLD AUTO: 0.7 10E3/UL
NRBC BLD AUTO-RTO: 5 /100
NRBC BLD AUTO-RTO: 5 /100
PLATELET # BLD AUTO: 203 10E3/UL (ref 150–450)
PLATELET # BLD AUTO: 254 10E3/UL (ref 150–450)
POTASSIUM BLD-SCNC: 3.7 MMOL/L (ref 3.4–5.3)
PROT SERPL-MCNC: 7.4 G/DL (ref 6.8–8.8)
RBC # BLD AUTO: 1.75 10E6/UL (ref 3.8–5.2)
RBC # BLD AUTO: 1.96 10E6/UL (ref 3.8–5.2)
RETICS # AUTO: 0.4 10E6/UL (ref 0.03–0.1)
RETICS/RBC NFR AUTO: 23.1 % (ref 0.5–2)
SODIUM SERPL-SCNC: 136 MMOL/L (ref 133–144)
WBC # BLD AUTO: 11.9 10E3/UL (ref 4–11)
WBC # BLD AUTO: 13.4 10E3/UL (ref 4–11)

## 2021-09-12 PROCEDURE — 96374 THER/PROPH/DIAG INJ IV PUSH: CPT | Performed by: EMERGENCY MEDICINE

## 2021-09-12 PROCEDURE — 71046 X-RAY EXAM CHEST 2 VIEWS: CPT | Mod: 26 | Performed by: RADIOLOGY

## 2021-09-12 PROCEDURE — 85025 COMPLETE CBC W/AUTO DIFF WBC: CPT | Mod: 91 | Performed by: EMERGENCY MEDICINE

## 2021-09-12 PROCEDURE — 96375 TX/PRO/DX INJ NEW DRUG ADDON: CPT | Performed by: EMERGENCY MEDICINE

## 2021-09-12 PROCEDURE — 96376 TX/PRO/DX INJ SAME DRUG ADON: CPT | Performed by: EMERGENCY MEDICINE

## 2021-09-12 PROCEDURE — 96361 HYDRATE IV INFUSION ADD-ON: CPT | Performed by: EMERGENCY MEDICINE

## 2021-09-12 PROCEDURE — 80053 COMPREHEN METABOLIC PANEL: CPT | Performed by: EMERGENCY MEDICINE

## 2021-09-12 PROCEDURE — 258N000003 HC RX IP 258 OP 636: Performed by: EMERGENCY MEDICINE

## 2021-09-12 PROCEDURE — 250N000011 HC RX IP 250 OP 636: Performed by: EMERGENCY MEDICINE

## 2021-09-12 PROCEDURE — 36415 COLL VENOUS BLD VENIPUNCTURE: CPT | Mod: 91 | Performed by: EMERGENCY MEDICINE

## 2021-09-12 PROCEDURE — 85045 AUTOMATED RETICULOCYTE COUNT: CPT | Performed by: EMERGENCY MEDICINE

## 2021-09-12 PROCEDURE — 99285 EMERGENCY DEPT VISIT HI MDM: CPT | Mod: 25 | Performed by: EMERGENCY MEDICINE

## 2021-09-12 PROCEDURE — 71046 X-RAY EXAM CHEST 2 VIEWS: CPT

## 2021-09-12 PROCEDURE — 99285 EMERGENCY DEPT VISIT HI MDM: CPT | Performed by: EMERGENCY MEDICINE

## 2021-09-12 RX ORDER — HEPARIN SODIUM (PORCINE) LOCK FLUSH IV SOLN 100 UNIT/ML 100 UNIT/ML
5000 SOLUTION INTRAVENOUS ONCE
Status: DISCONTINUED | OUTPATIENT
Start: 2021-09-12 | End: 2021-09-12 | Stop reason: CLARIF

## 2021-09-12 RX ORDER — KETOROLAC TROMETHAMINE 15 MG/ML
15 INJECTION, SOLUTION INTRAMUSCULAR; INTRAVENOUS ONCE
Status: COMPLETED | OUTPATIENT
Start: 2021-09-12 | End: 2021-09-12

## 2021-09-12 RX ORDER — SODIUM CHLORIDE 9 MG/ML
INJECTION, SOLUTION INTRAVENOUS CONTINUOUS
Status: DISCONTINUED | OUTPATIENT
Start: 2021-09-12 | End: 2021-09-12 | Stop reason: HOSPADM

## 2021-09-12 RX ORDER — MORPHINE SULFATE 4 MG/ML
2 INJECTION, SOLUTION INTRAMUSCULAR; INTRAVENOUS
Status: COMPLETED | OUTPATIENT
Start: 2021-09-12 | End: 2021-09-12

## 2021-09-12 RX ORDER — HEPARIN SODIUM (PORCINE) LOCK FLUSH IV SOLN 100 UNIT/ML 100 UNIT/ML
5 SOLUTION INTRAVENOUS
Status: DISCONTINUED | OUTPATIENT
Start: 2021-09-12 | End: 2021-09-12 | Stop reason: HOSPADM

## 2021-09-12 RX ADMIN — MORPHINE SULFATE 2 MG: 4 INJECTION INTRAVENOUS at 03:24

## 2021-09-12 RX ADMIN — MORPHINE SULFATE 2 MG: 4 INJECTION INTRAVENOUS at 02:03

## 2021-09-12 RX ADMIN — SODIUM CHLORIDE, PRESERVATIVE FREE 5 ML: 5 INJECTION INTRAVENOUS at 05:40

## 2021-09-12 RX ADMIN — MORPHINE SULFATE 2 MG: 4 INJECTION INTRAVENOUS at 00:54

## 2021-09-12 RX ADMIN — SODIUM CHLORIDE 1000 ML: 9 INJECTION, SOLUTION INTRAVENOUS at 00:53

## 2021-09-12 RX ADMIN — KETOROLAC TROMETHAMINE 15 MG: 15 INJECTION, SOLUTION INTRAMUSCULAR; INTRAVENOUS at 00:54

## 2021-09-12 ASSESSMENT — MIFFLIN-ST. JEOR: SCORE: 1516.11

## 2021-09-12 NOTE — DISCHARGE INSTRUCTIONS
Please follow-up with hematology as soon as you can    Return to the emergency department if you have any further concerns

## 2021-09-12 NOTE — ED PROVIDER NOTES
ED Provider Note  [facility]      History     Chief Complaint   Patient presents with     Sickle Cell Pain Crisis     The history is provided by the patient.     Jennifer Cervantes is a 21 year old female who has a medical medical history of sickle cell disease presents to the ED with sickle cell crisis.  Patient endorses experiencing pain all over.  Patient denies recent fevers, vomiting, diarrhea, nausea, chest pain, abdominal pain, hip pain, or falling.  Patient reports normal bowel movements and urination.  Patient reports taking routine medications.  Patient reports being unable to get admitted to infusion clinic.     Past Medical History  Past Medical History:   Diagnosis Date     Anxiety      Bleeding disorder (H)      Blood clotting disorder (H)      Cerebral infarction (H) 2015     Cognitive developmental delay     low IQ. 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 (H)     right hand contractures     Iron overload due to repeated red blood cell transfusions      Migraines      Multiple subsegmental pulmonary emboli without acute cor pulmonale (H) 02/01/2021     Oppositional defiant behavior      Superficial venous thrombosis of arm, right 03/25/2021     Uncomplicated asthma      Past Surgical History:   Procedure Laterality Date     AS INSERT TUNNELED CV 2 CATH W/O PORT/PUMP       C BREAST REDUCTION (INCLUDES LIPO) TIER 3 Bilateral 04/23/2019     CHOLECYSTECTOMY       INSERT PORT VASCULAR ACCESS Left 4/21/2021    Procedure: INSERTION, VASCULAR ACCESS PORT (NOT SURE ON SIDE UNTIL REMOVAL);  Surgeon: Rajan More MD;  Location: [facility] OR     IR CHEST PORT PLACEMENT > 5 YRS OF AGE  4/21/2021     IR CVC NON TUNNEL LINE REMOVAL  5/6/2021     IR CVC NON TUNNEL PLACEMENT  04/07/2020     IR CVC NON TUNNEL PLACEMENT  4/30/2021     IR PORT REMOVAL LEFT  4/21/2021      REMOVE PORT VASCULAR ACCESS Left 4/21/2021    Procedure: REMOVAL, VASCULAR ACCESS PORT LEFT;  Surgeon: Rajan More MD;  Location: [facility] OR     REPAIR TENDON ELBOW Right 10/02/2019    Procedure: Right Elbow Flexor Lengthening, Flexor Pronator Slide Of Wrist and Finger, Thumb Adductor Lengthening;  Surgeon: Anai Farnco MD;  [facility]: [facility]     TONSILLECTOMY Bilateral 10/02/2019    Procedure: Bilateral Tonsillectomy;  Surgeon: Farhana Guy MD;  Location: [facility] OR     acetaminophen (TYLENOL) 325 MG tablet  albuterol (PROAIR HFA/PROVENTIL HFA/VENTOLIN HFA) 108 (90 Base) MCG/ACT inhaler  albuterol (PROVENTIL) (2.5 MG/3ML) 0.083% neb solution  ARIPiprazole (ABILIFY) 2 MG tablet  aspirin (ASA) 81 MG chewable tablet  budesonide-formoterol (SYMBICORT) 160-4.5 MCG/ACT Inhaler  dabigatran ANTICOAGULANT (PRADAXA) 150 MG capsule  diclofenac (VOLTAREN) 1 % topical gel  diphenhydrAMINE (BENADRYL) 25 MG capsule  DULoxetine (CYMBALTA) 30 MG capsule  EPINEPHrine (ANY BX GENERIC EQUIV) 0.3 MG/0.3ML injection 2-pack  Hydroxyurea 1000 MG TABS  hydrOXYzine (ATARAX) 25 MG tablet  JADENU 360 MG tablet  lidocaine-prilocaine (EMLA) 2.5-2.5 % external cream  medroxyPROGESTERone (DEPO-PROVERA) 150 MG/ML IM injection  naloxone (NARCAN) 4 MG/0.1ML nasal spray  omeprazole (PRILOSEC) 20 MG DR capsule  ondansetron (ZOFRAN) 8 MG tablet  oxyCODONE (OXYCONTIN) 10 MG 12 hr tablet  oxyCODONE IR (ROXICODONE) 15 MG tablet      Allergies   Allergen Reactions     Contrast Dye      Hives and breathing issues     Fish-Derived Products Hives     Seafood Hives     Diagnostic X-Ray Materials      Gadolinium      Family History  Family History   Problem Relation Age of Onset     Sickle Cell Trait Mother      Hypertension Mother      Asthma Mother      Sickle Cell Trait Father      Social History   Social History     Tobacco Use     Smoking status: Never Smoker     Smokeless tobacco: Never Used   Substance Use Topics     Alcohol use: Not  "Currently     Alcohol/week: 0.0 standard drinks     Drug use: Never      Past medical history, past surgical history, medications, allergies, family history, and social history were reviewed with the patient. No additional pertinent items.       Review of Systems   ROS: 14 point ROS neg other than the symptoms noted above in the HPI.  A complete review of systems was performed with pertinent positives and negatives noted in the HPI, and all other systems negative.    Physical Exam   BP: (!) 148/91  Pulse: 115  Temp: 98.6  F (37  C)  Resp: 20  Height: 162.6 cm (5' 4\")  Weight: 77.1 kg (170 lb)  SpO2: 94 %  Physical Exam  Physical Exam   Constitutional: oriented to person, place, and time. appears well-developed and well-nourished.   HENT:   Head: Normocephalic and atraumatic.   Neck: Normal range of motion.   Pulmonary/Chest: Effort normal. No respiratory distress.   Cardiac: No murmurs, rubs, gallops. RRR.  Abdominal: Abdomen soft, nontender, nondistended. No rebound tenderness.  MSK: Long bones without deformity or evidence of trauma No tenderness or pain over the pelvis, hips, femurs, knees or lower legs. No T/L spine TTP.  Neurological: alert and oriented to person, place, and time. Chronic contracture of the right arm.  Gait stable..   Skin: Skin is warm and dry.   Psychiatric:  normal mood and affect.  behavior is normal. Thought content normal.     ED Course     12:36 AM  The patient was seen and examined by Andrew Chou MD in Room ED19.   Procedures     Results for orders placed or performed during the hospital encounter of 09/12/21   XR Chest 2 Views     Status: None    Narrative    EXAM: XR CHEST 2 VW  LOCATION: [facility]  DATE/TIME: 9/12/2021 2:47 AM    INDICATION: Sickle cell pain crisis.  COMPARISON: 9/4/2021.    FINDINGS: Left chest wall port. No pneumothorax. The heart size is normal. The lungs are clear. No pleural effusion.      Impression    " IMPRESSION: No acute abnormality.   CBC with platelets differential     Status: Abnormal    Narrative    The following orders were created for panel order CBC with platelets differential.  Procedure                               Abnormality         Status                     ---------                               -----------         ------                     CBC with platelets and d...[[id number]]  Abnormal            Final result                 Please view results for these tests on the individual orders.   Reticulocyte count     Status: Abnormal   Result Value Ref Range    % Reticulocyte 23.1 (H) 0.5 - 2.0 %    Absolute Reticulocyte 0.403 (H) 0.025 - 0.095 10e6/uL   CBC with platelets and differential     Status: Abnormal   Result Value Ref Range    WBC Count 11.9 (H) 4.0 - 11.0 10e3/uL    RBC Count 1.75 (L) 3.80 - 5.20 10e6/uL    Hemoglobin 5.7 (LL) 11.7 - 15.7 g/dL    Hematocrit 16.1 (L) 35.0 - 47.0 %    MCV 92 78 - 100 fL    MCH 32.6 26.5 - 33.0 pg    MCHC 35.4 31.5 - 36.5 g/dL    RDW 23.7 (H) 10.0 - 15.0 %    Platelet Count 203 150 - 450 10e3/uL    % Neutrophils 62 %    % Lymphocytes 21 %    % Monocytes 10 %    % Eosinophils 5 %    % Basophils 1 %    % Immature Granulocytes 1 %    NRBCs per 100 WBC 5 (H) <1 /100    Absolute Neutrophils 7.3 1.6 - 8.3 10e3/uL    Absolute Lymphocytes 2.6 0.8 - 5.3 10e3/uL    Absolute Monocytes 1.2 0.0 - 1.3 10e3/uL    Absolute Eosinophils 0.6 0.0 - 0.7 10e3/uL    Absolute Basophils 0.2 0.0 - 0.2 10e3/uL    Absolute Immature Granulocytes 0.1 (H) <=0.0 10e3/uL    Absolute NRBCs 0.6 10e3/uL   Comprehensive metabolic panel     Status: Abnormal   Result Value Ref Range    Sodium 136 133 - 144 mmol/L    Potassium 3.7 3.4 - 5.3 mmol/L    Chloride 108 94 - 109 mmol/L    Carbon Dioxide (CO2) 22 20 - 32 mmol/L    Anion Gap 6 3 - 14 mmol/L    Urea Nitrogen 12 7 - 30 mg/dL    Creatinine 0.60 0.52 - 1.04 mg/dL    Calcium 8.3 (L) 8.5 - 10.1 mg/dL    Glucose 103 (H) 70 - 99 mg/dL    Alkaline  Phosphatase 68 40 - 150 U/L    AST 80 (H) 0 - 45 U/L    ALT 67 (H) 0 - 50 U/L    Protein Total 7.4 6.8 - 8.8 g/dL    Albumin 3.7 3.4 - 5.0 g/dL    Bilirubin Total 3.6 (H) 0.2 - 1.3 mg/dL    GFR Estimate >90 >60 mL/min/1.73m2   CBC with platelets differential     Status: Abnormal    Narrative    The following orders were created for panel order CBC with platelets differential.  Procedure                               Abnormality         Status                     ---------                               -----------         ------                     CBC with platelets and d...[[id number]]  Abnormal            Final result                 Please view results for these tests on the individual orders.   CBC with platelets and differential     Status: Abnormal   Result Value Ref Range    WBC Count 13.4 (H) 4.0 - 11.0 10e3/uL    RBC Count 1.96 (L) 3.80 - 5.20 10e6/uL    Hemoglobin 6.4 (LL) 11.7 - 15.7 g/dL    Hematocrit 17.7 (L) 35.0 - 47.0 %    MCV 90 78 - 100 fL    MCH 32.7 26.5 - 33.0 pg    MCHC 36.2 31.5 - 36.5 g/dL    RDW 22.8 (H) 10.0 - 15.0 %    Platelet Count 254 150 - 450 10e3/uL    % Neutrophils 59 %    % Lymphocytes 24 %    % Monocytes 8 %    % Eosinophils 6 %    % Basophils 2 %    % Immature Granulocytes 1 %    NRBCs per 100 WBC 5 (H) <1 /100    Absolute Neutrophils 8.0 1.6 - 8.3 10e3/uL    Absolute Lymphocytes 3.2 0.8 - 5.3 10e3/uL    Absolute Monocytes 1.1 0.0 - 1.3 10e3/uL    Absolute Eosinophils 0.8 (H) 0.0 - 0.7 10e3/uL    Absolute Basophils 0.2 0.0 - 0.2 10e3/uL    Absolute Immature Granulocytes 0.1 (H) <=0.0 10e3/uL    Absolute NRBCs 0.7 10e3/uL       ]   No results found for any visits on 09/12/21.  Medications   0.9% sodium chloride BOLUS (has no administration in time range)     Followed by   sodium chloride 0.9% infusion (has no administration in time range)   morphine (PF) injection 2 mg (has no administration in time range)   ketorolac (TORADOL) injection 15 mg (has no administration in time range)         Assessments & Plan (with Medical Decision Making)   MDM  Patient presenting with sickle cell pain crisis.  This is typical of her pain.  No symptoms of acute chest, no infiltrate or hypoxia, very unlikely acute chest.  She did have a hemoglobin below 6, discussed with hematology oncology who recommended repeating.  She does not have symptoms of anemia.  Repeat hemoglobin elevated to 6.4 which is not too far off of her baseline.  Per discussion with hematology oncology we will elected not to transfuse as she is otherwise stable without bleeding, no acute chest, CVA or other issue besides pain.  Pain did resolved after fluids, morphine and Toradol.  She otherwise appears well and is stable for discharge.  She feels comfortable with this plan and has gotten reasonable pain control.    I have reviewed the nursing notes. I have reviewed the findings, diagnosis, plan and need for follow up with the patient.    New Prescriptions    No medications on file       Final diagnoses:   Sickle cell pain crisis (H)     IVito am serving as a trained medical scribe to document services personally performed by Andrew Chou MD, based on the provider's statements to me.      Andrew NEWMAN MD, was physically present and have reviewed and verified the accuracy of this note documented by Vito Conner.   --  Andrew Chou  [facility] EMERGENCY DEPARTMENT  9/12/2021     Andrew Chou MD  09/12/21 [id number]

## 2021-09-14 ENCOUNTER — APPOINTMENT (OUTPATIENT)
Dept: GENERAL RADIOLOGY | Facility: CLINIC | Age: 21
End: 2021-09-14
Attending: EMERGENCY MEDICINE
Payer: COMMERCIAL

## 2021-09-14 ENCOUNTER — HOSPITAL ENCOUNTER (EMERGENCY)
Facility: CLINIC | Age: 21
Discharge: HOME | End: 2021-09-14
Attending: EMERGENCY MEDICINE | Admitting: EMERGENCY MEDICINE
Payer: COMMERCIAL

## 2021-09-14 VITALS
DIASTOLIC BLOOD PRESSURE: 65 MMHG | OXYGEN SATURATION: 94 % | TEMPERATURE: 98.4 F | SYSTOLIC BLOOD PRESSURE: 106 MMHG | RESPIRATION RATE: 16 BRPM | HEART RATE: 98 BPM

## 2021-09-14 DIAGNOSIS — D57.00 SICKLE CELL PAIN CRISIS (H): ICD-10-CM

## 2021-09-14 LAB
ALBUMIN SERPL-MCNC: 3.8 G/DL (ref 3.4–5)
ALP SERPL-CCNC: 70 U/L (ref 40–150)
ALT SERPL W P-5'-P-CCNC: 59 U/L (ref 0–50)
ANION GAP SERPL CALCULATED.3IONS-SCNC: 8 MMOL/L (ref 3–14)
AST SERPL W P-5'-P-CCNC: 78 U/L (ref 0–45)
ATRIAL RATE - MUSE: 103 BPM
BASOPHILS # BLD MANUAL: 0.3 10E3/UL (ref 0–0.2)
BASOPHILS NFR BLD MANUAL: 2 %
BILIRUB SERPL-MCNC: 3.2 MG/DL (ref 0.2–1.3)
BUN SERPL-MCNC: 10 MG/DL (ref 7–30)
CALCIUM SERPL-MCNC: 8.8 MG/DL (ref 8.5–10.1)
CHLORIDE BLD-SCNC: 110 MMOL/L (ref 94–109)
CO2 SERPL-SCNC: 20 MMOL/L (ref 20–32)
CREAT SERPL-MCNC: 0.62 MG/DL (ref 0.52–1.04)
DIASTOLIC BLOOD PRESSURE - MUSE: NORMAL MMHG
EOSINOPHIL # BLD MANUAL: 1.2 10E3/UL (ref 0–0.7)
EOSINOPHIL NFR BLD MANUAL: 8 %
ERYTHROCYTE [DISTWIDTH] IN BLOOD BY AUTOMATED COUNT: 27.5 % (ref 10–15)
GFR SERPL CREATININE-BSD FRML MDRD: >90 ML/MIN/1.73M2
GLUCOSE BLD-MCNC: 86 MG/DL (ref 70–99)
HCT VFR BLD AUTO: 19.7 % (ref 35–47)
HGB BLD-MCNC: 6.9 G/DL (ref 11.7–15.7)
INTERPRETATION ECG - MUSE: NORMAL
LYMPHOCYTES # BLD MANUAL: 3.7 10E3/UL (ref 0.8–5.3)
LYMPHOCYTES NFR BLD MANUAL: 24 %
MCH RBC QN AUTO: 32.5 PG (ref 26.5–33)
MCHC RBC AUTO-ENTMCNC: 35 G/DL (ref 31.5–36.5)
MCV RBC AUTO: 93 FL (ref 78–100)
MONOCYTES # BLD MANUAL: 0.9 10E3/UL (ref 0–1.3)
MONOCYTES NFR BLD MANUAL: 6 %
NEUTROPHILS # BLD MANUAL: 9.2 10E3/UL (ref 1.6–8.3)
NEUTROPHILS NFR BLD MANUAL: 60 %
NRBC # BLD AUTO: 2.6 10E3/UL
NRBC BLD MANUAL-RTO: 17 %
P AXIS - MUSE: 66 DEGREES
PLAT MORPH BLD: ABNORMAL
PLATELET # BLD AUTO: 308 10E3/UL (ref 150–450)
POLYCHROMASIA BLD QL SMEAR: ABNORMAL
POTASSIUM BLD-SCNC: 3.6 MMOL/L (ref 3.4–5.3)
PR INTERVAL - MUSE: 144 MS
PROT SERPL-MCNC: 7.7 G/DL (ref 6.8–8.8)
QRS DURATION - MUSE: 78 MS
QT - MUSE: 380 MS
QTC - MUSE: 497 MS
R AXIS - MUSE: 42 DEGREES
RBC # BLD AUTO: 2.12 10E6/UL (ref 3.8–5.2)
RBC MORPH BLD: ABNORMAL
SICKLE CELLS BLD QL SMEAR: ABNORMAL
SODIUM SERPL-SCNC: 138 MMOL/L (ref 133–144)
SYSTOLIC BLOOD PRESSURE - MUSE: NORMAL MMHG
T AXIS - MUSE: 25 DEGREES
TARGETS BLD QL SMEAR: SLIGHT
TROPONIN I SERPL-MCNC: <0.015 UG/L (ref 0–0.04)
VENTRICULAR RATE- MUSE: 103 BPM
WBC # BLD AUTO: 15.3 10E3/UL (ref 4–11)

## 2021-09-14 PROCEDURE — 36415 COLL VENOUS BLD VENIPUNCTURE: CPT | Performed by: EMERGENCY MEDICINE

## 2021-09-14 PROCEDURE — 96374 THER/PROPH/DIAG INJ IV PUSH: CPT | Performed by: EMERGENCY MEDICINE

## 2021-09-14 PROCEDURE — 99285 EMERGENCY DEPT VISIT HI MDM: CPT | Mod: 25 | Performed by: EMERGENCY MEDICINE

## 2021-09-14 PROCEDURE — 85027 COMPLETE CBC AUTOMATED: CPT | Performed by: EMERGENCY MEDICINE

## 2021-09-14 PROCEDURE — 93010 ELECTROCARDIOGRAM REPORT: CPT | Performed by: EMERGENCY MEDICINE

## 2021-09-14 PROCEDURE — 71046 X-RAY EXAM CHEST 2 VIEWS: CPT | Mod: 26 | Performed by: RADIOLOGY

## 2021-09-14 PROCEDURE — 93005 ELECTROCARDIOGRAM TRACING: CPT | Performed by: EMERGENCY MEDICINE

## 2021-09-14 PROCEDURE — 71046 X-RAY EXAM CHEST 2 VIEWS: CPT

## 2021-09-14 PROCEDURE — 80053 COMPREHEN METABOLIC PANEL: CPT | Performed by: EMERGENCY MEDICINE

## 2021-09-14 PROCEDURE — 250N000011 HC RX IP 250 OP 636: Performed by: EMERGENCY MEDICINE

## 2021-09-14 PROCEDURE — 96376 TX/PRO/DX INJ SAME DRUG ADON: CPT | Performed by: EMERGENCY MEDICINE

## 2021-09-14 PROCEDURE — 84484 ASSAY OF TROPONIN QUANT: CPT | Performed by: EMERGENCY MEDICINE

## 2021-09-14 PROCEDURE — 258N000003 HC RX IP 258 OP 636: Performed by: EMERGENCY MEDICINE

## 2021-09-14 PROCEDURE — 96361 HYDRATE IV INFUSION ADD-ON: CPT | Performed by: EMERGENCY MEDICINE

## 2021-09-14 RX ORDER — HEPARIN SODIUM (PORCINE) LOCK FLUSH IV SOLN 100 UNIT/ML 100 UNIT/ML
100 SOLUTION INTRAVENOUS ONCE
Status: COMPLETED | OUTPATIENT
Start: 2021-09-14 | End: 2021-09-14

## 2021-09-14 RX ORDER — MORPHINE SULFATE 4 MG/ML
2 INJECTION, SOLUTION INTRAMUSCULAR; INTRAVENOUS
Status: COMPLETED | OUTPATIENT
Start: 2021-09-14 | End: 2021-09-14

## 2021-09-14 RX ORDER — SODIUM CHLORIDE 9 MG/ML
INJECTION, SOLUTION INTRAVENOUS CONTINUOUS
Status: DISCONTINUED | OUTPATIENT
Start: 2021-09-14 | End: 2021-09-14 | Stop reason: HOSPADM

## 2021-09-14 RX ADMIN — SODIUM CHLORIDE 1000 ML: 9 INJECTION, SOLUTION INTRAVENOUS at 02:47

## 2021-09-14 RX ADMIN — SODIUM CHLORIDE: 9 INJECTION, SOLUTION INTRAVENOUS at 04:22

## 2021-09-14 RX ADMIN — MORPHINE SULFATE 2 MG: 4 INJECTION INTRAVENOUS at 02:46

## 2021-09-14 RX ADMIN — MORPHINE SULFATE 2 MG: 4 INJECTION INTRAVENOUS at 05:50

## 2021-09-14 RX ADMIN — MORPHINE SULFATE 2 MG: 4 INJECTION INTRAVENOUS at 04:22

## 2021-09-14 RX ADMIN — Medication 100 UNITS: at 06:23

## 2021-09-14 NOTE — ED PROVIDER NOTES
ED Provider Note  [facility]      History     Chief Complaint   Patient presents with     Back Pain     Chest Pain     HPI  Jennifer Cervantes is a 21 year old female who with a PMHx of sickle cell pain, CVA on thinners, presenting with pain since noon. Pain is in lower back and hips.  The patient initially said chest pain but is denying it to myself, no pleuritic chest pain. No dizziness, no weakness. Pain consistent with prior pain crises. Patient has no SOB, cough, abd pain, dysuria/hematuria. No change in bowel movements. Pain is 10/10. No fevers, n/v/d. No recent illnesses. No radiation down legs, weakness, incontinence, numbness between legs. Patient is taking thinners as prescribed.    Past Medical History  Past Medical History:   Diagnosis Date     Anxiety      Bleeding disorder (H)      Blood clotting disorder (H)      Cerebral infarction (H) 2015     Cognitive developmental delay     low IQ. 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 (H)     right hand contractures     Iron overload due to repeated red blood cell transfusions      Migraines      Multiple subsegmental pulmonary emboli without acute cor pulmonale (H) 02/01/2021     Oppositional defiant behavior      Superficial venous thrombosis of arm, right 03/25/2021     Uncomplicated asthma      Past Surgical History:   Procedure Laterality Date     AS INSERT TUNNELED CV 2 CATH W/O PORT/PUMP       C BREAST REDUCTION (INCLUDES LIPO) TIER 3 Bilateral 04/23/2019     CHOLECYSTECTOMY       INSERT PORT VASCULAR ACCESS Left 4/21/2021    Procedure: INSERTION, VASCULAR ACCESS PORT (NOT SURE ON SIDE UNTIL REMOVAL);  Surgeon: Rajan More MD;  Location: [facility] OR     IR CHEST PORT PLACEMENT > 5 YRS OF AGE  4/21/2021     IR CVC NON TUNNEL LINE REMOVAL  5/6/2021     IR CVC NON TUNNEL PLACEMENT  04/07/2020     IR CVC  NON TUNNEL PLACEMENT  4/30/2021     IR PORT REMOVAL LEFT  4/21/2021     REMOVE PORT VASCULAR ACCESS Left 4/21/2021    Procedure: REMOVAL, VASCULAR ACCESS PORT LEFT;  Surgeon: Rajan More MD;  Location: [facility] OR     REPAIR TENDON ELBOW Right 10/02/2019    Procedure: Right Elbow Flexor Lengthening, Flexor Pronator Slide Of Wrist and Finger, Thumb Adductor Lengthening;  Surgeon: Anai Franco MD;  [facility]     TONSILLECTOMY Bilateral 10/02/2019    Procedure: Bilateral Tonsillectomy;  Surgeon: Farhana Guy MD;  [facility]     acetaminophen (TYLENOL) 325 MG tablet  albuterol (PROAIR HFA/PROVENTIL HFA/VENTOLIN HFA) 108 (90 Base) MCG/ACT inhaler  albuterol (PROVENTIL) (2.5 MG/3ML) 0.083% neb solution  ARIPiprazole (ABILIFY) 2 MG tablet  aspirin (ASA) 81 MG chewable tablet  budesonide-formoterol (SYMBICORT) 160-4.5 MCG/ACT Inhaler  dabigatran ANTICOAGULANT (PRADAXA) 150 MG capsule  diclofenac (VOLTAREN) 1 % topical gel  diphenhydrAMINE (BENADRYL) 25 MG capsule  DULoxetine (CYMBALTA) 30 MG capsule  EPINEPHrine (ANY BX GENERIC EQUIV) 0.3 MG/0.3ML injection 2-pack  Hydroxyurea 1000 MG TABS  hydrOXYzine (ATARAX) 25 MG tablet  JADENU 360 MG tablet  lidocaine-prilocaine (EMLA) 2.5-2.5 % external cream  medroxyPROGESTERone (DEPO-PROVERA) 150 MG/ML IM injection  naloxone (NARCAN) 4 MG/0.1ML nasal spray  omeprazole (PRILOSEC) 20 MG DR capsule  ondansetron (ZOFRAN) 8 MG tablet  oxyCODONE (OXYCONTIN) 10 MG 12 hr tablet  oxyCODONE IR (ROXICODONE) 15 MG tablet      Allergies   Allergen Reactions     Contrast Dye      Hives and breathing issues     Fish-Derived Products Hives     Seafood Hives     Diagnostic X-Ray Materials      Gadolinium      Family History  Family History   Problem Relation Age of Onset     Sickle Cell Trait Mother      Hypertension Mother      Asthma Mother      Sickle Cell Trait Father      Social History   Social History     Tobacco Use     Smoking status: Never Smoker      Smokeless tobacco: Never Used   Substance Use Topics     Alcohol use: Not Currently     Alcohol/week: 0.0 standard drinks     Drug use: Never      Past medical history, past surgical history, medications, allergies, family history, and social history were reviewed with the patient. No additional pertinent items.       Review of Systems  A complete review of systems was performed with pertinent positives and negatives noted in the HPI, and all other systems negative.    Physical Exam   BP: 139/86  Pulse: 107  Temp: 98.4  F (36.9  C)  Resp: 16  SpO2: 90 %  Physical Exam  Physical Exam   Constitutional: oriented to person, place, and time. appears well-developed and well-nourished.   HENT:   Head: Normocephalic and atraumatic.   Neck: Normal range of motion.   Pulmonary/Chest: Effort normal. No respiratory distress.   Cardiac: No murmurs, rubs, gallops. RRR.No TTP over the chest wall.  Abdominal: Abdomen soft, nontender, nondistended. No rebound tenderness.  MSK: Long bones without deformity or evidence of trauma. No TTP over the T/L spine.  Tenderness palpation over pelvis.  No pain with logrolling of the hisp.  Neurological: alert and oriented to person, place, and time. Sensation grossly intact of the lower extremities.  Skin: Skin is warm and dry.   Psychiatric:  normal mood and affect.  behavior is normal. Thought content normal.     ED Course     ED Course as of Sep 14 0517   Tue Sep 14, 2021   0340 CBC with platelets differential(!!)     Procedures            EKG Interpretation:      Interpreted by Andrew Chou MD  Time reviewed: 0155  Symptoms at time of EKG: chest pain   Rhythm: sinus tachycardia  Rate: 103  Axis: normal  Ectopy: none  Conduction: normal  ST Segments/ T Waves: No ST-T wave changes  Q Waves: none  Comparison to prior: Unchanged    Clinical Impression: Sinus tachycardia, otherwise no signs of ischemic infarction or right heart strain             No results found for any visits on  09/14/21.  Medications   0.9% sodium chloride BOLUS (has no administration in time range)     Followed by   sodium chloride 0.9% infusion (has no administration in time range)   morphine (PF) injection 2 mg (has no administration in time range)        Assessments & Plan (with Medical Decision Making)   MDM  Patient presenting with sickle cell pain.  She states this is quite similar to previous episodes of sickle cell pain crisis.  She is denying chest pain to myself, suspicion for ACS, pulmonary embolism, pneumonia, acute chest is low however will get a chest x-ray to assess for infiltrates.  No red flags to suggest cauda equina, epidural abscess, epidural hematoma or other acute spine compression, will not MRI at this point.  She is given pain meds per pain plan.    Re eval: Patient's pain well controlled after two doses.  She feels comfortable going home.  X-ray is clear without signs of acute chest.  Work-up otherwise negative.  She will be discharged.    I have reviewed the nursing notes. I have reviewed the findings, diagnosis, plan and need for follow up with the patient.    New Prescriptions    No medications on file       Final diagnoses:   Sickle cell pain crisis (H)       --  Andrew Chou  [facility] EMERGENCY DEPARTMENT  9/14/2021     Andrew Chou MD  09/14/21 7473

## 2021-09-14 NOTE — ED TRIAGE NOTES
Patient presents to triage with low pain started this evening without relief from home pain medication regimen.  Patient has a cell plan for sickle cell crisis

## 2021-09-15 ENCOUNTER — ANCILLARY PROCEDURE (OUTPATIENT)
Dept: GENERAL RADIOLOGY | Facility: CLINIC | Age: 21
End: 2021-09-15
Attending: PHYSICIAN ASSISTANT
Payer: COMMERCIAL

## 2021-09-15 ENCOUNTER — TELEPHONE (OUTPATIENT)
Dept: ONCOLOGY | Facility: CLINIC | Age: 21
End: 2021-09-15

## 2021-09-15 ENCOUNTER — INFUSION THERAPY VISIT (OUTPATIENT)
Dept: ONCOLOGY | Facility: CLINIC | Age: 21
End: 2021-09-15
Attending: PEDIATRICS
Payer: COMMERCIAL

## 2021-09-15 ENCOUNTER — APPOINTMENT (OUTPATIENT)
Dept: LAB | Facility: CLINIC | Age: 21
End: 2021-09-15
Attending: PEDIATRICS
Payer: COMMERCIAL

## 2021-09-15 ENCOUNTER — HOME INFUSION (PRE-WILLOW HOME INFUSION) (OUTPATIENT)
Dept: PHARMACY | Facility: CLINIC | Age: 21
End: 2021-09-15

## 2021-09-15 VITALS
RESPIRATION RATE: 16 BRPM | BODY MASS INDEX: 29.52 KG/M2 | OXYGEN SATURATION: 98 % | WEIGHT: 172 LBS | SYSTOLIC BLOOD PRESSURE: 132 MMHG | TEMPERATURE: 98 F | HEART RATE: 109 BPM | DIASTOLIC BLOOD PRESSURE: 86 MMHG

## 2021-09-15 DIAGNOSIS — D57.00 SICKLE CELL PAIN CRISIS (H): Primary | ICD-10-CM

## 2021-09-15 DIAGNOSIS — D57.00 SICKLE CELL PAIN CRISIS (H): ICD-10-CM

## 2021-09-15 DIAGNOSIS — M25.552 HIP PAIN, BILATERAL: ICD-10-CM

## 2021-09-15 DIAGNOSIS — M25.551 HIP PAIN, BILATERAL: ICD-10-CM

## 2021-09-15 LAB
ALBUMIN SERPL-MCNC: 4.1 G/DL (ref 3.4–5)
ALP SERPL-CCNC: 78 U/L (ref 40–150)
ALT SERPL W P-5'-P-CCNC: 64 U/L (ref 0–50)
ANION GAP SERPL CALCULATED.3IONS-SCNC: 9 MMOL/L (ref 3–14)
AST SERPL W P-5'-P-CCNC: 87 U/L (ref 0–45)
BASOPHILS # BLD MANUAL: 0.7 10E3/UL (ref 0–0.2)
BASOPHILS NFR BLD MANUAL: 5 %
BILIRUB SERPL-MCNC: 3.5 MG/DL (ref 0.2–1.3)
BUN SERPL-MCNC: 7 MG/DL (ref 7–30)
CALCIUM SERPL-MCNC: 9 MG/DL (ref 8.5–10.1)
CHLORIDE BLD-SCNC: 112 MMOL/L (ref 94–109)
CO2 SERPL-SCNC: 20 MMOL/L (ref 20–32)
CREAT SERPL-MCNC: 0.55 MG/DL (ref 0.52–1.04)
ELLIPTOCYTES BLD QL SMEAR: SLIGHT
EOSINOPHIL # BLD MANUAL: 0.8 10E3/UL (ref 0–0.7)
EOSINOPHIL NFR BLD MANUAL: 6 %
ERYTHROCYTE [DISTWIDTH] IN BLOOD BY AUTOMATED COUNT: 28.5 % (ref 10–15)
GFR SERPL CREATININE-BSD FRML MDRD: >90 ML/MIN/1.73M2
GLUCOSE BLD-MCNC: 97 MG/DL (ref 70–99)
HCT VFR BLD AUTO: 21.9 % (ref 35–47)
HGB BLD-MCNC: 7.9 G/DL (ref 11.7–15.7)
LYMPHOCYTES # BLD MANUAL: 3.5 10E3/UL (ref 0.8–5.3)
LYMPHOCYTES NFR BLD MANUAL: 26 %
MCH RBC QN AUTO: 33.6 PG (ref 26.5–33)
MCHC RBC AUTO-ENTMCNC: 36.1 G/DL (ref 31.5–36.5)
MCV RBC AUTO: 93 FL (ref 78–100)
MONOCYTES # BLD MANUAL: 0.5 10E3/UL (ref 0–1.3)
MONOCYTES NFR BLD MANUAL: 4 %
NEUTROPHILS # BLD MANUAL: 8 10E3/UL (ref 1.6–8.3)
NEUTROPHILS NFR BLD MANUAL: 59 %
NRBC # BLD AUTO: 6.5 10E3/UL
NRBC BLD MANUAL-RTO: 48 %
PLAT MORPH BLD: ABNORMAL
PLATELET # BLD AUTO: 323 10E3/UL (ref 150–450)
POLYCHROMASIA BLD QL SMEAR: ABNORMAL
POTASSIUM BLD-SCNC: 3.8 MMOL/L (ref 3.4–5.3)
PROT SERPL-MCNC: 8.1 G/DL (ref 6.8–8.8)
RBC # BLD AUTO: 2.35 10E6/UL (ref 3.8–5.2)
RBC MORPH BLD: ABNORMAL
SICKLE CELLS BLD QL SMEAR: ABNORMAL
SODIUM SERPL-SCNC: 141 MMOL/L (ref 133–144)
TARGETS BLD QL SMEAR: SLIGHT
WBC # BLD AUTO: 13.6 10E3/UL (ref 4–11)

## 2021-09-15 PROCEDURE — 258N000003 HC RX IP 258 OP 636: Performed by: PEDIATRICS

## 2021-09-15 PROCEDURE — 96374 THER/PROPH/DIAG INJ IV PUSH: CPT

## 2021-09-15 PROCEDURE — 96361 HYDRATE IV INFUSION ADD-ON: CPT

## 2021-09-15 PROCEDURE — 85027 COMPLETE CBC AUTOMATED: CPT | Performed by: PEDIATRICS

## 2021-09-15 PROCEDURE — 250N000011 HC RX IP 250 OP 636: Performed by: PEDIATRICS

## 2021-09-15 PROCEDURE — 73523 X-RAY EXAM HIPS BI 5/> VIEWS: CPT | Performed by: RADIOLOGY

## 2021-09-15 PROCEDURE — 80053 COMPREHEN METABOLIC PANEL: CPT | Performed by: PEDIATRICS

## 2021-09-15 PROCEDURE — 96376 TX/PRO/DX INJ SAME DRUG ADON: CPT

## 2021-09-15 PROCEDURE — 36591 DRAW BLOOD OFF VENOUS DEVICE: CPT | Performed by: PEDIATRICS

## 2021-09-15 RX ORDER — HEPARIN SODIUM (PORCINE) LOCK FLUSH IV SOLN 100 UNIT/ML 100 UNIT/ML
5 SOLUTION INTRAVENOUS EVERY 8 HOURS PRN
Status: DISCONTINUED | OUTPATIENT
Start: 2021-09-15 | End: 2021-09-15 | Stop reason: HOSPADM

## 2021-09-15 RX ORDER — DIPHENHYDRAMINE HCL 25 MG
25 CAPSULE ORAL
Status: DISCONTINUED | OUTPATIENT
Start: 2021-09-15 | End: 2021-09-15 | Stop reason: HOSPADM

## 2021-09-15 RX ORDER — ONDANSETRON 8 MG/1
8 TABLET, FILM COATED ORAL
Status: CANCELLED
Start: 2021-10-01

## 2021-09-15 RX ORDER — DIPHENHYDRAMINE HCL 25 MG
25 CAPSULE ORAL
Status: CANCELLED
Start: 2021-10-01

## 2021-09-15 RX ORDER — MORPHINE SULFATE 2 MG/ML
2 INJECTION, SOLUTION INTRAMUSCULAR; INTRAVENOUS
Status: CANCELLED
Start: 2021-10-01

## 2021-09-15 RX ORDER — HEPARIN SODIUM,PORCINE 10 UNIT/ML
5 VIAL (ML) INTRAVENOUS
Status: CANCELLED | OUTPATIENT
Start: 2021-10-01

## 2021-09-15 RX ORDER — OXYCODONE HYDROCHLORIDE 15 MG/1
TABLET ORAL
Qty: 60 TABLET | Refills: 0 | OUTPATIENT
Start: 2021-09-15

## 2021-09-15 RX ORDER — HEPARIN SODIUM,PORCINE 10 UNIT/ML
5 VIAL (ML) INTRAVENOUS
Status: DISCONTINUED | OUTPATIENT
Start: 2021-09-15 | End: 2021-09-15 | Stop reason: HOSPADM

## 2021-09-15 RX ORDER — MORPHINE SULFATE 2 MG/ML
2 INJECTION, SOLUTION INTRAMUSCULAR; INTRAVENOUS
Status: COMPLETED | OUTPATIENT
Start: 2021-09-15 | End: 2021-09-15

## 2021-09-15 RX ORDER — ONDANSETRON 8 MG/1
8 TABLET, ORALLY DISINTEGRATING ORAL
Status: DISCONTINUED | OUTPATIENT
Start: 2021-09-15 | End: 2021-09-15 | Stop reason: HOSPADM

## 2021-09-15 RX ORDER — HEPARIN SODIUM (PORCINE) LOCK FLUSH IV SOLN 100 UNIT/ML 100 UNIT/ML
5 SOLUTION INTRAVENOUS
Status: CANCELLED | OUTPATIENT
Start: 2021-10-01

## 2021-09-15 RX ORDER — HEPARIN SODIUM (PORCINE) LOCK FLUSH IV SOLN 100 UNIT/ML 100 UNIT/ML
5 SOLUTION INTRAVENOUS
Status: DISCONTINUED | OUTPATIENT
Start: 2021-09-15 | End: 2021-09-15 | Stop reason: HOSPADM

## 2021-09-15 RX ADMIN — MORPHINE SULFATE 2 MG: 2 INJECTION, SOLUTION INTRAMUSCULAR; INTRAVENOUS at 13:45

## 2021-09-15 RX ADMIN — Medication 5 ML: at 16:12

## 2021-09-15 RX ADMIN — MORPHINE SULFATE 2 MG: 2 INJECTION, SOLUTION INTRAMUSCULAR; INTRAVENOUS at 15:51

## 2021-09-15 RX ADMIN — MORPHINE SULFATE 2 MG: 2 INJECTION, SOLUTION INTRAMUSCULAR; INTRAVENOUS at 14:44

## 2021-09-15 RX ADMIN — DEXTROSE AND SODIUM CHLORIDE 500 ML: 5; 450 INJECTION, SOLUTION INTRAVENOUS at 13:41

## 2021-09-15 RX ADMIN — Medication 5 ML: at 13:03

## 2021-09-15 ASSESSMENT — PAIN SCALES - GENERAL: PAINLEVEL: EXTREME PAIN (9)

## 2021-09-15 NOTE — TELEPHONE ENCOUNTER
Narcotic Refill Request    Medication(s) requested:   Oxycodone 15mg IR  What pain is the medication treating: SIckel cell pain  How is the medication being taken?:6 per day Max  Does pt have enough for today? Yes, scheduled to run out by Sunday 9/19/21.   Is pain being adequately controlled on the current regimen?: okay, is requiring IVF/Pain infusions  Experiencing any side effects from medication?: denies    Date of most recent appointment:  8/24/21 with Andrei HIGGINS  Next appointment:    9/20/21 with Dr. Duncan  Last fill date and by whom:  9/9/21   Reviewed: Not an agent    Informed pt [facility] open on Saturday from 8-12pm.    Routed provider Andrei HIGGINS

## 2021-09-15 NOTE — NURSING NOTE
Chief Complaint   Patient presents with     Port Draw     Labs drawn via PORT by RN in lab. VS taken.      .Kaveh Ellsworth RN

## 2021-09-15 NOTE — TELEPHONE ENCOUNTER
Pt called in to triage requesting IVF pain meds for lower back and bilateral hip pain rated 8/10 x 8 hours since 12 MN. Stated last took prn oxycontin, oxycodone, tylenol, and muscle relaxant at 7 a.m. this morning without much relief. Denied any fevers, chills, cough, sob, chest pain or other symptoms. Pt's last infusion was ED on 9/14, last clinic visit 8/24/21 with follow-up on 9/20/21 with Dr. Duncan. Pt denied being out of home medications and needing any refills today. ED visit last night.    Lives 15 min from hospital, needs ride  Paged Andrei for approval d/t ED visit last night. Per Andrei, Ok for her to come in.   Need bilateral hip XR to rule out avascular necrosis.   Needs Labs, standing orders under Andrei.  Pt added to [facility] infusion wait list.    Routed to Andrei Krishnamurthy and Care Team.

## 2021-09-15 NOTE — PROGRESS NOTES
Infusion Nursing Note:  Jennifer Cervantes presents today for Pain medication and IVF  Patient seen by provider today: No   present during visit today: Not Applicable.    Note: Jennifer arrived to infusion in sickle cell crisis, she rates her chronic side and lower back pain a 9/10. She denies any pain anywhere else or other symptoms/ concerns today. She last took her PRN Oxycodone at 0700 this morning without pain relief. Heat packs given to patient and IV Morphine given x3- pain decreased to 6/10 and patient stated this is a comfortable level for her to discharge at. Denies any dizziness or lightheadedness.    Intravenous Access:  Port    Treatment Conditions:  Lab Results   Component Value Date    HGB 7.9 (L) 09/15/2021    WBC 13.6 (H) 09/15/2021    ANEU 8.0 09/15/2021    ANEUTAUTO 8.0 09/12/2021     09/15/2021      Lab Results   Component Value Date     09/15/2021    POTASSIUM 3.8 09/15/2021    MAG 1.7 02/21/2021    CR 0.55 09/15/2021    MICAH 9.0 09/15/2021    BILITOTAL 3.5 (H) 09/15/2021    ALBUMIN 4.1 09/15/2021    ALT 64 (H) 09/15/2021    AST 87 (H) 09/15/2021       Post Infusion Assessment:  Patient tolerated infusion without incident.  Blood return noted pre and post infusion.  No evidence of extravasations.  Access discontinued per protocol.     Discharge Plan:   Patient declined prescription refills.  AVS to patient via [employer].  Patient will return 9/22 for next appointment.   Patient discharged in stable condition accompanied by: self.  Departure Mode: Ambulatory.  Face to Face time: 0.      Anne Molina RN

## 2021-09-15 NOTE — TELEPHONE ENCOUNTER
Called about status of IVF/Pain infusion.   As of 11:43am still waiting for appt to open up.        Has own transportation takes 15-20minutes to get to clinic.       Appt available today at 12:30pm.     Jennifer is able to be here in clinic by 12:30pm.     Scheduling notified for:  Bilateral Hip XR today at 12:30pm  Labs at 1:00pm  IVF/Pain at 1:30pm.     Updated Jennifer on schedule/plan for today.

## 2021-09-16 ENCOUNTER — HOME INFUSION (PRE-WILLOW HOME INFUSION) (OUTPATIENT)
Dept: PHARMACY | Facility: CLINIC | Age: 21
End: 2021-09-16

## 2021-09-16 NOTE — PROGRESS NOTES
This is a recent snapshot of the patient's [facility] medical record.  For current drug dose and complete information and questions, call [phone] or In Basket pool, fv home infusion ([id number])  CSN Number:  [id number]

## 2021-09-16 NOTE — TELEPHONE ENCOUNTER
[facility] Triage    Incoming Call - RX Refill - Jennifer wondering about status of refill    Follow up from Jennifer - The refill should be ready Friday or Saturday    Rerouting to provider for follow up

## 2021-09-17 ENCOUNTER — APPOINTMENT (OUTPATIENT)
Dept: GENERAL RADIOLOGY | Facility: CLINIC | Age: 21
End: 2021-09-17
Attending: EMERGENCY MEDICINE
Payer: COMMERCIAL

## 2021-09-17 ENCOUNTER — HOSPITAL ENCOUNTER (EMERGENCY)
Facility: CLINIC | Age: 21
Discharge: HOME | End: 2021-09-17
Attending: EMERGENCY MEDICINE | Admitting: EMERGENCY MEDICINE
Payer: COMMERCIAL

## 2021-09-17 VITALS
OXYGEN SATURATION: 90 % | RESPIRATION RATE: 16 BRPM | BODY MASS INDEX: 29.37 KG/M2 | SYSTOLIC BLOOD PRESSURE: 118 MMHG | DIASTOLIC BLOOD PRESSURE: 74 MMHG | WEIGHT: 172 LBS | HEIGHT: 64 IN | TEMPERATURE: 98.4 F | HEART RATE: 94 BPM

## 2021-09-17 DIAGNOSIS — M54.6 ACUTE BILATERAL THORACIC BACK PAIN: ICD-10-CM

## 2021-09-17 DIAGNOSIS — R07.89 ATYPICAL CHEST PAIN: ICD-10-CM

## 2021-09-17 DIAGNOSIS — E83.111 IRON OVERLOAD DUE TO REPEATED RED BLOOD CELL TRANSFUSIONS: ICD-10-CM

## 2021-09-17 DIAGNOSIS — D57.00 SICKLE CELL PAIN CRISIS (H): ICD-10-CM

## 2021-09-17 LAB
ANION GAP SERPL CALCULATED.3IONS-SCNC: 7 MMOL/L (ref 3–14)
ATRIAL RATE - MUSE: 100 BPM
BASOPHILS # BLD MANUAL: 0.3 10E3/UL (ref 0–0.2)
BASOPHILS NFR BLD MANUAL: 2 %
BUN SERPL-MCNC: 8 MG/DL (ref 7–30)
CALCIUM SERPL-MCNC: 8.7 MG/DL (ref 8.5–10.1)
CHLORIDE BLD-SCNC: 112 MMOL/L (ref 94–109)
CO2 SERPL-SCNC: 21 MMOL/L (ref 20–32)
CREAT SERPL-MCNC: 0.53 MG/DL (ref 0.52–1.04)
DIASTOLIC BLOOD PRESSURE - MUSE: NORMAL MMHG
ELLIPTOCYTES BLD QL SMEAR: SLIGHT
EOSINOPHIL # BLD MANUAL: 1.2 10E3/UL (ref 0–0.7)
EOSINOPHIL NFR BLD MANUAL: 9 %
ERYTHROCYTE [DISTWIDTH] IN BLOOD BY AUTOMATED COUNT: 27.1 % (ref 10–15)
GFR SERPL CREATININE-BSD FRML MDRD: >90 ML/MIN/1.73M2
GLUCOSE BLD-MCNC: 90 MG/DL (ref 70–99)
HCT VFR BLD AUTO: 21.3 % (ref 35–47)
HGB BLD-MCNC: 7.5 G/DL (ref 11.7–15.7)
HOLD SPECIMEN: NORMAL
HOLD SPECIMEN: NORMAL
INTERPRETATION ECG - MUSE: NORMAL
LACTATE SERPL-SCNC: 0.7 MMOL/L (ref 0.7–2)
LYMPHOCYTES # BLD MANUAL: 0.5 10E3/UL (ref 0.8–5.3)
LYMPHOCYTES NFR BLD MANUAL: 4 %
MCH RBC QN AUTO: 33.5 PG (ref 26.5–33)
MCHC RBC AUTO-ENTMCNC: 35.2 G/DL (ref 31.5–36.5)
MCV RBC AUTO: 95 FL (ref 78–100)
MONOCYTES # BLD MANUAL: 0.9 10E3/UL (ref 0–1.3)
MONOCYTES NFR BLD MANUAL: 7 %
NEUTROPHILS # BLD MANUAL: 10.5 10E3/UL (ref 1.6–8.3)
NEUTROPHILS NFR BLD MANUAL: 78 %
NRBC # BLD AUTO: 4.7 10E3/UL
NRBC BLD MANUAL-RTO: 35 %
P AXIS - MUSE: 54 DEGREES
PLAT MORPH BLD: ABNORMAL
PLATELET # BLD AUTO: 274 10E3/UL (ref 150–450)
POTASSIUM BLD-SCNC: 3.6 MMOL/L (ref 3.4–5.3)
PR INTERVAL - MUSE: 158 MS
QRS DURATION - MUSE: 76 MS
QT - MUSE: 308 MS
QTC - MUSE: 397 MS
R AXIS - MUSE: 40 DEGREES
RBC # BLD AUTO: 2.24 10E6/UL (ref 3.8–5.2)
RBC MORPH BLD: ABNORMAL
RETICS # AUTO: 0.69 10E6/UL (ref 0.03–0.1)
RETICS/RBC NFR AUTO: >30 % (ref 0.5–2)
SICKLE CELLS BLD QL SMEAR: ABNORMAL
SODIUM SERPL-SCNC: 140 MMOL/L (ref 133–144)
SYSTOLIC BLOOD PRESSURE - MUSE: NORMAL MMHG
T AXIS - MUSE: -4 DEGREES
TARGETS BLD QL SMEAR: SLIGHT
TROPONIN I SERPL-MCNC: <0.015 UG/L (ref 0–0.04)
VENTRICULAR RATE- MUSE: 100 BPM
WBC # BLD AUTO: 13.4 10E3/UL (ref 4–11)

## 2021-09-17 PROCEDURE — 82728 ASSAY OF FERRITIN: CPT

## 2021-09-17 PROCEDURE — 258N000003 HC RX IP 258 OP 636: Performed by: EMERGENCY MEDICINE

## 2021-09-17 PROCEDURE — 84484 ASSAY OF TROPONIN QUANT: CPT | Performed by: EMERGENCY MEDICINE

## 2021-09-17 PROCEDURE — 93010 ELECTROCARDIOGRAM REPORT: CPT | Performed by: EMERGENCY MEDICINE

## 2021-09-17 PROCEDURE — 96374 THER/PROPH/DIAG INJ IV PUSH: CPT | Performed by: EMERGENCY MEDICINE

## 2021-09-17 PROCEDURE — 96376 TX/PRO/DX INJ SAME DRUG ADON: CPT | Performed by: EMERGENCY MEDICINE

## 2021-09-17 PROCEDURE — 96361 HYDRATE IV INFUSION ADD-ON: CPT | Performed by: EMERGENCY MEDICINE

## 2021-09-17 PROCEDURE — 96375 TX/PRO/DX INJ NEW DRUG ADDON: CPT | Performed by: EMERGENCY MEDICINE

## 2021-09-17 PROCEDURE — 71046 X-RAY EXAM CHEST 2 VIEWS: CPT

## 2021-09-17 PROCEDURE — 99285 EMERGENCY DEPT VISIT HI MDM: CPT | Mod: 25 | Performed by: EMERGENCY MEDICINE

## 2021-09-17 PROCEDURE — 83605 ASSAY OF LACTIC ACID: CPT | Performed by: EMERGENCY MEDICINE

## 2021-09-17 PROCEDURE — 36415 COLL VENOUS BLD VENIPUNCTURE: CPT | Performed by: EMERGENCY MEDICINE

## 2021-09-17 PROCEDURE — 93005 ELECTROCARDIOGRAM TRACING: CPT | Performed by: EMERGENCY MEDICINE

## 2021-09-17 PROCEDURE — 250N000011 HC RX IP 250 OP 636: Performed by: EMERGENCY MEDICINE

## 2021-09-17 PROCEDURE — 80048 BASIC METABOLIC PNL TOTAL CA: CPT | Performed by: EMERGENCY MEDICINE

## 2021-09-17 PROCEDURE — 85027 COMPLETE CBC AUTOMATED: CPT | Performed by: EMERGENCY MEDICINE

## 2021-09-17 PROCEDURE — 85045 AUTOMATED RETICULOCYTE COUNT: CPT | Performed by: EMERGENCY MEDICINE

## 2021-09-17 PROCEDURE — 71046 X-RAY EXAM CHEST 2 VIEWS: CPT | Mod: 26 | Performed by: RADIOLOGY

## 2021-09-17 PROCEDURE — 250N000011 HC RX IP 250 OP 636: Performed by: FAMILY MEDICINE

## 2021-09-17 RX ORDER — KETOROLAC TROMETHAMINE 15 MG/ML
15 INJECTION, SOLUTION INTRAMUSCULAR; INTRAVENOUS ONCE
Status: COMPLETED | OUTPATIENT
Start: 2021-09-17 | End: 2021-09-17

## 2021-09-17 RX ORDER — DIPHENHYDRAMINE HCL 25 MG
25 CAPSULE ORAL
Status: DISCONTINUED | OUTPATIENT
Start: 2021-09-17 | End: 2021-09-17 | Stop reason: HOSPADM

## 2021-09-17 RX ORDER — ONDANSETRON 2 MG/ML
8 INJECTION INTRAMUSCULAR; INTRAVENOUS
Status: DISCONTINUED | OUTPATIENT
Start: 2021-09-17 | End: 2021-09-17 | Stop reason: HOSPADM

## 2021-09-17 RX ORDER — HEPARIN SODIUM (PORCINE) LOCK FLUSH IV SOLN 100 UNIT/ML 100 UNIT/ML
500 SOLUTION INTRAVENOUS ONCE
Status: COMPLETED | OUTPATIENT
Start: 2021-09-17 | End: 2021-09-17

## 2021-09-17 RX ORDER — MORPHINE SULFATE 4 MG/ML
2 INJECTION, SOLUTION INTRAMUSCULAR; INTRAVENOUS
Status: COMPLETED | OUTPATIENT
Start: 2021-09-17 | End: 2021-09-17

## 2021-09-17 RX ORDER — OXYCODONE HYDROCHLORIDE 15 MG/1
TABLET ORAL
Qty: 60 TABLET | Refills: 0 | Status: SHIPPED | OUTPATIENT
Start: 2021-09-17 | End: 2021-09-27

## 2021-09-17 RX ADMIN — KETOROLAC TROMETHAMINE 15 MG: 15 INJECTION, SOLUTION INTRAMUSCULAR; INTRAVENOUS at 05:23

## 2021-09-17 RX ADMIN — MORPHINE SULFATE 2 MG: 4 INJECTION INTRAVENOUS at 05:22

## 2021-09-17 RX ADMIN — Medication 500 UNITS: at 11:56

## 2021-09-17 RX ADMIN — SODIUM CHLORIDE, POTASSIUM CHLORIDE, SODIUM LACTATE AND CALCIUM CHLORIDE 1000 ML: 600; 310; 30; 20 INJECTION, SOLUTION INTRAVENOUS at 05:22

## 2021-09-17 RX ADMIN — MORPHINE SULFATE 2 MG: 4 INJECTION INTRAVENOUS at 06:36

## 2021-09-17 RX ADMIN — MORPHINE SULFATE 2 MG: 4 INJECTION INTRAVENOUS at 10:04

## 2021-09-17 ASSESSMENT — MIFFLIN-ST. JEOR: SCORE: 1525.19

## 2021-09-17 NOTE — ED PROVIDER NOTES
"Patient initially treated and evaluated by Dr. Domingo refer to his note.  Patient with sickle cell disease with crisis symptoms.  Patient received her protocol for pain control is feeling much better reported by nursing staff.  Patient ready to go home.  Patient be discharged and follow-up.  /78   Pulse 95   Temp 98.4  F (36.9  C) (Oral)   Resp 16   Ht 1.626 m (5' 4\")   Wt 78 kg (172 lb)   SpO2 90%   BMI 29.52 kg/m        This note was created at least in part by the use of dragon voice dictation system. Inadvertent typographical errors may still exist.  Pedro Ryan MD.    Patient evaluated in the emergency department during the COVID-19 pandemic period. Careful attention to patients safety was addressed throughout the evaluation. Evaluation and treatment management was initiated with disposition made efficiently and appropriate as possible to minimize any risk of potential exposure to patient during this evaluation.       Pedro Ryan MD  09/17/21 1136    "

## 2021-09-17 NOTE — DISCHARGE INSTRUCTIONS
Instructions from your doctor today:  Emergency Department testing is focused on the potential causes of your symptoms that are the most dangerous possibilities, and cannot cover every possibility. Based on the evaluation, it was deemed sufficiently safe to discharge and continue management through the clinics. Thus, follow-up is very important to assess for improvement/worsening, potential further testing, and potential treatment adjustments. If you were given opioid pain medications or other medications that can make you drowsy while in the ED, you should not drive for at least several hours and not until you feel completely back to normal.     Please make an appointment to follow up with:  - Hematology Oncology Clinic (phone: [phone]) in 1-3 days  - If you do not have a primary care provider, you can be seen in follow-up and establish care by calling any of the clinics below:     - Primary Care Center (phone: [phone])     - Primary Care / [facility] (phone: [phone])   - Have your clinic provider review the results from today's visit with you again, including any potential follow-up or additional testing that may be needed based on the results. Occasionally, incidental findings are found on later review by radiologists that may need follow-up.     Return to the Emergency Department immediately if you have worsening symptoms, or any other urgent or potentially life-threatening concerns.

## 2021-09-17 NOTE — PROGRESS NOTES
This is a recent snapshot of the patient's [facility] medical record.  For current drug dose and complete information and questions, call [phone] or In Basket pool, fv home infusion ([id number])  CSN Number:  [id number]

## 2021-09-17 NOTE — ED PROVIDER NOTES
History     Chief Complaint   Patient presents with     Sickle Cell Pain Crisis     HPI  Jennifer Cervantes is a 21 year old female with PMH notable for sickle cell anemia who presents to the ED with thoracic back pain and chest pain.  Reports symptom onset yesterday.  Gradual onset.  No trauma.  No shortness of breath.  Pain is constant, radiates through the thoracic to the lumbar back.  Quality is aching similar to past sickle cell flares.  Patient reports the chest pain feels somewhat different than past sickle pains.  It is substernal in location and nonradiating.  No recent cough no fever.     Past Medical History  Past Medical History:   Diagnosis Date     Anxiety      Bleeding disorder (H)      Blood clotting disorder (H)      Cerebral infarction (H) 2015     Cognitive developmental delay     low IQ. 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 (H)     right hand contractures     Iron overload due to repeated red blood cell transfusions      Migraines      Multiple subsegmental pulmonary emboli without acute cor pulmonale (H) 02/01/2021     Oppositional defiant behavior      Superficial venous thrombosis of arm, right 03/25/2021     Uncomplicated asthma      Past Surgical History:   Procedure Laterality Date     AS INSERT TUNNELED CV 2 CATH W/O PORT/PUMP       C BREAST REDUCTION (INCLUDES LIPO) TIER 3 Bilateral 04/23/2019     CHOLECYSTECTOMY       INSERT PORT VASCULAR ACCESS Left 4/21/2021    Procedure: INSERTION, VASCULAR ACCESS PORT (NOT SURE ON SIDE UNTIL REMOVAL);  Surgeon: Rajan More MD;  Location: [facility] OR     IR CHEST PORT PLACEMENT > 5 YRS OF AGE  4/21/2021     IR CVC NON TUNNEL LINE REMOVAL  5/6/2021     IR CVC NON TUNNEL PLACEMENT  04/07/2020     IR CVC NON TUNNEL PLACEMENT  4/30/2021     IR PORT REMOVAL LEFT  4/21/2021     REMOVE PORT VASCULAR ACCESS Left 4/21/2021     Procedure: REMOVAL, VASCULAR ACCESS PORT LEFT;  Surgeon: Rajan More MD;  Location: [facility] OR     REPAIR TENDON ELBOW Right 10/02/2019    Procedure: Right Elbow Flexor Lengthening, Flexor Pronator Slide Of Wrist and Finger, Thumb Adductor Lengthening;  Surgeon: Anai Franco MD;  [facility]     TONSILLECTOMY Bilateral 10/02/2019    Procedure: Bilateral Tonsillectomy;  Surgeon: Farhana Guy MD;  [facility]     acetaminophen (TYLENOL) 325 MG tablet  albuterol (PROAIR HFA/PROVENTIL HFA/VENTOLIN HFA) 108 (90 Base) MCG/ACT inhaler  albuterol (PROVENTIL) (2.5 MG/3ML) 0.083% neb solution  ARIPiprazole (ABILIFY) 2 MG tablet  aspirin (ASA) 81 MG chewable tablet  budesonide-formoterol (SYMBICORT) 160-4.5 MCG/ACT Inhaler  dabigatran ANTICOAGULANT (PRADAXA) 150 MG capsule  diclofenac (VOLTAREN) 1 % topical gel  diphenhydrAMINE (BENADRYL) 25 MG capsule  DULoxetine (CYMBALTA) 30 MG capsule  EPINEPHrine (ANY BX GENERIC EQUIV) 0.3 MG/0.3ML injection 2-pack  Hydroxyurea 1000 MG TABS  hydrOXYzine (ATARAX) 25 MG tablet  JADENU 360 MG tablet  lidocaine-prilocaine (EMLA) 2.5-2.5 % external cream  medroxyPROGESTERone (DEPO-PROVERA) 150 MG/ML IM injection  naloxone (NARCAN) 4 MG/0.1ML nasal spray  omeprazole (PRILOSEC) 20 MG DR capsule  ondansetron (ZOFRAN) 8 MG tablet  oxyCODONE (OXYCONTIN) 10 MG 12 hr tablet  oxyCODONE IR (ROXICODONE) 15 MG tablet      Allergies   Allergen Reactions     Contrast Dye      Hives and breathing issues     Fish-Derived Products Hives     Seafood Hives     Diagnostic X-Ray Materials      Gadolinium      Social History   Social History     Tobacco Use     Smoking status: Never Smoker     Smokeless tobacco: Never Used   Substance Use Topics     Alcohol use: Not Currently     Alcohol/week: 0.0 standard drinks     Drug use: Never      Past medical history and social history were reviewed with the patient. Additional pertinent items: None     Review of Systems  A complete review  "of systems was performed with pertinent positives and negatives noted in the HPI, and all other systems negative.    Physical Exam   BP: 133/76  Pulse: 112  Temp: 98.4  F (36.9  C)  Resp: 16  Height: 162.6 cm (5' 4\")  Weight: 78 kg (172 lb)  SpO2: 91 %    Physical Exam  General: mildly uncomfortable appearing. Appears stated age.   HENT: MMM, no oropharyngeal lesions  Eyes: PERRL, normal sclerae  Neck: non-tender, supple  Cardio: borderline tachycardic rate. Regular rhythm. Extremities well perfused  Resp: Normal work of breathing, normal respiratory rate. CTA b/l.   Chest/Back: no visual signs of trauma, no CVA tenderness, there is thoracic and lumbar midline and bilateral paraspinal tenderness  Abdomen: no tenderness, non-distended, no rebound, no guarding  Neuro: alert and fully oriented. CN II-XII grossly intact. Grossly normal strength and sensation in all extremities.   MSK: no deformities. Grossly normal ROM in extremities.   Integumentary/Skin: no rash visualized, normal color  Psych: normal affect, normal behavior    ED Course      Procedures            EKG Interpretation:      Interpreted by Huang Domingo MD  Time reviewed: 0120  Symptoms at time of EKG: chest pain   Rhythm: normal sinus   Rate: Normal  Axis: Normal  Ectopy: none  Conduction: normal  ST Segments/ T Waves: No acute ischemic changes  Q Waves: none  Comparison to prior: Unchanged from 12/10/2020    Clinical Impression: normal EKG     Labs Ordered and Resulted from Time of ED Arrival Up to the Time of Departure from the ED   RETICULOCYTE COUNT - Abnormal; Notable for the following components:       Result Value    % Reticulocyte >30.0 (*)     Absolute Reticulocyte 0.691 (*)     All other components within normal limits   BASIC METABOLIC PANEL - Abnormal; Notable for the following components:    Chloride 112 (*)     All other components within normal limits   CBC WITH PLATELETS AND DIFFERENTIAL - Abnormal; Notable for the following " components:    WBC Count 13.4 (*)     RBC Count 2.24 (*)     Hemoglobin 7.5 (*)     Hematocrit 21.3 (*)     MCH 33.5 (*)     RDW 27.1 (*)     All other components within normal limits   DIFFERENTIAL - Abnormal; Notable for the following components:    NRBCs per 100 WBC 35 (*)     Absolute Neutrophils 10.5 (*)     Absolute Lymphocytes 0.5 (*)     Absolute Eosinophils 1.2 (*)     Absolute Basophils 0.3 (*)     Absolute NRBCs 4.7 (*)     Elliptocytes Slight (*)     Sickle Cells Marked (*)     Target Cells Slight (*)     All other components within normal limits   LACTIC ACID WHOLE BLOOD - Normal   TROPONIN I - Normal   EXTRA BLUE TOP TUBE   EXTRA RED TOP TUBE   PERIPHERAL IV CATHETER   CBC WITH PLATELETS & DIFFERENTIAL    Narrative:     The following orders were created for panel order CBC with platelets differential.  Procedure                               Abnormality         Status                     ---------                               -----------         ------                     CBC with platelets and d...[[id number]]  Abnormal            Final result               Manual Differential[[id number]]          Abnormal            Final result                 Please view results for these tests on the individual orders.   EXTRA TUBE    Narrative:     The following orders were created for panel order [place] Draw.  Procedure                               Abnormality         Status                     ---------                               -----------         ------                     Extra Blue Top Tube[[id number]]                              Final result               Extra Red Top Tube[[id number]]                               Final result                 Please view results for these tests on the individual orders.     Chest XR,  PA & LAT   Final Result   IMPRESSION: No evidence of active cardiopulmonary disease.                             Assessments & Plan (with Medical Decision Making)   Patient presenting  with chest and back pain in the context of sickle cell disease with frequent pain flares. Vitals in the ED notable for initial mild tachycardia. Nursing notes reviewed. Initial differential diagnosis includes but not limited to sickle pain flare, acute chest syndrome, ACS, PTX.     ECG and troponin without evidence of acute cardiac ischemia.  Electrolytes unremarkable.  Hemoglobin 7.5, stable from previous.  Lactate normal.  Chest x-ray without evidence of acute chest syndrome.  Reticulocyte count appropriately elevated for sickle pain crisis.    In the ED, the patient's symptoms were managed with morphine, ketorolac, LR bolus consistent with her established emergency department care plan, with improvement in symptoms upon reassessment.     The complete clinical picture is most consistent with sickle pain crisis.  Patient signed out to oncoming provider with plan for reassessment of pain control degree after third dose of morphine per her care plan, likely discharge.    Final diagnoses:   Sickle cell pain crisis (H)   Atypical chest pain   Acute bilateral thoracic back pain     New Prescriptions    No medications on file       --  Huang Domingo MD   Emergency Medicine   [facility] EMERGENCY DEPARTMENT  9/17/2021     Huang Domingo MD  09/17/21 1751

## 2021-09-18 ENCOUNTER — HEALTH MAINTENANCE LETTER (OUTPATIENT)
Age: 21
End: 2021-09-18

## 2021-09-20 ENCOUNTER — APPOINTMENT (OUTPATIENT)
Dept: GENERAL RADIOLOGY | Facility: CLINIC | Age: 21
End: 2021-09-20
Attending: EMERGENCY MEDICINE
Payer: COMMERCIAL

## 2021-09-20 ENCOUNTER — HOSPITAL ENCOUNTER (EMERGENCY)
Facility: CLINIC | Age: 21
Discharge: HOME | End: 2021-09-20
Attending: EMERGENCY MEDICINE | Admitting: EMERGENCY MEDICINE
Payer: COMMERCIAL

## 2021-09-20 ENCOUNTER — PATIENT OUTREACH (OUTPATIENT)
Dept: ONCOLOGY | Facility: CLINIC | Age: 21
End: 2021-09-20

## 2021-09-20 ENCOUNTER — VIRTUAL VISIT (OUTPATIENT)
Dept: ONCOLOGY | Facility: CLINIC | Age: 21
End: 2021-09-20
Attending: PEDIATRICS
Payer: COMMERCIAL

## 2021-09-20 ENCOUNTER — PRE VISIT (OUTPATIENT)
Dept: CARDIOLOGY | Facility: CLINIC | Age: 21
End: 2021-09-20

## 2021-09-20 VITALS
WEIGHT: 172 LBS | TEMPERATURE: 98.6 F | OXYGEN SATURATION: 93 % | HEART RATE: 103 BPM | DIASTOLIC BLOOD PRESSURE: 59 MMHG | BODY MASS INDEX: 29.37 KG/M2 | SYSTOLIC BLOOD PRESSURE: 131 MMHG | RESPIRATION RATE: 16 BRPM | HEIGHT: 64 IN

## 2021-09-20 DIAGNOSIS — F32.A DEPRESSION, UNSPECIFIED DEPRESSION TYPE: ICD-10-CM

## 2021-09-20 DIAGNOSIS — E83.111 IRON OVERLOAD DUE TO REPEATED RED BLOOD CELL TRANSFUSIONS: ICD-10-CM

## 2021-09-20 DIAGNOSIS — D57.1 HB-SS DISEASE WITHOUT CRISIS (H): Primary | ICD-10-CM

## 2021-09-20 DIAGNOSIS — D57.00 SICKLE CELL PAIN CRISIS (H): ICD-10-CM

## 2021-09-20 DIAGNOSIS — I26.94 MULTIPLE SUBSEGMENTAL PULMONARY EMBOLI WITHOUT ACUTE COR PULMONALE (H): ICD-10-CM

## 2021-09-20 DIAGNOSIS — G47.00 INSOMNIA, UNSPECIFIED TYPE: ICD-10-CM

## 2021-09-20 DIAGNOSIS — D57.00 HB-SS DISEASE WITH CRISIS (H): ICD-10-CM

## 2021-09-20 DIAGNOSIS — M25.551 BILATERAL HIP PAIN: ICD-10-CM

## 2021-09-20 DIAGNOSIS — J45.909 ASTHMATIC BRONCHITIS WITHOUT COMPLICATION, UNSPECIFIED ASTHMA SEVERITY, UNSPECIFIED WHETHER PERSISTENT: ICD-10-CM

## 2021-09-20 DIAGNOSIS — G89.4 CHRONIC PAIN SYNDROME: ICD-10-CM

## 2021-09-20 DIAGNOSIS — K29.00 OTHER ACUTE GASTRITIS WITHOUT HEMORRHAGE: ICD-10-CM

## 2021-09-20 DIAGNOSIS — D57.00 SICKLE CELL CRISIS (H): ICD-10-CM

## 2021-09-20 DIAGNOSIS — M25.552 BILATERAL HIP PAIN: ICD-10-CM

## 2021-09-20 LAB
ANION GAP SERPL CALCULATED.3IONS-SCNC: 6 MMOL/L (ref 3–14)
BASOPHILS # BLD MANUAL: 0.5 10E3/UL (ref 0–0.2)
BASOPHILS NFR BLD MANUAL: 4 %
BUN SERPL-MCNC: 11 MG/DL (ref 7–30)
CALCIUM SERPL-MCNC: 8.5 MG/DL (ref 8.5–10.1)
CHLORIDE BLD-SCNC: 113 MMOL/L (ref 94–109)
CO2 SERPL-SCNC: 21 MMOL/L (ref 20–32)
CREAT SERPL-MCNC: 0.76 MG/DL (ref 0.52–1.04)
ELLIPTOCYTES BLD QL SMEAR: ABNORMAL
EOSINOPHIL # BLD MANUAL: 1.2 10E3/UL (ref 0–0.7)
EOSINOPHIL NFR BLD MANUAL: 9 %
ERYTHROCYTE [DISTWIDTH] IN BLOOD BY AUTOMATED COUNT: 23.9 % (ref 10–15)
FERRITIN SERPL-MCNC: 9788 NG/ML (ref 12–150)
GFR SERPL CREATININE-BSD FRML MDRD: >90 ML/MIN/1.73M2
GLUCOSE BLD-MCNC: 97 MG/DL (ref 70–99)
HCG SERPL QL: NEGATIVE
HCT VFR BLD AUTO: 20.6 % (ref 35–47)
HGB BLD-MCNC: 7.4 G/DL (ref 11.7–15.7)
LYMPHOCYTES # BLD MANUAL: 3.2 10E3/UL (ref 0.8–5.3)
LYMPHOCYTES NFR BLD MANUAL: 24 %
MCH RBC QN AUTO: 33.8 PG (ref 26.5–33)
MCHC RBC AUTO-ENTMCNC: 35.9 G/DL (ref 31.5–36.5)
MCV RBC AUTO: 94 FL (ref 78–100)
MONOCYTES # BLD MANUAL: 0.5 10E3/UL (ref 0–1.3)
MONOCYTES NFR BLD MANUAL: 4 %
NEUTROPHILS # BLD MANUAL: 7.9 10E3/UL (ref 1.6–8.3)
NEUTROPHILS NFR BLD MANUAL: 59 %
NRBC # BLD AUTO: 3.2 10E3/UL
NRBC BLD MANUAL-RTO: 24 %
PLAT MORPH BLD: ABNORMAL
PLATELET # BLD AUTO: 261 10E3/UL (ref 150–450)
POLYCHROMASIA BLD QL SMEAR: ABNORMAL
POTASSIUM BLD-SCNC: 3.6 MMOL/L (ref 3.4–5.3)
RBC # BLD AUTO: 2.19 10E6/UL (ref 3.8–5.2)
RBC MORPH BLD: ABNORMAL
RETICS # AUTO: 0.55 10E6/UL (ref 0.03–0.1)
RETICS/RBC NFR AUTO: 25.1 % (ref 0.5–2)
SICKLE CELLS BLD QL SMEAR: ABNORMAL
SODIUM SERPL-SCNC: 140 MMOL/L (ref 133–144)
TARGETS BLD QL SMEAR: ABNORMAL
WBC # BLD AUTO: 13.4 10E3/UL (ref 4–11)

## 2021-09-20 PROCEDURE — 99285 EMERGENCY DEPT VISIT HI MDM: CPT | Mod: 25

## 2021-09-20 PROCEDURE — 85045 AUTOMATED RETICULOCYTE COUNT: CPT | Performed by: EMERGENCY MEDICINE

## 2021-09-20 PROCEDURE — 999N000109 HC STATISTIC OP CR VISIT

## 2021-09-20 PROCEDURE — 99285 EMERGENCY DEPT VISIT HI MDM: CPT | Performed by: EMERGENCY MEDICINE

## 2021-09-20 PROCEDURE — 36415 COLL VENOUS BLD VENIPUNCTURE: CPT | Performed by: EMERGENCY MEDICINE

## 2021-09-20 PROCEDURE — 71045 X-RAY EXAM CHEST 1 VIEW: CPT | Mod: 26 | Performed by: RADIOLOGY

## 2021-09-20 PROCEDURE — 71045 X-RAY EXAM CHEST 1 VIEW: CPT

## 2021-09-20 PROCEDURE — 96376 TX/PRO/DX INJ SAME DRUG ADON: CPT

## 2021-09-20 PROCEDURE — 258N000003 HC RX IP 258 OP 636: Performed by: EMERGENCY MEDICINE

## 2021-09-20 PROCEDURE — 85014 HEMATOCRIT: CPT | Performed by: EMERGENCY MEDICINE

## 2021-09-20 PROCEDURE — 99214 OFFICE O/P EST MOD 30 MIN: CPT | Mod: GT | Performed by: PEDIATRICS

## 2021-09-20 PROCEDURE — 80048 BASIC METABOLIC PNL TOTAL CA: CPT | Performed by: EMERGENCY MEDICINE

## 2021-09-20 PROCEDURE — 84703 CHORIONIC GONADOTROPIN ASSAY: CPT | Performed by: EMERGENCY MEDICINE

## 2021-09-20 PROCEDURE — 250N000011 HC RX IP 250 OP 636: Performed by: EMERGENCY MEDICINE

## 2021-09-20 PROCEDURE — 96374 THER/PROPH/DIAG INJ IV PUSH: CPT

## 2021-09-20 PROCEDURE — 96361 HYDRATE IV INFUSION ADD-ON: CPT

## 2021-09-20 PROCEDURE — 96375 TX/PRO/DX INJ NEW DRUG ADDON: CPT

## 2021-09-20 RX ORDER — ALBUTEROL SULFATE 90 UG/1
2 AEROSOL, METERED RESPIRATORY (INHALATION) EVERY 6 HOURS PRN
Qty: 8.5 G | Refills: 3 | Status: SHIPPED | OUTPATIENT
Start: 2021-09-20 | End: 2022-01-17

## 2021-09-20 RX ORDER — ACETAMINOPHEN 325 MG/1
650 TABLET ORAL EVERY 6 HOURS PRN
Qty: 120 TABLET | Refills: 3 | Status: ON HOLD | OUTPATIENT
Start: 2021-09-20 | End: 2024-04-29

## 2021-09-20 RX ORDER — KETOROLAC TROMETHAMINE 15 MG/ML
15 INJECTION, SOLUTION INTRAMUSCULAR; INTRAVENOUS ONCE
Status: COMPLETED | OUTPATIENT
Start: 2021-09-20 | End: 2021-09-20

## 2021-09-20 RX ORDER — DIPHENHYDRAMINE HCL 25 MG
25 CAPSULE ORAL
Status: DISCONTINUED | OUTPATIENT
Start: 2021-09-20 | End: 2021-09-20 | Stop reason: HOSPADM

## 2021-09-20 RX ORDER — MORPHINE SULFATE 4 MG/ML
2 INJECTION, SOLUTION INTRAMUSCULAR; INTRAVENOUS
Status: COMPLETED | OUTPATIENT
Start: 2021-09-20 | End: 2021-09-20

## 2021-09-20 RX ORDER — HEPARIN SODIUM (PORCINE) LOCK FLUSH IV SOLN 100 UNIT/ML 100 UNIT/ML
100 SOLUTION INTRAVENOUS ONCE
Status: DISCONTINUED | OUTPATIENT
Start: 2021-09-20 | End: 2021-09-20

## 2021-09-20 RX ORDER — ONDANSETRON 8 MG/1
8 TABLET, FILM COATED ORAL EVERY 8 HOURS PRN
Qty: 30 TABLET | Refills: 1 | Status: SHIPPED | OUTPATIENT
Start: 2021-09-20 | End: 2021-12-20

## 2021-09-20 RX ORDER — BUDESONIDE AND FORMOTEROL FUMARATE DIHYDRATE 160; 4.5 UG/1; UG/1
2 AEROSOL RESPIRATORY (INHALATION) 2 TIMES DAILY
Qty: 10.2 G | Refills: 3 | Status: SHIPPED | OUTPATIENT
Start: 2021-09-20 | End: 2021-12-20

## 2021-09-20 RX ORDER — LIDOCAINE/PRILOCAINE 2.5 %-2.5%
CREAM (GRAM) TOPICAL ONCE
Qty: 30 G | Refills: 1 | Status: SHIPPED | OUTPATIENT
Start: 2021-09-20 | End: 2022-03-10

## 2021-09-20 RX ORDER — BUDESONIDE AND FORMOTEROL FUMARATE DIHYDRATE 160; 4.5 UG/1; UG/1
1 AEROSOL RESPIRATORY (INHALATION) EVERY EVENING
Qty: 10.2 G | Refills: 3 | Status: SHIPPED | OUTPATIENT
Start: 2021-09-20 | End: 2021-09-20

## 2021-09-20 RX ORDER — DIPHENHYDRAMINE HCL 25 MG
25-50 CAPSULE ORAL
Qty: 60 CAPSULE | Refills: 3 | Status: SHIPPED | OUTPATIENT
Start: 2021-09-20 | End: 2022-08-30

## 2021-09-20 RX ORDER — DEFERASIROX 360 MG/1
1440 TABLET, FILM COATED ORAL EVERY EVENING
Qty: 120 TABLET | Refills: 4 | Status: SHIPPED | OUTPATIENT
Start: 2021-09-20 | End: 2021-11-30

## 2021-09-20 RX ORDER — ONDANSETRON 2 MG/ML
8 INJECTION INTRAMUSCULAR; INTRAVENOUS
Status: DISCONTINUED | OUTPATIENT
Start: 2021-09-20 | End: 2021-09-20 | Stop reason: HOSPADM

## 2021-09-20 RX ORDER — ARIPIPRAZOLE 2 MG/1
2 TABLET ORAL DAILY
Qty: 30 TABLET | Refills: 3 | Status: SHIPPED | OUTPATIENT
Start: 2021-09-20 | End: 2021-12-20

## 2021-09-20 RX ORDER — HEPARIN SODIUM (PORCINE) LOCK FLUSH IV SOLN 100 UNIT/ML 100 UNIT/ML
500 SOLUTION INTRAVENOUS ONCE
Status: COMPLETED | OUTPATIENT
Start: 2021-09-20 | End: 2021-09-20

## 2021-09-20 RX ORDER — DABIGATRAN ETEXILATE 150 MG/1
150 CAPSULE ORAL 2 TIMES DAILY
Qty: 60 CAPSULE | Refills: 0 | Status: SHIPPED | OUTPATIENT
Start: 2021-09-20 | End: 2021-11-01

## 2021-09-20 RX ORDER — ALBUTEROL SULFATE 0.83 MG/ML
2.5 SOLUTION RESPIRATORY (INHALATION) EVERY 6 HOURS PRN
Qty: 12 ML | Refills: 4 | Status: SHIPPED | OUTPATIENT
Start: 2021-09-20 | End: 2021-12-20

## 2021-09-20 RX ADMIN — MORPHINE SULFATE 2 MG: 4 INJECTION INTRAVENOUS at 03:01

## 2021-09-20 RX ADMIN — KETOROLAC TROMETHAMINE 15 MG: 15 INJECTION, SOLUTION INTRAMUSCULAR; INTRAVENOUS at 03:00

## 2021-09-20 RX ADMIN — SODIUM CHLORIDE, POTASSIUM CHLORIDE, SODIUM LACTATE AND CALCIUM CHLORIDE 1000 ML: 600; 310; 30; 20 INJECTION, SOLUTION INTRAVENOUS at 03:00

## 2021-09-20 RX ADMIN — MORPHINE SULFATE 2 MG: 4 INJECTION INTRAVENOUS at 04:11

## 2021-09-20 RX ADMIN — SODIUM CHLORIDE, PRESERVATIVE FREE 500 UNITS: 5 INJECTION INTRAVENOUS at 06:25

## 2021-09-20 RX ADMIN — MORPHINE SULFATE 2 MG: 4 INJECTION INTRAVENOUS at 05:44

## 2021-09-20 ASSESSMENT — MIFFLIN-ST. JEOR: SCORE: 1525.19

## 2021-09-20 NOTE — LETTER
9/20/2021         RE: Jennifer Cervantes  [address]  [facility] [address]             Sickle Cell Outpatient Follow Up Visit    Jennifer is a 21 year old who is being evaluated via a billable video visit.      How would you like to obtain your AVS? Pradipharsalma  If the video visit is dropped, the invitation should be resent by: Text to cell phone: [phone]   Will anyone else be joining your video visit? No    Video-Visit Details    Video Start Time: 12:02 pm    Type of service:  Video Visit    Video End Time:12:33 pm  Duration: 31 minutes    Originating Location (pt. Location): Home    Distant Location (provider location):  [facility]   Platform used for Video Visit: DalloulNW      Date of encounter: Sep 20, 2021    Jennifer Cervantes is a 21 year old female who is being seen virtually for regular care and ED follow ups related to SCD      Interval History: Since our last visit, Jennifer has gone through spells of being able to stay out of the ED but still has gone pretty frequently. We discussed whether she was bothered by going to the ED or not. She said in the past she was not, because she felt like she had nursing support in the ED, but her understanding is that one of her supportive nurses, Dejuan, has perhaps left and the other one (Kilo, I believe) has been quite busy in the last few times she has gone. With those two nurses unavailable, she has been more anxious about the care she is receiving, though she did not say that there were overt problems like in the recent past. She did endorse that the infusions help for a short time and take the edge off the pain, but she does admit there is some chronic pain involved.     She has been doing well with the dabigatran and aspirin. She does need refills on most meds. She said she has gotten back on the every other week Desferal but recently got a letter from her [employer] (?) insurance saying she is now needing to pay that out of pocket,  which wasn't necessarily the case from the beginning and despite it being indicated for this clinical situation. She is still taking her deferasirox daily. She is interested in doing some opioid switching and considering MS Contin rather than Oxycontin to see if it would help more    No fevers or cough. No signs of infection. We did spend some time talking about her goals in life outside of sickle cell disease, as reported below, consistent with our new effort to focus on success outside of the medical care spectrum to see if it can affect her health positively as well. She is eager to trial this strategy.       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 She also has significant anxiety and depression with a strong anxiety about transferring to the adult clinic. She usually has pain crises secondary to the anxiety.       ---------------------------------------  Jennifer's Goals (discussed 09/19/2021 )    1-3 month goal:  Move into her own living space    6 month goal:  Enroll in college classes    12 month goal:  TBD    Disease-specific goal(s):  Reduce ED trips and utilizing success in other non-medical endeavors to help with pain management  ---------------------------------------    --------------------------------  Plan last reviewed with patient: 9/20/2021    Patient background: 22yo F, enjoys movies and kids though there are times where she does not really want to talk to people. Does not have a lot of social support at home.     Sickle Cell Disease History  Primary Hematologist: Perla  PCP: Raul  Genotype: SS  Acute Pain Crisis Treatment: (avoiding IV opioids for now,  which she has agreed to)    ER   o Oxycodone 15-20 mg x1  o Toradol 15 mg IV x1   o Maintenance IV fluids with LR  o Other: Zofran 8 mg IV PRN nausea    Inpatient:  o Home oxycodone/Oxycontin regimen, though home oxycodone dosing could be increased to 20 mg to start  o PCA plan:   - None for now  o Other Medications: Zofran  o She has had success with ketamine starting at 4mg/h and advancing only to 6mg/h, as 8mg/h made her feel quite poorly.  o ASA/Dabigatran   o Supportive Care: Docusate, Senna  Chronic Pain Medications:    Home regimen Oxycontin 10 mg q12h, oxycodone 15 mg p.o. q.4-6 hours p.r.n. breakthrough pain.  She also continues on Voltaren gel, and Zoloft among other medications.    -Also benefits from mental health visits, acupuncture  Baseline Hemoglobin: 7 g/dl without chronic transfusions  Hydroxyurea use: Yes  H/O blood transfusions: Yes, several (iron overload) Most recent 7/13/2021    H/O Transfusion Reactions: no    Antibodies:none  H/O Acute Chest Syndrome: Yes    Last episode: 10/2019     ICU/intubation: unsure  H/O Stroke: Yes (managed with chronic transfusions in the past, stopped late Spring 2020)  H/O VTE: Yes (2/2021)  H/O Cholecystectomy or Splenectomy: no  H/O Asthma, Pulm HTN, AVN, Leg Ulcers, Nephropathy, Retinopathy, etc: Iron overload, asthma, chronic lung disease, physical limitations from early stroke     -------------------------------------------    Sickle Cell Disease Comprehensive Checklist    Bone Health/Avascular Necrosis Screening/Symptoms (each visit): no new concerns today    Leg Ulcer evaluation (every visit): none    Hypertension (every visit): borderline hypertensive recently but improved later in evening and previous day on most of ED visits    Last pulmonary evaluation (asthma, AMAN, pulm HTN): within last year( due again)    Stroke/silent cerebral infarct Hx (Y/N): Yes TIA ~2014, first event ~age 1 with full stroke and R sided weakness    Last PCP Visit:  8/2020    Vaccines:  o PCV13: 5/13/19  o Pneumovax (PPSV23): 3/04, 10/09, 7/12/19 (next due 7/2024)  o Menactra: 4/2010, 9/2015 (MCV done 8/16)  o Influenza: got 20-21 vaccine per self report    Audiology (chelation): done 6/2020, normal (due again)    Past Medical History:  Past Medical History:   Diagnosis Date     Anxiety      Bleeding disorder (H)      Blood clotting disorder (H)      Cerebral infarction (H) 2015     Cognitive developmental delay     low IQ. 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 (H)     right hand contractures     Iron overload due to repeated red blood cell transfusions      Migraines      Multiple subsegmental pulmonary emboli without acute cor pulmonale (H) 02/01/2021     Oppositional defiant behavior      Superficial venous thrombosis of arm, right 03/25/2021     Uncomplicated asthma        Past Surgical History:  Past Surgical History:   Procedure Laterality Date     AS INSERT TUNNELED CV 2 CATH W/O PORT/PUMP       C BREAST REDUCTION (INCLUDES LIPO) TIER 3 Bilateral 04/23/2019     CHOLECYSTECTOMY       INSERT PORT VASCULAR ACCESS Left 4/21/2021    Procedure: INSERTION, VASCULAR ACCESS PORT (NOT SURE ON SIDE UNTIL REMOVAL);  Surgeon: Rajan More MD;  Location: [facility] OR     IR CHEST PORT PLACEMENT > 5 YRS OF AGE  4/21/2021     IR CVC NON TUNNEL LINE REMOVAL  5/6/2021     IR CVC NON TUNNEL PLACEMENT  04/07/2020     IR CVC NON TUNNEL PLACEMENT  4/30/2021     IR PORT REMOVAL LEFT  4/21/2021     REMOVE PORT VASCULAR ACCESS Left 4/21/2021    Procedure: REMOVAL, VASCULAR ACCESS PORT LEFT;  Surgeon: Rajan More MD;  Location: [facility] OR     REPAIR TENDON ELBOW Right 10/02/2019    Procedure: Right Elbow Flexor Lengthening, Flexor Pronator Slide Of Wrist and Finger, Thumb Adductor Lengthening;  Surgeon: Anai Franco MD;  Location: [facility] OR     TONSILLECTOMY  "Bilateral 10/02/2019    Procedure: Bilateral Tonsillectomy;  Surgeon: Farhana Guy MD;  Location: [facility] OR     Social History:  Social History     Socioeconomic History     Marital status: Single     Spouse name: Not on file     Number of children: Not on file     Years of education: Not on file     Highest education level: Not on file   Occupational History     Not on file   Tobacco Use     Smoking status: Never Smoker     Smokeless tobacco: Never Used   Substance and Sexual Activity     Alcohol use: Not Currently     Alcohol/week: 0.0 standard drinks     Drug use: Never     Sexual activity: Not Currently     Partners: Male     Birth control/protection: Other   Other Topics Concern     Parent/sibling w/ CABG, MI or angioplasty before 65F 55M? Not Asked   Social History Narrative    Lives with mom and extended family but \"toxic environment\" per her report. She would like to move out but cannot financially do so. She has minimal support at home despite her significant SCD comorbidities and cognitive delay from stroke.     Social Determinants of Health     Financial Resource Strain:      Difficulty of Paying Living Expenses:    Food Insecurity:      Worried About Running Out of Food in the Last Year:      Ran Out of Food in the Last Year:    Transportation Needs:      Lack of Transportation (Medical):      Lack of Transportation (Non-Medical):    Physical Activity:      Days of Exercise per Week:      Minutes of Exercise per Session:    Stress:      Feeling of Stress :    Social Connections:      Frequency of Communication with Friends and Family:      Frequency of Social Gatherings with Friends and Family:      Attends Rastafarian Services:      Active Member of Clubs or Organizations:      Attends Club or Organization Meetings:      Marital Status:    Intimate Partner Violence: Not At Risk     Fear of Current or Ex-Partner: No     Emotionally Abused: No     Physically Abused: No     Sexually Abused: No "       Medications:  Current Outpatient Medications   Medication     acetaminophen (TYLENOL) 325 MG tablet     albuterol (PROAIR HFA/PROVENTIL HFA/VENTOLIN HFA) 108 (90 Base) MCG/ACT inhaler     albuterol (PROVENTIL) (2.5 MG/3ML) 0.083% neb solution     ARIPiprazole (ABILIFY) 2 MG tablet     aspirin (ASA) 81 MG chewable tablet     budesonide-formoterol (SYMBICORT) 160-4.5 MCG/ACT Inhaler     dabigatran ANTICOAGULANT (PRADAXA) 150 MG capsule     diclofenac (VOLTAREN) 1 % topical gel     diphenhydrAMINE (BENADRYL) 25 MG capsule     DULoxetine (CYMBALTA) 30 MG capsule     EPINEPHrine (ANY BX GENERIC EQUIV) 0.3 MG/0.3ML injection 2-pack     Hydroxyurea 1000 MG TABS     hydrOXYzine (ATARAX) 25 MG tablet     JADENU 360 MG tablet     lidocaine-prilocaine (EMLA) 2.5-2.5 % external cream     medroxyPROGESTERone (DEPO-PROVERA) 150 MG/ML IM injection     naloxone (NARCAN) 4 MG/0.1ML nasal spray     omeprazole (PRILOSEC) 20 MG DR capsule     ondansetron (ZOFRAN) 8 MG tablet     oxyCODONE (OXYCONTIN) 10 MG 12 hr tablet     oxyCODONE IR (ROXICODONE) 15 MG tablet     Current Facility-Administered Medications   Medication     medroxyPROGESTERone (DEPO-PROVERA) syringe 150 mg         Physical Exam:   There were no vitals taken for this visit.     GEN: young  female, calm and collected today, in a good mood and quite engaged  HEENT: slight icterus, MMM  RESP: clear to auscultation  SKIN: no bruising noted  NEURO: alert and oriented, right wrist contracture stable. Gait antalgic but stable      Labs:   Results for HIMANSHU AL (MRN [id number]) as of 9/21/2021 10:36   Ref. Range 9/20/2021 03:01   Sodium Latest Ref Range: 133 - 144 mmol/L 140   Potassium Latest Ref Range: 3.4 - 5.3 mmol/L 3.6   Chloride Latest Ref Range: 94 - 109 mmol/L 113 (H)   Carbon Dioxide Latest Ref Range: 20 - 32 mmol/L 21   Urea Nitrogen Latest Ref Range: 7 - 30 mg/dL 11   Creatinine Latest Ref Range: 0.52 - 1.04 mg/dL 0.76   GFR Estimate Latest  Ref Range: >60 mL/min/1.73m2 >90   Calcium Latest Ref Range: 8.5 - 10.1 mg/dL 8.5   Anion Gap Latest Ref Range: 3 - 14 mmol/L 6   HCG Qualitative Serum Latest Ref Range: Negative  Negative   Glucose Latest Ref Range: 70 - 99 mg/dL 97   WBC Latest Ref Range: 4.0 - 11.0 10e3/uL 13.4 (H)   Hemoglobin Latest Ref Range: 11.7 - 15.7 g/dL 7.4 (L)   Hematocrit Latest Ref Range: 35.0 - 47.0 % 20.6 (L)   Platelet Count Latest Ref Range: 150 - 450 10e3/uL 261   RBC Count Latest Ref Range: 3.80 - 5.20 10e6/uL 2.19 (L)   MCV Latest Ref Range: 78 - 100 fL 94   MCH Latest Ref Range: 26.5 - 33.0 pg 33.8 (H)   MCHC Latest Ref Range: 31.5 - 36.5 g/dL 35.9   RDW Latest Ref Range: 10.0 - 15.0 % 23.9 (H)   % Neutrophils Latest Units: % 59   % Lymphocytes Latest Units: % 24   % Monocytes Latest Units: % 4   % Eosinophils Latest Units: % 9   Absolute Basophils Latest Ref Range: 0.0 - 0.2 10e3/uL 0.5 (H)   % Basophils Latest Units: % 4   NRBC/W Latest Ref Range: <=0 % 24 (H)   Absolute Neutrophil Latest Ref Range: 1.6 - 8.3 10e3/uL 7.9   Absolute Lymphocytes Latest Ref Range: 0.8 - 5.3 10e3/uL 3.2   Absolute Monocytes Latest Ref Range: 0.0 - 1.3 10e3/uL 0.5   Absolute Eosinophils Latest Ref Range: 0.0 - 0.7 10e3/uL 1.2 (H)   Absolute NRBCs Latest Ref Range: <=0.0 10e3/uL 3.2 (H)   RBC Morphology Unknown Confirmed RBC Indices   Platelet Morphology Latest Ref Range: Automated Count Confirmed. Platelet morphology is normal.  Automated Count Confirmed. Platelet morphology is normal.   Polychromasia Latest Ref Range: None Seen  Moderate (A)   Sickle Cells Latest Ref Range: None Seen  Marked (A)   Target Cells Latest Ref Range: None Seen  Moderate (A)   Elliptocytes Latest Ref Range: None Seen  Moderate (A)   % Retic Latest Ref Range: 0.5 - 2.0 % 25.1 (H)   Absolute Retic Latest Ref Range: 0.025 - 0.095 10e6/uL 0.551 (H)     Imaging:   Ferriscan 8/18/21  History of sickle cell disease with chronic transfusions and extreme iron overload,  currently on medication. Hemachromatosis due to  repeated. Blood cell transfusions.     COMPARISON: 3/25/2021     TECHNIQUE:     Sequential non-contrast spin-echo MRI imaging obtained of the liver with TR 2500 msec and progressively longer Miley up to 18 msec.     FINDINGS:     Average liver iron concentration is 32.5 mg/g dry tissue (normal = 0.17 - 1.8) previously 43  Average liver iron concentration is 581 mmol/kg dry tissue (normal = 3 - 33)     Other findings: Markedly increased iron deposition in the spleen on subjective evaluation.                                                                      IMPRESSION: Increased iron concentration in the liver and spleen.     I have personally reviewed the examination and initial interpretation and I agree with the findings.     DAYNA PAUL MD   ----------    Assessment:  Jennifer Cervantes is a 21 year old female with HbSS. Her disease has been complicated by stroke leading to some slight cognitive delay and right sided hemiparesis, high anxiety leading to frequent pain crises on top of chronic pain syndrome, poor social structure at home with no real support, and iron overload secondary to repeated transfusions.    She is doing much better today. We spoke a good bit about her current approach to pain medications and some trial options for alternative methods. We agreed that she is fine going to the ED but we will forego IV infusions for now (it is okay to get them if coming to infusion). She is concerned about a lack of familiar friendly faces in the ED. We will work to manage her at home and I plan to call at least once a week to see how things are going. She is due for a cardiology appointment tomorrow. We also need to square away her Desferal, which is indicated and thankfully is showing excellent response, to ensure it is covered by insurance.    Crizanlizumab is due Wed    Major Topics of the Visit:  1. Pain Management: Updated pain plan. Only PO opioids in  ED    -Crizanlizumab monthly (started June 2021)   2. Iron overload/Pharmacy Issues: She had Jadenu ordered a few months ago but there have been difficulties getting it at refill time. She is now on Desferal as well. LIC was 43 (3/2021) so we need to act urgently. Desferal HD over 48 hours is going well. Synthetic function for the liver seems to be intact  Ferrharshan in December Needs audiology follow up as well, last done June 2020.  3. High RBC turnover: Jennifer really has a high degree of RBC turnover, more than most patients I have seen. Oxybryta led to more frequent pain episodes (chest pain, which was new) and did not change the RBC turnover so it was stopped in December. No change currently  4. Pulmonary Emboli + new RUE DVT (innominate vein) of unclear chronicity + new symptomatic R cephalic SVT this week: Now on dabigatran after having DVT/PE while on apixaban and rivaroxaban. Her levels have been therapeutic so it is odd that her metabolism of drugs is less predictable. We did start her back on ASA. She may have some pulmonary HTN with her recurrent chest discomfort and known PEs. Back on ASA  5. Stroke sequelae on right side: Follow up with  PM&R as needed  Follow up: Virtual with Andrei Machado in 2 weeks    Review of inpatient care as documented above   Review of the result(s) of each unique test - labs as above  Diagnosis or treatment significantly limited by social determinants of health - sickle cell disease, racial inequity, difficult social situation at home  Ordering of specific tests      Eric Duncan MD  Hematologist  Division of Hematology, Oncology, and Transplantation  [facility]  [facility]  Pager: [phone]                Eric Duncan MD

## 2021-09-20 NOTE — PROGRESS NOTES
Per discussion with provider this morning, pt was in ED overnight and still in ED at time of discussion. Provider wants pt to keep her noon apt in person and not switch to virtual or phone. Before writer could call pt, she had discharged and called the clinic to switch visit to virtual.    Writer called pt and reached , lmcb that she should plan to come to apt in person, her ride should have been set up as apt had been scheduled since 6/17/21 unless she already called insurance to cancel it. As it is only 9am, if she calls back triage, SW or writer can help her set up another ride. Strongly encouraged her to come to visit in person so she and provider can discuss ongoing plan for pain control.

## 2021-09-20 NOTE — PROGRESS NOTES
Sickle Cell Outpatient Follow Up Visit    Jennifer is a 21 year old who is being evaluated via a billable video visit.      How would you like to obtain your AVS? Pradiphart  If the video visit is dropped, the invitation should be resent by: Text to cell phone: [phone]   Will anyone else be joining your video visit? No    Video-Visit Details    Video Start Time: 12:02 pm    Type of service:  Video Visit    Video End Time:12:33 pm  Duration: 31 minutes    Originating Location (pt. Location): Home    Distant Location (provider location):  [facility]   Platform used for Video Visit: MaestroDev      Date of encounter: Sep 20, 2021    Jennifer Cervantes is a 21 year old female who is being seen virtually for regular care and ED follow ups related to SCD      Interval History: Since our last visit, Jennifer has gone through spells of being able to stay out of the ED but still has gone pretty frequently. We discussed whether she was bothered by going to the ED or not. She said in the past she was not, because she felt like she had nursing support in the ED, but her understanding is that one of her supportive nurses, Dejuan, has perhaps left and the other one (Kilo, I believe) has been quite busy in the last few times she has gone. With those two nurses unavailable, she has been more anxious about the care she is receiving, though she did not say that there were overt problems like in the recent past. She did endorse that the infusions help for a short time and take the edge off the pain, but she does admit there is some chronic pain involved.     She has been doing well with the dabigatran and aspirin. She does need refills on most meds. She said she has gotten back on the every other week Desferal but recently got a letter from her [employer] (?) insurance saying she is now needing to pay that out of pocket, which wasn't necessarily the case from the beginning and despite it being indicated for  this clinical situation. She is still taking her deferasirox daily. She is interested in doing some opioid switching and considering MS Contin rather than Oxycontin to see if it would help more    No fevers or cough. No signs of infection. We did spend some time talking about her goals in life outside of sickle cell disease, as reported below, consistent with our new effort to focus on success outside of the medical care spectrum to see if it can affect her health positively as well. She is eager to trial this strategy.       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 She also has significant anxiety and depression with a strong anxiety about transferring to the adult clinic. She usually has pain crises secondary to the anxiety.       ---------------------------------------  Jennifer's Goals (discussed 09/19/2021 )    1-3 month goal:  Move into her own living space    6 month goal:  Enroll in college classes    12 month goal:  TBD    Disease-specific goal(s):  Reduce ED trips and utilizing success in other non-medical endeavors to help with pain management  ---------------------------------------    --------------------------------  Plan last reviewed with patient: 9/20/2021    Patient background: 20yo F, enjoys movies and kids though there are times where she does not really want to talk to people. Does not have a lot of social support at home.     Sickle Cell Disease History  Primary Hematologist: Perla  PCP: Raul  Genotype: SS  Acute Pain Crisis Treatment: (avoiding IV opioids for now, which she has agreed to)    ER   o Oxycodone 15-20 mg x1  o Toradol 15 mg IV x1    o Maintenance IV fluids with LR  o Other: Zofran 8 mg IV PRN nausea    Inpatient:  o Home oxycodone/Oxycontin regimen, though home oxycodone dosing could be increased to 20 mg to start  o PCA plan:   - None for now  o Other Medications: Zofran  o She has had success with ketamine starting at 4mg/h and advancing only to 6mg/h, as 8mg/h made her feel quite poorly.  o ASA/Dabigatran   o Supportive Care: Docusate, Senna  Chronic Pain Medications:    Home regimen Oxycontin 10 mg q12h, oxycodone 15 mg p.o. q.4-6 hours p.r.n. breakthrough pain.  She also continues on Voltaren gel, and Zoloft among other medications.    -Also benefits from mental health visits, acupuncture  Baseline Hemoglobin: 7 g/dl without chronic transfusions  Hydroxyurea use: Yes  H/O blood transfusions: Yes, several (iron overload) Most recent 7/13/2021    H/O Transfusion Reactions: no    Antibodies:none  H/O Acute Chest Syndrome: Yes    Last episode: 10/2019     ICU/intubation: unsure  H/O Stroke: Yes (managed with chronic transfusions in the past, stopped late Spring 2020)  H/O VTE: Yes (2/2021)  H/O Cholecystectomy or Splenectomy: no  H/O Asthma, Pulm HTN, AVN, Leg Ulcers, Nephropathy, Retinopathy, etc: Iron overload, asthma, chronic lung disease, physical limitations from early stroke     -------------------------------------------    Sickle Cell Disease Comprehensive Checklist    Bone Health/Avascular Necrosis Screening/Symptoms (each visit): no new concerns today    Leg Ulcer evaluation (every visit): none    Hypertension (every visit): borderline hypertensive recently but improved later in evening and previous day on most of ED visits    Last pulmonary evaluation (asthma, AMAN, pulm HTN): within last year( due again)    Stroke/silent cerebral infarct Hx (Y/N): Yes TIA ~2014, first event ~age 1 with full stroke and R sided weakness    Last PCP Visit: 8/2020    Vaccines:  o PCV13: 5/13/19  o Pneumovax (PPSV23): 3/04, 10/09, 7/12/19 (next due  7/2024)  o Menactra: 4/2010, 9/2015 (MCV done 8/16)  o Influenza: got 20-21 vaccine per self report    Audiology (chelation): done 6/2020, normal (due again)    Past Medical History:  Past Medical History:   Diagnosis Date     Anxiety      Bleeding disorder (H)      Blood clotting disorder (H)      Cerebral infarction (H) 2015     Cognitive developmental delay     low IQ. 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 (H)     right hand contractures     Iron overload due to repeated red blood cell transfusions      Migraines      Multiple subsegmental pulmonary emboli without acute cor pulmonale (H) 02/01/2021     Oppositional defiant behavior      Superficial venous thrombosis of arm, right 03/25/2021     Uncomplicated asthma        Past Surgical History:  Past Surgical History:   Procedure Laterality Date     AS INSERT TUNNELED CV 2 CATH W/O PORT/PUMP       C BREAST REDUCTION (INCLUDES LIPO) TIER 3 Bilateral 04/23/2019     CHOLECYSTECTOMY       INSERT PORT VASCULAR ACCESS Left 4/21/2021    Procedure: INSERTION, VASCULAR ACCESS PORT (NOT SURE ON SIDE UNTIL REMOVAL);  Surgeon: Rajan More MD;  Location: [facility] OR     IR CHEST PORT PLACEMENT > 5 YRS OF AGE  4/21/2021     IR CVC NON TUNNEL LINE REMOVAL  5/6/2021     IR CVC NON TUNNEL PLACEMENT  04/07/2020     IR CVC NON TUNNEL PLACEMENT  4/30/2021     IR PORT REMOVAL LEFT  4/21/2021     REMOVE PORT VASCULAR ACCESS Left 4/21/2021    Procedure: REMOVAL, VASCULAR ACCESS PORT LEFT;  Surgeon: Rajan More MD;  Location: [facility] OR     REPAIR TENDON ELBOW Right 10/02/2019    Procedure: Right Elbow Flexor Lengthening, Flexor Pronator Slide Of Wrist and Finger, Thumb Adductor Lengthening;  Surgeon: Anai Franco MD;  Location: [facility] OR     TONSILLECTOMY Bilateral 10/02/2019    Procedure: Bilateral Tonsillectomy;  Surgeon: Farhana Guy  "MD Ruchi;  Location: [facility] OR     Social History:  Social History     Socioeconomic History     Marital status: Single     Spouse name: Not on file     Number of children: Not on file     Years of education: Not on file     Highest education level: Not on file   Occupational History     Not on file   Tobacco Use     Smoking status: Never Smoker     Smokeless tobacco: Never Used   Substance and Sexual Activity     Alcohol use: Not Currently     Alcohol/week: 0.0 standard drinks     Drug use: Never     Sexual activity: Not Currently     Partners: Male     Birth control/protection: Other   Other Topics Concern     Parent/sibling w/ CABG, MI or angioplasty before 65F 55M? Not Asked   Social History Narrative    Lives with mom and extended family but \"toxic environment\" per her report. She would like to move out but cannot financially do so. She has minimal support at home despite her significant SCD comorbidities and cognitive delay from stroke.     Social Determinants of Health     Financial Resource Strain:      Difficulty of Paying Living Expenses:    Food Insecurity:      Worried About Running Out of Food in the Last Year:      Ran Out of Food in the Last Year:    Transportation Needs:      Lack of Transportation (Medical):      Lack of Transportation (Non-Medical):    Physical Activity:      Days of Exercise per Week:      Minutes of Exercise per Session:    Stress:      Feeling of Stress :    Social Connections:      Frequency of Communication with Friends and Family:      Frequency of Social Gatherings with Friends and Family:      Attends Yarsanism Services:      Active Member of Clubs or Organizations:      Attends Club or Organization Meetings:      Marital Status:    Intimate Partner Violence: Not At Risk     Fear of Current or Ex-Partner: No     Emotionally Abused: No     Physically Abused: No     Sexually Abused: No       Medications:  Current Outpatient Medications   Medication     acetaminophen (TYLENOL) 325 " MG tablet     albuterol (PROAIR HFA/PROVENTIL HFA/VENTOLIN HFA) 108 (90 Base) MCG/ACT inhaler     albuterol (PROVENTIL) (2.5 MG/3ML) 0.083% neb solution     ARIPiprazole (ABILIFY) 2 MG tablet     aspirin (ASA) 81 MG chewable tablet     budesonide-formoterol (SYMBICORT) 160-4.5 MCG/ACT Inhaler     dabigatran ANTICOAGULANT (PRADAXA) 150 MG capsule     diclofenac (VOLTAREN) 1 % topical gel     diphenhydrAMINE (BENADRYL) 25 MG capsule     DULoxetine (CYMBALTA) 30 MG capsule     EPINEPHrine (ANY BX GENERIC EQUIV) 0.3 MG/0.3ML injection 2-pack     Hydroxyurea 1000 MG TABS     hydrOXYzine (ATARAX) 25 MG tablet     JADENU 360 MG tablet     lidocaine-prilocaine (EMLA) 2.5-2.5 % external cream     medroxyPROGESTERone (DEPO-PROVERA) 150 MG/ML IM injection     naloxone (NARCAN) 4 MG/0.1ML nasal spray     omeprazole (PRILOSEC) 20 MG DR capsule     ondansetron (ZOFRAN) 8 MG tablet     oxyCODONE (OXYCONTIN) 10 MG 12 hr tablet     oxyCODONE IR (ROXICODONE) 15 MG tablet     Current Facility-Administered Medications   Medication     medroxyPROGESTERone (DEPO-PROVERA) syringe 150 mg         Physical Exam:   There were no vitals taken for this visit.     GEN: young  female, calm and collected today, in a good mood and quite engaged  HEENT: slight icterus, MMM  RESP: clear to auscultation  SKIN: no bruising noted  NEURO: alert and oriented, right wrist contracture stable. Gait antalgic but stable      Labs:   Results for HIMANSHU AL (MRN [id number]) as of 9/21/2021 10:36   Ref. Range 9/20/2021 03:01   Sodium Latest Ref Range: 133 - 144 mmol/L 140   Potassium Latest Ref Range: 3.4 - 5.3 mmol/L 3.6   Chloride Latest Ref Range: 94 - 109 mmol/L 113 (H)   Carbon Dioxide Latest Ref Range: 20 - 32 mmol/L 21   Urea Nitrogen Latest Ref Range: 7 - 30 mg/dL 11   Creatinine Latest Ref Range: 0.52 - 1.04 mg/dL 0.76   GFR Estimate Latest Ref Range: >60 mL/min/1.73m2 >90   Calcium Latest Ref Range: 8.5 - 10.1 mg/dL 8.5   Anion Gap  Latest Ref Range: 3 - 14 mmol/L 6   HCG Qualitative Serum Latest Ref Range: Negative  Negative   Glucose Latest Ref Range: 70 - 99 mg/dL 97   WBC Latest Ref Range: 4.0 - 11.0 10e3/uL 13.4 (H)   Hemoglobin Latest Ref Range: 11.7 - 15.7 g/dL 7.4 (L)   Hematocrit Latest Ref Range: 35.0 - 47.0 % 20.6 (L)   Platelet Count Latest Ref Range: 150 - 450 10e3/uL 261   RBC Count Latest Ref Range: 3.80 - 5.20 10e6/uL 2.19 (L)   MCV Latest Ref Range: 78 - 100 fL 94   MCH Latest Ref Range: 26.5 - 33.0 pg 33.8 (H)   MCHC Latest Ref Range: 31.5 - 36.5 g/dL 35.9   RDW Latest Ref Range: 10.0 - 15.0 % 23.9 (H)   % Neutrophils Latest Units: % 59   % Lymphocytes Latest Units: % 24   % Monocytes Latest Units: % 4   % Eosinophils Latest Units: % 9   Absolute Basophils Latest Ref Range: 0.0 - 0.2 10e3/uL 0.5 (H)   % Basophils Latest Units: % 4   NRBC/W Latest Ref Range: <=0 % 24 (H)   Absolute Neutrophil Latest Ref Range: 1.6 - 8.3 10e3/uL 7.9   Absolute Lymphocytes Latest Ref Range: 0.8 - 5.3 10e3/uL 3.2   Absolute Monocytes Latest Ref Range: 0.0 - 1.3 10e3/uL 0.5   Absolute Eosinophils Latest Ref Range: 0.0 - 0.7 10e3/uL 1.2 (H)   Absolute NRBCs Latest Ref Range: <=0.0 10e3/uL 3.2 (H)   RBC Morphology Unknown Confirmed RBC Indices   Platelet Morphology Latest Ref Range: Automated Count Confirmed. Platelet morphology is normal.  Automated Count Confirmed. Platelet morphology is normal.   Polychromasia Latest Ref Range: None Seen  Moderate (A)   Sickle Cells Latest Ref Range: None Seen  Marked (A)   Target Cells Latest Ref Range: None Seen  Moderate (A)   Elliptocytes Latest Ref Range: None Seen  Moderate (A)   % Retic Latest Ref Range: 0.5 - 2.0 % 25.1 (H)   Absolute Retic Latest Ref Range: 0.025 - 0.095 10e6/uL 0.551 (H)     Imaging:   Ferriscan 8/18/21  History of sickle cell disease with chronic transfusions and extreme iron overload, currently on medication. Hemachromatosis due to  repeated. Blood cell transfusions.     COMPARISON:  3/25/2021     TECHNIQUE:     Sequential non-contrast spin-echo MRI imaging obtained of the liver with TR 2500 msec and progressively longer Miley up to 18 msec.     FINDINGS:     Average liver iron concentration is 32.5 mg/g dry tissue (normal = 0.17 - 1.8) previously 43  Average liver iron concentration is 581 mmol/kg dry tissue (normal = 3 - 33)     Other findings: Markedly increased iron deposition in the spleen on subjective evaluation.                                                                      IMPRESSION: Increased iron concentration in the liver and spleen.     I have personally reviewed the examination and initial interpretation and I agree with the findings.     DAYNA PAUL MD   ----------    Assessment:  Jennifer Cervantes is a 21 year old female with HbSS. Her disease has been complicated by stroke leading to some slight cognitive delay and right sided hemiparesis, high anxiety leading to frequent pain crises on top of chronic pain syndrome, poor social structure at home with no real support, and iron overload secondary to repeated transfusions.    She is doing much better today. We spoke a good bit about her current approach to pain medications and some trial options for alternative methods. We agreed that she is fine going to the ED but we will forego IV infusions for now (it is okay to get them if coming to infusion). She is concerned about a lack of familiar friendly faces in the ED. We will work to manage her at home and I plan to call at least once a week to see how things are going. She is due for a cardiology appointment tomorrow. We also need to square away her Desferal, which is indicated and thankfully is showing excellent response, to ensure it is covered by insurance.    Crizanlizumab is due Wed    Major Topics of the Visit:  1. Pain Management: Updated pain plan. Only PO opioids in ED    -Crizanlizumab monthly (started June 2021)   2. Iron overload/Pharmacy Issues: She had Jadenu  ordered a few months ago but there have been difficulties getting it at refill time. She is now on Desferal as well. LIC was 43 (3/2021) so we need to act urgently. Desferal HD over 48 hours is going well. Synthetic function for the liver seems to be intact  Brenda in December Needs audiology follow up as well, last done June 2020.  3. High RBC turnover: Jennifer really has a high degree of RBC turnover, more than most patients I have seen. Oxybryta led to more frequent pain episodes (chest pain, which was new) and did not change the RBC turnover so it was stopped in December. No change currently  4. Pulmonary Emboli + new RUE DVT (innominate vein) of unclear chronicity + new symptomatic R cephalic SVT this week: Now on dabigatran after having DVT/PE while on apixaban and rivaroxaban. Her levels have been therapeutic so it is odd that her metabolism of drugs is less predictable. We did start her back on ASA. She may have some pulmonary HTN with her recurrent chest discomfort and known PEs. Back on ASA  5. Stroke sequelae on right side: Follow up with  PM&R as needed  Follow up: Virtual with Andrei Machado in 2 weeks    Review of inpatient care as documented above   Review of the result(s) of each unique test - labs as above  Diagnosis or treatment significantly limited by social determinants of health - sickle cell disease, racial inequity, difficult social situation at home  Ordering of specific tests      Eric Duncan MD  Hematologist  Division of Hematology, Oncology, and Transplantation  [facility]  [facility]  Pager: [phone]

## 2021-09-20 NOTE — ED TRIAGE NOTES
Jennifer comes to the ED tonight for evaluation of 6 to 7 hours of bilateral hip and low back pain that she attributes to sickle cell disease.  No relief of pain with home medications.  Also states she has some intermittent SOB.

## 2021-09-20 NOTE — DISCHARGE INSTRUCTIONS
Instructions from your doctor today:  Emergency Department testing is focused on the potential causes of your symptoms that are the most dangerous possibilities, and cannot cover every possibility. Based on the evaluation, it was deemed sufficiently safe to discharge and continue management through the clinics. Thus, follow-up is very important to assess for improvement/worsening, potential further testing, and potential treatment adjustments. If you were given opioid pain medications or other medications that can make you drowsy while in the ED, you should not drive for at least several hours and not until you feel completely back to normal.     Please make an appointment to follow up with:  - Hematology Oncology Clinic (phone: [phone]) in 1-2 days  - If you do not have a primary care provider, you can be seen in follow-up and establish care by calling any of the clinics below:     - Primary [facility] Center (phone: [phone])     - Primary Care / [facility] (phone: [phone])   - Have your clinic provider review the results from today's visit with you again, including any potential follow-up or additional testing that may be needed based on the results. Occasionally, incidental findings are found on later review by radiologists that may need follow-up.     Return to the Emergency Department immediately if you have worsening symptoms, or any other urgent or potentially life-threatening concerns.

## 2021-09-20 NOTE — ED PROVIDER NOTES
History     Chief Complaint   Patient presents with     Back Pain     Hip Pain     Sickle Cell Pain Crisis     Shortness of Breath     HPI  Jennifer Cervantes is a 21 year old female with PMH notable for sickle cell anemia who presents to the ED with hip pain.  Pain started about 7 hours prior to arrival in the ED.  Patient reports bilateral hips with aching quality pain, constant.  Pain not aggravated by movement.  Also with continued thoracic back pain which has been present for the past couple days.  Mildly short of breath.  No cough nor fever.      Past Medical History  Past Medical History:   Diagnosis Date     Anxiety      Bleeding disorder (H)      Blood clotting disorder (H)      Cerebral infarction (H) 2015     Cognitive developmental delay     low IQ. 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 (H)     right hand contractures     Iron overload due to repeated red blood cell transfusions      Migraines      Multiple subsegmental pulmonary emboli without acute cor pulmonale (H) 02/01/2021     Oppositional defiant behavior      Superficial venous thrombosis of arm, right 03/25/2021     Uncomplicated asthma      Past Surgical History:   Procedure Laterality Date     AS INSERT TUNNELED CV 2 CATH W/O PORT/PUMP       C BREAST REDUCTION (INCLUDES LIPO) TIER 3 Bilateral 04/23/2019     CHOLECYSTECTOMY       INSERT PORT VASCULAR ACCESS Left 4/21/2021    Procedure: INSERTION, VASCULAR ACCESS PORT (NOT SURE ON SIDE UNTIL REMOVAL);  Surgeon: Rajan More MD;  Location: [facility] OR     IR CHEST PORT PLACEMENT > 5 YRS OF AGE  4/21/2021     IR CVC NON TUNNEL LINE REMOVAL  5/6/2021     IR CVC NON TUNNEL PLACEMENT  04/07/2020     IR CVC NON TUNNEL PLACEMENT  4/30/2021     IR PORT REMOVAL LEFT  4/21/2021     REMOVE PORT VASCULAR ACCESS Left 4/21/2021    Procedure: REMOVAL, VASCULAR ACCESS PORT LEFT;   Surgeon: Rajan More MD;  Location: [facility] OR     REPAIR TENDON ELBOW Right 10/02/2019    Procedure: Right Elbow Flexor Lengthening, Flexor Pronator Slide Of Wrist and Finger, Thumb Adductor Lengthening;  Surgeon: Anai Franco MD;  Location: [facility] OR     TONSILLECTOMY Bilateral 10/02/2019    Procedure: Bilateral Tonsillectomy;  Surgeon: Farhana Guy MD;  Location: [facility] OR     acetaminophen (TYLENOL) 325 MG tablet  albuterol (PROAIR HFA/PROVENTIL HFA/VENTOLIN HFA) 108 (90 Base) MCG/ACT inhaler  albuterol (PROVENTIL) (2.5 MG/3ML) 0.083% neb solution  ARIPiprazole (ABILIFY) 2 MG tablet  aspirin (ASA) 81 MG chewable tablet  budesonide-formoterol (SYMBICORT) 160-4.5 MCG/ACT Inhaler  dabigatran ANTICOAGULANT (PRADAXA) 150 MG capsule  diclofenac (VOLTAREN) 1 % topical gel  diphenhydrAMINE (BENADRYL) 25 MG capsule  DULoxetine (CYMBALTA) 30 MG capsule  EPINEPHrine (ANY BX GENERIC EQUIV) 0.3 MG/0.3ML injection 2-pack  Hydroxyurea 1000 MG TABS  hydrOXYzine (ATARAX) 25 MG tablet  JADENU 360 MG tablet  lidocaine-prilocaine (EMLA) 2.5-2.5 % external cream  medroxyPROGESTERone (DEPO-PROVERA) 150 MG/ML IM injection  naloxone (NARCAN) 4 MG/0.1ML nasal spray  omeprazole (PRILOSEC) 20 MG DR capsule  ondansetron (ZOFRAN) 8 MG tablet  oxyCODONE (OXYCONTIN) 10 MG 12 hr tablet  oxyCODONE IR (ROXICODONE) 15 MG tablet      Allergies   Allergen Reactions     Contrast Dye      Hives and breathing issues     Fish-Derived Products Hives     Seafood Hives     Diagnostic X-Ray Materials      Gadolinium      Family History  Family History   Problem Relation Age of Onset     Sickle Cell Trait Mother      Hypertension Mother      Asthma Mother      Sickle Cell Trait Father      Social History   Social History     Tobacco Use     Smoking status: Never Smoker     Smokeless tobacco: Never Used   Substance Use Topics     Alcohol use: Not Currently     Alcohol/week: 0.0 standard drinks     Drug use: Never      Past medical history,  "past surgical history, medications, allergies, family history, and social history were reviewed with the patient. No additional pertinent items.      Review of Systems  A complete review of systems was performed with pertinent positives and negatives noted in the HPI, and all other systems negative.    Physical Exam   BP: (!) 142/80  Pulse: 103  Temp: 98.6  F (37  C)  Resp: 16  Height: 162.6 cm (5' 4\")  Weight: 78 kg (172 lb)  SpO2: 91 %    Physical Exam  General: no acute distress. Appears stated age.   HENT: MMM, no oropharyngeal lesions  Eyes: PERRL, normal sclerae  Cardio: regular rate. Regular rhythm. Extremities well perfused  Resp: Normal work of breathing, normal respiratory rate, CTA.  Chest/Back: no visual signs of trauma, no CVA tenderness  Abdomen: no tenderness, non-distended, no rebound, no guarding  Neuro: alert and fully oriented. CN II-XII grossly intact. Grossly normal strength and sensation in all extremities.   MSK: Bilateral hips with no pain with range of motion, no pain with palpation.  No deformities. Grossly normal ROM in extremities.   Integumentary/Skin: no rash visualized, normal color  Psych: normal affect, normal behavior    ED Course      Procedures          Labs Ordered and Resulted from Time of ED Arrival Up to the Time of Departure from the ED   RETICULOCYTE COUNT - Abnormal; Notable for the following components:       Result Value    % Reticulocyte 25.1 (*)     Absolute Reticulocyte 0.551 (*)     All other components within normal limits   BASIC METABOLIC PANEL - Abnormal; Notable for the following components:    Chloride 113 (*)     All other components within normal limits   CBC WITH PLATELETS AND DIFFERENTIAL - Abnormal; Notable for the following components:    WBC Count 13.4 (*)     RBC Count 2.19 (*)     Hemoglobin 7.4 (*)     Hematocrit 20.6 (*)     MCH 33.8 (*)     RDW 23.9 (*)     All other components within normal limits   DIFFERENTIAL - Abnormal; Notable for the following " components:    NRBCs per 100 WBC 24 (*)     Absolute Eosinophils 1.2 (*)     Absolute Basophils 0.5 (*)     Absolute NRBCs 3.2 (*)     Elliptocytes Moderate (*)     Polychromasia Moderate (*)     Sickle Cells Marked (*)     Target Cells Moderate (*)     All other components within normal limits   HCG QUALITATIVE PREGNANCY - Normal   CBC WITH PLATELETS & DIFFERENTIAL    Narrative:     The following orders were created for panel order CBC with Platelets & Differential.  Procedure                               Abnormality         Status                     ---------                               -----------         ------                     CBC with platelets and d...[[id number]]  Abnormal            Final result               Manual Differential[[id number]]          Abnormal            Final result                 Please view results for these tests on the individual orders.     XR Chest 1 View   Final Result   IMPRESSION: No convincing evidence of active cardiopulmonary disease.              Assessments & Plan (with Medical Decision Making)   Patient presenting with bilateral hip pain in the context of sickle cell disease.  Patient also noting mild shortness of breath and thoracic back pain that had similar quality as other sickle flares and has been present for a few days. Vitals in the ED with initial borderline tachycardia that was not present at time of exam. Nursing notes reviewed.     Hemoglobin 7.4, near baseline, retake appropriately elevated. Electrolytes unremarkable. Pregnancy negative. X-ray without evidence of acute chest.    In the ED, the patient's symptoms were managed with ketorolac and morphine in line with her established ED care plan, with improvement in symptoms upon reassessment. Patient then requesting discharge home.    The complete clinical picture is most consistent with sickle cell pain flare. After counseling on the diagnosis, work-up, and treatment plan, the patient was discharged to home.  The patient was advised to follow-up with hematology in 1 to 2 days. The patient was advised to return to the ED if worsening symptoms, or if there are any urgent/life-threatening concerns.     Final diagnoses:   Sickle cell pain crisis (H)   Bilateral hip pain     Discharge Medication List as of 9/20/2021  6:23 AM          --  Huang Domingo MD   Emergency Medicine   [facility] EMERGENCY DEPARTMENT  9/20/2021     Huang Domingo MD  09/21/21 [id number]

## 2021-09-20 NOTE — TELEPHONE ENCOUNTER
New Pulmonary Hypertension Patient Form   Patient Name: Jennifer Cervantes   Age: 21     Referring Provider:Jennifer Ty PA-C   MRN: [id number]     Date Test Epic Media/Scan Care Everywhere    [facility] ?  ?   ?     Angio/Stress ?  ?   ?    8/29/21 Echo  ?   ?   ?    9/17/21 EKG  ?   ?   ?     6MWT ?   ?   ?    5/13/19 PFT  ?   ?   ?     Sleep Study ?  ?   ?     Chest CT ?  ?   ?    8/7/21 V/Q Scan  ?   ?   ?      Abd/Liver US ?   ?   ?      Misc:  ?   ?   ?    3/25/21 cMRI  ?   ?   ?         ?   ?   ?        V/Q Impression:  Bilateral segmental and subsegmental defects, with new defect of the  central posterior left lung compared to 7/13/2021, likely acute on  chronic pulmonary emboli. Anemic blood pool. Multiple nonenlarged  mesenteric and retroperitoneal lymph nodes.    Echo Impression:  Global right ventricular function is normal. The right ventricle is normal  size.  No significant valvular abnormalities.  The estimated PA systolic pressure is 20 mmHg.  This study was compared with the study from 05/02/2021 . The PA pressure has  declined.

## 2021-09-20 NOTE — LETTER
9/20/2021         RE: Jennifer Cervantes  [address]  [facility] [address]        Dear Colleague,    Thank you for referring your patient, Jennifer Cervantes, to the [facility]. Please see a copy of my visit note below.           Sickle Cell Outpatient Follow Up Visit    Jennifer is a 21 year old who is being evaluated via a billable video visit.      How would you like to obtain your AVS? MyChart  If the video visit is dropped, the invitation should be resent by: Text to cell phone: [phone]   Will anyone else be joining your video visit? No    Video-Visit Details    Video Start Time: 12:02 pm    Type of service:  Video Visit    Video End Time:12:33 pm  Duration: 31 minutes    Originating Location (pt. Location): Home    Distant Location (provider location):  [facility]   Platform used for Video Visit: Tomorrowish      Date of encounter: Sep 20, 2021    Jennifer Cervantes is a 21 year old female who is being seen virtually for regular care and ED follow ups related to SCD      Interval History: Since our last visit, Jennifer has gone through spells of being able to stay out of the ED but still has gone pretty frequently. We discussed whether she was bothered by going to the ED or not. She said in the past she was not, because she felt like she had nursing support in the ED, but her understanding is that one of her supportive nurses, Dejuan, has perhaps left and the other one (Kilo, I believe) has been quite busy in the last few times she has gone. With those two nurses unavailable, she has been more anxious about the care she is receiving, though she did not say that there were overt problems like in the recent past. She did endorse that the infusions help for a short time and take the edge off the pain, but she does admit there is some chronic pain involved.     She has been doing well with the dabigatran and aspirin. She does need refills on most meds. She said she  has gotten back on the every other week Desferal but recently got a letter from her [employer] (?) insurance saying she is now needing to pay that out of pocket, which wasn't necessarily the case from the beginning and despite it being indicated for this clinical situation. She is still taking her deferasirox daily. She is interested in doing some opioid switching and considering MS Contin rather than Oxycontin to see if it would help more    No fevers or cough. No signs of infection. We did spend some time talking about her goals in life outside of sickle cell disease, as reported below, consistent with our new effort to focus on success outside of the medical care spectrum to see if it can affect her health positively as well. She is eager to trial this strategy.       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 She also has significant anxiety and depression with a strong anxiety about transferring to the adult clinic. She usually has pain crises secondary to the anxiety.       ---------------------------------------  Jennifer's Goals (discussed 09/19/2021 )    1-3 month goal:  Move into her own living space    6 month goal:  Enroll in college classes    12 month goal:  TBD    Disease-specific goal(s):  Reduce ED trips and utilizing success in other non-medical endeavors to help with pain management  ---------------------------------------    --------------------------------  Plan last reviewed with patient: 9/20/2021    Patient background: 20yo F, enjoys movies and kids though there are times where she does not really want to talk to people. Does not have a lot of social  support at home.     Sickle Cell Disease History  Primary Hematologist: Perla  PCP: Raul  Genotype: SS  Acute Pain Crisis Treatment: (avoiding IV opioids for now, which she has agreed to)    ER   o Oxycodone 15-20 mg x1  o Toradol 15 mg IV x1   o Maintenance IV fluids with LR  o Other: Zofran 8 mg IV PRN nausea    Inpatient:  o Home oxycodone/Oxycontin regimen, though home oxycodone dosing could be increased to 20 mg to start  o PCA plan:   - None for now  o Other Medications: Zofran  o She has had success with ketamine starting at 4mg/h and advancing only to 6mg/h, as 8mg/h made her feel quite poorly.  o ASA/Dabigatran   o Supportive Care: Docusate, Senna  Chronic Pain Medications:    Home regimen Oxycontin 10 mg q12h, oxycodone 15 mg p.o. q.4-6 hours p.r.n. breakthrough pain.  She also continues on Voltaren gel, and Zoloft among other medications.    -Also benefits from mental health visits, acupuncture  Baseline Hemoglobin: 7 g/dl without chronic transfusions  Hydroxyurea use: Yes  H/O blood transfusions: Yes, several (iron overload) Most recent 7/13/2021    H/O Transfusion Reactions: no    Antibodies:none  H/O Acute Chest Syndrome: Yes    Last episode: 10/2019     ICU/intubation: unsure  H/O Stroke: Yes (managed with chronic transfusions in the past, stopped late Spring 2020)  H/O VTE: Yes (2/2021)  H/O Cholecystectomy or Splenectomy: no  H/O Asthma, Pulm HTN, AVN, Leg Ulcers, Nephropathy, Retinopathy, etc: Iron overload, asthma, chronic lung disease, physical limitations from early stroke     -------------------------------------------    Sickle Cell Disease Comprehensive Checklist    Bone Health/Avascular Necrosis Screening/Symptoms (each visit): no new concerns today    Leg Ulcer evaluation (every visit): none    Hypertension (every visit): borderline hypertensive recently but improved later in evening and previous day on most of ED visits    Last pulmonary evaluation (asthma, AMAN, pulm HTN): within  last year( due again)    Stroke/silent cerebral infarct Hx (Y/N): Yes TIA ~2014, first event ~age 1 with full stroke and R sided weakness    Last PCP Visit: 8/2020    Vaccines:  o PCV13: 5/13/19  o Pneumovax (PPSV23): 3/04, 10/09, 7/12/19 (next due 7/2024)  o Menactra: 4/2010, 9/2015 (MCV done 8/16)  o Influenza: got 20-21 vaccine per self report    Audiology (chelation): done 6/2020, normal (due again)    Past Medical History:  Past Medical History:   Diagnosis Date     Anxiety      Bleeding disorder (H)      Blood clotting disorder (H)      Cerebral infarction (H) 2015     Cognitive developmental delay     low IQ. 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 (H)     right hand contractures     Iron overload due to repeated red blood cell transfusions      Migraines      Multiple subsegmental pulmonary emboli without acute cor pulmonale (H) 02/01/2021     Oppositional defiant behavior      Superficial venous thrombosis of arm, right 03/25/2021     Uncomplicated asthma        Past Surgical History:  Past Surgical History:   Procedure Laterality Date     AS INSERT TUNNELED CV 2 CATH W/O PORT/PUMP       C BREAST REDUCTION (INCLUDES LIPO) TIER 3 Bilateral 04/23/2019     CHOLECYSTECTOMY       INSERT PORT VASCULAR ACCESS Left 4/21/2021    Procedure: INSERTION, VASCULAR ACCESS PORT (NOT SURE ON SIDE UNTIL REMOVAL);  Surgeon: Rajan More MD;  Location: [facility] OR     IR CHEST PORT PLACEMENT > 5 YRS OF AGE  4/21/2021     IR CVC NON TUNNEL LINE REMOVAL  5/6/2021     IR CVC NON TUNNEL PLACEMENT  04/07/2020     IR CVC NON TUNNEL PLACEMENT  4/30/2021     IR PORT REMOVAL LEFT  4/21/2021     REMOVE PORT VASCULAR ACCESS Left 4/21/2021    Procedure: REMOVAL, VASCULAR ACCESS PORT LEFT;  Surgeon: Rajan More MD;  Location: [facility] OR     REPAIR TENDON ELBOW Right 10/02/2019    Procedure: Right Elbow Flexor  "Lengthening, Flexor Pronator Slide Of Wrist and Finger, Thumb Adductor Lengthening;  Surgeon: Anai Franco MD;  Location: [facility] OR     TONSILLECTOMY Bilateral 10/02/2019    Procedure: Bilateral Tonsillectomy;  Surgeon: Farhana Guy MD;  Location: [facility] OR     Social History:  Social History     Socioeconomic History     Marital status: Single     Spouse name: Not on file     Number of children: Not on file     Years of education: Not on file     Highest education level: Not on file   Occupational History     Not on file   Tobacco Use     Smoking status: Never Smoker     Smokeless tobacco: Never Used   Substance and Sexual Activity     Alcohol use: Not Currently     Alcohol/week: 0.0 standard drinks     Drug use: Never     Sexual activity: Not Currently     Partners: Male     Birth control/protection: Other   Other Topics Concern     Parent/sibling w/ CABG, MI or angioplasty before 65F 55M? Not Asked   Social History Narrative    Lives with mom and extended family but \"toxic environment\" per her report. She would like to move out but cannot financially do so. She has minimal support at home despite her significant SCD comorbidities and cognitive delay from stroke.     Social Determinants of Health     Financial Resource Strain:      Difficulty of Paying Living Expenses:    Food Insecurity:      Worried About Running Out of Food in the Last Year:      Ran Out of Food in the Last Year:    Transportation Needs:      Lack of Transportation (Medical):      Lack of Transportation (Non-Medical):    Physical Activity:      Days of Exercise per Week:      Minutes of Exercise per Session:    Stress:      Feeling of Stress :    Social Connections:      Frequency of Communication with Friends and Family:      Frequency of Social Gatherings with Friends and Family:      Attends Confucianism Services:      Active Member of Clubs or Organizations:      Attends Club or Organization Meetings:      Marital Status:  "   Intimate Partner Violence: Not At Risk     Fear of Current or Ex-Partner: No     Emotionally Abused: No     Physically Abused: No     Sexually Abused: No       Medications:  Current Outpatient Medications   Medication     acetaminophen (TYLENOL) 325 MG tablet     albuterol (PROAIR HFA/PROVENTIL HFA/VENTOLIN HFA) 108 (90 Base) MCG/ACT inhaler     albuterol (PROVENTIL) (2.5 MG/3ML) 0.083% neb solution     ARIPiprazole (ABILIFY) 2 MG tablet     aspirin (ASA) 81 MG chewable tablet     budesonide-formoterol (SYMBICORT) 160-4.5 MCG/ACT Inhaler     dabigatran ANTICOAGULANT (PRADAXA) 150 MG capsule     diclofenac (VOLTAREN) 1 % topical gel     diphenhydrAMINE (BENADRYL) 25 MG capsule     DULoxetine (CYMBALTA) 30 MG capsule     EPINEPHrine (ANY BX GENERIC EQUIV) 0.3 MG/0.3ML injection 2-pack     Hydroxyurea 1000 MG TABS     hydrOXYzine (ATARAX) 25 MG tablet     JADENU 360 MG tablet     lidocaine-prilocaine (EMLA) 2.5-2.5 % external cream     medroxyPROGESTERone (DEPO-PROVERA) 150 MG/ML IM injection     naloxone (NARCAN) 4 MG/0.1ML nasal spray     omeprazole (PRILOSEC) 20 MG DR capsule     ondansetron (ZOFRAN) 8 MG tablet     oxyCODONE (OXYCONTIN) 10 MG 12 hr tablet     oxyCODONE IR (ROXICODONE) 15 MG tablet     Current Facility-Administered Medications   Medication     medroxyPROGESTERone (DEPO-PROVERA) syringe 150 mg         Physical Exam:   There were no vitals taken for this visit.     GEN: young  female, calm and collected today, in a good mood and quite engaged  HEENT: slight icterus, MMM  RESP: clear to auscultation  SKIN: no bruising noted  NEURO: alert and oriented, right wrist contracture stable. Gait antalgic but stable      Labs:   Results for HIMANSHU AL (MRN [id number]) as of 9/21/2021 10:36   Ref. Range 9/20/2021 03:01   Sodium Latest Ref Range: 133 - 144 mmol/L 140   Potassium Latest Ref Range: 3.4 - 5.3 mmol/L 3.6   Chloride Latest Ref Range: 94 - 109 mmol/L 113 (H)   Carbon Dioxide  Latest Ref Range: 20 - 32 mmol/L 21   Urea Nitrogen Latest Ref Range: 7 - 30 mg/dL 11   Creatinine Latest Ref Range: 0.52 - 1.04 mg/dL 0.76   GFR Estimate Latest Ref Range: >60 mL/min/1.73m2 >90   Calcium Latest Ref Range: 8.5 - 10.1 mg/dL 8.5   Anion Gap Latest Ref Range: 3 - 14 mmol/L 6   HCG Qualitative Serum Latest Ref Range: Negative  Negative   Glucose Latest Ref Range: 70 - 99 mg/dL 97   WBC Latest Ref Range: 4.0 - 11.0 10e3/uL 13.4 (H)   Hemoglobin Latest Ref Range: 11.7 - 15.7 g/dL 7.4 (L)   Hematocrit Latest Ref Range: 35.0 - 47.0 % 20.6 (L)   Platelet Count Latest Ref Range: 150 - 450 10e3/uL 261   RBC Count Latest Ref Range: 3.80 - 5.20 10e6/uL 2.19 (L)   MCV Latest Ref Range: 78 - 100 fL 94   MCH Latest Ref Range: 26.5 - 33.0 pg 33.8 (H)   MCHC Latest Ref Range: 31.5 - 36.5 g/dL 35.9   RDW Latest Ref Range: 10.0 - 15.0 % 23.9 (H)   % Neutrophils Latest Units: % 59   % Lymphocytes Latest Units: % 24   % Monocytes Latest Units: % 4   % Eosinophils Latest Units: % 9   Absolute Basophils Latest Ref Range: 0.0 - 0.2 10e3/uL 0.5 (H)   % Basophils Latest Units: % 4   NRBC/W Latest Ref Range: <=0 % 24 (H)   Absolute Neutrophil Latest Ref Range: 1.6 - 8.3 10e3/uL 7.9   Absolute Lymphocytes Latest Ref Range: 0.8 - 5.3 10e3/uL 3.2   Absolute Monocytes Latest Ref Range: 0.0 - 1.3 10e3/uL 0.5   Absolute Eosinophils Latest Ref Range: 0.0 - 0.7 10e3/uL 1.2 (H)   Absolute NRBCs Latest Ref Range: <=0.0 10e3/uL 3.2 (H)   RBC Morphology Unknown Confirmed RBC Indices   Platelet Morphology Latest Ref Range: Automated Count Confirmed. Platelet morphology is normal.  Automated Count Confirmed. Platelet morphology is normal.   Polychromasia Latest Ref Range: None Seen  Moderate (A)   Sickle Cells Latest Ref Range: None Seen  Marked (A)   Target Cells Latest Ref Range: None Seen  Moderate (A)   Elliptocytes Latest Ref Range: None Seen  Moderate (A)   % Retic Latest Ref Range: 0.5 - 2.0 % 25.1 (H)   Absolute Retic Latest Ref  Range: 0.025 - 0.095 10e6/uL 0.551 (H)     Imaging:   Ferriscan 8/18/21  History of sickle cell disease with chronic transfusions and extreme iron overload, currently on medication. Hemachromatosis due to  repeated. Blood cell transfusions.     COMPARISON: 3/25/2021     TECHNIQUE:     Sequential non-contrast spin-echo MRI imaging obtained of the liver with TR 2500 msec and progressively longer Miley up to 18 msec.     FINDINGS:     Average liver iron concentration is 32.5 mg/g dry tissue (normal = 0.17 - 1.8) previously 43  Average liver iron concentration is 581 mmol/kg dry tissue (normal = 3 - 33)     Other findings: Markedly increased iron deposition in the spleen on subjective evaluation.                                                                      IMPRESSION: Increased iron concentration in the liver and spleen.     I have personally reviewed the examination and initial interpretation and I agree with the findings.     DAYNA PAUL MD   ----------    Assessment:  Jennifer Cervantes is a 21 year old female with HbSS. Her disease has been complicated by stroke leading to some slight cognitive delay and right sided hemiparesis, high anxiety leading to frequent pain crises on top of chronic pain syndrome, poor social structure at home with no real support, and iron overload secondary to repeated transfusions.    She is doing much better today. We spoke a good bit about her current approach to pain medications and some trial options for alternative methods. We agreed that she is fine going to the ED but we will forego IV infusions for now (it is okay to get them if coming to infusion). She is concerned about a lack of familiar friendly faces in the ED. We will work to manage her at home and I plan to call at least once a week to see how things are going. She is due for a cardiology appointment tomorrow. We also need to square away her Desferal, which is indicated and thankfully is showing excellent response,  to ensure it is covered by insurance.    Crizanlizumab is due Wed    Major Topics of the Visit:  1. Pain Management: Updated pain plan. Only PO opioids in ED    -Crizanlizumab monthly (started June 2021)   2. Iron overload/Pharmacy Issues: She had Jadenu ordered a few months ago but there have been difficulties getting it at refill time. She is now on Desferal as well. LIC was 43 (3/2021) so we need to act urgently. Desferal HD over 48 hours is going well. Synthetic function for the liver seems to be intact  Brenda in December Needs audiology follow up as well, last done June 2020.  3. High RBC turnover: Jennifer really has a high degree of RBC turnover, more than most patients I have seen. Oxybryta led to more frequent pain episodes (chest pain, which was new) and did not change the RBC turnover so it was stopped in December. No change currently  4. Pulmonary Emboli + new RUE DVT (innominate vein) of unclear chronicity + new symptomatic R cephalic SVT this week: Now on dabigatran after having DVT/PE while on apixaban and rivaroxaban. Her levels have been therapeutic so it is odd that her metabolism of drugs is less predictable. We did start her back on ASA. She may have some pulmonary HTN with her recurrent chest discomfort and known PEs. Back on ASA  5. Stroke sequelae on right side: Follow up with  PM&R as needed  Follow up: Virtual with Andrei Machado in 2 weeks    Review of inpatient care as documented above   Review of the result(s) of each unique test - labs as above  Diagnosis or treatment significantly limited by social determinants of health - sickle cell disease, racial inequity, difficult social situation at home  Ordering of specific tests      Eric Duncan MD  Hematologist  Division of Hematology, Oncology, and Transplantation  [facility]  [facility]  Pager: [phone]              Again, thank you for allowing me to participate in the care of your  patient.        Sincerely,        Eric Duncan MD

## 2021-09-21 ENCOUNTER — PATIENT OUTREACH (OUTPATIENT)
Dept: ONCOLOGY | Facility: CLINIC | Age: 21
End: 2021-09-21

## 2021-09-21 ENCOUNTER — HOME INFUSION (PRE-WILLOW HOME INFUSION) (OUTPATIENT)
Dept: PHARMACY | Facility: CLINIC | Age: 21
End: 2021-09-21

## 2021-09-21 DIAGNOSIS — D57.00 SICKLE CELL PAIN CRISIS (H): ICD-10-CM

## 2021-09-21 DIAGNOSIS — D57.00 SICKLE CELL CRISIS (H): Primary | ICD-10-CM

## 2021-09-21 RX ORDER — METHYLPREDNISOLONE SODIUM SUCCINATE 125 MG/2ML
125 INJECTION, POWDER, LYOPHILIZED, FOR SOLUTION INTRAMUSCULAR; INTRAVENOUS
Status: CANCELLED
Start: 2021-10-19

## 2021-09-21 RX ORDER — HEPARIN SODIUM,PORCINE 10 UNIT/ML
5 VIAL (ML) INTRAVENOUS
Status: CANCELLED | OUTPATIENT
Start: 2021-10-19

## 2021-09-21 RX ORDER — MEPERIDINE HYDROCHLORIDE 25 MG/ML
25 INJECTION INTRAMUSCULAR; INTRAVENOUS; SUBCUTANEOUS EVERY 30 MIN PRN
Status: CANCELLED | OUTPATIENT
Start: 2021-10-19

## 2021-09-21 RX ORDER — HEPARIN SODIUM,PORCINE 10 UNIT/ML
5 VIAL (ML) INTRAVENOUS
Status: CANCELLED | OUTPATIENT
Start: 2021-09-22

## 2021-09-21 RX ORDER — DIPHENHYDRAMINE HYDROCHLORIDE 50 MG/ML
50 INJECTION INTRAMUSCULAR; INTRAVENOUS
Status: CANCELLED
Start: 2021-09-22

## 2021-09-21 RX ORDER — DIPHENHYDRAMINE HYDROCHLORIDE 50 MG/ML
50 INJECTION INTRAMUSCULAR; INTRAVENOUS
Status: CANCELLED
Start: 2021-10-19

## 2021-09-21 RX ORDER — MEPERIDINE HYDROCHLORIDE 25 MG/ML
25 INJECTION INTRAMUSCULAR; INTRAVENOUS; SUBCUTANEOUS EVERY 30 MIN PRN
Status: CANCELLED | OUTPATIENT
Start: 2021-09-22

## 2021-09-21 RX ORDER — METHYLPREDNISOLONE SODIUM SUCCINATE 125 MG/2ML
125 INJECTION, POWDER, LYOPHILIZED, FOR SOLUTION INTRAMUSCULAR; INTRAVENOUS
Status: CANCELLED
Start: 2021-09-22

## 2021-09-21 RX ORDER — ALBUTEROL SULFATE 90 UG/1
1-2 AEROSOL, METERED RESPIRATORY (INHALATION)
Status: CANCELLED
Start: 2021-10-19

## 2021-09-21 RX ORDER — ALBUTEROL SULFATE 0.83 MG/ML
2.5 SOLUTION RESPIRATORY (INHALATION)
Status: CANCELLED | OUTPATIENT
Start: 2021-09-22

## 2021-09-21 RX ORDER — ALBUTEROL SULFATE 0.83 MG/ML
2.5 SOLUTION RESPIRATORY (INHALATION)
Status: CANCELLED | OUTPATIENT
Start: 2021-10-19

## 2021-09-21 RX ORDER — EPINEPHRINE 1 MG/ML
0.3 INJECTION, SOLUTION INTRAMUSCULAR; SUBCUTANEOUS EVERY 5 MIN PRN
Status: CANCELLED | OUTPATIENT
Start: 2021-09-22

## 2021-09-21 RX ORDER — ALBUTEROL SULFATE 90 UG/1
1-2 AEROSOL, METERED RESPIRATORY (INHALATION)
Status: CANCELLED
Start: 2021-09-22

## 2021-09-21 RX ORDER — EPINEPHRINE 1 MG/ML
0.3 INJECTION, SOLUTION INTRAMUSCULAR; SUBCUTANEOUS EVERY 5 MIN PRN
Status: CANCELLED | OUTPATIENT
Start: 2021-10-19

## 2021-09-21 RX ORDER — HEPARIN SODIUM (PORCINE) LOCK FLUSH IV SOLN 100 UNIT/ML 100 UNIT/ML
5 SOLUTION INTRAVENOUS
Status: CANCELLED | OUTPATIENT
Start: 2021-10-19

## 2021-09-21 RX ORDER — NALOXONE HYDROCHLORIDE 0.4 MG/ML
0.2 INJECTION, SOLUTION INTRAMUSCULAR; INTRAVENOUS; SUBCUTANEOUS
Status: CANCELLED | OUTPATIENT
Start: 2021-10-19

## 2021-09-21 RX ORDER — HEPARIN SODIUM (PORCINE) LOCK FLUSH IV SOLN 100 UNIT/ML 100 UNIT/ML
5 SOLUTION INTRAVENOUS
Status: CANCELLED | OUTPATIENT
Start: 2021-09-22

## 2021-09-21 RX ORDER — NALOXONE HYDROCHLORIDE 0.4 MG/ML
0.2 INJECTION, SOLUTION INTRAMUSCULAR; INTRAVENOUS; SUBCUTANEOUS
Status: CANCELLED | OUTPATIENT
Start: 2021-09-22

## 2021-09-21 NOTE — PROGRESS NOTES
Called [facility] to inquire about Desferal home infusion denial by insurance, clinic had not received any faxes, notices etc, reached vm and lmcb.

## 2021-09-21 NOTE — PROGRESS NOTES
"Pt informed Dr. Duncan at 9/20 visit that she received a letter from [employer] denying home IV chelation therapy which was ordered for every 2 weeks. Her last infusion was \"2-3 months ago\" per pt. Asked pt to bring in letter when she comes for her Jr infusion tomorrow 9/22 and writer will follow-up with infusion specialist about denial, pt stated she would.    Noticed pt did not make it to her Pulmonary in person clinic visit this morning at 8 am, she stated her ride did not show up and she had not called to reschedule, transferred pt directly to clinic to reschedule [phone].  "

## 2021-09-21 NOTE — PROGRESS NOTES
Received call back from [facility] pharmacist Bianca that they have not been able to get ahold of pt to transfer her to billing, they have not received any notices about Desferal being denied by insurance and test claims goes through as approved. Would also like to see letter that pt claimed she received from insurance. Writer assured her will forward copy of letter once pt brings it in tomorrow.

## 2021-09-22 ENCOUNTER — INFUSION THERAPY VISIT (OUTPATIENT)
Dept: ONCOLOGY | Facility: CLINIC | Age: 21
End: 2021-09-22
Attending: PEDIATRICS
Payer: COMMERCIAL

## 2021-09-22 ENCOUNTER — TELEPHONE (OUTPATIENT)
Dept: ONCOLOGY | Facility: CLINIC | Age: 21
End: 2021-09-22

## 2021-09-22 VITALS
HEART RATE: 94 BPM | OXYGEN SATURATION: 98 % | DIASTOLIC BLOOD PRESSURE: 80 MMHG | WEIGHT: 171.2 LBS | SYSTOLIC BLOOD PRESSURE: 129 MMHG | RESPIRATION RATE: 16 BRPM | BODY MASS INDEX: 29.39 KG/M2 | TEMPERATURE: 97.8 F

## 2021-09-22 DIAGNOSIS — D57.00 SICKLE CELL CRISIS (H): Primary | ICD-10-CM

## 2021-09-22 DIAGNOSIS — D57.00 SICKLE CELL PAIN CRISIS (H): ICD-10-CM

## 2021-09-22 PROBLEM — G81.10 SPASTIC HEMIPLEGIA (H): Status: ACTIVE | Noted: 2021-09-22

## 2021-09-22 PROCEDURE — 96413 CHEMO IV INFUSION 1 HR: CPT

## 2021-09-22 PROCEDURE — 96375 TX/PRO/DX INJ NEW DRUG ADDON: CPT

## 2021-09-22 PROCEDURE — 96365 THER/PROPH/DIAG IV INF INIT: CPT

## 2021-09-22 PROCEDURE — 258N000003 HC RX IP 258 OP 636: Performed by: PEDIATRICS

## 2021-09-22 PROCEDURE — 250N000011 HC RX IP 250 OP 636: Performed by: PEDIATRICS

## 2021-09-22 PROCEDURE — 96376 TX/PRO/DX INJ SAME DRUG ADON: CPT

## 2021-09-22 PROCEDURE — 96361 HYDRATE IV INFUSION ADD-ON: CPT

## 2021-09-22 RX ORDER — HEPARIN SODIUM (PORCINE) LOCK FLUSH IV SOLN 100 UNIT/ML 100 UNIT/ML
5 SOLUTION INTRAVENOUS
Status: DISCONTINUED | OUTPATIENT
Start: 2021-09-22 | End: 2021-09-22 | Stop reason: HOSPADM

## 2021-09-22 RX ORDER — HEPARIN SODIUM,PORCINE 10 UNIT/ML
5 VIAL (ML) INTRAVENOUS
Status: CANCELLED | OUTPATIENT
Start: 2021-10-01

## 2021-09-22 RX ORDER — ONDANSETRON 8 MG/1
8 TABLET, FILM COATED ORAL
Status: CANCELLED
Start: 2021-10-01

## 2021-09-22 RX ORDER — MORPHINE SULFATE 2 MG/ML
2 INJECTION, SOLUTION INTRAMUSCULAR; INTRAVENOUS
Status: CANCELLED
Start: 2021-10-01

## 2021-09-22 RX ORDER — MORPHINE SULFATE 2 MG/ML
2 INJECTION, SOLUTION INTRAMUSCULAR; INTRAVENOUS
Status: COMPLETED | OUTPATIENT
Start: 2021-09-22 | End: 2021-09-22

## 2021-09-22 RX ORDER — DIPHENHYDRAMINE HCL 25 MG
25 CAPSULE ORAL
Status: CANCELLED
Start: 2021-10-01

## 2021-09-22 RX ORDER — OXYCODONE HCL 10 MG/1
10 TABLET, FILM COATED, EXTENDED RELEASE ORAL EVERY 12 HOURS
Qty: 60 TABLET | Refills: 0 | Status: SHIPPED | OUTPATIENT
Start: 2021-09-22 | End: 2021-10-18 | Stop reason: ALTCHOICE

## 2021-09-22 RX ORDER — HEPARIN SODIUM (PORCINE) LOCK FLUSH IV SOLN 100 UNIT/ML 100 UNIT/ML
5 SOLUTION INTRAVENOUS
Status: CANCELLED | OUTPATIENT
Start: 2021-10-01

## 2021-09-22 RX ADMIN — DEXTROSE AND SODIUM CHLORIDE 500 ML: 5; 450 INJECTION, SOLUTION INTRAVENOUS at 11:19

## 2021-09-22 RX ADMIN — SODIUM CHLORIDE 250 ML: 9 INJECTION, SOLUTION INTRAVENOUS at 09:57

## 2021-09-22 RX ADMIN — MORPHINE SULFATE 2 MG: 2 INJECTION, SOLUTION INTRAMUSCULAR; INTRAVENOUS at 12:28

## 2021-09-22 RX ADMIN — SODIUM CHLORIDE 400 MG: 9 INJECTION, SOLUTION INTRAVENOUS at 09:57

## 2021-09-22 RX ADMIN — Medication 5 ML: at 13:48

## 2021-09-22 RX ADMIN — MORPHINE SULFATE 2 MG: 2 INJECTION, SOLUTION INTRAMUSCULAR; INTRAVENOUS at 11:20

## 2021-09-22 RX ADMIN — MORPHINE SULFATE 2 MG: 2 INJECTION, SOLUTION INTRAMUSCULAR; INTRAVENOUS at 13:29

## 2021-09-22 NOTE — PATIENT INSTRUCTIONS
Ange Triage and after hours / weekends / holidays:  [phone]    Please call the triage or after hours line if you experience a temperature greater than or equal to 100.5, shaking chills, have uncontrolled nausea, vomiting and/or diarrhea, dizziness, shortness of breath, chest pain, bleeding, unexplained bruising, or if you have any other new/concerning symptoms, questions or concerns.      If you are having any concerning symptoms or wish to speak to a provider before your next infusion visit, please call your care coordinator or triage to notify them so we can adequately serve you.     If you need a refill on a narcotic prescription or other medication, please call before your infusion appointment.                 September 2021 Sunday Monday Tuesday Wednesday Thursday Friday Saturday                  1     2    Admission  12:49 AM   Jamee Martin MD   [facility]   (Discharge: 9/2/2021)    XR CHEST 2 VIEWS   1:50 AM   (20 min.)   [id number]   [facility] 3     4    Admission  12:35 AM   Jamee Martin MD   [facility]   (Discharge: 9/4/2021)    US LWR EXT VENOUS DUPLEX BILAT   2:15 AM   (40 min.)   [email or web address]   [facility]    XR CHEST PORT 1 VIEW   4:10 AM   (20 min.)   [id number]   [facility]   5     6     7    Admission   1:36 AM   Jamee Martin MD   [facility]   (Discharge: 9/7/2021) 8     9    Admission   1:32 AM   Raul Diaz DO   [facility]   (Discharge: 9/9/2021) 10     11    Admission   9:35 AM   [facility]   (Discharge: 9/11/2021)   12    Admission  12:31 AM   Andrew Chou MD   [facility]   (Discharge: 9/12/2021)    XR CHEST 2 VIEWS   2:45 AM   (20 min.)   [email or web address]   [facility] 13      14    Admission   1:11 AM   [facility]   (Discharge: 9/14/2021)    XR CHEST 2 VIEWS   2:20 AM   (20 min.)   [id number]   [facility] 15    XR PELVIS & HIP BILATERAL 2 VW  12:15 PM   (20 min.)   [id number]   [facility] [place]    LAB CENTRAL   1:00 PM   (15 min.)    [facility] DRAW   [facility]    ONC INFUSION 2 HR (120 MIN)   1:30 PM   (120 min.)   [facility] ONC INFUSION NURSE   [facility] 16     17    Admission   4:23 AM   [facility]   (Discharge: 9/17/2021)    XR CHEST 2 VIEWS   6:10 AM   (20 min.)   [email or web address]   [facility] 18       19     20    Admission   2:55 AM   [facility]   (Discharge: 9/20/2021)    XR CHEST 1 VIEW   3:25 AM   (20 min.)   [email or web address]   [facility]    VIDEO VISIT RETURN  11:45 AM   (30 min.)   Eric Duncan MD   [facility] 21    [facility]IMARY PULMON[facility]   7:45 AM   (60 min.)   Chase Ferrer MD   [facility] 22    ONC INFUSION 3 HR (180 MIN)   9:00 AM   (180 min.)   [facility] ONC INFUSION NURSE   [facility] 23     24    RETURN   7:45 AM   (60 min.)   Katherine Pierce MD   [facility] 25       26     27     28     29 30 October 2021 Sunday Monday Tuesday Wednesday Thursday Friday Saturday                            1     2       3     4     5     6    [facility] RETURN   1:20 PM   (40 min.)   Eric Alvarado MD   [facility] 7     8     9       10     11     12     13     14     15     16       17     18    LAB CENTRAL  11:15 AM   (15 min.)   [facility]   [facility]    RETURN  11:45 AM   (30 min.)   Eric Duncan  MD JAS   [facility] 19    VIDEO VISIT NEW   8:45 AM   (60 min.)   Jonas Cervantes MD   [facility] [place] 20    ONC INFUSION 3 HR (180 MIN)   9:00 AM   (180 min.)    ONC INFUSION NURSE   [facility] 21     22     23       24     25     26     27     28     29     30       31                                                   Lab Results:  No results found for this or any previous visit (from the past 12 hour(s)).

## 2021-09-22 NOTE — PROGRESS NOTES
"Pt did bring in EOB from [employer] with service dates 4/5/2021-4/7/2021 \"Total Amount You Owe: $591.00. Confirmed with pt that she had not received any bills from [employer], just these EOB from [employer], she stated she has many of these at home and can bring copies if needed.    Informed her will follow-up with billing and/or [facility] and let her know what else they would need, she verbalized understanding.    Called Bianca at [facility] who had reached out to [facility] and was told pt's back claims were being re-processed it was her understanding that pt would not be billed or held responsible for these once finalized. Requested copy of above EOB which was sent by securely using clinic email protocol.  "

## 2021-09-22 NOTE — PROGRESS NOTES
IB inquiring about whether patient can receive IVF/pain meds with IV Jr sent to Dr. Duncan yesterday to prepare for infusion today. Per IB from Dr. Duncan on 9/21/2021 at 9pm: ok to give IVF/pain meds AFTER IV Jr on 9/22/2021.    Infusion Nursing Note:  Jennifer Cervantes presents today for Crizanlizumab-tmca (last dose 8/24), IVF, pain meds.    Patient seen by provider today: No   present during visit today: Not Applicable.    Note: Patient presents to infusion feeling ok. Patient presents to infusion with 9/10 low back and bilateral rib pain consistent of pain crisis. Patient given heat packs as well to help with discomfort. Specifically, patient denies s/s of infection such as fever, sore throat, cough, chest pain, shortness of breath, or changes in taste/smell.    ~~~ NOTE: If the patient answers yes to any of the questions below, hold the infusion and contact ordering provider or on-call provider.    1. Have you recently had an elevated temperature, fever, chills, productive cough, coughing for 3 weeks or longer or hemoptysis,  abnormal vital signs, night sweats,  chest pain or have you noticed a decrease in your appetite, unexplained weight loss or fatigue? No  2. Do you have any open wounds or new incisions? No  3. Do you have any recent or upcoming hospitalizations, surgeries or dental procedures? No  4. Do you currently have or recently have had any signs of illness or infection or are you on any antibiotics? No  5. Have you had any new, sudden or worsening abdominal pain? No  6. Have you or anyone in your household received a live vaccination in the past 4 weeks? Please note:  No live vaccines while on biologic/chemotherapy until 6 months after the last treatment.  Patient can receive the flu vaccine (shot only) and the pneumovax.  It is optimal for the patient to get these vaccines mid cycle, but they can be given at any time as long as it is not on the day of the infusion. No  7. Have you  recently been diagnosed with any new nervous system diseases (ie. Multiple sclerosis, Guillain Hailey, seizures, neurological changes) or cancer diagnosis? Are you on any form of radiation or chemotherapy? No  8. Are you pregnant or breast feeding or do you have plans of pregnancy in the future? No  9. Have you been having any signs of worsening depression or suicidal ideations?  (benlysta only) No  10. Have there been any other new onset medical symptoms? No    Intravenous Access:  Implanted Port.    Treatment Conditions:  Lab Results   Component Value Date    HGB 7.4 (L) 09/20/2021    WBC 13.4 (H) 09/20/2021    ANEU 7.9 09/20/2021    ANEUTAUTO 8.0 09/12/2021     09/20/2021      Lab Results   Component Value Date     09/20/2021    POTASSIUM 3.6 09/20/2021    MAG 1.7 02/21/2021    CR 0.76 09/20/2021    MICAH 8.5 09/20/2021    BILITOTAL 3.5 (H) 09/15/2021    ALBUMIN 4.1 09/15/2021    ALT 64 (H) 09/15/2021    AST 87 (H) 09/15/2021     Results reviewed, labs MET treatment parameters, ok to proceed with treatment.      Post Infusion Assessment:  Patient tolerated infusion without incident.  Post 3 doses of IV pain meds and IVF's, patient rates pain a 6/10 stating she is comfortable going home.  Patient observed for 30 minutes post IV crizanlizumab-tmca per protocol.  Blood return noted pre and post infusion.  Site patent and intact, free from redness, edema or discomfort.  No evidence of extravasations.  Access discontinued per protocol.       Discharge Plan:   Prescription refills given for everything ordered on 9/20 (13 meds total)  Discharge instructions reviewed with: Patient.  Patient and/or family verbalized understanding of discharge instructions and all questions answered.  AVS to patient via [employer].  Patient will return PRN for next appointment.   Patient discharged in stable condition accompanied by: self.  Departure Mode: Ambulatory.  Face to Face time: 0 minutes.      Rik Ayers  RN

## 2021-09-22 NOTE — TELEPHONE ENCOUNTER
Pt called in to triage requesting IVF pain meds for lower back and rib pain rated 9/10 x 2 days. Stated last took prn Oxycodone,oxycontin and tylenol at 5:30 am this morning without relief. Denied any fevers, chills, cough, sob, chest pain or other symptoms. Pt's last infusion was 9/20 was in ED, last clinic visit 9/20/21 with follow-up on 10/18/21 with DR Duncan. Patient states they do have own ride and it will take 15 minutes long to get to cancer clinic.   Has [facility] infusion at 9:00 am. Wondering if pain medication can be added to [facility] infusion.   Pt denied being out of home medications and needing any refills today.    7:29 paged Dr Duncan     7:32 Dr Duncan returned call.   OK to add IVF/Pain to [facility].   Added to teams to alert infusion nurses that this was Ok'ed by DR Duncan.

## 2021-09-22 NOTE — TELEPHONE ENCOUNTER
Narcotic Refill Request    Medication(s) requested:  Oxycontin 10mg.  What pain is the medication treating: Sickle pain  How is the medication being taken?: 1 tablet every 12hours  Does pt have enough for today? Yes.   Is pain being adequately controlled on the current regimen?:Yes  Experiencing any side effects from medication?: Denies    Date of most recent appointment:  9/20/21 with Dr. Duncan  Any No Show Visits:none  Next appointment:   10/18/21 with dr. Duncan  Last fill date and by whom:  8/24/21 by Andrei HIGGINS   Reviewed: Not an agent    Routed Dr. Duncan

## 2021-09-22 NOTE — TELEPHONE ENCOUNTER
Pt called saying running 10-15minutes late.    This writer spoke to Jaida DIAZ in infusion clinic who is aware and verified will add IVF/Pain to the Jr infusion plan for today per Dr. Duncan.     Updated pt on infusion plan for today.

## 2021-09-23 ENCOUNTER — HOME INFUSION (PRE-WILLOW HOME INFUSION) (OUTPATIENT)
Dept: PHARMACY | Facility: CLINIC | Age: 21
End: 2021-09-23

## 2021-09-24 ENCOUNTER — HOME INFUSION (PRE-WILLOW HOME INFUSION) (OUTPATIENT)
Dept: PHARMACY | Facility: CLINIC | Age: 21
End: 2021-09-24

## 2021-09-24 ENCOUNTER — INFUSION THERAPY VISIT (OUTPATIENT)
Dept: INFUSION THERAPY | Facility: CLINIC | Age: 21
End: 2021-09-24
Attending: PEDIATRICS
Payer: COMMERCIAL

## 2021-09-24 ENCOUNTER — TELEPHONE (OUTPATIENT)
Dept: ONCOLOGY | Facility: CLINIC | Age: 21
End: 2021-09-24

## 2021-09-24 VITALS
OXYGEN SATURATION: 91 % | RESPIRATION RATE: 16 BRPM | HEART RATE: 100 BPM | SYSTOLIC BLOOD PRESSURE: 123 MMHG | DIASTOLIC BLOOD PRESSURE: 70 MMHG

## 2021-09-24 DIAGNOSIS — D57.00 SICKLE CELL PAIN CRISIS (H): ICD-10-CM

## 2021-09-24 DIAGNOSIS — G81.10 SPASTIC HEMIPLEGIA, UNSPECIFIED ETIOLOGY, UNSPECIFIED LATERALITY (H): Primary | ICD-10-CM

## 2021-09-24 PROCEDURE — 250N000011 HC RX IP 250 OP 636: Performed by: PEDIATRICS

## 2021-09-24 PROCEDURE — 96361 HYDRATE IV INFUSION ADD-ON: CPT

## 2021-09-24 PROCEDURE — 258N000003 HC RX IP 258 OP 636: Performed by: PEDIATRICS

## 2021-09-24 PROCEDURE — 96376 TX/PRO/DX INJ SAME DRUG ADON: CPT

## 2021-09-24 PROCEDURE — 96374 THER/PROPH/DIAG INJ IV PUSH: CPT

## 2021-09-24 PROCEDURE — 250N000013 HC RX MED GY IP 250 OP 250 PS 637: Performed by: PEDIATRICS

## 2021-09-24 RX ORDER — DIPHENHYDRAMINE HCL 25 MG
25 CAPSULE ORAL
Status: COMPLETED | OUTPATIENT
Start: 2021-09-24 | End: 2021-09-24

## 2021-09-24 RX ORDER — MORPHINE SULFATE 2 MG/ML
2 INJECTION, SOLUTION INTRAMUSCULAR; INTRAVENOUS
Status: DISCONTINUED | OUTPATIENT
Start: 2021-09-24 | End: 2021-09-24 | Stop reason: HOSPADM

## 2021-09-24 RX ORDER — HEPARIN SODIUM (PORCINE) LOCK FLUSH IV SOLN 100 UNIT/ML 100 UNIT/ML
5 SOLUTION INTRAVENOUS
Status: DISCONTINUED | OUTPATIENT
Start: 2021-09-24 | End: 2021-09-24 | Stop reason: HOSPADM

## 2021-09-24 RX ORDER — HEPARIN SODIUM (PORCINE) LOCK FLUSH IV SOLN 100 UNIT/ML 100 UNIT/ML
5 SOLUTION INTRAVENOUS
Status: CANCELLED | OUTPATIENT
Start: 2021-10-01

## 2021-09-24 RX ORDER — MORPHINE SULFATE 2 MG/ML
2 INJECTION, SOLUTION INTRAMUSCULAR; INTRAVENOUS
Status: CANCELLED
Start: 2021-10-01

## 2021-09-24 RX ORDER — HEPARIN SODIUM,PORCINE 10 UNIT/ML
5 VIAL (ML) INTRAVENOUS
Status: CANCELLED | OUTPATIENT
Start: 2021-10-01

## 2021-09-24 RX ORDER — DIPHENHYDRAMINE HCL 25 MG
25 CAPSULE ORAL
Status: CANCELLED
Start: 2021-10-01

## 2021-09-24 RX ORDER — ONDANSETRON 8 MG/1
8 TABLET, FILM COATED ORAL
Status: COMPLETED | OUTPATIENT
Start: 2021-09-24 | End: 2021-09-24

## 2021-09-24 RX ORDER — ONDANSETRON 8 MG/1
8 TABLET, FILM COATED ORAL
Status: CANCELLED
Start: 2021-10-01

## 2021-09-24 RX ORDER — HEPARIN SODIUM,PORCINE 10 UNIT/ML
5 VIAL (ML) INTRAVENOUS
Status: DISCONTINUED | OUTPATIENT
Start: 2021-09-24 | End: 2021-09-24 | Stop reason: HOSPADM

## 2021-09-24 RX ADMIN — MORPHINE SULFATE 2 MG: 2 INJECTION, SOLUTION INTRAMUSCULAR; INTRAVENOUS at 11:31

## 2021-09-24 RX ADMIN — DEXTROSE AND SODIUM CHLORIDE 500 ML: 5; 450 INJECTION, SOLUTION INTRAVENOUS at 11:26

## 2021-09-24 RX ADMIN — HEPARIN 5 ML: 100 SYRINGE at 13:45

## 2021-09-24 RX ADMIN — ONDANSETRON HYDROCHLORIDE 8 MG: 8 TABLET, FILM COATED ORAL at 11:18

## 2021-09-24 RX ADMIN — DIPHENHYDRAMINE HYDROCHLORIDE 25 MG: 25 CAPSULE ORAL at 11:18

## 2021-09-24 RX ADMIN — MORPHINE SULFATE 2 MG: 2 INJECTION, SOLUTION INTRAMUSCULAR; INTRAVENOUS at 12:32

## 2021-09-24 RX ADMIN — MORPHINE SULFATE 2 MG: 2 INJECTION, SOLUTION INTRAMUSCULAR; INTRAVENOUS at 13:30

## 2021-09-24 NOTE — PROGRESS NOTES
Infusion Nursing Note:  Jennifer Cervantes presents today for Fluids, pain meds.    Patient seen by provider today: No   present during visit today: Not Applicable.    Note: Benadryl and Zofran given PO.  Fluids infused over 2 hours.  Morphine IV push.      Intravenous Access:  Implanted Port.    Treatment Conditions:  Not Applicable.      Post Infusion Assessment:  Patient tolerated infusion without incident.  Access discontinued per protocol.       Discharge Plan:   Patient and/or family verbalized understanding of discharge instructions and all questions answered.  Patient discharged in stable condition accompanied by: self.      PRASANNA TRONCOSO RN

## 2021-09-27 ENCOUNTER — INFUSION THERAPY VISIT (OUTPATIENT)
Dept: ONCOLOGY | Facility: CLINIC | Age: 21
End: 2021-09-27
Attending: PEDIATRICS
Payer: COMMERCIAL

## 2021-09-27 VITALS
TEMPERATURE: 97.9 F | DIASTOLIC BLOOD PRESSURE: 82 MMHG | SYSTOLIC BLOOD PRESSURE: 141 MMHG | OXYGEN SATURATION: 92 % | HEART RATE: 108 BPM

## 2021-09-27 DIAGNOSIS — G81.10 SPASTIC HEMIPLEGIA, UNSPECIFIED ETIOLOGY, UNSPECIFIED LATERALITY (H): Primary | ICD-10-CM

## 2021-09-27 DIAGNOSIS — D57.00 SICKLE CELL PAIN CRISIS (H): ICD-10-CM

## 2021-09-27 PROCEDURE — 96361 HYDRATE IV INFUSION ADD-ON: CPT

## 2021-09-27 PROCEDURE — 250N000011 HC RX IP 250 OP 636: Performed by: PEDIATRICS

## 2021-09-27 PROCEDURE — 258N000003 HC RX IP 258 OP 636: Performed by: PEDIATRICS

## 2021-09-27 PROCEDURE — 96376 TX/PRO/DX INJ SAME DRUG ADON: CPT

## 2021-09-27 PROCEDURE — 96374 THER/PROPH/DIAG INJ IV PUSH: CPT

## 2021-09-27 RX ORDER — MORPHINE SULFATE 2 MG/ML
2 INJECTION, SOLUTION INTRAMUSCULAR; INTRAVENOUS
Status: DISCONTINUED | OUTPATIENT
Start: 2021-09-27 | End: 2021-09-27 | Stop reason: HOSPADM

## 2021-09-27 RX ORDER — MORPHINE SULFATE 2 MG/ML
2 INJECTION, SOLUTION INTRAMUSCULAR; INTRAVENOUS
Status: CANCELLED
Start: 2021-10-01

## 2021-09-27 RX ORDER — HEPARIN SODIUM (PORCINE) LOCK FLUSH IV SOLN 100 UNIT/ML 100 UNIT/ML
5 SOLUTION INTRAVENOUS
Status: CANCELLED | OUTPATIENT
Start: 2021-10-01

## 2021-09-27 RX ORDER — HEPARIN SODIUM,PORCINE 10 UNIT/ML
5 VIAL (ML) INTRAVENOUS
Status: CANCELLED | OUTPATIENT
Start: 2021-10-01

## 2021-09-27 RX ORDER — OXYCODONE HYDROCHLORIDE 15 MG/1
TABLET ORAL
Qty: 50 TABLET | Refills: 0 | Status: SHIPPED | OUTPATIENT
Start: 2021-09-28 | End: 2021-10-08

## 2021-09-27 RX ORDER — ONDANSETRON 8 MG/1
8 TABLET, FILM COATED ORAL
Status: CANCELLED
Start: 2021-10-01

## 2021-09-27 RX ORDER — DIPHENHYDRAMINE HCL 25 MG
25 CAPSULE ORAL
Status: CANCELLED
Start: 2021-10-01

## 2021-09-27 RX ORDER — HEPARIN SODIUM (PORCINE) LOCK FLUSH IV SOLN 100 UNIT/ML 100 UNIT/ML
5 SOLUTION INTRAVENOUS
Status: DISCONTINUED | OUTPATIENT
Start: 2021-09-27 | End: 2021-09-27 | Stop reason: HOSPADM

## 2021-09-27 RX ADMIN — MORPHINE SULFATE 2 MG: 2 INJECTION, SOLUTION INTRAMUSCULAR; INTRAVENOUS at 13:54

## 2021-09-27 RX ADMIN — Medication 5 ML: at 15:46

## 2021-09-27 RX ADMIN — DEXTROSE AND SODIUM CHLORIDE 500 ML: 5; 450 INJECTION, SOLUTION INTRAVENOUS at 12:55

## 2021-09-27 RX ADMIN — MORPHINE SULFATE 2 MG: 2 INJECTION, SOLUTION INTRAMUSCULAR; INTRAVENOUS at 12:52

## 2021-09-27 RX ADMIN — MORPHINE SULFATE 2 MG: 2 INJECTION, SOLUTION INTRAMUSCULAR; INTRAVENOUS at 14:52

## 2021-09-27 NOTE — TELEPHONE ENCOUNTER
Pt called in to triage requesting IVF pain meds for lower back and hips pain rated 9/10 x 1 day--started at midnight. Pain quality is sharp, stabbing.  This is  usual sickle cell pain.   Stated last took prn all pain this morning without relief.   Denied any fevers, chills, cough, sob, chest pain or other symptoms.   Pt's last infusion was 9/24, last clinic visit 9/20/2021 with follow-up on 10/18/2021 with Dr. Duncan.     Pt denied being out of home medications and needing any refills today. Would like to refill for it  To be ready Friday or Saturday.  Transportation: Does need ride. Pt is 10-15 minutes away. Medication pended up and routed to provider to sign.    Pt had infusion on Friday, so MD needs to be paged.  Paged Dr. Duncan at 1008: Ok to come for infusion.    Pt added to the wait list.     1115: Ange is able to get Jennifer in at 12 noon. Called pt who states she can be here at 12 noon and can arrange her own transportation. Notified Ange Torres Charge that pt can be here at 12 noon. IB sent to scheduling to schedule.

## 2021-09-27 NOTE — PROGRESS NOTES
Infusion Nursing Note:  Jennifer Cervantes presents today for IV fluids + pain medications.    Patient seen by provider today: No   present during visit today: Not Applicable.    Note: Jennifer presents today with 9/10 lower back and hip pain, which she states is characteristic of her sickle cell pain. Denies nausea/vomiting. Denies fevers/chills. Denies SOB, cough, chest pain, or dizziness/lightheadedness. Denies constipation/diarrhea. Denies urinary issues. Denies neuropathy. Offers no new concerns at this appointment.    Jennifer received 3 doses of morphine 2 mg for pain and rated her pain as 5/10 at time of discharge. She feels comfortable managing her pain at home, and has a ride set up to bring her home.      Intravenous Access:  Implanted Port.    Treatment Conditions:  Not Applicable.      Post Infusion Assessment:  Patient tolerated infusion without incident.  Blood return noted pre and post infusion.  Site patent and intact, free from redness, edema or discomfort.  No evidence of extravasations.  Access discontinued per protocol.       Discharge Plan:   Patient declined prescription refills.  Discharge instructions reviewed with: Patient.  Patient and/or family verbalized understanding of discharge instructions and all questions answered.  AVS to patient via [employer].  Patient will return 10/20 for next infusion appointment.   Patient discharged in stable condition accompanied by: self.  Departure Mode: Ambulatory.      Gretchen Cruz RN

## 2021-09-27 NOTE — PATIENT INSTRUCTIONS
[facility] Triage Nurse Line: [phone]    Call triage nurse with chills and/or temperature greater than or equal to 100.4, uncontrolled nausea/vomiting, diarrhea, constipation, dizziness, shortness of breath, chest pain, bleeding, unexplained bruising, or any new/concerning symptoms, questions/concerns.     If you are having any concerning symptoms or wish to speak to a provider before your next infusion visit, please call your care coordinator or triage to notify them so we can adequately serve you.

## 2021-09-29 ENCOUNTER — LAB (OUTPATIENT)
Dept: LAB | Facility: CLINIC | Age: 21
End: 2021-09-29
Attending: PEDIATRICS
Payer: COMMERCIAL

## 2021-09-29 ENCOUNTER — PATIENT OUTREACH (OUTPATIENT)
Dept: CARE COORDINATION | Facility: CLINIC | Age: 21
End: 2021-09-29

## 2021-09-29 ENCOUNTER — HOME INFUSION (PRE-WILLOW HOME INFUSION) (OUTPATIENT)
Dept: PHARMACY | Facility: CLINIC | Age: 21
End: 2021-09-29

## 2021-09-29 ENCOUNTER — INFUSION THERAPY VISIT (OUTPATIENT)
Dept: TRANSPLANT | Facility: CLINIC | Age: 21
End: 2021-09-29
Attending: PEDIATRICS
Payer: COMMERCIAL

## 2021-09-29 ENCOUNTER — TELEPHONE (OUTPATIENT)
Dept: ONCOLOGY | Facility: CLINIC | Age: 21
End: 2021-09-29

## 2021-09-29 ENCOUNTER — NURSE TRIAGE (OUTPATIENT)
Dept: ONCOLOGY | Facility: CLINIC | Age: 21
End: 2021-09-29

## 2021-09-29 VITALS
WEIGHT: 170 LBS | DIASTOLIC BLOOD PRESSURE: 76 MMHG | TEMPERATURE: 98.3 F | BODY MASS INDEX: 29.18 KG/M2 | HEART RATE: 110 BPM | OXYGEN SATURATION: 98 % | SYSTOLIC BLOOD PRESSURE: 127 MMHG | RESPIRATION RATE: 16 BRPM

## 2021-09-29 DIAGNOSIS — D57.1 HB-SS DISEASE WITHOUT CRISIS (H): ICD-10-CM

## 2021-09-29 DIAGNOSIS — D57.00 SICKLE CELL PAIN CRISIS (H): ICD-10-CM

## 2021-09-29 DIAGNOSIS — G81.10 SPASTIC HEMIPLEGIA, UNSPECIFIED ETIOLOGY, UNSPECIFIED LATERALITY (H): Primary | ICD-10-CM

## 2021-09-29 LAB
ALBUMIN SERPL-MCNC: 4 G/DL (ref 3.4–5)
ALP SERPL-CCNC: 82 U/L (ref 40–150)
ALT SERPL W P-5'-P-CCNC: 60 U/L (ref 0–50)
ANION GAP SERPL CALCULATED.3IONS-SCNC: 6 MMOL/L (ref 3–14)
AST SERPL W P-5'-P-CCNC: 84 U/L (ref 0–45)
BASOPHILS # BLD MANUAL: 0.2 10E3/UL (ref 0–0.2)
BASOPHILS NFR BLD MANUAL: 2 %
BILIRUB SERPL-MCNC: 4.9 MG/DL (ref 0.2–1.3)
BUN SERPL-MCNC: 6 MG/DL (ref 7–30)
CALCIUM SERPL-MCNC: 8.5 MG/DL (ref 8.5–10.1)
CHLORIDE BLD-SCNC: 112 MMOL/L (ref 94–109)
CO2 SERPL-SCNC: 22 MMOL/L (ref 20–32)
CREAT SERPL-MCNC: 0.57 MG/DL (ref 0.52–1.04)
EOSINOPHIL # BLD MANUAL: 0.3 10E3/UL (ref 0–0.7)
EOSINOPHIL NFR BLD MANUAL: 3 %
ERYTHROCYTE [DISTWIDTH] IN BLOOD BY AUTOMATED COUNT: 26 % (ref 10–15)
GFR SERPL CREATININE-BSD FRML MDRD: >90 ML/MIN/1.73M2
GLUCOSE BLD-MCNC: 90 MG/DL (ref 70–99)
HCT VFR BLD AUTO: 22.4 % (ref 35–47)
HGB BLD-MCNC: 8 G/DL (ref 11.7–15.7)
LYMPHOCYTES # BLD MANUAL: 2.3 10E3/UL (ref 0.8–5.3)
LYMPHOCYTES NFR BLD MANUAL: 20 %
MCH RBC QN AUTO: 33.3 PG (ref 26.5–33)
MCHC RBC AUTO-ENTMCNC: 35.7 G/DL (ref 31.5–36.5)
MCV RBC AUTO: 93 FL (ref 78–100)
MONOCYTES # BLD MANUAL: 0.6 10E3/UL (ref 0–1.3)
MONOCYTES NFR BLD MANUAL: 5 %
NEUTROPHILS # BLD MANUAL: 8.1 10E3/UL (ref 1.6–8.3)
NEUTROPHILS NFR BLD MANUAL: 70 %
NRBC # BLD AUTO: 2.9 10E3/UL
NRBC BLD MANUAL-RTO: 25 %
PLAT MORPH BLD: ABNORMAL
PLATELET # BLD AUTO: 301 10E3/UL (ref 150–450)
POLYCHROMASIA BLD QL SMEAR: ABNORMAL
POTASSIUM BLD-SCNC: 3.6 MMOL/L (ref 3.4–5.3)
PROT SERPL-MCNC: 8.1 G/DL (ref 6.8–8.8)
RBC # BLD AUTO: 2.4 10E6/UL (ref 3.8–5.2)
RBC MORPH BLD: ABNORMAL
RETICS # AUTO: 0.58 10E6/UL (ref 0.03–0.1)
RETICS/RBC NFR AUTO: 23.5 % (ref 0.5–2)
SICKLE CELLS BLD QL SMEAR: ABNORMAL
SODIUM SERPL-SCNC: 140 MMOL/L (ref 133–144)
TARGETS BLD QL SMEAR: SLIGHT
WBC # BLD AUTO: 11.6 10E3/UL (ref 4–11)

## 2021-09-29 PROCEDURE — 85027 COMPLETE CBC AUTOMATED: CPT

## 2021-09-29 PROCEDURE — 96374 THER/PROPH/DIAG INJ IV PUSH: CPT

## 2021-09-29 PROCEDURE — 250N000011 HC RX IP 250 OP 636: Performed by: PEDIATRICS

## 2021-09-29 PROCEDURE — 96361 HYDRATE IV INFUSION ADD-ON: CPT

## 2021-09-29 PROCEDURE — 82040 ASSAY OF SERUM ALBUMIN: CPT

## 2021-09-29 PROCEDURE — 258N000003 HC RX IP 258 OP 636: Performed by: PEDIATRICS

## 2021-09-29 PROCEDURE — 36591 DRAW BLOOD OFF VENOUS DEVICE: CPT

## 2021-09-29 PROCEDURE — 96376 TX/PRO/DX INJ SAME DRUG ADON: CPT

## 2021-09-29 PROCEDURE — 85045 AUTOMATED RETICULOCYTE COUNT: CPT | Performed by: PEDIATRICS

## 2021-09-29 RX ORDER — HEPARIN SODIUM,PORCINE 10 UNIT/ML
5 VIAL (ML) INTRAVENOUS
Status: CANCELLED | OUTPATIENT
Start: 2021-10-01

## 2021-09-29 RX ORDER — MORPHINE SULFATE 2 MG/ML
2 INJECTION, SOLUTION INTRAMUSCULAR; INTRAVENOUS
Status: CANCELLED
Start: 2021-10-01

## 2021-09-29 RX ORDER — HEPARIN SODIUM (PORCINE) LOCK FLUSH IV SOLN 100 UNIT/ML 100 UNIT/ML
5 SOLUTION INTRAVENOUS
Status: CANCELLED | OUTPATIENT
Start: 2021-10-01

## 2021-09-29 RX ORDER — MORPHINE SULFATE 2 MG/ML
2 INJECTION, SOLUTION INTRAMUSCULAR; INTRAVENOUS
Status: COMPLETED | OUTPATIENT
Start: 2021-09-29 | End: 2021-09-29

## 2021-09-29 RX ORDER — ONDANSETRON 8 MG/1
8 TABLET, FILM COATED ORAL
Status: CANCELLED
Start: 2021-10-01

## 2021-09-29 RX ORDER — DIPHENHYDRAMINE HCL 25 MG
25 CAPSULE ORAL
Status: CANCELLED
Start: 2021-10-01

## 2021-09-29 RX ORDER — HEPARIN SODIUM (PORCINE) LOCK FLUSH IV SOLN 100 UNIT/ML 100 UNIT/ML
5 SOLUTION INTRAVENOUS EVERY 8 HOURS PRN
Status: DISCONTINUED | OUTPATIENT
Start: 2021-09-29 | End: 2021-09-29 | Stop reason: HOSPADM

## 2021-09-29 RX ADMIN — DEXTROSE AND SODIUM CHLORIDE 1000 ML: 5; 450 INJECTION, SOLUTION INTRAVENOUS at 12:08

## 2021-09-29 RX ADMIN — MORPHINE SULFATE 2 MG: 2 INJECTION, SOLUTION INTRAMUSCULAR; INTRAVENOUS at 12:16

## 2021-09-29 RX ADMIN — Medication 5 ML: at 11:54

## 2021-09-29 RX ADMIN — MORPHINE SULFATE 2 MG: 2 INJECTION, SOLUTION INTRAMUSCULAR; INTRAVENOUS at 13:19

## 2021-09-29 RX ADMIN — MORPHINE SULFATE 2 MG: 2 INJECTION, SOLUTION INTRAMUSCULAR; INTRAVENOUS at 14:21

## 2021-09-29 ASSESSMENT — PAIN SCALES - GENERAL: PAINLEVEL: EXTREME PAIN (9)

## 2021-09-29 NOTE — TELEPHONE ENCOUNTER
"Jennifer called, wondering rationale for provider refilling oxycodone 15mg from 60 tablets to 50tablets that was recently signed and filled on 9/28/21.     This writer educated that since pt max is 6per day with goal of 4 per day, current 50tablets provides 1 weeks/8days worth and for pt to call back for triage to reassess pain at that time.     Jennifer still would like to hear from provider or RNCC Amanda.     Per Jennifer, \"does not like medication changing without prior notice and wishes to be included in changes rather than being surprised with change\"    Jennifer is aware RNCC is returning to clinic on Friday 10/1 so may get return call around that time.     Routed to Dr. Duncan and RNCC Amanda.       "

## 2021-09-29 NOTE — PROGRESS NOTES
Social Work Follow-Up  [email or web address]    Data/Intervention:  Patient Name:  Jennifer Cervantes  /Age:  2000 (21 year old)    Reason for Follow-Up:  Transportation     Collaborated With:    -[facility] [facility]  -[employer]  -Patient    Intervention/Education/Resources Provided:  SW received VM from [facility] Triage that patient is needing ride assistance for their 12 pm appointment. SW set up ride using taxi voucher due to timing. SW set up ride for patient with [employer]. SW called patient informed them of their ride and confirmed that they have the number to call for their return ride.     Assessment/Plan:  SW will remain available as needed.  Previously provided patient/family with writer's contact information and availability.       CARLOS Chavez,LGSW  Hematology/Oncology Social Worker  Phone:[phone] Pager: [phone]

## 2021-09-29 NOTE — TELEPHONE ENCOUNTER
[facility] Triage    Pt called in to triage requesting IVF pain meds for sickle cell pain rated 9/10  X 1 days. Stated last took prn oxycodone, oxycontin and tyl 2 hours ago this morning without relief. Denied any fevers, chills, cough, sob, chest pain or other symptoms. Pt's last infusion was 9/27, last clinic visit 9/20 with Dr. Duncan with follow-up on 10/8 with Dr. Pierce. Patient states they do not have own ride and it will take 15 minutes long to get to cancer clinic. Pt denied being out of home medications and needing any refills today. Pt does not qualify for sickle cell standing order protocol.    Paged Dr. Duncan at 1013 AM  Routing to Dr. Duncan and Amanda Duncan returned page:    Ok for IVF/pain meds and please get labs    BMT can Take Jennifer at noon    Jennifer wants that appt.    Scheduling notified and SW.  SW will set up a ride and contact Jennifer.    Routing to Dr. Duncan and Amanda Roth

## 2021-09-29 NOTE — LETTER
9/29/2021         RE: Jennifer Cervantes  [address]  [place] [address]        Dear Colleague,    Thank you for referring your patient, Jennifer Cervantes, to the [facility]. Please see a copy of my visit note below.    Infusion Nursing Note:  Jennifer Cervantes presents today for IVF and pain meds.    Patient seen by provider today: No   present during visit today: Not Applicable.    Note: Lab results monitored.    Intravenous Access:  Implanted Port.  Deaccessed prior to discharge.    Treatment Conditions:  500cc D5 1/2 NS given over 2 hours. 2mg Morphine given IVP x3 for max dose.    Post Infusion Assessment:  Patient tolerated infusion without incident.       Discharge Plan:   Patient discharged in stable condition accompanied by: self.      Allison Wiggins RN                          Again, thank you for allowing me to participate in the care of your patient.        Sincerely,        [facility]

## 2021-09-29 NOTE — PROGRESS NOTES
Infusion Nursing Note:  Jennifer Cervatnes presents today for IVF and pain meds.    Patient seen by provider today: No   present during visit today: Not Applicable.    Note: Lab results monitored.    Intravenous Access:  Implanted Port.  Deaccessed prior to discharge.    Treatment Conditions:  500cc D5 1/2 NS given over 2 hours. 2mg Morphine given IVP x3 for max dose.    Post Infusion Assessment:  Patient tolerated infusion without incident.       Discharge Plan:   Patient discharged in stable condition accompanied by: self.      Allison Wiggins RN

## 2021-09-29 NOTE — PROGRESS NOTES
This is a recent snapshot of the patient's [facility] medical record.  For current drug dose and complete information and questions, call [phone] or In Basket pool, [facility] home infusion ([id number])  CSN Number:  [id number]

## 2021-09-29 NOTE — TELEPHONE ENCOUNTER
FUTURE VISIT INFORMATION      FUTURE VISIT INFORMATION:    Date: 10/19/2021    Time: 9am    Location: [facility]   REFERRAL INFORMATION:    Referring provider:  JOSIANE Machado    Referring providers clinic:  [facility]     Reason for visit/diagnosis  History of Stroke, Migraines     RECORDS REQUESTED FROM:       Clinic name Comments Records Status Imaging Status   Internal JOSIANE Rodríguez-8/24/2021    MR Brain-10/12/2019 Epic PACS

## 2021-10-01 ENCOUNTER — TELEPHONE (OUTPATIENT)
Dept: ONCOLOGY | Facility: CLINIC | Age: 21
End: 2021-10-01

## 2021-10-01 ENCOUNTER — INFUSION THERAPY VISIT (OUTPATIENT)
Dept: ONCOLOGY | Facility: CLINIC | Age: 21
End: 2021-10-01
Attending: PEDIATRICS
Payer: COMMERCIAL

## 2021-10-01 VITALS
OXYGEN SATURATION: 92 % | RESPIRATION RATE: 18 BRPM | SYSTOLIC BLOOD PRESSURE: 127 MMHG | HEART RATE: 108 BPM | DIASTOLIC BLOOD PRESSURE: 90 MMHG | TEMPERATURE: 98.3 F

## 2021-10-01 DIAGNOSIS — G81.10 SPASTIC HEMIPLEGIA, UNSPECIFIED ETIOLOGY, UNSPECIFIED LATERALITY (H): Primary | ICD-10-CM

## 2021-10-01 DIAGNOSIS — D57.00 SICKLE CELL PAIN CRISIS (H): ICD-10-CM

## 2021-10-01 PROCEDURE — 96361 HYDRATE IV INFUSION ADD-ON: CPT

## 2021-10-01 PROCEDURE — 258N000003 HC RX IP 258 OP 636: Performed by: PEDIATRICS

## 2021-10-01 PROCEDURE — 250N000011 HC RX IP 250 OP 636: Performed by: PEDIATRICS

## 2021-10-01 PROCEDURE — 96374 THER/PROPH/DIAG INJ IV PUSH: CPT

## 2021-10-01 PROCEDURE — 96376 TX/PRO/DX INJ SAME DRUG ADON: CPT

## 2021-10-01 RX ORDER — ONDANSETRON 8 MG/1
8 TABLET, FILM COATED ORAL
Status: CANCELLED
Start: 2022-01-01

## 2021-10-01 RX ORDER — HEPARIN SODIUM (PORCINE) LOCK FLUSH IV SOLN 100 UNIT/ML 100 UNIT/ML
5 SOLUTION INTRAVENOUS
Status: CANCELLED | OUTPATIENT
Start: 2022-01-01

## 2021-10-01 RX ORDER — MORPHINE SULFATE 2 MG/ML
2 INJECTION, SOLUTION INTRAMUSCULAR; INTRAVENOUS
Status: CANCELLED
Start: 2022-01-01

## 2021-10-01 RX ORDER — HEPARIN SODIUM (PORCINE) LOCK FLUSH IV SOLN 100 UNIT/ML 100 UNIT/ML
5 SOLUTION INTRAVENOUS
Status: DISCONTINUED | OUTPATIENT
Start: 2021-10-01 | End: 2021-10-01 | Stop reason: HOSPADM

## 2021-10-01 RX ORDER — HEPARIN SODIUM,PORCINE 10 UNIT/ML
5 VIAL (ML) INTRAVENOUS
Status: DISCONTINUED | OUTPATIENT
Start: 2021-10-01 | End: 2021-10-01 | Stop reason: HOSPADM

## 2021-10-01 RX ORDER — MORPHINE SULFATE 2 MG/ML
2 INJECTION, SOLUTION INTRAMUSCULAR; INTRAVENOUS
Status: COMPLETED | OUTPATIENT
Start: 2021-10-01 | End: 2021-10-01

## 2021-10-01 RX ORDER — DIPHENHYDRAMINE HCL 25 MG
25 CAPSULE ORAL
Status: CANCELLED
Start: 2022-01-01

## 2021-10-01 RX ORDER — HEPARIN SODIUM,PORCINE 10 UNIT/ML
5 VIAL (ML) INTRAVENOUS
Status: CANCELLED | OUTPATIENT
Start: 2022-01-01

## 2021-10-01 RX ADMIN — Medication 5 ML: at 16:23

## 2021-10-01 RX ADMIN — MORPHINE SULFATE 2 MG: 2 INJECTION, SOLUTION INTRAMUSCULAR; INTRAVENOUS at 13:58

## 2021-10-01 RX ADMIN — MORPHINE SULFATE 2 MG: 2 INJECTION, SOLUTION INTRAMUSCULAR; INTRAVENOUS at 15:49

## 2021-10-01 RX ADMIN — MORPHINE SULFATE 2 MG: 2 INJECTION, SOLUTION INTRAMUSCULAR; INTRAVENOUS at 14:58

## 2021-10-01 RX ADMIN — DEXTROSE AND SODIUM CHLORIDE 500 ML: 5; 450 INJECTION, SOLUTION INTRAVENOUS at 13:55

## 2021-10-01 ASSESSMENT — PAIN SCALES - GENERAL: PAINLEVEL: EXTREME PAIN (9)

## 2021-10-01 NOTE — PROGRESS NOTES
Infusion Nursing Note:  Jennifer Cervantes presents today for IVF and Pain medications.    Patient seen by provider today: No   present during visit today: Not Applicable.    Note: Jennifer presents to infusion today in sickle cell crisis rating her pain a 9/10. She states this is a generalized pain all over and has not taken any pain medications since 0800 this morning without relief. After 3 doses of IV Morphine, pt rated her pain a 5/10 and stated this is a comfortable pain level for her to discharge at. She denied any dizziness or lightheadedness.     Intravenous Access:  PORT    Treatment Conditions:  Lab Results   Component Value Date    HGB 8.0 (L) 09/29/2021    WBC 11.6 (H) 09/29/2021    ANEU 8.1 09/29/2021    ANEUTAUTO 8.0 09/12/2021     09/29/2021      Lab Results   Component Value Date     09/29/2021    POTASSIUM 3.6 09/29/2021    MAG 1.7 02/21/2021    CR 0.57 09/29/2021    MICAH 8.5 09/29/2021    BILITOTAL 4.9 (H) 09/29/2021    ALBUMIN 4.0 09/29/2021    ALT 60 (H) 09/29/2021    AST 84 (H) 09/29/2021         Post Infusion Assessment:  Patient tolerated infusion without incident.  Blood return noted pre and post infusion.  Site patent and intact, free from redness, edema or discomfort.  No evidence of extravasations.  Access discontinued per protocol.       Discharge Plan:   Patient declined prescription refills.  AVS to patient via [employer].  Patient will return 10/18/21 for visit with Dr. Duncan and 10/20/21 for next infusion appointment.   Patient discharged in stable condition accompanied by: self.  Departure Mode: Ambulatory.  Face to Face time: 0.      Anne Molina RN

## 2021-10-01 NOTE — PATIENT INSTRUCTIONS
Contact Numbers  [facility]: [phone] (for symptom and scheduling needs)    Please call the [facility] Triage line if you experience a temperature greater than or equal to 100.4, shaking chills, have uncontrolled nausea, vomiting and/or diarrhea, dizziness, shortness of breath, chest pain, bleeding, unexplained bruising, or if you have any other new/concerning symptoms, questions or concerns.     If you are having any concerning symptoms or wish to speak to a provider before your next infusion visit, please call your care coordinator or triage to notify them so we can adequately serve you.     If you need a refill on a narcotic prescription or other medication, please call triage before your infusion appointment.        Lab Results:  No results found for this or any previous visit (from the past 12 hour(s)).

## 2021-10-01 NOTE — CONFIDENTIAL NOTE
13:00 appointment is available at [facility] for IVF/Pain.   Confirmed appointment with Jennifer.   Message sent to scheduling to schedule appointment and paged  to assist in setting up ride for appointment.

## 2021-10-01 NOTE — TELEPHONE ENCOUNTER
[facility] Telephone Triage Note    The following symptoms were reported:   Typical  Description:            Onset:  Last night  Location: back, legs and arms, general body   Character: Sharp pain           Intensity: 9/10    Accompanying Signs & Symptoms:  none    Chest Pain:  denies     Shortness of Breath:  denies     Fever:  denies     Chills:  denies   Cough/sore throat:  denies  Other:  No other symptoms     Needs transportation   Therapies Tried and outcome: Last oxycodone taken at 5:00am    Improved by: heat with minimal improvement    The following provider was consulted:  7:11am Dr. Duncan  8:43am repaged Dr. Duncan  8:49am IVF/Plan approved if appt available.     The following advice/orders were given, and/or interventions recommended:     Patient instructions and/or follow up:

## 2021-10-04 ENCOUNTER — INFUSION THERAPY VISIT (OUTPATIENT)
Dept: ONCOLOGY | Facility: CLINIC | Age: 21
End: 2021-10-04
Attending: PEDIATRICS
Payer: COMMERCIAL

## 2021-10-04 ENCOUNTER — NURSE TRIAGE (OUTPATIENT)
Dept: ONCOLOGY | Facility: CLINIC | Age: 21
End: 2021-10-04

## 2021-10-04 VITALS
DIASTOLIC BLOOD PRESSURE: 84 MMHG | RESPIRATION RATE: 18 BRPM | TEMPERATURE: 98 F | HEART RATE: 109 BPM | SYSTOLIC BLOOD PRESSURE: 123 MMHG | OXYGEN SATURATION: 93 %

## 2021-10-04 DIAGNOSIS — G81.10 SPASTIC HEMIPLEGIA, UNSPECIFIED ETIOLOGY, UNSPECIFIED LATERALITY (H): Primary | ICD-10-CM

## 2021-10-04 DIAGNOSIS — D57.00 SICKLE CELL PAIN CRISIS (H): ICD-10-CM

## 2021-10-04 PROCEDURE — 258N000003 HC RX IP 258 OP 636: Performed by: PEDIATRICS

## 2021-10-04 PROCEDURE — 96376 TX/PRO/DX INJ SAME DRUG ADON: CPT

## 2021-10-04 PROCEDURE — 96361 HYDRATE IV INFUSION ADD-ON: CPT

## 2021-10-04 PROCEDURE — 96374 THER/PROPH/DIAG INJ IV PUSH: CPT

## 2021-10-04 PROCEDURE — 250N000011 HC RX IP 250 OP 636: Performed by: PEDIATRICS

## 2021-10-04 RX ORDER — HEPARIN SODIUM (PORCINE) LOCK FLUSH IV SOLN 100 UNIT/ML 100 UNIT/ML
5 SOLUTION INTRAVENOUS
Status: CANCELLED | OUTPATIENT
Start: 2022-01-01

## 2021-10-04 RX ORDER — HEPARIN SODIUM,PORCINE 10 UNIT/ML
5 VIAL (ML) INTRAVENOUS
Status: CANCELLED | OUTPATIENT
Start: 2022-01-01

## 2021-10-04 RX ORDER — HEPARIN SODIUM (PORCINE) LOCK FLUSH IV SOLN 100 UNIT/ML 100 UNIT/ML
5 SOLUTION INTRAVENOUS
Status: DISCONTINUED | OUTPATIENT
Start: 2021-10-04 | End: 2021-10-04 | Stop reason: HOSPADM

## 2021-10-04 RX ORDER — ONDANSETRON 8 MG/1
8 TABLET, FILM COATED ORAL
Status: CANCELLED
Start: 2022-01-01

## 2021-10-04 RX ORDER — DIPHENHYDRAMINE HCL 25 MG
25 CAPSULE ORAL
Status: CANCELLED
Start: 2022-01-01

## 2021-10-04 RX ORDER — MORPHINE SULFATE 2 MG/ML
2 INJECTION, SOLUTION INTRAMUSCULAR; INTRAVENOUS
Status: CANCELLED
Start: 2022-01-01

## 2021-10-04 RX ORDER — MORPHINE SULFATE 2 MG/ML
2 INJECTION, SOLUTION INTRAMUSCULAR; INTRAVENOUS
Status: COMPLETED | OUTPATIENT
Start: 2021-10-04 | End: 2021-10-04

## 2021-10-04 RX ADMIN — MORPHINE SULFATE 2 MG: 2 INJECTION, SOLUTION INTRAMUSCULAR; INTRAVENOUS at 12:00

## 2021-10-04 RX ADMIN — DEXTROSE AND SODIUM CHLORIDE 500 ML: 5; 450 INJECTION, SOLUTION INTRAVENOUS at 10:59

## 2021-10-04 RX ADMIN — MORPHINE SULFATE 2 MG: 2 INJECTION, SOLUTION INTRAMUSCULAR; INTRAVENOUS at 12:57

## 2021-10-04 RX ADMIN — Medication 5 ML: at 13:38

## 2021-10-04 RX ADMIN — MORPHINE SULFATE 2 MG: 2 INJECTION, SOLUTION INTRAMUSCULAR; INTRAVENOUS at 10:59

## 2021-10-04 ASSESSMENT — PAIN SCALES - GENERAL: PAINLEVEL: EXTREME PAIN (9)

## 2021-10-04 NOTE — PATIENT INSTRUCTIONS
[facility] Triage and after hours / weekends / holidays:  [phone]    Please call the triage or after hours line if you experience a temperature greater than or equal to 100.5, shaking chills, have uncontrolled nausea, vomiting and/or diarrhea, dizziness, shortness of breath, chest pain, bleeding, unexplained bruising, or if you have any other new/concerning symptoms, questions or concerns.      If you are having any concerning symptoms or wish to speak to a provider before your next infusion visit, please call your care coordinator or triage to notify them so we can adequately serve you.     If you need a refill on a narcotic prescription or other medication, please call before your infusion appointment.                 October 2021 Sunday Monday Tuesday Wednesday Thursday Friday Saturday                            1    ONC INFUSION 2 HR (120 MIN)   1:00 PM   (120 min.)    ONC INFUSION NURSE   [facility] 2       3     4    ONC INFUSION 2 HR (120 MIN)  11:00 AM   (120 min.)    ONC INFUSION NURSE   [facility] 5     6    [facility] RETURN   1:20 PM   (40 min.)   Eric Alvarado MD   [facility] 7     8    RETURN   7:45 AM   (60 min.)   Katherine Pierce MD   [facility] 9       10     11     12     13     14     15     16       17     18    LAB CENTRAL  11:15 AM   (15 min.)   [facility] DRAW   [facility]    RETURN  11:45 AM   (30 min.)   Eric Duncan MD   [facility] 19    VIDEO VISIT NEW   8:45 AM   (60 min.)   Jonas Cervantes MD   [facility] 20    ONC INFUSION 3 HR (180 MIN)   9:00 AM   (180 min.)    ONC INFUSION NURSE   [facility] 21     22     23       24     25     26     27     28     29     30       31                                                November 2021 Sunday Monday Tuesday Wednesday Thursday Friday Saturday        1     2     3     4     5     6       7     8     9     10     11     12     13       14     15     16     17     18     19     20       21     22     23     24    [facility] NURSE VISIT   9:00 AM   (30 min.)   Nurse, [facility] [place] 25     26     27       28     29     30                                      No results found for this or any previous visit (from the past 24 hour(s)).

## 2021-10-04 NOTE — PROGRESS NOTES
Infusion Nursing Note:  Jennifer Cervantes presents today for IVF and pain medication   Patient seen by provider today: No   present during visit today: Not Applicable.    Note: Pt assessed upon arrival to infusion; having generalized sickle pain 9/10 which has not been relieved by her home pain medication regimen.  Pt denies fever, chills, SOB, or cough; no other changes or concerns noted.    Reviewed today's plan of care with patient.     Pt received morphine x 3 with good effect; at time of discharge pt was rating pain 5/10.  Pt denied further interventions at this time.    Intravenous Access:   Implanted Port.    Treatment Conditions:  Not Applicable.      Post Infusion Assessment:  Patient tolerated infusion without incident.  Blood return noted pre and post infusion.  Site patent and intact, free from redness, edema or discomfort.  No evidence of extravasations.  Access discontinued per protocol.       Discharge Plan:   Patient declined prescription refills.  Discharge instructions reviewed with: Patient.  Patient and/or family verbalized understanding of discharge instructions and all questions answered.  AVS to patient via [employer].  Patient will return 10/18 for follow up with Dr. Duncan and 10/20  for next infusion appointment.   Patient discharged in stable condition accompanied by: self.  Departure Mode: Ambulatory.    Claudia Batista RN

## 2021-10-04 NOTE — TELEPHONE ENCOUNTER
Pt called in to triage requesting IVF pain meds for sickle cell pain rated 8/10  X 6 hours. Stated last took prn Oxycondone, oxycontin and tyl this morning without relief at 545 am. Denied any fevers, chills, cough, sob, chest pain or other symptoms. Pt's last infusion was 10/1/21, last clinic visit 9/20 with no follow up. Patient states they do not have own ride and it will take 15 minutes long to get to cancer clinic. Pt denied being out of home medications and needing any refills today. Pt does not qualifies for sickle cell standing order protocol.    Page Dr. Duncan - 720 am  Routing to Dr. Duncan and RNCC

## 2021-10-06 ENCOUNTER — PATIENT OUTREACH (OUTPATIENT)
Dept: CARE COORDINATION | Facility: CLINIC | Age: 21
End: 2021-10-06

## 2021-10-06 ENCOUNTER — TELEPHONE (OUTPATIENT)
Dept: ONCOLOGY | Facility: CLINIC | Age: 21
End: 2021-10-06

## 2021-10-06 ENCOUNTER — INFUSION THERAPY VISIT (OUTPATIENT)
Dept: ONCOLOGY | Facility: CLINIC | Age: 21
End: 2021-10-06
Attending: PEDIATRICS
Payer: COMMERCIAL

## 2021-10-06 VITALS
TEMPERATURE: 98.6 F | OXYGEN SATURATION: 92 % | DIASTOLIC BLOOD PRESSURE: 82 MMHG | RESPIRATION RATE: 16 BRPM | HEART RATE: 107 BPM | SYSTOLIC BLOOD PRESSURE: 129 MMHG

## 2021-10-06 DIAGNOSIS — G81.10 SPASTIC HEMIPLEGIA, UNSPECIFIED ETIOLOGY, UNSPECIFIED LATERALITY (H): Primary | ICD-10-CM

## 2021-10-06 DIAGNOSIS — D57.00 SICKLE CELL PAIN CRISIS (H): ICD-10-CM

## 2021-10-06 PROCEDURE — 96361 HYDRATE IV INFUSION ADD-ON: CPT

## 2021-10-06 PROCEDURE — 96376 TX/PRO/DX INJ SAME DRUG ADON: CPT

## 2021-10-06 PROCEDURE — 250N000011 HC RX IP 250 OP 636: Performed by: PEDIATRICS

## 2021-10-06 PROCEDURE — 258N000003 HC RX IP 258 OP 636: Performed by: PEDIATRICS

## 2021-10-06 PROCEDURE — 96374 THER/PROPH/DIAG INJ IV PUSH: CPT

## 2021-10-06 RX ORDER — ONDANSETRON 8 MG/1
8 TABLET, FILM COATED ORAL
Status: CANCELLED
Start: 2022-01-01

## 2021-10-06 RX ORDER — ONDANSETRON 8 MG/1
8 TABLET, ORALLY DISINTEGRATING ORAL
Status: DISCONTINUED | OUTPATIENT
Start: 2021-10-06 | End: 2021-10-06 | Stop reason: HOSPADM

## 2021-10-06 RX ORDER — HEPARIN SODIUM,PORCINE 10 UNIT/ML
5 VIAL (ML) INTRAVENOUS
Status: CANCELLED | OUTPATIENT
Start: 2022-01-01

## 2021-10-06 RX ORDER — DIPHENHYDRAMINE HCL 25 MG
25 CAPSULE ORAL
Status: DISCONTINUED | OUTPATIENT
Start: 2021-10-06 | End: 2021-10-06 | Stop reason: HOSPADM

## 2021-10-06 RX ORDER — MORPHINE SULFATE 2 MG/ML
2 INJECTION, SOLUTION INTRAMUSCULAR; INTRAVENOUS
Status: COMPLETED | OUTPATIENT
Start: 2021-10-06 | End: 2021-10-06

## 2021-10-06 RX ORDER — DIPHENHYDRAMINE HCL 25 MG
25 CAPSULE ORAL
Status: CANCELLED
Start: 2022-01-01

## 2021-10-06 RX ORDER — HEPARIN SODIUM,PORCINE 10 UNIT/ML
5 VIAL (ML) INTRAVENOUS
Status: DISCONTINUED | OUTPATIENT
Start: 2021-10-06 | End: 2021-10-06 | Stop reason: HOSPADM

## 2021-10-06 RX ORDER — HEPARIN SODIUM (PORCINE) LOCK FLUSH IV SOLN 100 UNIT/ML 100 UNIT/ML
5 SOLUTION INTRAVENOUS
Status: CANCELLED | OUTPATIENT
Start: 2022-01-01

## 2021-10-06 RX ORDER — MORPHINE SULFATE 2 MG/ML
2 INJECTION, SOLUTION INTRAMUSCULAR; INTRAVENOUS
Status: CANCELLED
Start: 2022-01-01

## 2021-10-06 RX ORDER — HEPARIN SODIUM (PORCINE) LOCK FLUSH IV SOLN 100 UNIT/ML 100 UNIT/ML
5 SOLUTION INTRAVENOUS
Status: DISCONTINUED | OUTPATIENT
Start: 2021-10-06 | End: 2021-10-06 | Stop reason: HOSPADM

## 2021-10-06 RX ADMIN — MORPHINE SULFATE 2 MG: 2 INJECTION, SOLUTION INTRAMUSCULAR; INTRAVENOUS at 09:36

## 2021-10-06 RX ADMIN — MORPHINE SULFATE 2 MG: 2 INJECTION, SOLUTION INTRAMUSCULAR; INTRAVENOUS at 11:46

## 2021-10-06 RX ADMIN — DEXTROSE AND SODIUM CHLORIDE 500 ML: 5; 450 INJECTION, SOLUTION INTRAVENOUS at 09:35

## 2021-10-06 RX ADMIN — Medication 5 ML: at 12:15

## 2021-10-06 RX ADMIN — MORPHINE SULFATE 2 MG: 2 INJECTION, SOLUTION INTRAMUSCULAR; INTRAVENOUS at 10:43

## 2021-10-06 ASSESSMENT — PAIN SCALES - GENERAL: PAINLEVEL: EXTREME PAIN (8)

## 2021-10-06 NOTE — PATIENT INSTRUCTIONS
[facility] Triage and after hours / weekends / holidays:  [phone]    Please call the triage or after hours line if you experience a temperature greater than or equal to 100.5, shaking chills, have uncontrolled nausea, vomiting and/or diarrhea, dizziness, shortness of breath, chest pain, bleeding, unexplained bruising, or if you have any other new/concerning symptoms, questions or concerns.      If you are having any concerning symptoms or wish to speak to a provider before your next infusion visit, please call your care coordinator or triage to notify them so we can adequately serve you.     If you need a refill on a narcotic prescription or other medication, please call before your infusion appointment.

## 2021-10-06 NOTE — PROGRESS NOTES
Infusion Nursing Note:  Jennifer Cervantes presents today for IV fluids and Pain Medications.        Note:    Jennifer comes in today with back pain rating at a 8/10.  She describes her pain as sharp and is consistent with her normal sickle cell pain.  Three doses of morphine were given.  Prior to discharge pain was 6/10.  Jennifer said she feels comfortable continuing to manage her pain at home    She has been afebrile and denies signs and symptoms of infection including: cough, SOB, sore throat, diarrhea, vomiting, rash, or pain with urination.      Intravenous Access:  Implanted Port.      Discharge Plan:   Patient declined prescription refills.  AVS to patient via [employer].  Patient will return 10/18/21 to see Dr Duncan in clinic  Marina Leblanc RN

## 2021-10-06 NOTE — TELEPHONE ENCOUNTER
S: Sickle Cell Pain    B: Pt called in to triage requesting IVF pain meds for back pain rated 8/10 x since evening yesterday days. Pain quality is sharp.  This is usual sickle cell pain.   Stated last took prn oxycodone and oxycontin this morning at 6 a.m. without relief.   Denied any fevers, chills, cough, sob, chest pain or other symptoms.   Pt's last infusion was 10/4/21, last clinic visit 9/20/21 with follow-up on 10/18/21 with Dr. Duncan.   Pt qualifies for sickle cell standing order protocol.  Pt denied being out of home medications and needing any refills today.   Transportation: Doesneed ride. Pt is 15 minutes away.    A: Paged Dr. Duncan at 0809--Ok for [facility] to come in. No labs needed.    Call -- will provide pt with a taxi voucher as apt is available at 0900 in [facility] per Dargan.    IB sent to scheduling.    R: Routed to Dr. Duncan and RNCC.

## 2021-10-06 NOTE — PROGRESS NOTES
Social Work Follow-Up  [facility]    Data/Intervention:  Patient Name:  Jennifer Cervantes  /Age:  2000 (21 year old)    Reason for Follow-Up:  Transportation    Collaborated With:    -[employer] [facility]riage  -[employer]  -Patient    Intervention/Education/Resources Provided:  Patient is needing ride assistance for their 9 am appointment this morning. Due to timing, SW used a taxi voucher. Ride was arranged through [employer] ([phone]) and patient was notified.     Assessment/Plan:  SW will remain available as needed.  Previously provided patient/family with writer's contact information and availability.       CARLOS Chavez,LGSW  Hematology/Oncology Social Worker  Phone:[phone] Pager: [phone]

## 2021-10-07 ENCOUNTER — INFUSION THERAPY VISIT (OUTPATIENT)
Dept: TRANSPLANT | Facility: CLINIC | Age: 21
End: 2021-10-07
Attending: PEDIATRICS
Payer: COMMERCIAL

## 2021-10-07 ENCOUNTER — PATIENT OUTREACH (OUTPATIENT)
Dept: CARE COORDINATION | Facility: CLINIC | Age: 21
End: 2021-10-07

## 2021-10-07 ENCOUNTER — TELEPHONE (OUTPATIENT)
Dept: ONCOLOGY | Facility: CLINIC | Age: 21
End: 2021-10-07

## 2021-10-07 VITALS
TEMPERATURE: 98.2 F | RESPIRATION RATE: 16 BRPM | HEART RATE: 70 BPM | DIASTOLIC BLOOD PRESSURE: 78 MMHG | OXYGEN SATURATION: 94 % | SYSTOLIC BLOOD PRESSURE: 135 MMHG

## 2021-10-07 DIAGNOSIS — G81.10 SPASTIC HEMIPLEGIA, UNSPECIFIED ETIOLOGY, UNSPECIFIED LATERALITY (H): Primary | ICD-10-CM

## 2021-10-07 DIAGNOSIS — D57.00 SICKLE CELL PAIN CRISIS (H): ICD-10-CM

## 2021-10-07 PROCEDURE — 258N000003 HC RX IP 258 OP 636: Performed by: PEDIATRICS

## 2021-10-07 PROCEDURE — 250N000011 HC RX IP 250 OP 636: Performed by: PEDIATRICS

## 2021-10-07 PROCEDURE — 96374 THER/PROPH/DIAG INJ IV PUSH: CPT

## 2021-10-07 PROCEDURE — 96361 HYDRATE IV INFUSION ADD-ON: CPT

## 2021-10-07 PROCEDURE — 96376 TX/PRO/DX INJ SAME DRUG ADON: CPT

## 2021-10-07 RX ORDER — ONDANSETRON 8 MG/1
8 TABLET, FILM COATED ORAL
Status: CANCELLED
Start: 2022-01-01

## 2021-10-07 RX ORDER — HEPARIN SODIUM,PORCINE 10 UNIT/ML
5 VIAL (ML) INTRAVENOUS
Status: CANCELLED | OUTPATIENT
Start: 2022-01-01

## 2021-10-07 RX ORDER — HEPARIN SODIUM (PORCINE) LOCK FLUSH IV SOLN 100 UNIT/ML 100 UNIT/ML
5 SOLUTION INTRAVENOUS
Status: CANCELLED | OUTPATIENT
Start: 2022-01-01

## 2021-10-07 RX ORDER — MORPHINE SULFATE 2 MG/ML
2 INJECTION, SOLUTION INTRAMUSCULAR; INTRAVENOUS
Status: CANCELLED
Start: 2022-01-01

## 2021-10-07 RX ORDER — MORPHINE SULFATE 2 MG/ML
2 INJECTION, SOLUTION INTRAMUSCULAR; INTRAVENOUS
Status: DISCONTINUED | OUTPATIENT
Start: 2021-10-07 | End: 2021-10-07 | Stop reason: HOSPADM

## 2021-10-07 RX ORDER — DIPHENHYDRAMINE HCL 25 MG
25 CAPSULE ORAL
Status: CANCELLED
Start: 2022-01-01

## 2021-10-07 RX ADMIN — MORPHINE SULFATE 2 MG: 2 INJECTION, SOLUTION INTRAMUSCULAR; INTRAVENOUS at 14:29

## 2021-10-07 RX ADMIN — MORPHINE SULFATE 2 MG: 2 INJECTION, SOLUTION INTRAMUSCULAR; INTRAVENOUS at 15:27

## 2021-10-07 RX ADMIN — DEXTROSE AND SODIUM CHLORIDE 500 ML: 5; 450 INJECTION, SOLUTION INTRAVENOUS at 13:28

## 2021-10-07 RX ADMIN — MORPHINE SULFATE 2 MG: 2 INJECTION, SOLUTION INTRAMUSCULAR; INTRAVENOUS at 13:30

## 2021-10-07 ASSESSMENT — PAIN SCALES - GENERAL: PAINLEVEL: EXTREME PAIN (8)

## 2021-10-07 NOTE — TELEPHONE ENCOUNTER
[facility]    Repeat refill request -     Copied from Claudia MARTIN's note from this am:  Pt denied being out of home medications but would like to request refill of oxycodone to be filled so she can  on Sunday.     Patient hung up on writer.

## 2021-10-07 NOTE — NURSING NOTE
"Oncology Rooming Note    October 7, 2021 1:33 PM   Jennifer Cervantes is a 21 year old female who presents for:    Chief Complaint   Patient presents with     Infusion     add-on infusion for sickle cell crisis     Initial Vitals: /78   Pulse 70   Temp 98.2  F (36.8  C)   Resp 16   SpO2 94%  Estimated body mass index is 29.18 kg/m  as calculated from the following:    Height as of 9/20/21: 1.626 m (5' 4\").    Weight as of 9/29/21: 77.1 kg (170 lb). There is no height or weight on file to calculate BSA.  Extreme Pain (8) Comment: Data Unavailable   No LMP recorded. Patient has had an injection.  Allergies reviewed: Yes  Medications reviewed: Yes    Medications: Medication refills not needed today.  Pharmacy name entered into [facility]:    [facility] - [place], [place] - [address] [facility] [id number]  [facility] - [place], [place] - [address] [place] [address]    Clinical concerns: Patient denies fevers/N/V/D/respiratory symptoms.  She has 8/10 sickle cell pain.    ELINA WINTER RN              "

## 2021-10-07 NOTE — PROGRESS NOTES
,Infusion Nursing Note:  Jennifer Cervantes presents today for add-on infusion.    Patient seen by provider today: No  Treatment today was okayed by Dr Duncan   present during visit today: Not Applicable.    Note: No labs ordered for today.      Intravenous Access:  Implanted Port.    Treatment Conditions:  Patient received one litr D51/2NS over two hours.  She received three 2 mg IV push Morphine each spaced one hour apart.      Post Infusion Assessment:  Patient tolerated infusion without incident and had partial relief from her pain. Patient was satisfied with today's treatment.      Discharge Plan:   Patient discharged in stable condition accompanied by: self.  Patient's mother is picking her up at clinic front door.      ELINA WINTER RN

## 2021-10-07 NOTE — PROGRESS NOTES
This is a recent snapshot of the patient's [facility] medical record.  For current drug dose and complete information and questions, call [phone] or In Basket pool, fv home infusion ([id number])  CSN Number:  [id number]

## 2021-10-07 NOTE — LETTER
10/7/2021         RE: Jennifer Cervantes  [address]  [place] [address]        Dear Colleague,    Thank you for referring your patient, Jennifer Cervantes, to the [facility] [place]. Please see a copy of my visit note below.    ,Infusion Nursing Note:  Jennifer Cervantes presents today for add-on infusion.    Patient seen by provider today: No  Treatment today was okayed by Dr Duncan   present during visit today: Not Applicable.    Note: No labs ordered for today.      Intravenous Access:  Implanted Port.    Treatment Conditions:  Patient received one litr D51/2NS over two hours.  She received three 2 mg IV push Morphine each spaced one hour apart.      Post Infusion Assessment:  Patient tolerated infusion without incident and had partial relief from her pain. Patient was satisfied with today's treatment.      Discharge Plan:   Patient discharged in stable condition accompanied by: self.  Patient's mother is picking her up at clinic front door.      ELINA WINTER RN                          Again, thank you for allowing me to participate in the care of your patient.        Sincerely,        [facility]

## 2021-10-07 NOTE — TELEPHONE ENCOUNTER
Pt called in to triage requesting IVF pain meds for lower back pain rated 8/10 x since yesterday and did not get much relief from IV meds. days. Pain quality is sharp.  This is usual sickle cell pain.   Stated last took prn oxycodone and oxycontin at 6:30 a.m.  this morning without relief.   Denied any fevers, chills, cough, sob, chest pain or other symptoms.   Pt's last infusion was 10/6, last clinic visit 9/20/21 with follow-up on 10/18/21 with Dr. Duncan.   Pt denied being out of home medications but would like to request refill of oxycodone to be filled so she can  on Sunday.    Transportation: Does need ride. Pt is 10-15 minutes away.    Pt does not qualify for sickle cell standing order protocol. Paged Dr. Duncan. Ok per Dr. Duncan, no labs needed.    BMT able to take her at 1:00.  Called [employer] who will arrange ride.  Called Jennifer who confirmed she is able to come today at 1300.  IB sent to scheduling.  Updated Infusion Charge nurse.    Routed to Dr. Duncan and RNCC.

## 2021-10-07 NOTE — PROGRESS NOTES
Social Work Follow-Up  [facility]    Data/Intervention:  Patient Name:  Jennifer Cervantes  /Age:  2000 (21 year old)    Reason for Follow-Up:  Transportation    Collaborated With:    -Elroy Taylor RN  -[facility] Ride  -Patient  Intervention/Education/Resources Provided:  SW received phone call from Triage that patient is needing a ride for their 1 pm appointment today. SW called patients insurance and arranged ride. [employer] ([phone]) will  patient around 12:20 pm. Patient will need to call once they are done with their appointment. SW called patient and informed them of their ride details, patient verbalized understanding.    Assessment/Plan:  SW will remain available as needed.  Previously provided patient/family with writer's contact information and availability.       CARLOS Chavez,SW  Hematology/Oncology Social Worker  Phone:[phone] Pager: [phone]

## 2021-10-08 ENCOUNTER — INFUSION THERAPY VISIT (OUTPATIENT)
Dept: INFUSION THERAPY | Facility: CLINIC | Age: 21
End: 2021-10-08
Attending: PEDIATRICS
Payer: COMMERCIAL

## 2021-10-08 VITALS
SYSTOLIC BLOOD PRESSURE: 122 MMHG | RESPIRATION RATE: 16 BRPM | DIASTOLIC BLOOD PRESSURE: 76 MMHG | HEART RATE: 108 BPM | TEMPERATURE: 97.9 F | OXYGEN SATURATION: 92 %

## 2021-10-08 DIAGNOSIS — D57.00 SICKLE CELL CRISIS (H): Primary | ICD-10-CM

## 2021-10-08 DIAGNOSIS — G81.10 SPASTIC HEMIPLEGIA, UNSPECIFIED ETIOLOGY, UNSPECIFIED LATERALITY (H): ICD-10-CM

## 2021-10-08 DIAGNOSIS — D57.00 SICKLE CELL PAIN CRISIS (H): ICD-10-CM

## 2021-10-08 PROCEDURE — 250N000011 HC RX IP 250 OP 636: Performed by: PEDIATRICS

## 2021-10-08 PROCEDURE — 258N000003 HC RX IP 258 OP 636: Performed by: PEDIATRICS

## 2021-10-08 PROCEDURE — 96376 TX/PRO/DX INJ SAME DRUG ADON: CPT

## 2021-10-08 PROCEDURE — 96374 THER/PROPH/DIAG INJ IV PUSH: CPT

## 2021-10-08 PROCEDURE — 96361 HYDRATE IV INFUSION ADD-ON: CPT

## 2021-10-08 RX ORDER — HEPARIN SODIUM (PORCINE) LOCK FLUSH IV SOLN 100 UNIT/ML 100 UNIT/ML
5 SOLUTION INTRAVENOUS
Status: DISCONTINUED | OUTPATIENT
Start: 2021-10-08 | End: 2021-10-08 | Stop reason: HOSPADM

## 2021-10-08 RX ORDER — OXYCODONE HYDROCHLORIDE 15 MG/1
TABLET ORAL
Qty: 50 TABLET | Refills: 0 | Status: CANCELLED | OUTPATIENT
Start: 2021-10-08

## 2021-10-08 RX ORDER — ONDANSETRON 8 MG/1
8 TABLET, FILM COATED ORAL
Status: CANCELLED
Start: 2022-01-01

## 2021-10-08 RX ORDER — HEPARIN SODIUM,PORCINE 10 UNIT/ML
5 VIAL (ML) INTRAVENOUS
Status: CANCELLED | OUTPATIENT
Start: 2022-01-01

## 2021-10-08 RX ORDER — OXYCODONE HYDROCHLORIDE 15 MG/1
TABLET ORAL
Qty: 50 TABLET | Refills: 0 | Status: SHIPPED | OUTPATIENT
Start: 2021-10-08 | End: 2021-10-19

## 2021-10-08 RX ORDER — HEPARIN SODIUM,PORCINE 10 UNIT/ML
5 VIAL (ML) INTRAVENOUS
Status: DISCONTINUED | OUTPATIENT
Start: 2021-10-08 | End: 2021-10-08 | Stop reason: HOSPADM

## 2021-10-08 RX ORDER — DIPHENHYDRAMINE HCL 25 MG
25 CAPSULE ORAL
Status: CANCELLED
Start: 2022-01-01

## 2021-10-08 RX ORDER — HEPARIN SODIUM (PORCINE) LOCK FLUSH IV SOLN 100 UNIT/ML 100 UNIT/ML
5 SOLUTION INTRAVENOUS
Status: CANCELLED | OUTPATIENT
Start: 2022-01-01

## 2021-10-08 RX ORDER — MORPHINE SULFATE 2 MG/ML
2 INJECTION, SOLUTION INTRAMUSCULAR; INTRAVENOUS
Status: DISCONTINUED | OUTPATIENT
Start: 2021-10-08 | End: 2021-10-08 | Stop reason: HOSPADM

## 2021-10-08 RX ORDER — MORPHINE SULFATE 2 MG/ML
2 INJECTION, SOLUTION INTRAMUSCULAR; INTRAVENOUS
Status: CANCELLED
Start: 2022-01-01

## 2021-10-08 RX ADMIN — HEPARIN 5 ML: 100 SYRINGE at 12:10

## 2021-10-08 RX ADMIN — MORPHINE SULFATE 2 MG: 2 INJECTION, SOLUTION INTRAMUSCULAR; INTRAVENOUS at 10:48

## 2021-10-08 RX ADMIN — MORPHINE SULFATE 2 MG: 2 INJECTION, SOLUTION INTRAMUSCULAR; INTRAVENOUS at 11:49

## 2021-10-08 RX ADMIN — MORPHINE SULFATE 2 MG: 2 INJECTION, SOLUTION INTRAMUSCULAR; INTRAVENOUS at 09:48

## 2021-10-08 RX ADMIN — DEXTROSE AND SODIUM CHLORIDE 500 ML: 5; 450 INJECTION, SOLUTION INTRAVENOUS at 09:41

## 2021-10-08 ASSESSMENT — PAIN SCALES - GENERAL: PAINLEVEL: EXTREME PAIN (9)

## 2021-10-08 NOTE — PROGRESS NOTES
Infusion Nursing Note:  Jennifer Cervantes presents today for fluids and pain meds.    Patient seen by provider today: No   present during visit today: Not Applicable.    Note: Pt received 3 doses of Morphine 2mg, 1 hour apart.      Intravenous Access:  Implanted Port.    Treatment Conditions:  Pt began with pain at a 9/10 and at the end of treatment reported a pain number of 5/10.      Post Infusion Assessment:  Patient tolerated infusion without incident.  Patient observed for 30 minutes post treatment per protocol.  Blood return noted pre and post infusion.  Site patent and intact, free from redness, edema or discomfort.  No evidence of extravasations.       Discharge Plan:   Patient and/or family verbalized understanding of discharge instructions and all questions answered.  Patient discharged in stable condition accompanied by: self.  Departure Mode: Ambulatory.      DRAKE PORTER RN

## 2021-10-08 NOTE — TELEPHONE ENCOUNTER
Jennifer is calling to request that her oxycodone be refilled because she will be out of medication this weekend.     Pt states she is taking med every 6 hours most of the time and every 4 hours on occasion.     Pt requesting that oxycodone be filled so she can pick it up on the weekend because she will be out by Sunday.    Pt requesting RX be changed so she can have 60 tablets dispensed instead of 50 tablets because she does not like to call that often for refills.    Pt was scheduled to see Dr. Pierce today but rescheduled that apt to 10/13.     Medication Pended and Routed to Dr. Duncan. RNCC cc'd.    Pt called in to triage requesting IVF pain meds for generalized pain rated 8/10 x 8 hours days. Pain quality is sharp.  This is usual sickle cell pain.   Stated last took prn oxycodone, oxycontin this morning 7 a.m. without relief.   Denied any fevers, chills, cough, sob, chest pain or other symptoms.   Pt's last infusion was 10/7/21--did not feel better afterwards, last clinic visit 9/20/21 with follow-up on 10/18/21 with Dr. Duncan.   Pt approved for infusion by Dr. Duncan.  Pt will be out of home oxycodone medication and needs refills today.   Transportation: Does need ride. Pt is 15 minutes away.    Added to wait list.  Can be [facility] at [facility]  Paged SW for assistance.  Jennifer called to let Triage know she would be arranging her own ride to [place] and will be there in 15-20 minutes.  Updated Charge nurse, Oksana, informed SW that pt will be arranging her own ride to [facility], sent IB to scheduling to assist with scheduling.     Routed to Dr. Duncan and pt's care team.

## 2021-10-11 ENCOUNTER — NURSE TRIAGE (OUTPATIENT)
Dept: ONCOLOGY | Facility: CLINIC | Age: 21
End: 2021-10-11

## 2021-10-11 ENCOUNTER — INFUSION THERAPY VISIT (OUTPATIENT)
Dept: ONCOLOGY | Facility: CLINIC | Age: 21
End: 2021-10-11
Attending: PEDIATRICS
Payer: COMMERCIAL

## 2021-10-11 VITALS
TEMPERATURE: 98.2 F | HEART RATE: 104 BPM | RESPIRATION RATE: 16 BRPM | DIASTOLIC BLOOD PRESSURE: 87 MMHG | SYSTOLIC BLOOD PRESSURE: 127 MMHG | OXYGEN SATURATION: 91 %

## 2021-10-11 DIAGNOSIS — D57.00 SICKLE CELL PAIN CRISIS (H): ICD-10-CM

## 2021-10-11 DIAGNOSIS — G81.10 SPASTIC HEMIPLEGIA, UNSPECIFIED ETIOLOGY, UNSPECIFIED LATERALITY (H): Primary | ICD-10-CM

## 2021-10-11 PROCEDURE — 258N000003 HC RX IP 258 OP 636: Performed by: PEDIATRICS

## 2021-10-11 PROCEDURE — 250N000011 HC RX IP 250 OP 636: Performed by: PEDIATRICS

## 2021-10-11 PROCEDURE — 96361 HYDRATE IV INFUSION ADD-ON: CPT

## 2021-10-11 PROCEDURE — 96376 TX/PRO/DX INJ SAME DRUG ADON: CPT

## 2021-10-11 PROCEDURE — 96374 THER/PROPH/DIAG INJ IV PUSH: CPT

## 2021-10-11 RX ORDER — ONDANSETRON 8 MG/1
8 TABLET, FILM COATED ORAL
Status: CANCELLED
Start: 2022-01-01

## 2021-10-11 RX ORDER — HEPARIN SODIUM,PORCINE 10 UNIT/ML
5 VIAL (ML) INTRAVENOUS
Status: CANCELLED | OUTPATIENT
Start: 2022-01-01

## 2021-10-11 RX ORDER — MORPHINE SULFATE 2 MG/ML
2 INJECTION, SOLUTION INTRAMUSCULAR; INTRAVENOUS
Status: COMPLETED | OUTPATIENT
Start: 2021-10-11 | End: 2021-10-11

## 2021-10-11 RX ORDER — MORPHINE SULFATE 2 MG/ML
2 INJECTION, SOLUTION INTRAMUSCULAR; INTRAVENOUS
Status: CANCELLED
Start: 2022-01-01

## 2021-10-11 RX ORDER — HEPARIN SODIUM (PORCINE) LOCK FLUSH IV SOLN 100 UNIT/ML 100 UNIT/ML
5 SOLUTION INTRAVENOUS
Status: DISCONTINUED | OUTPATIENT
Start: 2021-10-11 | End: 2021-10-11 | Stop reason: HOSPADM

## 2021-10-11 RX ORDER — HEPARIN SODIUM (PORCINE) LOCK FLUSH IV SOLN 100 UNIT/ML 100 UNIT/ML
5 SOLUTION INTRAVENOUS
Status: CANCELLED | OUTPATIENT
Start: 2022-01-01

## 2021-10-11 RX ORDER — DIPHENHYDRAMINE HCL 25 MG
25 CAPSULE ORAL
Status: CANCELLED
Start: 2022-01-01

## 2021-10-11 RX ADMIN — DEXTROSE AND SODIUM CHLORIDE 500 ML: 5; 450 INJECTION, SOLUTION INTRAVENOUS at 08:54

## 2021-10-11 RX ADMIN — Medication 5 ML: at 11:24

## 2021-10-11 RX ADMIN — MORPHINE SULFATE 2 MG: 2 INJECTION, SOLUTION INTRAMUSCULAR; INTRAVENOUS at 10:02

## 2021-10-11 RX ADMIN — MORPHINE SULFATE 2 MG: 2 INJECTION, SOLUTION INTRAMUSCULAR; INTRAVENOUS at 11:02

## 2021-10-11 RX ADMIN — MORPHINE SULFATE 2 MG: 2 INJECTION, SOLUTION INTRAMUSCULAR; INTRAVENOUS at 08:56

## 2021-10-11 NOTE — PROGRESS NOTES
Infusion Nursing Note:  Jennifer Cervantes presents today for IVF and Pain Medications.    Patient seen by provider today: No   present during visit today: Not Applicable.    Note: Pt arrives to infusion today with 8/10 low back pain and generalized pain. Denies any sob, cough or fevers. Offers no new complaints or complaints.     Intravenous Access:  Implanted Port.    Treatment Conditions:  Not Applicable.    Post Infusion Assessment:  Patient tolerated infusion without incident.  Blood return noted pre and post infusion.  Site patent and intact, free from redness, edema or discomfort.  No evidence of extravasations.  Access discontinued per protocol.     Discharge Plan:   Patient declined prescription refills.  AVS given to patient upon discharge.  Patient will return 10/18 for next appointment.   Patient discharged in stable condition accompanied by: self.  Departure Mode: Ambulatory.  Face to Face time: 0.      Zoraida Orozco RN

## 2021-10-11 NOTE — TELEPHONE ENCOUNTER
Pt called in to triage requesting IVF pain meds for sickle cell pain rated 8/10 x Saturday - 2 days. Stated last took prn 1.5 hours this morning without relief. Denied any fevers, chills, cough, sob, chest pain or other symptoms. Pt's last infusion was 10/8/21last clinic visit 9/20/21 with follow-up on 10/18 with Perla. Patient states they do not have own ride and it will take 15 - 20 long to get to cancer clinic. Pt denied being out of home medications and needing any refills today. Pt does not qualify for sickle cell standing order protocol. Paged Dr. Duncan and he is ok with Jennifer coming in today.    Jennifer Cervantes is getting a ride from her mom and will be in at 830 am.    Routing to  Dr. Duncan and Amanda Roth

## 2021-10-12 ENCOUNTER — INFUSION THERAPY VISIT (OUTPATIENT)
Dept: ONCOLOGY | Facility: CLINIC | Age: 21
End: 2021-10-12
Attending: PEDIATRICS
Payer: COMMERCIAL

## 2021-10-12 ENCOUNTER — PATIENT OUTREACH (OUTPATIENT)
Dept: CARE COORDINATION | Facility: CLINIC | Age: 21
End: 2021-10-12

## 2021-10-12 ENCOUNTER — TELEPHONE (OUTPATIENT)
Dept: ONCOLOGY | Facility: CLINIC | Age: 21
End: 2021-10-12

## 2021-10-12 VITALS
TEMPERATURE: 98.1 F | RESPIRATION RATE: 16 BRPM | HEART RATE: 100 BPM | SYSTOLIC BLOOD PRESSURE: 147 MMHG | DIASTOLIC BLOOD PRESSURE: 89 MMHG

## 2021-10-12 DIAGNOSIS — D57.00 SICKLE CELL PAIN CRISIS (H): ICD-10-CM

## 2021-10-12 DIAGNOSIS — G81.10 SPASTIC HEMIPLEGIA, UNSPECIFIED ETIOLOGY, UNSPECIFIED LATERALITY (H): Primary | ICD-10-CM

## 2021-10-12 PROCEDURE — 96361 HYDRATE IV INFUSION ADD-ON: CPT

## 2021-10-12 PROCEDURE — 96376 TX/PRO/DX INJ SAME DRUG ADON: CPT

## 2021-10-12 PROCEDURE — 96374 THER/PROPH/DIAG INJ IV PUSH: CPT

## 2021-10-12 PROCEDURE — 258N000003 HC RX IP 258 OP 636: Performed by: PEDIATRICS

## 2021-10-12 PROCEDURE — 250N000011 HC RX IP 250 OP 636: Performed by: PEDIATRICS

## 2021-10-12 RX ORDER — MORPHINE SULFATE 2 MG/ML
2 INJECTION, SOLUTION INTRAMUSCULAR; INTRAVENOUS
Status: DISCONTINUED | OUTPATIENT
Start: 2021-10-12 | End: 2021-10-12 | Stop reason: HOSPADM

## 2021-10-12 RX ORDER — DIPHENHYDRAMINE HCL 25 MG
25 CAPSULE ORAL
Status: CANCELLED
Start: 2022-01-01

## 2021-10-12 RX ORDER — MORPHINE SULFATE 2 MG/ML
2 INJECTION, SOLUTION INTRAMUSCULAR; INTRAVENOUS
Status: CANCELLED
Start: 2022-01-01

## 2021-10-12 RX ORDER — HEPARIN SODIUM,PORCINE 10 UNIT/ML
5 VIAL (ML) INTRAVENOUS
Status: CANCELLED | OUTPATIENT
Start: 2022-01-01

## 2021-10-12 RX ORDER — ONDANSETRON 8 MG/1
8 TABLET, FILM COATED ORAL
Status: CANCELLED
Start: 2022-01-01

## 2021-10-12 RX ORDER — HEPARIN SODIUM (PORCINE) LOCK FLUSH IV SOLN 100 UNIT/ML 100 UNIT/ML
5 SOLUTION INTRAVENOUS
Status: DISCONTINUED | OUTPATIENT
Start: 2021-10-12 | End: 2021-10-12 | Stop reason: HOSPADM

## 2021-10-12 RX ORDER — HEPARIN SODIUM (PORCINE) LOCK FLUSH IV SOLN 100 UNIT/ML 100 UNIT/ML
5 SOLUTION INTRAVENOUS
Status: CANCELLED | OUTPATIENT
Start: 2022-01-01

## 2021-10-12 RX ADMIN — DEXTROSE AND SODIUM CHLORIDE 500 ML: 5; 450 INJECTION, SOLUTION INTRAVENOUS at 12:03

## 2021-10-12 RX ADMIN — Medication 5 ML: at 14:27

## 2021-10-12 RX ADMIN — MORPHINE SULFATE 2 MG: 2 INJECTION, SOLUTION INTRAMUSCULAR; INTRAVENOUS at 12:03

## 2021-10-12 RX ADMIN — MORPHINE SULFATE 2 MG: 2 INJECTION, SOLUTION INTRAMUSCULAR; INTRAVENOUS at 13:10

## 2021-10-12 RX ADMIN — MORPHINE SULFATE 2 MG: 2 INJECTION, SOLUTION INTRAMUSCULAR; INTRAVENOUS at 14:02

## 2021-10-12 NOTE — TELEPHONE ENCOUNTER
[facility] Telephone Triage Note    The following symptoms were reported:     Description:            Onset:  A few hours ago  Location: lower back  Character: Sharp           Intensity: 8/10    Accompanying Signs & Symptoms:  none    Chest Pain:  no     Shortness of Breath:  no     Fever:  no     Chills:  no   Cough/sore throat:  no  Other:  no    Therapies Tried and outcome: oxycodone about an hour and a half ago    Improved by: nothing    Pt needs assistance with a ride   about one hour to get to clinic    The following provider was consulted:  Dr. Duncan paged at 0737    The following advice/orders were given, and/or interventions recommended:  Return page at 0739. Okay to come in for IVF/Pain, no labs needed per Dr. Duncan.    Patient instructions and/or follow up: infusion appointment with [employer] available at 1100. Social work notified to set up ride, pt is aware.    Message sent to  to schedule appointment.

## 2021-10-12 NOTE — PATIENT INSTRUCTIONS
[facility] Triage and after hours / weekends / holidays:  [phone]    Please call the triage or after hours line if you experience a temperature greater than or equal to 100.5, shaking chills, have uncontrolled nausea, vomiting and/or diarrhea, dizziness, shortness of breath, chest pain, bleeding, unexplained bruising, or if you have any other new/concerning symptoms, questions or concerns.      If you are having any concerning symptoms or wish to speak to a provider before your next infusion visit, please call your care coordinator or triage to notify them so we can adequately serve you.     If you need a refill on a narcotic prescription or other medication, please call before your infusion appointment.                 October 2021 Sunday Monday Tuesday Wednesday Thursday Friday Saturday                            1    ONC INFUSION 2 HR (120 MIN)   1:00 PM   (120 min.)    ONC INFUSION NURSE   [facility] 2       3     4    ONC INFUSION 2 HR (120 MIN)  11:00 AM   (120 min.)    ONC INFUSION NURSE   [facility] 5     6    ONC INFUSION 2 HR (120 MIN)   9:00 AM   (120 min.)    ONC INFUSION NURSE   [facility] 7    BMT INFUSION 2 HR (120 MIN)   1:00 PM   (120 min.)    BMT INFUSION NURSE   [facility] [place] 8    INFUSION 2.5 HR (150 MIN)   9:30 AM   (150 min.)   UR CH 04   [facility] 9       10     11    ONC INFUSION 2 HR (120 MIN)   8:30 AM   (120 min.)    ONC INFUSION NURSE   [facility] 12    ONC INFUSION 2 HR (120 MIN)  11:00 AM   (120 min.)    ONC INFUSION NURSE   [facility] 13    RETURN  12:15 PM   (60 min.)   Katherine Pierce MD   [facility] 14     15     16       17     18    LAB CENTRAL  11:15 AM   (15 min.)   [facility] LAB DRAW   [facility]  [facility]    RETURN  11:45 AM   (30 min.)   Eric Duncan MD   [facility] 19    VIDEO VISIT NEW   8:45 AM   (60 min.)   Jonas Cervantes MD   [facility] 20    ONC INFUSION 3 HR (180 MIN)   9:00 AM   (180 min.)    ONC INFUSION NURSE   [facility] 21     22     23       24     25     26     27     28     29     30       31 November 2021 Sunday Monday Tuesday Wednesday Thursday Friday Saturday        1     2     3     4     5     6       7     8     9     10     11     12     13       14     15     16     17     18     19     20       21     22     23     24    P NURSE VISIT   9:00 AM   (30 min.)   Nurse, Mildred Pcc   [facility] [place] 25     26     27       28     29     30                                      No results found for this or any previous visit (from the past 24 hour(s)).

## 2021-10-12 NOTE — PROGRESS NOTES
Social Work Follow-Up  [facility]    Data/Intervention:  Patient Name:  Jennifer Cervantes  /Age:  2000 (21 year old)    Reason for Follow-Up:  Transportation    Collaborated With:    -[employer]  -Patient    Intervention/Education/Resources Provided:  SW received message that patient has an 11 am appointment and is needing assistance with ride. SW called patient's insurance and arranged ride. [employer] ([phone]) will  patient between 10-10:30 am and patient will have to call for their return ride. SW called patient and relayed their ride details and patient verbalized understanding.     Assessment/Plan:  SW will remain available as needed.  Previously provided patient/family with writer's contact information and availability.       CARLOS Chavez,LGSW  Hematology/Oncology Social Worker  Phone:[phone] Pager: [phone]

## 2021-10-12 NOTE — PROGRESS NOTES
Infusion Nursing Note:  Jennifer Cervantes presents today for IVF and pain medications  Patient seen by provider today: No   present during visit today: Not Applicable.    Note: pt arrived to infusion clinic with 9/10 back and generalized sickle pain.  Pt denies fever, chills, SOB, or cough.  Pt reports she is experiencing her usual sickle pain.  Pt states her pain has been worse the last few days due to weather changes.    Reviewed today's plan of care with patient.    Morphine given x 3; at time of discharge pain was down to 5/10 and pt felt it was adequately controlled.  Pt declined any further interventions.    Intravenous Access:  Implanted Port.    Treatment Conditions:  Not Applicable.      Post Infusion Assessment:  Patient tolerated infusion without incident.  Blood return noted pre and post infusion.  Site patent and intact, free from redness, edema or discomfort.  No evidence of extravasations.  Access discontinued per protocol.       Discharge Plan:   Patient declined prescription refills.  Discharge instructions reviewed with: Patient.  Patient and/or family verbalized understanding of discharge instructions and all questions answered.  Copy of AVS reviewed with patient and/or family.  Patient will return 10/20 for next appointment.  Patient discharged in stable condition accompanied by: self.  Departure Mode: Ambulatory.    Claudia Batista RN

## 2021-10-13 ENCOUNTER — NURSE TRIAGE (OUTPATIENT)
Dept: ONCOLOGY | Facility: CLINIC | Age: 21
End: 2021-10-13

## 2021-10-13 ENCOUNTER — INFUSION THERAPY VISIT (OUTPATIENT)
Dept: ONCOLOGY | Facility: CLINIC | Age: 21
End: 2021-10-13
Payer: COMMERCIAL

## 2021-10-13 ENCOUNTER — ONCOLOGY VISIT (OUTPATIENT)
Dept: ONCOLOGY | Facility: CLINIC | Age: 21
End: 2021-10-13
Attending: STUDENT IN AN ORGANIZED HEALTH CARE EDUCATION/TRAINING PROGRAM
Payer: COMMERCIAL

## 2021-10-13 ENCOUNTER — HOME INFUSION (PRE-WILLOW HOME INFUSION) (OUTPATIENT)
Dept: PHARMACY | Facility: CLINIC | Age: 21
End: 2021-10-13

## 2021-10-13 VITALS
OXYGEN SATURATION: 91 % | DIASTOLIC BLOOD PRESSURE: 72 MMHG | RESPIRATION RATE: 17 BRPM | HEART RATE: 107 BPM | SYSTOLIC BLOOD PRESSURE: 115 MMHG | TEMPERATURE: 98.3 F

## 2021-10-13 VITALS
WEIGHT: 170.3 LBS | DIASTOLIC BLOOD PRESSURE: 81 MMHG | SYSTOLIC BLOOD PRESSURE: 136 MMHG | OXYGEN SATURATION: 91 % | HEIGHT: 64 IN | HEART RATE: 111 BPM | RESPIRATION RATE: 18 BRPM | TEMPERATURE: 98.7 F | BODY MASS INDEX: 29.08 KG/M2

## 2021-10-13 DIAGNOSIS — G81.10 SPASTIC HEMIPLEGIA, UNSPECIFIED ETIOLOGY, UNSPECIFIED LATERALITY (H): Primary | ICD-10-CM

## 2021-10-13 DIAGNOSIS — G81.10 SPASTIC HEMIPLEGIA, UNSPECIFIED ETIOLOGY, UNSPECIFIED LATERALITY (H): ICD-10-CM

## 2021-10-13 DIAGNOSIS — I69.351 HEMIPLEGIA AND HEMIPARESIS FOLLOWING CEREBRAL INFARCTION AFFECTING RIGHT DOMINANT SIDE (H): ICD-10-CM

## 2021-10-13 DIAGNOSIS — D57.00 SICKLE CELL PAIN CRISIS (H): ICD-10-CM

## 2021-10-13 DIAGNOSIS — R25.2 SPASTICITY: ICD-10-CM

## 2021-10-13 DIAGNOSIS — D57.00 SICKLE CELL CRISIS (H): Primary | ICD-10-CM

## 2021-10-13 PROCEDURE — 250N000011 HC RX IP 250 OP 636: Performed by: PEDIATRICS

## 2021-10-13 PROCEDURE — 64643 CHEMODENERV 1 EXTREM 1-4 EA: CPT | Performed by: STUDENT IN AN ORGANIZED HEALTH CARE EDUCATION/TRAINING PROGRAM

## 2021-10-13 PROCEDURE — 95874 GUIDE NERV DESTR NEEDLE EMG: CPT | Performed by: STUDENT IN AN ORGANIZED HEALTH CARE EDUCATION/TRAINING PROGRAM

## 2021-10-13 PROCEDURE — 96361 HYDRATE IV INFUSION ADD-ON: CPT

## 2021-10-13 PROCEDURE — 96372 THER/PROPH/DIAG INJ SC/IM: CPT | Performed by: STUDENT IN AN ORGANIZED HEALTH CARE EDUCATION/TRAINING PROGRAM

## 2021-10-13 PROCEDURE — 96376 TX/PRO/DX INJ SAME DRUG ADON: CPT | Mod: 59

## 2021-10-13 PROCEDURE — G0463 HOSPITAL OUTPT CLINIC VISIT: HCPCS

## 2021-10-13 PROCEDURE — 258N000003 HC RX IP 258 OP 636: Performed by: PEDIATRICS

## 2021-10-13 PROCEDURE — 64644 CHEMODENERV 1 EXTREM 5/> MUS: CPT

## 2021-10-13 PROCEDURE — 64644 CHEMODENERV 1 EXTREM 5/> MUS: CPT | Performed by: STUDENT IN AN ORGANIZED HEALTH CARE EDUCATION/TRAINING PROGRAM

## 2021-10-13 PROCEDURE — 96374 THER/PROPH/DIAG INJ IV PUSH: CPT

## 2021-10-13 PROCEDURE — 250N000011 HC RX IP 250 OP 636: Performed by: STUDENT IN AN ORGANIZED HEALTH CARE EDUCATION/TRAINING PROGRAM

## 2021-10-13 RX ORDER — MORPHINE SULFATE 2 MG/ML
2 INJECTION, SOLUTION INTRAMUSCULAR; INTRAVENOUS
Status: DISCONTINUED | OUTPATIENT
Start: 2021-10-13 | End: 2021-10-13 | Stop reason: HOSPADM

## 2021-10-13 RX ORDER — ONDANSETRON 8 MG/1
8 TABLET, FILM COATED ORAL
Status: CANCELLED
Start: 2022-01-01

## 2021-10-13 RX ORDER — HEPARIN SODIUM (PORCINE) LOCK FLUSH IV SOLN 100 UNIT/ML 100 UNIT/ML
5 SOLUTION INTRAVENOUS
Status: CANCELLED | OUTPATIENT
Start: 2022-01-01

## 2021-10-13 RX ORDER — MORPHINE SULFATE 2 MG/ML
2 INJECTION, SOLUTION INTRAMUSCULAR; INTRAVENOUS
Status: CANCELLED
Start: 2022-01-01

## 2021-10-13 RX ORDER — HEPARIN SODIUM (PORCINE) LOCK FLUSH IV SOLN 100 UNIT/ML 100 UNIT/ML
5 SOLUTION INTRAVENOUS
Status: DISCONTINUED | OUTPATIENT
Start: 2021-10-13 | End: 2021-10-13 | Stop reason: HOSPADM

## 2021-10-13 RX ORDER — HEPARIN SODIUM,PORCINE 10 UNIT/ML
5 VIAL (ML) INTRAVENOUS
Status: CANCELLED | OUTPATIENT
Start: 2022-01-01

## 2021-10-13 RX ORDER — DIPHENHYDRAMINE HCL 25 MG
25 CAPSULE ORAL
Status: CANCELLED
Start: 2022-01-01

## 2021-10-13 RX ADMIN — DEXTROSE AND SODIUM CHLORIDE 500 ML: 5; 450 INJECTION, SOLUTION INTRAVENOUS at 13:40

## 2021-10-13 RX ADMIN — ONABOTULINUMTOXINA 200 UNITS: 200 INJECTION, POWDER, LYOPHILIZED, FOR SOLUTION INTRADERMAL; INTRAMUSCULAR at 12:53

## 2021-10-13 RX ADMIN — MORPHINE SULFATE 2 MG: 2 INJECTION, SOLUTION INTRAMUSCULAR; INTRAVENOUS at 14:43

## 2021-10-13 RX ADMIN — MORPHINE SULFATE 2 MG: 2 INJECTION, SOLUTION INTRAMUSCULAR; INTRAVENOUS at 15:41

## 2021-10-13 RX ADMIN — Medication 5 ML: at 16:02

## 2021-10-13 RX ADMIN — MORPHINE SULFATE 2 MG: 2 INJECTION, SOLUTION INTRAMUSCULAR; INTRAVENOUS at 13:48

## 2021-10-13 ASSESSMENT — MIFFLIN-ST. JEOR: SCORE: 1517.48

## 2021-10-13 ASSESSMENT — PAIN SCALES - GENERAL: PAINLEVEL: EXTREME PAIN (8)

## 2021-10-13 NOTE — TELEPHONE ENCOUNTER
[facility] Triage    Incoming call: Last infusion 10/12/21    Pt called in to triage requesting IVF pain meds for lower back pain rated 8/10 x 1 day. Stated last took Oxcontin and Oxycodone at 6 am this without relief. Denied any fevers, chills, cough, sob, chest pain or other symptoms. Pt's last infusion was yesterday, last clinic visit 9/20 Perla with follow-up on 10/13 with Dr. Pierce and 10/18 with Perla. Patient states they do not have own ride and it will take 20 minutes to get to cancer clinic. Pt denied being out of home medications and needing any refills today. Pt does not qualify for sickle cell standing order protocol.    Paging Dr. Duncan 851 am  Routing to Dr. Duncan and Amanda Duncan returned call  Jennifer can come in for 3 consecutive IVF and pain meds without a call to Dr. Duncan.  After 3 consecutive appts, please page Dr. Duncan.      Mandie WHITE will call Jennifer as she can be seen in [facility] for an infusion.    Routing to Mandie Duncan

## 2021-10-13 NOTE — PROGRESS NOTES
Infusion Nursing Note:  Jennifer Cervantes presents today for IVF and pain medications  Patient seen by provider today: No   present during visit today: Not Applicable.    Note: Jennifer presents to infusion today in sickle cell crisis rating her lower back pain a 8/10. She endorses taking Oxycodone and Oxycontin at 0600 this morning without relief. After 3 doses of IV Morphine, pt reports her pain decreasing to a 5/10- she states this is a comfortable level for her to discharge home at. Denies dizziness or lightheadedness.    Intravenous Access:  Implanted Port.    Treatment Conditions:  NA    Post Infusion Assessment:  Patient tolerated infusion without incident.  Blood return noted pre and post infusion.  No evidence of extravasations.  Access discontinued per protocol.       Discharge Plan:   Patient declined prescription refills.  AVS to patient via [employer].  Patient next scheduled on 10/18 for a visit with Dr. Duncan and 10/20 for next infusion.  Patient discharged in stable condition accompanied by: self.  Departure Mode: Ambulatory.  Face to Face time: 0.      Anne Molina RN

## 2021-10-13 NOTE — PROGRESS NOTES
"PM&R Botox Procedure Note      Chief Complaint   Patient presents with     Oncology Clinic Visit     Sickle cell pain crisis        /81   Pulse 111   Temp 98.7  F (37.1  C) (Oral)   Resp 18   Ht 1.626 m (5' 4\")   Wt 77.2 kg (170 lb 4.8 oz)   SpO2 91%   BMI 29.23 kg/m         Current Outpatient Medications:      acetaminophen (TYLENOL) 325 MG tablet, Take 2 tablets (650 mg) by mouth every 6 hours as needed for mild pain, Disp: 120 tablet, Rfl: 3     albuterol (PROAIR HFA/PROVENTIL HFA/VENTOLIN HFA) 108 (90 Base) MCG/ACT inhaler, Inhale 2 puffs into the lungs every 6 hours as needed for shortness of breath / dyspnea or wheezing, Disp: 8.5 g, Rfl: 3     albuterol (PROVENTIL) (2.5 MG/3ML) 0.083% neb solution, Take 1 vial (2.5 mg) by nebulization every 6 hours as needed for shortness of breath / dyspnea or wheezing, Disp: 12 mL, Rfl: 4     ARIPiprazole (ABILIFY) 2 MG tablet, Take 1 tablet (2 mg) by mouth daily, Disp: 30 tablet, Rfl: 3     aspirin (ASA) 81 MG chewable tablet, Take 1 tablet (81 mg) by mouth daily, Disp: , Rfl:      budesonide-formoterol (SYMBICORT) 160-4.5 MCG/ACT Inhaler, Inhale 2 puffs into the lungs 2 times daily, Disp: 10.2 g, Rfl: 3     dabigatran ANTICOAGULANT (PRADAXA) 150 MG capsule, Take 1 capsule (150 mg) by mouth 2 times daily Store in original 's bottle or blister pack; use within 120 days of opening., Disp: 60 capsule, Rfl: 0     diclofenac (VOLTAREN) 1 % topical gel, Apply 4 g topically 4 times daily as needed for moderate pain Apply to back, legs, and arms for pain, Disp: 150 g, Rfl: 3     diphenhydrAMINE (BENADRYL) 25 MG capsule, Take 1-2 capsules (25-50 mg) by mouth nightly as needed for sleep, Disp: 60 capsule, Rfl: 3     DULoxetine (CYMBALTA) 30 MG capsule, Take 1 capsule (30 mg) by mouth daily for 7 days, THEN 1 capsule (30 mg) 2 times daily., Disp: 187 capsule, Rfl: 0     EPINEPHrine (ANY BX GENERIC EQUIV) 0.3 MG/0.3ML injection 2-pack, Inject 0.3 mLs (0.3 mg) " into the muscle as needed for anaphylaxis, Disp: 1 each, Rfl: 1     Hydroxyurea 1000 MG TABS, Take 2,000 mg by mouth daily, Disp: 60 tablet, Rfl: 3     hydrOXYzine (ATARAX) 25 MG tablet, Take 1 tablet (25 mg) by mouth 3 times daily as needed for anxiety, Disp: 30 tablet, Rfl: 1     JADENU 360 MG tablet, Take 4 tablets (1,440 mg) by mouth every evening, Disp: 120 tablet, Rfl: 4     naloxone (NARCAN) 4 MG/0.1ML nasal spray, Spray 1 spray (4 mg) into one nostril alternating nostrils once as needed for opioid reversal every 2-3 minutes until assistance arrives, Disp: 0.2 mL, Rfl: 1     omeprazole (PRILOSEC) 20 MG DR capsule, Take 1 capsule (20 mg) by mouth daily, Disp: 30 capsule, Rfl: 1     ondansetron (ZOFRAN) 8 MG tablet, Take 1 tablet (8 mg) by mouth every 8 hours as needed, Disp: 30 tablet, Rfl: 1     oxyCODONE (OXYCONTIN) 10 MG 12 hr tablet, Take 1 tablet (10 mg) by mouth every 12 hours, Disp: 60 tablet, Rfl: 0     oxyCODONE IR (ROXICODONE) 15 MG tablet, Take 1 tablet (15mg) by mouth every 4-6 hours as needed for severe pain. Goal 4 per day. Max 6 per day., Disp: 50 tablet, Rfl: 0     lidocaine-prilocaine (EMLA) 2.5-2.5 % external cream, Apply topically once for 1 dose Use for port site as needed, Disp: 30 g, Rfl: 1     medroxyPROGESTERone (DEPO-PROVERA) 150 MG/ML IM injection, Inject 150 mg into the muscle, Disp: , Rfl:     Current Facility-Administered Medications:      Botulinum Toxin Type A (BOTOX) 200 units injection 200 Units, 200 Units, Intramuscular, Q90 Days, Katherine Pierce MD, 200 Units at 10/13/21 1253     medroxyPROGESTERone (DEPO-PROVERA) syringe 150 mg, 150 mg, Intramuscular, Q90 Days, Suraj Case MD, 150 mg at 08/25/21 0856    Facility-Administered Medications Ordered in Other Visits:      heparin 100 UNIT/ML injection 5 mL, 5 mL, Intracatheter, Once PRN, Eric Duncan MD, 5 mL at 10/13/21 1602     morphine (PF) injection 2 mg, 2 mg, Intravenous, Q1H PRN, Perla,  Eric MARK MD, 2 mg at 10/13/21 1541     sodium chloride (PF) 0.9% PF flush 3-20 mL, 3-20 mL, Intracatheter, Q1H PRN, Eric Duncan MD, 10 mL at 10/13/21 1602        Allergies   Allergen Reactions     Contrast Dye      Hives and breathing issues     Fish-Derived Products Hives     Seafood Hives     Diagnostic X-Ray Materials      Gadolinium         PHYSICAL EXAM  Motor: 4+/5 with right shoulder abduction, 4/5 with right elbow flexion, unable to assess wrist flexion due to contracture. 4/5 with  strength on right. 5/5 left shoulder abduction, elbow extension, wrist extension and  strength/finger intrinsics. 4/5 with right hip flexion, 4/5 with right knee extension, 3/5 with right ankle dorsiflexion, 4+/5 with right EHL, plantar flexion. 5/5 with all muscle groups in left lower extremity.  ROM is 120 degrees with right shoulder abduction, about 130 degrees with right elbow extension.   Tone with MAS- 2/4 with right shoulder abduction, 3/4 with right finger flexors/intrinsics, 2/4 with right knee flexion. Significant right wrist contracture with minimal extension.  Sensory: intact to light touch and pinprick throughout all dermatomes in bilateral lower extremities.  Reflexes: intact, 2+ and symmetric in bilateral upper and lower extremities with biceps, triceps, BR, patellar and achilles.      HPI  Patient has been ok since her last visit. Continues to come in for frequent infusions to help with pain management and ongoing disease process. Continues with bracing to try to stretch out right upper extremity and continues range of motion exercises at home.    RESPONSE TO PREVIOUS TREATMENT:    N/A as this is her first treatment.    BOTULINUM NEUROTOXIN INJECTION PROCEDURES:    VERIFICATION OF PATIENT IDENTIFICATION  Responsible Person:  RUIZ  : verified  Full Name: verified    INDICATIONS FOR PROCEDURES:  Jennifer Cervantes is a 21 year old patient with spasticity affecting the right upper extremity and  right lower extremity secondary to a diagnosis of sickle cell disease and previous CVA with resulting spastic hemiparesis with associated pain, loss of joint motion, loss of volitional motor control, hygiene difficulty, difficulty with activities of daily living and difficulty with ambulation and transfers.    Her baseline symptoms have been recalcitrant to oral medications and conservative therapy.  She is here today for reinjection with Botox.    GOAL OF PROCEDURE:  The goal of this procedure is to improve increase active range of motion, improve volitional motor control, decrease pain  and enhance functional independence associated with spasticity.    TOTAL DOSE ADMINISTERED:  Dose Administered:  150 units Botox (Botulinum Toxin Type A)       1:1 Dilution     Diluent Used:  Preservative Free Normal Saline  Total Volume of Diluent Used:  1.5 ml  Lot # [id number] with Expiration Date:  6/2024    CONSENT:  The risks, benefits, and treatment options were discussed with Jennifer Cervantes and she agreed to proceed.    EQUIPMENT USED:  Needle-37mm stimulating/recording  EMG/NCS Machine    SKIN PREPARATION:  Skin preparation was performed using an alcohol wipe.      GUIDANCE DESCRIPTION:  Electro-myographic guidance was necessary throughout the procedure to accurately identify all areas of spastic muscles while avoiding injection of non-spastic muscles and underlying muscles , neighboring nerves and nearby vascular structures.     AREA/MUSCLE INJECTED:  Right biceps- 30 U at 2 sites  Right BR- 20 U  Right pronator teres- 30 U  Right FDS- 15 U  Right FCR- 15 U  Right biceps femoris- 40 U at 2 sites    RESPONSE TO PROCEDURE:  Jennifer Cervantes tolerated the procedure well and there were no immediate complications.   She was allowed to recover for an appropriate period of time and was discharged home in stable condition.     Follow Up  Jennifer Cervantes was asked to follow up by phone in 7-14 days with Dr. Pierce to report her response to  this series of injections or any concerns that may arise.  Jennifer NEWMAN Billy will be scheduled for the next series of injections in 12 weeks.

## 2021-10-13 NOTE — LETTER
"    10/13/2021         RE: Jennifer Cervantes  [address]  [place] [address]        Dear Colleague,    Thank you for referring your patient, Jennifer Cervantes, to the [facility]. Please see a copy of my visit note below.    PM&R Botox Procedure Note      Chief Complaint   Patient presents with     Oncology Clinic Visit     Sickle cell pain crisis        /81   Pulse 111   Temp 98.7  F (37.1  C) (Oral)   Resp 18   Ht 1.626 m (5' 4\")   Wt 77.2 kg (170 lb 4.8 oz)   SpO2 91%   BMI 29.23 kg/m         Current Outpatient Medications:      acetaminophen (TYLENOL) 325 MG tablet, Take 2 tablets (650 mg) by mouth every 6 hours as needed for mild pain, Disp: 120 tablet, Rfl: 3     albuterol (PROAIR HFA/PROVENTIL HFA/VENTOLIN HFA) 108 (90 Base) MCG/ACT inhaler, Inhale 2 puffs into the lungs every 6 hours as needed for shortness of breath / dyspnea or wheezing, Disp: 8.5 g, Rfl: 3     albuterol (PROVENTIL) (2.5 MG/3ML) 0.083% neb solution, Take 1 vial (2.5 mg) by nebulization every 6 hours as needed for shortness of breath / dyspnea or wheezing, Disp: 12 mL, Rfl: 4     ARIPiprazole (ABILIFY) 2 MG tablet, Take 1 tablet (2 mg) by mouth daily, Disp: 30 tablet, Rfl: 3     aspirin (ASA) 81 MG chewable tablet, Take 1 tablet (81 mg) by mouth daily, Disp: , Rfl:      budesonide-formoterol (SYMBICORT) 160-4.5 MCG/ACT Inhaler, Inhale 2 puffs into the lungs 2 times daily, Disp: 10.2 g, Rfl: 3     dabigatran ANTICOAGULANT (PRADAXA) 150 MG capsule, Take 1 capsule (150 mg) by mouth 2 times daily Store in original 's bottle or blister pack; use within 120 days of opening., Disp: 60 capsule, Rfl: 0     diclofenac (VOLTAREN) 1 % topical gel, Apply 4 g topically 4 times daily as needed for moderate pain Apply to back, legs, and arms for pain, Disp: 150 g, Rfl: 3     diphenhydrAMINE (BENADRYL) 25 MG capsule, Take 1-2 capsules (25-50 mg) by mouth nightly as needed for sleep, Disp: 60 capsule, " Rfl: 3     DULoxetine (CYMBALTA) 30 MG capsule, Take 1 capsule (30 mg) by mouth daily for 7 days, THEN 1 capsule (30 mg) 2 times daily., Disp: 187 capsule, Rfl: 0     EPINEPHrine (ANY BX GENERIC EQUIV) 0.3 MG/0.3ML injection 2-pack, Inject 0.3 mLs (0.3 mg) into the muscle as needed for anaphylaxis, Disp: 1 each, Rfl: 1     Hydroxyurea 1000 MG TABS, Take 2,000 mg by mouth daily, Disp: 60 tablet, Rfl: 3     hydrOXYzine (ATARAX) 25 MG tablet, Take 1 tablet (25 mg) by mouth 3 times daily as needed for anxiety, Disp: 30 tablet, Rfl: 1     JADENU 360 MG tablet, Take 4 tablets (1,440 mg) by mouth every evening, Disp: 120 tablet, Rfl: 4     naloxone (NARCAN) 4 MG/0.1ML nasal spray, Spray 1 spray (4 mg) into one nostril alternating nostrils once as needed for opioid reversal every 2-3 minutes until assistance arrives, Disp: 0.2 mL, Rfl: 1     omeprazole (PRILOSEC) 20 MG DR capsule, Take 1 capsule (20 mg) by mouth daily, Disp: 30 capsule, Rfl: 1     ondansetron (ZOFRAN) 8 MG tablet, Take 1 tablet (8 mg) by mouth every 8 hours as needed, Disp: 30 tablet, Rfl: 1     oxyCODONE (OXYCONTIN) 10 MG 12 hr tablet, Take 1 tablet (10 mg) by mouth every 12 hours, Disp: 60 tablet, Rfl: 0     oxyCODONE IR (ROXICODONE) 15 MG tablet, Take 1 tablet (15mg) by mouth every 4-6 hours as needed for severe pain. Goal 4 per day. Max 6 per day., Disp: 50 tablet, Rfl: 0     lidocaine-prilocaine (EMLA) 2.5-2.5 % external cream, Apply topically once for 1 dose Use for port site as needed, Disp: 30 g, Rfl: 1     medroxyPROGESTERone (DEPO-PROVERA) 150 MG/ML IM injection, Inject 150 mg into the muscle, Disp: , Rfl:     Current Facility-Administered Medications:      Botulinum Toxin Type A (BOTOX) 200 units injection 200 Units, 200 Units, Intramuscular, Q90 Days, Katherine Pierce MD, 200 Units at 10/13/21 1253     medroxyPROGESTERone (DEPO-PROVERA) syringe 150 mg, 150 mg, Intramuscular, Q90 Days, Suraj Case MD, 150 mg at 08/25/21  0856    Facility-Administered Medications Ordered in Other Visits:      heparin 100 UNIT/ML injection 5 mL, 5 mL, Intracatheter, Once PRN, Eric Duncan MD, 5 mL at 10/13/21 1602     morphine (PF) injection 2 mg, 2 mg, Intravenous, Q1H PRN, Eric Duncan MD, 2 mg at 10/13/21 1541     sodium chloride (PF) 0.9% PF flush 3-20 mL, 3-20 mL, Intracatheter, Q1H PRN, Eric Duncan MD, 10 mL at 10/13/21 1602        Allergies   Allergen Reactions     Contrast Dye      Hives and breathing issues     Fish-Derived Products Hives     Seafood Hives     Diagnostic X-Ray Materials      Gadolinium         PHYSICAL EXAM  Motor: 4+/5 with right shoulder abduction, 4/5 with right elbow flexion, unable to assess wrist flexion due to contracture. 4/5 with  strength on right. 5/5 left shoulder abduction, elbow extension, wrist extension and  strength/finger intrinsics. 4/5 with right hip flexion, 4/5 with right knee extension, 3/5 with right ankle dorsiflexion, 4+/5 with right EHL, plantar flexion. 5/5 with all muscle groups in left lower extremity.  ROM is 120 degrees with right shoulder abduction, about 130 degrees with right elbow extension.   Tone with MAS- 2/4 with right shoulder abduction, 3/4 with right finger flexors/intrinsics, 2/4 with right knee flexion. Significant right wrist contracture with minimal extension.  Sensory: intact to light touch and pinprick throughout all dermatomes in bilateral lower extremities.  Reflexes: intact, 2+ and symmetric in bilateral upper and lower extremities with biceps, triceps, BR, patellar and achilles.      HPI  Patient has been ok since her last visit. Continues to come in for frequent infusions to help with pain management and ongoing disease process. Continues with bracing to try to stretch out right upper extremity and continues range of motion exercises at home.    RESPONSE TO PREVIOUS TREATMENT:    N/A as this is her first treatment.    BOTULINUM  NEUROTOXIN INJECTION PROCEDURES:    VERIFICATION OF PATIENT IDENTIFICATION  Responsible Person:  RUIZ  : verified  Full Name: verified    INDICATIONS FOR PROCEDURES:  Jennifer Cervantes is a 21 year old patient with spasticity affecting the right upper extremity and right lower extremity secondary to a diagnosis of sickle cell disease and previous CVA with resulting spastic hemiparesis with associated pain, loss of joint motion, loss of volitional motor control, hygiene difficulty, difficulty with activities of daily living and difficulty with ambulation and transfers.    Her baseline symptoms have been recalcitrant to oral medications and conservative therapy.  She is here today for reinjection with Botox.    GOAL OF PROCEDURE:  The goal of this procedure is to improve increase active range of motion, improve volitional motor control, decrease pain  and enhance functional independence associated with spasticity.    TOTAL DOSE ADMINISTERED:  Dose Administered:  150 units Botox (Botulinum Toxin Type A)       1:1 Dilution     Diluent Used:  Preservative Free Normal Saline  Total Volume of Diluent Used:  1.5 ml  Lot # [id number] with Expiration Date:  2024    CONSENT:  The risks, benefits, and treatment options were discussed with Jennifer Cervantes and she agreed to proceed.    EQUIPMENT USED:  Needle-37mm stimulating/recording  EMG/NCS Machine    SKIN PREPARATION:  Skin preparation was performed using an alcohol wipe.      GUIDANCE DESCRIPTION:  Electro-myographic guidance was necessary throughout the procedure to accurately identify all areas of spastic muscles while avoiding injection of non-spastic muscles and underlying muscles , neighboring nerves and nearby vascular structures.     AREA/MUSCLE INJECTED:  Right biceps- 30 U at 2 sites  Right BR- 20 U  Right pronator teres- 30 U  Right FDS- 15 U  Right FCR- 15 U  Right biceps femoris- 40 U at 2 sites    RESPONSE TO PROCEDURE:  Jennifer Cervantes tolerated the procedure well and  there were no immediate complications.   She was allowed to recover for an appropriate period of time and was discharged home in stable condition.     Follow Up  Jennifer Cervantes was asked to follow up by phone in 7-14 days with Dr. Pierce to report her response to this series of injections or any concerns that may arise.  Jennifer Cervantes will be scheduled for the next series of injections in 12 weeks.                    Again, thank you for allowing me to participate in the care of your patient.        Sincerely,        Katherine Pierce MD

## 2021-10-13 NOTE — NURSING NOTE
"Oncology Rooming Note    October 13, 2021 11:46 AM   Jennifer Cervantes is a 21 year old female who presents for:    Chief Complaint   Patient presents with     Oncology Clinic Visit     Sickle cell pain crisis      Initial Vitals: /81   Pulse 111   Temp 98.7  F (37.1  C) (Oral)   Resp 18   Ht 1.626 m (5' 4\")   Wt 77.2 kg (170 lb 4.8 oz)   SpO2 91%   BMI 29.23 kg/m   Estimated body mass index is 29.23 kg/m  as calculated from the following:    Height as of this encounter: 1.626 m (5' 4\").    Weight as of this encounter: 77.2 kg (170 lb 4.8 oz). Body surface area is 1.87 meters squared.  Extreme Pain (8) Comment: Data Unavailable   No LMP recorded. Patient has had an injection.  Allergies reviewed: Yes  Medications reviewed: Yes    Medications: Medication refills not needed today.  Pharmacy name entered into [facility]: [facility] - [place], [place] - [address]    Clinical concerns: None       Rachel Grimes LPN            "

## 2021-10-13 NOTE — PATIENT INSTRUCTIONS
1. You received botox injections today at your visit.  2. As discussed, you will return to clinic in 3 months for repeat injections.

## 2021-10-14 ENCOUNTER — HOME INFUSION (PRE-WILLOW HOME INFUSION) (OUTPATIENT)
Dept: PHARMACY | Facility: CLINIC | Age: 21
End: 2021-10-14

## 2021-10-14 ENCOUNTER — NURSE TRIAGE (OUTPATIENT)
Dept: ONCOLOGY | Facility: CLINIC | Age: 21
End: 2021-10-14

## 2021-10-14 NOTE — TELEPHONE ENCOUNTER
[facility] Triage    Incoming call: Sickle Cell Pain    Pt called in to triage requesting IVF pain meds for lower back and left side hip pain rated 8/10 x 3 hours. Stated last took Oxycodone and oxycontin at 6 this morning without relief. Denied any fevers, chills, cough, sob, chest pain or other symptoms. Pt's last infusion was 10/13/21, last clinic visit Dr. Pierce 10/13/21 and Dr. Duncan 9/20/21 with follow-up on 10/18/21 with Perla. Patient states they does not have own ride and it will take 20  long to get to cancer clinic. Pt denied being out of home medications and needing any refills today. Pt qualifies for sickle cell standing order protocol.    Per Dr. Duncan 10/13/21 - Jennifer can come in for 3 consecutive IVF and Pain meds.  No need to page Dr. Duncan until after the 3rd visit.  She had one 10/12, 10/13 and is requesting today, 10/14.    Routing to Dr. Duncan and Amanda Roth

## 2021-10-15 ENCOUNTER — TELEPHONE (OUTPATIENT)
Dept: ONCOLOGY | Facility: CLINIC | Age: 21
End: 2021-10-15

## 2021-10-15 ENCOUNTER — INFUSION THERAPY VISIT (OUTPATIENT)
Dept: ONCOLOGY | Facility: CLINIC | Age: 21
End: 2021-10-15
Attending: PEDIATRICS
Payer: COMMERCIAL

## 2021-10-15 ENCOUNTER — PATIENT OUTREACH (OUTPATIENT)
Dept: CARE COORDINATION | Facility: CLINIC | Age: 21
End: 2021-10-15

## 2021-10-15 VITALS
HEART RATE: 109 BPM | TEMPERATURE: 98.3 F | DIASTOLIC BLOOD PRESSURE: 87 MMHG | OXYGEN SATURATION: 91 % | SYSTOLIC BLOOD PRESSURE: 134 MMHG | RESPIRATION RATE: 18 BRPM

## 2021-10-15 DIAGNOSIS — D57.00 SICKLE CELL PAIN CRISIS (H): ICD-10-CM

## 2021-10-15 DIAGNOSIS — G81.10 SPASTIC HEMIPLEGIA, UNSPECIFIED ETIOLOGY, UNSPECIFIED LATERALITY (H): Primary | ICD-10-CM

## 2021-10-15 PROCEDURE — 250N000011 HC RX IP 250 OP 636: Performed by: PEDIATRICS

## 2021-10-15 PROCEDURE — 258N000003 HC RX IP 258 OP 636: Performed by: PEDIATRICS

## 2021-10-15 PROCEDURE — 96374 THER/PROPH/DIAG INJ IV PUSH: CPT

## 2021-10-15 PROCEDURE — 96361 HYDRATE IV INFUSION ADD-ON: CPT

## 2021-10-15 PROCEDURE — 96376 TX/PRO/DX INJ SAME DRUG ADON: CPT

## 2021-10-15 RX ORDER — HEPARIN SODIUM,PORCINE 10 UNIT/ML
5 VIAL (ML) INTRAVENOUS
Status: CANCELLED | OUTPATIENT
Start: 2022-01-01

## 2021-10-15 RX ORDER — DIPHENHYDRAMINE HCL 25 MG
25 CAPSULE ORAL
Status: CANCELLED
Start: 2022-01-01

## 2021-10-15 RX ORDER — HEPARIN SODIUM (PORCINE) LOCK FLUSH IV SOLN 100 UNIT/ML 100 UNIT/ML
5 SOLUTION INTRAVENOUS
Status: DISCONTINUED | OUTPATIENT
Start: 2021-10-15 | End: 2021-10-15 | Stop reason: HOSPADM

## 2021-10-15 RX ORDER — MORPHINE SULFATE 2 MG/ML
2 INJECTION, SOLUTION INTRAMUSCULAR; INTRAVENOUS
Status: COMPLETED | OUTPATIENT
Start: 2021-10-15 | End: 2021-10-15

## 2021-10-15 RX ORDER — HEPARIN SODIUM (PORCINE) LOCK FLUSH IV SOLN 100 UNIT/ML 100 UNIT/ML
5 SOLUTION INTRAVENOUS
Status: CANCELLED | OUTPATIENT
Start: 2022-01-01

## 2021-10-15 RX ORDER — MORPHINE SULFATE 2 MG/ML
2 INJECTION, SOLUTION INTRAMUSCULAR; INTRAVENOUS
Status: CANCELLED
Start: 2022-01-01

## 2021-10-15 RX ORDER — ONDANSETRON 8 MG/1
8 TABLET, FILM COATED ORAL
Status: CANCELLED
Start: 2022-01-01

## 2021-10-15 RX ADMIN — MORPHINE SULFATE 2 MG: 2 INJECTION, SOLUTION INTRAMUSCULAR; INTRAVENOUS at 12:28

## 2021-10-15 RX ADMIN — MORPHINE SULFATE 2 MG: 2 INJECTION, SOLUTION INTRAMUSCULAR; INTRAVENOUS at 11:30

## 2021-10-15 RX ADMIN — MORPHINE SULFATE 2 MG: 2 INJECTION, SOLUTION INTRAMUSCULAR; INTRAVENOUS at 10:31

## 2021-10-15 RX ADMIN — Medication 5 ML: at 12:48

## 2021-10-15 RX ADMIN — DEXTROSE AND SODIUM CHLORIDE 500 ML: 5; 450 INJECTION, SOLUTION INTRAVENOUS at 10:28

## 2021-10-15 ASSESSMENT — PAIN SCALES - GENERAL: PAINLEVEL: EXTREME PAIN (9)

## 2021-10-15 NOTE — PROGRESS NOTES
Social Work Follow-Up  [facility]    Data/Intervention:  Patient Name:  Jennifer Cervantes  /Age:  2000 (21 year old)    Reason for Follow-Up:  Transportation    Collaborated With:    -Triage  -[employer]  -Patient    Intervention/Education/Resources Provided:  SW received phone call from Triage asking if SW received page about transportation for patient's appointment at 10 am. SW did not receive page and set up ride with [employer] using taxi voucher. SW called patient informed them about their ride details and encouraged patient to call SW if there are any issues with their ride. Patient verbalized understanding.     Assessment/Plan:  SW will remain available as needed.  Previously provided patient/family with writer's contact information and availability.       CARLOS Chavez,LGSW  Hematology/Oncology Social Worker  Phone:[phone] Pager: [phone]

## 2021-10-15 NOTE — PROGRESS NOTES
This is a recent snapshot of the patient's [facility] medical record.  For current drug dose and complete information and questions, call [phone] or In Basket pool, [facility] home infusion ([id number])  CSN Number:  [id number]

## 2021-10-15 NOTE — PROGRESS NOTES
Infusion Nursing Note:  Jennifer Cervantes presents today for IVF, pain medications.    Patient seen by provider : No    present during visit today: Not Applicable.    Note: Jennifer arrives to infusion today with pain in low back and hips rated 9/10. She feels this is her typical sickle cell pain and attributes it to the change in weather. She last took home oral pain medication at 0600 without relief. She denies any chest pain, SOB, nausea, or other new symptoms today.     Intravenous Access:  Implanted Port.    Treatment Conditions:  Not Applicable.    Post Infusion Assessment:  Patient tolerated infusion without incident.  Blood return noted pre and post infusion.  Access discontinued per protocol.  Rates pain 5/10 after 3 doses of morphine.   Patient felt comfortable discharging home.     Discharge Plan:   Patient declined prescription refills.  AVS to patient via [employer].  Patient will return 10/18 for labs and Dr. Duncan appointment.   Patient discharged in stable condition accompanied by: self.  Departure Mode: Ambulatory.    Amanda Blue RN

## 2021-10-15 NOTE — CONFIDENTIAL NOTE
Pt called in to triage requesting IVF pain meds for lower back and hip pain rated 8/10 x 2 days. Stated last took prn 6 am oxycodone , and oxycontin this morning without relief. Denied any fevers, chills, cough, sob, chest pain or other symptoms. Pt's last infusion was 10/13/21, last clinic visit 9/20 with Dr Duncan with follow-up on 10/18/21 with Dr Duncan. Patient states they need assistance with transportation and it will take 60 minutes long to get to cancer clinic.  Pt denied being out of home medications and needing any refills today.     7:11 paged Dr Duncan   7:51 received OK that Jennifer can come in for IVF/Pain.     Call placed to Jennifer Higuera for 10:00 Am IVF/Pain.     Page sent to  Abdullahi Elliott to assist with ride.     Message sent to  to schedule, for IVF/Pain on 10/15 at 10:00 am, No labs.

## 2021-10-18 ENCOUNTER — APPOINTMENT (OUTPATIENT)
Dept: LAB | Facility: CLINIC | Age: 21
End: 2021-10-18
Attending: PEDIATRICS
Payer: COMMERCIAL

## 2021-10-18 ENCOUNTER — VIRTUAL VISIT (OUTPATIENT)
Dept: ONCOLOGY | Facility: CLINIC | Age: 21
End: 2021-10-18
Attending: PEDIATRICS
Payer: COMMERCIAL

## 2021-10-18 ENCOUNTER — NURSE TRIAGE (OUTPATIENT)
Dept: ONCOLOGY | Facility: CLINIC | Age: 21
End: 2021-10-18

## 2021-10-18 ENCOUNTER — TELEPHONE (OUTPATIENT)
Dept: ONCOLOGY | Facility: CLINIC | Age: 21
End: 2021-10-18

## 2021-10-18 DIAGNOSIS — E83.111 IRON OVERLOAD DUE TO REPEATED RED BLOOD CELL TRANSFUSIONS: ICD-10-CM

## 2021-10-18 DIAGNOSIS — D57.00 SICKLE CELL PAIN CRISIS (H): Primary | ICD-10-CM

## 2021-10-18 DIAGNOSIS — D57.00 SICKLE CELL DISEASE WITH CRISIS (H): Primary | ICD-10-CM

## 2021-10-18 DIAGNOSIS — D57.00 SICKLE CELL PAIN CRISIS (H): ICD-10-CM

## 2021-10-18 DIAGNOSIS — G81.10 SPASTIC HEMIPLEGIA, UNSPECIFIED ETIOLOGY, UNSPECIFIED LATERALITY (H): ICD-10-CM

## 2021-10-18 LAB
FERRITIN SERPL-MCNC: 8023 NG/ML (ref 12–150)
RETICS # AUTO: 0.57 10E6/UL (ref 0.03–0.1)
RETICS/RBC NFR AUTO: 22.5 % (ref 0.5–2)

## 2021-10-18 PROCEDURE — 85045 AUTOMATED RETICULOCYTE COUNT: CPT

## 2021-10-18 PROCEDURE — 36591 DRAW BLOOD OFF VENOUS DEVICE: CPT

## 2021-10-18 PROCEDURE — 250N000011 HC RX IP 250 OP 636: Performed by: PEDIATRICS

## 2021-10-18 PROCEDURE — 82728 ASSAY OF FERRITIN: CPT

## 2021-10-18 PROCEDURE — 99213 OFFICE O/P EST LOW 20 MIN: CPT | Mod: TEL | Performed by: PEDIATRICS

## 2021-10-18 PROCEDURE — 96374 THER/PROPH/DIAG INJ IV PUSH: CPT

## 2021-10-18 PROCEDURE — 96361 HYDRATE IV INFUSION ADD-ON: CPT

## 2021-10-18 PROCEDURE — 999N000102 HC STATISTIC NO CHARGE CLINIC VISIT

## 2021-10-18 PROCEDURE — 96375 TX/PRO/DX INJ NEW DRUG ADDON: CPT

## 2021-10-18 PROCEDURE — 258N000003 HC RX IP 258 OP 636: Performed by: PEDIATRICS

## 2021-10-18 RX ORDER — DIPHENHYDRAMINE HCL 25 MG
25 CAPSULE ORAL
Status: CANCELLED
Start: 2022-01-01

## 2021-10-18 RX ORDER — HEPARIN SODIUM (PORCINE) LOCK FLUSH IV SOLN 100 UNIT/ML 100 UNIT/ML
5 SOLUTION INTRAVENOUS EVERY 8 HOURS PRN
Status: DISCONTINUED | OUTPATIENT
Start: 2021-10-18 | End: 2021-10-18 | Stop reason: HOSPADM

## 2021-10-18 RX ORDER — MORPHINE SULFATE 2 MG/ML
2 INJECTION, SOLUTION INTRAMUSCULAR; INTRAVENOUS
Status: CANCELLED
Start: 2022-01-01

## 2021-10-18 RX ORDER — MORPHINE SULFATE 15 MG/1
15 TABLET, FILM COATED, EXTENDED RELEASE ORAL EVERY 12 HOURS
Qty: 60 TABLET | Refills: 0 | Status: SHIPPED | OUTPATIENT
Start: 2021-10-18 | End: 2021-11-22

## 2021-10-18 RX ORDER — HEPARIN SODIUM (PORCINE) LOCK FLUSH IV SOLN 100 UNIT/ML 100 UNIT/ML
5 SOLUTION INTRAVENOUS
Status: DISCONTINUED | OUTPATIENT
Start: 2021-10-18 | End: 2021-10-18 | Stop reason: HOSPADM

## 2021-10-18 RX ORDER — MORPHINE SULFATE 2 MG/ML
2 INJECTION, SOLUTION INTRAMUSCULAR; INTRAVENOUS
Status: COMPLETED | OUTPATIENT
Start: 2021-10-18 | End: 2021-10-18

## 2021-10-18 RX ORDER — HEPARIN SODIUM,PORCINE 10 UNIT/ML
5 VIAL (ML) INTRAVENOUS
Status: CANCELLED | OUTPATIENT
Start: 2022-01-01

## 2021-10-18 RX ORDER — HEPARIN SODIUM (PORCINE) LOCK FLUSH IV SOLN 100 UNIT/ML 100 UNIT/ML
5 SOLUTION INTRAVENOUS
Status: CANCELLED | OUTPATIENT
Start: 2022-01-01

## 2021-10-18 RX ORDER — ONDANSETRON 8 MG/1
8 TABLET, FILM COATED ORAL
Status: CANCELLED
Start: 2022-01-01

## 2021-10-18 RX ADMIN — MORPHINE SULFATE 2 MG: 2 INJECTION, SOLUTION INTRAMUSCULAR; INTRAVENOUS at 12:21

## 2021-10-18 RX ADMIN — Medication 5 ML: at 11:55

## 2021-10-18 RX ADMIN — MORPHINE SULFATE 2 MG: 2 INJECTION, SOLUTION INTRAMUSCULAR; INTRAVENOUS at 14:22

## 2021-10-18 RX ADMIN — DEXTROSE AND SODIUM CHLORIDE 500 ML: 5; 450 INJECTION, SOLUTION INTRAVENOUS at 12:20

## 2021-10-18 RX ADMIN — Medication 5 ML: at 14:39

## 2021-10-18 RX ADMIN — MORPHINE SULFATE 2 MG: 2 INJECTION, SOLUTION INTRAMUSCULAR; INTRAVENOUS at 13:23

## 2021-10-18 NOTE — PATIENT INSTRUCTIONS
Contact Numbers  [facility]: [phone]    After Hours:  [phone]  Triage: [phone]    Please call the [facility] Triage line if you experience a temperature greater than or equal to 100.5, shaking chills, have uncontrolled nausea, vomiting and/or diarrhea, dizziness, shortness of breath, chest pain, bleeding, unexplained bruising, or if you have any other new/concerning symptoms, questions or concerns.     If it is after hours, weekends, or holidays, please call the main hospital  at  [phone] and ask to speak to the Oncology doctor on call.     If you are having any concerning symptoms or wish to speak to a provider before your next infusion visit, please call your care coordinator or triage to notify them so we can adequately serve you.     If you need a refill on a narcotic prescription or other medication, please call triage before your infusion appointment.         October 2021 Sunday Monday Tuesday Wednesday Thursday Friday Saturday                            1    ONC INFUSION 2 HR (120 MIN)   1:00 PM   (120 min.)    ONC INFUSION NURSE   [facility] 2       3     4    ONC INFUSION 2 HR (120 MIN)  11:00 AM   (120 min.)    ONC INFUSION NURSE   [facility] 5     6    ONC INFUSION 2 HR (120 MIN)   9:00 AM   (120 min.)    ONC INFUSION NURSE   [facility] 7    BMT INFUSION 2 HR (120 MIN)   1:00 PM   (120 min.)    BMT INFUSION NURSE   [facility] [place] 8    INFUSION 2.5 HR (150 MIN)   9:30 AM   (150 min.)   [facility] 04   [facility] Infusion Services [facility] 9       10     11    ONC INFUSION 2 HR (120 MIN)   8:30 AM   (120 min.)   [facility] ONC INFUSION NURSE   [facility] 12    ONC INFUSION 2 HR (120 MIN)  11:00 AM   (120 min.)    ONC INFUSION NURSE   [facility] 13    RETURN  11:15 AM   (60  min.)   Katherine Pierce MD   [facility]    ONC INFUSION 2 HR (120 MIN)   1:00 PM   (120 min.)    ONC INFUSION NURSE   [facility] 14     15    ONC INFUSION 2 HR (120 MIN)  10:00 AM   (120 min.)    ONC INFUSION NURSE   [facility] 16       17     18    LAB CENTRAL  11:15 AM   (15 min.)   [facility] DRAW   [facility]    ONC INFUSION 2 HR (120 MIN)  12:00 PM   (120 min.)    ONC INFUSION NURSE   [facility]    TELEPHONE VISIT RETURN  12:00 PM   (30 min.)   Eric Duncan MD   [facility] 19    VIDEO VISIT NEW   8:45 AM   (60 min.)   Jonas Cervantes MD   [facility] 20    ONC INFUSION 3 HR (180 MIN)   9:00 AM   (180 min.)    ONC INFUSION NURSE   [facility] 21     22     23       24     25     26     27     28     29     30       31 November 2021 Sunday Monday Tuesday Wednesday Thursday Friday Saturday        1     2     3     4     5     6       7     8     9     10     11     12     13       14     15     16     17     18     19     20       21     22     23     24    [facility] NURSE VISIT   9:00 AM   (30 min.)   Nurse, [facility] Internal Medicine [place] 25     26     27       28     29     30                                         Lab Results:  Recent Results (from the past 12 hour(s))   Reticulocyte count    Collection Time: 10/18/21 12:03 PM   Result Value Ref Range    % Reticulocyte 22.5 (H) 0.5 - 2.0 %    Absolute Reticulocyte 0.574 (H) 0.025 - 0.095 10e6/uL   Ferritin    Collection Time: 10/18/21 12:03 PM   Result Value Ref Range    Ferritin 8,023 (H) 12 - 150 ng/mL

## 2021-10-18 NOTE — TELEPHONE ENCOUNTER
[facility] Triage    Needs Prior Authorization to get her pain medicine.    Called [facility]:   Pharmacy is working on the prior auth.  Advised Jennifer to call before she comes to pick it up.

## 2021-10-18 NOTE — LETTER
10/18/2021         RE: Jennifer Cervantes  [address]  [place] [address]             Sickle Cell Outpatient Follow Up Visit    Jennifer is a 21 year old who is being evaluated via a billable telephone visit.      What phone number would you like to be contacted at?[phone]  How would you like to obtain your AVS? Mail a copy  Phone call duration: 24 minutes           Date of encounter: Oct 18, 2021    Jennifer Cervantes is a 21 year old female who is being evaluated for regular care and ED follow ups related to SCD      Interval History: Since our last visit, Jennifer has stayed out of the ED though she has been to the infusion center most days. She denies any new major stresses though she did say her family is looking to move back to [place]. No issues with medications. She has been using the oxycodone every 4-6 hours as prescribed. She is interested in trying MS Contin instead of Oxycodone. She is ready to get back on board with the IV chelation.     She has been doing well with the dabigatran and aspirin.     No fevers or cough. No signs of infection.      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 She also has significant anxiety and depression with a strong anxiety about transferring to the adult clinic. She usually has pain crises secondary to the anxiety.       ---------------------------------------  Jennifer's Goals (discussed 10/19/2021 )    1-3 month goal:  Move into her own living space    6 month goal:  Enroll in college classes    12 month goal:  TBD    Disease-specific goal(s):  Reduce ED trips and utilizing success in other non-medical endeavors to help with  pain management  ---------------------------------------    --------------------------------  Plan last reviewed with patient: 9/20/2021    Patient background: 22yo F, enjoys movies and kids though there are times where she does not really want to talk to people. Does not have a lot of social support at home.     Sickle Cell Disease History  Primary Hematologist: Perla  PCP: Raul  Genotype: SS  Acute Pain Crisis Treatment: (avoiding IV opioids for now, which she has agreed to)    ER   o Oxycodone 15-20 mg x1  o Toradol 15 mg IV x1   o Maintenance IV fluids with LR  o Other: Zofran 8 mg IV PRN nausea    Inpatient:  o Home oxycodone/Oxycontin regimen, though home oxycodone dosing could be increased to 20 mg to start  o PCA plan:   - None for now  o Other Medications: Zofran  o She has had success with ketamine starting at 4mg/h and advancing only to 6mg/h, as 8mg/h made her feel quite poorly.  o ASA/Dabigatran   o Supportive Care: Docusate, Senna  Chronic Pain Medications:    Home regimen Oxycontin 10 mg q12h, oxycodone 15 mg p.o. q.4-6 hours p.r.n. breakthrough pain.  She also continues on Voltaren gel, and Zoloft among other medications.    -Also benefits from mental health visits, acupuncture  Baseline Hemoglobin: 7 g/dl without chronic transfusions  Hydroxyurea use: Yes  H/O blood transfusions: Yes, several (iron overload) Most recent 7/13/2021    H/O Transfusion Reactions: no    Antibodies:none  H/O Acute Chest Syndrome: Yes    Last episode: 10/2019     ICU/intubation: unsure  H/O Stroke: Yes (managed with chronic transfusions in the past, stopped late Spring 2020)  H/O VTE: Yes (2/2021)  H/O Cholecystectomy or Splenectomy: no  H/O Asthma, Pulm HTN, AVN, Leg Ulcers, Nephropathy, Retinopathy, etc: Iron overload, asthma, chronic lung disease, physical limitations from early stroke     -------------------------------------------    Sickle Cell Disease Comprehensive Checklist    Bone [facility]/Avascular Necrosis  Screening/Symptoms (each visit): no new concerns today    Leg Ulcer evaluation (every visit): none    Hypertension (every visit): borderline hypertensive recently but improved later in evening and previous day on most of ED visits    Last pulmonary evaluation (asthma, AMAN, pulm HTN): within last year( due again)    Stroke/silent cerebral infarct Hx (Y/N): Yes TIA ~2014, first event ~age 1 with full stroke and R sided weakness    Last PCP Visit: 8/2020    Vaccines:  o PCV13: 5/13/19  o Pneumovax (PPSV23): 3/04, 10/09, 7/12/19 (next due 7/2024)  o Menactra: 4/2010, 9/2015 (MCV done 8/16)  o Influenza: got 20-21 vaccine per self report    Audiology (chelation): done 6/2020, normal (due again)    Past Medical History:  Past Medical History:   Diagnosis Date     Anxiety      Bleeding disorder (H)      Blood clotting disorder (H)      Cerebral infarction (H) 2015     Cognitive developmental delay     low IQ. 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 (H)     right hand contractures     Iron overload due to repeated red blood cell transfusions      Migraines      Multiple subsegmental pulmonary emboli without acute cor pulmonale (H) 02/01/2021     Oppositional defiant behavior      Superficial venous thrombosis of arm, right 03/25/2021     Uncomplicated asthma        Past Surgical History:  Past Surgical History:   Procedure Laterality Date     AS INSERT TUNNELED CV 2 CATH W/O PORT/PUMP       C BREAST REDUCTION (INCLUDES LIPO) TIER 3 Bilateral 04/23/2019     CHOLECYSTECTOMY       INSERT PORT VASCULAR ACCESS Left 4/21/2021    Procedure: INSERTION, VASCULAR ACCESS PORT (NOT SURE ON SIDE UNTIL REMOVAL);  Surgeon: Rajan More MD;  Location: [facility] OR     IR CHEST PORT PLACEMENT > 5 YRS OF AGE  4/21/2021     IR CVC NON TUNNEL LINE REMOVAL  5/6/2021     IR CVC NON TUNNEL PLACEMENT  04/07/2020     IR  "CVC NON TUNNEL PLACEMENT  4/30/2021     IR PORT REMOVAL LEFT  4/21/2021     REMOVE PORT VASCULAR ACCESS Left 4/21/2021    Procedure: REMOVAL, VASCULAR ACCESS PORT LEFT;  Surgeon: Rajan More MD;  Location: [facility] OR     REPAIR TENDON ELBOW Right 10/02/2019    Procedure: Right Elbow Flexor Lengthening, Flexor Pronator Slide Of Wrist and Finger, Thumb Adductor Lengthening;  Surgeon: Anai Franco MD;  [facility]     TONSILLECTOMY Bilateral 10/02/2019    Procedure: Bilateral Tonsillectomy;  Surgeon: Farhana Guy MD;  [facility]: [facility] OR     Social History:  Social History     Socioeconomic History     Marital status: Single     Spouse name: Not on file     Number of children: Not on file     Years of education: Not on file     Highest education level: Not on file   Occupational History     Not on file   Tobacco Use     Smoking status: Never Smoker     Smokeless tobacco: Never Used   Substance and Sexual Activity     Alcohol use: Not Currently     Alcohol/week: 0.0 standard drinks     Drug use: Never     Sexual activity: Not Currently     Partners: Male     Birth control/protection: Other   Other Topics Concern     Parent/sibling w/ CABG, MI or angioplasty before 65F 55M? Not Asked   Social History Narrative    Lives with mom and extended family but \"toxic environment\" per her report. She would like to move out but cannot financially do so. She has minimal support at home despite her significant SCD comorbidities and cognitive delay from stroke.     Social Determinants of Health     Financial Resource Strain:      Difficulty of Paying Living Expenses:    Food Insecurity:      Worried About Running Out of Food in the Last Year:      Ran Out of Food in the Last Year:    Transportation Needs:      Lack of Transportation (Medical):      Lack of Transportation (Non-Medical):    Physical Activity:      Days of Exercise per Week:      Minutes of Exercise per Session:    Stress:      Feeling of Stress " :    Social Connections:      Frequency of Communication with Friends and Family:      Frequency of Social Gatherings with Friends and Family:      Attends Christian Services:      Active Member of Clubs or Organizations:      Attends Club or Organization Meetings:      Marital Status:    Intimate Partner Violence: Not At Risk     Fear of Current or Ex-Partner: No     Emotionally Abused: No     Physically Abused: No     Sexually Abused: No       Medications:  Current Outpatient Medications   Medication     acetaminophen (TYLENOL) 325 MG tablet     albuterol (PROAIR HFA/PROVENTIL HFA/VENTOLIN HFA) 108 (90 Base) MCG/ACT inhaler     albuterol (PROVENTIL) (2.5 MG/3ML) 0.083% neb solution     ARIPiprazole (ABILIFY) 2 MG tablet     aspirin (ASA) 81 MG chewable tablet     budesonide-formoterol (SYMBICORT) 160-4.5 MCG/ACT Inhaler     dabigatran ANTICOAGULANT (PRADAXA) 150 MG capsule     diclofenac (VOLTAREN) 1 % topical gel     diphenhydrAMINE (BENADRYL) 25 MG capsule     DULoxetine (CYMBALTA) 30 MG capsule     EPINEPHrine (ANY BX GENERIC EQUIV) 0.3 MG/0.3ML injection 2-pack     Hydroxyurea 1000 MG TABS     hydrOXYzine (ATARAX) 25 MG tablet     JADENU 360 MG tablet     lidocaine-prilocaine (EMLA) 2.5-2.5 % external cream     medroxyPROGESTERone (DEPO-PROVERA) 150 MG/ML IM injection     morphine (MS CONTIN) 15 MG CR tablet     naloxone (NARCAN) 4 MG/0.1ML nasal spray     omeprazole (PRILOSEC) 20 MG DR capsule     ondansetron (ZOFRAN) 8 MG tablet     oxyCODONE IR (ROXICODONE) 15 MG tablet     Current Facility-Administered Medications   Medication     medroxyPROGESTERone (DEPO-PROVERA) syringe 150 mg         Physical Exam:   There were no vitals taken for this visit.     Phone visit though she was in the infusion center today      Labs:   Results for HIMANSHU LA (MRN [id number]) as of 10/19/2021 21:55   Ref. Range 10/18/2021 12:03   Ferritin Latest Ref Range: 12 - 150 ng/mL 8,023 (H)   % Retic Latest Ref Range: 0.5 - 2.0 %  22.5 (H)   Absolute Retic Latest Ref Range: 0.025 - 0.095 10e6/uL 0.574 (H)     Imaging:   Ferriscan 8/18/21  History of sickle cell disease with chronic transfusions and extreme iron overload, currently on medication. Hemachromatosis due to  repeated. Blood cell transfusions.     COMPARISON: 3/25/2021     TECHNIQUE:     Sequential non-contrast spin-echo MRI imaging obtained of the liver with TR 2500 msec and progressively longer Miley up to 18 msec.     FINDINGS:     Average liver iron concentration is 32.5 mg/g dry tissue (normal = 0.17 - 1.8) previously 43  Average liver iron concentration is 581 mmol/kg dry tissue (normal = 3 - 33)     Other findings: Markedly increased iron deposition in the spleen on subjective evaluation.                                                                      IMPRESSION: Increased iron concentration in the liver and spleen.     I have personally reviewed the examination and initial interpretation and I agree with the findings.     DAYNA PAUL MD   ----------    Assessment:  Jennifer Cervantes is a 21 year old female with HbSS. Her disease has been complicated by stroke leading to some slight cognitive delay and right sided hemiparesis, high anxiety leading to frequent pain crises on top of chronic pain syndrome, poor social structure at home with no real support, and iron overload secondary to repeated transfusions.    She is stable overall and doing well with meds. She has chronic pain but we are able to keep her out of the ED by foregoing IV infusions there. She is going to try to hold off on in-clinic infusions over the next week too. We will try some opioid rotation and switch her long-acting to MS Contin. Ferriscan also ordered and we will get back to doing every other weekend chelation.       Major Topics of the Visit:  1. Pain Management: Updated pain plan. Only PO opioids in ED    -Crizanlizumab monthly (started June 2021)   2. Iron overload/Pharmacy Issues: Continue  Jadenu and Desferal. Needs audiology follow up as well, last done June 2020.  3. High RBC turnover: Jennifer really has a high degree of RBC turnover, more than most patients I have seen. Oxybryta led to more frequent pain episodes (chest pain, which was new) and did not change the RBC turnover so it was stopped in December. No change currently  4. Pulmonary Emboli + new RUE DVT (innominate vein) of unclear chronicity + new symptomatic R cephalic SVT this week: Now on dabigatran after having DVT/PE while on apixaban and rivaroxaban. Her levels have been therapeutic so it is odd that her metabolism of drugs is less predictable. We did start her back on ASA. She may have some pulmonary HTN with her recurrent chest discomfort and known PEs. Back on ASA  5. Stroke sequelae on right side: Follow up with  PM&R as needed  Follow up: Virtual with Andrei Machado in 2 weeks    Review of inpatient care as documented above   Review of the result(s) of each unique test - labs as above  Diagnosis or treatment significantly limited by social determinants of health - sickle cell disease, racial inequity, difficult social situation at home  Ordering of specific tests      Eric Duncan MD  Hematologist  Division of Hematology, Oncology, and Transplantation  [facility]  [facility]  Pager: [phone]                Eric Duncan MD

## 2021-10-18 NOTE — TELEPHONE ENCOUNTER
[facility] Triage    Incoming call at 701 am    Pt called in to triage requesting IVF pain meds for all over pain rated 8/10 x 3 days. Stated last took oxycontin, oxycodone, tyl and muscle relaxer at 6 am this morning without relief. Denied any fevers, chills, cough, sob, chest pain or other symptoms. Pt's last infusion was 10/15, last clinic visit 9/20/21 with Perla with follow-up on 10/18 with Miguelina. Patient states they do not have own ride and it will take 20 long to get to cancer clinic. Pt denied being out of home medications and needing any refills today. Pt qualifies for sickle cell standing order protocol.    Per Dr. Duncan - Jennifer can come in for 3 consecutive visits without paging him.  She was in  10/11, 10/12,10/13, and 10/15     Has labs at 1115 and sees Dr. Duncan at noon.    Routed to Dr. Duncan and Amanda Roth

## 2021-10-18 NOTE — PROGRESS NOTES
Sickle Cell Outpatient Follow Up Visit    Jennifer is a 21 year old who is being evaluated via a billable telephone visit.      What phone number would you like to be contacted at?[phone]  How would you like to obtain your AVS? Mail a copy  Phone call duration: 24 minutes           Date of encounter: Oct 18, 2021    Jennifer Cervantes is a 21 year old female who is being evaluated for regular care and ED follow ups related to SCD      Interval History: Since our last visit, Jennifer has stayed out of the ED though she has been to the infusion center most days. She denies any new major stresses though she did say her family is looking to move back to [place]. No issues with medications. She has been using the oxycodone every 4-6 hours as prescribed. She is interested in trying MS Contin instead of Oxycodone. She is ready to get back on board with the IV chelation.     She has been doing well with the dabigatran and aspirin.     No fevers or cough. No signs of infection.      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 She also has significant anxiety and depression with a strong anxiety about transferring to the adult clinic. She usually has pain crises secondary to the anxiety.       ---------------------------------------  Jennifer's Goals (discussed 10/19/2021 )    1-3 month goal:  Move into her own living space    6 month goal:  Enroll in college classes    12 month goal:  TBD    Disease-specific goal(s):  Reduce ED trips and utilizing success in other non-medical endeavors to help with pain  management  ---------------------------------------    --------------------------------  Plan last reviewed with patient: 9/20/2021    Patient background: 22yo F, enjoys movies and kids though there are times where she does not really want to talk to people. Does not have a lot of social support at home.     Sickle Cell Disease History  Primary Hematologist: Perla  PCP: Raul  Genotype: SS  Acute Pain Crisis Treatment: (avoiding IV opioids for now, which she has agreed to)    ER   o Oxycodone 15-20 mg x1  o Toradol 15 mg IV x1   o Maintenance IV fluids with LR  o Other: Zofran 8 mg IV PRN nausea    Inpatient:  o Home oxycodone/Oxycontin regimen, though home oxycodone dosing could be increased to 20 mg to start  o PCA plan:   - None for now  o Other Medications: Zofran  o She has had success with ketamine starting at 4mg/h and advancing only to 6mg/h, as 8mg/h made her feel quite poorly.  o ASA/Dabigatran   o Supportive Care: Docusate, Senna  Chronic Pain Medications:    Home regimen Oxycontin 10 mg q12h, oxycodone 15 mg p.o. q.4-6 hours p.r.n. breakthrough pain.  She also continues on Voltaren gel, and Zoloft among other medications.    -Also benefits from mental health visits, acupuncture  Baseline Hemoglobin: 7 g/dl without chronic transfusions  Hydroxyurea use: Yes  H/O blood transfusions: Yes, several (iron overload) Most recent 7/13/2021    H/O Transfusion Reactions: no    Antibodies:none  H/O Acute Chest Syndrome: Yes    Last episode: 10/2019     ICU/intubation: unsure  H/O Stroke: Yes (managed with chronic transfusions in the past, stopped late Spring 2020)  H/O VTE: Yes (2/2021)  H/O Cholecystectomy or Splenectomy: no  H/O Asthma, Pulm HTN, AVN, Leg Ulcers, Nephropathy, Retinopathy, etc: Iron overload, asthma, chronic lung disease, physical limitations from early stroke     -------------------------------------------    Sickle Cell Disease Comprehensive Checklist    Bone Health/Avascular Necrosis  Screening/Symptoms (each visit): no new concerns today    Leg Ulcer evaluation (every visit): none    Hypertension (every visit): borderline hypertensive recently but improved later in evening and previous day on most of ED visits    Last pulmonary evaluation (asthma, AMAN, pulm HTN): within last year( due again)    Stroke/silent cerebral infarct Hx (Y/N): Yes TIA ~2014, first event ~age 1 with full stroke and R sided weakness    Last PCP Visit: 8/2020    Vaccines:  o PCV13: 5/13/19  o Pneumovax (PPSV23): 3/04, 10/09, 7/12/19 (next due 7/2024)  o Menactra: 4/2010, 9/2015 (MCV done 8/16)  o Influenza: got 20-21 vaccine per self report    Audiology (chelation): done 6/2020, normal (due again)    Past Medical History:  Past Medical History:   Diagnosis Date     Anxiety      Bleeding disorder (H)      Blood clotting disorder (H)      Cerebral infarction (H) 2015     Cognitive developmental delay     low IQ. 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 (H)     right hand contractures     Iron overload due to repeated red blood cell transfusions      Migraines      Multiple subsegmental pulmonary emboli without acute cor pulmonale (H) 02/01/2021     Oppositional defiant behavior      Superficial venous thrombosis of arm, right 03/25/2021     Uncomplicated asthma        Past Surgical History:  Past Surgical History:   Procedure Laterality Date     AS INSERT TUNNELED CV 2 CATH W/O PORT/PUMP       C BREAST REDUCTION (INCLUDES LIPO) TIER 3 Bilateral 04/23/2019     CHOLECYSTECTOMY       INSERT PORT VASCULAR ACCESS Left 4/21/2021    Procedure: INSERTION, VASCULAR ACCESS PORT (NOT SURE ON SIDE UNTIL REMOVAL);  Surgeon: Rajan More MD;  Location: [facility] OR     IR CHEST PORT PLACEMENT > 5 YRS OF AGE  4/21/2021     IR CVC NON TUNNEL LINE REMOVAL  5/6/2021     IR CVC NON TUNNEL PLACEMENT  04/07/2020     IR  "CVC NON TUNNEL PLACEMENT  4/30/2021     IR PORT REMOVAL LEFT  4/21/2021     REMOVE PORT VASCULAR ACCESS Left 4/21/2021    Procedure: REMOVAL, VASCULAR ACCESS PORT LEFT;  Surgeon: Rajan More MD;  Location: [facility] OR     REPAIR TENDON ELBOW Right 10/02/2019    Procedure: Right Elbow Flexor Lengthening, Flexor Pronator Slide Of Wrist and Finger, Thumb Adductor Lengthening;  Surgeon: Anai Franco MD;  Location: [facility] OR     TONSILLECTOMY Bilateral 10/02/2019    Procedure: Bilateral Tonsillectomy;  Surgeon: Farhana Guy MD;  Location: [facility] OR     Social History:  Social History     Socioeconomic History     Marital status: Single     Spouse name: Not on file     Number of children: Not on file     Years of education: Not on file     Highest education level: Not on file   Occupational History     Not on file   Tobacco Use     Smoking status: Never Smoker     Smokeless tobacco: Never Used   Substance and Sexual Activity     Alcohol use: Not Currently     Alcohol/week: 0.0 standard drinks     Drug use: Never     Sexual activity: Not Currently     Partners: Male     Birth control/protection: Other   Other Topics Concern     Parent/sibling w/ CABG, MI or angioplasty before 65F 55M? Not Asked   Social History Narrative    Lives with mom and extended family but \"toxic environment\" per her report. She would like to move out but cannot financially do so. She has minimal support at home despite her significant SCD comorbidities and cognitive delay from stroke.     Social Determinants of Health     Financial Resource Strain:      Difficulty of Paying Living Expenses:    Food Insecurity:      Worried About Running Out of Food in the Last Year:      Ran Out of Food in the Last Year:    Transportation Needs:      Lack of Transportation (Medical):      Lack of Transportation (Non-Medical):    Physical Activity:      Days of Exercise per Week:      Minutes of Exercise per Session:    Stress:      Feeling of Stress " :    Social Connections:      Frequency of Communication with Friends and Family:      Frequency of Social Gatherings with Friends and Family:      Attends [employer] Services:      Active Member of Clubs or Organizations:      Attends Club or Organization Meetings:      Marital Status:    Intimate Partner Violence: Not At Risk     Fear of Current or Ex-Partner: No     Emotionally Abused: No     Physically Abused: No     Sexually Abused: No       Medications:  Current Outpatient Medications   Medication     acetaminophen (TYLENOL) 325 MG tablet     albuterol (PROAIR HFA/PROVENTIL HFA/VENTOLIN HFA) 108 (90 Base) MCG/ACT inhaler     albuterol (PROVENTIL) (2.5 MG/3ML) 0.083% neb solution     ARIPiprazole (ABILIFY) 2 MG tablet     aspirin (ASA) 81 MG chewable tablet     budesonide-formoterol (SYMBICORT) 160-4.5 MCG/ACT Inhaler     dabigatran ANTICOAGULANT (PRADAXA) 150 MG capsule     diclofenac (VOLTAREN) 1 % topical gel     diphenhydrAMINE (BENADRYL) 25 MG capsule     DULoxetine (CYMBALTA) 30 MG capsule     EPINEPHrine (ANY BX GENERIC EQUIV) 0.3 MG/0.3ML injection 2-pack     Hydroxyurea 1000 MG TABS     hydrOXYzine (ATARAX) 25 MG tablet     JADENU 360 MG tablet     lidocaine-prilocaine (EMLA) 2.5-2.5 % external cream     medroxyPROGESTERone (DEPO-PROVERA) 150 MG/ML IM injection     morphine (MS CONTIN) 15 MG CR tablet     naloxone (NARCAN) 4 MG/0.1ML nasal spray     omeprazole (PRILOSEC) 20 MG DR capsule     ondansetron (ZOFRAN) 8 MG tablet     oxyCODONE IR (ROXICODONE) 15 MG tablet     Current Facility-Administered Medications   Medication     medroxyPROGESTERone (DEPO-PROVERA) syringe 150 mg         Physical Exam:   There were no vitals taken for this visit.     Phone visit though she was in the infusion center today      Labs:   Results for HIMANSHU AL (MRN [id number]) as of 10/19/2021 21:55   Ref. Range 10/18/2021 12:03   Ferritin Latest Ref Range: 12 - 150 ng/mL 8,023 (H)   % Retic Latest Ref Range: 0.5 - 2.0 %  22.5 (H)   Absolute Retic Latest Ref Range: 0.025 - 0.095 10e6/uL 0.574 (H)     Imaging:   Ferriscan 8/18/21  History of sickle cell disease with chronic transfusions and extreme iron overload, currently on medication. Hemachromatosis due to  repeated. Blood cell transfusions.     COMPARISON: 3/25/2021     TECHNIQUE:     Sequential non-contrast spin-echo MRI imaging obtained of the liver with TR 2500 msec and progressively longer Miley up to 18 msec.     FINDINGS:     Average liver iron concentration is 32.5 mg/g dry tissue (normal = 0.17 - 1.8) previously 43  Average liver iron concentration is 581 mmol/kg dry tissue (normal = 3 - 33)     Other findings: Markedly increased iron deposition in the spleen on subjective evaluation.                                                                      IMPRESSION: Increased iron concentration in the liver and spleen.     I have personally reviewed the examination and initial interpretation and I agree with the findings.     DAYNA PAUL MD   ----------    Assessment:  Jennifer Cervantes is a 21 year old female with HbSS. Her disease has been complicated by stroke leading to some slight cognitive delay and right sided hemiparesis, high anxiety leading to frequent pain crises on top of chronic pain syndrome, poor social structure at home with no real support, and iron overload secondary to repeated transfusions.    She is stable overall and doing well with meds. She has chronic pain but we are able to keep her out of the ED by foregoing IV infusions there. She is going to try to hold off on in-clinic infusions over the next week too. We will try some opioid rotation and switch her long-acting to MS Contin. Ferriscan also ordered and we will get back to doing every other weekend chelation.       Major Topics of the Visit:  1. Pain Management: Updated pain plan. Only PO opioids in ED    -Crizanlizumab monthly (started June 2021)   2. Iron overload/Pharmacy Issues: Continue  Jadenu and Desferal. Needs audiology follow up as well, last done June 2020.  3. High RBC turnover: Jennifer really has a high degree of RBC turnover, more than most patients I have seen. Oxybryta led to more frequent pain episodes (chest pain, which was new) and did not change the RBC turnover so it was stopped in December. No change currently  4. Pulmonary Emboli + new RUE DVT (innominate vein) of unclear chronicity + new symptomatic R cephalic SVT this week: Now on dabigatran after having DVT/PE while on apixaban and rivaroxaban. Her levels have been therapeutic so it is odd that her metabolism of drugs is less predictable. We did start her back on ASA. She may have some pulmonary HTN with her recurrent chest discomfort and known PEs. Back on ASA  5. Stroke sequelae on right side: Follow up with  PM&R as needed  Follow up: Virtual with Andrei Machado in 2 weeks    Review of inpatient care as documented above   Review of the result(s) of each unique test - labs as above  Diagnosis or treatment significantly limited by social determinants of health - sickle cell disease, racial inequity, difficult social situation at home  Ordering of specific tests      Eric Duncan MD  Hematologist  Division of Hematology, Oncology, and Transplantation  [facility]  [facility]  Pager: [phone]

## 2021-10-18 NOTE — LETTER
10/18/2021         RE: Jennifer Cervantes  [address]  [facility] [address]        Dear Colleague,    Thank you for referring your patient, Jennifer Cervantes, to the [facility]. Please see a copy of my visit note below.           Sickle Cell Outpatient Follow Up Visit    Jennifer is a 21 year old who is being evaluated via a billable telephone visit.      What phone number would you like to be contacted at?[phone]  How would you like to obtain your AVS? Mail a copy  Phone call duration: 24 minutes           Date of encounter: Oct 18, 2021    Jennifer Cervantes is a 21 year old female who is being evaluated for regular care and ED follow ups related to SCD      Interval History: Since our last visit, Jennifer has stayed out of the ED though she has been to the infusion center most days. She denies any new major stresses though she did say her family is looking to move back to [place]. No issues with medications. She has been using the oxycodone every 4-6 hours as prescribed. She is interested in trying MS Contin instead of Oxycodone. She is ready to get back on board with the IV chelation.     She has been doing well with the dabigatran and aspirin.     No fevers or cough. No signs of infection.      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 She also has significant anxiety and depression with a strong anxiety about transferring to the adult clinic. She usually has pain crises secondary to the anxiety.       ---------------------------------------  Jennifer's Goals (discussed 10/19/2021 )    1-3 month goal:  Move into her own living space    6 month  goal:  Enroll in college classes    12 month goal:  TBD    Disease-specific goal(s):  Reduce ED trips and utilizing success in other non-medical endeavors to help with pain management  ---------------------------------------    --------------------------------  Plan last reviewed with patient: 9/20/2021    Patient background: 22yo F, enjoys movies and kids though there are times where she does not really want to talk to people. Does not have a lot of social support at home.     Sickle Cell Disease History  Primary Hematologist: Perla  PCP: Raul  Genotype: SS  Acute Pain Crisis Treatment: (avoiding IV opioids for now, which she has agreed to)    ER   o Oxycodone 15-20 mg x1  o Toradol 15 mg IV x1   o Maintenance IV fluids with LR  o Other: Zofran 8 mg IV PRN nausea    Inpatient:  o Home oxycodone/Oxycontin regimen, though home oxycodone dosing could be increased to 20 mg to start  o PCA plan:   - None for now  o Other Medications: Zofran  o She has had success with ketamine starting at 4mg/h and advancing only to 6mg/h, as 8mg/h made her feel quite poorly.  o ASA/Dabigatran   o Supportive Care: Docusate, Senna  Chronic Pain Medications:    Home regimen Oxycontin 10 mg q12h, oxycodone 15 mg p.o. q.4-6 hours p.r.n. breakthrough pain.  She also continues on Voltaren gel, and Zoloft among other medications.    -Also benefits from mental health visits, acupuncture  Baseline Hemoglobin: 7 g/dl without chronic transfusions  Hydroxyurea use: Yes  H/O blood transfusions: Yes, several (iron overload) Most recent 7/13/2021    H/O Transfusion Reactions: no    Antibodies:none  H/O Acute Chest Syndrome: Yes    Last episode: 10/2019     ICU/intubation: unsure  H/O Stroke: Yes (managed with chronic transfusions in the past, stopped late Spring 2020)  H/O VTE: Yes (2/2021)  H/O Cholecystectomy or Splenectomy: no  H/O Asthma, Pulm HTN, AVN, Leg Ulcers, Nephropathy, Retinopathy, etc: Iron overload, asthma, chronic lung disease,  physical limitations from early stroke     -------------------------------------------    Sickle Cell Disease Comprehensive Checklist    Bone Health/Avascular Necrosis Screening/Symptoms (each visit): no new concerns today    Leg Ulcer evaluation (every visit): none    Hypertension (every visit): borderline hypertensive recently but improved later in evening and previous day on most of ED visits    Last pulmonary evaluation (asthma, AMAN, pulm HTN): within last year( due again)    Stroke/silent cerebral infarct Hx (Y/N): Yes TIA ~2014, first event ~age 1 with full stroke and R sided weakness    Last PCP Visit: 8/2020    Vaccines:  o PCV13: 5/13/19  o Pneumovax (PPSV23): 3/04, 10/09, 7/12/19 (next due 7/2024)  o Menactra: 4/2010, 9/2015 (MCV done 8/16)  o Influenza: got 20-21 vaccine per self report    Audiology (chelation): done 6/2020, normal (due again)    Past Medical History:  Past Medical History:   Diagnosis Date     Anxiety      Bleeding disorder (H)      Blood clotting disorder (H)      Cerebral infarction (H) 2015     Cognitive developmental delay     low IQ. 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 (H)     right hand contractures     Iron overload due to repeated red blood cell transfusions      Migraines      Multiple subsegmental pulmonary emboli without acute cor pulmonale (H) 02/01/2021     Oppositional defiant behavior      Superficial venous thrombosis of arm, right 03/25/2021     Uncomplicated asthma        Past Surgical History:  Past Surgical History:   Procedure Laterality Date     AS INSERT TUNNELED CV 2 CATH W/O PORT/PUMP       C BREAST REDUCTION (INCLUDES LIPO) TIER 3 Bilateral 04/23/2019     CHOLECYSTECTOMY       INSERT PORT VASCULAR ACCESS Left 4/21/2021    Procedure: INSERTION, VASCULAR ACCESS PORT (NOT SURE ON SIDE UNTIL REMOVAL);  Surgeon: Rajan More MD;   "Location: [facility] OR     IR CHEST PORT PLACEMENT > 5 YRS OF AGE  4/21/2021     IR CVC NON TUNNEL LINE REMOVAL  5/6/2021     IR CVC NON TUNNEL PLACEMENT  04/07/2020     IR CVC NON TUNNEL PLACEMENT  4/30/2021     IR PORT REMOVAL LEFT  4/21/2021     REMOVE PORT VASCULAR ACCESS Left 4/21/2021    Procedure: REMOVAL, VASCULAR ACCESS PORT LEFT;  Surgeon: Rajan More MD;  Location: [facility] OR     REPAIR TENDON ELBOW Right 10/02/2019    Procedure: Right Elbow Flexor Lengthening, Flexor Pronator Slide Of Wrist and Finger, Thumb Adductor Lengthening;  Surgeon: Anai Franco MD;  Location: [facility] OR     TONSILLECTOMY Bilateral 10/02/2019    Procedure: Bilateral Tonsillectomy;  Surgeon: Farhana Guy MD;  Location: [facility] OR     Social History:  Social History     Socioeconomic History     Marital status: Single     Spouse name: Not on file     Number of children: Not on file     Years of education: Not on file     Highest education level: Not on file   Occupational History     Not on file   Tobacco Use     Smoking status: Never Smoker     Smokeless tobacco: Never Used   Substance and Sexual Activity     Alcohol use: Not Currently     Alcohol/week: 0.0 standard drinks     Drug use: Never     Sexual activity: Not Currently     Partners: Male     Birth control/protection: Other   Other Topics Concern     Parent/sibling w/ CABG, MI or angioplasty before 65F 55M? Not Asked   Social History Narrative    Lives with mom and extended family but \"toxic environment\" per her report. She would like to move out but cannot financially do so. She has minimal support at home despite her significant SCD comorbidities and cognitive delay from stroke.     Social Determinants of Health     Financial Resource Strain:      Difficulty of Paying Living Expenses:    Food Insecurity:      Worried About Running Out of Food in the Last Year:      Ran Out of Food in the Last Year:    Transportation Needs:      Lack of Transportation (Medical):  "     Lack of Transportation (Non-Medical):    Physical Activity:      Days of Exercise per Week:      Minutes of Exercise per Session:    Stress:      Feeling of Stress :    Social Connections:      Frequency of Communication with Friends and Family:      Frequency of Social Gatherings with Friends and Family:      Attends Evangelical Services:      Active Member of Clubs or Organizations:      Attends Club or Organization Meetings:      Marital Status:    Intimate Partner Violence: Not At Risk     Fear of Current or Ex-Partner: No     Emotionally Abused: No     Physically Abused: No     Sexually Abused: No       Medications:  Current Outpatient Medications   Medication     acetaminophen (TYLENOL) 325 MG tablet     albuterol (PROAIR HFA/PROVENTIL HFA/VENTOLIN HFA) 108 (90 Base) MCG/ACT inhaler     albuterol (PROVENTIL) (2.5 MG/3ML) 0.083% neb solution     ARIPiprazole (ABILIFY) 2 MG tablet     aspirin (ASA) 81 MG chewable tablet     budesonide-formoterol (SYMBICORT) 160-4.5 MCG/ACT Inhaler     dabigatran ANTICOAGULANT (PRADAXA) 150 MG capsule     diclofenac (VOLTAREN) 1 % topical gel     diphenhydrAMINE (BENADRYL) 25 MG capsule     DULoxetine (CYMBALTA) 30 MG capsule     EPINEPHrine (ANY BX GENERIC EQUIV) 0.3 MG/0.3ML injection 2-pack     Hydroxyurea 1000 MG TABS     hydrOXYzine (ATARAX) 25 MG tablet     JADENU 360 MG tablet     lidocaine-prilocaine (EMLA) 2.5-2.5 % external cream     medroxyPROGESTERone (DEPO-PROVERA) 150 MG/ML IM injection     morphine (MS CONTIN) 15 MG CR tablet     naloxone (NARCAN) 4 MG/0.1ML nasal spray     omeprazole (PRILOSEC) 20 MG DR capsule     ondansetron (ZOFRAN) 8 MG tablet     oxyCODONE IR (ROXICODONE) 15 MG tablet     Current Facility-Administered Medications   Medication     medroxyPROGESTERone (DEPO-PROVERA) syringe 150 mg         Physical Exam:   There were no vitals taken for this visit.     Phone visit though she was in the infusion center today      Labs:   Results for MIKAELA,  HIMANSHU NEWMAN (MRN [id number]) as of 10/19/2021 21:55   Ref. Range 10/18/2021 12:03   Ferritin Latest Ref Range: 12 - 150 ng/mL 8,023 (H)   % Retic Latest Ref Range: 0.5 - 2.0 % 22.5 (H)   Absolute Retic Latest Ref Range: 0.025 - 0.095 10e6/uL 0.574 (H)     Imaging:   Ferriscan 8/18/21  History of sickle cell disease with chronic transfusions and extreme iron overload, currently on medication. Hemachromatosis due to  repeated. Blood cell transfusions.     COMPARISON: 3/25/2021     TECHNIQUE:     Sequential non-contrast spin-echo MRI imaging obtained of the liver with TR 2500 msec and progressively longer Miley up to 18 msec.     FINDINGS:     Average liver iron concentration is 32.5 mg/g dry tissue (normal = 0.17 - 1.8) previously 43  Average liver iron concentration is 581 mmol/kg dry tissue (normal = 3 - 33)     Other findings: Markedly increased iron deposition in the spleen on subjective evaluation.                                                                      IMPRESSION: Increased iron concentration in the liver and spleen.     I have personally reviewed the examination and initial interpretation and I agree with the findings.     DAYNA PAUL MD   ----------    Assessment:  Himanshu Cervantes is a 21 year old female with HbSS. Her disease has been complicated by stroke leading to some slight cognitive delay and right sided hemiparesis, high anxiety leading to frequent pain crises on top of chronic pain syndrome, poor social structure at home with no real support, and iron overload secondary to repeated transfusions.    She is stable overall and doing well with meds. She has chronic pain but we are able to keep her out of the ED by foregoing IV infusions there. She is going to try to hold off on in-clinic infusions over the next week too. We will try some opioid rotation and switch her long-acting to MS Contin. Ferriscan also ordered and we will get back to doing every other weekend chelation.       Major  Topics of the Visit:  1. Pain Management: Updated pain plan. Only PO opioids in ED    -Crizanlizumab monthly (started June 2021)   2. Iron overload/Pharmacy Issues: Continue Jadenu and Desferal. Needs audiology follow up as well, last done June 2020.  3. High RBC turnover: Jennifer really has a high degree of RBC turnover, more than most patients I have seen. Oxybryta led to more frequent pain episodes (chest pain, which was new) and did not change the RBC turnover so it was stopped in December. No change currently  4. Pulmonary Emboli + new RUE DVT (innominate vein) of unclear chronicity + new symptomatic R cephalic SVT this week: Now on dabigatran after having DVT/PE while on apixaban and rivaroxaban. Her levels have been therapeutic so it is odd that her metabolism of drugs is less predictable. We did start her back on ASA. She may have some pulmonary HTN with her recurrent chest discomfort and known PEs. Back on ASA  5. Stroke sequelae on right side: Follow up with  PM&R as needed  Follow up: Virtual with Andrei Machado in 2 weeks    Review of inpatient care as documented above   Review of the result(s) of each unique test - labs as above  Diagnosis or treatment significantly limited by social determinants of health - sickle cell disease, racial inequity, difficult social situation at home  Ordering of specific tests      Eric Duncan MD  Hematologist  Division of Hematology, Oncology, and Transplantation  [facility]  [facility]  Pager: [phone]              Again, thank you for allowing me to participate in the care of your patient.        Sincerely,        Eric Duncan MD

## 2021-10-18 NOTE — TELEPHONE ENCOUNTER
PA Initiation    Medication: Morhpine - APPROVED  Insurance Company: [id number] [phone] Fax [phone]  Pharmacy Filling the Rx:    Filling Pharmacy Phone:    Filling Pharmacy Fax:    Start Date: 10/18/2021    Prior Authorization Approval    Authorization Effective Date: 10/18/2021  Authorization Expiration Date: 10/18/2022  Medication: Morhpine - APPROVED  Approved Dose/Quantity:   Reference #:     Insurance Company: [employer] - Phone [phone] Fax [phone]  Expected CoPay:       CoPay Card Available:      Foundation Assistance Needed:    Which Pharmacy is filling the prescription (Not needed for infusion/clinic administered):    Pharmacy Notified:    Patient Notified:          Caron Martinez CPhT  [facility]  Oncology Pharmacy Liaison  [email or web address]  Phone: [phone]  Fax: [phone]

## 2021-10-18 NOTE — PROGRESS NOTES
Infusion Nursing Note:  Jennifer Cervantes presents today for IVF/pain medication.    Patient seen by provider today: No   present during visit today: Not Applicable.    Note: Patient arrived at infusion with non radiating intermittent sharp lower hip pain with PS 9/10. Denies fever/chills. Denies nausea/vomiting nor chest and abdominal discomfort. No new concerns made. Otherwise well.     Upon discharge patient had 3 doses of Morphine with PS 4/10. Patient agreed to go home. No new concern made. Patient verbalized knowledge on where and when to call if symptoms persists/worsen.       Intravenous Access:  Implanted Port.    Treatment Conditions:  Not Applicable.      Post Infusion Assessment:  Patient tolerated infusion without incident.  Blood return noted pre and post infusion.  Site patent and intact, free from redness, edema or discomfort.  No evidence of extravasations.  Access discontinued per protocol.       Discharge Plan:   Patient declined prescription refills.  Discharge instructions reviewed with: Patient.  Patient and/or family verbalized understanding of discharge instructions and all questions answered.  AVS to patient via Clinical Data[employer].  Patient will return 10/20/21  for next appointment.   Patient discharged in stable condition accompanied by: self.  Departure Mode: Ambulatory.      GLORIA YANCEY RN

## 2021-10-19 ENCOUNTER — PRE VISIT (OUTPATIENT)
Dept: NEUROLOGY | Facility: CLINIC | Age: 21
End: 2021-10-19

## 2021-10-19 ENCOUNTER — APPOINTMENT (OUTPATIENT)
Dept: LAB | Facility: CLINIC | Age: 21
End: 2021-10-19
Attending: PEDIATRICS
Payer: COMMERCIAL

## 2021-10-19 ENCOUNTER — VIRTUAL VISIT (OUTPATIENT)
Dept: NEUROLOGY | Facility: CLINIC | Age: 21
End: 2021-10-19
Attending: PHYSICIAN ASSISTANT
Payer: COMMERCIAL

## 2021-10-19 ENCOUNTER — PATIENT OUTREACH (OUTPATIENT)
Dept: CARE COORDINATION | Facility: CLINIC | Age: 21
End: 2021-10-19

## 2021-10-19 ENCOUNTER — NURSE TRIAGE (OUTPATIENT)
Dept: ONCOLOGY | Facility: CLINIC | Age: 21
End: 2021-10-19

## 2021-10-19 ENCOUNTER — INFUSION THERAPY VISIT (OUTPATIENT)
Dept: ONCOLOGY | Facility: CLINIC | Age: 21
End: 2021-10-19
Attending: PHYSICIAN ASSISTANT
Payer: COMMERCIAL

## 2021-10-19 VITALS
SYSTOLIC BLOOD PRESSURE: 134 MMHG | TEMPERATURE: 98.3 F | BODY MASS INDEX: 29.13 KG/M2 | WEIGHT: 169.7 LBS | DIASTOLIC BLOOD PRESSURE: 76 MMHG | HEART RATE: 102 BPM | RESPIRATION RATE: 16 BRPM | OXYGEN SATURATION: 95 %

## 2021-10-19 DIAGNOSIS — D57.00 SICKLE CELL PAIN CRISIS (H): ICD-10-CM

## 2021-10-19 DIAGNOSIS — D57.00 SICKLE CELL CRISIS (H): ICD-10-CM

## 2021-10-19 DIAGNOSIS — G81.10 SPASTIC HEMIPLEGIA, UNSPECIFIED ETIOLOGY, UNSPECIFIED LATERALITY (H): Primary | ICD-10-CM

## 2021-10-19 DIAGNOSIS — Z86.73 HISTORY OF STROKE: ICD-10-CM

## 2021-10-19 DIAGNOSIS — G43.909 MIGRAINE WITHOUT STATUS MIGRAINOSUS, NOT INTRACTABLE, UNSPECIFIED MIGRAINE TYPE: Primary | ICD-10-CM

## 2021-10-19 PROBLEM — Z78.9 USES CONTRACEPTION: Status: ACTIVE | Noted: 2021-10-19

## 2021-10-19 PROBLEM — R45.4 FEELING ANGRY: Status: ACTIVE | Noted: 2021-10-19

## 2021-10-19 PROCEDURE — 250N000011 HC RX IP 250 OP 636: Performed by: PEDIATRICS

## 2021-10-19 PROCEDURE — 99202 OFFICE O/P NEW SF 15 MIN: CPT | Mod: TEL | Performed by: PSYCHIATRY & NEUROLOGY

## 2021-10-19 PROCEDURE — 96376 TX/PRO/DX INJ SAME DRUG ADON: CPT

## 2021-10-19 PROCEDURE — 96361 HYDRATE IV INFUSION ADD-ON: CPT

## 2021-10-19 PROCEDURE — 96375 TX/PRO/DX INJ NEW DRUG ADDON: CPT

## 2021-10-19 PROCEDURE — 258N000003 HC RX IP 258 OP 636: Performed by: PEDIATRICS

## 2021-10-19 PROCEDURE — 250N000011 HC RX IP 250 OP 636: Performed by: PHYSICIAN ASSISTANT

## 2021-10-19 PROCEDURE — 96365 THER/PROPH/DIAG IV INF INIT: CPT

## 2021-10-19 RX ORDER — HEPARIN SODIUM (PORCINE) LOCK FLUSH IV SOLN 100 UNIT/ML 100 UNIT/ML
5 SOLUTION INTRAVENOUS ONCE
Status: COMPLETED | OUTPATIENT
Start: 2021-10-19 | End: 2021-10-19

## 2021-10-19 RX ORDER — OXYCODONE HYDROCHLORIDE 15 MG/1
TABLET ORAL
Qty: 50 TABLET | Refills: 0 | Status: SHIPPED | OUTPATIENT
Start: 2021-10-19 | End: 2021-10-29

## 2021-10-19 RX ORDER — ONDANSETRON 8 MG/1
8 TABLET, FILM COATED ORAL
Status: CANCELLED
Start: 2022-01-01

## 2021-10-19 RX ORDER — DIPHENHYDRAMINE HCL 25 MG
25 CAPSULE ORAL
Status: CANCELLED
Start: 2022-01-01

## 2021-10-19 RX ORDER — MORPHINE SULFATE 2 MG/ML
2 INJECTION, SOLUTION INTRAMUSCULAR; INTRAVENOUS
Status: CANCELLED
Start: 2022-01-01

## 2021-10-19 RX ORDER — HEPARIN SODIUM (PORCINE) LOCK FLUSH IV SOLN 100 UNIT/ML 100 UNIT/ML
5 SOLUTION INTRAVENOUS
Status: DISCONTINUED | OUTPATIENT
Start: 2021-10-19 | End: 2021-10-19 | Stop reason: HOSPADM

## 2021-10-19 RX ORDER — HEPARIN SODIUM,PORCINE 10 UNIT/ML
5 VIAL (ML) INTRAVENOUS
Status: CANCELLED | OUTPATIENT
Start: 2022-01-01

## 2021-10-19 RX ORDER — HEPARIN SODIUM (PORCINE) LOCK FLUSH IV SOLN 100 UNIT/ML 100 UNIT/ML
5 SOLUTION INTRAVENOUS
Status: CANCELLED | OUTPATIENT
Start: 2022-01-01

## 2021-10-19 RX ORDER — OXYCODONE HYDROCHLORIDE 15 MG/1
TABLET ORAL
Qty: 50 TABLET | Refills: 0 | Status: SHIPPED | OUTPATIENT
Start: 2021-10-19 | End: 2021-10-19

## 2021-10-19 RX ORDER — MORPHINE SULFATE 2 MG/ML
2 INJECTION, SOLUTION INTRAMUSCULAR; INTRAVENOUS
Status: DISCONTINUED | OUTPATIENT
Start: 2021-10-19 | End: 2021-10-19 | Stop reason: HOSPADM

## 2021-10-19 RX ADMIN — SODIUM CHLORIDE 250 ML: 9 INJECTION, SOLUTION INTRAVENOUS at 14:24

## 2021-10-19 RX ADMIN — MORPHINE SULFATE 2 MG: 2 INJECTION, SOLUTION INTRAMUSCULAR; INTRAVENOUS at 14:22

## 2021-10-19 RX ADMIN — DEXTROSE AND SODIUM CHLORIDE 500 ML: 5; 450 INJECTION, SOLUTION INTRAVENOUS at 13:39

## 2021-10-19 RX ADMIN — SODIUM CHLORIDE 400 MG: 9 INJECTION, SOLUTION INTRAVENOUS at 14:24

## 2021-10-19 RX ADMIN — MORPHINE SULFATE 2 MG: 2 INJECTION, SOLUTION INTRAMUSCULAR; INTRAVENOUS at 13:34

## 2021-10-19 RX ADMIN — MORPHINE SULFATE 2 MG: 2 INJECTION, SOLUTION INTRAMUSCULAR; INTRAVENOUS at 15:26

## 2021-10-19 RX ADMIN — Medication 5 ML: at 16:06

## 2021-10-19 RX ADMIN — Medication 5 ML: at 13:06

## 2021-10-19 ASSESSMENT — PAIN SCALES - GENERAL: PAINLEVEL: EXTREME PAIN (8)

## 2021-10-19 NOTE — LETTER
10/19/2021       RE: Jennifer Cervantes  [address]  [facility] [address]     Dear Colleague,    Thank you for referring your patient, Jennifer Cervantes, to the [facility] [place] at [facility]. Please see a copy of my visit note below.    Service Date: 10/19/2021    Suraj Case MD  [facility]  [address] [place] [address]    RE:     Jennifer Cervantes  MRN:  [id number]  :   2000    Dear Dr. Case:    We had the privilege of seeing Ms. Jennifer Cervantes on a video call today for Neurology.  At the time we saw her, she did not have any neurological complaints and said that she had seen many doctors and did really want to go through with anything further.  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    We did not pursue anything further. Later, we found out that she was referred for migraine management, something we were not aware of, and we will call her back and see if she wants to go through an evaluation for that.  She reported that there is stable right paralysis and she is getting Botox injections.  She had 1 yesterday for the spasticity.  She has had no seizures related to these.  She never had TIA or anything focal in relationship to that.  We will call her and if she wishes, evaluation for her migraine.    Thank you for referring her to Neurology.    Sincerely,    Jonas Cervantes MD        Service Date: 10/19/2021    Ms. Jennifer Cervantes was called back after I spoke with her primary doctor, who said she wanted to be seen because of headaches.  We evaluated her fully for the headaches.  The story is that for some time, she cannot tell me how much, she has had these episodes of a severe headache, which start on the left side of her temple and face where she has a sensation of pain.  These are occurring 2-3 times per week.  She does not have any visual symptoms  associated with them or aura, but she does have nausea with them, and she may run to the bathroom when she experiences that sensation.  She says that there is a family history of migraine in a family member.  She is on a number of medications for pain related to sickle cell, including MS Contin and the like.     I reviewed her imaging studies of her brain.  Her last MRI was in January of last year.  It was an MRI of the brain and MRA, and there was a conclusion of a stable appearance of the brain and cerebrovascular compared with 2015 and no remote left middle cerebral artery territory infarct.  No acute infarct and no abnormalities on the MRA.    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  There was also the suggestion of tiny collateral vessels about the right mid-cerebral artery and similar findings to previous.  No focal stenosis was identified at that time. She had cystic encephalomalacia within the left perisylvian, frontoparietal and temporal lobe consistent with prior middle cerebral artery territory infarction.  The volume loss and FLAIR signal are unchanged.  There was a stable appearance of the ex vacuo dilatation of the left lateral ventricles.    In summary, the patient could not be examined today.  We will set her up for an office visit and complete that if necessary.  In the meantime, it sounds like there is some migrainous-like component to these headaches, and they are curiously on the left side and the side of the suggested moyamoya phenomenon related to the infarct.  She has cystic encephalomalacia in that area consistent with prior middle cerebral artery territory infarction.  I told her we will discuss with the physician to see if there is any contraindication from Dr. Penaloza's point of view for the use of sumatriptan and Imitrex in the nasal form.  She is already on pain medication with MS Contin and oxycodone, so we will have  to get his approval.  I will write to him and consider that, and then we will see her in the office.    Sincerely,    Jonas Cervantes MD

## 2021-10-19 NOTE — PROGRESS NOTES
Social Work Follow-Up  [facility]    Data/Intervention:  Patient Name:  Jennifer Cervantes  /Age:  2000 (21 year old)    Reason for Follow-Up:  Transportation    Collaborated With:    -Patient  -[facility]    Intervention/Education/Resources Provided:  SW received phone call from Triage that patient is needing a ride for their 1 pm appointment. SW called patient's insurance and ride was arranged through [employer] ([phone]). SW called patient and informed them of their ride detail. SW encouraged patient to call if there are any issues with their transportation.    Assessment/Plan:  SW will remain available as needed.  Previously provided patient/family with writer's contact information and availability.       CARLOS Chavez,LGSW  Hematology/Oncology Social Worker  Phone:[phone] Pager: [phone]

## 2021-10-19 NOTE — PROGRESS NOTES
This is a recent snapshot of the patient's [facility] medical record.  For current drug dose and complete information and questions, call [phone] or In Basket pool, [facility] home infusion ([id number])  CSN Number:  [id number]

## 2021-10-19 NOTE — TELEPHONE ENCOUNTER
Jennifer is calling to say that the pharmacy did not fill her oxycodone because they have not received the RX. Pharmacy at [facility],  Mail/Specialty Pharmacy has received the order. Will re-pend and route to Dr. Duncan to sign.  This RN called  Mail/Specialty pharmacy to ask them to delete the request for oxy refill. Bladen agreed to do this.    Refill Request    Date of most recent appointment:  10/18/21  Next upcoming appointment:   11/1/21    Medication requested:  Oxycodone  Quantity:  50  Last fill date:    Person requesting refill:  Jennifer  Notes:  Medication was pended up to the wrong pharmacy. Needs RX filled at [facility] today. Pt is in Infusion right now. Asked  Mail order pharmacy to delete order for oxy.    Prescribing provider(s):  David Duncan MD  Not  reviewed.    Routed to Dr. Duncan.

## 2021-10-19 NOTE — LETTER
10/19/2021       RE: Jennifer Cervantes  [address]  [facility] [place] [address]     Dear Colleague,    Thank you for referring your patient, Jennifer Cervantes, to the [facility]. Please see a copy of my visit note below.    Jennifer is a 21 year old who is being evaluated via a billable tel visit.      How would you like to obtain your AVS? MyChart  If the video visit is dropped, the invitation should be resent by: Text to cell phone: [phone]      Tel consent obtained,  Tel visit 35 min.  charles        Again, thank you for allowing me to participate in the care of your patient.      Sincerely,    Jonas Cervantes MD

## 2021-10-19 NOTE — PATIENT INSTRUCTIONS
Contact Numbers  [facility]: [phone]    After Hours:  [phone]  Triage: [phone]    Please call the [facility] Triage line if you experience a temperature greater than or equal to 100.5, shaking chills, have uncontrolled nausea, vomiting and/or diarrhea, dizziness, shortness of breath, chest pain, bleeding, unexplained bruising, or if you have any other new/concerning symptoms, questions or concerns.     If it is after hours, weekends, or holidays, please call the main hospital  at  [phone] and ask to speak to the Oncology doctor on call.     If you are having any concerning symptoms or wish to speak to a provider before your next infusion visit, please call your care coordinator or triage to notify them so we can adequately serve you.     If you need a refill on a narcotic prescription or other medication, please call triage before your infusion appointment.         October 2021 Sunday Monday Tuesday Wednesday Thursday Friday Saturday                            1    ONC INFUSION 2 HR (120 MIN)   1:00 PM   (120 min.)    ONC INFUSION NURSE   [facility] 2       3     4    ONC INFUSION 2 HR (120 MIN)  11:00 AM   (120 min.)    ONC INFUSION NURSE   [facility] 5     6    ONC INFUSION 2 HR (120 MIN)   9:00 AM   (120 min.)    ONC INFUSION NURSE   [facility] 7    BMT INFUSION 2 HR (120 MIN)   1:00 PM   (120 min.)    BMT INFUSION NURSE   [facility] Blood and Marrow Transplant [facility] [place] 8    INFUSION 2.5 HR (150 MIN)   9:30 AM   (150 min.)   UR CH 04   [facility] Infusion Services [facility] 9       10     11    ONC INFUSION 2 HR (120 MIN)   8:30 AM   (120 min.)   UC ONC INFUSION NURSE   [facility] 12    ONC INFUSION 2 HR (120 MIN)  11:00 AM   (120 min.)    ONC INFUSION NURSE   [facility] 13    RETURN  11:15 AM   (60  min.)   Katherine Pierce MD   [facility]    ONC INFUSION 2 HR (120 MIN)   1:00 PM   (120 min.)    ONC INFUSION NURSE   [facility] 14     15    ONC INFUSION 2 HR (120 MIN)  10:00 AM   (120 min.)    ONC INFUSION NURSE   [facility] 16       17     18    LAB CENTRAL  11:15 AM   (15 min.)   [facility]   [facility]    ONC INFUSION 2 HR (120 MIN)  12:00 PM   (120 min.)    ONC INFUSION NURSE   [facility]    TELEPHONE VISIT RETURN  12:00 PM   (30 min.)   Eric Duncan MD   [facility] 19    VIDEO VISIT NEW   8:45 AM   (60 min.)   Jonas Cervantes MD   [facility] Neurology [facility]    ONC INFUSION 2 HR (120 MIN)  12:00 PM   (120 min.)    ONC INFUSION NURSE   [facility]    LAB CENTRAL  12:45 PM   (15 min.)    [facility] DRAW   [facility] 20     21     22     23       24     25     26     27     28     29     30       31                                               November 2021 Sunday Monday Tuesday Wednesday Thursday Friday Saturday        1    VIDEO VISIT RETURN   9:00 AM   (60 min.)   Andrei Machado PA   [facility] 2     3     4     5     6       7     8     9     10     11     12     13       14     15     16     17     18     19     20       21     22     23     24    P NURSE VISIT   9:00 AM   (30 min.)   Nurse, [facility] Internal Medicine [place] 25     26     27       28     29     30                                         Lab Results:  Recent Results (from the past 12 hour(s))   Basic metabolic panel    Collection Time: 10/19/21  1:13 PM   Result Value Ref Range    Sodium 138 133 - 144 mmol/L    Potassium 3.9 3.4 - 5.3 mmol/L    Chloride 112 (H) 94 - 109 mmol/L    Carbon  Dioxide (CO2) 21 20 - 32 mmol/L    Anion Gap 5 3 - 14 mmol/L    Urea Nitrogen 11 7 - 30 mg/dL    Creatinine 0.61 0.52 - 1.04 mg/dL    Calcium 8.7 8.5 - 10.1 mg/dL    Glucose 98 70 - 99 mg/dL    GFR Estimate >90 >60 mL/min/1.73m2   Reticulocyte count    Collection Time: 10/19/21  1:13 PM   Result Value Ref Range    % Reticulocyte 20.0 (H) 0.5 - 2.0 %    Absolute Reticulocyte 0.468 (H) 0.025 - 0.095 10e6/uL   CBC with platelets and differential    Collection Time: 10/19/21  1:13 PM   Result Value Ref Range    WBC Count 11.8 (H) 4.0 - 11.0 10e3/uL    RBC Count 2.34 (L) 3.80 - 5.20 10e6/uL    Hemoglobin 7.8 (L) 11.7 - 15.7 g/dL    Hematocrit 21.8 (L) 35.0 - 47.0 %    MCV 93 78 - 100 fL    MCH 33.3 (H) 26.5 - 33.0 pg    MCHC 35.8 31.5 - 36.5 g/dL    RDW 23.7 (H) 10.0 - 15.0 %    Platelet Count 264 150 - 450 10e3/uL   Manual Differential    Collection Time: 10/19/21  1:13 PM   Result Value Ref Range    % Neutrophils 74 %    % Lymphocytes 12 %    % Monocytes 8 %    % Eosinophils 5 %    % Basophils 1 %    NRBCs per 100 WBC 16 (H) <=0 %    Absolute Neutrophils 8.7 (H) 1.6 - 8.3 10e3/uL    Absolute Lymphocytes 1.4 0.8 - 5.3 10e3/uL    Absolute Monocytes 0.9 0.0 - 1.3 10e3/uL    Absolute Eosinophils 0.6 0.0 - 0.7 10e3/uL    Absolute Basophils 0.1 0.0 - 0.2 10e3/uL    Absolute NRBCs 1.9 (H) <=0.0 10e3/uL    RBC Morphology Confirmed RBC Indices     Platelet Assessment  Automated Count Confirmed. Platelet morphology is normal.     Automated Count Confirmed. Platelet morphology is normal.    Polychromasia Moderate (A) None Seen    Sickle Cells Marked (A) None Seen    Target Cells Slight (A) None Seen

## 2021-10-19 NOTE — PROGRESS NOTES
Jennifer is a 21 year old who is being evaluated via a billable tel visit.      How would you like to obtain your AVS? MyChart  If the video visit is dropped, the invitation should be resent by: Text to cell phone: [phone]      Tel consent obtained,  Tel visit 35 min.  fiol

## 2021-10-19 NOTE — PROGRESS NOTES
Service Date: 10/19/2021    Suraj Case MD  [facility]  [address] [place] [address]    RE:     Himanshu Al  MRN:  [id number]  :   2000    Dear Dr. Case:    We had the privilege of seeing Ms. Himanshu Al on a video call today for Neurology.  At the time we saw her, she did not have any neurological complaints and said that she had seen many doctors and did really want to go through with anything further.  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    We did not pursue anything further. Later, we found out that she was referred for migraine management, something we were not aware of, and we will call her back and see if she wants to go through an evaluation for that.  She reported that there is stable right paralysis and she is getting Botox injections.  She had 1 yesterday for the spasticity.  She has had no seizures related to these.  She never had TIA or anything focal in relationship to that.  We will call her and if she wishes, evaluation for her migraine.    Thank you for referring her to Neurology.    Sincerely,    Jonas Cervantes MD        D: 10/19/2021   T: 10/19/2021   MT: al    Name:     HIMANSHU AL  MRN:      [id number]        Account:      [id number]   :      2000           Service Date: 10/19/2021       Document: [id number]

## 2021-10-19 NOTE — TELEPHONE ENCOUNTER
[facility] Triage    Incoming call:  RX refill    Refill Request    Date of most recent appointment:  10/18  Next upcoming appointment:   none    Medication requested:  Ocydocone IR 15 mg  Quantity:  50  Last fill date:  10/8/21  Person requesting refill:  Jennifer  Notes:  N/A    Prescribing provider(s):  Perla    How are you taking : 1 every 6 hours      Are you having any side effects: No    How many do you have left: 1 day    Is this managing your pain: yes    What pharmacy: [facility]    Routing to Singh Roth

## 2021-10-19 NOTE — TELEPHONE ENCOUNTER
[facility] Triage    Pt called in to triage requesting IVF pain meds for lower back pain rated 8/10 x few hours.  Stated last took Oxycotin, oxycodone and tyl at 6 am this morning without relief. Denied any fevers, chills, cough, sob, chest pain or other symptoms. Pt's last infusion was 10/18 last clinic visit 10/18 telephone with Dr. Duncan with no  follow-up scheduled. Patient states they does not have own ride and it will take 20 long to get to cancer clinic. Pt needs oxycodone refill.  Pt qualifies for sickle cell standing order protocol.    Will send separate RX refill request.    1 pm for IVF and Pain meds in BMT    Routing to Perla and Amanda Roth

## 2021-10-19 NOTE — PROGRESS NOTES
Infusion Nursing Note:  Jennifer Cervantes presents today for Crizanlizumab/IVF/pain medication.    Patient seen by provider today: No   present during visit today: Not Applicable.    Note: Patient endorses hip and back pain with PS 9/10. Patient verbalized that it her usual sickle cell crisis pain. No new concerns made. Otherwise well. Patient agreed to have Crizanlizumab infusion today and cancel appointment tomorrow. No new complaints made. Otherwise well.    Upon discharge patient had 3 doses of Morphine with PS 6/10, Patient agreed to go home. Patient verbalized knowledge on where and when if symptoms persists.       Intravenous Access:  Implanted Port.    Treatment Conditions:  Not Applicable.      Post Infusion Assessment:  Patient tolerated infusion without incident.  Patient observed for 30 minutes post Crizalizumab per protocol.  Blood return noted pre and post infusion.  Site patent and intact, free from redness, edema or discomfort.  No evidence of extravasations.  Access discontinued per protocol.       Discharge Plan:   Patient declined prescription refills.  Discharge instructions reviewed with: Patient.  Patient and/or family verbalized understanding of discharge instructions and all questions answered.  AVS to patient via That{img}.  Patient will return 11/1/21 for next appointment.   Patient discharged in stable condition accompanied by: self.  Departure Mode: Ambulatory.      GLORIA YANCEY RN

## 2021-10-20 ENCOUNTER — HOME INFUSION (PRE-WILLOW HOME INFUSION) (OUTPATIENT)
Dept: PHARMACY | Facility: CLINIC | Age: 21
End: 2021-10-20

## 2021-10-20 NOTE — PROGRESS NOTES
Service Date: 10/19/2021    Ms. Jennifer Cervantes was called back after I spoke with her primary doctor, who said she wanted to be seen because of headaches.  We evaluated her fully for the headaches.  The story is that for some time, she cannot tell me how much, she has had these episodes of a severe headache, which start on the left side of her temple and face where she has a sensation of pain.  These are occurring 2-3 times per week.  She does not have any visual symptoms associated with them or aura, but she does have nausea with them, and she may run to the bathroom when she experiences that sensation.  She says that there is a family history of migraine in a family member.  She is on a number of medications for pain related to sickle cell, including MS Contin and the like.     I reviewed her imaging studies of her brain.  Her last MRI was in January of last year.  It was an MRI of the brain and MRA, and there was a conclusion of a stable appearance of the brain and cerebrovascular compared with 2015 and no remote left middle cerebral artery territory infarct.  No acute infarct and no abnormalities on the MRA.    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  There was also the suggestion of tiny collateral vessels about the right mid-cerebral artery and similar findings to previous.  No focal stenosis was identified at that time. She had cystic encephalomalacia within the left perisylvian, frontoparietal and temporal lobe consistent with prior middle cerebral artery territory infarction.  The volume loss and FLAIR signal are unchanged.  There was a stable appearance of the ex vacuo dilatation of the left lateral ventricles.    In summary, the patient could not be examined today.  We will set her up for an office visit and complete that if necessary.  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  She has cystic encephalomalacia in that area consistent with prior middle cerebral artery territory infarction.  I told her we will discuss with the physician to see if there is any contraindication from Dr. Penaloza's point of view for the use of sumatriptan and Imitrex in the nasal form.  She is already on pain medication with MS Contin and oxycodone, so we will have to get his approval.  I will write to him and consider that, and then we will see her in the office.    Sincerely,    Jonas Cervantes MD        D: 10/19/2021   T: 10/19/2021   MT: Lida    Name:     HIMANSHU AL  MRN:      [id number]        Account:      [id number]   :      2000           Service Date: 10/19/2021       Document: [id number]

## 2021-10-21 ENCOUNTER — TELEPHONE (OUTPATIENT)
Dept: ONCOLOGY | Facility: CLINIC | Age: 21
End: 2021-10-21

## 2021-10-21 ENCOUNTER — PATIENT OUTREACH (OUTPATIENT)
Dept: CARE COORDINATION | Facility: CLINIC | Age: 21
End: 2021-10-21

## 2021-10-21 ENCOUNTER — INFUSION THERAPY VISIT (OUTPATIENT)
Dept: ONCOLOGY | Facility: CLINIC | Age: 21
End: 2021-10-21
Attending: PEDIATRICS
Payer: COMMERCIAL

## 2021-10-21 ENCOUNTER — HOME INFUSION (PRE-WILLOW HOME INFUSION) (OUTPATIENT)
Dept: PHARMACY | Facility: CLINIC | Age: 21
End: 2021-10-21

## 2021-10-21 VITALS
OXYGEN SATURATION: 93 % | RESPIRATION RATE: 20 BRPM | SYSTOLIC BLOOD PRESSURE: 132 MMHG | DIASTOLIC BLOOD PRESSURE: 85 MMHG | TEMPERATURE: 98.2 F | HEART RATE: 112 BPM

## 2021-10-21 DIAGNOSIS — G81.10 SPASTIC HEMIPLEGIA, UNSPECIFIED ETIOLOGY, UNSPECIFIED LATERALITY (H): Primary | ICD-10-CM

## 2021-10-21 DIAGNOSIS — D57.00 SICKLE CELL PAIN CRISIS (H): ICD-10-CM

## 2021-10-21 PROCEDURE — 250N000011 HC RX IP 250 OP 636: Performed by: PEDIATRICS

## 2021-10-21 PROCEDURE — 96361 HYDRATE IV INFUSION ADD-ON: CPT

## 2021-10-21 PROCEDURE — 96374 THER/PROPH/DIAG INJ IV PUSH: CPT

## 2021-10-21 PROCEDURE — 96376 TX/PRO/DX INJ SAME DRUG ADON: CPT

## 2021-10-21 PROCEDURE — 250N000013 HC RX MED GY IP 250 OP 250 PS 637: Performed by: PEDIATRICS

## 2021-10-21 PROCEDURE — 258N000003 HC RX IP 258 OP 636: Performed by: PEDIATRICS

## 2021-10-21 RX ORDER — MORPHINE SULFATE 2 MG/ML
2 INJECTION, SOLUTION INTRAMUSCULAR; INTRAVENOUS
Status: CANCELLED
Start: 2022-01-01

## 2021-10-21 RX ORDER — ONDANSETRON 8 MG/1
8 TABLET, FILM COATED ORAL
Status: CANCELLED
Start: 2022-01-01

## 2021-10-21 RX ORDER — DIPHENHYDRAMINE HCL 25 MG
25 CAPSULE ORAL
Status: COMPLETED | OUTPATIENT
Start: 2021-10-21 | End: 2021-10-21

## 2021-10-21 RX ORDER — HEPARIN SODIUM (PORCINE) LOCK FLUSH IV SOLN 100 UNIT/ML 100 UNIT/ML
5 SOLUTION INTRAVENOUS
Status: DISCONTINUED | OUTPATIENT
Start: 2021-10-21 | End: 2021-10-21 | Stop reason: HOSPADM

## 2021-10-21 RX ORDER — HEPARIN SODIUM,PORCINE 10 UNIT/ML
5 VIAL (ML) INTRAVENOUS
Status: CANCELLED | OUTPATIENT
Start: 2022-01-01

## 2021-10-21 RX ORDER — DIPHENHYDRAMINE HCL 25 MG
25 CAPSULE ORAL
Status: CANCELLED
Start: 2022-01-01

## 2021-10-21 RX ORDER — HEPARIN SODIUM (PORCINE) LOCK FLUSH IV SOLN 100 UNIT/ML 100 UNIT/ML
5 SOLUTION INTRAVENOUS
Status: CANCELLED | OUTPATIENT
Start: 2022-01-01

## 2021-10-21 RX ORDER — MORPHINE SULFATE 2 MG/ML
2 INJECTION, SOLUTION INTRAMUSCULAR; INTRAVENOUS
Status: DISCONTINUED | OUTPATIENT
Start: 2021-10-21 | End: 2021-10-21 | Stop reason: HOSPADM

## 2021-10-21 RX ADMIN — DEXTROSE AND SODIUM CHLORIDE 500 ML: 5; 450 INJECTION, SOLUTION INTRAVENOUS at 09:12

## 2021-10-21 RX ADMIN — Medication 5 ML: at 11:55

## 2021-10-21 RX ADMIN — MORPHINE SULFATE 2 MG: 2 INJECTION, SOLUTION INTRAMUSCULAR; INTRAVENOUS at 10:14

## 2021-10-21 RX ADMIN — MORPHINE SULFATE 2 MG: 2 INJECTION, SOLUTION INTRAMUSCULAR; INTRAVENOUS at 11:19

## 2021-10-21 RX ADMIN — DIPHENHYDRAMINE HYDROCHLORIDE 25 MG: 25 CAPSULE ORAL at 09:14

## 2021-10-21 RX ADMIN — MORPHINE SULFATE 2 MG: 2 INJECTION, SOLUTION INTRAMUSCULAR; INTRAVENOUS at 09:14

## 2021-10-21 ASSESSMENT — PAIN SCALES - GENERAL: PAINLEVEL: EXTREME PAIN (9)

## 2021-10-21 NOTE — PROGRESS NOTES
Infusion Nursing Note:  Jennifer Cervantes presents today for IVF/pain medications.    Patient seen by provider today: No    Note: Pt presented to clinic with c/o pain in lower back rated 9/10.  Her goal for pain control today is 5/10 which was reached at time of discharge from clinic.   Other interventions included: heat    Intravenous Access:  Implanted Port.    Treatment Conditions:  Not Applicable.    Post Infusion Assessment:  Patient tolerated infusion without incident.  Blood return noted pre and post infusion.  Site patent and intact, free from redness, edema or discomfort.  No evidence of extravasations.  Access discontinued per protocol.    Discharge Plan:   Patient declined prescription refills.  Discharge instructions reviewed with: Patient.  Patient and/or family verbalized understanding of discharge instructions and all questions answered.  Copy of AVS reviewed with patient and/or family.  Patient will return as needed for next appointment, has ANDREAS appt 11/1.  Patient discharged in stable condition accompanied by: self.  Departure Mode: Ambulatory.    Lupe Bolton RN

## 2021-10-21 NOTE — TELEPHONE ENCOUNTER
[facility] Telephone Triage Note    The following symptoms were reported:   Typical  Description:            Onset:  Yesterday around noon  Location: lower back and hips  Character: sharp           Intensity: 9/10    Accompanying Signs & Symptoms:  none    Chest Pain:  denies     Shortness of Breath:  denies     Fever:  denies     Chills:  denies   Cough/sore throat:  denies  Other:  Denies other symptoms    Therapies Tried and outcome: Last Oxycodone 15mg and Morphine ER taken at 5:30am.     Improved by: heat pads and showers with minimal improvement.     Needs a ride 60minutes    The following provider was consulted:  7:12am Paged Dr. Duncan  7:21am approved for IVF/Pain.     The following advice/orders were given, and/or interventions recommended:    Appt available this morning at 9:00am.     8:03am paged  Abdullahi for transportation needs    Scheduling notified  Patient instructions and/or follow up:    Called and relayed information to Pt, who verbalized understanding and will be ready as soon as transportation arranged/arrives.

## 2021-10-21 NOTE — PATIENT INSTRUCTIONS
Contact Numbers    [facility]: [phone]    Call with chills and/or temperature greater than or equal to 100.5, uncontrolled nausea/vomiting, diarrhea, constipation, dizziness, shortness of breath, chest pain, bleeding, unexplained bruising, or any new/concerning symptoms, questions/concerns.     If you are having any concerning symptoms or wish to speak to a provider before your next infusion visit, please call your care coordinator or triage to notify them so we can adequately serve you.       After Hours: [phone]    If after hours, weekends, or holidays, call main hospital  and ask for Oncology doctor on call.       October 2021 Sunday Monday Tuesday Wednesday Thursday Friday Saturday                            1    ONC INFUSION 2 HR (120 MIN)   1:00 PM   (120 min.)    ONC INFUSION NURSE   [facility] 2       3     4    ONC INFUSION 2 HR (120 MIN)  11:00 AM   (120 min.)   UC ONC INFUSION NURSE   [facility] 5     6    ONC INFUSION 2 HR (120 MIN)   9:00 AM   (120 min.)   UC ONC INFUSION NURSE   [facility] 7    BMT INFUSION 2 HR (120 MIN)   1:00 PM   (120 min.)    BMT INFUSION NURSE   [facility] Blood and Marrow Transplant [facility] [place] 8    INFUSION 2.5 HR (150 MIN)   9:30 AM   (150 min.)   UR CH 04   [facility] Infusion Services [facility] 9       10     11    ONC INFUSION 2 HR (120 MIN)   8:30 AM   (120 min.)   UC ONC INFUSION NURSE   [facility] 12    ONC INFUSION 2 HR (120 MIN)  11:00 AM   (120 min.)   UC ONC INFUSION NURSE   [facility] 13    RETURN  11:15 AM   (60 min.)   Katherine Pierce MD   [facility]    ONC INFUSION 2 HR (120 MIN)   1:00 PM   (120 min.)   UC ONC INFUSION NURSE   [facility] 14     15    ONC INFUSION 2 HR (120 MIN)  10:00 AM   (120 min.)     ONC INFUSION NURSE   [facility] 16       17     18    LAB CENTRAL  11:15 AM   (15 min.)   [facility]   [facility]    ONC INFUSION 2 HR (120 MIN)  12:00 PM   (120 min.)    ONC INFUSION NURSE   [facility]    TELEPHONE VISIT RETURN  12:00 PM   (30 min.)   Eric Duncan MD   [facility] 19    VIDEO VISIT NEW   8:45 AM   (60 min.)   Jonas Cervantes MD   [facility] Neurology [facility]    ONC INFUSION 2 HR (120 MIN)  12:00 PM   (120 min.)    ONC INFUSION NURSE   [facility]    LAB CENTRAL  12:45 PM   (15 min.)   [facility]   [facility] 20     21    ONC INFUSION 2 HR (120 MIN)   9:00 AM   (120 min.)    ONC INFUSION NURSE   [facility] 22     23       24     25     26     27     28     29     30       31                                               November 2021 Sunday Monday Tuesday Wednesday Thursday Friday Saturday        1    VIDEO VISIT RETURN   9:00 AM   (60 min.)   Andrei Machdao PA   [facility] 2     3     4     5     6       7     8     9     10     11     12     13       14     15     16     17     18     19     20       21     22    HEARING EVALUATION   8:45 AM   (60 min.)   Isatu Wright AuD   [facility] Audiology [place] 23     24    [facility] NURSE VISIT   9:00 AM   (30 min.)   Nurse, [facility] Internal Medicine [place] 25     26     27       28     29     30                                         Lab Results:  No results found for this or any previous visit (from the past 12 hour(s)).

## 2021-10-21 NOTE — PROGRESS NOTES
Social Work Follow-Up  [facility]    Data/Intervention:  Patient Name:  Jennifer Cervantes  /Age:  2000 (21 year old)    Reason for Follow-Up:  Transportation    Collaborated With:    -[facility]  -[employer]  -Patient    Intervention/Education/Resources Provided:  Patient has an appointment at 9 am and is needing assistance with transportation. SW called patient's insurance but they are not able to accommodate due to timing. SW used taxi voucher and arranged ride with [employer]. SW called patient and informed them of their ride information and patient verbalized understanding.      Assessment/Plan:  SW will continue to remain available as needed.  Previously provided patient/family with writer's contact information and availability.       CARLOS Chavez,LGSW  Hematology/Oncology Social Worker  Phone:[phone] Pager: [phone]

## 2021-10-21 NOTE — PROGRESS NOTES
This is a recent snapshot of the patient's [facility] medical record.  For current drug dose and complete information and questions, call [phone] or In Basket pool, fv home infusion ([id number])  CSN Number:  [id number]

## 2021-10-22 ENCOUNTER — TELEPHONE (OUTPATIENT)
Dept: NEUROLOGY | Facility: CLINIC | Age: 21
End: 2021-10-22

## 2021-10-22 ENCOUNTER — INFUSION THERAPY VISIT (OUTPATIENT)
Dept: ONCOLOGY | Facility: CLINIC | Age: 21
End: 2021-10-22
Attending: PEDIATRICS
Payer: COMMERCIAL

## 2021-10-22 ENCOUNTER — PATIENT OUTREACH (OUTPATIENT)
Dept: CARE COORDINATION | Facility: CLINIC | Age: 21
End: 2021-10-22

## 2021-10-22 ENCOUNTER — TELEPHONE (OUTPATIENT)
Dept: ONCOLOGY | Facility: CLINIC | Age: 21
End: 2021-10-22

## 2021-10-22 ENCOUNTER — HOME INFUSION (PRE-WILLOW HOME INFUSION) (OUTPATIENT)
Dept: PHARMACY | Facility: CLINIC | Age: 21
End: 2021-10-22

## 2021-10-22 VITALS
RESPIRATION RATE: 18 BRPM | OXYGEN SATURATION: 95 % | SYSTOLIC BLOOD PRESSURE: 131 MMHG | TEMPERATURE: 97.9 F | DIASTOLIC BLOOD PRESSURE: 83 MMHG | HEART RATE: 100 BPM

## 2021-10-22 DIAGNOSIS — G81.10 SPASTIC HEMIPLEGIA, UNSPECIFIED ETIOLOGY, UNSPECIFIED LATERALITY (H): Primary | ICD-10-CM

## 2021-10-22 DIAGNOSIS — G43.909 MIGRAINE WITHOUT STATUS MIGRAINOSUS, NOT INTRACTABLE, UNSPECIFIED MIGRAINE TYPE: Primary | ICD-10-CM

## 2021-10-22 DIAGNOSIS — D57.00 SICKLE CELL PAIN CRISIS (H): ICD-10-CM

## 2021-10-22 PROCEDURE — 96374 THER/PROPH/DIAG INJ IV PUSH: CPT

## 2021-10-22 PROCEDURE — 258N000003 HC RX IP 258 OP 636: Performed by: PEDIATRICS

## 2021-10-22 PROCEDURE — 250N000011 HC RX IP 250 OP 636: Performed by: PEDIATRICS

## 2021-10-22 PROCEDURE — 96361 HYDRATE IV INFUSION ADD-ON: CPT

## 2021-10-22 RX ORDER — MORPHINE SULFATE 2 MG/ML
2 INJECTION, SOLUTION INTRAMUSCULAR; INTRAVENOUS
Status: CANCELLED
Start: 2022-01-01

## 2021-10-22 RX ORDER — DIPHENHYDRAMINE HCL 25 MG
25 CAPSULE ORAL
Status: DISCONTINUED | OUTPATIENT
Start: 2021-10-22 | End: 2021-10-22 | Stop reason: HOSPADM

## 2021-10-22 RX ORDER — ONDANSETRON 8 MG/1
8 TABLET, FILM COATED ORAL
Status: CANCELLED
Start: 2022-01-01

## 2021-10-22 RX ORDER — HEPARIN SODIUM,PORCINE 10 UNIT/ML
5 VIAL (ML) INTRAVENOUS
Status: CANCELLED | OUTPATIENT
Start: 2022-01-01

## 2021-10-22 RX ORDER — HEPARIN SODIUM (PORCINE) LOCK FLUSH IV SOLN 100 UNIT/ML 100 UNIT/ML
5 SOLUTION INTRAVENOUS
Status: DISCONTINUED | OUTPATIENT
Start: 2021-10-22 | End: 2021-10-22 | Stop reason: HOSPADM

## 2021-10-22 RX ORDER — DIPHENHYDRAMINE HCL 25 MG
25 CAPSULE ORAL
Status: CANCELLED
Start: 2022-01-01

## 2021-10-22 RX ORDER — HEPARIN SODIUM (PORCINE) LOCK FLUSH IV SOLN 100 UNIT/ML 100 UNIT/ML
5 SOLUTION INTRAVENOUS
Status: CANCELLED | OUTPATIENT
Start: 2022-01-01

## 2021-10-22 RX ORDER — MORPHINE SULFATE 2 MG/ML
2 INJECTION, SOLUTION INTRAMUSCULAR; INTRAVENOUS
Status: COMPLETED | OUTPATIENT
Start: 2021-10-22 | End: 2021-10-22

## 2021-10-22 RX ADMIN — MORPHINE SULFATE 2 MG: 2 INJECTION, SOLUTION INTRAMUSCULAR; INTRAVENOUS at 11:11

## 2021-10-22 RX ADMIN — DEXTROSE AND SODIUM CHLORIDE 500 ML: 5; 450 INJECTION, SOLUTION INTRAVENOUS at 10:12

## 2021-10-22 RX ADMIN — MORPHINE SULFATE 2 MG: 2 INJECTION, SOLUTION INTRAMUSCULAR; INTRAVENOUS at 10:13

## 2021-10-22 RX ADMIN — MORPHINE SULFATE 2 MG: 2 INJECTION, SOLUTION INTRAMUSCULAR; INTRAVENOUS at 12:11

## 2021-10-22 NOTE — PATIENT INSTRUCTIONS
[facility] Triage and after hours / weekends / holidays:  [phone]    Please call the triage or after hours line if you experience a temperature greater than or equal to 100.5, shaking chills, have uncontrolled nausea, vomiting and/or diarrhea, dizziness, shortness of breath, chest pain, bleeding, unexplained bruising, or if you have any other new/concerning symptoms, questions or concerns.      If you are having any concerning symptoms or wish to speak to a provider before your next infusion visit, please call your care coordinator or triage to notify them so we can adequately serve you.     If you need a refill on a narcotic prescription or other medication, please call before your infusion appointment.       October 2021 Sunday Monday Tuesday Wednesday Thursday Friday Saturday                            1    ONC INFUSION 2 HR (120 MIN)   1:00 PM   (120 min.)    ONC INFUSION NURSE   [facility] 2       3     4    ONC INFUSION 2 HR (120 MIN)  11:00 AM   (120 min.)    ONC INFUSION NURSE   [facility] 5     6    ONC INFUSION 2 HR (120 MIN)   9:00 AM   (120 min.)    ONC INFUSION NURSE   [facility] 7    BMT INFUSION 2 HR (120 MIN)   1:00 PM   (120 min.)    BMT INFUSION NURSE   [facility] Blood and Marrow Transplant Program [place] 8    INFUSION 2.5 HR (150 MIN)   9:30 AM   (150 min.)   UR CH 04   [facility] Infusion Services [facility] 9       10     11    ONC INFUSION 2 HR (120 MIN)   8:30 AM   (120 min.)    ONC INFUSION NURSE   [facility] 12    ONC INFUSION 2 HR (120 MIN)  11:00 AM   (120 min.)    ONC INFUSION NURSE   [facility] 13    RETURN  11:15 AM   (60 min.)   Katherine Pierce MD   [facility]    ONC INFUSION 2 HR (120 MIN)   1:00 PM   (120 min.)    ONC INFUSION NURSE   [facility] 14     15    ONC INFUSION 2 HR (120 MIN)  10:00 AM   (120 min.)    ONC INFUSION NURSE   [facility] 16       17     18    LAB CENTRAL  11:15 AM   (15 min.)   [facility]   [facility]    ONC INFUSION 2 HR (120 MIN)  12:00 PM   (120 min.)    ONC INFUSION NURSE   [facility]    TELEPHONE VISIT RETURN  12:00 PM   (30 min.)   Eric Duncan MD   [facility] 19    VIDEO VISIT NEW   8:45 AM   (60 min.)   Jonas Cervantes MD   [facility] Neurology [facility]    ONC INFUSION 2 HR (120 MIN)  12:00 PM   (120 min.)    ONC INFUSION NURSE   [facility]    LAB CENTRAL  12:45 PM   (15 min.)   [facility]   [facility] 20     21    ONC INFUSION 2 HR (120 MIN)   9:00 AM   (120 min.)    ONC INFUSION NURSE   [facility] 22    ONC INFUSION 2 HR (120 MIN)  10:00 AM   (120 min.)    ONC INFUSION NURSE   [facility] 23       24     25     26     27     28     29     30       31                                               November 2021 Sunday Monday Tuesday Wednesday Thursday Friday Saturday        1    VIDEO VISIT RETURN   9:00 AM   (60 min.)   Andrei Machado PA   [facility] 2     3     4     5     6       7     8     9     10     11     12     13       14     15     16     17     18     19     20       21     22    HEARING EVALUATION   8:45 AM   (60 min.)   Isatu Wright AuD   [facility] Audiology [place]    MR ABDOMEN WO   4:30 PM   (45 min.)   [id number]   [facility] Imaging [place] MRI [place] 23     24    [facility] NURSE VISIT   9:00 AM   (30 min.)   Nurse, [facility] Internal Medicine [place] 25     26     27       28     29     30

## 2021-10-22 NOTE — PROGRESS NOTES
This is a recent snapshot of the patient's [facility] medical record.  For current drug dose and complete information and questions, call [phone] or In Basket pool, fv home infusion ([id number])  CSN Number:  [id number]

## 2021-10-22 NOTE — PROGRESS NOTES
Social Work Follow-Up  [facility]    Data/Intervention:  Patient Name:  Jennifer Cervantes  /Age:  2000 (21 year old)    Reason for Follow-Up:  [employer]    Collaborated With:    -[employer] Triage  -[employer] [facility]  -Patient    Intervention/Education/Resources Provided:  Patient has an appointment this morning at 10 am and is needing ride assistance. SW called patient's insurance and ride was arranged through [employer] ([phone]). SW called patient and informed them of their ride detail and encouraged patient to reach back out if there are any issues with their ride. Patient verbalized understanding.    Assessment/Plan:  SW will remain available as needed.  Previously provided patient/family with writer's contact information and availability.       CARLOS Chavez,LGSW  Hematology/Oncology Social Worker  Phone:[phone] Pager: [phone]

## 2021-10-22 NOTE — PROGRESS NOTES
Infusion Nursing Note:  Jennifer Cervantes presents today for IVF and pain medication.    Patient seen by provider today: No    Note: Pt presented to clinic with c/o pain in lower back rated 9/10.  Her goal for pain control today is 5/10 which was not reached at time of discharge from clinic.  Pt's pain was 6/10, however, pt said this is tolerable for discharge and is ready to go home.   Other interventions included: heat    Intravenous Access:  Implanted Port.    Treatment Conditions:  Not Applicable.    Post Infusion Assessment:  Patient tolerated infusion without incident.  Blood return noted pre and post infusion.  Site patent and intact, free from redness, edema or discomfort.  No evidence of extravasations.  Access discontinued per protocol.    Discharge Plan:   Patient declined prescription refills.  Discharge instructions reviewed with: Patient.  Patient and/or family verbalized understanding of discharge instructions and all questions answered.  AVS to patient via [employer].  Patient will return 11/2/2021 for next appointment with ANDREAS.   Patient discharged in stable condition accompanied by: self.  Departure Mode: Ambulatory.    Lupe Bolton RN

## 2021-10-22 NOTE — TELEPHONE ENCOUNTER
----- Message from Ashley Anderson MD sent at 10/19/2021  2:40 PM CDT -----  Regarding: RE: SS and migraine  Hello,    I haven't reviewed the patient's chart, but in general, for acute treatment of migraine attacks in patients post-stroke, I consider:  -naproxen 500 mg  -metoclopramide 10 mg + diphenhydramine 25 mg  -intranasal lidocaine  -Cefaly anti-migraine device  -Reyvow    Happy to see the patient if needed too.    Ashley Anderson MD    ----- Message -----  From: Jonas Cervantes MD  Sent: 10/19/2021   1:17 PM CDT  To: Eric Duncan MD, #  Subject: RE: SS and migraine                              I was afraid of that. Let me ask headache expert. Thanks charles  ----- Message -----  From: Eric Duncan MD  Sent: 10/19/2021  12:02 PM CDT  To: Jonas Cervantes MD  Subject: RE: SS and migraine                              We avoid Imitrex due to the vasoconstrictive nature of it, even more so since she specifically has had a stroke in the past. Any other options (I realize she may be quite limited)?    David  ----- Message -----  From: Jonas Cervantes MD  Sent: 10/19/2021  11:01 AM CDT  To: Eric Duncan MD  Subject: SS and migraine                                  Hi  Saw her fo rmigraine like headaches. We can try intranasal imitrex if no contraindication form the sickling tendency>  Thanks  Charles

## 2021-10-22 NOTE — TELEPHONE ENCOUNTER
[facility] Telephone Triage Note    The following symptoms were reported:   Typical  Description:            Onset:  This morning  Location: lower back  Character: Sharp           Intensity: 9/10    Accompanying Signs & Symptoms:  none    Chest Pain:  denies     Shortness of Breath:  denies     Fever:  denies     Chills:  denies   Cough/sore throat:  denies  Other:  Denies other concerns.     Needs transportation - 60min    Therapies Tried and outcome: Last oxycodone taken at 5:30am.Has enough meds for weekend.     Improved by: minimal improvement with oxy, requiring infusions.     The following provider was consulted:  Dr. Duncan approved by care team 7:15am.     The following advice/orders were given, and/or interventions recommended:  Pending appt availability    Appt available at 10:00am.     Scheduling notified    7:55am Abdullahi FRYE assisting with transportation needs.         Patient instructions and/or follow up:    Called and relayed information to Pt, who verbalized understanding and will wait for call for  on transportation details for 10:00am Infusion appt.

## 2021-10-24 ENCOUNTER — HOME INFUSION (PRE-WILLOW HOME INFUSION) (OUTPATIENT)
Dept: PHARMACY | Facility: CLINIC | Age: 21
End: 2021-10-24

## 2021-10-25 ENCOUNTER — TELEPHONE (OUTPATIENT)
Dept: ONCOLOGY | Facility: CLINIC | Age: 21
End: 2021-10-25

## 2021-10-25 ENCOUNTER — PATIENT OUTREACH (OUTPATIENT)
Dept: CARE COORDINATION | Facility: CLINIC | Age: 21
End: 2021-10-25

## 2021-10-25 ENCOUNTER — INFUSION THERAPY VISIT (OUTPATIENT)
Dept: ONCOLOGY | Facility: CLINIC | Age: 21
End: 2021-10-25
Attending: INTERNAL MEDICINE
Payer: COMMERCIAL

## 2021-10-25 VITALS
DIASTOLIC BLOOD PRESSURE: 83 MMHG | SYSTOLIC BLOOD PRESSURE: 139 MMHG | OXYGEN SATURATION: 93 % | TEMPERATURE: 98.2 F | HEART RATE: 99 BPM | RESPIRATION RATE: 16 BRPM

## 2021-10-25 DIAGNOSIS — G81.10 SPASTIC HEMIPLEGIA, UNSPECIFIED ETIOLOGY, UNSPECIFIED LATERALITY (H): Primary | ICD-10-CM

## 2021-10-25 DIAGNOSIS — D57.00 SICKLE CELL PAIN CRISIS (H): ICD-10-CM

## 2021-10-25 PROCEDURE — 96374 THER/PROPH/DIAG INJ IV PUSH: CPT

## 2021-10-25 PROCEDURE — 250N000011 HC RX IP 250 OP 636: Performed by: PEDIATRICS

## 2021-10-25 PROCEDURE — 96361 HYDRATE IV INFUSION ADD-ON: CPT

## 2021-10-25 PROCEDURE — 96376 TX/PRO/DX INJ SAME DRUG ADON: CPT

## 2021-10-25 PROCEDURE — 258N000003 HC RX IP 258 OP 636: Performed by: PEDIATRICS

## 2021-10-25 RX ORDER — ONDANSETRON 8 MG/1
8 TABLET, ORALLY DISINTEGRATING ORAL
Status: DISCONTINUED | OUTPATIENT
Start: 2021-10-25 | End: 2021-10-25 | Stop reason: HOSPADM

## 2021-10-25 RX ORDER — MORPHINE SULFATE 2 MG/ML
2 INJECTION, SOLUTION INTRAMUSCULAR; INTRAVENOUS
Status: CANCELLED
Start: 2022-01-01

## 2021-10-25 RX ORDER — HEPARIN SODIUM,PORCINE 10 UNIT/ML
5 VIAL (ML) INTRAVENOUS
Status: CANCELLED | OUTPATIENT
Start: 2022-01-01

## 2021-10-25 RX ORDER — MORPHINE SULFATE 2 MG/ML
2 INJECTION, SOLUTION INTRAMUSCULAR; INTRAVENOUS
Status: COMPLETED | OUTPATIENT
Start: 2021-10-25 | End: 2021-10-25

## 2021-10-25 RX ORDER — HEPARIN SODIUM (PORCINE) LOCK FLUSH IV SOLN 100 UNIT/ML 100 UNIT/ML
5 SOLUTION INTRAVENOUS
Status: DISCONTINUED | OUTPATIENT
Start: 2021-10-25 | End: 2021-10-25 | Stop reason: HOSPADM

## 2021-10-25 RX ORDER — DIPHENHYDRAMINE HCL 25 MG
25 CAPSULE ORAL
Status: DISCONTINUED | OUTPATIENT
Start: 2021-10-25 | End: 2021-10-25 | Stop reason: HOSPADM

## 2021-10-25 RX ORDER — HEPARIN SODIUM (PORCINE) LOCK FLUSH IV SOLN 100 UNIT/ML 100 UNIT/ML
5 SOLUTION INTRAVENOUS
Status: CANCELLED | OUTPATIENT
Start: 2022-01-01

## 2021-10-25 RX ORDER — ONDANSETRON 8 MG/1
8 TABLET, FILM COATED ORAL
Status: CANCELLED
Start: 2022-01-01

## 2021-10-25 RX ORDER — DIPHENHYDRAMINE HCL 25 MG
25 CAPSULE ORAL
Status: CANCELLED
Start: 2022-01-01

## 2021-10-25 RX ADMIN — DEXTROSE AND SODIUM CHLORIDE 500 ML: 5; 450 INJECTION, SOLUTION INTRAVENOUS at 13:09

## 2021-10-25 RX ADMIN — MORPHINE SULFATE 2 MG: 2 INJECTION, SOLUTION INTRAMUSCULAR; INTRAVENOUS at 14:07

## 2021-10-25 RX ADMIN — MORPHINE SULFATE 2 MG: 2 INJECTION, SOLUTION INTRAMUSCULAR; INTRAVENOUS at 13:10

## 2021-10-25 RX ADMIN — Medication 5 ML: at 15:22

## 2021-10-25 RX ADMIN — MORPHINE SULFATE 2 MG: 2 INJECTION, SOLUTION INTRAMUSCULAR; INTRAVENOUS at 15:18

## 2021-10-25 ASSESSMENT — PAIN SCALES - GENERAL
PAINLEVEL: MODERATE PAIN (5)
PAINLEVEL: EXTREME PAIN (9)

## 2021-10-25 NOTE — TELEPHONE ENCOUNTER
Pt called in to triage requesting IVF pain meds for all over but mostly in lower back pain rated 9/10 x since midnight and has not been able to sleep. Pain quality is sharp.  This is usual sickle cell pain.   Stated last took prn MS contin, oxycodone, tylenol and muscle relaxant this morning at 5:30 without relief.   Denied any fevers, chills, cough, sob, chest pain or other symptoms.   Pt's last infusion was 10/22, last clinic visit 10/18/21 with follow-up on 11/1/21 with Andrei Machado PAC.   Pt does not qualify for sickle cell standing order protocol due to infusion on 10/22. Paged Dr. Duncan at [phone]  Pt denied being out of home medications and needing any refills today.   Transportation: Does need ride. Pt is 15 minutes away.  Added to Wait list.  [facility] can take pt at 1300.  SW called and will arrange ride.  IB sent to scheduling.  Pt confirmed she can come to apt.     Routed to provider and care team.

## 2021-10-25 NOTE — PROGRESS NOTES
Social Work Follow-Up  [facility]    Data/Intervention:  Patient Name:  Jennifer Cervantes  /Age:  2000 (21 year old)    Reason for Follow-Up:  Transportation    Collaborated With:    -Triage  -[facility]  -Patient    Intervention/Education/Resources Provided:  Patient has an appointment at 1 pm today and is needing assistance with transportation. SW called patient's insurance and arranged ride with [employer]. SW called patient and informed them of their ride detail and patient verbalized understanding.    Assessment/Plan:  SW will remain available as needed.  Previously provided patient/family with writer's contact information and availability.       CARLOS Chavez,NATALIA  Hematology/Oncology Social Worker  Phone:[phone] Pager: [phone]

## 2021-10-25 NOTE — PROGRESS NOTES
This is a recent snapshot of the patient's [facility] medical record.  For current drug dose and complete information and questions, call [phone] or In Basket pool, fv home infusion ([id number])  CSN Number:  [id number]

## 2021-10-25 NOTE — PROGRESS NOTES
"Infusion Nursing Note:  Jennifer Cervantes presents today for IV fluids/pain meds.    Patient seen by provider today: No   present during visit today: Not Applicable.    Note: Add on per triage for IVF/pain meds, no labs needed today. Patient reports 9/10 pain \"all over today, especially low back\".  Morphine IV given x3. States pain goal 5/10, feels ok to discharge home.       Intravenous Access:  Implanted Port.    Treatment Conditions:  Not Applicable.      Post Infusion Assessment:  Patient tolerated infusion without incident.  Blood return noted pre and post infusion.  Access discontinued per protocol.       Discharge Plan:   Patient declined prescription refills.  AVS to patient via [employer].  Patient will return 11/1 for next provider appointment.   Patient discharged in stable condition accompanied by: self.  Departure Mode: Ambulatory.      Henny Suresh RN                    "

## 2021-10-26 ENCOUNTER — INFUSION THERAPY VISIT (OUTPATIENT)
Dept: TRANSPLANT | Facility: CLINIC | Age: 21
End: 2021-10-26
Attending: PHYSICIAN ASSISTANT
Payer: COMMERCIAL

## 2021-10-26 ENCOUNTER — PATIENT OUTREACH (OUTPATIENT)
Dept: CARE COORDINATION | Facility: CLINIC | Age: 21
End: 2021-10-26

## 2021-10-26 ENCOUNTER — TELEPHONE (OUTPATIENT)
Dept: ONCOLOGY | Facility: CLINIC | Age: 21
End: 2021-10-26

## 2021-10-26 VITALS
OXYGEN SATURATION: 92 % | RESPIRATION RATE: 14 BRPM | HEART RATE: 121 BPM | TEMPERATURE: 99 F | DIASTOLIC BLOOD PRESSURE: 86 MMHG | SYSTOLIC BLOOD PRESSURE: 132 MMHG

## 2021-10-26 DIAGNOSIS — G81.10 SPASTIC HEMIPLEGIA, UNSPECIFIED ETIOLOGY, UNSPECIFIED LATERALITY (H): Primary | ICD-10-CM

## 2021-10-26 DIAGNOSIS — D57.00 SICKLE CELL PAIN CRISIS (H): ICD-10-CM

## 2021-10-26 PROCEDURE — 250N000011 HC RX IP 250 OP 636: Performed by: PEDIATRICS

## 2021-10-26 PROCEDURE — 96376 TX/PRO/DX INJ SAME DRUG ADON: CPT

## 2021-10-26 PROCEDURE — 96374 THER/PROPH/DIAG INJ IV PUSH: CPT

## 2021-10-26 PROCEDURE — 96361 HYDRATE IV INFUSION ADD-ON: CPT

## 2021-10-26 PROCEDURE — 258N000003 HC RX IP 258 OP 636: Performed by: PEDIATRICS

## 2021-10-26 RX ORDER — ONDANSETRON 8 MG/1
8 TABLET, FILM COATED ORAL
Status: CANCELLED
Start: 2022-01-01

## 2021-10-26 RX ORDER — MORPHINE SULFATE 2 MG/ML
2 INJECTION, SOLUTION INTRAMUSCULAR; INTRAVENOUS
Status: COMPLETED | OUTPATIENT
Start: 2021-10-26 | End: 2021-10-26

## 2021-10-26 RX ORDER — HEPARIN SODIUM,PORCINE 10 UNIT/ML
5 VIAL (ML) INTRAVENOUS
Status: CANCELLED | OUTPATIENT
Start: 2022-01-01

## 2021-10-26 RX ORDER — HEPARIN SODIUM (PORCINE) LOCK FLUSH IV SOLN 100 UNIT/ML 100 UNIT/ML
5 SOLUTION INTRAVENOUS
Status: CANCELLED | OUTPATIENT
Start: 2022-01-01

## 2021-10-26 RX ORDER — DIPHENHYDRAMINE HCL 25 MG
25 CAPSULE ORAL
Status: DISCONTINUED | OUTPATIENT
Start: 2021-10-26 | End: 2021-10-26 | Stop reason: HOSPADM

## 2021-10-26 RX ORDER — HEPARIN SODIUM (PORCINE) LOCK FLUSH IV SOLN 100 UNIT/ML 100 UNIT/ML
5 SOLUTION INTRAVENOUS
Status: DISCONTINUED | OUTPATIENT
Start: 2021-10-26 | End: 2021-10-26 | Stop reason: HOSPADM

## 2021-10-26 RX ORDER — MORPHINE SULFATE 2 MG/ML
2 INJECTION, SOLUTION INTRAMUSCULAR; INTRAVENOUS
Status: CANCELLED
Start: 2022-01-01

## 2021-10-26 RX ORDER — HEPARIN SODIUM,PORCINE 10 UNIT/ML
5 VIAL (ML) INTRAVENOUS
Status: DISCONTINUED | OUTPATIENT
Start: 2021-10-26 | End: 2021-10-26 | Stop reason: HOSPADM

## 2021-10-26 RX ORDER — ONDANSETRON 8 MG/1
8 TABLET, ORALLY DISINTEGRATING ORAL
Status: DISCONTINUED | OUTPATIENT
Start: 2021-10-26 | End: 2021-10-26 | Stop reason: HOSPADM

## 2021-10-26 RX ORDER — DIPHENHYDRAMINE HCL 25 MG
25 CAPSULE ORAL
Status: CANCELLED
Start: 2022-01-01

## 2021-10-26 RX ADMIN — DEXTROSE AND SODIUM CHLORIDE 500 ML: 5; 450 INJECTION, SOLUTION INTRAVENOUS at 11:58

## 2021-10-26 RX ADMIN — MORPHINE SULFATE 2 MG: 2 INJECTION, SOLUTION INTRAMUSCULAR; INTRAVENOUS at 13:51

## 2021-10-26 RX ADMIN — SODIUM CHLORIDE, PRESERVATIVE FREE 5 ML: 5 INJECTION INTRAVENOUS at 14:12

## 2021-10-26 RX ADMIN — MORPHINE SULFATE 2 MG: 2 INJECTION, SOLUTION INTRAMUSCULAR; INTRAVENOUS at 12:56

## 2021-10-26 RX ADMIN — MORPHINE SULFATE 2 MG: 2 INJECTION, SOLUTION INTRAMUSCULAR; INTRAVENOUS at 11:58

## 2021-10-26 ASSESSMENT — PAIN SCALES - GENERAL: PAINLEVEL: EXTREME PAIN (9)

## 2021-10-26 NOTE — PROGRESS NOTES
Social Work Follow-Up  [facility]    Data/Intervention:  Patient Name:  Jennifer Cervantes  /Age:  2000 (21 year old)    Reason for Follow-Up:  Transportation    Collaborated With:    -Triage  -[facility]  -Patient    Intervention/Education/Resources Provided:  Patient has an appointment at 12 pm today and is needing assistance with Transportation. SW called patient's insurance and ride was arranged with [employer]. SW called patient and provided them with their ride detail and patient verbalized understanding. SW encouraged patient to call back if there are any issues with their ride.    Assessment/Plan:  SW will remain available as needed.  Previously provided patient/family with writer's contact information and availability.       CARLSO Chavez,LGSW  Hematology/Oncology Social Worker  Phone:[phone] Pager: [phone]

## 2021-10-26 NOTE — PROGRESS NOTES
"Infusion Nursing Note:  Jennifer Cervantes presents today for add-on infusion.    Patient seen by provider today: No   present during visit today: Not Applicable.    Note: Patient add-on infusion for IVF/pain meds. Patient reports \"all over\" pain rated 9/10, which began last night. Took PO Tylenol, MS Contin, and oxycodone at 0600 with minimal pain relief. Denies chest pain, SOB, swelling, N/V, or fever. ONC toxicity assessment completed.    Received IVF per order and Morphine 2mg IV every hour x 3 doses. Patient reports pain improvement, rating 5/10 following IV Morphine.       Intravenous Access:  Implanted Port.    Treatment Conditions:  Not Applicable.      Post Infusion Assessment:  Patient tolerated infusion without incident.       Discharge Plan:   Patient discharged in stable condition accompanied by: self.  Departure Mode: Ambulatory.      Jennifer Lai RN                      "

## 2021-10-26 NOTE — TELEPHONE ENCOUNTER
Pt called in to triage requesting IVF pain meds for lower back and hip pain rated 9/10 x since late last night days. Pain quality is sharp.  This is usual sickle cell pain.     Stated last took prn ms contin, oxycodone, and tylenol 0600 this morning without relief.     Denied any fevers, chills, cough, sob, chest pain or other symptoms.     Pt's last infusion was 10/25, last clinic visit 10/18/21 with follow-up on 11/1/21 with Andrei Machado ANDREAS.    Pt qualifies per order in Therapy Plan that  Pt can scheduled three consecutive days without contacting Dr. Duncan, but still needs to call triage to determine whether any infusion unit has availability.    Pt denied being out of home medications and needing any refills today.     Transportation: Does need ride. Pt is 15 minutes away.    Recommend ED if pt is unable to manage pain at home while waiting for an apt in infusion.     Per Charge Nurse, can take pt at 1200 in BMT.  SW willl arrange transport.  IB sent to scheduling.  Infusion updated.    Routed to Dr. Duncan and RNCC.

## 2021-10-26 NOTE — LETTER
"    10/26/2021         RE: Jennifer Cervantes  [address]  [place] [address]        Dear Colleague,    Thank you for referring your patient, Jennifer Cervantes, to the [facility] [place]. Please see a copy of my visit note below.    Infusion Nursing Note:  Jennifer Cervantes presents today for add-on infusion.    Patient seen by provider today: No   present during visit today: Not Applicable.    Note: Patient add-on infusion for IVF/pain meds. Patient reports \"all over\" pain rated 9/10, which began last night. Took PO Tylenol, MS Contin, and oxycodone at 0600 with minimal pain relief. Denies chest pain, SOB, swelling, N/V, or fever. ONC toxicity assessment completed.    Received IVF per order and Morphine 2mg IV every hour x 3 doses. Patient reports pain improvement, rating 5/10 following IV Morphine.       Intravenous Access:  Implanted Port.    Treatment Conditions:  Not Applicable.      Post Infusion Assessment:  Patient tolerated infusion without incident.       Discharge Plan:   Patient discharged in stable condition accompanied by: self.  Departure Mode: Ambulatory.      Jennifer Lai RN                          Again, thank you for allowing me to participate in the care of your patient.        Sincerely,        [facility]    "

## 2021-10-27 ENCOUNTER — PATIENT OUTREACH (OUTPATIENT)
Dept: CARE COORDINATION | Facility: CLINIC | Age: 21
End: 2021-10-27

## 2021-10-27 ENCOUNTER — INFUSION THERAPY VISIT (OUTPATIENT)
Dept: INFUSION THERAPY | Facility: CLINIC | Age: 21
End: 2021-10-27
Attending: PEDIATRICS
Payer: COMMERCIAL

## 2021-10-27 ENCOUNTER — TELEPHONE (OUTPATIENT)
Dept: ONCOLOGY | Facility: CLINIC | Age: 21
End: 2021-10-27

## 2021-10-27 VITALS
SYSTOLIC BLOOD PRESSURE: 141 MMHG | TEMPERATURE: 98.7 F | OXYGEN SATURATION: 91 % | RESPIRATION RATE: 16 BRPM | DIASTOLIC BLOOD PRESSURE: 88 MMHG | HEART RATE: 106 BPM

## 2021-10-27 DIAGNOSIS — G81.10 SPASTIC HEMIPLEGIA, UNSPECIFIED ETIOLOGY, UNSPECIFIED LATERALITY (H): Primary | ICD-10-CM

## 2021-10-27 DIAGNOSIS — D57.00 SICKLE CELL PAIN CRISIS (H): ICD-10-CM

## 2021-10-27 PROCEDURE — 96375 TX/PRO/DX INJ NEW DRUG ADDON: CPT

## 2021-10-27 PROCEDURE — 96376 TX/PRO/DX INJ SAME DRUG ADON: CPT

## 2021-10-27 PROCEDURE — 96366 THER/PROPH/DIAG IV INF ADDON: CPT

## 2021-10-27 PROCEDURE — 250N000011 HC RX IP 250 OP 636: Performed by: PEDIATRICS

## 2021-10-27 PROCEDURE — 96365 THER/PROPH/DIAG IV INF INIT: CPT

## 2021-10-27 PROCEDURE — 258N000003 HC RX IP 258 OP 636: Performed by: PEDIATRICS

## 2021-10-27 RX ORDER — MORPHINE SULFATE 2 MG/ML
2 INJECTION, SOLUTION INTRAMUSCULAR; INTRAVENOUS
Status: COMPLETED | OUTPATIENT
Start: 2021-10-27 | End: 2021-10-27

## 2021-10-27 RX ORDER — HEPARIN SODIUM,PORCINE 10 UNIT/ML
5 VIAL (ML) INTRAVENOUS
Status: CANCELLED | OUTPATIENT
Start: 2022-01-01

## 2021-10-27 RX ORDER — DIPHENHYDRAMINE HCL 25 MG
25 CAPSULE ORAL
Status: CANCELLED
Start: 2022-01-01

## 2021-10-27 RX ORDER — ONDANSETRON 8 MG/1
8 TABLET, FILM COATED ORAL
Status: CANCELLED
Start: 2022-01-01

## 2021-10-27 RX ORDER — MORPHINE SULFATE 2 MG/ML
2 INJECTION, SOLUTION INTRAMUSCULAR; INTRAVENOUS
Status: CANCELLED
Start: 2022-01-01

## 2021-10-27 RX ORDER — HEPARIN SODIUM (PORCINE) LOCK FLUSH IV SOLN 100 UNIT/ML 100 UNIT/ML
5 SOLUTION INTRAVENOUS
Status: CANCELLED | OUTPATIENT
Start: 2022-01-01

## 2021-10-27 RX ORDER — MORPHINE SULFATE 2 MG/ML
1 INJECTION, SOLUTION INTRAMUSCULAR; INTRAVENOUS ONCE
Status: COMPLETED | OUTPATIENT
Start: 2021-10-27 | End: 2021-10-27

## 2021-10-27 RX ORDER — HEPARIN SODIUM (PORCINE) LOCK FLUSH IV SOLN 100 UNIT/ML 100 UNIT/ML
5 SOLUTION INTRAVENOUS
Status: DISCONTINUED | OUTPATIENT
Start: 2021-10-27 | End: 2021-10-27 | Stop reason: HOSPADM

## 2021-10-27 RX ADMIN — Medication 5 ML: at 16:39

## 2021-10-27 RX ADMIN — DEXTROSE AND SODIUM CHLORIDE 500 ML: 5; 450 INJECTION, SOLUTION INTRAVENOUS at 14:18

## 2021-10-27 RX ADMIN — MORPHINE SULFATE 1 MG: 2 INJECTION, SOLUTION INTRAMUSCULAR; INTRAVENOUS at 14:51

## 2021-10-27 RX ADMIN — MORPHINE SULFATE 2 MG: 2 INJECTION, SOLUTION INTRAMUSCULAR; INTRAVENOUS at 16:22

## 2021-10-27 RX ADMIN — MORPHINE SULFATE 2 MG: 2 INJECTION, SOLUTION INTRAMUSCULAR; INTRAVENOUS at 15:24

## 2021-10-27 RX ADMIN — MORPHINE SULFATE 1 MG: 2 INJECTION, SOLUTION INTRAMUSCULAR; INTRAVENOUS at 14:18

## 2021-10-27 ASSESSMENT — PAIN SCALES - GENERAL
PAINLEVEL: SEVERE PAIN (7)
PAINLEVEL: EXTREME PAIN (9)

## 2021-10-27 NOTE — PROGRESS NOTES
Social Work Follow-Up  [facility]    Data/Intervention:  Patient Name:  Jennifer Cervantes  /Age:  2000 (21 year old)    Reason for Follow-Up:  Transportation    Collaborated With:    -[facility] [employer] [facility]  -Patient    Intervention/Education/Resources Provided:  Patient has an appointment at 2 pm and is needing assistance with transportation. SW called patient's insurance and ride was set up with [employer] ([phone]). SW called patient and informed them of their ride detail and encouraged patient to reach back out if there are any issues with their ride.    Assessment/Plan:  SW will remain available as needed.  Previously provided patient/family with writer's contact information and availability.       CARLOS Chavez,LGSW  Hematology/Oncology Social Worker  Phone:[phone] Pager: [phone]

## 2021-10-27 NOTE — TELEPHONE ENCOUNTER
[facility] has infusion appointment available at 1400. Called Jennifer who confirmed that she could be here at 1400. Called  to arrange transportation. Inbox message sent to scheduling.     Routed to Dr. Duncan and RNCC.

## 2021-10-27 NOTE — LETTER
10/27/2021         RE: Jennifer Cervantes  [address]  [place] [address]        Dear Colleague,    Thank you for referring your patient, Jennifer Cervantes, to the [facility]. Please see a copy of my visit note below.    Infusion Nursing Note:  Jennifer Cervantes presents today for IVF+Pain medication.    Patient seen by provider today: No   present during visit today: Not Applicable.    Note: D5 in NS given over 2 hours at 250 ml/h.      Intravenous Access:  Implanted Port.    Treatment Conditions:  Not Applicable.      Post Infusion Assessment:  Patient tolerated infusion without incident.  Blood return noted pre and post infusion.  Site patent and intact, free from redness, edema or discomfort.  No evidence of extravasations.  Access discontinued per protocol.       Discharge Plan:   Discharge instructions reviewed with: Patient.  Patient and/or family verbalized understanding of discharge instructions and all questions answered.  Patient discharged in stable condition accompanied by: self.  Departure Mode: Ambulatory.    Administrations This Visit     dextrose 5% and 0.45% NaCl BOLUS     Admin Date  10/27/2021 Action  New Bag Dose  500 mL Rate  250 mL/hr Route  Intravenous Administered By  Neyda Sheriff RN          heparin 100 UNIT/ML injection 5 mL     Admin Date  10/27/2021 Action  Given Dose  5 mL Route  Intracatheter Administered By  Neyda Sheriff RN          morphine (PF) injection 1 mg     Admin Date  10/27/2021 Action  Given Dose  1 mg Route  Intravenous Administered By  Neyda Sheriff RN          morphine (PF) injection 2 mg     Admin Date  10/27/2021 Action  Given Dose  1 mg Route  Intravenous Administered By  Neyda Sheriff RN           Admin Date  10/27/2021 Action  Given Dose  2 mg Route  Intravenous Administered By  Neyda Sheriff RN           Admin Date  10/27/2021 Action  Given Dose  2 mg Route  Intravenous Administered By  Roz Arellano,  JOE Sheriff RN                          Again, thank you for allowing me to participate in the care of your patient.        Sincerely,        [facility]

## 2021-10-27 NOTE — PATIENT INSTRUCTIONS
Dear Jennifer Cervantes    Thank you for choosing [facility] for your infusion.  The following information is a summary of our appointment as well as important reminders.          We look forward in seeing you on your next appointment here at [facility]  Please don t hesitate to call us at [phone] to reschedule any of your appointments or to speak with one of the [facility] registered nurses.  It was a pleasure taking care of you today.    Sincerely,    [facility]  [address] [place] [address]  [address]  Phone:  [phone]

## 2021-10-27 NOTE — TELEPHONE ENCOUNTER
Pt called in to triage requesting IVF pain meds for lower back pain rated 10/10 x a few hours. Stated last took prn morphine, oxycodone, and tylenol this morning without relief. Denied any fevers, chills, cough, sob, chest pain or other symptoms. Pt's last infusion was 10/26, last clinic visit 10/18/21 with follow-up on 11/1/21 with Andrei Machado. Patient states they do not have own ride and it will take 15 minutes long to get to cancer clinic. Pt denied being out of home medications and needing any refills today. Pt qualifies for sickle cell standing order protocol.

## 2021-10-27 NOTE — PROGRESS NOTES
This is a recent snapshot of the patient's [facility] medical record.  For current drug dose and complete information and questions, call [phone] or In Basket pool, fv home infusion ([id number])  CSN Number:  [id number]

## 2021-10-27 NOTE — PROGRESS NOTES
Infusion Nursing Note:  Jennifer Cervantes presents today for IVF+Pain medication.    Patient seen by provider today: No   present during visit today: Not Applicable.    Note: D5 in NS given over 2 hours at 250 ml/h.      Intravenous Access:  Implanted Port.    Treatment Conditions:  Not Applicable.      Post Infusion Assessment:  Patient tolerated infusion without incident.  Blood return noted pre and post infusion.  Site patent and intact, free from redness, edema or discomfort.  No evidence of extravasations.  Access discontinued per protocol.       Discharge Plan:   Discharge instructions reviewed with: Patient.  Patient and/or family verbalized understanding of discharge instructions and all questions answered.  Patient discharged in stable condition accompanied by: self.  Departure Mode: Ambulatory.    Administrations This Visit     dextrose 5% and 0.45% NaCl BOLUS     Admin Date  10/27/2021 Action  New Bag Dose  500 mL Rate  250 mL/hr Route  Intravenous Administered By  Neyda Sheriff RN          heparin 100 UNIT/ML injection 5 mL     Admin Date  10/27/2021 Action  Given Dose  5 mL Route  Intracatheter Administered By  Neyda Sheriff RN          morphine (PF) injection 1 mg     Admin Date  10/27/2021 Action  Given Dose  1 mg Route  Intravenous Administered By  Neyda Sheriff RN          morphine (PF) injection 2 mg     Admin Date  10/27/2021 Action  Given Dose  1 mg Route  Intravenous Administered By  Neyda Sheriff RN           Admin Date  10/27/2021 Action  Given Dose  2 mg Route  Intravenous Administered By  Neyda Sheriff RN           Admin Date  10/27/2021 Action  Given Dose  2 mg Route  Intravenous Administered By  Roz Arellano RN Hope Balcos, RN

## 2021-10-28 ENCOUNTER — INFUSION THERAPY VISIT (OUTPATIENT)
Dept: ONCOLOGY | Facility: CLINIC | Age: 21
End: 2021-10-28
Attending: STUDENT IN AN ORGANIZED HEALTH CARE EDUCATION/TRAINING PROGRAM
Payer: COMMERCIAL

## 2021-10-28 ENCOUNTER — TELEPHONE (OUTPATIENT)
Dept: ONCOLOGY | Facility: CLINIC | Age: 21
End: 2021-10-28

## 2021-10-28 VITALS
HEART RATE: 109 BPM | OXYGEN SATURATION: 92 % | RESPIRATION RATE: 14 BRPM | TEMPERATURE: 98.9 F | DIASTOLIC BLOOD PRESSURE: 90 MMHG | SYSTOLIC BLOOD PRESSURE: 136 MMHG

## 2021-10-28 DIAGNOSIS — G81.10 SPASTIC HEMIPLEGIA, UNSPECIFIED ETIOLOGY, UNSPECIFIED LATERALITY (H): Primary | ICD-10-CM

## 2021-10-28 DIAGNOSIS — D57.00 SICKLE CELL PAIN CRISIS (H): ICD-10-CM

## 2021-10-28 PROCEDURE — 96374 THER/PROPH/DIAG INJ IV PUSH: CPT

## 2021-10-28 PROCEDURE — 250N000011 HC RX IP 250 OP 636: Performed by: PEDIATRICS

## 2021-10-28 PROCEDURE — 258N000003 HC RX IP 258 OP 636: Performed by: PEDIATRICS

## 2021-10-28 PROCEDURE — 96376 TX/PRO/DX INJ SAME DRUG ADON: CPT

## 2021-10-28 PROCEDURE — 96361 HYDRATE IV INFUSION ADD-ON: CPT

## 2021-10-28 RX ORDER — HEPARIN SODIUM (PORCINE) LOCK FLUSH IV SOLN 100 UNIT/ML 100 UNIT/ML
5 SOLUTION INTRAVENOUS
Status: DISCONTINUED | OUTPATIENT
Start: 2021-10-28 | End: 2021-10-28 | Stop reason: HOSPADM

## 2021-10-28 RX ORDER — ONDANSETRON 8 MG/1
8 TABLET, FILM COATED ORAL
Status: CANCELLED
Start: 2022-01-01

## 2021-10-28 RX ORDER — MORPHINE SULFATE 2 MG/ML
2 INJECTION, SOLUTION INTRAMUSCULAR; INTRAVENOUS
Status: CANCELLED
Start: 2022-01-01

## 2021-10-28 RX ORDER — HEPARIN SODIUM (PORCINE) LOCK FLUSH IV SOLN 100 UNIT/ML 100 UNIT/ML
5 SOLUTION INTRAVENOUS
Status: CANCELLED | OUTPATIENT
Start: 2022-01-01

## 2021-10-28 RX ORDER — MORPHINE SULFATE 2 MG/ML
2 INJECTION, SOLUTION INTRAMUSCULAR; INTRAVENOUS
Status: DISCONTINUED | OUTPATIENT
Start: 2021-10-28 | End: 2021-10-28 | Stop reason: HOSPADM

## 2021-10-28 RX ORDER — DIPHENHYDRAMINE HCL 25 MG
25 CAPSULE ORAL
Status: CANCELLED
Start: 2022-01-01

## 2021-10-28 RX ORDER — HEPARIN SODIUM,PORCINE 10 UNIT/ML
5 VIAL (ML) INTRAVENOUS
Status: CANCELLED | OUTPATIENT
Start: 2022-01-01

## 2021-10-28 RX ADMIN — MORPHINE SULFATE 2 MG: 2 INJECTION, SOLUTION INTRAMUSCULAR; INTRAVENOUS at 14:01

## 2021-10-28 RX ADMIN — DEXTROSE AND SODIUM CHLORIDE 500 ML: 5; 450 INJECTION, SOLUTION INTRAVENOUS at 13:00

## 2021-10-28 RX ADMIN — Medication 5 ML: at 15:16

## 2021-10-28 RX ADMIN — MORPHINE SULFATE 2 MG: 2 INJECTION, SOLUTION INTRAMUSCULAR; INTRAVENOUS at 13:01

## 2021-10-28 RX ADMIN — MORPHINE SULFATE 2 MG: 2 INJECTION, SOLUTION INTRAMUSCULAR; INTRAVENOUS at 15:01

## 2021-10-28 NOTE — PROGRESS NOTES
Infusion Nursing Note:  Jennifer Cervantes presents today for IVFs and pain meds.    Patient seen by provider today: No    Note: Add on per triage for IVFs and pain meds. Patient presents stating her pain is 9/10 and her goal pain is 5/10. Denies any fevers, chills, chest pain or shortness of breath.    7/10 after Dose #1  6/10 after Dose #2  /10 after Dose #3    Intravenous Access:  Implanted Port.    Treatment Conditions:  Not Applicable.    Post Infusion Assessment:  Patient tolerated infusion without incident.  Blood return noted pre and post infusion.  Site patent and intact, free from redness, edema or discomfort.  No evidence of extravasations.  Access discontinued per protocol.     Discharge Plan:   Patient declined prescription refills.  Discharge instructions reviewed with: Patient.  Patient and/or family verbalized understanding of discharge instructions and all questions answered.  AVS printed and given to patient.  Patient will return 11/1 for next appointment.   Patient discharged in stable condition accompanied by: self.  Departure Mode: Ambulatory.      Nichole Milian RN

## 2021-10-28 NOTE — PATIENT INSTRUCTIONS
[facility] Triage and after hours / weekends / holidays:  [phone]    Please call the triage or after hours line if you experience a temperature greater than or equal to 100.5, shaking chills, have uncontrolled nausea, vomiting and/or diarrhea, dizziness, shortness of breath, chest pain, bleeding, unexplained bruising, or if you have any other new/concerning symptoms, questions or concerns.      If you are having any concerning symptoms or wish to speak to a provider before your next infusion visit, please call your care coordinator or triage to notify them so we can adequately serve you.     If you need a refill on a narcotic prescription or other medication, please call before your infusion appointment.             The CDC has recommended that individuals who are moderately to severely immunocompromised may benefit from an additional dose to make sure they have enough protection against COVID-19. We encourage you to receive a booster vaccine when you can. [facility] is offering booster vaccines. Updated information on vaccines at [facility] can be found at: [email or web address] . Most local pharmacies, [facility] and [employer] for example are also offering booster vaccines.       Currently, CDC is recommending that moderately to severely immunocompromised people receive an additional dose. This includes people who have:   -Been receiving active cancer treatment for tumors or cancers of the blood   -Received an organ transplant and are taking medicine to suppress the immune system   -Received a stem cell transplant within the last 2 years or are taking medicine to suppress the immune system   -Moderate or severe primary immunodeficiency (such as DiGeorge syndrome, [place] syndrome)   -Advanced or untreated HIV infection   -Active treatment with high-dose corticosteroids or other drugs that may suppress your immune response     The most current information can be found  on the CDC website: https://www.cdc.gov/coronavirus/2019-ncov/vaccines/recommendations/immuno.html     The [facility] is leading a research study that aims to better understand the immune system s response to COVID-19 vaccines. The primary focus is to understand if immunocompromised people have different responses to the vaccine. If you are interested in learning more about the [employer] COVID-19 Vaccine Response Study, please visit: [email or web address]

## 2021-10-28 NOTE — TELEPHONE ENCOUNTER
Pt called in to triage requesting IVF pain meds for all over and in legs, difficulty walking, not swollen.  Pain rated 9/10, started at 4 a.m. this morning. Pain quality is sharp/stabbing.  This is usual sickle cell pain.   Stated last took prn oxycodone, MS contin and tylenol 6 a.m. this morning without relief.   Denied any fevers, chills, cough, sob, chest pain or other symptoms.   Pt's last infusion was 10/27/21 (10/22, 10/25, 10/26), last clinic visit 10/18 with follow-up on 11/1/21 with Andrei Machado.   Pt does not qualify for sickle cell standing order protocol per patient care order since this is the fourth day in a row pt will have had infusion in the clinic. Paged Dr. Duncan at [phone] and [phone]  Pt denied being out of home medications and needing any refills today.   Transportation: Does not need ride. Pt is 15 minutes away.    Routed to Andrei Krishnamurthy and Pt care Team.   Infusion can take Jennifer at 1230.   Waiting for Dr. Duncan's approval.  1107: Per Dr. Duncan, pt can come today, but will need to wait until she is evaluated by provider at her next apt before scheduling outpatient infusion again.     Spoke with Jennifer. She confirms she has her own transportation and can be here at 1230. Informed Jennifer that she will need to wait until she is evaluated by provider at her next apt before scheduling outpatient infusion again. Jennifer verbalized understanding of this information.    IB sent to scheduling.   Informed Infusion that pt can be here at 1230.

## 2021-10-29 DIAGNOSIS — D57.00 SICKLE CELL PAIN CRISIS (H): ICD-10-CM

## 2021-10-29 RX ORDER — OXYCODONE HYDROCHLORIDE 15 MG/1
TABLET ORAL
Qty: 50 TABLET | Refills: 0 | Status: SHIPPED | OUTPATIENT
Start: 2021-10-29 | End: 2021-11-10

## 2021-10-29 NOTE — TELEPHONE ENCOUNTER
Jennifer called asking to clarify parameters for next time for IVF/Pain.    Per Jennifer, was told needed a provide appt before could get another IVF/Pain appt, pt is scheduled for virtual visit on 11/1.     This writer verified plan, pt to have virtual visit with Andrei Patiño on Monday 11/1 and then at that time would determine IVF/Pain appt plan.    Narcotic Refill Request    Medication(s) requested:  Oxycodone 15mg tablets  Person Requesting Refill: Jennifer (pt)  What pain is the medication treating: sickle cell pain  How is the medication being taken?: 1 every 6hours  Does pt have enough for today? yes  Is pain being adequately controlled on the current regimen?: Yes  Experiencing any side effects from medication?: denies    Date of most recent appointment:  10/18/21 with Dr. Duncan  Any No Show Visits: none recently  Next appointment:   11/1/21 with Andrei HIGGINS  Last fill date and by whom: Dr. Duncan on 10/19/21   Reviewed:     Routed to Dr. Duncan

## 2021-10-31 NOTE — PROGRESS NOTES
Jennifer is a 21 year old who is being evaluated via a billable video visit.      How would you like to obtain your AVS? MyChart  If the video visit is dropped, the invitation should be resent by: Text to cell phone: [phone]  Will anyone else be joining your video visit? 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      She was admitted 2/1/21-2/3/21 with a new PE, started on Rivaroxaban. Switched to Eliquis 3/25/21 with RUE DVT.     She was admitted 4/26/21-5/11/21 with sickle cell pain crisis complicated by worsening PE in setting of low Apixaban levels, acute hypoxic respiratory failure, pneumonia, acute chest syndrome s/p exchange transfusion on 4/30 and 5/4. After 2nd exchange her oxygen requirement dramatically improved from 20L to 1-2L 5/5 and she was off oxygen as of 5/6.      She was admitted 7/13/21-7/25/21 for acute on chronic PE, switched to dabigitran + ASA.     She has been doing better recently. Her pain plan was changed to no IV opioids in ED and she has not been in ED for 6 weeks.     She was called today for hematology follow-up.     Interval History:  Jennifer was called today for follow-up. She overall is doing well though does note that the colder weather has worsened her pain-mainly back, arms, and legs. The leg pain makes it hard to walk at times. She is managing with her home meds but notes she needs the infusion especially after the weekend. She also notes ongoing ORTEGA. This has been going on for a  month or two. She gets winded with most activities and it is really difficult to do stairs. She denies chest pain, SOB at rest, fevers, cough, leg swelling. She tells me she is taking her Pradaxa and ASA daily with no missed doses.     She denies any GI concerns other than occasional nausea, managed with Zofran. She still gets migraines and is waiting on a plan for neurology for this. She is back on Desferal, started last weekend. Last Jr was 10/19.     Current Outpatient Medications   Medication Sig Dispense Refill     acetaminophen (TYLENOL) 325 MG tablet Take 2 tablets (650 mg) by mouth every 6 hours as needed for mild pain 120 tablet 3     albuterol (PROAIR HFA/PROVENTIL HFA/VENTOLIN HFA) 108 (90 Base) MCG/ACT inhaler Inhale 2 puffs into the lungs every 6 hours as needed for shortness of breath / dyspnea or wheezing 8.5 g 3     albuterol (PROVENTIL) (2.5 MG/3ML) 0.083% neb solution Take 1 vial (2.5 mg) by nebulization every 6 hours as needed for shortness of breath / dyspnea or wheezing 12 mL 4     ARIPiprazole (ABILIFY) 2 MG tablet Take 1 tablet (2 mg) by mouth daily 30 tablet 3     aspirin (ASA) 81 MG chewable tablet Take 1 tablet (81 mg) by mouth daily       budesonide-formoterol (SYMBICORT) 160-4.5 MCG/ACT Inhaler Inhale 2 puffs into the lungs 2 times daily 10.2 g 3     dabigatran ANTICOAGULANT (PRADAXA) 150 MG capsule Take 1 capsule (150 mg) by mouth 2 times daily Store in original 's bottle or blister pack; use within 120 days of opening. 60 capsule 0     diclofenac (VOLTAREN) 1 % topical gel Apply 4 g topically 4 times daily as needed for moderate pain Apply to back, legs, and arms for pain (Patient not taking: Reported on 10/28/2021) 150 g 3     diphenhydrAMINE (BENADRYL) 25 MG capsule Take 1-2 capsules (25-50 mg) by mouth nightly as needed for sleep 60 capsule 3     DULoxetine (CYMBALTA) 30 MG capsule Take 1 capsule (30 mg) by mouth daily for 7 days, THEN 1 capsule (30 mg) 2 times daily.  187 capsule 0     EPINEPHrine (ANY BX GENERIC EQUIV) 0.3 MG/0.3ML injection 2-pack Inject 0.3 mLs (0.3 mg) into the muscle as needed for anaphylaxis 1 each 1     Hydroxyurea 1000 MG TABS Take 2,000 mg by mouth daily 60 tablet 3     hydrOXYzine (ATARAX) 25 MG tablet Take 1 tablet (25 mg) by mouth 3 times daily as needed for anxiety 30 tablet 1     JADENU 360 MG tablet Take 4 tablets (1,440 mg) by mouth every evening 120 tablet 4     lidocaine-prilocaine (EMLA) 2.5-2.5 % external cream Apply topically once for 1 dose Use for port site as needed 30 g 1     medroxyPROGESTERone (DEPO-PROVERA) 150 MG/ML IM injection Inject 150 mg into the muscle       morphine (MS CONTIN) 15 MG CR tablet Take 1 tablet (15 mg) by mouth every 12 hours Take 1/2 tablet every 12 hours initially 60 tablet 0     naloxone (NARCAN) 4 MG/0.1ML nasal spray Spray 1 spray (4 mg) into one nostril alternating nostrils once as needed for opioid reversal every 2-3 minutes until assistance arrives 0.2 mL 1     omeprazole (PRILOSEC) 20 MG DR capsule Take 1 capsule (20 mg) by mouth daily 30 capsule 1     ondansetron (ZOFRAN) 8 MG tablet Take 1 tablet (8 mg) by mouth every 8 hours as needed 30 tablet 1     oxyCODONE IR (ROXICODONE) 15 MG tablet Take 1 tablet (15mg) by mouth every 4-6 hours as needed for severe pain. Goal 4 per day. Max 6 per day. 50 tablet 0       Past Medical History  Past Medical History:   Diagnosis Date     Anxiety      Bleeding disorder (H)      Blood clotting disorder (H)      Cerebral infarction (H) 2015     Cognitive developmental delay     low IQ. 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 (H)     right hand contractures     Iron overload due to repeated red blood cell transfusions      Migraines      Multiple subsegmental pulmonary emboli without acute cor pulmonale (H) 02/01/2021     Oppositional  defiant behavior      Superficial venous thrombosis of arm, right 03/25/2021     Uncomplicated asthma      Past Surgical History:   Procedure Laterality Date     AS INSERT TUNNELED CV 2 CATH W/O PORT/PUMP       C BREAST REDUCTION (INCLUDES LIPO) TIER 3 Bilateral 04/23/2019     CHOLECYSTECTOMY       INSERT PORT VASCULAR ACCESS Left 4/21/2021    Procedure: INSERTION, VASCULAR ACCESS PORT (NOT SURE ON SIDE UNTIL REMOVAL);  Surgeon: Rajan More MD;  Location: [facility] OR     IR CHEST PORT PLACEMENT > 5 YRS OF AGE  4/21/2021     IR CVC NON TUNNEL LINE REMOVAL  5/6/2021     IR CVC NON TUNNEL PLACEMENT  04/07/2020     IR CVC NON TUNNEL PLACEMENT  4/30/2021     IR PORT REMOVAL LEFT  4/21/2021     REMOVE PORT VASCULAR ACCESS Left 4/21/2021    Procedure: REMOVAL, VASCULAR ACCESS PORT LEFT;  Surgeon: Rajan More MD;  Location: [facility] OR     REPAIR TENDON ELBOW Right 10/02/2019    Procedure: Right Elbow Flexor Lengthening, Flexor Pronator Slide Of Wrist and Finger, Thumb Adductor Lengthening;  Surgeon: Anai Franco MD;  Location: [facility] OR     TONSILLECTOMY Bilateral 10/02/2019    Procedure: Bilateral Tonsillectomy;  Surgeon: Farhana Guy MD;  Location: [facility] OR     Allergies   Allergen Reactions     Contrast Dye      Hives and breathing issues     Fish-Derived Products Hives     Seafood Hives     Diagnostic X-Ray Materials      Gadolinium      Social History   Social History     Tobacco Use     Smoking status: Never Smoker     Smokeless tobacco: Never Used   Substance Use Topics     Alcohol use: Not Currently     Alcohol/week: 0.0 standard drinks     Drug use: Never      Past medical history and social history were reviewed.    Physical Examination:  There were no vitals taken for this visit.  Wt Readings from Last 10 Encounters:   10/19/21 77 kg (169 lb 11.2 oz)   10/13/21 77.2 kg (170 lb 4.8 oz)   09/29/21 77.1 kg (170 lb)   09/22/21 77.7 kg (171 lb 3.2 oz)   09/20/21 78 kg (172 lb)   09/17/21 78 kg (172 lb)    09/15/21 78 kg (172 lb)   09/12/21 77.1 kg (170 lb)   09/11/21 77.1 kg (170 lb)   09/07/21 77.1 kg (170 lb)     Video physical exam  General: Patient appears well in no acute distress.   Skin: No visualized rash or lesions on visualized skin  Eyes: EOMI, no erythema, sclera icterus or discharge noted  Resp: Appears to be breathing comfortably without accessory muscle usage, speaking in full sentences, no cough  MSK: Appears to have normal range of motion based on visualized movements  Neurologic: No apparent tremors, facial movements symmetric  Psych: affect normal, alert and oriented    The rest of a comprehensive physical examination is deferred due to PHE (public health emergency) video restrictions    Laboratory Data:  Results for HIMANSHU AL (MRN [id number]) as of 11/1/2021 13:28   11/1/2021 12:28   Sodium 137   Potassium 3.8   Chloride 111 (H)   Carbon Dioxide 20   Urea Nitrogen 6 (L)   Creatinine 0.55   GFR Estimate >90   Calcium 8.7   Anion Gap 6   Albumin 4.3   Protein Total 8.2   Bilirubin Total 4.3 (H)   Alkaline Phosphatase 83   ALT 61 (H)   AST 89 (H)   Glucose 90   WBC 12.4 (H)   Hemoglobin 8.1 (L)   Hematocrit 22.3 (L)   Platelet Count 266   RBC Count 2.40 (L)   MCV 93   MCH 33.8 (H)   MCHC 36.3   RDW 25.0 (H)   % Neutrophils 67   % Lymphocytes 21   % Monocytes 9   % Eosinophils 2   % Basophils 1   Absolute Basophils 0.1   NRBC/W 15 (H)   Absolute Neutrophil 8.3   Absolute Lymphocytes 2.6   Absolute Monocytes 1.1   Absolute Eosinophils 0.2   Absolute NRBCs 1.9 (H)   RBC Morphology Confirmed RBC Indices   Platelet Morphology Automated Count Confirmed. Platelet morphology is normal.   Polychromasia Moderate (A)   Sickle Cells Marked (A)   Target Cells Slight (A)   % Retic 19.2 (H)   Absolute Retic 0.480 (H)         Assessment and Plan:  1. Sickle Cell Disease  HgbSS complicated by hx stroke, acute chest, iron overload, chronic pain, acute on chronic pulmonary embolism with multiple  hospitalizations. She has been doing better recently with no ED visits, though she is in the infusion center almost daily. For now no changes to plan, though will need to strategize ways to lessen infusion room utilization such as scheduling 2-3x per week. Will discuss with infusion team and Dr. Duncan.      Labs: Hgb stsable. WBC slightly elevated but at baseline. Retic improved. CMP at baseline with mild LFT /bili elevation. Overall no concerns.      Meds: Stopped Voxeletor. Continue Hydrea 2000mg daily. Contineu Jadenu 4 tablets daily. She is now on Crizanlizumab monthly last given 10/19-due 11/16. Continue Desferal every other weekend.      Pain Control: Continue MSContin 15mg BID. Continue Oxycodone to 15mg PRN max 6 per day-#50 tabs per week. DO NOT INCREASE. Continue Cymbalta 30mg BID. Continue Tylenol PRN.      Follow-up: Continue ANDREAS or MD monthly, sooner if issues arise.      2. Neuro  History of stroke. Continue ASA.     Dr. Pierce doing botox for spasticity and symptoms improving.      Migraines, chronic issue, saw neurology. Will follow-up with them on recs.      3. Psych  History of anxiety and depression. Doing well with Cymbalta and Abilify.      Follows with outside therapist.     OK to use Benadryl PRN insomnia.      4. Pulm  History of asthma, continue Symbicort and Albuterol PRN. Still needs to see pulm, not discussed.     Acute on chronic PE and DVT, failure of apixaban and rivaroxaban despite therapeutic levels. Now on Dabigitran + ASA. Admits to more ORTEGA over last 1-2 months. Will have nursing check O2 sats in clinic. If low may need to repeat VQ scan and echo (prior concern for pulm HTN). She wonders about blood transfusion for this, will check hgb as above.    Spoke with nursing: O2 sats WNL at rest and exertion. HR is higher. Will check echo but hold off on repeat VQ scan. Hgb better so no need for blood transfusion. I suspect some aspect of deconditioning as well as she tells me she spends  almost all day in her room every day.     60 minutes spent on the date of the encounter doing chart review, review of test results, patient visit, documentation and discussion with other provider(s)     Andrie Machado PA-C  Department of Hematology and Oncology  [facility]

## 2021-11-01 ENCOUNTER — PATIENT OUTREACH (OUTPATIENT)
Dept: CARE COORDINATION | Facility: CLINIC | Age: 21
End: 2021-11-01

## 2021-11-01 ENCOUNTER — VIRTUAL VISIT (OUTPATIENT)
Dept: ONCOLOGY | Facility: CLINIC | Age: 21
End: 2021-11-01
Attending: PHYSICIAN ASSISTANT
Payer: COMMERCIAL

## 2021-11-01 ENCOUNTER — APPOINTMENT (OUTPATIENT)
Dept: LAB | Facility: CLINIC | Age: 21
End: 2021-11-01
Attending: PHYSICIAN ASSISTANT
Payer: COMMERCIAL

## 2021-11-01 ENCOUNTER — TELEPHONE (OUTPATIENT)
Dept: ONCOLOGY | Facility: CLINIC | Age: 21
End: 2021-11-01

## 2021-11-01 VITALS
RESPIRATION RATE: 16 BRPM | BODY MASS INDEX: 28.67 KG/M2 | DIASTOLIC BLOOD PRESSURE: 95 MMHG | WEIGHT: 167 LBS | TEMPERATURE: 98 F | SYSTOLIC BLOOD PRESSURE: 139 MMHG | HEART RATE: 98 BPM | OXYGEN SATURATION: 95 %

## 2021-11-01 DIAGNOSIS — D57.00 SICKLE CELL PAIN CRISIS (H): Primary | ICD-10-CM

## 2021-11-01 DIAGNOSIS — I26.94 MULTIPLE SUBSEGMENTAL PULMONARY EMBOLI WITHOUT ACUTE COR PULMONALE (H): ICD-10-CM

## 2021-11-01 DIAGNOSIS — G81.10 SPASTIC HEMIPLEGIA, UNSPECIFIED ETIOLOGY, UNSPECIFIED LATERALITY (H): ICD-10-CM

## 2021-11-01 DIAGNOSIS — I82.611 SUPERFICIAL VENOUS THROMBOSIS OF ARM, RIGHT: ICD-10-CM

## 2021-11-01 DIAGNOSIS — R06.00 DYSPNEA, UNSPECIFIED TYPE: ICD-10-CM

## 2021-11-01 DIAGNOSIS — K29.00 OTHER ACUTE GASTRITIS WITHOUT HEMORRHAGE: ICD-10-CM

## 2021-11-01 DIAGNOSIS — D57.00 SICKLE CELL CRISIS (H): ICD-10-CM

## 2021-11-01 DIAGNOSIS — J45.909 ASTHMATIC BRONCHITIS WITHOUT COMPLICATION, UNSPECIFIED ASTHMA SEVERITY, UNSPECIFIED WHETHER PERSISTENT: ICD-10-CM

## 2021-11-01 DIAGNOSIS — D57.1 HB-SS DISEASE WITHOUT CRISIS (H): ICD-10-CM

## 2021-11-01 DIAGNOSIS — G47.00 INSOMNIA, UNSPECIFIED TYPE: ICD-10-CM

## 2021-11-01 LAB
ALBUMIN SERPL-MCNC: 4.3 G/DL (ref 3.4–5)
ALP SERPL-CCNC: 83 U/L (ref 40–150)
ALT SERPL W P-5'-P-CCNC: 61 U/L (ref 0–50)
ANION GAP SERPL CALCULATED.3IONS-SCNC: 6 MMOL/L (ref 3–14)
AST SERPL W P-5'-P-CCNC: 89 U/L (ref 0–45)
BASOPHILS # BLD MANUAL: 0.1 10E3/UL (ref 0–0.2)
BASOPHILS NFR BLD MANUAL: 1 %
BILIRUB SERPL-MCNC: 4.3 MG/DL (ref 0.2–1.3)
BUN SERPL-MCNC: 6 MG/DL (ref 7–30)
CALCIUM SERPL-MCNC: 8.7 MG/DL (ref 8.5–10.1)
CHLORIDE BLD-SCNC: 111 MMOL/L (ref 94–109)
CO2 SERPL-SCNC: 20 MMOL/L (ref 20–32)
CREAT SERPL-MCNC: 0.55 MG/DL (ref 0.52–1.04)
EOSINOPHIL # BLD MANUAL: 0.2 10E3/UL (ref 0–0.7)
EOSINOPHIL NFR BLD MANUAL: 2 %
ERYTHROCYTE [DISTWIDTH] IN BLOOD BY AUTOMATED COUNT: 25 % (ref 10–15)
GFR SERPL CREATININE-BSD FRML MDRD: >90 ML/MIN/1.73M2
GLUCOSE BLD-MCNC: 90 MG/DL (ref 70–99)
HCT VFR BLD AUTO: 22.3 % (ref 35–47)
HGB BLD-MCNC: 8.1 G/DL (ref 11.7–15.7)
LYMPHOCYTES # BLD MANUAL: 2.6 10E3/UL (ref 0.8–5.3)
LYMPHOCYTES NFR BLD MANUAL: 21 %
MCH RBC QN AUTO: 33.8 PG (ref 26.5–33)
MCHC RBC AUTO-ENTMCNC: 36.3 G/DL (ref 31.5–36.5)
MCV RBC AUTO: 93 FL (ref 78–100)
MONOCYTES # BLD MANUAL: 1.1 10E3/UL (ref 0–1.3)
MONOCYTES NFR BLD MANUAL: 9 %
NEUTROPHILS # BLD MANUAL: 8.3 10E3/UL (ref 1.6–8.3)
NEUTROPHILS NFR BLD MANUAL: 67 %
NRBC # BLD AUTO: 1.9 10E3/UL
NRBC BLD MANUAL-RTO: 15 %
PLAT MORPH BLD: ABNORMAL
PLATELET # BLD AUTO: 266 10E3/UL (ref 150–450)
POLYCHROMASIA BLD QL SMEAR: ABNORMAL
POTASSIUM BLD-SCNC: 3.8 MMOL/L (ref 3.4–5.3)
PROT SERPL-MCNC: 8.2 G/DL (ref 6.8–8.8)
RBC # BLD AUTO: 2.4 10E6/UL (ref 3.8–5.2)
RBC MORPH BLD: ABNORMAL
RETICS # AUTO: 0.48 10E6/UL (ref 0.03–0.1)
RETICS/RBC NFR AUTO: 19.2 % (ref 0.5–2)
SICKLE CELLS BLD QL SMEAR: ABNORMAL
SODIUM SERPL-SCNC: 137 MMOL/L (ref 133–144)
TARGETS BLD QL SMEAR: SLIGHT
WBC # BLD AUTO: 12.4 10E3/UL (ref 4–11)

## 2021-11-01 PROCEDURE — 258N000003 HC RX IP 258 OP 636: Performed by: PEDIATRICS

## 2021-11-01 PROCEDURE — 82040 ASSAY OF SERUM ALBUMIN: CPT

## 2021-11-01 PROCEDURE — 99215 OFFICE O/P EST HI 40 MIN: CPT | Mod: GT | Performed by: PHYSICIAN ASSISTANT

## 2021-11-01 PROCEDURE — 36591 DRAW BLOOD OFF VENOUS DEVICE: CPT

## 2021-11-01 PROCEDURE — 85045 AUTOMATED RETICULOCYTE COUNT: CPT

## 2021-11-01 PROCEDURE — 250N000011 HC RX IP 250 OP 636: Performed by: PEDIATRICS

## 2021-11-01 PROCEDURE — 96376 TX/PRO/DX INJ SAME DRUG ADON: CPT

## 2021-11-01 PROCEDURE — 250N000011 HC RX IP 250 OP 636: Performed by: PHYSICIAN ASSISTANT

## 2021-11-01 PROCEDURE — 85014 HEMATOCRIT: CPT

## 2021-11-01 PROCEDURE — 96374 THER/PROPH/DIAG INJ IV PUSH: CPT

## 2021-11-01 RX ORDER — ONDANSETRON 8 MG/1
8 TABLET, FILM COATED ORAL
Status: CANCELLED
Start: 2022-01-01

## 2021-11-01 RX ORDER — ASPIRIN 81 MG/1
81 TABLET, CHEWABLE ORAL DAILY
Qty: 90 TABLET | Refills: 3 | Status: ON HOLD | OUTPATIENT
Start: 2021-11-01 | End: 2021-11-21

## 2021-11-01 RX ORDER — HEPARIN SODIUM,PORCINE 10 UNIT/ML
5 VIAL (ML) INTRAVENOUS
Status: CANCELLED | OUTPATIENT
Start: 2022-01-01

## 2021-11-01 RX ORDER — MORPHINE SULFATE 2 MG/ML
2 INJECTION, SOLUTION INTRAMUSCULAR; INTRAVENOUS
Status: CANCELLED
Start: 2022-01-01

## 2021-11-01 RX ORDER — DIPHENHYDRAMINE HCL 25 MG
25 CAPSULE ORAL
Status: CANCELLED
Start: 2022-01-01

## 2021-11-01 RX ORDER — DABIGATRAN ETEXILATE 150 MG/1
150 CAPSULE ORAL 2 TIMES DAILY
Qty: 60 CAPSULE | Refills: 3 | Status: ON HOLD | OUTPATIENT
Start: 2021-11-01 | End: 2021-11-13

## 2021-11-01 RX ORDER — ONDANSETRON 8 MG/1
8 TABLET, ORALLY DISINTEGRATING ORAL
Status: DISCONTINUED | OUTPATIENT
Start: 2021-11-01 | End: 2021-11-01 | Stop reason: HOSPADM

## 2021-11-01 RX ORDER — MORPHINE SULFATE 2 MG/ML
2 INJECTION, SOLUTION INTRAMUSCULAR; INTRAVENOUS
Status: DISCONTINUED | OUTPATIENT
Start: 2021-11-01 | End: 2021-11-01 | Stop reason: HOSPADM

## 2021-11-01 RX ORDER — HEPARIN SODIUM (PORCINE) LOCK FLUSH IV SOLN 100 UNIT/ML 100 UNIT/ML
5 SOLUTION INTRAVENOUS
Status: DISCONTINUED | OUTPATIENT
Start: 2021-11-01 | End: 2021-11-01 | Stop reason: HOSPADM

## 2021-11-01 RX ORDER — HEPARIN SODIUM (PORCINE) LOCK FLUSH IV SOLN 100 UNIT/ML 100 UNIT/ML
5 SOLUTION INTRAVENOUS ONCE
Status: COMPLETED | OUTPATIENT
Start: 2021-11-01 | End: 2021-11-01

## 2021-11-01 RX ORDER — DULOXETIN HYDROCHLORIDE 30 MG/1
30 CAPSULE, DELAYED RELEASE ORAL 2 TIMES DAILY
Qty: 60 CAPSULE | Refills: 3 | Status: SHIPPED | OUTPATIENT
Start: 2021-11-01 | End: 2021-11-30

## 2021-11-01 RX ORDER — HEPARIN SODIUM (PORCINE) LOCK FLUSH IV SOLN 100 UNIT/ML 100 UNIT/ML
5 SOLUTION INTRAVENOUS
Status: CANCELLED | OUTPATIENT
Start: 2022-01-01

## 2021-11-01 RX ORDER — DIPHENHYDRAMINE HCL 25 MG
25 CAPSULE ORAL
Status: DISCONTINUED | OUTPATIENT
Start: 2021-11-01 | End: 2021-11-01 | Stop reason: HOSPADM

## 2021-11-01 RX ADMIN — Medication 5 ML: at 14:50

## 2021-11-01 RX ADMIN — MORPHINE SULFATE 2 MG: 2 INJECTION, SOLUTION INTRAMUSCULAR; INTRAVENOUS at 12:47

## 2021-11-01 RX ADMIN — MORPHINE SULFATE 2 MG: 2 INJECTION, SOLUTION INTRAMUSCULAR; INTRAVENOUS at 14:47

## 2021-11-01 RX ADMIN — DEXTROSE AND SODIUM CHLORIDE 500 ML: 5; 450 INJECTION, SOLUTION INTRAVENOUS at 12:46

## 2021-11-01 RX ADMIN — SODIUM CHLORIDE, PRESERVATIVE FREE 5 ML: 5 INJECTION INTRAVENOUS at 12:29

## 2021-11-01 RX ADMIN — MORPHINE SULFATE 2 MG: 2 INJECTION, SOLUTION INTRAMUSCULAR; INTRAVENOUS at 13:47

## 2021-11-01 ASSESSMENT — PAIN SCALES - GENERAL: PAINLEVEL: EXTREME PAIN (9)

## 2021-11-01 NOTE — PROGRESS NOTES
Infusion Nursing Note:  Jennifer Cervantes presents today for IVF/Pain medications.    Patient seen by provider today: Yes: Andrei Machado PA-C   present during visit today: Not Applicable.    Note: Patient reported to clinic today after seeing provider with no new complaints or concerns.  Patient received 3 doses Morphine and IVF, pain decreased to 7. Patient declined Benadryl and Zofran during infusion visit.    TORB: 1000 11/1/21 Andrei Machado PA-C/Aravind Feldman RN  -ok to proceed with IVF and pain medications.  [facility]  -get HR and O2 sats at rest and with exertion.  -Call with results.    TORB: 1328 11/1/21 Andrei Machado PA-C/Aravind Feldman RN  -will schedule Echo for patient.  -no need for transfusion, or VQ scan.  -Pt to go to ED with increase in SOB with rest.    Patient aware and verbally stated understanding.    Intravenous Access:  Implanted Port.    Treatment Conditions:  Lab Results   Component Value Date    HGB 8.1 (L) 11/01/2021    WBC 12.4 (H) 11/01/2021    ANEU 8.3 11/01/2021    ANEUTAUTO 8.0 09/12/2021     11/01/2021      Lab Results   Component Value Date     11/01/2021    POTASSIUM 3.8 11/01/2021    MAG 1.7 02/21/2021    CR 0.55 11/01/2021    MICAH 8.7 11/01/2021    BILITOTAL 4.3 (H) 11/01/2021    ALBUMIN 4.3 11/01/2021    ALT 61 (H) 11/01/2021    AST 89 (H) 11/01/2021     Post Infusion Assessment:  Patient tolerated infusion without incident.  Blood return noted pre and post infusion.  Site patent and intact, free from redness, edema or discomfort.  No evidence of extravasations.  Access discontinued per protocol.     Discharge Plan:   Prescription refills given for Tylenol, albuterol inhaler and neb, Abilify, ASA, Symbicort, Pradaxa, Benadryl, Cymbalta,  Hydrea, Omeprazole, Ondansetron.  Discharge instructions reviewed with: Patient.  Patient and/or family verbalized understanding of discharge instructions and all questions  answered.  AVS to patient via [employer].  Patient will return 11/2/21 Echo for next appointment.   Patient discharged in stable condition accompanied by: self.  Departure Mode: Ambulatory.  Face to Face time: 10 minutes.    Aravind Feldman RN

## 2021-11-01 NOTE — PROGRESS NOTES
Social Work Follow-Up  [facility]    Data/Intervention:  Patient Name:  Jennifer Cervantes  /Age:  2000 (21 year old)    Reason for Follow-Up:  Transportation      Collaborated With:    -Triage   -[facility]  -Patient    Intervention/Education/Resources Provided:  Jennifer has an appointment at 12 pm today and is needing assistance with transportation. SW called patient's insurance and ride was arranged with [employer]. SW called patient and gave them the details of their ride and encouraged to call SW with any issues.    Assessment/Plan:  SW will remain available as needed.  Previously provided patient/family with writer's contact information and availability.       CARLOS Chavez,LGSW  Hematology/Oncology Social Worker  Phone:[phone] Pager: [phone]

## 2021-11-01 NOTE — PATIENT INSTRUCTIONS
[facility] Main Line: [phone]    Call triage nurse with chills and/or temperature greater than or equal to 100.4, uncontrolled nausea/vomiting, diarrhea, constipation, dizziness, shortness of breath, chest pain, bleeding, unexplained bruising, or any new/concerning symptoms, questions/concerns.   If you are having any concerning symptoms or wish to speak to a provider before your next infusion visit, please call your care coordinator or triage to notify them so we can adequately serve you.   Nurse Triage line:  [phone]    If after hours, weekends, or holidays, call main hospital  and ask for Oncology doctor on call @ [phone]      November 2021 Sunday Monday Tuesday Wednesday Thursday Friday Saturday        1    VIDEO VISIT RETURN   9:00 AM   (60 min.)   Andrei Machado PA   [facility]    LAB CENTRAL  11:45 AM   (15 min.)   [facility] DRAW   [facility]    ONC INFUSION 2 HR (120 MIN)  12:30 PM   (120 min.)    ONC INFUSION NURSE   [facility] 2     3     4     5     6       7     8     9     10     11     12     13       14     15     16     17     18     19     20       21     22    HEARING EVALUATION   8:45 AM   (60 min.)   Isatu Wright AuD   [facility] [place]    MR ABDOMEN WO   4:30 PM   (45 min.)   [id number]   [facility] MRI [place] 23     24    [facility] NURSE VISIT   9:00 AM   (30 min.)   Nurse, [facility] [place] 25     26     27       28     29     30 December 2021 Sunday Monday Tuesday Wednesday Thursday Friday Saturday                  1    [facility] RETURN   2:40 PM   (40 min.)   Eric Alvarado MD   [facility] [place] 2     3     4       5     6     7     8     9     10      11       12     13     14     15     16     17     18       19     20     21     22     23     24     25       26     27     28     29     30     31                          Lab Results:  Recent Results (from the past 12 hour(s))   Comprehensive metabolic panel    Collection Time: 11/01/21 12:28 PM   Result Value Ref Range    Sodium 137 133 - 144 mmol/L    Potassium 3.8 3.4 - 5.3 mmol/L    Chloride 111 (H) 94 - 109 mmol/L    Carbon Dioxide (CO2) 20 20 - 32 mmol/L    Anion Gap 6 3 - 14 mmol/L    Urea Nitrogen 6 (L) 7 - 30 mg/dL    Creatinine 0.55 0.52 - 1.04 mg/dL    Calcium 8.7 8.5 - 10.1 mg/dL    Glucose 90 70 - 99 mg/dL    Alkaline Phosphatase 83 40 - 150 U/L    AST 89 (H) 0 - 45 U/L    ALT 61 (H) 0 - 50 U/L    Protein Total 8.2 6.8 - 8.8 g/dL    Albumin 4.3 3.4 - 5.0 g/dL    Bilirubin Total 4.3 (H) 0.2 - 1.3 mg/dL    GFR Estimate >90 >60 mL/min/1.73m2   Reticulocyte count    Collection Time: 11/01/21 12:28 PM   Result Value Ref Range    % Reticulocyte 19.2 (H) 0.5 - 2.0 %    Absolute Reticulocyte 0.480 (H) 0.025 - 0.095 10e6/uL   CBC with platelets and differential    Collection Time: 11/01/21 12:28 PM   Result Value Ref Range    WBC Count 12.4 (H) 4.0 - 11.0 10e3/uL    RBC Count 2.40 (L) 3.80 - 5.20 10e6/uL    Hemoglobin 8.1 (L) 11.7 - 15.7 g/dL    Hematocrit 22.3 (L) 35.0 - 47.0 %    MCV 93 78 - 100 fL    MCH 33.8 (H) 26.5 - 33.0 pg    MCHC 36.3 31.5 - 36.5 g/dL    RDW 25.0 (H) 10.0 - 15.0 %    Platelet Count 266 150 - 450 10e3/uL   Manual Differential    Collection Time: 11/01/21 12:28 PM   Result Value Ref Range    % Neutrophils 67 %    % Lymphocytes 21 %    % Monocytes 9 %    % Eosinophils 2 %    % Basophils 1 %    NRBCs per 100 WBC 15 (H) <=0 %    Absolute Neutrophils 8.3 1.6 - 8.3 10e3/uL    Absolute Lymphocytes 2.6 0.8 - 5.3 10e3/uL    Absolute Monocytes 1.1 0.0 - 1.3 10e3/uL    Absolute Eosinophils 0.2 0.0 - 0.7 10e3/uL    Absolute Basophils 0.1 0.0 - 0.2 10e3/uL    Absolute NRBCs 1.9 (H) <=0.0 10e3/uL     RBC Morphology Confirmed RBC Indices     Platelet Assessment  Automated Count Confirmed. Platelet morphology is normal.     Automated Count Confirmed. Platelet morphology is normal.    Polychromasia Moderate (A) None Seen    Sickle Cells Marked (A) None Seen    Target Cells Slight (A) None Seen

## 2021-11-01 NOTE — LETTER
11/1/2021         RE: Jennifer Cervantes  [address]  [facility] [address]        Dear Colleague,    Thank you for referring your patient, Jennifer Cervantes, to the [facility]. Please see a copy of my visit note below.    Jennifer is a 21 year old who is being evaluated via a billable video visit.      How would you like to obtain your AVS? MyChart  If the video visit is dropped, the invitation should be resent by: Text to cell phone: [phone]  Will anyone else be joining your video visit? 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      She was admitted 2/1/21-2/3/21 with a new PE, started on Rivaroxaban. Switched to Eliquis 3/25/21 with RUE DVT.     She was admitted 4/26/21-5/11/21 with sickle cell pain crisis complicated by worsening PE in setting of low Apixaban levels, acute hypoxic respiratory failure, pneumonia, acute chest syndrome s/p exchange transfusion on 4/30 and 5/4. After 2nd exchange her oxygen requirement dramatically improved from 20L to 1-2L 5/5 and she was off oxygen as of 5/6.      She was admitted 7/13/21-7/25/21 for acute on chronic PE, switched to dabigitran + ASA.     She has been doing better recently. Her pain plan was changed to no IV opioids in ED and she has not been in ED for 6 weeks.     She was called today for hematology follow-up.     Interval History:  Jennifer was called today for follow-up. She overall is doing well though does note that the colder  weather has worsened her pain-mainly back, arms, and legs. The leg pain makes it hard to walk at times. She is managing with her home meds but notes she needs the infusion especially after the weekend. She also notes ongoing ORTEGA. This has been going on for a month or two. She gets winded with most activities and it is really difficult to do stairs. She denies chest pain, SOB at rest, fevers, cough, leg swelling. She tells me she is taking her Pradaxa and ASA daily with no missed doses.     She denies any GI concerns other than occasional nausea, managed with Zofran. She still gets migraines and is waiting on a plan for neurology for this. She is back on Desferal, started last weekend. Last Jr was 10/19.     Current Outpatient Medications   Medication Sig Dispense Refill     acetaminophen (TYLENOL) 325 MG tablet Take 2 tablets (650 mg) by mouth every 6 hours as needed for mild pain 120 tablet 3     albuterol (PROAIR HFA/PROVENTIL HFA/VENTOLIN HFA) 108 (90 Base) MCG/ACT inhaler Inhale 2 puffs into the lungs every 6 hours as needed for shortness of breath / dyspnea or wheezing 8.5 g 3     albuterol (PROVENTIL) (2.5 MG/3ML) 0.083% neb solution Take 1 vial (2.5 mg) by nebulization every 6 hours as needed for shortness of breath / dyspnea or wheezing 12 mL 4     ARIPiprazole (ABILIFY) 2 MG tablet Take 1 tablet (2 mg) by mouth daily 30 tablet 3     aspirin (ASA) 81 MG chewable tablet Take 1 tablet (81 mg) by mouth daily       budesonide-formoterol (SYMBICORT) 160-4.5 MCG/ACT Inhaler Inhale 2 puffs into the lungs 2 times daily 10.2 g 3     dabigatran ANTICOAGULANT (PRADAXA) 150 MG capsule Take 1 capsule (150 mg) by mouth 2 times daily Store in original 's bottle or blister pack; use within 120 days of opening. 60 capsule 0     diclofenac (VOLTAREN) 1 % topical gel Apply 4 g topically 4 times daily as needed for moderate pain Apply to back, legs, and arms for pain (Patient not taking: Reported on 10/28/2021)  150 g 3     diphenhydrAMINE (BENADRYL) 25 MG capsule Take 1-2 capsules (25-50 mg) by mouth nightly as needed for sleep 60 capsule 3     DULoxetine (CYMBALTA) 30 MG capsule Take 1 capsule (30 mg) by mouth daily for 7 days, THEN 1 capsule (30 mg) 2 times daily. 187 capsule 0     EPINEPHrine (ANY BX GENERIC EQUIV) 0.3 MG/0.3ML injection 2-pack Inject 0.3 mLs (0.3 mg) into the muscle as needed for anaphylaxis 1 each 1     Hydroxyurea 1000 MG TABS Take 2,000 mg by mouth daily 60 tablet 3     hydrOXYzine (ATARAX) 25 MG tablet Take 1 tablet (25 mg) by mouth 3 times daily as needed for anxiety 30 tablet 1     JADENU 360 MG tablet Take 4 tablets (1,440 mg) by mouth every evening 120 tablet 4     lidocaine-prilocaine (EMLA) 2.5-2.5 % external cream Apply topically once for 1 dose Use for port site as needed 30 g 1     medroxyPROGESTERone (DEPO-PROVERA) 150 MG/ML IM injection Inject 150 mg into the muscle       morphine (MS CONTIN) 15 MG CR tablet Take 1 tablet (15 mg) by mouth every 12 hours Take 1/2 tablet every 12 hours initially 60 tablet 0     naloxone (NARCAN) 4 MG/0.1ML nasal spray Spray 1 spray (4 mg) into one nostril alternating nostrils once as needed for opioid reversal every 2-3 minutes until assistance arrives 0.2 mL 1     omeprazole (PRILOSEC) 20 MG DR capsule Take 1 capsule (20 mg) by mouth daily 30 capsule 1     ondansetron (ZOFRAN) 8 MG tablet Take 1 tablet (8 mg) by mouth every 8 hours as needed 30 tablet 1     oxyCODONE IR (ROXICODONE) 15 MG tablet Take 1 tablet (15mg) by mouth every 4-6 hours as needed for severe pain. Goal 4 per day. Max 6 per day. 50 tablet 0       Past Medical History  Past Medical History:   Diagnosis Date     Anxiety      Bleeding disorder (H)      Blood clotting disorder (H)      Cerebral infarction (H) 2015     Cognitive developmental delay     low IQ. Please recognize when managing pain and planning with her     Depressive disorder      Hemiplegia and hemiparesis following cerebral  infarction affecting right dominant side (H)     right hand contractures     Iron overload due to repeated red blood cell transfusions      Migraines      Multiple subsegmental pulmonary emboli without acute cor pulmonale (H) 02/01/2021     Oppositional defiant behavior      Superficial venous thrombosis of arm, right 03/25/2021     Uncomplicated asthma      Past Surgical History:   Procedure Laterality Date     AS INSERT TUNNELED CV 2 CATH W/O PORT/PUMP       C BREAST REDUCTION (INCLUDES LIPO) TIER 3 Bilateral 04/23/2019     CHOLECYSTECTOMY       INSERT PORT VASCULAR ACCESS Left 4/21/2021    Procedure: INSERTION, VASCULAR ACCESS PORT (NOT SURE ON SIDE UNTIL REMOVAL);  Surgeon: Rajan More MD;  Location: [facility] OR     IR CHEST PORT PLACEMENT > 5 YRS OF AGE  4/21/2021     IR CVC NON TUNNEL LINE REMOVAL  5/6/2021     IR CVC NON TUNNEL PLACEMENT  04/07/2020     IR CVC NON TUNNEL PLACEMENT  4/30/2021     IR PORT REMOVAL LEFT  4/21/2021     REMOVE PORT VASCULAR ACCESS Left 4/21/2021    Procedure: REMOVAL, VASCULAR ACCESS PORT LEFT;  Surgeon: Rajan More MD;  Location: [facility] OR     REPAIR TENDON ELBOW Right 10/02/2019    Procedure: Right Elbow Flexor Lengthening, Flexor Pronator Slide Of Wrist and Finger, Thumb Adductor Lengthening;  Surgeon: Anai Franco MD;  Location: [facility] OR     TONSILLECTOMY Bilateral 10/02/2019    Procedure: Bilateral Tonsillectomy;  Surgeon: Farhana Guy MD;  Location: [facility] OR     Allergies   Allergen Reactions     Contrast Dye      Hives and breathing issues     Fish-Derived Products Hives     Seafood Hives     Diagnostic X-Ray Materials      Gadolinium      Social History   Social History     Tobacco Use     Smoking status: Never Smoker     Smokeless tobacco: Never Used   Substance Use Topics     Alcohol use: Not Currently     Alcohol/week: 0.0 standard drinks     Drug use: Never      Past medical history and social history were reviewed.    Physical Examination:  There were  no vitals taken for this visit.  Wt Readings from Last 10 Encounters:   10/19/21 77 kg (169 lb 11.2 oz)   10/13/21 77.2 kg (170 lb 4.8 oz)   09/29/21 77.1 kg (170 lb)   09/22/21 77.7 kg (171 lb 3.2 oz)   09/20/21 78 kg (172 lb)   09/17/21 78 kg (172 lb)   09/15/21 78 kg (172 lb)   09/12/21 77.1 kg (170 lb)   09/11/21 77.1 kg (170 lb)   09/07/21 77.1 kg (170 lb)     Video physical exam  General: Patient appears well in no acute distress.   Skin: No visualized rash or lesions on visualized skin  Eyes: EOMI, no erythema, sclera icterus or discharge noted  Resp: Appears to be breathing comfortably without accessory muscle usage, speaking in full sentences, no cough  MSK: Appears to have normal range of motion based on visualized movements  Neurologic: No apparent tremors, facial movements symmetric  Psych: affect normal, alert and oriented    The rest of a comprehensive physical examination is deferred due to PHE (public health emergency) video restrictions    Laboratory Data:  Results for HIMANSHU AL (MRN [id number]) as of 11/1/2021 13:28   11/1/2021 12:28   Sodium 137   Potassium 3.8   Chloride 111 (H)   Carbon Dioxide 20   Urea Nitrogen 6 (L)   Creatinine 0.55   GFR Estimate >90   Calcium 8.7   Anion Gap 6   Albumin 4.3   Protein Total 8.2   Bilirubin Total 4.3 (H)   Alkaline Phosphatase 83   ALT 61 (H)   AST 89 (H)   Glucose 90   WBC 12.4 (H)   Hemoglobin 8.1 (L)   Hematocrit 22.3 (L)   Platelet Count 266   RBC Count 2.40 (L)   MCV 93   MCH 33.8 (H)   MCHC 36.3   RDW 25.0 (H)   % Neutrophils 67   % Lymphocytes 21   % Monocytes 9   % Eosinophils 2   % Basophils 1   Absolute Basophils 0.1   NRBC/W 15 (H)   Absolute Neutrophil 8.3   Absolute Lymphocytes 2.6   Absolute Monocytes 1.1   Absolute Eosinophils 0.2   Absolute NRBCs 1.9 (H)   RBC Morphology Confirmed RBC Indices   Platelet Morphology Automated Count Confirmed. Platelet morphology is normal.   Polychromasia Moderate (A)   Sickle Cells Marked (A)   Target  Cells Slight (A)   % Retic 19.2 (H)   Absolute Retic 0.480 (H)         Assessment and Plan:  1. Sickle Cell Disease  HgbSS complicated by hx stroke, acute chest, iron overload, chronic pain, acute on chronic pulmonary embolism with multiple hospitalizations. She has been doing better recently with no ED visits, though she is in the infusion center almost daily. For now no changes to plan, though will need to strategize ways to lessen infusion room utilization such as scheduling 2-3x per week. Will discuss with infusion team and Dr. Duncan.      Labs: Hgb stsable. WBC slightly elevated but at baseline. Retic improved. CMP at baseline with mild LFT /bili elevation. Overall no concerns.      Meds: Stopped Voxeletor. Continue Hydrea 2000mg daily. Contineu Jadenu 4 tablets daily. She is now on Crizanlizumab monthly last given 10/19-due 11/16. Continue Desferal every other weekend.      Pain Control: Continue MSContin 15mg BID. Continue Oxycodone to 15mg PRN max 6 per day-#50 tabs per week. DO NOT INCREASE. Continue Cymbalta 30mg BID. Continue Tylenol PRN.      Follow-up: Continue ANDREAS or MD monthly, sooner if issues arise.      2. Neuro  History of stroke. Continue ASA.     Dr. Pierce doing botox for spasticity and symptoms improving.      Migraines, chronic issue, saw neurology. Will follow-up with them on recs.      3. Psych  History of anxiety and depression. Doing well with Cymbalta and Abilify.      Follows with outside therapist.     OK to use Benadryl PRN insomnia.      4. Pulm  History of asthma, continue Symbicort and Albuterol PRN. Still needs to see pulm, not discussed.     Acute on chronic PE and DVT, failure of apixaban and rivaroxaban despite therapeutic levels. Now on Dabigitran + ASA. Admits to more ORTEGA over last 1-2 months. Will have nursing check O2 sats in clinic. If low may need to repeat VQ scan and echo (prior concern for pulm HTN). She wonders about blood transfusion for this, will check hgb as  above.    Spoke with nursing: O2 sats WNL at rest and exertion. HR is higher. Will check echo but hold off on repeat VQ scan. Hgb better so no need for blood transfusion. I suspect some aspect of deconditioning as well as she tells me she spends almost all day in her room every day.     60 minutes spent on the date of the encounter doing chart review, review of test results, patient visit, documentation and discussion with other provider(s)     Andrei Machado PA-C  Department of Hematology and Oncology  [facility]         Again, thank you for allowing me to participate in the care of your patient.        Sincerely,        RONALDO Allan

## 2021-11-01 NOTE — TELEPHONE ENCOUNTER
"[facility] Telephone Triage Note    The following symptoms were reported:   Typical  Description:            Onset:  Last week Friday  Location: \"all over\"  Character: Sharp           Intensity: 9/10    Accompanying Signs & Symptoms:  none    Chest Pain:  denies     Shortness of Breath:  denies     Fever:  denies     Chills:  denies   Cough/sore throat:  denies  Other:  Is scheduled for virtual visit with Andrei Patiño at 9:00am.     Therapies Tried and outcome: Oxycodone 1tab, MSContin 1 tab, and Advil 2tablets around 6:00am.     Improved by: heat and hot shower with minimal effect    The following provider was consulted: 7:23am Andrei HIGGINS approved for IVF/Pain/Labs after 9:00am virtual visit with Andrei Patiño.       The following advice/orders were given, and/or interventions recommended:    Needs transportation  9:09am Abdullahi FRYE paged for transportation needs    Patient instructions and/or follow up:    Called and relayed information to Pt, who verbalized understanding that needs to attend the 9:00am virtual visit with Andrei with 12:00pm labs and 12:30pm IVF/Pain    Scheduling notified       "

## 2021-11-01 NOTE — LETTER
11/1/2021         RE: Jennifer Cervantes  [address]  [facility] [address]        Dear Colleague,    Thank you for referring your patient, Jennifer Cervantes, to the [facility]. Please see a copy of my visit note below.    Jennifer is a 21 year old who is being evaluated via a billable video visit.      How would you like to obtain your AVS? MyChart  If the video visit is dropped, the invitation should be resent by: Text to cell phone: [phone]  Will anyone else be joining your video visit? 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      She was admitted 2/1/21-2/3/21 with a new PE, started on Rivaroxaban. Switched to Eliquis 3/25/21 with RUE DVT.     She was admitted 4/26/21-5/11/21 with sickle cell pain crisis complicated by worsening PE in setting of low Apixaban levels, acute hypoxic respiratory failure, pneumonia, acute chest syndrome s/p exchange transfusion on 4/30 and 5/4. After 2nd exchange her oxygen requirement dramatically improved from 20L to 1-2L 5/5 and she was off oxygen as of 5/6.      She was admitted 7/13/21-7/25/21 for acute on chronic PE, switched to dabigitran + ASA.     She has been doing better recently. Her pain plan was changed to no IV opioids in ED and she has not been in ED for 6 weeks.     She was called today for hematology follow-up.     Interval History:  Jennifer was called today for follow-up. She overall is doing well though does note that the colder  weather has worsened her pain-mainly back, arms, and legs. The leg pain makes it hard to walk at times. She is managing with her home meds but notes she needs the infusion especially after the weekend. She also notes ongoing ORTEGA. This has been going on for a month or two. She gets winded with most activities and it is really difficult to do stairs. She denies chest pain, SOB at rest, fevers, cough, leg swelling. She tells me she is taking her Pradaxa and ASA daily with no missed doses.     She denies any GI concerns other than occasional nausea, managed with Zofran. She still gets migraines and is waiting on a plan for neurology for this. She is back on Desferal, started last weekend. Last Jr was 10/19.     Current Outpatient Medications   Medication Sig Dispense Refill     acetaminophen (TYLENOL) 325 MG tablet Take 2 tablets (650 mg) by mouth every 6 hours as needed for mild pain 120 tablet 3     albuterol (PROAIR HFA/PROVENTIL HFA/VENTOLIN HFA) 108 (90 Base) MCG/ACT inhaler Inhale 2 puffs into the lungs every 6 hours as needed for shortness of breath / dyspnea or wheezing 8.5 g 3     albuterol (PROVENTIL) (2.5 MG/3ML) 0.083% neb solution Take 1 vial (2.5 mg) by nebulization every 6 hours as needed for shortness of breath / dyspnea or wheezing 12 mL 4     ARIPiprazole (ABILIFY) 2 MG tablet Take 1 tablet (2 mg) by mouth daily 30 tablet 3     aspirin (ASA) 81 MG chewable tablet Take 1 tablet (81 mg) by mouth daily       budesonide-formoterol (SYMBICORT) 160-4.5 MCG/ACT Inhaler Inhale 2 puffs into the lungs 2 times daily 10.2 g 3     dabigatran ANTICOAGULANT (PRADAXA) 150 MG capsule Take 1 capsule (150 mg) by mouth 2 times daily Store in original 's bottle or blister pack; use within 120 days of opening. 60 capsule 0     diclofenac (VOLTAREN) 1 % topical gel Apply 4 g topically 4 times daily as needed for moderate pain Apply to back, legs, and arms for pain (Patient not taking: Reported on 10/28/2021)  150 g 3     diphenhydrAMINE (BENADRYL) 25 MG capsule Take 1-2 capsules (25-50 mg) by mouth nightly as needed for sleep 60 capsule 3     DULoxetine (CYMBALTA) 30 MG capsule Take 1 capsule (30 mg) by mouth daily for 7 days, THEN 1 capsule (30 mg) 2 times daily. 187 capsule 0     EPINEPHrine (ANY BX GENERIC EQUIV) 0.3 MG/0.3ML injection 2-pack Inject 0.3 mLs (0.3 mg) into the muscle as needed for anaphylaxis 1 each 1     Hydroxyurea 1000 MG TABS Take 2,000 mg by mouth daily 60 tablet 3     hydrOXYzine (ATARAX) 25 MG tablet Take 1 tablet (25 mg) by mouth 3 times daily as needed for anxiety 30 tablet 1     JADENU 360 MG tablet Take 4 tablets (1,440 mg) by mouth every evening 120 tablet 4     lidocaine-prilocaine (EMLA) 2.5-2.5 % external cream Apply topically once for 1 dose Use for port site as needed 30 g 1     medroxyPROGESTERone (DEPO-PROVERA) 150 MG/ML IM injection Inject 150 mg into the muscle       morphine (MS CONTIN) 15 MG CR tablet Take 1 tablet (15 mg) by mouth every 12 hours Take 1/2 tablet every 12 hours initially 60 tablet 0     naloxone (NARCAN) 4 MG/0.1ML nasal spray Spray 1 spray (4 mg) into one nostril alternating nostrils once as needed for opioid reversal every 2-3 minutes until assistance arrives 0.2 mL 1     omeprazole (PRILOSEC) 20 MG DR capsule Take 1 capsule (20 mg) by mouth daily 30 capsule 1     ondansetron (ZOFRAN) 8 MG tablet Take 1 tablet (8 mg) by mouth every 8 hours as needed 30 tablet 1     oxyCODONE IR (ROXICODONE) 15 MG tablet Take 1 tablet (15mg) by mouth every 4-6 hours as needed for severe pain. Goal 4 per day. Max 6 per day. 50 tablet 0       Past Medical History  Past Medical History:   Diagnosis Date     Anxiety      Bleeding disorder (H)      Blood clotting disorder (H)      Cerebral infarction (H) 2015     Cognitive developmental delay     low IQ. Please recognize when managing pain and planning with her     Depressive disorder      Hemiplegia and hemiparesis following cerebral  infarction affecting right dominant side (H)     right hand contractures     Iron overload due to repeated red blood cell transfusions      Migraines      Multiple subsegmental pulmonary emboli without acute cor pulmonale (H) 02/01/2021     Oppositional defiant behavior      Superficial venous thrombosis of arm, right 03/25/2021     Uncomplicated asthma      Past Surgical History:   Procedure Laterality Date     AS INSERT TUNNELED CV 2 CATH W/O PORT/PUMP       C BREAST REDUCTION (INCLUDES LIPO) TIER 3 Bilateral 04/23/2019     CHOLECYSTECTOMY       INSERT PORT VASCULAR ACCESS Left 4/21/2021    Procedure: INSERTION, VASCULAR ACCESS PORT (NOT SURE ON SIDE UNTIL REMOVAL);  Surgeon: Rajan More MD;  Location: [facility] OR     IR CHEST PORT PLACEMENT > 5 YRS OF AGE  4/21/2021     IR CVC NON TUNNEL LINE REMOVAL  5/6/2021     IR CVC NON TUNNEL PLACEMENT  04/07/2020     IR CVC NON TUNNEL PLACEMENT  4/30/2021     IR PORT REMOVAL LEFT  4/21/2021     REMOVE PORT VASCULAR ACCESS Left 4/21/2021    Procedure: REMOVAL, VASCULAR ACCESS PORT LEFT;  Surgeon: Rajan More MD;  Location: [facility] OR     REPAIR TENDON ELBOW Right 10/02/2019    Procedure: Right Elbow Flexor Lengthening, Flexor Pronator Slide Of Wrist and Finger, Thumb Adductor Lengthening;  Surgeon: Anai Franco MD;  Location: [facility] OR     TONSILLECTOMY Bilateral 10/02/2019    Procedure: Bilateral Tonsillectomy;  Surgeon: Farhana Guy MD;  Location: [facility] OR     Allergies   Allergen Reactions     Contrast Dye      Hives and breathing issues     Fish-Derived Products Hives     Seafood Hives     Diagnostic X-Ray Materials      Gadolinium      Social History   Social History     Tobacco Use     Smoking status: Never Smoker     Smokeless tobacco: Never Used   Substance Use Topics     Alcohol use: Not Currently     Alcohol/week: 0.0 standard drinks     Drug use: Never      Past medical history and social history were reviewed.    Physical Examination:  There were  no vitals taken for this visit.  Wt Readings from Last 10 Encounters:   10/19/21 77 kg (169 lb 11.2 oz)   10/13/21 77.2 kg (170 lb 4.8 oz)   09/29/21 77.1 kg (170 lb)   09/22/21 77.7 kg (171 lb 3.2 oz)   09/20/21 78 kg (172 lb)   09/17/21 78 kg (172 lb)   09/15/21 78 kg (172 lb)   09/12/21 77.1 kg (170 lb)   09/11/21 77.1 kg (170 lb)   09/07/21 77.1 kg (170 lb)     Video physical exam  General: Patient appears well in no acute distress.   Skin: No visualized rash or lesions on visualized skin  Eyes: EOMI, no erythema, sclera icterus or discharge noted  Resp: Appears to be breathing comfortably without accessory muscle usage, speaking in full sentences, no cough  MSK: Appears to have normal range of motion based on visualized movements  Neurologic: No apparent tremors, facial movements symmetric  Psych: affect normal, alert and oriented    The rest of a comprehensive physical examination is deferred due to PHE (public health emergency) video restrictions    Laboratory Data:  Results for HIMANSHU AL (MRN [id number]) as of 11/1/2021 13:28   11/1/2021 12:28   Sodium 137   Potassium 3.8   Chloride 111 (H)   Carbon Dioxide 20   Urea Nitrogen 6 (L)   Creatinine 0.55   GFR Estimate >90   Calcium 8.7   Anion Gap 6   Albumin 4.3   Protein Total 8.2   Bilirubin Total 4.3 (H)   Alkaline Phosphatase 83   ALT 61 (H)   AST 89 (H)   Glucose 90   WBC 12.4 (H)   Hemoglobin 8.1 (L)   Hematocrit 22.3 (L)   Platelet Count 266   RBC Count 2.40 (L)   MCV 93   MCH 33.8 (H)   MCHC 36.3   RDW 25.0 (H)   % Neutrophils 67   % Lymphocytes 21   % Monocytes 9   % Eosinophils 2   % Basophils 1   Absolute Basophils 0.1   NRBC/W 15 (H)   Absolute Neutrophil 8.3   Absolute Lymphocytes 2.6   Absolute Monocytes 1.1   Absolute Eosinophils 0.2   Absolute NRBCs 1.9 (H)   RBC Morphology Confirmed RBC Indices   Platelet Morphology Automated Count Confirmed. Platelet morphology is normal.   Polychromasia Moderate (A)   Sickle Cells Marked (A)   Target  Cells Slight (A)   % Retic 19.2 (H)   Absolute Retic 0.480 (H)         Assessment and Plan:  1. Sickle Cell Disease  HgbSS complicated by hx stroke, acute chest, iron overload, chronic pain, acute on chronic pulmonary embolism with multiple hospitalizations. She has been doing better recently with no ED visits, though she is in the infusion center almost daily. For now no changes to plan, though will need to strategize ways to lessen infusion room utilization such as scheduling 2-3x per week. Will discuss with infusion team and Dr. Duncan.      Labs: Hgb stsable. WBC slightly elevated but at baseline. Retic improved. CMP at baseline with mild LFT /bili elevation. Overall no concerns.      Meds: Stopped Voxeletor. Continue Hydrea 2000mg daily. Contineu Jadenu 4 tablets daily. She is now on Crizanlizumab monthly last given 10/19-due 11/16. Continue Desferal every other weekend.      Pain Control: Continue MSContin 15mg BID. Continue Oxycodone to 15mg PRN max 6 per day-#50 tabs per week. DO NOT INCREASE. Continue Cymbalta 30mg BID. Continue Tylenol PRN.      Follow-up: Continue ANDREAS or MD monthly, sooner if issues arise.      2. Neuro  History of stroke. Continue ASA.     Dr. Pierce doing botox for spasticity and symptoms improving.      Migraines, chronic issue, saw neurology. Will follow-up with them on recs.      3. Psych  History of anxiety and depression. Doing well with Cymbalta and Abilify.      Follows with outside therapist.     OK to use Benadryl PRN insomnia.      4. Pulm  History of asthma, continue Symbicort and Albuterol PRN. Still needs to see pulm, not discussed.     Acute on chronic PE and DVT, failure of apixaban and rivaroxaban despite therapeutic levels. Now on Dabigitran + ASA. Admits to more ORTEGA over last 1-2 months. Will have nursing check O2 sats in clinic. If low may need to repeat VQ scan and echo (prior concern for pulm HTN). She wonders about blood transfusion for this, will check hgb as  above.    Spoke with nursing: O2 sats WNL at rest and exertion. HR is higher. Will check echo but hold off on repeat VQ scan. Hgb better so no need for blood transfusion. I suspect some aspect of deconditioning as well as she tells me she spends almost all day in her room every day.     60 minutes spent on the date of the encounter doing chart review, review of test results, patient visit, documentation and discussion with other provider(s)     Andrei Machado PA-C  Department of Hematology and Oncology  [facility]         Again, thank you for allowing me to participate in the care of your patient.        Sincerely,        RONALDO Allan

## 2021-11-02 ENCOUNTER — INFUSION THERAPY VISIT (OUTPATIENT)
Dept: TRANSPLANT | Facility: CLINIC | Age: 21
End: 2021-11-02
Attending: PEDIATRICS
Payer: COMMERCIAL

## 2021-11-02 ENCOUNTER — NURSE TRIAGE (OUTPATIENT)
Dept: ONCOLOGY | Facility: CLINIC | Age: 21
End: 2021-11-02

## 2021-11-02 ENCOUNTER — PATIENT OUTREACH (OUTPATIENT)
Dept: CARE COORDINATION | Facility: CLINIC | Age: 21
End: 2021-11-02

## 2021-11-02 VITALS
DIASTOLIC BLOOD PRESSURE: 88 MMHG | TEMPERATURE: 98.5 F | HEART RATE: 109 BPM | OXYGEN SATURATION: 90 % | SYSTOLIC BLOOD PRESSURE: 138 MMHG | RESPIRATION RATE: 14 BRPM

## 2021-11-02 DIAGNOSIS — D57.00 SICKLE CELL PAIN CRISIS (H): ICD-10-CM

## 2021-11-02 DIAGNOSIS — G81.10 SPASTIC HEMIPLEGIA, UNSPECIFIED ETIOLOGY, UNSPECIFIED LATERALITY (H): Primary | ICD-10-CM

## 2021-11-02 PROCEDURE — 96365 THER/PROPH/DIAG IV INF INIT: CPT

## 2021-11-02 PROCEDURE — 250N000011 HC RX IP 250 OP 636: Performed by: PEDIATRICS

## 2021-11-02 PROCEDURE — 96376 TX/PRO/DX INJ SAME DRUG ADON: CPT

## 2021-11-02 PROCEDURE — 258N000003 HC RX IP 258 OP 636: Performed by: PEDIATRICS

## 2021-11-02 PROCEDURE — 96375 TX/PRO/DX INJ NEW DRUG ADDON: CPT

## 2021-11-02 RX ORDER — HEPARIN SODIUM,PORCINE 10 UNIT/ML
5 VIAL (ML) INTRAVENOUS
Status: CANCELLED | OUTPATIENT
Start: 2022-01-01

## 2021-11-02 RX ORDER — MORPHINE SULFATE 2 MG/ML
2 INJECTION, SOLUTION INTRAMUSCULAR; INTRAVENOUS
Status: COMPLETED | OUTPATIENT
Start: 2021-11-02 | End: 2021-11-02

## 2021-11-02 RX ORDER — HEPARIN SODIUM (PORCINE) LOCK FLUSH IV SOLN 100 UNIT/ML 100 UNIT/ML
5 SOLUTION INTRAVENOUS
Status: CANCELLED | OUTPATIENT
Start: 2022-01-01

## 2021-11-02 RX ORDER — ONDANSETRON 8 MG/1
8 TABLET, FILM COATED ORAL
Status: CANCELLED
Start: 2022-01-01

## 2021-11-02 RX ORDER — MORPHINE SULFATE 2 MG/ML
2 INJECTION, SOLUTION INTRAMUSCULAR; INTRAVENOUS
Status: CANCELLED
Start: 2022-01-01

## 2021-11-02 RX ORDER — DIPHENHYDRAMINE HCL 25 MG
25 CAPSULE ORAL
Status: CANCELLED
Start: 2022-01-01

## 2021-11-02 RX ORDER — HEPARIN SODIUM (PORCINE) LOCK FLUSH IV SOLN 100 UNIT/ML 100 UNIT/ML
5 SOLUTION INTRAVENOUS DAILY PRN
Status: DISCONTINUED | OUTPATIENT
Start: 2021-11-02 | End: 2021-11-02 | Stop reason: HOSPADM

## 2021-11-02 RX ADMIN — DEXTROSE AND SODIUM CHLORIDE 500 ML: 5; 450 INJECTION, SOLUTION INTRAVENOUS at 12:36

## 2021-11-02 RX ADMIN — MORPHINE SULFATE 2 MG: 2 INJECTION, SOLUTION INTRAMUSCULAR; INTRAVENOUS at 14:31

## 2021-11-02 RX ADMIN — MORPHINE SULFATE 2 MG: 2 INJECTION, SOLUTION INTRAMUSCULAR; INTRAVENOUS at 13:39

## 2021-11-02 RX ADMIN — MORPHINE SULFATE 2 MG: 2 INJECTION, SOLUTION INTRAMUSCULAR; INTRAVENOUS at 12:36

## 2021-11-02 ASSESSMENT — PAIN SCALES - GENERAL: PAINLEVEL: EXTREME PAIN (8)

## 2021-11-02 NOTE — TELEPHONE ENCOUNTER
[facility] Triage    Incoming Call: Sickle Cell    Pt called in to triage requesting IVF pain meds for lower back pain rated 8/10 x few hours days. Stated last took oxy, ms contin, tyl at 6 am this morning without relief. Denied any fevers, chills, cough, sob, chest pain or other symptoms. Pt's last infusion was 11/1/21, last clinic visit 11/1 with Andrei with no follow-up at this time. Patient states they do not have own ride and it will take 60 min to get to cancer clinic. Pt denied being out of home medications and needing any refills today. Pt qualifies for sickle cell standing order protocol and had labs yesterday.    Routing to Singh Roth

## 2021-11-02 NOTE — LETTER
11/2/2021         RE: Jennifer Cervantes  [address]  [facility] [address]        Dear Colleague,    Thank you for referring your patient, Jennifer Cervantes, to the [facility] BLOOD AND MARROW TRANSPLANT PROGRAM [place]. Please see a copy of my visit note below.    Infusion Nursing Note:  Jennifer Cervantes presents today for add-on infusion.    Patient seen by provider today: No   present during visit today: Not Applicable.    Note: Patient reports low back pain rated 8/10 starting this morning. States pain is sharp. She took oxycodone and tylenol around 6AM without relief.   Denies Chest pain, SOB, N/V, weakness, fever.     Received IV fluids as scheduled. Morphine 2  mg IVP given every 1 hour x 3 doses. Reports pain improvements following last dose rated 6/10.    Intravenous Access:  Implanted Port.    Treatment Conditions:  Not Applicable.      Post Infusion Assessment:  Patient tolerated infusion without incident.       Discharge Plan:   Patient discharged in stable condition accompanied by: self.  Departure Mode: Ambulatory.      Jennifer Lai RN                          Again, thank you for allowing me to participate in the care of your patient.        Sincerely,        [facility]

## 2021-11-02 NOTE — CONFIDENTIAL NOTE
Received Ok that Jennifer can come in to BMT for infusion at 1:00 pm.     Message sent to  to schedule.     Paged  for assistance with transportation.

## 2021-11-02 NOTE — PROGRESS NOTES
Infusion Nursing Note:  Jennifer Cervantes presents today for add-on infusion.    Patient seen by provider today: No   present during visit today: Not Applicable.    Note: Patient reports low back pain rated 8/10 starting this morning. States pain is sharp. She took oxycodone and tylenol around 6AM without relief.   Denies Chest pain, SOB, N/V, weakness, fever.     Received IV fluids as scheduled. Morphine 2  mg IVP given every 1 hour x 3 doses. Reports pain improvements following last dose rated 6/10.    Intravenous Access:  Implanted Port.    Treatment Conditions:  Not Applicable.      Post Infusion Assessment:  Patient tolerated infusion without incident.       Discharge Plan:   Patient discharged in stable condition accompanied by: self.  Departure Mode: Ambulatory.      Jennifer Lai RN

## 2021-11-02 NOTE — PROGRESS NOTES
Social Work Follow-Up  [facility]    Data/Intervention:  Patient Name:  Jennifer Cervantes  /Age:  2000 (21 year old)    Reason for Follow-Up:  Transportation    Collaborated With:    -[employer]  -Patient    Intervention/Education/Resources Provided:  Patient has an appointment today at 1 pm and is needing assistance with transportation. SW called patients insurance and ride was arranged with [employer] ([phone]). SW called patient and informed them of their ride detail and encouraged to call SW if there are any issues with their transportation.    Assessment/Plan:  SW will remain available as needed.  Previously provided patient/family with writer's contact information and availability.       CARLOS Chavez,LGSW  Hematology/Oncology Social Worker  Phone:[phone] Pager: [phone]

## 2021-11-03 ENCOUNTER — HOME INFUSION (PRE-WILLOW HOME INFUSION) (OUTPATIENT)
Dept: PHARMACY | Facility: CLINIC | Age: 21
End: 2021-11-03

## 2021-11-03 ENCOUNTER — TELEPHONE (OUTPATIENT)
Dept: ONCOLOGY | Facility: CLINIC | Age: 21
End: 2021-11-03

## 2021-11-03 ENCOUNTER — INFUSION THERAPY VISIT (OUTPATIENT)
Dept: TRANSPLANT | Facility: CLINIC | Age: 21
End: 2021-11-03
Attending: PEDIATRICS
Payer: COMMERCIAL

## 2021-11-03 ENCOUNTER — PATIENT OUTREACH (OUTPATIENT)
Dept: CARE COORDINATION | Facility: CLINIC | Age: 21
End: 2021-11-03

## 2021-11-03 VITALS
RESPIRATION RATE: 20 BRPM | SYSTOLIC BLOOD PRESSURE: 136 MMHG | HEART RATE: 108 BPM | TEMPERATURE: 98.8 F | DIASTOLIC BLOOD PRESSURE: 85 MMHG | BODY MASS INDEX: 28.7 KG/M2 | WEIGHT: 167.2 LBS | OXYGEN SATURATION: 92 %

## 2021-11-03 DIAGNOSIS — G81.10 SPASTIC HEMIPLEGIA, UNSPECIFIED ETIOLOGY, UNSPECIFIED LATERALITY (H): Primary | ICD-10-CM

## 2021-11-03 DIAGNOSIS — D57.00 SICKLE CELL PAIN CRISIS (H): ICD-10-CM

## 2021-11-03 PROCEDURE — 96374 THER/PROPH/DIAG INJ IV PUSH: CPT

## 2021-11-03 PROCEDURE — 250N000011 HC RX IP 250 OP 636: Performed by: PEDIATRICS

## 2021-11-03 PROCEDURE — 258N000003 HC RX IP 258 OP 636: Performed by: PEDIATRICS

## 2021-11-03 PROCEDURE — 96361 HYDRATE IV INFUSION ADD-ON: CPT

## 2021-11-03 RX ORDER — DIPHENHYDRAMINE HCL 25 MG
25 CAPSULE ORAL
Status: CANCELLED
Start: 2022-01-01

## 2021-11-03 RX ORDER — MORPHINE SULFATE 2 MG/ML
2 INJECTION, SOLUTION INTRAMUSCULAR; INTRAVENOUS
Status: COMPLETED | OUTPATIENT
Start: 2021-11-03 | End: 2021-11-03

## 2021-11-03 RX ORDER — ONDANSETRON 8 MG/1
8 TABLET, FILM COATED ORAL
Status: CANCELLED
Start: 2022-01-01

## 2021-11-03 RX ORDER — MORPHINE SULFATE 2 MG/ML
2 INJECTION, SOLUTION INTRAMUSCULAR; INTRAVENOUS
Status: CANCELLED
Start: 2022-01-01

## 2021-11-03 RX ORDER — HEPARIN SODIUM (PORCINE) LOCK FLUSH IV SOLN 100 UNIT/ML 100 UNIT/ML
5 SOLUTION INTRAVENOUS
Status: CANCELLED | OUTPATIENT
Start: 2022-01-01

## 2021-11-03 RX ORDER — HEPARIN SODIUM,PORCINE 10 UNIT/ML
5 VIAL (ML) INTRAVENOUS
Status: CANCELLED | OUTPATIENT
Start: 2022-01-01

## 2021-11-03 RX ORDER — HEPARIN SODIUM (PORCINE) LOCK FLUSH IV SOLN 100 UNIT/ML 100 UNIT/ML
5 SOLUTION INTRAVENOUS EVERY 8 HOURS
Status: DISCONTINUED | OUTPATIENT
Start: 2021-11-03 | End: 2021-11-03 | Stop reason: HOSPADM

## 2021-11-03 RX ADMIN — MORPHINE SULFATE 2 MG: 2 INJECTION, SOLUTION INTRAMUSCULAR; INTRAVENOUS at 12:49

## 2021-11-03 RX ADMIN — SODIUM CHLORIDE, PRESERVATIVE FREE 5 ML: 5 INJECTION INTRAVENOUS at 16:32

## 2021-11-03 RX ADMIN — DEXTROSE AND SODIUM CHLORIDE 500 ML: 5; 450 INJECTION, SOLUTION INTRAVENOUS at 12:44

## 2021-11-03 RX ADMIN — MORPHINE SULFATE 2 MG: 2 INJECTION, SOLUTION INTRAMUSCULAR; INTRAVENOUS at 14:45

## 2021-11-03 RX ADMIN — MORPHINE SULFATE 2 MG: 2 INJECTION, SOLUTION INTRAMUSCULAR; INTRAVENOUS at 13:46

## 2021-11-03 ASSESSMENT — PAIN SCALES - GENERAL: PAINLEVEL: EXTREME PAIN (8)

## 2021-11-03 NOTE — PROGRESS NOTES
Infusion Nursing Note:  Jennifer Cervantes presents today for pain meds and fluids.    Patient seen by provider today: No   present during visit today: Not Applicable.    Note: VSS. Pt c/o generalized pain all over. Pt rates pain 8/10 hoping to reduce pain to 5/10. Port accessed. IV fluids started. Pt declined anti-emetics. Morphing 2mg IV administered x3. Pain down to 5/10 due to interventions. Pt chose to receive remaining 500cc fluids.       Intravenous Access:  Implanted Port.    Treatment Conditions:  Results reviewed, labs MET treatment parameters, ok to proceed with treatment.      Post Infusion Assessment:  Patient tolerated infusion without incident.  Access discontinued per protocol.       Discharge Plan:   Patient discharged in stable condition accompanied by: self.  Departure Mode: Ambulatory.      Yao Cordero RN

## 2021-11-03 NOTE — LETTER
11/3/2021         RE: Jennifer Cervantes  [address]  [facility] [address]        Dear Colleague,    Thank you for referring your patient, Jennifer Cervantes, to the [facility] BLOOD AND MARROW TRANSPLANT PROGRAM [place]. Please see a copy of my visit note below.    Infusion Nursing Note:  Jennifer Cervantes presents today for pain meds and fluids.    Patient seen by provider today: No   present during visit today: Not Applicable.    Note: VSS. Pt c/o generalized pain all over. Pt rates pain 8/10 hoping to reduce pain to 5/10. Port accessed. IV fluids started. Pt declined anti-emetics. Morphing 2mg IV administered x3. Pain down to 5/10 due to interventions. Pt chose to receive remaining 500cc fluids.       Intravenous Access:  Implanted Port.    Treatment Conditions:  Results reviewed, labs MET treatment parameters, ok to proceed with treatment.      Post Infusion Assessment:  Patient tolerated infusion without incident.  Access discontinued per protocol.       Discharge Plan:   Patient discharged in stable condition accompanied by: self.  Departure Mode: Ambulatory.      Yao Cordero RN                          Again, thank you for allowing me to participate in the care of your patient.        Sincerely,        [facility]

## 2021-11-03 NOTE — TELEPHONE ENCOUNTER
[facility] Telephone Triage Note    The following symptoms were reported:   Typical  Description:            Onset:  Midnight last night  Location: sides of the abdomen and back  Character: Sharp           Intensity: 9/10    Accompanying Signs & Symptoms:  none    Chest Pain:  denies     Shortness of Breath:  denies     Fever:  denies     Chills:  denies   Cough/sore throat:  denies  Other:  denies    Therapies Tried and outcome:Last meds taken 6:00am including, oxycodone 1 tablet, MS CONTIN 15mg, Tylenol    Needs transportation    Improved by: Minimal improvement with heat/hot shower    The following provider was consulted:  Paged 7:09am Andrei HIGGINS  8:01am approved for IVF/Pain and ECHO Complete waiting to be scheduled.     The following advice/orders were given, and/or interventions recommended:    Appt available at 1:30pm for IVF/Pain.     Scheduling notified, updated plan 12:30pm IVF/Pain infusion to accommodate for 2:00p Echo Complete scheduled for today.    9:10am Abdullahi FRYE notified for transportation and will call pt with update to schedule today.       Patient instructions and/or follow up:   Called and relayed information to Pt, who verbalized understanding and aware that infusion schedule may change pending ECHO Complete dx test.

## 2021-11-03 NOTE — PROGRESS NOTES
Social Work Follow-Up  [facility]    Data/Intervention:  Patient Name:  Jennifer Cervantes  /Age:  2000 (21 year old)    Reason for Follow-Up:  Transportation    Collaborated With:    -[employer] [facility]  -Patient    Intervention/Education/Resources Provided:  Patient has their IVF/pain at 12:30 pm and their Echo Complete at 2:00 pm. SW called patient's insurance and ride was arranged with [employer] ([phone]). SW called patient and informed them of their appointments and ride information. Patient verbalized understanding.    Assessment/Plan:  SW will remain available as needed.  Previously provided patient/family with writer's contact information and availability.       CARLOS Chavez,LGSW  Hematology/Oncology Social Worker  Phone:[phone] Pager: [phone]

## 2021-11-04 ENCOUNTER — TELEPHONE (OUTPATIENT)
Dept: ONCOLOGY | Facility: CLINIC | Age: 21
End: 2021-11-04

## 2021-11-04 ENCOUNTER — PATIENT OUTREACH (OUTPATIENT)
Dept: CARE COORDINATION | Facility: CLINIC | Age: 21
End: 2021-11-04

## 2021-11-04 ENCOUNTER — INFUSION THERAPY VISIT (OUTPATIENT)
Dept: ONCOLOGY | Facility: CLINIC | Age: 21
End: 2021-11-04
Attending: PEDIATRICS
Payer: COMMERCIAL

## 2021-11-04 VITALS
DIASTOLIC BLOOD PRESSURE: 69 MMHG | SYSTOLIC BLOOD PRESSURE: 119 MMHG | RESPIRATION RATE: 20 BRPM | OXYGEN SATURATION: 91 % | HEART RATE: 106 BPM | TEMPERATURE: 98 F

## 2021-11-04 DIAGNOSIS — G81.10 SPASTIC HEMIPLEGIA, UNSPECIFIED ETIOLOGY, UNSPECIFIED LATERALITY (H): Primary | ICD-10-CM

## 2021-11-04 DIAGNOSIS — D57.00 SICKLE CELL PAIN CRISIS (H): ICD-10-CM

## 2021-11-04 PROCEDURE — 250N000011 HC RX IP 250 OP 636: Performed by: PEDIATRICS

## 2021-11-04 PROCEDURE — 96361 HYDRATE IV INFUSION ADD-ON: CPT

## 2021-11-04 PROCEDURE — 258N000003 HC RX IP 258 OP 636: Performed by: PEDIATRICS

## 2021-11-04 PROCEDURE — 96374 THER/PROPH/DIAG INJ IV PUSH: CPT

## 2021-11-04 PROCEDURE — 96376 TX/PRO/DX INJ SAME DRUG ADON: CPT

## 2021-11-04 RX ORDER — MORPHINE SULFATE 2 MG/ML
2 INJECTION, SOLUTION INTRAMUSCULAR; INTRAVENOUS
Status: COMPLETED | OUTPATIENT
Start: 2021-11-04 | End: 2021-11-04

## 2021-11-04 RX ORDER — DIPHENHYDRAMINE HCL 25 MG
25 CAPSULE ORAL
Status: CANCELLED
Start: 2022-01-01

## 2021-11-04 RX ORDER — HEPARIN SODIUM (PORCINE) LOCK FLUSH IV SOLN 100 UNIT/ML 100 UNIT/ML
5 SOLUTION INTRAVENOUS
Status: DISCONTINUED | OUTPATIENT
Start: 2021-11-04 | End: 2021-11-04 | Stop reason: HOSPADM

## 2021-11-04 RX ORDER — ONDANSETRON 8 MG/1
8 TABLET, FILM COATED ORAL
Status: CANCELLED
Start: 2022-01-01

## 2021-11-04 RX ORDER — HEPARIN SODIUM (PORCINE) LOCK FLUSH IV SOLN 100 UNIT/ML 100 UNIT/ML
5 SOLUTION INTRAVENOUS
Status: CANCELLED | OUTPATIENT
Start: 2022-01-01

## 2021-11-04 RX ORDER — HEPARIN SODIUM,PORCINE 10 UNIT/ML
5 VIAL (ML) INTRAVENOUS
Status: CANCELLED | OUTPATIENT
Start: 2022-01-01

## 2021-11-04 RX ORDER — MORPHINE SULFATE 2 MG/ML
2 INJECTION, SOLUTION INTRAMUSCULAR; INTRAVENOUS
Status: CANCELLED
Start: 2022-01-01

## 2021-11-04 RX ADMIN — MORPHINE SULFATE 2 MG: 2 INJECTION, SOLUTION INTRAMUSCULAR; INTRAVENOUS at 12:25

## 2021-11-04 RX ADMIN — MORPHINE SULFATE 2 MG: 2 INJECTION, SOLUTION INTRAMUSCULAR; INTRAVENOUS at 11:24

## 2021-11-04 RX ADMIN — Medication 5 ML: at 14:16

## 2021-11-04 RX ADMIN — DEXTROSE AND SODIUM CHLORIDE 500 ML: 5; 450 INJECTION, SOLUTION INTRAVENOUS at 11:20

## 2021-11-04 RX ADMIN — MORPHINE SULFATE 2 MG: 2 INJECTION, SOLUTION INTRAMUSCULAR; INTRAVENOUS at 13:34

## 2021-11-04 ASSESSMENT — PAIN SCALES - GENERAL: PAINLEVEL: WORST PAIN (10)

## 2021-11-04 NOTE — PROGRESS NOTES
Infusion Nursing Note:  Jennifer Cervantes presents today for IV fluids and pain medication.    Patient seen by provider today: No   present during visit today: Not Applicable.    Note:   Patient arrives to infusion rating her pain 10/10 in her lower back.  Patient denies fevers, cough, or shortness of breath.  Denies signs of infection such as burning with urination and diarrhea.      Patient states her pain goal is 5/10.  At end of infusion and 3 doses of Morphine patient rated her pain 6/10 and stated she was ready to discharge.      Intravenous Access:  Implanted Port.    Treatment Conditions:  No labs ordered today.      Post Infusion Assessment:  Patient tolerated infusion without incident.  Blood return noted pre and post infusion.  Site patent and intact, free from redness, edema or discomfort.  No evidence of extravasations.  Access discontinued per protocol.       Discharge Plan:   Patient declined prescription refills.  Discharge instructions reviewed with: Patient.  Patient and/or family verbalized understanding of discharge instructions and all questions answered.  Copy of AVS reviewed with patient and/or family.  Patient will return 11/10/21 for next visit with ANDREAS and 11/16/21 for next appointment in infusion.  Patient discharged in stable condition accompanied by: self.  Departure Mode: Ambulatory.      Criss Carmichael RN

## 2021-11-04 NOTE — PATIENT INSTRUCTIONS
Contact Numbers  [facility]: [phone] (for symptom and scheduling needs)    Please call the [facility] Triage line if you experience a temperature greater than or equal to 100.4, shaking chills, have uncontrolled nausea, vomiting and/or diarrhea, dizziness, shortness of breath, chest pain, bleeding, unexplained bruising, or if you have any other new/concerning symptoms, questions or concerns.     If you are having any concerning symptoms or wish to speak to a provider before your next infusion visit, please call your care coordinator or triage to notify them so we can adequately serve you.     If you need a refill on a narcotic prescription or other medication, please call triage before your infusion appointment.

## 2021-11-04 NOTE — TELEPHONE ENCOUNTER
[facility] Telephone Triage Note     The following symptoms were reported:   Typical  Description:            Onset:  around 5:00am this morning  Location: sides of the abdomen and back  Character: Sharp           Intensity: 10/10     Accompanying Signs & Symptoms:  none               Chest Pain:  denies     Shortness of Breath:  denies     Fever:  denies     Chills:  denies   Cough/sore throat:  denies  Other:  denies     Therapies Tried and outcome:Last meds taken 5:30am including, oxycodone 1 tablet, MS CONTIN 15mg, Tylenol     Needs transportation     Improved by: Minimal improvement with heat/hot shower     The following provider was consulted:    Routed to Dr. Duncan  8:36am approved for IVF/Pain, No labs needed.      The following advice/orders were given, and/or interventions recommended:      9:13am appt available for 11:00am per Maryse DIAZ who reached out to pt to confirm that 11 am will work for Jennifer Cervantes. I will call SW for transportation.

## 2021-11-05 ENCOUNTER — PATIENT OUTREACH (OUTPATIENT)
Dept: CARE COORDINATION | Facility: CLINIC | Age: 21
End: 2021-11-05

## 2021-11-05 ENCOUNTER — INFUSION THERAPY VISIT (OUTPATIENT)
Dept: ONCOLOGY | Facility: CLINIC | Age: 21
End: 2021-11-05
Attending: OBSTETRICS & GYNECOLOGY
Payer: COMMERCIAL

## 2021-11-05 ENCOUNTER — TELEPHONE (OUTPATIENT)
Dept: ONCOLOGY | Facility: CLINIC | Age: 21
End: 2021-11-05

## 2021-11-05 VITALS
DIASTOLIC BLOOD PRESSURE: 81 MMHG | RESPIRATION RATE: 18 BRPM | OXYGEN SATURATION: 90 % | HEART RATE: 112 BPM | SYSTOLIC BLOOD PRESSURE: 131 MMHG | TEMPERATURE: 98 F

## 2021-11-05 DIAGNOSIS — D57.00 SICKLE CELL PAIN CRISIS (H): ICD-10-CM

## 2021-11-05 DIAGNOSIS — G81.10 SPASTIC HEMIPLEGIA, UNSPECIFIED ETIOLOGY, UNSPECIFIED LATERALITY (H): Primary | ICD-10-CM

## 2021-11-05 PROCEDURE — 250N000011 HC RX IP 250 OP 636: Performed by: PEDIATRICS

## 2021-11-05 PROCEDURE — 96374 THER/PROPH/DIAG INJ IV PUSH: CPT

## 2021-11-05 PROCEDURE — 96376 TX/PRO/DX INJ SAME DRUG ADON: CPT

## 2021-11-05 PROCEDURE — 258N000003 HC RX IP 258 OP 636: Performed by: PEDIATRICS

## 2021-11-05 PROCEDURE — 96361 HYDRATE IV INFUSION ADD-ON: CPT

## 2021-11-05 RX ORDER — HEPARIN SODIUM,PORCINE 10 UNIT/ML
5 VIAL (ML) INTRAVENOUS
Status: CANCELLED | OUTPATIENT
Start: 2022-01-01

## 2021-11-05 RX ORDER — MORPHINE SULFATE 2 MG/ML
2 INJECTION, SOLUTION INTRAMUSCULAR; INTRAVENOUS
Status: CANCELLED
Start: 2022-01-01

## 2021-11-05 RX ORDER — DIPHENHYDRAMINE HCL 25 MG
25 CAPSULE ORAL
Status: CANCELLED
Start: 2022-01-01

## 2021-11-05 RX ORDER — ONDANSETRON 8 MG/1
8 TABLET, FILM COATED ORAL
Status: CANCELLED
Start: 2022-01-01

## 2021-11-05 RX ORDER — MORPHINE SULFATE 2 MG/ML
2 INJECTION, SOLUTION INTRAMUSCULAR; INTRAVENOUS
Status: COMPLETED | OUTPATIENT
Start: 2021-11-05 | End: 2021-11-05

## 2021-11-05 RX ORDER — HEPARIN SODIUM (PORCINE) LOCK FLUSH IV SOLN 100 UNIT/ML 100 UNIT/ML
5 SOLUTION INTRAVENOUS
Status: DISCONTINUED | OUTPATIENT
Start: 2021-11-05 | End: 2021-11-05 | Stop reason: HOSPADM

## 2021-11-05 RX ORDER — HEPARIN SODIUM (PORCINE) LOCK FLUSH IV SOLN 100 UNIT/ML 100 UNIT/ML
5 SOLUTION INTRAVENOUS
Status: CANCELLED | OUTPATIENT
Start: 2022-01-01

## 2021-11-05 RX ADMIN — DEXTROSE AND SODIUM CHLORIDE 500 ML: 5; 450 INJECTION, SOLUTION INTRAVENOUS at 09:33

## 2021-11-05 RX ADMIN — MORPHINE SULFATE 2 MG: 2 INJECTION, SOLUTION INTRAMUSCULAR; INTRAVENOUS at 12:03

## 2021-11-05 RX ADMIN — Medication 5 ML: at 12:18

## 2021-11-05 RX ADMIN — MORPHINE SULFATE 2 MG: 2 INJECTION, SOLUTION INTRAMUSCULAR; INTRAVENOUS at 10:57

## 2021-11-05 RX ADMIN — MORPHINE SULFATE 2 MG: 2 INJECTION, SOLUTION INTRAMUSCULAR; INTRAVENOUS at 09:56

## 2021-11-05 ASSESSMENT — PAIN SCALES - GENERAL: PAINLEVEL: WORST PAIN (10)

## 2021-11-05 NOTE — PROGRESS NOTES
Social Work Note: Telephone Call  Oncology Clinic     Data/Intervention:  Patient Name:  Jennifer Cervantes  /Age: 2000, 21 years old     Call From: Page From Triage        Reason for Call:  Transportation     Assessment:  Due to timing SW used a taxi voucher to arrange ride for patient.  Arranged  for patient from home with [employer].  Patient will need to call when ready for return ride home ([phone]). SW encouraged patient to reach out for any issues with their ride.     Plan:  Patient is aware of the transportation plan.  available to assist with any other needs.      CARLOS Chavez,LGSW  Hematology/Oncology Social Worker  Phone:[phone] Pager: [phone]

## 2021-11-05 NOTE — CONFIDENTIAL NOTE
"Pt called in to triage requesting IVF pain meds for lower back pain rated 9/10 x for a few hours. Stated last took prn oxycodone,morphine and tylenol this morning  @ 6-6:30 without relief. Denied any fevers, chills, cough, sob, chest pain or other symptoms not typical of your sickle cell pain . Pt's last infusion was 11/4/21 which she states helped a little, last clinic visit 11/1/21 with Andrei HIGGINS  follow-up on 11/10/21 with Anrdei HIGGINS. Patient states they do not have own ride and it will take at least 60 minutes long to get to cancer clinic.   Pt denied being out of home medications and needing any refills today.    Please note, if you are late for your appt, you risk losing your infusion appt as it may delay another patient's infusion who arrived on time\".     7:20 paged Andrei HIGGINS  7:25 Ok'ed to come in for IVF/pain no labs needed, by Andrei HIGGINS    Appointment available on 11/5/21 at 9:30 am with [facility].     Call placed to Jennifer and she verified that she was able to come in at 9:30 for IVF/pain.     Message sent to  to schedule 11/5/21@ 9:30 am, IVF/pain no labs with [facility].     Page sent to social work to assist with transportation.      "

## 2021-11-05 NOTE — PATIENT INSTRUCTIONS
[facility] Triage and after hours / weekends / holidays:  [phone]    Please call the triage or after hours line if you experience a temperature greater than or equal to 100.5, shaking chills, have uncontrolled nausea, vomiting and/or diarrhea, dizziness, shortness of breath, chest pain, bleeding, unexplained bruising, or if you have any other new/concerning symptoms, questions or concerns.      If you are having any concerning symptoms or wish to speak to a provider before your next infusion visit, please call your care coordinator or triage to notify them so we can adequately serve you.     If you need a refill on a narcotic prescription or other medication, please call before your infusion appointment.                 November 2021 Sunday Monday Tuesday Wednesday Thursday Friday Saturday        1    VIDEO VISIT RETURN   9:00 AM   (60 min.)   Andrei Machado PA   [facility]    LAB CENTRAL  11:45 AM   (15 min.)   DENISE HATFIELD LAB DRAW   [facility]    ONC INFUSION 2 HR (120 MIN)  12:30 PM   (120 min.)    ONC INFUSION NURSE   [facility] 2    BMT INFUSION 2 HR (120 MIN)   1:00 PM   (120 min.)    BMT INFUSION NURSE   [facility] 3    BMT INFUSION 3 HR (180 MIN)  12:30 PM   (120 min.)   [facility] BMT INFUSION NURSE   [facility] Blood [facility] 4    ONC INFUSION 2 HR (120 MIN)  11:00 AM   (120 min.)    ONC INFUSION NURSE   [facility] 5    ONC INFUSION 2 HR (120 MIN)   9:30 AM   (120 min.)    ONC INFUSION NURSE   [facility] 6       7     8     9     10    VIDEO VISIT RETURN  11:00 AM   (60 min.)   Andrei Machado PA   [facility] 11     12     13       14     15     16    LAB CENTRAL   7:15 AM   (15 min.)   DENISE HATFIELD LAB DRAW   [facility]  [facility] Cancer [facility]    ONC INFUSION 3 HR (180 MIN)   8:00 AM   (180 min.)    ONC INFUSION NURSE   [facility] Cancer [facility] 17     18     19     20       21     22    HEARING EVALUATION   8:45 AM   (60 min.)   Isatu Wright AuD   [facility] Audiology [place]    ECHO COMPLETE  10:15 AM   (60 min.)   [address] Heart [facility] [place]    MR ABDOMEN WO   4:30 PM   (45 min.)   [email or web address]   [facility] MRI [place] 23     24    [facility] NURSE VISIT   9:00 AM   (30 min.)   Nurse, [facility] Internal Medicine [place] 25     26     27       28     29     30 December 2021 Sunday Monday Tuesday Wednesday Thursday Friday Saturday                  1    [facility] RETURN   2:40 PM   (40 min.)   Eric Alvarado MD   [facility] [place] 2     3     4       5     6     7     8     9     10     11       12     13     14     15     16     17     18       19     20    RETURN  12:15 PM   (30 min.)   Eric Duncan MD   [facility] Cancer [facility] 21     22     23     24     25       26     27     28     29     30     31                          Lab Results:  No results found for this or any previous visit (from the past 12 hour(s)).

## 2021-11-05 NOTE — PROGRESS NOTES
Infusion Nursing Note:  Jennifer Cervantes presents today for IV fluids and pain medication.    Patient seen by provider today: No   present during visit today: Not Applicable.    Note: Patient arrives rating pain at 10/10 in her lower back, has been up since 2am with pain.  D5/45NS administered along with 3 doses of morphine.  After third dose patient states her pain is a 6/10 which is tolerable for her to discharge.      Intravenous Access:  Implanted Port.    Treatment Conditions:  Not Applicable.      Post Infusion Assessment:  Patient tolerated infusion without incident.  Blood return noted pre and post infusion.  Site patent and intact, free from redness, edema or discomfort.  No evidence of extravasations.  Access discontinued per protocol.       Discharge Plan:   Patient declined prescription refills.  Copy of AVS reviewed with patient and/or family.  Patient will return 11/10 for visit with Andrei for next appointment.  Return 11/16 for sidney infusion.  Patient discharged in stable condition accompanied by: self.  Departure Mode: Ambulatory.      Sally Payne RN

## 2021-11-08 ENCOUNTER — TELEPHONE (OUTPATIENT)
Dept: ONCOLOGY | Facility: CLINIC | Age: 21
End: 2021-11-08
Payer: COMMERCIAL

## 2021-11-08 ENCOUNTER — PATIENT OUTREACH (OUTPATIENT)
Dept: CARE COORDINATION | Facility: CLINIC | Age: 21
End: 2021-11-08
Payer: COMMERCIAL

## 2021-11-08 ENCOUNTER — INFUSION THERAPY VISIT (OUTPATIENT)
Dept: ONCOLOGY | Facility: CLINIC | Age: 21
End: 2021-11-08
Attending: PEDIATRICS
Payer: COMMERCIAL

## 2021-11-08 VITALS
SYSTOLIC BLOOD PRESSURE: 134 MMHG | OXYGEN SATURATION: 90 % | HEART RATE: 105 BPM | RESPIRATION RATE: 16 BRPM | TEMPERATURE: 97.9 F | DIASTOLIC BLOOD PRESSURE: 85 MMHG

## 2021-11-08 DIAGNOSIS — G81.10 SPASTIC HEMIPLEGIA, UNSPECIFIED ETIOLOGY, UNSPECIFIED LATERALITY (H): Primary | ICD-10-CM

## 2021-11-08 DIAGNOSIS — D57.00 SICKLE CELL PAIN CRISIS (H): ICD-10-CM

## 2021-11-08 PROCEDURE — 250N000011 HC RX IP 250 OP 636: Performed by: PEDIATRICS

## 2021-11-08 PROCEDURE — 258N000003 HC RX IP 258 OP 636: Performed by: PEDIATRICS

## 2021-11-08 PROCEDURE — 96376 TX/PRO/DX INJ SAME DRUG ADON: CPT

## 2021-11-08 PROCEDURE — 96374 THER/PROPH/DIAG INJ IV PUSH: CPT

## 2021-11-08 PROCEDURE — 96361 HYDRATE IV INFUSION ADD-ON: CPT

## 2021-11-08 RX ORDER — MORPHINE SULFATE 2 MG/ML
2 INJECTION, SOLUTION INTRAMUSCULAR; INTRAVENOUS
Status: DISCONTINUED | OUTPATIENT
Start: 2021-11-08 | End: 2021-11-08 | Stop reason: HOSPADM

## 2021-11-08 RX ORDER — HEPARIN SODIUM (PORCINE) LOCK FLUSH IV SOLN 100 UNIT/ML 100 UNIT/ML
5 SOLUTION INTRAVENOUS
Status: DISCONTINUED | OUTPATIENT
Start: 2021-11-08 | End: 2021-11-08 | Stop reason: HOSPADM

## 2021-11-08 RX ORDER — DIPHENHYDRAMINE HCL 25 MG
25 CAPSULE ORAL
Status: CANCELLED
Start: 2022-01-01

## 2021-11-08 RX ORDER — HEPARIN SODIUM (PORCINE) LOCK FLUSH IV SOLN 100 UNIT/ML 100 UNIT/ML
5 SOLUTION INTRAVENOUS
Status: CANCELLED | OUTPATIENT
Start: 2022-01-01

## 2021-11-08 RX ORDER — ONDANSETRON 8 MG/1
8 TABLET, FILM COATED ORAL
Status: CANCELLED
Start: 2022-01-01

## 2021-11-08 RX ORDER — HEPARIN SODIUM,PORCINE 10 UNIT/ML
5 VIAL (ML) INTRAVENOUS
Status: CANCELLED | OUTPATIENT
Start: 2022-01-01

## 2021-11-08 RX ORDER — MORPHINE SULFATE 2 MG/ML
2 INJECTION, SOLUTION INTRAMUSCULAR; INTRAVENOUS
Status: CANCELLED
Start: 2022-01-01

## 2021-11-08 RX ADMIN — MORPHINE SULFATE 2 MG: 2 INJECTION, SOLUTION INTRAMUSCULAR; INTRAVENOUS at 11:28

## 2021-11-08 RX ADMIN — MORPHINE SULFATE 2 MG: 2 INJECTION, SOLUTION INTRAMUSCULAR; INTRAVENOUS at 09:56

## 2021-11-08 RX ADMIN — DEXTROSE AND SODIUM CHLORIDE 500 ML: 5; 450 INJECTION, SOLUTION INTRAVENOUS at 09:54

## 2021-11-08 RX ADMIN — MORPHINE SULFATE 2 MG: 2 INJECTION, SOLUTION INTRAMUSCULAR; INTRAVENOUS at 12:33

## 2021-11-08 RX ADMIN — Medication 5 ML: at 13:14

## 2021-11-08 NOTE — PROGRESS NOTES
Infusion Nursing Note:  Jennifer Cervantes presents today for IVF and Pain Medications.    Patient seen by provider today: No   present during visit today: Not Applicable.    Note: Pt arrives to infusion today feeling well except for 10/10 back pain related to her sickle cell. She denies any s/s of infection such as SOB, cough, fever, chills. Denies any chest pain, headache or dizziness.     Intravenous Access:  Implanted Port.    Treatment Conditions:  Not Applicable.    Post Infusion Assessment:  Patient tolerated infusion without incident.  Blood return noted pre and post infusion.  Site patent and intact, free from redness, edema or discomfort.  No evidence of extravasations.  Access discontinued per protocol.     Discharge Plan:   Patient declined prescription refills.  AVS given to patient.  Patient will return 11/10 for next appointment with ANDREAS.   Patient discharged in stable condition accompanied by: self.  Departure Mode: Ambulatory.  Face to Face time: 0.      Zoraida Orozco RN

## 2021-11-08 NOTE — CONFIDENTIAL NOTE
"Pt called in to triage requesting IVF pain meds for back and all over pain rated 10/10 x 1-2 days. Stated last took prn MS contin and oxycodone, muscle relaxer and tylenol this morning without relief. Denied any fevers, chills, cough, sob, chest pain or other symptoms. Pt's last infusion was 11/5/21, last clinic visit 11/1/21 with follow-up on 11/10/21 with Andrei HIGGINS. Patient states they do not have own ride and it will take 60 minutes long to get to cancer clinic.   Pt denied being out of home medications and needing any refills today.  Pt qualifies for sickle cell standing order protocol.  Please note, if you are late for your appt, you risk losing your infusion appt as it may delay another patient's infusion who arrived on time\".     Infusion appointment available on 11/8 at 9:30 am     Call placed to Jennifer who will take that appointment time     Message sent to  to schedule IVF/pain at 9:30 am     Page sent to  to please assist with ride for appointment.       "

## 2021-11-08 NOTE — PROGRESS NOTES
Jennifer is a 21 year old who is being evaluated via a billable video visit.      How would you like to obtain your AVS? MyChart  If the video visit is dropped, the invitation should be resent by: Text to cell phone: [phone]  Will anyone else be joining your video visit? No       Telephone visit: 15 minutes  >30 minutes spent discussing with RN, MD, and ED team and coordinating care.     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      She was admitted 2/1/21-2/3/21 with a new PE, started on Rivaroxaban. Switched to Eliquis 3/25/21 with RUE DVT.     She was admitted 4/26/21-5/11/21 with sickle cell pain crisis complicated by worsening PE in setting of low Apixaban levels, acute hypoxic respiratory failure, pneumonia, acute chest syndrome s/p exchange transfusion on 4/30 and 5/4. After 2nd exchange her oxygen requirement dramatically improved from 20L to 1-2L 5/5 and she was off oxygen as of 5/6.      She was admitted 7/13/21-7/25/21 for acute on chronic PE, switched to dabigitran + ASA.      She has been doing better recently. Her pain plan was changed to no IV opioids in ED and she has not been in ED for 6 weeks.      She was called today for hematology follow-up.     Interval History:  Jennifer was called today for follow-up. She continues to have ORTEGA, no SOB at rest. No chest pain, no fevers, no cough, no leg swelling, no orthopnea. Denies any missed doses of her Pradaxa. Still having back pain, pain was worse after her recent  desferal infusion. Includes her entire back. Infusions have helped. The cold weather has also contributed to her pain. No GI issues. No lightheadedness. Headaches come and go.     Current Outpatient Medications   Medication Sig Dispense Refill     acetaminophen (TYLENOL) 325 MG tablet Take 2 tablets (650 mg) by mouth every 6 hours as needed for mild pain 120 tablet 3     albuterol (PROAIR HFA/PROVENTIL HFA/VENTOLIN HFA) 108 (90 Base) MCG/ACT inhaler Inhale 2 puffs into the lungs every 6 hours as needed for shortness of breath / dyspnea or wheezing 8.5 g 3     albuterol (PROVENTIL) (2.5 MG/3ML) 0.083% neb solution Take 1 vial (2.5 mg) by nebulization every 6 hours as needed for shortness of breath / dyspnea or wheezing 12 mL 4     ARIPiprazole (ABILIFY) 2 MG tablet Take 1 tablet (2 mg) by mouth daily 30 tablet 3     aspirin (ASA) 81 MG chewable tablet Take 1 tablet (81 mg) by mouth daily 90 tablet 3     budesonide-formoterol (SYMBICORT) 160-4.5 MCG/ACT Inhaler Inhale 2 puffs into the lungs 2 times daily 10.2 g 3     dabigatran ANTICOAGULANT (PRADAXA) 150 MG capsule Take 1 capsule (150 mg) by mouth 2 times daily Store in original 's bottle or blister pack; use within 120 days of opening. 60 capsule 3     diphenhydrAMINE (BENADRYL) 25 MG capsule Take 1-2 capsules (25-50 mg) by mouth nightly as needed for sleep 60 capsule 3     DULoxetine (CYMBALTA) 30 MG capsule Take 1 capsule (30 mg) by mouth 2 times daily 60 capsule 3     EPINEPHrine (ANY BX GENERIC EQUIV) 0.3 MG/0.3ML injection 2-pack Inject 0.3 mLs (0.3 mg) into the muscle as needed for anaphylaxis 1 each 1     Hydroxyurea 1000 MG TABS Take 2,000 mg by mouth daily 60 tablet 3     hydrOXYzine (ATARAX) 25 MG tablet Take 1 tablet (25 mg) by mouth 3 times daily as needed for anxiety 30 tablet 1     JADENU 360 MG tablet Take 4 tablets (1,440 mg) by mouth every evening 120 tablet 4     lidocaine-prilocaine (EMLA) 2.5-2.5 % external cream Apply topically once  for 1 dose Use for port site as needed 30 g 1     medroxyPROGESTERone (DEPO-PROVERA) 150 MG/ML IM injection Inject 150 mg into the muscle       morphine (MS CONTIN) 15 MG CR tablet Take 1 tablet (15 mg) by mouth every 12 hours Take 1/2 tablet every 12 hours initially 60 tablet 0     naloxone (NARCAN) 4 MG/0.1ML nasal spray Spray 1 spray (4 mg) into one nostril alternating nostrils once as needed for opioid reversal every 2-3 minutes until assistance arrives 0.2 mL 1     omeprazole (PRILOSEC) 20 MG DR capsule Take 1 capsule (20 mg) by mouth daily 30 capsule 1     ondansetron (ZOFRAN) 8 MG tablet Take 1 tablet (8 mg) by mouth every 8 hours as needed 30 tablet 1     oxyCODONE IR (ROXICODONE) 15 MG tablet Take 1 tablet (15mg) by mouth every 4-6 hours as needed for severe pain. Goal 4 per day. Max 6 per day. 50 tablet 0       Past Medical History  Past Medical History:   Diagnosis Date     Anxiety      Bleeding disorder (H)      Blood clotting disorder (H)      Cerebral infarction (H) 2015     Cognitive developmental delay     low IQ. 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 (H)     right hand contractures     Iron overload due to repeated red blood cell transfusions      Migraines      Multiple subsegmental pulmonary emboli without acute cor pulmonale (H) 02/01/2021     Oppositional defiant behavior      Superficial venous thrombosis of arm, right 03/25/2021     Uncomplicated asthma      Past Surgical History:   Procedure Laterality Date     AS INSERT TUNNELED CV 2 CATH W/O PORT/PUMP       C BREAST REDUCTION (INCLUDES LIPO) TIER 3 Bilateral 04/23/2019     CHOLECYSTECTOMY       INSERT PORT VASCULAR ACCESS Left 4/21/2021    Procedure: INSERTION, VASCULAR ACCESS PORT (NOT SURE ON SIDE UNTIL REMOVAL);  Surgeon: Rajan More MD;  Location: [facility] OR     IR CHEST PORT PLACEMENT > 5 YRS OF AGE   4/21/2021     IR CVC NON TUNNEL LINE REMOVAL  5/6/2021     IR CVC NON TUNNEL PLACEMENT  04/07/2020     IR CVC NON TUNNEL PLACEMENT  4/30/2021     IR PORT REMOVAL LEFT  4/21/2021     REMOVE PORT VASCULAR ACCESS Left 4/21/2021    Procedure: REMOVAL, VASCULAR ACCESS PORT LEFT;  Surgeon: Rajan More MD;  Location: [facility] OR     REPAIR TENDON ELBOW Right 10/02/2019    Procedure: Right Elbow Flexor Lengthening, Flexor Pronator Slide Of Wrist and Finger, Thumb Adductor Lengthening;  Surgeon: Anai Franco MD;  Location: [facility] OR     TONSILLECTOMY Bilateral 10/02/2019    Procedure: Bilateral Tonsillectomy;  Surgeon: Farhana Guy MD;  Location: [facility] OR     Allergies   Allergen Reactions     Contrast Dye      Hives and breathing issues     Fish-Derived Products Hives     Seafood Hives     Diagnostic X-Ray Materials      Gadolinium      Social History   Social History     Tobacco Use     Smoking status: Never Smoker     Smokeless tobacco: Never Used   Substance Use Topics     Alcohol use: Not Currently     Alcohol/week: 0.0 standard drinks     Drug use: Never      Past medical history and social history were reviewed.    Physical Examination:  There were no vitals taken for this visit.  Wt Readings from Last 10 Encounters:   11/03/21 75.8 kg (167 lb 3.2 oz)   11/01/21 75.8 kg (167 lb)   10/19/21 77 kg (169 lb 11.2 oz)   10/13/21 77.2 kg (170 lb 4.8 oz)   09/29/21 77.1 kg (170 lb)   09/22/21 77.7 kg (171 lb 3.2 oz)   09/20/21 78 kg (172 lb)   09/17/21 78 kg (172 lb)   09/15/21 78 kg (172 lb)   09/12/21 77.1 kg (170 lb)     Unable to do physical exam 2/2 telephone visit. Well sounding in no distress. Normal speech and thought process. Good voice quality. No audible wheezing or cough.    O2 sats from 11/10/21 Infusion  O2 at rest on RA: 90%  O2 with exertion on RA: 82%  O2 at rest on 2L via NC: 93%  O2 with exertion on 2L via NC: 92%    Laboratory Data:  Results for HIMANSHU AL (MRN [id number]) as of  11/10/2021 15:37   11/10/2021 11:09   Sodium 138   Potassium 3.8   Chloride 112 (H)   Carbon Dioxide 19 (L)   Urea Nitrogen 12   Creatinine 0.49 (L)   GFR Estimate >90   Calcium 8.3 (L)   Anion Gap 7   Albumin 3.9   Protein Total 7.8   Bilirubin Total 3.8 (H)   Alkaline Phosphatase 84   ALT 94 (H)   AST See Comment   N-Terminal Pro Bnp 31   Troponin I <0.015   Glucose 113 (H)   WBC 13.4 (H)   Hemoglobin 8.0 (L)   Hematocrit 21.0 (L)   Platelet Count 286   RBC Count 2.35 (L)   MCV 89   MCH 34.0 (H)   MCHC 37.5 (H)   RDW 25.4 (H)   % Neutrophils 69   % Lymphocytes 19   % Monocytes 6   % Eosinophils 5   % Basophils 1   Absolute Basophils 0.1   NRBC/W 23 (H)   Absolute Neutrophil 9.2 (H)   Absolute Lymphocytes 2.5   Absolute Monocytes 0.8   Absolute Eosinophils 0.7   Absolute NRBCs 3.1 (H)   RBC Morphology Confirmed RBC Indices   Platelet Morphology Automated Count Confirmed. Platelet morphology is normal.   Polychromasia Moderate (A)   Sickle Cells Marked (A)   Target Cells Slight (A)   % Retic 21.8 (H)   Absolute Retic 0.512 (H)   D-Dimer Quantitative 8.20 (H)     CXR 11/10/21  Findings:   Frontal and lateral view x-ray of the chest. Left chest wall  Port-A-Cath with tip projecting over the superior cavoatrial junction.  No acute osseous abnormality. No acute airspace opacity. No  appreciable pneumothorax or pleural effusion. Visualized upper abdomen  is within normal limits. Cardiomediastinal silhouette is within normal  limits.                                                                      Impression: No acute airspace opacity.     PIPPA LUNDBERG MD       Assessment and Plan:  1. Sickle Cell Disease  HgbSS complicated by hx stroke, acute chest, iron overload, chronic pain, acute on chronic pulmonary embolism with multiple hospitalizations. She has been doing better recently with no ED visits, though she is in the infusion center almost daily. For now no changes to plan, though will need to strategize ways to  lessen infusion room utilization such as scheduling 2-3x per week. Given acute hypoxia concerns today did not discuss.      Labs: Hgb stsable. WBC slightly elevated but at baseline. Retic slightly elevated. CMP at baseline with mild LFT /bili elevation. Also checked NT-BNP and troponin, negative. D-Dimer more elevated-see below.     Meds: Stopped Voxeletor. Continue Hydrea 2000mg daily. Contineu Jadenu 4 tablets daily. She is now on Crizanlizumab monthly last given 10/19-due 11/16. Continue Desferal every other weekend.      Pain Control: Continue MSContin 15mg BID. Continue Oxycodone to 15mg PRN max 6 per day-#50 tabs per week. DO NOT INCREASE. Continue Cymbalta 30mg BID. Continue Tylenol PRN.      Follow-up: Continue ANDREAS or MD monthly     2. Neuro  History of stroke. Continue ASA.     Dr. Pierce doing botox for spasticity and symptoms improving.      Migraines, chronic issue, saw neurology. Has follow-up with specialist scheduled in Jan     3. Psych  History of anxiety and depression. Doing well with Cymbalta and Abilify.      Follows with outside therapist.     OK to use Benadryl PRN insomnia.      4. Pulm  History of asthma, continue Symbicort and Albuterol PRN. Still needs to see pulm, not discussed.     Acute on chronic PE and DVT, failure of apixaban and rivaroxaban despite therapeutic levels. Now on Dabigitran + ASA.     Worsening dyspnea x 4 weeks. Last week sats were >90%. Now she is desating to 82% on RA. CXR unrevealing. Hgb OK. Echo scheduled, though NT-BNP is WNL.     Will get VQ scan to r/o worsening DVT in setting of further increased D-dimer.    Did ask [facility] to arrange for home O2.    Addendum:    Did do VQ scan that is concerning for new PE. Patient will go to ED now to be admitted for management. Heme team aware. Called ED with report. Pending pain in ED while waiting for bed can do 1 time dose of IV morphine but then should try to stick to previously agreed upon pain plan.     Andrei Machado  HANNY  Department of Hematology and Oncology  [facility]

## 2021-11-08 NOTE — PROGRESS NOTES
Social Work Note: Telephone Call  Oncology Clinic     Data/Intervention:  Patient Name:  Jennifer Cervantes  /Age: 2000, 21 years old     Call From: Page from Triage RN        Reason for Call:  Transportation     Assessment:   called [employer] and used a taxi voucher to arrange ride.  Arranged  for patient from home with a will call return ride.  Patient will need to call when ready for return ride home ([phone]).      Plan:  Patient is aware of the transportation plan.  available to assist with any other needs.      CARLOS Chavez,[facility]  Hematology/Oncology Social Worker  Phone:[phone] Pager: [phone]

## 2021-11-09 ENCOUNTER — TELEPHONE (OUTPATIENT)
Dept: ONCOLOGY | Facility: CLINIC | Age: 21
End: 2021-11-09
Payer: COMMERCIAL

## 2021-11-09 ENCOUNTER — PATIENT OUTREACH (OUTPATIENT)
Dept: CARE COORDINATION | Facility: CLINIC | Age: 21
End: 2021-11-09
Payer: COMMERCIAL

## 2021-11-09 ENCOUNTER — INFUSION THERAPY VISIT (OUTPATIENT)
Dept: TRANSPLANT | Facility: CLINIC | Age: 21
End: 2021-11-09
Attending: PEDIATRICS
Payer: COMMERCIAL

## 2021-11-09 VITALS
HEART RATE: 108 BPM | OXYGEN SATURATION: 95 % | RESPIRATION RATE: 18 BRPM | TEMPERATURE: 98.6 F | DIASTOLIC BLOOD PRESSURE: 87 MMHG | SYSTOLIC BLOOD PRESSURE: 135 MMHG

## 2021-11-09 DIAGNOSIS — G81.10 SPASTIC HEMIPLEGIA, UNSPECIFIED ETIOLOGY, UNSPECIFIED LATERALITY (H): Primary | ICD-10-CM

## 2021-11-09 DIAGNOSIS — D57.00 SICKLE CELL PAIN CRISIS (H): ICD-10-CM

## 2021-11-09 PROCEDURE — 258N000003 HC RX IP 258 OP 636: Performed by: PEDIATRICS

## 2021-11-09 PROCEDURE — 96376 TX/PRO/DX INJ SAME DRUG ADON: CPT

## 2021-11-09 PROCEDURE — 96374 THER/PROPH/DIAG INJ IV PUSH: CPT

## 2021-11-09 PROCEDURE — 250N000011 HC RX IP 250 OP 636: Performed by: PEDIATRICS

## 2021-11-09 PROCEDURE — 96361 HYDRATE IV INFUSION ADD-ON: CPT

## 2021-11-09 RX ORDER — HEPARIN SODIUM,PORCINE 10 UNIT/ML
5 VIAL (ML) INTRAVENOUS
Status: CANCELLED | OUTPATIENT
Start: 2022-01-01

## 2021-11-09 RX ORDER — DIPHENHYDRAMINE HCL 25 MG
25 CAPSULE ORAL
Status: CANCELLED
Start: 2022-01-01

## 2021-11-09 RX ORDER — ONDANSETRON 8 MG/1
8 TABLET, FILM COATED ORAL
Status: CANCELLED
Start: 2022-01-01

## 2021-11-09 RX ORDER — MORPHINE SULFATE 2 MG/ML
2 INJECTION, SOLUTION INTRAMUSCULAR; INTRAVENOUS
Status: DISCONTINUED | OUTPATIENT
Start: 2021-11-09 | End: 2021-11-09 | Stop reason: HOSPADM

## 2021-11-09 RX ORDER — HEPARIN SODIUM (PORCINE) LOCK FLUSH IV SOLN 100 UNIT/ML 100 UNIT/ML
5 SOLUTION INTRAVENOUS
Status: CANCELLED | OUTPATIENT
Start: 2022-01-01

## 2021-11-09 RX ORDER — HEPARIN SODIUM (PORCINE) LOCK FLUSH IV SOLN 100 UNIT/ML 100 UNIT/ML
5 SOLUTION INTRAVENOUS
Status: DISCONTINUED | OUTPATIENT
Start: 2021-11-09 | End: 2021-11-09 | Stop reason: HOSPADM

## 2021-11-09 RX ORDER — MORPHINE SULFATE 2 MG/ML
2 INJECTION, SOLUTION INTRAMUSCULAR; INTRAVENOUS
Status: CANCELLED
Start: 2022-01-01

## 2021-11-09 RX ADMIN — MORPHINE SULFATE 2 MG: 2 INJECTION, SOLUTION INTRAMUSCULAR; INTRAVENOUS at 11:12

## 2021-11-09 RX ADMIN — MORPHINE SULFATE 2 MG: 2 INJECTION, SOLUTION INTRAMUSCULAR; INTRAVENOUS at 12:20

## 2021-11-09 RX ADMIN — DEXTROSE AND SODIUM CHLORIDE 500 ML: 5; 450 INJECTION, SOLUTION INTRAVENOUS at 11:12

## 2021-11-09 RX ADMIN — MORPHINE SULFATE 2 MG: 2 INJECTION, SOLUTION INTRAMUSCULAR; INTRAVENOUS at 13:32

## 2021-11-09 ASSESSMENT — PAIN SCALES - GENERAL: PAINLEVEL: EXTREME PAIN (9)

## 2021-11-09 NOTE — PROGRESS NOTES
Social Work Note: Telephone Call  Oncology Clinic     Data/Intervention:  Patient Name:  Jennifer Cervantes DOB/Age: 2000, 21 years old     Call From: Page from Triage         Reason for Call:  Transportation     Assessment:   called [employer] to arrange ride through patient's insurance. [employer] arranged  for patient from home with [employer] taxi ([phone]). Patient will need to call when ready for return ride home.      Plan:  Patient is aware of the transportation plan.  available to assist with any other needs.      CARLOS Chavez,UDAY  Hematology/Oncology Social Worker  Phone:[phone] Pager: [phone]

## 2021-11-09 NOTE — PROGRESS NOTES
Infusion Nursing Note:  Jennifer Cervantes presents today for add on IVF and pain meds.    Patient seen by provider today: No   present during visit today: Not Applicable.    Note: Jennifer presents today for add on IVF and pain meds for sickle cell crisis. Reporting 9/10 pain to low back, sharp in nature. Jennifer received 500mL D5/12NS bolus and 2mg IV morphine x3. On discharge, she reports improved pain to 5/10 which is tolerable to Jennifer.      Intravenous Access:  Implanted Port.    Treatment Conditions:  Not Applicable. No labs were drawn today.      Post Infusion Assessment:  Patient tolerated infusion without incident.  Blood return noted pre and post infusion.  Site patent and intact, free from redness, edema or discomfort.       Discharge Plan:   Patient discharged in stable condition accompanied by: self.  Departure Mode: Ambulatory.      Allison Bowman RN

## 2021-11-09 NOTE — CONFIDENTIAL NOTE
Pt called in to triage requesting IVF pain meds for back and all over pain rated 9/10 x 1days. Stated last took prn at 6 am  MS contin and oxycodone, muscle relaxer and tylenol this morning with minimal relief. Denied any fevers, chills, cough, sob, chest pain or other symptoms. Pt's last infusion was 11/8/21, last clinic visit 11/1/21 with follow-up on 11/10/21 with Andrei HIGGISN. Patient states they do not have own ride and it will take 60 minutes long to get to cancer clinic.   Pt denied being out of home medications and needing any refills today.  Pt qualifies for sickle cell standing order protocol.  Please note, if you are late for your appt, you risk losing your infusion appt as it may delay another patient's infusion who arrived on time.     There is an 11:00 am availability in BMT     Call placed to Jennifer and she will take the 11:00 am IVF/Pain appointment     Message sent to  to schedule appointment.     Paged  to assist in scheduling a ride.

## 2021-11-09 NOTE — LETTER
11/9/2021         RE: Jennifer Cervantes  [address]  [facility] [address]        Dear Colleague,    Thank you for referring your patient, Jennifer Cervantes, to the [facility] BLOOD AND MARROW TRANSPLANT PROGRAM [place]. Please see a copy of my visit note below.    Infusion Nursing Note:  Jennifer Cervantes presents today for add on IVF and pain meds.    Patient seen by provider today: No   present during visit today: Not Applicable.    Note: Jennifer presents today for add on IVF and pain meds for sickle cell crisis. Reporting 9/10 pain to low back, sharp in nature. Jennifer received 500mL D5/12NS bolus and 2mg IV morphine x3. On discharge, she reports improved pain to 5/10 which is tolerable to Jennifer.      Intravenous Access:  Implanted Port.    Treatment Conditions:  Not Applicable. No labs were drawn today.      Post Infusion Assessment:  Patient tolerated infusion without incident.  Blood return noted pre and post infusion.  Site patent and intact, free from redness, edema or discomfort.       Discharge Plan:   Patient discharged in stable condition accompanied by: self.  Departure Mode: Ambulatory.      Allison Bowman RN                        Again, thank you for allowing me to participate in the care of your patient.        Sincerely,        [facility]

## 2021-11-10 ENCOUNTER — HOSPITAL ENCOUNTER (OUTPATIENT)
Dept: NUCLEAR MEDICINE | Facility: CLINIC | Age: 21
Setting detail: NUCLEAR MEDICINE
Discharge: HOME | DRG: 175 | End: 2021-11-10
Attending: PHYSICIAN ASSISTANT | Admitting: PHYSICIAN ASSISTANT
Payer: COMMERCIAL

## 2021-11-10 ENCOUNTER — HOSPITAL ENCOUNTER (INPATIENT)
Facility: CLINIC | Age: 21
LOS: 11 days | Discharge: HOME | DRG: 175 | End: 2021-11-21
Attending: EMERGENCY MEDICINE | Admitting: INTERNAL MEDICINE
Payer: COMMERCIAL

## 2021-11-10 ENCOUNTER — INFUSION THERAPY VISIT (OUTPATIENT)
Dept: ONCOLOGY | Facility: CLINIC | Age: 21
DRG: 175 | End: 2021-11-10
Attending: INTERNAL MEDICINE
Payer: COMMERCIAL

## 2021-11-10 ENCOUNTER — PATIENT OUTREACH (OUTPATIENT)
Dept: CARE COORDINATION | Facility: CLINIC | Age: 21
End: 2021-11-10
Payer: COMMERCIAL

## 2021-11-10 ENCOUNTER — TELEPHONE (OUTPATIENT)
Dept: ONCOLOGY | Facility: CLINIC | Age: 21
End: 2021-11-10
Payer: COMMERCIAL

## 2021-11-10 ENCOUNTER — PATIENT OUTREACH (OUTPATIENT)
Dept: ONCOLOGY | Facility: CLINIC | Age: 21
End: 2021-11-10

## 2021-11-10 ENCOUNTER — ANCILLARY PROCEDURE (OUTPATIENT)
Dept: GENERAL RADIOLOGY | Facility: CLINIC | Age: 21
End: 2021-11-10
Attending: PHYSICIAN ASSISTANT
Payer: COMMERCIAL

## 2021-11-10 ENCOUNTER — VIRTUAL VISIT (OUTPATIENT)
Dept: ONCOLOGY | Facility: CLINIC | Age: 21
DRG: 175 | End: 2021-11-10
Attending: PHYSICIAN ASSISTANT
Payer: COMMERCIAL

## 2021-11-10 VITALS
RESPIRATION RATE: 18 BRPM | OXYGEN SATURATION: 92 % | DIASTOLIC BLOOD PRESSURE: 94 MMHG | HEART RATE: 120 BPM | TEMPERATURE: 98.2 F | SYSTOLIC BLOOD PRESSURE: 133 MMHG

## 2021-11-10 DIAGNOSIS — D57.00 SICKLE CELL DISEASE WITH CRISIS (H): ICD-10-CM

## 2021-11-10 DIAGNOSIS — R09.02 HYPOXIA: ICD-10-CM

## 2021-11-10 DIAGNOSIS — D57.00 SICKLE CELL PAIN CRISIS (H): ICD-10-CM

## 2021-11-10 DIAGNOSIS — I27.82 CHRONIC PULMONARY EMBOLISM WITHOUT ACUTE COR PULMONALE, UNSPECIFIED PULMONARY EMBOLISM TYPE (H): Primary | ICD-10-CM

## 2021-11-10 DIAGNOSIS — R06.00 DYSPNEA, UNSPECIFIED TYPE: ICD-10-CM

## 2021-11-10 DIAGNOSIS — R06.02 SHORTNESS OF BREATH: ICD-10-CM

## 2021-11-10 DIAGNOSIS — D57.1 HB-SS DISEASE WITHOUT CRISIS (H): ICD-10-CM

## 2021-11-10 DIAGNOSIS — G81.10 SPASTIC HEMIPLEGIA, UNSPECIFIED ETIOLOGY, UNSPECIFIED LATERALITY (H): Primary | ICD-10-CM

## 2021-11-10 DIAGNOSIS — Z79.01 LONG TERM (CURRENT) USE OF ANTICOAGULANTS: ICD-10-CM

## 2021-11-10 DIAGNOSIS — I26.99 PULMONARY EMBOLISM, OTHER, UNSPECIFIED CHRONICITY, UNSPECIFIED WHETHER ACUTE COR PULMONALE PRESENT (H): ICD-10-CM

## 2021-11-10 DIAGNOSIS — Z11.52 ENCOUNTER FOR SCREENING LABORATORY TESTING FOR SEVERE ACUTE RESPIRATORY SYNDROME CORONAVIRUS 2 (SARS-COV-2): ICD-10-CM

## 2021-11-10 DIAGNOSIS — I27.82 CHRONIC PULMONARY EMBOLISM, UNSPECIFIED PULMONARY EMBOLISM TYPE, UNSPECIFIED WHETHER ACUTE COR PULMONALE PRESENT (H): ICD-10-CM

## 2021-11-10 DIAGNOSIS — D57.00 SICKLE CELL PAIN CRISIS (H): Primary | ICD-10-CM

## 2021-11-10 LAB
ALBUMIN SERPL-MCNC: 3.8 G/DL (ref 3.4–5)
ALBUMIN SERPL-MCNC: 3.9 G/DL (ref 3.4–5)
ALP SERPL-CCNC: 79 U/L (ref 40–150)
ALP SERPL-CCNC: 84 U/L (ref 40–150)
ALT SERPL W P-5'-P-CCNC: 85 U/L (ref 0–50)
ALT SERPL W P-5'-P-CCNC: 94 U/L (ref 0–50)
ANION GAP SERPL CALCULATED.3IONS-SCNC: 7 MMOL/L (ref 3–14)
ANION GAP SERPL CALCULATED.3IONS-SCNC: 7 MMOL/L (ref 3–14)
APTT PPP: 32 SECONDS (ref 22–38)
AST SERPL W P-5'-P-CCNC: 112 U/L (ref 0–45)
AST SERPL W P-5'-P-CCNC: ABNORMAL U/L
ATRIAL RATE - MUSE: 91 BPM
BASOPHILS # BLD MANUAL: 0.1 10E3/UL (ref 0–0.2)
BASOPHILS # BLD MANUAL: 0.6 10E3/UL (ref 0–0.2)
BASOPHILS NFR BLD MANUAL: 1 %
BASOPHILS NFR BLD MANUAL: 4 %
BILIRUB SERPL-MCNC: 3.5 MG/DL (ref 0.2–1.3)
BILIRUB SERPL-MCNC: 3.8 MG/DL (ref 0.2–1.3)
BUN SERPL-MCNC: 11 MG/DL (ref 7–30)
BUN SERPL-MCNC: 12 MG/DL (ref 7–30)
CALCIUM SERPL-MCNC: 8.3 MG/DL (ref 8.5–10.1)
CALCIUM SERPL-MCNC: 8.5 MG/DL (ref 8.5–10.1)
CHLORIDE BLD-SCNC: 110 MMOL/L (ref 94–109)
CHLORIDE BLD-SCNC: 112 MMOL/L (ref 94–109)
CO2 SERPL-SCNC: 19 MMOL/L (ref 20–32)
CO2 SERPL-SCNC: 19 MMOL/L (ref 20–32)
CREAT SERPL-MCNC: 0.49 MG/DL (ref 0.52–1.04)
CREAT SERPL-MCNC: 0.49 MG/DL (ref 0.52–1.04)
D DIMER PPP FEU-MCNC: 8.2 UG/ML FEU (ref 0–0.5)
DIASTOLIC BLOOD PRESSURE - MUSE: NORMAL MMHG
EOSINOPHIL # BLD MANUAL: 0.6 10E3/UL (ref 0–0.7)
EOSINOPHIL # BLD MANUAL: 0.7 10E3/UL (ref 0–0.7)
EOSINOPHIL NFR BLD MANUAL: 4 %
EOSINOPHIL NFR BLD MANUAL: 5 %
ERYTHROCYTE [DISTWIDTH] IN BLOOD BY AUTOMATED COUNT: 25.4 % (ref 10–15)
ERYTHROCYTE [DISTWIDTH] IN BLOOD BY AUTOMATED COUNT: 26.9 % (ref 10–15)
GFR SERPL CREATININE-BSD FRML MDRD: >90 ML/MIN/1.73M2
GFR SERPL CREATININE-BSD FRML MDRD: >90 ML/MIN/1.73M2
GLUCOSE BLD-MCNC: 113 MG/DL (ref 70–99)
GLUCOSE BLD-MCNC: 95 MG/DL (ref 70–99)
HCG SERPL QL: NEGATIVE
HCT VFR BLD AUTO: 20.7 % (ref 35–47)
HCT VFR BLD AUTO: 21 % (ref 35–47)
HGB BLD-MCNC: 7.7 G/DL (ref 11.7–15.7)
HGB BLD-MCNC: 8 G/DL (ref 11.7–15.7)
HOLD SPECIMEN: NORMAL
INR PPP: 1.23 (ref 0.85–1.15)
INTERPRETATION ECG - MUSE: NORMAL
LACTATE SERPL-SCNC: 1.2 MMOL/L (ref 0.7–2)
LDH SERPL L TO P-CCNC: 547 U/L (ref 81–234)
LYMPHOCYTES # BLD MANUAL: 2.5 10E3/UL (ref 0.8–5.3)
LYMPHOCYTES # BLD MANUAL: 2.8 10E3/UL (ref 0.8–5.3)
LYMPHOCYTES NFR BLD MANUAL: 19 %
LYMPHOCYTES NFR BLD MANUAL: 20 %
MCH RBC QN AUTO: 33.9 PG (ref 26.5–33)
MCH RBC QN AUTO: 34 PG (ref 26.5–33)
MCHC RBC AUTO-ENTMCNC: 37.2 G/DL (ref 31.5–36.5)
MCHC RBC AUTO-ENTMCNC: 37.5 G/DL (ref 31.5–36.5)
MCV RBC AUTO: 89 FL (ref 78–100)
MCV RBC AUTO: 91 FL (ref 78–100)
MONOCYTES # BLD MANUAL: 0.7 10E3/UL (ref 0–1.3)
MONOCYTES # BLD MANUAL: 0.8 10E3/UL (ref 0–1.3)
MONOCYTES NFR BLD MANUAL: 5 %
MONOCYTES NFR BLD MANUAL: 6 %
MYELOCYTES # BLD MANUAL: 0.3 10E3/UL
MYELOCYTES NFR BLD MANUAL: 2 %
NEUTROPHILS # BLD MANUAL: 9 10E3/UL (ref 1.6–8.3)
NEUTROPHILS # BLD MANUAL: 9.2 10E3/UL (ref 1.6–8.3)
NEUTROPHILS NFR BLD MANUAL: 65 %
NEUTROPHILS NFR BLD MANUAL: 69 %
NRBC # BLD AUTO: 2.8 10E3/UL
NRBC # BLD AUTO: 3.1 10E3/UL
NRBC BLD MANUAL-RTO: 20 %
NRBC BLD MANUAL-RTO: 23 %
NT-PROBNP SERPL-MCNC: 31 PG/ML (ref 0–125)
NT-PROBNP SERPL-MCNC: 42 PG/ML (ref 0–450)
P AXIS - MUSE: 66 DEGREES
PLAT MORPH BLD: ABNORMAL
PLAT MORPH BLD: ABNORMAL
PLATELET # BLD AUTO: 281 10E3/UL (ref 150–450)
PLATELET # BLD AUTO: 286 10E3/UL (ref 150–450)
POLYCHROMASIA BLD QL SMEAR: ABNORMAL
POLYCHROMASIA BLD QL SMEAR: ABNORMAL
POTASSIUM BLD-SCNC: 3.3 MMOL/L (ref 3.4–5.3)
POTASSIUM BLD-SCNC: 3.8 MMOL/L (ref 3.4–5.3)
PR INTERVAL - MUSE: 152 MS
PROT SERPL-MCNC: 7.6 G/DL (ref 6.8–8.8)
PROT SERPL-MCNC: 7.8 G/DL (ref 6.8–8.8)
QRS DURATION - MUSE: 76 MS
QT - MUSE: 376 MS
QTC - MUSE: 462 MS
R AXIS - MUSE: 36 DEGREES
RADIOLOGIST FLAGS: ABNORMAL
RBC # BLD AUTO: 2.27 10E6/UL (ref 3.8–5.2)
RBC # BLD AUTO: 2.35 10E6/UL (ref 3.8–5.2)
RBC MORPH BLD: ABNORMAL
RBC MORPH BLD: ABNORMAL
RETICS # AUTO: 0.51 10E6/UL (ref 0.03–0.1)
RETICS/RBC NFR AUTO: 21.8 % (ref 0.5–2)
SARS-COV-2 RNA RESP QL NAA+PROBE: NEGATIVE
SICKLE CELLS BLD QL SMEAR: ABNORMAL
SICKLE CELLS BLD QL SMEAR: ABNORMAL
SODIUM SERPL-SCNC: 136 MMOL/L (ref 133–144)
SODIUM SERPL-SCNC: 138 MMOL/L (ref 133–144)
SYSTOLIC BLOOD PRESSURE - MUSE: NORMAL MMHG
T AXIS - MUSE: 4 DEGREES
TARGETS BLD QL SMEAR: ABNORMAL
TARGETS BLD QL SMEAR: SLIGHT
TROPONIN I SERPL-MCNC: <0.015 UG/L (ref 0–0.04)
TROPONIN I SERPL-MCNC: <0.015 UG/L (ref 0–0.04)
VENTRICULAR RATE- MUSE: 91 BPM
WBC # BLD AUTO: 13.4 10E3/UL (ref 4–11)
WBC # BLD AUTO: 13.9 10E3/UL (ref 4–11)

## 2021-11-10 PROCEDURE — 93010 ELECTROCARDIOGRAM REPORT: CPT | Performed by: EMERGENCY MEDICINE

## 2021-11-10 PROCEDURE — 96365 THER/PROPH/DIAG IV INF INIT: CPT | Performed by: EMERGENCY MEDICINE

## 2021-11-10 PROCEDURE — 258N000003 HC RX IP 258 OP 636: Performed by: PEDIATRICS

## 2021-11-10 PROCEDURE — 93005 ELECTROCARDIOGRAM TRACING: CPT | Performed by: EMERGENCY MEDICINE

## 2021-11-10 PROCEDURE — 78580 LUNG PERFUSION IMAGING: CPT | Mod: 26 | Performed by: RADIOLOGY

## 2021-11-10 PROCEDURE — 85379 FIBRIN DEGRADATION QUANT: CPT

## 2021-11-10 PROCEDURE — 99207 PR CDG-MDM COMPONENT: MEETS HIGH - UP CODED: CPT | Performed by: INTERNAL MEDICINE

## 2021-11-10 PROCEDURE — 83615 LACTATE (LD) (LDH) ENZYME: CPT

## 2021-11-10 PROCEDURE — 83880 ASSAY OF NATRIURETIC PEPTIDE: CPT | Performed by: EMERGENCY MEDICINE

## 2021-11-10 PROCEDURE — 99285 EMERGENCY DEPT VISIT HI MDM: CPT | Mod: 25 | Performed by: EMERGENCY MEDICINE

## 2021-11-10 PROCEDURE — 343N000001 HC RX 343: Performed by: PHYSICIAN ASSISTANT

## 2021-11-10 PROCEDURE — 250N000011 HC RX IP 250 OP 636: Performed by: EMERGENCY MEDICINE

## 2021-11-10 PROCEDURE — 84484 ASSAY OF TROPONIN QUANT: CPT

## 2021-11-10 PROCEDURE — A9540 TC99M MAA: HCPCS | Performed by: PHYSICIAN ASSISTANT

## 2021-11-10 PROCEDURE — 96376 TX/PRO/DX INJ SAME DRUG ADON: CPT | Performed by: EMERGENCY MEDICINE

## 2021-11-10 PROCEDURE — 96375 TX/PRO/DX INJ NEW DRUG ADDON: CPT | Performed by: EMERGENCY MEDICINE

## 2021-11-10 PROCEDURE — 36415 COLL VENOUS BLD VENIPUNCTURE: CPT | Performed by: EMERGENCY MEDICINE

## 2021-11-10 PROCEDURE — 84155 ASSAY OF PROTEIN SERUM: CPT

## 2021-11-10 PROCEDURE — C9803 HOPD COVID-19 SPEC COLLECT: HCPCS | Performed by: EMERGENCY MEDICINE

## 2021-11-10 PROCEDURE — 71046 X-RAY EXAM CHEST 2 VIEWS: CPT | Performed by: STUDENT IN AN ORGANIZED HEALTH CARE EDUCATION/TRAINING PROGRAM

## 2021-11-10 PROCEDURE — 85610 PROTHROMBIN TIME: CPT | Performed by: EMERGENCY MEDICINE

## 2021-11-10 PROCEDURE — 250N000011 HC RX IP 250 OP 636: Performed by: PHYSICIAN ASSISTANT

## 2021-11-10 PROCEDURE — 99214 OFFICE O/P EST MOD 30 MIN: CPT | Mod: GT | Performed by: PHYSICIAN ASSISTANT

## 2021-11-10 PROCEDURE — 99223 1ST HOSP IP/OBS HIGH 75: CPT | Mod: AI | Performed by: INTERNAL MEDICINE

## 2021-11-10 PROCEDURE — 84703 CHORIONIC GONADOTROPIN ASSAY: CPT | Performed by: EMERGENCY MEDICINE

## 2021-11-10 PROCEDURE — 83880 ASSAY OF NATRIURETIC PEPTIDE: CPT

## 2021-11-10 PROCEDURE — 85027 COMPLETE CBC AUTOMATED: CPT | Performed by: EMERGENCY MEDICINE

## 2021-11-10 PROCEDURE — 36415 COLL VENOUS BLD VENIPUNCTURE: CPT

## 2021-11-10 PROCEDURE — 96366 THER/PROPH/DIAG IV INF ADDON: CPT | Performed by: EMERGENCY MEDICINE

## 2021-11-10 PROCEDURE — U0003 INFECTIOUS AGENT DETECTION BY NUCLEIC ACID (DNA OR RNA); SEVERE ACUTE RESPIRATORY SYNDROME CORONAVIRUS 2 (SARS-COV-2) (CORONAVIRUS DISEASE [COVID-19]), AMPLIFIED PROBE TECHNIQUE, MAKING USE OF HIGH THROUGHPUT TECHNOLOGIES AS DESCRIBED BY CMS-2020-01-R: HCPCS | Performed by: EMERGENCY MEDICINE

## 2021-11-10 PROCEDURE — 78580 LUNG PERFUSION IMAGING: CPT

## 2021-11-10 PROCEDURE — 258N000003 HC RX IP 258 OP 636: Performed by: EMERGENCY MEDICINE

## 2021-11-10 PROCEDURE — 84155 ASSAY OF PROTEIN SERUM: CPT | Performed by: EMERGENCY MEDICINE

## 2021-11-10 PROCEDURE — 250N000011 HC RX IP 250 OP 636: Performed by: PEDIATRICS

## 2021-11-10 PROCEDURE — 85045 AUTOMATED RETICULOCYTE COUNT: CPT

## 2021-11-10 PROCEDURE — 85027 COMPLETE CBC AUTOMATED: CPT

## 2021-11-10 PROCEDURE — 96361 HYDRATE IV INFUSION ADD-ON: CPT | Performed by: EMERGENCY MEDICINE

## 2021-11-10 PROCEDURE — 120N000011 HC R&B TRANSPLANT UMMC

## 2021-11-10 PROCEDURE — 84484 ASSAY OF TROPONIN QUANT: CPT | Performed by: EMERGENCY MEDICINE

## 2021-11-10 PROCEDURE — 83605 ASSAY OF LACTIC ACID: CPT | Performed by: EMERGENCY MEDICINE

## 2021-11-10 PROCEDURE — 85730 THROMBOPLASTIN TIME PARTIAL: CPT | Performed by: EMERGENCY MEDICINE

## 2021-11-10 RX ORDER — HEPARIN SODIUM (PORCINE) LOCK FLUSH IV SOLN 100 UNIT/ML 100 UNIT/ML
500 SOLUTION INTRAVENOUS ONCE
Status: COMPLETED | OUTPATIENT
Start: 2021-11-10 | End: 2021-11-10

## 2021-11-10 RX ORDER — SODIUM CHLORIDE, SODIUM LACTATE, POTASSIUM CHLORIDE, CALCIUM CHLORIDE 600; 310; 30; 20 MG/100ML; MG/100ML; MG/100ML; MG/100ML
INJECTION, SOLUTION INTRAVENOUS ONCE
Status: COMPLETED | OUTPATIENT
Start: 2021-11-10 | End: 2021-11-10

## 2021-11-10 RX ORDER — HEPARIN SODIUM (PORCINE) LOCK FLUSH IV SOLN 100 UNIT/ML 100 UNIT/ML
5 SOLUTION INTRAVENOUS
Status: CANCELLED | OUTPATIENT
Start: 2022-01-01

## 2021-11-10 RX ORDER — HYDROXYUREA 500 MG/1
2000 CAPSULE ORAL DAILY
Status: DISCONTINUED | OUTPATIENT
Start: 2021-11-11 | End: 2021-11-21 | Stop reason: HOSPADM

## 2021-11-10 RX ORDER — SODIUM CHLORIDE, SODIUM LACTATE, POTASSIUM CHLORIDE, CALCIUM CHLORIDE 600; 310; 30; 20 MG/100ML; MG/100ML; MG/100ML; MG/100ML
INJECTION, SOLUTION INTRAVENOUS ONCE
Status: DISCONTINUED | OUTPATIENT
Start: 2021-11-11 | End: 2021-11-11

## 2021-11-10 RX ORDER — ONDANSETRON 8 MG/1
8 TABLET, FILM COATED ORAL EVERY 8 HOURS PRN
Status: DISCONTINUED | OUTPATIENT
Start: 2021-11-10 | End: 2021-11-21 | Stop reason: HOSPADM

## 2021-11-10 RX ORDER — BUDESONIDE AND FORMOTEROL FUMARATE DIHYDRATE 160; 4.5 UG/1; UG/1
2 AEROSOL RESPIRATORY (INHALATION) 2 TIMES DAILY
Status: DISCONTINUED | OUTPATIENT
Start: 2021-11-10 | End: 2021-11-10

## 2021-11-10 RX ORDER — DIPHENHYDRAMINE HCL 25 MG
25 CAPSULE ORAL
Status: CANCELLED
Start: 2022-01-01

## 2021-11-10 RX ORDER — DULOXETIN HYDROCHLORIDE 30 MG/1
30 CAPSULE, DELAYED RELEASE ORAL 2 TIMES DAILY
Status: DISCONTINUED | OUTPATIENT
Start: 2021-11-10 | End: 2021-11-21 | Stop reason: HOSPADM

## 2021-11-10 RX ORDER — MORPHINE SULFATE 2 MG/ML
2 INJECTION, SOLUTION INTRAMUSCULAR; INTRAVENOUS
Status: DISCONTINUED | OUTPATIENT
Start: 2021-11-10 | End: 2021-11-10 | Stop reason: HOSPADM

## 2021-11-10 RX ORDER — ALBUTEROL SULFATE 0.83 MG/ML
2.5 SOLUTION RESPIRATORY (INHALATION) EVERY 6 HOURS PRN
Status: DISCONTINUED | OUTPATIENT
Start: 2021-11-10 | End: 2021-11-21 | Stop reason: HOSPADM

## 2021-11-10 RX ORDER — ONDANSETRON 8 MG/1
8 TABLET, FILM COATED ORAL
Status: CANCELLED
Start: 2022-01-01

## 2021-11-10 RX ORDER — HEPARIN SODIUM (PORCINE) LOCK FLUSH IV SOLN 100 UNIT/ML 100 UNIT/ML
5 SOLUTION INTRAVENOUS
Status: DISCONTINUED | OUTPATIENT
Start: 2021-11-10 | End: 2021-11-10 | Stop reason: HOSPADM

## 2021-11-10 RX ORDER — MORPHINE SULFATE 15 MG/1
15 TABLET, FILM COATED, EXTENDED RELEASE ORAL EVERY 12 HOURS
Status: DISCONTINUED | OUTPATIENT
Start: 2021-11-10 | End: 2021-11-21 | Stop reason: HOSPADM

## 2021-11-10 RX ORDER — MORPHINE SULFATE 2 MG/ML
2 INJECTION, SOLUTION INTRAMUSCULAR; INTRAVENOUS ONCE
Status: COMPLETED | OUTPATIENT
Start: 2021-11-10 | End: 2021-11-10

## 2021-11-10 RX ORDER — EPINEPHRINE 0.3 MG/.3ML
0.3 INJECTION SUBCUTANEOUS ONCE
Status: DISCONTINUED | OUTPATIENT
Start: 2021-11-10 | End: 2021-11-10 | Stop reason: CLARIF

## 2021-11-10 RX ORDER — NALOXONE HYDROCHLORIDE 0.4 MG/ML
0.4 INJECTION, SOLUTION INTRAMUSCULAR; INTRAVENOUS; SUBCUTANEOUS
Status: DISCONTINUED | OUTPATIENT
Start: 2021-11-10 | End: 2021-11-21 | Stop reason: HOSPADM

## 2021-11-10 RX ORDER — PANTOPRAZOLE SODIUM 40 MG/1
40 TABLET, DELAYED RELEASE ORAL
Status: DISCONTINUED | OUTPATIENT
Start: 2021-11-11 | End: 2021-11-21 | Stop reason: HOSPADM

## 2021-11-10 RX ORDER — OXYCODONE HYDROCHLORIDE 15 MG/1
TABLET ORAL
Qty: 50 TABLET | Refills: 0 | Status: SHIPPED | OUTPATIENT
Start: 2021-11-10 | End: 2021-11-24

## 2021-11-10 RX ORDER — ACETAMINOPHEN 325 MG/1
650 TABLET ORAL EVERY 6 HOURS PRN
Status: DISCONTINUED | OUTPATIENT
Start: 2021-11-10 | End: 2021-11-21 | Stop reason: HOSPADM

## 2021-11-10 RX ORDER — MORPHINE SULFATE 2 MG/ML
2 INJECTION, SOLUTION INTRAMUSCULAR; INTRAVENOUS
Status: CANCELLED
Start: 2022-01-01

## 2021-11-10 RX ORDER — ALBUTEROL SULFATE 90 UG/1
2 AEROSOL, METERED RESPIRATORY (INHALATION) EVERY 6 HOURS PRN
Status: DISCONTINUED | OUTPATIENT
Start: 2021-11-10 | End: 2021-11-21 | Stop reason: HOSPADM

## 2021-11-10 RX ORDER — DEFERASIROX 360 MG/1
1440 TABLET, FILM COATED ORAL EVERY EVENING
Status: DISCONTINUED | OUTPATIENT
Start: 2021-11-10 | End: 2021-11-21 | Stop reason: HOSPADM

## 2021-11-10 RX ORDER — HEPARIN SODIUM 10000 [USP'U]/100ML
0-5000 INJECTION, SOLUTION INTRAVENOUS CONTINUOUS
Status: DISCONTINUED | OUTPATIENT
Start: 2021-11-10 | End: 2021-11-17

## 2021-11-10 RX ORDER — NALOXONE HYDROCHLORIDE 0.4 MG/ML
0.2 INJECTION, SOLUTION INTRAMUSCULAR; INTRAVENOUS; SUBCUTANEOUS
Status: DISCONTINUED | OUTPATIENT
Start: 2021-11-10 | End: 2021-11-21 | Stop reason: HOSPADM

## 2021-11-10 RX ORDER — HYDROXYZINE HYDROCHLORIDE 25 MG/1
25 TABLET, FILM COATED ORAL 3 TIMES DAILY PRN
Status: DISCONTINUED | OUTPATIENT
Start: 2021-11-10 | End: 2021-11-21 | Stop reason: HOSPADM

## 2021-11-10 RX ORDER — DIPHENHYDRAMINE HCL 25 MG
25-50 CAPSULE ORAL
Status: DISCONTINUED | OUTPATIENT
Start: 2021-11-10 | End: 2021-11-21 | Stop reason: HOSPADM

## 2021-11-10 RX ORDER — HEPARIN SODIUM,PORCINE 10 UNIT/ML
5 VIAL (ML) INTRAVENOUS
Status: CANCELLED | OUTPATIENT
Start: 2022-01-01

## 2021-11-10 RX ADMIN — MORPHINE SULFATE 2 MG: 2 INJECTION, SOLUTION INTRAMUSCULAR; INTRAVENOUS at 10:34

## 2021-11-10 RX ADMIN — Medication 500 UNITS: at 15:32

## 2021-11-10 RX ADMIN — KIT FOR THE PREPARATION OF TECHNETIUM TC 99M ALBUMIN AGGREGATED 7 MCI.: 2.5 INJECTION, POWDER, FOR SOLUTION INTRAVENOUS at 15:08

## 2021-11-10 RX ADMIN — HEPARIN SODIUM 1400 UNITS/HR: 1000 INJECTION INTRAVENOUS; SUBCUTANEOUS at 20:35

## 2021-11-10 RX ADMIN — Medication 5 ML: at 14:05

## 2021-11-10 RX ADMIN — MORPHINE SULFATE 2 MG: 2 INJECTION, SOLUTION INTRAMUSCULAR; INTRAVENOUS at 20:04

## 2021-11-10 RX ADMIN — DEXTROSE AND SODIUM CHLORIDE 500 ML: 5; 450 INJECTION, SOLUTION INTRAVENOUS at 10:24

## 2021-11-10 RX ADMIN — SODIUM CHLORIDE, POTASSIUM CHLORIDE, SODIUM LACTATE AND CALCIUM CHLORIDE: 600; 310; 30; 20 INJECTION, SOLUTION INTRAVENOUS at 19:50

## 2021-11-10 RX ADMIN — MORPHINE SULFATE 2 MG: 2 INJECTION, SOLUTION INTRAMUSCULAR; INTRAVENOUS at 12:54

## 2021-11-10 RX ADMIN — MORPHINE SULFATE 2 MG: 2 INJECTION, SOLUTION INTRAMUSCULAR; INTRAVENOUS at 11:44

## 2021-11-10 ASSESSMENT — ENCOUNTER SYMPTOMS
LIGHT-HEADEDNESS: 0
DIZZINESS: 0
NAUSEA: 0
WEAKNESS: 0
TROUBLE SWALLOWING: 0
COUGH: 0
ABDOMINAL PAIN: 0
CHILLS: 0
VOMITING: 0
FEVER: 0
SORE THROAT: 0
NUMBNESS: 0
SHORTNESS OF BREATH: 1

## 2021-11-10 ASSESSMENT — ACTIVITIES OF DAILY LIVING (ADL)
ADLS_ACUITY_SCORE: 7

## 2021-11-10 ASSESSMENT — PAIN SCALES - GENERAL: PAINLEVEL: WORST PAIN (10)

## 2021-11-10 ASSESSMENT — MIFFLIN-ST. JEOR
SCORE: 1539
SCORE: 1516.11

## 2021-11-10 NOTE — LETTER
11/10/2021         RE: Jennifer Cervantes  [address]  [facility] [address]        Dear Colleague,    Thank you for referring your patient, Jennifer Cervantes, to the [facility]. Please see a copy of my visit note below.    Jennifer is a 21 year old who is being evaluated via a billable video visit.      How would you like to obtain your AVS? MyChart  If the video visit is dropped, the invitation should be resent by: Text to cell phone: [phone]  Will anyone else be joining your video visit? No       Telephone visit: 15 minutes  >30 minutes spent discussing with RN, MD, and ED team and coordinating care.     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      She was admitted 2/1/21-2/3/21 with a new PE, started on Rivaroxaban. Switched to Eliquis 3/25/21 with RUE DVT.     She was admitted 4/26/21-5/11/21 with sickle cell pain crisis complicated by worsening PE in setting of low Apixaban levels, acute hypoxic respiratory failure, pneumonia, acute chest syndrome s/p exchange transfusion on 4/30 and 5/4. After 2nd exchange her oxygen requirement dramatically improved from 20L to 1-2L 5/5 and she was off oxygen as of 5/6.      She was admitted 7/13/21-7/25/21 for acute on chronic PE, switched to dabigitran + ASA.      She has been doing better recently. Her pain plan was changed to no IV opioids in ED and she has not been in ED for 6 weeks.      She was called today for hematology follow-up.      Interval History:  Jennifer was called today for follow-up. She continues to have ORTEGA, no SOB at rest. No chest pain, no fevers, no cough, no leg swelling, no orthopnea. Denies any missed doses of her Pradaxa. Still having back pain, pain was worse after her recent desferal infusion. Includes her entire back. Infusions have helped. The cold weather has also contributed to her pain. No GI issues. No lightheadedness. Headaches come and go.     Current Outpatient Medications   Medication Sig Dispense Refill     acetaminophen (TYLENOL) 325 MG tablet Take 2 tablets (650 mg) by mouth every 6 hours as needed for mild pain 120 tablet 3     albuterol (PROAIR HFA/PROVENTIL HFA/VENTOLIN HFA) 108 (90 Base) MCG/ACT inhaler Inhale 2 puffs into the lungs every 6 hours as needed for shortness of breath / dyspnea or wheezing 8.5 g 3     albuterol (PROVENTIL) (2.5 MG/3ML) 0.083% neb solution Take 1 vial (2.5 mg) by nebulization every 6 hours as needed for shortness of breath / dyspnea or wheezing 12 mL 4     ARIPiprazole (ABILIFY) 2 MG tablet Take 1 tablet (2 mg) by mouth daily 30 tablet 3     aspirin (ASA) 81 MG chewable tablet Take 1 tablet (81 mg) by mouth daily 90 tablet 3     budesonide-formoterol (SYMBICORT) 160-4.5 MCG/ACT Inhaler Inhale 2 puffs into the lungs 2 times daily 10.2 g 3     dabigatran ANTICOAGULANT (PRADAXA) 150 MG capsule Take 1 capsule (150 mg) by mouth 2 times daily Store in original 's bottle or blister pack; use within 120 days of opening. 60 capsule 3     diphenhydrAMINE (BENADRYL) 25 MG capsule Take 1-2 capsules (25-50 mg) by mouth nightly as needed for sleep 60 capsule 3     DULoxetine (CYMBALTA) 30 MG capsule Take 1 capsule (30 mg) by mouth 2 times daily 60 capsule 3     EPINEPHrine (ANY BX GENERIC EQUIV) 0.3 MG/0.3ML injection 2-pack Inject 0.3 mLs (0.3 mg) into the muscle as needed for anaphylaxis 1 each 1     Hydroxyurea 1000 MG TABS Take 2,000 mg by mouth daily 60 tablet 3      hydrOXYzine (ATARAX) 25 MG tablet Take 1 tablet (25 mg) by mouth 3 times daily as needed for anxiety 30 tablet 1     JADENU 360 MG tablet Take 4 tablets (1,440 mg) by mouth every evening 120 tablet 4     lidocaine-prilocaine (EMLA) 2.5-2.5 % external cream Apply topically once for 1 dose Use for port site as needed 30 g 1     medroxyPROGESTERone (DEPO-PROVERA) 150 MG/ML IM injection Inject 150 mg into the muscle       morphine (MS CONTIN) 15 MG CR tablet Take 1 tablet (15 mg) by mouth every 12 hours Take 1/2 tablet every 12 hours initially 60 tablet 0     naloxone (NARCAN) 4 MG/0.1ML nasal spray Spray 1 spray (4 mg) into one nostril alternating nostrils once as needed for opioid reversal every 2-3 minutes until assistance arrives 0.2 mL 1     omeprazole (PRILOSEC) 20 MG DR capsule Take 1 capsule (20 mg) by mouth daily 30 capsule 1     ondansetron (ZOFRAN) 8 MG tablet Take 1 tablet (8 mg) by mouth every 8 hours as needed 30 tablet 1     oxyCODONE IR (ROXICODONE) 15 MG tablet Take 1 tablet (15mg) by mouth every 4-6 hours as needed for severe pain. Goal 4 per day. Max 6 per day. 50 tablet 0       Past Medical History  Past Medical History:   Diagnosis Date     Anxiety      Bleeding disorder (H)      Blood clotting disorder (H)      Cerebral infarction (H) 2015     Cognitive developmental delay     low IQ. 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 (H)     right hand contractures     Iron overload due to repeated red blood cell transfusions      Migraines      Multiple subsegmental pulmonary emboli without acute cor pulmonale (H) 02/01/2021     Oppositional defiant behavior      Superficial venous thrombosis of arm, right 03/25/2021     Uncomplicated asthma      Past Surgical History:   Procedure Laterality Date     AS INSERT TUNNELED CV 2 CATH W/O PORT/PUMP       C BREAST REDUCTION  (INCLUDES LIPO) TIER 3 Bilateral 04/23/2019     CHOLECYSTECTOMY       INSERT PORT VASCULAR ACCESS Left 4/21/2021    Procedure: INSERTION, VASCULAR ACCESS PORT (NOT SURE ON SIDE UNTIL REMOVAL);  Surgeon: Rajan More MD;  Location: [facility] OR     IR CHEST PORT PLACEMENT > 5 YRS OF AGE  4/21/2021     IR CVC NON TUNNEL LINE REMOVAL  5/6/2021     IR CVC NON TUNNEL PLACEMENT  04/07/2020     IR CVC NON TUNNEL PLACEMENT  4/30/2021     IR PORT REMOVAL LEFT  4/21/2021     REMOVE PORT VASCULAR ACCESS Left 4/21/2021    Procedure: REMOVAL, VASCULAR ACCESS PORT LEFT;  Surgeon: Rajan More MD;  Location: [facility] OR     REPAIR TENDON ELBOW Right 10/02/2019    Procedure: Right Elbow Flexor Lengthening, Flexor Pronator Slide Of Wrist and Finger, Thumb Adductor Lengthening;  Surgeon: Anai Franco MD;  Location: [facility] OR     TONSILLECTOMY Bilateral 10/02/2019    Procedure: Bilateral Tonsillectomy;  Surgeon: Farhana Guy MD;  Location: [facility] OR     Allergies   Allergen Reactions     Contrast Dye      Hives and breathing issues     Fish-Derived Products Hives     Seafood Hives     Diagnostic X-Ray Materials      Gadolinium      Social History   Social History     Tobacco Use     Smoking status: Never Smoker     Smokeless tobacco: Never Used   Substance Use Topics     Alcohol use: Not Currently     Alcohol/week: 0.0 standard drinks     Drug use: Never      Past medical history and social history were reviewed.    Physical Examination:  There were no vitals taken for this visit.  Wt Readings from Last 10 Encounters:   11/03/21 75.8 kg (167 lb 3.2 oz)   11/01/21 75.8 kg (167 lb)   10/19/21 77 kg (169 lb 11.2 oz)   10/13/21 77.2 kg (170 lb 4.8 oz)   09/29/21 77.1 kg (170 lb)   09/22/21 77.7 kg (171 lb 3.2 oz)   09/20/21 78 kg (172 lb)   09/17/21 78 kg (172 lb)   09/15/21 78 kg (172 lb)   09/12/21 77.1 kg (170 lb)     Unable to do physical exam 2/2 telephone visit. Well sounding in no distress. Normal speech and thought  process. Good voice quality. No audible wheezing or cough.    O2 sats from 11/10/21 Infusion  O2 at rest on RA: 90%  O2 with exertion on RA: 82%  O2 at rest on 2L via NC: 93%  O2 with exertion on 2L via NC: 92%    Laboratory Data:  Results for HIMANSHU AL (MRN [id number]) as of 11/10/2021 15:37   11/10/2021 11:09   Sodium 138   Potassium 3.8   Chloride 112 (H)   Carbon Dioxide 19 (L)   Urea Nitrogen 12   Creatinine 0.49 (L)   GFR Estimate >90   Calcium 8.3 (L)   Anion Gap 7   Albumin 3.9   Protein Total 7.8   Bilirubin Total 3.8 (H)   Alkaline Phosphatase 84   ALT 94 (H)   AST See Comment   N-Terminal Pro Bnp 31   Troponin I <0.015   Glucose 113 (H)   WBC 13.4 (H)   Hemoglobin 8.0 (L)   Hematocrit 21.0 (L)   Platelet Count 286   RBC Count 2.35 (L)   MCV 89   MCH 34.0 (H)   MCHC 37.5 (H)   RDW 25.4 (H)   % Neutrophils 69   % Lymphocytes 19   % Monocytes 6   % Eosinophils 5   % Basophils 1   Absolute Basophils 0.1   NRBC/W 23 (H)   Absolute Neutrophil 9.2 (H)   Absolute Lymphocytes 2.5   Absolute Monocytes 0.8   Absolute Eosinophils 0.7   Absolute NRBCs 3.1 (H)   RBC Morphology Confirmed RBC Indices   Platelet Morphology Automated Count Confirmed. Platelet morphology is normal.   Polychromasia Moderate (A)   Sickle Cells Marked (A)   Target Cells Slight (A)   % Retic 21.8 (H)   Absolute Retic 0.512 (H)   D-Dimer Quantitative 8.20 (H)     CXR 11/10/21  Findings:   Frontal and lateral view x-ray of the chest. Left chest wall  Port-A-Cath with tip projecting over the superior cavoatrial junction.  No acute osseous abnormality. No acute airspace opacity. No  appreciable pneumothorax or pleural effusion. Visualized upper abdomen  is within normal limits. Cardiomediastinal silhouette is within normal  limits.                                                                      Impression: No acute airspace opacity.     PIPPA LUNDBERG MD       Assessment and Plan:  1. Sickle Cell Disease  HgbSS complicated by hx stroke,  acute chest, iron overload, chronic pain, acute on chronic pulmonary embolism with multiple hospitalizations. She has been doing better recently with no ED visits, though she is in the infusion center almost daily. For now no changes to plan, though will need to strategize ways to lessen infusion room utilization such as scheduling 2-3x per week. Given acute hypoxia concerns today did not discuss.      Labs: Hgb stsable. WBC slightly elevated but at baseline. Retic slightly elevated. CMP at baseline with mild LFT /bili elevation. Also checked NT-BNP and troponin, negative. D-Dimer more elevated-see below.     Meds: Stopped Voxeletor. Continue Hydrea 2000mg daily. Contineu Jadenu 4 tablets daily. She is now on Crizanlizumab monthly last given 10/19-due 11/16. Continue Desferal every other weekend.      Pain Control: Continue MSContin 15mg BID. Continue Oxycodone to 15mg PRN max 6 per day-#50 tabs per week. DO NOT INCREASE. Continue Cymbalta 30mg BID. Continue Tylenol PRN.      Follow-up: Continue ANDREAS or MD monthly     2. Neuro  History of stroke. Continue ASA.     Dr. Pierce doing botox for spasticity and symptoms improving.      Migraines, chronic issue, saw neurology. Has follow-up with specialist scheduled in Jan     3. Psych  History of anxiety and depression. Doing well with Cymbalta and Abilify.      Follows with outside therapist.     OK to use Benadryl PRN insomnia.      4. Pulm  History of asthma, continue Symbicort and Albuterol PRN. Still needs to see pulm, not discussed.     Acute on chronic PE and DVT, failure of apixaban and rivaroxaban despite therapeutic levels. Now on Dabigitran + ASA.     Worsening dyspnea x 4 weeks. Last week sats were >90%. Now she is desating to 82% on RA. CXR unrevealing. Hgb OK. Echo scheduled, though NT-BNP is WNL.     Will get VQ scan to r/o worsening DVT in setting of further increased D-dimer.    Did ask RN[facility] to arrange for home O2.    Addendum:    Did do VQ scan that is  concerning for new PE. Patient will go to ED now to be admitted for management. Heme team aware. Called ED with report. Pending pain in ED while waiting for bed can do 1 time dose of IV morphine but then should try to stick to previously agreed upon pain plan.     Andrei Machado PA-C  Department of Hematology and Oncology  [facility]         Again, thank you for allowing me to participate in the care of your patient.        Sincerely,        RONALDO Allan

## 2021-11-10 NOTE — PROGRESS NOTES
Social Work Note: Telephone Call  Oncology Clinic     Data/Intervention:  Patient Name:  Jennifer Cervantes  /Age: 2000, 21 years old     Call From: [facility]        Reason for Call:  Transportation     Assessment:   called [employer] to arrange ride through patient's insurance. @Pay Ride arranged  for patient from home with [employer] ([phone]).  Patient will need to call when ready for return ride home.      Plan:  Patient is aware of the transportation plan.  available to assist with any other needs.     CARLOS Chavez,UDAY  Hematology/Oncology Social Worker  Phone:[phone] Pager: [phone]

## 2021-11-10 NOTE — CONFIDENTIAL NOTE
[facility] has apt to offer at 1000.  Call made to Jennifer who confirmed she can come to that apt.  Pt has virtual apt w/Andrei at the same time. Infusion will start IVF and pt can connect with Andrei at that time.   Pt states she understands how to get connected virtually.  SW notified to assist with ride.  IB sent to scheduling.  Infusion notified that pt confirmed she can come to apt.

## 2021-11-10 NOTE — PROGRESS NOTES
Today at office visit with Andrei Machado  Noted to be hypoxic.  Andrei TAI  Has p[laced orders for home oxygen.    Order sent to [facility]     Will follow up with patient in 2 days to ensure delivery     UPDATE 11/11/2021  Patient admitted after orders were faxed to home medical.    [facility] called today to requested addendum to progress note to qualifiy for hoe Oxygen.  However now that the patient is admitted will request inpatient team to arrange for home oxygen at discharge.      LVM on inpatient RN[facility] phone line with request as well

## 2021-11-10 NOTE — PROGRESS NOTES
Infusion Nursing Note:  Jennifer Cervantes presents today for IV fluids, pain medication.    Patient seen by provider today: Yes: RONALDO Allan   present during visit today: Not Applicable.    Note: Jennifer presents to infusion today with pain rating 10/10 all over her body. She states this is her usual type crisis pain and attributes the changing weather to its worsening. She also reports feeling short of breath with activity. This has been ongoing for about a month, but she feels it has worsened lately. Oxygen saturation on room air at rest 87-90%. RONALDO Allan paged.     11/10/21 1045 TORRONALDO Melara/Ember Keating RN   -Place patient on 2L NC O2. Adding lab orders to be drawn and Chest xray. Please have this scheduled ASAP. Will call patient for scheduled visit once labs and xray have resulted.     Updated patient with provider's orders, patient agreed to plan of care.     11/10/21 1242 RONALDO Winchester/Ember Keating, JOE   -Will arrange for patient to have home oxygen ordered. Please ambulate with patient and record oxygen saturations with oxygen and on room air while walking.    RONALDO Allan updated with oxygen saturations at rest and with ambulation.     11/10/21 1319 RONALDO Winchester/Ember Keating, OJE  -After consulting with Dr. Duncan, CHRISTIANO scan ordered. Please have scheduling arrange for this to be done ASAP today. Patient can take the shuttle over to the hospital for the scan and does not need to wait for results, she can go home and will update her with results/plan.     Discussed the above with patient who verbalized understanding.   Writer called nuclear medicine and confirmed they would like port to remain accessed for use during scan.      At time of discharge, patient rated her pain 7/10 after 3 doses of IV morphine. She stated she felt comfortable discharging at this point.     Intravenous Access:  Implanted Port.      Post Infusion  Assessment:  Patient tolerated infusion without incident.  Blood return noted pre and post infusion.  Site patent and intact, free from redness, edema or discomfort.  No evidence of extravasations.  Access discontinued per protocol.       Discharge Plan:   Patient declined prescription refills.  Discharge instructions reviewed with: Patient.  Patient and/or family verbalized understanding of discharge instructions and all questions answered.  AVS to patient via [employer].  Patient will return 11/16/21 for next appointment.   Patient discharged in stable condition accompanied by: self.  Departure Mode: Ambulatory.      Ember Keating RN

## 2021-11-10 NOTE — CONFIDENTIAL NOTE
Pt called in to triage requesting IVF pain meds for all over pain rated 10/10 x  Started at 3 -4 this morning . Stated last took prn at 6 am  MS contin and oxycodone, muscle relaxer and tylenol this morning with minimal relief. Denied any fevers, chills, cough, sob, chest pain,numbness tingling or other symptoms. Pt's last infusion was 11/9/21, last clinic visit 11/1/21 with follow-up on 11/10/21 with Andrei ARRIETA At 10:00 am.  Patient states they do not have own ride and it will take 60 minutes long to get to cancer clinic.   Pt denied being out of home medications and needing any refills today.  Pt qualifies for sickle cell standing order protocol.  Please note, if you are late for your appt, you risk losing your infusion appt as it may delay another patient's infusion who arrived on time.

## 2021-11-10 NOTE — PATIENT INSTRUCTIONS
Contact Numbers    [facility] Main Line/Triage and after hours / weekends / holidays: [phone]      Please call the triage or after hours line if you experience a temperature greater than or equal to 100.5, shaking chills, have uncontrolled nausea, vomiting and/or diarrhea, dizziness, shortness of breath, chest pain, bleeding, unexplained bruising, or if you have any other new/concerning symptoms, questions or concerns.      If you are having any concerning symptoms or wish to speak to a provider before your next infusion visit, please call your care coordinator or triage to notify them so we can adequately serve you.     If you need a refill on a narcotic prescription or other medication, please call before your infusion appointment.       November 2021 Sunday Monday Tuesday Wednesday Thursday Friday Saturday        1    VIDEO VISIT RETURN   9:00 AM   (60 min.)   Andrei Machado PA   [facility]    LAB CENTRAL  11:45 AM   (15 min.)   [facility] LAB DRAW   [facility]    ONC INFUSION 2 HR (120 MIN)  12:30 PM   (120 min.)    ONC INFUSION NURSE   [facility] 2    BMT INFUSION 2 HR (120 MIN)   1:00 PM   (120 min.)   [facility] BMT INFUSION NURSE   [facility] 3    BMT INFUSION 3 HR (180 MIN)  12:30 PM   (120 min.)    BMT INFUSION NURSE   [facility] 4    ONC INFUSION 2 HR (120 MIN)  11:00 AM   (120 min.)    ONC INFUSION NURSE   [facility] 5    ONC INFUSION 2 HR (120 MIN)   9:30 AM   (120 min.)    ONC INFUSION NURSE   [facility] 6       7     8    ONC INFUSION 2 HR (120 MIN)   9:30 AM   (120 min.)    ONC INFUSION NURSE   [facility] 9    BMT INFUSION 2 HR (120 MIN)  11:00 AM   (120 min.)    BMT INFUSION NURSE   [facility]  Blood [facility] Transplant Program [place] [id number]    ONC INFUSION 2 HR (120 MIN)  10:00 AM   (120 min.)    ONC INFUSION NURSE   [facility]    VIDEO VISIT RETURN  11:00 AM   (60 min.)   Andrei Machado PA   [facility]    XR CHEST 2 VIEWS  11:15 AM   (20 min.)   [email or web address]   [facility] [place] [facility] [place]    NM LUNG SCAN VENT/PERF   2:30 PM   (60 min.)   [email or web address]   [facility] 11     12     13       14     15     16    LAB CENTRAL   7:15 AM   (15 min.)   [facility] LAB DRAW   [facility]    ONC INFUSION 3 HR (180 MIN)   8:00 AM   (180 min.)    ONC INFUSION NURSE   [facility] 17     18     19     20       21     22    HEARING EVALUATION   8:45 AM   (60 min.)   Isatu Wright AuD   [facility] Audiology [place]    ECHO COMPLETE  10:15 AM   (60 min.)   [email or web address]   [facility] [place]    MR ABDOMEN WO   4:30 PM   (45 min.)   [email or web address]   [facility] MRI [place] 23     24    P NURSE VISIT   9:00 AM   (30 min.)   Nurse, [facility] Internal Medicine [place] 25     26     27       28     29     30                                     December 2021 Sunday Monday Tuesday Wednesday Thursday Friday Saturday                  1    [facility] RETURN   2:40 PM   (40 min.)   Eric Alvarado MD   [facility] [place] 2     3     4       5     6     7     8     9     10     11       12     13     14     15     16     17     18       19     20    RETURN  12:15 PM   (30 min.)   Eric Duncan MD   [facility] 21     22     23     24     25       26     27     28     29     30     31                          Lab Results:  Recent Results (from the past 12 hour(s))   Comprehensive metabolic panel    Collection  Time: 11/10/21 11:09 AM   Result Value Ref Range    Sodium 138 133 - 144 mmol/L    Potassium 3.8 3.4 - 5.3 mmol/L    Chloride 112 (H) 94 - 109 mmol/L    Carbon Dioxide (CO2) 19 (L) 20 - 32 mmol/L    Anion Gap 7 3 - 14 mmol/L    Urea Nitrogen 12 7 - 30 mg/dL    Creatinine 0.49 (L) 0.52 - 1.04 mg/dL    Calcium 8.3 (L) 8.5 - 10.1 mg/dL    Glucose 113 (H) 70 - 99 mg/dL    Alkaline Phosphatase 84 40 - 150 U/L    AST      ALT 94 (H) 0 - 50 U/L    Protein Total 7.8 6.8 - 8.8 g/dL    Albumin 3.9 3.4 - 5.0 g/dL    Bilirubin Total 3.8 (H) 0.2 - 1.3 mg/dL    GFR Estimate >90 >60 mL/min/1.73m2   Reticulocyte count    Collection Time: 11/10/21 11:09 AM   Result Value Ref Range    % Reticulocyte 21.8 (H) 0.5 - 2.0 %    Absolute Reticulocyte 0.512 (H) 0.025 - 0.095 10e6/uL   N terminal pro BNP outpatient    Collection Time: 11/10/21 11:09 AM   Result Value Ref Range    N Terminal Pro BNP Outpatient 31 0 - 125 pg/mL   Troponin I    Collection Time: 11/10/21 11:09 AM   Result Value Ref Range    Troponin I <0.015 0.000 - 0.045 ug/L   D dimer quantitative    Collection Time: 11/10/21 11:09 AM   Result Value Ref Range    D-Dimer Quantitative 8.20 (H) 0.00 - 0.50 ug/mL FEU   CBC with platelets and differential    Collection Time: 11/10/21 11:09 AM   Result Value Ref Range    WBC Count 13.4 (H) 4.0 - 11.0 10e3/uL    RBC Count 2.35 (L) 3.80 - 5.20 10e6/uL    Hemoglobin 8.0 (L) 11.7 - 15.7 g/dL    Hematocrit 21.0 (L) 35.0 - 47.0 %    MCV 89 78 - 100 fL    MCH 34.0 (H) 26.5 - 33.0 pg    MCHC 37.5 (H) 31.5 - 36.5 g/dL    RDW 25.4 (H) 10.0 - 15.0 %    Platelet Count 286 150 - 450 10e3/uL   Manual Differential    Collection Time: 11/10/21 11:09 AM   Result Value Ref Range    % Neutrophils 69 %    % Lymphocytes 19 %    % Monocytes 6 %    % Eosinophils 5 %    % Basophils 1 %    NRBCs per 100 WBC 23 (H) <=0 %    Absolute Neutrophils 9.2 (H) 1.6 - 8.3 10e3/uL    Absolute Lymphocytes 2.5 0.8 - 5.3 10e3/uL    Absolute Monocytes 0.8 0.0 - 1.3 10e3/uL     Absolute Eosinophils 0.7 0.0 - 0.7 10e3/uL    Absolute Basophils 0.1 0.0 - 0.2 10e3/uL    Absolute NRBCs 3.1 (H) <=0.0 10e3/uL    RBC Morphology Confirmed RBC Indices     Platelet Assessment  Automated Count Confirmed. Platelet morphology is normal.     Automated Count Confirmed. Platelet morphology is normal.    Polychromasia Moderate (A) None Seen    Sickle Cells Marked (A) None Seen    Target Cells Slight (A) None Seen

## 2021-11-11 LAB
HOLD SPECIMEN: NORMAL
INR PPP: 1.24 (ref 0.85–1.15)
MAGNESIUM SERPL-MCNC: 2 MG/DL (ref 1.6–2.3)
POTASSIUM BLD-SCNC: 3 MMOL/L (ref 3.4–5.3)
POTASSIUM BLD-SCNC: 3.7 MMOL/L (ref 3.4–5.3)
POTASSIUM BLD-SCNC: 3.7 MMOL/L (ref 3.4–5.3)
UFH PPP CHRO-ACNC: 0.42 IU/ML
UFH PPP CHRO-ACNC: 0.55 IU/ML
UFH PPP CHRO-ACNC: 0.97 IU/ML
UFH PPP CHRO-ACNC: 1.1 IU/ML

## 2021-11-11 PROCEDURE — 86147 CARDIOLIPIN ANTIBODY EA IG: CPT | Performed by: PHYSICIAN ASSISTANT

## 2021-11-11 PROCEDURE — 36591 DRAW BLOOD OFF VENOUS DEVICE: CPT | Performed by: PHYSICIAN ASSISTANT

## 2021-11-11 PROCEDURE — 36591 DRAW BLOOD OFF VENOUS DEVICE: CPT | Performed by: INTERNAL MEDICINE

## 2021-11-11 PROCEDURE — 250N000013 HC RX MED GY IP 250 OP 250 PS 637: Performed by: INTERNAL MEDICINE

## 2021-11-11 PROCEDURE — 85613 RUSSELL VIPER VENOM DILUTED: CPT | Performed by: PHYSICIAN ASSISTANT

## 2021-11-11 PROCEDURE — 250N000011 HC RX IP 250 OP 636: Performed by: INTERNAL MEDICINE

## 2021-11-11 PROCEDURE — 250N000013 HC RX MED GY IP 250 OP 250 PS 637

## 2021-11-11 PROCEDURE — 85520 HEPARIN ASSAY: CPT | Performed by: STUDENT IN AN ORGANIZED HEALTH CARE EDUCATION/TRAINING PROGRAM

## 2021-11-11 PROCEDURE — 85520 HEPARIN ASSAY: CPT | Performed by: INTERNAL MEDICINE

## 2021-11-11 PROCEDURE — 85610 PROTHROMBIN TIME: CPT | Performed by: INTERNAL MEDICINE

## 2021-11-11 PROCEDURE — 99222 1ST HOSP IP/OBS MODERATE 55: CPT | Performed by: INTERNAL MEDICINE

## 2021-11-11 PROCEDURE — 83735 ASSAY OF MAGNESIUM: CPT | Performed by: INTERNAL MEDICINE

## 2021-11-11 PROCEDURE — 120N000002 HC R&B MED SURG/OB UMMC

## 2021-11-11 PROCEDURE — 99233 SBSQ HOSP IP/OBS HIGH 50: CPT | Performed by: INTERNAL MEDICINE

## 2021-11-11 PROCEDURE — 84132 ASSAY OF SERUM POTASSIUM: CPT | Performed by: INTERNAL MEDICINE

## 2021-11-11 PROCEDURE — 36591 DRAW BLOOD OFF VENOUS DEVICE: CPT | Performed by: STUDENT IN AN ORGANIZED HEALTH CARE EDUCATION/TRAINING PROGRAM

## 2021-11-11 PROCEDURE — 83605 ASSAY OF LACTIC ACID: CPT | Performed by: INTERNAL MEDICINE

## 2021-11-11 PROCEDURE — 250N000013 HC RX MED GY IP 250 OP 250 PS 637: Performed by: EMERGENCY MEDICINE

## 2021-11-11 PROCEDURE — 250N000011 HC RX IP 250 OP 636: Performed by: EMERGENCY MEDICINE

## 2021-11-11 PROCEDURE — 258N000002 HC RX IP 258 OP 250

## 2021-11-11 PROCEDURE — 258N000003 HC RX IP 258 OP 636: Performed by: EMERGENCY MEDICINE

## 2021-11-11 PROCEDURE — 258N000002 HC RX IP 258 OP 250: Performed by: INTERNAL MEDICINE

## 2021-11-11 PROCEDURE — 85390 FIBRINOLYSINS SCREEN I&R: CPT | Mod: 26 | Performed by: PATHOLOGY

## 2021-11-11 PROCEDURE — 86146 BETA-2 GLYCOPROTEIN ANTIBODY: CPT | Performed by: PHYSICIAN ASSISTANT

## 2021-11-11 PROCEDURE — 84132 ASSAY OF SERUM POTASSIUM: CPT

## 2021-11-11 RX ORDER — HYDROMORPHONE HYDROCHLORIDE 1 MG/ML
0.5 INJECTION, SOLUTION INTRAMUSCULAR; INTRAVENOUS; SUBCUTANEOUS ONCE
Status: COMPLETED | OUTPATIENT
Start: 2021-11-11 | End: 2021-11-11

## 2021-11-11 RX ORDER — POTASSIUM CHLORIDE 750 MG/1
40 TABLET, EXTENDED RELEASE ORAL ONCE
Status: COMPLETED | OUTPATIENT
Start: 2021-11-11 | End: 2021-11-11

## 2021-11-11 RX ORDER — OXYCODONE HYDROCHLORIDE 5 MG/1
5 TABLET ORAL EVERY 4 HOURS PRN
Status: DISCONTINUED | OUTPATIENT
Start: 2021-11-11 | End: 2021-11-11 | Stop reason: DRUGHIGH

## 2021-11-11 RX ORDER — ACETAMINOPHEN 325 MG/1
975 TABLET ORAL EVERY 8 HOURS
Status: DISCONTINUED | OUTPATIENT
Start: 2021-11-11 | End: 2021-11-21 | Stop reason: HOSPADM

## 2021-11-11 RX ORDER — OXYCODONE HYDROCHLORIDE 10 MG/1
20 TABLET ORAL EVERY 4 HOURS PRN
Status: DISCONTINUED | OUTPATIENT
Start: 2021-11-11 | End: 2021-11-14

## 2021-11-11 RX ORDER — SODIUM CHLORIDE 450 MG/100ML
INJECTION, SOLUTION INTRAVENOUS CONTINUOUS
Status: DISCONTINUED | OUTPATIENT
Start: 2021-11-11 | End: 2021-11-12

## 2021-11-11 RX ORDER — LIDOCAINE 40 MG/G
CREAM TOPICAL
Status: DISCONTINUED | OUTPATIENT
Start: 2021-11-11 | End: 2021-11-14

## 2021-11-11 RX ORDER — HYDROMORPHONE HYDROCHLORIDE 1 MG/ML
0.5 INJECTION, SOLUTION INTRAMUSCULAR; INTRAVENOUS; SUBCUTANEOUS EVERY 6 HOURS PRN
Status: DISCONTINUED | OUTPATIENT
Start: 2021-11-11 | End: 2021-11-12

## 2021-11-11 RX ORDER — POTASSIUM CHLORIDE 750 MG/1
20 TABLET, EXTENDED RELEASE ORAL ONCE
Status: COMPLETED | OUTPATIENT
Start: 2021-11-11 | End: 2021-11-11

## 2021-11-11 RX ORDER — HYDROMORPHONE HYDROCHLORIDE 1 MG/ML
0.5 INJECTION, SOLUTION INTRAMUSCULAR; INTRAVENOUS; SUBCUTANEOUS EVERY 4 HOURS PRN
Status: DISCONTINUED | OUTPATIENT
Start: 2021-11-11 | End: 2021-11-11

## 2021-11-11 RX ORDER — AMOXICILLIN 250 MG
2 CAPSULE ORAL 2 TIMES DAILY PRN
Status: DISCONTINUED | OUTPATIENT
Start: 2021-11-11 | End: 2021-11-21 | Stop reason: HOSPADM

## 2021-11-11 RX ORDER — SODIUM CHLORIDE, SODIUM LACTATE, POTASSIUM CHLORIDE, CALCIUM CHLORIDE 600; 310; 30; 20 MG/100ML; MG/100ML; MG/100ML; MG/100ML
INJECTION, SOLUTION INTRAVENOUS CONTINUOUS
Status: DISCONTINUED | OUTPATIENT
Start: 2021-11-11 | End: 2021-11-11

## 2021-11-11 RX ORDER — AMOXICILLIN 250 MG
1 CAPSULE ORAL 2 TIMES DAILY PRN
Status: DISCONTINUED | OUTPATIENT
Start: 2021-11-11 | End: 2021-11-21 | Stop reason: HOSPADM

## 2021-11-11 RX ORDER — WARFARIN SODIUM 5 MG/1
5 TABLET ORAL
Status: COMPLETED | OUTPATIENT
Start: 2021-11-11 | End: 2021-11-11

## 2021-11-11 RX ORDER — LANOLIN ALCOHOL/MO/W.PET/CERES
3 CREAM (GRAM) TOPICAL
Status: DISCONTINUED | OUTPATIENT
Start: 2021-11-11 | End: 2021-11-21 | Stop reason: HOSPADM

## 2021-11-11 RX ADMIN — DULOXETINE 30 MG: 30 CAPSULE, DELAYED RELEASE ORAL at 08:17

## 2021-11-11 RX ADMIN — HYDROMORPHONE HYDROCHLORIDE 0.5 MG: 1 INJECTION, SOLUTION INTRAMUSCULAR; INTRAVENOUS; SUBCUTANEOUS at 17:28

## 2021-11-11 RX ADMIN — OXYCODONE HYDROCHLORIDE 15 MG: 5 TABLET ORAL at 06:00

## 2021-11-11 RX ADMIN — HYDROMORPHONE HYDROCHLORIDE 0.5 MG: 1 INJECTION, SOLUTION INTRAMUSCULAR; INTRAVENOUS; SUBCUTANEOUS at 10:11

## 2021-11-11 RX ADMIN — SODIUM CHLORIDE: 4.5 INJECTION, SOLUTION INTRAVENOUS at 10:28

## 2021-11-11 RX ADMIN — HEPARIN SODIUM 900 UNITS/HR: 1000 INJECTION INTRAVENOUS; SUBCUTANEOUS at 19:13

## 2021-11-11 RX ADMIN — MORPHINE SULFATE 15 MG: 15 TABLET, EXTENDED RELEASE ORAL at 21:44

## 2021-11-11 RX ADMIN — HYDROXYUREA 2000 MG: 500 CAPSULE ORAL at 08:18

## 2021-11-11 RX ADMIN — SODIUM CHLORIDE: 4.5 INJECTION, SOLUTION INTRAVENOUS at 03:05

## 2021-11-11 RX ADMIN — WARFARIN SODIUM 5 MG: 5 TABLET ORAL at 20:23

## 2021-11-11 RX ADMIN — PANTOPRAZOLE SODIUM 40 MG: 40 TABLET, DELAYED RELEASE ORAL at 08:17

## 2021-11-11 RX ADMIN — SODIUM CHLORIDE: 4.5 INJECTION, SOLUTION INTRAVENOUS at 20:16

## 2021-11-11 RX ADMIN — OXYCODONE HYDROCHLORIDE 15 MG: 5 TABLET ORAL at 00:25

## 2021-11-11 RX ADMIN — POTASSIUM CHLORIDE 20 MEQ: 750 TABLET, EXTENDED RELEASE ORAL at 05:55

## 2021-11-11 RX ADMIN — POTASSIUM CHLORIDE 40 MEQ: 750 TABLET, EXTENDED RELEASE ORAL at 03:01

## 2021-11-11 RX ADMIN — ACETAMINOPHEN 975 MG: 325 TABLET, FILM COATED ORAL at 10:05

## 2021-11-11 RX ADMIN — MORPHINE SULFATE 15 MG: 15 TABLET, EXTENDED RELEASE ORAL at 10:11

## 2021-11-11 RX ADMIN — DULOXETINE 30 MG: 30 CAPSULE, DELAYED RELEASE ORAL at 20:11

## 2021-11-11 RX ADMIN — FLUTICASONE FUROATE AND VILANTEROL TRIFENATATE 1 PUFF: 200; 25 POWDER RESPIRATORY (INHALATION) at 08:19

## 2021-11-11 RX ADMIN — OXYCODONE HYDROCHLORIDE 20 MG: 10 TABLET ORAL at 20:11

## 2021-11-11 RX ADMIN — OXYCODONE HYDROCHLORIDE 15 MG: 5 TABLET ORAL at 16:07

## 2021-11-11 ASSESSMENT — ACTIVITIES OF DAILY LIVING (ADL)
WALKING_OR_CLIMBING_STAIRS_DIFFICULTY: NO
ADLS_ACUITY_SCORE: 3
CONCENTRATING,_REMEMBERING_OR_MAKING_DECISIONS_DIFFICULTY: NO
ADLS_ACUITY_SCORE: 3
ADLS_ACUITY_SCORE: 3
CONCENTRATING,_REMEMBERING_OR_MAKING_DECISIONS_DIFFICULTY: NO
ADLS_ACUITY_SCORE: 3
ADLS_ACUITY_SCORE: 3
DRESSING/BATHING_DIFFICULTY: NO
WEAR_GLASSES_OR_BLIND: NO
ADLS_ACUITY_SCORE: 7
FALL_HISTORY_WITHIN_LAST_SIX_MONTHS: NO
ADLS_ACUITY_SCORE: 3
HEARING_DIFFICULTY_OR_DEAF: NO
DIFFICULTY_EATING/SWALLOWING: NO
ADLS_ACUITY_SCORE: 3
DIFFICULTY_COMMUNICATING: NO
ADLS_ACUITY_SCORE: 3
DOING_ERRANDS_INDEPENDENTLY_DIFFICULTY: NO
ADLS_ACUITY_SCORE: 5
TOILETING_ISSUES: NO
DIFFICULTY_COMMUNICATING: NO
ADLS_ACUITY_SCORE: 3
WALKING_OR_CLIMBING_STAIRS_DIFFICULTY: NO
DIFFICULTY_EATING/SWALLOWING: NO
ADLS_ACUITY_SCORE: 3
ADLS_ACUITY_SCORE: 7
ADLS_ACUITY_SCORE: 3
ADLS_ACUITY_SCORE: 5
ADLS_ACUITY_SCORE: 3
WEAR_GLASSES_OR_BLIND: NO
ADLS_ACUITY_SCORE: 3
FALL_HISTORY_WITHIN_LAST_SIX_MONTHS: NO
ADLS_ACUITY_SCORE: 5
ADLS_ACUITY_SCORE: 3
DOING_ERRANDS_INDEPENDENTLY_DIFFICULTY: NO
TOILETING_ISSUES: NO
ADLS_ACUITY_SCORE: 3
DRESSING/BATHING_DIFFICULTY: NO
ADLS_ACUITY_SCORE: 3

## 2021-11-11 ASSESSMENT — ENCOUNTER SYMPTOMS
NECK PAIN: 0
BACK PAIN: 1
FATIGUE: 0
NECK STIFFNESS: 0

## 2021-11-11 ASSESSMENT — MIFFLIN-ST. JEOR: SCORE: 1516.11

## 2021-11-11 NOTE — PROGRESS NOTES
"BP (!) 150/54 (BP Location: Left arm, Patient Position: Sitting)   Pulse 89   Temp 97.6  F (36.4  C) (Oral)   Resp 18   Ht 1.626 m (5' 4\")   Wt 79.4 kg (175 lb 0.7 oz)   SpO2 93%   BMI 30.05 kg/m      [id number]    VSS on 2L NC. Afebrile. Potassium was low at 3.0, RN managed replacement initiated. Patient seemed unwilling to take potassium replacement, but eventually was agreeable to take them. Heparin Xa was 1.1, RN managed protocol in place. Both to be rechecked today at 0945. Port Infusing 1/2 NS at 125 mL/hr with heparin Y sited in at 1100 units/hr. Patient complains of pain, PRN oxy given with little improvement. Denies nausea. No BM this shift. Voiding adequately and not saving. No skin issues. Right sided hemiparesis with right arm contractures. Patient walks with a limp but is able to ambulate. Continue with plan of care and notify team of any changes.   "

## 2021-11-11 NOTE — PROGRESS NOTES
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 Hematology recommends coumadin with INR goal 2-3 and bridging with heparin and have ordered APS workup. Pain meds being adjusted for diffuse body pain. No evidence of acute chest.     #Acute hypoxic respiratory failure  #Recurrent Pulm Embolism  Diagnosed with  PE On 2/1/21 started on rivaroxaban  Switched to apixaban on  3/25/21 with RUE DVT. Developed worsening of PE requiring admission from 4/26/21-5/11/21  in setting of low Apixaban levels. Admitted again From  7/13/21-7/25/21 for acute on chronic PE, switched to dabigitran + ASA.  Claims that she is taking the medicines properly at this time.  Presented with worsening of SOB  of 1 month.  No chest pain. NM perfusion scan on 11/10/21 showed  at least 3 new perfusion defects In the background of chronic perfusion defects which are suspicion for acute on chronic PE. These developed in the setting of dabiagtan and ASA which is concerning for anticoagulation failure. Appreciate hematology input. Start coumadin and bridge with high intensity heparin. APS workup ordered by hematology.     #Left arm pain, diffuse joint pain all over body, no evidence of acute chest syndrome, Acute pain crisis with known h/o Sickle Cell Disease:   Hgb at baseline, CXR  w/o infiltrates  Plan:    Decrease Ringer lactate to 75 ml/hr    Continue PTA MS contin 15mg CR BID    Increase Oxycodone to 20mg PO  Q4 hours PRN    No PCA for now    IV dilaudid 0.5 mg q 6 h prn (taper to off ASAP)    Zofran PRN    Senna    Trend hemoglobin, LDH, reticulocyte count     #sickle cell anemia  #Iron overload 2/2blood transfusion    Continue PTA Hydrea 2000mg/daily    Continue PTA [id number]/day     #Bronchial asthma    Continue PTA  albuterol    Continue PTA Symbicort     #Depression,    Continue PTA duloxetine 30mg/day     #Hx of CVA          Diet: Combination Diet Regular Diet Adult    DVT Prophylaxis: Warfarin  Lopez Catheter: Not present  Central Lines: None  Code Status: Full Code      Disposition Plan   Expected discharge: 11/18/2021   recommended to prior living arrangement once medically stable.     The patient's care was discussed with the Patient and hematology Consultant.    Terese Arroyo MD  Hospitalist Service, [address]  Securely message with the Vocera Web Console (learn more here)  Text page via [employer] Paging/Directory    Please see sign in/sign out for up to date coverage information    Clinically Significant Risk Factors Present on Admission             # Coagulation Defect: home medication list includes an anticoagulant medication  # Platelet Defect: home medication list includes an antiplatelet medication         ______________________________________________________________________    Interval History   Patient is having some breakthrough pain issues. ROS neg    Data reviewed today: I reviewed all medications, new labs and imaging results over the last 24 hours.     Physical Exam   Vital Signs: Temp: 97.3  F (36.3  C) Temp src: Oral BP: 131/64 Pulse: 98   Resp: 18 SpO2: 95 % O2 Device: Nasal cannula Oxygen Delivery: 1 LPM  Weight: 175 lbs .72 oz  General Appearance: Well built and nourished, not in any acute cardio pulmonary  distress  Respiratory: CTA b/l  Cardiovascular: S1S2 normal RRR  GI: soft NT  Skin: NAD  Other: aaox3 moving all 4 ext spont     Data   Recent Labs   Lab 11/11/21  0955 11/11/21  0112 11/10/21  1943 11/10/21  1109   WBC  --   --  13.9* 13.4*   HGB  --   --  7.7* 8.0*   MCV  --   --  91 89   PLT  --   --  281 286   INR  --   --  1.23*  --    NA  --   --  136 138   POTASSIUM 3.7  3.7 3.0* 3.3* 3.8   CHLORIDE  --   --  110* 112*   CO2  --   --  19* 19*   BUN  --   --  11 12   CR  --   --  0.49* 0.49*   ANIONGAP  --   --  7 7   MICAH  --   --  8.5 8.3*   GLC  --   --  95 113*   ALBUMIN  --   --  3.8 3.9   PROTTOTAL  --   --  7.6 7.8   BILITOTAL  --   --  3.5* 3.8*   ALKPHOS  --   --  79 84   ALT  --   --  85* 94*   AST  --   --  112*  --    TROPONIN  --   --  <0.015 <0.015     No results found for this or any previous visit (from the past 24 hour(s)).  Medications     heparin 900 Units/hr (11/11/21 1200)     - MEDICATION INSTRUCTIONS -       NaCl 75 mL/hr at 11/11/21 1613     Warfarin Therapy Reminder         acetaminophen  975 mg Oral Q8H     deferasirox  1,440 mg Oral QPM     DULoxetine  30 mg Oral BID     fluticasone-vilanterol  1 puff Inhalation Daily     hydroxyurea  2,000 mg Oral Daily     morphine  15 mg Oral Q12H     pantoprazole  40 mg Oral QAM AC     sodium chloride (PF)  3 mL Intracatheter Q8H

## 2021-11-11 NOTE — ED PROVIDER NOTES
ED Provider Note  [facility]      History     Chief Complaint   Patient presents with     Shortness of Breath     The history is provided by the patient and medical records.     Jennifer Cervantes is a 21 year old female with sickle cell disease with previous pain crises, acute chest, multiple PEs, asthma, presenting today from recommendation of the Hematology clinic and concerned for hypoxia with shortness of breath with exertion over the last 4 to 6 weeks, as well as found to have acute on chronic PEs on VQ scan ( but with negative troponin and BNP), accompanied with back pain consistent with previous sickle cell pain crises.      REVIEW OF EMR:  In addition to the patient's sickle cell she also has a history of asthma as well as previous acute chest and multiple PEs.  In the past was found to have new PEs in February of last year, was started on rivaroxaban and switch to Eliquis in March with a right upper extremity DVT.    - She was admitted the end of April through mid May with sickle cell pain crises complicated by worsening PE in the setting of low apixaban levels and acute hypoxic respiratory failure, pneumonia and acute chest.    - She underwent exchange transfusion on 4/30 and 5/4 with significant improvement after the second exchange transfusion and was able to be taken off oxygen on 5/6.  -She was admitted for 12 days in the middle of July for acute on chronic PE and was switched to dabigitran and aspirin.  -She had been doing better, and was even changed to a no IV opioids in the ED plan and had not been in the ED for 6 weeks.  - Continues on regular Hydrea and Jadenu, desferal every other weekend, as well as Crizanlizumab monthly (would be next due on 11/16).   - Continues on MS Contin 15 mg twice daily, oxycodone 50 mg as needed, Cymbalta and Tylenol    Patient had a video visit today with the Hematology team, where she was reporting some new back pain, perhaps worsened by  recent desferal infusion and the cold weather, and also reported 4 weeks of worsening shortness of breath w/ activity. (Had previous plan to see Pulmonology, but has not happened yet). They were able to check her oxygen saturations at the infusion center.  Oxygen saturations last week were all above 90, now was found to be hypoxic down to the 80s with exertion.  Based on this they performed a chest x-ray that they thought to be unrevealing, but ordered a VQ scan given her history of PEs, that showed at least 3 new perfusion defects that they thought likely consistent with acute on chronic PEs (see impression below and full report in EMR), referred in for further evaluation/management and admission.   - The Hematology team was reportedly aware of the patient coming to the ED with plan for admission, and they had recommended a one-time dose of IV morphine for pain management while in the ED and awaiting for bed, and then try to stick to the previously arranged ED pain management plan.     Currently, patient reports that she had gone to the clinic today mostly for the back discomfort that she reports that is similar to previous sickle cell pain crises.  With this she denies any traumas or falls.  No fevers or chills.  No numbness, tingling or weakness.  She does not have any chest discomfort.  No shortness of breath at rest, but she says that with walking around activity she would feel short of breath.  She notes that this has been going on for the last 4 to 6 weeks.  No cough or sputum production.  No known Covid or exposures that she is aware of.  No sore throats, trouble swallowing or other HEENT symptoms.  She reports she felt somewhat similar to this when she was in the hospital back in April/May and needed exchange transfusion, she reports that she has felt short of breath like this when she has needed just a general blood transfusion in the past or if she was sick, but she reports that her hemoglobin has been  relatively stable from her usual baseline and that she has not felt sick in any way.  No fevers, chills, myalgias, or other symptoms.  Back pain is fairly diffuse.  No extremity symptoms.  No chest or abdominal symptoms.  No nausea or vomiting.  No change in bowel or bladder habits.  No  symptoms or vaginal symptoms.  She does not think that she is pregnant.  No rashes or skin changes.  No leg pain or swelling.  No lightheadedness, dizziness, syncope or near syncope. Patient has reportedly been taking her oral anticoagulation medications as previously recommended.  No other new symptoms or complaints this time.  Please see ROS for further details.    Labs (Today, 11/10/21 @ 11:09am):  - Troponin negative, BNP 31  - D-dimer 8.20  - WBC 13.4 (up from previous), Hgb 8.0, near previous)  - Reticulocyte count 0.512 absolute/ 21.8%    CXR (Today, 11/10/21):  No acute airspace opacity.    V/Q Scan (Today, 11/10/21):  In the background of a few scattered chronic perfusion defects there are at least three new perfusion defects which are suspicious for acute on chronic pulmonary embolism.      Past Medical History  Past Medical History:   Diagnosis Date     Anxiety      Bleeding disorder (H)      Blood clotting disorder (H)      Cerebral infarction (H) 2015     Cognitive developmental delay     low IQ. 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 (H)     right hand contractures     Iron overload due to repeated red blood cell transfusions      Migraines      Multiple subsegmental pulmonary emboli without acute cor pulmonale (H) 02/01/2021     Oppositional defiant behavior      Superficial venous thrombosis of arm, right 03/25/2021     Uncomplicated asthma      Past Surgical History:   Procedure Laterality Date     AS INSERT TUNNELED CV 2 CATH W/O PORT/PUMP       C BREAST REDUCTION (INCLUDES LIPO)  TIER 3 Bilateral 04/23/2019     CHOLECYSTECTOMY       INSERT PORT VASCULAR ACCESS Left 4/21/2021    Procedure: INSERTION, VASCULAR ACCESS PORT (NOT SURE ON SIDE UNTIL REMOVAL);  Surgeon: Rjaan More MD;  Location: [facility] OR     IR CHEST PORT PLACEMENT > 5 YRS OF AGE  4/21/2021     IR CVC NON TUNNEL LINE REMOVAL  5/6/2021     IR CVC NON TUNNEL PLACEMENT  04/07/2020     IR CVC NON TUNNEL PLACEMENT  4/30/2021     IR PORT REMOVAL LEFT  4/21/2021     REMOVE PORT VASCULAR ACCESS Left 4/21/2021    Procedure: REMOVAL, VASCULAR ACCESS PORT LEFT;  Surgeon: Rajan More MD;  Location: [facility] OR     REPAIR TENDON ELBOW Right 10/02/2019    Procedure: Right Elbow Flexor Lengthening, Flexor Pronator Slide Of Wrist and Finger, Thumb Adductor Lengthening;  Surgeon: Anai Franco MD;  Location: [facility] OR     TONSILLECTOMY Bilateral 10/02/2019    Procedure: Bilateral Tonsillectomy;  Surgeon: Farhana Guy MD;  Location: [facility] OR     acetaminophen (TYLENOL) 325 MG tablet  albuterol (PROAIR HFA/PROVENTIL HFA/VENTOLIN HFA) 108 (90 Base) MCG/ACT inhaler  ARIPiprazole (ABILIFY) 2 MG tablet  aspirin (ASA) 81 MG chewable tablet  budesonide-formoterol (SYMBICORT) 160-4.5 MCG/ACT Inhaler  dabigatran ANTICOAGULANT (PRADAXA) 150 MG capsule  diphenhydrAMINE (BENADRYL) 25 MG capsule  DULoxetine (CYMBALTA) 30 MG capsule  EPINEPHrine (ANY BX GENERIC EQUIV) 0.3 MG/0.3ML injection 2-pack  Hydroxyurea 1000 MG TABS  hydrOXYzine (ATARAX) 25 MG tablet  JADENU 360 MG tablet  morphine (MS CONTIN) 15 MG CR tablet  naloxone (NARCAN) 4 MG/0.1ML nasal spray  omeprazole (PRILOSEC) 20 MG DR capsule  ondansetron (ZOFRAN) 8 MG tablet  oxyCODONE IR (ROXICODONE) 15 MG tablet  albuterol (PROVENTIL) (2.5 MG/3ML) 0.083% neb solution  lidocaine-prilocaine (EMLA) 2.5-2.5 % external cream  medroxyPROGESTERone (DEPO-PROVERA) 150 MG/ML IM injection      Allergies   Allergen Reactions     Contrast Dye      Hives and breathing issues     Fish-Derived Products  "Hives     Seafood Hives     Diagnostic X-Ray Materials      Gadolinium      Family History  Family History   Problem Relation Age of Onset     Sickle Cell Trait Mother      Hypertension Mother      Asthma Mother      Sickle Cell Trait Father      Social History   Social History     Tobacco Use     Smoking status: Never Smoker     Smokeless tobacco: Never Used   Substance Use Topics     Alcohol use: Not Currently     Alcohol/week: 0.0 standard drinks     Drug use: Never      Past medical history, past surgical history, medications, allergies, family history, and social history were reviewed with the patient. No additional pertinent items.       Review of Systems   Constitutional: Negative for chills, fatigue and fever.   HENT: Negative.  Negative for sore throat and trouble swallowing.    Respiratory: Positive for shortness of breath. Negative for cough.    Cardiovascular: Negative for chest pain and leg swelling.   Gastrointestinal: Negative for abdominal pain, nausea and vomiting.   Genitourinary: Negative.    Musculoskeletal: Positive for back pain (diffuse, consistent w/ previous sickle cell pain crises, no accompanying symptoms). Negative for neck pain and neck stiffness.   Skin: Negative.  Negative for rash.   Neurological: Negative for dizziness, syncope, weakness, light-headedness and numbness.   Psychiatric/Behavioral: Negative for suicidal ideas.   All other systems reviewed and are negative.      Physical Exam   BP: (!) 143/93  Pulse: 102  Temp: 98.6  F (37  C)  Resp: 16  Height: 162.6 cm (5' 4\")  Weight: 77.1 kg (170 lb)  SpO2: (!) 87 %  Physical Exam  CONSTITUTIONAL: Well-developed and well-nourished. Awake and alert. Non-toxic appearance. No acute distress.   HENT:   - Head: Normocephalic and atraumatic.   - Ears: Hearing and external ear grossly normal.   - Nose: Nose normal. No rhinorrhea. No epistaxis.   - Mouth/Throat: MMM  EYES: Conjunctivae and lids are normal. No scleral icterus.   NECK: Normal " range of motion and phonation normal. Neck supple.  No tracheal deviation, no stridor. No edema or erythema noted.  CARDIOVASCULAR: Normal rate, regular rhythm and no appreciable abnormal heart sounds.  PULMONARY/CHEST:  Normal work of breathing at rest, no obvious lung findings on exam, though does become objectively hypoxic off oxygen down to the mid 80s, improved to 100% on 2L NC.  ABDOMEN: Soft, non-distended. No tenderness. No rigidity, rebound or guarding.   MUSCULOSKELETAL: warm, seemingly well perfused. No obvious extremity swelling or discomfort. No focal pain. No bony findings.  No skin changes.  No pain. No apparent limitations in ROM.  No obvious neurovascular deficits or abnormalities.  NEUROLOGIC: Awake, alert, mentating appropriately. Normal speech. No obvious focal abnormalities.   SKIN: Skin is warm and dry. No rash noted. No diaphoresis. No pallor.   PSYCHIATRIC: Normal mood and affect. Speech and behavior normal. Thought processes linear. Cognition and memory are normal.     ED Course     ED Course as of 11/10/21 2006   Wed Nov 10, 2021   1953 Discussed w/ Heme, They recommended heparin drip (high-intensity w/ Load bolus), and admit to IM for further eval management   2003 IM admitting to Med Tele. Discussed case and discussion w/ Heme. They will continue to manage. Confirmed plan w/ PPM            EKG Interpretation:      Interpreted by Reta Miramontes MD  Time reviewed:19:50   Symptoms at time of EKG: shortness of breath   Rhythm: Normal sinus   Rate: 91  Axis: Normal  Ectopy: None  Conduction: Normal  ST Segments/ T Waves: No ST-T wave changes and No acute ischemic changes  Comparison to prior: Grossly unchanged from 9/14/21 and 9/17/21  Clinical Impression: no significant changes       Assessments & Plan (with Medical Decision Making)   IMPRESSION: 21 year old female w/ PMH notable for sickle cell disease with previous pain crises, acute chest, multiple PEs, asthma, presenting today from  recommendation of the Hematology clinic and concerned for hypoxia with shortness of breath with exertion over the last 4 to 6 weeks, as well as found to have acute on chronic PEs on VQ scan ( but with negative troponin and BNP), accompanied with back pain consistent with previous sickle cell pain crises, as described further above.     Clinically, patient appears patient appears nontoxic, NAD. . Vitals show heart rate in the high 90s during the exam, saturations are improved to 100% on nasal cannula oxygen the patient has normal work of breathing at rest (did become hypoxic when she was off oxygen down to the mid 80s). Otherwise on examination, cardiopulmonary exam benign.  Abdominal exam benign.  No acute back or extremity findings.  Looks actually quite well considering her objective hypoxia off nasal cannula.    Ddx includes, but not limited to, acute on chronic PE which I think to be most likely given her VQ scan, normal chest x-ray, no other obvious infectious symptoms for a respiratory infectious process, think less likely COVID (though we will still test of course as admitting to hospital), think less likely acute chest given the normal chest imaging otherwise, does not sound as consistent with ACS, dissection, no vomitting etc. no chest pain for  Boerhaave syndrome, think less likely for pericarditis or pericardial effusion, think less like pneumothorax, effusion, etc. especially given no findings for such on prior chest imaging earlier today. With regards to her back pain this sounds to be consistent with her prior sickle cell pain crises.  She has not had any traumas or falls, no fevers chills, no neuro issues, no incontinence to make me think she has some sort of spinal cord compression or acute spinal issue such as discitis, epidural abcess, hematoma, etc.  No radicular symptoms for such.     PLAN: ECG, laboratory studies, discuss with Hematology, admit to IM for further evaluation and management and  ongoing Hem consult.  Thankfully no findings for strain with negative troponin at clinic, will get EKG and repeats BMP and troponin now.  - Risks/benefits of pursuing imaging reviewed and accepted.     RESULTS:  - Labs:  WBC 13.9, Hgb 7.7 (fairly similar to previous), see reticulocyte count from earlier today  --- INR 1.23, APTT 32  --- Pregnancy negative  --- Lactate normal at 1.2   --- COVID pending  - Imaging: Seeing imaging from clinic earlier today    INTERVENTIONS:   - IVF  - IV morphine 2 mg IV x1 (cleared with Hematology)  - IV heparin drip (high intensity with bolus as per discussion with Hematology)     RE-EVALUATION:  - The patient's symptoms were somewhat improved during ED stay.   - Pt otherwise continues to do well here in the ED, no acute issues or apparent concerning changes in vitals or clinical appearance.    DISCUSSIONS:  - w/ Hematology: they are quite familiar with this patient.  They recommend IV heparin drip, high-intensity with bolus, and they will continue to follow while inpatient.   - w/ IM: Reviewed case, findings, discussion w/ Heme. They will admit to med tele and continue to follow with Hematology, F/U pending studies, speak w/ consults and continue to manage as indicated.   - w/ Patient: I have reviewed the available findings, plan with the patient. She expressed understanding and agreement with this plan. All questions answered to the best of our ability at this time.     DISPOSITION/PLANNING:  - IMPRESSION: Acute on chronic PE, hypoxia/acute hypoxic respiratory failure, back pain in the setting of sickle cell disease   - DISPOSITION: Admit to IM for evaluation of the management  - RECOMMENDATIONS: Hematology consult, symptom management, O2 support, consider transfusion/exchange trans      ______________________________________________________________________  Connie NEWMAN, am serving as a trained medical scribe to document services personally performed by Reta Miramontes MD,  based on the provider's statements to me.  I, Reta Miramontes MD, was physically present and have reviewed and verified the accuracy of this note documented by Connie Malik.    --  [facility] EMERGENCY DEPARTMENT  11/10/2021     Reta Miramontes MD  11/11/21 1730

## 2021-11-11 NOTE — H&P
[facility]    History and Physical - [facility] Night Service        Date of Admission:  11/10/2021    Assessment & Plan      Jennifer Cervantes is a 21 year old female with HgbSS c/b frequent pain crises (acute and chronic components), history of CVA  leading to significant cognitive delays and right upper extremity hemiparesis, iron overload 2/2 chronic transfusions, anxiety/depression, asthma, Recurrent PTE on dabigatran and  ASA presented with  worsening of shortness of breath        #Acute hypoxic respiratory failure  #Recurrent PTE  Diagnosed with  PTE  On 2/1/21 started on rivaroxaban  Switched to apixaban on  3/25/21 with RUE DVT. Developed worsening of PTE requiring admission from 4/26/21-5/11/21  in setting of low Apixaban levels. Admitted again From  7/13/21-7/25/21 for acute on chronic PE, switched to dabigitran + ASA.  Claims that she is taking the medicines properly at this time.  Presented with worsening of SOB  of 1 month.  No chest pain.  NM perfusion scan on 11/10/21 showed  at least 3 new perfusion defects In the background of chronic perfusion defects which are suspicion for acute on chronic PE   PTE developed in the setting of dabiagtan and ASA which is concerning for anticoagulation failure vs. Medication non-compliance given pt hx of low drug levels. Tried to get dabigatran drug level but not available at hospital lab. Only available reference lab at [facility]  Plan:    High intensity Heparin infusion    Intranasal oxygen    Consider warfarin    Consult heme, am    #Left arm pain  #Concern for sickle cell crisis  Left arm pain,  9/10.  Denies chest, back or abdominal pain.  Hgb 8.0 on 11/10 compared with  8.0 11/1.  Plan:    Ringer lactate 125ml/hr    Continue PTA MS contin 15mg CR BID    Oxycodon 15mg PO  Q4-6 hours PRN, increase to 20mg if pain not controlled    No PCA for now    If pain worsening consider ketamin astarting at 4mg/h advancing to  only 6mg/mg    Zofran PRN    Senna    Trend hemoglobin, LDH, reticulocyte count    #sickle cell anemia  #Iron overload 2/2blood transfusion    Continue PTA Hydrea 2000mg/daily    Continue PTA [id number]/day    #Bronchial asthma    Continue PTA  albuterol    Continue PTA Symbicort    #Depression,    Continue PTA duloxetine 30mg/day    #Hx of CVA               Diet: Regular  DVT Prophylaxis: On heparin infusion  Lopez Catheter: Not present  Fluids: Ringer lactate   Central Lines: None  Code Status:  full    Clinically Significant Risk Factors Present on Admission          # Hypocalcemia: Ca = 8.3 mg/dL (Ref range: 8.5 - 10.1 mg/dL) and/or iCa = N/A on admission, will replace as needed    # Coagulation Defect: home medication list includes an anticoagulant medication  # Platelet Defect: home medication list includes an antiplatelet medication      Disposition Plan   Expected discharge: to be discharged once anticoagulation targets are achieved    The patient's care was discussed with the Attending Physician, Dr. Winters.    Lance Camarena MD   PGY1, IM  [id number]  Securely message with the Vocera Web Console (learn more here)  Text page via [employer] Paging/Directory    Please see sign in/sign out for up to date coverage information  ______________________________________________________________________    Chief Complaint   Dyspnea     History is obtained from the patient    History of Present Illness   Jennifer Cervantes is a 21 year old female with HgbSS c/b frequent pain crises (acute and chronic components), history of CVA  leading to significant cognitive delays and right upper extremity hemiparesis, iron overload 2/2 chronic transfusions, anxiety/depression, asthma, 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    Diagnosed with  PTE  On 2/1/21 started on rivaroxaban  Switched to apixaban on  3/25/21 with RUE DVT.  Developed worsening of PTE requiring admission from 4/26/21-5/11/21  In setting of low Apixaban levels. Admitted again From  7/13/21-7/25/21 for acute on chronic PE, switched to dabigitran + ASA.  Claims that she is taking the medicines properly at this time.  Presented with worsening of SOB on exertion of 1 month.  No chest pain.  Has left arm pain, aching 9/10 for the, no back or abdominal pain. Has history of recurrent sickle cell pain crisis.  No nausea or vomiting  Denies fever, cough    Review of Systems    The 10 point Review of Systems is negative other than noted in the HPI.    Past Medical History    I have reviewed this patient's medical history and updated it with pertinent information if needed.   Past Medical History:   Diagnosis Date     Anxiety      Bleeding disorder (H)      Blood clotting disorder (H)      Cerebral infarction (H) 2015     Cognitive developmental delay     low IQ. 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 (H)     right hand contractures     Iron overload due to repeated red blood cell transfusions      Migraines      Multiple subsegmental pulmonary emboli without acute cor pulmonale (H) 02/01/2021     Oppositional defiant behavior      Superficial venous thrombosis of arm, right 03/25/2021     Uncomplicated asthma        Past Surgical History   I have reviewed this patient's surgical history and updated it with pertinent information if needed.  Past Surgical History:   Procedure Laterality Date     AS INSERT TUNNELED CV 2 CATH W/O PORT/PUMP       C BREAST REDUCTION (INCLUDES LIPO) TIER 3 Bilateral 04/23/2019     CHOLECYSTECTOMY       INSERT PORT VASCULAR ACCESS Left 4/21/2021    Procedure: INSERTION, VASCULAR ACCESS PORT (NOT SURE ON SIDE UNTIL REMOVAL);  Surgeon: Rajan More MD;  [place] [facility] [place]     IR CHEST PORT PLACEMENT > 5 YRS OF AGE  4/21/2021      IR CVC NON TUNNEL LINE REMOVAL  5/6/2021     IR CVC NON TUNNEL PLACEMENT  04/07/2020     IR CVC NON TUNNEL PLACEMENT  4/30/2021     IR PORT REMOVAL LEFT  4/21/2021     REMOVE PORT VASCULAR ACCESS Left 4/21/2021    Procedure: REMOVAL, VASCULAR ACCESS PORT LEFT;  Surgeon: Rajan More MD;  Location: [facility] OR     REPAIR TENDON ELBOW Right 10/02/2019    Procedure: Right Elbow Flexor Lengthening, Flexor Pronator Slide Of Wrist and Finger, Thumb Adductor Lengthening;  Surgeon: Anai Franco MD;  Location: [facility] OR     TONSILLECTOMY Bilateral 10/02/2019    Procedure: Bilateral Tonsillectomy;  Surgeon: Farhana Guy MD;  Location: [facility] OR       Social History   I have reviewed this patient's social history and updated it with pertinent information if needed. Jennifer Cervanets  reports that she has never smoked. She has never used smokeless tobacco. She reports previous alcohol use. She reports that she does not use drugs.    Family History   I have reviewed this patient's family history and updated it with pertinent information if needed.  Family History   Problem Relation Age of Onset     Sickle Cell Trait Mother      Hypertension Mother      Asthma Mother      Sickle Cell Trait Father        Prior to Admission Medications   Prior to Admission Medications   Prescriptions Last Dose Informant Patient Reported? Taking?   ARIPiprazole (ABILIFY) 2 MG tablet   No No   Sig: Take 1 tablet (2 mg) by mouth daily   DULoxetine (CYMBALTA) 30 MG capsule   No No   Sig: Take 1 capsule (30 mg) by mouth 2 times daily   EPINEPHrine (ANY BX GENERIC EQUIV) 0.3 MG/0.3ML injection 2-pack  Self No No   Sig: Inject 0.3 mLs (0.3 mg) into the muscle as needed for anaphylaxis   Patient not taking: Reported on 11/9/2021   Hydroxyurea 1000 MG TABS   No No   Sig: Take 2,000 mg by mouth daily   JADENU 360 MG tablet   No No   Sig: Take 4 tablets (1,440 mg) by mouth every evening   acetaminophen (TYLENOL) 325 MG tablet   No No   Sig:  Take 2 tablets (650 mg) by mouth every 6 hours as needed for mild pain   albuterol (PROAIR HFA/PROVENTIL HFA/VENTOLIN HFA) 108 (90 Base) MCG/ACT inhaler   No No   Sig: Inhale 2 puffs into the lungs every 6 hours as needed for shortness of breath / dyspnea or wheezing   albuterol (PROVENTIL) (2.5 MG/3ML) 0.083% neb solution   No No   Sig: Take 1 vial (2.5 mg) by nebulization every 6 hours as needed for shortness of breath / dyspnea or wheezing   Patient not taking: Reported on 11/9/2021   aspirin (ASA) 81 MG chewable tablet   No No   Sig: Take 1 tablet (81 mg) by mouth daily   budesonide-formoterol (SYMBICORT) 160-4.5 MCG/ACT Inhaler   No No   Sig: Inhale 2 puffs into the lungs 2 times daily   dabigatran ANTICOAGULANT (PRADAXA) 150 MG capsule 11/10/2021 at Unknown time  No Yes   Sig: Take 1 capsule (150 mg) by mouth 2 times daily Store in original 's bottle or blister pack; use within 120 days of opening.   diphenhydrAMINE (BENADRYL) 25 MG capsule   No No   Sig: Take 1-2 capsules (25-50 mg) by mouth nightly as needed for sleep   hydrOXYzine (ATARAX) 25 MG tablet  Self No No   Sig: Take 1 tablet (25 mg) by mouth 3 times daily as needed for anxiety   lidocaine-prilocaine (EMLA) 2.5-2.5 % external cream   No No   Sig: Apply topically once for 1 dose Use for port site as needed   medroxyPROGESTERone (DEPO-PROVERA) 150 MG/ML IM injection   Yes No   Sig: Inject 150 mg into the muscle   morphine (MS CONTIN) 15 MG CR tablet   No No   Sig: Take 1 tablet (15 mg) by mouth every 12 hours Take 1/2 tablet every 12 hours initially   naloxone (NARCAN) 4 MG/0.1ML nasal spray  Self No No   Sig: Spray 1 spray (4 mg) into one nostril alternating nostrils once as needed for opioid reversal every 2-3 minutes until assistance arrives   Patient not taking: Reported on 11/9/2021   omeprazole (PRILOSEC) 20 MG DR capsule   No No   Sig: Take 1 capsule (20 mg) by mouth daily   ondansetron (ZOFRAN) 8 MG tablet   No No   Sig: Take 1  tablet (8 mg) by mouth every 8 hours as needed   oxyCODONE IR (ROXICODONE) 15 MG tablet   No No   Sig: Take 1 tablet (15mg) by mouth every 4-6 hours as needed for severe pain. Goal 4 per day. Max 6 per day.      Facility-Administered Medications Last Administration Doses Remaining   medroxyPROGESTERone (DEPO-PROVERA) syringe 150 mg 8/25/2021  8:56 AM 2        Allergies   Allergies   Allergen Reactions     Contrast Dye      Hives and breathing issues     Fish-Derived Products Hives     Seafood Hives     Diagnostic X-Ray Materials      Gadolinium        Physical Exam   Vital Signs: Temp: 98.6  F (37  C) Temp src: Oral BP: 133/83 Pulse: 102   Resp: 16 SpO2: 100 % O2 Device: Nasal cannula Oxygen Delivery: 2 LPM  Weight: 170 lbs 0 oz  Constitutional:   Awake and alert. Answers all questions appropriately.   Eyes:   No scleral icterus.    ENT and Mouth:   Wet buccal mucosa. Oropharynx clear.  Respiratory:   On intranasal oxygen. CTAB. . No crackles, wheezes, rhonchi or rales.  Cardiovascular:   No JVD, RRR. No murmurs, rubs or gallops.  GI:   Soft, nontender, nondistended. No guarding or rebound tenderness. No organomegaly.  Skin:   No rash. No ecchymoses or petechiae.  Musculoskeletal:   Normal muscle bulk and tone. Extremities warm and well perfused.   Neurologic:   AOx3.No focal deficits. Face symmetric. Residual right hemiparesis        Data   Data reviewed today: I reviewed all medications, new labs and imaging results over the last 24 hours.  Most Recent 3 CBC's:Recent Labs   Lab Test 11/10/21  1109 11/01/21  1228 10/19/21  1313   WBC 13.4* 12.4* 11.8*   HGB 8.0* 8.1* 7.8*   MCV 89 93 93    266 264     Most Recent 3 BMP's:Recent Labs   Lab Test 11/10/21  1109 11/01/21  1228 10/19/21  1313    137 138   POTASSIUM 3.8 3.8 3.9   CHLORIDE 112* 111* 112*   CO2 19* 20 21   BUN 12 6* 11   CR 0.49* 0.55 0.61   ANIONGAP 7 6 5   MICAH 8.3* 8.7 8.7   * 90 98     Most Recent 2 LFT's:Recent Labs   Lab Test  11/10/21  1109 11/01/21  1228 09/29/21  1153   AST  --  89* 84*   ALT 94* 61* 60*   ALKPHOS 84 83 82   BILITOTAL 3.8* 4.3* 4.9*     Most Recent 3 INR's:Recent Labs   Lab Test 11/10/21  1943 04/03/21  1910 03/28/21  1300   INR 1.23* 1.28* 1.26*     Most Recent 6 Bacteria Isolates From Any Culture (See EPIC Reports for Culture Details):Recent Labs   Lab Test 06/06/21  0430 05/07/21  1030 05/07/21  0905 04/29/21  2205 04/29/21  2049 04/28/21  1858   CULT No growth No growth No growth No growth No growth No growth     Most Recent Urinalysis:Recent Labs   Lab Test 08/29/21 0431   COLOR Light Yellow   APPEARANCE Clear   URINEGLC Negative   URINEBILI Negative   URINEKETONE Negative   SG 1.005   UBLD Negative   URINEPH 6.0   PROTEIN Negative   NITRITE Negative   LEUKEST Negative   RBCU <1   WBCU <1     Recent Results (from the past 24 hour(s))   XR Chest 2 Views    Narrative    Exam: XR CHEST 2 VW, 11/10/2021 11:56 AM    Indication: sickle cell, worsening SOB, r/o acute chest; Dyspnea,  unspecified type; Sickle cell pain crisis (H)    Comparison: Chest x-ray 9/20/2021    Findings:   Frontal and lateral view x-ray of the chest. Left chest wall  Port-A-Cath with tip projecting over the superior cavoatrial junction.  No acute osseous abnormality. No acute airspace opacity. No  appreciable pneumothorax or pleural effusion. Visualized upper abdomen  is within normal limits. Cardiomediastinal silhouette is within normal  limits.      Impression    Impression: No acute airspace opacity.    PIPPA LUNDBERG MD         SYSTEM ID:  [id number]   NM Lung Scan Perfusion Particulate   Result Value    Radiologist flags (Urgent)     At least 3 new perfusion defects suspicious for acute    Narrative    Examination:  NM LUNG SCAN PERFUSION PARTICULATE       Date:  11/10/2021 3:37 PM     Indication:    Respiratory failure; PE suspected, high prob; worsening  hypoxia, hx PE, assess for worsening PE burden; Dyspnea, unspecified  type; Sickle  cell pain crisis (H); Chronic pulmonary embolism,  unspecified pulmonary embolism type, unspecified whether acute cor  pulmonale present (H)     Previous Study: Nuclear medicine perfusion scan dated 8/7/2021    Additional Information: none    Technique:    The patient received 7 mCi of Tc-99m labeled MAA intravenously.  Ventilation imaging was deferred as per department policy precautions  during COVID-19 pandemic. A standard eight view lung perfusion scan  was obtained. SPECT images of the lungs were obtained. CT images of  the chest were obtained for the purposes of anatomic localization and  attenuation correction only.    Findings:    Planar images demonstrate multiple bilateral segmental and  subsegmental perfusion defects. Defects of the anterior superior and  medial mid right lung appear new. There also appears to be a new  defect in the anterolateral portion of the left inferior lung compared  to study dated 7/13/2021. No abnormal findings on CT in these regions  are identified    Fused SPECT-CT images demonstrate no pleural effusion, pneumothorax,  or focal consolidation. Left IJ Port-A-Cath with tip in the SVC.  Cholecystectomy clips. Redemonstration of lymphadenopathy in the  visualized upper abdomen.      Impression    Impression:  In the background of a few scattered chronic perfusion defects there  are at least three new perfusion defects which are suspicious for  acute on chronic pulmonary embolism.      [Urgent Result: At least 3 new perfusion defects suspicious for acute  on chronic pulmonary embolism]    Finding was identified on 11/10/2021 3:38 PM.     RONALDO Allan was contacted by Dr. Jitendra Payne at  11/10/2021 4:10 PM and verbalized understanding of the urgent finding.

## 2021-11-11 NOTE — PLAN OF CARE
"/64 (BP Location: Left arm)   Pulse 98   Temp 97.3  F (36.3  C) (Oral)   Resp 18   Ht 1.626 m (5' 4\")   Wt 79.4 kg (175 lb 0.7 oz)   SpO2 95%   BMI 30.05 kg/m        0730 - 1530    Neuro: A&Ox4.   Cardiac: SR. VSS.   Respiratory: Sating 87% room air, 94% @ 1.5 liter.   GI/: No bm.  Adequate urine output.   Diet/appetite: Pt decline solid intake. She took sips of clear liquids.  Activity: Stand by assist.  Pain: At acceptable level on current regimen.   Skin: No new deficits noted.  LDA's: Port a cath is infusing.  New this shift: Hep 10a was 0.97, held heparin gtt for 60 mins at 1100, then decreased by 200 unit/hr and restated at 1200. Now at 900 units/hr. Next level check at 1800. One time dose of Dilaudid was given with relief.    Plan: Continue with POC. Notify primary team with changes.  "

## 2021-11-11 NOTE — PROGRESS NOTES
Vascular access paged to put in a PIV for maintenance fluids which were not compatible with heparin which is infusing in her port a cath. [facility] attempted PIV access unsuccessfully. Pt did not want a second attempt. Primary RN and charge RN informed.

## 2021-11-11 NOTE — CONSULTS
Care Management Initial Consult    General Information  Assessment completed with: Patient,Care Team Member,-chart review,    Type of CM/SW Visit: Initial Assessment    Primary Care Provider verified and updated as needed: Yes   Readmission within the last 30 days:        Reason for Consult: care coordination/care conference  Advance Care Planning:            Communication Assessment  Patient's communication style: spoken language (English or Bilingual)    Hearing Difficulty or Deaf: no   Wear Glasses or Blind: no    Cognitive  Cognitive/Neuro/Behavioral: WDL  Level of Consciousness: alert  Arousal Level: arouses to voice  Orientation: oriented x 4  Mood/Behavior: flat affect  Best Language: 0 - No aphasia  Speech: clear,spontaneous,logical    Living Environment:   People in home: parent(s),sibling(s)     Current living Arrangements: house      Able to return to prior arrangements: yes       Family/Social Support:  Care provided by: self,parent(s) (Mom is pt PCA and ILS worker)  Provides care for: no one     Parent(s)          Description of Support System:           Current Resources:   Patient receiving home care services: No     Community Resources: County Worker,PCA  Equipment currently used at home: none  Supplies currently used at home: None    Employment/Financial:  Employment Status:          Financial Concerns: No concerns identified           Lifestyle & Psychosocial Needs:  Social Determinants of Health     Tobacco Use: Low Risk      Smoking Tobacco Use: Never Smoker     Smokeless Tobacco Use: Never Used   Alcohol Use: Not on file   Financial Resource Strain: Not on file   Food Insecurity: Not on file   Transportation Needs: Not on file   Physical Activity: Not on file   Stress: Not on file   Social Connections: Not on file   Intimate Partner Violence: Not At Risk     Fear of Current or Ex-Partner: No     Emotionally Abused: No     Physically Abused: No     Sexually Abused: No   Depression: Not at risk      PHQ-2 Score: 0   Housing Stability: Not on file             Additional Information:  Pt admitted with c/o shortness of breath.  Pt with a medical history significant for HgbSS c/b frequent pain crises (acute and chronic components), history of CVA  leading to significant cognitive delays and right upper extremity hemiparesis, iron overload 2/2 chronic transfusions, anxiety/depression, asthma, Recurrent PTE on dabigatran and  ASA.      CM Assessment d/t elevated readmission risk score.    Met with pt.  Introduced RNCC role.  Per pt she manages her own medications.  Pt mother is her PCA and ILS worker.  Pt notes no concerns at this time.  Pt is not currently receiving skilled home care services.  Pt notes no concerns at this time.      Awaiting anticoagulation plan.   Will continue to monitor.     1550: Received VM from Osvaldo Lockwood regarding home O2 set up.  Prior to patients hospitalization outpatient team was looking into home O2 set up for patient.   Will need to assess pt need for O2 when closer to discharge.       Cherelle Lockhart RN BSN, PHN, ACM-RN  7A RN Care Coordinator  Phone: [phone]  Pager [phone]  To contact the weekend RNCC, Page: [phone]    11/11/2021 2:40 PM

## 2021-11-11 NOTE — ED TRIAGE NOTES
"Pt comes in from clinic for new on chronic PE's. Pt short of breath, having pain \"all over\", hypoxic 87% on RA. 93% on 2L NC. On Pradaxa, last took yesterday.  "

## 2021-11-11 NOTE — CONSULTS
Hematology Consult Note   Date of Service: 11/11/2021    Patient: Jennifer Cervantes  MRN: [id number]  Admission Date: 11/10/2021  Hospital Day # Hospital Day: 2  Primary Outpatient Hematologist: Dr. 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   We recommend APS testing and changing to warfarin at this time.  No sign of acute chest (CXR yesterday no opacities).  Will treat PE and pain episode.       Recommendations:    General Sickle Cell Management   - Monitor CBC w/ diff, retic count, bilirubin daily  - Do NOT transfuse blood unless discussed with Hematology first. Do NOT transfuse based solely off hemoglobin level given risk of iron overload and risk of developing RBC antibodies. Low hemoglobin normal in sickle cell disease based off congenital anemia.   - Continue 2000 mg hydroxyurea  - Last crizanlizumab 10/19, next one due 11/16  - Voxeletor stopped   - Continue folic acid    Progressive PE:  -Continue heparin with bridge to warfarin (goal INR 2-3)  -I have ordered APS testing     Sickle Cell Pain Episode Control   - Continue aggressive pain control per primary team and care plan-Escalate oxycodone to 20 mg per pain plan   - Consider scheduled APAP, NSAIDs (ketorolac or ibuprofen), gabapentin,   - Consider topicals (lidocaine patch, diclofenac gel, BenGay, warm packs) if patient finds these helpful    Acute chest  prevention:  -CXR done yesterday (11/10) showed no acute airspace opacities   - Monitor O2 sats and vitals  - Encourage incentive spirometry q1h WA  - Encourage ambulation, consider PT consult  - Monitor for fluid overload and discontinue fluids if taking adequate po  - Please page hematology if patient develops fever, acute shortness of breath or other symptoms of acute chest syndrome.     Follow up  - Will continue to follow while inpatient.  - Next outpatient follow up to be arranged.      Patient was seen and plan of care was discussed with attending physician Dr. Villagran     We will continue to follow this patient. Please don't hesitate to contact the Fellow On-Call with questions.    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  This was changed to apixaban on 3/25/21 when she was found to have RUE DVT.  She had worsening PE 4/26/21 in setting of low apixaban level and she was switched back to rivaroxaban starting with loading dose x 21 days.       She was admitted 7/13/21-7/25/21 for acute on chronic PE, switched to dabigitran + ASA.  She was seen in clinic yesterday and reported ORTEGA, no SOB at rest.  She reported that she was compliant with dabigatran (pradaxa).  She underwent repeat NM lung scan and it showed  background of a few scattered chronic perfusion defects there are at least three new perfusion defects which are suspicious for acute on chronic pulmonary embolism.    She was started on heparin drip and is on 2 L via NC.  She reports ORTEGA, no CP, SOB at rest or fever.  She has sickle cell  pain as well not controlled with current regimen.      Hematologic History:  HbSS complicated by hx stroke, acute chest, iron overload, chronic pain, acute on chronic pulmonary embolism with multiple hospitalizations. She has been doing better recently with no ED visits, though she is in the infusion center almost daily.    Clot Hx:   Recurrent/progressive PE initially dx 2/2021  RUE DVT 3/25/21    Review of Systems: Pertinent positive and negative systems described in HPI; the remainder of the 14 systems are negative    Past Medical History:  Past Medical History:   Diagnosis Date     Anxiety      Bleeding disorder (H)      Blood clotting disorder (H)      Cerebral infarction (H) 2015     Cognitive developmental delay     low IQ. 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 (H)     right hand contractures     Iron overload due to repeated red blood cell transfusions      Migraines      Multiple subsegmental pulmonary emboli without acute cor pulmonale (H) 02/01/2021     Oppositional defiant behavior      Superficial venous thrombosis of arm, right 03/25/2021     Uncomplicated asthma        Past Surgical History:  Past Surgical History:   Procedure Laterality Date     AS INSERT TUNNELED CV 2 CATH W/O PORT/PUMP       C BREAST REDUCTION (INCLUDES LIPO) TIER 3 Bilateral 04/23/2019     CHOLECYSTECTOMY       INSERT PORT VASCULAR ACCESS Left 4/21/2021    Procedure: INSERTION, VASCULAR ACCESS PORT (NOT SURE ON SIDE UNTIL REMOVAL);  Surgeon: Rajan More MD;  Location: [facility] OR     IR CHEST PORT PLACEMENT > 5 YRS OF AGE  4/21/2021     IR CVC NON TUNNEL LINE REMOVAL  5/6/2021     IR CVC NON TUNNEL PLACEMENT  04/07/2020     IR CVC NON TUNNEL PLACEMENT  4/30/2021     IR PORT REMOVAL LEFT  4/21/2021     REMOVE PORT VASCULAR ACCESS Left 4/21/2021    Procedure: REMOVAL, VASCULAR ACCESS PORT LEFT;   "Surgeon: Rajan More MD;  Location: [facility] OR     REPAIR TENDON ELBOW Right 10/02/2019    Procedure: Right Elbow Flexor Lengthening, Flexor Pronator Slide Of Wrist and Finger, Thumb Adductor Lengthening;  Surgeon: Anai Franco MD;  Location: [facility] OR     TONSILLECTOMY Bilateral 10/02/2019    Procedure: Bilateral Tonsillectomy;  Surgeon: Farhana Guy MD;  Location: [facility] OR       Social History:  Social History     Socioeconomic History     Marital status: Single     Spouse name: None     Number of children: None     Years of education: None     Highest education level: None   Occupational History     None   Tobacco Use     Smoking status: Never Smoker     Smokeless tobacco: Never Used   Substance and Sexual Activity     Alcohol use: Not Currently     Alcohol/week: 0.0 standard drinks     Drug use: Never     Sexual activity: Not Currently     Partners: Male     Birth control/protection: Other   Other Topics Concern     Parent/sibling w/ CABG, MI or angioplasty before 65F 55M? Not Asked   Social History Narrative    Lives with mom and extended family but \"toxic environment\" per her report. She would like to move out but cannot financially do so. She has minimal support at home despite her significant SCD comorbidities and cognitive delay from stroke.     Social Determinants of Health     Financial Resource Strain: Not on file   Food Insecurity: Not on file   Transportation Needs: Not on file   Physical Activity: Not on file   Stress: Not on file   Social Connections: Not on file   Intimate Partner Violence: Not At Risk     Fear of Current or Ex-Partner: No     Emotionally Abused: No     Physically Abused: No     Sexually Abused: No   Housing Stability: Not on file        Family History  Family History   Problem Relation Age of Onset     Sickle Cell Trait Mother      Hypertension Mother      Asthma Mother      Sickle Cell Trait Father        Outpatient Medications:  [COMPLETED] heparin 100 UNIT/ML " injection 500 Units    acetaminophen (TYLENOL) 325 MG tablet, Take 2 tablets (650 mg) by mouth every 6 hours as needed for mild pain  albuterol (PROAIR HFA/PROVENTIL HFA/VENTOLIN HFA) 108 (90 Base) MCG/ACT inhaler, Inhale 2 puffs into the lungs every 6 hours as needed for shortness of breath / dyspnea or wheezing  ARIPiprazole (ABILIFY) 2 MG tablet, Take 1 tablet (2 mg) by mouth daily  aspirin (ASA) 81 MG chewable tablet, Take 1 tablet (81 mg) by mouth daily  budesonide-formoterol (SYMBICORT) 160-4.5 MCG/ACT Inhaler, Inhale 2 puffs into the lungs 2 times daily  dabigatran ANTICOAGULANT (PRADAXA) 150 MG capsule, Take 1 capsule (150 mg) by mouth 2 times daily Store in original 's bottle or blister pack; use within 120 days of opening.  diphenhydrAMINE (BENADRYL) 25 MG capsule, Take 1-2 capsules (25-50 mg) by mouth nightly as needed for sleep  DULoxetine (CYMBALTA) 30 MG capsule, Take 1 capsule (30 mg) by mouth 2 times daily  EPINEPHrine (ANY BX GENERIC EQUIV) 0.3 MG/0.3ML injection 2-pack, Inject 0.3 mLs (0.3 mg) into the muscle as needed for anaphylaxis  Hydroxyurea 1000 MG TABS, Take 2,000 mg by mouth daily  hydrOXYzine (ATARAX) 25 MG tablet, Take 1 tablet (25 mg) by mouth 3 times daily as needed for anxiety  JADENU 360 MG tablet, Take 4 tablets (1,440 mg) by mouth every evening  morphine (MS CONTIN) 15 MG CR tablet, Take 1 tablet (15 mg) by mouth every 12 hours Take 1/2 tablet every 12 hours initially  naloxone (NARCAN) 4 MG/0.1ML nasal spray, Spray 1 spray (4 mg) into one nostril alternating nostrils once as needed for opioid reversal every 2-3 minutes until assistance arrives  omeprazole (PRILOSEC) 20 MG DR capsule, Take 1 capsule (20 mg) by mouth daily  ondansetron (ZOFRAN) 8 MG tablet, Take 1 tablet (8 mg) by mouth every 8 hours as needed  oxyCODONE IR (ROXICODONE) 15 MG tablet, Take 1 tablet (15mg) by mouth every 4-6 hours as needed for severe pain. Goal 4 per day. Max 6 per day.  albuterol  "(PROVENTIL) (2.5 MG/3ML) 0.083% neb solution, Take 1 vial (2.5 mg) by nebulization every 6 hours as needed for shortness of breath / dyspnea or wheezing (Patient not taking: Reported on 11/10/2021)  lidocaine-prilocaine (EMLA) 2.5-2.5 % external cream, Apply topically once for 1 dose Use for port site as needed (Patient not taking: Reported on 11/10/2021)  medroxyPROGESTERone (DEPO-PROVERA) 150 MG/ML IM injection, Inject 150 mg into the muscle         Physical Exam:    /64 (BP Location: Left arm)   Pulse 98   Temp 97.3  F (36.3  C) (Oral)   Resp 18   Ht 1.626 m (5' 4\")   Wt 79.4 kg (175 lb 0.7 oz)   SpO2 95%   BMI 30.05 kg/m    Gen: Well appearing, in NAD.  Seen in hospital bed.   HEENT: EOMI, PERRL, mmm, oropharynx clear  CV: Normal rate, regular rhythm. No m/r/g  Pulm: CTAB, Wheezes in RUL and LLL, normal work of breathing. On 2L via NC   Abd: Soft, nt/nd, no rebound/guarding  Ext: Warm and well perfused. No lower extremity edema  Skin: No rash, cyanosis or petechial lesion  Neuro: Alert and answering questions appropriately.     Labs & Studies: I personally reviewed the following studies:  ROUTINE LABS (Last four results):  CMP  Recent Labs   Lab 11/11/21  0955 11/11/21  0112 11/10/21  1943 11/10/21  1109   NA  --   --  136 138   POTASSIUM 3.7  3.7 3.0* 3.3* 3.8   CHLORIDE  --   --  110* 112*   CO2  --   --  19* 19*   ANIONGAP  --   --  7 7   GLC  --   --  95 113*   BUN  --   --  11 12   CR  --   --  0.49* 0.49*   GFRESTIMATED  --   --  >90 >90   MICAH  --   --  8.5 8.3*   MAG 2.0  --   --   --    PROTTOTAL  --   --  7.6 7.8   ALBUMIN  --   --  3.8 3.9   BILITOTAL  --   --  3.5* 3.8*   ALKPHOS  --   --  79 84   AST  --   --  112*  --    ALT  --   --  85* 94*     CBC  Recent Labs   Lab 11/10/21  1943 11/10/21  1109   WBC 13.9* 13.4*   RBC 2.27* 2.35*   HGB 7.7* 8.0*   HCT 20.7* 21.0*   MCV 91 89   MCH 33.9* 34.0*   MCHC 37.2* 37.5*   RDW 26.9* 25.4*    286     INR  Recent Labs   Lab " 11/10/21  1943   INR 1.23*

## 2021-11-11 NOTE — PROGRESS NOTES
Admitted/transferred from: ED  Time of arrival on unit 2300  2 RN full  skin assessment completed by Allisno BYRNE RN, and Marcy MAX RN  Skin assessment finding: some scarring under breasts  Interventions/actions: no interventions necessary at this time    Will continue to monitor.

## 2021-11-12 ENCOUNTER — APPOINTMENT (OUTPATIENT)
Dept: CARDIOLOGY | Facility: CLINIC | Age: 21
DRG: 175 | End: 2021-11-12
Attending: INTERNAL MEDICINE
Payer: COMMERCIAL

## 2021-11-12 ENCOUNTER — APPOINTMENT (OUTPATIENT)
Dept: GENERAL RADIOLOGY | Facility: CLINIC | Age: 21
DRG: 175 | End: 2021-11-12
Attending: INTERNAL MEDICINE
Payer: COMMERCIAL

## 2021-11-12 LAB
ANION GAP SERPL CALCULATED.3IONS-SCNC: 6 MMOL/L (ref 3–14)
B2 GLYCOPROT1 IGG SERPL IA-ACNC: 3 U/ML
B2 GLYCOPROT1 IGM SERPL IA-ACNC: <2.4 U/ML
BUN SERPL-MCNC: 6 MG/DL (ref 7–30)
CALCIUM SERPL-MCNC: 8.4 MG/DL (ref 8.5–10.1)
CHLORIDE BLD-SCNC: 110 MMOL/L (ref 94–109)
CO2 SERPL-SCNC: 23 MMOL/L (ref 20–32)
CREAT SERPL-MCNC: 0.62 MG/DL (ref 0.52–1.04)
DRVVT SCREEN RATIO: 0.96
ERYTHROCYTE [DISTWIDTH] IN BLOOD BY AUTOMATED COUNT: 24.3 % (ref 10–15)
GFR SERPL CREATININE-BSD FRML MDRD: >90 ML/MIN/1.73M2
GLUCOSE BLD-MCNC: 84 MG/DL (ref 70–99)
HCT VFR BLD AUTO: 21.2 % (ref 35–47)
HEPZYMED PTT RATIO: 0.92
HEPZYMED PTT-LA: 35 SECONDS (ref 31–45)
HEPZYMED THROMBIN TIME: 17.9 SECONDS (ref 15.7–21.7)
HGB BLD-MCNC: 7.7 G/DL (ref 11.7–15.7)
INR PPP: 1.29 (ref 0.85–1.15)
LA PPP-IMP: NEGATIVE
LACTATE SERPL-SCNC: 0.7 MMOL/L (ref 0.7–2)
LUPUS INTERPRETATION: ABNORMAL
LVEF ECHO: NORMAL
MCH RBC QN AUTO: 33.5 PG (ref 26.5–33)
MCHC RBC AUTO-ENTMCNC: 36.3 G/DL (ref 31.5–36.5)
MCV RBC AUTO: 92 FL (ref 78–100)
PATIENT PTT-LA: 74 SECONDS (ref 31–45)
PLATELET # BLD AUTO: 316 10E3/UL (ref 150–450)
POTASSIUM BLD-SCNC: 3.6 MMOL/L (ref 3.4–5.3)
RBC # BLD AUTO: 2.3 10E6/UL (ref 3.8–5.2)
SODIUM SERPL-SCNC: 139 MMOL/L (ref 133–144)
THROMBIN TIME: >60 SECONDS (ref 13–19)
UFH PPP CHRO-ACNC: 0.31 IU/ML
UFH PPP CHRO-ACNC: 0.69 IU/ML
WBC # BLD AUTO: 10.8 10E3/UL (ref 4–11)

## 2021-11-12 PROCEDURE — 85520 HEPARIN ASSAY: CPT | Performed by: INTERNAL MEDICINE

## 2021-11-12 PROCEDURE — 99232 SBSQ HOSP IP/OBS MODERATE 35: CPT | Mod: GC | Performed by: INTERNAL MEDICINE

## 2021-11-12 PROCEDURE — 120N000002 HC R&B MED SURG/OB UMMC

## 2021-11-12 PROCEDURE — 85610 PROTHROMBIN TIME: CPT | Performed by: INTERNAL MEDICINE

## 2021-11-12 PROCEDURE — 999N000127 HC STATISTIC PERIPHERAL IV START W US GUIDANCE

## 2021-11-12 PROCEDURE — 93325 DOPPLER ECHO COLOR FLOW MAPG: CPT

## 2021-11-12 PROCEDURE — 71045 X-RAY EXAM CHEST 1 VIEW: CPT

## 2021-11-12 PROCEDURE — 250N000013 HC RX MED GY IP 250 OP 250 PS 637: Performed by: INTERNAL MEDICINE

## 2021-11-12 PROCEDURE — 93325 DOPPLER ECHO COLOR FLOW MAPG: CPT | Mod: 26 | Performed by: INTERNAL MEDICINE

## 2021-11-12 PROCEDURE — 93321 DOPPLER ECHO F-UP/LMTD STD: CPT | Mod: 26 | Performed by: INTERNAL MEDICINE

## 2021-11-12 PROCEDURE — 250N000009 HC RX 250: Performed by: INTERNAL MEDICINE

## 2021-11-12 PROCEDURE — 250N000009 HC RX 250

## 2021-11-12 PROCEDURE — 99233 SBSQ HOSP IP/OBS HIGH 50: CPT | Performed by: INTERNAL MEDICINE

## 2021-11-12 PROCEDURE — 36591 DRAW BLOOD OFF VENOUS DEVICE: CPT

## 2021-11-12 PROCEDURE — 71045 X-RAY EXAM CHEST 1 VIEW: CPT | Mod: 26 | Performed by: RADIOLOGY

## 2021-11-12 PROCEDURE — 85027 COMPLETE CBC AUTOMATED: CPT

## 2021-11-12 PROCEDURE — 258N000003 HC RX IP 258 OP 636: Performed by: INTERNAL MEDICINE

## 2021-11-12 PROCEDURE — 80048 BASIC METABOLIC PNL TOTAL CA: CPT

## 2021-11-12 PROCEDURE — 250N000013 HC RX MED GY IP 250 OP 250 PS 637: Performed by: EMERGENCY MEDICINE

## 2021-11-12 PROCEDURE — 93308 TTE F-UP OR LMTD: CPT | Mod: 26 | Performed by: INTERNAL MEDICINE

## 2021-11-12 PROCEDURE — 250N000013 HC RX MED GY IP 250 OP 250 PS 637

## 2021-11-12 PROCEDURE — 250N000011 HC RX IP 250 OP 636: Performed by: INTERNAL MEDICINE

## 2021-11-12 RX ORDER — WARFARIN SODIUM 5 MG/1
5 TABLET ORAL
Status: COMPLETED | OUTPATIENT
Start: 2021-11-12 | End: 2021-11-12

## 2021-11-12 RX ADMIN — PANTOPRAZOLE SODIUM 40 MG: 40 TABLET, DELAYED RELEASE ORAL at 14:03

## 2021-11-12 RX ADMIN — HYDROXYUREA 2000 MG: 500 CAPSULE ORAL at 14:04

## 2021-11-12 RX ADMIN — HYDROMORPHONE HYDROCHLORIDE 0.5 MG: 1 INJECTION, SOLUTION INTRAMUSCULAR; INTRAVENOUS; SUBCUTANEOUS at 00:19

## 2021-11-12 RX ADMIN — MORPHINE SULFATE 15 MG: 15 TABLET, EXTENDED RELEASE ORAL at 22:09

## 2021-11-12 RX ADMIN — DEFERASIROX 1440 MG: 360 TABLET ORAL at 21:23

## 2021-11-12 RX ADMIN — OXYCODONE HYDROCHLORIDE 20 MG: 10 TABLET ORAL at 02:06

## 2021-11-12 RX ADMIN — HYDROMORPHONE HYDROCHLORIDE 0.5 MG: 1 INJECTION, SOLUTION INTRAMUSCULAR; INTRAVENOUS; SUBCUTANEOUS at 16:34

## 2021-11-12 RX ADMIN — ACETAMINOPHEN 975 MG: 325 TABLET, FILM COATED ORAL at 02:06

## 2021-11-12 RX ADMIN — DULOXETINE 30 MG: 30 CAPSULE, DELAYED RELEASE ORAL at 21:23

## 2021-11-12 RX ADMIN — MORPHINE SULFATE 15 MG: 15 TABLET, EXTENDED RELEASE ORAL at 10:28

## 2021-11-12 RX ADMIN — HYDROMORPHONE HYDROCHLORIDE 0.5 MG: 1 INJECTION, SOLUTION INTRAMUSCULAR; INTRAVENOUS; SUBCUTANEOUS at 10:24

## 2021-11-12 RX ADMIN — ACETAMINOPHEN 975 MG: 325 TABLET, FILM COATED ORAL at 14:08

## 2021-11-12 RX ADMIN — KETAMINE HYDROCHLORIDE 4 MG/HR: 100 INJECTION, SOLUTION, CONCENTRATE INTRAMUSCULAR; INTRAVENOUS at 21:15

## 2021-11-12 RX ADMIN — DULOXETINE 30 MG: 30 CAPSULE, DELAYED RELEASE ORAL at 14:02

## 2021-11-12 RX ADMIN — ALBUTEROL SULFATE 2.5 MG: 2.5 SOLUTION RESPIRATORY (INHALATION) at 13:56

## 2021-11-12 RX ADMIN — WARFARIN SODIUM 5 MG: 5 TABLET ORAL at 18:26

## 2021-11-12 ASSESSMENT — ACTIVITIES OF DAILY LIVING (ADL)
ADLS_ACUITY_SCORE: 5

## 2021-11-12 NOTE — PROGRESS NOTES
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 Hematology recommends coumadin with INR goal 2-3 and bridging with heparin and have ordered APS workup. Pain meds being adjusted for diffuse body pain. No evidence of acute chest.     Today:  - Check ECHO to assess RV function  - Patient states pain control is adequate on current meds(taking the PRN IV dilaudid preferably over the oral oxycodone)  - Check CXR    #Acute hypoxic respiratory failure  #Recurrent Pulm Embolism  Diagnosed with  PE On 2/1/21 started on rivaroxaban  Switched to apixaban on  3/25/21 with RUE DVT. Developed worsening of PE requiring admission from 4/26/21-5/11/21  in setting of low Apixaban levels. Admitted again From  7/13/21-7/25/21 for acute on chronic PE, switched to dabigitran + ASA.  Claims that she is taking the medicines properly at this time.  Presented with worsening of SOB  of 1 month.  No chest pain. NM perfusion scan on 11/10/21 showed  at least 3 new perfusion defects In the background of chronic perfusion defects which are suspicion for acute on chronic PE. These developed in the setting of dabiagtan and ASA which is concerning for anticoagulation failure. Appreciate hematology input. Start coumadin and bridge with high intensity heparin. APS workup ordered  by hematology.     #Left arm pain, diffuse joint pain all over body, no evidence of acute chest syndrome, Acute pain crisis with known h/o Sickle Cell Disease:   Hgb at baseline, CXR w/o infiltrates  Plan:    Decrease Ringer lactate to 75 ml/hr    Continue PTA MS contin 15mg CR BID    Increase Oxycodone to 20mg PO  Q4 hours PRN    No PCA for now    IV dilaudid 0.5 mg q 6 h prn (taper to off ASAP)    Zofran PRN    Senna    Trend hemoglobin, LDH, reticulocyte count     #sickle cell anemia  #Iron overload 2/2blood transfusion    Continue PTA Hydrea 2000mg/daily    Continue PTA [id number]     #Bronchial asthma    Continue PTA  albuterol    Continue PTA Symbicort     #Depression,    Continue PTA duloxetine 30mg/day     #Hx of CVA            Diet: Combination Diet Regular Diet Adult    DVT Prophylaxis: Warfarin  Lopez Catheter: Not present  Central Lines: None  Code Status: Full Code      Disposition Plan   Expected discharge: 11/15/2021   recommended to prior living arrangement once stable on room air and adequate pain control and INR therapeutic.     The patient's care was discussed with the Care Coordinator/ and Patient.    Terese Arroyo MD  Hospitalist Service, [address]  Securely message with the Vocera Web Console (learn more here)  Text page via [place] Paging/Directory    Please see sign in/sign out for up to date coverage information    Clinically Significant Risk Factors Present on Admission              ______________________________________________________________________    Interval History   Patient resting comfortably. Sleeping most of the time per staff. ROS neg    Data reviewed today: I reviewed all medications, new labs and imaging results over the last 24 hours.     Physical Exam   Vital Signs: Temp: 97  F (36.1  C) Temp src: Oral BP: 121/61 Pulse: 101   Resp: 19 SpO2: 99 % O2 Device: Nasal cannula Oxygen Delivery: 3 LPM  Weight: 170  lbs 0 oz  General Appearance: Well built and nourished  Respiratory: wheezing b/l bases  Cardiovascular: S1S2 normal RRR  GI: soft NT  Skin: NAD  Other: aaox3 moving all 4 ext spont     Data   Recent Labs   Lab 11/12/21  0607 11/11/21  1644 11/11/21  0955 11/11/21  0112 11/10/21  1943 11/10/21  1109   WBC 10.8  --   --   --  13.9* 13.4*   HGB 7.7*  --   --   --  7.7* 8.0*   MCV 92  --   --   --  91 89     --   --   --  281 286   INR 1.29* 1.24*  --   --  1.23*  --      --   --   --  136 138   POTASSIUM 3.6  --  3.7  3.7   < > 3.3* 3.8   CHLORIDE 110*  --   --   --  110* 112*   CO2 23  --   --   --  19* 19*   BUN 6*  --   --   --  11 12   CR 0.62  --   --   --  0.49* 0.49*   ANIONGAP 6  --   --   --  7 7   MICAH 8.4*  --   --   --  8.5 8.3*   GLC 84  --   --   --  95 113*   ALBUMIN  --   --   --   --  3.8 3.9   PROTTOTAL  --   --   --   --  7.6 7.8   BILITOTAL  --   --   --   --  3.5* 3.8*   ALKPHOS  --   --   --   --  79 84   ALT  --   --   --   --  85* 94*   AST  --   --   --   --  112*  --    TROPONIN  --   --   --   --  <0.015 <0.015    < > = values in this interval not displayed.     No results found for this or any previous visit (from the past 24 hour(s)).  Medications     heparin 900 Units/hr (11/12/21 0858)     - MEDICATION INSTRUCTIONS -       Warfarin Therapy Reminder         acetaminophen  975 mg Oral Q8H     deferasirox  1,440 mg Oral QPM     DULoxetine  30 mg Oral BID     fluticasone-vilanterol  1 puff Inhalation Daily     hydroxyurea  2,000 mg Oral Daily     morphine  15 mg Oral Q12H     pantoprazole  40 mg Oral QAM AC     sodium chloride (PF)  3 mL Intracatheter Q8H

## 2021-11-12 NOTE — PHARMACY-ANTICOAGULATION SERVICE
Clinical Pharmacy - Warfarin Dosing Consult     Pharmacy has been consulted to manage this patient s warfarin therapy.  Indication: DVT/ PE Treatment  Therapy Goal: INR 2-3  Warfarin Prior to Admission: No  Significant drug interactions: heparin drip, Cymbalta    INR   Date Value Ref Range Status   11/11/2021 1.24 (H) 0.85 - 1.15 Final   11/10/2021 1.23 (H) 0.85 - 1.15 Final       Recommend warfarin 5 mg today.  Pharmacy will monitor Jennifer I Billy daily and order warfarin doses to achieve specified goal.      Please contact pharmacy as soon as possible if the warfarin needs to be held for a procedure or if the warfarin goals change.      Chhaya Harman, PharmD

## 2021-11-12 NOTE — PROGRESS NOTES
This is a recent snapshot of the patient's [facility] medical record.  For current drug dose and complete information and questions, call [phone] or In Basket pool, [facility] ([id number])  CSN Number:  [id number]

## 2021-11-12 NOTE — PROGRESS NOTES
Patient is alert and oriented, up to BR with stand by assist, right arm weakness, on 3 liters and sating in the upper 90's.  Hep Xa= 0.69, no change in rate, Hep gtt at 900 ml and recheck of  Xa will be in the morning, appetite is fair.  IV dilaudid given X 1, and patient declined oxycodone but was able to take scheduled tylenol , all the other meds were given late because she did not want to be bothered.  MIV has been discontinued. Patient had heart echo and chest xray done by the bedside.  Continue to monitor.

## 2021-11-12 NOTE — PROGRESS NOTES
Hematology Consult Note   Date of Service: 11/12/2021    Patient: Jennifer Cervantes  MRN: [id number]  Admission Date: 11/10/2021  Hospital Day # Hospital Day: 3  Primary Outpatient Hematologist: Dr. 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   We recommend APS testing and changing to warfarin at this time.  No sign of acute chest (CXR yesterday no opacities).  Will treat PE and pain episode.       Recommendations:    General Sickle Cell Management   - Monitor CBC w/ diff, retic count, bilirubin daily  - Do NOT transfuse blood unless discussed with Hematology first. Do NOT transfuse based solely off hemoglobin level given risk of iron overload and risk of developing RBC antibodies. Low hemoglobin normal in sickle cell disease based off congenital anemia.   - Continue 2000 mg hydroxyurea  - Last crizanlizumab 10/19, next one due 11/16  - Voxeletor stopped   - Continue folic acid    Progressive PE:  -Continue heparin with bridge to warfarin (goal INR 2-3)  -I have ordered APS testing     Sickle Cell Pain Episode Control   - discontinue dilaudid, continue oxycodone, start ketamine drip at 4mg/h, can titrate up to 6mg/h  - Consider scheduled APAP, NSAIDs (ketorolac or ibuprofen), gabapentin,   - Consider topicals (lidocaine patch, diclofenac gel, BenGay, warm packs) if patient finds these helpful    Acute chest prevention:  -CXR  "done yesterday (11/10) showed no acute airspace opacities   - Monitor O2 sats and vitals  - Encourage incentive spirometry q1h WA  - Encourage ambulation, consider PT consult  - Monitor for fluid overload and discontinue fluids if taking adequate po  - Please page hematology if patient develops fever, acute shortness of breath or other symptoms of acute chest syndrome.     Follow up  - Will continue to follow while inpatient.  - Next outpatient follow up to be arranged.      Patient was seen and plan of care was discussed with attending physician Dr. Villagran     We will continue to follow this patient. Please don't hesitate to contact the Fellow On-Call with questions.    Acacia Gomez MD  Hematology/Oncology Fellow  [phone]    Subjective:    Continues in severe pain all over her body despite increase in oxycodone.  She is asking about using ketamine, which is part of her care plan.  SOB stable, no chest pain, no other acute events.    Review of Systems: Pertinent positive and negative systems described in HPI; the remainder of the 14 systems are negative       Physical Exam:    /76 (BP Location: Left arm)   Pulse 102   Temp 97.8  F (36.6  C) (Oral)   Resp 18   Ht 1.626 m (5' 4\")   Wt 77.1 kg (170 lb)   SpO2 98%   BMI 29.18 kg/m    Gen: laying in bed in mild distress  HEENT: EOMI, PERRL, mmm, oropharynx clear  Pulm: normal WOB  Abd: Soft, nt/nd, no rebound/guarding  Ext: Warm and well perfused. No lower extremity edema  Skin: No rash, cyanosis or petechial lesion  Neuro: Alert and answering questions appropriately.     Labs & Studies: I personally reviewed the following studies:  ROUTINE LABS (Last four results):  CMP  Recent Labs   Lab 11/12/21  0607 11/11/21  0955 11/11/21  0112 11/10/21  1943 11/10/21  1109     --   --  136 138   POTASSIUM 3.6 3.7  3.7 3.0* 3.3* 3.8   CHLORIDE 110*  --   --  110* 112*   CO2 23  --   --  19* 19*   ANIONGAP 6  --   --  7 7   GLC 84  --   --  95 113*   BUN 6*  --  "  --  11 12   CR 0.62  --   --  0.49* 0.49*   GFRESTIMATED >90  --   --  >90 >90   MICAH 8.4*  --   --  8.5 8.3*   MAG  --  2.0  --   --   --    PROTTOTAL  --   --   --  7.6 7.8   ALBUMIN  --   --   --  3.8 3.9   BILITOTAL  --   --   --  3.5* 3.8*   ALKPHOS  --   --   --  79 84   AST  --   --   --  112*  --    ALT  --   --   --  85* 94*     CBC  Recent Labs   Lab 11/12/21  0607 11/10/21  1943 11/10/21  1109   WBC 10.8 13.9* 13.4*   RBC 2.30* 2.27* 2.35*   HGB 7.7* 7.7* 8.0*   HCT 21.2* 20.7* 21.0*   MCV 92 91 89   MCH 33.5* 33.9* 34.0*   MCHC 36.3 37.2* 37.5*   RDW 24.3* 26.9* 25.4*    281 286     INR  Recent Labs   Lab 11/12/21  0607 11/11/21  1644 11/10/21  1943   INR 1.29* 1.24* 1.23*

## 2021-11-12 NOTE — PLAN OF CARE
[id number]:     Temp: 97.8 (oral)   BP: 137/72  Pulse: 96  Resp Rate: 20  SpO2: 93% on 3LPM via NC       NEURO: Intact, A&O x4     RESPIRATORY:  Pt denies having any SOB. Supplemental oxygen @ 3LPM via NC with sats in mid 90's. Pt occasionally removing NC throughout the night and desating down to mid to low 80's. Pt educated on importance of wearing NC at all times to keep sats up.     CARDIO: WDL.      GI/:  WDL. Pt voiding spontaneously with AUOP. Last BM: 11/10/21.      SKIN: WDL.       ACTIVITY: Stand by assist d/t generalized weakness, pt has RUE deficit from hx of CVA.      PAIN: Pt reports generalized pain, 7-9/10. Scheduled acetaminophen given along with IV dilaudid x1 and PO oxycodone x1, medications where some what effective and pt was able to sleep.      LDA: Port (right) - 1/2 NS @ 50ml/hr. Heparin drip @ 900 units/hr (9ml/hr). 10a @ 0000 = 0.31 (3rd 10a in goal range [0.3-0.7]) recheck drawn with AM labs. 10a with AM labs = 0.69 (in goal range) next recheck 11/13/21 AM lab draw. No adjustments to heparin drip needed.        -- Pt triggered sepsis @ 2320, lactic acid = 0.7

## 2021-11-12 NOTE — PLAN OF CARE
1500- 1900    VSS on 1.5 L NC. LS clear and diminished. C/o SOB, denies dyspnea. Pt reports pain all over body; PRN Oxy and IV Dilaudid given, pt refused Tylenol. Pt denies N/V. Snacked throughout shift. Port accessed- MIVF changed to 75 ml/hr. Heparin drip 900 units/hr; Xa- 0.42 @ 1800; recheck at 0000. Pt will be transferring to 7D shortly- gave report. Continue POC.

## 2021-11-13 LAB
ANION GAP SERPL CALCULATED.3IONS-SCNC: 6 MMOL/L (ref 3–14)
BUN SERPL-MCNC: 10 MG/DL (ref 7–30)
CALCIUM SERPL-MCNC: 8.5 MG/DL (ref 8.5–10.1)
CHLORIDE BLD-SCNC: 110 MMOL/L (ref 94–109)
CO2 SERPL-SCNC: 23 MMOL/L (ref 20–32)
CREAT SERPL-MCNC: 0.52 MG/DL (ref 0.52–1.04)
ERYTHROCYTE [DISTWIDTH] IN BLOOD BY AUTOMATED COUNT: 24.2 % (ref 10–15)
GFR SERPL CREATININE-BSD FRML MDRD: >90 ML/MIN/1.73M2
GLUCOSE BLD-MCNC: 102 MG/DL (ref 70–99)
HCT VFR BLD AUTO: 19 % (ref 35–47)
HGB BLD-MCNC: 6.9 G/DL (ref 11.7–15.7)
INR PPP: 1.29 (ref 0.85–1.15)
LACTATE SERPL-SCNC: 0.8 MMOL/L (ref 0.7–2)
MCH RBC QN AUTO: 33 PG (ref 26.5–33)
MCHC RBC AUTO-ENTMCNC: 36.3 G/DL (ref 31.5–36.5)
MCV RBC AUTO: 91 FL (ref 78–100)
PLATELET # BLD AUTO: 270 10E3/UL (ref 150–450)
POTASSIUM BLD-SCNC: 4.6 MMOL/L (ref 3.4–5.3)
RBC # BLD AUTO: 2.09 10E6/UL (ref 3.8–5.2)
SODIUM SERPL-SCNC: 139 MMOL/L (ref 133–144)
UFH PPP CHRO-ACNC: 0.28 IU/ML
UFH PPP CHRO-ACNC: 0.73 IU/ML
UFH PPP CHRO-ACNC: 0.78 IU/ML
WBC # BLD AUTO: 12.1 10E3/UL (ref 4–11)

## 2021-11-13 PROCEDURE — 258N000003 HC RX IP 258 OP 636: Performed by: EMERGENCY MEDICINE

## 2021-11-13 PROCEDURE — 99221 1ST HOSP IP/OBS SF/LOW 40: CPT | Performed by: ANESTHESIOLOGY

## 2021-11-13 PROCEDURE — 85520 HEPARIN ASSAY: CPT | Performed by: INTERNAL MEDICINE

## 2021-11-13 PROCEDURE — 250N000013 HC RX MED GY IP 250 OP 250 PS 637: Performed by: EMERGENCY MEDICINE

## 2021-11-13 PROCEDURE — 999N000127 HC STATISTIC PERIPHERAL IV START W US GUIDANCE

## 2021-11-13 PROCEDURE — 36591 DRAW BLOOD OFF VENOUS DEVICE: CPT | Performed by: STUDENT IN AN ORGANIZED HEALTH CARE EDUCATION/TRAINING PROGRAM

## 2021-11-13 PROCEDURE — 85014 HEMATOCRIT: CPT | Performed by: EMERGENCY MEDICINE

## 2021-11-13 PROCEDURE — 250N000013 HC RX MED GY IP 250 OP 250 PS 637: Performed by: INTERNAL MEDICINE

## 2021-11-13 PROCEDURE — 99233 SBSQ HOSP IP/OBS HIGH 50: CPT | Performed by: INTERNAL MEDICINE

## 2021-11-13 PROCEDURE — 250N000011 HC RX IP 250 OP 636: Performed by: INTERNAL MEDICINE

## 2021-11-13 PROCEDURE — 250N000013 HC RX MED GY IP 250 OP 250 PS 637

## 2021-11-13 PROCEDURE — 80048 BASIC METABOLIC PNL TOTAL CA: CPT | Performed by: INTERNAL MEDICINE

## 2021-11-13 PROCEDURE — 258N000003 HC RX IP 258 OP 636: Performed by: INTERNAL MEDICINE

## 2021-11-13 PROCEDURE — 120N000002 HC R&B MED SURG/OB UMMC

## 2021-11-13 PROCEDURE — 36591 DRAW BLOOD OFF VENOUS DEVICE: CPT | Performed by: EMERGENCY MEDICINE

## 2021-11-13 PROCEDURE — 250N000011 HC RX IP 250 OP 636: Performed by: EMERGENCY MEDICINE

## 2021-11-13 PROCEDURE — 36591 DRAW BLOOD OFF VENOUS DEVICE: CPT | Performed by: INTERNAL MEDICINE

## 2021-11-13 PROCEDURE — 85610 PROTHROMBIN TIME: CPT | Performed by: INTERNAL MEDICINE

## 2021-11-13 PROCEDURE — 99207 PR CONSULT E&M CHANGED TO INITIAL LEVEL: CPT | Performed by: ANESTHESIOLOGY

## 2021-11-13 PROCEDURE — 83605 ASSAY OF LACTIC ACID: CPT | Performed by: STUDENT IN AN ORGANIZED HEALTH CARE EDUCATION/TRAINING PROGRAM

## 2021-11-13 RX ORDER — CEFTRIAXONE 1 G/1
1 INJECTION, POWDER, FOR SOLUTION INTRAMUSCULAR; INTRAVENOUS EVERY 24 HOURS
Status: DISCONTINUED | OUTPATIENT
Start: 2021-11-13 | End: 2021-11-16

## 2021-11-13 RX ORDER — ARIPIPRAZOLE 2 MG/1
2 TABLET ORAL DAILY
Status: DISCONTINUED | OUTPATIENT
Start: 2021-11-13 | End: 2021-11-21 | Stop reason: HOSPADM

## 2021-11-13 RX ADMIN — DEFERASIROX 1440 MG: 360 TABLET ORAL at 20:36

## 2021-11-13 RX ADMIN — HEPARIN SODIUM 900 UNITS/HR: 1000 INJECTION INTRAVENOUS; SUBCUTANEOUS at 01:22

## 2021-11-13 RX ADMIN — HYDROXYUREA 2000 MG: 500 CAPSULE ORAL at 08:55

## 2021-11-13 RX ADMIN — DULOXETINE 30 MG: 30 CAPSULE, DELAYED RELEASE ORAL at 08:55

## 2021-11-13 RX ADMIN — CEFTRIAXONE 1 G: 1 INJECTION, POWDER, FOR SOLUTION INTRAMUSCULAR; INTRAVENOUS at 12:44

## 2021-11-13 RX ADMIN — Medication 6.25 MG: at 18:41

## 2021-11-13 RX ADMIN — MORPHINE SULFATE 15 MG: 15 TABLET, EXTENDED RELEASE ORAL at 10:15

## 2021-11-13 RX ADMIN — ARIPIPRAZOLE 2 MG: 2 TABLET ORAL at 15:22

## 2021-11-13 RX ADMIN — OXYCODONE HYDROCHLORIDE 20 MG: 10 TABLET ORAL at 04:57

## 2021-11-13 RX ADMIN — MORPHINE SULFATE 15 MG: 15 TABLET, EXTENDED RELEASE ORAL at 22:21

## 2021-11-13 RX ADMIN — ACETAMINOPHEN 650 MG: 325 TABLET, FILM COATED ORAL at 04:57

## 2021-11-13 RX ADMIN — AZITHROMYCIN MONOHYDRATE 500 MG: 500 INJECTION, POWDER, LYOPHILIZED, FOR SOLUTION INTRAVENOUS at 15:28

## 2021-11-13 RX ADMIN — DULOXETINE 30 MG: 30 CAPSULE, DELAYED RELEASE ORAL at 20:36

## 2021-11-13 RX ADMIN — PANTOPRAZOLE SODIUM 40 MG: 40 TABLET, DELAYED RELEASE ORAL at 08:54

## 2021-11-13 ASSESSMENT — ACTIVITIES OF DAILY LIVING (ADL)
ADLS_ACUITY_SCORE: 5
ADLS_ACUITY_SCORE: 6
ADLS_ACUITY_SCORE: 5
ADLS_ACUITY_SCORE: 6
ADLS_ACUITY_SCORE: 5
ADLS_ACUITY_SCORE: 5
ADLS_ACUITY_SCORE: 6
ADLS_ACUITY_SCORE: 5
ADLS_ACUITY_SCORE: 5
ADLS_ACUITY_SCORE: 6
ADLS_ACUITY_SCORE: 5
ADLS_ACUITY_SCORE: 5
ADLS_ACUITY_SCORE: 6
ADLS_ACUITY_SCORE: 5
ADLS_ACUITY_SCORE: 6
ADLS_ACUITY_SCORE: 5

## 2021-11-13 ASSESSMENT — MIFFLIN-ST. JEOR: SCORE: 1514

## 2021-11-13 NOTE — PROVIDER NOTIFICATION
"Terese Arroyo MD paged at [phone]    \"Pt is endorsing \"9/10\" pain w/minimal relief from cont. ketamine infusion @ 4mg/hr.   Says PRN oxy and tylenol is providing minimal relief. Wondering what else we can be doing to manage pain? Thanks    pager #[phone]\"  "

## 2021-11-13 NOTE — PLAN OF CARE
[id number]    VSS. 3L O2 via nasal cannula used intermittently. Continuous pulse ox monitoring continued. Denies nausea. C/o pain, ketamine drip continued. Gave prn tylenol and oxycodone with little relief. MD aware that pt feels pain is not controlled. Heparin drip continued. Stopped per protocol and recheck scheduled for 1415. Pt triggered sepsis with AM labs, awaiting lactic acid result. Continue with plan of care.

## 2021-11-13 NOTE — PROVIDER NOTIFICATION
"Provider Notification      Suraj Normanberg ([id number]) pagedat [phone]:   \"Hi, pt feels that pain is not being controlled with ketamine drip, prn tylenol, and prn oxycodone. Is there anything else that we can do? Thanks!\"    Response: day team is aware of pt's concerns.       Will continue with plan of care.   "

## 2021-11-13 NOTE — PLAN OF CARE
"BP (!) 162/85 (BP Location: Left leg)   Pulse 100   Temp 98  F (36.7  C) (Oral)   Resp 16   Ht 1.626 m (5' 4\")   Wt 76.9 kg (169 lb 8.5 oz)   SpO2 92%   BMI 29.10 kg/m      AVSS on 2L NC. c/o pain 10/10 but seemed unwilling to take PRN meds available, adamant on dilaudid. Pain team notified. Recommendations were to increase Ketamine drip to 5 mg at 2.5 ml/hr. No change in pain. Heparin Xa was 0.78, RN managed protocol in place, rate at 700 units/hr.Denies nausea. No BM this shift. Voiding adequately. Right sided hemiparesis with right arm contractures. Patient walks with a limp but is able to ambulate. Continue with POC.  "

## 2021-11-13 NOTE — CONSULTS
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 At the time of this pain consult the patient was on her PTA dose of MS contin (15 mg BID), oxycodone 20 mg PO q4h PRN, IV dilaudid 0.5 mg q6h PRN and a ketamine infusion at 4 mg/hr.    1. Increase ketamine to 8 mg/hr. She denies vivid dreams and hallucinations at this time. We counseled her to tell her nurse if she starts experiencing these.   2. Discontinue PO oxycodone and start PO dilaudid 4-6 mg q3h PRN.  3. Discontinue IV dilaudid.  4. Consider starting IV lidocaine @ 1 mg/kg/hr ideal body weight. We counseled her to let her nurse know if she experiences tinnitus or circumoral numbness.  5. Continue scheduled acetaminophen. We discussed with the patient the benefits of taking scheduled tylenol.     Pain Service will Continue to follow at this time.     Recommendations were discussed with Dr. Arroyo  Plan was reviewed by the Pain Service staff anesthesiologist Dr. Tom    Thank " "you for consulting the Inpatient Pain Management Service.  The above recommendations are to be acted upon at the primary team s discretion.      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 Also suffering from a pain crisis.     According to the H&P   \"Jennifer Cervantes is a 21 year old female with HgbSS c/b frequent pain crises (acute and chronic components), history of CVA  leading to significant cognitive delays and right upper extremity hemiparesis, iron overload 2/2 chronic transfusions, anxiety/depression, asthma, 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     Diagnosed with  PTE  On 2/1/21 started on rivaroxaban  Switched to apixaban on  3/25/21 with RUE DVT. Developed worsening of PTE requiring admission from 4/26/21-5/11/21  In setting of low Apixaban levels. Admitted again From  7/13/21-7/25/21 for acute on chronic PE, switched to dabigitran + ASA.  Claims that she is taking the medicines properly at this time.  Presented with worsening of SOB on exertion of 1 month.  No chest pain.  Has left arm pain, aching 9/10 for the, no back or abdominal pain. Has history of recurrent sickle cell pain crisis.  No nausea or vomiting  Denies fever, cough\"    Current analgesic use: PTA MS contin (15 mg BID), oxycodone 20 mg PO q4h PRN, IV dilaudid 0.5 mg q6h PRN and a ketamine infusion at 4 mg/hr.      PAIN " "HISTORY: Jennifer states her pain is all over her body there is no one place that is significantly more painful. Her pain is a 10/10, she states that at home her pain is usually 5-6/10. She did not sleep well over night.       REVIEW OF 10 BODY SYSTEMS: 10 point ROS of systems including Constitutional, Eyes, Respiratory, Cardiovascular, Gastroenterology, Genitourinary, Integumentary, Musculoskeletal, Psychiatric were all negative except for pertinent positives noted in my HPI.     INPATIENT MEDICATIONS PERTINENT TO PAIN CONSULT:   Medications related to Pain Management (From now, onward)    Start     Dose/Rate Route Frequency Ordered Stop    11/12/21 1900  ketamine (KETALAR) 2 mg/mL in sodium chloride 0.9 % 50 mL ANALGESIA infusion         4 mg/hr  2 mL/hr  Intravenous CONTINUOUS 11/12/21 1755 11/11/21 1742  oxyCODONE IR (ROXICODONE) tablet 20 mg         20 mg Oral EVERY 4 HOURS PRN 11/11/21 1743      11/11/21 0103  lidocaine 1 % 0.1-1 mL         0.1-1 mL Other EVERY 1 HOUR PRN 11/11/21 0103      11/11/21 0103  lidocaine (LMX4) cream          Topical EVERY 1 HOUR PRN 11/11/21 0103      11/11/21 0103  acetaminophen (TYLENOL) tablet 975 mg         975 mg Oral EVERY 8 HOURS 11/11/21 0103 11/11/21 0103  senna-docusate (SENOKOT-S/PERICOLACE) 8.6-50 MG per tablet 1 tablet        \"Or\" Linked Group Details    1 tablet Oral 2 TIMES DAILY PRN 11/11/21 0103 11/11/21 0103  senna-docusate (SENOKOT-S/PERICOLACE) 8.6-50 MG per tablet 2 tablet        \"Or\" Linked Group Details    2 tablet Oral 2 TIMES DAILY PRN 11/11/21 0103      11/10/21 2220  DULoxetine (CYMBALTA) DR capsule 30 mg         30 mg Oral 2 TIMES DAILY 11/10/21 2218      11/10/21 2220  morphine (MS CONTIN) 12 hr tablet 15 mg         15 mg Oral EVERY 12 HOURS 11/10/21 2218      11/10/21 2214  hydrOXYzine (ATARAX) tablet 25 mg         25 mg Oral 3 TIMES DAILY PRN 11/10/21 2218      11/10/21 2213  diphenhydrAMINE (BENADRYL) capsule 25-50 mg         25-50 mg " Oral AT BEDTIME PRN 11/10/21 2218      11/10/21 2213  acetaminophen (TYLENOL) tablet 650 mg         650 mg Oral EVERY 6 HOURS PRN 11/10/21 2218            CURRENT MEDICATIONS:   Current Facility-Administered Medications Ordered in Epic   Medication Dose Route Frequency Provider Last Rate Last Admin     acetaminophen (TYLENOL) tablet 650 mg  650 mg Oral Q6H PRN Lance Camarena MD   650 mg at 11/13/21 0457     acetaminophen (TYLENOL) tablet 975 mg  975 mg Oral Q8H Lance Camarena MD   975 mg at 11/12/21 1408     albuterol (PROAIR HFA/PROVENTIL HFA/VENTOLIN HFA) 108 (90 Base) MCG/ACT inhaler 2 puff  2 puff Inhalation Q6H PRN Lance Camarena MD         albuterol (PROVENTIL) neb solution 2.5 mg  2.5 mg Nebulization Q6H PRN Lance Camarena MD   2.5 mg at 11/12/21 1356     deferasirox (JADENU) tablet 1,440 mg  1,440 mg Oral QPM Lance Camarena MD   1,440 mg at 11/12/21 2123     diphenhydrAMINE (BENADRYL) capsule 25-50 mg  25-50 mg Oral At Bedtime PRN Lance Camarena MD         DULoxetine (CYMBALTA) DR capsule 30 mg  30 mg Oral BID Lance Camarena MD   30 mg at 11/13/21 0855     fluticasone-vilanterol (BREO ELLIPTA) 200-25 MCG/INH inhaler 1 puff  1 puff Inhalation Daily Reta Miramontes MD   1 puff at 11/11/21 0819     heparin infusion 25,000 units in D5W 250 mL ANTICOAGULANT  0-5,000 Units/hr Intravenous Continuous Reta Miramontes MD 8 mL/hr at 11/13/21 0759 800 Units/hr at 11/13/21 0759     hydroxyurea (HYDREA) capsule 2,000 mg  2,000 mg Oral Daily Reta Miramontes MD   2,000 mg at 11/13/21 0855     hydrOXYzine (ATARAX) tablet 25 mg  25 mg Oral TID PRN Lance Camarena MD         ketamine (KETALAR) 2 mg/mL in sodium chloride 0.9 % 50 mL ANALGESIA infusion  4 mg/hr Intravenous Continuous Jac Caceres MD 2 mL/hr at 11/13/21 0848 4 mg/hr at 11/13/21 0848     lidocaine (LMX4) cream   Topical Q1H PRN Lance Camarena MD         lidocaine 1 % 0.1-1 mL  0.1-1 mL Other Q1H PRN Lance Camarena MD         melatonin tablet 3 mg  3 mg Oral  At Bedtime PRN Lance Camarena MD         morphine (MS CONTIN) 12 hr tablet 15 mg  15 mg Oral Q12H Lance Camarena MD   15 mg at 11/13/21 1015     naloxone (NARCAN) injection 0.2 mg  0.2 mg Intravenous Q2 Min PRN Reta Miramontes MD        Or     naloxone (NARCAN) injection 0.4 mg  0.4 mg Intravenous Q2 Min PRN Reta Miramontes MD        Or     naloxone (NARCAN) injection 0.2 mg  0.2 mg Intramuscular Q2 Min PRN Reta Miramontes MD        Or     naloxone (NARCAN) injection 0.4 mg  0.4 mg Intramuscular Q2 Min PRN eRta Miramontes MD         ondansetron (ZOFRAN) tablet 8 mg  8 mg Oral Q8H PRN Lance Camarena MD         oxyCODONE IR (ROXICODONE) tablet 20 mg  20 mg Oral Q4H PRN Terese Arroyo MD   20 mg at 11/13/21 0457     pantoprazole (PROTONIX) EC tablet 40 mg  40 mg Oral QAM AC Reta Miramontes MD   40 mg at 11/13/21 0854     Patient is already receiving anticoagulation with heparin, enoxaparin (LOVENOX), warfarin (COUMADIN)  or other anticoagulant medication   Does not apply Continuous PRN Lance Camarena MD         senna-docusate (SENOKOT-S/PERICOLACE) 8.6-50 MG per tablet 1 tablet  1 tablet Oral BID PRN Lance Camarena MD        Or     senna-docusate (SENOKOT-S/PERICOLACE) 8.6-50 MG per tablet 2 tablet  2 tablet Oral BID PRN Lance Camarena MD         sodium chloride (PF) 0.9% PF flush 3 mL  3 mL Intracatheter Q8H Lance Camarena MD   3 mL at 11/13/21 0257     sodium chloride (PF) 0.9% PF flush 3 mL  3 mL Intracatheter q1 min prn Lance Camarena MD   20 mL at 11/13/21 0638     Warfarin Therapy Reminder (Check START DATE - warfarin may be starting in the FUTURE)  1 each Does not apply Continuous PRN Terese Arroyo MD         No current [facility]-ordered outpatient medications on file.       HOME/PREVIOUS MEDICATIONS:   Prior to Admission medications    Medication Sig Start Date End Date Taking? Authorizing Provider   acetaminophen (TYLENOL) 325 MG tablet Take 2 tablets (650 mg) by mouth every 6 hours as needed for mild pain 9/20/21   Yes Eric Duncan MD   albuterol (PROAIR HFA/PROVENTIL HFA/VENTOLIN HFA) 108 (90 Base) MCG/ACT inhaler Inhale 2 puffs into the lungs every 6 hours as needed for shortness of breath / dyspnea or wheezing 9/20/21  Yes Eric Duncan MD   ARIPiprazole (ABILIFY) 2 MG tablet Take 1 tablet (2 mg) by mouth daily 9/20/21  Yes Eric Duncan MD   aspirin (ASA) 81 MG chewable tablet Take 1 tablet (81 mg) by mouth daily 11/1/21  Yes Andrei Machado PA   budesonide-formoterol (SYMBICORT) 160-4.5 MCG/ACT Inhaler Inhale 2 puffs into the lungs 2 times daily 9/20/21  Yes Eric Duncan MD   dabigatran ANTICOAGULANT (PRADAXA) 150 MG capsule Take 1 capsule (150 mg) by mouth 2 times daily Store in original 's bottle or blister pack; use within 120 days of opening. 11/1/21  Yes Andrei Machado PA   diphenhydrAMINE (BENADRYL) 25 MG capsule Take 1-2 capsules (25-50 mg) by mouth nightly as needed for sleep 9/20/21  Yes Eric Duncan MD   DULoxetine (CYMBALTA) 30 MG capsule Take 1 capsule (30 mg) by mouth 2 times daily 11/1/21  Yes Andrei Machado PA   EPINEPHrine (ANY BX GENERIC EQUIV) 0.3 MG/0.3ML injection 2-pack Inject 0.3 mLs (0.3 mg) into the muscle as needed for anaphylaxis 2/24/21  Yes Andrei Machado PA   Hydroxyurea 1000 MG TABS Take 2,000 mg by mouth daily 9/20/21  Yes Eric Duncan MD   hydrOXYzine (ATARAX) 25 MG tablet Take 1 tablet (25 mg) by mouth 3 times daily as needed for anxiety 5/19/21  Yes Andrei Machado PA   JADENU 360 MG tablet Take 4 tablets (1,440 mg) by mouth every evening 9/20/21  Yes Eric Duncan MD   morphine (MS CONTIN) 15 MG CR tablet Take 1 tablet (15 mg) by mouth every 12 hours Take 1/2 tablet every 12 hours initially 10/18/21  Yes Eric Duncan MD   naloxone (NARCAN) 4 MG/0.1ML nasal spray Spray 1 spray (4 mg) into one nostril alternating nostrils once as needed for opioid reversal every 2-3 minutes until  assistance arrives 4/27/20  Yes Eric Duncan MD   omeprazole (PRILOSEC) 20 MG DR capsule Take 1 capsule (20 mg) by mouth daily 9/20/21  Yes Eric Duncan MD   ondansetron (ZOFRAN) 8 MG tablet Take 1 tablet (8 mg) by mouth every 8 hours as needed 9/20/21  Yes Eric Duncan MD   oxyCODONE IR (ROXICODONE) 15 MG tablet Take 1 tablet (15mg) by mouth every 4-6 hours as needed for severe pain. Goal 4 per day. Max 6 per day. 11/10/21  Yes Andrei Machado PA   albuterol (PROVENTIL) (2.5 MG/3ML) 0.083% neb solution Take 1 vial (2.5 mg) by nebulization every 6 hours as needed for shortness of breath / dyspnea or wheezing  Patient not taking: Reported on 11/10/2021 9/20/21   Eric Duncan MD   lidocaine-prilocaine (EMLA) 2.5-2.5 % external cream Apply topically once for 1 dose Use for port site as needed  Patient not taking: Reported on 11/10/2021 9/20/21 10/18/21  Eric Duncan MD   medroxyPROGESTERone (DEPO-PROVERA) 150 MG/ML IM injection Inject 150 mg into the muscle 5/14/20 10/18/21  Reported, Patient       ALLERGIES:    Allergies   Allergen Reactions     Contrast Dye      Hives and breathing issues     Fish-Derived Products Hives     Seafood Hives     Diagnostic X-Ray Materials      Gadolinium         PAST MEDICAL AND PSYCHIATRIC HISTORY:    Past Medical History:   Diagnosis Date     Anxiety      Bleeding disorder (H)      Blood clotting disorder (H)      Cerebral infarction (H) 2015     Cognitive developmental delay     low IQ. 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 (H)     right hand contractures     Iron overload due to repeated red blood cell transfusions      Migraines      Multiple subsegmental pulmonary emboli without acute cor pulmonale (H) 02/01/2021     Oppositional defiant behavior      Superficial venous thrombosis of arm, right  03/25/2021     Uncomplicated asthma        PAST SURGICAL HISTORY:   Past Surgical History:   Procedure Laterality Date     AS INSERT TUNNELED CV 2 CATH W/O PORT/PUMP       C BREAST REDUCTION (INCLUDES LIPO) TIER 3 Bilateral 04/23/2019     CHOLECYSTECTOMY       INSERT PORT VASCULAR ACCESS Left 4/21/2021    Procedure: INSERTION, VASCULAR ACCESS PORT (NOT SURE ON SIDE UNTIL REMOVAL);  Surgeon: Rajan More MD;  Location: [facility] OR     IR CHEST PORT PLACEMENT > 5 YRS OF AGE  4/21/2021     IR CVC NON TUNNEL LINE REMOVAL  5/6/2021     IR CVC NON TUNNEL PLACEMENT  04/07/2020     IR CVC NON TUNNEL PLACEMENT  4/30/2021     IR PORT REMOVAL LEFT  4/21/2021     REMOVE PORT VASCULAR ACCESS Left 4/21/2021    Procedure: REMOVAL, VASCULAR ACCESS PORT LEFT;  Surgeon: Rajan More MD;  Location: [facility] OR     REPAIR TENDON ELBOW Right 10/02/2019    Procedure: Right Elbow Flexor Lengthening, Flexor Pronator Slide Of Wrist and Finger, Thumb Adductor Lengthening;  Surgeon: Anai Franco MD;  Location: [facility] OR     TONSILLECTOMY Bilateral 10/02/2019    Procedure: Bilateral Tonsillectomy;  Surgeon: Farhana Guy MD;  Location: [facility] OR       FAMILY HISTORY: family history includes Asthma in her mother; Hypertension in her mother; Sickle Cell Trait in her father and mother.    HEALTH & LIFESTYLE PRACTICES:   Tobacco:  reports that she has never smoked. She has never used smokeless tobacco.  Alcohol:  reports previous alcohol use.  Illicit drugs:  reports no history of drug use.    LABORATORY VALUES:   Last Basic Metabolic Panel:  Lab Results   Component Value Date     11/13/2021     07/10/2021      Lab Results   Component Value Date    POTASSIUM 4.6 11/13/2021    POTASSIUM 3.7 11/11/2021    POTASSIUM 3.9 07/10/2021     Lab Results   Component Value Date    CHLORIDE 110 11/13/2021    CHLORIDE 112 07/10/2021     Lab Results   Component Value Date    MICAH 8.5 11/13/2021    MICAH 8.4 07/10/2021     Lab Results  "  Component Value Date    CO2 23 11/13/2021    CO2 22 07/10/2021     Lab Results   Component Value Date    BUN 10 11/13/2021    BUN 9 07/10/2021     Lab Results   Component Value Date    CR 0.52 11/13/2021    CR 0.50 07/10/2021     Lab Results   Component Value Date     11/13/2021     07/10/2021       CBC results:  Lab Results   Component Value Date    WBC 12.1 11/13/2021    WBC 15.8 07/10/2021     Lab Results   Component Value Date    HGB 6.9 11/13/2021    HGB 7.8 07/10/2021     Lab Results   Component Value Date    HCT 19.0 11/13/2021    HCT 22.2 07/10/2021     Lab Results   Component Value Date     11/13/2021     07/10/2021       DIAGNOSTIC TESTS:   Labs above reviewed as well as additional relevant diagnostic studies from the EPIC record.     PHYSICAL EXAMINATION:  BP (!) 146/63 (BP Location: Left arm)   Pulse 111   Temp 37.3  C (99.2  F) (Oral)   Resp 17   Ht 1.626 m (5' 4\")   Wt 76.9 kg (169 lb 8.5 oz)   SpO2 92%   BMI 29.10 kg/m       EXAM:  General: resting comfortably in bed  Neuro: A&Ox3, NAD  Resp: Breathing non-labored  CV: RRR    Tati García MD  November 13, 2021, 11:02 AM  Inpatient Pain Management Service            "

## 2021-11-13 NOTE — PLAN OF CARE
"[id number]: VSS, Aox4, no c/o of nausea or sob this shift. Pt rates pain @ \"9/10\", started on Ketamine drip @ 4mg/hr w/mild relief, q2h IS education given. Continues on heparin gtt @ 900 unit(s)/hr, anti xa recheck In AM. On 3L NC, satting in upper 90's. 1x warfarin dose given. Up SBA w/R arm weakness. Continue to encourage oral intake and monitor pain.  "

## 2021-11-13 NOTE — PROGRESS NOTES
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 Hematology recommends coumadin with INR goal 2-3 and bridging with heparin and have ordered APS workup. Pain meds being adjusted for diffuse body pain. No evidence of acute chest. Abn CXR and drop in hgb 11/13 d/w hematology.    Today:  - Ketamine drip started for pain control 11/12. Increase up to 6mg/hr if needed for pain control  - If inadequate pain control on 6mg/hr ketamine change PRN oxycodone to oral dilaudid 4 mg q 3 hrs PRN  - Rocephin and zithromax added for infiltrate on CXR  - Monitor hgb, no transfusion today  - Monitor WBC count  - check blood cultures/UA/UC  - Anticardiolipin antibodies pending      #Acute hypoxic respiratory failure  #Recurrent Pulm Embolism  Diagnosed with  PE On 2/1/21 started on rivaroxaban  Switched to apixaban on  3/25/21 with RUE DVT. Developed worsening of PE requiring admission from 4/26/21-5/11/21  in setting of low Apixaban levels. Admitted again From  7/13/21-7/25/21 for acute on chronic PE, switched to dabigitran + ASA.  Claims that she is taking the medicines properly at this time.  Presented with worsening of SOB  of 1 month.  No chest pain. NM perfusion scan on 11/10/21 showed  at least 3 new perfusion defects In the background of chronic  perfusion defects which are suspicion for acute on chronic PE. These developed in the setting of dabiagtan and ASA which is concerning for anticoagulation failure. Appreciate hematology input. Start coumadin and bridge with high intensity heparin. APS workup ordered by hematology.     #Left arm pain, diffuse joint pain all over body, no evidence of acute chest syndrome, Acute pain crisis with known h/o Sickle Cell Disease:   Hgb at baseline, CXR w infiltrates  Plan:      Continue PTA MS contin 15mg CR BID    Switch from Oxycodone 20mg PO  Q4 hours PRN to dilaudid oral 4-6 mg q3h PRN if no relief with ketamine 6 mg/hr    No PCA for now    No IV narcotics    Zofran PRN    Senna    Trend hemoglobin, LDH, reticulocyte count     #sickle cell anemia  #Iron overload 2/2blood transfusion    Continue PTA Hydrea 2000mg/daily    Continue PTA [id number]/day     #Bronchial asthma    Continue PTA  albuterol    Continue PTA Symbicort     #Depression,    Continue PTA duloxetine 30mg/day and abilify     #Hx of CVA              Diet: Combination Diet Regular Diet Adult    DVT Prophylaxis: Warfarin  Lopez Catheter: Not present  Central Lines: None  Code Status: Full Code      Disposition Plan   Expected discharge: 11/15/2021   recommended to prior living arrangement once adequate pain control and off oxygen.     The patient's care was discussed with the Bedside Nurse, Patient and pain and hematology Consultant.    Terese Arroyo MD  Hospitalist Service, [address]  Securely message with the Vocera Web Console (learn more here)  Text page via [employer] Paging/Directory    Please see sign in/sign out for up to date coverage information    Clinically Significant Risk Factors Present on Admission          }     ______________________________________________________________________    Interval History   Patient resting comfortably during visit. Asks for pain meds but agrees to plan of  increasing ketamine drip rate. ROS neg    Data reviewed today: I reviewed all medications, new labs and imaging results over the last 24 hours.     Physical Exam   Vital Signs: Temp: 99.2  F (37.3  C) Temp src: Oral BP: (!) 146/63 Pulse: 111   Resp: 17 SpO2: 92 % O2 Device: Nasal cannula Oxygen Delivery: 3 LPM  Weight: 169 lbs 8.54 oz  General Appearance: Well built and nourished, not in any acute cardio pulm distress  Respiratory: CTA b/l  Cardiovascular: S1S2 normal RRR  GI: soft NT  Skin: NAD  Other: aaox3 moving all 4 ext spont     Data   Recent Labs   Lab 11/13/21  0524 11/12/21  0607 11/11/21  1644 11/11/21  0955 11/11/21  0112 11/10/21  1943 11/10/21  1109 11/10/21  1109   WBC 12.1* 10.8  --   --   --  13.9*  --  13.4*   HGB 6.9* 7.7*  --   --   --  7.7*  --  8.0*   MCV 91 92  --   --   --  91  --  89    316  --   --   --  281  --  286   INR 1.29* 1.29* 1.24*  --   --  1.23*   < >  --     139  --   --   --  136  --  138   POTASSIUM 4.6 3.6  --  3.7  3.7   < > 3.3*  --  3.8   CHLORIDE 110* 110*  --   --   --  110*  --  112*   CO2 23 23  --   --   --  19*  --  19*   BUN 10 6*  --   --   --  11  --  12   CR 0.52 0.62  --   --   --  0.49*  --  0.49*   ANIONGAP 6 6  --   --   --  7  --  7   MICAH 8.5 8.4*  --   --   --  8.5  --  8.3*   * 84  --   --   --  95  --  113*   ALBUMIN  --   --   --   --   --  3.8  --  3.9   PROTTOTAL  --   --   --   --   --  7.6  --  7.8   BILITOTAL  --   --   --   --   --  3.5*  --  3.8*   ALKPHOS  --   --   --   --   --  79  --  84   ALT  --   --   --   --   --  85*  --  94*   AST  --   --   --   --   --  112*  --   --    TROPONIN  --   --   --   --   --  <0.015  --  <0.015    < > = values in this interval not displayed.     No results found for this or any previous visit (from the past 24 hour(s)).  Medications     heparin 700 Units/hr (11/13/21 1631)     ketamine 2 mg/mL ADULT 5 mg/hr (11/13/21 1020)     - MEDICATION INSTRUCTIONS -       Warfarin Therapy Reminder          acetaminophen  975 mg Oral Q8H     ARIPiprazole  2 mg Oral Daily     azithromycin  500 mg Intravenous Q24H     cefTRIAXone  1 g Intravenous Q24H     deferasirox  1,440 mg Oral QPM     DULoxetine  30 mg Oral BID     fluticasone-vilanterol  1 puff Inhalation Daily     hydroxyurea  2,000 mg Oral Daily     morphine  15 mg Oral Q12H     pantoprazole  40 mg Oral QAM AC     sodium chloride (PF)  3 mL Intracatheter Q8H

## 2021-11-14 LAB
ANION GAP SERPL CALCULATED.3IONS-SCNC: 4 MMOL/L (ref 3–14)
BUN SERPL-MCNC: 9 MG/DL (ref 7–30)
CALCIUM SERPL-MCNC: 8.8 MG/DL (ref 8.5–10.1)
CHLORIDE BLD-SCNC: 110 MMOL/L (ref 94–109)
CO2 SERPL-SCNC: 24 MMOL/L (ref 20–32)
CREAT SERPL-MCNC: 0.54 MG/DL (ref 0.52–1.04)
ERYTHROCYTE [DISTWIDTH] IN BLOOD BY AUTOMATED COUNT: 22.5 % (ref 10–15)
GFR SERPL CREATININE-BSD FRML MDRD: >90 ML/MIN/1.73M2
GLUCOSE BLD-MCNC: 111 MG/DL (ref 70–99)
HCT VFR BLD AUTO: 17.6 % (ref 35–47)
HGB BLD-MCNC: 6.3 G/DL (ref 11.7–15.7)
HGB BLD-MCNC: 6.6 G/DL (ref 11.7–15.7)
INR PPP: 1.64 (ref 0.85–1.15)
LACTATE SERPL-SCNC: 0.7 MMOL/L (ref 0.7–2)
MCH RBC QN AUTO: 33.2 PG (ref 26.5–33)
MCHC RBC AUTO-ENTMCNC: 37.5 G/DL (ref 31.5–36.5)
MCV RBC AUTO: 88 FL (ref 78–100)
PLATELET # BLD AUTO: 238 10E3/UL (ref 150–450)
POTASSIUM BLD-SCNC: 4 MMOL/L (ref 3.4–5.3)
RBC # BLD AUTO: 1.99 10E6/UL (ref 3.8–5.2)
SODIUM SERPL-SCNC: 138 MMOL/L (ref 133–144)
UFH PPP CHRO-ACNC: 0.26 IU/ML
UFH PPP CHRO-ACNC: 0.27 IU/ML
UFH PPP CHRO-ACNC: 0.54 IU/ML
WBC # BLD AUTO: 10.9 10E3/UL (ref 4–11)

## 2021-11-14 PROCEDURE — 99232 SBSQ HOSP IP/OBS MODERATE 35: CPT | Performed by: ANESTHESIOLOGY

## 2021-11-14 PROCEDURE — 250N000013 HC RX MED GY IP 250 OP 250 PS 637

## 2021-11-14 PROCEDURE — 258N000003 HC RX IP 258 OP 636: Performed by: INTERNAL MEDICINE

## 2021-11-14 PROCEDURE — 36591 DRAW BLOOD OFF VENOUS DEVICE: CPT | Performed by: INTERNAL MEDICINE

## 2021-11-14 PROCEDURE — 250N000009 HC RX 250: Performed by: INTERNAL MEDICINE

## 2021-11-14 PROCEDURE — 85520 HEPARIN ASSAY: CPT | Performed by: INTERNAL MEDICINE

## 2021-11-14 PROCEDURE — 36415 COLL VENOUS BLD VENIPUNCTURE: CPT | Performed by: INTERNAL MEDICINE

## 2021-11-14 PROCEDURE — 85018 HEMOGLOBIN: CPT | Performed by: INTERNAL MEDICINE

## 2021-11-14 PROCEDURE — 120N000002 HC R&B MED SURG/OB UMMC

## 2021-11-14 PROCEDURE — 80048 BASIC METABOLIC PNL TOTAL CA: CPT | Performed by: INTERNAL MEDICINE

## 2021-11-14 PROCEDURE — 250N000011 HC RX IP 250 OP 636: Performed by: EMERGENCY MEDICINE

## 2021-11-14 PROCEDURE — 250N000013 HC RX MED GY IP 250 OP 250 PS 637: Performed by: INTERNAL MEDICINE

## 2021-11-14 PROCEDURE — 85610 PROTHROMBIN TIME: CPT | Performed by: INTERNAL MEDICINE

## 2021-11-14 PROCEDURE — 87040 BLOOD CULTURE FOR BACTERIA: CPT | Performed by: INTERNAL MEDICINE

## 2021-11-14 PROCEDURE — 250N000013 HC RX MED GY IP 250 OP 250 PS 637: Performed by: EMERGENCY MEDICINE

## 2021-11-14 PROCEDURE — 250N000011 HC RX IP 250 OP 636: Performed by: INTERNAL MEDICINE

## 2021-11-14 PROCEDURE — 99231 SBSQ HOSP IP/OBS SF/LOW 25: CPT | Performed by: INTERNAL MEDICINE

## 2021-11-14 PROCEDURE — 999N000128 HC STATISTIC PERIPHERAL IV START W/O US GUIDANCE

## 2021-11-14 PROCEDURE — 258N000003 HC RX IP 258 OP 636: Performed by: EMERGENCY MEDICINE

## 2021-11-14 PROCEDURE — 99233 SBSQ HOSP IP/OBS HIGH 50: CPT | Performed by: INTERNAL MEDICINE

## 2021-11-14 PROCEDURE — 83605 ASSAY OF LACTIC ACID: CPT | Performed by: INTERNAL MEDICINE

## 2021-11-14 RX ORDER — WARFARIN SODIUM 5 MG/1
5 TABLET ORAL
Status: COMPLETED | OUTPATIENT
Start: 2021-11-14 | End: 2021-11-14

## 2021-11-14 RX ORDER — HYDROMORPHONE HYDROCHLORIDE 4 MG/1
4 TABLET ORAL
Status: DISCONTINUED | OUTPATIENT
Start: 2021-11-14 | End: 2021-11-14

## 2021-11-14 RX ORDER — LIDOCAINE HYDROCHLORIDE ANHYDROUS AND DEXTROSE MONOHYDRATE .8; 5 G/100ML; G/100ML
1 INJECTION, SOLUTION INTRAVENOUS CONTINUOUS
Status: DISCONTINUED | OUTPATIENT
Start: 2021-11-14 | End: 2021-11-16

## 2021-11-14 RX ORDER — SODIUM CHLORIDE 9 MG/ML
INJECTION, SOLUTION INTRAVENOUS CONTINUOUS
Status: DISCONTINUED | OUTPATIENT
Start: 2021-11-14 | End: 2021-11-17

## 2021-11-14 RX ORDER — LIDOCAINE 40 MG/G
CREAM TOPICAL
Status: DISCONTINUED | OUTPATIENT
Start: 2021-11-14 | End: 2021-11-14

## 2021-11-14 RX ADMIN — MORPHINE SULFATE 15 MG: 15 TABLET, EXTENDED RELEASE ORAL at 22:27

## 2021-11-14 RX ADMIN — HEPARIN SODIUM 1150 UNITS/HR: 1000 INJECTION INTRAVENOUS; SUBCUTANEOUS at 22:31

## 2021-11-14 RX ADMIN — DULOXETINE 30 MG: 30 CAPSULE, DELAYED RELEASE ORAL at 09:11

## 2021-11-14 RX ADMIN — ACETAMINOPHEN 975 MG: 325 TABLET, FILM COATED ORAL at 02:36

## 2021-11-14 RX ADMIN — ACETAMINOPHEN 975 MG: 325 TABLET, FILM COATED ORAL at 18:13

## 2021-11-14 RX ADMIN — HYDROMORPHONE HYDROCHLORIDE 6 MG: 4 TABLET ORAL at 22:35

## 2021-11-14 RX ADMIN — ARIPIPRAZOLE 2 MG: 2 TABLET ORAL at 09:11

## 2021-11-14 RX ADMIN — HYDROMORPHONE HYDROCHLORIDE 6 MG: 4 TABLET ORAL at 16:24

## 2021-11-14 RX ADMIN — AZITHROMYCIN MONOHYDRATE 500 MG: 500 INJECTION, POWDER, LYOPHILIZED, FOR SOLUTION INTRAVENOUS at 13:48

## 2021-11-14 RX ADMIN — HYDROXYUREA 2000 MG: 500 CAPSULE ORAL at 09:11

## 2021-11-14 RX ADMIN — HYDROMORPHONE HYDROCHLORIDE 6 MG: 4 TABLET ORAL at 11:09

## 2021-11-14 RX ADMIN — DULOXETINE 30 MG: 30 CAPSULE, DELAYED RELEASE ORAL at 19:09

## 2021-11-14 RX ADMIN — WARFARIN SODIUM 5 MG: 5 TABLET ORAL at 18:13

## 2021-11-14 RX ADMIN — SODIUM CHLORIDE: 9 INJECTION, SOLUTION INTRAVENOUS at 00:35

## 2021-11-14 RX ADMIN — HYDROMORPHONE HYDROCHLORIDE 4 MG: 4 TABLET ORAL at 02:36

## 2021-11-14 RX ADMIN — KETAMINE HYDROCHLORIDE 5 MG/HR: 100 INJECTION, SOLUTION, CONCENTRATE INTRAMUSCULAR; INTRAVENOUS at 00:31

## 2021-11-14 RX ADMIN — SODIUM CHLORIDE: 9 INJECTION, SOLUTION INTRAVENOUS at 11:32

## 2021-11-14 RX ADMIN — CEFTRIAXONE 1 G: 1 INJECTION, POWDER, FOR SOLUTION INTRAMUSCULAR; INTRAVENOUS at 11:31

## 2021-11-14 RX ADMIN — DEFERASIROX 1440 MG: 360 TABLET ORAL at 19:09

## 2021-11-14 RX ADMIN — HEPARIN SODIUM 1000 UNITS/HR: 1000 INJECTION INTRAVENOUS; SUBCUTANEOUS at 15:46

## 2021-11-14 RX ADMIN — PANTOPRAZOLE SODIUM 40 MG: 40 TABLET, DELAYED RELEASE ORAL at 09:11

## 2021-11-14 RX ADMIN — MORPHINE SULFATE 15 MG: 15 TABLET, EXTENDED RELEASE ORAL at 11:12

## 2021-11-14 ASSESSMENT — ACTIVITIES OF DAILY LIVING (ADL)
ADLS_ACUITY_SCORE: 6
ADLS_ACUITY_SCORE: 12
ADLS_ACUITY_SCORE: 12
ADLS_ACUITY_SCORE: 6
ADLS_ACUITY_SCORE: 6
ADLS_ACUITY_SCORE: 12
ADLS_ACUITY_SCORE: 6
ADLS_ACUITY_SCORE: 12
ADLS_ACUITY_SCORE: 6
ADLS_ACUITY_SCORE: 12
ADLS_ACUITY_SCORE: 6
ADLS_ACUITY_SCORE: 12
ADLS_ACUITY_SCORE: 6

## 2021-11-14 ASSESSMENT — MIFFLIN-ST. JEOR: SCORE: 1534

## 2021-11-14 NOTE — PLAN OF CARE
"BP (!) 149/78 (BP Location: Left leg, Cuff Size: Adult Regular)   Pulse 111   Temp 98.6  F (37  C) (Oral)   Resp 18   Ht 1.626 m (5' 4\")   Wt 78.9 kg (173 lb 15.1 oz)   SpO2 93%   BMI 29.86 kg/m       AVSS ,tachy. Dyspnea on exertion, 89% on RA. On 2 L NC. Pt reports generalized pain of 10/10. Refused Tylenol. Pt denies N/V. Poor oral intake. Heparin drip at 850 units/hr; Xa- 0.54 @ 0625; recheck at 1315 with 0.26 level. Bolus of 2350 units, ordered, with a continuous of an increase of 150 unit(s) to run after that (total 1000 unit(s) at 1500. Heparin refill just ordered. Port running with continuous Hep gtts., R PIV with ketamine at 6 mg @ 3 mL/hr Y-d in with NS at 50 ml/hr. Options have been explored all shift to get an extra lines for infusion. PICC not recommended due to bleeding risk, PE's as per MD. Pain team anticipates d/cing Ketamine drip by 11/15. New order for Lidocaine analgesia in D5W continous on hold for that reason. Last BM 11/13/21 at night per pt.report. Continue with POC.         "

## 2021-11-14 NOTE — PROGRESS NOTES
Brief Hematology Staff Note:  Jennifer reports no further improvement in pain control today and wishes to stop her Ketamine infusion.  No chest pain or shortness of breath today.    1. Continue heparin bridge to warfarin INR 2-3  2. Okay to change pain control per patient's desires whether that be to go back to dilaudid or try lidocaine infusion suggested by pain team.  3.  No indication for transfusion today    20 minutes seeing the patient and coordinating care.    Carlos Eduardo Villagran MD   of Medicine, Division of Hematology, Oncology and Transplantation  [facility]

## 2021-11-14 NOTE — PROGRESS NOTES
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 Hematology recommends coumadin with INR goal 2-3 and bridging with heparin and have ordered APS workup. Pain meds being adjusted for diffuse body pain. No evidence of acute chest. Abn CXR and drop in hgb 11/13 d/w hematology.    Today:  - Ketamine drip started for pain control 11/12. Increase up to 6mg/hr if needed for pain control. Lidocaine infusion added today. PRN oxycodone changed to oral dilaudid 6 mg q 3 hrs PRN  - Continue Rocephin and zithromax for infiltrate on CXR  - Monitor hgb, no transfusion today  - Monitor WBC count  - follow blood cultures/UA/UC  - Anticardiolipin antibodies pending      #Acute hypoxic respiratory failure  #Recurrent Pulm Embolism  Diagnosed with  PE On 2/1/21 started on rivaroxaban  Switched to apixaban on  3/25/21 with RUE DVT. Developed worsening of PE requiring admission from 4/26/21-5/11/21  in setting of low Apixaban levels. Admitted again From  7/13/21-7/25/21 for acute on chronic PE, switched to dabigitran + ASA.  Claims that she is taking the medicines properly at this time.  Presented with worsening of SOB  of 1 month.  No chest pain. NM perfusion scan on 11/10/21 showed  at least 3 new perfusion defects In the background of chronic  perfusion defects which are suspicion for acute on chronic PE. These developed in the setting of dabiagtan and ASA which is concerning for anticoagulation failure. Appreciate hematology input. Start coumadin and bridge with high intensity heparin. APS workup ordered by hematology.     #Left arm pain, diffuse joint pain all over body, no evidence of acute chest syndrome, Acute pain crisis with known h/o Sickle Cell Disease:   Hgb at baseline, CXR w infiltrates  Plan:      Continue PTA MS contin 15mg CR BID    Switch from Oxycodone 20mg PO  Q4 hours PRN to dilaudid oral 4-6 mg q3h PRN if no relief with ketamine 6 mg/hr    No PCA for now    No IV narcotics    Zofran PRN    Senna    Trend hemoglobin, LDH, reticulocyte count     #sickle cell anemia  #Iron overload 2/2blood transfusion    Continue PTA Hydrea 2000mg/daily    Continue PTA [id number]     #Bronchial asthma    Continue PTA  albuterol    Continue PTA Symbicort     #Depression,    Continue PTA duloxetine 30mg/day and abilify     #Hx of CVA                Diet: Combination Diet Regular Diet Adult    DVT Prophylaxis: heparin drip  Lopez Catheter: Not present  Central Lines: None  Code Status: Full Code      Disposition Plan   Expected discharge: 11/15/2021   recommended to prior living arrangement once adequate pain management/ tolerating PO medications.     The patient's care was discussed with the Patient and pain team Consultant.    Terese Arroyo MD  Hospitalist Service, [address]  Securely message with the Vocera Web Console (learn more here)  Text page via [employer] Paging/Directory    Please see sign in/sign out for up to date coverage information    Clinically Significant Risk Factors Present on Admission               ______________________________________________________________________    Interval History   Patient resting comfortably. ROS neg    Data reviewed today: I reviewed all medications, new  labs and imaging results over the last 24 hours.     Physical Exam   Vital Signs: Temp: 98.6  F (37  C) Temp src: Oral BP: (!) 149/78 Pulse: 111   Resp: 18 SpO2: 93 % O2 Device: Nasal cannula Oxygen Delivery: 3 LPM  Weight: 173 lbs 15.09 oz  General Appearance: Well built and nourished, not in any acute cardio pulm distress  Respiratory: CTA B/L  Cardiovascular: S1S2 normal RRR  GI: soft NT  Skin: NAD  Other: Moving all 4 ext spont, residual right hemiparesis and flexion contracture TIFFANY, aaox3     Data   Recent Labs   Lab 11/14/21  0625 11/14/21  0127 11/13/21  0524 11/12/21  0607 11/11/21  0112 11/10/21  1943 11/10/21  1109   WBC 10.9  --  12.1* 10.8  --  13.9* 13.4*   HGB 6.6* 6.3* 6.9* 7.7*  --  7.7* 8.0*   MCV 88  --  91 92  --  91 89     --  270 316  --  281 286   INR 1.64*  --  1.29* 1.29*   < > 1.23*  --      --  139 139  --  136 138   POTASSIUM 4.0  --  4.6 3.6   < > 3.3* 3.8   CHLORIDE 110*  --  110* 110*  --  110* 112*   CO2 24  --  23 23  --  19* 19*   BUN 9  --  10 6*  --  11 12   CR 0.54  --  0.52 0.62  --  0.49* 0.49*   ANIONGAP 4  --  6 6  --  7 7   MICAH 8.8  --  8.5 8.4*  --  8.5 8.3*   *  --  102* 84  --  95 113*   ALBUMIN  --   --   --   --   --  3.8 3.9   PROTTOTAL  --   --   --   --   --  7.6 7.8   BILITOTAL  --   --   --   --   --  3.5* 3.8*   ALKPHOS  --   --   --   --   --  79 84   ALT  --   --   --   --   --  85* 94*   AST  --   --   --   --   --  112*  --    TROPONIN  --   --   --   --   --  <0.015 <0.015    < > = values in this interval not displayed.     No results found for this or any previous visit (from the past 24 hour(s)).  Medications     heparin 1,000 Units/hr (11/14/21 1600)     ketamine 2 mg/mL ADULT 6 mg/hr (11/14/21 1500)     lidocaine (for analgesia)       - MEDICATION INSTRUCTIONS -       sodium chloride 50 mL/hr at 11/14/21 1500     Warfarin Therapy Reminder         acetaminophen  975 mg Oral Q8H     ARIPiprazole  2 mg Oral Daily     azithromycin  500 mg  Intravenous Q24H     cefTRIAXone  1 g Intravenous Q24H     deferasirox  1,440 mg Oral QPM     DULoxetine  30 mg Oral BID     fluticasone-vilanterol  1 puff Inhalation Daily     hydroxyurea  2,000 mg Oral Daily     morphine  15 mg Oral Q12H     pantoprazole  40 mg Oral QAM AC     sodium chloride (PF)  3 mL Intracatheter Q8H     warfarin ANTICOAGULANT  5 mg Oral ONCE at 18:00

## 2021-11-14 NOTE — PROVIDER NOTIFICATION
"Provider Notification     Grant Degroot ([id number]) paged at 0913:   \"FYI pt is reporting that PO dilaudid does not manage her pain and is requesting IV dilaudid. Thanks.\"       Response: Will put in orders for a dose range of PO dilaudid and will increase the ketamine drip to 8 mg/hr.      Will continue with plan of care.      "

## 2021-11-14 NOTE — PLAN OF CARE
[id number]    VSS on O2 3L via nasal cannula. Denies SOB. Denies nausea. C/o pain, ketamine drip in place. Gave dilaudid x1 and scheduled tylenol with no relief. MD aware. Dilaudid dose range ordered and ketamine drip increased to 8 mg/hr. Heparin drip in place. 2300 unit bolus given per protocol. Rate increased to 850 units/hr per protocol. Xa recheck scheduled for AM. 1 episode of urinary incontinence overnight. UA still needed. Pt triggered sepsis with AM lab draws. Awaiting lactic acid level. NS infusion increased to 50 mL/hr. Continue with plan of care.

## 2021-11-14 NOTE — PROGRESS NOTES
"Xcover:  Called to bedside to discuss w/patient regarding her frustrations. Listened to patient, she expresses that she is mostly frustrated that they ask what she wants and then don't really take her opinions into consideration, and states that her pain has been poorly controlled \"all day\". Discussed that we cannot make any drastic changes overnight and discussed with patient that there are several doctors involved who are trying to help her get to a good pain plan. She does not want the lidocaine drip but we discussed that this was an option if she changes her mind. Encouraged her to take her oral dilaudid. She did mention several times that IV dilaudid works better for her but I again encouraged her to take the oral medications for now, which she was amenable to.     Daya Michael MD MPH  Med/Peds  [phone]      "

## 2021-11-14 NOTE — PROVIDER NOTIFICATION
"\"Hi, pt is expressing inpatience and frustration with lack of being heard by team regarding pain control, requesting a provider at the bedside. Was wondering if you could come by and have a conversation with them? Thank you    Pager #[phone]\"    Provider at bedside, see note  "

## 2021-11-14 NOTE — PROGRESS NOTES
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    1. Continue ketamine gtt    2. Continue PO dilaudid 4-6 mg q3h PRN.  3. Consider starting IV lidocaine @ 1 mg/kg/hr ideal body weight given the inflammatory nature of her pain. We counseled her to let her nurse know if she experiences tinnitus or circumoral numbness.  4. Continue scheduled acetaminophen.     Pain Service will Continue to follow at this time.     Recommendations were discussed with Dr. Arroyo  Plan was reviewed by the Pain Service staff anesthesiologist Dr. Tom    Thank you for consulting the Inpatient Pain Management Service.  The above recommendations are to be acted upon at the primary team s discretion.      To reach us:  Mon - Friday 8 AM - 3 PM: Pager [phone] (Text Page)  After hours, weekends and holidays: Primary service should call [phone] the answering service for the on-call pain specialist    Subjective: Patient resting during interview. Unable to evaluate her pain.     INPATIENT MEDICATIONS PERTINENT TO PAIN CONSULT:   Medications related to Pain Management (From now, onward)    Start     Dose/Rate Route Frequency Ordered Stop    11/12/21 1900  ketamine (KETALAR) 2 mg/mL in sodium chloride 0.9 % 50 mL ANALGESIA infusion         4 mg/hr  2 mL/hr  Intravenous CONTINUOUS 11/12/21 1755      11/11/21 1742  oxyCODONE IR (ROXICODONE) tablet 20 mg         20 mg Oral EVERY 4  "HOURS PRN 11/11/21 1743      11/11/21 0103  lidocaine 1 % 0.1-1 mL         0.1-1 mL Other EVERY 1 HOUR PRN 11/11/21 0103      11/11/21 0103  lidocaine (LMX4) cream          Topical EVERY 1 HOUR PRN 11/11/21 0103      11/11/21 0103  acetaminophen (TYLENOL) tablet 975 mg         975 mg Oral EVERY 8 HOURS 11/11/21 0103      11/11/21 0103  senna-docusate (SENOKOT-S/PERICOLACE) 8.6-50 MG per tablet 1 tablet        \"Or\" Linked Group Details    1 tablet Oral 2 TIMES DAILY PRN 11/11/21 0103      11/11/21 0103  senna-docusate (SENOKOT-S/PERICOLACE) 8.6-50 MG per tablet 2 tablet        \"Or\" Linked Group Details    2 tablet Oral 2 TIMES DAILY PRN 11/11/21 0103      11/10/21 2220  DULoxetine (CYMBALTA) DR capsule 30 mg         30 mg Oral 2 TIMES DAILY 11/10/21 2218      11/10/21 2220  morphine (MS CONTIN) 12 hr tablet 15 mg         15 mg Oral EVERY 12 HOURS 11/10/21 2218      11/10/21 2214  hydrOXYzine (ATARAX) tablet 25 mg         25 mg Oral 3 TIMES DAILY PRN 11/10/21 2218      11/10/21 2213  diphenhydrAMINE (BENADRYL) capsule 25-50 mg         25-50 mg Oral AT BEDTIME PRN 11/10/21 2218      11/10/21 2213  acetaminophen (TYLENOL) tablet 650 mg         650 mg Oral EVERY 6 HOURS PRN 11/10/21 2218            CURRENT MEDICATIONS:   Current Facility-Administered Medications Ordered in Epic   Medication Dose Route Frequency Provider Last Rate Last Admin     acetaminophen (TYLENOL) tablet 650 mg  650 mg Oral Q6H PRN Lance Camarena MD   650 mg at 11/13/21 0457     acetaminophen (TYLENOL) tablet 975 mg  975 mg Oral Q8H Lance Camarena MD   975 mg at 11/14/21 0236     albuterol (PROAIR HFA/PROVENTIL HFA/VENTOLIN HFA) 108 (90 Base) MCG/ACT inhaler 2 puff  2 puff Inhalation Q6H PRN Bulti, Lance, MD         albuterol (PROVENTIL) neb solution 2.5 mg  2.5 mg Nebulization Q6H PRN Lance Camarena MD   2.5 mg at 11/12/21 1356     ARIPiprazole (ABILIFY) tablet 2 mg  2 mg Oral Daily Terese Arroyo MD   2 mg at 11/14/21 0911     azithromycin (ZITHROMAX) " 500 mg in sodium chloride 0.9 % 250 mL intermittent infusion  500 mg Intravenous Q24H Terese Arroyo MD   500 mg at 11/13/21 1528     cefTRIAXone (ROCEPHIN) 1 g vial to attach to  mL bag for ADULTS or NS 50 mL bag for PEDS  1 g Intravenous Q24H Terese Arroyo MD   1 g at 11/13/21 1244     deferasirox (JADENU) tablet 1,440 mg  1,440 mg Oral QPM Lance Camarena MD   1,440 mg at 11/13/21 2036     diphenhydrAMINE (BENADRYL) capsule 25-50 mg  25-50 mg Oral At Bedtime PRN Lance Camarena MD         DULoxetine (CYMBALTA) DR capsule 30 mg  30 mg Oral BID Lance Camarena MD   30 mg at 11/14/21 0911     fluticasone-vilanterol (BREO ELLIPTA) 200-25 MCG/INH inhaler 1 puff  1 puff Inhalation Daily Reta Miramontes MD   1 puff at 11/14/21 0917     heparin infusion 25,000 units in D5W 250 mL ANTICOAGULANT  0-5,000 Units/hr Intravenous Continuous Reta Miramontes MD 8.5 mL/hr at 11/14/21 0731 850 Units/hr at 11/14/21 0731     HYDROmorphone (DILAUDID) tablet 6 mg  6 mg Oral Q3H PRN Mikayla Burns MD         hydroxyurea (HYDREA) capsule 2,000 mg  2,000 mg Oral Daily Reta Miramontes MD   2,000 mg at 11/14/21 0911     hydrOXYzine (ATARAX) tablet 25 mg  25 mg Oral TID PRN Lance Camarena MD         ketamine (KETALAR) 2 mg/mL in sodium chloride 0.9 % 50 mL ANALGESIA infusion  6 mg/hr Intravenous Continuous Terese Arroyo MD 3 mL/hr at 11/14/21 0906 6 mg/hr at 11/14/21 0906     lidocaine 1 % 0.1-1 mL  0.1-1 mL Other Q1H PRN Lance Camarena MD         melatonin tablet 3 mg  3 mg Oral At Bedtime PRN Lance Camarena MD         morphine (MS CONTIN) 12 hr tablet 15 mg  15 mg Oral Q12H Lance Camarena MD   15 mg at 11/13/21 2221     naloxone (NARCAN) injection 0.2 mg  0.2 mg Intravenous Q2 Min PRN Reta Miramontes MD        Or     naloxone (NARCAN) injection 0.4 mg  0.4 mg Intravenous Q2 Min PRN Reta Miramontes MD        Or     naloxone (NARCAN) injection 0.2 mg  0.2 mg Intramuscular Q2 Min PRN Reta Miramontes MD        Or     naloxone  (NARCAN) injection 0.4 mg  0.4 mg Intramuscular Q2 Min PRN Reta Miramontes MD         ondansetron (ZOFRAN) tablet 8 mg  8 mg Oral Q8H PRN Lance Camarena MD         pantoprazole (PROTONIX) EC tablet 40 mg  40 mg Oral QAM AC Reta Miramontes MD   40 mg at 11/14/21 0911     Patient is already receiving anticoagulation with heparin, enoxaparin (LOVENOX), warfarin (COUMADIN)  or other anticoagulant medication   Does not apply Continuous PRN Lance Camarena MD         senna-docusate (SENOKOT-S/PERICOLACE) 8.6-50 MG per tablet 1 tablet  1 tablet Oral BID PRN Lance Camarena MD        Or     senna-docusate (SENOKOT-S/PERICOLACE) 8.6-50 MG per tablet 2 tablet  2 tablet Oral BID PRN Lance Camarena MD         sodium chloride (PF) 0.9% PF flush 3 mL  3 mL Intracatheter Q8H Lance Camarena MD   3 mL at 11/13/21 0257     sodium chloride (PF) 0.9% PF flush 3 mL  3 mL Intracatheter q1 min prn Lance Camarena MD   20 mL at 11/14/21 0620     sodium chloride 0.9% infusion   Intravenous Continuous Terese Arroyo MD 50 mL/hr at 11/14/21 0659 Rate Verify at 11/14/21 0659     Warfarin Therapy Reminder (Check START DATE - warfarin may be starting in the FUTURE)  1 each Does not apply Continuous PRN Terese Arroyo MD         No current [facility]-ordered outpatient medications on file.       HOME/PREVIOUS MEDICATIONS:   Prior to Admission medications    Medication Sig Start Date End Date Taking? Authorizing Provider   acetaminophen (TYLENOL) 325 MG tablet Take 2 tablets (650 mg) by mouth every 6 hours as needed for mild pain 9/20/21  Yes Eric Duncan MD   albuterol (PROAIR HFA/PROVENTIL HFA/VENTOLIN HFA) 108 (90 Base) MCG/ACT inhaler Inhale 2 puffs into the lungs every 6 hours as needed for shortness of breath / dyspnea or wheezing 9/20/21  Yes Eric Duncan MD   ARIPiprazole (ABILIFY) 2 MG tablet Take 1 tablet (2 mg) by mouth daily 9/20/21  Yes Eric Duncan MD   aspirin (ASA) 81 MG chewable tablet Take 1 tablet (81 mg) by mouth  daily 11/1/21  Yes Andrei Machado PA   budesonide-formoterol (SYMBICORT) 160-4.5 MCG/ACT Inhaler Inhale 2 puffs into the lungs 2 times daily 9/20/21  Yes Eric Duncan MD   dabigatran ANTICOAGULANT (PRADAXA) 150 MG capsule Take 1 capsule (150 mg) by mouth 2 times daily Store in original 's bottle or blister pack; use within 120 days of opening. 11/1/21  Yes Andrei Machado PA   diphenhydrAMINE (BENADRYL) 25 MG capsule Take 1-2 capsules (25-50 mg) by mouth nightly as needed for sleep 9/20/21  Yes Eric Duncan MD   DULoxetine (CYMBALTA) 30 MG capsule Take 1 capsule (30 mg) by mouth 2 times daily 11/1/21  Yes Andrei Machado PA   EPINEPHrine (ANY BX GENERIC EQUIV) 0.3 MG/0.3ML injection 2-pack Inject 0.3 mLs (0.3 mg) into the muscle as needed for anaphylaxis 2/24/21  Yes Andrei Machado PA   Hydroxyurea 1000 MG TABS Take 2,000 mg by mouth daily 9/20/21  Yes Eric Duncan MD   hydrOXYzine (ATARAX) 25 MG tablet Take 1 tablet (25 mg) by mouth 3 times daily as needed for anxiety 5/19/21  Yes Andrei Machado PA   JADENU 360 MG tablet Take 4 tablets (1,440 mg) by mouth every evening 9/20/21  Yes Eric Duncan MD   morphine (MS CONTIN) 15 MG CR tablet Take 1 tablet (15 mg) by mouth every 12 hours Take 1/2 tablet every 12 hours initially 10/18/21  Yes Eric Duncan MD   naloxone (NARCAN) 4 MG/0.1ML nasal spray Spray 1 spray (4 mg) into one nostril alternating nostrils once as needed for opioid reversal every 2-3 minutes until assistance arrives 4/27/20  Yes Eric Duncan MD   omeprazole (PRILOSEC) 20 MG DR capsule Take 1 capsule (20 mg) by mouth daily 9/20/21  Yes Eric Duncan MD   ondansetron (ZOFRAN) 8 MG tablet Take 1 tablet (8 mg) by mouth every 8 hours as needed 9/20/21  Yes Eric Duncan MD   oxyCODONE IR (ROXICODONE) 15 MG tablet Take 1 tablet (15mg) by mouth every 4-6 hours as needed for severe pain. Goal 4 per day.  Max 6 per day. 11/10/21  Yes Andrei Machado PA   albuterol (PROVENTIL) (2.5 MG/3ML) 0.083% neb solution Take 1 vial (2.5 mg) by nebulization every 6 hours as needed for shortness of breath / dyspnea or wheezing  Patient not taking: Reported on 11/10/2021 9/20/21   Eric Duncan MD   lidocaine-prilocaine (EMLA) 2.5-2.5 % external cream Apply topically once for 1 dose Use for port site as needed  Patient not taking: Reported on 11/10/2021 9/20/21 10/18/21  Eric Duncan MD   medroxyPROGESTERone (DEPO-PROVERA) 150 MG/ML IM injection Inject 150 mg into the muscle 5/14/20 10/18/21  Reported, Patient       ALLERGIES:    Allergies   Allergen Reactions     Contrast Dye      Hives and breathing issues     Fish-Derived Products Hives     Seafood Hives     Diagnostic X-Ray Materials      Gadolinium         PAST MEDICAL AND PSYCHIATRIC HISTORY:    Past Medical History:   Diagnosis Date     Anxiety      Bleeding disorder (H)      Blood clotting disorder (H)      Cerebral infarction (H) 2015     Cognitive developmental delay     low IQ. 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 (H)     right hand contractures     Iron overload due to repeated red blood cell transfusions      Migraines      Multiple subsegmental pulmonary emboli without acute cor pulmonale (H) 02/01/2021     Oppositional defiant behavior      Superficial venous thrombosis of arm, right 03/25/2021     Uncomplicated asthma        PAST SURGICAL HISTORY:   Past Surgical History:   Procedure Laterality Date     AS INSERT TUNNELED CV 2 CATH W/O PORT/PUMP       C BREAST REDUCTION (INCLUDES LIPO) TIER 3 Bilateral 04/23/2019     CHOLECYSTECTOMY       INSERT PORT VASCULAR ACCESS Left 4/21/2021    Procedure: INSERTION, VASCULAR ACCESS PORT (NOT SURE ON SIDE UNTIL REMOVAL);  Surgeon: Rajan More MD;  Location: [facility] [place] OR      IR CHEST PORT PLACEMENT > 5 YRS OF AGE  4/21/2021     IR CVC NON TUNNEL LINE REMOVAL  5/6/2021     IR CVC NON TUNNEL PLACEMENT  04/07/2020     IR CVC NON TUNNEL PLACEMENT  4/30/2021     IR PORT REMOVAL LEFT  4/21/2021     REMOVE PORT VASCULAR ACCESS Left 4/21/2021    Procedure: REMOVAL, VASCULAR ACCESS PORT LEFT;  Surgeon: Rajan More MD;  Location: [facility] OR     REPAIR TENDON ELBOW Right 10/02/2019    Procedure: Right Elbow Flexor Lengthening, Flexor Pronator Slide Of Wrist and Finger, Thumb Adductor Lengthening;  Surgeon: Anai Franco MD;  Location: [facility] OR     TONSILLECTOMY Bilateral 10/02/2019    Procedure: Bilateral Tonsillectomy;  Surgeon: Farhana Guy MD;  Location: [facility] OR       FAMILY HISTORY: family history includes Asthma in her mother; Hypertension in her mother; Sickle Cell Trait in her father and mother.    HEALTH & LIFESTYLE PRACTICES:   Tobacco:  reports that she has never smoked. She has never used smokeless tobacco.  Alcohol:  reports previous alcohol use.  Illicit drugs:  reports no history of drug use.    LABORATORY VALUES:   Last Basic Metabolic Panel:  Lab Results   Component Value Date     11/13/2021     07/10/2021      Lab Results   Component Value Date    POTASSIUM 4.6 11/13/2021    POTASSIUM 3.7 11/11/2021    POTASSIUM 3.9 07/10/2021     Lab Results   Component Value Date    CHLORIDE 110 11/13/2021    CHLORIDE 112 07/10/2021     Lab Results   Component Value Date    MICAH 8.5 11/13/2021    MICAH 8.4 07/10/2021     Lab Results   Component Value Date    CO2 23 11/13/2021    CO2 22 07/10/2021     Lab Results   Component Value Date    BUN 10 11/13/2021    BUN 9 07/10/2021     Lab Results   Component Value Date    CR 0.52 11/13/2021    CR 0.50 07/10/2021     Lab Results   Component Value Date     11/13/2021     07/10/2021       CBC results:  Lab Results   Component Value Date    WBC 12.1 11/13/2021    WBC 15.8 07/10/2021     Lab Results   Component  "Value Date    HGB 6.9 11/13/2021    HGB 7.8 07/10/2021     Lab Results   Component Value Date    HCT 19.0 11/13/2021    HCT 22.2 07/10/2021     Lab Results   Component Value Date     11/13/2021     07/10/2021       DIAGNOSTIC TESTS:   Labs above reviewed as well as additional relevant diagnostic studies from the EPIC record.     PHYSICAL EXAMINATION:  /70 (BP Location: Left leg)   Pulse 103   Temp 36.1  C (97  F) (Oral)   Resp 18   Ht 1.626 m (5' 4\")   Wt 76.9 kg (169 lb 8.5 oz)   SpO2 96%   BMI 29.10 kg/m       EXAM:  General: Asleep in bed.    Len Rivera DO  November 14, 2021  Inpatient Pain Management Service            "

## 2021-11-14 NOTE — PLAN OF CARE
[phone]: VSS, afebrile this shift, denies nausea or sob. On cont pulse ox, satting mid 90's. Hep gtt @ 700 units/hr, recheck xa @ 2200 pending. Ketamine gtt @ 5 mg/hr for pain. Pt reporting poor appetite and PO intake, continue w/plan of care.

## 2021-11-15 ENCOUNTER — APPOINTMENT (OUTPATIENT)
Dept: GENERAL RADIOLOGY | Facility: CLINIC | Age: 21
DRG: 175 | End: 2021-11-15
Attending: INTERNAL MEDICINE
Payer: COMMERCIAL

## 2021-11-15 ENCOUNTER — APPOINTMENT (OUTPATIENT)
Dept: NUCLEAR MEDICINE | Facility: CLINIC | Age: 21
DRG: 175 | End: 2021-11-15
Attending: INTERNAL MEDICINE
Payer: COMMERCIAL

## 2021-11-15 LAB
ALBUMIN UR-MCNC: NEGATIVE MG/DL
APPEARANCE UR: ABNORMAL
BASE EXCESS BLDA CALC-SCNC: -0.5 MMOL/L (ref -9–1.8)
BILIRUB UR QL STRIP: NEGATIVE
CARDIOLIPIN IGG SER IA-ACNC: <2 GPL-U/ML
CARDIOLIPIN IGG SER IA-ACNC: NEGATIVE
CARDIOLIPIN IGM SER IA-ACNC: <2 MPL-U/ML
CARDIOLIPIN IGM SER IA-ACNC: NEGATIVE
COLOR UR AUTO: ABNORMAL
ERYTHROCYTE [DISTWIDTH] IN BLOOD BY AUTOMATED COUNT: 20.8 % (ref 10–15)
GLUCOSE UR STRIP-MCNC: NEGATIVE MG/DL
HCO3 BLD-SCNC: 25 MMOL/L (ref 21–28)
HCT VFR BLD AUTO: 20.5 % (ref 35–47)
HGB BLD-MCNC: 7.3 G/DL (ref 11.7–15.7)
HGB UR QL STRIP: ABNORMAL
INR PPP: 1.88 (ref 0.85–1.15)
KETONES UR STRIP-MCNC: NEGATIVE MG/DL
LEUKOCYTE ESTERASE UR QL STRIP: ABNORMAL
MCH RBC QN AUTO: 31.5 PG (ref 26.5–33)
MCHC RBC AUTO-ENTMCNC: 35.6 G/DL (ref 31.5–36.5)
MCV RBC AUTO: 88 FL (ref 78–100)
NITRATE UR QL: NEGATIVE
O2/TOTAL GAS SETTING VFR VENT: 2 %
PCO2 BLD: 43 MM HG (ref 35–45)
PH BLD: 7.37 [PH] (ref 7.35–7.45)
PH UR STRIP: 5.5 [PH] (ref 5–7)
PLATELET # BLD AUTO: 322 10E3/UL (ref 150–450)
PO2 BLD: 119 MM HG (ref 80–105)
POTASSIUM BLD-SCNC: 3.6 MMOL/L (ref 3.4–5.3)
RADIOLOGIST FLAGS: ABNORMAL
RBC # BLD AUTO: 2.32 10E6/UL (ref 3.8–5.2)
RBC URINE: 20 /HPF
SP GR UR STRIP: 1.01 (ref 1–1.03)
SQUAMOUS EPITHELIAL: 15 /HPF
UFH PPP CHRO-ACNC: 0.11 IU/ML
UFH PPP CHRO-ACNC: 0.63 IU/ML
UFH PPP CHRO-ACNC: 0.81 IU/ML
UROBILINOGEN UR STRIP-MCNC: 2 MG/DL
WBC # BLD AUTO: 10.5 10E3/UL (ref 4–11)
WBC URINE: 18 /HPF

## 2021-11-15 PROCEDURE — 85520 HEPARIN ASSAY: CPT | Performed by: INTERNAL MEDICINE

## 2021-11-15 PROCEDURE — 250N000011 HC RX IP 250 OP 636: Performed by: INTERNAL MEDICINE

## 2021-11-15 PROCEDURE — 343N000001 HC RX 343: Performed by: INTERNAL MEDICINE

## 2021-11-15 PROCEDURE — 250N000013 HC RX MED GY IP 250 OP 250 PS 637: Performed by: INTERNAL MEDICINE

## 2021-11-15 PROCEDURE — 84132 ASSAY OF SERUM POTASSIUM: CPT | Performed by: INTERNAL MEDICINE

## 2021-11-15 PROCEDURE — 120N000002 HC R&B MED SURG/OB UMMC

## 2021-11-15 PROCEDURE — 85014 HEMATOCRIT: CPT | Performed by: INTERNAL MEDICINE

## 2021-11-15 PROCEDURE — 999N000111 HC STATISTIC OT IP EVAL DEFER: Performed by: OCCUPATIONAL THERAPIST

## 2021-11-15 PROCEDURE — 250N000011 HC RX IP 250 OP 636: Performed by: EMERGENCY MEDICINE

## 2021-11-15 PROCEDURE — 78580 LUNG PERFUSION IMAGING: CPT | Mod: 26 | Performed by: RADIOLOGY

## 2021-11-15 PROCEDURE — 85610 PROTHROMBIN TIME: CPT | Performed by: INTERNAL MEDICINE

## 2021-11-15 PROCEDURE — 36591 DRAW BLOOD OFF VENOUS DEVICE: CPT | Performed by: INTERNAL MEDICINE

## 2021-11-15 PROCEDURE — 99232 SBSQ HOSP IP/OBS MODERATE 35: CPT | Performed by: ANESTHESIOLOGY

## 2021-11-15 PROCEDURE — 250N000011 HC RX IP 250 OP 636

## 2021-11-15 PROCEDURE — 258N000003 HC RX IP 258 OP 636: Performed by: EMERGENCY MEDICINE

## 2021-11-15 PROCEDURE — 71045 X-RAY EXAM CHEST 1 VIEW: CPT

## 2021-11-15 PROCEDURE — 82803 BLOOD GASES ANY COMBINATION: CPT | Performed by: INTERNAL MEDICINE

## 2021-11-15 PROCEDURE — A9540 TC99M MAA: HCPCS | Performed by: INTERNAL MEDICINE

## 2021-11-15 PROCEDURE — 258N000003 HC RX IP 258 OP 636: Performed by: INTERNAL MEDICINE

## 2021-11-15 PROCEDURE — 81001 URINALYSIS AUTO W/SCOPE: CPT | Performed by: INTERNAL MEDICINE

## 2021-11-15 PROCEDURE — 71045 X-RAY EXAM CHEST 1 VIEW: CPT | Mod: 26 | Performed by: RADIOLOGY

## 2021-11-15 PROCEDURE — 250N000013 HC RX MED GY IP 250 OP 250 PS 637: Performed by: EMERGENCY MEDICINE

## 2021-11-15 PROCEDURE — 99232 SBSQ HOSP IP/OBS MODERATE 35: CPT | Performed by: PHYSICIAN ASSISTANT

## 2021-11-15 PROCEDURE — 36600 WITHDRAWAL OF ARTERIAL BLOOD: CPT

## 2021-11-15 PROCEDURE — 78580 LUNG PERFUSION IMAGING: CPT

## 2021-11-15 PROCEDURE — 999N000157 HC STATISTIC RCP TIME EA 10 MIN

## 2021-11-15 PROCEDURE — 99233 SBSQ HOSP IP/OBS HIGH 50: CPT | Performed by: INTERNAL MEDICINE

## 2021-11-15 PROCEDURE — 250N000013 HC RX MED GY IP 250 OP 250 PS 637

## 2021-11-15 PROCEDURE — 87086 URINE CULTURE/COLONY COUNT: CPT | Performed by: INTERNAL MEDICINE

## 2021-11-15 RX ORDER — WARFARIN SODIUM 7.5 MG/1
7.5 TABLET ORAL
Status: COMPLETED | OUTPATIENT
Start: 2021-11-15 | End: 2021-11-15

## 2021-11-15 RX ORDER — HYDROMORPHONE HYDROCHLORIDE 1 MG/ML
0.5 INJECTION, SOLUTION INTRAMUSCULAR; INTRAVENOUS; SUBCUTANEOUS EVERY 8 HOURS PRN
Status: DISCONTINUED | OUTPATIENT
Start: 2021-11-15 | End: 2021-11-18

## 2021-11-15 RX ADMIN — MORPHINE SULFATE 15 MG: 15 TABLET, EXTENDED RELEASE ORAL at 10:37

## 2021-11-15 RX ADMIN — HYDROMORPHONE HYDROCHLORIDE 0.5 MG: 1 INJECTION, SOLUTION INTRAMUSCULAR; INTRAVENOUS; SUBCUTANEOUS at 18:14

## 2021-11-15 RX ADMIN — ARIPIPRAZOLE 2 MG: 2 TABLET ORAL at 09:32

## 2021-11-15 RX ADMIN — MORPHINE SULFATE 15 MG: 15 TABLET, EXTENDED RELEASE ORAL at 21:50

## 2021-11-15 RX ADMIN — ONDANSETRON HYDROCHLORIDE 8 MG: 8 TABLET, FILM COATED ORAL at 14:09

## 2021-11-15 RX ADMIN — HYDROXYUREA 2000 MG: 500 CAPSULE ORAL at 09:33

## 2021-11-15 RX ADMIN — SODIUM CHLORIDE: 9 INJECTION, SOLUTION INTRAVENOUS at 09:52

## 2021-11-15 RX ADMIN — AZITHROMYCIN MONOHYDRATE 500 MG: 500 INJECTION, POWDER, LYOPHILIZED, FOR SOLUTION INTRAVENOUS at 13:35

## 2021-11-15 RX ADMIN — PANTOPRAZOLE SODIUM 40 MG: 40 TABLET, DELAYED RELEASE ORAL at 09:32

## 2021-11-15 RX ADMIN — CEFTRIAXONE 1 G: 1 INJECTION, POWDER, FOR SOLUTION INTRAMUSCULAR; INTRAVENOUS at 12:05

## 2021-11-15 RX ADMIN — ACETAMINOPHEN 975 MG: 325 TABLET, FILM COATED ORAL at 09:32

## 2021-11-15 RX ADMIN — DULOXETINE 30 MG: 30 CAPSULE, DELAYED RELEASE ORAL at 09:32

## 2021-11-15 RX ADMIN — DEFERASIROX 1440 MG: 360 TABLET ORAL at 20:57

## 2021-11-15 RX ADMIN — HYDROMORPHONE HYDROCHLORIDE 6 MG: 4 TABLET ORAL at 05:02

## 2021-11-15 RX ADMIN — ACETAMINOPHEN 975 MG: 325 TABLET, FILM COATED ORAL at 18:10

## 2021-11-15 RX ADMIN — WARFARIN SODIUM 7.5 MG: 7.5 TABLET ORAL at 18:10

## 2021-11-15 RX ADMIN — KIT FOR THE PREPARATION OF TECHNETIUM TC 99M ALBUMIN AGGREGATED 7 MCI.: 2.5 INJECTION, POWDER, FOR SOLUTION INTRAVENOUS at 15:06

## 2021-11-15 RX ADMIN — HEPARIN SODIUM 1150 UNITS/HR: 1000 INJECTION INTRAVENOUS; SUBCUTANEOUS at 12:05

## 2021-11-15 RX ADMIN — HYDROMORPHONE HYDROCHLORIDE 0.5 MG: 1 INJECTION, SOLUTION INTRAMUSCULAR; INTRAVENOUS; SUBCUTANEOUS at 09:14

## 2021-11-15 ASSESSMENT — ACTIVITIES OF DAILY LIVING (ADL)
ADLS_ACUITY_SCORE: 6
ADLS_ACUITY_SCORE: 7
ADLS_ACUITY_SCORE: 6
ADLS_ACUITY_SCORE: 7
ADLS_ACUITY_SCORE: 6
ADLS_ACUITY_SCORE: 6
ADLS_ACUITY_SCORE: 7
ADLS_ACUITY_SCORE: 6
ADLS_ACUITY_SCORE: 6
ADLS_ACUITY_SCORE: 7
ADLS_ACUITY_SCORE: 6
ADLS_ACUITY_SCORE: 7
ADLS_ACUITY_SCORE: 7
ADLS_ACUITY_SCORE: 6
ADLS_ACUITY_SCORE: 7
ADLS_ACUITY_SCORE: 6
ADLS_ACUITY_SCORE: 7
ADLS_ACUITY_SCORE: 6

## 2021-11-15 ASSESSMENT — MIFFLIN-ST. JEOR: SCORE: 1527

## 2021-11-15 NOTE — PROGRESS NOTES
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 Currently refusing all multimodal adjuncts and asking explicitly for IV HM or Morphine to treat her pain. States that \"pills don't work.\"    1. Discussed at length with the patient about the benefits of multimodal analgesia therapy and how all the previously recommended medications could help with her pain. Patient did not want to pursue the use of either continuous IV ketamine or lidocaine at this time, but did state that she wanted IV HM or IV morphine for her pain.  2. Continue PTA Morphine ER  3. Continue scheduled APAP  4. May continue oral HM PRN for her acute pain with a plan to switch her back to her home oxycodone once acute phase of pain improves  5. Consider the addition of gabapentinoids    Pain Service will sign off.    Recommendations were discussed with Dr. Burns.    Thank you for consulting the Inpatient Pain Management Service.  The above recommendations are to be acted upon at the primary team s discretion.      To reach us:  Mon - Friday 8 AM - 3 PM: Pager [phone] (Text Page)  After hours, weekends and holidays: Primary service should call [phone] the answering service for the on-call pain specialist    Subjective: Patient resting during interview. Unable to evaluate her pain.     INPATIENT MEDICATIONS PERTINENT TO PAIN CONSULT:   Medications related to Pain Management (From now, " "onward)    Start     Dose/Rate Route Frequency Ordered Stop    11/12/21 1900  ketamine (KETALAR) 2 mg/mL in sodium chloride 0.9 % 50 mL ANALGESIA infusion         4 mg/hr  2 mL/hr  Intravenous CONTINUOUS 11/12/21 1755      11/11/21 1742  oxyCODONE IR (ROXICODONE) tablet 20 mg         20 mg Oral EVERY 4 HOURS PRN 11/11/21 1743      11/11/21 0103  lidocaine 1 % 0.1-1 mL         0.1-1 mL Other EVERY 1 HOUR PRN 11/11/21 0103      11/11/21 0103  lidocaine (LMX4) cream          Topical EVERY 1 HOUR PRN 11/11/21 0103      11/11/21 0103  acetaminophen (TYLENOL) tablet 975 mg         975 mg Oral EVERY 8 HOURS 11/11/21 0103 11/11/21 0103  senna-docusate (SENOKOT-S/PERICOLACE) 8.6-50 MG per tablet 1 tablet        \"Or\" Linked Group Details    1 tablet Oral 2 TIMES DAILY PRN 11/11/21 0103      11/11/21 0103  senna-docusate (SENOKOT-S/PERICOLACE) 8.6-50 MG per tablet 2 tablet        \"Or\" Linked Group Details    2 tablet Oral 2 TIMES DAILY PRN 11/11/21 0103      11/10/21 2220  DULoxetine (CYMBALTA) DR capsule 30 mg         30 mg Oral 2 TIMES DAILY 11/10/21 2218      11/10/21 2220  morphine (MS CONTIN) 12 hr tablet 15 mg         15 mg Oral EVERY 12 HOURS 11/10/21 2218      11/10/21 2214  hydrOXYzine (ATARAX) tablet 25 mg         25 mg Oral 3 TIMES DAILY PRN 11/10/21 2218      11/10/21 2213  diphenhydrAMINE (BENADRYL) capsule 25-50 mg         25-50 mg Oral AT BEDTIME PRN 11/10/21 2218      11/10/21 2213  acetaminophen (TYLENOL) tablet 650 mg         650 mg Oral EVERY 6 HOURS PRN 11/10/21 2218            CURRENT MEDICATIONS:   Current Facility-Administered Medications Ordered in Epic   Medication Dose Route Frequency Provider Last Rate Last Admin     acetaminophen (TYLENOL) tablet 650 mg  650 mg Oral Q6H PRN Lance Camarena MD   650 mg at 11/13/21 0457     acetaminophen (TYLENOL) tablet 975 mg  975 mg Oral Q8H Lance Camarena MD   975 mg at 11/14/21 1813     albuterol (PROAIR HFA/PROVENTIL HFA/VENTOLIN HFA) 108 (90 Base) MCG/ACT " inhaler 2 puff  2 puff Inhalation Q6H PRN Lance Camarena MD         albuterol (PROVENTIL) neb solution 2.5 mg  2.5 mg Nebulization Q6H PRN Lance Camarena MD   2.5 mg at 11/12/21 1356     ARIPiprazole (ABILIFY) tablet 2 mg  2 mg Oral Daily Terese Arroyo MD   2 mg at 11/14/21 0911     azithromycin (ZITHROMAX) 500 mg in sodium chloride 0.9 % 250 mL intermittent infusion  500 mg Intravenous Q24H Terese Arroyo MD   500 mg at 11/14/21 1348     cefTRIAXone (ROCEPHIN) 1 g vial to attach to  mL bag for ADULTS or NS 50 mL bag for PEDS  1 g Intravenous Q24H Terese Arroyo MD   1 g at 11/14/21 1131     deferasirox (JADENU) tablet 1,440 mg  1,440 mg Oral QPM Lance Camarena MD   1,440 mg at 11/14/21 1909     diphenhydrAMINE (BENADRYL) capsule 25-50 mg  25-50 mg Oral At Bedtime PRN Lance Camarena MD         DULoxetine (CYMBALTA) DR capsule 30 mg  30 mg Oral BID Lance Camarena MD   30 mg at 11/14/21 1909     fluticasone-vilanterol (BREO ELLIPTA) 200-25 MCG/INH inhaler 1 puff  1 puff Inhalation Daily Reta Miramontes MD   1 puff at 11/11/21 0819     heparin infusion 25,000 units in D5W 250 mL ANTICOAGULANT  0-5,000 Units/hr Intravenous Continuous Reta Miramontes MD 11.5 mL/hr at 11/15/21 0709 1,150 Units/hr at 11/15/21 0709     HYDROmorphone (DILAUDID) tablet 6 mg  6 mg Oral Q3H PRN Mikayla Burns MD   6 mg at 11/15/21 0502     hydroxyurea (HYDREA) capsule 2,000 mg  2,000 mg Oral Daily Reta Miramontes MD   2,000 mg at 11/14/21 0911     hydrOXYzine (ATARAX) tablet 25 mg  25 mg Oral TID PRN Lance Camarena MD         [Held by provider] ketamine (KETALAR) 2 mg/mL in sodium chloride 0.9 % 50 mL ANALGESIA infusion  6 mg/hr Intravenous Continuous Terese Arroyo MD   Stopped at 11/14/21 1911     lidocaine 1 % 0.1-1 mL  0.1-1 mL Other Q1H PRN Lance Camarena MD         lidocaine 400 mg in D5W 50 mL (ADULT STD) - for ANALGESIA  1 mg/kg/hr (Ideal) Intravenous Continuous Len Rivera DO         melatonin tablet 3 mg  3 mg Oral  At Bedtime PRN Lance Camarena MD         morphine (MS CONTIN) 12 hr tablet 15 mg  15 mg Oral Q12H Lance Camarena MD   15 mg at 11/14/21 2227     naloxone (NARCAN) injection 0.2 mg  0.2 mg Intravenous Q2 Min PRN Reta Miramontes MD        Or     naloxone (NARCAN) injection 0.4 mg  0.4 mg Intravenous Q2 Min PRN Reta Miramontes MD        Or     naloxone (NARCAN) injection 0.2 mg  0.2 mg Intramuscular Q2 Min PRN Reta Miramontes MD        Or     naloxone (NARCAN) injection 0.4 mg  0.4 mg Intramuscular Q2 Min PRN Reta Miramontes MD         ondansetron (ZOFRAN) tablet 8 mg  8 mg Oral Q8H PRN Lance Camarena MD         pantoprazole (PROTONIX) EC tablet 40 mg  40 mg Oral QAM AC Reta Miramontes MD   40 mg at 11/14/21 0911     Patient is already receiving anticoagulation with heparin, enoxaparin (LOVENOX), warfarin (COUMADIN)  or other anticoagulant medication   Does not apply Continuous PRN Lance Camarena MD         senna-docusate (SENOKOT-S/PERICOLACE) 8.6-50 MG per tablet 1 tablet  1 tablet Oral BID PRN Lance Camarena MD        Or     senna-docusate (SENOKOT-S/PERICOLACE) 8.6-50 MG per tablet 2 tablet  2 tablet Oral BID PRN Lance Camarena MD         sodium chloride (PF) 0.9% PF flush 3 mL  3 mL Intracatheter Q8H Lance Camarena MD   3 mL at 11/13/21 0257     sodium chloride (PF) 0.9% PF flush 3 mL  3 mL Intracatheter q1 min prn Lance Camarena MD   20 mL at 11/15/21 0558     sodium chloride 0.9% infusion   Intravenous Continuous Terese Arroyo MD 50 mL/hr at 11/15/21 0659 Rate Verify at 11/15/21 0659     Warfarin Therapy Reminder (Check START DATE - warfarin may be starting in the FUTURE)  1 each Does not apply Continuous PRN Terese Arroyo MD         No current [facility]-ordered outpatient medications on file.       HOME/PREVIOUS MEDICATIONS:   Prior to Admission medications    Medication Sig Start Date End Date Taking? Authorizing Provider   acetaminophen (TYLENOL) 325 MG tablet Take 2 tablets (650 mg) by mouth every 6 hours as  needed for mild pain 9/20/21  Yes Eric Duncan MD   albuterol (PROAIR HFA/PROVENTIL HFA/VENTOLIN HFA) 108 (90 Base) MCG/ACT inhaler Inhale 2 puffs into the lungs every 6 hours as needed for shortness of breath / dyspnea or wheezing 9/20/21  Yes Eric Duncan MD   ARIPiprazole (ABILIFY) 2 MG tablet Take 1 tablet (2 mg) by mouth daily 9/20/21  Yes Eric Duncan MD   aspirin (ASA) 81 MG chewable tablet Take 1 tablet (81 mg) by mouth daily 11/1/21  Yes Andrei Machado PA   budesonide-formoterol (SYMBICORT) 160-4.5 MCG/ACT Inhaler Inhale 2 puffs into the lungs 2 times daily 9/20/21  Yes Eric Duncan MD   dabigatran ANTICOAGULANT (PRADAXA) 150 MG capsule Take 1 capsule (150 mg) by mouth 2 times daily Store in original 's bottle or blister pack; use within 120 days of opening. 11/1/21  Yes Andrei Machado PA   diphenhydrAMINE (BENADRYL) 25 MG capsule Take 1-2 capsules (25-50 mg) by mouth nightly as needed for sleep 9/20/21  Yes Eric Duncan MD   DULoxetine (CYMBALTA) 30 MG capsule Take 1 capsule (30 mg) by mouth 2 times daily 11/1/21  Yes Andrei Machado PA   EPINEPHrine (ANY BX GENERIC EQUIV) 0.3 MG/0.3ML injection 2-pack Inject 0.3 mLs (0.3 mg) into the muscle as needed for anaphylaxis 2/24/21  Yes Andrei Machado PA   Hydroxyurea 1000 MG TABS Take 2,000 mg by mouth daily 9/20/21  Yes Eric Duncan MD   hydrOXYzine (ATARAX) 25 MG tablet Take 1 tablet (25 mg) by mouth 3 times daily as needed for anxiety 5/19/21  Yes Andrei Machado PA   JADENU 360 MG tablet Take 4 tablets (1,440 mg) by mouth every evening 9/20/21  Yes Eric Duncan MD   morphine (MS CONTIN) 15 MG CR tablet Take 1 tablet (15 mg) by mouth every 12 hours Take 1/2 tablet every 12 hours initially 10/18/21  Yes Eric Duncan MD   naloxone (NARCAN) 4 MG/0.1ML nasal spray Spray 1 spray (4 mg) into one nostril alternating nostrils once as needed for opioid  reversal every 2-3 minutes until assistance arrives 4/27/20  Yes Eric Duncan MD   omeprazole (PRILOSEC) 20 MG DR capsule Take 1 capsule (20 mg) by mouth daily 9/20/21  Yes Eric Duncan MD   ondansetron (ZOFRAN) 8 MG tablet Take 1 tablet (8 mg) by mouth every 8 hours as needed 9/20/21  Yes Eric Duncan MD   oxyCODONE IR (ROXICODONE) 15 MG tablet Take 1 tablet (15mg) by mouth every 4-6 hours as needed for severe pain. Goal 4 per day. Max 6 per day. 11/10/21  Yes Andrei Machado PA   albuterol (PROVENTIL) (2.5 MG/3ML) 0.083% neb solution Take 1 vial (2.5 mg) by nebulization every 6 hours as needed for shortness of breath / dyspnea or wheezing  Patient not taking: Reported on 11/10/2021 9/20/21   Eric Duncan MD   lidocaine-prilocaine (EMLA) 2.5-2.5 % external cream Apply topically once for 1 dose Use for port site as needed  Patient not taking: Reported on 11/10/2021 9/20/21 10/18/21  Eric Duncan MD   medroxyPROGESTERone (DEPO-PROVERA) 150 MG/ML IM injection Inject 150 mg into the muscle 5/14/20 10/18/21  Reported, Patient       ALLERGIES:    Allergies   Allergen Reactions     Contrast Dye      Hives and breathing issues     Fish-Derived Products Hives     Seafood Hives     Diagnostic X-Ray Materials      Gadolinium         PAST MEDICAL AND PSYCHIATRIC HISTORY:    Past Medical History:   Diagnosis Date     Anxiety      Bleeding disorder (H)      Blood clotting disorder (H)      Cerebral infarction (H) 2015     Cognitive developmental delay     low IQ. 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 (H)     right hand contractures     Iron overload due to repeated red blood cell transfusions      Migraines      Multiple subsegmental pulmonary emboli without acute cor pulmonale (H) 02/01/2021     Oppositional defiant behavior      Superficial  venous thrombosis of arm, right 03/25/2021     Uncomplicated asthma        PAST SURGICAL HISTORY:   Past Surgical History:   Procedure Laterality Date     AS INSERT TUNNELED CV 2 CATH W/O PORT/PUMP       C BREAST REDUCTION (INCLUDES LIPO) TIER 3 Bilateral 04/23/2019     CHOLECYSTECTOMY       INSERT PORT VASCULAR ACCESS Left 4/21/2021    Procedure: INSERTION, VASCULAR ACCESS PORT (NOT SURE ON SIDE UNTIL REMOVAL);  Surgeon: Rajan More MD;  Location: [facility] OR     IR CHEST PORT PLACEMENT > 5 YRS OF AGE  4/21/2021     IR CVC NON TUNNEL LINE REMOVAL  5/6/2021     IR CVC NON TUNNEL PLACEMENT  04/07/2020     IR CVC NON TUNNEL PLACEMENT  4/30/2021     IR PORT REMOVAL LEFT  4/21/2021     REMOVE PORT VASCULAR ACCESS Left 4/21/2021    Procedure: REMOVAL, VASCULAR ACCESS PORT LEFT;  Surgeon: Rajan More MD;  Location: [facility] OR     REPAIR TENDON ELBOW Right 10/02/2019    Procedure: Right Elbow Flexor Lengthening, Flexor Pronator Slide Of Wrist and Finger, Thumb Adductor Lengthening;  Surgeon: Ania Franco MD;  Location: [facility] OR     TONSILLECTOMY Bilateral 10/02/2019    Procedure: Bilateral Tonsillectomy;  Surgeon: Farhana Guy MD;  Location: [facility] OR       FAMILY HISTORY: family history includes Asthma in her mother; Hypertension in her mother; Sickle Cell Trait in her father and mother.    HEALTH & LIFESTYLE PRACTICES:   Tobacco:  reports that she has never smoked. She has never used smokeless tobacco.  Alcohol:  reports previous alcohol use.  Illicit drugs:  reports no history of drug use.    LABORATORY VALUES:   Last Basic Metabolic Panel:  Lab Results   Component Value Date     11/13/2021     07/10/2021      Lab Results   Component Value Date    POTASSIUM 4.6 11/13/2021    POTASSIUM 3.7 11/11/2021    POTASSIUM 3.9 07/10/2021     Lab Results   Component Value Date    CHLORIDE 110 11/13/2021    CHLORIDE 112 07/10/2021     Lab Results   Component Value Date    MICAH 8.5 11/13/2021    MICAH 8.4  "07/10/2021     Lab Results   Component Value Date    CO2 23 11/13/2021    CO2 22 07/10/2021     Lab Results   Component Value Date    BUN 10 11/13/2021    BUN 9 07/10/2021     Lab Results   Component Value Date    CR 0.52 11/13/2021    CR 0.50 07/10/2021     Lab Results   Component Value Date     11/13/2021     07/10/2021       CBC results:  Lab Results   Component Value Date    WBC 12.1 11/13/2021    WBC 15.8 07/10/2021     Lab Results   Component Value Date    HGB 6.9 11/13/2021    HGB 7.8 07/10/2021     Lab Results   Component Value Date    HCT 19.0 11/13/2021    HCT 22.2 07/10/2021     Lab Results   Component Value Date     11/13/2021     07/10/2021       DIAGNOSTIC TESTS:   Labs above reviewed as well as additional relevant diagnostic studies from the EPIC record.     PHYSICAL EXAMINATION:  BP (!) 153/75 (BP Location: Left leg)   Pulse 95   Temp 97.1  F (36.2  C) (Oral)   Resp 18   Ht 1.626 m (5' 4\")   Wt 78.9 kg (173 lb 15.1 oz)   SpO2 97%   BMI 29.86 kg/m       EXAM:  General: NAD.  Neuro: A+O x3.            "

## 2021-11-15 NOTE — PLAN OF CARE
OT: CX: Pt reporting Ind with ADLs and did not want any IP OT intervention. Will complete IP OT order.

## 2021-11-15 NOTE — PROVIDER NOTIFICATION
"[place] [address] hospitalist Dr. Arroyo paged at #[phone] that pt is c/o 10/10 pain \"all over.\" She says her pain has been uncontrolled all night and the PO dilaudid has not been helping at all. Requested an order PRN IV dilaudid and requested that Dr. Arroyo come see her right away.  "

## 2021-11-15 NOTE — PROGRESS NOTES
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  Negative APS testing.   Had CXR showing infiltrates/cf infection on 11/12 and started on abx.  Pain is stable so no changes to pain plan today.  VQ scan with ?propagation of clot but just started on warfarin, APS testing negative.      Recommendations:   -Continue heparin bridge with warfarin until INR stable   -No changes to pain plan today  -IS at bedside, encouraged to use q hour    Patient was seen and plan of care was discussed with attending physician Dr. Villagran     We will continue to follow this patient. Please don't hesitate to contact the Fellow On-Call with questions.    Cherelle Brock PA-C  [facility] Hematology  [phone]    ___________________________________________________________________  Subjective & Interval History:    One o2 reading at 80% so VQ scan repeated showing ?propagation of clot but otherwise satting well on minimal o2.  Pain is stable     Current pain plan:  -Lidocaine infusion (written 11/14 but pt refused)   -MS contin 15 mg PO Q 12 hours   -dilaudid 6 mg PO q3 hours PRN (took 4x yesterday)  -dilaudid 0.5 mg IV q 8 hours PRN (added today, 11/15)     Physical Exam:    BP  "128/59 (BP Location: Left leg)   Pulse 97   Temp 98  F (36.7  C) (Oral)   Resp 16   Ht 1.626 m (5' 4\")   Wt 78.2 kg (172 lb 6.4 oz)   SpO2 97%   BMI 29.59 kg/m    Gen: Well appearing, in NAD.  Seen lying in hospital bed.   CV: Normal rate, regular rhythm. No m/r/g  Pulm: CTAB, no wheezing, normal work of breathing. On 2 L NC   Ext: Warm and well perfused. No lower extremity edema  Skin: No rash, cyanosis or petechial lesion  Neuro: Alert and answering questions appropriately.   Labs & Studies: I personally reviewed the following studies:  ROUTINE LABS (Last four results):  CMPRecent Labs   Lab 11/15/21  0603 11/14/21  0625 11/13/21  0524 11/12/21  0607 11/11/21  0955 11/11/21  0112 11/10/21  1943 11/10/21  1109   NA  --  138 139 139  --   --  136 138   POTASSIUM 3.6 4.0 4.6 3.6 3.7  3.7   < > 3.3* 3.8   CHLORIDE  --  110* 110* 110*  --   --  110* 112*   CO2  --  24 23 23  --   --  19* 19*   ANIONGAP  --  4 6 6  --   --  7 7   GLC  --  111* 102* 84  --   --  95 113*   BUN  --  9 10 6*  --   --  11 12   CR  --  0.54 0.52 0.62  --   --  0.49* 0.49*   GFRESTIMATED  --  >90 >90 >90  --   --  >90 >90   MICAH  --  8.8 8.5 8.4*  --   --  8.5 8.3*   MAG  --   --   --   --  2.0  --   --   --    PROTTOTAL  --   --   --   --   --   --  7.6 7.8   ALBUMIN  --   --   --   --   --   --  3.8 3.9   BILITOTAL  --   --   --   --   --   --  3.5* 3.8*   ALKPHOS  --   --   --   --   --   --  79 84   AST  --   --   --   --   --   --  112*  --    ALT  --   --   --   --   --   --  85* 94*    < > = values in this interval not displayed.     CBC  Recent Labs   Lab 11/15/21  0603 11/14/21  0625 11/14/21  0127 11/13/21  0524 11/12/21  0607   WBC 10.5 10.9  --  12.1* 10.8   RBC 2.32* 1.99*  --  2.09* 2.30*   HGB 7.3* 6.6* 6.3* 6.9* 7.7*   HCT 20.5* 17.6*  --  19.0* 21.2*   MCV 88 88  --  91 92   MCH 31.5 33.2*  --  33.0 33.5*   MCHC 35.6 37.5*  --  36.3 36.3   RDW 20.8* 22.5*  --  24.2* 24.3*    238  --  270 316     INR  Recent Labs "   Lab 11/15/21  0603 11/14/21  0625 11/13/21  0524 11/12/21  0607   INR 1.88* 1.64* 1.29* 1.29*       Medications list for reference:  Current Facility-Administered Medications   Medication     acetaminophen (TYLENOL) tablet 650 mg     acetaminophen (TYLENOL) tablet 975 mg     albuterol (PROAIR HFA/PROVENTIL HFA/VENTOLIN HFA) 108 (90 Base) MCG/ACT inhaler 2 puff     albuterol (PROVENTIL) neb solution 2.5 mg     ARIPiprazole (ABILIFY) tablet 2 mg     azithromycin (ZITHROMAX) 500 mg in sodium chloride 0.9 % 250 mL intermittent infusion     cefTRIAXone (ROCEPHIN) 1 g vial to attach to  mL bag for ADULTS or NS 50 mL bag for PEDS     deferasirox (JADENU) tablet 1,440 mg     diphenhydrAMINE (BENADRYL) capsule 25-50 mg     DULoxetine (CYMBALTA) DR capsule 30 mg     fluticasone-vilanterol (BREO ELLIPTA) 200-25 MCG/INH inhaler 1 puff     heparin infusion 25,000 units in D5W 250 mL ANTICOAGULANT     HYDROmorphone (DILAUDID) tablet 6 mg     HYDROmorphone (PF) (DILAUDID) injection 0.5 mg     hydroxyurea (HYDREA) capsule 2,000 mg     hydrOXYzine (ATARAX) tablet 25 mg     [Held by provider] ketamine (KETALAR) 2 mg/mL in sodium chloride 0.9 % 50 mL ANALGESIA infusion     lidocaine 1 % 0.1-1 mL     lidocaine 400 mg in D5W 50 mL (ADULT STD) - for ANALGESIA     melatonin tablet 3 mg     morphine (MS CONTIN) 12 hr tablet 15 mg     naloxone (NARCAN) injection 0.2 mg    Or     naloxone (NARCAN) injection 0.4 mg    Or     naloxone (NARCAN) injection 0.2 mg    Or     naloxone (NARCAN) injection 0.4 mg     ondansetron (ZOFRAN) tablet 8 mg     pantoprazole (PROTONIX) EC tablet 40 mg     Patient is already receiving anticoagulation with heparin, enoxaparin (LOVENOX), warfarin (COUMADIN)  or other anticoagulant medication     senna-docusate (SENOKOT-S/PERICOLACE) 8.6-50 MG per tablet 1 tablet    Or     senna-docusate (SENOKOT-S/PERICOLACE) 8.6-50 MG per tablet 2 tablet     sodium chloride (PF) 0.9% PF flush 3 mL     sodium chloride (PF)  0.9% PF flush 3 mL     sodium chloride 0.9% infusion     warfarin ANTICOAGULANT (COUMADIN) tablet 7.5 mg     Warfarin Therapy Reminder (Check START DATE - warfarin may be starting in the FUTURE)

## 2021-11-15 NOTE — PLAN OF CARE
[id number]    VSS on O2 3L via nasal cannula. Continuous pulse ox monitoring continued. Tachycardic. Denies SOB and nausea. C/o pain, gave dilaudid x1 with no relief. Refused scheduled Tylenol. Heparin drip running at 1150 units/hr. AM recheck was within goal range, no change to rate. Next recheck ordered for 1315. NS infusing at 50 mL/hr. UA still needed. Continue with plan of care.

## 2021-11-15 NOTE — PLAN OF CARE
"0925-4542: VSS, no c/o nausea or sob this shift. Pt endorses \"10/10\" generalized pain, PRN dilaudid given x2 w/o relief, provider notified and came to bedside, see note. Cont ketamine infusion stopped per pt request. 2 mL of ketamine was wasted, tag placed in pharmacy return bin. Cont Lidocaine ordered, pt did not want to start it as it \"has not worked before\".  Pt explained frustration regarding lack of pain control, stated \"I feel like I am not being heard by the team\", charge and RN discussed concerns w/night cover and agreed to pass along to days. Hep gtt increased to 1150 unit(s)/hr and 2350 bolus given x2 based on Xa result per protocol, recheck @ 0415. On cont pulse ox and 3L NC, satting mid 90's. Pt endorses poor appetite due to severe and unmanaged pain, UA still needed, continue w/plan of care.  "

## 2021-11-15 NOTE — PROVIDER NOTIFICATION
"Daya Michael MD paged     \"[id number] [id number] requested to be dc from their ketamine drip. thanks    #[phone]\"  "

## 2021-11-15 NOTE — PLAN OF CARE
[id number]: O2 weaned down to 2 L from 3 L with sats >95%. On continuous pulse ox. Intermittently tachycardic. OVSS. Received 0.5 mg IV dilaudid x2 for pain; appears significantly more comfortable since. Heparin gtt paused for 1 hour then reduced to 1050 units/hr for a Xa level of 0.81; next Xa check due at 2200. MIVF infusing at 50 mL/hr. Provider notified of critical result from NM perfusion lung scan (see Provider Notification note); no changes made. UA sent. Up with SBA. Ordered dinner from [place] this evening. Continue with POC.

## 2021-11-15 NOTE — PROVIDER NOTIFICATION
Gold 11 cross-cover Dr. Michael notified at #[phone] that lung scan from this afternoon is positive for acute on chronic right pulmonary embolism. Pt currently on heparin gtt at 1050 units/hr. Sats >95% on 2 L NC. Denies SOB, chest pain. Will continue to monitor.

## 2021-11-16 ENCOUNTER — APPOINTMENT (OUTPATIENT)
Dept: ULTRASOUND IMAGING | Facility: CLINIC | Age: 21
DRG: 175 | End: 2021-11-16
Attending: STUDENT IN AN ORGANIZED HEALTH CARE EDUCATION/TRAINING PROGRAM
Payer: COMMERCIAL

## 2021-11-16 LAB
ANION GAP SERPL CALCULATED.3IONS-SCNC: 5 MMOL/L (ref 3–14)
BUN SERPL-MCNC: 11 MG/DL (ref 7–30)
CALCIUM SERPL-MCNC: 8.2 MG/DL (ref 8.5–10.1)
CHLORIDE BLD-SCNC: 109 MMOL/L (ref 94–109)
CO2 SERPL-SCNC: 26 MMOL/L (ref 20–32)
CREAT SERPL-MCNC: 0.5 MG/DL (ref 0.52–1.04)
ERYTHROCYTE [DISTWIDTH] IN BLOOD BY AUTOMATED COUNT: 21 % (ref 10–15)
GFR SERPL CREATININE-BSD FRML MDRD: >90 ML/MIN/1.73M2
GLUCOSE BLD-MCNC: 101 MG/DL (ref 70–99)
HCT VFR BLD AUTO: 20.2 % (ref 35–47)
HGB BLD-MCNC: 7.4 G/DL (ref 11.7–15.7)
INR PPP: 2 (ref 0.85–1.15)
MCH RBC QN AUTO: 32 PG (ref 26.5–33)
MCHC RBC AUTO-ENTMCNC: 36.6 G/DL (ref 31.5–36.5)
MCV RBC AUTO: 87 FL (ref 78–100)
PLATELET # BLD AUTO: 352 10E3/UL (ref 150–450)
POTASSIUM BLD-SCNC: 3.7 MMOL/L (ref 3.4–5.3)
RBC # BLD AUTO: 2.31 10E6/UL (ref 3.8–5.2)
SODIUM SERPL-SCNC: 140 MMOL/L (ref 133–144)
UFH PPP CHRO-ACNC: 0.26 IU/ML
UFH PPP CHRO-ACNC: 0.46 IU/ML
UFH PPP CHRO-ACNC: 0.61 IU/ML
WBC # BLD AUTO: 9 10E3/UL (ref 4–11)

## 2021-11-16 PROCEDURE — 250N000011 HC RX IP 250 OP 636: Performed by: EMERGENCY MEDICINE

## 2021-11-16 PROCEDURE — 250N000013 HC RX MED GY IP 250 OP 250 PS 637: Performed by: INTERNAL MEDICINE

## 2021-11-16 PROCEDURE — 250N000013 HC RX MED GY IP 250 OP 250 PS 637

## 2021-11-16 PROCEDURE — 250N000013 HC RX MED GY IP 250 OP 250 PS 637: Performed by: STUDENT IN AN ORGANIZED HEALTH CARE EDUCATION/TRAINING PROGRAM

## 2021-11-16 PROCEDURE — 250N000011 HC RX IP 250 OP 636: Performed by: INTERNAL MEDICINE

## 2021-11-16 PROCEDURE — 250N000013 HC RX MED GY IP 250 OP 250 PS 637: Performed by: EMERGENCY MEDICINE

## 2021-11-16 PROCEDURE — 120N000002 HC R&B MED SURG/OB UMMC

## 2021-11-16 PROCEDURE — 85610 PROTHROMBIN TIME: CPT | Performed by: INTERNAL MEDICINE

## 2021-11-16 PROCEDURE — 258N000003 HC RX IP 258 OP 636: Performed by: EMERGENCY MEDICINE

## 2021-11-16 PROCEDURE — 85520 HEPARIN ASSAY: CPT | Performed by: INTERNAL MEDICINE

## 2021-11-16 PROCEDURE — 99233 SBSQ HOSP IP/OBS HIGH 50: CPT | Performed by: STUDENT IN AN ORGANIZED HEALTH CARE EDUCATION/TRAINING PROGRAM

## 2021-11-16 PROCEDURE — 85027 COMPLETE CBC AUTOMATED: CPT | Performed by: EMERGENCY MEDICINE

## 2021-11-16 PROCEDURE — 85520 HEPARIN ASSAY: CPT | Performed by: STUDENT IN AN ORGANIZED HEALTH CARE EDUCATION/TRAINING PROGRAM

## 2021-11-16 PROCEDURE — 999N000147 HC STATISTIC PT IP EVAL DEFER

## 2021-11-16 PROCEDURE — 99232 SBSQ HOSP IP/OBS MODERATE 35: CPT | Performed by: PHYSICIAN ASSISTANT

## 2021-11-16 PROCEDURE — 93971 EXTREMITY STUDY: CPT | Mod: 26 | Performed by: RADIOLOGY

## 2021-11-16 PROCEDURE — 93971 EXTREMITY STUDY: CPT | Mod: RT

## 2021-11-16 PROCEDURE — 36591 DRAW BLOOD OFF VENOUS DEVICE: CPT | Performed by: STUDENT IN AN ORGANIZED HEALTH CARE EDUCATION/TRAINING PROGRAM

## 2021-11-16 PROCEDURE — 36591 DRAW BLOOD OFF VENOUS DEVICE: CPT | Performed by: INTERNAL MEDICINE

## 2021-11-16 PROCEDURE — 80048 BASIC METABOLIC PNL TOTAL CA: CPT | Performed by: INTERNAL MEDICINE

## 2021-11-16 RX ORDER — CEFDINIR 300 MG/1
300 CAPSULE ORAL EVERY 12 HOURS SCHEDULED
Status: COMPLETED | OUTPATIENT
Start: 2021-11-16 | End: 2021-11-19

## 2021-11-16 RX ORDER — WARFARIN SODIUM 7.5 MG/1
7.5 TABLET ORAL
Status: COMPLETED | OUTPATIENT
Start: 2021-11-16 | End: 2021-11-16

## 2021-11-16 RX ADMIN — HYDROXYUREA 2000 MG: 500 CAPSULE ORAL at 09:39

## 2021-11-16 RX ADMIN — MORPHINE SULFATE 15 MG: 15 TABLET, EXTENDED RELEASE ORAL at 10:24

## 2021-11-16 RX ADMIN — DEFERASIROX 1440 MG: 360 TABLET ORAL at 19:20

## 2021-11-16 RX ADMIN — ACETAMINOPHEN 975 MG: 325 TABLET, FILM COATED ORAL at 16:54

## 2021-11-16 RX ADMIN — DULOXETINE 30 MG: 30 CAPSULE, DELAYED RELEASE ORAL at 19:20

## 2021-11-16 RX ADMIN — CEFTRIAXONE 1 G: 1 INJECTION, POWDER, FOR SOLUTION INTRAMUSCULAR; INTRAVENOUS at 11:19

## 2021-11-16 RX ADMIN — WARFARIN SODIUM 7.5 MG: 7.5 TABLET ORAL at 17:37

## 2021-11-16 RX ADMIN — HYDROMORPHONE HYDROCHLORIDE 6 MG: 4 TABLET ORAL at 13:39

## 2021-11-16 RX ADMIN — ARIPIPRAZOLE 2 MG: 2 TABLET ORAL at 10:24

## 2021-11-16 RX ADMIN — HYDROMORPHONE HYDROCHLORIDE 6 MG: 4 TABLET ORAL at 16:53

## 2021-11-16 RX ADMIN — PANTOPRAZOLE SODIUM 40 MG: 40 TABLET, DELAYED RELEASE ORAL at 09:37

## 2021-11-16 RX ADMIN — HYDROMORPHONE HYDROCHLORIDE 0.5 MG: 1 INJECTION, SOLUTION INTRAMUSCULAR; INTRAVENOUS; SUBCUTANEOUS at 11:13

## 2021-11-16 RX ADMIN — HYDROMORPHONE HYDROCHLORIDE 6 MG: 4 TABLET ORAL at 22:02

## 2021-11-16 RX ADMIN — CEFDINIR 300 MG: 300 CAPSULE ORAL at 19:20

## 2021-11-16 RX ADMIN — HYDROMORPHONE HYDROCHLORIDE 0.5 MG: 1 INJECTION, SOLUTION INTRAMUSCULAR; INTRAVENOUS; SUBCUTANEOUS at 19:20

## 2021-11-16 RX ADMIN — DULOXETINE 30 MG: 30 CAPSULE, DELAYED RELEASE ORAL at 09:37

## 2021-11-16 RX ADMIN — MORPHINE SULFATE 15 MG: 15 TABLET, EXTENDED RELEASE ORAL at 21:50

## 2021-11-16 RX ADMIN — ACETAMINOPHEN 975 MG: 325 TABLET, FILM COATED ORAL at 09:38

## 2021-11-16 RX ADMIN — HYDROMORPHONE HYDROCHLORIDE 0.5 MG: 1 INJECTION, SOLUTION INTRAMUSCULAR; INTRAVENOUS; SUBCUTANEOUS at 02:54

## 2021-11-16 RX ADMIN — HEPARIN SODIUM 1500 UNITS/HR: 1000 INJECTION INTRAVENOUS; SUBCUTANEOUS at 09:17

## 2021-11-16 ASSESSMENT — ACTIVITIES OF DAILY LIVING (ADL)
ADLS_ACUITY_SCORE: 7

## 2021-11-16 ASSESSMENT — MIFFLIN-ST. JEOR: SCORE: 1510

## 2021-11-16 NOTE — PLAN OF CARE
A&Ox4. Tachycardic at baseline in 100s, . Sating > 94% on 2L NC. OVSS. C/o back pain. On scheduled tylenol and morphine. prn 0.5 mg prn IV Dilaudid given x1, available Q3hrs. 6mg PO prn Dilaudid given x to help tolerate pain. Denies N/V and SOB. Hep Xa infusing at 1500 units/hr. Last Hep Xa in therapeutic range, 0.46. Recheck ordered for 2100. INR 2.0 in therapeutic range, look to bridge to warfarin. R extreity ultrasound ordered to rule out DVT for R forearm swelling and bruising. NS infusing 50 mL/hr through PORT. Up  Walking halls. Continue with POC. Discharge tomorrow pending pain control and adequate PE management.

## 2021-11-16 NOTE — PLAN OF CARE
7D PT: Defer. Per discussion with pt, no acute PT needs. Pt reports no concerns with mobility, balance, or access to home, declines PT needs. PT to complete orders, please re-initiate if new needs arise.

## 2021-11-16 NOTE — PLAN OF CARE
"  Problem: Adult Inpatient Plan of Care  Goal: Optimal Comfort and Wellbeing  Outcome: Improving     Problem: Pain Acute  Goal: Acceptable Pain Control and Functional Ability  Outcome: Improving     Pt was given IV dilaudid for pain. Assist of one for cares. Call light appropriate. /49 (BP Location: Left arm)   Pulse 96   Temp 97.2  F (36.2  C) (Temporal)   Resp 18   Ht 1.626 m (5' 4\")   Wt 78.2 kg (172 lb 6.4 oz)   SpO2 96%. Heparin infusing at 1350 unit/hr. Recheck this morning. Will monitor.   "

## 2021-11-16 NOTE — PROGRESS NOTES
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 Hematology recommends coumadin with INR goal 2-3 and bridging with heparin and have ordered APS workup which is pnding. Pain meds being adjusted for diffuse body pain. No evidence of acute chest. Abn CXR and drop in hgb 11/13 d/w hematology. For now on Rocephin and zithromax and monitoring hgb. VQ repeated today due to concern for ongoing hypoxia.    Today:  - Ketamine drip stopped and lidocaine drip never started as patient does not think these help with pain control. Dilaudid oral 6mg q 3 hrs not adequate for pain control. IV dilaudid started at 0.5 q 8h per patient request.   - Continue Rocephin and zithromax for infiltrate on CXR  - Monitor hgb, no transfusion today  - Monitor WBC count  - follow blood cultures/Urine Culture  - Anticardiolipin antibodies pending  - VQ scan ordered for hypoxia       #Acute hypoxic respiratory failure  #Recurrent Pulm Embolism  Diagnosed with  PE On 2/1/21 started on rivaroxaban  Switched to apixaban on  3/25/21 with RUE DVT. Developed worsening of PE requiring admission from 4/26/21-5/11/21  in setting of low Apixaban levels. Admitted again From  7/13/21-7/25/21 for acute on chronic PE, switched to dabigitran + ASA.  Claims that she is taking  the medicines properly at this time.  Presented with worsening of SOB  of 1 month.  No chest pain. NM perfusion scan on 11/10/21 showed  at least 3 new perfusion defects In the background of chronic perfusion defects which are suspicion for acute on chronic PE. These developed in the setting of dabigatran and ASA which is concerning for anticoagulation failure. Appreciate hematology input. Start coumadin and bridge with high intensity heparin. APS workup ordered by hematology.     #Left arm pain, diffuse joint pain all over body, no evidence of acute chest syndrome, Acute pain crisis with known h/o Sickle Cell Disease:   Hgb at baseline, CXR w infiltrates  Plan:      Continue PTA MS contin 15mg CR BID    Switch from Oxycodone 20mg PO  Q4 hours PRN to dilaudid oral 4-6 mg q3h PRN if no relief with ketamine 6 mg/hr    No PCA for now    No IV narcotics    Zofran PRN    Senna    Trend hemoglobin, LDH, reticulocyte count     #sickle cell anemia  #Iron overload 2/2blood transfusion    Continue PTA Hydrea 2000mg/daily    Continue PTA [id number]/day     #Bronchial asthma    Continue PTA  albuterol    Continue PTA Symbicort     #Depression,    Continue PTA duloxetine 30mg/day and abilify     #Hx of CVA                  Diet: Combination Diet Regular Diet Adult    DVT Prophylaxis: Warfarin  Lopez Catheter: Not present  Central Lines: None  Code Status: Full Code      Disposition Plan   Expected discharge: 11/17/2021   recommended to prior living arrangement once adequate management of acute PE and adequate pain control on oral meds.     The patient's care was discussed with the Care Coordinator/, Patient and hematology and pain Consultant.    Terese Arroyo MD  Hospitalist Service, [address]  Securely message with the Vocera Web Console (learn more here)  Text page via [employer] Paging/Directory    Please see sign in/sign out for up to date coverage  information    Clinically Significant Risk Factors Present on Admission                ______________________________________________________________________    Interval History   Patient resting comfortably. Ketamine and lidocaine not working for pain control. Wants IV dilaudid. Oral dilaudid not provide pain relief. ROS neg    Data reviewed today: I reviewed all medications, new labs and imaging results over the last 24 hours.     Physical Exam   Vital Signs: Temp: 97.4  F (36.3  C) Temp src: Temporal BP: 121/59 Pulse: 97   Resp: 20 SpO2: 97 % O2 Device: Nasal cannula Oxygen Delivery: 2 LPM  Weight: 172 lbs 6.4 oz  General Appearance: Well built, old right hemiparesis with flexion contracture at right wrist  Respiratory: CTA b/l comfortable at rest not in any acute cardio pulm distress  Cardiovascular: S1S2 normal RRR  GI: soft NT  Skin: NAD  Other: aaox3 moving all 4 ext spont old right hemiparesis speech intact     Data   Recent Labs   Lab 11/15/21  0603 11/14/21  0625 11/14/21  0127 11/13/21  0524 11/12/21  0607 11/11/21  0112 11/10/21  1943 11/10/21  1109   WBC 10.5 10.9  --  12.1* 10.8  --  13.9* 13.4*   HGB 7.3* 6.6* 6.3* 6.9* 7.7*  --  7.7* 8.0*   MCV 88 88  --  91 92  --  91 89    238  --  270 316  --  281 286   INR 1.88* 1.64*  --  1.29* 1.29*   < > 1.23*  --    NA  --  138  --  139 139  --  136 138   POTASSIUM 3.6 4.0  --  4.6 3.6   < > 3.3* 3.8   CHLORIDE  --  110*  --  110* 110*  --  110* 112*   CO2  --  24  --  23 23  --  19* 19*   BUN  --  9  --  10 6*  --  11 12   CR  --  0.54  --  0.52 0.62  --  0.49* 0.49*   ANIONGAP  --  4  --  6 6  --  7 7   MICAH  --  8.8  --  8.5 8.4*  --  8.5 8.3*   GLC  --  111*  --  102* 84  --  95 113*   ALBUMIN  --   --   --   --   --   --  3.8 3.9   PROTTOTAL  --   --   --   --   --   --  7.6 7.8   BILITOTAL  --   --   --   --   --   --  3.5* 3.8*   ALKPHOS  --   --   --   --   --   --  79 84   ALT  --   --   --   --   --   --  85* 94*   AST  --   --   --   --   --    --  112*  --    TROPONIN  --   --   --   --   --   --  <0.015 <0.015    < > = values in this interval not displayed.     Recent Results (from the past 24 hour(s))   NM Lung Scan Perfusion Particulate   Result Value    Radiologist flags Acute on chronic right pulmonary embolism. (Urgent)    Narrative    Examination: NM LUNG SCAN PERFUSION PARTICULATE       Date: 11/15/2021 3:47 PM     Indication: PE suspected, high prob; no     Comparison: Perfusion study 11/10/2021, 8/7/2021, 7/13/2021.    Additional Information: none    Technique:    The patient received 7 mCi of Tc-99m labeled MAA intravenously.  Ventilation imaging was deferred as per department policy precautions  during COVID-19 pandemic. A standard eight view lung perfusion scan  was obtained. SPECT images of the lungs were obtained. CT images of  the chest were obtained for the purposes of anatomic localization and  attenuation correction only. Fused SPECT-CT processing was performed  in the [employer] workstation, archived in PACS, and reviewed by the  radiologist.    Findings:    Planar images demonstrate new perfusion defect in the right lower  lateral segment. Similar anterior lateral left inferior perfusion  defect compared to prior.    Fused SPECT-CT images demonstrate no pleural effusion, pneumothorax,  for consolidation. Left chest wall Port-A-Cath with tip terminating in  the high right atrium. A substantial change in upper abdominal  lymphadenopathy. Surgically absent gallbladder.      Impression    Impression:  New right lower lateral segment perfusion defect. Multiple bilateral  chronic perfusion defects.    [Urgent Result: Acute on chronic right pulmonary embolism.]    Finding was identified on 11/15/2021 4:20 PM.     Dr. Gomez  was contacted by Dr. Cooper at 11/15/2021 4:37 PM and  verbalized understanding of the urgent finding.      Medications     heparin 1,050 Units/hr (11/15/21 8636)     [Held by provider] ketamine 2 mg/mL ADULT Stopped  (11/14/21 1911)     lidocaine (for analgesia)       - MEDICATION INSTRUCTIONS -       sodium chloride 50 mL/hr at 11/15/21 0952     Warfarin Therapy Reminder         acetaminophen  975 mg Oral Q8H     ARIPiprazole  2 mg Oral Daily     azithromycin  500 mg Intravenous Q24H     cefTRIAXone  1 g Intravenous Q24H     deferasirox  1,440 mg Oral QPM     DULoxetine  30 mg Oral BID     fluticasone-vilanterol  1 puff Inhalation Daily     hydroxyurea  2,000 mg Oral Daily     morphine  15 mg Oral Q12H     pantoprazole  40 mg Oral QAM AC     sodium chloride (PF)  3 mL Intracatheter Q8H

## 2021-11-17 ENCOUNTER — APPOINTMENT (OUTPATIENT)
Dept: GENERAL RADIOLOGY | Facility: CLINIC | Age: 21
DRG: 175 | End: 2021-11-17
Attending: STUDENT IN AN ORGANIZED HEALTH CARE EDUCATION/TRAINING PROGRAM
Payer: COMMERCIAL

## 2021-11-17 LAB
BACTERIA UR CULT: ABNORMAL
BACTERIA UR CULT: ABNORMAL
ERYTHROCYTE [DISTWIDTH] IN BLOOD BY AUTOMATED COUNT: 21.2 % (ref 10–15)
HCT VFR BLD AUTO: 19.6 % (ref 35–47)
HGB BLD-MCNC: 7.2 G/DL (ref 11.7–15.7)
INR PPP: 2.26 (ref 0.85–1.15)
MCH RBC QN AUTO: 32.1 PG (ref 26.5–33)
MCHC RBC AUTO-ENTMCNC: 36.7 G/DL (ref 31.5–36.5)
MCV RBC AUTO: 88 FL (ref 78–100)
PLATELET # BLD AUTO: 344 10E3/UL (ref 150–450)
POTASSIUM BLD-SCNC: 3.6 MMOL/L (ref 3.4–5.3)
RBC # BLD AUTO: 2.24 10E6/UL (ref 3.8–5.2)
UFH PPP CHRO-ACNC: 0.56 IU/ML
WBC # BLD AUTO: 9.2 10E3/UL (ref 4–11)

## 2021-11-17 PROCEDURE — 250N000011 HC RX IP 250 OP 636: Performed by: EMERGENCY MEDICINE

## 2021-11-17 PROCEDURE — 99233 SBSQ HOSP IP/OBS HIGH 50: CPT | Performed by: STUDENT IN AN ORGANIZED HEALTH CARE EDUCATION/TRAINING PROGRAM

## 2021-11-17 PROCEDURE — 250N000013 HC RX MED GY IP 250 OP 250 PS 637: Performed by: INTERNAL MEDICINE

## 2021-11-17 PROCEDURE — 120N000002 HC R&B MED SURG/OB UMMC

## 2021-11-17 PROCEDURE — 71045 X-RAY EXAM CHEST 1 VIEW: CPT | Mod: 26 | Performed by: RADIOLOGY

## 2021-11-17 PROCEDURE — 250N000013 HC RX MED GY IP 250 OP 250 PS 637: Performed by: STUDENT IN AN ORGANIZED HEALTH CARE EDUCATION/TRAINING PROGRAM

## 2021-11-17 PROCEDURE — 36591 DRAW BLOOD OFF VENOUS DEVICE: CPT | Performed by: STUDENT IN AN ORGANIZED HEALTH CARE EDUCATION/TRAINING PROGRAM

## 2021-11-17 PROCEDURE — 258N000003 HC RX IP 258 OP 636: Performed by: EMERGENCY MEDICINE

## 2021-11-17 PROCEDURE — 250N000011 HC RX IP 250 OP 636: Performed by: INTERNAL MEDICINE

## 2021-11-17 PROCEDURE — 85520 HEPARIN ASSAY: CPT | Performed by: STUDENT IN AN ORGANIZED HEALTH CARE EDUCATION/TRAINING PROGRAM

## 2021-11-17 PROCEDURE — 99232 SBSQ HOSP IP/OBS MODERATE 35: CPT | Performed by: PHYSICIAN ASSISTANT

## 2021-11-17 PROCEDURE — 71045 X-RAY EXAM CHEST 1 VIEW: CPT

## 2021-11-17 PROCEDURE — 85610 PROTHROMBIN TIME: CPT | Performed by: INTERNAL MEDICINE

## 2021-11-17 PROCEDURE — 250N000011 HC RX IP 250 OP 636: Performed by: STUDENT IN AN ORGANIZED HEALTH CARE EDUCATION/TRAINING PROGRAM

## 2021-11-17 PROCEDURE — 85027 COMPLETE CBC AUTOMATED: CPT | Performed by: STUDENT IN AN ORGANIZED HEALTH CARE EDUCATION/TRAINING PROGRAM

## 2021-11-17 PROCEDURE — 250N000013 HC RX MED GY IP 250 OP 250 PS 637

## 2021-11-17 PROCEDURE — 258N000003 HC RX IP 258 OP 636: Performed by: INTERNAL MEDICINE

## 2021-11-17 PROCEDURE — 84132 ASSAY OF SERUM POTASSIUM: CPT | Performed by: STUDENT IN AN ORGANIZED HEALTH CARE EDUCATION/TRAINING PROGRAM

## 2021-11-17 PROCEDURE — 250N000013 HC RX MED GY IP 250 OP 250 PS 637: Performed by: EMERGENCY MEDICINE

## 2021-11-17 RX ORDER — WARFARIN SODIUM 7.5 MG/1
7.5 TABLET ORAL
Status: COMPLETED | OUTPATIENT
Start: 2021-11-17 | End: 2021-11-17

## 2021-11-17 RX ORDER — HEPARIN SODIUM,PORCINE 10 UNIT/ML
5-10 VIAL (ML) INTRAVENOUS EVERY 24 HOURS
Status: DISCONTINUED | OUTPATIENT
Start: 2021-11-17 | End: 2021-11-21 | Stop reason: HOSPADM

## 2021-11-17 RX ORDER — HEPARIN SODIUM (PORCINE) LOCK FLUSH IV SOLN 100 UNIT/ML 100 UNIT/ML
5-10 SOLUTION INTRAVENOUS
Status: DISCONTINUED | OUTPATIENT
Start: 2021-11-17 | End: 2021-11-21 | Stop reason: HOSPADM

## 2021-11-17 RX ADMIN — ACETAMINOPHEN 975 MG: 325 TABLET, FILM COATED ORAL at 08:56

## 2021-11-17 RX ADMIN — HYDROMORPHONE HYDROCHLORIDE 6 MG: 4 TABLET ORAL at 17:19

## 2021-11-17 RX ADMIN — HYDROMORPHONE HYDROCHLORIDE 0.5 MG: 1 INJECTION, SOLUTION INTRAMUSCULAR; INTRAVENOUS; SUBCUTANEOUS at 05:24

## 2021-11-17 RX ADMIN — SODIUM CHLORIDE, PRESERVATIVE FREE 5 ML: 5 INJECTION INTRAVENOUS at 11:40

## 2021-11-17 RX ADMIN — SODIUM CHLORIDE, PRESERVATIVE FREE 5 ML: 5 INJECTION INTRAVENOUS at 14:41

## 2021-11-17 RX ADMIN — DULOXETINE 30 MG: 30 CAPSULE, DELAYED RELEASE ORAL at 20:28

## 2021-11-17 RX ADMIN — CEFDINIR 300 MG: 300 CAPSULE ORAL at 08:56

## 2021-11-17 RX ADMIN — ACETAMINOPHEN 975 MG: 325 TABLET, FILM COATED ORAL at 17:19

## 2021-11-17 RX ADMIN — PANTOPRAZOLE SODIUM 40 MG: 40 TABLET, DELAYED RELEASE ORAL at 08:56

## 2021-11-17 RX ADMIN — DEFERASIROX 1440 MG: 360 TABLET ORAL at 20:29

## 2021-11-17 RX ADMIN — HYDROMORPHONE HYDROCHLORIDE 0.5 MG: 1 INJECTION, SOLUTION INTRAMUSCULAR; INTRAVENOUS; SUBCUTANEOUS at 14:40

## 2021-11-17 RX ADMIN — HYDROXYUREA 2000 MG: 500 CAPSULE ORAL at 08:55

## 2021-11-17 RX ADMIN — SODIUM CHLORIDE: 9 INJECTION, SOLUTION INTRAVENOUS at 06:27

## 2021-11-17 RX ADMIN — ARIPIPRAZOLE 2 MG: 2 TABLET ORAL at 08:56

## 2021-11-17 RX ADMIN — MORPHINE SULFATE 15 MG: 15 TABLET, EXTENDED RELEASE ORAL at 22:39

## 2021-11-17 RX ADMIN — HYDROMORPHONE HYDROCHLORIDE 6 MG: 4 TABLET ORAL at 20:28

## 2021-11-17 RX ADMIN — HYDROMORPHONE HYDROCHLORIDE 6 MG: 4 TABLET ORAL at 08:56

## 2021-11-17 RX ADMIN — DULOXETINE 30 MG: 30 CAPSULE, DELAYED RELEASE ORAL at 08:56

## 2021-11-17 RX ADMIN — MORPHINE SULFATE 15 MG: 15 TABLET, EXTENDED RELEASE ORAL at 10:03

## 2021-11-17 RX ADMIN — HEPARIN SODIUM 1500 UNITS/HR: 1000 INJECTION INTRAVENOUS; SUBCUTANEOUS at 02:54

## 2021-11-17 RX ADMIN — CEFDINIR 300 MG: 300 CAPSULE ORAL at 20:29

## 2021-11-17 RX ADMIN — WARFARIN SODIUM 7.5 MG: 7.5 TABLET ORAL at 17:19

## 2021-11-17 RX ADMIN — HYDROMORPHONE HYDROCHLORIDE 6 MG: 4 TABLET ORAL at 13:29

## 2021-11-17 ASSESSMENT — ACTIVITIES OF DAILY LIVING (ADL)
ADLS_ACUITY_SCORE: 7
ADLS_ACUITY_SCORE: 8
ADLS_ACUITY_SCORE: 7
ADLS_ACUITY_SCORE: 7
ADLS_ACUITY_SCORE: 8
ADLS_ACUITY_SCORE: 7
ADLS_ACUITY_SCORE: 8
ADLS_ACUITY_SCORE: 8
ADLS_ACUITY_SCORE: 7
ADLS_ACUITY_SCORE: 8
ADLS_ACUITY_SCORE: 7

## 2021-11-17 NOTE — PLAN OF CARE
[id number]  Alert and oriented. VSS, pt O2 was consistently dropping to the mid 80's on 2L NC. Switched to 4L with oxymask. Has been maintaining in the low 90's. PRN dilaudid given. Hep gtt continues. Anti-xa recheck due with morning labs. No acute events, continue with POC.

## 2021-11-17 NOTE — PLAN OF CARE
[id number]: VSS, on cont ox, satting mid 90's on 2L NC. PO dilaudid given x2 and IV dilaudid x1 for back pain w/relief. Denies n/v or sob, ambulated in halls twice. Hep xa level @ 0.61, 2nd consecutive level within range, recheck in AM, heparin gtt running @ 1500 unit(s)/hr per protocol. PO warfarin given, INR @ 2.0. R forearm ultrasound for swelling complete, see results. NS @ 50mL/hr. Pt up ad janett, continue w/plan of care.

## 2021-11-17 NOTE — PROGRESS NOTES
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  Negative APS testing.   Had CXR showing infiltrates/cf infection on 11/12 and started on abx.  Pain is stable (same as pain level when she is at home) but requiring more o2 overnight (4L) now back on 2L.  Repeat CXR shows no consolidation and less edema/atelectasis, no signs of volume overload on exam so will recommend a cardiology consult to evaluate for CTEPH.  She may need to also go home on a small amount of oxygen.     Recommendations:   -discontinue heparin drip   -Will consult cardiology due to concern for CTEPH   -No changes to pain plan today.     Plan of care was discussed with attending physician Dr. Hernandez but pt seen and examined only by me     We will continue to follow this patient. Please don't hesitate to contact the Fellow On-Call with questions.    Cherelle Brock PA-C  [facility]  [phone]    ___________________________________________________________________  Subjective & Interval History:    No acute events noted overnight.  Still requiring O2 and required more last night (4L).   She reports pain is stable.  She currently rates pain as a 9/10 and states at home her pain is  "8-9/10 chronically.  She denies any cough or SOB.  No edema.      Current pain plan:  -Lidocaine infusion (written 11/14 but pt refused)   -MS contin 15 mg PO Q 12 hours   -dilaudid 6 mg PO q3 hours PRN (took 4x yesterday)  -dilaudid 0.5 mg IV q 8 hours PRN (added today, 11/15)     Physical Exam:    /71 (BP Location: Left arm)   Pulse 83   Temp 98.2  F (36.8  C) (Oral)   Resp 18   Ht 1.626 m (5' 4\")   Wt 76.5 kg (168 lb 10.4 oz)   SpO2 99%   BMI 28.95 kg/m    Gen: Well appearing, in NAD.  Seen lying in hospital bed.   CV: Normal rate, regular rhythm. No m/r/g  Pulm: CTAB, no wheezing, normal work of breathing. On RA, O2 85%, on 2L 93%  Ext: Warm and well perfused. No lower extremity edema  Skin: No rash, cyanosis or petechial lesion  Neuro: Alert and answering questions appropriately.     Labs & Studies: I personally reviewed the following studies:  ROUTINE LABS (Last four results):  CMP  Recent Labs   Lab 11/17/21  0609 11/16/21  0650 11/15/21  0603 11/14/21  0625 11/13/21  0524 11/12/21  0607 11/11/21  0955 11/11/21  0112 11/10/21  1943   NA  --  140  --  138 139 139  --   --  136   POTASSIUM 3.6 3.7 3.6 4.0 4.6 3.6 3.7  3.7   < > 3.3*   CHLORIDE  --  109  --  110* 110* 110*  --   --  110*   CO2  --  26  --  24 23 23  --   --  19*   ANIONGAP  --  5  --  4 6 6  --   --  7   GLC  --  101*  --  111* 102* 84  --   --  95   BUN  --  11  --  9 10 6*  --   --  11   CR  --  0.50*  --  0.54 0.52 0.62  --   --  0.49*   GFRESTIMATED  --  >90  --  >90 >90 >90  --   --  >90   MICAH  --  8.2*  --  8.8 8.5 8.4*  --   --  8.5   MAG  --   --   --   --   --   --  2.0  --   --    PROTTOTAL  --   --   --   --   --   --   --   --  7.6   ALBUMIN  --   --   --   --   --   --   --   --  3.8   BILITOTAL  --   --   --   --   --   --   --   --  3.5*   ALKPHOS  --   --   --   --   --   --   --   --  79   AST  --   --   --   --   --   --   --   --  112*   ALT  --   --   --   --   --   --   --   --  85*    < > = values in this " interval not displayed.     CBC  Recent Labs   Lab 11/17/21  0609 11/16/21  0650 11/15/21  0603 11/14/21  0625   WBC 9.2 9.0 10.5 10.9   RBC 2.24* 2.31* 2.32* 1.99*   HGB 7.2* 7.4* 7.3* 6.6*   HCT 19.6* 20.2* 20.5* 17.6*   MCV 88 87 88 88   MCH 32.1 32.0 31.5 33.2*   MCHC 36.7* 36.6* 35.6 37.5*   RDW 21.2* 21.0* 20.8* 22.5*    352 322 238     INR  Recent Labs   Lab 11/17/21  0609 11/16/21  0650 11/15/21  0603 11/14/21  0625   INR 2.26* 2.00* 1.88* 1.64*       Medications list for reference:  Current Facility-Administered Medications   Medication     acetaminophen (TYLENOL) tablet 650 mg     acetaminophen (TYLENOL) tablet 975 mg     albuterol (PROAIR HFA/PROVENTIL HFA/VENTOLIN HFA) 108 (90 Base) MCG/ACT inhaler 2 puff     albuterol (PROVENTIL) neb solution 2.5 mg     ARIPiprazole (ABILIFY) tablet 2 mg     cefdinir (OMNICEF) capsule 300 mg     deferasirox (JADENU) tablet 1,440 mg     diphenhydrAMINE (BENADRYL) capsule 25-50 mg     DULoxetine (CYMBALTA) DR capsule 30 mg     fluticasone-vilanterol (BREO ELLIPTA) 200-25 MCG/INH inhaler 1 puff     heparin 100 UNIT/ML injection 5-10 mL     heparin lock flush 10 UNIT/ML injection 5-10 mL     HYDROmorphone (DILAUDID) tablet 6 mg     HYDROmorphone (PF) (DILAUDID) injection 0.5 mg     hydroxyurea (HYDREA) capsule 2,000 mg     hydrOXYzine (ATARAX) tablet 25 mg     lidocaine 1 % 0.1-1 mL     melatonin tablet 3 mg     morphine (MS CONTIN) 12 hr tablet 15 mg     naloxone (NARCAN) injection 0.2 mg    Or     naloxone (NARCAN) injection 0.4 mg    Or     naloxone (NARCAN) injection 0.2 mg    Or     naloxone (NARCAN) injection 0.4 mg     ondansetron (ZOFRAN) tablet 8 mg     pantoprazole (PROTONIX) EC tablet 40 mg     Patient is already receiving anticoagulation with heparin, enoxaparin (LOVENOX), warfarin (COUMADIN)  or other anticoagulant medication     senna-docusate (SENOKOT-S/PERICOLACE) 8.6-50 MG per tablet 1 tablet    Or     senna-docusate (SENOKOT-S/PERICOLACE) 8.6-50 MG  per tablet 2 tablet     sodium chloride (PF) 0.9% PF flush 10-20 mL     sodium chloride (PF) 0.9% PF flush 3 mL     sodium chloride (PF) 0.9% PF flush 3 mL     warfarin ANTICOAGULANT (COUMADIN) tablet 7.5 mg     Warfarin Therapy Reminder (Check START DATE - warfarin may be starting in the FUTURE)

## 2021-11-17 NOTE — PROGRESS NOTES
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  Negative APS testing.   Had CXR showing infiltrates/cf infection on 11/12 and started on abx.  Pain is stable (same as pain level when she is at home) and she has less O2 requirement (seems to need it only at night) so should be able to discharge in the next few days.    Recommendations:   -Continue heparin until INR therapeutic x 1 more day  -No changes to pain plan today, pt thinks she should be ready to discharge tomorrow.     Plan of care was discussed with attending physician Dr. Villagran but pt seen and examined only by me     We will continue to follow this patient. Please don't hesitate to contact the Fellow On-Call with questions.    Cherelle Brock PA-C  [facility]  [phone]    ___________________________________________________________________  Subjective & Interval History:    No acute events noted overnight.  Still requiring O2 but only at night.   She reports pain is stable.  She currently rates pain as a 9/10 and states at home her pain is 8-9/10 chronically.  She is feeling almost ready for discharge.      Current pain plan:  -Lidocaine infusion (written 11/14 but pt refused)   -MS  "contin 15 mg PO Q 12 hours   -dilaudid 6 mg PO q3 hours PRN (took 4x yesterday)  -dilaudid 0.5 mg IV q 8 hours PRN (added today, 11/15)     Physical Exam:    BP (!) 147/79 (BP Location: Left arm)   Pulse 117   Temp 99.9  F (37.7  C) (Oral)   Resp 18   Ht 1.626 m (5' 4\")   Wt 76.5 kg (168 lb 10.4 oz)   SpO2 93%   BMI 28.95 kg/m    Gen: Well appearing, in NAD.  Seen lying in hospital bed.   CV: Normal rate, regular rhythm. No m/r/g  Pulm: CTAB, no wheezing, normal work of breathing. On RA, o2 sat 92%  Ext: Warm and well perfused. No lower extremity edema  Skin: No rash, cyanosis or petechial lesion  Neuro: Alert and answering questions appropriately.   Labs & Studies: I personally reviewed the following studies:  ROUTINE LABS (Last four results):  CMP  Recent Labs   Lab 11/16/21  0650 11/15/21  0603 11/14/21  0625 11/13/21  0524 11/12/21  0607 11/11/21  0955 11/11/21  0112 11/10/21  1943 11/10/21  1109     --  138 139 139  --   --  136 138   POTASSIUM 3.7 3.6 4.0 4.6 3.6 3.7  3.7   < > 3.3* 3.8   CHLORIDE 109  --  110* 110* 110*  --   --  110* 112*   CO2 26  --  24 23 23  --   --  19* 19*   ANIONGAP 5  --  4 6 6  --   --  7 7   *  --  111* 102* 84  --   --  95 113*   BUN 11  --  9 10 6*  --   --  11 12   CR 0.50*  --  0.54 0.52 0.62  --   --  0.49* 0.49*   GFRESTIMATED >90  --  >90 >90 >90  --   --  >90 >90   MICAH 8.2*  --  8.8 8.5 8.4*  --   --  8.5 8.3*   MAG  --   --   --   --   --  2.0  --   --   --    PROTTOTAL  --   --   --   --   --   --   --  7.6 7.8   ALBUMIN  --   --   --   --   --   --   --  3.8 3.9   BILITOTAL  --   --   --   --   --   --   --  3.5* 3.8*   ALKPHOS  --   --   --   --   --   --   --  79 84   AST  --   --   --   --   --   --   --  112*  --    ALT  --   --   --   --   --   --   --  85* 94*    < > = values in this interval not displayed.     CBC  Recent Labs   Lab 11/16/21  0650 11/15/21  0603 11/14/21  0625 11/14/21  0127 11/13/21  0524   WBC 9.0 10.5 10.9  --  12.1*   RBC " 2.31* 2.32* 1.99*  --  2.09*   HGB 7.4* 7.3* 6.6* 6.3* 6.9*   HCT 20.2* 20.5* 17.6*  --  19.0*   MCV 87 88 88  --  91   MCH 32.0 31.5 33.2*  --  33.0   MCHC 36.6* 35.6 37.5*  --  36.3   RDW 21.0* 20.8* 22.5*  --  24.2*    322 238  --  270     INR  Recent Labs   Lab 11/16/21  0650 11/15/21  0603 11/14/21  0625 11/13/21  0524   INR 2.00* 1.88* 1.64* 1.29*       Medications list for reference:  Current Facility-Administered Medications   Medication     acetaminophen (TYLENOL) tablet 650 mg     acetaminophen (TYLENOL) tablet 975 mg     albuterol (PROAIR HFA/PROVENTIL HFA/VENTOLIN HFA) 108 (90 Base) MCG/ACT inhaler 2 puff     albuterol (PROVENTIL) neb solution 2.5 mg     ARIPiprazole (ABILIFY) tablet 2 mg     cefdinir (OMNICEF) capsule 300 mg     deferasirox (JADENU) tablet 1,440 mg     diphenhydrAMINE (BENADRYL) capsule 25-50 mg     DULoxetine (CYMBALTA) DR capsule 30 mg     fluticasone-vilanterol (BREO ELLIPTA) 200-25 MCG/INH inhaler 1 puff     heparin infusion 25,000 units in D5W 250 mL ANTICOAGULANT     HYDROmorphone (DILAUDID) tablet 6 mg     HYDROmorphone (PF) (DILAUDID) injection 0.5 mg     hydroxyurea (HYDREA) capsule 2,000 mg     hydrOXYzine (ATARAX) tablet 25 mg     [Held by provider] ketamine (KETALAR) 2 mg/mL in sodium chloride 0.9 % 50 mL ANALGESIA infusion     lidocaine 1 % 0.1-1 mL     melatonin tablet 3 mg     morphine (MS CONTIN) 12 hr tablet 15 mg     naloxone (NARCAN) injection 0.2 mg    Or     naloxone (NARCAN) injection 0.4 mg    Or     naloxone (NARCAN) injection 0.2 mg    Or     naloxone (NARCAN) injection 0.4 mg     ondansetron (ZOFRAN) tablet 8 mg     pantoprazole (PROTONIX) EC tablet 40 mg     Patient is already receiving anticoagulation with heparin, enoxaparin (LOVENOX), warfarin (COUMADIN)  or other anticoagulant medication     senna-docusate (SENOKOT-S/PERICOLACE) 8.6-50 MG per tablet 1 tablet    Or     senna-docusate (SENOKOT-S/PERICOLACE) 8.6-50 MG per tablet 2 tablet     sodium  chloride (PF) 0.9% PF flush 3 mL     sodium chloride (PF) 0.9% PF flush 3 mL     sodium chloride 0.9% infusion     Warfarin Therapy Reminder (Check START DATE - warfarin may be starting in the FUTURE)

## 2021-11-17 NOTE — PROGRESS NOTES
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         # Acute hypoxic respiratory failure, resolved  # Acute on chronic recurrent pulmonary embolism  Diagnosed with PE on 2/1/21 started on rivaroxaban . Switched to apixaban on  3/25/21 with RUE DVT. Developed worsening of PE requiring admission from 4/26/21-5/11/21  in setting of low Apixaban levels. Admitted again From  7/13/21-7/25/21 for acute on chronic PE, switched to dabigitran + ASA.  Claims that she is taking the medicines properly at this time.  Presented with worsening of SOB of 1 month.  No chest pain. NM perfusion scan on 11/10/21 showed  at least 3 new perfusion defects In the background of chronic perfusion defects which are suspicion for acute on chronic PE. These developed in the setting of dabigatran and ASA which is concerning for anticoagulation failure.   - Appreciate hematology input. Started coumadin and bridge with high intensity heparin. APS workup negative.  - INR 2.0, would want at least 1 more day therapeutic overlapping with heparin gtt before discharge  - would want to be stable on RA before discharge      #Left arm pain, diffuse joint pain all over body, no evidence of acute chest syndrome, Acute pain  crisis with known h/o Sickle Cell Disease   Hgb at baseline, CXR w opacities likely pulmonary edema.   - Continue PTA MS contin 15mg CR BID  - Switch from Oxycodone 20mg PO  Q4 hours PRN to dilaudid oral 4-6 mg q3h PRN  - IV Dilaudid 0.5 mg q8H PRN, no escalation if able  - was started on IV CTX and azithromycin. Finished 3 days of azithro 500 mg x 3 days. Switch IV CTX to PO cefdinir to complete total 7 days.    # R forearm swelling  DVT US negative 11/16.      #sickle cell anemia  #Iron overload 2/2blood transfusion  - Continue PTA Hydrea 2000mg/daily  - Continue PTA Jetiti792ru/day     # Bronchial asthma  - Continue PTA  albuterol  - Continue PTA Symbicort     #Depression,  - Continue PTA duloxetine 30mg/day and abilify     #Hx of CVA           Diet: Combination Diet Regular Diet Adult    DVT Prophylaxis: Warfarin  Lopez Catheter: Not present  Central Lines: None  Code Status: Full Code      Disposition Plan   Expected discharge: 11/17/2021 recommended to prior living arrangement once INR stable >2.0, off supplemental O2.     The patient's care was discussed with the Bedside Nurse and Patient.      Brandyn Lange MD (Sally)  Internal Medicine/Pediatrics  Hospitalist  Hospitalist Service, [address]  Securely message with the Vocera Web Console (learn more here)  Text page via [facility] Paging/Directory    Please see sign in/sign out for up to date coverage information    ______________________________________________________________________    Interval History   No acute events overnight. Feeling ok today. No SOB, n/v, eating OK. No fevers or chills overnight.     Data reviewed today: I reviewed all medications, new labs and imaging results over the last 24 hours. I personally reviewed no images or EKG's today.    Physical Exam   Vital Signs: Temp: 99.9  F (37.7  C) Temp src: Oral BP: (!) 147/79 Pulse: 117   Resp: 18 SpO2: 93 % O2 Device: Nasal cannula Oxygen  Delivery: 3 LPM  Weight: 168 lbs 10.43 oz    General: awake, alert, in no acute distress  HEENT: NCAT, sclera anicteric, no nasal discharge, MMM  CV: RRR, no murmurs noted  Resp: CTAB, no increased WOB  Abd: Soft, nontender, nondistended, +BS, no rebound or guarding  MSK: No peripheral edema, extremities warm and well perfused, normal pulses, R forearm swelling with bruising  Skin: warm, dry, no jaundice  Neuro: No focal deficits      Data   Results for orders placed or performed during the hospital encounter of 11/10/21 (from the past 24 hour(s))   XR Chest Port 1 View    Narrative    EXAMINATION:  XR CHEST PORT 1 VIEW 11/15/2021 9:28 PM.    COMPARISON: 11/12/2021.    HISTORY:  hypoxia    FINDINGS: Portable AP view of the chest. Left IJ Port-A-Cath tip  projects over the superior cavoatrial junction. The cardiomediastinal  silhouette is within normal limits. Pulmonary vasculature is  indistinct. Hazy bilateral perihilar and left basilar pulmonary  opacities. Suspected small left pleural effusion. No pneumothorax.  Upper abdomen is unremarkable. No displaced rib fracture.      Impression    IMPRESSION:   1. Hazy bilateral perihilar pulmonary opacities and indistinctness of  the pulmonary vasculature, likely representing pulmonary edema.  Suspected trace left pleural effusion.  2. Retrocardiac atelectasis.    I have personally reviewed the examination and initial interpretation  and I agree with the findings.    ANGELITA ULLOA MD         SYSTEM ID:  [id number]   Heparin Unfractionated Anti Xa Level   Result Value Ref Range    Anti Xa Unfractionated Heparin 0.11 For Reference Range, See Comment IU/mL    Narrative    Therapeutic Range: UFH: 0.25-0.50 IU/mL for low intensity dosing,  0.30-0.70 IU/mL for high intensity dosing DVT and PE.  This test is not validated for other direct factor X inhibitors (e.g. rivaroxaban, apixaban, edoxaban, betrixaban, fondaparinux) and should not be used for monitoring of other  medications.   Heparin Unfractionated Anti Xa Level   Result Value Ref Range    Anti Xa Unfractionated Heparin 0.26 For Reference Range, See Comment IU/mL    Narrative    Therapeutic Range: UFH: 0.25-0.50 IU/mL for low intensity dosing,  0.30-0.70 IU/mL for high intensity dosing DVT and PE.  This test is not validated for other direct factor X inhibitors (e.g. rivaroxaban, apixaban, edoxaban, betrixaban, fondaparinux) and should not be used for monitoring of other medications.   INR   Result Value Ref Range    INR 2.00 (H) 0.85 - 1.15   Basic metabolic panel   Result Value Ref Range    Sodium 140 133 - 144 mmol/L    Potassium 3.7 3.4 - 5.3 mmol/L    Chloride 109 94 - 109 mmol/L    Carbon Dioxide (CO2) 26 20 - 32 mmol/L    Anion Gap 5 3 - 14 mmol/L    Urea Nitrogen 11 7 - 30 mg/dL    Creatinine 0.50 (L) 0.52 - 1.04 mg/dL    Calcium 8.2 (L) 8.5 - 10.1 mg/dL    Glucose 101 (H) 70 - 99 mg/dL    GFR Estimate >90 >60 mL/min/1.73m2   CBC with platelets   Result Value Ref Range    WBC Count 9.0 4.0 - 11.0 10e3/uL    RBC Count 2.31 (L) 3.80 - 5.20 10e6/uL    Hemoglobin 7.4 (L) 11.7 - 15.7 g/dL    Hematocrit 20.2 (L) 35.0 - 47.0 %    MCV 87 78 - 100 fL    MCH 32.0 26.5 - 33.0 pg    MCHC 36.6 (H) 31.5 - 36.5 g/dL    RDW 21.0 (H) 10.0 - 15.0 %    Platelet Count 352 150 - 450 10e3/uL   Heparin Unfractionated Anti Xa Level   Result Value Ref Range    Anti Xa Unfractionated Heparin 0.46 For Reference Range, See Comment IU/mL    Narrative    Therapeutic Range: UFH: 0.25-0.50 IU/mL for low intensity dosing,  0.30-0.70 IU/mL for high intensity dosing DVT and PE.  This test is not validated for other direct factor X inhibitors (e.g. rivaroxaban, apixaban, edoxaban, betrixaban, fondaparinux) and should not be used for monitoring of other medications.   US Upper Extremity Venous Duplex Right Portable    Narrative    EXAMINATION: DOPPLER VENOUS ULTRASOUND OF THE RIGHT UPPER EXTREMITY,  11/16/2021 3:22 PM     COMPARISON: None.    HISTORY:  Swelling, rule out DVT    TECHNIQUE:  Gray-scale evaluation with compression, spectral flow and  color Doppler assessment of the deep venous system of the right upper  extremity.    FINDINGS:  Right: Normal blood flow and waveforms are demonstrated in the  internal jugular, innominate, subclavian, and axillary veins. There is  normal compressibility of the brachial, basilic and cephalic veins.      Impression    IMPRESSION:  1.  No evidence of right upper extremity deep venous thrombosis.    I have personally reviewed the examination and initial interpretation  and I agree with the findings.    BRANDEN GARCIA MD         SYSTEM ID:  [id number]     *Note: Due to a large number of results and/or encounters for the requested time period, some results have not been displayed. A complete set of results can be found in Results Review.

## 2021-11-17 NOTE — PLAN OF CARE
Katie continues with lower back pain.  Took po Dilaudid X2 and IV x1 this am.  Heparin drip stopped and pt is on Warfarin.  PIV removed from right arm and IVAD heplocked.  IVAD needle due to be changed today - pt said she is going home tomorrow and so wants to wait until tomorrow to have needle out.  Will change needle tomorrow if she does not go home.  O2 at 2l NC with sats 92-95%.  LS with exp wheezes and diminished lower right.  Declined inhaler.

## 2021-11-18 ENCOUNTER — DOCUMENTATION ONLY (OUTPATIENT)
Dept: MEDSURG UNIT | Facility: CLINIC | Age: 21
End: 2021-11-18
Payer: COMMERCIAL

## 2021-11-18 LAB
BASE EXCESS BLDV CALC-SCNC: -0.9 MMOL/L (ref -8.1–1.9)
CA-I BLD-MCNC: 4.6 MG/DL (ref 4.4–5.2)
ERYTHROCYTE [DISTWIDTH] IN BLOOD BY AUTOMATED COUNT: 20.8 % (ref 10–15)
GLUCOSE BLD-MCNC: 98 MG/DL (ref 70–99)
HCO3 BLDV-SCNC: 24 MMOL/L (ref 21–28)
HCT VFR BLD AUTO: 19.3 % (ref 35–47)
HGB BLD-MCNC: 7.2 G/DL (ref 11.7–15.7)
HGB BLD-MCNC: 7.5 G/DL (ref 11.7–15.7)
HGB BLD-MCNC: 7.5 G/DL (ref 11.7–15.7)
HGB BLD-MCNC: 8.5 G/DL (ref 11.7–15.7)
INR PPP: 2.82 (ref 0.85–1.15)
LACTATE BLD-SCNC: 1 MMOL/L
MCH RBC QN AUTO: 33.2 PG (ref 26.5–33)
MCHC RBC AUTO-ENTMCNC: 38.9 G/DL (ref 31.5–36.5)
MCV RBC AUTO: 85 FL (ref 78–100)
O2/TOTAL GAS SETTING VFR VENT: 40 %
OXYHGB MFR BLDV: 53 % (ref 92–100)
OXYHGB MFR BLDV: 57 % (ref 92–100)
OXYHGB MFR BLDV: 90 % (ref 92–100)
PCO2 BLDV: 42 MM HG (ref 40–50)
PH BLDV: 7.37 [PH] (ref 7.32–7.43)
PLATELET # BLD AUTO: 328 10E3/UL (ref 150–450)
PO2 BLDV: 43 MM HG (ref 25–47)
POTASSIUM BLD-SCNC: 4 MMOL/L (ref 3.4–5.3)
POTASSIUM BLD-SCNC: 4 MMOL/L (ref 3.5–5)
RBC # BLD AUTO: 2.26 10E6/UL (ref 3.8–5.2)
SODIUM BLD-SCNC: 142 MMOL/L (ref 133–144)
UFH PPP CHRO-ACNC: 0.29 IU/ML
WBC # BLD AUTO: 10.4 10E3/UL (ref 4–11)

## 2021-11-18 PROCEDURE — 4A023N6 MEASUREMENT OF CARDIAC SAMPLING AND PRESSURE, RIGHT HEART, PERCUTANEOUS APPROACH: ICD-10-PCS | Performed by: INTERNAL MEDICINE

## 2021-11-18 PROCEDURE — 85027 COMPLETE CBC AUTOMATED: CPT | Performed by: STUDENT IN AN ORGANIZED HEALTH CARE EDUCATION/TRAINING PROGRAM

## 2021-11-18 PROCEDURE — 36591 DRAW BLOOD OFF VENOUS DEVICE: CPT | Performed by: STUDENT IN AN ORGANIZED HEALTH CARE EDUCATION/TRAINING PROGRAM

## 2021-11-18 PROCEDURE — 83605 ASSAY OF LACTIC ACID: CPT

## 2021-11-18 PROCEDURE — C1894 INTRO/SHEATH, NON-LASER: HCPCS | Performed by: INTERNAL MEDICINE

## 2021-11-18 PROCEDURE — 82330 ASSAY OF CALCIUM: CPT

## 2021-11-18 PROCEDURE — 250N000013 HC RX MED GY IP 250 OP 250 PS 637: Performed by: STUDENT IN AN ORGANIZED HEALTH CARE EDUCATION/TRAINING PROGRAM

## 2021-11-18 PROCEDURE — C1769 GUIDE WIRE: HCPCS | Performed by: INTERNAL MEDICINE

## 2021-11-18 PROCEDURE — 85018 HEMOGLOBIN: CPT

## 2021-11-18 PROCEDURE — 99233 SBSQ HOSP IP/OBS HIGH 50: CPT | Performed by: STUDENT IN AN ORGANIZED HEALTH CARE EDUCATION/TRAINING PROGRAM

## 2021-11-18 PROCEDURE — 250N000013 HC RX MED GY IP 250 OP 250 PS 637

## 2021-11-18 PROCEDURE — 84132 ASSAY OF SERUM POTASSIUM: CPT

## 2021-11-18 PROCEDURE — 250N000011 HC RX IP 250 OP 636: Performed by: INTERNAL MEDICINE

## 2021-11-18 PROCEDURE — 85520 HEPARIN ASSAY: CPT | Performed by: STUDENT IN AN ORGANIZED HEALTH CARE EDUCATION/TRAINING PROGRAM

## 2021-11-18 PROCEDURE — 250N000013 HC RX MED GY IP 250 OP 250 PS 637: Performed by: INTERNAL MEDICINE

## 2021-11-18 PROCEDURE — 84132 ASSAY OF SERUM POTASSIUM: CPT | Performed by: STUDENT IN AN ORGANIZED HEALTH CARE EDUCATION/TRAINING PROGRAM

## 2021-11-18 PROCEDURE — 82810 BLOOD GASES O2 SAT ONLY: CPT

## 2021-11-18 PROCEDURE — 120N000002 HC R&B MED SURG/OB UMMC

## 2021-11-18 PROCEDURE — 272N000001 HC OR GENERAL SUPPLY STERILE: Performed by: INTERNAL MEDICINE

## 2021-11-18 PROCEDURE — 99232 SBSQ HOSP IP/OBS MODERATE 35: CPT | Performed by: PHYSICIAN ASSISTANT

## 2021-11-18 PROCEDURE — 250N000013 HC RX MED GY IP 250 OP 250 PS 637: Performed by: EMERGENCY MEDICINE

## 2021-11-18 PROCEDURE — 82805 BLOOD GASES W/O2 SATURATION: CPT

## 2021-11-18 PROCEDURE — 85610 PROTHROMBIN TIME: CPT | Performed by: INTERNAL MEDICINE

## 2021-11-18 PROCEDURE — 250N000011 HC RX IP 250 OP 636: Performed by: STUDENT IN AN ORGANIZED HEALTH CARE EDUCATION/TRAINING PROGRAM

## 2021-11-18 PROCEDURE — 93451 RIGHT HEART CATH: CPT | Performed by: INTERNAL MEDICINE

## 2021-11-18 PROCEDURE — 84295 ASSAY OF SERUM SODIUM: CPT

## 2021-11-18 PROCEDURE — 250N000009 HC RX 250: Performed by: INTERNAL MEDICINE

## 2021-11-18 RX ORDER — WARFARIN SODIUM 5 MG/1
5 TABLET ORAL
Status: COMPLETED | OUTPATIENT
Start: 2021-11-18 | End: 2021-11-18

## 2021-11-18 RX ORDER — HYDROMORPHONE HYDROCHLORIDE 1 MG/ML
0.5 INJECTION, SOLUTION INTRAMUSCULAR; INTRAVENOUS; SUBCUTANEOUS EVERY 4 HOURS PRN
Status: DISCONTINUED | OUTPATIENT
Start: 2021-11-18 | End: 2021-11-20

## 2021-11-18 RX ADMIN — WARFARIN SODIUM 5 MG: 5 TABLET ORAL at 18:14

## 2021-11-18 RX ADMIN — PANTOPRAZOLE SODIUM 40 MG: 40 TABLET, DELAYED RELEASE ORAL at 09:53

## 2021-11-18 RX ADMIN — DULOXETINE 30 MG: 30 CAPSULE, DELAYED RELEASE ORAL at 09:53

## 2021-11-18 RX ADMIN — MORPHINE SULFATE 15 MG: 15 TABLET, EXTENDED RELEASE ORAL at 09:53

## 2021-11-18 RX ADMIN — HYDROMORPHONE HYDROCHLORIDE 6 MG: 4 TABLET ORAL at 19:53

## 2021-11-18 RX ADMIN — ACETAMINOPHEN 975 MG: 325 TABLET, FILM COATED ORAL at 09:53

## 2021-11-18 RX ADMIN — SODIUM CHLORIDE, PRESERVATIVE FREE 5 ML: 5 INJECTION INTRAVENOUS at 17:53

## 2021-11-18 RX ADMIN — HYDROMORPHONE HYDROCHLORIDE 0.5 MG: 1 INJECTION, SOLUTION INTRAMUSCULAR; INTRAVENOUS; SUBCUTANEOUS at 13:03

## 2021-11-18 RX ADMIN — SODIUM CHLORIDE, PRESERVATIVE FREE 5 ML: 5 INJECTION INTRAVENOUS at 10:07

## 2021-11-18 RX ADMIN — DULOXETINE 30 MG: 30 CAPSULE, DELAYED RELEASE ORAL at 19:47

## 2021-11-18 RX ADMIN — HYDROMORPHONE HYDROCHLORIDE 0.5 MG: 1 INJECTION, SOLUTION INTRAMUSCULAR; INTRAVENOUS; SUBCUTANEOUS at 18:15

## 2021-11-18 RX ADMIN — MORPHINE SULFATE 15 MG: 15 TABLET, EXTENDED RELEASE ORAL at 21:48

## 2021-11-18 RX ADMIN — SODIUM CHLORIDE, PRESERVATIVE FREE 5 ML: 5 INJECTION INTRAVENOUS at 13:04

## 2021-11-18 RX ADMIN — ARIPIPRAZOLE 2 MG: 2 TABLET ORAL at 09:53

## 2021-11-18 RX ADMIN — ACETAMINOPHEN 975 MG: 325 TABLET, FILM COATED ORAL at 18:14

## 2021-11-18 RX ADMIN — HYDROMORPHONE HYDROCHLORIDE 6 MG: 4 TABLET ORAL at 05:05

## 2021-11-18 RX ADMIN — HYDROMORPHONE HYDROCHLORIDE 0.5 MG: 1 INJECTION, SOLUTION INTRAMUSCULAR; INTRAVENOUS; SUBCUTANEOUS at 02:04

## 2021-11-18 RX ADMIN — HYDROXYUREA 2000 MG: 500 CAPSULE ORAL at 09:53

## 2021-11-18 RX ADMIN — CEFDINIR 300 MG: 300 CAPSULE ORAL at 09:53

## 2021-11-18 RX ADMIN — CEFDINIR 300 MG: 300 CAPSULE ORAL at 19:48

## 2021-11-18 RX ADMIN — HYDROMORPHONE HYDROCHLORIDE 0.5 MG: 1 INJECTION, SOLUTION INTRAMUSCULAR; INTRAVENOUS; SUBCUTANEOUS at 10:02

## 2021-11-18 RX ADMIN — DEFERASIROX 1440 MG: 360 TABLET ORAL at 19:47

## 2021-11-18 ASSESSMENT — ACTIVITIES OF DAILY LIVING (ADL)
ADLS_ACUITY_SCORE: 7

## 2021-11-18 ASSESSMENT — MIFFLIN-ST. JEOR: SCORE: 1507.95

## 2021-11-18 NOTE — PROGRESS NOTES
Home Oxygen Assessment Tools  Patient's 02 sat on RA at rest 90 % for 2  minutes.     Patient is on 2 LPM/%  NC Sp02 95 %, after 2 minutes of standing on the side of the bed.     If patient's Sp02 at rest is less than 88%, then oxygen may be needed and must monitor patient's Sp02 with activity.     Patient 02 81 % sat on  RA with activity after 2 minutes. Patient's Sp02 81 % on LPM/02% after 2  minutes of ( walking activity).

## 2021-11-18 NOTE — CONSULTS
Cardiology Consult                                                               2021  Jennifer Cervantes MRN: [id number]  Age: 21 year old, : 2000        Reason for consult:      Acute on chronic PE, rule out CTEPH        Assessment and Recommendation:     21 year old year old female with PMH of sickle cell disease, CVA, anxiety/depression, asthma, recurrent PE presented for worsening SOB.   V/Q scan showed new right lower lateral segment perfusion defect with multiple bilateral chronic perfusion defects.  Cardiology consulted for concern for CTEPH in setting of chronic/recurrent PE.    Recommendations:  - RHC obtained: RA 7, PA: 27/13/15, PCW:7, which rules out CTEPH.  - Anticoagulation with warfarin.      Cardiology will sign off. Please page [phone] with questions!    Patient discussed with staff attending, Dr. Carlos and the note reflects our joint plan. Thank you for consulting the cardiovascular services at the [facility]. Please do not hesitate to call with questions or concerns.     Montana Orozco MD    PGY-5, Cardiology Fellow  Pager: [phone]        History of Present Illness:     21 year old year old female with PMH of sickle cell disease, CVA, anxiety/depression, asthma, recurrent PE presented for worsening SOB.   As by patient, for the last few days, she has been having progressively worsening SOB at minimal exertion, that did not improve with albuterol.  She also complains of diffuse body pain (including some chest pressure), especially at the level of her lower back.   She denies nausea or vomiting.  She endorses compliance with her medications.  A 13-point ROS is negative except as mentioned above      Past Medical History:     Patient Active Problem List   Diagnosis     Sickle cell disease (H)     Moderate persistent asthma without complication     Anxiety     Constipation     Hemiplegia and hemiparesis following cerebral infarction affecting  right dominant side (H)     Iron overload due to repeated red blood cell transfusions     Hemochromatosis due to repeated red blood cell transfusions     Cognitive developmental delay     Oppositional defiant behavior     Cerebral infarction (H)     Sickle cell pain crisis (H)     Presence of intrauterine contraceptive device     Panic disorder     Overweight     Migraine headache     Hypoxemia     Hydrosalpinx     H/O: stroke     Gastritis     Depressive disorder     Chronic back pain     Allergic reaction     Acute pain     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)     History of stroke     Sickle cell crisis (H)     Multiple subsegmental pulmonary emboli without acute cor pulmonale (H)     Atypical squamous cells of undetermined significance on cytologic smear of cervix (ASC-US)     Depo-Provera contraceptive status     Superficial venous thrombosis of arm, right     Acute respiratory failure with hypoxia (H)     Spasticity     Spastic hemiplegia (H)     Acute chest syndrome (H)     Encounter for insertion of venous access port     Feeling angry     Transient ischemic attack     Uses contraception     Pulmonary embolism, other, unspecified chronicity, unspecified whether acute cor pulmonale present (H)         Past Surgical History:      Past Surgical History:   Procedure Laterality Date     AS INSERT TUNNELED CV 2 CATH W/O PORT/PUMP       C BREAST REDUCTION (INCLUDES LIPO) TIER 3 Bilateral 04/23/2019     CHOLECYSTECTOMY       INSERT PORT VASCULAR ACCESS Left 4/21/2021    Procedure: INSERTION, VASCULAR ACCESS PORT (NOT SURE ON SIDE UNTIL REMOVAL);  Surgeon: Rajan More MD;  Location: [facility] OR     IR CHEST PORT PLACEMENT > 5 YRS OF AGE  4/21/2021     IR CVC NON TUNNEL LINE REMOVAL  5/6/2021     IR CVC NON TUNNEL PLACEMENT  04/07/2020     IR CVC NON TUNNEL PLACEMENT  4/30/2021     IR PORT REMOVAL LEFT  4/21/2021     REMOVE PORT VASCULAR ACCESS Left 4/21/2021    Procedure: REMOVAL, VASCULAR ACCESS PORT LEFT;  Surgeon:  "Rajan More MD;  Location: [facility] OR     REPAIR TENDON ELBOW Right 10/02/2019    Procedure: Right Elbow Flexor Lengthening, Flexor Pronator Slide Of Wrist and Finger, Thumb Adductor Lengthening;  Surgeon: Anai Franco MD;  Location: [facility] OR     TONSILLECTOMY Bilateral 10/02/2019    Procedure: Bilateral Tonsillectomy;  Surgeon: Farhana Guy MD;  Location: [facility] OR         Social History:     Social History     Socioeconomic History     Marital status: Single     Spouse name: Not on file     Number of children: Not on file     Years of education: Not on file     Highest education level: Not on file   Occupational History     Not on file   Tobacco Use     Smoking status: Never Smoker     Smokeless tobacco: Never Used   Substance and Sexual Activity     Alcohol use: Not Currently     Alcohol/week: 0.0 standard drinks     Drug use: Never     Sexual activity: Not Currently     Partners: Male     Birth control/protection: Other   Other Topics Concern     Parent/sibling w/ CABG, MI or angioplasty before 65F 55M? Not Asked   Social History Narrative    Lives with mom and extended family but \"toxic environment\" per her report. She would like to move out but cannot financially do so. She has minimal support at home despite her significant SCD comorbidities and cognitive delay from stroke.     Social Determinants of Health     Financial Resource Strain: Not on file   Food Insecurity: Not on file   Transportation Needs: Not on file   Physical Activity: Not on file   Stress: Not on file   Social Connections: Not on file   Intimate Partner Violence: Not At Risk     Fear of Current or Ex-Partner: No     Emotionally Abused: No     Physically Abused: No     Sexually Abused: No   Housing Stability: Not on file         Family History:     Family History   Problem Relation Age of Onset     Sickle Cell Trait Mother      Hypertension Mother      Asthma Mother      Sickle Cell Trait Father          Allergies: "     Allergies   Allergen Reactions     Contrast Dye      Hives and breathing issues     Fish-Derived Products Hives     Seafood Hives     Diagnostic X-Ray Materials      Gadolinium          Medications:     Current Facility-Administered Medications   Medication     acetaminophen (TYLENOL) tablet 650 mg     acetaminophen (TYLENOL) tablet 975 mg     albuterol (PROAIR HFA/PROVENTIL HFA/VENTOLIN HFA) 108 (90 Base) MCG/ACT inhaler 2 puff     albuterol (PROVENTIL) neb solution 2.5 mg     ARIPiprazole (ABILIFY) tablet 2 mg     cefdinir (OMNICEF) capsule 300 mg     deferasirox (JADENU) tablet 1,440 mg     diphenhydrAMINE (BENADRYL) capsule 25-50 mg     DULoxetine (CYMBALTA) DR capsule 30 mg     fluticasone-vilanterol (BREO ELLIPTA) 200-25 MCG/INH inhaler 1 puff     heparin 100 UNIT/ML injection 5-10 mL     heparin lock flush 10 UNIT/ML injection 5-10 mL     HYDROmorphone (DILAUDID) tablet 6 mg     HYDROmorphone (PF) (DILAUDID) injection 0.5 mg     hydroxyurea (HYDREA) capsule 2,000 mg     hydrOXYzine (ATARAX) tablet 25 mg     lidocaine 1 % 0.1-1 mL     melatonin tablet 3 mg     morphine (MS CONTIN) 12 hr tablet 15 mg     naloxone (NARCAN) injection 0.2 mg    Or     naloxone (NARCAN) injection 0.4 mg    Or     naloxone (NARCAN) injection 0.2 mg    Or     naloxone (NARCAN) injection 0.4 mg     ondansetron (ZOFRAN) tablet 8 mg     pantoprazole (PROTONIX) EC tablet 40 mg     Patient is already receiving anticoagulation with heparin, enoxaparin (LOVENOX), warfarin (COUMADIN)  or other anticoagulant medication     senna-docusate (SENOKOT-S/PERICOLACE) 8.6-50 MG per tablet 1 tablet    Or     senna-docusate (SENOKOT-S/PERICOLACE) 8.6-50 MG per tablet 2 tablet     sodium chloride (PF) 0.9% PF flush 10-20 mL     sodium chloride (PF) 0.9% PF flush 3 mL     sodium chloride (PF) 0.9% PF flush 3 mL     warfarin ANTICOAGULANT (COUMADIN) tablet 5 mg     Warfarin Therapy Reminder (Check START DATE - warfarin may be starting in the FUTURE)        No current facility-administered medications on file prior to encounter.  acetaminophen (TYLENOL) 325 MG tablet, Take 2 tablets (650 mg) by mouth every 6 hours as needed for mild pain  albuterol (PROAIR HFA/PROVENTIL HFA/VENTOLIN HFA) 108 (90 Base) MCG/ACT inhaler, Inhale 2 puffs into the lungs every 6 hours as needed for shortness of breath / dyspnea or wheezing  ARIPiprazole (ABILIFY) 2 MG tablet, Take 1 tablet (2 mg) by mouth daily  aspirin (ASA) 81 MG chewable tablet, Take 1 tablet (81 mg) by mouth daily  budesonide-formoterol (SYMBICORT) 160-4.5 MCG/ACT Inhaler, Inhale 2 puffs into the lungs 2 times daily  diphenhydrAMINE (BENADRYL) 25 MG capsule, Take 1-2 capsules (25-50 mg) by mouth nightly as needed for sleep  DULoxetine (CYMBALTA) 30 MG capsule, Take 1 capsule (30 mg) by mouth 2 times daily  EPINEPHrine (ANY BX GENERIC EQUIV) 0.3 MG/0.3ML injection 2-pack, Inject 0.3 mLs (0.3 mg) into the muscle as needed for anaphylaxis  Hydroxyurea 1000 MG TABS, Take 2,000 mg by mouth daily  hydrOXYzine (ATARAX) 25 MG tablet, Take 1 tablet (25 mg) by mouth 3 times daily as needed for anxiety  JADENU 360 MG tablet, Take 4 tablets (1,440 mg) by mouth every evening  morphine (MS CONTIN) 15 MG CR tablet, Take 1 tablet (15 mg) by mouth every 12 hours Take 1/2 tablet every 12 hours initially  naloxone (NARCAN) 4 MG/0.1ML nasal spray, Spray 1 spray (4 mg) into one nostril alternating nostrils once as needed for opioid reversal every 2-3 minutes until assistance arrives  omeprazole (PRILOSEC) 20 MG DR capsule, Take 1 capsule (20 mg) by mouth daily  ondansetron (ZOFRAN) 8 MG tablet, Take 1 tablet (8 mg) by mouth every 8 hours as needed  oxyCODONE IR (ROXICODONE) 15 MG tablet, Take 1 tablet (15mg) by mouth every 4-6 hours as needed for severe pain. Goal 4 per day. Max 6 per day.  albuterol (PROVENTIL) (2.5 MG/3ML) 0.083% neb solution, Take 1 vial (2.5 mg) by nebulization every 6 hours as needed for shortness of breath /  "dyspnea or wheezing (Patient not taking: Reported on 11/10/2021)  lidocaine-prilocaine (EMLA) 2.5-2.5 % external cream, Apply topically once for 1 dose Use for port site as needed (Patient not taking: Reported on 11/10/2021)  medroxyPROGESTERone (DEPO-PROVERA) 150 MG/ML IM injection, Inject 150 mg into the muscle            Physical Exam:     /59 (BP Location: Left arm)   Pulse 96   Temp 99.1  F (37.3  C) (Oral)   Resp 18   Ht 1.626 m (5' 4\")   Wt 76.3 kg (168 lb 3.2 oz)   SpO2 (!) 84%   BMI 28.87 kg/m      Wt Readings from Last 4 Encounters:   11/18/21 76.3 kg (168 lb 3.2 oz)   11/03/21 75.8 kg (167 lb 3.2 oz)   11/01/21 75.8 kg (167 lb)   10/19/21 77 kg (169 lb 11.2 oz)         Intake/Output Summary (Last 24 hours) at 11/18/2021 1022  Last data filed at 11/18/2021 0954  Gross per 24 hour   Intake 890 ml   Output 2550 ml   Net -1660 ml       Gen: AA&Ox3  HEENT: EOM grossly intact, mucous membranes pink, moist without plaque or exudate  PULM/THORAX: Clear to auscultation bilaterally, no rales/rhonchi/wheezes  CV:RRR, S1 and S2 appreciated, no extra heart sounds, murmurs or rub auscultated. No JVD  ABD: obese, soft, nontender, nondistended. Normoactive bowel sounds  EXT: No edema, clubbing or cyanosis. No asymmetrical edema or tenderness to palpation in calves bilaterally.  PSYCH: patient in moderate distress because of diffuse body pain.  MUSCULOSKELETAL: No joint swelling or tenderness.   SKIN: No jaundice.      Data:     Labs Reviewed on Admission    Troponin   Lab Results   Component Value Date    TROPI <0.015 06/12/2021    TROPI <0.015 04/29/2021    TROPI <0.015 04/28/2021    TROPI <0.015 04/22/2021    TROPI <0.015 04/17/2021    TROPONIN <0.015 11/10/2021    TROPONIN <0.015 11/10/2021    TROPONIN <0.015 09/17/2021    TROPONIN <0.015 09/14/2021    TROPONIN <0.015 09/04/2021     [facility]  Recent Labs   Lab 11/18/21  0603 11/17/21  0609 11/16/21  0650 11/15/21  0603 11/14/21  0625 11/13/21  0524 " 11/12/21  0607   NA  --   --  140  --  138 139 139   POTASSIUM 4.0 3.6 3.7 3.6 4.0 4.6 3.6   CHLORIDE  --   --  109  --  110* 110* 110*   MICAH  --   --  8.2*  --  8.8 8.5 8.4*   CO2  --   --  26  --  24 23 23   BUN  --   --  11  --  9 10 6*   CR  --   --  0.50*  --  0.54 0.52 0.62   GLC  --   --  101*  --  111* 102* 84     CBC  Recent Labs   Lab 11/18/21  0603 11/17/21  0609 11/16/21  0650 11/15/21  0603   WBC 10.4 9.2 9.0 10.5   RBC 2.26* 2.24* 2.31* 2.32*   HGB 7.5* 7.2* 7.4* 7.3*   HCT 19.3* 19.6* 20.2* 20.5*   MCV 85 88 87 88   MCH 33.2* 32.1 32.0 31.5   MCHC 38.9* 36.7* 36.6* 35.6   RDW 20.8* 21.2* 21.0* 20.8*    344 352 322     INR  Recent Labs   Lab 11/18/21 0603 11/17/21  0609 11/16/21  0650 11/15/21  0603   INR 2.82* 2.26* 2.00* 1.88*      Hepatic Panel   Lab Results   Component Value Date     11/10/2021    AST 73 07/10/2021     Lab Results   Component Value Date    ALT 85 11/10/2021    ALT 70 07/10/2021     No results found for: BILICONJ   Lab Results   Component Value Date    BILITOTAL 3.5 11/10/2021    BILITOTAL 3.6 07/10/2021     Lab Results   Component Value Date    ALBUMIN 3.8 11/10/2021    ALBUMIN 4.0 07/10/2021     Lab Results   Component Value Date    PROTTOTAL 7.6 11/10/2021    PROTTOTAL 7.6 07/10/2021      Lab Results   Component Value Date    ALKPHOS 79 11/10/2021    ALKPHOS 69 07/10/2021           Most Recent Imaging:     EKG 11/10/2021:       Echo 11/12/2021:   Global and regional left ventricular function is normal with an EF of 55-60%.  The right ventricle is normal in size and function.  No significant valvular abnormality.  PA systolic pressure could not be assessed.  No pericardial effusion.      Cath 11/18/2021:   RA:--/--/7  RV: 28/7  PA: 27/13/15  PCW:--/--/7    James CO/CI: 5.02/2.76  TD CO/CI: 6.57/3.61

## 2021-11-18 NOTE — PROGRESS NOTES
Received intake call for home oxygen at 1:19pm from my coworker Aravind. Reviewed patient's chart; Patient qualifies under insurance guidelines and all documentation is in the chart including a good order.   1:36pm- Spoke with care coordinator, Jake, confirmed we received the order, and provided them with ETA of transport oxygen. He transferred me to the patient's room phone, there was no answer.  1:40pm- Called patient's cell phone to offer choice and patient is okay with [facility] setting them up. Discussed equipment with patient and informed them that we would be to bedside with transport oxygen in the next 2 hours. She was instructed to call [facility] when she discharges from hospital so we can meet her at home with the rest of her equipment.   3:34pm- I called Jake to confirm whether or not patient is going home tonight, because notes seem to indicate she is not discharging until tomorrow. He told me that she is not, he understands that if patient doesn't discharge on 11/19 we will need to get new documentation  .

## 2021-11-18 NOTE — PROGRESS NOTES
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         # Acute hypoxic respiratory failure, on 2-4 L  # Acute on chronic recurrent pulmonary embolism  Diagnosed with PE on 2/1/21 started on rivaroxaban . Switched to apixaban on  3/25/21 with RUE DVT. Developed worsening of PE requiring admission from 4/26/21-5/11/21  in setting of low Apixaban levels. Admitted again From  7/13/21-7/25/21 for acute on chronic PE, switched to dabigitran + ASA.  Claims that she is taking the medicines properly at this time.  Presented with worsening of SOB of 1 month.  No chest pain. NM perfusion scan on 11/10/21 showed  at least 3 new perfusion defects In the background of chronic perfusion defects which are suspicion for acute on chronic PE. These developed in the setting of dabigatran and ASA which is concerning for anticoagulation failure.   - Appreciate hematology input. Started coumadin and bridge with high intensity heparin. APS workup negative. INR therapeutic x 2 days, stop hep gtt.   - would want to be stable on RA before discharge ideally but requiring 2-4L today. No wheezing on exam, lower concern for asthma exacerbation. CXR with decreased opacities likely atelectasis vs edema. She has  already urinated >1800mL today on her own, no need to further diuresis.   - d/w hematology - considering possibility of CTEPH. Can d/w cardiology in AM.   - encourage IS  - may need O2 walk test tmw if still needing O2 overnight, would be OK to discharge on 2L or so but more than that, would see if pt willing to stay to see if improvement with continued anticoagulation    #Left arm pain, diffuse joint pain all over body, no evidence of acute chest syndrome, Acute pain crisis with known h/o Sickle Cell Disease   Hgb at baseline, CXR w opacities likely pulmonary edema, improved on 11/17 CXR.  - Continue PTA MS contin 15mg CR BID  - Switch from Oxycodone 20mg PO  Q4 hours PRN to dilaudid oral 4-6 mg q3h PRN  - IV Dilaudid 0.5 mg q8H PRN, no escalation if able  - was started on IV CTX and azithromycin. Finished 3 days of azithro 500 mg x 3 days. Switch IV CTX to PO cefdinir to complete total 7 days.    # R forearm swelling  DVT US negative 11/16.      #sickle cell anemia  #Iron overload 2/2blood transfusion  - Continue PTA Hydrea 2000mg/daily  - Continue PTA [id number]/day     # Bronchial asthma  - Continue PTA  albuterol  - Continue PTA Symbicort     #Depression,  - Continue PTA duloxetine 30mg/day and abilify     #Hx of CVA          Diet: Combination Diet Regular Diet Adult    DVT Prophylaxis: Warfarin  Lopez Catheter: Not present  Central Lines: None  Code Status: Full Code      Disposition Plan   Expected discharge: 11/19/2021   recommended to prior living arrangement once O2 use less than 2 liters/minute.     The patient's care was discussed with the Bedside Nurse, Patient and hematology Consultant.    Brandyn Lange MD (Sally)  Internal Medicine/Pediatrics  Hospitalist    Hospitalist Service, [address]  Securely message with the Vocera Web Console (learn more here)  Text page via SeeOn Paging/Directory    Please see sign in/sign out for up to date coverage  information      ______________________________________________________________________    Interval History   No acute evnets overnight. This morning needing up to 4L O2 but denying SOB, chest pain, pleuritic pain, wheezing.     Data reviewed today: I reviewed all medications, new labs and imaging results over the last 24 hours. I personally reviewed the chest x-ray image(s) showing decreased opacities.    Physical Exam   Vital Signs: Temp: 97.4  F (36.3  C) Temp src: Oral BP: 124/52 Pulse: 100   Resp: 18 SpO2: 95 % O2 Device: Nasal cannula Oxygen Delivery: 2 LPM  Weight: 168 lbs 10.43 oz    General: awake, alert, in no acute distress  HEENT: NCAT, sclera anicteric, no nasal discharge, MMM  CV: RRR, no murmurs noted  Resp: CTAB, no wheezing, no crackles, no increased WOB, on 2-4L oxymask  Abd: Soft, nontender, nondistended, +BS, no rebound or guarding  MSK: No peripheral edema, extremities warm and well perfused, normal pulses, R forearm swelling with bruising  Skin: warm, dry, no jaundice  Neuro: No focal deficits    Data     Results for orders placed or performed during the hospital encounter of 11/10/21 (from the past 24 hour(s))   Heparin Unfractionated Anti Xa Level   Result Value Ref Range    Anti Xa Unfractionated Heparin 0.61 For Reference Range, See Comment IU/mL    Narrative    Therapeutic Range: UFH: 0.25-0.50 IU/mL for low intensity dosing,  0.30-0.70 IU/mL for high intensity dosing DVT and PE.  This test is not validated for other direct factor X inhibitors (e.g. rivaroxaban, apixaban, edoxaban, betrixaban, fondaparinux) and should not be used for monitoring of other medications.   INR   Result Value Ref Range    INR 2.26 (H) 0.85 - 1.15   Potassium   Result Value Ref Range    Potassium 3.6 3.4 - 5.3 mmol/L   CBC with platelets   Result Value Ref Range    WBC Count 9.2 4.0 - 11.0 10e3/uL    RBC Count 2.24 (L) 3.80 - 5.20 10e6/uL    Hemoglobin 7.2 (L) 11.7 - 15.7 g/dL    Hematocrit 19.6 (L) 35.0 - 47.0 %     MCV 88 78 - 100 fL    MCH 32.1 26.5 - 33.0 pg    MCHC 36.7 (H) 31.5 - 36.5 g/dL    RDW 21.2 (H) 10.0 - 15.0 %    Platelet Count 344 150 - 450 10e3/uL   Heparin Unfractionated Anti Xa Level   Result Value Ref Range    Anti Xa Unfractionated Heparin 0.56 For Reference Range, See Comment IU/mL    Narrative    Therapeutic Range: UFH: 0.25-0.50 IU/mL for low intensity dosing,  0.30-0.70 IU/mL for high intensity dosing DVT and PE.  This test is not validated for other direct factor X inhibitors (e.g. rivaroxaban, apixaban, edoxaban, betrixaban, fondaparinux) and should not be used for monitoring of other medications.   XR Chest Port 1 View    Narrative    EXAM: XR CHEST PORT 1 VIEW  11/17/2021 3:59 PM     HISTORY:  increased hypoxia, evaluate new infiltrate, edema       COMPARISON:  Chest x-ray 11/15/2021    FINDINGS:   Frontal radiograph of the chest. Left chest wall port catheter tip  terminates at the superior cavoatrial junction. Trachea is midline.  Cardiac size is within normal limits. Mild perihilar opacities,  decreased prior exam. No pleural effusion or pneumothorax. Osseous  structures are within normal limits.      Impression    IMPRESSION:   Decreased perihilar opacities, likely representing atelectasis/edema.  No new airspace opacities.    I have personally reviewed the examination and initial interpretation  and I agree with the findings.    MARY JANE JUÁREZ MD         SYSTEM ID:  [id number]     *Note: Due to a large number of results and/or encounters for the requested time period, some results have not been displayed. A complete set of results can be found in Results Review.

## 2021-11-18 NOTE — PROGRESS NOTES
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  Negative APS testing.   Had CXR showing infiltrates/cf infection on 11/12 and started on abx.  She states pain is not controlled but it is difficult to tell how much higher it is than her baseline at home.     Recommendations:   -Continue warfarin for PE.  Referral placed for outpatient coagulation clinic  -Continue with pain plan, prefer not to escalate further as she has chronic pain   -From hematology standpoint ok to send home with o2  -Await cardiology recommendations for pulmonary hypertension work-up     Plan of care was discussed with attending physician Dr. Hernandez but pt seen and examined only by me     We will continue to follow this patient. Please don't hesitate to contact the Fellow On-Call with questions.    Cherelle Brock PA-C  [facility] Hematology  [phone]    ___________________________________________________________________  Subjective & Interval History:    No acute events noted overnight.  Still requiring O2.   She reports pain is not controlled and she has been scared to ask for more pain meds.  When I asked her how much higher her current pain is than her baseline pain at home she  "said she was done talking to me so I was unable to determine that.  I did explain that cardiology would be coming to see her today to evaluate for pulmonary hypertension.  She denies feeling shortness of breath.     Current pain plan:  -Lidocaine infusion (written 11/14 but pt refused)   -MS contin 15 mg PO Q 12 hours   -dilaudid 6 mg PO q3 hours PRN   -dilaudid 0.5 mg IV q 8 hours PRN (added 11/15)     Physical Exam:    /59 (BP Location: Left arm)   Pulse 96   Temp 99.1  F (37.3  C) (Oral)   Resp 18   Ht 1.626 m (5' 4\")   Wt 76.3 kg (168 lb 3.2 oz)   SpO2 (!) 84%   BMI 28.87 kg/m    Gen: Well appearing, in NAD.  Seen lying in hospital bed.   Remaining physical exam deferred as pt did not want to continue with visit.     Labs & Studies: I personally reviewed the following studies:  ROUTINE LABS (Last four results):  CMP  Recent Labs   Lab 11/18/21 0603 11/17/21 0609 11/16/21  0650 11/15/21  0603 11/14/21  0625 11/13/21  0524 11/12/21  0607   NA  --   --  140  --  138 139 139   POTASSIUM 4.0 3.6 3.7 3.6 4.0 4.6 3.6   CHLORIDE  --   --  109  --  110* 110* 110*   CO2  --   --  26  --  24 23 23   ANIONGAP  --   --  5  --  4 6 6   GLC  --   --  101*  --  111* 102* 84   BUN  --   --  11  --  9 10 6*   CR  --   --  0.50*  --  0.54 0.52 0.62   GFRESTIMATED  --   --  >90  --  >90 >90 >90   MICAH  --   --  8.2*  --  8.8 8.5 8.4*     CBC  Recent Labs   Lab 11/18/21 0603 11/17/21 0609 11/16/21 0650 11/15/21  0603   WBC 10.4 9.2 9.0 10.5   RBC 2.26* 2.24* 2.31* 2.32*   HGB 7.5* 7.2* 7.4* 7.3*   HCT 19.3* 19.6* 20.2* 20.5*   MCV 85 88 87 88   MCH 33.2* 32.1 32.0 31.5   MCHC 38.9* 36.7* 36.6* 35.6   RDW 20.8* 21.2* 21.0* 20.8*    344 352 322     INR  Recent Labs   Lab 11/18/21  0603 11/17/21  0609 11/16/21  0650 11/15/21  0603   INR 2.82* 2.26* 2.00* 1.88*       Medications list for reference:  Current Facility-Administered Medications   Medication     acetaminophen (TYLENOL) tablet 650 mg     acetaminophen " (TYLENOL) tablet 975 mg     albuterol (PROAIR HFA/PROVENTIL HFA/VENTOLIN HFA) 108 (90 Base) MCG/ACT inhaler 2 puff     albuterol (PROVENTIL) neb solution 2.5 mg     ARIPiprazole (ABILIFY) tablet 2 mg     cefdinir (OMNICEF) capsule 300 mg     deferasirox (JADENU) tablet 1,440 mg     diphenhydrAMINE (BENADRYL) capsule 25-50 mg     DULoxetine (CYMBALTA) DR capsule 30 mg     fluticasone-vilanterol (BREO ELLIPTA) 200-25 MCG/INH inhaler 1 puff     heparin 100 UNIT/ML injection 5-10 mL     heparin lock flush 10 UNIT/ML injection 5-10 mL     HYDROmorphone (DILAUDID) tablet 6 mg     HYDROmorphone (PF) (DILAUDID) injection 0.5 mg     hydroxyurea (HYDREA) capsule 2,000 mg     hydrOXYzine (ATARAX) tablet 25 mg     lidocaine 1 % 0.1-1 mL     melatonin tablet 3 mg     morphine (MS CONTIN) 12 hr tablet 15 mg     naloxone (NARCAN) injection 0.2 mg    Or     naloxone (NARCAN) injection 0.4 mg    Or     naloxone (NARCAN) injection 0.2 mg    Or     naloxone (NARCAN) injection 0.4 mg     ondansetron (ZOFRAN) tablet 8 mg     pantoprazole (PROTONIX) EC tablet 40 mg     Patient is already receiving anticoagulation with heparin, enoxaparin (LOVENOX), warfarin (COUMADIN)  or other anticoagulant medication     senna-docusate (SENOKOT-S/PERICOLACE) 8.6-50 MG per tablet 1 tablet    Or     senna-docusate (SENOKOT-S/PERICOLACE) 8.6-50 MG per tablet 2 tablet     sodium chloride (PF) 0.9% PF flush 10-20 mL     sodium chloride (PF) 0.9% PF flush 3 mL     sodium chloride (PF) 0.9% PF flush 3 mL     warfarin ANTICOAGULANT (COUMADIN) tablet 5 mg     Warfarin Therapy Reminder (Check START DATE - warfarin may be starting in the FUTURE)

## 2021-11-18 NOTE — PROGRESS NOTES
Care Management Follow Up    Length of Stay (days): 8    Expected Discharge Date: 11/19/2021     Concerns to be Addressed: discharge planning,care coordination/care conferences     Patient plan of care discussed at interdisciplinary rounds: Yes    Anticipated Discharge Disposition: Home     Anticipated Discharge Services: PCA,County Worker,Other (see comment) (ILS worker)  Anticipated Discharge DME: None    Patient/family educated on Medicare website which has current facility and service quality ratings: no  Education Provided on the Discharge Plan:    Patient/Family in Agreement with the Plan: yes    Referrals Placed by CM/SW: External Care Coordination  Private pay costs discussed: Not applicable    Additional Information:  [facility], ph: [phone], fax: [phone]. Home O2 test done this morning by bedside RN, provider home O2 DME order in and provider face to face documentation in as required. If patient to discharge over weekend, may need another home O2 test done by bedside nurse to qualify for home O2. Weekend bedside nurse or RNCC would need to page [facility] RT on call by calling the number above. RNCC available for any further discharge planning needs.    Guerline Abreu RNCC  Pager: [phone]  Ph: [phone]

## 2021-11-18 NOTE — PLAN OF CARE
Ivaon continues with lower back pain.  Declined po Dilaudid and wanted IV.  MD increased frequency on IV dosing.  Up in room and washed up.  Eating well.  O2 2L with Sats 95-97%.  RA sat is = 81% with activity.  Cards consult done regarding need for heart cath vs pt discharge to home.  If pt stays will need her IVAD needle changed.

## 2021-11-18 NOTE — PROGRESS NOTES
Oxygen Documentation:   I certify that this patient, Jennifer Cervantes has been under my care (or a nurse practitioner or physican's assistant working with me). This is the face-to-face encounter for oxygen medical necessity.      Jennifer Cervantes is now in a chronic stable state and continues to require supplemental oxygen. Patient has continued oxygen desaturation due to Pulmonary Embolism Chronic I27.82.    Alternative treatment(s) tried or considered and deemed clinically infective for treatment of Pulmonary Embolism Chronic I27.82 include nebulizers and anticoagulation.   If portability is ordered, is the patient mobile within the home? yes    **Patients who qualify for home O2 coverage under the CMS guidelines require ABG tests or O2 sat readings obtained closest to, but no earlier than 2 days prior to the discharge, as evidence of the need for home oxygen therapy. Testing must be performed while patient is in the chronic stable state. See notes for O2 sats.**      Brandyn Lange MD (Sally)  Internal Medicine/Pediatrics  Hospitalist

## 2021-11-18 NOTE — DISCHARGE INSTRUCTIONS
Oxygen Provider:  Arranged through [facility] [employer], contact number [phone].  If you have any questions or concerns please call the oxygen company directly.

## 2021-11-18 NOTE — PLAN OF CARE
[id number]:  OVSS on 2L O2 NC. Reports 8/10 pain in back, IV Dilaudid given x1 & PO Dilaudid given x1. Attempt to wean O2 unsuccessful, desats to 80's on less than 2L O2. No acute events overnight. Plan for potential discharge today.

## 2021-11-19 LAB
BACTERIA BLD CULT: NO GROWTH
BACTERIA BLD CULT: NO GROWTH
ERYTHROCYTE [DISTWIDTH] IN BLOOD BY AUTOMATED COUNT: 20.2 % (ref 10–15)
HCT VFR BLD AUTO: 18.7 % (ref 35–47)
HGB BLD-MCNC: 7 G/DL (ref 11.7–15.7)
INR PPP: 2.84 (ref 0.85–1.15)
MCH RBC QN AUTO: 32.3 PG (ref 26.5–33)
MCHC RBC AUTO-ENTMCNC: 37.4 G/DL (ref 31.5–36.5)
MCV RBC AUTO: 86 FL (ref 78–100)
PLATELET # BLD AUTO: 289 10E3/UL (ref 150–450)
POTASSIUM BLD-SCNC: 4 MMOL/L (ref 3.4–5.3)
RBC # BLD AUTO: 2.17 10E6/UL (ref 3.8–5.2)
WBC # BLD AUTO: 10.6 10E3/UL (ref 4–11)

## 2021-11-19 PROCEDURE — 99233 SBSQ HOSP IP/OBS HIGH 50: CPT | Performed by: STUDENT IN AN ORGANIZED HEALTH CARE EDUCATION/TRAINING PROGRAM

## 2021-11-19 PROCEDURE — 250N000013 HC RX MED GY IP 250 OP 250 PS 637: Performed by: INTERNAL MEDICINE

## 2021-11-19 PROCEDURE — 36591 DRAW BLOOD OFF VENOUS DEVICE: CPT | Performed by: INTERNAL MEDICINE

## 2021-11-19 PROCEDURE — 250N000011 HC RX IP 250 OP 636: Performed by: STUDENT IN AN ORGANIZED HEALTH CARE EDUCATION/TRAINING PROGRAM

## 2021-11-19 PROCEDURE — 250N000013 HC RX MED GY IP 250 OP 250 PS 637: Performed by: STUDENT IN AN ORGANIZED HEALTH CARE EDUCATION/TRAINING PROGRAM

## 2021-11-19 PROCEDURE — 120N000002 HC R&B MED SURG/OB UMMC

## 2021-11-19 PROCEDURE — 250N000013 HC RX MED GY IP 250 OP 250 PS 637: Performed by: EMERGENCY MEDICINE

## 2021-11-19 PROCEDURE — 250N000013 HC RX MED GY IP 250 OP 250 PS 637

## 2021-11-19 PROCEDURE — 85027 COMPLETE CBC AUTOMATED: CPT | Performed by: STUDENT IN AN ORGANIZED HEALTH CARE EDUCATION/TRAINING PROGRAM

## 2021-11-19 PROCEDURE — 99232 SBSQ HOSP IP/OBS MODERATE 35: CPT | Performed by: PHYSICIAN ASSISTANT

## 2021-11-19 PROCEDURE — 85610 PROTHROMBIN TIME: CPT | Performed by: INTERNAL MEDICINE

## 2021-11-19 PROCEDURE — 84132 ASSAY OF SERUM POTASSIUM: CPT | Performed by: STUDENT IN AN ORGANIZED HEALTH CARE EDUCATION/TRAINING PROGRAM

## 2021-11-19 RX ORDER — WARFARIN SODIUM 7.5 MG/1
7.5 TABLET ORAL
Status: COMPLETED | OUTPATIENT
Start: 2021-11-19 | End: 2021-11-19

## 2021-11-19 RX ADMIN — PANTOPRAZOLE SODIUM 40 MG: 40 TABLET, DELAYED RELEASE ORAL at 08:01

## 2021-11-19 RX ADMIN — ACETAMINOPHEN 975 MG: 325 TABLET, FILM COATED ORAL at 02:05

## 2021-11-19 RX ADMIN — SODIUM CHLORIDE, PRESERVATIVE FREE 5 ML: 5 INJECTION INTRAVENOUS at 08:11

## 2021-11-19 RX ADMIN — HYDROMORPHONE HYDROCHLORIDE 6 MG: 4 TABLET ORAL at 17:00

## 2021-11-19 RX ADMIN — HYDROMORPHONE HYDROCHLORIDE 6 MG: 4 TABLET ORAL at 12:19

## 2021-11-19 RX ADMIN — HYDROMORPHONE HYDROCHLORIDE 6 MG: 4 TABLET ORAL at 09:05

## 2021-11-19 RX ADMIN — HYDROMORPHONE HYDROCHLORIDE 6 MG: 4 TABLET ORAL at 06:06

## 2021-11-19 RX ADMIN — HYDROMORPHONE HYDROCHLORIDE 0.5 MG: 1 INJECTION, SOLUTION INTRAMUSCULAR; INTRAVENOUS; SUBCUTANEOUS at 02:06

## 2021-11-19 RX ADMIN — HYDROXYUREA 2000 MG: 500 CAPSULE ORAL at 08:01

## 2021-11-19 RX ADMIN — HYDROMORPHONE HYDROCHLORIDE 0.5 MG: 1 INJECTION, SOLUTION INTRAMUSCULAR; INTRAVENOUS; SUBCUTANEOUS at 19:46

## 2021-11-19 RX ADMIN — ACETAMINOPHEN 975 MG: 325 TABLET, FILM COATED ORAL at 09:05

## 2021-11-19 RX ADMIN — CEFDINIR 300 MG: 300 CAPSULE ORAL at 08:00

## 2021-11-19 RX ADMIN — HYDROMORPHONE HYDROCHLORIDE 6 MG: 4 TABLET ORAL at 20:47

## 2021-11-19 RX ADMIN — WARFARIN SODIUM 7.5 MG: 7.5 TABLET ORAL at 17:59

## 2021-11-19 RX ADMIN — DEFERASIROX 1440 MG: 360 TABLET ORAL at 20:47

## 2021-11-19 RX ADMIN — CEFDINIR 300 MG: 300 CAPSULE ORAL at 20:47

## 2021-11-19 RX ADMIN — DULOXETINE 30 MG: 30 CAPSULE, DELAYED RELEASE ORAL at 08:01

## 2021-11-19 RX ADMIN — DULOXETINE 30 MG: 30 CAPSULE, DELAYED RELEASE ORAL at 20:47

## 2021-11-19 RX ADMIN — HYDROMORPHONE HYDROCHLORIDE 0.5 MG: 1 INJECTION, SOLUTION INTRAMUSCULAR; INTRAVENOUS; SUBCUTANEOUS at 15:44

## 2021-11-19 RX ADMIN — HYDROMORPHONE HYDROCHLORIDE 6 MG: 4 TABLET ORAL at 00:16

## 2021-11-19 RX ADMIN — HYDROMORPHONE HYDROCHLORIDE 0.5 MG: 1 INJECTION, SOLUTION INTRAMUSCULAR; INTRAVENOUS; SUBCUTANEOUS at 11:37

## 2021-11-19 RX ADMIN — MORPHINE SULFATE 15 MG: 15 TABLET, EXTENDED RELEASE ORAL at 10:14

## 2021-11-19 RX ADMIN — MORPHINE SULFATE 15 MG: 15 TABLET, EXTENDED RELEASE ORAL at 22:19

## 2021-11-19 RX ADMIN — ARIPIPRAZOLE 2 MG: 2 TABLET ORAL at 08:00

## 2021-11-19 ASSESSMENT — ACTIVITIES OF DAILY LIVING (ADL)
ADLS_ACUITY_SCORE: 7

## 2021-11-19 ASSESSMENT — MIFFLIN-ST. JEOR: SCORE: 1504.78

## 2021-11-19 NOTE — PROGRESS NOTES
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         # Acute hypoxic respiratory failure, on 2L  # Acute on chronic recurrent pulmonary embolism  Diagnosed with PE on 2/1/21 started on rivaroxaban . Switched to apixaban on  3/25/21 with RUE DVT. Developed worsening of PE requiring admission from 4/26/21-5/11/21  in setting of low Apixaban levels. Admitted again From  7/13/21-7/25/21 for acute on chronic PE, switched to dabigitran + ASA.  Claims that she is taking the medicines properly at this time.  Presented with worsening of SOB of 1 month.  No chest pain. NM perfusion scan on 11/10/21 showed  at least 3 new perfusion defects In the background of chronic perfusion defects which are suspicion for acute on chronic PE. These developed in the setting of dabigatran and ASA which is concerning for anticoagulation failure.   - Appreciate hematology input. Started coumadin and bridge with high intensity heparin. APS workup negative. INR therapeutic, off hep gtt now.  - Will discharge on 2L O2  - d/w hematology - considered possibility of CTEPH. Cards consulted, RHC done 11/18, no pulmonary hypertension, normal pulmonary pressures.    - encourage IS  - will need repeat O2 walk test if  not discharged by 11/19     #Left arm pain, diffuse joint pain all over body, no evidence of acute chest syndrome, Acute pain crisis with known h/o Sickle Cell Disease   Hgb at baseline, CXR w opacities likely pulmonary edema, improved on 11/17 CXR.  - Continue PTA MS contin 15mg CR BID  - Switch from Oxycodone 20mg PO  Q4 hours PRN to dilaudid oral 4-6 mg q3h PRN  - IV Dilaudid 0.5 mg q4H PRN  - was started on IV CTX and azithromycin. Finished 3 days of azithro 500 mg x 3 days. Switch IV CTX to PO cefdinir to complete total 7 days.     # R forearm swelling  DVT US negative 11/16.      #sickle cell anemia  #Iron overload 2/2blood transfusion  - Continue PTA Hydrea 2000mg/daily  - Continue PTA [id number]/day     # Bronchial asthma  - Continue PTA  albuterol  - Continue PTA Symbicort     #Depression,  - Continue PTA duloxetine 30mg/day and abilify     #Hx of CVA        Diet: Combination Diet Regular Diet Adult    DVT Prophylaxis: Warfarin  Olpez Catheter: Not present  Central Lines: None  Code Status: Full Code       Disposition Plan   Expected discharge: 11/19/2021   recommended to prior living arrangement once O2 stable on 2L and pain controlled.   The patient's care was discussed with the Bedside Nurse, Patient and hematology Consultant.     Brandyn Lange MD (Sally)  Internal Medicine/Pediatrics  Hospitalist     Hospitalist Service, [address]  Securely message with the Vocera Web Console (learn more here)  Text page via [email or web address] Paging/Directory     Please see sign in/sign out for up to date coverage information       ______________________________________________________________________    Interval History   No acute events overnight. No sob, no n/v, eating ok. Frustration this morning with consultants but was able to discuss after a bit, working on pain management, will need to go home on some supplemental O2.     Data reviewed today: I reviewed all  medications, new labs and imaging results over the last 24 hours. I personally reviewed no images or EKG's today.    Physical Exam   Vital Signs: Temp: 98.8  F (37.1  C) Temp src: Oral BP: 136/74 Pulse: 110   Resp: 16 SpO2: 96 % O2 Device: Nasal cannula Oxygen Delivery: 2 LPM  Weight: 168 lbs 3.2 oz    General: awake, alert, in no acute distress  HEENT: NCAT, sclera anicteric, no nasal discharge, MMM  CV: RRR, no murmurs noted  Resp: CTAB, no wheezing, no crackles, no increased WOB, on 2L NC  Abd: Soft, nontender, nondistended, +BS, no rebound or guarding  MSK: No peripheral edema, extremities warm and well perfused, normal pulses  Skin: warm, dry, no jaundice  Neuro: No focal deficits      Data   Results for orders placed or performed during the hospital encounter of 11/10/21 (from the past 24 hour(s))   INR   Result Value Ref Range    INR 2.82 (H) 0.85 - 1.15   Potassium   Result Value Ref Range    Potassium 4.0 3.4 - 5.3 mmol/L   Heparin Unfractionated Anti Xa Level   Result Value Ref Range    Anti Xa Unfractionated Heparin 0.29 For Reference Range, See Comment IU/mL    Narrative    Therapeutic Range: UFH: 0.25-0.50 IU/mL for low intensity dosing,  0.30-0.70 IU/mL for high intensity dosing DVT and PE.  This test is not validated for other direct factor X inhibitors (e.g. rivaroxaban, apixaban, edoxaban, betrixaban, fondaparinux) and should not be used for monitoring of other medications.   CBC with platelets   Result Value Ref Range    WBC Count 10.4 4.0 - 11.0 10e3/uL    RBC Count 2.26 (L) 3.80 - 5.20 10e6/uL    Hemoglobin 7.5 (L) 11.7 - 15.7 g/dL    Hematocrit 19.3 (L) 35.0 - 47.0 %    MCV 85 78 - 100 fL    MCH 33.2 (H) 26.5 - 33.0 pg    MCHC 38.9 (H) 31.5 - 36.5 g/dL    RDW 20.8 (H) 10.0 - 15.0 %    Platelet Count 328 150 - 450 10e3/uL   Blood gas venous with oxyhemoglobin POCT   Result Value Ref Range    pH Venous POCT 7.37 7.32 - 7.43    pCO2 Venous POCT 42 40 - 50 mm Hg    pO2 Venous POCT 43 25 - 47 mm Hg     Bicarbonate Venous POCT 24 21 - 28 mmol/L    Oxyhemoglobin Venous POCT 57 (L) 92 - 100 %    FIO2 POCT 40.0 %    Base Excess/Deficit (+/-) POCT -0.9 -8.1 - 1.9 mmol/L   Potassium whole blood POCT   Result Value Ref Range    Potassium POCT 4.0 3.5 - 5.0 mmol/L   Glucose whole blood POCT   Result Value Ref Range    Glucose Whole Blood POCT 98 70 - 99 mg/dL   Lactic acid, whole blood POCT   Result Value Ref Range    Lactic Acid POCT 1.0 <=2.0 mmol/L   Ionized calcium, whole blood POCT   Result Value Ref Range    Calcium, Ionized Whole Blood POCT 4.6 4.4 - 5.2 mg/dL   Hemoglobin POCT   Result Value Ref Range    Hemoglobin POCT 7.2 (L) 11.7 - 15.7 g/dL   Sodium Whole Blood POCT   Result Value Ref Range    Sodium POCT 142 133 - 144 mmol/L   Hemoglobin POCT   Result Value Ref Range    Hemoglobin POCT 8.5 (L) 11.7 - 15.7 g/dL   Oxyhemoglobin, venous POCT   Result Value Ref Range    Oxyhemoglobin Venous POCT 90 (L) 92 - 100 %   Hemoglobin POCT   Result Value Ref Range    Hemoglobin POCT 7.5 (L) 11.7 - 15.7 g/dL   Oxyhemoglobin, venous POCT   Result Value Ref Range    Oxyhemoglobin Venous POCT 53 (L) 92 - 100 %   Cardiac Catheterization    Narrative      Normal cardiac output.    Normal PA pressures.    Right sided filling pressures are normal.    Left sided filling pressures are normal.        *Note: Due to a large number of results and/or encounters for the requested time period, some results have not been displayed. A complete set of results can be found in Results Review.

## 2021-11-19 NOTE — PLAN OF CARE
[id number]: VSS, no c/o sob or dizzyness. Satting mid 90's on 3L, weaning to 1-2L failed. Heart cath complete, [facility] site WDL, see note. Continues to have lower back pain, PRN IV and PO dilaudid given x1 w/some relief. Port needle changed, continue w/plan of care.

## 2021-11-19 NOTE — PROGRESS NOTES
This is a recent snapshot of the patient's [facility] medical record.  For current drug dose and complete information and questions, call [phone] or In Basket pool, fv home infusion ([id number])  CSN Number:  [id number]

## 2021-11-19 NOTE — PROGRESS NOTES
11:29am- I called unit 7D to confirm whether patient is going home today. Spoke to nurse Alesha, she said they still anticipate patient to discharge today. She will remind patient and her nurse to call [facility] when she is on her way home.   3:10pm- I called 7D because notes indicate that patient will not discharge today. I spoke to patient's nurse Luisana, patient will not be discharging. Went through process of what will be needed if patient still needs O2 at discharge, and how to call in order on the weekend.

## 2021-11-19 NOTE — PROGRESS NOTES
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  Negative APS testing.   Had CXR showing infiltrates/cf infection on 11/12 and started on abx.  She states pain is not controlled but she feels she will be ready for discharge later today and we are in agreement for discharge on home o2 with chronic home pain meds.     Recommendations:   -Continue warfarin for PE.  Referral placed for outpatient coagulation clinic  -Ok to discharge today if pain controlled   -From hematology standpoint ok to send home with o2  -No need to transfuse pRBC prior to discharge with hgb 7 today   -I sent message to hematology team (Dr. Duncan and Andrei) to schedule follow-up    Plan of care was discussed with attending physician Dr. Hernandez but pt seen and examined only by me     We will continue to follow this patient. Please don't hesitate to contact the Fellow On-Call with questions.    Cherelle Brock PA-C  [facility]  [phone]    ___________________________________________________________________  Subjective & Interval History:    No acute events noted overnight.  Still requiring O2.   She reports pain is not fully controlled but she has just gotten a dose of  "medication and thinks she will be ready to go this afternoon.  She states her pain is currently at an 8 and she would like it to be at a 6 to go home.   She denies any new issues or concerns.     Current pain plan:  -Lidocaine infusion (written 11/14 but pt refused)   -MS contin 15 mg PO Q 12 hours   -dilaudid 6 mg PO q3 hours PRN   -dilaudid 0.5 mg IV q 8 hours PRN (added 11/15), increased to 0.5 mg q 4 hours PRN on 11/18    Physical Exam:    /61 (BP Location: Left arm)   Pulse 102   Temp 97.4  F (36.3  C) (Oral)   Resp 18   Ht 1.626 m (5' 4\")   Wt 76 kg (167 lb 8 oz)   SpO2 94%   BMI 28.75 kg/m    Gen: Well appearing, in NAD.  Seen lying in hospital bed.   Pulm: Lungs clear to auscultation b/l.  On 2LPM via oxymask  Cardiovascular: RRR, no gallops, murmurs, rubs     Labs & Studies: I personally reviewed the following studies:  ROUTINE LABS (Last four results):  CMP  Recent Labs   Lab 11/19/21  0615 11/18/21  1509 11/18/21  0603 11/17/21  0609 11/16/21  0650 11/15/21  0603 11/14/21  0625 11/13/21  0524   NA  --  142  --   --  140  --  138 139   POTASSIUM 4.0 4.0 4.0 3.6 3.7   < > 4.0 4.6   CHLORIDE  --   --   --   --  109  --  110* 110*   CO2  --   --   --   --  26  --  24 23   ANIONGAP  --   --   --   --  5  --  4 6   GLC  --  98  --   --  101*  --  111* 102*   BUN  --   --   --   --  11  --  9 10   CR  --   --   --   --  0.50*  --  0.54 0.52   GFRESTIMATED  --   --   --   --  >90  --  >90 >90   MICAH  --   --   --   --  8.2*  --  8.8 8.5    < > = values in this interval not displayed.     CBC  Recent Labs   Lab 11/19/21  0615 11/18/21  1520 11/18/21  1519 11/18/21  1509 11/18/21  0603 11/17/21  0609 11/16/21  0650   WBC 10.6  --   --   --  10.4 9.2 9.0   RBC 2.17*  --   --   --  2.26* 2.24* 2.31*   HGB 7.0* 7.5* 8.5* 7.2* 7.5* 7.2* 7.4*   HCT 18.7*  --   --   --  19.3* 19.6* 20.2*   MCV 86  --   --   --  85 88 87   MCH 32.3  --   --   --  33.2* 32.1 32.0   MCHC 37.4*  --   --   --  38.9* 36.7* 36.6* "   RDW 20.2*  --   --   --  20.8* 21.2* 21.0*     --   --   --  328 344 352     INR  Recent Labs   Lab 11/19/21  0615 11/18/21  0603 11/17/21  0609 11/16/21  0650   INR 2.84* 2.82* 2.26* 2.00*       Medications list for reference:  Current Facility-Administered Medications   Medication     acetaminophen (TYLENOL) tablet 650 mg     acetaminophen (TYLENOL) tablet 975 mg     albuterol (PROAIR HFA/PROVENTIL HFA/VENTOLIN HFA) 108 (90 Base) MCG/ACT inhaler 2 puff     albuterol (PROVENTIL) neb solution 2.5 mg     ARIPiprazole (ABILIFY) tablet 2 mg     cefdinir (OMNICEF) capsule 300 mg     deferasirox (JADENU) tablet 1,440 mg     diphenhydrAMINE (BENADRYL) capsule 25-50 mg     DULoxetine (CYMBALTA) DR capsule 30 mg     fluticasone-vilanterol (BREO ELLIPTA) 200-25 MCG/INH inhaler 1 puff     heparin 100 UNIT/ML injection 5-10 mL     heparin lock flush 10 UNIT/ML injection 5-10 mL     HYDROmorphone (DILAUDID) tablet 6 mg     HYDROmorphone (PF) (DILAUDID) injection 0.5 mg     hydroxyurea (HYDREA) capsule 2,000 mg     hydrOXYzine (ATARAX) tablet 25 mg     lidocaine 1 % 0.1-1 mL     melatonin tablet 3 mg     morphine (MS CONTIN) 12 hr tablet 15 mg     naloxone (NARCAN) injection 0.2 mg    Or     naloxone (NARCAN) injection 0.4 mg    Or     naloxone (NARCAN) injection 0.2 mg    Or     naloxone (NARCAN) injection 0.4 mg     ondansetron (ZOFRAN) tablet 8 mg     pantoprazole (PROTONIX) EC tablet 40 mg     Patient is already receiving anticoagulation with heparin, enoxaparin (LOVENOX), warfarin (COUMADIN)  or other anticoagulant medication     senna-docusate (SENOKOT-S/PERICOLACE) 8.6-50 MG per tablet 1 tablet    Or     senna-docusate (SENOKOT-S/PERICOLACE) 8.6-50 MG per tablet 2 tablet     sodium chloride (PF) 0.9% PF flush 10-20 mL     sodium chloride (PF) 0.9% PF flush 3 mL     sodium chloride (PF) 0.9% PF flush 3 mL     warfarin ANTICOAGULANT (COUMADIN) tablet 7.5 mg     Warfarin Therapy Reminder (Check START DATE - warfarin  may be starting in the FUTURE)

## 2021-11-19 NOTE — PLAN OF CARE
0963-5000: VSS, pt afebrile on RA. Pt not feeling pain managed adequatly to discharge home today. PRN Po Diladid given x3 and IV dilaudid X2. Pt to possibly discharge tomorrow pending adequate pain management. Pt on 2L NC sating 94-96%. Pt desats to 88% when removing O2. Pt will need new O2 Walk test prior to discharge since current qualification expires today (11/19). Pt has portable home O2 at bedside. If pt qualifies will need to contact  home medical supplies to set up arrangement of home medical supplies prior to discharge. Pt up with SBA. Voiding spontaneously with good UOP. Pt denies constipation, LBM 11/17. Pt having fair appetite, does not like the hospital food. Had 8 ice cream cups. Continue POC.

## 2021-11-19 NOTE — PLAN OF CARE
9745-3264    VSS on O2 2L via nasal cannula. Continuous pulse ox monitoring maintained. Denies SOB and nausea. C/o pain, PO dilaudid x2 and IV dilaudid x1 given with some relief. Up to bedside commode. No acute events overnight. Continue with plan of care.

## 2021-11-20 ENCOUNTER — APPOINTMENT (OUTPATIENT)
Dept: ULTRASOUND IMAGING | Facility: CLINIC | Age: 21
DRG: 175 | End: 2021-11-20
Attending: STUDENT IN AN ORGANIZED HEALTH CARE EDUCATION/TRAINING PROGRAM
Payer: COMMERCIAL

## 2021-11-20 LAB
ABO/RH(D): NORMAL
ANTIBODY SCREEN: NEGATIVE
BLD PROD TYP BPU: NORMAL
BLOOD COMPONENT TYPE: NORMAL
CODING SYSTEM: NORMAL
CROSSMATCH: NORMAL
ERYTHROCYTE [DISTWIDTH] IN BLOOD BY AUTOMATED COUNT: 20.7 % (ref 10–15)
HCT VFR BLD AUTO: 17.1 % (ref 35–47)
HGB BLD-MCNC: 6.4 G/DL (ref 11.7–15.7)
HOLD SPECIMEN: NORMAL
INR PPP: 3.61 (ref 0.85–1.15)
ISSUE DATE AND TIME: NORMAL
LACTATE SERPL-SCNC: 0.9 MMOL/L (ref 0.7–2)
MCH RBC QN AUTO: 31.4 PG (ref 26.5–33)
MCHC RBC AUTO-ENTMCNC: 37.4 G/DL (ref 31.5–36.5)
MCV RBC AUTO: 84 FL (ref 78–100)
PLATELET # BLD AUTO: 242 10E3/UL (ref 150–450)
RBC # BLD AUTO: 2.04 10E6/UL (ref 3.8–5.2)
SPECIMEN EXPIRATION DATE: NORMAL
UNIT ABO/RH: NORMAL
UNIT NUMBER: NORMAL
UNIT STATUS: NORMAL
UNIT TYPE ISBT: 5100
WBC # BLD AUTO: 14.2 10E3/UL (ref 4–11)

## 2021-11-20 PROCEDURE — 250N000013 HC RX MED GY IP 250 OP 250 PS 637: Performed by: INTERNAL MEDICINE

## 2021-11-20 PROCEDURE — 85610 PROTHROMBIN TIME: CPT | Performed by: INTERNAL MEDICINE

## 2021-11-20 PROCEDURE — 76536 US EXAM OF HEAD AND NECK: CPT

## 2021-11-20 PROCEDURE — 99233 SBSQ HOSP IP/OBS HIGH 50: CPT | Performed by: STUDENT IN AN ORGANIZED HEALTH CARE EDUCATION/TRAINING PROGRAM

## 2021-11-20 PROCEDURE — 86900 BLOOD TYPING SEROLOGIC ABO: CPT | Performed by: STUDENT IN AN ORGANIZED HEALTH CARE EDUCATION/TRAINING PROGRAM

## 2021-11-20 PROCEDURE — 36591 DRAW BLOOD OFF VENOUS DEVICE: CPT | Performed by: INTERNAL MEDICINE

## 2021-11-20 PROCEDURE — 250N000011 HC RX IP 250 OP 636: Performed by: HOSPITALIST

## 2021-11-20 PROCEDURE — 86923 COMPATIBILITY TEST ELECTRIC: CPT | Performed by: STUDENT IN AN ORGANIZED HEALTH CARE EDUCATION/TRAINING PROGRAM

## 2021-11-20 PROCEDURE — 120N000002 HC R&B MED SURG/OB UMMC

## 2021-11-20 PROCEDURE — 83605 ASSAY OF LACTIC ACID: CPT | Performed by: INTERNAL MEDICINE

## 2021-11-20 PROCEDURE — 76536 US EXAM OF HEAD AND NECK: CPT | Mod: 26 | Performed by: STUDENT IN AN ORGANIZED HEALTH CARE EDUCATION/TRAINING PROGRAM

## 2021-11-20 PROCEDURE — 85027 COMPLETE CBC AUTOMATED: CPT | Performed by: STUDENT IN AN ORGANIZED HEALTH CARE EDUCATION/TRAINING PROGRAM

## 2021-11-20 PROCEDURE — 250N000011 HC RX IP 250 OP 636: Performed by: STUDENT IN AN ORGANIZED HEALTH CARE EDUCATION/TRAINING PROGRAM

## 2021-11-20 PROCEDURE — 250N000013 HC RX MED GY IP 250 OP 250 PS 637: Performed by: EMERGENCY MEDICINE

## 2021-11-20 PROCEDURE — 250N000013 HC RX MED GY IP 250 OP 250 PS 637: Performed by: STUDENT IN AN ORGANIZED HEALTH CARE EDUCATION/TRAINING PROGRAM

## 2021-11-20 PROCEDURE — 250N000013 HC RX MED GY IP 250 OP 250 PS 637

## 2021-11-20 PROCEDURE — P9016 RBC LEUKOCYTES REDUCED: HCPCS | Performed by: STUDENT IN AN ORGANIZED HEALTH CARE EDUCATION/TRAINING PROGRAM

## 2021-11-20 RX ORDER — HYDROMORPHONE HYDROCHLORIDE 1 MG/ML
0.5 INJECTION, SOLUTION INTRAMUSCULAR; INTRAVENOUS; SUBCUTANEOUS
Status: DISCONTINUED | OUTPATIENT
Start: 2021-11-20 | End: 2021-11-21 | Stop reason: HOSPADM

## 2021-11-20 RX ORDER — WARFARIN SODIUM 3 MG/1
3 TABLET ORAL
Status: COMPLETED | OUTPATIENT
Start: 2021-11-20 | End: 2021-11-20

## 2021-11-20 RX ADMIN — MORPHINE SULFATE 15 MG: 15 TABLET, EXTENDED RELEASE ORAL at 22:30

## 2021-11-20 RX ADMIN — HYDROXYUREA 2000 MG: 500 CAPSULE ORAL at 08:56

## 2021-11-20 RX ADMIN — SODIUM CHLORIDE, PRESERVATIVE FREE 5 ML: 5 INJECTION INTRAVENOUS at 07:23

## 2021-11-20 RX ADMIN — WARFARIN SODIUM 3 MG: 3 TABLET ORAL at 20:43

## 2021-11-20 RX ADMIN — ACETAMINOPHEN 975 MG: 325 TABLET, FILM COATED ORAL at 00:34

## 2021-11-20 RX ADMIN — MICONAZOLE NITRATE: 20 POWDER TOPICAL at 20:48

## 2021-11-20 RX ADMIN — HYDROMORPHONE HYDROCHLORIDE 6 MG: 4 TABLET ORAL at 20:48

## 2021-11-20 RX ADMIN — DEFERASIROX 1440 MG: 360 TABLET ORAL at 20:43

## 2021-11-20 RX ADMIN — ACETAMINOPHEN 975 MG: 325 TABLET, FILM COATED ORAL at 08:56

## 2021-11-20 RX ADMIN — DULOXETINE 30 MG: 30 CAPSULE, DELAYED RELEASE ORAL at 08:56

## 2021-11-20 RX ADMIN — HYDROMORPHONE HYDROCHLORIDE 0.5 MG: 1 INJECTION, SOLUTION INTRAMUSCULAR; INTRAVENOUS; SUBCUTANEOUS at 23:56

## 2021-11-20 RX ADMIN — HYDROMORPHONE HYDROCHLORIDE 0.5 MG: 1 INJECTION, SOLUTION INTRAMUSCULAR; INTRAVENOUS; SUBCUTANEOUS at 05:27

## 2021-11-20 RX ADMIN — MORPHINE SULFATE 15 MG: 15 TABLET, EXTENDED RELEASE ORAL at 09:32

## 2021-11-20 RX ADMIN — HYDROMORPHONE HYDROCHLORIDE 6 MG: 4 TABLET ORAL at 11:32

## 2021-11-20 RX ADMIN — HYDROMORPHONE HYDROCHLORIDE 0.5 MG: 1 INJECTION, SOLUTION INTRAMUSCULAR; INTRAVENOUS; SUBCUTANEOUS at 13:48

## 2021-11-20 RX ADMIN — HYDROMORPHONE HYDROCHLORIDE 0.5 MG: 1 INJECTION, SOLUTION INTRAMUSCULAR; INTRAVENOUS; SUBCUTANEOUS at 00:34

## 2021-11-20 RX ADMIN — HYDROMORPHONE HYDROCHLORIDE 6 MG: 4 TABLET ORAL at 16:09

## 2021-11-20 RX ADMIN — DULOXETINE 30 MG: 30 CAPSULE, DELAYED RELEASE ORAL at 20:43

## 2021-11-20 RX ADMIN — PANTOPRAZOLE SODIUM 40 MG: 40 TABLET, DELAYED RELEASE ORAL at 08:56

## 2021-11-20 RX ADMIN — HYDROMORPHONE HYDROCHLORIDE 6 MG: 4 TABLET ORAL at 02:29

## 2021-11-20 RX ADMIN — ARIPIPRAZOLE 2 MG: 2 TABLET ORAL at 08:59

## 2021-11-20 RX ADMIN — HYDROMORPHONE HYDROCHLORIDE 0.5 MG: 1 INJECTION, SOLUTION INTRAMUSCULAR; INTRAVENOUS; SUBCUTANEOUS at 09:32

## 2021-11-20 RX ADMIN — HYDROMORPHONE HYDROCHLORIDE 0.5 MG: 1 INJECTION, SOLUTION INTRAMUSCULAR; INTRAVENOUS; SUBCUTANEOUS at 19:53

## 2021-11-20 RX ADMIN — HYDROMORPHONE HYDROCHLORIDE 6 MG: 4 TABLET ORAL at 06:36

## 2021-11-20 RX ADMIN — ACETAMINOPHEN 975 MG: 325 TABLET, FILM COATED ORAL at 17:20

## 2021-11-20 ASSESSMENT — ACTIVITIES OF DAILY LIVING (ADL)
ADLS_ACUITY_SCORE: 7

## 2021-11-20 ASSESSMENT — MIFFLIN-ST. JEOR: SCORE: 1511.13

## 2021-11-20 NOTE — PLAN OF CARE
Patient has been assessed for Home Oxygen needs. Oxygen readings:    *Pulse oximetry (SpO2) = 81% on room air at rest while awake.    *SpO2 improved to 81 % no liters/minute at rest.    *SpO2 = 84% on room air during activity/with exercise.    *SpO2 improved to 92 % on 2 liters/minute during activity/with exercise.

## 2021-11-20 NOTE — PLAN OF CARE
1900- 0700     Tachy 110s BPM OVSS on 2-3 L NC. Pt reporting pain 7-9/10. PRN IV and Oral Dilaudid given throughout- heat pad applied. Last BM reported 11/18; Good UOP. Pt triggered sepsis @ 0650 d/t tachycardia and increased WBC- 14.2- Passed on to day shift. Continue POC.     Likely discharging today- portable tanks at Bedside- passed on [facility] info to day shift- info in sticky note also

## 2021-11-20 NOTE — PLAN OF CARE
9430-1638    Tachycardic, afebrile and on 2L humidified O2 via NC. A&Ox4. Denies nausea. Denies SOB at rest, but dyspnea upon exertion. C/o 10/10 pain all over, worst in back. Pain managed with scheduled MS contin and PRN IV dilaudid x1 and PO dilaudid x1. Was supposed to discharged today, although pt feels like pain is still unmanaged. US of neck complete, walk test completed, see note. Pt to receive 1 unit of RBC's today for Hgb of 6.4. Up with SBA in room. Will most likely discharge tomorrow. Continue to monitor & w/ POC.

## 2021-11-20 NOTE — PROGRESS NOTES
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      # Acute hypoxic respiratory failure, on 2L  # Acute on chronic recurrent pulmonary embolism  Diagnosed with PE on 2/1/21 started on rivaroxaban . Switched to apixaban on  3/25/21 with RUE DVT. Developed worsening of PE requiring admission from 4/26/21-5/11/21  in setting of low Apixaban levels. Admitted again From  7/13/21-7/25/21 for acute on chronic PE, switched to dabigitran + ASA.  Claims that she is taking the medicines properly at this time.  Presented with worsening of SOB of 1 month.  No chest pain. NM perfusion scan on 11/10/21 showed  at least 3 new perfusion defects In the background of chronic perfusion defects which are suspicion for acute on chronic PE. These developed in the setting of dabigatran and ASA which is concerning for anticoagulation failure.   - Appreciate hematology input. Started coumadin and bridge with high intensity heparin. APS workup negative. INR therapeutic, off hep gtt now.  - Will discharge on 2L O2  - d/w hematology - considered possibility of CTEPH. Cards consulted, RHC done 11/18, no pulmonary hypertension, normal pulmonary pressures.    - encourage IS  - will need repeat O2 walk test if not  discharged by 11/19  - anticipate warfarin 7.5 mg MWF, 5 mg other days, INR check Mon/Tues     #Left arm pain, diffuse joint pain all over body, no evidence of acute chest syndrome, Acute pain crisis with known h/o Sickle Cell Disease   Hgb at baseline, CXR w opacities likely pulmonary edema, improved on 11/17 CXR.  - Continue PTA MS contin 15mg CR BID  - Switch from Oxycodone 20mg PO  Q4 hours PRN to dilaudid oral 4-6 mg q3h PRN  - IV Dilaudid 0.5 mg q4H PRN  - was started on IV CTX and azithromycin. Finished 3 days of azithro 500 mg x 3 days. Switch IV CTX to PO cefdinir to complete total 7 days.     # R forearm swelling  DVT US negative 11/16.      #sickle cell anemia  #Iron overload 2/2blood transfusion  - Continue PTA Hydrea 2000mg/daily  - Continue PTA [id number]/day     # Bronchial asthma  - Continue PTA  albuterol  - Continue PTA Symbicort     #Depression,  - Continue PTA duloxetine 30mg/day and abilify     #Hx of CVA  Residual deficits.        Diet: Combination Diet Regular Diet Adult  Diet    DVT Prophylaxis: Warfarin  Lopez Catheter: Not present  Central Lines: None  Code Status: Full Code      Disposition Plan   Expected discharge: 11/20/2021 recommended to prior living arrangement once adequate pain management/ tolerating PO medications. She did not feel comfortable going home due to pain.      The patient's care was discussed with the Bedside Nurse and Patient.    Brandyn Lange MD (Sally)  Internal Medicine/Pediatrics  Hospitalist  Hospitalist Service, [address]  Securely message with the Vocera Web Console (learn more here)  Text page via [facility] Paging/Directory    Please see sign in/sign out for up to date coverage information    ______________________________________________________________________    Interval History   No acute events overnight. No sob, no n/v, eating ok but doesn't like our food here. Neck is sore but doing ok. Still having  8 out of 10 pain this morning, hopeful to get down to 6/10 before discharging. No chest pain.     Data reviewed today: I reviewed all medications, new labs and imaging results over the last 24 hours. I personally reviewed no images or EKG's today.    Physical Exam   Vital Signs: Temp: 97.4  F (36.3  C) Temp src: Oral BP: 115/61 Pulse: 102   Resp: 18 SpO2: 94 % O2 Device: Nasal cannula Oxygen Delivery: 2 LPM  Weight: 167 lbs 8 oz     General: awake, alert, in no acute distress  HEENT: NCAT, sclera anicteric, no nasal discharge, MMM, left side of neck where RHC was done with some bruising and tenderness but no significant swelling, full ROM  CV: RRR, no murmurs noted  Resp: CTAB, no wheezing, no crackles, no increased WOB, on 2L NC  Abd: Soft, nontender, nondistended, +BS, no rebound or guarding  MSK: No peripheral edema, extremities warm and well perfused, normal pulses  Skin: warm, dry, no jaundice  Neuro: No focal deficits      Data   Results for orders placed or performed during the hospital encounter of 11/10/21 (from the past 24 hour(s))   INR   Result Value Ref Range    INR 2.84 (H) 0.85 - 1.15   Potassium   Result Value Ref Range    Potassium 4.0 3.4 - 5.3 mmol/L   CBC with platelets   Result Value Ref Range    WBC Count 10.6 4.0 - 11.0 10e3/uL    RBC Count 2.17 (L) 3.80 - 5.20 10e6/uL    Hemoglobin 7.0 (L) 11.7 - 15.7 g/dL    Hematocrit 18.7 (L) 35.0 - 47.0 %    MCV 86 78 - 100 fL    MCH 32.3 26.5 - 33.0 pg    MCHC 37.4 (H) 31.5 - 36.5 g/dL    RDW 20.2 (H) 10.0 - 15.0 %    Platelet Count 289 150 - 450 10e3/uL     *Note: Due to a large number of results and/or encounters for the requested time period, some results have not been displayed. A complete set of results can be found in Results Review.

## 2021-11-21 VITALS
RESPIRATION RATE: 20 BRPM | DIASTOLIC BLOOD PRESSURE: 51 MMHG | BODY MASS INDEX: 28.79 KG/M2 | SYSTOLIC BLOOD PRESSURE: 125 MMHG | WEIGHT: 168.6 LBS | HEIGHT: 64 IN | HEART RATE: 100 BPM | TEMPERATURE: 98.2 F | OXYGEN SATURATION: 93 %

## 2021-11-21 PROBLEM — I27.82 CHRONIC PULMONARY EMBOLISM WITHOUT ACUTE COR PULMONALE, UNSPECIFIED PULMONARY EMBOLISM TYPE (H): Status: ACTIVE | Noted: 2021-11-21

## 2021-11-21 LAB
ERYTHROCYTE [DISTWIDTH] IN BLOOD BY AUTOMATED COUNT: 20.7 % (ref 10–15)
HCT VFR BLD AUTO: 23.9 % (ref 35–47)
HGB BLD-MCNC: 8.6 G/DL (ref 11.7–15.7)
INR PPP: 2.85 (ref 0.85–1.15)
LACTATE SERPL-SCNC: 0.6 MMOL/L (ref 0.7–2)
MCH RBC QN AUTO: 29.9 PG (ref 26.5–33)
MCHC RBC AUTO-ENTMCNC: 36 G/DL (ref 31.5–36.5)
MCV RBC AUTO: 83 FL (ref 78–100)
PLATELET # BLD AUTO: 390 10E3/UL (ref 150–450)
RBC # BLD AUTO: 2.88 10E6/UL (ref 3.8–5.2)
WBC # BLD AUTO: 13.1 10E3/UL (ref 4–11)

## 2021-11-21 PROCEDURE — 83605 ASSAY OF LACTIC ACID: CPT | Performed by: STUDENT IN AN ORGANIZED HEALTH CARE EDUCATION/TRAINING PROGRAM

## 2021-11-21 PROCEDURE — 250N000011 HC RX IP 250 OP 636: Performed by: STUDENT IN AN ORGANIZED HEALTH CARE EDUCATION/TRAINING PROGRAM

## 2021-11-21 PROCEDURE — 85610 PROTHROMBIN TIME: CPT | Performed by: INTERNAL MEDICINE

## 2021-11-21 PROCEDURE — 99239 HOSP IP/OBS DSCHRG MGMT >30: CPT | Performed by: STUDENT IN AN ORGANIZED HEALTH CARE EDUCATION/TRAINING PROGRAM

## 2021-11-21 PROCEDURE — 85027 COMPLETE CBC AUTOMATED: CPT | Performed by: STUDENT IN AN ORGANIZED HEALTH CARE EDUCATION/TRAINING PROGRAM

## 2021-11-21 PROCEDURE — 36591 DRAW BLOOD OFF VENOUS DEVICE: CPT | Performed by: INTERNAL MEDICINE

## 2021-11-21 PROCEDURE — 250N000013 HC RX MED GY IP 250 OP 250 PS 637: Performed by: INTERNAL MEDICINE

## 2021-11-21 PROCEDURE — 250N000013 HC RX MED GY IP 250 OP 250 PS 637

## 2021-11-21 PROCEDURE — 250N000011 HC RX IP 250 OP 636: Performed by: HOSPITALIST

## 2021-11-21 PROCEDURE — 250N000013 HC RX MED GY IP 250 OP 250 PS 637: Performed by: EMERGENCY MEDICINE

## 2021-11-21 RX ORDER — WARFARIN SODIUM 5 MG/1
5 TABLET ORAL
Qty: 16 TABLET | Refills: 0 | Status: SHIPPED | OUTPATIENT
Start: 2021-11-21 | End: 2022-01-25

## 2021-11-21 RX ORDER — HYDROMORPHONE HYDROCHLORIDE 2 MG/1
3-6 TABLET ORAL
Qty: 24 TABLET | Refills: 0 | Status: SHIPPED | OUTPATIENT
Start: 2021-11-21 | End: 2021-11-22

## 2021-11-21 RX ORDER — WARFARIN SODIUM 7.5 MG/1
7.5 TABLET ORAL
Qty: 12 TABLET | Refills: 0 | Status: ON HOLD | OUTPATIENT
Start: 2021-11-22 | End: 2022-01-31

## 2021-11-21 RX ORDER — WARFARIN SODIUM 5 MG/1
5 TABLET ORAL
Status: DISCONTINUED | OUTPATIENT
Start: 2021-11-21 | End: 2021-11-21 | Stop reason: HOSPADM

## 2021-11-21 RX ADMIN — PANTOPRAZOLE SODIUM 40 MG: 40 TABLET, DELAYED RELEASE ORAL at 09:32

## 2021-11-21 RX ADMIN — Medication 5 ML: at 14:53

## 2021-11-21 RX ADMIN — DULOXETINE 30 MG: 30 CAPSULE, DELAYED RELEASE ORAL at 09:32

## 2021-11-21 RX ADMIN — HYDROMORPHONE HYDROCHLORIDE 0.5 MG: 1 INJECTION, SOLUTION INTRAMUSCULAR; INTRAVENOUS; SUBCUTANEOUS at 10:29

## 2021-11-21 RX ADMIN — HYDROMORPHONE HYDROCHLORIDE 6 MG: 4 TABLET ORAL at 04:25

## 2021-11-21 RX ADMIN — HYDROMORPHONE HYDROCHLORIDE 6 MG: 4 TABLET ORAL at 00:34

## 2021-11-21 RX ADMIN — HYDROMORPHONE HYDROCHLORIDE 6 MG: 4 TABLET ORAL at 12:38

## 2021-11-21 RX ADMIN — MORPHINE SULFATE 15 MG: 15 TABLET, EXTENDED RELEASE ORAL at 10:28

## 2021-11-21 RX ADMIN — HYDROMORPHONE HYDROCHLORIDE 0.5 MG: 1 INJECTION, SOLUTION INTRAMUSCULAR; INTRAVENOUS; SUBCUTANEOUS at 07:33

## 2021-11-21 RX ADMIN — HYDROMORPHONE HYDROCHLORIDE 0.5 MG: 1 INJECTION, SOLUTION INTRAMUSCULAR; INTRAVENOUS; SUBCUTANEOUS at 13:57

## 2021-11-21 RX ADMIN — HYDROMORPHONE HYDROCHLORIDE 6 MG: 4 TABLET ORAL at 09:33

## 2021-11-21 RX ADMIN — HYDROMORPHONE HYDROCHLORIDE 0.5 MG: 1 INJECTION, SOLUTION INTRAMUSCULAR; INTRAVENOUS; SUBCUTANEOUS at 03:27

## 2021-11-21 RX ADMIN — SODIUM CHLORIDE, PRESERVATIVE FREE 5 ML: 5 INJECTION INTRAVENOUS at 07:50

## 2021-11-21 RX ADMIN — ARIPIPRAZOLE 2 MG: 2 TABLET ORAL at 09:33

## 2021-11-21 RX ADMIN — ACETAMINOPHEN 975 MG: 325 TABLET, FILM COATED ORAL at 09:32

## 2021-11-21 RX ADMIN — MICONAZOLE NITRATE: 20 POWDER TOPICAL at 09:33

## 2021-11-21 RX ADMIN — HYDROXYUREA 2000 MG: 500 CAPSULE ORAL at 09:33

## 2021-11-21 ASSESSMENT — ACTIVITIES OF DAILY LIVING (ADL)
ADLS_ACUITY_SCORE: 7

## 2021-11-21 ASSESSMENT — MIFFLIN-ST. JEOR: SCORE: 1509.76

## 2021-11-21 NOTE — PLAN OF CARE
[id number]    Tachycardia, OVSS, afebrile and on 2L humidified O2 via NC. A&Ox4. Denies nausea/SOB at rest, experiences dyspnea on exertion. C/o 8/10 back and generalized pain, managed with PO dilaudid x2 and IV dilaudid x3. Slept in between cares. Up with SBA. Adequate for discharge, called and left voicemail with [facility] to arrange for home supplies to be delivered.     Discharge  D: Orders for discharge and outpatient medications written.  I: Home medications and return to clinic schedule reviewed with patient. Discharge instructions and parameters for calling Health Care Provider reviewed. Patient left at 1500 with belonging. Leaving via Uber.   A: Patient/family verbalized understanding and was ready for discharge.   P: Patients discharge medications brought to room. Patient educated to separate doses of PO dilaudid and oxycodone for safety. Follow up as scheduled on Nov. 22 for Hearing Evaluation at [facility].

## 2021-11-21 NOTE — PROGRESS NOTES
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      # Acute hypoxic respiratory failure, on 2L  # Acute on chronic recurrent pulmonary embolism  Diagnosed with PE on 2/1/21 started on rivaroxaban . Switched to apixaban on  3/25/21 with RUE DVT. Developed worsening of PE requiring admission from 4/26/21-5/11/21  in setting of low Apixaban levels. Admitted again From  7/13/21-7/25/21 for acute on chronic PE, switched to dabigitran + ASA.  Claims that she is taking the medicines properly at this time.  Presented with worsening of SOB of 1 month.  No chest pain. NM perfusion scan on 11/10/21 showed  at least 3 new perfusion defects In the background of chronic perfusion defects which are suspicion for acute on chronic PE. These developed in the setting of dabigatran and ASA which is concerning for anticoagulation failure.   - Appreciate hematology input. Started coumadin and bridge with high intensity heparin. APS workup negative. INR therapeutic, off hep gtt now.  - Will discharge on 2L O2  - d/w hematology - considered possibility of CTEPH. Cards consulted, RHC done 11/18, no pulmonary hypertension, normal pulmonary pressures.  US neck done on 11/20 due to pain/swelling in L side where  cath was done, no hematoma or other concerning findings.   - encourage IS  - anticipate warfarin 7.5 mg MWF, 5 mg other days, INR check Mon/Tues - will d/w pharm given supratherapeutic on 11/20     #Left arm pain, diffuse joint pain all over body, no evidence of acute chest syndrome, Acute pain crisis with known h/o Sickle Cell Disease  # Chronic pain syndrome   Hgb at baseline, CXR w opacities likely pulmonary edema, improved on 11/17 CXR.  - Continue PTA MS contin 15mg CR BID  - Switch from Oxycodone 20mg PO  Q4 hours PRN to dilaudid oral 4-6 mg q3h PRN  - IV Dilaudid 0.5 mg q4H PRN  - d/w hematology, pt not likely to be in acute pain crisis at this time  - was started on IV CTX and azithromycin. Finished 3 days of azithro 500 mg x 3 days. Switch IV CTX to PO cefdinir to complete total 7 days.     # R forearm swelling  DVT US negative 11/16.      #sickle cell anemia  #Iron overload 2/2 blood transfusion  - Continue PTA Hydrea 2000mg/daily  - Continue PTA [id number]/day  - hgb 6.4. d/w hematology - rec no blood transfusion given iron overload unless symptomatic, but OK to give if needed; pt reports lightheadedness and seeing spots in her vision with standing for some time now, she 'knows she needs blood' but states we have been refusing to give it to her. We discussed iron overload and getting blood here vs getting recheck in clinic and getting transfused then if needed. She wants to get blood now. 1 unit ordered.      # Bronchial asthma  - Continue PTA  albuterol  - Continue PTA Symbicort     #Depression  - Continue PTA duloxetine 30mg/day and abilify     #Hx of CVA  Residual deficits.     Diet: Combination Diet Regular Diet Adult  Diet    DVT Prophylaxis: Warfarin  Lopez Catheter: Not present  Central Lines: None  Code Status: Full Code        Diet: Combination Diet Regular Diet Adult  Diet    DVT Prophylaxis: Warfarin  Lopez Catheter: Not present  Central Lines: None  Code Status: Full Code      Disposition  Plan   Expected discharge: 11/21/2021   recommended to prior living arrangement once adequate pain management/ tolerating PO medications. She did not feel comfortable going home due to uncontrolled pain.     The patient's care was discussed with the Bedside Nurse, Patient and heme Consultant.    Brandyn Lange MD (Sally)  Internal Medicine/Pediatrics  Hospitalist  Hospitalist Service, [address]  Securely message with the Vocera Web Console (learn more here)  Text page via HipChat Paging/Directory    Please see sign in/sign out for up to date coverage information    ______________________________________________________________________    Interval History   No acute events overnight. Still in 10/10 pain today. Does not want to go home, feels her pain is uncontrolled but 'will be ready for sure tomorrow.' no sob. Describes lightheadedness with standing even prior to admission.     Data reviewed today: I reviewed all medications, new labs and imaging results over the last 24 hours. I personally reviewed no images or EKG's today.    Physical Exam   Vital Signs: Temp: 98.4  F (36.9  C) Temp src: Oral BP: 133/65 Pulse: 108   Resp: 18 SpO2: 97 % O2 Device: Nasal cannula Oxygen Delivery: 3 LPM  Weight: 168 lbs 14.4 oz    General: awake, alert, in no acute distress  HEENT: NCAT, sclera anicteric, no nasal discharge, MMM, left side of neck where RHC was done with some bruising and tenderness but no significant swelling, full ROM  CV: RRR, no murmurs noted  Resp: CTAB, no wheezing, no crackles, no increased WOB, on 2L NC  Abd: Soft, nontender, nondistended, +BS, no rebound or guarding  MSK: No peripheral edema, extremities warm and well perfused, normal pulses  Skin: warm, dry, no jaundice  Neuro: No focal deficits    Data   Results for orders placed or performed during the hospital encounter of 11/10/21 (from the past 24 hour(s))   INR   Result Value Ref Range    INR 3.61  (H) 0.85 - 1.15   CBC with platelets   Result Value Ref Range    WBC Count 14.2 (H) 4.0 - 11.0 10e3/uL    RBC Count 2.04 (L) 3.80 - 5.20 10e6/uL    Hemoglobin 6.4 (LL) 11.7 - 15.7 g/dL    Hematocrit 17.1 (L) 35.0 - 47.0 %    MCV 84 78 - 100 fL    MCH 31.4 26.5 - 33.0 pg    MCHC 37.4 (H) 31.5 - 36.5 g/dL    RDW 20.7 (H) 10.0 - 15.0 %    Platelet Count 242 150 - 450 10e3/uL   ABO/Rh type and screen    Narrative    The following orders were created for panel order ABO/Rh type and screen.  Procedure                               Abnormality         Status                     ---------                               -----------         ------                     Adult Type and Screen[[id number]]                            Final result                 Please view results for these tests on the individual orders.   Adult Type and Screen   Result Value Ref Range    ABO/RH(D) O POS     Antibody Screen Negative Negative    SPECIMEN EXPIRATION DATE [id number]    Extra Tube    Narrative    The following orders were created for panel order Extra Tube.  Procedure                               Abnormality         Status                     ---------                               -----------         ------                     Extra Green Top (Lithium...[[id number]]                      Final result                 Please view results for these tests on the individual orders.   Extra Green Top (Lithium Heparin) Tube   Result Value Ref Range    Hold Specimen [facility]    Lactic Acid STAT   Result Value Ref Range    Lactic Acid 0.9 0.7 - 2.0 mmol/L   Prepare red blood cells (unit)   Result Value Ref Range    CROSSMATCH Compatible     UNIT ABO/RH O Pos     Unit Number [id number]     Unit Status Issued     Blood Component Type Red Blood Cells     Product Code F4196G75     CODING SYSTEM [id number]     UNIT TYPE ISBT 5100     ISSUE DATE AND TIME [id number]    US Head Neck Soft Tissue    Narrative    EXAMINATION: US HEAD NECK SOFT TISSUE,  11/20/2021 2:54 PM     COMPARISON: None    HISTORY:  r/o hematoma after right heart cath, left side of neck      TECHNIQUE: Focused/limited ultrasound of the left neck was performed  with grayscale and color Doppler ultrasound.    FINDINGS:  The left internal jugular vein demonstrates normal flow and  compressibility. There are small subcentimeter lymph nodes with fatty  hilum in the area of interest. No hematoma visualized. The common  carotid artery demonstrates color Doppler normal flow.      Impression    IMPRESSION:    1. No definite hematoma or collection demonstrated in the focused  ultrasound of left side of neck.  2.  No deep vein thrombus in the visualized part of left internal  jugular vein.    I have personally reviewed the examination and initial interpretation  and I agree with the findings.    PIPPA LUNDBERG MD         SYSTEM ID:  [id number]     *Note: Due to a large number of results and/or encounters for the requested time period, some results have not been displayed. A complete set of results can be found in Results Review.

## 2021-11-21 NOTE — PROVIDER NOTIFICATION
"Pgd Dr. Cox at 1411    \"Discharge pharmacy concerned over dilaudid prescription as pt picked up Oxy before admission. Does she need both? Thanks\"     Reta #[phone]  "

## 2021-11-21 NOTE — PLAN OF CARE
[phone]    Tachycardic, OVSS on 3LNC. 1 unit(s) PRBC transfused without adverse reaction. 1x oral dilaudid given for pain. Pt reported raw painful skin under right armpit, given lack of mobility in this arm looks moisture related. Provider notified and miconazole powder ordered. Continue with POC.

## 2021-11-21 NOTE — PHARMACY-ANTICOAGULATION SERVICE
Clinical Pharmacy- Warfarin Discharge Note  This patient is currently on warfarin for the treatment of PE.  INR Goal= 2-3  Expected length of therapy lifetime/TBD.          Anticoagulation Dose History     Recent Dosing and Labs Latest Ref Rng & Units 11/15/2021 11/16/2021 11/17/2021 11/18/2021 11/19/2021 11/20/2021 11/21/2021    Warfarin 3 mg - - - - - - 3 mg -    Warfarin 5 mg - - - - 5 mg - - -    Warfarin 7.5 mg - 7.5 mg 7.5 mg 7.5 mg - 7.5 mg - -    INR 0.85 - 1.15 1.88(H) 2.00(H) 2.26(H) 2.82(H) 2.84(H) 3.61(H) 2.85(H)          Recommend discharging the patient on a warfarin regimen of 7.5mg Mondays Wednesdays Fridays, 5mg rest of week with a prescription for warfarin 5mg tablets.      The patient should have an INR checked in 2-3 days.    Genaro Talbot, PharmD

## 2021-11-21 NOTE — PLAN OF CARE
1900- 2300    Tachy in 100s OVSS on 2-3L NC. SATs 94% and above. PRN IV and PO Dilaudid given regularly throughout shift. Good UOP. Last BM reported on 11/18. Pt c/o irritation and moisture in right under arm- PRN Micatin powder given. Continue POC.     Likely discharging today if pain under control- portable tanks at Bedside- passed on [facility] info to day shift- info in sticky note also

## 2021-11-22 ENCOUNTER — APPOINTMENT (OUTPATIENT)
Dept: LAB | Facility: CLINIC | Age: 21
End: 2021-11-22
Attending: PHYSICIAN ASSISTANT
Payer: COMMERCIAL

## 2021-11-22 ENCOUNTER — DOCUMENTATION ONLY (OUTPATIENT)
Dept: PEDIATRICS | Facility: CLINIC | Age: 21
End: 2021-11-22

## 2021-11-22 ENCOUNTER — INFUSION THERAPY VISIT (OUTPATIENT)
Dept: TRANSPLANT | Facility: CLINIC | Age: 21
End: 2021-11-22
Payer: COMMERCIAL

## 2021-11-22 ENCOUNTER — HOME INFUSION (PRE-WILLOW HOME INFUSION) (OUTPATIENT)
Dept: PHARMACY | Facility: CLINIC | Age: 21
End: 2021-11-22

## 2021-11-22 ENCOUNTER — PATIENT OUTREACH (OUTPATIENT)
Dept: CARE COORDINATION | Facility: CLINIC | Age: 21
End: 2021-11-22

## 2021-11-22 ENCOUNTER — VIRTUAL VISIT (OUTPATIENT)
Dept: ONCOLOGY | Facility: CLINIC | Age: 21
End: 2021-11-22
Attending: PHYSICIAN ASSISTANT
Payer: COMMERCIAL

## 2021-11-22 ENCOUNTER — NURSE TRIAGE (OUTPATIENT)
Dept: ONCOLOGY | Facility: CLINIC | Age: 21
End: 2021-11-22

## 2021-11-22 VITALS
RESPIRATION RATE: 14 BRPM | TEMPERATURE: 98.4 F | OXYGEN SATURATION: 86 % | BODY MASS INDEX: 29.27 KG/M2 | WEIGHT: 170.5 LBS | DIASTOLIC BLOOD PRESSURE: 85 MMHG | SYSTOLIC BLOOD PRESSURE: 139 MMHG | HEART RATE: 116 BPM

## 2021-11-22 DIAGNOSIS — D57.00 SICKLE CELL PAIN CRISIS (H): ICD-10-CM

## 2021-11-22 DIAGNOSIS — Z71.89 OTHER SPECIFIED COUNSELING: ICD-10-CM

## 2021-11-22 DIAGNOSIS — G81.10 SPASTIC HEMIPLEGIA, UNSPECIFIED ETIOLOGY, UNSPECIFIED LATERALITY (H): ICD-10-CM

## 2021-11-22 DIAGNOSIS — D57.00 SICKLE CELL PAIN CRISIS (H): Primary | ICD-10-CM

## 2021-11-22 DIAGNOSIS — I27.82 CHRONIC PULMONARY EMBOLISM WITHOUT ACUTE COR PULMONALE, UNSPECIFIED PULMONARY EMBOLISM TYPE (H): ICD-10-CM

## 2021-11-22 DIAGNOSIS — E83.111 IRON OVERLOAD DUE TO REPEATED RED BLOOD CELL TRANSFUSIONS: ICD-10-CM

## 2021-11-22 DIAGNOSIS — D57.1 HB-SS DISEASE WITHOUT CRISIS (H): Primary | ICD-10-CM

## 2021-11-22 LAB
ALBUMIN SERPL-MCNC: 3.9 G/DL (ref 3.4–5)
ALP SERPL-CCNC: 94 U/L (ref 40–150)
ALT SERPL W P-5'-P-CCNC: 94 U/L (ref 0–50)
ANION GAP SERPL CALCULATED.3IONS-SCNC: 6 MMOL/L (ref 3–14)
AST SERPL W P-5'-P-CCNC: 138 U/L (ref 0–45)
BASOPHILS # BLD AUTO: 0.2 10E3/UL (ref 0–0.2)
BASOPHILS NFR BLD AUTO: 2 %
BILIRUB SERPL-MCNC: 5.3 MG/DL (ref 0.2–1.3)
BUN SERPL-MCNC: 8 MG/DL (ref 7–30)
CALCIUM SERPL-MCNC: 9.2 MG/DL (ref 8.5–10.1)
CHLORIDE BLD-SCNC: 104 MMOL/L (ref 94–109)
CO2 SERPL-SCNC: 24 MMOL/L (ref 20–32)
CREAT SERPL-MCNC: 0.48 MG/DL (ref 0.52–1.04)
EOSINOPHIL # BLD AUTO: 0.6 10E3/UL (ref 0–0.7)
EOSINOPHIL NFR BLD AUTO: 5 %
ERYTHROCYTE [DISTWIDTH] IN BLOOD BY AUTOMATED COUNT: 22.8 % (ref 10–15)
GFR SERPL CREATININE-BSD FRML MDRD: >90 ML/MIN/1.73M2
GLUCOSE BLD-MCNC: 104 MG/DL (ref 70–99)
HCT VFR BLD AUTO: 23.1 % (ref 35–47)
HGB BLD-MCNC: 8.8 G/DL (ref 11.7–15.7)
IMM GRANULOCYTES # BLD: 0.1 10E3/UL
IMM GRANULOCYTES NFR BLD: 0 %
LYMPHOCYTES # BLD AUTO: 1.6 10E3/UL (ref 0.8–5.3)
LYMPHOCYTES NFR BLD AUTO: 14 %
MCH RBC QN AUTO: 30.7 PG (ref 26.5–33)
MCHC RBC AUTO-ENTMCNC: 38.1 G/DL (ref 31.5–36.5)
MCV RBC AUTO: 81 FL (ref 78–100)
MONOCYTES # BLD AUTO: 1.3 10E3/UL (ref 0–1.3)
MONOCYTES NFR BLD AUTO: 11 %
NEUTROPHILS # BLD AUTO: 7.7 10E3/UL (ref 1.6–8.3)
NEUTROPHILS NFR BLD AUTO: 68 %
NRBC # BLD AUTO: 0.4 10E3/UL
NRBC BLD AUTO-RTO: 3 /100
PLATELET # BLD AUTO: 444 10E3/UL (ref 150–450)
POTASSIUM BLD-SCNC: 3.9 MMOL/L (ref 3.4–5.3)
PROT SERPL-MCNC: 8.1 G/DL (ref 6.8–8.8)
RBC # BLD AUTO: 2.87 10E6/UL (ref 3.8–5.2)
SODIUM SERPL-SCNC: 134 MMOL/L (ref 133–144)
WBC # BLD AUTO: 11.4 10E3/UL (ref 4–11)

## 2021-11-22 PROCEDURE — 82040 ASSAY OF SERUM ALBUMIN: CPT

## 2021-11-22 PROCEDURE — 96361 HYDRATE IV INFUSION ADD-ON: CPT

## 2021-11-22 PROCEDURE — 258N000003 HC RX IP 258 OP 636: Performed by: PEDIATRICS

## 2021-11-22 PROCEDURE — 250N000011 HC RX IP 250 OP 636: Performed by: PEDIATRICS

## 2021-11-22 PROCEDURE — 99214 OFFICE O/P EST MOD 30 MIN: CPT | Mod: GT | Performed by: PHYSICIAN ASSISTANT

## 2021-11-22 PROCEDURE — 96376 TX/PRO/DX INJ SAME DRUG ADON: CPT

## 2021-11-22 PROCEDURE — 96374 THER/PROPH/DIAG INJ IV PUSH: CPT

## 2021-11-22 PROCEDURE — 85025 COMPLETE CBC W/AUTO DIFF WBC: CPT

## 2021-11-22 PROCEDURE — 36591 DRAW BLOOD OFF VENOUS DEVICE: CPT

## 2021-11-22 RX ORDER — HEPARIN SODIUM,PORCINE 10 UNIT/ML
5 VIAL (ML) INTRAVENOUS
Status: CANCELLED | OUTPATIENT
Start: 2022-01-01

## 2021-11-22 RX ORDER — DIPHENHYDRAMINE HCL 25 MG
25 CAPSULE ORAL
Status: CANCELLED
Start: 2022-01-01

## 2021-11-22 RX ORDER — ONDANSETRON 8 MG/1
8 TABLET, FILM COATED ORAL
Status: CANCELLED
Start: 2022-01-01

## 2021-11-22 RX ORDER — MORPHINE SULFATE 2 MG/ML
2 INJECTION, SOLUTION INTRAMUSCULAR; INTRAVENOUS
Status: COMPLETED | OUTPATIENT
Start: 2021-11-22 | End: 2021-11-22

## 2021-11-22 RX ORDER — HEPARIN SODIUM (PORCINE) LOCK FLUSH IV SOLN 100 UNIT/ML 100 UNIT/ML
5 SOLUTION INTRAVENOUS DAILY PRN
Status: DISCONTINUED | OUTPATIENT
Start: 2021-11-22 | End: 2021-11-22 | Stop reason: HOSPADM

## 2021-11-22 RX ORDER — HEPARIN SODIUM (PORCINE) LOCK FLUSH IV SOLN 100 UNIT/ML 100 UNIT/ML
5 SOLUTION INTRAVENOUS
Status: CANCELLED | OUTPATIENT
Start: 2022-01-01

## 2021-11-22 RX ORDER — MORPHINE SULFATE 2 MG/ML
2 INJECTION, SOLUTION INTRAMUSCULAR; INTRAVENOUS
Status: CANCELLED
Start: 2022-01-01

## 2021-11-22 RX ORDER — MORPHINE SULFATE 15 MG/1
15 TABLET, FILM COATED, EXTENDED RELEASE ORAL EVERY 12 HOURS
Qty: 60 TABLET | Refills: 0 | COMMUNITY
Start: 2021-11-22 | End: 2021-11-30

## 2021-11-22 RX ADMIN — MORPHINE SULFATE 2 MG: 2 INJECTION, SOLUTION INTRAMUSCULAR; INTRAVENOUS at 10:18

## 2021-11-22 RX ADMIN — MORPHINE SULFATE 2 MG: 2 INJECTION, SOLUTION INTRAMUSCULAR; INTRAVENOUS at 12:20

## 2021-11-22 RX ADMIN — MORPHINE SULFATE 2 MG: 2 INJECTION, SOLUTION INTRAMUSCULAR; INTRAVENOUS at 11:10

## 2021-11-22 RX ADMIN — DEXTROSE AND SODIUM CHLORIDE 500 ML: 5; 450 INJECTION, SOLUTION INTRAVENOUS at 10:17

## 2021-11-22 RX ADMIN — SODIUM CHLORIDE, PRESERVATIVE FREE 5 ML: 5 INJECTION INTRAVENOUS at 12:47

## 2021-11-22 ASSESSMENT — PAIN SCALES - GENERAL: PAINLEVEL: WORST PAIN (10)

## 2021-11-22 NOTE — PROGRESS NOTES
Prior Authorization Approval    HYDROmorphone 2mg tabs  Date Initiated: 11/22/2021  Date Completed: 11/22/2021  Prior Auth Type: Quantity Limit                Status: Approved    Effective Date: 10/22/2021 - 11/22/2022  Copay: 0.00     Filling Pharmacy: [facility] - [place], [place] - [address] [place] [address]    Insurance: [employer] - Phone [phone] Fax [phone]  ID: [id number]  Case Number: [id number] / [id number]   Submitted Via: Rosales Nicolas  [facility] Liaison  Ph: [phone] Pager: [phone]

## 2021-11-22 NOTE — PROGRESS NOTES
Infusion Nursing Note:  Jennifer Cervantes presents today for add- on infusion.    Patient seen by provider today: No   present during visit today: Not Applicable.    Note: Patient present for IVF and pain medication. Reports ongoing generalized pain rated 10/10. Started 4 days ago unrelieved with home medication. Took MS Contin and oxycodone at 0600. Patient 86% on RA, declined oxygen at this time. Patient has home oxygen, encouraged patient to use when needed. Denies SOB, difficulty breathing. Denies Chest pain, fever, swelling, N/V.    Received IVF per orders, morphine 2 mg IVP every hour x 3 doses. Pain improvement following third dose rated 6/10.       Intravenous Access:  Implanted Port.    Treatment Conditions:  Lab Results   Component Value Date    HGB 8.8 (L) 11/22/2021    WBC 11.4 (H) 11/22/2021    ANEU 9.0 (H) 11/10/2021    ANEUTAUTO 7.7 11/22/2021     11/22/2021      Results reviewed, labs MET treatment parameters, ok to proceed with treatment.      Post Infusion Assessment:  Patient tolerated infusion without incident.       Discharge Plan:   Copy of AVS reviewed with patient and/or family.    Patient discharged in stable condition accompanied by: self. Encouraged patient to use home oxygen.   Departure Mode: Ambulatory.      Jennifer Lai RN

## 2021-11-22 NOTE — LETTER
11/22/2021         RE: Jennifer Cervantes  [address]  [facility] [address]        Dear Colleague,    Thank you for referring your patient, Jennifer Cervantes, to the [facility]. Please see a copy of my visit note below.    Jennifer is a 21 year old who is being evaluated via a billable video visit.      How would you like to obtain your AVS? MyChart  If the video visit is dropped, the invitation should be resent by: Text to cell phone: [phone]  Will anyone else be joining your video visit? 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      She was admitted 2/1/21-2/3/21 with a new PE, started on Rivaroxaban. Switched to Eliquis 3/25/21 with RUE DVT.     She was admitted 4/26/21-5/11/21 with sickle cell pain crisis complicated by worsening PE in setting of low Apixaban levels, acute hypoxic respiratory failure, pneumonia, acute chest syndrome s/p exchange transfusion on 4/30 and 5/4. After 2nd exchange her oxygen requirement dramatically improved from 20L to 1-2L 5/5 and she was off oxygen as of 5/6.      She was admitted 7/13/21-7/25/21 for acute on chronic PE, switched to dabigitran + ASA.      Due to worsening hypoxia she underwent VQ scan 11/10/21 which showed acute on chronic PE for which she was admitted 11/10-11/21. During admission was switched to warfarin. Echo WNL and no signs of pulm HTN on right heart cath. She did receive 1 unit pRBC for hgb 6.4 during  admission.    She was called today for hospital follow-up.    Interval History:  Jennifer was called today for follow-up. She states her dyspnea is much improved compared to before her hospital stay. She feels the oximeter readings where not correct as she felt fine despite low readings. She has been having more pain since discharge, all over, which she attributes to her blood transfusion, noting she has increase in her pain after transfusions in the past.    She denies fevers, GI concerns. She confirmed she is taking her Warfarin. She is wanting changes to her pain plan.     Current Outpatient Medications   Medication Sig Dispense Refill     acetaminophen (TYLENOL) 325 MG tablet Take 2 tablets (650 mg) by mouth every 6 hours as needed for mild pain 120 tablet 3     albuterol (PROAIR HFA/PROVENTIL HFA/VENTOLIN HFA) 108 (90 Base) MCG/ACT inhaler Inhale 2 puffs into the lungs every 6 hours as needed for shortness of breath / dyspnea or wheezing 8.5 g 3     albuterol (PROVENTIL) (2.5 MG/3ML) 0.083% neb solution Take 1 vial (2.5 mg) by nebulization every 6 hours as needed for shortness of breath / dyspnea or wheezing (Patient not taking: Reported on 11/10/2021) 12 mL 4     ARIPiprazole (ABILIFY) 2 MG tablet Take 1 tablet (2 mg) by mouth daily 30 tablet 3     budesonide-formoterol (SYMBICORT) 160-4.5 MCG/ACT Inhaler Inhale 2 puffs into the lungs 2 times daily 10.2 g 3     diphenhydrAMINE (BENADRYL) 25 MG capsule Take 1-2 capsules (25-50 mg) by mouth nightly as needed for sleep 60 capsule 3     DULoxetine (CYMBALTA) 30 MG capsule Take 1 capsule (30 mg) by mouth 2 times daily 60 capsule 3     EPINEPHrine (ANY BX GENERIC EQUIV) 0.3 MG/0.3ML injection 2-pack Inject 0.3 mLs (0.3 mg) into the muscle as needed for anaphylaxis 1 each 1     HYDROmorphone (DILAUDID) 2 MG tablet Take 1.5-3 tablets (3-6 mg) by mouth every 3 hours as needed for moderate to severe pain (Patient not taking: Reported on 11/22/2021) 24 tablet 0      Hydroxyurea 1000 MG TABS Take 2,000 mg by mouth daily 60 tablet 3     hydrOXYzine (ATARAX) 25 MG tablet Take 1 tablet (25 mg) by mouth 3 times daily as needed for anxiety 30 tablet 1     JADENU 360 MG tablet Take 4 tablets (1,440 mg) by mouth every evening 120 tablet 4     lidocaine-prilocaine (EMLA) 2.5-2.5 % external cream Apply topically once for 1 dose Use for port site as needed (Patient not taking: Reported on 11/10/2021) 30 g 1     medroxyPROGESTERone (DEPO-PROVERA) 150 MG/ML IM injection Inject 150 mg into the muscle       morphine (MS CONTIN) 15 MG CR tablet Take 1 tablet (15 mg) by mouth every 12 hours Take 1/2 tablet every 12 hours initially 60 tablet 0     naloxone (NARCAN) 4 MG/0.1ML nasal spray Spray 1 spray (4 mg) into one nostril alternating nostrils once as needed for opioid reversal every 2-3 minutes until assistance arrives 0.2 mL 1     omeprazole (PRILOSEC) 20 MG DR capsule Take 1 capsule (20 mg) by mouth daily 30 capsule 1     ondansetron (ZOFRAN) 8 MG tablet Take 1 tablet (8 mg) by mouth every 8 hours as needed 30 tablet 1     oxyCODONE IR (ROXICODONE) 15 MG tablet Take 1 tablet (15mg) by mouth every 4-6 hours as needed for severe pain. Goal 4 per day. Max 6 per day. 50 tablet 0     warfarin ANTICOAGULANT (COUMADIN) 5 MG tablet Take 1 tablet (5 mg) by mouth four times a week Take 5 mg on Tues, Thurs, Sat, Sun. INR check on Tues 11/23 so dose can change. 16 tablet 0     warfarin ANTICOAGULANT (COUMADIN) 7.5 MG tablet Take 1 tablet (7.5 mg) by mouth three times a week Take 7.5 mg on Mon, Wed, Fri. INR check on Tues 11/23 so dose can change. 12 tablet 0       Past Medical History  Past Medical History:   Diagnosis Date     Anxiety      Bleeding disorder (H)      Blood clotting disorder (H)      Cerebral infarction (H) 2015     Cognitive developmental delay     low IQ. Please recognize when managing pain and planning with her     Depressive disorder      Hemiplegia and hemiparesis following  cerebral infarction affecting right dominant side (H)     right hand contractures     Iron overload due to repeated red blood cell transfusions      Migraines      Multiple subsegmental pulmonary emboli without acute cor pulmonale (H) 02/01/2021     Oppositional defiant behavior      Superficial venous thrombosis of arm, right 03/25/2021     Uncomplicated asthma      Past Surgical History:   Procedure Laterality Date     AS INSERT TUNNELED CV 2 CATH W/O PORT/PUMP       C BREAST REDUCTION (INCLUDES LIPO) TIER 3 Bilateral 04/23/2019     CHOLECYSTECTOMY       CV RIGHT HEART CATH MEASUREMENTS RECORDED N/A 11/18/2021    Procedure: Right Heart Cath;  Surgeon: Jackson Stauffer MD;  Location:  HEART CARDIAC CATH LAB     INSERT PORT VASCULAR ACCESS Left 4/21/2021    Procedure: INSERTION, VASCULAR ACCESS PORT (NOT SURE ON SIDE UNTIL REMOVAL);  Surgeon: Rajan More MD;  Location: [facility] OR     IR CHEST PORT PLACEMENT > 5 YRS OF AGE  4/21/2021     IR CVC NON TUNNEL LINE REMOVAL  5/6/2021     IR CVC NON TUNNEL PLACEMENT  04/07/2020     IR CVC NON TUNNEL PLACEMENT  4/30/2021     IR PORT REMOVAL LEFT  4/21/2021     REMOVE PORT VASCULAR ACCESS Left 4/21/2021    Procedure: REMOVAL, VASCULAR ACCESS PORT LEFT;  Surgeon: Rajan More MD;  Location: [facility] OR     REPAIR TENDON ELBOW Right 10/02/2019    Procedure: Right Elbow Flexor Lengthening, Flexor Pronator Slide Of Wrist and Finger, Thumb Adductor Lengthening;  Surgeon: Anai Franco MD;  Location: [facility] OR     TONSILLECTOMY Bilateral 10/02/2019    Procedure: Bilateral Tonsillectomy;  Surgeon: Farhana Guy MD;  Location: [facility] OR     Allergies   Allergen Reactions     Contrast Dye      Hives and breathing issues     Fish-Derived Products Hives     Seafood Hives     Diagnostic X-Ray Materials      Gadolinium      Social History   Social History     Tobacco Use     Smoking status: Never Smoker     Smokeless tobacco: Never Used   Substance Use Topics     Alcohol  use: Not Currently     Alcohol/week: 0.0 standard drinks     Drug use: Never      Past medical history and social history were reviewed.    Physical Examination:  There were no vitals taken for this visit.  Wt Readings from Last 10 Encounters:   11/22/21 77.3 kg (170 lb 8 oz)   11/21/21 76.5 kg (168 lb 9.6 oz)   11/03/21 75.8 kg (167 lb 3.2 oz)   11/01/21 75.8 kg (167 lb)   10/19/21 77 kg (169 lb 11.2 oz)   10/13/21 77.2 kg (170 lb 4.8 oz)   09/29/21 77.1 kg (170 lb)   09/22/21 77.7 kg (171 lb 3.2 oz)   09/20/21 78 kg (172 lb)   09/17/21 78 kg (172 lb)     Video physical exam  General: Patient appears well in no acute distress.   Skin: No visualized rash or lesions on visualized skin  Eyes: EOMI, no erythema, sclera icterus or discharge noted  Resp: Appears to be breathing comfortably without accessory muscle usage, speaking in full sentences, no cough  MSK: Appears to have normal range of motion based on visualized movements  Neurologic: No apparent tremors, facial movements symmetric  Psych: affect normal, alert and oriented    The rest of a comprehensive physical examination is deferred due to PHE (public health emergency) video restrictions    Laboratory Data:  Results for HIMANSHU AL (MRN [id number]) as of 11/23/2021 11:38   11/22/2021 10:07   Sodium 134   Potassium 3.9   Chloride 104   Carbon Dioxide 24   Urea Nitrogen 8   Creatinine 0.48 (L)   GFR Estimate >90   Calcium 9.2   Anion Gap 6   Albumin 3.9   Protein Total 8.1   Bilirubin Total 5.3 (H)   Alkaline Phosphatase 94   ALT 94 (H)    (H)   Glucose 104 (H)   WBC 11.4 (H)   Hemoglobin 8.8 (L)   Hematocrit 23.1 (L)   Platelet Count 444   RBC Count 2.87 (L)   MCV 81   MCH 30.7   MCHC 38.1 (H)   RDW 22.8 (H)   % Neutrophils 68   % Lymphocytes 14   % Monocytes 11   % Eosinophils 5   % Basophils 2   Absolute Basophils 0.2   Absolute Eosinophils 0.6   Absolute Immature Granulocytes 0.1 (H)   Absolute Lymphocytes 1.6   Absolute Monocytes 1.3   % Immature  Granulocytes 0   Absolute Neutrophils 7.7   Absolute NRBCs 0.4   NRBCs per 100 WBC 3 (H)     Hospital course including imaging reviewed.     Assessment and Plan:  1. Sickle Cell Disease  HgbSS complicated by hx stroke, acute chest, iron overload, chronic pain, acute on chronic pulmonary embolism with multiple hospitalizations. Today she clinically appears well, though she was in the infusion center earlier today. She has increased pain and we discussed doing infusion tomorrow and Wednesday to try to help with this.      Labs: Hgb stable. WBC slightly elevated but at baseline. CMP at baseline with mild LFT /bili elevation. Overall no acute concerns.      Meds: Stopped Voxeletor. Continue Hydrea 2000mg daily. Contineu Jadenu 4 tablets daily. She is now on Crizanlizumab monthly last given 10/19-overdue, will schedule Continue Desferal every other weekend-next weekend per discussion with Dr. Duncan.      Pain Control: Continue MSContin 15mg BID. Continue Oxycodone to 15mg PRN max 6 per day-#50 tabs per week. DO NOT INCREASE. Was given dilaudid at discharge but now stopped by patient. Continue Cymbalta 30mg BID. Continue Tylenol PRN.     We discussed in detail her inpatient/acute care plan. Discussed the concerns about increasing narcotic dosing and the need to manage chronic pain without consistently increasing opioids. Discussed with Dr. Duncan and will not make any changes to plan.      Follow-up: Continue ANDREAS or MD monthly-next visit 12/20/21 with Dr. Duncan.      2. Neuro  History of stroke. Off ASA now that she is on warfarin.      Dr. Pierce doing botox for spasticity and symptoms improving.      Migraines, chronic issue, saw neurology. Has follow-up with specialist scheduled in Jan     3. Psych  History of anxiety and depression. Doing well with Cymbalta and Abilify.      Follows with outside therapist.     OK to use Benadryl PRN insomnia.      4. Pulm  History of asthma, continue Symbicort and Albuterol  PRN. Still needs to see pulm, not discussed. Has home O2. Monitor O2 sats-were lower in clinic but patient feeling well.    5. Vascular  Acute on chronic PE with DOAC failure, now on warfarin 7.5mg M/W, 5mg T,Th, S, Sn. Spoke with anticoag clinic and they will follow-up with her on INR checks and monitoring. Echo and RHC reassuring. She has home O2.    35 minutes spent on the date of the encounter doing chart review, review of test results, interpretation of tests, patient visit and documentation . Discussion with Dr. Duncan day after visit.    Andrei Machado PA-C  Department of Hematology and Oncology  [facility]         Again, thank you for allowing me to participate in the care of your patient.        Sincerely,        RONALDO Allan

## 2021-11-22 NOTE — PROGRESS NOTES
Clinic Care Coordination Contact  [facility]: Post-Discharge Note  SITUATION                                                      Admission:    Admission Date: 11/10/21   Reason for Admission: Acute hypoxic respiratory failure#Recurrent PTE  Discharge:   Discharge Date: 11/21/21  Discharge Diagnosis: Acute hypoxic respiratory failure#Recurrent PTE    BACKGROUND                                                      Jennifer Cervantes is a 21 year old female with HgbSS c/b frequent pain crises (acute and chronic components), history of CVA  leading to significant cognitive delays and right upper extremity hemiparesis, iron overload 2/2 chronic transfusions, anxiety/depression, asthma, Recurrent PTE on dabigatran and  ASA presented with  worsening of shortness of breath         ASSESSMENT      Enrollment  Primary Care Care Coordination Status: Not a Candidate    Discharge Assessment  How are you doing now that you are home?: the same  How are your symptoms? (Red Flag symptoms escalate to triage hotline per guidelines): Improved  Do you feel your condition is stable enough to be safe at home until your provider visit?: Yes  Does the patient have their discharge instructions? : Yes  Does the patient have questions regarding their discharge instructions? : No  Were you started on any new medications or were there changes to any of your previous medications? : Yes  Does the patient have all of their medications?: Yes  Do you have questions regarding any of your medications? : No  Do you have all of your needed medical supplies or equipment (DME)?  (i.e. oxygen tank, CPAP, cane, etc.): Yes  Discharge follow-up appointment scheduled within 14 calendar days? : Yes  Discharge Follow Up Appointment Date: 11/22/21  Discharge Follow Up Appointment Scheduled with?: Specialty Care Provider              PLAN                                                      Outpatient Plan:      Follow up with primary care provider, Suraj STALEY  Manpreet, within 7 days  for hospital follow- up. No follow up labs or test are needed.  Follow up with your hematologist as scheduled.  You should have your INR blood test checked on Tues or Wed.  Appointments on [place] and/or [facility] (with [facility] or  [facility] provider or service). Call [phone] if you haven't heard  regarding these appointments within 7 days of discharge.      Future Appointments   Date Time Provider Department Center   11/22/2021  3:30 PM Andrei Machado PA [facility]   11/24/2021  9:00 AM Nurse, [facility]   12/1/2021  2:55 PM Eric Alvarado MD [facility]   12/6/2021  9:30 AM Suraj Case MD [facility]   12/8/2021  9:00 AM [facility]   12/20/2021 12:30 PM Eric Duncan MD [facility]   12/21/2021  3:30 PM Gaetano Mccormick MD [facility]   12/22/2021 10:00 AM Isai Horn MD [facility]   1/6/2022  4:30 PM Lidia Shaffer APRN [facility]   1/12/2022 11:00 AM Katherine Pierce MD [facility]         For any urgent concerns, please contact our 24 hour nurse triage line: [phone] ([phone])           KHALIDA Richards  [phone]  Connected Care Resource [facility]

## 2021-11-22 NOTE — PROGRESS NOTES
Jennifer is a 21 year old who is being evaluated via a billable video visit.      How would you like to obtain your AVS? MyChart  If the video visit is dropped, the invitation should be resent by: Text to cell phone: [phone]  Will anyone else be joining your video visit? 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      She was admitted 2/1/21-2/3/21 with a new PE, started on Rivaroxaban. Switched to Eliquis 3/25/21 with RUE DVT.     She was admitted 4/26/21-5/11/21 with sickle cell pain crisis complicated by worsening PE in setting of low Apixaban levels, acute hypoxic respiratory failure, pneumonia, acute chest syndrome s/p exchange transfusion on 4/30 and 5/4. After 2nd exchange her oxygen requirement dramatically improved from 20L to 1-2L 5/5 and she was off oxygen as of 5/6.      She was admitted 7/13/21-7/25/21 for acute on chronic PE, switched to dabigitran + ASA.      Due to worsening hypoxia she underwent VQ scan 11/10/21 which showed acute on chronic PE for which she was admitted 11/10-11/21. During admission was switched to warfarin. Echo WNL and no signs of pulm HTN on right heart cath. She did receive 1 unit pRBC for hgb 6.4 during admission.    She was called today for hospital follow-up.    Interval History:  Jennifer was called today for follow-up. She states her dyspnea is much improved compared to before her hospital stay. She feels the oximeter readings where not correct as she felt  fine despite low readings. She has been having more pain since discharge, all over, which she attributes to her blood transfusion, noting she has increase in her pain after transfusions in the past.    She denies fevers, GI concerns. She confirmed she is taking her Warfarin. She is wanting changes to her pain plan.     Current Outpatient Medications   Medication Sig Dispense Refill     acetaminophen (TYLENOL) 325 MG tablet Take 2 tablets (650 mg) by mouth every 6 hours as needed for mild pain 120 tablet 3     albuterol (PROAIR HFA/PROVENTIL HFA/VENTOLIN HFA) 108 (90 Base) MCG/ACT inhaler Inhale 2 puffs into the lungs every 6 hours as needed for shortness of breath / dyspnea or wheezing 8.5 g 3     albuterol (PROVENTIL) (2.5 MG/3ML) 0.083% neb solution Take 1 vial (2.5 mg) by nebulization every 6 hours as needed for shortness of breath / dyspnea or wheezing (Patient not taking: Reported on 11/10/2021) 12 mL 4     ARIPiprazole (ABILIFY) 2 MG tablet Take 1 tablet (2 mg) by mouth daily 30 tablet 3     budesonide-formoterol (SYMBICORT) 160-4.5 MCG/ACT Inhaler Inhale 2 puffs into the lungs 2 times daily 10.2 g 3     diphenhydrAMINE (BENADRYL) 25 MG capsule Take 1-2 capsules (25-50 mg) by mouth nightly as needed for sleep 60 capsule 3     DULoxetine (CYMBALTA) 30 MG capsule Take 1 capsule (30 mg) by mouth 2 times daily 60 capsule 3     EPINEPHrine (ANY BX GENERIC EQUIV) 0.3 MG/0.3ML injection 2-pack Inject 0.3 mLs (0.3 mg) into the muscle as needed for anaphylaxis 1 each 1     HYDROmorphone (DILAUDID) 2 MG tablet Take 1.5-3 tablets (3-6 mg) by mouth every 3 hours as needed for moderate to severe pain (Patient not taking: Reported on 11/22/2021) 24 tablet 0     Hydroxyurea 1000 MG TABS Take 2,000 mg by mouth daily 60 tablet 3     hydrOXYzine (ATARAX) 25 MG tablet Take 1 tablet (25 mg) by mouth 3 times daily as needed for anxiety 30 tablet 1     JADENU 360 MG tablet Take 4 tablets (1,440 mg) by mouth every evening 120 tablet  4     lidocaine-prilocaine (EMLA) 2.5-2.5 % external cream Apply topically once for 1 dose Use for port site as needed (Patient not taking: Reported on 11/10/2021) 30 g 1     medroxyPROGESTERone (DEPO-PROVERA) 150 MG/ML IM injection Inject 150 mg into the muscle       morphine (MS CONTIN) 15 MG CR tablet Take 1 tablet (15 mg) by mouth every 12 hours Take 1/2 tablet every 12 hours initially 60 tablet 0     naloxone (NARCAN) 4 MG/0.1ML nasal spray Spray 1 spray (4 mg) into one nostril alternating nostrils once as needed for opioid reversal every 2-3 minutes until assistance arrives 0.2 mL 1     omeprazole (PRILOSEC) 20 MG DR capsule Take 1 capsule (20 mg) by mouth daily 30 capsule 1     ondansetron (ZOFRAN) 8 MG tablet Take 1 tablet (8 mg) by mouth every 8 hours as needed 30 tablet 1     oxyCODONE IR (ROXICODONE) 15 MG tablet Take 1 tablet (15mg) by mouth every 4-6 hours as needed for severe pain. Goal 4 per day. Max 6 per day. 50 tablet 0     warfarin ANTICOAGULANT (COUMADIN) 5 MG tablet Take 1 tablet (5 mg) by mouth four times a week Take 5 mg on Tues, Thurs, Sat, Sun. INR check on Tues 11/23 so dose can change. 16 tablet 0     warfarin ANTICOAGULANT (COUMADIN) 7.5 MG tablet Take 1 tablet (7.5 mg) by mouth three times a week Take 7.5 mg on Mon, Wed, Fri. INR check on Tues 11/23 so dose can change. 12 tablet 0       Past Medical History  Past Medical History:   Diagnosis Date     Anxiety      Bleeding disorder (H)      Blood clotting disorder (H)      Cerebral infarction (H) 2015     Cognitive developmental delay     low IQ. 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 (H)     right hand contractures     Iron overload due to repeated red blood cell transfusions      Migraines      Multiple subsegmental pulmonary emboli without acute cor pulmonale (H) 02/01/2021     Oppositional defiant  behavior      Superficial venous thrombosis of arm, right 03/25/2021     Uncomplicated asthma      Past Surgical History:   Procedure Laterality Date     AS INSERT TUNNELED CV 2 CATH W/O PORT/PUMP       C BREAST REDUCTION (INCLUDES LIPO) TIER 3 Bilateral 04/23/2019     CHOLECYSTECTOMY       CV RIGHT HEART CATH MEASUREMENTS RECORDED N/A 11/18/2021    Procedure: Right Heart Cath;  Surgeon: Jackson Stauffer MD;  Location:  [facility]ATH [facility]     INSERT PORT VASCULAR ACCESS Left 4/21/2021    Procedure: INSERTION, VASCULAR ACCESS PORT (NOT SURE ON SIDE UNTIL REMOVAL);  Surgeon: Rajan More MD;  Location: [facility] OR     IR CHEST PORT PLACEMENT > 5 YRS OF AGE  4/21/2021     IR CVC NON TUNNEL LINE REMOVAL  5/6/2021     IR CVC NON TUNNEL PLACEMENT  04/07/2020     IR CVC NON TUNNEL PLACEMENT  4/30/2021     IR PORT REMOVAL LEFT  4/21/2021     REMOVE PORT VASCULAR ACCESS Left 4/21/2021    Procedure: REMOVAL, VASCULAR ACCESS PORT LEFT;  Surgeon: Rajan More MD;  Location: [facility] OR     REPAIR TENDON ELBOW Right 10/02/2019    Procedure: Right Elbow Flexor Lengthening, Flexor Pronator Slide Of Wrist and Finger, Thumb Adductor Lengthening;  Surgeon: Anai Franco MD;  Location: [facility] OR     TONSILLECTOMY Bilateral 10/02/2019    Procedure: Bilateral Tonsillectomy;  Surgeon: Farhana Guy MD;  Location: [facility] OR     Allergies   Allergen Reactions     Contrast Dye      Hives and breathing issues     Fish-Derived Products Hives     Seafood Hives     Diagnostic X-Ray Materials      Gadolinium      Social History   Social History     Tobacco Use     Smoking status: Never Smoker     Smokeless tobacco: Never Used   Substance Use Topics     Alcohol use: Not Currently     Alcohol/week: 0.0 standard drinks     Drug use: Never      Past medical history and social history were reviewed.    Physical Examination:  There were no vitals taken for this visit.  Wt Readings from Last 10 Encounters:   11/22/21 77.3 kg (170 lb  8 oz)   11/21/21 76.5 kg (168 lb 9.6 oz)   11/03/21 75.8 kg (167 lb 3.2 oz)   11/01/21 75.8 kg (167 lb)   10/19/21 77 kg (169 lb 11.2 oz)   10/13/21 77.2 kg (170 lb 4.8 oz)   09/29/21 77.1 kg (170 lb)   09/22/21 77.7 kg (171 lb 3.2 oz)   09/20/21 78 kg (172 lb)   09/17/21 78 kg (172 lb)     Video physical exam  General: Patient appears well in no acute distress.   Skin: No visualized rash or lesions on visualized skin  Eyes: EOMI, no erythema, sclera icterus or discharge noted  Resp: Appears to be breathing comfortably without accessory muscle usage, speaking in full sentences, no cough  MSK: Appears to have normal range of motion based on visualized movements  Neurologic: No apparent tremors, facial movements symmetric  Psych: affect normal, alert and oriented    The rest of a comprehensive physical examination is deferred due to PHE (public health emergency) video restrictions    Laboratory Data:  Results for HIMANSHU AL (MRN [id number]) as of 11/23/2021 11:38   11/22/2021 10:07   Sodium 134   Potassium 3.9   Chloride 104   Carbon Dioxide 24   Urea Nitrogen 8   Creatinine 0.48 (L)   GFR Estimate >90   Calcium 9.2   Anion Gap 6   Albumin 3.9   Protein Total 8.1   Bilirubin Total 5.3 (H)   Alkaline Phosphatase 94   ALT 94 (H)    (H)   Glucose 104 (H)   WBC 11.4 (H)   Hemoglobin 8.8 (L)   Hematocrit 23.1 (L)   Platelet Count 444   RBC Count 2.87 (L)   MCV 81   MCH 30.7   MCHC 38.1 (H)   RDW 22.8 (H)   % Neutrophils 68   % Lymphocytes 14   % Monocytes 11   % Eosinophils 5   % Basophils 2   Absolute Basophils 0.2   Absolute Eosinophils 0.6   Absolute Immature Granulocytes 0.1 (H)   Absolute Lymphocytes 1.6   Absolute Monocytes 1.3   % Immature Granulocytes 0   Absolute Neutrophils 7.7   Absolute NRBCs 0.4   NRBCs per 100 WBC 3 (H)     Hospital course including imaging reviewed.     Assessment and Plan:  1. Sickle Cell Disease  HgbSS complicated by hx stroke, acute chest, iron overload, chronic pain, acute on  chronic pulmonary embolism with multiple hospitalizations. Today she clinically appears well, though she was in the infusion center earlier today. She has increased pain and we discussed doing infusion tomorrow and Wednesday to try to help with this.      Labs: Hgb stable. WBC slightly elevated but at baseline. CMP at baseline with mild LFT /bili elevation. Overall no acute concerns.      Meds: Stopped Voxeletor. Continue Hydrea 2000mg daily. Contineu Jadenu 4 tablets daily. She is now on Crizanlizumab monthly last given 10/19-overdue, will schedule Continue Desferal every other weekend-next weekend per discussion with Dr. Duncan.      Pain Control: Continue MSContin 15mg BID. Continue Oxycodone to 15mg PRN max 6 per day-#50 tabs per week. DO NOT INCREASE. Was given dilaudid at discharge but now stopped by patient. Continue Cymbalta 30mg BID. Continue Tylenol PRN.     We discussed in detail her inpatient/acute care plan. Discussed the concerns about increasing narcotic dosing and the need to manage chronic pain without consistently increasing opioids. Discussed with Dr. Duncan and will not make any changes to plan.      Follow-up: Continue ANDREAS or MD monthly-next visit 12/20/21 with Dr. Duncan.      2. Neuro  History of stroke. Off ASA now that she is on warfarin.      Dr. Pierce doing botox for spasticity and symptoms improving.      Migraines, chronic issue, saw neurology. Has follow-up with specialist scheduled in Jan     3. Psych  History of anxiety and depression. Doing well with Cymbalta and Abilify.      Follows with outside therapist.     OK to use Benadryl PRN insomnia.      4. Pulm  History of asthma, continue Symbicort and Albuterol PRN. Still needs to see pulm, not discussed. Has home O2. Monitor O2 sats-were lower in clinic but patient feeling well.    5. Vascular  Acute on chronic PE with DOAC failure, now on warfarin 7.5mg M/W, 5mg T,Th, S, Sn. Spoke with anticoag clinic and they will follow-up with  her on INR checks and monitoring. Echo and RHC reassuring. She has home O2.    35 minutes spent on the date of the encounter doing chart review, review of test results, interpretation of tests, patient visit and documentation . Discussion with Dr. Duncan day after visit.    Andrei Machado PA-C  Department of Hematology and Oncology  [facility]

## 2021-11-22 NOTE — LETTER
11/22/2021         RE: Jennifer Cervantes  [address]  [facility] [address]        Dear Colleague,    Thank you for referring your patient, Jennifer Cervantes, to the [facility]. Please see a copy of my visit note below.    Jennifer is a 21 year old who is being evaluated via a billable video visit.      How would you like to obtain your AVS? MyChart  If the video visit is dropped, the invitation should be resent by: Text to cell phone: [phone]  Will anyone else be joining your video visit? 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      She was admitted 2/1/21-2/3/21 with a new PE, started on Rivaroxaban. Switched to Eliquis 3/25/21 with RUE DVT.     She was admitted 4/26/21-5/11/21 with sickle cell pain crisis complicated by worsening PE in setting of low Apixaban levels, acute hypoxic respiratory failure, pneumonia, acute chest syndrome s/p exchange transfusion on 4/30 and 5/4. After 2nd exchange her oxygen requirement dramatically improved from 20L to 1-2L 5/5 and she was off oxygen as of 5/6.      She was admitted 7/13/21-7/25/21 for acute on chronic PE, switched to dabigitran + ASA.      Due to worsening hypoxia she underwent VQ scan 11/10/21 which showed acute on chronic PE for which she was admitted 11/10-11/21. During admission was switched to warfarin. Echo WNL and no signs of pulm HTN on right heart cath. She did receive 1 unit pRBC for hgb 6.4 during  admission.    She was called today for hospital follow-up.    Interval History:  Jennifer was called today for follow-up. She states her dyspnea is much improved compared to before her hospital stay. She feels the oximeter readings where not correct as she felt fine despite low readings. She has been having more pain since discharge, all over, which she attributes to her blood transfusion, noting she has increase in her pain after transfusions in the past.    She denies fevers, GI concerns. She confirmed she is taking her Warfarin. She is wanting changes to her pain plan.     Current Outpatient Medications   Medication Sig Dispense Refill     acetaminophen (TYLENOL) 325 MG tablet Take 2 tablets (650 mg) by mouth every 6 hours as needed for mild pain 120 tablet 3     albuterol (PROAIR HFA/PROVENTIL HFA/VENTOLIN HFA) 108 (90 Base) MCG/ACT inhaler Inhale 2 puffs into the lungs every 6 hours as needed for shortness of breath / dyspnea or wheezing 8.5 g 3     albuterol (PROVENTIL) (2.5 MG/3ML) 0.083% neb solution Take 1 vial (2.5 mg) by nebulization every 6 hours as needed for shortness of breath / dyspnea or wheezing (Patient not taking: Reported on 11/10/2021) 12 mL 4     ARIPiprazole (ABILIFY) 2 MG tablet Take 1 tablet (2 mg) by mouth daily 30 tablet 3     budesonide-formoterol (SYMBICORT) 160-4.5 MCG/ACT Inhaler Inhale 2 puffs into the lungs 2 times daily 10.2 g 3     diphenhydrAMINE (BENADRYL) 25 MG capsule Take 1-2 capsules (25-50 mg) by mouth nightly as needed for sleep 60 capsule 3     DULoxetine (CYMBALTA) 30 MG capsule Take 1 capsule (30 mg) by mouth 2 times daily 60 capsule 3     EPINEPHrine (ANY BX GENERIC EQUIV) 0.3 MG/0.3ML injection 2-pack Inject 0.3 mLs (0.3 mg) into the muscle as needed for anaphylaxis 1 each 1     HYDROmorphone (DILAUDID) 2 MG tablet Take 1.5-3 tablets (3-6 mg) by mouth every 3 hours as needed for moderate to severe pain (Patient not taking: Reported on 11/22/2021) 24 tablet 0      Hydroxyurea 1000 MG TABS Take 2,000 mg by mouth daily 60 tablet 3     hydrOXYzine (ATARAX) 25 MG tablet Take 1 tablet (25 mg) by mouth 3 times daily as needed for anxiety 30 tablet 1     JADENU 360 MG tablet Take 4 tablets (1,440 mg) by mouth every evening 120 tablet 4     lidocaine-prilocaine (EMLA) 2.5-2.5 % external cream Apply topically once for 1 dose Use for port site as needed (Patient not taking: Reported on 11/10/2021) 30 g 1     medroxyPROGESTERone (DEPO-PROVERA) 150 MG/ML IM injection Inject 150 mg into the muscle       morphine (MS CONTIN) 15 MG CR tablet Take 1 tablet (15 mg) by mouth every 12 hours Take 1/2 tablet every 12 hours initially 60 tablet 0     naloxone (NARCAN) 4 MG/0.1ML nasal spray Spray 1 spray (4 mg) into one nostril alternating nostrils once as needed for opioid reversal every 2-3 minutes until assistance arrives 0.2 mL 1     omeprazole (PRILOSEC) 20 MG DR capsule Take 1 capsule (20 mg) by mouth daily 30 capsule 1     ondansetron (ZOFRAN) 8 MG tablet Take 1 tablet (8 mg) by mouth every 8 hours as needed 30 tablet 1     oxyCODONE IR (ROXICODONE) 15 MG tablet Take 1 tablet (15mg) by mouth every 4-6 hours as needed for severe pain. Goal 4 per day. Max 6 per day. 50 tablet 0     warfarin ANTICOAGULANT (COUMADIN) 5 MG tablet Take 1 tablet (5 mg) by mouth four times a week Take 5 mg on Tues, Thurs, Sat, Sun. INR check on Tues 11/23 so dose can change. 16 tablet 0     warfarin ANTICOAGULANT (COUMADIN) 7.5 MG tablet Take 1 tablet (7.5 mg) by mouth three times a week Take 7.5 mg on Mon, Wed, Fri. INR check on Tues 11/23 so dose can change. 12 tablet 0       Past Medical History  Past Medical History:   Diagnosis Date     Anxiety      Bleeding disorder (H)      Blood clotting disorder (H)      Cerebral infarction (H) 2015     Cognitive developmental delay     low IQ. Please recognize when managing pain and planning with her     Depressive disorder      Hemiplegia and hemiparesis following  cerebral infarction affecting right dominant side (H)     right hand contractures     Iron overload due to repeated red blood cell transfusions      Migraines      Multiple subsegmental pulmonary emboli without acute cor pulmonale (H) 02/01/2021     Oppositional defiant behavior      Superficial venous thrombosis of arm, right 03/25/2021     Uncomplicated asthma      Past Surgical History:   Procedure Laterality Date     AS INSERT TUNNELED CV 2 CATH W/O PORT/PUMP       C BREAST REDUCTION (INCLUDES LIPO) TIER 3 Bilateral 04/23/2019     CHOLECYSTECTOMY       CV RIGHT HEART CATH MEASUREMENTS RECORDED N/A 11/18/2021    Procedure: Right Heart Cath;  Surgeon: Jackson Stauffer MD;  Location:  HEART CARDIAC CATH LAB     INSERT PORT VASCULAR ACCESS Left 4/21/2021    Procedure: INSERTION, VASCULAR ACCESS PORT (NOT SURE ON SIDE UNTIL REMOVAL);  Surgeon: Rajan More MD;  Location: [facility] OR     IR CHEST PORT PLACEMENT > 5 YRS OF AGE  4/21/2021     IR CVC NON TUNNEL LINE REMOVAL  5/6/2021     IR CVC NON TUNNEL PLACEMENT  04/07/2020     IR CVC NON TUNNEL PLACEMENT  4/30/2021     IR PORT REMOVAL LEFT  4/21/2021     REMOVE PORT VASCULAR ACCESS Left 4/21/2021    Procedure: REMOVAL, VASCULAR ACCESS PORT LEFT;  Surgeon: Rajan More MD;  Location: [facility] OR     REPAIR TENDON ELBOW Right 10/02/2019    Procedure: Right Elbow Flexor Lengthening, Flexor Pronator Slide Of Wrist and Finger, Thumb Adductor Lengthening;  Surgeon: Anai Franco MD;  Location: [facility] OR     TONSILLECTOMY Bilateral 10/02/2019    Procedure: Bilateral Tonsillectomy;  Surgeon: Farhana Guy MD;  Location: [facility] OR     Allergies   Allergen Reactions     Contrast Dye      Hives and breathing issues     Fish-Derived Products Hives     Seafood Hives     Diagnostic X-Ray Materials      Gadolinium      Social History   Social History     Tobacco Use     Smoking status: Never Smoker     Smokeless tobacco: Never Used   Substance Use Topics     Alcohol  use: Not Currently     Alcohol/week: 0.0 standard drinks     Drug use: Never      Past medical history and social history were reviewed.    Physical Examination:  There were no vitals taken for this visit.  Wt Readings from Last 10 Encounters:   11/22/21 77.3 kg (170 lb 8 oz)   11/21/21 76.5 kg (168 lb 9.6 oz)   11/03/21 75.8 kg (167 lb 3.2 oz)   11/01/21 75.8 kg (167 lb)   10/19/21 77 kg (169 lb 11.2 oz)   10/13/21 77.2 kg (170 lb 4.8 oz)   09/29/21 77.1 kg (170 lb)   09/22/21 77.7 kg (171 lb 3.2 oz)   09/20/21 78 kg (172 lb)   09/17/21 78 kg (172 lb)     Video physical exam  General: Patient appears well in no acute distress.   Skin: No visualized rash or lesions on visualized skin  Eyes: EOMI, no erythema, sclera icterus or discharge noted  Resp: Appears to be breathing comfortably without accessory muscle usage, speaking in full sentences, no cough  MSK: Appears to have normal range of motion based on visualized movements  Neurologic: No apparent tremors, facial movements symmetric  Psych: affect normal, alert and oriented    The rest of a comprehensive physical examination is deferred due to PHE (public health emergency) video restrictions    Laboratory Data:  Results for HIMANSHU AL (MRN [id number]) as of 11/23/2021 11:38   11/22/2021 10:07   Sodium 134   Potassium 3.9   Chloride 104   Carbon Dioxide 24   Urea Nitrogen 8   Creatinine 0.48 (L)   GFR Estimate >90   Calcium 9.2   Anion Gap 6   Albumin 3.9   Protein Total 8.1   Bilirubin Total 5.3 (H)   Alkaline Phosphatase 94   ALT 94 (H)    (H)   Glucose 104 (H)   WBC 11.4 (H)   Hemoglobin 8.8 (L)   Hematocrit 23.1 (L)   Platelet Count 444   RBC Count 2.87 (L)   MCV 81   MCH 30.7   MCHC 38.1 (H)   RDW 22.8 (H)   % Neutrophils 68   % Lymphocytes 14   % Monocytes 11   % Eosinophils 5   % Basophils 2   Absolute Basophils 0.2   Absolute Eosinophils 0.6   Absolute Immature Granulocytes 0.1 (H)   Absolute Lymphocytes 1.6   Absolute Monocytes 1.3   % Immature  Granulocytes 0   Absolute Neutrophils 7.7   Absolute NRBCs 0.4   NRBCs per 100 WBC 3 (H)     Hospital course including imaging reviewed.     Assessment and Plan:  1. Sickle Cell Disease  HgbSS complicated by hx stroke, acute chest, iron overload, chronic pain, acute on chronic pulmonary embolism with multiple hospitalizations. Today she clinically appears well, though she was in the infusion center earlier today. She has increased pain and we discussed doing infusion tomorrow and Wednesday to try to help with this.      Labs: Hgb stable. WBC slightly elevated but at baseline. CMP at baseline with mild LFT /bili elevation. Overall no acute concerns.      Meds: Stopped Voxeletor. Continue Hydrea 2000mg daily. Contineu Jadenu 4 tablets daily. She is now on Crizanlizumab monthly last given 10/19-overdue, will schedule Continue Desferal every other weekend-next weekend per discussion with Dr. Duncan.      Pain Control: Continue MSContin 15mg BID. Continue Oxycodone to 15mg PRN max 6 per day-#50 tabs per week. DO NOT INCREASE. Was given dilaudid at discharge but now stopped by patient. Continue Cymbalta 30mg BID. Continue Tylenol PRN.     We discussed in detail her inpatient/acute care plan. Discussed the concerns about increasing narcotic dosing and the need to manage chronic pain without consistently increasing opioids. Discussed with Dr. Duncan and will not make any changes to plan.      Follow-up: Continue ANDREAS or MD monthly-next visit 12/20/21 with Dr. Duncan.      2. Neuro  History of stroke. Off ASA now that she is on warfarin.      Dr. Pierce doing botox for spasticity and symptoms improving.      Migraines, chronic issue, saw neurology. Has follow-up with specialist scheduled in Jan     3. Psych  History of anxiety and depression. Doing well with Cymbalta and Abilify.      Follows with outside therapist.     OK to use Benadryl PRN insomnia.      4. Pulm  History of asthma, continue Symbicort and Albuterol  PRN. Still needs to see pulm, not discussed. Has home O2. Monitor O2 sats-were lower in clinic but patient feeling well.    5. Vascular  Acute on chronic PE with DOAC failure, now on warfarin 7.5mg M/W, 5mg T,Th, S, Sn. Spoke with anticoag clinic and they will follow-up with her on INR checks and monitoring. Echo and RHC reassuring. She has home O2.    35 minutes spent on the date of the encounter doing chart review, review of test results, interpretation of tests, patient visit and documentation . Discussion with Dr. Duncan day after visit.    Andrei Machado PA-C  Department of Hematology and Oncology  [facility]         Again, thank you for allowing me to participate in the care of your patient.        Sincerely,        RONALDO Allan

## 2021-11-22 NOTE — PROGRESS NOTES
Order for home O2 was not called into [facility] on call service when patient discharged 11/21/2021 like I had instructed. Instead, voicemail was left on [facility] main line, which is not checked on weekends. I called patient to coordinate delivery of O2 at her home for today.

## 2021-11-23 ENCOUNTER — ANTICOAGULATION THERAPY VISIT (OUTPATIENT)
Dept: ANTICOAGULATION | Facility: CLINIC | Age: 21
End: 2021-11-23
Payer: COMMERCIAL

## 2021-11-23 ENCOUNTER — INFUSION THERAPY VISIT (OUTPATIENT)
Dept: TRANSPLANT | Facility: CLINIC | Age: 21
End: 2021-11-23
Attending: PEDIATRICS
Payer: COMMERCIAL

## 2021-11-23 ENCOUNTER — ANTICOAGULATION THERAPY VISIT (OUTPATIENT)
Dept: ANTICOAGULATION | Facility: CLINIC | Age: 21
End: 2021-11-23

## 2021-11-23 ENCOUNTER — TELEPHONE (OUTPATIENT)
Dept: ONCOLOGY | Facility: CLINIC | Age: 21
End: 2021-11-23
Payer: COMMERCIAL

## 2021-11-23 ENCOUNTER — PATIENT OUTREACH (OUTPATIENT)
Dept: CARE COORDINATION | Facility: CLINIC | Age: 21
End: 2021-11-23
Payer: COMMERCIAL

## 2021-11-23 VITALS
RESPIRATION RATE: 16 BRPM | DIASTOLIC BLOOD PRESSURE: 85 MMHG | BODY MASS INDEX: 28.79 KG/M2 | OXYGEN SATURATION: 92 % | WEIGHT: 167.7 LBS | HEART RATE: 114 BPM | SYSTOLIC BLOOD PRESSURE: 142 MMHG | TEMPERATURE: 98.1 F

## 2021-11-23 DIAGNOSIS — I27.82 CHRONIC PULMONARY EMBOLISM WITHOUT ACUTE COR PULMONALE, UNSPECIFIED PULMONARY EMBOLISM TYPE (H): Primary | ICD-10-CM

## 2021-11-23 DIAGNOSIS — G81.10 SPASTIC HEMIPLEGIA, UNSPECIFIED ETIOLOGY, UNSPECIFIED LATERALITY (H): ICD-10-CM

## 2021-11-23 DIAGNOSIS — D57.00 SICKLE CELL PAIN CRISIS (H): ICD-10-CM

## 2021-11-23 LAB — INR PPP: 1.48 (ref 0.85–1.15)

## 2021-11-23 PROCEDURE — 85610 PROTHROMBIN TIME: CPT

## 2021-11-23 PROCEDURE — 36591 DRAW BLOOD OFF VENOUS DEVICE: CPT

## 2021-11-23 PROCEDURE — 96374 THER/PROPH/DIAG INJ IV PUSH: CPT

## 2021-11-23 PROCEDURE — 258N000003 HC RX IP 258 OP 636: Performed by: PEDIATRICS

## 2021-11-23 PROCEDURE — 250N000011 HC RX IP 250 OP 636: Performed by: PEDIATRICS

## 2021-11-23 PROCEDURE — 96376 TX/PRO/DX INJ SAME DRUG ADON: CPT

## 2021-11-23 PROCEDURE — 96361 HYDRATE IV INFUSION ADD-ON: CPT

## 2021-11-23 RX ORDER — DIPHENHYDRAMINE HCL 25 MG
25 CAPSULE ORAL
Status: CANCELLED
Start: 2022-01-01

## 2021-11-23 RX ORDER — HEPARIN SODIUM (PORCINE) LOCK FLUSH IV SOLN 100 UNIT/ML 100 UNIT/ML
5 SOLUTION INTRAVENOUS
Status: DISCONTINUED | OUTPATIENT
Start: 2021-11-23 | End: 2021-11-23 | Stop reason: HOSPADM

## 2021-11-23 RX ORDER — HEPARIN SODIUM (PORCINE) LOCK FLUSH IV SOLN 100 UNIT/ML 100 UNIT/ML
5 SOLUTION INTRAVENOUS
Status: CANCELLED | OUTPATIENT
Start: 2022-01-01

## 2021-11-23 RX ORDER — ONDANSETRON 8 MG/1
8 TABLET, FILM COATED ORAL
Status: CANCELLED
Start: 2022-01-01

## 2021-11-23 RX ORDER — MORPHINE SULFATE 2 MG/ML
2 INJECTION, SOLUTION INTRAMUSCULAR; INTRAVENOUS
Status: COMPLETED | OUTPATIENT
Start: 2021-11-23 | End: 2021-11-23

## 2021-11-23 RX ORDER — HEPARIN SODIUM,PORCINE 10 UNIT/ML
5 VIAL (ML) INTRAVENOUS
Status: CANCELLED | OUTPATIENT
Start: 2022-01-01

## 2021-11-23 RX ORDER — MORPHINE SULFATE 2 MG/ML
2 INJECTION, SOLUTION INTRAMUSCULAR; INTRAVENOUS
Status: CANCELLED
Start: 2022-01-01

## 2021-11-23 RX ADMIN — MORPHINE SULFATE 2 MG: 2 INJECTION, SOLUTION INTRAMUSCULAR; INTRAVENOUS at 12:06

## 2021-11-23 RX ADMIN — MORPHINE SULFATE 2 MG: 2 INJECTION, SOLUTION INTRAMUSCULAR; INTRAVENOUS at 13:08

## 2021-11-23 RX ADMIN — Medication 5 ML: at 14:07

## 2021-11-23 RX ADMIN — MORPHINE SULFATE 2 MG: 2 INJECTION, SOLUTION INTRAMUSCULAR; INTRAVENOUS at 14:06

## 2021-11-23 RX ADMIN — DEXTROSE AND SODIUM CHLORIDE 500 ML: 5; 450 INJECTION, SOLUTION INTRAVENOUS at 11:59

## 2021-11-23 ASSESSMENT — PAIN SCALES - GENERAL: PAINLEVEL: WORST PAIN (10)

## 2021-11-23 NOTE — PROGRESS NOTES
Infusion Nursing Note:  Jennifer Cervantes presents today for IVF and IV morphine.    Patient seen by provider today: No   present during visit today: Not Applicable.    Note: generic plan signed. INR drawn per orders.      Intravenous Access:  Implanted Port. Accessed, flushes well, +BR.      Treatment Conditions:   500 mL IVF given over 2 hours. 2 mg IV morphine given q1h x3 times. Patient pain went from 10 to 7. Please see MAR and flowsheets.       Post Infusion Assessment:  Patient tolerated infusion without incident.   Port heparin locked and deaccessed.    Discharge Plan:   Patient discharged in stable condition accompanied by: self.      Maria M Gonzalez RN

## 2021-11-23 NOTE — DISCHARGE SUMMARY
[facility]  Hospitalist Discharge Summary      Date of Admission:  11/10/2021  Date of Discharge:  11/21/2021  3:19 PM  Discharging Provider: Brandyn Lange  Discharge Team: Hospitalist Service, Gold 11    Discharge Diagnoses   Acute on chronic recurrent pulmonary emboli  Acute hypoxic respiratory failure, on 2L  Sickle cell anemia  Acute on chronic pain  Right forearm swelling    Chronic:  Iron overload 2/2 blood transfusions  Bronchial asthma  Depression  CVA with residual deficits    Follow-ups Needed After Discharge   Follow-up Appointments     Adult [facility] [place]/[facility] Follow-up and recommended labs and tests      Follow up with primary care provider, Suraj Case, within 7 days   for hospital follow- up.  No follow up labs or test are needed.    Follow up with your  hematologist as scheduled.      You should have your INR blood test checked on Tues or Wed.    Appointments on [place] and/or [facility] (with [facility] [place] or [facility]   provider or service). Call [phone] if you haven't heard regarding   these appointments within 7 days of discharge.               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     Acute on chronic recurrent pulmonary emboli  Acute hypoxic respiratory failure, on 2L  Diagnosed with PE on 2/1/21 and  started on rivaroxaban. Switched to apixaban on  3/25/21 with RUE DVT. Developed worsening of PE requiring admission from 4/26/21-5/11/21 in setting of low Apixaban levels. Admitted again from 7/13/21-7/25/21 for acute on chronic PE, switched to dabigitran + ASA. States that she is compliant with her anticoagulation.  Presented with worsening of SOB of 1 month.  No chest pain. CXR showed opacities, likely pulmonary edema, but she was started on CAP treatment with IV CTX/azithromycin, completed course of azithromycin and 7 days of CTX/cefdinir. NM perfusion scan on 11/10/21 showed at least 3 new perfusion defects In the background of chronic perfusion defects which are suspicion for acute on chronic PE. These developed in the setting of dabigatran and ASA which is concerning for anticoagulation failure. Seen by Hematology, started on warfarin with bridging on high intensity heparin. APS workup negative. Considered possibility of CTEPH so cardiology was consulted, and she had a normal RHC on 11/18 with no evidence of pulmonary hypertension, normal pulmonary pressures. Due to pain and swelling at RHC site on the left side of the neck, US was done without hematoma or other concerning findings. O2 needs stabilized at 2L O2 by NC.   - Hematology follow up  - Will discharge on 2L O2  - anticipate warfarin 7.5 mg MWF, 5 mg other days, INR check 11/23 or 11/24, anticoagulation clinic referral made     Sickle cell anemia  Acute on chronic pain  Hgb around baseline on admission. Pain acute on chronic. D/w Hematology, not likely to be in acute pain crisis at this time and more likely managing chronic pain. Pain acutely managed with IV Dilaudid and PTA MS contin. She was prescribed oxycodone PTA but she feels PO dilaudid works better and would like to be on dilaudid rather than oxycodone going forward. Hgb was 6.4, patient felt like she 'needed blood' and has had issues with lightheadedness and seeing spots in her vision at  home. D/w hematology, OK to give 1 unit of PRBCs on 11/20.  - Continue PTA MS contin 15mg CR BID  - Given small supply of PO dilaudid, recommend d/w Hematology about switching to PO Dilaudid vs PTA oxycodone PRN for pain management      Right forearm swelling   US DVT negative 11/16, resolved.    Chronic:  Iron overload 2/2 blood transfusions  - PTA Hydrea, Jadenu    Bronchial asthma  - PTA Symbicort, albuterol    Depression  - PTA Cymbalta, Abilify    CVA with residual deficits    Consultations This Hospital Stay   PHARMACY IP CONSULT  PHARMACY IP CONSULT  VASCULAR ACCESS CARE ADULT IP CONSULT  CARE MANAGEMENT / SOCIAL WORK IP CONSULT  HEMATOLOGY ADULT IP CONSULT  PHARMACY TO DOSE WARFARIN  VASCULAR ACCESS CARE ADULT IP CONSULT  PAIN MANAGEMENT ADULT IP CONSULT  VASCULAR ACCESS CARE ADULT IP CONSULT  VASCULAR ACCESS CARE ADULT IP CONSULT  PHARMACY IP CONSULT  VASCULAR ACCESS CARE ADULT IP CONSULT  VASCULAR ACCESS CARE ADULT IP CONSULT  VASCULAR ACCESS ADULT IP CONSULT  PHYSICAL THERAPY ADULT IP CONSULT  OCCUPATIONAL THERAPY ADULT IP CONSULT  CARDIOLOGY GENERAL ADULT IP CONSULT    Code Status   Prior    Time Spent on this Encounter   IBrandyn, personally saw the patient today and spent greater than 30 minutes discharging this patient.       Brandyn Armendariz (Rona Lange MD  Internal Medicine/Pediatrics  Hospitalist    [facility] [address] [address]  Phone: [phone]  ______________________________________________________________________    Physical Exam   Vital Signs:                    Weight: 168 lbs 9.6 oz    General: awake, alert, in no acute distress  HEENT: NCAT, sclera anicteric, no nasal discharge, MMM, left side of neck where RHC was done with some bruising and tenderness but no significant swelling, full ROM  CV: RRR, no murmurs noted  Resp: CTAB, no wheezing, no crackles, no increased WOB, on 2L NC  Abd: Soft, nontender, nondistended, +BS, no rebound or  guarding  MSK: No peripheral edema, extremities warm and well perfused, normal pulses  Skin: warm, dry, no jaundice  Neuro: residual deficits from CVA, R hemiparesis and contracture of arm       Primary Care Physician   Suraj Case    Discharge Orders      Inr Clinic Referral      Reason for your hospital stay    You were admitted for shortness of breath, found to have acute on chronic pulmonary emboli (blood clots in your lungs) and possible pneumonia (though less likely).     Activity    Your activity upon discharge: activity as tolerated     When to contact your care team    Call your primary doctor if you have any of the following: fevers, chills, worsening shortness of breath, chest pain, or any other new concerning symptoms.     Discharge Instructions    1. Continue warfarin for blood clots. Wear oxygen until determined if you don't need it by your doctor. Follow up with your hematologist.     Adult [facility] Follow-up and recommended labs and tests    Follow up with primary care provider, Suraj Case, within 7 days for hospital follow- up.  No follow up labs or test are needed.    Follow up with your  hematologist as scheduled.      You should have your INR blood test checked on Tues or Wed.    Appointments on [place] and/or [facility] (with [facility] or [facility] provider or service). Call [phone] if you haven't heard regarding these appointments within 7 days of discharge.     Oxygen Adult/Peds    Oxygen Documentation:   I certify that this patient, Jennifer Cevrantes has been under my care (or a nurse practitioner or physican's assistant working with me). This is the face-to-face encounter for oxygen medical necessity.      Jennifer Cervantes is now in a chronic stable state and continues to require supplemental oxygen. Patient has continued oxygen desaturation due to Pulmonary Embolism Chronic I27.82.    Alternative treatment(s) tried or considered and deemed clinically infective for treatment of  Pulmonary Embolism Chronic I27.82 include nebulizers and anticoagulation.  If portability is ordered, is the patient mobile within the home? yes    **Patients who qualify for home O2 coverage under the CMS guidelines require ABG tests or O2 sat readings obtained closest to, but no earlier than 2 days prior to the discharge, as evidence of the need for home oxygen therapy. Testing must be performed while patient is in the chronic stable state. See notes for O2 sats.**     Diet    Follow this diet upon discharge: Orders Placed This Encounter      Combination Diet Regular Diet Adult       Significant Results and Procedures   Most Recent 3 CBC's:Recent Labs   Lab Test 11/22/21  1007 11/21/21  0751 11/20/21  0558   WBC 11.4* 13.1* 14.2*   HGB 8.8* 8.6* 6.4*   MCV 81 83 84    390 242     Most Recent 3 BMP's:Recent Labs   Lab Test 11/22/21  1007 11/19/21  0615 11/18/21  1509 11/17/21  0609 11/16/21  0650 11/15/21  0603 11/14/21  0625     --  142  --  140  --  138   POTASSIUM 3.9 4.0 4.0   < > 3.7   < > 4.0   CHLORIDE 104  --   --   --  109  --  110*   CO2 24  --   --   --  26  --  24   BUN 8  --   --   --  11  --  9   CR 0.48*  --   --   --  0.50*  --  0.54   ANIONGAP 6  --   --   --  5  --  4   MICAH 9.2  --   --   --  8.2*  --  8.8   *  --  98  --  101*  --  111*    < > = values in this interval not displayed.     Most Recent 3 INR's:Recent Labs   Lab Test 11/23/21  1201 11/21/21  0751 11/20/21  0558   INR 1.48* 2.85* 3.61*   ,   Results for orders placed or performed during the hospital encounter of 11/10/21   XR Chest Port 1 View    Narrative    Exam: XR CHEST PORT 1 VIEW, 11/12/2021 2:44 PM    Indication: hypoxia, dyspnea    Comparison: 11/10/2021    Findings:   Single portable AP view of the chest. Left chest wall port catheter in  place with the tip in the right atrium. No pneumothorax or pleural  effusions. Trachea is midline. Retrocardiac opacities. Unremarkable  mediastinum and cardiac  silhouette. No acute osseous abnormalities.  Unremarkable upper abdomen and soft tissues.      Impression    Impression: Retrocardiac opacities concerning for infection.    I have personally reviewed the examination and initial interpretation  and I agree with the findings.    VIOLA ESCOBAR MD         SYSTEM ID:  [id number]   NM Lung Scan Perfusion Particulate     Value    Radiologist flags Acute on chronic right pulmonary embolism. (Urgent)    Narrative    Examination: NM LUNG SCAN PERFUSION PARTICULATE       Date: 11/15/2021 3:47 PM     Indication: PE suspected, high prob; no     Comparison: Perfusion study 11/10/2021, 8/7/2021, 7/13/2021.    Additional Information: none    Technique:    The patient received 7 mCi of Tc-99m labeled MAA intravenously.  Ventilation imaging was deferred as per department policy precautions  during COVID-19 pandemic. A standard eight view lung perfusion scan  was obtained. SPECT images of the lungs were obtained. CT images of  the chest were obtained for the purposes of anatomic localization and  attenuation correction only. Fused SPECT-CT processing was performed  in the [employer] workstation, archived in PACS, and reviewed by the  radiologist.    Findings:    Planar images demonstrate new perfusion defect in the right lower  lateral segment. Similar anterior lateral left inferior perfusion  defect compared to prior.    Fused SPECT-CT images demonstrate no pleural effusion, pneumothorax,  for consolidation. Left chest wall Port-A-Cath with tip terminating in  the high right atrium. A substantial change in upper abdominal  lymphadenopathy. Surgically absent gallbladder.      Impression    Impression:  New right lower lateral segment perfusion defect. Multiple bilateral  chronic perfusion defects.    [Urgent Result: Acute on chronic right pulmonary embolism.]    Finding was identified on 11/15/2021 4:20 PM.     Dr. Gomez  was contacted by Dr. Cooper at 11/15/2021 4:37 PM  and  verbalized understanding of the urgent finding.     I have personally reviewed the examination and initial interpretation  and I agree with the findings.    BRANDEN GARCIA MD         SYSTEM ID:  [id number]   XR Chest Port 1 View    Narrative    EXAMINATION:  XR CHEST PORT 1 VIEW 11/15/2021 9:28 PM.    COMPARISON: 11/12/2021.    HISTORY:  hypoxia    FINDINGS: Portable AP view of the chest. Left IJ Port-A-Cath tip  projects over the superior cavoatrial junction. The cardiomediastinal  silhouette is within normal limits. Pulmonary vasculature is  indistinct. Hazy bilateral perihilar and left basilar pulmonary  opacities. Suspected small left pleural effusion. No pneumothorax.  Upper abdomen is unremarkable. No displaced rib fracture.      Impression    IMPRESSION:   1. Hazy bilateral perihilar pulmonary opacities and indistinctness of  the pulmonary vasculature, likely representing pulmonary edema.  Suspected trace left pleural effusion.  2. Retrocardiac atelectasis.    I have personally reviewed the examination and initial interpretation  and I agree with the findings.    ANGELITA ULLOA MD         SYSTEM ID:  [id number]   US Upper Extremity Venous Duplex Right Portable    Narrative    EXAMINATION: DOPPLER VENOUS ULTRASOUND OF THE RIGHT UPPER EXTREMITY,  11/16/2021 3:22 PM     COMPARISON: None.    HISTORY: Swelling, rule out DVT    TECHNIQUE:  Gray-scale evaluation with compression, spectral flow and  color Doppler assessment of the deep venous system of the right upper  extremity.    FINDINGS:  Right: Normal blood flow and waveforms are demonstrated in the  internal jugular, innominate, subclavian, and axillary veins. There is  normal compressibility of the brachial, basilic and cephalic veins.      Impression    IMPRESSION:  1.  No evidence of right upper extremity deep venous thrombosis.    I have personally reviewed the examination and initial interpretation  and I agree with the findings.    BRANDEN  MD JOSE         SYSTEM ID:  [id number]   XR Chest Port 1 View    Narrative    EXAM: XR CHEST PORT 1 VIEW  11/17/2021 3:59 PM     HISTORY:  increased hypoxia, evaluate new infiltrate, edema       COMPARISON:  Chest x-ray 11/15/2021    FINDINGS:   Frontal radiograph of the chest. Left chest wall port catheter tip  terminates at the superior cavoatrial junction. Trachea is midline.  Cardiac size is within normal limits. Mild perihilar opacities,  decreased prior exam. No pleural effusion or pneumothorax. Osseous  structures are within normal limits.      Impression    IMPRESSION:   Decreased perihilar opacities, likely representing atelectasis/edema.  No new airspace opacities.    I have personally reviewed the examination and initial interpretation  and I agree with the findings.    MARY JANE JUÁREZ MD         SYSTEM ID:  [id number]   US Head Neck Soft Tissue    Narrative    EXAMINATION: US HEAD NECK SOFT TISSUE, 11/20/2021 2:54 PM     COMPARISON: None    HISTORY:  r/o hematoma after right heart cath, left side of neck      TECHNIQUE: Focused/limited ultrasound of the left neck was performed  with grayscale and color Doppler ultrasound.    FINDINGS:  The left internal jugular vein demonstrates normal flow and  compressibility. There are small subcentimeter lymph nodes with fatty  hilum in the area of interest. No hematoma visualized. The common  carotid artery demonstrates color Doppler normal flow.      Impression    IMPRESSION:    1. No definite hematoma or collection demonstrated in the focused  ultrasound of left side of neck.  2.  No deep vein thrombus in the visualized part of left internal  jugular vein.    I have personally reviewed the examination and initial interpretation  and I agree with the findings.    PIPPA LUNDBERG MD         SYSTEM ID:  [id number]   Echocardiogram Limited     Value    LVEF  55-60%    Narrative    [id number]  [id number]  [id number]  [id number]^FRANDY^RYAN     [facility]  [facility]  Echocardiography Laboratory  [address] [place] [address]     Name: HIMANSHU AL  MRN: [id number]  : 2000  Study Date: 2021 01:38 PM  Age: 21 yrs  Gender: Female  Patient Location: [facility] [place]  Reason For Study: Dyspnea, Assess RVFX  Ordering Physician: RYAN WISE  Performed By: THEE Lu     BSA: 1.8 m2  Height: 64 in  Weight: 170 lb  BP: 113/53 mmHg  ______________________________________________________________________________  Procedure  Limited Portable Echo Adult.  ______________________________________________________________________________  Interpretation Summary  Limited TTE.     Global and regional left ventricular function is normal with an EF of 55-60%.  The right ventricle is normal in size and function.  No significant valvular abnormality.  PA systolic pressure could not be assessed.  No pericardial effusion.     This study was compared with the study from 2021, no significant change.  ______________________________________________________________________________  Left Ventricle  Global and regional left ventricular function is normal with an EF of 55-60%.     Right Ventricle  The right ventricle is normal size. Global right ventricular function is  normal.     Atria  Both atria appear normal.     Mitral Valve  Mild mitral annular calcification is present.     Aortic Valve  The aortic valve cannot be assessed.     Tricuspid Valve  Trace tricuspid insufficiency is present. Pulmonary artery systolic pressure  cannot be assessed. The peak velocity of the tricuspid regurgitant jet is not  obtainable.     Pulmonic Valve  The pulmonic valve is normal.     Vessels  IVC diameter <2.1 cm collapsing >50% with sniff suggests a normal RA pressure  of 3 mmHg.     Pericardium  No pericardial effusion is present.     Compared to Previous Study  This study was compared with the study from 2021, no significant change. .     Attestation  I have personally  viewed the imaging and agree with the interpretation and  report as documented by the fellow, Margaret Clemons, and/or edited by me.  ______________________________________________________________________________  Doppler Measurements & Calculations  PA V2 max: 118.0 cm/sec  PA max P.6 mmHg  PA acc time: 0.12 sec     ______________________________________________________________________________  Report approved by: Real DON 2021 02:16 PM         Cardiac Catheterization    Narrative      Normal cardiac output.    Normal PA pressures.    Right sided filling pressures are normal.    Left sided filling pressures are normal.        *Note: Due to a large number of results and/or encounters for the requested time period, some results have not been displayed. A complete set of results can be found in Results Review.       Discharge Medications   Discharge Medication List as of 2021  2:45 PM      START taking these medications    Details   !! warfarin ANTICOAGULANT (COUMADIN) 5 MG tablet Take 1 tablet (5 mg) by mouth four times a week Take 5 mg on Tues, Thurs, Sat, Sun. INR check on  so dose can change., Disp-16 tablet, R-0, E-Prescribe      !! warfarin ANTICOAGULANT (COUMADIN) 7.5 MG tablet Take 1 tablet (7.5 mg) by mouth three times a week Take 7.5 mg on Mon, Wed, Fri. INR check on  so dose can change., Disp-12 tablet, R-0, E-Prescribe      HYDROmorphone (DILAUDID) 2 MG tablet Take 1.5-3 tablets (3-6 mg) by mouth every 3 hours as needed for moderate to severe pain, Disp-24 tablet, R-0, Local Print       !! - Potential duplicate medications found. Please discuss with provider.      CONTINUE these medications which have NOT CHANGED    Details   acetaminophen (TYLENOL) 325 MG tablet Take 2 tablets (650 mg) by mouth every 6 hours as needed for mild pain, Disp-120 tablet, R-3, E-Prescribe      albuterol (PROAIR HFA/PROVENTIL HFA/VENTOLIN HFA) 108 (90 Base) MCG/ACT inhaler Inhale 2  puffs into the lungs every 6 hours as needed for shortness of breath / dyspnea or wheezing, Disp-8.5 g, R-3, E-PrescribePharmacy may dispense brand covered by insurance (Proair, or proventil or ventolin or generic albuterol inhaler)      ARIPiprazole (ABILIFY) 2 MG tablet Take 1 tablet (2 mg) by mouth daily, Disp-30 tablet, R-3, E-Prescribe      budesonide-formoterol (SYMBICORT) 160-4.5 MCG/ACT Inhaler Inhale 2 puffs into the lungs 2 times daily, Disp-10.2 g, R-3, E-Prescribe      diphenhydrAMINE (BENADRYL) 25 MG capsule Take 1-2 capsules (25-50 mg) by mouth nightly as needed for sleep, Disp-60 capsule, R-3, E-Prescribe      DULoxetine (CYMBALTA) 30 MG capsule Take 1 capsule (30 mg) by mouth 2 times daily, Disp-60 capsule, R-3, E-Prescribe      EPINEPHrine (ANY BX GENERIC EQUIV) 0.3 MG/0.3ML injection 2-pack Inject 0.3 mLs (0.3 mg) into the muscle as needed for anaphylaxis, Disp-1 each, R-1, E-Prescribe      Hydroxyurea 1000 MG TABS Take 2,000 mg by mouth daily, Disp-60 tablet, R-3, E-Prescribe      hydrOXYzine (ATARAX) 25 MG tablet Take 1 tablet (25 mg) by mouth 3 times daily as needed for anxiety, Disp-30 tablet, R-1, E-Prescribe      JADENU 360 MG tablet Take 4 tablets (1,440 mg) by mouth every evening, Disp-120 tablet, R-4, SHIELA, E-Prescribe      naloxone (NARCAN) 4 MG/0.1ML nasal spray Spray 1 spray (4 mg) into one nostril alternating nostrils once as needed for opioid reversal every 2-3 minutes until assistance arrives, Disp-0.2 mL, R-1, E-Prescribe      omeprazole (PRILOSEC) 20 MG DR capsule Take 1 capsule (20 mg) by mouth daily, Disp-30 capsule, R-1, E-Prescribe      ondansetron (ZOFRAN) 8 MG tablet Take 1 tablet (8 mg) by mouth every 8 hours as needed, Disp-30 tablet, R-1, E-Prescribe      oxyCODONE IR (ROXICODONE) 15 MG tablet Take 1 tablet (15mg) by mouth every 4-6 hours as needed for severe pain. Goal 4 per day. Max 6 per day., Disp-50 tablet, R-0, E-Prescribe*Patient prefers green pills      morphine (MS  CONTIN) 15 MG CR tablet Take 1 tablet (15 mg) by mouth every 12 hours Take 1/2 tablet every 12 hours initially, Disp-60 tablet, R-0, E-Prescribe      albuterol (PROVENTIL) (2.5 MG/3ML) 0.083% neb solution Take 1 vial (2.5 mg) by nebulization every 6 hours as needed for shortness of breath / dyspnea or wheezing, Disp-12 mL, R-4, E-Prescribe      lidocaine-prilocaine (EMLA) 2.5-2.5 % external cream Apply topically once for 1 dose Use for port site as neededDisp-30 g, D-1A-Owioonabn      medroxyPROGESTERone (DEPO-PROVERA) 150 MG/ML IM injection Inject 150 mg into the muscle, Historical         STOP taking these medications       aspirin (ASA) 81 MG chewable tablet Comments:   Reason for Stopping:             Allergies   Allergies   Allergen Reactions     Contrast Dye      Hives and breathing issues     Fish-Derived Products Hives     Seafood Hives     Diagnostic X-Ray Materials      Gadolinium

## 2021-11-23 NOTE — PROGRESS NOTES
Social Work Note: Telephone Call  Oncology Clinic     Data/Intervention:  Patient Name:  Jennifer Cervantes  /Age: 2000, 21 years old     Call From: [facility]        Reason for Call:  Transportation     Assessment:   called [employer] to arrange ride through patient's insurance. [employer] Ride arranged  for patient from home with [employer] ([phone]).  Patient will need to call when ready for return ride home.      Plan:  Patient is aware of the transportation plan.  available to assist with any other needs.      CARLOS Chavez,UDAY  Hematology/Oncology Social Worker  Phone:[phone] Pager: [phone]

## 2021-11-23 NOTE — CONFIDENTIAL NOTE
Pt called in to triage requesting IVF pain meds for all over pain  pain rated 10/10 x states infusion yesterday did not really help. Stated last took prn at oxycodone at 6Am  this morning without relief. Denied any fevers, chills, cough, sob, chest pain, numbness or tingling or other symptoms. Pt's last infusion was 11/22/21, last clinic visit 11/22/21 with follow-up on 12/20/21  with Dr Duncan.   Patient states they do not have own ride and it will take 60 minutes to get to cancer clinic.   Pt denied being out of home medications and needing any refills today.   Pt qualifies for sickle cell standing order protocol.Per DR Duncan's note that pt can schedule 3 consecutive days without contacting MD.   Please note, if you are late for your appt, you risk losing your infusion appt as it may delay another patient's infusion who arrived on time.     BMT can take Jennifer at noon     Call placed to Jennifer she will take the noon availability.     Message sent to  to please assist in setting up ride for Jennifer     Message sent to  to schedule appointment

## 2021-11-23 NOTE — PROGRESS NOTES
Addendum 11/24/21.  Patient was in for an infusion and INR was not done today. Writer calls patient with instruction to take 7.5mg of warfarin for the next 2 days.  Patient has an infusion on 11/26/21. [facility]                ANTICOAGULATION MANAGEMENT     Jennifer Cervantes 21 year old female is on warfarin with subtherapeutic INR result. (Goal INR 2.0-3.0)    Recent labs: (last 7 days)     11/23/21  1201   INR 1.48*       ASSESSMENT     Source(s): Chart Review and Patient/Caregiver Call       Warfarin doses taken: Patient was discharged with 5mg strength tablets and 7.5mg strength tablets.  Patient took 5mg last night instead of 7.5mg    Diet: No new diet changes identified    New illness, injury, or hospitalization: Yes: Patient was just discharged with a new PE.    Medication/supplement changes: None noted    Signs or symptoms of bleeding or clotting: No    Previous INR: Therapeutic last visit; previously outside of goal range    Additional findings: Dr. Duncan does not want Lovenox bridging.  Dr. Duncan suggests warfarin 7.5mg today with a recheck tomorrow.      PLAN     Recommended plan for no diet, medication or health factor changes affecting INR     Dosing Instructions: 7.5mg today with an INR recheck tomorrow.  with next INR in 1 day       Summary  As of 11/23/2021    Full warfarin instructions:  7.5 mg every Mon, Wed, Fri; 5 mg all other days   Next INR check:  11/24/2021             Telephone call with Jennifer who verbalizes understanding and agrees to plan and who agrees to plan and repeated back plan correctly    Check at provider office visit    Education provided: Goal range and significance of current result, Importance of therapeutic range, Importance of following up at instructed interval, Importance of taking warfarin as instructed, Monitoring for clotting signs and symptoms and Information on home INR monitoring    Plan made per [facility] anticoagulation protocol    Gita Khan RN  Anticoagulation  Clinic  11/23/2021    _______________________________________________________________________     Anticoagulation Episode Summary     Current INR goal:  2.0-3.0   TTR:  --   Target end date:  Indefinite   Send INR reminders to:  [facility]    Indications    Chronic pulmonary embolism without acute cor pulmonale  unspecified pulmonary embolism type (H) [I27.82]           Comments:  Infusion center [phone]         Anticoagulation Care Providers     Provider Role Specialty Phone number    Eric Duncan MD Referring Pediatric Hematology-Oncology [phone]

## 2021-11-23 NOTE — LETTER
11/23/2021         RE: Jennifer Cervantes  [address]  [place] [facility] [address]        Dear Colleague,    Thank you for referring your patient, Jennifer Cervantes, to the [facility] BLOOD AND MARROW TRANSPLANT PROGRAM [place]. Please see a copy of my visit note below.    Infusion Nursing Note:  Jennifer Cervantes presents today for IVF and IV morphine.    Patient seen by provider today: No   present during visit today: Not Applicable.    Note: generic plan signed. INR drawn per orders.      Intravenous Access:  Implanted Port. Accessed, flushes well, +BR.      Treatment Conditions:   500 mL IVF given over 2 hours. 2 mg IV morphine given q1h x3 times. Patient pain went from 10 to 7. Please see MAR and flowsheets.       Post Infusion Assessment:  Patient tolerated infusion without incident.   Port heparin locked and deaccessed.    Discharge Plan:   Patient discharged in stable condition accompanied by: self.      Maria M Gonzalez RN                          Again, thank you for allowing me to participate in the care of your patient.        Sincerely,        [facility]

## 2021-11-23 NOTE — TELEPHONE ENCOUNTER
Patricia DIAZ from [facility] called about any openings for infusion tomorrow 11/24.    This writer updated Patricia DIAZ that pt will need to call tomorrow morning if any openings for IVF/Pain tomorrow.     Clinic opens at 7:00am.     Patricia and Jennifer both aware.

## 2021-11-23 NOTE — NURSING NOTE
"Oncology Rooming Note    November 23, 2021 11:42 AM   Jennifer Cervantes is a 21 year old female who presents for:    Chief Complaint   Patient presents with     Infusion     Add on infusion r/t to Sickle Cell disease      Initial Vitals: BP (!) 142/85 (BP Location: Left arm)   Pulse 114   Temp 98.1  F (36.7  C) (Oral)   Resp 16   Wt 76.1 kg (167 lb 11.2 oz)   SpO2 92%   BMI 28.79 kg/m   Estimated body mass index is 28.79 kg/m  as calculated from the following:    Height as of 11/10/21: 1.626 m (5' 4\").    Weight as of this encounter: 76.1 kg (167 lb 11.2 oz). Body surface area is 1.85 meters squared.  Worst Pain (10) Comment: Data Unavailable   No LMP recorded. Patient has had an injection.  Allergies reviewed: Yes  Medications reviewed: Yes    Medications: Medication refills not needed today.  Pharmacy name entered into [facility]:    [facility] - [place], [place] - [address] [place] [address] [address]VE [facility] [place], [place] - [phone]    Clinical concerns: VSS. No clinical concerns.      Maria M Gonzalez RN              "

## 2021-11-23 NOTE — PROGRESS NOTES
ANTICOAGULATION  MANAGEMENT: Discharge Review    Jennifer Cervantes chart reviewed for anticoagulation continuity of care    Hospital Admission on 11/10- for SOB and acute PE and possible pneumonia.    Discharge disposition: Home    Results:    Recent labs: (last 7 days)     21  1414 21  2143 21  0609 21  0603 21  0615 21  0558 21  0751   INR  --   --  2.26* 2.82* 2.84* 3.61* 2.85*   AAUFH 0.46 0.61 0.56 0.29  --   --   --      Anticoagulation inpatient management:     See calender    Anticoagulation discharge instructions:     Warfarin dosin.5mg MWF and 5mg all other days   Bridging: No   INR goal change: No      Medication changes affecting anticoagulation: Yes: Dilaudid and stopped ASA    Additional factors affecting anticoagulation: Yes: Sickle cell and patient is recovering from a hospitalization    Plan     No adjustment to anticoagulation plan needed    Spoke with [facility] and [facility]    Anticoagulation Calendar updated    Gita Khan RN

## 2021-11-24 ENCOUNTER — TELEPHONE (OUTPATIENT)
Dept: ONCOLOGY | Facility: CLINIC | Age: 21
End: 2021-11-24
Payer: COMMERCIAL

## 2021-11-24 ENCOUNTER — INFUSION THERAPY VISIT (OUTPATIENT)
Dept: TRANSPLANT | Facility: CLINIC | Age: 21
End: 2021-11-24
Attending: PEDIATRICS
Payer: COMMERCIAL

## 2021-11-24 ENCOUNTER — PATIENT OUTREACH (OUTPATIENT)
Dept: CARE COORDINATION | Facility: CLINIC | Age: 21
End: 2021-11-24
Payer: COMMERCIAL

## 2021-11-24 VITALS
HEART RATE: 98 BPM | RESPIRATION RATE: 20 BRPM | SYSTOLIC BLOOD PRESSURE: 143 MMHG | DIASTOLIC BLOOD PRESSURE: 84 MMHG | WEIGHT: 168.6 LBS | OXYGEN SATURATION: 93 % | TEMPERATURE: 98.2 F | BODY MASS INDEX: 28.94 KG/M2

## 2021-11-24 DIAGNOSIS — D57.00 SICKLE CELL PAIN CRISIS (H): ICD-10-CM

## 2021-11-24 DIAGNOSIS — G81.10 SPASTIC HEMIPLEGIA, UNSPECIFIED ETIOLOGY, UNSPECIFIED LATERALITY (H): ICD-10-CM

## 2021-11-24 DIAGNOSIS — D57.00 SICKLE CELL CRISIS (H): Primary | ICD-10-CM

## 2021-11-24 LAB
ANION GAP SERPL CALCULATED.3IONS-SCNC: 5 MMOL/L (ref 3–14)
BASOPHILS # BLD AUTO: 0.2 10E3/UL (ref 0–0.2)
BASOPHILS NFR BLD AUTO: 2 %
BUN SERPL-MCNC: 6 MG/DL (ref 7–30)
CALCIUM SERPL-MCNC: 9.1 MG/DL (ref 8.5–10.1)
CHLORIDE BLD-SCNC: 110 MMOL/L (ref 94–109)
CO2 SERPL-SCNC: 24 MMOL/L (ref 20–32)
CREAT SERPL-MCNC: 0.57 MG/DL (ref 0.52–1.04)
EOSINOPHIL # BLD AUTO: 0.2 10E3/UL (ref 0–0.7)
EOSINOPHIL NFR BLD AUTO: 2 %
ERYTHROCYTE [DISTWIDTH] IN BLOOD BY AUTOMATED COUNT: 21.1 % (ref 10–15)
GFR SERPL CREATININE-BSD FRML MDRD: >90 ML/MIN/1.73M2
GLUCOSE BLD-MCNC: 96 MG/DL (ref 70–99)
HCT VFR BLD AUTO: 22.6 % (ref 35–47)
HGB BLD-MCNC: 8 G/DL (ref 11.7–15.7)
IMM GRANULOCYTES # BLD: 0.1 10E3/UL
IMM GRANULOCYTES NFR BLD: 1 %
LYMPHOCYTES # BLD AUTO: 1.9 10E3/UL (ref 0.8–5.3)
LYMPHOCYTES NFR BLD AUTO: 16 %
MCH RBC QN AUTO: 29.7 PG (ref 26.5–33)
MCHC RBC AUTO-ENTMCNC: 35.4 G/DL (ref 31.5–36.5)
MCV RBC AUTO: 84 FL (ref 78–100)
MONOCYTES # BLD AUTO: 1.1 10E3/UL (ref 0–1.3)
MONOCYTES NFR BLD AUTO: 10 %
NEUTROPHILS # BLD AUTO: 8.1 10E3/UL (ref 1.6–8.3)
NEUTROPHILS NFR BLD AUTO: 69 %
NRBC # BLD AUTO: 0.4 10E3/UL
NRBC BLD AUTO-RTO: 3 /100
PLATELET # BLD AUTO: 490 10E3/UL (ref 150–450)
POTASSIUM BLD-SCNC: 3.9 MMOL/L (ref 3.4–5.3)
RBC # BLD AUTO: 2.69 10E6/UL (ref 3.8–5.2)
RETICS # AUTO: 0.41 10E6/UL (ref 0.03–0.1)
RETICS/RBC NFR AUTO: 15.5 % (ref 0.5–2)
SODIUM SERPL-SCNC: 139 MMOL/L (ref 133–144)
WBC # BLD AUTO: 11.6 10E3/UL (ref 4–11)

## 2021-11-24 PROCEDURE — 250N000011 HC RX IP 250 OP 636: Performed by: PHYSICIAN ASSISTANT

## 2021-11-24 PROCEDURE — 80048 BASIC METABOLIC PNL TOTAL CA: CPT | Performed by: PHYSICIAN ASSISTANT

## 2021-11-24 PROCEDURE — 258N000003 HC RX IP 258 OP 636: Performed by: PEDIATRICS

## 2021-11-24 PROCEDURE — 85045 AUTOMATED RETICULOCYTE COUNT: CPT | Performed by: PHYSICIAN ASSISTANT

## 2021-11-24 PROCEDURE — 96375 TX/PRO/DX INJ NEW DRUG ADDON: CPT

## 2021-11-24 PROCEDURE — 258N000003 HC RX IP 258 OP 636: Performed by: PHYSICIAN ASSISTANT

## 2021-11-24 PROCEDURE — 250N000011 HC RX IP 250 OP 636: Performed by: PEDIATRICS

## 2021-11-24 PROCEDURE — 96365 THER/PROPH/DIAG IV INF INIT: CPT

## 2021-11-24 PROCEDURE — 85025 COMPLETE CBC W/AUTO DIFF WBC: CPT | Performed by: PHYSICIAN ASSISTANT

## 2021-11-24 RX ORDER — ALBUTEROL SULFATE 0.83 MG/ML
2.5 SOLUTION RESPIRATORY (INHALATION)
Status: CANCELLED | OUTPATIENT
Start: 2021-11-24

## 2021-11-24 RX ORDER — MORPHINE SULFATE 2 MG/ML
2 INJECTION, SOLUTION INTRAMUSCULAR; INTRAVENOUS
Status: CANCELLED
Start: 2022-01-01

## 2021-11-24 RX ORDER — HEPARIN SODIUM (PORCINE) LOCK FLUSH IV SOLN 100 UNIT/ML 100 UNIT/ML
5 SOLUTION INTRAVENOUS
Status: CANCELLED | OUTPATIENT
Start: 2022-01-01

## 2021-11-24 RX ORDER — ALBUTEROL SULFATE 90 UG/1
1-2 AEROSOL, METERED RESPIRATORY (INHALATION)
Status: CANCELLED
Start: 2021-11-24

## 2021-11-24 RX ORDER — HEPARIN SODIUM (PORCINE) LOCK FLUSH IV SOLN 100 UNIT/ML 100 UNIT/ML
5 SOLUTION INTRAVENOUS
Status: CANCELLED | OUTPATIENT
Start: 2021-11-24

## 2021-11-24 RX ORDER — MORPHINE SULFATE 2 MG/ML
2 INJECTION, SOLUTION INTRAMUSCULAR; INTRAVENOUS
Status: DISCONTINUED | OUTPATIENT
Start: 2021-11-24 | End: 2021-11-24 | Stop reason: HOSPADM

## 2021-11-24 RX ORDER — EPINEPHRINE 1 MG/ML
0.3 INJECTION, SOLUTION INTRAMUSCULAR; SUBCUTANEOUS EVERY 5 MIN PRN
Status: CANCELLED | OUTPATIENT
Start: 2021-11-24

## 2021-11-24 RX ORDER — NALOXONE HYDROCHLORIDE 0.4 MG/ML
0.2 INJECTION, SOLUTION INTRAMUSCULAR; INTRAVENOUS; SUBCUTANEOUS
Status: CANCELLED | OUTPATIENT
Start: 2021-11-24

## 2021-11-24 RX ORDER — DIPHENHYDRAMINE HYDROCHLORIDE 50 MG/ML
50 INJECTION INTRAMUSCULAR; INTRAVENOUS
Status: CANCELLED
Start: 2021-11-24

## 2021-11-24 RX ORDER — MEPERIDINE HYDROCHLORIDE 25 MG/ML
25 INJECTION INTRAMUSCULAR; INTRAVENOUS; SUBCUTANEOUS EVERY 30 MIN PRN
Status: CANCELLED | OUTPATIENT
Start: 2021-11-24

## 2021-11-24 RX ORDER — METHYLPREDNISOLONE SODIUM SUCCINATE 125 MG/2ML
125 INJECTION, POWDER, LYOPHILIZED, FOR SOLUTION INTRAMUSCULAR; INTRAVENOUS
Status: CANCELLED
Start: 2021-11-24

## 2021-11-24 RX ORDER — DIPHENHYDRAMINE HCL 25 MG
25 CAPSULE ORAL
Status: CANCELLED
Start: 2022-01-01

## 2021-11-24 RX ORDER — ONDANSETRON 8 MG/1
8 TABLET, FILM COATED ORAL
Status: CANCELLED
Start: 2022-01-01

## 2021-11-24 RX ORDER — HEPARIN SODIUM,PORCINE 10 UNIT/ML
5 VIAL (ML) INTRAVENOUS
Status: CANCELLED | OUTPATIENT
Start: 2021-11-24

## 2021-11-24 RX ORDER — OXYCODONE HYDROCHLORIDE 15 MG/1
15 TABLET ORAL EVERY 4 HOURS PRN
Qty: 50 TABLET | Refills: 0 | Status: SHIPPED | OUTPATIENT
Start: 2021-11-24 | End: 2021-11-30

## 2021-11-24 RX ORDER — HEPARIN SODIUM (PORCINE) LOCK FLUSH IV SOLN 100 UNIT/ML 100 UNIT/ML
5 SOLUTION INTRAVENOUS EVERY 8 HOURS
Status: DISCONTINUED | OUTPATIENT
Start: 2021-11-24 | End: 2021-11-24 | Stop reason: HOSPADM

## 2021-11-24 RX ORDER — HEPARIN SODIUM,PORCINE 10 UNIT/ML
5 VIAL (ML) INTRAVENOUS
Status: CANCELLED | OUTPATIENT
Start: 2022-01-01

## 2021-11-24 RX ADMIN — SODIUM CHLORIDE, PRESERVATIVE FREE 5 ML: 5 INJECTION INTRAVENOUS at 18:07

## 2021-11-24 RX ADMIN — MORPHINE SULFATE 2 MG: 2 INJECTION, SOLUTION INTRAMUSCULAR; INTRAVENOUS at 17:32

## 2021-11-24 RX ADMIN — DEXTROSE AND SODIUM CHLORIDE 1000 ML: 5; 450 INJECTION, SOLUTION INTRAVENOUS at 15:38

## 2021-11-24 RX ADMIN — MORPHINE SULFATE 2 MG: 2 INJECTION, SOLUTION INTRAMUSCULAR; INTRAVENOUS at 16:33

## 2021-11-24 RX ADMIN — MORPHINE SULFATE 2 MG: 2 INJECTION, SOLUTION INTRAMUSCULAR; INTRAVENOUS at 15:37

## 2021-11-24 RX ADMIN — SODIUM CHLORIDE 400 MG: 9 INJECTION, SOLUTION INTRAVENOUS at 14:28

## 2021-11-24 ASSESSMENT — PAIN SCALES - GENERAL: PAINLEVEL: WORST PAIN (10)

## 2021-11-24 NOTE — PROGRESS NOTES
Infusion Nursing Note:  Jennifer Cervantes presents today for pain meds and fluids and Crizanluzimab.    Patient seen by provider today: No   present during visit today: Not Applicable.     Note: VSS. Pt c/o lower back pain. Pt rates pain 9/10 hoping to reduce pain to 6/10. Port accessed. Pt received Crizanluzimab over 1hr. IV fluids started. Pt declined anti-emetics. Morphing 2mg IV administered x3. Pain down to 6/10 due to interventions. Pt chose to receive remaining 500cc fluids.         Intravenous Access:  Implanted Port.     Treatment Conditions:  Results reviewed, labs MET treatment parameters, ok to proceed with treatment.        Post Infusion Assessment:  Patient tolerated infusion without incident.  Access discontinued per protocol.         Discharge Plan:   Patient discharged in stable condition accompanied by: self.  Departure Mode: Ambulatory.        Yao Cordero RN

## 2021-11-24 NOTE — TELEPHONE ENCOUNTER
Pt called in to triage requesting IVF pain meds for all over pain rated 10/10 that has been ongoing Infusion yesterday helped. Stated last took prn oxycodone, MS contin this morning @ 6 a.m. without relief. Denied any fevers, chills, cough, sob, chest pain or other symptoms. No numbness, tingling, or weakness in extremities.  Pt's last infusion was 11/23, last clinic visit 11/22/21 with follow-up scheduled on 11/30/21 with Andrei. Pt requested refills today for oxycodone. Refill request pended and routed to provider. Pt qualifies for sickle cell standing order protocol.  Pt does need ride. 10-15 minutes from [facility]. Is not at home. Will give address to  when apt time is available. States she is at the hotel for the night.     [facility] can take pt at 1300.   Jennifer confirmed she can come at 1300.  [employer] notified and will assist with a ride for pt.  IB sent to scheduling.    Routed to Andrei and pt's care team.

## 2021-11-24 NOTE — TELEPHONE ENCOUNTER
Refill Request    Date of most recent appointment:  11/22/21  Next upcoming appointment:   11/30/21  Prescribing provider(s):  Andrei JOHNS  Person requesting refill:  Jennifer    Medication requested:  Oxycodone  Quantity:  50  Last fill date:  11/10/21    Notes:  Pt will be out of meds soon  Pain being treated sickle cell pain, today pain is all over  Number of pills remaining: Pt is not sure. Pt asked if this writer could check to see if RX could be refilled. Pt reports she is taking 4 pills/day. If pt picked up RX on 11/10 and is taking 4 pills per day, she should be able to refill RX.  Side effects: no    Other: Would like medication sent to [facility]. Pt is requesting infusion today.    Not  reviewed.    Pended and Routed to [employer]

## 2021-11-24 NOTE — LETTER
11/24/2021         RE: Jennifer Cervantes  [address]  [place] [facility] [address]        Dear Colleague,    Thank you for referring your patient, Jennifer Cervantes, to the [facility] BLOOD AND MARROW TRANSPLANT PROGRAM [place]. Please see a copy of my visit note below.    Infusion Nursing Note:  Jennifer Cervantes presents today for pain meds and fluids and Crizanluzimab.    Patient seen by provider today: No   present during visit today: Not Applicable.     Note: VSS. Pt c/o lower back pain. Pt rates pain 9/10 hoping to reduce pain to 6/10. Port accessed. Pt received Crizanluzimab over 1hr. IV fluids started. Pt declined anti-emetics. Morphing 2mg IV administered x3. Pain down to 6/10 due to interventions. Pt chose to receive remaining 500cc fluids.         Intravenous Access:  Implanted Port.     Treatment Conditions:  Results reviewed, labs MET treatment parameters, ok to proceed with treatment.        Post Infusion Assessment:  Patient tolerated infusion without incident.  Access discontinued per protocol.         Discharge Plan:   Patient discharged in stable condition accompanied by: self.  Departure Mode: Ambulatory.        Yao Cordero RN      Again, thank you for allowing me to participate in the care of your patient.        Sincerely,        [facility]

## 2021-11-24 NOTE — PROGRESS NOTES
Social Work Note: Telephone Call  Oncology Clinic     Data/Intervention:  Patient Name:  Jennifer Cervantes DOB/Age: 2000     Call From: [facility]        Reason for Call:  Transportation     Assessment:   called [place] to arrange ride through patient's insurance. [employer] arranged  for patient from home with [employer] ([phone]).  Patient will need to call when ready for return ride home.      Plan:  Patient is aware of the transportation plan.  available to assist with any other needs.      CARLOS Chavez,SW  Hematology/Oncology Social Worker  Phone:[phone] Pager: [phone]

## 2021-11-24 NOTE — TELEPHONE ENCOUNTER
Per Andrei HIGGINS, pt overdue for Jr infusion, set up for next available.     Writer noticed pt was coming in today for ivf pain meds and confirmed pt able to get Jr today in BMT infusion at 1300, no labs needed per Andrei. SW confirmed cab ride is set up to get pt here by 1300, scheduling messaged.

## 2021-11-25 ENCOUNTER — HOSPITAL ENCOUNTER (EMERGENCY)
Facility: CLINIC | Age: 21
Discharge: HOME | End: 2021-11-25
Attending: EMERGENCY MEDICINE | Admitting: EMERGENCY MEDICINE
Payer: COMMERCIAL

## 2021-11-25 VITALS
TEMPERATURE: 98.6 F | HEIGHT: 64 IN | BODY MASS INDEX: 29.02 KG/M2 | WEIGHT: 170 LBS | OXYGEN SATURATION: 96 % | HEART RATE: 90 BPM | DIASTOLIC BLOOD PRESSURE: 85 MMHG | RESPIRATION RATE: 16 BRPM | SYSTOLIC BLOOD PRESSURE: 121 MMHG

## 2021-11-25 DIAGNOSIS — D57.00 SICKLE CELL PAIN CRISIS (H): ICD-10-CM

## 2021-11-25 LAB
ALBUMIN SERPL-MCNC: 3.9 G/DL (ref 3.4–5)
ALP SERPL-CCNC: 76 U/L (ref 40–150)
ALT SERPL W P-5'-P-CCNC: 74 U/L (ref 0–50)
ANION GAP SERPL CALCULATED.3IONS-SCNC: 7 MMOL/L (ref 3–14)
AST SERPL W P-5'-P-CCNC: 90 U/L (ref 0–45)
BASOPHILS # BLD AUTO: 0.2 10E3/UL (ref 0–0.2)
BASOPHILS NFR BLD AUTO: 2 %
BILIRUB SERPL-MCNC: 3.1 MG/DL (ref 0.2–1.3)
BUN SERPL-MCNC: 4 MG/DL (ref 7–30)
CALCIUM SERPL-MCNC: 8.9 MG/DL (ref 8.5–10.1)
CHLORIDE BLD-SCNC: 108 MMOL/L (ref 94–109)
CO2 SERPL-SCNC: 20 MMOL/L (ref 20–32)
CREAT SERPL-MCNC: 0.54 MG/DL (ref 0.52–1.04)
EOSINOPHIL # BLD AUTO: 0.2 10E3/UL (ref 0–0.7)
EOSINOPHIL NFR BLD AUTO: 1 %
ERYTHROCYTE [DISTWIDTH] IN BLOOD BY AUTOMATED COUNT: 21.9 % (ref 10–15)
GFR SERPL CREATININE-BSD FRML MDRD: >90 ML/MIN/1.73M2
GLUCOSE BLD-MCNC: 82 MG/DL (ref 70–99)
HCT VFR BLD AUTO: 23.2 % (ref 35–47)
HGB BLD-MCNC: 8 G/DL (ref 11.7–15.7)
IMM GRANULOCYTES # BLD: 0.1 10E3/UL
IMM GRANULOCYTES NFR BLD: 0 %
INR PPP: 1.28 (ref 0.85–1.15)
LACTATE SERPL-SCNC: 0.8 MMOL/L (ref 0.7–2)
LYMPHOCYTES # BLD AUTO: 2.3 10E3/UL (ref 0.8–5.3)
LYMPHOCYTES NFR BLD AUTO: 17 %
MCH RBC QN AUTO: 29.9 PG (ref 26.5–33)
MCHC RBC AUTO-ENTMCNC: 34.5 G/DL (ref 31.5–36.5)
MCV RBC AUTO: 87 FL (ref 78–100)
MONOCYTES # BLD AUTO: 1.4 10E3/UL (ref 0–1.3)
MONOCYTES NFR BLD AUTO: 10 %
NEUTROPHILS # BLD AUTO: 9.6 10E3/UL (ref 1.6–8.3)
NEUTROPHILS NFR BLD AUTO: 70 %
NRBC # BLD AUTO: 0.3 10E3/UL
NRBC BLD AUTO-RTO: 2 /100
PLATELET # BLD AUTO: 530 10E3/UL (ref 150–450)
POTASSIUM BLD-SCNC: 3.4 MMOL/L (ref 3.4–5.3)
PROT SERPL-MCNC: 8.1 G/DL (ref 6.8–8.8)
RBC # BLD AUTO: 2.68 10E6/UL (ref 3.8–5.2)
SODIUM SERPL-SCNC: 135 MMOL/L (ref 133–144)
WBC # BLD AUTO: 13.8 10E3/UL (ref 4–11)

## 2021-11-25 PROCEDURE — 85025 COMPLETE CBC W/AUTO DIFF WBC: CPT | Performed by: EMERGENCY MEDICINE

## 2021-11-25 PROCEDURE — 96374 THER/PROPH/DIAG INJ IV PUSH: CPT | Performed by: EMERGENCY MEDICINE

## 2021-11-25 PROCEDURE — 250N000013 HC RX MED GY IP 250 OP 250 PS 637: Performed by: EMERGENCY MEDICINE

## 2021-11-25 PROCEDURE — 83605 ASSAY OF LACTIC ACID: CPT | Performed by: EMERGENCY MEDICINE

## 2021-11-25 PROCEDURE — 85610 PROTHROMBIN TIME: CPT | Performed by: EMERGENCY MEDICINE

## 2021-11-25 PROCEDURE — 99285 EMERGENCY DEPT VISIT HI MDM: CPT | Mod: 25 | Performed by: EMERGENCY MEDICINE

## 2021-11-25 PROCEDURE — 96376 TX/PRO/DX INJ SAME DRUG ADON: CPT | Performed by: EMERGENCY MEDICINE

## 2021-11-25 PROCEDURE — 99284 EMERGENCY DEPT VISIT MOD MDM: CPT | Performed by: EMERGENCY MEDICINE

## 2021-11-25 PROCEDURE — 80053 COMPREHEN METABOLIC PANEL: CPT | Performed by: EMERGENCY MEDICINE

## 2021-11-25 PROCEDURE — 96375 TX/PRO/DX INJ NEW DRUG ADDON: CPT | Performed by: EMERGENCY MEDICINE

## 2021-11-25 PROCEDURE — 258N000003 HC RX IP 258 OP 636: Performed by: EMERGENCY MEDICINE

## 2021-11-25 PROCEDURE — 36415 COLL VENOUS BLD VENIPUNCTURE: CPT | Performed by: EMERGENCY MEDICINE

## 2021-11-25 PROCEDURE — 250N000011 HC RX IP 250 OP 636: Performed by: EMERGENCY MEDICINE

## 2021-11-25 PROCEDURE — 96361 HYDRATE IV INFUSION ADD-ON: CPT | Performed by: EMERGENCY MEDICINE

## 2021-11-25 RX ORDER — MORPHINE SULFATE 2 MG/ML
2 INJECTION, SOLUTION INTRAMUSCULAR; INTRAVENOUS
Status: COMPLETED | OUTPATIENT
Start: 2021-11-25 | End: 2021-11-25

## 2021-11-25 RX ORDER — KETOROLAC TROMETHAMINE 15 MG/ML
15 INJECTION, SOLUTION INTRAMUSCULAR; INTRAVENOUS ONCE
Status: COMPLETED | OUTPATIENT
Start: 2021-11-25 | End: 2021-11-25

## 2021-11-25 RX ORDER — SODIUM CHLORIDE, SODIUM LACTATE, POTASSIUM CHLORIDE, CALCIUM CHLORIDE 600; 310; 30; 20 MG/100ML; MG/100ML; MG/100ML; MG/100ML
INJECTION, SOLUTION INTRAVENOUS ONCE
Status: COMPLETED | OUTPATIENT
Start: 2021-11-25 | End: 2021-11-25

## 2021-11-25 RX ORDER — MORPHINE SULFATE 4 MG/ML
2 INJECTION, SOLUTION INTRAMUSCULAR; INTRAVENOUS ONCE
Status: COMPLETED | OUTPATIENT
Start: 2021-11-25 | End: 2021-11-25

## 2021-11-25 RX ORDER — ONDANSETRON 2 MG/ML
8 INJECTION INTRAMUSCULAR; INTRAVENOUS
Status: DISCONTINUED | OUTPATIENT
Start: 2021-11-25 | End: 2021-11-26 | Stop reason: HOSPADM

## 2021-11-25 RX ORDER — OXYCODONE HYDROCHLORIDE 10 MG/1
20 TABLET ORAL ONCE
Status: COMPLETED | OUTPATIENT
Start: 2021-11-25 | End: 2021-11-25

## 2021-11-25 RX ADMIN — MORPHINE SULFATE 2 MG: 2 INJECTION, SOLUTION INTRAMUSCULAR; INTRAVENOUS at 19:26

## 2021-11-25 RX ADMIN — MORPHINE SULFATE 2 MG: 4 INJECTION INTRAVENOUS at 22:29

## 2021-11-25 RX ADMIN — MORPHINE SULFATE 2 MG: 2 INJECTION, SOLUTION INTRAMUSCULAR; INTRAVENOUS at 20:39

## 2021-11-25 RX ADMIN — KETOROLAC TROMETHAMINE 15 MG: 15 INJECTION, SOLUTION INTRAMUSCULAR; INTRAVENOUS at 19:49

## 2021-11-25 RX ADMIN — OXYCODONE HYDROCHLORIDE 20 MG: 10 TABLET ORAL at 18:20

## 2021-11-25 RX ADMIN — SODIUM CHLORIDE, POTASSIUM CHLORIDE, SODIUM LACTATE AND CALCIUM CHLORIDE: 600; 310; 30; 20 INJECTION, SOLUTION INTRAVENOUS at 18:04

## 2021-11-25 ASSESSMENT — MIFFLIN-ST. JEOR: SCORE: 1516.11

## 2021-11-25 NOTE — ED PROVIDER NOTES
[place] EMERGENCY DEPARTMENT ([facility])  11/25/21 ED 18   History     Chief Complaint   Patient presents with     Sickle Cell Pain Crisis     HPI  Jennifer Cervantes is a 21 year old female with history of sickle cell disease, prior CVA with residual deficits who presents the emergency department sickle cell pain flare.  She had recent hospitalization from 11/10-11/21/2021 for acute on chronic recurrent PE with acute hypoxic respiratory failure.  She was discharged after being started on warfarin.  Patient states she had been doing well until this morning when she developed sickle cell pain.  She notes this is all over her body but is worse in her lower back.  This is typical of previous sickle cell pain crises.  No known precipitating factors.  She does not know of any triggers that cause these.  No recent illness.  No other symptoms noted.    No other symptoms noted.      PAST MEDICAL HISTORY:   Past Medical History:   Diagnosis Date     Anxiety      Bleeding disorder (H)      Blood clotting disorder (H)      Cerebral infarction (H) 2015     Cognitive developmental delay     low IQ. 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 (H)     right hand contractures     Iron overload due to repeated red blood cell transfusions      Migraines      Multiple subsegmental pulmonary emboli without acute cor pulmonale (H) 02/01/2021     Oppositional defiant behavior      Superficial venous thrombosis of arm, right 03/25/2021     Uncomplicated asthma        PAST SURGICAL HISTORY:   Past Surgical History:   Procedure Laterality Date     AS INSERT TUNNELED CV 2 CATH W/O PORT/PUMP       C BREAST REDUCTION (INCLUDES LIPO) TIER 3 Bilateral 04/23/2019     CHOLECYSTECTOMY       CV RIGHT HEART CATH MEASUREMENTS RECORDED N/A 11/18/2021    Procedure: Right Heart Cath;  Surgeon: Jackson Stauffer MD;   Location:  HEART CARDIAC CATH LAB     INSERT PORT VASCULAR ACCESS Left 4/21/2021    Procedure: INSERTION, VASCULAR ACCESS PORT (NOT SURE ON SIDE UNTIL REMOVAL);  Surgeon: Rajan More MD;  Location: [facility] OR     IR CHEST PORT PLACEMENT > 5 YRS OF AGE  4/21/2021     IR CVC NON TUNNEL LINE REMOVAL  5/6/2021     IR CVC NON TUNNEL PLACEMENT  04/07/2020     IR CVC NON TUNNEL PLACEMENT  4/30/2021     IR PORT REMOVAL LEFT  4/21/2021     REMOVE PORT VASCULAR ACCESS Left 4/21/2021    Procedure: REMOVAL, VASCULAR ACCESS PORT LEFT;  Surgeon: Rajan More MD;  Location: [facility] OR     REPAIR TENDON ELBOW Right 10/02/2019    Procedure: Right Elbow Flexor Lengthening, Flexor Pronator Slide Of Wrist and Finger, Thumb Adductor Lengthening;  Surgeon: Anai Franco MD;  Location: [facility] OR     TONSILLECTOMY Bilateral 10/02/2019    Procedure: Bilateral Tonsillectomy;  Surgeon: Farhana Guy MD;  Location: [facility] OR       Past medical history, past surgical history, medications, and allergies were reviewed with the patient. Additional pertinent items: None    FAMILY HISTORY:   Family History   Problem Relation Age of Onset     Sickle Cell Trait Mother      Hypertension Mother      Asthma Mother      Sickle Cell Trait Father        SOCIAL HISTORY:   Social History     Tobacco Use     Smoking status: Never Smoker     Smokeless tobacco: Never Used   Substance Use Topics     Alcohol use: Not Currently     Alcohol/week: 0.0 standard drinks     Social history was reviewed with the patient. Additional pertinent items: None    Patient's Medications   New Prescriptions    No medications on file   Previous Medications    ACETAMINOPHEN (TYLENOL) 325 MG TABLET    Take 2 tablets (650 mg) by mouth every 6 hours as needed for mild pain    ALBUTEROL (PROAIR HFA/PROVENTIL HFA/VENTOLIN HFA) 108 (90 BASE) MCG/ACT INHALER    Inhale 2 puffs into the lungs every 6 hours as needed for shortness of breath / dyspnea or wheezing    ALBUTEROL  (PROVENTIL) (2.5 MG/3ML) 0.083% NEB SOLUTION    Take 1 vial (2.5 mg) by nebulization every 6 hours as needed for shortness of breath / dyspnea or wheezing    ARIPIPRAZOLE (ABILIFY) 2 MG TABLET    Take 1 tablet (2 mg) by mouth daily    BUDESONIDE-FORMOTEROL (SYMBICORT) 160-4.5 MCG/ACT INHALER    Inhale 2 puffs into the lungs 2 times daily    DIPHENHYDRAMINE (BENADRYL) 25 MG CAPSULE    Take 1-2 capsules (25-50 mg) by mouth nightly as needed for sleep    DULOXETINE (CYMBALTA) 30 MG CAPSULE    Take 1 capsule (30 mg) by mouth 2 times daily    EPINEPHRINE (ANY BX GENERIC EQUIV) 0.3 MG/0.3ML INJECTION 2-PACK    Inject 0.3 mLs (0.3 mg) into the muscle as needed for anaphylaxis    HYDROXYUREA 1000 MG TABS    Take 2,000 mg by mouth daily    HYDROXYZINE (ATARAX) 25 MG TABLET    Take 1 tablet (25 mg) by mouth 3 times daily as needed for anxiety    JADENU 360 MG TABLET    Take 4 tablets (1,440 mg) by mouth every evening    LIDOCAINE-PRILOCAINE (EMLA) 2.5-2.5 % EXTERNAL CREAM    Apply topically once for 1 dose Use for port site as needed    MEDROXYPROGESTERONE (DEPO-PROVERA) 150 MG/ML IM INJECTION    Inject 150 mg into the muscle    MORPHINE (MS CONTIN) 15 MG CR TABLET    Take 1 tablet (15 mg) by mouth every 12 hours    NALOXONE (NARCAN) 4 MG/0.1ML NASAL SPRAY    Spray 1 spray (4 mg) into one nostril alternating nostrils once as needed for opioid reversal every 2-3 minutes until assistance arrives    OMEPRAZOLE (PRILOSEC) 20 MG DR CAPSULE    Take 1 capsule (20 mg) by mouth daily    ONDANSETRON (ZOFRAN) 8 MG TABLET    Take 1 tablet (8 mg) by mouth every 8 hours as needed    OXYCODONE IR (ROXICODONE) 15 MG TABLET    Take 1 tablet (15 mg) by mouth every 4 hours as needed for severe pain Goal 4 per day. Max 6 per day.    WARFARIN ANTICOAGULANT (COUMADIN) 5 MG TABLET    Take 1 tablet (5 mg) by mouth four times a week Take 5 mg on Tues, Thurs, Sat, Sun. INR check on Tues 11/23 so dose can change.    WARFARIN ANTICOAGULANT (COUMADIN)  "7.5 MG TABLET    Take 1 tablet (7.5 mg) by mouth three times a week Take 7.5 mg on Mon, Wed, Fri. INR check on Tues 11/23 so dose can change.   Modified Medications    No medications on file   Discontinued Medications    No medications on file          Allergies   Allergen Reactions     Contrast Dye      Hives and breathing issues     Fish-Derived Products Hives     Seafood Hives     Diagnostic X-Ray Materials      Gadolinium          ROS: 14 point ROS neg other than the symptoms noted above in the HPI.      Physical Exam   BP: (!) 134/94  Pulse: 103  Temp: 99.1  F (37.3  C)  Resp: 16  Height: 162.6 cm (5' 4\")  Weight: 77.1 kg (170 lb)  SpO2: 94 %      Physical Exam  Vitals and nursing note reviewed.   Constitutional:       General: She is not in acute distress.     Appearance: She is well-developed. She is not diaphoretic.      Comments: Appears uncomfortable.   HENT:      Head: Normocephalic and atraumatic.      Mouth/Throat:      Pharynx: No oropharyngeal exudate.   Eyes:      General: No scleral icterus.        Right eye: No discharge.         Left eye: No discharge.      Pupils: Pupils are equal, round, and reactive to light.   Cardiovascular:      Rate and Rhythm: Regular rhythm. Tachycardia present.      Heart sounds: Normal heart sounds. No murmur heard.  No friction rub. No gallop.    Pulmonary:      Effort: Pulmonary effort is normal. No respiratory distress.      Breath sounds: Normal breath sounds. No wheezing.   Chest:      Chest wall: No tenderness.   Abdominal:      General: Bowel sounds are normal. There is no distension.      Palpations: Abdomen is soft.      Tenderness: There is no abdominal tenderness.   Musculoskeletal:         General: No tenderness or deformity. Normal range of motion.      Cervical back: Normal range of motion and neck supple.   Skin:     General: Skin is warm and dry.      Coloration: Skin is not pale.      Findings: No erythema or rash.   Neurological:      Mental Status: " She is alert and oriented to person, place, and time.      Cranial Nerves: No cranial nerve deficit.      Comments: Right upper extremity weakness.  Previous CVA.         ED Course        Procedures                      Labs Ordered and Resulted from Time of ED Arrival to Time of ED Departure - No data to display         Assessments & Plan (with Medical Decision Making)   Is a 21-year-old female with a history of sickle cell disease who presents with sickle cell pain crisis.  This is similar to previous pain crises, notes low back pain as well as diffuse body pain.  On exam she is slightly tachycardic, appears uncomfortable.  Lab work shows a WBC count of 13.8 CBC is at baseline.  Per patient's care plan she received 20 mg of oxycodone, 15 mg ketorolac, was started on LR for maintenance fluids and given ondansetron.  On reevaluation patient is still having pain.  Patient was given 2 mg of morphine x3 with a resolution of her symptoms.  Patient is requesting discharge home. Will discharge home with return precautions. Discussed reasons to return to the emergency department.  Patient understands and agrees with this plan.    I have reviewed the nursing notes.    I have reviewed the findings, diagnosis, plan and need for follow up with the patient.    New Prescriptions    No medications on file       Final diagnoses:   None       11/25/2021   [facility] EMERGENCY DEPARTMENT    Jitendra Torres DO  11/26/21 0030

## 2021-11-25 NOTE — ED TRIAGE NOTES
"Pt arrives ambulatory to triage w/ c/o sickle cell pain crisis. States pain started while at rest ~1000 this am. Endorses lower back pain, \"9/10\" in intensity, non radiating, constant. States generalized pain as well.   "

## 2021-11-26 ENCOUNTER — LAB (OUTPATIENT)
Dept: LAB | Facility: CLINIC | Age: 21
End: 2021-11-26
Attending: PHYSICIAN ASSISTANT
Payer: COMMERCIAL

## 2021-11-26 ENCOUNTER — ALLIED HEALTH/NURSE VISIT (OUTPATIENT)
Dept: INTERNAL MEDICINE | Facility: CLINIC | Age: 21
End: 2021-11-26
Payer: COMMERCIAL

## 2021-11-26 ENCOUNTER — TELEPHONE (OUTPATIENT)
Dept: ONCOLOGY | Facility: CLINIC | Age: 21
End: 2021-11-26

## 2021-11-26 ENCOUNTER — ANTICOAGULATION THERAPY VISIT (OUTPATIENT)
Dept: ANTICOAGULATION | Facility: CLINIC | Age: 21
End: 2021-11-26

## 2021-11-26 ENCOUNTER — ONCOLOGY VISIT (OUTPATIENT)
Dept: ONCOLOGY | Facility: CLINIC | Age: 21
End: 2021-11-26
Attending: REGISTERED NURSE
Payer: COMMERCIAL

## 2021-11-26 ENCOUNTER — INFUSION THERAPY VISIT (OUTPATIENT)
Dept: INFUSION THERAPY | Facility: CLINIC | Age: 21
End: 2021-11-26
Attending: REGISTERED NURSE
Payer: COMMERCIAL

## 2021-11-26 VITALS
DIASTOLIC BLOOD PRESSURE: 83 MMHG | TEMPERATURE: 98.6 F | OXYGEN SATURATION: 93 % | RESPIRATION RATE: 17 BRPM | HEART RATE: 106 BPM | SYSTOLIC BLOOD PRESSURE: 127 MMHG

## 2021-11-26 DIAGNOSIS — I27.82 CHRONIC PULMONARY EMBOLISM WITHOUT ACUTE COR PULMONALE, UNSPECIFIED PULMONARY EMBOLISM TYPE (H): Primary | ICD-10-CM

## 2021-11-26 DIAGNOSIS — D57.00 SICKLE CELL PAIN CRISIS (H): ICD-10-CM

## 2021-11-26 DIAGNOSIS — I27.82 CHRONIC PULMONARY EMBOLISM WITHOUT ACUTE COR PULMONALE, UNSPECIFIED PULMONARY EMBOLISM TYPE (H): ICD-10-CM

## 2021-11-26 DIAGNOSIS — E87.6 HYPOKALEMIA: ICD-10-CM

## 2021-11-26 DIAGNOSIS — I26.99 PULMONARY EMBOLISM, OTHER, UNSPECIFIED CHRONICITY, UNSPECIFIED WHETHER ACUTE COR PULMONALE PRESENT (H): ICD-10-CM

## 2021-11-26 DIAGNOSIS — D57.1 HB-SS DISEASE WITHOUT CRISIS (H): ICD-10-CM

## 2021-11-26 DIAGNOSIS — Z30.42 DEPO-PROVERA CONTRACEPTIVE STATUS: Primary | ICD-10-CM

## 2021-11-26 DIAGNOSIS — G81.10 SPASTIC HEMIPLEGIA, UNSPECIFIED ETIOLOGY, UNSPECIFIED LATERALITY (H): Primary | ICD-10-CM

## 2021-11-26 DIAGNOSIS — D57.00 SICKLE CELL PAIN CRISIS (H): Primary | ICD-10-CM

## 2021-11-26 DIAGNOSIS — Z79.01 LONG TERM CURRENT USE OF ANTICOAGULANT THERAPY: ICD-10-CM

## 2021-11-26 LAB
ALBUMIN SERPL-MCNC: 3.7 G/DL (ref 3.4–5)
ALP SERPL-CCNC: 77 U/L (ref 40–150)
ALT SERPL W P-5'-P-CCNC: 84 U/L (ref 0–50)
ANION GAP SERPL CALCULATED.3IONS-SCNC: 3 MMOL/L (ref 3–14)
AST SERPL W P-5'-P-CCNC: 95 U/L (ref 0–45)
BASOPHILS # BLD AUTO: 0.2 10E3/UL (ref 0–0.2)
BASOPHILS NFR BLD AUTO: 2 %
BILIRUB SERPL-MCNC: 3.8 MG/DL (ref 0.2–1.3)
BUN SERPL-MCNC: 4 MG/DL (ref 7–30)
CALCIUM SERPL-MCNC: 9.3 MG/DL (ref 8.5–10.1)
CHLORIDE BLD-SCNC: 112 MMOL/L (ref 94–109)
CO2 SERPL-SCNC: 20 MMOL/L (ref 20–32)
CREAT SERPL-MCNC: 0.57 MG/DL (ref 0.52–1.04)
EOSINOPHIL # BLD AUTO: 0.3 10E3/UL (ref 0–0.7)
EOSINOPHIL NFR BLD AUTO: 3 %
ERYTHROCYTE [DISTWIDTH] IN BLOOD BY AUTOMATED COUNT: 21.2 % (ref 10–15)
GFR SERPL CREATININE-BSD FRML MDRD: >90 ML/MIN/1.73M2
GLUCOSE BLD-MCNC: 102 MG/DL (ref 70–99)
HCG UR QL: NEGATIVE
HCT VFR BLD AUTO: 23.1 % (ref 35–47)
HGB BLD-MCNC: 8 G/DL (ref 11.7–15.7)
IMM GRANULOCYTES # BLD: 0.1 10E3/UL
IMM GRANULOCYTES NFR BLD: 1 %
INR PPP: 1.3 (ref 0.85–1.15)
LYMPHOCYTES # BLD AUTO: 1.9 10E3/UL (ref 0.8–5.3)
LYMPHOCYTES NFR BLD AUTO: 19 %
MCH RBC QN AUTO: 29.7 PG (ref 26.5–33)
MCHC RBC AUTO-ENTMCNC: 34.6 G/DL (ref 31.5–36.5)
MCV RBC AUTO: 86 FL (ref 78–100)
MONOCYTES # BLD AUTO: 0.9 10E3/UL (ref 0–1.3)
MONOCYTES NFR BLD AUTO: 9 %
NEUTROPHILS # BLD AUTO: 6.8 10E3/UL (ref 1.6–8.3)
NEUTROPHILS NFR BLD AUTO: 66 %
NRBC # BLD AUTO: 0.3 10E3/UL
NRBC BLD AUTO-RTO: 3 /100
PLATELET # BLD AUTO: 526 10E3/UL (ref 150–450)
POTASSIUM BLD-SCNC: 3.3 MMOL/L (ref 3.4–5.3)
PROT SERPL-MCNC: 7.9 G/DL (ref 6.8–8.8)
RBC # BLD AUTO: 2.69 10E6/UL (ref 3.8–5.2)
SODIUM SERPL-SCNC: 135 MMOL/L (ref 133–144)
WBC # BLD AUTO: 10.1 10E3/UL (ref 4–11)

## 2021-11-26 PROCEDURE — 80053 COMPREHEN METABOLIC PANEL: CPT

## 2021-11-26 PROCEDURE — 96361 HYDRATE IV INFUSION ADD-ON: CPT

## 2021-11-26 PROCEDURE — 250N000011 HC RX IP 250 OP 636: Performed by: PHYSICIAN ASSISTANT

## 2021-11-26 PROCEDURE — 99207 PR NO CHARGE NURSE ONLY: CPT

## 2021-11-26 PROCEDURE — 36591 DRAW BLOOD OFF VENOUS DEVICE: CPT

## 2021-11-26 PROCEDURE — 250N000011 HC RX IP 250 OP 636: Performed by: PEDIATRICS

## 2021-11-26 PROCEDURE — 250N000013 HC RX MED GY IP 250 OP 250 PS 637: Performed by: REGISTERED NURSE

## 2021-11-26 PROCEDURE — 85025 COMPLETE CBC W/AUTO DIFF WBC: CPT

## 2021-11-26 PROCEDURE — 81025 URINE PREGNANCY TEST: CPT | Performed by: PATHOLOGY

## 2021-11-26 PROCEDURE — 99214 OFFICE O/P EST MOD 30 MIN: CPT | Performed by: REGISTERED NURSE

## 2021-11-26 PROCEDURE — 96372 THER/PROPH/DIAG INJ SC/IM: CPT | Performed by: PEDIATRICS

## 2021-11-26 PROCEDURE — 85610 PROTHROMBIN TIME: CPT

## 2021-11-26 PROCEDURE — 96374 THER/PROPH/DIAG INJ IV PUSH: CPT

## 2021-11-26 PROCEDURE — 96376 TX/PRO/DX INJ SAME DRUG ADON: CPT

## 2021-11-26 PROCEDURE — 258N000003 HC RX IP 258 OP 636: Performed by: PEDIATRICS

## 2021-11-26 RX ORDER — MORPHINE SULFATE 2 MG/ML
2 INJECTION, SOLUTION INTRAMUSCULAR; INTRAVENOUS
Status: COMPLETED | OUTPATIENT
Start: 2021-11-26 | End: 2021-11-26

## 2021-11-26 RX ORDER — MORPHINE SULFATE 2 MG/ML
2 INJECTION, SOLUTION INTRAMUSCULAR; INTRAVENOUS
Status: CANCELLED
Start: 2022-01-01

## 2021-11-26 RX ORDER — ONDANSETRON 8 MG/1
8 TABLET, FILM COATED ORAL
Status: CANCELLED
Start: 2022-01-01

## 2021-11-26 RX ORDER — DIPHENHYDRAMINE HCL 25 MG
25 CAPSULE ORAL
Status: CANCELLED
Start: 2022-01-01

## 2021-11-26 RX ORDER — POTASSIUM CHLORIDE 1500 MG/1
20 TABLET, EXTENDED RELEASE ORAL ONCE
Status: COMPLETED | OUTPATIENT
Start: 2021-11-26 | End: 2021-11-26

## 2021-11-26 RX ORDER — HEPARIN SODIUM (PORCINE) LOCK FLUSH IV SOLN 100 UNIT/ML 100 UNIT/ML
5 SOLUTION INTRAVENOUS ONCE
Status: COMPLETED | OUTPATIENT
Start: 2021-11-26 | End: 2021-11-26

## 2021-11-26 RX ORDER — POTASSIUM CHLORIDE 1500 MG/1
20 TABLET, EXTENDED RELEASE ORAL ONCE
Status: CANCELLED
Start: 2021-11-26 | End: 2021-11-26

## 2021-11-26 RX ORDER — HEPARIN SODIUM,PORCINE 10 UNIT/ML
5 VIAL (ML) INTRAVENOUS
Status: CANCELLED | OUTPATIENT
Start: 2022-01-01

## 2021-11-26 RX ORDER — HEPARIN SODIUM,PORCINE 10 UNIT/ML
5 VIAL (ML) INTRAVENOUS
Status: DISCONTINUED | OUTPATIENT
Start: 2021-11-26 | End: 2021-11-26 | Stop reason: HOSPADM

## 2021-11-26 RX ORDER — HEPARIN SODIUM (PORCINE) LOCK FLUSH IV SOLN 100 UNIT/ML 100 UNIT/ML
5 SOLUTION INTRAVENOUS
Status: DISCONTINUED | OUTPATIENT
Start: 2021-11-26 | End: 2021-11-26 | Stop reason: HOSPADM

## 2021-11-26 RX ORDER — POTASSIUM CHLORIDE 1500 MG/1
20 TABLET, EXTENDED RELEASE ORAL ONCE
Status: CANCELLED
Start: 2022-01-01 | End: 2022-01-01

## 2021-11-26 RX ORDER — HEPARIN SODIUM (PORCINE) LOCK FLUSH IV SOLN 100 UNIT/ML 100 UNIT/ML
5 SOLUTION INTRAVENOUS
Status: CANCELLED | OUTPATIENT
Start: 2022-01-01

## 2021-11-26 RX ADMIN — MEDROXYPROGESTERONE ACETATE 150 MG: 150 INJECTION, SUSPENSION INTRAMUSCULAR at 14:06

## 2021-11-26 RX ADMIN — MORPHINE SULFATE 2 MG: 2 INJECTION, SOLUTION INTRAMUSCULAR; INTRAVENOUS at 10:50

## 2021-11-26 RX ADMIN — POTASSIUM CHLORIDE 20 MEQ: 1500 TABLET, EXTENDED RELEASE ORAL at 13:13

## 2021-11-26 RX ADMIN — DEXTROSE AND SODIUM CHLORIDE 500 ML: 5; 450 INJECTION, SOLUTION INTRAVENOUS at 10:49

## 2021-11-26 RX ADMIN — MORPHINE SULFATE 2 MG: 2 INJECTION, SOLUTION INTRAMUSCULAR; INTRAVENOUS at 11:51

## 2021-11-26 RX ADMIN — Medication 5 ML: at 09:19

## 2021-11-26 RX ADMIN — Medication 5 ML: at 13:14

## 2021-11-26 RX ADMIN — MORPHINE SULFATE 2 MG: 2 INJECTION, SOLUTION INTRAMUSCULAR; INTRAVENOUS at 12:51

## 2021-11-26 ASSESSMENT — PAIN SCALES - GENERAL: PAINLEVEL: EXTREME PAIN (9)

## 2021-11-26 NOTE — NURSING NOTE
Chief Complaint   Patient presents with     Port Draw     Labs drawn via port by RN in lab.      Port accessed with 20 gauge 3/4 inch flat needle and labs drawn by RN.  Port flushed with saline and heparin. Port then de-accessed.  Pt tolerated well.      Melissa Wakefield RN

## 2021-11-26 NOTE — PROGRESS NOTES
Jennifer Cervantes comes into clinic today at the request of Dr. Case Ordering Provider for the Depo contraceptive injection.    Patient tolerated the injection well. No redness or swelling at the injection site. Patient feels well after administration of the injection.    This service provided today was under the supervising provider of the day Dr. Vanegas, who was available if needed.    Clyde Ramos MA

## 2021-11-26 NOTE — PROGRESS NOTES
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      She was admitted 2/1/21-2/3/21 with a new PE, started on Rivaroxaban. Switched to Eliquis 3/25/21 with RUE DVT.     She was admitted 4/26/21-5/11/21 with sickle cell pain crisis complicated by worsening PE in setting of low Apixaban levels, acute hypoxic respiratory failure, pneumonia, acute chest syndrome s/p exchange transfusion on 4/30 and 5/4. After 2nd exchange her oxygen requirement dramatically improved from 20L to 1-2L 5/5 and she was off oxygen as of 5/6.      She was admitted 7/13/21-7/25/21 for acute on chronic PE, switched to dabigitran + ASA.      Due to worsening hypoxia she underwent VQ scan 11/10/21 which showed acute on chronic PE for which she was admitted 11/10-11/21. During admission was switched to warfarin. Echo WNL and no signs of pulm HTN on right heart cath. She did receive 1 unit pRBC for hgb 6.4 during admission.    Interval History:  Jennifer was seen today for evaluation of acute sickle cell pain. She was in the ED for pain last night. Reports pain is \"all over\".  States her pain has not worsened but also has not improved since her ED visit. She took oxycodone this morning with no relief. Was in the clinic for a separate appointment today and reached out see if she could get in for fluids. She's had a difficult week with pain which she relates to the recent colder temperatures.    She's taking her coumadin daily without missed doses. No concerning bleeding " or bruising. Denies shortness of breath, cough, dizziness or lightheadedness. No recent headaches. She and her family are activity moving from [place] to [facility]. She has been busy packing.     Current Outpatient Medications   Medication Sig Dispense Refill     acetaminophen (TYLENOL) 325 MG tablet Take 2 tablets (650 mg) by mouth every 6 hours as needed for mild pain 120 tablet 3     albuterol (PROAIR HFA/PROVENTIL HFA/VENTOLIN HFA) 108 (90 Base) MCG/ACT inhaler Inhale 2 puffs into the lungs every 6 hours as needed for shortness of breath / dyspnea or wheezing 8.5 g 3     albuterol (PROVENTIL) (2.5 MG/3ML) 0.083% neb solution Take 1 vial (2.5 mg) by nebulization every 6 hours as needed for shortness of breath / dyspnea or wheezing 12 mL 4     ARIPiprazole (ABILIFY) 2 MG tablet Take 1 tablet (2 mg) by mouth daily 30 tablet 3     budesonide-formoterol (SYMBICORT) 160-4.5 MCG/ACT Inhaler Inhale 2 puffs into the lungs 2 times daily 10.2 g 3     diphenhydrAMINE (BENADRYL) 25 MG capsule Take 1-2 capsules (25-50 mg) by mouth nightly as needed for sleep 60 capsule 3     DULoxetine (CYMBALTA) 30 MG capsule Take 1 capsule (30 mg) by mouth 2 times daily 60 capsule 3     EPINEPHrine (ANY BX GENERIC EQUIV) 0.3 MG/0.3ML injection 2-pack Inject 0.3 mLs (0.3 mg) into the muscle as needed for anaphylaxis 1 each 1     Hydroxyurea 1000 MG TABS Take 2,000 mg by mouth daily 60 tablet 3     hydrOXYzine (ATARAX) 25 MG tablet Take 1 tablet (25 mg) by mouth 3 times daily as needed for anxiety 30 tablet 1     JADENU 360 MG tablet Take 4 tablets (1,440 mg) by mouth every evening 120 tablet 4     lidocaine-prilocaine (EMLA) 2.5-2.5 % external cream Apply topically once for 1 dose Use for port site as needed (Patient not taking: Reported on 11/10/2021) 30 g 1     medroxyPROGESTERone (DEPO-PROVERA) 150 MG/ML IM injection Inject 150 mg into the muscle       morphine (MS CONTIN) 15 MG CR tablet Take 1 tablet (15 mg) by mouth every 12  hours 60 tablet 0     naloxone (NARCAN) 4 MG/0.1ML nasal spray Spray 1 spray (4 mg) into one nostril alternating nostrils once as needed for opioid reversal every 2-3 minutes until assistance arrives 0.2 mL 1     omeprazole (PRILOSEC) 20 MG DR capsule Take 1 capsule (20 mg) by mouth daily 30 capsule 1     ondansetron (ZOFRAN) 8 MG tablet Take 1 tablet (8 mg) by mouth every 8 hours as needed 30 tablet 1     oxyCODONE IR (ROXICODONE) 15 MG tablet Take 1 tablet (15 mg) by mouth every 4 hours as needed for severe pain Goal 4 per day. Max 6 per day. 50 tablet 0     warfarin ANTICOAGULANT (COUMADIN) 5 MG tablet Take 1 tablet (5 mg) by mouth four times a week Take 5 mg on Tues, Thurs, Sat, Sun. INR check on Tues 11/23 so dose can change. 16 tablet 0     warfarin ANTICOAGULANT (COUMADIN) 7.5 MG tablet Take 1 tablet (7.5 mg) by mouth three times a week Take 7.5 mg on Mon, Wed, Fri. INR check on Tues 11/23 so dose can change. 12 tablet 0     Past Medical History  Past Medical History:   Diagnosis Date     Anxiety      Bleeding disorder (H)      Blood clotting disorder (H)      Cerebral infarction (H) 2015     Cognitive developmental delay     low IQ. 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 (H)     right hand contractures     Iron overload due to repeated red blood cell transfusions      Migraines      Multiple subsegmental pulmonary emboli without acute cor pulmonale (H) 02/01/2021     Oppositional defiant behavior      Superficial venous thrombosis of arm, right 03/25/2021     Uncomplicated asthma      Past Surgical History:   Procedure Laterality Date     AS INSERT TUNNELED CV 2 CATH W/O PORT/PUMP       C BREAST REDUCTION (INCLUDES LIPO) TIER 3 Bilateral 04/23/2019     CHOLECYSTECTOMY       CV RIGHT HEART CATH MEASUREMENTS RECORDED N/A 11/18/2021    Procedure: Right Heart Cath;  Surgeon:  Jackson Stauffer MD;  Location:  HEART CARD[facility] CATH LAB     INSERT PORT VASCULAR ACCESS Left 4/21/2021    Procedure: INSERTION, VASCULAR ACCESS PORT (NOT SURE ON SIDE UNTIL REMOVAL);  Surgeon: Rajan More MD;  Location: [facility] OR     IR CHEST PORT PLACEMENT > 5 YRS OF AGE  4/21/2021     IR CVC NON TUNNEL LINE REMOVAL  5/6/2021     IR CVC NON TUNNEL PLACEMENT  04/07/2020     IR CVC NON TUNNEL PLACEMENT  4/30/2021     IR PORT REMOVAL LEFT  4/21/2021     REMOVE PORT VASCULAR ACCESS Left 4/21/2021    Procedure: REMOVAL, VASCULAR ACCESS PORT LEFT;  Surgeon: Rajan More MD;  Location: [facility] OR     REPAIR TENDON ELBOW Right 10/02/2019    Procedure: Right Elbow Flexor Lengthening, Flexor Pronator Slide Of Wrist and Finger, Thumb Adductor Lengthening;  Surgeon: Anai rFanco MD;  Location: [facility] OR     TONSILLECTOMY Bilateral 10/02/2019    Procedure: Bilateral Tonsillectomy;  Surgeon: Farhana Guy MD;  Location: [facility] OR     Allergies   Allergen Reactions     Contrast Dye      Hives and breathing issues     Fish-Derived Products Hives     Seafood Hives     Diagnostic X-Ray Materials      Gadolinium      Social History   Social History     Tobacco Use     Smoking status: Never Smoker     Smokeless tobacco: Never Used   Substance Use Topics     Alcohol use: Not Currently     Alcohol/week: 0.0 standard drinks     Drug use: Never      Past medical history and social history were reviewed.    Physical Examination:  Vital Signs 11/26/2021   Systolic 127   Diastolic 83   Pulse 106   Temperature 98.6   Respirations 17   Weight (LB)    Height    BMI (Calculated)    Pain    O2 93     Wt Readings from Last 10 Encounters:   11/25/21 77.1 kg (170 lb)   11/24/21 76.5 kg (168 lb 9.6 oz)   11/23/21 76.1 kg (167 lb 11.2 oz)   11/22/21 77.3 kg (170 lb 8 oz)   11/21/21 76.5 kg (168 lb 9.6 oz)   11/03/21 75.8 kg (167 lb 3.2 oz)   11/01/21 75.8 kg (167 lb)   10/19/21 77 kg (169 lb 11.2 oz)   10/13/21 77.2 kg (170 lb 4.8  oz)   09/29/21 77.1 kg (170 lb)     Physical Exam:  Constitutional: Well-appearing female in no acute distress.  Eyes: EOMI, PERRL. No scleral icterus.  ENT: Oral exam deferred   Cardiovascular: Regular rate and rhythm. No murmurs, gallops, or rubs. No peripheral edema.  Respiratory: Clear to auscultation bilaterally. No wheezes or crackles.  Gastrointestinal: Bowel sounds present. Abdomen soft, non-tender. No palpable hepatosplenomegaly or masses.   MSK: Right hand contracture  Skin: No rashes, petechiae, or bruising noted on exposed skin.    Laboratory Data:  Results for HIMANSHU AL (MRN [id number]) as of 11/26/2021 10:19   Ref. Range 11/26/2021 09:25   Sodium Latest Ref Range: 133 - 144 mmol/L 135   Potassium Latest Ref Range: 3.4 - 5.3 mmol/L 3.3 (L)   Chloride Latest Ref Range: 94 - 109 mmol/L 112 (H)   Carbon Dioxide Latest Ref Range: 20 - 32 mmol/L 20   Urea Nitrogen Latest Ref Range: 7 - 30 mg/dL 4 (L)   Creatinine Latest Ref Range: 0.52 - 1.04 mg/dL 0.57   GFR Estimate Latest Ref Range: >60 mL/min/1.73m2 >90   Calcium Latest Ref Range: 8.5 - 10.1 mg/dL 9.3   Anion Gap Latest Ref Range: 3 - 14 mmol/L 3   Albumin Latest Ref Range: 3.4 - 5.0 g/dL 3.7   Protein Total Latest Ref Range: 6.8 - 8.8 g/dL 7.9   Bilirubin Total Latest Ref Range: 0.2 - 1.3 mg/dL 3.8 (H)   Alkaline Phosphatase Latest Ref Range: 40 - 150 U/L 77   ALT Latest Ref Range: 0 - 50 U/L 84 (H)   AST Latest Ref Range: 0 - 45 U/L 95 (H)   Glucose Latest Ref Range: 70 - 99 mg/dL 102 (H)   WBC Latest Ref Range: 4.0 - 11.0 10e3/uL 10.1   Hemoglobin Latest Ref Range: 11.7 - 15.7 g/dL 8.0 (L)   Hematocrit Latest Ref Range: 35.0 - 47.0 % 23.1 (L)   Platelet Count Latest Ref Range: 150 - 450 10e3/uL 526 (H)   RBC Count Latest Ref Range: 3.80 - 5.20 10e6/uL 2.69 (L)   MCV Latest Ref Range: 78 - 100 fL 86   MCH Latest Ref Range: 26.5 - 33.0 pg 29.7   MCHC Latest Ref Range: 31.5 - 36.5 g/dL 34.6   RDW Latest Ref Range: 10.0 - 15.0 % 21.2 (H)   %  Neutrophils Latest Units: % 66   % Lymphocytes Latest Units: % 19   % Monocytes Latest Units: % 9   % Eosinophils Latest Units: % 3   % Basophils Latest Units: % 2   Absolute Basophils Latest Ref Range: 0.0 - 0.2 10e3/uL 0.2   Absolute Eosinophils Latest Ref Range: 0.0 - 0.7 10e3/uL 0.3   Absolute Immature Granulocytes Latest Ref Range: <=0.0 10e3/uL 0.1 (H)   Absolute Lymphocytes Latest Ref Range: 0.8 - 5.3 10e3/uL 1.9   Absolute Monocytes Latest Ref Range: 0.0 - 1.3 10e3/uL 0.9   % Immature Granulocytes Latest Units: % 1   Absolute Neutrophils Latest Ref Range: 1.6 - 8.3 10e3/uL 6.8   Absolute NRBCs Latest Units: 10e3/uL 0.3   NRBCs per 100 WBC Latest Ref Range: <1 /100 3 (H)   INR Latest Ref Range: 0.85 - 1.15  1.30 (H)       Assessment and Plan:  1. Sickle Cell Disease  HgbSS complicated by hx stroke, acute chest, iron overload, chronic pain, acute on chronic pulmonary embolism with multiple hospitalizations. She appears slightly uncomfortable but is in no acute distress. Will proceed with IVF and morphine PRN today in attempt to increase pain control and avoid repeat ED visit.     Labs: Hgb stable. Leukocytosis resolved. CMP at baseline with mild LFT /bili elevation. Overall no acute concerns.      Meds: Stopped Voxeletor. Continue Hydrea 2000mg daily. Contineu Jadenu 4 tablets daily. She is now on Crizanlizumab monthly, continue Desferal every other weekend.     Pain Control: Continue MSContin 15mg BID. Continue Oxycodone to 15mg PRN max 6 per day-#50 tabs per week. DO NOT INCREASE.  Continue Cymbalta 30mg BID. Continue Tylenol PRN.     Follow-up: Continue ANDREAS or MD monthly. Scheduled to see Andrei Garcia PA-C on 11/30-will keep this appointment for short-term follow-up due to pain issues this week. Scheduled to see Dr. Duncan 12/20/21. Due for Jr 12/22/21.     2. Neuro  History of stroke. Off ASA now that she is on warfarin.      Dr. Pierce doing botox for spasticity and symptoms  improving.      Migraines, chronic issue, saw neurology. Has follow-up with specialist scheduled in Jan.     3. Psych  History of anxiety and depression. Doing well with Cymbalta and Abilify.      Follows with outside therapist.     OK to use Benadryl PRN insomnia.      4. Pulm  History of asthma, continue Symbicort and Albuterol PRN. Still needs to see pulm, not discussed today. Has home O2. O2 sats stable on RA in clinic today.    5. Vascular  Acute on chronic PE with DOAC failure, now on warfarin. INR subtherapeutic today. Anticoagulation clinic managing warfarin/INR checks. Today she will take 15 mg today otherwise begin 7.5mg daily. Echo and RHC reassuring. She has home O2.    6. FEN  K 3.3, replace orally in clinic today    35 minutes spent on the date of the encounter doing chart review, review of test results, interpretation of tests, patient visit and documentation     Patricia Mantilla CNP  [facility]  [address] [place] [address] [id number] [phone]

## 2021-11-26 NOTE — TELEPHONE ENCOUNTER
Advised pt to ask [facility] nurse if they're able to give her her injection now, or reschedule for after 2pm when she is done with infusion, she stated she would as she is on the 4th floor now.

## 2021-11-26 NOTE — PROGRESS NOTES
ANTICOAGULATION MANAGEMENT     Jennifer Cervantes 21 year old female is on warfarin with subtherapeutic INR result. (Goal INR 2.0-3.0)    Recent labs: (last 7 days)     11/26/21  0925   INR 1.30*       ASSESSMENT     Source(s): Chart Review and Patient/Caregiver Call       Warfarin doses taken: Warfarin taken as instructed    Diet: No new diet changes identified    New illness, injury, or hospitalization: Yes: Patient is in the ER for sickle cell crisis and needed pain meds    Medication/supplement changes: None noted    Signs or symptoms of bleeding or clotting: No    Previous INR: Subtherapeutic    Additional findings: None     PLAN     Recommended plan for no diet, medication or health factor changes affecting INR     Dosing Instructions: Booster dose then Increase your warfarin dose (23% change) with next INR in 3 days       Summary  As of 11/26/2021    Full warfarin instructions:  11/26: 15 mg; Otherwise 7.5 mg every day   Next INR check:  11/29/2021             Telephone call with Jennifer who verbalizes understanding and agrees to plan and who agrees to plan and repeated back plan correctly    Check at provider office visit    Education provided: Goal range and significance of current result, Importance of therapeutic range, Importance of following up at instructed interval and Importance of taking warfarin as instructed    Plan made per [facility] anticoagulation protocol    Gita Khan RN  Anticoagulation Clinic  11/26/2021    _______________________________________________________________________     Anticoagulation Episode Summary     Current INR goal:  2.0-3.0   TTR:  --   Target end date:  Indefinite   Send INR reminders to:  [facility]    Indications    Chronic pulmonary embolism without acute cor pulmonale  unspecified pulmonary embolism type (H) [I27.82]           Comments:  [facility] [phone]         Anticoagulation Care Providers     Provider Role Specialty Phone number    Perla  Eric MARK MD Referring Pediatric Hematology-Oncology [phone]

## 2021-11-26 NOTE — PROGRESS NOTES
This is a recent snapshot of the patient's [facility] medical record.  For current drug dose and complete information and questions, call [phone] or In Basket pool, fv home infusion ([id number])  CSN Number:  [id number]

## 2021-11-26 NOTE — CONFIDENTIAL NOTE
"Pt called in to triage requesting IVF pain meds for lower back pain rated 9/10 x 1 days. Stated last took prn oxycodone at 6 AM  this morning without relief. Denied any fevers, chills, cough, sob, chest pain, numbness or tingling or other symptoms. Pt's last infusion was 11/25/21 was in ED, last clinic visit 11/22/21 with follow-up on 11/30/21 with Andrei HIGGINS.   Patient states they do not have own ride and it will take 60 minutes long to get to cancer clinic.   Pt denied being out of home medications and needing any refills today.   7:12 Paged MAURISIO Roth   8:17 Paged Dr Duncan   9:08 paged Dr Duncan   Please note, if you are late for your appt, you risk losing your infusion appt as it may delay another patient's infusion who arrived on time\".   "

## 2021-11-26 NOTE — TELEPHONE ENCOUNTER
Writer spoke with pt, she stated she is already at the clinic for labs and another apt right now. Stated symptoms not improved from ED visit last night, but not worse. Denied sob or chest pain.    Rx for oxy written 11/24 not picked up from the pharmacy yet. Discussed with Patricia Mantilla ANDREAS, ok to add on for ivf pain meds, will see pt in infusion. Scheduling messaged.

## 2021-11-26 NOTE — PATIENT INSTRUCTIONS
Dear Jennifer Cervantes    Thank you for choosing [facility] ([facility]) for your infusion.  The following information is a summary of our appointment as well as important reminders.      Patient Education     Morphine Injection 2 mg/mL  Uses  This medicine is used for the following purposes:    pain    fluid in lungs    drug dependence  Instructions  Carefully follow the instructions for preparing this medicine before injection.  Read and make sure you understand the instructions for measuring your dose and using the syringe before using this medicine.  Always inspect the medicine before using.  The liquid should be clear or light yellow.  Do not use the medicine if it contains any particles or if it has changed color.  Protect medicine from light.  Speak with your nurse or pharmacist about how long the medicine can be stored safely at room temperature or in the refrigerator before it needs to be discarded.  Never use any medicine that has .  Please ask your doctor, nurse, or pharmacist how to discard unused medicines safely.  Ask your doctor, nurse or pharmacist to show you how to use this medicine correctly.  If you forget to take a dose on time, take it as soon as you remember. If it is almost time for the next dose, do not take the missed dose. Return to your normal dosing schedule. Do not take 2 doses of this medicine at one time.  Please tell your doctor and pharmacist about all the medicines you take. Include both prescription and over-the-counter medicines. Also tell them about any vitamins, herbal medicines, or anything else you take for your health.  If your symptoms do not improve or they worsen while on this medicine, contact your doctor.  To reduce constipation, eat high fiber foods, drink plenty of water and exercise.  Do not suddenly stop taking this medicine. Check with your doctor before stopping.  If you need to stop this medicine, your  doctor may wish to gradually reduce the dosage before stopping.  It is very important that you follow your doctor's instructions for all blood tests.  Cautions  This medicine contains an opioid. Though it helps many people, this medicine may sometimes cause addiction, especially if it is used for a long time. This risk for addiction may be higher if you have a substance use disorder - such as overuse of or addiction to drugs or alcohol. Speak with your doctor about the benefits and risks of using this medicine.  Ask your doctor or pharmacist if you should have naloxone on hand to treat opioid overdose. Teach your family or household members about the signs of an opioid overdose and how to treat it.  This medicine can be habit-forming. If you use this medicine regularly for a long time, it can lead to withdrawal symptoms when you stop. Please use this medicine only as directed.  Tell your doctor and pharmacist if you ever had an allergic reaction to a medicine. Symptoms of an allergic reaction can include trouble breathing, skin rash, itching, swelling, or severe dizziness.  Some patients with weak hearts may have worsening of symptoms. If you notice difficulty breathing, weight gain, or swelling of your legs or ankles, let your doctor know right away.  Some patients taking this medicine have experienced serious side effects. Please speak with your doctor to understand the risks and benefits associated with this medicine.  Do not use the medication any more than instructed.  This medicine may cause dizziness or fainting, especially after exercising or in hot weather. Be very careful when standing or sitting up quickly.  Your ability to stay alert or to react quickly may be impaired by this medicine. Do not operate machinery or drive while on this medicine.  Do not drink beverages with alcohol while on this medicine.  If possible, avoid using with marijuana or other medicines that can cause dizziness or drowsiness.  These include allergy/cold products, muscle relaxers, sleep aids, and pain relievers.  Call the doctor if there are any signs of confusion or unusual changes in behavior.  Tell the doctor or pharmacist if you are pregnant, planning to be pregnant, or breastfeeding.  Ask your pharmacist if this medicine can interact with any of your other medicines. Be sure to tell them about all the medicines you take.  Please tell all your doctors and dentists that you are on this medicine before they provide care.  Do not start or stop any other medicines without first speaking to your doctor or pharmacist.  This medicine should be used with caution in patients with breathing difficulties.  Call your doctor right away if you notice slow or shallow breathing.  Used needles and syringes should be thrown away properly in a medical waste container. Ask your doctor or pharmacist if you need help.  Do not share this medicine with anyone who has not been prescribed this medicine.  Side Effects  The following is a list of some common side effects from this medicine. Please speak with your doctor about what you should do if you experience these or other side effects.    constipation    dizziness    drowsiness or sedation    dry mouth    reaction at the area of the injection (pain, redness, swelling)    nausea    sweating    vomiting  Call your doctor or get medical help right away if you notice any of these more serious side effects:    decreased appetite    breathing interruption during sleep    shallow, irregular breathing    changes in memory, mood, or thinking    difficulty concentrating    fainting    hallucinations (unusual thoughts, seeing or hearing things that are not real)    fast or irregular heart beats    slow heartbeat    seizures    shortness of breath    severe stomach or bowel pain    unusual or unexplained tiredness or weakness    difficulty or discomfort urinating    blurring or changes of vision    weight loss  A few  people may have an allergic reactions to this medicine. Symptoms can include difficulty breathing, skin rash, itching, swelling, or severe dizziness. If you notice any of these symptoms, seek medical help quickly.  Extra  Please speak with your doctor, nurse, or pharmacist if you have any questions about this medicine.  [email or web address]  IMPORTANT NOTE: This document tells you briefly how to take your medicine, but it does not tell you all there is to know about it.Your doctor or pharmacist may give you other documents about your medicine. Please talk to them if you have any questions.Always follow their advice. There is a more complete description of this medicine available in English.Scan this code on your smartphone or tablet or use the web address below. You can also ask your pharmacist for a printout. If you have any questions, please ask your pharmacist.     2021 [employer].             We look forward in seeing you on your next appointment here at [facility] ([facility]).  Please don t hesitate to call us at [phone] to reschedule any of your appointments or to speak with one of the [facility] registered nurses.  It was a pleasure taking care of you today.    Sincerely,    [facility]  [facility]  [address] [place] [address]  [id number]  Phone:  [phone]

## 2021-11-26 NOTE — LETTER
11/26/2021         RE: Jennifer Cervantes  [address]  [facility] [address]        Dear Colleague,    Thank you for referring your patient, Jennifer Cervantes, to the [facility]. Please see a copy of my visit note below.    Nursing Note  Jennifer Cervantes [facility] for:   Chief Complaint   Patient presents with     Infusion     IV fluids and pain medications     During today's [facility] appointment, orders from Dr Duncan were completed.  Frequency: as needed    Progress note:  Patient identification verified by name and date of birth.  Assessment completed.  Vitals recorded in Doc Flowsheets.  Patient was provided with education regarding medication/procedure and possible side effects.  Patient verbalized understanding.     present during visit today: Not Applicable.    Treatment Conditions: infusion and pain medications approved by sickle cell team.     Premedications: were not ordered.    Drug Waste Record: No    Infusion length and rate: IV fluids given over approximately 2 hours. Morphine given 3 times, administered slow IV push.     Labs: were not ordered for this appointment.    Vascular access: port accessed today.    Is the next appt scheduled? As needed, will call  Asymptomatic COVID test completed? no    Post Infusion Assessment:  Patient tolerated infusion without incident.  Blood return noted pre and post infusion.  Access discontinued per protocol.     Discharge Plan:   Follow up plan of care with: ordering provider as scheduled. and after visit summary given to patient  Discharge instructions were reviewed with patient.  Patient/representative verbalized understanding of discharge instructions and all questions answered.  Patient discharged [facility] in stable condition.    Cristela Henry RN    Administrations This Visit      dextrose 5% and 0.45% NaCl BOLUS     Admin Date  11/26/2021 Action  New Bag Dose  500 mL Rate  250 mL/hr Route  Intravenous Administered By  Cristela Henry RN          heparin 100 UNIT/ML injection 5 mL     Admin Date  11/26/2021 Action  Given Dose  5 mL Route  Intracatheter Administered By  Patricia Qureshi RN          morphine (PF) injection 2 mg     Admin Date  11/26/2021 Action  Given Dose  2 mg Route  Intravenous Administered By  Cristela Henry RN           Admin Date  11/26/2021 Action  Given Dose  2 mg Route  Intravenous Administered By  Cristela Henry RN           Admin Date  11/26/2021 Action  Given Dose  2 mg Route  Intravenous Administered By  Cristela Henry RN          potassium chloride ER (KLOR-CON M) CR tablet 20 mEq     Admin Date  11/26/2021 Action  Given Dose  20 mEq Route  Oral Administered By  Patricia Qureshi RN                /83   Pulse 106   Temp 98.6  F (37  C) (Oral)   Resp 17   SpO2 93%         Again, thank you for allowing me to participate in the care of your patient.        Sincerely,        [facility]

## 2021-11-26 NOTE — LETTER
"    11/26/2021         RE: Jennifer Cervantes  [address]  [facility] [address]        Dear Colleague,    Thank you for referring your patient, Jennifer Cervantes, to the [facility]. Please see a copy of my visit note below.    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      She was admitted 2/1/21-2/3/21 with a new PE, started on Rivaroxaban. Switched to Eliquis 3/25/21 with RUE DVT.     She was admitted 4/26/21-5/11/21 with sickle cell pain crisis complicated by worsening PE in setting of low Apixaban levels, acute hypoxic respiratory failure, pneumonia, acute chest syndrome s/p exchange transfusion on 4/30 and 5/4. After 2nd exchange her oxygen requirement dramatically improved from 20L to 1-2L 5/5 and she was off oxygen as of 5/6.      She was admitted 7/13/21-7/25/21 for acute on chronic PE, switched to dabigitran + ASA.      Due to worsening hypoxia she underwent VQ scan 11/10/21 which showed acute on chronic PE for which she was admitted 11/10-11/21. During admission was switched to warfarin. Echo WNL and no signs of pulm HTN on right heart cath. She did receive 1 unit pRBC for hgb 6.4 during admission.    Interval History:  Jennifer was seen today for evaluation of acute sickle cell pain. She was in the ED for pain last night. Reports pain is \"all over\".  States her pain has not worsened but also has not improved since her ED visit. She took oxycodone this morning with no relief. Was in the clinic " for a separate appointment today and reached out see if she could get in for fluids. She's had a difficult week with pain which she relates to the recent colder temperatures.    She's taking her coumadin daily without missed doses. No concerning bleeding or bruising. Denies shortness of breath, cough, dizziness or lightheadedness. No recent headaches. She and her family are activity moving from [place] to [facility]. She has been busy packing.     Current Outpatient Medications   Medication Sig Dispense Refill     acetaminophen (TYLENOL) 325 MG tablet Take 2 tablets (650 mg) by mouth every 6 hours as needed for mild pain 120 tablet 3     albuterol (PROAIR HFA/PROVENTIL HFA/VENTOLIN HFA) 108 (90 Base) MCG/ACT inhaler Inhale 2 puffs into the lungs every 6 hours as needed for shortness of breath / dyspnea or wheezing 8.5 g 3     albuterol (PROVENTIL) (2.5 MG/3ML) 0.083% neb solution Take 1 vial (2.5 mg) by nebulization every 6 hours as needed for shortness of breath / dyspnea or wheezing 12 mL 4     ARIPiprazole (ABILIFY) 2 MG tablet Take 1 tablet (2 mg) by mouth daily 30 tablet 3     budesonide-formoterol (SYMBICORT) 160-4.5 MCG/ACT Inhaler Inhale 2 puffs into the lungs 2 times daily 10.2 g 3     diphenhydrAMINE (BENADRYL) 25 MG capsule Take 1-2 capsules (25-50 mg) by mouth nightly as needed for sleep 60 capsule 3     DULoxetine (CYMBALTA) 30 MG capsule Take 1 capsule (30 mg) by mouth 2 times daily 60 capsule 3     EPINEPHrine (ANY BX GENERIC EQUIV) 0.3 MG/0.3ML injection 2-pack Inject 0.3 mLs (0.3 mg) into the muscle as needed for anaphylaxis 1 each 1     Hydroxyurea 1000 MG TABS Take 2,000 mg by mouth daily 60 tablet 3     hydrOXYzine (ATARAX) 25 MG tablet Take 1 tablet (25 mg) by mouth 3 times daily as needed for anxiety 30 tablet 1     JADENU 360 MG tablet Take 4 tablets (1,440 mg) by mouth every evening 120 tablet 4     lidocaine-prilocaine (EMLA) 2.5-2.5 % external cream Apply topically once for 1 dose  Use for port site as needed (Patient not taking: Reported on 11/10/2021) 30 g 1     medroxyPROGESTERone (DEPO-PROVERA) 150 MG/ML IM injection Inject 150 mg into the muscle       morphine (MS CONTIN) 15 MG CR tablet Take 1 tablet (15 mg) by mouth every 12 hours 60 tablet 0     naloxone (NARCAN) 4 MG/0.1ML nasal spray Spray 1 spray (4 mg) into one nostril alternating nostrils once as needed for opioid reversal every 2-3 minutes until assistance arrives 0.2 mL 1     omeprazole (PRILOSEC) 20 MG DR capsule Take 1 capsule (20 mg) by mouth daily 30 capsule 1     ondansetron (ZOFRAN) 8 MG tablet Take 1 tablet (8 mg) by mouth every 8 hours as needed 30 tablet 1     oxyCODONE IR (ROXICODONE) 15 MG tablet Take 1 tablet (15 mg) by mouth every 4 hours as needed for severe pain Goal 4 per day. Max 6 per day. 50 tablet 0     warfarin ANTICOAGULANT (COUMADIN) 5 MG tablet Take 1 tablet (5 mg) by mouth four times a week Take 5 mg on Tues, Thurs, Sat, Sun. INR check on Tues 11/23 so dose can change. 16 tablet 0     warfarin ANTICOAGULANT (COUMADIN) 7.5 MG tablet Take 1 tablet (7.5 mg) by mouth three times a week Take 7.5 mg on Mon, Wed, Fri. INR check on Tues 11/23 so dose can change. 12 tablet 0     Past Medical History  Past Medical History:   Diagnosis Date     Anxiety      Bleeding disorder (H)      Blood clotting disorder (H)      Cerebral infarction (H) 2015     Cognitive developmental delay     low IQ. 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 (H)     right hand contractures     Iron overload due to repeated red blood cell transfusions      Migraines      Multiple subsegmental pulmonary emboli without acute cor pulmonale (H) 02/01/2021     Oppositional defiant behavior      Superficial venous thrombosis of arm, right 03/25/2021     Uncomplicated asthma      Past Surgical History:   Procedure  Laterality Date     AS INSERT TUNNELED CV 2 CATH W/O PORT/PUMP       C BREAST REDUCTION (INCLUDES LIPO) TIER 3 Bilateral 04/23/2019     CHOLECYSTECTOMY       CV RIGHT HEART CATH MEASUREMENTS RECORDED N/A 11/18/2021    Procedure: Right Heart Cath;  Surgeon: Jackson Stauffer MD;  Location:  [facility]     INSERT PORT VASCULAR ACCESS Left 4/21/2021    Procedure: INSERTION, VASCULAR ACCESS PORT (NOT SURE ON SIDE UNTIL REMOVAL);  Surgeon: Rajan More MD;  Location: [facility] OR     IR CHEST PORT PLACEMENT > 5 YRS OF AGE  4/21/2021     IR CVC NON TUNNEL LINE REMOVAL  5/6/2021     IR CVC NON TUNNEL PLACEMENT  04/07/2020     IR CVC NON TUNNEL PLACEMENT  4/30/2021     IR PORT REMOVAL LEFT  4/21/2021     REMOVE PORT VASCULAR ACCESS Left 4/21/2021    Procedure: REMOVAL, VASCULAR ACCESS PORT LEFT;  Surgeon: Rajan More MD;  Location: [facility] OR     REPAIR TENDON ELBOW Right 10/02/2019    Procedure: Right Elbow Flexor Lengthening, Flexor Pronator Slide Of Wrist and Finger, Thumb Adductor Lengthening;  Surgeon: Anai Franco MD;  Location: [facility]     TONSILLECTOMY Bilateral 10/02/2019    Procedure: Bilateral Tonsillectomy;  Surgeon: Farhana Guy MD;  Location: [facility] OR     Allergies   Allergen Reactions     Contrast Dye      Hives and breathing issues     Fish-Derived Products Hives     Seafood Hives     Diagnostic X-Ray Materials      Gadolinium      Social History   Social History     Tobacco Use     Smoking status: Never Smoker     Smokeless tobacco: Never Used   Substance Use Topics     Alcohol use: Not Currently     Alcohol/week: 0.0 standard drinks     Drug use: Never      Past medical history and social history were reviewed.    Physical Examination:  Vital Signs 11/26/2021   Systolic 127   Diastolic 83   Pulse 106   Temperature 98.6   Respirations 17   Weight (LB)    Height    BMI (Calculated)    Pain    O2 93     Wt Readings from Last 10 Encounters:   11/25/21 77.1 kg (170 lb)   11/24/21  76.5 kg (168 lb 9.6 oz)   11/23/21 76.1 kg (167 lb 11.2 oz)   11/22/21 77.3 kg (170 lb 8 oz)   11/21/21 76.5 kg (168 lb 9.6 oz)   11/03/21 75.8 kg (167 lb 3.2 oz)   11/01/21 75.8 kg (167 lb)   10/19/21 77 kg (169 lb 11.2 oz)   10/13/21 77.2 kg (170 lb 4.8 oz)   09/29/21 77.1 kg (170 lb)     Physical Exam:  Constitutional: Well-appearing female in no acute distress.  Eyes: EOMI, PERRL. No scleral icterus.  ENT: Oral exam deferred   Cardiovascular: Regular rate and rhythm. No murmurs, gallops, or rubs. No peripheral edema.  Respiratory: Clear to auscultation bilaterally. No wheezes or crackles.  Gastrointestinal: Bowel sounds present. Abdomen soft, non-tender. No palpable hepatosplenomegaly or masses.   MSK: Right hand contracture  Skin: No rashes, petechiae, or bruising noted on exposed skin.    Laboratory Data:  Results for HIMANSHU AL (MRN [id number]) as of 11/26/2021 10:19   Ref. Range 11/26/2021 09:25   Sodium Latest Ref Range: 133 - 144 mmol/L 135   Potassium Latest Ref Range: 3.4 - 5.3 mmol/L 3.3 (L)   Chloride Latest Ref Range: 94 - 109 mmol/L 112 (H)   Carbon Dioxide Latest Ref Range: 20 - 32 mmol/L 20   Urea Nitrogen Latest Ref Range: 7 - 30 mg/dL 4 (L)   Creatinine Latest Ref Range: 0.52 - 1.04 mg/dL 0.57   GFR Estimate Latest Ref Range: >60 mL/min/1.73m2 >90   Calcium Latest Ref Range: 8.5 - 10.1 mg/dL 9.3   Anion Gap Latest Ref Range: 3 - 14 mmol/L 3   Albumin Latest Ref Range: 3.4 - 5.0 g/dL 3.7   Protein Total Latest Ref Range: 6.8 - 8.8 g/dL 7.9   Bilirubin Total Latest Ref Range: 0.2 - 1.3 mg/dL 3.8 (H)   Alkaline Phosphatase Latest Ref Range: 40 - 150 U/L 77   ALT Latest Ref Range: 0 - 50 U/L 84 (H)   AST Latest Ref Range: 0 - 45 U/L 95 (H)   Glucose Latest Ref Range: 70 - 99 mg/dL 102 (H)   WBC Latest Ref Range: 4.0 - 11.0 10e3/uL 10.1   Hemoglobin Latest Ref Range: 11.7 - 15.7 g/dL 8.0 (L)   Hematocrit Latest Ref Range: 35.0 - 47.0 % 23.1 (L)   Platelet Count Latest Ref Range: 150 - 450 10e3/uL  526 (H)   RBC Count Latest Ref Range: 3.80 - 5.20 10e6/uL 2.69 (L)   MCV Latest Ref Range: 78 - 100 fL 86   MCH Latest Ref Range: 26.5 - 33.0 pg 29.7   MCHC Latest Ref Range: 31.5 - 36.5 g/dL 34.6   RDW Latest Ref Range: 10.0 - 15.0 % 21.2 (H)   % Neutrophils Latest Units: % 66   % Lymphocytes Latest Units: % 19   % Monocytes Latest Units: % 9   % Eosinophils Latest Units: % 3   % Basophils Latest Units: % 2   Absolute Basophils Latest Ref Range: 0.0 - 0.2 10e3/uL 0.2   Absolute Eosinophils Latest Ref Range: 0.0 - 0.7 10e3/uL 0.3   Absolute Immature Granulocytes Latest Ref Range: <=0.0 10e3/uL 0.1 (H)   Absolute Lymphocytes Latest Ref Range: 0.8 - 5.3 10e3/uL 1.9   Absolute Monocytes Latest Ref Range: 0.0 - 1.3 10e3/uL 0.9   % Immature Granulocytes Latest Units: % 1   Absolute Neutrophils Latest Ref Range: 1.6 - 8.3 10e3/uL 6.8   Absolute NRBCs Latest Units: 10e3/uL 0.3   NRBCs per 100 WBC Latest Ref Range: <1 /100 3 (H)   INR Latest Ref Range: 0.85 - 1.15  1.30 (H)       Assessment and Plan:  1. Sickle Cell Disease  HgbSS complicated by hx stroke, acute chest, iron overload, chronic pain, acute on chronic pulmonary embolism with multiple hospitalizations. She appears slightly uncomfortable but is in no acute distress. Will proceed with IVF and morphine PRN today in attempt to increase pain control and avoid repeat ED visit.     Labs: Hgb stable. Leukocytosis resolved. CMP at baseline with mild LFT /bili elevation. Overall no acute concerns.      Meds: Stopped Voxeletor. Continue Hydrea 2000mg daily. Contineu Jadenu 4 tablets daily. She is now on Crizanlizumab monthly, continue Desferal every other weekend.     Pain Control: Continue MSContin 15mg BID. Continue Oxycodone to 15mg PRN max 6 per day-#50 tabs per week. DO NOT INCREASE.  Continue Cymbalta 30mg BID. Continue Tylenol PRN.     Follow-up: Continue ANDREAS or MD monthly. Scheduled to see Andrei Garcia PA-C on 11/30-will keep this appointment for short-term  follow-up due to pain issues this week. Scheduled to see Dr. Duncan 12/20/21. Due for Jr 12/22/21.     2. Neuro  History of stroke. Off ASA now that she is on warfarin.      Dr. Pierce doing botox for spasticity and symptoms improving.      Migraines, chronic issue, saw neurology. Has follow-up with specialist scheduled in Jan.     3. Psych  History of anxiety and depression. Doing well with Cymbalta and Abilify.      Follows with outside therapist.     OK to use Benadryl PRN insomnia.      4. Pulm  History of asthma, continue Symbicort and Albuterol PRN. Still needs to see pulm, not discussed today. Has home O2. O2 sats stable on RA in clinic today.    5. Vascular  Acute on chronic PE with DOAC failure, now on warfarin. INR subtherapeutic today. Anticoagulation clinic managing warfarin/INR checks. Today she will take 15 mg today otherwise begin 7.5mg daily. Echo and RHC reassuring. She has home O2.    6. FEN  K 3.3, replace orally in clinic today    35 minutes spent on the date of the encounter doing chart review, review of test results, interpretation of tests, patient visit and documentation     Patricia Mantilla, CNP  [facility]  [address] [place] [id number] [phone]

## 2021-11-26 NOTE — PROGRESS NOTES
Nursing Note  Jennifer Cervantes [facility] for:   Chief Complaint   Patient presents with     Infusion     IV fluids and pain medications     During [facility]'s [facility] appointment, orders from Dr Duncan were completed.  Frequency: as needed    Progress note:  Patient identification verified by name and date of birth.  Assessment completed.  Vitals recorded in Doc Flowsheets.  Patient was provided with education regarding medication/procedure and possible side effects.  Patient verbalized understanding.     present during visit today: Not Applicable.    Treatment Conditions: infusion and pain medications approved by sickle cell team.     Premedications: were not ordered.    Drug Waste Record: No    Infusion length and rate: IV fluids given over approximately 2 hours. Morphine given 3 times, administered slow IV push.     Labs: were not ordered for this appointment.    Vascular access: port accessed today.    Is the next appt scheduled? As needed, will call  Asymptomatic COVID test completed? no    Post Infusion Assessment:  Patient tolerated infusion without incident.  Blood return noted pre and post infusion.  Access discontinued per protocol.     Discharge Plan:   Follow up plan of care with: ordering provider as scheduled. and after visit summary given to patient  Discharge instructions were reviewed with patient.  Patient/representative verbalized understanding of discharge instructions and all questions answered.  [facility] discharged [facility] in stable condition.    Cristela Henry RN    Administrations This Visit     dextrose 5% and 0.45% NaCl BOLUS     Admin Date  11/26/2021 Action  New Bag Dose  500 mL Rate  250 mL/hr Route  Intravenous Administered By  Cristela Henry RN          heparin 100 UNIT/ML injection 5 mL     Admin Date  11/26/2021 Action  Given  Dose  5 mL Route  Intracatheter Administered By  Patricia Qureshi RN          morphine (PF) injection 2 mg     Admin Date  11/26/2021 Action  Given Dose  2 mg Route  Intravenous Administered By  Cristela Henry RN           Admin Date  11/26/2021 Action  Given Dose  2 mg Route  Intravenous Administered By  Cristela Henry RN           Admin Date  11/26/2021 Action  Given Dose  2 mg Route  Intravenous Administered By  Cristela Henry RN          potassium chloride ER (KLOR-CON M) CR tablet 20 mEq     Admin Date  11/26/2021 Action  Given Dose  20 mEq Route  Oral Administered By  Patricia Qureshi RN                /83   Pulse 106   Temp 98.6  F (37  C) (Oral)   Resp 17   SpO2 93%

## 2021-11-28 ENCOUNTER — HOME INFUSION (PRE-WILLOW HOME INFUSION) (OUTPATIENT)
Dept: PHARMACY | Facility: CLINIC | Age: 21
End: 2021-11-28

## 2021-11-28 ENCOUNTER — HOSPITAL ENCOUNTER (EMERGENCY)
Facility: CLINIC | Age: 21
Discharge: HOME | End: 2021-11-28
Attending: EMERGENCY MEDICINE | Admitting: EMERGENCY MEDICINE
Payer: COMMERCIAL

## 2021-11-28 VITALS
OXYGEN SATURATION: 100 % | BODY MASS INDEX: 29.02 KG/M2 | SYSTOLIC BLOOD PRESSURE: 122 MMHG | HEIGHT: 64 IN | HEART RATE: 100 BPM | TEMPERATURE: 98.3 F | WEIGHT: 170 LBS | DIASTOLIC BLOOD PRESSURE: 71 MMHG | RESPIRATION RATE: 18 BRPM

## 2021-11-28 DIAGNOSIS — D57.00 SICKLE CELL PAIN CRISIS (H): ICD-10-CM

## 2021-11-28 LAB
ALBUMIN SERPL-MCNC: 3.9 G/DL (ref 3.4–5)
ALP SERPL-CCNC: 68 U/L (ref 40–150)
ALT SERPL W P-5'-P-CCNC: 70 U/L (ref 0–50)
ANION GAP SERPL CALCULATED.3IONS-SCNC: 6 MMOL/L (ref 3–14)
AST SERPL W P-5'-P-CCNC: 68 U/L (ref 0–45)
BASOPHILS # BLD AUTO: 0.3 10E3/UL (ref 0–0.2)
BASOPHILS NFR BLD AUTO: 2 %
BILIRUB SERPL-MCNC: 2.4 MG/DL (ref 0.2–1.3)
BUN SERPL-MCNC: 10 MG/DL (ref 7–30)
CALCIUM SERPL-MCNC: 8.5 MG/DL (ref 8.5–10.1)
CHLORIDE BLD-SCNC: 110 MMOL/L (ref 94–109)
CO2 SERPL-SCNC: 22 MMOL/L (ref 20–32)
CREAT SERPL-MCNC: 0.59 MG/DL (ref 0.52–1.04)
EOSINOPHIL # BLD AUTO: 0.4 10E3/UL (ref 0–0.7)
EOSINOPHIL NFR BLD AUTO: 3 %
ERYTHROCYTE [DISTWIDTH] IN BLOOD BY AUTOMATED COUNT: 20.4 % (ref 10–15)
GFR SERPL CREATININE-BSD FRML MDRD: >90 ML/MIN/1.73M2
GLUCOSE BLD-MCNC: 103 MG/DL (ref 70–99)
HCT VFR BLD AUTO: 21.8 % (ref 35–47)
HGB BLD-MCNC: 7.5 G/DL (ref 11.7–15.7)
HOLD SPECIMEN: NORMAL
HOLD SPECIMEN: NORMAL
IMM GRANULOCYTES # BLD: 0.1 10E3/UL
IMM GRANULOCYTES NFR BLD: 0 %
LYMPHOCYTES # BLD AUTO: 2 10E3/UL (ref 0.8–5.3)
LYMPHOCYTES NFR BLD AUTO: 17 %
MCH RBC QN AUTO: 30.1 PG (ref 26.5–33)
MCHC RBC AUTO-ENTMCNC: 34.4 G/DL (ref 31.5–36.5)
MCV RBC AUTO: 88 FL (ref 78–100)
MONOCYTES # BLD AUTO: 1 10E3/UL (ref 0–1.3)
MONOCYTES NFR BLD AUTO: 9 %
NEUTROPHILS # BLD AUTO: 8 10E3/UL (ref 1.6–8.3)
NEUTROPHILS NFR BLD AUTO: 69 %
NRBC # BLD AUTO: 0.1 10E3/UL
NRBC BLD AUTO-RTO: 1 /100
PLATELET # BLD AUTO: 498 10E3/UL (ref 150–450)
POTASSIUM BLD-SCNC: 3.7 MMOL/L (ref 3.4–5.3)
PROT SERPL-MCNC: 7.9 G/DL (ref 6.8–8.8)
RBC # BLD AUTO: 2.49 10E6/UL (ref 3.8–5.2)
RETICS # AUTO: 0.38 10E6/UL (ref 0.03–0.1)
RETICS/RBC NFR AUTO: 15.6 % (ref 0.5–2)
SODIUM SERPL-SCNC: 138 MMOL/L (ref 133–144)
WBC # BLD AUTO: 11.7 10E3/UL (ref 4–11)

## 2021-11-28 PROCEDURE — 96374 THER/PROPH/DIAG INJ IV PUSH: CPT | Performed by: EMERGENCY MEDICINE

## 2021-11-28 PROCEDURE — 258N000003 HC RX IP 258 OP 636: Performed by: EMERGENCY MEDICINE

## 2021-11-28 PROCEDURE — 85004 AUTOMATED DIFF WBC COUNT: CPT | Performed by: EMERGENCY MEDICINE

## 2021-11-28 PROCEDURE — 250N000011 HC RX IP 250 OP 636: Performed by: EMERGENCY MEDICINE

## 2021-11-28 PROCEDURE — 96375 TX/PRO/DX INJ NEW DRUG ADDON: CPT | Performed by: EMERGENCY MEDICINE

## 2021-11-28 PROCEDURE — 96361 HYDRATE IV INFUSION ADD-ON: CPT | Performed by: EMERGENCY MEDICINE

## 2021-11-28 PROCEDURE — 250N000013 HC RX MED GY IP 250 OP 250 PS 637: Performed by: EMERGENCY MEDICINE

## 2021-11-28 PROCEDURE — 99285 EMERGENCY DEPT VISIT HI MDM: CPT | Performed by: EMERGENCY MEDICINE

## 2021-11-28 PROCEDURE — 82040 ASSAY OF SERUM ALBUMIN: CPT | Performed by: EMERGENCY MEDICINE

## 2021-11-28 PROCEDURE — 36415 COLL VENOUS BLD VENIPUNCTURE: CPT | Performed by: EMERGENCY MEDICINE

## 2021-11-28 PROCEDURE — 85045 AUTOMATED RETICULOCYTE COUNT: CPT | Performed by: EMERGENCY MEDICINE

## 2021-11-28 PROCEDURE — 96376 TX/PRO/DX INJ SAME DRUG ADON: CPT | Performed by: EMERGENCY MEDICINE

## 2021-11-28 PROCEDURE — 99285 EMERGENCY DEPT VISIT HI MDM: CPT | Mod: 25 | Performed by: EMERGENCY MEDICINE

## 2021-11-28 RX ORDER — MORPHINE SULFATE 2 MG/ML
2 INJECTION, SOLUTION INTRAMUSCULAR; INTRAVENOUS ONCE
Status: COMPLETED | OUTPATIENT
Start: 2021-11-28 | End: 2021-11-28

## 2021-11-28 RX ORDER — KETOROLAC TROMETHAMINE 15 MG/ML
15 INJECTION, SOLUTION INTRAMUSCULAR; INTRAVENOUS ONCE
Status: DISCONTINUED | OUTPATIENT
Start: 2021-11-28 | End: 2021-11-28

## 2021-11-28 RX ORDER — ONDANSETRON 2 MG/ML
8 INJECTION INTRAMUSCULAR; INTRAVENOUS
Status: DISCONTINUED | OUTPATIENT
Start: 2021-11-28 | End: 2021-11-28 | Stop reason: HOSPADM

## 2021-11-28 RX ORDER — OXYCODONE HYDROCHLORIDE 10 MG/1
20 TABLET ORAL ONCE
Status: COMPLETED | OUTPATIENT
Start: 2021-11-28 | End: 2021-11-28

## 2021-11-28 RX ORDER — KETOROLAC TROMETHAMINE 30 MG/ML
15 INJECTION, SOLUTION INTRAMUSCULAR; INTRAVENOUS ONCE
Status: DISCONTINUED | OUTPATIENT
Start: 2021-11-28 | End: 2021-11-28

## 2021-11-28 RX ORDER — HEPARIN SODIUM (PORCINE) LOCK FLUSH IV SOLN 100 UNIT/ML 100 UNIT/ML
5-10 SOLUTION INTRAVENOUS
Status: DISCONTINUED | OUTPATIENT
Start: 2021-11-28 | End: 2021-11-28 | Stop reason: HOSPADM

## 2021-11-28 RX ORDER — SODIUM CHLORIDE, SODIUM LACTATE, POTASSIUM CHLORIDE, CALCIUM CHLORIDE 600; 310; 30; 20 MG/100ML; MG/100ML; MG/100ML; MG/100ML
125 INJECTION, SOLUTION INTRAVENOUS CONTINUOUS
Status: DISCONTINUED | OUTPATIENT
Start: 2021-11-28 | End: 2021-11-28 | Stop reason: HOSPADM

## 2021-11-28 RX ADMIN — SODIUM CHLORIDE, POTASSIUM CHLORIDE, SODIUM LACTATE AND CALCIUM CHLORIDE 1000 ML: 600; 310; 30; 20 INJECTION, SOLUTION INTRAVENOUS at 06:41

## 2021-11-28 RX ADMIN — SODIUM CHLORIDE, POTASSIUM CHLORIDE, SODIUM LACTATE AND CALCIUM CHLORIDE 125 ML/HR: 600; 310; 30; 20 INJECTION, SOLUTION INTRAVENOUS at 08:23

## 2021-11-28 RX ADMIN — Medication 5 ML: at 11:12

## 2021-11-28 RX ADMIN — MORPHINE SULFATE 2 MG: 2 INJECTION, SOLUTION INTRAMUSCULAR; INTRAVENOUS at 09:19

## 2021-11-28 RX ADMIN — MORPHINE SULFATE 2 MG: 2 INJECTION, SOLUTION INTRAMUSCULAR; INTRAVENOUS at 08:27

## 2021-11-28 RX ADMIN — OXYCODONE HYDROCHLORIDE 20 MG: 10 TABLET ORAL at 06:41

## 2021-11-28 ASSESSMENT — ENCOUNTER SYMPTOMS
COLOR CHANGE: 0
CHILLS: 0
DIARRHEA: 0
EYE PAIN: 0
CHEST TIGHTNESS: 0
NAUSEA: 0
DIZZINESS: 0
FATIGUE: 0
DYSURIA: 0
VOMITING: 0
CONFUSION: 0
ABDOMINAL DISTENTION: 0
BACK PAIN: 1
CONSTIPATION: 0
HEADACHES: 0
COUGH: 0
FREQUENCY: 0
ARTHRALGIAS: 0
PALPITATIONS: 0
SORE THROAT: 0
SHORTNESS OF BREATH: 0
NECK PAIN: 0
FEVER: 0
WEAKNESS: 0
MYALGIAS: 0
DIFFICULTY URINATING: 0
ABDOMINAL PAIN: 0

## 2021-11-28 ASSESSMENT — MIFFLIN-ST. JEOR: SCORE: 1516.11

## 2021-11-28 NOTE — DISCHARGE INSTRUCTIONS
Please make an appointment to follow up with Hematology Oncology Clinic (phone: [phone]) in 1-7 days.  Take it easy for the rest of today.  Please follow-up with your hematologist this coming week.  You may need to discuss changing your pain management protocol with your hematologist

## 2021-11-28 NOTE — ED NOTES
Sign out note: Jennifer Cervantes is a 21 year old female was signed out to me by Dr. Diaz at 0700 am.  Please see initial dictation for full details and history.  Patient has a history of sickle cell disease and cognitive delay with chronic hemiplegia who presents with a sickle cell pain crisis.  She is experiencing low back pain, which is typical of her sickle cell crisis pain.  Laboratory studies have been done and the patient has been treated according to her pain protocol.  Plan at time of sign out is check results of the laboratory studies,reevaluate the patient's pain and for disposition.       Course in the ED:  Results for orders placed or performed during the hospital encounter of 11/28/21   Comprehensive metabolic panel     Status: Abnormal   Result Value Ref Range    Sodium 138 133 - 144 mmol/L    Potassium 3.7 3.4 - 5.3 mmol/L    Chloride 110 (H) 94 - 109 mmol/L    Carbon Dioxide (CO2) 22 20 - 32 mmol/L    Anion Gap 6 3 - 14 mmol/L    Urea Nitrogen 10 7 - 30 mg/dL    Creatinine 0.59 0.52 - 1.04 mg/dL    Calcium 8.5 8.5 - 10.1 mg/dL    Glucose 103 (H) 70 - 99 mg/dL    Alkaline Phosphatase 68 40 - 150 U/L    AST 68 (H) 0 - 45 U/L    ALT 70 (H) 0 - 50 U/L    Protein Total 7.9 6.8 - 8.8 g/dL    Albumin 3.9 3.4 - 5.0 g/dL    Bilirubin Total 2.4 (H) 0.2 - 1.3 mg/dL    GFR Estimate >90 >60 mL/min/1.73m2   Reticulocyte count     Status: Abnormal   Result Value Ref Range    % Reticulocyte 15.6 (H) 0.5 - 2.0 %    Absolute Reticulocyte 0.384 (H) 0.025 - 0.095 10e6/uL   CBC with platelets and differential     Status: Abnormal   Result Value Ref Range    WBC Count 11.7 (H) 4.0 - 11.0 10e3/uL    RBC Count 2.49 (L) 3.80 - 5.20 10e6/uL    Hemoglobin 7.5 (L) 11.7 - 15.7 g/dL    Hematocrit 21.8 (L) 35.0 - 47.0 %    MCV 88 78 - 100 fL    MCH 30.1 26.5 - 33.0 pg    MCHC 34.4 31.5 - 36.5 g/dL    RDW 20.4 (H) 10.0 - 15.0 %    Platelet Count 498 (H) 150 - 450 10e3/uL    % Neutrophils 69 %    % Lymphocytes 17 %    % Monocytes 9 %     % Eosinophils 3 %    % Basophils 2 %    % Immature Granulocytes 0 %    NRBCs per 100 WBC 1 (H) <1 /100    Absolute Neutrophils 8.0 1.6 - 8.3 10e3/uL    Absolute Lymphocytes 2.0 0.8 - 5.3 10e3/uL    Absolute Monocytes 1.0 0.0 - 1.3 10e3/uL    Absolute Eosinophils 0.4 0.0 - 0.7 10e3/uL    Absolute Basophils 0.3 (H) 0.0 - 0.2 10e3/uL    Absolute Immature Granulocytes 0.1 (H) <=0.0 10e3/uL    Absolute NRBCs 0.1 10e3/uL   Extra Blue Top Tube     Status: None   Result Value Ref Range    Hold Specimen [facility]    CBC with platelets differential     Status: Abnormal    Narrative    The following orders were created for panel order CBC with platelets differential.  Procedure                               Abnormality         Status                     ---------                               -----------         ------                     CBC with platelets and d...[[id number]]  Abnormal            Final result                 Please view results for these tests on the individual orders.   [facility] [facility]     Status: None (In process)    Narrative    The following orders were created for panel order [facility] [facility].  Procedure                               Abnormality         Status                     ---------                               -----------         ------                     Extra Blue Top Tube[[id number]]                              Final result               Extra Red Top Tube[[id number]]                               In process                   Please view results for these tests on the individual orders.      Laboratory studies show slightly worsening anemia, with a hemoglobin of 7.5. ( 8.0 2 days ago.)  LFTs are elevated but improved from the 25th.  Reticulocyte count is elevated at 15.6%.  This is essentially the same as on 11/24 and improved from 11/10, when it was 21.8.    I reevaluated the patient at approximately 7:50 AM.  She states she is feeling no better.  She feels that oxycodone alone is not helping with her  pain.  She states she can take oral pain medications at home and they have not been working for her.  She did have an emergency care plan in the past that called for morphine IV 2 mg every hour x3.  As she is not feeling better, I will treat her with morphine IV.  She received 2 doses of morphine IV.  I reassessed her several times.  She was feeling better after IV morphine and feeling able to go home.    The patient is a 21-year-old female with a history of sickle cell disease who presents with back pain and sickle cell pain crisis.  She is feeling ready to be discharged at approximately 10:20 AM.  She will be discharged home with instructions to follow-up with the hematology clinic for reevaluation in the next day or so.  She may need to discuss changing her pain management protocol with her hematologist.    This note was created in part by the use of Dragon voice recognition dictation system. Inadvertent grammatical errors and typographical errors may still exist.  MD Ira Vences Alda L, MD  11/28/21 [id number]

## 2021-11-28 NOTE — ED PROVIDER NOTES
ED Provider Note  [facility]      History     Chief Complaint   Patient presents with     Sickle Cell Pain Crisis     HPI  Jennifer Cervantes is a 21 year old female who is well-known to the ED with history of sickle cell disease, cerebral infarction, cognitive development delay, chronic hemiplegia and hemiparesis from prior CVA, migraines, who presents to the ED with complaint of low back pain.  She states this is typical of her sickle cell pain.  Occurred approximately 1 AM tonight.  Unrelieved with her home OxyContin.  She denies any chest pain, shortness of breath, fever/chills, motor weakness, urinary issues, has no other medical complaints.  Symptoms have been constant onset.  No aggravating/alleviating factors.        Past Medical History  Past Medical History:   Diagnosis Date     Anxiety      Bleeding disorder (H)      Blood clotting disorder (H)      Cerebral infarction (H) 2015     Cognitive developmental delay     low IQ. 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 (H)     right hand contractures     Iron overload due to repeated red blood cell transfusions      Migraines      Multiple subsegmental pulmonary emboli without acute cor pulmonale (H) 02/01/2021     Oppositional defiant behavior      Superficial venous thrombosis of arm, right 03/25/2021     Uncomplicated asthma      Past Surgical History:   Procedure Laterality Date     AS INSERT TUNNELED CV 2 CATH W/O PORT/PUMP       C BREAST REDUCTION (INCLUDES LIPO) TIER 3 Bilateral 04/23/2019     CHOLECYSTECTOMY       CV RIGHT HEART CATH MEASUREMENTS RECORDED N/A 11/18/2021    Procedure: Right Heart Cath;  Surgeon: Jackson Stauffer MD;  Location:  HEART CARDIAC CATH LAB     INSERT PORT VASCULAR ACCESS Left 4/21/2021    Procedure: INSERTION, VASCULAR ACCESS PORT (NOT SURE ON SIDE UNTIL REMOVAL);   Surgeon: Rajan More MD;  Location: [facility] OR     IR CHEST PORT PLACEMENT > 5 YRS OF AGE  4/21/2021     IR CVC NON TUNNEL LINE REMOVAL  5/6/2021     IR CVC NON TUNNEL PLACEMENT  04/07/2020     IR CVC NON TUNNEL PLACEMENT  4/30/2021     IR PORT REMOVAL LEFT  4/21/2021     REMOVE PORT VASCULAR ACCESS Left 4/21/2021    Procedure: REMOVAL, VASCULAR ACCESS PORT LEFT;  Surgeon: Rajan More MD;  Location: [facility] OR     REPAIR TENDON ELBOW Right 10/02/2019    Procedure: Right Elbow Flexor Lengthening, Flexor Pronator Slide Of Wrist and Finger, Thumb Adductor Lengthening;  Surgeon: Anai Franco MD;  Location: [facility]     TONSILLECTOMY Bilateral 10/02/2019    Procedure: Bilateral Tonsillectomy;  Surgeon: Farhana Guy MD;  Location: [facility]     morphine (MS CONTIN) 15 MG CR tablet  oxyCODONE IR (ROXICODONE) 15 MG tablet  acetaminophen (TYLENOL) 325 MG tablet  albuterol (PROAIR HFA/PROVENTIL HFA/VENTOLIN HFA) 108 (90 Base) MCG/ACT inhaler  albuterol (PROVENTIL) (2.5 MG/3ML) 0.083% neb solution  ARIPiprazole (ABILIFY) 2 MG tablet  budesonide-formoterol (SYMBICORT) 160-4.5 MCG/ACT Inhaler  diphenhydrAMINE (BENADRYL) 25 MG capsule  DULoxetine (CYMBALTA) 30 MG capsule  EPINEPHrine (ANY BX GENERIC EQUIV) 0.3 MG/0.3ML injection 2-pack  Hydroxyurea 1000 MG TABS  hydrOXYzine (ATARAX) 25 MG tablet  JADENU 360 MG tablet  lidocaine-prilocaine (EMLA) 2.5-2.5 % external cream  medroxyPROGESTERone (DEPO-PROVERA) 150 MG/ML IM injection  naloxone (NARCAN) 4 MG/0.1ML nasal spray  omeprazole (PRILOSEC) 20 MG DR capsule  ondansetron (ZOFRAN) 8 MG tablet  warfarin ANTICOAGULANT (COUMADIN) 5 MG tablet  warfarin ANTICOAGULANT (COUMADIN) 7.5 MG tablet      Allergies   Allergen Reactions     Contrast Dye      Hives and breathing issues     Fish-Derived Products Hives     Seafood Hives     Diagnostic X-Ray Materials      Gadolinium      Family History  Family History   Problem Relation Age of Onset     Sickle Cell Trait Mother   "    Hypertension Mother      Asthma Mother      Sickle Cell Trait Father      Social History   Social History     Tobacco Use     Smoking status: Never Smoker     Smokeless tobacco: Never Used   Substance Use Topics     Alcohol use: Not Currently     Alcohol/week: 0.0 standard drinks     Drug use: Never      Past medical history, past surgical history, medications, allergies, family history, and social history were reviewed with the patient. No additional pertinent items.       Review of Systems   Constitutional: Negative for chills, fatigue and fever.   HENT: Negative for congestion and sore throat.    Eyes: Negative for pain and visual disturbance.   Respiratory: Negative for cough, chest tightness and shortness of breath.    Cardiovascular: Negative for chest pain and palpitations.   Gastrointestinal: Negative for abdominal distention, abdominal pain, constipation, diarrhea, nausea and vomiting.   Genitourinary: Negative for difficulty urinating, dysuria, frequency and urgency.   Musculoskeletal: Positive for back pain. Negative for arthralgias, myalgias and neck pain.   Skin: Negative for color change and rash.   Neurological: Negative for dizziness, weakness and headaches.   Psychiatric/Behavioral: Negative for confusion.     A complete review of systems was performed with pertinent positives and negatives noted in the HPI, and all other systems negative.    Physical Exam   BP: 134/86  Pulse: 111  Temp: 98.5  F (36.9  C)  Resp: 16  Height: 162.6 cm (5' 4\")  Weight: 77.1 kg (170 lb)  SpO2: 95 %  Physical Exam  Vitals and nursing note reviewed.   Constitutional:       General: She is not in acute distress.     Appearance: Normal appearance. She is not ill-appearing or toxic-appearing.   HENT:      Head: Normocephalic and atraumatic.      Nose: Nose normal.      Mouth/Throat:      Mouth: Mucous membranes are moist.   Eyes:      Pupils: Pupils are equal, round, and reactive to light.   Cardiovascular:      Rate and " Rhythm: Normal rate.      Pulses: Normal pulses.      Heart sounds: Normal heart sounds.   Pulmonary:      Effort: Pulmonary effort is normal. No respiratory distress.      Breath sounds: Normal breath sounds.   Abdominal:      General: Abdomen is flat. There is no distension.   Musculoskeletal:         General: No swelling or deformity. Normal range of motion.      Cervical back: Normal range of motion. No rigidity.      Comments: Bilateral paraspinal tenderness from L1-L5.  No midline tenderness.  No skin change.  No obvious deformities.   Skin:     General: Skin is warm.      Capillary Refill: Capillary refill takes less than 2 seconds.   Neurological:      Mental Status: She is alert and oriented to person, place, and time.   Psychiatric:         Mood and Affect: Mood normal.         ED Course             Results for orders placed or performed during the hospital encounter of 11/28/21   CBC with platelets differential     Status: None (In process)    Narrative    The following orders were created for panel order CBC with platelets differential.  Procedure                               Abnormality         Status                     ---------                               -----------         ------                     CBC with platelets and d...[[id number]]                      In process                   Please view results for these tests on the individual orders.   [place] Draw     Status: None (In process)    Narrative    The following orders were created for panel order [place] Draw.  Procedure                               Abnormality         Status                     ---------                               -----------         ------                     Extra Blue Top Tube[[id number]]                              In process                 Extra Red Top Tube[[id number]]                               In process                   Please view results for these tests on the individual orders.     Medications    lactated ringers BOLUS 1,000 mL (1,000 mLs Intravenous New Bag 11/28/21 0641)     Followed by   lactated ringers infusion (has no administration in time range)   ketorolac (TORADOL) injection 15 mg (has no administration in time range)   ondansetron (ZOFRAN) injection 8 mg (has no administration in time range)   oxyCODONE IR (ROXICODONE) tablet 20 mg (20 mg Oral Given 11/28/21 0641)        Assessments & Plan (with Medical Decision Making)   Patient resents to the ED for evaluation of low back pain.  History of sickle cell disease.    Differential diagnosis includes sickle cell crisis/vaso-occlusive crisis, exacerbation of chronic pain, lumbosacral strain.  No incontinence, motor weakness, numbness/tingling to suggest cauda equina/conus medullaris syndrome.  No fever/chills or midline tenderness to suggest discitis/epidural abscess.  No history to suggest acute chest syndrome.    Plan for basic labs CBC, CMP, reticulocyte count, hCG.    We will give pain medications according to patient's pain protocol including 20 mg oxycodone, 15 mg IV ketorolac, ondansetron as needed, IV lactated Ringer's.    We will sign out to morning provider with plan to follow-up on lab results, reassess patient, and disposition accordingly.    I have reviewed the nursing notes. I have reviewed the findings, diagnosis, plan and need for follow up with the patient.    New Prescriptions    No medications on file       Final diagnoses:   None       --  Raul Diaz DO  [facility] EMERGENCY DEPARTMENT  11/28/2021     Raul Diaz DO  11/29/21 0036

## 2021-11-29 ENCOUNTER — DOCUMENTATION ONLY (OUTPATIENT)
Dept: ANTICOAGULATION | Facility: CLINIC | Age: 21
End: 2021-11-29

## 2021-11-29 ENCOUNTER — ANTICOAGULATION THERAPY VISIT (OUTPATIENT)
Dept: ANTICOAGULATION | Facility: CLINIC | Age: 21
End: 2021-11-29

## 2021-11-29 ENCOUNTER — HOSPITAL ENCOUNTER (EMERGENCY)
Facility: CLINIC | Age: 21
Discharge: HOME | End: 2021-11-29
Attending: EMERGENCY MEDICINE | Admitting: EMERGENCY MEDICINE
Payer: COMMERCIAL

## 2021-11-29 VITALS
SYSTOLIC BLOOD PRESSURE: 115 MMHG | DIASTOLIC BLOOD PRESSURE: 60 MMHG | OXYGEN SATURATION: 92 % | HEART RATE: 104 BPM | RESPIRATION RATE: 16 BRPM | TEMPERATURE: 98.1 F

## 2021-11-29 DIAGNOSIS — D57.00 SICKLE CELL PAIN CRISIS (H): ICD-10-CM

## 2021-11-29 DIAGNOSIS — I27.82 CHRONIC PULMONARY EMBOLISM WITHOUT ACUTE COR PULMONALE, UNSPECIFIED PULMONARY EMBOLISM TYPE (H): Primary | ICD-10-CM

## 2021-11-29 LAB
ALBUMIN SERPL-MCNC: 4 G/DL (ref 3.4–5)
ALP SERPL-CCNC: 72 U/L (ref 40–150)
ALT SERPL W P-5'-P-CCNC: 70 U/L (ref 0–50)
ANION GAP SERPL CALCULATED.3IONS-SCNC: 6 MMOL/L (ref 3–14)
AST SERPL W P-5'-P-CCNC: 74 U/L (ref 0–45)
BASOPHILS # BLD AUTO: 0.2 10E3/UL (ref 0–0.2)
BASOPHILS NFR BLD AUTO: 2 %
BILIRUB SERPL-MCNC: 2.7 MG/DL (ref 0.2–1.3)
BUN SERPL-MCNC: 9 MG/DL (ref 7–30)
CALCIUM SERPL-MCNC: 9.2 MG/DL (ref 8.5–10.1)
CHLORIDE BLD-SCNC: 108 MMOL/L (ref 94–109)
CO2 SERPL-SCNC: 20 MMOL/L (ref 20–32)
CREAT SERPL-MCNC: 0.53 MG/DL (ref 0.52–1.04)
EOSINOPHIL # BLD AUTO: 0.5 10E3/UL (ref 0–0.7)
EOSINOPHIL NFR BLD AUTO: 3 %
ERYTHROCYTE [DISTWIDTH] IN BLOOD BY AUTOMATED COUNT: 20.7 % (ref 10–15)
GFR SERPL CREATININE-BSD FRML MDRD: >90 ML/MIN/1.73M2
GLUCOSE BLD-MCNC: 96 MG/DL (ref 70–99)
HCT VFR BLD AUTO: 21.9 % (ref 35–47)
HGB BLD-MCNC: 7.8 G/DL (ref 11.7–15.7)
IMM GRANULOCYTES # BLD: 0.1 10E3/UL
IMM GRANULOCYTES NFR BLD: 1 %
INR PPP: 1.75 (ref 0.85–1.15)
LYMPHOCYTES # BLD AUTO: 2.3 10E3/UL (ref 0.8–5.3)
LYMPHOCYTES NFR BLD AUTO: 14 %
MCH RBC QN AUTO: 30.2 PG (ref 26.5–33)
MCHC RBC AUTO-ENTMCNC: 35.6 G/DL (ref 31.5–36.5)
MCV RBC AUTO: 85 FL (ref 78–100)
MONOCYTES # BLD AUTO: 1.6 10E3/UL (ref 0–1.3)
MONOCYTES NFR BLD AUTO: 10 %
NEUTROPHILS # BLD AUTO: 11.3 10E3/UL (ref 1.6–8.3)
NEUTROPHILS NFR BLD AUTO: 70 %
NRBC # BLD AUTO: 0.2 10E3/UL
NRBC BLD AUTO-RTO: 1 /100
PLATELET # BLD AUTO: 497 10E3/UL (ref 150–450)
POTASSIUM BLD-SCNC: 3.7 MMOL/L (ref 3.4–5.3)
PROT SERPL-MCNC: 8.3 G/DL (ref 6.8–8.8)
RBC # BLD AUTO: 2.58 10E6/UL (ref 3.8–5.2)
RETICS # AUTO: 0.43 10E6/UL (ref 0.03–0.1)
RETICS/RBC NFR AUTO: 16.6 % (ref 0.5–2)
SODIUM SERPL-SCNC: 134 MMOL/L (ref 133–144)
WBC # BLD AUTO: 16 10E3/UL (ref 4–11)

## 2021-11-29 PROCEDURE — 96376 TX/PRO/DX INJ SAME DRUG ADON: CPT | Performed by: EMERGENCY MEDICINE

## 2021-11-29 PROCEDURE — 258N000003 HC RX IP 258 OP 636: Performed by: EMERGENCY MEDICINE

## 2021-11-29 PROCEDURE — 99285 EMERGENCY DEPT VISIT HI MDM: CPT | Performed by: EMERGENCY MEDICINE

## 2021-11-29 PROCEDURE — 36415 COLL VENOUS BLD VENIPUNCTURE: CPT | Performed by: EMERGENCY MEDICINE

## 2021-11-29 PROCEDURE — 85045 AUTOMATED RETICULOCYTE COUNT: CPT | Performed by: EMERGENCY MEDICINE

## 2021-11-29 PROCEDURE — 96361 HYDRATE IV INFUSION ADD-ON: CPT | Performed by: EMERGENCY MEDICINE

## 2021-11-29 PROCEDURE — 96375 TX/PRO/DX INJ NEW DRUG ADDON: CPT | Performed by: EMERGENCY MEDICINE

## 2021-11-29 PROCEDURE — 99285 EMERGENCY DEPT VISIT HI MDM: CPT | Mod: 25 | Performed by: EMERGENCY MEDICINE

## 2021-11-29 PROCEDURE — 85025 COMPLETE CBC W/AUTO DIFF WBC: CPT | Performed by: EMERGENCY MEDICINE

## 2021-11-29 PROCEDURE — 85610 PROTHROMBIN TIME: CPT | Performed by: EMERGENCY MEDICINE

## 2021-11-29 PROCEDURE — 80053 COMPREHEN METABOLIC PANEL: CPT | Performed by: EMERGENCY MEDICINE

## 2021-11-29 PROCEDURE — 96374 THER/PROPH/DIAG INJ IV PUSH: CPT | Performed by: EMERGENCY MEDICINE

## 2021-11-29 PROCEDURE — 250N000011 HC RX IP 250 OP 636: Performed by: EMERGENCY MEDICINE

## 2021-11-29 RX ORDER — SODIUM CHLORIDE, SODIUM LACTATE, POTASSIUM CHLORIDE, CALCIUM CHLORIDE 600; 310; 30; 20 MG/100ML; MG/100ML; MG/100ML; MG/100ML
INJECTION, SOLUTION INTRAVENOUS CONTINUOUS
Status: DISCONTINUED | OUTPATIENT
Start: 2021-11-29 | End: 2021-11-29 | Stop reason: HOSPADM

## 2021-11-29 RX ORDER — MORPHINE SULFATE 2 MG/ML
2 INJECTION, SOLUTION INTRAMUSCULAR; INTRAVENOUS
Status: COMPLETED | OUTPATIENT
Start: 2021-11-29 | End: 2021-11-29

## 2021-11-29 RX ORDER — ONDANSETRON 2 MG/ML
4 INJECTION INTRAMUSCULAR; INTRAVENOUS ONCE
Status: COMPLETED | OUTPATIENT
Start: 2021-11-29 | End: 2021-11-29

## 2021-11-29 RX ORDER — HEPARIN SODIUM (PORCINE) LOCK FLUSH IV SOLN 100 UNIT/ML 100 UNIT/ML
5-10 SOLUTION INTRAVENOUS
Status: DISCONTINUED | OUTPATIENT
Start: 2021-11-29 | End: 2021-11-29 | Stop reason: HOSPADM

## 2021-11-29 RX ADMIN — MORPHINE SULFATE 2 MG: 2 INJECTION, SOLUTION INTRAMUSCULAR; INTRAVENOUS at 08:17

## 2021-11-29 RX ADMIN — SODIUM CHLORIDE, POTASSIUM CHLORIDE, SODIUM LACTATE AND CALCIUM CHLORIDE: 600; 310; 30; 20 INJECTION, SOLUTION INTRAVENOUS at 06:34

## 2021-11-29 RX ADMIN — Medication 5 ML: at 10:48

## 2021-11-29 RX ADMIN — ONDANSETRON 4 MG: 2 INJECTION INTRAMUSCULAR; INTRAVENOUS at 08:44

## 2021-11-29 RX ADMIN — MORPHINE SULFATE 2 MG: 2 INJECTION, SOLUTION INTRAMUSCULAR; INTRAVENOUS at 09:40

## 2021-11-29 RX ADMIN — MORPHINE SULFATE 2 MG: 2 INJECTION, SOLUTION INTRAMUSCULAR; INTRAVENOUS at 06:33

## 2021-11-29 ASSESSMENT — MIFFLIN-ST. JEOR: SCORE: 1516.11

## 2021-11-29 ASSESSMENT — ENCOUNTER SYMPTOMS
WEAKNESS: 0
BACK PAIN: 1
SHORTNESS OF BREATH: 0
FEVER: 0
DYSURIA: 0
BRUISES/BLEEDS EASILY: 1
CONFUSION: 0
ABDOMINAL PAIN: 0

## 2021-11-29 NOTE — DISCHARGE INSTRUCTIONS
Please make an appointment to follow up with [facility] (phone: [phone]) in 1-2 days as needed.    Return to the Emergency Department if you develop worsening pain, shortness of breath, fever, coughing up blood, or fainting.

## 2021-11-29 NOTE — ED TRIAGE NOTES
Patient arrives to ED c/o sickle cell pain crisis. Patient reports pain is in her low back. PTA patient took her prescribed oxycodone 15mg and morphine 10mg without relief.

## 2021-11-29 NOTE — ED NOTES
Emergency Department Patient Sign-out       Brief HPI:  This is a 21 year old female signed out to me by Dr. Martin .  See initial ED Provider note for details of the presentation.            Significant Events prior to my assuming care: Sickle cell pain. Old pain plan ordered with IV morphine. Sxs typical. No infectious sxs.       Exam:   Patient Vitals for the past 24 hrs:   BP Temp Temp src Pulse Resp SpO2   11/29/21 1035 115/60 98.1  F (36.7  C) Oral -- -- 92 %   11/29/21 0939 118/68 -- -- 104 16 93 %   11/29/21 0816 127/75 -- -- 107 16 96 %   11/29/21 0506 (!) 168/85 98.7  F (37.1  C) Oral 120 18 92 %           ED RESULTS:   Results for orders placed or performed during the hospital encounter of 11/29/21 (from the past 24 hour(s))   CBC with platelets differential     Status: Abnormal    Collection Time: 11/29/21  6:52 AM    Narrative    The following orders were created for panel order CBC with platelets differential.  Procedure                               Abnormality         Status                     ---------                               -----------         ------                     CBC with platelets and d...[[id number]]  Abnormal            Final result                 Please view results for these tests on the individual orders.   Comprehensive metabolic panel     Status: Abnormal    Collection Time: 11/29/21  6:52 AM   Result Value Ref Range    Sodium 134 133 - 144 mmol/L    Potassium 3.7 3.4 - 5.3 mmol/L    Chloride 108 94 - 109 mmol/L    Carbon Dioxide (CO2) 20 20 - 32 mmol/L    Anion Gap 6 3 - 14 mmol/L    Urea Nitrogen 9 7 - 30 mg/dL    Creatinine 0.53 0.52 - 1.04 mg/dL    Calcium 9.2 8.5 - 10.1 mg/dL    Glucose 96 70 - 99 mg/dL    Alkaline Phosphatase 72 40 - 150 U/L    AST 74 (H) 0 - 45 U/L    ALT 70 (H) 0 - 50 U/L    Protein Total 8.3 6.8 - 8.8 g/dL    Albumin 4.0 3.4 - 5.0 g/dL    Bilirubin Total 2.7 (H) 0.2 - 1.3 mg/dL    GFR Estimate >90 >60 mL/min/1.73m2   INR     Status: Abnormal     Collection Time: 11/29/21  6:52 AM   Result Value Ref Range    INR 1.75 (H) 0.85 - 1.15   Reticulocyte count     Status: Abnormal    Collection Time: 11/29/21  6:52 AM   Result Value Ref Range    % Reticulocyte 16.6 (H) 0.5 - 2.0 %    Absolute Reticulocyte 0.428 (H) 0.025 - 0.095 10e6/uL   CBC with platelets and differential     Status: Abnormal    Collection Time: 11/29/21  6:52 AM   Result Value Ref Range    WBC Count 16.0 (H) 4.0 - 11.0 10e3/uL    RBC Count 2.58 (L) 3.80 - 5.20 10e6/uL    Hemoglobin 7.8 (L) 11.7 - 15.7 g/dL    Hematocrit 21.9 (L) 35.0 - 47.0 %    MCV 85 78 - 100 fL    MCH 30.2 26.5 - 33.0 pg    MCHC 35.6 31.5 - 36.5 g/dL    RDW 20.7 (H) 10.0 - 15.0 %    Platelet Count 497 (H) 150 - 450 10e3/uL    % Neutrophils 70 %    % Lymphocytes 14 %    % Monocytes 10 %    % Eosinophils 3 %    % Basophils 2 %    % Immature Granulocytes 1 %    NRBCs per 100 WBC 1 (H) <1 /100    Absolute Neutrophils 11.3 (H) 1.6 - 8.3 10e3/uL    Absolute Lymphocytes 2.3 0.8 - 5.3 10e3/uL    Absolute Monocytes 1.6 (H) 0.0 - 1.3 10e3/uL    Absolute Eosinophils 0.5 0.0 - 0.7 10e3/uL    Absolute Basophils 0.2 0.0 - 0.2 10e3/uL    Absolute Immature Granulocytes 0.1 (H) <=0.0 10e3/uL    Absolute NRBCs 0.2 10e3/uL       ED MEDICATIONS:   Medications   morphine (PF) injection 2 mg (2 mg Intravenous Given 11/29/21 0940)   ondansetron (ZOFRAN) injection 4 mg (4 mg Intravenous Given 11/29/21 0844)         Impression:    ICD-10-CM    1. Sickle cell pain crisis (H)  D57.00        Plan:    Pending studies include reassess after third dose of morphine.      Patient ready for discharge after third dose. Recommended follow up with hematologist. Return precautions provided.         MD Chito Nunez Jill C, MD  11/29/21 4453

## 2021-11-29 NOTE — PROGRESS NOTES
ANTICOAGULATION  MANAGEMENT: Discharge Review    Jennifer Cervantes chart reviewed for anticoagulation continuity of care    Emergency room visit on 11/28 for Sickle Cell Pain Crisis.    Discharge disposition: Home    Results:    Recent labs: (last 7 days)     11/23/21  1201 11/25/21  1806 11/26/21  0925 11/29/21  0652   INR 1.48* 1.28* 1.30* 1.75*     Anticoagulation inpatient management:     not applicable     Anticoagulation discharge instructions:     Warfarin dosing: Not applicable    Bridging: No   INR goal change: No      Medication changes affecting anticoagulation: Yes: Ketorolac 15mg via IV was given in the ER and per the literature this can increase the risk for bleeding.    Additional factors affecting anticoagulation: Yes: Pt has been having more sickle cell crisis within the last week. Pt is in the ER right now for this issue.    Pt also got Oxycodone 20mg IV, Ordansetron PRN, and Lactated Ringer IV.    Plan     No adjustment to anticoagulation plan needed    Patient not contacted    No adjustment to Anticoagulation Calendar was required    Magy Roper RN

## 2021-11-29 NOTE — ED PROVIDER NOTES
ED Provider Note  [facility]      History     Chief Complaint   Patient presents with     Sickle Cell Pain Crisis     HPI  Jennifer Cervantes is a 21 year old female who is well-known to the ED with history of sickle cell disease, cerebral infarction, cognitive development delay, chronic hemiplegia and hemiparesis from prior CVA, migraines, who presents to the ED with complaint of low back pain.  Patient reports pain in her low back that is described as a constant sharp throbbing pain with no radiation, no aggravating, no alleviating factors.  Patient states this is typical of her sickle cell pain and states that the weather tends to make her symptoms worse.  Patient reports that she was in the ER yesterday for similar symptoms however feeling better, went home and felt okay last night.  Patient states that she woke up this morning around 4 AM with worsening pain.  Patient states that she took her muscle relaxant, oral morphine, and oral oxycodone with no improvement or symptoms so came into the emergency department for further evaluation.  Patient denies any fever, chills, nausea, vomiting, chest pain, shortness of breath, cough, weakness, tingling, numbness, abdominal pain, no urinary symptoms, no urinary retention, and urinary/bowel incontinence..  No other complaints.    Past Medical History  Past Medical History:   Diagnosis Date     Anxiety      Bleeding disorder (H)      Blood clotting disorder (H)      Cerebral infarction (H) 2015     Cognitive developmental delay     low IQ. 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 (H)     right hand contractures     Iron overload due to repeated red blood cell transfusions      Migraines      Multiple subsegmental pulmonary emboli without acute cor pulmonale (H) 02/01/2021     Oppositional defiant behavior       Superficial venous thrombosis of arm, right 03/25/2021     Uncomplicated asthma      Past Surgical History:   Procedure Laterality Date     AS INSERT TUNNELED CV 2 CATH W/O PORT/PUMP       C BREAST REDUCTION (INCLUDES LIPO) TIER 3 Bilateral 04/23/2019     CHOLECYSTECTOMY       CV RIGHT HEART CATH MEASUREMENTS RECORDED N/A 11/18/2021    Procedure: Right Heart Cath;  Surgeon: Jackson Stauffer MD;  Location:  HEART CARDIAC CATH LAB     INSERT PORT VASCULAR ACCESS Left 4/21/2021    Procedure: INSERTION, VASCULAR ACCESS PORT (NOT SURE ON SIDE UNTIL REMOVAL);  Surgeon: Rajan More MD;  Location: [facility] OR     IR CHEST PORT PLACEMENT > 5 YRS OF AGE  4/21/2021     IR CVC NON TUNNEL LINE REMOVAL  5/6/2021     IR CVC NON TUNNEL PLACEMENT  04/07/2020     IR CVC NON TUNNEL PLACEMENT  4/30/2021     IR PORT REMOVAL LEFT  4/21/2021     REMOVE PORT VASCULAR ACCESS Left 4/21/2021    Procedure: REMOVAL, VASCULAR ACCESS PORT LEFT;  Surgeon: Rajan More MD;  Location: [facility] OR     REPAIR TENDON ELBOW Right 10/02/2019    Procedure: Right Elbow Flexor Lengthening, Flexor Pronator Slide Of Wrist and Finger, Thumb Adductor Lengthening;  Surgeon: Anai Franco MD;  [facility]     TONSILLECTOMY Bilateral 10/02/2019    Procedure: Bilateral Tonsillectomy;  Surgeon: Farhana Guy MD;  Location: [facility]     acetaminophen (TYLENOL) 325 MG tablet  albuterol (PROAIR HFA/PROVENTIL HFA/VENTOLIN HFA) 108 (90 Base) MCG/ACT inhaler  albuterol (PROVENTIL) (2.5 MG/3ML) 0.083% neb solution  ARIPiprazole (ABILIFY) 2 MG tablet  budesonide-formoterol (SYMBICORT) 160-4.5 MCG/ACT Inhaler  diphenhydrAMINE (BENADRYL) 25 MG capsule  DULoxetine (CYMBALTA) 30 MG capsule  EPINEPHrine (ANY BX GENERIC EQUIV) 0.3 MG/0.3ML injection 2-pack  Hydroxyurea 1000 MG TABS  hydrOXYzine (ATARAX) 25 MG tablet  JADENU 360 MG tablet  lidocaine-prilocaine (EMLA) 2.5-2.5 % external cream  medroxyPROGESTERone (DEPO-PROVERA) 150 MG/ML IM  injection  morphine (MS CONTIN) 15 MG CR tablet  naloxone (NARCAN) 4 MG/0.1ML nasal spray  omeprazole (PRILOSEC) 20 MG DR capsule  ondansetron (ZOFRAN) 8 MG tablet  oxyCODONE IR (ROXICODONE) 15 MG tablet  warfarin ANTICOAGULANT (COUMADIN) 5 MG tablet  warfarin ANTICOAGULANT (COUMADIN) 7.5 MG tablet      Allergies   Allergen Reactions     Contrast Dye      Hives and breathing issues     Fish-Derived Products Hives     Seafood Hives     Diagnostic X-Ray Materials      Gadolinium      Family History  Family History   Problem Relation Age of Onset     Sickle Cell Trait Mother      Hypertension Mother      Asthma Mother      Sickle Cell Trait Father      Social History   Social History     Tobacco Use     Smoking status: Never Smoker     Smokeless tobacco: Never Used   Substance Use Topics     Alcohol use: Not Currently     Alcohol/week: 0.0 standard drinks     Drug use: Never      Past medical history, past surgical history, medications, allergies, family history, and social history were reviewed with the patient. No additional pertinent items.       Review of Systems   Constitutional: Negative for fever.   HENT: Negative for congestion.    Eyes: Negative for visual disturbance.   Respiratory: Negative for shortness of breath.    Cardiovascular: Negative for chest pain.   Gastrointestinal: Negative for abdominal pain.   Endocrine: Negative for polyuria.   Genitourinary: Negative for dysuria.   Musculoskeletal: Positive for back pain.   Skin: Negative for rash.   Allergic/Immunologic: Positive for immunocompromised state.   Neurological: Negative for weakness.   Hematological: Bruises/bleeds easily.   Psychiatric/Behavioral: Negative for confusion.     A complete review of systems was performed with pertinent positives and negatives noted in the HPI, and all other systems negative.    Physical Exam   BP: (!) 168/85  Pulse: 120  Temp: 98.7  F (37.1  C)  Resp: 18  SpO2: 92 %  Physical Exam  General: Afebrile, no acute  distress   HEENT: Normocephalic, atraumatic, conjunctivae normal. MMM  Neck: non-tender, supple  Cardio: regular rate. regular rhythm   Resp: Normal work of breathing, no respiratory distress, lungs clear bilaterally, no wheezing, rhonchi, rales  Chest/Back: no visual signs of trauma, no CVA tenderness, no midline TTP, +diffuse low back tenderness   Abdomen: soft, non distension, no tenderness, no peritoneal signs   Neuro: alert and fully oriented. CN II-XII grossly intact. Grossly normal strength and sensation in all extremities.   MSK: no deformities. Normal range of motion  Integumentary/Skin: no rash visualized, normal color  Psych: normal affect, normal behavior    ED Course      Procedures       No results found for any visits on 11/29/21.  Medications   lactated ringers infusion (has no administration in time range)   morphine (PF) injection 2 mg (has no administration in time range)        Assessments & Plan (with Medical Decision Making)   Jennifer Cervantes is a 21 year old female who is well-known to the ED with history of sickle cell disease, cerebral infarction, cognitive development delay, chronic hemiplegia and hemiparesis from prior CVA, migraines, who presents to the ED with complaint of low back pain.  Upon arrival patient is well-appearing, afebrile, mild distress secondary to pain.  Patient here with low back pain typical of her sickle cell pain crises, no red flags, no fevers, cough, shortness of breath, chest pain, weakness, paresthesias, urinary/bowel incontinence or retention.  Patient seen in the emergency department on 11/25, 11/28 for similar symptoms.  At this time will plan for comprehensive labs, patient recently had a change in her care plan for oral oxycodone however has been in the emergency department twice with no improvement of her symptoms so will treat her with 2 mg IV morphine x3 doses which was her prior care plan along with maintenance IV fluids, will continue close monitoring.   Disposition pending reevaluation after pain medications.    Patient signed out to morning provider pending reevaluation and disposition.    I have reviewed the nursing notes. I have reviewed the findings, diagnosis, plan and need for follow up with the patient.    New Prescriptions    No medications on file       Final diagnoses:   Sickle cell pain crisis (H)       --  Jamee Martin MD  [facility] EMERGENCY DEPARTMENT  11/29/2021     Jamee Martin MD  11/29/21 0618

## 2021-11-29 NOTE — PROGRESS NOTES
ANTICOAGULATION MANAGEMENT     Jennifer Cervantes 21 year old female is on warfarin with subtherapeutic INR result. (Goal INR 2.0-3.0)    Recent labs: (last 7 days)     11/29/21  0652   INR 1.75*       ASSESSMENT     Source(s): Chart Review and Patient/Caregiver Call       Warfarin doses taken: Warfarin taken as instructed    Diet: No new diet changes identified    New illness, injury, or hospitalization: Yes: 3 ED visits in the last 4 days for sickle cell pain crisis    Medication/supplement changes: no new prescribed medications, received Ondansetron, LR infusion, and morphine in ED-ED visit 11/29/21    Signs or symptoms of bleeding or clotting: No    Previous INR: Subtherapeutic    Additional findings: None     PLAN     Recommended plan for ongoing change(s) affecting INR     Dosing Instructions:  Increase your warfarin dose (9.5% change) with next INR in 3 days       Summary  As of 11/29/2021    Full warfarin instructions:  10 mg every Mon, Thu; 7.5 mg all other days   Next INR check:  12/2/2021             Telephone call with Jennifer who verbalizes understanding and agrees to plan and who agrees to plan and repeated back plan correctly    Lab visit scheduled    Education provided: Goal range and significance of current result, Importance of notifying clinic for changes in medications; a sooner lab recheck maybe needed., Importance of notifying clinic for diarrhea, nausea/vomiting, reduced intake, and/or illness; a sooner lab recheck maybe needed., Importance of notifying clinic of upcoming surgeries and procedures 2 weeks in advance and Contact [phone] with any changes, questions or concerns.     Plan made per [facility] anticoagulation protocol    PRINCESS MORENO RN  Anticoagulation Clinic  11/29/2021    _______________________________________________________________________     Anticoagulation Episode Summary     Current INR goal:  2.0-3.0   TTR:  --   Target end date:  Indefinite   Send IN[facility] reminders to:  [facility]    Indications    Chronic pulmonary embolism without acute cor pulmonale  unspecified pulmonary embolism type (H) [I27.82]           Comments:  Infusion center [phone]         Anticoagulation Care Providers     Provider Role Specialty Phone number    Eric Duncan MD Referring Pediatric Hematology-Oncology [phone]

## 2021-11-30 ENCOUNTER — HOSPITAL ENCOUNTER (EMERGENCY)
Facility: CLINIC | Age: 21
Discharge: HOME | End: 2021-11-30
Attending: EMERGENCY MEDICINE
Payer: COMMERCIAL

## 2021-11-30 ENCOUNTER — VIRTUAL VISIT (OUTPATIENT)
Dept: ONCOLOGY | Facility: CLINIC | Age: 21
End: 2021-11-30
Attending: PHYSICIAN ASSISTANT
Payer: COMMERCIAL

## 2021-11-30 ENCOUNTER — DOCUMENTATION ONLY (OUTPATIENT)
Dept: ANTICOAGULATION | Facility: CLINIC | Age: 21
End: 2021-11-30

## 2021-11-30 VITALS
HEART RATE: 84 BPM | SYSTOLIC BLOOD PRESSURE: 102 MMHG | WEIGHT: 170 LBS | OXYGEN SATURATION: 98 % | BODY MASS INDEX: 29.02 KG/M2 | DIASTOLIC BLOOD PRESSURE: 68 MMHG | HEIGHT: 64 IN | TEMPERATURE: 98.2 F | RESPIRATION RATE: 18 BRPM

## 2021-11-30 DIAGNOSIS — Z95.828 PORT-A-CATH IN PLACE: ICD-10-CM

## 2021-11-30 DIAGNOSIS — D57.00 SICKLE CELL DISEASE WITH CRISIS (H): ICD-10-CM

## 2021-11-30 DIAGNOSIS — D57.00 SICKLE CELL PAIN CRISIS (H): Primary | ICD-10-CM

## 2021-11-30 DIAGNOSIS — D57.1 HB-SS DISEASE WITHOUT CRISIS (H): ICD-10-CM

## 2021-11-30 DIAGNOSIS — E83.111 IRON OVERLOAD DUE TO REPEATED RED BLOOD CELL TRANSFUSIONS: ICD-10-CM

## 2021-11-30 DIAGNOSIS — F41.9 ANXIETY: ICD-10-CM

## 2021-11-30 LAB
ANION GAP SERPL CALCULATED.3IONS-SCNC: 7 MMOL/L (ref 3–14)
BASOPHILS # BLD AUTO: 0.2 10E3/UL (ref 0–0.2)
BASOPHILS NFR BLD AUTO: 1 %
BUN SERPL-MCNC: 7 MG/DL (ref 7–30)
CALCIUM SERPL-MCNC: 8.8 MG/DL (ref 8.5–10.1)
CHLORIDE BLD-SCNC: 108 MMOL/L (ref 94–109)
CO2 SERPL-SCNC: 20 MMOL/L (ref 20–32)
CREAT SERPL-MCNC: 0.56 MG/DL (ref 0.52–1.04)
EOSINOPHIL # BLD AUTO: 0.8 10E3/UL (ref 0–0.7)
EOSINOPHIL NFR BLD AUTO: 5 %
ERYTHROCYTE [DISTWIDTH] IN BLOOD BY AUTOMATED COUNT: 21.6 % (ref 10–15)
GFR SERPL CREATININE-BSD FRML MDRD: >90 ML/MIN/1.73M2
GLUCOSE BLD-MCNC: 100 MG/DL (ref 70–99)
HCT VFR BLD AUTO: 21.4 % (ref 35–47)
HGB BLD-MCNC: 7.7 G/DL (ref 11.7–15.7)
HOLD SPECIMEN: NORMAL
IMM GRANULOCYTES # BLD: 0.1 10E3/UL
IMM GRANULOCYTES NFR BLD: 1 %
LYMPHOCYTES # BLD AUTO: 1.8 10E3/UL (ref 0.8–5.3)
LYMPHOCYTES NFR BLD AUTO: 12 %
MCH RBC QN AUTO: 29.6 PG (ref 26.5–33)
MCHC RBC AUTO-ENTMCNC: 36 G/DL (ref 31.5–36.5)
MCV RBC AUTO: 82 FL (ref 78–100)
MONOCYTES # BLD AUTO: 1.3 10E3/UL (ref 0–1.3)
MONOCYTES NFR BLD AUTO: 8 %
NEUTROPHILS # BLD AUTO: 11.3 10E3/UL (ref 1.6–8.3)
NEUTROPHILS NFR BLD AUTO: 73 %
NRBC # BLD AUTO: 0.3 10E3/UL
NRBC BLD AUTO-RTO: 2 /100
PLATELET # BLD AUTO: 513 10E3/UL (ref 150–450)
POTASSIUM BLD-SCNC: 3.2 MMOL/L (ref 3.4–5.3)
RBC # BLD AUTO: 2.6 10E6/UL (ref 3.8–5.2)
RETICS # AUTO: 0.43 10E6/UL (ref 0.03–0.1)
RETICS/RBC NFR AUTO: 18 % (ref 0.5–2)
SODIUM SERPL-SCNC: 135 MMOL/L (ref 133–144)
WBC # BLD AUTO: 15.5 10E3/UL (ref 4–11)

## 2021-11-30 PROCEDURE — 99214 OFFICE O/P EST MOD 30 MIN: CPT | Mod: GT | Performed by: PHYSICIAN ASSISTANT

## 2021-11-30 PROCEDURE — 85025 COMPLETE CBC W/AUTO DIFF WBC: CPT | Performed by: EMERGENCY MEDICINE

## 2021-11-30 PROCEDURE — 250N000013 HC RX MED GY IP 250 OP 250 PS 637: Performed by: EMERGENCY MEDICINE

## 2021-11-30 PROCEDURE — 36415 COLL VENOUS BLD VENIPUNCTURE: CPT | Performed by: EMERGENCY MEDICINE

## 2021-11-30 PROCEDURE — 96374 THER/PROPH/DIAG INJ IV PUSH: CPT | Performed by: EMERGENCY MEDICINE

## 2021-11-30 PROCEDURE — 85045 AUTOMATED RETICULOCYTE COUNT: CPT | Performed by: EMERGENCY MEDICINE

## 2021-11-30 PROCEDURE — 96361 HYDRATE IV INFUSION ADD-ON: CPT | Performed by: EMERGENCY MEDICINE

## 2021-11-30 PROCEDURE — 258N000003 HC RX IP 258 OP 636: Performed by: EMERGENCY MEDICINE

## 2021-11-30 PROCEDURE — 96375 TX/PRO/DX INJ NEW DRUG ADDON: CPT | Performed by: EMERGENCY MEDICINE

## 2021-11-30 PROCEDURE — 96376 TX/PRO/DX INJ SAME DRUG ADON: CPT | Performed by: EMERGENCY MEDICINE

## 2021-11-30 PROCEDURE — 250N000011 HC RX IP 250 OP 636: Performed by: EMERGENCY MEDICINE

## 2021-11-30 PROCEDURE — 80048 BASIC METABOLIC PNL TOTAL CA: CPT | Performed by: EMERGENCY MEDICINE

## 2021-11-30 RX ORDER — HYDROMORPHONE HYDROCHLORIDE 1 MG/ML
0.5 INJECTION, SOLUTION INTRAMUSCULAR; INTRAVENOUS; SUBCUTANEOUS
Status: COMPLETED | OUTPATIENT
Start: 2021-11-30 | End: 2021-11-30

## 2021-11-30 RX ORDER — OXYCODONE HYDROCHLORIDE 10 MG/1
20 TABLET ORAL ONCE
Status: COMPLETED | OUTPATIENT
Start: 2021-11-30 | End: 2021-11-30

## 2021-11-30 RX ORDER — KETOROLAC TROMETHAMINE 15 MG/ML
15 INJECTION, SOLUTION INTRAMUSCULAR; INTRAVENOUS ONCE
Status: COMPLETED | OUTPATIENT
Start: 2021-11-30 | End: 2021-11-30

## 2021-11-30 RX ORDER — HEPARIN SODIUM (PORCINE) LOCK FLUSH IV SOLN 100 UNIT/ML 100 UNIT/ML
5-10 SOLUTION INTRAVENOUS
Status: DISCONTINUED | OUTPATIENT
Start: 2021-12-28 | End: 2021-11-30 | Stop reason: HOSPADM

## 2021-11-30 RX ORDER — MORPHINE SULFATE 15 MG/1
15 TABLET, FILM COATED, EXTENDED RELEASE ORAL EVERY 12 HOURS
Qty: 60 TABLET | Refills: 0 | Status: SHIPPED | OUTPATIENT
Start: 2021-11-30 | End: 2021-12-20

## 2021-11-30 RX ORDER — HEPARIN SODIUM (PORCINE) LOCK FLUSH IV SOLN 100 UNIT/ML 100 UNIT/ML
5-10 SOLUTION INTRAVENOUS
Status: DISCONTINUED | OUTPATIENT
Start: 2021-11-30 | End: 2021-11-30

## 2021-11-30 RX ORDER — SERTRALINE HYDROCHLORIDE 100 MG/1
200 TABLET, FILM COATED ORAL DAILY
Qty: 60 TABLET | Refills: 3 | Status: SHIPPED | OUTPATIENT
Start: 2021-11-30 | End: 2022-01-17

## 2021-11-30 RX ORDER — ONDANSETRON 2 MG/ML
4 INJECTION INTRAMUSCULAR; INTRAVENOUS EVERY 30 MIN PRN
Status: DISCONTINUED | OUTPATIENT
Start: 2021-11-30 | End: 2021-11-30 | Stop reason: HOSPADM

## 2021-11-30 RX ORDER — DEFERASIROX 360 MG/1
1440 TABLET, FILM COATED ORAL EVERY EVENING
Qty: 120 TABLET | Refills: 4 | Status: SHIPPED | OUTPATIENT
Start: 2021-11-30 | End: 2021-12-01

## 2021-11-30 RX ORDER — SODIUM CHLORIDE, SODIUM LACTATE, POTASSIUM CHLORIDE, CALCIUM CHLORIDE 600; 310; 30; 20 MG/100ML; MG/100ML; MG/100ML; MG/100ML
INJECTION, SOLUTION INTRAVENOUS CONTINUOUS
Status: DISCONTINUED | OUTPATIENT
Start: 2021-11-30 | End: 2021-11-30 | Stop reason: HOSPADM

## 2021-11-30 RX ORDER — OXYCODONE HYDROCHLORIDE 15 MG/1
15 TABLET ORAL EVERY 4 HOURS PRN
Qty: 50 TABLET | Refills: 0 | Status: SHIPPED | OUTPATIENT
Start: 2021-11-30 | End: 2021-12-10

## 2021-11-30 RX ADMIN — OXYCODONE HYDROCHLORIDE 20 MG: 10 TABLET ORAL at 01:03

## 2021-11-30 RX ADMIN — HYDROMORPHONE HYDROCHLORIDE 0.5 MG: 1 INJECTION, SOLUTION INTRAMUSCULAR; INTRAVENOUS; SUBCUTANEOUS at 07:31

## 2021-11-30 RX ADMIN — KETOROLAC TROMETHAMINE 15 MG: 15 INJECTION, SOLUTION INTRAMUSCULAR; INTRAVENOUS at 02:16

## 2021-11-30 RX ADMIN — SODIUM CHLORIDE, POTASSIUM CHLORIDE, SODIUM LACTATE AND CALCIUM CHLORIDE: 600; 310; 30; 20 INJECTION, SOLUTION INTRAVENOUS at 01:18

## 2021-11-30 RX ADMIN — HYDROMORPHONE HYDROCHLORIDE 0.5 MG: 1 INJECTION, SOLUTION INTRAMUSCULAR; INTRAVENOUS; SUBCUTANEOUS at 06:00

## 2021-11-30 RX ADMIN — HYDROMORPHONE HYDROCHLORIDE 0.5 MG: 1 INJECTION, SOLUTION INTRAMUSCULAR; INTRAVENOUS; SUBCUTANEOUS at 03:34

## 2021-11-30 ASSESSMENT — ENCOUNTER SYMPTOMS
FEVER: 0
SHORTNESS OF BREATH: 0
DYSURIA: 0
DIARRHEA: 0
BACK PAIN: 1
COUGH: 0
CHILLS: 0
ABDOMINAL PAIN: 0
PALPITATIONS: 0
NAUSEA: 0
DIAPHORESIS: 0
DIZZINESS: 0
CHEST TIGHTNESS: 0
DIFFICULTY URINATING: 0
VOMITING: 0
MYALGIAS: 1

## 2021-11-30 NOTE — PROGRESS NOTES
This is a recent snapshot of the patient's [facility] medical record.  For current drug dose and complete information and questions, call [phone] or In Basket pool, fv home infusion ([id number])  CSN Number:  [id number]

## 2021-11-30 NOTE — ED PROVIDER NOTES
[place] EMERGENCY DEPARTMENT ([facility])  11/29/21 ED  History     Chief Complaint   Patient presents with     Sickle Cell Pain Crisis     HPI  Jennifer Cervantes is a 21 year old female with a past medical history of sickle cell, cerebral infarct c/b hemiplegia and hemiparesis, PE (anticoagulated on Coumadin), and TIA who presents to the emergency department for evaluation of sickle cell pain crisis.  Patient was here in the ED yesterday. Her pain did resolve upon discharge. She states though with the cold weather this evening her pain again flared. No fevers or chills. No nausea or vomiting. She reports pain throughout her torso and back. This is very similar to previous exacerbations of her sickle cell pain. She denies any cold symptoms.    Past Medical History  Past Medical History:   Diagnosis Date     Anxiety      Bleeding disorder (H)      Blood clotting disorder (H)      Cerebral infarction (H) 2015     Cognitive developmental delay     low IQ. 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 (H)     right hand contractures     Iron overload due to repeated red blood cell transfusions      Migraines      Multiple subsegmental pulmonary emboli without acute cor pulmonale (H) 02/01/2021     Oppositional defiant behavior      Superficial venous thrombosis of arm, right 03/25/2021     Uncomplicated asthma      Past Surgical History:   Procedure Laterality Date     AS INSERT TUNNELED CV 2 CATH W/O PORT/PUMP       C BREAST REDUCTION (INCLUDES LIPO) TIER 3 Bilateral 04/23/2019     CHOLECYSTECTOMY       CV RIGHT HEART CATH MEASUREMENTS RECORDED N/A 11/18/2021    Procedure: Right Heart Cath;  Surgeon: Jackson Stauffer MD;  Location:  HEART CARDIAC CATH LAB     INSERT PORT VASCULAR ACCESS Left 4/21/2021    Procedure: INSERTION, VASCULAR ACCESS PORT (NOT SURE ON SIDE UNTIL REMOVAL);   Surgeon: Rajan More MD;  Location: [facility] OR     IR CHEST PORT PLACEMENT > 5 YRS OF AGE  4/21/2021     IR CVC NON TUNNEL LINE REMOVAL  5/6/2021     IR CVC NON TUNNEL PLACEMENT  04/07/2020     IR CVC NON TUNNEL PLACEMENT  4/30/2021     IR PORT REMOVAL LEFT  4/21/2021     REMOVE PORT VASCULAR ACCESS Left 4/21/2021    Procedure: REMOVAL, VASCULAR ACCESS PORT LEFT;  Surgeon: Rajan More MD;  Location: [facility] OR     REPAIR TENDON ELBOW Right 10/02/2019    Procedure: Right Elbow Flexor Lengthening, Flexor Pronator Slide Of Wrist and Finger, Thumb Adductor Lengthening;  Surgeon: Anai Franco MD;  Location: [facility] OR     TONSILLECTOMY Bilateral 10/02/2019    Procedure: Bilateral Tonsillectomy;  Surgeon: Farhana Guy MD;  Location: [facility] OR     acetaminophen (TYLENOL) 325 MG tablet  albuterol (PROAIR HFA/PROVENTIL HFA/VENTOLIN HFA) 108 (90 Base) MCG/ACT inhaler  albuterol (PROVENTIL) (2.5 MG/3ML) 0.083% neb solution  ARIPiprazole (ABILIFY) 2 MG tablet  budesonide-formoterol (SYMBICORT) 160-4.5 MCG/ACT Inhaler  diphenhydrAMINE (BENADRYL) 25 MG capsule  EPINEPHrine (ANY BX GENERIC EQUIV) 0.3 MG/0.3ML injection 2-pack  Hydroxyurea 1000 MG TABS  hydrOXYzine (ATARAX) 25 MG tablet  JADENU 360 MG tablet  lidocaine-prilocaine (EMLA) 2.5-2.5 % external cream  medroxyPROGESTERone (DEPO-PROVERA) 150 MG/ML IM injection  morphine (MS CONTIN) 15 MG CR tablet  naloxone (NARCAN) 4 MG/0.1ML nasal spray  omeprazole (PRILOSEC) 20 MG DR capsule  ondansetron (ZOFRAN) 8 MG tablet  oxyCODONE IR (ROXICODONE) 15 MG tablet  sertraline (ZOLOFT) 100 MG tablet  warfarin ANTICOAGULANT (COUMADIN) 5 MG tablet  warfarin ANTICOAGULANT (COUMADIN) 7.5 MG tablet      Allergies   Allergen Reactions     Contrast Dye      Hives and breathing issues     Fish-Derived Products Hives     Seafood Hives     Diagnostic X-Ray Materials      Gadolinium      Family History  Family History   Problem Relation Age of Onset     Sickle Cell Trait Mother       "Hypertension Mother      Asthma Mother      Sickle Cell Trait Father      Social History   Social History     Tobacco Use     Smoking status: Never Smoker     Smokeless tobacco: Never Used   Substance Use Topics     Alcohol use: Not Currently     Alcohol/week: 0.0 standard drinks     Drug use: Never      Past medical history, past surgical history, medications, allergies, family history, and social history were reviewed with the patient. No additional pertinent items.       Review of Systems   Constitutional: Negative for chills, diaphoresis and fever.   HENT: Negative for congestion.    Eyes: Negative for visual disturbance.   Respiratory: Negative for cough, chest tightness and shortness of breath.    Cardiovascular: Negative for chest pain and palpitations.   Gastrointestinal: Negative for abdominal pain, diarrhea, nausea and vomiting.   Genitourinary: Negative for difficulty urinating and dysuria.   Musculoskeletal: Positive for back pain and myalgias.   Neurological: Negative for dizziness and syncope.   Psychiatric/Behavioral: Negative for behavioral problems and suicidal ideas.     A complete review of systems was performed with pertinent positives and negatives noted in the HPI, and all other systems negative.    Physical Exam   BP: 135/73  Pulse: 111  Temp: 99.4  F (37.4  C)  Resp: 16  Height: 162.6 cm (5' 4\")  Weight: 77.1 kg (170 lb)  SpO2: 90 %  Physical Exam  Constitutional:       General: She is not in acute distress.     Appearance: She is not diaphoretic.   HENT:      Head: Normocephalic.      Mouth/Throat:      Pharynx: No oropharyngeal exudate.   Eyes:      Extraocular Movements: Extraocular movements intact.   Cardiovascular:      Rate and Rhythm: Normal rate and regular rhythm.      Heart sounds: Normal heart sounds.   Pulmonary:      Effort: No respiratory distress.      Breath sounds: Normal breath sounds.   Abdominal:      General: There is no distension.      Palpations: Abdomen is soft.      " Tenderness: There is no abdominal tenderness.   Musculoskeletal:         General: No deformity.      Cervical back: Neck supple.   Skin:     General: Skin is warm and dry.   Neurological:      Mental Status: She is alert.      Comments: alert   Psychiatric:         Behavior: Behavior normal.       ED Course      Procedures            Results for orders placed or performed during the hospital encounter of 11/30/21   Basic metabolic panel     Status: Abnormal   Result Value Ref Range    Sodium 135 133 - 144 mmol/L    Potassium 3.2 (L) 3.4 - 5.3 mmol/L    Chloride 108 94 - 109 mmol/L    Carbon Dioxide (CO2) 20 20 - 32 mmol/L    Anion Gap 7 3 - 14 mmol/L    Urea Nitrogen 7 7 - 30 mg/dL    Creatinine 0.56 0.52 - 1.04 mg/dL    Calcium 8.8 8.5 - 10.1 mg/dL    Glucose 100 (H) 70 - 99 mg/dL    GFR Estimate >90 >60 mL/min/1.73m2   Reticulocyte count     Status: Abnormal   Result Value Ref Range    % Reticulocyte 18.0 (H) 0.5 - 2.0 %    Absolute Reticulocyte 0.432 (H) 0.025 - 0.095 10e6/uL   CBC with platelets and differential     Status: Abnormal   Result Value Ref Range    WBC Count 15.5 (H) 4.0 - 11.0 10e3/uL    RBC Count 2.60 (L) 3.80 - 5.20 10e6/uL    Hemoglobin 7.7 (L) 11.7 - 15.7 g/dL    Hematocrit 21.4 (L) 35.0 - 47.0 %    MCV 82 78 - 100 fL    MCH 29.6 26.5 - 33.0 pg    MCHC 36.0 31.5 - 36.5 g/dL    RDW 21.6 (H) 10.0 - 15.0 %    Platelet Count 513 (H) 150 - 450 10e3/uL    % Neutrophils 73 %    % Lymphocytes 12 %    % Monocytes 8 %    % Eosinophils 5 %    % Basophils 1 %    % Immature Granulocytes 1 %    NRBCs per 100 WBC 2 (H) <1 /100    Absolute Neutrophils 11.3 (H) 1.6 - 8.3 10e3/uL    Absolute Lymphocytes 1.8 0.8 - 5.3 10e3/uL    Absolute Monocytes 1.3 0.0 - 1.3 10e3/uL    Absolute Eosinophils 0.8 (H) 0.0 - 0.7 10e3/uL    Absolute Basophils 0.2 0.0 - 0.2 10e3/uL    Absolute Immature Granulocytes 0.1 (H) <=0.0 10e3/uL    Absolute NRBCs 0.3 10e3/uL   Extra Blue Top Tube     Status: None   Result Value Ref Range     Hold Specimen [facility]    CBC with platelets differential     Status: Abnormal    Narrative    The following orders were created for panel order CBC with platelets differential.  Procedure                               Abnormality         Status                     ---------                               -----------         ------                     CBC with platelets and d...[[id number]]  Abnormal            Final result                 Please view results for these tests on the individual orders.   Extra Tube     Status: None    Narrative    The following orders were created for panel order Extra Tube.  Procedure                               Abnormality         Status                     ---------                               -----------         ------                     Extra Blue Top Tube[[id number]]                              Final result                 Please view results for these tests on the individual orders.     Medications   ketorolac (TORADOL) injection 15 mg (15 mg Intravenous Given 11/30/21 0216)   oxyCODONE IR (ROXICODONE) tablet 20 mg (20 mg Oral Given 11/30/21 0103)   HYDROmorphone (PF) (DILAUDID) injection 0.5 mg (0.5 mg Intravenous Given 11/30/21 0731)        Assessments & Plan (with Medical Decision Making)   21-year-old female presents to us with a chief complaint of sickle cell pain crisis.  This is similar to her other sickle cell pain exacerbations.  She was seen yesterday and pain improved.  Exposure to the cold restarted her discomfort.  Vital signs today were normal.  Patient's labs are at her baseline.  Initial 20 mg of oxycodone p.o. given to her per her normal pain protocol did not seem to help with her symptoms.  We will try IV Dilaudid to see if that resolves her pain.  The patient still desires pain control and discharge home as opposed to admission at this time.  Patient's care will be signed out to the oncoming physician pending disposition plan.    I have reviewed the nursing  notes. I have reviewed the findings, diagnosis, plan and need for follow up with the patient.    Discharge Medication List as of 11/30/2021  8:19 AM          Final diagnoses:   Sickle cell disease with crisis (H)       --    [facility] EMERGENCY DEPARTMENT  11/29/2021     Mykel Luz,   11/30/21 [id number]

## 2021-11-30 NOTE — PROGRESS NOTES
Jennifer is a 21 year old who is being evaluated via a billable video visit.      How would you like to obtain your AVS? MyChart  If the video visit is dropped, the invitation should be resent by: Text to cell phone: [phone]  Will anyone else be joining your video visit? 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      She was admitted 2/1/21-2/3/21 with a new PE, started on Rivaroxaban. Switched to Eliquis 3/25/21 with RUE DVT.     She was admitted 4/26/21-5/11/21 with sickle cell pain crisis complicated by worsening PE in setting of low Apixaban levels, acute hypoxic respiratory failure, pneumonia, acute chest syndrome s/p exchange transfusion on 4/30 and 5/4. After 2nd exchange her oxygen requirement dramatically improved from 20L to 1-2L 5/5 and she was off oxygen as of 5/6.      She was admitted 7/13/21-7/25/21 for acute on chronic PE, switched to dabigitran + ASA.      Due to worsening hypoxia she underwent VQ scan 11/10/21 which showed acute on chronic PE for which she was admitted 11/10-11/21. During admission was switched to warfarin. Echo WNL and no signs of pulm HTN on right heart cath. She did receive 1 unit pRBC for hgb 6.4 during admission.    She was called today for hematology follow-up.    Interval History:  Jennifer was called today for follow-up. Overall she is doing OK. She has continued to have all over pain the last week and has been in ED multiple times with some improvement. She attributes her pain to  the cold weather. She was in the ED this morning and feels better now. She denies any breathing concerns. She is tolerating Coumadin well-has more bruising but no bleeding concerns. Has had multiple dose adjustments, coumadin clinic following. She denies any fevers or GI concerns. She has been having issues getting Jadenu and Desferal due to insurance. Her port is tilted per nursing and is difficult to use. She feels like her depression is worse and is wanting to go back to Zoloft (trialed Cymbalta instead of Zoloft to see if we could get pain relief as well).    Current Outpatient Medications   Medication Sig Dispense Refill     acetaminophen (TYLENOL) 325 MG tablet Take 2 tablets (650 mg) by mouth every 6 hours as needed for mild pain 120 tablet 3     albuterol (PROAIR HFA/PROVENTIL HFA/VENTOLIN HFA) 108 (90 Base) MCG/ACT inhaler Inhale 2 puffs into the lungs every 6 hours as needed for shortness of breath / dyspnea or wheezing 8.5 g 3     albuterol (PROVENTIL) (2.5 MG/3ML) 0.083% neb solution Take 1 vial (2.5 mg) by nebulization every 6 hours as needed for shortness of breath / dyspnea or wheezing 12 mL 4     ARIPiprazole (ABILIFY) 2 MG tablet Take 1 tablet (2 mg) by mouth daily 30 tablet 3     budesonide-formoterol (SYMBICORT) 160-4.5 MCG/ACT Inhaler Inhale 2 puffs into the lungs 2 times daily 10.2 g 3     diphenhydrAMINE (BENADRYL) 25 MG capsule Take 1-2 capsules (25-50 mg) by mouth nightly as needed for sleep 60 capsule 3     DULoxetine (CYMBALTA) 30 MG capsule Take 1 capsule (30 mg) by mouth 2 times daily 60 capsule 3     EPINEPHrine (ANY BX GENERIC EQUIV) 0.3 MG/0.3ML injection 2-pack Inject 0.3 mLs (0.3 mg) into the muscle as needed for anaphylaxis 1 each 1     Hydroxyurea 1000 MG TABS Take 2,000 mg by mouth daily 60 tablet 3     hydrOXYzine (ATARAX) 25 MG tablet Take 1 tablet (25 mg) by mouth 3 times daily as needed for anxiety 30 tablet 1     JADENU 360 MG tablet Take 4 tablets (1,440 mg) by mouth every  evening 120 tablet 4     lidocaine-prilocaine (EMLA) 2.5-2.5 % external cream Apply topically once for 1 dose Use for port site as needed (Patient not taking: Reported on 11/10/2021) 30 g 1     medroxyPROGESTERone (DEPO-PROVERA) 150 MG/ML IM injection Inject 150 mg into the muscle       morphine (MS CONTIN) 15 MG CR tablet Take 1 tablet (15 mg) by mouth every 12 hours 60 tablet 0     naloxone (NARCAN) 4 MG/0.1ML nasal spray Spray 1 spray (4 mg) into one nostril alternating nostrils once as needed for opioid reversal every 2-3 minutes until assistance arrives 0.2 mL 1     omeprazole (PRILOSEC) 20 MG DR capsule Take 1 capsule (20 mg) by mouth daily 30 capsule 1     ondansetron (ZOFRAN) 8 MG tablet Take 1 tablet (8 mg) by mouth every 8 hours as needed 30 tablet 1     oxyCODONE IR (ROXICODONE) 15 MG tablet Take 1 tablet (15 mg) by mouth every 4 hours as needed for severe pain Goal 4 per day. Max 6 per day. 50 tablet 0     warfarin ANTICOAGULANT (COUMADIN) 5 MG tablet Take 1 tablet (5 mg) by mouth four times a week Take 5 mg on Tues, Thurs, Sat, Sun. INR check on Tues 11/23 so dose can change. 16 tablet 0     warfarin ANTICOAGULANT (COUMADIN) 7.5 MG tablet Take 1 tablet (7.5 mg) by mouth three times a week Take 7.5 mg on Mon, Wed, Fri. INR check on Tues 11/23 so dose can change. 12 tablet 0       Past Medical History  Past Medical History:   Diagnosis Date     Anxiety      Bleeding disorder (H)      Blood clotting disorder (H)      Cerebral infarction (H) 2015     Cognitive developmental delay     low IQ. 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 (H)     right hand contractures     Iron overload due to repeated red blood cell transfusions      Migraines      Multiple subsegmental pulmonary emboli without acute cor pulmonale (H) 02/01/2021     Oppositional defiant behavior      Superficial  venous thrombosis of arm, right 03/25/2021     Uncomplicated asthma      Past Surgical History:   Procedure Laterality Date     AS INSERT TUNNELED CV 2 CATH W/O PORT/PUMP       C BREAST REDUCTION (INCLUDES LIPO) TIER 3 Bilateral 04/23/2019     CHOLECYSTECTOMY       CV RIGHT HEART CATH MEASUREMENTS RECORDED N/A 11/18/2021    Procedure: Right Heart Cath;  Surgeon: Jackson Stauffer MD;  Location:  HEART CARDI[facility] CATH LAB     INSERT PORT VASCULAR ACCESS Left 4/21/2021    Procedure: INSERTION, VASCULAR ACCESS PORT (NOT SURE ON SIDE UNTIL REMOVAL);  Surgeon: Rajan More MD;  Location: [facility] OR     IR CHEST PORT PLACEMENT > 5 YRS OF AGE  4/21/2021     IR CVC NON TUNNEL LINE REMOVAL  5/6/2021     IR CVC NON TUNNEL PLACEMENT  04/07/2020     IR CVC NON TUNNEL PLACEMENT  4/30/2021     IR PORT REMOVAL LEFT  4/21/2021     REMOVE PORT VASCULAR ACCESS Left 4/21/2021    Procedure: REMOVAL, VASCULAR ACCESS PORT LEFT;  Surgeon: Rajan More MD;  Location: [facility] OR     REPAIR TENDON ELBOW Right 10/02/2019    Procedure: Right Elbow Flexor Lengthening, Flexor Pronator Slide Of Wrist and Finger, Thumb Adductor Lengthening;  Surgeon: Anai Franco MD;  Location: [facility] OR     TONSILLECTOMY Bilateral 10/02/2019    Procedure: Bilateral Tonsillectomy;  Surgeon: Farhana Guy MD;  Location: [facility] OR     Allergies   Allergen Reactions     Contrast Dye      Hives and breathing issues     Fish-Derived Products Hives     Seafood Hives     Diagnostic X-Ray Materials      Gadolinium      Social History   Social History     Tobacco Use     Smoking status: Never Smoker     Smokeless tobacco: Never Used   Substance Use Topics     Alcohol use: Not Currently     Alcohol/week: 0.0 standard drinks     Drug use: Never      Past medical history and social history were reviewed.    Physical Examination:  There were no vitals taken for this visit.  Wt Readings from Last 10 Encounters:   11/28/21 77.1 kg (170 lb)   11/25/21 77.1 kg (170  lb)   11/24/21 76.5 kg (168 lb 9.6 oz)   11/23/21 76.1 kg (167 lb 11.2 oz)   11/22/21 77.3 kg (170 lb 8 oz)   11/21/21 76.5 kg (168 lb 9.6 oz)   11/03/21 75.8 kg (167 lb 3.2 oz)   11/01/21 75.8 kg (167 lb)   10/19/21 77 kg (169 lb 11.2 oz)   10/13/21 77.2 kg (170 lb 4.8 oz)     Video physical exam  General: Patient appears well in no acute distress.   Skin: No visualized rash or lesions on visualized skin  Eyes: EOMI, no erythema, sclera icterus or discharge noted  Resp: Appears to be breathing comfortably without accessory muscle usage, speaking in full sentences, no cough  MSK: Appears to have normal range of motion based on visualized movements  Neurologic: No apparent tremors, facial movements symmetric  Psych: affect normal, alert and oriented    The rest of a comprehensive physical examination is deferred due to PHE (public health emergency) video restrictions    Laboratory Data:  Results for HIMANSHU LA (MRN [id number]) as of 11/30/2021 14:22   11/30/2021 01:17   Sodium 135   Potassium 3.2 (L)   Chloride 108   Carbon Dioxide 20   Urea Nitrogen 7   Creatinine 0.56   GFR Estimate >90   Calcium 8.8   Anion Gap 7   Glucose 100 (H)   WBC 15.5 (H)   Hemoglobin 7.7 (L)   Hematocrit 21.4 (L)   Platelet Count 513 (H)   RBC Count 2.60 (L)   MCV 82   MCH 29.6   MCHC 36.0   RDW 21.6 (H)   % Neutrophils 73   % Lymphocytes 12   % Monocytes 8   % Eosinophils 5   % Basophils 1   Absolute Basophils 0.2   Absolute Eosinophils 0.8 (H)   Absolute Immature Granulocytes 0.1 (H)   Absolute Lymphocytes 1.8   Absolute Monocytes 1.3   % Immature Granulocytes 1   Absolute Neutrophils 11.3 (H)   Absolute NRBCs 0.3   NRBCs per 100 WBC 2 (H)   % Retic 18.0 (H)   Absolute Retic 0.432 (H)       Assessment and Plan:  1. Sickle Cell Disease  HgbSS complicated by hx stroke, acute chest, iron overload, chronic pain, acute on chronic pulmonary embolism with multiple hospitalizations. She has been in ED a few times this last week for pain  crisis which she attributes to weather.      Labs: Reviewed from ED, generally stable other than slightly low potassium. WBC remains elevated but improving. No symptoms of infection.      Meds: Stopped Voxeletor. Continue Hydrea 2000mg daily. Continue Jr monthly-next scheduled mid December. Will work with [facility] about insurance issues for Jadenu and Molly.      Pain Control: Continue MSContin 15mg BID. Continue Oxycodone to 15mg PRN max 6 per day-#50 tabs per week. DO NOT INCREASE. Continue Cymbalta 30mg BID. Continue Tylenol PRN.      Continue plan to avoid IV narcotics as able in ED to discourage frequent ED usage.      Follow-up: Continue ANDREAS or MD monthly-next visit 12/20/21 with Dr. Duncan.      2. Neuro  History of stroke. Off ASA now that she is on warfarin.      Dr. Pierce doing botox for spasticity and symptoms improving.      Migraines, chronic issue, saw neurology. Has follow-up with specialist scheduled in Jan     3. Psych  History of anxiety and depression. Feels worse. Will switch back to Zoloft and stop Cymbalta. Continue Abilify.     Follows with outside therapist.     OK to use Benadryl PRN insomnia.      4. Pulm  History of asthma, continue Symbicort and Albuterol PRN. Pulm visit scheduled. Has home O2. Will see about getting her a home pulse oximeter.      5. Vascular  Acute on chronic PE with DOAC failure, now on warfarin. Anticoag clinic managing. Dyspnea improved. O2 sats from ED improved.     Will work with IR about way to evaluate port with contrast allergy.     35 minutes spent on the date of the encounter doing chart review, review of test results, interpretation of tests, patient visit and documentation     Andrei Machado PA-C  Department of Hematology and Oncology  [facility]

## 2021-11-30 NOTE — PROGRESS NOTES
ANTICOAGULATION  MANAGEMENT: Discharge Review    Jennifer Cervantes chart reviewed for anticoagulation continuity of care    Emergency room visit on 11/30/21 for sickle cell pain crisis.    Discharge disposition: Home    Results:    Recent labs: (last 7 days)     11/25/21  1806 11/26/21  0925 11/29/21  0652   INR 1.28* 1.30* 1.75*     Anticoagulation inpatient management:     not applicable     Anticoagulation discharge instructions:     Warfarin dosing: home regimen continued   Bridging: No   INR goal change: No      Medication changes affecting anticoagulation: Yes: Received Ketorolac in ED-Concurrent use of ANTICOAGULANTS and NSAIDS may result in increased risk of bleeding per literature review    Additional factors affecting anticoagulation: Yes: ongoing chronic pain with flares    Plan     No adjustment to anticoagulation plan needed    Patient not contacted-spoke with patient yesterday after ED visit.     No adjustment to Anticoagulation Calendar was required-pt to recheck INR 12/2.    PRINCESS MORENO RN

## 2021-11-30 NOTE — ED TRIAGE NOTES
Pt arrives to ED with c/o sickle cell crisis, pt was here earlier today and now is having breakthrough pain

## 2021-12-01 ENCOUNTER — NURSE TRIAGE (OUTPATIENT)
Dept: ONCOLOGY | Facility: CLINIC | Age: 21
End: 2021-12-01
Payer: COMMERCIAL

## 2021-12-01 ENCOUNTER — PATIENT OUTREACH (OUTPATIENT)
Dept: ONCOLOGY | Facility: CLINIC | Age: 21
End: 2021-12-01

## 2021-12-01 ENCOUNTER — INFUSION THERAPY VISIT (OUTPATIENT)
Dept: TRANSPLANT | Facility: CLINIC | Age: 21
End: 2021-12-01
Attending: STUDENT IN AN ORGANIZED HEALTH CARE EDUCATION/TRAINING PROGRAM
Payer: COMMERCIAL

## 2021-12-01 ENCOUNTER — ANCILLARY PROCEDURE (OUTPATIENT)
Dept: GENERAL RADIOLOGY | Facility: CLINIC | Age: 21
End: 2021-12-01
Attending: PEDIATRICS
Payer: COMMERCIAL

## 2021-12-01 VITALS
DIASTOLIC BLOOD PRESSURE: 74 MMHG | SYSTOLIC BLOOD PRESSURE: 119 MMHG | RESPIRATION RATE: 16 BRPM | HEART RATE: 100 BPM | TEMPERATURE: 98.8 F | OXYGEN SATURATION: 92 %

## 2021-12-01 DIAGNOSIS — E83.111 IRON OVERLOAD DUE TO REPEATED RED BLOOD CELL TRANSFUSIONS: ICD-10-CM

## 2021-12-01 DIAGNOSIS — D57.00 SICKLE CELL CRISIS (H): Primary | ICD-10-CM

## 2021-12-01 DIAGNOSIS — G81.10 SPASTIC HEMIPLEGIA, UNSPECIFIED ETIOLOGY, UNSPECIFIED LATERALITY (H): Primary | ICD-10-CM

## 2021-12-01 DIAGNOSIS — D57.00 SICKLE CELL CRISIS (H): ICD-10-CM

## 2021-12-01 DIAGNOSIS — D57.00 SICKLE CELL PAIN CRISIS (H): ICD-10-CM

## 2021-12-01 PROCEDURE — 258N000003 HC RX IP 258 OP 636: Performed by: PEDIATRICS

## 2021-12-01 PROCEDURE — 250N000013 HC RX MED GY IP 250 OP 250 PS 637: Performed by: PEDIATRICS

## 2021-12-01 PROCEDURE — 96374 THER/PROPH/DIAG INJ IV PUSH: CPT

## 2021-12-01 PROCEDURE — 250N000011 HC RX IP 250 OP 636: Performed by: PEDIATRICS

## 2021-12-01 PROCEDURE — 96361 HYDRATE IV INFUSION ADD-ON: CPT

## 2021-12-01 PROCEDURE — 96376 TX/PRO/DX INJ SAME DRUG ADON: CPT

## 2021-12-01 PROCEDURE — 71045 X-RAY EXAM CHEST 1 VIEW: CPT | Performed by: RADIOLOGY

## 2021-12-01 RX ORDER — MORPHINE SULFATE 2 MG/ML
2 INJECTION, SOLUTION INTRAMUSCULAR; INTRAVENOUS
Status: COMPLETED | OUTPATIENT
Start: 2021-12-01 | End: 2021-12-01

## 2021-12-01 RX ORDER — DIPHENHYDRAMINE HCL 25 MG
25 CAPSULE ORAL
Status: CANCELLED
Start: 2022-01-01

## 2021-12-01 RX ORDER — HEPARIN SODIUM (PORCINE) LOCK FLUSH IV SOLN 100 UNIT/ML 100 UNIT/ML
5 SOLUTION INTRAVENOUS
Status: DISCONTINUED | OUTPATIENT
Start: 2021-12-01 | End: 2021-12-01 | Stop reason: HOSPADM

## 2021-12-01 RX ORDER — ONDANSETRON 8 MG/1
8 TABLET, ORALLY DISINTEGRATING ORAL
Status: COMPLETED | OUTPATIENT
Start: 2021-12-01 | End: 2021-12-01

## 2021-12-01 RX ORDER — ONDANSETRON 8 MG/1
8 TABLET, FILM COATED ORAL
Status: CANCELLED
Start: 2022-01-01

## 2021-12-01 RX ORDER — MORPHINE SULFATE 2 MG/ML
2 INJECTION, SOLUTION INTRAMUSCULAR; INTRAVENOUS
Status: CANCELLED
Start: 2022-01-01

## 2021-12-01 RX ORDER — DEFERASIROX 360 MG/1
1440 TABLET, FILM COATED ORAL EVERY EVENING
Qty: 120 TABLET | Refills: 4 | COMMUNITY
Start: 2021-12-01 | End: 2022-03-21

## 2021-12-01 RX ORDER — HEPARIN SODIUM (PORCINE) LOCK FLUSH IV SOLN 100 UNIT/ML 100 UNIT/ML
5 SOLUTION INTRAVENOUS
Status: CANCELLED | OUTPATIENT
Start: 2022-01-01

## 2021-12-01 RX ORDER — HEPARIN SODIUM,PORCINE 10 UNIT/ML
5 VIAL (ML) INTRAVENOUS
Status: CANCELLED | OUTPATIENT
Start: 2022-01-01

## 2021-12-01 RX ORDER — DIPHENHYDRAMINE HCL 25 MG
25 CAPSULE ORAL
Status: COMPLETED | OUTPATIENT
Start: 2021-12-01 | End: 2021-12-01

## 2021-12-01 RX ADMIN — Medication 5 ML: at 15:26

## 2021-12-01 RX ADMIN — ONDANSETRON 8 MG: 8 TABLET, ORALLY DISINTEGRATING ORAL at 13:10

## 2021-12-01 RX ADMIN — MORPHINE SULFATE 2 MG: 2 INJECTION, SOLUTION INTRAMUSCULAR; INTRAVENOUS at 15:23

## 2021-12-01 RX ADMIN — MORPHINE SULFATE 2 MG: 2 INJECTION, SOLUTION INTRAMUSCULAR; INTRAVENOUS at 14:18

## 2021-12-01 RX ADMIN — MORPHINE SULFATE 2 MG: 2 INJECTION, SOLUTION INTRAMUSCULAR; INTRAVENOUS at 13:11

## 2021-12-01 RX ADMIN — DIPHENHYDRAMINE HYDROCHLORIDE 25 MG: 25 CAPSULE ORAL at 13:10

## 2021-12-01 RX ADMIN — DEXTROSE AND SODIUM CHLORIDE 500 ML: 5; 450 INJECTION, SOLUTION INTRAVENOUS at 13:09

## 2021-12-01 ASSESSMENT — PAIN SCALES - GENERAL: PAINLEVEL: EXTREME PAIN (9)

## 2021-12-01 NOTE — PROGRESS NOTES
Per Andrei: pt needs oximeter for home use, writer called pt and she stated she is only using oxygen prn. She thinks her concentrator and tank is from  [facility], writer called home medical supply and was told message will be sent to team for follow-up.    Pt completed chest xray and stated infusion RN today was able to access port just fine.    Informed her will update Andrei and Dr. Duncan about this if they want to proceed with port check through IR. Reminded her to  Jadenu from the pharmacy and she stated she is getting in her cab now but coming back tomorrow to pu pain meds and will get Jadenu then too.

## 2021-12-01 NOTE — TELEPHONE ENCOUNTER
[facility] Triage    Jennifer is scheduled for IVF and pain meds at 1230 in BMT today.  She states she will arrange for her own ride and will not be late.

## 2021-12-01 NOTE — PROGRESS NOTES
Per Andrei Machado PA:     -She told me that she hasn't gotten a refill on Jadenu nor her Desferal due to insurance issues. Any way we can look into this?     -Her port is tilted per nursing, however with her contrast allergy I can't do port check or would need to do pre-meds but is seems risky given her significant allergy. Any ideas on how we could fix her port without contrast? Maybe refer to IR?     Per Dr. Duncan: desferal to start this weekend, writer called home infusion and reached vm, lm to call back to confirm if they have pt on their schedule this weekend.    Called [facility] pharmacy and informed that Jadenu script was written for brand name only thus prompting a PA, pharmacist confirmed pt had picked up generic version in the past. Writer gave verbal order for generic and pt has $0 copay, they will contact pt to  Jadenu today.    Writer called IR to ask about port check with contrast allergy; per IR Nirmala recommend chest xray to confirm port is NOT tilted, and if nursing still having difficulty accessing, then place IR referral for port check and their team will coordinate. Xray order entered, pt to get xray after her infusion today, scheduling messaged. Infusion RN in BMT aware to also remind pt to get xray done before she leaves the clinic today.

## 2021-12-01 NOTE — NURSING NOTE
"Oncology Rooming Note    December 1, 2021 3:04 PM   Jennifer Cervantes is a 21 year old female who presents for:    Chief Complaint   Patient presents with     Infusion     add on infusion for pain medications and IV fluids.  hx sickle cell.     Initial Vitals: /72 (BP Location: Left arm, Patient Position: Sitting, Cuff Size: Adult Regular)   Pulse 82   Temp 98.8  F (37.1  C) (Oral)   Resp 16   SpO2 99%  Estimated body mass index is 29.18 kg/m  as calculated from the following:    Height as of an earlier encounter on 12/1/21: 1.626 m (5' 4\").    Weight as of an earlier encounter on 12/1/21: 77.1 kg (170 lb). There is no height or weight on file to calculate BSA.  Extreme Pain (9) Comment: Data Unavailable   No LMP recorded. Patient has had an injection.  Allergies reviewed: Yes  Medications reviewed: Yes    Medications: has refills being filled in retail pharmacy, one that she can fill tomorrow  Pharmacy name entered into [facility]:    [facility] - [place], [place] - [address] [facility] [place], [place] - [address] [facility] [phone]    Clinical concerns: none       Jamaica Napoles RN              "

## 2021-12-01 NOTE — LETTER
12/1/2021         RE: Jennifer Cervantes  [address]  [place] [address]        Dear Colleague,    Thank you for referring your patient, Jennifer Cervantes, to the [facility]. Please see a copy of my visit note below.    Infusion Nursing Note:  Jennifer Cervantes presents today for add on IV fluids and pain medications.    Patient seen by provider today: No   present during visit today: Not Applicable.    Note:   Pt received 25 mg benadryl po and 8 mg zofran ODT.    Pt received 500 mL D5.45 over 2 hours.    Pt received 2 mg morphine IV every hour x 3 doses.      Intravenous Access:  Implanted Port.    Treatment Conditions:  No labs ordered.      Post Infusion Assessment:  Patient tolerated infusion without incident.  Blood return noted pre and post infusion.  Site patent and intact, free from redness, edema or discomfort.  No evidence of extravasations.  Access discontinued per protocol.       Discharge Plan:   Copy of AVS reviewed with patient and/or family.  Patient discharged in stable condition accompanied by: self.  Departure Mode: Ambulatory.      Jamaica Napoles RN                          Again, thank you for allowing me to participate in the care of your patient.        Sincerely,        [facility]

## 2021-12-01 NOTE — PROGRESS NOTES
Home infusion pharmacist called back and lvm that pt did get Desferal infusions on 10/24, was hospitalized 11/10-11/21 and missed one infusion, then infused again on 11/26. Not due again until 12/10.

## 2021-12-01 NOTE — PROGRESS NOTES
Infusion Nursing Note:  Jennifer Cervantes presents today for add on IV fluids and pain medications.    Patient seen by provider today: No   present during visit today: Not Applicable.    Note:   Pt received 25 mg benadryl po and 8 mg zofran ODT.    Pt received 500 mL D5.45 over 2 hours.    Pt received 2 mg morphine IV every hour x 3 doses.      Intravenous Access:  Implanted Port.    Treatment Conditions:  No labs ordered.      Post Infusion Assessment:  Patient tolerated infusion without incident.  Blood return noted pre and post infusion.  Site patent and intact, free from redness, edema or discomfort.  No evidence of extravasations.  Access discontinued per protocol.       Discharge Plan:   Copy of AVS reviewed with patient and/or family.  Patient discharged in stable condition accompanied by: self.  Departure Mode: Ambulatory.      Jamaica Napoles RN

## 2021-12-01 NOTE — TELEPHONE ENCOUNTER
[facility] Triage    Calls in to request IVF and pain meds in the infusion center.    Has low back pain  Started yesterday  Has taken morphine and oxycodone around 6am with no relief in pain.    No SOB, no chest pain   No numbness or tingling in extremities   Denies other symptoms    Was seen in follow up with Andrei Machado yesterday afternoon. Last ED visit on 11/28, last infusion on 11/26.    Next appt with Dr. Duncan on 12/20.    Approved per protocol for add on infusion.

## 2021-12-02 ENCOUNTER — NURSE TRIAGE (OUTPATIENT)
Dept: ONCOLOGY | Facility: CLINIC | Age: 21
End: 2021-12-02
Payer: COMMERCIAL

## 2021-12-02 ENCOUNTER — HOME INFUSION (PRE-WILLOW HOME INFUSION) (OUTPATIENT)
Dept: PHARMACY | Facility: CLINIC | Age: 21
End: 2021-12-02

## 2021-12-02 NOTE — TELEPHONE ENCOUNTER
[facility]    Patient calling in to request to be added on for IVF + pain meds today for typical sickle cell pain.    Still has low back pain that started Tuesday. Has taken morphine and oxycodone with no relief.    Per protocol unable to add on for IVF + pain meds since patient was just in for infusion yesterday.    Dr. Duncan paged.   Recommendations: okay to add on for IVF + pain meds.

## 2021-12-02 NOTE — CONFIDENTIAL NOTE
Jennifer called in and was wondering why she did not get an infusion since she was first on the list? What did we expect her to do?  Advised to go to ER or call back tomorrow morning for infusion if still having pain.

## 2021-12-03 ENCOUNTER — ANTICOAGULATION THERAPY VISIT (OUTPATIENT)
Dept: ANTICOAGULATION | Facility: CLINIC | Age: 21
End: 2021-12-03

## 2021-12-03 ENCOUNTER — HOSPITAL ENCOUNTER (EMERGENCY)
Facility: CLINIC | Age: 21
Discharge: HOME | End: 2021-12-03
Attending: EMERGENCY MEDICINE | Admitting: EMERGENCY MEDICINE
Payer: COMMERCIAL

## 2021-12-03 VITALS
BODY MASS INDEX: 29.02 KG/M2 | SYSTOLIC BLOOD PRESSURE: 120 MMHG | HEIGHT: 64 IN | RESPIRATION RATE: 16 BRPM | OXYGEN SATURATION: 97 % | TEMPERATURE: 97.9 F | DIASTOLIC BLOOD PRESSURE: 72 MMHG | HEART RATE: 100 BPM | WEIGHT: 170 LBS

## 2021-12-03 DIAGNOSIS — D57.00 SICKLE CELL PAIN CRISIS (H): ICD-10-CM

## 2021-12-03 DIAGNOSIS — E87.6 HYPOKALEMIA: ICD-10-CM

## 2021-12-03 DIAGNOSIS — I27.82 CHRONIC PULMONARY EMBOLISM WITHOUT ACUTE COR PULMONALE, UNSPECIFIED PULMONARY EMBOLISM TYPE (H): Primary | ICD-10-CM

## 2021-12-03 LAB
ANION GAP SERPL CALCULATED.3IONS-SCNC: 6 MMOL/L (ref 3–14)
APTT PPP: 46 SECONDS (ref 22–38)
BASOPHILS # BLD AUTO: 0.2 10E3/UL (ref 0–0.2)
BASOPHILS NFR BLD AUTO: 2 %
BUN SERPL-MCNC: 6 MG/DL (ref 7–30)
CALCIUM SERPL-MCNC: 8.2 MG/DL (ref 8.5–10.1)
CHLORIDE BLD-SCNC: 112 MMOL/L (ref 94–109)
CO2 SERPL-SCNC: 22 MMOL/L (ref 20–32)
CREAT SERPL-MCNC: 0.6 MG/DL (ref 0.52–1.04)
EOSINOPHIL # BLD AUTO: 0.8 10E3/UL (ref 0–0.7)
EOSINOPHIL NFR BLD AUTO: 6 %
ERYTHROCYTE [DISTWIDTH] IN BLOOD BY AUTOMATED COUNT: 25.5 % (ref 10–15)
GFR SERPL CREATININE-BSD FRML MDRD: >90 ML/MIN/1.73M2
GLUCOSE BLD-MCNC: 107 MG/DL (ref 70–99)
HCT VFR BLD AUTO: 20.9 % (ref 35–47)
HGB BLD-MCNC: 7.5 G/DL (ref 11.7–15.7)
IMM GRANULOCYTES # BLD: 0.1 10E3/UL
IMM GRANULOCYTES NFR BLD: 1 %
INR PPP: 1.37 (ref 0.85–1.15)
LYMPHOCYTES # BLD AUTO: 3 10E3/UL (ref 0.8–5.3)
LYMPHOCYTES NFR BLD AUTO: 22 %
MCH RBC QN AUTO: 31.5 PG (ref 26.5–33)
MCHC RBC AUTO-ENTMCNC: 35.9 G/DL (ref 31.5–36.5)
MCV RBC AUTO: 88 FL (ref 78–100)
MONOCYTES # BLD AUTO: 1 10E3/UL (ref 0–1.3)
MONOCYTES NFR BLD AUTO: 7 %
NEUTROPHILS # BLD AUTO: 8.5 10E3/UL (ref 1.6–8.3)
NEUTROPHILS NFR BLD AUTO: 62 %
NRBC # BLD AUTO: 0.7 10E3/UL
NRBC BLD AUTO-RTO: 5 /100
PLATELET # BLD AUTO: 515 10E3/UL (ref 150–450)
POTASSIUM BLD-SCNC: 3.1 MMOL/L (ref 3.4–5.3)
RBC # BLD AUTO: 2.38 10E6/UL (ref 3.8–5.2)
RETICS # AUTO: 0.55 10E6/UL (ref 0.03–0.1)
RETICS/RBC NFR AUTO: 23.2 % (ref 0.5–2)
SODIUM SERPL-SCNC: 140 MMOL/L (ref 133–144)
WBC # BLD AUTO: 13.6 10E3/UL (ref 4–11)

## 2021-12-03 PROCEDURE — 99285 EMERGENCY DEPT VISIT HI MDM: CPT | Performed by: EMERGENCY MEDICINE

## 2021-12-03 PROCEDURE — 258N000003 HC RX IP 258 OP 636: Performed by: EMERGENCY MEDICINE

## 2021-12-03 PROCEDURE — 250N000013 HC RX MED GY IP 250 OP 250 PS 637: Performed by: EMERGENCY MEDICINE

## 2021-12-03 PROCEDURE — 85025 COMPLETE CBC W/AUTO DIFF WBC: CPT | Performed by: EMERGENCY MEDICINE

## 2021-12-03 PROCEDURE — 96375 TX/PRO/DX INJ NEW DRUG ADDON: CPT | Performed by: EMERGENCY MEDICINE

## 2021-12-03 PROCEDURE — 250N000013 HC RX MED GY IP 250 OP 250 PS 637: Performed by: FAMILY MEDICINE

## 2021-12-03 PROCEDURE — 85730 THROMBOPLASTIN TIME PARTIAL: CPT | Performed by: EMERGENCY MEDICINE

## 2021-12-03 PROCEDURE — 99285 EMERGENCY DEPT VISIT HI MDM: CPT | Mod: 25 | Performed by: EMERGENCY MEDICINE

## 2021-12-03 PROCEDURE — 96365 THER/PROPH/DIAG IV INF INIT: CPT | Performed by: EMERGENCY MEDICINE

## 2021-12-03 PROCEDURE — 80048 BASIC METABOLIC PNL TOTAL CA: CPT | Performed by: EMERGENCY MEDICINE

## 2021-12-03 PROCEDURE — 85045 AUTOMATED RETICULOCYTE COUNT: CPT | Performed by: EMERGENCY MEDICINE

## 2021-12-03 PROCEDURE — 96366 THER/PROPH/DIAG IV INF ADDON: CPT | Performed by: EMERGENCY MEDICINE

## 2021-12-03 PROCEDURE — 36415 COLL VENOUS BLD VENIPUNCTURE: CPT | Performed by: EMERGENCY MEDICINE

## 2021-12-03 PROCEDURE — 250N000011 HC RX IP 250 OP 636: Performed by: FAMILY MEDICINE

## 2021-12-03 PROCEDURE — 85610 PROTHROMBIN TIME: CPT | Performed by: EMERGENCY MEDICINE

## 2021-12-03 PROCEDURE — 250N000011 HC RX IP 250 OP 636: Performed by: EMERGENCY MEDICINE

## 2021-12-03 RX ORDER — HEPARIN SODIUM (PORCINE) LOCK FLUSH IV SOLN 100 UNIT/ML 100 UNIT/ML
100 SOLUTION INTRAVENOUS ONCE
Status: COMPLETED | OUTPATIENT
Start: 2021-12-03 | End: 2021-12-03

## 2021-12-03 RX ORDER — ONDANSETRON 2 MG/ML
8 INJECTION INTRAMUSCULAR; INTRAVENOUS ONCE
Status: COMPLETED | OUTPATIENT
Start: 2021-12-03 | End: 2021-12-03

## 2021-12-03 RX ORDER — POTASSIUM CHLORIDE 1.5 G/1.58G
40 POWDER, FOR SOLUTION ORAL ONCE
Status: COMPLETED | OUTPATIENT
Start: 2021-12-03 | End: 2021-12-03

## 2021-12-03 RX ORDER — MORPHINE SULFATE 2 MG/ML
2 INJECTION, SOLUTION INTRAMUSCULAR; INTRAVENOUS ONCE
Status: COMPLETED | OUTPATIENT
Start: 2021-12-03 | End: 2021-12-03

## 2021-12-03 RX ORDER — KETOROLAC TROMETHAMINE 15 MG/ML
15 INJECTION, SOLUTION INTRAMUSCULAR; INTRAVENOUS ONCE
Status: COMPLETED | OUTPATIENT
Start: 2021-12-03 | End: 2021-12-03

## 2021-12-03 RX ORDER — OXYCODONE HYDROCHLORIDE 10 MG/1
20 TABLET ORAL ONCE
Status: COMPLETED | OUTPATIENT
Start: 2021-12-03 | End: 2021-12-03

## 2021-12-03 RX ADMIN — POTASSIUM CHLORIDE 40 MEQ: 1.5 POWDER, FOR SOLUTION ORAL at 13:11

## 2021-12-03 RX ADMIN — OXYCODONE HYDROCHLORIDE 20 MG: 10 TABLET ORAL at 06:10

## 2021-12-03 RX ADMIN — DEXTROSE AND SODIUM CHLORIDE 1000 ML: 5; 450 INJECTION, SOLUTION INTRAVENOUS at 06:02

## 2021-12-03 RX ADMIN — Medication 100 UNITS: at 13:29

## 2021-12-03 RX ADMIN — MORPHINE SULFATE 2 MG: 2 INJECTION, SOLUTION INTRAMUSCULAR; INTRAVENOUS at 10:22

## 2021-12-03 RX ADMIN — ONDANSETRON 8 MG: 2 INJECTION INTRAMUSCULAR; INTRAVENOUS at 06:10

## 2021-12-03 RX ADMIN — MORPHINE SULFATE 2 MG: 2 INJECTION, SOLUTION INTRAMUSCULAR; INTRAVENOUS at 12:31

## 2021-12-03 RX ADMIN — KETOROLAC TROMETHAMINE 15 MG: 15 INJECTION, SOLUTION INTRAMUSCULAR; INTRAVENOUS at 06:10

## 2021-12-03 ASSESSMENT — MIFFLIN-ST. JEOR: SCORE: 1516.11

## 2021-12-03 NOTE — DISCHARGE INSTRUCTIONS
Home.  Continue home medications.  Your potassium was slightly low in the ER. You received some oral potassium also.  Hypokalemia discharge instructions.  See your hematologist for follow up.  Return if any concerns.

## 2021-12-03 NOTE — ED PROVIDER NOTES
ED Provider Note  [facility]      History     Chief Complaint   Patient presents with     Sickle Cell Pain Crisis     HPI  Jennifer Cervantes is a 21 year old female with history of sickle cell disease and multiple ED evaluations as well as admissions, recently diagnosed pulmonary embolism (with underlying history of multiple PEs ) on Coumadin, history of cva presenting with lower back pain upper and lower extremity joint pain, hip pain consistent with prior sickle cell flares.    Pain began yesterday.  Patient was unable to make it to infusion center.  Slowly worsened at midnight until the patient came for ED care and evaluation.    Denies fevers or chills no current chest pain and no current shortness of breath.  No abdominal pain no vomiting or diarrhea.  No rashes or recent trauma.  Has been compliant with her  Sickle cell regimen and pain regimen.    Past Medical History  Past Medical History:   Diagnosis Date     Anxiety      Bleeding disorder (H)      Blood clotting disorder (H)      Cerebral infarction (H) 2015     Cognitive developmental delay     low IQ. 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 (H)     right hand contractures     Iron overload due to repeated red blood cell transfusions      Migraines      Multiple subsegmental pulmonary emboli without acute cor pulmonale (H) 02/01/2021     Oppositional defiant behavior      Superficial venous thrombosis of arm, right 03/25/2021     Uncomplicated asthma      Past Surgical History:   Procedure Laterality Date     AS INSERT TUNNELED CV 2 CATH W/O PORT/PUMP       C BREAST REDUCTION (INCLUDES LIPO) TIER 3 Bilateral 04/23/2019     CHOLECYSTECTOMY       CV RIGHT HEART CATH MEASUREMENTS RECORDED N/A 11/18/2021    Procedure: Right Heart Cath;  Surgeon: Jackson Stauffer MD;  Location:  HEART CARDIAC CATH  LAB     INSERT PORT VASCULAR ACCESS Left 4/21/2021    Procedure: INSERTION, VASCULAR ACCESS PORT (NOT SURE ON SIDE UNTIL REMOVAL);  Surgeon: Rajan Moer MD;  Location: [facility] OR     IR CHEST PORT PLACEMENT > 5 YRS OF AGE  4/21/2021     IR CVC NON TUNNEL LINE REMOVAL  5/6/2021     IR CVC NON TUNNEL PLACEMENT  04/07/2020     IR CVC NON TUNNEL PLACEMENT  4/30/2021     IR PORT REMOVAL LEFT  4/21/2021     REMOVE PORT VASCULAR ACCESS Left 4/21/2021    Procedure: REMOVAL, VASCULAR ACCESS PORT LEFT;  Surgeon: Rajan More MD;  Location: [facility] OR     REPAIR TENDON ELBOW Right 10/02/2019    Procedure: Right Elbow Flexor Lengthening, Flexor Pronator Slide Of Wrist and Finger, Thumb Adductor Lengthening;  Surgeon: Anai Franco MD;  [facility]: [facility]     TONSILLECTOMY Bilateral 10/02/2019    Procedure: Bilateral Tonsillectomy;  Surgeon: Farhana Guy MD;  [facility]: [facility]     acetaminophen (TYLENOL) 325 MG tablet  albuterol (PROAIR HFA/PROVENTIL HFA/VENTOLIN HFA) 108 (90 Base) MCG/ACT inhaler  albuterol (PROVENTIL) (2.5 MG/3ML) 0.083% neb solution  ARIPiprazole (ABILIFY) 2 MG tablet  budesonide-formoterol (SYMBICORT) 160-4.5 MCG/ACT Inhaler  deferasirox (JADENU) 360 MG tablet  diphenhydrAMINE (BENADRYL) 25 MG capsule  EPINEPHrine (ANY BX GENERIC EQUIV) 0.3 MG/0.3ML injection 2-pack  Hydroxyurea 1000 MG TABS  hydrOXYzine (ATARAX) 25 MG tablet  lidocaine-prilocaine (EMLA) 2.5-2.5 % external cream  medroxyPROGESTERone (DEPO-PROVERA) 150 MG/ML IM injection  morphine (MS CONTIN) 15 MG CR tablet  naloxone (NARCAN) 4 MG/0.1ML nasal spray  omeprazole (PRILOSEC) 20 MG DR capsule  ondansetron (ZOFRAN) 8 MG tablet  oxyCODONE IR (ROXICODONE) 15 MG tablet  sertraline (ZOLOFT) 100 MG tablet  warfarin ANTICOAGULANT (COUMADIN) 5 MG tablet  warfarin ANTICOAGULANT (COUMADIN) 7.5 MG tablet      Allergies   Allergen Reactions     Contrast Dye      Hives and breathing issues     Fish-Derived Products Hives     Seafood Hives      "Diagnostic X-Ray Materials      Gadolinium      Family History  Family History   Problem Relation Age of Onset     Sickle Cell Trait Mother      Hypertension Mother      Asthma Mother      Sickle Cell Trait Father      Social History   Social History     Tobacco Use     Smoking status: Never Smoker     Smokeless tobacco: Never Used   Substance Use Topics     Alcohol use: Not Currently     Alcohol/week: 0.0 standard drinks     Drug use: Never      Past medical history, past surgical history, medications, allergies, family history, and social history were reviewed with the patient. No additional pertinent items.       Review of Systems  A complete review of systems was performed with pertinent positives and negatives noted in the HPI, and all other systems negative.    Physical Exam   BP: 135/83  Pulse: 100  Resp: 16  Height: 162.6 cm (5' 4\")  Weight: 77.1 kg (170 lb)  SpO2: 93 %  Physical Exam  GEN: Uncomfortable appearing, nontoxic cooperative and conversant.   HEENT: The head is normocephalic and atraumatic. Pupils are equal round and reactive to light. Extraocular motions are intact. There is no facial swelling. The neck is nontender and supple.   CV: Regular rate and rhythm   PULM: Clear to auscultation bilaterally.  Unlabored breathing.  ABD: Soft, nontender, nondistended.   EXT: Full range of motion.      PSYCH: Calm and cooperative, interactive.     ED Course      Procedures                     Results for orders placed or performed during the hospital encounter of 12/03/21   CBC with platelets differential     Status: None ()    Narrative    The following orders were created for panel order CBC with platelets differential.  Procedure                               Abnormality         Status                     ---------                               -----------         ------                     CBC with platelets and d...[[id number]]                                                   Please view results for " these tests on the individual orders.     Medications   dextrose 5% and 0.45% NaCl BOLUS (1,000 mLs Intravenous New Bag 12/3/21 0602)   ondansetron (ZOFRAN) injection 8 mg (8 mg Intravenous Given 12/3/21 0610)   oxyCODONE IR (ROXICODONE) tablet 20 mg (20 mg Oral Given 12/3/21 0610)   ketorolac (TORADOL) injection 15 mg (15 mg Intravenous Given 12/3/21 0610)        Assessments & Plan (with Medical Decision Making)   21-year-old female with sickle cell disease presenting with low back pain and joint pain as described  DDx includes sickle cell flare, aplastic anemia, acute chest, pneumonia, among other causes  No evidence for acute chest on history or exam.  Currently anticoagulated for pulmonary embolism.     Patient initiated on her sickle cell crisis regimen as per care plan    Labs sent and pending  Reticulocyte count appropriately elevated, argue against aplastic crisis.  Otherwise labs unrevealing with INR subtherapeutic at 1.37  Discussed that whether admitted or discharge she will need to follow-up with the Coumadin clinic.  Patient understands    Pt signed out to the morning attending for further follow-up following her analgesia protocol and reevaluation.        I have reviewed the nursing notes. I have reviewed the findings, diagnosis, plan and need for follow up with the patient.    New Prescriptions    No medications on file       Final diagnoses:   Sickle cell pain crisis (H)       --  Deo Schmid   [facility] EMERGENCY DEPARTMENT  12/3/2021     Deo Schmid MD  12/05/21 0624

## 2021-12-03 NOTE — PROGRESS NOTES
ANTICOAGULATION MANAGEMENT     Jennifer Cervantes 21 year old female is on warfarin with subtherapeutic INR result. (Goal INR 2.0-3.0)    Recent labs: (last 7 days)     12/03/21  0619   INR 1.37*       ASSESSMENT     Source(s): Chart Review       Warfarin doses taken: Reviewed in chart    Diet: No new diet changes identified    New illness, injury, or hospitalization: Yes: ED again today for sickle cell pain crisis and hypokalemia. Multiple ED visit this past week for sickle cell pain crisis.     Medication/supplement changes: Received Ketorolac in ED -Concurrent use of ANTICOAGULANTS and NSAIDS may result in increased risk of bleeding.    Signs or symptoms of bleeding or clotting: none noted at ED visit    Previous INR: Subtherapeutic    Additional findings: None     PLAN     Recommended plan for ongoing change(s) affecting INR     Dosing Instructions:  Decrease your warfarin dose (17.4% change) with next INR in 5-7 days       Summary  As of 12/3/2021    Full warfarin instructions:  7.5 mg every Wed; 10 mg all other days   Next INR check:  12/8/2021             Detailed voice message left for Jennifer with dosing instructions and follow up date.     Contact [phone] to schedule and with any changes, questions or concerns.     Education provided: Goal range and significance of current result, Importance of notifying clinic for changes in medications; a sooner lab recheck maybe needed., Importance of notifying clinic for diarrhea, nausea/vomiting, reduced intake, and/or illness; a sooner lab recheck maybe needed., Importance of notifying clinic of upcoming surgeries and procedures 2 weeks in advance and Contact [phone] with any changes, questions or concerns.     Plan made per [facility] anticoagulation protocol    PRINCESS MORENO RN  Anticoagulation Clinic  12/3/2021    _______________________________________________________________________     Anticoagulation Episode Summary     Current INR goal:  2.0-3.0   TTR:  0.0 % (2  d)   Target end date:  Indefinite   Send INR reminders to:  [facility]    Indications    Chronic pulmonary embolism without acute cor pulmonale  unspecified pulmonary embolism type (H) [I27.82]           Comments:  [facility] center [phone]         Anticoagulation Care Providers     Provider Role Specialty Phone number    Eric Duncan MD Referring Pediatric Hematology-Oncology [phone]

## 2021-12-03 NOTE — ED PROVIDER NOTES
"Patient seen by Dr. Jacinto for sickle cell crisis symptoms.  Patient had IV established labs drawn etc.  Patient ER given initial oral medicines still having headache was given 2 doses of 2 mg of morphine IV x2.  Patient also potassium 3.1 given 40 mEq of K-Isabelle orally.  Patient feeling better at this point comfortable going home follow-up with her hematologist and will return if any concerns at all discharge instructions were given also.Patient sx are low back without chest pain.    /83   Pulse 100   Resp 16   Ht 1.626 m (5' 4\")   Wt 77.1 kg (170 lb)   SpO2 93%   BMI 29.18 kg/m        This note was created at least in part by the use of dragon voice dictation system. Inadvertent typographical errors may still exist.  Pedro Ryan MD.    Patient evaluated in the emergency department during the COVID-19 pandemic period. Careful attention to patients safety was addressed throughout the evaluation. Evaluation and treatment management was initiated with disposition made efficiently and appropriate as possible to minimize any risk of potential exposure to patient during this evaluation.       Pedro Ryan MD  12/03/21 1315    "

## 2021-12-05 ENCOUNTER — APPOINTMENT (OUTPATIENT)
Dept: ULTRASOUND IMAGING | Facility: CLINIC | Age: 21
End: 2021-12-05
Attending: EMERGENCY MEDICINE
Payer: COMMERCIAL

## 2021-12-05 ENCOUNTER — HOSPITAL ENCOUNTER (EMERGENCY)
Facility: CLINIC | Age: 21
Discharge: HOME | End: 2021-12-05
Attending: EMERGENCY MEDICINE | Admitting: EMERGENCY MEDICINE
Payer: COMMERCIAL

## 2021-12-05 VITALS
TEMPERATURE: 98.9 F | RESPIRATION RATE: 13 BRPM | DIASTOLIC BLOOD PRESSURE: 60 MMHG | HEART RATE: 92 BPM | SYSTOLIC BLOOD PRESSURE: 121 MMHG | OXYGEN SATURATION: 93 %

## 2021-12-05 DIAGNOSIS — D57.00 SICKLE CELL PAIN CRISIS (H): ICD-10-CM

## 2021-12-05 LAB
ALBUMIN SERPL-MCNC: 3.5 G/DL (ref 3.4–5)
ALP SERPL-CCNC: 63 U/L (ref 40–150)
ALT SERPL W P-5'-P-CCNC: 58 U/L (ref 0–50)
ANION GAP SERPL CALCULATED.3IONS-SCNC: 6 MMOL/L (ref 3–14)
AST SERPL W P-5'-P-CCNC: 60 U/L (ref 0–45)
BASOPHILS # BLD MANUAL: 0.4 10E3/UL (ref 0–0.2)
BASOPHILS NFR BLD MANUAL: 3 %
BILIRUB SERPL-MCNC: 3 MG/DL (ref 0.2–1.3)
BUN SERPL-MCNC: 8 MG/DL (ref 7–30)
CALCIUM SERPL-MCNC: 8.4 MG/DL (ref 8.5–10.1)
CHLORIDE BLD-SCNC: 116 MMOL/L (ref 94–109)
CO2 SERPL-SCNC: 21 MMOL/L (ref 20–32)
CREAT SERPL-MCNC: 0.51 MG/DL (ref 0.52–1.04)
ELLIPTOCYTES BLD QL SMEAR: ABNORMAL
EOSINOPHIL # BLD MANUAL: 0.5 10E3/UL (ref 0–0.7)
EOSINOPHIL NFR BLD MANUAL: 4 %
ERYTHROCYTE [DISTWIDTH] IN BLOOD BY AUTOMATED COUNT: 25.2 % (ref 10–15)
GFR SERPL CREATININE-BSD FRML MDRD: >90 ML/MIN/1.73M2
GLUCOSE BLD-MCNC: 105 MG/DL (ref 70–99)
HCT VFR BLD AUTO: 22.1 % (ref 35–47)
HGB BLD-MCNC: 7.9 G/DL (ref 11.7–15.7)
LYMPHOCYTES # BLD MANUAL: 3.8 10E3/UL (ref 0.8–5.3)
LYMPHOCYTES NFR BLD MANUAL: 31 %
MCH RBC QN AUTO: 31.6 PG (ref 26.5–33)
MCHC RBC AUTO-ENTMCNC: 35.7 G/DL (ref 31.5–36.5)
MCV RBC AUTO: 88 FL (ref 78–100)
MONOCYTES # BLD MANUAL: 0.4 10E3/UL (ref 0–1.3)
MONOCYTES NFR BLD MANUAL: 3 %
NEUTROPHILS # BLD MANUAL: 7.1 10E3/UL (ref 1.6–8.3)
NEUTROPHILS NFR BLD MANUAL: 59 %
NRBC # BLD AUTO: 2.1 10E3/UL
NRBC BLD MANUAL-RTO: 17 %
PLAT MORPH BLD: ABNORMAL
PLATELET # BLD AUTO: 518 10E3/UL (ref 150–450)
POTASSIUM BLD-SCNC: 3.5 MMOL/L (ref 3.4–5.3)
PROT SERPL-MCNC: 7.3 G/DL (ref 6.8–8.8)
RBC # BLD AUTO: 2.5 10E6/UL (ref 3.8–5.2)
RBC MORPH BLD: ABNORMAL
RETICS # AUTO: 0.57 10E6/UL (ref 0.03–0.1)
RETICS/RBC NFR AUTO: 22.7 % (ref 0.5–2)
SICKLE CELLS BLD QL SMEAR: ABNORMAL
SODIUM SERPL-SCNC: 143 MMOL/L (ref 133–144)
TARGETS BLD QL SMEAR: ABNORMAL
WBC # BLD AUTO: 12.1 10E3/UL (ref 4–11)

## 2021-12-05 PROCEDURE — 85045 AUTOMATED RETICULOCYTE COUNT: CPT | Performed by: EMERGENCY MEDICINE

## 2021-12-05 PROCEDURE — 82040 ASSAY OF SERUM ALBUMIN: CPT | Performed by: EMERGENCY MEDICINE

## 2021-12-05 PROCEDURE — 93971 EXTREMITY STUDY: CPT | Mod: 26 | Performed by: RADIOLOGY

## 2021-12-05 PROCEDURE — 82247 BILIRUBIN TOTAL: CPT | Performed by: EMERGENCY MEDICINE

## 2021-12-05 PROCEDURE — 258N000003 HC RX IP 258 OP 636: Performed by: EMERGENCY MEDICINE

## 2021-12-05 PROCEDURE — 96376 TX/PRO/DX INJ SAME DRUG ADON: CPT | Performed by: EMERGENCY MEDICINE

## 2021-12-05 PROCEDURE — 96374 THER/PROPH/DIAG INJ IV PUSH: CPT | Performed by: EMERGENCY MEDICINE

## 2021-12-05 PROCEDURE — 99285 EMERGENCY DEPT VISIT HI MDM: CPT | Performed by: EMERGENCY MEDICINE

## 2021-12-05 PROCEDURE — 85027 COMPLETE CBC AUTOMATED: CPT | Performed by: EMERGENCY MEDICINE

## 2021-12-05 PROCEDURE — 36415 COLL VENOUS BLD VENIPUNCTURE: CPT | Performed by: EMERGENCY MEDICINE

## 2021-12-05 PROCEDURE — 99285 EMERGENCY DEPT VISIT HI MDM: CPT | Mod: 25 | Performed by: EMERGENCY MEDICINE

## 2021-12-05 PROCEDURE — 93971 EXTREMITY STUDY: CPT | Mod: LT

## 2021-12-05 PROCEDURE — 96375 TX/PRO/DX INJ NEW DRUG ADDON: CPT | Performed by: EMERGENCY MEDICINE

## 2021-12-05 PROCEDURE — 96361 HYDRATE IV INFUSION ADD-ON: CPT | Performed by: EMERGENCY MEDICINE

## 2021-12-05 PROCEDURE — 250N000011 HC RX IP 250 OP 636: Performed by: EMERGENCY MEDICINE

## 2021-12-05 RX ORDER — SODIUM CHLORIDE 9 MG/ML
INJECTION, SOLUTION INTRAVENOUS CONTINUOUS
Status: DISCONTINUED | OUTPATIENT
Start: 2021-12-05 | End: 2021-12-05 | Stop reason: HOSPADM

## 2021-12-05 RX ORDER — ONDANSETRON 2 MG/ML
8 INJECTION INTRAMUSCULAR; INTRAVENOUS
Status: COMPLETED | OUTPATIENT
Start: 2021-12-05 | End: 2021-12-05

## 2021-12-05 RX ORDER — KETOROLAC TROMETHAMINE 30 MG/ML
30 INJECTION, SOLUTION INTRAMUSCULAR; INTRAVENOUS ONCE
Status: COMPLETED | OUTPATIENT
Start: 2021-12-05 | End: 2021-12-05

## 2021-12-05 RX ORDER — MORPHINE SULFATE 4 MG/ML
2 INJECTION, SOLUTION INTRAMUSCULAR; INTRAVENOUS
Status: COMPLETED | OUTPATIENT
Start: 2021-12-05 | End: 2021-12-05

## 2021-12-05 RX ORDER — HEPARIN SODIUM (PORCINE) LOCK FLUSH IV SOLN 100 UNIT/ML 100 UNIT/ML
5 SOLUTION INTRAVENOUS ONCE
Status: COMPLETED | OUTPATIENT
Start: 2021-12-05 | End: 2021-12-05

## 2021-12-05 RX ADMIN — SODIUM CHLORIDE 1000 ML: 9 INJECTION, SOLUTION INTRAVENOUS at 06:14

## 2021-12-05 RX ADMIN — MORPHINE SULFATE 2 MG: 4 INJECTION INTRAVENOUS at 06:05

## 2021-12-05 RX ADMIN — KETOROLAC TROMETHAMINE 30 MG: 30 INJECTION, SOLUTION INTRAMUSCULAR; INTRAVENOUS at 06:05

## 2021-12-05 RX ADMIN — Medication 5 ML: at 11:24

## 2021-12-05 RX ADMIN — MORPHINE SULFATE 2 MG: 4 INJECTION INTRAVENOUS at 07:05

## 2021-12-05 RX ADMIN — MORPHINE SULFATE 2 MG: 4 INJECTION INTRAVENOUS at 08:33

## 2021-12-05 RX ADMIN — ONDANSETRON 8 MG: 2 INJECTION INTRAMUSCULAR; INTRAVENOUS at 06:05

## 2021-12-05 NOTE — DISCHARGE INSTRUCTIONS
Continue current medications.  Follow-up with hematology clinic as planned.    Return if any concerns.

## 2021-12-05 NOTE — ED NOTES
Emergency Department Patient Sign-out       Brief HPI:  This is a 21 year old female signed out to me by Dr. Chou .  See initial ED Provider note for details of the presentation.           Significant Events prior to my assuming care: Patient with a history of sickle cell disease to the emergency department with typical pain crisis.  She is due to receive her third dose of pain medication with plan for reassessment and likely discharge at that time.      Exam:   Patient Vitals for the past 24 hrs:   BP Temp Temp src Pulse Resp SpO2   12/05/21 1030 119/69 -- -- 84 21 93 %   12/05/21 1000 116/68 -- -- 79 17 93 %   12/05/21 0930 115/68 -- -- 84 18 94 %   12/05/21 0900 116/65 -- -- 88 19 93 %   12/05/21 0830 119/61 -- -- 84 20 --   12/05/21 0700 -- -- -- 83 29 99 %   12/05/21 0658 -- -- -- 86 28 100 %   12/05/21 0619 -- 98.9  F (37.2  C) Oral -- -- --   12/05/21 0618 122/74 -- -- 90 20 96 %   12/05/21 0548 (!) 140/86 -- -- 103 16 94 %           ED RESULTS:   Results for orders placed or performed during the hospital encounter of 12/05/21 (from the past 24 hour(s))   CBC with platelets differential     Status: Abnormal    Collection Time: 12/05/21  6:15 AM    Narrative    The following orders were created for panel order CBC with platelets differential.  Procedure                               Abnormality         Status                     ---------                               -----------         ------                     CBC with platelets and d...[[id number]]  Abnormal            Final result               Manual Differential[[id number]]          Abnormal            Final result                 Please view results for these tests on the individual orders.   Comprehensive metabolic panel     Status: Abnormal    Collection Time: 12/05/21  6:15 AM   Result Value Ref Range    Sodium 143 133 - 144 mmol/L    Potassium 3.5 3.4 - 5.3 mmol/L    Chloride 116 (H) 94 - 109 mmol/L    Carbon Dioxide (CO2) 21 20 - 32 mmol/L     Anion Gap 6 3 - 14 mmol/L    Urea Nitrogen 8 7 - 30 mg/dL    Creatinine 0.51 (L) 0.52 - 1.04 mg/dL    Calcium 8.4 (L) 8.5 - 10.1 mg/dL    Glucose 105 (H) 70 - 99 mg/dL    Alkaline Phosphatase 63 40 - 150 U/L    AST 60 (H) 0 - 45 U/L    ALT 58 (H) 0 - 50 U/L    Protein Total 7.3 6.8 - 8.8 g/dL    Albumin 3.5 3.4 - 5.0 g/dL    Bilirubin Total 3.0 (H) 0.2 - 1.3 mg/dL    GFR Estimate >90 >60 mL/min/1.73m2   Reticulocyte count     Status: Abnormal    Collection Time: 12/05/21  6:15 AM   Result Value Ref Range    % Reticulocyte 22.7 (H) 0.5 - 2.0 %    Absolute Reticulocyte 0.568 (H) 0.025 - 0.095 10e6/uL   CBC with platelets and differential     Status: Abnormal    Collection Time: 12/05/21  6:15 AM   Result Value Ref Range    WBC Count 12.1 (H) 4.0 - 11.0 10e3/uL    RBC Count 2.50 (L) 3.80 - 5.20 10e6/uL    Hemoglobin 7.9 (L) 11.7 - 15.7 g/dL    Hematocrit 22.1 (L) 35.0 - 47.0 %    MCV 88 78 - 100 fL    MCH 31.6 26.5 - 33.0 pg    MCHC 35.7 31.5 - 36.5 g/dL    RDW 25.2 (H) 10.0 - 15.0 %    Platelet Count 518 (H) 150 - 450 10e3/uL   Manual Differential     Status: Abnormal    Collection Time: 12/05/21  6:15 AM   Result Value Ref Range    % Neutrophils 59 %    % Lymphocytes 31 %    % Monocytes 3 %    % Eosinophils 4 %    % Basophils 3 %    NRBCs per 100 WBC 17 (H) <=0 %    Absolute Neutrophils 7.1 1.6 - 8.3 10e3/uL    Absolute Lymphocytes 3.8 0.8 - 5.3 10e3/uL    Absolute Monocytes 0.4 0.0 - 1.3 10e3/uL    Absolute Eosinophils 0.5 0.0 - 0.7 10e3/uL    Absolute Basophils 0.4 (H) 0.0 - 0.2 10e3/uL    Absolute NRBCs 2.1 (H) <=0.0 10e3/uL    RBC Morphology Confirmed RBC Indices     Platelet Assessment  Automated Count Confirmed. Platelet morphology is normal.     Automated Count Confirmed. Platelet morphology is normal.    Elliptocytes Moderate (A) None Seen    Sickle Cells Moderate (A) None Seen    Target Cells Marked (A) None Seen   US Lower Extremity Venous Duplex Left     Status: None    Collection Time: 12/05/21  6:56 AM     Narrative    EXAMINATION: US LOWER EXTREMITY VENOUS DUPLEX LEFT    INDICATION: Left lower extremity swelling. Question DVT.    COMPARISON: Ultrasound dated 9/4/2021.    TECHNIQUE: Gray-scale evaluation with compression, spectral flow and  color Doppler assessment of the deep venous system of left lower  extremity from groin to knee, and then at the ankles.    FINDINGS:  In the left lower extremity, the common femoral, femoral, popliteal  and posterior tibial veins demonstrate normal compressibility and  blood flow.      Impression    IMPRESSION:  No evidence of deep venous thrombosis in the left lower extremity.    I have personally reviewed the examination and initial interpretation  and I agree with the findings.    MARY JANE JUÁREZ MD         SYSTEM ID:  [id number]       ED MEDICATIONS:   Medications   0.9% sodium chloride BOLUS (0 mLs Intravenous Stopped 12/5/21 0834)     Followed by   sodium chloride 0.9% infusion ( Intravenous Not Given 12/5/21 0622)   ondansetron (ZOFRAN) injection 8 mg (8 mg Intravenous Given 12/5/21 0605)   ketorolac (TORADOL) injection 30 mg (30 mg Intravenous Given 12/5/21 0605)   morphine (PF) injection 2 mg (2 mg Intravenous Given 12/5/21 0833)     The patient's pain is well controlled after third dose of analgesics.  She is ready and agreeable with plan for discharge.    Impression:    ICD-10-CM    1. Sickle cell pain crisis (H)  D57.00        Plan:    Discharged home with plan for outpatient follow-up.      FRANCESCO WORLEY MD, Francesco Morales MD  12/06/21 1039

## 2021-12-05 NOTE — ED PROVIDER NOTES
ED Provider Note  [facility]      History     Chief Complaint   Patient presents with     Sickle Cell Pain Crisis     HPI  Jennifer Cervantes is a 21 year old female who has a past medical history of sickle cell disease, CVA, DVT on blood thinners presenting with pain.  She said the pain is in all of her extremities.  There is no chest pain, shortness of breath, fevers.  No new weakness, numbness or tingling.  Patient has been trying oral morphine and oxycodone at home without relief, she is not out of her pain meds.  No trauma.  Patient states that the cold is probably making her pain worse.  She also has a little bit of swelling in her left lower leg which she thinks is new and is concerned about blood clots.  She is taking her blood thinners.  Denies hemoptysis or cough.    Past Medical History  Past Medical History:   Diagnosis Date     Anxiety      Bleeding disorder (H)      Blood clotting disorder (H)      Cerebral infarction (H) 2015     Cognitive developmental delay     low IQ. 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 (H)     right hand contractures     Iron overload due to repeated red blood cell transfusions      Migraines      Multiple subsegmental pulmonary emboli without acute cor pulmonale (H) 02/01/2021     Oppositional defiant behavior      Superficial venous thrombosis of arm, right 03/25/2021     Uncomplicated asthma      Past Surgical History:   Procedure Laterality Date     AS INSERT TUNNELED CV 2 CATH W/O PORT/PUMP       C BREAST REDUCTION (INCLUDES LIPO) TIER 3 Bilateral 04/23/2019     CHOLECYSTECTOMY       CV RIGHT HEART CATH MEASUREMENTS RECORDED N/A 11/18/2021    Procedure: Right Heart Cath;  Surgeon: Jackson Stauffer MD;  Location:  HEART CARDIAC CATH LAB     INSERT PORT VASCULAR ACCESS Left 4/21/2021    Procedure: INSERTION,  VASCULAR ACCESS PORT (NOT SURE ON SIDE UNTIL REMOVAL);  Surgeon: Rajan More MD;  Location: [facility] OR     IR CHEST PORT PLACEMENT > 5 YRS OF AGE  4/21/2021     IR CVC NON TUNNEL LINE REMOVAL  5/6/2021     IR CVC NON TUNNEL PLACEMENT  04/07/2020     IR CVC NON TUNNEL PLACEMENT  4/30/2021     IR PORT REMOVAL LEFT  4/21/2021     REMOVE PORT VASCULAR ACCESS Left 4/21/2021    Procedure: REMOVAL, VASCULAR ACCESS PORT LEFT;  Surgeon: Rajan More MD;  Location: [facility] OR     REPAIR TENDON ELBOW Right 10/02/2019    Procedure: Right Elbow Flexor Lengthening, Flexor Pronator Slide Of Wrist and Finger, Thumb Adductor Lengthening;  Surgeon: Anai Franco MD;  [facility]: [facility] OR     TONSILLECTOMY Bilateral 10/02/2019    Procedure: Bilateral Tonsillectomy;  Surgeon: Farhana Guy MD;  Location: [facility] OR     acetaminophen (TYLENOL) 325 MG tablet  albuterol (PROAIR HFA/PROVENTIL HFA/VENTOLIN HFA) 108 (90 Base) MCG/ACT inhaler  albuterol (PROVENTIL) (2.5 MG/3ML) 0.083% neb solution  ARIPiprazole (ABILIFY) 2 MG tablet  budesonide-formoterol (SYMBICORT) 160-4.5 MCG/ACT Inhaler  deferasirox (JADENU) 360 MG tablet  diphenhydrAMINE (BENADRYL) 25 MG capsule  EPINEPHrine (ANY BX GENERIC EQUIV) 0.3 MG/0.3ML injection 2-pack  Hydroxyurea 1000 MG TABS  hydrOXYzine (ATARAX) 25 MG tablet  lidocaine-prilocaine (EMLA) 2.5-2.5 % external cream  medroxyPROGESTERone (DEPO-PROVERA) 150 MG/ML IM injection  morphine (MS CONTIN) 15 MG CR tablet  naloxone (NARCAN) 4 MG/0.1ML nasal spray  omeprazole (PRILOSEC) 20 MG DR capsule  ondansetron (ZOFRAN) 8 MG tablet  oxyCODONE IR (ROXICODONE) 15 MG tablet  sertraline (ZOLOFT) 100 MG tablet  warfarin ANTICOAGULANT (COUMADIN) 5 MG tablet  warfarin ANTICOAGULANT (COUMADIN) 7.5 MG tablet      Allergies   Allergen Reactions     Contrast Dye      Hives and breathing issues     Fish-Derived Products Hives     Seafood Hives     Diagnostic X-Ray Materials      Gadolinium      Family History  Family  History   Problem Relation Age of Onset     Sickle Cell Trait Mother      Hypertension Mother      Asthma Mother      Sickle Cell Trait Father      Social History   Social History     Tobacco Use     Smoking status: Never Smoker     Smokeless tobacco: Never Used   Substance Use Topics     Alcohol use: Not Currently     Alcohol/week: 0.0 standard drinks     Drug use: Never      Past medical history, past surgical history, medications, allergies, family history, and social history were reviewed with the patient. No additional pertinent items.       Review of Systems  A complete review of systems was performed with pertinent positives and negatives noted in the HPI, and all other systems negative.    Physical Exam   BP: (!) 140/86  Pulse: 103  Resp: 16  SpO2: 94 %  Physical Exam  Physical Exam   Constitutional: oriented to person, place, and time. appears well-developed and well-nourished.   HENT:   Head: Normocephalic and atraumatic.   Neck: Normal range of motion.   Pulmonary/Chest: Effort normal. No respiratory distress.   Cardiac: No murmurs, rubs, gallops. RRR.  Abdominal: Abdomen soft, nontender, nondistended. No rebound tenderness.  MSK: Mild left lower extremity swelling compared to the right without pitting features, no erythema or tenderness of the joint.  No signs of trauma.  Neurological: alert and oriented to person, place, and time.   Skin: Skin is warm and dry.   Psychiatric:  normal mood and affect.  behavior is normal. Thought content normal.     ED Course      Procedures                No results found for any visits on 12/05/21.  Medications   0.9% sodium chloride BOLUS (has no administration in time range)     Followed by   sodium chloride 0.9% infusion (has no administration in time range)   ondansetron (ZOFRAN) injection 8 mg (has no administration in time range)   ketorolac (TORADOL) injection 30 mg (has no administration in time range)   morphine (PF) injection 2 mg (has no administration in  time range)        Assessments & Plan (with Medical Decision Making)   MDM  Patient presenting with sickle cell pain crisis.  She does not have symptoms of acute chest at this point, will defer chest x-ray for this.  Labs drawn.  Patient states that the oral oxycodone never works, will start with IV medications as she has been having oral oxycodone at home without relief.  Ultrasound to be done to assess for DVT of the left lower extremity, otherwise may be underlying small hematoma or seroma.  No signs of infection over this area.  Patient will be signed out to the oncoming provider pending pain control.    I have reviewed the nursing notes. I have reviewed the findings, diagnosis, plan and need for follow up with the patient.    New Prescriptions    No medications on file       Final diagnoses:   Sickle cell pain crisis (H)       --  Andrew Chou  [facility] EMERGENCY DEPARTMENT  12/5/2021     Andrew Chou MD  12/05/21 0551

## 2021-12-05 NOTE — ED TRIAGE NOTES
Pt c/o 10/10 generalized pain secondary to sickle cell crisis. Denies any other symptoms at this time.

## 2021-12-06 ENCOUNTER — VIRTUAL VISIT (OUTPATIENT)
Dept: INTERNAL MEDICINE | Facility: CLINIC | Age: 21
End: 2021-12-06
Payer: COMMERCIAL

## 2021-12-06 ENCOUNTER — DOCUMENTATION ONLY (OUTPATIENT)
Dept: ANTICOAGULATION | Facility: CLINIC | Age: 21
End: 2021-12-06

## 2021-12-06 ENCOUNTER — TELEPHONE (OUTPATIENT)
Dept: ONCOLOGY | Facility: CLINIC | Age: 21
End: 2021-12-06

## 2021-12-06 DIAGNOSIS — D57.00 SICKLE CELL CRISIS (H): Primary | ICD-10-CM

## 2021-12-06 PROCEDURE — 99207 PR NO BILLABLE SERVICE THIS VISIT: CPT | Performed by: PEDIATRICS

## 2021-12-06 NOTE — NURSING NOTE
Chief Complaint   Patient presents with     Hospital F/U     Patient calls in for a hospital follow up.         Clyde Ramos MA on 12/6/2021 at 9:27 AM

## 2021-12-06 NOTE — CONFIDENTIAL NOTE
3:14pm unable to get in for IVF/pain today.   Call placed to Jennifer to let her know that we were unable to get her in for an infusion today and that she should call back in tomorrow if she is still having pain.

## 2021-12-06 NOTE — PROGRESS NOTES
ANTICOAGULATION  MANAGEMENT: Discharge Review    Jennifer Cervantes chart reviewed for anticoagulation continuity of care    Emergency room visit on 12/5/21 for sickle cell crisis pain, LLE swelling.    Discharge disposition: Home    Results:    Recent labs: (last 7 days)     12/03/21  0619   INR 1.37*     Anticoagulation inpatient management:     not applicable     Anticoagulation discharge instructions:     Warfarin dosing: home regimen continued   Bridging: No   INR goal change: No      Medication changes affecting anticoagulation: Yes: received Ketorolac in ED-Concurrent use of ANTICOAGULANTS and NSAIDS may result in increased risk of bleeding.    Additional factors affecting anticoagulation: Yes: patient presented to ED with LLE swelling-US performed-per results review-No evidence of deep venous thrombosis in the left lower extremity. Ongoing sickle cell pain.    Plan     No adjustment to anticoagulation plan needed  Next INR check in two days 12/8/21    Patient not contacted    No adjustment to Anticoagulation Calendar was required    PRINCESS MORENO RN

## 2021-12-06 NOTE — CONFIDENTIAL NOTE
"Pt called in to triage requesting IVF pain meds for all over body  pain rated 10/10 x 2-3 days. Stated last took prn Ms contin and oxycodone this morning without relief. Denied any fevers, chills, cough, sob, chest pain, numbness or tingling or other symptoms not typical of sickle cell pain. Pt's last infusion was 12/5/21 was in the ED, last clinic visit 11/30/21 with follow-up on 12/20/21 with Dr Duncan.   Patient states they do not have own ride and it will take 60 minutes long to get to cancer clinic.   Pt denied being out of home medications and needing any refills today.   8:16 paged Andrei HIGGINS  Please note, if you are late for your appt, you risk losing your infusion appt as it may delay another patient's infusion who arrived on time\".   "

## 2021-12-06 NOTE — PROGRESS NOTES
"Dear patient. Thank you for visiting with me. I want you to feel respected, understood, and empowered. \"Respect\" is valuing you as much as I would a close family member. \"Empowerment\" happens when you are fully informed, and can make the best possible decision for you.  Please ask me questions!  Challenge anything that is not clear.    Medical records are primarily used as memory aids for me and my colleagues. Things to know about my documentation style:  - The 'problem list' includes current symptoms or diagnoses, and some problems that are resolved but may return. I use the past medical history for problems not expected to return.  - I use single quotation marks for things that you or I said, when I want to clarify who was speaking.  - I use double quotation marks when copying a term from elsewhere in your records. Italics (besides here) may also denote a quotation.  If you have questions or concerns, please contact me; I will reply as soon as time allows.      Virtual Visit Details    Type of service:  Video Visit    Start Time: 9:54 AM    End Time:10:02 AM    Originating Location (pt. Location): Other not able to connect, while on way to heme    Distant Location (provider location):  [facility]     Platform used for Visit: n/a    PCP: Suraj Case  Visit type: problem-oriented  Time note: The total time (on the date of service) for this service was zero face to face minutes, including discussion/face-to-face, chart review, interpretation not otherwise reported, documentation, and updating of the computerized record.        Context    Jennifer Cervantes is a 21 year old woman, with concerns including:  Chief Complaint   Patient presents with     Hospital F/U     Patient calls in for a hospital follow up.       History, update, and/or problems      Jennifer has continued to have ER visits for sickle/pain crisis, most recently yesterday morning (discharged by 1135). She " appears to have been treated with ketorolac 30 mg, then fluids and 3 doses of 2 mg morphine. She had a left leg US without DVT.    It looks like she is on her way to [facility] for IVF.   I will put in a 'no service' rather than describe this as a no-show. I was running late to connect.    From my perspective, it is unfortunate that she is having to the ED so often, but she is seeking appropriate care (IV treatment for sickle pain crisis) and there are limited facilities for this (other than the ED) especially during the pandemic. I have managed many sickle cell patients in the past, at facilities with better access to IV treatments. Unfortunately my current primary care clinic is more about chronic care coordination rather than urgent treatment. Jennifer knows I am deferring to hematology on this.    She has come to us for depo shots. This can continue, with appropriate pregnancy testing, etc.        Outstanding issues (Dr. Case)  --------------------------------------------  -- discuss with hematologist  -- allergies  -- ASCUS   -- Housing

## 2021-12-07 ENCOUNTER — INFUSION THERAPY VISIT (OUTPATIENT)
Dept: TRANSPLANT | Facility: CLINIC | Age: 21
End: 2021-12-07
Attending: PEDIATRICS
Payer: COMMERCIAL

## 2021-12-07 ENCOUNTER — TELEPHONE (OUTPATIENT)
Dept: ONCOLOGY | Facility: CLINIC | Age: 21
End: 2021-12-07
Payer: COMMERCIAL

## 2021-12-07 ENCOUNTER — NURSE TRIAGE (OUTPATIENT)
Dept: NURSING | Facility: CLINIC | Age: 21
End: 2021-12-07
Payer: COMMERCIAL

## 2021-12-07 ENCOUNTER — PATIENT OUTREACH (OUTPATIENT)
Dept: CARE COORDINATION | Facility: CLINIC | Age: 21
End: 2021-12-07
Payer: COMMERCIAL

## 2021-12-07 VITALS
TEMPERATURE: 97.8 F | OXYGEN SATURATION: 93 % | RESPIRATION RATE: 16 BRPM | DIASTOLIC BLOOD PRESSURE: 83 MMHG | SYSTOLIC BLOOD PRESSURE: 139 MMHG | HEART RATE: 112 BPM

## 2021-12-07 DIAGNOSIS — D57.00 SICKLE CELL PAIN CRISIS (H): ICD-10-CM

## 2021-12-07 DIAGNOSIS — G81.10 SPASTIC HEMIPLEGIA, UNSPECIFIED ETIOLOGY, UNSPECIFIED LATERALITY (H): Primary | ICD-10-CM

## 2021-12-07 PROCEDURE — 96366 THER/PROPH/DIAG IV INF ADDON: CPT

## 2021-12-07 PROCEDURE — 999N000130 HC STATISTIC PORT-A-CATH ACCESS/FLUSHING

## 2021-12-07 PROCEDURE — 96365 THER/PROPH/DIAG IV INF INIT: CPT

## 2021-12-07 PROCEDURE — 96375 TX/PRO/DX INJ NEW DRUG ADDON: CPT

## 2021-12-07 PROCEDURE — 258N000003 HC RX IP 258 OP 636: Performed by: PEDIATRICS

## 2021-12-07 PROCEDURE — 250N000011 HC RX IP 250 OP 636: Performed by: PEDIATRICS

## 2021-12-07 RX ORDER — MORPHINE SULFATE 2 MG/ML
2 INJECTION, SOLUTION INTRAMUSCULAR; INTRAVENOUS
Status: CANCELLED
Start: 2022-01-01

## 2021-12-07 RX ORDER — MORPHINE SULFATE 2 MG/ML
2 INJECTION, SOLUTION INTRAMUSCULAR; INTRAVENOUS
Status: COMPLETED | OUTPATIENT
Start: 2021-12-07 | End: 2021-12-07

## 2021-12-07 RX ORDER — ONDANSETRON 8 MG/1
8 TABLET, FILM COATED ORAL
Status: CANCELLED
Start: 2022-01-01

## 2021-12-07 RX ORDER — HEPARIN SODIUM (PORCINE) LOCK FLUSH IV SOLN 100 UNIT/ML 100 UNIT/ML
5 SOLUTION INTRAVENOUS
Status: CANCELLED | OUTPATIENT
Start: 2022-01-01

## 2021-12-07 RX ORDER — DIPHENHYDRAMINE HCL 25 MG
25 CAPSULE ORAL
Status: CANCELLED
Start: 2022-01-01

## 2021-12-07 RX ORDER — HEPARIN SODIUM,PORCINE 10 UNIT/ML
5 VIAL (ML) INTRAVENOUS
Status: CANCELLED | OUTPATIENT
Start: 2022-01-01

## 2021-12-07 RX ADMIN — MORPHINE SULFATE 2 MG: 2 INJECTION, SOLUTION INTRAMUSCULAR; INTRAVENOUS at 16:00

## 2021-12-07 RX ADMIN — MORPHINE SULFATE 2 MG: 2 INJECTION, SOLUTION INTRAMUSCULAR; INTRAVENOUS at 14:02

## 2021-12-07 RX ADMIN — DEXTROSE AND SODIUM CHLORIDE 500 ML: 5; 450 INJECTION, SOLUTION INTRAVENOUS at 14:02

## 2021-12-07 RX ADMIN — MORPHINE SULFATE 2 MG: 2 INJECTION, SOLUTION INTRAMUSCULAR; INTRAVENOUS at 15:01

## 2021-12-07 ASSESSMENT — PAIN SCALES - GENERAL: PAINLEVEL: WORST PAIN (10)

## 2021-12-07 NOTE — CONFIDENTIAL NOTE
Pt called in to triage requesting IVF pain meds for all over body  pain rated 10/10 x 3-4days. Stated last took prn Ms contin and oxycodone, muscle relaxer this morning without relief. Denied any fevers, chills, cough, sob, chest pain, numbness or tingling or other symptoms not typical of sickle cell pain. Pt's last infusion was 12/5/21 was in the ED, last clinic visit 11/30/21 with follow-up on 12/20/21 with Dr Duncan.   Patient states they do not have own ride and it will take 60 minutes long to get to cancer clinic.   Pt denied being out of home medications and needing any refills today.   Patient Ok'ed for infusion on 12/6/21 by Andrei HIGGINS but did not get in for an infusion. Will place on list for an infusion today 12/7/21.     9:30 availability with BMT     Jennifer will take the 9:30 slot.     Message sent to  Abdullahi Elliott to assist with ride for a 9:15 arrival for a 9:30 IVF/Pain.    Message sent to  to schedule appointment.

## 2021-12-07 NOTE — CONFIDENTIAL NOTE
Due to ride situation unable to get to the [facility] prior to 11:00 am.     Per BMT the time for her infusion will need to be changed to 1:30 this afternoon.     Call placed to Jennifer and updated her that her new infusion time will be at 1:30 pm     Social workers are working on setting up ride for 1:30 arrival.     Message sent to  to change appointment time to 1:30 pm.

## 2021-12-07 NOTE — PROGRESS NOTES
Social Work Note: Telephone Call  Oncology Clinic     Data/Intervention:  Patient Name:  Jennifer Cervantes  /Age: 2000     Call From: [facility]        Reason for Call:  Transportation     Assessment:   called [employer] to arrange ride. [employer] arranged  for patient from home at 12:50 pm.  Patient will need to call when ready for return ride home [phone]. Patient reported that her family recently moved into a new home and has been there for about one week (reflected correctly in Epic). Patient reported liking their new home and reported that things have been going well with their family. Patient stated that they will talk and be around their family and when they need to be alone they will go to their room. Patient is still in the process of finding their own place, but reported that they are taking things slow and does not want to rush the process. SW encouraged patient to reach out for additional supports.      Plan:  Patient is aware of the transportation plan.  available to assist with any other needs.      CARLOS Chavez,[facility]  Hematology/Oncology Social Worker  Phone:[phone] Pager: [phone]

## 2021-12-07 NOTE — PROGRESS NOTES
Infusion Nursing Note:  Jennifer Cervantes presents today for add-on infusion (IV fluids and pain medication).    Patient seen by provider today: No   present during visit today: Not Applicable.    Note: No labs ordered for today.      Intravenous Access:  Implanted Port.    Treatment Conditions:  Patient received 500 md D51/2NS over two hours.  She received three doses of 2 mg IV push Morphine, each spaced one hour apart.      Post Infusion Assessment:  Patient tolerated infusion without incident. Patient reported partial relief from her sickle cell pain.  See flowsheet for details.      Discharge Plan:   Patient discharged in stable condition accompanied by: self.  Patient has scheduled transportation home.      ELINA WINTER RN

## 2021-12-07 NOTE — NURSING NOTE
"Oncology Rooming Note    December 7, 2021 1:42 PM   Jennifer Cervantes is a 21 year old female who presents for:    Chief Complaint   Patient presents with     Infusion     add-on infusion for sickle cell crisis     Initial Vitals: /83   Pulse 112   Temp 97.8  F (36.6  C) (Oral)   Resp 16   SpO2 93%  Estimated body mass index is 29.18 kg/m  as calculated from the following:    Height as of 12/3/21: 1.626 m (5' 4\").    Weight as of 12/3/21: 77.1 kg (170 lb). There is no height or weight on file to calculate BSA.  Worst Pain (10) Comment: Data Unavailable   No LMP recorded. Patient has had an injection.  Allergies reviewed: Yes  Medications reviewed: Yes    Medications: Medication refills not needed today.  Pharmacy name entered into [facility]:    [facility] - [place], [place] - [address] [facility] - [place], [place] - [address] [facility] [phone]    Clinical concerns: Patient denies fevers/N/V/D/respiratory symptoms.  She reports 10/10 sickle pain - she said it was generalized body pains.      ELINA WINTER RN              "

## 2021-12-07 NOTE — TELEPHONE ENCOUNTER
Patient calling to speak to someone at the [facility].    Reny Alba, RN  [facility] Triage/refill nurse    Additional Information    Information only question and nurse able to answer    Protocols used: INFORMATION ONLY CALL - NO TRIAGE-A-[place]

## 2021-12-07 NOTE — LETTER
12/7/2021         RE: Jennifer Cervantes  [address]  [place] [address]        Dear Colleague,    Thank you for referring your patient, Jennifer Cervantes, to the [facility] [place]. Please see a copy of my visit note below.    Infusion Nursing Note:  Jennifer Cervantes presents today for add-on infusion (IV fluids and pain medication).    Patient seen by provider today: No   present during visit today: Not Applicable.    Note: No labs ordered for today.      Intravenous Access:  Implanted Port.    Treatment Conditions:  Patient received 500 md D51/2NS over two hours.  She received three doses of 2 mg IV push Morphine, each spaced one hour apart.      Post Infusion Assessment:  Patient tolerated infusion without incident. Patient reported partial relief from her sickle cell pain.  See flowsheet for details.      Discharge Plan:   Patient discharged in stable condition accompanied by: self.  Patient has scheduled transportation home.      ELINA WINTER, JOE                          Again, thank you for allowing me to participate in the care of your patient.        Sincerely,        [facility]

## 2021-12-08 ENCOUNTER — TELEPHONE (OUTPATIENT)
Dept: ANTICOAGULATION | Facility: CLINIC | Age: 21
End: 2021-12-08
Payer: COMMERCIAL

## 2021-12-08 ENCOUNTER — NURSE TRIAGE (OUTPATIENT)
Dept: ONCOLOGY | Facility: CLINIC | Age: 21
End: 2021-12-08
Payer: COMMERCIAL

## 2021-12-08 NOTE — TELEPHONE ENCOUNTER
ANTICOAGULATION     Jennifer Cervantes is overdue for INR check.      Left message for patient to call and schedule lab appointment as soon as possible. If returning call, please schedule.     Gita Khan RN

## 2021-12-08 NOTE — TELEPHONE ENCOUNTER
[facility] Triage    Incoming Call: Sickle Cell    Pt called in to triage requesting IVF pain meds for lower back pain rated 9/10 less than 24 hours. Stated last took MS contin, oxy this morning without relief. Denied any fevers, chills, cough, sob, chest pain or other symptoms.  No numbess or tinglng in arms legs. Pt's last infusion was yesterday, last clinic visit 11/30 JAS Machado with follow-up on 12/20 with Perla. Patient states they need a ride and it will take 60 long to get to cancer clinic. Pt denied being out of home medications and needing any refills today. Pt qualifies for sickle cell standing order protocol.      Amanda Roth ok'd IVF/Pain today    Routing to Perla and Amanda Roth

## 2021-12-09 ENCOUNTER — NURSE TRIAGE (OUTPATIENT)
Dept: ONCOLOGY | Facility: CLINIC | Age: 21
End: 2021-12-09
Payer: COMMERCIAL

## 2021-12-09 ENCOUNTER — INFUSION THERAPY VISIT (OUTPATIENT)
Dept: INFUSION THERAPY | Facility: CLINIC | Age: 21
End: 2021-12-09
Attending: PEDIATRICS
Payer: COMMERCIAL

## 2021-12-09 ENCOUNTER — PATIENT OUTREACH (OUTPATIENT)
Dept: CARE COORDINATION | Facility: CLINIC | Age: 21
End: 2021-12-09
Payer: COMMERCIAL

## 2021-12-09 VITALS
TEMPERATURE: 99 F | HEART RATE: 114 BPM | RESPIRATION RATE: 18 BRPM | SYSTOLIC BLOOD PRESSURE: 134 MMHG | OXYGEN SATURATION: 88 % | DIASTOLIC BLOOD PRESSURE: 87 MMHG

## 2021-12-09 DIAGNOSIS — D57.00 SICKLE CELL PAIN CRISIS (H): ICD-10-CM

## 2021-12-09 DIAGNOSIS — G81.10 SPASTIC HEMIPLEGIA, UNSPECIFIED ETIOLOGY, UNSPECIFIED LATERALITY (H): Primary | ICD-10-CM

## 2021-12-09 PROCEDURE — 258N000003 HC RX IP 258 OP 636: Performed by: PEDIATRICS

## 2021-12-09 PROCEDURE — 96374 THER/PROPH/DIAG INJ IV PUSH: CPT

## 2021-12-09 PROCEDURE — 96361 HYDRATE IV INFUSION ADD-ON: CPT

## 2021-12-09 PROCEDURE — 250N000011 HC RX IP 250 OP 636: Performed by: PEDIATRICS

## 2021-12-09 PROCEDURE — 96376 TX/PRO/DX INJ SAME DRUG ADON: CPT

## 2021-12-09 RX ORDER — MORPHINE SULFATE 2 MG/ML
2 INJECTION, SOLUTION INTRAMUSCULAR; INTRAVENOUS
Status: DISCONTINUED | OUTPATIENT
Start: 2021-12-09 | End: 2021-12-09 | Stop reason: HOSPADM

## 2021-12-09 RX ORDER — HEPARIN SODIUM (PORCINE) LOCK FLUSH IV SOLN 100 UNIT/ML 100 UNIT/ML
5 SOLUTION INTRAVENOUS
Status: CANCELLED | OUTPATIENT
Start: 2022-01-01

## 2021-12-09 RX ORDER — MORPHINE SULFATE 2 MG/ML
2 INJECTION, SOLUTION INTRAMUSCULAR; INTRAVENOUS
Status: CANCELLED
Start: 2022-01-01

## 2021-12-09 RX ORDER — HEPARIN SODIUM (PORCINE) LOCK FLUSH IV SOLN 100 UNIT/ML 100 UNIT/ML
5 SOLUTION INTRAVENOUS
Status: DISCONTINUED | OUTPATIENT
Start: 2021-12-09 | End: 2021-12-09 | Stop reason: HOSPADM

## 2021-12-09 RX ORDER — HEPARIN SODIUM,PORCINE 10 UNIT/ML
5 VIAL (ML) INTRAVENOUS
Status: CANCELLED | OUTPATIENT
Start: 2022-01-01

## 2021-12-09 RX ORDER — ONDANSETRON 8 MG/1
8 TABLET, FILM COATED ORAL
Status: CANCELLED
Start: 2022-01-01

## 2021-12-09 RX ORDER — DIPHENHYDRAMINE HCL 25 MG
25 CAPSULE ORAL
Status: CANCELLED
Start: 2022-01-01

## 2021-12-09 RX ADMIN — MORPHINE SULFATE 2 MG: 2 INJECTION, SOLUTION INTRAMUSCULAR; INTRAVENOUS at 14:02

## 2021-12-09 RX ADMIN — HEPARIN 5 ML: 100 SYRINGE at 16:18

## 2021-12-09 RX ADMIN — DEXTROSE AND SODIUM CHLORIDE 500 ML: 5; 450 INJECTION, SOLUTION INTRAVENOUS at 14:00

## 2021-12-09 RX ADMIN — MORPHINE SULFATE 2 MG: 2 INJECTION, SOLUTION INTRAMUSCULAR; INTRAVENOUS at 15:01

## 2021-12-09 RX ADMIN — MORPHINE SULFATE 2 MG: 2 INJECTION, SOLUTION INTRAMUSCULAR; INTRAVENOUS at 16:02

## 2021-12-09 NOTE — PROGRESS NOTES
Social Work Note: Telephone Call  Oncology Clinic     Data/Intervention:  Patient Name:  Jennifer Cervantes  /Age: 2000, 21 years old     Call From: [facility]        Reason for Call:  Transportation     Assessment:   called [employer] to arrange ride through patient's insurance. [employer] arranged  for patient from home with [employer] ([phone]).  Patient will need to call when ready for return ride home. SW encouraged patient to call if there are any issues with their transportation.     Plan:  Patient is aware of the transportation plan.  available to assist with any other needs.      CARLOS Chavez,NATALIA  Hematology/Oncology Social Worker  Phone:[phone] Pager: [phone]

## 2021-12-09 NOTE — LETTER
12/9/2021         RE: Jennifer Cervantes  [address]  [place] [address]        Dear Colleague,    Thank you for referring your patient, Jennifer Cervantes, to the [facility]. Please see a copy of my visit note below.    Chief Complaint   Patient presents with     Infusion     IV fluids, pain meds     Infusion Nursing Note:  Jennifer Cervantes presents today for IV fluids, pain meds.    Patient seen by provider today: No. Dr. Duncan   present during visit today: Not Applicable.    Note:  Patient arrived to [facility] for sickle cell pain of 9/10 to lower back. 500 ml of D51/2NS given over 2 hours with 3 doses of morphine, Q 1 hour. Pain post infusion 5/10.    O2 88% today, patient not in distress. RN discussed with Dr. Duncan, no change in plan of care needed at this time.     Intravenous Access:  Implanted Port.    Treatment Conditions:  Not Applicable.      Post Infusion Assessment:  Patient tolerated infusion without incident.  Blood return noted pre and post infusion.  Site patent and intact, free from redness, edema or discomfort.  No evidence of extravasations.  Access discontinued per protocol.       Discharge Plan:   AVS printed for patient.  Patient to follow up with team.  Patient discharged in stable condition accompanied by: self.  Departure Mode: Ambulatory. Confirmed patient has a .     Administrations This Visit     dextrose 5% and 0.45% NaCl BOLUS     Admin Date  12/09/2021 Action  New Bag Dose  500 mL Rate  250 mL/hr Route  Intravenous Administered By  Tricia Adame, JOE          heparin 100 UNIT/ML injection 5 mL     Admin Date  12/09/2021 Action  Given Dose  5 mL Route  Intracatheter Administered By  Tricia Adame, RN          morphine (PF) injection 2 mg     Admin Date  12/09/2021 Action  Given Dose  2 mg Route  Intravenous Administered By  Tricia Adame, JOE           Admin Date  12/09/2021 Action  Given Dose  2 mg Route  Intravenous  "Administered By  Tricia Adame RN           Admin Date  12/09/2021 Action  Given Dose  2 mg Route  Intravenous Administered By  Tricia Adame RN              Vital signs:  Temp: 99  F (37.2  C) Temp src: Oral   Pulse: (!) 122 (2/2 pain)   Resp: 18 SpO2: (!) 88 % O2 Device: None (Room air)        Estimated body mass index is 29.18 kg/m  as calculated from the following:    Height as of 12/3/21: 1.626 m (5' 4\").    Weight as of 12/3/21: 77.1 kg (170 lb).                      Again, thank you for allowing me to participate in the care of your patient.        Sincerely,        [facility] [place]    "

## 2021-12-09 NOTE — TELEPHONE ENCOUNTER
[facility] Cancer [facility] Triage    Incoming call: Sickle Cell    IVF/pain needs ride and no labs    Scheduled for 2 pm today in [facility]    Routing to Andrei Machado, Dr. Duncan and Amanda Roth        Pt called in to triage requesting IVF pain meds for all over pain rated 9/10 x Tuesday days. Stated last took MS contin and oxy this morning without relief. Denied any fevers, chills, cough, sob, chest pain or other symptoms.  Assess for confusion, numbness, paralysis, vomiting, headache, vision issues, difficulty walking and/or talking. Pt's last infusion was 12/7, last clinic visit 11/30 Andrei Machado with follow-up on 12/20 with Perla. Patient states they do not have own ride and it will take 60 long to get to cancer clinic. Pt denied being out of home medications and needing any refills today. Pt qualifies for sickle cell standing order protocol.    *ED on 12/3 and 12/5 for sickle cell crisis pain and hypokalemia

## 2021-12-09 NOTE — TELEPHONE ENCOUNTER
Narcotic Refill Request    Medication(s) requested: Oxycodone 15mg  Person Requesting Refill: Jennifer (pt)  What pain is the medication treating: lower back  How is the medication being taken?:Max of 6  Does pt have enough for today? Has a few left  Is pain being adequately controlled on the current regimen?: Yes with needed IVF/Pain appts sometimes  Experiencing any side effects from medication?: denies    Date of most recent appointment: 11/30/21 Andrei HIGGINS  Any No Show Visits:non recently, some cancellations occured  Next appointment:   12/20/21 Dr. Duncan  Last fill date and by whom:  11/30Andrei HIGGINS     Reviewed:       Routed to Andrei HIGGINS

## 2021-12-10 ENCOUNTER — TELEPHONE (OUTPATIENT)
Dept: ONCOLOGY | Facility: CLINIC | Age: 21
End: 2021-12-10
Payer: COMMERCIAL

## 2021-12-10 ENCOUNTER — HOSPITAL ENCOUNTER (EMERGENCY)
Facility: CLINIC | Age: 21
Discharge: HOME | End: 2021-12-10
Attending: EMERGENCY MEDICINE | Admitting: EMERGENCY MEDICINE
Payer: COMMERCIAL

## 2021-12-10 VITALS
WEIGHT: 170 LBS | SYSTOLIC BLOOD PRESSURE: 157 MMHG | HEART RATE: 107 BPM | BODY MASS INDEX: 29.02 KG/M2 | OXYGEN SATURATION: 92 % | DIASTOLIC BLOOD PRESSURE: 86 MMHG | TEMPERATURE: 99 F | RESPIRATION RATE: 20 BRPM | HEIGHT: 64 IN

## 2021-12-10 DIAGNOSIS — D57.00 SICKLE CELL PAIN CRISIS (H): ICD-10-CM

## 2021-12-10 LAB
ALBUMIN SERPL-MCNC: 3.8 G/DL (ref 3.4–5)
ALBUMIN UR-MCNC: NEGATIVE MG/DL
ALP SERPL-CCNC: 84 U/L (ref 40–150)
ALT SERPL W P-5'-P-CCNC: 79 U/L (ref 0–50)
ANION GAP SERPL CALCULATED.3IONS-SCNC: 6 MMOL/L (ref 3–14)
APPEARANCE UR: ABNORMAL
AST SERPL W P-5'-P-CCNC: 100 U/L (ref 0–45)
BASOPHILS # BLD MANUAL: 0 10E3/UL (ref 0–0.2)
BASOPHILS NFR BLD MANUAL: 0 %
BILIRUB SERPL-MCNC: 3.4 MG/DL (ref 0.2–1.3)
BILIRUB UR QL STRIP: NEGATIVE
BUN SERPL-MCNC: 3 MG/DL (ref 7–30)
CALCIUM SERPL-MCNC: 8.6 MG/DL (ref 8.5–10.1)
CHLORIDE BLD-SCNC: 111 MMOL/L (ref 94–109)
CO2 SERPL-SCNC: 21 MMOL/L (ref 20–32)
COLOR UR AUTO: ABNORMAL
CREAT SERPL-MCNC: 0.51 MG/DL (ref 0.52–1.04)
EOSINOPHIL # BLD MANUAL: 0.8 10E3/UL (ref 0–0.7)
EOSINOPHIL NFR BLD MANUAL: 6 %
ERYTHROCYTE [DISTWIDTH] IN BLOOD BY AUTOMATED COUNT: 28 % (ref 10–15)
GFR SERPL CREATININE-BSD FRML MDRD: >90 ML/MIN/1.73M2
GLUCOSE BLD-MCNC: 100 MG/DL (ref 70–99)
GLUCOSE UR STRIP-MCNC: NEGATIVE MG/DL
HCG SERPL QL: NEGATIVE
HCT VFR BLD AUTO: 22.2 % (ref 35–47)
HGB BLD-MCNC: 7.9 G/DL (ref 11.7–15.7)
HGB UR QL STRIP: NEGATIVE
HOLD SPECIMEN: NORMAL
INR PPP: 1.21 (ref 0.85–1.15)
KETONES UR STRIP-MCNC: NEGATIVE MG/DL
LACTATE SERPL-SCNC: 1.1 MMOL/L (ref 0.7–2)
LEUKOCYTE ESTERASE UR QL STRIP: ABNORMAL
LYMPHOCYTES # BLD MANUAL: 2.1 10E3/UL (ref 0.8–5.3)
LYMPHOCYTES NFR BLD MANUAL: 17 %
MCH RBC QN AUTO: 31.3 PG (ref 26.5–33)
MCHC RBC AUTO-ENTMCNC: 35.6 G/DL (ref 31.5–36.5)
MCV RBC AUTO: 88 FL (ref 78–100)
MONOCYTES # BLD MANUAL: 0.4 10E3/UL (ref 0–1.3)
MONOCYTES NFR BLD MANUAL: 3 %
MUCOUS THREADS #/AREA URNS LPF: PRESENT /LPF
NEUTROPHILS # BLD MANUAL: 9.3 10E3/UL (ref 1.6–8.3)
NEUTROPHILS NFR BLD MANUAL: 74 %
NITRATE UR QL: NEGATIVE
NRBC # BLD AUTO: 2.3 10E3/UL
NRBC BLD MANUAL-RTO: 18 %
PH UR STRIP: 5.5 [PH] (ref 5–7)
PLAT MORPH BLD: ABNORMAL
PLATELET # BLD AUTO: 456 10E3/UL (ref 150–450)
POLYCHROMASIA BLD QL SMEAR: ABNORMAL
POTASSIUM BLD-SCNC: 4 MMOL/L (ref 3.4–5.3)
PROT SERPL-MCNC: 8 G/DL (ref 6.8–8.8)
RBC # BLD AUTO: 2.52 10E6/UL (ref 3.8–5.2)
RBC MORPH BLD: ABNORMAL
RBC URINE: 0 /HPF
RETICS # AUTO: 0.66 10E6/UL (ref 0.03–0.1)
RETICS/RBC NFR AUTO: 26.3 % (ref 0.5–2)
SICKLE CELLS BLD QL SMEAR: ABNORMAL
SODIUM SERPL-SCNC: 138 MMOL/L (ref 133–144)
SP GR UR STRIP: 1.01 (ref 1–1.03)
SQUAMOUS EPITHELIAL: 2 /HPF
UROBILINOGEN UR STRIP-MCNC: NORMAL MG/DL
WBC # BLD AUTO: 12.6 10E3/UL (ref 4–11)
WBC URINE: 6 /HPF

## 2021-12-10 PROCEDURE — 96375 TX/PRO/DX INJ NEW DRUG ADDON: CPT | Performed by: EMERGENCY MEDICINE

## 2021-12-10 PROCEDURE — 83605 ASSAY OF LACTIC ACID: CPT | Performed by: EMERGENCY MEDICINE

## 2021-12-10 PROCEDURE — 99284 EMERGENCY DEPT VISIT MOD MDM: CPT | Mod: GC | Performed by: EMERGENCY MEDICINE

## 2021-12-10 PROCEDURE — 250N000011 HC RX IP 250 OP 636

## 2021-12-10 PROCEDURE — 36415 COLL VENOUS BLD VENIPUNCTURE: CPT | Performed by: FAMILY MEDICINE

## 2021-12-10 PROCEDURE — 250N000011 HC RX IP 250 OP 636: Performed by: EMERGENCY MEDICINE

## 2021-12-10 PROCEDURE — 250N000013 HC RX MED GY IP 250 OP 250 PS 637

## 2021-12-10 PROCEDURE — 250N000009 HC RX 250: Mod: JW | Performed by: EMERGENCY MEDICINE

## 2021-12-10 PROCEDURE — 81001 URINALYSIS AUTO W/SCOPE: CPT | Performed by: EMERGENCY MEDICINE

## 2021-12-10 PROCEDURE — 83605 ASSAY OF LACTIC ACID: CPT | Performed by: FAMILY MEDICINE

## 2021-12-10 PROCEDURE — 250N000009 HC RX 250: Mod: JW

## 2021-12-10 PROCEDURE — 82040 ASSAY OF SERUM ALBUMIN: CPT | Performed by: EMERGENCY MEDICINE

## 2021-12-10 PROCEDURE — 85027 COMPLETE CBC AUTOMATED: CPT | Performed by: EMERGENCY MEDICINE

## 2021-12-10 PROCEDURE — 85610 PROTHROMBIN TIME: CPT

## 2021-12-10 PROCEDURE — 258N000003 HC RX IP 258 OP 636

## 2021-12-10 PROCEDURE — 85045 AUTOMATED RETICULOCYTE COUNT: CPT | Performed by: EMERGENCY MEDICINE

## 2021-12-10 PROCEDURE — 96361 HYDRATE IV INFUSION ADD-ON: CPT | Performed by: EMERGENCY MEDICINE

## 2021-12-10 PROCEDURE — 84703 CHORIONIC GONADOTROPIN ASSAY: CPT | Performed by: EMERGENCY MEDICINE

## 2021-12-10 PROCEDURE — 96374 THER/PROPH/DIAG INJ IV PUSH: CPT | Performed by: EMERGENCY MEDICINE

## 2021-12-10 PROCEDURE — 99284 EMERGENCY DEPT VISIT MOD MDM: CPT | Mod: 25 | Performed by: EMERGENCY MEDICINE

## 2021-12-10 RX ORDER — OXYCODONE HYDROCHLORIDE 15 MG/1
15 TABLET ORAL EVERY 4 HOURS PRN
Qty: 50 TABLET | Refills: 0 | Status: SHIPPED | OUTPATIENT
Start: 2021-12-10 | End: 2021-12-17

## 2021-12-10 RX ORDER — KETOROLAC TROMETHAMINE 15 MG/ML
15 INJECTION, SOLUTION INTRAMUSCULAR; INTRAVENOUS ONCE
Status: COMPLETED | OUTPATIENT
Start: 2021-12-10 | End: 2021-12-10

## 2021-12-10 RX ORDER — LIDOCAINE 40 MG/G
CREAM TOPICAL
Status: DISCONTINUED | OUTPATIENT
Start: 2021-12-10 | End: 2021-12-10 | Stop reason: HOSPADM

## 2021-12-10 RX ORDER — SODIUM CHLORIDE, SODIUM LACTATE, POTASSIUM CHLORIDE, CALCIUM CHLORIDE 600; 310; 30; 20 MG/100ML; MG/100ML; MG/100ML; MG/100ML
INJECTION, SOLUTION INTRAVENOUS CONTINUOUS
Status: DISCONTINUED | OUTPATIENT
Start: 2021-12-10 | End: 2021-12-10 | Stop reason: HOSPADM

## 2021-12-10 RX ORDER — HEPARIN SODIUM (PORCINE) LOCK FLUSH IV SOLN 100 UNIT/ML 100 UNIT/ML
5-10 SOLUTION INTRAVENOUS
Status: DISCONTINUED | OUTPATIENT
Start: 2021-12-10 | End: 2021-12-10 | Stop reason: HOSPADM

## 2021-12-10 RX ADMIN — Medication 5 MG: at 19:30

## 2021-12-10 RX ADMIN — Medication 5 MG: at 18:17

## 2021-12-10 RX ADMIN — OXYCODONE HYDROCHLORIDE 15 MG: 5 TABLET ORAL at 16:55

## 2021-12-10 RX ADMIN — OXYCODONE HYDROCHLORIDE 15 MG: 5 TABLET ORAL at 20:41

## 2021-12-10 RX ADMIN — HEPARIN 5 ML: 100 SYRINGE at 21:02

## 2021-12-10 RX ADMIN — SODIUM CHLORIDE, POTASSIUM CHLORIDE, SODIUM LACTATE AND CALCIUM CHLORIDE: 600; 310; 30; 20 INJECTION, SOLUTION INTRAVENOUS at 16:54

## 2021-12-10 RX ADMIN — KETOROLAC TROMETHAMINE 15 MG: 15 INJECTION, SOLUTION INTRAMUSCULAR; INTRAVENOUS at 16:55

## 2021-12-10 ASSESSMENT — ENCOUNTER SYMPTOMS
GASTROINTESTINAL NEGATIVE: 1
ENDOCRINE NEGATIVE: 1
CONSTITUTIONAL NEGATIVE: 1
PSYCHIATRIC NEGATIVE: 1
NEUROLOGICAL NEGATIVE: 1
ALLERGIC/IMMUNOLOGIC NEGATIVE: 1
HEMATOLOGIC/LYMPHATIC NEGATIVE: 1
EYES NEGATIVE: 1
RESPIRATORY NEGATIVE: 1
CARDIOVASCULAR NEGATIVE: 1
BACK PAIN: 1

## 2021-12-10 ASSESSMENT — MIFFLIN-ST. JEOR: SCORE: 1516.11

## 2021-12-10 NOTE — ED TRIAGE NOTES
Pt comes in with sickle cell lower back pain that started this morning and uncontrolled by home medications--MS Contin, Oxycodone and muscle relaxant. Pt unable to get into clinic.

## 2021-12-10 NOTE — ED PROVIDER NOTES
ED Provider Note  [facility]      History     Chief Complaint   Patient presents with     Sickle Cell Pain Crisis     HPI  Jennifer Cervantes is a 21 year old female who has a past medical history of sickle cell disease, CVA, DVT on warfarin  Presented with lower back pain of 12 hours duration.  Lower back pain described as sharp, constant, non radiating worsened by movement with intensity of 10/10, similar to the episodes she had in the past but higher in intensity. She tried oxycodone 15mg and  And MS contin 15mg at 12:00 but did not respond. She does not have abdominal pain, chest pain or pain in the limbs. No headache. No shortness of breath. She denies headache or blurring of vision. No change in bowel habit. No nausea and vomiting. No fatigue.Patient visited Ed on 12/05/2021 for pain crisis and was discharged improved.    Past Medical History  Past Medical History:   Diagnosis Date     Anxiety      Bleeding disorder (H)      Blood clotting disorder (H)      Cerebral infarction (H) 2015     Cognitive developmental delay     low IQ. 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 (H)     right hand contractures     Iron overload due to repeated red blood cell transfusions      Migraines      Multiple subsegmental pulmonary emboli without acute cor pulmonale (H) 02/01/2021     Oppositional defiant behavior      Superficial venous thrombosis of arm, right 03/25/2021     Uncomplicated asthma      Past Surgical History:   Procedure Laterality Date     AS INSERT TUNNELED CV 2 CATH W/O PORT/PUMP       C BREAST REDUCTION (INCLUDES LIPO) TIER 3 Bilateral 04/23/2019     CHOLECYSTECTOMY       CV RIGHT HEART CATH MEASUREMENTS RECORDED N/A 11/18/2021    Procedure: Right Heart Cath;  Surgeon: Jackson Stauffer MD;  Location:  HEART CARDIAC CATH LAB     INSERT PORT VASCULAR  ACCESS Left 4/21/2021    Procedure: INSERTION, VASCULAR ACCESS PORT (NOT SURE ON SIDE UNTIL REMOVAL);  Surgeon: Rajan More MD;  Location: [facility] OR     IR CHEST PORT PLACEMENT > 5 YRS OF AGE  4/21/2021     IR CVC NON TUNNEL LINE REMOVAL  5/6/2021     IR CVC NON TUNNEL PLACEMENT  04/07/2020     IR CVC NON TUNNEL PLACEMENT  4/30/2021     IR PORT REMOVAL LEFT  4/21/2021     REMOVE PORT VASCULAR ACCESS Left 4/21/2021    Procedure: REMOVAL, VASCULAR ACCESS PORT LEFT;  Surgeon: Rajan More MD;  Location: [facility] OR     REPAIR TENDON ELBOW Right 10/02/2019    Procedure: Right Elbow Flexor Lengthening, Flexor Pronator Slide Of Wrist and Finger, Thumb Adductor Lengthening;  Surgeon: Anai Franco MD;  Location: [facility] OR     TONSILLECTOMY Bilateral 10/02/2019    Procedure: Bilateral Tonsillectomy;  Surgeon: Farhana Guy MD;  Location: [facility] OR     morphine (MS CONTIN) 15 MG CR tablet  oxyCODONE IR (ROXICODONE) 15 MG tablet  acetaminophen (TYLENOL) 325 MG tablet  albuterol (PROAIR HFA/PROVENTIL HFA/VENTOLIN HFA) 108 (90 Base) MCG/ACT inhaler  albuterol (PROVENTIL) (2.5 MG/3ML) 0.083% neb solution  ARIPiprazole (ABILIFY) 2 MG tablet  budesonide-formoterol (SYMBICORT) 160-4.5 MCG/ACT Inhaler  deferasirox (JADENU) 360 MG tablet  diphenhydrAMINE (BENADRYL) 25 MG capsule  EPINEPHrine (ANY BX GENERIC EQUIV) 0.3 MG/0.3ML injection 2-pack  Hydroxyurea 1000 MG TABS  hydrOXYzine (ATARAX) 25 MG tablet  lidocaine-prilocaine (EMLA) 2.5-2.5 % external cream  medroxyPROGESTERone (DEPO-PROVERA) 150 MG/ML IM injection  naloxone (NARCAN) 4 MG/0.1ML nasal spray  omeprazole (PRILOSEC) 20 MG DR capsule  ondansetron (ZOFRAN) 8 MG tablet  sertraline (ZOLOFT) 100 MG tablet  warfarin ANTICOAGULANT (COUMADIN) 5 MG tablet  warfarin ANTICOAGULANT (COUMADIN) 7.5 MG tablet      Allergies   Allergen Reactions     Contrast Dye      Hives and breathing issues     Fish-Derived Products Hives     Seafood Hives     Diagnostic X-Ray Materials   "    Gadolinium      Family History  Family History   Problem Relation Age of Onset     Sickle Cell Trait Mother      Hypertension Mother      Asthma Mother      Sickle Cell Trait Father      Social History   Social History     Tobacco Use     Smoking status: Never Smoker     Smokeless tobacco: Never Used   Substance Use Topics     Alcohol use: Not Currently     Alcohol/week: 0.0 standard drinks     Drug use: Never      Past medical history, past surgical history, medications, allergies, family history, and social history were reviewed with the patient. No additional pertinent items.       Review of Systems   Constitutional: Negative.    HENT: Negative.    Eyes: Negative.    Respiratory: Negative.    Cardiovascular: Negative.    Gastrointestinal: Negative.    Endocrine: Negative.    Musculoskeletal: Positive for back pain.   Skin: Negative.    Allergic/Immunologic: Negative.    Neurological: Negative.    Hematological: Negative.    Psychiatric/Behavioral: Negative.         Physical Exam   BP: 138/75  Pulse: (!) 123  Temp: 99  F (37.2  C)  Resp: 22  Height: 162.6 cm (5' 4\")  Weight: 77.1 kg (170 lb)  SpO2: 91 %  Physical Exam  Constitutional:   Was tearful. Awake and alert.   Eyes:   No scleral icterus. PERRLA. EOMI  ENT and Mouth:   NC/AT. Oropharynx clear.  Respiratory:   Breathing comfortably on room air. CTAB. No accessory muscle use. No crackles, wheezes, rhonchi or rales.  Cardiovascular:   No JVD, RRR. No murmurs, rubs or gallops.  GI:   Soft, nontender, nondistended. No guarding or rebound tenderness. No organomegaly.  Skin:   No rash. No ecchymoses or petechiae.  Musculoskeletal:   Mild tenderness in her lower lumbar area. Normal muscle bulk and tone. Extremities warm and well perfused. DP and radial pulses. No edema  Neurologic:   AOx3. CN grossly II-XII intact. No focal deficits. Face symmetric. Power in LE 5/5. In the RUE 4/5, spasticity and LLE, 5/5    ED Course     ED Course as of 12/13/21 0758   Fri Dec " 10, 2021   1646 Pain is still uncontrolled. Rates 10/10. Reviewed care plan. Has had good relief with ketamine infusion previously. Will try low dose ketamine bolus to address pain (starting with lower dose than typical due to history of side effects with higher infusion rate)   1712 CBC with platelets differential(!)  Chronic thrombocytosis, anemia and leukocytosis.    1910 Retic count elevated. Not consistent with aplastic crisis.    1912 On reassessment, reports that her pain has improved to 6-7/10 after receiving the ketamine. Discussed a repeat dose and then reassess. She is in agreement with the plan.      Procedures               Results for orders placed or performed during the hospital encounter of 12/10/21   Comprehensive metabolic panel     Status: Abnormal   Result Value Ref Range    Sodium 138 133 - 144 mmol/L    Potassium 4.0 3.4 - 5.3 mmol/L    Chloride 111 (H) 94 - 109 mmol/L    Carbon Dioxide (CO2) 21 20 - 32 mmol/L    Anion Gap 6 3 - 14 mmol/L    Urea Nitrogen 3 (L) 7 - 30 mg/dL    Creatinine 0.51 (L) 0.52 - 1.04 mg/dL    Calcium 8.6 8.5 - 10.1 mg/dL    Glucose 100 (H) 70 - 99 mg/dL    Alkaline Phosphatase 84 40 - 150 U/L     (H) 0 - 45 U/L    ALT 79 (H) 0 - 50 U/L    Protein Total 8.0 6.8 - 8.8 g/dL    Albumin 3.8 3.4 - 5.0 g/dL    Bilirubin Total 3.4 (H) 0.2 - 1.3 mg/dL    GFR Estimate >90 >60 mL/min/1.73m2   Lactic acid whole blood STAT     Status: Normal   Result Value Ref Range    Lactic Acid 1.1 0.7 - 2.0 mmol/L   Reticulocyte count     Status: Abnormal   Result Value Ref Range    % Reticulocyte 26.3 (H) 0.5 - 2.0 %    Absolute Reticulocyte 0.663 (H) 0.025 - 0.095 10e6/uL   CBC with platelets and differential     Status: Abnormal   Result Value Ref Range    WBC Count 12.6 (H) 4.0 - 11.0 10e3/uL    RBC Count 2.52 (L) 3.80 - 5.20 10e6/uL    Hemoglobin 7.9 (L) 11.7 - 15.7 g/dL    Hematocrit 22.2 (L) 35.0 - 47.0 %    MCV 88 78 - 100 fL    MCH 31.3 26.5 - 33.0 pg    MCHC 35.6 31.5 - 36.5  g/dL    RDW 28.0 (H) 10.0 - 15.0 %    Platelet Count 456 (H) 150 - 450 10e3/uL   Extra Blue Top Tube     Status: None   Result Value Ref Range    Hold Specimen JIC    Extra Red Top Tube     Status: None   Result Value Ref Range    Hold Specimen JIC    Extra Green Top (Lithium Heparin) Tube     Status: None   Result Value Ref Range    Hold Specimen JIC    Extra Purple Top Tube     Status: None   Result Value Ref Range    Hold Specimen JIC    HCG qualitative pregnancy (blood)     Status: Normal   Result Value Ref Range    hCG Serum Qualitative Negative Negative   UA with Microscopic reflex to Culture     Status: Abnormal    Specimen: Urine, Clean Catch   Result Value Ref Range    Color Urine Orange (A) Colorless, Straw, Light Yellow, Yellow    Appearance Urine Slightly Cloudy (A) Clear    Glucose Urine Negative Negative mg/dL    Bilirubin Urine Negative Negative    Ketones Urine Negative Negative mg/dL    Specific Gravity Urine 1.010 1.003 - 1.035    Blood Urine Negative Negative    pH Urine 5.5 5.0 - 7.0    Protein Albumin Urine Negative Negative mg/dL    Urobilinogen Urine Normal Normal, 2.0 mg/dL    Nitrite Urine Negative Negative    Leukocyte Esterase Urine Trace (A) Negative    Mucus Urine Present (A) None Seen /LPF    RBC Urine 0 <=2 /HPF    WBC Urine 6 (H) <=5 /HPF    Squamous Epithelials Urine 2 (H) <=1 /HPF    Narrative    Urine Culture not indicated   INR     Status: Abnormal   Result Value Ref Range    INR 1.21 (H) 0.85 - 1.15   Manual Differential     Status: Abnormal   Result Value Ref Range    % Neutrophils 74 %    % Lymphocytes 17 %    % Monocytes 3 %    % Eosinophils 6 %    % Basophils 0 %    NRBCs per 100 WBC 18 (H) <=0 %    Absolute Neutrophils 9.3 (H) 1.6 - 8.3 10e3/uL    Absolute Lymphocytes 2.1 0.8 - 5.3 10e3/uL    Absolute Monocytes 0.4 0.0 - 1.3 10e3/uL    Absolute Eosinophils 0.8 (H) 0.0 - 0.7 10e3/uL    Absolute Basophils 0.0 0.0 - 0.2 10e3/uL    Absolute NRBCs 2.3 (H) <=0.0 10e3/uL    RBC  Morphology Confirmed RBC Indices     Platelet Assessment  Automated Count Confirmed. Platelet morphology is normal.     Automated Count Confirmed. Platelet morphology is normal.    Polychromasia Marked (A) None Seen    Sickle Cells Marked (A) None Seen   CBC with platelets differential     Status: Abnormal    Narrative    The following orders were created for panel order CBC with platelets differential.  Procedure                               Abnormality         Status                     ---------                               -----------         ------                     CBC with platelets and d...[[id number]]  Abnormal            Final result               Manual Differential[[id number]]          Abnormal            Final result                 Please view results for these tests on the individual orders.   [place] Draw     Status: None    Narrative    The following orders were created for panel order [place] Draw.  Procedure                               Abnormality         Status                     ---------                               -----------         ------                     Extra Blue Top Tube[[id number]]                              Final result               Extra Red Top Tube[[id number]]                               Final result               Extra Green Top (Lithium...[[id number]]                      Final result               Extra Purple Top Tube[[id number]]                            Final result                 Please view results for these tests on the individual orders.     Medications   ketorolac (TORADOL) injection 15 mg (15 mg Intravenous Given 12/10/21 1655)   oxyCODONE (ROXICODONE) tablet 15 mg (15 mg Oral Given 12/10/21 1655)   ketamine (KETALAR) injection 5 mg (5 mg Intravenous Given 12/10/21 1930)   oxyCODONE (ROXICODONE) tablet 15 mg (15 mg Oral Given 12/10/21 2041)        Assessments & Plan (with Medical Decision Making)   Jennifer Cervantes is a 21 year old female who is Jennifer Cervantes  is a 21 year old female who has a past medical history of sickle cell disease, CVA, DVT on warfarin  Presented with lower back pain of 12 hours duration.   CBC showed stable hgb, stable WBC count , appropriately elevated  Retic count. Lactic acid was normal.    She was put on Ringer lactate infusion , pain managed with ketorolac, oxycodone and ketamine and showed improvement,   Patient was discharged with advise to take pain medications and return to ED for new or worsening symptoms. Patient agrees with the plan    I have reviewed the nursing notes. I have reviewed the findings, diagnosis, plan and need for follow up with the patient.    Discharge Medication List as of 12/10/2021  8:55 PM          Final diagnoses:   Sickle cell pain crisis (H)   Lance Camarena MD  PGY1, [facility] EMERGENCY DEPARTMENT  12/10/2021     Lance Camarena MD  Resident  12/13/21 0800

## 2021-12-10 NOTE — ED PROVIDER NOTES
--    ED Attending Physician Attestation    I Sonja Butcher MD, cared for this patient with the Resident. I have performed a history and physical examination of the patient and discussed management with the resident. I reviewed the resident's documentation above and agree with the documented findings and plan of care.    Summary of HPI, PE, ED Course   Patient is a 21 year old female with history of sickle cell disease (HbSS) evaluated in the emergency department for low back pain. 12hrs of sharp constant lower back pain. Rates pain as 10/10. Did not respond to her home pain medications including oxycodone.    Exam notable for RRR, clear lungs, soft abdomen without tenderness, no CVA tenderness. ED course notable for urine not consistent with infection, CBC at baseline. Followed her pain plan with PO oxycodone, IV toradol and several boluses of low dose ketamine. She found IV ketamine to be quite helpful and reduced her pain to a 6. She is comfortable with plan to discharge home and follow up as an outpatient. Return precautions reviewed. After the completion of care in the emergency department, the patient was discharged.    Critical Care & Procedures  Not applicable.    Medical Decision Making  The medical record was reviewed and interpreted.  Current labs reviewed and interpreted.  Previous labs reviewed and interpreted.      Sonja Butcher MD  Emergency Medicine          Sonja Butcher MD  12/11/21 0044

## 2021-12-10 NOTE — CONFIDENTIAL NOTE
Pt called in to triage requesting IVF pain meds for lower back pain rated 9/10 x 12 hours. Infusion yesterday was helpful but then pain increased last night. Stated last took prn Ms Oleg, oxycodone @6am this morning without relief. Denied any fevers, chills, cough, sob, chest pain, numbness or tingling or other symptoms not typical of sickle cell pain.   Pt's last infusion was 12/9/21, last clinic visit 11/30/21 with follow-up on 12/20/21 with DR Duncan.   Patient states they do not have own ride and it will take 25 minutes  long to get to cancer clinic.   Pt denied being out of home medications and needing any refills today.   Pt qualifies for sickle cell standing order protocol.  If you do not hear from the infusion center by 2pm then you will not be able to get in for an infusion today.   Please note, if you are late for your appt, you risk losing your infusion appt as it may delay another patient's infusion who arrived on time.

## 2021-12-11 ENCOUNTER — NURSE TRIAGE (OUTPATIENT)
Dept: NURSING | Facility: CLINIC | Age: 21
End: 2021-12-11
Payer: COMMERCIAL

## 2021-12-11 NOTE — DISCHARGE INSTRUCTIONS
Continue taking your pain medication as prescribed. Return to the ED for new or worsening symptoms.

## 2021-12-12 ENCOUNTER — HOSPITAL ENCOUNTER (EMERGENCY)
Facility: CLINIC | Age: 21
Discharge: HOME | End: 2021-12-12
Attending: EMERGENCY MEDICINE | Admitting: EMERGENCY MEDICINE
Payer: COMMERCIAL

## 2021-12-12 VITALS
TEMPERATURE: 98.9 F | WEIGHT: 170 LBS | BODY MASS INDEX: 29.02 KG/M2 | OXYGEN SATURATION: 94 % | HEART RATE: 102 BPM | SYSTOLIC BLOOD PRESSURE: 122 MMHG | HEIGHT: 64 IN | RESPIRATION RATE: 16 BRPM | DIASTOLIC BLOOD PRESSURE: 66 MMHG

## 2021-12-12 DIAGNOSIS — D57.00 SICKLE CELL PAIN CRISIS (H): ICD-10-CM

## 2021-12-12 LAB
ALBUMIN SERPL-MCNC: 3.8 G/DL (ref 3.4–5)
ALP SERPL-CCNC: 78 U/L (ref 40–150)
ALT SERPL W P-5'-P-CCNC: 76 U/L (ref 0–50)
ANION GAP SERPL CALCULATED.3IONS-SCNC: 9 MMOL/L (ref 3–14)
AST SERPL W P-5'-P-CCNC: 83 U/L (ref 0–45)
BASOPHILS # BLD MANUAL: 0.1 10E3/UL (ref 0–0.2)
BASOPHILS NFR BLD MANUAL: 1 %
BILIRUB SERPL-MCNC: 3 MG/DL (ref 0.2–1.3)
BUN SERPL-MCNC: 3 MG/DL (ref 7–30)
CALCIUM SERPL-MCNC: 8.7 MG/DL (ref 8.5–10.1)
CHLORIDE BLD-SCNC: 111 MMOL/L (ref 94–109)
CO2 SERPL-SCNC: 19 MMOL/L (ref 20–32)
CREAT SERPL-MCNC: 0.52 MG/DL (ref 0.52–1.04)
EOSINOPHIL # BLD MANUAL: 0.7 10E3/UL (ref 0–0.7)
EOSINOPHIL NFR BLD MANUAL: 6 %
ERYTHROCYTE [DISTWIDTH] IN BLOOD BY AUTOMATED COUNT: 26.7 % (ref 10–15)
GFR SERPL CREATININE-BSD FRML MDRD: >90 ML/MIN/1.73M2
GLUCOSE BLD-MCNC: 118 MG/DL (ref 70–99)
HCT VFR BLD AUTO: 23.8 % (ref 35–47)
HGB BLD-MCNC: 8.2 G/DL (ref 11.7–15.7)
LYMPHOCYTES # BLD MANUAL: 2 10E3/UL (ref 0.8–5.3)
LYMPHOCYTES NFR BLD MANUAL: 18 %
MCH RBC QN AUTO: 31.2 PG (ref 26.5–33)
MCHC RBC AUTO-ENTMCNC: 34.5 G/DL (ref 31.5–36.5)
MCV RBC AUTO: 91 FL (ref 78–100)
MONOCYTES # BLD MANUAL: 0.4 10E3/UL (ref 0–1.3)
MONOCYTES NFR BLD MANUAL: 4 %
NEUTROPHILS # BLD MANUAL: 8 10E3/UL (ref 1.6–8.3)
NEUTROPHILS NFR BLD MANUAL: 71 %
NRBC # BLD AUTO: 3.7 10E3/UL
NRBC BLD MANUAL-RTO: 33 %
PLAT MORPH BLD: ABNORMAL
PLATELET # BLD AUTO: 431 10E3/UL (ref 150–450)
POLYCHROMASIA BLD QL SMEAR: ABNORMAL
POTASSIUM BLD-SCNC: 3.9 MMOL/L (ref 3.4–5.3)
PROT SERPL-MCNC: 7.9 G/DL (ref 6.8–8.8)
RBC # BLD AUTO: 2.63 10E6/UL (ref 3.8–5.2)
RBC MORPH BLD: ABNORMAL
RETICS # AUTO: 0.71 10E6/UL (ref 0.03–0.1)
RETICS/RBC NFR AUTO: 26.9 % (ref 0.5–2)
SICKLE CELLS BLD QL SMEAR: ABNORMAL
SODIUM SERPL-SCNC: 139 MMOL/L (ref 133–144)
TARGETS BLD QL SMEAR: SLIGHT
WBC # BLD AUTO: 11.2 10E3/UL (ref 4–11)

## 2021-12-12 PROCEDURE — 85045 AUTOMATED RETICULOCYTE COUNT: CPT | Performed by: EMERGENCY MEDICINE

## 2021-12-12 PROCEDURE — 258N000003 HC RX IP 258 OP 636: Performed by: EMERGENCY MEDICINE

## 2021-12-12 PROCEDURE — 250N000013 HC RX MED GY IP 250 OP 250 PS 637: Performed by: EMERGENCY MEDICINE

## 2021-12-12 PROCEDURE — 250N000009 HC RX 250: Performed by: EMERGENCY MEDICINE

## 2021-12-12 PROCEDURE — 99285 EMERGENCY DEPT VISIT HI MDM: CPT | Mod: 25 | Performed by: EMERGENCY MEDICINE

## 2021-12-12 PROCEDURE — 99285 EMERGENCY DEPT VISIT HI MDM: CPT | Performed by: EMERGENCY MEDICINE

## 2021-12-12 PROCEDURE — 96374 THER/PROPH/DIAG INJ IV PUSH: CPT | Performed by: EMERGENCY MEDICINE

## 2021-12-12 PROCEDURE — 96376 TX/PRO/DX INJ SAME DRUG ADON: CPT | Performed by: EMERGENCY MEDICINE

## 2021-12-12 PROCEDURE — 85027 COMPLETE CBC AUTOMATED: CPT | Performed by: EMERGENCY MEDICINE

## 2021-12-12 PROCEDURE — 82040 ASSAY OF SERUM ALBUMIN: CPT | Performed by: EMERGENCY MEDICINE

## 2021-12-12 PROCEDURE — 36415 COLL VENOUS BLD VENIPUNCTURE: CPT | Performed by: EMERGENCY MEDICINE

## 2021-12-12 RX ORDER — OXYCODONE HYDROCHLORIDE 10 MG/1
10 TABLET ORAL EVERY 4 HOURS PRN
Status: DISCONTINUED | OUTPATIENT
Start: 2021-12-12 | End: 2021-12-12 | Stop reason: HOSPADM

## 2021-12-12 RX ORDER — OXYCODONE HYDROCHLORIDE 10 MG/1
20 TABLET ORAL EVERY 4 HOURS PRN
Status: DISCONTINUED | OUTPATIENT
Start: 2021-12-12 | End: 2021-12-12 | Stop reason: HOSPADM

## 2021-12-12 RX ORDER — ONDANSETRON 2 MG/ML
8 INJECTION INTRAMUSCULAR; INTRAVENOUS ONCE
Status: DISCONTINUED | OUTPATIENT
Start: 2021-12-12 | End: 2021-12-12 | Stop reason: HOSPADM

## 2021-12-12 RX ORDER — KETOROLAC TROMETHAMINE 15 MG/ML
15 INJECTION, SOLUTION INTRAMUSCULAR; INTRAVENOUS ONCE
Status: DISCONTINUED | OUTPATIENT
Start: 2021-12-12 | End: 2021-12-12 | Stop reason: HOSPADM

## 2021-12-12 RX ADMIN — Medication 5 MG: at 17:25

## 2021-12-12 RX ADMIN — OXYCODONE HYDROCHLORIDE 10 MG: 10 TABLET ORAL at 16:35

## 2021-12-12 RX ADMIN — Medication 5 MG: at 15:29

## 2021-12-12 RX ADMIN — SODIUM CHLORIDE, POTASSIUM CHLORIDE, SODIUM LACTATE AND CALCIUM CHLORIDE 1000 ML: 600; 310; 30; 20 INJECTION, SOLUTION INTRAVENOUS at 13:32

## 2021-12-12 ASSESSMENT — ENCOUNTER SYMPTOMS
WEAKNESS: 0
SHORTNESS OF BREATH: 0
NAUSEA: 0
LIGHT-HEADEDNESS: 0
ADENOPATHY: 0
CONFUSION: 0
HEADACHES: 0
ARTHRALGIAS: 1
VOMITING: 0
BACK PAIN: 1
DYSURIA: 0
COLOR CHANGE: 0
CHILLS: 0
DIARRHEA: 0
SORE THROAT: 0
AGITATION: 0
MYALGIAS: 1
FEVER: 0
FREQUENCY: 0
COUGH: 0
ABDOMINAL PAIN: 0
EYE DISCHARGE: 0

## 2021-12-12 ASSESSMENT — MIFFLIN-ST. JEOR: SCORE: 1516.11

## 2021-12-12 NOTE — TELEPHONE ENCOUNTER
"Asks if [place] ED is really busy. Explained to pt the ED is typically always busy on Sat night as nothing else is open. Cannot determine wait times, etc for ED. \"when I go there why am I always the last one to get treated\". Advised pt the ED triages patients based on acuity of their symptoms. She should discuss any concerns she has w/ ED when she is there. Best source of care is your primary care clinic.   "

## 2021-12-12 NOTE — ED PROVIDER NOTES
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  She was seen in the emergency department 2 days ago by Dr. Butcher for low back pain related to sickle cell disease.  She was evaluated with physical exam and labs and treated with pain medications in accordance to her pain management plan with PO oxycodone, IV Toradol, IV ketamine.  Patient reported improvement with this regimen and was discharged to home.    This morning, she developed recurrence of diffuse generalized pain and low back pain and presents for evaluation.    She took her oxycodone 15 mg about an hour and a half ago.  She also used warm packs without adequate relief.  She denies any fever.  She has no nausea or vomiting.  She denies abdominal pain.  She has no dysuria or urinary frequency.  She denies melena or bright red blood per rectum.  She denies chest pain or shortness of breath.      Past Medical History  Past Medical History:   Diagnosis Date     Anxiety      Bleeding disorder (H)      Blood clotting disorder (H)      Cerebral infarction (H) 2015     Cognitive developmental delay     low IQ. 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 (H)     right hand contractures     Iron overload due to repeated red blood cell transfusions      Migraines      Multiple subsegmental pulmonary emboli without acute cor pulmonale (H) 02/01/2021     Oppositional defiant behavior      Superficial venous thrombosis of arm, right 03/25/2021     Uncomplicated asthma      Past Surgical History:   Procedure  Laterality Date     AS INSERT TUNNELED CV 2 CATH W/O PORT/PUMP       C BREAST REDUCTION (INCLUDES LIPO) TIER 3 Bilateral 04/23/2019     CHOLECYSTECTOMY       CV RIGHT HEART CATH MEASUREMENTS RECORDED N/A 11/18/2021    Procedure: Right Heart Cath;  Surgeon: Jackson Stauffer MD;  Location:  HEART CARDIAC CATH LAB     INSERT PORT VASCULAR ACCESS Left 4/21/2021    Procedure: INSERTION, VASCULAR ACCESS PORT (NOT SURE ON SIDE UNTIL REMOVAL);  Surgeon: Rajan More MD;  Location: [facility] OR     IR CHEST PORT PLACEMENT > 5 YRS OF AGE  4/21/2021     IR CVC NON TUNNEL LINE REMOVAL  5/6/2021     IR CVC NON TUNNEL PLACEMENT  04/07/2020     IR CVC NON TUNNEL PLACEMENT  4/30/2021     IR PORT REMOVAL LEFT  4/21/2021     REMOVE PORT VASCULAR ACCESS Left 4/21/2021    Procedure: REMOVAL, VASCULAR ACCESS PORT LEFT;  Surgeon: Rajan More MD;  Location: [facility] OR     REPAIR TENDON ELBOW Right 10/02/2019    Procedure: Right Elbow Flexor Lengthening, Flexor Pronator Slide Of Wrist and Finger, Thumb Adductor Lengthening;  Surgeon: Anai Franco MD;  Location: [facility] OR     TONSILLECTOMY Bilateral 10/02/2019    Procedure: Bilateral Tonsillectomy;  Surgeon: Farhana Guy MD;  Location: [facility] OR     acetaminophen (TYLENOL) 325 MG tablet  albuterol (PROAIR HFA/PROVENTIL HFA/VENTOLIN HFA) 108 (90 Base) MCG/ACT inhaler  albuterol (PROVENTIL) (2.5 MG/3ML) 0.083% neb solution  ARIPiprazole (ABILIFY) 2 MG tablet  budesonide-formoterol (SYMBICORT) 160-4.5 MCG/ACT Inhaler  deferasirox (JADENU) 360 MG tablet  diphenhydrAMINE (BENADRYL) 25 MG capsule  EPINEPHrine (ANY BX GENERIC EQUIV) 0.3 MG/0.3ML injection 2-pack  Hydroxyurea 1000 MG TABS  hydrOXYzine (ATARAX) 25 MG tablet  morphine (MS CONTIN) 15 MG CR tablet  naloxone (NARCAN) 4 MG/0.1ML nasal spray  omeprazole (PRILOSEC) 20 MG DR capsule  ondansetron (ZOFRAN) 8 MG tablet  oxyCODONE IR (ROXICODONE) 15 MG tablet  sertraline (ZOLOFT) 100 MG tablet  warfarin ANTICOAGULANT  "(COUMADIN) 5 MG tablet  warfarin ANTICOAGULANT (COUMADIN) 7.5 MG tablet  lidocaine-prilocaine (EMLA) 2.5-2.5 % external cream  medroxyPROGESTERone (DEPO-PROVERA) 150 MG/ML IM injection      Allergies   Allergen Reactions     Contrast Dye      Hives and breathing issues     Fish-Derived Products Hives     Seafood Hives     Diagnostic X-Ray Materials      Gadolinium      Family History  Family History   Problem Relation Age of Onset     Sickle Cell Trait Mother      Hypertension Mother      Asthma Mother      Sickle Cell Trait Father      Social History   Social History     Tobacco Use     Smoking status: Never Smoker     Smokeless tobacco: Never Used   Substance Use Topics     Alcohol use: Not Currently     Alcohol/week: 0.0 standard drinks     Drug use: Never      Past medical history, past surgical history, medications, allergies, family history, and social history were reviewed with the patient. No additional pertinent items.       Review of Systems   Constitutional: Negative for chills and fever.   HENT: Negative for congestion and sore throat.    Eyes: Negative for discharge.   Respiratory: Negative for cough and shortness of breath.    Cardiovascular: Negative for chest pain and leg swelling.   Gastrointestinal: Negative for abdominal pain, diarrhea, nausea and vomiting.   Genitourinary: Negative for dysuria and frequency.   Musculoskeletal: Positive for arthralgias, back pain and myalgias.   Skin: Negative for color change and rash.   Neurological: Negative for weakness, light-headedness and headaches.   Hematological: Negative for adenopathy.   Psychiatric/Behavioral: Negative for agitation, behavioral problems and confusion.       Physical Exam   BP: 136/79  Pulse: 118  Temp: 98.9  F (37.2  C)  Resp: 16  Height: 162.6 cm (5' 4\")  Weight: 77.1 kg (170 lb)  SpO2: 93 %  Physical Exam  Constitutional:       General: She is not in acute distress.     Appearance: She is well-developed.   HENT:      Head: " Normocephalic and atraumatic.   Eyes:      Conjunctiva/sclera: Conjunctivae normal.      Pupils: Pupils are equal, round, and reactive to light.   Neck:      Thyroid: No thyromegaly.      Trachea: No tracheal deviation.   Cardiovascular:      Rate and Rhythm: Normal rate and regular rhythm.      Heart sounds: Normal heart sounds. No murmur heard.      Pulmonary:      Effort: Pulmonary effort is normal. No respiratory distress.      Breath sounds: Normal breath sounds. No wheezing.   Chest:      Chest wall: No tenderness.   Abdominal:      General: There is no distension.      Palpations: Abdomen is soft.      Tenderness: There is no abdominal tenderness.   Musculoskeletal:         General: No tenderness.      Cervical back: Normal range of motion and neck supple.   Skin:     General: Skin is warm.      Findings: No rash.   Neurological:      Mental Status: She is alert and oriented to person, place, and time.      Sensory: No sensory deficit.   Psychiatric:         Behavior: Behavior normal.       ED Course      Procedures                   Results for orders placed or performed during the hospital encounter of 12/12/21   Comprehensive metabolic panel     Status: Abnormal   Result Value Ref Range    Sodium 139 133 - 144 mmol/L    Potassium 3.9 3.4 - 5.3 mmol/L    Chloride 111 (H) 94 - 109 mmol/L    Carbon Dioxide (CO2) 19 (L) 20 - 32 mmol/L    Anion Gap 9 3 - 14 mmol/L    Urea Nitrogen 3 (L) 7 - 30 mg/dL    Creatinine 0.52 0.52 - 1.04 mg/dL    Calcium 8.7 8.5 - 10.1 mg/dL    Glucose 118 (H) 70 - 99 mg/dL    Alkaline Phosphatase 78 40 - 150 U/L    AST 83 (H) 0 - 45 U/L    ALT 76 (H) 0 - 50 U/L    Protein Total 7.9 6.8 - 8.8 g/dL    Albumin 3.8 3.4 - 5.0 g/dL    Bilirubin Total 3.0 (H) 0.2 - 1.3 mg/dL    GFR Estimate >90 >60 mL/min/1.73m2   Reticulocyte count     Status: Abnormal   Result Value Ref Range    % Reticulocyte 26.9 (H) 0.5 - 2.0 %    Absolute Reticulocyte 0.706 (H) 0.025 - 0.095 10e6/uL   CBC with  platelets and differential     Status: Abnormal   Result Value Ref Range    WBC Count 11.2 (H) 4.0 - 11.0 10e3/uL    RBC Count 2.63 (L) 3.80 - 5.20 10e6/uL    Hemoglobin 8.2 (L) 11.7 - 15.7 g/dL    Hematocrit 23.8 (L) 35.0 - 47.0 %    MCV 91 78 - 100 fL    MCH 31.2 26.5 - 33.0 pg    MCHC 34.5 31.5 - 36.5 g/dL    RDW 26.7 (H) 10.0 - 15.0 %    Platelet Count 431 150 - 450 10e3/uL   Manual Differential     Status: Abnormal   Result Value Ref Range    % Neutrophils 71 %    % Lymphocytes 18 %    % Monocytes 4 %    % Eosinophils 6 %    % Basophils 1 %    NRBCs per 100 WBC 33 (H) <=0 %    Absolute Neutrophils 8.0 1.6 - 8.3 10e3/uL    Absolute Lymphocytes 2.0 0.8 - 5.3 10e3/uL    Absolute Monocytes 0.4 0.0 - 1.3 10e3/uL    Absolute Eosinophils 0.7 0.0 - 0.7 10e3/uL    Absolute Basophils 0.1 0.0 - 0.2 10e3/uL    Absolute NRBCs 3.7 (H) <=0.0 10e3/uL    RBC Morphology Confirmed RBC Indices     Platelet Assessment  Automated Count Confirmed. Platelet morphology is normal.     Automated Count Confirmed. Platelet morphology is normal.    Polychromasia Moderate (A) None Seen    Sickle Cells Marked (A) None Seen    Target Cells Slight (A) None Seen   CBC with platelets differential     Status: Abnormal    Narrative    The following orders were created for panel order CBC with platelets differential.  Procedure                               Abnormality         Status                     ---------                               -----------         ------                     CBC with platelets and d...[[id number]]  Abnormal            Final result               Manual Differential[[id number]]          Abnormal            Final result                 Please view results for these tests on the individual orders.     Medications   lactated ringers BOLUS 1,000 mL (0 mLs Intravenous Stopped 12/12/21 1400)        Assessments & Plan (with Medical Decision Making)   21-year-old female that has a known history of sickle cell disease who presents  for evaluation of low back pain starting this morning which is typical for her sickle cell crisis.  She has a care plan trying to decrease the amount of parenteral opioids she gets.  She did try oxycodone at home with inadequate relief.  She has no signs of infection.    On physical exam, patient has tachycardia at 118.  Other vital signs are normal.  She has a nonsurgical abdomen.    IV was established and bloods were drawn.  She will be given fluids, ketorolac, and Zofran.  She can also receive oxycodone, however she has not had enough time in between her last dose.    Patient's comprehensive metabolic profile shows slightly elevated LFTs and a bicarb of 19.  Patient's percent reticulocyte count is 26.9.  Hemoglobin is 8.2 which is at her baseline.  White count is 11.2.    After her initial treatment with her pain regimen, she still had significant pain.    Patient received 2 doses of ketamine and her oral dose of oxycodone.    She was feeling better and felt comfortable with discharge.  It does not seem like there is an acute infection causing her sickle cell crisis.  Her vital signs remained normal.    I have reviewed the nursing notes. I have reviewed the findings, diagnosis, plan and need for follow up with the patient.    Discharge Medication List as of 12/12/2021  5:19 PM          Final diagnoses:   Sickle cell pain crisis (H)       --  Ifeanyi Gaitan  [facility] EMERGENCY DEPARTMENT  12/12/2021     Ifeanyi Gaitan MD  12/13/21 0891

## 2021-12-12 NOTE — ED TRIAGE NOTES
Pt arrived ambulatory from home w/ worsening sickle cell pain. States earlier this am when she first noticed generalized pain and lower back pain. Endorses taking home Rx for pain ~1130, no relief.

## 2021-12-13 ENCOUNTER — TELEPHONE (OUTPATIENT)
Dept: ONCOLOGY | Facility: CLINIC | Age: 21
End: 2021-12-13
Payer: COMMERCIAL

## 2021-12-13 ENCOUNTER — ANTICOAGULATION THERAPY VISIT (OUTPATIENT)
Dept: ANTICOAGULATION | Facility: CLINIC | Age: 21
End: 2021-12-13
Payer: COMMERCIAL

## 2021-12-13 ENCOUNTER — INFUSION THERAPY VISIT (OUTPATIENT)
Dept: ONCOLOGY | Facility: CLINIC | Age: 21
End: 2021-12-13
Attending: PEDIATRICS
Payer: COMMERCIAL

## 2021-12-13 ENCOUNTER — PATIENT OUTREACH (OUTPATIENT)
Dept: CARE COORDINATION | Facility: CLINIC | Age: 21
End: 2021-12-13
Payer: COMMERCIAL

## 2021-12-13 VITALS
RESPIRATION RATE: 18 BRPM | OXYGEN SATURATION: 95 % | DIASTOLIC BLOOD PRESSURE: 87 MMHG | SYSTOLIC BLOOD PRESSURE: 132 MMHG | TEMPERATURE: 98.5 F | HEART RATE: 110 BPM

## 2021-12-13 DIAGNOSIS — G81.10 SPASTIC HEMIPLEGIA, UNSPECIFIED ETIOLOGY, UNSPECIFIED LATERALITY (H): Primary | ICD-10-CM

## 2021-12-13 DIAGNOSIS — I27.82 CHRONIC PULMONARY EMBOLISM WITHOUT ACUTE COR PULMONALE, UNSPECIFIED PULMONARY EMBOLISM TYPE (H): Primary | ICD-10-CM

## 2021-12-13 DIAGNOSIS — D57.00 SICKLE CELL PAIN CRISIS (H): ICD-10-CM

## 2021-12-13 PROCEDURE — 96374 THER/PROPH/DIAG INJ IV PUSH: CPT

## 2021-12-13 PROCEDURE — 258N000003 HC RX IP 258 OP 636: Performed by: PEDIATRICS

## 2021-12-13 PROCEDURE — 96361 HYDRATE IV INFUSION ADD-ON: CPT

## 2021-12-13 PROCEDURE — 96376 TX/PRO/DX INJ SAME DRUG ADON: CPT

## 2021-12-13 PROCEDURE — 250N000011 HC RX IP 250 OP 636: Performed by: PEDIATRICS

## 2021-12-13 RX ORDER — HEPARIN SODIUM,PORCINE 10 UNIT/ML
5 VIAL (ML) INTRAVENOUS
Status: CANCELLED | OUTPATIENT
Start: 2022-01-01

## 2021-12-13 RX ORDER — MORPHINE SULFATE 2 MG/ML
2 INJECTION, SOLUTION INTRAMUSCULAR; INTRAVENOUS
Status: DISCONTINUED | OUTPATIENT
Start: 2021-12-13 | End: 2021-12-13 | Stop reason: HOSPADM

## 2021-12-13 RX ORDER — DIPHENHYDRAMINE HCL 25 MG
25 CAPSULE ORAL
Status: CANCELLED
Start: 2022-01-01

## 2021-12-13 RX ORDER — ONDANSETRON 8 MG/1
8 TABLET, FILM COATED ORAL
Status: CANCELLED
Start: 2022-01-01

## 2021-12-13 RX ORDER — HEPARIN SODIUM (PORCINE) LOCK FLUSH IV SOLN 100 UNIT/ML 100 UNIT/ML
5 SOLUTION INTRAVENOUS
Status: DISCONTINUED | OUTPATIENT
Start: 2021-12-13 | End: 2021-12-13 | Stop reason: HOSPADM

## 2021-12-13 RX ORDER — HEPARIN SODIUM (PORCINE) LOCK FLUSH IV SOLN 100 UNIT/ML 100 UNIT/ML
5 SOLUTION INTRAVENOUS
Status: CANCELLED | OUTPATIENT
Start: 2022-01-01

## 2021-12-13 RX ORDER — MORPHINE SULFATE 2 MG/ML
2 INJECTION, SOLUTION INTRAMUSCULAR; INTRAVENOUS
Status: CANCELLED
Start: 2022-01-01

## 2021-12-13 RX ADMIN — MORPHINE SULFATE 2 MG: 2 INJECTION, SOLUTION INTRAMUSCULAR; INTRAVENOUS at 10:15

## 2021-12-13 RX ADMIN — MORPHINE SULFATE 2 MG: 2 INJECTION, SOLUTION INTRAMUSCULAR; INTRAVENOUS at 11:16

## 2021-12-13 RX ADMIN — Medication 5 ML: at 12:33

## 2021-12-13 RX ADMIN — DEXTROSE AND SODIUM CHLORIDE 500 ML: 5; 450 INJECTION, SOLUTION INTRAVENOUS at 10:12

## 2021-12-13 RX ADMIN — MORPHINE SULFATE 2 MG: 2 INJECTION, SOLUTION INTRAMUSCULAR; INTRAVENOUS at 12:17

## 2021-12-13 NOTE — PROGRESS NOTES
"Infusion Nursing Note:  Jennifer Cervantes presents today for IVF/pain medication.    Patient seen by provider today: No   present during visit today: Not Applicable.    Note: Patient reports constant non radiating lower back pain with PS 9/10. States \" this is my usual sickle cell crisis pain.\" Denies fever/chills. Patient refused PRN medication. Denies nausea/vomiting. Denies shortness of breath nor chest and abdominal discomfort. No new concerns made. Otherwise well.     Upon discharge patient had total of 3 doses of Morphine with PS 5/10. Patient agreed to be discharge. Patient verbalized knowledge on when and where to call if symptoms worsen. No new complaints made.       Intravenous Access:  Implanted Port.    Treatment Conditions:  Not Applicable.      Post Infusion Assessment:  Patient tolerated infusion without incident.  Blood return noted pre and post infusion.  Site patent and intact, free from redness, edema or discomfort.  No evidence of extravasations.  Access discontinued per protocol.       Discharge Plan:   Patient declined prescription refills.  Discharge instructions reviewed with: Patient.  Patient and/or family verbalized understanding of discharge instructions and all questions answered.  AVS to patient via [employer].  Patient will return 12/20/21 for next appointment.   Patient discharged in stable condition accompanied by: self.  Departure Mode: Ambulatory.      GLORIA YANCEY RN                    "

## 2021-12-13 NOTE — CONFIDENTIAL NOTE
"Pt called in to triage requesting IVF pain meds for lower back pain rated 9/10 x  \"awhile\". Stated last took prn MS Contin and oxycodone at 6 am this morning without relief. Denied any fevers, chills, cough, sob, chest pain, numbness or tingling or other symptoms not typical of sickle cell pain.   Pt's last infusion was 12/9/21, was in the ED on 12/10 and 12/12 last clinic visit 11/30/21 with follow-up on 12/20/21 with Dr Duncan .   Patient states they do not have own ride and it will take 60 minutes long to get to cancer clinic.   Pt denied being out of home medications and needing any refills today.     If you do not hear from the infusion center by 2pm then you will not be able to get in for an infusion today.   Please note, if you are late for your appt, you risk losing your infusion appt as it may delay another patient's infusion who arrived on time.     7:50 paged Andrei HIGGINS  8:40 paged Andrei HIGGINS for 2nd time   8:42 Andrei Machado returned call.   Ok for Jennifer to come in for IVF/Pain no labs needed as she just had labs done in the ER.     Ange has a 10:00 am appointment available     Social work aware and is setting up ride.     Message sent to  to schedule appointment on 12/13/21 at 10:00 am for IVF/pain no labs needed.   "

## 2021-12-13 NOTE — PROGRESS NOTES
Social Work Note: Telephone Call  Oncology Clinic     Data/Intervention:  Patient Name:  Jennifer Cervantes  /Age: 2000, 21 years old     Call From: [facility]        Reason for Call:  Transportation     Assessment:   called [employer] to arrange ride. Transportation arranged  for patient from home in about 15-20 minutes.  Patient will need to call when ready for return ride home [phone].      Plan:  Patient is aware of the transportation plan.  available to assist with any other needs.      CARLOS Chavez,[facility]  Hematology/Oncology Social Worker  Phone:[phone] Pager: [phone]

## 2021-12-13 NOTE — PATIENT INSTRUCTIONS
Contact Numbers  [facility]: [phone]    After Hours:  [phone]  Triage: [phone]    Please call the [facility] Triage line if you experience a temperature greater than or equal to 100.5, shaking chills, have uncontrolled nausea, vomiting and/or diarrhea, dizziness, shortness of breath, chest pain, bleeding, unexplained bruising, or if you have any other new/concerning symptoms, questions or concerns.     If it is after hours, weekends, or holidays, please call the main hospital  at  [phone] and ask to speak to the Oncology doctor on call.     If you are having any concerning symptoms or wish to speak to a provider before your next infusion visit, please call your care coordinator or triage to notify them so we can adequately serve you.     If you need a refill on a narcotic prescription or other medication, please call triage before your infusion appointment.         December 2021 Sunday Monday Tuesday Wednesday Thursday Friday Saturday                  1    BMT INFUSION 2 HR (120 MIN)  12:30 PM   (120 min.)    BMT INFUSION NURSE   [facility] [place]    XR CHEST 1 VIEW   4:00 PM   (20 min.)   [id number]   [facility] [place] 2    LAB INR   9:15 AM   (15 min.)    LAB   [facility] [place] 3    Admission   5:50 AM   Deo Schmid MD   [facility] Emergency Department   (Discharge: 12/3/2021) 4       5    Admission   5:43 AM   Andrew Chou MD   [facility] Emergency Department   (Discharge: 12/5/2021)    US LWR EXT VENOUS DUPLEX LEFT   6:35 AM   (30 min.)   [email or web address]   [facility] Imaging 6    VIDEO VISIT RETURN   9:15 AM   (30 min.)   Suraj Case MD   [facility] [place] 7    BMT INFUSION 2 HR (120 MIN)   1:30 PM   (120 min.)    BMT INFUSION NURSE   [facility]  [facility] [place] 8     9    SPEC INFUSION 2 HR (120 MIN)   2:00 PM   (120 min.)   [facility] INFUSION NURSE   [facility] 10    Admission   4:15 PM   [facility] Emergency Department   (Discharge: 12/10/2021) 11       12    Admission  12:45 PM   [facility] Emergency Department   (Discharge: 12/12/2021) 13    ONC INFUSION 2 HR (120 MIN)  10:00 AM   (120 min.)   [facility] ONC INFUSION NURSE   [facility] 14     15    UMP RETURN   2:40 PM   (40 min.)   Eric Alvarado MD   [facility] 16     17     18       19     20    RETURN  12:15 PM   (30 min.)   Eric Duncan MD   [facility] 21    [facility] NEW PRIMARY PULMONARY   3:15 PM   (60 min.)   Gaetano Mccormick MD   [facility] 22    P RETURN ARRHYTHMIA   9:45 AM   (30 min.)   Isai Horn MD   [facility]    LAB CENTRAL  11:45 AM   (15 min.)    [facility] DRAW   [facility]    ONC INFUSION 3 HR (180 MIN)  12:30 PM   (180 min.)    ONC INFUSION NURSE   [facility] 23     24     25       26     27     28     29     30     31                      January 2022 Sunday Monday Tuesday Wednesday Thursday Friday Saturday                                 1       2     3     4     5     6    NEW HEADACHE   4:15 PM   (60 min.)   Lidia Shaffer APRN CNP   [facility] 7     8       9     10     11     12    RETURN  10:45 AM   (60 min.)   Katherine Pierce MD   [facility] 13     14     15       16     17     18     19     20     21     22       23     24     25     26     27     28     29       30     31                                              Lab Results:  No results found for this or  any previous visit (from the past 12 hour(s)).

## 2021-12-13 NOTE — PROGRESS NOTES
ANTICOAGULATION MANAGEMENT     Jennifer Cervantes 21 year old female is on warfarin with subtherapeutic INR result. (Goal INR 2.0-3.0)    Recent labs: (last 7 days)     12/10/21  1555   INR 1.21*       ASSESSMENT     Source(s): Chart Review and Patient/Caregiver Call       Warfarin doses taken: Warfarin taken as instructed and Patient cofirms that she has taken 10mg daily without missed doses.     Diet: No new diet changes identified    New illness, injury, or hospitalization: Yes: Patient was in the ER on 12/10 and 12/12 for sickle cell pain    Medication/supplement changes: None noted    Signs or symptoms of bleeding or clotting: No    Previous INR: Subtherapeutic    Additional findings: None     PLAN     Recommended plan for no diet, medication or health factor changes affecting INR     Dosing Instructions:  Increase your warfarin dose (18% change) with next INR in 3 days       Summary  As of 12/13/2021    Full warfarin instructions:  12/13: 15 mg; Otherwise 10 mg every Mon, Thu, Sat; 12.5 mg all other days   Next INR check:  12/16/2021             Telephone call with Jennifer who verbalizes understanding and agrees to plan and who agrees to plan and repeated back plan correctly  Detailed voice message left for Jennifer with dosing instructions and follow up date.     Patient offered & declined to schedule next visit    Education provided: Goal range and significance of current result, Importance of therapeutic range, Importance of following up at instructed interval and Importance of taking warfarin as instructed    Plan made per [facility] anticoagulation protocol    Gita Khan RN  Anticoagulation Clinic  12/13/2021    _______________________________________________________________________     Anticoagulation Episode Summary     Current INR goal:  2.0-3.0   TTR:  0.0 % (1.3 wk)   Target end date:  Indefinite   Send INR reminders to:  [facility]    Indications    Chronic pulmonary embolism without acute cor  pulmonale  unspecified pulmonary embolism type (H) [I27.82]           Comments:  [facility] center [phone]         Anticoagulation Care Providers     Provider Role Specialty Phone number    Eric Duncan MD Referring Pediatric Hematology-Oncology [phone]

## 2021-12-14 ENCOUNTER — TELEPHONE (OUTPATIENT)
Dept: ONCOLOGY | Facility: CLINIC | Age: 21
End: 2021-12-14
Payer: COMMERCIAL

## 2021-12-14 ENCOUNTER — INFUSION THERAPY VISIT (OUTPATIENT)
Dept: TRANSPLANT | Facility: CLINIC | Age: 21
End: 2021-12-14
Attending: PEDIATRICS
Payer: COMMERCIAL

## 2021-12-14 ENCOUNTER — PATIENT OUTREACH (OUTPATIENT)
Dept: CARE COORDINATION | Facility: CLINIC | Age: 21
End: 2021-12-14
Payer: COMMERCIAL

## 2021-12-14 VITALS
DIASTOLIC BLOOD PRESSURE: 75 MMHG | HEART RATE: 101 BPM | SYSTOLIC BLOOD PRESSURE: 133 MMHG | OXYGEN SATURATION: 98 % | RESPIRATION RATE: 16 BRPM | TEMPERATURE: 98.5 F

## 2021-12-14 DIAGNOSIS — G81.10 SPASTIC HEMIPLEGIA, UNSPECIFIED ETIOLOGY, UNSPECIFIED LATERALITY (H): Primary | ICD-10-CM

## 2021-12-14 DIAGNOSIS — D57.00 SICKLE CELL PAIN CRISIS (H): ICD-10-CM

## 2021-12-14 PROCEDURE — 250N000013 HC RX MED GY IP 250 OP 250 PS 637: Performed by: PEDIATRICS

## 2021-12-14 PROCEDURE — 96375 TX/PRO/DX INJ NEW DRUG ADDON: CPT

## 2021-12-14 PROCEDURE — 96365 THER/PROPH/DIAG IV INF INIT: CPT

## 2021-12-14 PROCEDURE — 258N000003 HC RX IP 258 OP 636: Performed by: PEDIATRICS

## 2021-12-14 PROCEDURE — 250N000011 HC RX IP 250 OP 636: Performed by: PEDIATRICS

## 2021-12-14 PROCEDURE — 96366 THER/PROPH/DIAG IV INF ADDON: CPT

## 2021-12-14 RX ORDER — DIPHENHYDRAMINE HCL 25 MG
25 CAPSULE ORAL
Status: CANCELLED
Start: 2022-01-01

## 2021-12-14 RX ORDER — HEPARIN SODIUM (PORCINE) LOCK FLUSH IV SOLN 100 UNIT/ML 100 UNIT/ML
5 SOLUTION INTRAVENOUS
Status: DISCONTINUED | OUTPATIENT
Start: 2021-12-14 | End: 2021-12-14 | Stop reason: HOSPADM

## 2021-12-14 RX ORDER — ONDANSETRON 8 MG/1
8 TABLET, FILM COATED ORAL
Status: CANCELLED
Start: 2022-01-01

## 2021-12-14 RX ORDER — HEPARIN SODIUM,PORCINE 10 UNIT/ML
5 VIAL (ML) INTRAVENOUS
Status: CANCELLED | OUTPATIENT
Start: 2022-01-01

## 2021-12-14 RX ORDER — MORPHINE SULFATE 2 MG/ML
2 INJECTION, SOLUTION INTRAMUSCULAR; INTRAVENOUS
Status: DISCONTINUED | OUTPATIENT
Start: 2021-12-14 | End: 2021-12-14 | Stop reason: HOSPADM

## 2021-12-14 RX ORDER — ONDANSETRON 8 MG/1
8 TABLET, ORALLY DISINTEGRATING ORAL
Status: COMPLETED | OUTPATIENT
Start: 2021-12-14 | End: 2021-12-14

## 2021-12-14 RX ORDER — MORPHINE SULFATE 2 MG/ML
2 INJECTION, SOLUTION INTRAMUSCULAR; INTRAVENOUS
Status: CANCELLED
Start: 2022-01-01

## 2021-12-14 RX ORDER — ONDANSETRON 8 MG/1
8 TABLET, FILM COATED ORAL
Status: DISCONTINUED | OUTPATIENT
Start: 2021-12-14 | End: 2021-12-14

## 2021-12-14 RX ORDER — HEPARIN SODIUM (PORCINE) LOCK FLUSH IV SOLN 100 UNIT/ML 100 UNIT/ML
5 SOLUTION INTRAVENOUS
Status: CANCELLED | OUTPATIENT
Start: 2022-01-01

## 2021-12-14 RX ORDER — DIPHENHYDRAMINE HCL 25 MG
25 CAPSULE ORAL
Status: COMPLETED | OUTPATIENT
Start: 2021-12-14 | End: 2021-12-14

## 2021-12-14 RX ADMIN — MORPHINE SULFATE 2 MG: 2 INJECTION, SOLUTION INTRAMUSCULAR; INTRAVENOUS at 09:26

## 2021-12-14 RX ADMIN — MORPHINE SULFATE 2 MG: 2 INJECTION, SOLUTION INTRAMUSCULAR; INTRAVENOUS at 11:39

## 2021-12-14 RX ADMIN — MORPHINE SULFATE 2 MG: 2 INJECTION, SOLUTION INTRAMUSCULAR; INTRAVENOUS at 10:38

## 2021-12-14 RX ADMIN — DEXTROSE AND SODIUM CHLORIDE 500 ML: 5; 450 INJECTION, SOLUTION INTRAVENOUS at 09:24

## 2021-12-14 RX ADMIN — Medication 5 ML: at 11:47

## 2021-12-14 RX ADMIN — ONDANSETRON 8 MG: 8 TABLET, ORALLY DISINTEGRATING ORAL at 09:25

## 2021-12-14 RX ADMIN — DIPHENHYDRAMINE HYDROCHLORIDE 25 MG: 25 CAPSULE ORAL at 09:25

## 2021-12-14 ASSESSMENT — PAIN SCALES - GENERAL: PAINLEVEL: EXTREME PAIN (9)

## 2021-12-14 NOTE — PROGRESS NOTES
Infusion Nursing Note:  Jennifer Cervantes presents today for IVF and pain medications.    Patient seen by provider today: No   present during visit today: Not Applicable.    Note:     Pt was given 500 ml D5.45 over 2 hours.  Pt received benadryl and zofran as premeds, and 2 mg IV morphine every hour x 3 doses.    Intravenous Access:    Implanted Port.    Treatment Conditions:  No labs ordered.      Post Infusion Assessment:  Patient tolerated infusion without incident.  Blood return noted pre and post infusion.  Site patent and intact, free from redness, edema or discomfort.  No evidence of extravasations.  Access discontinued per protocol.       Discharge Plan:   Copy of AVS reviewed with patient   Patient discharged in stable condition accompanied by: self.  Departure Mode: Ambulatory.      Jamaica Napoles RN

## 2021-12-14 NOTE — TELEPHONE ENCOUNTER
"Pt called in to triage requesting IVF pain meds for pain \"all over\" rated 9/10 x not sure how many days. Stated last took prn MS contin and oxycodone this morning  6 a.m. without relief. Denied any fevers, chills, cough, sob, chest pain, numbness or tingling or other symptoms not typical of sickle cell pain. Pt's last infusion was 12/13/21, last clinic visit 11/30/21 with follow-up on 12/20/21 with Dr. Duncan.   Pt denied being out of home medications and needing any refills today.     If you do not hear from the infusion center by 2pm then you will not be able to get in for an infusion today.   Please note, if you are late for your appt, you risk losing your infusion appt as it may delay another patient's infusion who arrived on time.     Ride Needed: Yes  Distance: 25 min.    Pt qualifies per treatment plan guidelines. Today will be the third day in a row for IVF/pain meds if infusion has an opening today.    BMT has opening at 0900.  Contacted Jennifer who is able to come at that time.  Contacted [employer] via Teams and Page to assist with ride.  IB sent to scheduling.  Infusion notified that pt confirmed she can come.    Routed to Care Team.          "

## 2021-12-14 NOTE — LETTER
12/14/2021         RE: Jennifer Cervantes  [address]  [facility] [address]        Dear Colleague,    Thank you for referring your patient, Jennifer Cervantes, to the [facility] BLOOD AND MARROW TRANSPLANT PROGRAM [place]. Please see a copy of my visit note below.    Infusion Nursing Note:  Jennifer Cervantes presents today for IVF and pain medications.    Patient seen by provider today: No   present during visit today: Not Applicable.    Note:     Pt was given 500 ml D5.45 over 2 hours.  Pt received benadryl and zofran as premeds, and 2 mg IV morphine every hour x 3 doses.    Intravenous Access:    Implanted Port.    Treatment Conditions:  No labs ordered.      Post Infusion Assessment:  Patient tolerated infusion without incident.  Blood return noted pre and post infusion.  Site patent and intact, free from redness, edema or discomfort.  No evidence of extravasations.  Access discontinued per protocol.       Discharge Plan:   Copy of AVS reviewed with patient   Patient discharged in stable condition accompanied by: self.  Departure Mode: Ambulatory.      Jamaica Napoles RN                          Again, thank you for allowing me to participate in the care of your patient.        Sincerely,        [facility]

## 2021-12-14 NOTE — PROGRESS NOTES
Social Work Note: Telephone Call  Oncology Clinic     Data/Intervention:  Patient Name:  Jennifer Cervantes DOB/Age: 2000     Call From: [facility] T[facility]        Reason for Call:  Transportation      Assessment:   called [employer] ([phone]) to arrange ride. [employer] ([phone]) arranged  for patient from home with a will call return ride. Patient will need to call when ready for return ride home.      Plan:  Patient is aware of the transportation plan.  available to assist with any other needs.      CARLOS Chavez,SW  Hematology/Oncology Social Worker  Phone:[phone] Pager: [phone]

## 2021-12-15 ENCOUNTER — PATIENT OUTREACH (OUTPATIENT)
Dept: CARE COORDINATION | Facility: CLINIC | Age: 21
End: 2021-12-15
Payer: COMMERCIAL

## 2021-12-15 ENCOUNTER — TELEPHONE (OUTPATIENT)
Dept: ONCOLOGY | Facility: CLINIC | Age: 21
End: 2021-12-15
Payer: COMMERCIAL

## 2021-12-15 ENCOUNTER — INFUSION THERAPY VISIT (OUTPATIENT)
Dept: INFUSION THERAPY | Facility: CLINIC | Age: 21
End: 2021-12-15
Attending: PEDIATRICS
Payer: COMMERCIAL

## 2021-12-15 VITALS
RESPIRATION RATE: 20 BRPM | HEART RATE: 105 BPM | SYSTOLIC BLOOD PRESSURE: 118 MMHG | DIASTOLIC BLOOD PRESSURE: 72 MMHG | TEMPERATURE: 98.3 F | OXYGEN SATURATION: 95 %

## 2021-12-15 DIAGNOSIS — G81.10 SPASTIC HEMIPLEGIA, UNSPECIFIED ETIOLOGY, UNSPECIFIED LATERALITY (H): Primary | ICD-10-CM

## 2021-12-15 DIAGNOSIS — D57.00 SICKLE CELL PAIN CRISIS (H): ICD-10-CM

## 2021-12-15 PROCEDURE — 96361 HYDRATE IV INFUSION ADD-ON: CPT

## 2021-12-15 PROCEDURE — 96374 THER/PROPH/DIAG INJ IV PUSH: CPT

## 2021-12-15 PROCEDURE — 96376 TX/PRO/DX INJ SAME DRUG ADON: CPT

## 2021-12-15 PROCEDURE — 250N000011 HC RX IP 250 OP 636: Performed by: PEDIATRICS

## 2021-12-15 PROCEDURE — 258N000003 HC RX IP 258 OP 636: Performed by: PEDIATRICS

## 2021-12-15 RX ORDER — HEPARIN SODIUM (PORCINE) LOCK FLUSH IV SOLN 100 UNIT/ML 100 UNIT/ML
5 SOLUTION INTRAVENOUS
Status: CANCELLED | OUTPATIENT
Start: 2022-01-01

## 2021-12-15 RX ORDER — HEPARIN SODIUM,PORCINE 10 UNIT/ML
5 VIAL (ML) INTRAVENOUS
Status: CANCELLED | OUTPATIENT
Start: 2022-01-01

## 2021-12-15 RX ORDER — ONDANSETRON 8 MG/1
8 TABLET, FILM COATED ORAL
Status: CANCELLED
Start: 2022-01-01

## 2021-12-15 RX ORDER — DIPHENHYDRAMINE HCL 25 MG
25 CAPSULE ORAL
Status: CANCELLED
Start: 2022-01-01

## 2021-12-15 RX ORDER — MORPHINE SULFATE 2 MG/ML
2 INJECTION, SOLUTION INTRAMUSCULAR; INTRAVENOUS
Status: DISCONTINUED | OUTPATIENT
Start: 2021-12-15 | End: 2021-12-15 | Stop reason: HOSPADM

## 2021-12-15 RX ORDER — MORPHINE SULFATE 2 MG/ML
2 INJECTION, SOLUTION INTRAMUSCULAR; INTRAVENOUS
Status: CANCELLED
Start: 2022-01-01

## 2021-12-15 RX ORDER — HEPARIN SODIUM (PORCINE) LOCK FLUSH IV SOLN 100 UNIT/ML 100 UNIT/ML
5 SOLUTION INTRAVENOUS
Status: DISCONTINUED | OUTPATIENT
Start: 2021-12-15 | End: 2021-12-15 | Stop reason: HOSPADM

## 2021-12-15 RX ADMIN — DEXTROSE AND SODIUM CHLORIDE 500 ML: 5; 450 INJECTION, SOLUTION INTRAVENOUS at 13:24

## 2021-12-15 RX ADMIN — MORPHINE SULFATE 2 MG: 2 INJECTION, SOLUTION INTRAMUSCULAR; INTRAVENOUS at 15:18

## 2021-12-15 RX ADMIN — MORPHINE SULFATE 2 MG: 2 INJECTION, SOLUTION INTRAMUSCULAR; INTRAVENOUS at 13:24

## 2021-12-15 RX ADMIN — MORPHINE SULFATE 2 MG: 2 INJECTION, SOLUTION INTRAMUSCULAR; INTRAVENOUS at 14:25

## 2021-12-15 RX ADMIN — HEPARIN 5 ML: 100 SYRINGE at 15:21

## 2021-12-15 NOTE — PROGRESS NOTES
Social Work Note: Telephone Call  Oncology Clinic     Data/Intervention:  Patient Name:  Jennifer Cervantes DOB/Age: 2000, 21 years old     Call From: Triage RN        Reason for Call:  Transportation     Assessment:   called [employer] to arrange ride through patient's insurance. [employer] arranged  for patient from home with [employer] ([phone]).  Patient will need to call when ready for return ride home.      Plan:  Patient is aware of the transportation plan.  available to assist with any other needs.      CARLOS Chavez,SW  Hematology/Oncology Social Worker  Phone:[phone] Pager: [phone]

## 2021-12-15 NOTE — CONFIDENTIAL NOTE
"Pt called in to triage requesting IVF pain meds for lower back pain rated 9/10 x  \"awhile\". Got better after infusion yesterday but then it started back up again during the night. Stated last took prn MS Contin and oxycodone at 6 am this morning without relief. Denied any fevers, chills, cough, sob, chest pain, numbness or tingling or other symptoms not typical of sickle cell pain.   Pt's last infusion was 12/14/21, last clinic visit 11/30/21 with follow-up on 12/20/21 with Dr Duncan .   Patient states they do not have own ride and it will take 60 minutes long to get to cancer clinic.   Pt denied being out of home medications and needing any refills today.      If you do not hear from the infusion center by 2pm then you will not be able to get in for an infusion today.     Please note, if you are late for your appt, you risk losing your infusion appt as it may delay another patient's infusion who arrived on time.     7:41 [facility] can take Jennifer at 1300 for infusion.     Jennifer confirms that she can make it for a 1300 infusion.     Message sent to social workers to please assist in setting up a ride.     Message sent to  to schedule appointment.    "

## 2021-12-15 NOTE — LETTER
12/15/2021         RE: Jennifer Cervantes  [address]  [place] [address]        Dear Colleague,    Thank you for referring your patient, Jennifer Cervantes, to the [facility]. Please see a copy of my visit note below.    Infusion Nursing Note:  Jennifer Cervantes presents today for IVF and pain meds.    Patient seen by provider today: No   present during visit today: Not Applicable.    Note:   IVF over approximately 2 hours.  Morphine IV X 3 doses. Patient initially reported pain 9/10. Pain 6/10 at discharge. Patient states this is tolerable.  Patient declines PRN zofran and benadryl.    Intravenous Access:  Implanted Port.    Treatment Conditions:  Not Applicable.    Post Infusion Assessment:  Patient tolerated infusion without incident.  Blood return noted pre and post infusion.  Site patent and intact, free from redness, edema or discomfort.  No evidence of extravasations.  Access discontinued per protocol.     Discharge Plan:   Discharge instructions reviewed with: Patient.  Patient and/or family verbalized understanding of discharge instructions and all questions answered.  AVS to patient via [employer].  Patient will return PRN  Patient discharged in stable condition accompanied by: self.  Departure Mode: Ambulatory.    Administrations This Visit     dextrose 5% and 0.45% NaCl BOLUS     Admin Date  12/15/2021 Action  New Bag Dose  500 mL Rate  250 mL/hr Route  Intravenous Administered By  Claudia Landry, JOE          heparin 100 UNIT/ML injection 5 mL     Admin Date  12/15/2021 Action  Given Dose  5 mL Route  Intracatheter Administered By  Claudia Landry, RN          morphine (PF) injection 2 mg     Admin Date  12/15/2021 Action  Given Dose  2 mg Route  Intravenous Administered By  Claudia Landry, JOE           Admin Date  12/15/2021 Action  Given Dose  2 mg Route  Intravenous Administered By  Claudia Landry, JOE           Admin Date  12/15/2021  Action  Given Dose  2 mg Route  Intravenous Administered By  Claudia Landry, JOE Landry, JOE                        Again, thank you for allowing me to participate in the care of your patient.        Sincerely,        [facility]

## 2021-12-15 NOTE — PATIENT INSTRUCTIONS
Dear Jennifer Cervantes    Thank you for choosing [facility] for your infusion.  The following information is a summary of our appointment as well as important reminders.      We look forward in seeing you on your next appointment here at [facility]  Please don t hesitate to call us at [phone] to reschedule any of your appointments or to speak with one of the [facility] registered nurses.  It was a pleasure taking care of you today.    Sincerely,    [facility]  [facility]  [address] [place] [address]  [address]  Phone:  [phone]

## 2021-12-15 NOTE — PROGRESS NOTES
Infusion Nursing Note:  Jennifer Cervantes presents today for IVF and pain meds.    Patient seen by provider today: No   present during visit today: Not Applicable.    Note:   IVF over approximately 2 hours.  Morphine IV X 3 doses. Patient initially reported pain 9/10. Pain 6/10 at discharge. Patient states this is tolerable.  Patient declines PRN zofran and benadryl.    Intravenous Access:  Implanted Port.    Treatment Conditions:  Not Applicable.    Post Infusion Assessment:  Patient tolerated infusion without incident.  Blood return noted pre and post infusion.  Site patent and intact, free from redness, edema or discomfort.  No evidence of extravasations.  Access discontinued per protocol.     Discharge Plan:   Discharge instructions reviewed with: Patient.  Patient and/or family verbalized understanding of discharge instructions and all questions answered.  AVS to patient via "[employer]HART.  Patient will return PRN  Patient discharged in stable condition accompanied by: self.  Departure Mode: Ambulatory.    Administrations This Visit     dextrose 5% and 0.45% NaCl BOLUS     Admin Date  12/15/2021 Action  New Bag Dose  500 mL Rate  250 mL/hr Route  Intravenous Administered By  Claudia Landry RN          heparin 100 UNIT/ML injection 5 mL     Admin Date  12/15/2021 Action  Given Dose  5 mL Route  Intracatheter Administered By  Claudia Landry RN          morphine (PF) injection 2 mg     Admin Date  12/15/2021 Action  Given Dose  2 mg Route  Intravenous Administered By  Claudia Landry RN           Admin Date  12/15/2021 Action  Given Dose  2 mg Route  Intravenous Administered By  Claudia Landry RN           Admin Date  12/15/2021 Action  Given Dose  2 mg Route  Intravenous Administered By  Claudia Landry RN Julie Backhaus, RN

## 2021-12-16 ENCOUNTER — NURSE TRIAGE (OUTPATIENT)
Dept: ONCOLOGY | Facility: CLINIC | Age: 21
End: 2021-12-16

## 2021-12-16 ENCOUNTER — INFUSION THERAPY VISIT (OUTPATIENT)
Dept: INFUSION THERAPY | Facility: CLINIC | Age: 21
End: 2021-12-16
Attending: PEDIATRICS
Payer: COMMERCIAL

## 2021-12-16 ENCOUNTER — TELEPHONE (OUTPATIENT)
Dept: ONCOLOGY | Facility: CLINIC | Age: 21
End: 2021-12-16
Payer: COMMERCIAL

## 2021-12-16 ENCOUNTER — PATIENT OUTREACH (OUTPATIENT)
Dept: CARE COORDINATION | Facility: CLINIC | Age: 21
End: 2021-12-16
Payer: COMMERCIAL

## 2021-12-16 VITALS
DIASTOLIC BLOOD PRESSURE: 71 MMHG | RESPIRATION RATE: 22 BRPM | OXYGEN SATURATION: 94 % | TEMPERATURE: 98.2 F | SYSTOLIC BLOOD PRESSURE: 126 MMHG | HEART RATE: 106 BPM

## 2021-12-16 DIAGNOSIS — D57.00 SICKLE CELL PAIN CRISIS (H): ICD-10-CM

## 2021-12-16 DIAGNOSIS — G81.10 SPASTIC HEMIPLEGIA, UNSPECIFIED ETIOLOGY, UNSPECIFIED LATERALITY (H): Primary | ICD-10-CM

## 2021-12-16 PROCEDURE — 250N000011 HC RX IP 250 OP 636: Performed by: PEDIATRICS

## 2021-12-16 PROCEDURE — 96374 THER/PROPH/DIAG INJ IV PUSH: CPT

## 2021-12-16 PROCEDURE — 258N000003 HC RX IP 258 OP 636: Performed by: PEDIATRICS

## 2021-12-16 PROCEDURE — 96361 HYDRATE IV INFUSION ADD-ON: CPT

## 2021-12-16 PROCEDURE — 96376 TX/PRO/DX INJ SAME DRUG ADON: CPT

## 2021-12-16 RX ORDER — MORPHINE SULFATE 2 MG/ML
2 INJECTION, SOLUTION INTRAMUSCULAR; INTRAVENOUS
Status: DISCONTINUED | OUTPATIENT
Start: 2021-12-16 | End: 2021-12-16 | Stop reason: HOSPADM

## 2021-12-16 RX ORDER — HEPARIN SODIUM (PORCINE) LOCK FLUSH IV SOLN 100 UNIT/ML 100 UNIT/ML
5 SOLUTION INTRAVENOUS
Status: CANCELLED | OUTPATIENT
Start: 2022-01-01

## 2021-12-16 RX ORDER — ONDANSETRON 8 MG/1
8 TABLET, FILM COATED ORAL
Status: CANCELLED
Start: 2022-01-01

## 2021-12-16 RX ORDER — DIPHENHYDRAMINE HCL 25 MG
25 CAPSULE ORAL
Status: CANCELLED
Start: 2022-01-01

## 2021-12-16 RX ORDER — MORPHINE SULFATE 2 MG/ML
2 INJECTION, SOLUTION INTRAMUSCULAR; INTRAVENOUS
Status: CANCELLED
Start: 2022-01-01

## 2021-12-16 RX ORDER — HEPARIN SODIUM (PORCINE) LOCK FLUSH IV SOLN 100 UNIT/ML 100 UNIT/ML
5 SOLUTION INTRAVENOUS
Status: DISCONTINUED | OUTPATIENT
Start: 2021-12-16 | End: 2021-12-16 | Stop reason: HOSPADM

## 2021-12-16 RX ORDER — HEPARIN SODIUM,PORCINE 10 UNIT/ML
5 VIAL (ML) INTRAVENOUS
Status: CANCELLED | OUTPATIENT
Start: 2022-01-01

## 2021-12-16 RX ADMIN — MORPHINE SULFATE 2 MG: 2 INJECTION, SOLUTION INTRAMUSCULAR; INTRAVENOUS at 13:43

## 2021-12-16 RX ADMIN — MORPHINE SULFATE 2 MG: 2 INJECTION, SOLUTION INTRAMUSCULAR; INTRAVENOUS at 12:47

## 2021-12-16 RX ADMIN — HEPARIN 5 ML: 100 SYRINGE at 14:48

## 2021-12-16 RX ADMIN — DEXTROSE AND SODIUM CHLORIDE 500 ML: 5; 450 INJECTION, SOLUTION INTRAVENOUS at 12:46

## 2021-12-16 RX ADMIN — MORPHINE SULFATE 2 MG: 2 INJECTION, SOLUTION INTRAMUSCULAR; INTRAVENOUS at 14:41

## 2021-12-16 NOTE — LETTER
12/16/2021         RE: Jennifer Cervantes  [address]  [facility] [address]        Dear Colleague,    Thank you for referring your patient, Jennifer Cervantes, to the [facility]. Please see a copy of my visit note below.    Infusion Nursing Note:  Jennifer Cervantes presents today for IVF and pain meds.    Patient seen by provider today: No   present during visit today: Not Applicable.    Note:  IVF over approximately 2 hours.  Morphine IV X 3 doses. Patient initially reported pain 9/10. Pain 6/10 at discharge. Patient states this is tolerable.  Patient declines PRN zofran and benadryl.    Intravenous Access:  Implanted Port.    Treatment Conditions:  Not Applicable.    Post Infusion Assessment:  Patient tolerated infusion without incident.  Blood return noted pre and post infusion.  Site patent and intact, free from redness, edema or discomfort.  No evidence of extravasations.  Access discontinued per protocol.     Discharge Plan:   Discharge instructions reviewed with: Patient.  Patient and/or family verbalized understanding of discharge instructions and all questions answered.  AVS to patient via [employer].  Patient will return PRN for next appointment.   Patient discharged in stable condition accompanied by: self.  Departure Mode: Ambulatory.    Administrations This Visit     dextrose 5% and 0.45% NaCl BOLUS     Admin Date  12/16/2021 Action  New Bag Dose  500 mL Rate  250 mL/hr Route  Intravenous Administered By  Claudia Landry, JOE          heparin 100 UNIT/ML injection 5 mL     Admin Date  12/16/2021 Action  Given Dose  5 mL Route  Intracatheter Administered By  Claudia Landry, RN          morphine (PF) injection 2 mg     Admin Date  12/16/2021 Action  Given Dose  2 mg Route  Intravenous Administered By  Claudia Landry, JOE           Admin Date  12/16/2021 Action  Given Dose  2 mg Route  Intravenous Administered By  Claudia Landry, JOE           Admin  Date  12/16/2021 Action  Given Dose  2 mg Route  Intravenous Administered By  Claudia Landry, JOE Landry, JOE                        Again, thank you for allowing me to participate in the care of your patient.        Sincerely,        [facility]

## 2021-12-16 NOTE — TELEPHONE ENCOUNTER
Pt called in to triage requesting IVF pain meds for lower back pain rated 9/10 started not sure days. Stated last took prn ms contin and oxycodone at 6 a.m. this morning without relief. Denied any fevers, chills, cough, sob, chest pain, numbness or tingling or other symptoms not typical of sickle cell pain.   Pt's last infusion was 12/15/21, last clinic visit 11/26/21 with follow-up on 12/20/21 with Dr. Duncan.   Infusion apts: 12/15, 12/14, 12/13, ED visit on 12/12, 12/10    Patient states they do not have own ride and it will take 20 minutes long to get to cancer clinic. Pt has apt at 11 a.m. at [place] location and will need ride from there to [facility].     Pt denied being out of home medications and needing any refills today.     Pt informed: If you do not hear from the infusion center by 2pm then you will not be able to get in for an infusion today.     Paged Dr. Duncan 0800: Ok to come to clinic.    Pt added to infusion wait list.  [facility] can take pt at 1300.  Called Jennifer and she confirmed she can come to apt. She has a therapy apt at 1100 at [facility] [place] [place] [facility].  Notified SW that pt needs transportation assistance and she will arrange ride.  IB sent to scheduling.  Infusion Charge nurse updated that pt confirmed she can come to apt.    Routed to provider and care team.

## 2021-12-16 NOTE — TELEPHONE ENCOUNTER
[facility] Triage    Incoming call:    Jennifer wanted to know when she could request her next Oxycodone.    She had 50 tablets on 12/10/21 4-6 a day - next week she could ask for a refill.    Advised pharmacy can help her with that.    Routing to Perla and Amanda Roth

## 2021-12-16 NOTE — PATIENT INSTRUCTIONS
Dear Jennifer Cervantes    Thank you for choosing [facility] ([facility]) for your infusion.  The following information is a summary of our appointment as well as important reminders.      We look forward in seeing you on your next appointment here at [facility] ([facility]).  Please don t hesitate to call us at [phone] to reschedule any of your appointments or to speak with one of the [facility] registered nurses.  It was a pleasure taking care of you today.    Sincerely,    [facility]  [facility]  [address] [place] [address]  [address]  Phone:  [phone]

## 2021-12-16 NOTE — PROGRESS NOTES
Social Work Note: Telephone Call  Oncology Clinic     Data/Intervention:  Patient Name:  Jennifer Cervantes DOB/Age: 2000, 21 years old     Call From: [facility] Triage        Reason for Call:  Transportation     Assessment:   called [employer] to arrange ride through patient's insurance. [employer] arranged  for patient with [employer] ([phone]).  Patient will need to call when ready for return ride home.     Plan:  Patient is aware of the transportation plan.  available to assist with any other needs.      CARLOS Chavez,LGUDAY  Hematology/Oncology Social Worker  Phone:[phone] Pager: [phone]

## 2021-12-16 NOTE — PROGRESS NOTES
Infusion Nursing Note:  Jennifer Cervantes presents today for IVF and pain meds.    Patient seen by provider today: No   present during visit today: Not Applicable.    Note:  IVF over approximately 2 hours.  Morphine IV X 3 doses. Patient initially reported pain 9/10. Pain 6/10 at discharge. Patient states this is tolerable.  Patient declines PRN zofran and benadryl.    Intravenous Access:  Implanted Port.    Treatment Conditions:  Not Applicable.    Post Infusion Assessment:  Patient tolerated infusion without incident.  Blood return noted pre and post infusion.  Site patent and intact, free from redness, edema or discomfort.  No evidence of extravasations.  Access discontinued per protocol.     Discharge Plan:   Discharge instructions reviewed with: Patient.  Patient and/or family verbalized understanding of discharge instructions and all questions answered.  AVS to patient via [employer].  Patient will return PRN for next appointment.   Patient discharged in stable condition accompanied by: self.  Departure Mode: Ambulatory.    Administrations This Visit     dextrose 5% and 0.45% NaCl BOLUS     Admin Date  12/16/2021 Action  New Bag Dose  500 mL Rate  250 mL/hr Route  Intravenous Administered By  Claudia Landry RN          heparin 100 UNIT/ML injection 5 mL     Admin Date  12/16/2021 Action  Given Dose  5 mL Route  Intracatheter Administered By  Claudia Landry RN          morphine (PF) injection 2 mg     Admin Date  12/16/2021 Action  Given Dose  2 mg Route  Intravenous Administered By  Claudia Landry RN           Admin Date  12/16/2021 Action  Given Dose  2 mg Route  Intravenous Administered By  Claudia Landry RN           Admin Date  12/16/2021 Action  Given Dose  2 mg Route  Intravenous Administered By  Claudia Landry RN Julie Backhaus, RN

## 2021-12-17 ENCOUNTER — INFUSION THERAPY VISIT (OUTPATIENT)
Dept: ONCOLOGY | Facility: CLINIC | Age: 21
End: 2021-12-17
Attending: PEDIATRICS
Payer: COMMERCIAL

## 2021-12-17 ENCOUNTER — TELEPHONE (OUTPATIENT)
Dept: ONCOLOGY | Facility: CLINIC | Age: 21
End: 2021-12-17
Payer: COMMERCIAL

## 2021-12-17 ENCOUNTER — PATIENT OUTREACH (OUTPATIENT)
Dept: CARE COORDINATION | Facility: CLINIC | Age: 21
End: 2021-12-17
Payer: COMMERCIAL

## 2021-12-17 ENCOUNTER — HOME INFUSION (PRE-WILLOW HOME INFUSION) (OUTPATIENT)
Dept: PHARMACY | Facility: CLINIC | Age: 21
End: 2021-12-17

## 2021-12-17 VITALS
DIASTOLIC BLOOD PRESSURE: 73 MMHG | SYSTOLIC BLOOD PRESSURE: 120 MMHG | HEART RATE: 100 BPM | TEMPERATURE: 98.6 F | OXYGEN SATURATION: 95 % | RESPIRATION RATE: 20 BRPM

## 2021-12-17 DIAGNOSIS — D57.00 SICKLE CELL PAIN CRISIS (H): ICD-10-CM

## 2021-12-17 DIAGNOSIS — G81.10 SPASTIC HEMIPLEGIA, UNSPECIFIED ETIOLOGY, UNSPECIFIED LATERALITY (H): Primary | ICD-10-CM

## 2021-12-17 PROCEDURE — 258N000003 HC RX IP 258 OP 636: Performed by: PEDIATRICS

## 2021-12-17 PROCEDURE — 96374 THER/PROPH/DIAG INJ IV PUSH: CPT

## 2021-12-17 PROCEDURE — 250N000011 HC RX IP 250 OP 636: Performed by: PEDIATRICS

## 2021-12-17 PROCEDURE — 96361 HYDRATE IV INFUSION ADD-ON: CPT

## 2021-12-17 PROCEDURE — 96376 TX/PRO/DX INJ SAME DRUG ADON: CPT

## 2021-12-17 RX ORDER — HEPARIN SODIUM (PORCINE) LOCK FLUSH IV SOLN 100 UNIT/ML 100 UNIT/ML
5 SOLUTION INTRAVENOUS
Status: CANCELLED | OUTPATIENT
Start: 2022-01-01

## 2021-12-17 RX ORDER — MORPHINE SULFATE 2 MG/ML
2 INJECTION, SOLUTION INTRAMUSCULAR; INTRAVENOUS
Status: DISCONTINUED | OUTPATIENT
Start: 2021-12-17 | End: 2021-12-17 | Stop reason: HOSPADM

## 2021-12-17 RX ORDER — HEPARIN SODIUM,PORCINE 10 UNIT/ML
5 VIAL (ML) INTRAVENOUS
Status: CANCELLED | OUTPATIENT
Start: 2022-01-01

## 2021-12-17 RX ORDER — HEPARIN SODIUM (PORCINE) LOCK FLUSH IV SOLN 100 UNIT/ML 100 UNIT/ML
5 SOLUTION INTRAVENOUS
Status: DISCONTINUED | OUTPATIENT
Start: 2021-12-17 | End: 2021-12-17 | Stop reason: HOSPADM

## 2021-12-17 RX ORDER — OXYCODONE HYDROCHLORIDE 15 MG/1
15 TABLET ORAL EVERY 4 HOURS PRN
Qty: 50 TABLET | Refills: 0 | Status: SHIPPED | OUTPATIENT
Start: 2021-12-18 | End: 2021-12-27

## 2021-12-17 RX ORDER — MORPHINE SULFATE 2 MG/ML
2 INJECTION, SOLUTION INTRAMUSCULAR; INTRAVENOUS
Status: CANCELLED
Start: 2022-01-01

## 2021-12-17 RX ORDER — ONDANSETRON 8 MG/1
8 TABLET, FILM COATED ORAL
Status: CANCELLED
Start: 2022-01-01

## 2021-12-17 RX ORDER — DIPHENHYDRAMINE HCL 25 MG
25 CAPSULE ORAL
Status: CANCELLED
Start: 2022-01-01

## 2021-12-17 RX ADMIN — MORPHINE SULFATE 2 MG: 2 INJECTION, SOLUTION INTRAMUSCULAR; INTRAVENOUS at 09:28

## 2021-12-17 RX ADMIN — Medication 5 ML: at 12:00

## 2021-12-17 RX ADMIN — MORPHINE SULFATE 2 MG: 2 INJECTION, SOLUTION INTRAMUSCULAR; INTRAVENOUS at 11:34

## 2021-12-17 RX ADMIN — MORPHINE SULFATE 2 MG: 2 INJECTION, SOLUTION INTRAMUSCULAR; INTRAVENOUS at 10:34

## 2021-12-17 RX ADMIN — DEXTROSE AND SODIUM CHLORIDE 500 ML: 5; 450 INJECTION, SOLUTION INTRAVENOUS at 09:25

## 2021-12-17 ASSESSMENT — PAIN SCALES - GENERAL: PAINLEVEL: EXTREME PAIN (9)

## 2021-12-17 NOTE — PROGRESS NOTES
Social Work Note: Telephone Call  Oncology Clinic     Data/Intervention:  Patient Name:  Jennifer Cervantes  /Age: 2000, 21 years old     Call From: [facility] Triage        Reason for Call:  Transportation     Assessment:   called [employer] ([phone]) to arrange ride. [employer] ([phone]) arranged  for patient from home with a will call return ride.  Patient will need to call when ready for return ride home.      Plan:  Patient is aware of the transportation plan.  available to assist with any other needs.      CARLOS Chavez,[facility]  Hematology/Oncology Social Worker  Phone:[phone] Pager: [phone]

## 2021-12-17 NOTE — PROGRESS NOTES
Infusion Nursing Note:  Jennifer Cervantes presents today for IV fluids and pain medication.    Patient seen by provider today: No   present during visit today: Not Applicable.    Note: Patient presents with pain in lower back rating 9/10.  Patient states her pain is typical for her sickle cell disease.  Patient took oxycodone and MSContin at 0600 with limited relief.  Patient denies chest pain or leg swelling.    She denies signs and symptoms of infection including:fever, cough, shortness of breath, sore throat, diarrhea, vomiting, rash, or pain with urination.     After 3 doses of Morphine pain was 6/10 and patient stated that she was comfortable discharging home.      Intravenous Access:  Implanted Port.    Treatment Conditions:  Per triage RN, Brandi Emerson, patient does not need labs today.      Post Infusion Assessment:  Patient tolerated infusion without incident.  Blood return noted pre and post infusion.  Site patent and intact, free from redness, edema or discomfort.  No evidence of extravasations.  Access discontinued per protocol.       Discharge Plan:   Patient declined prescription refills.  Discharge instructions reviewed with: Patient.  Patient and/or family verbalized understanding of discharge instructions and all questions answered.  Copy of AVS reviewed with patient and/or family.  Patient will return 12/20/21 for next appointment with Dr. Duncan and 12/22/21 for crizanlizumab.  Patient discharged in stable condition accompanied by: self.  Departure Mode: Ambulatory.      Criss Carmichael RN

## 2021-12-17 NOTE — CONFIDENTIAL NOTE
Narcotic Refill Request    Medication(s) requested:  Oxycodone 15mg tablet  Person Requesting Refill:Jennifer   What pain is the medication treating: Back pain   How is the medication being taken?:Taking 1 tablet every 4 hours 6 tablets a day  Does pt have enough for today? Yes, will be out tomorrow   Is pain being adequately controlled on the current regimen?: Yes along with infusions   Experiencing any side effects from medication?: NO     Date of most recent appointment:  11/30/21 Andrei HIGGINS  Any No Show Visits:No   Next appointment:   12/20/21 Dr Duncan   Last fill date and by whom:  12/10/21 Andrei HIGGINS   Reviewed: No access     Routed provider: Dr Duncan

## 2021-12-17 NOTE — TELEPHONE ENCOUNTER
This prescription was last filled 12/10. I will be talking with her Monday to continue weaning some of her opioids, but she cannot pickup her oxycodone sooner than 8 days (the prescription could/should last to 12 days or longer). This is chronic pain (not acute sickle pain) and opioids are unlikely to add significantly to analgesia. We were able to reduce the pill count several weeks ago and will continue to trial this change with her collaboration.    The pharmacy is open 8 am to 12 pm tomorrow at the [facility]

## 2021-12-17 NOTE — CONFIDENTIAL NOTE
Pt called in to triage requesting IVF pain meds for back  pain rated 10/10 x unsure of how long it has been going on.  Stated last took prn MS Contin and Oxycodone at 6am  this morning without relief. Denied any fevers, chills, cough, sob, chest pain, numbness or tingling or other symptoms not typical of sickle cell pain.   Pt's last infusion was 12/16/21, last clinic visit 11/30/21 with follow-up on 12/20/21 with Dr Duncan.   Patient states they do not have own ride and it will take 60 minutes long to get to cancer clinic.   Pt denied being out of home medications and needing any refills today.   If you do not hear from the infusion center by 2pm then you will not be able to get in for an infusion today.   Please note, if you are late for your appt, you risk losing your infusion appt as it may delay another patient's infusion who arrived on time.  7:15 called Amanda Roth and left message, also messaged Amanda    8:00 Paged Dr Duncan   8:02 Dr Duncan Ok'ed IVF/Pain     9:00 am availability for IVF/pain with [facility]    Call placed to Jennifer and she confirms she can come to that appointment.      Message sent to Social work to set up ride.      Message sent to  to schedule appointment.

## 2021-12-17 NOTE — PATIENT INSTRUCTIONS
Contact Numbers  [facility]: [phone] (for symptom and scheduling needs)    Please call the [facility] Triage line if you experience a temperature greater than or equal to 100.4, shaking chills, have uncontrolled nausea, vomiting and/or diarrhea, dizziness, shortness of breath, chest pain, bleeding, unexplained bruising, or if you have any other new/concerning symptoms, questions or concerns.     If you are having any concerning symptoms or wish to speak to a provider before your next infusion visit, please call your care coordinator or triage to notify them so we can adequately serve you.     If you need a refill on a narcotic prescription or other medication, please call triage before your infusion appointment.

## 2021-12-18 ENCOUNTER — HOSPITAL ENCOUNTER (EMERGENCY)
Facility: CLINIC | Age: 21
Discharge: HOME | End: 2021-12-19
Attending: EMERGENCY MEDICINE | Admitting: EMERGENCY MEDICINE
Payer: COMMERCIAL

## 2021-12-18 DIAGNOSIS — D57.00 SICKLE CELL PAIN CRISIS (H): ICD-10-CM

## 2021-12-18 PROCEDURE — 99285 EMERGENCY DEPT VISIT HI MDM: CPT | Performed by: EMERGENCY MEDICINE

## 2021-12-18 PROCEDURE — 99285 EMERGENCY DEPT VISIT HI MDM: CPT | Mod: 25 | Performed by: EMERGENCY MEDICINE

## 2021-12-18 PROCEDURE — 85027 COMPLETE CBC AUTOMATED: CPT | Performed by: EMERGENCY MEDICINE

## 2021-12-18 PROCEDURE — 96374 THER/PROPH/DIAG INJ IV PUSH: CPT | Performed by: EMERGENCY MEDICINE

## 2021-12-18 PROCEDURE — 36415 COLL VENOUS BLD VENIPUNCTURE: CPT | Performed by: EMERGENCY MEDICINE

## 2021-12-18 PROCEDURE — 80053 COMPREHEN METABOLIC PANEL: CPT | Performed by: EMERGENCY MEDICINE

## 2021-12-18 PROCEDURE — 250N000013 HC RX MED GY IP 250 OP 250 PS 637: Performed by: EMERGENCY MEDICINE

## 2021-12-18 PROCEDURE — 250N000011 HC RX IP 250 OP 636: Performed by: EMERGENCY MEDICINE

## 2021-12-18 RX ORDER — ONDANSETRON 8 MG/1
8 TABLET, ORALLY DISINTEGRATING ORAL ONCE
Status: COMPLETED | OUTPATIENT
Start: 2021-12-18 | End: 2021-12-18

## 2021-12-18 RX ORDER — SODIUM CHLORIDE, SODIUM LACTATE, POTASSIUM CHLORIDE, CALCIUM CHLORIDE 600; 310; 30; 20 MG/100ML; MG/100ML; MG/100ML; MG/100ML
INJECTION, SOLUTION INTRAVENOUS ONCE
Status: COMPLETED | OUTPATIENT
Start: 2021-12-18 | End: 2021-12-19

## 2021-12-18 RX ORDER — OXYCODONE HYDROCHLORIDE 10 MG/1
20 TABLET ORAL ONCE
Status: COMPLETED | OUTPATIENT
Start: 2021-12-18 | End: 2021-12-18

## 2021-12-18 RX ADMIN — ONDANSETRON 8 MG: 8 TABLET, ORALLY DISINTEGRATING ORAL at 23:41

## 2021-12-18 RX ADMIN — OXYCODONE HYDROCHLORIDE 20 MG: 10 TABLET ORAL at 23:41

## 2021-12-19 ENCOUNTER — HOME INFUSION (PRE-WILLOW HOME INFUSION) (OUTPATIENT)
Dept: PHARMACY | Facility: CLINIC | Age: 21
End: 2021-12-19
Payer: COMMERCIAL

## 2021-12-19 VITALS
OXYGEN SATURATION: 98 % | HEART RATE: 77 BPM | SYSTOLIC BLOOD PRESSURE: 127 MMHG | DIASTOLIC BLOOD PRESSURE: 78 MMHG | TEMPERATURE: 98 F | RESPIRATION RATE: 18 BRPM

## 2021-12-19 LAB
ALBUMIN SERPL-MCNC: 4 G/DL (ref 3.4–5)
ALP SERPL-CCNC: 80 U/L (ref 40–150)
ALT SERPL W P-5'-P-CCNC: 65 U/L (ref 0–50)
ANION GAP SERPL CALCULATED.3IONS-SCNC: 7 MMOL/L (ref 3–14)
AST SERPL W P-5'-P-CCNC: 69 U/L (ref 0–45)
BASOPHILS # BLD MANUAL: 0.2 10E3/UL (ref 0–0.2)
BASOPHILS NFR BLD MANUAL: 3 %
BILIRUB SERPL-MCNC: 2.8 MG/DL (ref 0.2–1.3)
BUN SERPL-MCNC: 8 MG/DL (ref 7–30)
CALCIUM SERPL-MCNC: 8.6 MG/DL (ref 8.5–10.1)
CHLORIDE BLD-SCNC: 110 MMOL/L (ref 94–109)
CO2 SERPL-SCNC: 21 MMOL/L (ref 20–32)
CREAT SERPL-MCNC: 0.58 MG/DL (ref 0.52–1.04)
EOSINOPHIL # BLD MANUAL: 0.5 10E3/UL (ref 0–0.7)
EOSINOPHIL NFR BLD MANUAL: 6 %
ERYTHROCYTE [DISTWIDTH] IN BLOOD BY AUTOMATED COUNT: 23.4 % (ref 10–15)
GFR SERPL CREATININE-BSD FRML MDRD: >90 ML/MIN/1.73M2
GLUCOSE BLD-MCNC: 101 MG/DL (ref 70–99)
HCT VFR BLD AUTO: 22.9 % (ref 35–47)
HGB BLD-MCNC: 8.3 G/DL (ref 11.7–15.7)
LYMPHOCYTES # BLD MANUAL: 3.1 10E3/UL (ref 0.8–5.3)
LYMPHOCYTES NFR BLD MANUAL: 39 %
MCH RBC QN AUTO: 32.4 PG (ref 26.5–33)
MCHC RBC AUTO-ENTMCNC: 36.2 G/DL (ref 31.5–36.5)
MCV RBC AUTO: 90 FL (ref 78–100)
MONOCYTES # BLD MANUAL: 0.3 10E3/UL (ref 0–1.3)
MONOCYTES NFR BLD MANUAL: 4 %
NEUTROPHILS # BLD MANUAL: 3.8 10E3/UL (ref 1.6–8.3)
NEUTROPHILS NFR BLD MANUAL: 48 %
NRBC # BLD AUTO: 0.5 10E3/UL
NRBC BLD MANUAL-RTO: 6 %
PLAT MORPH BLD: ABNORMAL
PLATELET # BLD AUTO: 323 10E3/UL (ref 150–450)
POLYCHROMASIA BLD QL SMEAR: SLIGHT
POTASSIUM BLD-SCNC: 3.8 MMOL/L (ref 3.4–5.3)
PROT SERPL-MCNC: 8 G/DL (ref 6.8–8.8)
RBC # BLD AUTO: 2.56 10E6/UL (ref 3.8–5.2)
RBC MORPH BLD: ABNORMAL
SICKLE CELLS BLD QL SMEAR: ABNORMAL
SODIUM SERPL-SCNC: 138 MMOL/L (ref 133–144)
TARGETS BLD QL SMEAR: SLIGHT
WBC # BLD AUTO: 7.9 10E3/UL (ref 4–11)

## 2021-12-19 PROCEDURE — 258N000003 HC RX IP 258 OP 636: Performed by: EMERGENCY MEDICINE

## 2021-12-19 PROCEDURE — 96376 TX/PRO/DX INJ SAME DRUG ADON: CPT | Performed by: EMERGENCY MEDICINE

## 2021-12-19 PROCEDURE — 999N000127 HC STATISTIC PERIPHERAL IV START W US GUIDANCE

## 2021-12-19 PROCEDURE — 96361 HYDRATE IV INFUSION ADD-ON: CPT | Performed by: EMERGENCY MEDICINE

## 2021-12-19 PROCEDURE — 250N000009 HC RX 250

## 2021-12-19 PROCEDURE — 250N000009 HC RX 250: Performed by: EMERGENCY MEDICINE

## 2021-12-19 RX ADMIN — Medication 5 MG: at 02:20

## 2021-12-19 RX ADMIN — Medication 5 MG: at 00:05

## 2021-12-19 RX ADMIN — SODIUM CHLORIDE, POTASSIUM CHLORIDE, SODIUM LACTATE AND CALCIUM CHLORIDE: 600; 310; 30; 20 INJECTION, SOLUTION INTRAVENOUS at 00:04

## 2021-12-19 NOTE — ED PROVIDER NOTES
ED Provider Note  [facility]      History   No chief complaint on file.    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  Patient states symptoms began approximately 2 hours ago.  There is no known precipitating factor to this event.  She states that cold weather often exacerbates her symptoms but does not believe this has caused this episode.  Patient notes diffuse body pain.  This is consistent with previous sickle cell flares.  She took home meds without improvement in symptoms.  No recent illness.  No other symptoms noted.    Past Medical History  Past Medical History:   Diagnosis Date     Anxiety      Bleeding disorder (H)      Blood clotting disorder (H)      Cerebral infarction (H) 2015     Cognitive developmental delay     low IQ. 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 (H)     right hand contractures     Iron overload due to repeated red blood cell transfusions      Migraines      Multiple subsegmental pulmonary emboli without acute cor pulmonale (H) 02/01/2021     Oppositional defiant behavior      Superficial venous thrombosis of arm, right 03/25/2021     Uncomplicated asthma      Past Surgical History:   Procedure Laterality Date     AS INSERT TUNNELED CV 2 CATH W/O PORT/PUMP       C BREAST REDUCTION (INCLUDES LIPO) TIER 3 Bilateral 04/23/2019     CHOLECYSTECTOMY       CV RIGHT HEART CATH MEASUREMENTS RECORDED N/A 11/18/2021    Procedure: Right Heart Cath;  Surgeon: Jackson Stauffer MD;  Location:  HEART CARDIAC CATH LAB     INSERT PORT VASCULAR ACCESS Left 4/21/2021    Procedure: INSERTION, VASCULAR ACCESS PORT (NOT SURE ON SIDE UNTIL REMOVAL);  Surgeon: Rajan More MD;  [place] [facility] [place]      CHEST  PORT PLACEMENT > 5 YRS OF AGE  4/21/2021     IR CVC NON TUNNEL LINE REMOVAL  5/6/2021     IR CVC NON TUNNEL PLACEMENT  04/07/2020     IR CVC NON TUNNEL PLACEMENT  4/30/2021     IR PORT REMOVAL LEFT  4/21/2021     REMOVE PORT VASCULAR ACCESS Left 4/21/2021    Procedure: REMOVAL, VASCULAR ACCESS PORT LEFT;  Surgeon: Rajan More MD;  Location: [facility] OR     REPAIR TENDON ELBOW Right 10/02/2019    Procedure: Right Elbow Flexor Lengthening, Flexor Pronator Slide Of Wrist and Finger, Thumb Adductor Lengthening;  Surgeon: Anai Franco MD;  Location: [facility] OR     TONSILLECTOMY Bilateral 10/02/2019    Procedure: Bilateral Tonsillectomy;  Surgeon: Farhana Guy MD;  Location: [facility] OR     acetaminophen (TYLENOL) 325 MG tablet  albuterol (PROAIR HFA/PROVENTIL HFA/VENTOLIN HFA) 108 (90 Base) MCG/ACT inhaler  albuterol (PROVENTIL) (2.5 MG/3ML) 0.083% neb solution  ARIPiprazole (ABILIFY) 2 MG tablet  budesonide-formoterol (SYMBICORT) 160-4.5 MCG/ACT Inhaler  deferasirox (JADENU) 360 MG tablet  diphenhydrAMINE (BENADRYL) 25 MG capsule  EPINEPHrine (ANY BX GENERIC EQUIV) 0.3 MG/0.3ML injection 2-pack  Hydroxyurea 1000 MG TABS  hydrOXYzine (ATARAX) 25 MG tablet  lidocaine-prilocaine (EMLA) 2.5-2.5 % external cream  medroxyPROGESTERone (DEPO-PROVERA) 150 MG/ML IM injection  morphine (MS CONTIN) 15 MG CR tablet  naloxone (NARCAN) 4 MG/0.1ML nasal spray  omeprazole (PRILOSEC) 20 MG DR capsule  ondansetron (ZOFRAN) 8 MG tablet  oxyCODONE IR (ROXICODONE) 15 MG tablet  sertraline (ZOLOFT) 100 MG tablet  warfarin ANTICOAGULANT (COUMADIN) 5 MG tablet  warfarin ANTICOAGULANT (COUMADIN) 7.5 MG tablet      Allergies   Allergen Reactions     Contrast Dye      Hives and breathing issues     Fish-Derived Products Hives     Seafood Hives     Diagnostic X-Ray Materials      Gadolinium      Family History  Family History   Problem Relation Age of Onset     Sickle Cell Trait Mother      Hypertension Mother      Asthma Mother       Sickle Cell Trait Father      Social History   Social History     Tobacco Use     Smoking status: Never Smoker     Smokeless tobacco: Never Used   Substance Use Topics     Alcohol use: Not Currently     Alcohol/week: 0.0 standard drinks     Drug use: Never      Past medical history, past surgical history, medications, allergies, family history, and social history were reviewed with the patient. No additional pertinent items.       Review of Systems  A complete review of systems was performed with pertinent positives and negatives noted in the HPI, and all other systems negative.    Physical Exam      Physical Exam  Vitals and nursing note reviewed.   Constitutional:       General: She is not in acute distress.     Appearance: She is well-developed. She is not diaphoretic.   HENT:      Head: Normocephalic and atraumatic.      Mouth/Throat:      Pharynx: No oropharyngeal exudate.   Eyes:      General: No scleral icterus.        Right eye: No discharge.         Left eye: No discharge.      Pupils: Pupils are equal, round, and reactive to light.   Cardiovascular:      Rate and Rhythm: Normal rate and regular rhythm.      Heart sounds: Normal heart sounds. No murmur heard.  No friction rub. No gallop.    Pulmonary:      Effort: Pulmonary effort is normal. No respiratory distress.      Breath sounds: Normal breath sounds. No wheezing.   Chest:      Chest wall: No tenderness.   Abdominal:      General: Bowel sounds are normal. There is no distension.      Palpations: Abdomen is soft.      Tenderness: There is no abdominal tenderness.   Musculoskeletal:         General: No tenderness or deformity. Normal range of motion.      Cervical back: Normal range of motion and neck supple.   Skin:     General: Skin is warm and dry.      Coloration: Skin is not pale.      Findings: No erythema or rash.   Neurological:      Mental Status: She is alert and oriented to person, place, and time.      Cranial Nerves: No cranial nerve  deficit.         ED Course      Procedures                     No results found for any visits on 12/18/21.  Medications - No data to display     Assessments & Plan (with Medical Decision Making)   This is a 21-year-old female with a history of sickle cell who presents with pain.  Patient states that this is consistent with previous sickle cell pain flares.  Exam demonstrates no acute abnormalities.  Lab work shows no acute abnormalities.  For patient's care plan she was given IV fluids, p.o. oxycodone, and ondansetron.  Patient was also given ketamine.  On reevaluation patient is feeling improved. Will discharge home with return precautions. Discussed reasons to return to the emergency department.  Patient understands and agrees with this plan.    I have reviewed the nursing notes. I have reviewed the findings, diagnosis, plan and need for follow up with the patient.    New Prescriptions    No medications on file       Final diagnoses:   None       --  Jitendra Brandon DO  [facility] EMERGENCY DEPARTMENT  12/18/2021     Jitendra Brandon DO  12/19/21 0253

## 2021-12-19 NOTE — ED TRIAGE NOTES
Sickle cell crisis started approximately two hours ago. Pain medications from home are not effective (oxycodone).

## 2021-12-20 ENCOUNTER — INFUSION THERAPY VISIT (OUTPATIENT)
Dept: INFUSION THERAPY | Facility: CLINIC | Age: 21
End: 2021-12-20
Attending: PEDIATRICS
Payer: COMMERCIAL

## 2021-12-20 ENCOUNTER — TELEPHONE (OUTPATIENT)
Dept: ONCOLOGY | Facility: CLINIC | Age: 21
End: 2021-12-20

## 2021-12-20 ENCOUNTER — ANTICOAGULATION THERAPY VISIT (OUTPATIENT)
Dept: ANTICOAGULATION | Facility: CLINIC | Age: 21
End: 2021-12-20

## 2021-12-20 ENCOUNTER — PATIENT OUTREACH (OUTPATIENT)
Dept: CARE COORDINATION | Facility: CLINIC | Age: 21
End: 2021-12-20

## 2021-12-20 ENCOUNTER — ONCOLOGY VISIT (OUTPATIENT)
Dept: ONCOLOGY | Facility: CLINIC | Age: 21
End: 2021-12-20
Attending: PEDIATRICS
Payer: COMMERCIAL

## 2021-12-20 ENCOUNTER — LAB (OUTPATIENT)
Dept: LAB | Facility: CLINIC | Age: 21
End: 2021-12-20
Attending: PEDIATRICS
Payer: COMMERCIAL

## 2021-12-20 VITALS
HEART RATE: 99 BPM | OXYGEN SATURATION: 95 % | RESPIRATION RATE: 18 BRPM | DIASTOLIC BLOOD PRESSURE: 81 MMHG | SYSTOLIC BLOOD PRESSURE: 127 MMHG

## 2021-12-20 VITALS
WEIGHT: 170 LBS | SYSTOLIC BLOOD PRESSURE: 146 MMHG | OXYGEN SATURATION: 92 % | TEMPERATURE: 98.4 F | HEART RATE: 110 BPM | BODY MASS INDEX: 29.18 KG/M2 | DIASTOLIC BLOOD PRESSURE: 87 MMHG | RESPIRATION RATE: 18 BRPM

## 2021-12-20 DIAGNOSIS — G89.4 CHRONIC PAIN SYNDROME: ICD-10-CM

## 2021-12-20 DIAGNOSIS — J45.909 ASTHMATIC BRONCHITIS WITHOUT COMPLICATION, UNSPECIFIED ASTHMA SEVERITY, UNSPECIFIED WHETHER PERSISTENT: ICD-10-CM

## 2021-12-20 DIAGNOSIS — D57.1 SICKLE CELL DISEASE WITHOUT CRISIS (H): Primary | ICD-10-CM

## 2021-12-20 DIAGNOSIS — K29.00 OTHER ACUTE GASTRITIS WITHOUT HEMORRHAGE: ICD-10-CM

## 2021-12-20 DIAGNOSIS — D57.1 SICKLE CELL DISEASE WITHOUT CRISIS (H): ICD-10-CM

## 2021-12-20 DIAGNOSIS — D57.1 HB-SS DISEASE WITHOUT CRISIS (H): ICD-10-CM

## 2021-12-20 DIAGNOSIS — D57.00 SICKLE CELL PAIN CRISIS (H): ICD-10-CM

## 2021-12-20 DIAGNOSIS — F11.90 OPIOID USE DISORDER: ICD-10-CM

## 2021-12-20 DIAGNOSIS — F32.A DEPRESSION, UNSPECIFIED DEPRESSION TYPE: ICD-10-CM

## 2021-12-20 DIAGNOSIS — I27.82 CHRONIC PULMONARY EMBOLISM WITHOUT ACUTE COR PULMONALE, UNSPECIFIED PULMONARY EMBOLISM TYPE (H): Primary | ICD-10-CM

## 2021-12-20 DIAGNOSIS — G81.10 SPASTIC HEMIPLEGIA, UNSPECIFIED ETIOLOGY, UNSPECIFIED LATERALITY (H): Primary | ICD-10-CM

## 2021-12-20 DIAGNOSIS — E83.111 IRON OVERLOAD DUE TO REPEATED RED BLOOD CELL TRANSFUSIONS: ICD-10-CM

## 2021-12-20 DIAGNOSIS — F41.9 ANXIETY: ICD-10-CM

## 2021-12-20 DIAGNOSIS — I26.94 MULTIPLE SUBSEGMENTAL PULMONARY EMBOLI WITHOUT ACUTE COR PULMONALE (H): ICD-10-CM

## 2021-12-20 LAB
ALBUMIN SERPL-MCNC: 4.2 G/DL (ref 3.4–5)
ALP SERPL-CCNC: 82 U/L (ref 40–150)
ALT SERPL W P-5'-P-CCNC: 61 U/L (ref 0–50)
ANION GAP SERPL CALCULATED.3IONS-SCNC: 9 MMOL/L (ref 3–14)
AST SERPL W P-5'-P-CCNC: 61 U/L (ref 0–45)
BASOPHILS # BLD AUTO: 0.2 10E3/UL (ref 0–0.2)
BASOPHILS NFR BLD AUTO: 2 %
BILIRUB SERPL-MCNC: 3.4 MG/DL (ref 0.2–1.3)
BUN SERPL-MCNC: 7 MG/DL (ref 7–30)
CALCIUM SERPL-MCNC: 9 MG/DL (ref 8.5–10.1)
CHLORIDE BLD-SCNC: 110 MMOL/L (ref 94–109)
CO2 SERPL-SCNC: 19 MMOL/L (ref 20–32)
CREAT SERPL-MCNC: 0.55 MG/DL (ref 0.52–1.04)
EOSINOPHIL # BLD AUTO: 0.5 10E3/UL (ref 0–0.7)
EOSINOPHIL NFR BLD AUTO: 5 %
ERYTHROCYTE [DISTWIDTH] IN BLOOD BY AUTOMATED COUNT: 23.6 % (ref 10–15)
FERRITIN SERPL-MCNC: 6016 NG/ML (ref 12–150)
GFR SERPL CREATININE-BSD FRML MDRD: >90 ML/MIN/1.73M2
GLUCOSE BLD-MCNC: 86 MG/DL (ref 70–99)
HCT VFR BLD AUTO: 23.3 % (ref 35–47)
HGB BLD-MCNC: 8.3 G/DL (ref 11.7–15.7)
IMM GRANULOCYTES # BLD: 0.1 10E3/UL
IMM GRANULOCYTES NFR BLD: 0 %
INR PPP: 1.27 (ref 0.85–1.15)
LYMPHOCYTES # BLD AUTO: 2.6 10E3/UL (ref 0.8–5.3)
LYMPHOCYTES NFR BLD AUTO: 24 %
MCH RBC QN AUTO: 30.7 PG (ref 26.5–33)
MCHC RBC AUTO-ENTMCNC: 35.6 G/DL (ref 31.5–36.5)
MCV RBC AUTO: 86 FL (ref 78–100)
MONOCYTES # BLD AUTO: 0.8 10E3/UL (ref 0–1.3)
MONOCYTES NFR BLD AUTO: 7 %
NEUTROPHILS # BLD AUTO: 6.9 10E3/UL (ref 1.6–8.3)
NEUTROPHILS NFR BLD AUTO: 62 %
NRBC # BLD AUTO: 0.5 10E3/UL
NRBC BLD AUTO-RTO: 5 /100
PLATELET # BLD AUTO: 325 10E3/UL (ref 150–450)
POTASSIUM BLD-SCNC: 3.9 MMOL/L (ref 3.4–5.3)
PROT SERPL-MCNC: 8.1 G/DL (ref 6.8–8.8)
RBC # BLD AUTO: 2.7 10E6/UL (ref 3.8–5.2)
SODIUM SERPL-SCNC: 138 MMOL/L (ref 133–144)
WBC # BLD AUTO: 11.1 10E3/UL (ref 4–11)

## 2021-12-20 PROCEDURE — 250N000011 HC RX IP 250 OP 636: Performed by: PEDIATRICS

## 2021-12-20 PROCEDURE — G0463 HOSPITAL OUTPT CLINIC VISIT: HCPCS | Mod: 25

## 2021-12-20 PROCEDURE — 258N000003 HC RX IP 258 OP 636: Performed by: PEDIATRICS

## 2021-12-20 PROCEDURE — 80053 COMPREHEN METABOLIC PANEL: CPT

## 2021-12-20 PROCEDURE — G0463 HOSPITAL OUTPT CLINIC VISIT: HCPCS

## 2021-12-20 PROCEDURE — 96374 THER/PROPH/DIAG INJ IV PUSH: CPT

## 2021-12-20 PROCEDURE — 36591 DRAW BLOOD OFF VENOUS DEVICE: CPT

## 2021-12-20 PROCEDURE — 85025 COMPLETE CBC W/AUTO DIFF WBC: CPT

## 2021-12-20 PROCEDURE — 85610 PROTHROMBIN TIME: CPT

## 2021-12-20 PROCEDURE — 96376 TX/PRO/DX INJ SAME DRUG ADON: CPT

## 2021-12-20 PROCEDURE — 96361 HYDRATE IV INFUSION ADD-ON: CPT

## 2021-12-20 PROCEDURE — 99214 OFFICE O/P EST MOD 30 MIN: CPT | Performed by: PEDIATRICS

## 2021-12-20 PROCEDURE — 82728 ASSAY OF FERRITIN: CPT

## 2021-12-20 RX ORDER — HEPARIN SODIUM (PORCINE) LOCK FLUSH IV SOLN 100 UNIT/ML 100 UNIT/ML
5 SOLUTION INTRAVENOUS
Status: CANCELLED | OUTPATIENT
Start: 2022-01-01

## 2021-12-20 RX ORDER — DIPHENHYDRAMINE HCL 25 MG
25 CAPSULE ORAL
Status: CANCELLED
Start: 2022-01-01

## 2021-12-20 RX ORDER — HEPARIN SODIUM (PORCINE) LOCK FLUSH IV SOLN 100 UNIT/ML 100 UNIT/ML
5 SOLUTION INTRAVENOUS ONCE
Status: COMPLETED | OUTPATIENT
Start: 2021-12-20 | End: 2021-12-20

## 2021-12-20 RX ORDER — MORPHINE SULFATE 2 MG/ML
2 INJECTION, SOLUTION INTRAMUSCULAR; INTRAVENOUS
Status: DISCONTINUED | OUTPATIENT
Start: 2021-12-20 | End: 2021-12-20 | Stop reason: HOSPADM

## 2021-12-20 RX ORDER — ARIPIPRAZOLE 2 MG/1
2 TABLET ORAL DAILY
Qty: 30 TABLET | Refills: 3 | Status: SHIPPED | OUTPATIENT
Start: 2021-12-20 | End: 2022-01-17

## 2021-12-20 RX ORDER — HEPARIN SODIUM,PORCINE 10 UNIT/ML
5 VIAL (ML) INTRAVENOUS
Status: CANCELLED | OUTPATIENT
Start: 2022-01-01

## 2021-12-20 RX ORDER — HYDROXYZINE HYDROCHLORIDE 25 MG/1
25 TABLET, FILM COATED ORAL 3 TIMES DAILY PRN
Qty: 30 TABLET | Refills: 1 | Status: SHIPPED | OUTPATIENT
Start: 2021-12-20 | End: 2022-01-17

## 2021-12-20 RX ORDER — ALBUTEROL SULFATE 0.83 MG/ML
2.5 SOLUTION RESPIRATORY (INHALATION) EVERY 6 HOURS PRN
Qty: 12 ML | Refills: 4 | Status: SHIPPED | OUTPATIENT
Start: 2021-12-20 | End: 2022-05-17

## 2021-12-20 RX ORDER — MORPHINE SULFATE 15 MG/1
15 TABLET, FILM COATED, EXTENDED RELEASE ORAL EVERY 12 HOURS
Qty: 60 TABLET | Refills: 0 | Status: SHIPPED | OUTPATIENT
Start: 2021-12-28 | End: 2022-02-03

## 2021-12-20 RX ORDER — ONDANSETRON 8 MG/1
8 TABLET, FILM COATED ORAL
Status: CANCELLED
Start: 2022-01-01

## 2021-12-20 RX ORDER — ONDANSETRON 8 MG/1
8 TABLET, FILM COATED ORAL EVERY 8 HOURS PRN
Qty: 30 TABLET | Refills: 1 | Status: SHIPPED | OUTPATIENT
Start: 2021-12-20 | End: 2022-12-21

## 2021-12-20 RX ORDER — BUDESONIDE AND FORMOTEROL FUMARATE DIHYDRATE 160; 4.5 UG/1; UG/1
2 AEROSOL RESPIRATORY (INHALATION) 2 TIMES DAILY
Qty: 10.2 G | Refills: 3 | Status: SHIPPED | OUTPATIENT
Start: 2021-12-20 | End: 2022-07-11

## 2021-12-20 RX ORDER — MORPHINE SULFATE 2 MG/ML
2 INJECTION, SOLUTION INTRAMUSCULAR; INTRAVENOUS
Status: CANCELLED
Start: 2022-01-01

## 2021-12-20 RX ORDER — HEPARIN SODIUM (PORCINE) LOCK FLUSH IV SOLN 100 UNIT/ML 100 UNIT/ML
5 SOLUTION INTRAVENOUS
Status: DISCONTINUED | OUTPATIENT
Start: 2021-12-20 | End: 2021-12-20 | Stop reason: HOSPADM

## 2021-12-20 RX ADMIN — MORPHINE SULFATE 2 MG: 2 INJECTION, SOLUTION INTRAMUSCULAR; INTRAVENOUS at 17:33

## 2021-12-20 RX ADMIN — MORPHINE SULFATE 2 MG: 2 INJECTION, SOLUTION INTRAMUSCULAR; INTRAVENOUS at 16:33

## 2021-12-20 RX ADMIN — DEXTROSE AND SODIUM CHLORIDE 500 ML: 5; 450 INJECTION, SOLUTION INTRAVENOUS at 15:29

## 2021-12-20 RX ADMIN — MORPHINE SULFATE 2 MG: 2 INJECTION, SOLUTION INTRAMUSCULAR; INTRAVENOUS at 15:31

## 2021-12-20 RX ADMIN — HEPARIN 5 ML: 100 SYRINGE at 17:40

## 2021-12-20 RX ADMIN — Medication 5 ML: at 14:10

## 2021-12-20 ASSESSMENT — PAIN SCALES - GENERAL: PAINLEVEL: WORST PAIN (10)

## 2021-12-20 NOTE — PROGRESS NOTES
"Reviewed patient's last couple encounters reviewed, including ADT and office visit with Dr. Duncan today. Per her visit with MD she reported alternating 5 mg and 7.5 mg every day.     \"Her INR has been on the low side for a number of days. Anticoagulation clinic has struggled to get her up to her normal dose.  She is alternating 5 mg and 7.5 mg each day.  She denies missing any doses.  We did talk a little bit more about the food she eats, since I had previously looked and figure out whether any of her medicines interact with the warfarin.  I did not find any major interactions, certainly none that would cause increased metabolism, but it does sound like that she tries to eat some fruits and vegetables particularly cooked broccoli many days, and that may be one of her reasons why her INR has been subtherapeutic.  Otherwise, she has not had any other big changes to her diet or new meds.\"     Spoke with Jennifer who states that she believes that she took recommended dosing by [facility] over the past week and a half. Attempted to explain the dosing recommended by [facility] is different than what she told her MD earlier in the day. [facility] has been recommending a maintenance dose increase for the past 4 weeks. Dosing recommended two weeks ago was 10 mg every day-then increased again last week to taking 10 mg 3 days/week and 12.5 mg 4 days/week. Patient seemed confused as to the actual dose that she was taking and stated that she would call back [facility] with her dosing. No return call received. Unsure if patient has actually increased her dosing over the past few weeks despite verbalizing and agreeing to the dose increases. She is due for labs again tomorrow-INR added on to be checked tomorrow. Multiple recent ED visits with sickle cell pain crisis. Last ED encounter 12/18/21-12/19/21 with no new medications or changes.    PRINCESS MORENO RN-BC, [facility]  Anticoagulation Clinic  [phone]        "

## 2021-12-20 NOTE — LETTER
12/20/2021         RE: Jennifer Cervantes  [address]  [place] [address]        Dear Colleague,    Thank you for referring your patient, Jennifer Cervantes, to the [facility] TREAT[facility]. Please see a copy of my visit note below.    Nursing Note  Jennifer Cervantes presents today to [facility] for:   Chief Complaint   Patient presents with     Infusion     IVF     During today's [facility] appointment, orders from Dr. Duncan were completed.  Frequency: once and as needed    Progress note:  Patient identification verified by name and date of birth.  Assessment completed.  Vitals recorded in Doc Flowsheets.  Patient was provided with education regarding medication/procedure and possible side effects.  Patient verbalized understanding.     present during visit today: Not Applicable.    Treatment Conditions: Non-applicable. Pt reporting 10/10 pain to lower back initially. Gave Morphine 2mg every hour x 3 doses.     Infusion length and rate:  infusion given over approximately 2 hours    Labs: were drawn prior to appointment earlier today.    Vascular access: port accessed today.    Is the next appt scheduled? no  Asymptomatic COVID test completed? no    Post Infusion Assessment:  Patient tolerated infusion without incident.     Discharge Plan:   Follow up plan of care with: ordering provider as scheduled.  Discharge instructions were reviewed with patient.  Patient/representative verbalized understanding of discharge instructions and all questions answered.  Patient discharged from [facility] in stable condition.    Patricia Qureshi RN    Administrations This Visit     dextrose 5% and 0.45% NaCl BOLUS     Admin Date  12/20/2021 Action  New Bag Dose  500 mL Rate  250 mL/hr Route  Intravenous Administered By  Patricia Qureshi RN          heparin 100 UNIT/ML injection 5 mL     Admin  Date  12/20/2021 Action  Given Dose  5 mL Route  Intracatheter Administered By  Yina Ford RN          morphine (PF) injection 2 mg     Admin Date  12/20/2021 Action  Given Dose  2 mg Route  Intravenous Administered By  Patricia Qureshi RN           Admin Date  12/20/2021 Action  Given Dose  2 mg Route  Intravenous Administered By  Patricia Qureshi RN           Admin Date  12/20/2021 Action  Given Dose  2 mg Route  Intravenous Administered By  Yina Ford RN                /81   Pulse 99   Resp 18   SpO2 95%         Again, thank you for allowing me to participate in the care of your patient.        Sincerely,        [facility] [place]

## 2021-12-20 NOTE — LETTER
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      She was admitted 2/1/21-2/3/21 with a new PE, started on Rivaroxaban. Switched to Eliquis 3/25/21 with RUE DVT.     She was admitted 4/26/21-5/11/21 with sickle cell pain crisis complicated by worsening PE in setting of low Apixaban levels, acute hypoxic respiratory failure, pneumonia, acute chest syndrome s/p exchange transfusion on 4/30 and 5/4. After 2nd exchange her oxygen requirement dramatically improved from 20L to 1-2L 5/5 and she was off oxygen as of 5/6.      She was admitted 7/13/21-7/25/21 for acute on chronic PE, switched to dabigitran + ASA.      Due to worsening hypoxia she underwent VQ scan 11/10/21 which showed acute on chronic PE for which she was admitted 11/10-11/21. During admission was switched to warfarin. Echo WNL and no signs of pulm HTN on right heart cath. She did receive 1 unit pRBC for hgb 6.4 during admission.    She is following up for pain management and anticoagulation.    Interval History:  Jennifer is seeing me for the first time in clinic for our first follow up in about 2 months. She said the pain is really bad right now with the cold weather. She has been in infusion or the ED for most of the days in the past month. She is taking her oxycodone every 6 hours as is allowed in her PRN prescription. She said the pain is in her back, sometimes in her chest, but she  is not having dyspnea. She does say that she has not had any bleeding despite being on warfarin. Her INR has been on the low side for a number of days.  Anticoagulation clinic has struggled to get her up to her normal dose.  She is alternating 5 mg and 7.5 mg each day.  She denies missing any doses.  We did talk a little bit more about the food she eats, since I had previously looked and figure out whether any of her medicines interact with the warfarin.  I did not find any major interactions, certainly none that would cause increased metabolism, but it does sound like that she tries to eat some fruits and vegetables particularly cooked broccoli many days, and that may be one of her reasons why her INR has been subtherapeutic.  Otherwise, she has not had any other big changes to her diet or new meds.  She has back on her Jadenu and Desferal.  There were previous insurance issues and the Jadenu was not getting mailed to her because they could not reach her.  Now she is in a be picking up at our pharmacy is she gets other meds here and she is here frequently enough.      We did talk about support at home.  She said that her mom has been really stressed with moving, and she also tends to get triggered by a lot of the health issues that Jennifer is having.  Per Jennifer, her mom's response to that stress is actually to pull herself further away from caring that history made a lot of her care more challenging in this very difficult past year.  She does think her mom is supportive overall, but just does not know how to connect with her to be helpful.  Finally, she did bring up that while her port is not tilted, she has a lot of keloid over it and she is concerned about that.  She wants to see.  If she can get a different port placement.  We also talked a little bit about her mood and she is not sure her sertraline is working.  She has found very little difference despite her moderate current dosing.      Current Outpatient  Medications   Medication Sig Dispense Refill     acetaminophen (TYLENOL) 325 MG tablet Take 2 tablets (650 mg) by mouth every 6 hours as needed for mild pain 120 tablet 3     albuterol (PROAIR HFA/PROVENTIL HFA/VENTOLIN HFA) 108 (90 Base) MCG/ACT inhaler Inhale 2 puffs into the lungs every 6 hours as needed for shortness of breath / dyspnea or wheezing 8.5 g 3     albuterol (PROVENTIL) (2.5 MG/3ML) 0.083% neb solution Take 1 vial (2.5 mg) by nebulization every 6 hours as needed for shortness of breath / dyspnea or wheezing 12 mL 4     ARIPiprazole (ABILIFY) 2 MG tablet Take 1 tablet (2 mg) by mouth daily 30 tablet 3     budesonide-formoterol (SYMBICORT) 160-4.5 MCG/ACT Inhaler Inhale 2 puffs into the lungs 2 times daily 10.2 g 3     deferasirox (JADENU) 360 MG tablet Take 4 tablets (1,440 mg) by mouth every evening 120 tablet 4     diphenhydrAMINE (BENADRYL) 25 MG capsule Take 1-2 capsules (25-50 mg) by mouth nightly as needed for sleep 60 capsule 3     EPINEPHrine (ANY BX GENERIC EQUIV) 0.3 MG/0.3ML injection 2-pack Inject 0.3 mLs (0.3 mg) into the muscle as needed for anaphylaxis 1 each 1     Hydroxyurea 1000 MG TABS Take 2,000 mg by mouth daily 60 tablet 3     hydrOXYzine (ATARAX) 25 MG tablet Take 1 tablet (25 mg) by mouth 3 times daily as needed for anxiety 30 tablet 1     [START ON 12/28/2021] morphine (MS CONTIN) 15 MG CR tablet Take 1 tablet (15 mg) by mouth every 12 hours 60 tablet 0     naloxone (NARCAN) 4 MG/0.1ML nasal spray Spray 1 spray (4 mg) into one nostril alternating nostrils once as needed for opioid reversal every 2-3 minutes until assistance arrives 0.2 mL 1     omeprazole (PRILOSEC) 20 MG DR capsule Take 1 capsule (20 mg) by mouth daily 30 capsule 1     ondansetron (ZOFRAN) 8 MG tablet Take 1 tablet (8 mg) by mouth every 8 hours as needed 30 tablet 1     oxyCODONE IR (ROXICODONE) 15 MG tablet Take 1 tablet (15 mg) by mouth every 4 hours as needed for severe pain Goal 4 per day. Max 6 per day.  50 tablet 0     sertraline (ZOLOFT) 100 MG tablet Take 2 tablets (200 mg) by mouth daily 60 tablet 3     warfarin ANTICOAGULANT (COUMADIN) 5 MG tablet Take 1 tablet (5 mg) by mouth four times a week Take 5 mg on Tues, Thurs, Sat, Sun. INR check on Tues 11/23 so dose can change. 16 tablet 0     warfarin ANTICOAGULANT (COUMADIN) 7.5 MG tablet Take 1 tablet (7.5 mg) by mouth three times a week Take 7.5 mg on Mon, Wed, Fri. INR check on Tues 11/23 so dose can change. 12 tablet 0     lidocaine-prilocaine (EMLA) 2.5-2.5 % external cream Apply topically once for 1 dose Use for port site as needed (Patient not taking: Reported on 11/10/2021) 30 g 1     medroxyPROGESTERone (DEPO-PROVERA) 150 MG/ML IM injection Inject 150 mg into the muscle         Past Medical History  Past Medical History:   Diagnosis Date     Anxiety      Bleeding disorder (H)      Blood clotting disorder (H)      Cerebral infarction (H) 2015     Cognitive developmental delay     low IQ. 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 (H)     right hand contractures     Iron overload due to repeated red blood cell transfusions      Migraines      Multiple subsegmental pulmonary emboli without acute cor pulmonale (H) 02/01/2021     Oppositional defiant behavior      Superficial venous thrombosis of arm, right 03/25/2021     Uncomplicated asthma      Past Surgical History:   Procedure Laterality Date     AS INSERT TUNNELED CV 2 CATH W/O PORT/PUMP       C BREAST REDUCTION (INCLUDES LIPO) TIER 3 Bilateral 04/23/2019     CHOLECYSTECTOMY       CV RIGHT HEART CATH MEASUREMENTS RECORDED N/A 11/18/2021    Procedure: Right Heart Cath;  Surgeon: Jackson Stauffer MD;  Location:  HEART CARDIAC CATH LAB     INSERT PORT VASCULAR ACCESS Left 4/21/2021    Procedure: INSERTION, VASCULAR ACCESS PORT (NOT SURE ON SIDE UNTIL REMOVAL);  Surgeon:  Rajan More MD;  Location: [facility] OR     IR CHEST PORT PLACEMENT > 5 YRS OF AGE  4/21/2021     IR CVC NON TUNNEL LINE REMOVAL  5/6/2021     IR CVC NON TUNNEL PLACEMENT  04/07/2020     IR CVC NON TUNNEL PLACEMENT  4/30/2021     IR PORT REMOVAL LEFT  4/21/2021     REMOVE PORT VASCULAR ACCESS Left 4/21/2021    Procedure: REMOVAL, VASCULAR ACCESS PORT LEFT;  Surgeon: Rajan More MD;  Location: [facility] OR     REPAIR TENDON ELBOW Right 10/02/2019    Procedure: Right Elbow Flexor Lengthening, Flexor Pronator Slide Of Wrist and Finger, Thumb Adductor Lengthening;  Surgeon: Anai Franco MD;  Location: [facility] OR     TONSILLECTOMY Bilateral 10/02/2019    Procedure: Bilateral Tonsillectomy;  Surgeon: Farhana Guy MD;  Location: [facility] OR     Allergies   Allergen Reactions     Contrast Dye      Hives and breathing issues     Fish-Derived Products Hives     Seafood Hives     Diagnostic X-Ray Materials      Gadolinium      Social History   Social History     Tobacco Use     Smoking status: Never Smoker     Smokeless tobacco: Never Used   Substance Use Topics     Alcohol use: Not Currently     Alcohol/week: 0.0 standard drinks     Drug use: Never      Past medical history and social history were reviewed.    Physical Examination:  BP (!) 146/87 (BP Location: Left arm, Patient Position: Chair, Cuff Size: Adult Regular)   Pulse 110   Temp 98.4  F (36.9  C) (Oral)   Resp 18   Wt 77.1 kg (170 lb)   SpO2 92%   BMI 29.18 kg/m    Wt Readings from Last 10 Encounters:   12/20/21 77.1 kg (170 lb)   12/15/21 77.1 kg (170 lb)   12/12/21 77.1 kg (170 lb)   12/10/21 77.1 kg (170 lb)   12/03/21 77.1 kg (170 lb)   12/01/21 77.1 kg (170 lb)   11/29/21 77.1 kg (170 lb)   11/28/21 77.1 kg (170 lb)   11/25/21 77.1 kg (170 lb)   11/24/21 76.5 kg (168 lb 9.6 oz)       General: tearful initially, though she was able to calm down throughout vision.   Skin: No visualized rash or lesions on visualized skin  Eyes: EOMI, mild sclera  icterus, no discharge noted  Resp: Appears to be breathing comfortably without accessory muscle usage, speaking in full sentences, no cough  MSK: Appears to have normal range of motion based on visualized movements  Neurologic: No apparent tremors, facial movements symmetric, known right sided weakness with wrist contracture and right-sided limp unchanged.  Psych: affect somewhat labile, mostly tearful, but she has good insight.    Laboratory Data:        Assessment and Plan:  1. Sickle Cell Disease  HgbSS complicated by hx stroke, acute chest, iron overload, chronic pain, acute on chronic pulmonary embolism with multiple hospitalizations. She has been in ED or infusion many times in the last several weeks for pain crisis which she attributes to weather.      Labs: Reviewed       Meds: Continue Hydrea 2000mg daily (refilled).      Pain Control: Continue MSContin 15mg BID. Continue Oxycodone to 15mg PRN max 6 per day-#50 tabs per week. DO NOT INCREASE. Continue Cymbalta 30mg BID. Continue Tylenol PRN.      Continue plan to avoid IV narcotics as able in ED to discourage frequent ED usage.      Follow-up: Continue ANDREAS or MD monthly    Iron overload: continue Desferal and Jadenu at current dosing. Ferriscan ordered for January.     2. Neuro  History of stroke. Off ASA now that she is on warfarin.      Dr. Pierce doing botox for spasticity and symptoms improving.      Migraines, chronic issue, saw neurology. Has follow-up with specialist scheduled in Jan     3. Psych  History of anxiety and depression. Feels worse. She does not feel the sertraline works so we will wean back off. Plan is to reduce by 50 mg weekly starting tomorrow. We will need to continue to explore optionsContinue Abilify.     Follows with outside therapist.     OK to use Benadryl PRN insomnia.      4. Pulm  History of asthma, continue Symbicort and Albuterol PRN. Pulm visit scheduled. Has home O2. Will see about getting her a home pulse oximeter. O2 92%  on RA today.      5. Vascular  Acute on chronic PE with DOAC failure, now on warfarin. Anticoag clinic managing. Dyspnea improved.  We talked about the food that she was eating.  I do think that her green vegetable intake may be affecting her INR levels.  I recommended that she either eat similar kinds of food each day or that she dropped those green leafy vegetables.  I gave her a list of alternative options that are less likely to affect the warfarin.  I am also going to recommend to the anticoagulation clinic that we do not do an alternating dose.  It adds complication to her hefty dose of daily medications by needing to alternate pill tablets.  Likely safer to just do straightforward single dose every day.    Will work with IR about way to evaluate port with contrast allergy.  She is worried about her keloiding whether she can get a new port placed somewhere else.  I have not had occasion to discuss this with IR or anybody else yet.    35 minutes spent on the date of the encounter doing chart review, review of test results, interpretation of tests, patient visit and documentation         Eric Duncan MD  Hematologist  Division of Hematology, Oncology, and Transplantation  [facility]  [facility]  Pager: [phone]          Eric Duncan MD

## 2021-12-20 NOTE — PROGRESS NOTES
Social Work Note: Telephone Call  Oncology Clinic     Data/Intervention:  Patient Name:  Jennifer Cervantes  /Age: 2000, 21 years old      Call From: [facility] Staff        Reason for Call:  Transportation     Assessment:   called [employer] to arrange ride through patient's insurance for their appointment tomorrow on 2021 at 9 am. [employer] arranged  for patient from home with [employer] ([phone]).  Patient will need to call when ready for return ride home. Patient encouraged to call for any issues with transportation.      Plan:  Patient is aware of the transportation plan.  available to assist with any other needs.      CARLOS Chavez,SW  Hematology/Oncology Social Worker  Phone:[phone] Pager: [phone]

## 2021-12-20 NOTE — PROGRESS NOTES
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      She was admitted 2/1/21-2/3/21 with a new PE, started on Rivaroxaban. Switched to Eliquis 3/25/21 with RUE DVT.     She was admitted 4/26/21-5/11/21 with sickle cell pain crisis complicated by worsening PE in setting of low Apixaban levels, acute hypoxic respiratory failure, pneumonia, acute chest syndrome s/p exchange transfusion on 4/30 and 5/4. After 2nd exchange her oxygen requirement dramatically improved from 20L to 1-2L 5/5 and she was off oxygen as of 5/6.      She was admitted 7/13/21-7/25/21 for acute on chronic PE, switched to dabigitran + ASA.      Due to worsening hypoxia she underwent VQ scan 11/10/21 which showed acute on chronic PE for which she was admitted 11/10-11/21. During admission was switched to warfarin. Echo WNL and no signs of pulm HTN on right heart cath. She did receive 1 unit pRBC for hgb 6.4 during admission.    She is following up for pain management and anticoagulation.    Interval History:  Jennifer is seeing me for the first time in clinic for our first follow up in about 2 months. She said the pain is really bad right now with the cold weather. She has been in infusion or the ED for most of the days in the past month. She is taking her oxycodone every 6 hours as is allowed in her PRN prescription. She said the pain is in her back, sometimes in her chest, but she is not having dyspnea. She does say that she has not had any bleeding despite being on  warfarin. Her INR has been on the low side for a number of days.  Anticoagulation clinic has struggled to get her up to her normal dose.  She is alternating 5 mg and 7.5 mg each day.  She denies missing any doses.  We did talk a little bit more about the food she eats, since I had previously looked and figure out whether any of her medicines interact with the warfarin.  I did not find any major interactions, certainly none that would cause increased metabolism, but it does sound like that she tries to eat some fruits and vegetables particularly cooked broccoli many days, and that may be one of her reasons why her INR has been subtherapeutic.  Otherwise, she has not had any other big changes to her diet or new meds.  She has back on her Jadenu and Desferal.  There were previous insurance issues and the Jadenu was not getting mailed to her because they could not reach her.  Now she is in a be picking up at our pharmacy is she gets other meds here and she is here frequently enough.      We did talk about support at home.  She said that her mom has been really stressed with moving, and she also tends to get triggered by a lot of the health issues that Jennifer is having.  Per Jennifer, her mom's response to that stress is actually to pull herself further away from caring that history made a lot of her care more challenging in this very difficult past year.  She does think her mom is supportive overall, but just does not know how to connect with her to be helpful.  Finally, she did bring up that while her port is not tilted, she has a lot of keloid over it and she is concerned about that.  She wants to see.  If she can get a different port placement.  We also talked a little bit about her mood and she is not sure her sertraline is working.  She has found very little difference despite her moderate current dosing.      Current Outpatient Medications   Medication Sig Dispense Refill     acetaminophen (TYLENOL) 325 MG tablet  Take 2 tablets (650 mg) by mouth every 6 hours as needed for mild pain 120 tablet 3     albuterol (PROAIR HFA/PROVENTIL HFA/VENTOLIN HFA) 108 (90 Base) MCG/ACT inhaler Inhale 2 puffs into the lungs every 6 hours as needed for shortness of breath / dyspnea or wheezing 8.5 g 3     albuterol (PROVENTIL) (2.5 MG/3ML) 0.083% neb solution Take 1 vial (2.5 mg) by nebulization every 6 hours as needed for shortness of breath / dyspnea or wheezing 12 mL 4     ARIPiprazole (ABILIFY) 2 MG tablet Take 1 tablet (2 mg) by mouth daily 30 tablet 3     budesonide-formoterol (SYMBICORT) 160-4.5 MCG/ACT Inhaler Inhale 2 puffs into the lungs 2 times daily 10.2 g 3     deferasirox (JADENU) 360 MG tablet Take 4 tablets (1,440 mg) by mouth every evening 120 tablet 4     diphenhydrAMINE (BENADRYL) 25 MG capsule Take 1-2 capsules (25-50 mg) by mouth nightly as needed for sleep 60 capsule 3     EPINEPHrine (ANY BX GENERIC EQUIV) 0.3 MG/0.3ML injection 2-pack Inject 0.3 mLs (0.3 mg) into the muscle as needed for anaphylaxis 1 each 1     Hydroxyurea 1000 MG TABS Take 2,000 mg by mouth daily 60 tablet 3     hydrOXYzine (ATARAX) 25 MG tablet Take 1 tablet (25 mg) by mouth 3 times daily as needed for anxiety 30 tablet 1     [START ON 12/28/2021] morphine (MS CONTIN) 15 MG CR tablet Take 1 tablet (15 mg) by mouth every 12 hours 60 tablet 0     naloxone (NARCAN) 4 MG/0.1ML nasal spray Spray 1 spray (4 mg) into one nostril alternating nostrils once as needed for opioid reversal every 2-3 minutes until assistance arrives 0.2 mL 1     omeprazole (PRILOSEC) 20 MG DR capsule Take 1 capsule (20 mg) by mouth daily 30 capsule 1     ondansetron (ZOFRAN) 8 MG tablet Take 1 tablet (8 mg) by mouth every 8 hours as needed 30 tablet 1     oxyCODONE IR (ROXICODONE) 15 MG tablet Take 1 tablet (15 mg) by mouth every 4 hours as needed for severe pain Goal 4 per day. Max 6 per day. 50 tablet 0     sertraline (ZOLOFT) 100 MG tablet Take 2 tablets (200 mg) by mouth  daily 60 tablet 3     warfarin ANTICOAGULANT (COUMADIN) 5 MG tablet Take 1 tablet (5 mg) by mouth four times a week Take 5 mg on Tues, Thurs, Sat, Sun. INR check on Tues 11/23 so dose can change. 16 tablet 0     warfarin ANTICOAGULANT (COUMADIN) 7.5 MG tablet Take 1 tablet (7.5 mg) by mouth three times a week Take 7.5 mg on Mon, Wed, Fri. INR check on Tues 11/23 so dose can change. 12 tablet 0     lidocaine-prilocaine (EMLA) 2.5-2.5 % external cream Apply topically once for 1 dose Use for port site as needed (Patient not taking: Reported on 11/10/2021) 30 g 1     medroxyPROGESTERone (DEPO-PROVERA) 150 MG/ML IM injection Inject 150 mg into the muscle         Past Medical History  Past Medical History:   Diagnosis Date     Anxiety      Bleeding disorder (H)      Blood clotting disorder (H)      Cerebral infarction (H) 2015     Cognitive developmental delay     low IQ. 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 (H)     right hand contractures     Iron overload due to repeated red blood cell transfusions      Migraines      Multiple subsegmental pulmonary emboli without acute cor pulmonale (H) 02/01/2021     Oppositional defiant behavior      Superficial venous thrombosis of arm, right 03/25/2021     Uncomplicated asthma      Past Surgical History:   Procedure Laterality Date     AS INSERT TUNNELED CV 2 CATH W/O PORT/PUMP       C BREAST REDUCTION (INCLUDES LIPO) TIER 3 Bilateral 04/23/2019     CHOLECYSTECTOMY       CV RIGHT HEART CATH MEASUREMENTS RECORDED N/A 11/18/2021    Procedure: Right Heart Cath;  Surgeon: Jackson Stauffer MD;  Location:  HEART CARDIAC CATH LAB     INSERT PORT VASCULAR ACCESS Left 4/21/2021    Procedure: INSERTION, VASCULAR ACCESS PORT (NOT SURE ON SIDE UNTIL REMOVAL);  Surgeon: Rajan More MD;  Location: [facility] OR     IR CHEST PORT PLACEMENT > 5 YRS OF AGE   4/21/2021     IR CVC NON TUNNEL LINE REMOVAL  5/6/2021     IR CVC NON TUNNEL PLACEMENT  04/07/2020     IR CVC NON TUNNEL PLACEMENT  4/30/2021     IR PORT REMOVAL LEFT  4/21/2021     REMOVE PORT VASCULAR ACCESS Left 4/21/2021    Procedure: REMOVAL, VASCULAR ACCESS PORT LEFT;  Surgeon: Rajan More MD;  Location: [facility] OR     REPAIR TENDON ELBOW Right 10/02/2019    Procedure: Right Elbow Flexor Lengthening, Flexor Pronator Slide Of Wrist and Finger, Thumb Adductor Lengthening;  Surgeon: Anai Franco MD;  Location: [facility] OR     TONSILLECTOMY Bilateral 10/02/2019    Procedure: Bilateral Tonsillectomy;  Surgeon: Farhana Guy MD;  Location: [facility] OR     Allergies   Allergen Reactions     Contrast Dye      Hives and breathing issues     Fish-Derived Products Hives     Seafood Hives     Diagnostic X-Ray Materials      Gadolinium      Social History   Social History     Tobacco Use     Smoking status: Never Smoker     Smokeless tobacco: Never Used   Substance Use Topics     Alcohol use: Not Currently     Alcohol/week: 0.0 standard drinks     Drug use: Never      Past medical history and social history were reviewed.    Physical Examination:  BP (!) 146/87 (BP Location: Left arm, Patient Position: Chair, Cuff Size: Adult Regular)   Pulse 110   Temp 98.4  F (36.9  C) (Oral)   Resp 18   Wt 77.1 kg (170 lb)   SpO2 92%   BMI 29.18 kg/m    Wt Readings from Last 10 Encounters:   12/20/21 77.1 kg (170 lb)   12/15/21 77.1 kg (170 lb)   12/12/21 77.1 kg (170 lb)   12/10/21 77.1 kg (170 lb)   12/03/21 77.1 kg (170 lb)   12/01/21 77.1 kg (170 lb)   11/29/21 77.1 kg (170 lb)   11/28/21 77.1 kg (170 lb)   11/25/21 77.1 kg (170 lb)   11/24/21 76.5 kg (168 lb 9.6 oz)       General: tearful initially, though she was able to calm down throughout vision.   Skin: No visualized rash or lesions on visualized skin  Eyes: EOMI, mild sclera icterus, no discharge noted  Resp: Appears to be breathing comfortably without  accessory muscle usage, speaking in full sentences, no cough  MSK: Appears to have normal range of motion based on visualized movements  Neurologic: No apparent tremors, facial movements symmetric, known right sided weakness with wrist contracture and right-sided limp unchanged.  Psych: affect somewhat labile, mostly tearful, but she has good insight.    Laboratory Data:        Assessment and Plan:  1. Sickle Cell Disease  HgbSS complicated by hx stroke, acute chest, iron overload, chronic pain, acute on chronic pulmonary embolism with multiple hospitalizations. She has been in ED or infusion many times in the last several weeks for pain crisis which she attributes to weather.      Labs: Reviewed       Meds: Continue Hydrea 2000mg daily (refilled).      Pain Control: Continue MSContin 15mg BID. Continue Oxycodone to 15mg PRN max 6 per day-#50 tabs per week. DO NOT INCREASE. Continue Cymbalta 30mg BID. Continue Tylenol PRN.      Continue plan to avoid IV narcotics as able in ED to discourage frequent ED usage.      Follow-up: Continue ANDREAS or MD monthly    Iron overload: continue Desferal and Jadenu at current dosing. Ferriscan ordered for January.     2. Neuro  History of stroke. Off ASA now that she is on warfarin.      Dr. Pierce doing botox for spasticity and symptoms improving.      Migraines, chronic issue, saw neurology. Has follow-up with specialist scheduled in Jan     3. Psych  History of anxiety and depression. Feels worse. She does not feel the sertraline works so we will wean back off. Plan is to reduce by 50 mg weekly starting tomorrow. We will need to continue to explore optionsContinue Abilify.     Follows with outside therapist.     OK to use Benadryl PRN insomnia.      4. Pulm  History of asthma, continue Symbicort and Albuterol PRN. Pulm visit scheduled. Has home O2. Will see about getting her a home pulse oximeter. O2 92% on RA today.      5. Vascular  Acute on chronic PE with DOAC failure, now on  warfarin. Anticoag clinic managing. Dyspnea improved.  We talked about the food that she was eating.  I do think that her green vegetable intake may be affecting her INR levels.  I recommended that she either eat similar kinds of food each day or that she dropped those green leafy vegetables.  I gave her a list of alternative options that are less likely to affect the warfarin.  I am also going to recommend to the anticoagulation clinic that we do not do an alternating dose.  It adds complication to her hefty dose of daily medications by needing to alternate pill tablets.  Likely safer to just do straightforward single dose every day.    Will work with IR about way to evaluate port with contrast allergy.  She is worried about her keloiding whether she can get a new port placed somewhere else.  I have not had occasion to discuss this with IR or anybody else yet.    35 minutes spent on the date of the encounter doing chart review, review of test results, interpretation of tests, patient visit and documentation         Eric Duncan MD  Hematologist  Division of Hematology, Oncology, and Transplantation  [facility]  [facility]  Pager: [phone]

## 2021-12-20 NOTE — NURSING NOTE
Chief Complaint   Patient presents with     Labs Only     Labs drawn from port by RN in lab.      Port accessed with 20g gripper needle by RN, labs collected, line flushed with saline and heparin.    Kaushik Hook RN

## 2021-12-20 NOTE — PROGRESS NOTES
Nursing Note  Jennifer Cervantes presents today to [facility] for:   Chief Complaint   Patient presents with     Infusion     IVF     During today's [facility] appointment, orders from Dr. Duncan were completed.  Frequency: once and as needed    Progress note:  Patient identification verified by name and date of birth.  Assessment completed.  Vitals recorded in Doc Flowsheets.  Patient was provided with education regarding medication/procedure and possible side effects.  Patient verbalized understanding.     present during visit today: Not Applicable.    Treatment Conditions: Non-applicable. Pt reporting 10/10 pain to lower back initially. Gave Morphine 2mg every hour x 3 doses.     Infusion length and rate:  infusion given over approximately 2 hours    Labs: were drawn prior to appointment earlier today.    Vascular access: port accessed today.    Is the next appt scheduled? no  Asymptomatic COVID test completed? no    Post Infusion Assessment:  Patient tolerated infusion without incident.     Discharge Plan:   Follow up plan of care with: ordering provider as scheduled.  Discharge instructions were reviewed with patient.  Patient/representative verbalized understanding of discharge instructions and all questions answered.  Patient discharged from [facility] in stable condition.    Patricia Qureshi RN    Administrations This Visit     dextrose 5% and 0.45% NaCl BOLUS     Admin Date  12/20/2021 Action  New Bag Dose  500 mL Rate  250 mL/hr Route  Intravenous Administered By  Patricia Qureshi RN          heparin 100 UNIT/ML injection 5 mL     Admin Date  12/20/2021 Action  Given Dose  5 mL Route  Intracatheter Administered By  Yina Ford RN          morphine (PF) injection 2 mg     Admin Date  12/20/2021 Action  Given Dose  2 mg Route  Intravenous Administered By  Patricia Qureshi RN           Admin Date  12/20/2021  Action  Given Dose  2 mg Route  Intravenous Administered By  Patricia Qureshi RN           Admin Date  12/20/2021 Action  Given Dose  2 mg Route  Intravenous Administered By  Yina Ford, RN                /81   Pulse 99   Resp 18   SpO2 95%

## 2021-12-20 NOTE — NURSING NOTE
"Oncology Rooming Note    December 20, 2021 12:30 PM   Jennifer Cervantes is a 21 year old female who presents for:    No chief complaint on file.    Initial Vitals: BP (!) 146/87 (BP Location: Left arm, Patient Position: Chair, Cuff Size: Adult Regular)   Pulse 110   Temp 98.4  F (36.9  C) (Oral)   Resp 18   Wt 77.1 kg (170 lb)   SpO2 92%   BMI 29.18 kg/m   Estimated body mass index is 29.18 kg/m  as calculated from the following:    Height as of 12/15/21: 1.626 m (5' 4\").    Weight as of this encounter: 77.1 kg (170 lb). Body surface area is 1.87 meters squared.  Worst Pain (10) Comment: Data Unavailable   No LMP recorded. Patient has had an injection.  Allergies reviewed: Yes  Medications reviewed: Yes    Medications: Medication refills not needed today.  Pharmacy name entered into TelemetryWeb:    [facility] - [place], [place] - [address] [facility] [place], [place] - [phone]    Clinical concerns: Patient crying in pain 10/10 lower back and oxygen level is at 92%. Perla was notified.      Shahriar Sheldon LPN              "

## 2021-12-20 NOTE — LETTER
12/20/2021         RE: Jennifer Cervantes  [address]  [facility] [address]        Dear Colleague,    Thank you for referring your patient, Jennifer Cervantes, to the [facility]. Please see a copy of my visit note below.    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      She was admitted 2/1/21-2/3/21 with a new PE, started on Rivaroxaban. Switched to Eliquis 3/25/21 with RUE DVT.     She was admitted 4/26/21-5/11/21 with sickle cell pain crisis complicated by worsening PE in setting of low Apixaban levels, acute hypoxic respiratory failure, pneumonia, acute chest syndrome s/p exchange transfusion on 4/30 and 5/4. After 2nd exchange her oxygen requirement dramatically improved from 20L to 1-2L 5/5 and she was off oxygen as of 5/6.      She was admitted 7/13/21-7/25/21 for acute on chronic PE, switched to dabigitran + ASA.      Due to worsening hypoxia she underwent VQ scan 11/10/21 which showed acute on chronic PE for which she was admitted 11/10-11/21. During admission was switched to warfarin. Echo WNL and no signs of pulm HTN on right heart cath. She did receive 1 unit pRBC for hgb 6.4 during admission.    She is following up for pain management and anticoagulation.    Interval History:  Jennifer is seeing me for the first time in clinic for our first follow up in about 2 months. She said the pain is really bad right now with the cold weather. She has been in infusion or the ED for most of the  days in the past month. She is taking her oxycodone every 6 hours as is allowed in her PRN prescription. She said the pain is in her back, sometimes in her chest, but she is not having dyspnea. She does say that she has not had any bleeding despite being on warfarin. Her INR has been on the low side for a number of days.  Anticoagulation clinic has struggled to get her up to her normal dose.  She is alternating 5 mg and 7.5 mg each day.  She denies missing any doses.  We did talk a little bit more about the food she eats, since I had previously looked and figure out whether any of her medicines interact with the warfarin.  I did not find any major interactions, certainly none that would cause increased metabolism, but it does sound like that she tries to eat some fruits and vegetables particularly cooked broccoli many days, and that may be one of her reasons why her INR has been subtherapeutic.  Otherwise, she has not had any other big changes to her diet or new meds.  She has back on her Jadenu and Desferal.  There were previous insurance issues and the Jadenu was not getting mailed to her because they could not reach her.  Now she is in a be picking up at our pharmacy is she gets other meds here and she is here frequently enough.      We did talk about support at home.  She said that her mom has been really stressed with moving, and she also tends to get triggered by a lot of the health issues that Jennifer is having.  Per Jennifer, her mom's response to that stress is actually to pull herself further away from caring that history made a lot of her care more challenging in this very difficult past year.  She does think her mom is supportive overall, but just does not know how to connect with her to be helpful.  Finally, she did bring up that while her port is not tilted, she has a lot of keloid over it and she is concerned about that.  She wants to see.  If she can get a different port placement.  We also talked a  little bit about her mood and she is not sure her sertraline is working.  She has found very little difference despite her moderate current dosing.      Current Outpatient Medications   Medication Sig Dispense Refill     acetaminophen (TYLENOL) 325 MG tablet Take 2 tablets (650 mg) by mouth every 6 hours as needed for mild pain 120 tablet 3     albuterol (PROAIR HFA/PROVENTIL HFA/VENTOLIN HFA) 108 (90 Base) MCG/ACT inhaler Inhale 2 puffs into the lungs every 6 hours as needed for shortness of breath / dyspnea or wheezing 8.5 g 3     albuterol (PROVENTIL) (2.5 MG/3ML) 0.083% neb solution Take 1 vial (2.5 mg) by nebulization every 6 hours as needed for shortness of breath / dyspnea or wheezing 12 mL 4     ARIPiprazole (ABILIFY) 2 MG tablet Take 1 tablet (2 mg) by mouth daily 30 tablet 3     budesonide-formoterol (SYMBICORT) 160-4.5 MCG/ACT Inhaler Inhale 2 puffs into the lungs 2 times daily 10.2 g 3     deferasirox (JADENU) 360 MG tablet Take 4 tablets (1,440 mg) by mouth every evening 120 tablet 4     diphenhydrAMINE (BENADRYL) 25 MG capsule Take 1-2 capsules (25-50 mg) by mouth nightly as needed for sleep 60 capsule 3     EPINEPHrine (ANY BX GENERIC EQUIV) 0.3 MG/0.3ML injection 2-pack Inject 0.3 mLs (0.3 mg) into the muscle as needed for anaphylaxis 1 each 1     Hydroxyurea 1000 MG TABS Take 2,000 mg by mouth daily 60 tablet 3     hydrOXYzine (ATARAX) 25 MG tablet Take 1 tablet (25 mg) by mouth 3 times daily as needed for anxiety 30 tablet 1     [START ON 12/28/2021] morphine (MS CONTIN) 15 MG CR tablet Take 1 tablet (15 mg) by mouth every 12 hours 60 tablet 0     naloxone (NARCAN) 4 MG/0.1ML nasal spray Spray 1 spray (4 mg) into one nostril alternating nostrils once as needed for opioid reversal every 2-3 minutes until assistance arrives 0.2 mL 1     omeprazole (PRILOSEC) 20 MG DR capsule Take 1 capsule (20 mg) by mouth daily 30 capsule 1     ondansetron (ZOFRAN) 8 MG tablet Take 1 tablet (8 mg) by mouth every 8  hours as needed 30 tablet 1     oxyCODONE IR (ROXICODONE) 15 MG tablet Take 1 tablet (15 mg) by mouth every 4 hours as needed for severe pain Goal 4 per day. Max 6 per day. 50 tablet 0     sertraline (ZOLOFT) 100 MG tablet Take 2 tablets (200 mg) by mouth daily 60 tablet 3     warfarin ANTICOAGULANT (COUMADIN) 5 MG tablet Take 1 tablet (5 mg) by mouth four times a week Take 5 mg on Tues, Thurs, Sat, Sun. INR check on Tues 11/23 so dose can change. 16 tablet 0     warfarin ANTICOAGULANT (COUMADIN) 7.5 MG tablet Take 1 tablet (7.5 mg) by mouth three times a week Take 7.5 mg on Mon, Wed, Fri. INR check on Tues 11/23 so dose can change. 12 tablet 0     lidocaine-prilocaine (EMLA) 2.5-2.5 % external cream Apply topically once for 1 dose Use for port site as needed (Patient not taking: Reported on 11/10/2021) 30 g 1     medroxyPROGESTERone (DEPO-PROVERA) 150 MG/ML IM injection Inject 150 mg into the muscle         Past Medical History  Past Medical History:   Diagnosis Date     Anxiety      Bleeding disorder (H)      Blood clotting disorder (H)      Cerebral infarction (H) 2015     Cognitive developmental delay     low IQ. 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 (H)     right hand contractures     Iron overload due to repeated red blood cell transfusions      Migraines      Multiple subsegmental pulmonary emboli without acute cor pulmonale (H) 02/01/2021     Oppositional defiant behavior      Superficial venous thrombosis of arm, right 03/25/2021     Uncomplicated asthma      Past Surgical History:   Procedure Laterality Date     AS INSERT TUNNELED CV 2 CATH W/O PORT/PUMP       C BREAST REDUCTION (INCLUDES LIPO) TIER 3 Bilateral 04/23/2019     CHOLECYSTECTOMY       CV RIGHT HEART CATH MEASUREMENTS RECORDED N/A 11/18/2021    Procedure: Right Heart Cath;  Surgeon: Jackson Stauffer MD;   Location:  HEART CARDIAC CATH LAB     INSERT PORT VASCULAR ACCESS Left 4/21/2021    Procedure: INSERTION, VASCULAR ACCESS PORT (NOT SURE ON SIDE UNTIL REMOVAL);  Surgeon: Rajan More MD;  Location: [facility] OR     IR CHEST PORT PLACEMENT > 5 YRS OF AGE  4/21/2021     IR CVC NON TUNNEL LINE REMOVAL  5/6/2021     IR CVC NON TUNNEL PLACEMENT  04/07/2020     IR CVC NON TUNNEL PLACEMENT  4/30/2021     IR PORT REMOVAL LEFT  4/21/2021     REMOVE PORT VASCULAR ACCESS Left 4/21/2021    Procedure: REMOVAL, VASCULAR ACCESS PORT LEFT;  Surgeon: Rajan More MD;  Location: [facility] OR     REPAIR TENDON ELBOW Right 10/02/2019    Procedure: Right Elbow Flexor Lengthening, Flexor Pronator Slide Of Wrist and Finger, Thumb Adductor Lengthening;  Surgeon: Anai Franco MD;  Location: [facility] OR     TONSILLECTOMY Bilateral 10/02/2019    Procedure: Bilateral Tonsillectomy;  Surgeon: Farhana Guy MD;  Location: [facility] OR     Allergies   Allergen Reactions     Contrast Dye      Hives and breathing issues     Fish-Derived Products Hives     Seafood Hives     Diagnostic X-Ray Materials      Gadolinium      Social History   Social History     Tobacco Use     Smoking status: Never Smoker     Smokeless tobacco: Never Used   Substance Use Topics     Alcohol use: Not Currently     Alcohol/week: 0.0 standard drinks     Drug use: Never      Past medical history and social history were reviewed.    Physical Examination:  BP (!) 146/87 (BP Location: Left arm, Patient Position: Chair, Cuff Size: Adult Regular)   Pulse 110   Temp 98.4  F (36.9  C) (Oral)   Resp 18   Wt 77.1 kg (170 lb)   SpO2 92%   BMI 29.18 kg/m    Wt Readings from Last 10 Encounters:   12/20/21 77.1 kg (170 lb)   12/15/21 77.1 kg (170 lb)   12/12/21 77.1 kg (170 lb)   12/10/21 77.1 kg (170 lb)   12/03/21 77.1 kg (170 lb)   12/01/21 77.1 kg (170 lb)   11/29/21 77.1 kg (170 lb)   11/28/21 77.1 kg (170 lb)   11/25/21 77.1 kg (170 lb)   11/24/21 76.5 kg (168 lb 9.6  oz)       General: tearful initially, though she was able to calm down throughout vision.   Skin: No visualized rash or lesions on visualized skin  Eyes: EOMI, mild sclera icterus, no discharge noted  Resp: Appears to be breathing comfortably without accessory muscle usage, speaking in full sentences, no cough  MSK: Appears to have normal range of motion based on visualized movements  Neurologic: No apparent tremors, facial movements symmetric, known right sided weakness with wrist contracture and right-sided limp unchanged.  Psych: affect somewhat labile, mostly tearful, but she has good insight.    Laboratory Data:        Assessment and Plan:  1. Sickle Cell Disease  HgbSS complicated by hx stroke, acute chest, iron overload, chronic pain, acute on chronic pulmonary embolism with multiple hospitalizations. She has been in ED or infusion many times in the last several weeks for pain crisis which she attributes to weather.      Labs: Reviewed       Meds: Continue Hydrea 2000mg daily (refilled).      Pain Control: Continue MSContin 15mg BID. Continue Oxycodone to 15mg PRN max 6 per day-#50 tabs per week. DO NOT INCREASE. Continue Cymbalta 30mg BID. Continue Tylenol PRN.      Continue plan to avoid IV narcotics as able in ED to discourage frequent ED usage.      Follow-up: Continue ANDREAS or MD monthly    Iron overload: continue Desferal and Jadenu at current dosing. Ferriscan ordered for January.     2. Neuro  History of stroke. Off ASA now that she is on warfarin.      Dr. Pierce doing botox for spasticity and symptoms improving.      Migraines, chronic issue, saw neurology. Has follow-up with specialist scheduled in Jan     3. Psych  History of anxiety and depression. Feels worse. She does not feel the sertraline works so we will wean back off. Plan is to reduce by 50 mg weekly starting tomorrow. We will need to continue to explore optionsContinue Abilify.     Follows with outside therapist.     OK to use Benadryl PRN  insomnia.      4. Pulm  History of asthma, continue Symbicort and Albuterol PRN. Pulm visit scheduled. Has home O2. Will see about getting her a home pulse oximeter. O2 92% on RA today.      5. Vascular  Acute on chronic PE with DOAC failure, now on warfarin. Anticoag clinic managing. Dyspnea improved.  We talked about the food that she was eating.  I do think that her green vegetable intake may be affecting her INR levels.  I recommended that she either eat similar kinds of food each day or that she dropped those green leafy vegetables.  I gave her a list of alternative options that are less likely to affect the warfarin.  I am also going to recommend to the anticoagulation clinic that we do not do an alternating dose.  It adds complication to her hefty dose of daily medications by needing to alternate pill tablets.  Likely safer to just do straightforward single dose every day.    Will work with IR about way to evaluate port with contrast allergy.  She is worried about her keloiding whether she can get a new port placed somewhere else.  I have not had occasion to discuss this with IR or anybody else yet.    35 minutes spent on the date of the encounter doing chart review, review of test results, interpretation of tests, patient visit and documentation         Eric Duncan MD  Hematologist  Division of Hematology, Oncology, and Transplantation  [facility]  [facility]  Pager: [phone]        Again, thank you for allowing me to participate in the care of your patient.        Sincerely,        Eric Duncan MD

## 2021-12-20 NOTE — ED NOTES
I took signout on the patient from Dr. Miramontes.  This is a 21 year old with a history of sickle cell disease who presents with sickle cell pain crisis.  Lab work is at baseline.  Patient was given medication per her care plan.  Patient had some improvement in her symptoms and is requesting discharge home. Will discharge home with return precautions. Discussed reasons to return to the emergency department.  Patient understands and agrees with this plan.     Jitendra Brandon, DO  12/20/21 [id number]

## 2021-12-20 NOTE — CONFIDENTIAL NOTE
Pt called in to triage requesting IVF pain meds for lower back and pelvic area pain rated 9/10 x 1 days. Stated last took prn MS Contin aand oxycodone at 6 am this morning without relief. Denied any fevers, chills, cough, sob, chest pain, numbness or tingling or other symptoms not typical of sickle cell pain.   Pt's last infusion was 12/17/21, last clinic visit 11/30/21  with follow-up on 12/20/21 with Dr Duncan.   Patient states they do have own ride and it will take 25 minutes long to get to cancer clinic.     If you do not hear from the infusion center by 2pm then you will not be able to get in for an infusion today.   Please note, if you are late for your appt, you risk losing your infusion appt as it may delay another patient's infusion who arrived on time.     Patient does not qualify per protocol.   Paged Dr Duncan at 10:03   10:33 Dr Duncan called back.   Ok for IVF/Pain today.     2:30 appointment available for IVF/pain     Call placed to Jennifer and she will turn around and come in for the appointment.     Message sent to  to schedule appointment

## 2021-12-21 ENCOUNTER — PATIENT OUTREACH (OUTPATIENT)
Dept: CARE COORDINATION | Facility: CLINIC | Age: 21
End: 2021-12-21

## 2021-12-21 ENCOUNTER — INFUSION THERAPY VISIT (OUTPATIENT)
Dept: INFUSION THERAPY | Facility: CLINIC | Age: 21
End: 2021-12-21
Attending: INTERNAL MEDICINE
Payer: COMMERCIAL

## 2021-12-21 ENCOUNTER — TELEPHONE (OUTPATIENT)
Dept: ONCOLOGY | Facility: CLINIC | Age: 21
End: 2021-12-21
Payer: COMMERCIAL

## 2021-12-21 ENCOUNTER — VIRTUAL VISIT (OUTPATIENT)
Dept: CARDIOLOGY | Facility: CLINIC | Age: 21
End: 2021-12-21
Attending: INTERNAL MEDICINE
Payer: COMMERCIAL

## 2021-12-21 VITALS
OXYGEN SATURATION: 95 % | RESPIRATION RATE: 16 BRPM | SYSTOLIC BLOOD PRESSURE: 117 MMHG | TEMPERATURE: 98.5 F | DIASTOLIC BLOOD PRESSURE: 61 MMHG | HEART RATE: 104 BPM

## 2021-12-21 DIAGNOSIS — I82.629 DEEP VEIN THROMBOSIS (DVT) OF UPPER EXTREMITY, UNSPECIFIED CHRONICITY, UNSPECIFIED LATERALITY, UNSPECIFIED VEIN (H): Primary | ICD-10-CM

## 2021-12-21 DIAGNOSIS — D57.01 SICKLE CELL DISEASE, WITH ACUTE CHEST SYNDROME (H): ICD-10-CM

## 2021-12-21 DIAGNOSIS — R07.9 CHEST PAIN, UNSPECIFIED TYPE: ICD-10-CM

## 2021-12-21 DIAGNOSIS — D57.00 SICKLE CELL PAIN CRISIS (H): ICD-10-CM

## 2021-12-21 DIAGNOSIS — G81.10 SPASTIC HEMIPLEGIA, UNSPECIFIED ETIOLOGY, UNSPECIFIED LATERALITY (H): Primary | ICD-10-CM

## 2021-12-21 PROCEDURE — 99204 OFFICE O/P NEW MOD 45 MIN: CPT | Mod: TEL | Performed by: INTERNAL MEDICINE

## 2021-12-21 PROCEDURE — 96374 THER/PROPH/DIAG INJ IV PUSH: CPT

## 2021-12-21 PROCEDURE — 96361 HYDRATE IV INFUSION ADD-ON: CPT

## 2021-12-21 PROCEDURE — 96376 TX/PRO/DX INJ SAME DRUG ADON: CPT

## 2021-12-21 PROCEDURE — 250N000011 HC RX IP 250 OP 636: Performed by: PEDIATRICS

## 2021-12-21 PROCEDURE — 258N000003 HC RX IP 258 OP 636: Performed by: PEDIATRICS

## 2021-12-21 RX ORDER — MORPHINE SULFATE 2 MG/ML
2 INJECTION, SOLUTION INTRAMUSCULAR; INTRAVENOUS
Status: DISCONTINUED | OUTPATIENT
Start: 2021-12-21 | End: 2021-12-21 | Stop reason: HOSPADM

## 2021-12-21 RX ORDER — ONDANSETRON 8 MG/1
8 TABLET, ORALLY DISINTEGRATING ORAL
Status: DISCONTINUED | OUTPATIENT
Start: 2021-12-21 | End: 2021-12-21 | Stop reason: HOSPADM

## 2021-12-21 RX ORDER — HEPARIN SODIUM,PORCINE 10 UNIT/ML
5 VIAL (ML) INTRAVENOUS
Status: CANCELLED | OUTPATIENT
Start: 2022-01-01

## 2021-12-21 RX ORDER — MORPHINE SULFATE 2 MG/ML
2 INJECTION, SOLUTION INTRAMUSCULAR; INTRAVENOUS
Status: CANCELLED
Start: 2022-01-01

## 2021-12-21 RX ORDER — HEPARIN SODIUM (PORCINE) LOCK FLUSH IV SOLN 100 UNIT/ML 100 UNIT/ML
5 SOLUTION INTRAVENOUS
Status: CANCELLED | OUTPATIENT
Start: 2022-01-01

## 2021-12-21 RX ORDER — HEPARIN SODIUM (PORCINE) LOCK FLUSH IV SOLN 100 UNIT/ML 100 UNIT/ML
5 SOLUTION INTRAVENOUS
Status: DISCONTINUED | OUTPATIENT
Start: 2021-12-21 | End: 2021-12-21 | Stop reason: HOSPADM

## 2021-12-21 RX ORDER — ONDANSETRON 8 MG/1
8 TABLET, FILM COATED ORAL
Status: CANCELLED
Start: 2022-01-01

## 2021-12-21 RX ORDER — DIPHENHYDRAMINE HCL 25 MG
25 CAPSULE ORAL
Status: DISCONTINUED | OUTPATIENT
Start: 2021-12-21 | End: 2021-12-21 | Stop reason: HOSPADM

## 2021-12-21 RX ORDER — DIPHENHYDRAMINE HCL 25 MG
25 CAPSULE ORAL
Status: CANCELLED
Start: 2022-01-01

## 2021-12-21 RX ADMIN — MORPHINE SULFATE 2 MG: 2 INJECTION, SOLUTION INTRAMUSCULAR; INTRAVENOUS at 12:52

## 2021-12-21 RX ADMIN — MORPHINE SULFATE 2 MG: 2 INJECTION, SOLUTION INTRAMUSCULAR; INTRAVENOUS at 10:54

## 2021-12-21 RX ADMIN — MORPHINE SULFATE 2 MG: 2 INJECTION, SOLUTION INTRAMUSCULAR; INTRAVENOUS at 11:51

## 2021-12-21 RX ADMIN — DEXTROSE AND SODIUM CHLORIDE 500 ML: 5; 450 INJECTION, SOLUTION INTRAVENOUS at 10:54

## 2021-12-21 NOTE — PATIENT INSTRUCTIONS
Dear Jennifer Cervantes    Thank you for choosing [facility] ([facility]) for your infusion.  The following information is a summary of our appointment as well as important reminders.      We look forward in seeing you on your next appointment here at [facility] ([facility]).  Please don t hesitate to call us at [phone] to reschedule any of your appointments or to speak with one of the [facility] registered nurses.  It was a pleasure taking care of you today.    Sincerely,    [facility]  [facility]  [address] [place] [address]  [id number]  Phone:  [phone]

## 2021-12-21 NOTE — PROGRESS NOTES
45 minute telephone visit as patient not on computer.      Impression:  1. SCA  2. Recurrent chest crisis  3. ?pulmonary emboli  4. History of stroke  5. History of upper extremity venous thrombosis  6. History of iron overload   7. Warfarin anticoagulation  8. Chronic pain medication with narcotics  9. Depo-provera contraception    I am not sure how we would distinguish between in-situ vascular occlusion seen in the SCA lung versus true, classical thromboembolic event.  She does not have pulmonary arterial hypertension.      The role of oral anticoagulation in SCA is somewhat empiric and the best data as suggested to me is with heparinoids particularly those with p-selectin inhibition    You might consider switching her to a DOAC (greater efficacy, less risk of bleeding or being out of therapeutic bounds)    Should consider echo bubble study looking for evidence of a right to left shunt that might explain her stroke or other option would be to repeat both V and Q lung scan and also simultaneously image her head .  VQ lung scan can be ordered but in notation of order need to put in (needs to be VQ).    BMT given her age?    There is no interim history of increasing shortness of breath, orthopnea, PND, ankle edema, increasing abdominal girth, palpitation, pre-syncope, syncope, bleeding or but question of  thromboembolic complications.  The patient is taking medication regularly, following a low salt diet, and exercising regularly as outlined.    Constitutional: weight loss, fever, chills, night sweats  HEENT: without visual changes, swallow difficulties  Pulmonary: without shortness of breath, cough, wheeze, hemoptysis but recurrent admissions chest crisis  Cardiac: without chest pain, ORTEGA, PND, orthopnea, edema, palpitation, pre-syncope, syncope,  GI: without diarrhea, constipation, jaundice, melena, GERD, hematemesis  : without frequency, urgency, dysuria, hematuria  Skin: rash, bruise, open lesions  Neuro:  history of stroke  Ortho: without pain, swelling, mobility impairment  Endocrine: diabetes, thyroid, heat/cold intolerance, polyuria, polyphagia, change bowel habits.  Sleep:no AMAN, periodic breathing, fatigue                  Normal cardiac output.    Normal PA pressures.    Right sided filling pressures are normal.    Left sided filling pressures are normal.           Plan         Hemodynamics    RA:--/--/7  RV: 28/7  PA: 27/13/15  PCW:--/--/7    James CO/CI: 5.02/2.76  TD CO/CI: 6.57/3.61     Right sided filling pressures are normal. Left sided filling pressures are normal. Normal PA pressures. Normal cardiac output level.             Results for HIMANSHU AL (MRN [id number]) as of 12/21/2021 17:33   Ref. Range 12/20/2021 14:06   Sodium Latest Ref Range: 133 - 144 mmol/L 138   Potassium Latest Ref Range: 3.4 - 5.3 mmol/L 3.9   Chloride Latest Ref Range: 94 - 109 mmol/L 110 (H)   Carbon Dioxide Latest Ref Range: 20 - 32 mmol/L 19 (L)   Urea Nitrogen Latest Ref Range: 7 - 30 mg/dL 7   Creatinine Latest Ref Range: 0.52 - 1.04 mg/dL 0.55   GFR Estimate Latest Ref Range: >60 mL/min/1.73m2 >90   Calcium Latest Ref Range: 8.5 - 10.1 mg/dL 9.0   Anion Gap Latest Ref Range: 3 - 14 mmol/L 9   Albumin Latest Ref Range: 3.4 - 5.0 g/dL 4.2   Protein Total Latest Ref Range: 6.8 - 8.8 g/dL 8.1   Bilirubin Total Latest Ref Range: 0.2 - 1.3 mg/dL 3.4 (H)   Alkaline Phosphatase Latest Ref Range: 40 - 150 U/L 82   ALT Latest Ref Range: 0 - 50 U/L 61 (H)   AST Latest Ref Range: 0 - 45 U/L 61 (H)   Ferritin Latest Ref Range: 12 - 150 ng/mL 6,016 (H)   Glucose Latest Ref Range: 70 - 99 mg/dL 86   WBC Latest Ref Range: 4.0 - 11.0 10e3/uL 11.1 (H)   Hemoglobin Latest Ref Range: 11.7 - 15.7 g/dL 8.3 (L)   Hematocrit Latest Ref Range: 35.0 - 47.0 % 23.3 (L)   Platelet Count Latest Ref Range: 150 - 450 10e3/uL 325   RBC Count Latest Ref Range: 3.80 - 5.20 10e6/uL 2.70 (L)   MCV Latest Ref Range: 78 - 100 fL 86   MCH Latest Ref Range: 26.5  - 33.0 pg 30.7   MCHC Latest Ref Range: 31.5 - 36.5 g/dL 35.6   RDW Latest Ref Range: 10.0 - 15.0 % 23.6 (H)   % Neutrophils Latest Units: % 62   % Lymphocytes Latest Units: % 24   % Monocytes Latest Units: % 7   % Eosinophils Latest Units: % 5   % Basophils Latest Units: % 2   Absolute Basophils Latest Ref Range: 0.0 - 0.2 10e3/uL 0.2   Absolute Eosinophils Latest Ref Range: 0.0 - 0.7 10e3/uL 0.5   Absolute Immature Granulocytes Latest Ref Range: <=0.4 10e3/uL 0.1   Absolute Lymphocytes Latest Ref Range: 0.8 - 5.3 10e3/uL 2.6   Absolute Monocytes Latest Ref Range: 0.0 - 1.3 10e3/uL 0.8   % Immature Granulocytes Latest Units: % 0   Absolute Neutrophils Latest Ref Range: 1.6 - 8.3 10e3/uL 6.9   Absolute NRBCs Latest Units: 10e3/uL 0.5   NRBCs per 100 WBC Latest Ref Range: <1 /100 5 (H)   INR Latest Ref Range: 0.85 - 1.15  1.27 (H)         Examination: NM LUNG SCAN PERFUSION PARTICULATE       Date: 11/15/2021 3:47 PM      Indication: PE suspected, high prob; no      Comparison: Perfusion study 11/10/2021, 8/7/2021, 7/13/2021.     Additional Information: none     Technique:     The patient received 7 mCi of Tc-99m labeled MAA intravenously.  Ventilation imaging was deferred as per department policy precautions  during COVID-19 pandemic. A standard eight view lung perfusion scan  was obtained. SPECT images of the lungs were obtained. CT images of  the chest were obtained for the purposes of anatomic localization and  attenuation correction only. Fused SPECT-CT processing was performed  in the [employer] workstation, archived in PACS, and reviewed by the  radiologist.     Findings:     Planar images demonstrate new perfusion defect in the right lower  lateral segment. Similar anterior lateral left inferior perfusion  defect compared to prior.     Fused SPECT-CT images demonstrate no pleural effusion, pneumothorax,  for consolidation. Left chest wall Port-A-Cath with tip terminating in  the high right atrium. A substantial  change in upper abdominal  lymphadenopathy. Surgically absent gallbladder.                                                                      Impression:  New right lower lateral segment perfusion defect. Multiple bilateral  chronic perfusion defects.     [Urgent Result: Acute on chronic right pulmonary embolism.]     Finding was identified on 11/15/2021 4:20 PM.      Dr. Gomez  was contacted by Dr. Cooper at 11/15/2021 4:37 PM and  verbalized understanding of the urgent finding.      I have personally reviewed the examination and initial interpretation  and I agree with the findings.     BRANDEN GARCIA MD     Name: HIMANSHU AL  MRN: [id number]  : 2000  Study Date: 2021 01:38 PM  Age: 21 yrs  Gender: Female  Patient Location: [facility]  Reason For Study: Dyspnea, Assess RVFX  Ordering Physician: RYAN WISE  Performed By: THEE Lu     BSA: 1.8 m2  Height: 64 in  Weight: 170 lb  BP: 113/53 mmHg  ______________________________________________________________________________  Procedure  Limited Portable Echo Adult.  ______________________________________________________________________________  Interpretation Summary  Limited TTE.     Global and regional left ventricular function is normal with an EF of 55-60%.  The right ventricle is normal in size and function.  No significant valvular abnormality.  PA systolic pressure could not be assessed.  No pericardial effusion.     This study was compared with the study from 2021, no significant change.  ______________________________________________________________________________  Left Ventricle  Global and regional left ventricular function is normal with an EF of 55-60%.     Right Ventricle  The right ventricle is normal size. Global right ventricular function is  normal.     Atria  Both atria appear normal.     Mitral Valve  Mild mitral annular calcification is present.     Aortic Valve  The aortic valve cannot be assessed.      Tricuspid Valve  Trace tricuspid insufficiency is present. Pulmonary artery systolic pressure  cannot be assessed. The peak velocity of the tricuspid regurgitant jet is not  obtainable.     Pulmonic Valve  The pulmonic valve is normal.     Vessels  IVC diameter <2.1 cm collapsing >50% with sniff suggests a normal RA pressure  of 3 mmHg.     Pericardium  No pericardial effusion is present.     Compared to Previous Study  This study was compared with the study from 2021, no significant change. .     Attestation  I have personally viewed the imaging and agree with the interpretation and  report as documented by the fellow, Margaret Clemons, and/or edited by me.  ______________________________________________________________________________  Doppler Measurements & Calculations  PA V2 max: 118.0 cm/sec  PA max P.6 mmHg  PA acc time: 0.12 sec     ______________________________________________________________________________  Report approved by: Real DON 2021 02:16 PM                    Current Outpatient Medications   Medication     acetaminophen (TYLENOL) 325 MG tablet     albuterol (PROAIR HFA/PROVENTIL HFA/VENTOLIN HFA) 108 (90 Base) MCG/ACT inhaler     albuterol (PROVENTIL) (2.5 MG/3ML) 0.083% neb solution     ARIPiprazole (ABILIFY) 2 MG tablet     budesonide-formoterol (SYMBICORT) 160-4.5 MCG/ACT Inhaler     deferasirox (JADENU) 360 MG tablet     diphenhydrAMINE (BENADRYL) 25 MG capsule     EPINEPHrine (ANY BX GENERIC EQUIV) 0.3 MG/0.3ML injection 2-pack     Hydroxyurea 1000 MG TABS     hydrOXYzine (ATARAX) 25 MG tablet     [START ON 2021] morphine (MS CONTIN) 15 MG CR tablet     naloxone (NARCAN) 4 MG/0.1ML nasal spray     omeprazole (PRILOSEC) 20 MG DR capsule     ondansetron (ZOFRAN) 8 MG tablet     oxyCODONE IR (ROXICODONE) 15 MG tablet     sertraline (ZOLOFT) 100 MG tablet     warfarin ANTICOAGULANT (COUMADIN) 5 MG tablet     warfarin ANTICOAGULANT (COUMADIN) 7.5 MG tablet      lidocaine-prilocaine (EMLA) 2.5-2.5 % external cream     medroxyPROGESTERone (DEPO-PROVERA) 150 MG/ML IM injection     Current Facility-Administered Medications   Medication     medroxyPROGESTERone (DEPO-PROVERA) syringe 150 mg

## 2021-12-21 NOTE — TELEPHONE ENCOUNTER
"[facility] Telephone Triage Note    The following symptoms were reported:   Pt called in to triage requesting IVF pain meds for lower back and pelvic area pain rated 9/10 x 2 days.    Pt stated,\"Thought had IVF/Pain appt this morning, is currently in the building and is wondering what happened with appt for today as it appears today's appts was cancelled.     Pt wondering if can get IVF/pain med today?     Stated last took prn MS Contin aand oxycodone at 6 am this morning without relief. Denied any fevers, chills, cough, sob, chest pain, numbness or tingling or other symptoms not typical of sickle cell pain.   Pt's last infusion was 12/20/21, last clinic visit 12/20/21 with Dr Duncan.   Patient states would need transportation today.       If you do not hear from the infusion center by 2pm then you will not be able to get in for an infusion today.   Please note, if you are late for your appt, you risk losing your infusion appt as it may delay another patient's infusion who arrived on time.   The following provider was consulted:     Paged Dr Duncan [phone], approved for   IVF/Pain, no labs needed.     Pending appt availability    Patient instructions and/or follow up:    Appt available for 10:30am     [phone] Paged  Abdullahi CLAY For transportation    Scheduling notified.   "

## 2021-12-21 NOTE — TELEPHONE ENCOUNTER
Pt called in to triage requesting IVF pain meds for back and side pain rated 9/10 x2 days. Stated last took prn oxy 10 mg, tylenol and ibuprofen at 7 am and 3 am this morning without relief. Denied any fevers, chills, cough, sob, chest pain or other symptoms. Pt's last infusion was 2/10, last clinic visit 2/10 with follow-up on 2/24 with Andrei HIGGINS. Pt denied being out of home medications and needing any refills today. Pt qualifies for sickle cell standing order protocol.     PAST MEDICAL HISTORY:  Enlarged prostate     Gout     HLD (hyperlipidemia)     HTN (hypertension)     Hyperthyroidism

## 2021-12-21 NOTE — NURSING NOTE
Chief Complaint   Patient presents with     Consult     New PH - Referred by Dr. Curiel for CTEPH evaulation d/t recent CT.  No frandy Law, Virtual Facilitator/LPN

## 2021-12-21 NOTE — PROGRESS NOTES
Nursing Note  Jennifer Cervantes presents today to [facility] for:   Chief Complaint   Patient presents with     Infusion     IVF, pain meds     During today's [facility] appointment, orders from Dr. Duncan were completed.  Frequency: PRN    Progress note:  Patient identification verified by name and date of birth.  Assessment completed.  Vitals recorded in Doc Flowsheets.  Patient was provided with education regarding medication/procedure and possible side effects.  Patient verbalized understanding.     present during visit today: Not Applicable.    Treatment Conditions: Non-applicable.    Premedications: patient declined benadryl and zofran    Drug Waste Record: No    Infusion length and rate:  infusion given over approximately 2 hours    Labs: were not ordered for this appointment.    Vascular access: port accessed today.    Is the next appt scheduled? NA  Asymptomatic COVID test completed? NA    Post Infusion Assessment:  Patient tolerated infusion without incident. Pain down to 6/10 and tolerable upon discharge.     Discharge Plan:   Follow up plan of care with: ongoing infusions at [facility]., ordering provider as scheduled. and after visit summary declined by patient  Discharge instructions were reviewed with patient.  Patient/representative verbalized understanding of discharge instructions and all questions answered.  Patient discharged from [facility] in stable condition.    Malgorzata Holcomb RN       Administrations This Visit     dextrose 5% and 0.45% NaCl BOLUS     Admin Date  12/21/2021 Action  New Bag Dose  500 mL Rate  250 mL/hr Route  Intravenous Administered By  Malgorzata Holcomb RN          morphine (PF) injection 2 mg     Admin Date  12/21/2021 Action  Given Dose  2 mg Route  Intravenous Administered By  Malgorzata Holcomb RN                /74   Pulse 108   Temp 98.5   F (36.9  C) (Oral)   Resp 18   SpO2 92%

## 2021-12-21 NOTE — LETTER
12/21/2021      RE: Jennifer Cervantes  [address]  [facility] [address]       Dear Colleague,    Thank you for the opportunity to participate in the care of your patient, Jennifer Cervantes, at the [facility] HEART [facility]. Please see a copy of my visit note below.    Jennifer is a 21 year old who is being evaluated via a billable video visit.              45 minute telephone visit as patient not on computer.      Impression:  1. SCA  2. Recurrent chest crisis  3. ?pulmonary emboli  4. History of stroke  5. History of upper extremity venous thrombosis  6. History of iron overload   7. Warfarin anticoagulation  8. Chronic pain medication with narcotics  9. Depo-provera contraception    I am not sure how we would distinguish between in-situ vascular occlusion seen in the SCA lung versus true, classical thromboembolic event.  She does not have pulmonary arterial hypertension.      The role of oral anticoagulation in SCA is somewhat empiric and the best data as suggested to me is with heparinoids particularly those with p-selectin inhibition    You might consider switching her to a DOAC (greater efficacy, less risk of bleeding or being out of therapeutic bounds)    Should consider echo bubble study looking for evidence of a right to left shunt that might explain her stroke or other option would be to repeat both V and Q lung scan and also simultaneously image her head .  VQ lung scan can be ordered but in notation of order need to put in (needs to be VQ).    BMT given her age?    There is no interim history of increasing shortness of breath, orthopnea, PND, ankle edema, increasing abdominal girth, palpitation, pre-syncope, syncope, bleeding or but question of  thromboembolic complications.  The patient is taking medication regularly, following a low salt diet, and exercising regularly as outlined.    Constitutional: weight loss, fever, chills, night  sweats  HEENT: without visual changes, swallow difficulties  Pulmonary: without shortness of breath, cough, wheeze, hemoptysis but recurrent admissions chest crisis  Cardiac: without chest pain, ORTEGA, PND, orthopnea, edema, palpitation, pre-syncope, syncope,  GI: without diarrhea, constipation, jaundice, melena, GERD, hematemesis  : without frequency, urgency, dysuria, hematuria  Skin: rash, bruise, open lesions  Neuro: history of stroke  Ortho: without pain, swelling, mobility impairment  Endocrine: diabetes, thyroid, heat/cold intolerance, polyuria, polyphagia, change bowel habits.  Sleep:no AMAN, periodic breathing, fatigue                  Normal cardiac output.    Normal PA pressures.    Right sided filling pressures are normal.    Left sided filling pressures are normal.           Plan         Hemodynamics    RA:--/--/7  RV: 28/7  PA: 27/13/15  PCW:--/--/7    James CO/CI: 5.02/2.76  TD CO/CI: 6.57/3.61     Right sided filling pressures are normal. Left sided filling pressures are normal. Normal PA pressures. Normal cardiac output level.             Results for HIMANSHU AL (MRN [id number]) as of 12/21/2021 17:33   Ref. Range 12/20/2021 14:06   Sodium Latest Ref Range: 133 - 144 mmol/L 138   Potassium Latest Ref Range: 3.4 - 5.3 mmol/L 3.9   Chloride Latest Ref Range: 94 - 109 mmol/L 110 (H)   Carbon Dioxide Latest Ref Range: 20 - 32 mmol/L 19 (L)   Urea Nitrogen Latest Ref Range: 7 - 30 mg/dL 7   Creatinine Latest Ref Range: 0.52 - 1.04 mg/dL 0.55   GFR Estimate Latest Ref Range: >60 mL/min/1.73m2 >90   Calcium Latest Ref Range: 8.5 - 10.1 mg/dL 9.0   Anion Gap Latest Ref Range: 3 - 14 mmol/L 9   Albumin Latest Ref Range: 3.4 - 5.0 g/dL 4.2   Protein Total Latest Ref Range: 6.8 - 8.8 g/dL 8.1   Bilirubin Total Latest Ref Range: 0.2 - 1.3 mg/dL 3.4 (H)   Alkaline Phosphatase Latest Ref Range: 40 - 150 U/L 82   ALT Latest Ref Range: 0 - 50 U/L 61 (H)   AST Latest Ref Range: 0 - 45 U/L 61 (H)   Ferritin Latest  Ref Range: 12 - 150 ng/mL 6,016 (H)   Glucose Latest Ref Range: 70 - 99 mg/dL 86   WBC Latest Ref Range: 4.0 - 11.0 10e3/uL 11.1 (H)   Hemoglobin Latest Ref Range: 11.7 - 15.7 g/dL 8.3 (L)   Hematocrit Latest Ref Range: 35.0 - 47.0 % 23.3 (L)   Platelet Count Latest Ref Range: 150 - 450 10e3/uL 325   RBC Count Latest Ref Range: 3.80 - 5.20 10e6/uL 2.70 (L)   MCV Latest Ref Range: 78 - 100 fL 86   MCH Latest Ref Range: 26.5 - 33.0 pg 30.7   MCHC Latest Ref Range: 31.5 - 36.5 g/dL 35.6   RDW Latest Ref Range: 10.0 - 15.0 % 23.6 (H)   % Neutrophils Latest Units: % 62   % Lymphocytes Latest Units: % 24   % Monocytes Latest Units: % 7   % Eosinophils Latest Units: % 5   % Basophils Latest Units: % 2   Absolute Basophils Latest Ref Range: 0.0 - 0.2 10e3/uL 0.2   Absolute Eosinophils Latest Ref Range: 0.0 - 0.7 10e3/uL 0.5   Absolute Immature Granulocytes Latest Ref Range: <=0.4 10e3/uL 0.1   Absolute Lymphocytes Latest Ref Range: 0.8 - 5.3 10e3/uL 2.6   Absolute Monocytes Latest Ref Range: 0.0 - 1.3 10e3/uL 0.8   % Immature Granulocytes Latest Units: % 0   Absolute Neutrophils Latest Ref Range: 1.6 - 8.3 10e3/uL 6.9   Absolute NRBCs Latest Units: 10e3/uL 0.5   NRBCs per 100 WBC Latest Ref Range: <1 /100 5 (H)   INR Latest Ref Range: 0.85 - 1.15  1.27 (H)         Examination: NM LUNG SCAN PERFUSION PARTICULATE       Date: 11/15/2021 3:47 PM      Indication: PE suspected, high prob; no      Comparison: Perfusion study 11/10/2021, 8/7/2021, 7/13/2021.     Additional Information: none     Technique:     The patient received 7 mCi of Tc-99m labeled MAA intravenously.  Ventilation imaging was deferred as per department policy precautions  during COVID-19 pandemic. A standard eight view lung perfusion scan  was obtained. SPECT images of the lungs were obtained. CT images of  the chest were obtained for the purposes of anatomic localization and  attenuation correction only. Fused SPECT-CT processing was performed  in the [employer]  Via workstation, archived in PACS, and reviewed by the  radiologist.     Findings:     Planar images demonstrate new perfusion defect in the right lower  lateral segment. Similar anterior lateral left inferior perfusion  defect compared to prior.     Fused SPECT-CT images demonstrate no pleural effusion, pneumothorax,  for consolidation. Left chest wall Port-A-Cath with tip terminating in  the high right atrium. A substantial change in upper abdominal  lymphadenopathy. Surgically absent gallbladder.                                                                      Impression:  New right lower lateral segment perfusion defect. Multiple bilateral  chronic perfusion defects.     [Urgent Result: Acute on chronic right pulmonary embolism.]     Finding was identified on 11/15/2021 4:20 PM.      Dr. Gomez  was contacted by Dr. Cooper at 11/15/2021 4:37 PM and  verbalized understanding of the urgent finding.      I have personally reviewed the examination and initial interpretation  and I agree with the findings.     BRANDEN GARCIA MD     Name: HIMANSHU AL  MRN: [id number]  : 2000  Study Date: 2021 01:38 PM  Age: 21 yrs  Gender: Female  Patient Location: [facility] [place]  Reason For Study: Dyspnea, Assess RVFX  Ordering Physician: RYAN WISE  Performed By: THEE Lu     BSA: 1.8 m2  Height: 64 in  Weight: 170 lb  BP: 113/53 mmHg  ______________________________________________________________________________  Procedure  Limited Portable Echo Adult.  ______________________________________________________________________________  Interpretation Summary  Limited TTE.     Global and regional left ventricular function is normal with an EF of 55-60%.  The right ventricle is normal in size and function.  No significant valvular abnormality.  PA systolic pressure could not be assessed.  No pericardial effusion.     This study was compared with the study from 2021, no significant  change.  ______________________________________________________________________________  Left Ventricle  Global and regional left ventricular function is normal with an EF of 55-60%.     Right Ventricle  The right ventricle is normal size. Global right ventricular function is  normal.     Atria  Both atria appear normal.     Mitral Valve  Mild mitral annular calcification is present.     Aortic Valve  The aortic valve cannot be assessed.     Tricuspid Valve  Trace tricuspid insufficiency is present. Pulmonary artery systolic pressure  cannot be assessed. The peak velocity of the tricuspid regurgitant jet is not  obtainable.     Pulmonic Valve  The pulmonic valve is normal.     Vessels  IVC diameter <2.1 cm collapsing >50% with sniff suggests a normal RA pressure  of 3 mmHg.     Pericardium  No pericardial effusion is present.     Compared to Previous Study  This study was compared with the study from 2021, no significant change. .     Attestation  I have personally viewed the imaging and agree with the interpretation and  report as documented by the fellow, Margaret Clemons, and/or edited by me.  ______________________________________________________________________________  Doppler Measurements & Calculations  PA V2 max: 118.0 cm/sec  PA max P.6 mmHg  PA acc time: 0.12 sec     ______________________________________________________________________________  Report approved by: Real DON 2021 02:16 PM                    Current Outpatient Medications   Medication     acetaminophen (TYLENOL) 325 MG tablet     albuterol (PROAIR HFA/PROVENTIL HFA/VENTOLIN HFA) 108 (90 Base) MCG/ACT inhaler     albuterol (PROVENTIL) (2.5 MG/3ML) 0.083% neb solution     ARIPiprazole (ABILIFY) 2 MG tablet     budesonide-formoterol (SYMBICORT) 160-4.5 MCG/ACT Inhaler     deferasirox (JADENU) 360 MG tablet     diphenhydrAMINE (BENADRYL) 25 MG capsule     EPINEPHrine (ANY BX GENERIC EQUIV) 0.3 MG/0.3ML injection 2-pack      Hydroxyurea 1000 MG TABS     hydrOXYzine (ATARAX) 25 MG tablet     [START ON 12/28/2021] morphine (MS CONTIN) 15 MG CR tablet     naloxone (NARCAN) 4 MG/0.1ML nasal spray     omeprazole (PRILOSEC) 20 MG DR capsule     ondansetron (ZOFRAN) 8 MG tablet     oxyCODONE IR (ROXICODONE) 15 MG tablet     sertraline (ZOLOFT) 100 MG tablet     warfarin ANTICOAGULANT (COUMADIN) 5 MG tablet     warfarin ANTICOAGULANT (COUMADIN) 7.5 MG tablet     lidocaine-prilocaine (EMLA) 2.5-2.5 % external cream     medroxyPROGESTERone (DEPO-PROVERA) 150 MG/ML IM injection     Current Facility-Administered Medications   Medication     medroxyPROGESTERone (DEPO-PROVERA) syringe 150 mg       Please do not hesitate to contact me if you have any questions/concerns.     Sincerely,     Gaetano Mccormick MD

## 2021-12-21 NOTE — LETTER
12/21/2021         RE: Jennifer Cervantes  [address]  [facility] [address]        Dear Colleague,    Thank you for referring your patient, Jennifer Cervantes, to the [facility]. Please see a copy of my visit note below.    Nursing Note  Jennifer Cervantes [facility] for:   Chief Complaint   Patient presents with     Infusion     IVF, pain meds     During today's [facility] appointment, orders from Dr. Duncan were completed.  Frequency: PRN    Progress note:  Patient identification verified by name and date of birth.  Assessment completed.  Vitals recorded in Doc Flowsheets.  Patient was provided with education regarding medication/procedure and possible side effects.  Patient verbalized understanding.     present during visit today: Not Applicable.    Treatment Conditions: Non-applicable.    Premedications: patient declined benadryl and zofran    Drug Waste Record: No    Infusion length and rate:  infusion given over approximately 2 hours    Labs: were not ordered for this appointment.    Vascular access: port accessed today.    Is the next appt scheduled? NA  Asymptomatic COVID test completed? NA    Post Infusion Assessment:  Patient tolerated infusion without incident. Pain down to 6/10 and tolerable upon discharge.     Discharge Plan:   Follow up plan of care with: ongoing infusions at [facility] ordering provider as scheduled. and after visit summary declined by patient  Discharge instructions were reviewed with patient.  Patient/representative verbalized understanding of discharge instructions and all questions answered.  Patient discharged from [facility] in stable condition.    Malgorzata Glass RN       Administrations This Visit     dextrose 5% and 0.45% NaCl BOLUS     Admin Date  12/21/2021 Action  New Bag Dose  500 mL  Rate  250 mL/hr Route  Intravenous Administered By  Malgorzata Holcomb, RN          morphine (PF) injection 2 mg     Admin Date  12/21/2021 Action  Given Dose  2 mg Route  Intravenous Administered By  Malgorzata Holcomb, JOE                /74   Pulse 108   Temp 98.5  F (36.9  C) (Oral)   Resp 18   SpO2 92%         Again, thank you for allowing me to participate in the care of your patient.        Sincerely,        [facility]

## 2021-12-21 NOTE — PROGRESS NOTES
Social Work Note: Telephone Call  Oncology Clinic     Data/Intervention:  Patient Name:  Jennifer Cervantes  /Age: 2000, 21 years old     Call From: [facility]        Reason for Call:  Transportation     Assessment:   called [employer] ([phone]) to arrange ride. [employer] ([phone]) arranged  for patient from home with a will call return ride.  Patient will need to call when ready for return ride home.      Plan:  Patient is aware of the transportation plan.  available to assist with any other needs.      CARLOS Chavez,[facility]  Hematology/Oncology Social Worker  Phone:[phone] Pager: [phone]

## 2021-12-22 ENCOUNTER — NURSE TRIAGE (OUTPATIENT)
Dept: ONCOLOGY | Facility: CLINIC | Age: 21
End: 2021-12-22
Payer: COMMERCIAL

## 2021-12-22 ENCOUNTER — INFUSION THERAPY VISIT (OUTPATIENT)
Dept: ONCOLOGY | Facility: CLINIC | Age: 21
End: 2021-12-22
Attending: REGISTERED NURSE
Payer: COMMERCIAL

## 2021-12-22 VITALS
OXYGEN SATURATION: 92 % | HEART RATE: 114 BPM | DIASTOLIC BLOOD PRESSURE: 85 MMHG | RESPIRATION RATE: 18 BRPM | SYSTOLIC BLOOD PRESSURE: 143 MMHG | TEMPERATURE: 98.2 F

## 2021-12-22 DIAGNOSIS — G81.10 SPASTIC HEMIPLEGIA, UNSPECIFIED ETIOLOGY, UNSPECIFIED LATERALITY (H): ICD-10-CM

## 2021-12-22 DIAGNOSIS — D57.1 HB-SS DISEASE WITHOUT CRISIS (H): Primary | ICD-10-CM

## 2021-12-22 DIAGNOSIS — D57.00 SICKLE CELL PAIN CRISIS (H): ICD-10-CM

## 2021-12-22 PROCEDURE — 96361 HYDRATE IV INFUSION ADD-ON: CPT

## 2021-12-22 PROCEDURE — 96374 THER/PROPH/DIAG INJ IV PUSH: CPT

## 2021-12-22 PROCEDURE — 258N000003 HC RX IP 258 OP 636: Performed by: PEDIATRICS

## 2021-12-22 PROCEDURE — 96376 TX/PRO/DX INJ SAME DRUG ADON: CPT

## 2021-12-22 PROCEDURE — 250N000011 HC RX IP 250 OP 636: Performed by: PEDIATRICS

## 2021-12-22 RX ORDER — MORPHINE SULFATE 2 MG/ML
2 INJECTION, SOLUTION INTRAMUSCULAR; INTRAVENOUS
Status: DISCONTINUED | OUTPATIENT
Start: 2021-12-22 | End: 2021-12-22 | Stop reason: HOSPADM

## 2021-12-22 RX ORDER — HEPARIN SODIUM (PORCINE) LOCK FLUSH IV SOLN 100 UNIT/ML 100 UNIT/ML
5 SOLUTION INTRAVENOUS
Status: DISCONTINUED | OUTPATIENT
Start: 2021-12-22 | End: 2021-12-22 | Stop reason: HOSPADM

## 2021-12-22 RX ORDER — MORPHINE SULFATE 2 MG/ML
2 INJECTION, SOLUTION INTRAMUSCULAR; INTRAVENOUS
Status: CANCELLED
Start: 2022-01-01

## 2021-12-22 RX ORDER — DIPHENHYDRAMINE HCL 25 MG
25 CAPSULE ORAL
Status: CANCELLED
Start: 2022-01-01

## 2021-12-22 RX ORDER — HEPARIN SODIUM,PORCINE 10 UNIT/ML
5 VIAL (ML) INTRAVENOUS
Status: CANCELLED | OUTPATIENT
Start: 2022-01-01

## 2021-12-22 RX ORDER — HEPARIN SODIUM (PORCINE) LOCK FLUSH IV SOLN 100 UNIT/ML 100 UNIT/ML
5 SOLUTION INTRAVENOUS
Status: CANCELLED | OUTPATIENT
Start: 2022-01-01

## 2021-12-22 RX ORDER — ONDANSETRON 8 MG/1
8 TABLET, FILM COATED ORAL
Status: CANCELLED
Start: 2022-01-01

## 2021-12-22 RX ADMIN — MORPHINE SULFATE 2 MG: 2 INJECTION, SOLUTION INTRAMUSCULAR; INTRAVENOUS at 13:49

## 2021-12-22 RX ADMIN — Medication 5 ML: at 15:45

## 2021-12-22 RX ADMIN — DEXTROSE AND SODIUM CHLORIDE 500 ML: 5; 450 INJECTION, SOLUTION INTRAVENOUS at 12:46

## 2021-12-22 RX ADMIN — MORPHINE SULFATE 2 MG: 2 INJECTION, SOLUTION INTRAMUSCULAR; INTRAVENOUS at 12:49

## 2021-12-22 RX ADMIN — MORPHINE SULFATE 2 MG: 2 INJECTION, SOLUTION INTRAMUSCULAR; INTRAVENOUS at 14:51

## 2021-12-22 ASSESSMENT — PAIN SCALES - GENERAL: PAINLEVEL: EXTREME PAIN (9)

## 2021-12-22 NOTE — TELEPHONE ENCOUNTER
[facility] Triage    Incoming Call: IVF and pain meds Sickle Cell    Pt called in to triage requesting IVF pain meds for lower back pain rated 9/10 x she was unsure how many days. Stated last took MS contin and oxy this morning without much relief. Denied any fevers, chills, cough, sob, chest pain or other symptoms.  Assess for confusion, numbness, paralysis, vomiting, headache, vision issues, difficulty walking and/or talking. Pt's last infusion was yesterday, last clinic visit 12/20 Perla with no follow up on the calendar. Patient states they do not have own ride and it will take 60 long to get to cancer clinic. Pt denied being out of home medications and needing any refills today.  RNCC Amanda Roth took over call as Jennifer also has a SERA appt today.      Amanda Roth switched Jennifer from SERA to IVF and pain meds for today.    Routing to Dr. Duncan and Amanda Roth

## 2021-12-22 NOTE — TELEPHONE ENCOUNTER
Informed by infusion specialist team that pt's PA for Crizanlizumab had  and needing additional information to submit. Will cancel infusion today. Pt was informed and would still like to come in for IVF pain meds instead, approved by charge in infusion. Pt stated she already had her ride set up to drop off at 1230 today, confirmed with her that was fine as she will not need labs for IVF pain meds.

## 2021-12-22 NOTE — PATIENT INSTRUCTIONS
[facility] Triage and after hours / weekends / holidays:  [phone]    Please call the triage or after hours line if you experience a temperature greater than or equal to 100.5, shaking chills, have uncontrolled nausea, vomiting and/or diarrhea, dizziness, shortness of breath, chest pain, bleeding, unexplained bruising, or if you have any other new/concerning symptoms, questions or concerns.      If you are having any concerning symptoms or wish to speak to a provider before your next infusion visit, please call your care coordinator or triage to notify them so we can adequately serve you.     If you need a refill on a narcotic prescription or other medication, please call before your infusion appointment.       December 2021 Sunday Monday Tuesday Wednesday Thursday Friday Saturday                  1    BMT INFUSION 2 HR (120 MIN)  12:30 PM   (120 min.)    BMT INFUSION NURSE   [facility] [place]    XR CHEST 1 VIEW   4:00 PM   (20 min.)   [id number]   [facility] [place] 2    LAB INR   9:15 AM   (15 min.)   UC LAB   [facility] [place] 3    Admission   5:50 AM   Deo Schmid MD   [facility]   (Discharge: 12/3/2021) 4       5    Admission   5:43 AM   Andrew Chou MD   [facility]   (Discharge: 12/5/2021)    US LWR EXT VENOUS DUPLEX LEFT   6:35 AM   (30 min.)   [email or web address]   [facility] 6    VIDEO VISIT RETURN   9:15 AM   (30 min.)   Suraj Case MD   [facility] [place] 7    BMT INFUSION 2 HR (120 MIN)   1:30 PM   (120 min.)    BMT INFUSION NURSE   [facility] [place] 8     9    SPEC INFUSION 2 HR (120 MIN)   2:00 PM   (120 min.)    SIPC INFUSION NURSE   [facility] [place] 10    Admission   4:15  PM   Sonja Butcher MD   [facility]   (Discharge: 12/10/2021) 11       12    Admission  12:45 PM   Ifeanyi Gaitan MD   [facility]   (Discharge: 12/12/2021) 13    ONC INFUSION 2 HR (120 MIN)  10:00 AM   (120 min.)   UC ONC INFUSION NURSE   [facility] 14    BMT INFUSION 2 HR (120 MIN)   9:00 AM   (120 min.)   UC BMT INFUSION NURSE   [facility] [place] 15    SPEC INFUSION 2 HR (120 MIN)   1:00 PM   (120 min.)   UC SIPC INFUSION NURSE   [facility]    UM RETURN   2:40 PM   (40 min.)   Eric Alvarado MD   [facility] 16    SPEC INFUSION 2 HR (120 MIN)   1:00 PM   (120 min.)   UC SIPC INFUSION NURSE   [facility] 17    ONC INFUSION 2 HR (120 MIN)   9:00 AM   (120 min.)    ONC INFUSION NURSE   [facility] 18    Admission  10:36 PM   Jitendra Brandon DO   [facility]   (Discharge: 12/19/2021)   19     20    RETURN  12:15 PM   (30 min.)   Eric Duncan MD   [facility]    LAB CENTRAL   1:45 PM   (15 min.)    [facility] DRAW   [facility]    SPEC INFUSION 3 HR (180 MIN)   3:00 PM   (180 min.)   UC SIPC INFUSION NURSE   [facility] 21    SPEC INFUSION 2 HR (120 MIN)  10:30 AM   (120 min.)   UC SIPC INFUSION NURSE   [facility]    [facility]   3:15 PM   (60 min.)   Gaetano Mccormick MD   [facility] 22    ONC INFUSION 3 HR (180 MIN)  12:30 PM   (180 min.)    ONC INFUSION NURSE   [facility] 23     24     25       26     27      28     29     30     31                      January 2022 Sunday Monday Tuesday Wednesday Thursday Friday Saturday                                 1       2     3     4     5     6     7     8       9     10     11     12    RETURN  10:45 AM   (60 min.)   Katherine Pierce MD   [facility] 13     14     15       16     17     18     19     20     21     22       23     24     25     26     27     28     29       30     31

## 2021-12-22 NOTE — PROGRESS NOTES
Infusion Nursing Note:  Jennifer Cervantes presents today for IV fluids and pain medications. Patient unable to receive crizanlizumab today due to insurance requirements per Amanda Roth RN.     Patient seen by provider today: No   present during visit today: Not Applicable.    Note:   Patient reports having generalized pain, rating at 9/10. She denies fevers, chest pain, SOB, diarrhea, and nausea.    After 3 doses of IV morphine, patient reports is at 5/10. Patient feels comfortable going home.    Intravenous Access:  Implanted Port.    Treatment Conditions:  Not Applicable.      Post Infusion Assessment:  Patient tolerated infusion without incident.  Blood return noted pre and post infusion.  Site patent and intact, free from redness, edema or discomfort.  No evidence of extravasations.  Access discontinued per protocol.       Discharge Plan:   Patient declined prescription refills.  Copy of AVS reviewed with patient and/or family.  Patient will follow up with Dr. Duncan for next appointment, and rescheduling of crizanlizumab.  Patient discharged in stable condition accompanied by: self.  Departure Mode: Ambulatory.      Shelli Ventura RN

## 2021-12-23 ENCOUNTER — TELEPHONE (OUTPATIENT)
Dept: ONCOLOGY | Facility: CLINIC | Age: 21
End: 2021-12-23
Payer: COMMERCIAL

## 2021-12-23 ENCOUNTER — TELEPHONE (OUTPATIENT)
Dept: ANTICOAGULATION | Facility: CLINIC | Age: 21
End: 2021-12-23

## 2021-12-23 ENCOUNTER — HOME INFUSION (PRE-WILLOW HOME INFUSION) (OUTPATIENT)
Dept: PHARMACY | Facility: CLINIC | Age: 21
End: 2021-12-23
Payer: COMMERCIAL

## 2021-12-23 ENCOUNTER — PATIENT OUTREACH (OUTPATIENT)
Dept: CARE COORDINATION | Facility: CLINIC | Age: 21
End: 2021-12-23
Payer: COMMERCIAL

## 2021-12-23 ENCOUNTER — INFUSION THERAPY VISIT (OUTPATIENT)
Dept: INFUSION THERAPY | Facility: CLINIC | Age: 21
End: 2021-12-23
Attending: PEDIATRICS
Payer: COMMERCIAL

## 2021-12-23 VITALS
TEMPERATURE: 98.1 F | SYSTOLIC BLOOD PRESSURE: 131 MMHG | RESPIRATION RATE: 16 BRPM | HEART RATE: 98 BPM | DIASTOLIC BLOOD PRESSURE: 85 MMHG | OXYGEN SATURATION: 97 %

## 2021-12-23 DIAGNOSIS — D57.00 SICKLE CELL PAIN CRISIS (H): ICD-10-CM

## 2021-12-23 DIAGNOSIS — G81.10 SPASTIC HEMIPLEGIA, UNSPECIFIED ETIOLOGY, UNSPECIFIED LATERALITY (H): Primary | ICD-10-CM

## 2021-12-23 DIAGNOSIS — I27.82 CHRONIC PULMONARY EMBOLISM WITHOUT ACUTE COR PULMONALE, UNSPECIFIED PULMONARY EMBOLISM TYPE (H): Primary | ICD-10-CM

## 2021-12-23 PROCEDURE — 96376 TX/PRO/DX INJ SAME DRUG ADON: CPT

## 2021-12-23 PROCEDURE — 258N000003 HC RX IP 258 OP 636: Performed by: PEDIATRICS

## 2021-12-23 PROCEDURE — 96361 HYDRATE IV INFUSION ADD-ON: CPT

## 2021-12-23 PROCEDURE — 96374 THER/PROPH/DIAG INJ IV PUSH: CPT

## 2021-12-23 PROCEDURE — 250N000011 HC RX IP 250 OP 636: Performed by: PEDIATRICS

## 2021-12-23 RX ORDER — HEPARIN SODIUM,PORCINE 10 UNIT/ML
5 VIAL (ML) INTRAVENOUS
Status: DISCONTINUED | OUTPATIENT
Start: 2021-12-23 | End: 2021-12-23 | Stop reason: HOSPADM

## 2021-12-23 RX ORDER — HEPARIN SODIUM (PORCINE) LOCK FLUSH IV SOLN 100 UNIT/ML 100 UNIT/ML
5 SOLUTION INTRAVENOUS
Status: DISCONTINUED | OUTPATIENT
Start: 2021-12-23 | End: 2021-12-23 | Stop reason: HOSPADM

## 2021-12-23 RX ORDER — MORPHINE SULFATE 2 MG/ML
2 INJECTION, SOLUTION INTRAMUSCULAR; INTRAVENOUS
Status: COMPLETED | OUTPATIENT
Start: 2021-12-23 | End: 2021-12-23

## 2021-12-23 RX ORDER — DIPHENHYDRAMINE HCL 25 MG
25 CAPSULE ORAL
Status: CANCELLED
Start: 2022-01-01

## 2021-12-23 RX ORDER — ONDANSETRON 8 MG/1
8 TABLET, FILM COATED ORAL
Status: CANCELLED
Start: 2022-01-01

## 2021-12-23 RX ORDER — HEPARIN SODIUM,PORCINE 10 UNIT/ML
5 VIAL (ML) INTRAVENOUS
Status: CANCELLED | OUTPATIENT
Start: 2022-01-01

## 2021-12-23 RX ORDER — MORPHINE SULFATE 2 MG/ML
2 INJECTION, SOLUTION INTRAMUSCULAR; INTRAVENOUS
Status: CANCELLED
Start: 2022-01-01

## 2021-12-23 RX ORDER — HEPARIN SODIUM (PORCINE) LOCK FLUSH IV SOLN 100 UNIT/ML 100 UNIT/ML
5 SOLUTION INTRAVENOUS
Status: CANCELLED | OUTPATIENT
Start: 2022-01-01

## 2021-12-23 RX ADMIN — DEXTROSE AND SODIUM CHLORIDE 500 ML: 5; 450 INJECTION, SOLUTION INTRAVENOUS at 10:29

## 2021-12-23 RX ADMIN — MORPHINE SULFATE 2 MG: 2 INJECTION, SOLUTION INTRAMUSCULAR; INTRAVENOUS at 12:32

## 2021-12-23 RX ADMIN — MORPHINE SULFATE 2 MG: 2 INJECTION, SOLUTION INTRAMUSCULAR; INTRAVENOUS at 11:31

## 2021-12-23 RX ADMIN — Medication 5 ML: at 12:43

## 2021-12-23 RX ADMIN — MORPHINE SULFATE 2 MG: 2 INJECTION, SOLUTION INTRAMUSCULAR; INTRAVENOUS at 10:29

## 2021-12-23 ASSESSMENT — PAIN SCALES - GENERAL: PAINLEVEL: EXTREME PAIN (9)

## 2021-12-23 NOTE — TELEPHONE ENCOUNTER
Pt called in to triage requesting IVF pain meds for low back pain, 8/10 x many days. Stated last took prn MS Contin at 6 a.m. this morning without relief. Denied any fevers, chills, cough, sob, chest pain, numbness or tingling or other symptoms not typical of sickle cell pain.   Pt's last infusion was 12/22, last clinic visit 12/20 with follow-up not arranged currently with hematology, has appointments with Dr. Pierce and Neurology.  Patient states they do not have own ride and it will take 20-25 minutes to get to cancer clinic.   Pt denied being out of home medications and needing any refills today.     If you do not hear from the infusion center by 2pm then you will not be able to get in for an infusion today.     Added to wait list.  Paged Dr. Duncan and spoke to RNCC who approved infusion today as long as there are no new sx.     Call made to Jennifer who confirms she can come to apt.  Notified [employer] who is working on transportation.  Updated infusion that pt is able to come to clinic at 10 am    Routed to Dr. Duncan and Care Team.

## 2021-12-23 NOTE — PROGRESS NOTES
"Infusion Nursing Note:  Jennifer Cervantes presents today for   Chief Complaint   Patient presents with     Infusion     IV hydration and pain management     Patient seen by provider today: No   present during visit today: Not Applicable.      Intravenous Access:  Implanted Port in left upper chest accessed with 20G 3/4\" larios, flushed with saline, brisk blood return.    Treatment Conditions:  Not Applicable.    Administrations This Visit     dextrose 5% and 0.45% NaCl BOLUS     Admin Date  12/23/2021 Action  New Bag Dose  500 mL Rate  250 mL/hr Route  Intravenous Administered By  Dolores Washington RN          heparin 100 UNIT/ML injection 5 mL     Admin Date  12/23/2021 Action  Given Dose  5 mL Route  Intracatheter Administered By  Dolores Washington RN          morphine (PF) injection 2 mg     Admin Date  12/23/2021 Action  Given Dose  2 mg Route  Intravenous Administered By  Dolores Washington RN           Admin Date  12/23/2021 Action  Given Dose  2 mg Route  Intravenous Administered By  Dolores Washington RN           Admin Date  12/23/2021 Action  Given Dose  2 mg Route  Intravenous Administered By  Doolres Washington RN                    Post Infusion Assessment:  Patient tolerated infusion without incident.  Blood return noted pre and post infusion.  Site patent and intact, free from redness, edema or discomfort.  No evidence of extravasations.  Access discontinued per protocol.       Discharge Plan:   Copy of AVS reviewed with patient and/or family.  Patient will return follow up with provider.  Patient discharged in stable condition accompanied by: self.  Departure Mode: Ambulatory. Has ride from [employer].      Dolores Washington RN                    "

## 2021-12-23 NOTE — PROGRESS NOTES
Social Work Note: Telephone Call  Oncology Clinic     Data/Intervention:  Patient Name:  Jennifer Cervantes  /Age: 2000, 21 years old     Call From: [facility]        Reason for Call:  Transportation     Assessment:   called [employer] to arrange ride through patient's insurance. [employer] Ride arranged  for patient from home with [employer] ([phone]).  Patient will need to call when ready for return ride home.      Plan:  Patient is aware of the transportation plan.  available to assist with any other needs.      CARLOS Chavez,[employer]  Hematology/Oncology Social Worker  Phone:[phone] Pager: [phone]

## 2021-12-23 NOTE — LETTER
"    12/23/2021         RE: Jennifer Cervantes  [address]  [facility] [address]        Dear Colleague,    Thank you for referring your patient, Jennifer Cervantes, to the [facility]. Please see a copy of my visit note below.    Infusion Nursing Note:  Jennifer Cervantes presents today for   Chief Complaint   Patient presents with     Infusion     IV hydration and pain management     Patient seen by provider today: No   present during visit today: Not Applicable.      Intravenous Access:  Implanted Port in left upper chest accessed with 20G 3/4\" larios, flushed with saline, brisk blood return.    Treatment Conditions:  Not Applicable.    Administrations This Visit     dextrose 5% and 0.45% NaCl BOLUS     Admin Date  12/23/2021 Action  New Bag Dose  500 mL Rate  250 mL/hr Route  Intravenous Administered By  Dolores Washington RN          heparin 100 UNIT/ML injection 5 mL     Admin Date  12/23/2021 Action  Given Dose  5 mL Route  Intracatheter Administered By  Dolores Washington RN          morphine (PF) injection 2 mg     Admin Date  12/23/2021 Action  Given Dose  2 mg Route  Intravenous Administered By  Dolores Wahsington RN           Admin Date  12/23/2021 Action  Given Dose  2 mg Route  Intravenous Administered By  Dolores Washington RN           Admin Date  12/23/2021 Action  Given Dose  2 mg Route  Intravenous Administered By  Dolores Washington RN                    Post Infusion Assessment:  Patient tolerated infusion without incident.  Blood return noted pre and post infusion.  Site patent and intact, free from redness, edema or discomfort.  No evidence of extravasations.  Access discontinued per protocol.       Discharge Plan:   Copy of AVS reviewed with patient and/or family.  Patient will return follow up with provider.  Patient discharged in stable condition accompanied by: self.  Departure Mode: Ambulatory. Has ride from [place].      Dolores Washington" RN                        Again, thank you for allowing me to participate in the care of your patient.        Sincerely,        [facility]

## 2021-12-23 NOTE — PATIENT INSTRUCTIONS
Dear Jennifer Cervantes    Thank you for choosing [facility] ([facility]) for your infusion.  The following information is a summary of our appointment as well as important reminders.      We look forward in seeing you on your next appointment here at [facility] ([facility]).  Please don t hesitate to call us at [phone] to reschedule any of your appointments or to speak with one of the [facility] registered nurses.  It was a pleasure taking care of you today.    Sincerely,      Dolores GOMES, RN  [facility]  [address] [place] [address]  [address]  Phone:  [phone]    Patient Education     Morphine Injection 2 mg/mL  Uses  This medicine is used for the following purposes:    pain    fluid in lungs    drug dependence  Instructions  Carefully follow the instructions for preparing this medicine before injection.  Read and make sure you understand the instructions for measuring your dose and using the syringe before using this medicine.  Always inspect the medicine before using.  The liquid should be clear or light yellow.  Do not use the medicine if it contains any particles or if it has changed color.  Protect medicine from light.  Speak with your nurse or pharmacist about how long the medicine can be stored safely at room temperature or in the refrigerator before it needs to be discarded.  Never use any medicine that has .  Please ask your doctor, nurse, or pharmacist how to discard unused medicines safely.  Ask your doctor, nurse or pharmacist to show you how to use this medicine correctly.  If you forget to take a dose on time, take it as soon as you remember. If it is almost time for the next dose, do not take the missed dose. Return to your normal dosing schedule. Do not take 2 doses of this medicine at one time.  Please tell your doctor and pharmacist about  all the medicines you take. Include both prescription and over-the-counter medicines. Also tell them about any vitamins, herbal medicines, or anything else you take for your health.  If your symptoms do not improve or they worsen while on this medicine, contact your doctor.  To reduce constipation, eat high fiber foods, drink plenty of water and exercise.  Do not suddenly stop taking this medicine. Check with your doctor before stopping.  If you need to stop this medicine, your doctor may wish to gradually reduce the dosage before stopping.  It is very important that you follow your doctor's instructions for all blood tests.  Cautions  This medicine contains an opioid. Though it helps many people, this medicine may sometimes cause addiction, especially if it is used for a long time. This risk for addiction may be higher if you have a substance use disorder - such as overuse of or addiction to drugs or alcohol. Speak with your doctor about the benefits and risks of using this medicine.  Ask your doctor or pharmacist if you should have naloxone on hand to treat opioid overdose. Teach your family or household members about the signs of an opioid overdose and how to treat it.  This medicine can be habit-forming. If you use this medicine regularly for a long time, it can lead to withdrawal symptoms when you stop. Please use this medicine only as directed.  Tell your doctor and pharmacist if you ever had an allergic reaction to a medicine. Symptoms of an allergic reaction can include trouble breathing, skin rash, itching, swelling, or severe dizziness.  Some patients with weak hearts may have worsening of symptoms. If you notice difficulty breathing, weight gain, or swelling of your legs or ankles, let your doctor know right away.  Some patients taking this medicine have experienced serious side effects. Please speak with your doctor to understand the risks and benefits associated with this medicine.  Do not use the  medication any more than instructed.  This medicine may cause dizziness or fainting, especially after exercising or in hot weather. Be very careful when standing or sitting up quickly.  Your ability to stay alert or to react quickly may be impaired by this medicine. Do not operate machinery or drive while on this medicine.  Do not drink beverages with alcohol while on this medicine.  If possible, avoid using with marijuana or other medicines that can cause dizziness or drowsiness. These include allergy/cold products, muscle relaxers, sleep aids, and pain relievers.  Call the doctor if there are any signs of confusion or unusual changes in behavior.  Tell the doctor or pharmacist if you are pregnant, planning to be pregnant, or breastfeeding.  Ask your pharmacist if this medicine can interact with any of your other medicines. Be sure to tell them about all the medicines you take.  Please tell all your doctors and dentists that you are on this medicine before they provide care.  Do not start or stop any other medicines without first speaking to your doctor or pharmacist.  This medicine should be used with caution in patients with breathing difficulties.  Call your doctor right away if you notice slow or shallow breathing.  Used needles and syringes should be thrown away properly in a medical waste container. Ask your doctor or pharmacist if you need help.  Do not share this medicine with anyone who has not been prescribed this medicine.  Side Effects  The following is a list of some common side effects from this medicine. Please speak with your doctor about what you should do if you experience these or other side effects.    constipation    dizziness    drowsiness or sedation    dry mouth    reaction at the area of the injection (pain, redness, swelling)    nausea    sweating    vomiting  Call your doctor or get medical help right away if you notice any of these more serious side effects:    decreased  appetite    breathing interruption during sleep    shallow, irregular breathing    changes in memory, mood, or thinking    difficulty concentrating    fainting    hallucinations (unusual thoughts, seeing or hearing things that are not real)    fast or irregular heart beats    slow heartbeat    seizures    shortness of breath    severe stomach or bowel pain    unusual or unexplained tiredness or weakness    difficulty or discomfort urinating    blurring or changes of vision    weight loss  A few people may have an allergic reactions to this medicine. Symptoms can include difficulty breathing, skin rash, itching, swelling, or severe dizziness. If you notice any of these symptoms, seek medical help quickly.  Extra  Please speak with your doctor, nurse, or pharmacist if you have any questions about this medicine.  [email or web address]  IMPORTANT NOTE: This document tells you briefly how to take your medicine, but it does not tell you all there is to know about it.Your doctor or pharmacist may give you other documents about your medicine. Please talk to them if you have any questions.Always follow their advice. There is a more complete description of this medicine available in English.Scan this code on your smartphone or tablet or use the web address below. You can also ask your pharmacist for a printout. If you have any questions, please ask your pharmacist.     2021 [employer].

## 2021-12-23 NOTE — TELEPHONE ENCOUNTER
Writer talks with Dr. Duncan about the possibility of home care for Jennifer.  [facility] is seeing patient every other Saturday for Desferal.  Writer speaks with Amparo from home infusion and she will call Dr. Duncan's office and get the approval to see patient more frequently and to help [facility] with setting up warfarin and drawing INR's.  Amparo reports the first day they are able to see patient would be on 12/28.

## 2021-12-24 ENCOUNTER — ANTICOAGULATION THERAPY VISIT (OUTPATIENT)
Dept: ANTICOAGULATION | Facility: CLINIC | Age: 21
End: 2021-12-24

## 2021-12-24 ENCOUNTER — INFUSION THERAPY VISIT (OUTPATIENT)
Dept: INFUSION THERAPY | Facility: CLINIC | Age: 21
End: 2021-12-24
Attending: PEDIATRICS
Payer: COMMERCIAL

## 2021-12-24 ENCOUNTER — PATIENT OUTREACH (OUTPATIENT)
Dept: CARE COORDINATION | Facility: CLINIC | Age: 21
End: 2021-12-24

## 2021-12-24 ENCOUNTER — TELEPHONE (OUTPATIENT)
Dept: ONCOLOGY | Facility: CLINIC | Age: 21
End: 2021-12-24
Payer: COMMERCIAL

## 2021-12-24 VITALS
DIASTOLIC BLOOD PRESSURE: 77 MMHG | RESPIRATION RATE: 16 BRPM | HEART RATE: 114 BPM | TEMPERATURE: 98.5 F | OXYGEN SATURATION: 92 % | SYSTOLIC BLOOD PRESSURE: 127 MMHG

## 2021-12-24 DIAGNOSIS — I27.82 CHRONIC PULMONARY EMBOLISM WITHOUT ACUTE COR PULMONALE, UNSPECIFIED PULMONARY EMBOLISM TYPE (H): ICD-10-CM

## 2021-12-24 DIAGNOSIS — D57.00 SICKLE CELL PAIN CRISIS (H): ICD-10-CM

## 2021-12-24 DIAGNOSIS — I27.82 CHRONIC PULMONARY EMBOLISM WITHOUT ACUTE COR PULMONALE, UNSPECIFIED PULMONARY EMBOLISM TYPE (H): Primary | ICD-10-CM

## 2021-12-24 DIAGNOSIS — G81.10 SPASTIC HEMIPLEGIA, UNSPECIFIED ETIOLOGY, UNSPECIFIED LATERALITY (H): Primary | ICD-10-CM

## 2021-12-24 LAB — INR PPP: 1.26 (ref 0.85–1.15)

## 2021-12-24 PROCEDURE — 96361 HYDRATE IV INFUSION ADD-ON: CPT

## 2021-12-24 PROCEDURE — 258N000003 HC RX IP 258 OP 636: Performed by: PEDIATRICS

## 2021-12-24 PROCEDURE — 36591 DRAW BLOOD OFF VENOUS DEVICE: CPT

## 2021-12-24 PROCEDURE — 250N000011 HC RX IP 250 OP 636: Performed by: PEDIATRICS

## 2021-12-24 PROCEDURE — 96374 THER/PROPH/DIAG INJ IV PUSH: CPT

## 2021-12-24 PROCEDURE — 96376 TX/PRO/DX INJ SAME DRUG ADON: CPT

## 2021-12-24 PROCEDURE — 85610 PROTHROMBIN TIME: CPT

## 2021-12-24 RX ORDER — MORPHINE SULFATE 2 MG/ML
2 INJECTION, SOLUTION INTRAMUSCULAR; INTRAVENOUS
Status: CANCELLED
Start: 2022-01-01

## 2021-12-24 RX ORDER — HEPARIN SODIUM (PORCINE) LOCK FLUSH IV SOLN 100 UNIT/ML 100 UNIT/ML
5 SOLUTION INTRAVENOUS
Status: CANCELLED | OUTPATIENT
Start: 2022-01-01

## 2021-12-24 RX ORDER — HEPARIN SODIUM,PORCINE 10 UNIT/ML
5 VIAL (ML) INTRAVENOUS
Status: CANCELLED | OUTPATIENT
Start: 2022-01-01

## 2021-12-24 RX ORDER — HEPARIN SODIUM (PORCINE) LOCK FLUSH IV SOLN 100 UNIT/ML 100 UNIT/ML
5 SOLUTION INTRAVENOUS
Status: DISCONTINUED | OUTPATIENT
Start: 2021-12-24 | End: 2021-12-24 | Stop reason: HOSPADM

## 2021-12-24 RX ORDER — WARFARIN SODIUM 5 MG/1
TABLET ORAL
Qty: 60 TABLET | Refills: 1 | Status: ON HOLD | OUTPATIENT
Start: 2021-12-24 | End: 2022-01-31

## 2021-12-24 RX ORDER — ONDANSETRON 8 MG/1
8 TABLET, FILM COATED ORAL
Status: CANCELLED
Start: 2022-01-01

## 2021-12-24 RX ORDER — DIPHENHYDRAMINE HCL 25 MG
25 CAPSULE ORAL
Status: CANCELLED
Start: 2022-01-01

## 2021-12-24 RX ORDER — MORPHINE SULFATE 2 MG/ML
2 INJECTION, SOLUTION INTRAMUSCULAR; INTRAVENOUS
Status: COMPLETED | OUTPATIENT
Start: 2021-12-24 | End: 2021-12-24

## 2021-12-24 RX ADMIN — MORPHINE SULFATE 2 MG: 2 INJECTION, SOLUTION INTRAMUSCULAR; INTRAVENOUS at 09:46

## 2021-12-24 RX ADMIN — MORPHINE SULFATE 2 MG: 2 INJECTION, SOLUTION INTRAMUSCULAR; INTRAVENOUS at 11:28

## 2021-12-24 RX ADMIN — DEXTROSE AND SODIUM CHLORIDE 500 ML: 5; 450 INJECTION, SOLUTION INTRAVENOUS at 09:56

## 2021-12-24 RX ADMIN — Medication 5 ML: at 11:58

## 2021-12-24 RX ADMIN — MORPHINE SULFATE 2 MG: 2 INJECTION, SOLUTION INTRAMUSCULAR; INTRAVENOUS at 10:33

## 2021-12-24 ASSESSMENT — PAIN SCALES - GENERAL: PAINLEVEL: EXTREME PAIN (9)

## 2021-12-24 NOTE — LETTER
12/24/2021         RE: Jennifer Cervantes  [address]  [facility] [address]        Dear Colleague,    Thank you for referring your patient, Jennifre Cervantes, to the [facility]. Please see a copy of my visit note below.    Infusion Nursing Note:  Jennifer Cervantes presents today for IVF+Pain medication.    Patient seen by provider today: No   present during visit today: Not Applicable.    Note: IVF given over 2 hours.      Intravenous Access:  Implanted Port.    Treatment Conditions:  Not Applicable.      Post Infusion Assessment:  Patient tolerated infusion without incident.  Site patent and intact, free from redness, edema or discomfort.  No evidence of extravasations.  Access discontinued per protocol.       Discharge Plan:   Discharge instructions reviewed with: Patient.  Patient and/or family verbalized understanding of discharge instructions and all questions answered.  Patient discharged in stable condition accompanied by: self.  Departure Mode: Ambulatory.    Administrations This Visit     dextrose 5% and 0.45% NaCl BOLUS     Admin Date  12/24/2021 Action  New Bag Dose  500 mL Rate  250 mL/hr Route  Intravenous Administered By  Neyda Sheriff RN          heparin 100 UNIT/ML injection 5 mL     Admin Date  12/24/2021 Action  Given Dose  5 mL Route  Intracatheter Administered By  Neyda Sheriff RN          morphine (PF) injection 2 mg     Admin Date  12/24/2021 Action  Given Dose  2 mg Route  Intravenous Administered By  Neyda Sheriff RN           Admin Date  12/24/2021 Action  Given Dose  2 mg Route  Intravenous Administered By  Neyda Sheriff RN           Admin Date  12/24/2021 Action  Given Dose  2 mg Route  Intravenous Administered By  Neyda Sheriff RN Hope Balcos, RN                          Again, thank you for allowing me to participate in the care of your patient.        Sincerely,        [facility]

## 2021-12-24 NOTE — PROGRESS NOTES
ANTICOAGULATION MANAGEMENT     Jennifer Cervantes 21 year old female is on warfarin with subtherapeutic INR result. (Goal INR 2.0-3.0)    Recent labs: (last 7 days)     12/24/21  0941   INR 1.26*       ASSESSMENT     Source(s): Chart Review and Patient/Caregiver Call       Warfarin doses taken: Missed dose(s) may be affecting INR    Diet: No new diet changes identified    New illness, injury, or hospitalization: No    Medication/supplement changes: None noted    Signs or symptoms of bleeding or clotting: No    Previous INR: Subtherapeutic    Additional findings: Refill needed today     PLAN     Recommended plan for no diet, medication or health factor changes affecting INR     Dosing Instructions: Recommended patient take 15mg one time boost today.  See Dr. Duncan office visit on 12/20/21.  Dosing pattern per provider is 12.5mg daily for ease and understanding.  Recommend single daily dose. with next INR in 4 days       Summary  As of 12/24/2021    Full warfarin instructions:  12/24: 15 mg; Otherwise 12.5 mg every day   Next INR check:  12/28/2021             Telephone call with Jennifer who agrees to plan and repeated back plan correctly    Orders given to  Homecare nurse/facility to recheck    Education provided: Importance of taking warfarin as instructed    Plan made with [facility] Pharmacist Prince Arndt, RN  Anticoagulation Clinic  12/24/2021    _______________________________________________________________________     Anticoagulation Episode Summary     Current INR goal:  2.0-3.0   TTR:  0.0 % (3.3 wk)   Target end date:  Indefinite   Send INR reminders to:  [facility]    Indications    Chronic pulmonary embolism without acute cor pulmonale  unspecified pulmonary embolism type (H) [I27.82]           Comments:  [facility] center [phone]  [facility] [phone]         Anticoagulation Care Providers     Provider Role Specialty Phone number    Eric Duncan MD Referring Pediatric  Hematology-Oncology [phone]

## 2021-12-24 NOTE — TELEPHONE ENCOUNTER
Pt called in to triage requesting IVF pain meds for lower back pain rated 9/10 x  Several days. Stated last took prn oxycodone at 6 a.m. this morning without relief. Denied any fevers, chills, cough, sob, chest pain, numbness or tingling or other symptoms not typical of sickle cell pain.   Pt's last infusion was 12/23, last clinic visit 12/20/21 with follow-up on not scheduled yet.  Patient states they do not have own ride and it will take 20-25 long to get to cancer clinic.     Pt denied being out of home medications and needing any refills today.     Paged Dr. Duncan.    If you do not hear from the infusion center by 2pm then you will not be able to get in for an infusion today.     Infusion can take pt at 0900 or 0930.  Jennifer confirmed she can come in for infusion.  Paged ANDREAS--okay for pt to come in for infusion.    IB sent to scheduling  Paged SW to assist pt with ride.    Routed to Dr. Duncan and [facility]

## 2021-12-24 NOTE — PROGRESS NOTES
Social Work Note: Telephone Call  Oncology Clinic     Data/Intervention:  Patient Name:  Jennifer Cervantes   /Age: 2000, 21 years old     Call From: [facility]        Reason for Call:  Transportation     Assessment:  Jennifer has an appointment this morning at 9:30 am. SW attempted to order patient a cab using taxi voucher through [employer]. However, it was taking a while for a  to get assigned to patient. This could be due to it being [place]. Patient stated that they're mother could offer them a lift so that they don't lose their spot. SW arranged a will call return ride for patient with [employer] ([phone]). Patient can call once they are finished with their appointment for their ride home. SW relayed this information to patient who verbalized understanding.     Plan:  Patient is aware of the transportation plan.  available to assist with any other needs.      CARLOS Chavez,SW  Hematology/Oncology Social Worker  Phone:[phone] Pager: [phone]

## 2021-12-24 NOTE — PROGRESS NOTES
Infusion Nursing Note:  Jennifer Cervantes presents today for IVF+Pain medication.    Patient seen by provider today: No   present during visit today: Not Applicable.    Note: IVF given over 2 hours.      Intravenous Access:  Implanted Port.    Treatment Conditions:  Not Applicable.      Post Infusion Assessment:  Patient tolerated infusion without incident.  Site patent and intact, free from redness, edema or discomfort.  No evidence of extravasations.  Access discontinued per protocol.       Discharge Plan:   Discharge instructions reviewed with: Patient.  Patient and/or family verbalized understanding of discharge instructions and all questions answered.  Patient discharged in stable condition accompanied by: self.  Departure Mode: Ambulatory.    Administrations This Visit     dextrose 5% and 0.45% NaCl BOLUS     Admin Date  12/24/2021 Action  New Bag Dose  500 mL Rate  250 mL/hr Route  Intravenous Administered By  Neyda Sheriff RN          heparin 100 UNIT/ML injection 5 mL     Admin Date  12/24/2021 Action  Given Dose  5 mL Route  Intracatheter Administered By  Neyda Sheriff RN          morphine (PF) injection 2 mg     Admin Date  12/24/2021 Action  Given Dose  2 mg Route  Intravenous Administered By  Neyda Sheriff RN           Admin Date  12/24/2021 Action  Given Dose  2 mg Route  Intravenous Administered By  Neyda Sheirff RN           Admin Date  12/24/2021 Action  Given Dose  2 mg Route  Intravenous Administered By  Neyda Sheriff RN Hope Balcos, RN

## 2021-12-25 ENCOUNTER — HOSPITAL ENCOUNTER (EMERGENCY)
Facility: CLINIC | Age: 21
Discharge: HOME | End: 2021-12-25
Attending: FAMILY MEDICINE | Admitting: FAMILY MEDICINE
Payer: COMMERCIAL

## 2021-12-25 VITALS
HEIGHT: 64 IN | SYSTOLIC BLOOD PRESSURE: 142 MMHG | OXYGEN SATURATION: 98 % | BODY MASS INDEX: 29.02 KG/M2 | RESPIRATION RATE: 16 BRPM | DIASTOLIC BLOOD PRESSURE: 84 MMHG | TEMPERATURE: 97.7 F | WEIGHT: 170 LBS | HEART RATE: 89 BPM

## 2021-12-25 DIAGNOSIS — N39.0 ACUTE UTI: ICD-10-CM

## 2021-12-25 DIAGNOSIS — D57.00 SICKLE CELL PAIN CRISIS (H): ICD-10-CM

## 2021-12-25 LAB
ALBUMIN SERPL-MCNC: 4 G/DL (ref 3.4–5)
ALBUMIN UR-MCNC: NEGATIVE MG/DL
ALP SERPL-CCNC: 80 U/L (ref 40–150)
ALT SERPL W P-5'-P-CCNC: 59 U/L (ref 0–50)
ANION GAP SERPL CALCULATED.3IONS-SCNC: 7 MMOL/L (ref 3–14)
APPEARANCE UR: ABNORMAL
AST SERPL W P-5'-P-CCNC: 62 U/L (ref 0–45)
BACTERIA #/AREA URNS HPF: ABNORMAL /HPF
BASOPHILS # BLD AUTO: 0.2 10E3/UL (ref 0–0.2)
BASOPHILS NFR BLD AUTO: 2 %
BILIRUB SERPL-MCNC: 3.4 MG/DL (ref 0.2–1.3)
BILIRUB UR QL STRIP: NEGATIVE
BUN SERPL-MCNC: 6 MG/DL (ref 7–30)
CALCIUM SERPL-MCNC: 8.6 MG/DL (ref 8.5–10.1)
CHLORIDE BLD-SCNC: 112 MMOL/L (ref 94–109)
CO2 SERPL-SCNC: 21 MMOL/L (ref 20–32)
COLOR UR AUTO: ABNORMAL
CREAT SERPL-MCNC: 0.54 MG/DL (ref 0.52–1.04)
EOSINOPHIL # BLD AUTO: 0.6 10E3/UL (ref 0–0.7)
EOSINOPHIL NFR BLD AUTO: 5 %
ERYTHROCYTE [DISTWIDTH] IN BLOOD BY AUTOMATED COUNT: 23.3 % (ref 10–15)
GFR SERPL CREATININE-BSD FRML MDRD: >90 ML/MIN/1.73M2
GLUCOSE BLD-MCNC: 90 MG/DL (ref 70–99)
GLUCOSE UR STRIP-MCNC: NEGATIVE MG/DL
HCG UR QL: NEGATIVE
HCT VFR BLD AUTO: 22.6 % (ref 35–47)
HGB BLD-MCNC: 8 G/DL (ref 11.7–15.7)
HGB UR QL STRIP: NEGATIVE
IMM GRANULOCYTES # BLD: 0.1 10E3/UL
IMM GRANULOCYTES NFR BLD: 1 %
KETONES UR STRIP-MCNC: NEGATIVE MG/DL
LEUKOCYTE ESTERASE UR QL STRIP: ABNORMAL
LYMPHOCYTES # BLD AUTO: 1.9 10E3/UL (ref 0.8–5.3)
LYMPHOCYTES NFR BLD AUTO: 18 %
MCH RBC QN AUTO: 31.6 PG (ref 26.5–33)
MCHC RBC AUTO-ENTMCNC: 35.4 G/DL (ref 31.5–36.5)
MCV RBC AUTO: 89 FL (ref 78–100)
MONOCYTES # BLD AUTO: 0.9 10E3/UL (ref 0–1.3)
MONOCYTES NFR BLD AUTO: 9 %
NEUTROPHILS # BLD AUTO: 6.6 10E3/UL (ref 1.6–8.3)
NEUTROPHILS NFR BLD AUTO: 65 %
NITRATE UR QL: NEGATIVE
NRBC # BLD AUTO: 0.5 10E3/UL
NRBC BLD AUTO-RTO: 5 /100
PH UR STRIP: 5.5 [PH] (ref 5–7)
PLATELET # BLD AUTO: 355 10E3/UL (ref 150–450)
POTASSIUM BLD-SCNC: 3.9 MMOL/L (ref 3.4–5.3)
PROT SERPL-MCNC: 7.9 G/DL (ref 6.8–8.8)
RBC # BLD AUTO: 2.53 10E6/UL (ref 3.8–5.2)
RBC URINE: 2 /HPF
RETICS # AUTO: 0.52 10E6/UL (ref 0.03–0.1)
RETICS/RBC NFR AUTO: 20.7 % (ref 0.5–2)
SODIUM SERPL-SCNC: 140 MMOL/L (ref 133–144)
SP GR UR STRIP: 1.01 (ref 1–1.03)
SQUAMOUS EPITHELIAL: 3 /HPF
UROBILINOGEN UR STRIP-MCNC: NORMAL MG/DL
WBC # BLD AUTO: 10.3 10E3/UL (ref 4–11)
WBC URINE: 10 /HPF

## 2021-12-25 PROCEDURE — 96361 HYDRATE IV INFUSION ADD-ON: CPT | Performed by: FAMILY MEDICINE

## 2021-12-25 PROCEDURE — 250N000009 HC RX 250: Performed by: FAMILY MEDICINE

## 2021-12-25 PROCEDURE — 81025 URINE PREGNANCY TEST: CPT | Performed by: FAMILY MEDICINE

## 2021-12-25 PROCEDURE — 85045 AUTOMATED RETICULOCYTE COUNT: CPT | Performed by: FAMILY MEDICINE

## 2021-12-25 PROCEDURE — 96375 TX/PRO/DX INJ NEW DRUG ADDON: CPT | Performed by: FAMILY MEDICINE

## 2021-12-25 PROCEDURE — 82040 ASSAY OF SERUM ALBUMIN: CPT | Performed by: FAMILY MEDICINE

## 2021-12-25 PROCEDURE — 85025 COMPLETE CBC W/AUTO DIFF WBC: CPT | Performed by: FAMILY MEDICINE

## 2021-12-25 PROCEDURE — 96376 TX/PRO/DX INJ SAME DRUG ADON: CPT | Performed by: FAMILY MEDICINE

## 2021-12-25 PROCEDURE — 99285 EMERGENCY DEPT VISIT HI MDM: CPT | Mod: 25 | Performed by: FAMILY MEDICINE

## 2021-12-25 PROCEDURE — 250N000011 HC RX IP 250 OP 636: Performed by: FAMILY MEDICINE

## 2021-12-25 PROCEDURE — 87086 URINE CULTURE/COLONY COUNT: CPT | Performed by: FAMILY MEDICINE

## 2021-12-25 PROCEDURE — 81001 URINALYSIS AUTO W/SCOPE: CPT | Performed by: FAMILY MEDICINE

## 2021-12-25 PROCEDURE — 99285 EMERGENCY DEPT VISIT HI MDM: CPT | Performed by: FAMILY MEDICINE

## 2021-12-25 PROCEDURE — 96374 THER/PROPH/DIAG INJ IV PUSH: CPT | Performed by: FAMILY MEDICINE

## 2021-12-25 PROCEDURE — 258N000003 HC RX IP 258 OP 636: Performed by: FAMILY MEDICINE

## 2021-12-25 PROCEDURE — 36415 COLL VENOUS BLD VENIPUNCTURE: CPT | Performed by: FAMILY MEDICINE

## 2021-12-25 RX ORDER — HEPARIN SODIUM (PORCINE) LOCK FLUSH IV SOLN 100 UNIT/ML 100 UNIT/ML
5-10 SOLUTION INTRAVENOUS
Status: DISCONTINUED | OUTPATIENT
Start: 2021-12-25 | End: 2021-12-25 | Stop reason: HOSPADM

## 2021-12-25 RX ORDER — KETOROLAC TROMETHAMINE 15 MG/ML
15 INJECTION, SOLUTION INTRAMUSCULAR; INTRAVENOUS ONCE
Status: COMPLETED | OUTPATIENT
Start: 2021-12-25 | End: 2021-12-25

## 2021-12-25 RX ORDER — LIDOCAINE 40 MG/G
CREAM TOPICAL
Status: DISCONTINUED | OUTPATIENT
Start: 2021-12-25 | End: 2021-12-25 | Stop reason: HOSPADM

## 2021-12-25 RX ORDER — CEFDINIR 300 MG/1
300 CAPSULE ORAL 2 TIMES DAILY
Qty: 14 CAPSULE | Refills: 0 | Status: SHIPPED | OUTPATIENT
Start: 2021-12-25 | End: 2021-12-27

## 2021-12-25 RX ORDER — ONDANSETRON 2 MG/ML
8 INJECTION INTRAMUSCULAR; INTRAVENOUS
Status: DISCONTINUED | OUTPATIENT
Start: 2021-12-25 | End: 2021-12-25 | Stop reason: HOSPADM

## 2021-12-25 RX ORDER — OXYCODONE HYDROCHLORIDE 10 MG/1
20 TABLET ORAL ONCE
Status: DISCONTINUED | OUTPATIENT
Start: 2021-12-25 | End: 2021-12-25 | Stop reason: HOSPADM

## 2021-12-25 RX ADMIN — Medication 5 MG: at 14:46

## 2021-12-25 RX ADMIN — Medication 5 ML: at 15:31

## 2021-12-25 RX ADMIN — KETOROLAC TROMETHAMINE 15 MG: 15 INJECTION, SOLUTION INTRAMUSCULAR; INTRAVENOUS at 13:51

## 2021-12-25 RX ADMIN — Medication 5 MG: at 13:12

## 2021-12-25 RX ADMIN — SODIUM CHLORIDE, POTASSIUM CHLORIDE, SODIUM LACTATE AND CALCIUM CHLORIDE 1000 ML: 600; 310; 30; 20 INJECTION, SOLUTION INTRAVENOUS at 12:49

## 2021-12-25 ASSESSMENT — ENCOUNTER SYMPTOMS
ACTIVITY CHANGE: 1
DECREASED CONCENTRATION: 0
SORE THROAT: 0
DYSURIA: 0
COUGH: 0
FEVER: 0
NECK PAIN: 0
EYE REDNESS: 0
WEAKNESS: 1
DYSPHORIC MOOD: 0
CONFUSION: 0
WOUND: 0
ARTHRALGIAS: 1
COLOR CHANGE: 0
DIAPHORESIS: 0
TROUBLE SWALLOWING: 0
BRUISES/BLEEDS EASILY: 1
VOMITING: 0
NECK STIFFNESS: 0
SINUS PRESSURE: 0
LIGHT-HEADEDNESS: 0
BACK PAIN: 1
HEADACHES: 0
APPETITE CHANGE: 0
HALLUCINATIONS: 0
DIFFICULTY URINATING: 0
MYALGIAS: 0
ABDOMINAL PAIN: 0
JOINT SWELLING: 0
PHOTOPHOBIA: 0
SHORTNESS OF BREATH: 0

## 2021-12-25 ASSESSMENT — MIFFLIN-ST. JEOR: SCORE: 1516.11

## 2021-12-25 NOTE — ED PROVIDER NOTES
[place] EMERGENCY DEPARTMENT ([facility])  12/25/21    History     Chief Complaint   Patient presents with     Sickle Cell Pain Crisis     The history is provided by the patient and medical records.     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 Patient reports crisis began around 7-8 am this morning. She states she took her home medications without relief. Patient reports she was at infusion center yesterday and she received IV fluids and morphine. Patient reports pain is in her back, legs, and arms. No arthralgias.  No joint swellings.  She denies chest pain, cough, shortness of breath, vomiting, dysuria, or weakness. No fevers.    Past Medical History  Past Medical History:   Diagnosis Date     Anxiety      Bleeding disorder (H)      Blood clotting disorder (H)      Cerebral infarction (H) 2015     Cognitive developmental delay     low IQ. 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 (H)     right hand contractures     Iron overload due to repeated red blood cell transfusions      Migraines      Multiple subsegmental pulmonary emboli without acute cor pulmonale (H) 02/01/2021     Oppositional defiant behavior      Superficial venous thrombosis of arm, right 03/25/2021     Uncomplicated asthma      Past Surgical History:   Procedure Laterality Date     AS INSERT TUNNELED CV 2 CATH W/O PORT/PUMP       CHOLECYSTECTOMY       CV RIGHT HEART CATH MEASUREMENTS RECORDED N/A 11/18/2021    Procedure: Right Heart Cath;  Surgeon: Jackson Stauffer MD;  Location:  HEART CARDIAC CATH LAB     INSERT PORT VASCULAR ACCESS Left 4/21/2021    Procedure: INSERTION, VASCULAR ACCESS PORT (NOT SURE ON SIDE UNTIL  REMOVAL);  Surgeon: Rajan More MD;  Location: [facility] OR     IR CHEST PORT PLACEMENT > 5 YRS OF AGE  4/21/2021     IR CVC NON TUNNEL LINE REMOVAL  5/6/2021     IR CVC NON TUNNEL PLACEMENT  04/07/2020     IR CVC NON TUNNEL PLACEMENT  4/30/2021     IR PORT REMOVAL LEFT  4/21/2021     REMOVE PORT VASCULAR ACCESS Left 4/21/2021    Procedure: REMOVAL, VASCULAR ACCESS PORT LEFT;  Surgeon: Rajan More MD;  Location: [facility] OR     REPAIR TENDON ELBOW Right 10/02/2019    Procedure: Right Elbow Flexor Lengthening, Flexor Pronator Slide Of Wrist and Finger, Thumb Adductor Lengthening;  Surgeon: Anai Franco MD;  Location: [facility] OR     TONSILLECTOMY Bilateral 10/02/2019    Procedure: Bilateral Tonsillectomy;  Surgeon: Farhana Guy MD;  Location: [facility] OR     ZZC BREAST REDUCTION (INCLUDES LIPO) TIER 3 Bilateral 04/23/2019     acetaminophen (TYLENOL) 325 MG tablet  albuterol (PROAIR HFA/PROVENTIL HFA/VENTOLIN HFA) 108 (90 Base) MCG/ACT inhaler  albuterol (PROVENTIL) (2.5 MG/3ML) 0.083% neb solution  ARIPiprazole (ABILIFY) 2 MG tablet  budesonide-formoterol (SYMBICORT) 160-4.5 MCG/ACT Inhaler  cefdinir (OMNICEF) 300 MG capsule  deferasirox (JADENU) 360 MG tablet  diphenhydrAMINE (BENADRYL) 25 MG capsule  EPINEPHrine (ANY BX GENERIC EQUIV) 0.3 MG/0.3ML injection 2-pack  Hydroxyurea 1000 MG TABS  hydrOXYzine (ATARAX) 25 MG tablet  [START ON 12/28/2021] morphine (MS CONTIN) 15 MG CR tablet  naloxone (NARCAN) 4 MG/0.1ML nasal spray  omeprazole (PRILOSEC) 20 MG DR capsule  ondansetron (ZOFRAN) 8 MG tablet  oxyCODONE IR (ROXICODONE) 15 MG tablet  sertraline (ZOLOFT) 100 MG tablet  warfarin ANTICOAGULANT (COUMADIN) 5 MG tablet  warfarin ANTICOAGULANT (COUMADIN) 5 MG tablet  warfarin ANTICOAGULANT (COUMADIN) 7.5 MG tablet  lidocaine-prilocaine (EMLA) 2.5-2.5 % external cream  medroxyPROGESTERone (DEPO-PROVERA) 150 MG/ML IM injection      Allergies   Allergen Reactions     Contrast Dye      Hives and breathing issues      Fish-Derived Products Hives     Seafood Hives     Diagnostic X-Ray Materials      Gadolinium      Family History  Family History   Problem Relation Age of Onset     Sickle Cell Trait Mother      Hypertension Mother      Asthma Mother      Sickle Cell Trait Father      Social History   Social History     Tobacco Use     Smoking status: Never Smoker     Smokeless tobacco: Never Used   Substance Use Topics     Alcohol use: Not Currently     Alcohol/week: 0.0 standard drinks     Drug use: Never      Past medical history, past surgical history, medications, allergies, family history, and social history were reviewed with the patient. No additional pertinent items.       Review of Systems   Constitutional: Positive for activity change. Negative for appetite change, diaphoresis and fever.   HENT: Negative for congestion, sinus pressure, sore throat and trouble swallowing.    Eyes: Negative for photophobia, redness and visual disturbance.   Respiratory: Negative for cough and shortness of breath.    Cardiovascular: Negative for chest pain.   Gastrointestinal: Negative for abdominal pain and vomiting.   Genitourinary: Negative for difficulty urinating and dysuria.   Musculoskeletal: Positive for arthralgias and back pain. Negative for gait problem, joint swelling, myalgias, neck pain and neck stiffness.        Positive for bilateral leg pain.  Positive for bilateral arm pain.   Skin: Negative for color change, rash and wound.   Allergic/Immunologic: Negative for immunocompromised state.   Neurological: Positive for weakness (right sided unchanged). Negative for syncope, light-headedness and headaches.   Hematological: Bruises/bleeds easily.   Psychiatric/Behavioral: Negative for confusion, decreased concentration, dysphoric mood and hallucinations.   All other systems reviewed and are negative.    A complete review of systems was performed with pertinent positives and negatives noted in the HPI, and all other systems  "negative.    Physical Exam   BP: 136/89  Pulse: 119  Temp: 98.6  F (37  C)  Resp: 16  Height: 162.6 cm (5' 4\")  Weight: 77.1 kg (170 lb)  SpO2: 94 %  Physical Exam  Vitals and nursing note reviewed.   Constitutional:       General: She is in acute distress.      Appearance: She is well-developed. She is not toxic-appearing or diaphoretic.      Comments: Patient underwent x3 is uncomfortable but nontoxic in the ER.  Right-sided extremity weakness noted stable   HENT:      Head: Normocephalic and atraumatic.      Nose: No congestion.      Mouth/Throat:      Mouth: Mucous membranes are moist.      Pharynx: Oropharynx is clear.   Eyes:      General: Scleral icterus present.      Pupils: Pupils are equal, round, and reactive to light.   Cardiovascular:      Rate and Rhythm: Normal rate and regular rhythm.   Pulmonary:      Effort: No respiratory distress.      Breath sounds: No stridor.   Abdominal:      General: There is no distension.      Palpations: Abdomen is soft.      Tenderness: There is no abdominal tenderness. There is no guarding.   Musculoskeletal:         General: Tenderness present. No swelling or signs of injury.      Cervical back: Normal range of motion and neck supple. No rigidity or tenderness.      Comments: Patient with some low back tenderness etc. hips without swelling general arthralgic primarily   Skin:     General: Skin is warm and dry.      Capillary Refill: Capillary refill takes less than 2 seconds.      Coloration: Skin is not pale.      Findings: No erythema or rash.   Neurological:      Mental Status: She is alert and oriented to person, place, and time. Mental status is at baseline.      Comments: Patient with right-sided weakness noted stable.   Psychiatric:      Comments: Appropriate soft spoken           ED Course   11:56 AM  The patient was seen and examined by Pedro Ryan MD in Room ED23.   Patient I went in the ER.  IV established.  Reviewed records patient last several times is " received ketamine 5 mg x 1 or 2 doses for pain control also.  Here in the ER we all protocol initially ordered 20 mg of oxycodone although patient is just taken earlier therefore she declined this.  Did receive Zofran for nausea IV fluids lactated Ringer's.  Patient also after renal function within normal limits to receive Toradol 15 mg IV.  Patient received ketamine 5 mg IV x2 doses feeling better here in the ER.  Reassessment this point were waiting for the urinalysis otherwise planning patient feels comfortably discharged with labs being reviewed as noted below without any obvious acute changes.  All appear stable including reticulocyte count etc.    Urinalysis does show some mild concerns for early infection.  Urine culture was ordered.  Sodium 140 potassium noted 3.9.  Creatinine 0.54.  AST 62 ALT is 59 with total bilirubin 3.4.  Reticulocyte count 28.7% with absolute reticular count of 0.524.  Pregnancy test negative.  White count 10.3 hemoglobin is 8.      More likely sickle cell crisis type symptoms now improved.  Will discharge with follow-up with Dr. Duncan.    Discussed UTI treatment patient agrees will prescribe cefdinir for patient for a week.  Patient to follow-up with hematology as noted above.  Is comfortable this plan feeling much better and discharge is noted.    Procedures                     Results for orders placed or performed during the hospital encounter of 12/25/21   Comprehensive metabolic panel     Status: Abnormal   Result Value Ref Range    Sodium 140 133 - 144 mmol/L    Potassium 3.9 3.4 - 5.3 mmol/L    Chloride 112 (H) 94 - 109 mmol/L    Carbon Dioxide (CO2) 21 20 - 32 mmol/L    Anion Gap 7 3 - 14 mmol/L    Urea Nitrogen 6 (L) 7 - 30 mg/dL    Creatinine 0.54 0.52 - 1.04 mg/dL    Calcium 8.6 8.5 - 10.1 mg/dL    Glucose 90 70 - 99 mg/dL    Alkaline Phosphatase 80 40 - 150 U/L    AST 62 (H) 0 - 45 U/L    ALT 59 (H) 0 - 50 U/L    Protein Total 7.9 6.8 - 8.8 g/dL    Albumin 4.0 3.4 - 5.0  g/dL    Bilirubin Total 3.4 (H) 0.2 - 1.3 mg/dL    GFR Estimate >90 >60 mL/min/1.73m2   HCG qualitative urine     Status: Normal   Result Value Ref Range    hCG Urine Qualitative Negative Negative   UA with Microscopic reflex to Culture     Status: Abnormal    Specimen: Urine, Clean Catch   Result Value Ref Range    Color Urine Orange (A) Colorless, Straw, Light Yellow, Yellow    Appearance Urine Slightly Cloudy (A) Clear    Glucose Urine Negative Negative mg/dL    Bilirubin Urine Negative Negative    Ketones Urine Negative Negative mg/dL    Specific Gravity Urine 1.009 1.003 - 1.035    Blood Urine Negative Negative    pH Urine 5.5 5.0 - 7.0    Protein Albumin Urine Negative Negative mg/dL    Urobilinogen Urine Normal Normal, 2.0 mg/dL    Nitrite Urine Negative Negative    Leukocyte Esterase Urine Small (A) Negative    Bacteria Urine Few (A) None Seen /HPF    RBC Urine 2 <=2 /HPF    WBC Urine 10 (H) <=5 /HPF    Squamous Epithelials Urine 3 (H) <=1 /HPF    Narrative    Urine Culture not indicated   Reticulocyte count     Status: Abnormal   Result Value Ref Range    % Reticulocyte 20.7 (H) 0.5 - 2.0 %    Absolute Reticulocyte 0.524 (H) 0.025 - 0.095 10e6/uL   CBC with platelets and differential     Status: Abnormal   Result Value Ref Range    WBC Count 10.3 4.0 - 11.0 10e3/uL    RBC Count 2.53 (L) 3.80 - 5.20 10e6/uL    Hemoglobin 8.0 (L) 11.7 - 15.7 g/dL    Hematocrit 22.6 (L) 35.0 - 47.0 %    MCV 89 78 - 100 fL    MCH 31.6 26.5 - 33.0 pg    MCHC 35.4 31.5 - 36.5 g/dL    RDW 23.3 (H) 10.0 - 15.0 %    Platelet Count 355 150 - 450 10e3/uL    % Neutrophils 65 %    % Lymphocytes 18 %    % Monocytes 9 %    % Eosinophils 5 %    % Basophils 2 %    % Immature Granulocytes 1 %    NRBCs per 100 WBC 5 (H) <1 /100    Absolute Neutrophils 6.6 1.6 - 8.3 10e3/uL    Absolute Lymphocytes 1.9 0.8 - 5.3 10e3/uL    Absolute Monocytes 0.9 0.0 - 1.3 10e3/uL    Absolute Eosinophils 0.6 0.0 - 0.7 10e3/uL    Absolute Basophils 0.2 0.0 - 0.2  10e3/uL    Absolute Immature Granulocytes 0.1 <=0.4 10e3/uL    Absolute NRBCs 0.5 10e3/uL   CBC with platelets differential     Status: Abnormal    Narrative    The following orders were created for panel order CBC with platelets differential.  Procedure                               Abnormality         Status                     ---------                               -----------         ------                     CBC with platelets and d...[[id number]]  Abnormal            Final result                 Please view results for these tests on the individual orders.     Medications   lidocaine 1 % 0.1-1 mL (has no administration in time range)   lidocaine (LMX4) cream (has no administration in time range)   sodium chloride (PF) 0.9% PF flush 3 mL (3 mLs Intracatheter Given 12/25/21 1233)   sodium chloride (PF) 0.9% PF flush 3 mL (has no administration in time range)   ondansetron (ZOFRAN) injection 8 mg (has no administration in time range)   oxyCODONE IR (ROXICODONE) tablet 20 mg (20 mg Oral Not Given 12/25/21 1307)   heparin 100 UNIT/ML injection 5-10 mL (5 mLs Intracatheter Given 12/25/21 1531)   lactated ringers BOLUS 1,000 mL (0 mLs Intravenous Stopped 12/25/21 1531)   ketamine (KETALAR) injection 5 mg (5 mg Intravenous Given 12/25/21 1312)   ketorolac (TORADOL) injection 15 mg (15 mg Intravenous Given 12/25/21 1351)   ketamine (KETALAR) injection 5 mg (5 mg Intravenous Given 12/25/21 1446)        Assessments & Plan (with Medical Decision Making)  21-year-old female history of sickle cell disease presents ER with recurrent symptoms.  Patient noted today starting symptoms of achiness in the back etc.  No chest syndrome at all no change as far as new neurological symptoms she has right-sided weakness which is stable without any other changes headache etc.  No fever Covid symptoms etc.  Patient valuated in the ER.  Exam is unremarkable at this point patient was treated following a protocol initially offered oxycodone  but she had taken this earlier just before coming in without relief.  Patient given Toradol Zofran IV fluids of lactated Ringer along with this ketamine 5 mg IV x2 improving of symptoms patient this point will follow up as outpatient with hematologist otherwise feels comfortable just currently waiting for urinalysis to make sure there is no sign of infection will treat accordingly.  Noted urinalysis concerning for infection urine culture was ordered.  Patient started cefdinir also agrees with plan comfortably discharge is noted follow-up with hematologist return if any concerns.       I have reviewed the nursing notes. I have reviewed the findings, diagnosis, plan and need for follow up with the patient.    Discharge Medication List as of 12/25/2021  3:54 PM      START taking these medications    Details   cefdinir (OMNICEF) 300 MG capsule Take 1 capsule (300 mg) by mouth 2 times daily For uti, Disp-14 capsule, R-0, Local Print             Final diagnoses:   Sickle cell pain crisis (H)   Acute UTI     ISisi, am serving as a trained medical scribe to document services personally performed by Pedro Ryan MD, based on the provider's statements to me.      I, Pedro Ryan MD, was physically present and have reviewed and verified the accuracy of this note documented by Sisi Alejandra.     --  Pedro Ryan MD  [facility] EMERGENCY DEPARTMENT  12/25/2021    This note was created at least in part by the use of dragon voice dictation system. Inadvertent typographical errors may still exist.  Pedro Ryan MD.    Patient evaluated in the emergency department during the COVID-19 pandemic period. Careful attention to patients safety was addressed throughout the evaluation. Evaluation and treatment management was initiated with disposition made efficiently and appropriate as possible to minimize any risk of potential exposure to patient during this evaluation.       Pedro Ryan MD  12/25/21  1640

## 2021-12-25 NOTE — ED TRIAGE NOTES
"Triage Assessment & Note:    /89   Pulse 119   Temp 98.6  F (37  C) (Oral)   Resp 16   Ht 1.626 m (5' 4\")   Wt 77.1 kg (170 lb)   SpO2 94%   BMI 29.18 kg/m        Patient presents with: Pt comes to triage with sickle cell pain in hips/ back/ and legs. No report of fever, cough, SOB, CP, or travel    Home Treatments/Remedies: Home medications    Febrile / Afebrile: afebrile    Duration of C/o: < 12 hrs    Jennifer Chauhan RN  December 25, 2021        "

## 2021-12-25 NOTE — DISCHARGE INSTRUCTIONS
Home.  Take the cefdinir for bladder infection treatment.  Push fluids.  Continue home medications and fluids and rest.  Contact Dr. Duncan  For follow up also.  REturn if any concerns.

## 2021-12-27 ENCOUNTER — NURSE TRIAGE (OUTPATIENT)
Dept: ONCOLOGY | Facility: CLINIC | Age: 21
End: 2021-12-27
Payer: COMMERCIAL

## 2021-12-27 ENCOUNTER — HOME INFUSION (PRE-WILLOW HOME INFUSION) (OUTPATIENT)
Dept: PHARMACY | Facility: CLINIC | Age: 21
End: 2021-12-27
Payer: COMMERCIAL

## 2021-12-27 ENCOUNTER — ANTICOAGULATION THERAPY VISIT (OUTPATIENT)
Dept: ANTICOAGULATION | Facility: CLINIC | Age: 21
End: 2021-12-27

## 2021-12-27 ENCOUNTER — PATIENT OUTREACH (OUTPATIENT)
Dept: CARE COORDINATION | Facility: CLINIC | Age: 21
End: 2021-12-27
Payer: COMMERCIAL

## 2021-12-27 ENCOUNTER — TELEPHONE (OUTPATIENT)
Dept: ANTICOAGULATION | Facility: CLINIC | Age: 21
End: 2021-12-27
Payer: COMMERCIAL

## 2021-12-27 ENCOUNTER — APPOINTMENT (OUTPATIENT)
Dept: LAB | Facility: CLINIC | Age: 21
End: 2021-12-27
Payer: COMMERCIAL

## 2021-12-27 ENCOUNTER — INFUSION THERAPY VISIT (OUTPATIENT)
Dept: INFUSION THERAPY | Facility: CLINIC | Age: 21
End: 2021-12-27
Payer: COMMERCIAL

## 2021-12-27 VITALS
SYSTOLIC BLOOD PRESSURE: 136 MMHG | RESPIRATION RATE: 18 BRPM | OXYGEN SATURATION: 94 % | BODY MASS INDEX: 28.95 KG/M2 | TEMPERATURE: 98.1 F | DIASTOLIC BLOOD PRESSURE: 90 MMHG | WEIGHT: 168.65 LBS | HEART RATE: 111 BPM

## 2021-12-27 DIAGNOSIS — D57.00 SICKLE CELL PAIN CRISIS (H): ICD-10-CM

## 2021-12-27 DIAGNOSIS — N30.00 ACUTE CYSTITIS WITHOUT HEMATURIA: Primary | ICD-10-CM

## 2021-12-27 DIAGNOSIS — I27.82 CHRONIC PULMONARY EMBOLISM WITHOUT ACUTE COR PULMONALE, UNSPECIFIED PULMONARY EMBOLISM TYPE (H): Primary | ICD-10-CM

## 2021-12-27 DIAGNOSIS — G81.10 SPASTIC HEMIPLEGIA, UNSPECIFIED ETIOLOGY, UNSPECIFIED LATERALITY (H): ICD-10-CM

## 2021-12-27 LAB
ALBUMIN SERPL-MCNC: 4.3 G/DL (ref 3.4–5)
ALP SERPL-CCNC: 80 U/L (ref 40–150)
ALT SERPL W P-5'-P-CCNC: 60 U/L (ref 0–50)
ANION GAP SERPL CALCULATED.3IONS-SCNC: 8 MMOL/L (ref 3–14)
AST SERPL W P-5'-P-CCNC: 67 U/L (ref 0–45)
BACTERIA UR CULT: NORMAL
BASOPHILS # BLD AUTO: 0.3 10E3/UL (ref 0–0.2)
BASOPHILS NFR BLD AUTO: 2 %
BILIRUB SERPL-MCNC: 3.3 MG/DL (ref 0.2–1.3)
BUN SERPL-MCNC: 9 MG/DL (ref 7–30)
CALCIUM SERPL-MCNC: 8.8 MG/DL (ref 8.5–10.1)
CHLORIDE BLD-SCNC: 111 MMOL/L (ref 94–109)
CO2 SERPL-SCNC: 18 MMOL/L (ref 20–32)
CREAT SERPL-MCNC: 0.52 MG/DL (ref 0.52–1.04)
EOSINOPHIL # BLD AUTO: 0.5 10E3/UL (ref 0–0.7)
EOSINOPHIL NFR BLD AUTO: 5 %
ERYTHROCYTE [DISTWIDTH] IN BLOOD BY AUTOMATED COUNT: 22.4 % (ref 10–15)
GFR SERPL CREATININE-BSD FRML MDRD: >90 ML/MIN/1.73M2
GLUCOSE BLD-MCNC: 94 MG/DL (ref 70–99)
HCT VFR BLD AUTO: 22.9 % (ref 35–47)
HGB BLD-MCNC: 8.1 G/DL (ref 11.7–15.7)
IMM GRANULOCYTES # BLD: 0.1 10E3/UL
IMM GRANULOCYTES NFR BLD: 1 %
INR PPP: 1.25 (ref 0.85–1.15)
LYMPHOCYTES # BLD AUTO: 2.2 10E3/UL (ref 0.8–5.3)
LYMPHOCYTES NFR BLD AUTO: 19 %
MCH RBC QN AUTO: 31.4 PG (ref 26.5–33)
MCHC RBC AUTO-ENTMCNC: 35.4 G/DL (ref 31.5–36.5)
MCV RBC AUTO: 89 FL (ref 78–100)
MONOCYTES # BLD AUTO: 0.7 10E3/UL (ref 0–1.3)
MONOCYTES NFR BLD AUTO: 6 %
NEUTROPHILS # BLD AUTO: 7.9 10E3/UL (ref 1.6–8.3)
NEUTROPHILS NFR BLD AUTO: 67 %
NRBC # BLD AUTO: 0.3 10E3/UL
NRBC BLD AUTO-RTO: 2 /100
PLATELET # BLD AUTO: 357 10E3/UL (ref 150–450)
POTASSIUM BLD-SCNC: 4.2 MMOL/L (ref 3.4–5.3)
PROT SERPL-MCNC: 8.1 G/DL (ref 6.8–8.8)
RBC # BLD AUTO: 2.58 10E6/UL (ref 3.8–5.2)
SODIUM SERPL-SCNC: 137 MMOL/L (ref 133–144)
WBC # BLD AUTO: 11.6 10E3/UL (ref 4–11)

## 2021-12-27 PROCEDURE — 258N000003 HC RX IP 258 OP 636: Performed by: PEDIATRICS

## 2021-12-27 PROCEDURE — 36591 DRAW BLOOD OFF VENOUS DEVICE: CPT

## 2021-12-27 PROCEDURE — 85025 COMPLETE CBC W/AUTO DIFF WBC: CPT

## 2021-12-27 PROCEDURE — 96376 TX/PRO/DX INJ SAME DRUG ADON: CPT

## 2021-12-27 PROCEDURE — 80053 COMPREHEN METABOLIC PANEL: CPT

## 2021-12-27 PROCEDURE — 85610 PROTHROMBIN TIME: CPT

## 2021-12-27 PROCEDURE — 96374 THER/PROPH/DIAG INJ IV PUSH: CPT

## 2021-12-27 PROCEDURE — 250N000011 HC RX IP 250 OP 636: Performed by: PEDIATRICS

## 2021-12-27 PROCEDURE — 96361 HYDRATE IV INFUSION ADD-ON: CPT

## 2021-12-27 RX ORDER — HEPARIN SODIUM (PORCINE) LOCK FLUSH IV SOLN 100 UNIT/ML 100 UNIT/ML
5 SOLUTION INTRAVENOUS EVERY 8 HOURS
Status: DISCONTINUED | OUTPATIENT
Start: 2021-12-27 | End: 2021-12-27 | Stop reason: HOSPADM

## 2021-12-27 RX ORDER — OXYCODONE HYDROCHLORIDE 15 MG/1
15 TABLET ORAL EVERY 4 HOURS PRN
Qty: 50 TABLET | Refills: 0 | Status: SHIPPED | OUTPATIENT
Start: 2021-12-27 | End: 2022-01-03

## 2021-12-27 RX ORDER — CEFDINIR 300 MG/1
300 CAPSULE ORAL 2 TIMES DAILY
Qty: 14 CAPSULE | Refills: 0 | Status: SHIPPED | OUTPATIENT
Start: 2021-12-27 | End: 2022-01-17

## 2021-12-27 RX ORDER — DIPHENHYDRAMINE HCL 25 MG
25 CAPSULE ORAL
Status: CANCELLED
Start: 2022-01-01

## 2021-12-27 RX ORDER — MORPHINE SULFATE 2 MG/ML
2 INJECTION, SOLUTION INTRAMUSCULAR; INTRAVENOUS
Status: COMPLETED | OUTPATIENT
Start: 2021-12-27 | End: 2021-12-27

## 2021-12-27 RX ORDER — HEPARIN SODIUM (PORCINE) LOCK FLUSH IV SOLN 100 UNIT/ML 100 UNIT/ML
5 SOLUTION INTRAVENOUS
Status: CANCELLED | OUTPATIENT
Start: 2022-01-01

## 2021-12-27 RX ORDER — ONDANSETRON 8 MG/1
8 TABLET, ORALLY DISINTEGRATING ORAL
Status: DISCONTINUED | OUTPATIENT
Start: 2021-12-27 | End: 2021-12-27 | Stop reason: HOSPADM

## 2021-12-27 RX ORDER — MORPHINE SULFATE 2 MG/ML
2 INJECTION, SOLUTION INTRAMUSCULAR; INTRAVENOUS
Status: CANCELLED
Start: 2022-01-01

## 2021-12-27 RX ORDER — ONDANSETRON 8 MG/1
8 TABLET, FILM COATED ORAL
Status: CANCELLED
Start: 2022-01-01

## 2021-12-27 RX ORDER — HEPARIN SODIUM (PORCINE) LOCK FLUSH IV SOLN 100 UNIT/ML 100 UNIT/ML
5 SOLUTION INTRAVENOUS
Status: DISCONTINUED | OUTPATIENT
Start: 2021-12-27 | End: 2021-12-27 | Stop reason: HOSPADM

## 2021-12-27 RX ORDER — HEPARIN SODIUM,PORCINE 10 UNIT/ML
5 VIAL (ML) INTRAVENOUS
Status: CANCELLED | OUTPATIENT
Start: 2022-01-01

## 2021-12-27 RX ORDER — HEPARIN SODIUM,PORCINE 10 UNIT/ML
5 VIAL (ML) INTRAVENOUS
Status: DISCONTINUED | OUTPATIENT
Start: 2021-12-27 | End: 2021-12-27 | Stop reason: HOSPADM

## 2021-12-27 RX ORDER — DIPHENHYDRAMINE HCL 25 MG
25 CAPSULE ORAL
Status: DISCONTINUED | OUTPATIENT
Start: 2021-12-27 | End: 2021-12-27 | Stop reason: HOSPADM

## 2021-12-27 RX ADMIN — MORPHINE SULFATE 2 MG: 2 INJECTION, SOLUTION INTRAMUSCULAR; INTRAVENOUS at 10:13

## 2021-12-27 RX ADMIN — MORPHINE SULFATE 2 MG: 2 INJECTION, SOLUTION INTRAMUSCULAR; INTRAVENOUS at 12:17

## 2021-12-27 RX ADMIN — MORPHINE SULFATE 2 MG: 2 INJECTION, SOLUTION INTRAMUSCULAR; INTRAVENOUS at 11:20

## 2021-12-27 RX ADMIN — DEXTROSE AND SODIUM CHLORIDE 500 ML: 5; 450 INJECTION, SOLUTION INTRAVENOUS at 10:10

## 2021-12-27 RX ADMIN — Medication 5 ML: at 12:26

## 2021-12-27 RX ADMIN — Medication 5 ML: at 10:00

## 2021-12-27 ASSESSMENT — PAIN SCALES - GENERAL: PAINLEVEL: EXTREME PAIN (9)

## 2021-12-27 NOTE — TELEPHONE ENCOUNTER
Medication: Cefdinir 300 mg  Last prescribing provider: Pedro Ryan MD, ED provider    Last clinic visit date: 12/20/21    Any missed appointments or no-shows since last clinic visit?: No    Recommendations for requested medication (if none, N/A): NA    Next clinic visit date: Not scheduled with Dr. Duncan, but has apt with Dr. Pierce on 1/12/2022.    Any other pertinent information (if none, N/A): Jennifer was in ED on 12/25. Was told she had a UTI and was supposed to hand carry abx script to pharmacy but forgot to do that. Is wondering if you will sign for it?    Pended and Routed to Provider.

## 2021-12-27 NOTE — PROGRESS NOTES
This is a recent snapshot of the patient's [facility] medical record.  For current drug dose and complete information and questions, call [phone] or In Basket pool, [facility] home infusion ([id number])  CSN Number:  [id number]

## 2021-12-27 NOTE — PROGRESS NOTES
ANTICOAGULATION MANAGEMENT     Jennifer Cervantes 21 year old female is on warfarin with subtherapeutic INR result. (Goal INR 2.0-3.0)    Recent labs: (last 7 days)     12/27/21  0958   INR 1.25*       ASSESSMENT     Source(s): Chart Review and Patient/Caregiver Call       Warfarin doses taken: Less warfarin taken than planned which may be affecting INR and Missed dose(s) may be affecting INR-Warfarin refilled 12/24/21-patient reports that she was unable to  medication that day as the pharmacy closed early-she was able to  medication today.    Diet: No new diet changes identified    New illness, injury, or hospitalization: Yes: ED visit 12/25/21    Medication/supplement changes: starting Morphine for sickle cell pain    Signs or symptoms of bleeding or clotting: No    Previous INR: Subtherapeutic    Additional findings: None     PLAN     Recommended plan for temporary change(s) and ongoing change(s) affecting INR     Dosing Instructions: Booster dose then continue your current warfarin dose with next INR in 4 days       Summary  As of 12/27/2021    Full warfarin instructions:  12/27: 20 mg; Otherwise 12.5 mg every day   Next INR check:  12/31/2021             Telephone call with Jennifer who verbalizes understanding and agrees to plan and who agrees to plan and repeated back plan correctly    Recheck INR at Infusion on 12/31/21    Education provided: Goal range and significance of current result, Importance of taking warfarin as instructed and Contact [phone] with any changes, questions or concerns.     Plan made per [facility] anticoagulation protocol    PRINCESS MORENO RN  Anticoagulation Clinic  12/27/2021    _______________________________________________________________________     Anticoagulation Episode Summary     Current INR goal:  2.0-3.0   TTR:  0.0 % (3.7 wk)   Target end date:  Indefinite   Send INR reminders to:  [facility]    Indications    Chronic pulmonary embolism without acute cor  pulmonale  unspecified pulmonary embolism type (H) [I27.82]           Comments:  Infusion center [phone]  [facility] [phone]         Anticoagulation Care Providers     Provider Role Specialty Phone number    Eric Duncan MD Referring Pediatric Hematology-Oncology [phone]

## 2021-12-27 NOTE — TELEPHONE ENCOUNTER
[facility] Triage    Incoming call: IVF and pain meds    Pt called in to triage requesting IVF pain meds for lower back  pain rated 9/10 x 2 days. Stated last took Oxy and MS Contin this morning without much relief. Denied any fevers, chills, cough, sob, chest pain or other symptoms.  Assess for confusion, numbness, paralysis, vomiting, headache, vision issues, difficulty walking and/or talking. Pt's last infusion was 12/25, last clinic visit 12/20 [facility] with no  follow-up on planned. Patient states they do not have own ride and it will take 60 long to get to cancer clinic. Pt denied being out of home medications and needing any refills today. Pt qualifies for sickle cell standing order protocol.    If pain continues and we cannot get her in, advised ED.  She is aware of the guidelines we use.    *patient asked when she can have her next refill - she states she has 2-3 days left (she did not have the bottle with her - guessed) Refill and encounter sent.    Teams and Text Amanda Galvez infusion and labs.    Patient has labs at 930 and IVF and pain meds at 1000.    Routing to Dr. Duncan and Amanda Roth

## 2021-12-27 NOTE — TELEPHONE ENCOUNTER
ANTICOAGULATION  MANAGEMENT: Discharge Review    Jennifer Cervantes chart reviewed for anticoagulation continuity of care    Emergency room visit on 12/25/21 for sickle cell crisis.    Discharge disposition: Home    Results:    Recent labs: (last 7 days)     12/20/21  1406 12/24/21  0941   INR 1.27* 1.26*     Anticoagulation inpatient management:     not applicable     Anticoagulation discharge instructions:     Warfarin dosing: home regimen continued   Bridging: No   INR goal change: No      Medication changes affecting anticoagulation: Yes: Patient was started on Cefdinir    Additional factors affecting anticoagulation: Yes: Patient was diagnosed with a UTI    Plan     No adjustment to anticoagulation plan needed    Patient not contacted    No adjustment to Anticoagulation Calendar was required    Gita Khan RN

## 2021-12-27 NOTE — TELEPHONE ENCOUNTER
[facility] Triage    Refill Request:     Refill Request    Date of most recent appointment: 12/20 [facility]  Next upcoming appointment:   None on the books    Medication requested:  Oxy  Quantity:  50  Last fill date:  12/18  Person requesting refill:  Jennifer  Notes:      Prescribing provider(s):  [facility]    How are you taking: taking every 6 hours      Are you having any side effects: NO    How many do you have left 12-18 patient unsure but guessing    Is this managing your pain: Yes    What pharmacy [address] [place] [address]    Routing to Dr. Duncan

## 2021-12-27 NOTE — NURSING NOTE
Chief Complaint   Patient presents with     Port Draw     power needle,.heparin locked, vitals checked     Elena Mcghee RN on 12/27/2021 at 10:01 AM

## 2021-12-27 NOTE — PROGRESS NOTES
Social Work Note: Telephone Call  Oncology Clinic     Data/Intervention:  Patient Name:  Jennifer Cervantes  /Age: 2000, 21 years old     Call From: [facility]        Reason for Call:  Transportation     Assessment:   called [employer] ([phone]) to arrange ride. [employer] arranged  for patient from home with a will call return ride. Patient will need to call when ready for return ride home.      Plan:  Patient is aware of the transportation plan.  available to assist with any other needs.      CARLOS Chavez,[facility]  Hematology/Oncology Social Worker  Phone:[phone] Pager: [phone]

## 2021-12-27 NOTE — PROGRESS NOTES
Nursing Note  Jennifer Cervantes presents [facility] for:   Chief Complaint   Patient presents with     Port Draw     power needle,.heparin locked, vitals checked     Infusion     IVF/pain meds     During today's [facility] appointment, orders from Dr. Duncan were completed.  Frequency: as needed    Progress note:  Patient identification verified by name and date of birth.  Assessment completed.  Vitals recorded in Doc Flowsheets.  Patient was provided with education regarding medication/procedure and possible side effects.  Patient verbalized understanding.     present during visit today: Not Applicable.    Treatment Conditions: Non-applicable.    Premedications: were not ordered.    Drug Waste Record: No    Infusion length and rate:  infusion given over approximately 2 hours    Labs: were not ordered for this appointment.    Vascular access: port accessed today in [facility].    Is the next appt scheduled? NA, prn  Asymptomatic COVID test completed? no    Post Infusion Assessment:  Patient tolerated infusion without incident.     Discharge Plan:   Follow up plan of care with: ordering provider as scheduled.  Discharge instructions were reviewed with patient.  Patient/representative verbalized understanding of discharge instructions and all questions answered.  Patient discharged from [facility] in stable condition.    Yina Ford RN    Administrations This Visit     dextrose 5% and 0.45% NaCl BOLUS     Admin Date  12/27/2021 Action  New Bag Dose  500 mL Rate  250 mL/hr Route  Intravenous Administered By  Yina Ford, JOE          heparin 100 UNIT/ML injection 5 mL     Admin Date  12/27/2021 Action  Given Dose  5 mL Route  Intracatheter Administered By  Elena Mcghee RN           Admin Date  12/27/2021 Action  Given Dose  5 mL Route  Intracatheter Administered By  Yina Ford, JOE          morphine  (PF) injection 2 mg     Admin Date  12/27/2021 Action  Given Dose  2 mg Route  Intravenous Administered By  Yina Ford, JOE           Admin Date  12/27/2021 Action  Given Dose  2 mg Route  Intravenous Administered By  Yina Ford, JOE           Admin Date  12/27/2021 Action  Given Dose  2 mg Route  Intravenous Administered By  Yina Ford, RN                BP (!) 136/90   Pulse 111   Temp 98.1  F (36.7  C)   Resp 18   Wt 76.5 kg (168 lb 10.4 oz)   SpO2 94%   BMI 28.95 kg/m

## 2021-12-28 ENCOUNTER — INFUSION THERAPY VISIT (OUTPATIENT)
Dept: TRANSPLANT | Facility: CLINIC | Age: 21
End: 2021-12-28
Attending: PEDIATRICS
Payer: COMMERCIAL

## 2021-12-28 ENCOUNTER — PATIENT OUTREACH (OUTPATIENT)
Dept: CARE COORDINATION | Facility: CLINIC | Age: 21
End: 2021-12-28
Payer: COMMERCIAL

## 2021-12-28 ENCOUNTER — TELEPHONE (OUTPATIENT)
Dept: ONCOLOGY | Facility: CLINIC | Age: 21
End: 2021-12-28
Payer: COMMERCIAL

## 2021-12-28 VITALS
SYSTOLIC BLOOD PRESSURE: 117 MMHG | HEART RATE: 108 BPM | DIASTOLIC BLOOD PRESSURE: 74 MMHG | OXYGEN SATURATION: 94 % | RESPIRATION RATE: 16 BRPM | TEMPERATURE: 98.6 F

## 2021-12-28 DIAGNOSIS — G81.10 SPASTIC HEMIPLEGIA, UNSPECIFIED ETIOLOGY, UNSPECIFIED LATERALITY (H): Primary | ICD-10-CM

## 2021-12-28 DIAGNOSIS — D57.00 SICKLE CELL PAIN CRISIS (H): ICD-10-CM

## 2021-12-28 PROCEDURE — 96374 THER/PROPH/DIAG INJ IV PUSH: CPT

## 2021-12-28 PROCEDURE — 96376 TX/PRO/DX INJ SAME DRUG ADON: CPT

## 2021-12-28 PROCEDURE — 258N000003 HC RX IP 258 OP 636: Performed by: PEDIATRICS

## 2021-12-28 PROCEDURE — 999N000130 HC STATISTIC PORT-A-CATH ACCESS/FLUSHING

## 2021-12-28 PROCEDURE — 96361 HYDRATE IV INFUSION ADD-ON: CPT

## 2021-12-28 PROCEDURE — 250N000011 HC RX IP 250 OP 636: Performed by: PEDIATRICS

## 2021-12-28 RX ORDER — MORPHINE SULFATE 2 MG/ML
2 INJECTION, SOLUTION INTRAMUSCULAR; INTRAVENOUS
Status: CANCELLED
Start: 2022-01-01

## 2021-12-28 RX ORDER — ONDANSETRON 8 MG/1
8 TABLET, FILM COATED ORAL
Status: CANCELLED
Start: 2022-01-01

## 2021-12-28 RX ORDER — HEPARIN SODIUM,PORCINE 10 UNIT/ML
5 VIAL (ML) INTRAVENOUS
Status: CANCELLED | OUTPATIENT
Start: 2022-01-01

## 2021-12-28 RX ORDER — HEPARIN SODIUM (PORCINE) LOCK FLUSH IV SOLN 100 UNIT/ML 100 UNIT/ML
5 SOLUTION INTRAVENOUS
Status: DISCONTINUED | OUTPATIENT
Start: 2021-12-28 | End: 2021-12-28 | Stop reason: HOSPADM

## 2021-12-28 RX ORDER — MORPHINE SULFATE 2 MG/ML
2 INJECTION, SOLUTION INTRAMUSCULAR; INTRAVENOUS
Status: COMPLETED | OUTPATIENT
Start: 2021-12-28 | End: 2021-12-28

## 2021-12-28 RX ORDER — DIPHENHYDRAMINE HCL 25 MG
25 CAPSULE ORAL
Status: CANCELLED
Start: 2022-01-01

## 2021-12-28 RX ORDER — HEPARIN SODIUM (PORCINE) LOCK FLUSH IV SOLN 100 UNIT/ML 100 UNIT/ML
5 SOLUTION INTRAVENOUS
Status: CANCELLED | OUTPATIENT
Start: 2022-01-01

## 2021-12-28 RX ADMIN — Medication 5 ML: at 15:15

## 2021-12-28 RX ADMIN — MORPHINE SULFATE 2 MG: 2 INJECTION, SOLUTION INTRAMUSCULAR; INTRAVENOUS at 15:10

## 2021-12-28 RX ADMIN — MORPHINE SULFATE 2 MG: 2 INJECTION, SOLUTION INTRAMUSCULAR; INTRAVENOUS at 14:06

## 2021-12-28 RX ADMIN — DEXTROSE AND SODIUM CHLORIDE 500 ML: 5; 450 INJECTION, SOLUTION INTRAVENOUS at 13:08

## 2021-12-28 RX ADMIN — MORPHINE SULFATE 2 MG: 2 INJECTION, SOLUTION INTRAMUSCULAR; INTRAVENOUS at 13:09

## 2021-12-28 ASSESSMENT — PAIN SCALES - GENERAL: PAINLEVEL: EXTREME PAIN (9)

## 2021-12-28 NOTE — PROGRESS NOTES
This is a recent snapshot of the patient's [facility] medical record.  For current drug dose and complete information and questions, call [phone] or In Basket pool, fv home infusion ([id number])  CSN Number:  [id number]

## 2021-12-28 NOTE — NURSING NOTE
"  Reviewed Med list    Completed AVS    testing orders placed    Marked chart \"Ready for Checkout\"    msg sent to Brandy Álvarez for follow up on testing.   Jessi Fabian RN on 12/28/2021 at 12:37 PM      "

## 2021-12-28 NOTE — PROGRESS NOTES
Infusion Nursing Note:  Jennifer Cervantes presents today for add-on infusion.    Patient seen by provider today: No   present during visit today: Not Applicable.    Note: No labs ordered for today.  Patient assessment was completed, see flowsheet for details..      Intravenous Access:  Implanted Port.    Treatment Conditions:  Patient received 500 mls D5 1/2NS over two hours.  She received three doses of 2 mg IV push Morphine, each spaced one hour apart.  She reported partial relief from her sickle cell pain.  She was satisfied with today's treatment.      Post Infusion Assessment:  Patient tolerated infusion without incident.       Discharge Plan:   Patient discharged in stable condition accompanied by: self.  Patient has transportation home.      ELINA WINTER RN

## 2021-12-28 NOTE — NURSING NOTE
"Oncology Rooming Note    December 28, 2021 1:34 PM   Jennifer Cervantes is a 21 year old female who presents for:    Chief Complaint   Patient presents with     Infusion     add-on infusion for sickle cell crisis     Initial Vitals: /74   Pulse 108   Temp 98.6  F (37  C) (Oral)   Resp 16   SpO2 94%  Estimated body mass index is 28.95 kg/m  as calculated from the following:    Height as of 12/25/21: 1.626 m (5' 4\").    Weight as of 12/27/21: 76.5 kg (168 lb 10.4 oz). There is no height or weight on file to calculate BSA.  Extreme Pain (9) Comment: Data Unavailable   No LMP recorded. Patient has had an injection.  Allergies reviewed: Yes  Medications reviewed: Yes    Medications: Medication refills not needed today.  Pharmacy name entered into [facility]:    [facility] - [place], [place] - [address] [facility] - [place], [place] - [phone]    Clinical concerns: Patient denies fevers/N/V/D/respiratory symptoms.  She reports have 9/10 generalized sickle cell pain.    ELINA WINTER RN              "

## 2021-12-28 NOTE — TELEPHONE ENCOUNTER
Pt called in to triage requesting IVF pain meds for lower back pain rated 8/10 x many days. Stated last took prn oxycodone and MS contin this morning at 6 a.m. without relief. Denied any fevers, chills, cough, sob, chest pain, numbness or tingling or other symptoms not typical of sickle cell pain.   Pt's last infusion was 12/27, last clinic visit 12/20/21 with follow-up on 1/12/22 with Dr. Pierce, no apt scheduled with Dr. Duncan yet.   Patient states she does not have own ride and it will take 25 minutes to get to cancer clinic.     Pt denied being out of home medications and needing any refills today. Refills were sent yesterday to [address] [place] [address] for Cefdinir and Oxycodone and pt will  today after infusion.    Pt qualifies for sickle cell standing order protocol. Second day in a row for infusion.    If you do not hear from the infusion center by 2pm then you will not be able to get in for an infusion today.     Added to Wait list.    BMT can offer 1300 apt.  Called Jennifer and she confirmed she can ome to clinic today at that time.  IB sent to scheduling.  Notified BMT charge nurse that pt confirmed she could come to apt.  Notified SW to coordinate transportation.    Routed to Dr. Duncan and Amanda, JOECC

## 2021-12-28 NOTE — PATIENT INSTRUCTIONS
Medication Changes:   -none  Patient Instructions:  1. Continue staying active and eat a heart healthy diet.    2. Please keep current list of medications with you at all times.    3. Remember to weigh yourself daily after voiding and before you consume any food or beverages and log the numbers.  If you have gained 2 pounds overnight or 5 pounds in a week contact us immediately for medication adjustments or further instructions.    4. **Please call us immediately if you have any syncope (fainting or passing out), chest pain, edema (swelling or weight gain), or decline in your functional status (general decline in how you are feeling).    5. Patients on Remodulin (treprostinil) or Veletri (epoprostenol): Please make sure that you have your backup pump and supplies with you at all times, your mixing instructions, and contact information for your specialty pharmacy.    Follow up Appointment Information:  -Echo with bubble & VQ scan    Check-In  Time Check-In Location Estimated Length Procedure   Name         60 minutes Echocardiogram (Echo)**   Procedure Preparations & Instructions     This is a non-invasive procedure and does NOT require any preparation       Check-In  Time Check-In Location Estimated Length Procedure   Name        GOLD   waiting room 60 minutes VQ/Lung Perfusion Scan**   Procedure Preparations & Instructions     This is a non-invasive procedure and does NOT require any preparation         We are located on the third floor of the Clinic [facility] on the [facility].  Our address is     [address] [place] [address] [place] [address] [place] [address]      Thank you for allowing us to be a part of your care here at the [facility]    If you have questions or concerns please contact us at:    Brandy Álvarez, RN, BSN PHN  Henrietta Bingham (Schedule,Prior Auth)  Nurse Coordinator     Clinic   Pulmonary  Hypertension   Pulmonary Hypertension  [facility]  (Phone)[phone]   (Phone) [phone]        (Fax)[phone]                ** Please note that you will NOT receive a reminder call regarding your scheduled testing, reminder calls are for provider appointments only.  If you are scheduled for testing within the [facility] system you may receive a call regarding pre-registration for billing purposes only.**

## 2021-12-28 NOTE — PROGRESS NOTES
Social Work Note: Telephone Call  Oncology Clinic     Data/Intervention:  Patient Name:  Jennifer Cervantes   /Age: 20, 21 years old     Call From: [facility]        Reason for Call:  Transportation     Assessment:   called [employer] to arrange ride through patient's insurance. [employer] arranged  for patient from home with [employer] ([phone]).  Patient will need to call when ready for return ride home. Patient encouraged to reach out to  if there are any issues with the ride.      Plan:  Patient is aware of the transportation plan.  available to assist with any other needs.      CARLOS Chavez,[facility]  Hematology/Oncology Social Worker  Phone:[phone] Pager: [phone]

## 2021-12-28 NOTE — LETTER
12/28/2021         RE: Jennifer Cervantes  [address]  [place] [address]        Dear Colleague,    Thank you for referring your patient, Jennifer Cervantes, to the [facility] [place]. Please see a copy of my visit note below.    Infusion Nursing Note:  Jennifer Cervantes presents today for add-on infusion.    Patient seen by provider today: No   present during visit today: Not Applicable.    Note: No labs ordered for today.  Patient assessment was completed, see flowsheet for details..      Intravenous Access:  Implanted Port.    Treatment Conditions:  Patient received 500 mls D5 1/2NS over two hours.  She received three doses of 2 mg IV push Morphine, each spaced one hour apart.  She reported partial relief from her sickle cell pain.  She was satisfied with today's treatment.      Post Infusion Assessment:  Patient tolerated infusion without incident.       Discharge Plan:   Patient discharged in stable condition accompanied by: self.  Patient has transportation home.      ELINA WINTER RN                          Again, thank you for allowing me to participate in the care of your patient.        Sincerely,        [facility] [place]

## 2021-12-28 NOTE — LETTER
Date:January 5, 2022      Provider requested that no letter be sent. Do not send.       [facility]

## 2021-12-29 ENCOUNTER — INFUSION THERAPY VISIT (OUTPATIENT)
Dept: ONCOLOGY | Facility: CLINIC | Age: 21
End: 2021-12-29
Attending: PEDIATRICS
Payer: COMMERCIAL

## 2021-12-29 ENCOUNTER — NURSE TRIAGE (OUTPATIENT)
Dept: ONCOLOGY | Facility: CLINIC | Age: 21
End: 2021-12-29
Payer: COMMERCIAL

## 2021-12-29 ENCOUNTER — HOME INFUSION (PRE-WILLOW HOME INFUSION) (OUTPATIENT)
Dept: PHARMACY | Facility: CLINIC | Age: 21
End: 2021-12-29
Payer: COMMERCIAL

## 2021-12-29 ENCOUNTER — PATIENT OUTREACH (OUTPATIENT)
Dept: CARE COORDINATION | Facility: CLINIC | Age: 21
End: 2021-12-29
Payer: COMMERCIAL

## 2021-12-29 VITALS
RESPIRATION RATE: 14 BRPM | SYSTOLIC BLOOD PRESSURE: 121 MMHG | DIASTOLIC BLOOD PRESSURE: 71 MMHG | TEMPERATURE: 98.4 F | HEART RATE: 108 BPM | OXYGEN SATURATION: 94 %

## 2021-12-29 DIAGNOSIS — G81.10 SPASTIC HEMIPLEGIA, UNSPECIFIED ETIOLOGY, UNSPECIFIED LATERALITY (H): Primary | ICD-10-CM

## 2021-12-29 DIAGNOSIS — D57.00 SICKLE CELL PAIN CRISIS (H): ICD-10-CM

## 2021-12-29 PROCEDURE — 250N000011 HC RX IP 250 OP 636: Performed by: PEDIATRICS

## 2021-12-29 PROCEDURE — 96376 TX/PRO/DX INJ SAME DRUG ADON: CPT

## 2021-12-29 PROCEDURE — 258N000003 HC RX IP 258 OP 636: Performed by: PEDIATRICS

## 2021-12-29 PROCEDURE — 96361 HYDRATE IV INFUSION ADD-ON: CPT

## 2021-12-29 PROCEDURE — 96374 THER/PROPH/DIAG INJ IV PUSH: CPT

## 2021-12-29 RX ORDER — HEPARIN SODIUM (PORCINE) LOCK FLUSH IV SOLN 100 UNIT/ML 100 UNIT/ML
5 SOLUTION INTRAVENOUS
Status: CANCELLED | OUTPATIENT
Start: 2022-01-01

## 2021-12-29 RX ORDER — MORPHINE SULFATE 2 MG/ML
2 INJECTION, SOLUTION INTRAMUSCULAR; INTRAVENOUS
Status: CANCELLED
Start: 2022-01-01

## 2021-12-29 RX ORDER — ONDANSETRON 8 MG/1
8 TABLET, FILM COATED ORAL
Status: CANCELLED
Start: 2022-01-01

## 2021-12-29 RX ORDER — HEPARIN SODIUM,PORCINE 10 UNIT/ML
5 VIAL (ML) INTRAVENOUS
Status: CANCELLED | OUTPATIENT
Start: 2022-01-01

## 2021-12-29 RX ORDER — HEPARIN SODIUM,PORCINE 10 UNIT/ML
5 VIAL (ML) INTRAVENOUS
Status: DISCONTINUED | OUTPATIENT
Start: 2021-12-29 | End: 2021-12-29 | Stop reason: HOSPADM

## 2021-12-29 RX ORDER — DIPHENHYDRAMINE HCL 25 MG
25 CAPSULE ORAL
Status: DISCONTINUED | OUTPATIENT
Start: 2021-12-29 | End: 2021-12-29 | Stop reason: HOSPADM

## 2021-12-29 RX ORDER — MORPHINE SULFATE 2 MG/ML
2 INJECTION, SOLUTION INTRAMUSCULAR; INTRAVENOUS
Status: DISCONTINUED | OUTPATIENT
Start: 2021-12-29 | End: 2021-12-29 | Stop reason: HOSPADM

## 2021-12-29 RX ORDER — HEPARIN SODIUM (PORCINE) LOCK FLUSH IV SOLN 100 UNIT/ML 100 UNIT/ML
5 SOLUTION INTRAVENOUS
Status: DISCONTINUED | OUTPATIENT
Start: 2021-12-29 | End: 2021-12-29 | Stop reason: HOSPADM

## 2021-12-29 RX ORDER — DIPHENHYDRAMINE HCL 25 MG
25 CAPSULE ORAL
Status: CANCELLED
Start: 2022-01-01

## 2021-12-29 RX ORDER — ONDANSETRON 8 MG/1
8 TABLET, ORALLY DISINTEGRATING ORAL
Status: DISCONTINUED | OUTPATIENT
Start: 2021-12-29 | End: 2021-12-29 | Stop reason: HOSPADM

## 2021-12-29 RX ADMIN — MORPHINE SULFATE 2 MG: 2 INJECTION, SOLUTION INTRAMUSCULAR; INTRAVENOUS at 11:49

## 2021-12-29 RX ADMIN — MORPHINE SULFATE 2 MG: 2 INJECTION, SOLUTION INTRAMUSCULAR; INTRAVENOUS at 10:07

## 2021-12-29 RX ADMIN — MORPHINE SULFATE 2 MG: 2 INJECTION, SOLUTION INTRAMUSCULAR; INTRAVENOUS at 10:56

## 2021-12-29 RX ADMIN — DEXTROSE AND SODIUM CHLORIDE 500 ML: 5; 450 INJECTION, SOLUTION INTRAVENOUS at 10:07

## 2021-12-29 RX ADMIN — Medication 5 ML: at 11:50

## 2021-12-29 ASSESSMENT — PAIN SCALES - GENERAL: PAINLEVEL: EXTREME PAIN (9)

## 2021-12-29 NOTE — PROGRESS NOTES
SW received referral to assist with transportation coordination. SW called [employer] and arranged ride for patient. SW informed patient and clinic of ride information, round trip ride with will call return. Patient will call [phone] for return ride when appointment is complete. SW will remain available as needed.         Corry GONZALEZ, [facility]  - Oncology  Phone : [phone]  Pager: [phone]

## 2021-12-29 NOTE — PROGRESS NOTES
"Infusion Nursing Note:  Jennifer Cervantes presents today for IVF- Pain Meds.    Patient seen by provider today: No   present during visit today: Not Applicable.    Note:   Constitutional: Negative for activity change and fever.   HENT: Negative for congestion, drooling, sinus pressure and sinus pain.    Eyes: Negative for redness and visual disturbance.   Respiratory: Negative for cough and shortness of breath.    Cardiovascular: Negative for chest pain and leg swelling. HX of DVT/PE  Gastrointestinal: Negative for abdominal pain, nausea and vomiting.   Genitourinary: Negative for difficulty urinating and enuresis.   Musculoskeletal: SCC pain \"all over\"  Skin: Negative for color change, rash and wound.   Neurological: Negative for dizziness, seizures, syncope, light-headedness, numbness and headaches.   Hematological: Does not bruise/bleed easily.   Psychiatric/Behavioral: Negative for confusion and hallucinations.     Pt reporting 6/10 after all pain medications and IVF given.  Pt feels ok to go home and declines further interventions.  Jr not covered by insurance so currently not an option for pt.    Intravenous Access:  Implanted Port.    Treatment Conditions:  Not Applicable.      Post Infusion Assessment:  Patient tolerated infusion without incident.  Blood return noted pre and post infusion.  Site patent and intact, free from redness, edema or discomfort.  No evidence of extravasations.  Access discontinued per protocol.       Discharge Plan:   Prescription refills given for MS Contin.  Discharge instructions reviewed with: Patient.  Patient and/or family verbalized understanding of discharge instructions and all questions answered.  Copy of AVS reviewed with patient and/or family.  IB to MAURISIO Bain to work on follow up appts. Patient discharged in stable condition accompanied by: self.  Departure Mode: Ambulatory.    Patricia Coronado RN        "

## 2021-12-29 NOTE — TELEPHONE ENCOUNTER
[facility] Triage    Incoming call: Sickle Cell    Pt called in to triage requesting IVF pain meds for everywhere pain rated 10/10 x unsure days. Stated last took Oxy and MSContin this morning without much relief. Denied any fevers, chills, cough, sob, chest pain or other symptoms.  Assess for confusion, numbness, paralysis, vomiting, headache, vision issues, difficulty walking and/or talking. Pt's last infusion was 12/27, last clinic visit 12/20 [facility] with no  follow-up on schedule. Patient states they do not have own ride and it will take 60 long to get to cancer clinic. Pt denied being out of home medications and needing any refills today. Pt does  qualify for sickle cell standing order protocol.  Patient can have 3 consecutive IVF and pain meds without contacting provider.    Teams/Text Amanda Roth 709 am to verify    Jennifer is scheduled for IVF and pain meds with [facility] today at 1000.  It is ok if she arrives early.    Routing to [facility] and Amanda Roth

## 2021-12-30 ENCOUNTER — PATIENT OUTREACH (OUTPATIENT)
Dept: CARE COORDINATION | Facility: CLINIC | Age: 21
End: 2021-12-30
Payer: COMMERCIAL

## 2021-12-30 ENCOUNTER — NURSE TRIAGE (OUTPATIENT)
Dept: ONCOLOGY | Facility: CLINIC | Age: 21
End: 2021-12-30
Payer: COMMERCIAL

## 2021-12-30 ENCOUNTER — INFUSION THERAPY VISIT (OUTPATIENT)
Dept: INFUSION THERAPY | Facility: CLINIC | Age: 21
End: 2021-12-30
Attending: PEDIATRICS
Payer: COMMERCIAL

## 2021-12-30 VITALS
HEART RATE: 112 BPM | SYSTOLIC BLOOD PRESSURE: 138 MMHG | TEMPERATURE: 98.2 F | OXYGEN SATURATION: 92 % | RESPIRATION RATE: 16 BRPM | DIASTOLIC BLOOD PRESSURE: 77 MMHG

## 2021-12-30 DIAGNOSIS — G81.10 SPASTIC HEMIPLEGIA, UNSPECIFIED ETIOLOGY, UNSPECIFIED LATERALITY (H): Primary | ICD-10-CM

## 2021-12-30 DIAGNOSIS — D57.00 SICKLE CELL PAIN CRISIS (H): ICD-10-CM

## 2021-12-30 PROCEDURE — 96361 HYDRATE IV INFUSION ADD-ON: CPT

## 2021-12-30 PROCEDURE — 258N000003 HC RX IP 258 OP 636: Performed by: PEDIATRICS

## 2021-12-30 PROCEDURE — 96374 THER/PROPH/DIAG INJ IV PUSH: CPT

## 2021-12-30 PROCEDURE — 96376 TX/PRO/DX INJ SAME DRUG ADON: CPT

## 2021-12-30 PROCEDURE — 250N000011 HC RX IP 250 OP 636: Performed by: PEDIATRICS

## 2021-12-30 RX ORDER — ONDANSETRON 8 MG/1
8 TABLET, FILM COATED ORAL
Status: DISCONTINUED | OUTPATIENT
Start: 2021-12-30 | End: 2021-12-30 | Stop reason: CLARIF

## 2021-12-30 RX ORDER — HEPARIN SODIUM,PORCINE 10 UNIT/ML
5 VIAL (ML) INTRAVENOUS
Status: CANCELLED | OUTPATIENT
Start: 2022-01-01

## 2021-12-30 RX ORDER — ONDANSETRON 8 MG/1
8 TABLET, FILM COATED ORAL
Status: CANCELLED
Start: 2022-01-01

## 2021-12-30 RX ORDER — MORPHINE SULFATE 2 MG/ML
2 INJECTION, SOLUTION INTRAMUSCULAR; INTRAVENOUS
Status: CANCELLED
Start: 2022-01-01

## 2021-12-30 RX ORDER — DIPHENHYDRAMINE HCL 25 MG
25 CAPSULE ORAL
Status: CANCELLED
Start: 2022-01-01

## 2021-12-30 RX ORDER — HEPARIN SODIUM (PORCINE) LOCK FLUSH IV SOLN 100 UNIT/ML 100 UNIT/ML
5 SOLUTION INTRAVENOUS
Status: DISCONTINUED | OUTPATIENT
Start: 2021-12-30 | End: 2021-12-30 | Stop reason: HOSPADM

## 2021-12-30 RX ORDER — HEPARIN SODIUM (PORCINE) LOCK FLUSH IV SOLN 100 UNIT/ML 100 UNIT/ML
5 SOLUTION INTRAVENOUS
Status: CANCELLED | OUTPATIENT
Start: 2022-01-01

## 2021-12-30 RX ORDER — DIPHENHYDRAMINE HCL 25 MG
25 CAPSULE ORAL
Status: DISCONTINUED | OUTPATIENT
Start: 2021-12-30 | End: 2021-12-30 | Stop reason: HOSPADM

## 2021-12-30 RX ORDER — MORPHINE SULFATE 2 MG/ML
2 INJECTION, SOLUTION INTRAMUSCULAR; INTRAVENOUS
Status: DISCONTINUED | OUTPATIENT
Start: 2021-12-30 | End: 2021-12-30 | Stop reason: HOSPADM

## 2021-12-30 RX ORDER — ONDANSETRON 8 MG/1
8 TABLET, ORALLY DISINTEGRATING ORAL
Status: DISCONTINUED | OUTPATIENT
Start: 2021-12-30 | End: 2021-12-30 | Stop reason: HOSPADM

## 2021-12-30 RX ADMIN — MORPHINE SULFATE 2 MG: 2 INJECTION, SOLUTION INTRAMUSCULAR; INTRAVENOUS at 11:56

## 2021-12-30 RX ADMIN — MORPHINE SULFATE 2 MG: 2 INJECTION, SOLUTION INTRAMUSCULAR; INTRAVENOUS at 10:59

## 2021-12-30 RX ADMIN — Medication 5 ML: at 14:01

## 2021-12-30 RX ADMIN — MORPHINE SULFATE 2 MG: 2 INJECTION, SOLUTION INTRAMUSCULAR; INTRAVENOUS at 12:57

## 2021-12-30 RX ADMIN — DEXTROSE AND SODIUM CHLORIDE 500 ML: 5; 450 INJECTION, SOLUTION INTRAVENOUS at 10:56

## 2021-12-30 ASSESSMENT — PAIN SCALES - GENERAL: PAINLEVEL: SEVERE PAIN (6)

## 2021-12-30 NOTE — PATIENT INSTRUCTIONS
Dear Jennifer Cervantes    Thank you for choosing [facility] ([facility]) for your infusion.  The following information is a summary of our appointment as well as important reminders.      We look forward in seeing you on your next appointment here at [facility] ([facility]).  Please don t hesitate to call us at [phone] to reschedule any of your appointments or to speak with one of the [facility] registered nurses.  It was a pleasure taking care of you today.    Sincerely,    [facility]  [address] [place] [address]  [id number]  Phone:  [phone]

## 2021-12-30 NOTE — TELEPHONE ENCOUNTER
[facility] Triage    Pt called in to triage requesting IVF pain meds for lower  Back pain rated 9/10 x unsure days. Stated last took MS Contin and Oxy  this morning without relief. Denied any fevers, chills, cough, sob, chest pain or other symptoms.  Denies confusion, numbness, paralysis, vomiting, headache, vision issues, difficulty walking and/or talking. Pt's last infusion was yesterday last clinic visit 12/20/21 [facility] with no  follow-up. Patient states they do not have own ride and it will take 60 long to get to cancer clinic. Pt denied being out of home medications and needing any refills today. Pt does not qualify for sickle cell standing order protocol. Three in a row.    IVF and Pain 12/ 29,28,27, ED 12/25, IVF and Pain 12/24    Paging Dr. Sales 833 am  Routing to Dr. Sales and Amanda Sales ok'd IVF and pain meds no provider appt needed today.    Jennifer can make the 11 am. [employer] arranging a ride and scheduling notified.

## 2021-12-30 NOTE — PROGRESS NOTES
SW received referral to assist with transportation coordination. SW called [employer] and arranged ride for patient. SW informed patient and clinic of ride information, [employer]  at 10am with will call return. Patient will call [phone] for return ride when appointment is complete. SW will remain available as needed.         Corry GONZALEZ, [facility]  - Oncology  Phone : [phone]  Pager: [phone]

## 2021-12-30 NOTE — LETTER
12/30/2021         RE: Jennifer Cervantes  [address]  [place] [address]        Dear Colleague,    Thank you for referring your patient, Jennifer Cervantes, to the [facility]. Please see a copy of my visit note below.    Nursing Note  Jennifer Cervantes [facility] for:   Chief Complaint   Patient presents with     Infusion     IVF, pain meds     During today's [facility] appointment, orders from Dr. Duncan were completed.  Frequency: PRN    Progress note:  Patient identification verified by name and date of birth.  Assessment completed.  Vitals recorded in Doc Flowsheets.  Patient was provided with education regarding medication/procedure and possible side effects.  Patient verbalized understanding.     present during visit today: Not Applicable.    Treatment Conditions: Non-applicable.    Premedications: were not ordered. Patient declined PRN zofran and benadryl.    Drug Waste Record: No    Infusion length and rate: 500mL infusion given over approximately 2 hours at 250mL/hr. After 500mL bolus completed, patient requesting additional 500mLs to help further with pain control. Spoke to hem/onc provider on-call, Dr. Sales, and sam for patient to receive remainder 500mLs of IVF.     Labs: were not ordered for this appointment.    Vascular access: port accessed today.    Is the next appt scheduled? NA  Asymptomatic COVID test completed? no    Post Infusion Assessment:  Patient tolerated infusion without incident.      Discharge Plan:   Follow up plan of care with: ongoing infusions at [facility]., ordering provider as scheduled. and after visit summary declined by patient  Discharge instructions were reviewed with patient.  Patient/representative verbalized understanding of discharge instructions and all questions answered.  Patient discharged from  [facility] in stable condition.    Malgorzata Holcomb RN       Administrations This Visit     dextrose 5% and 0.45% NaCl BOLUS     Admin Date  12/30/2021 Action  New Bag Dose  500 mL Rate  250 mL/hr Route  Intravenous Administered By  Malgorzata Holcomb RN          heparin 100 UNIT/ML injection 5 mL     Admin Date  12/30/2021 Action  Given Dose  5 mL Route  Intracatheter Administered By  Claudia Landry RN          morphine (PF) injection 2 mg     Admin Date  12/30/2021 Action  Given Dose  2 mg Route  Intravenous Administered By  Malgorzata Holcomb RN           Admin Date  12/30/2021 Action  Given Dose  2 mg Route  Intravenous Administered By  Malgorzata Holcomb RN           Admin Date  12/30/2021 Action  Given Dose  2 mg Route  Intravenous Administered By  Malgorzata Holcomb RN                /85   Pulse 101   Temp 98.2  F (36.8  C) (Oral)   Resp 16   SpO2 92%         Again, thank you for allowing me to participate in the care of your patient.        Sincerely,        [facility]

## 2021-12-30 NOTE — PROGRESS NOTES
Nursing Note  Jennifer Cervantes presents [facility] for:   Chief Complaint   Patient presents with     Infusion     IVF, pain meds     During [facility]'s [facility] appointment, orders from Dr. Duncan were completed.  Frequency: PRN    Progress note:  Patient identification verified by name and date of birth.  Assessment completed.  Vitals recorded in Doc Flowsheets.  Patient was provided with education regarding medication/procedure and possible side effects.  Patient verbalized understanding.     present during visit today: Not Applicable.    Treatment Conditions: Non-applicable.    Premedications: were not ordered. Patient declined PRN zofran and benadryl.    Drug Waste Record: No    Infusion length and rate: 500mL infusion given over approximately 2 hours at 250mL/hr. After 500mL bolus completed, patient requesting additional 500mLs to help further with pain control. Spoke to hem/onc provider on-call, Dr. Sales, and sam for patient to receive remainder 500mLs of IVF.     Labs: were not ordered for this appointment.    Vascular access: port accessed today.    Is the next appt scheduled? NA  Asymptomatic COVID test completed? no    Post Infusion Assessment:  Patient tolerated infusion without incident.      Discharge Plan:   Follow up plan of care with: ongoing infusions at [facility]., ordering provider as scheduled. and after visit summary declined by patient  Discharge instructions were reviewed with patient.  Patient/representative verbalized understanding of discharge instructions and all questions answered.  Patient discharged [facility] in stable condition.    Malgorzata Glass RN       Administrations This Visit     dextrose 5% and 0.45% NaCl BOLUS     Admin Date  12/30/2021 Action  New Bag Dose  500 mL Rate  250 mL/hr Route  Intravenous Administered By  Zhang  JOE Mcgarry          heparin 100 UNIT/ML injection 5 mL     Admin Date  12/30/2021 Action  Given Dose  5 mL Route  Intracatheter Administered By  Claudia Landry RN          morphine (PF) injection 2 mg     Admin Date  12/30/2021 Action  Given Dose  2 mg Route  Intravenous Administered By  Malgorzata Holcomb RN           Admin Date  12/30/2021 Action  Given Dose  2 mg Route  Intravenous Administered By  Malgorzata Holcomb RN           Admin Date  12/30/2021 Action  Given Dose  2 mg Route  Intravenous Administered By  Malgorzata Holcomb RN                /85   Pulse 101   Temp 98.2  F (36.8  C) (Oral)   Resp 16   SpO2 92%

## 2021-12-30 NOTE — LETTER
Date:January 5, 2022      Provider requested that no letter be sent. Do not send.       [facility]

## 2021-12-31 ENCOUNTER — HOSPITAL ENCOUNTER (EMERGENCY)
Facility: CLINIC | Age: 21
Discharge: HOME | End: 2021-12-31
Attending: EMERGENCY MEDICINE | Admitting: EMERGENCY MEDICINE
Payer: COMMERCIAL

## 2021-12-31 ENCOUNTER — TELEPHONE (OUTPATIENT)
Dept: ONCOLOGY | Facility: CLINIC | Age: 21
End: 2021-12-31
Payer: COMMERCIAL

## 2021-12-31 VITALS
DIASTOLIC BLOOD PRESSURE: 74 MMHG | TEMPERATURE: 98.1 F | RESPIRATION RATE: 18 BRPM | HEART RATE: 114 BPM | SYSTOLIC BLOOD PRESSURE: 149 MMHG | OXYGEN SATURATION: 90 %

## 2021-12-31 DIAGNOSIS — D57.00 SICKLE CELL PAIN CRISIS (H): ICD-10-CM

## 2021-12-31 LAB
ABO/RH(D): NORMAL
ALBUMIN SERPL-MCNC: 3.9 G/DL (ref 3.4–5)
ALP SERPL-CCNC: 76 U/L (ref 40–150)
ALT SERPL W P-5'-P-CCNC: 54 U/L (ref 0–50)
ANION GAP SERPL CALCULATED.3IONS-SCNC: 8 MMOL/L (ref 3–14)
ANTIBODY SCREEN: NEGATIVE
AST SERPL W P-5'-P-CCNC: 62 U/L (ref 0–45)
BASOPHILS # BLD AUTO: 0.3 10E3/UL (ref 0–0.2)
BASOPHILS NFR BLD AUTO: 2 %
BILIRUB SERPL-MCNC: 3.3 MG/DL (ref 0.2–1.3)
BUN SERPL-MCNC: 10 MG/DL (ref 7–30)
CALCIUM SERPL-MCNC: 8.9 MG/DL (ref 8.5–10.1)
CHLORIDE BLD-SCNC: 111 MMOL/L (ref 94–109)
CO2 SERPL-SCNC: 21 MMOL/L (ref 20–32)
CREAT SERPL-MCNC: 0.55 MG/DL (ref 0.52–1.04)
EOSINOPHIL # BLD AUTO: 0.5 10E3/UL (ref 0–0.7)
EOSINOPHIL NFR BLD AUTO: 4 %
ERYTHROCYTE [DISTWIDTH] IN BLOOD BY AUTOMATED COUNT: 25.1 % (ref 10–15)
GFR SERPL CREATININE-BSD FRML MDRD: >90 ML/MIN/1.73M2
GLUCOSE BLD-MCNC: 112 MG/DL (ref 70–99)
HCT VFR BLD AUTO: 20.9 % (ref 35–47)
HGB BLD-MCNC: 7.5 G/DL (ref 11.7–15.7)
IMM GRANULOCYTES # BLD: 0.1 10E3/UL
IMM GRANULOCYTES NFR BLD: 1 %
LYMPHOCYTES # BLD AUTO: 2.6 10E3/UL (ref 0.8–5.3)
LYMPHOCYTES NFR BLD AUTO: 19 %
MCH RBC QN AUTO: 32.1 PG (ref 26.5–33)
MCHC RBC AUTO-ENTMCNC: 35.9 G/DL (ref 31.5–36.5)
MCV RBC AUTO: 89 FL (ref 78–100)
MONOCYTES # BLD AUTO: 1 10E3/UL (ref 0–1.3)
MONOCYTES NFR BLD AUTO: 7 %
NEUTROPHILS # BLD AUTO: 8.9 10E3/UL (ref 1.6–8.3)
NEUTROPHILS NFR BLD AUTO: 67 %
NRBC # BLD AUTO: 0.5 10E3/UL
NRBC BLD AUTO-RTO: 3 /100
PLATELET # BLD AUTO: 324 10E3/UL (ref 150–450)
POTASSIUM BLD-SCNC: 3.5 MMOL/L (ref 3.4–5.3)
PROT SERPL-MCNC: 7.8 G/DL (ref 6.8–8.8)
RBC # BLD AUTO: 2.34 10E6/UL (ref 3.8–5.2)
RETICS # AUTO: 0.6 10E6/UL (ref 0.03–0.1)
RETICS/RBC NFR AUTO: 25.5 % (ref 0.5–2)
SODIUM SERPL-SCNC: 140 MMOL/L (ref 133–144)
SPECIMEN EXPIRATION DATE: NORMAL
WBC # BLD AUTO: 13.4 10E3/UL (ref 4–11)

## 2021-12-31 PROCEDURE — 258N000003 HC RX IP 258 OP 636: Performed by: EMERGENCY MEDICINE

## 2021-12-31 PROCEDURE — 99284 EMERGENCY DEPT VISIT MOD MDM: CPT | Mod: 25 | Performed by: EMERGENCY MEDICINE

## 2021-12-31 PROCEDURE — 250N000009 HC RX 250: Performed by: EMERGENCY MEDICINE

## 2021-12-31 PROCEDURE — 85025 COMPLETE CBC W/AUTO DIFF WBC: CPT | Performed by: EMERGENCY MEDICINE

## 2021-12-31 PROCEDURE — 86850 RBC ANTIBODY SCREEN: CPT | Performed by: EMERGENCY MEDICINE

## 2021-12-31 PROCEDURE — 96375 TX/PRO/DX INJ NEW DRUG ADDON: CPT | Performed by: EMERGENCY MEDICINE

## 2021-12-31 PROCEDURE — 80053 COMPREHEN METABOLIC PANEL: CPT | Performed by: EMERGENCY MEDICINE

## 2021-12-31 PROCEDURE — 36415 COLL VENOUS BLD VENIPUNCTURE: CPT | Performed by: EMERGENCY MEDICINE

## 2021-12-31 PROCEDURE — 86901 BLOOD TYPING SEROLOGIC RH(D): CPT | Performed by: EMERGENCY MEDICINE

## 2021-12-31 PROCEDURE — 250N000011 HC RX IP 250 OP 636: Performed by: EMERGENCY MEDICINE

## 2021-12-31 PROCEDURE — 250N000013 HC RX MED GY IP 250 OP 250 PS 637: Performed by: EMERGENCY MEDICINE

## 2021-12-31 PROCEDURE — 99284 EMERGENCY DEPT VISIT MOD MDM: CPT | Performed by: EMERGENCY MEDICINE

## 2021-12-31 PROCEDURE — 96361 HYDRATE IV INFUSION ADD-ON: CPT | Performed by: EMERGENCY MEDICINE

## 2021-12-31 PROCEDURE — 85045 AUTOMATED RETICULOCYTE COUNT: CPT | Performed by: EMERGENCY MEDICINE

## 2021-12-31 PROCEDURE — 96374 THER/PROPH/DIAG INJ IV PUSH: CPT | Performed by: EMERGENCY MEDICINE

## 2021-12-31 RX ORDER — OXYCODONE HYDROCHLORIDE 10 MG/1
20 TABLET ORAL ONCE
Status: COMPLETED | OUTPATIENT
Start: 2021-12-31 | End: 2021-12-31

## 2021-12-31 RX ORDER — HEPARIN SODIUM (PORCINE) LOCK FLUSH IV SOLN 100 UNIT/ML 100 UNIT/ML
5-10 SOLUTION INTRAVENOUS
Status: DISCONTINUED | OUTPATIENT
Start: 2021-12-31 | End: 2022-01-01 | Stop reason: HOSPADM

## 2021-12-31 RX ORDER — KETOROLAC TROMETHAMINE 15 MG/ML
15 INJECTION, SOLUTION INTRAMUSCULAR; INTRAVENOUS ONCE
Status: COMPLETED | OUTPATIENT
Start: 2021-12-31 | End: 2021-12-31

## 2021-12-31 RX ADMIN — Medication 5 ML: at 22:37

## 2021-12-31 RX ADMIN — SODIUM CHLORIDE, POTASSIUM CHLORIDE, SODIUM LACTATE AND CALCIUM CHLORIDE 1000 ML: 600; 310; 30; 20 INJECTION, SOLUTION INTRAVENOUS at 20:37

## 2021-12-31 RX ADMIN — Medication 4 MG: at 21:21

## 2021-12-31 RX ADMIN — OXYCODONE HYDROCHLORIDE 20 MG: 10 TABLET ORAL at 20:36

## 2021-12-31 RX ADMIN — OXYCODONE HYDROCHLORIDE 20 MG: 10 TABLET ORAL at 21:58

## 2021-12-31 RX ADMIN — KETOROLAC TROMETHAMINE 15 MG: 15 INJECTION, SOLUTION INTRAMUSCULAR; INTRAVENOUS at 20:36

## 2021-12-31 ASSESSMENT — ENCOUNTER SYMPTOMS
SEIZURES: 0
DIFFICULTY URINATING: 0
CONFUSION: 0
SHORTNESS OF BREATH: 0
NAUSEA: 0
ABDOMINAL PAIN: 0
HEADACHES: 0
DIARRHEA: 0
BACK PAIN: 1
EYE REDNESS: 0
FEVER: 0
COUGH: 0
VOMITING: 0

## 2021-12-31 NOTE — CONFIDENTIAL NOTE
"Pt called in to triage requesting IVF pain meds for all over pain rated 10/10 x \"awhile\" days. Stated last took prn oxycodone and MS contin @ 6 am this morning without relief. Denied any fevers, chills, cough, sob, chest pain, numbness or tingling or other symptoms not typical of sickle cell pain.   Pt's last infusion was 12/30/21, last clinic visit 12/20/21 with follow-up on 1/19/21 with Andrei HIGGINS  .   Patient states they do not have own ride and it will take 60 minutes long to get to cancer clinic.   Pt denied being out of home medications and needing any refills today.     If you do not hear from the infusion center by 2pm then you will not be able to get in for an infusion today.   Please note, if you are late for your appt, you risk losing your infusion appt as it may delay another patient's infusion who arrived on time.     8:26 paged Andrei HIGGINS    8:46 Andrei Machado returned call and it is Ok for Jennifer to come in for IVF/Pain if a spot is available for her   "

## 2022-01-01 ENCOUNTER — HOME INFUSION (PRE-WILLOW HOME INFUSION) (OUTPATIENT)
Dept: PHARMACY | Facility: CLINIC | Age: 22
End: 2022-01-01
Payer: COMMERCIAL

## 2022-01-01 NOTE — ED PROVIDER NOTES
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  Patient told to triage resident IV pain medications for all over body pain rated 10 out of 10 intensity for multiple days.  Last took as needed oxycodone MS Contin at 6 AM without adequate relief.  Denies fevers chills, cough, shortness of breath, chest pain, numbness or tingling.  Last infusion was 12/30/2021 with clinic visit was on the 20th for follow-up scheduled on January 19.  Patient describes her pain is located in her lower back, denies any chest pain.  Reports that this is typical of her sickle cell related pain but just much more severe.  Patient has a care plan here to receive Toradol, increase oral oxycodone, IV fluids.  Denies any recent illnesses, denies any fevers or chills.  Denies any cough, shortness of breath.    Past Medical History  Past Medical History:   Diagnosis Date     Anxiety      Bleeding disorder (H)      Blood clotting disorder (H)      Cerebral infarction (H) 2015     Cognitive developmental delay     low IQ. 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 (H)     right hand contractures     Iron overload due to repeated red blood cell transfusions      Migraines      Multiple subsegmental pulmonary emboli without acute cor pulmonale (H) 02/01/2021     Oppositional defiant behavior      Superficial venous thrombosis of arm, right 03/25/2021     Uncomplicated asthma      Past  Surgical History:   Procedure Laterality Date     AS INSERT TUNNELED CV 2 CATH W/O PORT/PUMP       CHOLECYSTECTOMY       CV RIGHT HEART CATH MEASUREMENTS RECORDED N/A 11/18/2021    Procedure: Right Heart Cath;  Surgeon: Jackson Stauffer MD;  Location:  HEART CARDIAC CATH LAB     INSERT PORT VASCULAR ACCESS Left 4/21/2021    Procedure: INSERTION, VASCULAR ACCESS PORT (NOT SURE ON SIDE UNTIL REMOVAL);  Surgeon: Rajan More MD;  Location: [facility] OR     IR CHEST PORT PLACEMENT > 5 YRS OF AGE  4/21/2021     IR CVC NON TUNNEL LINE REMOVAL  5/6/2021     IR CVC NON TUNNEL PLACEMENT  04/07/2020     IR CVC NON TUNNEL PLACEMENT  4/30/2021     IR PORT REMOVAL LEFT  4/21/2021     REMOVE PORT VASCULAR ACCESS Left 4/21/2021    Procedure: REMOVAL, VASCULAR ACCESS PORT LEFT;  Surgeon: Rajan More MD;  Location: [facility] OR     REPAIR TENDON ELBOW Right 10/02/2019    Procedure: Right Elbow Flexor Lengthening, Flexor Pronator Slide Of Wrist and Finger, Thumb Adductor Lengthening;  Surgeon: Anai Franco MD;  Location: [facility] OR     TONSILLECTOMY Bilateral 10/02/2019    Procedure: Bilateral Tonsillectomy;  Surgeon: Farhana Guy MD;  Location: [facility] OR     ZZC BREAST REDUCTION (INCLUDES LIPO) TIER 3 Bilateral 04/23/2019     acetaminophen (TYLENOL) 325 MG tablet  albuterol (PROAIR HFA/PROVENTIL HFA/VENTOLIN HFA) 108 (90 Base) MCG/ACT inhaler  albuterol (PROVENTIL) (2.5 MG/3ML) 0.083% neb solution  ARIPiprazole (ABILIFY) 2 MG tablet  budesonide-formoterol (SYMBICORT) 160-4.5 MCG/ACT Inhaler  cefdinir (OMNICEF) 300 MG capsule  deferasirox (JADENU) 360 MG tablet  diphenhydrAMINE (BENADRYL) 25 MG capsule  EPINEPHrine (ANY BX GENERIC EQUIV) 0.3 MG/0.3ML injection 2-pack  Hydroxyurea 1000 MG TABS  hydrOXYzine (ATARAX) 25 MG tablet  lidocaine-prilocaine (EMLA) 2.5-2.5 % external cream  medroxyPROGESTERone (DEPO-PROVERA) 150 MG/ML IM injection  morphine (MS CONTIN) 15 MG CR tablet  naloxone (NARCAN) 4 MG/0.1ML nasal  spray  omeprazole (PRILOSEC) 20 MG DR capsule  ondansetron (ZOFRAN) 8 MG tablet  oxyCODONE IR (ROXICODONE) 15 MG tablet  sertraline (ZOLOFT) 100 MG tablet  warfarin ANTICOAGULANT (COUMADIN) 5 MG tablet  warfarin ANTICOAGULANT (COUMADIN) 5 MG tablet  warfarin ANTICOAGULANT (COUMADIN) 7.5 MG tablet      Allergies   Allergen Reactions     Contrast Dye      Hives and breathing issues     Fish-Derived Products Hives     Seafood Hives     Diagnostic X-Ray Materials      Gadolinium      Family History  Family History   Problem Relation Age of Onset     Sickle Cell Trait Mother      Hypertension Mother      Asthma Mother      Sickle Cell Trait Father      Social History   Social History     Tobacco Use     Smoking status: Never Smoker     Smokeless tobacco: Never Used   Substance Use Topics     Alcohol use: Not Currently     Alcohol/week: 0.0 standard drinks     Drug use: Never      Past medical history, past surgical history, medications, allergies, family history, and social history were reviewed with the patient. No additional pertinent items.       Review of Systems   Constitutional: Negative for fever.   HENT: Negative for congestion.    Eyes: Negative for redness.   Respiratory: Negative for cough and shortness of breath.    Cardiovascular: Negative for chest pain.   Gastrointestinal: Negative for abdominal pain, diarrhea, nausea and vomiting.   Genitourinary: Negative for difficulty urinating.   Musculoskeletal: Positive for back pain.   Skin: Negative for rash.   Neurological: Negative for seizures and headaches.   Psychiatric/Behavioral: Negative for confusion.     A complete review of systems was performed with pertinent positives and negatives noted in the HPI, and all other systems negative.    Physical Exam   BP: (!) 150/86  Pulse: 96  Temp: 98.1  F (36.7  C)  Resp: 16  SpO2: 99 %  Physical Exam  Constitutional:       General: She is awake. She is in acute distress.      Appearance: Normal appearance. She is  well-developed. She is ill-appearing. She is not toxic-appearing.   HENT:      Head: Atraumatic.      Mouth/Throat:      Pharynx: No oropharyngeal exudate.   Eyes:      General: No scleral icterus.     Pupils: Pupils are equal, round, and reactive to light.   Cardiovascular:      Rate and Rhythm: Normal rate and regular rhythm.      Heart sounds: Normal heart sounds, S1 normal and S2 normal.   Pulmonary:      Effort: No respiratory distress.      Breath sounds: Normal breath sounds.   Abdominal:      General: Bowel sounds are normal.      Palpations: Abdomen is soft.      Tenderness: There is no abdominal tenderness.   Musculoskeletal:         General: No tenderness.      Comments: Moderate tenderness palpation in lumbar spine, midline.   Skin:     General: Skin is warm.      Findings: No rash.   Neurological:      Mental Status: She is alert and oriented to person, place, and time.      Comments: Normal bilateral lower extremity strength and sensation.   Psychiatric:         Attention and Perception: Attention normal.         Mood and Affect: Mood normal.         Speech: Speech normal.         Behavior: Behavior normal. Behavior is cooperative.         ED Course      Procedures                     Results for orders placed or performed during the hospital encounter of 12/31/21   Comprehensive metabolic panel     Status: Abnormal   Result Value Ref Range    Sodium 140 133 - 144 mmol/L    Potassium 3.5 3.4 - 5.3 mmol/L    Chloride 111 (H) 94 - 109 mmol/L    Carbon Dioxide (CO2) 21 20 - 32 mmol/L    Anion Gap 8 3 - 14 mmol/L    Urea Nitrogen 10 7 - 30 mg/dL    Creatinine 0.55 0.52 - 1.04 mg/dL    Calcium 8.9 8.5 - 10.1 mg/dL    Glucose 112 (H) 70 - 99 mg/dL    Alkaline Phosphatase 76 40 - 150 U/L    AST 62 (H) 0 - 45 U/L    ALT 54 (H) 0 - 50 U/L    Protein Total 7.8 6.8 - 8.8 g/dL    Albumin 3.9 3.4 - 5.0 g/dL    Bilirubin Total 3.3 (H) 0.2 - 1.3 mg/dL    GFR Estimate >90 >60 mL/min/1.73m2   Reticulocyte count      Status: Abnormal   Result Value Ref Range    % Reticulocyte 25.5 (H) 0.5 - 2.0 %    Absolute Reticulocyte 0.597 (H) 0.025 - 0.095 10e6/uL   CBC with platelets and differential     Status: Abnormal   Result Value Ref Range    WBC Count 13.4 (H) 4.0 - 11.0 10e3/uL    RBC Count 2.34 (L) 3.80 - 5.20 10e6/uL    Hemoglobin 7.5 (L) 11.7 - 15.7 g/dL    Hematocrit 20.9 (L) 35.0 - 47.0 %    MCV 89 78 - 100 fL    MCH 32.1 26.5 - 33.0 pg    MCHC 35.9 31.5 - 36.5 g/dL    RDW 25.1 (H) 10.0 - 15.0 %    Platelet Count 324 150 - 450 10e3/uL    % Neutrophils 67 %    % Lymphocytes 19 %    % Monocytes 7 %    % Eosinophils 4 %    % Basophils 2 %    % Immature Granulocytes 1 %    NRBCs per 100 WBC 3 (H) <1 /100    Absolute Neutrophils 8.9 (H) 1.6 - 8.3 10e3/uL    Absolute Lymphocytes 2.6 0.8 - 5.3 10e3/uL    Absolute Monocytes 1.0 0.0 - 1.3 10e3/uL    Absolute Eosinophils 0.5 0.0 - 0.7 10e3/uL    Absolute Basophils 0.3 (H) 0.0 - 0.2 10e3/uL    Absolute Immature Granulocytes 0.1 <=0.4 10e3/uL    Absolute NRBCs 0.5 10e3/uL   Adult Type and Screen     Status: None   Result Value Ref Range    ABO/RH(D) O POS     Antibody Screen Negative Negative    SPECIMEN EXPIRATION DATE [id number]    CBC with platelets differential     Status: Abnormal    Narrative    The following orders were created for panel order CBC with platelets differential.  Procedure                               Abnormality         Status                     ---------                               -----------         ------                     CBC with platelets and d...[[id number]]  Abnormal            Final result                 Please view results for these tests on the individual orders.   ABO/Rh type and screen *Canceled*     Status: None ()    Narrative    The following orders were created for panel order ABO/Rh type and screen.  Procedure                               Abnormality         Status                     ---------                               -----------          ------                     Adult Type and Screen[[id number]]                                                         Please view results for these tests on the individual orders.   ABO/Rh type and screen     Status: None    Narrative    The following orders were created for panel order ABO/Rh type and screen.  Procedure                               Abnormality         Status                     ---------                               -----------         ------                     Adult Type and Screen[[id number]]                            Final result                 Please view results for these tests on the individual orders.     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  Pain is consistent with the patient's sickle cell pain.  Concern for pain crisis, not consistent with acute chest syndrome.  Will adhere to the patient care plan and give IV Toradol, IV fluids, p.o. oxycodone, check labs, reassess.  Notified the patient that we will will do no more than 3 rounds of medications unless she gets admitted to the hospital.  Patient voiced understanding and agreement with the plan.    Reassessed the patient repeatedly and her pain improved from 10 out of 10 severity to 7-8 out of 10  severity to 6 out of 10 in severity with the last dose of oxycodone. Along the way, she requested a dose of pain control dose ketamine which has worked for her in the past. She feels significantly better and okay to go home. Her labs are reassuring consistent with her prior labs, good reticulocyte counts, anemia present but no indication for transfusion at this time. Will discharge    I have reviewed the nursing notes. I have reviewed the findings, diagnosis, plan and need for follow up with the patient.    New Prescriptions    No medications on file       Final diagnoses:   Sickle cell pain crisis (H)       --  Jered Slade  [facility] EMERGENCY DEPARTMENT  12/31/2021     Jered Slade MD  12/31/21 [id number]

## 2022-01-02 ENCOUNTER — HOSPITAL ENCOUNTER (EMERGENCY)
Facility: CLINIC | Age: 22
Discharge: HOME | End: 2022-01-03
Attending: EMERGENCY MEDICINE | Admitting: EMERGENCY MEDICINE
Payer: COMMERCIAL

## 2022-01-02 VITALS
RESPIRATION RATE: 17 BRPM | SYSTOLIC BLOOD PRESSURE: 148 MMHG | HEIGHT: 64 IN | DIASTOLIC BLOOD PRESSURE: 88 MMHG | BODY MASS INDEX: 29.02 KG/M2 | WEIGHT: 170 LBS | HEART RATE: 114 BPM | OXYGEN SATURATION: 90 % | TEMPERATURE: 98.9 F

## 2022-01-02 DIAGNOSIS — D57.00 SICKLE CELL PAIN CRISIS (H): ICD-10-CM

## 2022-01-02 LAB
BASOPHILS # BLD AUTO: 0.3 10E3/UL (ref 0–0.2)
BASOPHILS NFR BLD AUTO: 2 %
EOSINOPHIL # BLD AUTO: 0.4 10E3/UL (ref 0–0.7)
EOSINOPHIL NFR BLD AUTO: 3 %
ERYTHROCYTE [DISTWIDTH] IN BLOOD BY AUTOMATED COUNT: 23.9 % (ref 10–15)
HCT VFR BLD AUTO: 21.6 % (ref 35–47)
HGB BLD-MCNC: 7.6 G/DL (ref 11.7–15.7)
HOLD SPECIMEN: NORMAL
IMM GRANULOCYTES # BLD: 0.1 10E3/UL
IMM GRANULOCYTES NFR BLD: 1 %
LACTATE SERPL-SCNC: 0.8 MMOL/L (ref 0.7–2)
LYMPHOCYTES # BLD AUTO: 2.5 10E3/UL (ref 0.8–5.3)
LYMPHOCYTES NFR BLD AUTO: 18 %
MCH RBC QN AUTO: 31.3 PG (ref 26.5–33)
MCHC RBC AUTO-ENTMCNC: 35.2 G/DL (ref 31.5–36.5)
MCV RBC AUTO: 89 FL (ref 78–100)
MONOCYTES # BLD AUTO: 0.9 10E3/UL (ref 0–1.3)
MONOCYTES NFR BLD AUTO: 7 %
NEUTROPHILS # BLD AUTO: 9.1 10E3/UL (ref 1.6–8.3)
NEUTROPHILS NFR BLD AUTO: 69 %
NRBC # BLD AUTO: 0.5 10E3/UL
NRBC BLD AUTO-RTO: 4 /100
PLATELET # BLD AUTO: 342 10E3/UL (ref 150–450)
RBC # BLD AUTO: 2.43 10E6/UL (ref 3.8–5.2)
RETICS # AUTO: 0.59 10E6/UL (ref 0.03–0.1)
RETICS/RBC NFR AUTO: 24.3 % (ref 0.5–2)
WBC # BLD AUTO: 13.3 10E3/UL (ref 4–11)

## 2022-01-02 PROCEDURE — 250N000009 HC RX 250: Performed by: EMERGENCY MEDICINE

## 2022-01-02 PROCEDURE — 83605 ASSAY OF LACTIC ACID: CPT | Performed by: EMERGENCY MEDICINE

## 2022-01-02 PROCEDURE — 99283 EMERGENCY DEPT VISIT LOW MDM: CPT | Performed by: EMERGENCY MEDICINE

## 2022-01-02 PROCEDURE — 99285 EMERGENCY DEPT VISIT HI MDM: CPT | Mod: 25 | Performed by: EMERGENCY MEDICINE

## 2022-01-02 PROCEDURE — 85045 AUTOMATED RETICULOCYTE COUNT: CPT | Performed by: EMERGENCY MEDICINE

## 2022-01-02 PROCEDURE — 36415 COLL VENOUS BLD VENIPUNCTURE: CPT | Performed by: EMERGENCY MEDICINE

## 2022-01-02 PROCEDURE — 96374 THER/PROPH/DIAG INJ IV PUSH: CPT | Performed by: EMERGENCY MEDICINE

## 2022-01-02 PROCEDURE — 96375 TX/PRO/DX INJ NEW DRUG ADDON: CPT | Performed by: EMERGENCY MEDICINE

## 2022-01-02 PROCEDURE — 250N000011 HC RX IP 250 OP 636: Performed by: EMERGENCY MEDICINE

## 2022-01-02 PROCEDURE — 250N000013 HC RX MED GY IP 250 OP 250 PS 637: Performed by: EMERGENCY MEDICINE

## 2022-01-02 PROCEDURE — 80053 COMPREHEN METABOLIC PANEL: CPT | Performed by: EMERGENCY MEDICINE

## 2022-01-02 PROCEDURE — 85025 COMPLETE CBC W/AUTO DIFF WBC: CPT | Performed by: EMERGENCY MEDICINE

## 2022-01-02 PROCEDURE — 258N000003 HC RX IP 258 OP 636: Performed by: EMERGENCY MEDICINE

## 2022-01-02 PROCEDURE — 96361 HYDRATE IV INFUSION ADD-ON: CPT | Performed by: EMERGENCY MEDICINE

## 2022-01-02 RX ORDER — SODIUM CHLORIDE, SODIUM LACTATE, POTASSIUM CHLORIDE, CALCIUM CHLORIDE 600; 310; 30; 20 MG/100ML; MG/100ML; MG/100ML; MG/100ML
125 INJECTION, SOLUTION INTRAVENOUS CONTINUOUS
Status: DISCONTINUED | OUTPATIENT
Start: 2022-01-02 | End: 2022-01-03 | Stop reason: HOSPADM

## 2022-01-02 RX ORDER — OXYCODONE HYDROCHLORIDE 10 MG/1
10 TABLET ORAL ONCE
Status: DISCONTINUED | OUTPATIENT
Start: 2022-01-02 | End: 2022-01-03 | Stop reason: HOSPADM

## 2022-01-02 RX ORDER — HEPARIN SODIUM (PORCINE) LOCK FLUSH IV SOLN 100 UNIT/ML 100 UNIT/ML
5-10 SOLUTION INTRAVENOUS
Status: DISCONTINUED | OUTPATIENT
Start: 2022-01-02 | End: 2022-01-03 | Stop reason: HOSPADM

## 2022-01-02 RX ORDER — OXYCODONE HYDROCHLORIDE 10 MG/1
20 TABLET ORAL ONCE
Status: COMPLETED | OUTPATIENT
Start: 2022-01-02 | End: 2022-01-02

## 2022-01-02 RX ORDER — ONDANSETRON 2 MG/ML
8 INJECTION INTRAMUSCULAR; INTRAVENOUS ONCE
Status: COMPLETED | OUTPATIENT
Start: 2022-01-02 | End: 2022-01-02

## 2022-01-02 RX ORDER — KETOROLAC TROMETHAMINE 15 MG/ML
15 INJECTION, SOLUTION INTRAMUSCULAR; INTRAVENOUS ONCE
Status: COMPLETED | OUTPATIENT
Start: 2022-01-02 | End: 2022-01-02

## 2022-01-02 RX ADMIN — SODIUM CHLORIDE, POTASSIUM CHLORIDE, SODIUM LACTATE AND CALCIUM CHLORIDE 1000 ML: 600; 310; 30; 20 INJECTION, SOLUTION INTRAVENOUS at 19:58

## 2022-01-02 RX ADMIN — HEPARIN 5 ML: 100 SYRINGE at 23:55

## 2022-01-02 RX ADMIN — OXYCODONE HYDROCHLORIDE 20 MG: 10 TABLET ORAL at 19:58

## 2022-01-02 RX ADMIN — Medication 7.5 MG: at 21:34

## 2022-01-02 RX ADMIN — KETOROLAC TROMETHAMINE 15 MG: 15 INJECTION, SOLUTION INTRAMUSCULAR; INTRAVENOUS at 19:58

## 2022-01-02 RX ADMIN — ONDANSETRON 8 MG: 2 INJECTION INTRAMUSCULAR; INTRAVENOUS at 19:58

## 2022-01-02 ASSESSMENT — MIFFLIN-ST. JEOR: SCORE: 1516.11

## 2022-01-02 NOTE — ED TRIAGE NOTES
"Patient presents ambulatory to triage with complaints of a sickle cell crisis that started today. Patient reports that she took her home medications, but that they have been unhelpful and that she is experiencing pain \"all over.\"  "

## 2022-01-03 ENCOUNTER — TELEPHONE (OUTPATIENT)
Dept: ANTICOAGULATION | Facility: CLINIC | Age: 22
End: 2022-01-03

## 2022-01-03 ENCOUNTER — PATIENT OUTREACH (OUTPATIENT)
Dept: CARE COORDINATION | Facility: CLINIC | Age: 22
End: 2022-01-03
Payer: COMMERCIAL

## 2022-01-03 ENCOUNTER — INFUSION THERAPY VISIT (OUTPATIENT)
Dept: INFUSION THERAPY | Facility: CLINIC | Age: 22
End: 2022-01-03
Attending: PEDIATRICS
Payer: COMMERCIAL

## 2022-01-03 ENCOUNTER — TELEPHONE (OUTPATIENT)
Dept: ONCOLOGY | Facility: CLINIC | Age: 22
End: 2022-01-03
Payer: COMMERCIAL

## 2022-01-03 VITALS
SYSTOLIC BLOOD PRESSURE: 120 MMHG | TEMPERATURE: 98.3 F | DIASTOLIC BLOOD PRESSURE: 69 MMHG | OXYGEN SATURATION: 90 % | RESPIRATION RATE: 20 BRPM | HEART RATE: 109 BPM

## 2022-01-03 DIAGNOSIS — D57.00 SICKLE CELL PAIN CRISIS (H): ICD-10-CM

## 2022-01-03 DIAGNOSIS — I27.82 CHRONIC PULMONARY EMBOLISM WITHOUT ACUTE COR PULMONALE, UNSPECIFIED PULMONARY EMBOLISM TYPE (H): Primary | ICD-10-CM

## 2022-01-03 DIAGNOSIS — G81.10 SPASTIC HEMIPLEGIA, UNSPECIFIED ETIOLOGY, UNSPECIFIED LATERALITY (H): Primary | ICD-10-CM

## 2022-01-03 PROCEDURE — 96376 TX/PRO/DX INJ SAME DRUG ADON: CPT

## 2022-01-03 PROCEDURE — 250N000011 HC RX IP 250 OP 636: Performed by: PEDIATRICS

## 2022-01-03 PROCEDURE — 258N000003 HC RX IP 258 OP 636: Performed by: PEDIATRICS

## 2022-01-03 PROCEDURE — 96374 THER/PROPH/DIAG INJ IV PUSH: CPT

## 2022-01-03 PROCEDURE — 96361 HYDRATE IV INFUSION ADD-ON: CPT

## 2022-01-03 RX ORDER — OXYCODONE HYDROCHLORIDE 15 MG/1
15 TABLET ORAL EVERY 4 HOURS PRN
Qty: 45 TABLET | Refills: 0 | Status: SHIPPED | OUTPATIENT
Start: 2022-01-03 | End: 2022-01-17

## 2022-01-03 RX ORDER — HEPARIN SODIUM (PORCINE) LOCK FLUSH IV SOLN 100 UNIT/ML 100 UNIT/ML
5 SOLUTION INTRAVENOUS
Status: CANCELLED | OUTPATIENT
Start: 2022-04-01

## 2022-01-03 RX ORDER — MORPHINE SULFATE 2 MG/ML
2 INJECTION, SOLUTION INTRAMUSCULAR; INTRAVENOUS
Status: CANCELLED
Start: 2022-04-01

## 2022-01-03 RX ORDER — DIPHENHYDRAMINE HCL 25 MG
25 CAPSULE ORAL
Status: CANCELLED
Start: 2022-04-01

## 2022-01-03 RX ORDER — HEPARIN SODIUM,PORCINE 10 UNIT/ML
5 VIAL (ML) INTRAVENOUS
Status: DISCONTINUED | OUTPATIENT
Start: 2022-01-03 | End: 2022-01-03 | Stop reason: HOSPADM

## 2022-01-03 RX ORDER — MORPHINE SULFATE 2 MG/ML
2 INJECTION, SOLUTION INTRAMUSCULAR; INTRAVENOUS
Status: DISCONTINUED | OUTPATIENT
Start: 2022-01-03 | End: 2022-01-03 | Stop reason: HOSPADM

## 2022-01-03 RX ORDER — ONDANSETRON 8 MG/1
8 TABLET, FILM COATED ORAL
Status: CANCELLED
Start: 2022-04-01

## 2022-01-03 RX ORDER — HEPARIN SODIUM,PORCINE 10 UNIT/ML
5 VIAL (ML) INTRAVENOUS
Status: CANCELLED | OUTPATIENT
Start: 2022-04-01

## 2022-01-03 RX ORDER — HEPARIN SODIUM (PORCINE) LOCK FLUSH IV SOLN 100 UNIT/ML 100 UNIT/ML
5 SOLUTION INTRAVENOUS
Status: DISCONTINUED | OUTPATIENT
Start: 2022-01-03 | End: 2022-01-03 | Stop reason: HOSPADM

## 2022-01-03 RX ADMIN — MORPHINE SULFATE 2 MG: 2 INJECTION, SOLUTION INTRAMUSCULAR; INTRAVENOUS at 12:02

## 2022-01-03 RX ADMIN — Medication 5 ML: at 12:47

## 2022-01-03 RX ADMIN — DEXTROSE AND SODIUM CHLORIDE 1000 ML: 5; 450 INJECTION, SOLUTION INTRAVENOUS at 10:03

## 2022-01-03 RX ADMIN — MORPHINE SULFATE 2 MG: 2 INJECTION, SOLUTION INTRAMUSCULAR; INTRAVENOUS at 11:05

## 2022-01-03 RX ADMIN — MORPHINE SULFATE 2 MG: 2 INJECTION, SOLUTION INTRAMUSCULAR; INTRAVENOUS at 10:05

## 2022-01-03 ASSESSMENT — ENCOUNTER SYMPTOMS
ABDOMINAL PAIN: 0
NECK PAIN: 0
HEADACHES: 0
NAUSEA: 0
DIFFICULTY URINATING: 0
BACK PAIN: 0
FEVER: 0
SHORTNESS OF BREATH: 0
CHILLS: 0
NECK STIFFNESS: 0
WOUND: 0
NERVOUS/ANXIOUS: 0
VOMITING: 0

## 2022-01-03 NOTE — TELEPHONE ENCOUNTER
Narcotic Refill Request    Medication(s) requested:  Oxycodone  Person Requesting Refill:Jennifer calderon  What pain is the medication treating: sickle cell pain  How is the medication being taken?:1 tablet every 4 hours with max 6 tablets  Does pt have enough for today? yes  Is pain being adequately controlled on the current regimen?: okay, still requires infusion  Experiencing any side effects from medication?: denies    Date of most recent appointment: 12/20/21  Any No Show Visits: none recently  Next appointment:  1/21/22 Andrei HIGGINS  Last fill date and by whom:  12/27/21 Andrei Patiño   Reviewed:     Routed provider Dr. Duncan

## 2022-01-03 NOTE — PROGRESS NOTES
Social Work Intervention  [facility]    Data/Intervention:    Patient Name:  Jennifer Cervantes  /Age:  2000 (21 year old)    Visit Type: telephone  Referral Source: [facility]     Reason for Referral: Transportation    Collaborated With:    Jeremi Rodriguez     Intervention/Education/Resources Provided:  Covering for colleague NATALIA Fernando. Patient needing assistance with transportation to and from appointment this morning. Arranged ride with [employer] ([phone]) via online account. [employer] to  patient from home with a will call return ride. Patient will need to call when ready for return ride home.      Assessment/Plan:  Patient is aware of the transportation plan.  will remain available to assist with any other needs. Provided patient with contact information and availability.    Sol Ervin UDAY  [facility]   [facility]   Phone: [phone]  Pager: [phone]

## 2022-01-03 NOTE — TELEPHONE ENCOUNTER
"Still taking more than prescribed, in addition to frequent infusions. Will do 10% reduction in pill count. This is chronic pain in most cases, not acute sickle cell pain, which Jennifer and I have discussed many times though she has difficulty observing this trend (though this is common in these scenarios).     I have shared that I will continue to wean down on high use. I recommend a tablet and/or dose reduction every 4-8 weeks as tolerated. If she wants to explore other options like Suboxone, I will help do that, but I have concerns about her continued high use on a scheduled basis, as it is an \"as-needed\" medication.  "

## 2022-01-03 NOTE — PROGRESS NOTES
Nursing Note  Jennifer Cervantes presents today [facility] for:   Chief Complaint   Patient presents with     Infusion     IVF, pain meds     During today's [facility] appointment, orders from Dr. Duncan were completed.  Frequency: as needed    Progress note:  Patient identification verified by name and date of birth.  Assessment completed.  Vitals recorded in Doc Flowsheets.  Patient was provided with education regarding medication/procedure and possible side effects.  Patient verbalized understanding.     present during visit today: Not Applicable.    Treatment Conditions: Non-applicable.    Premedications: were not ordered.    Drug Waste Record: No    Infusion length and rate:  infusion given over approximately 2 hours    Labs: were not ordered for this appointment.    Vascular access: port accessed today.    Is the next appt scheduled? prn  Asymptomatic COVID test completed? No    Post Infusion Assessment:  Patient tolerated infusion without incident. At the end of infusion, patient inquired about getting 500 ml more of the IVF. Dr. Duncan paged and MD ok'd extra 500 ml fluid volume today.     Discharge Plan:   Follow up plan of care with: ordering provider as scheduled.  Discharge instructions were reviewed with patient.  Patient/representative verbalized understanding of discharge instructions and all questions answered.  Patient discharged from [facility] in stable condition.    Yina Ford RN       Administrations This Visit     dextrose 5% and 0.45% NaCl BOLUS     Admin Date  01/03/2022 Action  New Bag Dose  1,000 mL Rate  250 mL/hr Route  Intravenous Administered By  Yina Ford RN          heparin 100 UNIT/ML injection 5 mL     Admin Date  01/03/2022 Action  Given Dose  5 mL Route  Intracatheter Administered By  Patricia Qureshi RN          morphine (PF) injection 2 mg     Admin Date  01/03/2022  Action  Given Dose  2 mg Route  Intravenous Administered By  Yina Ford, JOE           Admin Date  01/03/2022 Action  Given Dose  2 mg Route  Intravenous Administered By  Patricai Qureshi RN           Admin Date  01/03/2022 Action  Given Dose  2 mg Route  Intravenous Administered By  Yina Ford, JOE                /69 (BP Location: Left arm, Patient Position: Semi-Short's)   Pulse 109   Temp 98.3  F (36.8  C) (Oral)   Resp 20   SpO2 90%

## 2022-01-03 NOTE — LETTER
1/3/2022         RE: Jennifer Cervantes  [address]  [place] [address]        Dear Colleague,    Thank you for referring your patient, Jennifer Cervantes, to the [facility]. Please see a copy of my visit note below.    Nursing Note  Jennifer Cervantes presents today to [facility] for:   Chief Complaint   Patient presents with     Infusion     IVF, pain meds     During [facility]'s [facility] appointment, orders from Dr. Duncan were completed.  Frequency: as needed    Progress note:  Patient identification verified by name and date of birth.  Assessment completed.  Vitals recorded in Doc Flowsheets.  Patient was provided with education regarding medication/procedure and possible side effects.  Patient verbalized understanding.     present during visit today: Not Applicable.    Treatment Conditions: Non-applicable.    Premedications: were not ordered.    Drug Waste Record: No    Infusion length and rate:  infusion given over approximately 2 hours    Labs: were not ordered for this appointment.    Vascular access: port accessed today.    Is the next appt scheduled? prn  Asymptomatic COVID test completed? No    Post Infusion Assessment:  Patient tolerated infusion without incident. At the end of infusion, patient inquired about getting 500 ml more of the IVF. Dr. Duncan paged and MD ok'd extra 500 ml fluid volume today.     Discharge Plan:   Follow up plan of care with: ordering provider as scheduled.  Discharge instructions were reviewed with patient.  Patient/representative verbalized understanding of discharge instructions and all questions answered.  Patient discharged from [facility] in stable condition.    Yina Ford RN       Administrations This Visit     dextrose 5% and 0.45% NaCl BOLUS     Admin Date  01/03/2022 Action  New Bag Dose  1,000 mL Rate  250 mL/hr  Route  Intravenous Administered By  Yina Ford RN          heparin 100 UNIT/ML injection 5 mL     Admin Date  01/03/2022 Action  Given Dose  5 mL Route  Intracatheter Administered By  Patricia Qureshi RN          morphine (PF) injection 2 mg     Admin Date  01/03/2022 Action  Given Dose  2 mg Route  Intravenous Administered By  Yina Ford RN           Admin Date  01/03/2022 Action  Given Dose  2 mg Route  Intravenous Administered By  Patricia Qureshi RN           Admin Date  01/03/2022 Action  Given Dose  2 mg Route  Intravenous Administered By  Yina Ford RN                /69 (BP Location: Left arm, Patient Position: Semi-Short's)   Pulse 109   Temp 98.3  F (36.8  C) (Oral)   Resp 20   SpO2 90%           Again, thank you for allowing me to participate in the care of your patient.        Sincerely,        [facility]

## 2022-01-03 NOTE — ED PROVIDER NOTES
[place] EMERGENCY DEPARTMENT ([facility])  1/02/22  History     Chief Complaint   Patient presents with     Sickle Cell Pain Crisis     The history is provided by the patient.     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  The patient reports taking her pain medications today at approximately 4 PM, 15 mg oxycodone a 10 mg of MS Contin.  She reports her last infusion was 3 days ago.  She denies experiencing any chest pain or shortness of breath.  She denies experiencing any factious symptoms, such as a productive cough, rhinorrhea, or chills.  Per chart review, the patient presented to the ED on 12/31/2021 with C/L myalgia and back pain.  Patient was treated for sickle cell crisis, patient care plan was followed, IV Toradol was administered along with IV fluids, p.o. oxycodone, dose of ketamine.  Labs were reassuring, anemia present but no indication for transfusion at that time.  Patient was discharged.    Past Medical History  Past Medical History:   Diagnosis Date     Anxiety      Bleeding disorder (H)      Blood clotting disorder (H)      Cerebral infarction (H) 2015     Cognitive developmental delay     low IQ. 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 (H)     right hand contractures     Iron overload due to repeated red blood cell transfusions      Migraines      Multiple subsegmental pulmonary emboli without acute cor pulmonale (H) 02/01/2021     Oppositional defiant behavior      Superficial venous thrombosis of arm, right  03/25/2021     Uncomplicated asthma      Past Surgical History:   Procedure Laterality Date     AS INSERT TUNNELED CV 2 CATH W/O PORT/PUMP       CHOLECYSTECTOMY       CV RIGHT HEART CATH MEASUREMENTS RECORDED N/A 11/18/2021    Procedure: Right Heart Cath;  Surgeon: Jackson Stauffer MD;  Location:  HEART CARDIAC CATH LAB     INSERT PORT VASCULAR ACCESS Left 4/21/2021    Procedure: INSERTION, VASCULAR ACCESS PORT (NOT SURE ON SIDE UNTIL REMOVAL);  Surgeon: Rajan More MD;  Location: [facility] OR     IR CHEST PORT PLACEMENT > 5 YRS OF AGE  4/21/2021     IR CVC NON TUNNEL LINE REMOVAL  5/6/2021     IR CVC NON TUNNEL PLACEMENT  04/07/2020     IR CVC NON TUNNEL PLACEMENT  4/30/2021     IR PORT REMOVAL LEFT  4/21/2021     REMOVE PORT VASCULAR ACCESS Left 4/21/2021    Procedure: REMOVAL, VASCULAR ACCESS PORT LEFT;  Surgeon: Rajan More MD;  Location: [facility] OR     REPAIR TENDON ELBOW Right 10/02/2019    Procedure: Right Elbow Flexor Lengthening, Flexor Pronator Slide Of Wrist and Finger, Thumb Adductor Lengthening;  Surgeon: Anai Franco MD;  Location: [facility] OR     TONSILLECTOMY Bilateral 10/02/2019    Procedure: Bilateral Tonsillectomy;  Surgeon: Farhana Guy MD;  Location: [facility] OR     ZZC BREAST REDUCTION (INCLUDES LIPO) TIER 3 Bilateral 04/23/2019     acetaminophen (TYLENOL) 325 MG tablet  albuterol (PROAIR HFA/PROVENTIL HFA/VENTOLIN HFA) 108 (90 Base) MCG/ACT inhaler  albuterol (PROVENTIL) (2.5 MG/3ML) 0.083% neb solution  ARIPiprazole (ABILIFY) 2 MG tablet  budesonide-formoterol (SYMBICORT) 160-4.5 MCG/ACT Inhaler  cefdinir (OMNICEF) 300 MG capsule  deferasirox (JADENU) 360 MG tablet  diphenhydrAMINE (BENADRYL) 25 MG capsule  EPINEPHrine (ANY BX GENERIC EQUIV) 0.3 MG/0.3ML injection 2-pack  Hydroxyurea 1000 MG TABS  hydrOXYzine (ATARAX) 25 MG tablet  lidocaine-prilocaine (EMLA) 2.5-2.5 % external cream  medroxyPROGESTERone (DEPO-PROVERA) 150 MG/ML IM injection  morphine (MS CONTIN) 15 MG CR  tablet  naloxone (NARCAN) 4 MG/0.1ML nasal spray  omeprazole (PRILOSEC) 20 MG DR capsule  ondansetron (ZOFRAN) 8 MG tablet  oxyCODONE IR (ROXICODONE) 15 MG tablet  sertraline (ZOLOFT) 100 MG tablet  warfarin ANTICOAGULANT (COUMADIN) 5 MG tablet  warfarin ANTICOAGULANT (COUMADIN) 5 MG tablet  warfarin ANTICOAGULANT (COUMADIN) 7.5 MG tablet      Allergies   Allergen Reactions     Contrast Dye      Hives and breathing issues     Fish-Derived Products Hives     Seafood Hives     Diagnostic X-Ray Materials      Gadolinium      Family History  Family History   Problem Relation Age of Onset     Sickle Cell Trait Mother      Hypertension Mother      Asthma Mother      Sickle Cell Trait Father      Social History   Social History     Tobacco Use     Smoking status: Never Smoker     Smokeless tobacco: Never Used   Substance Use Topics     Alcohol use: Not Currently     Alcohol/week: 0.0 standard drinks     Drug use: Never      Past medical history, past surgical history, medications, allergies, family history, and social history were reviewed with the patient. No additional pertinent items.     I have reviewed the Medications, Allergies, Past Medical and Surgical History, and Social History in the Epic system.    Review of Systems   Constitutional: Negative for chills and fever.        Pain all over   Respiratory: Negative for shortness of breath.    Cardiovascular: Negative for chest pain.   Gastrointestinal: Negative for abdominal pain, nausea and vomiting.   Genitourinary: Negative for difficulty urinating.   Musculoskeletal: Negative for back pain, neck pain and neck stiffness.   Skin: Negative for pallor and wound.   Neurological: Negative for headaches.   Psychiatric/Behavioral: The patient is not nervous/anxious.      A complete review of systems was performed with pertinent positives and negatives noted in the HPI, and all other systems negative.    Physical Exam   BP: (!) 148/88  Pulse: 114  Temp: 98.9  F (37.2  " C)  Resp: 17  Height: 162.6 cm (5' 4\")  Weight: 77.1 kg (170 lb)  SpO2: 90 %      Physical Exam  Vitals and nursing note reviewed.   Constitutional:       General: She is not in acute distress.     Appearance: Normal appearance.   HENT:      Head: Normocephalic.      Nose: Nose normal.   Eyes:      Pupils: Pupils are equal, round, and reactive to light.   Cardiovascular:      Rate and Rhythm: Normal rate and regular rhythm.   Pulmonary:      Effort: Pulmonary effort is normal.   Abdominal:      General: There is no distension.   Musculoskeletal:         General: No deformity. Normal range of motion.      Cervical back: Normal range of motion.   Skin:     General: Skin is warm.   Neurological:      Mental Status: She is alert and oriented to person, place, and time.   Psychiatric:         Mood and Affect: Mood normal.         ED Course     At 8:06 PM the patient was seen and examined by Raul Diaz DO in Room VTD.     ED Course as of 01/03/22 0034   Sun Jan 02, 2022   1941 Patient presents to the ED for evaluation of sickle cell pain crisis.  She has pain all over.  Last took oxycodone/MS Contin at 4 PM.    Differential diagnosis includes vaso-occlusive crisis/sickle pain crisis, exacerbation of chronic pain, aplastic crisis.  No chest pain or shortness of breath to suggest acute chest syndrome.    On arrival, patient is normal vital signs from some tachycardia.  She overall appears uncomfortable due to the pain.  Her physical exam is unremarkable.    Plan for CBC, CMP, reticulocyte count.  We will give pain medications per her protocol       The medical record was reviewed and interpreted.  Current labs reviewed and interpreted.  Previous labs reviewed and interpreted.     Results for orders placed or performed during the hospital encounter of 01/02/22 (from the past 24 hour(s))   [place] Draw    Narrative    The following orders were created for panel order [place] Draw.  Procedure                              "  Abnormality         Status                     ---------                               -----------         ------                     Extra Blue Top Tube[[id number]]                              Final result               Extra Red Top Tube[[id number]]                               Final result               Extra Green Top (Lithium...[[id number]]                                                 Extra Purple Top Tube[[id number]]                            Final result                 Please view results for these tests on the individual orders.   Extra Blue Top Tube   Result Value Ref Range    Hold Specimen JIC    Extra Red Top Tube   Result Value Ref Range    Hold Specimen JIC    Extra Purple Top Tube   Result Value Ref Range    Hold Specimen JIC    CBC with platelets differential    Narrative    The following orders were created for panel order CBC with platelets differential.  Procedure                               Abnormality         Status                     ---------                               -----------         ------                     CBC with platelets and d...[[id number]]  Abnormal            Final result                 Please view results for these tests on the individual orders.   Reticulocyte count   Result Value Ref Range    % Reticulocyte 24.3 (H) 0.5 - 2.0 %    Absolute Reticulocyte 0.590 (H) 0.025 - 0.095 10e6/uL   CBC with platelets and differential   Result Value Ref Range    WBC Count 13.3 (H) 4.0 - 11.0 10e3/uL    RBC Count 2.43 (L) 3.80 - 5.20 10e6/uL    Hemoglobin 7.6 (L) 11.7 - 15.7 g/dL    Hematocrit 21.6 (L) 35.0 - 47.0 %    MCV 89 78 - 100 fL    MCH 31.3 26.5 - 33.0 pg    MCHC 35.2 31.5 - 36.5 g/dL    RDW 23.9 (H) 10.0 - 15.0 %    Platelet Count 342 150 - 450 10e3/uL    % Neutrophils 69 %    % Lymphocytes 18 %    % Monocytes 7 %    % Eosinophils 3 %    % Basophils 2 %    % Immature Granulocytes 1 %    NRBCs per 100 WBC 4 (H) <1 /100    Absolute Neutrophils 9.1 (H) 1.6 - 8.3 10e3/uL     Absolute Lymphocytes 2.5 0.8 - 5.3 10e3/uL    Absolute Monocytes 0.9 0.0 - 1.3 10e3/uL    Absolute Eosinophils 0.4 0.0 - 0.7 10e3/uL    Absolute Basophils 0.3 (H) 0.0 - 0.2 10e3/uL    Absolute Immature Granulocytes 0.1 <=0.4 10e3/uL    Absolute NRBCs 0.5 10e3/uL   Lactic acid whole blood   Result Value Ref Range    Lactic Acid 0.8 0.7 - 2.0 mmol/L     *Note: Due to a large number of results and/or encounters for the requested time period, some results have not been displayed. A complete set of results can be found in Results Review.     Medications   lactated ringers BOLUS 1,000 mL (0 mLs Intravenous Stopped 1/2/22 2134)     Followed by   lactated ringers infusion (125 mL/hr Intravenous Not Given 1/2/22 2349)   oxyCODONE IR (ROXICODONE) tablet 10 mg (10 mg Oral Not Given 1/3/22 0000)   oxyCODONE IR (ROXICODONE) tablet 10 mg (10 mg Oral Not Given 1/3/22 0000)   heparin 100 UNIT/ML injection 5-10 mL (5 mLs Intracatheter Given 1/2/22 2355)   ondansetron (ZOFRAN) injection 8 mg (8 mg Intravenous Given 1/2/22 1958)   ketorolac (TORADOL) injection 15 mg (15 mg Intravenous Given 1/2/22 1958)   oxyCODONE IR (ROXICODONE) tablet 20 mg (20 mg Oral Given 1/2/22 1958)   ketamine (KETALAR) injection 7.5 mg (7.5 mg Intravenous Given 1/2/22 2134)             Assessments & Plan (with Medical Decision Making)   Jennifer Cervantes is a 21 year old female with history of sickle cell disease who presents to the ED with complaint of pain all over.  Unrelieved with her home MS Contin and oxycodone.    Differential diagnosis sickle cell pain crisis, exacerbation of chronic pain, anemia aplastic crisis patient denies any chest pain or shortness of breath to suggest acute chest syndrome.    On arrival, she is mildly tachycardic, is has normal vital signs.  She appears uncomfortable due to pain, but otherwise nontoxic.  Physical exam is unrevealing.    She is given pain medications per her protocol with plan to avoid IV narcotics.  Was given  oxycodone 20 mg x 2.  Continue to have pain so she was given 7 mg of IV ketamine, which reportedly has helped her previously.    Prior to reassessment, patient told the nurse that she felt better and wanted to leave.  She left before receiving any discharge paperwork.    Mild anemia of 7.6, no indication for acute transfusion.  No electrolyte abnormality.  LFTs/bilirubin are consistent with baseline.  Reticulocyte count is appropriately elevated.      I have reviewed the nursing notes.    I have reviewed the findings, diagnosis, plan and need for follow up with the patient.    New Prescriptions    No medications on file       Final diagnoses:   Sickle cell pain crisis (H)       IVito am serving as a trained medical scribe to document services personally performed by Raul Diaz DO based on the provider's statements to me.      Raul NEWMAN DO, was physically present and have reviewed and verified the accuracy of this note documented by Vito Conner.     Raul Diaz DO   1/2/2022   [facility] EMERGENCY DEPARTMENT     Raul Diaz DO  01/03/22 0035

## 2022-01-03 NOTE — TELEPHONE ENCOUNTER
ANTICOAGULATION  MANAGEMENT: Discharge Review    Jennifer Cervantes chart reviewed for anticoagulation continuity of care    Emergency room visit on 12/31/21 and 1/2/22-1/3/22 for sickle cell pain crisis.    Discharge disposition: Home    Results:    No results for input(s): INR, [id number], F2, ALMWH, AAUFH in the last 168 hours.  Anticoagulation inpatient management:     not applicable     Anticoagulation discharge instructions:     Warfarin dosing: No INR checked. No dosing instructions given as patient left ER before paperwork reviewed   Bridging: No   INR goal change: No      Medication changes affecting anticoagulation: No    Additional factors affecting anticoagulation: Yes: ongoing sickle cell pain crisis    Plan     Recommend to check INR on tomorrow 1/4/22    Spoke with Jennifer    Anticoagulation Calendar updated    PRINCESS MORENO RN

## 2022-01-03 NOTE — PATIENT INSTRUCTIONS
Dear Jennifer Cervantes    Thank you for choosing [facility] ([facility]) for your infusion.  The following information is a summary of our appointment as well as important reminders.      We look forward in seeing you on your next appointment here at [facility] ([facility]).  Please don t hesitate to call us at [phone] to reschedule any of your appointments or to speak with one of the [facility] registered nurses.  It was a pleasure taking care of you today.    Sincerely,    [facility]  [address] [place] [address]  [id number]  Phone:  [phone]

## 2022-01-03 NOTE — LETTER
Date:January 7, 2022      Provider requested that no letter be sent. Do not send.       [facility]

## 2022-01-03 NOTE — TELEPHONE ENCOUNTER
[facility] Telephone Triage Note    The following symptoms were reported:   Typical  Description:            Onset:  Last night  Location: back  Character: sharp           Intensity: 9/10    Accompanying Signs & Symptoms:  none    Chest Pain:  denies     Shortness of Breath:  denies     Fever:  denies     Chills:  denies   Cough/sore throat:  denies  Other:  Denies any new symptoms    Therapies Tried and outcome: tried MS Contin and Oxycodone at 6am    Improved by: nothing    The following provider was consulted: Per Therapy plan- Dr. Duncan     Meets protocol, Pt has had only one infusion on Sun 1/2/22 for this week.     The following advice/orders were given, and/or interventions recommended:   Appt offered for 10am IVF/Pain    Patient instructions and/or follow up:   This writer called and relayed information to Pt.  Pt was able to reverbalize understanding for 10am appt. Asked pt to repeat back info/plan.    0810  Abdullahi paged for transportation needs    Scheduling notified

## 2022-01-04 ENCOUNTER — TELEPHONE (OUTPATIENT)
Dept: ONCOLOGY | Facility: CLINIC | Age: 22
End: 2022-01-04
Payer: COMMERCIAL

## 2022-01-04 NOTE — CONFIDENTIAL NOTE
Felt like she was dismissed in hospital ER.   Was told that she would have to wait 14 hours to be seen in the ER. So did not wait. States she still feels like she needs to be seen but that she did not want to wait in the ER.   Advised to go back to ER.

## 2022-01-04 NOTE — CONFIDENTIAL NOTE
Having sharp abdominal pains, that make it difficult for her to move. Every time she moves she gets sharp pains and can not move it makes her hunch over and she is unable to walk.   This is different than her normal sickle cell pain.  No fever  Yes is nauseated and vomited x3 today.  Last took zofran 8:30 this morning.   Last BM was this morning, and that was normal.   Last took pain meds at 7 am this morning.   Crying on phone today.   10:41 paged Dr Duncan  10:43 Dr Duncan called back   Should go to ER to be evaluated.     Call placed to [facility] and let her know that she should go to the ER.     Call placed to [facility] and gave report to Kilo DIAZ.

## 2022-01-05 ENCOUNTER — INFUSION THERAPY VISIT (OUTPATIENT)
Dept: TRANSPLANT | Facility: CLINIC | Age: 22
End: 2022-01-05
Attending: PEDIATRICS
Payer: COMMERCIAL

## 2022-01-05 ENCOUNTER — TELEPHONE (OUTPATIENT)
Dept: ONCOLOGY | Facility: CLINIC | Age: 22
End: 2022-01-05
Payer: COMMERCIAL

## 2022-01-05 ENCOUNTER — PATIENT OUTREACH (OUTPATIENT)
Dept: CARE COORDINATION | Facility: CLINIC | Age: 22
End: 2022-01-05
Payer: COMMERCIAL

## 2022-01-05 ENCOUNTER — TELEPHONE (OUTPATIENT)
Dept: ANTICOAGULATION | Facility: CLINIC | Age: 22
End: 2022-01-05
Payer: COMMERCIAL

## 2022-01-05 DIAGNOSIS — D57.00 SICKLE CELL CRISIS (H): Primary | ICD-10-CM

## 2022-01-05 DIAGNOSIS — D57.00 SICKLE CELL PAIN CRISIS (H): ICD-10-CM

## 2022-01-05 DIAGNOSIS — G81.10 SPASTIC HEMIPLEGIA, UNSPECIFIED ETIOLOGY, UNSPECIFIED LATERALITY (H): Primary | ICD-10-CM

## 2022-01-05 LAB
ALBUMIN SERPL-MCNC: 4.2 G/DL (ref 3.4–5)
ALP SERPL-CCNC: 79 U/L (ref 40–150)
ALT SERPL W P-5'-P-CCNC: 61 U/L (ref 0–50)
ANION GAP SERPL CALCULATED.3IONS-SCNC: 11 MMOL/L (ref 3–14)
AST SERPL W P-5'-P-CCNC: 76 U/L (ref 0–45)
BILIRUB SERPL-MCNC: 3.1 MG/DL (ref 0.2–1.3)
BUN SERPL-MCNC: 9 MG/DL (ref 7–30)
CALCIUM SERPL-MCNC: 9.2 MG/DL (ref 8.5–10.1)
CHLORIDE BLD-SCNC: 111 MMOL/L (ref 94–109)
CO2 SERPL-SCNC: 15 MMOL/L (ref 20–32)
CREAT SERPL-MCNC: 0.53 MG/DL (ref 0.52–1.04)
GFR SERPL CREATININE-BSD FRML MDRD: >90 ML/MIN/1.73M2
GLUCOSE BLD-MCNC: 97 MG/DL (ref 70–99)
POTASSIUM BLD-SCNC: 4.7 MMOL/L (ref 3.4–5.3)
PROT SERPL-MCNC: 7.9 G/DL (ref 6.8–8.8)
SODIUM SERPL-SCNC: 137 MMOL/L (ref 133–144)

## 2022-01-05 PROCEDURE — 96376 TX/PRO/DX INJ SAME DRUG ADON: CPT

## 2022-01-05 PROCEDURE — 250N000011 HC RX IP 250 OP 636: Performed by: PEDIATRICS

## 2022-01-05 PROCEDURE — 96374 THER/PROPH/DIAG INJ IV PUSH: CPT

## 2022-01-05 PROCEDURE — 258N000003 HC RX IP 258 OP 636: Performed by: PEDIATRICS

## 2022-01-05 PROCEDURE — 96361 HYDRATE IV INFUSION ADD-ON: CPT

## 2022-01-05 RX ORDER — HEPARIN SODIUM (PORCINE) LOCK FLUSH IV SOLN 100 UNIT/ML 100 UNIT/ML
5 SOLUTION INTRAVENOUS
Status: CANCELLED | OUTPATIENT
Start: 2022-04-01

## 2022-01-05 RX ORDER — ONDANSETRON 8 MG/1
8 TABLET, FILM COATED ORAL
Status: CANCELLED
Start: 2022-04-01

## 2022-01-05 RX ORDER — MORPHINE SULFATE 2 MG/ML
2 INJECTION, SOLUTION INTRAMUSCULAR; INTRAVENOUS
Status: CANCELLED
Start: 2022-04-01

## 2022-01-05 RX ORDER — HEPARIN SODIUM (PORCINE) LOCK FLUSH IV SOLN 100 UNIT/ML 100 UNIT/ML
5 SOLUTION INTRAVENOUS
Status: DISCONTINUED | OUTPATIENT
Start: 2022-01-05 | End: 2022-01-05 | Stop reason: HOSPADM

## 2022-01-05 RX ORDER — MORPHINE SULFATE 2 MG/ML
2 INJECTION, SOLUTION INTRAMUSCULAR; INTRAVENOUS
Status: DISCONTINUED | OUTPATIENT
Start: 2022-01-05 | End: 2022-01-05 | Stop reason: HOSPADM

## 2022-01-05 RX ORDER — HEPARIN SODIUM,PORCINE 10 UNIT/ML
5 VIAL (ML) INTRAVENOUS
Status: CANCELLED | OUTPATIENT
Start: 2022-04-01

## 2022-01-05 RX ORDER — DIPHENHYDRAMINE HCL 25 MG
25 CAPSULE ORAL
Status: CANCELLED
Start: 2022-04-01

## 2022-01-05 RX ADMIN — MORPHINE SULFATE 2 MG: 2 INJECTION, SOLUTION INTRAMUSCULAR; INTRAVENOUS at 10:47

## 2022-01-05 RX ADMIN — MORPHINE SULFATE 2 MG: 2 INJECTION, SOLUTION INTRAMUSCULAR; INTRAVENOUS at 09:51

## 2022-01-05 RX ADMIN — DEXTROSE AND SODIUM CHLORIDE 500 ML: 5; 450 INJECTION, SOLUTION INTRAVENOUS at 10:17

## 2022-01-05 RX ADMIN — SODIUM CHLORIDE, PRESERVATIVE FREE 5 ML: 5 INJECTION INTRAVENOUS at 14:39

## 2022-01-05 RX ADMIN — MORPHINE SULFATE 2 MG: 2 INJECTION, SOLUTION INTRAMUSCULAR; INTRAVENOUS at 11:50

## 2022-01-05 NOTE — LETTER
1/5/2022       RE: Jennifer Cervantes  [address]  [facility] [address]      Dear Colleague,    Thank you for referring your patient, Jennifer Cervantes, to the [facility] [place]. Please see a copy of my visit note below.    Infusion Nursing Note:  Jennifer Cervantes presents today for add-on infusion  Patient seen by provider today: No   present during visit today: Not Applicable.    Note: Patient received 500 mL bolus D5NaCl and morphine x3 per generic infusion plan. Pain improved and stable upon discharge.    Intravenous Access:  Implanted Port.    Treatment Conditions:  Results reviewed, labs MET treatment parameters, ok to proceed with treatment.    Post Infusion Assessment:  Patient tolerated infusion without incident.     Discharge Plan:   Patient and/or family verbalized understanding of discharge instructions and all questions answered.        Again, thank you for allowing me to participate in the care of your patient.      Sincerely,    [facility]

## 2022-01-05 NOTE — PROGRESS NOTES
Social Work Note: Telephone Call  Oncology Clinic     Data/Intervention:  Patient Name: Jennifer Cervantes  /Age: 2000, 21 years old     Call From: [facility]       Reason for Call:  Transportation     Assessment:   called [employer] ([phone]) to arrange ride. [employer] arranged  for patient from home with a will call return ride.  Patient will need to call when ready for return ride home.      Plan:  Patient is aware of the transportation plan.  available to assist with any other needs.      CARLOS Chavez,[facility]  Hematology/Oncology Social Worker  Phone:[phone] Pager: [phone]
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
## 2022-01-05 NOTE — PROGRESS NOTES
Infusion Nursing Note:  Jennifer Cervantes presents today for add-on infusion  Patient seen by provider today: No   present during visit today: Not Applicable.    Note: Patient received 500 mL bolus D5NaCl and morphine x3 per generic infusion plan. Pain improved and stable upon discharge.      Intravenous Access:  Implanted Port.    Treatment Conditions:  Results reviewed, labs MET treatment parameters, ok to proceed with treatment.      Post Infusion Assessment:  Patient tolerated infusion without incident.       Discharge Plan:   Patient and/or family verbalized understanding of discharge instructions and all questions answered.      Vicenta Boone RN
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
## 2022-01-05 NOTE — TELEPHONE ENCOUNTER
ANTICOAGULATION     Jennifer Cervantes is overdue for INR check.      Writer speaks with Louis from home infusion.  Apparently patient has been refusing INR checks because she has been getting them done in the clinic.  Writer calls and speaks with patient and explains home infusion will check the INR and help with medication set up. Patient does agree with this.  Patient only has 1 more iron infusion and when that is completed then home infusion will no longer see patient.  Another message is sent to Dr. Duncan requesting that home care is ordered.     Gita Khan RN

## 2022-01-06 ENCOUNTER — INFUSION THERAPY VISIT (OUTPATIENT)
Dept: INFUSION THERAPY | Facility: CLINIC | Age: 22
End: 2022-01-06
Attending: PEDIATRICS
Payer: COMMERCIAL

## 2022-01-06 ENCOUNTER — TELEPHONE (OUTPATIENT)
Dept: ONCOLOGY | Facility: CLINIC | Age: 22
End: 2022-01-06
Payer: COMMERCIAL

## 2022-01-06 ENCOUNTER — HOME INFUSION (PRE-WILLOW HOME INFUSION) (OUTPATIENT)
Dept: PHARMACY | Facility: CLINIC | Age: 22
End: 2022-01-06
Payer: COMMERCIAL

## 2022-01-06 ENCOUNTER — PATIENT OUTREACH (OUTPATIENT)
Dept: CARE COORDINATION | Facility: CLINIC | Age: 22
End: 2022-01-06
Payer: COMMERCIAL

## 2022-01-06 VITALS
SYSTOLIC BLOOD PRESSURE: 145 MMHG | HEART RATE: 116 BPM | DIASTOLIC BLOOD PRESSURE: 82 MMHG | RESPIRATION RATE: 18 BRPM | OXYGEN SATURATION: 93 % | TEMPERATURE: 98.2 F

## 2022-01-06 DIAGNOSIS — G81.10 SPASTIC HEMIPLEGIA, UNSPECIFIED ETIOLOGY, UNSPECIFIED LATERALITY (H): Primary | ICD-10-CM

## 2022-01-06 DIAGNOSIS — D57.00 SICKLE CELL PAIN CRISIS (H): ICD-10-CM

## 2022-01-06 PROCEDURE — 258N000003 HC RX IP 258 OP 636: Performed by: PEDIATRICS

## 2022-01-06 PROCEDURE — 96376 TX/PRO/DX INJ SAME DRUG ADON: CPT

## 2022-01-06 PROCEDURE — 96374 THER/PROPH/DIAG INJ IV PUSH: CPT

## 2022-01-06 PROCEDURE — 96361 HYDRATE IV INFUSION ADD-ON: CPT

## 2022-01-06 PROCEDURE — 250N000011 HC RX IP 250 OP 636: Performed by: PEDIATRICS

## 2022-01-06 RX ORDER — HEPARIN SODIUM,PORCINE 10 UNIT/ML
5 VIAL (ML) INTRAVENOUS
Status: CANCELLED | OUTPATIENT
Start: 2022-04-01

## 2022-01-06 RX ORDER — HEPARIN SODIUM (PORCINE) LOCK FLUSH IV SOLN 100 UNIT/ML 100 UNIT/ML
5 SOLUTION INTRAVENOUS
Status: CANCELLED | OUTPATIENT
Start: 2022-04-01

## 2022-01-06 RX ORDER — DIPHENHYDRAMINE HCL 25 MG
25 CAPSULE ORAL
Status: CANCELLED
Start: 2022-04-01

## 2022-01-06 RX ORDER — ONDANSETRON 8 MG/1
8 TABLET, FILM COATED ORAL
Status: CANCELLED
Start: 2022-04-01

## 2022-01-06 RX ORDER — MORPHINE SULFATE 2 MG/ML
2 INJECTION, SOLUTION INTRAMUSCULAR; INTRAVENOUS
Status: CANCELLED
Start: 2022-04-01

## 2022-01-06 RX ORDER — HEPARIN SODIUM (PORCINE) LOCK FLUSH IV SOLN 100 UNIT/ML 100 UNIT/ML
5 SOLUTION INTRAVENOUS
Status: DISCONTINUED | OUTPATIENT
Start: 2022-01-06 | End: 2022-01-06 | Stop reason: HOSPADM

## 2022-01-06 RX ORDER — MORPHINE SULFATE 2 MG/ML
2 INJECTION, SOLUTION INTRAMUSCULAR; INTRAVENOUS
Status: DISCONTINUED | OUTPATIENT
Start: 2022-01-06 | End: 2022-01-06 | Stop reason: HOSPADM

## 2022-01-06 RX ADMIN — DEXTROSE AND SODIUM CHLORIDE 500 ML: 5; 450 INJECTION, SOLUTION INTRAVENOUS at 13:05

## 2022-01-06 RX ADMIN — MORPHINE SULFATE 2 MG: 2 INJECTION, SOLUTION INTRAMUSCULAR; INTRAVENOUS at 14:15

## 2022-01-06 RX ADMIN — MORPHINE SULFATE 2 MG: 2 INJECTION, SOLUTION INTRAMUSCULAR; INTRAVENOUS at 13:10

## 2022-01-06 RX ADMIN — MORPHINE SULFATE 2 MG: 2 INJECTION, SOLUTION INTRAMUSCULAR; INTRAVENOUS at 15:08

## 2022-01-06 RX ADMIN — Medication 5 ML: at 15:17

## 2022-01-06 ASSESSMENT — PAIN SCALES - GENERAL: PAINLEVEL: EXTREME PAIN (9)

## 2022-01-06 NOTE — TELEPHONE ENCOUNTER
Pt called in to triage requesting IVF pain meds for lower back pain rated 9/10 x  days. Stated last took prn oxycodone and ms contin at 6 a.m. this morning without relief. Denied any fevers, chills, cough, sob, chest pain, numbness or tingling or other symptoms not typical of sickle cell pain.   Pt's last infusion was 1/5/22, last clinic visit 12/20/21 with follow-up on 1/21/22 with Andrei Machado, [facility].   Patient states they do not have own ride and it will take 25 minutes long to get to cancer clinic.   Pt denied being out of home medications and needing any refills today.   Pt does not qualify for sickle cell standing order protocol. Infusions on 1/2, 1/3, 1/5 this week. Did not go to ED as directed on 1/4/22. Called [facility]. Ok for pt to come for infusion    If you do not hear from the infusion center by 2pm then you will not be able to get in for an infusion today.     [facility] has opening for pt at 1300.  Jennifer confirmed she can come to the apt.  SW called to arrange ride for pt.  IB sent to scheduling.     Routed to Care Team and Provider

## 2022-01-06 NOTE — PROGRESS NOTES
Infusion Nursing Note:  Jennifer Cervantes presents today for IVF and pain meds.    Patient seen by provider today: No   present during visit today: Not Applicable.    Note:  IVF infused at 250ml/hr   Morphine X 3 doses  Patient declined PRN zofran and benadryl today.    Intravenous Access:  Implanted Port.    Treatment Conditions:  Not Applicable.    Post Infusion Assessment:  Patient tolerated infusion without incident.  Blood return noted pre and post infusion.  Site patent and intact, free from redness, edema or discomfort.  No evidence of extravasations.  Access discontinued per protocol.     Discharge Plan:   Discharge instructions reviewed with: Patient.  Patient and/or family verbalized understanding of discharge instructions and all questions answered.  AVS to patient.  Patient will return PRN for next appointment.   Patient discharged in stable condition accompanied by: self.  Departure Mode: Ambulatory.    Administrations This Visit     dextrose 5% and 0.45% NaCl BOLUS     Admin Date  01/06/2022 Action  New Bag Dose  500 mL Rate  250 mL/hr Route  Intravenous Administered By  Claudia Landry RN          heparin 100 UNIT/ML injection 5 mL     Admin Date  01/06/2022 Action  Given Dose  5 mL Route  Intracatheter Administered By  Claudia Landry RN          morphine (PF) injection 2 mg     Admin Date  01/06/2022 Action  Given Dose  2 mg Route  Intravenous Administered By  Claudia Landry RN           Admin Date  01/06/2022 Action  Given Dose  2 mg Route  Intravenous Administered By  Claudia Landry RN           Admin Date  01/06/2022 Action  Given Dose  2 mg Route  Intravenous Administered By  Claudia Landry RN Julie Backhaus, RN

## 2022-01-06 NOTE — PROGRESS NOTES
Social Work Note: Telephone Call  Oncology Clinic     Data/Intervention:  Patient Name:  Jennifer Cervantes DOB/Age: 2000   Call From: [facility]       Reason for Call:  Transportation     Assessment:   called [employer] to arrange ride through patient's insurance. [employer] Ride arranged  for patient from home with [employer] ([phone]).  Patient will need to call when ready for return ride home.      Plan:  Patient is aware of the transportation plan.  available to assist with any other needs.      CARLOS Chavez,SW  Hematology/Oncology Social Worker  Phone:[phone] Pager: [phone]

## 2022-01-06 NOTE — PATIENT INSTRUCTIONS
Dear Jennifer Cervantes    Thank you for choosing [facility] ([facility]) for your infusion.  The following information is a summary of our appointment as well as important reminders.      We look forward in seeing you on your next appointment here at [facility] ([facility]).  Please don t hesitate to call us at [phone] to reschedule any of your appointments or to speak with one of the [facility] registered nurses.  It was a pleasure taking care of you today.    Sincerely,    [facility]  [facility]  [address] [place] [address]  [address]  Phone:  [phone]

## 2022-01-06 NOTE — LETTER
1/6/2022         RE: Jennifer Cervantes  [address]  [place] [address]        Dear Colleague,    Thank you for referring your patient, Jennifer Cervantes, to the [facility]. Please see a copy of my visit note below.    Infusion Nursing Note:  Jennifer Cervantes presents today for IVF and pain meds.    Patient seen by provider today: No   present during visit today: Not Applicable.    Note:  IVF infused at 250ml/hr   Morphine X 3 doses  Patient declined PRN zofran and benadryl today.    Intravenous Access:  Implanted Port.    Treatment Conditions:  Not Applicable.    Post Infusion Assessment:  Patient tolerated infusion without incident.  Blood return noted pre and post infusion.  Site patent and intact, free from redness, edema or discomfort.  No evidence of extravasations.  Access discontinued per protocol.     Discharge Plan:   Discharge instructions reviewed with: Patient.  Patient and/or family verbalized understanding of discharge instructions and all questions answered.  AVS to patient.  Patient will return PRN for next appointment.   Patient discharged in stable condition accompanied by: self.  Departure Mode: Ambulatory.    Administrations This Visit     dextrose 5% and 0.45% NaCl BOLUS     Admin Date  01/06/2022 Action  New Bag Dose  500 mL Rate  250 mL/hr Route  Intravenous Administered By  Claudia Landry RN          heparin 100 UNIT/ML injection 5 mL     Admin Date  01/06/2022 Action  Given Dose  5 mL Route  Intracatheter Administered By  Claudia Landry RN          morphine (PF) injection 2 mg     Admin Date  01/06/2022 Action  Given Dose  2 mg Route  Intravenous Administered By  Claudia Landry RN           Admin Date  01/06/2022 Action  Given Dose  2 mg Route  Intravenous Administered By  Claudia Landry RN           Admin Date  01/06/2022 Action  Given Dose  2 mg Route  Intravenous Administered By  Claudia Landry RN Julie  JOE Landry          Again, thank you for allowing me to participate in the care of your patient.      Sincerely,    [facility]

## 2022-01-07 ENCOUNTER — PATIENT OUTREACH (OUTPATIENT)
Dept: CARE COORDINATION | Facility: CLINIC | Age: 22
End: 2022-01-07
Payer: COMMERCIAL

## 2022-01-07 ENCOUNTER — HOME INFUSION (PRE-WILLOW HOME INFUSION) (OUTPATIENT)
Dept: PHARMACY | Facility: CLINIC | Age: 22
End: 2022-01-07
Payer: COMMERCIAL

## 2022-01-07 ENCOUNTER — NURSE TRIAGE (OUTPATIENT)
Dept: ONCOLOGY | Facility: CLINIC | Age: 22
End: 2022-01-07
Payer: COMMERCIAL

## 2022-01-07 ENCOUNTER — INFUSION THERAPY VISIT (OUTPATIENT)
Dept: INFUSION THERAPY | Facility: CLINIC | Age: 22
End: 2022-01-07
Attending: PEDIATRICS
Payer: COMMERCIAL

## 2022-01-07 VITALS
OXYGEN SATURATION: 94 % | DIASTOLIC BLOOD PRESSURE: 63 MMHG | TEMPERATURE: 98.5 F | SYSTOLIC BLOOD PRESSURE: 122 MMHG | HEART RATE: 116 BPM | RESPIRATION RATE: 20 BRPM

## 2022-01-07 DIAGNOSIS — D57.00 SICKLE CELL PAIN CRISIS (H): ICD-10-CM

## 2022-01-07 DIAGNOSIS — G81.10 SPASTIC HEMIPLEGIA, UNSPECIFIED ETIOLOGY, UNSPECIFIED LATERALITY (H): Primary | ICD-10-CM

## 2022-01-07 PROCEDURE — 96361 HYDRATE IV INFUSION ADD-ON: CPT

## 2022-01-07 PROCEDURE — 96376 TX/PRO/DX INJ SAME DRUG ADON: CPT

## 2022-01-07 PROCEDURE — 258N000003 HC RX IP 258 OP 636: Performed by: PEDIATRICS

## 2022-01-07 PROCEDURE — 96374 THER/PROPH/DIAG INJ IV PUSH: CPT

## 2022-01-07 PROCEDURE — 250N000011 HC RX IP 250 OP 636: Performed by: PEDIATRICS

## 2022-01-07 RX ORDER — DIPHENHYDRAMINE HCL 25 MG
25 CAPSULE ORAL
Status: CANCELLED
Start: 2022-04-01

## 2022-01-07 RX ORDER — MORPHINE SULFATE 2 MG/ML
2 INJECTION, SOLUTION INTRAMUSCULAR; INTRAVENOUS
Status: CANCELLED
Start: 2022-04-01

## 2022-01-07 RX ORDER — HEPARIN SODIUM,PORCINE 10 UNIT/ML
5 VIAL (ML) INTRAVENOUS
Status: DISCONTINUED | OUTPATIENT
Start: 2022-01-07 | End: 2022-01-07 | Stop reason: HOSPADM

## 2022-01-07 RX ORDER — HEPARIN SODIUM (PORCINE) LOCK FLUSH IV SOLN 100 UNIT/ML 100 UNIT/ML
5 SOLUTION INTRAVENOUS
Status: DISCONTINUED | OUTPATIENT
Start: 2022-01-07 | End: 2022-01-07 | Stop reason: HOSPADM

## 2022-01-07 RX ORDER — HEPARIN SODIUM (PORCINE) LOCK FLUSH IV SOLN 100 UNIT/ML 100 UNIT/ML
5 SOLUTION INTRAVENOUS
Status: CANCELLED | OUTPATIENT
Start: 2022-04-01

## 2022-01-07 RX ORDER — HEPARIN SODIUM,PORCINE 10 UNIT/ML
5 VIAL (ML) INTRAVENOUS
Status: CANCELLED | OUTPATIENT
Start: 2022-04-01

## 2022-01-07 RX ORDER — ONDANSETRON 8 MG/1
8 TABLET, FILM COATED ORAL
Status: CANCELLED
Start: 2022-04-01

## 2022-01-07 RX ORDER — MORPHINE SULFATE 2 MG/ML
2 INJECTION, SOLUTION INTRAMUSCULAR; INTRAVENOUS
Status: COMPLETED | OUTPATIENT
Start: 2022-01-07 | End: 2022-01-07

## 2022-01-07 RX ADMIN — Medication 5 ML: at 14:55

## 2022-01-07 RX ADMIN — MORPHINE SULFATE 2 MG: 2 INJECTION, SOLUTION INTRAMUSCULAR; INTRAVENOUS at 13:37

## 2022-01-07 RX ADMIN — MORPHINE SULFATE 2 MG: 2 INJECTION, SOLUTION INTRAMUSCULAR; INTRAVENOUS at 11:32

## 2022-01-07 RX ADMIN — MORPHINE SULFATE 2 MG: 2 INJECTION, SOLUTION INTRAMUSCULAR; INTRAVENOUS at 12:36

## 2022-01-07 RX ADMIN — DEXTROSE AND SODIUM CHLORIDE 1000 ML: 5; 450 INJECTION, SOLUTION INTRAVENOUS at 11:26

## 2022-01-07 NOTE — PROGRESS NOTES
Nursing Note  Jennifer Cervantes [facility] for:   Chief Complaint   Patient presents with     Infusion     IVF, pain meds     During today's [facility] appointment, orders from Dr. Duncan were completed.  Frequency: as needed    Progress note:  Patient identification verified by name and date of birth.  Assessment completed.  Vitals recorded in Doc Flowsheets.  Patient was provided with education regarding medication/procedure and possible side effects.  Patient verbalized understanding.     present during visit today: Not Applicable.    Treatment Conditions: Non-applicable.    Premedications: were not ordered.    Drug Waste Record: No    Infusion length and rate:  infusion given over approximately 2.5 hours After ordered dose of 500 ml finished, patient requesting to finish the rest of the 1 liter bag. Dr. Duncan paged x 2 with no response. RONALDO Allan ok'd patient to get the remainder of fluids (500 ml) if patient not experiencing any shortness of breath or swelling of lower extremities. None noted, IV 1 liter bag completed without difficulty.     Labs: were not ordered for this appointment.    Vascular access: port accessed today.    Is the next appt scheduled? prn  Asymptomatic COVID test completed? no    Post Infusion Assessment:  Patient tolerated infusion without incident.     Discharge Plan:   Follow up plan of care with: ordering provider as scheduled.  Discharge instructions were reviewed with patient.  Patient/representative verbalized understanding of discharge instructions and all questions answered.  Patient discharged from [facility] in stable condition.    Yina Ford RN    Administrations This Visit     dextrose 5% and 0.45% NaCl BOLUS     Admin Date  01/07/2022 Action  New Bag Dose  1,000 mL Rate  250 mL/hr Route  Intravenous Administered By  Yina Ford, JOE           heparin 100 UNIT/ML injection 5 mL     Admin Date  01/07/2022 Action  Given Dose  5 mL Route  Intracatheter Administered By  Yina Ford RN          morphine (PF) injection 2 mg     Admin Date  01/07/2022 Action  Given Dose  2 mg Route  Intravenous Administered By  Yina Ford RN           Admin Date  01/07/2022 Action  Given Dose  2 mg Route  Intravenous Administered By  Yina Ford RN           Admin Date  01/07/2022 Action  Given Dose  2 mg Route  Intravenous Administered By  Yina Ford, JOE                /70 (BP Location: Right arm, Patient Position: Semi-Short's)   Pulse 110   Temp 98.5  F (36.9  C) (Oral)   Resp 20   SpO2 94%

## 2022-01-07 NOTE — TELEPHONE ENCOUNTER
[facility] Triage    Incoming Call:    Pt called in to triage requesting IVF pain meds for lower back pain rated 9/10 x 2 days. Stated last took MS CONTIN and oxy this morning without relief. Denied any fevers, chills, cough, sob, chest pain or other symptoms.  Assess for confusion, numbness, paralysis, vomiting, headache, vision issues, difficulty walking and/or talking. Pt's last infusion was yesterday, last clinic visit 12/20 Perla with follow-up on 1/21/22 with Jeannette. Patient states they do not have own ride and it will take 60 long to get to cancer clinic. Pt denied being out of home medications and needing any refills today. Pt does qualify for sickle cell standing order protocol as she had infusions 1/3, 1/5, and 1/6 and they were not 3 consecutive days.    Placed on waiting list.    [facility] 1130 today.  Scheduling notified and SW notified ride is needed.    Routing to Perla and Amanda Roth

## 2022-01-07 NOTE — LETTER
1/7/2022      RE: Jennifer Cervantes  [address]  [place] [address]      Dear Colleague,    Thank you for referring your patient, Jennifer Cervantes, to the [facility]. Please see a copy of my visit note below.    Nursing Note  Jennifer Cervantes [facility] for:   Chief Complaint   Patient presents with     Infusion     IVF, pain meds     During today's [facility] appointment, orders from Dr. Duncan were completed.  Frequency: as needed    Progress note:  Patient identification verified by name and date of birth.  Assessment completed.  Vitals recorded in Doc Flowsheets.  Patient was provided with education regarding medication/procedure and possible side effects.  Patient verbalized understanding.     present during visit today: Not Applicable.    Treatment Conditions: Non-applicable.    Premedications: were not ordered.    Drug Waste Record: No    Infusion length and rate:  infusion given over approximately 2.5 hours After ordered dose of 500 ml finished, patient requesting to finish the rest of the 1 liter bag. Dr. Duncan paged x 2 with no response. RONALDO Allan ok'd patient to get the remainder of fluids (500 ml) if patient not experiencing any shortness of breath or swelling of lower extremities. None noted, IV 1 liter bag completed without difficulty.     Labs: were not ordered for this appointment.    Vascular access: port accessed today.    Is the next appt scheduled? prn  Asymptomatic COVID test completed? no    Post Infusion Assessment:  Patient tolerated infusion without incident.     Discharge Plan:   Follow up plan of care with: ordering provider as scheduled.  Discharge instructions were reviewed with patient.  Patient/representative verbalized understanding of discharge instructions and all questions answered.  Patient discharged from [facility] in stable condition.    Yina Ford, JOE    Administrations This Visit     dextrose 5% and 0.45% NaCl BOLUS     Admin Date  01/07/2022 Action  New Bag Dose  1,000 mL Rate  250 mL/hr Route  Intravenous Administered By  Yina Ford, JOE          heparin 100 UNIT/ML injection 5 mL     Admin Date  01/07/2022 Action  Given Dose  5 mL Route  Intracatheter Administered By  Yina Ford, RN          morphine (PF) injection 2 mg     Admin Date  01/07/2022 Action  Given Dose  2 mg Route  Intravenous Administered By  Yina Ford, JOE           Admin Date  01/07/2022 Action  Given Dose  2 mg Route  Intravenous Administered By  Yina Ford RN           Admin Date  01/07/2022 Action  Given Dose  2 mg Route  Intravenous Administered By  Yina Ford, JOE              /70 (BP Location: Right arm, Patient Position: Semi-Short's)   Pulse 110   Temp 98.5  F (36.9  C) (Oral)   Resp 20   SpO2 94%         Again, thank you for allowing me to participate in the care of your patient.      Sincerely,    [facility]

## 2022-01-07 NOTE — PROGRESS NOTES
Social Work Note: Telephone Call  Oncology Clinic     Data/Intervention:  Patient Name:  Jennifer Cervantes DOB/Age: 2000,      Call From: [facility]riage        Reason for Call:  Transportation     Assessment:   called [employer] ([phone]) to arrange ride through patient's insurance. [employer] Ride arranged  for patient from home with [employer] ([phone]).  Patient will need to call when ready for return ride home. SW left a VM for patient about ride details and encouraged call back for any issues with their ride.      Plan:  Patient is aware of the transportation plan.  available to assist with any other needs.      CARLOS Chavez,LGSW  Hematology/Oncology Social Worker  Phone:[phone] Pager: [phone]

## 2022-01-08 ENCOUNTER — HEALTH MAINTENANCE LETTER (OUTPATIENT)
Age: 22
End: 2022-01-08

## 2022-01-08 ENCOUNTER — HOSPITAL ENCOUNTER (EMERGENCY)
Facility: CLINIC | Age: 22
Discharge: HOME | End: 2022-01-09
Attending: EMERGENCY MEDICINE | Admitting: EMERGENCY MEDICINE
Payer: COMMERCIAL

## 2022-01-08 VITALS
WEIGHT: 170 LBS | RESPIRATION RATE: 20 BRPM | HEIGHT: 64 IN | DIASTOLIC BLOOD PRESSURE: 80 MMHG | HEART RATE: 116 BPM | OXYGEN SATURATION: 91 % | BODY MASS INDEX: 29.02 KG/M2 | TEMPERATURE: 99.1 F | SYSTOLIC BLOOD PRESSURE: 129 MMHG

## 2022-01-08 DIAGNOSIS — D57.00 SICKLE CELL PAIN CRISIS (H): ICD-10-CM

## 2022-01-08 LAB
ALBUMIN SERPL-MCNC: 4.2 G/DL (ref 3.4–5)
ALP SERPL-CCNC: 72 U/L (ref 40–150)
ALT SERPL W P-5'-P-CCNC: 66 U/L (ref 0–50)
ANION GAP SERPL CALCULATED.3IONS-SCNC: 8 MMOL/L (ref 3–14)
AST SERPL W P-5'-P-CCNC: 90 U/L (ref 0–45)
BASOPHILS # BLD AUTO: 0.1 10E3/UL (ref 0–0.2)
BASOPHILS NFR BLD AUTO: 1 %
BILIRUB SERPL-MCNC: 4.9 MG/DL (ref 0.2–1.3)
BUN SERPL-MCNC: 10 MG/DL (ref 7–30)
CALCIUM SERPL-MCNC: 8.9 MG/DL (ref 8.5–10.1)
CHLORIDE BLD-SCNC: 108 MMOL/L (ref 94–109)
CO2 SERPL-SCNC: 19 MMOL/L (ref 20–32)
CREAT SERPL-MCNC: 0.54 MG/DL (ref 0.52–1.04)
EOSINOPHIL # BLD AUTO: 0.2 10E3/UL (ref 0–0.7)
EOSINOPHIL NFR BLD AUTO: 1 %
ERYTHROCYTE [DISTWIDTH] IN BLOOD BY AUTOMATED COUNT: 20.9 % (ref 10–15)
GFR SERPL CREATININE-BSD FRML MDRD: >90 ML/MIN/1.73M2
GLUCOSE BLD-MCNC: 86 MG/DL (ref 70–99)
HCG SERPL QL: NEGATIVE
HCT VFR BLD AUTO: 19.1 % (ref 35–47)
HGB BLD-MCNC: 6.8 G/DL (ref 11.7–15.7)
HOLD SPECIMEN: NORMAL
IMM GRANULOCYTES # BLD: 0.1 10E3/UL
IMM GRANULOCYTES NFR BLD: 1 %
LYMPHOCYTES # BLD AUTO: 1.8 10E3/UL (ref 0.8–5.3)
LYMPHOCYTES NFR BLD AUTO: 13 %
MCH RBC QN AUTO: 31.2 PG (ref 26.5–33)
MCHC RBC AUTO-ENTMCNC: 35.6 G/DL (ref 31.5–36.5)
MCV RBC AUTO: 88 FL (ref 78–100)
MONOCYTES # BLD AUTO: 0.9 10E3/UL (ref 0–1.3)
MONOCYTES NFR BLD AUTO: 7 %
NEUTROPHILS # BLD AUTO: 10.5 10E3/UL (ref 1.6–8.3)
NEUTROPHILS NFR BLD AUTO: 77 %
NRBC # BLD AUTO: 0.5 10E3/UL
NRBC BLD AUTO-RTO: 4 /100
PLATELET # BLD AUTO: 301 10E3/UL (ref 150–450)
POTASSIUM BLD-SCNC: 3.5 MMOL/L (ref 3.4–5.3)
PROT SERPL-MCNC: 8.1 G/DL (ref 6.8–8.8)
RBC # BLD AUTO: 2.18 10E6/UL (ref 3.8–5.2)
RETICS # AUTO: 0.38 10E6/UL (ref 0.03–0.1)
RETICS/RBC NFR AUTO: 17.6 % (ref 0.5–2)
SODIUM SERPL-SCNC: 135 MMOL/L (ref 133–144)
WBC # BLD AUTO: 13.5 10E3/UL (ref 4–11)

## 2022-01-08 PROCEDURE — 96375 TX/PRO/DX INJ NEW DRUG ADDON: CPT

## 2022-01-08 PROCEDURE — 84703 CHORIONIC GONADOTROPIN ASSAY: CPT | Performed by: EMERGENCY MEDICINE

## 2022-01-08 PROCEDURE — 250N000009 HC RX 250: Performed by: EMERGENCY MEDICINE

## 2022-01-08 PROCEDURE — 96361 HYDRATE IV INFUSION ADD-ON: CPT

## 2022-01-08 PROCEDURE — 250N000013 HC RX MED GY IP 250 OP 250 PS 637: Performed by: EMERGENCY MEDICINE

## 2022-01-08 PROCEDURE — 82947 ASSAY GLUCOSE BLOOD QUANT: CPT | Performed by: EMERGENCY MEDICINE

## 2022-01-08 PROCEDURE — 96374 THER/PROPH/DIAG INJ IV PUSH: CPT

## 2022-01-08 PROCEDURE — 258N000003 HC RX IP 258 OP 636: Performed by: EMERGENCY MEDICINE

## 2022-01-08 PROCEDURE — 99285 EMERGENCY DEPT VISIT HI MDM: CPT | Performed by: EMERGENCY MEDICINE

## 2022-01-08 PROCEDURE — 84155 ASSAY OF PROTEIN SERUM: CPT | Performed by: EMERGENCY MEDICINE

## 2022-01-08 PROCEDURE — 96376 TX/PRO/DX INJ SAME DRUG ADON: CPT

## 2022-01-08 PROCEDURE — 82374 ASSAY BLOOD CARBON DIOXIDE: CPT | Performed by: EMERGENCY MEDICINE

## 2022-01-08 PROCEDURE — 85025 COMPLETE CBC W/AUTO DIFF WBC: CPT | Performed by: EMERGENCY MEDICINE

## 2022-01-08 PROCEDURE — 36415 COLL VENOUS BLD VENIPUNCTURE: CPT | Performed by: EMERGENCY MEDICINE

## 2022-01-08 PROCEDURE — 250N000011 HC RX IP 250 OP 636: Performed by: EMERGENCY MEDICINE

## 2022-01-08 PROCEDURE — 99285 EMERGENCY DEPT VISIT HI MDM: CPT | Mod: 25

## 2022-01-08 PROCEDURE — 85045 AUTOMATED RETICULOCYTE COUNT: CPT | Performed by: EMERGENCY MEDICINE

## 2022-01-08 RX ORDER — ONDANSETRON 2 MG/ML
8 INJECTION INTRAMUSCULAR; INTRAVENOUS
Status: COMPLETED | OUTPATIENT
Start: 2022-01-08 | End: 2022-01-08

## 2022-01-08 RX ORDER — KETOROLAC TROMETHAMINE 15 MG/ML
15 INJECTION, SOLUTION INTRAMUSCULAR; INTRAVENOUS ONCE
Status: COMPLETED | OUTPATIENT
Start: 2022-01-08 | End: 2022-01-08

## 2022-01-08 RX ORDER — SODIUM CHLORIDE, SODIUM LACTATE, POTASSIUM CHLORIDE, CALCIUM CHLORIDE 600; 310; 30; 20 MG/100ML; MG/100ML; MG/100ML; MG/100ML
INJECTION, SOLUTION INTRAVENOUS ONCE
Status: COMPLETED | OUTPATIENT
Start: 2022-01-08 | End: 2022-01-08

## 2022-01-08 RX ORDER — OXYCODONE HYDROCHLORIDE 10 MG/1
20 TABLET ORAL ONCE
Status: COMPLETED | OUTPATIENT
Start: 2022-01-08 | End: 2022-01-08

## 2022-01-08 RX ORDER — HEPARIN SODIUM (PORCINE) LOCK FLUSH IV SOLN 100 UNIT/ML 100 UNIT/ML
5-10 SOLUTION INTRAVENOUS
Status: DISCONTINUED | OUTPATIENT
Start: 2022-01-09 | End: 2022-01-09 | Stop reason: HOSPADM

## 2022-01-08 RX ADMIN — ONDANSETRON 8 MG: 2 INJECTION INTRAMUSCULAR; INTRAVENOUS at 20:10

## 2022-01-08 RX ADMIN — OXYCODONE HYDROCHLORIDE 15 MG: 5 TABLET ORAL at 19:59

## 2022-01-08 RX ADMIN — Medication 5 MG: at 21:03

## 2022-01-08 RX ADMIN — KETOROLAC TROMETHAMINE 15 MG: 15 INJECTION, SOLUTION INTRAMUSCULAR; INTRAVENOUS at 20:10

## 2022-01-08 RX ADMIN — OXYCODONE HYDROCHLORIDE 20 MG: 10 TABLET ORAL at 22:38

## 2022-01-08 RX ADMIN — Medication 5 MG: at 23:59

## 2022-01-08 RX ADMIN — SODIUM CHLORIDE, POTASSIUM CHLORIDE, SODIUM LACTATE AND CALCIUM CHLORIDE: 600; 310; 30; 20 INJECTION, SOLUTION INTRAVENOUS at 20:09

## 2022-01-08 ASSESSMENT — MIFFLIN-ST. JEOR: SCORE: 1516.11

## 2022-01-09 PROCEDURE — 250N000011 HC RX IP 250 OP 636: Performed by: EMERGENCY MEDICINE

## 2022-01-09 RX ADMIN — HEPARIN 5 ML: 100 SYRINGE at 00:49

## 2022-01-09 NOTE — ED PROVIDER NOTES
"    [place] EMERGENCY DEPARTMENT ([facility])  1/08/22  History     Chief Complaint   Patient presents with     Sickle Cell Pain Crisis     HPI  Jennifer Cervantes is a 21 year old female with a past medical history significant for sickle cell anemia, cerebral infarction resulting in right sided hemiplegia and hemiparesis, PE anticoagulated on warfarin, and superficial vein thrombosis of right arm who presents to the Emergency Department for evaluation of sickle cell pain crisis.  Patient notes allover body pain that began approximately 2 hours ago.  She states that she was out in the cold and this likely precipitated her symptoms.  Patient states this is similar to previous sickle cell pain crises.  No other associated symptoms with this.  No recent illness or any other precipitating factors.  No other symptoms noted.            I have reviewed the Medications, Allergies, Past Medical and Surgical History, and Social History in the Epic system.    Review of Systems  A complete review of systems was performed with pertinent positives and negatives noted in the HPI, and all other systems negative.    Physical Exam   BP: 129/80  Pulse: 116  Temp: 99.1  F (37.3  C)  Resp: 20  Height: 162.6 cm (5' 4\")  Weight: 77.1 kg (170 lb)  SpO2: 97 %      Physical Exam  Vitals and nursing note reviewed.   Constitutional:       General: She is not in acute distress.     Appearance: She is well-developed. She is not diaphoretic.   HENT:      Head: Normocephalic and atraumatic.      Mouth/Throat:      Pharynx: No oropharyngeal exudate.   Eyes:      General: No scleral icterus.        Right eye: No discharge.         Left eye: No discharge.      Pupils: Pupils are equal, round, and reactive to light.   Cardiovascular:      Rate and Rhythm: Regular rhythm. Tachycardia present.      Heart sounds: Normal heart sounds. No murmur heard.  No friction rub. No gallop.       Comments: Port in right upper chest.  Pulmonary:      Effort: " Pulmonary effort is normal. No respiratory distress.      Breath sounds: Normal breath sounds. No wheezing.   Chest:      Chest wall: No tenderness.   Abdominal:      General: Bowel sounds are normal. There is no distension.      Palpations: Abdomen is soft.      Tenderness: There is no abdominal tenderness.   Musculoskeletal:         General: No tenderness or deformity. Normal range of motion.      Cervical back: Normal range of motion and neck supple.   Skin:     General: Skin is warm and dry.      Coloration: Skin is not pale.      Findings: No erythema or rash.   Neurological:      Mental Status: She is alert and oriented to person, place, and time.      Cranial Nerves: No cranial nerve deficit.         ED Course     At 7:39 PM the patient was seen and examined by Jitendra Brandon MD in Room ED 23.        Procedures                No results found. However, due to the size of the patient record, not all encounters were searched. Please check Results Review for a complete set of results.  Medications   lactated ringers infusion (has no administration in time range)   oxyCODONE (ROXICODONE) tablet 15 mg (has no administration in time range)   ketorolac (TORADOL) injection 15 mg (has no administration in time range)   ondansetron (ZOFRAN) injection 8 mg (has no administration in time range)             Assessments & Plan (with Medical Decision Making)   There is a 21-year-old female with a history of sickle cell who presents with generalized body pain.  This is consistent with previous episodes of sickle cell pain crisis.  She believes this was precipitated by cold exposure.  Exam demonstrates slight tachycardia.  Lab work shows a hemoglobin of 6.8. this is similar to previous values and we will hold transfusion due to risk of alloimmunization and iron overload.  I discussed all results with patient.  Per care plan patient was given oxycodone, LR, and ketorolac.  Patient has had relief with ketamine in the past  this was also ordered.  On reexamination patient is feeling improved.  Patient will be discharged with return precautions.    I have reviewed the nursing notes.    I have reviewed the findings, diagnosis, plan and need for follow up with the patient.    New Prescriptions    No medications on file       Final diagnoses:   None       IAshley am serving as a trained medical scribe to document services personally performed by Jitendra Brandon MD, based on the provider's statements to me.      I, Jitendra Brandon MD, was physically present and have reviewed and verified the accuracy of this note documented by Ashley Garcia.     Jitendra Brandon MD  1/8/2022   [facility] EMERGENCY DEPARTMENT     Jitendra Brandon DO  01/09/22 0227

## 2022-01-09 NOTE — ED TRIAGE NOTES
Pt presents with generalized body pain related to sickle cell crisis x 2 hrs.  Took MS contin at 6:30 pm with no relief.  Denies fever, chest pain or sob.

## 2022-01-10 ENCOUNTER — TELEPHONE (OUTPATIENT)
Dept: ONCOLOGY | Facility: CLINIC | Age: 22
End: 2022-01-10
Payer: COMMERCIAL

## 2022-01-10 ENCOUNTER — INFUSION THERAPY VISIT (OUTPATIENT)
Dept: INFUSION THERAPY | Facility: CLINIC | Age: 22
End: 2022-01-10
Attending: PEDIATRICS
Payer: COMMERCIAL

## 2022-01-10 ENCOUNTER — ANTICOAGULATION THERAPY VISIT (OUTPATIENT)
Dept: ANTICOAGULATION | Facility: CLINIC | Age: 22
End: 2022-01-10

## 2022-01-10 ENCOUNTER — DOCUMENTATION ONLY (OUTPATIENT)
Dept: ANTICOAGULATION | Facility: CLINIC | Age: 22
End: 2022-01-10
Payer: COMMERCIAL

## 2022-01-10 ENCOUNTER — PATIENT OUTREACH (OUTPATIENT)
Dept: CARE COORDINATION | Facility: CLINIC | Age: 22
End: 2022-01-10
Payer: COMMERCIAL

## 2022-01-10 VITALS
SYSTOLIC BLOOD PRESSURE: 125 MMHG | OXYGEN SATURATION: 91 % | RESPIRATION RATE: 16 BRPM | HEART RATE: 102 BPM | DIASTOLIC BLOOD PRESSURE: 78 MMHG

## 2022-01-10 DIAGNOSIS — D57.00 SICKLE CELL PAIN CRISIS (H): ICD-10-CM

## 2022-01-10 DIAGNOSIS — G81.10 SPASTIC HEMIPLEGIA, UNSPECIFIED ETIOLOGY, UNSPECIFIED LATERALITY (H): ICD-10-CM

## 2022-01-10 DIAGNOSIS — D57.00 SICKLE CELL CRISIS (H): ICD-10-CM

## 2022-01-10 DIAGNOSIS — I27.82 CHRONIC PULMONARY EMBOLISM WITHOUT ACUTE COR PULMONALE, UNSPECIFIED PULMONARY EMBOLISM TYPE (H): Primary | ICD-10-CM

## 2022-01-10 DIAGNOSIS — I26.99 PULMONARY EMBOLISM, OTHER, UNSPECIFIED CHRONICITY, UNSPECIFIED WHETHER ACUTE COR PULMONALE PRESENT (H): Primary | ICD-10-CM

## 2022-01-10 LAB
BASOPHILS # BLD MANUAL: 0.1 10E3/UL (ref 0–0.2)
BASOPHILS NFR BLD MANUAL: 1 %
EOSINOPHIL # BLD MANUAL: 0.2 10E3/UL (ref 0–0.7)
EOSINOPHIL NFR BLD MANUAL: 3 %
ERYTHROCYTE [DISTWIDTH] IN BLOOD BY AUTOMATED COUNT: 21 % (ref 10–15)
FRAGMENTS BLD QL SMEAR: ABNORMAL
HCT VFR BLD AUTO: 17.5 % (ref 35–47)
HGB BLD-MCNC: 6.3 G/DL (ref 11.7–15.7)
HGB C CRYSTALS: PRESENT
INR PPP: 1.24 (ref 0.85–1.15)
LYMPHOCYTES # BLD MANUAL: 2.3 10E3/UL (ref 0.8–5.3)
LYMPHOCYTES NFR BLD MANUAL: 28 %
MCH RBC QN AUTO: 30 PG (ref 26.5–33)
MCHC RBC AUTO-ENTMCNC: 36 G/DL (ref 31.5–36.5)
MCV RBC AUTO: 83 FL (ref 78–100)
METAMYELOCYTES # BLD MANUAL: 0.1 10E3/UL
METAMYELOCYTES NFR BLD MANUAL: 1 %
MONOCYTES # BLD MANUAL: 0.6 10E3/UL (ref 0–1.3)
MONOCYTES NFR BLD MANUAL: 7 %
MYELOCYTES # BLD MANUAL: 0.1 10E3/UL
MYELOCYTES NFR BLD MANUAL: 1 %
NEUTROPHILS # BLD MANUAL: 4.8 10E3/UL (ref 1.6–8.3)
NEUTROPHILS NFR BLD MANUAL: 59 %
NRBC # BLD AUTO: 6.5 10E3/UL
NRBC BLD MANUAL-RTO: 80 %
PLAT MORPH BLD: ABNORMAL
PLATELET # BLD AUTO: 346 10E3/UL (ref 150–450)
RBC # BLD AUTO: 2.1 10E6/UL (ref 3.8–5.2)
RBC MORPH BLD: ABNORMAL
RETICS # AUTO: 0.35 10E6/UL (ref 0.03–0.1)
RETICS/RBC NFR AUTO: 17.3 % (ref 0.5–2)
TARGETS BLD QL SMEAR: SLIGHT
WBC # BLD AUTO: 8.1 10E3/UL (ref 4–11)

## 2022-01-10 PROCEDURE — 36591 DRAW BLOOD OFF VENOUS DEVICE: CPT

## 2022-01-10 PROCEDURE — 96361 HYDRATE IV INFUSION ADD-ON: CPT

## 2022-01-10 PROCEDURE — 258N000003 HC RX IP 258 OP 636: Performed by: PEDIATRICS

## 2022-01-10 PROCEDURE — 85045 AUTOMATED RETICULOCYTE COUNT: CPT

## 2022-01-10 PROCEDURE — 85027 COMPLETE CBC AUTOMATED: CPT

## 2022-01-10 PROCEDURE — 96374 THER/PROPH/DIAG INJ IV PUSH: CPT

## 2022-01-10 PROCEDURE — 85610 PROTHROMBIN TIME: CPT

## 2022-01-10 PROCEDURE — 96376 TX/PRO/DX INJ SAME DRUG ADON: CPT

## 2022-01-10 PROCEDURE — 250N000011 HC RX IP 250 OP 636: Performed by: PEDIATRICS

## 2022-01-10 RX ORDER — MORPHINE SULFATE 2 MG/ML
2 INJECTION, SOLUTION INTRAMUSCULAR; INTRAVENOUS
Status: DISCONTINUED | OUTPATIENT
Start: 2022-01-10 | End: 2022-01-10 | Stop reason: HOSPADM

## 2022-01-10 RX ORDER — HEPARIN SODIUM (PORCINE) LOCK FLUSH IV SOLN 100 UNIT/ML 100 UNIT/ML
5 SOLUTION INTRAVENOUS
Status: CANCELLED | OUTPATIENT
Start: 2022-04-01

## 2022-01-10 RX ORDER — ONDANSETRON 8 MG/1
8 TABLET, FILM COATED ORAL
Status: CANCELLED
Start: 2022-04-01

## 2022-01-10 RX ORDER — HEPARIN SODIUM,PORCINE 10 UNIT/ML
5 VIAL (ML) INTRAVENOUS
Status: CANCELLED | OUTPATIENT
Start: 2022-04-01

## 2022-01-10 RX ORDER — MORPHINE SULFATE 2 MG/ML
2 INJECTION, SOLUTION INTRAMUSCULAR; INTRAVENOUS
Status: CANCELLED
Start: 2022-04-01

## 2022-01-10 RX ORDER — DIPHENHYDRAMINE HCL 25 MG
25 CAPSULE ORAL
Status: CANCELLED
Start: 2022-04-01

## 2022-01-10 RX ORDER — HEPARIN SODIUM (PORCINE) LOCK FLUSH IV SOLN 100 UNIT/ML 100 UNIT/ML
5 SOLUTION INTRAVENOUS
Status: DISCONTINUED | OUTPATIENT
Start: 2022-01-10 | End: 2022-01-10 | Stop reason: HOSPADM

## 2022-01-10 RX ADMIN — MORPHINE SULFATE 2 MG: 2 INJECTION, SOLUTION INTRAMUSCULAR; INTRAVENOUS at 13:14

## 2022-01-10 RX ADMIN — DEXTROSE AND SODIUM CHLORIDE 500 ML: 5; 450 INJECTION, SOLUTION INTRAVENOUS at 12:18

## 2022-01-10 RX ADMIN — MORPHINE SULFATE 2 MG: 2 INJECTION, SOLUTION INTRAMUSCULAR; INTRAVENOUS at 12:19

## 2022-01-10 RX ADMIN — MORPHINE SULFATE 2 MG: 2 INJECTION, SOLUTION INTRAMUSCULAR; INTRAVENOUS at 14:20

## 2022-01-10 RX ADMIN — HEPARIN 5 ML: 100 SYRINGE at 15:28

## 2022-01-10 NOTE — PROGRESS NOTES
Infusion Nursing Note:  Jennifer Cervantes presents today for Fluids, pain meds.    Patient seen by provider today: No   present during visit today: Not Applicable.    Note: Fluid infused over 2 hours.  IV morphine given x 3.      Intravenous Access:  Implanted Port.    Treatment Conditions:  Not Applicable.      Post Infusion Assessment:  Patient tolerated infusion without incident.  No evidence of extravasations.  Access discontinued per protocol.       Discharge Plan:   Patient discharged in stable condition accompanied by: self.      PRASANNA TRONCOSO RN

## 2022-01-10 NOTE — PROGRESS NOTES
ANTICOAGULATION  MANAGEMENT: Discharge Review    Jennifer Cervantes chart reviewed for anticoagulation continuity of care    Emergency room visit on 1/8/22 for Sickle Cell Pain Crisis.    Discharge disposition: Home    Results:    No results for input(s): INR, [id number], F2, ALMWH, AAUFH in the last 168 hours.  Anticoagulation inpatient management:     not applicable     Anticoagulation discharge instructions:     Warfarin dosing: Not Applicable    Bridging: No   INR goal change: No      Medication changes affecting anticoagulation: No    Additional factors affecting anticoagulation: No    ER visit consistent with previous episodes of sickle cell pain crisis.    Hgb 6.8 similar to previous value. Transfusions due to risk of alloimmunication and and iron overload.    Given oxycodone, LR, Ketorolac, and Ketamine. Reexamination pt. Feeling improved.    Plan     No adjustment to anticoagulation plan needed    Patient not contacted    No adjustment to Anticoagulation Calendar was required    Magy Roper RN

## 2022-01-10 NOTE — CONFIDENTIAL NOTE
Pt called in to triage requesting IVF pain meds for back and all over pain rated 9/10 x 2-3 days. Stated last took prn MS contin, oxycodone at 6 am this morning without relief. Denied any fevers, chills, cough, sob, chest pain, numbness or tingling or other symptoms not typical of sickle cell pain.   Pt's last infusion was 1/7/22, last clinic visit 12/20/21 with follow-up on 1/21/22 with Andrei HIGGINS.   Patient states they do not have own ride and it will take 60 minutes long to get to cancer clinic.   Pt denied being out of home medications and needing any refills today.     If you do not hear from the infusion center by 2pm then you will not be able to get in for an infusion today.   Please note, if you are late for your appt, you risk losing your infusion appt as it may delay another patient's infusion who arrived on time.   7:30 messaged RNCC to see if OK for IVF/Pain  7:40 OK by RNCC for Jennifer to come in for IVF/pain.     [facility] has a noon availability     Call placed to Jennifer and she will take the noon appointment at [facility].     Message/page sent to  to assist in transportation.  They are working on setting up her ride.     Charge Nurse at [facility] notified that Jennifer is confirmed for noon appointment.

## 2022-01-10 NOTE — PROGRESS NOTES
ANTICOAGULATION MANAGEMENT     Jennifer Cervantes 21 year old female is on warfarin with subtherapeutic INR result. (Goal INR 2.0-3.0)    Recent labs: (last 7 days)     01/10/22  1201   INR 1.24*       ASSESSMENT     Source(s): Chart Review and Patient/Caregiver Call       Warfarin doses taken: Warfarin taken as instructed    Diet: No new diet changes identified    New illness, injury, or hospitalization: Yes: recent ER visits for sickle cell crisis    Medication/supplement changes: None noted    Signs or symptoms of bleeding or clotting: Yes: Jennifer reports she has some bleeding from lip in AM--stops with pressure    Previous INR: Subtherapeutic    Additional findings: Verified with Jennifer that she has peach colored 5 mg warfarin tablets and she has been taking 2.5 tabs/day     PLAN     Recommended plan for no diet, medication or health factor changes affecting INR     Dosing Instructions:  Increase your warfarin dose (20% change) with next INR in 3 days       Summary  As of 1/10/2022    Full warfarin instructions:  15 mg every day   Next INR check:  1/13/2022             Telephone call with Jennifer who agrees to plan and repeated back plan correctly    Lab visit scheduled    Education provided: Please call back if any changes to your diet, medications or how you've been taking warfarin and Contact [phone] with any changes, questions or concerns.     Plan made with [facility] Pharmacist Halle Mccormick, RN  Anticoagulation Clinic  1/10/2022    _______________________________________________________________________     Anticoagulation Episode Summary     Current INR goal:  2.0-3.0   TTR:  0.0 % (1.3 mo)   Target end date:  Indefinite   Send INR reminders to:  [facility]    Indications    Chronic pulmonary embolism without acute cor pulmonale  unspecified pulmonary embolism type (H) [I27.82]           Comments:  Infusion center [phone]  [facility] [phone]         Anticoagulation Care Providers      Provider Role Specialty Phone number    Eric Duncan MD Referring Pediatric Hematology-Oncology [phone]

## 2022-01-10 NOTE — PROGRESS NOTES
SW received referral to assist with transportation coordination. SW called [employer] and arranged ride for patient. SW informed patient and clinic of ride information [employer],  around 11am with will erendira return. Patient will call [phone] for return ride when appointment is complete. SW will remain available as needed.         Corry GONZALEZ, [facility]  - Oncology  Phone : [phone]  Pager: [phone]

## 2022-01-11 ENCOUNTER — TELEPHONE (OUTPATIENT)
Dept: ONCOLOGY | Facility: CLINIC | Age: 22
End: 2022-01-11
Payer: COMMERCIAL

## 2022-01-11 ENCOUNTER — INFUSION THERAPY VISIT (OUTPATIENT)
Dept: TRANSPLANT | Facility: CLINIC | Age: 22
End: 2022-01-11
Attending: PEDIATRICS
Payer: COMMERCIAL

## 2022-01-11 VITALS
SYSTOLIC BLOOD PRESSURE: 123 MMHG | DIASTOLIC BLOOD PRESSURE: 80 MMHG | HEART RATE: 105 BPM | RESPIRATION RATE: 22 BRPM | OXYGEN SATURATION: 90 % | TEMPERATURE: 99.7 F

## 2022-01-11 DIAGNOSIS — G81.10 SPASTIC HEMIPLEGIA, UNSPECIFIED ETIOLOGY, UNSPECIFIED LATERALITY (H): Primary | ICD-10-CM

## 2022-01-11 DIAGNOSIS — D57.00 SICKLE CELL PAIN CRISIS (H): ICD-10-CM

## 2022-01-11 PROCEDURE — 250N000011 HC RX IP 250 OP 636: Performed by: PEDIATRICS

## 2022-01-11 PROCEDURE — 96376 TX/PRO/DX INJ SAME DRUG ADON: CPT

## 2022-01-11 PROCEDURE — 96374 THER/PROPH/DIAG INJ IV PUSH: CPT

## 2022-01-11 PROCEDURE — 258N000003 HC RX IP 258 OP 636: Performed by: PEDIATRICS

## 2022-01-11 RX ORDER — HEPARIN SODIUM (PORCINE) LOCK FLUSH IV SOLN 100 UNIT/ML 100 UNIT/ML
5 SOLUTION INTRAVENOUS
Status: DISCONTINUED | OUTPATIENT
Start: 2022-01-11 | End: 2022-01-11 | Stop reason: HOSPADM

## 2022-01-11 RX ORDER — DIPHENHYDRAMINE HCL 25 MG
25 CAPSULE ORAL
Status: CANCELLED
Start: 2022-04-01

## 2022-01-11 RX ORDER — HEPARIN SODIUM,PORCINE 10 UNIT/ML
5 VIAL (ML) INTRAVENOUS
Status: CANCELLED | OUTPATIENT
Start: 2022-04-01

## 2022-01-11 RX ORDER — ONDANSETRON 8 MG/1
8 TABLET, FILM COATED ORAL
Status: CANCELLED
Start: 2022-04-01

## 2022-01-11 RX ORDER — HEPARIN SODIUM (PORCINE) LOCK FLUSH IV SOLN 100 UNIT/ML 100 UNIT/ML
5 SOLUTION INTRAVENOUS
Status: CANCELLED | OUTPATIENT
Start: 2022-04-01

## 2022-01-11 RX ORDER — MORPHINE SULFATE 2 MG/ML
2 INJECTION, SOLUTION INTRAMUSCULAR; INTRAVENOUS
Status: COMPLETED | OUTPATIENT
Start: 2022-01-11 | End: 2022-01-11

## 2022-01-11 RX ORDER — MORPHINE SULFATE 2 MG/ML
2 INJECTION, SOLUTION INTRAMUSCULAR; INTRAVENOUS
Status: CANCELLED
Start: 2022-04-01

## 2022-01-11 RX ADMIN — MORPHINE SULFATE 2 MG: 2 INJECTION, SOLUTION INTRAMUSCULAR; INTRAVENOUS at 14:49

## 2022-01-11 RX ADMIN — MORPHINE SULFATE 2 MG: 2 INJECTION, SOLUTION INTRAMUSCULAR; INTRAVENOUS at 13:55

## 2022-01-11 RX ADMIN — DEXTROSE AND SODIUM CHLORIDE 500 ML: 5; 450 INJECTION, SOLUTION INTRAVENOUS at 12:47

## 2022-01-11 RX ADMIN — SODIUM CHLORIDE, PRESERVATIVE FREE 5 ML: 5 INJECTION INTRAVENOUS at 15:19

## 2022-01-11 RX ADMIN — MORPHINE SULFATE 2 MG: 2 INJECTION, SOLUTION INTRAMUSCULAR; INTRAVENOUS at 12:50

## 2022-01-11 ASSESSMENT — PAIN SCALES - GENERAL: PAINLEVEL: WORST PAIN (10)

## 2022-01-11 NOTE — TELEPHONE ENCOUNTER
Jennifer called back about status of ride.     This writer informed Jennifer social workers are still working on ride and reassured pt it will be arranged and to wait for call from [employer].

## 2022-01-11 NOTE — TELEPHONE ENCOUNTER
Pt called in to triage requesting IVF pain meds for all over especially in left side and going down leg pain rated 10/10 since yesterday when got home from infusion. Slept all night from after infusion until she woke up this a.m. Stated last took prn oxycodone and MS contin this morning at 5:30 a.m. without relief. Denied any fevers, chills, cough, sob, chest pain, numbness or tingling or other symptoms not typical of sickle cell pain.   Pt's last infusion was 1/10/22, last clinic visit 12/20/22 with follow-up on Andrei Patiño with on 1/21/22.   Patient states they do need ride and it will take 25 min long to get to cancer clinic.   Pt denied being out of home medications and needing any refills today.   Paged RNCC for approval d/t frequency of infusions:  [facility]:1/3, 1/5, 1/6, 1/7,   1/8--ED  [facility]: 1/10    If you do not hear from the infusion center by 2pm then you will not be able to get in for an infusion today.   Please note, if you are late for your appt, you risk losing your infusion appt as it may delay another patient's infusion who arrived on time.       Approved by RNCC.  Called Jennifer--confirmed she can come at 12 when BMT has opening.  SW notified to arrange a ride.  IB to scheduling.     Routed to provider and RNCC

## 2022-01-11 NOTE — LETTER
1/11/2022         RE: Jennifer Cervantes  [address]  [place] [facility] [address]        Dear Colleague,    Thank you for referring your patient, Jennifer Cervantes, to the [facility] AND [facility]ARR[facility]PL[facility] [place]. Please see a copy of my visit note below.    Infusion Nursing Note:  Jennifer Cervantes presents today for add-on infusion/pain medications.    Patient seen by provider today: No   present during visit today: Not Applicable.    Note: No labs ordered for today.      Intravenous Access:  Implanted Port.    Treatment Conditions:  Patient received an add-on D5 1/2NS infusion 500 mls over two hours.  She received 3 doses of 2 mg IV push Morphine doses - each given one hour apart.      Post Infusion Assessment:  Patient tolerated infusion without incident. Patient reported partial relief from her pain and was satisfied with today's treatment.        Discharge Plan:   Patient was alert and steady on her feet and discharged in stable condition accompanied by: self.  She has transportation home.      ELINA WINTER, JOE                          Again, thank you for allowing me to participate in the care of your patient.        Sincerely,        [facility] [place]

## 2022-01-11 NOTE — PROGRESS NOTES
Infusion Nursing Note:  Jennifer Cervantes presents today for add-on infusion/pain medications.    Patient seen by provider today: No   present during visit today: Not Applicable.    Note: No labs ordered for today.      Intravenous Access:  Implanted Port.    Treatment Conditions:  Patient received an add-on D5 1/2NS infusion 500 mls over two hours.  She received 3 doses of 2 mg IV push Morphine doses - each given one hour apart.      Post Infusion Assessment:  Patient tolerated infusion without incident. Patient reported partial relief from her pain and was satisfied with today's treatment.        Discharge Plan:   Patient was alert and steady on her feet and discharged in stable condition accompanied by: self.  She has transportation home.      ELINA WINTER RN

## 2022-01-12 ENCOUNTER — INFUSION THERAPY VISIT (OUTPATIENT)
Dept: INFUSION THERAPY | Facility: CLINIC | Age: 22
End: 2022-01-12
Attending: PEDIATRICS
Payer: COMMERCIAL

## 2022-01-12 ENCOUNTER — ONCOLOGY VISIT (OUTPATIENT)
Dept: ONCOLOGY | Facility: CLINIC | Age: 22
End: 2022-01-12
Attending: STUDENT IN AN ORGANIZED HEALTH CARE EDUCATION/TRAINING PROGRAM
Payer: COMMERCIAL

## 2022-01-12 ENCOUNTER — TELEPHONE (OUTPATIENT)
Dept: ONCOLOGY | Facility: CLINIC | Age: 22
End: 2022-01-12
Payer: COMMERCIAL

## 2022-01-12 ENCOUNTER — ANTICOAGULATION THERAPY VISIT (OUTPATIENT)
Dept: ANTICOAGULATION | Facility: CLINIC | Age: 22
End: 2022-01-12

## 2022-01-12 VITALS
SYSTOLIC BLOOD PRESSURE: 152 MMHG | DIASTOLIC BLOOD PRESSURE: 82 MMHG | OXYGEN SATURATION: 92 % | HEART RATE: 102 BPM | TEMPERATURE: 98.3 F

## 2022-01-12 VITALS
OXYGEN SATURATION: 89 % | BODY MASS INDEX: 29.49 KG/M2 | WEIGHT: 171.8 LBS | HEART RATE: 103 BPM | TEMPERATURE: 99.1 F | DIASTOLIC BLOOD PRESSURE: 86 MMHG | SYSTOLIC BLOOD PRESSURE: 150 MMHG

## 2022-01-12 DIAGNOSIS — G81.10 SPASTIC HEMIPLEGIA, UNSPECIFIED ETIOLOGY, UNSPECIFIED LATERALITY (H): ICD-10-CM

## 2022-01-12 DIAGNOSIS — D57.00 SICKLE CELL DISEASE WITH CRISIS (H): ICD-10-CM

## 2022-01-12 DIAGNOSIS — I69.351 HEMIPLEGIA AND HEMIPARESIS FOLLOWING CEREBRAL INFARCTION AFFECTING RIGHT DOMINANT SIDE (H): ICD-10-CM

## 2022-01-12 DIAGNOSIS — D57.00 SICKLE CELL PAIN CRISIS (H): ICD-10-CM

## 2022-01-12 DIAGNOSIS — G81.10 SPASTIC HEMIPLEGIA, UNSPECIFIED ETIOLOGY, UNSPECIFIED LATERALITY (H): Primary | ICD-10-CM

## 2022-01-12 DIAGNOSIS — D57.00 SICKLE CELL CRISIS (H): Primary | ICD-10-CM

## 2022-01-12 DIAGNOSIS — I27.82 CHRONIC PULMONARY EMBOLISM WITHOUT ACUTE COR PULMONALE, UNSPECIFIED PULMONARY EMBOLISM TYPE (H): Primary | ICD-10-CM

## 2022-01-12 DIAGNOSIS — I27.82 CHRONIC PULMONARY EMBOLISM WITHOUT ACUTE COR PULMONALE, UNSPECIFIED PULMONARY EMBOLISM TYPE (H): ICD-10-CM

## 2022-01-12 LAB
ALBUMIN SERPL-MCNC: 4 G/DL (ref 3.4–5)
ALP SERPL-CCNC: 69 U/L (ref 40–150)
ALT SERPL W P-5'-P-CCNC: 62 U/L (ref 0–50)
ANION GAP SERPL CALCULATED.3IONS-SCNC: 10 MMOL/L (ref 3–14)
AST SERPL W P-5'-P-CCNC: 93 U/L (ref 0–45)
BASOPHILS # BLD MANUAL: 0 10E3/UL (ref 0–0.2)
BASOPHILS NFR BLD MANUAL: 0 %
BILIRUB SERPL-MCNC: 4.8 MG/DL (ref 0.2–1.3)
BUN SERPL-MCNC: 4 MG/DL (ref 7–30)
CALCIUM SERPL-MCNC: 8.6 MG/DL (ref 8.5–10.1)
CHLORIDE BLD-SCNC: 108 MMOL/L (ref 94–109)
CO2 SERPL-SCNC: 20 MMOL/L (ref 20–32)
CREAT SERPL-MCNC: 0.52 MG/DL (ref 0.52–1.04)
EOSINOPHIL # BLD MANUAL: 0.1 10E3/UL (ref 0–0.7)
EOSINOPHIL NFR BLD MANUAL: 2 %
ERYTHROCYTE [DISTWIDTH] IN BLOOD BY AUTOMATED COUNT: 28.5 % (ref 10–15)
GFR SERPL CREATININE-BSD FRML MDRD: >90 ML/MIN/1.73M2
GLUCOSE BLD-MCNC: 82 MG/DL (ref 70–99)
HCT VFR BLD AUTO: 18.9 % (ref 35–47)
HGB BLD-MCNC: 6.7 G/DL (ref 11.7–15.7)
INR PPP: 1.22 (ref 0.85–1.15)
LYMPHOCYTES # BLD MANUAL: 1.2 10E3/UL (ref 0.8–5.3)
LYMPHOCYTES NFR BLD MANUAL: 24 %
MCH RBC QN AUTO: 31.2 PG (ref 26.5–33)
MCHC RBC AUTO-ENTMCNC: 35.4 G/DL (ref 31.5–36.5)
MCV RBC AUTO: 88 FL (ref 78–100)
METAMYELOCYTES # BLD MANUAL: 0.2 10E3/UL
METAMYELOCYTES NFR BLD MANUAL: 4 %
MONOCYTES # BLD MANUAL: 0.5 10E3/UL (ref 0–1.3)
MONOCYTES NFR BLD MANUAL: 9 %
MYELOCYTES # BLD MANUAL: 0.1 10E3/UL
MYELOCYTES NFR BLD MANUAL: 1 %
NEUTROPHILS # BLD MANUAL: 3.1 10E3/UL (ref 1.6–8.3)
NEUTROPHILS NFR BLD MANUAL: 60 %
NRBC # BLD AUTO: 23.4 10E3/UL
NRBC BLD MANUAL-RTO: 459 %
PLAT MORPH BLD: ABNORMAL
PLATELET # BLD AUTO: 349 10E3/UL (ref 150–450)
POLYCHROMASIA BLD QL SMEAR: ABNORMAL
POTASSIUM BLD-SCNC: 2.9 MMOL/L (ref 3.4–5.3)
PROT SERPL-MCNC: 7.3 G/DL (ref 6.8–8.8)
RBC # BLD AUTO: 2.15 10E6/UL (ref 3.8–5.2)
RBC MORPH BLD: ABNORMAL
RETICS # AUTO: 0.25 10E6/UL (ref 0.03–0.1)
RETICS/RBC NFR AUTO: 23.4 % (ref 0.5–2)
SICKLE CELLS BLD QL SMEAR: ABNORMAL
SODIUM SERPL-SCNC: 138 MMOL/L (ref 133–144)
TARGETS BLD QL SMEAR: SLIGHT
WBC # BLD AUTO: 5.1 10E3/UL (ref 4–11)

## 2022-01-12 PROCEDURE — 85660 RBC SICKLE CELL TEST: CPT

## 2022-01-12 PROCEDURE — 83020 HEMOGLOBIN ELECTROPHORESIS: CPT

## 2022-01-12 PROCEDURE — 96374 THER/PROPH/DIAG INJ IV PUSH: CPT

## 2022-01-12 PROCEDURE — 95874 GUIDE NERV DESTR NEEDLE EMG: CPT | Performed by: STUDENT IN AN ORGANIZED HEALTH CARE EDUCATION/TRAINING PROGRAM

## 2022-01-12 PROCEDURE — 64644 CHEMODENERV 1 EXTREM 5/> MUS: CPT | Performed by: STUDENT IN AN ORGANIZED HEALTH CARE EDUCATION/TRAINING PROGRAM

## 2022-01-12 PROCEDURE — 258N000003 HC RX IP 258 OP 636: Performed by: PEDIATRICS

## 2022-01-12 PROCEDURE — 80053 COMPREHEN METABOLIC PANEL: CPT

## 2022-01-12 PROCEDURE — 85610 PROTHROMBIN TIME: CPT

## 2022-01-12 PROCEDURE — 96372 THER/PROPH/DIAG INJ SC/IM: CPT | Performed by: STUDENT IN AN ORGANIZED HEALTH CARE EDUCATION/TRAINING PROGRAM

## 2022-01-12 PROCEDURE — 250N000020 HC RX IP 250 OP 636 J0585: Performed by: STUDENT IN AN ORGANIZED HEALTH CARE EDUCATION/TRAINING PROGRAM

## 2022-01-12 PROCEDURE — 96361 HYDRATE IV INFUSION ADD-ON: CPT

## 2022-01-12 PROCEDURE — 85045 AUTOMATED RETICULOCYTE COUNT: CPT

## 2022-01-12 PROCEDURE — G0463 HOSPITAL OUTPT CLINIC VISIT: HCPCS | Mod: 25

## 2022-01-12 PROCEDURE — 36591 DRAW BLOOD OFF VENOUS DEVICE: CPT

## 2022-01-12 PROCEDURE — 250N000011 HC RX IP 250 OP 636: Performed by: PEDIATRICS

## 2022-01-12 PROCEDURE — 96376 TX/PRO/DX INJ SAME DRUG ADON: CPT

## 2022-01-12 PROCEDURE — 85014 HEMATOCRIT: CPT

## 2022-01-12 RX ORDER — HEPARIN SODIUM (PORCINE) LOCK FLUSH IV SOLN 100 UNIT/ML 100 UNIT/ML
5 SOLUTION INTRAVENOUS
Status: CANCELLED | OUTPATIENT
Start: 2022-04-01

## 2022-01-12 RX ORDER — HEPARIN SODIUM,PORCINE 10 UNIT/ML
5 VIAL (ML) INTRAVENOUS
Status: CANCELLED | OUTPATIENT
Start: 2022-04-01

## 2022-01-12 RX ORDER — MORPHINE SULFATE 2 MG/ML
2 INJECTION, SOLUTION INTRAMUSCULAR; INTRAVENOUS
Status: CANCELLED
Start: 2022-04-01

## 2022-01-12 RX ORDER — ONDANSETRON 8 MG/1
8 TABLET, FILM COATED ORAL
Status: CANCELLED
Start: 2022-04-01

## 2022-01-12 RX ORDER — HEPARIN SODIUM (PORCINE) LOCK FLUSH IV SOLN 100 UNIT/ML 100 UNIT/ML
5 SOLUTION INTRAVENOUS
Status: DISCONTINUED | OUTPATIENT
Start: 2022-01-12 | End: 2022-01-13 | Stop reason: HOSPADM

## 2022-01-12 RX ORDER — MORPHINE SULFATE 2 MG/ML
2 INJECTION, SOLUTION INTRAMUSCULAR; INTRAVENOUS
Status: DISCONTINUED | OUTPATIENT
Start: 2022-01-12 | End: 2022-01-13 | Stop reason: HOSPADM

## 2022-01-12 RX ORDER — DIPHENHYDRAMINE HCL 25 MG
25 CAPSULE ORAL
Status: CANCELLED
Start: 2022-04-01

## 2022-01-12 RX ADMIN — MORPHINE SULFATE 2 MG: 2 INJECTION, SOLUTION INTRAMUSCULAR; INTRAVENOUS at 15:41

## 2022-01-12 RX ADMIN — DEXTROSE AND SODIUM CHLORIDE 500 ML: 5; 450 INJECTION, SOLUTION INTRAVENOUS at 14:42

## 2022-01-12 RX ADMIN — MORPHINE SULFATE 2 MG: 2 INJECTION, SOLUTION INTRAMUSCULAR; INTRAVENOUS at 14:41

## 2022-01-12 RX ADMIN — MORPHINE SULFATE 2 MG: 2 INJECTION, SOLUTION INTRAMUSCULAR; INTRAVENOUS at 16:32

## 2022-01-12 RX ADMIN — ONABOTULINUMTOXINA 200 UNITS: 100 INJECTION, POWDER, LYOPHILIZED, FOR SOLUTION INTRADERMAL; INTRAMUSCULAR at 14:08

## 2022-01-12 RX ADMIN — Medication 5 ML: at 16:57

## 2022-01-12 ASSESSMENT — PAIN SCALES - GENERAL: PAINLEVEL: WORST PAIN (10)

## 2022-01-12 NOTE — PROGRESS NOTES
Nursing Note  Jennifer Cervantes presents today to [facility] for:   Chief Complaint   Patient presents with     Infusion     fluids and pain meds     During today's [facility] appointment, orders from  were completed.  Frequency: as needed    Progress note:  Patient identification verified by name and date of birth.  Assessment completed.  Vitals recorded in Doc Flowsheets.  Patient was provided with education regarding medication/procedure and possible side effects.  Patient verbalized understanding.     present during visit today: Not Applicable.    Treatment Conditions: Morphine administered Q1H x 3 per orders. Pt declined antiemetic and diphenhydramine today.     Premedications: were administered.     Drug Waste Record: No    Infusion length and rate:  infusion given over approximately 2 hours  250 ml/hr.      DATE:  1/12/2022   TIME OF RECEIPT FROM LAB:  1515  LAB TEST:  hbg  LAB VALUE:  6.7  RESULTS GIVEN WITH READ-BACK TO (PROVIDER):     Dr. Duncan  TIME LAB VALUE REPORTED TO PROVIDER:   1515 via text page and return call as confirmation at 1525    Labs: were drawn per orders.     Vascular access: port accessed today.    Is the next appt scheduled? PRN  Asymptomatic COVID test completed? no    Post Infusion Assessment:  Patient tolerated infusion without incident.     Discharge Plan:   Follow up plan of care with: ongoing infusions at [facility]. and primary care provider,  Discharge instructions were reviewed with patient.  Patient/representative verbalized understanding of discharge instructions and all questions answered.  Patient discharged from [facility] in stable condition.    Tati Iglesias RN    Administrations This Visit     dextrose 5% and 0.45% NaCl BOLUS     Admin Date  01/12/2022 Action  New Bag Dose  500 mL Rate  250 mL/hr Route  Intravenous Administered  By  Tati Iglesias RN          morphine (PF) injection 2 mg     Admin Date  01/12/2022 Action  Given Dose  2 mg Route  Intravenous Administered By  Tati Iglesias RN           Admin Date  01/12/2022 Action  Given Dose  2 mg Route  Intravenous Administered By  Tati Iglesias RN           Admin Date  01/12/2022 Action  Given Dose  2 mg Route  Intravenous Administered By  Tati Iglesias, JOE                BP (!) 146/81   Pulse 108   Temp 98.3  F (36.8  C) (Oral)   SpO2 92%

## 2022-01-12 NOTE — LETTER
1/12/2022         RE: Jennifer Cervantes  [address]  [place] [facility] [address]        Dear Colleague,    Thank you for referring your patient, Jennifer Cervantes, to the [facility]AT[facility]. Please see a copy of my visit note below.    Nursing Note  Jennifer Cervantes presents [facility] for:   Chief Complaint   Patient presents with     Infusion     fluids and pain meds     During [facility]'s [facility] appointment, orders from  were completed.  Frequency: as needed    Progress note:  Patient identification verified by name and date of birth.  Assessment completed.  Vitals recorded in Doc Flowsheets.  Patient was provided with education regarding medication/procedure and possible side effects.  Patient verbalized understanding.     present during visit today: Not Applicable.    Treatment Conditions: Morphine administered Q1H x 3 per orders. Pt declined antiemetic and diphenhydramine today.     Premedications: were administered.     Drug Waste Record: No    Infusion length and rate:  infusion given over approximately 2 hours  250 ml/hr.      DATE:  1/12/2022   TIME OF RECEIPT FROM LAB:  1515  LAB TEST:  hbg  LAB VALUE:  6.7  RESULTS GIVEN WITH READ-BACK TO (PROVIDER):     Dr. Duncan  TIME LAB VALUE REPORTED TO PROVIDER:   1515 via text page and return call as confirmation at 1525    Labs: were drawn per orders.     Vascular access: port accessed today.    Is the next appt scheduled? PRN  Asymptomatic COVID test completed? no    Post Infusion Assessment:  Patient tolerated infusion without incident.     Discharge Plan:   Follow up plan of care with: ongoing infusions at [facility]. and primary care provider,  Discharge instructions were reviewed with patient.  Patient/representative verbalized understanding of discharge instructions and all questions answered.  Patient  discharged from [facility] in stable condition.    Tati Iglesias RN    Administrations This Visit     dextrose 5% and 0.45% NaCl BOLUS     Admin Date  01/12/2022 Action  New Bag Dose  500 mL Rate  250 mL/hr Route  Intravenous Administered By  Tati Iglesias RN          morphine (PF) injection 2 mg     Admin Date  01/12/2022 Action  Given Dose  2 mg Route  Intravenous Administered By  Tati Iglesias RN           Admin Date  01/12/2022 Action  Given Dose  2 mg Route  Intravenous Administered By  Tati Iglesias RN           Admin Date  01/12/2022 Action  Given Dose  2 mg Route  Intravenous Administered By  Tati Iglesias RN                BP (!) 146/81   Pulse 108   Temp 98.3  F (36.8  C) (Oral)   SpO2 92%           Again, thank you for allowing me to participate in the care of your patient.      Sincerely,    [facility]

## 2022-01-12 NOTE — CONFIDENTIAL NOTE
Pt called in to triage requesting IVF pain meds for lower back  pain rated 10/10 x awhile now. Stated last took prn tylenol,oxycodone and MS contin this morning without relief. Denied any fevers, chills, cough, sob, chest pain, numbness or tingling or other symptoms not typical of sickle cell pain.   Pt's last infusion was 11/11/21, last clinic visit 12/20/21 with follow-up on 1/21/21 with Andrei HIGGINS  Patient states they have a patient with Dr Katherine Pirece to get Botox injections in right side of body at 11:00 am. Wondering if infusion appointment can be worked around botox appointment.   Patient states they do have own ride to 11:00 am visit and it will take 60 minutes long to get to cancer clinic.   Pt denied being out of home medications and needing any refills today.   Pt qualifies for sickle cell standing order protocol.  If you do not hear from the infusion center by 2pm then you will not be able to get in for an infusion today.   Please note, if you are late for your appt, you risk losing your infusion appt as it may delay another patient's infusion who arrived on time.     [facility] can take Jennifer can take at 2:30 pm. She has her rides all set up for the day.     Message sent to  to schedule.

## 2022-01-12 NOTE — LETTER
1/12/2022         RE: Jennifer Cervantes  [address]  [place] [address]        Dear Colleague,    Thank you for referring your patient, Jennifer Cervantes, to the [facility]. Please see a copy of my visit note below.    PM&R Botox Procedure Note      Chief Complaint   Patient presents with     Oncology Clinic Visit     Right sided weakness-botox injections       BP (!) 150/86 (BP Location: Left arm, Patient Position: Sitting, Cuff Size: Adult Small)   Pulse 103   Temp 99.1  F (37.3  C) (Oral)   Wt 77.9 kg (171 lb 12.8 oz)   SpO2 (!) 89%   BMI 29.49 kg/m         Current Outpatient Medications:      acetaminophen (TYLENOL) 325 MG tablet, Take 2 tablets (650 mg) by mouth every 6 hours as needed for mild pain, Disp: 120 tablet, Rfl: 3     albuterol (PROAIR HFA/PROVENTIL HFA/VENTOLIN HFA) 108 (90 Base) MCG/ACT inhaler, Inhale 2 puffs into the lungs every 6 hours as needed for shortness of breath / dyspnea or wheezing, Disp: 8.5 g, Rfl: 3     albuterol (PROVENTIL) (2.5 MG/3ML) 0.083% neb solution, Take 1 vial (2.5 mg) by nebulization every 6 hours as needed for shortness of breath / dyspnea or wheezing, Disp: 12 mL, Rfl: 4     ARIPiprazole (ABILIFY) 2 MG tablet, Take 1 tablet (2 mg) by mouth daily, Disp: 30 tablet, Rfl: 3     budesonide-formoterol (SYMBICORT) 160-4.5 MCG/ACT Inhaler, Inhale 2 puffs into the lungs 2 times daily, Disp: 10.2 g, Rfl: 3     cefdinir (OMNICEF) 300 MG capsule, Take 1 capsule (300 mg) by mouth 2 times daily For uti, Disp: 14 capsule, Rfl: 0     deferasirox (JADENU) 360 MG tablet, Take 4 tablets (1,440 mg) by mouth every evening, Disp: 120 tablet, Rfl: 4     diphenhydrAMINE (BENADRYL) 25 MG capsule, Take 1-2 capsules (25-50 mg) by mouth nightly as needed for sleep, Disp: 60 capsule, Rfl: 3     EPINEPHrine (ANY BX GENERIC EQUIV) 0.3 MG/0.3ML injection 2-pack, Inject 0.3 mLs (0.3 mg) into the muscle as needed for anaphylaxis, Disp: 1 each, Rfl: 1     Hydroxyurea  1000 MG TABS, Take 2,000 mg by mouth daily, Disp: 60 tablet, Rfl: 3     hydrOXYzine (ATARAX) 25 MG tablet, Take 1 tablet (25 mg) by mouth 3 times daily as needed for anxiety, Disp: 30 tablet, Rfl: 1     morphine (MS CONTIN) 15 MG CR tablet, Take 1 tablet (15 mg) by mouth every 12 hours, Disp: 60 tablet, Rfl: 0     naloxone (NARCAN) 4 MG/0.1ML nasal spray, Spray 1 spray (4 mg) into one nostril alternating nostrils once as needed for opioid reversal every 2-3 minutes until assistance arrives, Disp: 0.2 mL, Rfl: 1     omeprazole (PRILOSEC) 20 MG DR capsule, Take 1 capsule (20 mg) by mouth daily, Disp: 30 capsule, Rfl: 1     ondansetron (ZOFRAN) 8 MG tablet, Take 1 tablet (8 mg) by mouth every 8 hours as needed, Disp: 30 tablet, Rfl: 1     oxyCODONE IR (ROXICODONE) 15 MG tablet, Take 1 tablet (15 mg) by mouth every 4 hours as needed for severe pain Goal 4 per day. Max 6 per day., Disp: 45 tablet, Rfl: 0     sertraline (ZOLOFT) 100 MG tablet, Take 2 tablets (200 mg) by mouth daily, Disp: 60 tablet, Rfl: 3     warfarin ANTICOAGULANT (COUMADIN) 5 MG tablet, Take 2 to 3 tablets by mouth daily or as directed by the Coumadin clinic., Disp: 60 tablet, Rfl: 1     warfarin ANTICOAGULANT (COUMADIN) 5 MG tablet, Take 1 tablet (5 mg) by mouth four times a week Take 5 mg on Tues, Thurs, Sat, Sun. INR check on Tues 11/23 so dose can change., Disp: 16 tablet, Rfl: 0     warfarin ANTICOAGULANT (COUMADIN) 7.5 MG tablet, Take 1 tablet (7.5 mg) by mouth three times a week Take 7.5 mg on Mon, Wed, Fri. INR check on Tues 11/23 so dose can change., Disp: 12 tablet, Rfl: 0     lidocaine-prilocaine (EMLA) 2.5-2.5 % external cream, Apply topically once for 1 dose Use for port site as needed (Patient not taking: Reported on 11/10/2021), Disp: 30 g, Rfl: 1     medroxyPROGESTERone (DEPO-PROVERA) 150 MG/ML IM injection, Inject 150 mg into the muscle, Disp: , Rfl:     Current Facility-Administered Medications:      botulinum toxin type A (BOTOX) 100  units injection 400 Units, 400 Units, Intramuscular, Q90 Days, Katherine Pierce MD, 200 Units at 01/12/22 1408     medroxyPROGESTERone (DEPO-PROVERA) syringe 150 mg, 150 mg, Intramuscular, Q90 Days, Suraj Case MD, 150 mg at 11/26/21 1406    Facility-Administered Medications Ordered in Other Visits:      dextrose 5% and 0.45% NaCl BOLUS, 500 mL, Intravenous, Once, Eric Duncan MD, Last Rate: 250 mL/hr at 01/12/22 1442, 500 mL at 01/12/22 1442     heparin 100 UNIT/ML injection 5 mL, 5 mL, Intracatheter, Once PRN, Eric Duncan MD     morphine (PF) injection 2 mg, 2 mg, Intravenous, Q1H PRN, Eric Duncan MD, 2 mg at 01/12/22 1441        Allergies   Allergen Reactions     Contrast Dye      Hives and breathing issues     Fish-Derived Products Hives     Seafood Hives     Diagnostic X-Ray Materials      Gadolinium         PHYSICAL EXAM  Motor: 4+/5 with right shoulder abduction, 4/5 with right elbow flexion, unable to assess wrist flexion due to contracture. 4/5 with  strength on right. 5/5 left shoulder abduction, elbow extension, wrist extension and  strength/finger intrinsics. 4/5 with right hip flexion, 4/5 with right knee extension, 3/5 with right ankle dorsiflexion, 4+/5 with right EHL, plantar flexion. 5/5 with all muscle groups in left lower extremity.  ROM is 120 degrees with right shoulder abduction, about 130 degrees with right elbow extension.   Tone with MAS- 2/4 with right shoulder abduction, 3/4 with right finger flexors/intrinsics, 2/4 with right knee flexion. Significant right wrist contracture with minimal extension.  Sensory: intact to light touch throughout all dermatomes in bilateral lower extremities.      HPI  Last set of injections were on 10/13/21.  Patient has been ok since her last visit. Continues to come in for frequent infusions to help with pain management and ongoing disease process. Continues with bracing to try to stretch out right upper  extremity and continues range of motion exercises at home.    RESPONSE TO PREVIOUS TREATMENT:  Patient reports significant noticeable improvement in right upper and lower extremity range of motion since her last visit. About 50%. She states that she has not noticed increased wearing off of the effects of the medication.    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    Her baseline symptoms have been recalcitrant to oral medications and conservative therapy.  She is here today for reinjection with Botox.    GOAL OF PROCEDURE:  The goal of this procedure is to improve increase active range of motion, improve volitional motor control, decrease pain  and enhance functional independence associated with spasticity.    TOTAL DOSE ADMINISTERED:  Dose Administered:  150 units Botox (Botulinum Toxin Type A)       1:1 Dilution     Diluent Used:  Preservative Free Normal Saline  Total Volume of Diluent Used:  1.5 ml  Lot # [id number] with Expiration Date:  2024  Lot # [id number] with Expiration Date: 2024    CONSENT:  The risks, benefits, and treatment options were discussed with Jennifer Cervantes and she agreed to proceed.    EQUIPMENT USED:  Needle-37mm stimulating/recording  EMG/NCS Machine    SKIN PREPARATION:  Skin preparation was performed using an alcohol wipe.      GUIDANCE DESCRIPTION:  Electro-myographic guidance was necessary throughout the procedure to accurately identify all areas of spastic  muscles while avoiding injection of non-spastic muscles and underlying muscles , neighboring nerves and nearby vascular structures.     AREA/MUSCLE INJECTED:  Right biceps- 30 U at 2 sites  Right BR- 20 U  Right pronator teres- 30 U  Right FDS- 15 U  Right FCR- 15 U  Right biceps femoris- 40 U at 2 sites    RESPONSE TO PROCEDURE:  Jennifer Cervantes tolerated the procedure well and there were no immediate complications.   She was allowed to recover for an appropriate period of time and was discharged home in stable condition.     Physical Therapy referral placed to help with gait and strength in the setting of right hemiparesis.  Orthotics referral placed as patient would like to review options for bracing for her right lower extremity.    Jennifer Cervantes will follow up by phone with Dr. Pierce for any questions or concerns that may arise.  Jennifer Cervantes will be scheduled for the next series of injections in 12 weeks.            Again, thank you for allowing me to participate in the care of your patient.      Sincerely,    Katherine Pierce MD

## 2022-01-12 NOTE — PROGRESS NOTES
PM&R Botox Procedure Note      Chief Complaint   Patient presents with     Oncology Clinic Visit     Right sided weakness-botox injections       BP (!) 150/86 (BP Location: Left arm, Patient Position: Sitting, Cuff Size: Adult Small)   Pulse 103   Temp 99.1  F (37.3  C) (Oral)   Wt 77.9 kg (171 lb 12.8 oz)   SpO2 (!) 89%   BMI 29.49 kg/m         Current Outpatient Medications:      acetaminophen (TYLENOL) 325 MG tablet, Take 2 tablets (650 mg) by mouth every 6 hours as needed for mild pain, Disp: 120 tablet, Rfl: 3     albuterol (PROAIR HFA/PROVENTIL HFA/VENTOLIN HFA) 108 (90 Base) MCG/ACT inhaler, Inhale 2 puffs into the lungs every 6 hours as needed for shortness of breath / dyspnea or wheezing, Disp: 8.5 g, Rfl: 3     albuterol (PROVENTIL) (2.5 MG/3ML) 0.083% neb solution, Take 1 vial (2.5 mg) by nebulization every 6 hours as needed for shortness of breath / dyspnea or wheezing, Disp: 12 mL, Rfl: 4     ARIPiprazole (ABILIFY) 2 MG tablet, Take 1 tablet (2 mg) by mouth daily, Disp: 30 tablet, Rfl: 3     budesonide-formoterol (SYMBICORT) 160-4.5 MCG/ACT Inhaler, Inhale 2 puffs into the lungs 2 times daily, Disp: 10.2 g, Rfl: 3     cefdinir (OMNICEF) 300 MG capsule, Take 1 capsule (300 mg) by mouth 2 times daily For uti, Disp: 14 capsule, Rfl: 0     deferasirox (JADENU) 360 MG tablet, Take 4 tablets (1,440 mg) by mouth every evening, Disp: 120 tablet, Rfl: 4     diphenhydrAMINE (BENADRYL) 25 MG capsule, Take 1-2 capsules (25-50 mg) by mouth nightly as needed for sleep, Disp: 60 capsule, Rfl: 3     EPINEPHrine (ANY BX GENERIC EQUIV) 0.3 MG/0.3ML injection 2-pack, Inject 0.3 mLs (0.3 mg) into the muscle as needed for anaphylaxis, Disp: 1 each, Rfl: 1     Hydroxyurea 1000 MG TABS, Take 2,000 mg by mouth daily, Disp: 60 tablet, Rfl: 3     hydrOXYzine (ATARAX) 25 MG tablet, Take 1 tablet (25 mg) by mouth 3 times daily as needed for anxiety, Disp: 30 tablet, Rfl: 1     morphine (MS CONTIN) 15 MG CR tablet, Take 1  tablet (15 mg) by mouth every 12 hours, Disp: 60 tablet, Rfl: 0     naloxone (NARCAN) 4 MG/0.1ML nasal spray, Spray 1 spray (4 mg) into one nostril alternating nostrils once as needed for opioid reversal every 2-3 minutes until assistance arrives, Disp: 0.2 mL, Rfl: 1     omeprazole (PRILOSEC) 20 MG DR capsule, Take 1 capsule (20 mg) by mouth daily, Disp: 30 capsule, Rfl: 1     ondansetron (ZOFRAN) 8 MG tablet, Take 1 tablet (8 mg) by mouth every 8 hours as needed, Disp: 30 tablet, Rfl: 1     oxyCODONE IR (ROXICODONE) 15 MG tablet, Take 1 tablet (15 mg) by mouth every 4 hours as needed for severe pain Goal 4 per day. Max 6 per day., Disp: 45 tablet, Rfl: 0     sertraline (ZOLOFT) 100 MG tablet, Take 2 tablets (200 mg) by mouth daily, Disp: 60 tablet, Rfl: 3     warfarin ANTICOAGULANT (COUMADIN) 5 MG tablet, Take 2 to 3 tablets by mouth daily or as directed by the Coumadin clinic., Disp: 60 tablet, Rfl: 1     warfarin ANTICOAGULANT (COUMADIN) 5 MG tablet, Take 1 tablet (5 mg) by mouth four times a week Take 5 mg on Tues, Thurs, Sat, Sun. INR check on Tues 11/23 so dose can change., Disp: 16 tablet, Rfl: 0     warfarin ANTICOAGULANT (COUMADIN) 7.5 MG tablet, Take 1 tablet (7.5 mg) by mouth three times a week Take 7.5 mg on Mon, Wed, Fri. INR check on Tues 11/23 so dose can change., Disp: 12 tablet, Rfl: 0     lidocaine-prilocaine (EMLA) 2.5-2.5 % external cream, Apply topically once for 1 dose Use for port site as needed (Patient not taking: Reported on 11/10/2021), Disp: 30 g, Rfl: 1     medroxyPROGESTERone (DEPO-PROVERA) 150 MG/ML IM injection, Inject 150 mg into the muscle, Disp: , Rfl:     Current Facility-Administered Medications:      botulinum toxin type A (BOTOX) 100 units injection 400 Units, 400 Units, Intramuscular, Q90 Days, Katherine Pierce MD, 200 Units at 01/12/22 1408     medroxyPROGESTERone (DEPO-PROVERA) syringe 150 mg, 150 mg, Intramuscular, Q90 Days, Suraj Case MD, 150 mg at  11/26/21 1406    Facility-Administered Medications Ordered in Other Visits:      dextrose 5% and 0.45% NaCl BOLUS, 500 mL, Intravenous, Once, Eric Duncan MD, Last Rate: 250 mL/hr at 01/12/22 1442, 500 mL at 01/12/22 1442     heparin 100 UNIT/ML injection 5 mL, 5 mL, Intracatheter, Once PRN, Eirc Duncan MD     morphine (PF) injection 2 mg, 2 mg, Intravenous, Q1H PRN, Eric Duncan MD, 2 mg at 01/12/22 1441        Allergies   Allergen Reactions     Contrast Dye      Hives and breathing issues     Fish-Derived Products Hives     Seafood Hives     Diagnostic X-Ray Materials      Gadolinium         PHYSICAL EXAM  Motor: 4+/5 with right shoulder abduction, 4/5 with right elbow flexion, unable to assess wrist flexion due to contracture. 4/5 with  strength on right. 5/5 left shoulder abduction, elbow extension, wrist extension and  strength/finger intrinsics. 4/5 with right hip flexion, 4/5 with right knee extension, 3/5 with right ankle dorsiflexion, 4+/5 with right EHL, plantar flexion. 5/5 with all muscle groups in left lower extremity.  ROM is 120 degrees with right shoulder abduction, about 130 degrees with right elbow extension.   Tone with MAS- 2/4 with right shoulder abduction, 3/4 with right finger flexors/intrinsics, 2/4 with right knee flexion. Significant right wrist contracture with minimal extension.  Sensory: intact to light touch throughout all dermatomes in bilateral lower extremities.      HPI  Last set of injections were on 10/13/21.  Patient has been ok since her last visit. Continues to come in for frequent infusions to help with pain management and ongoing disease process. Continues with bracing to try to stretch out right upper extremity and continues range of motion exercises at home.    RESPONSE TO PREVIOUS TREATMENT:  Patient reports significant noticeable improvement in right upper and lower extremity range of motion since her last visit. About 50%. She states  that she has not noticed increased wearing off of the effects of the medication.    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    Her baseline symptoms have been recalcitrant to oral medications and conservative therapy.  She is here today for reinjection with Botox.    GOAL OF PROCEDURE:  The goal of this procedure is to improve increase active range of motion, improve volitional motor control, decrease pain  and enhance functional independence associated with spasticity.    TOTAL DOSE ADMINISTERED:  Dose Administered:  150 units Botox (Botulinum Toxin Type A)       1:1 Dilution     Diluent Used:  Preservative Free Normal Saline  Total Volume of Diluent Used:  1.5 ml  Lot # [id number] with Expiration Date:  2024  Lot # [id number] with Expiration Date: 2024    CONSENT:  The risks, benefits, and treatment options were discussed with Jennifer Cervantes and she agreed to proceed.    EQUIPMENT USED:  Needle-37mm stimulating/recording  EMG/NCS Machine    SKIN PREPARATION:  Skin preparation was performed using an alcohol wipe.      GUIDANCE DESCRIPTION:  Electro-myographic guidance was necessary throughout the procedure to accurately identify all areas of spastic muscles while avoiding injection of non-spastic muscles and underlying muscles , neighboring nerves and nearby vascular structures.     AREA/MUSCLE INJECTED:  Right biceps- 30 U at 2 sites  Right BR- 20 U  Right pronator teres- 30 U  Right  FDS- 15 U  Right FCR- 15 U  Right biceps femoris- 40 U at 2 sites    RESPONSE TO PROCEDURE:  Jennifer Cervantes tolerated the procedure well and there were no immediate complications.   She was allowed to recover for an appropriate period of time and was discharged home in stable condition.     Physical Therapy referral placed to help with gait and strength in the setting of right hemiparesis.  Orthotics referral placed as patient would like to review options for bracing for her right lower extremity.    Jennifer NEWMAN Billy will follow up by phone with Dr. Pierce for any questions or concerns that may arise.  Jennifer Cervantes will be scheduled for the next series of injections in 12 weeks.

## 2022-01-12 NOTE — PROGRESS NOTES
ANTICOAGULATION MANAGEMENT     Jennifer Cervantes 21 year old female is on warfarin with subtherapeutic INR result. (Goal INR 2.0-3.0)    Recent labs: (last 7 days)     01/12/22  1441   INR 1.22*       ASSESSMENT     Source(s): Chart Review and Patient/Caregiver Call       Warfarin doses taken: Warfarin taken as instructed    Diet: No new diet changes identified    New illness, injury, or hospitalization: Yes: frequent ED visits for sickle cell pain crisis    Medication/supplement changes: None noted    Signs or symptoms of bleeding or clotting: No    Previous INR: Subtherapeutic    Additional findings: patient confirmed peach tablets     PLAN     Recommended plan for ongoing change(s) affecting INR     Dosing Instructions: Booster dose then Increase your warfarin dose (33.3% change) with next INR in 2 days       Summary  As of 1/12/2022    Full warfarin instructions:  1/12: 30 mg; Otherwise 20 mg every day   Next INR check:  1/14/2022             Telephone call with Jennifer who verbalizes understanding and agrees to plan and who agrees to plan and repeated back plan correctly    Lab visit scheduled    Education provided: Goal range and significance of current result, Importance of therapeutic range, Importance of following up at instructed interval, Importance of taking warfarin as instructed, Warfarin tablet strength change; remove and/or discard previous strength from medication supply and Contact [phone] with any changes, questions or concerns.     Plan made with [facility] Pharmacist Halle MORENO, RN  Anticoagulation Clinic  1/12/2022    _______________________________________________________________________     Anticoagulation Episode Summary     Current INR goal:  2.0-3.0   TTR:  0.0 % (1.4 mo)   Target end date:  Indefinite   Send INR reminders to:  [facility]    Indications    Chronic pulmonary embolism without acute cor pulmonale  unspecified pulmonary embolism type (H) [I27.82]            Comments:  [facility] [phone]  [facility] [phone]         Anticoagulation Care Providers     Provider Role Specialty Phone number    Eric Duncan MD Referring Pediatric Hematology-Oncology [phone]

## 2022-01-12 NOTE — NURSING NOTE
"Oncology Rooming Note    January 12, 2022 11:28 AM   Jennifer Cervantes is a 21 year old female who presents for:    Chief Complaint   Patient presents with     Oncology Clinic Visit     Right sided weakness-botox injections     Initial Vitals: BP (!) 150/86 (BP Location: Left arm, Patient Position: Sitting, Cuff Size: Adult Small)   Pulse 103   Temp 99.1  F (37.3  C) (Oral)   Wt 77.9 kg (171 lb 12.8 oz)   SpO2 (!) 89%   BMI 29.49 kg/m   Estimated body mass index is 29.49 kg/m  as calculated from the following:    Height as of 1/8/22: 1.626 m (5' 4\").    Weight as of this encounter: 77.9 kg (171 lb 12.8 oz). Body surface area is 1.88 meters squared.  Worst Pain (10) Comment: Data Unavailable   No LMP recorded. Patient has had an injection.  Allergies reviewed: Yes  Medications reviewed: Yes    Medications: Medication refills not needed today.  Pharmacy name entered into [facility]:    [facility] - [place], [place] - [address] [facility] - [place], [place] - [phone]    Clinical concerns: Patient reports all over body pain 10/10. BP and pulse both elevated today-reported to provider.          Ai Meadows LPN January 12, 2022 11:30 AM                "

## 2022-01-12 NOTE — PATIENT INSTRUCTIONS
1. You received botox injections today for your right arm and right leg spasticity.   2. You will be scheduled for your next set of injections in 12 weeks.

## 2022-01-13 ENCOUNTER — NURSE TRIAGE (OUTPATIENT)
Dept: ONCOLOGY | Facility: CLINIC | Age: 22
End: 2022-01-13
Payer: COMMERCIAL

## 2022-01-13 ENCOUNTER — HOME INFUSION (PRE-WILLOW HOME INFUSION) (OUTPATIENT)
Dept: PHARMACY | Facility: CLINIC | Age: 22
End: 2022-01-13

## 2022-01-13 ENCOUNTER — PATIENT OUTREACH (OUTPATIENT)
Dept: ONCOLOGY | Facility: CLINIC | Age: 22
End: 2022-01-13
Payer: COMMERCIAL

## 2022-01-13 NOTE — PROGRESS NOTES
Called pt to offer her in person visit with Dr. Duncan on 1/17 at 12 pm, no labs needed. Pt stated she is in a lot of pain and tried to get into infusion today but called later due to sleeping in and now trying to manage at home and not go to the ED. She stated she was taking her Oxy every 6 hours. She feels she needs a transfusion and asked permission from Dr. Duncan to get one. Confirmed with her she has been receiving sub-q deferoxamine with home infusion every 2 weeks. Blood therapy does give ok to transfuse if hgb <7 and her hgb was 6.7 yesterday. She confirmed visit for Monday and will follow-up with request for transfusion.

## 2022-01-13 NOTE — TELEPHONE ENCOUNTER
[facility] Triage    Incoming call:    Pt called in to triage requesting IVF pain meds for lower back and legs pain rated 9/10 x today. Stated last took oxy and MS contin this morning without relief. Denied any fevers, chills, cough, sob, chest pain or other symptoms. Denies confusion, numbness, paralysis, vomiting, headache, vision issues, difficulty walking and/or talking. Pt's last infusion was 1/12/22, last clinic visit 12/20/21 Perla with follow-up on 1/21/22 with Andrei Machado. Patient states they have own ride and it will take 20-25min to get to cancer clinic. Pt denied being out of home medications and needing any refills today. Pt does not qualifyfor sickle cell standing order protocol.    Paged Dr. Perla Duncan returned call and denied an infusion and Amanda will be contacting patient for further coordination of appts possibly Monday between noon and 130.    Routing to Dr. Duncan and Amanda Roth

## 2022-01-14 ENCOUNTER — HOSPITAL ENCOUNTER (EMERGENCY)
Facility: CLINIC | Age: 22
Discharge: HOME | End: 2022-01-14
Attending: EMERGENCY MEDICINE | Admitting: EMERGENCY MEDICINE
Payer: COMMERCIAL

## 2022-01-14 ENCOUNTER — TELEPHONE (OUTPATIENT)
Dept: CARDIOLOGY | Facility: CLINIC | Age: 22
End: 2022-01-14

## 2022-01-14 ENCOUNTER — DOCUMENTATION ONLY (OUTPATIENT)
Dept: ANTICOAGULATION | Facility: CLINIC | Age: 22
End: 2022-01-14

## 2022-01-14 VITALS
TEMPERATURE: 98.6 F | SYSTOLIC BLOOD PRESSURE: 130 MMHG | DIASTOLIC BLOOD PRESSURE: 78 MMHG | HEART RATE: 102 BPM | OXYGEN SATURATION: 93 % | RESPIRATION RATE: 16 BRPM

## 2022-01-14 DIAGNOSIS — D57.00 SICKLE CELL PAIN CRISIS (H): ICD-10-CM

## 2022-01-14 LAB
ANION GAP SERPL CALCULATED.3IONS-SCNC: 7 MMOL/L (ref 3–14)
BASOPHILS # BLD MANUAL: 0.1 10E3/UL (ref 0–0.2)
BASOPHILS NFR BLD MANUAL: 1 %
BUN SERPL-MCNC: 6 MG/DL (ref 7–30)
CALCIUM SERPL-MCNC: 8.6 MG/DL (ref 8.5–10.1)
CHLORIDE BLD-SCNC: 109 MMOL/L (ref 94–109)
CO2 SERPL-SCNC: 22 MMOL/L (ref 20–32)
CREAT SERPL-MCNC: 0.48 MG/DL (ref 0.52–1.04)
ELLIPTOCYTES BLD QL SMEAR: ABNORMAL
EOSINOPHIL # BLD MANUAL: 0 10E3/UL (ref 0–0.7)
EOSINOPHIL NFR BLD MANUAL: 0 %
ERYTHROCYTE [DISTWIDTH] IN BLOOD BY AUTOMATED COUNT: 28.1 % (ref 10–15)
GFR SERPL CREATININE-BSD FRML MDRD: >90 ML/MIN/1.73M2
GLUCOSE BLD-MCNC: 103 MG/DL (ref 70–99)
HCT VFR BLD AUTO: 20.3 % (ref 35–47)
HGB A1 MFR BLD: 13.2 %
HGB A2 MFR BLD: 3.1 %
HGB BLD-MCNC: 6.9 G/DL (ref 11.7–15.7)
HGB C MFR BLD: 0 %
HGB E MFR BLD: 0 %
HGB F MFR BLD: 4.3 %
HGB FRACT BLD ELPH-IMP: ABNORMAL
HGB OTHER MFR BLD: 0 %
HGB S BLD QL SOLY: ABNORMAL
HGB S MFR BLD: 79.4 %
LYMPHOCYTES # BLD MANUAL: 3.4 10E3/UL (ref 0.8–5.3)
LYMPHOCYTES NFR BLD MANUAL: 40 %
MCH RBC QN AUTO: 31.4 PG (ref 26.5–33)
MCHC RBC AUTO-ENTMCNC: 34 G/DL (ref 31.5–36.5)
MCV RBC AUTO: 92 FL (ref 78–100)
METAMYELOCYTES # BLD MANUAL: 0.1 10E3/UL
METAMYELOCYTES NFR BLD MANUAL: 1 %
MONOCYTES # BLD MANUAL: 0.4 10E3/UL (ref 0–1.3)
MONOCYTES NFR BLD MANUAL: 5 %
MYELOCYTES # BLD MANUAL: 0.1 10E3/UL
MYELOCYTES NFR BLD MANUAL: 1 %
NEUTROPHILS # BLD MANUAL: 4.4 10E3/UL (ref 1.6–8.3)
NEUTROPHILS NFR BLD MANUAL: 52 %
NRBC # BLD AUTO: 22.5 10E3/UL
NRBC BLD MANUAL-RTO: 265 %
PATH INTERP BLD-IMP: ABNORMAL
PLAT MORPH BLD: ABNORMAL
PLATELET # BLD AUTO: 385 10E3/UL (ref 150–450)
POLYCHROMASIA BLD QL SMEAR: SLIGHT
POTASSIUM BLD-SCNC: 3 MMOL/L (ref 3.4–5.3)
RBC # BLD AUTO: 2.2 10E6/UL (ref 3.8–5.2)
RBC MORPH BLD: ABNORMAL
RETICS # AUTO: 0.58 10E6/UL (ref 0.03–0.1)
RETICS/RBC NFR AUTO: 26.5 % (ref 0.5–2)
SICKLE CELLS BLD QL SMEAR: ABNORMAL
SODIUM SERPL-SCNC: 138 MMOL/L (ref 133–144)
TARGETS BLD QL SMEAR: SLIGHT
WBC # BLD AUTO: 8.5 10E3/UL (ref 4–11)

## 2022-01-14 PROCEDURE — 250N000013 HC RX MED GY IP 250 OP 250 PS 637: Performed by: EMERGENCY MEDICINE

## 2022-01-14 PROCEDURE — 96361 HYDRATE IV INFUSION ADD-ON: CPT | Performed by: EMERGENCY MEDICINE

## 2022-01-14 PROCEDURE — 250N000011 HC RX IP 250 OP 636: Performed by: EMERGENCY MEDICINE

## 2022-01-14 PROCEDURE — 85027 COMPLETE CBC AUTOMATED: CPT | Performed by: EMERGENCY MEDICINE

## 2022-01-14 PROCEDURE — 85045 AUTOMATED RETICULOCYTE COUNT: CPT | Performed by: EMERGENCY MEDICINE

## 2022-01-14 PROCEDURE — 99285 EMERGENCY DEPT VISIT HI MDM: CPT | Performed by: EMERGENCY MEDICINE

## 2022-01-14 PROCEDURE — 99285 EMERGENCY DEPT VISIT HI MDM: CPT | Mod: 25 | Performed by: EMERGENCY MEDICINE

## 2022-01-14 PROCEDURE — 258N000003 HC RX IP 258 OP 636: Performed by: EMERGENCY MEDICINE

## 2022-01-14 PROCEDURE — 80048 BASIC METABOLIC PNL TOTAL CA: CPT | Performed by: EMERGENCY MEDICINE

## 2022-01-14 PROCEDURE — 96374 THER/PROPH/DIAG INJ IV PUSH: CPT | Performed by: EMERGENCY MEDICINE

## 2022-01-14 PROCEDURE — 36415 COLL VENOUS BLD VENIPUNCTURE: CPT | Performed by: EMERGENCY MEDICINE

## 2022-01-14 RX ORDER — POTASSIUM CHLORIDE 20MEQ/15ML
40 LIQUID (ML) ORAL ONCE
Status: COMPLETED | OUTPATIENT
Start: 2022-01-14 | End: 2022-01-14

## 2022-01-14 RX ORDER — OXYCODONE HYDROCHLORIDE 10 MG/1
20 TABLET ORAL ONCE
Status: COMPLETED | OUTPATIENT
Start: 2022-01-14 | End: 2022-01-14

## 2022-01-14 RX ORDER — POTASSIUM CHLORIDE 20MEQ/15ML
40 LIQUID (ML) ORAL ONCE
Status: DISCONTINUED | OUTPATIENT
Start: 2022-01-14 | End: 2022-01-14 | Stop reason: CLARIF

## 2022-01-14 RX ORDER — KETOROLAC TROMETHAMINE 15 MG/ML
15 INJECTION, SOLUTION INTRAMUSCULAR; INTRAVENOUS ONCE
Status: COMPLETED | OUTPATIENT
Start: 2022-01-14 | End: 2022-01-14

## 2022-01-14 RX ORDER — ONDANSETRON 2 MG/ML
4 INJECTION INTRAMUSCULAR; INTRAVENOUS EVERY 30 MIN PRN
Status: DISCONTINUED | OUTPATIENT
Start: 2022-01-14 | End: 2022-01-14 | Stop reason: HOSPADM

## 2022-01-14 RX ORDER — SODIUM CHLORIDE, SODIUM LACTATE, POTASSIUM CHLORIDE, CALCIUM CHLORIDE 600; 310; 30; 20 MG/100ML; MG/100ML; MG/100ML; MG/100ML
INJECTION, SOLUTION INTRAVENOUS CONTINUOUS
Status: DISCONTINUED | OUTPATIENT
Start: 2022-01-14 | End: 2022-01-14 | Stop reason: HOSPADM

## 2022-01-14 RX ORDER — HEPARIN SODIUM (PORCINE) LOCK FLUSH IV SOLN 100 UNIT/ML 100 UNIT/ML
5-10 SOLUTION INTRAVENOUS
Status: DISCONTINUED | OUTPATIENT
Start: 2022-01-14 | End: 2022-01-14 | Stop reason: HOSPADM

## 2022-01-14 RX ADMIN — HYDROMORPHONE HYDROCHLORIDE 1 MG: 1 INJECTION, SOLUTION INTRAMUSCULAR; INTRAVENOUS; SUBCUTANEOUS at 05:07

## 2022-01-14 RX ADMIN — SODIUM CHLORIDE, POTASSIUM CHLORIDE, SODIUM LACTATE AND CALCIUM CHLORIDE: 600; 310; 30; 20 INJECTION, SOLUTION INTRAVENOUS at 03:37

## 2022-01-14 RX ADMIN — POTASSIUM CHLORIDE 40 MEQ: 20 SOLUTION ORAL at 05:07

## 2022-01-14 RX ADMIN — HEPARIN 5 ML: 100 SYRINGE at 05:56

## 2022-01-14 RX ADMIN — KETOROLAC TROMETHAMINE 15 MG: 15 INJECTION, SOLUTION INTRAMUSCULAR; INTRAVENOUS at 03:36

## 2022-01-14 RX ADMIN — OXYCODONE HYDROCHLORIDE 20 MG: 10 TABLET ORAL at 03:35

## 2022-01-14 ASSESSMENT — ENCOUNTER SYMPTOMS
SHORTNESS OF BREATH: 0
BACK PAIN: 1
ABDOMINAL PAIN: 0
FEVER: 0

## 2022-01-14 NOTE — PROGRESS NOTES
ANTICOAGULATION  MANAGEMENT: Discharge Review    eJnnifer Cervantes chart reviewed for anticoagulation continuity of care    Emergency room visit on 1/14/22 for Sickle Cell Pain Crisis.    Discharge disposition: Home    Results:    Recent labs: (last 7 days)     01/10/22  1201 01/12/22  1441   INR 1.24* 1.22*     Anticoagulation inpatient management:     not applicable     Anticoagulation discharge instructions:     Warfarin dosing: not applicable    Bridging: No   INR goal change: No      Medication changes affecting anticoagulation: No    Additional factors affecting anticoagulation: No    Pain protocol initiated and labs that were ordered at baseline. No INR was done, but pt is scheduled to get an INR today and will follow-up then.    Plan     No adjustment to anticoagulation plan needed    Patient not contacted    No adjustment to Anticoagulation Calendar was required    Magy Roper RN

## 2022-01-14 NOTE — ED PROVIDER NOTES
ED Provider Note  [facility]      History     Chief Complaint   Patient presents with     Sickle Cell Pain Crisis     The history is provided by the patient and medical records.     Jennifer Cervantes is a 21 year old female with a past medical history significant for sickle cell anemia, cerebral infarction resulting in right sided hemiplegia and hemiparesis, PE anticoagulated on warfarin, and superficial vein thrombosis of right arm presenting to the ED with complaint of 10/10 lower back pain for 4 days consistent with previous sickle cell pain. She has tried oxycodone at home without relief. No fever.  No chest pain or shortness of breath.    Past Medical History  Past Medical History:   Diagnosis Date     Anxiety      Bleeding disorder (H)      Blood clotting disorder (H)      Cerebral infarction (H) 2015     Cognitive developmental delay     low IQ. 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 (H)     right hand contractures     Iron overload due to repeated red blood cell transfusions      Migraines      Multiple subsegmental pulmonary emboli without acute cor pulmonale (H) 02/01/2021     Oppositional defiant behavior      Superficial venous thrombosis of arm, right 03/25/2021     Uncomplicated asthma      Past Surgical History:   Procedure Laterality Date     AS INSERT TUNNELED CV 2 CATH W/O PORT/PUMP       CHOLECYSTECTOMY       CV RIGHT HEART CATH MEASUREMENTS RECORDED N/A 11/18/2021    Procedure: Right Heart Cath;  Surgeon: Jackson Stauffer MD;  Location:  HEART CARDIAC CATH LAB     INSERT PORT VASCULAR ACCESS Left 4/21/2021    Procedure: INSERTION, VASCULAR ACCESS PORT (NOT SURE ON SIDE UNTIL REMOVAL);  Surgeon: Rajan More MD;  Location: [facility] OR     IR CHEST PORT PLACEMENT > 5 YRS OF AGE  4/21/2021     IR CVC NON TUNNEL LINE REMOVAL  5/6/2021     IR  CVC NON TUNNEL PLACEMENT  04/07/2020     IR CVC NON TUNNEL PLACEMENT  4/30/2021     IR PORT REMOVAL LEFT  4/21/2021     REMOVE PORT VASCULAR ACCESS Left 4/21/2021    Procedure: REMOVAL, VASCULAR ACCESS PORT LEFT;  Surgeon: Rajan More MD;  Location: [facility] OR     REPAIR TENDON ELBOW Right 10/02/2019    Procedure: Right Elbow Flexor Lengthening, Flexor Pronator Slide Of Wrist and Finger, Thumb Adductor Lengthening;  Surgeon: Anai Franco MD;  [facility]: [facility]     TONSILLECTOMY Bilateral 10/02/2019    Procedure: Bilateral Tonsillectomy;  Surgeon: Farhana Guy MD;  [facility]: [facility]     ZZC BREAST REDUCTION (INCLUDES LIPO) TIER 3 Bilateral 04/23/2019     acetaminophen (TYLENOL) 325 MG tablet  albuterol (PROAIR HFA/PROVENTIL HFA/VENTOLIN HFA) 108 (90 Base) MCG/ACT inhaler  albuterol (PROVENTIL) (2.5 MG/3ML) 0.083% neb solution  ARIPiprazole (ABILIFY) 2 MG tablet  budesonide-formoterol (SYMBICORT) 160-4.5 MCG/ACT Inhaler  cefdinir (OMNICEF) 300 MG capsule  deferasirox (JADENU) 360 MG tablet  diphenhydrAMINE (BENADRYL) 25 MG capsule  EPINEPHrine (ANY BX GENERIC EQUIV) 0.3 MG/0.3ML injection 2-pack  Hydroxyurea 1000 MG TABS  hydrOXYzine (ATARAX) 25 MG tablet  lidocaine-prilocaine (EMLA) 2.5-2.5 % external cream  medroxyPROGESTERone (DEPO-PROVERA) 150 MG/ML IM injection  morphine (MS CONTIN) 15 MG CR tablet  naloxone (NARCAN) 4 MG/0.1ML nasal spray  omeprazole (PRILOSEC) 20 MG DR capsule  ondansetron (ZOFRAN) 8 MG tablet  oxyCODONE IR (ROXICODONE) 15 MG tablet  sertraline (ZOLOFT) 100 MG tablet  warfarin ANTICOAGULANT (COUMADIN) 5 MG tablet  warfarin ANTICOAGULANT (COUMADIN) 5 MG tablet  warfarin ANTICOAGULANT (COUMADIN) 7.5 MG tablet      Allergies   Allergen Reactions     Contrast Dye      Hives and breathing issues     Fish-Derived Products Hives     Seafood Hives     Diagnostic X-Ray Materials      Gadolinium      Family History  Family History   Problem Relation Age of Onset     Sickle Cell Trait  Mother      Hypertension Mother      Asthma Mother      Sickle Cell Trait Father      Social History   Social History     Tobacco Use     Smoking status: Never Smoker     Smokeless tobacco: Never Used   Substance Use Topics     Alcohol use: Not Currently     Alcohol/week: 0.0 standard drinks     Drug use: Never      Past medical history, past surgical history, medications, allergies, family history, and social history were reviewed with the patient. No additional pertinent items.       Review of Systems   Constitutional: Negative for fever.   Respiratory: Negative for shortness of breath.    Cardiovascular: Negative for chest pain.   Gastrointestinal: Negative for abdominal pain.   Musculoskeletal: Positive for back pain (lower).   All other systems reviewed and are negative.    A complete review of systems was performed with pertinent positives and negatives noted in the HPI, and all other systems negative.    Physical Exam   BP: 130/78  Pulse: 102  Temp: 98.6  F (37  C)  Resp: 16  SpO2: 93 %  Physical Exam  Constitutional:       General: She is not in acute distress.     Appearance: She is not diaphoretic.   HENT:      Head: Normocephalic.      Mouth/Throat:      Pharynx: No oropharyngeal exudate.   Eyes:      Extraocular Movements: Extraocular movements intact.   Cardiovascular:      Rate and Rhythm: Normal rate and regular rhythm.      Heart sounds: Normal heart sounds.   Pulmonary:      Effort: No respiratory distress.      Breath sounds: Normal breath sounds.   Abdominal:      General: There is no distension.      Palpations: Abdomen is soft.      Tenderness: There is no abdominal tenderness.   Musculoskeletal:         General: No deformity.      Cervical back: Neck supple.      Comments: Diffuse low back tenderness   Skin:     General: Skin is warm and dry.   Neurological:      Mental Status: She is alert.      Comments: alert   Psychiatric:         Behavior: Behavior normal.         ED Course     "  Procedures         Results for orders placed or performed during the hospital encounter of 01/14/22   Basic metabolic panel     Status: Abnormal   Result Value Ref Range    Sodium 138 133 - 144 mmol/L    Potassium 3.0 (L) 3.4 - 5.3 mmol/L    Chloride 109 94 - 109 mmol/L    Carbon Dioxide (CO2) 22 20 - 32 mmol/L    Anion Gap 7 3 - 14 mmol/L    Urea Nitrogen 6 (L) 7 - 30 mg/dL    Creatinine 0.48 (L) 0.52 - 1.04 mg/dL    Calcium 8.6 8.5 - 10.1 mg/dL    Glucose 103 (H) 70 - 99 mg/dL    GFR Estimate >90 >60 mL/min/1.73m2   Reticulocyte count     Status: Abnormal   Result Value Ref Range    % Reticulocyte 26.5 (H) 0.5 - 2.0 %    Absolute Reticulocyte 0.583 (H) 0.025 - 0.095 10e6/uL   CBC with platelets and differential     Status: Abnormal   Result Value Ref Range    WBC Count 8.5 4.0 - 11.0 10e3/uL    RBC Count 2.20 (L) 3.80 - 5.20 10e6/uL    Hemoglobin 6.9 (LL) 11.7 - 15.7 g/dL    Hematocrit 20.3 (L) 35.0 - 47.0 %    MCV 92 78 - 100 fL    MCH 31.4 26.5 - 33.0 pg    MCHC 34.0 31.5 - 36.5 g/dL    RDW 28.1 (H) 10.0 - 15.0 %    Platelet Count 385 150 - 450 10e3/uL    Narrative    Previously reported component [ NRBCs ] is no longer reported.\"  Previously reported component [ NRBCs Absolute ] is no longer reported.\"   Manual Differential     Status: Abnormal   Result Value Ref Range    % Neutrophils 52 %    % Lymphocytes 40 %    % Monocytes 5 %    % Eosinophils 0 %    % Basophils 1 %    % Metamyelocytes 1 %    % Myelocytes 1 %    NRBCs per 100  (H) <=0 %    Absolute Neutrophils 4.4 1.6 - 8.3 10e3/uL    Absolute Lymphocytes 3.4 0.8 - 5.3 10e3/uL    Absolute Monocytes 0.4 0.0 - 1.3 10e3/uL    Absolute Eosinophils 0.0 0.0 - 0.7 10e3/uL    Absolute Basophils 0.1 0.0 - 0.2 10e3/uL    Absolute Metamyelocytes 0.1 (H) <=0.0 10e3/uL    Absolute Myelocytes 0.1 (H) <=0.0 10e3/uL    Absolute NRBCs 22.5 (H) <=0.0 10e3/uL    RBC Morphology Confirmed RBC Indices     Platelet Assessment  Automated Count Confirmed. Platelet " morphology is normal.     Automated Count Confirmed. Platelet morphology is normal.    Elliptocytes Marked (A) None Seen    Polychromasia Slight (A) None Seen    Sickle Cells Moderate (A) None Seen    Target Cells Slight (A) None Seen   CBC with platelets differential     Status: Abnormal    Narrative    The following orders were created for panel order CBC with platelets differential.  Procedure                               Abnormality         Status                     ---------                               -----------         ------                     CBC with platelets and d...[[id number]]  Abnormal            Final result               Manual Differential[[id number]]          Abnormal            Final result                 Please view results for these tests on the individual orders.          Results for orders placed or performed during the hospital encounter of 01/14/22   Basic metabolic panel     Status: Abnormal   Result Value Ref Range    Sodium 138 133 - 144 mmol/L    Potassium 3.0 (L) 3.4 - 5.3 mmol/L    Chloride 109 94 - 109 mmol/L    Carbon Dioxide (CO2) 22 20 - 32 mmol/L    Anion Gap 7 3 - 14 mmol/L    Urea Nitrogen 6 (L) 7 - 30 mg/dL    Creatinine 0.48 (L) 0.52 - 1.04 mg/dL    Calcium 8.6 8.5 - 10.1 mg/dL    Glucose 103 (H) 70 - 99 mg/dL    GFR Estimate >90 >60 mL/min/1.73m2   Reticulocyte count     Status: Abnormal   Result Value Ref Range    % Reticulocyte 26.5 (H) 0.5 - 2.0 %    Absolute Reticulocyte 0.583 (H) 0.025 - 0.095 10e6/uL   CBC with platelets and differential     Status: Abnormal   Result Value Ref Range    WBC Count 8.5 4.0 - 11.0 10e3/uL    RBC Count 2.20 (L) 3.80 - 5.20 10e6/uL    Hemoglobin 6.9 (LL) 11.7 - 15.7 g/dL    Hematocrit 20.3 (L) 35.0 - 47.0 %    MCV 92 78 - 100 fL    MCH 31.4 26.5 - 33.0 pg    MCHC 34.0 31.5 - 36.5 g/dL    RDW 28.1 (H) 10.0 - 15.0 %    Platelet Count 385 150 - 450 10e3/uL    Narrative    Previously reported component [ NRBCs ] is no longer  "reported.\"  Previously reported component [ NRBCs Absolute ] is no longer reported.\"   Manual Differential     Status: Abnormal   Result Value Ref Range    % Neutrophils 52 %    % Lymphocytes 40 %    % Monocytes 5 %    % Eosinophils 0 %    % Basophils 1 %    % Metamyelocytes 1 %    % Myelocytes 1 %    NRBCs per 100  (H) <=0 %    Absolute Neutrophils 4.4 1.6 - 8.3 10e3/uL    Absolute Lymphocytes 3.4 0.8 - 5.3 10e3/uL    Absolute Monocytes 0.4 0.0 - 1.3 10e3/uL    Absolute Eosinophils 0.0 0.0 - 0.7 10e3/uL    Absolute Basophils 0.1 0.0 - 0.2 10e3/uL    Absolute Metamyelocytes 0.1 (H) <=0.0 10e3/uL    Absolute Myelocytes 0.1 (H) <=0.0 10e3/uL    Absolute NRBCs 22.5 (H) <=0.0 10e3/uL    RBC Morphology Confirmed RBC Indices     Platelet Assessment  Automated Count Confirmed. Platelet morphology is normal.     Automated Count Confirmed. Platelet morphology is normal.    Elliptocytes Marked (A) None Seen    Polychromasia Slight (A) None Seen    Sickle Cells Moderate (A) None Seen    Target Cells Slight (A) None Seen   CBC with platelets differential     Status: Abnormal    Narrative    The following orders were created for panel order CBC with platelets differential.  Procedure                               Abnormality         Status                     ---------                               -----------         ------                     CBC with platelets and d...[[id number]]  Abnormal            Final result               Manual Differential[[id number]]          Abnormal            Final result                 Please view results for these tests on the individual orders.     Medications   lactated ringers infusion ( Intravenous New Bag 1/14/22 0337)   ondansetron (ZOFRAN) injection 4 mg (has no administration in time range)   potassium chloride (KAYCIEL) solution 40 mEq (has no administration in time range)   ketorolac (TORADOL) injection 15 mg (15 mg Intravenous Given 1/14/22 0336)   oxyCODONE IR (ROXICODONE) " tablet 20 mg (20 mg Oral Given 1/14/22 0335)   HYDROmorphone (DILAUDID) injection 1 mg (1 mg Intravenous Given 1/14/22 5524)        Assessments & Plan (with Medical Decision Making)   21-year-old female presents to us with a chief complaint of sickle cell pain.  She is afebrile vital signs are otherwise nonconcerning.  Labs were ordered and her usual pain protocol was initiated.  Labs ordered baseline.  She is doing better at this point.  We will discharge her home.    I have reviewed the nursing notes. I have reviewed the findings, diagnosis, plan and need for follow up with the patient.    New Prescriptions    No medications on file       Final diagnoses:   Sickle cell pain crisis (H)   I, Ashley Johnson, am serving as a trained medical scribe to document services personally performed by Mykel Luz DO, based on the provider's statements to me.     I, Mykel Luz DO, was physically present and have reviewed and verified the accuracy of this note documented by Ashley Johnson.      --  Mykel Luz DO  [facility] EMERGENCY DEPARTMENT  1/14/2022     Mykel Luz DO  01/14/22 0534       Mykel Luz DO  01/14/22 0534

## 2022-01-14 NOTE — ED TRIAGE NOTES
Patient reports sickle pain pain in lower back, mostly in spine. Has been having pain for four days. Patient reports trying oxycodone which was ineffective.

## 2022-01-16 ENCOUNTER — HOSPITAL ENCOUNTER (EMERGENCY)
Facility: CLINIC | Age: 22
Discharge: HOME | End: 2022-01-16
Attending: EMERGENCY MEDICINE | Admitting: EMERGENCY MEDICINE
Payer: COMMERCIAL

## 2022-01-16 VITALS
HEART RATE: 104 BPM | DIASTOLIC BLOOD PRESSURE: 66 MMHG | TEMPERATURE: 99.6 F | OXYGEN SATURATION: 95 % | SYSTOLIC BLOOD PRESSURE: 114 MMHG | RESPIRATION RATE: 15 BRPM

## 2022-01-16 DIAGNOSIS — D57.00 SICKLE CELL PAIN CRISIS (H): ICD-10-CM

## 2022-01-16 DIAGNOSIS — E83.111 IRON OVERLOAD DUE TO REPEATED RED BLOOD CELL TRANSFUSIONS: ICD-10-CM

## 2022-01-16 DIAGNOSIS — D57.00 SICKLE CELL DISEASE WITH CRISIS (H): ICD-10-CM

## 2022-01-16 LAB
ALBUMIN SERPL-MCNC: 3.7 G/DL (ref 3.4–5)
ALP SERPL-CCNC: 58 U/L (ref 40–150)
ALT SERPL W P-5'-P-CCNC: 41 U/L (ref 0–50)
ANION GAP SERPL CALCULATED.3IONS-SCNC: 9 MMOL/L (ref 3–14)
ANION GAP SERPL CALCULATED.3IONS-SCNC: 9 MMOL/L (ref 3–14)
AST SERPL W P-5'-P-CCNC: 52 U/L (ref 0–45)
BASOPHILS # BLD MANUAL: 0 10E3/UL (ref 0–0.2)
BASOPHILS NFR BLD MANUAL: 0 %
BILIRUB SERPL-MCNC: 2.8 MG/DL (ref 0.2–1.3)
BUN SERPL-MCNC: 5 MG/DL (ref 7–30)
BUN SERPL-MCNC: 6 MG/DL (ref 7–30)
CALCIUM SERPL-MCNC: 8.4 MG/DL (ref 8.5–10.1)
CALCIUM SERPL-MCNC: 8.5 MG/DL (ref 8.5–10.1)
CHLORIDE BLD-SCNC: 111 MMOL/L (ref 94–109)
CHLORIDE BLD-SCNC: 111 MMOL/L (ref 94–109)
CO2 SERPL-SCNC: 19 MMOL/L (ref 20–32)
CO2 SERPL-SCNC: 20 MMOL/L (ref 20–32)
CREAT SERPL-MCNC: 0.45 MG/DL (ref 0.52–1.04)
CREAT SERPL-MCNC: 0.48 MG/DL (ref 0.52–1.04)
ELLIPTOCYTES BLD QL SMEAR: SLIGHT
EOSINOPHIL # BLD MANUAL: 0.2 10E3/UL (ref 0–0.7)
EOSINOPHIL NFR BLD MANUAL: 2 %
ERYTHROCYTE [DISTWIDTH] IN BLOOD BY AUTOMATED COUNT: 23.5 % (ref 10–15)
GFR SERPL CREATININE-BSD FRML MDRD: >90 ML/MIN/1.73M2
GFR SERPL CREATININE-BSD FRML MDRD: >90 ML/MIN/1.73M2
GLUCOSE BLD-MCNC: 100 MG/DL (ref 70–99)
GLUCOSE BLD-MCNC: 95 MG/DL (ref 70–99)
HCG SERPL QL: NEGATIVE
HCT VFR BLD AUTO: 20.1 % (ref 35–47)
HGB BLD-MCNC: 6.6 G/DL (ref 11.7–15.7)
LYMPHOCYTES # BLD MANUAL: 0.9 10E3/UL (ref 0.8–5.3)
LYMPHOCYTES NFR BLD MANUAL: 11 %
MCH RBC QN AUTO: 30.7 PG (ref 26.5–33)
MCHC RBC AUTO-ENTMCNC: 32.8 G/DL (ref 31.5–36.5)
MCV RBC AUTO: 94 FL (ref 78–100)
MONOCYTES # BLD MANUAL: 0.4 10E3/UL (ref 0–1.3)
MONOCYTES NFR BLD MANUAL: 5 %
NEUTROPHILS # BLD MANUAL: 6.6 10E3/UL (ref 1.6–8.3)
NEUTROPHILS NFR BLD MANUAL: 82 %
NRBC # BLD AUTO: 19.8 10E3/UL
NRBC BLD MANUAL-RTO: 248 %
PLAT MORPH BLD: ABNORMAL
PLATELET # BLD AUTO: 375 10E3/UL (ref 150–450)
POTASSIUM BLD-SCNC: 3.4 MMOL/L (ref 3.4–5.3)
POTASSIUM BLD-SCNC: 3.4 MMOL/L (ref 3.4–5.3)
PROT SERPL-MCNC: 7.5 G/DL (ref 6.8–8.8)
RBC # BLD AUTO: 2.15 10E6/UL (ref 3.8–5.2)
RBC MORPH BLD: ABNORMAL
RETICS # AUTO: 0.4 10E6/UL (ref 0.03–0.1)
RETICS/RBC NFR AUTO: 18.8 % (ref 0.5–2)
SICKLE CELLS BLD QL SMEAR: ABNORMAL
SODIUM SERPL-SCNC: 139 MMOL/L (ref 133–144)
SODIUM SERPL-SCNC: 140 MMOL/L (ref 133–144)
WBC # BLD AUTO: 8 10E3/UL (ref 4–11)

## 2022-01-16 PROCEDURE — 250N000011 HC RX IP 250 OP 636: Performed by: EMERGENCY MEDICINE

## 2022-01-16 PROCEDURE — 82310 ASSAY OF CALCIUM: CPT | Performed by: EMERGENCY MEDICINE

## 2022-01-16 PROCEDURE — 99285 EMERGENCY DEPT VISIT HI MDM: CPT | Mod: 25 | Performed by: EMERGENCY MEDICINE

## 2022-01-16 PROCEDURE — 258N000003 HC RX IP 258 OP 636: Performed by: EMERGENCY MEDICINE

## 2022-01-16 PROCEDURE — 96361 HYDRATE IV INFUSION ADD-ON: CPT | Performed by: EMERGENCY MEDICINE

## 2022-01-16 PROCEDURE — 99285 EMERGENCY DEPT VISIT HI MDM: CPT | Performed by: EMERGENCY MEDICINE

## 2022-01-16 PROCEDURE — 250N000013 HC RX MED GY IP 250 OP 250 PS 637: Performed by: EMERGENCY MEDICINE

## 2022-01-16 PROCEDURE — 36415 COLL VENOUS BLD VENIPUNCTURE: CPT | Performed by: EMERGENCY MEDICINE

## 2022-01-16 PROCEDURE — 85027 COMPLETE CBC AUTOMATED: CPT | Performed by: EMERGENCY MEDICINE

## 2022-01-16 PROCEDURE — 250N000009 HC RX 250: Performed by: EMERGENCY MEDICINE

## 2022-01-16 PROCEDURE — 96375 TX/PRO/DX INJ NEW DRUG ADDON: CPT | Performed by: EMERGENCY MEDICINE

## 2022-01-16 PROCEDURE — 84703 CHORIONIC GONADOTROPIN ASSAY: CPT | Performed by: EMERGENCY MEDICINE

## 2022-01-16 PROCEDURE — 85045 AUTOMATED RETICULOCYTE COUNT: CPT | Performed by: EMERGENCY MEDICINE

## 2022-01-16 PROCEDURE — 96374 THER/PROPH/DIAG INJ IV PUSH: CPT | Performed by: EMERGENCY MEDICINE

## 2022-01-16 RX ORDER — KETOROLAC TROMETHAMINE 15 MG/ML
15 INJECTION, SOLUTION INTRAMUSCULAR; INTRAVENOUS ONCE
Status: COMPLETED | OUTPATIENT
Start: 2022-01-16 | End: 2022-01-16

## 2022-01-16 RX ORDER — SODIUM CHLORIDE 9 MG/ML
INJECTION, SOLUTION INTRAVENOUS CONTINUOUS
Status: DISCONTINUED | OUTPATIENT
Start: 2022-01-16 | End: 2022-01-16

## 2022-01-16 RX ORDER — HEPARIN SODIUM (PORCINE) LOCK FLUSH IV SOLN 100 UNIT/ML 100 UNIT/ML
5-10 SOLUTION INTRAVENOUS
Status: DISCONTINUED | OUTPATIENT
Start: 2022-01-16 | End: 2022-01-16 | Stop reason: HOSPADM

## 2022-01-16 RX ADMIN — SODIUM CHLORIDE, POTASSIUM CHLORIDE, SODIUM LACTATE AND CALCIUM CHLORIDE 1000 ML: 600; 310; 30; 20 INJECTION, SOLUTION INTRAVENOUS at 02:54

## 2022-01-16 RX ADMIN — Medication 4 MG: at 07:19

## 2022-01-16 RX ADMIN — OXYCODONE HYDROCHLORIDE 15 MG: 5 TABLET ORAL at 04:36

## 2022-01-16 RX ADMIN — KETOROLAC TROMETHAMINE 15 MG: 15 INJECTION, SOLUTION INTRAMUSCULAR; INTRAVENOUS at 04:36

## 2022-01-16 RX ADMIN — HEPARIN 5 ML: 100 SYRINGE at 08:29

## 2022-01-16 NOTE — ED TRIAGE NOTES
Pt arrived to ed from home for generalized pain since noon.  Pt pain med (oxycodone) and warm blankets at home with no relief.

## 2022-01-16 NOTE — ED PROVIDER NOTES
"ED Provider Note  [facility]      History     Chief Complaint   Patient presents with     Sickle Cell Pain Crisis     HPI  Jennifer Cervantes is a 21 year old female with a past medical history significant for sickle cell anemia, cerebral infarction resulting in right sided hemiplegia and hemiparesis, PE anticoagulated on warfarin presents with \"generalized whole body pain\".  Patient reports that her pain started yesterday around noon and has not responded to her outpatient oral oxycodone.  She noted since her extremities, back and feels consistent with her typical sickle cell pain crises.  She specifically denies any chest pain, no shortness of breath, no productive cough, or other URI symptoms.  She denies fever, abdominal pain or urinary pain.  She has no headaches and denies any new neurologic deficits.    Past Medical History  Past Medical History:   Diagnosis Date     Anxiety      Bleeding disorder (H)      Blood clotting disorder (H)      Cerebral infarction (H) 2015     Cognitive developmental delay     low IQ. 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 (H)     right hand contractures     Iron overload due to repeated red blood cell transfusions      Migraines      Multiple subsegmental pulmonary emboli without acute cor pulmonale (H) 02/01/2021     Oppositional defiant behavior      Superficial venous thrombosis of arm, right 03/25/2021     Uncomplicated asthma      Past Surgical History:   Procedure Laterality Date     AS INSERT TUNNELED CV 2 CATH W/O PORT/PUMP       CHOLECYSTECTOMY       CV RIGHT HEART CATH MEASUREMENTS RECORDED N/A 11/18/2021    Procedure: Right Heart Cath;  Surgeon: Jackson Stauffer MD;  Location:  HEART CARDIAC CATH LAB     INSERT PORT VASCULAR ACCESS Left 4/21/2021    Procedure: INSERTION, VASCULAR ACCESS PORT (NOT SURE ON SIDE " UNTIL REMOVAL);  Surgeon: Rajan More MD;  Location: [facility] OR     IR CHEST PORT PLACEMENT > 5 YRS OF AGE  4/21/2021     IR CVC NON TUNNEL LINE REMOVAL  5/6/2021     IR CVC NON TUNNEL PLACEMENT  04/07/2020     IR CVC NON TUNNEL PLACEMENT  4/30/2021     IR PORT REMOVAL LEFT  4/21/2021     REMOVE PORT VASCULAR ACCESS Left 4/21/2021    Procedure: REMOVAL, VASCULAR ACCESS PORT LEFT;  Surgeon: Rajan More MD;  Location: [facility] OR     REPAIR TENDON ELBOW Right 10/02/2019    Procedure: Right Elbow Flexor Lengthening, Flexor Pronator Slide Of Wrist and Finger, Thumb Adductor Lengthening;  Surgeon: Anai Franco MD;  [facility]     TONSILLECTOMY Bilateral 10/02/2019    Procedure: Bilateral Tonsillectomy;  Surgeon: Farhana Guy MD;  [facility]: [facility] OR     ZZC BREAST REDUCTION (INCLUDES LIPO) TIER 3 Bilateral 04/23/2019     acetaminophen (TYLENOL) 325 MG tablet  albuterol (PROAIR HFA/PROVENTIL HFA/VENTOLIN HFA) 108 (90 Base) MCG/ACT inhaler  albuterol (PROVENTIL) (2.5 MG/3ML) 0.083% neb solution  ARIPiprazole (ABILIFY) 2 MG tablet  budesonide-formoterol (SYMBICORT) 160-4.5 MCG/ACT Inhaler  cefdinir (OMNICEF) 300 MG capsule  deferasirox (JADENU) 360 MG tablet  diphenhydrAMINE (BENADRYL) 25 MG capsule  EPINEPHrine (ANY BX GENERIC EQUIV) 0.3 MG/0.3ML injection 2-pack  Hydroxyurea 1000 MG TABS  hydrOXYzine (ATARAX) 25 MG tablet  lidocaine-prilocaine (EMLA) 2.5-2.5 % external cream  medroxyPROGESTERone (DEPO-PROVERA) 150 MG/ML IM injection  morphine (MS CONTIN) 15 MG CR tablet  naloxone (NARCAN) 4 MG/0.1ML nasal spray  omeprazole (PRILOSEC) 20 MG DR capsule  ondansetron (ZOFRAN) 8 MG tablet  oxyCODONE IR (ROXICODONE) 15 MG tablet  sertraline (ZOLOFT) 100 MG tablet  warfarin ANTICOAGULANT (COUMADIN) 5 MG tablet  warfarin ANTICOAGULANT (COUMADIN) 5 MG tablet  warfarin ANTICOAGULANT (COUMADIN) 7.5 MG tablet      Allergies   Allergen Reactions     Contrast Dye      Hives and breathing issues     Fish-Derived  Products Hives     Seafood Hives     Diagnostic X-Ray Materials      Gadolinium      Family History  Family History   Problem Relation Age of Onset     Sickle Cell Trait Mother      Hypertension Mother      Asthma Mother      Sickle Cell Trait Father      Social History   Social History     Tobacco Use     Smoking status: Never Smoker     Smokeless tobacco: Never Used   Substance Use Topics     Alcohol use: Not Currently     Alcohol/week: 0.0 standard drinks     Drug use: Never      Past medical history, past surgical history, medications, allergies, family history, and social history were reviewed with the patient. No additional pertinent items.       Review of Systems  A complete review of systems was performed with pertinent positives and negatives noted in the HPI, and all other systems negative.    Physical Exam   BP: 125/85  Pulse: 110  Temp: 99.6  F (37.6  C)  Resp: 16  SpO2: 95 %  Physical Exam  General: awake, alert, NAD  Head: normal cephalic  HEENT: pupils equal, conjugate gaze intact  Neck: Supple  CV: Mild tachycardic rate, no murmurs  Lungs: clear to auscultation  Abd: soft, non-tender, no guarding, no peritoneal signs  EXT: lower extremities without swelling or edema  Neuro: awake, answers questions appropriately.  Patient noted to have some right-sided weakness when ambulating.      ED Course      Procedures       The medical record was reviewed and interpreted.  Current labs reviewed and interpreted.  Previous labs reviewed and interpreted.              Results for orders placed or performed during the hospital encounter of 01/16/22   Basic metabolic panel     Status: Abnormal   Result Value Ref Range    Sodium 140 133 - 144 mmol/L    Potassium 3.4 3.4 - 5.3 mmol/L    Chloride 111 (H) 94 - 109 mmol/L    Carbon Dioxide (CO2) 20 20 - 32 mmol/L    Anion Gap 9 3 - 14 mmol/L    Urea Nitrogen 6 (L) 7 - 30 mg/dL    Creatinine 0.48 (L) 0.52 - 1.04 mg/dL    Calcium 8.5 8.5 - 10.1 mg/dL    Glucose 100 (H) 70  "- 99 mg/dL    GFR Estimate >90 >60 mL/min/1.73m2   Reticulocyte count     Status: Abnormal   Result Value Ref Range    % Reticulocyte 18.8 (H) 0.5 - 2.0 %    Absolute Reticulocyte 0.403 (H) 0.025 - 0.095 10e6/uL   HCG qualitative Blood     Status: Normal   Result Value Ref Range    hCG Serum Qualitative Negative Negative   CBC with platelets and differential     Status: Abnormal   Result Value Ref Range    WBC Count 8.0 4.0 - 11.0 10e3/uL    RBC Count 2.15 (L) 3.80 - 5.20 10e6/uL    Hemoglobin 6.6 (LL) 11.7 - 15.7 g/dL    Hematocrit 20.1 (L) 35.0 - 47.0 %    MCV 94 78 - 100 fL    MCH 30.7 26.5 - 33.0 pg    MCHC 32.8 31.5 - 36.5 g/dL    RDW 23.5 (H) 10.0 - 15.0 %    Platelet Count 375 150 - 450 10e3/uL    Narrative    Previously reported component [ NRBCs ] is no longer reported.\"  Previously reported component [ NRBCs Absolute ] is no longer reported.\"   Manual Differential     Status: Abnormal   Result Value Ref Range    % Neutrophils 82 %    % Lymphocytes 11 %    % Monocytes 5 %    % Eosinophils 2 %    % Basophils 0 %    NRBCs per 100  (H) <=0 %    Absolute Neutrophils 6.6 1.6 - 8.3 10e3/uL    Absolute Lymphocytes 0.9 0.8 - 5.3 10e3/uL    Absolute Monocytes 0.4 0.0 - 1.3 10e3/uL    Absolute Eosinophils 0.2 0.0 - 0.7 10e3/uL    Absolute Basophils 0.0 0.0 - 0.2 10e3/uL    Absolute NRBCs 19.8 (H) <=0.0 10e3/uL    RBC Morphology Confirmed RBC Indices     Platelet Assessment  Automated Count Confirmed. Platelet morphology is normal.     Automated Count Confirmed. Platelet morphology is normal.    Elliptocytes Slight (A) None Seen    Sickle Cells Moderate (A) None Seen   CBC with platelets differential     Status: Abnormal    Narrative    The following orders were created for panel order CBC with platelets differential.  Procedure                               Abnormality         Status                     ---------                               -----------         ------                     CBC with platelets " and d...[[id number]]  Abnormal            Final result               Manual Differential[[id number]]          Abnormal            Final result                 Please view results for these tests on the individual orders.     Medications   lactated ringers BOLUS 1,000 mL (0 mLs Intravenous Stopped 1/16/22 0441)   ketorolac (TORADOL) injection 15 mg (15 mg Intravenous Given 1/16/22 0436)   oxyCODONE (ROXICODONE) tablet 15 mg (15 mg Oral Given 1/16/22 0436)   ketamine (KETALAR) injection 4 mg (4 mg Intravenous Given 1/16/22 5419)        Assessments & Plan (with Medical Decision Making)   Gil is a 21-year-old female who presents to the emergency department with whole body pain consistent with reported sickle cell pain crises.    She is nontoxic-appearing, mildly tachycardic but otherwise normal vital signs.  She has a normal heart and lung exam.  She denies any signs or symptoms concerning for acute chest.  Denies any new neurologic changes.  Per chart review patient appears to be consistently tachycardic when presenting to the emergency department.  Without other accompanying chest symptoms such as shortness of breath, chest pain or fever do not feel that chest imaging or additional evaluation is warranted at this time.    Will treat pain per her ED treatment plan which includes Toradol, IV fluids, and oral oxycodone.  Will obtain basic labs and reassess.    Patient's reticulocyte count is appropriately elevated, her hemoglobin is baseline.  Her electrolytes are unremarkable.  She did not report any significant pain improvement with the home oxycodone but would like to be discharged home.  He reported that occasionally ketamine helps, will try 4 mg of ketamine.  She continues denying chest pain shortness of breath or other URI or chest complaints.    After IV Ketamine, she was feeling better and wanted to discharge.  She was encouraged to return to the ER for any new or worsening symptoms.    I have reviewed the  nursing notes. I have reviewed the findings, diagnosis, plan and need for follow up with the patient.    New Prescriptions    No medications on file       Final diagnoses:   Sickle cell pain crisis (H)       --  Kilo CID [facility] EMERGENCY DEPARTMENT  1/16/2022     Kilo Dai MD  01/16/22 0804

## 2022-01-17 ENCOUNTER — ANTICOAGULATION THERAPY VISIT (OUTPATIENT)
Dept: ANTICOAGULATION | Facility: CLINIC | Age: 22
End: 2022-01-17

## 2022-01-17 ENCOUNTER — TELEPHONE (OUTPATIENT)
Dept: INTERNAL MEDICINE | Facility: CLINIC | Age: 22
End: 2022-01-17

## 2022-01-17 ENCOUNTER — ONCOLOGY VISIT (OUTPATIENT)
Dept: ONCOLOGY | Facility: CLINIC | Age: 22
End: 2022-01-17
Attending: PEDIATRICS
Payer: COMMERCIAL

## 2022-01-17 ENCOUNTER — TELEPHONE (OUTPATIENT)
Dept: ONCOLOGY | Facility: CLINIC | Age: 22
End: 2022-01-17

## 2022-01-17 ENCOUNTER — LAB (OUTPATIENT)
Dept: LAB | Facility: CLINIC | Age: 22
End: 2022-01-17
Attending: PEDIATRICS
Payer: COMMERCIAL

## 2022-01-17 ENCOUNTER — PATIENT OUTREACH (OUTPATIENT)
Dept: ONCOLOGY | Facility: CLINIC | Age: 22
End: 2022-01-17
Payer: COMMERCIAL

## 2022-01-17 VITALS
BODY MASS INDEX: 28.46 KG/M2 | TEMPERATURE: 97.7 F | RESPIRATION RATE: 14 BRPM | SYSTOLIC BLOOD PRESSURE: 135 MMHG | HEART RATE: 110 BPM | DIASTOLIC BLOOD PRESSURE: 83 MMHG | OXYGEN SATURATION: 96 % | WEIGHT: 165.8 LBS

## 2022-01-17 DIAGNOSIS — D57.1 HB-SS DISEASE WITHOUT CRISIS (H): ICD-10-CM

## 2022-01-17 DIAGNOSIS — I27.82 CHRONIC PULMONARY EMBOLISM WITHOUT ACUTE COR PULMONALE, UNSPECIFIED PULMONARY EMBOLISM TYPE (H): Primary | ICD-10-CM

## 2022-01-17 DIAGNOSIS — Z20.822 EXPOSURE TO 2019 NOVEL CORONAVIRUS: ICD-10-CM

## 2022-01-17 DIAGNOSIS — J45.909 ASTHMATIC BRONCHITIS WITHOUT COMPLICATION, UNSPECIFIED ASTHMA SEVERITY, UNSPECIFIED WHETHER PERSISTENT: ICD-10-CM

## 2022-01-17 DIAGNOSIS — I27.82 CHRONIC PULMONARY EMBOLISM WITHOUT ACUTE COR PULMONALE, UNSPECIFIED PULMONARY EMBOLISM TYPE (H): ICD-10-CM

## 2022-01-17 DIAGNOSIS — D57.00 SICKLE CELL PAIN CRISIS (H): ICD-10-CM

## 2022-01-17 DIAGNOSIS — D57.00 SICKLE CELL DISEASE WITH CRISIS (H): Primary | ICD-10-CM

## 2022-01-17 DIAGNOSIS — E83.111 IRON OVERLOAD DUE TO REPEATED RED BLOOD CELL TRANSFUSIONS: ICD-10-CM

## 2022-01-17 DIAGNOSIS — Z95.828 PORT-A-CATH IN PLACE: ICD-10-CM

## 2022-01-17 LAB
ALBUMIN SERPL-MCNC: 3.9 G/DL (ref 3.4–5)
ALP SERPL-CCNC: 61 U/L (ref 40–150)
ALT SERPL W P-5'-P-CCNC: 44 U/L (ref 0–50)
ANION GAP SERPL CALCULATED.3IONS-SCNC: 10 MMOL/L (ref 3–14)
AST SERPL W P-5'-P-CCNC: 46 U/L (ref 0–45)
BASOPHILS # BLD MANUAL: 0 10E3/UL (ref 0–0.2)
BASOPHILS NFR BLD MANUAL: 0 %
BILIRUB SERPL-MCNC: 2.2 MG/DL (ref 0.2–1.3)
BUN SERPL-MCNC: 7 MG/DL (ref 7–30)
CALCIUM SERPL-MCNC: 8.8 MG/DL (ref 8.5–10.1)
CHLORIDE BLD-SCNC: 113 MMOL/L (ref 94–109)
CO2 SERPL-SCNC: 20 MMOL/L (ref 20–32)
CREAT SERPL-MCNC: 0.45 MG/DL (ref 0.52–1.04)
EOSINOPHIL # BLD MANUAL: 0.2 10E3/UL (ref 0–0.7)
EOSINOPHIL NFR BLD MANUAL: 2 %
ERYTHROCYTE [DISTWIDTH] IN BLOOD BY AUTOMATED COUNT: 21.2 % (ref 10–15)
GFR SERPL CREATININE-BSD FRML MDRD: >90 ML/MIN/1.73M2
GLUCOSE BLD-MCNC: 104 MG/DL (ref 70–99)
HCT VFR BLD AUTO: 20.2 % (ref 35–47)
HGB BLD-MCNC: 6.7 G/DL (ref 11.7–15.7)
INR PPP: 1.24 (ref 0.85–1.15)
LYMPHOCYTES # BLD MANUAL: 1.1 10E3/UL (ref 0.8–5.3)
LYMPHOCYTES NFR BLD MANUAL: 14 %
MCH RBC QN AUTO: 29.8 PG (ref 26.5–33)
MCHC RBC AUTO-ENTMCNC: 33.2 G/DL (ref 31.5–36.5)
MCV RBC AUTO: 90 FL (ref 78–100)
MONOCYTES # BLD MANUAL: 0.7 10E3/UL (ref 0–1.3)
MONOCYTES NFR BLD MANUAL: 9 %
MYELOCYTES # BLD MANUAL: 0.1 10E3/UL
MYELOCYTES NFR BLD MANUAL: 1 %
NEUTROPHILS # BLD MANUAL: 6.1 10E3/UL (ref 1.6–8.3)
NEUTROPHILS NFR BLD MANUAL: 74 %
NRBC # BLD AUTO: 14.1 10E3/UL
NRBC BLD MANUAL-RTO: 172 %
PLAT MORPH BLD: ABNORMAL
PLATELET # BLD AUTO: 391 10E3/UL (ref 150–450)
POLYCHROMASIA BLD QL SMEAR: SLIGHT
POTASSIUM BLD-SCNC: 3.8 MMOL/L (ref 3.4–5.3)
PROT SERPL-MCNC: 7.8 G/DL (ref 6.8–8.8)
RBC # BLD AUTO: 2.25 10E6/UL (ref 3.8–5.2)
RBC MORPH BLD: ABNORMAL
SICKLE CELLS BLD QL SMEAR: ABNORMAL
SODIUM SERPL-SCNC: 143 MMOL/L (ref 133–144)
TARGETS BLD QL SMEAR: ABNORMAL
WBC # BLD AUTO: 8.2 10E3/UL (ref 4–11)

## 2022-01-17 PROCEDURE — 99215 OFFICE O/P EST HI 40 MIN: CPT | Mod: GC | Performed by: PEDIATRICS

## 2022-01-17 PROCEDURE — 36591 DRAW BLOOD OFF VENOUS DEVICE: CPT | Performed by: PEDIATRICS

## 2022-01-17 PROCEDURE — G0463 HOSPITAL OUTPT CLINIC VISIT: HCPCS

## 2022-01-17 PROCEDURE — 250N000011 HC RX IP 250 OP 636: Performed by: PEDIATRICS

## 2022-01-17 PROCEDURE — 82435 ASSAY OF BLOOD CHLORIDE: CPT | Performed by: PEDIATRICS

## 2022-01-17 PROCEDURE — 85610 PROTHROMBIN TIME: CPT | Performed by: PEDIATRICS

## 2022-01-17 PROCEDURE — 85027 COMPLETE CBC AUTOMATED: CPT | Performed by: PEDIATRICS

## 2022-01-17 RX ORDER — HEPARIN SODIUM (PORCINE) LOCK FLUSH IV SOLN 100 UNIT/ML 100 UNIT/ML
5 SOLUTION INTRAVENOUS DAILY PRN
Status: DISCONTINUED | OUTPATIENT
Start: 2022-01-17 | End: 2022-01-22 | Stop reason: HOSPADM

## 2022-01-17 RX ORDER — ALBUTEROL SULFATE 90 UG/1
2 AEROSOL, METERED RESPIRATORY (INHALATION) EVERY 6 HOURS PRN
Qty: 8.5 G | Refills: 3 | Status: SHIPPED | OUTPATIENT
Start: 2022-01-17 | End: 2022-05-17

## 2022-01-17 RX ORDER — OXYCODONE HYDROCHLORIDE 15 MG/1
15 TABLET ORAL EVERY 4 HOURS PRN
Qty: 45 TABLET | Refills: 0 | Status: SHIPPED | OUTPATIENT
Start: 2022-01-17 | End: 2022-01-24

## 2022-01-17 RX ADMIN — Medication 5 ML: at 11:13

## 2022-01-17 ASSESSMENT — PAIN SCALES - GENERAL: PAINLEVEL: WORST PAIN (10)

## 2022-01-17 NOTE — LETTER
"    1/17/2022         RE: Jennifer Cervantes  [address]  [facility] [place] [address]        Dear Colleague,    Thank you for referring your patient, Jennifer Cervantes, to the [facility]. Please see a copy of my visit note below.    Oncology Rooming Note    January 17, 2022 11:32 AM   Jennifer Cervantes is a 21 year old female who presents for:    Chief Complaint   Patient presents with     Port Draw     Labs drawn via port by RN in lab. VS taken.      Oncology Clinic Visit     sickle cell disease     Initial Vitals: /83   Pulse 110   Temp 97.7  F (36.5  C) (Tympanic)   Resp 14   Wt 75.2 kg (165 lb 12.8 oz)   SpO2 96%   BMI 28.46 kg/m   Estimated body mass index is 28.46 kg/m  as calculated from the following:    Height as of 1/8/22: 1.626 m (5' 4\").    Weight as of this encounter: 75.2 kg (165 lb 12.8 oz). Body surface area is 1.84 meters squared.  Worst Pain (10) Comment: Data Unavailable   No LMP recorded. Patient has had an injection.  Allergies reviewed: Yes  Medications reviewed: Yes    Medications: Medication refills not needed today.  Pharmacy name entered into [employer]:    [facility] - [place], [place] - [address] [place] [address] PHARMACY [facility] - [place], [place] - [address] [facility] [phone]    Clinical concerns: pain Perla was notified.      Syl Kramer RN     Adult Sickle Cell Outpatient Visit  Jan 17, 2022    Reason for Visit: Follow up of sickle cell disease    The visit today was done with David Bourne MD (fellow) and Jennifer's mother was also on the phone throughout.     She is following up for pain management and anticoagulation with a primary focus for today being pain management strategy and adherence.    Interval History:  Jennifer is back today for follow-up.  Our last visit was about a month ago.  In that time, her acute healthcare utilization been extreme.  She has been to the infusion clinic nearly every day that is open with additional ED " visits sprinkled throughout the last month, including on some of the same days as the infusion visit.  It has been a challenge to go a day without having her call him.  We have allowed those infusions to date but they have continued unabated.  She had labs last week during her visit which also raised concerns for nonadherence to hydroxyurea.  Her peripheral smear was done which look like an untreated sample of sickle cell blood.  Her fetal hemoglobin percentage was only 4% as well.  She also came into the visit very frustrated because she was concerned that I was late, that we actually started the visit 10 minutes early.  She is also frustrated because her overall prescriptions at home have been reduced 10% due to not adhering with other parts of the medical planning.    With that context in mind, the visit started off a bit tense.  Her mom is on the phone and we discussed the various components of my concerns regarding her overall care at this point.  We talked about the excessive use of the infusion clinic.  I raised my concerns about adherence to hydroxyurea as well.  I also have received information from home care and pharmacy that she has been refusing INR checks as well as the Desferal infusions.  I then offered the opportunity to hear their side of the story, since I know its been primarily just the medical side.  On the infusion clinic side, she just reports that winter pain has been significant.  When I asked if there are other stressors, since I know that stress is a big trigger for her, she declined any other major changes.  She said that she had a good holidays and that was not linked to this.  She did ask about oxycodone prescription which I stood by my past decision to reduce the overall dosing.  I shared that my concern is that her pain is clearly nonresponsive to opiates as evidenced by the frequency of use, the reality that her refill requests are, by mathematical calculations, too frequent to  actually be using at the prescribed frequency.  I did also share that if she declines visits for other reasons, then infusion visits will be cut off.  I think it is very important for patients to understand that we are a hematology Comprehensive Care clinic, not a pain clinic.  She endorsed that understanding    From the laboratory and Desferal side, she said that she does answer all of her calls and is not sure why there is lack of communication or why I am hearing that she is not doing those labs.  She did say that she is taking her warfarin.  She said that she is not sure why there is been a lack of communication but they have not been showing up.  She and mom have also been frustrated that the port needle is often not removed by the nurse at the end of the weekend.  This was news to me so I asked for more clarification.  She says that when they are there, it does get given but it assumed, at least by she and mom, they supposed to take care of it at the end which was not the agreement.  I said I would follow-up.  It sounds like may be the last time they got the Desferal was a month ago though I need to confirm that.  It does not sound like she has gotten it in January    For the warfarin, I was unable to figure out whether dietary changes have meant that her INR has been chronically low but they have been essentially since we made the transition.  I did ask the pharmacy to make a standard across-the-board daily dose rather than alternating doses given the complexity of her polypharmacy.  I also am reducing her medication list by stopping her sertraline and Abilify (sertraline weaning down over a week) as she is not finding any benefit and that is just more medications to take.  We also reduced some of her other nonregular medications.    1 further topics she wanted follow-up on was her port.  As we have discussed previously, she did bring up that while her port is not tilted, she has a lot of keloid over it and  she is concerned about that.  She wants to see If she can get a different port placement so I put in an IR referral again.      Ultimately, the visit was long but hopefully fruitful.  I did stress that we are trying to do these things including restricting pain options, and her best interest.  I did agree to modify her ED plan to use utilize ketamine as an opioid-sparing agent.  The ED has used this for her successfully a couple times in the last few months.  We will not be doing that on the infusion side though.  By the end of the visit, I think we made some progress though I still sensed she is skeptical about the overall approach    Current Outpatient Medications   Medication Sig Dispense Refill     albuterol (PROAIR HFA/PROVENTIL HFA/VENTOLIN HFA) 108 (90 Base) MCG/ACT inhaler Inhale 2 puffs into the lungs every 6 hours as needed for shortness of breath / dyspnea or wheezing 8.5 g 3     Hydroxyurea 1000 MG TABS Take 3,000 mg by mouth daily 90 tablet 3     oxyCODONE IR (ROXICODONE) 15 MG tablet Take 1 tablet (15 mg) by mouth every 4 hours as needed for severe pain Goal 4 per day. Max 6 per day. 45 tablet 0     acetaminophen (TYLENOL) 325 MG tablet Take 2 tablets (650 mg) by mouth every 6 hours as needed for mild pain 120 tablet 3     albuterol (PROVENTIL) (2.5 MG/3ML) 0.083% neb solution Take 1 vial (2.5 mg) by nebulization every 6 hours as needed for shortness of breath / dyspnea or wheezing 12 mL 4     budesonide-formoterol (SYMBICORT) 160-4.5 MCG/ACT Inhaler Inhale 2 puffs into the lungs 2 times daily 10.2 g 3     deferasirox (JADENU) 360 MG tablet Take 4 tablets (1,440 mg) by mouth every evening 120 tablet 4     diphenhydrAMINE (BENADRYL) 25 MG capsule Take 1-2 capsules (25-50 mg) by mouth nightly as needed for sleep 60 capsule 3     EPINEPHrine (ANY BX GENERIC EQUIV) 0.3 MG/0.3ML injection 2-pack Inject 0.3 mLs (0.3 mg) into the muscle as needed for anaphylaxis 1 each 1     lidocaine-prilocaine (EMLA)  2.5-2.5 % external cream Apply topically once for 1 dose Use for port site as needed (Patient not taking: Reported on 11/10/2021) 30 g 1     medroxyPROGESTERone (DEPO-PROVERA) 150 MG/ML IM injection Inject 150 mg into the muscle       morphine (MS CONTIN) 15 MG CR tablet Take 1 tablet (15 mg) by mouth every 12 hours 60 tablet 0     naloxone (NARCAN) 4 MG/0.1ML nasal spray Spray 1 spray (4 mg) into one nostril alternating nostrils once as needed for opioid reversal every 2-3 minutes until assistance arrives 0.2 mL 1     ondansetron (ZOFRAN) 8 MG tablet Take 1 tablet (8 mg) by mouth every 8 hours as needed 30 tablet 1     warfarin ANTICOAGULANT (COUMADIN) 5 MG tablet Take 2 to 3 tablets by mouth daily or as directed by the Coumadin clinic. 60 tablet 1     warfarin ANTICOAGULANT (COUMADIN) 5 MG tablet Take 1 tablet (5 mg) by mouth four times a week Take 5 mg on Tues, Thurs, Sat, Sun. INR check on Tues 11/23 so dose can change. 16 tablet 0     warfarin ANTICOAGULANT (COUMADIN) 7.5 MG tablet Take 1 tablet (7.5 mg) by mouth three times a week Take 7.5 mg on Mon, Wed, Fri. INR check on Tues 11/23 so dose can change. 12 tablet 0       Past Medical History  Past Medical History:   Diagnosis Date     Anxiety      Bleeding disorder (H)      Blood clotting disorder (H)      Cerebral infarction (H) 2015     Cognitive developmental delay     low IQ. 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 (H)     right hand contractures     Iron overload due to repeated red blood cell transfusions      Migraines      Multiple subsegmental pulmonary emboli without acute cor pulmonale (H) 02/01/2021     Oppositional defiant behavior      Superficial venous thrombosis of arm, right 03/25/2021     Uncomplicated asthma      Past Surgical History:   Procedure Laterality Date     AS INSERT TUNNELED CV 2 CATH W/O PORT/PUMP        CHOLECYSTECTOMY       CV RIGHT HEART CATH MEASUREMENTS RECORDED N/A 11/18/2021    Procedure: Right Heart Cath;  Surgeon: Jackson Stauffer MD;  Location:  HEART CARDIAC CATH LAB     INSERT PORT VASCULAR ACCESS Left 4/21/2021    Procedure: INSERTION, VASCULAR ACCESS PORT (NOT SURE ON SIDE UNTIL REMOVAL);  Surgeon: Rajan More MD;  Location: [facility] OR     IR CHEST PORT PLACEMENT > 5 YRS OF AGE  4/21/2021     IR CVC NON TUNNEL LINE REMOVAL  5/6/2021     IR CVC NON TUNNEL PLACEMENT  04/07/2020     IR CVC NON TUNNEL PLACEMENT  4/30/2021     IR PORT REMOVAL LEFT  4/21/2021     REMOVE PORT VASCULAR ACCESS Left 4/21/2021    Procedure: REMOVAL, VASCULAR ACCESS PORT LEFT;  Surgeon: Rajan More MD;  Location: [facility] OR     REPAIR TENDON ELBOW Right 10/02/2019    Procedure: Right Elbow Flexor Lengthening, Flexor Pronator Slide Of Wrist and Finger, Thumb Adductor Lengthening;  Surgeon: Anai Franco MD;  Location: [facility] OR     TONSILLECTOMY Bilateral 10/02/2019    Procedure: Bilateral Tonsillectomy;  Surgeon: Farhana Guy MD;  Location: [facility] OR     ZZC BREAST REDUCTION (INCLUDES LIPO) TIER 3 Bilateral 04/23/2019     Allergies   Allergen Reactions     Contrast Dye      Hives and breathing issues     Fish-Derived Products Hives     Seafood Hives     Diagnostic X-Ray Materials      Gadolinium      Social History   Social History     Tobacco Use     Smoking status: Never Smoker     Smokeless tobacco: Never Used   Substance Use Topics     Alcohol use: Not Currently     Alcohol/week: 0.0 standard drinks     Drug use: Never      Past medical history and social history were reviewed.    Physical Examination:  /83   Pulse 110   Temp 97.7  F (36.5  C) (Tympanic)   Resp 14   Wt 75.2 kg (165 lb 12.8 oz)   SpO2 96%   BMI 28.46 kg/m    Wt Readings from Last 10 Encounters:   01/17/22 75.2 kg (165 lb 12.8 oz)   01/12/22 77.9 kg (171 lb 12.8 oz)   01/08/22 77.1 kg (170 lb)   01/02/22 77.1 kg (170 lb)    12/27/21 76.5 kg (168 lb 10.4 oz)   12/25/21 77.1 kg (170 lb)   12/20/21 77.1 kg (170 lb)   12/15/21 77.1 kg (170 lb)   12/12/21 77.1 kg (170 lb)   12/10/21 77.1 kg (170 lb)       General: frustrated initially, though we had a cooperative discussion throughout most of the visit.   Skin: No visualized rash or lesions on visualized skin. Did not evaluate port today  Eyes: EOMI, mild sclera icterus, no discharge noted  Resp: Appears to be breathing comfortably without accessory muscle usage, speaking in full sentences, no cough  MSK: Appears to have normal range of motion based on visualized movements  Neurologic: No apparent tremors, facial movements symmetric, known right sided weakness with wrist contracture and right-sided limp unchanged.  Psych: affect somewhat labile, mostly tearful, but she has good insight.    Laboratory Data:  Results for HIMANSHU AL (MRN [id number]) as of 1/22/2022 14:42   Ref. Range 1/12/2022 14:41   Hemoglobin A Latest Ref Range: 95.0 - 97.9 % 13.2 (L)   Hemoglobin A2 Latest Ref Range: 2.0 - 3.5 % 3.1   Hemoglobin C Latest Ref Range: 0.0 - 0.0 % 0.0   Hemoglobin E Latest Ref Range: 0.0 - 0.0 % 0.0   Hemoglobin F Latest Ref Range: 0.0 - 2.1 % 4.3 (H)   Hemoglobin S Latest Ref Range: 0.0 - 0.0 % 79.4 (H)   Hemoglobin Other Latest Ref Range: 0.0 - 0.0 % 0.0     Results for HIMANSHU AL (MRN [id number]) as of 1/22/2022 14:42   Ref. Range 1/17/2022 11:17   Sodium Latest Ref Range: 133 - 144 mmol/L 143   Potassium Latest Ref Range: 3.4 - 5.3 mmol/L 3.8   Chloride Latest Ref Range: 94 - 109 mmol/L 113 (H)   Carbon Dioxide Latest Ref Range: 20 - 32 mmol/L 20   Urea Nitrogen Latest Ref Range: 7 - 30 mg/dL 7   Creatinine Latest Ref Range: 0.52 - 1.04 mg/dL 0.45 (L)   GFR Estimate Latest Ref Range: >60 mL/min/1.73m2 >90   Calcium Latest Ref Range: 8.5 - 10.1 mg/dL 8.8   Anion Gap Latest Ref Range: 3 - 14 mmol/L 10   Albumin Latest Ref Range: 3.4 - 5.0 g/dL 3.9   Protein Total Latest Ref Range:  6.8 - 8.8 g/dL 7.8   Bilirubin Total Latest Ref Range: 0.2 - 1.3 mg/dL 2.2 (H)   Alkaline Phosphatase Latest Ref Range: 40 - 150 U/L 61   ALT Latest Ref Range: 0 - 50 U/L 44   AST Latest Ref Range: 0 - 45 U/L 46 (H)   Glucose Latest Ref Range: 70 - 99 mg/dL 104 (H)   WBC Latest Ref Range: 4.0 - 11.0 10e3/uL 8.2   Hemoglobin Latest Ref Range: 11.7 - 15.7 g/dL 6.7 (LL)   Hematocrit Latest Ref Range: 35.0 - 47.0 % 20.2 (L)   Platelet Count Latest Ref Range: 150 - 450 10e3/uL 391   RBC Count Latest Ref Range: 3.80 - 5.20 10e6/uL 2.25 (L)   MCV Latest Ref Range: 78 - 100 fL 90   MCH Latest Ref Range: 26.5 - 33.0 pg 29.8   MCHC Latest Ref Range: 31.5 - 36.5 g/dL 33.2   RDW Latest Ref Range: 10.0 - 15.0 % 21.2 (H)   % Neutrophils Latest Units: % 74   % Lymphocytes Latest Units: % 14   % Monocytes Latest Units: % 9   % Eosinophils Latest Units: % 2   % Basophils Latest Units: % 0   % Myelocytes Latest Units: % 1   Absolute Basophils Latest Ref Range: 0.0 - 0.2 10e3/uL 0.0   NRBC/W Latest Ref Range: <=0 % 172 (H)   Absolute Neutrophil Latest Ref Range: 1.6 - 8.3 10e3/uL 6.1   Absolute Lymphocytes Latest Ref Range: 0.8 - 5.3 10e3/uL 1.1   Absolute Monocytes Latest Ref Range: 0.0 - 1.3 10e3/uL 0.7   Absolute Eosinophils Latest Ref Range: 0.0 - 0.7 10e3/uL 0.2   Absolute Myelocytes Latest Ref Range: <=0.0 10e3/uL 0.1 (H)   Absolute NRBCs Latest Ref Range: <=0.0 10e3/uL 14.1 (H)   RBC Morphology Unknown Confirmed RBC Indices   Platelet Morphology Latest Ref Range: Automated Count Confirmed. Platelet morphology is normal.  Automated Count Confirmed. Platelet morphology is normal.   Polychromasia Latest Ref Range: None Seen  Slight (A)   Sickle Cells Latest Ref Range: None Seen  Marked (A)   Target Cells Latest Ref Range: None Seen  Moderate (A)   INR Latest Ref Range: 0.85 - 1.15  1.24 (H)         Assessment and Plan:  1. Sickle Cell Disease  HgbSS complicated by hx stroke, acute chest, iron overload, chronic pain, acute on  chronic pulmonary embolism with multiple hospitalizations. She has been in ED or infusion many times in the last several weeks for pain crisis which she attributes to weather or other causes. Her acute care utilization is excessive and was the core topic of today's visit     Labs: Reviewed        Meds: Increase Hydrea to 3000mg daily (refilled).  She reports that she is taking this.  However, her hemoglobin F percentage is only 4% which is inconsistent with regular use.  Her peripheral smear from last week is also inconsistent with being on hydroxyurea. She and mom report some nonresponsiveness during her pediatric years.  I am not sure what to make of that, since #1 she was on chronic transfusions and hydroxyurea has not necessarily used in conjunction with that, but also #2, hydroxyurea would necessarily be less effective if the S percentages reduced by means of transfusion.  She has not had a transfusion in several months so that is not an consistent answer with her lab findings today.  However, if she is by some means less responsive, that is why we are increasing the dose today.     Pain Control: Continue MSContin 15mg BID. Continue Oxycodone to 15mg PRN max 6 per day-#50 tabs per week. DO NOT INCREASE. Continue Cymbalta 30mg BID. Continue Tylenol PRN.      Continue plan to avoid IV narcotics as able in ED to discourage frequent ED usage. Will okay ketamine in ED and modify the pain plan. Still recommend avoiding IV narcotics. We will also be reducing the infusion clinic use to a max 2x/week.  We spent a lot of this visit discussing appropriate pain management, adherence to her overall plan, trying to get clarity on her medication use, and most importantly, the far excessive use of acute care visits at this point.  We have tried lots of different options without success.  This is clearly gotten to the point that this is a chronic pain issue where it is opioid nonresponsive.  The fact that she calls an  essentially daily is a sign that this is not a good plan; it is overall both a poor use of resources for our clinic but also represents that he strategy that worked in 2020 is no longer working in 2021 (she had 12 acute visits in 2020 and 95 in 2021 and she has had infusion visits nearly every day the month during January during business days).     Follow-up: Continue ANDREAS or MD monthly    Iron overload: continue Desferal and Jadenu at current dosing. Ferriscan ordered for January 25. There have been missed weeks for the Desferal. We will see how she has fared in the last few months with intermittent Desferal use since she had great results with the more regular use.     2. Neuro  History of stroke. Off ASA now that she is on warfarin.      Dr. Pierce doing botox for spasticity and symptoms improving.      Migraines, chronic issue, saw neurology. Has follow-up with specialist scheduled in February     3. Psych  History of anxiety and depression. Feels worse. She does not feel the sertraline works so we will wean back off. Discontinuing sertraline and Abilify today to reduce polypharmacy    Follows with outside therapist.     OK to use Benadryl PRN insomnia.      4. Pulm  History of asthma, continue Symbicort and Albuterol PRN. Pulm visit scheduled. Has home O2. Will see about getting her a home pulse oximeter. O2 92% on RA today.      5. Cardiovscular  Acute on chronic PE with DOAC failure, now on warfarin. Anticoag clinic managing. I did push for a higher dose at the same dose daily, since she has so many medications and alternating therapy increases the risk for mistakes or missed doses.  I am still at a loss to explain why her INR levels remain a level where it is clear that she is likely taking some, but we are having a lot of warfarin resistance otherwise.  I discussed dietary topics in the past.  I am challenged to find out more details about the amount of vitamin K in her diet but that is possibly a contributor.   Her INR tends to be in the 1.2-1.3 range, so unless the iron is causing actual hepatic dysfunction at this point, I do think that she is taking the warfarin. We talked today about her responsiveness to INR testing, as I have been notified by the pharmacists and their lab team that she has frequently declined INR testing. She said she has not heard from them, though she answers all of the calls. I am not sure where the truth lies in between these two but I pledged to help with communication and that in the meantime, she should allow the INRs to be checked.    Will work with IR about way to evaluate port with contrast allergy.  She is worried about her keloiding whether she can get a new port placed somewhere else. IR order placed    She has also made statements about wanting to find other providers.  She did not state that with me today but she has made that to other members of the care team.  If that is the case, we want to make sure we do what is best for her.  If that means getting her to another center, we will help make that referral but it must be to a center that can provide comprehensive care for tumor.  It cannot be to just any center in our regional area because most are ineffective at caring for sickle cell disease in adults, particularly with her complexity.    65 minutes spent on the date of the encounter doing chart review, review of test results, interpretation of tests, patient visit and documentation     Next visit: 1/21/22 with Andrei Machado          Again, thank you for allowing me to participate in the care of your patient.      Sincerely,    Eric Duncan MD

## 2022-01-17 NOTE — NURSING NOTE
Chief Complaint   Patient presents with     Port Draw     Labs drawn via port by RN in lab. VS taken.      Labs drawn via Port accessed using 20g flat needle. Line flushed and Heparin locked. Vital signs taken. Checked into next appointment.     Jennifer Lai RN

## 2022-01-17 NOTE — PROGRESS NOTES
"Received call from triage that pt informed them she is not staying for appointment with Dr. Duncan, scheduled at 12 pm. Stated she came in for labs but was in too much pain to \"stick around\" and wanted to go home instead.     Writer could see on [facility] pt's location as [facility], called lab and was told pt had just left. RN did state she checked pt in for next appointment and pt was on the phone with triage but she did not tell RN she was leaving.    Called pt's phone and reached , informed her Dr. Duncan is expecting to see her in clinic and highly recommend she stay for apt if she had not left the building yet. If she needed ivf pain meds today, can get her on the cancellation list, but she needs to attend visit in order to be on list. Asked for return call.  "

## 2022-01-17 NOTE — LETTER
"    1/17/2022         RE: Jennifer Cervantes  [address]  [facility] [place] [address]      Oncology Rooming Note    January 17, 2022 11:32 AM   Jennifer Cervantes is a 21 year old female who presents for:    Chief Complaint   Patient presents with     Port Draw     Labs drawn via port by RN in lab. VS taken.      Oncology Clinic Visit     sickle cell disease     Initial Vitals: /83   Pulse 110   Temp 97.7  F (36.5  C) (Tympanic)   Resp 14   Wt 75.2 kg (165 lb 12.8 oz)   SpO2 96%   BMI 28.46 kg/m   Estimated body mass index is 28.46 kg/m  as calculated from the following:    Height as of 1/8/22: 1.626 m (5' 4\").    Weight as of this encounter: 75.2 kg (165 lb 12.8 oz). Body surface area is 1.84 meters squared.  Worst Pain (10) Comment: Data Unavailable   No LMP recorded. Patient has had an injection.  Allergies reviewed: Yes  Medications reviewed: Yes    Medications: Medication refills not needed today.  Pharmacy name entered into [employer]:    [facility] - [place], [place] - [address] [place] [address] PHARMACY [place], [place] - [address] [place] [address] [phone]    Clinical concerns: pain Perla was notified.      Syl Kramer RN     Adult Sickle Cell Outpatient Visit  Jan 17, 2022    Reason for Visit: Follow up of sickle cell disease    The visit today was done with David Bourne MD (fellow) and Jennifer's mother was also on the phone throughout.     She is following up for pain management and anticoagulation with a primary focus for today being pain management strategy and adherence.    Interval History:  Jennifer is back today for follow-up.  Our last visit was about a month ago.  In that time, her acute healthcare utilization been extreme.  She has been to the infusion clinic nearly every day that is open with additional ED visits sprinkled throughout the last month, including on some of the same days as the infusion visit.  It has been a challenge to go a day without having her call him. "  We have allowed those infusions to date but they have continued unabated.  She had labs last week during her visit which also raised concerns for nonadherence to hydroxyurea.  Her peripheral smear was done which look like an untreated sample of sickle cell blood.  Her fetal hemoglobin percentage was only 4% as well.  She also came into the visit very frustrated because she was concerned that I was late, that we actually started the visit 10 minutes early.  She is also frustrated because her overall prescriptions at home have been reduced 10% due to not adhering with other parts of the medical planning.    With that context in mind, the visit started off a bit tense.  Her mom is on the phone and we discussed the various components of my concerns regarding her overall care at this point.  We talked about the excessive use of the infusion clinic.  I raised my concerns about adherence to hydroxyurea as well.  I also have received information from home care and pharmacy that she has been refusing INR checks as well as the Desferal infusions.  I then offered the opportunity to hear their side of the story, since I know its been primarily just the medical side.  On the infusion clinic side, she just reports that winter pain has been significant.  When I asked if there are other stressors, since I know that stress is a big trigger for her, she declined any other major changes.  She said that she had a good holidays and that was not linked to this.  She did ask about oxycodone prescription which I stood by my past decision to reduce the overall dosing.  I shared that my concern is that her pain is clearly nonresponsive to opiates as evidenced by the frequency of use, the reality that her refill requests are, by mathematical calculations, too frequent to actually be using at the prescribed frequency.  I did also share that if she declines visits for other reasons, then infusion visits will be cut off.  I think it is very  important for patients to understand that we are a hematology Comprehensive Care clinic, not a pain clinic.  She endorsed that understanding    From the laboratory and Desferal side, she said that she does answer all of her calls and is not sure why there is lack of communication or why I am hearing that she is not doing those labs.  She did say that she is taking her warfarin.  She said that she is not sure why there is been a lack of communication but they have not been showing up.  She and mom have also been frustrated that the port needle is often not removed by the nurse at the end of the weekend.  This was news to me so I asked for more clarification.  She says that when they are there, it does get given but it assumed, at least by she and mom, they supposed to take care of it at the end which was not the agreement.  I said I would follow-up.  It sounds like may be the last time they got the Desferal was a month ago though I need to confirm that.  It does not sound like she has gotten it in January    For the warfarin, I was unable to figure out whether dietary changes have meant that her INR has been chronically low but they have been essentially since we made the transition.  I did ask the pharmacy to make a standard across-the-board daily dose rather than alternating doses given the complexity of her polypharmacy.  I also am reducing her medication list by stopping her sertraline and Abilify (sertraline weaning down over a week) as she is not finding any benefit and that is just more medications to take.  We also reduced some of her other nonregular medications.    1 further topics she wanted follow-up on was her port.  As we have discussed previously, she did bring up that while her port is not tilted, she has a lot of keloid over it and she is concerned about that.  She wants to see If she can get a different port placement so I put in an IR referral again.      Ultimately, the visit was long but  hopefully fruitful.  I did stress that we are trying to do these things including restricting pain options, and her best interest.  I did agree to modify her ED plan to use utilize ketamine as an opioid-sparing agent.  The ED has used this for her successfully a couple times in the last few months.  We will not be doing that on the infusion side though.  By the end of the visit, I think we made some progress though I still sensed she is skeptical about the overall approach    Current Outpatient Medications   Medication Sig Dispense Refill     albuterol (PROAIR HFA/PROVENTIL HFA/VENTOLIN HFA) 108 (90 Base) MCG/ACT inhaler Inhale 2 puffs into the lungs every 6 hours as needed for shortness of breath / dyspnea or wheezing 8.5 g 3     Hydroxyurea 1000 MG TABS Take 3,000 mg by mouth daily 90 tablet 3     oxyCODONE IR (ROXICODONE) 15 MG tablet Take 1 tablet (15 mg) by mouth every 4 hours as needed for severe pain Goal 4 per day. Max 6 per day. 45 tablet 0     acetaminophen (TYLENOL) 325 MG tablet Take 2 tablets (650 mg) by mouth every 6 hours as needed for mild pain 120 tablet 3     albuterol (PROVENTIL) (2.5 MG/3ML) 0.083% neb solution Take 1 vial (2.5 mg) by nebulization every 6 hours as needed for shortness of breath / dyspnea or wheezing 12 mL 4     budesonide-formoterol (SYMBICORT) 160-4.5 MCG/ACT Inhaler Inhale 2 puffs into the lungs 2 times daily 10.2 g 3     deferasirox (JADENU) 360 MG tablet Take 4 tablets (1,440 mg) by mouth every evening 120 tablet 4     diphenhydrAMINE (BENADRYL) 25 MG capsule Take 1-2 capsules (25-50 mg) by mouth nightly as needed for sleep 60 capsule 3     EPINEPHrine (ANY BX GENERIC EQUIV) 0.3 MG/0.3ML injection 2-pack Inject 0.3 mLs (0.3 mg) into the muscle as needed for anaphylaxis 1 each 1     lidocaine-prilocaine (EMLA) 2.5-2.5 % external cream Apply topically once for 1 dose Use for port site as needed (Patient not taking: Reported on 11/10/2021) 30 g 1     medroxyPROGESTERone  (DEPO-PROVERA) 150 MG/ML IM injection Inject 150 mg into the muscle       morphine (MS CONTIN) 15 MG CR tablet Take 1 tablet (15 mg) by mouth every 12 hours 60 tablet 0     naloxone (NARCAN) 4 MG/0.1ML nasal spray Spray 1 spray (4 mg) into one nostril alternating nostrils once as needed for opioid reversal every 2-3 minutes until assistance arrives 0.2 mL 1     ondansetron (ZOFRAN) 8 MG tablet Take 1 tablet (8 mg) by mouth every 8 hours as needed 30 tablet 1     warfarin ANTICOAGULANT (COUMADIN) 5 MG tablet Take 2 to 3 tablets by mouth daily or as directed by the Coumadin clinic. 60 tablet 1     warfarin ANTICOAGULANT (COUMADIN) 5 MG tablet Take 1 tablet (5 mg) by mouth four times a week Take 5 mg on Tues, Thurs, Sat, Sun. INR check on Tues 11/23 so dose can change. 16 tablet 0     warfarin ANTICOAGULANT (COUMADIN) 7.5 MG tablet Take 1 tablet (7.5 mg) by mouth three times a week Take 7.5 mg on Mon, Wed, Fri. INR check on Tues 11/23 so dose can change. 12 tablet 0       Past Medical History  Past Medical History:   Diagnosis Date     Anxiety      Bleeding disorder (H)      Blood clotting disorder (H)      Cerebral infarction (H) 2015     Cognitive developmental delay     low IQ. 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 (H)     right hand contractures     Iron overload due to repeated red blood cell transfusions      Migraines      Multiple subsegmental pulmonary emboli without acute cor pulmonale (H) 02/01/2021     Oppositional defiant behavior      Superficial venous thrombosis of arm, right 03/25/2021     Uncomplicated asthma      Past Surgical History:   Procedure Laterality Date     AS INSERT TUNNELED CV 2 CATH W/O PORT/PUMP       CHOLECYSTECTOMY       CV RIGHT HEART CATH MEASUREMENTS RECORDED N/A 11/18/2021    Procedure: Right Heart Cath;  Surgeon: Jackson Stauffer MD;   Location:  HEART CARDIAC CATH LAB     INSERT PORT VASCULAR ACCESS Left 4/21/2021    Procedure: INSERTION, VASCULAR ACCESS PORT (NOT SURE ON SIDE UNTIL REMOVAL);  Surgeon: Rajan More MD;  Location: [facility] OR     IR CHEST PORT PLACEMENT > 5 YRS OF AGE  4/21/2021     IR CVC NON TUNNEL LINE REMOVAL  5/6/2021     IR CVC NON TUNNEL PLACEMENT  04/07/2020     IR CVC NON TUNNEL PLACEMENT  4/30/2021     IR PORT REMOVAL LEFT  4/21/2021     REMOVE PORT VASCULAR ACCESS Left 4/21/2021    Procedure: REMOVAL, VASCULAR ACCESS PORT LEFT;  Surgeon: Rajan More MD;  Location: [facility] OR     REPAIR TENDON ELBOW Right 10/02/2019    Procedure: Right Elbow Flexor Lengthening, Flexor Pronator Slide Of Wrist and Finger, Thumb Adductor Lengthening;  Surgeon: Anai Franco MD;  Location: [facility] OR     TONSILLECTOMY Bilateral 10/02/2019    Procedure: Bilateral Tonsillectomy;  Surgeon: Farhana Guy MD;  Location: [facility] OR     ZZC BREAST REDUCTION (INCLUDES LIPO) TIER 3 Bilateral 04/23/2019     Allergies   Allergen Reactions     Contrast Dye      Hives and breathing issues     Fish-Derived Products Hives     Seafood Hives     Diagnostic X-Ray Materials      Gadolinium      Social History   Social History     Tobacco Use     Smoking status: Never Smoker     Smokeless tobacco: Never Used   Substance Use Topics     Alcohol use: Not Currently     Alcohol/week: 0.0 standard drinks     Drug use: Never      Past medical history and social history were reviewed.    Physical Examination:  /83   Pulse 110   Temp 97.7  F (36.5  C) (Tympanic)   Resp 14   Wt 75.2 kg (165 lb 12.8 oz)   SpO2 96%   BMI 28.46 kg/m    Wt Readings from Last 10 Encounters:   01/17/22 75.2 kg (165 lb 12.8 oz)   01/12/22 77.9 kg (171 lb 12.8 oz)   01/08/22 77.1 kg (170 lb)   01/02/22 77.1 kg (170 lb)   12/27/21 76.5 kg (168 lb 10.4 oz)   12/25/21 77.1 kg (170 lb)   12/20/21 77.1 kg (170 lb)   12/15/21 77.1 kg (170 lb)   12/12/21 77.1 kg (170 lb)    12/10/21 77.1 kg (170 lb)       General: frustrated initially, though we had a cooperative discussion throughout most of the visit.   Skin: No visualized rash or lesions on visualized skin. Did not evaluate port today  Eyes: EOMI, mild sclera icterus, no discharge noted  Resp: Appears to be breathing comfortably without accessory muscle usage, speaking in full sentences, no cough  MSK: Appears to have normal range of motion based on visualized movements  Neurologic: No apparent tremors, facial movements symmetric, known right sided weakness with wrist contracture and right-sided limp unchanged.  Psych: affect somewhat labile, mostly tearful, but she has good insight.    Laboratory Data:  Results for HIMANSHU AL (MRN [id number]) as of 1/22/2022 14:42   Ref. Range 1/12/2022 14:41   Hemoglobin A Latest Ref Range: 95.0 - 97.9 % 13.2 (L)   Hemoglobin A2 Latest Ref Range: 2.0 - 3.5 % 3.1   Hemoglobin C Latest Ref Range: 0.0 - 0.0 % 0.0   Hemoglobin E Latest Ref Range: 0.0 - 0.0 % 0.0   Hemoglobin F Latest Ref Range: 0.0 - 2.1 % 4.3 (H)   Hemoglobin S Latest Ref Range: 0.0 - 0.0 % 79.4 (H)   Hemoglobin Other Latest Ref Range: 0.0 - 0.0 % 0.0     Results for HIMANSHU AL (MRN [id number]) as of 1/22/2022 14:42   Ref. Range 1/17/2022 11:17   Sodium Latest Ref Range: 133 - 144 mmol/L 143   Potassium Latest Ref Range: 3.4 - 5.3 mmol/L 3.8   Chloride Latest Ref Range: 94 - 109 mmol/L 113 (H)   Carbon Dioxide Latest Ref Range: 20 - 32 mmol/L 20   Urea Nitrogen Latest Ref Range: 7 - 30 mg/dL 7   Creatinine Latest Ref Range: 0.52 - 1.04 mg/dL 0.45 (L)   GFR Estimate Latest Ref Range: >60 mL/min/1.73m2 >90   Calcium Latest Ref Range: 8.5 - 10.1 mg/dL 8.8   Anion Gap Latest Ref Range: 3 - 14 mmol/L 10   Albumin Latest Ref Range: 3.4 - 5.0 g/dL 3.9   Protein Total Latest Ref Range: 6.8 - 8.8 g/dL 7.8   Bilirubin Total Latest Ref Range: 0.2 - 1.3 mg/dL 2.2 (H)   Alkaline Phosphatase Latest Ref Range: 40 - 150 U/L 61   ALT Latest  Ref Range: 0 - 50 U/L 44   AST Latest Ref Range: 0 - 45 U/L 46 (H)   Glucose Latest Ref Range: 70 - 99 mg/dL 104 (H)   WBC Latest Ref Range: 4.0 - 11.0 10e3/uL 8.2   Hemoglobin Latest Ref Range: 11.7 - 15.7 g/dL 6.7 (LL)   Hematocrit Latest Ref Range: 35.0 - 47.0 % 20.2 (L)   Platelet Count Latest Ref Range: 150 - 450 10e3/uL 391   RBC Count Latest Ref Range: 3.80 - 5.20 10e6/uL 2.25 (L)   MCV Latest Ref Range: 78 - 100 fL 90   MCH Latest Ref Range: 26.5 - 33.0 pg 29.8   MCHC Latest Ref Range: 31.5 - 36.5 g/dL 33.2   RDW Latest Ref Range: 10.0 - 15.0 % 21.2 (H)   % Neutrophils Latest Units: % 74   % Lymphocytes Latest Units: % 14   % Monocytes Latest Units: % 9   % Eosinophils Latest Units: % 2   % Basophils Latest Units: % 0   % Myelocytes Latest Units: % 1   Absolute Basophils Latest Ref Range: 0.0 - 0.2 10e3/uL 0.0   NRBC/W Latest Ref Range: <=0 % 172 (H)   Absolute Neutrophil Latest Ref Range: 1.6 - 8.3 10e3/uL 6.1   Absolute Lymphocytes Latest Ref Range: 0.8 - 5.3 10e3/uL 1.1   Absolute Monocytes Latest Ref Range: 0.0 - 1.3 10e3/uL 0.7   Absolute Eosinophils Latest Ref Range: 0.0 - 0.7 10e3/uL 0.2   Absolute Myelocytes Latest Ref Range: <=0.0 10e3/uL 0.1 (H)   Absolute NRBCs Latest Ref Range: <=0.0 10e3/uL 14.1 (H)   RBC Morphology Unknown Confirmed RBC Indices   Platelet Morphology Latest Ref Range: Automated Count Confirmed. Platelet morphology is normal.  Automated Count Confirmed. Platelet morphology is normal.   Polychromasia Latest Ref Range: None Seen  Slight (A)   Sickle Cells Latest Ref Range: None Seen  Marked (A)   Target Cells Latest Ref Range: None Seen  Moderate (A)   INR Latest Ref Range: 0.85 - 1.15  1.24 (H)         Assessment and Plan:  1. Sickle Cell Disease  HgbSS complicated by hx stroke, acute chest, iron overload, chronic pain, acute on chronic pulmonary embolism with multiple hospitalizations. She has been in ED or infusion many times in the last several weeks for pain crisis which she  attributes to weather or other causes. Her acute care utilization is excessive and was the core topic of today's visit     Labs: Reviewed        Meds: Increase Hydrea to 3000mg daily (refilled).  She reports that she is taking this.  However, her hemoglobin F percentage is only 4% which is inconsistent with regular use.  Her peripheral smear from last week is also inconsistent with being on hydroxyurea. She and mom report some nonresponsiveness during her pediatric years.  I am not sure what to make of that, since #1 she was on chronic transfusions and hydroxyurea has not necessarily used in conjunction with that, but also #2, hydroxyurea would necessarily be less effective if the S percentages reduced by means of transfusion.  She has not had a transfusion in several months so that is not an consistent answer with her lab findings today.  However, if she is by some means less responsive, that is why we are increasing the dose today.     Pain Control: Continue MSContin 15mg BID. Continue Oxycodone to 15mg PRN max 6 per day-#50 tabs per week. DO NOT INCREASE. Continue Cymbalta 30mg BID. Continue Tylenol PRN.      Continue plan to avoid IV narcotics as able in ED to discourage frequent ED usage. Will okay ketamine in ED and modify the pain plan. Still recommend avoiding IV narcotics. We will also be reducing the infusion clinic use to a max 2x/week.  We spent a lot of this visit discussing appropriate pain management, adherence to her overall plan, trying to get clarity on her medication use, and most importantly, the far excessive use of acute care visits at this point.  We have tried lots of different options without success.  This is clearly gotten to the point that this is a chronic pain issue where it is opioid nonresponsive.  The fact that she calls an essentially daily is a sign that this is not a good plan; it is overall both a poor use of resources for our clinic but also represents that he strategy that  worked in 2020 is no longer working in 2021 (she had 12 acute visits in 2020 and 95 in 2021 and she has had infusion visits nearly every day the month during January during business days).     Follow-up: Continue ANDREAS or MD monthly    Iron overload: continue Desferal and Jadenu at current dosing. Ferriscan ordered for January 25. There have been missed weeks for the Desferal. We will see how she has fared in the last few months with intermittent Desferal use since she had great results with the more regular use.     2. Neuro  History of stroke. Off ASA now that she is on warfarin.      Dr. Pierce doing botox for spasticity and symptoms improving.      Migraines, chronic issue, saw neurology. Has follow-up with specialist scheduled in February     3. Psych  History of anxiety and depression. Feels worse. She does not feel the sertraline works so we will wean back off. Discontinuing sertraline and Abilify today to reduce polypharmacy    Follows with outside therapist.     OK to use Benadryl PRN insomnia.      4. Pulm  History of asthma, continue Symbicort and Albuterol PRN. Pulm visit scheduled. Has home O2. Will see about getting her a home pulse oximeter. O2 92% on RA today.      5. Cardiovscular  Acute on chronic PE with DOAC failure, now on warfarin. Anticoag clinic managing. I did push for a higher dose at the same dose daily, since she has so many medications and alternating therapy increases the risk for mistakes or missed doses.  I am still at a loss to explain why her INR levels remain a level where it is clear that she is likely taking some, but we are having a lot of warfarin resistance otherwise.  I discussed dietary topics in the past.  I am challenged to find out more details about the amount of vitamin K in her diet but that is possibly a contributor.  Her INR tends to be in the 1.2-1.3 range, so unless the iron is causing actual hepatic dysfunction at this point, I do think that she is taking the  warfarin. We talked today about her responsiveness to INR testing, as I have been notified by the pharmacists and their lab team that she has frequently declined INR testing. She said she has not heard from them, though she answers all of the calls. I am not sure where the truth lies in between these two but I pledged to help with communication and that in the meantime, she should allow the INRs to be checked.    Will work with IR about way to evaluate port with contrast allergy.  She is worried about her keloiding whether she can get a new port placed somewhere else. IR order placed    She has also made statements about wanting to find other providers.  She did not state that with me today but she has made that to other members of the care team.  If that is the case, we want to make sure we do what is best for her.  If that means getting her to another center, we will help make that referral but it must be to a center that can provide comprehensive care for tumor.  It cannot be to just any center in our regional area because most are ineffective at caring for sickle cell disease in adults, particularly with her complexity.    65 minutes spent on the date of the encounter doing chart review, review of test results, interpretation of tests, patient visit and documentation     Next visit: 1/21/22 with Andrei Duncan MD  Hematologist  Division of Hematology, Oncology, and Transplantation  [facility]  [facility]  Pager: [phone]

## 2022-01-17 NOTE — PATIENT INSTRUCTIONS
Stop the Abilify  Decrease the Zoloft to half a tablet this week, then stop next Monday.  Stop the omeprazole    Increase the hydroxyurea to 3000 mg (3 tabs)  We will add Ketamine to the pain plan.    I will plan to see

## 2022-01-17 NOTE — TELEPHONE ENCOUNTER
Pt called in about 1130 to state that she wants to leave the clinic d/t ongoing pain.     Pt had labs drawn; sitting in waiting room.  Unable to reach RNCC    Paged Dr. Duncan who was parking in ramp. Dr. Duncan would like pt to keep apt because they are discussing pain management. Has to see her in person before can approve any outpatient infusions.     Called Jennifer who is crying and states she just can't wait d/t pain meds. States she didn't bring any along and is in so much pain. Encouraged Jennifer to stay and that Dr. Duncan is almost in the building and really wants to see her to discuss ongoing pain management. Dr. Duncan is the one who can help her best. Call was disconnected by pt.    Updated Dr. Duncan.  Asked in clinic triage nurse to reach out to Jennifer but Triage nurse did not see pt in waiting room.

## 2022-01-17 NOTE — PROGRESS NOTES
ANTICOAGULATION MANAGEMENT     Jennifer Cervantes 21 year old female is on warfarin with subtherapeutic INR result. (Goal INR 2.0-3.0)    Recent labs: (last 7 days)     01/17/22  1117   INR 1.24*       ASSESSMENT     Source(s): Chart Review and Patient/Caregiver Call       Warfarin doses taken: Warfarin taken as instructed    Diet: No new diet changes identified    New illness, injury, or hospitalization: No    Medication/supplement changes: None noted    Signs or symptoms of bleeding or clotting: No    Previous INR: Subtherapeutic    Additional findings: Patient has NOT been in therapeutic range since November as inpatient.  Writer discussed with Tati recommendation going forward.  Patient and writer discussed tablet stregth and color.  Jennifer denies missed doses.       PLAN     Recommended plan for no diet, medication or health factor changes affecting INR     Dosing Instructions: Booster dose then continue your current warfarin dose  with next INR in 4 days       Summary  As of 1/17/2022    Full warfarin instructions:  1/17: 30 mg; Otherwise 20 mg every day   Next INR check:  1/21/2022             Telephone call with Jennifer who agrees to plan and repeated back plan correctly    Lab visit scheduled    Education provided: Please call back if any changes to your diet, medications or how you've been taking warfarin, Importance of therapeutic range, Importance of following up at instructed interval and Importance of taking warfarin as instructed    Plan made with [facility] Pharmacist Prince Arndt, RN  Anticoagulation Clinic  1/17/2022    _______________________________________________________________________     Anticoagulation Episode Summary     Current INR goal:  2.0-3.0   TTR:  0.0 % (1.6 mo)   Target end date:  Indefinite   Send INR reminders to:  [facility]    Indications    Chronic pulmonary embolism without acute cor pulmonale  unspecified pulmonary embolism type (H) [I27.82]            Comments:  [facility] [phone]  [facility] [phone]         Anticoagulation Care Providers     Provider Role Specialty Phone number    Eric Duncan MD Referring Pediatric Hematology-Oncology [phone]

## 2022-01-17 NOTE — PROGRESS NOTES
"Oncology Rooming Note    January 17, 2022 11:32 AM   Jennifer Cervantes is a 21 year old female who presents for:    Chief Complaint   Patient presents with     Port Draw     Labs drawn via port by RN in lab. VS taken.      Oncology Clinic Visit     sickle cell disease     Initial Vitals: /83   Pulse 110   Temp 97.7  F (36.5  C) (Tympanic)   Resp 14   Wt 75.2 kg (165 lb 12.8 oz)   SpO2 96%   BMI 28.46 kg/m   Estimated body mass index is 28.46 kg/m  as calculated from the following:    Height as of 1/8/22: 1.626 m (5' 4\").    Weight as of this encounter: 75.2 kg (165 lb 12.8 oz). Body surface area is 1.84 meters squared.  Worst Pain (10) Comment: Data Unavailable   No LMP recorded. Patient has had an injection.  Allergies reviewed: Yes  Medications reviewed: Yes    Medications: Medication refills not needed today.  Pharmacy name entered into [facility]:    [facility] - [place], [place] - [address] [place] [address] [facility]ARM[facility] [place], [place] - [address] [place] [address] [phone]    Clinical concerns: pain Perla was notified.      Syl Kramer RN     Adult Sickle Cell Outpatient Visit  Jan 17, 2022    Reason for Visit: Follow up of sickle cell disease    The visit today was done with David Bourne MD (fellow) and Jennifer's mother was also on the phone throughout.     She is following up for pain management and anticoagulation with a primary focus for today being pain management strategy and adherence.    Interval History:  Jennifer is back today for follow-up.  Our last visit was about a month ago.  In that time, her acute healthcare utilization been extreme.  She has been to the infusion clinic nearly every day that is open with additional ED visits sprinkled throughout the last month, including on some of the same days as the infusion visit.  It has been a challenge to go a day without having her call him.  We have allowed those infusions to date but they have continued unabated.  She had " labs last week during her visit which also raised concerns for nonadherence to hydroxyurea.  Her peripheral smear was done which look like an untreated sample of sickle cell blood.  Her fetal hemoglobin percentage was only 4% as well.  She also came into the visit very frustrated because she was concerned that I was late, that we actually started the visit 10 minutes early.  She is also frustrated because her overall prescriptions at home have been reduced 10% due to not adhering with other parts of the medical planning.    With that context in mind, the visit started off a bit tense.  Her mom is on the phone and we discussed the various components of my concerns regarding her overall care at this point.  We talked about the excessive use of the infusion clinic.  I raised my concerns about adherence to hydroxyurea as well.  I also have received information from home care and pharmacy that she has been refusing INR checks as well as the Desferal infusions.  I then offered the opportunity to hear their side of the story, since I know its been primarily just the medical side.  On the infusion clinic side, she just reports that winter pain has been significant.  When I asked if there are other stressors, since I know that stress is a big trigger for her, she declined any other major changes.  She said that she had a good holidays and that was not linked to this.  She did ask about oxycodone prescription which I stood by my past decision to reduce the overall dosing.  I shared that my concern is that her pain is clearly nonresponsive to opiates as evidenced by the frequency of use, the reality that her refill requests are, by mathematical calculations, too frequent to actually be using at the prescribed frequency.  I did also share that if she declines visits for other reasons, then infusion visits will be cut off.  I think it is very important for patients to understand that we are a hematology Comprehensive Care  clinic, not a pain clinic.  She endorsed that understanding    From the laboratory and Desferal side, she said that she does answer all of her calls and is not sure why there is lack of communication or why I am hearing that she is not doing those labs.  She did say that she is taking her warfarin.  She said that she is not sure why there is been a lack of communication but they have not been showing up.  She and mom have also been frustrated that the port needle is often not removed by the nurse at the end of the weekend.  This was news to me so I asked for more clarification.  She says that when they are there, it does get given but it assumed, at least by she and mom, they supposed to take care of it at the end which was not the agreement.  I said I would follow-up.  It sounds like may be the last time they got the Desferal was a month ago though I need to confirm that.  It does not sound like she has gotten it in January    For the warfarin, I was unable to figure out whether dietary changes have meant that her INR has been chronically low but they have been essentially since we made the transition.  I did ask the pharmacy to make a standard across-the-board daily dose rather than alternating doses given the complexity of her polypharmacy.  I also am reducing her medication list by stopping her sertraline and Abilify (sertraline weaning down over a week) as she is not finding any benefit and that is just more medications to take.  We also reduced some of her other nonregular medications.    1 further topics she wanted follow-up on was her port.  As we have discussed previously, she did bring up that while her port is not tilted, she has a lot of keloid over it and she is concerned about that.  She wants to see If she can get a different port placement so I put in an IR referral again.      Ultimately, the visit was long but hopefully fruitful.  I did stress that we are trying to do these things including  restricting pain options, and her best interest.  I did agree to modify her ED plan to use utilize ketamine as an opioid-sparing agent.  The ED has used this for her successfully a couple times in the last few months.  We will not be doing that on the infusion side though.  By the end of the visit, I think we made some progress though I still sensed she is skeptical about the overall approach    Current Outpatient Medications   Medication Sig Dispense Refill     albuterol (PROAIR HFA/PROVENTIL HFA/VENTOLIN HFA) 108 (90 Base) MCG/ACT inhaler Inhale 2 puffs into the lungs every 6 hours as needed for shortness of breath / dyspnea or wheezing 8.5 g 3     Hydroxyurea 1000 MG TABS Take 3,000 mg by mouth daily 90 tablet 3     oxyCODONE IR (ROXICODONE) 15 MG tablet Take 1 tablet (15 mg) by mouth every 4 hours as needed for severe pain Goal 4 per day. Max 6 per day. 45 tablet 0     acetaminophen (TYLENOL) 325 MG tablet Take 2 tablets (650 mg) by mouth every 6 hours as needed for mild pain 120 tablet 3     albuterol (PROVENTIL) (2.5 MG/3ML) 0.083% neb solution Take 1 vial (2.5 mg) by nebulization every 6 hours as needed for shortness of breath / dyspnea or wheezing 12 mL 4     budesonide-formoterol (SYMBICORT) 160-4.5 MCG/ACT Inhaler Inhale 2 puffs into the lungs 2 times daily 10.2 g 3     deferasirox (JADENU) 360 MG tablet Take 4 tablets (1,440 mg) by mouth every evening 120 tablet 4     diphenhydrAMINE (BENADRYL) 25 MG capsule Take 1-2 capsules (25-50 mg) by mouth nightly as needed for sleep 60 capsule 3     EPINEPHrine (ANY BX GENERIC EQUIV) 0.3 MG/0.3ML injection 2-pack Inject 0.3 mLs (0.3 mg) into the muscle as needed for anaphylaxis 1 each 1     lidocaine-prilocaine (EMLA) 2.5-2.5 % external cream Apply topically once for 1 dose Use for port site as needed (Patient not taking: Reported on 11/10/2021) 30 g 1     medroxyPROGESTERone (DEPO-PROVERA) 150 MG/ML IM injection Inject 150 mg into the muscle       morphine (MS  CONTIN) 15 MG CR tablet Take 1 tablet (15 mg) by mouth every 12 hours 60 tablet 0     naloxone (NARCAN) 4 MG/0.1ML nasal spray Spray 1 spray (4 mg) into one nostril alternating nostrils once as needed for opioid reversal every 2-3 minutes until assistance arrives 0.2 mL 1     ondansetron (ZOFRAN) 8 MG tablet Take 1 tablet (8 mg) by mouth every 8 hours as needed 30 tablet 1     warfarin ANTICOAGULANT (COUMADIN) 5 MG tablet Take 2 to 3 tablets by mouth daily or as directed by the Coumadin clinic. 60 tablet 1     warfarin ANTICOAGULANT (COUMADIN) 5 MG tablet Take 1 tablet (5 mg) by mouth four times a week Take 5 mg on Tues, Thurs, Sat, Sun. INR check on Tues 11/23 so dose can change. 16 tablet 0     warfarin ANTICOAGULANT (COUMADIN) 7.5 MG tablet Take 1 tablet (7.5 mg) by mouth three times a week Take 7.5 mg on Mon, Wed, Fri. INR check on Tues 11/23 so dose can change. 12 tablet 0       Past Medical History  Past Medical History:   Diagnosis Date     Anxiety      Bleeding disorder (H)      Blood clotting disorder (H)      Cerebral infarction (H) 2015     Cognitive developmental delay     low IQ. 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 (H)     right hand contractures     Iron overload due to repeated red blood cell transfusions      Migraines      Multiple subsegmental pulmonary emboli without acute cor pulmonale (H) 02/01/2021     Oppositional defiant behavior      Superficial venous thrombosis of arm, right 03/25/2021     Uncomplicated asthma      Past Surgical History:   Procedure Laterality Date     AS INSERT TUNNELED CV 2 CATH W/O PORT/PUMP       CHOLECYSTECTOMY       CV RIGHT HEART CATH MEASUREMENTS RECORDED N/A 11/18/2021    Procedure: Right Heart Cath;  Surgeon: Jackson Stauffer MD;  Location:  HEART CARDIAC CATH LAB     INSERT PORT VASCULAR ACCESS Left 4/21/2021     Procedure: INSERTION, VASCULAR ACCESS PORT (NOT SURE ON SIDE UNTIL REMOVAL);  Surgeon: Rajan More MD;  Location: [facility] OR     IR CHEST PORT PLACEMENT > 5 YRS OF AGE  4/21/2021     IR CVC NON TUNNEL LINE REMOVAL  5/6/2021     IR CVC NON TUNNEL PLACEMENT  04/07/2020     IR CVC NON TUNNEL PLACEMENT  4/30/2021     IR PORT REMOVAL LEFT  4/21/2021     REMOVE PORT VASCULAR ACCESS Left 4/21/2021    Procedure: REMOVAL, VASCULAR ACCESS PORT LEFT;  Surgeon: Rajan More MD;  Location: [facility] OR     REPAIR TENDON ELBOW Right 10/02/2019    Procedure: Right Elbow Flexor Lengthening, Flexor Pronator Slide Of Wrist and Finger, Thumb Adductor Lengthening;  Surgeon: Anai Franco MD;  Location: [facility]     TONSILLECTOMY Bilateral 10/02/2019    Procedure: Bilateral Tonsillectomy;  Surgeon: Farhana Guy MD;  Location: [facility] OR     ZZC BREAST REDUCTION (INCLUDES LIPO) TIER 3 Bilateral 04/23/2019     Allergies   Allergen Reactions     Contrast Dye      Hives and breathing issues     Fish-Derived Products Hives     Seafood Hives     Diagnostic X-Ray Materials      Gadolinium      Social History   Social History     Tobacco Use     Smoking status: Never Smoker     Smokeless tobacco: Never Used   Substance Use Topics     Alcohol use: Not Currently     Alcohol/week: 0.0 standard drinks     Drug use: Never      Past medical history and social history were reviewed.    Physical Examination:  /83   Pulse 110   Temp 97.7  F (36.5  C) (Tympanic)   Resp 14   Wt 75.2 kg (165 lb 12.8 oz)   SpO2 96%   BMI 28.46 kg/m    Wt Readings from Last 10 Encounters:   01/17/22 75.2 kg (165 lb 12.8 oz)   01/12/22 77.9 kg (171 lb 12.8 oz)   01/08/22 77.1 kg (170 lb)   01/02/22 77.1 kg (170 lb)   12/27/21 76.5 kg (168 lb 10.4 oz)   12/25/21 77.1 kg (170 lb)   12/20/21 77.1 kg (170 lb)   12/15/21 77.1 kg (170 lb)   12/12/21 77.1 kg (170 lb)   12/10/21 77.1 kg (170 lb)       General: frustrated initially, though we had a cooperative  discussion throughout most of the visit.   Skin: No visualized rash or lesions on visualized skin. Did not evaluate port today  Eyes: EOMI, mild sclera icterus, no discharge noted  Resp: Appears to be breathing comfortably without accessory muscle usage, speaking in full sentences, no cough  MSK: Appears to have normal range of motion based on visualized movements  Neurologic: No apparent tremors, facial movements symmetric, known right sided weakness with wrist contracture and right-sided limp unchanged.  Psych: affect somewhat labile, mostly tearful, but she has good insight.    Laboratory Data:  Results for HIMANSHU AL (MRN [id number]) as of 1/22/2022 14:42   Ref. Range 1/12/2022 14:41   Hemoglobin A Latest Ref Range: 95.0 - 97.9 % 13.2 (L)   Hemoglobin A2 Latest Ref Range: 2.0 - 3.5 % 3.1   Hemoglobin C Latest Ref Range: 0.0 - 0.0 % 0.0   Hemoglobin E Latest Ref Range: 0.0 - 0.0 % 0.0   Hemoglobin F Latest Ref Range: 0.0 - 2.1 % 4.3 (H)   Hemoglobin S Latest Ref Range: 0.0 - 0.0 % 79.4 (H)   Hemoglobin Other Latest Ref Range: 0.0 - 0.0 % 0.0     Results for HIMANSHU AL (MRN [id number]) as of 1/22/2022 14:42   Ref. Range 1/17/2022 11:17   Sodium Latest Ref Range: 133 - 144 mmol/L 143   Potassium Latest Ref Range: 3.4 - 5.3 mmol/L 3.8   Chloride Latest Ref Range: 94 - 109 mmol/L 113 (H)   Carbon Dioxide Latest Ref Range: 20 - 32 mmol/L 20   Urea Nitrogen Latest Ref Range: 7 - 30 mg/dL 7   Creatinine Latest Ref Range: 0.52 - 1.04 mg/dL 0.45 (L)   GFR Estimate Latest Ref Range: >60 mL/min/1.73m2 >90   Calcium Latest Ref Range: 8.5 - 10.1 mg/dL 8.8   Anion Gap Latest Ref Range: 3 - 14 mmol/L 10   Albumin Latest Ref Range: 3.4 - 5.0 g/dL 3.9   Protein Total Latest Ref Range: 6.8 - 8.8 g/dL 7.8   Bilirubin Total Latest Ref Range: 0.2 - 1.3 mg/dL 2.2 (H)   Alkaline Phosphatase Latest Ref Range: 40 - 150 U/L 61   ALT Latest Ref Range: 0 - 50 U/L 44   AST Latest Ref Range: 0 - 45 U/L 46 (H)   Glucose Latest Ref  Range: 70 - 99 mg/dL 104 (H)   WBC Latest Ref Range: 4.0 - 11.0 10e3/uL 8.2   Hemoglobin Latest Ref Range: 11.7 - 15.7 g/dL 6.7 (LL)   Hematocrit Latest Ref Range: 35.0 - 47.0 % 20.2 (L)   Platelet Count Latest Ref Range: 150 - 450 10e3/uL 391   RBC Count Latest Ref Range: 3.80 - 5.20 10e6/uL 2.25 (L)   MCV Latest Ref Range: 78 - 100 fL 90   MCH Latest Ref Range: 26.5 - 33.0 pg 29.8   MCHC Latest Ref Range: 31.5 - 36.5 g/dL 33.2   RDW Latest Ref Range: 10.0 - 15.0 % 21.2 (H)   % Neutrophils Latest Units: % 74   % Lymphocytes Latest Units: % 14   % Monocytes Latest Units: % 9   % Eosinophils Latest Units: % 2   % Basophils Latest Units: % 0   % Myelocytes Latest Units: % 1   Absolute Basophils Latest Ref Range: 0.0 - 0.2 10e3/uL 0.0   NRBC/W Latest Ref Range: <=0 % 172 (H)   Absolute Neutrophil Latest Ref Range: 1.6 - 8.3 10e3/uL 6.1   Absolute Lymphocytes Latest Ref Range: 0.8 - 5.3 10e3/uL 1.1   Absolute Monocytes Latest Ref Range: 0.0 - 1.3 10e3/uL 0.7   Absolute Eosinophils Latest Ref Range: 0.0 - 0.7 10e3/uL 0.2   Absolute Myelocytes Latest Ref Range: <=0.0 10e3/uL 0.1 (H)   Absolute NRBCs Latest Ref Range: <=0.0 10e3/uL 14.1 (H)   RBC Morphology Unknown Confirmed RBC Indices   Platelet Morphology Latest Ref Range: Automated Count Confirmed. Platelet morphology is normal.  Automated Count Confirmed. Platelet morphology is normal.   Polychromasia Latest Ref Range: None Seen  Slight (A)   Sickle Cells Latest Ref Range: None Seen  Marked (A)   Target Cells Latest Ref Range: None Seen  Moderate (A)   INR Latest Ref Range: 0.85 - 1.15  1.24 (H)         Assessment and Plan:  1. Sickle Cell Disease  HgbSS complicated by hx stroke, acute chest, iron overload, chronic pain, acute on chronic pulmonary embolism with multiple hospitalizations. She has been in ED or infusion many times in the last several weeks for pain crisis which she attributes to weather or other causes. Her acute care utilization is excessive and was  the core topic of today's visit     Labs: Reviewed        Meds: Increase Hydrea to 3000mg daily (refilled).  She reports that she is taking this.  However, her hemoglobin F percentage is only 4% which is inconsistent with regular use.  Her peripheral smear from last week is also inconsistent with being on hydroxyurea. She and mom report some nonresponsiveness during her pediatric years.  I am not sure what to make of that, since #1 she was on chronic transfusions and hydroxyurea has not necessarily used in conjunction with that, but also #2, hydroxyurea would necessarily be less effective if the S percentages reduced by means of transfusion.  She has not had a transfusion in several months so that is not an consistent answer with her lab findings today.  However, if she is by some means less responsive, that is why we are increasing the dose today.     Pain Control: Continue MSContin 15mg BID. Continue Oxycodone to 15mg PRN max 6 per day-#50 tabs per week. DO NOT INCREASE. Continue Cymbalta 30mg BID. Continue Tylenol PRN.      Continue plan to avoid IV narcotics as able in ED to discourage frequent ED usage. Will okay ketamine in ED and modify the pain plan. Still recommend avoiding IV narcotics. We will also be reducing the infusion clinic use to a max 2x/week.  We spent a lot of this visit discussing appropriate pain management, adherence to her overall plan, trying to get clarity on her medication use, and most importantly, the far excessive use of acute care visits at this point.  We have tried lots of different options without success.  This is clearly gotten to the point that this is a chronic pain issue where it is opioid nonresponsive.  The fact that she calls an essentially daily is a sign that this is not a good plan; it is overall both a poor use of resources for our clinic but also represents that he strategy that worked in 2020 is no longer working in 2021 (she had 12 acute visits in 2020 and 95 in 2021  and she has had infusion visits nearly every day the month during January during business days).     Follow-up: Continue ANDREAS or MD monthly    Iron overload: continue Desferal and Jadenu at current dosing. Ferriscan ordered for January 25. There have been missed weeks for the Desferal. We will see how she has fared in the last few months with intermittent Desferal use since she had great results with the more regular use.     2. Neuro  History of stroke. Off ASA now that she is on warfarin.      Dr. Pierce doing botox for spasticity and symptoms improving.      Migraines, chronic issue, saw neurology. Has follow-up with specialist scheduled in February     3. Psych  History of anxiety and depression. Feels worse. She does not feel the sertraline works so we will wean back off. Discontinuing sertraline and Abilify today to reduce polypharmacy    Follows with outside therapist.     OK to use Benadryl PRN insomnia.      4. Pulm  History of asthma, continue Symbicort and Albuterol PRN. Pulm visit scheduled. Has home O2. Will see about getting her a home pulse oximeter. O2 92% on RA today.      5. Cardiovscular  Acute on chronic PE with DOAC failure, now on warfarin. Anticoag clinic managing. I did push for a higher dose at the same dose daily, since she has so many medications and alternating therapy increases the risk for mistakes or missed doses.  I am still at a loss to explain why her INR levels remain a level where it is clear that she is likely taking some, but we are having a lot of warfarin resistance otherwise.  I discussed dietary topics in the past.  I am challenged to find out more details about the amount of vitamin K in her diet but that is possibly a contributor.  Her INR tends to be in the 1.2-1.3 range, so unless the iron is causing actual hepatic dysfunction at this point, I do think that she is taking the warfarin. We talked today about her responsiveness to INR testing, as I have been notified by the  pharmacists and their lab team that she has frequently declined INR testing. She said she has not heard from them, though she answers all of the calls. I am not sure where the truth lies in between these two but I pledged to help with communication and that in the meantime, she should allow the INRs to be checked.    Will work with IR about way to evaluate port with contrast allergy.  She is worried about her keloiding whether she can get a new port placed somewhere else. IR order placed    She has also made statements about wanting to find other providers.  She did not state that with me today but she has made that to other members of the care team.  If that is the case, we want to make sure we do what is best for her.  If that means getting her to another center, we will help make that referral but it must be to a center that can provide comprehensive care for tumor.  It cannot be to just any center in our regional area because most are ineffective at caring for sickle cell disease in adults, particularly with her complexity.    65 minutes spent on the date of the encounter doing chart review, review of test results, interpretation of tests, patient visit and documentation     Next visit: 1/21/22 with Andrei Duncan MD  Hematologist  Division of Hematology, Oncology, and Transplantation  [facility]  [facility]  Pager: [phone]

## 2022-01-18 ENCOUNTER — INFUSION THERAPY VISIT (OUTPATIENT)
Dept: INFUSION THERAPY | Facility: CLINIC | Age: 22
End: 2022-01-18
Attending: PEDIATRICS
Payer: COMMERCIAL

## 2022-01-18 ENCOUNTER — PATIENT OUTREACH (OUTPATIENT)
Dept: ONCOLOGY | Facility: CLINIC | Age: 22
End: 2022-01-18
Payer: COMMERCIAL

## 2022-01-18 ENCOUNTER — TELEPHONE (OUTPATIENT)
Dept: ONCOLOGY | Facility: CLINIC | Age: 22
End: 2022-01-18
Payer: COMMERCIAL

## 2022-01-18 ENCOUNTER — PATIENT OUTREACH (OUTPATIENT)
Dept: CARE COORDINATION | Facility: CLINIC | Age: 22
End: 2022-01-18

## 2022-01-18 VITALS
TEMPERATURE: 98.3 F | DIASTOLIC BLOOD PRESSURE: 74 MMHG | SYSTOLIC BLOOD PRESSURE: 118 MMHG | RESPIRATION RATE: 16 BRPM | OXYGEN SATURATION: 99 % | HEART RATE: 90 BPM

## 2022-01-18 DIAGNOSIS — D57.00 SICKLE CELL DISEASE WITH CRISIS (H): Primary | ICD-10-CM

## 2022-01-18 DIAGNOSIS — G81.10 SPASTIC HEMIPLEGIA, UNSPECIFIED ETIOLOGY, UNSPECIFIED LATERALITY (H): Primary | ICD-10-CM

## 2022-01-18 DIAGNOSIS — Z95.828 PORT-A-CATH IN PLACE: ICD-10-CM

## 2022-01-18 DIAGNOSIS — D57.00 SICKLE CELL PAIN CRISIS (H): ICD-10-CM

## 2022-01-18 PROCEDURE — 96376 TX/PRO/DX INJ SAME DRUG ADON: CPT

## 2022-01-18 PROCEDURE — 258N000003 HC RX IP 258 OP 636: Performed by: PEDIATRICS

## 2022-01-18 PROCEDURE — 96374 THER/PROPH/DIAG INJ IV PUSH: CPT

## 2022-01-18 PROCEDURE — 250N000011 HC RX IP 250 OP 636: Performed by: PEDIATRICS

## 2022-01-18 PROCEDURE — 96361 HYDRATE IV INFUSION ADD-ON: CPT

## 2022-01-18 RX ORDER — MORPHINE SULFATE 2 MG/ML
2 INJECTION, SOLUTION INTRAMUSCULAR; INTRAVENOUS
Status: DISCONTINUED | OUTPATIENT
Start: 2022-01-18 | End: 2022-01-18 | Stop reason: HOSPADM

## 2022-01-18 RX ORDER — ONDANSETRON 8 MG/1
8 TABLET, FILM COATED ORAL
Status: CANCELLED
Start: 2022-04-01

## 2022-01-18 RX ORDER — DIPHENHYDRAMINE HCL 25 MG
25 CAPSULE ORAL
Status: CANCELLED
Start: 2022-04-01

## 2022-01-18 RX ORDER — DIPHENHYDRAMINE HCL 25 MG
25 CAPSULE ORAL
Status: DISCONTINUED | OUTPATIENT
Start: 2022-01-18 | End: 2022-01-18 | Stop reason: HOSPADM

## 2022-01-18 RX ORDER — MORPHINE SULFATE 2 MG/ML
2 INJECTION, SOLUTION INTRAMUSCULAR; INTRAVENOUS
Status: CANCELLED
Start: 2022-04-01

## 2022-01-18 RX ORDER — HEPARIN SODIUM (PORCINE) LOCK FLUSH IV SOLN 100 UNIT/ML 100 UNIT/ML
5 SOLUTION INTRAVENOUS
Status: DISCONTINUED | OUTPATIENT
Start: 2022-01-18 | End: 2022-01-18 | Stop reason: HOSPADM

## 2022-01-18 RX ORDER — ONDANSETRON 4 MG/1
8 TABLET, ORALLY DISINTEGRATING ORAL
Status: DISCONTINUED | OUTPATIENT
Start: 2022-01-18 | End: 2022-01-18 | Stop reason: HOSPADM

## 2022-01-18 RX ORDER — HEPARIN SODIUM (PORCINE) LOCK FLUSH IV SOLN 100 UNIT/ML 100 UNIT/ML
5 SOLUTION INTRAVENOUS
Status: CANCELLED | OUTPATIENT
Start: 2022-04-01

## 2022-01-18 RX ORDER — HEPARIN SODIUM,PORCINE 10 UNIT/ML
5 VIAL (ML) INTRAVENOUS
Status: CANCELLED | OUTPATIENT
Start: 2022-04-01

## 2022-01-18 RX ADMIN — DEXTROSE AND SODIUM CHLORIDE 500 ML: 5; 450 INJECTION, SOLUTION INTRAVENOUS at 09:08

## 2022-01-18 RX ADMIN — MORPHINE SULFATE 2 MG: 2 INJECTION, SOLUTION INTRAMUSCULAR; INTRAVENOUS at 09:08

## 2022-01-18 RX ADMIN — MORPHINE SULFATE 2 MG: 2 INJECTION, SOLUTION INTRAMUSCULAR; INTRAVENOUS at 10:06

## 2022-01-18 RX ADMIN — Medication 5 ML: at 13:38

## 2022-01-18 RX ADMIN — MORPHINE SULFATE 2 MG: 2 INJECTION, SOLUTION INTRAMUSCULAR; INTRAVENOUS at 11:09

## 2022-01-18 NOTE — PROGRESS NOTES
Pt called to schedule IR port check, pt scheduled for 1/24 at 12 pm at [facility]. Informed she needs a covid pcr test by this Friday 1/21. Order entered for scheduling to add on 1/21, she will already be coming to [facility] for labs that morning.

## 2022-01-18 NOTE — PATIENT INSTRUCTIONS
Dear Jennifer Cervantes    Thank you for choosing [facility] ([facility]) for your infusion.  The following information is a summary of our appointment as well as important reminders.      We look forward in seeing you on your next appointment here at [facility] ([facility]).  Please don t hesitate to call us at [phone] to reschedule any of your appointments or to speak with one of the [facility] registered nurses.  It was a pleasure taking care of you today.    Sincerely,    [facility]  [facility]  [address] [place] [address]  [address]  Phone:  [phone]

## 2022-01-18 NOTE — TELEPHONE ENCOUNTER
Pt called in to triage requesting IVF pain meds for lower back pain rated 9/10 x 2 days. Stated last took prn oxy and MS contin at 6 a.m. this morning without relief. Denied any fevers, chills, cough, sob, chest pain, numbness or tingling or other symptoms not typical of sickle cell pain.   Pt's last infusion was 1/16 (ED), last clinic visit 1/17/22 with Dr. Duncan with follow-up on 1/21/22 with Andrei Machado.     Patient states they do not have own ride and it will take 25 minutes long to get to cancer clinic. Transportation assistance needed.    Pt denied being out of home medications and needing any refills today.     Pt qualifies for sickle cell standing order protocol.    If you do not hear from the infusion center by 2pm then you will not be able to get in for an infusion today.     [facility] can offer 0900 or 0930 apt today.  Called Jennifer who confirmed she can come to the clinic at that time.  Will contact [employer] at 0800 to assist with a ride.  [facility] Charge nurse updated.  IB sent to scheduling.  [employer] contacted at 0800 and will arrange transportation.    Routed to Dr. Duncan and Care Team.

## 2022-01-18 NOTE — LETTER
1/18/2022         RE: Jennifer Cervantes  [address]  [facility] [address]        Dear Colleague,    Thank you for referring your patient, Jennifer Cervantes, to the [facility] TREAT[facility]. Please see a copy of my visit note below.    Nursing Note  Jennifer Cervantes [facility] for:   Chief Complaint   Patient presents with     Infusion     IV Hydration and Pain Management     During today's [facility] appointment, orders from  were completed.  Frequency: as needed    Progress note:  Patient identification verified by name and date of birth.  Assessment completed.  Vitals recorded in Doc Flowsheets.  Patient was provided with education regarding medication/procedure and possible side effects.  Patient verbalized understanding.     present during visit today: Not Applicable.    Treatment Conditions: Morphine administered Q1H x 3 per orders. Pt declined antiemetic and diphenhydramine today.     Premedications: were administered.     Drug Waste Record: No    Infusion length and rate:  infusion given over approximately 2 hours  250 ml/hr.    Labs: were drawn per orders.     Vascular access: port accessed today.    Is the next appt scheduled? PRN  Asymptomatic COVID test completed? no    Post Infusion Assessment:  Patient tolerated infusion without incident.     Discharge Plan:   Follow up plan of care with: ongoing infusions at [facility]. and primary care provider,  Discharge instructions were reviewed with patient.  Patient/representative verbalized understanding of discharge instructions and all questions answered.  Patient discharged from [facility] in stable condition.    Dolores Washington RN    Administrations This Visit     dextrose 5% and 0.45% NaCl BOLUS     Admin Date  01/18/2022 Action  New Bag Dose  500 mL Rate  250 mL/hr  Route  Intravenous Administered By  Kaykay Son RN          morphine (PF) injection 2 mg     Admin Date  01/18/2022 Action  Given Dose  2 mg Route  Intravenous Administered By  Kaykay Son RN           Admin Date  01/18/2022 Action  Given Dose  2 mg Route  Intravenous Administered By  Dolores Washington RN           Admin Date  01/18/2022 Action  Given Dose  2 mg Route  Intravenous Administered By  Dolores Washington RN                /74 (BP Location: Left arm, Patient Position: Sitting)   Pulse 90   Temp 98.3  F (36.8  C) (Oral)   Resp 16   SpO2 99%         Again, thank you for allowing me to participate in the care of your patient.      Sincerely,    [facility]

## 2022-01-18 NOTE — PROGRESS NOTES
Outpatient IR  Referral    Patient is a 22 y/o female with a PMH of sickle cell disease, chronic back pain, asthma, migraines, gastritis, cerebral infarction w/ right sided hemiplegia,hemiparesis, PE on coumadin, anxiety, depression, panic disorder. Patient has a history of right sided innominate vein occlusion, access lines since age 2-3 ys old. IR has been asked to place a port a cath for history of Sickle Cell Disease, long history of transfusions, needs port for difficult access. I clarified IR referral for port placement with Dr. Duncan.  He notes patient has been having a lot of pain and infusion therapy has had access difficulties ongoing over the past year.  Last port access 1/12/21 in epic did not report difficulties. IR recommends a port check first as the port is functional and patient has limited access. Patient has a contrast dye allergy of hives and breathing issues, has low severity allergy to gadolinium, may need to use CO2 for port evaluation.     Patient has a Left chest wall port a cath placed 4/21/21, failed R external jugular collateral access due to inability to advance wire into R atrium.     4/21/21 Left port placement impression  Impression:   1. Failed right external jugular/collateral access due to inability to  advance wire into the right atrium.   2. Completed image guided placement of 8 Mongolian 25.5 cm single lumen  power injectable chest port via the left internal jugular vein with  catheter tip in the right atrium. Port functions well, is heparin  locked and ready for immediate use.  3. Completed removal of existing surgically placed left internal  jugular chest port which had become intermittently dysfunctional.     Plan: New single lumen chest port heparin locked and ready for  immediate use. Future central venous access will be difficult on the  right and would require recannulization attempt from above and  possibly below should left-sided central venous access  become  complicated.    Prior non tunneled left femoral catheter removed 5/6/21    Procedure order placed.     Primary team Dr. Duncan made aware of IR recommendations via epic messaging.     EITAN Cuadra CNP  Interventional Radiology   IR on-call pager: [phone]

## 2022-01-18 NOTE — PROGRESS NOTES
Social Work Note: Telephone Call  Oncology Clinic     Data/Intervention:  Patient Name:  Jennifer Cervantes  /Age: 2000, 21 years old     Call From: [facility]        Reason for Call:  Transportation     Assessment:   called [employer] ([phone]) to arrange ride. [employer] ([phone]) arranged  for patient from home with a will call return ride. Patient will need to call when ready for return ride home.      Plan:  Patient is aware of the transportation plan.  available to assist with any other needs.      CARLOS Chavez,[facility]  Hematology/Oncology Social Worker  Phone:[phone] Pager: [phone]

## 2022-01-18 NOTE — PROGRESS NOTES
Nursing Note  Jennifer Cervantes presents today to [facility] for:   Chief Complaint   Patient presents with     Infusion     IV Hydration and Pain Management     During today's [facility] appointment, orders from  were completed.  Frequency: as needed    Progress note:  Patient identification verified by name and date of birth.  Assessment completed.  Vitals recorded in Doc Flowsheets.  Patient was provided with education regarding medication/procedure and possible side effects.  Patient verbalized understanding.     present during visit today: Not Applicable.    Treatment Conditions: Morphine administered Q1H x 3 per orders. Pt declined antiemetic and diphenhydramine today.     Premedications: were administered.     Drug Waste Record: No    Infusion length and rate:  infusion given over approximately 2 hours  250 ml/hr.    Labs: were drawn per orders.     Vascular access: port accessed today.    Is the next appt scheduled? PRN  Asymptomatic COVID test completed? no    Post Infusion Assessment:  Patient tolerated infusion without incident.     Discharge Plan:   Follow up plan of care with: ongoing infusions at [facility]. and primary care provider,  Discharge instructions were reviewed with patient.  Patient/representative verbalized understanding of discharge instructions and all questions answered.  Patient discharged from [facility] in stable condition.    Dolores Washington RN    Administrations This Visit     dextrose 5% and 0.45% NaCl BOLUS     Admin Date  01/18/2022 Action  New Bag Dose  500 mL Rate  250 mL/hr Route  Intravenous Administered By  Kaykay Son RN          morphine (PF) injection 2 mg     Admin Date  01/18/2022 Action  Given Dose  2 mg Route  Intravenous Administered By  Kaykay Son RN           Admin Date  01/18/2022 Action  Given Dose  2 mg Route  Intravenous  Administered By  Dolores Washington RN           Admin Date  01/18/2022 Action  Given Dose  2 mg Route  Intravenous Administered By  Dolores Washington, JOE                /74 (BP Location: Left arm, Patient Position: Sitting)   Pulse 90   Temp 98.3  F (36.8  C) (Oral)   Resp 16   SpO2 99%

## 2022-01-19 ENCOUNTER — INFUSION THERAPY VISIT (OUTPATIENT)
Dept: INFUSION THERAPY | Facility: CLINIC | Age: 22
End: 2022-01-19
Attending: PEDIATRICS
Payer: COMMERCIAL

## 2022-01-19 ENCOUNTER — TELEPHONE (OUTPATIENT)
Dept: ONCOLOGY | Facility: CLINIC | Age: 22
End: 2022-01-19
Payer: COMMERCIAL

## 2022-01-19 ENCOUNTER — PATIENT OUTREACH (OUTPATIENT)
Dept: CARE COORDINATION | Facility: CLINIC | Age: 22
End: 2022-01-19
Payer: COMMERCIAL

## 2022-01-19 ENCOUNTER — NURSE TRIAGE (OUTPATIENT)
Dept: ONCOLOGY | Facility: CLINIC | Age: 22
End: 2022-01-19
Payer: COMMERCIAL

## 2022-01-19 VITALS
OXYGEN SATURATION: 100 % | HEART RATE: 106 BPM | TEMPERATURE: 98.5 F | RESPIRATION RATE: 16 BRPM | SYSTOLIC BLOOD PRESSURE: 130 MMHG | DIASTOLIC BLOOD PRESSURE: 83 MMHG

## 2022-01-19 DIAGNOSIS — D57.00 SICKLE CELL PAIN CRISIS (H): ICD-10-CM

## 2022-01-19 DIAGNOSIS — G81.10 SPASTIC HEMIPLEGIA, UNSPECIFIED ETIOLOGY, UNSPECIFIED LATERALITY (H): Primary | ICD-10-CM

## 2022-01-19 PROCEDURE — 96361 HYDRATE IV INFUSION ADD-ON: CPT

## 2022-01-19 PROCEDURE — 250N000011 HC RX IP 250 OP 636: Performed by: PEDIATRICS

## 2022-01-19 PROCEDURE — 258N000003 HC RX IP 258 OP 636: Performed by: PEDIATRICS

## 2022-01-19 PROCEDURE — 96376 TX/PRO/DX INJ SAME DRUG ADON: CPT

## 2022-01-19 PROCEDURE — 96374 THER/PROPH/DIAG INJ IV PUSH: CPT

## 2022-01-19 RX ORDER — MORPHINE SULFATE 2 MG/ML
2 INJECTION, SOLUTION INTRAMUSCULAR; INTRAVENOUS
Status: CANCELLED
Start: 2022-04-01

## 2022-01-19 RX ORDER — HEPARIN SODIUM,PORCINE 10 UNIT/ML
5 VIAL (ML) INTRAVENOUS
Status: CANCELLED | OUTPATIENT
Start: 2022-04-01

## 2022-01-19 RX ORDER — ONDANSETRON 8 MG/1
8 TABLET, FILM COATED ORAL
Status: CANCELLED
Start: 2022-04-01

## 2022-01-19 RX ORDER — MORPHINE SULFATE 2 MG/ML
2 INJECTION, SOLUTION INTRAMUSCULAR; INTRAVENOUS
Status: DISCONTINUED | OUTPATIENT
Start: 2022-01-19 | End: 2022-01-19 | Stop reason: HOSPADM

## 2022-01-19 RX ORDER — HEPARIN SODIUM (PORCINE) LOCK FLUSH IV SOLN 100 UNIT/ML 100 UNIT/ML
5 SOLUTION INTRAVENOUS
Status: DISCONTINUED | OUTPATIENT
Start: 2022-01-19 | End: 2022-01-19 | Stop reason: HOSPADM

## 2022-01-19 RX ORDER — HEPARIN SODIUM (PORCINE) LOCK FLUSH IV SOLN 100 UNIT/ML 100 UNIT/ML
5 SOLUTION INTRAVENOUS
Status: CANCELLED | OUTPATIENT
Start: 2022-04-01

## 2022-01-19 RX ORDER — DIPHENHYDRAMINE HCL 25 MG
25 CAPSULE ORAL
Status: CANCELLED
Start: 2022-04-01

## 2022-01-19 RX ADMIN — MORPHINE SULFATE 2 MG: 2 INJECTION, SOLUTION INTRAMUSCULAR; INTRAVENOUS at 14:50

## 2022-01-19 RX ADMIN — MORPHINE SULFATE 2 MG: 2 INJECTION, SOLUTION INTRAMUSCULAR; INTRAVENOUS at 12:58

## 2022-01-19 RX ADMIN — Medication 5 ML: at 15:05

## 2022-01-19 RX ADMIN — MORPHINE SULFATE 2 MG: 2 INJECTION, SOLUTION INTRAMUSCULAR; INTRAVENOUS at 13:57

## 2022-01-19 RX ADMIN — DEXTROSE AND SODIUM CHLORIDE 1000 ML: 5; 450 INJECTION, SOLUTION INTRAVENOUS at 12:54

## 2022-01-19 NOTE — TELEPHONE ENCOUNTER
----- Message from Eric Duncan MD sent at 1/18/2022  3:53 PM CST -----  Regarding: RE: fluid order  I would say 500/hr. Thank you  ----- Message -----  From: Kaykay Son RN  Sent: 1/18/2022  12:16 PM CST  To: Eric Duncan MD  Subject: fluid order                                      Hi Dr. Duncan,    We chatted today regarding Jennifer's request to get a full liter of fluids, which you gave an order to do so.    Going forward though, if we keep the same rate, it would be over 4 hours extending there appt time  by 2 hours.  This will make it harder to get her scheduled.    Can we increase her rate to 500ml/h (2H) or 333ml/h?    Please advise and I can fix the order.    Thank you,    Kaykay Son RN   [facility]  [phone]

## 2022-01-19 NOTE — PATIENT INSTRUCTIONS
Dear Jennifer Cervantes    Thank you for choosing [facility] ([facility]) for your infusion.  The following information is a summary of our appointment as well as important reminders.      We look forward in seeing you on your next appointment here at [facility] ([facility]).  Please don t hesitate to call us at [phone] to reschedule any of your appointments or to speak with one of the [facility] registered nurses.  It was a pleasure taking care of you today.    Sincerely,    [facility]  [facility]  [address] [place] [address]  [address]  Phone:  [phone]

## 2022-01-19 NOTE — PROGRESS NOTES
Social Work Note: Telephone Call  Oncology Clinic     Data/Intervention:  Patient Name:  Jennifer Cervantes  /Age: 2000     Call From: [facility]        Reason for Call:  Transportation      Assessment:   called [employer] ([phone]) to arrange ride through patient's insurance. [employer] arranged  for patient from home with [employer] ([phone]).  Patient will need to call when ready for return ride home. SW encouraged patient to call if there are any issues with their ride.      Plan:  Patient is aware of the transportation plan.  available to assist with any other needs.      CARLOS Chavez,[facility]  Hematology/Oncology Social Worker  Phone:[phone] Pager: [phone]

## 2022-01-19 NOTE — PROGRESS NOTES
Infusion Nursing Note:  Jennifer Cervantes presents today for IVF and pain meds.    Patient seen by provider today: No   present during visit today: Not Applicable.    Note:   IVF infused over approximately 2 hours.  Morphine X 3 doses.  Patient declined PRN zofran and benadryl    Intravenous Access:  Implanted Port.    Treatment Conditions:  Not Applicable.    Post Infusion Assessment:  Patient tolerated infusion without incident.  Blood return noted pre and post infusion.  Site patent and intact, free from redness, edema or discomfort.  No evidence of extravasations.  Access discontinued per protocol.     Discharge Plan:   Discharge instructions reviewed with: Patient.  Patient and/or family verbalized understanding of discharge instructions and all questions answered.  AVS to patient via [employer].  Patient will return PRN for next appointment.   Patient discharged in stable condition accompanied by: self.  Departure Mode: Ambulatory.    Administrations This Visit     dextrose 5% and 0.45% NaCl BOLUS     Admin Date  01/19/2022 Action  New Bag Dose  1,000 mL Rate  500 mL/hr Route  Intravenous Administered By  Claudia Landry RN          heparin 100 UNIT/ML injection 5 mL     Admin Date  01/19/2022 Action  Given Dose  5 mL Route  Intracatheter Administered By  Claudia Landry RN          morphine (PF) injection 2 mg     Admin Date  01/19/2022 Action  Given Dose  2 mg Route  Intravenous Administered By  Claudia Landry RN           Admin Date  01/19/2022 Action  Given Dose  2 mg Route  Intravenous Administered By  Claudia Landry RN           Admin Date  01/19/2022 Action  Given Dose  2 mg Route  Intravenous Administered By  Claudia Landry RN Julie Backhaus, RN

## 2022-01-19 NOTE — LETTER
1/19/2022         RE: Jennifer Cervantes  [address]  [place] [address]        Dear Colleague,    Thank you for referring your patient, Jennifer Cervantes, to the [facility]. Please see a copy of my visit note below.    Infusion Nursing Note:  Jennifer Cervantes presents today for IVF and pain meds.    Patient seen by provider today: No   present during visit today: Not Applicable.    Note:   IVF infused over approximately 2 hours.  Morphine X 3 doses.  Patient declined PRN zofran and benadryl    Intravenous Access:  Implanted Port.    Treatment Conditions:  Not Applicable.    Post Infusion Assessment:  Patient tolerated infusion without incident.  Blood return noted pre and post infusion.  Site patent and intact, free from redness, edema or discomfort.  No evidence of extravasations.  Access discontinued per protocol.     Discharge Plan:   Discharge instructions reviewed with: Patient.  Patient and/or family verbalized understanding of discharge instructions and all questions answered.  AVS to patient via [employer].  Patient will return PRN for next appointment.   Patient discharged in stable condition accompanied by: self.  Departure Mode: Ambulatory.    Administrations This Visit     dextrose 5% and 0.45% NaCl BOLUS     Admin Date  01/19/2022 Action  New Bag Dose  1,000 mL Rate  500 mL/hr Route  Intravenous Administered By  Claudia Landry RN          heparin 100 UNIT/ML injection 5 mL     Admin Date  01/19/2022 Action  Given Dose  5 mL Route  Intracatheter Administered By  Claudia Landry RN          morphine (PF) injection 2 mg     Admin Date  01/19/2022 Action  Given Dose  2 mg Route  Intravenous Administered By  Claudia Landry RN           Admin Date  01/19/2022 Action  Given Dose  2 mg Route  Intravenous Administered By  Claudia Landry RN           Admin Date  01/19/2022 Action  Given Dose  2 mg Route  Intravenous Administered By  Claudia Landry RN               Claudia Landry RN          Again, thank you for allowing me to participate in the care of your patient.      Sincerely,    [facility]

## 2022-01-19 NOTE — TELEPHONE ENCOUNTER
[facility] Triage    Incoming Call IVF and pain meds    Pt called in to triage requesting IVF pain meds for lower backpain rated 9/10  X last night days. Stated last took MS contin and oxy this morning without relief. Denied any fevers, chills, cough, sob, chest pain or other symptoms. Denies confusion, numbness, paralysis, vomiting, headache, vision issues, difficulty walking and/or talking. Pt's last infusion was yesterday, last clinic visit 1/17 with Perla with follow-up with  Andrei Machado on 1/21/22. Patient states they do not have own ride and it will take 60 minutes to get to cancer clinic. Pt denied being out of home medications and needing any refills today. Pt qualifies for sickle cell standing order protocol and verified with Andrei Rojas.    Patient has been in every other day with 1/18 16 14 12 11 - Teams Amanda Roth RNCC to double check plan.    Placed on waiting list    Routing to Perla Forbes and Amanda Roth    1 pm [facility] appt confirmed, ride set up and scheduling notified.

## 2022-01-20 ENCOUNTER — INFUSION THERAPY VISIT (OUTPATIENT)
Dept: ONCOLOGY | Facility: CLINIC | Age: 22
End: 2022-01-20
Attending: PEDIATRICS
Payer: COMMERCIAL

## 2022-01-20 ENCOUNTER — PATIENT OUTREACH (OUTPATIENT)
Dept: CARE COORDINATION | Facility: CLINIC | Age: 22
End: 2022-01-20
Payer: COMMERCIAL

## 2022-01-20 ENCOUNTER — HOME INFUSION (PRE-WILLOW HOME INFUSION) (OUTPATIENT)
Dept: PHARMACY | Facility: CLINIC | Age: 22
End: 2022-01-20

## 2022-01-20 ENCOUNTER — TELEPHONE (OUTPATIENT)
Dept: ONCOLOGY | Facility: CLINIC | Age: 22
End: 2022-01-20
Payer: COMMERCIAL

## 2022-01-20 VITALS
HEART RATE: 111 BPM | DIASTOLIC BLOOD PRESSURE: 76 MMHG | RESPIRATION RATE: 18 BRPM | TEMPERATURE: 98 F | OXYGEN SATURATION: 98 % | SYSTOLIC BLOOD PRESSURE: 123 MMHG

## 2022-01-20 DIAGNOSIS — G81.10 SPASTIC HEMIPLEGIA, UNSPECIFIED ETIOLOGY, UNSPECIFIED LATERALITY (H): Primary | ICD-10-CM

## 2022-01-20 DIAGNOSIS — D57.00 SICKLE CELL PAIN CRISIS (H): ICD-10-CM

## 2022-01-20 PROCEDURE — 96361 HYDRATE IV INFUSION ADD-ON: CPT

## 2022-01-20 PROCEDURE — 96376 TX/PRO/DX INJ SAME DRUG ADON: CPT

## 2022-01-20 PROCEDURE — 96374 THER/PROPH/DIAG INJ IV PUSH: CPT

## 2022-01-20 PROCEDURE — 250N000011 HC RX IP 250 OP 636: Performed by: PEDIATRICS

## 2022-01-20 PROCEDURE — 258N000003 HC RX IP 258 OP 636: Performed by: PEDIATRICS

## 2022-01-20 RX ORDER — HEPARIN SODIUM (PORCINE) LOCK FLUSH IV SOLN 100 UNIT/ML 100 UNIT/ML
5 SOLUTION INTRAVENOUS
Status: DISCONTINUED | OUTPATIENT
Start: 2022-01-20 | End: 2022-01-20 | Stop reason: HOSPADM

## 2022-01-20 RX ORDER — HEPARIN SODIUM,PORCINE 10 UNIT/ML
5 VIAL (ML) INTRAVENOUS
Status: CANCELLED | OUTPATIENT
Start: 2022-04-01

## 2022-01-20 RX ORDER — DIPHENHYDRAMINE HCL 25 MG
25 CAPSULE ORAL
Status: CANCELLED
Start: 2022-04-01

## 2022-01-20 RX ORDER — ONDANSETRON 8 MG/1
8 TABLET, FILM COATED ORAL
Status: CANCELLED
Start: 2022-04-01

## 2022-01-20 RX ORDER — MORPHINE SULFATE 2 MG/ML
2 INJECTION, SOLUTION INTRAMUSCULAR; INTRAVENOUS
Status: CANCELLED
Start: 2022-04-01

## 2022-01-20 RX ORDER — HEPARIN SODIUM (PORCINE) LOCK FLUSH IV SOLN 100 UNIT/ML 100 UNIT/ML
5 SOLUTION INTRAVENOUS
Status: CANCELLED | OUTPATIENT
Start: 2022-04-01

## 2022-01-20 RX ORDER — MORPHINE SULFATE 2 MG/ML
2 INJECTION, SOLUTION INTRAMUSCULAR; INTRAVENOUS
Status: COMPLETED | OUTPATIENT
Start: 2022-01-20 | End: 2022-01-20

## 2022-01-20 RX ADMIN — MORPHINE SULFATE 2 MG: 2 INJECTION, SOLUTION INTRAMUSCULAR; INTRAVENOUS at 13:51

## 2022-01-20 RX ADMIN — Medication 5 ML: at 13:59

## 2022-01-20 RX ADMIN — MORPHINE SULFATE 2 MG: 2 INJECTION, SOLUTION INTRAMUSCULAR; INTRAVENOUS at 12:53

## 2022-01-20 RX ADMIN — DEXTROSE AND SODIUM CHLORIDE 1000 ML: 5; 450 INJECTION, SOLUTION INTRAVENOUS at 11:44

## 2022-01-20 RX ADMIN — MORPHINE SULFATE 2 MG: 2 INJECTION, SOLUTION INTRAMUSCULAR; INTRAVENOUS at 11:46

## 2022-01-20 NOTE — PROGRESS NOTES
Infusion Nursing Note:  Jennifer Cervantes presents today for IVF/pain medication.    Patient seen by provider today: No   present during visit today: Not Applicable.    Note: Patient endorses non radiating constant sharp low back pain with PS 9/10. States that usual sickle cell crisis pain. Denies fever/chills. Denies nausea/vomiting. No new complaints made. Otherwise well.    Had 3 doses of Morphine, upon discharge patient PS 5/10. Patient feels better. Agreed with to be discharge.       Intravenous Access:  Implanted Port.    Treatment Conditions:  Not Applicable.      Post Infusion Assessment:  Patient tolerated infusion without incident.  Blood return noted pre and post infusion.  Site patent and intact, free from redness, edema or discomfort.  No evidence of extravasations.  Access discontinued per protocol.       Discharge Plan:   Patient declined prescription refills.  Discharge instructions reviewed with: Patient.  Patient and/or family verbalized understanding of discharge instructions and all questions answered.  AVS to patient via [employer].  Patient will return 1/21/22 for next appointment.   Patient discharged in stable condition accompanied by: self.  Departure Mode: Ambulatory.      GLORIA YANCEY RN

## 2022-01-20 NOTE — PROGRESS NOTES
Social Work Note: Telephone Call  Oncology Clinic     Data/Intervention:  Patient Name:  Jennifer Cervantes  /Age: 2000, 21 year old     Call From: [facility]        Reason for Call:  Transportation     Assessment:   called [employer] ([phone]) to arrange ride through patient's insurance. [employer] arranged  for patient from home with [employer] ([phone]).  Patient will need to call when ready for return ride home.      Plan:  Patient is aware of the transportation plan.  available to assist with any other needs.      CARLOS Chavez,[facility]  Hematology/Oncology Social Worker  Phone:[phone] Pager: [phone]

## 2022-01-20 NOTE — CONFIDENTIAL NOTE
Pt called in to triage requesting IVF pain meds for lower back pain rated 9/10 x 1 days. Stated last took prn Ms contin and oxycodone at 6 am this morning without relief. Denied any fevers, chills, cough, sob, chest pain, numbness or tingling or other symptoms not typical of sickle cell pain.   Pt's last infusion was 1/20/22, last clinic visit 1/17/22 with follow-up on 1/21/22 with Andrei HIGGINS.   Patient states they do not have own ride and it will take 60 minutes long to get to cancer clinic.   Pt denied being out of home medications and needing any refills today.   Pt qualifies for sickle cell standing order protocol. OK for 3 consecutive days of infusion per her written plan.   If you do not hear from the infusion center by 2pm then you will not be able to get in for an infusion today.   Please note, if you are late for your appt, you risk losing your infusion appt as it may delay another patient's infusion who arrived on time.     [facility] can take Jennifer for 11:30 IVF/Pain     Message sent to social workers to assist in setting up ride.     Call placed to Jennifer that she has appointment at 11:30 she confirms she can make it to the appointment.     Message sent to scheduling to schedule IVF/Pain appointment

## 2022-01-20 NOTE — PATIENT INSTRUCTIONS
Contact Numbers    [facility]: [phone]    Call with chills and/or temperature greater than or equal to 100.5, uncontrolled nausea/vomiting, diarrhea, constipation, dizziness, shortness of breath, chest pain, bleeding, unexplained bruising, or any new/concerning symptoms, questions/concerns.     If you are having any concerning symptoms or wish to speak to a provider before your next infusion visit, please call your care coordinator or triage to notify them so we can adequately serve you.       After Hours: [phone]    If after hours, weekends, or holidays, call main hospital  and ask for Oncology doctor on call.           Lab Results:  No results found for this or any previous visit (from the past 12 hour(s)).

## 2022-01-21 ENCOUNTER — NURSE TRIAGE (OUTPATIENT)
Dept: NURSING | Facility: CLINIC | Age: 22
End: 2022-01-21
Payer: COMMERCIAL

## 2022-01-21 ENCOUNTER — HOME INFUSION (PRE-WILLOW HOME INFUSION) (OUTPATIENT)
Dept: PHARMACY | Facility: CLINIC | Age: 22
End: 2022-01-21

## 2022-01-21 ENCOUNTER — VIRTUAL VISIT (OUTPATIENT)
Dept: ONCOLOGY | Facility: CLINIC | Age: 22
End: 2022-01-21
Attending: PEDIATRICS
Payer: COMMERCIAL

## 2022-01-21 ENCOUNTER — NURSE TRIAGE (OUTPATIENT)
Dept: ONCOLOGY | Facility: CLINIC | Age: 22
End: 2022-01-21
Payer: COMMERCIAL

## 2022-01-21 DIAGNOSIS — D57.00 SICKLE CELL PAIN CRISIS (H): Primary | ICD-10-CM

## 2022-01-21 DIAGNOSIS — I26.99 PULMONARY EMBOLISM, OTHER, UNSPECIFIED CHRONICITY, UNSPECIFIED WHETHER ACUTE COR PULMONALE PRESENT (H): ICD-10-CM

## 2022-01-21 PROCEDURE — 99215 OFFICE O/P EST HI 40 MIN: CPT | Mod: 95 | Performed by: PHYSICIAN ASSISTANT

## 2022-01-21 NOTE — Clinical Note
1/21/2022         RE: Jennifer Cervantes  [address]  [facility] [address]        Dear Colleague,    Thank you for referring your patient, Jennifer Cervantes, to the [facility]. Please see a copy of my visit note below.    Jennifer is a 21 year old who is being evaluated via a billable video visit.      How would you like to obtain your AVS? MyChart  If the video visit is dropped, the invitation should be resent by: Text to cell phone: [phone]  Will anyone else be joining your video visit? Geeta Rizzo Urmila VF    {If patient encounters technical issues they should call [phone]}    Video Start Time: 11:04am    Video-Visit Details    Type of service:  Video Visit    Video End Time: 11:26am    Originating Location (pt. 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      She was admitted 2/1/21-2/3/21 with a new PE, started on Rivaroxaban. Switched to Eliquis 3/25/21 with RUE DVT.     She was admitted 4/26/21-5/11/21 with sickle cell pain crisis complicated by worsening PE in setting of low Apixaban levels, acute hypoxic respiratory failure, pneumonia, acute chest syndrome s/p exchange transfusion on 4/30 and 5/4. After 2nd exchange her oxygen requirement dramatically improved from 20L to 1-2L 5/5 and she was off oxygen as of 5/6.      She was admitted  7/13/21-7/25/21 for acute on chronic PE, switched to dabigitran + ASA.      Due to worsening hypoxia she underwent VQ scan 11/10/21 which showed acute on chronic PE for which she was admitted 11/10-11/21. During admission was switched to warfarin. Echo WNL and no signs of pulm HTN on right heart cath. She did receive 1 unit pRBC for hgb 6.4 during admission.    She has not been admitted since that time. She has had fewer ED visits as we adjusted her plan to not receive IV narcotics in the ED. With this change she has been in the infusion center almost daily.      She was called today for hematology follow-up.    Interval History:  Jennifer was called today for follow-up. She continues to have diffuse pain. She otherwise is doing OK. Majority of visit spent discussing overall pain plan.    Current Outpatient Medications   Medication Sig Dispense Refill     acetaminophen (TYLENOL) 325 MG tablet Take 2 tablets (650 mg) by mouth every 6 hours as needed for mild pain 120 tablet 3     albuterol (PROAIR HFA/PROVENTIL HFA/VENTOLIN HFA) 108 (90 Base) MCG/ACT inhaler Inhale 2 puffs into the lungs every 6 hours as needed for shortness of breath / dyspnea or wheezing 8.5 g 3     albuterol (PROVENTIL) (2.5 MG/3ML) 0.083% neb solution Take 1 vial (2.5 mg) by nebulization every 6 hours as needed for shortness of breath / dyspnea or wheezing 12 mL 4     budesonide-formoterol (SYMBICORT) 160-4.5 MCG/ACT Inhaler Inhale 2 puffs into the lungs 2 times daily 10.2 g 3     deferasirox (JADENU) 360 MG tablet Take 4 tablets (1,440 mg) by mouth every evening 120 tablet 4     diphenhydrAMINE (BENADRYL) 25 MG capsule Take 1-2 capsules (25-50 mg) by mouth nightly as needed for sleep 60 capsule 3     EPINEPHrine (ANY BX GENERIC EQUIV) 0.3 MG/0.3ML injection 2-pack Inject 0.3 mLs (0.3 mg) into the muscle as needed for anaphylaxis 1 each 1     Hydroxyurea 1000 MG TABS Take 3,000 mg by mouth daily 90 tablet 3     lidocaine-prilocaine (EMLA) 2.5-2.5 %  external cream Apply topically once for 1 dose Use for port site as needed (Patient not taking: Reported on 11/10/2021) 30 g 1     medroxyPROGESTERone (DEPO-PROVERA) 150 MG/ML IM injection Inject 150 mg into the muscle       morphine (MS CONTIN) 15 MG CR tablet Take 1 tablet (15 mg) by mouth every 12 hours 60 tablet 0     naloxone (NARCAN) 4 MG/0.1ML nasal spray Spray 1 spray (4 mg) into one nostril alternating nostrils once as needed for opioid reversal every 2-3 minutes until assistance arrives 0.2 mL 1     ondansetron (ZOFRAN) 8 MG tablet Take 1 tablet (8 mg) by mouth every 8 hours as needed 30 tablet 1     oxyCODONE IR (ROXICODONE) 15 MG tablet Take 1 tablet (15 mg) by mouth every 4 hours as needed for severe pain Goal 4 per day. Max 6 per day. 45 tablet 0     warfarin ANTICOAGULANT (COUMADIN) 5 MG tablet Take 2 to 3 tablets by mouth daily or as directed by the Coumadin clinic. 60 tablet 1     warfarin ANTICOAGULANT (COUMADIN) 5 MG tablet Take 1 tablet (5 mg) by mouth four times a week Take 5 mg on Tues, Thurs, Sat, Sun. INR check on Tues 11/23 so dose can change. 16 tablet 0     warfarin ANTICOAGULANT (COUMADIN) 7.5 MG tablet Take 1 tablet (7.5 mg) by mouth three times a week Take 7.5 mg on Mon, Wed, Fri. INR check on Tues 11/23 so dose can change. 12 tablet 0       Past Medical History  Past Medical History:   Diagnosis Date     Anxiety      Bleeding disorder (H)      Blood clotting disorder (H)      Cerebral infarction (H) 2015     Cognitive developmental delay     low IQ. 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 (H)     right hand contractures     Iron overload due to repeated red blood cell transfusions      Migraines      Multiple subsegmental pulmonary emboli without acute cor pulmonale (H) 02/01/2021     Oppositional defiant behavior      Superficial venous thrombosis of  arm, right 03/25/2021     Uncomplicated asthma      Past Surgical History:   Procedure Laterality Date     AS INSERT TUNNELED CV 2 CATH W/O PORT/PUMP       CHOLECYSTECTOMY       CV RIGHT HEART CATH MEASUREMENTS RECORDED N/A 11/18/2021    Procedure: Right Heart Cath;  Surgeon: Jackson Stauffer MD;  Location:  HEART CARDI[facility] CATH LAB     INSERT PORT VASCULAR ACCESS Left 4/21/2021    Procedure: INSERTION, VASCULAR ACCESS PORT (NOT SURE ON SIDE UNTIL REMOVAL);  Surgeon: Rajan More MD;  Location: [facility] OR     IR CHEST PORT PLACEMENT > 5 YRS OF AGE  4/21/2021     IR CVC NON TUNNEL LINE REMOVAL  5/6/2021     IR CVC NON TUNNEL PLACEMENT  04/07/2020     IR CVC NON TUNNEL PLACEMENT  4/30/2021     IR PORT REMOVAL LEFT  4/21/2021     REMOVE PORT VASCULAR ACCESS Left 4/21/2021    Procedure: REMOVAL, VASCULAR ACCESS PORT LEFT;  Surgeon: Rajan More MD;  Location: [facility] OR     REPAIR TENDON ELBOW Right 10/02/2019    Procedure: Right Elbow Flexor Lengthening, Flexor Pronator Slide Of Wrist and Finger, Thumb Adductor Lengthening;  Surgeon: Anai Franco MD;  Location: [facility] OR     TONSILLECTOMY Bilateral 10/02/2019    Procedure: Bilateral Tonsillectomy;  Surgeon: Farahna Guy MD;  Location: [facility] OR     ZZC BREAST REDUCTION (INCLUDES LIPO) TIER 3 Bilateral 04/23/2019     Allergies   Allergen Reactions     Contrast Dye      Hives and breathing issues     Fish-Derived Products Hives     Seafood Hives     Diagnostic X-Ray Materials      Gadolinium      Social History   Social History     Tobacco Use     Smoking status: Never Smoker     Smokeless tobacco: Never Used   Substance Use Topics     Alcohol use: Not Currently     Alcohol/week: 0.0 standard drinks     Drug use: Never      Past medical history and social history were reviewed.    Physical Examination:  There were no vitals taken for this visit.  Wt Readings from Last 10 Encounters:   01/17/22 75.2 kg (165 lb 12.8 oz)   01/12/22 77.9 kg (171 lb 12.8 oz)    01/08/22 77.1 kg (170 lb)   01/02/22 77.1 kg (170 lb)   12/27/21 76.5 kg (168 lb 10.4 oz)   12/25/21 77.1 kg (170 lb)   12/20/21 77.1 kg (170 lb)   12/15/21 77.1 kg (170 lb)   12/12/21 77.1 kg (170 lb)   12/10/21 77.1 kg (170 lb)     Video physical exam  General: Patient appears well in no acute distress.   Skin: No visualized rash or lesions on visualized skin  Eyes: EOMI, no erythema, sclera icterus or discharge noted  Resp: Appears to be breathing comfortably without accessory muscle usage, speaking in full sentences, no cough  MSK: Appears to have normal range of motion based on visualized movements  Neurologic: No apparent tremors, facial movements symmetric  Psych: affect normal, alert and oriented    The rest of a comprehensive physical examination is deferred due to PHE (public health emergency) video restrictions    Laboratory Data:  Results for HIMANSHU AL (MRN [id number]) as of 1/21/2022 15:42   1/17/2022 11:17   Sodium 143   Potassium 3.8   Chloride 113 (H)   Carbon Dioxide 20   Urea Nitrogen 7   Creatinine 0.45 (L)   GFR Estimate >90   Calcium 8.8   Anion Gap 10   Albumin 3.9   Protein Total 7.8   Bilirubin Total 2.2 (H)   Alkaline Phosphatase 61   ALT 44   AST 46 (H)   Glucose 104 (H)   WBC 8.2   Hemoglobin 6.7 (LL)   Hematocrit 20.2 (L)   Platelet Count 391   RBC Count 2.25 (L)   MCV 90   MCH 29.8   MCHC 33.2   RDW 21.2 (H)   % Neutrophils 74   % Lymphocytes 14   % Monocytes 9   % Eosinophils 2   % Basophils 0   % Myelocytes 1   Absolute Basophils 0.0   NRBC/W 172 (H)   Absolute Neutrophil 6.1   Absolute Lymphocytes 1.1   Absolute Monocytes 0.7   Absolute Eosinophils 0.2   Absolute Myelocytes 0.1 (H)   Absolute NRBCs 14.1 (H)   RBC Morphology Confirmed RBC Indices   Platelet Morphology Automated Count Confirmed. Platelet morphology is normal.   Polychromasia Slight (A)   Sickle Cells Marked (A)   Target Cells Moderate (A)   INR 1.24 (H)       Assessment and Plan:  1. Sickle Cell HgbSS Disease  2.  Chronic Pain  Jennifer has been doing well in staying out of the ED lately, however in turn she has been in the infusion center almost daily (10 infusions and 4 ED visits since Jan 1). We discussed that the infusion center and ED should be reserved for an acute pain crisis and not be used to manage chronic pain. In her case it has been difficult to separate the two. Myself and Dr. Duncan discussed prior to visit and relayed to patient our concerns about her frequent need for IV narcotics in addition to her home opioids she takes daily as this is a poor approach to chronic pain.     We discussed the following plan to reduce opioid use for her sickle cell disease and chronic pain:  -Increase Hydrea to 3000mg daily to help lessen frequency of sickle cell pain  -Continue MS Contin and Oxycodone at home  -Limit infusion center visits to two times per week- outside of that should try to manage at home with current medications, heat, rest, compression, warm baths. Did update triage nurses on this  -If unable to manage at home can go to ED but continue to not do IV narcotics in the emergency room. Did add Ketamine to pain plan in ED  -Recommend we try Suboxone in setting of opioid tolerance/poorly controlled pain with chronic opioids. She was not interested at this time but we would continue to recommend this moving forward   -She would like to seek care with different hematology team. Discussed we support her in that as long as she is getting care. Offered to help with this transition but she declined     Continue Desferal for iron overload. Continue Warfarin for history of PE, following with anticoag clinic, not discussed.     She did not want to schedule follow-up. I discussed we would continue to care for her whenever she wanted to see us and she can call with any concerns.     45 minutes spent on the date of the encounter doing chart review, review of test results, interpretation of tests, patient visit and  documentation, discussion with Dr. Duncan.     Andrei Machado PA-C  Department of Hematology and Oncology  [facility]         Again, thank you for allowing me to participate in the care of your patient.        Sincerely,        RONALDO Allan

## 2022-01-21 NOTE — NURSING NOTE
Pt states her pain today in her lower back is a 10 and she really doesn't feel good. She requested we not fill out the distress screening. PO

## 2022-01-21 NOTE — Clinical Note
1/21/2022         RE: Jennifer Cervantes  [address]  [facility] [address]        Dear Colleague,    Thank you for referring your patient, Jennifer Cervantes, to the [facility]. Please see a copy of my visit note below.    Jennifer is a 21 year old who is being evaluated via a billable video visit.      How would you like to obtain your AVS? MyChart  If the video visit is dropped, the invitation should be resent by: Text to cell phone: [phone]  Will anyone else be joining your video visit? Geeta Rizzo Urmila VF    {If patient encounters technical issues they should call [phone]}    Video Start Time: 11:04am    Video-Visit Details    Type of service:  Video Visit    Video End Time: 11:26am    Originating Location (pt. 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      She was admitted 2/1/21-2/3/21 with a new PE, started on Rivaroxaban. Switched to Eliquis 3/25/21 with RUE DVT.     She was admitted 4/26/21-5/11/21 with sickle cell pain crisis complicated by worsening PE in setting of low Apixaban levels, acute hypoxic respiratory failure, pneumonia, acute chest syndrome s/p exchange transfusion on 4/30 and 5/4. After 2nd exchange her oxygen requirement dramatically improved from 20L to 1-2L 5/5 and she was off oxygen as of 5/6.      She was admitted  7/13/21-7/25/21 for acute on chronic PE, switched to dabigitran + ASA.      Due to worsening hypoxia she underwent VQ scan 11/10/21 which showed acute on chronic PE for which she was admitted 11/10-11/21. During admission was switched to warfarin. Echo WNL and no signs of pulm HTN on right heart cath. She did receive 1 unit pRBC for hgb 6.4 during admission.    She has not been admitted since that time. She has had fewer ED visits as we adjusted her plan to not receive IV narcotics in the ED. With this change she has been in the infusion center almost daily.      She was called today for hematology follow-up.    Interval History:  Jennifer was called today for follow-up. She continues to have diffuse pain. She otherwise is doing OK. Majority of visit spent discussing overall pain plan.    Current Outpatient Medications   Medication Sig Dispense Refill     acetaminophen (TYLENOL) 325 MG tablet Take 2 tablets (650 mg) by mouth every 6 hours as needed for mild pain 120 tablet 3     albuterol (PROAIR HFA/PROVENTIL HFA/VENTOLIN HFA) 108 (90 Base) MCG/ACT inhaler Inhale 2 puffs into the lungs every 6 hours as needed for shortness of breath / dyspnea or wheezing 8.5 g 3     albuterol (PROVENTIL) (2.5 MG/3ML) 0.083% neb solution Take 1 vial (2.5 mg) by nebulization every 6 hours as needed for shortness of breath / dyspnea or wheezing 12 mL 4     budesonide-formoterol (SYMBICORT) 160-4.5 MCG/ACT Inhaler Inhale 2 puffs into the lungs 2 times daily 10.2 g 3     deferasirox (JADENU) 360 MG tablet Take 4 tablets (1,440 mg) by mouth every evening 120 tablet 4     diphenhydrAMINE (BENADRYL) 25 MG capsule Take 1-2 capsules (25-50 mg) by mouth nightly as needed for sleep 60 capsule 3     EPINEPHrine (ANY BX GENERIC EQUIV) 0.3 MG/0.3ML injection 2-pack Inject 0.3 mLs (0.3 mg) into the muscle as needed for anaphylaxis 1 each 1     Hydroxyurea 1000 MG TABS Take 3,000 mg by mouth daily 90 tablet 3     lidocaine-prilocaine (EMLA) 2.5-2.5 %  external cream Apply topically once for 1 dose Use for port site as needed (Patient not taking: Reported on 11/10/2021) 30 g 1     medroxyPROGESTERone (DEPO-PROVERA) 150 MG/ML IM injection Inject 150 mg into the muscle       morphine (MS CONTIN) 15 MG CR tablet Take 1 tablet (15 mg) by mouth every 12 hours 60 tablet 0     naloxone (NARCAN) 4 MG/0.1ML nasal spray Spray 1 spray (4 mg) into one nostril alternating nostrils once as needed for opioid reversal every 2-3 minutes until assistance arrives 0.2 mL 1     ondansetron (ZOFRAN) 8 MG tablet Take 1 tablet (8 mg) by mouth every 8 hours as needed 30 tablet 1     oxyCODONE IR (ROXICODONE) 15 MG tablet Take 1 tablet (15 mg) by mouth every 4 hours as needed for severe pain Goal 4 per day. Max 6 per day. 45 tablet 0     warfarin ANTICOAGULANT (COUMADIN) 5 MG tablet Take 2 to 3 tablets by mouth daily or as directed by the Coumadin clinic. 60 tablet 1     warfarin ANTICOAGULANT (COUMADIN) 5 MG tablet Take 1 tablet (5 mg) by mouth four times a week Take 5 mg on Tues, Thurs, Sat, Sun. INR check on Tues 11/23 so dose can change. 16 tablet 0     warfarin ANTICOAGULANT (COUMADIN) 7.5 MG tablet Take 1 tablet (7.5 mg) by mouth three times a week Take 7.5 mg on Mon, Wed, Fri. INR check on Tues 11/23 so dose can change. 12 tablet 0       Past Medical History  Past Medical History:   Diagnosis Date     Anxiety      Bleeding disorder (H)      Blood clotting disorder (H)      Cerebral infarction (H) 2015     Cognitive developmental delay     low IQ. 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 (H)     right hand contractures     Iron overload due to repeated red blood cell transfusions      Migraines      Multiple subsegmental pulmonary emboli without acute cor pulmonale (H) 02/01/2021     Oppositional defiant behavior      Superficial venous thrombosis of  arm, right 03/25/2021     Uncomplicated asthma      Past Surgical History:   Procedure Laterality Date     AS INSERT TUNNELED CV 2 CATH W/O PORT/PUMP       CHOLECYSTECTOMY       CV RIGHT HEART CATH MEASUREMENTS RECORDED N/A 11/18/2021    Procedure: Right Heart Cath;  Surgeon: Jackson Stauffer MD;  Location:  HEART CARDIAC CATH LAB     INSERT PORT VASCULAR ACCESS Left 4/21/2021    Procedure: INSERTION, VASCULAR ACCESS PORT (NOT SURE ON SIDE UNTIL REMOVAL);  Surgeon: Rajan More MD;  Location: [facility] OR     IR CHEST PORT PLACEMENT > 5 YRS OF AGE  4/21/2021     IR CVC NON TUNNEL LINE REMOVAL  5/6/2021     IR CVC NON TUNNEL PLACEMENT  04/07/2020     IR CVC NON TUNNEL PLACEMENT  4/30/2021     IR PORT REMOVAL LEFT  4/21/2021     REMOVE PORT VASCULAR ACCESS Left 4/21/2021    Procedure: REMOVAL, VASCULAR ACCESS PORT LEFT;  Surgeon: Rajan More MD;  Location: [facility] OR     REPAIR TENDON ELBOW Right 10/02/2019    Procedure: Right Elbow Flexor Lengthening, Flexor Pronator Slide Of Wrist and Finger, Thumb Adductor Lengthening;  Surgeon: Anai Franco MD;  Location: [facility] OR     TONSILLECTOMY Bilateral 10/02/2019    Procedure: Bilateral Tonsillectomy;  Surgeon: Farhana Guy MD;  Location: [facility] OR     ZZC BREAST REDUCTION (INCLUDES LIPO) TIER 3 Bilateral 04/23/2019     Allergies   Allergen Reactions     Contrast Dye      Hives and breathing issues     Fish-Derived Products Hives     Seafood Hives     Diagnostic X-Ray Materials      Gadolinium      Social History   Social History     Tobacco Use     Smoking status: Never Smoker     Smokeless tobacco: Never Used   Substance Use Topics     Alcohol use: Not Currently     Alcohol/week: 0.0 standard drinks     Drug use: Never      Past medical history and social history were reviewed.    Physical Examination:  There were no vitals taken for this visit.  Wt Readings from Last 10 Encounters:   01/17/22 75.2 kg (165 lb 12.8 oz)   01/12/22 77.9 kg (171 lb 12.8 oz)    01/08/22 77.1 kg (170 lb)   01/02/22 77.1 kg (170 lb)   12/27/21 76.5 kg (168 lb 10.4 oz)   12/25/21 77.1 kg (170 lb)   12/20/21 77.1 kg (170 lb)   12/15/21 77.1 kg (170 lb)   12/12/21 77.1 kg (170 lb)   12/10/21 77.1 kg (170 lb)     Video physical exam  General: Patient appears well in no acute distress.   Skin: No visualized rash or lesions on visualized skin  Eyes: EOMI, no erythema, sclera icterus or discharge noted  Resp: Appears to be breathing comfortably without accessory muscle usage, speaking in full sentences, no cough  MSK: Appears to have normal range of motion based on visualized movements  Neurologic: No apparent tremors, facial movements symmetric  Psych: affect normal, alert and oriented    The rest of a comprehensive physical examination is deferred due to PHE (public health emergency) video restrictions    Laboratory Data:  Results for HIMANSHU AL (MRN [id number]) as of 1/21/2022 15:42   1/17/2022 11:17   Sodium 143   Potassium 3.8   Chloride 113 (H)   Carbon Dioxide 20   Urea Nitrogen 7   Creatinine 0.45 (L)   GFR Estimate >90   Calcium 8.8   Anion Gap 10   Albumin 3.9   Protein Total 7.8   Bilirubin Total 2.2 (H)   Alkaline Phosphatase 61   ALT 44   AST 46 (H)   Glucose 104 (H)   WBC 8.2   Hemoglobin 6.7 (LL)   Hematocrit 20.2 (L)   Platelet Count 391   RBC Count 2.25 (L)   MCV 90   MCH 29.8   MCHC 33.2   RDW 21.2 (H)   % Neutrophils 74   % Lymphocytes 14   % Monocytes 9   % Eosinophils 2   % Basophils 0   % Myelocytes 1   Absolute Basophils 0.0   NRBC/W 172 (H)   Absolute Neutrophil 6.1   Absolute Lymphocytes 1.1   Absolute Monocytes 0.7   Absolute Eosinophils 0.2   Absolute Myelocytes 0.1 (H)   Absolute NRBCs 14.1 (H)   RBC Morphology Confirmed RBC Indices   Platelet Morphology Automated Count Confirmed. Platelet morphology is normal.   Polychromasia Slight (A)   Sickle Cells Marked (A)   Target Cells Moderate (A)   INR 1.24 (H)       Assessment and Plan:  1. Sickle Cell HgbSS Disease  2.  Chronic Pain  Jennifer has been doing well in staying out of the ED lately, however in turn she has been in the infusion center almost daily (10 infusions and 4 ED visits since Jan 1). We discussed that the infusion center and ED should be reserved for an acute pain crisis and not be used to manage chronic pain. In her case it has been difficult to separate the two. Myself and Dr. Duncan discussed prior to visit and relayed to patient our concerns about her frequent need for IV narcotics in addition to her home opioids she takes daily as this is a poor approach to chronic pain.     We discussed the following plan to reduce opioid use for her sickle cell disease and chronic pain:  -Increase Hydrea to 3000mg daily to help lessen frequency of sickle cell pain  -Continue MS Contin and Oxycodone at home  -Limit infusion center visits to two times per week- outside of that should try to manage at home with current medications, heat, rest, compression, warm baths. Did update triage nurses on this  -If unable to manage at home can go to ED but continue to not do IV narcotics in the emergency room. Did add Ketamine to pain plan in ED  -Recommend we try Suboxone in setting of opioid tolerance/poorly controlled pain with chronic opioids. She was not interested at this time but we would continue to recommend this moving forward   -She would like to seek care with different hematology team. Discussed we support her in that as long as she is getting care. Offered to help with this transition but she declined     Continue Desferal for iron overload. Continue Warfarin for history of PE, following with anticoag clinic, not discussed.     She did not want to schedule follow-up. I discussed we would continue to care for her whenever she wanted to see us and she can call with any concerns.     45 minutes spent on the date of the encounter doing chart review, review of test results, interpretation of tests, patient visit and  documentation, discussion with Dr. Duncan.     Andrei Machado PA-C  Department of Hematology and Oncology  [facility]         Again, thank you for allowing me to participate in the care of your patient.        Sincerely,        RONALDO Allan

## 2022-01-21 NOTE — TELEPHONE ENCOUNTER
[facility] Triage    Patient calls in with complaints of pain in lower back, rated 10/10. State this is her typical sickle cell pain.    Took pain medicine at 6am: MS Contin and oxycodone, no relief.    Denies fevers, chills, numbness or tingling, no chest pain, no SOB.    No recent ED visits, has been in infusion 3 days in row.     Dr. Perla brian, okay to add on, has an appt today with Andrei and new plan for add on infusions will be discussed.

## 2022-01-21 NOTE — PROGRESS NOTES
Jennifer is a 21 year old who is being evaluated via a billable video visit.      How would you like to obtain your AVS? MyChart  If the video visit is dropped, the invitation should be resent by: Text to cell phone: [phone]  Will anyone else be joining your video visit? Geeta     [facility]      Video Start Time: 11:04am    Video-Visit Details    Type of service:  Video Visit    Video End Time: 11:26am    Originating Location (pt. 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      She was admitted 2/1/21-2/3/21 with a new PE, started on Rivaroxaban. Switched to Eliquis 3/25/21 with RUE DVT.     She was admitted 4/26/21-5/11/21 with sickle cell pain crisis complicated by worsening PE in setting of low Apixaban levels, acute hypoxic respiratory failure, pneumonia, acute chest syndrome s/p exchange transfusion on 4/30 and 5/4. After 2nd exchange her oxygen requirement dramatically improved from 20L to 1-2L 5/5 and she was off oxygen as of 5/6.      She was admitted 7/13/21-7/25/21 for acute on chronic PE, switched to dabigitran + ASA.      Due to worsening hypoxia she underwent VQ scan 11/10/21 which showed acute on chronic PE for which she was admitted 11/10-11/21. During admission was switched to warfarin. Echo WNL and no signs of pulm HTN on right heart cath. She did receive 1 unit pRBC  for hgb 6.4 during admission.    She has not been admitted since that time. She has had fewer ED visits as we adjusted her plan to not receive IV narcotics in the ED. With this change she has been in the infusion center almost daily.      She was called today for hematology follow-up.    Interval History:  Jennifer was called today for follow-up. She continues to have diffuse pain. She otherwise is doing OK. Majority of visit spent discussing overall pain plan.    Current Outpatient Medications   Medication Sig Dispense Refill     acetaminophen (TYLENOL) 325 MG tablet Take 2 tablets (650 mg) by mouth every 6 hours as needed for mild pain 120 tablet 3     albuterol (PROAIR HFA/PROVENTIL HFA/VENTOLIN HFA) 108 (90 Base) MCG/ACT inhaler Inhale 2 puffs into the lungs every 6 hours as needed for shortness of breath / dyspnea or wheezing 8.5 g 3     albuterol (PROVENTIL) (2.5 MG/3ML) 0.083% neb solution Take 1 vial (2.5 mg) by nebulization every 6 hours as needed for shortness of breath / dyspnea or wheezing 12 mL 4     budesonide-formoterol (SYMBICORT) 160-4.5 MCG/ACT Inhaler Inhale 2 puffs into the lungs 2 times daily 10.2 g 3     deferasirox (JADENU) 360 MG tablet Take 4 tablets (1,440 mg) by mouth every evening 120 tablet 4     diphenhydrAMINE (BENADRYL) 25 MG capsule Take 1-2 capsules (25-50 mg) by mouth nightly as needed for sleep 60 capsule 3     EPINEPHrine (ANY BX GENERIC EQUIV) 0.3 MG/0.3ML injection 2-pack Inject 0.3 mLs (0.3 mg) into the muscle as needed for anaphylaxis 1 each 1     Hydroxyurea 1000 MG TABS Take 3,000 mg by mouth daily 90 tablet 3     lidocaine-prilocaine (EMLA) 2.5-2.5 % external cream Apply topically once for 1 dose Use for port site as needed (Patient not taking: Reported on 11/10/2021) 30 g 1     medroxyPROGESTERone (DEPO-PROVERA) 150 MG/ML IM injection Inject 150 mg into the muscle       morphine (MS CONTIN) 15 MG CR tablet Take 1 tablet (15 mg) by mouth every 12 hours 60 tablet 0      naloxone (NARCAN) 4 MG/0.1ML nasal spray Spray 1 spray (4 mg) into one nostril alternating nostrils once as needed for opioid reversal every 2-3 minutes until assistance arrives 0.2 mL 1     ondansetron (ZOFRAN) 8 MG tablet Take 1 tablet (8 mg) by mouth every 8 hours as needed 30 tablet 1     oxyCODONE IR (ROXICODONE) 15 MG tablet Take 1 tablet (15 mg) by mouth every 4 hours as needed for severe pain Goal 4 per day. Max 6 per day. 45 tablet 0     warfarin ANTICOAGULANT (COUMADIN) 5 MG tablet Take 2 to 3 tablets by mouth daily or as directed by the Coumadin clinic. 60 tablet 1     warfarin ANTICOAGULANT (COUMADIN) 5 MG tablet Take 1 tablet (5 mg) by mouth four times a week Take 5 mg on Tues, Thurs, Sat, Sun. INR check on Tues 11/23 so dose can change. 16 tablet 0     warfarin ANTICOAGULANT (COUMADIN) 7.5 MG tablet Take 1 tablet (7.5 mg) by mouth three times a week Take 7.5 mg on Mon, Wed, Fri. INR check on Tues 11/23 so dose can change. 12 tablet 0       Past Medical History  Past Medical History:   Diagnosis Date     Anxiety      Bleeding disorder (H)      Blood clotting disorder (H)      Cerebral infarction (H) 2015     Cognitive developmental delay     low IQ. Please recognize when managing pain and planning with her     Depressive disorder      Hemiplegia and hemiparesis following cerebral infarction affecting right dominant side (H)     right hand contractures     Iron overload due to repeated red blood cell transfusions      Migraines      Multiple subsegmental pulmonary emboli without acute cor pulmonale (H) 02/01/2021     Oppositional defiant behavior      Superficial venous thrombosis of arm, right 03/25/2021     Uncomplicated asthma      Past Surgical History:   Procedure Laterality Date     AS INSERT TUNNELED CV 2 CATH W/O PORT/PUMP       CHOLECYSTECTOMY       CV RIGHT HEART CATH MEASUREMENTS RECORDED N/A 11/18/2021    Procedure: Right Heart Cath;  Surgeon: Jackson Stauffer MD;  Location: [facility]  CARDIAC CATH LAB     INSERT PORT VASCULAR ACCESS Left 4/21/2021    Procedure: INSERTION, VASCULAR ACCESS PORT (NOT SURE ON SIDE UNTIL REMOVAL);  Surgeon: Rajan More MD;  Location: [facility] OR     IR CHEST PORT PLACEMENT > 5 YRS OF AGE  4/21/2021     IR CVC NON TUNNEL LINE REMOVAL  5/6/2021     IR CVC NON TUNNEL PLACEMENT  04/07/2020     IR CVC NON TUNNEL PLACEMENT  4/30/2021     IR PORT REMOVAL LEFT  4/21/2021     REMOVE PORT VASCULAR ACCESS Left 4/21/2021    Procedure: REMOVAL, VASCULAR ACCESS PORT LEFT;  Surgeon: Rajan More MD;  Location: [facility] OR     REPAIR TENDON ELBOW Right 10/02/2019    Procedure: Right Elbow Flexor Lengthening, Flexor Pronator Slide Of Wrist and Finger, Thumb Adductor Lengthening;  Surgeon: Anai Franco MD;  [facility]     TONSILLECTOMY Bilateral 10/02/2019    Procedure: Bilateral Tonsillectomy;  Surgeon: Farhana Guy MD;  [facility]     ZZC BREAST REDUCTION (INCLUDES LIPO) TIER 3 Bilateral 04/23/2019     Allergies   Allergen Reactions     Contrast Dye      Hives and breathing issues     Fish-Derived Products Hives     Seafood Hives     Diagnostic X-Ray Materials      Gadolinium      Social History   Social History     Tobacco Use     Smoking status: Never Smoker     Smokeless tobacco: Never Used   Substance Use Topics     Alcohol use: Not Currently     Alcohol/week: 0.0 standard drinks     Drug use: Never      Past medical history and social history were reviewed.    Physical Examination:  There were no vitals taken for this visit.  Wt Readings from Last 10 Encounters:   01/17/22 75.2 kg (165 lb 12.8 oz)   01/12/22 77.9 kg (171 lb 12.8 oz)   01/08/22 77.1 kg (170 lb)   01/02/22 77.1 kg (170 lb)   12/27/21 76.5 kg (168 lb 10.4 oz)   12/25/21 77.1 kg (170 lb)   12/20/21 77.1 kg (170 lb)   12/15/21 77.1 kg (170 lb)   12/12/21 77.1 kg (170 lb)   12/10/21 77.1 kg (170 lb)     Video physical exam  General: Patient appears well in no acute distress.   Skin: No  visualized rash or lesions on visualized skin  Eyes: EOMI, no erythema, sclera icterus or discharge noted  Resp: Appears to be breathing comfortably without accessory muscle usage, speaking in full sentences, no cough  MSK: Appears to have normal range of motion based on visualized movements  Neurologic: No apparent tremors, facial movements symmetric  Psych: affect normal, alert and oriented    The rest of a comprehensive physical examination is deferred due to PHE (public health emergency) video restrictions    Laboratory Data:  Results for HIMANSHU AL (MRN [id number]) as of 1/21/2022 15:42   1/17/2022 11:17   Sodium 143   Potassium 3.8   Chloride 113 (H)   Carbon Dioxide 20   Urea Nitrogen 7   Creatinine 0.45 (L)   GFR Estimate >90   Calcium 8.8   Anion Gap 10   Albumin 3.9   Protein Total 7.8   Bilirubin Total 2.2 (H)   Alkaline Phosphatase 61   ALT 44   AST 46 (H)   Glucose 104 (H)   WBC 8.2   Hemoglobin 6.7 (LL)   Hematocrit 20.2 (L)   Platelet Count 391   RBC Count 2.25 (L)   MCV 90   MCH 29.8   MCHC 33.2   RDW 21.2 (H)   % Neutrophils 74   % Lymphocytes 14   % Monocytes 9   % Eosinophils 2   % Basophils 0   % Myelocytes 1   Absolute Basophils 0.0   NRBC/W 172 (H)   Absolute Neutrophil 6.1   Absolute Lymphocytes 1.1   Absolute Monocytes 0.7   Absolute Eosinophils 0.2   Absolute Myelocytes 0.1 (H)   Absolute NRBCs 14.1 (H)   RBC Morphology Confirmed RBC Indices   Platelet Morphology Automated Count Confirmed. Platelet morphology is normal.   Polychromasia Slight (A)   Sickle Cells Marked (A)   Target Cells Moderate (A)   INR 1.24 (H)       Assessment and Plan:  1. Sickle Cell HgbSS Disease  2. Chronic Pain  Himanshu has been doing well in staying out of the ED lately, however in turn she has been in the infusion center almost daily (10 infusions and 4 ED visits since Jan 1). We discussed that the infusion center and ED should be reserved for an acute pain crisis and not be used to manage chronic pain. In her  case it has been difficult to separate the two. Myself and Dr. Duncan discussed prior to visit and relayed to patient our concerns about her frequent need for IV narcotics in addition to her home opioids she takes daily as this is a poor approach to chronic pain.     We discussed the following plan to reduce opioid use for her sickle cell disease and chronic pain:  -Increase Hydrea to 3000mg daily to help lessen frequency of sickle cell pain  -Continue MS Contin and Oxycodone at home  -Limit infusion center visits to two times per week- outside of that should try to manage at home with current medications, heat, rest, compression, warm baths. Did update triage nurses on this  -If unable to manage at home can go to ED but continue to not do IV narcotics in the emergency room. Did add Ketamine to pain plan in ED  -Recommend we try Suboxone in setting of opioid tolerance/poorly controlled pain with chronic opioids. She was not interested at this time but we would continue to recommend this moving forward   -She would like to seek care with different hematology team. Discussed we support her in that as long as she is getting care. Offered to help with this transition but she declined     Continue Desferal for iron overload. Continue Warfarin for history of PE, following with anticoag clinic, not discussed.     She did not want to schedule follow-up. I discussed we would continue to care for her whenever she wanted to see us and she can call with any concerns.     45 minutes spent on the date of the encounter doing chart review, review of test results, interpretation of tests, patient visit and documentation, discussion with Dr. Duncan.     Andrei Machado PA-C  Department of Hematology and Oncology  [facility]

## 2022-01-22 NOTE — TELEPHONE ENCOUNTER
Sickle cell and having a lot of pain     Was waiting to get into an infusion     Sharp pain in back and shoots into chest  Started today  Constant pain   Upper back on right side    Rates 9/10  Pain in chest is also a 9/10  Sharp pain is making it harder to breathe but only due to pain    Pain meds, ms contin and oxycodone  Ice and heat   Ibuprofen and tylenol   Hot bath    States she was really trying to avoid the ED. Has tried everything she can think of but the pain is just too severe.    Denies pregnancy  No fever  No injuries  No shortness of breath    Triaged to a disposition of Go to ED. Patient is agreeable.     COVID 19 Nurse Triage Plan/Patient Instructions    Please be aware that novel coronavirus (COVID-19) may be circulating in the community. If you develop symptoms such as fever, cough, or SOB or if you have concerns about the presence of another infection including coronavirus (COVID-19), please contact your health care provider or visit [email or web address].     Disposition/Instructions    ED Visit recommended. Follow protocol based instructions.     Bring Your Own Device:  Please also bring your smart device(s) (smart phones, tablets, laptops) and their charging cables for your personal use and to communicate with your care team during your visit.    Thank you for taking steps to prevent the spread of this virus.  o Limit your contact with others.  o Wear a simple mask to cover your cough.  o Wash your hands well and often.    Resources    [facility]: About COVID-19: [email or web address]    CDC: What to Do If You're Sick: www.cdc.gov/coronavirus/2019-ncov/about/steps-when-sick.html    CDC: Ending Home Isolation: www.cdc.gov/coronavirus/2019-ncov/hcp/disposition-in-home-patients.html     CDC: Caring for Someone: www.cdc.gov/coronavirus/2019-ncov/if-you-are-sick/care-for-someone.html     [facility]: Interim Guidance for Hospital Discharge to Home:  www.health.[facility].[place].us/diseases/coronavirus/hcp/hospdischarge.pdf    [facility] clinical trials (COVID-19 research studies): clinicalaffairs.[facility]/n-clinical-trials     Below are the COVID-19 hotlines at the [place] Department of Health ([facility]). Interpreters are available.   o For health questions: Call 636-235-4222 or 1-440.297.6900 (7 a.m. to 7 p.m.)  o For questions about schools and childcare: Call 780-052-6909 or 1-934.291.1523 (7 a.m. to 7 p.m.)      Reason for Disposition    Chest pain > 5 minutes    [1] SEVERE sickle cell pain (sickle cell crisis) AND [2] has pain management plan AND [3] NOT better after taking pain medicine per plan    Additional Information    Negative: SEVERE difficulty breathing (e.g., struggling for each breath, retractions, cyanosis, speaks in single words, pulse > 120)    Negative: Shock suspected (e.g., cold/pale/clammy skin, too weak to stand, low BP, rapid pulse)    Negative: [1] Chest pain lasts > 5 minutes AND [2] history of heart disease  (i.e., heart attack, bypass surgery, angina, angioplasty, CHF; not just a heart murmur)    Negative: [1] Chest pain that lasts > 5 minutes AND [2] described as crushing, pressure-like, or heavy    Negative: Sounds like a life-threatening emergency to the triager    Negative: Pain that follows an injury    Negative: Pain is not typical of patient's previous SCD acute pain attacks    Negative: [1] SEVERE abdominal pain AND [2] present > 1 hour    Negative: MODERATE difficulty breathing (e.g., speaks in phrases, SOB even at rest, pulse 100-120)    Protocols used: SICKLE CELL DISEASE - ACUTE PAIN EPISODE (CRISIS)-[facility]    Ember Andrade RN on 1/21/2022 at 11:15 PM

## 2022-01-23 ENCOUNTER — HOME INFUSION (PRE-WILLOW HOME INFUSION) (OUTPATIENT)
Dept: PHARMACY | Facility: CLINIC | Age: 22
End: 2022-01-23

## 2022-01-23 ENCOUNTER — HOSPITAL ENCOUNTER (EMERGENCY)
Facility: CLINIC | Age: 22
Discharge: HOME | End: 2022-01-23
Attending: EMERGENCY MEDICINE | Admitting: EMERGENCY MEDICINE
Payer: COMMERCIAL

## 2022-01-23 VITALS
OXYGEN SATURATION: 99 % | HEART RATE: 96 BPM | WEIGHT: 165 LBS | RESPIRATION RATE: 16 BRPM | SYSTOLIC BLOOD PRESSURE: 111 MMHG | DIASTOLIC BLOOD PRESSURE: 60 MMHG | TEMPERATURE: 97.5 F | BODY MASS INDEX: 28.32 KG/M2

## 2022-01-23 DIAGNOSIS — D57.00 SICKLE CELL PAIN CRISIS (H): ICD-10-CM

## 2022-01-23 LAB
ALBUMIN SERPL-MCNC: 4 G/DL (ref 3.4–5)
ALP SERPL-CCNC: 72 U/L (ref 40–150)
ALT SERPL W P-5'-P-CCNC: 33 U/L (ref 0–50)
ANION GAP SERPL CALCULATED.3IONS-SCNC: 7 MMOL/L (ref 3–14)
AST SERPL W P-5'-P-CCNC: 29 U/L (ref 0–45)
BASOPHILS # BLD MANUAL: 0.1 10E3/UL (ref 0–0.2)
BASOPHILS NFR BLD MANUAL: 2 %
BILIRUB SERPL-MCNC: 1.9 MG/DL (ref 0.2–1.3)
BUN SERPL-MCNC: 8 MG/DL (ref 7–30)
CALCIUM SERPL-MCNC: 8.8 MG/DL (ref 8.5–10.1)
CHLORIDE BLD-SCNC: 112 MMOL/L (ref 94–109)
CO2 SERPL-SCNC: 19 MMOL/L (ref 20–32)
CREAT SERPL-MCNC: 0.52 MG/DL (ref 0.52–1.04)
EOSINOPHIL # BLD MANUAL: 0.4 10E3/UL (ref 0–0.7)
EOSINOPHIL NFR BLD MANUAL: 6 %
ERYTHROCYTE [DISTWIDTH] IN BLOOD BY AUTOMATED COUNT: 20.6 % (ref 10–15)
GFR SERPL CREATININE-BSD FRML MDRD: >90 ML/MIN/1.73M2
GLUCOSE BLD-MCNC: 105 MG/DL (ref 70–99)
HCT VFR BLD AUTO: 20.9 % (ref 35–47)
HGB BLD-MCNC: 6.8 G/DL (ref 11.7–15.7)
LYMPHOCYTES # BLD MANUAL: 2.2 10E3/UL (ref 0.8–5.3)
LYMPHOCYTES NFR BLD MANUAL: 31 %
MCH RBC QN AUTO: 28.3 PG (ref 26.5–33)
MCHC RBC AUTO-ENTMCNC: 32.5 G/DL (ref 31.5–36.5)
MCV RBC AUTO: 87 FL (ref 78–100)
MONOCYTES # BLD MANUAL: 0.9 10E3/UL (ref 0–1.3)
MONOCYTES NFR BLD MANUAL: 12 %
NEUTROPHILS # BLD MANUAL: 3.5 10E3/UL (ref 1.6–8.3)
NEUTROPHILS NFR BLD MANUAL: 49 %
NRBC # BLD AUTO: 4.5 10E3/UL
NRBC BLD MANUAL-RTO: 62 %
PLAT MORPH BLD: ABNORMAL
PLATELET # BLD AUTO: 503 10E3/UL (ref 150–450)
POTASSIUM BLD-SCNC: 3.2 MMOL/L (ref 3.4–5.3)
PROT SERPL-MCNC: 8.2 G/DL (ref 6.8–8.8)
RBC # BLD AUTO: 2.4 10E6/UL (ref 3.8–5.2)
RBC MORPH BLD: ABNORMAL
RETICS # AUTO: 0.25 10E6/UL (ref 0.03–0.1)
RETICS/RBC NFR AUTO: 10.4 % (ref 0.5–2)
SICKLE CELLS BLD QL SMEAR: SLIGHT
SODIUM SERPL-SCNC: 138 MMOL/L (ref 133–144)
WBC # BLD AUTO: 7.2 10E3/UL (ref 4–11)

## 2022-01-23 PROCEDURE — 250N000011 HC RX IP 250 OP 636: Performed by: EMERGENCY MEDICINE

## 2022-01-23 PROCEDURE — 250N000009 HC RX 250: Performed by: EMERGENCY MEDICINE

## 2022-01-23 PROCEDURE — 80053 COMPREHEN METABOLIC PANEL: CPT | Performed by: EMERGENCY MEDICINE

## 2022-01-23 PROCEDURE — 99285 EMERGENCY DEPT VISIT HI MDM: CPT | Mod: 25 | Performed by: EMERGENCY MEDICINE

## 2022-01-23 PROCEDURE — 96361 HYDRATE IV INFUSION ADD-ON: CPT | Performed by: EMERGENCY MEDICINE

## 2022-01-23 PROCEDURE — 99284 EMERGENCY DEPT VISIT MOD MDM: CPT | Performed by: EMERGENCY MEDICINE

## 2022-01-23 PROCEDURE — 96375 TX/PRO/DX INJ NEW DRUG ADDON: CPT | Performed by: EMERGENCY MEDICINE

## 2022-01-23 PROCEDURE — 85045 AUTOMATED RETICULOCYTE COUNT: CPT | Performed by: EMERGENCY MEDICINE

## 2022-01-23 PROCEDURE — 258N000003 HC RX IP 258 OP 636: Performed by: EMERGENCY MEDICINE

## 2022-01-23 PROCEDURE — 250N000013 HC RX MED GY IP 250 OP 250 PS 637: Performed by: EMERGENCY MEDICINE

## 2022-01-23 PROCEDURE — 36415 COLL VENOUS BLD VENIPUNCTURE: CPT | Performed by: EMERGENCY MEDICINE

## 2022-01-23 PROCEDURE — 96374 THER/PROPH/DIAG INJ IV PUSH: CPT | Performed by: EMERGENCY MEDICINE

## 2022-01-23 PROCEDURE — 85027 COMPLETE CBC AUTOMATED: CPT | Performed by: EMERGENCY MEDICINE

## 2022-01-23 RX ORDER — OXYCODONE HYDROCHLORIDE 10 MG/1
20 TABLET ORAL ONCE
Status: COMPLETED | OUTPATIENT
Start: 2022-01-23 | End: 2022-01-23

## 2022-01-23 RX ORDER — HEPARIN SODIUM (PORCINE) LOCK FLUSH IV SOLN 100 UNIT/ML 100 UNIT/ML
100 SOLUTION INTRAVENOUS ONCE
Status: DISCONTINUED | OUTPATIENT
Start: 2022-01-23 | End: 2022-01-23 | Stop reason: HOSPADM

## 2022-01-23 RX ORDER — SODIUM CHLORIDE, SODIUM LACTATE, POTASSIUM CHLORIDE, CALCIUM CHLORIDE 600; 310; 30; 20 MG/100ML; MG/100ML; MG/100ML; MG/100ML
1000 INJECTION, SOLUTION INTRAVENOUS CONTINUOUS
Status: DISCONTINUED | OUTPATIENT
Start: 2022-01-23 | End: 2022-01-23 | Stop reason: HOSPADM

## 2022-01-23 RX ORDER — KETOROLAC TROMETHAMINE 15 MG/ML
15 INJECTION, SOLUTION INTRAMUSCULAR; INTRAVENOUS ONCE
Status: COMPLETED | OUTPATIENT
Start: 2022-01-23 | End: 2022-01-23

## 2022-01-23 RX ORDER — ONDANSETRON 2 MG/ML
8 INJECTION INTRAMUSCULAR; INTRAVENOUS ONCE
Status: COMPLETED | OUTPATIENT
Start: 2022-01-23 | End: 2022-01-23

## 2022-01-23 RX ADMIN — KETOROLAC TROMETHAMINE 15 MG: 15 INJECTION, SOLUTION INTRAMUSCULAR; INTRAVENOUS at 02:30

## 2022-01-23 RX ADMIN — Medication 4 MG: at 02:30

## 2022-01-23 RX ADMIN — ONDANSETRON 8 MG: 2 INJECTION INTRAMUSCULAR; INTRAVENOUS at 02:29

## 2022-01-23 RX ADMIN — OXYCODONE HYDROCHLORIDE 20 MG: 10 TABLET ORAL at 02:29

## 2022-01-23 RX ADMIN — SODIUM CHLORIDE, POTASSIUM CHLORIDE, SODIUM LACTATE AND CALCIUM CHLORIDE 1000 ML: 600; 310; 30; 20 INJECTION, SOLUTION INTRAVENOUS at 02:29

## 2022-01-23 NOTE — DISCHARGE INSTRUCTIONS
Please make an appointment to follow up with your hematologist as soon as you can    Return to the emergency department if you have any further concerns.

## 2022-01-23 NOTE — ED PROVIDER NOTES
[place] EMERGENCY DEPARTMENT (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  Patient reports back pain in the low back.  This is similar to multiple prior presentations for sickle cell pain.  No radiation down the legs.  No incontinence or retention of bowel or bladder.  No numbness, tingling, weakness of her legs or between her legs.  Denies fall.  No fevers.  No chest pain, shortness of breath, Escobar pain, nausea, vomiting.  She tried her home oral meds without any relief.    Past Medical History  Past Medical History:   Diagnosis Date     Anxiety      Bleeding disorder (H)      Blood clotting disorder (H)      Cerebral infarction (H) 2015     Cognitive developmental delay     low IQ. Please recognize when managing pain and planning with her     Depressive disorder      Hemiplegia and hemiparesis following cerebral infarction affecting right dominant side (H)     right hand contractures     Iron overload due to repeated red blood cell transfusions      Migraines      Multiple subsegmental pulmonary emboli without acute cor pulmonale (H) 02/01/2021     Oppositional defiant behavior      Superficial venous thrombosis of arm, right 03/25/2021     Uncomplicated asthma      Past Surgical History:   Procedure Laterality Date     AS INSERT TUNNELED CV 2 CATH W/O PORT/PUMP       CHOLECYSTECTOMY       CV RIGHT HEART CATH MEASUREMENTS RECORDED N/A 11/18/2021    Procedure: Right Heart Cath;  Surgeon: Jackson Stauffer MD;  Location:  HEART CARDIAC CATH LAB     INSERT PORT VASCULAR ACCESS Left 4/21/2021    Procedure: INSERTION, VASCULAR ACCESS PORT (NOT SURE ON SIDE UNTIL REMOVAL);  Surgeon: Rajan More,  MD;  Location: [facility] OR     IR CHEST PORT PLACEMENT > 5 YRS OF AGE  4/21/2021     IR CVC NON TUNNEL LINE REMOVAL  5/6/2021     IR CVC NON TUNNEL PLACEMENT  04/07/2020     IR CVC NON TUNNEL PLACEMENT  4/30/2021     IR PORT REMOVAL LEFT  4/21/2021     REMOVE PORT VASCULAR ACCESS Left 4/21/2021    Procedure: REMOVAL, VASCULAR ACCESS PORT LEFT;  Surgeon: Rajan More MD;  Location: [facility] OR     REPAIR TENDON ELBOW Right 10/02/2019    Procedure: Right Elbow Flexor Lengthening, Flexor Pronator Slide Of Wrist and Finger, Thumb Adductor Lengthening;  Surgeon: Anai Franco MD;  [facility]     TONSILLECTOMY Bilateral 10/02/2019    Procedure: Bilateral Tonsillectomy;  Surgeon: Farhana Guy MD;  [facility]     ZZC BREAST REDUCTION (INCLUDES LIPO) TIER 3 Bilateral 04/23/2019     acetaminophen (TYLENOL) 325 MG tablet  albuterol (PROAIR HFA/PROVENTIL HFA/VENTOLIN HFA) 108 (90 Base) MCG/ACT inhaler  albuterol (PROVENTIL) (2.5 MG/3ML) 0.083% neb solution  budesonide-formoterol (SYMBICORT) 160-4.5 MCG/ACT Inhaler  deferasirox (JADENU) 360 MG tablet  diphenhydrAMINE (BENADRYL) 25 MG capsule  EPINEPHrine (ANY BX GENERIC EQUIV) 0.3 MG/0.3ML injection 2-pack  Hydroxyurea 1000 MG TABS  lidocaine-prilocaine (EMLA) 2.5-2.5 % external cream  medroxyPROGESTERone (DEPO-PROVERA) 150 MG/ML IM injection  morphine (MS CONTIN) 15 MG CR tablet  naloxone (NARCAN) 4 MG/0.1ML nasal spray  ondansetron (ZOFRAN) 8 MG tablet  oxyCODONE IR (ROXICODONE) 15 MG tablet  warfarin ANTICOAGULANT (COUMADIN) 5 MG tablet  warfarin ANTICOAGULANT (COUMADIN) 5 MG tablet  warfarin ANTICOAGULANT (COUMADIN) 7.5 MG tablet      Allergies   Allergen Reactions     Contrast Dye      Hives and breathing issues     Fish-Derived Products Hives     Seafood Hives     Diagnostic X-Ray Materials      Gadolinium      Family History  Family History   Problem Relation Age of Onset     Sickle Cell Trait Mother      Hypertension Mother      Asthma Mother       Sickle Cell Trait Father      Social History   Social History     Tobacco Use     Smoking status: Never Smoker     Smokeless tobacco: Never Used   Substance Use Topics     Alcohol use: Not Currently     Alcohol/week: 0.0 standard drinks     Drug use: Never      Past medical history, past surgical history, medications, allergies, family history, and social history were reviewed with the patient. No additional pertinent items.       Review of Systems   ROS: 14 point ROS neg other than the symptoms noted above in the HPI.  A complete review of systems was performed with pertinent positives and negatives noted in the HPI, and all other systems negative.    Physical Exam   Weight: 74.8 kg (165 lb)  Physical Exam  Physical Exam   Constitutional: oriented to person, place, and time. appears well-developed and well-nourished.   HENT:   Head: Normocephalic and atraumatic.   Neck: Normal range of motion.   Pulmonary/Chest: Effort normal. No respiratory distress.   Cardiac: No murmurs, rubs, gallops. RRR.  Abdominal: Abdomen soft, nontender, nondistended. No rebound tenderness.  MSK: Long bones without deformity or evidence of trauma.  No midline tenderness over the thoracic or lumbar spine.  Paraspinous muscle tenderness bilaterally.  Neurological: alert and oriented to person, place, and time.   Skin: Skin is warm and dry.   Psychiatric:  normal mood and affect.  behavior is normal. Thought content normal.     ED Course      Procedures            No results found for any visits on 01/23/22.  Medications - No data to display     Assessments & Plan (with Medical Decision Making)   MDM  Patient presented with back pain.  This is similar to back pain with her prior sickle cell crises.  She has no falls, fevers, numbness, tingling, weakness of her extremities, urinary retention or incontinence, bowel retention or incontinence, very unlikely cauda equina, epidural abscess or other acute spinous process.  No symptoms or signs  of acute chest.  Labs otherwise stable.  She was given her regimen for pain crisis which resolved her symptoms and the patient slept here for a few hours.  Patient be discharged and recommended following up at infusion center or with her hematology team.    I have reviewed the nursing notes. I have reviewed the findings, diagnosis, plan and need for follow up with the patient.    New Prescriptions    No medications on file       Final diagnoses:   Sickle cell pain crisis (H)       --  Andrew Chou MD  [facility] EMERGENCY DEPARTMENT  1/23/2022     Andrew Chou MD  01/23/22 0425

## 2022-01-24 ENCOUNTER — TELEPHONE (OUTPATIENT)
Dept: ONCOLOGY | Facility: CLINIC | Age: 22
End: 2022-01-24
Payer: COMMERCIAL

## 2022-01-24 ENCOUNTER — TELEPHONE (OUTPATIENT)
Dept: ANTICOAGULATION | Facility: CLINIC | Age: 22
End: 2022-01-24
Payer: COMMERCIAL

## 2022-01-24 ENCOUNTER — INFUSION THERAPY VISIT (OUTPATIENT)
Dept: INFUSION THERAPY | Facility: CLINIC | Age: 22
End: 2022-01-24
Attending: PEDIATRICS
Payer: COMMERCIAL

## 2022-01-24 ENCOUNTER — PATIENT OUTREACH (OUTPATIENT)
Dept: CARE COORDINATION | Facility: CLINIC | Age: 22
End: 2022-01-24
Payer: COMMERCIAL

## 2022-01-24 ENCOUNTER — ANTICOAGULATION THERAPY VISIT (OUTPATIENT)
Dept: ANTICOAGULATION | Facility: CLINIC | Age: 22
End: 2022-01-24

## 2022-01-24 VITALS
DIASTOLIC BLOOD PRESSURE: 69 MMHG | HEART RATE: 112 BPM | RESPIRATION RATE: 16 BRPM | SYSTOLIC BLOOD PRESSURE: 125 MMHG | OXYGEN SATURATION: 94 % | TEMPERATURE: 98.5 F

## 2022-01-24 DIAGNOSIS — D57.00 SICKLE CELL PAIN CRISIS (H): ICD-10-CM

## 2022-01-24 DIAGNOSIS — G81.10 SPASTIC HEMIPLEGIA, UNSPECIFIED ETIOLOGY, UNSPECIFIED LATERALITY (H): Primary | ICD-10-CM

## 2022-01-24 DIAGNOSIS — I27.82 CHRONIC PULMONARY EMBOLISM WITHOUT ACUTE COR PULMONALE, UNSPECIFIED PULMONARY EMBOLISM TYPE (H): Primary | ICD-10-CM

## 2022-01-24 DIAGNOSIS — Z11.59 ENCOUNTER FOR SCREENING FOR OTHER VIRAL DISEASES: Primary | ICD-10-CM

## 2022-01-24 DIAGNOSIS — I27.82 CHRONIC PULMONARY EMBOLISM WITHOUT ACUTE COR PULMONALE, UNSPECIFIED PULMONARY EMBOLISM TYPE (H): ICD-10-CM

## 2022-01-24 LAB — INR PPP: 1.26 (ref 0.85–1.15)

## 2022-01-24 PROCEDURE — 96365 THER/PROPH/DIAG IV INF INIT: CPT

## 2022-01-24 PROCEDURE — 36591 DRAW BLOOD OFF VENOUS DEVICE: CPT

## 2022-01-24 PROCEDURE — 85610 PROTHROMBIN TIME: CPT

## 2022-01-24 PROCEDURE — 96375 TX/PRO/DX INJ NEW DRUG ADDON: CPT

## 2022-01-24 PROCEDURE — 96376 TX/PRO/DX INJ SAME DRUG ADON: CPT

## 2022-01-24 PROCEDURE — 258N000003 HC RX IP 258 OP 636: Performed by: PEDIATRICS

## 2022-01-24 PROCEDURE — 250N000011 HC RX IP 250 OP 636: Performed by: PEDIATRICS

## 2022-01-24 PROCEDURE — 96366 THER/PROPH/DIAG IV INF ADDON: CPT

## 2022-01-24 RX ORDER — ONDANSETRON 8 MG/1
8 TABLET, FILM COATED ORAL
Status: CANCELLED
Start: 2022-04-01

## 2022-01-24 RX ORDER — HEPARIN SODIUM,PORCINE 10 UNIT/ML
5 VIAL (ML) INTRAVENOUS
Status: CANCELLED | OUTPATIENT
Start: 2022-04-01

## 2022-01-24 RX ORDER — HEPARIN SODIUM (PORCINE) LOCK FLUSH IV SOLN 100 UNIT/ML 100 UNIT/ML
5 SOLUTION INTRAVENOUS
Status: CANCELLED | OUTPATIENT
Start: 2022-04-01

## 2022-01-24 RX ORDER — OXYCODONE HYDROCHLORIDE 15 MG/1
15 TABLET ORAL EVERY 4 HOURS PRN
Qty: 45 TABLET | Refills: 0 | Status: SHIPPED | OUTPATIENT
Start: 2022-01-24 | End: 2022-02-03

## 2022-01-24 RX ORDER — MORPHINE SULFATE 2 MG/ML
2 INJECTION, SOLUTION INTRAMUSCULAR; INTRAVENOUS
Status: DISCONTINUED | OUTPATIENT
Start: 2022-01-24 | End: 2022-01-24 | Stop reason: HOSPADM

## 2022-01-24 RX ORDER — DIPHENHYDRAMINE HCL 25 MG
25 CAPSULE ORAL
Status: CANCELLED
Start: 2022-04-01

## 2022-01-24 RX ORDER — HEPARIN SODIUM (PORCINE) LOCK FLUSH IV SOLN 100 UNIT/ML 100 UNIT/ML
5 SOLUTION INTRAVENOUS
Status: DISCONTINUED | OUTPATIENT
Start: 2022-01-24 | End: 2022-01-24 | Stop reason: HOSPADM

## 2022-01-24 RX ORDER — MORPHINE SULFATE 2 MG/ML
2 INJECTION, SOLUTION INTRAMUSCULAR; INTRAVENOUS
Status: CANCELLED
Start: 2022-04-01

## 2022-01-24 RX ADMIN — MORPHINE SULFATE 2 MG: 2 INJECTION, SOLUTION INTRAMUSCULAR; INTRAVENOUS at 13:19

## 2022-01-24 RX ADMIN — MORPHINE SULFATE 2 MG: 2 INJECTION, SOLUTION INTRAMUSCULAR; INTRAVENOUS at 11:16

## 2022-01-24 RX ADMIN — DEXTROSE AND SODIUM CHLORIDE 1000 ML: 5; 450 INJECTION, SOLUTION INTRAVENOUS at 11:11

## 2022-01-24 RX ADMIN — MORPHINE SULFATE 2 MG: 2 INJECTION, SOLUTION INTRAMUSCULAR; INTRAVENOUS at 12:26

## 2022-01-24 RX ADMIN — Medication 5 ML: at 14:18

## 2022-01-24 NOTE — PATIENT INSTRUCTIONS
Dear Jennifer Cervantes    Thank you for choosing [facility] ([facility]) for your infusion.  The following information is a summary of our appointment as well as important reminders.      We look forward in seeing you on your next appointment here at [facility] ([facility]).  Please don t hesitate to call us at [phone] to reschedule any of your appointments or to speak with one of the [facility] registered nurses.  It was a pleasure taking care of you today.    Sincerely,    [facility]  [facility]  [address] [place] [address]  [address]  Phone:  [phone]

## 2022-01-24 NOTE — PROGRESS NOTES
This is a recent snapshot of the patient's [facility] medical record.  For current drug dose and complete information and questions, call [phone] or In Basket pool, fv home infusion ([id number])  CSN Number:  [id number]

## 2022-01-24 NOTE — CONFIDENTIAL NOTE
Narcotic Refill Request    Medication(s) requested:  Oxycodone 10 mg   Person Requesting Refill:Jennifer   What pain is the medication treating: Sickle cell pain   How is the medication being taken?:takes 1 tablet every 4 hours  Does pt have enough for today? Just enough for today    Is pain being adequately controlled on the current regimen?: yes   Experiencing any side effects from medication?: No     Date of most recent appointment:  1/21/22 Andrei HIGGINS  Any No Show Visits:No   Next appointment:   3/21/21 Dr Duncan   Last fill date and by whom:  1/17/22 Dr Duncan    Reviewed: No Access     Routed provider: Andrei HIGGINS

## 2022-01-24 NOTE — TELEPHONE ENCOUNTER
ANTICOAGULATION  MANAGEMENT: Discharge Review    Jennifer Cervantes chart reviewed for anticoagulation continuity of care    Emergency room visit on 1/23/22 for sickle cell pain crisis.    Discharge disposition: Home    Results:    Recent labs: (last 7 days)     01/17/22  1117   INR 1.24*     Anticoagulation inpatient management:     not applicable     Anticoagulation discharge instructions:     Warfarin dosing: home regimen continued   Bridging: No   INR goal change: No      Medication changes affecting anticoagulation: No    Additional factors affecting anticoagulation: No    Plan     Recommend to check INR on 1/24/22     Spoke with Jennifer while she was with the nurse at her infusion therapy visit. Nurse verbalized that she would check INR before Jennifer left the appointment.    No adjustment to Anticoagulation Calendar was required    Daron Clark, Student Pharmacist

## 2022-01-24 NOTE — PROGRESS NOTES
Social Work Note: Telephone Call  Oncology Clinic     Data/Intervention:  Patient Name:  Jennifer Cervantes  /Age: 2000, 21 years old      Call From: [facility]         Reason for Call:  Transportation     Assessment:   called [employer] ([phone]) to arrange ride. [employer] arranged  for patient from home with a will call return ride.  Patient will need to call when ready for return ride home.      Plan:  Patient is aware of the transportation plan.  available to assist with any other needs.      CARLOS Chavez,[facility]  Hematology/Oncology Social Worker  Phone:[phone] Pager: [phone]

## 2022-01-24 NOTE — TELEPHONE ENCOUNTER
Jennifer called about status of refill and if any IVF/Pain appts open yet.    As of 0945 no appts available and Oxycodone is pending refill status.

## 2022-01-24 NOTE — PROGRESS NOTES
ANTICOAGULATION MANAGEMENT     Jennifer Cervantes 21 year old female is on warfarin with subtherapeutic INR result. (Goal INR 2.0-3.0)    Recent labs: (last 7 days)     01/24/22  1056   INR 1.26*       ASSESSMENT     Source(s): Chart Review and Patient/Caregiver Call       Warfarin doses taken: Warfarin taken as instructed    Diet: No new diet changes identified    New illness, injury, or hospitalization: Yes: ED visit yesterday 1/23/22 for sickle cell pain crisis    Medication/supplement changes: oxycodone dose changes    Signs or symptoms of bleeding or clotting: No    Previous INR: Subtherapeutic    Additional findings: None     PLAN     Recommended plan for ongoing change(s) affecting INR     Dosing Instructions: Booster dose then Increase your warfarin dose (14.3% change) with next INR in 1 week       Summary  As of 1/24/2022    Full warfarin instructions:  1/24: 30 mg; Otherwise 30 mg every Wed, Sat; 20 mg all other days   Next INR check:  1/31/2022             Telephone call with Jennifer who verbalizes understanding and agrees to plan and who agrees to plan and repeated back plan correctly    Lab visit scheduled    Education provided: Goal range and significance of current result, Importance of following up at instructed interval, Importance of taking warfarin as instructed, Monitoring for clotting signs and symptoms and Contact [phone] with any changes, questions or concerns.     Plan made with [facility] Pharmacist Tati MORENO RN  Anticoagulation Clinic  1/24/2022    _______________________________________________________________________     Anticoagulation Episode Summary     Current INR goal:  2.0-3.0   TTR:  0.0 % (1.8 mo)   Target end date:  Indefinite   Send INR reminders to:  [facility]    Indications    Chronic pulmonary embolism without acute cor pulmonale  unspecified pulmonary embolism type (H) [I27.82]           Comments:  [facility] center [phone]  [facility] [phone]          Anticoagulation Care Providers     Provider Role Specialty Phone number    Eric Duncan MD Referring Pediatric Hematology-Oncology [phone]

## 2022-01-24 NOTE — CONFIDENTIAL NOTE
Pt called in to triage requesting IVF pain meds for lower back pain rated 8/10 x few hours this morning. Stated last took prn oxycodone and MS contin  this morning without relief. Denied any fevers, chills, cough, sob, chest pain, numbness or tingling or other symptoms not typical of sickle cell pain.   Pt's last infusion was 1/20/22, last clinic visit 1/21/22 with follow-up on 3/21/21 with Dr Duncan.   Patient states they do not  have own ride and it will take 60 minutes long to get to cancer clinic.   If you do not hear from the infusion center by 2pm then you will not be able to get in for an infusion today.   Please note, if you are late for your appt, you risk losing your infusion appt as it may delay another patient's infusion who arrived on time.     7:55 paged Andrei HIGGINS    11:00 am [facility] appointment is available and Jennifer Confirms that she can make it to that appointment     Social work has set up ride and Message sent to  to schedule.

## 2022-01-24 NOTE — LETTER
1/24/2022         RE: Jennifer Cervantes  [address]  [place] [facility] [address]        Dear Colleague,    Thank you for referring your patient, Jennifer Cervantes, to the [facility]AT[facility]. Please see a copy of my visit note below.    Nursing Note  Jennifer Cervantes presents [facility] for:   Chief Complaint   Patient presents with     Infusion     During today's [facility] appointment, orders from Andrei HIGGINS were completed.  Frequency: once    Progress note:  Patient identification verified by name and date of birth.  Assessment completed.  Vitals recorded in Doc Flowsheets.  Patient was provided with education regarding medication/procedure and possible side effects.  Patient verbalized understanding.     present during visit today: Not Applicable.    Treatment Conditions: Patient denies fever, chills, signs of infection, recent illness, antibiotics use, productive cough or elevated temperature.    Premedications: were not ordered.    Drug Waste Record: No    Infusion length and rate:  infusion given over approximately 2 hours    Labs: were drawn per orders.     Vascular access: port accessed today.    Is the next appt scheduled? no  Asymptomatic COVID test completed? no    Post Infusion Assessment:  Patient tolerated infusion without incident.  Site patent and intact, free from redness, edema or discomfort.  No evidence of extravasations.  Access discontinued per protocol.     Discharge Plan:   Patient has a transportation ride home.  Follow up plan of care with: primary care provider,  Discharge instructions were reviewed with patient.  Patient/representative verbalized understanding of discharge instructions and all questions answered.  Patient discharged [facility] in stable condition.    Melissa Clark RN    Administrations This Visit     dextrose 5% and 0.45%  NaCl BOLUS     Admin Date  01/24/2022 Action  New Bag Dose  1,000 mL Rate  500 mL/hr Route  Intravenous Administered By  Melissa Clark RN          heparin 100 UNIT/ML injection 5 mL     Admin Date  01/24/2022 Action  Given Dose  5 mL Route  Intracatheter Administered By  Melissa Clark RN          morphine (PF) injection 2 mg     Admin Date  01/24/2022 Action  Given Dose  2 mg Route  Intravenous Administered By  Melissa Clark RN           Admin Date  01/24/2022 Action  Given Dose  2 mg Route  Intravenous Administered By  Melissa Clark RN           Admin Date  01/24/2022 Action  Given Dose  2 mg Route  Intravenous Administered By  Melissa Clark RN                /69 (BP Location: Right arm, Patient Position: Sitting)   Pulse 112   Temp 98.5  F (36.9  C) (Oral)   Resp 16   SpO2 94%           Again, thank you for allowing me to participate in the care of your patient.        Sincerely,        [facility]

## 2022-01-24 NOTE — LETTER
Date:January 26, 2022      Provider requested that no letter be sent. Do not send.       [facility]

## 2022-01-24 NOTE — PROGRESS NOTES
Nursing Note  Jennifer Cervantes presents [facility] for:   Chief Complaint   Patient presents with     Infusion     During [facility]'s [facility] appointment, orders from Andrei HIGGINS were completed.  Frequency: once    Progress note:  Patient identification verified by name and date of birth.  Assessment completed.  Vitals recorded in Doc Flowsheets.  Patient was provided with education regarding medication/procedure and possible side effects.  Patient verbalized understanding.     present during visit today: Not Applicable.    Treatment Conditions: Patient denies fever, chills, signs of infection, recent illness, antibiotics use, productive cough or elevated temperature.    Premedications: were not ordered.    Drug Waste Record: No    Infusion length and rate:  infusion given over approximately 2 hours    Labs: were drawn per orders.     Vascular access: port accessed today.    Is the next appt scheduled? no  Asymptomatic COVID test completed? no    Post Infusion Assessment:  Patient tolerated infusion without incident.  Site patent and intact, free from redness, edema or discomfort.  No evidence of extravasations.  Access discontinued per protocol.     Discharge Plan:   Patient has a transportation ride home.  Follow up plan of care with: primary care provider,  Discharge instructions were reviewed with patient.  Patient/representative verbalized understanding of discharge instructions and all questions answered.  [facility] discharged [facility] in stable condition.    Melissa Clark RN    Administrations This Visit     dextrose 5% and 0.45% NaCl BOLUS     Admin Date  01/24/2022 Action  New Bag Dose  1,000 mL Rate  500 mL/hr Route  Intravenous Administered By  Melissa Clark RN          heparin 100 UNIT/ML injection 5 mL     Admin Date  01/24/2022 Action  Given Dose  5 mL Route  Intracatheter Administered  By  Melissa Clark RN          morphine (PF) injection 2 mg     Admin Date  01/24/2022 Action  Given Dose  2 mg Route  Intravenous Administered By  Melissa Clark RN           Admin Date  01/24/2022 Action  Given Dose  2 mg Route  Intravenous Administered By  Melissa Clark RN           Admin Date  01/24/2022 Action  Given Dose  2 mg Route  Intravenous Administered By  Melissa Clark RN                /69 (BP Location: Right arm, Patient Position: Sitting)   Pulse 112   Temp 98.5  F (36.9  C) (Oral)   Resp 16   SpO2 94%

## 2022-01-25 ENCOUNTER — INFUSION THERAPY VISIT (OUTPATIENT)
Dept: INFUSION THERAPY | Facility: CLINIC | Age: 22
End: 2022-01-25
Attending: PEDIATRICS
Payer: COMMERCIAL

## 2022-01-25 ENCOUNTER — PATIENT OUTREACH (OUTPATIENT)
Dept: CARE COORDINATION | Facility: CLINIC | Age: 22
End: 2022-01-25
Payer: COMMERCIAL

## 2022-01-25 ENCOUNTER — HOSPITAL ENCOUNTER (OUTPATIENT)
Dept: MRI IMAGING | Facility: CLINIC | Age: 22
End: 2022-01-25
Attending: PEDIATRICS
Payer: COMMERCIAL

## 2022-01-25 ENCOUNTER — TELEPHONE (OUTPATIENT)
Dept: ONCOLOGY | Facility: CLINIC | Age: 22
End: 2022-01-25
Payer: COMMERCIAL

## 2022-01-25 VITALS
SYSTOLIC BLOOD PRESSURE: 148 MMHG | RESPIRATION RATE: 16 BRPM | TEMPERATURE: 98 F | DIASTOLIC BLOOD PRESSURE: 81 MMHG | HEART RATE: 103 BPM | OXYGEN SATURATION: 99 %

## 2022-01-25 DIAGNOSIS — G81.10 SPASTIC HEMIPLEGIA, UNSPECIFIED ETIOLOGY, UNSPECIFIED LATERALITY (H): Primary | ICD-10-CM

## 2022-01-25 DIAGNOSIS — D57.00 SICKLE CELL PAIN CRISIS (H): ICD-10-CM

## 2022-01-25 DIAGNOSIS — I27.82 CHRONIC PULMONARY EMBOLISM WITHOUT ACUTE COR PULMONALE, UNSPECIFIED PULMONARY EMBOLISM TYPE (H): ICD-10-CM

## 2022-01-25 DIAGNOSIS — E83.111 IRON OVERLOAD DUE TO REPEATED RED BLOOD CELL TRANSFUSIONS: ICD-10-CM

## 2022-01-25 PROCEDURE — 96361 HYDRATE IV INFUSION ADD-ON: CPT

## 2022-01-25 PROCEDURE — 258N000003 HC RX IP 258 OP 636: Performed by: PEDIATRICS

## 2022-01-25 PROCEDURE — 250N000011 HC RX IP 250 OP 636: Performed by: PEDIATRICS

## 2022-01-25 PROCEDURE — 96374 THER/PROPH/DIAG INJ IV PUSH: CPT

## 2022-01-25 PROCEDURE — 96376 TX/PRO/DX INJ SAME DRUG ADON: CPT

## 2022-01-25 PROCEDURE — 74181 MRI ABDOMEN W/O CONTRAST: CPT

## 2022-01-25 PROCEDURE — 74181 MRI ABDOMEN W/O CONTRAST: CPT | Mod: 26 | Performed by: RADIOLOGY

## 2022-01-25 RX ORDER — DIPHENHYDRAMINE HCL 25 MG
25 CAPSULE ORAL
Status: DISCONTINUED | OUTPATIENT
Start: 2022-01-25 | End: 2022-01-25 | Stop reason: HOSPADM

## 2022-01-25 RX ORDER — DIPHENHYDRAMINE HCL 25 MG
25 CAPSULE ORAL
Status: CANCELLED
Start: 2022-04-01

## 2022-01-25 RX ORDER — MORPHINE SULFATE 2 MG/ML
2 INJECTION, SOLUTION INTRAMUSCULAR; INTRAVENOUS
Status: CANCELLED
Start: 2022-04-01

## 2022-01-25 RX ORDER — MORPHINE SULFATE 2 MG/ML
2 INJECTION, SOLUTION INTRAMUSCULAR; INTRAVENOUS
Status: DISCONTINUED | OUTPATIENT
Start: 2022-01-25 | End: 2022-01-25 | Stop reason: HOSPADM

## 2022-01-25 RX ORDER — HEPARIN SODIUM (PORCINE) LOCK FLUSH IV SOLN 100 UNIT/ML 100 UNIT/ML
5 SOLUTION INTRAVENOUS
Status: CANCELLED | OUTPATIENT
Start: 2022-04-01

## 2022-01-25 RX ORDER — WARFARIN SODIUM 5 MG/1
TABLET ORAL
Qty: 190 TABLET | Refills: 3 | Status: ON HOLD | OUTPATIENT
Start: 2022-01-25 | End: 2022-01-31

## 2022-01-25 RX ORDER — ONDANSETRON 8 MG/1
8 TABLET, ORALLY DISINTEGRATING ORAL
Status: DISCONTINUED | OUTPATIENT
Start: 2022-01-25 | End: 2022-01-25 | Stop reason: HOSPADM

## 2022-01-25 RX ORDER — HEPARIN SODIUM,PORCINE 10 UNIT/ML
5 VIAL (ML) INTRAVENOUS
Status: CANCELLED | OUTPATIENT
Start: 2022-04-01

## 2022-01-25 RX ORDER — HEPARIN SODIUM (PORCINE) LOCK FLUSH IV SOLN 100 UNIT/ML 100 UNIT/ML
5 SOLUTION INTRAVENOUS
Status: DISCONTINUED | OUTPATIENT
Start: 2022-01-25 | End: 2022-01-25 | Stop reason: HOSPADM

## 2022-01-25 RX ORDER — ONDANSETRON 8 MG/1
8 TABLET, FILM COATED ORAL
Status: CANCELLED
Start: 2022-04-01

## 2022-01-25 RX ADMIN — MORPHINE SULFATE 2 MG: 2 INJECTION, SOLUTION INTRAMUSCULAR; INTRAVENOUS at 14:02

## 2022-01-25 RX ADMIN — MORPHINE SULFATE 2 MG: 2 INJECTION, SOLUTION INTRAMUSCULAR; INTRAVENOUS at 15:03

## 2022-01-25 RX ADMIN — Medication 5 ML: at 15:22

## 2022-01-25 RX ADMIN — MORPHINE SULFATE 2 MG: 2 INJECTION, SOLUTION INTRAMUSCULAR; INTRAVENOUS at 13:05

## 2022-01-25 RX ADMIN — DEXTROSE AND SODIUM CHLORIDE 1000 ML: 5; 450 INJECTION, SOLUTION INTRAVENOUS at 13:04

## 2022-01-25 NOTE — PROGRESS NOTES
Nursing Note  Jennifer Cervantes presents [facility] for:   Chief Complaint   Patient presents with     Infusion     fluids/ pain meds     During [facility]'s [facility] appointment, orders from  were completed.  Frequency: as needed    Progress note:  Patient identification verified by name and date of birth.  Assessment completed.  Vitals recorded in Doc Flowsheets.  Patient was provided with education regarding medication/procedure and possible side effects.  Patient verbalized understanding.     present during visit today: Not Applicable.    Treatment Conditions: Morphine administered Q1H x 3 per orders. Pt declined antiemetic and diphenhydramine today.     Premedications: were administered.     Drug Waste Record: No    Infusion length and rate:  infusion given over approximately 2 hours    Labs: were drawn per orders.     Vascular access: port accessed today.    Is the next appt scheduled? PRN  Asymptomatic COVID test completed? no    Post Infusion Assessment:  Patient tolerated infusion without incident.     Discharge Plan:   Follow up plan of care with: ongoing infusions at [facility]. and primary care provider,  Discharge instructions were reviewed with patient.  Patient/representative verbalized understanding of discharge instructions and all questions answered.  Patient discharged [facility] in stable condition.    Lis Cabrera RN    Administrations This Visit     dextrose 5% and 0.45% NaCl BOLUS     Admin Date  01/25/2022 Action  New Bag Dose  1,000 mL Rate  500 mL/hr Route  Intravenous Administered By  Lis Cabrera RN          morphine (PF) injection 2 mg     Admin Date  01/25/2022 Action  Given Dose  2 mg Route  Intravenous Administered By  Lis Cabrera RN           Admin Date  01/25/2022 Action  Given Dose  2 mg Route  Intravenous  Administered By  Lis Cabrera RN           Admin Date  01/25/2022 Action  Given Dose  2 mg Route  Intravenous Administered By  Lis Cabrera RN                /73 (BP Location: Left arm, Patient Position: Semi-Short's)   Pulse 103   Temp 98  F (36.7  C) (Oral)   Resp 16   SpO2 99%

## 2022-01-25 NOTE — CONFIDENTIAL NOTE
Pt called in to triage requesting IVF pain meds for lower back pain rated 8/10 x just started a few hours ago.   Stated last took prn Oxycodone, and MS Contin  this morning without relief. Denied any fevers, chills, cough, sob, chest pain, numbness or tingling or other symptoms not typical of sickle cell pain.   Pt's last infusion was 1/25/22, last clinic visit 1/21/22 with follow-up on 3/21/21 with Dr Duncan.   Patient states they do not have own ride and it will take 60minutes long to get to cancer clinic.   Pt denied being out of home medications and needing any refills today.   Pt qualifies for sickle cell standing order protocol.  If you do not hear from the infusion center by 2pm then you will not be able to get in for an infusion today.   Please note, if you are late for your appt, you risk losing your infusion appt as it may delay another patient's infusion who arrived on time.     1:00 pm infusion spot available with [facility] she will take that opening     Message sent to  to schedule appointment   Message sent to social work to assist in setting up ride

## 2022-01-25 NOTE — PROGRESS NOTES
Social Work Note: Telephone Call  Oncology Clinic     Data/Intervention:  Patient Name:  Jennifer Cervantes  /Age: 2000, 21 years old     Call From: [facility]        Reason for Call:  Transportation      Assessment:   called [employer] ([phone]) to arrange ride through patient's insurance. [employer] Ride arranged  for patient from home with [employer] ([phone]).  Patient will need to call when ready for return ride home ([phone]).      Plan:  Patient is aware of the transportation plan.  available to assist with any other needs.      CARLOS Chavez,[facility]  Hematology/Oncology Social Worker  Phone:[phone] Pager: [phone]

## 2022-01-25 NOTE — LETTER
1/25/2022         RE: Jennifer Cervantes  [address]  [place] [address]        Dear Colleague,    Thank you for referring your patient, Jennifer Cervantes, to the [facility]REAT[facility]. Please see a copy of my visit note below.    Nursing Note  Jennifer Cervantes [facility] for:   Chief Complaint   Patient presents with     Infusion     fluids/ pain meds     During [facility]'s [facility] appointment, orders from  were completed.  Frequency: as needed    Progress note:  Patient identification verified by name and date of birth.  Assessment completed.  Vitals recorded in Doc Flowsheets.  Patient was provided with education regarding medication/procedure and possible side effects.  Patient verbalized understanding.     present during visit today: Not Applicable.    Treatment Conditions: Morphine administered Q1H x 3 per orders. Pt declined antiemetic and diphenhydramine today.     Premedications: were administered.     Drug Waste Record: No    Infusion length and rate:  infusion given over approximately 2 hours    Labs: were drawn per orders.     Vascular access: port accessed today.    Is the next appt scheduled? PRN  Asymptomatic COVID test completed? no    Post Infusion Assessment:  Patient tolerated infusion without incident.     Discharge Plan:   Follow up plan of care with: ongoing infusions [facility]. and primary care provider,  Discharge instructions were reviewed with patient.  Patient/representative verbalized understanding of discharge instructions and all questions answered.  Patient discharged [facility] in stable condition.    Lis Cabrera RN    Administrations This Visit     dextrose 5% and 0.45% NaCl BOLUS     Admin Date  01/25/2022 Action  New Bag Dose  1,000 mL Rate  500 mL/hr Route  Intravenous  Administered By  Lis Cabrera RN          morphine (PF) injection 2 mg     Admin Date  01/25/2022 Action  Given Dose  2 mg Route  Intravenous Administered By  Lis Cabrera RN           Admin Date  01/25/2022 Action  Given Dose  2 mg Route  Intravenous Administered By  Lis Cabrera RN           Admin Date  01/25/2022 Action  Given Dose  2 mg Route  Intravenous Administered By  Lis Cabrera RN              /73 (BP Location: Left arm, Patient Position: Semi-Short's)   Pulse 103   Temp 98  F (36.7  C) (Oral)   Resp 16   SpO2 99%           Again, thank you for allowing me to participate in the care of your patient.      Sincerely,    [facility]

## 2022-01-27 NOTE — PROGRESS NOTES
This is a recent snapshot of the patient's [facility] medical record.  For current drug dose and complete information and questions, call [phone] or In Basket pool, [facility]v home infusion ([id number])  CSN Number:  [id number]

## 2022-01-29 ENCOUNTER — HOSPITAL ENCOUNTER (INPATIENT)
Facility: CLINIC | Age: 22
LOS: 3 days | Discharge: HOME | DRG: 812 | End: 2022-02-01
Attending: EMERGENCY MEDICINE | Admitting: INTERNAL MEDICINE
Payer: COMMERCIAL

## 2022-01-29 ENCOUNTER — APPOINTMENT (OUTPATIENT)
Dept: GENERAL RADIOLOGY | Facility: CLINIC | Age: 22
DRG: 812 | End: 2022-01-29
Attending: EMERGENCY MEDICINE
Payer: COMMERCIAL

## 2022-01-29 ENCOUNTER — APPOINTMENT (OUTPATIENT)
Dept: CARDIOLOGY | Facility: CLINIC | Age: 22
DRG: 812 | End: 2022-01-29
Attending: PHYSICIAN ASSISTANT
Payer: COMMERCIAL

## 2022-01-29 DIAGNOSIS — D57.00 SICKLE CELL PAIN CRISIS (H): ICD-10-CM

## 2022-01-29 DIAGNOSIS — Z51.81 SUBTHERAPEUTIC ANTICOAGULATION: ICD-10-CM

## 2022-01-29 DIAGNOSIS — Z79.01 SUBTHERAPEUTIC ANTICOAGULATION: ICD-10-CM

## 2022-01-29 DIAGNOSIS — Z20.822 COVID-19 RULED OUT BY LABORATORY TESTING: ICD-10-CM

## 2022-01-29 DIAGNOSIS — I26.99 PULMONARY EMBOLISM, OTHER, UNSPECIFIED CHRONICITY, UNSPECIFIED WHETHER ACUTE COR PULMONALE PRESENT (H): Primary | ICD-10-CM

## 2022-01-29 DIAGNOSIS — R07.81 PLEURITIC CHEST PAIN: ICD-10-CM

## 2022-01-29 DIAGNOSIS — I27.82 CHRONIC PULMONARY EMBOLISM WITHOUT ACUTE COR PULMONALE, UNSPECIFIED PULMONARY EMBOLISM TYPE (H): ICD-10-CM

## 2022-01-29 LAB
ABO/RH(D): NORMAL
ALBUMIN SERPL-MCNC: 3.6 G/DL (ref 3.4–5)
ALBUMIN UR-MCNC: NEGATIVE MG/DL
ALP SERPL-CCNC: 81 U/L (ref 40–150)
ALT SERPL W P-5'-P-CCNC: 40 U/L (ref 0–50)
ANION GAP SERPL CALCULATED.3IONS-SCNC: 5 MMOL/L (ref 3–14)
ANTIBODY SCREEN: NEGATIVE
APPEARANCE UR: CLEAR
APTT PPP: 34 SECONDS (ref 22–38)
AST SERPL W P-5'-P-CCNC: 34 U/L (ref 0–45)
B-HCG SERPL-ACNC: <1 IU/L (ref 0–5)
BASOPHILS # BLD MANUAL: 0 10E3/UL (ref 0–0.2)
BASOPHILS # BLD MANUAL: 0.3 10E3/UL (ref 0–0.2)
BASOPHILS NFR BLD MANUAL: 0 %
BASOPHILS NFR BLD MANUAL: 2 %
BILIRUB DIRECT SERPL-MCNC: 0.5 MG/DL (ref 0–0.2)
BILIRUB SERPL-MCNC: 2.1 MG/DL (ref 0.2–1.3)
BILIRUB UR QL STRIP: NEGATIVE
BUN SERPL-MCNC: 8 MG/DL (ref 7–30)
C PNEUM DNA SPEC QL NAA+PROBE: NOT DETECTED
CALCIUM SERPL-MCNC: 8.8 MG/DL (ref 8.5–10.1)
CHLORIDE BLD-SCNC: 112 MMOL/L (ref 94–109)
CK SERPL-CCNC: 43 U/L (ref 30–225)
CO2 SERPL-SCNC: 22 MMOL/L (ref 20–32)
COLOR UR AUTO: ABNORMAL
CREAT SERPL-MCNC: 0.66 MG/DL (ref 0.52–1.04)
D DIMER PPP FEU-MCNC: 3.13 UG/ML FEU (ref 0–0.5)
EOSINOPHIL # BLD MANUAL: 0.7 10E3/UL (ref 0–0.7)
EOSINOPHIL # BLD MANUAL: 1 10E3/UL (ref 0–0.7)
EOSINOPHIL NFR BLD MANUAL: 5 %
EOSINOPHIL NFR BLD MANUAL: 7 %
ERYTHROCYTE [DISTWIDTH] IN BLOOD BY AUTOMATED COUNT: 23.3 % (ref 10–15)
ERYTHROCYTE [DISTWIDTH] IN BLOOD BY AUTOMATED COUNT: 23.8 % (ref 10–15)
FLUAV H1 2009 PAND RNA SPEC QL NAA+PROBE: NOT DETECTED
FLUAV H1 RNA SPEC QL NAA+PROBE: NOT DETECTED
FLUAV H3 RNA SPEC QL NAA+PROBE: NOT DETECTED
FLUAV RNA SPEC QL NAA+PROBE: NOT DETECTED
FLUBV RNA SPEC QL NAA+PROBE: NOT DETECTED
GFR SERPL CREATININE-BSD FRML MDRD: >90 ML/MIN/1.73M2
GLUCOSE BLD-MCNC: 100 MG/DL (ref 70–99)
GLUCOSE UR STRIP-MCNC: NEGATIVE MG/DL
HADV DNA SPEC QL NAA+PROBE: NOT DETECTED
HCOV PNL SPEC NAA+PROBE: NOT DETECTED
HCT VFR BLD AUTO: 20.8 % (ref 35–47)
HCT VFR BLD AUTO: 20.8 % (ref 35–47)
HGB BLD-MCNC: 7 G/DL (ref 11.7–15.7)
HGB BLD-MCNC: 7.2 G/DL (ref 11.7–15.7)
HGB UR QL STRIP: NEGATIVE
HMPV RNA SPEC QL NAA+PROBE: NOT DETECTED
HPIV1 RNA SPEC QL NAA+PROBE: NOT DETECTED
HPIV2 RNA SPEC QL NAA+PROBE: NOT DETECTED
HPIV3 RNA SPEC QL NAA+PROBE: NOT DETECTED
HPIV4 RNA SPEC QL NAA+PROBE: NOT DETECTED
INR PPP: 1.24 (ref 0.85–1.15)
KETONES UR STRIP-MCNC: NEGATIVE MG/DL
LACTATE SERPL-SCNC: 0.5 MMOL/L (ref 0.7–2)
LEUKOCYTE ESTERASE UR QL STRIP: NEGATIVE
LIPASE SERPL-CCNC: 218 U/L (ref 73–393)
LVEF ECHO: NORMAL
LYMPHOCYTES # BLD MANUAL: 2 10E3/UL (ref 0.8–5.3)
LYMPHOCYTES # BLD MANUAL: 2.9 10E3/UL (ref 0.8–5.3)
LYMPHOCYTES NFR BLD MANUAL: 14 %
LYMPHOCYTES NFR BLD MANUAL: 20 %
M PNEUMO DNA SPEC QL NAA+PROBE: NOT DETECTED
MCH RBC QN AUTO: 28.7 PG (ref 26.5–33)
MCH RBC QN AUTO: 29.3 PG (ref 26.5–33)
MCHC RBC AUTO-ENTMCNC: 33.7 G/DL (ref 31.5–36.5)
MCHC RBC AUTO-ENTMCNC: 34.6 G/DL (ref 31.5–36.5)
MCV RBC AUTO: 85 FL (ref 78–100)
MCV RBC AUTO: 85 FL (ref 78–100)
MONOCYTES # BLD MANUAL: 0.7 10E3/UL (ref 0–1.3)
MONOCYTES # BLD MANUAL: 1 10E3/UL (ref 0–1.3)
MONOCYTES NFR BLD MANUAL: 5 %
MONOCYTES NFR BLD MANUAL: 7 %
NEUTROPHILS # BLD MANUAL: 10.5 10E3/UL (ref 1.6–8.3)
NEUTROPHILS # BLD MANUAL: 9.9 10E3/UL (ref 1.6–8.3)
NEUTROPHILS NFR BLD MANUAL: 68 %
NEUTROPHILS NFR BLD MANUAL: 72 %
NITRATE UR QL: NEGATIVE
NRBC # BLD AUTO: 1.9 10E3/UL
NRBC # BLD AUTO: 2.5 10E3/UL
NRBC BLD MANUAL-RTO: 13 %
NRBC BLD MANUAL-RTO: 17 %
PH UR STRIP: 5.5 [PH] (ref 5–7)
PLAT MORPH BLD: ABNORMAL
PLAT MORPH BLD: ABNORMAL
PLATELET # BLD AUTO: 472 10E3/UL (ref 150–450)
PLATELET # BLD AUTO: 529 10E3/UL (ref 150–450)
POLYCHROMASIA BLD QL SMEAR: ABNORMAL
POTASSIUM BLD-SCNC: 3.2 MMOL/L (ref 3.4–5.3)
PROCALCITONIN SERPL-MCNC: <0.05 NG/ML
PROT SERPL-MCNC: 7.6 G/DL (ref 6.8–8.8)
RBC # BLD AUTO: 2.44 10E6/UL (ref 3.8–5.2)
RBC # BLD AUTO: 2.46 10E6/UL (ref 3.8–5.2)
RBC MORPH BLD: ABNORMAL
RBC MORPH BLD: ABNORMAL
RBC URINE: 1 /HPF
RETICS # AUTO: 0.35 10E6/UL (ref 0.03–0.1)
RETICS/RBC NFR AUTO: 14.3 % (ref 0.5–2)
RSV RNA SPEC QL NAA+PROBE: NOT DETECTED
RSV RNA SPEC QL NAA+PROBE: NOT DETECTED
RV+EV RNA SPEC QL NAA+PROBE: NOT DETECTED
SARS-COV-2 RNA RESP QL NAA+PROBE: NEGATIVE
SICKLE CELLS BLD QL SMEAR: ABNORMAL
SICKLE CELLS BLD QL SMEAR: ABNORMAL
SODIUM SERPL-SCNC: 139 MMOL/L (ref 133–144)
SP GR UR STRIP: 1.01 (ref 1–1.03)
SPECIMEN EXPIRATION DATE: NORMAL
SQUAMOUS EPITHELIAL: 1 /HPF
TARGETS BLD QL SMEAR: SLIGHT
TARGETS BLD QL SMEAR: SLIGHT
TRANSITIONAL EPI: <1 /HPF
TROPONIN I SERPL HS-MCNC: <3 NG/L
UROBILINOGEN UR STRIP-MCNC: NORMAL MG/DL
WBC # BLD AUTO: 14.5 10E3/UL (ref 4–11)
WBC # BLD AUTO: 14.6 10E3/UL (ref 4–11)
WBC URINE: 2 /HPF

## 2022-01-29 PROCEDURE — 83605 ASSAY OF LACTIC ACID: CPT

## 2022-01-29 PROCEDURE — U0003 INFECTIOUS AGENT DETECTION BY NUCLEIC ACID (DNA OR RNA); SEVERE ACUTE RESPIRATORY SYNDROME CORONAVIRUS 2 (SARS-COV-2) (CORONAVIRUS DISEASE [COVID-19]), AMPLIFIED PROBE TECHNIQUE, MAKING USE OF HIGH THROUGHPUT TECHNOLOGIES AS DESCRIBED BY CMS-2020-01-R: HCPCS | Performed by: EMERGENCY MEDICINE

## 2022-01-29 PROCEDURE — 36415 COLL VENOUS BLD VENIPUNCTURE: CPT | Performed by: PHYSICIAN ASSISTANT

## 2022-01-29 PROCEDURE — 84145 PROCALCITONIN (PCT): CPT | Performed by: PHYSICIAN ASSISTANT

## 2022-01-29 PROCEDURE — 96376 TX/PRO/DX INJ SAME DRUG ADON: CPT | Performed by: EMERGENCY MEDICINE

## 2022-01-29 PROCEDURE — 93306 TTE W/DOPPLER COMPLETE: CPT

## 2022-01-29 PROCEDURE — 99285 EMERGENCY DEPT VISIT HI MDM: CPT | Mod: 25 | Performed by: EMERGENCY MEDICINE

## 2022-01-29 PROCEDURE — 120N000011 HC R&B TRANSPLANT UMMC

## 2022-01-29 PROCEDURE — 85610 PROTHROMBIN TIME: CPT | Performed by: EMERGENCY MEDICINE

## 2022-01-29 PROCEDURE — 85014 HEMATOCRIT: CPT | Performed by: EMERGENCY MEDICINE

## 2022-01-29 PROCEDURE — 86901 BLOOD TYPING SEROLOGIC RH(D): CPT | Performed by: EMERGENCY MEDICINE

## 2022-01-29 PROCEDURE — 258N000003 HC RX IP 258 OP 636: Performed by: INTERNAL MEDICINE

## 2022-01-29 PROCEDURE — 85730 THROMBOPLASTIN TIME PARTIAL: CPT | Performed by: EMERGENCY MEDICINE

## 2022-01-29 PROCEDURE — 85014 HEMATOCRIT: CPT | Performed by: PHYSICIAN ASSISTANT

## 2022-01-29 PROCEDURE — 96367 TX/PROPH/DG ADDL SEQ IV INF: CPT | Performed by: EMERGENCY MEDICINE

## 2022-01-29 PROCEDURE — 258N000003 HC RX IP 258 OP 636: Performed by: PHYSICIAN ASSISTANT

## 2022-01-29 PROCEDURE — 87633 RESP VIRUS 12-25 TARGETS: CPT | Performed by: PHYSICIAN ASSISTANT

## 2022-01-29 PROCEDURE — 86850 RBC ANTIBODY SCREEN: CPT | Performed by: EMERGENCY MEDICINE

## 2022-01-29 PROCEDURE — 87040 BLOOD CULTURE FOR BACTERIA: CPT | Performed by: PHYSICIAN ASSISTANT

## 2022-01-29 PROCEDURE — 36591 DRAW BLOOD OFF VENOUS DEVICE: CPT

## 2022-01-29 PROCEDURE — 80053 COMPREHEN METABOLIC PANEL: CPT | Performed by: EMERGENCY MEDICINE

## 2022-01-29 PROCEDURE — 250N000013 HC RX MED GY IP 250 OP 250 PS 637: Performed by: EMERGENCY MEDICINE

## 2022-01-29 PROCEDURE — 82248 BILIRUBIN DIRECT: CPT | Performed by: PHYSICIAN ASSISTANT

## 2022-01-29 PROCEDURE — 36591 DRAW BLOOD OFF VENOUS DEVICE: CPT | Performed by: PHYSICIAN ASSISTANT

## 2022-01-29 PROCEDURE — 250N000011 HC RX IP 250 OP 636: Performed by: EMERGENCY MEDICINE

## 2022-01-29 PROCEDURE — 96361 HYDRATE IV INFUSION ADD-ON: CPT | Performed by: EMERGENCY MEDICINE

## 2022-01-29 PROCEDURE — 96375 TX/PRO/DX INJ NEW DRUG ADDON: CPT | Performed by: EMERGENCY MEDICINE

## 2022-01-29 PROCEDURE — 250N000011 HC RX IP 250 OP 636: Performed by: PHYSICIAN ASSISTANT

## 2022-01-29 PROCEDURE — 82550 ASSAY OF CK (CPK): CPT | Performed by: PHYSICIAN ASSISTANT

## 2022-01-29 PROCEDURE — 84484 ASSAY OF TROPONIN QUANT: CPT | Performed by: PHYSICIAN ASSISTANT

## 2022-01-29 PROCEDURE — 71046 X-RAY EXAM CHEST 2 VIEWS: CPT

## 2022-01-29 PROCEDURE — 36415 COLL VENOUS BLD VENIPUNCTURE: CPT | Performed by: EMERGENCY MEDICINE

## 2022-01-29 PROCEDURE — 81001 URINALYSIS AUTO W/SCOPE: CPT | Performed by: PHYSICIAN ASSISTANT

## 2022-01-29 PROCEDURE — 83690 ASSAY OF LIPASE: CPT | Performed by: PHYSICIAN ASSISTANT

## 2022-01-29 PROCEDURE — 84702 CHORIONIC GONADOTROPIN TEST: CPT | Performed by: PHYSICIAN ASSISTANT

## 2022-01-29 PROCEDURE — 99223 1ST HOSP IP/OBS HIGH 75: CPT | Mod: AI | Performed by: PHYSICIAN ASSISTANT

## 2022-01-29 PROCEDURE — 250N000013 HC RX MED GY IP 250 OP 250 PS 637: Performed by: INTERNAL MEDICINE

## 2022-01-29 PROCEDURE — 93306 TTE W/DOPPLER COMPLETE: CPT | Mod: 26 | Performed by: INTERNAL MEDICINE

## 2022-01-29 PROCEDURE — 99221 1ST HOSP IP/OBS SF/LOW 40: CPT | Performed by: INTERNAL MEDICINE

## 2022-01-29 PROCEDURE — 71046 X-RAY EXAM CHEST 2 VIEWS: CPT | Mod: 26 | Performed by: RADIOLOGY

## 2022-01-29 PROCEDURE — 85379 FIBRIN DEGRADATION QUANT: CPT | Performed by: EMERGENCY MEDICINE

## 2022-01-29 PROCEDURE — 85045 AUTOMATED RETICULOCYTE COUNT: CPT | Performed by: EMERGENCY MEDICINE

## 2022-01-29 PROCEDURE — C9803 HOPD COVID-19 SPEC COLLECT: HCPCS | Performed by: EMERGENCY MEDICINE

## 2022-01-29 PROCEDURE — 250N000009 HC RX 250: Performed by: EMERGENCY MEDICINE

## 2022-01-29 PROCEDURE — 93308 TTE F-UP OR LMTD: CPT | Performed by: EMERGENCY MEDICINE

## 2022-01-29 PROCEDURE — 96365 THER/PROPH/DIAG IV INF INIT: CPT | Performed by: EMERGENCY MEDICINE

## 2022-01-29 PROCEDURE — 93005 ELECTROCARDIOGRAM TRACING: CPT | Performed by: EMERGENCY MEDICINE

## 2022-01-29 PROCEDURE — 93308 TTE F-UP OR LMTD: CPT | Mod: 26 | Performed by: EMERGENCY MEDICINE

## 2022-01-29 PROCEDURE — 250N000013 HC RX MED GY IP 250 OP 250 PS 637: Performed by: PHYSICIAN ASSISTANT

## 2022-01-29 PROCEDURE — 258N000003 HC RX IP 258 OP 636: Performed by: EMERGENCY MEDICINE

## 2022-01-29 PROCEDURE — 250N000009 HC RX 250: Performed by: PHYSICIAN ASSISTANT

## 2022-01-29 RX ORDER — ACETAMINOPHEN 325 MG/1
975 TABLET ORAL 3 TIMES DAILY
Status: DISCONTINUED | OUTPATIENT
Start: 2022-01-29 | End: 2022-02-01 | Stop reason: HOSPADM

## 2022-01-29 RX ORDER — POLYETHYLENE GLYCOL 3350 17 G/17G
17 POWDER, FOR SOLUTION ORAL DAILY
Status: DISCONTINUED | OUTPATIENT
Start: 2022-01-29 | End: 2022-02-01 | Stop reason: HOSPADM

## 2022-01-29 RX ORDER — ACETAMINOPHEN 325 MG/1
650 TABLET ORAL EVERY 6 HOURS PRN
Status: DISCONTINUED | OUTPATIENT
Start: 2022-01-29 | End: 2022-01-29

## 2022-01-29 RX ORDER — NALOXONE HYDROCHLORIDE 0.4 MG/ML
0.4 INJECTION, SOLUTION INTRAMUSCULAR; INTRAVENOUS; SUBCUTANEOUS
Status: DISCONTINUED | OUTPATIENT
Start: 2022-01-29 | End: 2022-02-01 | Stop reason: HOSPADM

## 2022-01-29 RX ORDER — NALOXONE HYDROCHLORIDE 0.4 MG/ML
0.2 INJECTION, SOLUTION INTRAMUSCULAR; INTRAVENOUS; SUBCUTANEOUS
Status: DISCONTINUED | OUTPATIENT
Start: 2022-01-29 | End: 2022-02-01 | Stop reason: HOSPADM

## 2022-01-29 RX ORDER — DIPHENHYDRAMINE HCL 25 MG
25-50 CAPSULE ORAL
Status: DISCONTINUED | OUTPATIENT
Start: 2022-01-29 | End: 2022-02-01 | Stop reason: HOSPADM

## 2022-01-29 RX ORDER — LIDOCAINE 40 MG/G
CREAM TOPICAL
Status: DISCONTINUED | OUTPATIENT
Start: 2022-01-29 | End: 2022-02-01 | Stop reason: HOSPADM

## 2022-01-29 RX ORDER — ALBUTEROL SULFATE 90 UG/1
2 AEROSOL, METERED RESPIRATORY (INHALATION) EVERY 6 HOURS PRN
Status: DISCONTINUED | OUTPATIENT
Start: 2022-01-29 | End: 2022-02-01 | Stop reason: HOSPADM

## 2022-01-29 RX ORDER — HYDROXYUREA 500 MG/1
3000 CAPSULE ORAL DAILY
Status: DISCONTINUED | OUTPATIENT
Start: 2022-01-29 | End: 2022-02-01 | Stop reason: HOSPADM

## 2022-01-29 RX ORDER — ALBUTEROL SULFATE 0.83 MG/ML
2.5 SOLUTION RESPIRATORY (INHALATION) EVERY 6 HOURS PRN
Status: DISCONTINUED | OUTPATIENT
Start: 2022-01-29 | End: 2022-02-01 | Stop reason: HOSPADM

## 2022-01-29 RX ORDER — OXYCODONE HYDROCHLORIDE 10 MG/1
20 TABLET ORAL EVERY 4 HOURS PRN
Status: DISCONTINUED | OUTPATIENT
Start: 2022-01-29 | End: 2022-01-31

## 2022-01-29 RX ORDER — OXYCODONE HYDROCHLORIDE 10 MG/1
20 TABLET ORAL ONCE
Status: COMPLETED | OUTPATIENT
Start: 2022-01-29 | End: 2022-01-29

## 2022-01-29 RX ORDER — AMOXICILLIN 250 MG
2 CAPSULE ORAL 2 TIMES DAILY PRN
Status: DISCONTINUED | OUTPATIENT
Start: 2022-01-29 | End: 2022-02-01 | Stop reason: HOSPADM

## 2022-01-29 RX ORDER — MORPHINE SULFATE 15 MG/1
15 TABLET, FILM COATED, EXTENDED RELEASE ORAL 2 TIMES DAILY
Status: DISCONTINUED | OUTPATIENT
Start: 2022-01-29 | End: 2022-02-01 | Stop reason: HOSPADM

## 2022-01-29 RX ORDER — ONDANSETRON 4 MG/1
4 TABLET, ORALLY DISINTEGRATING ORAL EVERY 6 HOURS PRN
Status: DISCONTINUED | OUTPATIENT
Start: 2022-01-29 | End: 2022-02-01 | Stop reason: HOSPADM

## 2022-01-29 RX ORDER — AMOXICILLIN 250 MG
1 CAPSULE ORAL 2 TIMES DAILY PRN
Status: DISCONTINUED | OUTPATIENT
Start: 2022-01-29 | End: 2022-02-01 | Stop reason: HOSPADM

## 2022-01-29 RX ORDER — KETOROLAC TROMETHAMINE 30 MG/ML
30 INJECTION, SOLUTION INTRAMUSCULAR; INTRAVENOUS EVERY 6 HOURS
Status: DISCONTINUED | OUTPATIENT
Start: 2022-01-29 | End: 2022-02-01 | Stop reason: HOSPADM

## 2022-01-29 RX ORDER — ONDANSETRON 2 MG/ML
4 INJECTION INTRAMUSCULAR; INTRAVENOUS EVERY 6 HOURS PRN
Status: DISCONTINUED | OUTPATIENT
Start: 2022-01-29 | End: 2022-02-01 | Stop reason: HOSPADM

## 2022-01-29 RX ORDER — LANOLIN ALCOHOL/MO/W.PET/CERES
6 CREAM (GRAM) TOPICAL
Status: DISCONTINUED | OUTPATIENT
Start: 2022-01-29 | End: 2022-02-01 | Stop reason: HOSPADM

## 2022-01-29 RX ORDER — BUDESONIDE AND FORMOTEROL FUMARATE DIHYDRATE 160; 4.5 UG/1; UG/1
2 AEROSOL RESPIRATORY (INHALATION) 2 TIMES DAILY
Status: DISCONTINUED | OUTPATIENT
Start: 2022-01-29 | End: 2022-01-29

## 2022-01-29 RX ORDER — POTASSIUM CHLORIDE 1.5 G/1.58G
40 POWDER, FOR SOLUTION ORAL ONCE
Status: COMPLETED | OUTPATIENT
Start: 2022-01-29 | End: 2022-01-29

## 2022-01-29 RX ORDER — KETOROLAC TROMETHAMINE 15 MG/ML
15 INJECTION, SOLUTION INTRAMUSCULAR; INTRAVENOUS ONCE
Status: COMPLETED | OUTPATIENT
Start: 2022-01-29 | End: 2022-01-29

## 2022-01-29 RX ORDER — DEFERASIROX 360 MG/1
1440 TABLET, FILM COATED ORAL EVERY EVENING
Status: DISCONTINUED | OUTPATIENT
Start: 2022-01-29 | End: 2022-02-01 | Stop reason: HOSPADM

## 2022-01-29 RX ORDER — CEFTRIAXONE 1 G/1
1 INJECTION, POWDER, FOR SOLUTION INTRAMUSCULAR; INTRAVENOUS ONCE
Status: COMPLETED | OUTPATIENT
Start: 2022-01-29 | End: 2022-01-29

## 2022-01-29 RX ORDER — SODIUM CHLORIDE, SODIUM LACTATE, POTASSIUM CHLORIDE, CALCIUM CHLORIDE 600; 310; 30; 20 MG/100ML; MG/100ML; MG/100ML; MG/100ML
INJECTION, SOLUTION INTRAVENOUS CONTINUOUS
Status: DISCONTINUED | OUTPATIENT
Start: 2022-01-29 | End: 2022-01-29

## 2022-01-29 RX ORDER — WARFARIN SODIUM 10 MG/1
20 TABLET ORAL
Status: COMPLETED | OUTPATIENT
Start: 2022-01-29 | End: 2022-01-29

## 2022-01-29 RX ORDER — SODIUM CHLORIDE 9 MG/ML
INJECTION, SOLUTION INTRAVENOUS CONTINUOUS
Status: DISCONTINUED | OUTPATIENT
Start: 2022-01-29 | End: 2022-01-30

## 2022-01-29 RX ORDER — MAGNESIUM HYDROXIDE/ALUMINUM HYDROXICE/SIMETHICONE 120; 1200; 1200 MG/30ML; MG/30ML; MG/30ML
30 SUSPENSION ORAL EVERY 6 HOURS PRN
Status: DISCONTINUED | OUTPATIENT
Start: 2022-01-29 | End: 2022-02-01 | Stop reason: HOSPADM

## 2022-01-29 RX ORDER — MORPHINE SULFATE 15 MG/1
15 TABLET, FILM COATED, EXTENDED RELEASE ORAL EVERY 12 HOURS
Status: DISCONTINUED | OUTPATIENT
Start: 2022-01-29 | End: 2022-01-29

## 2022-01-29 RX ADMIN — KETOROLAC TROMETHAMINE 30 MG: 30 INJECTION, SOLUTION INTRAMUSCULAR at 12:04

## 2022-01-29 RX ADMIN — OXYCODONE HYDROCHLORIDE 20 MG: 10 TABLET ORAL at 04:24

## 2022-01-29 RX ADMIN — SODIUM CHLORIDE: 9 INJECTION, SOLUTION INTRAVENOUS at 16:34

## 2022-01-29 RX ADMIN — DEXTROSE AND SODIUM CHLORIDE 1000 ML: 5; 450 INJECTION, SOLUTION INTRAVENOUS at 05:14

## 2022-01-29 RX ADMIN — KETOROLAC TROMETHAMINE 15 MG: 15 INJECTION, SOLUTION INTRAMUSCULAR; INTRAVENOUS at 05:45

## 2022-01-29 RX ADMIN — ENOXAPARIN SODIUM 80 MG: 80 INJECTION SUBCUTANEOUS at 18:51

## 2022-01-29 RX ADMIN — KETOROLAC TROMETHAMINE 30 MG: 30 INJECTION, SOLUTION INTRAMUSCULAR at 18:51

## 2022-01-29 RX ADMIN — ACETAMINOPHEN 975 MG: 325 TABLET, FILM COATED ORAL at 20:58

## 2022-01-29 RX ADMIN — SODIUM CHLORIDE, POTASSIUM CHLORIDE, SODIUM LACTATE AND CALCIUM CHLORIDE: 600; 310; 30; 20 INJECTION, SOLUTION INTRAVENOUS at 12:04

## 2022-01-29 RX ADMIN — KETAMINE HYDROCHLORIDE 4 MG/HR: 100 INJECTION, SOLUTION, CONCENTRATE INTRAMUSCULAR; INTRAVENOUS at 15:00

## 2022-01-29 RX ADMIN — Medication 5 MG: at 05:41

## 2022-01-29 RX ADMIN — ACETAMINOPHEN 975 MG: 325 TABLET, FILM COATED ORAL at 13:59

## 2022-01-29 RX ADMIN — WARFARIN SODIUM 20 MG: 10 TABLET ORAL at 18:51

## 2022-01-29 RX ADMIN — CEFTRIAXONE SODIUM 1 G: 1 INJECTION, POWDER, FOR SOLUTION INTRAMUSCULAR; INTRAVENOUS at 06:20

## 2022-01-29 RX ADMIN — HYDROXYUREA 3000 MG: 500 CAPSULE ORAL at 12:09

## 2022-01-29 RX ADMIN — Medication 4 MG: at 09:55

## 2022-01-29 RX ADMIN — ENOXAPARIN SODIUM 80 MG: 80 INJECTION SUBCUTANEOUS at 06:56

## 2022-01-29 RX ADMIN — MORPHINE SULFATE 15 MG: 15 TABLET, EXTENDED RELEASE ORAL at 20:59

## 2022-01-29 RX ADMIN — POTASSIUM CHLORIDE 40 MEQ: 1.5 POWDER, FOR SOLUTION ORAL at 06:19

## 2022-01-29 RX ADMIN — FLUTICASONE FUROATE AND VILANTEROL TRIFENATATE 1 PUFF: 200; 25 POWDER RESPIRATORY (INHALATION) at 13:58

## 2022-01-29 RX ADMIN — MORPHINE SULFATE 15 MG: 15 TABLET, EXTENDED RELEASE ORAL at 12:04

## 2022-01-29 RX ADMIN — DEFERASIROX 1440 MG: 360 TABLET ORAL at 21:52

## 2022-01-29 ASSESSMENT — ACTIVITIES OF DAILY LIVING (ADL)
ADLS_ACUITY_SCORE: 8
ADLS_ACUITY_SCORE: 4
ADLS_ACUITY_SCORE: 4
FALL_HISTORY_WITHIN_LAST_SIX_MONTHS: NO
DOING_ERRANDS_INDEPENDENTLY_DIFFICULTY: NO
WALKING_OR_CLIMBING_STAIRS_DIFFICULTY: NO
ADLS_ACUITY_SCORE: 4
VISION_MANAGEMENT: GLASSESS
ADLS_ACUITY_SCORE: 4
DRESSING/BATHING_DIFFICULTY: NO
ADLS_ACUITY_SCORE: 8
HEARING_DIFFICULTY_OR_DEAF: NO
ADLS_ACUITY_SCORE: 4
ADLS_ACUITY_SCORE: 8
ADLS_ACUITY_SCORE: 4
DIFFICULTY_EATING/SWALLOWING: NO
DIFFICULTY_COMMUNICATING: NO
ADLS_ACUITY_SCORE: 8
ADLS_ACUITY_SCORE: 4
ADLS_ACUITY_SCORE: 8
CONCENTRATING,_REMEMBERING_OR_MAKING_DECISIONS_DIFFICULTY: NO
WEAR_GLASSES_OR_BLIND: YES
ADLS_ACUITY_SCORE: 8
ADLS_ACUITY_SCORE: 4
PATIENT_/_FAMILY_COMMUNICATION_STYLE: SPOKEN LANGUAGE (ENGLISH OR BILINGUAL)
TOILETING_ISSUES: NO

## 2022-01-29 ASSESSMENT — MIFFLIN-ST. JEOR: SCORE: 1516.11

## 2022-01-29 NOTE — ED PROVIDER NOTES
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  Pain is primarily in the low back and joints which patient reports is typical of her sickle cell crisis.  Has been ongoing for several days.  Additionally also notes diffuse pain in her chest-sharp radiating to the back much worse with breathing.    No syncopal episodes, no recent falls.  Reports compliance with all her medications including Coumadin.    No recent fevers or chills no nausea vomiting or diarrhea.    Patient was recently seen here in the ED on 1/23/2022 complaining of sickle cell pain crisis.  Received pain medication via her care plan.  Patient was discharged with recommended follow-up at infusion center or with her hematology team.     Past Medical History  Past Medical History:   Diagnosis Date     Anxiety      Bleeding disorder (H)      Blood clotting disorder (H)      Cerebral infarction (H) 2015     Cognitive developmental delay     low IQ. Please recognize when managing pain and planning with her     Depressive disorder      Hemiplegia and hemiparesis following cerebral infarction affecting right dominant side (H)     right hand contractures     Iron overload due to repeated red blood cell transfusions      Migraines      Multiple subsegmental pulmonary emboli without acute cor pulmonale (H) 02/01/2021     Oppositional defiant behavior      Superficial venous thrombosis of arm, right 03/25/2021     Uncomplicated asthma      Past Surgical History:   Procedure Laterality Date     AS INSERT TUNNELED CV 2 CATH W/O PORT/PUMP       CHOLECYSTECTOMY       CV RIGHT HEART CATH MEASUREMENTS RECORDED N/A  11/18/2021    Procedure: Right Heart Cath;  Surgeon: Jackson Stauffer MD;  Location:  HEART CARDIAC CATH LAB     INSERT PORT VASCULAR ACCESS Left 4/21/2021    Procedure: INSERTION, VASCULAR ACCESS PORT (NOT SURE ON SIDE UNTIL REMOVAL);  Surgeon: Rajan More MD;  Location: [facility] OR     IR CHEST PORT PLACEMENT > 5 YRS OF AGE  4/21/2021     IR CVC NON TUNNEL LINE REMOVAL  5/6/2021     IR CVC NON TUNNEL PLACEMENT  04/07/2020     IR CVC NON TUNNEL PLACEMENT  4/30/2021     IR PORT REMOVAL LEFT  4/21/2021     REMOVE PORT VASCULAR ACCESS Left 4/21/2021    Procedure: REMOVAL, VASCULAR ACCESS PORT LEFT;  Surgeon: Rajan More MD;  Location: [facility] OR     REPAIR TENDON ELBOW Right 10/02/2019    Procedure: Right Elbow Flexor Lengthening, Flexor Pronator Slide Of Wrist and Finger, Thumb Adductor Lengthening;  Surgeon: Anai Franco MD;  Location: [facility] OR     TONSILLECTOMY Bilateral 10/02/2019    Procedure: Bilateral Tonsillectomy;  Surgeon: Farhana Guy MD;  Location: [facility] OR     ZZC BREAST REDUCTION (INCLUDES LIPO) TIER 3 Bilateral 04/23/2019     No current outpatient medications on file.    Allergies   Allergen Reactions     Contrast Dye      Hives and breathing issues     Fish-Derived Products Hives     Seafood Hives     Diagnostic X-Ray Materials      Gadolinium      Family History  Family History   Problem Relation Age of Onset     Sickle Cell Trait Mother      Hypertension Mother      Asthma Mother      Sickle Cell Trait Father      Social History   Social History     Tobacco Use     Smoking status: Never Smoker     Smokeless tobacco: Never Used   Substance Use Topics     Alcohol use: Not Currently     Alcohol/week: 0.0 standard drinks     Drug use: Never      Past medical history, past surgical history, medications, allergies, family history, and social history were reviewed with the patient. No additional pertinent items.       Review of Systems  A complete review of systems was performed with  "pertinent positives and negatives noted in the HPI, and all other systems negative.    Physical Exam   BP: 136/78  Pulse: 108  Temp: 98.8  F (37.1  C)  Resp: 18  Height: 162.6 cm (5' 4\")  Weight: 77.1 kg (170 lb)  SpO2: 96 %  Physical Exam  GEN: In pain appearing, non toxic, cooperative and conversant.   HEENT: The head is normocephalic and atraumatic. Pupils are equal round and reactive to light. Extraocular motions are intact. There is no facial swelling. The neck is nontender and supple.   CV: Regular tachycardia. 2+ radial pulses bilaterally.  PULM: Clear to auscultation bilaterally.  ABD: Soft, nontender, nondistended.   EXT: Full range of motion.  No edema.  NEURO: Cranial nerves II through XII are intact and symmetric. Bilateral upper and lower extremities grossly show full range of motion without any focal deficits.   PSYCH: Calm and cooperative, interactive.     ED Course      Procedures  Results for orders placed during the hospital encounter of 01/29/22    POC US ECHO LIMITED    Impression  Limited Bedside Cardiac Ultrasound, performed and interpreted by me.  Indication: Chest Pain.  Parasternal long axis, parasternal short axis and apical 4 chamber views were acquired.  Image quality was satisfactory.    Findings:  Global left ventricular function appears intact.  Chambers do not appear dilated.  There is no evidence of free fluid within the pericardium.    IMPRESSION: Grossly normal left ventricular function and chamber size.  No pericardial effusion..                 Results for orders placed or performed during the hospital encounter of 01/29/22   XR Chest 2 Views     Status: None    Narrative    EXAM: XR CHEST 2 VW  LOCATION: [facility]  DATE/TIME: 1/29/2022 4:14 AM    INDICATION: chest pain  COMPARISON: 09/20/2021      Impression    IMPRESSION: Left-sided port with tip over atrial caval junction. No pneumothorax or pleural effusion. No focal " consolidation. Cardiac silhouette within normal limits. Right upper quadrant cholecystectomy clips.   POC US ECHO LIMITED     Status: None    Impression    Limited Bedside Cardiac Ultrasound, performed and interpreted by me.   Indication: Chest Pain.  Parasternal long axis, parasternal short axis and apical 4 chamber views were acquired.   Image quality was satisfactory.    Findings:    Global left ventricular function appears intact.  Chambers do not appear dilated.  There is no evidence of free fluid within the pericardium.    IMPRESSION: Grossly normal left ventricular function and chamber size.  No pericardial effusion..     Basic metabolic panel     Status: Abnormal   Result Value Ref Range    Sodium 139 133 - 144 mmol/L    Potassium 3.2 (L) 3.4 - 5.3 mmol/L    Chloride 112 (H) 94 - 109 mmol/L    Carbon Dioxide (CO2) 22 20 - 32 mmol/L    Anion Gap 5 3 - 14 mmol/L    Urea Nitrogen 8 7 - 30 mg/dL    Creatinine 0.66 0.52 - 1.04 mg/dL    Calcium 8.8 8.5 - 10.1 mg/dL    Glucose 100 (H) 70 - 99 mg/dL    GFR Estimate >90 >60 mL/min/1.73m2   Reticulocyte count     Status: Abnormal   Result Value Ref Range    % Reticulocyte 14.3 (H) 0.5 - 2.0 %    Absolute Reticulocyte 0.351 (H) 0.025 - 0.095 10e6/uL   D dimer quantitative     Status: Abnormal   Result Value Ref Range    D-Dimer Quantitative 3.13 (H) 0.00 - 0.50 ug/mL FEU    Narrative    This D-dimer assay is intended for use in conjunction with a clinical pretest probability assessment model to exclude pulmonary embolism (PE) and deep venous thrombosis (DVT) in outpatients suspected of PE or DVT. The cut-off value is 0.50 ug/mL FEU.   INR     Status: Abnormal   Result Value Ref Range    INR 1.24 (H) 0.85 - 1.15   Partial thromboplastin time     Status: Normal   Result Value Ref Range    aPTT 34 22 - 38 Seconds   CBC with platelets and differential     Status: Abnormal   Result Value Ref Range    WBC Count 14.6 (H) 4.0 - 11.0 10e3/uL    RBC Count 2.46 (L) 3.80 - 5.20  10e6/uL    Hemoglobin 7.2 (L) 11.7 - 15.7 g/dL    Hematocrit 20.8 (L) 35.0 - 47.0 %    MCV 85 78 - 100 fL    MCH 29.3 26.5 - 33.0 pg    MCHC 34.6 31.5 - 36.5 g/dL    RDW 23.8 (H) 10.0 - 15.0 %    Platelet Count 529 (H) 150 - 450 10e3/uL   Asymptomatic COVID-19 Virus (Coronavirus) by PCR Midturbinate     Status: Normal    Specimen: Midturbinate; Swab   Result Value Ref Range    SARS CoV2 PCR Negative Negative, Testing sent to reference lab. Results will be returned via unsolicited result    Narrative    Testing was performed using the [employer] Xpress SARS-CoV-2 Assay on the  [employer]. Additional information about  this Emergency Use Authorization (EUA) assay can be found via the Lab  Guide. This test should be ordered for the detection of SARS-CoV-2 in  individuals who meet SARS-CoV-2 clinical and/or epidemiological  criteria. Test performance is unknown in asymptomatic patients. This  test is for in vitro diagnostic use under the FDA EUA for  laboratories certified under CLIA to perform high complexity testing.  This test has not been FDA cleared or approved. A negative result  does not rule out the presence of PCR inhibitors in the specimen or  target RNA in concentration below the limit of detection for the  assay. The possibility of a false negative should be considered if  the patient's recent exposure or clinical presentation suggests  COVID-19. This test was validated by the [facility]. This laboratory is certified under  the Clinical Laboratory Improvement Amendments of 1988 (CLIA-88) as  qualified to perform high complexity laboratory testing.     Manual Differential     Status: Abnormal   Result Value Ref Range    % Neutrophils 72 %    % Lymphocytes 14 %    % Monocytes 5 %    % Eosinophils 7 %    % Basophils 2 %    NRBCs per 100 WBC 13 (H) <=0 %    Absolute Neutrophils 10.5 (H) 1.6 - 8.3 10e3/uL    Absolute Lymphocytes 2.0 0.8 - 5.3  10e3/uL    Absolute Monocytes 0.7 0.0 - 1.3 10e3/uL    Absolute Eosinophils 1.0 (H) 0.0 - 0.7 10e3/uL    Absolute Basophils 0.3 (H) 0.0 - 0.2 10e3/uL    Absolute NRBCs 1.9 (H) <=0.0 10e3/uL    RBC Morphology Confirmed RBC Indices     Platelet Assessment  Automated Count Confirmed. Platelet morphology is normal.     Automated Count Confirmed. Platelet morphology is normal.    Polychromasia Moderate (A) None Seen    Sickle Cells Marked (A) None Seen    Target Cells Slight (A) None Seen   Troponin I     Status: Normal   Result Value Ref Range    Troponin I High Sensitivity <3 <54 ng/L   Procalcitonin     Status: Normal   Result Value Ref Range    Procalcitonin <0.05 <0.05 ng/mL   Hepatic panel     Status: Abnormal   Result Value Ref Range    Bilirubin Total 2.1 (H) 0.2 - 1.3 mg/dL    Bilirubin Direct 0.5 (H) 0.0 - 0.2 mg/dL    Protein Total 7.6 6.8 - 8.8 g/dL    Albumin 3.6 3.4 - 5.0 g/dL    Alkaline Phosphatase 81 40 - 150 U/L    AST 34 0 - 45 U/L    ALT 40 0 - 50 U/L   Lipase     Status: Normal   Result Value Ref Range    Lipase 218 73 - 393 U/L   CK total     Status: Normal   Result Value Ref Range    CK 43 30 - 225 U/L   CBC with platelets and differential     Status: Abnormal   Result Value Ref Range    WBC Count 14.5 (H) 4.0 - 11.0 10e3/uL    RBC Count 2.44 (L) 3.80 - 5.20 10e6/uL    Hemoglobin 7.0 (L) 11.7 - 15.7 g/dL    Hematocrit 20.8 (L) 35.0 - 47.0 %    MCV 85 78 - 100 fL    MCH 28.7 26.5 - 33.0 pg    MCHC 33.7 31.5 - 36.5 g/dL    RDW 23.3 (H) 10.0 - 15.0 %    Platelet Count 472 (H) 150 - 450 10e3/uL   Manual Differential     Status: Abnormal   Result Value Ref Range    % Neutrophils 68 %    % Lymphocytes 20 %    % Monocytes 7 %    % Eosinophils 5 %    % Basophils 0 %    NRBCs per 100 WBC 17 (H) <=0 %    Absolute Neutrophils 9.9 (H) 1.6 - 8.3 10e3/uL    Absolute Lymphocytes 2.9 0.8 - 5.3 10e3/uL    Absolute Monocytes 1.0 0.0 - 1.3 10e3/uL    Absolute Eosinophils 0.7 0.0 - 0.7 10e3/uL    Absolute Basophils  0.0 0.0 - 0.2 10e3/uL    Absolute NRBCs 2.5 (H) <=0.0 10e3/uL    RBC Morphology Confirmed RBC Indices     Platelet Assessment  Automated Count Confirmed. Platelet morphology is normal.     Automated Count Confirmed. Platelet morphology is normal.    Sickle Cells Marked (A) None Seen    Target Cells Slight (A) None Seen   UA reflex to Microscopic and Culture     Status: Abnormal    Specimen: Urine, Clean Catch   Result Value Ref Range    Color Urine Orange (A) Colorless, Straw, Light Yellow, Yellow    Appearance Urine Clear Clear    Glucose Urine Negative Negative mg/dL    Bilirubin Urine Negative Negative    Ketones Urine Negative Negative mg/dL    Specific Gravity Urine 1.012 1.003 - 1.035    Blood Urine Negative Negative    pH Urine 5.5 5.0 - 7.0    Protein Albumin Urine Negative Negative mg/dL    Urobilinogen Urine Normal Normal, 2.0 mg/dL    Nitrite Urine Negative Negative    Leukocyte Esterase Urine Negative Negative    RBC Urine 1 <=2 /HPF    WBC Urine 2 <=5 /HPF    Squamous Epithelials Urine 1 <=1 /HPF    Transitional Epithelials Urine <1 <=1 /HPF    Narrative    Urine Culture not indicated   Lactic Acid STAT     Status: Abnormal   Result Value Ref Range    Lactic Acid 0.5 (L) 0.7 - 2.0 mmol/L   HCG quantitative pregnancy     Status: Normal   Result Value Ref Range    hCG Quantitative <1 0 - 5 IU/L   EKG 12-lead, tracing only     Status: None (Preliminary result)   Result Value Ref Range    Systolic Blood Pressure  mmHg    Diastolic Blood Pressure  mmHg    Ventricular Rate 102 BPM    Atrial Rate 102 BPM    CO Interval 146 ms    QRS Duration 70 ms     ms    QTc 463 ms    P Axis 48 degrees    R AXIS 20 degrees    T Axis 5 degrees    Interpretation ECG Sinus tachycardia  Otherwise normal ECG      Hematology Adult IP Consult: Patient to be seen: Routine within 24 hrs; Call back #: [phone]; sickle cell pain crisis; Consultant may enter orders: Yes     Status: None ()    Narrative    Nighat Thornton  "MD     1/29/2022  3:26 PM  Hematology consult note    Reasons for Consult: -Sickle cell pain crisis    History of Present Illness: Ms. Cervantes is a 21-year-old woman with   hemoglobin SS and frequent pain crises, history of stroke with   right upper extremity hemiparesis, presents with sickle cell   pain.  She had visits to clinic for IV opioids on 1/24 and   1/25/2022.  She has been having chest pain, back and side pain   for about a week.  When I saw her, she said the chest pain was   gone, but still having some back pain.  She is asking whether   ketamine infusion will be started.  The INR was subtherapeutic on   admission.  Therefore enoxaparin was started.  She reports she   has not missed any doses of warfarin.    Past Medical History:  -Hemoglobin SS with frequent pain crises  -History of stroke with right upper extremity hemiparesis  -Iron overload secondary to transfusion  -Anxiety and depression  -Asthma  -History of pulmonary embolism and right upper extremity DVT.    Over the past 1 to 2 years she has been on apixaban, dabigatran   plus aspirin, and currently on warfarin.    Medications:  -Includes hydroxyurea 3000 mg daily, warfarin, deferasirox, MS   Contin as an outpatient.  -Inpatient has been switched to enoxaparin, received ceftriaxone.    Receiving ketamine, oxycodone, and Toradol as per her   hematologist recommendation..    Social History: smoking-never, alcohol-no.  She lives with her   mother and extended family.    Review of systems:  As in HPI.  The rest of the > 10 point review   of systems was negative.      PHYSICAL EXAMINATION:  /59   Pulse 76   Temp 98  F (36.7    C) (Oral)   Resp 18   Ht 1.626 m (5' 4\")   Wt 77.1 kg (170   lb)   SpO2 94%   BMI 29.18 kg/m      General appearance:  Patient is 21  Year old woman in no acute   distress.   She was talking on the phone, sitting on the edge of   the bed when they first arrived.  HEENT:  No pallor, icterus, or mucositis.   Lungs:  " Clear to auscultation bilaterally.   Heart:  Regular rate and rhythm; no S3 S4 or murmer.     Abdomen:  Positive bowel sounds, soft and nontender,   nondistended.     Extremities: Right arm held at 90 degrees with some contracture   right wrist.  She has an approximately 5 mm lipoma on the right   forearm near the elbow.  Nontender to palpate.  No ankle edema.     I did not do formal strength testing.  Skin:  No rash, no petechiae or ecchymoses.      Labs:  Results for HIMANSHU AL (MRN [id number]) as of 1/29/2022 11:22   Ref. Range 1/29/2022 05:14 1/29/2022 10:01   WBC Latest Ref Range: 4.0 - 11.0 10e3/uL 14.6 (H) 14.5 (H)   Hemoglobin Latest Ref Range: 11.7 - 15.7 g/dL 7.2 (L) 7.0 (L)   Hematocrit Latest Ref Range: 35.0 - 47.0 % 20.8 (L) 20.8 (L)   Platelet Count Latest Ref Range: 150 - 450 10e3/uL 529 (H) 472   (H)   RBC Count Latest Ref Range: 3.80 - 5.20 10e6/uL 2.46 (L) 2.44 (L)     MCV Latest Ref Range: 78 - 100 fL 85 85   MCH Latest Ref Range: 26.5 - 33.0 pg 29.3 28.7   MCHC Latest Ref Range: 31.5 - 36.5 g/dL 34.6 33.7   RDW Latest Ref Range: 10.0 - 15.0 % 23.8 (H) 23.3 (H)   % Neutrophils Latest Units: % 72 68   % Lymphocytes Latest Units: % 14 20   % Monocytes Latest Units: % 5 7   % Eosinophils Latest Units: % 7 5   % Basophils Latest Units: % 2 0   Absolute Basophils Latest Ref Range: 0.0 - 0.2 10e3/uL 0.3 (H)   0.0   NRBC/W Latest Ref Range: <=0 % 13 (H) 17 (H)   Absolute Neutrophil Latest Ref Range: 1.6 - 8.3 10e3/uL 10.5 (H)   9.9 (H)   Absolute Lymphocytes Latest Ref Range: 0.8 - 5.3 10e3/uL 2.0 2.9   Absolute Monocytes Latest Ref Range: 0.0 - 1.3 10e3/uL 0.7 1.0   Absolute Eosinophils Latest Ref Range: 0.0 - 0.7 10e3/uL 1.0 (H)   0.7   Absolute NRBCs Latest Ref Range: <=0.0 10e3/uL 1.9 (H) 2.5 (H)   RBC Morphology Unknown Confirmed RBC Indices Confirmed RBC   Indices   Platelet Morphology Latest Ref Range: Automated Count Confirmed.   Platelet morphology is normal.  Automated Count Confirmed.    Platelet morphology is normal. Automated Count Confirmed.   Platelet morphology is normal.   Polychromasia Latest Ref Range: None Seen  Moderate (A)    Sickle Cells Latest Ref Range: None Seen  Marked (A) Marked (A)   Target Cells Latest Ref Range: None Seen  Slight (A) Slight (A)   % Retic Latest Ref Range: 0.5 - 2.0 % 14.3 (H)    Absolute Retic Latest Ref Range: 0.025 - 0.095 10e6/uL 0.351 (H)      Results for HIMANSHU AL (MRN [id number]) as of 1/29/2022 11:22   Ref. Range 1/29/2022 05:14   Sodium Latest Ref Range: 133 - 144 mmol/L 139   Potassium Latest Ref Range: 3.4 - 5.3 mmol/L 3.2 (L)   Chloride Latest Ref Range: 94 - 109 mmol/L 112 (H)   Carbon Dioxide Latest Ref Range: 20 - 32 mmol/L 22   Urea Nitrogen Latest Ref Range: 7 - 30 mg/dL 8   Creatinine Latest Ref Range: 0.52 - 1.04 mg/dL 0.66   GFR Estimate Latest Ref Range: >60 mL/min/1.73m2 >90   Calcium Latest Ref Range: 8.5 - 10.1 mg/dL 8.8   Anion Gap Latest Ref Range: 3 - 14 mmol/L 5   Albumin Latest Ref Range: 3.4 - 5.0 g/dL 3.6   Protein Total Latest Ref Range: 6.8 - 8.8 g/dL 7.6   Bilirubin Total Latest Ref Range: 0.2 - 1.3 mg/dL 2.1 (H)   Alkaline Phosphatase Latest Ref Range: 40 - 150 U/L 81   ALT Latest Ref Range: 0 - 50 U/L 40   AST Latest Ref Range: 0 - 45 U/L 34   Bilirubin Direct Latest Ref Range: 0.0 - 0.2 mg/dL 0.5 (H)   CK Total Latest Ref Range: 30 - 225 U/L 43   Lipase Latest Ref Range: 73 - 393 U/L 218   Procalcitonin Latest Ref Range: <0.05 ng/mL <0.05   Troponin I High Sensitivity Latest Ref Range: <54 ng/L <3   Results for HIMANSHU AL (MRN [id number]) as of 1/29/2022 11:22   Ref. Range 1/24/2022 10:56 1/29/2022 05:14   INR Latest Ref Range: 0.85 - 1.15  1.26 (H) 1.24 (H)   PTT Latest Ref Range: 22 - 38 Seconds  34   D-Dimer Quantitative Latest Ref Range: 0.00 - 0.50 ug/mL FEU    3.13 (H)     Assessment and Recommendation:   -Sickle cell crisis, uncomplicated -hemoglobin is at baseline.    No evidence of acute chest syndrome.  No  indication for   transfusion.  I recommend checking for UTI.  For initial day of   admission I recommend following the pain plan as outlined in her   care coordination information.  The primary team can then wean   pain meds as they feel indicated.    -History of DVT, PE, stroke-agree with enoxaparin therapeutic   dose.  Pharmacy for help in adjusting warfarin dose.      Nighat Thornton MD  Hematology     Echo Complete     Status: None   Result Value Ref Range    LVEF  50-55% (borderline)     Narrative    [id number]  [id number]  [id number]  [id number]^LAI^GRACIA     [facility],[place]  Echocardiography Laboratory  [address] [place] [address]     Name: HIMANSHU AL  MRN: [id number]  : 2000  Study Date: 2022 11:03 AM  Age: 21 yrs  Gender: Female  Patient Location: [facility]  Reason For Study: Chest Pain  Ordering Physician: GRACIA HOYT  Performed By: Susi Ramos RDCS     BSA: 1.8 m2  Height: 64 in  Weight: 170 lb  HR: 104  BP: 108/59 mmHg  ______________________________________________________________________________  Procedure  Complete Portable Echo Adult.  ______________________________________________________________________________  Interpretation Summary  Left ventricular function is decreased. The ejection fraction is 50-55%  (borderline).  Right ventricular function, chamber size, wall motion, and thickness are  normal.  No significant valvular abnormalities were noted.  No pericardial effusion is present.  This study was compared with the study from 21 .  No significant changes noted.  ______________________________________________________________________________  Left Ventricle  Left ventricular size is normal. Left ventricular wall thickness is normal.  Left ventricular function is decreased. The ejection fraction is 50-55%  (borderline). Left ventricular diastolic function is normal. Diastolic Doppler  findings (E/E' ratio and/or other  parameters) suggest left ventricular filling  pressures are normal. Abnormal non-specific septal motion is present.     Right Ventricle  Right ventricular function, chamber size, wall motion, and thickness are  normal.     Atria  Both atria appear normal.     Mitral Valve  The mitral valve is normal. Trace mitral insufficiency is present.     Aortic Valve  Aortic valve is normal in structure and function. The aortic valve is  tricuspid.     Tricuspid Valve  The tricuspid valve is normal. Trace tricuspid insufficiency is present. The  peak velocity of the tricuspid regurgitant jet is not obtainable. Pulmonary  artery systolic pressure cannot be assessed.     Pulmonic Valve  The pulmonic valve is normal. Trace pulmonic insufficiency is present.     Vessels  The aorta root is normal. The pulmonary artery cannot be assessed. The  inferior vena cava was normal in size with preserved respiratory variability.  IVC diameter <2.1 cm collapsing >50% with sniff suggests a normal RA pressure  of 3 mmHg.     Pericardium  No pericardial effusion is present.     Compared to Previous Study  This study was compared with the study from 21 . No significant changes  noted.  ______________________________________________________________________________  MMode/2D Measurements & Calculations     IVSd: 0.84 cm  LVIDd: 4.9 cm  LVIDs: 3.5 cm  LVPWd: 0.96 cm  FS: 29.0 %  LV mass(C)d: 151.9 grams  LV mass(C)dI: 83.2 grams/m2  Ao root diam: 2.8 cm  asc Aorta Diam: 2.4 cm  LVOT diam: 2.1 cm  LVOT area: 3.5 cm2  LA Volume (BP): 28.2 ml  LA Volume Index (BP): 15.4 ml/m2  RWT: 0.39     Doppler Measurements & Calculations  MV E max rigo: 113.0 cm/sec  MV dec slope: 921.0 cm/sec2  PA acc time: 0.14 sec  E/E' av.8  Lateral E/e': 7.6  Medial E/e': 9.9     ______________________________________________________________________________  Report approved by: Chava Wagner 2022 06:22 PM         Adult Type and Screen     Status: None    Result Value Ref Range    ABO/RH(D) O POS     Antibody Screen Negative Negative    SPECIMEN EXPIRATION DATE [id number]    Respiratory Panel PCR - NP Swab     Status: Normal    Specimen: Nasopharyngeal; Swab   Result Value Ref Range    Adenovirus Not Detected Not Detected    Coronavirus Not Detected Not Detected    Human Metapneumovirus Not Detected Not Detected    Human Rhin/Enterovirus Not Detected Not Detected    Influenza A Not Detected Not Detected    Influenza A, H1 Not Detected Not Detected    Influenza A 2009 H1N1 Not Detected Not Detected    Influenza A, H3 Not Detected Not Detected    Influenza B Not Detected Not Detected    Parainfluenza Virus 1 Not Detected Not Detected    Parainfluenza Virus 2 Not Detected Not Detected    Parainfluenza Virus 3 Not Detected Not Detected    Parainfluenza Virus 4 Not Detected Not Detected    Respiratory Syncytial Virus A Not Detected Not Detected    Respiratory Syncytial Virus B Not Detected Not Detected    Chlamydia Pneumoniae Not Detected Not Detected    Mycoplasma Pneumoniae Not Detected Not Detected    Narrative    The [facility] Respiratory Viral Panel is a qualitative nucleic acid, multiplex, in vitro diagnostic test for the simultaneous detection and identification of multiple respiratory viral and bacterial nucleic acids in nasopharyngeal swabs collected in viral transport media from individual exhibiting signs and symptoms of respiratory infection. The assay has received FDA approval for the testing of nasopharyngeal (NP) swabs only. This test has been verified and is performed by the [facility] at [facility]. This test is used for clinical purposes and should not be regarded as investigational or for research. This laboratory is certified under the Clinical Laboratory Improvement Amendments of 1988 (CLIA-88) as qualified to perform high complexity clinical laboratory testing.   CBC with platelets differential     Status:  Abnormal    Narrative    The following orders were created for panel order CBC with platelets differential.  Procedure                               Abnormality         Status                     ---------                               -----------         ------                     CBC with platelets and d...[[id number]]  Abnormal            Final result               Manual Differential[[id number]]          Abnormal            Final result                 Please view results for these tests on the individual orders.   ABO/Rh type and screen     Status: None    Narrative    The following orders were created for panel order ABO/Rh type and screen.  Procedure                               Abnormality         Status                     ---------                               -----------         ------                     Adult Type and Screen[[id number]]                            Final result                 Please view results for these tests on the individual orders.   CBC with platelets differential     Status: Abnormal    Narrative    The following orders were created for panel order CBC with platelets differential.  Procedure                               Abnormality         Status                     ---------                               -----------         ------                     CBC with platelets and d...[[id number]]  Abnormal            Final result               Manual Differential[[id number]]          Abnormal            Final result                 Please view results for these tests on the individual orders.     Medications   albuterol (PROVENTIL HFA/VENTOLIN HFA) inhaler (has no administration in time range)   albuterol (PROVENTIL) neb solution 2.5 mg (has no administration in time range)   deferasirox (JADENU) tablet 1,440 mg (1,440 mg Oral Given 1/29/22 9834)   diphenhydrAMINE (BENADRYL) capsule 25-50 mg (has no administration in time range)   hydroxyurea (HYDREA) capsule 3,000 mg (3,000 mg Oral Given  1/29/22 1209)   oxyCODONE IR (ROXICODONE) tablet 20 mg (has no administration in time range)   lidocaine 1 % 1 mL (has no administration in time range)   lidocaine (LMX4) cream (has no administration in time range)   sodium chloride (PF) 0.9% PF flush 3 mL (has no administration in time range)   sodium chloride (PF) 0.9% PF flush 3 mL (3 mLs Intracatheter Not Given 1/29/22 1901)   melatonin tablet 6 mg (has no administration in time range)   alum & mag hydroxide-simethicone (MAALOX) suspension 30 mL (has no administration in time range)   polyethylene glycol (MIRALAX) Packet 17 g (17 g Oral Not Given 1/29/22 1203)   senna-docusate (SENOKOT-S/PERICOLACE) 8.6-50 MG per tablet 1 tablet (has no administration in time range)     Or   senna-docusate (SENOKOT-S/PERICOLACE) 8.6-50 MG per tablet 2 tablet (has no administration in time range)   magnesium hydroxide (MILK OF MAGNESIA) suspension 30 mL (has no administration in time range)   ondansetron (ZOFRAN-ODT) ODT tab 4 mg (has no administration in time range)     Or   ondansetron (ZOFRAN) injection 4 mg (has no administration in time range)   morphine (MS CONTIN) 12 hr tablet 15 mg (15 mg Oral Given 1/29/22 2059)   Warfarin Therapy Reminder (Check START DATE - warfarin may be starting in the FUTURE) (has no administration in time range)   enoxaparin ANTICOAGULANT (LOVENOX) injection 80 mg (80 mg Subcutaneous Given 1/29/22 1851)   acetaminophen (TYLENOL) tablet 975 mg (975 mg Oral Given 1/29/22 2058)   ketorolac (TORADOL) injection 30 mg (30 mg Intravenous Given 1/30/22 0012)   fluticasone-vilanterol (BREO ELLIPTA) 200-25 MCG/INH inhaler 1 puff (1 puff Inhalation Given 1/29/22 1358)   ketamine (KETALAR) 2 mg/mL in sodium chloride 0.9 % 50 mL ANALGESIA infusion (4 mg/hr Intravenous Rate/Dose Verify 1/30/22 0010)   sodium chloride 0.9% infusion ( Intravenous New Bag 1/30/22 0005)   naloxone (NARCAN) injection 0.2 mg (has no administration in time range)     Or   naloxone  (NARCAN) injection 0.4 mg (has no administration in time range)     Or   naloxone (NARCAN) injection 0.2 mg (has no administration in time range)     Or   naloxone (NARCAN) injection 0.4 mg (has no administration in time range)   dextrose 5% and 0.45% NaCl BOLUS (0 mLs Intravenous Stopped 1/29/22 0624)   oxyCODONE IR (ROXICODONE) tablet 20 mg (20 mg Oral Given 1/29/22 0424)   ketamine (KETALAR) injection 5 mg (5 mg Intravenous Given 1/29/22 0541)   ketorolac (TORADOL) injection 15 mg (15 mg Intravenous Given 1/29/22 0545)   cefTRIAXone (ROCEPHIN) 1 g vial to attach to  mL bag for ADULTS or NS 50 mL bag for PEDS (0 g Intravenous Stopped 1/29/22 0659)   enoxaparin ANTICOAGULANT (LOVENOX) injection 80 mg (80 mg Subcutaneous Given 1/29/22 0656)   potassium chloride (KLOR-CON) Packet 40 mEq (40 mEq Oral Given 1/29/22 0619)   ketamine (KETALAR) injection 4 mg (4 mg Intravenous Given 1/29/22 0955)   warfarin ANTICOAGULANT (COUMADIN) tablet 20 mg (20 mg Oral Given 1/29/22 1851)        Assessments & Plan (with Medical Decision Making)   21-year-old female with 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    IV established  - IVF, oral opiates, IV ketamine, antiemetics.  - pain management per protocol.       ECG w/o acute ischemia  Chest x-ray shows no acute infiltrates   Labs notable for WBC 15 and elevated retic, similar to prior flairs.     Given chest pain, concern for acute chest syndrome exists.  no hypoxia and pt non toxic appearing.   - Ceftriaxone for possible pna  - NM scan for PE exclusion ordered and pending    Discussed with IM, admitted for further management.     I have reviewed the nursing notes. I have reviewed the findings, diagnosis, plan and need for follow up with the patient.    Current Discharge  Medication List          Final diagnoses:   Pleuritic chest pain   Subtherapeutic anticoagulation   Sickle cell pain crisis (H)       --  Deo Schmid MD    [facility] EMERGENCY DEPARTMENT  1/29/2022     Deo Schmid MD  01/30/22 0056

## 2022-01-29 NOTE — CONSULTS
"Hematology consult note    Reasons for Consult: -Sickle cell pain crisis    History of Present Illness: Ms. Cervantes is a 21-year-old woman with hemoglobin SS and frequent pain crises, history of stroke with right upper extremity hemiparesis, presents with sickle cell pain.  She had visits to clinic for IV opioids on 1/24 and 1/25/2022.  She has been having chest pain, back and side pain for about a week.  When I saw her, she said the chest pain was gone, but still having some back pain.  She is asking whether ketamine infusion will be started.  The INR was subtherapeutic on admission.  Therefore enoxaparin was started.  She reports she has not missed any doses of warfarin.    Past Medical History:  -Hemoglobin SS with frequent pain crises  -History of stroke with right upper extremity hemiparesis  -Iron overload secondary to transfusion  -Anxiety and depression  -Asthma  -History of pulmonary embolism and right upper extremity DVT.  Over the past 1 to 2 years she has been on apixaban, dabigatran plus aspirin, and currently on warfarin.    Medications:  -Includes hydroxyurea 3000 mg daily, warfarin, deferasirox, MS Contin as an outpatient.  -Inpatient has been switched to enoxaparin, received ceftriaxone.  Receiving ketamine, oxycodone, and Toradol as per her hematologist recommendation..    Social History: smoking-never, alcohol-no.  She lives with her mother and extended family.    Review of systems:  As in HPI.  The rest of the > 10 point review of systems was negative.      PHYSICAL EXAMINATION:  /59   Pulse 76   Temp 98  F (36.7  C) (Oral)   Resp 18   Ht 1.626 m (5' 4\")   Wt 77.1 kg (170 lb)   SpO2 94%   BMI 29.18 kg/m      General appearance:  Patient is 21  Year old woman in no acute distress.   She was talking on the phone, sitting on the edge of the bed when they first arrived.  HEENT:  No pallor, icterus, or mucositis.   Lungs:  Clear to auscultation bilaterally.   Heart:  Regular rate and rhythm; " no S3 S4 or murmer.     Abdomen:  Positive bowel sounds, soft and nontender, nondistended.     Extremities: Right arm held at 90 degrees with some contracture right wrist.  She has an approximately 5 mm lipoma on the right forearm near the elbow.  Nontender to palpate.  No ankle edema.   I did not do formal strength testing.  Skin:  No rash, no petechiae or ecchymoses.      Labs:  Results for HIMANSHU AL (MRN [id number]) as of 1/29/2022 11:22   Ref. Range 1/29/2022 05:14 1/29/2022 10:01   WBC Latest Ref Range: 4.0 - 11.0 10e3/uL 14.6 (H) 14.5 (H)   Hemoglobin Latest Ref Range: 11.7 - 15.7 g/dL 7.2 (L) 7.0 (L)   Hematocrit Latest Ref Range: 35.0 - 47.0 % 20.8 (L) 20.8 (L)   Platelet Count Latest Ref Range: 150 - 450 10e3/uL 529 (H) 472 (H)   RBC Count Latest Ref Range: 3.80 - 5.20 10e6/uL 2.46 (L) 2.44 (L)   MCV Latest Ref Range: 78 - 100 fL 85 85   MCH Latest Ref Range: 26.5 - 33.0 pg 29.3 28.7   MCHC Latest Ref Range: 31.5 - 36.5 g/dL 34.6 33.7   RDW Latest Ref Range: 10.0 - 15.0 % 23.8 (H) 23.3 (H)   % Neutrophils Latest Units: % 72 68   % Lymphocytes Latest Units: % 14 20   % Monocytes Latest Units: % 5 7   % Eosinophils Latest Units: % 7 5   % Basophils Latest Units: % 2 0   Absolute Basophils Latest Ref Range: 0.0 - 0.2 10e3/uL 0.3 (H) 0.0   NRBC/W Latest Ref Range: <=0 % 13 (H) 17 (H)   Absolute Neutrophil Latest Ref Range: 1.6 - 8.3 10e3/uL 10.5 (H) 9.9 (H)   Absolute Lymphocytes Latest Ref Range: 0.8 - 5.3 10e3/uL 2.0 2.9   Absolute Monocytes Latest Ref Range: 0.0 - 1.3 10e3/uL 0.7 1.0   Absolute Eosinophils Latest Ref Range: 0.0 - 0.7 10e3/uL 1.0 (H) 0.7   Absolute NRBCs Latest Ref Range: <=0.0 10e3/uL 1.9 (H) 2.5 (H)   RBC Morphology Unknown Confirmed RBC Indices Confirmed RBC Indices   Platelet Morphology Latest Ref Range: Automated Count Confirmed. Platelet morphology is normal.  Automated Count Confirmed. Platelet morphology is normal. Automated Count Confirmed. Platelet morphology is normal.    Polychromasia Latest Ref Range: None Seen  Moderate (A)    Sickle Cells Latest Ref Range: None Seen  Marked (A) Marked (A)   Target Cells Latest Ref Range: None Seen  Slight (A) Slight (A)   % Retic Latest Ref Range: 0.5 - 2.0 % 14.3 (H)    Absolute Retic Latest Ref Range: 0.025 - 0.095 10e6/uL 0.351 (H)    Results for HIMANSHU AL (MRN [id number]) as of 1/29/2022 11:22   Ref. Range 1/29/2022 05:14   Sodium Latest Ref Range: 133 - 144 mmol/L 139   Potassium Latest Ref Range: 3.4 - 5.3 mmol/L 3.2 (L)   Chloride Latest Ref Range: 94 - 109 mmol/L 112 (H)   Carbon Dioxide Latest Ref Range: 20 - 32 mmol/L 22   Urea Nitrogen Latest Ref Range: 7 - 30 mg/dL 8   Creatinine Latest Ref Range: 0.52 - 1.04 mg/dL 0.66   GFR Estimate Latest Ref Range: >60 mL/min/1.73m2 >90   Calcium Latest Ref Range: 8.5 - 10.1 mg/dL 8.8   Anion Gap Latest Ref Range: 3 - 14 mmol/L 5   Albumin Latest Ref Range: 3.4 - 5.0 g/dL 3.6   Protein Total Latest Ref Range: 6.8 - 8.8 g/dL 7.6   Bilirubin Total Latest Ref Range: 0.2 - 1.3 mg/dL 2.1 (H)   Alkaline Phosphatase Latest Ref Range: 40 - 150 U/L 81   ALT Latest Ref Range: 0 - 50 U/L 40   AST Latest Ref Range: 0 - 45 U/L 34   Bilirubin Direct Latest Ref Range: 0.0 - 0.2 mg/dL 0.5 (H)   CK Total Latest Ref Range: 30 - 225 U/L 43   Lipase Latest Ref Range: 73 - 393 U/L 218   Procalcitonin Latest Ref Range: <0.05 ng/mL <0.05   Troponin I High Sensitivity Latest Ref Range: <54 ng/L <3   Results for HIMANSHU AL (MRN [id number]) as of 1/29/2022 11:22   Ref. Range 1/24/2022 10:56 1/29/2022 05:14   INR Latest Ref Range: 0.85 - 1.15  1.26 (H) 1.24 (H)   PTT Latest Ref Range: 22 - 38 Seconds  34   D-Dimer Quantitative Latest Ref Range: 0.00 - 0.50 ug/mL FEU  3.13 (H)     Assessment and Recommendation:   -Sickle cell crisis, uncomplicated -hemoglobin is at baseline.  No evidence of acute chest syndrome.  No indication for transfusion.  I recommend checking for UTI.  For initial day of admission I  recommend following the pain plan as outlined in her care coordination information.  The primary team can then wean pain meds as they feel indicated.    -History of DVT, PE, stroke-agree with enoxaparin therapeutic dose.  Pharmacy for help in adjusting warfarin dose.      Nighat Thornton MD  Hematology

## 2022-01-29 NOTE — PLAN OF CARE
Admitted/transferred from:   Time of arrival on unit 4154  2 RN full  skin assessment completed by La Rivera.   Skin assessment finding: skin intact, no problems   Interventions/actions: other.      Will continue to monitor.

## 2022-01-29 NOTE — ED TRIAGE NOTES
Pt comes ambulatory to triage from home.   Patient states she is having pain from sickle cell and has had chest pain for a week now. She has been trying to deal with this pain at home home, but no medication is helping at this time. The sickle cell pain is in her lower back. Pain felt in chest, back, and side. Every time she takes a deep breath the pain feels sharp.  VSS

## 2022-01-29 NOTE — PHARMACY-ANTICOAGULATION SERVICE
Clinical Pharmacy - Warfarin Dosing Consult     Pharmacy has been consulted to manage this patient s warfarin therapy.  Indication: DVT/ PE Treatment  Therapy Goal: INR 2-3  Warfarin Prior to Admission: Yes  Warfarin PTA Regimen: 30 mg on Wed, Sat and 20 mg all other days  Recent documented change in oral intake/nutrition: No  Dose Comments: Although she is subtherapeutic and reports compliance I am too hesitant to give her 30 mg per her home regimen today.  In a past hospitalization she was fully therapeutic with a combination of 5 mg and 7.5 mg doses.  Her dose has been escalated significantly based on subtherapeutic INR.  Will give the 20 mg dose today to see response of INR with a witnessed administration.    INR   Date Value Ref Range Status   01/29/2022 1.24 (H) 0.85 - 1.15 Final   01/24/2022 1.26 (H) 0.85 - 1.15 Final       Recommend warfarin 20 mg today.  Pharmacy will monitor Jennifer I Billy daily and order warfarin doses to achieve specified goal.      Please contact pharmacy as soon as possible if the warfarin needs to be held for a procedure or if the warfarin goals change.      Court Andino, PharmD, BCPS

## 2022-01-29 NOTE — H&P
[facility]      Physician Attestation     I, Suraj Aguillon, saw and evaluated Jennifer Cervantes as part of a shared APRN/PA visit.     I personally reviewed the vital signs, medications, labs and imaging.    I personally performed the substantive portion of the medical decision making for this visit - please see the ANDREAS's documentation for full details.      Key management decisions made by me and carried out under my direction: Pt admitted with chest pain, subtherapeutic INR, in the background of a possible sickle cell pain crisis.  Will attempt to rule out a PE with a VQ scan, and discuss with pharmacy her coumadin dosing.     I personally performed the substantive portion of the history for this visit - please see the ANDREAS's documentation for full details.    Key additional history findings made by me: Pt with acute pleuritic chest pain, per patient this is similar to prior PEs, though she does not feel more short of breath.  Notes she has been taking all medications as prescribed.     I personally performed the substantive portion of the physical exam - please see the ANDREAS's documentation for full details.    I concur with the ANDREAS exam findings, in addition I have noted: Pt with RRR, no tachycardia and clear lung sigala.      Suraj Aguillon  Date of Service (when I saw the patient): 1/29/22    History and Physical - Hospitalist Service, GOLD TEAM 7       Date of Admission:  1/29/2022    Assessment & Plan      Jennifer Cervantes is a 21 year old female with PMHx of HbSS c/b frequent pain crises, hx of CVA c/b cognitive delay and RUE hemiparesis, hx of acute chest syndrome, iron overload 2/2 chronic transfusion, recurrent and progressive PE initially dz on 2/1/2021 previously on DOAC, but ultimately transitioned to coumadin, anxiety, depression, and Asthma admitted on 1/29/2022 for acute sickle cell pain crisis, with pleuritic chest pain in setting of  subtherapeutic INR.     # Chest pain - Presenting with pleuritic chest pain for the last week. Without any respiratory symptoms or dyspnea. EKG with sinus tachycardia without any acute ischemic changes. Troponin negative. CXR clear. Mild leukocytosis. Negative COVID 19 PCR. With subtherapeutic INR, will need to rule out another PE with VQ scan. With recent URI symptoms and COVID exposure several weeks ago, possibly pericarditis. CP not reproducible on exam, less likely musculoskeletal or costochondritis.  - Pain management as noted below   - VQ scan, TTE, NT-proBNP, RVP, blood cultures, LFTs and lipase   - Monitor for any hypoxia, with hx of acute chest syndrome   - Telemetry, pulse oximetry     # Hx of PE   # Subtherapeutic INR -  Initially dx with PE on 2/1/2021, and initially treated with DOACs (first on rivaroxaban, then apixaban after patient developed DVT, then rivaroxaban again, and then dabigatran with ASA), but patient kept having recurrent DVT/PE. Patient transitioned to coumadin on 11/2021 after found to have another acute on chronic PE on DOAC + ASA. Patient reports compliance. Presenting with pleuritic CP on admission with subtherapeutic INR at 1.24.   - Pharmacy consult to dose coumadin  - Lovenox bridge, 1 mg/kg BID until INR therapeutic   - VQ scan as noted above     # HbSS with possibly acute pain crisis   # Hx CVA with RUE hemiparesis from HbSS  Baseline hgb 6.0-7.0s. Currently at baseline. Reticulocyte count up slightly from baseline.    - Pain management per pain plan: Continue PTA MS contin 15 mg BID. Increased PTA oxycodone IR, from 15 mg to 20 mg Q4H PRN. If pain uncontrolled, will add ketamine drip 4 mg /hr   - Add tylenol 975 mg TID, ketolorac 30 mg Q6H   - Hematology consult   - Continue PTA Hydrea 3000 mg daily   - Trend CBC and reticulocyte count   - Bowel regimen   - Benadryl PO PRN For itching   - Zofran PRN   - IS, pulmonary toilet, encourage ambulation   - Infectious work up for  trigger with blood cultures, UA, RVP with new leukocytosis     # Hypokalemia - Replete per nursing sliding scale.     # Iron overload - continue PTA jadenu 1400 mg daliy     # Asthma - Continue PTA Symbicort and albuterol     # Depression, anxiety - No longer on medications.        Diet: Regular Diet Adult    DVT Prophylaxis: Enoxaparin (Lovenox) subcutaneous bridge coumadin   Lopez Catheter: Not present  Central Lines: PRESENT       Cardiac Monitoring: None  Code Status:    FULL CODE     Clinically Significant Risk Factors Present on Admission             # Coagulation Defect: home medication list includes an anticoagulant medication    # Overweight: last Body mass index is 29.18 kg/m .      Disposition Plan   Expected Discharge:  TBD  Anticipated discharge location:  Awaiting care coordination huddle  Delays:           The patient's care was discussed with the Attending Physician, Dr. Suraj Aguillon, Bedside Nurse and Patient.    Chhaya Vela PA-C  Hospitalist Service, [facility] TEAM [address] [place] [address]  Securely message with the Vocera Web Console (learn more here)  Text page via [employer] Paging/Directory   Please see signed in provider for up to date coverage information      ______________________________________________________________________    Chief Complaint   Chest pain     History is obtained from the patient    History of Present Illness   Jennifer Cervantes is a 21 year old female with PMHx of HbSS c/b frequent pain crises, hx of CVA c/b cognitive delay and RUE hemiparesis, hx of acute chest syndrome, iron overload 2/2 chronic transfusion, recurrent and progressive PE initially dz on 2/1/2021 previously on DOAC, but ultimately transitioned to coumadin, anxiety, depression, and Asthma admitted on 1/29/2022 for acute sickle cell pain crisis, with pleuritic chest pain in setting of subtherapeutic INR.     Patient report chronic pain in back 2/2 sick cell pain, but over the  last week she developed chest pain, left sided, described as sharp and pleuritic that radiates to her back. States her home pain medications do not help her chest pain (tylenol, OxyContin, oxycodone etc). Denies any associated SOB, cough, or fevers. Denies any positional component of symptoms. States she has never had this type of pain before and not consisted with prior PE or sickle cell pain crisis. States she has been taking her coumadin.     Denies any N/V/D, abdominal pain, dysuria, or hematuria. States her mother was diagnosed with COVID right after [place], and despite quarantine, Jennifer reports approximately 2-3 weeks ago, developing a cough with loss of smell for 3 days, and then symptoms resolved.  She did not get testes for COVID. She is not vaccinated due to concerns for side effects of vaccine.     Review of Systems    The 10 point Review of Systems is negative other than noted in the HPI or here.     Past Medical History    I have reviewed this patient's medical history and updated it with pertinent information if needed.   Past Medical History:   Diagnosis Date     Anxiety      Bleeding disorder (H)      Blood clotting disorder (H)      Cerebral infarction (H) 2015     Cognitive developmental delay     low IQ. Please recognize when managing pain and planning with her     Depressive disorder      Hemiplegia and hemiparesis following cerebral infarction affecting right dominant side (H)     right hand contractures     Iron overload due to repeated red blood cell transfusions      Migraines      Multiple subsegmental pulmonary emboli without acute cor pulmonale (H) 02/01/2021     Oppositional defiant behavior      Superficial venous thrombosis of arm, right 03/25/2021     Uncomplicated asthma        Past Surgical History   I have reviewed this patient's surgical history and updated it with pertinent information if needed.  Past Surgical History:   Procedure Laterality Date     AS INSERT TUNNELED CV 2  CATH W/O PORT/PUMP       CHOLECYSTECTOMY       CV RIGHT HEART CATH MEASUREMENTS RECORDED N/A 11/18/2021    Procedure: Right Heart Cath;  Surgeon: Jackson Stauffer MD;  Location:  HEART CARDIAC CATH LAB     INSERT PORT VASCULAR ACCESS Left 4/21/2021    Procedure: INSERTION, VASCULAR ACCESS PORT (NOT SURE ON SIDE UNTIL REMOVAL);  Surgeon: Rajan More MD;  Location: [facility] OR     IR CHEST PORT PLACEMENT > 5 YRS OF AGE  4/21/2021     IR CVC NON TUNNEL LINE REMOVAL  5/6/2021     IR CVC NON TUNNEL PLACEMENT  04/07/2020     IR CVC NON TUNNEL PLACEMENT  4/30/2021     IR PORT REMOVAL LEFT  4/21/2021     REMOVE PORT VASCULAR ACCESS Left 4/21/2021    Procedure: REMOVAL, VASCULAR ACCESS PORT LEFT;  Surgeon: Rajan More MD;  Location: [facility] OR     REPAIR TENDON ELBOW Right 10/02/2019    Procedure: Right Elbow Flexor Lengthening, Flexor Pronator Slide Of Wrist and Finger, Thumb Adductor Lengthening;  Surgeon: Anai Franco MD;  Location: [facility] OR     TONSILLECTOMY Bilateral 10/02/2019    Procedure: Bilateral Tonsillectomy;  Surgeon: Farhana Guy MD;  Location: [facility] OR     ZZC BREAST REDUCTION (INCLUDES LIPO) TIER 3 Bilateral 04/23/2019       Social History   I have reviewed this patient's social history and updated it with pertinent information if needed.  Social History     Tobacco Use     Smoking status: Never Smoker     Smokeless tobacco: Never Used   Substance Use Topics     Alcohol use: Not Currently     Alcohol/week: 0.0 standard drinks     Drug use: Never       Family History   I have reviewed this patient's family history and updated it with pertinent information if needed.  Family History   Problem Relation Age of Onset     Sickle Cell Trait Mother      Hypertension Mother      Asthma Mother      Sickle Cell Trait Father        Prior to Admission Medications   Prior to Admission Medications   Prescriptions Last Dose Informant Patient Reported? Taking?   EPINEPHrine (ANY BX GENERIC EQUIV) 0.3  MG/0.3ML injection 2-pack  at prn Self No Yes   Sig: Inject 0.3 mLs (0.3 mg) into the muscle as needed for anaphylaxis   Hydroxyurea 1000 MG TABS 1/28/2022 at Unknown time Self No Yes   Sig: Take 3,000 mg by mouth daily   acetaminophen (TYLENOL) 325 MG tablet  at prn Self No Yes   Sig: Take 2 tablets (650 mg) by mouth every 6 hours as needed for mild pain   albuterol (PROAIR HFA/PROVENTIL HFA/VENTOLIN HFA) 108 (90 Base) MCG/ACT inhaler  at prn Self No Yes   Sig: Inhale 2 puffs into the lungs every 6 hours as needed for shortness of breath / dyspnea or wheezing   albuterol (PROVENTIL) (2.5 MG/3ML) 0.083% neb solution  at prn Self No Yes   Sig: Take 1 vial (2.5 mg) by nebulization every 6 hours as needed for shortness of breath / dyspnea or wheezing   budesonide-formoterol (SYMBICORT) 160-4.5 MCG/ACT Inhaler 1/28/2022 at Unknown time Self No Yes   Sig: Inhale 2 puffs into the lungs 2 times daily   deferasirox (JADENU) 360 MG tablet 1/28/2022 at Unknown time Self Yes Yes   Sig: Take 4 tablets (1,440 mg) by mouth every evening   diphenhydrAMINE (BENADRYL) 25 MG capsule 1/28/2022 at Unknown time Self No Yes   Sig: Take 1-2 capsules (25-50 mg) by mouth nightly as needed for sleep   lidocaine-prilocaine (EMLA) 2.5-2.5 % external cream   No No   Sig: Apply topically once for 1 dose Use for port site as needed   Patient not taking: Reported on 11/10/2021   medroxyPROGESTERone (DEPO-PROVERA) 150 MG/ML IM injection   Yes No   Sig: Inject 150 mg into the muscle   morphine (MS CONTIN) 15 MG CR tablet 1/28/2022 at Unknown time Self No Yes   Sig: Take 1 tablet (15 mg) by mouth every 12 hours   naloxone (NARCAN) 4 MG/0.1ML nasal spray  at prn Self No No   Sig: Spray 1 spray (4 mg) into one nostril alternating nostrils once as needed for opioid reversal every 2-3 minutes until assistance arrives   ondansetron (ZOFRAN) 8 MG tablet  at prn Self No Yes   Sig: Take 1 tablet (8 mg) by mouth every 8 hours as needed   oxyCODONE IR  (ROXICODONE) 15 MG tablet  at prn Self No Yes   Sig: Take 1 tablet (15 mg) by mouth every 4 hours as needed for severe pain Goal 4 per day. Max 6 per day.   warfarin ANTICOAGULANT (COUMADIN) 5 MG tablet  Self No No   Sig: Take 2 to 3 tablets by mouth daily or as directed by the Coumadin clinic.   warfarin ANTICOAGULANT (COUMADIN) 5 MG tablet 1/28/2022 at Unknown time Self No Yes   Sig: Take 4 to 6 tablets daily or as directed by the Coumadin clinic.   warfarin ANTICOAGULANT (COUMADIN) 7.5 MG tablet  Self No No   Sig: Take 1 tablet (7.5 mg) by mouth three times a week Take 7.5 mg on Mon, Wed, Fri. INR check on Tues 11/23 so dose can change.      Facility-Administered Medications Last Administration Doses Remaining   medroxyPROGESTERone (DEPO-PROVERA) syringe 150 mg 11/26/2021  2:06 PM 1        Allergies   Allergies   Allergen Reactions     Contrast Dye      Hives and breathing issues     Fish-Derived Products Hives     Seafood Hives     Diagnostic X-Ray Materials      Gadolinium        Physical Exam   Vital Signs: Temp: 98.6  F (37  C) Temp src: Oral BP: 123/74 Pulse: 101   Resp: 17 SpO2: 92 % O2 Device: None (Room air)    Weight: 170 lbs 0 oz  GENERAL: Alert and oriented x 3. NAD. Pleasant and conversational   HEENT: Anicteric sclera. Mucous membranes moist   CARDIOVASCULAR: RRR. S1, S2. No murmurs, rubs, or gallops. Chest pain not reproducible on exam.   RESPIRATORY: Effort normal on RA. Clear to auscultation bilaterally, no rales, rhonchi or wheezes, respirations unlabored   GI: Abdomen soft, non-tender abdomen without rebound or guarding, normoactive bowel sounds present  EXTREMITIES: No peripheral edema.  NEUROLOGICAL:  CN II-XII grossly intact. RUE hemiparesis with contractures.   SKIN: Intact. Warm and dry.  No jaundice. Port in place on L sided anterior chest wall.     Data   Data reviewed today: I reviewed all medications, new labs and imaging results over the last 24 hours. I personally reviewed the EKG  tracing showing sinus tachycardia at 102 bpm. Qtc 462. No acute ischemic changes. .    Recent Labs   Lab 01/29/22  1001 01/29/22  0514 01/24/22  1056 01/23/22  0205   WBC 14.5* 14.6*  --  7.2   HGB 7.0* 7.2*  --  6.8*   MCV 85 85  --  87   * 529*  --  503*   INR  --  1.24* 1.26*  --    NA  --  139  --  138   POTASSIUM  --  3.2*  --  3.2*   CHLORIDE  --  112*  --  112*   CO2  --  22  --  19*   BUN  --  8  --  8   CR  --  0.66  --  0.52   ANIONGAP  --  5  --  7   MICAH  --  8.8  --  8.8   GLC  --  100*  --  105*   ALBUMIN  --  3.6  --  4.0   PROTTOTAL  --  7.6  --  8.2   BILITOTAL  --  2.1*  --  1.9*   ALKPHOS  --  81  --  72   ALT  --  40  --  33   AST  --  34  --  29   LIPASE  --  218  --   --      Recent Results (from the past 24 hour(s))   POC US ECHO LIMITED    Impression    Limited Bedside Cardiac Ultrasound, performed and interpreted by me.   Indication: Chest Pain.  Parasternal long axis, parasternal short axis and apical 4 chamber views were acquired.   Image quality was satisfactory.    Findings:    Global left ventricular function appears intact.  Chambers do not appear dilated.  There is no evidence of free fluid within the pericardium.    IMPRESSION: Grossly normal left ventricular function and chamber size.  No pericardial effusion..     XR Chest 2 Views    Narrative    EXAM: XR CHEST 2 VW  LOCATION: [facility]  DATE/TIME: 1/29/2022 4:14 AM    INDICATION: chest pain  COMPARISON: 09/20/2021      Impression    IMPRESSION: Left-sided port with tip over atrial caval junction. No pneumothorax or pleural effusion. No focal consolidation. Cardiac silhouette within normal limits. Right upper quadrant cholecystectomy clips.

## 2022-01-30 LAB
ALBUMIN SERPL-MCNC: 3 G/DL (ref 3.4–5)
ALP SERPL-CCNC: 75 U/L (ref 40–150)
ALT SERPL W P-5'-P-CCNC: 37 U/L (ref 0–50)
ANION GAP SERPL CALCULATED.3IONS-SCNC: 6 MMOL/L (ref 3–14)
AST SERPL W P-5'-P-CCNC: 35 U/L (ref 0–45)
ATRIAL RATE - MUSE: 102 BPM
BILIRUB DIRECT SERPL-MCNC: 0.4 MG/DL (ref 0–0.2)
BILIRUB SERPL-MCNC: 1.7 MG/DL (ref 0.2–1.3)
BUN SERPL-MCNC: 9 MG/DL (ref 7–30)
CALCIUM SERPL-MCNC: 8.5 MG/DL (ref 8.5–10.1)
CHLORIDE BLD-SCNC: 115 MMOL/L (ref 94–109)
CO2 SERPL-SCNC: 21 MMOL/L (ref 20–32)
CREAT SERPL-MCNC: 0.62 MG/DL (ref 0.52–1.04)
DIASTOLIC BLOOD PRESSURE - MUSE: NORMAL MMHG
ERYTHROCYTE [DISTWIDTH] IN BLOOD BY AUTOMATED COUNT: 24.1 % (ref 10–15)
GFR SERPL CREATININE-BSD FRML MDRD: >90 ML/MIN/1.73M2
GLUCOSE BLD-MCNC: 96 MG/DL (ref 70–99)
HCT VFR BLD AUTO: 18.7 % (ref 35–47)
HGB BLD-MCNC: 6.4 G/DL (ref 11.7–15.7)
INR PPP: 1.23 (ref 0.85–1.15)
INTERPRETATION ECG - MUSE: NORMAL
MCH RBC QN AUTO: 28.8 PG (ref 26.5–33)
MCHC RBC AUTO-ENTMCNC: 34.2 G/DL (ref 31.5–36.5)
MCV RBC AUTO: 84 FL (ref 78–100)
P AXIS - MUSE: 48 DEGREES
PLAT MORPH BLD: ABNORMAL
PLATELET # BLD AUTO: 528 10E3/UL (ref 150–450)
POTASSIUM BLD-SCNC: 3.8 MMOL/L (ref 3.4–5.3)
PR INTERVAL - MUSE: 146 MS
PROT SERPL-MCNC: 6.7 G/DL (ref 6.8–8.8)
QRS DURATION - MUSE: 70 MS
QT - MUSE: 356 MS
QTC - MUSE: 463 MS
R AXIS - MUSE: 20 DEGREES
RBC # BLD AUTO: 2.22 10E6/UL (ref 3.8–5.2)
RBC MORPH BLD: ABNORMAL
RETICS # AUTO: 0.3 10E6/UL (ref 0.03–0.1)
RETICS/RBC NFR AUTO: 13.8 % (ref 0.5–2)
SICKLE CELLS BLD QL SMEAR: ABNORMAL
SODIUM SERPL-SCNC: 142 MMOL/L (ref 133–144)
SYSTOLIC BLOOD PRESSURE - MUSE: NORMAL MMHG
T AXIS - MUSE: 5 DEGREES
TARGETS BLD QL SMEAR: SLIGHT
VENTRICULAR RATE- MUSE: 102 BPM
WBC # BLD AUTO: 11.8 10E3/UL (ref 4–11)

## 2022-01-30 PROCEDURE — 85045 AUTOMATED RETICULOCYTE COUNT: CPT | Performed by: PHYSICIAN ASSISTANT

## 2022-01-30 PROCEDURE — 80053 COMPREHEN METABOLIC PANEL: CPT | Performed by: PHYSICIAN ASSISTANT

## 2022-01-30 PROCEDURE — 99231 SBSQ HOSP IP/OBS SF/LOW 25: CPT | Mod: GC | Performed by: INTERNAL MEDICINE

## 2022-01-30 PROCEDURE — 120N000011 HC R&B TRANSPLANT UMMC

## 2022-01-30 PROCEDURE — 250N000011 HC RX IP 250 OP 636: Performed by: PHYSICIAN ASSISTANT

## 2022-01-30 PROCEDURE — 99232 SBSQ HOSP IP/OBS MODERATE 35: CPT | Performed by: INTERNAL MEDICINE

## 2022-01-30 PROCEDURE — 82248 BILIRUBIN DIRECT: CPT | Performed by: PHYSICIAN ASSISTANT

## 2022-01-30 PROCEDURE — 250N000013 HC RX MED GY IP 250 OP 250 PS 637: Performed by: PHYSICIAN ASSISTANT

## 2022-01-30 PROCEDURE — 258N000003 HC RX IP 258 OP 636: Performed by: INTERNAL MEDICINE

## 2022-01-30 PROCEDURE — 36591 DRAW BLOOD OFF VENOUS DEVICE: CPT | Performed by: PHYSICIAN ASSISTANT

## 2022-01-30 PROCEDURE — 85610 PROTHROMBIN TIME: CPT | Performed by: PHYSICIAN ASSISTANT

## 2022-01-30 PROCEDURE — 250N000013 HC RX MED GY IP 250 OP 250 PS 637: Performed by: INTERNAL MEDICINE

## 2022-01-30 PROCEDURE — 85014 HEMATOCRIT: CPT | Performed by: PHYSICIAN ASSISTANT

## 2022-01-30 RX ORDER — WARFARIN SODIUM 10 MG/1
30 TABLET ORAL
Status: COMPLETED | OUTPATIENT
Start: 2022-01-30 | End: 2022-01-30

## 2022-01-30 RX ADMIN — KETOROLAC TROMETHAMINE 30 MG: 30 INJECTION, SOLUTION INTRAMUSCULAR at 12:30

## 2022-01-30 RX ADMIN — SODIUM CHLORIDE: 9 INJECTION, SOLUTION INTRAVENOUS at 09:32

## 2022-01-30 RX ADMIN — ACETAMINOPHEN 975 MG: 325 TABLET, FILM COATED ORAL at 08:20

## 2022-01-30 RX ADMIN — ONDANSETRON 4 MG: 2 INJECTION INTRAMUSCULAR; INTRAVENOUS at 08:37

## 2022-01-30 RX ADMIN — KETOROLAC TROMETHAMINE 30 MG: 30 INJECTION, SOLUTION INTRAMUSCULAR at 18:14

## 2022-01-30 RX ADMIN — MORPHINE SULFATE 15 MG: 15 TABLET, EXTENDED RELEASE ORAL at 20:13

## 2022-01-30 RX ADMIN — ENOXAPARIN SODIUM 80 MG: 80 INJECTION SUBCUTANEOUS at 08:20

## 2022-01-30 RX ADMIN — MORPHINE SULFATE 15 MG: 15 TABLET, EXTENDED RELEASE ORAL at 08:30

## 2022-01-30 RX ADMIN — KETOROLAC TROMETHAMINE 30 MG: 30 INJECTION, SOLUTION INTRAMUSCULAR at 00:12

## 2022-01-30 RX ADMIN — WARFARIN SODIUM 30 MG: 10 TABLET ORAL at 20:11

## 2022-01-30 RX ADMIN — HYDROXYUREA 3000 MG: 500 CAPSULE ORAL at 08:30

## 2022-01-30 RX ADMIN — SODIUM CHLORIDE: 9 INJECTION, SOLUTION INTRAVENOUS at 00:05

## 2022-01-30 RX ADMIN — ENOXAPARIN SODIUM 80 MG: 80 INJECTION SUBCUTANEOUS at 18:14

## 2022-01-30 RX ADMIN — ACETAMINOPHEN 975 MG: 325 TABLET, FILM COATED ORAL at 20:12

## 2022-01-30 RX ADMIN — FLUTICASONE FUROATE AND VILANTEROL TRIFENATATE 1 PUFF: 200; 25 POWDER RESPIRATORY (INHALATION) at 08:22

## 2022-01-30 RX ADMIN — ACETAMINOPHEN 975 MG: 325 TABLET, FILM COATED ORAL at 14:09

## 2022-01-30 RX ADMIN — KETOROLAC TROMETHAMINE 30 MG: 30 INJECTION, SOLUTION INTRAMUSCULAR at 06:39

## 2022-01-30 RX ADMIN — SENNOSIDES AND DOCUSATE SODIUM 2 TABLET: 50; 8.6 TABLET ORAL at 20:11

## 2022-01-30 RX ADMIN — DEFERASIROX 1440 MG: 360 TABLET ORAL at 20:12

## 2022-01-30 ASSESSMENT — ACTIVITIES OF DAILY LIVING (ADL)
ADLS_ACUITY_SCORE: 4

## 2022-01-30 ASSESSMENT — MIFFLIN-ST. JEOR: SCORE: 1532.9

## 2022-01-30 NOTE — PROGRESS NOTES
[facility]    Medicine Progress Note - Hospitalist Service, GOLD TEAM 7    Date of Admission:  1/29/2022    Assessment & Plan        Jennifer Cervantes is a 21 year old female with PMHx of HbSS c/b frequent pain crises, hx of CVA c/b cognitive delay and RUE hemiparesis, hx of acute chest syndrome, iron overload 2/2 chronic transfusion, recurrent and progressive PE initially dz on 2/1/2021 previously on DOAC, but ultimately transitioned to coumadin, anxiety, depression, and Asthma admitted on 1/29/2022 for acute sickle cell pain crisis, with pleuritic chest pain in setting of subtherapeutic INR.     Plan for today:  Decrease IV fluids  Trend Hgb, no transfusion for now (Hgb 6.4)  V/Q scan in the morning, will discuss with Heme her anticoagulation dosing/plan.      # Chest pain - Presenting with pleuritic chest pain for the last week. Without any respiratory symptoms or dyspnea. EKG with sinus tachycardia without any acute ischemic changes. Troponin negative. CXR clear. Mild leukocytosis. Negative COVID 19 PCR. With subtherapeutic INR, will need to rule out another PE with VQ scan. With recent URI symptoms and COVID exposure several weeks ago, possibly pericarditis. CP not reproducible on exam, less likely musculoskeletal or costochondritis.  - Pain management as noted below   - VQ scan, TTE, NT-proBNP, RVP, blood cultures, LFTs and lipase   - Monitor for any hypoxia, with hx of acute chest syndrome   - Telemetry, pulse oximetry      # Hx of PE   # Subtherapeutic INR -  Initially dx with PE on 2/1/2021, and initially treated with DOACs (first on rivaroxaban, then apixaban after patient developed DVT, then rivaroxaban again, and then dabigatran with ASA), but patient kept having recurrent DVT/PE. Patient transitioned to coumadin on 11/2021 after found to have another acute on chronic PE on DOAC + ASA. Patient reports compliance. Presenting with pleuritic CP on admission with  subtherapeutic INR at 1.24.   - Pharmacy consult to dose coumadin  - Lovenox bridge, 1 mg/kg BID until INR therapeutic   - VQ scan as noted above      # HbSS with possibly acute pain crisis   # Hx CVA with RUE hemiparesis from HbSS  Baseline hgb 6.0-7.0s. Currently at baseline. Reticulocyte count up slightly from baseline.    - Pain management per pain plan: Continue PTA MS contin 15 mg BID. Increased PTA oxycodone IR, from 15 mg to 20 mg Q4H PRN. If pain uncontrolled, will add ketamine drip 4 mg /hr   - Add tylenol 975 mg TID, ketolorac 30 mg Q6H   - Hematology consult   - Continue PTA Hydrea 3000 mg daily   - Trend CBC and reticulocyte count   - Bowel regimen   - Benadryl PO PRN For itching   - Zofran PRN   - IS, pulmonary toilet, encourage ambulation   - Infectious work up for trigger with blood cultures, UA, RVP with new leukocytosis      # Hypokalemia - Replete per nursing sliding scale.      # Iron overload - continue PTA jadenu 1400 mg daliy      # Asthma - Continue PTA Symbicort and albuterol      # Depression, anxiety - No longer on medications.         Diet: Regular Diet Adult    DVT Prophylaxis: Enoxaparin (Lovenox) SQ  Lopez Catheter: Not present  Central Lines: PRESENT     Cardiac Monitoring: ACTIVE order. Indication: Chest pain/ ACS rule out (24 hours)  Code Status: Full Code      Disposition Plan   Expected Discharge:    Anticipated discharge location: home with family    Delays:     2-3 days       The patient's care was discussed with the Bedside Nurse, Care Coordinator/ and Patient.    Suraj Aguillon MD  Hospitalist Service, [address]  Securely message with the [employer]oc[employer] Web Console (learn more here)  Text page via [employer] Paging/Directory   Please see signed in provider for up to date coverage information      Clinically Significant Risk Factors Present on Admission             # Overweight: last Body mass index is 29.82  kg/m .      ______________________________________________________________________    Interval History   Patient seen and examined.  No acute overnight events.  Feeling better, still with some back pain, but chest pain has resolved.  Denies N/V, abdominal pain, dizziness, lightheadedness, or other symptoms.    Data reviewed today: I reviewed all medications, new labs and imaging results over the last 24 hours. I personally reviewed no images or EKG's today.    Physical Exam   Vital Signs: Temp: 98.6  F (37  C) Temp src: Oral BP: 120/59 Pulse: 111   Resp: 18 SpO2: 94 % O2 Device: None (Room air)    Weight: 173 lbs 11.2 oz  General Appearance: NAD  Respiratory: CTA b/l  Cardiovascular: tachy, regular S1/S2, no m,r,g  GI: soft, NT, ND, +BS  Skin: no rashes  Other: No edema     Data   Recent Labs   Lab 01/30/22  0648 01/29/22  1001 01/29/22  0514 01/24/22  1056   WBC 11.8* 14.5* 14.6*  --    HGB 6.4* 7.0* 7.2*  --    MCV 84 85 85  --    * 472* 529*  --    INR 1.23*  --  1.24* 1.26*     --  139  --    POTASSIUM 3.8  --  3.2*  --    CHLORIDE 115*  --  112*  --    CO2 21  --  22  --    BUN 9  --  8  --    CR 0.62  --  0.66  --    ANIONGAP 6  --  5  --    MICAH 8.5  --  8.8  --    GLC 96  --  100*  --    ALBUMIN 3.0*  --  3.6  --    PROTTOTAL 6.7*  --  7.6  --    BILITOTAL 1.7*  --  2.1*  --    ALKPHOS 75  --  81  --    ALT 37  --  40  --    AST 35  --  34  --    LIPASE  --   --  218  --      No results found for this or any previous visit (from the past 24 hour(s)).  Medications     ketamine 2 mg/mL ADULT 2 mg/hr (01/30/22 0842)     Warfarin Therapy Reminder         acetaminophen  975 mg Oral TID     deferasirox  1,440 mg Oral QPM     enoxaparin ANTICOAGULANT  1 mg/kg Subcutaneous Q12H     fluticasone-vilanterol  1 puff Inhalation Daily     hydroxyurea  3,000 mg Oral Daily     ketorolac  30 mg Intravenous Q6H     morphine  15 mg Oral BID     polyethylene glycol  17 g Oral Daily     sodium chloride (PF)  3 mL  Intracatheter Q8H     warfarin ANTICOAGULANT  30 mg Oral ONCE at 18:00

## 2022-01-30 NOTE — PROVIDER NOTIFICATION
7a adelaide hong   sats 100 % Room air. tachy tele NSR. oriented x 4. pain well managed per pt. any questions, call   [phone] RN 7a.

## 2022-01-30 NOTE — PLAN OF CARE
"/66 (BP Location: Left arm)   Pulse 108   Temp 98.4  F (36.9  C) (Oral)   Resp 18   Ht 1.626 m (5' 4\")   Wt 78.8 kg (173 lb 11.2 oz)   SpO2 98%   BMI 29.82 kg/m    [id number]  Neuro: Pt. alert & Ox4  Behavior: Pt. calm & cooperative with cares.   Activity: Pt. up ad janett.  Vital: Pt. tachy 108-110, AOVSS on RA. Continuous pulse ox.  LDAs: Chest port with NS at 100cc/hour with Ketamine at 4mg/hour.  Cardiac: Pt. on tele. in sinus tachy.  Respiratory: LS clear  GI/: Pt. voided 600cc. No stools this shift.   Skin: Intact.  Pain/Nausea: Pt's back pain controlled with Ketamine at 4mg/hour & sched. Tordol. Pt. denies nausea.   Diet: Regular  Labs/Imaging:  Morning labs pending.  Plan: Continue to follow POC & notify MD with change in status.      "

## 2022-01-30 NOTE — PROGRESS NOTES
"  Hematology  Daily Progress Note   Date of Service: 01/30/2022    Patient: Himanshu Al  MRN: [id number]  Admission Date: 1/29/2022  Hospital Day # Hospital Day: 2      Initial Reason for Consult: Sickle cell pain crisis      Subjective & Interval History:    No acute events noted overnight. States that her pain is much better. She is expecting to go home tomorrow.    Physical Exam:    /59 (BP Location: Left arm)   Pulse 111   Temp 98.6  F (37  C) (Oral)   Resp 18   Ht 1.626 m (5' 4\")   Wt 78.8 kg (173 lb 11.2 oz)   SpO2 94%   BMI 29.82 kg/m    Gen: Well appearing, in NAD  HEENT: EOMI, pupils equal and round, mmm, oropharynx clear  Pulm: normal work of breathing, no use of accessory muscles  Ext: Warm and well perfused.   Skin: No rash, cyanosis or petechial lesion      Labs & Studies: I personally reviewed the following studies:  Results for HIMANSHU AL (MRN [id number]) as of 1/30/2022 14:35   Ref. Range 1/29/2022 05:14 1/29/2022 10:01 1/30/2022 06:48   WBC Latest Ref Range: 4.0 - 11.0 10e3/uL 14.6 (H) 14.5 (H) 11.8 (H)   Hemoglobin Latest Ref Range: 11.7 - 15.7 g/dL 7.2 (L) 7.0 (L) 6.4 (LL)   Hematocrit Latest Ref Range: 35.0 - 47.0 % 20.8 (L) 20.8 (L) 18.7 (L)   Platelet Count Latest Ref Range: 150 - 450 10e3/uL 529 (H) 472 (H) 528 (H)   RBC Count Latest Ref Range: 3.80 - 5.20 10e6/uL 2.46 (L) 2.44 (L) 2.22 (L)   MCV Latest Ref Range: 78 - 100 fL 85 85 84   MCH Latest Ref Range: 26.5 - 33.0 pg 29.3 28.7 28.8   MCHC Latest Ref Range: 31.5 - 36.5 g/dL 34.6 33.7 34.2   RDW Latest Ref Range: 10.0 - 15.0 % 23.8 (H) 23.3 (H) 24.1 (H)   % Neutrophils Latest Units: % 72 68    % Lymphocytes Latest Units: % 14 20    % Monocytes Latest Units: % 5 7    % Eosinophils Latest Units: % 7 5    % Basophils Latest Units: % 2 0    Absolute Basophils Latest Ref Range: 0.0 - 0.2 10e3/uL 0.3 (H) 0.0    NRBC/W Latest Ref Range: <=0 % 13 (H) 17 (H)    Absolute Neutrophil Latest Ref Range: 1.6 - 8.3 10e3/uL 10.5 (H) 9.9 " (H)    Absolute Lymphocytes Latest Ref Range: 0.8 - 5.3 10e3/uL 2.0 2.9    Absolute Monocytes Latest Ref Range: 0.0 - 1.3 10e3/uL 0.7 1.0    Absolute Eosinophils Latest Ref Range: 0.0 - 0.7 10e3/uL 1.0 (H) 0.7    Absolute NRBCs Latest Ref Range: <=0.0 10e3/uL 1.9 (H) 2.5 (H)    RBC Morphology Unknown Confirmed RBC Indices Confirmed RBC Indices Confirmed RBC Indices   Platelet Morphology Latest Ref Range: Automated Count Confirmed. Platelet morphology is normal.  Automated Count Confirmed. Platelet morphology is normal. Automated Count Confirmed. Platelet morphology is normal. Automated Count Confirmed. Platelet morphology is normal.   Polychromasia Latest Ref Range: None Seen  Moderate (A)     Sickle Cells Latest Ref Range: None Seen  Marked (A) Marked (A) Moderate (A)   Target Cells Latest Ref Range: None Seen  Slight (A) Slight (A) Slight (A)   % Retic Latest Ref Range: 0.5 - 2.0 % 14.3 (H)  13.8 (H)   Absolute Retic Latest Ref Range: 0.025 - 0.095 10e6/uL 0.351 (H)  0.296 (H)       Medication list reviewed.    Assessment & Plan:   Jennifer Cervantes is a 21 year old female with history of hemoglobin SS and frequent pain crises, CVA with right upper extremity hemiparesis who presented with sickle cell pain that is currently controlled with current pain regimen.    #Acute sickle cell crisis  Pain is currently controlled on current regimen. Hb 6.4 today but likely dilutional in the setting of IVF. Would recommend decreasing IVF if the patient has adequate PO intake. No need for transfusion at this time especially with her history of iron overload in the setting of transfusions as evident on MRI abdomen 1/25/2022. Workup thus far has been negative for any infectious source including blood cx, ua, cxr.     #History of DVT, PE, CVA  INR continues to be subtherapeutic. Currently on enoxaparin for bridging    Recommendations:   - Discontinue IVF if adequate PO intake  - No pRBC transfusions at this time. Please call  hematology if concerns for transfusions.  - Cont lovenox 1 mg/kg BID, bridge to coumadin  - Monitor INR, pharmacy to dose warfarin  - Pain management per primary team    Patient was seen and plan of care was discussed with attending physician Dr. Nighat Thornton.    We will continue to follow this patient. Please don't hesitate to contact the Fellow On-Call with questions.    Steve Weaver MD   Heme/Onc/Transplant Fellow  Pgr #[phone]     Attending Note:  I have reviewed the patient chart, and interviewed and examined the patient.  I agree with the assessment and plan.  Nighat Thornton MD  Hematology

## 2022-01-30 NOTE — PLAN OF CARE
Follow up on viral URI, continue supportive care From ED with sickle cell pain crisis.     Vitals: stable room air, on continuous pulsac. Tachy has a tele on.   Neuro : a x o x 4.   Cardiac: tele strip sinus tachy. 100 HR.   Blood glucose: NA  Pain/nausea: chest pain improved but has back pain. On IV ketamine continuous with no bolus @ 4 mg/hr (2 mg/ml). Pain well managed. She also gets scheduled morphine, tylenol and toradol.   Diet: regular, good appetite  Lines: Port a cath infusing NS-100 with ketamine.   : voiding well. UA sent (normal).   GI: no BM this shift but last BM yesterday.   Drains: NA  Skin: 7a skin check done. Skin WDL. No issues found.   Mobility: up ad janett. TIFFANY deformed from stroke, has a bedside commode.

## 2022-01-30 NOTE — PLAN OF CARE
"/62 (BP Location: Left arm)   Pulse 105   Temp 98.5  F (36.9  C) (Oral)   Resp 18   Ht 1.626 m (5' 4\")   Wt 78.8 kg (173 lb 11.2 oz)   SpO2 95%   BMI 29.82 kg/m      Neuro: A&Ox4. RUE hemiparesis. H/O Cerebral infarction   Cardiac: Tele monitoring --110's, OVSS.   Respiratory: On RA sating 95%. On continuous pulse oximetry  GI/: Voiding in bedside commode with good uop. No BM   Diet/appetite: Regular diet. Appetite fair  Activity: Up with SBA   Pain: Back pain managed with ketamine. Rate decreased to 2mg/hr today   Skin: No new deficits noted.  LDA's: Port a cath infusing NS - decreased to 75ml/hr today with ketamine y site     Plan: Continue with POC. Notify primary team with changes.    "

## 2022-01-31 ENCOUNTER — APPOINTMENT (OUTPATIENT)
Dept: NUCLEAR MEDICINE | Facility: CLINIC | Age: 22
DRG: 812 | End: 2022-01-31
Attending: PHYSICIAN ASSISTANT
Payer: COMMERCIAL

## 2022-01-31 VITALS
OXYGEN SATURATION: 93 % | RESPIRATION RATE: 16 BRPM | WEIGHT: 169.6 LBS | DIASTOLIC BLOOD PRESSURE: 95 MMHG | HEART RATE: 104 BPM | TEMPERATURE: 98.3 F | HEIGHT: 64 IN | SYSTOLIC BLOOD PRESSURE: 146 MMHG | BODY MASS INDEX: 28.95 KG/M2

## 2022-01-31 LAB
ALBUMIN SERPL-MCNC: 3.2 G/DL (ref 3.4–5)
ALP SERPL-CCNC: 72 U/L (ref 40–150)
ALT SERPL W P-5'-P-CCNC: 39 U/L (ref 0–50)
ANION GAP SERPL CALCULATED.3IONS-SCNC: 4 MMOL/L (ref 3–14)
AST SERPL W P-5'-P-CCNC: 40 U/L (ref 0–45)
BILIRUB SERPL-MCNC: 1.6 MG/DL (ref 0.2–1.3)
BUN SERPL-MCNC: 8 MG/DL (ref 7–30)
CALCIUM SERPL-MCNC: 8.7 MG/DL (ref 8.5–10.1)
CHLORIDE BLD-SCNC: 114 MMOL/L (ref 94–109)
CO2 SERPL-SCNC: 24 MMOL/L (ref 20–32)
CREAT SERPL-MCNC: 0.49 MG/DL (ref 0.52–1.04)
ERYTHROCYTE [DISTWIDTH] IN BLOOD BY AUTOMATED COUNT: 24.4 % (ref 10–15)
GFR SERPL CREATININE-BSD FRML MDRD: >90 ML/MIN/1.73M2
GLUCOSE BLD-MCNC: 88 MG/DL (ref 70–99)
HCT VFR BLD AUTO: 19.5 % (ref 35–47)
HGB BLD-MCNC: 6.6 G/DL (ref 11.7–15.7)
INR PPP: 1.21 (ref 0.85–1.15)
LMWH PPP CHRO-ACNC: 0.7 IU/ML
MCH RBC QN AUTO: 28.9 PG (ref 26.5–33)
MCHC RBC AUTO-ENTMCNC: 33.8 G/DL (ref 31.5–36.5)
MCV RBC AUTO: 86 FL (ref 78–100)
PLATELET # BLD AUTO: 435 10E3/UL (ref 150–450)
POTASSIUM BLD-SCNC: 3.8 MMOL/L (ref 3.4–5.3)
PROT SERPL-MCNC: 6.9 G/DL (ref 6.8–8.8)
RBC # BLD AUTO: 2.28 10E6/UL (ref 3.8–5.2)
SODIUM SERPL-SCNC: 142 MMOL/L (ref 133–144)
WBC # BLD AUTO: 9.5 10E3/UL (ref 4–11)

## 2022-01-31 PROCEDURE — 250N000009 HC RX 250: Performed by: INTERNAL MEDICINE

## 2022-01-31 PROCEDURE — 250N000011 HC RX IP 250 OP 636: Performed by: PHYSICIAN ASSISTANT

## 2022-01-31 PROCEDURE — 85520 HEPARIN ASSAY: CPT | Performed by: INTERNAL MEDICINE

## 2022-01-31 PROCEDURE — 272N000035 NM LUNG SCAN VENTILATION AND PERFUSION

## 2022-01-31 PROCEDURE — 250N000013 HC RX MED GY IP 250 OP 250 PS 637: Performed by: INTERNAL MEDICINE

## 2022-01-31 PROCEDURE — A9540 TC99M MAA: HCPCS | Performed by: INTERNAL MEDICINE

## 2022-01-31 PROCEDURE — A9567 TECHNETIUM TC-99M AEROSOL: HCPCS | Performed by: INTERNAL MEDICINE

## 2022-01-31 PROCEDURE — 82040 ASSAY OF SERUM ALBUMIN: CPT | Performed by: INTERNAL MEDICINE

## 2022-01-31 PROCEDURE — 250N000013 HC RX MED GY IP 250 OP 250 PS 637: Performed by: PHYSICIAN ASSISTANT

## 2022-01-31 PROCEDURE — 85027 COMPLETE CBC AUTOMATED: CPT | Performed by: INTERNAL MEDICINE

## 2022-01-31 PROCEDURE — 85610 PROTHROMBIN TIME: CPT | Performed by: PHYSICIAN ASSISTANT

## 2022-01-31 PROCEDURE — 258N000003 HC RX IP 258 OP 636: Performed by: INTERNAL MEDICINE

## 2022-01-31 PROCEDURE — 343N000001 HC RX 343: Performed by: INTERNAL MEDICINE

## 2022-01-31 PROCEDURE — 78582 LUNG VENTILAT&PERFUS IMAGING: CPT | Mod: 26 | Performed by: RADIOLOGY

## 2022-01-31 PROCEDURE — 99233 SBSQ HOSP IP/OBS HIGH 50: CPT | Performed by: PHYSICIAN ASSISTANT

## 2022-01-31 PROCEDURE — 258N000003 HC RX IP 258 OP 636: Performed by: PHYSICIAN ASSISTANT

## 2022-01-31 PROCEDURE — 36591 DRAW BLOOD OFF VENOUS DEVICE: CPT | Performed by: INTERNAL MEDICINE

## 2022-01-31 PROCEDURE — 80053 COMPREHEN METABOLIC PANEL: CPT | Performed by: INTERNAL MEDICINE

## 2022-01-31 PROCEDURE — 120N000011 HC R&B TRANSPLANT UMMC

## 2022-01-31 RX ORDER — OXYCODONE HYDROCHLORIDE 5 MG/1
15-20 TABLET ORAL EVERY 4 HOURS PRN
Status: DISCONTINUED | OUTPATIENT
Start: 2022-01-31 | End: 2022-02-01 | Stop reason: HOSPADM

## 2022-01-31 RX ORDER — HEPARIN SODIUM,PORCINE 10 UNIT/ML
5-10 VIAL (ML) INTRAVENOUS EVERY 24 HOURS
Status: DISCONTINUED | OUTPATIENT
Start: 2022-01-31 | End: 2022-02-01 | Stop reason: HOSPADM

## 2022-01-31 RX ORDER — WARFARIN SODIUM 10 MG/1
30 TABLET ORAL
Status: COMPLETED | OUTPATIENT
Start: 2022-01-31 | End: 2022-01-31

## 2022-01-31 RX ORDER — HEPARIN SODIUM,PORCINE 10 UNIT/ML
5-10 VIAL (ML) INTRAVENOUS
Status: DISCONTINUED | OUTPATIENT
Start: 2022-01-31 | End: 2022-02-01 | Stop reason: HOSPADM

## 2022-01-31 RX ORDER — HEPARIN SODIUM (PORCINE) LOCK FLUSH IV SOLN 100 UNIT/ML 100 UNIT/ML
5-10 SOLUTION INTRAVENOUS
Status: DISCONTINUED | OUTPATIENT
Start: 2022-01-31 | End: 2022-02-01 | Stop reason: HOSPADM

## 2022-01-31 RX ORDER — WARFARIN SODIUM 5 MG/1
TABLET ORAL
Qty: 190 TABLET | Refills: 3 | Status: SHIPPED | OUTPATIENT
Start: 2022-01-31 | End: 2022-02-24

## 2022-01-31 RX ADMIN — ENOXAPARIN SODIUM 80 MG: 80 INJECTION SUBCUTANEOUS at 09:09

## 2022-01-31 RX ADMIN — ACETAMINOPHEN 975 MG: 325 TABLET, FILM COATED ORAL at 09:09

## 2022-01-31 RX ADMIN — DEFERASIROX 1440 MG: 360 TABLET ORAL at 19:32

## 2022-01-31 RX ADMIN — KETAMINE HYDROCHLORIDE 2 MG/HR: 100 INJECTION, SOLUTION, CONCENTRATE INTRAMUSCULAR; INTRAVENOUS at 06:51

## 2022-01-31 RX ADMIN — OXYCODONE HYDROCHLORIDE 15 MG: 5 TABLET ORAL at 14:43

## 2022-01-31 RX ADMIN — MORPHINE SULFATE 15 MG: 15 TABLET, EXTENDED RELEASE ORAL at 19:33

## 2022-01-31 RX ADMIN — HYDROXYUREA 3000 MG: 500 CAPSULE ORAL at 09:09

## 2022-01-31 RX ADMIN — DEFEROXAMINE MESYLATE 4000 MG: 2 INJECTION, POWDER, LYOPHILIZED, FOR SOLUTION INTRAMUSCULAR; INTRAVENOUS; SUBCUTANEOUS at 15:57

## 2022-01-31 RX ADMIN — WARFARIN SODIUM 30 MG: 10 TABLET ORAL at 18:23

## 2022-01-31 RX ADMIN — ENOXAPARIN SODIUM 80 MG: 80 INJECTION SUBCUTANEOUS at 19:32

## 2022-01-31 RX ADMIN — ACETAMINOPHEN 975 MG: 325 TABLET, FILM COATED ORAL at 19:32

## 2022-01-31 RX ADMIN — ACETAMINOPHEN 975 MG: 325 TABLET, FILM COATED ORAL at 14:39

## 2022-01-31 RX ADMIN — KETOROLAC TROMETHAMINE 30 MG: 30 INJECTION, SOLUTION INTRAMUSCULAR at 00:07

## 2022-01-31 RX ADMIN — KETOROLAC TROMETHAMINE 30 MG: 30 INJECTION, SOLUTION INTRAMUSCULAR at 06:03

## 2022-01-31 RX ADMIN — KIT FOR THE PREPARATION OF TECHNETIUM TC 99M PENTETATE 2 MILLICURIE: 20 INJECTION, POWDER, LYOPHILIZED, FOR SOLUTION INTRAVENOUS; RESPIRATORY (INHALATION) at 13:30

## 2022-01-31 RX ADMIN — KIT FOR THE PREPARATION OF TECHNETIUM TC 99M ALBUMIN AGGREGATED 6.3 MILLICURIE: 2.5 INJECTION, POWDER, FOR SOLUTION INTRAVENOUS at 14:02

## 2022-01-31 RX ADMIN — KETOROLAC TROMETHAMINE 30 MG: 30 INJECTION, SOLUTION INTRAMUSCULAR at 12:54

## 2022-01-31 RX ADMIN — MORPHINE SULFATE 15 MG: 15 TABLET, EXTENDED RELEASE ORAL at 09:09

## 2022-01-31 ASSESSMENT — ACTIVITIES OF DAILY LIVING (ADL)
ADLS_ACUITY_SCORE: 4
ADLS_ACUITY_SCORE: 8
ADLS_ACUITY_SCORE: 4
ADLS_ACUITY_SCORE: 8
ADLS_ACUITY_SCORE: 4
ADLS_ACUITY_SCORE: 4
ADLS_ACUITY_SCORE: 8
ADLS_ACUITY_SCORE: 4
DEPENDENT_IADLS:: CLEANING;COOKING;SHOPPING;TRANSPORTATION
ADLS_ACUITY_SCORE: 4

## 2022-01-31 ASSESSMENT — MIFFLIN-ST. JEOR: SCORE: 1514.3

## 2022-01-31 NOTE — PROGRESS NOTES
Hematology  Daily Progress Note   Date of Service: 01/31/2022    Patient: Jennifer Cervantes  MRN: [id number]  Admission Date: 1/29/2022  Hospital Day # Hospital Day: 3      Initial Reason for Consult: Sickle cell pain crisis     Assessment & Plan:   Jennifer Cervantes is a 21 year old female with history of hemoglobin SS and frequent pain crises, CVA with right upper extremity hemiparesis, PE complicated by subtherapeutic INR on warfarin despite compliance who presented with sickle cell pain on 1/29 that is currently controlled with current pain regimen and she is feeling well enough for discharge      #Acute sickle cell crisis  Pain is currently controlled on current regimen. Hb 6.6 today from 6.4 yesterday. No need for transfusion at this time especially with her history of iron overload in the setting of transfusions as evident on MRI abdomen 1/25/2022. Workup thus far has been negative for any infectious source including blood cx, ua, cxr.      #History of DVT, PE, CVA  INR continues to be subtherapeutic. Currently on enoxaparin for bridging.    Scheduled for VQ scan today   -Will monitor INR closely in anticoagulation clinic on discharge      #Iron overload  -continue jadenu  -Given desferal 50 mg/kg IV over 8 hours today then can discharge      Recommendations:   - No pRBC transfusions at this time. Please call hematology if concerns for transfusions.  - Cont lovenox 1 mg/kg BID, bridge to coumadin- nurse to do lovenox injection teaching,   - Monitor INR, pharmacy to dose warfarin-please discuss any other reasons (medication interaction,etc)  - Pain management per primary team    Patient was seen and plan of care was discussed with attending physician Dr. Thornton      Please don't hesitate to contact the Fellow On-Call with questions.    I spent 35 minutes face-to-face or coordinating care of Jennifer Cervantes.  Over 50% of our time on the unit was spent counseling the patient and/or coordinating care regarding  "subtherapeutic INR, sickle cell pain crisis     Cherelle Brock PA-C  Benign Hematology  [phone]    Attending Note:  Jennifer Cervantes was seen and evaluated today as part of a shared APRN/PA visit. I reviewed extensive notes, laboratory results and imaging reports.  She was admitted for sickle cell crisis, which has resolved.  She is feeling well today.  My key exam findings include 21-year-old woman in no acute distress, right upper extremity paresis.   My assessment is uncomplicated sickle cell crisis, resolved.  She does have significant iron overload, and her primary outpatient physician would like her to get a dose of deferoxamine.  She also has had difficult to manage pulmonary embolism.  Apparently has had recurrence in spite of other agents, so is now on warfarin.  However requiring large doses of warfarin.  She will need to go home on enoxaparin while the warfarin is being adjusted.  Key management decisions made by me and carried out under my direction include give deferoxamine over 8 hours.  Continue enoxaparin as an outpatient every 12 hours until INR is 2.  She will need teaching on how to give herself the enoxaparin doses.  She will need daily INR to make sure that she does not go too high with the large dose of warfarin.  I spent 15 minutes face-to-face and/or coordinating care. Over 50% of our time on the unit was spent counseling the patient and/or coordinating care regarding sickle cell disease, iron overload, anticoagulation.    Nighat Thornton MD  Hematology  ___________________________________________________________________  Subjective & Interval History:    No acute events noted overnight.   INR 1.21 despite warfarin 30 mg given last night   Will have VQ scan today   Would like to discharge       Physical Exam:    /71 (BP Location: Left arm)   Pulse 107   Temp 98.3  F (36.8  C) (Oral)   Resp 18   Ht 1.626 m (5' 4\")   Wt 76.9 kg (169 lb 9.6 oz)   SpO2 99%   BMI 29.11 kg/m    Gen: Well " appearing, in NAD  Pulm: normal work of breathing on RA   Ext: Warm and well perfused. No lower extremity edema  Skin: No rash, cyanosis or petechial lesion  Neuro: Alert and answering questions appropriately.     Labs & Studies: I personally reviewed the following studies:  ROUTINE LABS (Last four results):  CMPRecent Labs   Lab 01/31/22  0650 01/30/22  0648 01/29/22  0514    142 139   POTASSIUM 3.8 3.8 3.2*   CHLORIDE 114* 115* 112*   CO2 24 21 22   ANIONGAP 4 6 5   GLC 88 96 100*   BUN 8 9 8   CR 0.49* 0.62 0.66   GFRESTIMATED >90 >90 >90   MICAH 8.7 8.5 8.8   PROTTOTAL 6.9 6.7* 7.6   ALBUMIN 3.2* 3.0* 3.6   BILITOTAL 1.6* 1.7* 2.1*   ALKPHOS 72 75 81   AST 40 35 34   ALT 39 37 40     CBC  Recent Labs   Lab 01/31/22  0650 01/30/22  0648 01/29/22  1001 01/29/22  0514   WBC 9.5 11.8* 14.5* 14.6*   RBC 2.28* 2.22* 2.44* 2.46*   HGB 6.6* 6.4* 7.0* 7.2*   HCT 19.5* 18.7* 20.8* 20.8*   MCV 86 84 85 85   MCH 28.9 28.8 28.7 29.3   MCHC 33.8 34.2 33.7 34.6   RDW 24.4* 24.1* 23.3* 23.8*    528* 472* 529*     INR  Recent Labs   Lab 01/31/22  0650 01/30/22  0648 01/29/22  0514   INR 1.21* 1.23* 1.24*       Medications list for reference:  Current Facility-Administered Medications   Medication     acetaminophen (TYLENOL) tablet 975 mg     albuterol (PROVENTIL HFA/VENTOLIN HFA) inhaler     albuterol (PROVENTIL) neb solution 2.5 mg     alum & mag hydroxide-simethicone (MAALOX) suspension 30 mL     deferasirox (JADENU) tablet 1,440 mg     deferoxamine (DESFERAL) 3,847.6154 mg in sodium chloride 0.9 % 500 mL intermittent infusion     diphenhydrAMINE (BENADRYL) capsule 25-50 mg     enoxaparin ANTICOAGULANT (LOVENOX) injection 80 mg     fluticasone-vilanterol (BREO ELLIPTA) 200-25 MCG/INH inhaler 1 puff     hydroxyurea (HYDREA) capsule 3,000 mg     ketorolac (TORADOL) injection 30 mg     lidocaine (LMX4) cream     lidocaine 1 % 1 mL     magnesium hydroxide (MILK OF MAGNESIA) suspension 30 mL     melatonin tablet 6 mg      morphine (MS CONTIN) 12 hr tablet 15 mg     naloxone (NARCAN) injection 0.2 mg    Or     naloxone (NARCAN) injection 0.4 mg    Or     naloxone (NARCAN) injection 0.2 mg    Or     naloxone (NARCAN) injection 0.4 mg     ondansetron (ZOFRAN-ODT) ODT tab 4 mg    Or     ondansetron (ZOFRAN) injection 4 mg     oxyCODONE (ROXICODONE) tablet 15-20 mg     polyethylene glycol (MIRALAX) Packet 17 g     senna-docusate (SENOKOT-S/PERICOLACE) 8.6-50 MG per tablet 1 tablet    Or     senna-docusate (SENOKOT-S/PERICOLACE) 8.6-50 MG per tablet 2 tablet     sodium chloride (PF) 0.9% PF flush 3 mL     sodium chloride (PF) 0.9% PF flush 3 mL     Warfarin Therapy Reminder (Check START DATE - warfarin may be starting in the FUTURE)

## 2022-01-31 NOTE — PLAN OF CARE
Vitals: stable room air, on continuous pulsac. Tachy has a tele on.   Neuro : a x o x 4.   Cardiac: tele strip sinus tachy. 111 HR.   Blood glucose: NA  Pain/nausea: On IV ketamine continuous (weaned from 4 to 2mg/hr).    Pain well managed. She also gets scheduled morphine, tylenol and toradol.   Diet: regular, good appetite  Lines: Port a cath infusing TKO NS with ketamine (IVF discontinued).   : voiding well. Uses commode.   GI: No BM since 1/29. Senna 2 Tab given.   Drains: NA  Skin: WDL.   Mobility: up SBA x walker commode.   Labs : hemoglobin 6.4 (team aware), no transfusions as of now (read team notes).   Plan- V/Q scan tomorrow. Potential discharge to home.

## 2022-01-31 NOTE — PHARMACY-ANTICOAGULATION SERVICE
Jennifer Cervantes is a 21 year old female on  Enoxaparin 80 mg SubQ q12 hr indicated for history of DVT, PE, CVA  Actual Body Weight: 76.9 kg.    Renal function is stable. CRCL = 180 ml/min.  The trend in platelet count is stable..    Treatment began 1/29 and pt is at steady state.  After reaching steady state, the 4 hour post-dose GOAL Heparin 10A is 0.6-1 IU/mL for typical 1 mg/kg BID dosing or 1-2 units/mL for 1.5 mg/kg once daily dosing.         Heparin 10A level (IU/mL)     0.7 @1418      Last dose given        0909       The level was drawn 6 hours after the dose. Current level is therapeutic. Recommend no change.     The pharmacist will continue to monitor and follow [unfilled] daily.  Please contact pharmacy if enoxaparin needs to be held for a procedure or if goal levels change.

## 2022-01-31 NOTE — PROVIDER NOTIFICATION
"Gold cross-cover paged: \"SAIMA Cervantes on 7A, Gold 7  Pt's Hgb 6.6 this morning. Previous Hgb 6.4 yesterday.  Vitals stable & pt. Asymptomatic.\"  Thank you, Sudheer [phone]  "

## 2022-01-31 NOTE — PLAN OF CARE
"/81 (BP Location: Left arm)   Pulse 87   Temp 98.6  F (37  C) (Axillary)   Resp 18   Ht 1.626 m (5' 4\")   Wt 76.9 kg (169 lb 9.6 oz)   SpO2 96%   BMI 29.11 kg/m    [id number]  Neuro: Pt. alert & Ox4  Behavior: Pt. calm & cooperative with cares.   Activity: Pt. up SBA with walker to commode.  Vital: Pt. intermittent tachy , AOVSS on RA. Continuous pulse ox.  LDAs: Chest port with NS at TKO with Ketamine gtt at 2mg/hour.  Cardiac: Pt. on tele. in sinus tachy.  Respiratory: LS clear  GI/: Pt. voiding adequate amounts. No stools this shift. Last BM 1/28.  Skin: Intact.  Pain/Nausea: Pt's abdominal controlled with Ketamine at 2mg/hour & sched. Tordol. Pt. denies nausea. Pt. slept well overnight.  Diet: Regular  Labs/Imaging: Hgb 6.6 this morning & Gold cross-cover notified along with on-coming, RNSudheer.   Plan: V/Q scan today. Potential discharge home today. Continue to follow POC & notify MD with change in status.       "

## 2022-01-31 NOTE — CONSULTS
Care Management Initial Consult    General Information  Assessment completed with: Patient,VM-chart review, Type of CM/SW Visit: Initial Assessment    Primary Care Provider verified and updated as needed: Yes   Readmission within the last 30 days: previous discharge plan unsuccessful   Return Category: Exacerbation of disease    Reason for Consult: utilization management concerns,transportation  Advance Care Planning: Legal NOK would be her mom     Communication Assessment  Patient's communication style: spoken language (English or Bilingual)    Hearing Difficulty or Deaf: no   Wear Glasses or Blind: yes    Cognitive  Cognitive/Neuro/Behavioral: WDL                      Living Environment:   People in home: parent(s),sibling(s)     Current living Arrangements: house      Able to return to prior arrangements: yes    Family/Social Support:  Care provided by: self,parent(s)  Provides care for: no one, unable/limited ability to care for self  Marital Status: Single  Support: Sibling(s), Mom who is PCA          Description of Support System: Supportive    Support Assessment: Adequate family and caregiver support    Current Resources:   Patient receiving home care services: No  Community Resources: PCA  Equipment currently used at home: none  Supplies currently used at home: None    Employment/Financial:  Employment Status: disabled     Financial Concerns: No concerns identified   Referral to Financial Counselor: No     Lifestyle & Psychosocial Needs:  Social Determinants of Health     Tobacco Use: Low Risk      Smoking Tobacco Use: Never Smoker     Smokeless Tobacco Use: Never Used   Alcohol Use: Not on file   Financial Resource Strain: Not on file   Food Insecurity: Not on file   Transportation Needs: Not on file   Physical Activity: Not on file   Stress: Not on file   Social Connections: Not on file   Intimate Partner Violence: Not At Risk     Fear of Current or Ex-Partner: No     Emotionally Abused: No     Physically  Abused: No     Sexually Abused: No   Depression: Not at risk     PHQ-2 Score: 0   Housing Stability: Not on file     Functional Status:  Prior to admission patient needed assistance:   Dependent ADLs: Bathing,Dressing,Grooming  Dependent IADLs: Cleaning,Cooking,Shopping,Transportation  Assesssment of Functional Status: At functional baseline    Mental Health Status:  Mental Health Status: No Current Concerns       Chemical Dependency Status:  Chemical Dependency Status: No Current Concerns         Values/Beliefs:  Spiritual, Cultural Beliefs, Adventism Practices, Values that affect care: no             Additional Information:  Patient is a 21 year old female with PMHx of HbSS c/b frequent pain crises, hx of CVA c/b cognitive delay and RUE hemiparesis, hx of acute chest syndrome, iron overload 2/2 chronic transfusion, recurrent and progressive PE initially dz on 2/1/2021 previously on DOAC, but ultimately transitioned to coumadin, anxiety, depression, and Asthma admitted on 1/29/2022 for acute sickle cell pain crisis, with pleuritic chest pain in setting of subtherapeutic INR.     SW met with patient at bedside to complete assessment. Everything has been unchanged for patient since her last assessment completed by Farhana Lockhart RNCC. Patient still has PCA services for her mom. Patient requested a ride to get home. UDAY set up a will call ride with [employer] ([phone]). UDAY relayed to bedside RN as patient was not in her room.     CARLOS Vazquez, NATALIA  7A/5C Medicine   Ph: [phone]  Pager: [phone]

## 2022-01-31 NOTE — PROGRESS NOTES
[facility]    Medicine Progress Note - Hospitalist Service, GOLD TEAM 7    Date of Admission:  1/29/2022    Assessment & Plan        Jennifer Cervantes is a 21 year old female with PMHx of HbSS c/b frequent pain crises, hx of CVA c/b cognitive delay and RUE hemiparesis, hx of acute chest syndrome, iron overload 2/2 chronic transfusion, recurrent and progressive PE initially dz on 2/1/2021 previously on DOAC, but ultimately transitioned to coumadin, anxiety, depression, and Asthma admitted on 1/29/2022 for acute sickle cell pain crisis, with pleuritic chest pain in setting of subtherapeutic INR.     Plan for today:  Can discharge today if: (1) She can get her lovenox scripts (2) she feels comfortable giving the shots (3) the deferoxamine is complete and (4) her pain is controlled.  Discharge order is placed, but if there are questions/uncertainties, would prefer to keep until tomorrow morning.      # Chest pain - Presenting with pleuritic chest pain for the last week. Without any respiratory symptoms or dyspnea. EKG with sinus tachycardia without any acute ischemic changes. Troponin negative. CXR clear. Mild leukocytosis. Negative COVID 19 PCR. With subtherapeutic INR, will need to rule out another PE with VQ scan. With recent URI symptoms and COVID exposure several weeks ago, possibly pericarditis. CP not reproducible on exam, less likely musculoskeletal or costochondritis.  - Pain management as noted below   - VQ scan, TTE, NT-proBNP, RVP, blood cultures, LFTs and lipase   - Monitor for any hypoxia, with hx of acute chest syndrome   - Telemetry, pulse oximetry      # Hx of PE   # Subtherapeutic INR -  Initially dx with PE on 2/1/2021, and initially treated with DOACs (first on rivaroxaban, then apixaban after patient developed DVT, then rivaroxaban again, and then dabigatran with ASA), but patient kept having recurrent DVT/PE. Patient transitioned to coumadin on 11/2021  after found to have another acute on chronic PE on DOAC + ASA. Patient reports compliance. Presenting with pleuritic CP on admission with subtherapeutic INR at 1.24.   - Pharmacy consult to dose coumadin  - Lovenox bridge, 1 mg/kg BID until INR therapeutic   - VQ scan as noted above      # HbSS with possibly acute pain crisis   # Hx CVA with RUE hemiparesis from HbSS  Baseline hgb 6.0-7.0s. Currently at baseline. Reticulocyte count up slightly from baseline.    - Pain management per pain plan: Continue PTA MS contin 15 mg BID. Increased PTA oxycodone IR, from 15 mg to 20 mg Q4H PRN. If pain uncontrolled, will add ketamine drip 4 mg /hr   - Add tylenol 975 mg TID, ketolorac 30 mg Q6H   - Hematology consult   - Continue PTA Hydrea 3000 mg daily   - Trend CBC and reticulocyte count   - Bowel regimen   - Benadryl PO PRN For itching   - Zofran PRN   - IS, pulmonary toilet, encourage ambulation   - Infectious work up for trigger with blood cultures, UA, RVP with new leukocytosis      # Hypokalemia - Replete per nursing sliding scale.      # Iron overload - continue PTA jadenu 1400 mg daliy      # Asthma - Continue PTA Symbicort and albuterol      # Depression, anxiety - No longer on medications.           Diet: Regular Diet Adult  Diet    DVT Prophylaxis: Enoxaparin (Lovenox) SQ  Lopez Catheter: Not present  Central Lines: PRESENT     Cardiac Monitoring: None  Code Status: Full Code      Disposition Plan   Expected Discharge: 01/31/2022     Anticipated discharge location: home with family    Delays:    Pending AC plan       The patient's care was discussed with the Bedside Nurse, Care Coordinator/, Patient and Heme Consultant.    Suraj Aguillon MD  Hospitalist Service, GOLD TEAM [address] [place] [address]  Securely message with the Vocera Web Console (learn more here)  Text page via [employer] Paging/Directory   Please see signed in provider for up to date coverage  information      Clinically Significant Risk Factors Present on Admission             # Overweight: last Body mass index is 29.11 kg/m .      ______________________________________________________________________    Interval History   Patient seen and examined.  No acute overnight events.  Pain is controlled on her regular regimen.  Patient denies dyspnea, palpitations, N/V, abdominal pain or other symptoms currently.     Data reviewed today: I reviewed all medications, new labs and imaging results over the last 24 hours. I personally reviewed no images or EKG's today.    Physical Exam   Vital Signs: Temp: 98.3  F (36.8  C) Temp src: Oral BP: 130/72 Pulse: 103   Resp: 18 SpO2: 100 % O2 Device: None (Room air)    Weight: 169 lbs 9.6 oz  General Appearance: NAD  Respiratory: CTA b/l  Cardiovascular: tachy, regular, S1/S2, no m,r,g  GI: soft, NT, ND, +BS  Skin: no rashes  Other: No edema     Data   Recent Labs   Lab 01/31/22  0650 01/30/22  0648 01/29/22  1001 01/29/22  0514   WBC 9.5 11.8* 14.5* 14.6*   HGB 6.6* 6.4* 7.0* 7.2*   MCV 86 84 85 85    528* 472* 529*   INR 1.21* 1.23*  --  1.24*    142  --  139   POTASSIUM 3.8 3.8  --  3.2*   CHLORIDE 114* 115*  --  112*   CO2 24 21  --  22   BUN 8 9  --  8   CR 0.49* 0.62  --  0.66   ANIONGAP 4 6  --  5   MICAH 8.7 8.5  --  8.8   GLC 88 96  --  100*   ALBUMIN 3.2* 3.0*  --  3.6   PROTTOTAL 6.9 6.7*  --  7.6   BILITOTAL 1.6* 1.7*  --  2.1*   ALKPHOS 72 75  --  81   ALT 39 37  --  40   AST 40 35  --  34   LIPASE  --   --   --  218     No results found for this or any previous visit (from the past 24 hour(s)).  Medications     Warfarin Therapy Reminder         acetaminophen  975 mg Oral TID     deferasirox  1,440 mg Oral QPM     deferoxamine (DESFERAL) intermittent infusion ADULT  4,000 mg Intravenous Daily     enoxaparin ANTICOAGULANT  1 mg/kg Subcutaneous Q12H     fluticasone-vilanterol  1 puff Inhalation Daily     hydroxyurea  3,000 mg Oral Daily     ketorolac   30 mg Intravenous Q6H     morphine  15 mg Oral BID     polyethylene glycol  17 g Oral Daily     sodium chloride (PF)  3 mL Intracatheter Q8H     warfarin ANTICOAGULANT  30 mg Oral ONCE at 18:00

## 2022-02-01 ENCOUNTER — PATIENT OUTREACH (OUTPATIENT)
Dept: ONCOLOGY | Facility: CLINIC | Age: 22
End: 2022-02-01
Payer: COMMERCIAL

## 2022-02-01 ENCOUNTER — HOME INFUSION (PRE-WILLOW HOME INFUSION) (OUTPATIENT)
Dept: PHARMACY | Facility: CLINIC | Age: 22
End: 2022-02-01

## 2022-02-01 ENCOUNTER — TELEPHONE (OUTPATIENT)
Dept: ANTICOAGULATION | Facility: CLINIC | Age: 22
End: 2022-02-01
Payer: COMMERCIAL

## 2022-02-01 DIAGNOSIS — I27.82 CHRONIC PULMONARY EMBOLISM WITHOUT ACUTE COR PULMONALE, UNSPECIFIED PULMONARY EMBOLISM TYPE (H): Primary | ICD-10-CM

## 2022-02-01 PROCEDURE — 99239 HOSP IP/OBS DSCHRG MGMT >30: CPT | Performed by: INTERNAL MEDICINE

## 2022-02-01 PROCEDURE — 250N000011 HC RX IP 250 OP 636: Performed by: PHYSICIAN ASSISTANT

## 2022-02-01 RX ADMIN — Medication 5 ML: at 00:08

## 2022-02-01 ASSESSMENT — ACTIVITIES OF DAILY LIVING (ADL)
ADLS_ACUITY_SCORE: 8
ADLS_ACUITY_SCORE: 8

## 2022-02-01 NOTE — PLAN OF CARE
DISCHARGE:  Patient with orders to discharge to home.    Education Provided:   LDAs Port-a-cath de-accessed.    Pt to  her meds at the clinic in the morning. Left at 12:50am.

## 2022-02-01 NOTE — PLAN OF CARE
Neuro: A&Ox4. No new neuro deficit.   Cardiac: No telemetry monitoring    Respiratory: no O2 sat   GI/: Adequate urine output.   Diet/appetite: Tolerating reg diet. Good appetite.   Activity:  Independent.   Pain: At acceptable level on current regimen.   Skin: No new deficits noted.  LDA's: R chest port    Plan: Deaccess port before discharge. Continue with POC. Notify primary team with changes.

## 2022-02-01 NOTE — TELEPHONE ENCOUNTER
ANTICOAGULATION  MANAGEMENT: Discharge Review    Jennifer Cervantes chart reviewed for anticoagulation continuity of care    Hospital Admission on 1/29/22-2/1/22 for sickle cell pain crisis, pleuritic chest pain and subtherapeutic anticoagulation.    Discharge disposition: Home    Results:    Recent labs: (last 7 days)     01/29/22  0514 01/30/22  0648 01/31/22  0650 01/31/22  1418   INR 1.24* 1.23* 1.21*  --    ALMWH  --   --   --  0.70     Anticoagulation inpatient management:     more warfarin administered than maintenance regimen    Started on enoxaparin ANTICOAGULANT 80 MG/0.8ML Subcutaneous every  12 hours    Anticoagulation discharge instructions:     Warfarin dosing: increase dose to  30 mg a day (6 tabs)  Bridging: bridging with enoxaparin (Lovenox) enoxaparin    ANTICOAGULANT 80 MG/0.8ML Subcutaneous every 12 hours   INR goal change: No      Medication changes affecting anticoagulation: No    Additional factors affecting anticoagulation: Yes: sickle cell pain     Plan     Recommend to check INR on 2/2/22 per discharge instructions     You will be discharged on 30 mg (6 tabs) of coumadin a day. You will need routine INR checks (next on 2/2) until you are therapeutic. The lovenox shots will prevent new clots until your INR is in the correct range. Continue taking until you are told it is  safe to stop. Daily INR checks until INR therapeutic per Dr. Duncan.     Patient not contacted    Anticoagulation Calendar updated    PRINCESS MORENO RN

## 2022-02-01 NOTE — PLAN OF CARE
"/79 (BP Location: Left arm)   Pulse 101   Temp 98.3  F (36.8  C) (Oral)   Resp 16   Ht 1.626 m (5' 4\")   Wt 76.9 kg (169 lb 9.6 oz)   SpO2 92%   BMI 29.11 kg/m      Shift: 0700 - 1930  VS: Stable on RA, afebrile  Neuro: AOx4  Labs: Albumin: 3.2, Hgb: 6.6 (Team aware)  Respiratory: Pt denies dyspnea, no cough noted  Pain/Nausea/PRN: PT denies nausea. Oxycodone x1  Diet: Regular diet  LDA: Chest port (Infusing deferoxamine)  GI/: LBM: 1/31, Voiding without difficulty  Skin: No new findings this shift  Mobility: UAL  Plan: Pt will likely discharge tomorrow morning.     Will give report to oncoming nurse. Pt left in stable condition, care relinquished at this time.     "

## 2022-02-01 NOTE — PROGRESS NOTES
Received vm from [facility] if ok to resume desferal infusion on 2/4, pt just discharged 2/1 and last infusion was 1/21.    Per Dr. Duncan: ok to resume. Called [facility] back and reached brennon baptiste with above order to resume.

## 2022-02-01 NOTE — PROVIDER NOTIFICATION
7a adelaide hong  discharging soon, can u put heparin flush for deaccessing the port a cath - 100units/ml (yellow cap).   [phone] RN

## 2022-02-02 ENCOUNTER — PATIENT OUTREACH (OUTPATIENT)
Dept: CARE COORDINATION | Facility: CLINIC | Age: 22
End: 2022-02-02
Payer: COMMERCIAL

## 2022-02-02 ENCOUNTER — HOME INFUSION (PRE-WILLOW HOME INFUSION) (OUTPATIENT)
Dept: PHARMACY | Facility: CLINIC | Age: 22
End: 2022-02-02
Payer: COMMERCIAL

## 2022-02-02 ENCOUNTER — TELEPHONE (OUTPATIENT)
Dept: ANTICOAGULATION | Facility: CLINIC | Age: 22
End: 2022-02-02
Payer: COMMERCIAL

## 2022-02-02 ENCOUNTER — PATIENT OUTREACH (OUTPATIENT)
Dept: ONCOLOGY | Facility: CLINIC | Age: 22
End: 2022-02-02
Payer: COMMERCIAL

## 2022-02-02 DIAGNOSIS — Z71.89 OTHER SPECIFIED COUNSELING: ICD-10-CM

## 2022-02-02 NOTE — TELEPHONE ENCOUNTER
ANTICOAGULATION     Jennifer PATY Ferrells is overdue for INR check.      Spoke with Jennifer and she reports that she is taking the 6 tabs of warfarin + injection. Writer reminded patient that Dr. Duncan wanted her to check daily INR's because of current warfarin dose.  Patient reports she will have an INR done tomorrow.     Gita Khan RN

## 2022-02-02 NOTE — PROGRESS NOTES
Clinic Care Coordination Contact  [facility]/Voicemail       Clinical Data: Care Coordinator Outreach  Outreach attempted x 1.  Left message on patient's voicemail with call back information and requested return call.  Plan: Care Coordinator will try to reach patient again in 1-2 business days.      KHALIDA Richards  [phone]  [facility]

## 2022-02-02 NOTE — PROGRESS NOTES
Spoke with pt to follow up on hospitalization. She states she is doing OK today and has no immediate needs.   Advised that she should have a visit next week with an ANDREAS.  She is agreeable to that and would want to see Andrei Machado virtually.  I will send a message to scheduling. She is best reachable by phone.

## 2022-02-03 ENCOUNTER — HOME INFUSION (PRE-WILLOW HOME INFUSION) (OUTPATIENT)
Dept: PHARMACY | Facility: CLINIC | Age: 22
End: 2022-02-03
Payer: COMMERCIAL

## 2022-02-03 ENCOUNTER — PATIENT OUTREACH (OUTPATIENT)
Dept: CARE COORDINATION | Facility: CLINIC | Age: 22
End: 2022-02-03
Payer: COMMERCIAL

## 2022-02-03 ENCOUNTER — INFUSION THERAPY VISIT (OUTPATIENT)
Dept: INFUSION THERAPY | Facility: CLINIC | Age: 22
End: 2022-02-03
Attending: PEDIATRICS
Payer: COMMERCIAL

## 2022-02-03 ENCOUNTER — TELEPHONE (OUTPATIENT)
Dept: ONCOLOGY | Facility: CLINIC | Age: 22
End: 2022-02-03
Payer: COMMERCIAL

## 2022-02-03 VITALS
TEMPERATURE: 98.5 F | DIASTOLIC BLOOD PRESSURE: 77 MMHG | OXYGEN SATURATION: 97 % | RESPIRATION RATE: 16 BRPM | HEART RATE: 121 BPM | SYSTOLIC BLOOD PRESSURE: 126 MMHG

## 2022-02-03 DIAGNOSIS — D57.00 SICKLE CELL PAIN CRISIS (H): ICD-10-CM

## 2022-02-03 DIAGNOSIS — G81.10 SPASTIC HEMIPLEGIA, UNSPECIFIED ETIOLOGY, UNSPECIFIED LATERALITY (H): Primary | ICD-10-CM

## 2022-02-03 LAB
BACTERIA BLD CULT: NO GROWTH
BACTERIA BLD CULT: NO GROWTH

## 2022-02-03 PROCEDURE — 250N000011 HC RX IP 250 OP 636: Performed by: PEDIATRICS

## 2022-02-03 PROCEDURE — 96376 TX/PRO/DX INJ SAME DRUG ADON: CPT

## 2022-02-03 PROCEDURE — 96361 HYDRATE IV INFUSION ADD-ON: CPT

## 2022-02-03 PROCEDURE — 258N000003 HC RX IP 258 OP 636: Performed by: PEDIATRICS

## 2022-02-03 PROCEDURE — 96374 THER/PROPH/DIAG INJ IV PUSH: CPT

## 2022-02-03 RX ORDER — HEPARIN SODIUM (PORCINE) LOCK FLUSH IV SOLN 100 UNIT/ML 100 UNIT/ML
5 SOLUTION INTRAVENOUS
Status: CANCELLED | OUTPATIENT
Start: 2022-04-01

## 2022-02-03 RX ORDER — HEPARIN SODIUM,PORCINE 10 UNIT/ML
5 VIAL (ML) INTRAVENOUS
Status: CANCELLED | OUTPATIENT
Start: 2022-04-01

## 2022-02-03 RX ORDER — MORPHINE SULFATE 2 MG/ML
2 INJECTION, SOLUTION INTRAMUSCULAR; INTRAVENOUS
Status: DISCONTINUED | OUTPATIENT
Start: 2022-02-03 | End: 2022-02-03 | Stop reason: HOSPADM

## 2022-02-03 RX ORDER — DIPHENHYDRAMINE HCL 25 MG
25 CAPSULE ORAL
Status: CANCELLED
Start: 2022-04-01

## 2022-02-03 RX ORDER — OXYCODONE HYDROCHLORIDE 15 MG/1
15 TABLET ORAL EVERY 4 HOURS PRN
Qty: 45 TABLET | Refills: 0 | Status: SHIPPED | OUTPATIENT
Start: 2022-02-03 | End: 2022-02-11

## 2022-02-03 RX ORDER — HEPARIN SODIUM,PORCINE 10 UNIT/ML
5 VIAL (ML) INTRAVENOUS
Status: DISCONTINUED | OUTPATIENT
Start: 2022-02-03 | End: 2022-02-03 | Stop reason: HOSPADM

## 2022-02-03 RX ORDER — HEPARIN SODIUM (PORCINE) LOCK FLUSH IV SOLN 100 UNIT/ML 100 UNIT/ML
5 SOLUTION INTRAVENOUS
Status: DISCONTINUED | OUTPATIENT
Start: 2022-02-03 | End: 2022-02-03 | Stop reason: HOSPADM

## 2022-02-03 RX ORDER — MORPHINE SULFATE 15 MG/1
15 TABLET, FILM COATED, EXTENDED RELEASE ORAL EVERY 12 HOURS
Qty: 60 TABLET | Refills: 0 | Status: SHIPPED | OUTPATIENT
Start: 2022-02-03 | End: 2022-03-21

## 2022-02-03 RX ORDER — MORPHINE SULFATE 2 MG/ML
2 INJECTION, SOLUTION INTRAMUSCULAR; INTRAVENOUS
Status: CANCELLED
Start: 2022-04-01

## 2022-02-03 RX ORDER — ONDANSETRON 8 MG/1
8 TABLET, FILM COATED ORAL
Status: CANCELLED
Start: 2022-04-01

## 2022-02-03 RX ADMIN — MORPHINE SULFATE 2 MG: 2 INJECTION, SOLUTION INTRAMUSCULAR; INTRAVENOUS at 15:43

## 2022-02-03 RX ADMIN — MORPHINE SULFATE 2 MG: 2 INJECTION, SOLUTION INTRAMUSCULAR; INTRAVENOUS at 14:47

## 2022-02-03 RX ADMIN — Medication 5 ML: at 16:00

## 2022-02-03 RX ADMIN — MORPHINE SULFATE 2 MG: 2 INJECTION, SOLUTION INTRAMUSCULAR; INTRAVENOUS at 13:40

## 2022-02-03 RX ADMIN — DEXTROSE AND SODIUM CHLORIDE 1000 ML: 5; 450 INJECTION, SOLUTION INTRAVENOUS at 13:37

## 2022-02-03 NOTE — PROGRESS NOTES
Infusion Nursing Note:  Jennifer Cervantes presents today for IV fluids and pain medications.    Patient seen by provider today: No   present during visit today: Not Applicable.    Note: Patient is having significant pain in her back today. She said that the only change for her lately is that she is on lovenox for blood clots.    Intravenous Access:  Implanted Port.    Treatment Conditions:  Not Applicable.    Post Infusion Assessment:  Patient tolerated infusion without incident.  Blood return noted pre and post infusion.  Site patent and intact, free from redness, edema or discomfort.  Access discontinued per protocol.     Discharge Plan:   [facility]  Patient will return as needed for next appointment.   Patient discharged in stable condition accompanied by: self.  Departure Mode: Ambulatory.    Administrations This Visit     dextrose 5% and 0.45% NaCl BOLUS     Admin Date  02/03/2022 Action  New Bag Dose  1,000 mL Rate  500 mL/hr Route  Intravenous Administered By  Cristela Henry RN          heparin 100 UNIT/ML injection 5 mL     Admin Date  02/03/2022 Action  Given Dose  5 mL Route  Intracatheter Administered By  Neyda Sheriff RN          morphine (PF) injection 2 mg     Admin Date  02/03/2022 Action  Given Dose  2 mg Route  Intravenous Administered By  Cristela Henry RN           Admin Date  02/03/2022 Action  Given Dose  2 mg Route  Intravenous Administered By  Cristela Henry RN           Admin Date  02/03/2022 Action  Given Dose  2 mg Route  Intravenous Administered By  Cristela Henry RN Alyssa Marie Sakhitab-Kerestes, RN

## 2022-02-03 NOTE — PROGRESS NOTES
Social Work Note: Telephone Call  Oncology Clinic     Data/Intervention:  Patient Name:  Jennifer Cervantes  /Age: 2000, 21 years old     Call From: [facility]        Reason for Call:  Transportation     Assessment:   called [employer] ([phone]) to arrange ride through patient's insurance. HouseCall Ride arranged  for patient from home with [employer] ([phone]).  Patient will need to call when ready for return ride home ([phone]).      Plan:  Patient is aware of the transportation plan.  available to assist with any other needs.      CARLOS Chavez,[facility]  Hematology/Oncology Social Worker  Phone:[phone] Pager: [phone]

## 2022-02-03 NOTE — LETTER
2/3/2022         RE: Jennifer Cervantes  [address]  [place] [address]        Dear Colleague,    Thank you for referring your patient, Jennifer Cervantes, to the [facility]. Please see a copy of my visit note below.    Infusion Nursing Note:  Jennifer Cervantes presents today for IV fluids and pain medications.    Patient seen by provider today: No   present during visit today: Not Applicable.    Note: Patient is having significant pain in her back today. She said that the only change for her lately is that she is on lovenox for blood clots.    Intravenous Access:  Implanted Port.    Treatment Conditions:  Not Applicable.    Post Infusion Assessment:  Patient tolerated infusion without incident.  Blood return noted pre and post infusion.  Site patent and intact, free from redness, edema or discomfort.  Access discontinued per protocol.     Discharge Plan:   AVS to patient via MYCHART.  Patient will return as needed for next appointment.   Patient discharged in stable condition accompanied by: self.  Departure Mode: Ambulatory.    Administrations This Visit     dextrose 5% and 0.45% NaCl BOLUS     Admin Date  02/03/2022 Action  New Bag Dose  1,000 mL Rate  500 mL/hr Route  Intravenous Administered By  Cristela Henry RN          heparin 100 UNIT/ML injection 5 mL     Admin Date  02/03/2022 Action  Given Dose  5 mL Route  Intracatheter Administered By  Neyda Sheriff RN          morphine (PF) injection 2 mg     Admin Date  02/03/2022 Action  Given Dose  2 mg Route  Intravenous Administered By  Cristela Henry RN           Admin Date  02/03/2022 Action  Given Dose  2 mg Route  Intravenous Administered By  Cristela Henry RN           Admin Date  02/03/2022 Action  Given Dose  2 mg Route  Intravenous Administered By  Cristela Henry RN Alyssa Marie Sakhitab-Kerestes  RN                        Again, thank you for allowing me to participate in the care of your patient.        Sincerely,        [facility]

## 2022-02-03 NOTE — TELEPHONE ENCOUNTER
[facility] Telephone Triage Note    The following symptoms were reported:   Typical  Description:            Onset:  Last night  Location: Back  Character: Sharp           Intensity: 8/10    Accompanying Signs & Symptoms:  No tingling, swelling, discoloration of extremities   Chest Pain:  denies  Shortness of Breath:  denies     Fever:  denies     Chills:  denies   Cough/sore throat:  denies  Other:  Needs transportation  Narcotic Refill Request  Medication(s) requested:  MS Contin 15mgCR  and Oxycodone 15mg   Person Requesting Refill:Jennifer pt  What pain is the medication treating: Sickle cell pain  How is the medication being taken?:as ordered  Does pt have enough for today? none  Is pain being adequately controlled on the current regimen?: okay, requires IVF/Painappts at times  Experiencing any side effects from medication?: Denies    Date of most recent appointment:  Recently hospitalized on 1/21/22, Last outpt provider appt on 1/21/22 Andrei HIGGINS  Any No Show Visits:none recently  Next appointment:   2/8/22 Andrei HIGGINS  Last fill date and by whom:   MS Contin on 12/20/21 Dr Duncan  Oxycodone on 1/24/22 Andrei Patiño   Reviewed:    Routed to Dr. Duncan    Therapies Tried and outcome: MSContin and Oxycodone, last oxy taken 1 tablet taken at 6am.     Improved by: heat/hot shower with temporary relief.     The following provider was consulted:  Meets Protocol    The following advice/orders were given, and/or interventions recommended:    appt available for 1:30pm     [address] notified for transportation needs  Patient instructions and/or follow up:  This writer called and relayed information to Pt. Asked pt to repeat back info/plan.  Pt was able to reverbalize understanding.    Scheduling notified.

## 2022-02-03 NOTE — PROGRESS NOTES
Clinic Care Coordination Contact  [facility]/Voicemail       Clinical Data: Care Coordinator Outreach  Outreach attempted x 2.  Left message on patient's voicemail with call back information and requested return call.  Plan: Care Coordinator will do no further outreaches at this time.          KHALIDA Richards  [phone]  [facility] Resource [facility]

## 2022-02-03 NOTE — PATIENT INSTRUCTIONS
Dear Jennifer Cervantes    Thank you for choosing [facility] ([facility]) for your infusion.  The following information is a summary of our appointment as well as important reminders.      We look forward in seeing you on your next appointment here at [facility] ([facility]).  Please don t hesitate to call us at [phone] to reschedule any of your appointments or to speak with one of the [facility] registered nurses.  It was a pleasure taking care of you today.    Sincerely,    [facility]  [address] [place] [address]  [address]  Phone:  [phone]

## 2022-02-04 ENCOUNTER — HOSPITAL ENCOUNTER (EMERGENCY)
Facility: CLINIC | Age: 22
Discharge: HOME | End: 2022-02-04
Attending: EMERGENCY MEDICINE | Admitting: EMERGENCY MEDICINE
Payer: COMMERCIAL

## 2022-02-04 ENCOUNTER — TELEPHONE (OUTPATIENT)
Dept: ONCOLOGY | Facility: CLINIC | Age: 22
End: 2022-02-04
Payer: COMMERCIAL

## 2022-02-04 ENCOUNTER — PATIENT OUTREACH (OUTPATIENT)
Dept: CARE COORDINATION | Facility: CLINIC | Age: 22
End: 2022-02-04
Payer: COMMERCIAL

## 2022-02-04 ENCOUNTER — APPOINTMENT (OUTPATIENT)
Dept: CT IMAGING | Facility: CLINIC | Age: 22
End: 2022-02-04
Attending: EMERGENCY MEDICINE
Payer: COMMERCIAL

## 2022-02-04 ENCOUNTER — TELEPHONE (OUTPATIENT)
Dept: ANTICOAGULATION | Facility: CLINIC | Age: 22
End: 2022-02-04

## 2022-02-04 ENCOUNTER — INFUSION THERAPY VISIT (OUTPATIENT)
Dept: ONCOLOGY | Facility: CLINIC | Age: 22
End: 2022-02-04
Attending: PHYSICIAN ASSISTANT
Payer: COMMERCIAL

## 2022-02-04 VITALS
HEIGHT: 64 IN | RESPIRATION RATE: 16 BRPM | TEMPERATURE: 98.8 F | HEART RATE: 99 BPM | WEIGHT: 173.6 LBS | BODY MASS INDEX: 29.64 KG/M2 | OXYGEN SATURATION: 98 % | DIASTOLIC BLOOD PRESSURE: 90 MMHG | SYSTOLIC BLOOD PRESSURE: 135 MMHG

## 2022-02-04 VITALS
DIASTOLIC BLOOD PRESSURE: 90 MMHG | RESPIRATION RATE: 18 BRPM | HEART RATE: 116 BPM | TEMPERATURE: 98.5 F | SYSTOLIC BLOOD PRESSURE: 129 MMHG | OXYGEN SATURATION: 95 %

## 2022-02-04 DIAGNOSIS — D57.00 SICKLE CELL PAIN CRISIS (H): ICD-10-CM

## 2022-02-04 DIAGNOSIS — S09.90XA INJURY OF HEAD, INITIAL ENCOUNTER: ICD-10-CM

## 2022-02-04 DIAGNOSIS — I27.82 CHRONIC PULMONARY EMBOLISM WITHOUT ACUTE COR PULMONALE, UNSPECIFIED PULMONARY EMBOLISM TYPE (H): Primary | ICD-10-CM

## 2022-02-04 DIAGNOSIS — Z79.01 ANTICOAGULATED: ICD-10-CM

## 2022-02-04 DIAGNOSIS — G81.10 SPASTIC HEMIPLEGIA, UNSPECIFIED ETIOLOGY, UNSPECIFIED LATERALITY (H): Primary | ICD-10-CM

## 2022-02-04 LAB
ALBUMIN SERPL-MCNC: 4.1 G/DL (ref 3.4–5)
ALP SERPL-CCNC: 93 U/L (ref 40–150)
ALT SERPL W P-5'-P-CCNC: 71 U/L (ref 0–50)
ANION GAP SERPL CALCULATED.3IONS-SCNC: 5 MMOL/L (ref 3–14)
AST SERPL W P-5'-P-CCNC: 79 U/L (ref 0–45)
BASOPHILS # BLD AUTO: 0.2 10E3/UL (ref 0–0.2)
BASOPHILS NFR BLD AUTO: 2 %
BILIRUB SERPL-MCNC: 2 MG/DL (ref 0.2–1.3)
BUN SERPL-MCNC: 7 MG/DL (ref 7–30)
CALCIUM SERPL-MCNC: 9 MG/DL (ref 8.5–10.1)
CHLORIDE BLD-SCNC: 109 MMOL/L (ref 94–109)
CO2 SERPL-SCNC: 21 MMOL/L (ref 20–32)
CREAT SERPL-MCNC: 0.48 MG/DL (ref 0.52–1.04)
EOSINOPHIL # BLD AUTO: 0.4 10E3/UL (ref 0–0.7)
EOSINOPHIL NFR BLD AUTO: 3 %
ERYTHROCYTE [DISTWIDTH] IN BLOOD BY AUTOMATED COUNT: 27.3 % (ref 10–15)
GFR SERPL CREATININE-BSD FRML MDRD: >90 ML/MIN/1.73M2
GLUCOSE BLD-MCNC: 101 MG/DL (ref 70–99)
HCT VFR BLD AUTO: 22.1 % (ref 35–47)
HGB BLD-MCNC: 7.7 G/DL (ref 11.7–15.7)
HOLD SPECIMEN: NORMAL
IMM GRANULOCYTES # BLD: 0.2 10E3/UL
IMM GRANULOCYTES NFR BLD: 2 %
INR PPP: 1.36 (ref 0.85–1.15)
LACTATE SERPL-SCNC: 1 MMOL/L (ref 0.7–2)
LYMPHOCYTES # BLD AUTO: 2.7 10E3/UL (ref 0.8–5.3)
LYMPHOCYTES NFR BLD AUTO: 17 %
MCH RBC QN AUTO: 29.1 PG (ref 26.5–33)
MCHC RBC AUTO-ENTMCNC: 34.8 G/DL (ref 31.5–36.5)
MCV RBC AUTO: 83 FL (ref 78–100)
MONOCYTES # BLD AUTO: 1.1 10E3/UL (ref 0–1.3)
MONOCYTES NFR BLD AUTO: 7 %
NEUTROPHILS # BLD AUTO: 10.8 10E3/UL (ref 1.6–8.3)
NEUTROPHILS NFR BLD AUTO: 69 %
NRBC # BLD AUTO: 0.7 10E3/UL
NRBC BLD AUTO-RTO: 5 /100
PLATELET # BLD AUTO: 521 10E3/UL (ref 150–450)
POTASSIUM BLD-SCNC: 3.9 MMOL/L (ref 3.4–5.3)
PROT SERPL-MCNC: 8.6 G/DL (ref 6.8–8.8)
RBC # BLD AUTO: 2.65 10E6/UL (ref 3.8–5.2)
RETICS # AUTO: 2.65 10E6/UL (ref 0.03–0.1)
RETICS/RBC NFR AUTO: 15.8 % (ref 0.5–2)
SODIUM SERPL-SCNC: 135 MMOL/L (ref 133–144)
WBC # BLD AUTO: 15.4 10E3/UL (ref 4–11)

## 2022-02-04 PROCEDURE — 85045 AUTOMATED RETICULOCYTE COUNT: CPT | Performed by: EMERGENCY MEDICINE

## 2022-02-04 PROCEDURE — 70450 CT HEAD/BRAIN W/O DYE: CPT | Mod: 26 | Performed by: RADIOLOGY

## 2022-02-04 PROCEDURE — 99285 EMERGENCY DEPT VISIT HI MDM: CPT | Mod: 25 | Performed by: EMERGENCY MEDICINE

## 2022-02-04 PROCEDURE — 82040 ASSAY OF SERUM ALBUMIN: CPT | Performed by: EMERGENCY MEDICINE

## 2022-02-04 PROCEDURE — 96361 HYDRATE IV INFUSION ADD-ON: CPT | Performed by: EMERGENCY MEDICINE

## 2022-02-04 PROCEDURE — 96375 TX/PRO/DX INJ NEW DRUG ADDON: CPT | Performed by: EMERGENCY MEDICINE

## 2022-02-04 PROCEDURE — 36415 COLL VENOUS BLD VENIPUNCTURE: CPT | Performed by: EMERGENCY MEDICINE

## 2022-02-04 PROCEDURE — 258N000003 HC RX IP 258 OP 636: Performed by: EMERGENCY MEDICINE

## 2022-02-04 PROCEDURE — 96374 THER/PROPH/DIAG INJ IV PUSH: CPT | Performed by: EMERGENCY MEDICINE

## 2022-02-04 PROCEDURE — 85610 PROTHROMBIN TIME: CPT | Performed by: EMERGENCY MEDICINE

## 2022-02-04 PROCEDURE — 83605 ASSAY OF LACTIC ACID: CPT | Performed by: EMERGENCY MEDICINE

## 2022-02-04 PROCEDURE — 85014 HEMATOCRIT: CPT | Performed by: EMERGENCY MEDICINE

## 2022-02-04 PROCEDURE — 80053 COMPREHEN METABOLIC PANEL: CPT | Performed by: EMERGENCY MEDICINE

## 2022-02-04 PROCEDURE — 99285 EMERGENCY DEPT VISIT HI MDM: CPT | Performed by: EMERGENCY MEDICINE

## 2022-02-04 PROCEDURE — 250N000009 HC RX 250: Performed by: EMERGENCY MEDICINE

## 2022-02-04 PROCEDURE — 250N000011 HC RX IP 250 OP 636: Performed by: PEDIATRICS

## 2022-02-04 PROCEDURE — 250N000013 HC RX MED GY IP 250 OP 250 PS 637: Performed by: EMERGENCY MEDICINE

## 2022-02-04 PROCEDURE — 250N000011 HC RX IP 250 OP 636: Performed by: EMERGENCY MEDICINE

## 2022-02-04 PROCEDURE — 70450 CT HEAD/BRAIN W/O DYE: CPT

## 2022-02-04 RX ORDER — DIPHENHYDRAMINE HCL 25 MG
25 CAPSULE ORAL
Status: CANCELLED
Start: 2022-04-01

## 2022-02-04 RX ORDER — MORPHINE SULFATE 2 MG/ML
2 INJECTION, SOLUTION INTRAMUSCULAR; INTRAVENOUS
Status: DISCONTINUED | OUTPATIENT
Start: 2022-02-04 | End: 2022-02-04 | Stop reason: HOSPADM

## 2022-02-04 RX ORDER — KETOROLAC TROMETHAMINE 15 MG/ML
15 INJECTION, SOLUTION INTRAMUSCULAR; INTRAVENOUS ONCE
Status: COMPLETED | OUTPATIENT
Start: 2022-02-04 | End: 2022-02-04

## 2022-02-04 RX ORDER — HEPARIN SODIUM,PORCINE 10 UNIT/ML
5 VIAL (ML) INTRAVENOUS
Status: CANCELLED | OUTPATIENT
Start: 2022-04-01

## 2022-02-04 RX ORDER — HEPARIN SODIUM (PORCINE) LOCK FLUSH IV SOLN 100 UNIT/ML 100 UNIT/ML
5 SOLUTION INTRAVENOUS
Status: DISCONTINUED | OUTPATIENT
Start: 2022-02-04 | End: 2022-02-04 | Stop reason: HOSPADM

## 2022-02-04 RX ORDER — HEPARIN SODIUM (PORCINE) LOCK FLUSH IV SOLN 100 UNIT/ML 100 UNIT/ML
5-10 SOLUTION INTRAVENOUS
Status: DISCONTINUED | OUTPATIENT
Start: 2022-02-04 | End: 2022-02-04 | Stop reason: HOSPADM

## 2022-02-04 RX ORDER — ONDANSETRON 8 MG/1
8 TABLET, FILM COATED ORAL
Status: CANCELLED
Start: 2022-04-01

## 2022-02-04 RX ORDER — HEPARIN SODIUM (PORCINE) LOCK FLUSH IV SOLN 100 UNIT/ML 100 UNIT/ML
5 SOLUTION INTRAVENOUS
Status: CANCELLED | OUTPATIENT
Start: 2022-04-01

## 2022-02-04 RX ORDER — HEPARIN SODIUM,PORCINE 10 UNIT/ML
5 VIAL (ML) INTRAVENOUS
Status: DISCONTINUED | OUTPATIENT
Start: 2022-02-04 | End: 2022-02-04 | Stop reason: HOSPADM

## 2022-02-04 RX ORDER — DIPHENHYDRAMINE HCL 25 MG
25 CAPSULE ORAL
Status: DISCONTINUED | OUTPATIENT
Start: 2022-02-04 | End: 2022-02-04 | Stop reason: HOSPADM

## 2022-02-04 RX ORDER — MORPHINE SULFATE 2 MG/ML
2 INJECTION, SOLUTION INTRAMUSCULAR; INTRAVENOUS
Status: CANCELLED
Start: 2022-04-01

## 2022-02-04 RX ORDER — SODIUM CHLORIDE 9 MG/ML
INJECTION, SOLUTION INTRAVENOUS CONTINUOUS
Status: DISCONTINUED | OUTPATIENT
Start: 2022-02-04 | End: 2022-02-04 | Stop reason: HOSPADM

## 2022-02-04 RX ORDER — ONDANSETRON 8 MG/1
8 TABLET, ORALLY DISINTEGRATING ORAL
Status: DISCONTINUED | OUTPATIENT
Start: 2022-02-04 | End: 2022-02-04 | Stop reason: HOSPADM

## 2022-02-04 RX ADMIN — Medication 4 MG: at 15:04

## 2022-02-04 RX ADMIN — KETOROLAC TROMETHAMINE 15 MG: 15 INJECTION, SOLUTION INTRAMUSCULAR; INTRAVENOUS at 13:01

## 2022-02-04 RX ADMIN — SODIUM CHLORIDE 1000 ML: 9 INJECTION, SOLUTION INTRAVENOUS at 13:01

## 2022-02-04 RX ADMIN — Medication 5 ML: at 10:38

## 2022-02-04 RX ADMIN — HEPARIN 5 ML: 100 SYRINGE at 15:50

## 2022-02-04 RX ADMIN — OXYCODONE HYDROCHLORIDE 15 MG: 10 TABLET ORAL at 12:57

## 2022-02-04 ASSESSMENT — ENCOUNTER SYMPTOMS
NUMBNESS: 0
NAUSEA: 0
VOMITING: 0
BACK PAIN: 1
WEAKNESS: 0
FEVER: 0
SHORTNESS OF BREATH: 0
HEADACHES: 1

## 2022-02-04 ASSESSMENT — PAIN SCALES - GENERAL: PAINLEVEL: EXTREME PAIN (9)

## 2022-02-04 ASSESSMENT — MIFFLIN-ST. JEOR: SCORE: 1532.44

## 2022-02-04 NOTE — TELEPHONE ENCOUNTER
ANTICOAGULATION  MANAGEMENT: Discharge Review    Jennifer Cervantes chart reviewed for anticoagulation continuity of care    Emergency room visit on 2/4/22 for a fall.  Your head CT showed no evidence of bleed. Because you are  anticoagulated you are still at risk for delayed head bleeds. If you  develop significant headache, vomiting, any neurologic changes or  confusion please return to the ER immediately as that could be the sign  of delayed head bleed. Please make sure that you are with your mom or  another adult for the remainder of the day/tomorrow, someone who  can monitor your mental status and call 911 if you develop any signs or  symptoms of head bleed.    Discharge disposition: Home    Results:    Recent labs: (last 7 days)     01/29/22  0514 01/30/22  0648 01/31/22  0650 01/31/22  1418 02/04/22  1302   INR 1.24* 1.23* 1.21*  --  1.36*   ALMWH  --   --   --  0.70  --      Anticoagulation inpatient management:     home regimen continued    Anticoagulation discharge instructions:     Warfarin dosing: home regimen continued                30mg Warfarin (Coumadin ) daily     Bridging: bridging with enoxaparin (Lovenox) 80mg/0.8ML every 12 hours     INR goal change: No      Medication changes affecting anticoagulation: No    Additional factors affecting anticoagulation: Yes: Hit head when she fell.    Plan     No adjustment to anticoagulation plan needed    Left a detailed message for Jennifer    No adjustment to Anticoagulation Calendar was required    Prince Collins, RN

## 2022-02-04 NOTE — ED NOTES
Bed: ED19  Expected date:   Expected time:   Means of arrival:   Comments:  Jamie 416   22 F  Fall with head strike  Anticoagulated with warfarin  ETA 1100

## 2022-02-04 NOTE — ED PROVIDER NOTES
[place] EMERGENCY DEPARTMENT ([facility])  2/04/22    History     Chief Complaint   Patient presents with     Fall     The history is provided by the patient and medical records.     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 Patient reports she had an appointment at the infusion clinic for pain medication and IV fluids this morning. She states when she was walking out of her house to go to the appointment she slipped and fell on the ice, hitting her head. She denies syncope. Patient reports she told the nurse at the infusion center this happened and after consulting with the physician they advised patient to come here to the ED. Patient is on anticoagulation. She states she is taking this as prescribed and has not missed any doses. Patient notes she was going to the infusion center due to sickle cell flare of low back pain. She states this is typical of her sickle cell pain. She did not receive any infusions prior to coming to the ED. Patient reports she now has a headache. She denies other injury from the fall. She was able to get up and walk on her own. Patient denies numbness, tingling, or weakness in the lower extremities. No change in bowel or bladder habits. She denies saddle anesthesia. No fevers, chest pain, shortness of breath, nausea, or vomiting.    Past Medical History  Past Medical History:   Diagnosis Date     Anxiety      Bleeding disorder (H)      Blood clotting disorder (H)      Cerebral infarction (H) 2015     Cognitive developmental delay     low IQ. 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 side (H)     right hand contractures      Iron overload due to repeated red blood cell transfusions      Migraines      Multiple subsegmental pulmonary emboli without acute cor pulmonale (H) 02/01/2021     Oppositional defiant behavior      Superficial venous thrombosis of arm, right 03/25/2021     Uncomplicated asthma      Past Surgical History:   Procedure Laterality Date     AS INSERT TUNNELED CV 2 CATH W/O PORT/PUMP       CHOLECYSTECTOMY       CV RIGHT HEART CATH MEASUREMENTS RECORDED N/A 11/18/2021    Procedure: Right Heart Cath;  Surgeon: Jackson Stauffer MD;  Location:  HEART CARDIAC CATH LAB     INSERT PORT VASCULAR ACCESS Left 4/21/2021    Procedure: INSERTION, VASCULAR ACCESS PORT (NOT SURE ON SIDE UNTIL REMOVAL);  Surgeon: Rajan More MD;  Location: [facility] OR     IR CHEST PORT PLACEMENT > 5 YRS OF AGE  4/21/2021     IR CVC NON TUNNEL LINE REMOVAL  5/6/2021     IR CVC NON TUNNEL PLACEMENT  04/07/2020     IR CVC NON TUNNEL PLACEMENT  4/30/2021     IR PORT REMOVAL LEFT  4/21/2021     REMOVE PORT VASCULAR ACCESS Left 4/21/2021    Procedure: REMOVAL, VASCULAR ACCESS PORT LEFT;  Surgeon: Rajan More MD;  Location: [facility] OR     REPAIR TENDON ELBOW Right 10/02/2019    Procedure: Right Elbow Flexor Lengthening, Flexor Pronator Slide Of Wrist and Finger, Thumb Adductor Lengthening;  Surgeon: Anai Franco MD;  Location: [facility] OR     TONSILLECTOMY Bilateral 10/02/2019    Procedure: Bilateral Tonsillectomy;  Surgeon: Farhana Guy MD;  Location: [facility] OR     ZZC BREAST REDUCTION (INCLUDES LIPO) TIER 3 Bilateral 04/23/2019     enoxaparin ANTICOAGULANT (LOVENOX) 80 MG/0.8ML syringe  oxyCODONE IR (ROXICODONE) 15 MG tablet  warfarin ANTICOAGULANT (COUMADIN) 5 MG tablet  acetaminophen (TYLENOL) 325 MG tablet  albuterol (PROAIR HFA/PROVENTIL HFA/VENTOLIN HFA) 108 (90 Base) MCG/ACT inhaler  albuterol (PROVENTIL) (2.5 MG/3ML) 0.083% neb solution  budesonide-formoterol (SYMBICORT) 160-4.5 MCG/ACT Inhaler  deferasirox (JADENU) 360 MG  "tablet  diphenhydrAMINE (BENADRYL) 25 MG capsule  EPINEPHrine (ANY BX GENERIC EQUIV) 0.3 MG/0.3ML injection 2-pack  Hydroxyurea 1000 MG TABS  lidocaine-prilocaine (EMLA) 2.5-2.5 % external cream  medroxyPROGESTERone (DEPO-PROVERA) 150 MG/ML IM injection  morphine (MS CONTIN) 15 MG CR tablet  naloxone (NARCAN) 4 MG/0.1ML nasal spray  ondansetron (ZOFRAN) 8 MG tablet      Allergies   Allergen Reactions     Contrast Dye      Hives and breathing issues     Fish-Derived Products Hives     Seafood Hives     Diagnostic X-Ray Materials      Gadolinium      Family History  Family History   Problem Relation Age of Onset     Sickle Cell Trait Mother      Hypertension Mother      Asthma Mother      Sickle Cell Trait Father      Social History   Social History     Tobacco Use     Smoking status: Never Smoker     Smokeless tobacco: Never Used   Substance Use Topics     Alcohol use: Not Currently     Alcohol/week: 0.0 standard drinks     Drug use: Never      Past medical history, past surgical history, medications, allergies, family history, and social history were reviewed with the patient. No additional pertinent items.       Review of Systems   Constitutional: Negative for fever.   Respiratory: Negative for shortness of breath.    Cardiovascular: Negative for chest pain.   Gastrointestinal: Negative for nausea and vomiting.   Musculoskeletal: Positive for back pain (low).   Neurological: Positive for headaches. Negative for syncope, weakness and numbness.   All other systems reviewed and are negative.    A complete review of systems was performed with pertinent positives and negatives noted in the HPI, and all other systems negative.    Physical Exam   BP: (!) 145/90  Pulse: 107  Temp: 98.8  F (37.1  C)  Resp: 16  Height: 162.6 cm (5' 4\")  Weight: 78.7 kg (173 lb 9.6 oz) (bed scale)  SpO2: 95 %  Physical Exam  General: awake, alert, NAD  Head: normal cephalic, atraumatic no lacerations abrasions or goose eggs " appreciated  HEENT: pupils equal, conjugate gaze intact  Neck: Supple, no midline neck tenderness  CV: Mildly tachycardic rate  Lungs: clear to auscultation  Abd: soft, non-tender, no guarding, no peritoneal signs  EXT: Deformity of right upper extremity noted secondary to previous stroke, lower extremities without swelling or edema  Neuro: Cranial nerves 2-12 intact, awake, answers questions appropriately.  Patient has weakness of the right wrist and  which is secondary from an old stroke.  5 out of 5 strength in the other extremities.  No retrograde or anterograde admission.    ED Course   11:20 AM  The patient was seen and examined by Kilo aDi MD in Room ED19.      Procedures       The medical record was reviewed and interpreted.  Current labs reviewed and interpreted.  Previous labs reviewed and interpreted.            Results for orders placed or performed during the hospital encounter of 02/04/22   Head CT w/o contrast     Status: None    Narrative    CT HEAD W/O CONTRAST 2/4/2022 12:11 PM    History: Fall, hit head, on warfarin     Comparison: Brain MRI 10/12/2019    Technique: Using multidetector thin collimation helical acquisition  technique, axial, coronal and sagittal CT images from the skull base  to the vertex were obtained without intravenous contrast.   (topogram) image(s) also obtained and reviewed.    Findings: There is no intracranial hemorrhage, mass effect, or midline  shift. Stable area of encephalomalacia in the left middle cerebral  artery territory involving the left posterior-lateral temporal lobe,  left frontal and temporal operculum and left basal ganglia. Gray/white  matter differentiation in both cerebral hemispheres is preserved.  Ventricles are proportionate to the cerebral sulci. The basal cisterns  are clear.    The bony calvaria and the bones of the skull base are normal. The  visualized portions of the paranasal sinuses and mastoid air cells are  clear.        Impression    Impression:    1. No acute intracranial pathology.  2. Encephalomalacia from left middle cerebral artery infarct,  unchanged.    I have personally reviewed the examination and initial interpretation  and I agree with the findings.    ROMELIA GARCIA MD         SYSTEM ID:  [id number]   Comprehensive metabolic panel     Status: Abnormal   Result Value Ref Range    Sodium 135 133 - 144 mmol/L    Potassium 3.9 3.4 - 5.3 mmol/L    Chloride 109 94 - 109 mmol/L    Carbon Dioxide (CO2) 21 20 - 32 mmol/L    Anion Gap 5 3 - 14 mmol/L    Urea Nitrogen 7 7 - 30 mg/dL    Creatinine 0.48 (L) 0.52 - 1.04 mg/dL    Calcium 9.0 8.5 - 10.1 mg/dL    Glucose 101 (H) 70 - 99 mg/dL    Alkaline Phosphatase 93 40 - 150 U/L    AST 79 (H) 0 - 45 U/L    ALT 71 (H) 0 - 50 U/L    Protein Total 8.6 6.8 - 8.8 g/dL    Albumin 4.1 3.4 - 5.0 g/dL    Bilirubin Total 2.0 (H) 0.2 - 1.3 mg/dL    GFR Estimate >90 >60 mL/min/1.73m2   Reticulocyte count     Status: Abnormal   Result Value Ref Range    % Reticulocyte 15.8 (H) 0.5 - 2.0 %    Absolute Reticulocyte 2.650 (H) 0.025 - 0.095 10e6/uL   INR     Status: Abnormal   Result Value Ref Range    INR 1.36 (H) 0.85 - 1.15   CBC with platelets and differential     Status: Abnormal   Result Value Ref Range    WBC Count 15.4 (H) 4.0 - 11.0 10e3/uL    RBC Count 2.65 (L) 3.80 - 5.20 10e6/uL    Hemoglobin 7.7 (L) 11.7 - 15.7 g/dL    Hematocrit 22.1 (L) 35.0 - 47.0 %    MCV 83 78 - 100 fL    MCH 29.1 26.5 - 33.0 pg    MCHC 34.8 31.5 - 36.5 g/dL    RDW 27.3 (H) 10.0 - 15.0 %    Platelet Count 521 (H) 150 - 450 10e3/uL    % Neutrophils 69 %    % Lymphocytes 17 %    % Monocytes 7 %    % Eosinophils 3 %    % Basophils 2 %    % Immature Granulocytes 2 %    NRBCs per 100 WBC 5 (H) <1 /100    Absolute Neutrophils 10.8 (H) 1.6 - 8.3 10e3/uL    Absolute Lymphocytes 2.7 0.8 - 5.3 10e3/uL    Absolute Monocytes 1.1 0.0 - 1.3 10e3/uL    Absolute Eosinophils 0.4 0.0 - 0.7 10e3/uL    Absolute Basophils 0.2 0.0 - 0.2  10e3/uL    Absolute Immature Granulocytes 0.2 <=0.4 10e3/uL    Absolute NRBCs 0.7 10e3/uL   Extra Red Top Tube     Status: None   Result Value Ref Range    Hold Specimen JIC    Lactic acid whole blood STAT     Status: Normal   Result Value Ref Range    Lactic Acid 1.0 0.7 - 2.0 mmol/L   CBC with platelets differential     Status: Abnormal    Narrative    The following orders were created for panel order CBC with platelets differential.  Procedure                               Abnormality         Status                     ---------                               -----------         ------                     CBC with platelets and d...[[id number]]  Abnormal            Final result                 Please view results for these tests on the individual orders.   [place] Draw     Status: None    Narrative    The following orders were created for panel order [place] Draw.  Procedure                               Abnormality         Status                     ---------                               -----------         ------                     Extra Red Top Tube[[id number]]                               Final result                 Please view results for these tests on the individual orders.     Medications   0.9% sodium chloride BOLUS (0 mLs Intravenous Stopped 2/4/22 1546)     Followed by   sodium chloride 0.9% infusion (has no administration in time range)   heparin 100 UNIT/ML injection 5-10 mL (5 mLs Intracatheter Given 2/4/22 1550)   ketorolac (TORADOL) injection 15 mg (15 mg Intravenous Given 2/4/22 1301)   oxyCODONE (ROXICODONE) tablet 15 mg (15 mg Oral Given 2/4/22 1257)   ketamine (KETALAR) injection 4 mg (4 mg Intravenous Given 2/4/22 1504)        Assessments & Plan (with Medical Decision Making)   Debby Cervantes is a 21-year-old female who presents with 2 complaints.  Complaint #1 is fall, anticoagulated and striking her head.  Patient reports she has been taking her warfarin as prescribed, will check INR and also  obtain head CT to rule out acute intercranial pathology.  Reassuringly on exam she has got a normal mental status, normal neurologic exam, denies any retrograde or antegrade amnesia associated with the event. She denies syncope, no syncopal evaluation necessary.    Patient is also endorsing low back pain for which she was on her way to the infusion center.  She reports this is typical of her chronic sickle cell pain, no new neurologic deficits in the lower extremities associated with this, including no numbness, tingling, weakness, bowel or bladder dysfunction, fevers or other red flag symptoms on exam.  Plan is to get basic labs for sickle cell including a hemoglobin and reticulocyte count.  Patient's denying other potential complications of sickle cell including chest pain or shortness of breath.  Patient's ER plan includes oxycodone, Toradol IV fluids and Zofran.  We will start with these and reassess.    Head CT shows no acute bleed. She is actually subtherapeutic on her INR however she is currently being bridged with enoxaparin. I did discuss with her the risk of delayed bleeds. Her mother is her PCA and is with her 24/7, discussed signs and symptoms of delayed head bleed in her mom will be with her and be able to monitor for this, she knows she needs to return to the ER    She got pain medications for sickle cell pain crisis per ED care plan. Patient's hemoglobin is baseline, she has an appropriate reticulocyte count, does have an elevated white count but this appears baseline for her.    On reassessment she is well-appearing, nontoxic, reports headache improved she has a normal neuro exam with the exception of the known right upper extremity deficits with normal mentation.  Discussed disposition, and what to watch for and she is well aware of this..      This part of the medical record was transcribed by Sisi Alejandra Medical Scribe, from a dictation done by Kilo Cruz MD.     I have reviewed the nursing  notes. I have reviewed the findings, diagnosis, plan and need for follow up with the patient.    Discharge Medication List as of 2/4/2022  3:47 PM          Final diagnoses:   Sickle cell pain crisis (H)   Injury of head, initial encounter   Anticoagulated     I, Sisi Alejandra, am serving as a trained medical scribe to document services personally performed by Kilo Dai MD, based on the provider's statements to me.      I, Kilo Dai MD, was physically present and have reviewed and verified the accuracy of this note documented by Sisi Alejandra.    --  Kilo Dai MD  [facility] EMERGENCY DEPARTMENT  2/4/2022     Kilo Dai MD  02/04/22 9590

## 2022-02-04 NOTE — PROGRESS NOTES
Here as add on for IVF/pain meds.  Rates pain at a 9 in low back.  Took her usual doses of Oxycodone and MS Contin at 0600.      Fell this morning, slipped on ice, while walking to her cab.  Hit back of head, sounds like it was pretty hard.  Currently rates pain at an 8 in back of head.  Has not improved since this happened.  She asks that her head be checked for a concussion.   Denies any current issues with vision changes, dizziness or light headedness.  Difficult to look at the back of her head for any bruising as she has tight kim.    Dr. Duncan paged.  IZA Lazo RN/Dr. Duncan @ [phone]  Send to ER. She is at high risk for head bleed.  On blood thinners (coumadin).    911/[id number] called @ 1028    Port accessed here.  Per Dr. Duncna no narcotics while here in infusion as to not potentially cause any mental status alterations.    ER called @ 1041 with report.  911 here @ 1041    Patient left w/911 via stretcher @ 6047

## 2022-02-04 NOTE — CONFIDENTIAL NOTE
Pt called in to triage requesting IVF pain meds for lower back pain rated 9/10 x since last night. Stated last took prn MS contin and Oxycodone at 6 am this morning without relief. Denied any fevers, chills, cough, sob, chest pain, numbness or tingling or other symptoms not typical of sickle cell pain.   Pt's last infusion was 2/3/22, last clinic visit 1/21/22 Andrei HIGGINS with follow-up on 2/8/22 with Andrei HIGGINS.   Patient states they do not have own ride and it will take 60 minutes long to get to cancer clinic.   Pt denied being out of home medications and needing any refills today.   Pt qualifies for sickle cell standing order protocol.  If you do not hear from the infusion center by 2pm then you will not be able to get in for an infusion today.   Please note, if you are late for your appt, you risk losing your infusion appt as it may delay another patient's infusion who arrived on time.   Has an appt at 10:30 wondering if could get in after 11:30 for infusion.   9:23 Jennifer calls in stating that she had to reschedule her home appointment so anytime for an infusion would work for her.   10:30 IVF/pain with [facility] is available.    Call placed to Jennifer and she confirms she can make it to that appointment.   Message sent to social work to assist in setting up ride.   Message sent to  to setup appointment.

## 2022-02-04 NOTE — PROGRESS NOTES
Social Work Note: Telephone Call  Oncology Clinic     Data/Intervention:  Patient Name:  Jennifer Cervantes  /Age: 2000, 21 years old     Call From: [facility] Triage        Reason for Call:  Transportation     Assessment:   called [employer] ([phone]) to arrange ride. [employer] arranged  for patient from home with a will call return ride.  Patient will need to call when ready for return ride home ([phone]).      Plan:  Patient is aware of the transportation plan.  available to assist with any other needs.      CARLOS Chavez,[facility]  Hematology/Oncology Social Worker  Phone:[phone] Pager: [phone]

## 2022-02-04 NOTE — DISCHARGE INSTRUCTIONS
Your head CT showed no evidence of bleed. Because you are anticoagulated you are still at risk for delayed head bleeds. If you develop significant headache, vomiting, any neurologic changes or confusion please return to the ER immediately as that could be the sign of delayed head bleed. Please make sure that you are with your mom or another adult for the remainder of the day/tomorrow, someone who can monitor your mental status and call 911 if you develop any signs or symptoms of head bleed.

## 2022-02-04 NOTE — ED TRIAGE NOTES
Patient presents via EMS from clinic for c/o fall. Patient reports slipping on driveway earlier today, landed on head. Patient denies loss of consciousness and neck pain. Patient reports head and back pain; patient was headed to clinic for treatment of back pain when she slipped. Patient is on warfarin for hx of PEs.

## 2022-02-06 ENCOUNTER — HOME INFUSION (PRE-WILLOW HOME INFUSION) (OUTPATIENT)
Dept: PHARMACY | Facility: CLINIC | Age: 22
End: 2022-02-06
Payer: COMMERCIAL

## 2022-02-07 ENCOUNTER — PATIENT OUTREACH (OUTPATIENT)
Dept: CARE COORDINATION | Facility: CLINIC | Age: 22
End: 2022-02-07
Payer: COMMERCIAL

## 2022-02-07 ENCOUNTER — TELEPHONE (OUTPATIENT)
Dept: ONCOLOGY | Facility: CLINIC | Age: 22
End: 2022-02-07
Payer: COMMERCIAL

## 2022-02-07 ENCOUNTER — INFUSION THERAPY VISIT (OUTPATIENT)
Dept: INFUSION THERAPY | Facility: CLINIC | Age: 22
End: 2022-02-07
Attending: PEDIATRICS
Payer: COMMERCIAL

## 2022-02-07 VITALS
RESPIRATION RATE: 18 BRPM | HEART RATE: 107 BPM | DIASTOLIC BLOOD PRESSURE: 73 MMHG | OXYGEN SATURATION: 94 % | TEMPERATURE: 98.1 F | SYSTOLIC BLOOD PRESSURE: 129 MMHG

## 2022-02-07 DIAGNOSIS — G81.10 SPASTIC HEMIPLEGIA, UNSPECIFIED ETIOLOGY, UNSPECIFIED LATERALITY (H): Primary | ICD-10-CM

## 2022-02-07 DIAGNOSIS — Z11.59 ENCOUNTER FOR SCREENING FOR OTHER VIRAL DISEASES: Primary | ICD-10-CM

## 2022-02-07 DIAGNOSIS — D57.00 SICKLE CELL PAIN CRISIS (H): ICD-10-CM

## 2022-02-07 PROCEDURE — 250N000011 HC RX IP 250 OP 636: Performed by: PEDIATRICS

## 2022-02-07 PROCEDURE — 96374 THER/PROPH/DIAG INJ IV PUSH: CPT

## 2022-02-07 PROCEDURE — 96361 HYDRATE IV INFUSION ADD-ON: CPT

## 2022-02-07 PROCEDURE — 258N000003 HC RX IP 258 OP 636: Performed by: PEDIATRICS

## 2022-02-07 PROCEDURE — 96376 TX/PRO/DX INJ SAME DRUG ADON: CPT

## 2022-02-07 RX ORDER — HEPARIN SODIUM (PORCINE) LOCK FLUSH IV SOLN 100 UNIT/ML 100 UNIT/ML
5 SOLUTION INTRAVENOUS
Status: DISCONTINUED | OUTPATIENT
Start: 2022-02-07 | End: 2022-02-07 | Stop reason: HOSPADM

## 2022-02-07 RX ORDER — DIPHENHYDRAMINE HCL 25 MG
25 CAPSULE ORAL
Status: CANCELLED
Start: 2022-04-01

## 2022-02-07 RX ORDER — DIPHENHYDRAMINE HCL 25 MG
25 CAPSULE ORAL
Status: DISCONTINUED | OUTPATIENT
Start: 2022-02-07 | End: 2022-02-07 | Stop reason: HOSPADM

## 2022-02-07 RX ORDER — MORPHINE SULFATE 2 MG/ML
2 INJECTION, SOLUTION INTRAMUSCULAR; INTRAVENOUS
Status: CANCELLED
Start: 2022-04-01

## 2022-02-07 RX ORDER — ONDANSETRON 8 MG/1
8 TABLET, FILM COATED ORAL
Status: CANCELLED
Start: 2022-04-01

## 2022-02-07 RX ORDER — ONDANSETRON 8 MG/1
8 TABLET, ORALLY DISINTEGRATING ORAL
Status: DISCONTINUED | OUTPATIENT
Start: 2022-02-07 | End: 2022-02-07 | Stop reason: HOSPADM

## 2022-02-07 RX ORDER — HEPARIN SODIUM,PORCINE 10 UNIT/ML
5 VIAL (ML) INTRAVENOUS
Status: CANCELLED | OUTPATIENT
Start: 2022-04-01

## 2022-02-07 RX ORDER — MORPHINE SULFATE 2 MG/ML
2 INJECTION, SOLUTION INTRAMUSCULAR; INTRAVENOUS
Status: DISCONTINUED | OUTPATIENT
Start: 2022-02-07 | End: 2022-02-07 | Stop reason: HOSPADM

## 2022-02-07 RX ORDER — HEPARIN SODIUM (PORCINE) LOCK FLUSH IV SOLN 100 UNIT/ML 100 UNIT/ML
5 SOLUTION INTRAVENOUS
Status: CANCELLED | OUTPATIENT
Start: 2022-04-01

## 2022-02-07 RX ADMIN — DEXTROSE AND SODIUM CHLORIDE 1000 ML: 5; 450 INJECTION, SOLUTION INTRAVENOUS at 13:19

## 2022-02-07 RX ADMIN — MORPHINE SULFATE 2 MG: 2 INJECTION, SOLUTION INTRAMUSCULAR; INTRAVENOUS at 14:24

## 2022-02-07 RX ADMIN — Medication 5 ML: at 15:24

## 2022-02-07 RX ADMIN — MORPHINE SULFATE 2 MG: 2 INJECTION, SOLUTION INTRAMUSCULAR; INTRAVENOUS at 15:20

## 2022-02-07 RX ADMIN — MORPHINE SULFATE 2 MG: 2 INJECTION, SOLUTION INTRAMUSCULAR; INTRAVENOUS at 13:21

## 2022-02-07 NOTE — LETTER
2/7/2022         RE: Jennifer Cervantes  [address]  [place] [address]        Dear Colleague,    Thank you for referring your patient, Jennifer Cervantes, to the [facility] TREATMENT [facility]. Please see a copy of my visit note below.    Nursing Note  Jennifer Cervantes [facility] for:   Chief Complaint   Patient presents with     Infusion     IVF, pain meds     During today's [facility] appointment, orders from Dr. Duncan were completed.  Frequency: as needed    Progress note:  Patient identification verified by name and date of birth.  Assessment completed.  Vitals recorded in Doc Flowsheets.  Patient was provided with education regarding medication/procedure and possible side effects.  Patient verbalized understanding.     present during visit today: Not Applicable.    Treatment Conditions: Non-applicable.    Premedications: were not needed per patient.    Drug Waste Record: No    Infusion length and rate:  infusion given over approximately 2 hours    Labs: were not ordered for this appointment.    Vascular access: port accessed today.    Is the next appt scheduled? NA, prn  Asymptomatic COVID test completed? no    Post Infusion Assessment:  Patient tolerated infusion without incident.     Discharge Plan:   Follow up plan of care with: ordering provider as scheduled.  Discharge instructions were reviewed with patient.  Patient/representative verbalized understanding of discharge instructions and all questions answered.  Patient discharged from [facility] in stable condition.    Yina Ford RN       Administrations This Visit     dextrose 5% and 0.45% NaCl BOLUS     Admin Date  02/07/2022 Action  New Bag Dose  1,000 mL Rate  500 mL/hr Route  Intravenous Administered By  Yina Ford RN          heparin 100 UNIT/ML injection 5 mL     Admin Date  02/07/2022 Action  Given  Dose  5 mL Route  Intracatheter Administered By  Ynia Ford, RN          morphine (PF) injection 2 mg     Admin Date  02/07/2022 Action  Given Dose  2 mg Route  Intravenous Administered By  Yina Ford RN           Admin Date  02/07/2022 Action  Given Dose  2 mg Route  Intravenous Administered By  Yina Ford RN           Admin Date  02/07/2022 Action  Given Dose  2 mg Route  Intravenous Administered By  Yina Ford, JOE                /80 (BP Location: Left arm, Patient Position: Semi-Short's)   Pulse 78   Temp 98.1  F (36.7  C) (Oral)   Resp 18   SpO2 94%           Again, thank you for allowing me to participate in the care of your patient.        Sincerely,        [facility] [place]

## 2022-02-07 NOTE — PATIENT INSTRUCTIONS
Patient Education     Morphine Injection 2 mg/mL  Uses  This medicine is used for the following purposes:    pain    fluid in lungs    drug dependence  Instructions  Carefully follow the instructions for preparing this medicine before injection.  Read and make sure you understand the instructions for measuring your dose and using the syringe before using this medicine.  Always inspect the medicine before using.  The liquid should be clear or light yellow.  Do not use the medicine if it contains any particles or if it has changed color.  Protect medicine from light.  Speak with your nurse or pharmacist about how long the medicine can be stored safely at room temperature or in the refrigerator before it needs to be discarded.  Never use any medicine that has .  Please ask your doctor, nurse, or pharmacist how to discard unused medicines safely.  Ask your doctor, nurse or pharmacist to show you how to use this medicine correctly.  If you forget to take a dose on time, take it as soon as you remember. If it is almost time for the next dose, do not take the missed dose. Return to your normal dosing schedule. Do not take 2 doses of this medicine at one time.  Please tell your doctor and pharmacist about all the medicines you take. Include both prescription and over-the-counter medicines. Also tell them about any vitamins, herbal medicines, or anything else you take for your health.  If your symptoms do not improve or they worsen while on this medicine, contact your doctor.  To reduce constipation, eat high fiber foods, drink plenty of water and exercise.  Do not suddenly stop taking this medicine. Check with your doctor before stopping.  If you need to stop this medicine, your doctor may wish to gradually reduce the dosage before stopping.  It is very important that you follow your doctor's instructions for all blood tests.  Cautions  This medicine contains an opioid. Though it helps many people, this medicine may  sometimes cause addiction, especially if it is used for a long time. This risk for addiction may be higher if you have a substance use disorder - such as overuse of or addiction to drugs or alcohol. Speak with your doctor about the benefits and risks of using this medicine.  Ask your doctor or pharmacist if you should have naloxone on hand to treat opioid overdose. Teach your family or household members about the signs of an opioid overdose and how to treat it.  This medicine can be habit-forming. If you use this medicine regularly for a long time, it can lead to withdrawal symptoms when you stop. Please use this medicine only as directed.  Tell your doctor and pharmacist if you ever had an allergic reaction to a medicine. Symptoms of an allergic reaction can include trouble breathing, skin rash, itching, swelling, or severe dizziness.  Some patients with weak hearts may have worsening of symptoms. If you notice difficulty breathing, weight gain, or swelling of your legs or ankles, let your doctor know right away.  Some patients taking this medicine have experienced serious side effects. Please speak with your doctor to understand the risks and benefits associated with this medicine.  Do not use the medication any more than instructed.  This medicine may cause dizziness or fainting, especially after exercising or in hot weather. Be very careful when standing or sitting up quickly.  Your ability to stay alert or to react quickly may be impaired by this medicine. Do not operate machinery or drive while on this medicine.  Do not drink beverages with alcohol while on this medicine.  If possible, avoid using with marijuana or other medicines that can cause dizziness or drowsiness. These include allergy/cold products, muscle relaxers, sleep aids, and pain relievers.  Call the doctor if there are any signs of confusion or unusual changes in behavior.  Tell the doctor or pharmacist if you are pregnant, planning to be  pregnant, or breastfeeding.  Ask your pharmacist if this medicine can interact with any of your other medicines. Be sure to tell them about all the medicines you take.  Please tell all your doctors and dentists that you are on this medicine before they provide care.  Do not start or stop any other medicines without first speaking to your doctor or pharmacist.  This medicine should be used with caution in patients with breathing difficulties.  Call your doctor right away if you notice slow or shallow breathing.  Used needles and syringes should be thrown away properly in a medical waste container. Ask your doctor or pharmacist if you need help.  Do not share this medicine with anyone who has not been prescribed this medicine.  Side Effects  The following is a list of some common side effects from this medicine. Please speak with your doctor about what you should do if you experience these or other side effects.    constipation    dizziness    drowsiness or sedation    dry mouth    reaction at the area of the injection (pain, redness, swelling)    nausea    sweating    vomiting  Call your doctor or get medical help right away if you notice any of these more serious side effects:    decreased appetite    breathing interruption during sleep    shallow, irregular breathing    changes in memory, mood, or thinking    difficulty concentrating    fainting    hallucinations (unusual thoughts, seeing or hearing things that are not real)    fast or irregular heart beats    slow heartbeat    seizures    shortness of breath    severe stomach or bowel pain    unusual or unexplained tiredness or weakness    difficulty or discomfort urinating    blurring or changes of vision    weight loss  A few people may have an allergic reactions to this medicine. Symptoms can include difficulty breathing, skin rash, itching, swelling, or severe dizziness. If you notice any of these symptoms, seek medical help quickly.  Extra  Please speak with  your doctor, nurse, or pharmacist if you have any questions about this medicine.  [email or web address]  IMPORTANT NOTE: This document tells you briefly how to take your medicine, but it does not tell you all there is to know about it.Your doctor or pharmacist may give you other documents about your medicine. Please talk to them if you have any questions.Always follow their advice. There is a more complete description of this medicine available in English.Scan this code on your smartphone or tablet or use the web address below. You can also ask your pharmacist for a printout. If you have any questions, please ask your pharmacist.     2021 [employer].

## 2022-02-07 NOTE — PROGRESS NOTES
Social Work Note: Telephone Call  Oncology Clinic     Data/Intervention:  Patient Name:  Jennifer Cervantes  /Age: 2000, 21 years old     Call From: [facility]        Reason for Call:  Transportation     Assessment:   called [employer] ([phone]) to arrange ride through patient's insurance. [employer] arranged  for patient from home with will call return ride.  Patient will need to call when ready for return ride home ([phone]).      Plan:  Patient is aware of the transportation plan.  available to assist with any other needs.      CARLOS Chavez,[facility]  Hematology/Oncology Social Worker  Phone:[phone] Pager: [phone]

## 2022-02-07 NOTE — PROGRESS NOTES
Nursing Note  Jennifer Cervantes presents [facility] for:   Chief Complaint   Patient presents with     Infusion     IVF, pain meds     During today's [facility] appointment, orders from Dr. Duncan were completed.  Frequency: as needed    Progress note:  Patient identification verified by name and date of birth.  Assessment completed.  Vitals recorded in Doc Flowsheets.  Patient was provided with education regarding medication/procedure and possible side effects.  Patient verbalized understanding.     present during visit today: Not Applicable.    Treatment Conditions: Non-applicable.    Premedications: were not needed per patient.    Drug Waste Record: No    Infusion length and rate:  infusion given over approximately 2 hours    Labs: were not ordered for this appointment.    Vascular access: port accessed today.    Is the next appt scheduled? NA, prn  Asymptomatic COVID test completed? no    Post Infusion Assessment:  Patient tolerated infusion without incident.     Discharge Plan:   Follow up plan of care with: ordering provider as scheduled.  Discharge instructions were reviewed with patient.  Patient/representative verbalized understanding of discharge instructions and all questions answered.  Patient discharged from [facility] in stable condition.    Yina Ford RN       Administrations This Visit     dextrose 5% and 0.45% NaCl BOLUS     Admin Date  02/07/2022 Action  New Bag Dose  1,000 mL Rate  500 mL/hr Route  Intravenous Administered By  Yina Ford RN          heparin 100 UNIT/ML injection 5 mL     Admin Date  02/07/2022 Action  Given Dose  5 mL Route  Intracatheter Administered By  Yina Ford RN          morphine (PF) injection 2 mg     Admin Date  02/07/2022 Action  Given Dose  2 mg Route  Intravenous Administered By  Yina Ford RN           Admin Date  02/07/2022 Action  Given Dose  2  mg Route  Intravenous Administered By  Yina Ford, RN           Admin Date  02/07/2022 Action  Given Dose  2 mg Route  Intravenous Administered By  Yina Ford, RN                /80 (BP Location: Left arm, Patient Position: Semi-Short's)   Pulse 78   Temp 98.1  F (36.7  C) (Oral)   Resp 18   SpO2 94%

## 2022-02-07 NOTE — TELEPHONE ENCOUNTER
[facility] Telephone Triage Note    The following symptoms were reported:   Typical  Description:            Onset:  Last night  Location: Back  Character: Sharp           Intensity: 8/10    Accompanying Signs & Symptoms:  none tingling, discoloration, swelling of extremities   Chest Pain:  denies     Shortness of Breath:  denies     Fever:  denies     Chills:  denies   Cough/sore throat:  denies  Other:  Needs transportation    Therapies Tried and outcome: MS Contin and Oxycodone last taken around 5am    Improved by: heat and hot bath with minimal improvement    The following provider was consulted:    Meets protocol per Therapy Plan Orders    The following advice/orders were given, and/or interventions recommended:  Pending appt availability  Appt available for 1pm    [address] [facility] notified for transportation assist.     Patient instructions and/or follow up:   This writer called and relayed information to Pt. Asked pt to repeat back info/plan.  Pt was able to reverbalize understanding for 1pm appt.    Scheduling request sent for 1pm IVF/Pain appt.

## 2022-02-07 NOTE — PROGRESS NOTES
This is a recent snapshot of the patient's [facility] medical record.  For current drug dose and complete information and questions, call [phone] or In Basket pool, fv home infusion ([id number])  CSN Number:  [id number]

## 2022-02-08 ENCOUNTER — DOCUMENTATION ONLY (OUTPATIENT)
Dept: INTERVENTIONAL RADIOLOGY/VASCULAR | Facility: CLINIC | Age: 22
End: 2022-02-08
Payer: COMMERCIAL

## 2022-02-08 ENCOUNTER — TELEPHONE (OUTPATIENT)
Dept: ONCOLOGY | Facility: CLINIC | Age: 22
End: 2022-02-08
Payer: COMMERCIAL

## 2022-02-08 ENCOUNTER — HOME INFUSION (PRE-WILLOW HOME INFUSION) (OUTPATIENT)
Dept: PHARMACY | Facility: CLINIC | Age: 22
End: 2022-02-08

## 2022-02-08 NOTE — TELEPHONE ENCOUNTER
Patient missed visit with Andrei TERRY NP.     ----- Message from So Kearns sent at 2/8/2022  8:02 AM CST -----  Regarding: Please reschedule this appt.  2/8/22    Good morning!    Please reschedule the 8 am appt with Andrei TERRY as I wasn't able to reach Jennifer this morning.    Thank you!    So Kearns VF

## 2022-02-08 NOTE — PROGRESS NOTES
"IR Followup Note via inbasket  02/08/22    \"Hi Dr. Duncan    It appears that the patient was scheduled for port check at [facility] but she was admitted to the [facility] at that time so it was deferred.  It also appears that the infusion nurses used the port yesterday for 2 hours and did not see any report of problems.       If there is still problems with port, we do recommend a TPA lock first and if there are still issues, please let us know.\"      Patient noted to have contrast dye allergy and unknown gadolinium reaction, and CO2 study would make port study sub-optimal.      Michelle Johnson PA-C  Physician Assistant  Interventional Radiology   [phone]      "

## 2022-02-09 ENCOUNTER — HOME INFUSION (PRE-WILLOW HOME INFUSION) (OUTPATIENT)
Dept: PHARMACY | Facility: CLINIC | Age: 22
End: 2022-02-09

## 2022-02-10 ENCOUNTER — HOME INFUSION (PRE-WILLOW HOME INFUSION) (OUTPATIENT)
Dept: PHARMACY | Facility: CLINIC | Age: 22
End: 2022-02-10

## 2022-02-10 ENCOUNTER — PATIENT OUTREACH (OUTPATIENT)
Dept: ONCOLOGY | Facility: CLINIC | Age: 22
End: 2022-02-10
Payer: COMMERCIAL

## 2022-02-10 ENCOUNTER — NURSE TRIAGE (OUTPATIENT)
Dept: ONCOLOGY | Facility: CLINIC | Age: 22
End: 2022-02-10
Payer: COMMERCIAL

## 2022-02-10 DIAGNOSIS — D57.1 SICKLE CELL DISEASE WITHOUT CRISIS (H): Primary | ICD-10-CM

## 2022-02-10 NOTE — TELEPHONE ENCOUNTER
Labs ordered. Please add on a lab visit to her 2/17 appointment and let me know by calling her.  She doesn't have MyChart set up yet.  Thank you,    Arely Krueger, RN  Nurse Care Coordinator  [phone]

## 2022-02-10 NOTE — PROGRESS NOTES
Pt called back and stated she was aware of visit on 2/8 but slept through the phone call, then was in too much pain the rest of the day to follow-up with a call. Stated her pcp visit yesterday was with Dr. Bobo at [facility] [place] and she will call back to reschedule apt as she does not want to switch pcp, she has been with Dr. Bobo since before [facility] and likes her. Informed her that was fine, as long as she keeps her relationship with pcp for ongoing care. Pt should sign Authorization form for Care Everywhere to access [facility] [place] records next time she is in the clinic, she verbalized she would. Of note, she did call in for ivf pain meds today and is waiting for an availability.    Pt scheduled with Patricia JOHNS for 2/17 at 8 am, in person, scheduling messaged. Pt stated she cannot get into her mychart so will resend sign up information, in the mean time will have clinic coordinators call her with all appointments.

## 2022-02-10 NOTE — TELEPHONE ENCOUNTER
[facility] Triage    Incoming call:    Pt called in to triage requesting IVF pain meds for lower back pain rated 8/10 x 2 days. Stated last took mscontin and oxy this morning without much relief. Denied any fevers, chills, cough, sob, chest pain or other symptoms. Denies confusion, numbness, paralysis, vomiting, headache, vision issues, difficulty walking and/or talking. Pt's last infusion was 2/7/22, last clinic visit 1/21/22 with Andrei Machado with follow-up on 3/21/22 with Perla. Patient states they do not have own ride and it will take 60 minutesto get to cancer clinic. Pt denied being out of home medications and needing any refills today. Pt qualifies for sickle cell standing order protocol.    Placed on list as her only infusion this week is 2/7222    Routing to Dr. Duncan and Amanda Roth

## 2022-02-10 NOTE — PROGRESS NOTES
Pt missed virtual visit with Andrei HIGGINS on 2/8, was called multiple times that day and again on 2/9 by Andrei, no answer. Writer received vm from  insurance care coordinator that pt also missed pcp visit yesterday and wanted writer to follow-up with pt to reschedule pcp visit. This visit is not in pt's chart so unsure where it was scheduled. Per Dr. Duncan, if pt ok with staying within the frestyl system, would recommend pcp care at [facility] in [facility]. Also reschedule follow-up with Patricia JOHNS at [facility] instead of Andrei since she will only be at [facility] location going forward.    Called pt and reached brennon baptiste to call back so can coordinate RTN visit with Patricia and pcp.

## 2022-02-11 ENCOUNTER — TELEPHONE (OUTPATIENT)
Dept: ONCOLOGY | Facility: CLINIC | Age: 22
End: 2022-02-11
Payer: COMMERCIAL

## 2022-02-11 ENCOUNTER — HOME INFUSION (PRE-WILLOW HOME INFUSION) (OUTPATIENT)
Dept: PHARMACY | Facility: CLINIC | Age: 22
End: 2022-02-11

## 2022-02-11 DIAGNOSIS — D57.00 SICKLE CELL PAIN CRISIS (H): ICD-10-CM

## 2022-02-11 RX ORDER — OXYCODONE HYDROCHLORIDE 15 MG/1
15 TABLET ORAL EVERY 4 HOURS PRN
Qty: 45 TABLET | Refills: 0 | Status: SHIPPED | OUTPATIENT
Start: 2022-02-11 | End: 2022-02-22

## 2022-02-11 NOTE — TELEPHONE ENCOUNTER
Refill Request    Date of most recent appointment:  1/21/22  Next upcoming appointment:   2/17/22  Prescribing provider(s):  Dr. Duncan  Person requesting refill:  Cedric    Medication requested:  Oxycodone  Quantity:  45  Last fill date:  2/3/22    Notes:  Will be out of meds on 2/14  Pain being treated sickle cell pain  Number of pills remaining: 10  When will pt be out of meds: 2/14  Side effects: denies  Number of times pt takes per day: 4/day  Other Wants med sent to pharmacy so she can  on 2/14.    Not  reviewed.

## 2022-02-11 NOTE — TELEPHONE ENCOUNTER
Pt called again to check on availability, informed her it does not look like there has been any cancellations yet today in infusion. She stated she last took Oxy IR at 7 am so is due for another dose now, though had been trying to wait 6 hours between doses. Advised she can take every 4 hours as needed, along with other pain meds at home. She will continue to wait and monitor symptoms at home, did confirm if she does go to ER she likes to go in the evening time when the waiting time is decreased, she is expecting home infusion to come give her desferal today also. Confirmed follow-up on 2/17 with Patricia SANTIAGO

## 2022-02-11 NOTE — TELEPHONE ENCOUNTER
Jennifer called about appt availability status.    This writer updated Jennifer as of 0836 still waiting for any cancellations for appts to open up.     Triage will call Jennifer if something does open up.    If cannot wait for appt in clinic, pt to go to ED if pain uncontrolled.     Jennifer states will wait a couple more hours to see if appts available later today.

## 2022-02-11 NOTE — TELEPHONE ENCOUNTER
Pt called in to triage requesting IVF pain meds for pain all over: lower back, bliateral arms, legs rated 9/10 x  days. Stated last took prn ms contin and oxy at 6 a.m. this morning without relief. Denied any fevers, chills, cough, sob, chest pain, numbness or tingling or other symptoms not typical of sickle cell pain.   Pt's last infusion was 2/7/22, last clinic visit 1/21/22 with follow-up on 2/17/22 with BERNADETTE Sr.     Patient states she does not have own ride and it will take 25 min long to get to cancer clinic.     Pt requesting oxycodone refill today. Will be out of oxy on 2/14. Pended up on refill encounter.    Pt qualifies for sickle cell standing order protocol.    Advised to seek emergency care if sx worsen and/or pt experiences fever, chills, sob, cough, chest pain, dizziness, numbness or tingling or swelling of extremities while waiting for call back. If does not hear from the infusion center by 2pm then will not be able to get in for an infusion today.     Home nurse coming at 1 p.m. to access pt. ([phone]) Would like apt scheduled around that apt but will cancel it if no other clinic option.     Added to infusion wait list.

## 2022-02-11 NOTE — TELEPHONE ENCOUNTER
Given her frequent use of ER and infusion clinic, it's probably best she keeps her appointment with Patricia and have her labs drawn.  Please ask her to reschedule her PT appointment.    Thank you,    Arely Krueger RN  Nurse Care Coordinator  [phone]

## 2022-02-13 ENCOUNTER — HOSPITAL ENCOUNTER (EMERGENCY)
Facility: CLINIC | Age: 22
Discharge: HOME | End: 2022-02-13
Payer: COMMERCIAL

## 2022-02-13 VITALS
HEART RATE: 118 BPM | TEMPERATURE: 97.9 F | BODY MASS INDEX: 29.18 KG/M2 | SYSTOLIC BLOOD PRESSURE: 139 MMHG | OXYGEN SATURATION: 94 % | WEIGHT: 170 LBS | RESPIRATION RATE: 16 BRPM | DIASTOLIC BLOOD PRESSURE: 80 MMHG

## 2022-02-14 ENCOUNTER — TELEPHONE (OUTPATIENT)
Dept: ONCOLOGY | Facility: CLINIC | Age: 22
End: 2022-02-14
Payer: COMMERCIAL

## 2022-02-14 ENCOUNTER — PATIENT OUTREACH (OUTPATIENT)
Dept: CARE COORDINATION | Facility: CLINIC | Age: 22
End: 2022-02-14
Payer: COMMERCIAL

## 2022-02-14 ENCOUNTER — DOCUMENTATION ONLY (OUTPATIENT)
Dept: ANTICOAGULATION | Facility: CLINIC | Age: 22
End: 2022-02-14
Payer: COMMERCIAL

## 2022-02-14 ENCOUNTER — INFUSION THERAPY VISIT (OUTPATIENT)
Dept: INFUSION THERAPY | Facility: CLINIC | Age: 22
End: 2022-02-14
Attending: PEDIATRICS
Payer: COMMERCIAL

## 2022-02-14 VITALS
DIASTOLIC BLOOD PRESSURE: 79 MMHG | SYSTOLIC BLOOD PRESSURE: 133 MMHG | OXYGEN SATURATION: 92 % | RESPIRATION RATE: 16 BRPM | TEMPERATURE: 98.5 F | HEART RATE: 109 BPM

## 2022-02-14 DIAGNOSIS — D57.00 SICKLE CELL PAIN CRISIS (H): ICD-10-CM

## 2022-02-14 DIAGNOSIS — G81.10 SPASTIC HEMIPLEGIA, UNSPECIFIED ETIOLOGY, UNSPECIFIED LATERALITY (H): Primary | ICD-10-CM

## 2022-02-14 PROCEDURE — 96376 TX/PRO/DX INJ SAME DRUG ADON: CPT

## 2022-02-14 PROCEDURE — 258N000003 HC RX IP 258 OP 636: Performed by: PEDIATRICS

## 2022-02-14 PROCEDURE — 96374 THER/PROPH/DIAG INJ IV PUSH: CPT

## 2022-02-14 PROCEDURE — 250N000011 HC RX IP 250 OP 636: Performed by: PEDIATRICS

## 2022-02-14 RX ORDER — HEPARIN SODIUM (PORCINE) LOCK FLUSH IV SOLN 100 UNIT/ML 100 UNIT/ML
5 SOLUTION INTRAVENOUS
Status: DISCONTINUED | OUTPATIENT
Start: 2022-02-14 | End: 2022-02-14 | Stop reason: HOSPADM

## 2022-02-14 RX ORDER — DIPHENHYDRAMINE HCL 25 MG
25 CAPSULE ORAL
Status: CANCELLED
Start: 2022-04-01

## 2022-02-14 RX ORDER — ONDANSETRON 8 MG/1
8 TABLET, FILM COATED ORAL
Status: CANCELLED
Start: 2022-04-01

## 2022-02-14 RX ORDER — MORPHINE SULFATE 2 MG/ML
2 INJECTION, SOLUTION INTRAMUSCULAR; INTRAVENOUS
Status: CANCELLED
Start: 2022-04-01

## 2022-02-14 RX ORDER — HEPARIN SODIUM (PORCINE) LOCK FLUSH IV SOLN 100 UNIT/ML 100 UNIT/ML
5 SOLUTION INTRAVENOUS
Status: CANCELLED | OUTPATIENT
Start: 2022-04-01

## 2022-02-14 RX ORDER — HEPARIN SODIUM,PORCINE 10 UNIT/ML
5 VIAL (ML) INTRAVENOUS
Status: CANCELLED | OUTPATIENT
Start: 2022-04-01

## 2022-02-14 RX ORDER — MORPHINE SULFATE 2 MG/ML
2 INJECTION, SOLUTION INTRAMUSCULAR; INTRAVENOUS
Status: DISCONTINUED | OUTPATIENT
Start: 2022-02-14 | End: 2022-02-14 | Stop reason: HOSPADM

## 2022-02-14 RX ADMIN — DEXTROSE AND SODIUM CHLORIDE 1000 ML: 5; 450 INJECTION, SOLUTION INTRAVENOUS at 15:18

## 2022-02-14 RX ADMIN — Medication 5 ML: at 17:23

## 2022-02-14 RX ADMIN — MORPHINE SULFATE 2 MG: 2 INJECTION, SOLUTION INTRAMUSCULAR; INTRAVENOUS at 16:19

## 2022-02-14 RX ADMIN — MORPHINE SULFATE 2 MG: 2 INJECTION, SOLUTION INTRAMUSCULAR; INTRAVENOUS at 15:21

## 2022-02-14 RX ADMIN — MORPHINE SULFATE 2 MG: 2 INJECTION, SOLUTION INTRAMUSCULAR; INTRAVENOUS at 17:18

## 2022-02-14 NOTE — TELEPHONE ENCOUNTER
Pt called in to triage requesting IVF pain meds for pain everywhere rated 10/10 x 3 days. Stated last took prn oxycodone, ms contin this morning without relief. Denied any fevers, chills, cough, sob, chest pain, numbness or tingling or other symptoms not typical of sickle cell pain.   Pt's last infusion was 2/7/22, last clinic visit 1/21/22 with follow-up on 2/17/22 with BERNADETTE Sr.     Patient states does not have own ride and it will take 25 minutes long to get to cancer clinic.     Pt needing refills today. Needs oxy. Oxy filled on 2/11/22 at [address]     Pt qualifies for sickle cell standing order protocol.    If you do not hear from the infusion center by 2pm then you will not be able to get in for an infusion today. If sx worsen, and/or you experience fever, chills, sob, chest pain, n/v, cough, dizziness or other sx, please be evaluated in ED.     Added to wait list.     [facility] has apt at 1500  Offered to Jennifer who confirmed she can come.  Contacted SW to arrange a ride.  IB sent to scheduling.  Updated [facility] charge that pt confirmed she can come.     Routed to Providers and Care Team.

## 2022-02-14 NOTE — PATIENT INSTRUCTIONS
Dear Jennifer Cervantes    Thank you for choosing [facility] ([facility]) for your infusion.  The following information is a summary of our appointment as well as important reminders.      We look forward in seeing you on your next appointment here at [facility] ([facility]).  Please don t hesitate to call us at [phone] to reschedule any of your appointments or to speak with one of the [facility] registered nurses.  It was a pleasure taking care of you today.    Sincerely,    [facility]  [facility]  [address] [place] [address]  [id number]  Phone:  [phone]

## 2022-02-14 NOTE — PROGRESS NOTES
Nursing Note  Jennifer Cervantes presents today to [facility] for:   Chief Complaint   Patient presents with     Infusion     IV fluids and pain medications     During today's [facility] appointment, orders from Dr Duncan were completed.  Frequency: as needed    Progress note:  Patient identification verified by name and date of birth.  Assessment completed.  Vitals recorded in Doc Flowsheets.  Patient was provided with education regarding medication/procedure and possible side effects.  Patient verbalized understanding.    Treatment Conditions: Non-applicable.    Premedications: were not ordered.    Infusion length and rate:  infusion given over approximately 2 hours    Labs: were not ordered for this appointment.    Vascular access: port accessed today.    Is the next appt scheduled? Not applicable  Asymptomatic COVID test completed? no    Report given to JOE Gomez at 1628. Care was transferred at this time.     Discharge Plan:   Follow up plan of care with: ongoing infusions at [facility]., ordering provider as scheduled. and after visit summary declined by patient  Discharge instructions were reviewed with patient.  Patient/representative verbalized understanding of discharge instructions and all questions answered.  Patient discharged from [facility] in stable condition.    Cristela Henry RN    Administrations This Visit     dextrose 5% and 0.45% NaCl BOLUS     Admin Date  02/14/2022 Action  New Bag Dose  1,000 mL Rate  500 mL/hr Route  Intravenous Administered By  Cristela Herny RN          morphine (PF) injection 2 mg     Admin Date  02/14/2022 Action  Given Dose  2 mg Route  Intravenous Administered By  Cristela Henry RN           Admin Date  02/14/2022 Action  Given Dose  2 mg Route  Intravenous Administered By  Cristela Henry  RN                /79 (BP Location: Left arm)   Pulse 109   Temp 98.5  F (36.9  C) (Oral)   Resp 16   SpO2 92%

## 2022-02-14 NOTE — LETTER
2/14/2022         RE: Jennifer Cervantes  [address]  [facility] [address]        Dear Colleague,    Thank you for referring your patient, Jennifer Cervantes, to the [facility]. Please see a copy of my visit note below.    Nursing Note  Jennifer Cervantes [facility] for:   Chief Complaint   Patient presents with     Infusion     IV fluids and pain medications     During today's [facility] appointment, orders from Dr Duncan were completed.  Frequency: as needed    Progress note:  Patient identification verified by name and date of birth.  Assessment completed.  Vitals recorded in Doc Flowsheets.  Patient was provided with education regarding medication/procedure and possible side effects.  Patient verbalized understanding.    Treatment Conditions: Non-applicable.    Premedications: were not ordered.    Infusion length and rate:  infusion given over approximately 2 hours    Labs: were not ordered for this appointment.    Vascular access: port accessed today.    Is the next appt scheduled? Not applicable  Asymptomatic COVID test completed? no    Report given to JOE Gomez at 1628. Care was transferred at this time.     Discharge Plan:   Follow up plan of care with: ongoing infusions at  [facility] ordering provider as scheduled. and after visit summary declined by patient  Discharge instructions were reviewed with patient.  Patient/representative verbalized understanding of discharge instructions and all questions answered.  Patient discharged from  [facility] in stable condition.    Cristela Henry RN    Administrations This Visit     dextrose 5% and 0.45% NaCl BOLUS     Admin Date  02/14/2022 Action  New Bag Dose  1,000 mL Rate  500 mL/hr Route  Intravenous Administered By  Cristela Henry RN          morphine  (PF) injection 2 mg     Admin Date  02/14/2022 Action  Given Dose  2 mg Route  Intravenous Administered By  Cristela Henry RN           Admin Date  02/14/2022 Action  Given Dose  2 mg Route  Intravenous Administered By  Cristela Henry RN                /79 (BP Location: Left arm)   Pulse 109   Temp 98.5  F (36.9  C) (Oral)   Resp 16   SpO2 92%         Again, thank you for allowing me to participate in the care of your patient.        Sincerely,        [facility]

## 2022-02-14 NOTE — PROGRESS NOTES
Social Work Note: Telephone Call  Oncology Clinic     Data/Intervention:  Patient Name:  Jennifer Cervantes DOB/Age: 2000     Call From: [facility]        Reason for Call:  Transportation      Assessment:   called [employer] to arrange ride through patient's insurance. [place] Ride arranged  for patient from home with [employer] ([phone]).  Patient will need to call when ready for return ride home ([phone]).      Plan:  Patient is aware of the transportation plan.  available to assist with any other needs.      CARLOS Chavez,UDAY  Hematology/Oncology Social Worker  Phone:[phone] Pager: [phone]

## 2022-02-14 NOTE — PROGRESS NOTES
ANTICOAGULATION  MANAGEMENT: Discharge Review    Jennifer Cervantes chart reviewed for anticoagulation continuity of care    Emergency room visit on 2/13/22 for back pain, leg pain, sickle cell pain.    Discharge disposition: Home    Results:    No results for input(s): INR, [id number], F2, ALMWH, AAUFH in the last 168 hours.  Anticoagulation inpatient management:     home regimen continued    Anticoagulation discharge instructions:     Warfarin dosing: home regimen continued   Bridging: No   INR goal change: No      Medication changes affecting anticoagulation: No    Additional factors affecting anticoagulation: Yes: Pain    Plan     No adjustment to anticoagulation plan needed    Patient not contacted    No adjustment to Anticoagulation Calendar was required    Prince Collins, RN

## 2022-02-16 NOTE — PROGRESS NOTES
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     She was admitted 2/1/21-2/3/21 with a new PE, started on Rivaroxaban. Switched to Eliquis 3/25/21 with RUE DVT.     She was admitted 4/26/21-5/11/21 with sickle cell pain crisis complicated by worsening PE in setting of low Apixaban levels, acute hypoxic respiratory failure, pneumonia, acute chest syndrome s/p exchange transfusion on 4/30 and 5/4. After 2nd exchange her oxygen requirement dramatically improved from 20L to 1-2L 5/5 and she was off oxygen as of 5/6.      She was admitted 7/13/21-7/25/21 for acute on chronic PE, switched to dabigitran + ASA.      Due to worsening hypoxia she underwent VQ scan 11/10/21 which showed acute on chronic PE for which she was admitted 11/10-11/21. During admission was switched to warfarin. Echo WNL and no signs of pulm HTN on right heart cath. She did receive 1 unit pRBC for hgb 6.4 during admission.    ED visits had decreased although she was utilizing infusion center most days of the week for pain management. Infusion center visits were limited to two times per week starting ~1/24/22.    She was admitted 1/29-1/31/22 for sickle cell pain crisis. She remained subtherapeutic on warfarin alone and was bridged with enoxaparin. Desferal was resumed during this admission and has been continued through home infusion.      She was seen today for routine hematology follow-up.     Interval History:  Jennifer is seen today in the infusion center as her cab was late  for our AM appointment. She's in great spirits. She speaks extensively at how pleased she is with her current pain regimen since being limited to infusion center visits twice per week. She did present to the ED this past Sunday but left before being seen because she anticipated a long wait time and had desired to manage her pain at home. She continues to use Tylenol and heat prior to oxycodone at home. Today her pain is primarily in her lower back which is a typical site for sickle cell pain for her. She has resume Desferal and is receiving through home infusion since her last hospital discharge. She is interested in receiving more than a week of oxycodone at one time and feels that her recent pain control efforts warrant this. She reports compliance in taking hydrea 3000 mg daily. Denies recent respiratory issues. Uses Symbicort daily as directed. Has not required rescue inhalers for many months.    Current Outpatient Medications   Medication Sig Dispense Refill     acetaminophen (TYLENOL) 325 MG tablet Take 2 tablets (650 mg) by mouth every 6 hours as needed for mild pain 120 tablet 3     albuterol (PROAIR HFA/PROVENTIL HFA/VENTOLIN HFA) 108 (90 Base) MCG/ACT inhaler Inhale 2 puffs into the lungs every 6 hours as needed for shortness of breath / dyspnea or wheezing 8.5 g 3     albuterol (PROVENTIL) (2.5 MG/3ML) 0.083% neb solution Take 1 vial (2.5 mg) by nebulization every 6 hours as needed for shortness of breath / dyspnea or wheezing 12 mL 4     budesonide-formoterol (SYMBICORT) 160-4.5 MCG/ACT Inhaler Inhale 2 puffs into the lungs 2 times daily 10.2 g 3     deferasirox (JADENU) 360 MG tablet Take 4 tablets (1,440 mg) by mouth every evening 120 tablet 4     diphenhydrAMINE (BENADRYL) 25 MG capsule Take 1-2 capsules (25-50 mg) by mouth nightly as needed for sleep 60 capsule 3     enoxaparin ANTICOAGULANT (LOVENOX) 80 MG/0.8ML syringe Inject 0.8 mLs (80 mg) Subcutaneous every 12 hours 16 mL 0     EPINEPHrine (ANY  BX GENERIC EQUIV) 0.3 MG/0.3ML injection 2-pack Inject 0.3 mLs (0.3 mg) into the muscle as needed for anaphylaxis 1 each 1     Hydroxyurea 1000 MG TABS Take 3,000 mg by mouth daily 90 tablet 3     lidocaine-prilocaine (EMLA) 2.5-2.5 % external cream Apply topically once for 1 dose Use for port site as needed (Patient not taking: Reported on 11/10/2021) 30 g 1     medroxyPROGESTERone (DEPO-PROVERA) 150 MG/ML IM injection Inject 150 mg into the muscle       morphine (MS CONTIN) 15 MG CR tablet Take 1 tablet (15 mg) by mouth every 12 hours 60 tablet 0     naloxone (NARCAN) 4 MG/0.1ML nasal spray Spray 1 spray (4 mg) into one nostril alternating nostrils once as needed for opioid reversal every 2-3 minutes until assistance arrives 0.2 mL 1     ondansetron (ZOFRAN) 8 MG tablet Take 1 tablet (8 mg) by mouth every 8 hours as needed 30 tablet 1     oxyCODONE IR (ROXICODONE) 15 MG tablet Take 1 tablet (15 mg) by mouth every 4 hours as needed for severe pain Goal 4 per day. Max 6 per day. 45 tablet 0     warfarin ANTICOAGULANT (COUMADIN) 10 MG tablet Take 3 tablets (30 mg) by mouth daily Or as directed by Anticoagulation Clinic. 90 tablet 5     warfarin ANTICOAGULANT (COUMADIN) 5 MG tablet Take 6 tablets daily or as directed by the Coumadin clinic. 190 tablet 3       Past Medical History  Past Medical History:   Diagnosis Date     Anxiety      Bleeding disorder (H)      Blood clotting disorder (H)      Cerebral infarction (H) 2015     Cognitive developmental delay     low IQ. Please recognize when managing pain and planning with her     Depressive disorder      Hemiplegia and hemiparesis following cerebral infarction affecting right dominant side (H)     right hand contractures     Iron overload due to repeated red blood cell transfusions      Migraines      Multiple subsegmental pulmonary emboli without acute cor pulmonale (H) 02/01/2021     Oppositional defiant behavior      Superficial venous thrombosis of arm, right  03/25/2021     Uncomplicated asthma      Past Surgical History:   Procedure Laterality Date     AS INSERT TUNNELED CV 2 CATH W/O PORT/PUMP       CHOLECYSTECTOMY       CV RIGHT HEART CATH MEASUREMENTS RECORDED N/A 11/18/2021    Procedure: Right Heart Cath;  Surgeon: Jackson Stauffer MD;  Location:  HEART CARDIAC CATH LAB     INSERT PORT VASCULAR ACCESS Left 4/21/2021    Procedure: INSERTION, VASCULAR ACCESS PORT (NOT SURE ON SIDE UNTIL REMOVAL);  Surgeon: Rajan More MD;  Location: [facility] OR     IR CHEST PORT PLACEMENT > 5 YRS OF AGE  4/21/2021     IR CVC NON TUNNEL LINE REMOVAL  5/6/2021     IR CVC NON TUNNEL PLACEMENT  04/07/2020     IR CVC NON TUNNEL PLACEMENT  4/30/2021     IR PORT REMOVAL LEFT  4/21/2021     REMOVE PORT VASCULAR ACCESS Left 4/21/2021    Procedure: REMOVAL, VASCULAR ACCESS PORT LEFT;  Surgeon: Rajan More MD;  Location: [facility] OR     REPAIR TENDON ELBOW Right 10/02/2019    Procedure: Right Elbow Flexor Lengthening, Flexor Pronator Slide Of Wrist and Finger, Thumb Adductor Lengthening;  Surgeon: Anai Franco MD;  Location: [facility] OR     TONSILLECTOMY Bilateral 10/02/2019    Procedure: Bilateral Tonsillectomy;  Surgeon: Farahna Guy MD;  Location: [facility] OR     ZZC BREAST REDUCTION (INCLUDES LIPO) TIER 3 Bilateral 04/23/2019     Allergies   Allergen Reactions     Contrast Dye      Hives and breathing issues     Fish-Derived Products Hives     Seafood Hives     Diagnostic X-Ray Materials      Gadolinium      Social History   Social History     Tobacco Use     Smoking status: Never Smoker     Smokeless tobacco: Never Used   Substance Use Topics     Alcohol use: Not Currently     Alcohol/week: 0.0 standard drinks     Drug use: Never      Past medical history and social history were reviewed.    Physical Examination:  /86 (BP Location: Right arm, Patient Position: Sitting, Cuff Size: Adult Large)   Pulse 106   Temp 98.3  F (36.8  C) (Oral)   Resp 16   Wt 76.4 kg (168 lb  8 oz)   SpO2 92%   BMI 28.92 kg/m    Wt Readings from Last 10 Encounters:   02/17/22 76.4 kg (168 lb 8 oz)   02/13/22 77.1 kg (170 lb)   02/04/22 78.7 kg (173 lb 9.6 oz)   01/31/22 76.9 kg (169 lb 9.6 oz)   01/23/22 74.8 kg (165 lb)   01/17/22 75.2 kg (165 lb 12.8 oz)   01/12/22 77.9 kg (171 lb 12.8 oz)   01/08/22 77.1 kg (170 lb)   01/02/22 77.1 kg (170 lb)   12/27/21 76.5 kg (168 lb 10.4 oz)     General: Well-appearing female in no acute distress.  Eyes: EOMI, PERRL. No scleral icterus.  Respiratory:  Normal respiratory effort.  Neurologic: Cranial nerves II through XII are grossly intact. Contractured right hand 2/2 stroke history  Skin: No rashes, petechiae, or bruising noted on exposed skin.    Laboratory Data:  Results for HIMANSHU AL (MRN [id number]) as of 2/18/2022 16:26   Ref. Range 2/17/2022 11:34   Sodium Latest Ref Range: 133 - 144 mmol/L 140   Potassium Latest Ref Range: 3.4 - 5.3 mmol/L 3.7   Chloride Latest Ref Range: 94 - 109 mmol/L 112 (H)   Carbon Dioxide Latest Ref Range: 20 - 32 mmol/L 18 (L)   Urea Nitrogen Latest Ref Range: 7 - 30 mg/dL 5 (L)   Creatinine Latest Ref Range: 0.52 - 1.04 mg/dL 0.52   GFR Estimate Latest Ref Range: >60 mL/min/1.73m2 >90   Calcium Latest Ref Range: 8.5 - 10.1 mg/dL 8.6   Anion Gap Latest Ref Range: 3 - 14 mmol/L 10   Albumin Latest Ref Range: 3.4 - 5.0 g/dL 4.0   Protein Total Latest Ref Range: 6.8 - 8.8 g/dL 7.9   Bilirubin Total Latest Ref Range: 0.2 - 1.3 mg/dL 3.0 (H)   Alkaline Phosphatase Latest Ref Range: 40 - 150 U/L 89   ALT Latest Ref Range: 0 - 50 U/L 92 (H)   AST Latest Ref Range: 0 - 45 U/L 102 (H)   Glucose Latest Ref Range: 70 - 99 mg/dL 92   WBC Latest Ref Range: 4.0 - 11.0 10e3/uL 12.1 (H)   Hemoglobin Latest Ref Range: 11.7 - 15.7 g/dL 8.1 (L)   Hematocrit Latest Ref Range: 35.0 - 47.0 % 22.5 (L)   Platelet Count Latest Ref Range: 150 - 450 10e3/uL 341   RBC Count Latest Ref Range: 3.80 - 5.20 10e6/uL 2.60 (L)   MCV Latest Ref Range: 78 - 100  fL 87   MCH Latest Ref Range: 26.5 - 33.0 pg 31.2   MCHC Latest Ref Range: 31.5 - 36.5 g/dL 36.0   RDW Latest Ref Range: 10.0 - 15.0 % 29.5 (H)   % Neutrophils Latest Units: % 58   % Lymphocytes Latest Units: % 29   % Monocytes Latest Units: % 6   % Eosinophils Latest Units: % 5   % Basophils Latest Units: % 0   % Metamyelocytes Latest Units: % 2   Absolute Basophils Latest Ref Range: 0.0 - 0.2 10e3/uL 0.0   NRBC/W Latest Ref Range: <=0 % 8 (H)   Absolute Neutrophil Latest Ref Range: 1.6 - 8.3 10e3/uL 7.0   Absolute Lymphocytes Latest Ref Range: 0.8 - 5.3 10e3/uL 3.5   Absolute Monocytes Latest Ref Range: 0.0 - 1.3 10e3/uL 0.7   Absolute Eosinophils Latest Ref Range: 0.0 - 0.7 10e3/uL 0.6   Absolute Metamyelocytes Latest Ref Range: <=0.0 10e3/uL 0.2 (H)   Absolute NRBCs Latest Ref Range: <=0.0 10e3/uL 1.0 (H)   RBC Morphology Unknown Confirmed RBC Indices   Platelet Morphology Latest Ref Range: Automated Count Confirmed. Platelet morphology is normal.  Automated Count Confirmed. Platelet morphology is normal.   Polychromasia Latest Ref Range: None Seen  Marked (A)   Sickle Cells Latest Ref Range: None Seen  Marked (A)   Target Cells Latest Ref Range: None Seen  Moderate (A)   INR Latest Ref Range: 0.85 - 1.15  1.16 (H)     The above labs were reviewed and interpreted by me today.    Assessment and Plan:  1. Sickle Cell HgbSS Disease  2. Chronic Pain  Jennifer verbalizes pride in her management of pain since being limited to twice a week infusion center visits. She is doing her best to stay out of the ED as well. Presented to the ED on 2/13/22 but decided to leave before being seen due to long wait times and was able to manage her sickle cell pain at home. Less time in the infusion center has allowed her to spend more time with her family and feel more balanced in recent weeks.    Today she was open to discuss Suboxone which was previously recommended in setting of opioid tolerance and rather poor pain control with  chronic opioid use. She'd would first like Dr. Duncan's input on receiving extended (2 week) oxycodone prescriptions. She is agreeable to see me along with Dr. Duncan at [facility] for future appointments. She did discuss transitioning to a different hematology team during our visit.    Plan:  -Continue Hydrea to 3000mg daily to help lessen frequency of sickle cell pain  -Continue MS Contin and Oxycodone at home  -Infusion center visits limited to two times per week. Continue diligent home management with current medications, heat, rest, compression, warm baths.   -If unable to manage at home can go to ED but continue to not do IV narcotics in the emergency room. Ketamine previously added to pain plan in ED  -Will discuss oxycodone refills and Suboxone with Dr. Duncan and update Jennifer with recommendations  -Continue Desferal and Jadenu for iron overload.  -Continue warfarin, management with anticoagulation clinic for PE history    45 minutes spent on the date of the encounter doing chart review, review of test results, interpretation of tests, patient visit and documentation     Patricia Mantilla CNP  [facility]  [address] [place] [id number]  [phone]

## 2022-02-17 ENCOUNTER — TELEPHONE (OUTPATIENT)
Dept: ONCOLOGY | Facility: CLINIC | Age: 22
End: 2022-02-17

## 2022-02-17 ENCOUNTER — ANTICOAGULATION THERAPY VISIT (OUTPATIENT)
Dept: ANTICOAGULATION | Facility: CLINIC | Age: 22
End: 2022-02-17

## 2022-02-17 ENCOUNTER — ONCOLOGY VISIT (OUTPATIENT)
Dept: ONCOLOGY | Facility: CLINIC | Age: 22
End: 2022-02-17
Attending: REGISTERED NURSE
Payer: COMMERCIAL

## 2022-02-17 ENCOUNTER — APPOINTMENT (OUTPATIENT)
Dept: LAB | Facility: CLINIC | Age: 22
End: 2022-02-17
Attending: PEDIATRICS
Payer: COMMERCIAL

## 2022-02-17 ENCOUNTER — PATIENT OUTREACH (OUTPATIENT)
Dept: CARE COORDINATION | Facility: CLINIC | Age: 22
End: 2022-02-17

## 2022-02-17 VITALS
HEART RATE: 106 BPM | BODY MASS INDEX: 28.92 KG/M2 | WEIGHT: 168.5 LBS | SYSTOLIC BLOOD PRESSURE: 124 MMHG | OXYGEN SATURATION: 92 % | TEMPERATURE: 98.3 F | DIASTOLIC BLOOD PRESSURE: 86 MMHG | RESPIRATION RATE: 16 BRPM

## 2022-02-17 DIAGNOSIS — Z79.01 LONG TERM CURRENT USE OF ANTICOAGULANT THERAPY: ICD-10-CM

## 2022-02-17 DIAGNOSIS — G81.10 SPASTIC HEMIPLEGIA, UNSPECIFIED ETIOLOGY, UNSPECIFIED LATERALITY (H): Primary | ICD-10-CM

## 2022-02-17 DIAGNOSIS — I27.82 CHRONIC PULMONARY EMBOLISM WITHOUT ACUTE COR PULMONALE, UNSPECIFIED PULMONARY EMBOLISM TYPE (H): Primary | ICD-10-CM

## 2022-02-17 DIAGNOSIS — D57.00 SICKLE CELL PAIN CRISIS (H): ICD-10-CM

## 2022-02-17 DIAGNOSIS — D57.1 SICKLE CELL DISEASE WITHOUT CRISIS (H): Primary | ICD-10-CM

## 2022-02-17 DIAGNOSIS — I27.82 CHRONIC PULMONARY EMBOLISM WITHOUT ACUTE COR PULMONALE, UNSPECIFIED PULMONARY EMBOLISM TYPE (H): ICD-10-CM

## 2022-02-17 DIAGNOSIS — J45.909 ASTHMATIC BRONCHITIS WITHOUT COMPLICATION, UNSPECIFIED ASTHMA SEVERITY, UNSPECIFIED WHETHER PERSISTENT: ICD-10-CM

## 2022-02-17 DIAGNOSIS — E83.111 IRON OVERLOAD DUE TO REPEATED RED BLOOD CELL TRANSFUSIONS: ICD-10-CM

## 2022-02-17 LAB
ALBUMIN SERPL-MCNC: 4 G/DL (ref 3.4–5)
ALP SERPL-CCNC: 89 U/L (ref 40–150)
ALT SERPL W P-5'-P-CCNC: 92 U/L (ref 0–50)
ANION GAP SERPL CALCULATED.3IONS-SCNC: 10 MMOL/L (ref 3–14)
AST SERPL W P-5'-P-CCNC: 102 U/L (ref 0–45)
BASOPHILS # BLD MANUAL: 0 10E3/UL (ref 0–0.2)
BASOPHILS NFR BLD MANUAL: 0 %
BILIRUB SERPL-MCNC: 3 MG/DL (ref 0.2–1.3)
BUN SERPL-MCNC: 5 MG/DL (ref 7–30)
CALCIUM SERPL-MCNC: 8.6 MG/DL (ref 8.5–10.1)
CHLORIDE BLD-SCNC: 112 MMOL/L (ref 94–109)
CO2 SERPL-SCNC: 18 MMOL/L (ref 20–32)
CREAT SERPL-MCNC: 0.52 MG/DL (ref 0.52–1.04)
EOSINOPHIL # BLD MANUAL: 0.6 10E3/UL (ref 0–0.7)
EOSINOPHIL NFR BLD MANUAL: 5 %
ERYTHROCYTE [DISTWIDTH] IN BLOOD BY AUTOMATED COUNT: 29.5 % (ref 10–15)
GFR SERPL CREATININE-BSD FRML MDRD: >90 ML/MIN/1.73M2
GLUCOSE BLD-MCNC: 92 MG/DL (ref 70–99)
HCT VFR BLD AUTO: 22.5 % (ref 35–47)
HGB BLD-MCNC: 8.1 G/DL (ref 11.7–15.7)
INR PPP: 1.16 (ref 0.85–1.15)
LYMPHOCYTES # BLD MANUAL: 3.5 10E3/UL (ref 0.8–5.3)
LYMPHOCYTES NFR BLD MANUAL: 29 %
MCH RBC QN AUTO: 31.2 PG (ref 26.5–33)
MCHC RBC AUTO-ENTMCNC: 36 G/DL (ref 31.5–36.5)
MCV RBC AUTO: 87 FL (ref 78–100)
METAMYELOCYTES # BLD MANUAL: 0.2 10E3/UL
METAMYELOCYTES NFR BLD MANUAL: 2 %
MONOCYTES # BLD MANUAL: 0.7 10E3/UL (ref 0–1.3)
MONOCYTES NFR BLD MANUAL: 6 %
NEUTROPHILS # BLD MANUAL: 7 10E3/UL (ref 1.6–8.3)
NEUTROPHILS NFR BLD MANUAL: 58 %
NRBC # BLD AUTO: 1 10E3/UL
NRBC BLD MANUAL-RTO: 8 %
PLAT MORPH BLD: ABNORMAL
PLATELET # BLD AUTO: 341 10E3/UL (ref 150–450)
POLYCHROMASIA BLD QL SMEAR: ABNORMAL
POTASSIUM BLD-SCNC: 3.7 MMOL/L (ref 3.4–5.3)
PROT SERPL-MCNC: 7.9 G/DL (ref 6.8–8.8)
RBC # BLD AUTO: 2.6 10E6/UL (ref 3.8–5.2)
RBC MORPH BLD: ABNORMAL
SICKLE CELLS BLD QL SMEAR: ABNORMAL
SODIUM SERPL-SCNC: 140 MMOL/L (ref 133–144)
TARGETS BLD QL SMEAR: ABNORMAL
WBC # BLD AUTO: 12.1 10E3/UL (ref 4–11)

## 2022-02-17 PROCEDURE — 82947 ASSAY GLUCOSE BLOOD QUANT: CPT | Performed by: REGISTERED NURSE

## 2022-02-17 PROCEDURE — 258N000003 HC RX IP 258 OP 636: Performed by: PEDIATRICS

## 2022-02-17 PROCEDURE — 36591 DRAW BLOOD OFF VENOUS DEVICE: CPT | Performed by: REGISTERED NURSE

## 2022-02-17 PROCEDURE — 99215 OFFICE O/P EST HI 40 MIN: CPT | Performed by: REGISTERED NURSE

## 2022-02-17 PROCEDURE — 96376 TX/PRO/DX INJ SAME DRUG ADON: CPT

## 2022-02-17 PROCEDURE — 96361 HYDRATE IV INFUSION ADD-ON: CPT

## 2022-02-17 PROCEDURE — 250N000011 HC RX IP 250 OP 636: Performed by: PEDIATRICS

## 2022-02-17 PROCEDURE — 80051 ELECTROLYTE PANEL: CPT | Performed by: REGISTERED NURSE

## 2022-02-17 PROCEDURE — 85610 PROTHROMBIN TIME: CPT | Performed by: REGISTERED NURSE

## 2022-02-17 PROCEDURE — 96375 TX/PRO/DX INJ NEW DRUG ADDON: CPT

## 2022-02-17 PROCEDURE — 250N000011 HC RX IP 250 OP 636: Performed by: REGISTERED NURSE

## 2022-02-17 PROCEDURE — 96360 HYDRATION IV INFUSION INIT: CPT

## 2022-02-17 PROCEDURE — 85027 COMPLETE CBC AUTOMATED: CPT | Performed by: REGISTERED NURSE

## 2022-02-17 PROCEDURE — 96374 THER/PROPH/DIAG INJ IV PUSH: CPT

## 2022-02-17 RX ORDER — WARFARIN SODIUM 10 MG/1
30 TABLET ORAL DAILY
Qty: 90 TABLET | Refills: 5 | Status: SHIPPED | OUTPATIENT
Start: 2022-02-17 | End: 2022-02-24

## 2022-02-17 RX ORDER — HEPARIN SODIUM (PORCINE) LOCK FLUSH IV SOLN 100 UNIT/ML 100 UNIT/ML
5 SOLUTION INTRAVENOUS
Status: COMPLETED | OUTPATIENT
Start: 2022-02-17 | End: 2022-02-17

## 2022-02-17 RX ORDER — MORPHINE SULFATE 2 MG/ML
2 INJECTION, SOLUTION INTRAMUSCULAR; INTRAVENOUS
Status: CANCELLED
Start: 2022-04-01

## 2022-02-17 RX ORDER — ONDANSETRON 8 MG/1
8 TABLET, FILM COATED ORAL
Status: CANCELLED
Start: 2022-04-01

## 2022-02-17 RX ORDER — DIPHENHYDRAMINE HCL 25 MG
25 CAPSULE ORAL
Status: CANCELLED
Start: 2022-04-01

## 2022-02-17 RX ORDER — MORPHINE SULFATE 2 MG/ML
2 INJECTION, SOLUTION INTRAMUSCULAR; INTRAVENOUS
Status: DISCONTINUED | OUTPATIENT
Start: 2022-02-17 | End: 2022-02-17 | Stop reason: HOSPADM

## 2022-02-17 RX ORDER — HEPARIN SODIUM (PORCINE) LOCK FLUSH IV SOLN 100 UNIT/ML 100 UNIT/ML
5 SOLUTION INTRAVENOUS
Status: DISCONTINUED | OUTPATIENT
Start: 2022-02-17 | End: 2022-02-17 | Stop reason: HOSPADM

## 2022-02-17 RX ORDER — HEPARIN SODIUM,PORCINE 10 UNIT/ML
5 VIAL (ML) INTRAVENOUS
Status: CANCELLED | OUTPATIENT
Start: 2022-04-01

## 2022-02-17 RX ORDER — HEPARIN SODIUM (PORCINE) LOCK FLUSH IV SOLN 100 UNIT/ML 100 UNIT/ML
5 SOLUTION INTRAVENOUS
Status: CANCELLED | OUTPATIENT
Start: 2022-04-01

## 2022-02-17 RX ADMIN — Medication 5 ML: at 15:43

## 2022-02-17 RX ADMIN — MORPHINE SULFATE 2 MG: 2 INJECTION, SOLUTION INTRAMUSCULAR; INTRAVENOUS at 15:27

## 2022-02-17 RX ADMIN — MORPHINE SULFATE 2 MG: 2 INJECTION, SOLUTION INTRAMUSCULAR; INTRAVENOUS at 14:27

## 2022-02-17 RX ADMIN — MORPHINE SULFATE 2 MG: 2 INJECTION, SOLUTION INTRAMUSCULAR; INTRAVENOUS at 12:48

## 2022-02-17 RX ADMIN — Medication 5 ML: at 11:28

## 2022-02-17 RX ADMIN — DEXTROSE AND SODIUM CHLORIDE 1000 ML: 5; 450 INJECTION, SOLUTION INTRAVENOUS at 12:46

## 2022-02-17 ASSESSMENT — PAIN SCALES - GENERAL: PAINLEVEL: EXTREME PAIN (9)

## 2022-02-17 NOTE — PROGRESS NOTES
ANTICOAGULATION MANAGEMENT     Jennifer Cervantes 21 year old female is on warfarin with subtherapeutic INR result. (Goal INR 2.0-3.0)    Recent labs: (last 7 days)     02/17/22  1134   INR 1.16*       ASSESSMENT     Source(s): Chart Review and Patient/Caregiver Call       Warfarin doses taken: Jennifer reports she has taken 20 mg (4 tablets of 5 mg strength) since discharge from hospital on 2/1/22.  She denies any missed doses of warfarin.  Reviewed with Jennifer that she has peach colored 5 mg warfarin tablets.  She is not at home right now, but states this is the tablet she has.    Diet: No new diet changes identified    New illness, injury, or hospitalization: Yes: hospitalized 1/29/22 - 2/1/22 for sickle cell pain crisis, pleuritic chest pain and sub therapeutic anticoagulation    Medication/supplement changes: None noted    Signs or symptoms of bleeding or clotting: No    Previous INR: Subtherapeutic    Additional findings: Bridging with Enoxaparin until INR >= 2.0  Enoxaparin 80 mg Q 12 hours.  Jennifer states she ran out of enoxaparin about 3 days ago.  New RX was sent to pharmacy today.  New RX for warfarin 10 mg tablets also sent to pharmacy to make dosing easier for patient.  Reviewed with patient that the 10 mg tablets will be white and this is to keep the different size tablets separate. She knows to take 4.5 tablets (45 mg) today and tomorrow; then take 3 tablets (30 mg daily).  Jennifer is at the clinic now and can  medications today.     PLAN     Recommended plan for no diet, medication or health factor changes affecting INR     Dosing Instructions: Booster dose then continue your current warfarin dose with next INR in 4 days.  Explained importance of getting INR checked on 2/21/22 to Jennifer.  She verbalizes an understanding of this.       Summary  As of 2/17/2022    Full warfarin instructions:  2/17: 45 mg; 2/18: 45 mg; Otherwise 30 mg every day   Next INR check:  2/21/2022             Telephone call with  Jennifer who agrees to plan and repeated back plan correctly    Lab visit scheduled    Education provided: Please call back if any changes to your diet, medications or how you've been taking warfarin, Warfarin tablet strength change; remove and/or discard previous strength from medication supply and Contact [phone] with any changes, questions or concerns.     Plan made with [facility] Pharmacist Halle Buck and [facility] protocol.    Aleida Mccormick RN  Anticoagulation Clinic  2/17/2022    _______________________________________________________________________     Anticoagulation Episode Summary     Current INR goal:  2.0-3.0   TTR:  0.0 % (2.4 mo)   Target end date:  Indefinite   Send INR reminders to:  [facility]    Indications    Chronic pulmonary embolism without acute cor pulmonale  unspecified pulmonary embolism type (H) [I27.82]           Comments:  Infusion center [phone]  [facility] [phone]         Anticoagulation Care Providers     Provider Role Specialty Phone number    Eric Duncan MD Referring Pediatric Hematology-Oncology [phone]

## 2022-02-17 NOTE — TELEPHONE ENCOUNTER
Pt called in to triage requesting IVF pain meds for lowerback pain rated 9/10 x  days. Stated last took prn ms warren and oxy at 6 a.m. this morning without relief. Denied any fevers, chills, cough, sob, chest pain, numbness or tingling or other symptoms not typical of sickle cell pain.   Pt's last infusion was 2/14/22, last clinic visit 1/21/22 with follow-up on today with Patricia Mantilla, BERNADETTE--pt states that her cab did not come andshe will be unable to make apt at 0800. Today, if pt gets in would be second infusion since Monday.  Patient states needs ride.  Pt denied being out of home medications and needing any refills today.   Paged BERNADETTE Sr to discuss. Approved pt for infusion. She will try to see pt if she gets in for infusion.     Infusion has opening at 1230. Pt was scheduled for labs at provider apt today that she missed.  IB to scheduling for labs at 1200, infusion at 1230.  [employer] contacted to assist with ride.  Update given to Infusion Charge Nurse.  Paged [employer] to assist with ride coordination. [employer] has arrange ride for pt.  Updated Patricia that pt has infusion today at 1230.    Routed to Care Team and RNCC

## 2022-02-17 NOTE — PROGRESS NOTES
Social Work Note: Telephone Call  Oncology Clinic     Data/Intervention:  Patient Name:  Jennifer Cervantes  /Age: 2000, 21 years old     Call From: [facility]riage        Reason for Call:  Transportation     Assessment:   called [employer] to arrange ride through patient's insurance. [employer] arranged  for patient from home with [employer] ([phone]).  Patient will need to call when ready for return ride home ([phone]).      Plan:  Patient is aware of the transportation plan.  available to assist with any other needs.      CARLOS Chavez,[facility]  Hematology/Oncology Social Worker  Phone:[phone] Pager: [phone]

## 2022-02-17 NOTE — PROGRESS NOTES
Infusion Nursing Note:  Jennifer Cervantes presents today for IVF/ Pain Medication.    Patient seen by provider today: Yes: Patricia Mantilla NP in infusion   present during visit today: Not Applicable.    Note: Patient presents to infusion today reporting 10/10 low back pain. States this is her normal sickle cell pain. Denies any fevers, chills, shortness of breath, chest pains or cough. Offers no new concerns today.    After 3 doses of IV Morphine, patient rated pain 4/10 upon time of discharge. Patient feels comfortable discharging home at this time.    Intravenous Access:  Implanted Port.    Treatment Conditions:  Lab Results   Component Value Date    HGB 8.1 (L) 02/17/2022    WBC 12.1 (H) 02/17/2022    ANEU 9.9 (H) 01/29/2022    ANEUTAUTO 10.8 (H) 02/04/2022     02/17/2022      Lab Results   Component Value Date     02/17/2022    POTASSIUM 3.7 02/17/2022    MAG 2.0 11/11/2021    CR 0.52 02/17/2022    MCIAH 8.6 02/17/2022    BILITOTAL 3.0 (H) 02/17/2022    ALBUMIN 4.0 02/17/2022    ALT 92 (H) 02/17/2022     (H) 02/17/2022     Results reviewed, labs MET treatment parameters, ok to proceed with treatment.    Post Infusion Assessment:  Patient tolerated infusion without incident.  Blood return noted pre and post infusion.  Site patent and intact, free from redness, edema or discomfort.  No evidence of extravasations.  Access discontinued per protocol.     Discharge Plan:   Patient declined prescription refills.  Discharge instructions reviewed with: Patient.  Patient and/or family verbalized understanding of discharge instructions and all questions answered.  AVS to patient via [employer].  Patient will return 3/21 for next appointment with Dr. Duncan.   Patient discharged in stable condition accompanied by: self.  Departure Mode: Ambulatory.      Maritza Bautista RN

## 2022-02-17 NOTE — Clinical Note
2/17/2022         RE: Jennifer Cervantes  [address]  [facility] [address]        Dear Colleague,    Thank you for referring your patient, Jennifer Cervantes, to the [facility]. Please see a copy of my visit note below.    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      She was admitted 2/1/21-2/3/21 with a new PE, started on Rivaroxaban. Switched to Eliquis 3/25/21 with RUE DVT.     She was admitted 4/26/21-5/11/21 with sickle cell pain crisis complicated by worsening PE in setting of low Apixaban levels, acute hypoxic respiratory failure, pneumonia, acute chest syndrome s/p exchange transfusion on 4/30 and 5/4. After 2nd exchange her oxygen requirement dramatically improved from 20L to 1-2L 5/5 and she was off oxygen as of 5/6.      She was admitted 7/13/21-7/25/21 for acute on chronic PE, switched to dabigitran + ASA.      Due to worsening hypoxia she underwent VQ scan 11/10/21 which showed acute on chronic PE for which she was admitted 11/10-11/21. During admission was switched to warfarin. Echo WNL and no signs of pulm HTN on right heart cath. She did receive 1 unit pRBC for hgb 6.4 during admission.    She has not been admitted since that time. She has had fewer ED visits as we adjusted her plan to not receive IV narcotics in the ED. With this change she has been in the infusion center almost daily.      She was called today for hematology follow-up.    Interval History:  Desferal this past  weekend.   Hydrea refill  Symbicort daily, hasn't had to use rescue inhaler or neb machines      Current Outpatient Medications   Medication Sig Dispense Refill     acetaminophen (TYLENOL) 325 MG tablet Take 2 tablets (650 mg) by mouth every 6 hours as needed for mild pain 120 tablet 3     albuterol (PROAIR HFA/PROVENTIL HFA/VENTOLIN HFA) 108 (90 Base) MCG/ACT inhaler Inhale 2 puffs into the lungs every 6 hours as needed for shortness of breath / dyspnea or wheezing 8.5 g 3     albuterol (PROVENTIL) (2.5 MG/3ML) 0.083% neb solution Take 1 vial (2.5 mg) by nebulization every 6 hours as needed for shortness of breath / dyspnea or wheezing 12 mL 4     budesonide-formoterol (SYMBICORT) 160-4.5 MCG/ACT Inhaler Inhale 2 puffs into the lungs 2 times daily 10.2 g 3     deferasirox (JADENU) 360 MG tablet Take 4 tablets (1,440 mg) by mouth every evening 120 tablet 4     diphenhydrAMINE (BENADRYL) 25 MG capsule Take 1-2 capsules (25-50 mg) by mouth nightly as needed for sleep 60 capsule 3     EPINEPHrine (ANY BX GENERIC EQUIV) 0.3 MG/0.3ML injection 2-pack Inject 0.3 mLs (0.3 mg) into the muscle as needed for anaphylaxis 1 each 1     Hydroxyurea 1000 MG TABS Take 3,000 mg by mouth daily 90 tablet 3     lidocaine-prilocaine (EMLA) 2.5-2.5 % external cream Apply topically once for 1 dose Use for port site as needed (Patient not taking: Reported on 11/10/2021) 30 g 1     medroxyPROGESTERone (DEPO-PROVERA) 150 MG/ML IM injection Inject 150 mg into the muscle       morphine (MS CONTIN) 15 MG CR tablet Take 1 tablet (15 mg) by mouth every 12 hours 60 tablet 0     naloxone (NARCAN) 4 MG/0.1ML nasal spray Spray 1 spray (4 mg) into one nostril alternating nostrils once as needed for opioid reversal every 2-3 minutes until assistance arrives 0.2 mL 1     ondansetron (ZOFRAN) 8 MG tablet Take 1 tablet (8 mg) by mouth every 8 hours as needed 30 tablet 1     oxyCODONE IR (ROXICODONE) 15 MG tablet Take 1 tablet (15 mg) by mouth every 4  hours as needed for severe pain Goal 4 per day. Max 6 per day. 45 tablet 0     warfarin ANTICOAGULANT (COUMADIN) 5 MG tablet Take 6 tablets daily or as directed by the Coumadin clinic. 190 tablet 3       Past Medical History  Past Medical History:   Diagnosis Date     Anxiety      Bleeding disorder (H)      Blood clotting disorder (H)      Cerebral infarction (H) 2015     Cognitive developmental delay     low IQ. Please recognize when managing pain and planning with her     Depressive disorder      Hemiplegia and hemiparesis following cerebral infarction affecting right dominant side (H)     right hand contractures     Iron overload due to repeated red blood cell transfusions      Migraines      Multiple subsegmental pulmonary emboli without acute cor pulmonale (H) 02/01/2021     Oppositional defiant behavior      Superficial venous thrombosis of arm, right 03/25/2021     Uncomplicated asthma      Past Surgical History:   Procedure Laterality Date     AS INSERT TUNNELED CV 2 CATH W/O PORT/PUMP       CHOLECYSTECTOMY       CV RIGHT HEART CATH MEASUREMENTS RECORDED N/A 11/18/2021    Procedure: Right Heart Cath;  Surgeon: Jackson Stauffer MD;  Location:  HEART CARDIAC CATH LAB     INSERT PORT VASCULAR ACCESS Left 4/21/2021    Procedure: INSERTION, VASCULAR ACCESS PORT (NOT SURE ON SIDE UNTIL REMOVAL);  Surgeon: Rajan More MD;  [place] [facility] [place]     IR CHEST PORT PLACEMENT > 5 YRS OF AGE  4/21/2021     IR CVC NON TUNNEL LINE REMOVAL  5/6/2021     IR CVC NON TUNNEL PLACEMENT  04/07/2020     IR CVC NON TUNNEL PLACEMENT  4/30/2021     IR PORT REMOVAL LEFT  4/21/2021     REMOVE PORT VASCULAR ACCESS Left 4/21/2021    Procedure: REMOVAL, VASCULAR ACCESS PORT LEFT;  Surgeon: Rajan More MD;  [place] [facility] [place]     REPAIR TENDON ELBOW Right 10/02/2019    Procedure: Right Elbow Flexor Lengthening, Flexor Pronator Slide Of Wrist and Finger, Thumb Adductor Lengthening;  Surgeon: Anai Franco MD;  Location:   OR     TONSILLECTOMY Bilateral 10/02/2019    Procedure: Bilateral Tonsillectomy;  Surgeon: Farhana Guy MD;  Location:  OR     [facility] BREAST REDUCTION (INCLUDES LIPO) TIER 3 Bilateral 04/23/2019     Allergies   Allergen Reactions     Contrast Dye      Hives and breathing issues     Fish-Derived Products Hives     Seafood Hives     Diagnostic X-Ray Materials      Gadolinium      Social History   Social History     Tobacco Use     Smoking status: Never Smoker     Smokeless tobacco: Never Used   Substance Use Topics     Alcohol use: Not Currently     Alcohol/week: 0.0 standard drinks     Drug use: Never      Past medical history and social history were reviewed.    Physical Examination:  There were no vitals taken for this visit.  Wt Readings from Last 10 Encounters:   02/13/22 77.1 kg (170 lb)   02/04/22 78.7 kg (173 lb 9.6 oz)   01/31/22 76.9 kg (169 lb 9.6 oz)   01/23/22 74.8 kg (165 lb)   01/17/22 75.2 kg (165 lb 12.8 oz)   01/12/22 77.9 kg (171 lb 12.8 oz)   01/08/22 77.1 kg (170 lb)   01/02/22 77.1 kg (170 lb)   12/27/21 76.5 kg (168 lb 10.4 oz)   12/25/21 77.1 kg (170 lb)     General: Well-appearing female in no acute distress.  Eyes: EOMI, PERRL. No scleral icterus.  ENT: Oral mucosa is moist without lesions or thrush.   Lymphatic: Neck is supple without cervical or supraclavicular lymphadenopathy.   Cardiovascular: RRR No murmurs, gallops, or rubs. No peripheral edema.  Respiratory: CTA bilaterally. No wheezes or crackles.  Gastrointestinal: BS +. Abdomen soft, non-tender. No palpable hepatosplenomegaly or masses.   Neurologic: Cranial nerves II through XII are grossly intact.  Skin: No rashes, petechiae, or bruising noted on exposed skin.    Laboratory Data:  ***      Assessment and Plan:  1. Sickle Cell HgbSS Disease  2. Chronic Pain  Jennifer has been doing well in staying out of the ED lately, however in turn she has been in the infusion center almost daily (10 infusions and 4 ED visits since Jan  1). We discussed that the infusion center and ED should be reserved for an acute pain crisis and not be used to manage chronic pain. In her case it has been difficult to separate the two. Myself and Dr. Duncan discussed prior to visit and relayed to patient our concerns about her frequent need for IV narcotics in addition to her home opioids she takes daily as this is a poor approach to chronic pain.     We discussed the following plan to reduce opioid use for her sickle cell disease and chronic pain:  -Increase Hydrea to 3000mg daily to help lessen frequency of sickle cell pain  -Continue MS Contin and Oxycodone at home  -Limit infusion center visits to two times per week- outside of that should try to manage at home with current medications, heat, rest, compression, warm baths. Did update triage nurses on this  -If unable to manage at home can go to ED but continue to not do IV narcotics in the emergency room. Did add Ketamine to pain plan in ED  -Recommend we try Suboxone in setting of opioid tolerance/poorly controlled pain with chronic opioids. She was not interested at this time but we would continue to recommend this moving forward    -She would like to seek care with different hematology team. Discussed we support her in that as long as she is getting care. Offered to help with this transition but she declined     Continue Desferal for iron overload. Continue Warfarin for history of PE, following with anticoag clinic, not discussed.       *** minutes spent on the date of the encounter doing {2021 E&M time in:857624}     Patricia Mantilla CNP  [facility] [address] [address] [phone]        Again, thank you for allowing me to participate in the care of your patient.        Sincerely,        Patricia Mantilla CNP

## 2022-02-17 NOTE — PATIENT INSTRUCTIONS
Contact Numbers  [facility]: [phone] (for symptom and scheduling needs)    Please call the [facility] Triage line if you experience a temperature greater than or equal to 100.4, shaking chills, have uncontrolled nausea, vomiting and/or diarrhea, dizziness, shortness of breath, chest pain, bleeding, unexplained bruising, or if you have any other new/concerning symptoms, questions or concerns.     If you are having any concerning symptoms or wish to speak to a provider before your next infusion visit, please call your care coordinator or triage to notify them so we can adequately serve you.     If you need a refill on a narcotic prescription or other medication, please call triage before your infusion appointment.          February 2022 Sunday Monday Tuesday Wednesday Thursday Friday Saturday             1     2     3    SPEC INFUSION 2 HR (120 MIN)   1:30 PM   (120 min.)   UC SIPC INFUSION NURSE   [facility] 4    ONC INFUSION 2 HR (120 MIN)  10:30 AM   (120 min.)    ONC INFUSION NURSE   [facility]    Admission  11:15 AM   Kilo Dai MD   [facility]   (Discharge: 2/4/2022)    CT HEAD WO  11:55 AM   (20 min.)   [id number]   [facility] 5       6     7    SPEC INFUSION 3 HR (180 MIN)   1:00 PM   (180 min.)   [facility] SIPC INFUSION NURSE   [facility] 8    VIDEO VISIT RETURN   8:00 AM   (60 min.)   Andrei Machado PA   [facility] 9     10     11    LAB INR   9:15 AM   (15 min.)   UC LAB   [facility] 12       13    Admission   4:19 AM   [facility]   (Discharge: 2/13/2022) 14    SPEC INFUSION 2 HR (120 MIN)   3:00 PM   (120 min.)   [facility] SIPC INFUSION NURSE   [facility] 15     16     17    LAB CENTRAL   7:15 AM   (15  min.)    [facility] DRAW   [facility]    RETURN   7:45 AM   (45 min.)   Patricia Mantilla CNP   [facility]    CANCER REHAB EVAL   8:00 AM   (60 min.)   Corry Navarro PT   [facility]    ONC INFUSION 2 HR (120 MIN)  12:30 PM   (120 min.)    ONC INFUSION NURSE   [facility] 18     19       20     21    NEW HEADACHE   7:25 AM   (60 min.)   Lidia Shaffer APRN CNP   [facility] [place] 22     23     24     25    PRE-PROCEDURE COVID PCR  10:00 AM   (15 min.)    COVID LAB   [facility] 26       27     28    ECHO COMPLETE  10:30 AM   (60 min.)   [id number]   [facility]    NM LUNG SCAN VENT/PERF  11:30 AM   (60 min.)   [id number]   [facility]                                      March 2022 Sunday Monday Tuesday Wednesday Thursday Friday Saturday             1    RETURN PULMONARY HYPERTENSION   1:15 PM   (30 min.)   Gaetano Mccormick MD   [facility] 2     3     4  Happy Birthday!     5       6     7     8     9     10     11     12       13     14     15     16     17     18     19       20     21    LAB PERIPHERAL   1:00 PM   (15 min.)    [facility] DRAW   [facility]    RETURN   1:15 PM   (30 min.)   Eric Duncan MD   [facility] 22     23     24     25     26       27     28     29     30     31                               Lab Results:  Recent Results (from the past 12 hour(s))   INR    Collection Time: 02/17/22 11:34 AM   Result Value Ref Range    INR 1.16 (H) 0.85 - 1.15   Comprehensive metabolic panel    Collection Time: 02/17/22 11:34 AM   Result Value Ref Range    Sodium 140 133 - 144 mmol/L    Potassium 3.7 3.4 - 5.3 mmol/L    Chloride 112 (H) 94 - 109 mmol/L     Carbon Dioxide (CO2) 18 (L) 20 - 32 mmol/L    Anion Gap 10 3 - 14 mmol/L    Urea Nitrogen 5 (L) 7 - 30 mg/dL    Creatinine 0.52 0.52 - 1.04 mg/dL    Calcium 8.6 8.5 - 10.1 mg/dL    Glucose 92 70 - 99 mg/dL    Alkaline Phosphatase 89 40 - 150 U/L     (H) 0 - 45 U/L    ALT 92 (H) 0 - 50 U/L    Protein Total 7.9 6.8 - 8.8 g/dL    Albumin 4.0 3.4 - 5.0 g/dL    Bilirubin Total 3.0 (H) 0.2 - 1.3 mg/dL    GFR Estimate >90 >60 mL/min/1.73m2   CBC with platelets and differential    Collection Time: 02/17/22 11:34 AM   Result Value Ref Range    WBC Count 12.1 (H) 4.0 - 11.0 10e3/uL    RBC Count 2.60 (L) 3.80 - 5.20 10e6/uL    Hemoglobin 8.1 (L) 11.7 - 15.7 g/dL    Hematocrit 22.5 (L) 35.0 - 47.0 %    MCV 87 78 - 100 fL    MCH 31.2 26.5 - 33.0 pg    MCHC 36.0 31.5 - 36.5 g/dL    RDW 29.5 (H) 10.0 - 15.0 %    Platelet Count 341 150 - 450 10e3/uL   Manual Differential    Collection Time: 02/17/22 11:34 AM   Result Value Ref Range    % Neutrophils 58 %    % Lymphocytes 29 %    % Monocytes 6 %    % Eosinophils 5 %    % Basophils 0 %    % Metamyelocytes 2 %    NRBCs per 100 WBC 8 (H) <=0 %    Absolute Neutrophils 7.0 1.6 - 8.3 10e3/uL    Absolute Lymphocytes 3.5 0.8 - 5.3 10e3/uL    Absolute Monocytes 0.7 0.0 - 1.3 10e3/uL    Absolute Eosinophils 0.6 0.0 - 0.7 10e3/uL    Absolute Basophils 0.0 0.0 - 0.2 10e3/uL    Absolute Metamyelocytes 0.2 (H) <=0.0 10e3/uL    Absolute NRBCs 1.0 (H) <=0.0 10e3/uL    RBC Morphology Confirmed RBC Indices     Platelet Assessment  Automated Count Confirmed. Platelet morphology is normal.     Automated Count Confirmed. Platelet morphology is normal.    Polychromasia Marked (A) None Seen    Sickle Cells Marked (A) None Seen    Target Cells Moderate (A) None Seen

## 2022-02-17 NOTE — PROGRESS NOTES
"ANTICOAGULATION  MANAGEMENT    ASSESSMENT     Jennifer Cervantes 21 year old female is on warfarin with subtherapeutic INR result. (Goal INR 2.0-3.0) reported to JOE Shin taking warfarin 20 mg daily since hospital discharge 2/1 and stopping lovenox 3 days ago    Recent labs: (last 7 days)     02/17/22  1134   INR 1.16*       Indication for Anticoagulation: Recurrent VTE       RECOMMENDATION     Warfarin dose was previously under titration- recommend to 45 mg daily x 2 then resume last instructed dose 30 mg daily. Recommend INR twice weekly.    Offer warfarin 10 mg tablets to reduce daily pill burden    Recommend resuming Enoxaparin (Lovenox) 80 mg subq Q 12 hrs (1 mg/kg Q 12 hrs for CrCl >= 60 ml/min and BMI <= 40 kg/m2) today and continue until INR >= 2.0    [facility]    SUBJECTIVE/OBJECTIVE     Jennifer Cervantes, a 21 year old female    Wt Readings from Last 3 Encounters:   02/17/22 76.4 kg (168 lb 8 oz)   02/13/22 77.1 kg (170 lb)   02/04/22 78.7 kg (173 lb 9.6 oz)      Ideal body weight: 54.7 kg (120 lb 9.5 oz)  Adjusted ideal body weight: 63.4 kg (139 lb 12.1 oz)     Estimated body mass index is 28.92 kg/m  as calculated from the following:    Height as of 2/4/22: 1.626 m (5' 4\").    Weight as of an earlier encounter on 2/17/22: 76.4 kg (168 lb 8 oz).    Lab Results   Component Value Date    INR 1.16 (H) 02/17/2022    INR 1.36 (H) 02/04/2022    INR 1.21 (H) 01/31/2022     Lab Results   Component Value Date    HGB 8.1 02/17/2022    HCT 22.5 02/17/2022     02/17/2022     Lab Results   Component Value Date    CR 0.52 02/17/2022    CR 0.48 (L) 02/04/2022    CR 0.49 (L) 01/31/2022     Estimated Creatinine Clearance: 169.8 mL/min (based on SCr of 0.52 mg/dL).    "

## 2022-02-18 NOTE — PROGRESS NOTES
This is a recent snapshot of the patient's [facility] medical record.  For current drug dose and complete information and questions, call [phone] or In Basket pool, [facility] home infusion ([id number])  CSN Number:  [id number]

## 2022-02-21 ENCOUNTER — INFUSION THERAPY VISIT (OUTPATIENT)
Dept: ONCOLOGY | Facility: CLINIC | Age: 22
End: 2022-02-21
Attending: PEDIATRICS
Payer: COMMERCIAL

## 2022-02-21 ENCOUNTER — PATIENT OUTREACH (OUTPATIENT)
Dept: CARE COORDINATION | Facility: CLINIC | Age: 22
End: 2022-02-21
Payer: COMMERCIAL

## 2022-02-21 ENCOUNTER — APPOINTMENT (OUTPATIENT)
Dept: LAB | Facility: CLINIC | Age: 22
End: 2022-02-21
Attending: PEDIATRICS
Payer: COMMERCIAL

## 2022-02-21 ENCOUNTER — TELEPHONE (OUTPATIENT)
Dept: ONCOLOGY | Facility: CLINIC | Age: 22
End: 2022-02-21
Payer: COMMERCIAL

## 2022-02-21 ENCOUNTER — ANTICOAGULATION THERAPY VISIT (OUTPATIENT)
Dept: ANTICOAGULATION | Facility: CLINIC | Age: 22
End: 2022-02-21

## 2022-02-21 VITALS
WEIGHT: 171.1 LBS | TEMPERATURE: 98 F | RESPIRATION RATE: 16 BRPM | BODY MASS INDEX: 29.37 KG/M2 | SYSTOLIC BLOOD PRESSURE: 121 MMHG | OXYGEN SATURATION: 96 % | DIASTOLIC BLOOD PRESSURE: 83 MMHG | HEART RATE: 102 BPM

## 2022-02-21 DIAGNOSIS — I27.82 CHRONIC PULMONARY EMBOLISM WITHOUT ACUTE COR PULMONALE, UNSPECIFIED PULMONARY EMBOLISM TYPE (H): Primary | ICD-10-CM

## 2022-02-21 DIAGNOSIS — D57.1 HB-SS DISEASE WITHOUT CRISIS (H): ICD-10-CM

## 2022-02-21 DIAGNOSIS — D57.00 SICKLE CELL PAIN CRISIS (H): ICD-10-CM

## 2022-02-21 DIAGNOSIS — G81.10 SPASTIC HEMIPLEGIA, UNSPECIFIED ETIOLOGY, UNSPECIFIED LATERALITY (H): ICD-10-CM

## 2022-02-21 LAB
ALBUMIN SERPL-MCNC: 3.9 G/DL (ref 3.4–5)
ALP SERPL-CCNC: 78 U/L (ref 40–150)
ALT SERPL W P-5'-P-CCNC: 72 U/L (ref 0–50)
ANION GAP SERPL CALCULATED.3IONS-SCNC: 11 MMOL/L (ref 3–14)
AST SERPL W P-5'-P-CCNC: 84 U/L (ref 0–45)
BASOPHILS # BLD MANUAL: 0.2 10E3/UL (ref 0–0.2)
BASOPHILS NFR BLD MANUAL: 2 %
BILIRUB SERPL-MCNC: 3.7 MG/DL (ref 0.2–1.3)
BUN SERPL-MCNC: 9 MG/DL (ref 7–30)
CALCIUM SERPL-MCNC: 8.6 MG/DL (ref 8.5–10.1)
CHLORIDE BLD-SCNC: 111 MMOL/L (ref 94–109)
CO2 SERPL-SCNC: 18 MMOL/L (ref 20–32)
CREAT SERPL-MCNC: 0.49 MG/DL (ref 0.52–1.04)
EOSINOPHIL # BLD MANUAL: 0.5 10E3/UL (ref 0–0.7)
EOSINOPHIL NFR BLD MANUAL: 4 %
ERYTHROCYTE [DISTWIDTH] IN BLOOD BY AUTOMATED COUNT: ABNORMAL %
GFR SERPL CREATININE-BSD FRML MDRD: >90 ML/MIN/1.73M2
GLUCOSE BLD-MCNC: 91 MG/DL (ref 70–99)
HCT VFR BLD AUTO: 22.4 % (ref 35–47)
HGB BLD-MCNC: 8 G/DL (ref 11.7–15.7)
INR PPP: 1.24 (ref 0.85–1.15)
LYMPHOCYTES # BLD MANUAL: 1.5 10E3/UL (ref 0.8–5.3)
LYMPHOCYTES NFR BLD MANUAL: 13 %
MCH RBC QN AUTO: 32 PG (ref 26.5–33)
MCHC RBC AUTO-ENTMCNC: 35.7 G/DL (ref 31.5–36.5)
MCV RBC AUTO: 90 FL (ref 78–100)
MONOCYTES # BLD MANUAL: 0.5 10E3/UL (ref 0–1.3)
MONOCYTES NFR BLD MANUAL: 4 %
NEUTROPHILS # BLD MANUAL: 9.1 10E3/UL (ref 1.6–8.3)
NEUTROPHILS NFR BLD MANUAL: 77 %
NRBC # BLD AUTO: 1.5 10E3/UL
NRBC BLD MANUAL-RTO: 13 %
PLAT MORPH BLD: ABNORMAL
PLATELET # BLD AUTO: 330 10E3/UL (ref 150–450)
POLYCHROMASIA BLD QL SMEAR: ABNORMAL
POTASSIUM BLD-SCNC: 3.8 MMOL/L (ref 3.4–5.3)
PROT SERPL-MCNC: 7.7 G/DL (ref 6.8–8.8)
RBC # BLD AUTO: 2.5 10E6/UL (ref 3.8–5.2)
RBC MORPH BLD: ABNORMAL
SICKLE CELLS BLD QL SMEAR: ABNORMAL
SODIUM SERPL-SCNC: 140 MMOL/L (ref 133–144)
TARGETS BLD QL SMEAR: ABNORMAL
WBC # BLD AUTO: 11.8 10E3/UL (ref 4–11)

## 2022-02-21 PROCEDURE — 96361 HYDRATE IV INFUSION ADD-ON: CPT

## 2022-02-21 PROCEDURE — 258N000003 HC RX IP 258 OP 636: Performed by: PEDIATRICS

## 2022-02-21 PROCEDURE — 36591 DRAW BLOOD OFF VENOUS DEVICE: CPT

## 2022-02-21 PROCEDURE — 96376 TX/PRO/DX INJ SAME DRUG ADON: CPT

## 2022-02-21 PROCEDURE — 85027 COMPLETE CBC AUTOMATED: CPT

## 2022-02-21 PROCEDURE — 80053 COMPREHEN METABOLIC PANEL: CPT

## 2022-02-21 PROCEDURE — 250N000011 HC RX IP 250 OP 636: Performed by: PEDIATRICS

## 2022-02-21 PROCEDURE — 85610 PROTHROMBIN TIME: CPT

## 2022-02-21 PROCEDURE — 96374 THER/PROPH/DIAG INJ IV PUSH: CPT

## 2022-02-21 RX ORDER — MORPHINE SULFATE 2 MG/ML
2 INJECTION, SOLUTION INTRAMUSCULAR; INTRAVENOUS
Status: CANCELLED
Start: 2022-04-01

## 2022-02-21 RX ORDER — HEPARIN SODIUM (PORCINE) LOCK FLUSH IV SOLN 100 UNIT/ML 100 UNIT/ML
5 SOLUTION INTRAVENOUS
Status: DISCONTINUED | OUTPATIENT
Start: 2022-02-21 | End: 2022-02-21 | Stop reason: HOSPADM

## 2022-02-21 RX ORDER — HEPARIN SODIUM (PORCINE) LOCK FLUSH IV SOLN 100 UNIT/ML 100 UNIT/ML
5 SOLUTION INTRAVENOUS
Status: CANCELLED | OUTPATIENT
Start: 2022-04-01

## 2022-02-21 RX ORDER — HEPARIN SODIUM (PORCINE) LOCK FLUSH IV SOLN 100 UNIT/ML 100 UNIT/ML
5 SOLUTION INTRAVENOUS
Status: COMPLETED | OUTPATIENT
Start: 2022-02-21 | End: 2022-02-21

## 2022-02-21 RX ORDER — HEPARIN SODIUM,PORCINE 10 UNIT/ML
5 VIAL (ML) INTRAVENOUS
Status: CANCELLED | OUTPATIENT
Start: 2022-04-01

## 2022-02-21 RX ORDER — MORPHINE SULFATE 2 MG/ML
2 INJECTION, SOLUTION INTRAMUSCULAR; INTRAVENOUS
Status: DISCONTINUED | OUTPATIENT
Start: 2022-02-21 | End: 2022-02-21 | Stop reason: HOSPADM

## 2022-02-21 RX ORDER — DIPHENHYDRAMINE HCL 25 MG
25 CAPSULE ORAL
Status: CANCELLED
Start: 2022-04-01

## 2022-02-21 RX ORDER — ONDANSETRON 8 MG/1
8 TABLET, FILM COATED ORAL
Status: CANCELLED
Start: 2022-04-01

## 2022-02-21 RX ADMIN — MORPHINE SULFATE 2 MG: 2 INJECTION, SOLUTION INTRAMUSCULAR; INTRAVENOUS at 12:07

## 2022-02-21 RX ADMIN — DEXTROSE AND SODIUM CHLORIDE 1000 ML: 5; 450 INJECTION, SOLUTION INTRAVENOUS at 10:56

## 2022-02-21 RX ADMIN — Medication 5 ML: at 09:58

## 2022-02-21 RX ADMIN — Medication 5 ML: at 13:33

## 2022-02-21 RX ADMIN — MORPHINE SULFATE 2 MG: 2 INJECTION, SOLUTION INTRAMUSCULAR; INTRAVENOUS at 11:00

## 2022-02-21 RX ADMIN — MORPHINE SULFATE 2 MG: 2 INJECTION, SOLUTION INTRAMUSCULAR; INTRAVENOUS at 13:08

## 2022-02-21 ASSESSMENT — PAIN SCALES - GENERAL: PAINLEVEL: EXTREME PAIN (9)

## 2022-02-21 NOTE — NURSING NOTE
"Chief Complaint   Patient presents with     Infusion     IVF/Pain Medications     Port Draw     labs drawn from port by rn. vs taken     Port accessed with 20 gauge 3/4\" gripper needle and labs drawn by rn.  Port flushed with NS and heparin.  Pt tolerated well.  VS taken.  Pt checked in for next appt.    Gay Rice RN      "

## 2022-02-21 NOTE — PROGRESS NOTES
Social Work Note: Telephone Call  Oncology Clinic     Data/Intervention:  Patient Name:  Jennifer Cervantes  /Age: 2000, 21 years old     Call From: [facility]        Reason for Call:  Transportation     Assessment:   called [employer] to arrange ride through patient's insurance. [place] Ride  arranged  for patient from home with [employer] ([phone]).  Patient will need to call when ready for return ride home ([phone]).      Plan:  Patient is aware of the transportation plan.  available to assist with any other needs.      CARLOS Chavez,[facility]  Hematology/Oncology Social Worker  Phone:[phone] Pager: [phone]

## 2022-02-21 NOTE — PROGRESS NOTES
Infusion Nursing Note:  Jennifer Cervantes presents today for IVF/Pain medications.    Patient seen by provider today: No   present during visit today: Not Applicable.    Note: Patient reported to clinic today with complaint of pain all over her body. After IVF and 3 doses of pain medication patient rated her pain at a 6.    Intravenous Access:  Labs drawn without difficulty.  Implanted Port.  By lab staff.    Treatment Conditions:  Lab Results   Component Value Date    HGB 8.0 (L) 02/21/2022    WBC 11.8 (H) 02/21/2022    ANEU 9.1 (H) 02/21/2022    ANEUTAUTO 10.8 (H) 02/04/2022     02/21/2022      Lab Results   Component Value Date     02/21/2022    POTASSIUM 3.8 02/21/2022    MAG 2.0 11/11/2021    CR 0.49 (L) 02/21/2022    MICAH 8.6 02/21/2022    BILITOTAL 3.7 (H) 02/21/2022    ALBUMIN 3.9 02/21/2022    ALT 72 (H) 02/21/2022    AST 84 (H) 02/21/2022     Post Infusion Assessment:  Patient tolerated infusion without incident.  Blood return noted pre and post infusion.  Site patent and intact, free from redness, edema or discomfort.  No evidence of extravasations.  Access discontinued per protocol.      Discharge Plan:   Patient declined prescription refills.  Discharge instructions reviewed with: Patient.  Patient and/or family verbalized understanding of discharge instructions and all questions answered.  Copy of AVS reviewed with patient and/or family.  Patient will return 3/21/22 for next appointment.  Patient discharged in stable condition accompanied by: self.  Departure Mode: Ambulatory.  Face to Face time: 10 minutes.    Aravind Feldman RN

## 2022-02-21 NOTE — PATIENT INSTRUCTIONS
[facility] Main Line: [phone]    Call triage nurse with chills and/or temperature greater than or equal to 100.4, uncontrolled nausea/vomiting, diarrhea, constipation, dizziness, shortness of breath, chest pain, bleeding, unexplained bruising, or any new/concerning symptoms, questions/concerns.   If you are having any concerning symptoms or wish to speak to a provider before your next infusion visit, please call your care coordinator or triage to notify them so we can adequately serve you.   Nurse Triage line:  [phone]    If after hours, weekends, or holidays, call main hospital  and ask for Oncology doctor on call @ [phone]  Results for HIMANSHU AL (MRN [id number]) as of 2/21/2022 13:11   Ref. Range 2/21/2022 10:05   Sodium Latest Ref Range: 133 - 144 mmol/L 140   Potassium Latest Ref Range: 3.4 - 5.3 mmol/L 3.8   Chloride Latest Ref Range: 94 - 109 mmol/L 111 (H)   Carbon Dioxide Latest Ref Range: 20 - 32 mmol/L 18 (L)   Urea Nitrogen Latest Ref Range: 7 - 30 mg/dL 9   Creatinine Latest Ref Range: 0.52 - 1.04 mg/dL 0.49 (L)   GFR Estimate Latest Ref Range: >60 mL/min/1.73m2 >90   Calcium Latest Ref Range: 8.5 - 10.1 mg/dL 8.6   Anion Gap Latest Ref Range: 3 - 14 mmol/L 11   Albumin Latest Ref Range: 3.4 - 5.0 g/dL 3.9   Protein Total Latest Ref Range: 6.8 - 8.8 g/dL 7.7   Bilirubin Total Latest Ref Range: 0.2 - 1.3 mg/dL 3.7 (H)   Alkaline Phosphatase Latest Ref Range: 40 - 150 U/L 78   ALT Latest Ref Range: 0 - 50 U/L 72 (H)   AST Latest Ref Range: 0 - 45 U/L 84 (H)   Glucose Latest Ref Range: 70 - 99 mg/dL 91   WBC Latest Ref Range: 4.0 - 11.0 10e3/uL 11.8 (H)   Hemoglobin Latest Ref Range: 11.7 - 15.7 g/dL 8.0 (L)   Hematocrit Latest Ref Range: 35.0 - 47.0 % 22.4 (L)   Platelet Count Latest Ref Range: 150 - 450 10e3/uL 330   RBC Count Latest Ref Range: 3.80 - 5.20 10e6/uL 2.50 (L)   MCV Latest Ref Range: 78 - 100 fL 90   MCH Latest Ref Range: 26.5 - 33.0 pg 32.0   MCHC  Latest Ref Range: 31.5 - 36.5 g/dL 35.7   RDW Unknown See Comment   % Neutrophils Latest Units: % 77   % Lymphocytes Latest Units: % 13   % Monocytes Latest Units: % 4   % Eosinophils Latest Units: % 4   % Basophils Latest Units: % 2   Absolute Basophils Latest Ref Range: 0.0 - 0.2 10e3/uL 0.2   NRBC/W Latest Ref Range: <=0 % 13 (H)   Absolute Neutrophil Latest Ref Range: 1.6 - 8.3 10e3/uL 9.1 (H)   Absolute Lymphocytes Latest Ref Range: 0.8 - 5.3 10e3/uL 1.5   Absolute Monocytes Latest Ref Range: 0.0 - 1.3 10e3/uL 0.5   Absolute Eosinophils Latest Ref Range: 0.0 - 0.7 10e3/uL 0.5   Absolute NRBCs Latest Ref Range: <=0.0 10e3/uL 1.5 (H)   RBC Morphology Unknown Confirmed RBC Indices   Platelet Morphology Latest Ref Range: Automated Count Confirmed. Platelet morphology is normal.  Automated Count Confirmed. Platelet morphology is normal.   Polychromasia Latest Ref Range: None Seen  Marked (A)   Sickle Cells Latest Ref Range: None Seen  Marked (A)   Target Cells Latest Ref Range: None Seen  Moderate (A)   INR Latest Ref Range: 0.85 - 1.15  1.24 (H)

## 2022-02-21 NOTE — TELEPHONE ENCOUNTER
[facility] Telephone Triage Note    The following symptoms were reported:   Typical  Description:            Onset:  Saturday  Location: lower back  Character: Sharp           Intensity: 9/10    Accompanying Signs & Symptoms:  no involvement of extremities     Chest Pain:  denies     Shortness of Breath:  denies     Fever:  denies     Chills:  denies   Cough/sore throat:  denies  Other:  Needs a ride    Therapies Tried and outcome: Last oxycodone and MS Contin taken around 6am.     Improved by: minimal improvement, requires IVF/Pain intermittently.     The following provider was consulted:  Meets protocol    The following advice/orders were given, and/or interventions recommended: Meets protocol    Patient instructions and/or follow up:  This writer reminded Jennifer of appt this morning at 7:40am with Neurology for headache hx and Labs for INR at 8:45am, Jennifer said she had to call and reschedule the headache and was wondering if labs could be done closer to the 10:30am infusion time.   Labs at 10am and IVf/Pain at 10:30am.      Abdullahi paged [phone] for transportation assist    Scheduling request sent    If you do not hear from the infusion center by 2pm then you will not be able to get in for an infusion today. If symptoms worsen while waiting for call back, and/or you experience fever, chills, SOB, chest pain, cough, n/v, dizziness, numbness, swelling, discoloration of extremities, then seek emergency evaluation in Emergency Department.

## 2022-02-22 DIAGNOSIS — D57.00 SICKLE CELL PAIN CRISIS (H): ICD-10-CM

## 2022-02-22 DIAGNOSIS — I82.611 SUPERFICIAL VENOUS THROMBOSIS OF ARM, RIGHT: ICD-10-CM

## 2022-02-22 RX ORDER — OXYCODONE HYDROCHLORIDE 15 MG/1
15 TABLET ORAL EVERY 4 HOURS PRN
Qty: 45 TABLET | Refills: 0 | Status: SHIPPED | OUTPATIENT
Start: 2022-02-22 | End: 2022-03-03

## 2022-02-22 NOTE — TELEPHONE ENCOUNTER
[facility] Triage - NO  ACCESS    Refill Request    Date of most recent appointment:  2/17/22 Jose Rafael  Next upcoming appointment:   3/21/22 Perla    Medication requested:  Oxycodone 15mg  Quantity:  45  Last fill date:  2/11/22  Person requesting refill:  Jennifer  Notes:      Prescribing provider(s):  JOSIANE Elena  Number left: 5 left  How many during the day: 6  - takes the [place]  Pharmacy: [facility]    Routing to Patricia Mantilla

## 2022-02-23 NOTE — PROGRESS NOTES
This is a recent snapshot of the patient's [facility] medical record.  For current drug dose and complete information and questions, call [phone] or In Basket pool, fv home infusion ([id number])  CSN Number:  [id number]

## 2022-02-24 ENCOUNTER — HOME INFUSION (PRE-WILLOW HOME INFUSION) (OUTPATIENT)
Dept: PHARMACY | Facility: CLINIC | Age: 22
End: 2022-02-24

## 2022-02-24 ENCOUNTER — PATIENT OUTREACH (OUTPATIENT)
Dept: CARE COORDINATION | Facility: CLINIC | Age: 22
End: 2022-02-24
Payer: COMMERCIAL

## 2022-02-24 ENCOUNTER — ANTICOAGULATION THERAPY VISIT (OUTPATIENT)
Dept: ANTICOAGULATION | Facility: CLINIC | Age: 22
End: 2022-02-24
Payer: COMMERCIAL

## 2022-02-24 ENCOUNTER — TELEPHONE (OUTPATIENT)
Dept: ONCOLOGY | Facility: CLINIC | Age: 22
End: 2022-02-24
Payer: COMMERCIAL

## 2022-02-24 ENCOUNTER — INFUSION THERAPY VISIT (OUTPATIENT)
Dept: ONCOLOGY | Facility: CLINIC | Age: 22
End: 2022-02-24
Attending: PEDIATRICS
Payer: COMMERCIAL

## 2022-02-24 VITALS
RESPIRATION RATE: 16 BRPM | SYSTOLIC BLOOD PRESSURE: 129 MMHG | DIASTOLIC BLOOD PRESSURE: 81 MMHG | HEART RATE: 106 BPM | OXYGEN SATURATION: 93 % | TEMPERATURE: 98 F

## 2022-02-24 DIAGNOSIS — G81.10 SPASTIC HEMIPLEGIA, UNSPECIFIED ETIOLOGY, UNSPECIFIED LATERALITY (H): Primary | ICD-10-CM

## 2022-02-24 DIAGNOSIS — I27.82 CHRONIC PULMONARY EMBOLISM WITHOUT ACUTE COR PULMONALE, UNSPECIFIED PULMONARY EMBOLISM TYPE (H): Primary | ICD-10-CM

## 2022-02-24 DIAGNOSIS — D57.00 SICKLE CELL PAIN CRISIS (H): ICD-10-CM

## 2022-02-24 PROCEDURE — 96374 THER/PROPH/DIAG INJ IV PUSH: CPT

## 2022-02-24 PROCEDURE — 250N000011 HC RX IP 250 OP 636: Performed by: PEDIATRICS

## 2022-02-24 PROCEDURE — 96376 TX/PRO/DX INJ SAME DRUG ADON: CPT

## 2022-02-24 PROCEDURE — 96361 HYDRATE IV INFUSION ADD-ON: CPT

## 2022-02-24 PROCEDURE — 258N000003 HC RX IP 258 OP 636: Performed by: PEDIATRICS

## 2022-02-24 PROCEDURE — 96375 TX/PRO/DX INJ NEW DRUG ADDON: CPT

## 2022-02-24 RX ORDER — ASPIRIN 81 MG/1
81 TABLET, CHEWABLE ORAL 2 TIMES DAILY
Qty: 60 TABLET | Refills: 11 | Status: SHIPPED | OUTPATIENT
Start: 2022-02-24 | End: 2022-03-21

## 2022-02-24 RX ORDER — HEPARIN SODIUM,PORCINE 10 UNIT/ML
5 VIAL (ML) INTRAVENOUS
Status: CANCELLED | OUTPATIENT
Start: 2022-04-01

## 2022-02-24 RX ORDER — ONDANSETRON 8 MG/1
8 TABLET, ORALLY DISINTEGRATING ORAL
Status: DISCONTINUED | OUTPATIENT
Start: 2022-02-24 | End: 2022-02-24 | Stop reason: HOSPADM

## 2022-02-24 RX ORDER — ONDANSETRON 8 MG/1
8 TABLET, FILM COATED ORAL
Status: CANCELLED
Start: 2022-04-01

## 2022-02-24 RX ORDER — HEPARIN SODIUM (PORCINE) LOCK FLUSH IV SOLN 100 UNIT/ML 100 UNIT/ML
5 SOLUTION INTRAVENOUS
Status: DISCONTINUED | OUTPATIENT
Start: 2022-02-24 | End: 2022-02-24 | Stop reason: HOSPADM

## 2022-02-24 RX ORDER — MORPHINE SULFATE 2 MG/ML
2 INJECTION, SOLUTION INTRAMUSCULAR; INTRAVENOUS
Status: DISCONTINUED | OUTPATIENT
Start: 2022-02-24 | End: 2022-02-24 | Stop reason: HOSPADM

## 2022-02-24 RX ORDER — DIPHENHYDRAMINE HCL 25 MG
25 CAPSULE ORAL
Status: CANCELLED
Start: 2022-04-01

## 2022-02-24 RX ORDER — DIPHENHYDRAMINE HCL 25 MG
25 CAPSULE ORAL
Status: DISCONTINUED | OUTPATIENT
Start: 2022-02-24 | End: 2022-02-24 | Stop reason: HOSPADM

## 2022-02-24 RX ORDER — MORPHINE SULFATE 2 MG/ML
2 INJECTION, SOLUTION INTRAMUSCULAR; INTRAVENOUS
Status: CANCELLED
Start: 2022-04-01

## 2022-02-24 RX ORDER — HEPARIN SODIUM (PORCINE) LOCK FLUSH IV SOLN 100 UNIT/ML 100 UNIT/ML
5 SOLUTION INTRAVENOUS
Status: CANCELLED | OUTPATIENT
Start: 2022-04-01

## 2022-02-24 RX ADMIN — DEXTROSE AND SODIUM CHLORIDE 1000 ML: 5; 450 INJECTION, SOLUTION INTRAVENOUS at 13:38

## 2022-02-24 RX ADMIN — MORPHINE SULFATE 2 MG: 2 INJECTION, SOLUTION INTRAMUSCULAR; INTRAVENOUS at 14:41

## 2022-02-24 RX ADMIN — MORPHINE SULFATE 2 MG: 2 INJECTION, SOLUTION INTRAMUSCULAR; INTRAVENOUS at 15:44

## 2022-02-24 RX ADMIN — MORPHINE SULFATE 2 MG: 2 INJECTION, SOLUTION INTRAMUSCULAR; INTRAVENOUS at 13:39

## 2022-02-24 RX ADMIN — Medication 5 ML: at 16:30

## 2022-02-24 ASSESSMENT — PAIN SCALES - GENERAL: PAINLEVEL: EXTREME PAIN (9)

## 2022-02-24 NOTE — PROGRESS NOTES
ANTICOAGULATION  MANAGEMENT    Jennifer Cervantes is being discharged from the [facility] Anticoagulation Management Program ([facility]).    Reason for discharge: Therapy complete    Anticoagulation episode resolved, ACC referral closed and INR Standing order discontinued    If patient needs warfarin management in the future, please send a new referral    Gita Khan RN

## 2022-02-24 NOTE — PATIENT INSTRUCTIONS
[facility] Triage and after hours / weekends / holidays:  [phone]    Please call the triage or after hours line if you experience a temperature greater than or equal to 100.4, shaking chills, have uncontrolled nausea, vomiting and/or diarrhea, dizziness, shortness of breath, chest pain, bleeding, unexplained bruising, or if you have any other new/concerning symptoms, questions or concerns.      If you are having any concerning symptoms or wish to speak to a provider before your next infusion visit, please call your care coordinator or triage to notify them so we can adequately serve you.     If you need a refill on a narcotic prescription or other medication, please call before your infusion appointment.

## 2022-02-24 NOTE — PROGRESS NOTES
Social Work Note: Telephone Call  Oncology Clinic     Data/Intervention:  Patient Name:  Jennifer Cervantes  /Age: 2000, 21 years old      Call From: [facility]        Reason for Call:  Transportation     Assessment:   called [employer] ([phone]) to arrange ride through patient's insurance. [employer] arranged  for patient from home with [employer] ([phone]).  Patient will need to call when ready for return ride home ([phone]).      Plan:  Patient is aware of the transportation plan.  available to assist with any other needs.      CARLOS Chavez,[facility]  Hematology/Oncology Social Worker  Phone:[phone] Pager: [phone]

## 2022-02-24 NOTE — TELEPHONE ENCOUNTER
Pt called in to triage requesting IVF pain meds for lower back pain rated 9/10 x since last night days. Stated last took prn oxycodone and ms contin at 6 a.m. this morning without relief. Denied any fevers, chills, cough, sob, chest pain, numbness or tingling or other symptoms not typical of sickle cell pain.   Pt's last infusion was 2/2122, last clinic visit 2/17/22 with follow-up on 3/21/22with Dr. Duncan..   Patient states needs transportation--25 min away.  Pt denied being out of home medications and needing any refills today.   Pt qualifies for sickle cell standing order protocol.    Added to Infusion wait list.    [employer] can offer [address] apt  Called Jennifer who confirmed she can come to apt.  Contacted SW to assist with ride.  IB sent to scheduling. Contacted Charge nurse to let her know pt is confirmed.    Routed to Provider and RNCC.

## 2022-02-24 NOTE — PROGRESS NOTES
Jennifer has been on warfarin for several months after receiving to fail other anticoagulants.  However, since early December, she is struggled to be therapeutic at all despite increasingly higher warfarin dosing.  She has more recently been on Lovenox to help with bridging but that bridge has never been successful.  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  She also had a fall and hit her head with some force a few weeks ago while on warfarin (CT head was negative).  Given our time since the last PEs, and the risks that we have been taking with her being on anticoagulation but not actually being therapeutic, I discussed with her and her mom my recommendation to now go back to aspirin and hold off on further full dose anticoagulation. I discussed this challenge with other hematologist colleagues. I shared my concerns about the different anticoagulants that we have tried thus far but particularly given her risk of falls, I think those risks outweigh the benefit of partial anticoagulation as we have been doing for the last several months (not purposefully) so we will stop the warfarin and Lovenox today.    I will put her on BID Aspirin 81 mg (previously just daily) for now to provide a bit more benefit but may scale that back to just daily dosing with time.        Eric Duncan MD  Hematologist  Division of Hematology, Oncology, and Transplantation  [facility]  [facility]  Pager: [phone]

## 2022-02-24 NOTE — PROGRESS NOTES
Infusion Nursing Note:  Jennifer Cervantes presents today for IV fluids and Pain Medication.      Note: Jennifer has been afebrile and denies signs and symptoms of infection including: cough, SOB, sore throat, diarrhea, vomiting, rash, or pain with urination.      She presents today with lower back pain 9/10.  Please see triage note for additonal details.  While in infusion note pt was given 1L of D5NS and 3 doses of morphine.  Prior to discharge pain was 6/10 and pt felt comfortable discharging to manage pain at home    Intravenous Access:  Implanted Port.      Post Infusion Assessment:  Patient tolerated infusion without incident.       Discharge Plan:   Copy of AVS reviewed with patient and/or family.  Patient will return PRN for next appointment.  AVS to patient via [employer].  Patient will return 3/21/22 to see Dr Duncan in clinic    Marina Leblanc RN                      
Alert/Awake

## 2022-02-28 ENCOUNTER — ALLIED HEALTH/NURSE VISIT (OUTPATIENT)
Dept: INTERNAL MEDICINE | Facility: CLINIC | Age: 22
End: 2022-02-28
Payer: COMMERCIAL

## 2022-02-28 ENCOUNTER — TELEPHONE (OUTPATIENT)
Dept: ONCOLOGY | Facility: CLINIC | Age: 22
End: 2022-02-28

## 2022-02-28 ENCOUNTER — LAB (OUTPATIENT)
Dept: LAB | Facility: CLINIC | Age: 22
End: 2022-02-28
Attending: PHYSICIAN ASSISTANT
Payer: COMMERCIAL

## 2022-02-28 ENCOUNTER — PATIENT OUTREACH (OUTPATIENT)
Dept: CARE COORDINATION | Facility: CLINIC | Age: 22
End: 2022-02-28

## 2022-02-28 ENCOUNTER — INFUSION THERAPY VISIT (OUTPATIENT)
Dept: TRANSPLANT | Facility: CLINIC | Age: 22
End: 2022-02-28
Attending: PEDIATRICS
Payer: COMMERCIAL

## 2022-02-28 ENCOUNTER — HOME INFUSION (PRE-WILLOW HOME INFUSION) (OUTPATIENT)
Dept: PHARMACY | Facility: CLINIC | Age: 22
End: 2022-02-28

## 2022-02-28 VITALS
DIASTOLIC BLOOD PRESSURE: 87 MMHG | RESPIRATION RATE: 16 BRPM | OXYGEN SATURATION: 98 % | SYSTOLIC BLOOD PRESSURE: 147 MMHG | HEART RATE: 102 BPM | TEMPERATURE: 98.1 F

## 2022-02-28 DIAGNOSIS — Z30.42 ENCOUNTER FOR SURVEILLANCE OF INJECTABLE CONTRACEPTIVE: Primary | ICD-10-CM

## 2022-02-28 DIAGNOSIS — Z11.59 ENCOUNTER FOR SCREENING FOR OTHER VIRAL DISEASES: ICD-10-CM

## 2022-02-28 DIAGNOSIS — Z11.59 ENCOUNTER FOR SCREENING FOR OTHER VIRAL DISEASES: Primary | ICD-10-CM

## 2022-02-28 DIAGNOSIS — D57.00 SICKLE CELL PAIN CRISIS (H): ICD-10-CM

## 2022-02-28 DIAGNOSIS — G81.10 SPASTIC HEMIPLEGIA, UNSPECIFIED ETIOLOGY, UNSPECIFIED LATERALITY (H): Primary | ICD-10-CM

## 2022-02-28 LAB — SARS-COV-2 RNA RESP QL NAA+PROBE: NEGATIVE

## 2022-02-28 PROCEDURE — 99207 PR NO CHARGE NURSE ONLY: CPT

## 2022-02-28 PROCEDURE — 258N000003 HC RX IP 258 OP 636: Performed by: PEDIATRICS

## 2022-02-28 PROCEDURE — 96376 TX/PRO/DX INJ SAME DRUG ADON: CPT | Mod: XS

## 2022-02-28 PROCEDURE — 99000 SPECIMEN HANDLING OFFICE-LAB: CPT | Performed by: PATHOLOGY

## 2022-02-28 PROCEDURE — 96372 THER/PROPH/DIAG INJ SC/IM: CPT

## 2022-02-28 PROCEDURE — 96361 HYDRATE IV INFUSION ADD-ON: CPT

## 2022-02-28 PROCEDURE — 250N000011 HC RX IP 250 OP 636: Performed by: PEDIATRICS

## 2022-02-28 PROCEDURE — U0003 INFECTIOUS AGENT DETECTION BY NUCLEIC ACID (DNA OR RNA); SEVERE ACUTE RESPIRATORY SYNDROME CORONAVIRUS 2 (SARS-COV-2) (CORONAVIRUS DISEASE [COVID-19]), AMPLIFIED PROBE TECHNIQUE, MAKING USE OF HIGH THROUGHPUT TECHNOLOGIES AS DESCRIBED BY CMS-2020-01-R: HCPCS | Mod: 90 | Performed by: PATHOLOGY

## 2022-02-28 PROCEDURE — U0005 INFEC AGEN DETEC AMPLI PROBE: HCPCS | Mod: 90 | Performed by: PATHOLOGY

## 2022-02-28 PROCEDURE — 96374 THER/PROPH/DIAG INJ IV PUSH: CPT | Mod: XS

## 2022-02-28 RX ORDER — HEPARIN SODIUM,PORCINE 10 UNIT/ML
5 VIAL (ML) INTRAVENOUS
Status: CANCELLED | OUTPATIENT
Start: 2022-04-01

## 2022-02-28 RX ORDER — ONDANSETRON 8 MG/1
8 TABLET, FILM COATED ORAL
Status: CANCELLED
Start: 2022-04-01

## 2022-02-28 RX ORDER — HEPARIN SODIUM (PORCINE) LOCK FLUSH IV SOLN 100 UNIT/ML 100 UNIT/ML
5 SOLUTION INTRAVENOUS ONCE
Status: COMPLETED | OUTPATIENT
Start: 2022-02-28 | End: 2022-02-28

## 2022-02-28 RX ORDER — HEPARIN SODIUM (PORCINE) LOCK FLUSH IV SOLN 100 UNIT/ML 100 UNIT/ML
5 SOLUTION INTRAVENOUS
Status: CANCELLED | OUTPATIENT
Start: 2022-04-01

## 2022-02-28 RX ORDER — MORPHINE SULFATE 2 MG/ML
2 INJECTION, SOLUTION INTRAMUSCULAR; INTRAVENOUS
Status: DISCONTINUED | OUTPATIENT
Start: 2022-02-28 | End: 2022-02-28 | Stop reason: HOSPADM

## 2022-02-28 RX ORDER — DIPHENHYDRAMINE HCL 25 MG
25 CAPSULE ORAL
Status: CANCELLED
Start: 2022-04-01

## 2022-02-28 RX ORDER — MORPHINE SULFATE 2 MG/ML
2 INJECTION, SOLUTION INTRAMUSCULAR; INTRAVENOUS
Status: CANCELLED
Start: 2022-04-01

## 2022-02-28 RX ADMIN — MORPHINE SULFATE 2 MG: 2 INJECTION, SOLUTION INTRAMUSCULAR; INTRAVENOUS at 14:26

## 2022-02-28 RX ADMIN — MEDROXYPROGESTERONE ACETATE 150 MG: 150 INJECTION, SUSPENSION INTRAMUSCULAR at 09:35

## 2022-02-28 RX ADMIN — MORPHINE SULFATE 2 MG: 2 INJECTION, SOLUTION INTRAMUSCULAR; INTRAVENOUS at 13:25

## 2022-02-28 RX ADMIN — SODIUM CHLORIDE, PRESERVATIVE FREE 5 ML: 5 INJECTION INTRAVENOUS at 14:32

## 2022-02-28 RX ADMIN — DEXTROSE AND SODIUM CHLORIDE 1000 ML: 5; 450 INJECTION, SOLUTION INTRAVENOUS at 12:25

## 2022-02-28 RX ADMIN — MORPHINE SULFATE 2 MG: 2 INJECTION, SOLUTION INTRAMUSCULAR; INTRAVENOUS at 12:25

## 2022-02-28 ASSESSMENT — PAIN SCALES - GENERAL: PAINLEVEL: WORST PAIN (10)

## 2022-02-28 NOTE — PROGRESS NOTES
Social Work Note: Telephone Call  Oncology Clinic     Data/Intervention:  Patient Name:  Jennifer Cervantes  /Age: 2000, 21 years old     Call From: Patient        Reason for Call:  Transportation     Assessment:    SW received phone call from patient who requested assistance with a return ride back home from the clinic. SW used a taxi voucher and set up a will call return ride for patient. Patient appreciative of SW's assistance.       Plan:  Patient is aware of the transportation plan.  available to assist with any other needs.      CARLOS Chavez,[facility]  Hematology/Oncology Social Worker  Phone:[phone] Pager: [phone]

## 2022-02-28 NOTE — NURSING NOTE
Infusion Nursing Note:  Jennifer Cervantes presents today for add-on infusion.    Patient seen by provider today: No   present during visit today: Not Applicable.    Note: Patient received 1 L D5NaCl bolus over 2 hours. Patient also received morphine x3 for 9/10 low back pain. Heat packs applied also. Patient stable upon discharge and pain has improved.      Intravenous Access:  Implanted Port.    Treatment Conditions:  Results reviewed, provider approved infusion, ok to proceed with treatment.      Post Infusion Assessment:  Patient tolerated infusion without incident.       Discharge Plan:   Copy of AVS reviewed with patient and/or family.       Vicenta Boone RN

## 2022-02-28 NOTE — LETTER
2/28/2022         RE: Jennifer Cervantes  [address]  [place] [address]        Dear Colleague,    Thank you for referring your patient, Jennifer Cervantes, to the [facility] BLOOD AND MARROW TRANSPLANT PROGRAM [place]. Please see a copy of my visit note below.    Infusion Nursing Note:  Jennifer Cervantes presents today for add-on infusion.    Patient seen by provider today: No   present during visit today: Not Applicable.     Note: Patient received 1 L D5NaCl bolus over 2 hours. Patient also received morphine x3 for 9/10 low back pain. Heat packs applied also. Patient stable upon discharge and pain has improved.        Intravenous Access:  Implanted Port. Pt requested to stay accessed due to home infusion     Treatment Conditions:  Results reviewed, provider approved infusion, ok to proceed with treatment.        Post Infusion Assessment:  Patient tolerated infusion without incident.         Discharge Plan:   Copy of AVS reviewed with patient and/or family.         Vicenta Boone RN      Again, thank you for allowing me to participate in the care of your patient.        Sincerely,        [facility]

## 2022-02-28 NOTE — PROGRESS NOTES
Jennifer Cervantes comes into clinic today at the request of [facility] for DEPO PROVERA injection.      This service provided today was under the direct supervision of Dr. Woodruff, who was available if needed.    Jaida Martell, EMT at 9:27 AM on 2/28/2022

## 2022-02-28 NOTE — PROGRESS NOTES
Infusion Nursing Note:  Jennifer Cervantes presents today for add-on infusion.    Patient seen by provider today: No   present during visit today: Not Applicable.     Note: Patient received 1 L D5NaCl bolus over 2 hours. Patient also received morphine x3 for 9/10 low back pain. Heat packs applied also. Patient stable upon discharge and pain has improved.        Intravenous Access:  Implanted Port. Pt requested to stay accessed due to home infusion     Treatment Conditions:  Results reviewed, provider approved infusion, ok to proceed with treatment.        Post Infusion Assessment:  Patient tolerated infusion without incident.         Discharge Plan:   Copy of AVS reviewed with patient and/or family.         Vicenta Boone RN

## 2022-02-28 NOTE — TELEPHONE ENCOUNTER
Ryan called from Nuclear Medicine,     Due to requirement of pt needed a Preprocedure COVID test,     Nuclear medicine unable to do appt b/c it is an aerosol procedure.     Ryan will follow up with Dr. Mccormick/cardiology team on this requirement, this writer also will send this update to Hematology care team.     This writer informed Ryan, pt can come to [facility] for IVF/Pain appt when ready. Pt will also have Mediport still accessed upon arrival to IVF/Pain infusion appt.

## 2022-02-28 NOTE — NURSING NOTE
Chief Complaint   Patient presents with     Imm/Inj     DEPO injection per patient       Deo Kennedy EMT at 9:20 AM on 2/28/2022.

## 2022-03-01 NOTE — PROGRESS NOTES
This is a recent snapshot of the patient's [facility] medical record.  For current drug dose and complete information and questions, call [phone] or In Basket pool, fv home infusion ([id number])  CSN Number:  [id number]

## 2022-03-01 NOTE — PROGRESS NOTES
This is a recent snapshot of the patient's [facility] medical record.  For current drug dose and complete information and questions, call [phone] or In Basket pool, fv home infusion ([id number])  CSN Number:  [id number]

## 2022-03-03 ENCOUNTER — TELEPHONE (OUTPATIENT)
Dept: ONCOLOGY | Facility: CLINIC | Age: 22
End: 2022-03-03

## 2022-03-03 ENCOUNTER — PATIENT OUTREACH (OUTPATIENT)
Dept: CARE COORDINATION | Facility: CLINIC | Age: 22
End: 2022-03-03

## 2022-03-03 ENCOUNTER — INFUSION THERAPY VISIT (OUTPATIENT)
Dept: ONCOLOGY | Facility: CLINIC | Age: 22
End: 2022-03-03
Attending: PEDIATRICS
Payer: COMMERCIAL

## 2022-03-03 VITALS
TEMPERATURE: 98.5 F | RESPIRATION RATE: 12 BRPM | SYSTOLIC BLOOD PRESSURE: 127 MMHG | HEART RATE: 123 BPM | DIASTOLIC BLOOD PRESSURE: 84 MMHG | OXYGEN SATURATION: 95 %

## 2022-03-03 DIAGNOSIS — G81.10 SPASTIC HEMIPLEGIA, UNSPECIFIED ETIOLOGY, UNSPECIFIED LATERALITY (H): Primary | ICD-10-CM

## 2022-03-03 DIAGNOSIS — D57.00 SICKLE CELL PAIN CRISIS (H): ICD-10-CM

## 2022-03-03 PROCEDURE — 96374 THER/PROPH/DIAG INJ IV PUSH: CPT | Mod: 59

## 2022-03-03 PROCEDURE — 96361 HYDRATE IV INFUSION ADD-ON: CPT

## 2022-03-03 PROCEDURE — 250N000011 HC RX IP 250 OP 636: Performed by: PEDIATRICS

## 2022-03-03 PROCEDURE — 258N000003 HC RX IP 258 OP 636: Performed by: PEDIATRICS

## 2022-03-03 PROCEDURE — 96376 TX/PRO/DX INJ SAME DRUG ADON: CPT

## 2022-03-03 RX ORDER — ONDANSETRON 8 MG/1
8 TABLET, ORALLY DISINTEGRATING ORAL
Status: DISCONTINUED | OUTPATIENT
Start: 2022-03-03 | End: 2022-03-03

## 2022-03-03 RX ORDER — HEPARIN SODIUM,PORCINE 10 UNIT/ML
5 VIAL (ML) INTRAVENOUS
Status: CANCELLED | OUTPATIENT
Start: 2022-04-01

## 2022-03-03 RX ORDER — HEPARIN SODIUM,PORCINE 10 UNIT/ML
5 VIAL (ML) INTRAVENOUS
Status: DISCONTINUED | OUTPATIENT
Start: 2022-03-03 | End: 2022-03-03 | Stop reason: HOSPADM

## 2022-03-03 RX ORDER — HEPARIN SODIUM (PORCINE) LOCK FLUSH IV SOLN 100 UNIT/ML 100 UNIT/ML
5 SOLUTION INTRAVENOUS
Status: CANCELLED | OUTPATIENT
Start: 2022-04-01

## 2022-03-03 RX ORDER — OXYCODONE HYDROCHLORIDE 15 MG/1
15 TABLET ORAL EVERY 4 HOURS PRN
Qty: 45 TABLET | Refills: 0 | Status: SHIPPED | OUTPATIENT
Start: 2022-03-03 | End: 2022-03-11

## 2022-03-03 RX ORDER — MORPHINE SULFATE 2 MG/ML
2 INJECTION, SOLUTION INTRAMUSCULAR; INTRAVENOUS
Status: CANCELLED
Start: 2022-04-01

## 2022-03-03 RX ORDER — DIPHENHYDRAMINE HCL 25 MG
25 CAPSULE ORAL
Status: CANCELLED
Start: 2022-04-01

## 2022-03-03 RX ORDER — MORPHINE SULFATE 2 MG/ML
2 INJECTION, SOLUTION INTRAMUSCULAR; INTRAVENOUS
Status: DISCONTINUED | OUTPATIENT
Start: 2022-03-03 | End: 2022-03-03 | Stop reason: HOSPADM

## 2022-03-03 RX ORDER — ONDANSETRON 8 MG/1
8 TABLET, FILM COATED ORAL
Status: CANCELLED
Start: 2022-04-01

## 2022-03-03 RX ORDER — HEPARIN SODIUM (PORCINE) LOCK FLUSH IV SOLN 100 UNIT/ML 100 UNIT/ML
5 SOLUTION INTRAVENOUS
Status: DISCONTINUED | OUTPATIENT
Start: 2022-03-03 | End: 2022-03-03 | Stop reason: HOSPADM

## 2022-03-03 RX ORDER — DIPHENHYDRAMINE HCL 25 MG
25 CAPSULE ORAL
Status: DISCONTINUED | OUTPATIENT
Start: 2022-03-03 | End: 2022-03-03

## 2022-03-03 RX ADMIN — Medication 5 ML: at 12:14

## 2022-03-03 RX ADMIN — DEXTROSE AND SODIUM CHLORIDE 1000 ML: 5; 450 INJECTION, SOLUTION INTRAVENOUS at 11:09

## 2022-03-03 RX ADMIN — MORPHINE SULFATE 2 MG: 2 INJECTION, SOLUTION INTRAMUSCULAR; INTRAVENOUS at 11:09

## 2022-03-03 RX ADMIN — MORPHINE SULFATE 2 MG: 2 INJECTION, SOLUTION INTRAMUSCULAR; INTRAVENOUS at 12:11

## 2022-03-03 RX ADMIN — MORPHINE SULFATE 2 MG: 2 INJECTION, SOLUTION INTRAMUSCULAR; INTRAVENOUS at 13:06

## 2022-03-03 ASSESSMENT — PAIN SCALES - GENERAL: PAINLEVEL: EXTREME PAIN (9)

## 2022-03-03 NOTE — TELEPHONE ENCOUNTER
Refill Request    Date of most recent appointment:  22 (Jose Rafael)  Next upcoming appointment:   3/21/22 (Perla)  Prescribing provider(s):  Patricia Mantilla, ANDREAS  Person requesting refill:  Jennifer    Medication requested:  Oxycodone 15 mg  Quantity:  45  Last fill date:  22    Notes:  Pt will be out of med in two days  Pain being treated sickle cell pain  Number of pills remainin  When will pt be out of meds: 2 days  Side effects: denies  Number of times pt takes per day: as prescribed, every four hours.  Other NA    Not  reviewed.    Pended and routed to Patricia Mantilla

## 2022-03-03 NOTE — PROGRESS NOTES
Infusion Nursing Note:  Jennifer Cervantes presents today for IVF- Pain Medication.    Patient seen by provider today: No   present during visit today: Not Applicable.    Note: Pt called in to triage requesting IVF pain meds for back, sharp shooting pain rated 9/10 x 2 days. Stated last took prn oxy and MS contin at 6 a.m. this morning without relief. Denied any fevers, chills, cough, sob, chest pain, numbness or tingling or other symptoms not typical of sickle cell pain.     -low back pain 6/10 after last dose of Morphine  -pt reporting she feels ok to discharge    Intravenous Access:  Implanted Port.    Treatment Conditions:  Not Applicable.      Post Infusion Assessment:  Patient tolerated infusion without incident.  Blood return noted pre and post infusion.  Site patent and intact, free from redness, edema or discomfort.  No evidence of extravasations.  Access discontinued per protocol.       Discharge Plan:   Prescription refills given for Oxycodone.  Discharge instructions reviewed with: Patient.  Patient and/or family verbalized understanding of discharge instructions and all questions answered.  AVS to patient via [employer].  Patient will return 3/21 for provider follow up.   Patient discharged in stable condition accompanied by: self.  Departure Mode: Ambulatory.    Patricia Coronado RN

## 2022-03-03 NOTE — TELEPHONE ENCOUNTER
Pt called in to triage requesting IVF pain meds for back, sharp shooting pain rated 9/10 x 2 days. Stated last took prn oxy and MS contin at 6 a.m. this morning without relief. Denied any fevers, chills, cough, sob, chest pain, numbness or tingling or other symptoms not typical of sickle cell pain.   Pt's last infusion was 2/28, last clinic visit 2/17/22 Patricia Mantilla, ANDREAS with follow-up on 3/21/22 with Dr. Duncan.     Patient states they do not have own ride and it will take 25 minute long to get to cancer clinic.     Pt requesting refill of oxy. Has enough for two days. Will pend up in refill encounter.    Pt qualifies for sickle cell standing order protocol.    [facility] can take pt as soon as she can get here.   [employer] paged and messaged.   [employer] confirmed ride will  Jennifer at [phone], most likely 1030.   Infusion Charge Nurse updated.  IB sent to scheduling.

## 2022-03-04 ENCOUNTER — PATIENT OUTREACH (OUTPATIENT)
Dept: ONCOLOGY | Facility: CLINIC | Age: 22
End: 2022-03-04

## 2022-03-04 ENCOUNTER — HOSPITAL ENCOUNTER (OUTPATIENT)
Dept: GENERAL RADIOLOGY | Facility: CLINIC | Age: 22
Discharge: HOME | End: 2022-03-04
Attending: INTERNAL MEDICINE | Admitting: INTERNAL MEDICINE
Payer: COMMERCIAL

## 2022-03-04 ENCOUNTER — HOSPITAL ENCOUNTER (OUTPATIENT)
Dept: NUCLEAR MEDICINE | Facility: CLINIC | Age: 22
Setting detail: NUCLEAR MEDICINE
Discharge: HOME | End: 2022-03-04
Attending: INTERNAL MEDICINE | Admitting: INTERNAL MEDICINE
Payer: COMMERCIAL

## 2022-03-04 DIAGNOSIS — D57.01 SICKLE CELL DISEASE, WITH ACUTE CHEST SYNDROME (H): ICD-10-CM

## 2022-03-04 DIAGNOSIS — I82.629 DEEP VEIN THROMBOSIS (DVT) OF UPPER EXTREMITY, UNSPECIFIED CHRONICITY, UNSPECIFIED LATERALITY, UNSPECIFIED VEIN (H): ICD-10-CM

## 2022-03-04 PROCEDURE — 71046 X-RAY EXAM CHEST 2 VIEWS: CPT

## 2022-03-04 PROCEDURE — A9567 TECHNETIUM TC-99M AEROSOL: HCPCS | Performed by: INTERNAL MEDICINE

## 2022-03-04 PROCEDURE — 250N000011 HC RX IP 250 OP 636: Performed by: INTERNAL MEDICINE

## 2022-03-04 PROCEDURE — 343N000001 HC RX 343: Performed by: INTERNAL MEDICINE

## 2022-03-04 PROCEDURE — 78582 LUNG VENTILAT&PERFUS IMAGING: CPT | Mod: 26 | Performed by: STUDENT IN AN ORGANIZED HEALTH CARE EDUCATION/TRAINING PROGRAM

## 2022-03-04 PROCEDURE — 272N000035 NM LUNG SCAN VENTILATION AND PERFUSION

## 2022-03-04 PROCEDURE — A9540 TC99M MAA: HCPCS | Performed by: INTERNAL MEDICINE

## 2022-03-04 PROCEDURE — 71046 X-RAY EXAM CHEST 2 VIEWS: CPT | Mod: 26 | Performed by: RADIOLOGY

## 2022-03-04 PROCEDURE — 78582 LUNG VENTILAT&PERFUS IMAGING: CPT

## 2022-03-04 RX ORDER — HEPARIN SODIUM (PORCINE) LOCK FLUSH IV SOLN 100 UNIT/ML 100 UNIT/ML
500 SOLUTION INTRAVENOUS ONCE
Status: COMPLETED | OUTPATIENT
Start: 2022-03-04 | End: 2022-03-04

## 2022-03-04 RX ADMIN — KIT FOR THE PREPARATION OF TECHNETIUM TC 99M ALBUMIN AGGREGATED 7.2 MILLICURIE: 2.5 INJECTION, POWDER, FOR SOLUTION INTRAVENOUS at 09:55

## 2022-03-04 RX ADMIN — KIT FOR THE PREPARATION OF TECHNETIUM TC 99M PENTETATE 65 MILLICURIE: 20 INJECTION, POWDER, LYOPHILIZED, FOR SOLUTION INTRAVENOUS; RESPIRATORY (INHALATION) at 09:25

## 2022-03-04 RX ADMIN — Medication 500 UNITS: at 09:43

## 2022-03-04 NOTE — PROGRESS NOTES
Phoned Jennifer today to provide number to call to schedule an appointment to be fitted for a leg brace    Orthotic referral :  evaluate for a right lower extremity orthotic to help with knee ext    Explained there is not an orthotic department at [facility] but there is one near by.  She can call [phone]  To schedule a fitting       Mandie  Nurse Coordinator  Dr.Florence Pierce's office  Physical Medicine and Rehabilitation

## 2022-03-06 ENCOUNTER — HOSPITAL ENCOUNTER (EMERGENCY)
Facility: CLINIC | Age: 22
Discharge: HOME | End: 2022-03-07
Attending: EMERGENCY MEDICINE | Admitting: EMERGENCY MEDICINE
Payer: COMMERCIAL

## 2022-03-06 DIAGNOSIS — D57.00 SICKLE CELL PAIN CRISIS (H): ICD-10-CM

## 2022-03-06 LAB
ABO/RH(D): NORMAL
ALBUMIN SERPL-MCNC: 4 G/DL (ref 3.4–5)
ALP SERPL-CCNC: 86 U/L (ref 40–150)
ALT SERPL W P-5'-P-CCNC: 54 U/L (ref 0–50)
ANION GAP SERPL CALCULATED.3IONS-SCNC: 11 MMOL/L (ref 3–14)
ANTIBODY SCREEN: NEGATIVE
AST SERPL W P-5'-P-CCNC: 76 U/L (ref 0–45)
BILIRUB SERPL-MCNC: 3.2 MG/DL (ref 0.2–1.3)
BUN SERPL-MCNC: 5 MG/DL (ref 7–30)
CALCIUM SERPL-MCNC: 8.9 MG/DL (ref 8.5–10.1)
CHLORIDE BLD-SCNC: 109 MMOL/L (ref 94–109)
CO2 SERPL-SCNC: 19 MMOL/L (ref 20–32)
CREAT SERPL-MCNC: 0.58 MG/DL (ref 0.52–1.04)
GFR SERPL CREATININE-BSD FRML MDRD: >90 ML/MIN/1.73M2
GLUCOSE BLD-MCNC: 102 MG/DL (ref 70–99)
HCG SERPL QL: NEGATIVE
POTASSIUM BLD-SCNC: 3.3 MMOL/L (ref 3.4–5.3)
PROT SERPL-MCNC: 7.8 G/DL (ref 6.8–8.8)
RETICS # AUTO: 0.62 10E6/UL (ref 0.03–0.1)
RETICS/RBC NFR AUTO: 24.6 % (ref 0.5–2)
SODIUM SERPL-SCNC: 139 MMOL/L (ref 133–144)
SPECIMEN EXPIRATION DATE: NORMAL

## 2022-03-06 PROCEDURE — 85014 HEMATOCRIT: CPT | Performed by: EMERGENCY MEDICINE

## 2022-03-06 PROCEDURE — 250N000013 HC RX MED GY IP 250 OP 250 PS 637: Performed by: EMERGENCY MEDICINE

## 2022-03-06 PROCEDURE — 85045 AUTOMATED RETICULOCYTE COUNT: CPT | Performed by: EMERGENCY MEDICINE

## 2022-03-06 PROCEDURE — 250N000011 HC RX IP 250 OP 636: Performed by: EMERGENCY MEDICINE

## 2022-03-06 PROCEDURE — 99285 EMERGENCY DEPT VISIT HI MDM: CPT | Mod: 25 | Performed by: EMERGENCY MEDICINE

## 2022-03-06 PROCEDURE — 96361 HYDRATE IV INFUSION ADD-ON: CPT | Performed by: EMERGENCY MEDICINE

## 2022-03-06 PROCEDURE — 250N000009 HC RX 250: Performed by: EMERGENCY MEDICINE

## 2022-03-06 PROCEDURE — 86901 BLOOD TYPING SEROLOGIC RH(D): CPT | Performed by: EMERGENCY MEDICINE

## 2022-03-06 PROCEDURE — 84703 CHORIONIC GONADOTROPIN ASSAY: CPT | Performed by: EMERGENCY MEDICINE

## 2022-03-06 PROCEDURE — 80053 COMPREHEN METABOLIC PANEL: CPT | Performed by: EMERGENCY MEDICINE

## 2022-03-06 PROCEDURE — 86850 RBC ANTIBODY SCREEN: CPT | Performed by: EMERGENCY MEDICINE

## 2022-03-06 PROCEDURE — 36415 COLL VENOUS BLD VENIPUNCTURE: CPT | Performed by: EMERGENCY MEDICINE

## 2022-03-06 PROCEDURE — 96375 TX/PRO/DX INJ NEW DRUG ADDON: CPT | Performed by: EMERGENCY MEDICINE

## 2022-03-06 PROCEDURE — 258N000003 HC RX IP 258 OP 636: Performed by: EMERGENCY MEDICINE

## 2022-03-06 PROCEDURE — 85610 PROTHROMBIN TIME: CPT | Performed by: EMERGENCY MEDICINE

## 2022-03-06 PROCEDURE — 99285 EMERGENCY DEPT VISIT HI MDM: CPT | Performed by: EMERGENCY MEDICINE

## 2022-03-06 PROCEDURE — 96374 THER/PROPH/DIAG INJ IV PUSH: CPT | Performed by: EMERGENCY MEDICINE

## 2022-03-06 RX ORDER — KETOROLAC TROMETHAMINE 15 MG/ML
15 INJECTION, SOLUTION INTRAMUSCULAR; INTRAVENOUS ONCE
Status: COMPLETED | OUTPATIENT
Start: 2022-03-06 | End: 2022-03-06

## 2022-03-06 RX ADMIN — OXYCODONE HYDROCHLORIDE 15 MG: 5 TABLET ORAL at 23:49

## 2022-03-06 RX ADMIN — SODIUM CHLORIDE, POTASSIUM CHLORIDE, SODIUM LACTATE AND CALCIUM CHLORIDE 1000 ML: 600; 310; 30; 20 INJECTION, SOLUTION INTRAVENOUS at 23:48

## 2022-03-06 RX ADMIN — Medication 4 MG: at 23:48

## 2022-03-06 RX ADMIN — KETOROLAC TROMETHAMINE 15 MG: 15 INJECTION, SOLUTION INTRAMUSCULAR; INTRAVENOUS at 23:49

## 2022-03-07 VITALS
RESPIRATION RATE: 16 BRPM | WEIGHT: 171 LBS | DIASTOLIC BLOOD PRESSURE: 70 MMHG | BODY MASS INDEX: 29.35 KG/M2 | TEMPERATURE: 98.1 F | SYSTOLIC BLOOD PRESSURE: 122 MMHG | OXYGEN SATURATION: 94 % | HEART RATE: 100 BPM

## 2022-03-07 LAB
BASOPHILS # BLD MANUAL: 0.2 10E3/UL (ref 0–0.2)
BASOPHILS NFR BLD MANUAL: 2 %
BURR CELLS BLD QL SMEAR: ABNORMAL
ELLIPTOCYTES BLD QL SMEAR: SLIGHT
EOSINOPHIL # BLD MANUAL: 0.6 10E3/UL (ref 0–0.7)
EOSINOPHIL NFR BLD MANUAL: 5 %
ERYTHROCYTE [DISTWIDTH] IN BLOOD BY AUTOMATED COUNT: 28.3 % (ref 10–15)
HCT VFR BLD AUTO: 23.4 % (ref 35–47)
HGB BLD-MCNC: 8.2 G/DL (ref 11.7–15.7)
INR PPP: 1.35 (ref 0.85–1.15)
LYMPHOCYTES # BLD MANUAL: 8.6 10E3/UL (ref 0.8–5.3)
LYMPHOCYTES NFR BLD MANUAL: 73 %
MCH RBC QN AUTO: 32.4 PG (ref 26.5–33)
MCHC RBC AUTO-ENTMCNC: 35 G/DL (ref 31.5–36.5)
MCV RBC AUTO: 93 FL (ref 78–100)
MONOCYTES # BLD MANUAL: 0.6 10E3/UL (ref 0–1.3)
MONOCYTES NFR BLD MANUAL: 5 %
NEUTROPHILS # BLD MANUAL: 1.8 10E3/UL (ref 1.6–8.3)
NEUTROPHILS NFR BLD MANUAL: 15 %
NRBC # BLD AUTO: 7.9 10E3/UL
NRBC BLD MANUAL-RTO: 67 %
PLAT MORPH BLD: ABNORMAL
PLATELET # BLD AUTO: 346 10E3/UL (ref 150–450)
POLYCHROMASIA BLD QL SMEAR: ABNORMAL
RBC # BLD AUTO: 2.53 10E6/UL (ref 3.8–5.2)
RBC MORPH BLD: ABNORMAL
SICKLE CELLS BLD QL SMEAR: ABNORMAL
TARGETS BLD QL SMEAR: ABNORMAL
WBC # BLD AUTO: 11.8 10E3/UL (ref 4–11)

## 2022-03-07 PROCEDURE — 250N000013 HC RX MED GY IP 250 OP 250 PS 637: Performed by: EMERGENCY MEDICINE

## 2022-03-07 RX ORDER — HEPARIN SODIUM (PORCINE) LOCK FLUSH IV SOLN 100 UNIT/ML 100 UNIT/ML
SOLUTION INTRAVENOUS
Status: DISCONTINUED
Start: 2022-03-07 | End: 2022-03-07 | Stop reason: HOSPADM

## 2022-03-07 RX ADMIN — OXYCODONE HYDROCHLORIDE 15 MG: 5 TABLET ORAL at 02:21

## 2022-03-07 RX ADMIN — OXYCODONE HYDROCHLORIDE 15 MG: 5 TABLET ORAL at 01:12

## 2022-03-07 ASSESSMENT — ENCOUNTER SYMPTOMS
COUGH: 0
NAUSEA: 0
FEVER: 0
HEADACHES: 0
CONFUSION: 0
SHORTNESS OF BREATH: 0
EYE REDNESS: 0
DIFFICULTY URINATING: 0
ABDOMINAL PAIN: 0
BACK PAIN: 0
VOMITING: 0
SEIZURES: 0
ARTHRALGIAS: 1
DIARRHEA: 0

## 2022-03-07 NOTE — ED PROVIDER NOTES
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  She reports onset of the pain 2 days ago, reports total body pain but denies chest pain.  She denies any shortness of breath.  No nausea or vomiting.  Reports she has been compliant with her medication.  Reports she has tried her home medication without success.  She reports that prior to Friday she was in her usual state of health, has not been ill recently.  No nausea or vomiting today either.  She reports that she does not menstruate, on Depo-Provera.    Exam: XR CHEST 2 VW, 3/4/2022 10:43 AM  Comparison: 1/29/2022                                                              Impression: No focal airspace disease.    Past Medical History  Past Medical History:   Diagnosis Date     Anxiety      Bleeding disorder (H)      Blood clotting disorder (H)      Cerebral infarction (H) 2015     Cognitive developmental delay     low IQ. Please recognize when managing pain and planning with her     Depressive disorder      Hemiplegia and hemiparesis following cerebral infarction affecting right dominant side (H)     right hand contractures     Iron overload due to repeated red blood cell transfusions      Migraines      Multiple subsegmental pulmonary emboli without acute cor pulmonale (H) 02/01/2021     Oppositional defiant behavior      Superficial venous thrombosis of arm, right 03/25/2021     Uncomplicated asthma      Past Surgical History:   Procedure Laterality Date     AS INSERT TUNNELED CV 2 CATH W/O PORT/PUMP       CHOLECYSTECTOMY       CV RIGHT HEART CATH MEASUREMENTS  RECORDED N/A 11/18/2021    Procedure: Right Heart Cath;  Surgeon: Jackson Stauffer MD;  Location:  HEART CARDIAC CATH LAB     INSERT PORT VASCULAR ACCESS Left 4/21/2021    Procedure: INSERTION, VASCULAR ACCESS PORT (NOT SURE ON SIDE UNTIL REMOVAL);  Surgeon: Rajan More MD;  Location: [facility] OR     IR CHEST PORT PLACEMENT > 5 YRS OF AGE  4/21/2021     IR CVC NON TUNNEL LINE REMOVAL  5/6/2021     IR CVC NON TUNNEL PLACEMENT  04/07/2020     IR CVC NON TUNNEL PLACEMENT  4/30/2021     IR PORT REMOVAL LEFT  4/21/2021     REMOVE PORT VASCULAR ACCESS Left 4/21/2021    Procedure: REMOVAL, VASCULAR ACCESS PORT LEFT;  Surgeon: Rajan More MD;  Location: [facility] OR     REPAIR TENDON ELBOW Right 10/02/2019    Procedure: Right Elbow Flexor Lengthening, Flexor Pronator Slide Of Wrist and Finger, Thumb Adductor Lengthening;  Surgeon: Anai Franco MD;  Location: [facility]     TONSILLECTOMY Bilateral 10/02/2019    Procedure: Bilateral Tonsillectomy;  Surgeon: Farhana Guy MD;  Location: [facility] OR     ZZC BREAST REDUCTION (INCLUDES LIPO) TIER 3 Bilateral 04/23/2019     acetaminophen (TYLENOL) 325 MG tablet  albuterol (PROAIR HFA/PROVENTIL HFA/VENTOLIN HFA) 108 (90 Base) MCG/ACT inhaler  albuterol (PROVENTIL) (2.5 MG/3ML) 0.083% neb solution  aspirin (ASA) 81 MG chewable tablet  budesonide-formoterol (SYMBICORT) 160-4.5 MCG/ACT Inhaler  deferasirox (JADENU) 360 MG tablet  diphenhydrAMINE (BENADRYL) 25 MG capsule  EPINEPHrine (ANY BX GENERIC EQUIV) 0.3 MG/0.3ML injection 2-pack  Hydroxyurea 1000 MG TABS  lidocaine-prilocaine (EMLA) 2.5-2.5 % external cream  medroxyPROGESTERone (DEPO-PROVERA) 150 MG/ML IM injection  morphine (MS CONTIN) 15 MG CR tablet  naloxone (NARCAN) 4 MG/0.1ML nasal spray  ondansetron (ZOFRAN) 8 MG tablet  oxyCODONE IR (ROXICODONE) 15 MG tablet      Allergies   Allergen Reactions     Contrast Dye      Hives and breathing issues     Fish-Derived Products Hives     Seafood Hives     Diagnostic  X-Ray Materials      Gadolinium      Family History  Family History   Problem Relation Age of Onset     Sickle Cell Trait Mother      Hypertension Mother      Asthma Mother      Sickle Cell Trait Father      Social History   Social History     Tobacco Use     Smoking status: Never Smoker     Smokeless tobacco: Never Used   Substance Use Topics     Alcohol use: Not Currently     Alcohol/week: 0.0 standard drinks     Drug use: Never      Past medical history, past surgical history, medications, allergies, family history, and social history were reviewed with the patient. No additional pertinent items.       Review of Systems   Constitutional: Negative for fever.   HENT: Negative for congestion.    Eyes: Negative for redness.   Respiratory: Negative for cough and shortness of breath.    Cardiovascular: Negative for chest pain.   Gastrointestinal: Negative for abdominal pain, diarrhea, nausea and vomiting.   Genitourinary: Negative for difficulty urinating.   Musculoskeletal: Positive for arthralgias. Negative for back pain.   Skin: Negative for rash.   Neurological: Negative for seizures and headaches.   Psychiatric/Behavioral: Negative for confusion.     A complete review of systems was performed with pertinent positives and negatives noted in the HPI, and all other systems negative.    Physical Exam   BP: 138/81  Pulse: 105  Temp: 98.1  F (36.7  C)  Resp: 16  Weight: 77.6 kg (171 lb)  SpO2: 92 %  Physical Exam  Constitutional:       General: She is awake. She is not in acute distress.     Appearance: Normal appearance. She is well-developed. She is not ill-appearing or toxic-appearing.   HENT:      Head: Atraumatic.      Mouth/Throat:      Pharynx: No oropharyngeal exudate.   Eyes:      General: No scleral icterus.     Pupils: Pupils are equal, round, and reactive to light.   Cardiovascular:      Rate and Rhythm: Normal rate and regular rhythm.      Heart sounds: Normal heart sounds, S1 normal and S2 normal.    Pulmonary:      Effort: No respiratory distress.      Breath sounds: Normal breath sounds.   Chest:       Abdominal:      General: Bowel sounds are normal.      Palpations: Abdomen is soft.      Tenderness: There is no abdominal tenderness.   Musculoskeletal:         General: No tenderness.   Skin:     General: Skin is warm.      Findings: No rash.   Neurological:      Mental Status: She is alert and oriented to person, place, and time.   Psychiatric:         Attention and Perception: Attention normal.         Mood and Affect: Mood normal.         Speech: Speech normal.         Behavior: Behavior normal. Behavior is cooperative.         ED Course      Procedures                   Results for orders placed or performed during the hospital encounter of 03/06/22   Comprehensive metabolic panel     Status: Abnormal   Result Value Ref Range    Sodium 139 133 - 144 mmol/L    Potassium 3.3 (L) 3.4 - 5.3 mmol/L    Chloride 109 94 - 109 mmol/L    Carbon Dioxide (CO2) 19 (L) 20 - 32 mmol/L    Anion Gap 11 3 - 14 mmol/L    Urea Nitrogen 5 (L) 7 - 30 mg/dL    Creatinine 0.58 0.52 - 1.04 mg/dL    Calcium 8.9 8.5 - 10.1 mg/dL    Glucose 102 (H) 70 - 99 mg/dL    Alkaline Phosphatase 86 40 - 150 U/L    AST 76 (H) 0 - 45 U/L    ALT 54 (H) 0 - 50 U/L    Protein Total 7.8 6.8 - 8.8 g/dL    Albumin 4.0 3.4 - 5.0 g/dL    Bilirubin Total 3.2 (H) 0.2 - 1.3 mg/dL    GFR Estimate >90 >60 mL/min/1.73m2   Reticulocyte count     Status: Abnormal   Result Value Ref Range    % Reticulocyte 24.6 (H) 0.5 - 2.0 %    Absolute Reticulocyte 0.621 (H) 0.025 - 0.095 10e6/uL   HCG qualitative Blood     Status: Normal   Result Value Ref Range    hCG Serum Qualitative Negative Negative   CBC with platelets and differential     Status: Abnormal   Result Value Ref Range    WBC Count 11.8 (H) 4.0 - 11.0 10e3/uL    RBC Count 2.53 (L) 3.80 - 5.20 10e6/uL    Hemoglobin 8.2 (L) 11.7 - 15.7 g/dL    Hematocrit 23.4 (L) 35.0 - 47.0 %    MCV 93 78 - 100 fL    MCH  32.4 26.5 - 33.0 pg    MCHC 35.0 31.5 - 36.5 g/dL    RDW 28.3 (H) 10.0 - 15.0 %    Platelet Count 346 150 - 450 10e3/uL   INR     Status: Abnormal   Result Value Ref Range    INR 1.35 (H) 0.85 - 1.15   Manual Differential     Status: Abnormal   Result Value Ref Range    % Neutrophils 15 %    % Lymphocytes 73 %    % Monocytes 5 %    % Eosinophils 5 %    % Basophils 2 %    NRBCs per 100 WBC 67 (H) <=0 %    Absolute Neutrophils 1.8 1.6 - 8.3 10e3/uL    Absolute Lymphocytes 8.6 (H) 0.8 - 5.3 10e3/uL    Absolute Monocytes 0.6 0.0 - 1.3 10e3/uL    Absolute Eosinophils 0.6 0.0 - 0.7 10e3/uL    Absolute Basophils 0.2 0.0 - 0.2 10e3/uL    Absolute NRBCs 7.9 (H) <=0.0 10e3/uL    RBC Morphology Confirmed RBC Indices     Platelet Assessment  Automated Count Confirmed. Platelet morphology is normal.     Automated Count Confirmed. Platelet morphology is normal.    [place] Cells Moderate (A) None Seen    Elliptocytes Slight (A) None Seen    Polychromasia Moderate (A) None Seen    Sickle Cells Marked (A) None Seen    Target Cells Moderate (A) None Seen   Adult Type and Screen     Status: None   Result Value Ref Range    ABO/RH(D) O POS     Antibody Screen Negative Negative    SPECIMEN EXPIRATION DATE [id number]    CBC with platelets differential     Status: Abnormal    Narrative    The following orders were created for panel order CBC with platelets differential.  Procedure                               Abnormality         Status                     ---------                               -----------         ------                     CBC with platelets and d...[[id number]]  Abnormal            Final result               Manual Differential[[id number]]          Abnormal            Final result                 Please view results for these tests on the individual orders.   ABO/Rh type and screen     Status: None    Narrative    The following orders were created for panel order ABO/Rh type and screen.  Procedure                                Abnormality         Status                     ---------                               -----------         ------                     Adult Type and Screen[[id number]]                            Final result                 Please view results for these tests on the individual orders.     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  History and exam are consistent with sickle cell pain crisis flare.  We will send screening labs to make sure she is not aplastic crisis.  We will treat according to her care plan which involves oral oxycodone, IV ketamine, IV Toradol.    I have reviewed the nursing notes. I have reviewed the findings, diagnosis, plan and need for follow up with the patient.    Reassessed multiple times and is having improvement with each dose of medication.  Will give 1 last dose of oxycodone and patient feels she is okay to discharge to home.  Recommend outpatient follow-up, return precautions given    New Prescriptions    No medications on file       Final diagnoses:   Sickle cell pain crisis (H)       --  Jordan Slade MD PhD  formerly [facility] EMERGENCY DEPARTMENT  3/6/2022     Jered Slade MD  03/07/22 0219

## 2022-03-07 NOTE — ED NOTES
Pt medically cleared for discharge.   Medical cab called by patient for ride home. Pt ambulatory with steady gait leaving the ED.

## 2022-03-09 ENCOUNTER — HOME INFUSION (PRE-WILLOW HOME INFUSION) (OUTPATIENT)
Dept: PHARMACY | Facility: CLINIC | Age: 22
End: 2022-03-09

## 2022-03-10 ENCOUNTER — VIRTUAL VISIT (OUTPATIENT)
Dept: CARDIOLOGY | Facility: CLINIC | Age: 22
End: 2022-03-10
Attending: INTERNAL MEDICINE
Payer: COMMERCIAL

## 2022-03-10 DIAGNOSIS — I27.20 PULMONARY HYPERTENSION (H): Primary | ICD-10-CM

## 2022-03-10 DIAGNOSIS — D57.01 SICKLE CELL DISEASE, WITH ACUTE CHEST SYNDROME (H): ICD-10-CM

## 2022-03-10 PROCEDURE — 99443 PR PHYSICIAN TELEPHONE EVALUATION 21-30 MIN: CPT | Mod: TEL | Performed by: INTERNAL MEDICINE

## 2022-03-10 NOTE — LETTER
3/10/2022      RE: Himanshu Al  [address]  [place] [address]       Dear Colleague,    Thank you for the opportunity to participate in the care of your patient, Himanshu Al, at the [facility] [place] [facility]. Please see a copy of my visit note below.    Telephone visit x 30 minutes    The patient is clinically stable from our point of view.  PFO demonstrated.  Patient is not a candidate for anticoagulation secondary to compliance and risk of bleeding.    Plan: RTC 4 months    The patient returns for follow-up of SCA and possible PH.    The patient returns for follow-up of SCA/PH.  There is no interim history of chest pain, tightness, paroxysmal nocturnal dyspnea, orthopnea, peripheral edema, palpitation, pre-syncope, syncope, device discharge.  Exercise tolerance is stable.  The patient is attempting to exercise regularly and following a sodium restricted, calorically appropriate diet.  Medications are reviewed and the patient is taking medications as prescribed.  The patient is generally sleeping well.     Current Outpatient Medications   Medication     acetaminophen (TYLENOL) 325 MG tablet     albuterol (PROAIR HFA/PROVENTIL HFA/VENTOLIN HFA) 108 (90 Base) MCG/ACT inhaler     albuterol (PROVENTIL) (2.5 MG/3ML) 0.083% neb solution     aspirin (ASA) 81 MG chewable tablet     budesonide-formoterol (SYMBICORT) 160-4.5 MCG/ACT Inhaler     deferasirox (JADENU) 360 MG tablet     diphenhydrAMINE (BENADRYL) 25 MG capsule     Hydroxyurea 1000 MG TABS     morphine (MS CONTIN) 15 MG CR tablet     ondansetron (ZOFRAN) 8 MG tablet     EPINEPHrine (ANY BX GENERIC EQUIV) 0.3 MG/0.3ML injection 2-pack     medroxyPROGESTERone (DEPO-PROVERA) 150 MG/ML IM injection     oxyCODONE IR (ROXICODONE) 15 MG tablet     No current facility-administered medications for this visit.         Results for HIMANSHU AL (MRN [id number]) as of 3/19/2022 19:38   Ref.  Range 3/6/2022 23:22 3/6/2022 23:56   Sodium Latest Ref Range: 133 - 144 mmol/L 139    Potassium Latest Ref Range: 3.4 - 5.3 mmol/L 3.3 (L)    Chloride Latest Ref Range: 94 - 109 mmol/L 109    Carbon Dioxide Latest Ref Range: 20 - 32 mmol/L 19 (L)    Urea Nitrogen Latest Ref Range: 7 - 30 mg/dL 5 (L)    Creatinine Latest Ref Range: 0.52 - 1.04 mg/dL 0.58    GFR Estimate Latest Ref Range: >60 mL/min/1.73m2 >90    Calcium Latest Ref Range: 8.5 - 10.1 mg/dL 8.9    Anion Gap Latest Ref Range: 3 - 14 mmol/L 11    Albumin Latest Ref Range: 3.4 - 5.0 g/dL 4.0    Protein Total Latest Ref Range: 6.8 - 8.8 g/dL 7.8    Bilirubin Total Latest Ref Range: 0.2 - 1.3 mg/dL 3.2 (H)    Alkaline Phosphatase Latest Ref Range: 40 - 150 U/L 86    ALT Latest Ref Range: 0 - 50 U/L 54 (H)    AST Latest Ref Range: 0 - 45 U/L 76 (H)    HCG Qualitative Serum Latest Ref Range: Negative  Negative    Glucose Latest Ref Range: 70 - 99 mg/dL 102 (H)    WBC Latest Ref Range: 4.0 - 11.0 10e3/uL 11.8 (H)    Hemoglobin Latest Ref Range: 11.7 - 15.7 g/dL 8.2 (L)    Hematocrit Latest Ref Range: 35.0 - 47.0 % 23.4 (L)    Platelet Count Latest Ref Range: 150 - 450 10e3/uL 346    RBC Count Latest Ref Range: 3.80 - 5.20 10e6/uL 2.53 (L)    MCV Latest Ref Range: 78 - 100 fL 93    MCH Latest Ref Range: 26.5 - 33.0 pg 32.4    MCHC Latest Ref Range: 31.5 - 36.5 g/dL 35.0    RDW Latest Ref Range: 10.0 - 15.0 % 28.3 (H)    % Neutrophils Latest Units: % 15    % Lymphocytes Latest Units: % 73    % Monocytes Latest Units: % 5    % Eosinophils Latest Units: % 5    % Basophils Latest Units: % 2    Absolute Basophils Latest Ref Range: 0.0 - 0.2 10e3/uL 0.2    NRBC/W Latest Ref Range: <=0 % 67 (H)    Absolute Neutrophil Latest Ref Range: 1.6 - 8.3 10e3/uL 1.8    Absolute Lymphocytes Latest Ref Range: 0.8 - 5.3 10e3/uL 8.6 (H)    Absolute Monocytes Latest Ref Range: 0.0 - 1.3 10e3/uL 0.6    Absolute Eosinophils Latest Ref Range: 0.0 - 0.7 10e3/uL 0.6    Absolute NRBCs  Latest Ref Range: <=0.0 10e3/uL 7.9 (H)    RBC Morphology Unknown Confirmed RBC Indices    Platelet Morphology Latest Ref Range: Automated Count Confirmed. Platelet morphology is normal.  Automated Count Confirmed. Platelet morphology is normal.    Polychromasia Latest Ref Range: None Seen  Moderate (A)    Sickle Cells Latest Ref Range: None Seen  Marked (A)    Target Cells Latest Ref Range: None Seen  Moderate (A)    Elliptocytes Latest Ref Range: None Seen  Slight (A)    [place] Cells Latest Ref Range: None Seen  Moderate (A)    % Retic Latest Ref Range: 0.5 - 2.0 % 24.6 (H)    Absolute Retic Latest Ref Range: 0.025 - 0.095 10e6/uL 0.621 (H)    INR Latest Ref Range: 0.85 - 1.15   1.35 (H)   ABO/Rh(D) Unknown O POS    Antibody Screen Latest Ref Range: Negative  Negative    SPECIMEN EXPIRATION DATE Unknown [id number]           Name: HIMANSHU AL  MRN: [id number]  : 2000  Study Date: 2022 11:08 AM  Age: 21 yrs  Gender: Female  Patient Location: [facility]  Reason For Study: Deep vein thrombosis (DVT) of upper extremity, unspecified  chron  Ordering Physician: CASSIE NOEL  Referring Physician: LESA THOMPSON  Performed By: Susi Ramos RDCS     BSA: 1.8 m2  Height: 64 in  Weight: 171 lb  HR: 101  BP: 133/76 mmHg  ______________________________________________________________________________  Procedure  Bubble Echocardiogram with two-dimensional, color and spectral Doppler  performed.  ______________________________________________________________________________  Interpretation Summary  Global and regional left ventricular function is normal with an EF of 55-60%.  Right ventricular function, chamber size, wall motion, and thickness are  normal.  The patent foramen ovale was demonstrated by agitated saline bubble study .  Pulmonary artery systolic pressure cannot be assessed.  The inferior vena cava is normal.  No pericardial effusion is present.  No significant changes  noted.  ______________________________________________________________________________  Left Ventricle  Global and regional left ventricular function is normal with an EF of 55-60%.  Left ventricular wall thickness is normal. Left ventricular size is normal.  Left ventricular diastolic function is indeterminate. No regional wall motion  abnormalities are seen.     Right Ventricle  Right ventricular function, chamber size, wall motion, and thickness are  normal.     Atria  Both atria appear normal. The patent foramen ovale was demonstrated by  agitated saline bubble study .     Mitral Valve  The mitral valve is normal. Trace mitral insufficiency is present.     Aortic Valve  Aortic valve is normal in structure and function.     Tricuspid Valve  The tricuspid valve is normal. Trace tricuspid insufficiency is present.  Pulmonary artery systolic pressure cannot be assessed.     Pulmonic Valve  The pulmonic valve is normal. Trace pulmonic insufficiency is present.     Vessels  The thoracic aorta is normal. The pulmonary artery and bifurcation cannot be  assessed. The inferior vena cava is normal.     Pericardium  No pericardial effusion is present.     Compared to Previous Study  No significant changes noted.  ______________________________________________________________________________  MMode/2D Measurements & Calculations  IVSd: 1.0 cm     LVIDd: 5.2 cm  LVIDs: 3.4 cm  LVPWd: 0.96 cm  FS: 34.6 %  LV mass(C)d: 191.4 grams  LV mass(C)dI: 104.6 grams/m2  Ao root diam: 2.9 cm  asc Aorta Diam: 2.6 cm  LVOT diam: 2.1 cm  LVOT area: 3.5 cm2  LA Volume (BP): 35.3 ml  LA Volume Index (BP): 19.3 ml/m2  RWT: 0.37     Doppler Measurements & Calculations  PA acc time: 0.15 sec      Please do not hesitate to contact me if you have any questions/concerns.     Sincerely,     Gaetano Mccormick MD

## 2022-03-10 NOTE — NURSING NOTE
Chief Complaint   Patient presents with     Follow Up     follow up for PH, and review results. Reviewed medications with pt, no changes. NO pt reported vitals today.      Olga Leonard, Visit Facilitator/MA.

## 2022-03-10 NOTE — PROGRESS NOTES
Telephone visit x 30 minutes    The patient is clinically stable from our point of view.  PFO demonstrated.  Patient is not a candidate for anticoagulation secondary to compliance and risk of bleeding.    Plan: RTC 4 months    The patient returns for follow-up of SCA and possible PH.    The patient returns for follow-up of SCA/PH.  There is no interim history of chest pain, tightness, paroxysmal nocturnal dyspnea, orthopnea, peripheral edema, palpitation, pre-syncope, syncope, device discharge.  Exercise tolerance is stable.  The patient is attempting to exercise regularly and following a sodium restricted, calorically appropriate diet.  Medications are reviewed and the patient is taking medications as prescribed.  The patient is generally sleeping well.     Current Outpatient Medications   Medication     acetaminophen (TYLENOL) 325 MG tablet     albuterol (PROAIR HFA/PROVENTIL HFA/VENTOLIN HFA) 108 (90 Base) MCG/ACT inhaler     albuterol (PROVENTIL) (2.5 MG/3ML) 0.083% neb solution     aspirin (ASA) 81 MG chewable tablet     budesonide-formoterol (SYMBICORT) 160-4.5 MCG/ACT Inhaler     deferasirox (JADENU) 360 MG tablet     diphenhydrAMINE (BENADRYL) 25 MG capsule     Hydroxyurea 1000 MG TABS     morphine (MS CONTIN) 15 MG CR tablet     ondansetron (ZOFRAN) 8 MG tablet     EPINEPHrine (ANY BX GENERIC EQUIV) 0.3 MG/0.3ML injection 2-pack     medroxyPROGESTERone (DEPO-PROVERA) 150 MG/ML IM injection     oxyCODONE IR (ROXICODONE) 15 MG tablet     No current facility-administered medications for this visit.         Results for HIMANSHU AL (MRN [id number]) as of 3/19/2022 19:38   Ref. Range 3/6/2022 23:22 3/6/2022 23:56   Sodium Latest Ref Range: 133 - 144 mmol/L 139    Potassium Latest Ref Range: 3.4 - 5.3 mmol/L 3.3 (L)    Chloride Latest Ref Range: 94 - 109 mmol/L 109    Carbon Dioxide Latest Ref Range: 20 - 32 mmol/L 19 (L)    Urea Nitrogen Latest Ref Range: 7 - 30 mg/dL 5 (L)    Creatinine Latest Ref Range: 0.52  - 1.04 mg/dL 0.58    GFR Estimate Latest Ref Range: >60 mL/min/1.73m2 >90    Calcium Latest Ref Range: 8.5 - 10.1 mg/dL 8.9    Anion Gap Latest Ref Range: 3 - 14 mmol/L 11    Albumin Latest Ref Range: 3.4 - 5.0 g/dL 4.0    Protein Total Latest Ref Range: 6.8 - 8.8 g/dL 7.8    Bilirubin Total Latest Ref Range: 0.2 - 1.3 mg/dL 3.2 (H)    Alkaline Phosphatase Latest Ref Range: 40 - 150 U/L 86    ALT Latest Ref Range: 0 - 50 U/L 54 (H)    AST Latest Ref Range: 0 - 45 U/L 76 (H)    HCG Qualitative Serum Latest Ref Range: Negative  Negative    Glucose Latest Ref Range: 70 - 99 mg/dL 102 (H)    WBC Latest Ref Range: 4.0 - 11.0 10e3/uL 11.8 (H)    Hemoglobin Latest Ref Range: 11.7 - 15.7 g/dL 8.2 (L)    Hematocrit Latest Ref Range: 35.0 - 47.0 % 23.4 (L)    Platelet Count Latest Ref Range: 150 - 450 10e3/uL 346    RBC Count Latest Ref Range: 3.80 - 5.20 10e6/uL 2.53 (L)    MCV Latest Ref Range: 78 - 100 fL 93    MCH Latest Ref Range: 26.5 - 33.0 pg 32.4    MCHC Latest Ref Range: 31.5 - 36.5 g/dL 35.0    RDW Latest Ref Range: 10.0 - 15.0 % 28.3 (H)    % Neutrophils Latest Units: % 15    % Lymphocytes Latest Units: % 73    % Monocytes Latest Units: % 5    % Eosinophils Latest Units: % 5    % Basophils Latest Units: % 2    Absolute Basophils Latest Ref Range: 0.0 - 0.2 10e3/uL 0.2    NRBC/W Latest Ref Range: <=0 % 67 (H)    Absolute Neutrophil Latest Ref Range: 1.6 - 8.3 10e3/uL 1.8    Absolute Lymphocytes Latest Ref Range: 0.8 - 5.3 10e3/uL 8.6 (H)    Absolute Monocytes Latest Ref Range: 0.0 - 1.3 10e3/uL 0.6    Absolute Eosinophils Latest Ref Range: 0.0 - 0.7 10e3/uL 0.6    Absolute NRBCs Latest Ref Range: <=0.0 10e3/uL 7.9 (H)    RBC Morphology Unknown Confirmed RBC Indices    Platelet Morphology Latest Ref Range: Automated Count Confirmed. Platelet morphology is normal.  Automated Count Confirmed. Platelet morphology is normal.    Polychromasia Latest Ref Range: None Seen  Moderate (A)    Sickle Cells Latest Ref Range:  None Seen  Marked (A)    Target Cells Latest Ref Range: None Seen  Moderate (A)    Elliptocytes Latest Ref Range: None Seen  Slight (A)    [place] Cells Latest Ref Range: None Seen  Moderate (A)    % Retic Latest Ref Range: 0.5 - 2.0 % 24.6 (H)    Absolute Retic Latest Ref Range: 0.025 - 0.095 10e6/uL 0.621 (H)    INR Latest Ref Range: 0.85 - 1.15   1.35 (H)   ABO/Rh(D) Unknown O POS    Antibody Screen Latest Ref Range: Negative  Negative    SPECIMEN EXPIRATION DATE Unknown [id number]           Name: HIMANSHU AL  MRN: [id number]  : 2000  Study Date: 2022 11:08 AM  Age: 21 yrs  Gender: Female  Patient [facility]: [facility]  Reason For Study: Deep vein thrombosis (DVT) of upper extremity, unspecified  chron  Ordering Physician: CASSIE NOEL  Referring Physician: LESA THOMPSON  Performed By: Susi Ramos RDCS     BSA: 1.8 m2  Height: 64 in  Weight: 171 lb  HR: 101  BP: 133/76 mmHg  ______________________________________________________________________________  Procedure  Bubble Echocardiogram with two-dimensional, color and spectral Doppler  performed.  ______________________________________________________________________________  Interpretation Summary  Global and regional left ventricular function is normal with an EF of 55-60%.  Right ventricular function, chamber size, wall motion, and thickness are  normal.  The patent foramen ovale was demonstrated by agitated saline bubble study .  Pulmonary artery systolic pressure cannot be assessed.  The inferior vena cava is normal.  No pericardial effusion is present.  No significant changes noted.  ______________________________________________________________________________  Left Ventricle  Global and regional left ventricular function is normal with an EF of 55-60%.  Left ventricular wall thickness is normal. Left ventricular size is normal.  Left ventricular diastolic function is indeterminate. No regional wall  motion  abnormalities are seen.     Right Ventricle  Right ventricular function, chamber size, wall motion, and thickness are  normal.     Atria  Both atria appear normal. The patent foramen ovale was demonstrated by  agitated saline bubble study .     Mitral Valve  The mitral valve is normal. Trace mitral insufficiency is present.     Aortic Valve  Aortic valve is normal in structure and function.     Tricuspid Valve  The tricuspid valve is normal. Trace tricuspid insufficiency is present.  Pulmonary artery systolic pressure cannot be assessed.     Pulmonic Valve  The pulmonic valve is normal. Trace pulmonic insufficiency is present.     Vessels  The thoracic aorta is normal. The pulmonary artery and bifurcation cannot be  assessed. The inferior vena cava is normal.     Pericardium  No pericardial effusion is present.     Compared to Previous Study  No significant changes noted.  ______________________________________________________________________________  MMode/2D Measurements & Calculations  IVSd: 1.0 cm     LVIDd: 5.2 cm  LVIDs: 3.4 cm  LVPWd: 0.96 cm  FS: 34.6 %  LV mass(C)d: 191.4 grams  LV mass(C)dI: 104.6 grams/m2  Ao root diam: 2.9 cm  asc Aorta Diam: 2.6 cm  LVOT diam: 2.1 cm  LVOT area: 3.5 cm2  LA Volume (BP): 35.3 ml  LA Volume Index (BP): 19.3 ml/m2  RWT: 0.37     Doppler Measurements & Calculations  PA acc time: 0.15 sec

## 2022-03-11 ENCOUNTER — TELEPHONE (OUTPATIENT)
Dept: ONCOLOGY | Facility: CLINIC | Age: 22
End: 2022-03-11
Payer: COMMERCIAL

## 2022-03-11 ENCOUNTER — ONCOLOGY VISIT (OUTPATIENT)
Dept: ONCOLOGY | Facility: CLINIC | Age: 22
End: 2022-03-11
Attending: REGISTERED NURSE
Payer: COMMERCIAL

## 2022-03-11 ENCOUNTER — PATIENT OUTREACH (OUTPATIENT)
Dept: ONCOLOGY | Facility: CLINIC | Age: 22
End: 2022-03-11
Payer: COMMERCIAL

## 2022-03-11 ENCOUNTER — PATIENT OUTREACH (OUTPATIENT)
Dept: CARE COORDINATION | Facility: CLINIC | Age: 22
End: 2022-03-11
Payer: COMMERCIAL

## 2022-03-11 VITALS
DIASTOLIC BLOOD PRESSURE: 91 MMHG | TEMPERATURE: 98.3 F | SYSTOLIC BLOOD PRESSURE: 149 MMHG | WEIGHT: 168.8 LBS | BODY MASS INDEX: 28.97 KG/M2 | HEART RATE: 118 BPM | RESPIRATION RATE: 18 BRPM | OXYGEN SATURATION: 92 %

## 2022-03-11 DIAGNOSIS — G81.10 SPASTIC HEMIPLEGIA, UNSPECIFIED ETIOLOGY, UNSPECIFIED LATERALITY (H): Primary | ICD-10-CM

## 2022-03-11 DIAGNOSIS — D57.00 SICKLE CELL PAIN CRISIS (H): ICD-10-CM

## 2022-03-11 DIAGNOSIS — D57.00 SICKLE CELL PAIN CRISIS (H): Primary | ICD-10-CM

## 2022-03-11 PROCEDURE — 96376 TX/PRO/DX INJ SAME DRUG ADON: CPT

## 2022-03-11 PROCEDURE — 96361 HYDRATE IV INFUSION ADD-ON: CPT

## 2022-03-11 PROCEDURE — 250N000011 HC RX IP 250 OP 636: Performed by: PEDIATRICS

## 2022-03-11 PROCEDURE — 96374 THER/PROPH/DIAG INJ IV PUSH: CPT

## 2022-03-11 PROCEDURE — 258N000003 HC RX IP 258 OP 636: Performed by: PEDIATRICS

## 2022-03-11 PROCEDURE — G0463 HOSPITAL OUTPT CLINIC VISIT: HCPCS

## 2022-03-11 PROCEDURE — 99215 OFFICE O/P EST HI 40 MIN: CPT | Performed by: REGISTERED NURSE

## 2022-03-11 RX ORDER — MORPHINE SULFATE 2 MG/ML
2 INJECTION, SOLUTION INTRAMUSCULAR; INTRAVENOUS
Status: DISCONTINUED | OUTPATIENT
Start: 2022-03-11 | End: 2022-03-11 | Stop reason: HOSPADM

## 2022-03-11 RX ORDER — MORPHINE SULFATE 2 MG/ML
2 INJECTION, SOLUTION INTRAMUSCULAR; INTRAVENOUS
Status: CANCELLED
Start: 2022-04-01

## 2022-03-11 RX ORDER — DIPHENHYDRAMINE HCL 25 MG
25 CAPSULE ORAL
Status: CANCELLED
Start: 2022-04-01

## 2022-03-11 RX ORDER — ONDANSETRON 8 MG/1
8 TABLET, FILM COATED ORAL
Status: CANCELLED
Start: 2022-04-01

## 2022-03-11 RX ORDER — HEPARIN SODIUM (PORCINE) LOCK FLUSH IV SOLN 100 UNIT/ML 100 UNIT/ML
5 SOLUTION INTRAVENOUS
Status: CANCELLED | OUTPATIENT
Start: 2022-04-01

## 2022-03-11 RX ORDER — DIPHENHYDRAMINE HCL 25 MG
25 CAPSULE ORAL
Status: DISCONTINUED | OUTPATIENT
Start: 2022-03-11 | End: 2022-03-11 | Stop reason: HOSPADM

## 2022-03-11 RX ORDER — HEPARIN SODIUM,PORCINE 10 UNIT/ML
5 VIAL (ML) INTRAVENOUS
Status: CANCELLED | OUTPATIENT
Start: 2022-04-01

## 2022-03-11 RX ORDER — OXYCODONE HYDROCHLORIDE 15 MG/1
15 TABLET ORAL EVERY 4 HOURS PRN
Qty: 45 TABLET | Refills: 0 | Status: SHIPPED | OUTPATIENT
Start: 2022-03-11 | End: 2022-03-21

## 2022-03-11 RX ORDER — ONDANSETRON 4 MG/1
8 TABLET, ORALLY DISINTEGRATING ORAL
Status: DISCONTINUED | OUTPATIENT
Start: 2022-03-11 | End: 2022-03-11 | Stop reason: HOSPADM

## 2022-03-11 RX ORDER — HEPARIN SODIUM (PORCINE) LOCK FLUSH IV SOLN 100 UNIT/ML 100 UNIT/ML
5 SOLUTION INTRAVENOUS
Status: DISCONTINUED | OUTPATIENT
Start: 2022-03-11 | End: 2022-03-11 | Stop reason: HOSPADM

## 2022-03-11 RX ADMIN — MORPHINE SULFATE 2 MG: 2 INJECTION, SOLUTION INTRAMUSCULAR; INTRAVENOUS at 14:15

## 2022-03-11 RX ADMIN — MORPHINE SULFATE 2 MG: 2 INJECTION, SOLUTION INTRAMUSCULAR; INTRAVENOUS at 13:00

## 2022-03-11 RX ADMIN — Medication 5 ML: at 14:29

## 2022-03-11 RX ADMIN — DEXTROSE AND SODIUM CHLORIDE 1000 ML: 5; 450 INJECTION, SOLUTION INTRAVENOUS at 12:00

## 2022-03-11 RX ADMIN — MORPHINE SULFATE 2 MG: 2 INJECTION, SOLUTION INTRAMUSCULAR; INTRAVENOUS at 12:00

## 2022-03-11 ASSESSMENT — PAIN SCALES - GENERAL: PAINLEVEL: WORST PAIN (10)

## 2022-03-11 NOTE — PROGRESS NOTES
Patient called triage and is currently roomed and awaiting appointment with Patricia. Triage contacted writer. Spoke on phone with patient briefly before she hung up the phone on me because she is saying she does not want to stay at the appointment with Patricia if she can't get in to infusion afterward.  Writer tried to ask he where her pain is located and patient hung up the phone.  Spoke with Claudia in triage and she tried to let her know that there was still a chance to get her in today, but the patient also hung up on Claudia.    Arely Krueger, RN  RN Care Coordinator  [phone] clinic main line

## 2022-03-11 NOTE — PATIENT INSTRUCTIONS
[facility] Main Line: [phone]    Call triage nurse with chills and/or temperature greater than or equal to 100.4, uncontrolled nausea/vomiting, diarrhea, constipation, dizziness, shortness of breath, chest pain, bleeding, unexplained bruising, or any new/concerning symptoms, questions/concerns.   If you are having any concerning symptoms or wish to speak to a provider before your next infusion visit, please call your care coordinator or triage to notify them so we can adequately serve you.   Triage Nurse Line: [phone]    If after hours, weekends, or holidays [phone]

## 2022-03-11 NOTE — PROGRESS NOTES
Infusion Nursing Note:  Jennifer Cervantes presents today for IVF, IV pain meds.    Patient seen by provider today: Yes: Patricia JOHNS   present during visit today: Not Applicable.    Note: Jennifer is being seen today as an add on for IV fluids and IV pain meds.  Evaluated by [facility] ANDREAS.    Arrived with pain level at a 10, all over.  Morphine 2mg IV given x3 doses.  Pain down to a 5 at the time of discharge which was acceptable for Jennifer.      Intravenous Access:  Implanted Port.      Post Infusion Assessment:  Patient tolerated infusion without incident.  Blood return noted pre and post infusion.  Site patent and intact, free from redness, edema or discomfort.  No evidence of extravasations.  Access discontinued per protocol.       Discharge Plan:   Prescription refills given for Oxycodone.  Copy of AVS reviewed with patient and/or family.  Patient will return 3/21/22 labs/Dr. Duncan for next appointment.  Patient discharged in stable condition accompanied by: self.  Departure Mode: Ambulatory.  Face to Face time: 0.      Monika Lazo RN

## 2022-03-11 NOTE — PROGRESS NOTES
SW received referral to assist with transportation coordination. SW called [employer] and arranged ride for patient. SW informed patient and clinic of ride information [employer] will  patient between 9:45-10am. Patient will call [phone] for return ride when appointment is complete. SW will remain available as needed.         Corry GONZALEZ, [facility]  - Oncology  Phone : [phone]  Pager: [phone]

## 2022-03-11 NOTE — TELEPHONE ENCOUNTER
Refill Request    Date of most recent appointment:  2/17/22  Next upcoming appointment:   Today, 3/11/22  Prescribing provider(s):  Patricia Mantilla, ANDREAS  Person requesting refill:  Jennifer    Medication requested:  Oxycodone  Quantity:  45  Last fill date:  3/3/2022    Notes:  Has requested infusion apt after apt w/provider today. Added to Infusion wait list.  Pain being treated Sickle Cell Pain  Number of pills remaining: 3  When will pt be out of meds: today  Side effects: denies  Number of times pt takes per day: Pt states she takes 15 mg 6 times per day.  Other Has apt with Patricia Mantilla, ANDREAS today.    Not  reviewed.    Pended and Routed to Provider.

## 2022-03-11 NOTE — TELEPHONE ENCOUNTER
Pt called in to triage requesting IVF pain meds for all over pain, lower back pain rated 10/10 since yesterday  Stated last took prn oxycodone, and ms contin at 6 a.m. this morning without relief. Denied any fevers, chills, cough, sob, chest pain, numbness or tingling or other symptoms not typical of sickle cell pain.     Pt's last infusion was 3/6 (in ED), last clinic visit 2/17/22 (Patricia) with follow-up today with Patricia Mantilla, ANDREAS.     Patient states needs a ride today, 20-25 min away. Has apt with Patricia at 1045 but does not have a ride. SW contacted to assist in arranging ride for Jennifer.     Requesting oxy refill--pended up in separate encounter.    Pt qualifies for sickle cell standing order protocol.    If you do not hear from the infusion center by 2pm then you will not be able to get in for an infusion today.     Added to infusion wait list.    1040: Jennifer is calling. She is at clinic. States SW told her that she was calling for ride to infusion. Explained that SW called her for a ride to her scheduled apt with Patricia because pt said she did not have a ride arranged. Jennifer is upset that she does not have an infusion apt, and stated she was going to leave the clinic now.  Advised pt to keep apt with Patricia (scheduled for labs 1045/then provider apt) so she could discuss concerns about pain. Pt asking if it is mandatory; Explained it was important for providers to meet with her so they can assess pain level and general health issues regarding sickle cell disease. Pt is adamant that she wants to leave. Advised against that since she was already in clinic and it is almost time for lab apt. Pt wanted to know if she could be guaranteed an infusion. Explained I could not guarantee that as infusion is pretty full today. Pt hung up on this caller.    Paged Patricia JIMENEZ with update, and Teams messaged/spoke to JOE McgeeCC who also reached out to pt to discuss/encourage pt to stay for apt with provider.     Routed to Patricia  BERNADETTE Mantilla and MAURISIO Mcgee.

## 2022-03-11 NOTE — PROGRESS NOTES
This is a recent snapshot of the patient's [facility] medical record.  For current drug dose and complete information and questions, call [phone] or In Basket pool, fv home infusion ([id number])  CSN Number:  [id number]

## 2022-03-14 ENCOUNTER — TELEPHONE (OUTPATIENT)
Dept: ONCOLOGY | Facility: CLINIC | Age: 22
End: 2022-03-14
Payer: COMMERCIAL

## 2022-03-14 ENCOUNTER — HOME INFUSION (PRE-WILLOW HOME INFUSION) (OUTPATIENT)
Dept: PHARMACY | Facility: CLINIC | Age: 22
End: 2022-03-14

## 2022-03-14 ENCOUNTER — PATIENT OUTREACH (OUTPATIENT)
Dept: CARE COORDINATION | Facility: CLINIC | Age: 22
End: 2022-03-14
Payer: COMMERCIAL

## 2022-03-14 ENCOUNTER — INFUSION THERAPY VISIT (OUTPATIENT)
Dept: INFUSION THERAPY | Facility: CLINIC | Age: 22
End: 2022-03-14
Attending: PEDIATRICS
Payer: COMMERCIAL

## 2022-03-14 VITALS
TEMPERATURE: 98.2 F | SYSTOLIC BLOOD PRESSURE: 128 MMHG | RESPIRATION RATE: 16 BRPM | DIASTOLIC BLOOD PRESSURE: 77 MMHG | HEART RATE: 104 BPM

## 2022-03-14 DIAGNOSIS — D57.00 SICKLE CELL PAIN CRISIS (H): ICD-10-CM

## 2022-03-14 DIAGNOSIS — G81.10 SPASTIC HEMIPLEGIA, UNSPECIFIED ETIOLOGY, UNSPECIFIED LATERALITY (H): Primary | ICD-10-CM

## 2022-03-14 PROCEDURE — 96376 TX/PRO/DX INJ SAME DRUG ADON: CPT

## 2022-03-14 PROCEDURE — 250N000011 HC RX IP 250 OP 636: Performed by: PEDIATRICS

## 2022-03-14 PROCEDURE — 258N000003 HC RX IP 258 OP 636: Performed by: PEDIATRICS

## 2022-03-14 PROCEDURE — 96374 THER/PROPH/DIAG INJ IV PUSH: CPT

## 2022-03-14 PROCEDURE — 96361 HYDRATE IV INFUSION ADD-ON: CPT

## 2022-03-14 RX ORDER — HEPARIN SODIUM (PORCINE) LOCK FLUSH IV SOLN 100 UNIT/ML 100 UNIT/ML
5 SOLUTION INTRAVENOUS
Status: DISCONTINUED | OUTPATIENT
Start: 2022-03-14 | End: 2022-03-14 | Stop reason: HOSPADM

## 2022-03-14 RX ORDER — MORPHINE SULFATE 2 MG/ML
2 INJECTION, SOLUTION INTRAMUSCULAR; INTRAVENOUS
Status: COMPLETED | OUTPATIENT
Start: 2022-03-14 | End: 2022-03-14

## 2022-03-14 RX ORDER — HEPARIN SODIUM,PORCINE 10 UNIT/ML
5 VIAL (ML) INTRAVENOUS
Status: CANCELLED | OUTPATIENT
Start: 2022-04-01

## 2022-03-14 RX ORDER — HEPARIN SODIUM (PORCINE) LOCK FLUSH IV SOLN 100 UNIT/ML 100 UNIT/ML
5 SOLUTION INTRAVENOUS
Status: CANCELLED | OUTPATIENT
Start: 2022-04-01

## 2022-03-14 RX ORDER — DIPHENHYDRAMINE HCL 25 MG
25 CAPSULE ORAL
Status: CANCELLED
Start: 2022-04-01

## 2022-03-14 RX ORDER — ONDANSETRON 8 MG/1
8 TABLET, ORALLY DISINTEGRATING ORAL
Status: DISCONTINUED | OUTPATIENT
Start: 2022-03-14 | End: 2022-03-14 | Stop reason: HOSPADM

## 2022-03-14 RX ORDER — MORPHINE SULFATE 2 MG/ML
2 INJECTION, SOLUTION INTRAMUSCULAR; INTRAVENOUS
Status: CANCELLED
Start: 2022-04-01

## 2022-03-14 RX ORDER — HEPARIN SODIUM,PORCINE 10 UNIT/ML
5 VIAL (ML) INTRAVENOUS
Status: DISCONTINUED | OUTPATIENT
Start: 2022-03-14 | End: 2022-03-14 | Stop reason: HOSPADM

## 2022-03-14 RX ORDER — DIPHENHYDRAMINE HCL 25 MG
25 CAPSULE ORAL
Status: DISCONTINUED | OUTPATIENT
Start: 2022-03-14 | End: 2022-03-14 | Stop reason: HOSPADM

## 2022-03-14 RX ORDER — ONDANSETRON 8 MG/1
8 TABLET, FILM COATED ORAL
Status: CANCELLED
Start: 2022-04-01

## 2022-03-14 RX ADMIN — DEXTROSE AND SODIUM CHLORIDE 1000 ML: 5; 450 INJECTION, SOLUTION INTRAVENOUS at 12:10

## 2022-03-14 RX ADMIN — MORPHINE SULFATE 2 MG: 2 INJECTION, SOLUTION INTRAMUSCULAR; INTRAVENOUS at 12:16

## 2022-03-14 RX ADMIN — MORPHINE SULFATE 2 MG: 2 INJECTION, SOLUTION INTRAMUSCULAR; INTRAVENOUS at 14:21

## 2022-03-14 RX ADMIN — MORPHINE SULFATE 2 MG: 2 INJECTION, SOLUTION INTRAMUSCULAR; INTRAVENOUS at 13:14

## 2022-03-14 RX ADMIN — Medication 5 ML: at 14:25

## 2022-03-14 NOTE — PROGRESS NOTES
Social Work Note: Telephone Call  Oncology Clinic     Data/Intervention:  Patient Name:  Jennifer Cervantes  /Age: 2000, 22 years old     Call From: [facility]        Reason for Call:  Transportation     Assessment:   called [employer] to arrange ride through patient's insurance. [employer] arranged  for patient from home with [employer] ([phone]).  Patient will need to call when ready for return ride home ([phone]).      Plan:  Patient is aware of the transportation plan.  available to assist with any other needs.      CARLOS Chavez,[facility]  Hematology/Oncology Social Worker  Phone:[phone] Pager: [phone]

## 2022-03-14 NOTE — TELEPHONE ENCOUNTER
[facility]    Jennifer notified  Social workers notified to assist with ride  Scheduling notified to place her on schedule.    Routing to Patricia Mantilla as KAYLAN

## 2022-03-14 NOTE — TELEPHONE ENCOUNTER
[facility] Telephone Triage Note    The following symptoms were reported:   Typical  Description:            Onset:  3 days  Location: all over  Character: Sharp           Intensity: 10/10    Accompanying Signs & Symptoms:  No pain, numbness, weakness, discoloration of extremities    Chest Pain:  denies     Shortness of Breath:  denies     Fever:  denies     Chills:  denies   Cough/sore throat:  denies  Other:  Need Transportation    Therapies Tried and outcome: Tylenol, MS Contin and Oxycodone taken around 6am.     Improved by:heat, still requires IVF/Pain    The following provider was consulted:  Meets Protocol per     The following advice/orders were given, and/or interventions recommended:      Patient instructions and/or follow up:      If you do not hear from the infusion center by 2pm then you will not be able to get in for an infusion today. If symptoms worsen while waiting for call back, and/or you experience fever, chills, SOB, chest pain, cough, n/v, dizziness, numbness, swelling, discoloration of extremities, then seek emergency evaluation in Emergency Department.

## 2022-03-14 NOTE — PROGRESS NOTES
Nursing Note  Jennifer Cervantes presents today to [facility] for:   Chief Complaint   Patient presents with     Infusion     IVF, pain meds     During today's [facility] appointment, orders from Dr. Duncan were completed.  Frequency: as needed    Progress note:  Patient identification verified by name and date of birth.  Assessment completed.  Vitals recorded in Doc Flowsheets.  Patient was provided with education regarding medication/procedure and possible side effects.  Patient verbalized understanding.     present during visit today: Not Applicable.    Treatment Conditions: Non-applicable.    Premedications: were not ordered.    Drug Waste Record: No    Infusion length and rate:  infusion given over approximately 2 hours    Labs: were not ordered for this appointment.    Vascular access: port accessed today.    Is the next appt scheduled? prn  Asymptomatic COVID test completed? no    Post Infusion Assessment:  Patient tolerated infusion without incident.     Discharge Plan:   Follow up plan of care with: ordering provider as scheduled.  Discharge instructions were reviewed with patient.  Patient/representative verbalized understanding of discharge instructions and all questions answered.  Patient discharged from [facility] in stable condition.    Yina Ford RN    Administrations This Visit     dextrose 5% and 0.45% NaCl BOLUS     Admin Date  03/14/2022 Action  New Bag Dose  1,000 mL Rate  500 mL/hr Route  Intravenous Administered By  Yina Ford RN          heparin 100 UNIT/ML injection 5 mL     Admin Date  03/14/2022 Action  Given Dose  5 mL Route  Intracatheter Administered By  Yina Ford RN          morphine (PF) injection 2 mg     Admin Date  03/14/2022 Action  Given Dose  2 mg Route  Intravenous Administered By  Yina Ford RN           Admin Date  03/14/2022 Action  Given Dose  2 mg  Route  Intravenous Administered By  Yina Ford, JOE           Admin Date  03/14/2022 Action  Given Dose  2 mg Route  Intravenous Administered By  Yina Ford, RN                /77 (BP Location: Left arm, Patient Position: Semi-Short's)   Pulse 104   Temp 98.2  F (36.8  C) (Oral)   Resp 16

## 2022-03-15 ENCOUNTER — HOSPITAL ENCOUNTER (EMERGENCY)
Facility: CLINIC | Age: 22
Discharge: HOME | End: 2022-03-16
Attending: EMERGENCY MEDICINE | Admitting: EMERGENCY MEDICINE
Payer: COMMERCIAL

## 2022-03-15 VITALS
DIASTOLIC BLOOD PRESSURE: 79 MMHG | OXYGEN SATURATION: 94 % | RESPIRATION RATE: 16 BRPM | SYSTOLIC BLOOD PRESSURE: 141 MMHG | HEART RATE: 87 BPM | TEMPERATURE: 97.8 F

## 2022-03-15 DIAGNOSIS — D57.00 SICKLE CELL PAIN CRISIS (H): ICD-10-CM

## 2022-03-15 PROCEDURE — 99285 EMERGENCY DEPT VISIT HI MDM: CPT | Performed by: EMERGENCY MEDICINE

## 2022-03-15 PROCEDURE — 99285 EMERGENCY DEPT VISIT HI MDM: CPT | Mod: 25 | Performed by: EMERGENCY MEDICINE

## 2022-03-15 RX ORDER — KETOROLAC TROMETHAMINE 15 MG/ML
15 INJECTION, SOLUTION INTRAMUSCULAR; INTRAVENOUS ONCE
Status: COMPLETED | OUTPATIENT
Start: 2022-03-16 | End: 2022-03-16

## 2022-03-15 RX ORDER — SODIUM CHLORIDE, SODIUM LACTATE, POTASSIUM CHLORIDE, CALCIUM CHLORIDE 600; 310; 30; 20 MG/100ML; MG/100ML; MG/100ML; MG/100ML
1000 INJECTION, SOLUTION INTRAVENOUS CONTINUOUS
Status: DISCONTINUED | OUTPATIENT
Start: 2022-03-16 | End: 2022-03-16 | Stop reason: HOSPADM

## 2022-03-15 RX ORDER — OXYCODONE HYDROCHLORIDE 10 MG/1
20 TABLET ORAL ONCE
Status: COMPLETED | OUTPATIENT
Start: 2022-03-16 | End: 2022-03-16

## 2022-03-15 RX ORDER — ONDANSETRON 2 MG/ML
8 INJECTION INTRAMUSCULAR; INTRAVENOUS EVERY 6 HOURS PRN
Status: DISCONTINUED | OUTPATIENT
Start: 2022-03-15 | End: 2022-03-16 | Stop reason: HOSPADM

## 2022-03-15 NOTE — PROGRESS NOTES
This is a recent snapshot of the patient's [facility] medical record.  For current drug dose and complete information and questions, call [phone] or In Basket pool, fv home infusion ([id number])  CSN Number:  [id number]

## 2022-03-15 NOTE — PROGRESS NOTES
SW received referral to assist with transportation coordination. SW called [employer] and arranged ride for patient. SW informed patient and clinic of ride information blue and white round trip ride arrnaged. Patient will call [employer] for return ride when appointment is complete. SW will remain available as needed.         Corry GONZALEZ, [facility]  - Oncology  Phone : [phone]  Pager: [phone]

## 2022-03-16 LAB
ALBUMIN SERPL-MCNC: 3.9 G/DL (ref 3.4–5)
ALP SERPL-CCNC: 83 U/L (ref 40–150)
ALT SERPL W P-5'-P-CCNC: 70 U/L (ref 0–50)
ANION GAP SERPL CALCULATED.3IONS-SCNC: 6 MMOL/L (ref 3–14)
AST SERPL W P-5'-P-CCNC: 86 U/L (ref 0–45)
BASOPHILS # BLD MANUAL: 0 10E3/UL (ref 0–0.2)
BASOPHILS NFR BLD MANUAL: 0 %
BILIRUB SERPL-MCNC: 4.9 MG/DL (ref 0.2–1.3)
BUN SERPL-MCNC: 10 MG/DL (ref 7–30)
CALCIUM SERPL-MCNC: 8.6 MG/DL (ref 8.5–10.1)
CHLORIDE BLD-SCNC: 110 MMOL/L (ref 94–109)
CO2 SERPL-SCNC: 20 MMOL/L (ref 20–32)
CREAT SERPL-MCNC: 0.73 MG/DL (ref 0.52–1.04)
ELLIPTOCYTES BLD QL SMEAR: ABNORMAL
EOSINOPHIL # BLD MANUAL: 0.8 10E3/UL (ref 0–0.7)
EOSINOPHIL NFR BLD MANUAL: 7 %
ERYTHROCYTE [DISTWIDTH] IN BLOOD BY AUTOMATED COUNT: 25.8 % (ref 10–15)
GFR SERPL CREATININE-BSD FRML MDRD: >90 ML/MIN/1.73M2
GLUCOSE BLD-MCNC: 98 MG/DL (ref 70–99)
HCT VFR BLD AUTO: 20.3 % (ref 35–47)
HGB BLD-MCNC: 7.4 G/DL (ref 11.7–15.7)
HOLD SPECIMEN: NORMAL
LYMPHOCYTES # BLD MANUAL: 1.8 10E3/UL (ref 0.8–5.3)
LYMPHOCYTES NFR BLD MANUAL: 17 %
MCH RBC QN AUTO: 32.5 PG (ref 26.5–33)
MCHC RBC AUTO-ENTMCNC: 36.5 G/DL (ref 31.5–36.5)
MCV RBC AUTO: 89 FL (ref 78–100)
MONOCYTES # BLD MANUAL: 1.1 10E3/UL (ref 0–1.3)
MONOCYTES NFR BLD MANUAL: 10 %
NEUTROPHILS # BLD MANUAL: 7.1 10E3/UL (ref 1.6–8.3)
NEUTROPHILS NFR BLD MANUAL: 66 %
NRBC # BLD AUTO: 0.5 10E3/UL
NRBC BLD MANUAL-RTO: 5 %
PLAT MORPH BLD: ABNORMAL
PLATELET # BLD AUTO: 416 10E3/UL (ref 150–450)
POLYCHROMASIA BLD QL SMEAR: ABNORMAL
POTASSIUM BLD-SCNC: 3.8 MMOL/L (ref 3.4–5.3)
PROT SERPL-MCNC: 7.6 G/DL (ref 6.8–8.8)
RBC # BLD AUTO: 2.28 10E6/UL (ref 3.8–5.2)
RBC MORPH BLD: ABNORMAL
RETICS # AUTO: 0.37 10E6/UL (ref 0.03–0.1)
RETICS/RBC NFR AUTO: 16.2 % (ref 0.5–2)
SICKLE CELLS BLD QL SMEAR: ABNORMAL
SODIUM SERPL-SCNC: 136 MMOL/L (ref 133–144)
WBC # BLD AUTO: 10.8 10E3/UL (ref 4–11)

## 2022-03-16 PROCEDURE — 96374 THER/PROPH/DIAG INJ IV PUSH: CPT | Performed by: EMERGENCY MEDICINE

## 2022-03-16 PROCEDURE — 80053 COMPREHEN METABOLIC PANEL: CPT | Performed by: EMERGENCY MEDICINE

## 2022-03-16 PROCEDURE — 82040 ASSAY OF SERUM ALBUMIN: CPT | Performed by: EMERGENCY MEDICINE

## 2022-03-16 PROCEDURE — 96361 HYDRATE IV INFUSION ADD-ON: CPT | Performed by: EMERGENCY MEDICINE

## 2022-03-16 PROCEDURE — 258N000003 HC RX IP 258 OP 636: Performed by: EMERGENCY MEDICINE

## 2022-03-16 PROCEDURE — 96375 TX/PRO/DX INJ NEW DRUG ADDON: CPT | Performed by: EMERGENCY MEDICINE

## 2022-03-16 PROCEDURE — 36415 COLL VENOUS BLD VENIPUNCTURE: CPT | Performed by: EMERGENCY MEDICINE

## 2022-03-16 PROCEDURE — 85045 AUTOMATED RETICULOCYTE COUNT: CPT | Performed by: EMERGENCY MEDICINE

## 2022-03-16 PROCEDURE — 250N000009 HC RX 250: Performed by: EMERGENCY MEDICINE

## 2022-03-16 PROCEDURE — 250N000011 HC RX IP 250 OP 636: Performed by: EMERGENCY MEDICINE

## 2022-03-16 PROCEDURE — 85027 COMPLETE CBC AUTOMATED: CPT | Performed by: EMERGENCY MEDICINE

## 2022-03-16 PROCEDURE — 250N000013 HC RX MED GY IP 250 OP 250 PS 637: Performed by: EMERGENCY MEDICINE

## 2022-03-16 RX ADMIN — Medication 4 MG: at 00:16

## 2022-03-16 RX ADMIN — SODIUM CHLORIDE, POTASSIUM CHLORIDE, SODIUM LACTATE AND CALCIUM CHLORIDE 1000 ML: 600; 310; 30; 20 INJECTION, SOLUTION INTRAVENOUS at 00:15

## 2022-03-16 RX ADMIN — KETOROLAC TROMETHAMINE 15 MG: 15 INJECTION, SOLUTION INTRAMUSCULAR; INTRAVENOUS at 00:16

## 2022-03-16 RX ADMIN — OXYCODONE HYDROCHLORIDE 20 MG: 10 TABLET ORAL at 00:15

## 2022-03-16 NOTE — ED TRIAGE NOTES
Pt reports sickle cell pain crisis. Pt has generalized pain that she rates 10/10. Denies any other issues.

## 2022-03-16 NOTE — ED PROVIDER NOTES
ED Provider Note  [facility]      History     Chief Complaint   Patient presents with     Sickle Cell Pain Crisis     The history is provided by the patient.     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  Patient reports pain all over but severe pain in bilateral legs.  She states that this crisis started suddenly.  She tried taking a hot shower with no relief of pain.  She takes oxycodone at home.  Denies chest pain.  No fevers or recent illnesses.  No change in bowel or bladder.  Denies numbness, tingling, or weakness.  No swelling or rashes.  Patient has been eating and drinking normally.    Past Medical History  Past Medical History:   Diagnosis Date     Anxiety      Bleeding disorder (H)      Blood clotting disorder (H)      Cerebral infarction (H) 2015     Cognitive developmental delay     low IQ. Please recognize when managing pain and planning with her     Depressive disorder      Hemiplegia and hemiparesis following cerebral infarction affecting right dominant side (H)     right hand contractures     Iron overload due to repeated red blood cell transfusions      Migraines      Multiple subsegmental pulmonary emboli without acute cor pulmonale (H) 02/01/2021     Oppositional defiant behavior      Superficial venous thrombosis of arm, right 03/25/2021     Uncomplicated asthma      Past Surgical History:   Procedure Laterality Date     AS INSERT TUNNELED CV 2 CATH W/O PORT/PUMP       CHOLECYSTECTOMY       CV RIGHT HEART CATH MEASUREMENTS RECORDED N/A 11/18/2021    Procedure: Right Heart Cath;  Surgeon: Jackson Stauffer MD;  Location:  HEART CARDIAC CATH LAB     INSERT PORT VASCULAR ACCESS Left 4/21/2021    Procedure:  INSERTION, VASCULAR ACCESS PORT (NOT SURE ON SIDE UNTIL REMOVAL);  Surgeon: Rajan More MD;  Location: [facility] OR     IR CHEST PORT PLACEMENT > 5 YRS OF AGE  4/21/2021     IR CVC NON TUNNEL LINE REMOVAL  5/6/2021     IR CVC NON TUNNEL PLACEMENT  04/07/2020     IR CVC NON TUNNEL PLACEMENT  4/30/2021     IR PORT REMOVAL LEFT  4/21/2021     REMOVE PORT VASCULAR ACCESS Left 4/21/2021    Procedure: REMOVAL, VASCULAR ACCESS PORT LEFT;  Surgeon: Rajan More MD;  Location: [facility] OR     REPAIR TENDON ELBOW Right 10/02/2019    Procedure: Right Elbow Flexor Lengthening, Flexor Pronator Slide Of Wrist and Finger, Thumb Adductor Lengthening;  Surgeon: Anai Franco MD;  [facility]     TONSILLECTOMY Bilateral 10/02/2019    Procedure: Bilateral Tonsillectomy;  Surgeon: Farhana Guy MD;  [facility]     ZZC BREAST REDUCTION (INCLUDES LIPO) TIER 3 Bilateral 04/23/2019     acetaminophen (TYLENOL) 325 MG tablet  albuterol (PROAIR HFA/PROVENTIL HFA/VENTOLIN HFA) 108 (90 Base) MCG/ACT inhaler  albuterol (PROVENTIL) (2.5 MG/3ML) 0.083% neb solution  aspirin (ASA) 81 MG chewable tablet  budesonide-formoterol (SYMBICORT) 160-4.5 MCG/ACT Inhaler  deferasirox (JADENU) 360 MG tablet  diphenhydrAMINE (BENADRYL) 25 MG capsule  EPINEPHrine (ANY BX GENERIC EQUIV) 0.3 MG/0.3ML injection 2-pack  Hydroxyurea 1000 MG TABS  medroxyPROGESTERone (DEPO-PROVERA) 150 MG/ML IM injection  morphine (MS CONTIN) 15 MG CR tablet  ondansetron (ZOFRAN) 8 MG tablet  oxyCODONE IR (ROXICODONE) 15 MG tablet      Allergies   Allergen Reactions     Contrast Dye      Hives and breathing issues     Fish-Derived Products Hives     Seafood Hives     Diagnostic X-Ray Materials      Gadolinium      Family History  Family History   Problem Relation Age of Onset     Sickle Cell Trait Mother      Hypertension Mother      Asthma Mother      Sickle Cell Trait Father      Social History   Social History     Tobacco Use     Smoking status: Never  Smoker     Smokeless tobacco: Never Used   Substance Use Topics     Alcohol use: Not Currently     Alcohol/week: 0.0 standard drinks     Drug use: Never      Past medical history, past surgical history, medications, allergies, family history, and social history were reviewed with the patient. No additional pertinent items.       Review of Systems  ROS: 14 point ROS neg other than the symptoms noted above in the HPI.    Physical Exam      Physical Exam  Physical Exam   Constitutional: oriented to person, place, and time. appears well-developed and well-nourished.   HENT:   Head: Normocephalic and atraumatic.   Neck: Normal range of motion.   Pulmonary/Chest: Effort normal. No respiratory distress. Clear lungs  Cardiac: No murmurs, rubs, gallops. RRR.  Abdominal: Abdomen soft, nontender, nondistended. No rebound tenderness.  MSK: Long bones without deformity or evidence of trauma. No TTP over the T/L spine  Neurological: alert and oriented to person, place, and time. Moving all extremities.PERRL.  Skin: Skin is warm and dry.   Psychiatric:  normal mood and affect.  behavior is normal. Thought content normal.     ED Course      Procedures       No results found for any visits on 03/15/22.  Medications - No data to display     Assessments & Plan (with Medical Decision Making)   MDM  Patient presenting with sickle cell pain that is similar to multiple prior presentations.  No chest pain or symptoms of acute chest.  No other symptoms to suggest stroke, PE.  Patient started on pain plan for hematology.     Re eval: Patient much improved after ketamine, oxycodone and Toradol.  She slept for a few hours and feels much better.  She will be discharged.  She agrees with plan.  She is no further complaints.    I have reviewed the nursing notes. I have reviewed the findings, diagnosis, plan and need for follow up with the patient.    New Prescriptions    No medications on file       Final diagnoses:   Sickle cell pain crisis (H)        --  I, Amalia Garcia, am serving as a trained medical scribe to document services personally performed by Andrew Chou MD, based on the provider's statements to me.     I, Andrew Chou MD, was physically present and have reviewed and verified the accuracy of this note documented by Amalia Garcia.    Andrew Chou MD  [facility] EMERGENCY DEPARTMENT  3/15/2022     Andrew Chou MD  03/16/22 0253

## 2022-03-16 NOTE — DISCHARGE INSTRUCTIONS
Please make an appointment to follow up with Your Primary Care Provider as soon as possible.    Return to the emergency department if your symptoms worsen

## 2022-03-18 ENCOUNTER — TELEPHONE (OUTPATIENT)
Dept: ONCOLOGY | Facility: CLINIC | Age: 22
End: 2022-03-18
Payer: COMMERCIAL

## 2022-03-18 ENCOUNTER — INFUSION THERAPY VISIT (OUTPATIENT)
Dept: ONCOLOGY | Facility: CLINIC | Age: 22
End: 2022-03-18
Attending: PEDIATRICS
Payer: COMMERCIAL

## 2022-03-18 ENCOUNTER — PATIENT OUTREACH (OUTPATIENT)
Dept: CARE COORDINATION | Facility: CLINIC | Age: 22
End: 2022-03-18
Payer: COMMERCIAL

## 2022-03-18 VITALS
HEART RATE: 116 BPM | RESPIRATION RATE: 20 BRPM | DIASTOLIC BLOOD PRESSURE: 90 MMHG | OXYGEN SATURATION: 90 % | TEMPERATURE: 98 F | SYSTOLIC BLOOD PRESSURE: 137 MMHG

## 2022-03-18 DIAGNOSIS — D57.00 SICKLE CELL PAIN CRISIS (H): ICD-10-CM

## 2022-03-18 DIAGNOSIS — G81.10 SPASTIC HEMIPLEGIA, UNSPECIFIED ETIOLOGY, UNSPECIFIED LATERALITY (H): Primary | ICD-10-CM

## 2022-03-18 PROCEDURE — 96376 TX/PRO/DX INJ SAME DRUG ADON: CPT

## 2022-03-18 PROCEDURE — 250N000011 HC RX IP 250 OP 636: Performed by: PEDIATRICS

## 2022-03-18 PROCEDURE — 258N000003 HC RX IP 258 OP 636: Performed by: PEDIATRICS

## 2022-03-18 PROCEDURE — 96374 THER/PROPH/DIAG INJ IV PUSH: CPT

## 2022-03-18 PROCEDURE — 96361 HYDRATE IV INFUSION ADD-ON: CPT

## 2022-03-18 RX ORDER — ONDANSETRON 8 MG/1
8 TABLET, FILM COATED ORAL
Status: CANCELLED
Start: 2022-04-01

## 2022-03-18 RX ORDER — HEPARIN SODIUM (PORCINE) LOCK FLUSH IV SOLN 100 UNIT/ML 100 UNIT/ML
5 SOLUTION INTRAVENOUS
Status: CANCELLED | OUTPATIENT
Start: 2022-04-01

## 2022-03-18 RX ORDER — MORPHINE SULFATE 2 MG/ML
2 INJECTION, SOLUTION INTRAMUSCULAR; INTRAVENOUS
Status: DISCONTINUED | OUTPATIENT
Start: 2022-03-18 | End: 2022-03-18 | Stop reason: HOSPADM

## 2022-03-18 RX ORDER — MORPHINE SULFATE 2 MG/ML
2 INJECTION, SOLUTION INTRAMUSCULAR; INTRAVENOUS
Status: CANCELLED
Start: 2022-04-01

## 2022-03-18 RX ORDER — HEPARIN SODIUM (PORCINE) LOCK FLUSH IV SOLN 100 UNIT/ML 100 UNIT/ML
5 SOLUTION INTRAVENOUS
Status: DISCONTINUED | OUTPATIENT
Start: 2022-03-18 | End: 2022-03-18 | Stop reason: HOSPADM

## 2022-03-18 RX ORDER — HEPARIN SODIUM,PORCINE 10 UNIT/ML
5 VIAL (ML) INTRAVENOUS
Status: CANCELLED | OUTPATIENT
Start: 2022-04-01

## 2022-03-18 RX ORDER — DIPHENHYDRAMINE HCL 25 MG
25 CAPSULE ORAL
Status: CANCELLED
Start: 2022-04-01

## 2022-03-18 RX ADMIN — DEXTROSE AND SODIUM CHLORIDE 1000 ML: 5; 450 INJECTION, SOLUTION INTRAVENOUS at 09:38

## 2022-03-18 RX ADMIN — MORPHINE SULFATE 2 MG: 2 INJECTION, SOLUTION INTRAMUSCULAR; INTRAVENOUS at 11:43

## 2022-03-18 RX ADMIN — MORPHINE SULFATE 2 MG: 2 INJECTION, SOLUTION INTRAMUSCULAR; INTRAVENOUS at 10:47

## 2022-03-18 RX ADMIN — Medication 5 ML: at 12:24

## 2022-03-18 RX ADMIN — MORPHINE SULFATE 2 MG: 2 INJECTION, SOLUTION INTRAMUSCULAR; INTRAVENOUS at 09:40

## 2022-03-18 ASSESSMENT — PAIN SCALES - GENERAL: PAINLEVEL: EXTREME PAIN (9)

## 2022-03-18 NOTE — PATIENT INSTRUCTIONS
[facility] Triage and after hours / weekends / holidays:  [phone]    Please call the triage or after hours line if you experience a temperature greater than or equal to 100.5, shaking chills, have uncontrolled nausea, vomiting and/or diarrhea, dizziness, shortness of breath, chest pain, bleeding, unexplained bruising, or if you have any other new/concerning symptoms, questions or concerns.      If you are having any concerning symptoms or wish to speak to a provider before your next infusion visit, please call your care coordinator or triage to notify them so we can adequately serve you.     If you need a refill on a narcotic prescription or other medication, please call before your infusion appointment.       March 2022 Sunday Monday Tuesday Wednesday Thursday Friday Saturday             1     2     3    ONC INFUSION 2 HR (120 MIN)  11:15 AM   (120 min.)    ONC INFUSION NURSE   [facility] 4  Happy Birthday!    NM LUNG SCAN VENT/PERF   9:15 AM   (60 min.)   [id number]   [facility]    XR CHEST 2 VIEWS  10:30 AM   (20 min.)   [id number]   [facility] 5       6    Admission  11:02 PM   Jered Slade MD   [facility] Emergency Department   (Discharge: 3/7/2022) 7     8     9     10    RETURN PULMONARY HYPERTENSION   2:15 PM   (30 min.)   Gaetano Mccormick MD   [facility] 11    RETURN  10:30 AM   (45 min.)   Patricia Mantilla CNP   [facility]    ONC INFUSION 2 HR (120 MIN)  12:00 PM   (120 min.)    ONC INFUSION NURSE   [facility] 12       13     14    SPEC INFUSION 2 HR (120 MIN)  12:00 PM   (120 min.)    SIPC INFUSION NURSE   [facility] 15    Admission  11:23 PM   [facility] Emergency Department   (Discharge: 3/16/2022) 16     17     18    ONC INFUSION 2 HR (120 MIN)   9:30 AM   (120 min.)   DENISE  ONC INFUSION NURSE   [facility] 19       20     21    LAB PERIPHERAL   1:00 PM   (15 min.)    [facility] DRAW   [facility]    RETURN   1:15 PM   (30 min.)   Eric Duncan MD   [facility] 22     23     24     25     26       27     28     29     30     31 April 2022 Sunday Monday Tuesday Wednesday Thursday Friday Saturday                            1     2       3     4     5     6     7     8     9       10     11     12     13     14     15    RETURN  10:45 AM   (60 min.)   Katherine Pierce MD   [facility] 16       17     18     19     20     21     22     23       24     25     26     27     28     29     30

## 2022-03-18 NOTE — TELEPHONE ENCOUNTER
[facility] Telephone Triage Note    The following symptoms were reported:   Typical  Description:            Onset: Last night   Location: sides and back  Character: Sharp           Intensity: 9/10    Accompanying Signs & Symptoms:  none    Chest Pain:  denies     Shortness of Breath:  denies     Fever:  denies     Chills:  denies   Cough/sore throat:  denies  Other:  Need transportation    Therapies Tried and outcome:  Last oxycodone taken around 6am.   Last IVF/Pain was in ED on 3/15/22.     Improved by: Heat but still needs IVF/Pain    The following provider was consulted:  Meets Protocol    The following advice/orders were given, and/or interventions recommended:  Pending appt availability  Appt offer in [employer] available today for 9:30am.     [id number]  Abdullahi paged to assist with transportation    Patient instructions and/or follow up:   Pt confirmed for IVF/Pain appt today at 9:30am    Scheduling request sent    If you do not hear from the infusion center by 2pm then you will not be able to get in for an infusion today. If symptoms worsen while waiting for call back, and/or you experience fever, chills, SOB, chest pain, cough, n/v, dizziness, numbness, swelling, discoloration of extremities, then seek emergency evaluation in Emergency Department.

## 2022-03-18 NOTE — PROGRESS NOTES
Social Work Note: Telephone Call  Oncology Clinic     Data/Intervention:  Patient Name:  Jennifer Cervantes  /Age: 2000, 22 years old     Call From: [facility]         Reason for Call:  Transportation     Assessment:   called [employer] ([phone]) to arrange ride. Round trip ride with a will call return ride arranged. Patient will need to call when ready for return ride home ([phone]).      Plan:  Patient is aware of the transportation plan.  available to assist with any other needs.      CARLOS Chavez,SW  Hematology/Oncology Social Worker  Phone:[phone] Pager: [phone]

## 2022-03-20 ENCOUNTER — HOSPITAL ENCOUNTER (EMERGENCY)
Facility: CLINIC | Age: 22
Discharge: HOME | End: 2022-03-20
Attending: EMERGENCY MEDICINE | Admitting: EMERGENCY MEDICINE
Payer: COMMERCIAL

## 2022-03-20 VITALS
RESPIRATION RATE: 20 BRPM | TEMPERATURE: 98.1 F | WEIGHT: 170 LBS | DIASTOLIC BLOOD PRESSURE: 88 MMHG | OXYGEN SATURATION: 92 % | HEIGHT: 64 IN | SYSTOLIC BLOOD PRESSURE: 159 MMHG | BODY MASS INDEX: 29.02 KG/M2 | HEART RATE: 94 BPM

## 2022-03-20 DIAGNOSIS — D57.00 SICKLE CELL PAIN CRISIS (H): ICD-10-CM

## 2022-03-20 LAB
ANION GAP SERPL CALCULATED.3IONS-SCNC: 6 MMOL/L (ref 3–14)
BASOPHILS # BLD MANUAL: 0.1 10E3/UL (ref 0–0.2)
BASOPHILS NFR BLD MANUAL: 1 %
BUN SERPL-MCNC: 6 MG/DL (ref 7–30)
CALCIUM SERPL-MCNC: 8.2 MG/DL (ref 8.5–10.1)
CHLORIDE BLD-SCNC: 112 MMOL/L (ref 94–109)
CO2 SERPL-SCNC: 20 MMOL/L (ref 20–32)
CREAT SERPL-MCNC: 0.46 MG/DL (ref 0.52–1.04)
ELLIPTOCYTES BLD QL SMEAR: ABNORMAL
EOSINOPHIL # BLD MANUAL: 0.3 10E3/UL (ref 0–0.7)
EOSINOPHIL NFR BLD MANUAL: 3 %
ERYTHROCYTE [DISTWIDTH] IN BLOOD BY AUTOMATED COUNT: ABNORMAL %
GFR SERPL CREATININE-BSD FRML MDRD: >90 ML/MIN/1.73M2
GLUCOSE BLD-MCNC: 110 MG/DL (ref 70–99)
HCT VFR BLD AUTO: 21.3 % (ref 35–47)
HGB BLD-MCNC: 7.6 G/DL (ref 11.7–15.7)
HOLD SPECIMEN: NORMAL
LYMPHOCYTES # BLD MANUAL: 2.4 10E3/UL (ref 0.8–5.3)
LYMPHOCYTES NFR BLD MANUAL: 22 %
MCH RBC QN AUTO: 33 PG (ref 26.5–33)
MCHC RBC AUTO-ENTMCNC: 35.7 G/DL (ref 31.5–36.5)
MCV RBC AUTO: 93 FL (ref 78–100)
MONOCYTES # BLD MANUAL: 1 10E3/UL (ref 0–1.3)
MONOCYTES NFR BLD MANUAL: 9 %
NEUTROPHILS # BLD MANUAL: 7 10E3/UL (ref 1.6–8.3)
NEUTROPHILS NFR BLD MANUAL: 65 %
NRBC # BLD AUTO: 1.6 10E3/UL
NRBC BLD MANUAL-RTO: 15 %
PLAT MORPH BLD: ABNORMAL
PLATELET # BLD AUTO: 373 10E3/UL (ref 150–450)
POTASSIUM BLD-SCNC: 3.5 MMOL/L (ref 3.4–5.3)
RBC # BLD AUTO: 2.3 10E6/UL (ref 3.8–5.2)
RBC MORPH BLD: ABNORMAL
RETICS # AUTO: 0.6 10E6/UL (ref 0.03–0.1)
RETICS/RBC NFR AUTO: 26.3 % (ref 0.5–2)
SICKLE CELLS BLD QL SMEAR: ABNORMAL
SODIUM SERPL-SCNC: 138 MMOL/L (ref 133–144)
TARGETS BLD QL SMEAR: ABNORMAL
WBC # BLD AUTO: 10.8 10E3/UL (ref 4–11)

## 2022-03-20 PROCEDURE — 85045 AUTOMATED RETICULOCYTE COUNT: CPT | Performed by: EMERGENCY MEDICINE

## 2022-03-20 PROCEDURE — 250N000009 HC RX 250: Performed by: EMERGENCY MEDICINE

## 2022-03-20 PROCEDURE — 80048 BASIC METABOLIC PNL TOTAL CA: CPT | Performed by: EMERGENCY MEDICINE

## 2022-03-20 PROCEDURE — 258N000003 HC RX IP 258 OP 636: Performed by: EMERGENCY MEDICINE

## 2022-03-20 PROCEDURE — 96374 THER/PROPH/DIAG INJ IV PUSH: CPT

## 2022-03-20 PROCEDURE — 250N000011 HC RX IP 250 OP 636: Performed by: EMERGENCY MEDICINE

## 2022-03-20 PROCEDURE — 36415 COLL VENOUS BLD VENIPUNCTURE: CPT | Performed by: EMERGENCY MEDICINE

## 2022-03-20 PROCEDURE — 99285 EMERGENCY DEPT VISIT HI MDM: CPT | Mod: 25

## 2022-03-20 PROCEDURE — 99285 EMERGENCY DEPT VISIT HI MDM: CPT | Performed by: EMERGENCY MEDICINE

## 2022-03-20 PROCEDURE — 96375 TX/PRO/DX INJ NEW DRUG ADDON: CPT

## 2022-03-20 PROCEDURE — 85027 COMPLETE CBC AUTOMATED: CPT | Performed by: EMERGENCY MEDICINE

## 2022-03-20 PROCEDURE — 96361 HYDRATE IV INFUSION ADD-ON: CPT

## 2022-03-20 RX ORDER — SODIUM CHLORIDE, SODIUM LACTATE, POTASSIUM CHLORIDE, CALCIUM CHLORIDE 600; 310; 30; 20 MG/100ML; MG/100ML; MG/100ML; MG/100ML
INJECTION, SOLUTION INTRAVENOUS ONCE
Status: COMPLETED | OUTPATIENT
Start: 2022-03-20 | End: 2022-03-20

## 2022-03-20 RX ORDER — KETOROLAC TROMETHAMINE 15 MG/ML
15 INJECTION, SOLUTION INTRAMUSCULAR; INTRAVENOUS ONCE
Status: COMPLETED | OUTPATIENT
Start: 2022-03-20 | End: 2022-03-20

## 2022-03-20 RX ORDER — HEPARIN SODIUM (PORCINE) LOCK FLUSH IV SOLN 100 UNIT/ML 100 UNIT/ML
100 SOLUTION INTRAVENOUS ONCE
Status: COMPLETED | OUTPATIENT
Start: 2022-03-20 | End: 2022-03-20

## 2022-03-20 RX ADMIN — Medication 4 MG: at 04:59

## 2022-03-20 RX ADMIN — Medication 100 UNITS: at 05:57

## 2022-03-20 RX ADMIN — KETOROLAC TROMETHAMINE 15 MG: 15 INJECTION, SOLUTION INTRAMUSCULAR; INTRAVENOUS at 04:54

## 2022-03-20 RX ADMIN — SODIUM CHLORIDE, POTASSIUM CHLORIDE, SODIUM LACTATE AND CALCIUM CHLORIDE: 600; 310; 30; 20 INJECTION, SOLUTION INTRAVENOUS at 05:04

## 2022-03-20 NOTE — ED TRIAGE NOTES
Pt presents to ED c/o generalized pain x 1 day due to sickle cell crisis. Pt attempted home pain regiment with no relief.

## 2022-03-20 NOTE — ED PROVIDER NOTES
ED Provider Note  [facility]      History     Chief Complaint   Patient presents with     Sickle Cell Pain Crisis     HPI  Jennifer Cervantes is a 22 year old female who presents for sickle cell pain crisis.  She reports pain all over her body.  It started yesterday.  She has pain in her arms legs and back.  This is her typical location for her pain.  She denies any fevers chills chest pain cough shortness of breath.  She denies any abdominal pain nausea vomiting diarrhea dysuria.  She tried taking her home opiates for pain control however they have not helped with her pain.  She denies any new weakness tingling numbness.  Denies any rashes or swelling.    Past Medical History  Past Medical History:   Diagnosis Date     Anxiety      Bleeding disorder (H)      Blood clotting disorder (H)      Cerebral infarction (H) 2015     Cognitive developmental delay     low IQ. Please recognize when managing pain and planning with her     Depressive disorder      Hemiplegia and hemiparesis following cerebral infarction affecting right dominant side (H)     right hand contractures     Iron overload due to repeated red blood cell transfusions      Migraines      Multiple subsegmental pulmonary emboli without acute cor pulmonale (H) 02/01/2021     Oppositional defiant behavior      Superficial venous thrombosis of arm, right 03/25/2021     Uncomplicated asthma      Past Surgical History:   Procedure Laterality Date     AS INSERT TUNNELED CV 2 CATH W/O PORT/PUMP       CHOLECYSTECTOMY       CV RIGHT HEART CATH MEASUREMENTS RECORDED N/A 11/18/2021    Procedure: Right Heart Cath;  Surgeon: Jackson Stauffer MD;  Location:  HEART CARDIAC CATH LAB     INSERT PORT VASCULAR ACCESS Left 4/21/2021    Procedure: INSERTION, VASCULAR ACCESS PORT (NOT SURE ON SIDE UNTIL REMOVAL);  Surgeon: Rajan More MD;  Location: [facility] OR     IR CHEST PORT PLACEMENT > 5 YRS OF AGE  4/21/2021     IR CVC NON TUNNEL LINE REMOVAL   5/6/2021     IR CVC NON TUNNEL PLACEMENT  04/07/2020     IR CVC NON TUNNEL PLACEMENT  4/30/2021     IR PORT REMOVAL LEFT  4/21/2021     REMOVE PORT VASCULAR ACCESS Left 4/21/2021    Procedure: REMOVAL, VASCULAR ACCESS PORT LEFT;  Surgeon: Rajan More MD;  Location: [facility] OR     REPAIR TENDON ELBOW Right 10/02/2019    Procedure: Right Elbow Flexor Lengthening, Flexor Pronator Slide Of Wrist and Finger, Thumb Adductor Lengthening;  Surgeon: Anai Franco MD;  [facility]     TONSILLECTOMY Bilateral 10/02/2019    Procedure: Bilateral Tonsillectomy;  Surgeon: Farhana Guy MD;  [facility]     ZZC BREAST REDUCTION (INCLUDES LIPO) TIER 3 Bilateral 04/23/2019     acetaminophen (TYLENOL) 325 MG tablet  albuterol (PROAIR HFA/PROVENTIL HFA/VENTOLIN HFA) 108 (90 Base) MCG/ACT inhaler  albuterol (PROVENTIL) (2.5 MG/3ML) 0.083% neb solution  aspirin (ASA) 81 MG chewable tablet  budesonide-formoterol (SYMBICORT) 160-4.5 MCG/ACT Inhaler  deferasirox (JADENU) 360 MG tablet  diphenhydrAMINE (BENADRYL) 25 MG capsule  EPINEPHrine (ANY BX GENERIC EQUIV) 0.3 MG/0.3ML injection 2-pack  Hydroxyurea 1000 MG TABS  medroxyPROGESTERone (DEPO-PROVERA) 150 MG/ML IM injection  morphine (MS CONTIN) 15 MG CR tablet  ondansetron (ZOFRAN) 8 MG tablet  oxyCODONE IR (ROXICODONE) 15 MG tablet      Allergies   Allergen Reactions     Contrast Dye      Hives and breathing issues     Fish-Derived Products Hives     Seafood Hives     Diagnostic X-Ray Materials      Gadolinium      Family History  Family History   Problem Relation Age of Onset     Sickle Cell Trait Mother      Hypertension Mother      Asthma Mother      Sickle Cell Trait Father      Social History   Social History     Tobacco Use     Smoking status: Never Smoker     Smokeless tobacco: Never Used   Substance Use Topics     Alcohol use: Not Currently     Alcohol/week: 0.0 standard drinks     Drug use: Never      Past medical history, past surgical history,  "medications, allergies, family history, and social history were reviewed with the patient. No additional pertinent items.       Review of Systems  A complete review of systems was performed with pertinent positives and negatives noted in the HPI, and all other systems negative.    Physical Exam   BP: (!) 159/88  Pulse: 105  Temp: 98.1  F (36.7  C)  Resp: 18  Height: 162.6 cm (5' 4\")  Weight: 77.1 kg (170 lb)  SpO2: 96 %  Physical Exam  Constitutional:       General: She is not in acute distress.     Appearance: Normal appearance.   HENT:      Head: Normocephalic and atraumatic.      Mouth/Throat:      Mouth: Mucous membranes are moist.   Eyes:      Extraocular Movements: Extraocular movements intact.      Pupils: Pupils are equal, round, and reactive to light.   Cardiovascular:      Rate and Rhythm: Regular rhythm. Tachycardia present.      Pulses: Normal pulses.   Pulmonary:      Effort: Pulmonary effort is normal.      Breath sounds: Normal breath sounds.   Abdominal:      General: Abdomen is flat.      Palpations: Abdomen is soft.      Tenderness: There is abdominal tenderness. There is rebound. There is no guarding.   Musculoskeletal:      Cervical back: Normal range of motion and neck supple.      Comments: Patient with residual right-sided deficits from previous stroke and contractures of the right upper extremity   Neurological:      Mental Status: She is alert and oriented to person, place, and time. Mental status is at baseline.         ED Course      Procedures          Results for orders placed or performed during the hospital encounter of 03/20/22   Basic metabolic panel     Status: Abnormal   Result Value Ref Range    Sodium 138 133 - 144 mmol/L    Potassium 3.5 3.4 - 5.3 mmol/L    Chloride 112 (H) 94 - 109 mmol/L    Carbon Dioxide (CO2) 20 20 - 32 mmol/L    Anion Gap 6 3 - 14 mmol/L    Urea Nitrogen 6 (L) 7 - 30 mg/dL    Creatinine 0.46 (L) 0.52 - 1.04 mg/dL    Calcium 8.2 (L) 8.5 - 10.1 mg/dL    " Glucose 110 (H) 70 - 99 mg/dL    GFR Estimate >90 >60 mL/min/1.73m2   Reticulocyte count     Status: Abnormal   Result Value Ref Range    % Reticulocyte 26.3 (H) 0.5 - 2.0 %    Absolute Reticulocyte 0.604 (H) 0.025 - 0.095 10e6/uL   CBC with platelets and differential     Status: Abnormal   Result Value Ref Range    WBC Count 10.8 4.0 - 11.0 10e3/uL    RBC Count 2.30 (L) 3.80 - 5.20 10e6/uL    Hemoglobin 7.6 (L) 11.7 - 15.7 g/dL    Hematocrit 21.3 (L) 35.0 - 47.0 %    MCV 93 78 - 100 fL    MCH 33.0 26.5 - 33.0 pg    MCHC 35.7 31.5 - 36.5 g/dL    RDW      Platelet Count 373 150 - 450 10e3/uL   [place] Draw     Status: None (In process)    Narrative    The following orders were created for panel order [place] Draw.  Procedure                               Abnormality         Status                     ---------                               -----------         ------                     Extra Blue Top Tube[[id number]]                              In process                   Please view results for these tests on the individual orders.   Manual Differential     Status: Abnormal   Result Value Ref Range    % Neutrophils 65 %    % Lymphocytes 22 %    % Monocytes 9 %    % Eosinophils 3 %    % Basophils 1 %    NRBCs per 100 WBC 15 (H) <=0 %    Absolute Neutrophils 7.0 1.6 - 8.3 10e3/uL    Absolute Lymphocytes 2.4 0.8 - 5.3 10e3/uL    Absolute Monocytes 1.0 0.0 - 1.3 10e3/uL    Absolute Eosinophils 0.3 0.0 - 0.7 10e3/uL    Absolute Basophils 0.1 0.0 - 0.2 10e3/uL    Absolute NRBCs 1.6 (H) <=0.0 10e3/uL    RBC Morphology Confirmed RBC Indices     Platelet Assessment  Automated Count Confirmed. Platelet morphology is normal.     Automated Count Confirmed. Platelet morphology is normal.    Elliptocytes Moderate (A) None Seen    Sickle Cells Marked (A) None Seen    Target Cells Moderate (A) None Seen   CBC with platelets differential     Status: Abnormal    Narrative    The following orders were created for panel order CBC with  platelets differential.  Procedure                               Abnormality         Status                     ---------                               -----------         ------                     CBC with platelets and d...[[id number]]  Abnormal            Final result               Manual Differential[[id number]]          Abnormal            Final result                 Please view results for these tests on the individual orders.     Medications   lactated ringers infusion ( Intravenous New Bag 3/20/22 0505)   ketamine (KETALAR) injection 4 mg (4 mg Intravenous Given 3/20/22 0457)   ketorolac (TORADOL) injection 15 mg (15 mg Intravenous Given 3/20/22 0330)        Assessments & Plan (with Medical Decision Making)   Patient nontoxic in no acute distress presents for sickle cell pain crisis.  This is typical for her normal pain crises.  She denies any symptoms of acute stress or new stroke.  Exam does not show any evidence of this either.  Laboratory work was obtained.  She was treated per her pain pathway.  Her pain is now improved she feels well enough to go home.  She is stable for discharge and outpatient follow-up.    I have reviewed the nursing notes. I have reviewed the findings, diagnosis, plan and need for follow up with the patient.    New Prescriptions    No medications on file       Final diagnoses:   Sickle cell pain crisis (H)       --  Artie Taylor MD  [facility] EMERGENCY DEPARTMENT  3/20/2022     Artie Taylor MD  03/20/22 4310

## 2022-03-20 NOTE — DISCHARGE INSTRUCTIONS
Please make an appointment to follow up with Your Primary Care Provider and Primary Care Center (phone: [phone]) as soon as possible as needed.

## 2022-03-21 ENCOUNTER — PATIENT OUTREACH (OUTPATIENT)
Dept: CARE COORDINATION | Facility: CLINIC | Age: 22
End: 2022-03-21
Payer: COMMERCIAL

## 2022-03-21 ENCOUNTER — INFUSION THERAPY VISIT (OUTPATIENT)
Dept: INFUSION THERAPY | Facility: CLINIC | Age: 22
End: 2022-03-21
Payer: COMMERCIAL

## 2022-03-21 ENCOUNTER — APPOINTMENT (OUTPATIENT)
Dept: LAB | Facility: CLINIC | Age: 22
End: 2022-03-21
Payer: COMMERCIAL

## 2022-03-21 ENCOUNTER — NURSE TRIAGE (OUTPATIENT)
Dept: ONCOLOGY | Facility: CLINIC | Age: 22
End: 2022-03-21
Payer: COMMERCIAL

## 2022-03-21 ENCOUNTER — ONCOLOGY VISIT (OUTPATIENT)
Dept: ONCOLOGY | Facility: CLINIC | Age: 22
End: 2022-03-21
Payer: COMMERCIAL

## 2022-03-21 VITALS
OXYGEN SATURATION: 95 % | SYSTOLIC BLOOD PRESSURE: 133 MMHG | RESPIRATION RATE: 18 BRPM | TEMPERATURE: 98.3 F | DIASTOLIC BLOOD PRESSURE: 85 MMHG | HEART RATE: 114 BPM

## 2022-03-21 VITALS
BODY MASS INDEX: 29.16 KG/M2 | WEIGHT: 169.9 LBS | DIASTOLIC BLOOD PRESSURE: 88 MMHG | TEMPERATURE: 97.9 F | OXYGEN SATURATION: 93 % | HEART RATE: 117 BPM | RESPIRATION RATE: 16 BRPM | SYSTOLIC BLOOD PRESSURE: 152 MMHG

## 2022-03-21 DIAGNOSIS — E83.111 IRON OVERLOAD DUE TO REPEATED RED BLOOD CELL TRANSFUSIONS: ICD-10-CM

## 2022-03-21 DIAGNOSIS — D57.00 SICKLE CELL PAIN CRISIS (H): ICD-10-CM

## 2022-03-21 DIAGNOSIS — D57.1 HB-SS DISEASE WITHOUT CRISIS (H): Primary | ICD-10-CM

## 2022-03-21 DIAGNOSIS — I82.611 SUPERFICIAL VENOUS THROMBOSIS OF ARM, RIGHT: ICD-10-CM

## 2022-03-21 DIAGNOSIS — M79.621 PAIN OF RIGHT UPPER ARM: ICD-10-CM

## 2022-03-21 DIAGNOSIS — G81.10 SPASTIC HEMIPLEGIA, UNSPECIFIED ETIOLOGY, UNSPECIFIED LATERALITY (H): Primary | ICD-10-CM

## 2022-03-21 LAB
ALBUMIN SERPL-MCNC: 3.8 G/DL (ref 3.4–5)
ALP SERPL-CCNC: 83 U/L (ref 40–150)
ALT SERPL W P-5'-P-CCNC: 67 U/L (ref 0–50)
ANION GAP SERPL CALCULATED.3IONS-SCNC: 11 MMOL/L (ref 3–14)
AST SERPL W P-5'-P-CCNC: 73 U/L (ref 0–45)
BASOPHILS # BLD MANUAL: 0 10E3/UL (ref 0–0.2)
BASOPHILS NFR BLD MANUAL: 0 %
BILIRUB SERPL-MCNC: 4.1 MG/DL (ref 0.2–1.3)
BUN SERPL-MCNC: 6 MG/DL (ref 7–30)
CALCIUM SERPL-MCNC: 8.4 MG/DL (ref 8.5–10.1)
CHLORIDE BLD-SCNC: 110 MMOL/L (ref 94–109)
CO2 SERPL-SCNC: 19 MMOL/L (ref 20–32)
CREAT SERPL-MCNC: 0.45 MG/DL (ref 0.52–1.04)
EOSINOPHIL # BLD MANUAL: 0.4 10E3/UL (ref 0–0.7)
EOSINOPHIL NFR BLD MANUAL: 4 %
ERYTHROCYTE [DISTWIDTH] IN BLOOD BY AUTOMATED COUNT: 24.4 % (ref 10–15)
FERRITIN SERPL-MCNC: 4156 NG/ML (ref 12–150)
GFR SERPL CREATININE-BSD FRML MDRD: >90 ML/MIN/1.73M2
GLUCOSE BLD-MCNC: 100 MG/DL (ref 70–99)
HCT VFR BLD AUTO: 22.3 % (ref 35–47)
HGB BLD-MCNC: 7.9 G/DL (ref 11.7–15.7)
LYMPHOCYTES # BLD MANUAL: 0.9 10E3/UL (ref 0.8–5.3)
LYMPHOCYTES NFR BLD MANUAL: 9 %
MCH RBC QN AUTO: 32.5 PG (ref 26.5–33)
MCHC RBC AUTO-ENTMCNC: 35.4 G/DL (ref 31.5–36.5)
MCV RBC AUTO: 92 FL (ref 78–100)
MONOCYTES # BLD MANUAL: 0.6 10E3/UL (ref 0–1.3)
MONOCYTES NFR BLD MANUAL: 6 %
NEUTROPHILS # BLD MANUAL: 8.2 10E3/UL (ref 1.6–8.3)
NEUTROPHILS NFR BLD MANUAL: 81 %
NRBC # BLD AUTO: 1.7 10E3/UL
NRBC BLD MANUAL-RTO: 17 %
PLAT MORPH BLD: ABNORMAL
PLATELET # BLD AUTO: 387 10E3/UL (ref 150–450)
POLYCHROMASIA BLD QL SMEAR: ABNORMAL
POTASSIUM BLD-SCNC: 3.6 MMOL/L (ref 3.4–5.3)
PROT SERPL-MCNC: 7.8 G/DL (ref 6.8–8.8)
RBC # BLD AUTO: 2.43 10E6/UL (ref 3.8–5.2)
RBC MORPH BLD: ABNORMAL
SICKLE CELLS BLD QL SMEAR: ABNORMAL
SODIUM SERPL-SCNC: 140 MMOL/L (ref 133–144)
TARGETS BLD QL SMEAR: SLIGHT
WBC # BLD AUTO: 10.1 10E3/UL (ref 4–11)

## 2022-03-21 PROCEDURE — 82728 ASSAY OF FERRITIN: CPT | Performed by: PEDIATRICS

## 2022-03-21 PROCEDURE — 96361 HYDRATE IV INFUSION ADD-ON: CPT

## 2022-03-21 PROCEDURE — 258N000003 HC RX IP 258 OP 636: Performed by: PEDIATRICS

## 2022-03-21 PROCEDURE — 96376 TX/PRO/DX INJ SAME DRUG ADON: CPT

## 2022-03-21 PROCEDURE — 36591 DRAW BLOOD OFF VENOUS DEVICE: CPT | Performed by: PEDIATRICS

## 2022-03-21 PROCEDURE — 250N000011 HC RX IP 250 OP 636: Performed by: PEDIATRICS

## 2022-03-21 PROCEDURE — 96374 THER/PROPH/DIAG INJ IV PUSH: CPT

## 2022-03-21 PROCEDURE — 82310 ASSAY OF CALCIUM: CPT | Performed by: PEDIATRICS

## 2022-03-21 PROCEDURE — 85014 HEMATOCRIT: CPT | Performed by: PEDIATRICS

## 2022-03-21 PROCEDURE — 99215 OFFICE O/P EST HI 40 MIN: CPT | Performed by: PEDIATRICS

## 2022-03-21 RX ORDER — HEPARIN SODIUM (PORCINE) LOCK FLUSH IV SOLN 100 UNIT/ML 100 UNIT/ML
5 SOLUTION INTRAVENOUS
Status: CANCELLED | OUTPATIENT
Start: 2022-04-01

## 2022-03-21 RX ORDER — HEPARIN SODIUM,PORCINE 10 UNIT/ML
5 VIAL (ML) INTRAVENOUS
Status: CANCELLED | OUTPATIENT
Start: 2022-04-01

## 2022-03-21 RX ORDER — MORPHINE SULFATE 2 MG/ML
2 INJECTION, SOLUTION INTRAMUSCULAR; INTRAVENOUS
Status: CANCELLED
Start: 2022-04-01

## 2022-03-21 RX ORDER — MORPHINE SULFATE 2 MG/ML
2 INJECTION, SOLUTION INTRAMUSCULAR; INTRAVENOUS
Status: DISCONTINUED | OUTPATIENT
Start: 2022-03-21 | End: 2022-03-21 | Stop reason: HOSPADM

## 2022-03-21 RX ORDER — OXYCODONE HYDROCHLORIDE 15 MG/1
15 TABLET ORAL EVERY 4 HOURS PRN
Qty: 45 TABLET | Refills: 0 | Status: SHIPPED | OUTPATIENT
Start: 2022-03-21 | End: 2022-04-04

## 2022-03-21 RX ORDER — MORPHINE SULFATE 15 MG/1
15 TABLET, FILM COATED, EXTENDED RELEASE ORAL EVERY 12 HOURS
Qty: 60 TABLET | Refills: 0 | Status: SHIPPED | OUTPATIENT
Start: 2022-03-21 | End: 2022-04-29

## 2022-03-21 RX ORDER — HEPARIN SODIUM (PORCINE) LOCK FLUSH IV SOLN 100 UNIT/ML 100 UNIT/ML
5 SOLUTION INTRAVENOUS
Status: DISCONTINUED | OUTPATIENT
Start: 2022-03-21 | End: 2022-03-21 | Stop reason: HOSPADM

## 2022-03-21 RX ORDER — ASPIRIN 81 MG/1
81 TABLET, CHEWABLE ORAL 2 TIMES DAILY
Qty: 60 TABLET | Refills: 11 | Status: SHIPPED | OUTPATIENT
Start: 2022-03-21 | End: 2022-07-11

## 2022-03-21 RX ORDER — HEPARIN SODIUM (PORCINE) LOCK FLUSH IV SOLN 100 UNIT/ML 100 UNIT/ML
500 SOLUTION INTRAVENOUS ONCE
Status: COMPLETED | OUTPATIENT
Start: 2022-03-21 | End: 2022-03-21

## 2022-03-21 RX ORDER — ONDANSETRON 8 MG/1
8 TABLET, FILM COATED ORAL
Status: CANCELLED
Start: 2022-04-01

## 2022-03-21 RX ORDER — DEFERASIROX 360 MG/1
1440 TABLET, FILM COATED ORAL EVERY EVENING
Qty: 120 TABLET | Refills: 4 | Status: SHIPPED | OUTPATIENT
Start: 2022-03-21 | End: 2022-03-29

## 2022-03-21 RX ORDER — DIPHENHYDRAMINE HCL 25 MG
25 CAPSULE ORAL
Status: CANCELLED
Start: 2022-04-01

## 2022-03-21 RX ADMIN — MORPHINE SULFATE 2 MG: 2 INJECTION, SOLUTION INTRAMUSCULAR; INTRAVENOUS at 11:15

## 2022-03-21 RX ADMIN — Medication 500 UNITS: at 10:35

## 2022-03-21 RX ADMIN — MORPHINE SULFATE 2 MG: 2 INJECTION, SOLUTION INTRAMUSCULAR; INTRAVENOUS at 13:15

## 2022-03-21 RX ADMIN — MORPHINE SULFATE 2 MG: 2 INJECTION, SOLUTION INTRAMUSCULAR; INTRAVENOUS at 12:07

## 2022-03-21 RX ADMIN — DEXTROSE AND SODIUM CHLORIDE 1000 ML: 5; 450 INJECTION, SOLUTION INTRAVENOUS at 11:15

## 2022-03-21 RX ADMIN — Medication 5 ML: at 13:22

## 2022-03-21 ASSESSMENT — PAIN SCALES - GENERAL: PAINLEVEL: EXTREME PAIN (9)

## 2022-03-21 NOTE — PROGRESS NOTES
Infusion Nursing Note:  Jennifer Cervantes presents today for IVF and pain meds.    Patient seen by provider today: Yes   present during visit today: Not Applicable.    Note: morphine X 3 doses. Patient initial pain 9/10, 5/10 at discharge. Patient states this is tolerable. Pain goal met.  Patient declines PRN zofran and benadryl today.     Intravenous Access:  Implanted Port.    Treatment Conditions:  Not Applicable.    Post Infusion Assessment:  Patient tolerated infusion without incident.  Blood return noted pre and post infusion.  Site patent and intact, free from redness, edema or discomfort.  No evidence of extravasations.  Access discontinued per protocol.     Discharge Plan:   Discharge instructions reviewed with: Patient.  Patient and/or family verbalized understanding of discharge instructions and all questions answered.  AVS to patient via [employer].  Patient will return PRN for next appointment.   Patient discharged in stable condition accompanied by: self.  Departure Mode: Ambulatory.    Administrations This Visit     dextrose 5% and 0.45% NaCl BOLUS     Admin Date  03/21/2022 Action  New Bag Dose  1,000 mL Rate  500 mL/hr Route  Intravenous Administered By  Claudia Landry RN          heparin 100 UNIT/ML injection 5 mL     Admin Date  03/21/2022 Action  Given Dose  5 mL Route  Intracatheter Administered By  Claudia Landry RN          morphine (PF) injection 2 mg     Admin Date  03/21/2022 Action  Given Dose  2 mg Route  Intravenous Administered By  Claudia Landry RN           Admin Date  03/21/2022 Action  Given Dose  2 mg Route  Intravenous Administered By  Claudia Landry RN           Admin Date  03/21/2022 Action  Given Dose  2 mg Route  Intravenous Administered By  Claudia Landry RN Julie Backhaus, RN

## 2022-03-21 NOTE — PROGRESS NOTES
This is a recent snapshot of the patient's [facility] medical record.  For current drug dose and complete information and questions, call [phone] or In Basket pool, [facility] home infusion ([id number])  CSN Number:  [id number]

## 2022-03-21 NOTE — NURSING NOTE
"Chief Complaint   Patient presents with     Port Draw     Labs drawn via port by RN in lab.  VS taken       Port accessed with 20 gauge 3/4\" gripper needle by RN, labs collected, line flushed with saline and heparin.  Vitals taken. Pt checked in for appointment(s).    Lilian Chiu RN      "

## 2022-03-21 NOTE — TELEPHONE ENCOUNTER
[facility] Triage    Incoming call:    Pt called in to triage requesting IVF pain meds for lower back pain rated 8/10  X 1 days. Stated last took MS CONTIN and Oxy this morning without relief. Denied any fevers, chills, cough, sob, chest pain or other symptoms. Denies confusion, numbness, paralysis, vomiting, headache, vision issues, difficulty walking and/or talking. Pt's last infusion was 3/18/22, last clinic visit 3/11/22 Jose Rafael with follow-up on today with Dr. Duncan. Patient states they do not have their own ride and it will take 60 to get to cancer clinic. Patient is out of home medications, sent refill encounter to Dr. Duncan. Pt qualifies for sickle cell standing order protocol.    1 pm lab and 130 Dr. Duncan  Refill sent to Dr. Duncan for Oxy    Routing to Dr. Duncan    [facility] appt at 11, 1030 lab prior to Perla. UDAY setting up ride and SW will confirm with triage and Jennifer if ride is available.    Routing  to Dr. Duncan and Arely Krueger

## 2022-03-21 NOTE — PROGRESS NOTES
Social Work Note: Telephone Call  Oncology Clinic     Data/Intervention:  Patient Name:  Jennifer Cervantes  /Age: 2000, 22 years old     Call From: [facility]        Reason for Call:  Transportation     Assessment:   called [employer] ([phone]) to arrange ride. Arranged  for patient from home with a will call return ride.  Patient will need to call when ready for return ride home ([phone]).      Plan:  Patient is aware of the transportation plan.  available to assist with any other needs.      CARLOS Chavez,[facility]  Hematology/Oncology Social Worker  Phone:[phone] Pager: [phone]

## 2022-03-21 NOTE — TELEPHONE ENCOUNTER
[facility] Triage - No  ACCESS    Refill Request    Date of most recent appointment:  3/11/22 Jose Rafael  Next upcoming appointment:   Today Perla    Medication requested:  Oxy  Quantity:  45  Last fill date:  3/11/22  Person requesting refill:  Self  Notes: How many left: 9  No S/E  Pharmacy: [address] [place] [address]    Prescribing provider(s):  Jose Rafael    Has appt today with Dr. Duncan and labs, placed on waiting list for IVF and Pain meds per protocol.    Routing to Dr. Duncan

## 2022-03-21 NOTE — PATIENT INSTRUCTIONS
Dear Jennifer Cervantes    Thank you for choosing [facility] ([facility]) for your infusion.  The following information is a summary of our appointment as well as important reminders.      We look forward in seeing you on your next appointment here at [facility] ([facility]).  Please don t hesitate to call us at [phone] to reschedule any of your appointments or to speak with one of the [facility] registered nurses.  It was a pleasure taking care of you today.    Sincerely,    [facility]  [facility]  [address] [place] [address]  [address]  Phone:  [phone]

## 2022-03-21 NOTE — PROGRESS NOTES
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     ED visits had decreased although she was utilizing infusion center most days of the week for pain management. Infusion center visits were limited to two times per week starting ~1/24/22.    She was seen today for routine hematology follow-up and was seen in infusion while getting a treatment for pain.    Since Jennifer's last clinic visit, she has been to the ED twice, about once a week, but has been able to discharge for both without quick return.  She has also been able to be managed on her twice a week infusion visits.  She tends to come on Mondays and Fridays which allows her to manage around the weekend.  She did say that she moved the Desferal infusions to the middle of the week, still for 48 hours, because she likes the nursing staff to come out better at that point.  It sounds like she and the nurses are in a rhythm where the medicine gets hooked up and then she or her mom and that removing the needle and its completed.  Otherwise, Jennifer said that she has had a good point in the relationship with her mom and others in the house.  She feels like things have been pretty smooth and she is gaining more confidence in her self-care independence.  She is wondering whether the ketamine infusion she gets in the ED, which started recently, can have more than 1 dose.  She said it really helps resolve some of the pain whereas the opiates tend to overall do less.  I said I would check with the  "emergency department.  Otherwise no recent cough or cold.  She is off the warfarin and back on her aspirin.  She is not having any bleeding issues or concerns with the aspirin.  No headaches or new stroke symptoms.    ---------------------------------------  Jennifer Cervantes's Goals (discussed 03/21/2022 )    1-3 month goal:  Work on finding a job    6 month goal:  Work on driving    12 month goal:      Disease-specific goal(s):  Continue to stay out of the hospital  ---------------------------------------    Interval History:  Jennifer is experiencing acute pain today. States this is \"all over\". After our visit today she will be able to get in to the infusion center for IV fluids and pain medication. She was seen in the ED once this week for uncomplicated sickle cell crisis and was able to discharge home without admission. Her blood pressure is high today. She states she checks it at home every other day and notes it has been high but is unable to tell me exactly what the readings have been. She denies chest pain, shortness of breath of fevers/chills. She has been compliant with her medications including now taking aspirin BID. She has no additional concerns today.    Current Outpatient Medications   Medication Sig Dispense Refill     acetaminophen (TYLENOL) 325 MG tablet Take 2 tablets (650 mg) by mouth every 6 hours as needed for mild pain 120 tablet 3     albuterol (PROAIR HFA/PROVENTIL HFA/VENTOLIN HFA) 108 (90 Base) MCG/ACT inhaler Inhale 2 puffs into the lungs every 6 hours as needed for shortness of breath / dyspnea or wheezing 8.5 g 3     albuterol (PROVENTIL) (2.5 MG/3ML) 0.083% neb solution Take 1 vial (2.5 mg) by nebulization every 6 hours as needed for shortness of breath / dyspnea or wheezing 12 mL 4     aspirin (ASA) 81 MG chewable tablet Take 1 tablet (81 mg) by mouth 2 times daily 60 tablet 11     budesonide-formoterol (SYMBICORT) 160-4.5 MCG/ACT Inhaler Inhale 2 puffs into the lungs 2 times daily 10.2 g " 3     deferasirox (JADENU) 360 MG tablet Take 4 tablets (1,440 mg) by mouth every evening 120 tablet 4     diphenhydrAMINE (BENADRYL) 25 MG capsule Take 1-2 capsules (25-50 mg) by mouth nightly as needed for sleep 60 capsule 3     EPINEPHrine (ANY BX GENERIC EQUIV) 0.3 MG/0.3ML injection 2-pack Inject 0.3 mLs (0.3 mg) into the muscle as needed for anaphylaxis (Patient not taking: Reported on 2/21/2022) 1 each 1     Hydroxyurea 1000 MG TABS Take 3,000 mg by mouth daily 90 tablet 3     medroxyPROGESTERone (DEPO-PROVERA) 150 MG/ML IM injection Inject 150 mg into the muscle       morphine (MS CONTIN) 15 MG CR tablet Take 1 tablet (15 mg) by mouth every 12 hours 60 tablet 0     ondansetron (ZOFRAN) 8 MG tablet Take 1 tablet (8 mg) by mouth every 8 hours as needed 30 tablet 1     oxyCODONE IR (ROXICODONE) 15 MG tablet Take 1 tablet (15 mg) by mouth every 4 hours as needed for severe pain Goal 4 per day. Max 6 per day. 45 tablet 0       Past Medical History  Past Medical History:   Diagnosis Date     Anxiety      Bleeding disorder (H)      Blood clotting disorder (H)      Cerebral infarction (H) 2015     Cognitive developmental delay     low IQ. Please recognize when managing pain and planning with her     Depressive disorder      Hemiplegia and hemiparesis following cerebral infarction affecting right dominant side (H)     right hand contractures     Iron overload due to repeated red blood cell transfusions      Migraines      Multiple subsegmental pulmonary emboli without acute cor pulmonale (H) 02/01/2021     Oppositional defiant behavior      Superficial venous thrombosis of arm, right 03/25/2021     Uncomplicated asthma      Past Surgical History:   Procedure Laterality Date     AS INSERT TUNNELED CV 2 CATH W/O PORT/PUMP       CHOLECYSTECTOMY       CV RIGHT HEART CATH MEASUREMENTS RECORDED N/A 11/18/2021    Procedure: Right Heart Cath;  Surgeon: Jackson Stauffer MD;  Location:  HEART CARDIAC CATH LAB     INSERT  PORT VASCULAR ACCESS Left 4/21/2021    Procedure: INSERTION, VASCULAR ACCESS PORT (NOT SURE ON SIDE UNTIL REMOVAL);  Surgeon: Rajan More MD;  Location: [facility] OR     IR CHEST PORT PLACEMENT > 5 YRS OF AGE  4/21/2021     IR CVC NON TUNNEL LINE REMOVAL  5/6/2021     IR CVC NON TUNNEL PLACEMENT  04/07/2020     IR CVC NON TUNNEL PLACEMENT  4/30/2021     IR PORT REMOVAL LEFT  4/21/2021     REMOVE PORT VASCULAR ACCESS Left 4/21/2021    Procedure: REMOVAL, VASCULAR ACCESS PORT LEFT;  Surgeon: Rajan More MD;  Location: [facility] OR     REPAIR TENDON ELBOW Right 10/02/2019    Procedure: Right Elbow Flexor Lengthening, Flexor Pronator Slide Of Wrist and Finger, Thumb Adductor Lengthening;  Surgeon: Anai Franco MD;  Location: [facility] OR     TONSILLECTOMY Bilateral 10/02/2019    Procedure: Bilateral Tonsillectomy;  Surgeon: Farhana Guy MD;  Location: [facility] OR     ZZC BREAST REDUCTION (INCLUDES LIPO) TIER 3 Bilateral 04/23/2019     Allergies   Allergen Reactions     Contrast Dye      Hives and breathing issues     Fish-Derived Products Hives     Seafood Hives     Diagnostic X-Ray Materials      Gadolinium      Social History   Social History     Tobacco Use     Smoking status: Never Smoker     Smokeless tobacco: Never Used   Substance Use Topics     Alcohol use: Not Currently     Alcohol/week: 0.0 standard drinks     Drug use: Never      Past medical history and social history were reviewed.    Physical Examination:  BP (!) 152/88   Pulse 117   Temp 97.9  F (36.6  C) (Oral)   Resp 16   Wt 77.1 kg (169 lb 14.4 oz)   SpO2 93%   BMI 29.16 kg/m    Wt Readings from Last 10 Encounters:   03/21/22 77.1 kg (169 lb 14.4 oz)   03/20/22 77.1 kg (170 lb)   03/11/22 76.6 kg (168 lb 12.8 oz)   03/06/22 77.6 kg (171 lb)   02/21/22 77.6 kg (171 lb 1.6 oz)   02/17/22 76.4 kg (168 lb 8 oz)   02/13/22 77.1 kg (170 lb)   02/04/22 78.7 kg (173 lb 9.6 oz)   01/31/22 76.9 kg (169 lb 9.6 oz)   01/23/22 74.8 kg (165 lb)      General: Well-appearing female, seems mildly uncomfortable but more talkative today  Eyes: EOMI, PERRL. No scleral icterus.  Skin: port site with needle in place  Respiratory:  Normal respiratory effort. Lungs clear to auscultation.  Cardiac: Tachycardic rate, normal rhythm. No murmur.  Neurologic: Cranial nerves II through XII are grossly intact. Contractured right hand 2/2 stroke history  Skin: No rashes, petechiae, or bruising noted on exposed skin.    Laboratory Data:  Results for HIMANSHU AL (MRN [id number]) as of 3/25/2022 11:35   Ref. Range 3/21/2022 10:35   Sodium Latest Ref Range: 133 - 144 mmol/L 140   Potassium Latest Ref Range: 3.4 - 5.3 mmol/L 3.6   Chloride Latest Ref Range: 94 - 109 mmol/L 110 (H)   Carbon Dioxide Latest Ref Range: 20 - 32 mmol/L 19 (L)   Urea Nitrogen Latest Ref Range: 7 - 30 mg/dL 6 (L)   Creatinine Latest Ref Range: 0.52 - 1.04 mg/dL 0.45 (L)   GFR Estimate Latest Ref Range: >60 mL/min/1.73m2 >90   Calcium Latest Ref Range: 8.5 - 10.1 mg/dL 8.4 (L)   Anion Gap Latest Ref Range: 3 - 14 mmol/L 11   Albumin Latest Ref Range: 3.4 - 5.0 g/dL 3.8   Protein Total Latest Ref Range: 6.8 - 8.8 g/dL 7.8   Bilirubin Total Latest Ref Range: 0.2 - 1.3 mg/dL 4.1 (H)   Alkaline Phosphatase Latest Ref Range: 40 - 150 U/L 83   ALT Latest Ref Range: 0 - 50 U/L 67 (H)   AST Latest Ref Range: 0 - 45 U/L 73 (H)   Ferritin Latest Ref Range: 12 - 150 ng/mL 4,156 (H)   Glucose Latest Ref Range: 70 - 99 mg/dL 100 (H)   WBC Latest Ref Range: 4.0 - 11.0 10e3/uL 10.1   Hemoglobin Latest Ref Range: 11.7 - 15.7 g/dL 7.9 (L)   Hematocrit Latest Ref Range: 35.0 - 47.0 % 22.3 (L)   Platelet Count Latest Ref Range: 150 - 450 10e3/uL 387   RBC Count Latest Ref Range: 3.80 - 5.20 10e6/uL 2.43 (L)   MCV Latest Ref Range: 78 - 100 fL 92   MCH Latest Ref Range: 26.5 - 33.0 pg 32.5   MCHC Latest Ref Range: 31.5 - 36.5 g/dL 35.4   RDW Latest Ref Range: 10.0 - 15.0 % 24.4 (H)   % Neutrophils Latest Units: % 81   %  Lymphocytes Latest Units: % 9   % Monocytes Latest Units: % 6   % Eosinophils Latest Units: % 4   % Basophils Latest Units: % 0   Absolute Basophils Latest Ref Range: 0.0 - 0.2 10e3/uL 0.0   NRBC/W Latest Ref Range: <=0 % 17 (H)   Absolute Neutrophil Latest Ref Range: 1.6 - 8.3 10e3/uL 8.2   Absolute Lymphocytes Latest Ref Range: 0.8 - 5.3 10e3/uL 0.9   Absolute Monocytes Latest Ref Range: 0.0 - 1.3 10e3/uL 0.6   Absolute Eosinophils Latest Ref Range: 0.0 - 0.7 10e3/uL 0.4   Absolute NRBCs Latest Ref Range: <=0.0 10e3/uL 1.7 (H)   RBC Morphology Unknown Confirmed RBC Indices   Platelet Morphology Latest Ref Range: Automated Count Confirmed. Platelet morphology is normal.  Automated Count Confirmed. Platelet morphology is normal.   Polychromasia Latest Ref Range: None Seen  Moderate (A)   Sickle Cells Latest Ref Range: None Seen  Moderate (A)   Target Cells Latest Ref Range: None Seen  Slight (A)       Most recent labs reviewed and interpreted by me today.    Assessment and Plan:  1. Sickle Cell HgbSS Disease  2. Chronic Pain  3. Iron overload  4. Port discomfort   5. 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 History of CVA    Overall Jennifer has been doing well pain management, adhering to twice a week infusion center visits and attempting to remain out of the ED as much as possible.  She has been in the emergency department twice in the last 2 weeks but the current regimen tends to be working quite well in terms of opioid sparing and avoiding admissions.  She has been off of the warfarin now and back on her aspirin, which I dosed twice a day.  She has not had any issues with that transition.  She continues on her Hydrea, Jadenu, and Desferal.  She has asked again about evaluation of her port, so I will reach back out to IR on that topic.  Given that the discussions were going well and she is happy with how her pain management is going, I defer to discussion on Suboxone today.  I did say  that I would recheck to the emergency department on whether ketamine can be given more than once as I am not sure about that.    Plan:  -Continue Hydrea to 3000mg daily to help lessen frequency of sickle cell pain  -Continue MS Contin and Oxycodone at home. MS Contin refilled today.  -Infusion center visits limited to two times per week. Continue diligent home management with current medications, heat, rest, compression, warm baths.   -If unable to manage at home can go to ED but continue to not do IV narcotics in the emergency room. Ketamine previously added to pain plan in ED Will check about dosing regimen  -Continue Desferal and Jadenu for iron overload.  -Continue aspirin BID.   - Will contact IR about port discussion  -RTC in ~4 weeks and will plan to review sickle cell health maintenance at that time.    45 minutes spent on the date of the encounter doing chart review, review of test results, patient visit and documentation         Eric Duncan MD  Hematologist  Division of Hematology, Oncology, and Transplantation  [facility]  [facility]  Pager: [phone]

## 2022-03-23 ENCOUNTER — PATIENT OUTREACH (OUTPATIENT)
Dept: ONCOLOGY | Facility: CLINIC | Age: 22
End: 2022-03-23
Payer: COMMERCIAL

## 2022-03-23 ENCOUNTER — HOME INFUSION (PRE-WILLOW HOME INFUSION) (OUTPATIENT)
Dept: PHARMACY | Facility: CLINIC | Age: 22
End: 2022-03-23

## 2022-03-25 ENCOUNTER — INFUSION THERAPY VISIT (OUTPATIENT)
Dept: INFUSION THERAPY | Facility: CLINIC | Age: 22
End: 2022-03-25
Attending: PEDIATRICS
Payer: COMMERCIAL

## 2022-03-25 ENCOUNTER — PATIENT OUTREACH (OUTPATIENT)
Dept: CARE COORDINATION | Facility: CLINIC | Age: 22
End: 2022-03-25
Payer: COMMERCIAL

## 2022-03-25 ENCOUNTER — TELEPHONE (OUTPATIENT)
Dept: ONCOLOGY | Facility: CLINIC | Age: 22
End: 2022-03-25
Payer: COMMERCIAL

## 2022-03-25 VITALS
HEART RATE: 114 BPM | RESPIRATION RATE: 18 BRPM | DIASTOLIC BLOOD PRESSURE: 85 MMHG | SYSTOLIC BLOOD PRESSURE: 135 MMHG | OXYGEN SATURATION: 92 % | TEMPERATURE: 98.3 F

## 2022-03-25 DIAGNOSIS — G81.10 SPASTIC HEMIPLEGIA, UNSPECIFIED ETIOLOGY, UNSPECIFIED LATERALITY (H): ICD-10-CM

## 2022-03-25 DIAGNOSIS — D57.01 SICKLE CELL DISEASE, WITH ACUTE CHEST SYNDROME (H): Primary | ICD-10-CM

## 2022-03-25 DIAGNOSIS — Z01.812 ENCOUNTER FOR PREPROCEDURE SCREENING LABORATORY TESTING FOR COVID-19: Primary | ICD-10-CM

## 2022-03-25 DIAGNOSIS — Z01.812 ENCOUNTER FOR PREPROCEDURE SCREENING LABORATORY TESTING FOR COVID-19: ICD-10-CM

## 2022-03-25 DIAGNOSIS — Z11.52 ENCOUNTER FOR PREPROCEDURE SCREENING LABORATORY TESTING FOR COVID-19: Primary | ICD-10-CM

## 2022-03-25 DIAGNOSIS — Z11.52 ENCOUNTER FOR PREPROCEDURE SCREENING LABORATORY TESTING FOR COVID-19: ICD-10-CM

## 2022-03-25 DIAGNOSIS — D57.00 SICKLE CELL PAIN CRISIS (H): ICD-10-CM

## 2022-03-25 DIAGNOSIS — D57.00 SICKLE CELL CRISIS (H): Primary | ICD-10-CM

## 2022-03-25 PROCEDURE — 96375 TX/PRO/DX INJ NEW DRUG ADDON: CPT

## 2022-03-25 PROCEDURE — 96374 THER/PROPH/DIAG INJ IV PUSH: CPT

## 2022-03-25 PROCEDURE — 258N000003 HC RX IP 258 OP 636: Performed by: PEDIATRICS

## 2022-03-25 PROCEDURE — 250N000011 HC RX IP 250 OP 636: Performed by: PEDIATRICS

## 2022-03-25 PROCEDURE — U0005 INFEC AGEN DETEC AMPLI PROBE: HCPCS

## 2022-03-25 RX ORDER — HEPARIN SODIUM,PORCINE 10 UNIT/ML
5 VIAL (ML) INTRAVENOUS
Status: CANCELLED | OUTPATIENT
Start: 2022-04-01

## 2022-03-25 RX ORDER — HEPARIN SODIUM (PORCINE) LOCK FLUSH IV SOLN 100 UNIT/ML 100 UNIT/ML
5 SOLUTION INTRAVENOUS
Status: DISCONTINUED | OUTPATIENT
Start: 2022-03-25 | End: 2022-03-25 | Stop reason: HOSPADM

## 2022-03-25 RX ORDER — DIPHENHYDRAMINE HCL 25 MG
25 CAPSULE ORAL
Status: CANCELLED
Start: 2022-04-01

## 2022-03-25 RX ORDER — MORPHINE SULFATE 2 MG/ML
2 INJECTION, SOLUTION INTRAMUSCULAR; INTRAVENOUS
Status: DISCONTINUED | OUTPATIENT
Start: 2022-03-25 | End: 2022-03-25 | Stop reason: HOSPADM

## 2022-03-25 RX ORDER — ONDANSETRON 8 MG/1
8 TABLET, FILM COATED ORAL
Status: CANCELLED
Start: 2022-04-01

## 2022-03-25 RX ORDER — HEPARIN SODIUM (PORCINE) LOCK FLUSH IV SOLN 100 UNIT/ML 100 UNIT/ML
5 SOLUTION INTRAVENOUS
Status: CANCELLED | OUTPATIENT
Start: 2022-04-01

## 2022-03-25 RX ORDER — HEPARIN SODIUM,PORCINE 10 UNIT/ML
5 VIAL (ML) INTRAVENOUS
Status: DISCONTINUED | OUTPATIENT
Start: 2022-03-25 | End: 2022-03-25 | Stop reason: HOSPADM

## 2022-03-25 RX ORDER — MORPHINE SULFATE 2 MG/ML
2 INJECTION, SOLUTION INTRAMUSCULAR; INTRAVENOUS
Status: CANCELLED
Start: 2022-04-01

## 2022-03-25 RX ADMIN — MORPHINE SULFATE 2 MG: 2 INJECTION, SOLUTION INTRAMUSCULAR; INTRAVENOUS at 14:44

## 2022-03-25 RX ADMIN — MORPHINE SULFATE 2 MG: 2 INJECTION, SOLUTION INTRAMUSCULAR; INTRAVENOUS at 16:41

## 2022-03-25 RX ADMIN — DEXTROSE AND SODIUM CHLORIDE 1000 ML: 5; 450 INJECTION, SOLUTION INTRAVENOUS at 14:41

## 2022-03-25 RX ADMIN — MORPHINE SULFATE 2 MG: 2 INJECTION, SOLUTION INTRAMUSCULAR; INTRAVENOUS at 15:45

## 2022-03-25 RX ADMIN — Medication 5 ML: at 16:47

## 2022-03-25 NOTE — PROGRESS NOTES
Nursing Note  Jennifer Cervantes [facility] for:   Chief Complaint   Patient presents with     Infusion     IVF/pain meds     During today's [facility] appointment, orders from Dr. Duncan were completed.  Frequency: as needed    Progress note:  Patient identification verified by name and date of birth.  Assessment completed.  Vitals recorded in Doc Flowsheets.  Patient was provided with education regarding medication/procedure and possible side effects.  Patient verbalized understanding.     present during visit today: Not Applicable.    Treatment Conditions: Non-applicable.    Premedications: were not ordered.    Drug Waste Record: No    Infusion length and rate:  infusion given over approximately 2 hours    Labs: were not ordered for this appointment.    Vascular access: port accessed today.    Is the next appt scheduled? N/A, as needed  Asymptomatic COVID test completed? no    Post Infusion Assessment:  Patient tolerated infusion without incident.     Discharge Plan:   Follow up plan of care with: ordering provider as scheduled.  Discharge instructions were reviewed with patient.  Patient/representative verbalized understanding of discharge instructions and all questions answered.  Patient discharged from [facility] in stable condition.    Yina Ford RN    Administrations This Visit     dextrose 5% and 0.45% NaCl BOLUS     Admin Date  03/25/2022 Action  New Bag Dose  1,000 mL Rate  500 mL/hr Route  Intravenous Administered By  Yina Ford RN          heparin 100 UNIT/ML injection 5 mL     Admin Date  03/25/2022 Action  Given Dose  5 mL Route  Intracatheter Administered By  Yina Ford RN          morphine (PF) injection 2 mg     Admin Date  03/25/2022 Action  Given Dose  2 mg Route  Intravenous Administered By  Yina Ford RN           Admin Date  03/25/2022 Action  Given Dose  2 mg  Route  Intravenous Administered By  Yina Ford, RN           Admin Date  03/25/2022 Action  Given Dose  2 mg Route  Intravenous Administered By  Yina Ford, RN              /85 (BP Location: Left arm, Patient Position: Semi-Short's)   Pulse 114   Temp 98.3  F (36.8  C) (Oral)   Resp 18   SpO2 92%

## 2022-03-25 NOTE — TELEPHONE ENCOUNTER
Pt called in to triage requesting IVF pain meds for lower back pain rated 9/10 x2 days. Stated last took prn oxycodone and MS contin at 6 a.m. this morning without relief. Denied any fevers, chills, cough, sob, chest pain, numbness or tingling or other symptoms not typical of sickle cell pain.   Pt's last infusion was 3/21/22, last clinic visit 3/21 (Dr. Duncan) with follow-up not scheduled.    Patient states does need ride and it will take 20-25 min long to get to cancer clinic.     Pt denied being out of home medications and needing any refills today.     Pt qualifies for sickle cell standing order protocol.    If you do not hear from the infusion center by 2pm then you will not be able to get in for an infusion today. ED evaluation if sx worsen and/or you experience  any fevers, chills, cough, sob, chest pain, numbness or tingling or other symptoms not typical of sickle cell pain.     Added to Infusion Wait List.    [facility] had cancellation at 230.  Offered apt to Jennifer who confirmed she can come to apt.  Requested assistance with transportation from .  IB sent to scheduling.  Charge nurse in [facility] updated.    Routed to Dr. Duncan and Arely Krueger, JOECC

## 2022-03-25 NOTE — PROGRESS NOTES
Social Work Note: Telephone Call  Oncology Clinic     Data/Intervention:  Patient Name:  Jennifer Cervantes  /Age: 2000, 22 years old     Call From: [facility]        Reason for Call:  Transportation     Assessment:   called [employer] ([phone]) to arrange ride. [employer] ([phone]) arranged  for patient from home with will call return ride.  Patient will need to call when ready for return ride home ([phone]).      Plan:  Patient is aware of the transportation plan.  available to assist with any other needs.      CARLOS Chavez,[facility]  Hematology/Oncology Social Worker  Phone:[phone] Pager: [phone]

## 2022-03-25 NOTE — LETTER
3/25/2022         RE: Jennifer Cervantes  [address]  [place] [address]        Dear Colleague,    Thank you for referring your patient, Jennifer Cervantes, to the [facility]. Please see a copy of my visit note below.    Nursing Note  Jennifer Cervantes [facility] for:   Chief Complaint   Patient presents with     Infusion     IVF/pain meds     During today's [facility] appointment, orders from Dr. Duncan were completed.  Frequency: as needed    Progress note:  Patient identification verified by name and date of birth.  Assessment completed.  Vitals recorded in Doc Flowsheets.  Patient was provided with education regarding medication/procedure and possible side effects.  Patient verbalized understanding.     present during visit today: Not Applicable.    Treatment Conditions: Non-applicable.    Premedications: were not ordered.    Drug Waste Record: No    Infusion length and rate:  infusion given over approximately 2 hours    Labs: were not ordered for this appointment.    Vascular access: port accessed today.    Is the next appt scheduled? N/A, as needed  Asymptomatic COVID test completed? no    Post Infusion Assessment:  Patient tolerated infusion without incident.     Discharge Plan:   Follow up plan of care with: ordering provider as scheduled.  Discharge instructions were reviewed with patient.  Patient/representative verbalized understanding of discharge instructions and all questions answered.  Patient discharged [facility] in stable condition.    Yina Ford RN    Administrations This Visit     dextrose 5% and 0.45% NaCl BOLUS     Admin Date  03/25/2022 Action  New Bag Dose  1,000 mL Rate  500 mL/hr Route  Intravenous Administered By  Yina Ford RN          heparin 100 UNIT/ML injection 5 mL     Admin Date  03/25/2022 Action  Given  Dose  5 mL Route  Intracatheter Administered By  Yina Ford, RN          morphine (PF) injection 2 mg     Admin Date  03/25/2022 Action  Given Dose  2 mg Route  Intravenous Administered By  Yina Ford RN           Admin Date  03/25/2022 Action  Given Dose  2 mg Route  Intravenous Administered By  Yina Ford RN           Admin Date  03/25/2022 Action  Given Dose  2 mg Route  Intravenous Administered By  Yina Ford, JOE              /85 (BP Location: Left arm, Patient Position: Semi-Short's)   Pulse 114   Temp 98.3  F (36.8  C) (Oral)   Resp 18   SpO2 92%           Again, thank you for allowing me to participate in the care of your patient.      Sincerely,    [facility] [place]

## 2022-03-25 NOTE — PROGRESS NOTES
Outpatient IR Referral    Patient is a 23 y/o female with a PMH of hemiplegia and hemiparesis following cerebral infarction, cognitive developmental delay sickle cell pain crisis, migraine headaches, chronic pain, asthma, . Patient had left chest port placed 4/21/21, limited access. Patient has reported ongoing port pain was asked for a new port by Dr Duncan, IR recommended port check, to note patient has a contrast allergy of hives, breathing difficulties and a low severity allergy to gadolinium. She was scheduled for a port check with CO2 but was deferred to do an admission. Per Dr. Duncan she is still reporting a lot of discomfort with port accessing, even if it does get into place. He would like her to be set up an outpatient visit with her to discuss the possible causes and risks/benefits of port relocation.       IR has been asked to biopsy to consult for left port check. IR recommends left port check to assess left port, access, forward care, and expectations.     Procedure placed.    Primary team Dr. Duncan made aware of IR recommendations via epic messaging     EITAN Cuadra CNP  Interventional Radiology   IR on-call pager: [phone]

## 2022-03-26 LAB — SARS-COV-2 RNA RESP QL NAA+PROBE: NEGATIVE

## 2022-03-28 ENCOUNTER — PATIENT OUTREACH (OUTPATIENT)
Dept: CARE COORDINATION | Facility: CLINIC | Age: 22
End: 2022-03-28
Payer: COMMERCIAL

## 2022-03-28 ENCOUNTER — INFUSION THERAPY VISIT (OUTPATIENT)
Dept: TRANSPLANT | Facility: CLINIC | Age: 22
End: 2022-03-28
Attending: PEDIATRICS
Payer: COMMERCIAL

## 2022-03-28 ENCOUNTER — TELEPHONE (OUTPATIENT)
Dept: ONCOLOGY | Facility: CLINIC | Age: 22
End: 2022-03-28

## 2022-03-28 ENCOUNTER — HOME INFUSION (PRE-WILLOW HOME INFUSION) (OUTPATIENT)
Dept: PHARMACY | Facility: CLINIC | Age: 22
End: 2022-03-28

## 2022-03-28 ENCOUNTER — TELEPHONE (OUTPATIENT)
Dept: ONCOLOGY | Facility: CLINIC | Age: 22
End: 2022-03-28
Payer: COMMERCIAL

## 2022-03-28 VITALS
TEMPERATURE: 98.3 F | OXYGEN SATURATION: 93 % | RESPIRATION RATE: 20 BRPM | HEART RATE: 110 BPM | SYSTOLIC BLOOD PRESSURE: 130 MMHG | DIASTOLIC BLOOD PRESSURE: 85 MMHG

## 2022-03-28 DIAGNOSIS — E83.111 IRON OVERLOAD DUE TO REPEATED RED BLOOD CELL TRANSFUSIONS: ICD-10-CM

## 2022-03-28 DIAGNOSIS — D57.1 SICKLE CELL DISEASE WITHOUT CRISIS (H): ICD-10-CM

## 2022-03-28 DIAGNOSIS — D57.00 SICKLE CELL PAIN CRISIS (H): ICD-10-CM

## 2022-03-28 DIAGNOSIS — H53.9 VISION CHANGES: Primary | ICD-10-CM

## 2022-03-28 DIAGNOSIS — G81.10 SPASTIC HEMIPLEGIA, UNSPECIFIED ETIOLOGY, UNSPECIFIED LATERALITY (H): Primary | ICD-10-CM

## 2022-03-28 PROCEDURE — 250N000011 HC RX IP 250 OP 636: Performed by: PEDIATRICS

## 2022-03-28 PROCEDURE — 96361 HYDRATE IV INFUSION ADD-ON: CPT

## 2022-03-28 PROCEDURE — 96374 THER/PROPH/DIAG INJ IV PUSH: CPT

## 2022-03-28 PROCEDURE — 258N000003 HC RX IP 258 OP 636: Performed by: PEDIATRICS

## 2022-03-28 RX ORDER — ONDANSETRON 8 MG/1
8 TABLET, FILM COATED ORAL
Status: CANCELLED
Start: 2022-04-01

## 2022-03-28 RX ORDER — DIPHENHYDRAMINE HCL 25 MG
25 CAPSULE ORAL
Status: CANCELLED
Start: 2022-04-01

## 2022-03-28 RX ORDER — MORPHINE SULFATE 2 MG/ML
2 INJECTION, SOLUTION INTRAMUSCULAR; INTRAVENOUS
Status: CANCELLED
Start: 2022-04-01

## 2022-03-28 RX ORDER — HEPARIN SODIUM (PORCINE) LOCK FLUSH IV SOLN 100 UNIT/ML 100 UNIT/ML
5 SOLUTION INTRAVENOUS
Status: CANCELLED | OUTPATIENT
Start: 2022-04-01

## 2022-03-28 RX ORDER — HEPARIN SODIUM,PORCINE 10 UNIT/ML
5 VIAL (ML) INTRAVENOUS
Status: CANCELLED | OUTPATIENT
Start: 2022-04-01

## 2022-03-28 RX ORDER — MORPHINE SULFATE 2 MG/ML
2 INJECTION, SOLUTION INTRAMUSCULAR; INTRAVENOUS
Status: DISCONTINUED | OUTPATIENT
Start: 2022-03-28 | End: 2022-03-28 | Stop reason: HOSPADM

## 2022-03-28 RX ADMIN — MORPHINE SULFATE 2 MG: 2 INJECTION, SOLUTION INTRAMUSCULAR; INTRAVENOUS at 11:48

## 2022-03-28 RX ADMIN — MORPHINE SULFATE 2 MG: 2 INJECTION, SOLUTION INTRAMUSCULAR; INTRAVENOUS at 13:44

## 2022-03-28 RX ADMIN — MORPHINE SULFATE 2 MG: 2 INJECTION, SOLUTION INTRAMUSCULAR; INTRAVENOUS at 12:50

## 2022-03-28 RX ADMIN — DEXTROSE AND SODIUM CHLORIDE 1000 ML: 5; 450 INJECTION, SOLUTION INTRAVENOUS at 11:47

## 2022-03-28 ASSESSMENT — PAIN SCALES - GENERAL: PAINLEVEL: EXTREME PAIN (9)

## 2022-03-28 NOTE — NURSING NOTE
"Oncology Rooming Note    March 28, 2022 11:56 AM   Jennifer Cervantes is a 22 year old female who presents for:    Chief Complaint   Patient presents with     Infusion     Add-on infusion; hx sickle cell     Initial Vitals: /85 (BP Location: Left arm, Patient Position: Sitting)   Pulse 110   Temp 98.3  F (36.8  C) (Oral)   Resp 20   SpO2 93%  Estimated body mass index is 29.16 kg/m  as calculated from the following:    Height as of 3/20/22: 1.626 m (5' 4\").    Weight as of 3/21/22: 77.1 kg (169 lb 14.4 oz). There is no height or weight on file to calculate BSA.  Extreme Pain (9) Comment: Data Unavailable   No LMP recorded. Patient has had an injection.  Allergies reviewed: Yes  Medications reviewed: Yes    Medications: Medication refills not needed today.  Pharmacy name entered into EPIC: [place], [place] - [address] [place] [address] [phone]    Clinical concerns: VSS. Pt reports generalized 9/10 pain, but otherwise reports feeling well.       Jaida Kelly RN              "

## 2022-03-28 NOTE — PROGRESS NOTES
Social Work Note: Telephone Call  Oncology Clinic     Data/Intervention:  Patient Name:  Jennifer Cervantes  /Age: 2000, 22 years old     Call From: [facility] Triage        Reason for Call:  Transportation     Assessment:   called [employer] to arrange ride through patient's insurance. [employer] arranged  for patient from home with [employer] ([phone]).  Patient will need to call when ready for return ride home ([phone]).      Plan:  Patient is aware of the transportation plan.  available to assist with any other needs.      CARLOS Chavez,UDAY  Hematology/Oncology Social Worker  Phone:[phone] Pager: [phone]

## 2022-03-28 NOTE — TELEPHONE ENCOUNTER
[facility] Cancer Clinic Telephone Triage Note    The following symptoms were reported:   typical  Description:            Onset:  Saturday around midnight   Location: all over  Character: sharp           Intensity: 9/10    Accompanying Signs & Symptoms:  none    Chest Pain:  denies     Shortness of Breath:  denies     Fever:  denies     Chills:  denies   Cough/sore throat:  denies  Other:  Need transportation    Therapies Tried and outcome: took oxycodone and MS contin around 6am this morning    Improved by: nothing    The following provider was consulted:    Meets protocol per therapy plan      The following advice/orders were given, and/or interventions recommended:    As of 0710 Pending appt availability   As of 0725 appt available with BMT at 12:00pm.     0729 Scheduling request sent    [facility]14 [facility] paged to assist with transportation    Patient instructions and/or follow up:       If you do not hear from the infusion center by 2pm then you will not be able to get in for an infusion today. If symptoms worsen while waiting for call back, and/or you experience fever, chills, SOB, chest pain, cough, n/v, dizziness, numbness, swelling, discoloration of extremities, then seek emergency evaluation in Emergency Department.

## 2022-03-28 NOTE — TELEPHONE ENCOUNTER
Pt is calling because she has noticed a change in her vision over (unmentioned amount of time).   Pt reports that near vision has worsened especially with reading, writing, and using her phone.  Requesting Dr. Duncan place referral to eye doctor for her.    Routed to Dr. Duncan and Arely Krueger, JOECC

## 2022-03-28 NOTE — LETTER
3/28/2022         RE: Jennifer Cervantes  [address]  [facility] [address]        Dear Colleague,    Thank you for referring your patient, Jennifer Cervantes, to the [facility] BLOOD AND MARROW TRANSPLANT PROGRAM [place]. Please see a copy of my visit note below.    Infusion Nursing Note:  Jennifer Cervantes presents today for add-on IVF/pain med infusion.    Patient seen by provider today: No   present during visit today: Not Applicable.    Note: VSS. Meds and allergies reviewed. Pt reports generalized 9/10 pain.      Intravenous Access:  Implanted Port.    Treatment Conditions:  Pt received 1L IVF and 2mg IV morphine x3 per therapy plan. Pain decreased to 6/10 post fluids/pain meds.      Post Infusion Assessment:  Patient tolerated infusion without incident.       Discharge Plan:   Patient discharged in stable condition accompanied by: self.      Jaida Kelly RN                          Again, thank you for allowing me to participate in the care of your patient.        Sincerely,        [facility]

## 2022-03-29 DIAGNOSIS — E83.111 IRON OVERLOAD DUE TO REPEATED RED BLOOD CELL TRANSFUSIONS: ICD-10-CM

## 2022-03-29 NOTE — PROGRESS NOTES
Jennifer reported to me that she wears glasses and knows that she has bad vision, per her report, but it seems to have been worsening maybe even over a year.  She cannot really give a specific timeframe.  Her last ophthalmology appointment actually looks to be this date in 2021 so she would be due for an annual checkup anyways.  However, given that she is on chelation and vision changes can be a side effect, I am stopping all of her chelation, both the Desferal and the Jadenu, for now.  She and I discussed that it a bit difficult to know whether they would be contributing or not but I do not want to take any chances.  We also discussed that this may mean that we hold off on any Desferal going forward, even though that means her body is significantly iron overloaded, because her vision is really important.  She is in agreement with the current plan.  We will get in touch with [facility] to let them know that she will be stopping the desk for all.  The best for all got started yesterday and has about 27 hours left of this infusion when I talk to her today.  She will stop that infusion now rather than letting the whole thing run.        Eric Duncan MD  Hematologist  Division of Hematology, Oncology, and Transplantation  [facility]  [facility]  Pager: [phone]

## 2022-03-29 NOTE — PROGRESS NOTES
Spoke with [facility] pharmacist and ordered to discontinue the Desferal per Dr. Duncan.  They will continue with monthly port care for now.  Dr. Duncan spoke with the patient today and let her know about the Jadenu and Desferal.    Arely Krueger, RN  RN Care Coordinator  [phone] clinic main line

## 2022-03-30 ENCOUNTER — HOSPITAL ENCOUNTER (EMERGENCY)
Facility: CLINIC | Age: 22
Discharge: HOME | End: 2022-03-30
Attending: EMERGENCY MEDICINE | Admitting: EMERGENCY MEDICINE
Payer: COMMERCIAL

## 2022-03-30 VITALS
OXYGEN SATURATION: 98 % | SYSTOLIC BLOOD PRESSURE: 137 MMHG | WEIGHT: 170 LBS | RESPIRATION RATE: 18 BRPM | HEART RATE: 102 BPM | BODY MASS INDEX: 29.18 KG/M2 | DIASTOLIC BLOOD PRESSURE: 76 MMHG | TEMPERATURE: 98.2 F

## 2022-03-30 DIAGNOSIS — D57.00 SICKLE CELL PAIN CRISIS (H): ICD-10-CM

## 2022-03-30 DIAGNOSIS — D57.1 SICKLE CELL ANEMIA (H): Primary | ICD-10-CM

## 2022-03-30 LAB
ALBUMIN SERPL-MCNC: 3.9 G/DL (ref 3.4–5)
ALBUMIN UR-MCNC: NEGATIVE MG/DL
ALP SERPL-CCNC: 83 U/L (ref 40–150)
ALT SERPL W P-5'-P-CCNC: 71 U/L (ref 0–50)
ANION GAP SERPL CALCULATED.3IONS-SCNC: 8 MMOL/L (ref 3–14)
APPEARANCE UR: CLEAR
AST SERPL W P-5'-P-CCNC: 85 U/L (ref 0–45)
BASOPHILS # BLD AUTO: 0.2 10E3/UL (ref 0–0.2)
BASOPHILS NFR BLD AUTO: 2 %
BILIRUB SERPL-MCNC: 3.4 MG/DL (ref 0.2–1.3)
BILIRUB UR QL STRIP: NEGATIVE
BUN SERPL-MCNC: 8 MG/DL (ref 7–30)
CALCIUM SERPL-MCNC: 8.6 MG/DL (ref 8.5–10.1)
CHLORIDE BLD-SCNC: 110 MMOL/L (ref 94–109)
CO2 SERPL-SCNC: 20 MMOL/L (ref 20–32)
COLOR UR AUTO: ABNORMAL
CREAT SERPL-MCNC: 0.43 MG/DL (ref 0.52–1.04)
EOSINOPHIL # BLD AUTO: 0.8 10E3/UL (ref 0–0.7)
EOSINOPHIL NFR BLD AUTO: 6 %
ERYTHROCYTE [DISTWIDTH] IN BLOOD BY AUTOMATED COUNT: 25.5 % (ref 10–15)
GFR SERPL CREATININE-BSD FRML MDRD: >90 ML/MIN/1.73M2
GLUCOSE BLD-MCNC: 101 MG/DL (ref 70–99)
GLUCOSE UR STRIP-MCNC: NEGATIVE MG/DL
HCG UR QL: NEGATIVE
HCT VFR BLD AUTO: 20.3 % (ref 35–47)
HGB BLD-MCNC: 7.4 G/DL (ref 11.7–15.7)
HGB UR QL STRIP: NEGATIVE
IMM GRANULOCYTES # BLD: 0.1 10E3/UL
IMM GRANULOCYTES NFR BLD: 1 %
KETONES UR STRIP-MCNC: NEGATIVE MG/DL
LEUKOCYTE ESTERASE UR QL STRIP: NEGATIVE
LYMPHOCYTES # BLD AUTO: 2.3 10E3/UL (ref 0.8–5.3)
LYMPHOCYTES NFR BLD AUTO: 18 %
MCH RBC QN AUTO: 32.5 PG (ref 26.5–33)
MCHC RBC AUTO-ENTMCNC: 36.5 G/DL (ref 31.5–36.5)
MCV RBC AUTO: 89 FL (ref 78–100)
MONOCYTES # BLD AUTO: 1.1 10E3/UL (ref 0–1.3)
MONOCYTES NFR BLD AUTO: 9 %
MUCOUS THREADS #/AREA URNS LPF: PRESENT /LPF
NEUTROPHILS # BLD AUTO: 7.9 10E3/UL (ref 1.6–8.3)
NEUTROPHILS NFR BLD AUTO: 64 %
NITRATE UR QL: NEGATIVE
NRBC # BLD AUTO: 0.7 10E3/UL
NRBC BLD AUTO-RTO: 5 /100
PH UR STRIP: 5.5 [PH] (ref 5–7)
PLATELET # BLD AUTO: 317 10E3/UL (ref 150–450)
POTASSIUM BLD-SCNC: 3.8 MMOL/L (ref 3.4–5.3)
PROT SERPL-MCNC: 7.9 G/DL (ref 6.8–8.8)
RBC # BLD AUTO: 2.28 10E6/UL (ref 3.8–5.2)
RBC URINE: 1 /HPF
RETICS # AUTO: 0.42 10E6/UL (ref 0.03–0.1)
RETICS/RBC NFR AUTO: 18.4 % (ref 0.5–2)
SODIUM SERPL-SCNC: 138 MMOL/L (ref 133–144)
SP GR UR STRIP: 1.01 (ref 1–1.03)
UROBILINOGEN UR STRIP-MCNC: NORMAL MG/DL
WBC # BLD AUTO: 12.3 10E3/UL (ref 4–11)
WBC URINE: 1 /HPF

## 2022-03-30 PROCEDURE — 81025 URINE PREGNANCY TEST: CPT | Performed by: EMERGENCY MEDICINE

## 2022-03-30 PROCEDURE — 99284 EMERGENCY DEPT VISIT MOD MDM: CPT | Mod: 25 | Performed by: EMERGENCY MEDICINE

## 2022-03-30 PROCEDURE — 85004 AUTOMATED DIFF WBC COUNT: CPT | Performed by: EMERGENCY MEDICINE

## 2022-03-30 PROCEDURE — 250N000011 HC RX IP 250 OP 636: Performed by: EMERGENCY MEDICINE

## 2022-03-30 PROCEDURE — 96361 HYDRATE IV INFUSION ADD-ON: CPT | Performed by: EMERGENCY MEDICINE

## 2022-03-30 PROCEDURE — 96375 TX/PRO/DX INJ NEW DRUG ADDON: CPT | Performed by: EMERGENCY MEDICINE

## 2022-03-30 PROCEDURE — 258N000003 HC RX IP 258 OP 636: Performed by: EMERGENCY MEDICINE

## 2022-03-30 PROCEDURE — 36415 COLL VENOUS BLD VENIPUNCTURE: CPT | Performed by: EMERGENCY MEDICINE

## 2022-03-30 PROCEDURE — 85045 AUTOMATED RETICULOCYTE COUNT: CPT | Performed by: EMERGENCY MEDICINE

## 2022-03-30 PROCEDURE — 99284 EMERGENCY DEPT VISIT MOD MDM: CPT | Performed by: EMERGENCY MEDICINE

## 2022-03-30 PROCEDURE — 250N000013 HC RX MED GY IP 250 OP 250 PS 637: Performed by: EMERGENCY MEDICINE

## 2022-03-30 PROCEDURE — 250N000009 HC RX 250: Performed by: EMERGENCY MEDICINE

## 2022-03-30 PROCEDURE — 80053 COMPREHEN METABOLIC PANEL: CPT | Performed by: EMERGENCY MEDICINE

## 2022-03-30 PROCEDURE — 96374 THER/PROPH/DIAG INJ IV PUSH: CPT | Performed by: EMERGENCY MEDICINE

## 2022-03-30 PROCEDURE — 81001 URINALYSIS AUTO W/SCOPE: CPT | Performed by: EMERGENCY MEDICINE

## 2022-03-30 RX ORDER — SODIUM CHLORIDE, SODIUM LACTATE, POTASSIUM CHLORIDE, CALCIUM CHLORIDE 600; 310; 30; 20 MG/100ML; MG/100ML; MG/100ML; MG/100ML
125 INJECTION, SOLUTION INTRAVENOUS CONTINUOUS
Status: DISCONTINUED | OUTPATIENT
Start: 2022-03-30 | End: 2022-03-30 | Stop reason: HOSPADM

## 2022-03-30 RX ORDER — HEPARIN SODIUM (PORCINE) LOCK FLUSH IV SOLN 100 UNIT/ML 100 UNIT/ML
5-10 SOLUTION INTRAVENOUS
Status: DISCONTINUED | OUTPATIENT
Start: 2022-03-30 | End: 2022-03-30 | Stop reason: HOSPADM

## 2022-03-30 RX ORDER — KETOROLAC TROMETHAMINE 15 MG/ML
15 INJECTION, SOLUTION INTRAMUSCULAR; INTRAVENOUS ONCE
Status: COMPLETED | OUTPATIENT
Start: 2022-03-30 | End: 2022-03-30

## 2022-03-30 RX ADMIN — Medication 5 ML: at 10:15

## 2022-03-30 RX ADMIN — SODIUM CHLORIDE, POTASSIUM CHLORIDE, SODIUM LACTATE AND CALCIUM CHLORIDE 1000 ML: 600; 310; 30; 20 INJECTION, SOLUTION INTRAVENOUS at 07:04

## 2022-03-30 RX ADMIN — OXYCODONE HYDROCHLORIDE 15 MG: 5 TABLET ORAL at 10:09

## 2022-03-30 RX ADMIN — Medication 4 MG: at 07:03

## 2022-03-30 RX ADMIN — KETOROLAC TROMETHAMINE 15 MG: 15 INJECTION, SOLUTION INTRAMUSCULAR; INTRAVENOUS at 07:03

## 2022-03-30 ASSESSMENT — ENCOUNTER SYMPTOMS
CHILLS: 0
FEVER: 0
NECK PAIN: 0
ABDOMINAL PAIN: 0
DIFFICULTY URINATING: 0
NERVOUS/ANXIOUS: 0
BACK PAIN: 0
WEAKNESS: 0
NAUSEA: 0
VOMITING: 0
SHORTNESS OF BREATH: 0
HEADACHES: 0

## 2022-03-30 NOTE — ED PROVIDER NOTES
SeenED Provider Note  [facility]      History     Chief Complaint   Patient presents with     Sickle Cell Pain Crisis     HPI  Jennifer Cervantes is a 22 year old female with history of sickle cell disease, prior CVA, venous thrombosis, presents to the ED for evaluation of sickle cell pain crisis.  She states her pain started yesterday.  Initially relieved with her home oxycodone but has since been worsening.  The pain is located in her bilateral legs and lower back.  She states this is typical of her usual sickle cell pain.  No reported injury.  Pain feels like a sharp squeezing sensation.  She denies any chest pain, shortness of breath, fever/chills, headache, motor weakness, sensory deficit.    Past Medical History  Past Medical History:   Diagnosis Date     Anxiety      Bleeding disorder (H)      Blood clotting disorder (H)      Cerebral infarction (H) 2015     Cognitive developmental delay     low IQ. Please recognize when managing pain and planning with her     Depressive disorder      Hemiplegia and hemiparesis following cerebral infarction affecting right dominant side (H)     right hand contractures     Iron overload due to repeated red blood cell transfusions      Migraines      Multiple subsegmental pulmonary emboli without acute cor pulmonale (H) 02/01/2021     Oppositional defiant behavior      Superficial venous thrombosis of arm, right 03/25/2021     Uncomplicated asthma      Past Surgical History:   Procedure Laterality Date     AS INSERT TUNNELED CV 2 CATH W/O PORT/PUMP       CHOLECYSTECTOMY       CV RIGHT HEART CATH MEASUREMENTS RECORDED N/A 11/18/2021    Procedure: Right Heart Cath;  Surgeon: Jackson Stauffer MD;  Location:  HEART CARDIAC CATH LAB     INSERT PORT VASCULAR ACCESS Left 4/21/2021    Procedure: INSERTION, VASCULAR ACCESS PORT (NOT SURE ON SIDE UNTIL REMOVAL);  Surgeon: Rajan More MD;  [place] [facility] [place]     IR CHEST PORT PLACEMENT > 5 YRS OF AGE   4/21/2021     IR CVC NON TUNNEL LINE REMOVAL  5/6/2021     IR CVC NON TUNNEL PLACEMENT  04/07/2020     IR CVC NON TUNNEL PLACEMENT  4/30/2021     IR PORT REMOVAL LEFT  4/21/2021     REMOVE PORT VASCULAR ACCESS Left 4/21/2021    Procedure: REMOVAL, VASCULAR ACCESS PORT LEFT;  Surgeon: Rajan More MD;  Location: [facility] OR     REPAIR TENDON ELBOW Right 10/02/2019    Procedure: Right Elbow Flexor Lengthening, Flexor Pronator Slide Of Wrist and Finger, Thumb Adductor Lengthening;  Surgeon: Anai Franco MD;  Location: [facility] OR     TONSILLECTOMY Bilateral 10/02/2019    Procedure: Bilateral Tonsillectomy;  Surgeon: Farhana Guy MD;  Location: [facility] OR     ZZC BREAST REDUCTION (INCLUDES LIPO) TIER 3 Bilateral 04/23/2019     acetaminophen (TYLENOL) 325 MG tablet  albuterol (PROAIR HFA/PROVENTIL HFA/VENTOLIN HFA) 108 (90 Base) MCG/ACT inhaler  albuterol (PROVENTIL) (2.5 MG/3ML) 0.083% neb solution  aspirin (ASA) 81 MG chewable tablet  budesonide-formoterol (SYMBICORT) 160-4.5 MCG/ACT Inhaler  diphenhydrAMINE (BENADRYL) 25 MG capsule  EPINEPHrine (ANY BX GENERIC EQUIV) 0.3 MG/0.3ML injection 2-pack  Hydroxyurea 1000 MG TABS  medroxyPROGESTERone (DEPO-PROVERA) 150 MG/ML IM injection  morphine (MS CONTIN) 15 MG CR tablet  ondansetron (ZOFRAN) 8 MG tablet  oxyCODONE IR (ROXICODONE) 15 MG tablet      Allergies   Allergen Reactions     Contrast Dye      Hives and breathing issues     Fish-Derived Products Hives     Seafood Hives     Diagnostic X-Ray Materials      Gadolinium      Family History  Family History   Problem Relation Age of Onset     Sickle Cell Trait Mother      Hypertension Mother      Asthma Mother      Sickle Cell Trait Father      Social History   Social History     Tobacco Use     Smoking status: Never Smoker     Smokeless tobacco: Never Used   Substance Use Topics     Alcohol use: Not Currently     Alcohol/week: 0.0 standard drinks     Drug use: Never      Past medical history, past surgical  history, medications, allergies, family history, and social history were reviewed with the patient. No additional pertinent items.       Review of Systems   Constitutional: Negative for chills and fever.   Eyes: Negative for visual disturbance.   Respiratory: Negative for shortness of breath.    Cardiovascular: Negative for chest pain.   Gastrointestinal: Negative for abdominal pain, nausea and vomiting.   Endocrine: Negative for polyuria.   Genitourinary: Negative for difficulty urinating.   Musculoskeletal: Negative for back pain and neck pain.        Bilateral leg pain.  Back pain.   Skin: Negative for rash.   Allergic/Immunologic: Negative for immunocompromised state.   Neurological: Negative for weakness and headaches.   Psychiatric/Behavioral: The patient is not nervous/anxious.      A complete review of systems was performed with pertinent positives and negatives noted in the HPI, and all other systems negative.    Physical Exam   BP: (!) 146/73  Pulse: 116  Temp: 98  F (36.7  C)  Resp: 18  Weight: 77.1 kg (170 lb)  SpO2: 98 %  Physical Exam  Vitals and nursing note reviewed.   Constitutional:       General: She is not in acute distress.     Appearance: Normal appearance. She is not ill-appearing or toxic-appearing.   HENT:      Head: Normocephalic and atraumatic.      Nose: Nose normal.      Mouth/Throat:      Mouth: Mucous membranes are moist.   Eyes:      Pupils: Pupils are equal, round, and reactive to light.   Cardiovascular:      Rate and Rhythm: Normal rate.      Pulses: Normal pulses.      Heart sounds: Normal heart sounds.   Pulmonary:      Effort: Pulmonary effort is normal. No respiratory distress.      Breath sounds: Normal breath sounds.   Abdominal:      General: Abdomen is flat. There is no distension.   Musculoskeletal:         General: No swelling or deformity. Normal range of motion.      Cervical back: Normal range of motion. No rigidity.      Comments: Reported pain of the lower  extremities bilaterally and the low back.  No specific localizable bony tenderness.  No skin changes.  No swelling, palpable cords, or other signs of DVT.  No midline tenderness or deformities of the spine.   Skin:     General: Skin is warm.      Capillary Refill: Capillary refill takes less than 2 seconds.   Neurological:      Mental Status: She is alert and oriented to person, place, and time.   Psychiatric:         Mood and Affect: Mood normal.         ED Course          No results found for any visits on 03/30/22.  Medications   ketorolac (TORADOL) injection 15 mg (has no administration in time range)   ketamine (KETALAR) injection 4 mg (has no administration in time range)        Assessments & Plan (with Medical Decision Making)   Patient presents to the ED for evaluation of bilateral lower extremity and low back pain which she described as similar to previous sickle cell pain crisis.  Denies chest pain or shortness of breath to suggest acute chest syndrome.  No neuro symptoms, normal neuro exam, low suspicion for CVA.    Plan for basic labs with CBC, CMP, reticulocyte count.  We will give her pain medications per her protocol including IV Toradol, IV ketamine, IV fluids.    We will sign out to morning provider plan to follow-up on labs, reassess after pain protocol and disposition accordingly.      I have reviewed the nursing notes. I have reviewed the findings, diagnosis, plan and need for follow up with the patient.    New Prescriptions    No medications on file       Final diagnoses:   Sickle cell pain crisis (H)       --  Raul Diaz DO  [facility] ED EMERGENCY DEPARTMENT  3/30/2022     Raul Diaz DO  03/30/22 0650

## 2022-03-30 NOTE — DISCHARGE INSTRUCTIONS
Please make an appointment to follow up with Hematology Oncology Clinic (phone: [phone]).      Continue to drink at least 8 glasses of water a day to stay well hydrated.      If you have worsening symptoms including increased pain, fevers, vomiting, chest pain, shortness of breath or other concerns, return to the emergency department for re-evaluation.

## 2022-03-30 NOTE — ED TRIAGE NOTES
Patient c/o bilateral leg beginning Tuesday afternoon, she has been taking all of her home medications and they have not been successful in treating her pain.

## 2022-03-30 NOTE — ED PROVIDER NOTES
Sign out Provider: Emily  Sign out Plan: Patient is a 22-year-old female with a history of sickle cell disease, prior CVA, venous thrombosis who presents to the emergency department with pain in her legs and low back.  Currently has received Toradol and ketamine per her care plan.  Plan for reassessment for symptom control.    Reassessment: I have reviewed her labs which show no significant electrolyte abnormalities.  She does have mild elevation in ALT and AST and total bilirubin similar to prior previous.  She does have a mild leukocytosis to 12.3, hemoglobin is 7.4.  UA negative for UTI.  She did receive dose of oxycodone and is feeling improved.  Requesting to discharge to home. Will follow-up in heme/onc clinic and given close return precautions for the ED.      Disposition: Discharge           Melissa Larson MD  03/30/22 1543

## 2022-04-01 ENCOUNTER — PATIENT OUTREACH (OUTPATIENT)
Dept: CARE COORDINATION | Facility: CLINIC | Age: 22
End: 2022-04-01
Payer: COMMERCIAL

## 2022-04-01 ENCOUNTER — NURSE TRIAGE (OUTPATIENT)
Dept: ONCOLOGY | Facility: CLINIC | Age: 22
End: 2022-04-01
Payer: COMMERCIAL

## 2022-04-01 ENCOUNTER — INFUSION THERAPY VISIT (OUTPATIENT)
Dept: ONCOLOGY | Facility: CLINIC | Age: 22
End: 2022-04-01
Attending: PEDIATRICS
Payer: COMMERCIAL

## 2022-04-01 VITALS
SYSTOLIC BLOOD PRESSURE: 128 MMHG | OXYGEN SATURATION: 100 % | TEMPERATURE: 98.4 F | RESPIRATION RATE: 18 BRPM | HEART RATE: 105 BPM | DIASTOLIC BLOOD PRESSURE: 80 MMHG

## 2022-04-01 DIAGNOSIS — G81.10 SPASTIC HEMIPLEGIA, UNSPECIFIED ETIOLOGY, UNSPECIFIED LATERALITY (H): Primary | ICD-10-CM

## 2022-04-01 DIAGNOSIS — D57.00 SICKLE CELL PAIN CRISIS (H): ICD-10-CM

## 2022-04-01 PROCEDURE — 258N000003 HC RX IP 258 OP 636: Performed by: PEDIATRICS

## 2022-04-01 PROCEDURE — 250N000011 HC RX IP 250 OP 636: Performed by: PEDIATRICS

## 2022-04-01 PROCEDURE — 96374 THER/PROPH/DIAG INJ IV PUSH: CPT

## 2022-04-01 PROCEDURE — 96376 TX/PRO/DX INJ SAME DRUG ADON: CPT

## 2022-04-01 PROCEDURE — 96361 HYDRATE IV INFUSION ADD-ON: CPT

## 2022-04-01 RX ORDER — MORPHINE SULFATE 2 MG/ML
2 INJECTION, SOLUTION INTRAMUSCULAR; INTRAVENOUS
Status: DISCONTINUED | OUTPATIENT
Start: 2022-04-01 | End: 2022-04-01 | Stop reason: HOSPADM

## 2022-04-01 RX ORDER — MORPHINE SULFATE 2 MG/ML
2 INJECTION, SOLUTION INTRAMUSCULAR; INTRAVENOUS
Status: CANCELLED
Start: 2022-07-01

## 2022-04-01 RX ORDER — ONDANSETRON 8 MG/1
8 TABLET, FILM COATED ORAL
Status: CANCELLED
Start: 2022-07-01

## 2022-04-01 RX ORDER — HEPARIN SODIUM (PORCINE) LOCK FLUSH IV SOLN 100 UNIT/ML 100 UNIT/ML
5 SOLUTION INTRAVENOUS
Status: CANCELLED | OUTPATIENT
Start: 2022-07-01

## 2022-04-01 RX ORDER — HEPARIN SODIUM (PORCINE) LOCK FLUSH IV SOLN 100 UNIT/ML 100 UNIT/ML
5 SOLUTION INTRAVENOUS
Status: DISCONTINUED | OUTPATIENT
Start: 2022-04-01 | End: 2022-04-01 | Stop reason: HOSPADM

## 2022-04-01 RX ORDER — DIPHENHYDRAMINE HCL 25 MG
25 CAPSULE ORAL
Status: CANCELLED
Start: 2022-07-01

## 2022-04-01 RX ORDER — HEPARIN SODIUM,PORCINE 10 UNIT/ML
5 VIAL (ML) INTRAVENOUS
Status: CANCELLED | OUTPATIENT
Start: 2022-07-01

## 2022-04-01 RX ADMIN — DEXTROSE AND SODIUM CHLORIDE 1000 ML: 5; 450 INJECTION, SOLUTION INTRAVENOUS at 13:24

## 2022-04-01 RX ADMIN — MORPHINE SULFATE 2 MG: 2 INJECTION, SOLUTION INTRAMUSCULAR; INTRAVENOUS at 13:31

## 2022-04-01 RX ADMIN — MORPHINE SULFATE 2 MG: 2 INJECTION, SOLUTION INTRAMUSCULAR; INTRAVENOUS at 15:32

## 2022-04-01 RX ADMIN — MORPHINE SULFATE 2 MG: 2 INJECTION, SOLUTION INTRAMUSCULAR; INTRAVENOUS at 14:32

## 2022-04-01 RX ADMIN — Medication 5 ML: at 16:00

## 2022-04-01 NOTE — TELEPHONE ENCOUNTER
[facility] Clinic[facility]    Pt called in to triage requesting IVF pain meds for lower back pain rated 8/10 x 1 days. Stated last took oxy and ms contin this morning without relief. Denied any fevers, chills, cough, sob, chest pain or other symptoms.  Denies confusion, numbness, paralysis, vomiting, headache, vision issues, difficulty walking and/or talking. Pt's last infusion was 3/28/22, last clinic visit [facility] 3/21/22 with follow-up on 4/22 with Patricia Mantilla. Patient states they do need a ride and it will take 60 minutes to get to cancer clinic. Pt denied being out of home medications and needing any refills today. Pt qualifies for sickle cell standing order protocol.  Placed on waiting list.    Routing to Dr. Duncan and Arely Krueger    Follow up:  1PM today in [facility] For IVF and pain meds, SW will set up ride and IB sent to scheduling.

## 2022-04-01 NOTE — PATIENT INSTRUCTIONS
Contact Numbers  [facility]: [phone] (for symptom and scheduling needs)    Please call the [facility] Triage line if you experience a temperature greater than or equal to 100.4, shaking chills, have uncontrolled nausea, vomiting and/or diarrhea, dizziness, shortness of breath, chest pain, bleeding, unexplained bruising, or if you have any other new/concerning symptoms, questions or concerns.     If you are having any concerning symptoms or wish to speak to a provider before your next infusion visit, please call your care coordinator or triage to notify them so we can adequately serve you.     If you need a refill on a narcotic prescription or other medication, please call triage before your infusion appointment.          April 2022 Sunday Monday Tuesday Wednesday Thursday Friday Saturday                            1    ONC INFUSION 2 HR (120 MIN)   1:00 PM   (120 min.)    ONC INFUSION NURSE   [facility] 2       3     4     5     6     7    [facility] NEW RETINA   8:15 AM   (15 min.)   Joelle Aldana MD   [facility] 8     9       10     11     12     13     14     15    RETURN  10:45 AM   (60 min.)   Katherine Pierce MD   [facility] 16       17     18     19     20    US VENOUS  12:15 PM   (30 min.)   [address] [facility] 21     22    RETURN  12:00 PM   (45 min.)   Patricia Mantilla CNP   [facility] 23       24     25     26     27     28     29     30                 May 2022      [place] Monday Tuesday Wednesday Thursday Friday Saturday   1     2     3     4     5     6     7       8     9     10     11     12     13     14       15     16     17     18     19     20     21       22     23     24     25     26     27     28       29     30     31                                         Lab Results:  No results found for this or any previous  visit (from the past 12 hour(s)).

## 2022-04-01 NOTE — PROGRESS NOTES
Social Work Note: Telephone Call  Oncology Clinic     Data/Intervention:  Patient Name:  Jennifer Cervantes  /Age: 2000, 22 years old     Call From: [facility] Triage        Reason for Call:  Transportation     Assessment:   called [employer] to arrange ride through patient's insurance. [employer] arranged  for patient from home with [employer] ([phone]).  Patient will need to call when ready for return ride home ([phone]).      Plan:  Patient is aware of the transportation plan.  available to assist with any other needs.      CARLOS Chavez,UDAY  Hematology/Oncology Social Worker  Phone:[phone] Pager: [phone]

## 2022-04-01 NOTE — PROGRESS NOTES
Infusion Nursing Note:  Jennifer Cervantes presents today for IV Fluids/Pain Medications.    Patient seen by provider today: No   present during visit today: Not Applicable.    Note: Patient presents to infusion today reporting 9/10 sickle cell pain in lower back. States this is her normal sickle cell pain. Denies any fevers, chills, shortness of breath, chest pains or cough. Offers no other new concerns today. Goal pain at time of discharge is 6/10.    After 3 doses of IV morphine, patient reported pain at 5/10 at time of discharge. Patient states she feels comfortable discharging home at this time.     Intravenous Access:  Implanted Port.    Treatment Conditions:  Not Applicable.    Post Infusion Assessment:  Patient tolerated infusion without incident.  Blood return noted pre and post infusion.  Site patent and intact, free from redness, edema or discomfort.  No evidence of extravasations.  Access discontinued per protocol.     Discharge Plan:   Patient declined prescription refills.  Discharge instructions reviewed with: Patient.  Patient and/or family verbalized understanding of discharge instructions and all questions answered.  Copy of AVS given to patient.  Patient will return 4/22 for next appointment with Patricia Mantilla NP.   Patient discharged in stable condition accompanied by: self.  Departure Mode: Ambulatory.      Maritza Bautista RN

## 2022-04-04 ENCOUNTER — INFUSION THERAPY VISIT (OUTPATIENT)
Dept: TRANSPLANT | Facility: CLINIC | Age: 22
End: 2022-04-04
Attending: PEDIATRICS
Payer: COMMERCIAL

## 2022-04-04 ENCOUNTER — PATIENT OUTREACH (OUTPATIENT)
Dept: CARE COORDINATION | Facility: CLINIC | Age: 22
End: 2022-04-04

## 2022-04-04 ENCOUNTER — TELEPHONE (OUTPATIENT)
Dept: ONCOLOGY | Facility: CLINIC | Age: 22
End: 2022-04-04

## 2022-04-04 VITALS
TEMPERATURE: 98.3 F | SYSTOLIC BLOOD PRESSURE: 141 MMHG | DIASTOLIC BLOOD PRESSURE: 86 MMHG | OXYGEN SATURATION: 97 % | HEART RATE: 106 BPM | RESPIRATION RATE: 18 BRPM

## 2022-04-04 DIAGNOSIS — D57.00 SICKLE CELL PAIN CRISIS (H): ICD-10-CM

## 2022-04-04 DIAGNOSIS — G81.10 SPASTIC HEMIPLEGIA, UNSPECIFIED ETIOLOGY, UNSPECIFIED LATERALITY (H): Primary | ICD-10-CM

## 2022-04-04 PROCEDURE — 96361 HYDRATE IV INFUSION ADD-ON: CPT

## 2022-04-04 PROCEDURE — 96374 THER/PROPH/DIAG INJ IV PUSH: CPT

## 2022-04-04 PROCEDURE — 258N000003 HC RX IP 258 OP 636: Performed by: PEDIATRICS

## 2022-04-04 PROCEDURE — 96376 TX/PRO/DX INJ SAME DRUG ADON: CPT

## 2022-04-04 PROCEDURE — 250N000011 HC RX IP 250 OP 636: Performed by: PEDIATRICS

## 2022-04-04 RX ORDER — DIPHENHYDRAMINE HCL 25 MG
25 CAPSULE ORAL
Status: CANCELLED
Start: 2022-07-01

## 2022-04-04 RX ORDER — HEPARIN SODIUM (PORCINE) LOCK FLUSH IV SOLN 100 UNIT/ML 100 UNIT/ML
5 SOLUTION INTRAVENOUS
Status: CANCELLED | OUTPATIENT
Start: 2022-07-01

## 2022-04-04 RX ORDER — HEPARIN SODIUM (PORCINE) LOCK FLUSH IV SOLN 100 UNIT/ML 100 UNIT/ML
5 SOLUTION INTRAVENOUS
Status: DISCONTINUED | OUTPATIENT
Start: 2022-04-04 | End: 2022-04-04 | Stop reason: HOSPADM

## 2022-04-04 RX ORDER — HEPARIN SODIUM,PORCINE 10 UNIT/ML
5 VIAL (ML) INTRAVENOUS
Status: CANCELLED | OUTPATIENT
Start: 2022-07-01

## 2022-04-04 RX ORDER — OXYCODONE HYDROCHLORIDE 15 MG/1
15 TABLET ORAL EVERY 4 HOURS PRN
Qty: 45 TABLET | Refills: 0 | Status: SHIPPED | OUTPATIENT
Start: 2022-04-04 | End: 2022-04-13

## 2022-04-04 RX ORDER — MORPHINE SULFATE 2 MG/ML
2 INJECTION, SOLUTION INTRAMUSCULAR; INTRAVENOUS
Status: CANCELLED
Start: 2022-07-01

## 2022-04-04 RX ORDER — ONDANSETRON 8 MG/1
8 TABLET, FILM COATED ORAL
Status: CANCELLED
Start: 2022-07-01

## 2022-04-04 RX ORDER — MORPHINE SULFATE 2 MG/ML
2 INJECTION, SOLUTION INTRAMUSCULAR; INTRAVENOUS
Status: DISCONTINUED | OUTPATIENT
Start: 2022-04-04 | End: 2022-04-04 | Stop reason: HOSPADM

## 2022-04-04 RX ADMIN — Medication 5 ML: at 13:05

## 2022-04-04 RX ADMIN — DEXTROSE AND SODIUM CHLORIDE 1000 ML: 5; 450 INJECTION, SOLUTION INTRAVENOUS at 10:56

## 2022-04-04 RX ADMIN — MORPHINE SULFATE 2 MG: 2 INJECTION, SOLUTION INTRAMUSCULAR; INTRAVENOUS at 11:55

## 2022-04-04 RX ADMIN — MORPHINE SULFATE 2 MG: 2 INJECTION, SOLUTION INTRAMUSCULAR; INTRAVENOUS at 10:56

## 2022-04-04 RX ADMIN — MORPHINE SULFATE 2 MG: 2 INJECTION, SOLUTION INTRAMUSCULAR; INTRAVENOUS at 12:50

## 2022-04-04 ASSESSMENT — PAIN SCALES - GENERAL: PAINLEVEL: EXTREME PAIN (9)

## 2022-04-04 NOTE — TELEPHONE ENCOUNTER
Prior Authorization Approval    Authorization Effective Date: 3/5/2022  Authorization Expiration Date: 4/4/2023  Medication: Oxycodone 15 mg PA Approval   Approved Dose/Quantity: 45/8 days  Reference #: [id number]   Insurance Company: [employer] - Phone [phone] Fax [phone]  Expected CoPay:       CoPay Card Available:      Foundation Assistance Needed:    Which Pharmacy is filling the prescription (Not needed for infusion/clinic administered): [facility] [address] 6-[address]  Pharmacy Notified: Yes  Patient Notified: Yes          Carole Ritchie  Oncology Pharmacy Liaison II  [email or web address]  Phone: [phone]  Fax: [phone]

## 2022-04-04 NOTE — TELEPHONE ENCOUNTER
Pt called in to triage requesting IVF pain meds for generalized pain rated 9/10 since last night. Stated last took prn oxy codone and MS contin at 6 a.m. this morning without relief. Denied any fevers, chills, cough, sob, chest pain, numbness or tingling, swelling, or other symptoms not typical of sickle cell pain.     Pt's last infusion was 4/1/22, last clinic visit 3/21/22 with follow-up on 4/22/22 with Patricia Mantilla, ANDREAS.     Patient states she will need a ride. 25 minutes.    Pt states she needs a refill of Oxycodone. Pended up in refill encounter.    Pt qualifies for sickle cell standing order protocol.    If you do not hear from the infusion center by 2pm then you will not be able to get in for an infusion today.     Pt added to infusion wait list.    BMT can take pt at 11.  Called pt to offer apt. Pt confirmed she can come at 11.  IB sent to scheduling.  Notified BMT charge that pt confirmed.  Will contact  to assist with ride at 0800--Per , she will assist with arranging ride for pt.    Routed to Patricia Mantilla CNP and Arely Krueger RNCC

## 2022-04-04 NOTE — DISCHARGE SUMMARY
[facility]  Hospitalist Discharge Summary      Date of Admission:  1/29/2022  Date of Discharge:  2/1/2022 12:50 AM  Discharging Provider: Suraj Aguillon MD  Discharge Service: Hospitalist Service, GOLD TEAM 7    Discharge Diagnoses   Sickle cell disease with acute pain crisis  Hx of CVA with RUE hemiparesis  Hx of PE  Subtherapeutic INR  Hypokalemia  Iron overload  Asthma      Follow-ups Needed After Discharge   Follow-up Appointments     Adult [facility] Follow-up and recommended labs and tests      Follow up with primary care provider, Suraj Case, within 7 days   for hospital follow- up.  No follow up labs or test are needed.      Appointments on [place] and/or [facility] (with [facility] or [facility]   provider or service). Call [phone] if you haven't heard regarding   these appointments within 7 days of discharge.             Unresulted Labs Ordered in the Past 30 Days of this Admission     No orders found from 12/30/2021 to 1/30/2022.      These results will be followed up by N/A    Discharge Disposition   Discharged to home  Condition at discharge: Stable    Hospital Course    Jennifer Cervantes is a 21 year old female with PMHx of HbSS c/b frequent pain crises, hx of CVA c/b cognitive delay and RUE hemiparesis, hx of acute chest syndrome, iron overload 2/2 chronic transfusion, recurrent and progressive PE initially dz on 2/1/2021 previously on DOAC, but ultimately transitioned to coumadin, anxiety, depression, and Asthma admitted on 1/29/2022 for acute sickle cell pain crisis, with pleuritic chest pain in setting of subtherapeutic INR.      # Chest pain  # HbSS with possibly acute pain crisis  - Presenting with pleuritic chest pain for the last week. Without any respiratory symptoms or dyspnea. EKG with sinus tachycardia without any acute ischemic changes. Troponin negative. CXR clear. Mild leukocytosis. Negative COVID 19 PCR and negative infectious  work up.  Her symptoms improved on conservative management and she was felt safe to discharg.e       # Hx of PE   # Subtherapeutic INR -  Initially dx with PE on 2/1/2021, and initially treated with DOACs (first on rivaroxaban, then apixaban after patient developed DVT, then rivaroxaban again, and then dabigatran with ASA), but patient kept having recurrent DVT/PE. Patient transitioned to coumadin on 11/2021 after found to have another acute on chronic PE on DOAC + ASA. Patient reports compliance.  Likely a high metabolizer.  - Lovenox bridge, 1 mg/kg BID until INR therapeutic      # HbSS with possibly acute pain crisis   # Hx CVA with RUE hemiparesis from HbSS  Baseline hgb 6.0-7.0s. Currently at baseline. Reticulocyte count up slightly from baseline.    - Pain management per pain plan: Continue PTA MS contin 15 mg BID. Increased PTA oxycodone IR, from 15 mg to 20 mg Q4H PRN. If pain uncontrolled, will add ketamine drip 4 mg /hr   - Add tylenol 975 mg TID, ketolorac 30 mg Q6H   - Hematology consult   - Continue PTA Hydrea 3000 mg daily   - Trend CBC and reticulocyte count   - Bowel regimen   - Benadryl PO PRN For itching   - Zofran PRN   - IS, pulmonary toilet, encourage ambulation   - Infectious work up for trigger with blood cultures, UA, RVP with new leukocytosis      # Hypokalemia - Replete per nursing sliding scale.      # Iron overload - continue PTA jadenu 1400 mg daliy      # Asthma - Continue PTA Symbicort and albuterol      # Depression, anxiety - No longer on medications.     Consultations This Hospital Stay   HEMATOLOGY ADULT IP CONSULT  PHARMACY TO DOSE WARFARIN  CARE MANAGEMENT / SOCIAL WORK IP CONSULT  CARE MANAGEMENT / SOCIAL WORK IP CONSULT    Code Status   Prior    Time Spent on this Encounter   I, Suraj Aguillon MD, personally saw the patient today and spent greater than 30 minutes discharging this patient.       Suraj Aguillon MD  [facility] [address] [place] [address]  [facility] [address]  Phone: [phone]  ______________________________________________________________________    Physical Exam   Vital Signs:                   Weight: 169 lbs 9.6 oz  General Appearance: NAD  Respiratory: CTA b/l  Cardiovascular: RRR S1/S2, no m,r,g  GI: soft, NT, ND, +BS  Skin: no rashes  Other: no edema        Primary Care Physician   Suraj Case    Discharge Orders      Reason for your hospital stay    You were admitted with new onset chest pain and mild shortness of breath which improved with pain medications and time.  Your infection work up was negative.  During your stay, we found that your coumadin dosing was too low and you were started on lovenox shots to bridge you until your coumadin is therapeutic.  You will be started on 30 mg a day (6 tabs) and coumadin clinic will give further instructions.     Activity    Your activity upon discharge: activity as tolerated     Adult [facility] Follow-up and recommended labs and tests    Follow up with primary care provider, Suraj Case, within 7 days for hospital follow- up.  No follow up labs or test are needed.      Appointments on [place] and/or [facility] (with [facility] or [facility] provider or service). Call [phone] if you haven't heard regarding these appointments within 7 days of discharge.     When to contact your care team    Call your primary doctor if you have any of the following: fever, chills, difficulty breathing, worsening pain, bleeding, chest pain, nausea, vomiting, abdominal pain or other symptoms currently.     Discharge Instructions    You will be discharged on 30 mg (6 tabs) of coumadin a day.  You will need routine INR checks (next on 2/2) until you are therapeutic.  The lovenox shots will prevent new clots until your INR is in the correct range.  Continue taking until you are told it is safe to stop     Diet    Follow this diet upon discharge: Orders Placed This Encounter      Regular  Diet Adult       Significant Results and Procedures   Most Recent 3 CBC's:Recent Labs   Lab Test 03/30/22  0703 03/21/22  1035 03/20/22  0447   WBC 12.3* 10.1 10.8   HGB 7.4* 7.9* 7.6*   MCV 89 92 93    387 373     Most Recent 3 BMP's:Recent Labs   Lab Test 03/30/22  0703 03/21/22  1035 03/20/22  0447    140 138   POTASSIUM 3.8 3.6 3.5   CHLORIDE 110* 110* 112*   CO2 20 19* 20   BUN 8 6* 6*   CR 0.43* 0.45* 0.46*   ANIONGAP 8 11 6   MICAH 8.6 8.4* 8.2*   * 100* 110*     Most Recent 2 LFT's:Recent Labs   Lab Test 03/30/22  0703 03/21/22  1035   AST 85* 73*   ALT 71* 67*   ALKPHOS 83 83   BILITOTAL 3.4* 4.1*     Most Recent 3 INR's:Recent Labs   Lab Test 03/06/22  2356 02/21/22  1005 02/17/22  1134   INR 1.35* 1.24* 1.16*   ,   Results for orders placed or performed during the hospital encounter of 01/29/22   XR Chest 2 Views    Narrative    EXAM: XR CHEST 2 VW  LOCATION: [facility]  DATE/TIME: 1/29/2022 4:14 AM    INDICATION: chest pain  COMPARISON: 09/20/2021      Impression    IMPRESSION: Left-sided port with tip over atrial caval junction. No pneumothorax or pleural effusion. No focal consolidation. Cardiac silhouette within normal limits. Right upper quadrant cholecystectomy clips.   POC US ECHO LIMITED    Impression    Limited Bedside Cardiac Ultrasound, performed and interpreted by me.   Indication: Chest Pain.  Parasternal long axis, parasternal short axis and apical 4 chamber views were acquired.   Image quality was satisfactory.    Findings:    Global left ventricular function appears intact.  Chambers do not appear dilated.  There is no evidence of free fluid within the pericardium.    IMPRESSION: Grossly normal left ventricular function and chamber size.  No pericardial effusion..     Lung vent and perf (NM)    Narrative    Examination:NM LUNG SCAN VENTILATION AND PERFUSION, 1/31/2022 2:14 PM     Indication:  PE suspected, high prob      Technique:    The patient received 2 mCi of Tc-99m DTPA aerosol for ventilation and  6.3 mCi of Tc-99m MAA for perfusion. Multiple images were obtained of  the lungs in Anterior, posterior, RICHTER, RPO, LPO, and  Malay projections.    Comparison: Same-day radiograph    Findings:  Planar images in this patient were limited and demonstrated the  following.   The ventilation study demonstrates no unmatched area of photopenia.    The perfusion study demonstrates no un-matched area of photopenia.    The SPECT-CT images were then obtained given that on prior examination  and the defect is only seen on SPECT-CT.  On the CT portion of the SPECT-CT demonstrates multiple opacities in  the right upper lobe some of which are peripheral some of which are  central and 2 larger subpleural opacities left lung base which are  ovoid in shape 2.7 cm in the left lower lobe and 2.0 cm in the  lingula.  There is loss of perfusion of these areas which is expected with an  opacity. There are no mismatched perfusion defects.  Of note, the left  lower lobe opacity appears in a space that was not perfused on prior  study 11/15/2021.      Impression    Impression: Pneumonia. Pulmonary emboli are unlikely.   1. Multifocal infection involving the right upper lobe.  2. Two focal opacities within the lingula and one in the left lower  lobe subpleural ovoid in shape 2-2.7 cm in size. These most likely  represent infection rather than pulmonary infarct given the rounded  shape and right upper lobe findings.  Of note, the left lower lobe  area of the lung that is opacified was a previous site of loss of  perfusion likely PE on 11/10/21 and 11/15/21 studies.       I have personally reviewed the examination and initial interpretation  and I agree with the findings.    BRANDEN GARICA MD         SYSTEM ID:  [id number]   Echo Complete     Value    LVEF  50-55% (borderline)    Narrative    [id number]  [id number]  [id number]  [id number]^LAI^GRACIA      [facility]  Echocardiography Laboratory  [address] [place] [address]     Name: HIMANSHU AL  MRN: [id number]  : 2000  Study Date: 2022 11:03 AM  Age: 21 yrs  Gender: Female  Patient Location: [facility]  Reason For Study: Chest Pain  Ordering Physician: GRACIA HOYT  Performed By: Susi Ramos RDCS     BSA: 1.8 m2  Height: 64 in  Weight: 170 lb  HR: 104  BP: 108/59 mmHg  ______________________________________________________________________________  Procedure  Complete Portable Echo Adult.  ______________________________________________________________________________  Interpretation Summary  Left ventricular function is decreased. The ejection fraction is 50-55%  (borderline).  Right ventricular function, chamber size, wall motion, and thickness are  normal.  No significant valvular abnormalities were noted.  No pericardial effusion is present.  This study was compared with the study from 21 .  No significant changes noted.  ______________________________________________________________________________  Left Ventricle  Left ventricular size is normal. Left ventricular wall thickness is normal.  Left ventricular function is decreased. The ejection fraction is 50-55%  (borderline). Left ventricular diastolic function is normal. Diastolic Doppler  findings (E/E' ratio and/or other parameters) suggest left ventricular filling  pressures are normal. Abnormal non-specific septal motion is present.     Right Ventricle  Right ventricular function, chamber size, wall motion, and thickness are  normal.     Atria  Both atria appear normal.     Mitral Valve  The mitral valve is normal. Trace mitral insufficiency is present.     Aortic Valve  Aortic valve is normal in structure and function. The aortic valve is  tricuspid.     Tricuspid Valve  The tricuspid valve is normal. Trace tricuspid insufficiency is present. The  peak velocity of the  tricuspid regurgitant jet is not obtainable. Pulmonary  artery systolic pressure cannot be assessed.     Pulmonic Valve  The pulmonic valve is normal. Trace pulmonic insufficiency is present.     Vessels  The aorta root is normal. The pulmonary artery cannot be assessed. The  inferior vena cava was normal in size with preserved respiratory variability.  IVC diameter <2.1 cm collapsing >50% with sniff suggests a normal RA pressure  of 3 mmHg.     Pericardium  No pericardial effusion is present.     Compared to Previous Study  This study was compared with the study from 21 . No significant changes  noted.  ______________________________________________________________________________  MMode/2D Measurements & Calculations     IVSd: 0.84 cm  LVIDd: 4.9 cm  LVIDs: 3.5 cm  LVPWd: 0.96 cm  FS: 29.0 %  LV mass(C)d: 151.9 grams  LV mass(C)dI: 83.2 grams/m2  Ao root diam: 2.8 cm  asc Aorta Diam: 2.4 cm  LVOT diam: 2.1 cm  LVOT area: 3.5 cm2  LA Volume (BP): 28.2 ml  LA Volume Index (BP): 15.4 ml/m2  RWT: 0.39     Doppler Measurements & Calculations  MV E max rigo: 113.0 cm/sec  MV dec slope: 921.0 cm/sec2  PA acc time: 0.14 sec  E/E' av.8  Lateral E/e': 7.6  Medial E/e': 9.9     ______________________________________________________________________________  Report approved by: Chava Wagner 2022 06:22 PM           *Note: Due to a large number of results and/or encounters for the requested time period, some results have not been displayed. A complete set of results can be found in Results Review.       Discharge Medications   Discharge Medication List as of 2022  7:45 PM      START taking these medications    Details   enoxaparin ANTICOAGULANT (LOVENOX) 80 MG/0.8ML syringe Inject 0.8 mLs (80 mg) Subcutaneous every 12 hours for 10 days, Disp-16 mL, R-0, E-Prescribe         CONTINUE these medications which have CHANGED    Details   warfarin ANTICOAGULANT (COUMADIN) 5 MG tablet Take 6 tablets daily or  as directed by the Coumadin clinic., Disp-190 tablet, R-3, E-Prescribe         CONTINUE these medications which have NOT CHANGED    Details   acetaminophen (TYLENOL) 325 MG tablet Take 2 tablets (650 mg) by mouth every 6 hours as needed for mild pain, Disp-120 tablet, R-3, E-Prescribe      albuterol (PROAIR HFA/PROVENTIL HFA/VENTOLIN HFA) 108 (90 Base) MCG/ACT inhaler Inhale 2 puffs into the lungs every 6 hours as needed for shortness of breath / dyspnea or wheezing, Disp-8.5 g, R-3, E-PrescribePharmacy may dispense brand covered by insurance (Proair, or proventil or ventolin or generic albuterol inhaler)      albuterol (PROVENTIL) (2.5 MG/3ML) 0.083% neb solution Take 1 vial (2.5 mg) by nebulization every 6 hours as needed for shortness of breath / dyspnea or wheezing, Disp-12 mL, R-4, E-Prescribe      budesonide-formoterol (SYMBICORT) 160-4.5 MCG/ACT Inhaler Inhale 2 puffs into the lungs 2 times daily, Disp-10.2 g, R-3, E-Prescribe      diphenhydrAMINE (BENADRYL) 25 MG capsule Take 1-2 capsules (25-50 mg) by mouth nightly as needed for sleep, Disp-60 capsule, R-3, E-Prescribe      EPINEPHrine (ANY BX GENERIC EQUIV) 0.3 MG/0.3ML injection 2-pack Inject 0.3 mLs (0.3 mg) into the muscle as needed for anaphylaxis, Disp-1 each, R-1, E-Prescribe      ondansetron (ZOFRAN) 8 MG tablet Take 1 tablet (8 mg) by mouth every 8 hours as needed, Disp-30 tablet, R-1, E-Prescribe      deferasirox (JADENU) 360 MG tablet Take 4 tablets (1,440 mg) by mouth every evening, Disp-120 tablet, R-4, Historical      Hydroxyurea 1000 MG TABS Take 3,000 mg by mouth daily, Disp-90 tablet, R-3, E-Prescribe      morphine (MS CONTIN) 15 MG CR tablet Take 1 tablet (15 mg) by mouth every 12 hours, Disp-60 tablet, R-0, E-Prescribe      oxyCODONE IR (ROXICODONE) 15 MG tablet Take 1 tablet (15 mg) by mouth every 4 hours as needed for severe pain Goal 4 per day. Max 6 per day., Disp-45 tablet, R-0, E-Prescribe      medroxyPROGESTERone (DEPO-PROVERA) 150  MG/ML IM injection Inject 150 mg into the muscle, Historical      lidocaine-prilocaine (EMLA) 2.5-2.5 % external cream Apply topically once for 1 dose Use for port site as neededDisp-30 g, [id number]      naloxone (NARCAN) 4 MG/0.1ML nasal spray Spray 1 spray (4 mg) into one nostril alternating nostrils once as needed for opioid reversal every 2-3 minutes until assistance arrives, Disp-0.2 mL, R-1, E-Prescribe           Allergies   Allergies   Allergen Reactions     Contrast Dye      Hives and breathing issues     Fish-Derived Products Hives     Seafood Hives     Diagnostic X-Ray Materials      Gadolinium

## 2022-04-04 NOTE — PROGRESS NOTES
Social Work Note: Telephone Call  Oncology Clinic     Data/Intervention:  Patient Name:  Jennifer Cervantes  /Age: 2000, 22 years old     Call From: [facility]        Reason for Call:  Transportation      Assessment:    called [email or web address] to arrange ride through patient's insurance. [employer] arranged  for patient from home with [employer] ([phone]).  Patient will need to call when ready for return ride home ([phone]).      Plan:  Patient is aware of the transportation plan.  available to assist with any other needs.      CARLOS Chavez,UDAY  Hematology/Oncology Social Worker  Phone:[phone] Pager: [phone]

## 2022-04-04 NOTE — PROGRESS NOTES
Infusion Nursing Note:  Jennifer Cervantes presents today for IV fluids and pain medications.    Patient seen by provider today: No   present during visit today: Not Applicable.    Note:   Pt received a liter of D5.45 NS over two hours.    Pt received morphine 2 mg IV every hour for 3 doses.      Intravenous Access:  Implanted Port.    Treatment Conditions:  No labs ordered.      Post Infusion Assessment:  Patient tolerated infusion without incident.  Blood return noted pre and post infusion.  Site patent and intact, free from redness, edema or discomfort.  No evidence of extravasations.  Access discontinued per protocol.       Discharge Plan:   Copy of AVS printed for patient.  Patient discharged in stable condition accompanied by: self.  Departure Mode: Ambulatory.      Jamaica Napoles RN

## 2022-04-04 NOTE — TELEPHONE ENCOUNTER
Refill Request    Date of most recent appointment:  3/21 (Perla)  Next upcoming appointment:   4/22 (Jose Rafael)  Prescribing provider(s):  Perla  Person requesting refill:  Jennifer    Medication requested:  Oxycodone 15 mg tablet  Quantity:  45  Last fill date:  3/21/22    Notes:  Has two days worth remaining  Pain being treated sickle cell pain  Number of pills remaining: approx 12  When will pt be out of meds: two days  Side effects: denied  Number of times pt takes per day: 6 tabs/day  Other Requesting infusion today--pt added to infusion wait list    Not  reviewed.    Pended and Routed to Patricia Mantilla CNP because Dr. Duncan is out of the office.

## 2022-04-04 NOTE — TELEPHONE ENCOUNTER
PA Initiation    Medication: Oxycodone 15 mg PA  Insurance Company: [employer] - Phone [phone] Fax [phone]  Pharmacy Filling the Rx: [facility] [place], [place] - [address] [place] [address] [phone]  Filling Pharmacy Phone:    Filling Pharmacy Fax:    Start Date: 4/4/2022    [id number]    Carole Ritchie  Oncology Pharmacy Liaison II  [email or web address]  Phone: [phone]  Fax: [phone]

## 2022-04-04 NOTE — PROGRESS NOTES
This is a recent snapshot of the patient's [facility] medical record.  For current drug dose and complete information and questions, call [phone] or In Basket pool, fv home infusion ([id number])  CSN Number:  [id number]

## 2022-04-04 NOTE — LETTER
4/4/2022         RE: Jennifer Cervantes  [address]  [place] [address]        Dear Colleague,    Thank you for referring your patient, Jennifer Cervantes, to the [facility] BLOOD AND MARROW TRANSPLANT PROGRAM [place]. Please see a copy of my visit note below.    Infusion Nursing Note:  Jennifer Cervantes presents today for IV fluids and pain medications.    Patient seen by provider today: No   present during visit today: Not Applicable.    Note:   Pt received a liter of D5.45 NS over two hours.    Pt received morphine 2 mg IV every hour for 3 doses.      Intravenous Access:  Implanted Port.    Treatment Conditions:  No labs ordered.      Post Infusion Assessment:  Patient tolerated infusion without incident.  Blood return noted pre and post infusion.  Site patent and intact, free from redness, edema or discomfort.  No evidence of extravasations.  Access discontinued per protocol.       Discharge Plan:   Copy of AVS printed for patient.  Patient discharged in stable condition accompanied by: self.  Departure Mode: Ambulatory.      Jamaica Napoles RN                      Again, thank you for allowing me to participate in the care of your patient.        Sincerely,        [facility]

## 2022-04-08 ENCOUNTER — TELEPHONE (OUTPATIENT)
Dept: ONCOLOGY | Facility: CLINIC | Age: 22
End: 2022-04-08
Payer: COMMERCIAL

## 2022-04-08 ENCOUNTER — PATIENT OUTREACH (OUTPATIENT)
Dept: CARE COORDINATION | Facility: CLINIC | Age: 22
End: 2022-04-08
Payer: COMMERCIAL

## 2022-04-08 ENCOUNTER — INFUSION THERAPY VISIT (OUTPATIENT)
Dept: INFUSION THERAPY | Facility: CLINIC | Age: 22
End: 2022-04-08
Attending: PEDIATRICS
Payer: COMMERCIAL

## 2022-04-08 VITALS
TEMPERATURE: 98 F | DIASTOLIC BLOOD PRESSURE: 86 MMHG | SYSTOLIC BLOOD PRESSURE: 134 MMHG | RESPIRATION RATE: 16 BRPM | HEART RATE: 98 BPM | OXYGEN SATURATION: 94 %

## 2022-04-08 DIAGNOSIS — D57.00 SICKLE CELL PAIN CRISIS (H): ICD-10-CM

## 2022-04-08 DIAGNOSIS — G81.10 SPASTIC HEMIPLEGIA, UNSPECIFIED ETIOLOGY, UNSPECIFIED LATERALITY (H): Primary | ICD-10-CM

## 2022-04-08 PROCEDURE — 250N000011 HC RX IP 250 OP 636: Performed by: PEDIATRICS

## 2022-04-08 PROCEDURE — 96365 THER/PROPH/DIAG IV INF INIT: CPT

## 2022-04-08 PROCEDURE — 96366 THER/PROPH/DIAG IV INF ADDON: CPT

## 2022-04-08 PROCEDURE — 258N000003 HC RX IP 258 OP 636: Performed by: PEDIATRICS

## 2022-04-08 PROCEDURE — 96376 TX/PRO/DX INJ SAME DRUG ADON: CPT

## 2022-04-08 PROCEDURE — 96375 TX/PRO/DX INJ NEW DRUG ADDON: CPT

## 2022-04-08 RX ORDER — HEPARIN SODIUM (PORCINE) LOCK FLUSH IV SOLN 100 UNIT/ML 100 UNIT/ML
5 SOLUTION INTRAVENOUS
Status: DISCONTINUED | OUTPATIENT
Start: 2022-04-08 | End: 2022-04-08 | Stop reason: HOSPADM

## 2022-04-08 RX ORDER — ONDANSETRON 8 MG/1
8 TABLET, FILM COATED ORAL
Status: CANCELLED
Start: 2022-07-01

## 2022-04-08 RX ORDER — MORPHINE SULFATE 2 MG/ML
2 INJECTION, SOLUTION INTRAMUSCULAR; INTRAVENOUS
Status: CANCELLED
Start: 2022-07-01

## 2022-04-08 RX ORDER — MORPHINE SULFATE 2 MG/ML
2 INJECTION, SOLUTION INTRAMUSCULAR; INTRAVENOUS
Status: COMPLETED | OUTPATIENT
Start: 2022-04-08 | End: 2022-04-08

## 2022-04-08 RX ORDER — DIPHENHYDRAMINE HCL 25 MG
25 CAPSULE ORAL
Status: CANCELLED
Start: 2022-07-01

## 2022-04-08 RX ORDER — HEPARIN SODIUM (PORCINE) LOCK FLUSH IV SOLN 100 UNIT/ML 100 UNIT/ML
5 SOLUTION INTRAVENOUS
Status: CANCELLED | OUTPATIENT
Start: 2022-07-01

## 2022-04-08 RX ORDER — HEPARIN SODIUM,PORCINE 10 UNIT/ML
5 VIAL (ML) INTRAVENOUS
Status: CANCELLED | OUTPATIENT
Start: 2022-07-01

## 2022-04-08 RX ADMIN — DEXTROSE AND SODIUM CHLORIDE 1000 ML: 5; 450 INJECTION, SOLUTION INTRAVENOUS at 10:56

## 2022-04-08 RX ADMIN — MORPHINE SULFATE 2 MG: 2 INJECTION, SOLUTION INTRAMUSCULAR; INTRAVENOUS at 11:45

## 2022-04-08 RX ADMIN — MORPHINE SULFATE 2 MG: 2 INJECTION, SOLUTION INTRAMUSCULAR; INTRAVENOUS at 12:51

## 2022-04-08 RX ADMIN — Medication 5 ML: at 13:09

## 2022-04-08 RX ADMIN — MORPHINE SULFATE 2 MG: 2 INJECTION, SOLUTION INTRAMUSCULAR; INTRAVENOUS at 10:55

## 2022-04-08 ASSESSMENT — PAIN SCALES - GENERAL: PAINLEVEL: EXTREME PAIN (9)

## 2022-04-08 NOTE — LETTER
4/8/2022         RE: Jennifer Cervantes  [address]  [place] [address]        Dear Colleague,    Thank you for referring your patient, Jennifer Cervantes, to the [facility]. Please see a copy of my visit note below.    Infusion Nursing Note:  Jennifer Cervantes presents today for   Chief Complaint   Patient presents with     Infusion     IVF+Pain medication     .    Patient seen by provider today: No   present during visit today: Not Applicable.    Note: D5 0.45 % 1000 ml given over 2 hours .      Intravenous Access:  Implanted Port.    Treatment Conditions:  Not Applicable.      Post Infusion Assessment:  Patient tolerated infusion without incident.  Site patent and intact, free from redness, edema or discomfort.  No evidence of extravasations.  Access discontinued per protocol.       Discharge Plan:   Discharge instructions reviewed with: Patient.  Patient and/or family verbalized understanding of discharge instructions and all questions answered.  Patient discharged in stable condition accompanied by: self.  Departure Mode: Ambulatory.    Administrations This Visit     dextrose 5% and 0.45% NaCl BOLUS     Admin Date  04/08/2022 Action  New Bag Dose  1,000 mL Rate  500 mL/hr Route  Intravenous Administered By  Neyda Sheriff RN          morphine (PF) injection 2 mg     Admin Date  04/08/2022 Action  Given Dose  2 mg Route  Intravenous Administered By  Neyda Sheriff RN           Admin Date  04/08/2022 Action  Given Dose  2 mg Route  Intravenous Administered By  Neyda Sheriff RN           Admin Date  04/08/2022 Action  Given Dose  2 mg Route  Intravenous Administered By  Neyda Sheriff RN Hope Balcos, RN          Again, thank you for allowing me to participate in the care of your patient.      Sincerely,    [facility] [address] [facility]

## 2022-04-08 NOTE — PROGRESS NOTES
Infusion Nursing Note:  Jennifer Cervantes presents today for   Chief Complaint   Patient presents with     Infusion     IVF+Pain medication     .    Patient seen by provider today: No   present during visit today: Not Applicable.    Note: D5 0.45 % 1000 ml given over 2 hours .      Intravenous Access:  Implanted Port.    Treatment Conditions:  Not Applicable.      Post Infusion Assessment:  Patient tolerated infusion without incident.  Site patent and intact, free from redness, edema or discomfort.  No evidence of extravasations.  Access discontinued per protocol.       Discharge Plan:   Discharge instructions reviewed with: Patient.  Patient and/or family verbalized understanding of discharge instructions and all questions answered.  Patient discharged in stable condition accompanied by: self.  Departure Mode: Ambulatory.    Administrations This Visit     dextrose 5% and 0.45% NaCl BOLUS     Admin Date  04/08/2022 Action  New Bag Dose  1,000 mL Rate  500 mL/hr Route  Intravenous Administered By  Neyda Sheriff RN          morphine (PF) injection 2 mg     Admin Date  04/08/2022 Action  Given Dose  2 mg Route  Intravenous Administered By  Neyda Sheriff RN           Admin Date  04/08/2022 Action  Given Dose  2 mg Route  Intravenous Administered By  Neyda Sheriff RN           Admin Date  04/08/2022 Action  Given Dose  2 mg Route  Intravenous Administered By  Neyda Sheriff RN Hope Balcos, RN

## 2022-04-08 NOTE — PROGRESS NOTES
Social Work Note: Telephone Call  Oncology Clinic     Data/Intervention:  Patient Name:  Jennifer Cervantes  /Age: 2000, 22 years old     Call From: [facility]        Reason for Call:  Transportation     Assessment:   called [employer] ([phone]) to arrange ride through patient's insurance. [employer] arranged  for patient from home with [employer] ([phone]).  Patient will need to call when ready for return ride home ([phone]).      Plan:  Patient is aware of the transportation plan.  available to assist with any other needs.      CARLOS Chavez,[facility]  Hematology/Oncology Social Worker  Phone:[phone] Pager: [phone]

## 2022-04-08 NOTE — TELEPHONE ENCOUNTER
Pt called in to triage requesting IVF pain meds for lower back pain rated 9/10 x 5 days. Stated last took prn oxycodone and MS contin at 6 a.m. this morning without relief. Denied any fevers, chills, cough, sob, chest pain, numbness or tingling or other symptoms not typical of sickle cell pain.     Pt's last infusion was 4/4/22, last clinic visit 3/21/22 with follow-up on 4/22/22 with Patricia Mantilla CNP.     Patient states needs a ride, 25 minutes.     Pt denied being out of home medications and needing any refills today.     Pt qualifies for sickle cell standing order protocol.    If you do not hear from the infusion center by 2pm then you will not be able to get in for an infusion today.     Added to infusion wait list.    [facility] has apt at 1100.  Called Jennifer who confirmed she can come to apt.   Contacted  to assist with transportation.  IB sent to scheduling.  Updated [facility] Charge Nurse that pt is confirmed.     Routed to Patricia Mantilla CNP and Arely Krueger, MAURISIO.

## 2022-04-08 NOTE — PATIENT INSTRUCTIONS
Dear Jennifer Cervantes    Thank you for choosing [facility] ([facility]) for your infusion.  The following information is a summary of our appointment as well as important reminders.        We look forward in seeing you on your next appointment here at [facility] ([facility]).  Please don t hesitate to call us at [phone] to reschedule any of your appointments or to speak with one of the [facility] registered nurses.  It was a pleasure taking care of you today.    Sincerely,    [facility]  [facility]  [address] [place] [address]  [address]  Phone:  [phone]

## 2022-04-11 ENCOUNTER — TELEPHONE (OUTPATIENT)
Dept: ONCOLOGY | Facility: CLINIC | Age: 22
End: 2022-04-11
Payer: COMMERCIAL

## 2022-04-11 ENCOUNTER — INFUSION THERAPY VISIT (OUTPATIENT)
Dept: INFUSION THERAPY | Facility: CLINIC | Age: 22
End: 2022-04-11
Attending: PEDIATRICS
Payer: COMMERCIAL

## 2022-04-11 VITALS
SYSTOLIC BLOOD PRESSURE: 144 MMHG | DIASTOLIC BLOOD PRESSURE: 81 MMHG | HEART RATE: 114 BPM | TEMPERATURE: 98.2 F | OXYGEN SATURATION: 91 %

## 2022-04-11 DIAGNOSIS — G81.10 SPASTIC HEMIPLEGIA, UNSPECIFIED ETIOLOGY, UNSPECIFIED LATERALITY (H): Primary | ICD-10-CM

## 2022-04-11 DIAGNOSIS — D57.00 SICKLE CELL PAIN CRISIS (H): ICD-10-CM

## 2022-04-11 PROCEDURE — 96376 TX/PRO/DX INJ SAME DRUG ADON: CPT

## 2022-04-11 PROCEDURE — 96361 HYDRATE IV INFUSION ADD-ON: CPT

## 2022-04-11 PROCEDURE — 96374 THER/PROPH/DIAG INJ IV PUSH: CPT

## 2022-04-11 PROCEDURE — 258N000003 HC RX IP 258 OP 636: Performed by: PEDIATRICS

## 2022-04-11 PROCEDURE — 250N000011 HC RX IP 250 OP 636: Performed by: PEDIATRICS

## 2022-04-11 RX ORDER — MORPHINE SULFATE 2 MG/ML
2 INJECTION, SOLUTION INTRAMUSCULAR; INTRAVENOUS
Status: CANCELLED
Start: 2022-07-01

## 2022-04-11 RX ORDER — DIPHENHYDRAMINE HCL 25 MG
25 CAPSULE ORAL
Status: DISCONTINUED | OUTPATIENT
Start: 2022-04-11 | End: 2022-04-11 | Stop reason: HOSPADM

## 2022-04-11 RX ORDER — ONDANSETRON 8 MG/1
8 TABLET, ORALLY DISINTEGRATING ORAL
Status: DISCONTINUED | OUTPATIENT
Start: 2022-04-11 | End: 2022-04-11 | Stop reason: HOSPADM

## 2022-04-11 RX ORDER — ONDANSETRON 8 MG/1
8 TABLET, FILM COATED ORAL
Status: CANCELLED
Start: 2022-07-01

## 2022-04-11 RX ORDER — HEPARIN SODIUM (PORCINE) LOCK FLUSH IV SOLN 100 UNIT/ML 100 UNIT/ML
5 SOLUTION INTRAVENOUS
Status: CANCELLED | OUTPATIENT
Start: 2022-07-01

## 2022-04-11 RX ORDER — DIPHENHYDRAMINE HCL 25 MG
25 CAPSULE ORAL
Status: CANCELLED
Start: 2022-07-01

## 2022-04-11 RX ORDER — MORPHINE SULFATE 2 MG/ML
2 INJECTION, SOLUTION INTRAMUSCULAR; INTRAVENOUS
Status: COMPLETED | OUTPATIENT
Start: 2022-04-11 | End: 2022-04-11

## 2022-04-11 RX ORDER — HEPARIN SODIUM (PORCINE) LOCK FLUSH IV SOLN 100 UNIT/ML 100 UNIT/ML
5 SOLUTION INTRAVENOUS
Status: DISCONTINUED | OUTPATIENT
Start: 2022-04-11 | End: 2022-04-11 | Stop reason: HOSPADM

## 2022-04-11 RX ORDER — HEPARIN SODIUM,PORCINE 10 UNIT/ML
5 VIAL (ML) INTRAVENOUS
Status: CANCELLED | OUTPATIENT
Start: 2022-07-01

## 2022-04-11 RX ADMIN — MORPHINE SULFATE 2 MG: 2 INJECTION, SOLUTION INTRAMUSCULAR; INTRAVENOUS at 10:05

## 2022-04-11 RX ADMIN — DEXTROSE AND SODIUM CHLORIDE 1000 ML: 5; 450 INJECTION, SOLUTION INTRAVENOUS at 09:57

## 2022-04-11 RX ADMIN — MORPHINE SULFATE 2 MG: 2 INJECTION, SOLUTION INTRAMUSCULAR; INTRAVENOUS at 11:11

## 2022-04-11 RX ADMIN — Medication 5 ML: at 12:30

## 2022-04-11 RX ADMIN — MORPHINE SULFATE 2 MG: 2 INJECTION, SOLUTION INTRAMUSCULAR; INTRAVENOUS at 12:04

## 2022-04-11 NOTE — LETTER
4/11/2022         RE: Jennifer Cervantes  [address]  [facility] [address]        Dear Colleague,    Thank you for referring your patient, Jennifer Cervantes, to the [facility] TREATMENT [facility]. Please see a copy of my visit note below.    Nursing Note  Jennifer Cervantes presents [facility] for:   Chief Complaint   Patient presents with     Infusion     IV fluids, pain medications     During today's [facility] appointment, orders from Reji Duncan MD were completed.  Frequency: once    Progress note:  Patient identification verified by name and date of birth.  Assessment completed.  Vitals recorded in Doc Flowsheets.  Patient was provided with education regarding medication/procedure and possible side effects.  Patient verbalized understanding.     present during visit today: Not Applicable.    Treatment Conditions: Non-applicable.  Premedications: Patient denied needing any of the PRN medications  Drug Waste Record: No  Infusion length and rate:  500 ml/hr., over 2 hours  Labs: were not ordered for this appointment.  Vascular access: port accessed today.    Is the next appt scheduled? no  Asymptomatic COVID test completed? no    Post Infusion Assessment:  Patient tolerated infusion without incident.  Blood return noted pre and post infusion.  Site patent and intact, free from redness, edema or discomfort.  No evidence of extravasations.  Access discontinued per protocol.     Discharge Plan:   Follow up plan of care with: ongoing infusions at [facility].  Discharge instructions were reviewed with patient.  Patient/representative verbalized understanding of discharge instructions and all questions answered.  Patient discharged from [facility] in stable condition.    Roz Arellano RN    Administrations This Visit     dextrose 5% and 0.45% NaCl  BOLUS     Admin Date  04/11/2022 Action  New Bag Dose  1,000 mL Rate  500 mL/hr Route  Intravenous Administered By  Roz Arellano RN          heparin 100 UNIT/ML injection 5 mL     Admin Date  04/11/2022 Action  Given Dose  5 mL Route  Intracatheter Administered By  Roz Arellano RN          morphine (PF) injection 2 mg     Admin Date  04/11/2022 Action  Given Dose  2 mg Route  Intravenous Administered By  Roz Arelalno RN           Admin Date  04/11/2022 Action  Given Dose  2 mg Route  Intravenous Administered By  Roz Arellano RN           Admin Date  04/11/2022 Action  Given Dose  2 mg Route  Intravenous Administered By  Kaykay Son RN                BP (!) 144/81   Pulse 114   Temp 98.2  F (36.8  C) (Oral)         Again, thank you for allowing me to participate in the care of your patient.      Sincerely,    [facility]

## 2022-04-11 NOTE — PATIENT INSTRUCTIONS
Dear Jennifer Cervantes    Thank you for choosing [facility] ([facility]) for your infusion.  The following information is a summary of our appointment as well as important reminders.      We look forward in seeing you on your next appointment here at [facility] ([facility]).  Please don t hesitate to call us at [phone] to reschedule any of your appointments or to speak with one of the [facility] registered nurses.  It was a pleasure taking care of you today.    Sincerely,    [facility]  [facility]  [address] [place] [address]  [address]  Phone:  [phone]

## 2022-04-11 NOTE — TELEPHONE ENCOUNTER
[facility] Telephone Triage Note    The following symptoms were reported:   Typical  Description:           Onset: Saturday  Location: lower back  Character: Sharp           Intensity: 10/10    Accompanying Signs & Symptoms:  none    Chest Pain:  denies     Shortness of Breath:  denies     Fever:  denies     Chills:  denies   Cough/sore throat:  denies  Other:  Needs transport, denies any other issues    Therapies Tried and outcome: Oxycodone and MS contin around 6am    Improved by:minimal improvement, still requires intermittent IVF/Pain    The following provider was consulted:    Meets protocol    The following advice/orders were given, and/or interventions recommended:     0804 paged UDAY Marquez to assist with 10am IVf/Pain appt that is available in [facility]    Patient instructions and/or follow up:   Jennifer in agreement with 10am IVF/Pain infusion today at 10am and will wait for UDAY to call about transportation details.     Scheduling notified    If you do not hear from the infusion center by 2pm then you will not be able to get in for an infusion today. If symptoms worsen while waiting for call back, and/or you experience fever, chills, SOB, chest pain, cough, n/v, dizziness, numbness, swelling, discoloration of extremities, then seek emergency evaluation in Emergency Department.

## 2022-04-11 NOTE — PROGRESS NOTES
Nursing Note  Jennifer Cervantes presents today [facility] for:   Chief Complaint   Patient presents with     Infusion     IV fluids, pain medications     During today's [facility] appointment, orders from Reji Duncan MD were completed.  Frequency: once    Progress note:  Patient identification verified by name and date of birth.  Assessment completed.  Vitals recorded in Doc Flowsheets.  Patient was provided with education regarding medication/procedure and possible side effects.  Patient verbalized understanding.     present during visit today: Not Applicable.    Treatment Conditions: Non-applicable.  Premedications: Patient denied needing any of the PRN medications  Drug Waste Record: No  Infusion length and rate:  500 ml/hr., over 2 hours  Labs: were not ordered for this appointment.  Vascular access: port accessed today.    Is the next appt scheduled? no  Asymptomatic COVID test completed? no    Post Infusion Assessment:  Patient tolerated infusion without incident.  Blood return noted pre and post infusion.  Site patent and intact, free from redness, edema or discomfort.  No evidence of extravasations.  Access discontinued per protocol.     Discharge Plan:   Follow up plan of care with: ongoing infusions at [facility].  Discharge instructions were reviewed with patient.  Patient/representative verbalized understanding of discharge instructions and all questions answered.  Patient discharged from [facility] in stable condition.    Roz Arellano RN    Administrations This Visit     dextrose 5% and 0.45% NaCl BOLUS     Admin Date  04/11/2022 Action  New Bag Dose  1,000 mL Rate  500 mL/hr Route  Intravenous Administered By  Roz Arellano RN          heparin 100 UNIT/ML injection 5 mL     Admin Date  04/11/2022 Action  Given Dose  5 mL Route  Intracatheter Administered  By  Roz Arellano, RN          morphine (PF) injection 2 mg     Admin Date  04/11/2022 Action  Given Dose  2 mg Route  Intravenous Administered By  Roz Arellano RN           Admin Date  04/11/2022 Action  Given Dose  2 mg Route  Intravenous Administered By  Roz Arellano RN           Admin Date  04/11/2022 Action  Given Dose  2 mg Route  Intravenous Administered By  Kaykay Son RN                BP (!) 144/81   Pulse 114   Temp 98.2  F (36.8  C) (Oral)

## 2022-04-13 DIAGNOSIS — D57.00 SICKLE CELL PAIN CRISIS (H): ICD-10-CM

## 2022-04-13 RX ORDER — OXYCODONE HYDROCHLORIDE 15 MG/1
15 TABLET ORAL EVERY 4 HOURS PRN
Qty: 45 TABLET | Refills: 0 | Status: SHIPPED | OUTPATIENT
Start: 2022-04-13 | End: 2022-04-22

## 2022-04-13 NOTE — CONFIDENTIAL NOTE
Narcotic Refill Request    Medication(s) requested:  Oxycodone 15 mg tablet   Person Requesting Refill:Jennifer   What pain is the medication treating: Sickle cell pain   How is the medication being taken?:Taking 15 mg every 4 hours   Does pt have enough for today? Yes   Is pain being adequately controlled on the current regimen?: Yes   Experiencing any side effects from medication?: No     Date of most recent appointment:  3/21/22 Dr Duncan   Any No Show Visits:No   Next appointment:   4/22/22 Patricia Mantilla   Last fill date and by whom:  4/4/22 Patricia Mantilla    Reviewed: No Access     Routed provider: Patricia Mantilla

## 2022-04-15 ENCOUNTER — ONCOLOGY VISIT (OUTPATIENT)
Dept: ONCOLOGY | Facility: CLINIC | Age: 22
End: 2022-04-15
Attending: STUDENT IN AN ORGANIZED HEALTH CARE EDUCATION/TRAINING PROGRAM
Payer: COMMERCIAL

## 2022-04-15 ENCOUNTER — INFUSION THERAPY VISIT (OUTPATIENT)
Dept: ONCOLOGY | Facility: CLINIC | Age: 22
End: 2022-04-15
Attending: PEDIATRICS
Payer: COMMERCIAL

## 2022-04-15 ENCOUNTER — TELEPHONE (OUTPATIENT)
Dept: ONCOLOGY | Facility: CLINIC | Age: 22
End: 2022-04-15
Payer: COMMERCIAL

## 2022-04-15 VITALS
BODY MASS INDEX: 29.15 KG/M2 | TEMPERATURE: 98.3 F | OXYGEN SATURATION: 92 % | HEART RATE: 108 BPM | WEIGHT: 169.8 LBS | DIASTOLIC BLOOD PRESSURE: 77 MMHG | SYSTOLIC BLOOD PRESSURE: 115 MMHG

## 2022-04-15 VITALS — HEART RATE: 102 BPM | RESPIRATION RATE: 16 BRPM

## 2022-04-15 DIAGNOSIS — G81.10 SPASTIC HEMIPLEGIA, UNSPECIFIED ETIOLOGY, UNSPECIFIED LATERALITY (H): Primary | ICD-10-CM

## 2022-04-15 DIAGNOSIS — D57.00 SICKLE CELL PAIN CRISIS (H): ICD-10-CM

## 2022-04-15 DIAGNOSIS — I69.351 HEMIPLEGIA AND HEMIPARESIS FOLLOWING CEREBRAL INFARCTION AFFECTING RIGHT DOMINANT SIDE (H): ICD-10-CM

## 2022-04-15 DIAGNOSIS — D57.1 SICKLE CELL DISEASE WITHOUT CRISIS (H): ICD-10-CM

## 2022-04-15 PROCEDURE — 96374 THER/PROPH/DIAG INJ IV PUSH: CPT | Mod: 59

## 2022-04-15 PROCEDURE — 250N000011 HC RX IP 250 OP 636: Performed by: PEDIATRICS

## 2022-04-15 PROCEDURE — 96372 THER/PROPH/DIAG INJ SC/IM: CPT | Performed by: PEDIATRICS

## 2022-04-15 PROCEDURE — 95874 GUIDE NERV DESTR NEEDLE EMG: CPT | Performed by: STUDENT IN AN ORGANIZED HEALTH CARE EDUCATION/TRAINING PROGRAM

## 2022-04-15 PROCEDURE — 64644 CHEMODENERV 1 EXTREM 5/> MUS: CPT

## 2022-04-15 PROCEDURE — 96361 HYDRATE IV INFUSION ADD-ON: CPT | Mod: 59

## 2022-04-15 PROCEDURE — 64644 CHEMODENERV 1 EXTREM 5/> MUS: CPT | Performed by: STUDENT IN AN ORGANIZED HEALTH CARE EDUCATION/TRAINING PROGRAM

## 2022-04-15 PROCEDURE — 96376 TX/PRO/DX INJ SAME DRUG ADON: CPT | Mod: 59

## 2022-04-15 PROCEDURE — G0463 HOSPITAL OUTPT CLINIC VISIT: HCPCS

## 2022-04-15 PROCEDURE — 258N000003 HC RX IP 258 OP 636: Performed by: PEDIATRICS

## 2022-04-15 RX ORDER — HEPARIN SODIUM (PORCINE) LOCK FLUSH IV SOLN 100 UNIT/ML 100 UNIT/ML
5 SOLUTION INTRAVENOUS
Status: CANCELLED | OUTPATIENT
Start: 2022-07-01

## 2022-04-15 RX ORDER — ONDANSETRON 8 MG/1
8 TABLET, FILM COATED ORAL
Status: CANCELLED
Start: 2022-07-01

## 2022-04-15 RX ORDER — MORPHINE SULFATE 2 MG/ML
2 INJECTION, SOLUTION INTRAMUSCULAR; INTRAVENOUS
Status: CANCELLED
Start: 2022-07-01

## 2022-04-15 RX ORDER — HEPARIN SODIUM,PORCINE 10 UNIT/ML
5 VIAL (ML) INTRAVENOUS
Status: CANCELLED | OUTPATIENT
Start: 2022-07-01

## 2022-04-15 RX ORDER — MORPHINE SULFATE 2 MG/ML
2 INJECTION, SOLUTION INTRAMUSCULAR; INTRAVENOUS
Status: COMPLETED | OUTPATIENT
Start: 2022-04-15 | End: 2022-04-15

## 2022-04-15 RX ORDER — HEPARIN SODIUM (PORCINE) LOCK FLUSH IV SOLN 100 UNIT/ML 100 UNIT/ML
5 SOLUTION INTRAVENOUS
Status: DISCONTINUED | OUTPATIENT
Start: 2022-04-15 | End: 2022-04-15 | Stop reason: HOSPADM

## 2022-04-15 RX ORDER — DIPHENHYDRAMINE HCL 25 MG
25 CAPSULE ORAL
Status: CANCELLED
Start: 2022-07-01

## 2022-04-15 RX ADMIN — Medication 5 ML: at 15:20

## 2022-04-15 RX ADMIN — MORPHINE SULFATE 2 MG: 2 INJECTION, SOLUTION INTRAMUSCULAR; INTRAVENOUS at 14:01

## 2022-04-15 RX ADMIN — ONABOTULINUMTOXINA 200 UNITS: 200 INJECTION, POWDER, LYOPHILIZED, FOR SOLUTION INTRADERMAL; INTRAMUSCULAR at 11:38

## 2022-04-15 RX ADMIN — MORPHINE SULFATE 2 MG: 2 INJECTION, SOLUTION INTRAMUSCULAR; INTRAVENOUS at 12:55

## 2022-04-15 RX ADMIN — MORPHINE SULFATE 2 MG: 2 INJECTION, SOLUTION INTRAMUSCULAR; INTRAVENOUS at 15:05

## 2022-04-15 RX ADMIN — DEXTROSE AND SODIUM CHLORIDE 1000 ML: 5; 450 INJECTION, SOLUTION INTRAVENOUS at 12:51

## 2022-04-15 ASSESSMENT — PAIN SCALES - GENERAL: PAINLEVEL: EXTREME PAIN (9)

## 2022-04-15 NOTE — PATIENT INSTRUCTIONS
Arely Krueger, your RN Care Coordinator, will follow up with you on Monday 4/18 about adding Pain Clinic to your team!      [facility] Triage and after hours / weekends / holidays:  [phone]    Please call the triage or after hours line if you experience a temperature greater than or equal to 100.4, shaking chills, have uncontrolled nausea, vomiting and/or diarrhea, dizziness, shortness of breath, chest pain, bleeding, unexplained bruising, or if you have any other new/concerning symptoms, questions or concerns.      If you are having any concerning symptoms or wish to speak to a provider before your next infusion visit, please call your care coordinator or triage to notify them so we can adequately serve you.     If you need a refill on a narcotic prescription or other medication, please call before your infusion appointment.               Lab Results:  No results found for this or any previous visit (from the past 12 hour(s)).

## 2022-04-15 NOTE — CONFIDENTIAL NOTE
Pt called in to triage requesting IVF pain meds for lower back pain rated 8/10 x 2 days. Stated last took prn oxycodone and MS contin at 6 am  this morning without relief. Denied any fevers, chills, cough, sob, chest pain, numbness or tingling or other symptoms not typical of sickle cell pain.   Pt's last infusion was 4/11/22, last clinic visit 3/21/22 with follow-up on 4/22/22 with Patricia Mantilla .   Patient states they do not have own ride and it will take 60 minutes long to get to cancer clinic.   Pt denied being out of home medications and needing any refills today.   Pt qualifies for sickle cell standing order protocol.  Has Botox injection at 11 am with Dr Katherine Pierce.   If you do not hear from the infusion center by 2pm then you will not be able to get in for an infusion today.   Please note, if you are late for your appt, you risk losing your infusion appt as it may delay another patient's infusion who arrived on time.     [employer] has availability at 1:00 pm for IVF/pain meds     Has transportation to and from appointment with Dr Pierce. No help needed from  for transportation     Message sent to scheduling to schedule 1:00 pm appointment for IVF/pain meds

## 2022-04-15 NOTE — PROGRESS NOTES
Infusion Nursing Note:  Jennifer Cervantes presents today for IVF and pain control.  Patient seen by provider today: No   present during visit today: Not Applicable.    Note: Pt presents to infusion in 9/10 pain in her lower back. She states this is her usual sickle cell pain. She received 1L IVF over 2 hours and Morphine x3. Did not want Zofran or Benadryl. Pain decreased to goal of 6/10 by end of visit, pt comfortable with going home. She has R sided weakness at baseline from hx of stroke.    Message sent to Arely Krueger RNCC about patient's interest in adding pain clinic to her team. Per Arely, she will speak with Dr. Duncan and follow up with pt on Monday. Pt aware.      Intravenous Access:  Implanted Port.    Port was positional - obtained blood return after laying flat and turning head to right side.       Post Infusion Assessment:  Patient tolerated infusion without incident.  Blood return noted pre and post infusion.  Site patent and intact, free from redness, edema or discomfort.  Access discontinued per protocol.       Discharge Plan:   Prescription refills given for oxycodone.  Discharge instructions reviewed with: Patient.  Patient and/or family verbalized understanding of discharge instructions and all questions answered.  AVS to patient via [employer]. No follow up infusion appts at this time.  Patient discharged in stable condition accompanied by: self.  Departure Mode: Ambulatory.      Jaida Kelly RN

## 2022-04-15 NOTE — LETTER
4/15/2022         RE: Jennifer Cervantes  [address]  [facility] [address]        Dear Colleague,    Thank you for referring your patient, Jennifer Cervantes, to the [facility] CANCER CLINIC. Please see a copy of my visit note below.    PM&R Botox Procedure Note    Chief Complaint: Spastic Hemiparesis    /77   Pulse 108   Temp 98.3  F (36.8  C) (Oral)   Wt 77 kg (169 lb 12.8 oz)   SpO2 92%   BMI 29.15 kg/m         Current Outpatient Medications:      acetaminophen (TYLENOL) 325 MG tablet, Take 2 tablets (650 mg) by mouth every 6 hours as needed for mild pain, Disp: 120 tablet, Rfl: 3     albuterol (PROAIR HFA/PROVENTIL HFA/VENTOLIN HFA) 108 (90 Base) MCG/ACT inhaler, Inhale 2 puffs into the lungs every 6 hours as needed for shortness of breath / dyspnea or wheezing, Disp: 8.5 g, Rfl: 3     albuterol (PROVENTIL) (2.5 MG/3ML) 0.083% neb solution, Take 1 vial (2.5 mg) by nebulization every 6 hours as needed for shortness of breath / dyspnea or wheezing, Disp: 12 mL, Rfl: 4     aspirin (ASA) 81 MG chewable tablet, Take 1 tablet (81 mg) by mouth 2 times daily, Disp: 60 tablet, Rfl: 11     budesonide-formoterol (SYMBICORT) 160-4.5 MCG/ACT Inhaler, Inhale 2 puffs into the lungs 2 times daily, Disp: 10.2 g, Rfl: 3     diphenhydrAMINE (BENADRYL) 25 MG capsule, Take 1-2 capsules (25-50 mg) by mouth nightly as needed for sleep, Disp: 60 capsule, Rfl: 3     EPINEPHrine (ANY BX GENERIC EQUIV) 0.3 MG/0.3ML injection 2-pack, Inject 0.3 mLs (0.3 mg) into the muscle as needed for anaphylaxis, Disp: 1 each, Rfl: 1     Hydroxyurea 1000 MG TABS, Take 3,000 mg by mouth daily, Disp: 90 tablet, Rfl: 3     medroxyPROGESTERone (DEPO-PROVERA) 150 MG/ML IM injection, Inject 150 mg into the muscle, Disp: , Rfl:      morphine (MS CONTIN) 15 MG CR tablet, Take 1 tablet (15 mg) by mouth every 12 hours, Disp: 60 tablet, Rfl: 0     ondansetron (ZOFRAN) 8 MG tablet, Take 1 tablet (8 mg) by mouth every 8 hours as needed,  Disp: 30 tablet, Rfl: 1     oxyCODONE IR (ROXICODONE) 15 MG tablet, Take 1 tablet (15 mg) by mouth every 4 hours as needed for severe pain Goal 4 per day. Max 6 per day., Disp: 45 tablet, Rfl: 0    Current Facility-Administered Medications:      botulinum toxin type A (BOTOX) 100 units injection 200 Units, 200 Units, Intramuscular, Q90 Days, Eric Duncan MD, 200 Units at 04/15/22 1138        Allergies   Allergen Reactions     Contrast Dye      Hives and breathing issues     Fish-Derived Products Hives     Seafood Hives     Diagnostic X-Ray Materials      Gadolinium         PHYSICAL EXAM  Motor: 4+/5 with right shoulder abduction, 4/5 with right elbow flexion, unable to assess wrist flexion due to contracture. 4/5 with  strength on right. 5/5 left shoulder abduction, elbow extension, wrist extension and  strength/finger intrinsics. 4/5 with right hip flexion, 4/5 with right knee extension, 3/5 with right ankle dorsiflexion, 4+/5 with right EHL, plantar flexion. 5/5 with all muscle groups in left lower extremity.  ROM is 120 degrees with right shoulder abduction, about 130 degrees with right elbow extension.   Tone with MAS- 2/4 with right shoulder abduction, 3/4 with right finger flexors/intrinsics, 2/4 with right knee flexion. Significant right wrist contracture with minimal extension.  Sensory: intact to light touch throughout all dermatomes in bilateral lower extremities.      HPI  Last set of injections were on 22.  Patient has been ok since her last visit. Continues with range of motion exercises at home.   RESPONSE TO PREVIOUS TREATMENT:  Patient reports significant noticeable improvement in right upper and lower extremity range of motion since her last visit. About 60%. She states that she has not noticed increased wearing off of the effects of the medication.    BOTULINUM NEUROTOXIN INJECTION PROCEDURES:    VERIFICATION OF PATIENT IDENTIFICATION  Responsible Person:  RUIZ  :  verified  Full Name: verified    INDICATIONS FOR PROCEDURES:  Jennifer Cervantes is a 21 year old patient with spasticity affecting the right upper extremity and right lower extremity secondary to a diagnosis of sickle cell disease and previous CVA with resulting spastic hemiparesis with associated pain, loss of joint motion, loss of volitional motor control, hygiene difficulty, difficulty with activities of daily living and difficulty with ambulation and transfers.    Her baseline symptoms have been recalcitrant to oral medications and conservative therapy.  She is here today for reinjection with Botox.    GOAL OF PROCEDURE:  The goal of this procedure is to improve increase active range of motion, improve volitional motor control, decrease pain  and enhance functional independence associated with spasticity.    TOTAL DOSE ADMINISTERED:  Dose Administered:  200 units Botox (Botulinum Toxin Type A)       1:1 Dilution     Diluent Used:  Preservative Free Normal Saline  Total Volume of Diluent Used:  2 ml  Lot # [id number] Expiration: 09/2024    CONSENT:  The risks, benefits, and treatment options were discussed with Jennifer Cervantes and she agreed to proceed.    EQUIPMENT USED:  Needle-37mm stimulating/recording  EMG/NCS Machine    SKIN PREPARATION:  Skin preparation was performed using an alcohol wipe.      GUIDANCE DESCRIPTION:  Electro-myographic guidance was necessary throughout the procedure to accurately identify all areas of spastic muscles while avoiding injection of non-spastic muscles and underlying muscles , neighboring nerves and nearby vascular structures.     AREA/MUSCLE INJECTED:  Right biceps- 40 U at 2 sites  Right BR- 30 U  Right pronator teres- 30 U  Right FDS- 25 U  Right FCR- 25 U  Right biceps femoris- 50 U at 2 sites    RESPONSE TO PROCEDURE:  Jennifer Cervantes tolerated the procedure well and there were no immediate complications. She was allowed to recover for an appropriate period of time and was  discharged home in stable condition.     Physical Therapy referral placed to help with gait and strength in the setting of right hemiparesis.  Orthotics referral placed as patient would like to review options for bracing for her right lower extremity.    Jennifereli Cervantes will follow up by phone with Dr. Pierce for any questions or concerns that may arise.  Jennifer Cervantes will be scheduled for the next series of injections in 12 weeks.          Again, thank you for allowing me to participate in the care of your patient.      Sincerely,    Katherine Pierce MD

## 2022-04-15 NOTE — NURSING NOTE
"Oncology Rooming Note    April 15, 2022 11:10 AM   Jennifer Cervantes is a 22 year old female who presents for:    No chief complaint on file.    Initial Vitals: /77   Pulse 108   Temp 98.3  F (36.8  C) (Oral)   Wt 77 kg (169 lb 12.8 oz)   SpO2 92%   BMI 29.15 kg/m   Estimated body mass index is 29.15 kg/m  as calculated from the following:    Height as of 3/20/22: 1.626 m (5' 4\").    Weight as of this encounter: 77 kg (169 lb 12.8 oz). Body surface area is 1.86 meters squared.  Extreme Pain (9) Comment: Data Unavailable   No LMP recorded. Patient has had an injection.  Allergies reviewed: Yes  Medications reviewed: Yes    Medications: Medication refills not needed today.  Pharmacy name entered into [employer]: [facility] [place], [place] - [id number]    Clinical concerns: none       Vida Peck"

## 2022-04-15 NOTE — PROGRESS NOTES
PM&R Botox Procedure Note    Chief Complaint: Spastic Hemiparesis    /77   Pulse 108   Temp 98.3  F (36.8  C) (Oral)   Wt 77 kg (169 lb 12.8 oz)   SpO2 92%   BMI 29.15 kg/m         Current Outpatient Medications:      acetaminophen (TYLENOL) 325 MG tablet, Take 2 tablets (650 mg) by mouth every 6 hours as needed for mild pain, Disp: 120 tablet, Rfl: 3     albuterol (PROAIR HFA/PROVENTIL HFA/VENTOLIN HFA) 108 (90 Base) MCG/ACT inhaler, Inhale 2 puffs into the lungs every 6 hours as needed for shortness of breath / dyspnea or wheezing, Disp: 8.5 g, Rfl: 3     albuterol (PROVENTIL) (2.5 MG/3ML) 0.083% neb solution, Take 1 vial (2.5 mg) by nebulization every 6 hours as needed for shortness of breath / dyspnea or wheezing, Disp: 12 mL, Rfl: 4     aspirin (ASA) 81 MG chewable tablet, Take 1 tablet (81 mg) by mouth 2 times daily, Disp: 60 tablet, Rfl: 11     budesonide-formoterol (SYMBICORT) 160-4.5 MCG/ACT Inhaler, Inhale 2 puffs into the lungs 2 times daily, Disp: 10.2 g, Rfl: 3     diphenhydrAMINE (BENADRYL) 25 MG capsule, Take 1-2 capsules (25-50 mg) by mouth nightly as needed for sleep, Disp: 60 capsule, Rfl: 3     EPINEPHrine (ANY BX GENERIC EQUIV) 0.3 MG/0.3ML injection 2-pack, Inject 0.3 mLs (0.3 mg) into the muscle as needed for anaphylaxis, Disp: 1 each, Rfl: 1     Hydroxyurea 1000 MG TABS, Take 3,000 mg by mouth daily, Disp: 90 tablet, Rfl: 3     medroxyPROGESTERone (DEPO-PROVERA) 150 MG/ML IM injection, Inject 150 mg into the muscle, Disp: , Rfl:      morphine (MS CONTIN) 15 MG CR tablet, Take 1 tablet (15 mg) by mouth every 12 hours, Disp: 60 tablet, Rfl: 0     ondansetron (ZOFRAN) 8 MG tablet, Take 1 tablet (8 mg) by mouth every 8 hours as needed, Disp: 30 tablet, Rfl: 1     oxyCODONE IR (ROXICODONE) 15 MG tablet, Take 1 tablet (15 mg) by mouth every 4 hours as needed for severe pain Goal 4 per day. Max 6 per day., Disp: 45 tablet, Rfl: 0    Current Facility-Administered Medications:      botulinum  toxin type A (BOTOX) 100 units injection 200 Units, 200 Units, Intramuscular, Q90 Days, Eric Duncan MD, 200 Units at 04/15/22 1138        Allergies   Allergen Reactions     Contrast Dye      Hives and breathing issues     Fish-Derived Products Hives     Seafood Hives     Diagnostic X-Ray Materials      Gadolinium         PHYSICAL EXAM  Motor: 4+/5 with right shoulder abduction, 4/5 with right elbow flexion, unable to assess wrist flexion due to contracture. 4/5 with  strength on right. 5/5 left shoulder abduction, elbow extension, wrist extension and  strength/finger intrinsics. 4/5 with right hip flexion, 4/5 with right knee extension, 3/5 with right ankle dorsiflexion, 4+/5 with right EHL, plantar flexion. 5/5 with all muscle groups in left lower extremity.  ROM is 120 degrees with right shoulder abduction, about 130 degrees with right elbow extension.   Tone with MAS- 2/4 with right shoulder abduction, 3/4 with right finger flexors/intrinsics, 2/4 with right knee flexion. Significant right wrist contracture with minimal extension.  Sensory: intact to light touch throughout all dermatomes in bilateral lower extremities.      HPI  Last set of injections were on 22.  Patient has been ok since her last visit. Continues with range of motion exercises at home.   RESPONSE TO PREVIOUS TREATMENT:  Patient reports significant noticeable improvement in right upper and lower extremity range of motion since her last visit. About 60%. She states that she has not noticed increased wearing off of the effects of the medication.    BOTULINUM NEUROTOXIN INJECTION PROCEDURES:    VERIFICATION OF PATIENT IDENTIFICATION  Responsible Person:  RUIZ  : verified  Full Name: verified    INDICATIONS FOR PROCEDURES:  Jennifer Cervantes is a 21 year old patient with spasticity affecting the right upper extremity and right lower extremity secondary to a diagnosis of sickle cell disease and previous CVA with resulting spastic  hemiparesis with associated pain, loss of joint motion, loss of volitional motor control, hygiene difficulty, difficulty with activities of daily living and difficulty with ambulation and transfers.    Her baseline symptoms have been recalcitrant to oral medications and conservative therapy.  She is here today for reinjection with Botox.    GOAL OF PROCEDURE:  The goal of this procedure is to improve increase active range of motion, improve volitional motor control, decrease pain  and enhance functional independence associated with spasticity.    TOTAL DOSE ADMINISTERED:  Dose Administered:  200 units Botox (Botulinum Toxin Type A)       1:1 Dilution     Diluent Used:  Preservative Free Normal Saline  Total Volume of Diluent Used:  2 ml  Lot # [id number] Expiration: 09/2024    CONSENT:  The risks, benefits, and treatment options were discussed with Jennifer Cervantes and she agreed to proceed.    EQUIPMENT USED:  Needle-37mm stimulating/recording  EMG/NCS Machine    SKIN PREPARATION:  Skin preparation was performed using an alcohol wipe.      GUIDANCE DESCRIPTION:  Electro-myographic guidance was necessary throughout the procedure to accurately identify all areas of spastic muscles while avoiding injection of non-spastic muscles and underlying muscles , neighboring nerves and nearby vascular structures.     AREA/MUSCLE INJECTED:  Right biceps- 40 U at 2 sites  Right BR- 30 U  Right pronator teres- 30 U  Right FDS- 25 U  Right FCR- 25 U  Right biceps femoris- 50 U at 2 sites    RESPONSE TO PROCEDURE:  Jennifer Cervantes tolerated the procedure well and there were no immediate complications. She was allowed to recover for an appropriate period of time and was discharged home in stable condition.     Physical Therapy referral placed to help with gait and strength in the setting of right hemiparesis.  Orthotics referral placed as patient would like to review options for bracing for her right lower extremity.    Jennifer Cervantes will  follow up by phone with Dr. Pierce for any questions or concerns that may arise.  Jennifer PATY Cervantes will be scheduled for the next series of injections in 12 weeks.

## 2022-04-18 ENCOUNTER — NURSE TRIAGE (OUTPATIENT)
Dept: ONCOLOGY | Facility: CLINIC | Age: 22
End: 2022-04-18
Payer: COMMERCIAL

## 2022-04-18 ENCOUNTER — INFUSION THERAPY VISIT (OUTPATIENT)
Dept: INFUSION THERAPY | Facility: CLINIC | Age: 22
End: 2022-04-18
Attending: PEDIATRICS
Payer: COMMERCIAL

## 2022-04-18 ENCOUNTER — PATIENT OUTREACH (OUTPATIENT)
Dept: CARE COORDINATION | Facility: CLINIC | Age: 22
End: 2022-04-18
Payer: COMMERCIAL

## 2022-04-18 ENCOUNTER — PATIENT OUTREACH (OUTPATIENT)
Dept: ONCOLOGY | Facility: CLINIC | Age: 22
End: 2022-04-18

## 2022-04-18 VITALS
DIASTOLIC BLOOD PRESSURE: 87 MMHG | SYSTOLIC BLOOD PRESSURE: 167 MMHG | TEMPERATURE: 98.1 F | RESPIRATION RATE: 18 BRPM | OXYGEN SATURATION: 96 % | HEART RATE: 108 BPM

## 2022-04-18 DIAGNOSIS — D57.1 SICKLE CELL DISEASE WITHOUT CRISIS (H): ICD-10-CM

## 2022-04-18 DIAGNOSIS — G89.4 CHRONIC PAIN SYNDROME: Primary | ICD-10-CM

## 2022-04-18 DIAGNOSIS — D57.00 SICKLE CELL PAIN CRISIS (H): ICD-10-CM

## 2022-04-18 DIAGNOSIS — G81.10 SPASTIC HEMIPLEGIA, UNSPECIFIED ETIOLOGY, UNSPECIFIED LATERALITY (H): Primary | ICD-10-CM

## 2022-04-18 PROCEDURE — 96366 THER/PROPH/DIAG IV INF ADDON: CPT

## 2022-04-18 PROCEDURE — 96365 THER/PROPH/DIAG IV INF INIT: CPT

## 2022-04-18 PROCEDURE — 258N000003 HC RX IP 258 OP 636: Performed by: PEDIATRICS

## 2022-04-18 PROCEDURE — 96375 TX/PRO/DX INJ NEW DRUG ADDON: CPT

## 2022-04-18 PROCEDURE — 250N000011 HC RX IP 250 OP 636: Performed by: PEDIATRICS

## 2022-04-18 PROCEDURE — 96376 TX/PRO/DX INJ SAME DRUG ADON: CPT

## 2022-04-18 RX ORDER — HEPARIN SODIUM (PORCINE) LOCK FLUSH IV SOLN 100 UNIT/ML 100 UNIT/ML
5 SOLUTION INTRAVENOUS
Status: CANCELLED | OUTPATIENT
Start: 2022-07-01

## 2022-04-18 RX ORDER — MORPHINE SULFATE 2 MG/ML
2 INJECTION, SOLUTION INTRAMUSCULAR; INTRAVENOUS
Status: CANCELLED
Start: 2022-07-01

## 2022-04-18 RX ORDER — ONDANSETRON 8 MG/1
8 TABLET, ORALLY DISINTEGRATING ORAL
Status: DISCONTINUED | OUTPATIENT
Start: 2022-04-18 | End: 2022-04-18 | Stop reason: HOSPADM

## 2022-04-18 RX ORDER — MORPHINE SULFATE 2 MG/ML
2 INJECTION, SOLUTION INTRAMUSCULAR; INTRAVENOUS
Status: COMPLETED | OUTPATIENT
Start: 2022-04-18 | End: 2022-04-18

## 2022-04-18 RX ORDER — HEPARIN SODIUM,PORCINE 10 UNIT/ML
5 VIAL (ML) INTRAVENOUS
Status: CANCELLED | OUTPATIENT
Start: 2022-07-01

## 2022-04-18 RX ORDER — DIPHENHYDRAMINE HCL 25 MG
25 CAPSULE ORAL
Status: CANCELLED
Start: 2022-07-01

## 2022-04-18 RX ORDER — ONDANSETRON 8 MG/1
8 TABLET, FILM COATED ORAL
Status: CANCELLED
Start: 2022-07-01

## 2022-04-18 RX ORDER — HEPARIN SODIUM (PORCINE) LOCK FLUSH IV SOLN 100 UNIT/ML 100 UNIT/ML
5 SOLUTION INTRAVENOUS
Status: DISCONTINUED | OUTPATIENT
Start: 2022-04-18 | End: 2022-04-18 | Stop reason: HOSPADM

## 2022-04-18 RX ORDER — DIPHENHYDRAMINE HCL 25 MG
25 CAPSULE ORAL
Status: DISCONTINUED | OUTPATIENT
Start: 2022-04-18 | End: 2022-04-18 | Stop reason: HOSPADM

## 2022-04-18 RX ADMIN — Medication 5 ML: at 12:46

## 2022-04-18 RX ADMIN — DEXTROSE AND SODIUM CHLORIDE 1000 ML: 5; 450 INJECTION, SOLUTION INTRAVENOUS at 10:21

## 2022-04-18 RX ADMIN — MORPHINE SULFATE 2 MG: 2 INJECTION, SOLUTION INTRAMUSCULAR; INTRAVENOUS at 11:34

## 2022-04-18 RX ADMIN — MORPHINE SULFATE 2 MG: 2 INJECTION, SOLUTION INTRAMUSCULAR; INTRAVENOUS at 12:23

## 2022-04-18 RX ADMIN — MORPHINE SULFATE 2 MG: 2 INJECTION, SOLUTION INTRAMUSCULAR; INTRAVENOUS at 10:24

## 2022-04-18 ASSESSMENT — PAIN SCALES - GENERAL: PAINLEVEL: EXTREME PAIN (9)

## 2022-04-18 NOTE — PROGRESS NOTES
[facility]: Cancer Care Short Note                                                                                          Outgoing Call:   Returned patient's call regarding her wanting to get into pain management. Spoke with Dr. Duncan who entered a referral.  Advised to patient someone would contact her to schedule, but if she doesn't hear back by next week, to call and let writer know.      Patient to follow up as scheduled at next appt    Arely Krueger, RN  RN Care Coordinator   [facility]

## 2022-04-18 NOTE — LETTER
4/18/2022         RE: Jennifer Cervantes  [address]  [place] [facility] [address]        Dear Colleague,    Thank you for referring your patient, Jennifer Cervantes, to the [facility] TREATMENT [facility]. Please see a copy of my visit note below.    Infusion Nursing Note:  Jennifer Cervantes presents today for IVf/Pain meds.    Patient seen by provider today: No   present during visit today: Not Applicable.    Note:   -1L of D5 1/2 NS infused over 2 hrs  -Morphine given x3 every 1hr  -Pain decreased from 9/10 pain to 5/10. Pt comfortable going home.    Intravenous Access:  Implanted Port.    Treatment Conditions:  Not Applicable.    Post Infusion Assessment:  Patient tolerated infusion without incident.  Site patent and intact, free from redness, edema or discomfort.  No evidence of extravasations.  Access discontinued per protocol.     Discharge Plan:   AVS to patient via MYCHART.  Patient will return PRN for next appointment.   Patient discharged in stable condition accompanied by: self.  Departure Mode: Ambulatory.    Lisa Jorge RN    BP (!) 143/88   Pulse 112   Temp 98.1  F (36.7  C)   Resp 18   SpO2 93%     Administrations This Visit     dextrose 5% and 0.45% NaCl BOLUS     Admin Date  04/18/2022 Action  New Bag Dose  1,000 mL Rate  500 mL/hr Route  Intravenous Administered By  Lisa Jorge RN          heparin 100 UNIT/ML injection 5 mL     Admin Date  04/18/2022 Action  Given Dose  5 mL Route  Intracatheter Administered By  Lisa Jorge RN          morphine (PF) injection 2 mg     Admin Date  04/18/2022 Action  Given Dose  2 mg Route  Intravenous Administered By  Lisa Jorge RN           Admin Date  04/18/2022 Action  Given Dose  2 mg Route  Intravenous Administered By  Lisa Jorge RN           Admin Date  04/18/2022 Action  Given Dose  2 mg Route  Intravenous Administered By  Lisa Jorge RN                              Again, thank  you for allowing me to participate in the care of your patient.        Sincerely,        [facility]

## 2022-04-18 NOTE — PROGRESS NOTES
Infusion Nursing Note:  Jennifer Cervantes presents today for IVf/Pain meds.    Patient seen by provider today: No   present during visit today: Not Applicable.    Note:   -1L of D5 1/2 NS infused over 2 hrs  -Morphine given x3 every 1hr  -Pain decreased from 9/10 pain to 5/10. Pt comfortable going home.    Intravenous Access:  Implanted Port.    Treatment Conditions:  Not Applicable.    Post Infusion Assessment:  Patient tolerated infusion without incident.  Site patent and intact, free from redness, edema or discomfort.  No evidence of extravasations.  Access discontinued per protocol.     Discharge Plan:   AVS to patient via MYCHART.  Patient will return PRN for next appointment.   Patient discharged in stable condition accompanied by: self.  Departure Mode: Ambulatory.    Lisa Jorge RN    BP (!) 143/88   Pulse 112   Temp 98.1  F (36.7  C)   Resp 18   SpO2 93%     Administrations This Visit     dextrose 5% and 0.45% NaCl BOLUS     Admin Date  04/18/2022 Action  New Bag Dose  1,000 mL Rate  500 mL/hr Route  Intravenous Administered By  Lisa Jorge RN          heparin 100 UNIT/ML injection 5 mL     Admin Date  04/18/2022 Action  Given Dose  5 mL Route  Intracatheter Administered By  Lisa Jorge RN          morphine (PF) injection 2 mg     Admin Date  04/18/2022 Action  Given Dose  2 mg Route  Intravenous Administered By  Lisa Jorge RN           Admin Date  04/18/2022 Action  Given Dose  2 mg Route  Intravenous Administered By  Lisa Jorge RN           Admin Date  04/18/2022 Action  Given Dose  2 mg Route  Intravenous Administered By  Lisa Jorge RN

## 2022-04-18 NOTE — PATIENT INSTRUCTIONS
Dear Jennifer Cervantes    Thank you for choosing [facility] ([facility]) for your infusion.  The following information is a summary of our appointment as well as important reminders.      We look forward in seeing you on your next appointment here at [facility] ([facility]).  Please don t hesitate to call us at [phone] to reschedule any of your appointments or to speak with one of the [facility] registered nurses.  It was a pleasure taking care of you today.    Sincerely,    [facility]  [facility]  [address] [place] [address]  [address]  Phone:  [phone]

## 2022-04-18 NOTE — TELEPHONE ENCOUNTER
[facility] Triage    Pt called in to triage requesting IVF pain meds for back pain rated 8/10  X 1 day. Stated last took oxy and MS contin this morning without relief. Denied any fevers, chills, cough, sob, chest pain or other symptoms.  Assess for confusion, numbness, paralysis, vomiting, headache, vision issues, difficulty walking and/or talking. Pt's last infusion was 4/15/22, last clinic visit 3/21/22 Perla with follow-up on 4/22 with Jose Rafael. Patient states they do not have own ride and it will take 60 minutes to get to cancer clinic. Pt denied being out of home medications and needing any refills today. Pt qualifies for sickle cell standing order protocol.    Placed on waiting list.    Routing to Perla and Arely Krueger     [facility] can see Jennifer at 1000 today for IVF and pain meds.    0821 am Jennifer can make the 1000[facility]SC SW will set up ride, IB sent to scheduling.    Routing to Arely Duncan

## 2022-04-20 ENCOUNTER — ANCILLARY PROCEDURE (OUTPATIENT)
Dept: ULTRASOUND IMAGING | Facility: CLINIC | Age: 22
End: 2022-04-20
Attending: PEDIATRICS
Payer: COMMERCIAL

## 2022-04-20 DIAGNOSIS — M79.621 PAIN OF RIGHT UPPER ARM: ICD-10-CM

## 2022-04-20 PROCEDURE — 93971 EXTREMITY STUDY: CPT | Mod: RT | Performed by: RADIOLOGY

## 2022-04-20 NOTE — PROGRESS NOTES
This is a recent snapshot of the patient's [facility] medical record.  For current drug dose and complete information and questions, call [phone] or In Basket pool, fv home infusion ([id number])  CSN Number:  [id number]

## 2022-04-21 ENCOUNTER — TELEPHONE (OUTPATIENT)
Dept: INTERNAL MEDICINE | Facility: CLINIC | Age: 22
End: 2022-04-21

## 2022-04-21 DIAGNOSIS — Z78.9 USES CONTRACEPTION: ICD-10-CM

## 2022-04-21 DIAGNOSIS — Z30.42 DEPO-PROVERA CONTRACEPTIVE STATUS: Primary | ICD-10-CM

## 2022-04-21 DIAGNOSIS — D57.00 SICKLE CELL PAIN CRISIS (H): ICD-10-CM

## 2022-04-21 RX ORDER — MEDROXYPROGESTERONE ACETATE 150 MG/ML
150 INJECTION, SUSPENSION INTRAMUSCULAR
Qty: 1 ML | Refills: 0 | OUTPATIENT
Start: 2022-04-21 | End: 2022-04-21

## 2022-04-21 RX ORDER — MEDROXYPROGESTERONE ACETATE 150 MG/ML
150 INJECTION, SUSPENSION INTRAMUSCULAR
Status: COMPLETED | OUTPATIENT
Start: 2022-05-16 | End: 2022-05-16

## 2022-04-21 NOTE — CONFIDENTIAL NOTE
Please communicate with patient about:  -- Due date for her depo shot.  -- Request annual visit in June.

## 2022-04-21 NOTE — PROGRESS NOTES
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     She was admitted 2/1/21-2/3/21 with a new PE, started on Rivaroxaban. Switched to Eliquis 3/25/21 with RUE DVT.     She was admitted 4/26/21-5/11/21 with sickle cell pain crisis complicated by worsening PE in setting of low Apixaban levels, acute hypoxic respiratory failure, pneumonia, acute chest syndrome s/p exchange transfusion on 4/30 and 5/4. After 2nd exchange her oxygen requirement dramatically improved from 20L to 1-2L 5/5 and she was off oxygen as of 5/6.      She was admitted 7/13/21-7/25/21 for acute on chronic PE, switched to dabigitran + ASA.      Due to worsening hypoxia she underwent VQ scan 11/10/21 which showed acute on chronic PE for which she was admitted 11/10-11/21. During admission was switched to warfarin. Echo WNL and no signs of pulm HTN on right heart cath. She did receive 1 unit pRBC for hgb 6.4 during admission.    ED visits had decreased although she was utilizing infusion center most days of the week for pain management. Infusion center visits were limited to two times per week starting ~1/24/22.    She was admitted 1/29-1/31/22 for sickle cell pain crisis. She remained subtherapeutic on warfarin alone and was bridged with enoxaparin. Desferal was resumed during this admission and had been continued through home infusion until last month (March 2022)   She was seen today for routine hematology follow-up.     Interval History:  Jennifer is seen for routine follow-up  "in the infusion center today where she is receiving IV fluids and pain medication.  Overall she has minimal physical concerns.  She continues to utilize the infusion center twice a week which she feels is working well for her pain control.  She has been more active over the weekend with her family.  She feels that her increased activity as well as the fluctuations in weather are primarily triggering her pain crises at this point.  She is happy that she has for the most part been able to stay out of the ED recently.  She continues to have difficulty with intermittently blurred vision.  More pronounced when she is reading or writing in a dark room.  She feels that she needs a new prescription for her glasses.  Her Desferal and Jadenu are currently on hold.  She notes that she will intermittently develop what sounds like subcutaneous nodules.  In the past few weeks her mom noticed a lump on her back and she also noticed a \"ball size lump on her right wrist.  At the moment these have both resolved.  She has asked to see pain management and is being arranged to see Dr. Gentile.  Today she reflected on her personal goals.  She states she would like to start college courses within the next year, if not in the fall been January 2023.    Current Outpatient Medications   Medication Sig Dispense Refill     acetaminophen (TYLENOL) 325 MG tablet Take 2 tablets (650 mg) by mouth every 6 hours as needed for mild pain 120 tablet 3     albuterol (PROAIR HFA/PROVENTIL HFA/VENTOLIN HFA) 108 (90 Base) MCG/ACT inhaler Inhale 2 puffs into the lungs every 6 hours as needed for shortness of breath / dyspnea or wheezing 8.5 g 3     albuterol (PROVENTIL) (2.5 MG/3ML) 0.083% neb solution Take 1 vial (2.5 mg) by nebulization every 6 hours as needed for shortness of breath / dyspnea or wheezing 12 mL 4     aspirin (ASA) 81 MG chewable tablet Take 1 tablet (81 mg) by mouth 2 times daily 60 tablet 11     budesonide-formoterol (SYMBICORT) 160-4.5 " MCG/ACT Inhaler Inhale 2 puffs into the lungs 2 times daily 10.2 g 3     diphenhydrAMINE (BENADRYL) 25 MG capsule Take 1-2 capsules (25-50 mg) by mouth nightly as needed for sleep 60 capsule 3     EPINEPHrine (ANY BX GENERIC EQUIV) 0.3 MG/0.3ML injection 2-pack Inject 0.3 mLs (0.3 mg) into the muscle as needed for anaphylaxis 1 each 1     Hydroxyurea 1000 MG TABS Take 3,000 mg by mouth daily 90 tablet 3     morphine (MS CONTIN) 15 MG CR tablet Take 1 tablet (15 mg) by mouth every 12 hours 60 tablet 0     ondansetron (ZOFRAN) 8 MG tablet Take 1 tablet (8 mg) by mouth every 8 hours as needed 30 tablet 1     oxyCODONE IR (ROXICODONE) 15 MG tablet Take 1 tablet (15 mg) by mouth every 4 hours as needed for severe pain Goal 4 per day. Max 6 per day. 45 tablet 0       Past Medical History  Past Medical History:   Diagnosis Date     Anxiety      Bleeding disorder (H)      Blood clotting disorder (H)      Cerebral infarction (H) 2015     Cognitive developmental delay     low IQ. Please recognize when managing pain and planning with her     Depressive disorder      Hemiplegia and hemiparesis following cerebral infarction affecting right dominant side (H)     right hand contractures     Iron overload due to repeated red blood cell transfusions      Migraines      Multiple subsegmental pulmonary emboli without acute cor pulmonale (H) 02/01/2021     Oppositional defiant behavior      Superficial venous thrombosis of arm, right 03/25/2021     Uncomplicated asthma      Past Surgical History:   Procedure Laterality Date     AS INSERT TUNNELED CV 2 CATH W/O PORT/PUMP       CHOLECYSTECTOMY       CV RIGHT HEART CATH MEASUREMENTS RECORDED N/A 11/18/2021    Procedure: Right Heart Cath;  Surgeon: Jackson Stauffer MD;  Location:  HEART CARDIAC CATH LAB     INSERT PORT VASCULAR ACCESS Left 4/21/2021    Procedure: INSERTION, VASCULAR ACCESS PORT (NOT SURE ON SIDE UNTIL REMOVAL);  Surgeon: Rajan More MD;  [place] [facility] [place]       CHEST PORT PLACEMENT > 5 YRS OF AGE  4/21/2021     IR CVC NON TUNNEL LINE REMOVAL  5/6/2021     IR CVC NON TUNNEL PLACEMENT  04/07/2020     IR CVC NON TUNNEL PLACEMENT  4/30/2021     IR PORT REMOVAL LEFT  4/21/2021     REMOVE PORT VASCULAR ACCESS Left 4/21/2021    Procedure: REMOVAL, VASCULAR ACCESS PORT LEFT;  Surgeon: Rajan More MD;  Location: [facility] OR     REPAIR TENDON ELBOW Right 10/02/2019    Procedure: Right Elbow Flexor Lengthening, Flexor Pronator Slide Of Wrist and Finger, Thumb Adductor Lengthening;  Surgeon: Anai Franco MD;  Location: [facility] OR     TONSILLECTOMY Bilateral 10/02/2019    Procedure: Bilateral Tonsillectomy;  Surgeon: Farhana Guy MD;  Location: [facility] OR     ZZC BREAST REDUCTION (INCLUDES LIPO) TIER 3 Bilateral 04/23/2019     Allergies   Allergen Reactions     Contrast Dye      Hives and breathing issues     Fish-Derived Products Hives     Seafood Hives     Diagnostic X-Ray Materials      Gadolinium      Social History   Social History     Tobacco Use     Smoking status: Never Smoker     Smokeless tobacco: Never Used   Substance Use Topics     Alcohol use: Not Currently     Alcohol/week: 0.0 standard drinks     Drug use: Never      Past medical history and social history were reviewed.    Physical Examination:  Vital Signs 4/22/2022   Systolic 138   Diastolic 80   Pulse 102   Temperature 98.4   Respirations 18   Weight (LB)    Height    BMI (Calculated)    Pain Score 9   O2 93       Wt Readings from Last 10 Encounters:   04/15/22 77 kg (169 lb 12.8 oz)   03/30/22 77.1 kg (170 lb)   03/21/22 77.1 kg (169 lb 14.4 oz)   03/20/22 77.1 kg (170 lb)   03/11/22 76.6 kg (168 lb 12.8 oz)   03/06/22 77.6 kg (171 lb)   02/21/22 77.6 kg (171 lb 1.6 oz)   02/17/22 76.4 kg (168 lb 8 oz)   02/13/22 77.1 kg (170 lb)   02/04/22 78.7 kg (173 lb 9.6 oz)     General: Well-appearing female, NAD.  Eyes: EOMI, PERRL. No scleral icterus.  Respiratory:  Normal respiratory effort.   Neurologic:  Cranial nerves II through XII are grossly intact. Contractured right hand 2/2 stroke history  Skin: No rashes, petechiae, or bruising noted on exposed skin. No palpable nodules to back or extremities.  Laboratory Data:   Latest Reference Range & Units 04/22/22 10:36   Sodium 133 - 144 mmol/L 140   Potassium 3.4 - 5.3 mmol/L 3.4   Chloride 94 - 109 mmol/L 111 (H)   Carbon Dioxide 20 - 32 mmol/L 22   Urea Nitrogen 7 - 30 mg/dL 4 (L)   Creatinine 0.52 - 1.04 mg/dL 0.51 (L)   GFR Estimate >60 mL/min/1.73m2 >90 [1]   Calcium 8.5 - 10.1 mg/dL 8.8   Anion Gap 3 - 14 mmol/L 7   Albumin 3.4 - 5.0 g/dL 3.9   Protein Total 6.8 - 8.8 g/dL 7.3   Bilirubin Total 0.2 - 1.3 mg/dL 3.3 (H)   Alkaline Phosphatase 40 - 150 U/L 73   ALT 0 - 50 U/L 61 (H)   AST 0 - 45 U/L 66 (H)   Glucose 70 - 99 mg/dL 107 (H)   WBC 4.0 - 11.0 10e3/uL 9.9   Hemoglobin 11.7 - 15.7 g/dL 7.2 (L)   Hematocrit 35.0 - 47.0 % 21.1 (L)   Platelet Count 150 - 450 10e3/uL 383   RBC Count 3.80 - 5.20 10e6/uL 2.34 (L)   MCV 78 - 100 fL 90   MCH 26.5 - 33.0 pg 30.8   MCHC 31.5 - 36.5 g/dL 34.1   RDW 10.0 - 15.0 % 25.0 (H)   % Neutrophils % 59   % Lymphocytes % 27   % Monocytes % 6   % Eosinophils % 7   % Basophils % 1   Absolute Basophils 0.0 - 0.2 10e3/uL 0.1   NRBC/W <=0 % 9 (H)   Absolute Neutrophil 1.6 - 8.3 10e3/uL 5.8   Absolute Lymphocytes 0.8 - 5.3 10e3/uL 2.7   Absolute Monocytes 0.0 - 1.3 10e3/uL 0.6   Absolute Eosinophils 0.0 - 0.7 10e3/uL 0.7   Absolute NRBCs <=0.0 10e3/uL 0.9 (H)   RBC Morphology  Confirmed RBC Indices   Platelet Morphology Automated Count Confirmed. Platelet morphology is normal.  Automated Count Confirmed. Platelet morphology is normal.   Polychromasia None Seen  Slight !   Sickle Cells None Seen  Moderate !   Target Cells None Seen  Slight !   % Retic 0.5 - 2.0 % 19.0 (H)   Absolute Retic 0.025 - 0.095 10e6/uL 0.432 (H)     Most recent labs reviewed and interpreted by me today.    Assessment and Plan:  1. Sickle Cell HgbSS Disease  2.  Chronic Pain  3. Iron overload  4. Recurrent VTE/PE but inability to remain therapeutic on warfarin  5. History of CVA    Jennifer continues to to do well with pain management, adhering to twice a week infusion center visits and attempting to remain out of the ED as much as possible.  She has been compliant with her medications.  She remains off of anticoagulants and continues aspirin 81 mg twice daily.  Her Desferal and Jadenu have been put on hold due to visual changes.  She is scheduled to see ophthalmology next week.  She is wearing her glasses as much as possible but does not feel this makes a significant difference with her vision.  She feels that she needs an updated prescription.  She has requested to see pain management and will be set up to see Dr. Gentile.  As she feels she has been doing well with managing her pain primarily at home, she again would like to revisit extending her oxycodone prescriptions to 2-week fills at a time.  We discussed that this creates opportunity for oxycodone overuse even if this is unintentional.  As she will be meeting with pain management, further discussion about Suboxone was deferred today.  We did further discuss her goals as outlined below.    Jennifer Cervantes's Goals (discussed 04/22/2022 )     1-3 month goal:  Work on finding a job     6 month goal:  Work on driving     12 month goal:  Start college classes     Disease-specific goal(s):  Continue to stay out of the hospital    _________________________________________________________________________    Sickle Cell Disease Comprehensive Checklist (reviewed today, 4/22/22)    Stroke/silent cerebral infarct Hx (Y/N): Y    Bone Health/Avascular Necrosis Screening/Symptoms (each visit): No concerns today    Leg Ulcer evaluation (every visit): Assessed, not present    Hypertension (every visit): /80 today    Last ophthalmologic exam (annual): Scheduled follow-up 4/28/22    Last urinalysis for microalbuminuria/CKD  (annually): 3/30/22-negative    Last pulmonary evaluation (asthma, AMAN, pulm HTN, Echo every 2-3 years): ECHO 2/28/22    Last PCP Visit: 12/26/21    Vaccines:  o PCV13: 5/13/19  o Pneumovax: 7/12/19, 10/30/09, 3/30/04   o Menactra: 8/16/21, 4/27/10  o Trumemba/Menveo:  o Influenza: ---  o COVID19: Offer at next visit    Plan:  -Continue Hydrea to 3000mg daily to help lessen frequency of sickle cell pain  -Continue MS Contin and Oxycodone at home. Oxycodone refilled today.  -Infusion center visits limited to two times per week. Continue diligent home management with current medications, heat, rest, compression, warm baths.   -If unable to manage at home can go to ED but continue to not do IV narcotics in the emergency room. Ketamine previously added to pain plan in ED  -Continue to hold Desferal and Jadenu for iron overload  -Continue aspirin BID  -RTC in 1 month for ANDREAS visit         60 minutes spent on the date of the encounter doing chart review, review of test results, interpretation of tests, patient visit and documentation     Patricia Mantilla CNP  [facility]  [address] [place] [id number] [phone]

## 2022-04-21 NOTE — TELEPHONE ENCOUNTER
"Refill Request    Date of most recent appointment:  *3/21/22**  Next upcoming appointment:   4/22/22  Prescribing provider(s):  Patricia Mantilla  Person requesting refill:  Jennifer    Medication requested:  Oxycodone 15 mg  Quantity:  45  Last fill date:  4/13/22    Notes:  Pt is wondering if she could  tomorrow since she has an apt here tomorrow  Pain being treated sickle cell pain  Number of pills remaining: \"I have enough, just wondering if I could  early, or if I should wait until Monday.\"  Side effects: Denies  Number of times pt takes per day: did not say   Apt at 1200 with RONALDO Sr    Not  reviewed.      "

## 2022-04-21 NOTE — TELEPHONE ENCOUNTER
New Rx to local pharmacy cancelled.  Patient get injections at clinic.  Depo Provera reorder as clinic-administer (CAM) for 1 dose.      Patient not due for her Depo Provera until 05/16/22 to 05/30/22.    Dr. Case requesting physical annual visit in June also.            Emmett Garcia CMA ([facility]) at 2:09 PM on 4/21/2022

## 2022-04-21 NOTE — TELEPHONE ENCOUNTER
----- Message from Nate Roberto, CMA sent at 4/20/2022  1:09 PM CDT -----  Regarding: Depo Shot  Pt came in to clinic today for a nurse visit to receive Depo shot. To the best of my understanding, there doesn't seem to be an order for the continuing of these shots. Did you want to meet with patient to renew this, or are you willing to sign a new order for her?     Side note, she was too soon to receive the shot today anyway so we do have time before she is due again.     Thanks,  Nate Roberto, EMT at 1:11 PM on 4/20/2022

## 2022-04-22 ENCOUNTER — INFUSION THERAPY VISIT (OUTPATIENT)
Dept: ONCOLOGY | Facility: CLINIC | Age: 22
End: 2022-04-22
Attending: REGISTERED NURSE
Payer: COMMERCIAL

## 2022-04-22 ENCOUNTER — TELEPHONE (OUTPATIENT)
Dept: ONCOLOGY | Facility: CLINIC | Age: 22
End: 2022-04-22
Payer: COMMERCIAL

## 2022-04-22 ENCOUNTER — PATIENT OUTREACH (OUTPATIENT)
Dept: CARE COORDINATION | Facility: CLINIC | Age: 22
End: 2022-04-22
Payer: COMMERCIAL

## 2022-04-22 VITALS
OXYGEN SATURATION: 93 % | TEMPERATURE: 98.4 F | HEART RATE: 102 BPM | SYSTOLIC BLOOD PRESSURE: 138 MMHG | RESPIRATION RATE: 18 BRPM | DIASTOLIC BLOOD PRESSURE: 80 MMHG

## 2022-04-22 DIAGNOSIS — E83.111 IRON OVERLOAD DUE TO REPEATED RED BLOOD CELL TRANSFUSIONS: ICD-10-CM

## 2022-04-22 DIAGNOSIS — D57.00 SICKLE CELL PAIN CRISIS (H): Primary | ICD-10-CM

## 2022-04-22 DIAGNOSIS — D57.00 SICKLE CELL PAIN CRISIS (H): ICD-10-CM

## 2022-04-22 DIAGNOSIS — G81.10 SPASTIC HEMIPLEGIA, UNSPECIFIED ETIOLOGY, UNSPECIFIED LATERALITY (H): Primary | ICD-10-CM

## 2022-04-22 DIAGNOSIS — G89.4 CHRONIC PAIN SYNDROME: ICD-10-CM

## 2022-04-22 LAB
ALBUMIN SERPL-MCNC: 3.9 G/DL (ref 3.4–5)
ALP SERPL-CCNC: 73 U/L (ref 40–150)
ALT SERPL W P-5'-P-CCNC: 61 U/L (ref 0–50)
ANION GAP SERPL CALCULATED.3IONS-SCNC: 7 MMOL/L (ref 3–14)
AST SERPL W P-5'-P-CCNC: 66 U/L (ref 0–45)
BASOPHILS # BLD MANUAL: 0.1 10E3/UL (ref 0–0.2)
BASOPHILS NFR BLD MANUAL: 1 %
BILIRUB SERPL-MCNC: 3.3 MG/DL (ref 0.2–1.3)
BUN SERPL-MCNC: 4 MG/DL (ref 7–30)
CALCIUM SERPL-MCNC: 8.8 MG/DL (ref 8.5–10.1)
CHLORIDE BLD-SCNC: 111 MMOL/L (ref 94–109)
CO2 SERPL-SCNC: 22 MMOL/L (ref 20–32)
CREAT SERPL-MCNC: 0.51 MG/DL (ref 0.52–1.04)
EOSINOPHIL # BLD MANUAL: 0.7 10E3/UL (ref 0–0.7)
EOSINOPHIL NFR BLD MANUAL: 7 %
ERYTHROCYTE [DISTWIDTH] IN BLOOD BY AUTOMATED COUNT: 25 % (ref 10–15)
GFR SERPL CREATININE-BSD FRML MDRD: >90 ML/MIN/1.73M2
GLUCOSE BLD-MCNC: 107 MG/DL (ref 70–99)
HCT VFR BLD AUTO: 21.1 % (ref 35–47)
HGB BLD-MCNC: 7.2 G/DL (ref 11.7–15.7)
LYMPHOCYTES # BLD MANUAL: 2.7 10E3/UL (ref 0.8–5.3)
LYMPHOCYTES NFR BLD MANUAL: 27 %
MCH RBC QN AUTO: 30.8 PG (ref 26.5–33)
MCHC RBC AUTO-ENTMCNC: 34.1 G/DL (ref 31.5–36.5)
MCV RBC AUTO: 90 FL (ref 78–100)
MONOCYTES # BLD MANUAL: 0.6 10E3/UL (ref 0–1.3)
MONOCYTES NFR BLD MANUAL: 6 %
NEUTROPHILS # BLD MANUAL: 5.8 10E3/UL (ref 1.6–8.3)
NEUTROPHILS NFR BLD MANUAL: 59 %
NRBC # BLD AUTO: 0.9 10E3/UL
NRBC BLD MANUAL-RTO: 9 %
PLAT MORPH BLD: ABNORMAL
PLATELET # BLD AUTO: 383 10E3/UL (ref 150–450)
POLYCHROMASIA BLD QL SMEAR: SLIGHT
POTASSIUM BLD-SCNC: 3.4 MMOL/L (ref 3.4–5.3)
PROT SERPL-MCNC: 7.3 G/DL (ref 6.8–8.8)
RBC # BLD AUTO: 2.34 10E6/UL (ref 3.8–5.2)
RBC MORPH BLD: ABNORMAL
RETICS # AUTO: 0.43 10E6/UL (ref 0.03–0.1)
RETICS/RBC NFR AUTO: 19 % (ref 0.5–2)
SICKLE CELLS BLD QL SMEAR: ABNORMAL
SODIUM SERPL-SCNC: 140 MMOL/L (ref 133–144)
TARGETS BLD QL SMEAR: SLIGHT
WBC # BLD AUTO: 9.9 10E3/UL (ref 4–11)

## 2022-04-22 PROCEDURE — 258N000003 HC RX IP 258 OP 636: Performed by: PEDIATRICS

## 2022-04-22 PROCEDURE — 96374 THER/PROPH/DIAG INJ IV PUSH: CPT

## 2022-04-22 PROCEDURE — 80053 COMPREHEN METABOLIC PANEL: CPT | Performed by: REGISTERED NURSE

## 2022-04-22 PROCEDURE — 250N000011 HC RX IP 250 OP 636: Performed by: PEDIATRICS

## 2022-04-22 PROCEDURE — 96375 TX/PRO/DX INJ NEW DRUG ADDON: CPT

## 2022-04-22 PROCEDURE — 96360 HYDRATION IV INFUSION INIT: CPT

## 2022-04-22 PROCEDURE — 85045 AUTOMATED RETICULOCYTE COUNT: CPT | Performed by: REGISTERED NURSE

## 2022-04-22 PROCEDURE — 99215 OFFICE O/P EST HI 40 MIN: CPT | Performed by: REGISTERED NURSE

## 2022-04-22 PROCEDURE — 96376 TX/PRO/DX INJ SAME DRUG ADON: CPT

## 2022-04-22 PROCEDURE — 85027 COMPLETE CBC AUTOMATED: CPT | Performed by: REGISTERED NURSE

## 2022-04-22 PROCEDURE — 96361 HYDRATE IV INFUSION ADD-ON: CPT

## 2022-04-22 RX ORDER — HEPARIN SODIUM (PORCINE) LOCK FLUSH IV SOLN 100 UNIT/ML 100 UNIT/ML
5 SOLUTION INTRAVENOUS
Status: DISCONTINUED | OUTPATIENT
Start: 2022-04-22 | End: 2022-04-22 | Stop reason: HOSPADM

## 2022-04-22 RX ORDER — HEPARIN SODIUM (PORCINE) LOCK FLUSH IV SOLN 100 UNIT/ML 100 UNIT/ML
5 SOLUTION INTRAVENOUS
Status: CANCELLED | OUTPATIENT
Start: 2022-07-01

## 2022-04-22 RX ORDER — ONDANSETRON 8 MG/1
8 TABLET, FILM COATED ORAL
Status: CANCELLED
Start: 2022-07-01

## 2022-04-22 RX ORDER — MORPHINE SULFATE 2 MG/ML
2 INJECTION, SOLUTION INTRAMUSCULAR; INTRAVENOUS
Status: COMPLETED | OUTPATIENT
Start: 2022-04-22 | End: 2022-04-22

## 2022-04-22 RX ORDER — OXYCODONE HYDROCHLORIDE 15 MG/1
15 TABLET ORAL EVERY 4 HOURS PRN
Qty: 45 TABLET | Refills: 0 | Status: SHIPPED | OUTPATIENT
Start: 2022-04-22 | End: 2022-04-29

## 2022-04-22 RX ORDER — MORPHINE SULFATE 2 MG/ML
2 INJECTION, SOLUTION INTRAMUSCULAR; INTRAVENOUS
Status: CANCELLED
Start: 2022-07-01

## 2022-04-22 RX ORDER — HEPARIN SODIUM,PORCINE 10 UNIT/ML
5 VIAL (ML) INTRAVENOUS
Status: CANCELLED | OUTPATIENT
Start: 2022-07-01

## 2022-04-22 RX ORDER — DIPHENHYDRAMINE HCL 25 MG
25 CAPSULE ORAL
Status: CANCELLED
Start: 2022-07-01

## 2022-04-22 RX ADMIN — DEXTROSE AND SODIUM CHLORIDE 1000 ML: 5; 450 INJECTION, SOLUTION INTRAVENOUS at 09:47

## 2022-04-22 RX ADMIN — Medication 5 ML: at 12:48

## 2022-04-22 RX ADMIN — MORPHINE SULFATE 2 MG: 2 INJECTION, SOLUTION INTRAMUSCULAR; INTRAVENOUS at 09:52

## 2022-04-22 RX ADMIN — MORPHINE SULFATE 2 MG: 2 INJECTION, SOLUTION INTRAMUSCULAR; INTRAVENOUS at 12:20

## 2022-04-22 RX ADMIN — MORPHINE SULFATE 2 MG: 2 INJECTION, SOLUTION INTRAMUSCULAR; INTRAVENOUS at 11:11

## 2022-04-22 ASSESSMENT — PAIN SCALES - GENERAL: PAINLEVEL: EXTREME PAIN (9)

## 2022-04-22 NOTE — TELEPHONE ENCOUNTER
Pt called in to triage requesting IVF pain meds for back pain rated 9/10 x 1 days. Stated last took prn oxycodone and ms contin at 0600 this morning without relief. Denied any fevers, chills, cough, sob, chest pain, numbness or tingling or other symptoms not typical of sickle cell pain.   Pt's last infusion was 4/18, last clinic visit 3/21/22 with follow-up on 4/22/22 with Patricia Mantilla CNP.   Patient did request transportation for apt today. Informed pt that she would need to arrange ride for apt at 1200 and we would see if infusion had an opening after her apt. Explained that SW was having difficulty arranging same day rides as insurance companies are not allowing same day requests. Pt verbalized understanding and will arrange ride to apt at 1200 today with Patricia Mantilla CNP.  Oxycodone was pended up yesterday for pt. Has not been signed by provider. Informed pt that provider will likely want to meet with her today before signing. Pt verbalized understanding of this also.    Pt qualifies for sickle cell standing order protocol.    Added to Infusion Wait list.    Jennifer called to inform us that she has an 11 a.m.  arranged.    0757: Infusion can take Jennifer at 0930. Infusions usually take about 3 hours. Jennifer has a ride arranged for  at 1100.   Infusion has no other openings today.  [employer] contacted and will arrange ride using taxi voucher.    Call made to Jennifer, she confirmed she can come to clinic at 0930.  Informed [facility] and [facility] Charge Nurse  IB sent to scheduling.    Routed to Patricia Mantilla CNP and MAURISIO Asencio

## 2022-04-22 NOTE — LETTER
4/22/2022         RE: Jennifer Cervantes  [address]  [facility] [address]        Dear Colleague,    Thank you for referring your patient, Jennifer Cervantes, to the [facility]. Please see a copy of my visit note below.    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     She was admitted 2/1/21-2/3/21 with a new PE, started on Rivaroxaban. Switched to Eliquis 3/25/21 with RUE DVT.     She was admitted 4/26/21-5/11/21 with sickle cell pain crisis complicated by worsening PE in setting of low Apixaban levels, acute hypoxic respiratory failure, pneumonia, acute chest syndrome s/p exchange transfusion on 4/30 and 5/4. After 2nd exchange her oxygen requirement dramatically improved from 20L to 1-2L 5/5 and she was off oxygen as of 5/6.      She was admitted 7/13/21-7/25/21 for acute on chronic PE, switched to dabigitran + ASA.      Due to worsening hypoxia she underwent VQ scan 11/10/21 which showed acute on chronic PE for which she was admitted 11/10-11/21. During admission was switched to warfarin. Echo WNL and no signs of pulm HTN on right heart cath. She did receive 1 unit pRBC for hgb 6.4 during admission.    ED visits had decreased although she was utilizing infusion center most days of the week for pain management. Infusion center visits were limited to two times per week starting ~1/24/22.    She was admitted 1/29-1/31/22 for sickle cell pain crisis. She remained subtherapeutic on warfarin alone and was  "bridged with enoxaparin. Desferal was resumed during this admission and had been continued through home infusion until last month (March 2022)   She was seen today for routine hematology follow-up.     Interval History:  Jennifer is seen for routine follow-up in the infusion center today where she is receiving IV fluids and pain medication.  Overall she has minimal physical concerns.  She continues to utilize the infusion center twice a week which she feels is working well for her pain control.  She has been more active over the weekend with her family.  She feels that her increased activity as well as the fluctuations in weather are primarily triggering her pain crises at this point.  She is happy that she has for the most part been able to stay out of the ED recently.  She continues to have difficulty with intermittently blurred vision.  More pronounced when she is reading or writing in a dark room.  She feels that she needs a new prescription for her glasses.  Her Desferal and Jadenu are currently on hold.  She notes that she will intermittently develop what sounds like subcutaneous nodules.  In the past few weeks her mom noticed a lump on her back and she also noticed a \"ball size lump on her right wrist.  At the moment these have both resolved.  She has asked to see pain management and is being arranged to see Dr. Gentile.  Today she reflected on her personal goals.  She states she would like to start college courses within the next year, if not in the fall been January 2023.    Current Outpatient Medications   Medication Sig Dispense Refill     acetaminophen (TYLENOL) 325 MG tablet Take 2 tablets (650 mg) by mouth every 6 hours as needed for mild pain 120 tablet 3     albuterol (PROAIR HFA/PROVENTIL HFA/VENTOLIN HFA) 108 (90 Base) MCG/ACT inhaler Inhale 2 puffs into the lungs every 6 hours as needed for shortness of breath / dyspnea or wheezing 8.5 g 3     albuterol (PROVENTIL) (2.5 MG/3ML) 0.083% neb solution " Take 1 vial (2.5 mg) by nebulization every 6 hours as needed for shortness of breath / dyspnea or wheezing 12 mL 4     aspirin (ASA) 81 MG chewable tablet Take 1 tablet (81 mg) by mouth 2 times daily 60 tablet 11     budesonide-formoterol (SYMBICORT) 160-4.5 MCG/ACT Inhaler Inhale 2 puffs into the lungs 2 times daily 10.2 g 3     diphenhydrAMINE (BENADRYL) 25 MG capsule Take 1-2 capsules (25-50 mg) by mouth nightly as needed for sleep 60 capsule 3     EPINEPHrine (ANY BX GENERIC EQUIV) 0.3 MG/0.3ML injection 2-pack Inject 0.3 mLs (0.3 mg) into the muscle as needed for anaphylaxis 1 each 1     Hydroxyurea 1000 MG TABS Take 3,000 mg by mouth daily 90 tablet 3     morphine (MS CONTIN) 15 MG CR tablet Take 1 tablet (15 mg) by mouth every 12 hours 60 tablet 0     ondansetron (ZOFRAN) 8 MG tablet Take 1 tablet (8 mg) by mouth every 8 hours as needed 30 tablet 1     oxyCODONE IR (ROXICODONE) 15 MG tablet Take 1 tablet (15 mg) by mouth every 4 hours as needed for severe pain Goal 4 per day. Max 6 per day. 45 tablet 0       Past Medical History  Past Medical History:   Diagnosis Date     Anxiety      Bleeding disorder (H)      Blood clotting disorder (H)      Cerebral infarction (H) 2015     Cognitive developmental delay     low IQ. Please recognize when managing pain and planning with her     Depressive disorder      Hemiplegia and hemiparesis following cerebral infarction affecting right dominant side (H)     right hand contractures     Iron overload due to repeated red blood cell transfusions      Migraines      Multiple subsegmental pulmonary emboli without acute cor pulmonale (H) 02/01/2021     Oppositional defiant behavior      Superficial venous thrombosis of arm, right 03/25/2021     Uncomplicated asthma      Past Surgical History:   Procedure Laterality Date     AS INSERT TUNNELED CV 2 CATH W/O PORT/PUMP       CHOLECYSTECTOMY       CV RIGHT HEART CATH MEASUREMENTS RECORDED N/A 11/18/2021    Procedure: Right Heart  Cath;  Surgeon: Jackson Stauffer MD;  Location:  HEART CARDIAC CATH LAB     INSERT PORT VASCULAR ACCESS Left 4/21/2021    Procedure: INSERTION, VASCULAR ACCESS PORT (NOT SURE ON SIDE UNTIL REMOVAL);  Surgeon: Rajan More MD;  Location: [facility] OR     IR CHEST PORT PLACEMENT > 5 YRS OF AGE  4/21/2021     IR CVC NON TUNNEL LINE REMOVAL  5/6/2021     IR CVC NON TUNNEL PLACEMENT  04/07/2020     IR CVC NON TUNNEL PLACEMENT  4/30/2021     IR PORT REMOVAL LEFT  4/21/2021     REMOVE PORT VASCULAR ACCESS Left 4/21/2021    Procedure: REMOVAL, VASCULAR ACCESS PORT LEFT;  Surgeon: Rajan oMre MD;  Location: [facility] OR     REPAIR TENDON ELBOW Right 10/02/2019    Procedure: Right Elbow Flexor Lengthening, Flexor Pronator Slide Of Wrist and Finger, Thumb Adductor Lengthening;  Surgeon: Anai Franco MD;  Location: [facility] OR     TONSILLECTOMY Bilateral 10/02/2019    Procedure: Bilateral Tonsillectomy;  Surgeon: Farhana Guy MD;  Location: [facility] OR     ZZC BREAST REDUCTION (INCLUDES LIPO) TIER 3 Bilateral 04/23/2019     Allergies   Allergen Reactions     Contrast Dye      Hives and breathing issues     Fish-Derived Products Hives     Seafood Hives     Diagnostic X-Ray Materials      Gadolinium      Social History   Social History     Tobacco Use     Smoking status: Never Smoker     Smokeless tobacco: Never Used   Substance Use Topics     Alcohol use: Not Currently     Alcohol/week: 0.0 standard drinks     Drug use: Never      Past medical history and social history were reviewed.    Physical Examination:  Vital Signs 4/22/2022   Systolic 138   Diastolic 80   Pulse 102   Temperature 98.4   Respirations 18   Weight (LB)    Height    BMI (Calculated)    Pain Score 9   O2 93       Wt Readings from Last 10 Encounters:   04/15/22 77 kg (169 lb 12.8 oz)   03/30/22 77.1 kg (170 lb)   03/21/22 77.1 kg (169 lb 14.4 oz)   03/20/22 77.1 kg (170 lb)   03/11/22 76.6 kg (168 lb 12.8 oz)   03/06/22 77.6 kg (171 lb)   02/21/22  77.6 kg (171 lb 1.6 oz)   02/17/22 76.4 kg (168 lb 8 oz)   02/13/22 77.1 kg (170 lb)   02/04/22 78.7 kg (173 lb 9.6 oz)     General: Well-appearing female, NAD.  Eyes: EOMI, PERRL. No scleral icterus.  Respiratory:  Normal respiratory effort.   Neurologic: Cranial nerves II through XII are grossly intact. Contractured right hand 2/2 stroke history  Skin: No rashes, petechiae, or bruising noted on exposed skin. No palpable nodules to back or extremities.  Laboratory Data:   Latest Reference Range & Units 04/22/22 10:36   Sodium 133 - 144 mmol/L 140   Potassium 3.4 - 5.3 mmol/L 3.4   Chloride 94 - 109 mmol/L 111 (H)   Carbon Dioxide 20 - 32 mmol/L 22   Urea Nitrogen 7 - 30 mg/dL 4 (L)   Creatinine 0.52 - 1.04 mg/dL 0.51 (L)   GFR Estimate >60 mL/min/1.73m2 >90 [1]   Calcium 8.5 - 10.1 mg/dL 8.8   Anion Gap 3 - 14 mmol/L 7   Albumin 3.4 - 5.0 g/dL 3.9   Protein Total 6.8 - 8.8 g/dL 7.3   Bilirubin Total 0.2 - 1.3 mg/dL 3.3 (H)   Alkaline Phosphatase 40 - 150 U/L 73   ALT 0 - 50 U/L 61 (H)   AST 0 - 45 U/L 66 (H)   Glucose 70 - 99 mg/dL 107 (H)   WBC 4.0 - 11.0 10e3/uL 9.9   Hemoglobin 11.7 - 15.7 g/dL 7.2 (L)   Hematocrit 35.0 - 47.0 % 21.1 (L)   Platelet Count 150 - 450 10e3/uL 383   RBC Count 3.80 - 5.20 10e6/uL 2.34 (L)   MCV 78 - 100 fL 90   MCH 26.5 - 33.0 pg 30.8   MCHC 31.5 - 36.5 g/dL 34.1   RDW 10.0 - 15.0 % 25.0 (H)   % Neutrophils % 59   % Lymphocytes % 27   % Monocytes % 6   % Eosinophils % 7   % Basophils % 1   Absolute Basophils 0.0 - 0.2 10e3/uL 0.1   NRBC/W <=0 % 9 (H)   Absolute Neutrophil 1.6 - 8.3 10e3/uL 5.8   Absolute Lymphocytes 0.8 - 5.3 10e3/uL 2.7   Absolute Monocytes 0.0 - 1.3 10e3/uL 0.6   Absolute Eosinophils 0.0 - 0.7 10e3/uL 0.7   Absolute NRBCs <=0.0 10e3/uL 0.9 (H)   RBC Morphology  Confirmed RBC Indices   Platelet Morphology Automated Count Confirmed. Platelet morphology is normal.  Automated Count Confirmed. Platelet morphology is normal.   Polychromasia None Seen  Slight !   Sickle  Cells None Seen  Moderate !   Target Cells None Seen  Slight !   % Retic 0.5 - 2.0 % 19.0 (H)   Absolute Retic 0.025 - 0.095 10e6/uL 0.432 (H)     Most recent labs reviewed and interpreted by me today.    Assessment and Plan:  1. Sickle Cell HgbSS Disease  2. Chronic Pain  3. Iron overload  4. Recurrent VTE/PE but inability to remain therapeutic on warfarin  5. History of CVA    Jennifer continues to to do well with pain management, adhering to twice a week infusion center visits and attempting to remain out of the ED as much as possible.  She has been compliant with her medications.  She remains off of anticoagulants and continues aspirin 81 mg twice daily.  Her Desferal and Jadenu have been put on hold due to visual changes.  She is scheduled to see ophthalmology next week.  She is wearing her glasses as much as possible but does not feel this makes a significant difference with her vision.  She feels that she needs an updated prescription.  She has requested to see pain management and will be set up to see Dr. Gentile.  As she feels she has been doing well with managing her pain primarily at home, she again would like to revisit extending her oxycodone prescriptions to 2-week fills at a time.  We discussed that this creates opportunity for oxycodone overuse even if this is unintentional.  As she will be meeting with pain management, further discussion about Suboxone was deferred today.  We did further discuss her goals as outlined below.    Jennifer Cervantes's Goals (discussed 04/22/2022 )     1-3 month goal:  Work on finding a job     6 month goal:  Work on driving     12 month goal:  Start college classes     Disease-specific goal(s):  Continue to stay out of the hospital    _________________________________________________________________________    Sickle Cell Disease Comprehensive Checklist (reviewed today, 4/22/22)    Stroke/silent cerebral infarct Hx (Y/N): Y    Bone Health/Avascular Necrosis Screening/Symptoms  (each visit): No concerns today    Leg Ulcer evaluation (every visit): Assessed, not present    Hypertension (every visit): /80 today    Last ophthalmologic exam (annual): Scheduled follow-up 4/28/22    Last urinalysis for microalbuminuria/CKD (annually): 3/30/22-negative    Last pulmonary evaluation (asthma, AMAN, pulm HTN, Echo every 2-3 years): ECHO 2/28/22    Last PCP Visit: 12/26/21    Vaccines:  o PCV13: 5/13/19  o Pneumovax: 7/12/19, 10/30/09, 3/30/04   o Menactra: 8/16/21, 4/27/10  o Trumemba/Menveo:  o Influenza: ---  o COVID19: Offer at next visit    Plan:  -Continue Hydrea to 3000mg daily to help lessen frequency of sickle cell pain  -Continue MS Contin and Oxycodone at home. Oxycodone refilled today.  -Infusion center visits limited to two times per week. Continue diligent home management with current medications, heat, rest, compression, warm baths.   -If unable to manage at home can go to ED but continue to not do IV narcotics in the emergency room. Ketamine previously added to pain plan in ED  -Continue to hold Desferal and Jadenu for iron overload  -Continue aspirin BID  -RTC in 1 month for ANDREAS visit     60 minutes spent on the date of the encounter doing chart review, review of test results, interpretation of tests, patient visit and documentation       Again, thank you for allowing me to participate in the care of your patient.      Sincerely,    Patricia Mantilla, CNP

## 2022-04-22 NOTE — PROGRESS NOTES
Infusion Nursing Note:  Jennifer Cervantes presents today for IV fluids and pain meds.    Patient seen by provider today: Yes: Patricia Mantilla NP saw patient in infusion   present during visit today: Not Applicable.    Note:   Patient's pain upon arrival to infusion 9/10.  Patient discharge pain is 6/10 and patient feels comfortable to leave infusion.      Intravenous Access:  Peripheral IV placed.    Treatment Conditions:   Latest Reference Range & Units 04/22/22 10:36   Sodium 133 - 144 mmol/L 140   Potassium 3.4 - 5.3 mmol/L 3.4   Chloride 94 - 109 mmol/L 111 (H)   Carbon Dioxide 20 - 32 mmol/L 22   Urea Nitrogen 7 - 30 mg/dL 4 (L)   Creatinine 0.52 - 1.04 mg/dL 0.51 (L)   GFR Estimate >60 mL/min/1.73m2 >90 [1]   Calcium 8.5 - 10.1 mg/dL 8.8   Anion Gap 3 - 14 mmol/L 7   Albumin 3.4 - 5.0 g/dL 3.9   Protein Total 6.8 - 8.8 g/dL 7.3   Bilirubin Total 0.2 - 1.3 mg/dL 3.3 (H)   Alkaline Phosphatase 40 - 150 U/L 73   ALT 0 - 50 U/L 61 (H)   AST 0 - 45 U/L 66 (H)   Glucose 70 - 99 mg/dL 107 (H)   WBC 4.0 - 11.0 10e3/uL 9.9   Hemoglobin 11.7 - 15.7 g/dL 7.2 (L)   Hematocrit 35.0 - 47.0 % 21.1 (L)   Platelet Count 150 - 450 10e3/uL 383   RBC Count 3.80 - 5.20 10e6/uL 2.34 (L)   MCV 78 - 100 fL 90   MCH 26.5 - 33.0 pg 30.8   MCHC 31.5 - 36.5 g/dL 34.1   RDW 10.0 - 15.0 % 25.0 (H)   % Neutrophils % 59   % Lymphocytes % 27   % Monocytes % 6   % Eosinophils % 7   % Basophils % 1   Absolute Basophils 0.0 - 0.2 10e3/uL 0.1   NRBC/W <=0 % 9 (H)   Absolute Neutrophil 1.6 - 8.3 10e3/uL 5.8   Absolute Lymphocytes 0.8 - 5.3 10e3/uL 2.7   Absolute Monocytes 0.0 - 1.3 10e3/uL 0.6   Absolute Eosinophils 0.0 - 0.7 10e3/uL 0.7         Post Infusion Assessment:  Patient tolerated infusion without incident.  Blood return noted pre and post infusion.  Site patent and intact, free from redness, edema or discomfort.  No evidence of extravasations.  Access discontinued per protocol.       Discharge Plan:   Patient picking up oxycodone  from clinic pharmacy.  Patient and/or family verbalized understanding of discharge instructions and all questions answered.  Copy of AVS reviewed with patient and/or family.  Next appointment to be scheduled.  Patricia Mantilla to place check out note.  Patient discharged in stable condition accompanied by: self.  Departure Mode: Ambulatory.      Criss Carmichael RN

## 2022-04-22 NOTE — PATIENT INSTRUCTIONS
Contact Numbers  [facility]: [phone] (for symptom and scheduling needs)    Please call the [facility] Triage line if you experience a temperature greater than or equal to 100.4, shaking chills, have uncontrolled nausea, vomiting and/or diarrhea, dizziness, shortness of breath, chest pain, bleeding, unexplained bruising, or if you have any other new/concerning symptoms, questions or concerns.     If you are having any concerning symptoms or wish to speak to a provider before your next infusion visit, please call your care coordinator or triage to notify them so we can adequately serve you.     If you need a refill on a narcotic prescription or other medication, please call triage before your infusion appointment.

## 2022-04-22 NOTE — PROGRESS NOTES
Social Work Note: Telephone Call  Oncology Clinic     Data/Intervention:  Patient Name:  Jennifer Cervantes   /Age: 2000, 22 years old      Call From: [facility]         Reason for Call:  Transportation     Assessment:   called [employer] ([phone]) to arrange ride. [employer] arranged  for patient from home with a will call return ride.  Patient will need to call when ready for return ride home ([phone]).      Plan:  Patient is aware of the transportation plan.  available to assist with any other needs.      CARLOS Chavez,[facility]  Hematology/Oncology Social Worker  Phone:[phone] Pager: [phone]

## 2022-04-25 ENCOUNTER — HOME INFUSION (PRE-WILLOW HOME INFUSION) (OUTPATIENT)
Dept: PHARMACY | Facility: CLINIC | Age: 22
End: 2022-04-25

## 2022-04-25 ENCOUNTER — INFUSION THERAPY VISIT (OUTPATIENT)
Dept: TRANSPLANT | Facility: CLINIC | Age: 22
End: 2022-04-25
Attending: PEDIATRICS
Payer: COMMERCIAL

## 2022-04-25 ENCOUNTER — NURSE TRIAGE (OUTPATIENT)
Dept: ONCOLOGY | Facility: CLINIC | Age: 22
End: 2022-04-25
Payer: COMMERCIAL

## 2022-04-25 ENCOUNTER — PATIENT OUTREACH (OUTPATIENT)
Dept: CARE COORDINATION | Facility: CLINIC | Age: 22
End: 2022-04-25
Payer: COMMERCIAL

## 2022-04-25 VITALS
SYSTOLIC BLOOD PRESSURE: 101 MMHG | RESPIRATION RATE: 20 BRPM | TEMPERATURE: 98.9 F | OXYGEN SATURATION: 92 % | DIASTOLIC BLOOD PRESSURE: 75 MMHG | HEART RATE: 114 BPM

## 2022-04-25 DIAGNOSIS — G81.10 SPASTIC HEMIPLEGIA, UNSPECIFIED ETIOLOGY, UNSPECIFIED LATERALITY (H): Primary | ICD-10-CM

## 2022-04-25 DIAGNOSIS — D57.00 SICKLE CELL PAIN CRISIS (H): ICD-10-CM

## 2022-04-25 PROCEDURE — 96374 THER/PROPH/DIAG INJ IV PUSH: CPT

## 2022-04-25 PROCEDURE — 96361 HYDRATE IV INFUSION ADD-ON: CPT

## 2022-04-25 PROCEDURE — 258N000003 HC RX IP 258 OP 636: Performed by: PEDIATRICS

## 2022-04-25 PROCEDURE — 96376 TX/PRO/DX INJ SAME DRUG ADON: CPT

## 2022-04-25 PROCEDURE — 250N000011 HC RX IP 250 OP 636: Performed by: PEDIATRICS

## 2022-04-25 RX ORDER — HEPARIN SODIUM (PORCINE) LOCK FLUSH IV SOLN 100 UNIT/ML 100 UNIT/ML
5 SOLUTION INTRAVENOUS
Status: CANCELLED | OUTPATIENT
Start: 2022-07-01

## 2022-04-25 RX ORDER — HEPARIN SODIUM (PORCINE) LOCK FLUSH IV SOLN 100 UNIT/ML 100 UNIT/ML
5 SOLUTION INTRAVENOUS
Status: DISCONTINUED | OUTPATIENT
Start: 2022-04-25 | End: 2022-04-25 | Stop reason: HOSPADM

## 2022-04-25 RX ORDER — HEPARIN SODIUM,PORCINE 10 UNIT/ML
5 VIAL (ML) INTRAVENOUS
Status: CANCELLED | OUTPATIENT
Start: 2022-07-01

## 2022-04-25 RX ORDER — ONDANSETRON 8 MG/1
8 TABLET, FILM COATED ORAL
Status: CANCELLED
Start: 2022-07-01

## 2022-04-25 RX ORDER — MORPHINE SULFATE 2 MG/ML
2 INJECTION, SOLUTION INTRAMUSCULAR; INTRAVENOUS
Status: CANCELLED
Start: 2022-07-01

## 2022-04-25 RX ORDER — MORPHINE SULFATE 2 MG/ML
2 INJECTION, SOLUTION INTRAMUSCULAR; INTRAVENOUS
Status: DISCONTINUED | OUTPATIENT
Start: 2022-04-25 | End: 2022-04-25 | Stop reason: HOSPADM

## 2022-04-25 RX ORDER — DIPHENHYDRAMINE HCL 25 MG
25 CAPSULE ORAL
Status: CANCELLED
Start: 2022-07-01

## 2022-04-25 RX ADMIN — DEXTROSE AND SODIUM CHLORIDE 1000 ML: 5; 450 INJECTION, SOLUTION INTRAVENOUS at 12:53

## 2022-04-25 RX ADMIN — MORPHINE SULFATE 2 MG: 2 INJECTION, SOLUTION INTRAMUSCULAR; INTRAVENOUS at 12:53

## 2022-04-25 RX ADMIN — MORPHINE SULFATE 2 MG: 2 INJECTION, SOLUTION INTRAMUSCULAR; INTRAVENOUS at 14:09

## 2022-04-25 RX ADMIN — MORPHINE SULFATE 2 MG: 2 INJECTION, SOLUTION INTRAMUSCULAR; INTRAVENOUS at 15:14

## 2022-04-25 ASSESSMENT — PAIN SCALES - GENERAL: PAINLEVEL: EXTREME PAIN (9)

## 2022-04-25 NOTE — PROGRESS NOTES
Infusion Nursing Note:  Jennifer Cervantes presents today for add on IVF/pain meds.    Patient seen by provider today: No   present during visit today: Not Applicable.    Note: No labs/no provider visit. 1L D5 1/2NS given over 2h ours. 2mg IVP Morphine given Q1HR for max of 3 doses, for total dose of 6mg. Patient reported improvement in pain prior to discharge.    Intravenous Access:  Implanted Port.  De-accessed prior to discharge.    Treatment Conditions:  Not Applicable.      Post Infusion Assessment:  Patient tolerated infusion without incident.       Discharge Plan:   Patient discharged in stable condition accompanied by: self.      Allison Wiggins RN

## 2022-04-25 NOTE — LETTER
4/25/2022         RE: Jennifer Cervantes  [address]  [facility] [address]        Dear Colleague,    Thank you for referring your patient, Jennifer Cervantes, to the [facility] BLOOD AND MARROW TRANSPLANT PROGRAM [place]. Please see a copy of my visit note below.    Infusion Nursing Note:  Jennifer Cervantes presents today for add on IVF/pain meds.    Patient seen by provider today: No   present during visit today: Not Applicable.    Note: No labs/no provider visit. 1L D5 1/2NS given over 2h ours. 2mg IVP Morphine given Q1HR for max of 3 doses, for total dose of 6mg. Patient reported improvement in pain prior to discharge.    Intravenous Access:  Implanted Port.  De-accessed prior to discharge.    Treatment Conditions:  Not Applicable.      Post Infusion Assessment:  Patient tolerated infusion without incident.       Discharge Plan:   Patient discharged in stable condition accompanied by: self.      Allison Wiggins RN                          Again, thank you for allowing me to participate in the care of your patient.        Sincerely,        [facility]

## 2022-04-25 NOTE — TELEPHONE ENCOUNTER
[facility] Triage    Incoming call: Jennifer  Pt called in to triage requesting IVF pain meds for back  pain rated 9/10 x 2 days. Stated last took oxy and ms contin at 6 am this morning without relief. Denied any fevers, chills, cough, sob, chest pain or other symptoms. Denies confusion, numbness, paralysis, vomiting, headache, vision issues, difficulty walking and/or talking. Pt's last infusion was 4/22, last clinic visit 4/22 with no  follow-up on the calendar. Patient states they do not have own ride and it will take 60 mionutes to get to cancer clinic. Pt denied being out of home medications and needing any refills today. Pt qualifies for sickle cell standing order protocol. Last labs were 4/22/22.    Placed Jennifer on the waitlist    Routing to Patricia Mantilla    Jennifer has a 1 pm appt in BMT today.  IB to scheduling sent and BMT notified that Jennifer agreed to this appt time.  [facility] SW notified to set up ride.

## 2022-04-25 NOTE — PROGRESS NOTES
Social Work Note: Telephone Call  Oncology Clinic     Data/Intervention:  Patient Name:  Jennifer Cervantes DOB/Age: 2000, 22 years ols     Call From: [facility]        Reason for Call:  Transportation     Assessment:   called [employer] to arrange ride through patient's insurance. [employer] arranged  for patient from home with [employer] ([phone]).  Patient will need to call when ready for return ride home ([phone]).      Plan:  Patient is aware of the transportation plan.  available to assist with any other needs.      CARLOS Chavez,[facility]  Hematology/Oncology Social Worker  Phone:[phone] Pager: [phone]

## 2022-04-27 NOTE — PROGRESS NOTES
This is a recent snapshot of the patient's [facility] medical record.  For current drug dose and complete information and questions, call [phone] or In Basket pool, fv home infusion ([id number])  CSN Number:  [id number]

## 2022-04-28 NOTE — PROGRESS NOTES
This is a recent snapshot of the patient's [facility] medical record.  For current drug dose and complete information and questions, call [phone] or In Basket pool, fv home infusion ([id number])  CSN Number:  [id number]

## 2022-04-28 NOTE — PROGRESS NOTES
This is a recent snapshot of the patient's [facility] medical record.  For current drug dose and complete information and questions, call [phone] or In Basket pool, fv home infusion ([id number])  CSN Number:  [id number]

## 2022-04-29 ENCOUNTER — INFUSION THERAPY VISIT (OUTPATIENT)
Dept: TRANSPLANT | Facility: CLINIC | Age: 22
End: 2022-04-29
Attending: PEDIATRICS
Payer: COMMERCIAL

## 2022-04-29 ENCOUNTER — TELEPHONE (OUTPATIENT)
Dept: ONCOLOGY | Facility: CLINIC | Age: 22
End: 2022-04-29
Payer: COMMERCIAL

## 2022-04-29 ENCOUNTER — PATIENT OUTREACH (OUTPATIENT)
Dept: CARE COORDINATION | Facility: CLINIC | Age: 22
End: 2022-04-29
Payer: COMMERCIAL

## 2022-04-29 VITALS
TEMPERATURE: 98.4 F | HEART RATE: 113 BPM | OXYGEN SATURATION: 91 % | SYSTOLIC BLOOD PRESSURE: 124 MMHG | RESPIRATION RATE: 14 BRPM | DIASTOLIC BLOOD PRESSURE: 78 MMHG

## 2022-04-29 DIAGNOSIS — D57.00 SICKLE CELL PAIN CRISIS (H): ICD-10-CM

## 2022-04-29 DIAGNOSIS — G81.10 SPASTIC HEMIPLEGIA, UNSPECIFIED ETIOLOGY, UNSPECIFIED LATERALITY (H): Primary | ICD-10-CM

## 2022-04-29 PROCEDURE — 96361 HYDRATE IV INFUSION ADD-ON: CPT

## 2022-04-29 PROCEDURE — 96376 TX/PRO/DX INJ SAME DRUG ADON: CPT

## 2022-04-29 PROCEDURE — 250N000011 HC RX IP 250 OP 636: Performed by: PEDIATRICS

## 2022-04-29 PROCEDURE — 96374 THER/PROPH/DIAG INJ IV PUSH: CPT

## 2022-04-29 PROCEDURE — 258N000003 HC RX IP 258 OP 636: Performed by: PEDIATRICS

## 2022-04-29 RX ORDER — HEPARIN SODIUM,PORCINE 10 UNIT/ML
5 VIAL (ML) INTRAVENOUS
Status: CANCELLED | OUTPATIENT
Start: 2022-07-01

## 2022-04-29 RX ORDER — HEPARIN SODIUM (PORCINE) LOCK FLUSH IV SOLN 100 UNIT/ML 100 UNIT/ML
5 SOLUTION INTRAVENOUS
Status: CANCELLED | OUTPATIENT
Start: 2022-07-01

## 2022-04-29 RX ORDER — ONDANSETRON 8 MG/1
8 TABLET, FILM COATED ORAL
Status: CANCELLED
Start: 2022-07-01

## 2022-04-29 RX ORDER — MORPHINE SULFATE 2 MG/ML
2 INJECTION, SOLUTION INTRAMUSCULAR; INTRAVENOUS
Status: CANCELLED
Start: 2022-07-01

## 2022-04-29 RX ORDER — HEPARIN SODIUM (PORCINE) LOCK FLUSH IV SOLN 100 UNIT/ML 100 UNIT/ML
5 SOLUTION INTRAVENOUS
Status: DISCONTINUED | OUTPATIENT
Start: 2022-04-29 | End: 2022-04-29 | Stop reason: HOSPADM

## 2022-04-29 RX ORDER — DIPHENHYDRAMINE HCL 25 MG
25 CAPSULE ORAL
Status: CANCELLED
Start: 2022-07-01

## 2022-04-29 RX ORDER — MORPHINE SULFATE 2 MG/ML
2 INJECTION, SOLUTION INTRAMUSCULAR; INTRAVENOUS
Status: COMPLETED | OUTPATIENT
Start: 2022-04-29 | End: 2022-04-29

## 2022-04-29 RX ORDER — MORPHINE SULFATE 15 MG/1
15 TABLET, FILM COATED, EXTENDED RELEASE ORAL EVERY 12 HOURS
Qty: 60 TABLET | Refills: 0 | Status: SHIPPED | OUTPATIENT
Start: 2022-04-29 | End: 2022-05-25

## 2022-04-29 RX ORDER — OXYCODONE HYDROCHLORIDE 15 MG/1
15 TABLET ORAL EVERY 4 HOURS PRN
Qty: 45 TABLET | Refills: 0 | Status: SHIPPED | OUTPATIENT
Start: 2022-04-29 | End: 2022-05-05

## 2022-04-29 RX ADMIN — MORPHINE SULFATE 2 MG: 2 INJECTION, SOLUTION INTRAMUSCULAR; INTRAVENOUS at 14:52

## 2022-04-29 RX ADMIN — MORPHINE SULFATE 2 MG: 2 INJECTION, SOLUTION INTRAMUSCULAR; INTRAVENOUS at 12:53

## 2022-04-29 RX ADMIN — Medication 5 ML: at 14:58

## 2022-04-29 RX ADMIN — DEXTROSE AND SODIUM CHLORIDE 1000 ML: 5; 450 INJECTION, SOLUTION INTRAVENOUS at 12:45

## 2022-04-29 RX ADMIN — MORPHINE SULFATE 2 MG: 2 INJECTION, SOLUTION INTRAMUSCULAR; INTRAVENOUS at 13:51

## 2022-04-29 ASSESSMENT — PAIN SCALES - GENERAL: PAINLEVEL: EXTREME PAIN (9)

## 2022-04-29 NOTE — LETTER
4/29/2022         RE: Jennifer Cervantes  [address]  [facility] [address]        Dear Colleague,    Thank you for referring your patient, Jennifer Cervantes, to the [facility] BLOOD AND MARROW TRANSPLANT PROGRAM [place]. Please see a copy of my visit note below.    Infusion Nursing Note:  Jennifer Cervantes presents today for add- on infusion.    Patient seen by provider today: No   present during visit today: Not Applicable.    Note: Patient arrived for add-on IVF/pain medication. Reports pain rated 8/10 to low back. Took home pain medication with no pain improvement. Denies chest pain, SOB, N/V, weakness. See ONC toxicity assessment.    Received D5 1/2 NS as ordered and Morphine 2 mg IVP every hour x 3 doses. Reports pain improvement following 3rd dose, rating pain 6/10.      Intravenous Access:  Implanted Port.    Treatment Conditions:  Not Applicable.      Post Infusion Assessment:  Patient tolerated infusion without incident.       Discharge Plan:   Patient discharged in stable condition accompanied by: self.  Departure Mode: Ambulatory.      Jennifer Lai RN                          Again, thank you for allowing me to participate in the care of your patient.        Sincerely,        [facility]

## 2022-04-29 NOTE — PROGRESS NOTES
Social Work Note: Telephone Call  Oncology Clinic     Data/Intervention:  Patient Name:  Jennifer Cervantes   /Age: 2000, 22 years old     Call From: [facility]        Reason for Call:  Transportation      Assessment:   called [employer] to arrange ride through patient's insurance. [employer] arranged  for patient from home with [employer] ([phone]).  Patient will need to call when ready for return ride home ([phone]).      Plan:  Patient is aware of the transportation plan.  available to assist with any other needs.      CARLOS Chavez,[facility]  Hematology/Oncology Social Worker  Phone:[phone] Pager: [phone]

## 2022-04-29 NOTE — CONFIDENTIAL NOTE
Pt called in to triage requesting IVF pain meds for back and hip pain rated 9/10 x 2 days. Stated last took prn oxycodone and MS contin this morning without relief. Denied any fevers, chills, cough, sob, chest pain, numbness or tingling or other symptoms not typical of sickle cell pain.   Pt's last infusion was 4/25/22, last clinic visit 4/22/22 with follow-up on 5/17/22 with Patricia Mantilla .   Patient states they do not have own ride and it will take 60 long to get to cancer clinic.   Pt denied being out of home medications and needing any refills today.   Pt qualifies for sickle cell standing order protocol.  If you do not hear from the infusion center by 2pm then you will not be able to get in for an infusion today.   Please note, if you are late for your appt, you risk losing your infusion appt as it may delay another patient's infusion who arrived on time.     B[facility] can take Jennifer at 1:00pm for IVF/pain     Jennifer confirms appt at 1:00PM     Message sent to  for assistance with transportation.     Message sent to scheduling to schedule

## 2022-04-29 NOTE — PROGRESS NOTES
Infusion Nursing Note:  Jennifer Cervantes presents today for add- on infusion.    Patient seen by provider today: No   present during visit today: Not Applicable.    Note: Patient arrived for add-on IVF/pain medication. Reports pain rated 8/10 to low back. Took home pain medication with no pain improvement. Denies chest pain, SOB, N/V, weakness. See ONC toxicity assessment.    Received D5 1/2 NS as ordered and Morphine 2 mg IVP every hour x 3 doses. Reports pain improvement following 3rd dose, rating pain 6/10.      Intravenous Access:  Implanted Port.    Treatment Conditions:  Not Applicable.      Post Infusion Assessment:  Patient tolerated infusion without incident.       Discharge Plan:   Patient discharged in stable condition accompanied by: self.  Departure Mode: Ambulatory.      Jennifer Lai RN

## 2022-04-29 NOTE — CONFIDENTIAL NOTE
Requesting for Monday refill   Narcotic Refill Request    Medication(s) requested:  MS Contin and Oxycodone   Person Requesting Refill:Jennifer   What pain is the medication treating: Sickle cell pain   How is the medication being taken?:MS Contin takes 1 tab twice daily   Oxycodone 15mg 1 every 4-6 hours   Does pt have enough for today? Yes has enough until Monday   Is pain being adequately controlled on the current regimen?: Yes   Experiencing any side effects from medication?: No     Date of most recent appointment:  4/22/22 Patricia Mantilla   Any No Show Visits:No   Next appointment:   5/17/22 Patricia Mantilla   Last fill date and by whom:  MS wick 3/21/22 Dr Duncan   Oxycodone 4/22/22 Patricia Mantilla    Reviewed: No Access     Routed provider: Patricia Mantilla

## 2022-05-02 ENCOUNTER — TELEPHONE (OUTPATIENT)
Dept: ONCOLOGY | Facility: CLINIC | Age: 22
End: 2022-05-02
Payer: COMMERCIAL

## 2022-05-02 ENCOUNTER — INFUSION THERAPY VISIT (OUTPATIENT)
Dept: ONCOLOGY | Facility: CLINIC | Age: 22
End: 2022-05-02
Attending: PEDIATRICS
Payer: COMMERCIAL

## 2022-05-02 ENCOUNTER — PATIENT OUTREACH (OUTPATIENT)
Dept: CARE COORDINATION | Facility: CLINIC | Age: 22
End: 2022-05-02
Payer: COMMERCIAL

## 2022-05-02 VITALS
OXYGEN SATURATION: 94 % | RESPIRATION RATE: 18 BRPM | HEART RATE: 104 BPM | DIASTOLIC BLOOD PRESSURE: 80 MMHG | TEMPERATURE: 97.9 F | SYSTOLIC BLOOD PRESSURE: 132 MMHG

## 2022-05-02 DIAGNOSIS — G81.10 SPASTIC HEMIPLEGIA, UNSPECIFIED ETIOLOGY, UNSPECIFIED LATERALITY (H): Primary | ICD-10-CM

## 2022-05-02 DIAGNOSIS — D57.00 SICKLE CELL PAIN CRISIS (H): ICD-10-CM

## 2022-05-02 PROCEDURE — 258N000003 HC RX IP 258 OP 636: Performed by: PEDIATRICS

## 2022-05-02 PROCEDURE — 96361 HYDRATE IV INFUSION ADD-ON: CPT

## 2022-05-02 PROCEDURE — 96374 THER/PROPH/DIAG INJ IV PUSH: CPT

## 2022-05-02 PROCEDURE — 250N000011 HC RX IP 250 OP 636: Performed by: PEDIATRICS

## 2022-05-02 PROCEDURE — 96376 TX/PRO/DX INJ SAME DRUG ADON: CPT

## 2022-05-02 RX ORDER — HEPARIN SODIUM (PORCINE) LOCK FLUSH IV SOLN 100 UNIT/ML 100 UNIT/ML
5 SOLUTION INTRAVENOUS
Status: CANCELLED | OUTPATIENT
Start: 2022-07-01

## 2022-05-02 RX ORDER — MORPHINE SULFATE 2 MG/ML
2 INJECTION, SOLUTION INTRAMUSCULAR; INTRAVENOUS
Status: COMPLETED | OUTPATIENT
Start: 2022-05-02 | End: 2022-05-02

## 2022-05-02 RX ORDER — ONDANSETRON 8 MG/1
8 TABLET, FILM COATED ORAL
Status: CANCELLED
Start: 2022-07-01

## 2022-05-02 RX ORDER — HEPARIN SODIUM (PORCINE) LOCK FLUSH IV SOLN 100 UNIT/ML 100 UNIT/ML
5 SOLUTION INTRAVENOUS
Status: DISCONTINUED | OUTPATIENT
Start: 2022-05-02 | End: 2022-05-02 | Stop reason: HOSPADM

## 2022-05-02 RX ORDER — MORPHINE SULFATE 2 MG/ML
2 INJECTION, SOLUTION INTRAMUSCULAR; INTRAVENOUS
Status: CANCELLED
Start: 2022-07-01

## 2022-05-02 RX ORDER — DIPHENHYDRAMINE HCL 25 MG
25 CAPSULE ORAL
Status: CANCELLED
Start: 2022-07-01

## 2022-05-02 RX ORDER — HEPARIN SODIUM,PORCINE 10 UNIT/ML
5 VIAL (ML) INTRAVENOUS
Status: CANCELLED | OUTPATIENT
Start: 2022-07-01

## 2022-05-02 RX ADMIN — MORPHINE SULFATE 2 MG: 2 INJECTION, SOLUTION INTRAMUSCULAR; INTRAVENOUS at 15:47

## 2022-05-02 RX ADMIN — DEXTROSE AND SODIUM CHLORIDE 1000 ML: 5; 450 INJECTION, SOLUTION INTRAVENOUS at 13:37

## 2022-05-02 RX ADMIN — MORPHINE SULFATE 2 MG: 2 INJECTION, SOLUTION INTRAMUSCULAR; INTRAVENOUS at 13:43

## 2022-05-02 RX ADMIN — MORPHINE SULFATE 2 MG: 2 INJECTION, SOLUTION INTRAMUSCULAR; INTRAVENOUS at 14:47

## 2022-05-02 RX ADMIN — Medication 5 ML: at 16:12

## 2022-05-02 ASSESSMENT — PAIN SCALES - GENERAL: PAINLEVEL: WORST PAIN (10)

## 2022-05-02 NOTE — PROGRESS NOTES
Social Work Note: Telephone Call  Oncology Clinic     Data/Intervention:  Patient Name:  Jennifer Cervantes  /Age: 2000, 22 Years Old     Call From: [facility]        Reason for Call:  Transportation     Assessment:   called [facility] [facility] to arrange ride through patient's insurance. [employer] unable to find a cab company that can accommodate patient's ride due to holiday. SW called [employer] to use taxi voucher, but they are also booked for the day. SW called patient to inform them of being unable to arrange ride due to holiday. Patient reported that they can get their own ride to the clinic.      Plan:  [facility]DAY updated [facility] RN's.       CARLOS Chavez,[facility]  Hematology/Oncology Social Worker  Phone:[phone] Pager: [phone]

## 2022-05-02 NOTE — TELEPHONE ENCOUNTER
Pt called in to triage requesting IVF pain meds for pain all over pain rated 10/10 since last night, could not sleep and tossed and turned all night.  Stated last took prn oxycodone and MS contin at 0600 this morning without relief. Denied any fevers, chills, cough, sob, chest pain, numbness or tingling or other symptoms not typical of sickle cell pain.     Pt's last infusion was 4/29/22 , last clinic visit 4/22/22 with follow-up on 5/17/22 with Patricia Mantilla CNP.     Patient states needs help with ride. 25 minutes.    Pt denied being out of home medications and needing any refills today.     Pt qualifies for sickle cell standing order protocol.    If you do not hear from the infusion center by 2pm then you will not be able to get in for an infusion today.     Added to Infusion Wait list.  [facility] can offer 1330 apt to pt.  Called Jennifer and she confirmed she can come at 1330. Needs transportation assistance.   Messaged SW [employer] message that pt needs help with ride for apt at 1330.  Updated Charge nurse that pt confirmed she could come.  Message sent to CCOD to schedule pt.     Routed to Patricia Mantilla CNP and MAURISIO Asencio

## 2022-05-02 NOTE — PATIENT INSTRUCTIONS
[facility] Triage and after hours / weekends / holidays:  [phone]    Please call the triage or after hours line if you experience a temperature greater than or equal to 100.4, shaking chills, have uncontrolled nausea, vomiting and/or diarrhea, dizziness, shortness of breath, chest pain, bleeding, unexplained bruising, or if you have any other new/concerning symptoms, questions or concerns.      If you are having any concerning symptoms or wish to speak to a provider before your next infusion visit, please call your care coordinator or triage to notify them so we can adequately serve you.     If you need a refill on a narcotic prescription or other medication, please call before your infusion appointment.                   Lab Results:  No results found for this or any previous visit (from the past 12 hour(s)).

## 2022-05-02 NOTE — PROGRESS NOTES
"Infusion Nursing Note:  Jennifer Cervantes presents today for IVF and pain medication.  Patient seen by provider today: No   present during visit today: Not Applicable.    Note: Pt presents to infusion in 10/10 pain in her lower back and \"all over\". She describes this as her usual sickle cell pain. She denies SOB, chest pain, nausea or any new concerns.    IVF given over 2 hours. Morphine given every hour x3 doses, pain decreased to 6/10 by end of visit. Pt stated this was acceptable for discharge today.    Intravenous Access:  Implanted Port. No labs this visit.    Treatment Conditions:  Not Applicable.      Post Infusion Assessment:  Patient tolerated infusion without incident.  Blood return noted pre and post infusion.  Site patent and intact, free from redness, edema or discomfort.  No evidence of extravasations.  Access discontinued per protocol.       Discharge Plan:   Patient declined prescription refills.  Discharge instructions reviewed with: Patient.  Patient and/or family verbalized understanding of discharge instructions and all questions answered.  Copy of AVS reviewed with patient and/or family.    Patient discharged in stable condition accompanied by: self.  Departure Mode: Ambulatory.      Jaida Kelly RN                      "

## 2022-05-04 ENCOUNTER — HOME INFUSION (PRE-WILLOW HOME INFUSION) (OUTPATIENT)
Dept: PHARMACY | Facility: CLINIC | Age: 22
End: 2022-05-04
Payer: COMMERCIAL

## 2022-05-05 DIAGNOSIS — D57.00 SICKLE CELL PAIN CRISIS (H): ICD-10-CM

## 2022-05-05 RX ORDER — OXYCODONE HYDROCHLORIDE 15 MG/1
15 TABLET ORAL EVERY 4 HOURS PRN
Qty: 45 TABLET | Refills: 0 | Status: SHIPPED | OUTPATIENT
Start: 2022-05-05 | End: 2022-05-12

## 2022-05-05 NOTE — TELEPHONE ENCOUNTER
Refill Request    Date of most recent appointment:  22  Next upcoming appointment:   22  Prescribing provider(s):  Patricia Mantilla CNP  Person requesting refill:  Jennifer    Medication requested:  Oxycodone 15 mg tablet  Quantity:  45  Last fill date:  22    Notes:  Pt calling in early to have med refilled so she can  when she is out.  Pain being treated Sickle cell pain  Number of pills remainin  When will pt be out of meds: Saturday,   Side effects: Denied  Number of times pt takes per day: Takes q4h, 6 /day  Other She would like to  on Saturday if possible.     Not  reviewed.    Routed to Patricia Mantilla CNP

## 2022-05-06 ENCOUNTER — TELEPHONE (OUTPATIENT)
Dept: ONCOLOGY | Facility: CLINIC | Age: 22
End: 2022-05-06

## 2022-05-06 ENCOUNTER — INFUSION THERAPY VISIT (OUTPATIENT)
Dept: ONCOLOGY | Facility: CLINIC | Age: 22
End: 2022-05-06
Attending: PEDIATRICS
Payer: COMMERCIAL

## 2022-05-06 ENCOUNTER — PATIENT OUTREACH (OUTPATIENT)
Dept: CARE COORDINATION | Facility: CLINIC | Age: 22
End: 2022-05-06
Payer: COMMERCIAL

## 2022-05-06 VITALS
SYSTOLIC BLOOD PRESSURE: 137 MMHG | DIASTOLIC BLOOD PRESSURE: 84 MMHG | RESPIRATION RATE: 18 BRPM | TEMPERATURE: 98.3 F | HEART RATE: 92 BPM | OXYGEN SATURATION: 94 %

## 2022-05-06 DIAGNOSIS — D57.00 SICKLE CELL PAIN CRISIS (H): ICD-10-CM

## 2022-05-06 DIAGNOSIS — G81.10 SPASTIC HEMIPLEGIA, UNSPECIFIED ETIOLOGY, UNSPECIFIED LATERALITY (H): Primary | ICD-10-CM

## 2022-05-06 PROCEDURE — 258N000003 HC RX IP 258 OP 636: Performed by: PEDIATRICS

## 2022-05-06 PROCEDURE — 96361 HYDRATE IV INFUSION ADD-ON: CPT

## 2022-05-06 PROCEDURE — 250N000011 HC RX IP 250 OP 636: Performed by: PEDIATRICS

## 2022-05-06 PROCEDURE — 96374 THER/PROPH/DIAG INJ IV PUSH: CPT

## 2022-05-06 PROCEDURE — 96376 TX/PRO/DX INJ SAME DRUG ADON: CPT

## 2022-05-06 RX ORDER — HEPARIN SODIUM (PORCINE) LOCK FLUSH IV SOLN 100 UNIT/ML 100 UNIT/ML
5 SOLUTION INTRAVENOUS
Status: CANCELLED | OUTPATIENT
Start: 2022-07-01

## 2022-05-06 RX ORDER — MORPHINE SULFATE 2 MG/ML
2 INJECTION, SOLUTION INTRAMUSCULAR; INTRAVENOUS
Status: COMPLETED | OUTPATIENT
Start: 2022-05-06 | End: 2022-05-06

## 2022-05-06 RX ORDER — DIPHENHYDRAMINE HCL 25 MG
25 CAPSULE ORAL
Status: CANCELLED
Start: 2022-07-01

## 2022-05-06 RX ORDER — HEPARIN SODIUM,PORCINE 10 UNIT/ML
5 VIAL (ML) INTRAVENOUS
Status: CANCELLED | OUTPATIENT
Start: 2022-07-01

## 2022-05-06 RX ORDER — DIPHENHYDRAMINE HCL 25 MG
25 CAPSULE ORAL
Status: COMPLETED | OUTPATIENT
Start: 2022-05-06 | End: 2022-05-06

## 2022-05-06 RX ORDER — ONDANSETRON 8 MG/1
8 TABLET, FILM COATED ORAL
Status: CANCELLED
Start: 2022-07-01

## 2022-05-06 RX ORDER — MORPHINE SULFATE 2 MG/ML
2 INJECTION, SOLUTION INTRAMUSCULAR; INTRAVENOUS
Status: CANCELLED
Start: 2022-07-01

## 2022-05-06 RX ORDER — ONDANSETRON 8 MG/1
8 TABLET, FILM COATED ORAL
Status: DISCONTINUED | OUTPATIENT
Start: 2022-05-06 | End: 2022-05-06 | Stop reason: CLARIF

## 2022-05-06 RX ORDER — ONDANSETRON 8 MG/1
8 TABLET, ORALLY DISINTEGRATING ORAL ONCE
Status: COMPLETED | OUTPATIENT
Start: 2022-05-06 | End: 2022-05-06

## 2022-05-06 RX ORDER — HEPARIN SODIUM (PORCINE) LOCK FLUSH IV SOLN 100 UNIT/ML 100 UNIT/ML
5 SOLUTION INTRAVENOUS
Status: DISCONTINUED | OUTPATIENT
Start: 2022-05-06 | End: 2022-05-06 | Stop reason: HOSPADM

## 2022-05-06 RX ADMIN — Medication 5 ML: at 14:31

## 2022-05-06 RX ADMIN — MORPHINE SULFATE 2 MG: 2 INJECTION, SOLUTION INTRAMUSCULAR; INTRAVENOUS at 14:09

## 2022-05-06 RX ADMIN — MORPHINE SULFATE 2 MG: 2 INJECTION, SOLUTION INTRAMUSCULAR; INTRAVENOUS at 11:59

## 2022-05-06 RX ADMIN — MORPHINE SULFATE 2 MG: 2 INJECTION, SOLUTION INTRAMUSCULAR; INTRAVENOUS at 12:57

## 2022-05-06 RX ADMIN — DEXTROSE AND SODIUM CHLORIDE 1000 ML: 5; 450 INJECTION, SOLUTION INTRAVENOUS at 11:59

## 2022-05-06 NOTE — PATIENT INSTRUCTIONS
[facility] Triage and after hours / weekends / holidays:  [phone]    Please call the triage or after hours line if you experience a temperature greater than or equal to 100.4, shaking chills, have uncontrolled nausea, vomiting and/or diarrhea, dizziness, shortness of breath, chest pain, bleeding, unexplained bruising, or if you have any other new/concerning symptoms, questions or concerns.      If you are having any concerning symptoms or wish to speak to a provider before your next infusion visit, please call your care coordinator or triage to notify them so we can adequately serve you.     If you need a refill on a narcotic prescription or other medication, please call before your infusion appointment.                 May 2022      [place] Monday Tuesday Wednesday Thursday Friday Saturday   1     2    ONC INFUSION 2 HR (120 MIN)   1:30 PM   (120 min.)    ONC INFUSION NURSE   [facility] 3     4     5     6    ONC INFUSION 2 HR (120 MIN)  12:00 PM   (120 min.)    ONC INFUSION NURSE   [facility] 7       8     9     10     11     12     13     14       15     16    [facility] NURSE VISIT   9:30 AM   (30 min.)   Nurse,  Pcc   [facility] [place] 17    LAB PERIPHERAL   3:30 PM   (15 min.)   [facility] LAB DRAW   [facility]    RETURN   3:45 PM   (45 min.)   Patricia Mantilla CNP   [facility] 18     19     20     21       22     23     24     25    ADDICTION MED NEW   9:45 AM   (60 min.)   Eric Gentile MD   [facility] [place] 26    HEARING EVALUATION   7:45 AM   (60 min.)   Aide Mack AuD   [facility] 27     28       29     30     31 June 2022 Sunday Monday Tuesday Wednesday Thursday Friday Saturday                  1     2     3     4       5      6     7     8     9     10     11       12     13     14     15    [facility] PHYSICAL  10:15 AM   (30 min.)   Suraj Case MD   [facility] [place] 16     17     18       19     20     21     22     23     24     25       26     27     28     29     30                                 Lab Results:  No results found for this or any previous visit (from the past 12 hour(s)).

## 2022-05-06 NOTE — PROGRESS NOTES
This is a recent snapshot of the patient's [facility] medical record.  For current drug dose and complete information and questions, call [phone] or In Basket pool, fv home infusion ([id number])  CSN Number:  [id number]

## 2022-05-06 NOTE — TELEPHONE ENCOUNTER
Pt called in to triage requesting IVF pain meds for all over pain pain rated 9/10 x 3 days. Stated last took prn oxycodone and MS contin at 0600 this morning without relief. Denied any fevers, chills, cough, sob, chest pain, numbness or tingling or other symptoms not typical of sickle cell pain.     Pt's last infusion was 5/2/22, last clinic visit 4/22 with follow-up on 5/17/22 with Patricia Mantilla CNP.     Patient states needs ride, 25 minutes from clinic.    Pt denied being out of home medications and needing any refills today.     Pt qualifies for sickle cell standing order protocol.    Added to wait list.    [employer] can offer 1200 apt for Jennifer.  Called Jennifer. She confirmed she can come at 1200.  Message sent to CCOD to schedule.  Updated Charge nurse that pt is confirmed for 1200  SW working on ride for pt.

## 2022-05-06 NOTE — PROGRESS NOTES
"Infusion Nursing Note:  Jennifer Cervantes presents today for IVFs, pain meds.    Patient seen by provider today: No   present during visit today: Not Applicable.    Note: Patient presents to infusion feeling ok. \"All over\" discomfort is a 9/10 despite oral medications at home. Heat packs given as well. Patient otherwise denies acute complaints or concerns needing to be addressed today. Specifically, patient denies s/s of infection such as fever, chest pain, shortness of breath, headache, sore throat, or changes in taste/smell.      Intravenous Access:  Implanted Port.    Treatment Conditions:  Not Applicable.      Post Infusion Assessment:  Patient tolerated infusion without incident.  Post IVFs and 3 doses of IV pain meds, pt states 5/10 and specifically states she is comfortable going home.  Blood return noted pre and post infusion.  Site patent and intact, free from redness, edema or discomfort.  No evidence of extravasations.  Access discontinued per protocol.       Discharge Plan:   Prescription refills given for Oxycodone.  Discharge instructions reviewed with: Patient.  Patient and/or family verbalized understanding of discharge instructions and all questions answered.  Copy of AVS reviewed with patient and/or family.  Patient will return 5/17 for next appointment.  Patient discharged in stable condition accompanied by: self.  Departure Mode: Ambulatory.  Face to Face time: 0 miutes.      Rik Ayers RN                      "

## 2022-05-09 ENCOUNTER — INFUSION THERAPY VISIT (OUTPATIENT)
Dept: ONCOLOGY | Facility: CLINIC | Age: 22
End: 2022-05-09
Attending: PEDIATRICS
Payer: COMMERCIAL

## 2022-05-09 ENCOUNTER — PATIENT OUTREACH (OUTPATIENT)
Dept: CARE COORDINATION | Facility: CLINIC | Age: 22
End: 2022-05-09
Payer: COMMERCIAL

## 2022-05-09 ENCOUNTER — NURSE TRIAGE (OUTPATIENT)
Dept: ONCOLOGY | Facility: CLINIC | Age: 22
End: 2022-05-09
Payer: COMMERCIAL

## 2022-05-09 VITALS
RESPIRATION RATE: 18 BRPM | DIASTOLIC BLOOD PRESSURE: 80 MMHG | TEMPERATURE: 98.6 F | SYSTOLIC BLOOD PRESSURE: 141 MMHG | HEART RATE: 111 BPM | OXYGEN SATURATION: 91 %

## 2022-05-09 DIAGNOSIS — D57.00 SICKLE CELL PAIN CRISIS (H): ICD-10-CM

## 2022-05-09 DIAGNOSIS — G81.10 SPASTIC HEMIPLEGIA, UNSPECIFIED ETIOLOGY, UNSPECIFIED LATERALITY (H): Primary | ICD-10-CM

## 2022-05-09 PROCEDURE — 96376 TX/PRO/DX INJ SAME DRUG ADON: CPT

## 2022-05-09 PROCEDURE — 96361 HYDRATE IV INFUSION ADD-ON: CPT

## 2022-05-09 PROCEDURE — 250N000011 HC RX IP 250 OP 636: Performed by: PEDIATRICS

## 2022-05-09 PROCEDURE — 258N000003 HC RX IP 258 OP 636: Performed by: PEDIATRICS

## 2022-05-09 PROCEDURE — 96374 THER/PROPH/DIAG INJ IV PUSH: CPT

## 2022-05-09 RX ORDER — MORPHINE SULFATE 2 MG/ML
2 INJECTION, SOLUTION INTRAMUSCULAR; INTRAVENOUS
Status: COMPLETED | OUTPATIENT
Start: 2022-05-09 | End: 2022-05-09

## 2022-05-09 RX ORDER — HEPARIN SODIUM (PORCINE) LOCK FLUSH IV SOLN 100 UNIT/ML 100 UNIT/ML
5 SOLUTION INTRAVENOUS
Status: DISCONTINUED | OUTPATIENT
Start: 2022-05-09 | End: 2022-05-09 | Stop reason: HOSPADM

## 2022-05-09 RX ORDER — MORPHINE SULFATE 2 MG/ML
2 INJECTION, SOLUTION INTRAMUSCULAR; INTRAVENOUS
Status: CANCELLED
Start: 2022-07-01

## 2022-05-09 RX ORDER — HEPARIN SODIUM (PORCINE) LOCK FLUSH IV SOLN 100 UNIT/ML 100 UNIT/ML
5 SOLUTION INTRAVENOUS
Status: CANCELLED | OUTPATIENT
Start: 2022-07-01

## 2022-05-09 RX ORDER — HEPARIN SODIUM,PORCINE 10 UNIT/ML
5 VIAL (ML) INTRAVENOUS
Status: CANCELLED | OUTPATIENT
Start: 2022-07-01

## 2022-05-09 RX ORDER — DIPHENHYDRAMINE HCL 25 MG
25 CAPSULE ORAL
Status: CANCELLED
Start: 2022-07-01

## 2022-05-09 RX ORDER — ONDANSETRON 8 MG/1
8 TABLET, FILM COATED ORAL
Status: CANCELLED
Start: 2022-07-01

## 2022-05-09 RX ADMIN — MORPHINE SULFATE 2 MG: 2 INJECTION, SOLUTION INTRAMUSCULAR; INTRAVENOUS at 12:50

## 2022-05-09 RX ADMIN — Medication 5 ML: at 13:21

## 2022-05-09 RX ADMIN — MORPHINE SULFATE 2 MG: 2 INJECTION, SOLUTION INTRAMUSCULAR; INTRAVENOUS at 10:49

## 2022-05-09 RX ADMIN — DEXTROSE AND SODIUM CHLORIDE 1000 ML: 5; 450 INJECTION, SOLUTION INTRAVENOUS at 10:48

## 2022-05-09 RX ADMIN — MORPHINE SULFATE 2 MG: 2 INJECTION, SOLUTION INTRAMUSCULAR; INTRAVENOUS at 11:49

## 2022-05-09 ASSESSMENT — PAIN SCALES - GENERAL: PAINLEVEL: EXTREME PAIN (9)

## 2022-05-09 NOTE — PROGRESS NOTES
Infusion Nursing Note:  Jennifer Cervantes presents today for IV fluids + pain medications.    Patient seen by provider today: No   present during visit today: Not Applicable.    Note: Jennifer presents today with 9/10 generalized pain, not managed with home medications. Denies nausea/vomiting, eating well. Denies fevers/chills. Offers no other concerns at today's visit.    After 2 mg IV morphine x3 and IV fluids, Jennifer reports her pain at 5/10, and feels comfortable going home to manage her pain. She has a ride home, and will contact clinic triage or report to ED if pain continues to go unmanaged at home.      Intravenous Access:  Implanted Port.    Treatment Conditions:  Not Applicable.      Post Infusion Assessment:  Patient tolerated infusion without incident.  Blood return noted pre and post infusion.  Site patent and intact, free from redness, edema or discomfort.  No evidence of extravasations.  Access discontinued per protocol.       Discharge Plan:   Patient declined prescription refills.  Discharge instructions reviewed with: Patient.  Patient and/or family verbalized understanding of discharge instructions and all questions answered.  Patient declined copy of AVS today.  Patient will return 05/17 for next appointment with Patricia Mantilla CNP.   Patient discharged in stable condition accompanied by: self.  Departure Mode: Ambulatory.      Gretchen Cruz RN

## 2022-05-09 NOTE — PROGRESS NOTES
Social Work Note: Telephone Call  Oncology Clinic     Data/Intervention:  Patient Name:  Jennifer Cervantes  /Age: 2000, 22 years old     Call From: [facility]        Reason for Call:  Transportation     Assessment:   called [employer] to arrange ride through patient's insurance. [employer] arranged  for patient from home with [employer] ([phone]).  Patient will need to call when ready for return ride home ([phone]).      Plan:  Patient is aware of the transportation plan.  available to assist with any other needs.      CARLOS Chavez,UDAY  Hematology/Oncology Social Worker  Phone:[phone] Pager: [phone]

## 2022-05-09 NOTE — TELEPHONE ENCOUNTER
"Oncology Nurse Triage - Sickle Cell Pain Crisis:    Treating Provider:   David Duncan    Date of last office visit: 04/22/2022    Did they no show to last appointment: No     Date of last infusion: 05/06/2022    Onset of symptoms: \"over the weekend\"     Duration of symptoms: 2 day    Does pain feel like patients typical sickle cell pain? Yes     Have home medications been tried:  Yes    Last home medication taken and when: 0600 this morning    Number of doses of home medications have been attempted and when was first dose taken:  1 for today    Other home therapies attempted: None     Is patient out of home medications: No     Has patient been seen in ED or infusion in the last 3 days? (if yes, when): No    Does patient have active treatment plan?  Yes      Symptom assessment:    Patient reported \"all over\" pain.    Fever (if yes max temperature recorded in last 24 hours):  No    Chest Pain Present (if yes when?): No     Shortness of breath: No     Additional information (if necessary): no numbness and tingling in extremities          Grades for reference:       Grade 1 -   o Patient has active treatment plan.   o Patients last scheduled clinic appointment was attended  o Patient has not been seen in infusion or ED in the last 3 days (clarify exclusions in therapy plans)   o Afebrile   o Typically sickle cell pain   o Home medications have been tried   o No other symptoms       Grade 2 -   o Temperature of over 100.0   o Different sickle cell pain   o Decreased PO intake   o Arm or left swelling   o ED or infusion visit within the last 3 days.   o Out of home medications      Grade 3 -   o New chest pain   o New shortness of breath  o Change in mental status     Recommendations:     Approved per protocol  Scheduled for 11am        "

## 2022-05-09 NOTE — PATIENT INSTRUCTIONS
[facility] Triage Nurse Line: [phone]    Call triage nurse with chills and/or temperature greater than or equal to 100.4, uncontrolled nausea/vomiting, diarrhea, constipation, dizziness, shortness of breath, chest pain, bleeding, unexplained bruising, or any new/concerning symptoms, questions/concerns.     If you are having any concerning symptoms or wish to speak to a provider before your next infusion visit, please call your care coordinator or triage to notify them so we can adequately serve you.

## 2022-05-12 DIAGNOSIS — D57.00 SICKLE CELL PAIN CRISIS (H): ICD-10-CM

## 2022-05-12 RX ORDER — OXYCODONE HYDROCHLORIDE 15 MG/1
15 TABLET ORAL EVERY 4 HOURS PRN
Qty: 45 TABLET | Refills: 0 | Status: SHIPPED | OUTPATIENT
Start: 2022-05-12 | End: 2022-05-19

## 2022-05-12 NOTE — TELEPHONE ENCOUNTER
Narcotic Refill Request    Medication(s) requested:  Oxycodone   Person Requesting Refill:Jennifer (pt)  What pain is the medication treating: generalized, more in back  How is the medication being taken?:6 tablets daily  Does pt have enough for today? Okay but will need by weekend.   Is pain being adequately controlled on the current regimen?: Yes  Experiencing any side effects from medication?: Denies    Date of most recent appointment:  4/22/22  Any No Show Visits:none recently  Next appointment:   5/17/22 Patricia Mantilla CNP  Last fill date and by whom:  5/5/22 Patricia Mantilla CNP   Reviewed:   Routed provider: Patricia Mantilla CNP

## 2022-05-13 ENCOUNTER — TELEPHONE (OUTPATIENT)
Dept: ONCOLOGY | Facility: CLINIC | Age: 22
End: 2022-05-13
Payer: COMMERCIAL

## 2022-05-13 ENCOUNTER — INFUSION THERAPY VISIT (OUTPATIENT)
Dept: TRANSPLANT | Facility: CLINIC | Age: 22
End: 2022-05-13
Attending: PEDIATRICS
Payer: COMMERCIAL

## 2022-05-13 VITALS
OXYGEN SATURATION: 91 % | SYSTOLIC BLOOD PRESSURE: 135 MMHG | RESPIRATION RATE: 14 BRPM | TEMPERATURE: 98.7 F | HEART RATE: 113 BPM | DIASTOLIC BLOOD PRESSURE: 82 MMHG

## 2022-05-13 DIAGNOSIS — G81.10 SPASTIC HEMIPLEGIA, UNSPECIFIED ETIOLOGY, UNSPECIFIED LATERALITY (H): Primary | ICD-10-CM

## 2022-05-13 DIAGNOSIS — D57.00 SICKLE CELL PAIN CRISIS (H): ICD-10-CM

## 2022-05-13 PROCEDURE — 250N000011 HC RX IP 250 OP 636: Performed by: PEDIATRICS

## 2022-05-13 PROCEDURE — 96374 THER/PROPH/DIAG INJ IV PUSH: CPT

## 2022-05-13 PROCEDURE — 96361 HYDRATE IV INFUSION ADD-ON: CPT

## 2022-05-13 PROCEDURE — 96376 TX/PRO/DX INJ SAME DRUG ADON: CPT

## 2022-05-13 PROCEDURE — 258N000003 HC RX IP 258 OP 636: Performed by: PEDIATRICS

## 2022-05-13 RX ORDER — MORPHINE SULFATE 2 MG/ML
2 INJECTION, SOLUTION INTRAMUSCULAR; INTRAVENOUS
Status: DISCONTINUED | OUTPATIENT
Start: 2022-05-13 | End: 2022-05-13 | Stop reason: HOSPADM

## 2022-05-13 RX ORDER — HEPARIN SODIUM,PORCINE 10 UNIT/ML
5 VIAL (ML) INTRAVENOUS
Status: CANCELLED | OUTPATIENT
Start: 2022-07-01

## 2022-05-13 RX ORDER — DIPHENHYDRAMINE HCL 25 MG
25 CAPSULE ORAL
Status: CANCELLED
Start: 2022-07-01

## 2022-05-13 RX ORDER — MORPHINE SULFATE 2 MG/ML
2 INJECTION, SOLUTION INTRAMUSCULAR; INTRAVENOUS
Status: CANCELLED
Start: 2022-07-01

## 2022-05-13 RX ORDER — ONDANSETRON 8 MG/1
8 TABLET, FILM COATED ORAL
Status: CANCELLED
Start: 2022-07-01

## 2022-05-13 RX ORDER — HEPARIN SODIUM (PORCINE) LOCK FLUSH IV SOLN 100 UNIT/ML 100 UNIT/ML
5 SOLUTION INTRAVENOUS
Status: CANCELLED | OUTPATIENT
Start: 2022-07-01

## 2022-05-13 RX ADMIN — MORPHINE SULFATE 2 MG: 2 INJECTION, SOLUTION INTRAMUSCULAR; INTRAVENOUS at 11:03

## 2022-05-13 RX ADMIN — MORPHINE SULFATE 2 MG: 2 INJECTION, SOLUTION INTRAMUSCULAR; INTRAVENOUS at 08:46

## 2022-05-13 RX ADMIN — MORPHINE SULFATE 2 MG: 2 INJECTION, SOLUTION INTRAMUSCULAR; INTRAVENOUS at 09:47

## 2022-05-13 RX ADMIN — ALTEPLASE 2 MG: 2.2 INJECTION, POWDER, LYOPHILIZED, FOR SOLUTION INTRAVENOUS at 11:03

## 2022-05-13 RX ADMIN — DEXTROSE AND SODIUM CHLORIDE 1000 ML: 5; 450 INJECTION, SOLUTION INTRAVENOUS at 08:42

## 2022-05-13 ASSESSMENT — PAIN SCALES - GENERAL: PAINLEVEL: EXTREME PAIN (9)

## 2022-05-13 NOTE — TELEPHONE ENCOUNTER
Pt called in to triage requesting IVF pain meds for lower back pain rated 9/10 x 1 days. Stated last took prn oxycodone and ms contin at 0600 this morning without relief. Denied any fevers, chills, cough, sob, chest pain, numbness or tingling or other symptoms not typical of sickle cell pain.     Pt's last infusion was 5/9/22, last clinic visit 4/22/22 with follow-up on 5/17/22 with Patricia Mantilla CNP.     Patient states needs transportation, 25 minutes. Mom's car is not working so she doesn't have a ride and needs a cab.    Pt denied being out of home medications and needing any refills today.     Pt qualifies for sickle cell standing order protocol.    Added to infusion wait list.    [facility] can offer 0830 or 0900 apt.    Called Jennifer who confirmed she can come to the apt. Jennifer called [employer] and they told her they could have a cab to her house in 30-40 minutes (8:10-8:20). This writer will notify SW at 0800 re: possible need for transportation if Jennifer's ride does not come through. Jennifer states she will let us know asap if her ride does not come as planned.    Message sent to CCOD to schedule at 0900.    Updated [facility] infusion Charge Nurse that pt is able to come at 0900    Routed to Patricia Mantilla CNP and MAURISIO Asencio

## 2022-05-13 NOTE — PROGRESS NOTES
"Infusion Nursing Note:  Jennifer Cervantes presents today for add on IVF/ pain medication.    Patient seen by provider today: No   present during visit today: Not Applicable.    Note: Patient arrived for IVF/pain medication. Reports \"all over\" pain worse to lower back, rated 9/10. Took home MS contin and oxycodone at 0600 with no pain improvements. Denies chest pain, SOB, N/V, dizziness. ONC toxicity assessment completed.     Received 1L D5 1/2 NS over 2 hours and Morphine 2 mg IVP every hour x 3 doses. Patient reported pain improvement at 6/10.     Alteplase 2 mg IVP administered to port lumen. Following 30 minute dwell time 10 mL blood and alteplase aspirated. Positive blood return from port.       Intravenous Access:  Implanted Port.    Treatment Conditions:  Not Applicable.      Post Infusion Assessment:  Patient tolerated infusion without incident.       Discharge Plan:   Patient discharged in stable condition accompanied by: self.  Departure Mode: Ambulatory.      Jennifer Lai RN                      "

## 2022-05-13 NOTE — LETTER
"    5/13/2022         RE: Jennifer Cervantes  [address]  [place] [address]        Dear Colleague,    Thank you for referring your patient, Jennifer Cervantes, to the [facility] [place]. Please see a copy of my visit note below.    Infusion Nursing Note:  Jennifer Cervantes presents today for add on IVF/ pain medication.    Patient seen by provider today: No   present during visit today: Not Applicable.    Note: Patient arrived for IVF/pain medication. Reports \"all over\" pain worse to lower back, rated 9/10. Took home MS contin and oxycodone at 0600 with no pain improvements. Denies chest pain, SOB, N/V, dizziness. ONC toxicity assessment completed.     Received 1L D5 1/2 NS over 2 hours and Morphine 2 mg IVP every hour x 3 doses. Patient reported pain improvement at 6/10.     Alteplase 2 mg IVP administered to port lumen. Following 30 minute dwell time 10 mL blood and alteplase aspirated. Positive blood return from port.       Intravenous Access:  Implanted Port.    Treatment Conditions:  Not Applicable.      Post Infusion Assessment:  Patient tolerated infusion without incident.       Discharge Plan:   Patient discharged in stable condition accompanied by: self.  Departure Mode: Ambulatory.      Jennifer Lai RN                          Again, thank you for allowing me to participate in the care of your patient.        Sincerely,        [facility] [place]    "

## 2022-05-16 ENCOUNTER — ALLIED HEALTH/NURSE VISIT (OUTPATIENT)
Dept: INTERNAL MEDICINE | Facility: CLINIC | Age: 22
End: 2022-05-16
Payer: COMMERCIAL

## 2022-05-16 ENCOUNTER — INFUSION THERAPY VISIT (OUTPATIENT)
Dept: TRANSPLANT | Facility: CLINIC | Age: 22
End: 2022-05-16
Attending: PEDIATRICS
Payer: COMMERCIAL

## 2022-05-16 ENCOUNTER — TELEPHONE (OUTPATIENT)
Dept: ONCOLOGY | Facility: CLINIC | Age: 22
End: 2022-05-16
Payer: COMMERCIAL

## 2022-05-16 VITALS
TEMPERATURE: 98.6 F | HEART RATE: 106 BPM | OXYGEN SATURATION: 90 % | DIASTOLIC BLOOD PRESSURE: 87 MMHG | RESPIRATION RATE: 18 BRPM | SYSTOLIC BLOOD PRESSURE: 131 MMHG

## 2022-05-16 DIAGNOSIS — Z30.42 DEPO-PROVERA CONTRACEPTIVE STATUS: Primary | ICD-10-CM

## 2022-05-16 DIAGNOSIS — D57.00 SICKLE CELL PAIN CRISIS (H): ICD-10-CM

## 2022-05-16 DIAGNOSIS — G81.10 SPASTIC HEMIPLEGIA, UNSPECIFIED ETIOLOGY, UNSPECIFIED LATERALITY (H): Primary | ICD-10-CM

## 2022-05-16 PROCEDURE — 99207 PR NO CHARGE INJECTABLE MED/DRUG: CPT

## 2022-05-16 PROCEDURE — 96361 HYDRATE IV INFUSION ADD-ON: CPT

## 2022-05-16 PROCEDURE — 96376 TX/PRO/DX INJ SAME DRUG ADON: CPT

## 2022-05-16 PROCEDURE — 96374 THER/PROPH/DIAG INJ IV PUSH: CPT

## 2022-05-16 PROCEDURE — 250N000011 HC RX IP 250 OP 636: Performed by: PEDIATRICS

## 2022-05-16 PROCEDURE — 258N000003 HC RX IP 258 OP 636: Performed by: PEDIATRICS

## 2022-05-16 PROCEDURE — 96372 THER/PROPH/DIAG INJ SC/IM: CPT

## 2022-05-16 RX ORDER — MORPHINE SULFATE 2 MG/ML
2 INJECTION, SOLUTION INTRAMUSCULAR; INTRAVENOUS
Status: CANCELLED
Start: 2022-07-01

## 2022-05-16 RX ORDER — ONDANSETRON 8 MG/1
8 TABLET, FILM COATED ORAL
Status: CANCELLED
Start: 2022-07-01

## 2022-05-16 RX ORDER — DIPHENHYDRAMINE HCL 25 MG
25 CAPSULE ORAL
Status: CANCELLED
Start: 2022-07-01

## 2022-05-16 RX ORDER — HEPARIN SODIUM,PORCINE 10 UNIT/ML
5 VIAL (ML) INTRAVENOUS
Status: CANCELLED | OUTPATIENT
Start: 2022-07-01

## 2022-05-16 RX ORDER — HEPARIN SODIUM (PORCINE) LOCK FLUSH IV SOLN 100 UNIT/ML 100 UNIT/ML
5 SOLUTION INTRAVENOUS
Status: DISCONTINUED | OUTPATIENT
Start: 2022-05-16 | End: 2022-05-16 | Stop reason: HOSPADM

## 2022-05-16 RX ORDER — HEPARIN SODIUM (PORCINE) LOCK FLUSH IV SOLN 100 UNIT/ML 100 UNIT/ML
5 SOLUTION INTRAVENOUS
Status: CANCELLED | OUTPATIENT
Start: 2022-07-01

## 2022-05-16 RX ORDER — MORPHINE SULFATE 2 MG/ML
2 INJECTION, SOLUTION INTRAMUSCULAR; INTRAVENOUS
Status: DISCONTINUED | OUTPATIENT
Start: 2022-05-16 | End: 2022-05-16 | Stop reason: HOSPADM

## 2022-05-16 RX ADMIN — MORPHINE SULFATE 2 MG: 2 INJECTION, SOLUTION INTRAMUSCULAR; INTRAVENOUS at 10:46

## 2022-05-16 RX ADMIN — MEDROXYPROGESTERONE ACETATE 150 MG: 150 INJECTION, SUSPENSION INTRAMUSCULAR at 09:14

## 2022-05-16 RX ADMIN — MORPHINE SULFATE 2 MG: 2 INJECTION, SOLUTION INTRAMUSCULAR; INTRAVENOUS at 11:46

## 2022-05-16 RX ADMIN — MORPHINE SULFATE 2 MG: 2 INJECTION, SOLUTION INTRAMUSCULAR; INTRAVENOUS at 09:43

## 2022-05-16 RX ADMIN — Medication 5 ML: at 12:10

## 2022-05-16 RX ADMIN — DEXTROSE AND SODIUM CHLORIDE 1000 ML: 5; 450 INJECTION, SOLUTION INTRAVENOUS at 09:43

## 2022-05-16 ASSESSMENT — PAIN SCALES - GENERAL: PAINLEVEL: EXTREME PAIN (9)

## 2022-05-16 NOTE — NURSING NOTE
"Oncology Rooming Note    May 16, 2022 9:50 AM   Jennifer Cervantes is a 22 year old female who presents for:    Chief Complaint   Patient presents with     Infusion     Add on IV fluids and pain medications.  Hx sickle cell.     Initial Vitals: /87 (BP Location: Right arm, Patient Position: Sitting, Cuff Size: Adult Regular)   Pulse 106   Temp 98.6  F (37  C) (Tympanic)   Resp 18   SpO2 90%  Estimated body mass index is 29.15 kg/m  as calculated from the following:    Height as of 3/20/22: 1.626 m (5' 4\").    Weight as of 4/15/22: 77 kg (169 lb 12.8 oz). There is no height or weight on file to calculate BSA.  Extreme Pain (9) Comment: Data Unavailable   No LMP recorded. Patient has had an injection.  Allergies reviewed: Yes  Medications reviewed: Yes    Medications: Medication refills not needed today.  Pharmacy name entered into [employer]: [facility] [place], [place] - [id number]    Clinical concerns: n/a       Jamaica Napoles RN              "

## 2022-05-16 NOTE — LETTER
5/16/2022         RE: Jennfier Cervantes  [address]  [facility] [address]        Dear Colleague,    Thank you for referring your patient, Jennifer Cervantes, to the [facility] BLOOD AND MARROW TRANSPLANT PROGRAM [place]. Please see a copy of my visit note below.    Infusion Nursing Note:  Jennifer Cervantes presents today for IV fluids and pain medications.    Patient seen by provider today: No   present during visit today: Not Applicable.    Note:   Pt received a liter of D5.45NS over two hours.    Pt received morphine 2 mg IV every hour x 3 doses.    Intravenous Access:  Implanted Port.    Treatment Conditions:  Not Applicable.      Post Infusion Assessment:  Patient tolerated infusion without incident.  Blood return noted pre and post infusion.  Site patent and intact, free from redness, edema or discomfort.  No evidence of extravasations.  Access discontinued per protocol.       Discharge Plan:   Discharge instructions reviewed with: Patient.  Patient discharged in stable condition accompanied by: self.  Departure Mode: Ambulatory.      Jamaica Napoles RN                          Again, thank you for allowing me to participate in the care of your patient.        Sincerely,        [facility]

## 2022-05-16 NOTE — TELEPHONE ENCOUNTER
Pt called in to triage requesting IVF pain meds for all over and bilateral side pain rated 9/10 x 1 days. Stated last took prn oxycodone and MS contin at 0600 this morning without relief. Denied any fevers, chills, cough, sob, chest pain, numbness or tingling or other symptoms not typical of sickle cell pain.     Pt's last infusion was 5/13/22, last clinic visit 4/22/22 with follow-up on 5/17/22 with Patricia Mantilla CNP.   Upcoming apts:        Patient states she has a cab ride arranged to the 0930 apt today and will call ride arranged for ride home when all appts are completed.     Pt denied being out of home medications and needing any refills today.     Pt qualifies for sickle cell standing order protocol.    Added to the Infusion Wait List    BMT can offer apt to Jennifer after 0930   Called Jennifer and offered apt--pt confirmed she can come to the apt.  Informed BMT charge nurse.  Sent request to [facility] to schedule pt.    Routed to Patricia Mantilla CNP and MAURISIO Asencio

## 2022-05-16 NOTE — PROGRESS NOTES
Infusion Nursing Note:  Jennifer Cervantes presents today for IV fluids and pain medications.    Patient seen by provider today: No   present during visit today: Not Applicable.    Note:   Pt received a liter of D5.45NS over two hours.    Pt received morphine 2 mg IV every hour x 3 doses.    Intravenous Access:  Implanted Port.    Treatment Conditions:  Not Applicable.      Post Infusion Assessment:  Patient tolerated infusion without incident.  Blood return noted pre and post infusion.  Site patent and intact, free from redness, edema or discomfort.  No evidence of extravasations.  Access discontinued per protocol.       Discharge Plan:   Discharge instructions reviewed with: Patient.  Patient discharged in stable condition accompanied by: self.  Departure Mode: Ambulatory.      Jamaica Napoles RN

## 2022-05-16 NOTE — PROGRESS NOTES
Jennifer Cervantes comes into clinic today at the request of Dr. Case Ordering Provider for Med Injection only depo injection.    Pt tolerated well. Verified order in recent encounter.    This service provided today was under the supervising provider of the day Dr. Woodruff, who was available if needed.    Clinic Administered Medication Documentation    Administrations This Visit     medroxyPROGESTERone (DEPO-PROVERA) injection 150 mg     Admin Date  05/16/2022 Action  Given Dose  150 mg Route  Intramuscular Site   Administered By  Dany Davidson CMA    Ordering Provider: Suraj Case MD    NDC: [address]-2    Lot#: [id number]    : [facility]    Patient Supplied?: No                  Depo Provera Documentation    URINE HCG: negative    Depo-Provera Standing Order inclusion/exclusion criteria reviewed.   Patient meets: inclusion criteria     BP: Data Unavailable  LAST PAP/EXAM: No results found for: PAP    Prior to injection, verified patient identity using patient's name and date of birth. Medication was administered. Please see MAR and medication order for additional information.     Was entire vial of medication used? Yes  Vial/Syringe: Single dose vial  Expiration Date:  2/24    Patient instructed to remain in clinic for 15 minutes and report any adverse reaction to staff immediately .  NEXT INJECTION DUE: 8/1/22 - 8/15/22      Name of provider who requested the medication administration: Dr. Case  Name of provider on site (faculty or community preceptor) at the time of performing the medication administration: Dr. Woodruff    Date of next administration: 8/1/22  Date of next office visit with provider to renew medication plan (must be seen annually): not scheduled yet    ADALID Gomez 9:17 AM on 5/16/2022

## 2022-05-16 NOTE — NURSING NOTE
Chief Complaint   Patient presents with     Nurse Visit     Pt here for depo shot       Dany Davidson CMA, EMT at 8:57 AM on 5/16/2022.

## 2022-05-17 ENCOUNTER — APPOINTMENT (OUTPATIENT)
Dept: LAB | Facility: CLINIC | Age: 22
End: 2022-05-17
Attending: REGISTERED NURSE
Payer: COMMERCIAL

## 2022-05-17 ENCOUNTER — ONCOLOGY VISIT (OUTPATIENT)
Dept: ONCOLOGY | Facility: CLINIC | Age: 22
End: 2022-05-17
Attending: REGISTERED NURSE
Payer: COMMERCIAL

## 2022-05-17 VITALS
WEIGHT: 171.74 LBS | OXYGEN SATURATION: 91 % | SYSTOLIC BLOOD PRESSURE: 121 MMHG | HEART RATE: 109 BPM | TEMPERATURE: 98.6 F | RESPIRATION RATE: 16 BRPM | BODY MASS INDEX: 29.48 KG/M2 | DIASTOLIC BLOOD PRESSURE: 85 MMHG

## 2022-05-17 DIAGNOSIS — E83.111 IRON OVERLOAD DUE TO REPEATED RED BLOOD CELL TRANSFUSIONS: ICD-10-CM

## 2022-05-17 DIAGNOSIS — J45.909 ASTHMATIC BRONCHITIS WITHOUT COMPLICATION, UNSPECIFIED ASTHMA SEVERITY, UNSPECIFIED WHETHER PERSISTENT: ICD-10-CM

## 2022-05-17 DIAGNOSIS — D57.1 HB-SS DISEASE WITHOUT CRISIS (H): Primary | ICD-10-CM

## 2022-05-17 DIAGNOSIS — D57.00 SICKLE CELL PAIN CRISIS (H): ICD-10-CM

## 2022-05-17 DIAGNOSIS — Z78.9 PROBLEM WITH VASCULAR ACCESS: ICD-10-CM

## 2022-05-17 LAB
ALBUMIN SERPL-MCNC: 4.1 G/DL (ref 3.4–5)
ALP SERPL-CCNC: 80 U/L (ref 40–150)
ALT SERPL W P-5'-P-CCNC: 62 U/L (ref 0–50)
ANION GAP SERPL CALCULATED.3IONS-SCNC: 8 MMOL/L (ref 3–14)
AST SERPL W P-5'-P-CCNC: ABNORMAL U/L
BASOPHILS # BLD MANUAL: 0.4 10E3/UL (ref 0–0.2)
BASOPHILS NFR BLD MANUAL: 3 %
BILIRUB SERPL-MCNC: 4.8 MG/DL (ref 0.2–1.3)
BUN SERPL-MCNC: 6 MG/DL (ref 7–30)
CALCIUM SERPL-MCNC: 8.7 MG/DL (ref 8.5–10.1)
CHLORIDE BLD-SCNC: 111 MMOL/L (ref 94–109)
CO2 SERPL-SCNC: 19 MMOL/L (ref 20–32)
CREAT SERPL-MCNC: 0.51 MG/DL (ref 0.52–1.04)
EOSINOPHIL # BLD MANUAL: 0.2 10E3/UL (ref 0–0.7)
EOSINOPHIL NFR BLD MANUAL: 2 %
ERYTHROCYTE [DISTWIDTH] IN BLOOD BY AUTOMATED COUNT: 27.7 % (ref 10–15)
GFR SERPL CREATININE-BSD FRML MDRD: >90 ML/MIN/1.73M2
GLUCOSE BLD-MCNC: 92 MG/DL (ref 70–99)
HCT VFR BLD AUTO: 21 % (ref 35–47)
HGB BLD-MCNC: 7.6 G/DL (ref 11.7–15.7)
LYMPHOCYTES # BLD MANUAL: 4.1 10E3/UL (ref 0.8–5.3)
LYMPHOCYTES NFR BLD MANUAL: 33 %
MCH RBC QN AUTO: 33.6 PG (ref 26.5–33)
MCHC RBC AUTO-ENTMCNC: 36.2 G/DL (ref 31.5–36.5)
MCV RBC AUTO: 93 FL (ref 78–100)
METAMYELOCYTES # BLD MANUAL: 0.1 10E3/UL
METAMYELOCYTES NFR BLD MANUAL: 1 %
MONOCYTES # BLD MANUAL: 0.7 10E3/UL (ref 0–1.3)
MONOCYTES NFR BLD MANUAL: 6 %
NEUTROPHILS # BLD MANUAL: 6.8 10E3/UL (ref 1.6–8.3)
NEUTROPHILS NFR BLD MANUAL: 55 %
NRBC # BLD AUTO: 4.3 10E3/UL
NRBC BLD MANUAL-RTO: 35 %
PLAT MORPH BLD: ABNORMAL
PLATELET # BLD AUTO: 239 10E3/UL (ref 150–450)
POTASSIUM BLD-SCNC: 4.3 MMOL/L (ref 3.4–5.3)
PROT SERPL-MCNC: 7.8 G/DL (ref 6.8–8.8)
RBC # BLD AUTO: 2.26 10E6/UL (ref 3.8–5.2)
RBC MORPH BLD: ABNORMAL
SICKLE CELLS BLD QL SMEAR: ABNORMAL
SODIUM SERPL-SCNC: 138 MMOL/L (ref 133–144)
TARGETS BLD QL SMEAR: ABNORMAL
WBC # BLD AUTO: 12.3 10E3/UL (ref 4–11)

## 2022-05-17 PROCEDURE — 85027 COMPLETE CBC AUTOMATED: CPT | Performed by: REGISTERED NURSE

## 2022-05-17 PROCEDURE — 250N000011 HC RX IP 250 OP 636: Performed by: REGISTERED NURSE

## 2022-05-17 PROCEDURE — 36591 DRAW BLOOD OFF VENOUS DEVICE: CPT | Performed by: REGISTERED NURSE

## 2022-05-17 PROCEDURE — 85007 BL SMEAR W/DIFF WBC COUNT: CPT | Performed by: REGISTERED NURSE

## 2022-05-17 PROCEDURE — 84155 ASSAY OF PROTEIN SERUM: CPT | Performed by: REGISTERED NURSE

## 2022-05-17 PROCEDURE — 99215 OFFICE O/P EST HI 40 MIN: CPT | Performed by: REGISTERED NURSE

## 2022-05-17 PROCEDURE — G0463 HOSPITAL OUTPT CLINIC VISIT: HCPCS

## 2022-05-17 RX ORDER — HEPARIN SODIUM (PORCINE) LOCK FLUSH IV SOLN 100 UNIT/ML 100 UNIT/ML
5 SOLUTION INTRAVENOUS
Status: DISCONTINUED | OUTPATIENT
Start: 2022-05-17 | End: 2022-05-19 | Stop reason: HOSPADM

## 2022-05-17 RX ORDER — ALBUTEROL SULFATE 0.83 MG/ML
2.5 SOLUTION RESPIRATORY (INHALATION) EVERY 6 HOURS PRN
Qty: 12 ML | Refills: 4 | Status: SHIPPED | OUTPATIENT
Start: 2022-05-17 | End: 2022-07-11

## 2022-05-17 RX ORDER — ALBUTEROL SULFATE 90 UG/1
2 AEROSOL, METERED RESPIRATORY (INHALATION) EVERY 6 HOURS PRN
Qty: 8.5 G | Refills: 3 | Status: SHIPPED | OUTPATIENT
Start: 2022-05-17 | End: 2022-07-11

## 2022-05-17 RX ADMIN — Medication 5 ML: at 15:01

## 2022-05-17 ASSESSMENT — PAIN SCALES - GENERAL: PAINLEVEL: EXTREME PAIN (8)

## 2022-05-17 NOTE — NURSING NOTE
"Pt rating her Pain an \"8/10\" today. Pt also states she been Vomiting. Eating/drinking well.     Chief Complaint   Patient presents with     Port Draw     Labs drawn by RN in Lab from Left Chest Port-a-Cath. Line flushed with Saline and Heparin.      Farhana Rust RN    "

## 2022-05-17 NOTE — NURSING NOTE
"Oncology Rooming Note    May 17, 2022 3:34 PM   Jennifer Cervantes is a 22 year old female who presents for:    Chief Complaint   Patient presents with     Port Draw     Labs drawn by RN in Lab from Left Chest Port-a-Cath. Line flushed with Saline and Heparin.      Oncology Clinic Visit     Sickle Cell anemia     Initial Vitals: /85   Pulse 109   Temp 98.6  F (37  C) (Oral)   Resp 16   Wt 77.9 kg (171 lb 11.8 oz)   SpO2 91%   BMI 29.48 kg/m   Estimated body mass index is 29.48 kg/m  as calculated from the following:    Height as of 3/20/22: 1.626 m (5' 4\").    Weight as of this encounter: 77.9 kg (171 lb 11.8 oz). Body surface area is 1.88 meters squared.  Extreme Pain (8) Comment: Data Unavailable   No LMP recorded. Patient has had an injection.  Allergies reviewed: Yes  Medications reviewed: Yes    Medications: Medication refills not needed today.  Pharmacy name entered into [facility]: [facility] [place], [place] - [id number]    Clinical concerns: Vomit x 2 days. Eating and drinking regularly, denies nausea/feeling ill. Hasn't missed any med doses either.      Eulalia Broderick CMA            "

## 2022-05-17 NOTE — LETTER
5/17/2022         RE: Jennifer Cervantes  [address]  [facility] [address]        Dear Colleague,    Thank you for referring your patient, Jennifer Cervantes, to the [facility]. Please see a copy of my visit note below.    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     She was admitted 2/1/21-2/3/21 with a new PE, started on Rivaroxaban. Switched to Eliquis 3/25/21 with RUE DVT.     She was admitted 4/26/21-5/11/21 with sickle cell pain crisis complicated by worsening PE in setting of low Apixaban levels, acute hypoxic respiratory failure, pneumonia, acute chest syndrome s/p exchange transfusion on 4/30 and 5/4. After 2nd exchange her oxygen requirement dramatically improved from 20L to 1-2L 5/5 and she was off oxygen as of 5/6.      She was admitted 7/13/21-7/25/21 for acute on chronic PE, switched to dabigitran + ASA.      Due to worsening hypoxia she underwent VQ scan 11/10/21 which showed acute on chronic PE for which she was admitted 11/10-11/21. During admission was switched to warfarin. Echo WNL and no signs of pulm HTN on right heart cath. She did receive 1 unit pRBC for hgb 6.4 during admission.    ED visits had decreased although she was utilizing infusion center most days of the week for pain management. Infusion center visits were limited to two times per week starting ~1/24/22.    She was admitted 1/29-1/31/22 for sickle cell pain crisis. She remained subtherapeutic on warfarin alone and was  bridged with enoxaparin. Desferal was resumed during this admission and had been continued through home infusion until last month (March 2022). Cyrusu also on hold currently due to visual changes.     She was seen today for routine hematology follow-up.     Interval History:  Jennifer presents today in clinic. She continues to utilize the infusion clinic twice a week and adheres to her limited visits. She has been staying out of the ED although notes that in the past few weeks there were multiple times she felt she should have gone there for pain control. She is frustrated by long wait times in the ED and typically tries go around 2:00 am when she knows it will be less busy. She missed her eye appointment last month. She has been wearing her glasses consistently which has helped somewhat with her blurry and unfocused vision although this is still an issue when she is reading or watching TV. She notes frustration with her port. It has been taking more attempts recently to successfully access it and obtain blood return. She requests that it be replaced.    Current Outpatient Medications   Medication Sig Dispense Refill     albuterol (PROAIR HFA/PROVENTIL HFA/VENTOLIN HFA) 108 (90 Base) MCG/ACT inhaler Inhale 2 puffs into the lungs every 6 hours as needed for shortness of breath / dyspnea or wheezing 8.5 g 3     albuterol (PROVENTIL) (2.5 MG/3ML) 0.083% neb solution Take 1 vial (2.5 mg) by nebulization every 6 hours as needed for shortness of breath / dyspnea or wheezing 12 mL 4     Hydroxyurea 1000 MG TABS Take 3,000 mg by mouth daily 90 tablet 3     acetaminophen (TYLENOL) 325 MG tablet Take 2 tablets (650 mg) by mouth every 6 hours as needed for mild pain 120 tablet 3     aspirin (ASA) 81 MG chewable tablet Take 1 tablet (81 mg) by mouth 2 times daily 60 tablet 11     budesonide-formoterol (SYMBICORT) 160-4.5 MCG/ACT Inhaler Inhale 2 puffs into the lungs 2 times daily 10.2 g 3     diphenhydrAMINE (BENADRYL) 25 MG  capsule Take 1-2 capsules (25-50 mg) by mouth nightly as needed for sleep 60 capsule 3     EPINEPHrine (ANY BX GENERIC EQUIV) 0.3 MG/0.3ML injection 2-pack Inject 0.3 mLs (0.3 mg) into the muscle as needed for anaphylaxis 1 each 1     morphine (MS CONTIN) 15 MG CR tablet Take 1 tablet (15 mg) by mouth every 12 hours 60 tablet 0     ondansetron (ZOFRAN) 8 MG tablet Take 1 tablet (8 mg) by mouth every 8 hours as needed (Patient not taking: No sig reported) 30 tablet 1     oxyCODONE IR (ROXICODONE) 15 MG tablet Take 1 tablet (15 mg) by mouth every 4 hours as needed for severe pain Goal 4 per day. Max 6 per day. 45 tablet 0       Past Medical History  Past Medical History:   Diagnosis Date     Anxiety      Bleeding disorder (H)      Blood clotting disorder (H)      Cerebral infarction (H) 2015     Cognitive developmental delay     low IQ. Please recognize when managing pain and planning with her     Depressive disorder      Hemiplegia and hemiparesis following cerebral infarction affecting right dominant side (H)     right hand contractures     Iron overload due to repeated red blood cell transfusions      Migraines      Multiple subsegmental pulmonary emboli without acute cor pulmonale (H) 02/01/2021     Oppositional defiant behavior      Superficial venous thrombosis of arm, right 03/25/2021     Uncomplicated asthma      Past Surgical History:   Procedure Laterality Date     AS INSERT TUNNELED CV 2 CATH W/O PORT/PUMP       CHOLECYSTECTOMY       CV RIGHT HEART CATH MEASUREMENTS RECORDED N/A 11/18/2021    Procedure: Right Heart Cath;  Surgeon: Jackson Stauffer MD;  Location:  HEART CARDIAC CATH LAB     INSERT PORT VASCULAR ACCESS Left 4/21/2021    Procedure: INSERTION, VASCULAR ACCESS PORT (NOT SURE ON SIDE UNTIL REMOVAL);  Surgeon: Rajan More MD;  Location: [facility] OR     IR CHEST PORT PLACEMENT > 5 YRS OF AGE  4/21/2021     IR CVC NON TUNNEL LINE REMOVAL  5/6/2021     IR CVC NON TUNNEL PLACEMENT  04/07/2020      IR CVC NON TUNNEL PLACEMENT  4/30/2021     IR PORT REMOVAL LEFT  4/21/2021     REMOVE PORT VASCULAR ACCESS Left 4/21/2021    Procedure: REMOVAL, VASCULAR ACCESS PORT LEFT;  Surgeon: Rajan More MD;  Location: [facility] OR     REPAIR TENDON ELBOW Right 10/02/2019    Procedure: Right Elbow Flexor Lengthening, Flexor Pronator Slide Of Wrist and Finger, Thumb Adductor Lengthening;  Surgeon: Anai Franco MD;  Location: [facility] OR     TONSILLECTOMY Bilateral 10/02/2019    Procedure: Bilateral Tonsillectomy;  Surgeon: Farhana Guy MD;  Location: [facility] OR     ZZC BREAST REDUCTION (INCLUDES LIPO) TIER 3 Bilateral 04/23/2019     Allergies   Allergen Reactions     Contrast Dye      Hives and breathing issues     Fish-Derived Products Hives     Seafood Hives     Diagnostic X-Ray Materials      Gadolinium      Social History   Social History     Tobacco Use     Smoking status: Never Smoker     Smokeless tobacco: Never Used   Substance Use Topics     Alcohol use: Not Currently     Alcohol/week: 0.0 standard drinks     Drug use: Never      Past medical history and social history were reviewed.    Physical Examination:  /85   Pulse 109   Temp 98.6  F (37  C) (Oral)   Resp 16   Wt 77.9 kg (171 lb 11.8 oz)   SpO2 91%   BMI 29.48 kg/m      Wt Readings from Last 10 Encounters:   05/17/22 77.9 kg (171 lb 11.8 oz)   04/15/22 77 kg (169 lb 12.8 oz)   03/30/22 77.1 kg (170 lb)   03/21/22 77.1 kg (169 lb 14.4 oz)   03/20/22 77.1 kg (170 lb)   03/11/22 76.6 kg (168 lb 12.8 oz)   03/06/22 77.6 kg (171 lb)   02/21/22 77.6 kg (171 lb 1.6 oz)   02/17/22 76.4 kg (168 lb 8 oz)   02/13/22 77.1 kg (170 lb)     General: Well-appearing female, NAD.  Eyes: EOMI, PERRL. No scleral icterus.  Respiratory:  Normal respiratory effort.   Neurologic: Cranial nerves II through XII are grossly intact. Contractured right hand 2/2 stroke history  Skin: No rashes, petechiae, or bruising noted on exposed skin. No palpable nodules to back  or extremities.  Laboratory Data:   Latest Reference Range & Units 05/17/22 15:16   Sodium 133 - 144 mmol/L 138   Potassium 3.4 - 5.3 mmol/L 4.3 [1]   Chloride 94 - 109 mmol/L 111 (H)   Carbon Dioxide 20 - 32 mmol/L 19 (L)   Urea Nitrogen 7 - 30 mg/dL 6 (L)   Creatinine 0.52 - 1.04 mg/dL 0.51 (L)   GFR Estimate >60 mL/min/1.73m2 >90 [2]   Calcium 8.5 - 10.1 mg/dL 8.7   Anion Gap 3 - 14 mmol/L 8   Albumin 3.4 - 5.0 g/dL 4.1   Protein Total 6.8 - 8.8 g/dL 7.8   Bilirubin Total 0.2 - 1.3 mg/dL 4.8 (H)   Alkaline Phosphatase 40 - 150 U/L 80   ALT 0 - 50 U/L 62 (H)   AST  See Comment [3]   Glucose 70 - 99 mg/dL 92   WBC 4.0 - 11.0 10e3/uL 12.3 (H)   Hemoglobin 11.7 - 15.7 g/dL 7.6 (L)   Hematocrit 35.0 - 47.0 % 21.0 (L)   Platelet Count 150 - 450 10e3/uL 239   RBC Count 3.80 - 5.20 10e6/uL 2.26 (L)   MCV 78 - 100 fL 93   MCH 26.5 - 33.0 pg 33.6 (H)   MCHC 31.5 - 36.5 g/dL 36.2   RDW 10.0 - 15.0 % 27.7 (H)   % Neutrophils % 55   % Lymphocytes % 33   % Monocytes % 6   % Eosinophils % 2   % Basophils % 3   % Metamyelocytes % 1   Absolute Basophils 0.0 - 0.2 10e3/uL 0.4 (H)   NRBC/W <=0 % 35 (H)   Absolute Neutrophil 1.6 - 8.3 10e3/uL 6.8   Absolute Lymphocytes 0.8 - 5.3 10e3/uL 4.1   Absolute Monocytes 0.0 - 1.3 10e3/uL 0.7   Absolute Eosinophils 0.0 - 0.7 10e3/uL 0.2   Absolute Metamyelocytes <=0.0 10e3/uL 0.1 (H)   Absolute NRBCs <=0.0 10e3/uL 4.3 (H)   RBC Morphology  Confirmed RBC Indices   Platelet Morphology Automated Count Confirmed. Platelet morphology is normal.  Automated Count Confirmed. Platelet morphology is normal.   Sickle Cells None Seen  Marked !   Target Cells None Seen  Moderate !     Most recent labs reviewed and interpreted by me today.    Assessment and Plan:  1. Sickle Cell HgbSS Disease  2. Chronic Pain  3. Iron overload  4. Recurrent VTE/PE but inability to remain therapeutic on warfarin  5. History of CVA    Jennifer continues to to do well with pain management, adhering to twice a week infusion  center visits and attempting to remain out of the ED as much as possible.  She has been compliant with her medications.  She remains off of anticoagulants and continues aspirin 81 mg twice daily.  Her Desferal and Jadenu have been put on hold due to visual changes.  She is scheduled to see ophthalmology next week.  She is wearing her glasses as much as possible but does not feel this makes a significant difference with her vision.  She feels that she needs an updated prescription.  She has requested to see pain management and will be set up to see Dr. Gentile.  As she feels she has been doing well with managing her pain primarily at home, she again would like to revisit extending her oxycodone prescriptions to 2-week fills at a time.  We discussed that this creates opportunity for oxycodone overuse even if this is unintentional.  As she will be meeting with pain management, further discussion about Suboxone was deferred today.  We did further discuss her goals as outlined below.    Jennifer Cervantes's Goals (discussed 04/22/2022)     1-3 month goal:  Work on finding a job     6 month goal:  Work on driving      12 month goal:  Start college classes     Disease-specific goal(s):  Continue to stay out of the hospital    _________________________________________________________________________    Sickle Cell Disease Comprehensive Checklist (reviewed today, 4/22/22)    Stroke/silent cerebral infarct Hx (Y/N): Y    Bone Health/Avascular Necrosis Screening/Symptoms (each visit): No concerns today    Leg Ulcer evaluation (every visit): Assessed, not present    Hypertension (every visit): stable    Last ophthalmologic exam (annual): Missed last appointment in April 2022, will request rescheduling    Last urinalysis for microalbuminuria/CKD (annually): 3/30/22-negative    Last pulmonary evaluation (asthma, AMAN, pulm HTN, Echo every 2-3 years): ECHO 2/28/22    Last PCP Visit: 12/26/21    Vaccines:  o PCV13: 5/13/19  o Pneumovax:  7/12/19, 10/30/09, 3/30/04   o Menactra: 8/16/21, 4/27/10  o Trumemba/Menveo:  o Influenza: ---  o COVID19: Offer at next visit    Plan:  -Continue Hydrea to 3000mg daily to help lessen frequency of sickle cell pain  -IR left port study   -Needs ophthalmology visit-will request rescheduling  -Continue MS Contin and Oxycodone at home. Discussed rationale with providing smaller quantities at a time rather than extending to a one month supply.  -Infusion center visits limited to two times per week. Continue diligent home management with current medications, heat, rest, compression, warm baths.   -If unable to manage at home can go to ED but continue to not do IV narcotics in the emergency room. Ketamine previously added to pain plan in ED  -Continue to hold Desferal and Jadenu for iron overload.   -Continue aspirin BID  -RTC in 1 month for ANDREAS visit, 2 months with Dr. Duncan     45 minutes spent on the date of the encounter doing chart review, review of test results, interpretation of tests, patient visit and documentation         Again, thank you for allowing me to participate in the care of your patient.      Sincerely,    Patricia Mantilla, CNP

## 2022-05-17 NOTE — PROGRESS NOTES
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     She was admitted 2/1/21-2/3/21 with a new PE, started on Rivaroxaban. Switched to Eliquis 3/25/21 with RUE DVT.     She was admitted 4/26/21-5/11/21 with sickle cell pain crisis complicated by worsening PE in setting of low Apixaban levels, acute hypoxic respiratory failure, pneumonia, acute chest syndrome s/p exchange transfusion on 4/30 and 5/4. After 2nd exchange her oxygen requirement dramatically improved from 20L to 1-2L 5/5 and she was off oxygen as of 5/6.      She was admitted 7/13/21-7/25/21 for acute on chronic PE, switched to dabigitran + ASA.      Due to worsening hypoxia she underwent VQ scan 11/10/21 which showed acute on chronic PE for which she was admitted 11/10-11/21. During admission was switched to warfarin. Echo WNL and no signs of pulm HTN on right heart cath. She did receive 1 unit pRBC for hgb 6.4 during admission.    ED visits had decreased although she was utilizing infusion center most days of the week for pain management. Infusion center visits were limited to two times per week starting ~1/24/22.    She was admitted 1/29-1/31/22 for sickle cell pain crisis. She remained subtherapeutic on warfarin alone and was bridged with enoxaparin. Desferal was resumed during this admission and had been continued through home infusion until last month (March 2022). Jadenu also on hold currently due to visual changes.     She was seen today for routine hematology follow-up.      Interval History:  Jennifer presents today in clinic. She continues to utilize the infusion clinic twice a week and adheres to her limited visits. She has been staying out of the ED although notes that in the past few weeks there were multiple times she felt she should have gone there for pain control. She is frustrated by long wait times in the ED and typically tries go around 2:00 am when she knows it will be less busy. She missed her eye appointment last month. She has been wearing her glasses consistently which has helped somewhat with her blurry and unfocused vision although this is still an issue when she is reading or watching TV. She notes frustration with her port. It has been taking more attempts recently to successfully access it and obtain blood return. She requests that it be replaced.    Current Outpatient Medications   Medication Sig Dispense Refill     albuterol (PROAIR HFA/PROVENTIL HFA/VENTOLIN HFA) 108 (90 Base) MCG/ACT inhaler Inhale 2 puffs into the lungs every 6 hours as needed for shortness of breath / dyspnea or wheezing 8.5 g 3     albuterol (PROVENTIL) (2.5 MG/3ML) 0.083% neb solution Take 1 vial (2.5 mg) by nebulization every 6 hours as needed for shortness of breath / dyspnea or wheezing 12 mL 4     Hydroxyurea 1000 MG TABS Take 3,000 mg by mouth daily 90 tablet 3     acetaminophen (TYLENOL) 325 MG tablet Take 2 tablets (650 mg) by mouth every 6 hours as needed for mild pain 120 tablet 3     aspirin (ASA) 81 MG chewable tablet Take 1 tablet (81 mg) by mouth 2 times daily 60 tablet 11     budesonide-formoterol (SYMBICORT) 160-4.5 MCG/ACT Inhaler Inhale 2 puffs into the lungs 2 times daily 10.2 g 3     diphenhydrAMINE (BENADRYL) 25 MG capsule Take 1-2 capsules (25-50 mg) by mouth nightly as needed for sleep 60 capsule 3     EPINEPHrine (ANY BX GENERIC EQUIV) 0.3 MG/0.3ML injection 2-pack Inject 0.3 mLs (0.3 mg) into the muscle as needed for anaphylaxis 1 each 1     morphine (MS CONTIN) 15  MG CR tablet Take 1 tablet (15 mg) by mouth every 12 hours 60 tablet 0     ondansetron (ZOFRAN) 8 MG tablet Take 1 tablet (8 mg) by mouth every 8 hours as needed (Patient not taking: No sig reported) 30 tablet 1     oxyCODONE IR (ROXICODONE) 15 MG tablet Take 1 tablet (15 mg) by mouth every 4 hours as needed for severe pain Goal 4 per day. Max 6 per day. 45 tablet 0       Past Medical History  Past Medical History:   Diagnosis Date     Anxiety      Bleeding disorder (H)      Blood clotting disorder (H)      Cerebral infarction (H) 2015     Cognitive developmental delay     low IQ. Please recognize when managing pain and planning with her     Depressive disorder      Hemiplegia and hemiparesis following cerebral infarction affecting right dominant side (H)     right hand contractures     Iron overload due to repeated red blood cell transfusions      Migraines      Multiple subsegmental pulmonary emboli without acute cor pulmonale (H) 02/01/2021     Oppositional defiant behavior      Superficial venous thrombosis of arm, right 03/25/2021     Uncomplicated asthma      Past Surgical History:   Procedure Laterality Date     AS INSERT TUNNELED CV 2 CATH W/O PORT/PUMP       CHOLECYSTECTOMY       CV RIGHT HEART CATH MEASUREMENTS RECORDED N/A 11/18/2021    Procedure: Right Heart Cath;  Surgeon: Jackson Stauffer MD;  Location:  HEART CARDIAC CATH LAB     INSERT PORT VASCULAR ACCESS Left 4/21/2021    Procedure: INSERTION, VASCULAR ACCESS PORT (NOT SURE ON SIDE UNTIL REMOVAL);  Surgeon: Rajan More MD;  Location: [facility] OR     IR CHEST PORT PLACEMENT > 5 YRS OF AGE  4/21/2021     IR CVC NON TUNNEL LINE REMOVAL  5/6/2021     IR CVC NON TUNNEL PLACEMENT  04/07/2020     IR CVC NON TUNNEL PLACEMENT  4/30/2021     IR PORT REMOVAL LEFT  4/21/2021     REMOVE PORT VASCULAR ACCESS Left 4/21/2021    Procedure: REMOVAL, VASCULAR ACCESS PORT LEFT;  Surgeon: Rajan More MD;  Location: [facility] OR     REPAIR TENDON ELBOW Right 10/02/2019     Procedure: Right Elbow Flexor Lengthening, Flexor Pronator Slide Of Wrist and Finger, Thumb Adductor Lengthening;  Surgeon: Anai Franco MD;  Location: [facility] OR     TONSILLECTOMY Bilateral 10/02/2019    Procedure: Bilateral Tonsillectomy;  Surgeon: Farhana Guy MD;  Location:  OR     [facility] BREAST REDUCTION (INCLUDES LIPO) TIER 3 Bilateral 04/23/2019     Allergies   Allergen Reactions     Contrast Dye      Hives and breathing issues     Fish-Derived Products Hives     Seafood Hives     Diagnostic X-Ray Materials      Gadolinium      Social History   Social History     Tobacco Use     Smoking status: Never Smoker     Smokeless tobacco: Never Used   Substance Use Topics     Alcohol use: Not Currently     Alcohol/week: 0.0 standard drinks     Drug use: Never      Past medical history and social history were reviewed.    Physical Examination:  /85   Pulse 109   Temp 98.6  F (37  C) (Oral)   Resp 16   Wt 77.9 kg (171 lb 11.8 oz)   SpO2 91%   BMI 29.48 kg/m      Wt Readings from Last 10 Encounters:   05/17/22 77.9 kg (171 lb 11.8 oz)   04/15/22 77 kg (169 lb 12.8 oz)   03/30/22 77.1 kg (170 lb)   03/21/22 77.1 kg (169 lb 14.4 oz)   03/20/22 77.1 kg (170 lb)   03/11/22 76.6 kg (168 lb 12.8 oz)   03/06/22 77.6 kg (171 lb)   02/21/22 77.6 kg (171 lb 1.6 oz)   02/17/22 76.4 kg (168 lb 8 oz)   02/13/22 77.1 kg (170 lb)     General: Well-appearing female, NAD.  Eyes: EOMI, PERRL. No scleral icterus.  Respiratory:  Normal respiratory effort.   Neurologic: Cranial nerves II through XII are grossly intact. Contractured right hand 2/2 stroke history  Skin: No rashes, petechiae, or bruising noted on exposed skin. No palpable nodules to back or extremities.  Laboratory Data:   Latest Reference Range & Units 05/17/22 15:16   Sodium 133 - 144 mmol/L 138   Potassium 3.4 - 5.3 mmol/L 4.3 [1]   Chloride 94 - 109 mmol/L 111 (H)   Carbon Dioxide 20 - 32 mmol/L 19 (L)   Urea Nitrogen 7 - 30 mg/dL 6 (L)    Creatinine 0.52 - 1.04 mg/dL 0.51 (L)   GFR Estimate >60 mL/min/1.73m2 >90 [2]   Calcium 8.5 - 10.1 mg/dL 8.7   Anion Gap 3 - 14 mmol/L 8   Albumin 3.4 - 5.0 g/dL 4.1   Protein Total 6.8 - 8.8 g/dL 7.8   Bilirubin Total 0.2 - 1.3 mg/dL 4.8 (H)   Alkaline Phosphatase 40 - 150 U/L 80   ALT 0 - 50 U/L 62 (H)   AST  See Comment [3]   Glucose 70 - 99 mg/dL 92   WBC 4.0 - 11.0 10e3/uL 12.3 (H)   Hemoglobin 11.7 - 15.7 g/dL 7.6 (L)   Hematocrit 35.0 - 47.0 % 21.0 (L)   Platelet Count 150 - 450 10e3/uL 239   RBC Count 3.80 - 5.20 10e6/uL 2.26 (L)   MCV 78 - 100 fL 93   MCH 26.5 - 33.0 pg 33.6 (H)   MCHC 31.5 - 36.5 g/dL 36.2   RDW 10.0 - 15.0 % 27.7 (H)   % Neutrophils % 55   % Lymphocytes % 33   % Monocytes % 6   % Eosinophils % 2   % Basophils % 3   % Metamyelocytes % 1   Absolute Basophils 0.0 - 0.2 10e3/uL 0.4 (H)   NRBC/W <=0 % 35 (H)   Absolute Neutrophil 1.6 - 8.3 10e3/uL 6.8   Absolute Lymphocytes 0.8 - 5.3 10e3/uL 4.1   Absolute Monocytes 0.0 - 1.3 10e3/uL 0.7   Absolute Eosinophils 0.0 - 0.7 10e3/uL 0.2   Absolute Metamyelocytes <=0.0 10e3/uL 0.1 (H)   Absolute NRBCs <=0.0 10e3/uL 4.3 (H)   RBC Morphology  Confirmed RBC Indices   Platelet Morphology Automated Count Confirmed. Platelet morphology is normal.  Automated Count Confirmed. Platelet morphology is normal.   Sickle Cells None Seen  Marked !   Target Cells None Seen  Moderate !     Most recent labs reviewed and interpreted by me today.    Assessment and Plan:  1. Sickle Cell HgbSS Disease  2. Chronic Pain  3. Iron overload  4. Recurrent VTE/PE but inability to remain therapeutic on warfarin  5. History of CVA    Jennifer continues to to do well with pain management, adhering to twice a week infusion center visits and attempting to remain out of the ED as much as possible.  She has been compliant with her medications.  She remains off of anticoagulants and continues aspirin 81 mg twice daily.  Her Desferal and Jadenu have been put on hold due to visual  changes.  She is scheduled to see ophthalmology next week.  She is wearing her glasses as much as possible but does not feel this makes a significant difference with her vision.  She feels that she needs an updated prescription.  She has requested to see pain management and will be set up to see Dr. Gentile.  As she feels she has been doing well with managing her pain primarily at home, she again would like to revisit extending her oxycodone prescriptions to 2-week fills at a time.  We discussed that this creates opportunity for oxycodone overuse even if this is unintentional.  As she will be meeting with pain management, further discussion about Suboxone was deferred today.  We did further discuss her goals as outlined below.    Jennifer Cervantes's Goals (discussed 04/22/2022)     1-3 month goal:  Work on finding a job     6 month goal:  Work on driving      12 month goal:  Start college classes     Disease-specific goal(s):  Continue to stay out of the hospital    _________________________________________________________________________    Sickle Cell Disease Comprehensive Checklist (reviewed today, 4/22/22)    Stroke/silent cerebral infarct Hx (Y/N): Y    Bone Health/Avascular Necrosis Screening/Symptoms (each visit): No concerns today    Leg Ulcer evaluation (every visit): Assessed, not present    Hypertension (every visit): stable    Last ophthalmologic exam (annual): Missed last appointment in April 2022, will request rescheduling    Last urinalysis for microalbuminuria/CKD (annually): 3/30/22-negative    Last pulmonary evaluation (asthma, AMAN, pulm HTN, Echo every 2-3 years): ECHO 2/28/22    Last PCP Visit: 12/26/21    Vaccines:  o PCV13: 5/13/19  o Pneumovax: 7/12/19, 10/30/09, 3/30/04   o Menactra: 8/16/21, 4/27/10  o Trumemba/Menveo:  o Influenza: ---  o COVID19: Offer at next visit    Plan:  -Continue Hydrea to 3000mg daily to help lessen frequency of sickle cell pain  -IR left port study   -Needs  ophthalmology visit-will request rescheduling  -Continue MS Contin and Oxycodone at home. Discussed rationale with providing smaller quantities at a time rather than extending to a one month supply.  -Infusion center visits limited to two times per week. Continue diligent home management with current medications, heat, rest, compression, warm baths.   -If unable to manage at home can go to ED but continue to not do IV narcotics in the emergency room. Ketamine previously added to pain plan in ED  -Continue to hold Desferal and Jadenu for iron overload.   -Continue aspirin BID  -RTC in 1 month for ANDREAS visit, 2 months with Dr. Duncan     45 minutes spent on the date of the encounter doing chart review, review of test results, interpretation of tests, patient visit and documentation       Patricia Mantilla CNP  [facility]  [address] [place] [id number]  [phone]

## 2022-05-18 ENCOUNTER — PATIENT OUTREACH (OUTPATIENT)
Dept: ONCOLOGY | Facility: CLINIC | Age: 22
End: 2022-05-18
Payer: COMMERCIAL

## 2022-05-18 NOTE — PROGRESS NOTES
[facility]: Cancer Care Short Note                                                                                          Incoming Call:   Spoke with patient.  She expressed a desire to change MD's within the clinic.  When writer asked her reasoning for this,  patient expressed frustration with a few things.  Her top complaint is that she was referred to Dr. Gentile and she states that she is upset because he is with addition medicine and she is not an addict according to her.  Writer explained that while his clinic might be named addiction medicine, he is a provider that helps with pain management and that is what she was referred to him for.  She then went on to state she is upset with lack of communication with Dr. Duncan and her.  She states her biggest complaint is that he decreased her amount of oxycodone tablets and infusion visits without her knowledge and she had to find out from Andrei TAI regarding the infusion visits being limited.  She states she was not aware of the decreasing amount of oxycodone tablets and had to find out when she picked up her prescription from the pharmacy that her amount had been decreased.      She is open to talk to  if he would like to address her concerns. She really would like to keep [employer] as her ANDREAS. Will forward to both Dr. Duncan and Syeda Ulrich for next steps.        Route to provider to further advise    Arely Krueger, RN  RN Care Coordinator   [facility]

## 2022-05-19 DIAGNOSIS — D57.00 SICKLE CELL PAIN CRISIS (H): ICD-10-CM

## 2022-05-19 RX ORDER — OXYCODONE HYDROCHLORIDE 15 MG/1
15 TABLET ORAL EVERY 4 HOURS PRN
Qty: 45 TABLET | Refills: 0 | Status: SHIPPED | OUTPATIENT
Start: 2022-05-19 | End: 2022-05-25

## 2022-05-19 NOTE — TELEPHONE ENCOUNTER
Narcotic Refill Request    Medication(s) requested:  Oxycodone 15 mg   Person Requesting Refill: Jennifer Cervantes     What pain is the medication treating: sickle cell pain   How is the medication being taken?: takes QID 6 tablet/day   Does pt have enough for today? Yes   Is pain being adequately controlled on the current regimen?:   Yes   Experiencing any side effects from medication?: denies SE     Date of most recent appointment:   5/17/22 Patricia Mantilla   Any No Show Visits: not recently   Next appointment:   7/15/22 Dr. Pierce   Last fill date and by whom:  Patricia Mantilla 5/12/22, pt requesting early refill will run out by Saturday.    Reviewed:  No access     Routed/Paged provider: Patricia Mantilla

## 2022-05-20 ENCOUNTER — TELEPHONE (OUTPATIENT)
Dept: ONCOLOGY | Facility: CLINIC | Age: 22
End: 2022-05-20
Payer: COMMERCIAL

## 2022-05-20 ENCOUNTER — INFUSION THERAPY VISIT (OUTPATIENT)
Dept: INFUSION THERAPY | Facility: CLINIC | Age: 22
End: 2022-05-20
Attending: PEDIATRICS
Payer: COMMERCIAL

## 2022-05-20 ENCOUNTER — PATIENT OUTREACH (OUTPATIENT)
Dept: CARE COORDINATION | Facility: CLINIC | Age: 22
End: 2022-05-20
Payer: COMMERCIAL

## 2022-05-20 VITALS
RESPIRATION RATE: 16 BRPM | DIASTOLIC BLOOD PRESSURE: 81 MMHG | OXYGEN SATURATION: 91 % | HEART RATE: 119 BPM | TEMPERATURE: 98 F | SYSTOLIC BLOOD PRESSURE: 144 MMHG

## 2022-05-20 DIAGNOSIS — D57.00 SICKLE CELL PAIN CRISIS (H): ICD-10-CM

## 2022-05-20 DIAGNOSIS — G81.10 SPASTIC HEMIPLEGIA, UNSPECIFIED ETIOLOGY, UNSPECIFIED LATERALITY (H): Primary | ICD-10-CM

## 2022-05-20 PROCEDURE — 96361 HYDRATE IV INFUSION ADD-ON: CPT

## 2022-05-20 PROCEDURE — 96360 HYDRATION IV INFUSION INIT: CPT

## 2022-05-20 PROCEDURE — 250N000011 HC RX IP 250 OP 636: Performed by: PEDIATRICS

## 2022-05-20 PROCEDURE — 96374 THER/PROPH/DIAG INJ IV PUSH: CPT

## 2022-05-20 PROCEDURE — 96376 TX/PRO/DX INJ SAME DRUG ADON: CPT

## 2022-05-20 PROCEDURE — 250N000013 HC RX MED GY IP 250 OP 250 PS 637: Performed by: PEDIATRICS

## 2022-05-20 PROCEDURE — 258N000003 HC RX IP 258 OP 636: Performed by: PEDIATRICS

## 2022-05-20 RX ORDER — HEPARIN SODIUM (PORCINE) LOCK FLUSH IV SOLN 100 UNIT/ML 100 UNIT/ML
5 SOLUTION INTRAVENOUS
Status: CANCELLED | OUTPATIENT
Start: 2022-07-01

## 2022-05-20 RX ORDER — HEPARIN SODIUM (PORCINE) LOCK FLUSH IV SOLN 100 UNIT/ML 100 UNIT/ML
5 SOLUTION INTRAVENOUS
Status: DISCONTINUED | OUTPATIENT
Start: 2022-05-20 | End: 2022-05-20 | Stop reason: HOSPADM

## 2022-05-20 RX ORDER — ONDANSETRON 8 MG/1
8 TABLET, FILM COATED ORAL
Status: CANCELLED
Start: 2022-07-01

## 2022-05-20 RX ORDER — DIPHENHYDRAMINE HCL 25 MG
25 CAPSULE ORAL
Status: COMPLETED | OUTPATIENT
Start: 2022-05-20 | End: 2022-05-20

## 2022-05-20 RX ORDER — HEPARIN SODIUM,PORCINE 10 UNIT/ML
5 VIAL (ML) INTRAVENOUS
Status: CANCELLED | OUTPATIENT
Start: 2022-07-01

## 2022-05-20 RX ORDER — MORPHINE SULFATE 2 MG/ML
2 INJECTION, SOLUTION INTRAMUSCULAR; INTRAVENOUS
Status: COMPLETED | OUTPATIENT
Start: 2022-05-20 | End: 2022-05-20

## 2022-05-20 RX ORDER — MORPHINE SULFATE 2 MG/ML
2 INJECTION, SOLUTION INTRAMUSCULAR; INTRAVENOUS
Status: CANCELLED
Start: 2022-07-01

## 2022-05-20 RX ORDER — DIPHENHYDRAMINE HCL 25 MG
25 CAPSULE ORAL
Status: CANCELLED
Start: 2022-07-01

## 2022-05-20 RX ADMIN — MORPHINE SULFATE 2 MG: 2 INJECTION, SOLUTION INTRAMUSCULAR; INTRAVENOUS at 12:57

## 2022-05-20 RX ADMIN — DEXTROSE AND SODIUM CHLORIDE 1000 ML: 5; 450 INJECTION, SOLUTION INTRAVENOUS at 12:47

## 2022-05-20 RX ADMIN — DIPHENHYDRAMINE HYDROCHLORIDE 25 MG: 25 CAPSULE ORAL at 14:11

## 2022-05-20 RX ADMIN — MORPHINE SULFATE 2 MG: 2 INJECTION, SOLUTION INTRAMUSCULAR; INTRAVENOUS at 13:54

## 2022-05-20 RX ADMIN — MORPHINE SULFATE 2 MG: 2 INJECTION, SOLUTION INTRAMUSCULAR; INTRAVENOUS at 14:52

## 2022-05-20 RX ADMIN — HEPARIN 5 ML: 100 SYRINGE at 15:02

## 2022-05-20 NOTE — PROGRESS NOTES
Infusion Nursing Note:  Jennifer Cervantes presents today for Fluids, pain med.    Patient seen by provider today: No   present during visit today: Not Applicable.    Note: Fluids infused over 2 hours.  Morphine given IV push x 3.  C/O itching after second dose.  PO Benadryl given.      Intravenous Access:  Implanted Port.    Treatment Conditions:  Not Applicable.      Post Infusion Assessment:  Patient tolerated infusion without incident.  No evidence of extravasations.  Access discontinued per protocol.       Discharge Plan:   Patient and/or family verbalized understanding of discharge instructions and all questions answered.  Patient discharged in stable condition accompanied by: self.      PRASANNA TRONCOSO RN

## 2022-05-20 NOTE — TELEPHONE ENCOUNTER
Pt called requesting if prescription can be sent to [facility] instead of [facility], pt getting IVF/pain med at [facility] infusion today   Advised pt to call [place] pharmacy first and see if she they can transfer meds to [facility] pharmacy.   Pt voiced understanding and agreement with plan.

## 2022-05-20 NOTE — PROGRESS NOTES
SW received referral to assist with transportation coordination. SW called [employer] and arranged ride for patient. SW informed patient and clinic of ride information Transportation plus will  patient at 11:30 with will call return ride. Patient will call [phone] for return ride when appointment is complete. SW will remain available as needed.         Corry GONZALEZ, [employer]  - Oncology  Phone : [phone]  Pager: [phone]

## 2022-05-20 NOTE — TELEPHONE ENCOUNTER
Pt called in to triage requesting IVF pain meds for bilateral hip pain pain rated 8/10 x 2 days. Stated last took prn MS contin and oxycodone at 0600 this morning without relief. Denied any fevers, chills, cough, sob, chest pain, numbness or tingling or other symptoms not typical of sickle cell pain.     Pt's last infusion was 5/16, last clinic visit 5/17/22 with follow-up on 6/6/22 with Dr Duncan.     Patient states she does need help with ride. 25 minutes away.    Pt denied being out of home medications and needing any refills today.     Pt qualifies for sickle cell standing order protocol.    If you do not hear from the infusion center by 2pm then you will not be able to get in for an infusion today.     Added to infusion wait list.    [facility] can offer apt at 1230.  Called Jennifer and she confirmed she can come at 1230.  Requested [employer] assistance to coordinate ride.  Updated Arely at [facility], she will schedule.     [employer] secured ride for Arely Rodriguez updated.

## 2022-05-23 ENCOUNTER — INFUSION THERAPY VISIT (OUTPATIENT)
Dept: ONCOLOGY | Facility: CLINIC | Age: 22
End: 2022-05-23
Attending: PEDIATRICS
Payer: COMMERCIAL

## 2022-05-23 ENCOUNTER — TELEPHONE (OUTPATIENT)
Dept: ONCOLOGY | Facility: CLINIC | Age: 22
End: 2022-05-23
Payer: COMMERCIAL

## 2022-05-23 VITALS
DIASTOLIC BLOOD PRESSURE: 83 MMHG | OXYGEN SATURATION: 90 % | SYSTOLIC BLOOD PRESSURE: 144 MMHG | RESPIRATION RATE: 18 BRPM | TEMPERATURE: 98.4 F

## 2022-05-23 DIAGNOSIS — D57.00 SICKLE CELL PAIN CRISIS (H): ICD-10-CM

## 2022-05-23 DIAGNOSIS — G81.10 SPASTIC HEMIPLEGIA, UNSPECIFIED ETIOLOGY, UNSPECIFIED LATERALITY (H): Primary | ICD-10-CM

## 2022-05-23 PROCEDURE — 96361 HYDRATE IV INFUSION ADD-ON: CPT

## 2022-05-23 PROCEDURE — 96374 THER/PROPH/DIAG INJ IV PUSH: CPT

## 2022-05-23 PROCEDURE — 96376 TX/PRO/DX INJ SAME DRUG ADON: CPT

## 2022-05-23 PROCEDURE — 250N000011 HC RX IP 250 OP 636: Performed by: PEDIATRICS

## 2022-05-23 PROCEDURE — 258N000003 HC RX IP 258 OP 636: Performed by: PEDIATRICS

## 2022-05-23 RX ORDER — DIPHENHYDRAMINE HCL 25 MG
25 CAPSULE ORAL
Status: DISCONTINUED | OUTPATIENT
Start: 2022-05-23 | End: 2022-05-23 | Stop reason: HOSPADM

## 2022-05-23 RX ORDER — MORPHINE SULFATE 2 MG/ML
2 INJECTION, SOLUTION INTRAMUSCULAR; INTRAVENOUS
Status: CANCELLED
Start: 2022-07-01

## 2022-05-23 RX ORDER — HEPARIN SODIUM (PORCINE) LOCK FLUSH IV SOLN 100 UNIT/ML 100 UNIT/ML
5 SOLUTION INTRAVENOUS
Status: CANCELLED | OUTPATIENT
Start: 2022-07-01

## 2022-05-23 RX ORDER — HEPARIN SODIUM,PORCINE 10 UNIT/ML
5 VIAL (ML) INTRAVENOUS
Status: CANCELLED | OUTPATIENT
Start: 2022-07-01

## 2022-05-23 RX ORDER — HEPARIN SODIUM (PORCINE) LOCK FLUSH IV SOLN 100 UNIT/ML 100 UNIT/ML
5 SOLUTION INTRAVENOUS
Status: DISCONTINUED | OUTPATIENT
Start: 2022-05-23 | End: 2022-05-23 | Stop reason: HOSPADM

## 2022-05-23 RX ORDER — ONDANSETRON 8 MG/1
8 TABLET, FILM COATED ORAL
Status: CANCELLED
Start: 2022-07-01

## 2022-05-23 RX ORDER — MORPHINE SULFATE 2 MG/ML
2 INJECTION, SOLUTION INTRAMUSCULAR; INTRAVENOUS
Status: DISCONTINUED | OUTPATIENT
Start: 2022-05-23 | End: 2022-05-23 | Stop reason: HOSPADM

## 2022-05-23 RX ORDER — DIPHENHYDRAMINE HCL 25 MG
25 CAPSULE ORAL
Status: CANCELLED
Start: 2022-07-01

## 2022-05-23 RX ADMIN — Medication 5 ML: at 12:13

## 2022-05-23 RX ADMIN — DEXTROSE AND SODIUM CHLORIDE 1000 ML: 5; 450 INJECTION, SOLUTION INTRAVENOUS at 09:56

## 2022-05-23 RX ADMIN — MORPHINE SULFATE 2 MG: 2 INJECTION, SOLUTION INTRAMUSCULAR; INTRAVENOUS at 10:54

## 2022-05-23 RX ADMIN — MORPHINE SULFATE 2 MG: 2 INJECTION, SOLUTION INTRAMUSCULAR; INTRAVENOUS at 09:56

## 2022-05-23 RX ADMIN — MORPHINE SULFATE 2 MG: 2 INJECTION, SOLUTION INTRAMUSCULAR; INTRAVENOUS at 11:54

## 2022-05-23 NOTE — PATIENT INSTRUCTIONS
[facility] Triage and after hours / weekends / holidays:  [phone]    Please call the triage or after hours line if you experience a temperature greater than or equal to 100.4, shaking chills, have uncontrolled nausea, vomiting and/or diarrhea, dizziness, shortness of breath, chest pain, bleeding, unexplained bruising, or if you have any other new/concerning symptoms, questions or concerns.      If you are having any concerning symptoms or wish to speak to a provider before your next infusion visit, please call your care coordinator or triage to notify them so we can adequately serve you.     If you need a refill on a narcotic prescription or other medication, please call before your infusion appointment.             May 2022      [place] Monday Tuesday Wednesday Thursday Friday Saturday   1     2    ONC INFUSION 2 HR (120 MIN)   1:30 PM   (120 min.)    ONC INFUSION NURSE   [facility] 3     4     5     6    ONC INFUSION 2 HR (120 MIN)  12:00 PM   (120 min.)    ONC INFUSION NURSE   [facility] 7       8     9    ONC INFUSION 2 HR (120 MIN)  11:00 AM   (120 min.)    ONC INFUSION NURSE   [facility] 10     11     12     13    BMT INFUSION 2 HR (120 MIN)   9:00 AM   (120 min.)    BMT INFUSION NURSE   [facility] 14       15     16    [facility] NURSE VISIT   9:30 AM   (30 min.)   Nurse, [facility] [place]    BMT INFUSION 2 HR (120 MIN)  10:00 AM   (120 min.)    BMT INFUSION NURSE   [facility] 17    LAB PERIPHERAL   3:30 PM   (15 min.)   [facility] LAB DRAW   [facility]    RETURN   3:45 PM   (45 min.)   Patricia Mantilla CNP   [facility] 18     19     20    INFUSION 3 HR (180 MIN)  12:30 PM   (180 min.)   DAYAN  05   [facility]  [facility] 21       22     23    ONC INFUSION 2 HR (120 MIN)  10:00 AM   (120 min.)    ONC INFUSION NURSE   [facility] 24     25    ADDICTION MED NEW   9:45 AM   (60 min.)   Eric Gentile MD   [facility] [place] 26    ADULT HEARING EVALUATION   7:45 AM   (60 min.)   Aide Mack AuD   [facility] 27     28       29 30 31 June 2022 Sunday Monday Tuesday Wednesday Thursday Friday Saturday                  1     2     3     4       5     6    LAB PERIPHERAL  10:30 AM   (15 min.)    [facility] DRAW   [facility]    RETURN  10:45 AM   (30 min.)   Eric Duncan MD   [facility]    ONC INFUSION 2 HR (120 MIN)  11:30 AM   (120 min.)    ONC INFUSION NURSE   [facility] 7     8     9     10     11       12     13     14     15    [facility]  10:15 AM   (30 min.)   Suraj Case MD   [facility] [place] 16     17     18       19     20     21     22     23     24     25       26     27     28     29     30                                No results found for this or any previous visit (from the past 24 hour(s)).

## 2022-05-23 NOTE — CONFIDENTIAL NOTE
[id number] called in to triage requesting IVF pain meds for back and lower back pain rated 8/10 x  2 days. Stated last took prn Oxycodone and MS contin at 6 am  this morning without relief. Denied any fevers, chills, cough, sob, chest pain, numbness or tingling or other symptoms not typical of sickle cell pain.   Pt's last infusion was 5/20/22, last clinic visit 5/17/22 with follow-up on 6/6/22  with Dr Duncan .   Patient states they do not have own ride and it will take 60 minutes long to get to cancer clinic.   Pt denied being out of home medications and needing any refills today.   Pt qualifies for sickle cell standing order protocol.  If you do not hear from the infusion center by 2pm then you will not be able to get in for an infusion today.   Please note, if you are late for your appt, you risk losing your infusion appt as it may delay another patient's infusion who arrived on time.     Ange can take Jennifer at 10am for IVF/Pain meds   Message sent to social work to work on ride for her     Message sent to scheduling to schedule appointment

## 2022-05-23 NOTE — PROGRESS NOTES
Infusion Nursing Note:  Jennifer Cervantes presents today for IV fluids and pain meds.    Patient seen by provider today: No   present during visit today: Not Applicable.    Note: Patient arrived to infusion reporting 9/10 lower back pain that started 2 days ago. States she didn't want to go to the ED over the weekend.  Pain is consistent with patient's typical sickle cell crisis pain. Morphine given hourly IV x 3 with pain down to 5/10.  Patient stated this was her tolerable/goal pain level and was comfortable discharging to home.       Intravenous Access:  Implanted Port accessed in infusion    Treatment Conditions:  Not Applicable.      Post Infusion Assessment:  Patient tolerated infusion without incident.  Blood return noted pre and post infusion.  Site patent and intact, free from redness, edema or discomfort.  No evidence of extravasations.  Access discontinued per protocol.       Discharge Plan:   Patient declined prescription refills.  Discharge instructions reviewed with: Patient.  Patient and/or family verbalized understanding of discharge instructions and all questions answered.  Copy of AVS reviewed with patient and/or family.  Patient will return 6/6/2022 for next appointment.  Patient discharged in stable condition accompanied by: self.  Departure Mode: Ambulatory.  Face to Face time: 0.      Melissa Oliver RN

## 2022-05-25 ENCOUNTER — TELEPHONE (OUTPATIENT)
Dept: BEHAVIORAL HEALTH | Facility: CLINIC | Age: 22
End: 2022-05-25

## 2022-05-25 DIAGNOSIS — D57.00 SICKLE CELL PAIN CRISIS (H): ICD-10-CM

## 2022-05-25 RX ORDER — MORPHINE SULFATE 15 MG/1
15 TABLET, FILM COATED, EXTENDED RELEASE ORAL EVERY 12 HOURS
Qty: 60 TABLET | Refills: 0 | Status: SHIPPED | OUTPATIENT
Start: 2022-05-25 | End: 2022-06-06

## 2022-05-25 RX ORDER — OXYCODONE HYDROCHLORIDE 15 MG/1
15 TABLET ORAL EVERY 4 HOURS PRN
Qty: 45 TABLET | Refills: 0 | Status: SHIPPED | OUTPATIENT
Start: 2022-05-25 | End: 2022-06-02

## 2022-05-25 NOTE — TELEPHONE ENCOUNTER
Refill Request    Date of most recent appointment:  5/17/22  Next upcoming appointment:   6/6/22  Prescribing provider(s):  Jose Rafael  Person requesting refill:  Jennifer    Medication requested:    Oxycodone 15 mg tab, quantity 45, last fill on 5/19/22, taking 6 per day  MS Contin 15 mg CR tablet, quantity 60, last filled on 4/29/22, taking two tabs per day    Pt has enough oxy through Friday; has enough MS contin until Sunday, but pharmacy will be closed on Sunday.    Not  reviewed.    Routed to Patricia Mantilla, CNP

## 2022-05-25 NOTE — TELEPHONE ENCOUNTER
Writer spoke with patient regarding missed appointment on 05/25/2022. Patient states something is wrong with medical insurance but will reschedule. Writer gave patient number to schedule appointment.       Ember Hays MA

## 2022-05-27 ENCOUNTER — PATIENT OUTREACH (OUTPATIENT)
Dept: CARE COORDINATION | Facility: CLINIC | Age: 22
End: 2022-05-27
Payer: COMMERCIAL

## 2022-05-27 ENCOUNTER — INFUSION THERAPY VISIT (OUTPATIENT)
Dept: ONCOLOGY | Facility: CLINIC | Age: 22
End: 2022-05-27
Attending: PEDIATRICS
Payer: COMMERCIAL

## 2022-05-27 ENCOUNTER — TELEPHONE (OUTPATIENT)
Dept: ONCOLOGY | Facility: CLINIC | Age: 22
End: 2022-05-27
Payer: COMMERCIAL

## 2022-05-27 VITALS
HEART RATE: 100 BPM | OXYGEN SATURATION: 92 % | DIASTOLIC BLOOD PRESSURE: 55 MMHG | TEMPERATURE: 98.2 F | RESPIRATION RATE: 18 BRPM | BODY MASS INDEX: 29.21 KG/M2 | SYSTOLIC BLOOD PRESSURE: 143 MMHG | WEIGHT: 170.2 LBS

## 2022-05-27 DIAGNOSIS — G81.10 SPASTIC HEMIPLEGIA, UNSPECIFIED ETIOLOGY, UNSPECIFIED LATERALITY (H): Primary | ICD-10-CM

## 2022-05-27 DIAGNOSIS — D57.00 SICKLE CELL PAIN CRISIS (H): ICD-10-CM

## 2022-05-27 PROCEDURE — 96376 TX/PRO/DX INJ SAME DRUG ADON: CPT

## 2022-05-27 PROCEDURE — 258N000003 HC RX IP 258 OP 636: Performed by: PEDIATRICS

## 2022-05-27 PROCEDURE — 250N000011 HC RX IP 250 OP 636: Performed by: PEDIATRICS

## 2022-05-27 PROCEDURE — 96374 THER/PROPH/DIAG INJ IV PUSH: CPT

## 2022-05-27 PROCEDURE — 96361 HYDRATE IV INFUSION ADD-ON: CPT

## 2022-05-27 RX ORDER — ONDANSETRON 8 MG/1
8 TABLET, FILM COATED ORAL
Status: CANCELLED
Start: 2022-07-01

## 2022-05-27 RX ORDER — MORPHINE SULFATE 2 MG/ML
2 INJECTION, SOLUTION INTRAMUSCULAR; INTRAVENOUS
Status: DISCONTINUED | OUTPATIENT
Start: 2022-05-27 | End: 2022-05-27 | Stop reason: HOSPADM

## 2022-05-27 RX ORDER — HEPARIN SODIUM (PORCINE) LOCK FLUSH IV SOLN 100 UNIT/ML 100 UNIT/ML
5 SOLUTION INTRAVENOUS
Status: CANCELLED | OUTPATIENT
Start: 2022-07-01

## 2022-05-27 RX ORDER — MORPHINE SULFATE 2 MG/ML
2 INJECTION, SOLUTION INTRAMUSCULAR; INTRAVENOUS
Status: CANCELLED
Start: 2022-07-01

## 2022-05-27 RX ORDER — HEPARIN SODIUM (PORCINE) LOCK FLUSH IV SOLN 100 UNIT/ML 100 UNIT/ML
5 SOLUTION INTRAVENOUS
Status: DISCONTINUED | OUTPATIENT
Start: 2022-05-27 | End: 2022-05-27 | Stop reason: HOSPADM

## 2022-05-27 RX ORDER — DIPHENHYDRAMINE HCL 25 MG
25 CAPSULE ORAL
Status: CANCELLED
Start: 2022-07-01

## 2022-05-27 RX ORDER — HEPARIN SODIUM,PORCINE 10 UNIT/ML
5 VIAL (ML) INTRAVENOUS
Status: CANCELLED | OUTPATIENT
Start: 2022-07-01

## 2022-05-27 RX ORDER — DIPHENHYDRAMINE HCL 25 MG
25 CAPSULE ORAL
Status: DISCONTINUED | OUTPATIENT
Start: 2022-05-27 | End: 2022-05-27 | Stop reason: HOSPADM

## 2022-05-27 RX ADMIN — Medication 5 ML: at 14:07

## 2022-05-27 RX ADMIN — MORPHINE SULFATE 2 MG: 2 INJECTION, SOLUTION INTRAMUSCULAR; INTRAVENOUS at 12:37

## 2022-05-27 RX ADMIN — DEXTROSE AND SODIUM CHLORIDE 1000 ML: 5; 450 INJECTION, SOLUTION INTRAVENOUS at 11:40

## 2022-05-27 RX ADMIN — MORPHINE SULFATE 2 MG: 2 INJECTION, SOLUTION INTRAMUSCULAR; INTRAVENOUS at 11:40

## 2022-05-27 RX ADMIN — MORPHINE SULFATE 2 MG: 2 INJECTION, SOLUTION INTRAMUSCULAR; INTRAVENOUS at 13:37

## 2022-05-27 NOTE — PROGRESS NOTES
Social Work Note: Telephone Call  Oncology Clinic     Data/Intervention:  Patient Name:  Jennifer Cervantes DOB/Age: 2000     Call From: [facility]        Reason for Call:  Transportation     Assessment:   called [employer] to arrange ride through patient's insurance. [facility] arranged  for patient from home with [employer] ([phone]).  Patient will need to call when ready for return ride home ([phone]).      Plan:  Patient is aware of the transportation plan.  available to assist with any other needs.      CARLOS Chavez,[facility]  Hematology/Oncology Social Worker  Phone:[phone] Pager: [phone]

## 2022-05-27 NOTE — PATIENT INSTRUCTIONS
Contact Numbers  [facility]: [phone]    After Hours:  [phone]  Triage: [phone]    Please call the [facility] Triage line if you experience a temperature greater than or equal to 100.5, shaking chills, have uncontrolled nausea, vomiting and/or diarrhea, dizziness, shortness of breath, chest pain, bleeding, unexplained bruising, or if you have any other new/concerning symptoms, questions or concerns.     If it is after hours, weekends, or holidays, please call the main hospital  at  [phone] and ask to speak to the Oncology doctor on call.     If you are having any concerning symptoms or wish to speak to a provider before your next infusion visit, please call your care coordinator or triage to notify them so we can adequately serve you.     If you need a refill on a narcotic prescription or other medication, please call triage before your infusion appointment.       May 2022      [place] Monday Tuesday Wednesday Thursday Friday Saturday   1     2    ONC INFUSION 2 HR (120 MIN)   1:30 PM   (120 min.)    ONC INFUSION NURSE   [facility] 3     4     5     6    ONC INFUSION 2 HR (120 MIN)  12:00 PM   (120 min.)    ONC INFUSION NURSE   [facility] 7       8     9    ONC INFUSION 2 HR (120 MIN)  11:00 AM   (120 min.)    ONC INFUSION NURSE   [facility] 10     11     12     13    BMT INFUSION 2 HR (120 MIN)   9:00 AM   (120 min.)    BMT INFUSION NURSE   [facility] 14       15     16    UMP NURSE VISIT   9:30 AM   (30 min.)   Nurse,  Pcc   [facility] [place]    BMT INFUSION 2 HR (120 MIN)  10:00 AM   (120 min.)    BMT INFUSION NURSE   [facility] Marrow Trans[facility] 17    LAB PERIPHERAL   3:30 PM   (15 min.)   [facility] LAB DRAW   [facility]  Clinic    RETURN   3:45 PM   (45 min.)   Patricia Mantilla CNP   [facility] 18     19     20    INFUSION 3 HR (180 MIN)  12:30 PM   (180 min.)   [facility] 05   [facility] 21       22     23    ONC INFUSION 2 HR (120 MIN)  10:00 AM   (120 min.)    ONC INFUSION NURSE   [facility] 24     25    ADDICTION MED NEW   9:45 AM   (60 min.)   Eric Gentile MD   [facility] 26     27    ONC INFUSION 2 HR (120 MIN)  12:00 PM   (120 min.)    ONC INFUSION NURSE   [facility] 28       29     30     31 June 2022 Sunday Monday Tuesday Wednesday Thursday Friday Saturday                  1     2    ADULT HEARING EVALUATION   6:45 AM   (60 min.)   Aide Mack AuD   [facility] 3     4       5     6    LAB PERIPHERAL  10:30 AM   (15 min.)   [facility] LAB DRAW   [facility]    RETURN  10:45 AM   (30 min.)   Eric Duncan MD   [facility]    ONC INFUSION 2 HR (120 MIN)  11:30 AM   (120 min.)    ONC INFUSION NURSE   [facility] 7     8     9     10     11       12     13     14     15    [facility] PHYSICAL  10:15 AM   (30 min.)   Suraj Case MD   [facility] 16     17     18       19     20     21     22     23     24     25       26     27     28     29     30                                 Lab Results:  No results found for this or any previous visit (from the past 12 hour(s)).

## 2022-05-27 NOTE — PROGRESS NOTES
Infusion Nursing Note:  Jennifer Cervantes presents today for IVF/pain medication.    Patient seen by provider today: No   present during visit today: Not Applicable.    Note: Patient endorses non radiating constant sharp pain on her lower back with PS 9/10. 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 No new complaints made. Otherwise well.     Patient had 3 doses of Morphine with PS 5/10. Patient verbalized knowledge on where and to call if symptoms persists/worsen.      Intravenous Access:  Implanted Port.    Treatment Conditions:  Not Applicable.      Post Infusion Assessment:  Patient tolerated infusion without incident.  Blood return noted pre and post infusion.  Site patent and intact, free from redness, edema or discomfort.  No evidence of extravasations.  Access discontinued per protocol.       Discharge Plan:   Patient prescription refills. MS contin and Oxycodone  Discharge instructions reviewed with: Patient.  Patient and/or family verbalized understanding of discharge instructions and all questions answered.  Copy of AVS reviewed with patient and/or family.  Patient will return 6/6/22 for next appointment.  Patient discharged in stable condition accompanied by: self.  Departure Mode: Ambulatory.      GLORIA YANCEY RN

## 2022-05-27 NOTE — CONFIDENTIAL NOTE
Pt called in to triage requesting IVF pain meds for low back  pain rated 8/10 x 6 days. Stated last took prn oxycodone and MS Contin at 6am this morning without relief. Denied any fevers, chills, cough, sob, chest pain, numbness or tingling or other symptoms not typical of sickle cell pain.   Pt's last infusion was 5/23/22, last clinic visit 5/17/22 with follow-up on 6/6/22 with Dr Duncan.   Patient states they do not have own ride and it will take 60 minutes long to get to cancer clinic.   Pt denied being out of home medications and needing any refills today.   Pt qualifies for sickle cell standing order protocol.  If you do not hear from the infusion center by 2pm then you will not be able to get in for an infusion today.   Please note, if you are late for your appt, you risk losing your infusion appt as it may delay another patient's infusion who arrived on time.     7:11 Ange can take Jennifer at 12:00 for IVF/Pain   7:12 Jennifer states she can come in at 12:00 for IVF/pain with [facility]     Message sent to social work to assist with setting up ride and to scheduling to schedule appointment.

## 2022-05-31 ENCOUNTER — INFUSION THERAPY VISIT (OUTPATIENT)
Dept: TRANSPLANT | Facility: CLINIC | Age: 22
End: 2022-05-31
Attending: PEDIATRICS
Payer: COMMERCIAL

## 2022-05-31 ENCOUNTER — PATIENT OUTREACH (OUTPATIENT)
Dept: CARE COORDINATION | Facility: CLINIC | Age: 22
End: 2022-05-31
Payer: COMMERCIAL

## 2022-05-31 ENCOUNTER — TELEPHONE (OUTPATIENT)
Dept: ONCOLOGY | Facility: CLINIC | Age: 22
End: 2022-05-31
Payer: COMMERCIAL

## 2022-05-31 VITALS
DIASTOLIC BLOOD PRESSURE: 84 MMHG | HEART RATE: 104 BPM | SYSTOLIC BLOOD PRESSURE: 118 MMHG | OXYGEN SATURATION: 91 % | TEMPERATURE: 99.3 F | RESPIRATION RATE: 20 BRPM

## 2022-05-31 DIAGNOSIS — D57.00 SICKLE CELL PAIN CRISIS (H): ICD-10-CM

## 2022-05-31 DIAGNOSIS — G81.10 SPASTIC HEMIPLEGIA, UNSPECIFIED ETIOLOGY, UNSPECIFIED LATERALITY (H): Primary | ICD-10-CM

## 2022-05-31 PROCEDURE — 250N000013 HC RX MED GY IP 250 OP 250 PS 637: Performed by: PEDIATRICS

## 2022-05-31 PROCEDURE — 258N000003 HC RX IP 258 OP 636: Performed by: PEDIATRICS

## 2022-05-31 PROCEDURE — 250N000011 HC RX IP 250 OP 636: Performed by: PEDIATRICS

## 2022-05-31 PROCEDURE — 96374 THER/PROPH/DIAG INJ IV PUSH: CPT

## 2022-05-31 PROCEDURE — 96376 TX/PRO/DX INJ SAME DRUG ADON: CPT

## 2022-05-31 PROCEDURE — 96361 HYDRATE IV INFUSION ADD-ON: CPT

## 2022-05-31 RX ORDER — DIPHENHYDRAMINE HCL 25 MG
25 CAPSULE ORAL
Status: COMPLETED | OUTPATIENT
Start: 2022-05-31 | End: 2022-05-31

## 2022-05-31 RX ORDER — ONDANSETRON 8 MG/1
8 TABLET, FILM COATED ORAL
Status: CANCELLED
Start: 2022-07-01

## 2022-05-31 RX ORDER — MORPHINE SULFATE 2 MG/ML
2 INJECTION, SOLUTION INTRAMUSCULAR; INTRAVENOUS
Status: DISCONTINUED | OUTPATIENT
Start: 2022-05-31 | End: 2022-05-31 | Stop reason: HOSPADM

## 2022-05-31 RX ORDER — DIPHENHYDRAMINE HCL 25 MG
25 CAPSULE ORAL
Status: CANCELLED
Start: 2022-07-01

## 2022-05-31 RX ORDER — HEPARIN SODIUM,PORCINE 10 UNIT/ML
5 VIAL (ML) INTRAVENOUS
Status: CANCELLED | OUTPATIENT
Start: 2022-07-01

## 2022-05-31 RX ORDER — HEPARIN SODIUM (PORCINE) LOCK FLUSH IV SOLN 100 UNIT/ML 100 UNIT/ML
5 SOLUTION INTRAVENOUS
Status: CANCELLED | OUTPATIENT
Start: 2022-07-01

## 2022-05-31 RX ORDER — MORPHINE SULFATE 2 MG/ML
2 INJECTION, SOLUTION INTRAMUSCULAR; INTRAVENOUS
Status: CANCELLED
Start: 2022-07-01

## 2022-05-31 RX ADMIN — DEXTROSE AND SODIUM CHLORIDE 1000 ML: 5; 450 INJECTION, SOLUTION INTRAVENOUS at 11:45

## 2022-05-31 RX ADMIN — MORPHINE SULFATE 2 MG: 2 INJECTION, SOLUTION INTRAMUSCULAR; INTRAVENOUS at 12:53

## 2022-05-31 RX ADMIN — MORPHINE SULFATE 2 MG: 2 INJECTION, SOLUTION INTRAMUSCULAR; INTRAVENOUS at 14:03

## 2022-05-31 RX ADMIN — MORPHINE SULFATE 2 MG: 2 INJECTION, SOLUTION INTRAMUSCULAR; INTRAVENOUS at 11:45

## 2022-05-31 RX ADMIN — DIPHENHYDRAMINE HYDROCHLORIDE 25 MG: 25 CAPSULE ORAL at 11:41

## 2022-05-31 ASSESSMENT — PAIN SCALES - GENERAL: PAINLEVEL: EXTREME PAIN (8)

## 2022-05-31 NOTE — LETTER
5/31/2022         RE: Jennifer Cervantes  [address]  [facility] [place] [address]        Dear Colleague,    Thank you for referring your patient, Jennifer Cervantes, to the [facility] BLOOD AND MARROW TRANSPLANT PROGRAM [place]. Please see a copy of my visit note below.    Infusion Nursing Note:  Jennifer Cervantes presents today for IV fluids and pain meds.    Patient seen by provider today: No   present during visit today: Not Applicable.     Note: Patient arrived to infusion reporting 9/10 generalized pain all over.  Pain is consistent with patient's typical sickle cell crisis pain. Morphine given hourly IV x 3 with pain down to 5/10.  Patient stated this was her tolerable/goal pain level and was comfortable discharging to home.         Intravenous Access:  Implanted Port accessed in infusion     Treatment Conditions:  Not Applicable.        Post Infusion Assessment:  Patient tolerated infusion without incident.  Blood return noted pre and post infusion.  Site patent and intact, free from redness, edema or discomfort.  No evidence of extravasations.  Access discontinued per protocol.         Discharge Plan:   Patient declined prescription refills.  Discharge instructions reviewed with: Patient.  Patient and/or family verbalized understanding of discharge instructions and all questions answered.  Copy of AVS reviewed with patient and/or family.  Patient will return 6/6/2022 for next appointment.  Patient discharged in stable condition accompanied by: self.  Departure Mode: Ambulatory.           Again, thank you for allowing me to participate in the care of your patient.        Sincerely,        [facility]

## 2022-05-31 NOTE — TELEPHONE ENCOUNTER
Oncology Nurse Triage - Sickle Cell Pain Crisis:    Treating Provider:  Patricia Mantilla     Date of last office visit:  5/17/22    Did they no show to last appointment: No     Date of last infusion: 5/27/22    Onset of symptoms: 5/292/22 generalized pain      Duration of symptoms: 2 day    Does pain feel like patients typical sickle cell pain? Yes     Have home medications been tried:  Yes    Last home medication taken and when: 6 am last dose oxycodone and oxycont    Number of doses of home medications have been attempted and when was first dose taken:      Other home therapies attempted: HEAT/HEATING PAD and WARM BATH     Is patient out of home medications: Yes     Has patient been seen in ED or infusion in the last 3 days? (if yes, when): No    Does patient have active treatment plan?  Yes      Symptom assessment:    Fever (if yes max temperature recorded in last 24 hours):  No    Chest Pain Present (if yes when?): No       Shortness of breath: No     GI symptoms:   Nausea yesterday resolved     Urinary symptoms:   No changes in voiding. Normal frequency and characteristics.    Oral intake:   Regular diet, tolerating without issues.     Additional information (if necessary):     Does patient have transportation: No needs transportation              Grades for reference:       Grade 1 -   o Patient has active treatment plan.   o Patients last scheduled clinic appointment was attended  o Patient has not been seen in infusion or ED in the last 3 days (clarify exclusions in therapy plans)   o Afebrile   o Typically sickle cell pain   o Home medications have been tried   o No other symptoms   o     Recommendations:   Pt meets protocol for IVF for per protocol. Advised pt if she does not hear from us by 1400 to seek care from ED or call back tomorrow morning.   Please note, if you are late for your appt, you risk losing your infusion appt as it may delay another patient's infusion who arrived on time.   BMT can take pt at  1300   Writer called pt and confirmed appt time.   Message sent to SW to arrange transportation and to call pt   Message sent to scheduling to add pt to BMT's schedule   Pt voiced understanding and agreement with plan.

## 2022-05-31 NOTE — PROGRESS NOTES
Infusion Nursing Note:  Jennifer Cervantes presents today for IV fluids and pain meds.    Patient seen by provider today: No   present during visit today: Not Applicable.     Note: Patient arrived to infusion reporting 9/10 generalized pain all over.  Pain is consistent with patient's typical sickle cell crisis pain. Morphine given hourly IV x 3 with pain down to 5/10.  Patient stated this was her tolerable/goal pain level and was comfortable discharging to home.         Intravenous Access:  Implanted Port accessed in infusion     Treatment Conditions:  Not Applicable.        Post Infusion Assessment:  Patient tolerated infusion without incident.  Blood return noted pre and post infusion.  Site patent and intact, free from redness, edema or discomfort.  No evidence of extravasations.  Access discontinued per protocol.         Discharge Plan:   Patient declined prescription refills.  Discharge instructions reviewed with: Patient.  Patient and/or family verbalized understanding of discharge instructions and all questions answered.  Copy of AVS reviewed with patient and/or family.  Patient will return 6/6/2022 for next appointment.  Patient discharged in stable condition accompanied by: self.  Departure Mode: Ambulatory.

## 2022-05-31 NOTE — PROGRESS NOTES
Social Work Note: Telephone Call  Oncology Clinic     Data/Intervention:  Patient Name:  Jennifer Cervantes  /Age: 2000, 22 years old     Call From: [facility] Triage        Reason for Call:  Transportation     Assessment:   called [employer] to arrange ride through patient's insurance. [employer] arranged  for patient from home with [employer] ([phone]).  Patient will need to call when ready for return ride home ([phone]).      Plan:  Patient is aware of the transportation plan.  available to assist with any other needs.      CARLOS Chavez,[facility]  Hematology/Oncology Social Worker  Phone:[phone] Pager: [phone]

## 2022-06-02 ENCOUNTER — TELEPHONE (OUTPATIENT)
Dept: AUDIOLOGY | Facility: CLINIC | Age: 22
End: 2022-06-02
Payer: COMMERCIAL

## 2022-06-02 DIAGNOSIS — D57.00 SICKLE CELL PAIN CRISIS (H): ICD-10-CM

## 2022-06-02 RX ORDER — OXYCODONE HYDROCHLORIDE 15 MG/1
15 TABLET ORAL EVERY 4 HOURS PRN
Qty: 45 TABLET | Refills: 0 | Status: SHIPPED | OUTPATIENT
Start: 2022-06-02 | End: 2022-06-09

## 2022-06-02 NOTE — TELEPHONE ENCOUNTER
"Refill Request    Date of most recent appointment:  5/17/22  Next upcoming appointment:   6/6/22  Prescribing provider(s):  Patricia Mantilla CNP  Person requesting refill:  Jennifer    Medication requested:  Oxycodone 15 mg tablet  Quantity:  45  Last fill date:  5/25/22    Notes:  Pt will be out of medication on Friday. Please note: pt asked when she is due for a refill. Would not completely disclose number of tabs remaining, but says she has enough. States she takes 6 tabs/day.   Pain being treated sickle cell pain  Number of pills remaining: Pt wouldn't say, says, \"I have enough.\"  When will pt be out of meds: Friday, 6/3  Side effects: denies  Number of times pt takes per day: States she takes 6 tabs/day  Other NA    Not  reviewed.      "

## 2022-06-02 NOTE — PROGRESS NOTES
AUDIOLOGY REPORT    SUBJECTIVE:  Jennifer Cervantes is a 22 year old female who was seen on 6/6/22 in the Audiology Clinic at the [facility] for audiologic evaluation/hearing monitoring, referred by Eric Duncan M.D. Patient presents with history of sickle cell disease and iron overload due to repeated red blood cell transfusion.  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 She denies hearing difficulties, changes in her hearing, otalgia, otorrhea, aural fullness, tinnitus, dizziness and history of otologic surgeries.     The patient's baseline testing was completed on 7/29/2020 when the patient had normal hearing in both ears.      OBJECTIVE:  Otoscopic exam indicated ears are clear of cerumen bilaterally.      Pure Tone Thresholds assessed using conventional audiometry with good reliability from 250-8000 Hz bilaterally using insert earphones and circumaural headphones.       RIGHT:  Normal hearing    LEFT:  Normal hearing    High frequency audiometry from 9-16kHz was performed.   Right Ear: Below age range norms 9k-16kHz, no normative data for 18k-20kHz so did not test. Compared to 7/29/2020 baseline: Decreased 10 dB at 11.2 and 16kHz, 15 dB at 14kHz and 25 dB at 12.5kHz.    Left Ear: Below age range norms at 9k and 12.5-16kHz, no normative data for 18k-20kHz so did not test. Compared to 7/29/2020 baseline: Decreased 10 dB at 12.5 and 20 dB at 14kHz.  Continues to have asymmetry in extended high frequencies (right ear worse than left ear).       Distortion product otoacoustic emissions were performed from 1500 Hz to 10,000 Hz.  Right: Absent at 6kHz  Left: Absent at 8-10kHz  No significant change    Tympanogram:    RIGHT: Normal eardrum mobility    LEFT:  Essentially normal eardrum mobility    Reflexes (reported by stimulus ear):  RIGHT: Ipsilateral: present at normal levels  RIGHT: Contralateral: present at normal levels  LEFT:   Ipsilateral: present at normal  levels  LEFT:   Contralateral: present at normal levels      Speech Reception Threshold:    RIGHT: 15 dB HL, aligns with PTA    LEFT:   10 dB HL, aligns with PTA  Word Recognition Score:     RIGHT: 96% at 55  dB HL using NU-6 recorded word list.    LEFT:   100% at 5 dB HL using NU-6 recorded word list.      ASSESSMENT:    Compared to 7/29/2020 testing from 250-8000 Hz:  Right ear decreased 10 dB at 500-2000 and 7710-8891 Hz and decreased 15 dB at 4000 Hz  Left ear decreased 10 dB at 500, 2000 and 8000 Hz      CTCAE4.03 Scale: No grade in right or left ear    GRADE 1: Adults enrolled on a Monitoring  Program (on a 1, 2, 3, 4, 6 and  8 kHz audiogram): Threshold  shift of 15 - 25 dB averaged at 2  contiguous test frequencies in at  least one ear.  Adults not enrolled in Monitoring  Program: subjective change in  hearing in the absence of  documented hearing loss.    GRADE 2:Adults enrolled in Monitoring  Program (on a 1, 2, 3, 4, 6 and  8 kHz audiogram): Threshold  shift of >25 dB averaged at 2  contiguous test frequencies in at  least one ear.  Adults not enrolled in Monitoring  Program: hearing loss but  hearing aid or intervention not  indicated; limiting instrumental  ADL.    GRADE 3:  Adults enrolled in Monitoring  Program (on a 1, 2, 3, 4, 6 and  8 kHz audiogram): Threshold  shift of >25 dB averaged at 3  contiguous test frequencies in at  least one ear; therapeutic  intervention indicated.  Adults not enrolled in Monitoring  Program: hearing loss with  hearing aid or intervention  indicated; limiting self care ADL.    GRADE 4:  Adults: Decrease in hearing to  profound bilateral loss (absolute  threshold >80 dB HL at 2 kHz  and above); non-servicable  Hearing.                                      PLAN:  While there is no significant change in grade on the CTCAE4.03 Scale, there were hearing changes bilaterally for both standard and extended high frequency audiometry.  Patient should follow up with Dr. Duncan  regarding these changes, to determine how frequently the hearing should be retested for monitoring and to determine if an ENT consult is advised due to the asymmetry in extended high frequency testing.      The patient expressed understanding and agreement with this plan.    Dave Saeed, CCC-A, [facility]  Licensed Audiologist  [place] #[id number]    Enclosure: audiogram      Cc Eric Duncan M.D.

## 2022-06-03 ENCOUNTER — TELEPHONE (OUTPATIENT)
Dept: ONCOLOGY | Facility: CLINIC | Age: 22
End: 2022-06-03
Payer: COMMERCIAL

## 2022-06-03 ENCOUNTER — INFUSION THERAPY VISIT (OUTPATIENT)
Dept: INFUSION THERAPY | Facility: CLINIC | Age: 22
End: 2022-06-03
Attending: PEDIATRICS
Payer: COMMERCIAL

## 2022-06-03 ENCOUNTER — PATIENT OUTREACH (OUTPATIENT)
Dept: CARE COORDINATION | Facility: CLINIC | Age: 22
End: 2022-06-03
Payer: COMMERCIAL

## 2022-06-03 VITALS
TEMPERATURE: 98.3 F | RESPIRATION RATE: 16 BRPM | DIASTOLIC BLOOD PRESSURE: 80 MMHG | OXYGEN SATURATION: 92 % | SYSTOLIC BLOOD PRESSURE: 137 MMHG | HEART RATE: 114 BPM

## 2022-06-03 DIAGNOSIS — D57.00 SICKLE CELL PAIN CRISIS (H): ICD-10-CM

## 2022-06-03 DIAGNOSIS — G81.10 SPASTIC HEMIPLEGIA, UNSPECIFIED ETIOLOGY, UNSPECIFIED LATERALITY (H): Primary | ICD-10-CM

## 2022-06-03 PROCEDURE — 96361 HYDRATE IV INFUSION ADD-ON: CPT

## 2022-06-03 PROCEDURE — 96374 THER/PROPH/DIAG INJ IV PUSH: CPT

## 2022-06-03 PROCEDURE — 250N000011 HC RX IP 250 OP 636: Performed by: PEDIATRICS

## 2022-06-03 PROCEDURE — 96376 TX/PRO/DX INJ SAME DRUG ADON: CPT

## 2022-06-03 PROCEDURE — 258N000003 HC RX IP 258 OP 636: Performed by: PEDIATRICS

## 2022-06-03 RX ORDER — DIPHENHYDRAMINE HCL 25 MG
25 CAPSULE ORAL
Status: CANCELLED
Start: 2022-07-01

## 2022-06-03 RX ORDER — MORPHINE SULFATE 2 MG/ML
2 INJECTION, SOLUTION INTRAMUSCULAR; INTRAVENOUS
Status: COMPLETED | OUTPATIENT
Start: 2022-06-03 | End: 2022-06-03

## 2022-06-03 RX ORDER — ONDANSETRON 8 MG/1
8 TABLET, FILM COATED ORAL
Status: CANCELLED
Start: 2022-07-01

## 2022-06-03 RX ORDER — HEPARIN SODIUM,PORCINE 10 UNIT/ML
5 VIAL (ML) INTRAVENOUS
Status: CANCELLED | OUTPATIENT
Start: 2022-07-01

## 2022-06-03 RX ORDER — HEPARIN SODIUM (PORCINE) LOCK FLUSH IV SOLN 100 UNIT/ML 100 UNIT/ML
5 SOLUTION INTRAVENOUS
Status: CANCELLED | OUTPATIENT
Start: 2022-07-01

## 2022-06-03 RX ORDER — MORPHINE SULFATE 2 MG/ML
2 INJECTION, SOLUTION INTRAMUSCULAR; INTRAVENOUS
Status: CANCELLED
Start: 2022-07-01

## 2022-06-03 RX ORDER — HEPARIN SODIUM (PORCINE) LOCK FLUSH IV SOLN 100 UNIT/ML 100 UNIT/ML
5 SOLUTION INTRAVENOUS
Status: DISCONTINUED | OUTPATIENT
Start: 2022-06-03 | End: 2022-06-03 | Stop reason: HOSPADM

## 2022-06-03 RX ADMIN — MORPHINE SULFATE 2 MG: 2 INJECTION, SOLUTION INTRAMUSCULAR; INTRAVENOUS at 09:31

## 2022-06-03 RX ADMIN — DEXTROSE AND SODIUM CHLORIDE 1000 ML: 5; 450 INJECTION, SOLUTION INTRAVENOUS at 08:37

## 2022-06-03 RX ADMIN — MORPHINE SULFATE 2 MG: 2 INJECTION, SOLUTION INTRAMUSCULAR; INTRAVENOUS at 10:31

## 2022-06-03 RX ADMIN — Medication 5 ML: at 10:43

## 2022-06-03 RX ADMIN — MORPHINE SULFATE 2 MG: 2 INJECTION, SOLUTION INTRAMUSCULAR; INTRAVENOUS at 08:37

## 2022-06-03 NOTE — LETTER
6/3/2022         RE: Jennifer Cervantes  [address]  [place] [address]        Dear Colleague,    Thank you for referring your patient, Jennifer Cervantes, to the [facility]. Please see a copy of my visit note below.    Infusion Nursing Note:  Jennifer Cervantes presents today for IVF, pain meds.    Patient seen by provider today: No   present during visit today: Not Applicable.    Note: patient presents with 9/10 pain. Morphine given every 1 hour X 3 doses and patient's pain came down to 5/10. Patient states this is tolerable.  Patient declined PRN benadryl and zofran from therapy plan.     Intravenous Access:  Implanted Port.    Treatment Conditions:  Not Applicable.    Post Infusion Assessment:  Patient tolerated infusion without incident.  Blood return noted pre and post infusion.  Site patent and intact, free from redness, edema or discomfort.  No evidence of extravasations.  Access discontinued per protocol.     Discharge Plan:   Discharge instructions reviewed with: Patient.  Patient and/or family verbalized understanding of discharge instructions and all questions answered.  AVS to patient via [employer].  Patient will return PRN  Patient discharged in stable condition accompanied by: self.  Departure Mode: Ambulatory.    Administrations This Visit     dextrose 5% and 0.45% NaCl BOLUS     Admin Date  06/03/2022 Action  New Bag Dose  1,000 mL Rate  500 mL/hr Route  Intravenous Administered By  Claudia Landry, JOE          heparin 100 UNIT/ML injection 5 mL     Admin Date  06/03/2022 Action  Given Dose  5 mL Route  Intracatheter Administered By  Claudia Landry, RN          morphine (PF) injection 2 mg     Admin Date  06/03/2022 Action  Given Dose  2 mg Route  Intravenous Administered By  Claudia Landry, JOE           Admin Date  06/03/2022 Action  Given Dose  2 mg Route  Intravenous Administered By  Claudia Landry, JOE           Admin Date  06/03/2022  Action  Given Dose  2 mg Route  Intravenous Administered By  Claudia Landry, JOE Landry, JOE                        Again, thank you for allowing me to participate in the care of your patient.        Sincerely,        [facility]

## 2022-06-03 NOTE — PROGRESS NOTES
Infusion Nursing Note:  Jennifer Cervantes presents today for IVF, pain meds.    Patient seen by provider today: No   present during visit today: Not Applicable.    Note: patient presents with 9/10 pain. Morphine given every 1 hour X 3 doses and patient's pain came down to 5/10. Patient states this is tolerable.  Patient declined PRN benadryl and zofran from therapy plan.     Intravenous Access:  Implanted Port.    Treatment Conditions:  Not Applicable.    Post Infusion Assessment:  Patient tolerated infusion without incident.  Blood return noted pre and post infusion.  Site patent and intact, free from redness, edema or discomfort.  No evidence of extravasations.  Access discontinued per protocol.     Discharge Plan:   Discharge instructions reviewed with: Patient.  Patient and/or family verbalized understanding of discharge instructions and all questions answered.  AVS to patient via [employer].  Patient will return PRN  Patient discharged in stable condition accompanied by: self.  Departure Mode: Ambulatory.    Administrations This Visit     dextrose 5% and 0.45% NaCl BOLUS     Admin Date  06/03/2022 Action  New Bag Dose  1,000 mL Rate  500 mL/hr Route  Intravenous Administered By  Claudia Landry RN          heparin 100 UNIT/ML injection 5 mL     Admin Date  06/03/2022 Action  Given Dose  5 mL Route  Intracatheter Administered By  Claudia Landry RN          morphine (PF) injection 2 mg     Admin Date  06/03/2022 Action  Given Dose  2 mg Route  Intravenous Administered By  Claudia Landry RN           Admin Date  06/03/2022 Action  Given Dose  2 mg Route  Intravenous Administered By  Claudia Landry RN           Admin Date  06/03/2022 Action  Given Dose  2 mg Route  Intravenous Administered By  Claudia Landry RN Julie Backhaus, RN

## 2022-06-03 NOTE — TELEPHONE ENCOUNTER
Oncology Nurse Triage - Sickle Cell Pain Crisis:    Treating Provider:   Patricia Mantilla    Date of last office visit:  5/17/22    Did they no show to last appointment: No     Date of last infusion: 5/31/22    Onset of symptoms: 6/2/22    Duration of symptoms: 1 day    Does pain feel like patients typical sickle cell pain? Yes     Have home medications been tried:  Yes    Last home medication taken and when: oxy and MS cont last dose 6AM        Other home therapies attempted: HEAT/HEATING PAD and WARM BATH     Is patient out of home medications: No     Has patient been seen in ED or infusion in the last 3 days? (if yes, when): yes he had infusion 5/31/22    Does patient have active treatment plan?  Yes      Symptom assessment:    Fever (if yes max temperature recorded in last 24 hours):  No    Chest Pain Present (if yes when?): No       Shortness of breath: No     GI symptoms:   no history of abdominal pain, fever, vomiting, or jaundice    Urinary symptoms:   No changes in voiding. Normal frequency and characteristics.    Oral intake:   Regular diet, tolerating without issues.         Does patient have transportation: No she will need a ride           Grades for reference:       Grade 1 -    o Patient has active treatment plan.   o Patients last scheduled clinic appointment was attended  o Patient has been seen in infusion orin the last 3 days (5/31/22)   o Afebrile   o Typically sickle cell pain   o Home medications have been tried   o No other symptoms     o     Recommendations:   Pt does not  qualifies for sickle cell standing protocol order. She had infusion 5/31/22  Provider was paged for approval  Provider Patricia Mantilla approved pt to come for IVF/pain med   Advised pt if you do not hear from the clinic by 2PM to seek ED treatment if still in pain or try call again Monday morning.   Pt placed on infusion track list    [facility]IP[facility] will take pt at 8:30   Pt was called and confirmed appt, advised to arrive on scheduled  time.     Message sent to scheduling to add pt to SIPS for IVF/pain med

## 2022-06-03 NOTE — PROGRESS NOTES
Social Work Note: Telephone Call  Oncology Clinic     Data/Intervention:  Patient Name:  Jennifer Cervantes  /Age: 2000, 22 years old     Call From: [facility]        Reason for Call:  Transportation     Assessment:   called [email or web address] to arrange ride through patient's insurance. [email or web address] arranged will call ride from clinic to patients home with [employer] ([phone]).  Patient will need to call when ready for return ride home ([phone]).      Plan:  Patient is aware of the transportation plan.  available to assist with any other needs.      CARLOS Chavez,UDAY  Hematology/Oncology Social Worker  Phone:[phone] Pager: [phone]

## 2022-06-03 NOTE — PATIENT INSTRUCTIONS
Dear Jennifer Cervantes    Thank you for choosing [facility] ([facility]) for your infusion.  The following information is a summary of our appointment as well as important reminders.      We look forward in seeing you on your next appointment here at [facility] ([facility]).  Please don t hesitate to call us at [phone] to reschedule any of your appointments or to speak with one of the [facility] registered nurses.  It was a pleasure taking care of you today.    Sincerely,    [facility]  [facility]  [address] [place] [address]  [id number]  Phone:  [phone]

## 2022-06-06 ENCOUNTER — APPOINTMENT (OUTPATIENT)
Dept: LAB | Facility: CLINIC | Age: 22
End: 2022-06-06
Payer: COMMERCIAL

## 2022-06-06 ENCOUNTER — ONCOLOGY VISIT (OUTPATIENT)
Dept: ONCOLOGY | Facility: CLINIC | Age: 22
End: 2022-06-06
Payer: COMMERCIAL

## 2022-06-06 ENCOUNTER — OFFICE VISIT (OUTPATIENT)
Dept: AUDIOLOGY | Facility: CLINIC | Age: 22
End: 2022-06-06
Payer: COMMERCIAL

## 2022-06-06 ENCOUNTER — INFUSION THERAPY VISIT (OUTPATIENT)
Dept: ONCOLOGY | Facility: CLINIC | Age: 22
End: 2022-06-06
Payer: COMMERCIAL

## 2022-06-06 ENCOUNTER — PATIENT OUTREACH (OUTPATIENT)
Dept: CARE COORDINATION | Facility: CLINIC | Age: 22
End: 2022-06-06

## 2022-06-06 VITALS
WEIGHT: 169 LBS | HEART RATE: 110 BPM | TEMPERATURE: 98 F | DIASTOLIC BLOOD PRESSURE: 81 MMHG | OXYGEN SATURATION: 91 % | SYSTOLIC BLOOD PRESSURE: 138 MMHG | BODY MASS INDEX: 29.01 KG/M2 | RESPIRATION RATE: 16 BRPM

## 2022-06-06 VITALS — OXYGEN SATURATION: 95 %

## 2022-06-06 DIAGNOSIS — E83.111 HEMOCHROMATOSIS DUE TO REPEATED RED BLOOD CELL TRANSFUSIONS: ICD-10-CM

## 2022-06-06 DIAGNOSIS — H91.03 OTOTOXIC HEARING LOSS OF BOTH EARS: ICD-10-CM

## 2022-06-06 DIAGNOSIS — D57.00 SICKLE CELL PAIN CRISIS (H): ICD-10-CM

## 2022-06-06 DIAGNOSIS — E83.111 IRON OVERLOAD DUE TO REPEATED RED BLOOD CELL TRANSFUSIONS: ICD-10-CM

## 2022-06-06 DIAGNOSIS — Z51.81 ENCOUNTER FOR MONITORING OTOTOXIC DRUG THERAPY: Primary | ICD-10-CM

## 2022-06-06 DIAGNOSIS — J45.40 MODERATE PERSISTENT ASTHMA WITHOUT COMPLICATION: ICD-10-CM

## 2022-06-06 DIAGNOSIS — D57.1 HB-SS DISEASE WITHOUT CRISIS (H): Primary | ICD-10-CM

## 2022-06-06 DIAGNOSIS — Z79.899 ENCOUNTER FOR MONITORING OTOTOXIC DRUG THERAPY: Primary | ICD-10-CM

## 2022-06-06 DIAGNOSIS — I27.82 CHRONIC PULMONARY EMBOLISM WITHOUT ACUTE COR PULMONALE, UNSPECIFIED PULMONARY EMBOLISM TYPE (H): ICD-10-CM

## 2022-06-06 DIAGNOSIS — G81.10 SPASTIC HEMIPLEGIA, UNSPECIFIED ETIOLOGY, UNSPECIFIED LATERALITY (H): ICD-10-CM

## 2022-06-06 DIAGNOSIS — H90.3 ASYMMETRICAL SENSORINEURAL HEARING LOSS: ICD-10-CM

## 2022-06-06 DIAGNOSIS — H53.9 VISION CHANGES: ICD-10-CM

## 2022-06-06 LAB
ALBUMIN SERPL-MCNC: 4.3 G/DL (ref 3.4–5)
ALBUMIN UR-MCNC: NEGATIVE MG/DL
ALP SERPL-CCNC: 87 U/L (ref 40–150)
ALT SERPL W P-5'-P-CCNC: 66 U/L (ref 0–50)
ANION GAP SERPL CALCULATED.3IONS-SCNC: 11 MMOL/L (ref 3–14)
APPEARANCE UR: CLEAR
AST SERPL W P-5'-P-CCNC: 73 U/L (ref 0–45)
BASOPHILS # BLD MANUAL: 0.3 10E3/UL (ref 0–0.2)
BASOPHILS NFR BLD MANUAL: 2 %
BILIRUB SERPL-MCNC: 4.9 MG/DL (ref 0.2–1.3)
BILIRUB UR QL STRIP: NEGATIVE
BUN SERPL-MCNC: 6 MG/DL (ref 7–30)
BURR CELLS BLD QL SMEAR: SLIGHT
CALCIUM SERPL-MCNC: 9 MG/DL (ref 8.5–10.1)
CHLORIDE BLD-SCNC: 110 MMOL/L (ref 94–109)
CO2 SERPL-SCNC: 17 MMOL/L (ref 20–32)
COLOR UR AUTO: YELLOW
CREAT SERPL-MCNC: 0.5 MG/DL (ref 0.52–1.04)
EOSINOPHIL # BLD MANUAL: 0.3 10E3/UL (ref 0–0.7)
EOSINOPHIL NFR BLD MANUAL: 2 %
ERYTHROCYTE [DISTWIDTH] IN BLOOD BY AUTOMATED COUNT: 24.4 % (ref 10–15)
FERRITIN SERPL-MCNC: 7110 NG/ML (ref 12–150)
GFR SERPL CREATININE-BSD FRML MDRD: >90 ML/MIN/1.73M2
GLUCOSE BLD-MCNC: 94 MG/DL (ref 70–99)
GLUCOSE UR STRIP-MCNC: NEGATIVE MG/DL
HCT VFR BLD AUTO: 20.5 % (ref 35–47)
HGB BLD-MCNC: 7.5 G/DL (ref 11.7–15.7)
HGB UR QL STRIP: NEGATIVE
KETONES UR STRIP-MCNC: NEGATIVE MG/DL
LEUKOCYTE ESTERASE UR QL STRIP: NEGATIVE
LYMPHOCYTES # BLD MANUAL: 2.6 10E3/UL (ref 0.8–5.3)
LYMPHOCYTES NFR BLD MANUAL: 18 %
MCH RBC QN AUTO: 33.2 PG (ref 26.5–33)
MCHC RBC AUTO-ENTMCNC: 36.6 G/DL (ref 31.5–36.5)
MCV RBC AUTO: 91 FL (ref 78–100)
MONOCYTES # BLD MANUAL: 0.9 10E3/UL (ref 0–1.3)
MONOCYTES NFR BLD MANUAL: 6 %
NEUTROPHILS # BLD MANUAL: 10.2 10E3/UL (ref 1.6–8.3)
NEUTROPHILS NFR BLD MANUAL: 72 %
NITRATE UR QL: NEGATIVE
NRBC # BLD AUTO: 1.7 10E3/UL
NRBC BLD MANUAL-RTO: 12 %
PH UR STRIP: 6 [PH] (ref 5–7)
PLAT MORPH BLD: ABNORMAL
PLATELET # BLD AUTO: 342 10E3/UL (ref 150–450)
POLYCHROMASIA BLD QL SMEAR: ABNORMAL
POTASSIUM BLD-SCNC: 3.6 MMOL/L (ref 3.4–5.3)
PROT SERPL-MCNC: 8.1 G/DL (ref 6.8–8.8)
RBC # BLD AUTO: 2.26 10E6/UL (ref 3.8–5.2)
RBC MORPH BLD: ABNORMAL
RBC URINE: <1 /HPF
SICKLE CELLS BLD QL SMEAR: ABNORMAL
SODIUM SERPL-SCNC: 138 MMOL/L (ref 133–144)
SP GR UR STRIP: 1 (ref 1–1.03)
SQUAMOUS EPITHELIAL: <1 /HPF
TARGETS BLD QL SMEAR: SLIGHT
UROBILINOGEN UR STRIP-MCNC: NORMAL MG/DL
WBC # BLD AUTO: 14.2 10E3/UL (ref 4–11)
WBC URINE: <1 /HPF

## 2022-06-06 PROCEDURE — 92557 COMPREHENSIVE HEARING TEST: CPT | Performed by: AUDIOLOGIST-HEARING AID FITTER

## 2022-06-06 PROCEDURE — 92550 TYMPANOMETRY & REFLEX THRESH: CPT | Performed by: AUDIOLOGIST-HEARING AID FITTER

## 2022-06-06 PROCEDURE — G0463 HOSPITAL OUTPT CLINIC VISIT: HCPCS

## 2022-06-06 PROCEDURE — 258N000003 HC RX IP 258 OP 636: Performed by: PEDIATRICS

## 2022-06-06 PROCEDURE — 96376 TX/PRO/DX INJ SAME DRUG ADON: CPT

## 2022-06-06 PROCEDURE — 81001 URINALYSIS AUTO W/SCOPE: CPT | Performed by: PEDIATRICS

## 2022-06-06 PROCEDURE — 99215 OFFICE O/P EST HI 40 MIN: CPT | Performed by: PEDIATRICS

## 2022-06-06 PROCEDURE — 85014 HEMATOCRIT: CPT | Performed by: PEDIATRICS

## 2022-06-06 PROCEDURE — 96361 HYDRATE IV INFUSION ADD-ON: CPT

## 2022-06-06 PROCEDURE — 96374 THER/PROPH/DIAG INJ IV PUSH: CPT | Mod: 59

## 2022-06-06 PROCEDURE — 80053 COMPREHEN METABOLIC PANEL: CPT | Performed by: PEDIATRICS

## 2022-06-06 PROCEDURE — 36591 DRAW BLOOD OFF VENOUS DEVICE: CPT | Performed by: PEDIATRICS

## 2022-06-06 PROCEDURE — 250N000011 HC RX IP 250 OP 636: Performed by: PEDIATRICS

## 2022-06-06 PROCEDURE — 92588 EVOKED AUDITORY TST COMPLETE: CPT | Performed by: AUDIOLOGIST-HEARING AID FITTER

## 2022-06-06 PROCEDURE — 82728 ASSAY OF FERRITIN: CPT | Performed by: PEDIATRICS

## 2022-06-06 PROCEDURE — 85007 BL SMEAR W/DIFF WBC COUNT: CPT | Performed by: PEDIATRICS

## 2022-06-06 RX ORDER — HEPARIN SODIUM (PORCINE) LOCK FLUSH IV SOLN 100 UNIT/ML 100 UNIT/ML
5 SOLUTION INTRAVENOUS
Status: DISCONTINUED | OUTPATIENT
Start: 2022-06-06 | End: 2022-06-06 | Stop reason: HOSPADM

## 2022-06-06 RX ORDER — ONDANSETRON 8 MG/1
8 TABLET, FILM COATED ORAL
Status: CANCELLED
Start: 2022-07-01

## 2022-06-06 RX ORDER — HEPARIN SODIUM,PORCINE 10 UNIT/ML
5 VIAL (ML) INTRAVENOUS
Status: CANCELLED | OUTPATIENT
Start: 2022-07-01

## 2022-06-06 RX ORDER — DIPHENHYDRAMINE HCL 25 MG
25 CAPSULE ORAL
Status: CANCELLED
Start: 2022-07-01

## 2022-06-06 RX ORDER — MORPHINE SULFATE 2 MG/ML
2 INJECTION, SOLUTION INTRAMUSCULAR; INTRAVENOUS
Status: DISCONTINUED | OUTPATIENT
Start: 2022-06-06 | End: 2022-06-06 | Stop reason: HOSPADM

## 2022-06-06 RX ORDER — HEPARIN SODIUM (PORCINE) LOCK FLUSH IV SOLN 100 UNIT/ML 100 UNIT/ML
500 SOLUTION INTRAVENOUS ONCE
Status: COMPLETED | OUTPATIENT
Start: 2022-06-06 | End: 2022-06-06

## 2022-06-06 RX ORDER — HEPARIN SODIUM (PORCINE) LOCK FLUSH IV SOLN 100 UNIT/ML 100 UNIT/ML
5 SOLUTION INTRAVENOUS
Status: CANCELLED | OUTPATIENT
Start: 2022-07-01

## 2022-06-06 RX ORDER — MORPHINE SULFATE 2 MG/ML
2 INJECTION, SOLUTION INTRAMUSCULAR; INTRAVENOUS
Status: CANCELLED
Start: 2022-07-01

## 2022-06-06 RX ADMIN — Medication 5 ML: at 14:33

## 2022-06-06 RX ADMIN — MORPHINE SULFATE 2 MG: 2 INJECTION, SOLUTION INTRAMUSCULAR; INTRAVENOUS at 13:17

## 2022-06-06 RX ADMIN — MORPHINE SULFATE 2 MG: 2 INJECTION, SOLUTION INTRAMUSCULAR; INTRAVENOUS at 14:20

## 2022-06-06 RX ADMIN — DEXTROSE AND SODIUM CHLORIDE 1000 ML: 5; 450 INJECTION, SOLUTION INTRAVENOUS at 12:16

## 2022-06-06 RX ADMIN — MORPHINE SULFATE 2 MG: 2 INJECTION, SOLUTION INTRAMUSCULAR; INTRAVENOUS at 12:17

## 2022-06-06 RX ADMIN — Medication 500 UNITS: at 10:12

## 2022-06-06 ASSESSMENT — PAIN SCALES - GENERAL: PAINLEVEL: EXTREME PAIN (8)

## 2022-06-06 NOTE — PATIENT INSTRUCTIONS
Contact Numbers  [facility]: [phone]    After Hours:  [phone]  Triage: [phone]    Please call the [facility] Triage line if you experience a temperature greater than or equal to 100.5, shaking chills, have uncontrolled nausea, vomiting and/or diarrhea, dizziness, shortness of breath, chest pain, bleeding, unexplained bruising, or if you have any other new/concerning symptoms, questions or concerns.     If it is after hours, weekends, or holidays, please call the main hospital  at  [phone] and ask to speak to the Oncology doctor on call.     If you are having any concerning symptoms or wish to speak to a provider before your next infusion visit, please call your care coordinator or triage to notify them so we can adequately serve you.     If you need a refill on a narcotic prescription or other medication, please call triage before your infusion appointment.       June 2022 Sunday Monday Tuesday Wednesday Thursday Friday Saturday                  1     2    ADULT HEARING EVALUATION   6:45 AM   (60 min.)   Aide Mack AuD M [facility] 3    SPEC INFUSION 2 HR (120 MIN)   8:30 AM   (120 min.)   [facility]   [facility] 4       5     6    ADULT HEARING EVALUATION   9:15 AM   (60 min.)   Aide Mack AuD M [facility]    LAB PERIPHERAL  10:30 AM   (15 min.)   [facility] DRAW   [facility]    RETURN  10:45 AM   (30 min.)   Eric Duncan MD   [facility]    ONC INFUSION 2 HR (120 MIN)  11:30 AM   (120 min.)    ONC INFUSION NURSE   [facility] 7     8    ADULT HEARING EVALUATION  10:15 AM   (60 min.)   Yashira Batista AuD M [facility] 9     10     11       12     13     14     15    [facility]  PHYSICAL  10:15 AM   (30 min.)   Suraj Case MD   [facility] [place] 16     17     18       19     20     21     22     23     24     25       26     27     28     29     30                             July 2022 Sunday Monday Tuesday Wednesday Thursday Friday Saturday                            1     2       3     4     5     6     7     8     9       10     11     12     13     14     15    RETURN  10:45 AM   (60 min.)   Katherine Pierce MD   [facility] 16       17     18     19     20     21     22     23       24     25     26     27     28     29     30       31                                                     Lab Results:  Recent Results (from the past 12 hour(s))   Ferritin    Collection Time: 06/06/22 10:18 AM   Result Value Ref Range    Ferritin 7,110 (H) 12 - 150 ng/mL   Comprehensive metabolic panel    Collection Time: 06/06/22 10:18 AM   Result Value Ref Range    Sodium 138 133 - 144 mmol/L    Potassium 3.6 3.4 - 5.3 mmol/L    Chloride 110 (H) 94 - 109 mmol/L    Carbon Dioxide (CO2) 17 (L) 20 - 32 mmol/L    Anion Gap 11 3 - 14 mmol/L    Urea Nitrogen 6 (L) 7 - 30 mg/dL    Creatinine 0.50 (L) 0.52 - 1.04 mg/dL    Calcium 9.0 8.5 - 10.1 mg/dL    Glucose 94 70 - 99 mg/dL    Alkaline Phosphatase 87 40 - 150 U/L    AST 73 (H) 0 - 45 U/L    ALT 66 (H) 0 - 50 U/L    Protein Total 8.1 6.8 - 8.8 g/dL    Albumin 4.3 3.4 - 5.0 g/dL    Bilirubin Total 4.9 (H) 0.2 - 1.3 mg/dL    GFR Estimate >90 >60 mL/min/1.73m2   CBC with platelets and differential    Collection Time: 06/06/22 10:18 AM   Result Value Ref Range    WBC Count 14.2 (H) 4.0 - 11.0 10e3/uL    RBC Count 2.26 (L) 3.80 - 5.20 10e6/uL    Hemoglobin 7.5 (L) 11.7 - 15.7 g/dL    Hematocrit 20.5 (L) 35.0 - 47.0 %    MCV 91 78 - 100 fL    MCH 33.2 (H) 26.5 - 33.0 pg    MCHC 36.6 (H) 31.5 - 36.5 g/dL    RDW 24.4 (H) 10.0 - 15.0 %    Platelet Count 342 150 - 450 10e3/uL    Manual Differential    Collection Time: 06/06/22 10:18 AM   Result Value Ref Range    % Neutrophils 72 %    % Lymphocytes 18 %    % Monocytes 6 %    % Eosinophils 2 %    % Basophils 2 %    NRBCs per 100 WBC 12 (H) <=0 %    Absolute Neutrophils 10.2 (H) 1.6 - 8.3 10e3/uL    Absolute Lymphocytes 2.6 0.8 - 5.3 10e3/uL    Absolute Monocytes 0.9 0.0 - 1.3 10e3/uL    Absolute Eosinophils 0.3 0.0 - 0.7 10e3/uL    Absolute Basophils 0.3 (H) 0.0 - 0.2 10e3/uL    Absolute NRBCs 1.7 (H) <=0.0 10e3/uL    RBC Morphology Confirmed RBC Indices     Platelet Assessment  Automated Count Confirmed. Platelet morphology is normal.     Automated Count Confirmed. Platelet morphology is normal.    Juvencio Cells Slight (A) None Seen    Polychromasia Moderate (A) None Seen    Sickle Cells Moderate (A) None Seen    Target Cells Slight (A) None Seen

## 2022-06-06 NOTE — Clinical Note
Hearing monitoring today.  While there is no significant change in grade on the CTCAE4.03 Scale, there were hearing changes bilaterally for both standard and extended high frequency audiometry.  Patient will follow up with you regarding these changes, to determine how frequently the hearing should be retested for monitoring and to determine if an ENT consult is advised due to the asymmetry in extended high frequency testing.  Please let me know if questions.   Dave Saeed, CCC-A, [facility] Licensed Audiologist [place] #[id number]

## 2022-06-06 NOTE — PROGRESS NOTES
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 She had multiple thromboembolic events in 2021 despite adherent anticoagulation use (though warfarin was perpetually low) and there are concerns for chronic thromboembolic disease but did not have pulmonary HTN on a November 2021 cath. She is maintained on chronic PO opioids and twice-weekly infusion visits (since 1/24/22) but has been able to be maintained on this regimen and has stayed out of the ED   on.    Interval History:  Jennifer is in clinic today with her mom. She has done well with the twice weekly infusions and will go over after our visit today. She wants to stay out of the ED due to her perception of bias against her there and has struggled with some increased pain just with home management. However, she does not think the MS Contin is working and wants to discontinue it. She otherwise is feeling well and had her hearing test today but has not yet done her vision. She remains off chelation and has not had further vision disturbances but isn't sure whether she just got used to it along with using glasses or whether it is better. No recent fevers or med changes. She has been doing well refilling her own meds.      ---------------------------------------  Jennifer Cervantes's Goals (not discussed today, last discussed winter 2022 )    1-3 month goal:  Work on finding a job    6 month goal:  Work on driving    12 month goal:      Disease-specific goal(s):  Continue to stay out of the  hospital  ---------------------------------------      Current Outpatient Medications   Medication Sig Dispense Refill     acetaminophen (TYLENOL) 325 MG tablet Take 2 tablets (650 mg) by mouth every 6 hours as needed for mild pain 120 tablet 3     albuterol (PROAIR HFA/PROVENTIL HFA/VENTOLIN HFA) 108 (90 Base) MCG/ACT inhaler Inhale 2 puffs into the lungs every 6 hours as needed for shortness of breath / dyspnea or wheezing 8.5 g 3     albuterol (PROVENTIL) (2.5 MG/3ML) 0.083% neb solution Take 1 vial (2.5 mg) by nebulization every 6 hours as needed for shortness of breath / dyspnea or wheezing 12 mL 4     aspirin (ASA) 81 MG chewable tablet Take 1 tablet (81 mg) by mouth 2 times daily 60 tablet 11     budesonide-formoterol (SYMBICORT) 160-4.5 MCG/ACT Inhaler Inhale 2 puffs into the lungs 2 times daily 10.2 g 3     diphenhydrAMINE (BENADRYL) 25 MG capsule Take 1-2 capsules (25-50 mg) by mouth nightly as needed for sleep 60 capsule 3     EPINEPHrine (ANY BX GENERIC EQUIV) 0.3 MG/0.3ML injection 2-pack Inject 0.3 mLs (0.3 mg) into the muscle as needed for anaphylaxis 1 each 1     Hydroxyurea 1000 MG TABS Take 3,000 mg by mouth daily 90 tablet 3     ondansetron (ZOFRAN) 8 MG tablet Take 1 tablet (8 mg) by mouth every 8 hours as needed 30 tablet 1     oxyCODONE IR (ROXICODONE) 15 MG tablet Take 1 tablet (15 mg) by mouth every 4 hours as needed for severe pain Goal 4 per day. Max 6 per day. 45 tablet 0       Past Medical History  Past Medical History:   Diagnosis Date     Anxiety      Bleeding disorder (H)      Blood clotting disorder (H)      Cerebral infarction (H) 2015     Cognitive developmental delay     low IQ. Please recognize when managing pain and planning with her     Depressive disorder      Hemiplegia and hemiparesis following cerebral infarction affecting right dominant side (H)     right hand contractures     Iron overload due to repeated red blood cell transfusions      Migraines      Multiple  subsegmental pulmonary emboli without acute cor pulmonale (H) 02/01/2021     Oppositional defiant behavior      Superficial venous thrombosis of arm, right 03/25/2021     Uncomplicated asthma      Past Surgical History:   Procedure Laterality Date     AS INSERT TUNNELED CV 2 CATH W/O PORT/PUMP       CHOLECYSTECTOMY       CV RIGHT HEART CATH MEASUREMENTS RECORDED N/A 11/18/2021    Procedure: Right Heart Cath;  Surgeon: Jackson Stauffer MD;  Location:  HEART CARDIAC CATH LAB     INSERT PORT VASCULAR ACCESS Left 4/21/2021    Procedure: INSERTION, VASCULAR ACCESS PORT (NOT SURE ON SIDE UNTIL REMOVAL);  Surgeon: Rajan More MD;  Location: [facility] OR     IR CHEST PORT PLACEMENT > 5 YRS OF AGE  4/21/2021     IR CVC NON TUNNEL LINE REMOVAL  5/6/2021     IR CVC NON TUNNEL PLACEMENT  04/07/2020     IR CVC NON TUNNEL PLACEMENT  4/30/2021     IR PORT REMOVAL LEFT  4/21/2021     REMOVE PORT VASCULAR ACCESS Left 4/21/2021    Procedure: REMOVAL, VASCULAR ACCESS PORT LEFT;  Surgeon: Rajan More MD;  Location: [facility] OR     REPAIR TENDON ELBOW Right 10/02/2019    Procedure: Right Elbow Flexor Lengthening, Flexor Pronator Slide Of Wrist and Finger, Thumb Adductor Lengthening;  Surgeon: Anai Franco MD;  Location: [facility] OR     TONSILLECTOMY Bilateral 10/02/2019    Procedure: Bilateral Tonsillectomy;  Surgeon: Farhana Guy MD;  Location: [facility] OR     ZZC BREAST REDUCTION (INCLUDES LIPO) TIER 3 Bilateral 04/23/2019     Allergies   Allergen Reactions     Contrast Dye      Hives and breathing issues     Fish-Derived Products Hives     Seafood Hives     Diagnostic X-Ray Materials      Gadolinium      Social History   Social History     Tobacco Use     Smoking status: Never Smoker     Smokeless tobacco: Never Used   Substance Use Topics     Alcohol use: Not Currently     Alcohol/week: 0.0 standard drinks     Drug use: Never      Past medical history and social history were reviewed.    Physical Examination:  BP  138/81 (BP Location: Right arm)   Pulse 110   Temp 98  F (36.7  C) (Oral)   Resp 16   Wt 76.7 kg (169 lb)   SpO2 91%   BMI 29.01 kg/m    Wt Readings from Last 10 Encounters:   06/06/22 76.7 kg (169 lb)   05/27/22 77.2 kg (170 lb 3.2 oz)   05/17/22 77.9 kg (171 lb 11.8 oz)   04/15/22 77 kg (169 lb 12.8 oz)   03/30/22 77.1 kg (170 lb)   03/21/22 77.1 kg (169 lb 14.4 oz)   03/20/22 77.1 kg (170 lb)   03/11/22 76.6 kg (168 lb 12.8 oz)   03/06/22 77.6 kg (171 lb)   02/21/22 77.6 kg (171 lb 1.6 oz)     General: Reasonably well-appearing today consistent with her baseline. Smiling and joking around today  Eyes: EOMI, PERRL. No scleral icterus.  Skin: no bruising noted on exposed skin. Port site c/d/i, not yet accessed  Respiratory:  Normal respiratory effort. Lungs clear to auscultation.  Cardiac: Tachycardic rate, normal rhythm. No murmur.  Neurologic: Cranial nerves II through XII are grossly intact. Contractured right hand 2/2 stroke history    Laboratory Data:   Latest Reference Range & Units 06/06/22 10:18 06/06/22 14:24   Sodium 133 - 144 mmol/L 138    Potassium 3.4 - 5.3 mmol/L 3.6    Chloride 94 - 109 mmol/L 110 (H)    Carbon Dioxide 20 - 32 mmol/L 17 (L)    Urea Nitrogen 7 - 30 mg/dL 6 (L)    Creatinine 0.52 - 1.04 mg/dL 0.50 (L)    GFR Estimate >60 mL/min/1.73m2 >90 [1]    Calcium 8.5 - 10.1 mg/dL 9.0    Anion Gap 3 - 14 mmol/L 11    Albumin 3.4 - 5.0 g/dL 4.3    Protein Total 6.8 - 8.8 g/dL 8.1    Bilirubin Total 0.2 - 1.3 mg/dL 4.9 (H)    Alkaline Phosphatase 40 - 150 U/L 87    ALT 0 - 50 U/L 66 (H)    AST 0 - 45 U/L 73 (H)    Ferritin 12 - 150 ng/mL 7,110 (H)    Glucose 70 - 99 mg/dL 94    WBC 4.0 - 11.0 10e3/uL 14.2 (H)    Hemoglobin 11.7 - 15.7 g/dL 7.5 (L)    Hematocrit 35.0 - 47.0 % 20.5 (L)    Platelet Count 150 - 450 10e3/uL 342    RBC Count 3.80 - 5.20 10e6/uL 2.26 (L)    MCV 78 - 100 fL 91    MCH 26.5 - 33.0 pg 33.2 (H)    MCHC 31.5 - 36.5 g/dL 36.6 (H)    RDW 10.0 - 15.0 % 24.4 (H)    %  Neutrophils % 72    % Lymphocytes % 18    % Monocytes % 6    % Eosinophils % 2    % Basophils % 2    Absolute Basophils 0.0 - 0.2 10e3/uL 0.3 (H)    NRBC/W <=0 % 12 (H)    Absolute Neutrophil 1.6 - 8.3 10e3/uL 10.2 (H)    Absolute Lymphocytes 0.8 - 5.3 10e3/uL 2.6    Absolute Monocytes 0.0 - 1.3 10e3/uL 0.9    Absolute Eosinophils 0.0 - 0.7 10e3/uL 0.3    Absolute NRBCs <=0.0 10e3/uL 1.7 (H)    RBC Morphology  Confirmed RBC Indices    Platelet Morphology Automated Count Confirmed. Platelet morphology is normal.  Automated Count Confirmed. Platelet morphology is normal.    Polychromasia None Seen  Moderate !    Sickle Cells None Seen  Moderate !    Target Cells None Seen  Slight !    [place] Cells None Seen  Slight !    Color Urine Colorless, Straw, Light Yellow, Yellow   Yellow   Appearance Urine Clear   Clear   Glucose Urine Negative mg/dL  Negative   Bilirubin Urine Negative   Negative   Ketones Urine Negative mg/dL  Negative   Specific Gravity Urine 1.003 - 1.035   1.004   PH Urine 5.0 - 7.0   6.0   Protein Albumin Urine Negative mg/dL  Negative   Urobilinogen mg/dL Normal, 2.0 mg/dL  Normal   Nitrite Urine Negative   Negative   Blood Urine Negative   Negative   Leukocyte Esterase Urine Negative   Negative   WBC Urine <=5 /HPF  <1   RBC Urine <=2 /HPF  <1   Squamous Epithelial /HPF Urine <=1 /HPF  <1       Most recent labs reviewed and interpreted by me today.    Audiology visit summary   Hearing monitoring done 6/7.  While there is no significant change in grade on the CTCAE4.03 Scale, there were hearing changes bilaterally for both standard and extended high frequency audiometry.  Will need to determine if an ENT consult is advised due to the asymmetry in extended high frequency testing.       Assessment and Plan:  1. Sickle Cell HgbSS Disease  2. Chronic Pain  3. Iron overload  4. Port discomfort   5. 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 History of CVA  7. Hearing  loss    Overall Jennifer has been doing well with pain management despite her reports of increased pain at home. We have been able to cut down significantly on ED visits, with her last one being 3/30/22 and her last admission being 1/29/22! We discussed Suboxone today but she was not interested, both because she was put off by the nomenclature for the pain specialist referral and because she is fine with the current course, other than stopping MS Contin.    She has been off of the warfarin now and back on her aspirin, which is dosed twice a day and she remains adherent. Chelation is on hold due to vision changes but I am waiting on her vision testing first.  She has not had any issues with that transition.  She continues on her Hydrea.     Plan:  -Continue Hydrea to 3000mg daily to help lessen frequency of sickle cell pain  -Continue Oxycodone at home. MS Contin stopped today. Offered that she could take 1 if needed in the coming days after stopping it but if she can avoid going back to BID, that would hlep.  -Infusion center visits limited to two times per week. Continue diligent home management with current medications, heat, rest, compression, warm baths.   -If unable to manage at home can go to ED but continue to not do IV narcotics in the emergency room. Ketamine previously added to pain plan in ED  -Continue holding Desferal and Jadenu for iron overload. Reordered vision testing  -Will talk to her about ENT for her hearing test results  -Continue aspirin BID.   -RTC in ~4 weeks and will plan to review sickle cell health maintenance at that time.    50 minutes spent on the date of the encounter doing chart review, review of test results, patient visit and documentation         Eric Duncan MD  Hematologist  Division of Hematology, Oncology, and Transplantation  [facility]  [employer] [place]  Pager: [phone]

## 2022-06-06 NOTE — NURSING NOTE
"Chief Complaint   Patient presents with     Port Draw     Labs drawn via port by RN. Vitals taken.     Port accessed with 20G 3/4\" flat needle by RN, labs collected, line flushed with saline and heparin.  Vitals taken. Pt checked in for appointment(s).    Joy De Santiago RN    "

## 2022-06-06 NOTE — NURSING NOTE
"Oncology Rooming Note    June 6, 2022 10:42 AM   Jennifer Cervantes is a 22 year old female who presents for:    Chief Complaint   Patient presents with     Port Draw     Labs drawn via port by RN. Vitals taken.     Oncology Clinic Visit     Rtn for sickle cell anemia     Initial Vitals: /81 (BP Location: Right arm)   Pulse 110   Temp 98  F (36.7  C) (Oral)   Resp 16   Wt 76.7 kg (169 lb)   SpO2 91%   BMI 29.01 kg/m   Estimated body mass index is 29.01 kg/m  as calculated from the following:    Height as of 3/20/22: 1.626 m (5' 4\").    Weight as of this encounter: 76.7 kg (169 lb). Body surface area is 1.86 meters squared.  Extreme Pain (8) Comment: Data Unavailable   No LMP recorded. Patient has had an injection.  Allergies reviewed: Yes  Medications reviewed: Yes    Medications: Medication refills not needed today.  Pharmacy name entered into "[employer]: [facility] [place], [place] - [address] [place] [address] [phone]    Clinical concerns: none       Yadi Welch, EMT            "

## 2022-06-06 NOTE — PROGRESS NOTES
Infusion Nursing Note:  Jennifer Cervantes presents today for Pain medication/IVF.    Patient seen by provider today: Yes: David Duncan MD   present during visit today: Not Applicable.    Note: Patient arrived at infusion with PS 8/10. Denies fever/chills No new concern made. Otherwise well.    Upon discharge patient had 3 dose of Morphine with PS 5/10. Patient agreed to go home. Instructed patient if symptoms persists/worsen to call triage. Verbalized understanding.       Intravenous Access:  Implanted Port.    Treatment Conditions:  Not Applicable.      Post Infusion Assessment:  Patient tolerated infusion without incident.  Blood return noted pre and post infusion.  Site patent and intact, free from redness, edema or discomfort.  No evidence of extravasations.  Access discontinued per protocol.       Discharge Plan:   Patient declined prescription refills.  Discharge instructions reviewed with: Patient.  Patient and/or family verbalized understanding of discharge instructions and all questions answered.  Copy of AVS reviewed with patient and/or family.  Patient will call to return for next appointment.  Patient discharged in stable condition accompanied by: self.  Departure Mode: Ambulatory.      GLORIA YANCEY RN

## 2022-06-06 NOTE — LETTER
6/6/2022         RE: Jennifer Cervantes  [address]  [facility] [address]        Dear Colleague,    Thank you for referring your patient, Jennifer Cervantes, to the [facility]. Please see a copy of my visit note below.        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 She had multiple thromboembolic events in 2021 despite adherent anticoagulation use (though warfarin was perpetually low) and there are concerns for chronic thromboembolic disease but did not have pulmonary HTN on a November 2021 cath. She is maintained on chronic PO opioids and twice-weekly infusion visits (since 1/24/22) but has been able to be maintained on this regimen and has stayed out of the ED   on.    Interval History:  Jennifer is in clinic today with her mom. She has done well with the twice weekly infusions and will go over after our visit today. She wants to stay out of the ED due to her perception of bias against her there and has struggled with some increased pain just with home management. However, she does not think the MS Contin is working and wants to discontinue it. She otherwise is feeling well and had her hearing test today but has not yet done her vision. She remains off chelation and has not had further vision disturbances but isn't sure whether she just got used to it along with using glasses or whether it is better. No recent fevers or med changes. She has been doing well refilling her own  meds.      ---------------------------------------  Jennifer Cervantes's Goals (not discussed today, last discussed winter 2022 )    1-3 month goal:  Work on finding a job    6 month goal:  Work on driving    12 month goal:      Disease-specific goal(s):  Continue to stay out of the hospital  ---------------------------------------      Current Outpatient Medications   Medication Sig Dispense Refill     acetaminophen (TYLENOL) 325 MG tablet Take 2 tablets (650 mg) by mouth every 6 hours as needed for mild pain 120 tablet 3     albuterol (PROAIR HFA/PROVENTIL HFA/VENTOLIN HFA) 108 (90 Base) MCG/ACT inhaler Inhale 2 puffs into the lungs every 6 hours as needed for shortness of breath / dyspnea or wheezing 8.5 g 3     albuterol (PROVENTIL) (2.5 MG/3ML) 0.083% neb solution Take 1 vial (2.5 mg) by nebulization every 6 hours as needed for shortness of breath / dyspnea or wheezing 12 mL 4     aspirin (ASA) 81 MG chewable tablet Take 1 tablet (81 mg) by mouth 2 times daily 60 tablet 11     budesonide-formoterol (SYMBICORT) 160-4.5 MCG/ACT Inhaler Inhale 2 puffs into the lungs 2 times daily 10.2 g 3     diphenhydrAMINE (BENADRYL) 25 MG capsule Take 1-2 capsules (25-50 mg) by mouth nightly as needed for sleep 60 capsule 3     EPINEPHrine (ANY BX GENERIC EQUIV) 0.3 MG/0.3ML injection 2-pack Inject 0.3 mLs (0.3 mg) into the muscle as needed for anaphylaxis 1 each 1     Hydroxyurea 1000 MG TABS Take 3,000 mg by mouth daily 90 tablet 3     ondansetron (ZOFRAN) 8 MG tablet Take 1 tablet (8 mg) by mouth every 8 hours as needed 30 tablet 1     oxyCODONE IR (ROXICODONE) 15 MG tablet Take 1 tablet (15 mg) by mouth every 4 hours as needed for severe pain Goal 4 per day. Max 6 per day. 45 tablet 0       Past Medical History  Past Medical History:   Diagnosis Date     Anxiety      Bleeding disorder (H)      Blood clotting disorder (H)      Cerebral infarction (H) 2015     Cognitive developmental delay     low IQ. Please recognize when  managing pain and planning with her     Depressive disorder      Hemiplegia and hemiparesis following cerebral infarction affecting right dominant side (H)     right hand contractures     Iron overload due to repeated red blood cell transfusions      Migraines      Multiple subsegmental pulmonary emboli without acute cor pulmonale (H) 02/01/2021     Oppositional defiant behavior      Superficial venous thrombosis of arm, right 03/25/2021     Uncomplicated asthma      Past Surgical History:   Procedure Laterality Date     AS INSERT TUNNELED CV 2 CATH W/O PORT/PUMP       CHOLECYSTECTOMY       CV RIGHT HEART CATH MEASUREMENTS RECORDED N/A 11/18/2021    Procedure: Right Heart Cath;  Surgeon: Jackson Stauffer MD;  Location:  HEART CARDIAC CATH LAB     INSERT PORT VASCULAR ACCESS Left 4/21/2021    Procedure: INSERTION, VASCULAR ACCESS PORT (NOT SURE ON SIDE UNTIL REMOVAL);  Surgeon: Rajan More MD;  Location: [facility] OR     IR CHEST PORT PLACEMENT > 5 YRS OF AGE  4/21/2021     IR CVC NON TUNNEL LINE REMOVAL  5/6/2021     IR CVC NON TUNNEL PLACEMENT  04/07/2020     IR CVC NON TUNNEL PLACEMENT  4/30/2021     IR PORT REMOVAL LEFT  4/21/2021     REMOVE PORT VASCULAR ACCESS Left 4/21/2021    Procedure: REMOVAL, VASCULAR ACCESS PORT LEFT;  Surgeon: Rajan More MD;  Location: [facility] OR     REPAIR TENDON ELBOW Right 10/02/2019    Procedure: Right Elbow Flexor Lengthening, Flexor Pronator Slide Of Wrist and Finger, Thumb Adductor Lengthening;  Surgeon: Anai Franco MD;  Location: [facility]     TONSILLECTOMY Bilateral 10/02/2019    Procedure: Bilateral Tonsillectomy;  Surgeon: Farhana Guy MD;  Location: [facility] OR     ZZC BREAST REDUCTION (INCLUDES LIPO) TIER 3 Bilateral 04/23/2019     Allergies   Allergen Reactions     Contrast Dye      Hives and breathing issues     Fish-Derived Products Hives     Seafood Hives     Diagnostic X-Ray Materials      Gadolinium      Social History   Social History     Tobacco  Use     Smoking status: Never Smoker     Smokeless tobacco: Never Used   Substance Use Topics     Alcohol use: Not Currently     Alcohol/week: 0.0 standard drinks     Drug use: Never      Past medical history and social history were reviewed.    Physical Examination:  /81 (BP Location: Right arm)   Pulse 110   Temp 98  F (36.7  C) (Oral)   Resp 16   Wt 76.7 kg (169 lb)   SpO2 91%   BMI 29.01 kg/m    Wt Readings from Last 10 Encounters:   06/06/22 76.7 kg (169 lb)   05/27/22 77.2 kg (170 lb 3.2 oz)   05/17/22 77.9 kg (171 lb 11.8 oz)   04/15/22 77 kg (169 lb 12.8 oz)   03/30/22 77.1 kg (170 lb)   03/21/22 77.1 kg (169 lb 14.4 oz)   03/20/22 77.1 kg (170 lb)   03/11/22 76.6 kg (168 lb 12.8 oz)   03/06/22 77.6 kg (171 lb)   02/21/22 77.6 kg (171 lb 1.6 oz)     General: Reasonably well-appearing today consistent with her baseline. Smiling and joking around today  Eyes: EOMI, PERRL. No scleral icterus.  Skin: no bruising noted on exposed skin. Port site c/d/i, not yet accessed  Respiratory:  Normal respiratory effort. Lungs clear to auscultation.  Cardiac: Tachycardic rate, normal rhythm. No murmur.  Neurologic: Cranial nerves II through XII are grossly intact. Contractured right hand 2/2 stroke history    Laboratory Data:   Latest Reference Range & Units 06/06/22 10:18 06/06/22 14:24   Sodium 133 - 144 mmol/L 138    Potassium 3.4 - 5.3 mmol/L 3.6    Chloride 94 - 109 mmol/L 110 (H)    Carbon Dioxide 20 - 32 mmol/L 17 (L)    Urea Nitrogen 7 - 30 mg/dL 6 (L)    Creatinine 0.52 - 1.04 mg/dL 0.50 (L)    GFR Estimate >60 mL/min/1.73m2 >90 [1]    Calcium 8.5 - 10.1 mg/dL 9.0    Anion Gap 3 - 14 mmol/L 11    Albumin 3.4 - 5.0 g/dL 4.3    Protein Total 6.8 - 8.8 g/dL 8.1    Bilirubin Total 0.2 - 1.3 mg/dL 4.9 (H)    Alkaline Phosphatase 40 - 150 U/L 87    ALT 0 - 50 U/L 66 (H)    AST 0 - 45 U/L 73 (H)    Ferritin 12 - 150 ng/mL 7,110 (H)    Glucose 70 - 99 mg/dL 94    WBC 4.0 - 11.0 10e3/uL 14.2 (H)    Hemoglobin  11.7 - 15.7 g/dL 7.5 (L)    Hematocrit 35.0 - 47.0 % 20.5 (L)    Platelet Count 150 - 450 10e3/uL 342    RBC Count 3.80 - 5.20 10e6/uL 2.26 (L)    MCV 78 - 100 fL 91    MCH 26.5 - 33.0 pg 33.2 (H)    MCHC 31.5 - 36.5 g/dL 36.6 (H)    RDW 10.0 - 15.0 % 24.4 (H)    % Neutrophils % 72    % Lymphocytes % 18    % Monocytes % 6    % Eosinophils % 2    % Basophils % 2    Absolute Basophils 0.0 - 0.2 10e3/uL 0.3 (H)    NRBC/W <=0 % 12 (H)    Absolute Neutrophil 1.6 - 8.3 10e3/uL 10.2 (H)    Absolute Lymphocytes 0.8 - 5.3 10e3/uL 2.6    Absolute Monocytes 0.0 - 1.3 10e3/uL 0.9    Absolute Eosinophils 0.0 - 0.7 10e3/uL 0.3    Absolute NRBCs <=0.0 10e3/uL 1.7 (H)    RBC Morphology  Confirmed RBC Indices    Platelet Morphology Automated Count Confirmed. Platelet morphology is normal.  Automated Count Confirmed. Platelet morphology is normal.    Polychromasia None Seen  Moderate !    Sickle Cells None Seen  Moderate !    Target Cells None Seen  Slight !    [place] Cells None Seen  Slight !    Color Urine Colorless, Straw, Light Yellow, Yellow   Yellow   Appearance Urine Clear   Clear   Glucose Urine Negative mg/dL  Negative   Bilirubin Urine Negative   Negative   Ketones Urine Negative mg/dL  Negative   Specific Gravity Urine 1.003 - 1.035   1.004   PH Urine 5.0 - 7.0   6.0   Protein Albumin Urine Negative mg/dL  Negative   Urobilinogen mg/dL Normal, 2.0 mg/dL  Normal   Nitrite Urine Negative   Negative   Blood Urine Negative   Negative   Leukocyte Esterase Urine Negative   Negative   WBC Urine <=5 /HPF  <1   RBC Urine <=2 /HPF  <1   Squamous Epithelial /HPF Urine <=1 /HPF  <1     Most recent labs reviewed and interpreted by me today.    Audiology visit summary   Hearing monitoring done 6/7.  While there is no significant change in grade on the CTCAE4.03 Scale, there were hearing changes bilaterally for both standard and extended high frequency audiometry.  Will need to determine if an ENT consult is advised due to the asymmetry in  extended high frequency testing.     Assessment and Plan:  1. Sickle Cell HgbSS Disease  2. Chronic Pain  3. Iron overload  4. Port discomfort   5. 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 History of CVA  7. Hearing loss    Overall Jennifer has been doing well with pain management despite her reports of increased pain at home. We have been able to cut down significantly on ED visits, with her last one being 3/30/22 and her last admission being 1/29/22! We discussed Suboxone today but she was not interested, both because she was put off by the nomenclature for the pain specialist referral and because she is fine with the current course, other than stopping MS Contin.    She has been off of the warfarin now and back on her aspirin, which is dosed twice a day and she remains adherent. Chelation is on hold due to vision changes but I am waiting on her vision testing first.  She has not had any issues with that transition.  She continues on her Hydrea.     Plan:  -Continue Hydrea to 3000mg daily to help lessen frequency of sickle cell pain  -Continue Oxycodone at home. MS Contin stopped today. Offered that she could take 1 if needed in the coming days after stopping it but if she can avoid going back to BID, that would hlep.  -Infusion center visits limited to two times per week. Continue diligent home management with current medications, heat, rest, compression, warm baths.   -If unable to manage at home can go to ED but continue to not do IV narcotics in the emergency room. Ketamine previously added to pain plan in ED  -Continue holding Desferal and Jadenu for iron overload. Reordered vision testing  -Will talk to her about ENT for her hearing test results  -Continue aspirin BID.   -RTC in ~4 weeks and will plan to review sickle cell health maintenance at that time.    50 minutes spent on the date of the encounter doing chart review, review of test results, patient visit and  documentation       Again, thank you for allowing me to participate in the care of your patient.      Sincerely,    Eric Duncan MD

## 2022-06-09 DIAGNOSIS — D57.00 SICKLE CELL PAIN CRISIS (H): ICD-10-CM

## 2022-06-09 RX ORDER — OXYCODONE HYDROCHLORIDE 15 MG/1
15 TABLET ORAL EVERY 4 HOURS PRN
Qty: 45 TABLET | Refills: 0 | Status: SHIPPED | OUTPATIENT
Start: 2022-06-09 | End: 2022-06-16

## 2022-06-09 NOTE — TELEPHONE ENCOUNTER
Narcotic Refill Request    Medication(s) requested:  Oxycodone IR 15 mg   Person Requesting Refill: Jennifer Cervantes   What pain is the medication treating:  Sickle cell   How is the medication being taken?:1 tablet q4h PRN   Does pt have enough for today? Yes   Is pain being adequately controlled on the current regimen?:  Yes   Experiencing any side effects from medication?:  Denies SE    Date of most recent appointment:  6/6/22 Dr. Duncan   Any No Show Visits: Not recently   Next appointment:    7/15/22  Last fill date and by whom:   Patricia Mantilla CNP 6/2/22   Reviewed: No access     Routed/Paged provider:  Dr. Duncan

## 2022-06-10 ENCOUNTER — PATIENT OUTREACH (OUTPATIENT)
Dept: CARE COORDINATION | Facility: CLINIC | Age: 22
End: 2022-06-10
Payer: COMMERCIAL

## 2022-06-10 ENCOUNTER — INFUSION THERAPY VISIT (OUTPATIENT)
Dept: TRANSPLANT | Facility: CLINIC | Age: 22
End: 2022-06-10
Payer: COMMERCIAL

## 2022-06-10 ENCOUNTER — TELEPHONE (OUTPATIENT)
Dept: ONCOLOGY | Facility: CLINIC | Age: 22
End: 2022-06-10
Payer: COMMERCIAL

## 2022-06-10 VITALS
HEART RATE: 101 BPM | DIASTOLIC BLOOD PRESSURE: 85 MMHG | RESPIRATION RATE: 18 BRPM | SYSTOLIC BLOOD PRESSURE: 138 MMHG | OXYGEN SATURATION: 92 % | TEMPERATURE: 99.3 F

## 2022-06-10 DIAGNOSIS — D57.00 SICKLE CELL PAIN CRISIS (H): ICD-10-CM

## 2022-06-10 DIAGNOSIS — G81.10 SPASTIC HEMIPLEGIA, UNSPECIFIED ETIOLOGY, UNSPECIFIED LATERALITY (H): Primary | ICD-10-CM

## 2022-06-10 PROCEDURE — 96376 TX/PRO/DX INJ SAME DRUG ADON: CPT

## 2022-06-10 PROCEDURE — 96374 THER/PROPH/DIAG INJ IV PUSH: CPT

## 2022-06-10 PROCEDURE — 258N000003 HC RX IP 258 OP 636: Performed by: PEDIATRICS

## 2022-06-10 PROCEDURE — 96361 HYDRATE IV INFUSION ADD-ON: CPT

## 2022-06-10 PROCEDURE — 250N000011 HC RX IP 250 OP 636: Performed by: PEDIATRICS

## 2022-06-10 RX ORDER — MORPHINE SULFATE 2 MG/ML
2 INJECTION, SOLUTION INTRAMUSCULAR; INTRAVENOUS
Status: DISCONTINUED | OUTPATIENT
Start: 2022-06-10 | End: 2022-06-10 | Stop reason: HOSPADM

## 2022-06-10 RX ORDER — MORPHINE SULFATE 2 MG/ML
2 INJECTION, SOLUTION INTRAMUSCULAR; INTRAVENOUS
Status: CANCELLED
Start: 2022-07-01

## 2022-06-10 RX ORDER — HEPARIN SODIUM (PORCINE) LOCK FLUSH IV SOLN 100 UNIT/ML 100 UNIT/ML
5 SOLUTION INTRAVENOUS
Status: CANCELLED | OUTPATIENT
Start: 2022-07-01

## 2022-06-10 RX ORDER — HEPARIN SODIUM,PORCINE 10 UNIT/ML
5 VIAL (ML) INTRAVENOUS
Status: CANCELLED | OUTPATIENT
Start: 2022-07-01

## 2022-06-10 RX ORDER — HEPARIN SODIUM (PORCINE) LOCK FLUSH IV SOLN 100 UNIT/ML 100 UNIT/ML
5 SOLUTION INTRAVENOUS
Status: DISCONTINUED | OUTPATIENT
Start: 2022-06-10 | End: 2022-06-10 | Stop reason: HOSPADM

## 2022-06-10 RX ORDER — DIPHENHYDRAMINE HCL 25 MG
25 CAPSULE ORAL
Status: CANCELLED
Start: 2022-07-01

## 2022-06-10 RX ORDER — ONDANSETRON 8 MG/1
8 TABLET, FILM COATED ORAL
Status: CANCELLED
Start: 2022-07-01

## 2022-06-10 RX ADMIN — DEXTROSE AND SODIUM CHLORIDE 1000 ML: 5; 450 INJECTION, SOLUTION INTRAVENOUS at 13:49

## 2022-06-10 RX ADMIN — MORPHINE SULFATE 2 MG: 2 INJECTION, SOLUTION INTRAMUSCULAR; INTRAVENOUS at 15:52

## 2022-06-10 RX ADMIN — MORPHINE SULFATE 2 MG: 2 INJECTION, SOLUTION INTRAMUSCULAR; INTRAVENOUS at 14:58

## 2022-06-10 RX ADMIN — Medication 5 ML: at 15:59

## 2022-06-10 RX ADMIN — MORPHINE SULFATE 2 MG: 2 INJECTION, SOLUTION INTRAMUSCULAR; INTRAVENOUS at 13:49

## 2022-06-10 ASSESSMENT — PAIN SCALES - GENERAL: PAINLEVEL: EXTREME PAIN (9)

## 2022-06-10 NOTE — CONFIDENTIAL NOTE
Pt called in to triage requesting IVF pain meds for all over pain rated 9/10 x 6-7 days. Stated last took prn Oxycodone and MS contin @ 6am this morning without relief. Denied any fevers, chills, cough, sob, chest pain, numbness or tingling or other symptoms not typical of sickle cell pain.   Pt's last infusion was 6/6/22, last clinic visit 6/6/22 with follow-up on Not yet scheduled.   Patient states they do not have own ride and it will take 60 minutes long to get to cancer clinic.   Pt denied being out of home medications and needing any refills today.   Pt qualifies for sickle cell standing order protocol.  If you do not hear from the infusion center by 2pm then you will not be able to get in for an infusion today.   Please note, if you are late for your appt, you risk losing your infusion appt as it may delay another patient's infusion who arrived on time.     7:24 BMT can take Jennifer at 1:30     Message sent to social work to arrange transportation     Call placed to [facility] and she is confirmed for 1:30 appt for IVF/Pain with BMT.       Message sent to scheduling to schedule appointment.

## 2022-06-10 NOTE — PROGRESS NOTES
Infusion Nursing Note:  Jennifer Cervantes presents today for add on IVF and pain meds.    Patient seen by provider today: No   present during visit today: Not Applicable.    Note: No labs, no provider today. 1L of D5 1/2 NS over 2 hours, and 2mg IVP Morphine Q1H for a max of 3 doses totaling 8mg IVP Morphine.    Intravenous Access:  Implanted Port.    Treatment Conditions:  Not Applicable.    Post Infusion Assessment:  Patient tolerated infusion without incident.     Discharge Plan:   Patient discharged in stable condition accompanied by: self.    Allison Wiggins RN

## 2022-06-10 NOTE — PROGRESS NOTES
Social Work Note: Telephone Call  Oncology Clinic     Data/Intervention:  Patient Name:  Jennifer Cervantes  /Age: 2000, 22 years old     Call From: [facility]        Reason for Call:  Transportation     Assessment:   called [employer] to arrange ride through patient's insurance. [facility] arranged  for patient from home with [employer] ([phone]).  Patient will need to call when ready for return ride home ([phone]).      Plan:  Patient is aware of the transportation plan.  available to assist with any other needs.      CARLOS Chavez,[facility]  Hematology/Oncology Social Worker  Phone:[phone] Pager: [phone]

## 2022-06-13 ENCOUNTER — PATIENT OUTREACH (OUTPATIENT)
Dept: ONCOLOGY | Facility: CLINIC | Age: 22
End: 2022-06-13
Payer: COMMERCIAL

## 2022-06-13 ENCOUNTER — PATIENT OUTREACH (OUTPATIENT)
Dept: CARE COORDINATION | Facility: CLINIC | Age: 22
End: 2022-06-13

## 2022-06-13 ENCOUNTER — INFUSION THERAPY VISIT (OUTPATIENT)
Dept: INFUSION THERAPY | Facility: CLINIC | Age: 22
End: 2022-06-13
Attending: PEDIATRICS
Payer: COMMERCIAL

## 2022-06-13 ENCOUNTER — TELEPHONE (OUTPATIENT)
Dept: ONCOLOGY | Facility: CLINIC | Age: 22
End: 2022-06-13
Payer: COMMERCIAL

## 2022-06-13 VITALS
DIASTOLIC BLOOD PRESSURE: 83 MMHG | HEART RATE: 106 BPM | SYSTOLIC BLOOD PRESSURE: 130 MMHG | OXYGEN SATURATION: 92 % | TEMPERATURE: 98.2 F

## 2022-06-13 DIAGNOSIS — D57.00 SICKLE CELL PAIN CRISIS (H): ICD-10-CM

## 2022-06-13 DIAGNOSIS — G81.10 SPASTIC HEMIPLEGIA, UNSPECIFIED ETIOLOGY, UNSPECIFIED LATERALITY (H): Primary | ICD-10-CM

## 2022-06-13 PROCEDURE — 258N000003 HC RX IP 258 OP 636: Performed by: PEDIATRICS

## 2022-06-13 PROCEDURE — 96374 THER/PROPH/DIAG INJ IV PUSH: CPT

## 2022-06-13 PROCEDURE — 250N000011 HC RX IP 250 OP 636: Performed by: PEDIATRICS

## 2022-06-13 PROCEDURE — 250N000013 HC RX MED GY IP 250 OP 250 PS 637: Performed by: PEDIATRICS

## 2022-06-13 PROCEDURE — 96376 TX/PRO/DX INJ SAME DRUG ADON: CPT

## 2022-06-13 PROCEDURE — 96361 HYDRATE IV INFUSION ADD-ON: CPT

## 2022-06-13 RX ORDER — ONDANSETRON 8 MG/1
8 TABLET, FILM COATED ORAL
Status: CANCELLED
Start: 2022-07-01

## 2022-06-13 RX ORDER — ONDANSETRON 8 MG/1
8 TABLET, ORALLY DISINTEGRATING ORAL
Status: COMPLETED | OUTPATIENT
Start: 2022-06-13 | End: 2022-06-13

## 2022-06-13 RX ORDER — HEPARIN SODIUM,PORCINE 10 UNIT/ML
5 VIAL (ML) INTRAVENOUS
Status: CANCELLED | OUTPATIENT
Start: 2022-07-01

## 2022-06-13 RX ORDER — HEPARIN SODIUM (PORCINE) LOCK FLUSH IV SOLN 100 UNIT/ML 100 UNIT/ML
5 SOLUTION INTRAVENOUS
Status: DISCONTINUED | OUTPATIENT
Start: 2022-06-13 | End: 2022-06-13 | Stop reason: HOSPADM

## 2022-06-13 RX ORDER — HEPARIN SODIUM (PORCINE) LOCK FLUSH IV SOLN 100 UNIT/ML 100 UNIT/ML
5 SOLUTION INTRAVENOUS
Status: CANCELLED | OUTPATIENT
Start: 2022-07-01

## 2022-06-13 RX ORDER — DIPHENHYDRAMINE HCL 25 MG
25 CAPSULE ORAL
Status: CANCELLED
Start: 2022-07-01

## 2022-06-13 RX ORDER — DIPHENHYDRAMINE HCL 25 MG
25 CAPSULE ORAL
Status: COMPLETED | OUTPATIENT
Start: 2022-06-13 | End: 2022-06-13

## 2022-06-13 RX ORDER — MORPHINE SULFATE 2 MG/ML
2 INJECTION, SOLUTION INTRAMUSCULAR; INTRAVENOUS
Status: DISCONTINUED | OUTPATIENT
Start: 2022-06-13 | End: 2022-06-13 | Stop reason: HOSPADM

## 2022-06-13 RX ORDER — ONDANSETRON 8 MG/1
8 TABLET, FILM COATED ORAL
Status: DISCONTINUED | OUTPATIENT
Start: 2022-06-13 | End: 2022-06-13 | Stop reason: CLARIF

## 2022-06-13 RX ORDER — MORPHINE SULFATE 2 MG/ML
2 INJECTION, SOLUTION INTRAMUSCULAR; INTRAVENOUS
Status: CANCELLED
Start: 2022-07-01

## 2022-06-13 RX ADMIN — MORPHINE SULFATE 2 MG: 2 INJECTION, SOLUTION INTRAMUSCULAR; INTRAVENOUS at 10:58

## 2022-06-13 RX ADMIN — MORPHINE SULFATE 2 MG: 2 INJECTION, SOLUTION INTRAMUSCULAR; INTRAVENOUS at 11:59

## 2022-06-13 RX ADMIN — ONDANSETRON 8 MG: 8 TABLET, ORALLY DISINTEGRATING ORAL at 10:57

## 2022-06-13 RX ADMIN — DEXTROSE AND SODIUM CHLORIDE 1000 ML: 5; 450 INJECTION, SOLUTION INTRAVENOUS at 10:57

## 2022-06-13 RX ADMIN — DIPHENHYDRAMINE HYDROCHLORIDE 25 MG: 25 CAPSULE ORAL at 10:57

## 2022-06-13 RX ADMIN — Medication 5 ML: at 13:23

## 2022-06-13 RX ADMIN — MORPHINE SULFATE 2 MG: 2 INJECTION, SOLUTION INTRAMUSCULAR; INTRAVENOUS at 12:55

## 2022-06-13 ASSESSMENT — PAIN SCALES - GENERAL: PAINLEVEL: WORST PAIN (10)

## 2022-06-13 NOTE — PROGRESS NOTES
[facility]: Cancer Care Short Note                                                                                          Outgoing Call:   LM for patient with appt dates with Dr. Duncan.  Will send letter in mail with dates and times and will send message to SW letting them know also since she will need a ride.      Patient to follow up as scheduled at next appt    Arely Krueger, RN  RN Care Coordinator   [facility]

## 2022-06-13 NOTE — LETTER
Date:June 16, 2022      Provider requested that no letter be sent. Do not send.       [facility]

## 2022-06-13 NOTE — PROGRESS NOTES
Infusion Nursing Note:  Jennifer Cervantes presents today for IVF.    Patient seen by provider today: No   present during visit today: Not Applicable.    Note: Rated low back pain 10/10 upon arrival.  D51/2NS given over 2 hours  25mg PO benadryl and 8mg ODT zofran administered as premeds  2mg morphine administered Q1H x3 doses  Pain decreased to 5/10 upon discharge    Intravenous Access:  Implanted Port.    Treatment Conditions:  Not Applicable.    Post Infusion Assessment:  Patient tolerated infusion without incident.  Blood return noted pre and post infusion.  Site patent and intact, free from redness, edema or discomfort.  No evidence of extravasations.  Access discontinued per protocol.     Discharge Plan:   Copy of AVS reviewed with patient and/or family.  Patient will return PRN for next appointment.  Patient discharged in stable condition accompanied by: self.  Departure Mode: Ambulatory.      Cristela Laguerre RN

## 2022-06-13 NOTE — LETTER
6/13/2022         RE: Jennifer Cervantes  [address]  [facility] [address]        Dear Colleague,    Thank you for referring your patient, Jennifer Cervantes, to the [facility]. Please see a copy of my visit note below.    Infusion Nursing Note:  Jennifer Cervantes presents today for IVF.    Patient seen by provider today: No   present during visit today: Not Applicable.    Note: Rated low back pain 10/10 upon arrival.  D51/2NS given over 2 hours  25mg PO benadryl and 8mg ODT zofran administered as premeds  2mg morphine administered Q1H x3 doses  Pain decreased to 5/10 upon discharge    Intravenous Access:  Implanted Port.    Treatment Conditions:  Not Applicable.    Post Infusion Assessment:  Patient tolerated infusion without incident.  Blood return noted pre and post infusion.  Site patent and intact, free from redness, edema or discomfort.  No evidence of extravasations.  Access discontinued per protocol.     Discharge Plan:   Copy of AVS reviewed with patient and/or family.  Patient will return PRN for next appointment.  Patient discharged in stable condition accompanied by: self.  Departure Mode: Ambulatory.      Cristela Laguerre RN                          Again, thank you for allowing me to participate in the care of your patient.        Sincerely,        [facility]

## 2022-06-13 NOTE — CONFIDENTIAL NOTE
Pt called in to triage requesting IVF pain meds for lower back pain rated 10/10 x 2-3 days. Stated last took prn oxycodone and MS contin at 6am this morning without relief. Denied any fevers, chills, cough, sob, chest pain, numbness or tingling or other symptoms not typical of sickle cell pain.   Pt's last infusion was 6/10/22, last clinic visit 6/6/22 with follow-up on 7/11/22 with Dr Duncan.   Patient states they do not have own ride and it will take 60 long to get to cancer clinic.   Pt denied being out of home medications and needing any refills today.   Pt qualifies for sickle cell standing order protocol.  If you do not hear from the infusion center by 2pm then you will not be able to get in for an infusion today.   Please note, if you are late for your appt, you risk losing your infusion appt as it may delay another patient's infusion who arrived on time.     7:14am [facility] can take at 11:00 am for IVF/pain     7:14am Call placed to Jennifer and she confirms she can make it to an 11:00am appointment.     7:17am Message sent to social work to assist in arranging transportation      7:20 am Message sent to scheduling to schedule appointment.

## 2022-06-16 DIAGNOSIS — D57.00 SICKLE CELL PAIN CRISIS (H): ICD-10-CM

## 2022-06-16 RX ORDER — OXYCODONE HYDROCHLORIDE 15 MG/1
15 TABLET ORAL EVERY 4 HOURS PRN
Qty: 45 TABLET | Refills: 0 | Status: SHIPPED | OUTPATIENT
Start: 2022-06-16 | End: 2022-06-24

## 2022-06-16 NOTE — TELEPHONE ENCOUNTER
Refill Request    Date of most recent appointment:  6/6/22  Next upcoming appointment:   7/11/22  Prescribing provider(s):  Dr. Duncan  Person requesting refill:  Jennifer    Medication requested:  Oxycodone 15 mg tablet  Quantity:  45  Last fill date:  6/9/22    Notes:  Pt states she is taking 6 tabs per day  Pain being treated sickle cell pain  Number of pills remaining: she doesn't know, was not at a place where she could count her pills at the time of the call  When will pt be out of meds: ?  Side effects: none  Number of times pt takes per day: q4h, 6 per day  Other NA    Not  reviewed.    Pended and Routed to Patricia Mantilla CNP because Dr. Duncan is out of the office.

## 2022-06-17 ENCOUNTER — TELEPHONE (OUTPATIENT)
Dept: ONCOLOGY | Facility: CLINIC | Age: 22
End: 2022-06-17
Payer: COMMERCIAL

## 2022-06-17 ENCOUNTER — INFUSION THERAPY VISIT (OUTPATIENT)
Dept: TRANSPLANT | Facility: CLINIC | Age: 22
End: 2022-06-17
Payer: COMMERCIAL

## 2022-06-17 ENCOUNTER — PATIENT OUTREACH (OUTPATIENT)
Dept: CARE COORDINATION | Facility: CLINIC | Age: 22
End: 2022-06-17

## 2022-06-17 VITALS
RESPIRATION RATE: 16 BRPM | TEMPERATURE: 98 F | SYSTOLIC BLOOD PRESSURE: 129 MMHG | DIASTOLIC BLOOD PRESSURE: 82 MMHG | OXYGEN SATURATION: 97 % | HEART RATE: 16 BPM

## 2022-06-17 DIAGNOSIS — D57.00 SICKLE CELL PAIN CRISIS (H): ICD-10-CM

## 2022-06-17 DIAGNOSIS — G81.10 SPASTIC HEMIPLEGIA, UNSPECIFIED ETIOLOGY, UNSPECIFIED LATERALITY (H): Primary | ICD-10-CM

## 2022-06-17 PROCEDURE — 250N000013 HC RX MED GY IP 250 OP 250 PS 637: Performed by: PEDIATRICS

## 2022-06-17 PROCEDURE — 96365 THER/PROPH/DIAG IV INF INIT: CPT

## 2022-06-17 PROCEDURE — 258N000003 HC RX IP 258 OP 636: Performed by: PEDIATRICS

## 2022-06-17 PROCEDURE — 96376 TX/PRO/DX INJ SAME DRUG ADON: CPT

## 2022-06-17 PROCEDURE — 96375 TX/PRO/DX INJ NEW DRUG ADDON: CPT

## 2022-06-17 PROCEDURE — 250N000011 HC RX IP 250 OP 636: Performed by: PEDIATRICS

## 2022-06-17 PROCEDURE — 96366 THER/PROPH/DIAG IV INF ADDON: CPT

## 2022-06-17 RX ORDER — HEPARIN SODIUM,PORCINE 10 UNIT/ML
5 VIAL (ML) INTRAVENOUS
Status: CANCELLED | OUTPATIENT
Start: 2022-07-01

## 2022-06-17 RX ORDER — HEPARIN SODIUM (PORCINE) LOCK FLUSH IV SOLN 100 UNIT/ML 100 UNIT/ML
5 SOLUTION INTRAVENOUS
Status: CANCELLED | OUTPATIENT
Start: 2022-07-01

## 2022-06-17 RX ORDER — DIPHENHYDRAMINE HCL 25 MG
25 CAPSULE ORAL
Status: CANCELLED
Start: 2022-07-01

## 2022-06-17 RX ORDER — MORPHINE SULFATE 2 MG/ML
2 INJECTION, SOLUTION INTRAMUSCULAR; INTRAVENOUS
Status: COMPLETED | OUTPATIENT
Start: 2022-06-17 | End: 2022-06-17

## 2022-06-17 RX ORDER — DIPHENHYDRAMINE HCL 25 MG
25 CAPSULE ORAL
Status: COMPLETED | OUTPATIENT
Start: 2022-06-17 | End: 2022-06-17

## 2022-06-17 RX ORDER — ONDANSETRON 8 MG/1
8 TABLET, FILM COATED ORAL
Status: CANCELLED
Start: 2022-07-01

## 2022-06-17 RX ORDER — HEPARIN SODIUM (PORCINE) LOCK FLUSH IV SOLN 100 UNIT/ML 100 UNIT/ML
5 SOLUTION INTRAVENOUS
Status: DISCONTINUED | OUTPATIENT
Start: 2022-06-17 | End: 2022-06-17 | Stop reason: HOSPADM

## 2022-06-17 RX ORDER — MORPHINE SULFATE 2 MG/ML
2 INJECTION, SOLUTION INTRAMUSCULAR; INTRAVENOUS
Status: CANCELLED
Start: 2022-07-01

## 2022-06-17 RX ADMIN — MORPHINE SULFATE 2 MG: 2 INJECTION, SOLUTION INTRAMUSCULAR; INTRAVENOUS at 12:57

## 2022-06-17 RX ADMIN — SODIUM CHLORIDE, PRESERVATIVE FREE 5 ML: 5 INJECTION INTRAVENOUS at 15:05

## 2022-06-17 RX ADMIN — DEXTROSE AND SODIUM CHLORIDE 1000 ML: 5; 450 INJECTION, SOLUTION INTRAVENOUS at 12:47

## 2022-06-17 RX ADMIN — DIPHENHYDRAMINE HYDROCHLORIDE 25 MG: 25 CAPSULE ORAL at 12:47

## 2022-06-17 RX ADMIN — MORPHINE SULFATE 2 MG: 2 INJECTION, SOLUTION INTRAMUSCULAR; INTRAVENOUS at 13:54

## 2022-06-17 RX ADMIN — MORPHINE SULFATE 2 MG: 2 INJECTION, SOLUTION INTRAMUSCULAR; INTRAVENOUS at 15:02

## 2022-06-17 ASSESSMENT — PAIN SCALES - GENERAL: PAINLEVEL: EXTREME PAIN (9)

## 2022-06-17 NOTE — PROGRESS NOTES
Infusion Nursing Note:  Jennifer Cervantes presents today for add-on IV fluid/pain medication.    Patient seen by provider today: No   present during visit today: Not Applicable.    Note: No labs were ordered .    Intravenous Access:  Implanted Port.    Treatment Conditions:  Per Provider orders, Patient received 25 mg oral Benadryl prior to receiving IV fluids over two hours.  Patient received three doses of 2 mg IV push Morphine, each spaced one hour apart.  Patient reported partial relief from her pain and was satisfied with today's treatment.    Post Infusion Assessment:  Patient tolerated infusion without incident.     Discharge Plan:   Patient was alert, orientated, ambulatory and  discharged in stable condition accompanied by: self.  She does have transportation.      ELINA WINTER RN

## 2022-06-17 NOTE — TELEPHONE ENCOUNTER
Pt called in to triage requesting IVF pain meds for lower back pain rated 9/10 x 6 days. Stated last took prn oxycodone, ms contin at 0600 this morning without relief. Denied any fevers, chills, cough, sob, chest pain, numbness or tingling or other symptoms not typical of sickle cell pain.     Pt's last infusion was 6/13, last clinic visit 6/6/22 with follow-up on 7/11/22 with Dr. Duncan.     Patient states needs transportation, 25 minutes from clinic.    Pt denied being out of home medications and needing any refills today.     Pt qualifies for sickle cell standing order protocol.    If you do not hear from the infusion center by 2pm then you will not be able to get in for an infusion today.     Added to infusion wait list.    BMT can offer apt at 1300. Jennifer confirmed she can come to apt. Contacted SW to assist with transportation. Asked [facility] to schedule. Updated BMT Infusion Charge Nurse.    Routed to Dr. Duncan and Arely Krueger, [facility]

## 2022-06-17 NOTE — NURSING NOTE
"Oncology Rooming Note    June 17, 2022 1:05 PM   Jennifer Cervantes is a 22 year old female who presents for:    Chief Complaint   Patient presents with     Infusion     Add-on infusion for IV fluids and pain medications for sickle cell crisis     Initial Vitals: /82   Pulse (!) 16   Temp 98  F (36.7  C) (Oral)   Resp 16   SpO2 97%  Estimated body mass index is 29.01 kg/m  as calculated from the following:    Height as of 3/20/22: 1.626 m (5' 4\").    Weight as of 6/6/22: 76.7 kg (169 lb). There is no height or weight on file to calculate BSA.  Extreme Pain (9) Comment: Data Unavailable   No LMP recorded. Patient has had an injection.  Allergies reviewed: Yes  Medications reviewed: Yes    Medications: Medication refills not needed today.  Pharmacy name entered into EPIC: [facility] [place], [place] - [phone]    Clinical concerns: Patient denies fevers/N/V/D/respiratory symptoms.  Patient reports 9/10 sickle cell pain.      ELINA WINTER RN              "

## 2022-06-20 ENCOUNTER — APPOINTMENT (OUTPATIENT)
Dept: LAB | Facility: CLINIC | Age: 22
End: 2022-06-20
Attending: PEDIATRICS
Payer: COMMERCIAL

## 2022-06-20 ENCOUNTER — PATIENT OUTREACH (OUTPATIENT)
Dept: CARE COORDINATION | Facility: CLINIC | Age: 22
End: 2022-06-20

## 2022-06-20 ENCOUNTER — TELEPHONE (OUTPATIENT)
Dept: ONCOLOGY | Facility: CLINIC | Age: 22
End: 2022-06-20
Payer: COMMERCIAL

## 2022-06-20 ENCOUNTER — INFUSION THERAPY VISIT (OUTPATIENT)
Dept: TRANSPLANT | Facility: CLINIC | Age: 22
End: 2022-06-20
Attending: PEDIATRICS
Payer: COMMERCIAL

## 2022-06-20 VITALS
BODY MASS INDEX: 29.08 KG/M2 | WEIGHT: 169.4 LBS | OXYGEN SATURATION: 93 % | SYSTOLIC BLOOD PRESSURE: 119 MMHG | RESPIRATION RATE: 16 BRPM | HEART RATE: 103 BPM | DIASTOLIC BLOOD PRESSURE: 80 MMHG | TEMPERATURE: 98.5 F

## 2022-06-20 DIAGNOSIS — G81.10 SPASTIC HEMIPLEGIA, UNSPECIFIED ETIOLOGY, UNSPECIFIED LATERALITY (H): Primary | ICD-10-CM

## 2022-06-20 DIAGNOSIS — D57.00 SICKLE CELL PAIN CRISIS (H): ICD-10-CM

## 2022-06-20 LAB
ALBUMIN SERPL-MCNC: 4.1 G/DL (ref 3.4–5)
ALP SERPL-CCNC: 82 U/L (ref 40–150)
ALT SERPL W P-5'-P-CCNC: 46 U/L (ref 0–50)
ANION GAP SERPL CALCULATED.3IONS-SCNC: 6 MMOL/L (ref 3–14)
AST SERPL W P-5'-P-CCNC: 57 U/L (ref 0–45)
BASOPHILS # BLD AUTO: 0.1 10E3/UL (ref 0–0.2)
BASOPHILS NFR BLD AUTO: 1 %
BILIRUB SERPL-MCNC: 4.2 MG/DL (ref 0.2–1.3)
BUN SERPL-MCNC: 4 MG/DL (ref 7–30)
CALCIUM SERPL-MCNC: 8.8 MG/DL (ref 8.5–10.1)
CHLORIDE BLD-SCNC: 112 MMOL/L (ref 94–109)
CO2 SERPL-SCNC: 20 MMOL/L (ref 20–32)
CREAT SERPL-MCNC: 0.42 MG/DL (ref 0.52–1.04)
EOSINOPHIL # BLD AUTO: 0.7 10E3/UL (ref 0–0.7)
EOSINOPHIL NFR BLD AUTO: 7 %
ERYTHROCYTE [DISTWIDTH] IN BLOOD BY AUTOMATED COUNT: 23.4 % (ref 10–15)
GFR SERPL CREATININE-BSD FRML MDRD: >90 ML/MIN/1.73M2
GLUCOSE BLD-MCNC: 91 MG/DL (ref 70–99)
HCT VFR BLD AUTO: 20.4 % (ref 35–47)
HGB BLD-MCNC: 7.3 G/DL (ref 11.7–15.7)
IMM GRANULOCYTES # BLD: 0.1 10E3/UL
IMM GRANULOCYTES NFR BLD: 1 %
LYMPHOCYTES # BLD AUTO: 2.4 10E3/UL (ref 0.8–5.3)
LYMPHOCYTES NFR BLD AUTO: 23 %
MCH RBC QN AUTO: 31.6 PG (ref 26.5–33)
MCHC RBC AUTO-ENTMCNC: 35.8 G/DL (ref 31.5–36.5)
MCV RBC AUTO: 88 FL (ref 78–100)
MONOCYTES # BLD AUTO: 0.9 10E3/UL (ref 0–1.3)
MONOCYTES NFR BLD AUTO: 8 %
NEUTROPHILS # BLD AUTO: 6.4 10E3/UL (ref 1.6–8.3)
NEUTROPHILS NFR BLD AUTO: 60 %
NRBC # BLD AUTO: 0.5 10E3/UL
NRBC BLD AUTO-RTO: 5 /100
PLATELET # BLD AUTO: 421 10E3/UL (ref 150–450)
POTASSIUM BLD-SCNC: 3.3 MMOL/L (ref 3.4–5.3)
PROT SERPL-MCNC: 7.6 G/DL (ref 6.8–8.8)
RBC # BLD AUTO: 2.31 10E6/UL (ref 3.8–5.2)
SODIUM SERPL-SCNC: 138 MMOL/L (ref 133–144)
WBC # BLD AUTO: 10.6 10E3/UL (ref 4–11)

## 2022-06-20 PROCEDURE — 250N000013 HC RX MED GY IP 250 OP 250 PS 637: Performed by: PEDIATRICS

## 2022-06-20 PROCEDURE — 258N000003 HC RX IP 258 OP 636: Performed by: PEDIATRICS

## 2022-06-20 PROCEDURE — 250N000011 HC RX IP 250 OP 636: Performed by: PEDIATRICS

## 2022-06-20 PROCEDURE — 96376 TX/PRO/DX INJ SAME DRUG ADON: CPT

## 2022-06-20 PROCEDURE — 80053 COMPREHEN METABOLIC PANEL: CPT

## 2022-06-20 PROCEDURE — 96374 THER/PROPH/DIAG INJ IV PUSH: CPT

## 2022-06-20 PROCEDURE — 85025 COMPLETE CBC W/AUTO DIFF WBC: CPT

## 2022-06-20 PROCEDURE — 36591 DRAW BLOOD OFF VENOUS DEVICE: CPT

## 2022-06-20 PROCEDURE — 96361 HYDRATE IV INFUSION ADD-ON: CPT

## 2022-06-20 RX ORDER — HEPARIN SODIUM,PORCINE 10 UNIT/ML
5 VIAL (ML) INTRAVENOUS
Status: CANCELLED | OUTPATIENT
Start: 2022-07-01

## 2022-06-20 RX ORDER — ONDANSETRON 8 MG/1
8 TABLET, FILM COATED ORAL
Status: CANCELLED
Start: 2022-07-01

## 2022-06-20 RX ORDER — HEPARIN SODIUM (PORCINE) LOCK FLUSH IV SOLN 100 UNIT/ML 100 UNIT/ML
5 SOLUTION INTRAVENOUS
Status: CANCELLED | OUTPATIENT
Start: 2022-07-01

## 2022-06-20 RX ORDER — MORPHINE SULFATE 2 MG/ML
2 INJECTION, SOLUTION INTRAMUSCULAR; INTRAVENOUS
Status: CANCELLED
Start: 2022-07-01

## 2022-06-20 RX ORDER — HEPARIN SODIUM (PORCINE) LOCK FLUSH IV SOLN 100 UNIT/ML 100 UNIT/ML
5 SOLUTION INTRAVENOUS ONCE
Status: COMPLETED | OUTPATIENT
Start: 2022-06-20 | End: 2022-06-20

## 2022-06-20 RX ORDER — DIPHENHYDRAMINE HCL 25 MG
25 CAPSULE ORAL
Status: CANCELLED
Start: 2022-07-01

## 2022-06-20 RX ORDER — MORPHINE SULFATE 2 MG/ML
2 INJECTION, SOLUTION INTRAMUSCULAR; INTRAVENOUS
Status: COMPLETED | OUTPATIENT
Start: 2022-06-20 | End: 2022-06-20

## 2022-06-20 RX ORDER — DIPHENHYDRAMINE HCL 25 MG
25 CAPSULE ORAL
Status: COMPLETED | OUTPATIENT
Start: 2022-06-20 | End: 2022-06-20

## 2022-06-20 RX ADMIN — MORPHINE SULFATE 2 MG: 2 INJECTION, SOLUTION INTRAMUSCULAR; INTRAVENOUS at 09:33

## 2022-06-20 RX ADMIN — MORPHINE SULFATE 2 MG: 2 INJECTION, SOLUTION INTRAMUSCULAR; INTRAVENOUS at 10:29

## 2022-06-20 RX ADMIN — DIPHENHYDRAMINE HYDROCHLORIDE 25 MG: 25 CAPSULE ORAL at 09:37

## 2022-06-20 RX ADMIN — MORPHINE SULFATE 2 MG: 2 INJECTION, SOLUTION INTRAMUSCULAR; INTRAVENOUS at 11:27

## 2022-06-20 RX ADMIN — Medication 5 ML: at 09:09

## 2022-06-20 RX ADMIN — DEXTROSE AND SODIUM CHLORIDE 1000 ML: 5; 450 INJECTION, SOLUTION INTRAVENOUS at 09:27

## 2022-06-20 ASSESSMENT — PAIN SCALES - GENERAL: PAINLEVEL: EXTREME PAIN (9)

## 2022-06-20 NOTE — NURSING NOTE
"Oncology Rooming Note    June 20, 2022 11:42 AM   Jennifer Cervantes is a 22 year old female who presents for:    Chief Complaint   Patient presents with     Port Draw     Labs drawn via port by RN in lab. VS taken.      Infusion     Add on Infusion r/t Sickle Cell Disease     Initial Vitals: /80   Pulse 103   Temp 98.5  F (36.9  C) (Tympanic)   Resp 16   Wt 76.8 kg (169 lb 6.4 oz)   SpO2 93%   BMI 29.08 kg/m   Estimated body mass index is 29.08 kg/m  as calculated from the following:    Height as of 3/20/22: 1.626 m (5' 4\").    Weight as of this encounter: 76.8 kg (169 lb 6.4 oz). Body surface area is 1.86 meters squared.  Extreme Pain (9) Comment: Data Unavailable   No LMP recorded. Patient has had an injection.  Allergies reviewed: Yes  Medications reviewed: Yes    Medications: Medication refills not needed today.  Pharmacy name entered into [facility]: [facility] [place], [place] - [address] [place] [address] [phone]    Clinical concerns: VSS.        Maria M Gonzalez RN              "

## 2022-06-20 NOTE — PROGRESS NOTES
Infusion Nursing Note:  Jennifer Cervantes presents today for 1L D5W with 1/2 NS, IV morphine, PO Benadryl .    Patient seen by provider today: No   present during visit today: Not Applicable.    Note: Signed in generic plan.    Intravenous Access:  Implanted Port.  Flushes well, +BR  deaccessed prior to discharge    Treatment Conditions:  Pt received 1L D5W with 1/2 over 2 hours and 2 mg morphine x3 for pain ranging from 6-9. Please see flowsheet. Pt K+ was 3.3. Pt encouraged to increase potassium in diet as patient was leaving when labs were discussed and patient did not want potassium replacement. Pt V/U.      Post Infusion Assessment:  Patient tolerated infusion without incident.     Discharge Plan:   Patient discharged in stable condition accompanied by: self.      Maria M Gonzalez RN

## 2022-06-20 NOTE — TELEPHONE ENCOUNTER
0829 Jennifer called concerned about transportation and making appt at 9:00am.    This writer informed Jennifer that transportation being set up now and also checked with infusion nurses that they can still take pt as long as arrived by 9:30am so some wiggle room today with appt time to accommodate for transportation needs.    Jennifer thanked this writer.

## 2022-06-20 NOTE — CONFIDENTIAL NOTE
Pt called in to triage requesting IVF pain meds for lower back pain rated 9/10 x 2-3 days. Stated last took prn Oxycodone at 6am this morning without relief. Denied any fevers, chills, cough, sob, chest pain, numbness or tingling or other symptoms not typical of sickle cell pain.   Pt's last infusion was 6/17/22, last clinic visit 6/6/22 with follow-up on 7/11/22 with Dr Duncan.   Patient states they do not have own ride and it will take 60 minutes long to get to cancer clinic.   Pt denied being out of home medications and needing any refills today.   Pt qualifies for sickle cell standing order protocol.  If you do not hear from the infusion center by 2pm then you will not be able to get in for an infusion today.   Please note, if you are late for your appt, you risk losing your infusion appt as it may delay another patient's infusion who arrived on time.     7:35 BMT can take Jennifer at 9am for IVF/pain meds     7:35 Call placed to Jennifer to confirm appt.     7:36 Message sent to social work to arrange transportation for Jennifer to her appt at 9 am.    7:37 Message sent to Scheduling to schedule appt at 9am for IVF/pain meds no labs.

## 2022-06-20 NOTE — PROGRESS NOTES
This is a recent snapshot of the patient's [facility] medical record.  For current drug dose and complete information and questions, call [phone] or In Basket pool, fv home infusion ([id number])  CSN Number:  [id number]

## 2022-06-20 NOTE — PROGRESS NOTES
This is a recent snapshot of the patient's [facility] medical record.  For current drug dose and complete information and questions, call [phone] or In Basket pool, fv home infusion ([id number])  CSN Number:  [id number]

## 2022-06-20 NOTE — LETTER
6/20/2022         RE: Jennifer Cervantes  [address]  [place] [place] [facility] [address]        Dear Colleague,    Thank you for referring your patient, Jennifer Cervantes, to the [facility] BLOOD AND MARROW TRANSPLANT PROGRAM [place]. Please see a copy of my visit note below.    Infusion Nursing Note:  Jennifer Cervantes presents today for 1L D5W with 1/2 NS, IV morphine, PO Benadryl .    Patient seen by provider today: No   present during visit today: Not Applicable.    Note: Signed in generic plan.    Intravenous Access:  Implanted Port.  Flushes well, +BR  deaccessed prior to discharge    Treatment Conditions:  Pt received 1L D5W with 1/2 over 2 hours and 2 mg morphine x3 for pain ranging from 6-9. Please see flowsheet. Pt K+ was 3.3. Pt encouraged to increase potassium in diet as patient was leaving when labs were discussed and patient did not want potassium replacement. Pt V/U.      Post Infusion Assessment:  Patient tolerated infusion without incident.     Discharge Plan:   Patient discharged in stable condition accompanied by: self.      Maria M Gonzalez RN                          Again, thank you for allowing me to participate in the care of your patient.        Sincerely,        [facility]

## 2022-06-21 NOTE — PROGRESS NOTES
This is a recent snapshot of the patient's [facility] medical record.  For current drug dose and complete information and questions, call [phone] or In Basket pool, fv home infusion ([id number])  CSN Number:  [id number]

## 2022-06-22 NOTE — PROGRESS NOTES
This is a recent snapshot of the patient's [facility] medical record.  For current drug dose and complete information and questions, call [phone] or In Basket pool, fv home infusion ([id number])  CSN Number:  [id number]

## 2022-06-24 ENCOUNTER — PATIENT OUTREACH (OUTPATIENT)
Dept: CARE COORDINATION | Facility: CLINIC | Age: 22
End: 2022-06-24

## 2022-06-24 ENCOUNTER — INFUSION THERAPY VISIT (OUTPATIENT)
Dept: INFUSION THERAPY | Facility: CLINIC | Age: 22
End: 2022-06-24
Attending: PEDIATRICS
Payer: COMMERCIAL

## 2022-06-24 ENCOUNTER — TELEPHONE (OUTPATIENT)
Dept: ONCOLOGY | Facility: CLINIC | Age: 22
End: 2022-06-24

## 2022-06-24 VITALS
RESPIRATION RATE: 16 BRPM | DIASTOLIC BLOOD PRESSURE: 75 MMHG | TEMPERATURE: 98.1 F | HEART RATE: 118 BPM | SYSTOLIC BLOOD PRESSURE: 133 MMHG | OXYGEN SATURATION: 94 %

## 2022-06-24 DIAGNOSIS — D57.00 SICKLE CELL PAIN CRISIS (H): ICD-10-CM

## 2022-06-24 DIAGNOSIS — G81.10 SPASTIC HEMIPLEGIA, UNSPECIFIED ETIOLOGY, UNSPECIFIED LATERALITY (H): Primary | ICD-10-CM

## 2022-06-24 PROCEDURE — 258N000003 HC RX IP 258 OP 636: Performed by: PEDIATRICS

## 2022-06-24 PROCEDURE — 96361 HYDRATE IV INFUSION ADD-ON: CPT

## 2022-06-24 PROCEDURE — 250N000011 HC RX IP 250 OP 636: Performed by: PEDIATRICS

## 2022-06-24 PROCEDURE — 96374 THER/PROPH/DIAG INJ IV PUSH: CPT

## 2022-06-24 PROCEDURE — 96376 TX/PRO/DX INJ SAME DRUG ADON: CPT

## 2022-06-24 RX ORDER — HEPARIN SODIUM,PORCINE 10 UNIT/ML
5 VIAL (ML) INTRAVENOUS
Status: CANCELLED | OUTPATIENT
Start: 2022-07-01

## 2022-06-24 RX ORDER — ONDANSETRON 8 MG/1
8 TABLET, FILM COATED ORAL
Status: CANCELLED
Start: 2022-07-01

## 2022-06-24 RX ORDER — DIPHENHYDRAMINE HCL 25 MG
25 CAPSULE ORAL
Status: CANCELLED
Start: 2022-07-01

## 2022-06-24 RX ORDER — MORPHINE SULFATE 2 MG/ML
2 INJECTION, SOLUTION INTRAMUSCULAR; INTRAVENOUS
Status: CANCELLED
Start: 2022-07-01

## 2022-06-24 RX ORDER — HEPARIN SODIUM (PORCINE) LOCK FLUSH IV SOLN 100 UNIT/ML 100 UNIT/ML
5 SOLUTION INTRAVENOUS
Status: DISCONTINUED | OUTPATIENT
Start: 2022-06-24 | End: 2022-06-24 | Stop reason: HOSPADM

## 2022-06-24 RX ORDER — OXYCODONE HYDROCHLORIDE 15 MG/1
15 TABLET ORAL EVERY 4 HOURS PRN
Qty: 45 TABLET | Refills: 0 | Status: SHIPPED | OUTPATIENT
Start: 2022-06-24 | End: 2022-07-01

## 2022-06-24 RX ORDER — MORPHINE SULFATE 2 MG/ML
2 INJECTION, SOLUTION INTRAMUSCULAR; INTRAVENOUS
Status: DISCONTINUED | OUTPATIENT
Start: 2022-06-24 | End: 2022-06-24 | Stop reason: HOSPADM

## 2022-06-24 RX ORDER — HEPARIN SODIUM (PORCINE) LOCK FLUSH IV SOLN 100 UNIT/ML 100 UNIT/ML
5 SOLUTION INTRAVENOUS
Status: CANCELLED | OUTPATIENT
Start: 2022-07-01

## 2022-06-24 RX ADMIN — DEXTROSE AND SODIUM CHLORIDE 1000 ML: 5; 450 INJECTION, SOLUTION INTRAVENOUS at 13:07

## 2022-06-24 RX ADMIN — Medication 5 ML: at 15:10

## 2022-06-24 RX ADMIN — MORPHINE SULFATE 2 MG: 2 INJECTION, SOLUTION INTRAMUSCULAR; INTRAVENOUS at 14:00

## 2022-06-24 RX ADMIN — MORPHINE SULFATE 2 MG: 2 INJECTION, SOLUTION INTRAMUSCULAR; INTRAVENOUS at 15:00

## 2022-06-24 RX ADMIN — MORPHINE SULFATE 2 MG: 2 INJECTION, SOLUTION INTRAMUSCULAR; INTRAVENOUS at 13:07

## 2022-06-24 NOTE — TELEPHONE ENCOUNTER
Narcotic Refill Request    Medication(s) requested:  Oxycodone 15mg  Person Requesting Refill:Jennifer (pt)  What pain is the medication treating: sickle cell pain  How is the medication being taken?:1 tablet every 4hours  Does pt have enough for today? No  Is pain being adequately controlled on the current regimen?: Yes with additional IVF/Pain infusions as needed  Experiencing any side effects from medication?: denies    Date of most recent appointment:  6/6/22  Any No Show Visits:none  Next appointment:   7/11/22 Dr. Duncan  Last fill date and by whom:  Patricia Mantilla CNP   Reviewed:       Routed provider: Dr. Duncan

## 2022-06-24 NOTE — LETTER
Date:June 24, 2022      Provider requested that no letter be sent. Do not send.       [facility]

## 2022-06-24 NOTE — PROGRESS NOTES
SW received referral to assist with transportation coordination. SW called [employer] and arranged ride for patient. SW informed patient and clinic of ride information and patient confirmed they have the number to active their will call return ride home.  SW will remain available as needed.         Corry GONZALEZ, [facility]  - Oncology  Phone : [phone]  Pager: [phone]

## 2022-06-24 NOTE — TELEPHONE ENCOUNTER
"Oncology Nurse Triage - Sickle Cell Pain Crisis:    Situation: Jennifer (pt) calling about Sickle Cell Pain Crisis    Background:     Treating Provider:   Dr. Duncan    Date of last office visit: 6/6/22     Did they no show to last appointment: No     Date of last infusion: 6/20/22    Has patient been seen in ED or infusion in the last 3 days? (if yes, when): No    Does patient have active treatment plan?  Yes      Assessment of Symptoms:  Onset/Duration of symptoms: 1 day    Does pain feel like patients typical sickle cell pain? Yes   Location: \"all over  Character: Sharp           Intensity: 9/10    Accompanying Signs & Symptoms:  No weakness, discoloration, numbness of extremities.    Fever:  No    Chest Pain Present (if yes when?): No     Shortness of breath: No     Urinary symptoms:   No changes in voiding. Normal frequency and characteristics.    Oral intake:   Regular diet, tolerating without issues.     Other home therapies attempted: HEAT/HEATING PAD hot shower    Have home medications been tried:  Yes  Last home medication taken and when: Oxycodone 15mg 1 tablet at 6am.     Number of doses of home medications have been attempted and when was first dose taken:  1 tablet every 4hours    Is patient out of home medications: Yes     Additional information (if necessary):  Meets protocol. Treatment plan allows 3 consecutive infusions if pt meets protocol.     Does patient have transportation: No       Grades for reference:       Grade 1 -   o Patient has active treatment plan.   o Patients last scheduled clinic appointment was attended  o Patient has not been seen in infusion or ED in the last 3 days (clarify exclusions in therapy plans)   o Afebrile   o Typically sickle cell pain   o Home medications have been tried   o No other symptoms       Grade 2 -   o Temperature of over 100.0   o Different sickle cell pain   o Decreased PO intake   o Arm or left swelling   o ED or infusion visit within the last 3 days. "   o Out of home medications      Grade 3 -   o New chest pain   o New shortness of breath  o Change in mental status     Recommendations:   [facility] has appt available today at 1300  0720 Pt in agreement, will wait to hear from  about transportation.     0724 Scheduling request sent    0726  paged for transportation assistance    If you do not hear from the infusion center by 2pm then you will not be able to get in for an infusion today. If symptoms worsen while waiting for call back, and/or you experience fever, chills, SOB, chest pain, cough, n/v, dizziness, numbness, swelling, discoloration of extremities, then seek emergency evaluation in Emergency Department.

## 2022-06-24 NOTE — PROGRESS NOTES
SW received referral to assist with transportation coordination. SW called [employer] and arranged ride for patient. SW informed patient and clinic of ride information and patient confirmed they have the number to active their will call return ride home.  SW will remain available as needed.         Corry GONZALEZ, [facility]  - Oncology  Phone : [phone]  Pager: [phone]

## 2022-06-24 NOTE — PROGRESS NOTES
Social Work Note: Telephone Call  Oncology Clinic     Data/Intervention:   Patient Name:  Jennifer Cervantes   /Age: 2000, 22 years old     Call From: [facility]        Reason for Call:  Transportation     Assessment:   called [employer] to arrange ride through patient's insurance. [employer] arranged  for patient from home with [employer] ([phone]).  Patient will need to call when ready for return ride home ([phone]).      Plan:  Patient is aware of the transportation plan.  available to assist with any other needs.      CARLOS Chavez,[facility]  Hematology/Oncology Social Worker  Phone:[phone] Pager: [phone]

## 2022-06-24 NOTE — PATIENT INSTRUCTIONS
Dear Jennifer Cervantes    Thank you for choosing [facility] ([facility]) for your infusion.  The following information is a summary of our appointment as well as important reminders.      We look forward in seeing you on your next appointment here at [facility] ([facility]).  Please don t hesitate to call us at [phone] to reschedule any of your appointments or to speak with one of the [facility] registered nurses.  It was a pleasure taking care of you today.    Sincerely,    [facility]  [address] [place] [address]  [address]  Phone:  [phone]

## 2022-06-24 NOTE — LETTER
6/24/2022         RE: Jennifer Cervantes  [address]  [place] [address]        Dear Colleague,    Thank you for referring your patient, Jennifer Cervantes, to the [facility]. Please see a copy of my visit note below.    Infusion Nursing Note:  Jennifer Cervantes presents today for IVF, pain meds.    Patient seen by provider today: No   present during visit today: Not Applicable.    Note: patient presents with 9/10 sickle cell pain to lower back. Patient received 3 doses of morphine and pain came down to 5/10 which patient states is tolerable.    Intravenous Access:  Implanted Port.    Treatment Conditions:  Not Applicable.    Post Infusion Assessment:  Patient tolerated infusion without incident.  Blood return noted pre and post infusion.  Site patent and intact, free from redness, edema or discomfort.  No evidence of extravasations.  Access discontinued per protocol.     Discharge Plan:   Discharge instructions reviewed with: Patient.  Patient and/or family verbalized understanding of discharge instructions and all questions answered.  AVS to patient via [employer].  Patient will return PRN for next appointment.   Patient discharged in stable condition accompanied by: self.  Departure Mode: Ambulatory.    Administrations This Visit     dextrose 5% and 0.45% NaCl BOLUS     Admin Date  06/24/2022 Action  New Bag Dose  1,000 mL Rate  500 mL/hr Route  Intravenous Administered By  Claudia Landry RN          heparin 100 UNIT/ML injection 5 mL     Admin Date  06/24/2022 Action  Given Dose  5 mL Route  Intracatheter Administered By  Claudia Landry RN          morphine (PF) injection 2 mg     Admin Date  06/24/2022 Action  Given Dose  2 mg Route  Intravenous Administered By  Claudia Landry, JOE           Admin Date  06/24/2022 Action  Given Dose  2 mg Route  Intravenous Administered By  Claudia Landry RN           Admin Date  06/24/2022 Action  Given Dose  2 mg  Route  Intravenous Administered By  Claudia Landry, RN              Claudia Landry, JOE                        Again, thank you for allowing me to participate in the care of your patient.        Sincerely,        [facility]

## 2022-06-24 NOTE — PROGRESS NOTES
SW received referral to assist with transportation coordination. SW called [employer] and arranged ride for patient. SW informed patient and clinic of ride information. Patient will call [phone] for return ride when appointment is complete. SW will remain available as needed.            Corry GONZALEZ, [facility]  - Oncology  Phone : [phone]  Pager: [phone]

## 2022-06-24 NOTE — PROGRESS NOTES
Infusion Nursing Note:  Jennifer Cervantes presents today for IVF, pain meds.    Patient seen by provider today: No   present during visit today: Not Applicable.    Note: patient presents with 9/10 sickle cell pain to lower back. Patient received 3 doses of morphine and pain came down to 5/10 which patient states is tolerable.    Intravenous Access:  Implanted Port.    Treatment Conditions:  Not Applicable.    Post Infusion Assessment:  Patient tolerated infusion without incident.  Blood return noted pre and post infusion.  Site patent and intact, free from redness, edema or discomfort.  No evidence of extravasations.  Access discontinued per protocol.     Discharge Plan:   Discharge instructions reviewed with: Patient.  Patient and/or family verbalized understanding of discharge instructions and all questions answered.  AVS to patient via [employer].  Patient will return PRN for next appointment.   Patient discharged in stable condition accompanied by: self.  Departure Mode: Ambulatory.    Administrations This Visit     dextrose 5% and 0.45% NaCl BOLUS     Admin Date  06/24/2022 Action  New Bag Dose  1,000 mL Rate  500 mL/hr Route  Intravenous Administered By  Claudia Landry RN          heparin 100 UNIT/ML injection 5 mL     Admin Date  06/24/2022 Action  Given Dose  5 mL Route  Intracatheter Administered By  Claudia Landry RN          morphine (PF) injection 2 mg     Admin Date  06/24/2022 Action  Given Dose  2 mg Route  Intravenous Administered By  Claudia Landry RN           Admin Date  06/24/2022 Action  Given Dose  2 mg Route  Intravenous Administered By  Claudia Landry RN           Admin Date  06/24/2022 Action  Given Dose  2 mg Route  Intravenous Administered By  Claudia Landry RN Julie Backhaus, RN

## 2022-06-26 ENCOUNTER — HOSPITAL ENCOUNTER (EMERGENCY)
Facility: CLINIC | Age: 22
Discharge: HOME | End: 2022-06-26
Attending: EMERGENCY MEDICINE | Admitting: EMERGENCY MEDICINE
Payer: COMMERCIAL

## 2022-06-26 VITALS
SYSTOLIC BLOOD PRESSURE: 104 MMHG | WEIGHT: 169 LBS | DIASTOLIC BLOOD PRESSURE: 59 MMHG | BODY MASS INDEX: 29.01 KG/M2 | TEMPERATURE: 99.3 F | OXYGEN SATURATION: 94 % | HEART RATE: 104 BPM | RESPIRATION RATE: 16 BRPM

## 2022-06-26 DIAGNOSIS — D57.00 SICKLE CELL PAIN CRISIS (H): ICD-10-CM

## 2022-06-26 LAB
ANION GAP SERPL CALCULATED.3IONS-SCNC: 13 MMOL/L (ref 7–15)
BASOPHILS # BLD MANUAL: 0.3 10E3/UL (ref 0–0.2)
BASOPHILS NFR BLD MANUAL: 2 %
BUN SERPL-MCNC: 11 MG/DL (ref 6–20)
BURR CELLS BLD QL SMEAR: SLIGHT
CALCIUM SERPL-MCNC: 8.7 MG/DL (ref 8.6–10)
CHLORIDE SERPL-SCNC: 104 MMOL/L (ref 98–107)
CREAT SERPL-MCNC: 0.59 MG/DL (ref 0.51–0.95)
DEPRECATED HCO3 PLAS-SCNC: 18 MMOL/L (ref 22–29)
ELLIPTOCYTES BLD QL SMEAR: ABNORMAL
EOSINOPHIL # BLD MANUAL: 1 10E3/UL (ref 0–0.7)
EOSINOPHIL NFR BLD MANUAL: 7 %
ERYTHROCYTE [DISTWIDTH] IN BLOOD BY AUTOMATED COUNT: 23.9 % (ref 10–15)
GFR SERPL CREATININE-BSD FRML MDRD: >90 ML/MIN/1.73M2
GLUCOSE SERPL-MCNC: 95 MG/DL (ref 70–99)
HCT VFR BLD AUTO: 19.2 % (ref 35–47)
HGB BLD-MCNC: 7.6 G/DL (ref 11.7–15.7)
HGB BLD-MCNC: ABNORMAL G/DL
HOLD SPECIMEN: NORMAL
LYMPHOCYTES # BLD MANUAL: 2.4 10E3/UL (ref 0.8–5.3)
LYMPHOCYTES NFR BLD MANUAL: 17 %
MCH RBC QN AUTO: ABNORMAL PG
MCHC RBC AUTO-ENTMCNC: ABNORMAL G/DL
MCV RBC AUTO: 85 FL (ref 78–100)
MONOCYTES # BLD MANUAL: 0.4 10E3/UL (ref 0–1.3)
MONOCYTES NFR BLD MANUAL: 3 %
NEUTROPHILS # BLD MANUAL: 10.2 10E3/UL (ref 1.6–8.3)
NEUTROPHILS NFR BLD MANUAL: 71 %
NRBC # BLD AUTO: 1.3 10E3/UL
NRBC BLD MANUAL-RTO: 9 %
PLAT MORPH BLD: ABNORMAL
PLATELET # BLD AUTO: 373 10E3/UL (ref 150–450)
POLYCHROMASIA BLD QL SMEAR: SLIGHT
POTASSIUM SERPL-SCNC: 3.4 MMOL/L (ref 3.4–5.3)
RBC # BLD AUTO: 2.27 10E6/UL (ref 3.8–5.2)
RBC MORPH BLD: ABNORMAL
RETICS # AUTO: 0.44 10E6/UL (ref 0.03–0.1)
RETICS/RBC NFR AUTO: 19.6 % (ref 0.5–2)
SICKLE CELLS BLD QL SMEAR: ABNORMAL
SODIUM SERPL-SCNC: 135 MMOL/L (ref 136–145)
WBC # BLD AUTO: 14.4 10E3/UL (ref 4–11)

## 2022-06-26 PROCEDURE — 258N000003 HC RX IP 258 OP 636: Performed by: EMERGENCY MEDICINE

## 2022-06-26 PROCEDURE — 96375 TX/PRO/DX INJ NEW DRUG ADDON: CPT | Performed by: EMERGENCY MEDICINE

## 2022-06-26 PROCEDURE — 36415 COLL VENOUS BLD VENIPUNCTURE: CPT | Performed by: EMERGENCY MEDICINE

## 2022-06-26 PROCEDURE — 99284 EMERGENCY DEPT VISIT MOD MDM: CPT | Mod: 25 | Performed by: EMERGENCY MEDICINE

## 2022-06-26 PROCEDURE — 96374 THER/PROPH/DIAG INJ IV PUSH: CPT | Performed by: EMERGENCY MEDICINE

## 2022-06-26 PROCEDURE — 85049 AUTOMATED PLATELET COUNT: CPT | Performed by: EMERGENCY MEDICINE

## 2022-06-26 PROCEDURE — 99283 EMERGENCY DEPT VISIT LOW MDM: CPT | Performed by: EMERGENCY MEDICINE

## 2022-06-26 PROCEDURE — 250N000013 HC RX MED GY IP 250 OP 250 PS 637: Performed by: EMERGENCY MEDICINE

## 2022-06-26 PROCEDURE — 96361 HYDRATE IV INFUSION ADD-ON: CPT | Performed by: EMERGENCY MEDICINE

## 2022-06-26 PROCEDURE — 250N000011 HC RX IP 250 OP 636: Performed by: EMERGENCY MEDICINE

## 2022-06-26 PROCEDURE — 250N000009 HC RX 250: Performed by: EMERGENCY MEDICINE

## 2022-06-26 PROCEDURE — 80048 BASIC METABOLIC PNL TOTAL CA: CPT | Performed by: EMERGENCY MEDICINE

## 2022-06-26 PROCEDURE — 85045 AUTOMATED RETICULOCYTE COUNT: CPT | Performed by: EMERGENCY MEDICINE

## 2022-06-26 PROCEDURE — 85014 HEMATOCRIT: CPT | Performed by: EMERGENCY MEDICINE

## 2022-06-26 PROCEDURE — 85007 BL SMEAR W/DIFF WBC COUNT: CPT | Performed by: EMERGENCY MEDICINE

## 2022-06-26 RX ORDER — SODIUM CHLORIDE, SODIUM LACTATE, POTASSIUM CHLORIDE, CALCIUM CHLORIDE 600; 310; 30; 20 MG/100ML; MG/100ML; MG/100ML; MG/100ML
1000 INJECTION, SOLUTION INTRAVENOUS CONTINUOUS
Status: DISCONTINUED | OUTPATIENT
Start: 2022-06-26 | End: 2022-06-26 | Stop reason: HOSPADM

## 2022-06-26 RX ORDER — KETOROLAC TROMETHAMINE 15 MG/ML
15 INJECTION, SOLUTION INTRAMUSCULAR; INTRAVENOUS ONCE
Status: COMPLETED | OUTPATIENT
Start: 2022-06-26 | End: 2022-06-26

## 2022-06-26 RX ORDER — HEPARIN SODIUM (PORCINE) LOCK FLUSH IV SOLN 100 UNIT/ML 100 UNIT/ML
5-10 SOLUTION INTRAVENOUS
Status: DISCONTINUED | OUTPATIENT
Start: 2022-06-26 | End: 2022-06-26 | Stop reason: HOSPADM

## 2022-06-26 RX ORDER — OXYCODONE HYDROCHLORIDE 10 MG/1
20 TABLET ORAL ONCE
Status: COMPLETED | OUTPATIENT
Start: 2022-06-26 | End: 2022-06-26

## 2022-06-26 RX ORDER — ONDANSETRON 2 MG/ML
8 INJECTION INTRAMUSCULAR; INTRAVENOUS EVERY 6 HOURS PRN
Status: DISCONTINUED | OUTPATIENT
Start: 2022-06-26 | End: 2022-06-26 | Stop reason: HOSPADM

## 2022-06-26 RX ADMIN — KETOROLAC TROMETHAMINE 15 MG: 15 INJECTION, SOLUTION INTRAMUSCULAR; INTRAVENOUS at 03:20

## 2022-06-26 RX ADMIN — OXYCODONE HYDROCHLORIDE 20 MG: 10 TABLET ORAL at 03:19

## 2022-06-26 RX ADMIN — ONDANSETRON 8 MG: 2 INJECTION INTRAMUSCULAR; INTRAVENOUS at 03:20

## 2022-06-26 RX ADMIN — Medication 10 ML: at 05:50

## 2022-06-26 RX ADMIN — SODIUM CHLORIDE, POTASSIUM CHLORIDE, SODIUM LACTATE AND CALCIUM CHLORIDE 1000 ML: 600; 310; 30; 20 INJECTION, SOLUTION INTRAVENOUS at 03:21

## 2022-06-26 RX ADMIN — Medication 4 MG: at 03:21

## 2022-06-26 NOTE — ED TRIAGE NOTES
Pt comes in with sickle cell pain, came out of no where, tried hot showers, heat packs, warm blankets, nothing is working.  Tried home pain meds with no relief.  Pain is generalized.

## 2022-06-26 NOTE — PROGRESS NOTES
Social Work Note: Telephone Call  Oncology Clinic     Data/Intervention:  Patient Name:  Jennifer Cervantes  /Age: 2000, 22 years old     Call From: [facility]         Reason for Call:  Transportation     Assessment:   called [employer] ([phone]) to arrange ride. [employer] arranged  for patient from home with a will call return ride.  Patient will need to call when ready for return ride home ([phone]).      Plan:  Patient is aware of the transportation plan.  available to assist with any other needs.      CARLOS Chavez,[facility]  Hematology/Oncology Social Worker  Phone:[phone] Pager: [phone]         
no exertional chest pain, no cough, and no shortness of breath.

## 2022-06-26 NOTE — DISCHARGE INSTRUCTIONS
Follow-up with your infusion clinic as possible    Return to the emergency department if your pain worsens or if you have any further concerns

## 2022-06-26 NOTE — ED PROVIDER NOTES
ED Provider Note  [facility]      History     Chief Complaint   Patient presents with     Sickle Cell Pain Crisis     HPI  Jennifer Cervantes is a 22 year old female who has past medical history of sickle cell disease, stroke, pulmonary embolism on blood thinners presenting with sickle cell pain.  Pain is reported is all over.  No change from baseline pain.  No chest pain, shortness of breath, fevers, nausea, vomiting or diarrhea.  She otherwise has been feeling well.  Unsure what set this off tonight.  No falls or injuries.  No new numbness, tingling, weakness.    Past Medical History  Past Medical History:   Diagnosis Date     Anxiety      Bleeding disorder (H)      Blood clotting disorder (H)      Cerebral infarction (H) 2015     Cognitive developmental delay     low IQ. Please recognize when managing pain and planning with her     Depressive disorder      Hemiplegia and hemiparesis following cerebral infarction affecting right dominant side (H)     right hand contractures     Iron overload due to repeated red blood cell transfusions      Migraines      Multiple subsegmental pulmonary emboli without acute cor pulmonale (H) 02/01/2021     Oppositional defiant behavior      Superficial venous thrombosis of arm, right 03/25/2021     Uncomplicated asthma      Past Surgical History:   Procedure Laterality Date     AS INSERT TUNNELED CV 2 CATH W/O PORT/PUMP       CHOLECYSTECTOMY       CV RIGHT HEART CATH MEASUREMENTS RECORDED N/A 11/18/2021    Procedure: Right Heart Cath;  Surgeon: Jackson Stauffer MD;  Location:  HEART CARDIAC CATH LAB     INSERT PORT VASCULAR ACCESS Left 4/21/2021    Procedure: INSERTION, VASCULAR ACCESS PORT (NOT SURE ON SIDE UNTIL REMOVAL);  Surgeon: Rajan Mroe MD;  Location: [facility]     IR CHEST PORT PLACEMENT > 5 YRS OF AGE  4/21/2021     IR CVC NON TUNNEL LINE REMOVAL  5/6/2021     IR CVC NON TUNNEL PLACEMENT  04/07/2020     IR CVC NON TUNNEL PLACEMENT  4/30/2021      IR PORT REMOVAL LEFT  4/21/2021     REMOVE PORT VASCULAR ACCESS Left 4/21/2021    Procedure: REMOVAL, VASCULAR ACCESS PORT LEFT;  Surgeon: Rajan More MD;  Location: [facility] OR     REPAIR TENDON ELBOW Right 10/02/2019    Procedure: Right Elbow Flexor Lengthening, Flexor Pronator Slide Of Wrist and Finger, Thumb Adductor Lengthening;  Surgeon: Anai Franco MD;  Location: [facility] OR     TONSILLECTOMY Bilateral 10/02/2019    Procedure: Bilateral Tonsillectomy;  Surgeon: Farhana Guy MD;  Location: [facility] OR     ZZC BREAST REDUCTION (INCLUDES LIPO) TIER 3 Bilateral 04/23/2019     acetaminophen (TYLENOL) 325 MG tablet  albuterol (PROAIR HFA/PROVENTIL HFA/VENTOLIN HFA) 108 (90 Base) MCG/ACT inhaler  albuterol (PROVENTIL) (2.5 MG/3ML) 0.083% neb solution  aspirin (ASA) 81 MG chewable tablet  budesonide-formoterol (SYMBICORT) 160-4.5 MCG/ACT Inhaler  diphenhydrAMINE (BENADRYL) 25 MG capsule  EPINEPHrine (ANY BX GENERIC EQUIV) 0.3 MG/0.3ML injection 2-pack  Hydroxyurea 1000 MG TABS  ondansetron (ZOFRAN) 8 MG tablet  oxyCODONE IR (ROXICODONE) 15 MG tablet      Allergies   Allergen Reactions     Contrast Dye      Hives and breathing issues     Fish-Derived Products Hives     Seafood Hives     Diagnostic X-Ray Materials      Gadolinium      Family History  Family History   Problem Relation Age of Onset     Sickle Cell Trait Mother      Hypertension Mother      Asthma Mother      Sickle Cell Trait Father      Social History   Social History     Tobacco Use     Smoking status: Never Smoker     Smokeless tobacco: Never Used   Substance Use Topics     Alcohol use: Not Currently     Alcohol/week: 0.0 standard drinks     Drug use: Never      Past medical history, past surgical history, medications, allergies, family history, and social history were reviewed with the patient. No additional pertinent items.       Review of Systems  A complete review of systems was performed with pertinent positives and negatives  noted in the HPI, and all other systems negative.    Physical Exam   BP: 130/80  Pulse: 113  Temp: 98.3  F (36.8  C)  Resp: 16  Weight: 76.7 kg (169 lb)  SpO2: (!) 88 %  Physical Exam  Physical Exam   Constitutional: oriented to person, place, and time. appears well-developed and well-nourished.   HENT:   Head: Normocephalic and atraumatic.   Neck: Normal range of motion. no nuchal rigidity.  Pulmonary/Chest: Effort normal. No respiratory distress.   Cardiac: No murmurs, rubs, gallops. RRR.  Abdominal: Abdomen soft, nontender, nondistended. No rebound tenderness.  MSK: Long bones without deformity or evidence of trauma.  No tenderness to the thoracic or lumbar spine.  No tenderness over the upper or lower extremities.  Neurological: alert and oriented to person, place, and time.   Skin: Skin is warm and dry.   Psychiatric:  normal mood and affect.  behavior is normal. Thought content normal.     ED Course      Procedures                Results for orders placed or performed during the hospital encounter of 06/26/22   Foster Draw     Status: None (In process)    Narrative    The following orders were created for panel order Foster Draw.  Procedure                               Abnormality         Status                     ---------                               -----------         ------                     Extra Blue Top Tube[[id number]]                              In process                 Extra Red Top Tube[[id number]]                               In process                 Extra Green Top (Lithium...[[id number]]                      In process                 Extra Purple Top Tube[[id number]]                            In process                   Please view results for these tests on the individual orders.     Medications   lactated ringers BOLUS 1,000 mL (has no administration in time range)   lactated ringers infusion (has no administration in time range)   ondansetron (ZOFRAN) injection 8 mg (has no  administration in time range)   ketamine (KETALAR) injection 4 mg (has no administration in time range)   oxyCODONE IR (ROXICODONE) tablet 20 mg (has no administration in time range)   ketorolac (TORADOL) injection 15 mg (has no administration in time range)        Assessments & Plan (with Medical Decision Making)   MDM   patient presenting with sickle cell pain crisis.  She has no symptoms or signs of acute chest syndrome.  Initial oxygen saturation mildly low, she is placed in some on oxygen for short time.  This was weaned off and she is saturating 97% on room air without symptoms of shortness of breath, chest pain etc.  Very unlikely pulmonary embolism as she is on blood thinners.  She is given her pain medications, pain is relieved at this point and she is ready for discharge.  She has no further complaints.  No history of trauma to suggest traumatic injury, no need for imaging at this point.    I have reviewed the nursing notes. I have reviewed the findings, diagnosis, plan and need for follow up with the patient.    New Prescriptions    No medications on file       Final diagnoses:   Sickle cell pain crisis (H)       --  Andrew Chou  [facility] EMERGENCY DEPARTMENT  6/26/2022     Andrew Chou MD  06/26/22 0575

## 2022-06-27 ENCOUNTER — PATIENT OUTREACH (OUTPATIENT)
Dept: CARE COORDINATION | Facility: CLINIC | Age: 22
End: 2022-06-27

## 2022-06-27 ENCOUNTER — INFUSION THERAPY VISIT (OUTPATIENT)
Dept: TRANSPLANT | Facility: CLINIC | Age: 22
End: 2022-06-27
Attending: PEDIATRICS
Payer: COMMERCIAL

## 2022-06-27 ENCOUNTER — PATIENT OUTREACH (OUTPATIENT)
Dept: ONCOLOGY | Facility: CLINIC | Age: 22
End: 2022-06-27

## 2022-06-27 ENCOUNTER — TELEPHONE (OUTPATIENT)
Dept: ONCOLOGY | Facility: CLINIC | Age: 22
End: 2022-06-27

## 2022-06-27 VITALS
SYSTOLIC BLOOD PRESSURE: 128 MMHG | OXYGEN SATURATION: 92 % | DIASTOLIC BLOOD PRESSURE: 85 MMHG | HEART RATE: 107 BPM | TEMPERATURE: 98.3 F | RESPIRATION RATE: 18 BRPM

## 2022-06-27 DIAGNOSIS — D57.00 SICKLE CELL PAIN CRISIS (H): ICD-10-CM

## 2022-06-27 DIAGNOSIS — G81.10 SPASTIC HEMIPLEGIA, UNSPECIFIED ETIOLOGY, UNSPECIFIED LATERALITY (H): Primary | ICD-10-CM

## 2022-06-27 PROCEDURE — 258N000003 HC RX IP 258 OP 636: Performed by: PEDIATRICS

## 2022-06-27 PROCEDURE — 250N000011 HC RX IP 250 OP 636: Performed by: PEDIATRICS

## 2022-06-27 PROCEDURE — 96374 THER/PROPH/DIAG INJ IV PUSH: CPT

## 2022-06-27 PROCEDURE — 96361 HYDRATE IV INFUSION ADD-ON: CPT

## 2022-06-27 PROCEDURE — 96376 TX/PRO/DX INJ SAME DRUG ADON: CPT

## 2022-06-27 RX ORDER — HEPARIN SODIUM (PORCINE) LOCK FLUSH IV SOLN 100 UNIT/ML 100 UNIT/ML
5 SOLUTION INTRAVENOUS
Status: DISCONTINUED | OUTPATIENT
Start: 2022-06-27 | End: 2022-06-27 | Stop reason: HOSPADM

## 2022-06-27 RX ORDER — DIPHENHYDRAMINE HCL 25 MG
25 CAPSULE ORAL
Status: CANCELLED
Start: 2022-07-01

## 2022-06-27 RX ORDER — DIPHENHYDRAMINE HCL 25 MG
25 CAPSULE ORAL
Status: DISCONTINUED | OUTPATIENT
Start: 2022-06-27 | End: 2022-06-27 | Stop reason: HOSPADM

## 2022-06-27 RX ORDER — MORPHINE SULFATE 2 MG/ML
2 INJECTION, SOLUTION INTRAMUSCULAR; INTRAVENOUS
Status: DISCONTINUED | OUTPATIENT
Start: 2022-06-27 | End: 2022-06-27 | Stop reason: HOSPADM

## 2022-06-27 RX ORDER — HEPARIN SODIUM (PORCINE) LOCK FLUSH IV SOLN 100 UNIT/ML 100 UNIT/ML
5 SOLUTION INTRAVENOUS
Status: CANCELLED | OUTPATIENT
Start: 2022-07-01

## 2022-06-27 RX ORDER — HEPARIN SODIUM,PORCINE 10 UNIT/ML
5 VIAL (ML) INTRAVENOUS
Status: CANCELLED | OUTPATIENT
Start: 2022-07-01

## 2022-06-27 RX ORDER — ONDANSETRON 8 MG/1
8 TABLET, FILM COATED ORAL
Status: CANCELLED
Start: 2022-07-01

## 2022-06-27 RX ORDER — MORPHINE SULFATE 2 MG/ML
2 INJECTION, SOLUTION INTRAMUSCULAR; INTRAVENOUS
Status: CANCELLED
Start: 2022-07-01

## 2022-06-27 RX ADMIN — DEXTROSE AND SODIUM CHLORIDE 1000 ML: 5; 450 INJECTION, SOLUTION INTRAVENOUS at 09:04

## 2022-06-27 RX ADMIN — Medication 5 ML: at 11:07

## 2022-06-27 RX ADMIN — MORPHINE SULFATE 2 MG: 2 INJECTION, SOLUTION INTRAMUSCULAR; INTRAVENOUS at 10:07

## 2022-06-27 RX ADMIN — MORPHINE SULFATE 2 MG: 2 INJECTION, SOLUTION INTRAMUSCULAR; INTRAVENOUS at 11:05

## 2022-06-27 RX ADMIN — MORPHINE SULFATE 2 MG: 2 INJECTION, SOLUTION INTRAMUSCULAR; INTRAVENOUS at 09:07

## 2022-06-27 ASSESSMENT — PAIN SCALES - GENERAL: PAINLEVEL: EXTREME PAIN (9)

## 2022-06-27 NOTE — LETTER
6/27/2022         RE: Jennifer Cervantes  [address]  [facility] [address]        Dear Colleague,    Thank you for referring your patient, Jennifer Cervantes, to the [facility] BLOOD AND MARROW TRANSPLANT PROGRAM [place]. Please see a copy of my visit note below.    Infusion Nursing Note:  Jennifer Cervantes presents today for IVF and pain meds.    Patient seen by provider today: No   present during visit today: Not Applicable.    Note: No labs. Pt presents with 9/10 generalized pain. Port accessed, pt received total 6mg IV morphine, 1L D51/2NS. Tolerated infusion without side effects or symptoms. Pain on discharge was 5/10. Of note, pt was mildly hypoxic 86-88% on arrival, recovered quickly to mid 90s on room air. Denies any CP or SOB.    Intravenous Access:  Implanted Port.    Treatment Conditions:  Not Applicable.    Post Infusion Assessment:  Patient tolerated infusion without incident.  Blood return noted pre and post infusion.  No evidence of extravasations.  Access discontinued per protocol.     Discharge Plan:   Patient discharged in stable condition accompanied by: self.  Departure Mode: Ambulatory.      Allison Bowman RN                          Again, thank you for allowing me to participate in the care of your patient.        Sincerely,        [facility]

## 2022-06-27 NOTE — PROGRESS NOTES
Social Work Note: Telephone Call  Oncology Clinic     Data/Intervention:  Patient Name:  Jennifer Cervantes  /Age: 2000     Call From: Jennifer Cervantes        Reason for Call:  Transportation (Will Call)     Assessment:  SW received phone call from patient requesting assistance with will call ride home. Patient is currently in infusion and has about an hour left. Patient does not have ride home and asked SW to assist with a ride home. SW arranged will call ride through [employer] ([phone]).  Patient will need to call when ready for return ride home ([phone]).      Plan:  Patient is aware of the transportation plan.  available to assist with any other needs.      CARLOS Chavez,LGSW  Hematology/Oncology Social Worker  Phone:[phone] Pager: [phone]          [Follow-Up: _____] : a [unfilled] follow-up visit

## 2022-06-27 NOTE — PROGRESS NOTES
RN Care Coordination Note    POST HOSPITAL DISCHARGE CALL    Discharge date:    ED visit on 6/26/22.  Pt called to clinic to request IV fluids and pain meds earlier this morning. Infusion appt at 9:00    Is scheduled for follow up with Dr. Duncan on 7/11/22.        Isabelle Adams RN, OCN  Care Coordinator  [facility]

## 2022-06-27 NOTE — PROGRESS NOTES
Social Work Note: Telephone Call  Oncology Clinic     Data/Intervention:  Patient Name:  Jennifer Cervantes  /Age: 2000, 22 years old     Call From: [facility]        Reason for Call:  Transportation     Assessment:   called [employer] ([phone]) to arrange ride. [employer] arranged  for patient from home with a will call ride.  Patient will need to call when ready for return ride home ([phone]).      Plan:  Patient is aware of the transportation plan.  available to assist with any other needs.      CARLOS Chavez,[facility]  Hematology/Oncology Social Worker  Phone:[phone] Pager: [phone]

## 2022-06-27 NOTE — PROGRESS NOTES
Infusion Nursing Note:  Jennifer Cervantes presents today for IVF and pain meds.    Patient seen by provider today: No   present during visit today: Not Applicable.    Note: No labs. Pt presents with 9/10 generalized pain. Port accessed, pt received total 6mg IV morphine, 1L D51/2NS. Tolerated infusion without side effects or symptoms. Pain on discharge was 5/10. Of note, pt was mildly hypoxic 86-88% on arrival, recovered quickly to mid 90s on room air. Denies any CP or SOB.    Intravenous Access:  Implanted Port.    Treatment Conditions:  Not Applicable.    Post Infusion Assessment:  Patient tolerated infusion without incident.  Blood return noted pre and post infusion.  No evidence of extravasations.  Access discontinued per protocol.     Discharge Plan:   Patient discharged in stable condition accompanied by: self.  Departure Mode: Ambulatory.      Allison Bowman RN

## 2022-06-27 NOTE — PROGRESS NOTES
This is a recent snapshot of the patient's [facility] medical record.  For current drug dose and complete information and questions, call [phone] or In Basket pool, fv home infusion ([id number])  CSN Number:  [id number]

## 2022-06-27 NOTE — TELEPHONE ENCOUNTER
Pt called in to triage requesting IVF pain meds for generalized pain rated 9/10 x 1 days. Stated last took prn oxycodone at 0600 this morning without relief. Denied any fevers, chills, cough, sob, chest pain, numbness or tingling or other symptoms not typical of sickle cell pain.     Pt's last infusion was 6/24/22, last clinic visit 6/6/22 with follow-up on 7/11/22 with Dr. Duncan.     Patient states needs ride, 25 minutes away.    Pt denied being out of home medications and needing any refills today.     Pt qualifies for sickle cell standing order protocol.    If you do not hear from the infusion center by 2pm then you will not be able to get in for an infusion today.     Added to Infusion Wait List    BMT can offer apt to Jennifer at 0900  Called Jennifer. She confirmed she can come.  Will contact sw at 0800 to assist with ride.  Updated BMT charge nurse.   Sent message to [facility] to schedule and SW for assistance with transportation.     Message Routed to  Dr. Duncan and Arely Krueger, JOECC

## 2022-07-01 ENCOUNTER — TELEPHONE (OUTPATIENT)
Dept: OCCUPATIONAL THERAPY | Facility: CLINIC | Age: 22
End: 2022-07-01

## 2022-07-01 ENCOUNTER — PATIENT OUTREACH (OUTPATIENT)
Dept: ONCOLOGY | Facility: CLINIC | Age: 22
End: 2022-07-01

## 2022-07-01 DIAGNOSIS — D57.00 SICKLE CELL PAIN CRISIS (H): ICD-10-CM

## 2022-07-01 RX ORDER — OXYCODONE HYDROCHLORIDE 15 MG/1
15 TABLET ORAL EVERY 4 HOURS PRN
Qty: 45 TABLET | Refills: 0 | Status: SHIPPED | OUTPATIENT
Start: 2022-07-01 | End: 2022-07-08

## 2022-07-01 NOTE — TELEPHONE ENCOUNTER
Jennifer is calling to report that she will be going to the ED.  She is requesting to speak with Arely because a while ago she had spoken with Dr. Duncan about going to the ED and getting two doses of ketamine while in the ED. Currently, she only gets one dose. Would like to see if she can get two doses today.    Will route to Dr. Duncan and Arely Krueger, JOECC for follow up with Jennifer.

## 2022-07-01 NOTE — TELEPHONE ENCOUNTER
Pt called to check if she can come in today for infusion.      Writer informed pt she is on the list and will call once spot available      Advised pt if you do not hear from the clinic by 2PM to seek ED treatment if still in pain

## 2022-07-01 NOTE — TELEPHONE ENCOUNTER
Pt called in to triage requesting IVF pain meds for all over pain rated 10/10 since 3 p.m. yesterday. Stated last took prn oxy at 0600 this morning without relief. Denied any fevers, chills, cough, sob, chest pain, numbness or tingling or other symptoms not typical of sickle cell pain.   Pt's last infusion was 6/27/22, last clinic visit 6/6/22 with follow-up on 7/11/22 with Dr. Duncan.   Patient states she needs a ride to clinic, about 25 minutes from clinic.   Pt requesting oxy refill today. Pended up in separate encounter.  Pt qualifies for sickle cell standing order protocol.  If you do not hear from the infusion center by 2pm then you will not be able to get in for an infusion today.   Added to Infusion Wait list.

## 2022-07-01 NOTE — TELEPHONE ENCOUNTER
Narcotic Refill Request    Medication(s) requested:  Oxycodone 15 mg tablet  Person Requesting Refill: Jennifer  What pain is the medication treating: sickle cell pain  How is the medication being taken? q4h  Does pt have enough for today? yes  Is pain being adequately controlled on the current regimen?: yes  Experiencing any side effects from medication?: no    Date of most recent appointment:  6/6/22  Any No Show Visits:no  Next appointment:   7/11  Last fill date and by whom:  6/24 by Dr. Duncan   Reviewed: No    Routed/Paged provider: Pended and Routed to Dr. Duncan.

## 2022-07-01 NOTE — PROGRESS NOTES
[facility]: Cancer Care                                                                                          Per triage, patient was unable to get into infusion today for pain management.  Patient on her way to the ED and is requesting 2 doses of ketamine instead of one.  Writer contacted Dr. Duncan and he is in agreement with the 2 doses.  He requested writer update patient's care plan for the ED in her chart.  Writer updated plan to reflect his wishes of her getting 2 doses of ketamine instead of one, but he did state ultimately it was up to the ED MD.  Left message for patient on her VM to let her know of the changes.       Signature:  Arely Krueger RN

## 2022-07-02 ENCOUNTER — HOSPITAL ENCOUNTER (EMERGENCY)
Facility: CLINIC | Age: 22
Discharge: HOME | End: 2022-07-02
Payer: COMMERCIAL

## 2022-07-05 ENCOUNTER — TELEPHONE (OUTPATIENT)
Dept: ONCOLOGY | Facility: CLINIC | Age: 22
End: 2022-07-05

## 2022-07-05 ENCOUNTER — PATIENT OUTREACH (OUTPATIENT)
Dept: CARE COORDINATION | Facility: CLINIC | Age: 22
End: 2022-07-05

## 2022-07-05 ENCOUNTER — INFUSION THERAPY VISIT (OUTPATIENT)
Dept: TRANSPLANT | Facility: CLINIC | Age: 22
End: 2022-07-05
Attending: PEDIATRICS
Payer: COMMERCIAL

## 2022-07-05 VITALS
SYSTOLIC BLOOD PRESSURE: 148 MMHG | TEMPERATURE: 97.8 F | DIASTOLIC BLOOD PRESSURE: 89 MMHG | OXYGEN SATURATION: 98 % | RESPIRATION RATE: 16 BRPM | HEART RATE: 104 BPM

## 2022-07-05 DIAGNOSIS — G81.10 SPASTIC HEMIPLEGIA, UNSPECIFIED ETIOLOGY, UNSPECIFIED LATERALITY (H): Primary | ICD-10-CM

## 2022-07-05 DIAGNOSIS — D57.00 SICKLE CELL PAIN CRISIS (H): ICD-10-CM

## 2022-07-05 PROCEDURE — 96365 THER/PROPH/DIAG IV INF INIT: CPT

## 2022-07-05 PROCEDURE — 250N000011 HC RX IP 250 OP 636: Performed by: PEDIATRICS

## 2022-07-05 PROCEDURE — 258N000003 HC RX IP 258 OP 636: Performed by: PEDIATRICS

## 2022-07-05 PROCEDURE — 96376 TX/PRO/DX INJ SAME DRUG ADON: CPT

## 2022-07-05 PROCEDURE — 96375 TX/PRO/DX INJ NEW DRUG ADDON: CPT

## 2022-07-05 RX ORDER — ONDANSETRON 8 MG/1
8 TABLET, FILM COATED ORAL
Status: CANCELLED
Start: 2022-10-01

## 2022-07-05 RX ORDER — MORPHINE SULFATE 2 MG/ML
2 INJECTION, SOLUTION INTRAMUSCULAR; INTRAVENOUS
Status: CANCELLED
Start: 2022-10-01

## 2022-07-05 RX ORDER — HEPARIN SODIUM,PORCINE 10 UNIT/ML
5 VIAL (ML) INTRAVENOUS
Status: CANCELLED | OUTPATIENT
Start: 2022-10-01

## 2022-07-05 RX ORDER — DIPHENHYDRAMINE HCL 25 MG
25 CAPSULE ORAL
Status: CANCELLED
Start: 2022-10-01

## 2022-07-05 RX ORDER — HEPARIN SODIUM (PORCINE) LOCK FLUSH IV SOLN 100 UNIT/ML 100 UNIT/ML
5 SOLUTION INTRAVENOUS
Status: CANCELLED | OUTPATIENT
Start: 2022-10-01

## 2022-07-05 RX ORDER — MORPHINE SULFATE 2 MG/ML
2 INJECTION, SOLUTION INTRAMUSCULAR; INTRAVENOUS
Status: DISCONTINUED | OUTPATIENT
Start: 2022-07-05 | End: 2022-07-05 | Stop reason: HOSPADM

## 2022-07-05 RX ADMIN — DEXTROSE AND SODIUM CHLORIDE 1000 ML: 5; 450 INJECTION, SOLUTION INTRAVENOUS at 12:56

## 2022-07-05 RX ADMIN — MORPHINE SULFATE 2 MG: 2 INJECTION, SOLUTION INTRAMUSCULAR; INTRAVENOUS at 14:05

## 2022-07-05 RX ADMIN — MORPHINE SULFATE 2 MG: 2 INJECTION, SOLUTION INTRAMUSCULAR; INTRAVENOUS at 13:05

## 2022-07-05 ASSESSMENT — PAIN SCALES - GENERAL: PAINLEVEL: EXTREME PAIN (8)

## 2022-07-05 NOTE — LETTER
7/5/2022         RE: Jennifer Cervantes  [address]  [facility] [address]        Dear Colleague,    Thank you for referring your patient, Jennifer Cervantes, to the [facility] BLOOD AND MARROW TRANSPLANT PROGRAM [place]. Please see a copy of my visit note below.    Infusion Nursing Note:  Jennifer Cervantes presents today for add on IVF and pain meds.    Patient seen by provider today: No   present during visit today: Not Applicable.    Note: No labs/no provider. 1L D5 1/2NS given over 2 hours. 2mg IVP Morphine given Q1HR for 3 doses totaling 6mg.  Infusion completed without complication.    Intravenous Access:  Implanted Port.  De-accessed prior to discharge.    Treatment Conditions:  Not Applicable.    Post Infusion Assessment:  Patient tolerated infusion without incident.     Discharge Plan:   Patient discharged in stable condition accompanied by: self.      Allison Wiggins RN                          Again, thank you for allowing me to participate in the care of your patient.        Sincerely,        [facility]

## 2022-07-05 NOTE — PROGRESS NOTES
Infusion Nursing Note:  Jennifer Cervantes presents today for add on IVF and pain meds.    Patient seen by provider today: No   present during visit today: Not Applicable.    Note: No labs/no provider. 1L D5 1/2NS given over 2 hours. 2mg IVP Morphine given Q1HR for 3 doses totaling 6mg.  Infusion completed without complication.    Intravenous Access:  Implanted Port.  De-accessed prior to discharge.    Treatment Conditions:  Not Applicable.    Post Infusion Assessment:  Patient tolerated infusion without incident.     Discharge Plan:   Patient discharged in stable condition accompanied by: self.      Allison Wiggins RN

## 2022-07-05 NOTE — TELEPHONE ENCOUNTER
Pt called in to triage requesting IVF pain meds for all over  pain rated 9/10 x3 days. Stated last took prn oxycodone at 6am this morning without relief. Denied any fevers, chills, cough, sob, chest pain, numbness or tingling or other symptoms not typical of sickle cell pain.   Pt's last infusion was 6/27/22, last clinic visit 6/6/22 Dr Duncan  with follow-up on 7/11/22 with Dr Duncan.   Patient states they do not have own ride and it will take 60 minutes long to get to cancer clinic.   Pt denied being out of home medications and needing any refills today.   Pt qualifies for sickle cell standing order protocol.  If you do not hear from the infusion center by 2pm then you will not be able to get in for an infusion today.   Please note, if you are late for your appt, you risk losing your infusion appt as it may delay another patient's infusion who arrived on time.   7:25 BMT can take Jennifer at 1:00 pm   Call placed to Jennifer and she confirms she can come in at 1:00 pm     Message sent to [employer] work to arrange transportation     Message sent to scheduling to schedule appt

## 2022-07-05 NOTE — PROGRESS NOTES
Social Work Note: Telephone Call  Oncology Clinic     Data/Intervention:  Patient Name:  Jennifer Cervantes  /Age: 2000, 22 years old     Call From: [facility]        Reason for Call:  Transportation      Assessment:   called [employer] to arrange ride through patient's insurance. [employer] arranged  for patient from home with [employer] ([phone]).  Patient will need to call when ready for return ride home ([phone]).      Plan:  Patient is aware of the transportation plan.  available to assist with any other needs.      CARLOS Chavez,LGSW  Hematology/Oncology Social Worker  Phone:[phone] Pager: [phone]

## 2022-07-06 ENCOUNTER — OFFICE VISIT (OUTPATIENT)
Dept: OPHTHALMOLOGY | Facility: CLINIC | Age: 22
End: 2022-07-06
Attending: PEDIATRICS
Payer: COMMERCIAL

## 2022-07-06 DIAGNOSIS — D57.09 SICKLE CELL DISEASE WITH CRISIS AND OTHER COMPLICATION (H): ICD-10-CM

## 2022-07-06 DIAGNOSIS — H52.4 ACCOMMODATIVE INSUFFICIENCY: ICD-10-CM

## 2022-07-06 DIAGNOSIS — E83.111 HEMOCHROMATOSIS DUE TO REPEATED RED BLOOD CELL TRANSFUSIONS: Primary | ICD-10-CM

## 2022-07-06 PROCEDURE — 92015 DETERMINE REFRACTIVE STATE: CPT | Performed by: OPTOMETRIST

## 2022-07-06 PROCEDURE — 92014 COMPRE OPH EXAM EST PT 1/>: CPT | Performed by: OPTOMETRIST

## 2022-07-06 ASSESSMENT — REFRACTION_MANIFEST
OD_SPHERE: +0.75
OS_SPHERE: +0.75
OS_ADD: +1.00
OD_CYLINDER: +0.25
OD_AXIS: 135
OD_ADD: +1.00
OS_CYLINDER: SPHERE

## 2022-07-06 ASSESSMENT — TONOMETRY
OD_IOP_MMHG: 21
OS_IOP_MMHG: 17
IOP_METHOD: TONOPEN

## 2022-07-06 ASSESSMENT — CONF VISUAL FIELD
OS_NORMAL: 1
OD_NORMAL: 1
METHOD: COUNTING FINGERS

## 2022-07-06 ASSESSMENT — EXTERNAL EXAM - LEFT EYE: OS_EXAM: NORMAL

## 2022-07-06 ASSESSMENT — CUP TO DISC RATIO
OS_RATIO: 0.4
OD_RATIO: 0.4

## 2022-07-06 ASSESSMENT — VISUAL ACUITY
METHOD: SNELLEN - LINEAR
OD_SC: 20/20
OS_SC: 20/25

## 2022-07-06 ASSESSMENT — REFRACTION_WEARINGRX
SPECS_TYPE: SVL
OS_CYLINDER: SPHERE
OD_SPHERE: +0.75
OS_SPHERE: +0.25
OD_CYLINDER: SPHERE

## 2022-07-06 ASSESSMENT — SLIT LAMP EXAM - LIDS
COMMENTS: NORMAL
COMMENTS: NORMAL

## 2022-07-06 ASSESSMENT — EXTERNAL EXAM - RIGHT EYE: OD_EXAM: NORMAL

## 2022-07-06 NOTE — PROGRESS NOTES
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 Return to clinic with new flashes, floaters, or curtain over vision.     (D57.09) Sickle cell disease with crisis and other complication (H)  Plan: See above note.     (H52.4) Accommodative insufficiency  Plan: REFRACTION [[id number]]        SRx updated and released for near only. Could also consider progressive lens if removing near glasses becomes cumbersome.       Complete documentation of historical and exam elements from today's encounter can  be found in the full encounter summary report (not reduplicated in this progress  note). I personally obtained the chief complaint(s) and history of present illness. I  confirmed and edited as necessary the review of systems, past medical/surgical  history, family history, social history, and examination findings as documented by  others; and I examined the patient myself. I personally reviewed the relevant tests,  images, and reports as documented above. I formulated and edited as necessary the  assessment and plan and discussed the findings and management plan with the  patient and family.    Usama Mcclendon OD

## 2022-07-06 NOTE — NURSING NOTE
Chief Complaints and History of Present Illnesses   Patient presents with     COMPREHENSIVE EYE EXAM     Chief Complaint(s) and History of Present Illness(es)     COMPREHENSIVE EYE EXAM     Laterality: both eyes    Course: gradually worsening    Associated symptoms: Negative for dryness, eye pain, redness and headache    Treatments tried: no treatments    Pain scale: 0/10              Comments     She states that she struggles to read her phone.  Some days she can read her phone without any trouble and other days, she can not see the words on the screen even when she is wearing her glasses.  Patient denies having any eye discomfort.      Roz Roper, SUSAN 8:27 AM  July 6, 2022

## 2022-07-08 ENCOUNTER — TELEPHONE (OUTPATIENT)
Dept: ONCOLOGY | Facility: CLINIC | Age: 22
End: 2022-07-08

## 2022-07-08 ENCOUNTER — INFUSION THERAPY VISIT (OUTPATIENT)
Dept: TRANSPLANT | Facility: CLINIC | Age: 22
End: 2022-07-08
Attending: PEDIATRICS
Payer: COMMERCIAL

## 2022-07-08 VITALS
SYSTOLIC BLOOD PRESSURE: 132 MMHG | RESPIRATION RATE: 16 BRPM | OXYGEN SATURATION: 98 % | DIASTOLIC BLOOD PRESSURE: 84 MMHG | HEART RATE: 112 BPM | TEMPERATURE: 98 F

## 2022-07-08 DIAGNOSIS — G81.10 SPASTIC HEMIPLEGIA, UNSPECIFIED ETIOLOGY, UNSPECIFIED LATERALITY (H): Primary | ICD-10-CM

## 2022-07-08 DIAGNOSIS — D57.00 SICKLE CELL PAIN CRISIS (H): ICD-10-CM

## 2022-07-08 PROCEDURE — 96375 TX/PRO/DX INJ NEW DRUG ADDON: CPT

## 2022-07-08 PROCEDURE — 258N000003 HC RX IP 258 OP 636: Performed by: PEDIATRICS

## 2022-07-08 PROCEDURE — 96376 TX/PRO/DX INJ SAME DRUG ADON: CPT

## 2022-07-08 PROCEDURE — 96365 THER/PROPH/DIAG IV INF INIT: CPT

## 2022-07-08 PROCEDURE — 250N000013 HC RX MED GY IP 250 OP 250 PS 637: Performed by: PEDIATRICS

## 2022-07-08 PROCEDURE — 96366 THER/PROPH/DIAG IV INF ADDON: CPT

## 2022-07-08 PROCEDURE — 250N000011 HC RX IP 250 OP 636: Performed by: PEDIATRICS

## 2022-07-08 RX ORDER — DIPHENHYDRAMINE HCL 25 MG
25 CAPSULE ORAL
Status: COMPLETED | OUTPATIENT
Start: 2022-07-08 | End: 2022-07-08

## 2022-07-08 RX ORDER — DIPHENHYDRAMINE HCL 25 MG
25 CAPSULE ORAL
Status: CANCELLED
Start: 2022-10-01

## 2022-07-08 RX ORDER — HEPARIN SODIUM,PORCINE 10 UNIT/ML
5 VIAL (ML) INTRAVENOUS
Status: CANCELLED | OUTPATIENT
Start: 2022-10-01

## 2022-07-08 RX ORDER — ONDANSETRON 8 MG/1
8 TABLET, ORALLY DISINTEGRATING ORAL
Status: DISCONTINUED | OUTPATIENT
Start: 2022-07-08 | End: 2022-07-08 | Stop reason: HOSPADM

## 2022-07-08 RX ORDER — HEPARIN SODIUM (PORCINE) LOCK FLUSH IV SOLN 100 UNIT/ML 100 UNIT/ML
5 SOLUTION INTRAVENOUS ONCE
Status: COMPLETED | OUTPATIENT
Start: 2022-07-08 | End: 2022-07-08

## 2022-07-08 RX ORDER — MORPHINE SULFATE 2 MG/ML
2 INJECTION, SOLUTION INTRAMUSCULAR; INTRAVENOUS
Status: DISCONTINUED | OUTPATIENT
Start: 2022-07-08 | End: 2022-07-08 | Stop reason: HOSPADM

## 2022-07-08 RX ORDER — OXYCODONE HYDROCHLORIDE 15 MG/1
15 TABLET ORAL EVERY 4 HOURS PRN
Qty: 45 TABLET | Refills: 0 | Status: SHIPPED | OUTPATIENT
Start: 2022-07-08 | End: 2022-07-14

## 2022-07-08 RX ORDER — HEPARIN SODIUM (PORCINE) LOCK FLUSH IV SOLN 100 UNIT/ML 100 UNIT/ML
5 SOLUTION INTRAVENOUS
Status: CANCELLED | OUTPATIENT
Start: 2022-10-01

## 2022-07-08 RX ORDER — ONDANSETRON 8 MG/1
8 TABLET, FILM COATED ORAL
Status: CANCELLED
Start: 2022-10-01

## 2022-07-08 RX ORDER — MORPHINE SULFATE 2 MG/ML
2 INJECTION, SOLUTION INTRAMUSCULAR; INTRAVENOUS
Status: CANCELLED
Start: 2022-10-01

## 2022-07-08 RX ADMIN — MORPHINE SULFATE 2 MG: 2 INJECTION, SOLUTION INTRAMUSCULAR; INTRAVENOUS at 14:42

## 2022-07-08 RX ADMIN — DIPHENHYDRAMINE HYDROCHLORIDE 25 MG: 25 CAPSULE ORAL at 13:26

## 2022-07-08 RX ADMIN — MORPHINE SULFATE 2 MG: 2 INJECTION, SOLUTION INTRAMUSCULAR; INTRAVENOUS at 12:42

## 2022-07-08 RX ADMIN — DEXTROSE AND SODIUM CHLORIDE 1000 ML: 5; 450 INJECTION, SOLUTION INTRAVENOUS at 12:30

## 2022-07-08 RX ADMIN — MORPHINE SULFATE 2 MG: 2 INJECTION, SOLUTION INTRAMUSCULAR; INTRAVENOUS at 13:46

## 2022-07-08 RX ADMIN — SODIUM CHLORIDE, PRESERVATIVE FREE 5 ML: 5 INJECTION INTRAVENOUS at 14:53

## 2022-07-08 NOTE — TELEPHONE ENCOUNTER
1148 appt became available for Jennifer at 12:00pm but pt would have to leave now.    1150: Pt is able to get mom to help arrange transportation for immediate IVF/Pain appt now.     1155 scheduling request sent

## 2022-07-08 NOTE — TELEPHONE ENCOUNTER
Pt called in to triage requesting IVF pain meds for lower back pain rated 8/10 x 2 days. Stated last took prn oxycodone @ 6am this morning without relief. Denied any fevers, chills, cough, sob, chest pain, numbness or tingling or other symptoms not typical of sickle cell pain.   Pt's last infusion was 7/5/22, last clinic visit 6/6/22 Dr Duncan with follow-up on 7/11/22 with DR Duncan.   Patient states they do not have own ride and it will take 60 minutes long to get to cancer clinic.   Pt denied being out of home medications and needing any refills today.   Pt qualifies for sickle cell standing order protocol.  If you do not hear from the infusion center by 2pm then you will not be able to get in for an infusion today.   Please note, if you are late for your appt, you risk losing your infusion appt as it may delay another patient's infusion who arrived on time.

## 2022-07-08 NOTE — PROGRESS NOTES
Infusion Nursing Note:  Jennifer Cervantes presents today for add-on infusion.    Patient seen by provider today: No   present during visit today: Not Applicable.    Note: Patient received 1 L D5NaCl bolus over 2 hours, morphine x3 and benadryl per generic infusion plan. Patient arrived with 8/10 low back pain. Pain improved to 6/10. Stable upon discharge.    Intravenous Access:  Implanted Port.    Treatment Conditions:  Results reviewed, labs MET treatment parameters, ok to proceed with treatment.    Post Infusion Assessment:  Patient tolerated infusion without incident.     Discharge Plan:   Patient and/or family verbalized understanding of discharge instructions and all questions answered.      Vicenta Boone RN

## 2022-07-08 NOTE — LETTER
7/8/2022         RE: Jennifer Cervantes  [address]  [facility] [address]        Dear Colleague,    Thank you for referring your patient, Jennifer Cervantes, to the [facility] BLOOD AND MARROW TRANSPLANT PROGRAM [place]. Please see a copy of my visit note below.    Infusion Nursing Note:  Jennifer Cervantes presents today for add-on infusion.    Patient seen by provider today: No   present during visit today: Not Applicable.    Note: Patient received 1 L D5NaCl bolus over 2 hours, morphine x3 and benadryl per generic infusion plan. Patient arrived with 8/10 low back pain. Pain improved to 6/10. Stable upon discharge.    Intravenous Access:  Implanted Port.    Treatment Conditions:  Results reviewed, labs MET treatment parameters, ok to proceed with treatment.    Post Infusion Assessment:  Patient tolerated infusion without incident.     Discharge Plan:   Patient and/or family verbalized understanding of discharge instructions and all questions answered.      Vicenta Boone RN                          Again, thank you for allowing me to participate in the care of your patient.        Sincerely,        [facility]

## 2022-07-08 NOTE — TELEPHONE ENCOUNTER
Narcotic Refill Request    Medication(s) requested:  Oxycodone   Person Requesting Refill:Jennifer  What pain is the medication treating: Sickle cell pain   How is the medication being taken?:Takes 1 tablet every 4 hours -6 tablets a day  Does pt have enough for today? Yes   Is pain being adequately controlled on the current regimen?: Yes   Experiencing any side effects from medication?: No     Date of most recent appointment:  6/6/22   Any No Show Visits:no   Next appointment:   7/11/22 Dr Duncan   Last fill date and by whom:  7/1/22 Dr Duncan    Reviewed: No access     Routed provider: DR Duncan , Has enough medication until refill date of 7/10/22

## 2022-07-08 NOTE — TELEPHONE ENCOUNTER
1123 Jennifer Cervantes called checking status of possible IVF/Pain infusion appt availability.   As of 1124 no appts have become available.     Instructed pt to go to ED if needed.

## 2022-07-11 ENCOUNTER — ONCOLOGY VISIT (OUTPATIENT)
Dept: ONCOLOGY | Facility: CLINIC | Age: 22
End: 2022-07-11
Attending: PEDIATRICS
Payer: COMMERCIAL

## 2022-07-11 ENCOUNTER — APPOINTMENT (OUTPATIENT)
Dept: LAB | Facility: CLINIC | Age: 22
End: 2022-07-11
Payer: COMMERCIAL

## 2022-07-11 ENCOUNTER — INFUSION THERAPY VISIT (OUTPATIENT)
Dept: ONCOLOGY | Facility: CLINIC | Age: 22
End: 2022-07-11
Payer: COMMERCIAL

## 2022-07-11 ENCOUNTER — TELEPHONE (OUTPATIENT)
Dept: ONCOLOGY | Facility: CLINIC | Age: 22
End: 2022-07-11

## 2022-07-11 VITALS
BODY MASS INDEX: 29.09 KG/M2 | RESPIRATION RATE: 16 BRPM | DIASTOLIC BLOOD PRESSURE: 75 MMHG | SYSTOLIC BLOOD PRESSURE: 128 MMHG | HEART RATE: 104 BPM | TEMPERATURE: 98 F | WEIGHT: 169.5 LBS | OXYGEN SATURATION: 100 %

## 2022-07-11 DIAGNOSIS — I82.611 SUPERFICIAL VENOUS THROMBOSIS OF ARM, RIGHT: ICD-10-CM

## 2022-07-11 DIAGNOSIS — D57.1 HB-SS DISEASE WITHOUT CRISIS (H): Primary | ICD-10-CM

## 2022-07-11 DIAGNOSIS — H90.3 SENSORINEURAL HEARING LOSS, BILATERAL: ICD-10-CM

## 2022-07-11 DIAGNOSIS — G81.10 SPASTIC HEMIPLEGIA, UNSPECIFIED ETIOLOGY, UNSPECIFIED LATERALITY (H): Primary | ICD-10-CM

## 2022-07-11 DIAGNOSIS — J45.909 ASTHMATIC BRONCHITIS WITHOUT COMPLICATION, UNSPECIFIED ASTHMA SEVERITY, UNSPECIFIED WHETHER PERSISTENT: ICD-10-CM

## 2022-07-11 DIAGNOSIS — D57.00 SICKLE CELL PAIN CRISIS (H): ICD-10-CM

## 2022-07-11 DIAGNOSIS — E83.111 IRON OVERLOAD DUE TO REPEATED RED BLOOD CELL TRANSFUSIONS: ICD-10-CM

## 2022-07-11 LAB
ALBUMIN SERPL-MCNC: 4.2 G/DL (ref 3.4–5)
ALP SERPL-CCNC: 83 U/L (ref 40–150)
ALT SERPL W P-5'-P-CCNC: 42 U/L (ref 0–50)
ANION GAP SERPL CALCULATED.3IONS-SCNC: 8 MMOL/L (ref 3–14)
AST SERPL W P-5'-P-CCNC: 63 U/L (ref 0–45)
BASOPHILS # BLD AUTO: 0.1 10E3/UL (ref 0–0.2)
BASOPHILS NFR BLD AUTO: 1 %
BILIRUB SERPL-MCNC: 4.4 MG/DL (ref 0.2–1.3)
BUN SERPL-MCNC: 6 MG/DL (ref 7–30)
CALCIUM SERPL-MCNC: 8.6 MG/DL (ref 8.5–10.1)
CHLORIDE BLD-SCNC: 111 MMOL/L (ref 94–109)
CO2 SERPL-SCNC: 20 MMOL/L (ref 20–32)
CREAT SERPL-MCNC: 0.47 MG/DL (ref 0.52–1.04)
EOSINOPHIL # BLD AUTO: 0.3 10E3/UL (ref 0–0.7)
EOSINOPHIL NFR BLD AUTO: 3 %
ERYTHROCYTE [DISTWIDTH] IN BLOOD BY AUTOMATED COUNT: 25.3 % (ref 10–15)
FERRITIN SERPL-MCNC: 5623 NG/ML (ref 12–150)
GFR SERPL CREATININE-BSD FRML MDRD: >90 ML/MIN/1.73M2
GLUCOSE BLD-MCNC: 88 MG/DL (ref 70–99)
HCT VFR BLD AUTO: 20.9 % (ref 35–47)
HGB BLD-MCNC: 7.3 G/DL (ref 11.7–15.7)
IMM GRANULOCYTES # BLD: 0.1 10E3/UL
IMM GRANULOCYTES NFR BLD: 1 %
LYMPHOCYTES # BLD AUTO: 2.1 10E3/UL (ref 0.8–5.3)
LYMPHOCYTES NFR BLD AUTO: 20 %
MCH RBC QN AUTO: 30.3 PG (ref 26.5–33)
MCHC RBC AUTO-ENTMCNC: 34.9 G/DL (ref 31.5–36.5)
MCV RBC AUTO: 87 FL (ref 78–100)
MONOCYTES # BLD AUTO: 0.7 10E3/UL (ref 0–1.3)
MONOCYTES NFR BLD AUTO: 7 %
NEUTROPHILS # BLD AUTO: 7.2 10E3/UL (ref 1.6–8.3)
NEUTROPHILS NFR BLD AUTO: 68 %
NRBC # BLD AUTO: 0.5 10E3/UL
NRBC BLD AUTO-RTO: 5 /100
PLATELET # BLD AUTO: 487 10E3/UL (ref 150–450)
POTASSIUM BLD-SCNC: 3.7 MMOL/L (ref 3.4–5.3)
PROT SERPL-MCNC: 7.8 G/DL (ref 6.8–8.8)
RBC # BLD AUTO: 2.41 10E6/UL (ref 3.8–5.2)
RETICS # AUTO: 0.49 10E6/UL (ref 0.03–0.1)
RETICS/RBC NFR AUTO: 20.1 % (ref 0.5–2)
SODIUM SERPL-SCNC: 139 MMOL/L (ref 133–144)
WBC # BLD AUTO: 10.5 10E3/UL (ref 4–11)

## 2022-07-11 PROCEDURE — 96361 HYDRATE IV INFUSION ADD-ON: CPT

## 2022-07-11 PROCEDURE — 96374 THER/PROPH/DIAG INJ IV PUSH: CPT

## 2022-07-11 PROCEDURE — 85660 RBC SICKLE CELL TEST: CPT | Performed by: PEDIATRICS

## 2022-07-11 PROCEDURE — 258N000003 HC RX IP 258 OP 636: Performed by: PEDIATRICS

## 2022-07-11 PROCEDURE — 36591 DRAW BLOOD OFF VENOUS DEVICE: CPT | Performed by: PEDIATRICS

## 2022-07-11 PROCEDURE — 250N000011 HC RX IP 250 OP 636: Performed by: PEDIATRICS

## 2022-07-11 PROCEDURE — 85025 COMPLETE CBC W/AUTO DIFF WBC: CPT | Performed by: PEDIATRICS

## 2022-07-11 PROCEDURE — G0463 HOSPITAL OUTPT CLINIC VISIT: HCPCS

## 2022-07-11 PROCEDURE — 80053 COMPREHEN METABOLIC PANEL: CPT | Performed by: PEDIATRICS

## 2022-07-11 PROCEDURE — 99215 OFFICE O/P EST HI 40 MIN: CPT | Performed by: PEDIATRICS

## 2022-07-11 PROCEDURE — 82728 ASSAY OF FERRITIN: CPT | Performed by: PEDIATRICS

## 2022-07-11 PROCEDURE — 96376 TX/PRO/DX INJ SAME DRUG ADON: CPT

## 2022-07-11 PROCEDURE — 85045 AUTOMATED RETICULOCYTE COUNT: CPT | Performed by: PEDIATRICS

## 2022-07-11 PROCEDURE — 83020 HEMOGLOBIN ELECTROPHORESIS: CPT | Performed by: PEDIATRICS

## 2022-07-11 RX ORDER — HEPARIN SODIUM (PORCINE) LOCK FLUSH IV SOLN 100 UNIT/ML 100 UNIT/ML
5 SOLUTION INTRAVENOUS
Status: DISCONTINUED | OUTPATIENT
Start: 2022-07-11 | End: 2022-07-11 | Stop reason: HOSPADM

## 2022-07-11 RX ORDER — DIPHENHYDRAMINE HCL 25 MG
25 CAPSULE ORAL
Status: CANCELLED
Start: 2022-10-01

## 2022-07-11 RX ORDER — HEPARIN SODIUM (PORCINE) LOCK FLUSH IV SOLN 100 UNIT/ML 100 UNIT/ML
5 SOLUTION INTRAVENOUS
Status: COMPLETED | OUTPATIENT
Start: 2022-07-11 | End: 2022-07-11

## 2022-07-11 RX ORDER — ONDANSETRON 8 MG/1
8 TABLET, FILM COATED ORAL
Status: CANCELLED
Start: 2022-10-01

## 2022-07-11 RX ORDER — DEFERASIROX 360 MG/1
1440 TABLET, FILM COATED ORAL EVERY EVENING
Qty: 120 TABLET | Refills: 4 | Status: SHIPPED | OUTPATIENT
Start: 2022-07-11 | End: 2022-08-29

## 2022-07-11 RX ORDER — BUDESONIDE AND FORMOTEROL FUMARATE DIHYDRATE 160; 4.5 UG/1; UG/1
2 AEROSOL RESPIRATORY (INHALATION) 2 TIMES DAILY
Qty: 10.2 G | Refills: 3 | Status: SHIPPED | OUTPATIENT
Start: 2022-07-11 | End: 2023-05-08

## 2022-07-11 RX ORDER — HEPARIN SODIUM (PORCINE) LOCK FLUSH IV SOLN 100 UNIT/ML 100 UNIT/ML
5 SOLUTION INTRAVENOUS
Status: CANCELLED | OUTPATIENT
Start: 2022-10-01

## 2022-07-11 RX ORDER — MORPHINE SULFATE 2 MG/ML
2 INJECTION, SOLUTION INTRAMUSCULAR; INTRAVENOUS
Status: CANCELLED
Start: 2022-10-01

## 2022-07-11 RX ORDER — ASPIRIN 81 MG/1
81 TABLET, CHEWABLE ORAL 2 TIMES DAILY
Qty: 60 TABLET | Refills: 11 | Status: SHIPPED | OUTPATIENT
Start: 2022-07-11 | End: 2022-08-29

## 2022-07-11 RX ORDER — MORPHINE SULFATE 2 MG/ML
2 INJECTION, SOLUTION INTRAMUSCULAR; INTRAVENOUS
Status: DISCONTINUED | OUTPATIENT
Start: 2022-07-11 | End: 2022-07-11 | Stop reason: HOSPADM

## 2022-07-11 RX ORDER — ALBUTEROL SULFATE 0.83 MG/ML
2.5 SOLUTION RESPIRATORY (INHALATION) EVERY 6 HOURS PRN
Qty: 12 ML | Refills: 4 | Status: ON HOLD | OUTPATIENT
Start: 2022-07-11 | End: 2022-09-13

## 2022-07-11 RX ORDER — ALBUTEROL SULFATE 0.83 MG/ML
5 SOLUTION RESPIRATORY (INHALATION) EVERY 6 HOURS PRN
Qty: 90 ML | Refills: 3 | Status: SHIPPED | OUTPATIENT
Start: 2022-07-11 | End: 2023-05-08

## 2022-07-11 RX ORDER — HEPARIN SODIUM,PORCINE 10 UNIT/ML
5 VIAL (ML) INTRAVENOUS
Status: CANCELLED | OUTPATIENT
Start: 2022-10-01

## 2022-07-11 RX ADMIN — MORPHINE SULFATE 2 MG: 2 INJECTION, SOLUTION INTRAMUSCULAR; INTRAVENOUS at 13:43

## 2022-07-11 RX ADMIN — Medication 5 ML: at 11:38

## 2022-07-11 RX ADMIN — DEXTROSE AND SODIUM CHLORIDE 1000 ML: 5; 450 INJECTION, SOLUTION INTRAVENOUS at 13:43

## 2022-07-11 RX ADMIN — Medication 5 ML: at 15:58

## 2022-07-11 RX ADMIN — MORPHINE SULFATE 2 MG: 2 INJECTION, SOLUTION INTRAMUSCULAR; INTRAVENOUS at 14:41

## 2022-07-11 RX ADMIN — MORPHINE SULFATE 2 MG: 2 INJECTION, SOLUTION INTRAMUSCULAR; INTRAVENOUS at 15:45

## 2022-07-11 ASSESSMENT — PAIN SCALES - GENERAL: PAINLEVEL: EXTREME PAIN (8)

## 2022-07-11 NOTE — LETTER
"    7/11/2022         RE: Jennifer Cervantes  [address]  [facility] [address]        Dear Colleague,    Thank you for referring your patient, Jennifer Cervantes, to the [facility]. Please see a copy of my visit note below.        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 She had multiple thromboembolic events in 2021 despite adherent anticoagulation use (though warfarin was perpetually low) and there are concerns for chronic thromboembolic disease but did not have pulmonary HTN on a November 2021 cath. She is maintained on chronic PO opioids and twice-weekly infusion visits (since 1/24/22) but has been able to be maintained on this regimen and has stayed out of the ED most of the time with even rarer admissions.   on.    Interval History:  Jennifer is in clinic today alone. She is feeling pretty well overall. She did have to go to the ED around the 4th of July because the infusion clinic wasn't available. She did state that the ED intake person judged her as not having severe pain at check in, saying something to the effect of \"You don't look like you are in pain\" She said she also counted 6 patients go back before she did from the waiting room though they arrived later. She was very frustrated by the experience.    Her infusion strategy seems to be working quite well.  She is excited about how well she has been able to maintain her pain control as an outpatient.  She actually offered that she has been " "considering going down to once weekly infusions.  She thinks that after some initial turbulence with stopping the long-acting MS Contin in late June, wherein she had two ED visits in the latter half of June, she feels her 45 tablets a week is working well for the oxycodone.  She does want to continue weaning that dose, as we have discussed before, but she is preferring to try and reduce her infusion frequency first.  She did get into see the eye doctor.  It sounds like she has an astigmatism but it is not related to her chelation.  She was asking if there is a point of when we would restart her chelation.  She has not missed any doses of her aspirin.  She does need refills of her inhalers but feels that her breathing has been quite comfortable the summer.  She is enjoying playing with her nephew, though he exhausts her sometimes.      Sickle Cell Disease Comprehensive Checklist    Bone Health/Avascular Necrosis Screening/Symptoms (each visit): no new concerns today    Leg Ulcer evaluation (every visit): none    Hypertension (every visit):stable, high normal    Last pulmonary evaluation (asthma, AMAN, pulm HTN): within last year( due again)    Stroke/silent cerebral infarct Hx (Y/N): Yes TIA ~2014, first event ~age 1 with full stroke and R sided weakness    Last PCP Visit: 8/2020    Vaccines:  ? PCV13: 5/13/19  ? Pneumovax (PPSV23): 3/04, 10/09, 7/12/19 (next due 7/2024)  ? Menactra: 4/2010, 9/2015 (MCV done 8/16/21)  ? Influenza: got 20-21 vaccine per self report    Audiology (chelation): done 6/2020, normal.. However, on 6/7, \"While there is no significant change in grade on the CTCAE4.03 Scale, there were hearing changes bilaterally for both standard and extended high frequency audiometry.  Will need to determine if an ENT consult is advised due to the asymmetry in extended high frequency testing.\"     Plan last reviewed with patient: 7/11/22    Patient background: 23 yo F, enjoys movies and kids though there are " times where she does not really want to talk to people. Does not have a lot of social support at home.     Sickle Cell Disease History  Primary Hematologist Team: Jose Rafael Duncan  PCP: none  Genotype: SS  Acute Pain Crisis Treatment: (avoiding IV opioids for now, which she has agreed to)    ER   o Oxycodone 15-20 mg x1  o Ketamine 4mg IV x 2--helps with opioid sparing  o Toradol 15 mg IV x1   o Maintenance IV fluids with LR  o Other: Zofran 8 mg IV PRN nausea    Inpatient:  o Home oxycodone/Oxycontin regimen, though home oxycodone dosing could be increased to 20 mg to start  o PCA plan:   - None for now  o Other Medications: Zofran  o She has had success with ketamine starting at 4mg/h and advancing only to 6mg/h, as 8mg/h made her feel quite poorly..  o ASA  o Supportive Care: Docusate, Senna  Chronic Pain Medications:    Home regimen Oxycontin 10 mg q12h, oxycodone 15 mg p.o. q.4-6 hours p.r.n. breakthrough pain.  She also continues on Voltaren gel, and Zoloft among other medications.    -Also benefits from mental health visits, acupuncture  Baseline Hemoglobin: 7 g/dl without chronic transfusions  Hydroxyurea use: Yes  H/O blood transfusions: Yes, several (iron overload) Most recent 11/20/2021    H/O Transfusion Reactions: no    Antibodies:none  H/O Acute Chest Syndrome: Yes    Last episode: 10/2019     ICU/intubation: unsure  H/O Stroke: Yes (managed with chronic transfusions in the past, stopped late Spring 2020)  H/O VTE: Yes (2/2021)  H/O Cholecystectomy or Splenectomy: no  H/O Asthma, Pulm HTN, AVN, Leg Ulcers, Nephropathy, Retinopathy, etc: Iron overload, asthma, chronic lung disease, physical limitations from early stroke    ---------------------------------------  Jennifer Cervantes's Goals (discussed 7/11/22)    1-3 month goal:      6 month goal:  Work on finding a job, Work on driving    12 month goal:      Disease-specific goal(s):  Continue to stay out of the  hospital  ---------------------------------------      Current Outpatient Medications   Medication Sig Dispense Refill     acetaminophen (TYLENOL) 325 MG tablet Take 2 tablets (650 mg) by mouth every 6 hours as needed for mild pain 120 tablet 3     albuterol (PROAIR HFA/PROVENTIL HFA/VENTOLIN HFA) 108 (90 Base) MCG/ACT inhaler Inhale 2 puffs into the lungs every 6 hours as needed for shortness of breath / dyspnea or wheezing 8.5 g 3     albuterol (PROVENTIL) (2.5 MG/3ML) 0.083% neb solution Take 1 vial (2.5 mg) by nebulization every 6 hours as needed for shortness of breath / dyspnea or wheezing 12 mL 4     aspirin (ASA) 81 MG chewable tablet Take 1 tablet (81 mg) by mouth 2 times daily 60 tablet 11     budesonide-formoterol (SYMBICORT) 160-4.5 MCG/ACT Inhaler Inhale 2 puffs into the lungs 2 times daily 10.2 g 3     diphenhydrAMINE (BENADRYL) 25 MG capsule Take 1-2 capsules (25-50 mg) by mouth nightly as needed for sleep 60 capsule 3     EPINEPHrine (ANY BX GENERIC EQUIV) 0.3 MG/0.3ML injection 2-pack Inject 0.3 mLs (0.3 mg) into the muscle as needed for anaphylaxis 1 each 1     Hydroxyurea 1000 MG TABS Take 3,000 mg by mouth daily 90 tablet 3     ondansetron (ZOFRAN) 8 MG tablet Take 1 tablet (8 mg) by mouth every 8 hours as needed 30 tablet 1     oxyCODONE IR (ROXICODONE) 15 MG tablet Take 1 tablet (15 mg) by mouth every 4 hours as needed for severe pain Goal 4 per day. Max 6 per day. 45 tablet 0       Past Medical History  Past Medical History:   Diagnosis Date     Anxiety      Bleeding disorder (H)      Blood clotting disorder (H)      Cerebral infarction (H) 2015     Cognitive developmental delay     low IQ. Please recognize when managing pain and planning with her     Depressive disorder      Hemiplegia and hemiparesis following cerebral infarction affecting right dominant side (H)     right hand contractures     Iron overload due to repeated red blood cell transfusions      Migraines      Multiple  subsegmental pulmonary emboli without acute cor pulmonale (H) 02/01/2021     Oppositional defiant behavior      Superficial venous thrombosis of arm, right 03/25/2021     Uncomplicated asthma      Past Surgical History:   Procedure Laterality Date     AS INSERT TUNNELED CV 2 CATH W/O PORT/PUMP       CHOLECYSTECTOMY       CV RIGHT HEART CATH MEASUREMENTS RECORDED N/A 11/18/2021    Procedure: Right Heart Cath;  Surgeon: Jackson Stauffer MD;  Location:  HEART CARDIAC CATH LAB     INSERT PORT VASCULAR ACCESS Left 4/21/2021    Procedure: INSERTION, VASCULAR ACCESS PORT (NOT SURE ON SIDE UNTIL REMOVAL);  Surgeon: Rajan More MD;  Location: [facility] OR     IR CHEST PORT PLACEMENT > 5 YRS OF AGE  4/21/2021     IR CVC NON TUNNEL LINE REMOVAL  5/6/2021     IR CVC NON TUNNEL PLACEMENT  04/07/2020     IR CVC NON TUNNEL PLACEMENT  4/30/2021     IR PORT REMOVAL LEFT  4/21/2021     REMOVE PORT VASCULAR ACCESS Left 4/21/2021    Procedure: REMOVAL, VASCULAR ACCESS PORT LEFT;  Surgeon: Rajan More MD;  Location: [facility] OR     REPAIR TENDON ELBOW Right 10/02/2019    Procedure: Right Elbow Flexor Lengthening, Flexor Pronator Slide Of Wrist and Finger, Thumb Adductor Lengthening;  Surgeon: Anai Franco MD;  Location: [facility] OR     TONSILLECTOMY Bilateral 10/02/2019    Procedure: Bilateral Tonsillectomy;  Surgeon: Farhana Guy MD;  Location: [facility] OR     ZZC BREAST REDUCTION (INCLUDES LIPO) TIER 3 Bilateral 04/23/2019     Allergies   Allergen Reactions     Contrast Dye      Hives and breathing issues     Fish-Derived Products Hives     Seafood Hives     Diagnostic X-Ray Materials      Gadolinium      Social History   Social History     Tobacco Use     Smoking status: Never Smoker     Smokeless tobacco: Never Used   Substance Use Topics     Alcohol use: Not Currently     Alcohol/week: 0.0 standard drinks     Drug use: Never    Still living at home with mom and extended family.     Past medical history and social  history were reviewed.    Physical Examination:  /75 (BP Location: Right arm, Patient Position: Sitting, Cuff Size: Adult Regular)   Pulse 104   Temp 98  F (36.7  C) (Oral)   Resp 16   Wt 76.9 kg (169 lb 8 oz)   SpO2 100%   BMI 29.09 kg/m    Wt Readings from Last 10 Encounters:   06/26/22 76.7 kg (169 lb)   06/20/22 76.8 kg (169 lb 6.4 oz)   06/06/22 76.7 kg (169 lb)   05/27/22 77.2 kg (170 lb 3.2 oz)   05/17/22 77.9 kg (171 lb 11.8 oz)   04/15/22 77 kg (169 lb 12.8 oz)   03/30/22 77.1 kg (170 lb)   03/21/22 77.1 kg (169 lb 14.4 oz)   03/20/22 77.1 kg (170 lb)   03/11/22 76.6 kg (168 lb 12.8 oz)     General: Reasonably well-appearing today consistent with her baseline. Smiling and joking around today and in good spirits  Eyes: EOMI, PERRL. No scleral icterus.  Skin: no bruising noted on exposed skin. Port site c/d/i, not yet accessed  Respiratory:  Normal respiratory effort. Lungs clear to auscultation.  Cardiac: RRR, normal rhythm. No murmur.  Neurologic: Cranial nerves II through XII are grossly intact. Contractured right hand 2/2 stroke history, mild antalgic gait    Laboratory Data:   Latest Reference Range & Units 07/11/22 11:43   Sodium 133 - 144 mmol/L 139   Potassium 3.4 - 5.3 mmol/L 3.7   Chloride 94 - 109 mmol/L 111 (H)   Carbon Dioxide 20 - 32 mmol/L 20   Urea Nitrogen 7 - 30 mg/dL 6 (L)   Creatinine 0.52 - 1.04 mg/dL 0.47 (L)   GFR Estimate >60 mL/min/1.73m2 >90   Calcium 8.5 - 10.1 mg/dL 8.6   Anion Gap 3 - 14 mmol/L 8   Albumin 3.4 - 5.0 g/dL 4.2   Protein Total 6.8 - 8.8 g/dL 7.8   Alkaline Phosphatase 40 - 150 U/L 83   ALT 0 - 50 U/L 42   AST 0 - 45 U/L 63 (H)   Bilirubin Total 0.2 - 1.3 mg/dL 4.4 (H)   Ferritin 12 - 150 ng/mL 5,623 (H)   Hemoglobin A 95.0 - 97.9 % 0.0 (L)   Hemoglobin A2 2.0 - 3.5 % 3.3   Hemoglobin C 0.0 - 0.0 % 0.0   Hemoglobin E 0.0 - 0.0 % 0.0   Hemoglobin F 0.0 - 2.1 % 4.8 (H)   Hemoglobin S 0.0 - 0.0 % 90.6 (H)   Hemoglobin Other 0.0 - 0.0 % 1.3 (H)   Glucose 70 -  99 mg/dL 88   WBC 4.0 - 11.0 10e3/uL 10.5   Hemoglobin 11.7 - 15.7 g/dL 7.3 (L)   Hematocrit 35.0 - 47.0 % 20.9 (L)   Platelet Count 150 - 450 10e3/uL 487 (H)   RBC Count 3.80 - 5.20 10e6/uL 2.41 (L)   MCV 78 - 100 fL 87   MCH 26.5 - 33.0 pg 30.3   MCHC 31.5 - 36.5 g/dL 34.9   RDW 10.0 - 15.0 % 25.3 (H)   % Neutrophils % 68   % Lymphocytes % 20   % Monocytes % 7   % Eosinophils % 3   % Basophils % 1   Absolute Basophils 0.0 - 0.2 10e3/uL 0.1   Absolute Eosinophils 0.0 - 0.7 10e3/uL 0.3   Absolute Immature Granulocytes <=0.4 10e3/uL 0.1   Absolute Lymphocytes 0.8 - 5.3 10e3/uL 2.1   Absolute Monocytes 0.0 - 1.3 10e3/uL 0.7   % Immature Granulocytes % 1   Absolute Neutrophils 1.6 - 8.3 10e3/uL 7.2   Absolute NRBCs 10e3/uL 0.5   NRBCs per 100 WBC <1 /100 5 (H)   % Retic 0.5 - 2.0 % 20.1 (H)   Absolute Retic 0.025 - 0.095 10e6/uL 0.495 (H)   (H): Data is abnormally high  (L): Data is abnormally low    Most recent labs reviewed and interpreted by me today.      Assessment and Plan:  1. Sickle Cell HgbSS Disease  2. Chronic Pain  3. Iron overload  4. 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 History of CVA  6. Hearing loss    Overall Jennifer has been doing well with pain management and pain management has been better over the past few weeks. We have been able to cut down significantly on ED visits, with and her last admission was 1/29/22. We are managing without Suboxone for now but she is still optimistic about weaning opioids further in coming weeks. I support her attempts to further reduce infusion visits and we will plan on weaning oxycodone further in 1-2 months (September?)     She has been off of the warfarin now and back on her aspirin, which is dosed twice a day and she remains adherent. Chelation has been on hold due to vision changes but it appears that it was an astigmatism rather than chelation-induced. She did have some hearing loss and I will refer to ENT but will start back  on Jadenu for now. I am not sure she is taking HU regularly based on her HbS/HbF % and MCV. We will discuss it further in coming visits.     Plan:  -Continue Hydrea to 3000mg daily to help lessen frequency of sickle cell pain  -Continue Oxycodone at home. She can self-reduce infusion days/week but I will keep the cap at 2/week for now  -Infusion center visits limited to two times per week. Continue diligent home management with current medications, heat, rest, compression, warm baths.   -If unable to manage at home can go to ED but continue to not do IV narcotics in the emergency room. Ketamine previously added to pain plan in ED  -Restart deferasirox, holding deferoxamine for now  -Continue aspirin BID.   -RTC in ~6 weeks .    50 minutes spent on the date of the encounter doing chart review, review of test results, patient visit and documentation               Again, thank you for allowing me to participate in the care of your patient.      Sincerely,    Eric Duncan MD

## 2022-07-11 NOTE — PATIENT INSTRUCTIONS
Contact Numbers  [facility]: [phone]    After Hours:  [phone]  Triage: [phone]    Please call the [facility] Triage line if you experience a temperature greater than or equal to 100.5, shaking chills, have uncontrolled nausea, vomiting and/or diarrhea, dizziness, shortness of breath, chest pain, bleeding, unexplained bruising, or if you have any other new/concerning symptoms, questions or concerns.     If it is after hours, weekends, or holidays, please call the main hospital  at  [phone] and ask to speak to the Oncology doctor on call.     If you are having any concerning symptoms or wish to speak to a provider before your next infusion visit, please call your care coordinator or triage to notify them so we can adequately serve you.     If you need a refill on a narcotic prescription or other medication, please call triage before your infusion appointment.       July 2022 Sunday Monday Tuesday Wednesday Thursday Friday Saturday                            1     2    Admission   8:21 AM   [facility]   (Discharge: 7/2/2022)   3     4     5    BMT INFUSION 2 HR (120 MIN)   1:00 PM   (120 min.)    BMT INFUSION NURSE   [facility] [place] 6    [facility] RETURN GENERAL   8:25 AM   (20 min.)   Usama Mcclendon OD   [facility] 7     8    BMT INFUSION 2 HR (120 MIN)  12:00 PM   (120 min.)    BMT INFUSION NURSE   [facility] [place] 9       10     11    LAB PERIPHERAL  12:00 PM   (15 min.)    [facility] DRAW   [facility]    RETURN  12:15 PM   (30 min.)   Eric Duncan MD   [facility]    ONC INFUSION 2 HR (120 MIN)   1:00 PM   (120 min.)    ONC INFUSION NURSE   [facility] 12     13     14     15    RETURN  10:45 AM   (60 min.)   Stan  Katherine Renae MD   [facility] 16       17     18     19     20     21     22     23       24     25     26     27     28     29     30       31                                                 August 2022 Sunday Monday Tuesday Wednesday Thursday Friday Saturday        1     2     3     4     5     6       7     8     9     10     11     12     13       14     15     16     17     18     19     20       21     22     23     24     25    [facility] PHYSICAL   2:45 PM   (30 min.)   Suraj Case MD   [facility] [place] 26     27       28     29    LAB PERIPHERAL  11:30 AM   (15 min.)    [facility] DRAW   [facility]    RETURN  11:45 AM   (30 min.)   Eric Duncan MD   [facility]    ONC INFUSION 2 HR (120 MIN)   1:00 PM   (120 min.)    ONC INFUSION NURSE   [facility] 30 31                                      Lab Results:  Recent Results (from the past 12 hour(s))   Ferritin    Collection Time: 07/11/22 11:43 AM   Result Value Ref Range    Ferritin 5,623 (H) 12 - 150 ng/mL   Comprehensive metabolic panel    Collection Time: 07/11/22 11:43 AM   Result Value Ref Range    Sodium 139 133 - 144 mmol/L    Potassium 3.7 3.4 - 5.3 mmol/L    Chloride 111 (H) 94 - 109 mmol/L    Carbon Dioxide (CO2) 20 20 - 32 mmol/L    Anion Gap 8 3 - 14 mmol/L    Urea Nitrogen 6 (L) 7 - 30 mg/dL    Creatinine 0.47 (L) 0.52 - 1.04 mg/dL    Calcium 8.6 8.5 - 10.1 mg/dL    Glucose 88 70 - 99 mg/dL    Alkaline Phosphatase 83 40 - 150 U/L    AST 63 (H) 0 - 45 U/L    ALT 42 0 - 50 U/L    Protein Total 7.8 6.8 - 8.8 g/dL    Albumin 4.2 3.4 - 5.0 g/dL    Bilirubin Total 4.4 (H) 0.2 - 1.3 mg/dL    GFR Estimate >90 >60 mL/min/1.73m2   Reticulocyte count    Collection Time: 07/11/22 11:43 AM   Result Value Ref Range    % Reticulocyte 20.1 (H) 0.5 - 2.0 %    Absolute Reticulocyte  0.495 (H) 0.025 - 0.095 10e6/uL   CBC with platelets and differential    Collection Time: 07/11/22 11:43 AM   Result Value Ref Range    WBC Count 10.5 4.0 - 11.0 10e3/uL    RBC Count 2.41 (L) 3.80 - 5.20 10e6/uL    Hemoglobin 7.3 (L) 11.7 - 15.7 g/dL    Hematocrit 20.9 (L) 35.0 - 47.0 %    MCV 87 78 - 100 fL    MCH 30.3 26.5 - 33.0 pg    MCHC 34.9 31.5 - 36.5 g/dL    RDW 25.3 (H) 10.0 - 15.0 %    Platelet Count 487 (H) 150 - 450 10e3/uL    % Neutrophils 68 %    % Lymphocytes 20 %    % Monocytes 7 %    % Eosinophils 3 %    % Basophils 1 %    % Immature Granulocytes 1 %    NRBCs per 100 WBC 5 (H) <1 /100    Absolute Neutrophils 7.2 1.6 - 8.3 10e3/uL    Absolute Lymphocytes 2.1 0.8 - 5.3 10e3/uL    Absolute Monocytes 0.7 0.0 - 1.3 10e3/uL    Absolute Eosinophils 0.3 0.0 - 0.7 10e3/uL    Absolute Basophils 0.1 0.0 - 0.2 10e3/uL    Absolute Immature Granulocytes 0.1 <=0.4 10e3/uL    Absolute NRBCs 0.5 10e3/uL

## 2022-07-11 NOTE — TELEPHONE ENCOUNTER
PA Initiation    Medication: Deferasirox PA   Insurance Company: [employer] - Phone [phone] Fax [phone]  Pharmacy Filling the Rx:    Filling Pharmacy Phone:    Filling Pharmacy Fax:    Start Date: 7/11/2022    [id number]      Carole Ritchie  Oncology Pharmacy Liaison II  [email or web address]  Phone: [phone]  Fax: [phone]

## 2022-07-11 NOTE — NURSING NOTE
"Chief Complaint   Patient presents with     Port Draw     Labs drawn from port by rn.  VS taken.     Port accessed with 20 gauge 3/4\" gripper needle and labs drawn by rn.  Port flushed with NS and heparin.  Pt tolerated well.  VS taken.  Pt checked in for next appt.    Gay Rice RN      "

## 2022-07-11 NOTE — NURSING NOTE
"Oncology Rooming Note    July 11, 2022 12:24 PM   Jennifer Cervantes is a 22 year old female who presents for:    Chief Complaint   Patient presents with     Port Draw     Labs drawn from port by rn.  VS taken.     Oncology Clinic Visit     Sickle Cell Anemia     Initial Vitals: /75 (BP Location: Right arm, Patient Position: Sitting, Cuff Size: Adult Regular)   Pulse 104   Temp 98  F (36.7  C) (Oral)   Resp 16   Wt 76.9 kg (169 lb 8 oz)   SpO2 100%   BMI 29.09 kg/m   Estimated body mass index is 29.09 kg/m  as calculated from the following:    Height as of 3/20/22: 1.626 m (5' 4\").    Weight as of this encounter: 76.9 kg (169 lb 8 oz). Body surface area is 1.86 meters squared.  Extreme Pain (8) Comment: Data Unavailable   No LMP recorded. Patient has had an injection.  Allergies reviewed: Yes  Medications reviewed: Yes    Medications: Medication refills not needed today.  Pharmacy name entered into [employer]: [facility] [place], [place] - [address] [facility] [phone]    Clinical concerns: Pt presents today for f/u.       Stacey Murphy LPN  7/11/2022              "

## 2022-07-11 NOTE — PROGRESS NOTES
Infusion Nursing Note:  Jennifer Cervantes presents today for IVF/pain medication.    Patient seen by provider today: Yes: Dr. Duncan   present during visit today: Not Applicable.    Note: Patient arrived at infusion with PS 8/10 non radiating sharp pain at lower back. Denies fever/chills. No new concerns made. Otherwise well.     Patient states that her Botox appointment will be on 7/15.    Upon discharge, patient had 3 doses of Morphine with PS 4/10. Patient agreed to go home and verbalized knowledge on where and when to call triage if symptoms persists/worsen.     Intravenous Access:  Implanted Port.    Treatment Conditions:  Lab Results   Component Value Date    HGB 7.3 (L) 07/11/2022    WBC 10.5 07/11/2022    ANEU 10.2 (H) 06/26/2022    ANEUTAUTO 7.2 07/11/2022     (H) 07/11/2022      Lab Results   Component Value Date     07/11/2022    POTASSIUM 3.7 07/11/2022    MAG 2.0 11/11/2021    CR 0.47 (L) 07/11/2022    MICAH 8.6 07/11/2022    BILITOTAL 4.4 (H) 07/11/2022    ALBUMIN 4.2 07/11/2022    ALT 42 07/11/2022    AST 63 (H) 07/11/2022     Results reviewed, labs MET treatment parameters, ok to proceed with treatment.    Post Infusion Assessment:  Patient tolerated infusion without incident.  Blood return noted pre and post infusion.  Site patent and intact, free from redness, edema or discomfort.  No evidence of extravasations.  Access discontinued per protocol.     Discharge Plan:   Prescription refills given for Symbicort, Jadenu, Albuterol, Hydroxyurea and Aspirin.  Patient declined prescription refills.  Discharge instructions reviewed with: Patient.  Patient and/or family verbalized understanding of discharge instructions and all questions answered.  Copy of AVS reviewed with patient and/or family.  Patient will return 8/29/22 for next appointment.  Patient discharged in stable condition accompanied by: self.  Departure Mode: Ambulatory.      GLORIA YANCEY RN

## 2022-07-11 NOTE — PROGRESS NOTES
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 She had multiple thromboembolic events in 2021 despite adherent anticoagulation use (though warfarin was perpetually low) and there are concerns for chronic thromboembolic disease but did not have pulmonary HTN on a November 2021 cath. She is maintained on chronic PO opioids and twice-weekly infusion visits (since 1/24/22) but has been able to be maintained on this regimen and has stayed out of the ED most of the time with even rarer admissions.   on.    Interval History:  Jennifer is in clinic today alone. She is feeling pretty well overall. She did have to go to the ED around the 4th of July because the infusion clinic wasn't available. She did state that the ED intake person judged her as not having severe pain at check in, saying something to the effect of \"You don't look like you are in pain\" She said she also counted 6 patients go back before she did from the waiting room though they arrived later. She was very frustrated by the experience.    Her infusion strategy seems to be working quite well.  She is excited about how well she has been able to maintain her pain control as an outpatient.  She actually offered that she has been considering going down to once weekly infusions.  She thinks that after some initial turbulence with stopping the long-acting MS Contin in late June, wherein she had two ED visits in the latter half of June, she feels her 45 tablets a week is working well for " "the oxycodone.  She does want to continue weaning that dose, as we have discussed before, but she is preferring to try and reduce her infusion frequency first.  She did get into see the eye doctor.  It sounds like she has an astigmatism but it is not related to her chelation.  She was asking if there is a point of when we would restart her chelation.  She has not missed any doses of her aspirin.  She does need refills of her inhalers but feels that her breathing has been quite comfortable the summer.  She is enjoying playing with her nephew, though he exhausts her sometimes.      Sickle Cell Disease Comprehensive Checklist    Bone Health/Avascular Necrosis Screening/Symptoms (each visit): no new concerns today    Leg Ulcer evaluation (every visit): none    Hypertension (every visit):stable, high normal    Last pulmonary evaluation (asthma, AMAN, pulm HTN): within last year( due again)    Stroke/silent cerebral infarct Hx (Y/N): Yes TIA ~2014, first event ~age 1 with full stroke and R sided weakness    Last PCP Visit: 8/2020    Vaccines:  ? PCV13: 5/13/19  ? Pneumovax (PPSV23): 3/04, 10/09, 7/12/19 (next due 7/2024)  ? Menactra: 4/2010, 9/2015 (MCV done 8/16/21)  ? Influenza: got 20-21 vaccine per self report    Audiology (chelation): done 6/2020, normal.. However, on 6/7, \"While there is no significant change in grade on the CTCAE4.03 Scale, there were hearing changes bilaterally for both standard and extended high frequency audiometry.  Will need to determine if an ENT consult is advised due to the asymmetry in extended high frequency testing.\"     Plan last reviewed with patient: 7/11/22    Patient background: 23 yo F, enjoys movies and kids though there are times where she does not really want to talk to people. Does not have a lot of social support at home.     Sickle Cell Disease History  Primary Hematologist Team: Jose Rafael Duncan  PCP: none  Genotype: SS  Acute Pain Crisis Treatment: (avoiding IV opioids for " now, which she has agreed to)    ER   o Oxycodone 15-20 mg x1  o Ketamine 4mg IV x 2--helps with opioid sparing  o Toradol 15 mg IV x1   o Maintenance IV fluids with LR  o Other: Zofran 8 mg IV PRN nausea    Inpatient:  o Home oxycodone/Oxycontin regimen, though home oxycodone dosing could be increased to 20 mg to start  o PCA plan:   - None for now  o Other Medications: Zofran  o She has had success with ketamine starting at 4mg/h and advancing only to 6mg/h, as 8mg/h made her feel quite poorly..  o ASA  o Supportive Care: Docusate, Senna  Chronic Pain Medications:    Home regimen Oxycontin 10 mg q12h, oxycodone 15 mg p.o. q.4-6 hours p.r.n. breakthrough pain.  She also continues on Voltaren gel, and Zoloft among other medications.    -Also benefits from mental health visits, acupuncture  Baseline Hemoglobin: 7 g/dl without chronic transfusions  Hydroxyurea use: Yes  H/O blood transfusions: Yes, several (iron overload) Most recent 11/20/2021    H/O Transfusion Reactions: no    Antibodies:none  H/O Acute Chest Syndrome: Yes    Last episode: 10/2019     ICU/intubation: unsure  H/O Stroke: Yes (managed with chronic transfusions in the past, stopped late Spring 2020)  H/O VTE: Yes (2/2021)  H/O Cholecystectomy or Splenectomy: no  H/O Asthma, Pulm HTN, AVN, Leg Ulcers, Nephropathy, Retinopathy, etc: Iron overload, asthma, chronic lung disease, physical limitations from early stroke    ---------------------------------------  Jennifer Cervantes's Goals (discussed 7/11/22)    1-3 month goal:      6 month goal:  Work on finding a job, Work on driving    12 month goal:      Disease-specific goal(s):  Continue to stay out of the hospital  ---------------------------------------      Current Outpatient Medications   Medication Sig Dispense Refill     acetaminophen (TYLENOL) 325 MG tablet Take 2 tablets (650 mg) by mouth every 6 hours as needed for mild pain 120 tablet 3     albuterol (PROAIR HFA/PROVENTIL HFA/VENTOLIN HFA) 108 (90  Base) MCG/ACT inhaler Inhale 2 puffs into the lungs every 6 hours as needed for shortness of breath / dyspnea or wheezing 8.5 g 3     albuterol (PROVENTIL) (2.5 MG/3ML) 0.083% neb solution Take 1 vial (2.5 mg) by nebulization every 6 hours as needed for shortness of breath / dyspnea or wheezing 12 mL 4     aspirin (ASA) 81 MG chewable tablet Take 1 tablet (81 mg) by mouth 2 times daily 60 tablet 11     budesonide-formoterol (SYMBICORT) 160-4.5 MCG/ACT Inhaler Inhale 2 puffs into the lungs 2 times daily 10.2 g 3     diphenhydrAMINE (BENADRYL) 25 MG capsule Take 1-2 capsules (25-50 mg) by mouth nightly as needed for sleep 60 capsule 3     EPINEPHrine (ANY BX GENERIC EQUIV) 0.3 MG/0.3ML injection 2-pack Inject 0.3 mLs (0.3 mg) into the muscle as needed for anaphylaxis 1 each 1     Hydroxyurea 1000 MG TABS Take 3,000 mg by mouth daily 90 tablet 3     ondansetron (ZOFRAN) 8 MG tablet Take 1 tablet (8 mg) by mouth every 8 hours as needed 30 tablet 1     oxyCODONE IR (ROXICODONE) 15 MG tablet Take 1 tablet (15 mg) by mouth every 4 hours as needed for severe pain Goal 4 per day. Max 6 per day. 45 tablet 0       Past Medical History  Past Medical History:   Diagnosis Date     Anxiety      Bleeding disorder (H)      Blood clotting disorder (H)      Cerebral infarction (H) 2015     Cognitive developmental delay     low IQ. Please recognize when managing pain and planning with her     Depressive disorder      Hemiplegia and hemiparesis following cerebral infarction affecting right dominant side (H)     right hand contractures     Iron overload due to repeated red blood cell transfusions      Migraines      Multiple subsegmental pulmonary emboli without acute cor pulmonale (H) 02/01/2021     Oppositional defiant behavior      Superficial venous thrombosis of arm, right 03/25/2021     Uncomplicated asthma      Past Surgical History:   Procedure Laterality Date     AS INSERT TUNNELED CV 2 CATH W/O PORT/PUMP       CHOLECYSTECTOMY        CV RIGHT HEART CATH MEASUREMENTS RECORDED N/A 11/18/2021    Procedure: Right Heart Cath;  Surgeon: Jackson Stauffer MD;  Location:  HEART CARDIAC CATH LAB     INSERT PORT VASCULAR ACCESS Left 4/21/2021    Procedure: INSERTION, VASCULAR ACCESS PORT (NOT SURE ON SIDE UNTIL REMOVAL);  Surgeon: Rajan More MD;  Location: [facility] OR     IR CHEST PORT PLACEMENT > 5 YRS OF AGE  4/21/2021     IR CVC NON TUNNEL LINE REMOVAL  5/6/2021     IR CVC NON TUNNEL PLACEMENT  04/07/2020     IR CVC NON TUNNEL PLACEMENT  4/30/2021     IR PORT REMOVAL LEFT  4/21/2021     REMOVE PORT VASCULAR ACCESS Left 4/21/2021    Procedure: REMOVAL, VASCULAR ACCESS PORT LEFT;  Surgeon: Rajan More MD;  Location: [facility] OR     REPAIR TENDON ELBOW Right 10/02/2019    Procedure: Right Elbow Flexor Lengthening, Flexor Pronator Slide Of Wrist and Finger, Thumb Adductor Lengthening;  Surgeon: Anai Franco MD;  Location: [facility]     TONSILLECTOMY Bilateral 10/02/2019    Procedure: Bilateral Tonsillectomy;  Surgeon: Farhana Guy MD;  Location: [facility] OR     ZZC BREAST REDUCTION (INCLUDES LIPO) TIER 3 Bilateral 04/23/2019     Allergies   Allergen Reactions     Contrast Dye      Hives and breathing issues     Fish-Derived Products Hives     Seafood Hives     Diagnostic X-Ray Materials      Gadolinium      Social History   Social History     Tobacco Use     Smoking status: Never Smoker     Smokeless tobacco: Never Used   Substance Use Topics     Alcohol use: Not Currently     Alcohol/week: 0.0 standard drinks     Drug use: Never    Still living at home with mom and extended family.     Past medical history and social history were reviewed.    Physical Examination:  /75 (BP Location: Right arm, Patient Position: Sitting, Cuff Size: Adult Regular)   Pulse 104   Temp 98  F (36.7  C) (Oral)   Resp 16   Wt 76.9 kg (169 lb 8 oz)   SpO2 100%   BMI 29.09 kg/m    Wt Readings from Last 10 Encounters:   06/26/22 76.7 kg (169 lb)    06/20/22 76.8 kg (169 lb 6.4 oz)   06/06/22 76.7 kg (169 lb)   05/27/22 77.2 kg (170 lb 3.2 oz)   05/17/22 77.9 kg (171 lb 11.8 oz)   04/15/22 77 kg (169 lb 12.8 oz)   03/30/22 77.1 kg (170 lb)   03/21/22 77.1 kg (169 lb 14.4 oz)   03/20/22 77.1 kg (170 lb)   03/11/22 76.6 kg (168 lb 12.8 oz)     General: Reasonably well-appearing today consistent with her baseline. Smiling and joking around today and in good spirits  Eyes: EOMI, PERRL. No scleral icterus.  Skin: no bruising noted on exposed skin. Port site c/d/i, not yet accessed  Respiratory:  Normal respiratory effort. Lungs clear to auscultation.  Cardiac: RRR, normal rhythm. No murmur.  Neurologic: Cranial nerves II through XII are grossly intact. Contractured right hand 2/2 stroke history, mild antalgic gait    Laboratory Data:   Latest Reference Range & Units 07/11/22 11:43   Sodium 133 - 144 mmol/L 139   Potassium 3.4 - 5.3 mmol/L 3.7   Chloride 94 - 109 mmol/L 111 (H)   Carbon Dioxide 20 - 32 mmol/L 20   Urea Nitrogen 7 - 30 mg/dL 6 (L)   Creatinine 0.52 - 1.04 mg/dL 0.47 (L)   GFR Estimate >60 mL/min/1.73m2 >90   Calcium 8.5 - 10.1 mg/dL 8.6   Anion Gap 3 - 14 mmol/L 8   Albumin 3.4 - 5.0 g/dL 4.2   Protein Total 6.8 - 8.8 g/dL 7.8   Alkaline Phosphatase 40 - 150 U/L 83   ALT 0 - 50 U/L 42   AST 0 - 45 U/L 63 (H)   Bilirubin Total 0.2 - 1.3 mg/dL 4.4 (H)   Ferritin 12 - 150 ng/mL 5,623 (H)   Hemoglobin A 95.0 - 97.9 % 0.0 (L)   Hemoglobin A2 2.0 - 3.5 % 3.3   Hemoglobin C 0.0 - 0.0 % 0.0   Hemoglobin E 0.0 - 0.0 % 0.0   Hemoglobin F 0.0 - 2.1 % 4.8 (H)   Hemoglobin S 0.0 - 0.0 % 90.6 (H)   Hemoglobin Other 0.0 - 0.0 % 1.3 (H)   Glucose 70 - 99 mg/dL 88   WBC 4.0 - 11.0 10e3/uL 10.5   Hemoglobin 11.7 - 15.7 g/dL 7.3 (L)   Hematocrit 35.0 - 47.0 % 20.9 (L)   Platelet Count 150 - 450 10e3/uL 487 (H)   RBC Count 3.80 - 5.20 10e6/uL 2.41 (L)   MCV 78 - 100 fL 87   MCH 26.5 - 33.0 pg 30.3   MCHC 31.5 - 36.5 g/dL 34.9   RDW 10.0 - 15.0 % 25.3 (H)   %  Neutrophils % 68   % Lymphocytes % 20   % Monocytes % 7   % Eosinophils % 3   % Basophils % 1   Absolute Basophils 0.0 - 0.2 10e3/uL 0.1   Absolute Eosinophils 0.0 - 0.7 10e3/uL 0.3   Absolute Immature Granulocytes <=0.4 10e3/uL 0.1   Absolute Lymphocytes 0.8 - 5.3 10e3/uL 2.1   Absolute Monocytes 0.0 - 1.3 10e3/uL 0.7   % Immature Granulocytes % 1   Absolute Neutrophils 1.6 - 8.3 10e3/uL 7.2   Absolute NRBCs 10e3/uL 0.5   NRBCs per 100 WBC <1 /100 5 (H)   % Retic 0.5 - 2.0 % 20.1 (H)   Absolute Retic 0.025 - 0.095 10e6/uL 0.495 (H)   (H): Data is abnormally high  (L): Data is abnormally low    Most recent labs reviewed and interpreted by me today.      Assessment and Plan:  1. Sickle Cell HgbSS Disease  2. Chronic Pain  3. Iron overload  4. 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 History of CVA  6. Hearing loss    Overall Jennifer has been doing well with pain management and pain management has been better over the past few weeks. We have been able to cut down significantly on ED visits, with and her last admission was 1/29/22. We are managing without Suboxone for now but she is still optimistic about weaning opioids further in coming weeks. I support her attempts to further reduce infusion visits and we will plan on weaning oxycodone further in 1-2 months (September?)     She has been off of the warfarin now and back on her aspirin, which is dosed twice a day and she remains adherent. Chelation has been on hold due to vision changes but it appears that it was an astigmatism rather than chelation-induced. She did have some hearing loss and I will refer to ENT but will start back on Jadenu for now. I am not sure she is taking HU regularly based on her HbS/HbF % and MCV. We will discuss it further in coming visits.     Plan:  -Continue Hydrea to 3000mg daily to help lessen frequency of sickle cell pain  -Continue Oxycodone at home. She can self-reduce infusion days/week but I will  keep the cap at 2/week for now  -Infusion center visits limited to two times per week. Continue diligent home management with current medications, heat, rest, compression, warm baths.   -If unable to manage at home can go to ED but continue to not do IV narcotics in the emergency room. Ketamine previously added to pain plan in ED  -Restart deferasirox, holding deferoxamine for now  -Continue aspirin BID.   -RTC in ~6 weeks .    50 minutes spent on the date of the encounter doing chart review, review of test results, patient visit and documentation         Eric Duncan MD  Hematologist  Division of Hematology, Oncology, and Transplantation  [facility]  [facility]  Pager: [phone]

## 2022-07-13 LAB
HGB A1 MFR BLD: 0 %
HGB A2 MFR BLD: 3.3 %
HGB C MFR BLD: 0 %
HGB E MFR BLD: 0 %
HGB F MFR BLD: 4.8 %
HGB FRACT BLD ELPH-IMP: ABNORMAL
HGB OTHER MFR BLD: 1.3 %
HGB S BLD QL SOLY: ABNORMAL
HGB S MFR BLD: 90.6 %
PATH INTERP BLD-IMP: ABNORMAL

## 2022-07-14 DIAGNOSIS — D57.00 SICKLE CELL PAIN CRISIS (H): ICD-10-CM

## 2022-07-14 RX ORDER — OXYCODONE HYDROCHLORIDE 15 MG/1
15 TABLET ORAL EVERY 4 HOURS PRN
Qty: 45 TABLET | Refills: 0 | Status: SHIPPED | OUTPATIENT
Start: 2022-07-14 | End: 2022-07-21

## 2022-07-14 NOTE — TELEPHONE ENCOUNTER
Refill request    Oxycodone 15mg, max of 6 day    Last appt 07/11 with Dr. Duncan  Next appt 08/29 with Dr. Duncan    Note: runs out tomorrow

## 2022-07-14 NOTE — TELEPHONE ENCOUNTER
Prior Authorization Approval    Authorization Effective Date: 6/14/2022  Authorization Expiration Date: 7/14/2023  Medication: Deferasirox 360mg PA Approval  Approved Dose/Quantity: 120/30  Reference #: [id number]   Insurance Company: [employer] - Phone [phone] Fax [phone]  Expected CoPay: $0 - 15 ds     CoPay Card Available:      Foundation Assistance Needed:    Which Pharmacy is filling the prescription (Not needed for infusion/clinic administered): Count includes the [facility] *MAIL ONLY*  Pharmacy Notified: Yes  Patient Notified:          Lizeth Chavarria CPhT  [facility]  Oncology Pharmacy Liaison   [email or web address]  [phone] (phone  [phone] (fax       Normal

## 2022-07-15 ENCOUNTER — ONCOLOGY VISIT (OUTPATIENT)
Dept: ONCOLOGY | Facility: CLINIC | Age: 22
End: 2022-07-15
Attending: STUDENT IN AN ORGANIZED HEALTH CARE EDUCATION/TRAINING PROGRAM
Payer: COMMERCIAL

## 2022-07-15 ENCOUNTER — INFUSION THERAPY VISIT (OUTPATIENT)
Dept: INFUSION THERAPY | Facility: CLINIC | Age: 22
End: 2022-07-15
Attending: STUDENT IN AN ORGANIZED HEALTH CARE EDUCATION/TRAINING PROGRAM
Payer: COMMERCIAL

## 2022-07-15 ENCOUNTER — TELEPHONE (OUTPATIENT)
Dept: ONCOLOGY | Facility: CLINIC | Age: 22
End: 2022-07-15

## 2022-07-15 VITALS
DIASTOLIC BLOOD PRESSURE: 91 MMHG | OXYGEN SATURATION: 100 % | TEMPERATURE: 98.2 F | HEART RATE: 96 BPM | RESPIRATION RATE: 16 BRPM | BODY MASS INDEX: 29.18 KG/M2 | WEIGHT: 170 LBS | SYSTOLIC BLOOD PRESSURE: 134 MMHG

## 2022-07-15 VITALS
OXYGEN SATURATION: 96 % | SYSTOLIC BLOOD PRESSURE: 137 MMHG | DIASTOLIC BLOOD PRESSURE: 86 MMHG | TEMPERATURE: 98.1 F | HEART RATE: 96 BPM

## 2022-07-15 DIAGNOSIS — I69.351 HEMIPLEGIA AND HEMIPARESIS FOLLOWING CEREBRAL INFARCTION AFFECTING RIGHT DOMINANT SIDE (H): ICD-10-CM

## 2022-07-15 DIAGNOSIS — G81.10 SPASTIC HEMIPLEGIA, UNSPECIFIED ETIOLOGY, UNSPECIFIED LATERALITY (H): Primary | ICD-10-CM

## 2022-07-15 DIAGNOSIS — D57.00 SICKLE CELL PAIN CRISIS (H): ICD-10-CM

## 2022-07-15 PROCEDURE — 96374 THER/PROPH/DIAG INJ IV PUSH: CPT | Mod: 59

## 2022-07-15 PROCEDURE — 258N000003 HC RX IP 258 OP 636: Performed by: PEDIATRICS

## 2022-07-15 PROCEDURE — 96361 HYDRATE IV INFUSION ADD-ON: CPT | Mod: 59

## 2022-07-15 PROCEDURE — 96372 THER/PROPH/DIAG INJ SC/IM: CPT | Performed by: PEDIATRICS

## 2022-07-15 PROCEDURE — 250N000011 HC RX IP 250 OP 636: Performed by: PEDIATRICS

## 2022-07-15 PROCEDURE — 96376 TX/PRO/DX INJ SAME DRUG ADON: CPT

## 2022-07-15 PROCEDURE — G0463 HOSPITAL OUTPT CLINIC VISIT: HCPCS | Mod: 25

## 2022-07-15 PROCEDURE — 64644 CHEMODENERV 1 EXTREM 5/> MUS: CPT

## 2022-07-15 PROCEDURE — 64644 CHEMODENERV 1 EXTREM 5/> MUS: CPT | Performed by: STUDENT IN AN ORGANIZED HEALTH CARE EDUCATION/TRAINING PROGRAM

## 2022-07-15 PROCEDURE — 64616 CHEMODENERV MUSC NECK DYSTON: CPT

## 2022-07-15 PROCEDURE — 250N000020 HC RX IP 250 OP 636 J0585: Performed by: PEDIATRICS

## 2022-07-15 PROCEDURE — 95874 GUIDE NERV DESTR NEEDLE EMG: CPT | Performed by: STUDENT IN AN ORGANIZED HEALTH CARE EDUCATION/TRAINING PROGRAM

## 2022-07-15 RX ORDER — HEPARIN SODIUM (PORCINE) LOCK FLUSH IV SOLN 100 UNIT/ML 100 UNIT/ML
5 SOLUTION INTRAVENOUS
Status: DISCONTINUED | OUTPATIENT
Start: 2022-07-15 | End: 2022-07-15 | Stop reason: HOSPADM

## 2022-07-15 RX ORDER — HEPARIN SODIUM,PORCINE 10 UNIT/ML
5 VIAL (ML) INTRAVENOUS
Status: CANCELLED | OUTPATIENT
Start: 2022-10-01

## 2022-07-15 RX ORDER — ONDANSETRON 8 MG/1
8 TABLET, FILM COATED ORAL
Status: CANCELLED
Start: 2022-10-01

## 2022-07-15 RX ORDER — DIPHENHYDRAMINE HCL 25 MG
25 CAPSULE ORAL
Status: CANCELLED
Start: 2022-10-01

## 2022-07-15 RX ORDER — HEPARIN SODIUM (PORCINE) LOCK FLUSH IV SOLN 100 UNIT/ML 100 UNIT/ML
5 SOLUTION INTRAVENOUS
Status: CANCELLED | OUTPATIENT
Start: 2022-10-01

## 2022-07-15 RX ORDER — MORPHINE SULFATE 2 MG/ML
2 INJECTION, SOLUTION INTRAMUSCULAR; INTRAVENOUS
Status: CANCELLED
Start: 2022-10-01

## 2022-07-15 RX ORDER — MORPHINE SULFATE 2 MG/ML
2 INJECTION, SOLUTION INTRAMUSCULAR; INTRAVENOUS
Status: DISCONTINUED | OUTPATIENT
Start: 2022-07-15 | End: 2022-07-15 | Stop reason: HOSPADM

## 2022-07-15 RX ADMIN — DEXTROSE AND SODIUM CHLORIDE 1000 ML: 5; 450 INJECTION, SOLUTION INTRAVENOUS at 12:25

## 2022-07-15 RX ADMIN — MORPHINE SULFATE 2 MG: 2 INJECTION, SOLUTION INTRAMUSCULAR; INTRAVENOUS at 13:25

## 2022-07-15 RX ADMIN — ONABOTULINUMTOXINA 200 UNITS: 100 INJECTION, POWDER, LYOPHILIZED, FOR SOLUTION INTRADERMAL; INTRAMUSCULAR at 11:34

## 2022-07-15 RX ADMIN — MORPHINE SULFATE 2 MG: 2 INJECTION, SOLUTION INTRAMUSCULAR; INTRAVENOUS at 12:25

## 2022-07-15 RX ADMIN — Medication 5 ML: at 14:40

## 2022-07-15 RX ADMIN — MORPHINE SULFATE 2 MG: 2 INJECTION, SOLUTION INTRAMUSCULAR; INTRAVENOUS at 14:23

## 2022-07-15 ASSESSMENT — PAIN SCALES - GENERAL
PAINLEVEL: EXTREME PAIN (9)
PAINLEVEL: EXTREME PAIN (9)

## 2022-07-15 NOTE — PATIENT INSTRUCTIONS
Your received your next set of botox injections.  You will be scheduled for your next set of injections in 12 weeks.

## 2022-07-15 NOTE — TELEPHONE ENCOUNTER
Pt called in to triage requesting IVF pain meds for low back pain rated 9/10 x 2 days. Stated last took prn oxycodone at 0600  this morning without relief. Denied any fevers, chills, cough, sob, chest pain, numbness or tingling or other symptoms not typical of sickle cell pain.     Pt's last infusion was , last clinic visit 7/11/22 with follow-up on 8/29/22 with Dr. Duncan.     Patient states she has her own ride and has apt with Dr. Pierce from 11-12 today.    Pt denied being out of home medications and needing any refills today.     Pt qualifies for sickle cell standing order protocol.    If you do not hear from the infusion center by 2pm then you will not be able to get in for an infusion today.     Added to Infusion wait list.    [facility] has opening at 1200  Offered apt to Jennifer Rodriguez confirmed that she can come to that apt.  Updated Charge nurse in [facility]  Sent scheduling request to [facility]  Routed message to Dr. Duncan and Arely Krueger, MAURISIO

## 2022-07-15 NOTE — PROGRESS NOTES
PM&R Botox Procedure Note    Chief Complaint: Spastic Hemiparesis    BP (!) 134/91   Pulse 96   Temp 98.2  F (36.8  C) (Oral)   Resp 16   Wt 77.1 kg (170 lb)   SpO2 100%   BMI 29.18 kg/m         Current Outpatient Medications:      acetaminophen (TYLENOL) 325 MG tablet, Take 2 tablets (650 mg) by mouth every 6 hours as needed for mild pain, Disp: 120 tablet, Rfl: 3     albuterol (PROVENTIL) (2.5 MG/3ML) 0.083% neb solution, Take 1 vial (2.5 mg) by nebulization every 6 hours as needed for shortness of breath / dyspnea or wheezing, Disp: 12 mL, Rfl: 4     albuterol (PROVENTIL) (2.5 MG/3ML) 0.083% neb solution, Take 2 vials (5 mg) by nebulization every 6 hours as needed for shortness of breath / dyspnea or wheezing, Disp: 90 mL, Rfl: 3     aspirin (ASA) 81 MG chewable tablet, Take 1 tablet (81 mg) by mouth 2 times daily, Disp: 60 tablet, Rfl: 11     budesonide-formoterol (SYMBICORT) 160-4.5 MCG/ACT Inhaler, Inhale 2 puffs into the lungs 2 times daily, Disp: 10.2 g, Rfl: 3     deferasirox (JADENU) 360 MG tablet, Take 4 tablets (1,440 mg) by mouth every evening, Disp: 120 tablet, Rfl: 4     diphenhydrAMINE (BENADRYL) 25 MG capsule, Take 1-2 capsules (25-50 mg) by mouth nightly as needed for sleep, Disp: 60 capsule, Rfl: 3     EPINEPHrine (ANY BX GENERIC EQUIV) 0.3 MG/0.3ML injection 2-pack, Inject 0.3 mLs (0.3 mg) into the muscle as needed for anaphylaxis, Disp: 1 each, Rfl: 1     Hydroxyurea 1000 MG TABS, Take 3,000 mg by mouth daily, Disp: 90 tablet, Rfl: 3     ondansetron (ZOFRAN) 8 MG tablet, Take 1 tablet (8 mg) by mouth every 8 hours as needed, Disp: 30 tablet, Rfl: 1     oxyCODONE IR (ROXICODONE) 15 MG tablet, Take 1 tablet (15 mg) by mouth every 4 hours as needed for severe pain Goal 4 per day. Max 6 per day., Disp: 45 tablet, Rfl: 0    Current Facility-Administered Medications:      botulinum toxin type A (BOTOX) 100 units injection 400 Units, 400 Units, Intramuscular, Q90 Days, Eric Duncan MD,  200 Units at 07/15/22 1134        Allergies   Allergen Reactions     Contrast Dye      Hives and breathing issues     Fish-Derived Products Hives     Seafood Hives     Diagnostic X-Ray Materials      Gadolinium         PHYSICAL EXAM  Motor: 4+/5 with right shoulder abduction, 4/5 with right elbow flexion, unable to assess wrist flexion due to contracture. 4/5 with  strength on right. 5/5 left shoulder abduction, elbow extension, wrist extension and  strength/finger intrinsics. 4/5 with right hip flexion, 4/5 with right knee extension, 3/5 with right ankle dorsiflexion, 4+/5 with right EHL, plantar flexion. 5/5 with all muscle groups in left lower extremity.  ROM is 120 degrees with right shoulder abduction, about 130 degrees with right elbow extension.   Tone with MAS- 2/4 with right shoulder abduction, 3/4 with right finger flexors/intrinsics, 2/4 with right knee flexion. Significant right wrist contracture with minimal extension.  Sensory: intact to light touch throughout all dermatomes in bilateral lower extremities.      HPI  Last set of injections were on 4/15/22.  Patient has been ok since her last visit. Continues with range of motion exercises at home.   RESPONSE TO PREVIOUS TREATMENT:  Patient reports significant noticeable improvement in right upper and lower extremity range of motion since her last visit. About 60%. She states that she has not noticed increased wearing off of the effects of the medication.    BOTULINUM NEUROTOXIN INJECTION PROCEDURES:    VERIFICATION OF PATIENT IDENTIFICATION  Responsible Person:  RUIZ MARTIN: verified  Full Name: verified    INDICATIONS FOR PROCEDURES:  Jennifer Cervantes is a 21 year old patient with spasticity affecting the right upper extremity and right lower extremity secondary to a diagnosis of sickle cell disease and previous CVA with resulting spastic hemiparesis with associated pain, loss of joint motion, loss of volitional motor control, hygiene difficulty,  difficulty with activities of daily living and difficulty with ambulation and transfers.    Her baseline symptoms have been recalcitrant to oral medications and conservative therapy.  She is here today for reinjection with Botox.    GOAL OF PROCEDURE:  The goal of this procedure is to improve increase active range of motion, improve volitional motor control, decrease pain  and enhance functional independence associated with spasticity.    TOTAL DOSE ADMINISTERED:  Dose Administered:  200 units Botox (Botulinum Toxin Type A)       1:1 Dilution     Diluent Used:  Preservative Free Normal Saline  Total Volume of Diluent Used:  2 ml      CONSENT:  The risks, benefits, and treatment options were discussed with Jennifer Cervantes and she agreed to proceed.    EQUIPMENT USED:  Needle-37mm stimulating/recording  EMG/NCS Machine    SKIN PREPARATION:  Skin preparation was performed using an alcohol wipe.      GUIDANCE DESCRIPTION:  Electro-myographic guidance was necessary throughout the procedure to accurately identify all areas of spastic muscles while avoiding injection of non-spastic muscles and underlying muscles , neighboring nerves and nearby vascular structures.     AREA/MUSCLE INJECTED:  Right biceps- 40 U at 2 sites  Right BR- 30 U  Right pronator teres- 30 U  Right FDS- 25 U  Right FCR- 25 U  Right biceps femoris- 50 U at 2 sites    RESPONSE TO PROCEDURE:  Jennifer Cervantes tolerated the procedure well and there were no immediate complications. She was allowed to recover for an appropriate period of time and was discharged home in stable condition.       Jennifer Cervantes will follow up by phone with Dr. Pierce for any questions or concerns that may arise.  Jennifer Cervantes will be scheduled for the next series of injections in 12 weeks.

## 2022-07-15 NOTE — NURSING NOTE
"Oncology Rooming Note    July 15, 2022 11:13 AM   Jennifer Cervantes is a 22 year old female who presents for:    Chief Complaint   Patient presents with     Oncology Clinic Visit     Sickle Cell Anemia     Initial Vitals: BP (!) 134/91   Pulse 96   Temp 98.2  F (36.8  C) (Oral)   Resp 16   Wt 77.1 kg (170 lb)   SpO2 100%   BMI 29.18 kg/m   Estimated body mass index is 29.18 kg/m  as calculated from the following:    Height as of 3/20/22: 1.626 m (5' 4\").    Weight as of this encounter: 77.1 kg (170 lb). Body surface area is 1.87 meters squared.  Extreme Pain (9) Comment: Data Unavailable   No LMP recorded. Patient has had an injection.  Allergies reviewed: Yes  Medications reviewed: Yes    Medications: Medication refills not needed today.  Pharmacy name entered into EPIC: [facility] [place], [place] - [address] [place] [address] [phone]    Clinical concerns: Patient states there are no new concerns to discuss with provider.  Dr Pierce was not notified.         [place] CMA              "

## 2022-07-15 NOTE — LETTER
7/15/2022         RE: Jennifer Cervantes  [address]  [facility] [address]        Dear Colleague,    Thank you for referring your patient, Jennifer Cervantes, to the [facility]. Please see a copy of my visit note below.    PM&R Botox Procedure Note    Chief Complaint: Spastic Hemiparesis    BP (!) 134/91   Pulse 96   Temp 98.2  F (36.8  C) (Oral)   Resp 16   Wt 77.1 kg (170 lb)   SpO2 100%   BMI 29.18 kg/m         Current Outpatient Medications:      acetaminophen (TYLENOL) 325 MG tablet, Take 2 tablets (650 mg) by mouth every 6 hours as needed for mild pain, Disp: 120 tablet, Rfl: 3     albuterol (PROVENTIL) (2.5 MG/3ML) 0.083% neb solution, Take 1 vial (2.5 mg) by nebulization every 6 hours as needed for shortness of breath / dyspnea or wheezing, Disp: 12 mL, Rfl: 4     albuterol (PROVENTIL) (2.5 MG/3ML) 0.083% neb solution, Take 2 vials (5 mg) by nebulization every 6 hours as needed for shortness of breath / dyspnea or wheezing, Disp: 90 mL, Rfl: 3     aspirin (ASA) 81 MG chewable tablet, Take 1 tablet (81 mg) by mouth 2 times daily, Disp: 60 tablet, Rfl: 11     budesonide-formoterol (SYMBICORT) 160-4.5 MCG/ACT Inhaler, Inhale 2 puffs into the lungs 2 times daily, Disp: 10.2 g, Rfl: 3     deferasirox (JADENU) 360 MG tablet, Take 4 tablets (1,440 mg) by mouth every evening, Disp: 120 tablet, Rfl: 4     diphenhydrAMINE (BENADRYL) 25 MG capsule, Take 1-2 capsules (25-50 mg) by mouth nightly as needed for sleep, Disp: 60 capsule, Rfl: 3     EPINEPHrine (ANY BX GENERIC EQUIV) 0.3 MG/0.3ML injection 2-pack, Inject 0.3 mLs (0.3 mg) into the muscle as needed for anaphylaxis, Disp: 1 each, Rfl: 1     Hydroxyurea 1000 MG TABS, Take 3,000 mg by mouth daily, Disp: 90 tablet, Rfl: 3     ondansetron (ZOFRAN) 8 MG tablet, Take 1 tablet (8 mg) by mouth every 8 hours as needed, Disp: 30 tablet, Rfl: 1     oxyCODONE IR (ROXICODONE) 15 MG tablet, Take 1 tablet (15 mg) by mouth every 4 hours as  needed for severe pain Goal 4 per day. Max 6 per day., Disp: 45 tablet, Rfl: 0    Current Facility-Administered Medications:      botulinum toxin type A (BOTOX) 100 units injection 400 Units, 400 Units, Intramuscular, Q90 Days, Eric Duncan MD, 200 Units at 07/15/22 1134        Allergies   Allergen Reactions     Contrast Dye      Hives and breathing issues     Fish-Derived Products Hives     Seafood Hives     Diagnostic X-Ray Materials      Gadolinium         PHYSICAL EXAM  Motor: 4+/5 with right shoulder abduction, 4/5 with right elbow flexion, unable to assess wrist flexion due to contracture. 4/5 with  strength on right. 5/5 left shoulder abduction, elbow extension, wrist extension and  strength/finger intrinsics. 4/5 with right hip flexion, 4/5 with right knee extension, 3/5 with right ankle dorsiflexion, 4+/5 with right EHL, plantar flexion. 5/5 with all muscle groups in left lower extremity.  ROM is 120 degrees with right shoulder abduction, about 130 degrees with right elbow extension.   Tone with MAS- 2/4 with right shoulder abduction, 3/4 with right finger flexors/intrinsics, 2/4 with right knee flexion. Significant right wrist contracture with minimal extension.  Sensory: intact to light touch throughout all dermatomes in bilateral lower extremities.      HPI  Last set of injections were on 4/15/22.  Patient has been ok since her last visit. Continues with range of motion exercises at home.   RESPONSE TO PREVIOUS TREATMENT:  Patient reports significant noticeable improvement in right upper and lower extremity range of motion since her last visit. About 60%. She states that she has not noticed increased wearing off of the effects of the medication.    BOTULINUM NEUROTOXIN INJECTION PROCEDURES:    VERIFICATION OF PATIENT IDENTIFICATION  Responsible Person:  RUIZ DENNISB: verified  Full Name: verified    INDICATIONS FOR PROCEDURES:  Jennifer Cervantes is a 21 year old patient with spasticity affecting  the right upper extremity and right lower extremity secondary to a diagnosis of sickle cell disease and previous CVA with resulting spastic hemiparesis with associated pain, loss of joint motion, loss of volitional motor control, hygiene difficulty, difficulty with activities of daily living and difficulty with ambulation and transfers.    Her baseline symptoms have been recalcitrant to oral medications and conservative therapy.  She is here today for reinjection with Botox.    GOAL OF PROCEDURE:  The goal of this procedure is to improve increase active range of motion, improve volitional motor control, decrease pain  and enhance functional independence associated with spasticity.    TOTAL DOSE ADMINISTERED:  Dose Administered:  200 units Botox (Botulinum Toxin Type A)       1:1 Dilution     Diluent Used:  Preservative Free Normal Saline  Total Volume of Diluent Used:  2 ml      CONSENT:  The risks, benefits, and treatment options were discussed with Jennifer Cervantes and she agreed to proceed.    EQUIPMENT USED:  Needle-37mm stimulating/recording  EMG/NCS Machine    SKIN PREPARATION:  Skin preparation was performed using an alcohol wipe.      GUIDANCE DESCRIPTION:  Electro-myographic guidance was necessary throughout the procedure to accurately identify all areas of spastic muscles while avoiding injection of non-spastic muscles and underlying muscles , neighboring nerves and nearby vascular structures.     AREA/MUSCLE INJECTED:  Right biceps- 40 U at 2 sites  Right BR- 30 U  Right pronator teres- 30 U  Right FDS- 25 U  Right FCR- 25 U  Right biceps femoris- 50 U at 2 sites    RESPONSE TO PROCEDURE:  Jennifer Cervantes tolerated the procedure well and there were no immediate complications. She was allowed to recover for an appropriate period of time and was discharged home in stable condition.       Jennifer Cervantes will follow up by phone with Dr. Pierce for any questions or concerns that may arise.  Jennifer Cervantes will be  scheduled for the next series of injections in 12 weeks.        Again, thank you for allowing me to participate in the care of your patient.      Sincerely,    Katherine Pierce MD

## 2022-07-15 NOTE — LETTER
7/15/2022         RE: Jennifer Cervantes  [address]  [facility] [address]        Dear Colleague,    Thank you for referring your patient, Jennifer Cervantes, to the [facility]. Please see a copy of my visit note below.    Infusion Nursing Note:  Jennifer Cervantes presents today for IVF/pain meds.    Patient seen by provider today: No   present during visit today: Not Applicable.    Note: Patient arrived to [facility] at 9/10 lower back pain. 1L D51/2NS administered over 2 hours. 2mg IV morphine given Q1hr x3. Pain decreased to 6/10 by discharge.    Intravenous Access:  Implanted Port.    Treatment Conditions:  Not Applicable.    Post Infusion Assessment:  Patient tolerated infusion without incident.  Blood return noted pre and post infusion.  Site patent and intact, free from redness, edema or discomfort.  No evidence of extravasations.  Access discontinued per protocol.     Discharge Plan:   AVS to patient via MYCHART.  Patient will return PRN for next appointment.   Patient discharged in stable condition accompanied by: self.  Departure Mode: Ambulatory.        Cristela Laguerre RN                          Again, thank you for allowing me to participate in the care of your patient.        Sincerely,        [facility]

## 2022-07-15 NOTE — PROGRESS NOTES
Infusion Nursing Note:  Jennifer Cervantes presents today for IVF/pain meds.    Patient seen by provider today: No   present during visit today: Not Applicable.    Note: Patient arrived to [facility] at 9/10 lower back pain. 1L D51/2NS administered over 2 hours. 2mg IV morphine given Q1hr x3. Pain decreased to 6/10 by discharge.    Intravenous Access:  Implanted Port.    Treatment Conditions:  Not Applicable.    Post Infusion Assessment:  Patient tolerated infusion without incident.  Blood return noted pre and post infusion.  Site patent and intact, free from redness, edema or discomfort.  No evidence of extravasations.  Access discontinued per protocol.     Discharge Plan:   AVS to patient via MYCHART.  Patient will return PRN for next appointment.   Patient discharged in stable condition accompanied by: self.  Departure Mode: Ambulatory.        Cristela Laguerre RN

## 2022-07-18 ENCOUNTER — INFUSION THERAPY VISIT (OUTPATIENT)
Dept: TRANSPLANT | Facility: CLINIC | Age: 22
End: 2022-07-18
Attending: PEDIATRICS
Payer: COMMERCIAL

## 2022-07-18 ENCOUNTER — PATIENT OUTREACH (OUTPATIENT)
Dept: CARE COORDINATION | Facility: CLINIC | Age: 22
End: 2022-07-18

## 2022-07-18 ENCOUNTER — TELEPHONE (OUTPATIENT)
Dept: ONCOLOGY | Facility: CLINIC | Age: 22
End: 2022-07-18

## 2022-07-18 VITALS
DIASTOLIC BLOOD PRESSURE: 78 MMHG | TEMPERATURE: 98.6 F | OXYGEN SATURATION: 93 % | RESPIRATION RATE: 20 BRPM | HEART RATE: 105 BPM | SYSTOLIC BLOOD PRESSURE: 133 MMHG

## 2022-07-18 DIAGNOSIS — D57.00 SICKLE CELL PAIN CRISIS (H): ICD-10-CM

## 2022-07-18 DIAGNOSIS — G81.10 SPASTIC HEMIPLEGIA, UNSPECIFIED ETIOLOGY, UNSPECIFIED LATERALITY (H): Primary | ICD-10-CM

## 2022-07-18 PROCEDURE — 250N000011 HC RX IP 250 OP 636: Performed by: PEDIATRICS

## 2022-07-18 PROCEDURE — 96374 THER/PROPH/DIAG INJ IV PUSH: CPT

## 2022-07-18 PROCEDURE — 96376 TX/PRO/DX INJ SAME DRUG ADON: CPT

## 2022-07-18 PROCEDURE — 96361 HYDRATE IV INFUSION ADD-ON: CPT

## 2022-07-18 PROCEDURE — 258N000003 HC RX IP 258 OP 636: Performed by: PEDIATRICS

## 2022-07-18 RX ORDER — DIPHENHYDRAMINE HCL 25 MG
25 CAPSULE ORAL
Status: CANCELLED
Start: 2022-10-01

## 2022-07-18 RX ORDER — MORPHINE SULFATE 2 MG/ML
2 INJECTION, SOLUTION INTRAMUSCULAR; INTRAVENOUS
Status: CANCELLED
Start: 2022-10-01

## 2022-07-18 RX ORDER — MORPHINE SULFATE 2 MG/ML
2 INJECTION, SOLUTION INTRAMUSCULAR; INTRAVENOUS
Status: DISCONTINUED | OUTPATIENT
Start: 2022-07-18 | End: 2022-07-18 | Stop reason: HOSPADM

## 2022-07-18 RX ORDER — ONDANSETRON 8 MG/1
8 TABLET, FILM COATED ORAL
Status: CANCELLED
Start: 2022-10-01

## 2022-07-18 RX ORDER — HEPARIN SODIUM (PORCINE) LOCK FLUSH IV SOLN 100 UNIT/ML 100 UNIT/ML
5 SOLUTION INTRAVENOUS EVERY 8 HOURS
Status: DISCONTINUED | OUTPATIENT
Start: 2022-07-18 | End: 2022-07-18 | Stop reason: HOSPADM

## 2022-07-18 RX ORDER — HEPARIN SODIUM,PORCINE 10 UNIT/ML
5 VIAL (ML) INTRAVENOUS
Status: CANCELLED | OUTPATIENT
Start: 2022-10-01

## 2022-07-18 RX ORDER — HEPARIN SODIUM (PORCINE) LOCK FLUSH IV SOLN 100 UNIT/ML 100 UNIT/ML
5 SOLUTION INTRAVENOUS
Status: CANCELLED | OUTPATIENT
Start: 2022-10-01

## 2022-07-18 RX ADMIN — MORPHINE SULFATE 2 MG: 2 INJECTION, SOLUTION INTRAMUSCULAR; INTRAVENOUS at 13:07

## 2022-07-18 RX ADMIN — MORPHINE SULFATE 2 MG: 2 INJECTION, SOLUTION INTRAMUSCULAR; INTRAVENOUS at 14:06

## 2022-07-18 RX ADMIN — DEXTROSE AND SODIUM CHLORIDE 1000 ML: 5; 450 INJECTION, SOLUTION INTRAVENOUS at 13:07

## 2022-07-18 RX ADMIN — SODIUM CHLORIDE, PRESERVATIVE FREE 5 ML: 5 INJECTION INTRAVENOUS at 15:21

## 2022-07-18 RX ADMIN — MORPHINE SULFATE 2 MG: 2 INJECTION, SOLUTION INTRAMUSCULAR; INTRAVENOUS at 15:12

## 2022-07-18 ASSESSMENT — PAIN SCALES - GENERAL: PAINLEVEL: EXTREME PAIN (8)

## 2022-07-18 NOTE — TELEPHONE ENCOUNTER
Pt called in to triage requesting IVF pain meds for low back pain rated 9/10 x 2 days. Stated last took prn Oxycodone @6am this morning without relief. Denied any fevers, chills, cough, sob, chest pain, numbness or tingling or other symptoms not typical of sickle cell pain.   Pt's last infusion was 7/15/22, last clinic visit 7/11/22 with follow-up on 8/29/22  with Dr Duncan .   Patient states they do not have own ride and it will take 60 minutes long to get to cancer clinic.   Pt denied being out of home medications and needing any refills today.   Pt qualifies for sickle cell standing order protocol.  If you do not hear from the infusion center by 2pm then you will not be able to get in for an infusion today.   Please note, if you are late for your appt, you risk losing your infusion appt as it may delay another patient's infusion who arrived on time.

## 2022-07-18 NOTE — PROGRESS NOTES
Social Work Note: Telephone Call  Oncology Clinic     Data/Intervention:  Patient Name:  Jennifer Cervantes DOB/Age: 2000     Call From: [facility]        Reason for Call:  Transportation     Assessment:   called [employer] to arrange ride through patient's insurance. [employer] arranged  for patient from home with [employer] ([phone]).  Patient will need to call when ready for return ride home ([phone]).      Plan:  Patient is aware of the transportation plan.  available to assist with any other needs.      CARLOS Chavez,[facility]  Hematology/Oncology Social Worker  Phone:[phone] Pager: [phone]

## 2022-07-18 NOTE — PROGRESS NOTES
Infusion Nursing Note:  Jennifer Cervantes presents today for add-on infusion.    Patient seen by provider today: No   present during visit today: Not Applicable.     Note: Patient received 1 L D5NaCl bolus over 2 hours, morphine x3 per generic infusion plan. Patient arrived with 9/10 low back pain. Pain improved to 6/10 after interventions. Stable upon discharge.     Intravenous Access:  Implanted Port.     Treatment Conditions:  Results reviewed, labs MET treatment parameters, ok to proceed with treatment.     Post Infusion Assessment:  Patient tolerated infusion without incident.      Discharge Plan:   Patient and/or family verbalized understanding of discharge instructions and all questions answered.

## 2022-07-18 NOTE — TELEPHONE ENCOUNTER
&:47 BMT can take Jennifer at 1:00pm for IVF/Pain meds, call placed to Jennifer to let her know to confirm appt time, she states she can make it.     Message sent to social work to arrange transportation.     Message sent to scheduling to set up appt.

## 2022-07-21 DIAGNOSIS — D57.00 SICKLE CELL PAIN CRISIS (H): ICD-10-CM

## 2022-07-21 RX ORDER — OXYCODONE HYDROCHLORIDE 15 MG/1
15 TABLET ORAL EVERY 4 HOURS PRN
Qty: 45 TABLET | Refills: 0 | Status: SHIPPED | OUTPATIENT
Start: 2022-07-21 | End: 2022-07-28

## 2022-07-21 NOTE — PROGRESS NOTES
This is a recent snapshot of the patient's [facility] medical record.  For current drug dose and complete information and questions, call [phone] or In Basket pool, fv home infusion ([id number])  CSN Number:  [id number]

## 2022-07-22 ENCOUNTER — TELEPHONE (OUTPATIENT)
Dept: ONCOLOGY | Facility: CLINIC | Age: 22
End: 2022-07-22

## 2022-07-22 ENCOUNTER — PATIENT OUTREACH (OUTPATIENT)
Dept: CARE COORDINATION | Facility: CLINIC | Age: 22
End: 2022-07-22

## 2022-07-22 ENCOUNTER — INFUSION THERAPY VISIT (OUTPATIENT)
Dept: TRANSPLANT | Facility: CLINIC | Age: 22
End: 2022-07-22
Attending: PEDIATRICS
Payer: COMMERCIAL

## 2022-07-22 VITALS
HEART RATE: 105 BPM | DIASTOLIC BLOOD PRESSURE: 79 MMHG | RESPIRATION RATE: 16 BRPM | TEMPERATURE: 98.5 F | SYSTOLIC BLOOD PRESSURE: 134 MMHG | OXYGEN SATURATION: 93 %

## 2022-07-22 DIAGNOSIS — D57.00 SICKLE CELL PAIN CRISIS (H): ICD-10-CM

## 2022-07-22 DIAGNOSIS — G81.10 SPASTIC HEMIPLEGIA, UNSPECIFIED ETIOLOGY, UNSPECIFIED LATERALITY (H): Primary | ICD-10-CM

## 2022-07-22 PROCEDURE — 250N000011 HC RX IP 250 OP 636: Performed by: PEDIATRICS

## 2022-07-22 PROCEDURE — 258N000003 HC RX IP 258 OP 636: Performed by: PEDIATRICS

## 2022-07-22 PROCEDURE — 96375 TX/PRO/DX INJ NEW DRUG ADDON: CPT

## 2022-07-22 PROCEDURE — 96365 THER/PROPH/DIAG IV INF INIT: CPT

## 2022-07-22 PROCEDURE — 96366 THER/PROPH/DIAG IV INF ADDON: CPT

## 2022-07-22 PROCEDURE — 96376 TX/PRO/DX INJ SAME DRUG ADON: CPT

## 2022-07-22 RX ORDER — HEPARIN SODIUM (PORCINE) LOCK FLUSH IV SOLN 100 UNIT/ML 100 UNIT/ML
5 SOLUTION INTRAVENOUS
Status: CANCELLED | OUTPATIENT
Start: 2022-10-01

## 2022-07-22 RX ORDER — HEPARIN SODIUM,PORCINE 10 UNIT/ML
5 VIAL (ML) INTRAVENOUS
Status: CANCELLED | OUTPATIENT
Start: 2022-10-01

## 2022-07-22 RX ORDER — MORPHINE SULFATE 2 MG/ML
2 INJECTION, SOLUTION INTRAMUSCULAR; INTRAVENOUS
Status: CANCELLED
Start: 2022-10-01

## 2022-07-22 RX ORDER — MORPHINE SULFATE 2 MG/ML
2 INJECTION, SOLUTION INTRAMUSCULAR; INTRAVENOUS
Status: DISCONTINUED | OUTPATIENT
Start: 2022-07-22 | End: 2022-07-22 | Stop reason: HOSPADM

## 2022-07-22 RX ORDER — DIPHENHYDRAMINE HCL 25 MG
25 CAPSULE ORAL
Status: CANCELLED
Start: 2022-10-01

## 2022-07-22 RX ORDER — ONDANSETRON 8 MG/1
8 TABLET, FILM COATED ORAL
Status: CANCELLED
Start: 2022-10-01

## 2022-07-22 RX ADMIN — DEXTROSE AND SODIUM CHLORIDE 1000 ML: 5; 450 INJECTION, SOLUTION INTRAVENOUS at 12:09

## 2022-07-22 RX ADMIN — MORPHINE SULFATE 2 MG: 2 INJECTION, SOLUTION INTRAMUSCULAR; INTRAVENOUS at 13:23

## 2022-07-22 RX ADMIN — MORPHINE SULFATE 2 MG: 2 INJECTION, SOLUTION INTRAMUSCULAR; INTRAVENOUS at 12:09

## 2022-07-22 RX ADMIN — MORPHINE SULFATE 2 MG: 2 INJECTION, SOLUTION INTRAMUSCULAR; INTRAVENOUS at 14:29

## 2022-07-22 ASSESSMENT — PAIN SCALES - GENERAL: PAINLEVEL: EXTREME PAIN (9)

## 2022-07-22 NOTE — TELEPHONE ENCOUNTER
BMT can offer apt at 1230 and Jennifer confirmed she can come to the apt.   Updated BMT charge nurse.  Message sent to CCOD to schedule.  Message sent to SW to assist with transportation.    Routed to Dr. Duncan and Arely Krueger, JOECC

## 2022-07-22 NOTE — LETTER
Date:July 22, 2022      Provider requested that no letter be sent. Do not send.       [facility]

## 2022-07-22 NOTE — LETTER
7/22/2022         RE: Jennifer Cervantes  [address]  [place] [address]        Dear Colleague,    Thank you for referring your patient, Jennifer Cervantes, to the [facility] BLOOD AND MARROW TRANSPLANT PROGRAM [place]. Please see a copy of my visit note below.    Infusion Nursing Note:  Jennifer Cervantes presents today for add on IVF and pain meds.    Patient seen by provider today: No   present during visit today: Not Applicable.    Note: No labs/no provider visit today. Given 1L NS over 2 hours. 2gm IVP Morphine given Q1HR for max 3 doses equaling 6mg. Infusion completed without complication.    Intravenous Access:  Implanted Port.  De-accessed prior to discharge.    Treatment Conditions:  Not Applicable.    Post Infusion Assessment:  Patient tolerated infusion without incident.     Discharge Plan:   Patient discharged in stable condition accompanied by: self.      Allison Wiggins RN                          Again, thank you for allowing me to participate in the care of your patient.        Sincerely,        [facility]

## 2022-07-22 NOTE — TELEPHONE ENCOUNTER
Pt called in to triage requesting IVF pain meds for back pain rated 9/10 x 2 days. Stated last took prn oxycodone at 0600 this morning without relief. Denied any fevers, chills, cough, sob, chest pain, numbness or tingling or other symptoms not typical of sickle cell pain.     Pt's last infusion was 7/18, last clinic visit 7/11/22 with follow-up on 8/29/22 with Dr. Duncan.     Patient states needs ride, 25 minutes    Pt denied being out of home medications and needing any refills today.     Pt qualifies for sickle cell standing order protocol.    If you do not hear from the infusion center by 2pm then you will not be able to get in for an infusion today.     Added to infusion wait list.

## 2022-07-22 NOTE — PROGRESS NOTES
Social Work Note: Telephone Call  Oncology Clinic     Data/Intervention:  Patient Name:  Jennifer Cervantes   /Age: 2000, 22 years old     Call From: [facility]riage         Reason for Call:  Transportation     Assessment:   called [employer] to arrange ride through patient's insurance. [employer] arranged  for patient from home with [employer] ([phone]).  Patient will need to call when ready for return ride home ([phone]).      Plan:  Patient is aware of the transportation plan.  available to assist with any other needs.      CARLOS Chavez,LGSW  Hematology/Oncology Social Worker  Phone:[phone] Pager: [phone]

## 2022-07-22 NOTE — PROGRESS NOTES
Infusion Nursing Note:  Jennifer Cervantes presents today for add on IVF and pain meds.    Patient seen by provider today: No   present during visit today: Not Applicable.    Note: No labs/no provider visit today. Given 1L NS over 2 hours. 2gm IVP Morphine given Q1HR for max 3 doses equaling 6mg. Infusion completed without complication.    Intravenous Access:  Implanted Port.  De-accessed prior to discharge.    Treatment Conditions:  Not Applicable.    Post Infusion Assessment:  Patient tolerated infusion without incident.     Discharge Plan:   Patient discharged in stable condition accompanied by: self.      Allison Wiggins RN

## 2022-07-25 ENCOUNTER — PATIENT OUTREACH (OUTPATIENT)
Dept: CARE COORDINATION | Facility: CLINIC | Age: 22
End: 2022-07-25

## 2022-07-25 ENCOUNTER — PATIENT OUTREACH (OUTPATIENT)
Dept: ONCOLOGY | Facility: CLINIC | Age: 22
End: 2022-07-25

## 2022-07-25 ENCOUNTER — TELEPHONE (OUTPATIENT)
Dept: ONCOLOGY | Facility: CLINIC | Age: 22
End: 2022-07-25

## 2022-07-25 ENCOUNTER — INFUSION THERAPY VISIT (OUTPATIENT)
Dept: TRANSPLANT | Facility: CLINIC | Age: 22
End: 2022-07-25
Attending: PEDIATRICS
Payer: COMMERCIAL

## 2022-07-25 VITALS
SYSTOLIC BLOOD PRESSURE: 156 MMHG | RESPIRATION RATE: 20 BRPM | DIASTOLIC BLOOD PRESSURE: 98 MMHG | OXYGEN SATURATION: 96 % | TEMPERATURE: 98.9 F | HEART RATE: 112 BPM

## 2022-07-25 DIAGNOSIS — D57.00 SICKLE CELL PAIN CRISIS (H): ICD-10-CM

## 2022-07-25 DIAGNOSIS — G81.10 SPASTIC HEMIPLEGIA, UNSPECIFIED ETIOLOGY, UNSPECIFIED LATERALITY (H): Primary | ICD-10-CM

## 2022-07-25 PROCEDURE — 96361 HYDRATE IV INFUSION ADD-ON: CPT

## 2022-07-25 PROCEDURE — 258N000003 HC RX IP 258 OP 636: Performed by: PEDIATRICS

## 2022-07-25 PROCEDURE — 96374 THER/PROPH/DIAG INJ IV PUSH: CPT

## 2022-07-25 PROCEDURE — 96376 TX/PRO/DX INJ SAME DRUG ADON: CPT

## 2022-07-25 PROCEDURE — 250N000011 HC RX IP 250 OP 636: Performed by: PEDIATRICS

## 2022-07-25 RX ORDER — MORPHINE SULFATE 2 MG/ML
2 INJECTION, SOLUTION INTRAMUSCULAR; INTRAVENOUS
Status: DISCONTINUED | OUTPATIENT
Start: 2022-07-25 | End: 2022-07-25 | Stop reason: HOSPADM

## 2022-07-25 RX ORDER — HEPARIN SODIUM,PORCINE 10 UNIT/ML
5 VIAL (ML) INTRAVENOUS
Status: CANCELLED | OUTPATIENT
Start: 2022-10-01

## 2022-07-25 RX ORDER — HEPARIN SODIUM (PORCINE) LOCK FLUSH IV SOLN 100 UNIT/ML 100 UNIT/ML
5 SOLUTION INTRAVENOUS
Status: CANCELLED | OUTPATIENT
Start: 2022-10-01

## 2022-07-25 RX ORDER — DIPHENHYDRAMINE HCL 25 MG
25 CAPSULE ORAL
Status: CANCELLED
Start: 2022-10-01

## 2022-07-25 RX ORDER — ONDANSETRON 8 MG/1
8 TABLET, FILM COATED ORAL
Status: CANCELLED
Start: 2022-10-01

## 2022-07-25 RX ORDER — MORPHINE SULFATE 2 MG/ML
2 INJECTION, SOLUTION INTRAMUSCULAR; INTRAVENOUS
Status: CANCELLED
Start: 2022-10-01

## 2022-07-25 RX ADMIN — DEXTROSE AND SODIUM CHLORIDE 1000 ML: 5; 450 INJECTION, SOLUTION INTRAVENOUS at 12:48

## 2022-07-25 RX ADMIN — MORPHINE SULFATE 2 MG: 2 INJECTION, SOLUTION INTRAMUSCULAR; INTRAVENOUS at 13:47

## 2022-07-25 RX ADMIN — MORPHINE SULFATE 2 MG: 2 INJECTION, SOLUTION INTRAMUSCULAR; INTRAVENOUS at 12:47

## 2022-07-25 RX ADMIN — MORPHINE SULFATE 2 MG: 2 INJECTION, SOLUTION INTRAMUSCULAR; INTRAVENOUS at 14:50

## 2022-07-25 ASSESSMENT — ACTIVITIES OF DAILY LIVING (ADL): DEPENDENT_IADLS:: INDEPENDENT

## 2022-07-25 ASSESSMENT — PAIN SCALES - GENERAL: PAINLEVEL: EXTREME PAIN (8)

## 2022-07-25 NOTE — PROGRESS NOTES
This is a recent snapshot of the patient's [facility] medical record.  For current drug dose and complete information and questions, call [phone] or In Basket pool, fv home infusion ([id number])  CSN Number:  [id number]

## 2022-07-25 NOTE — LETTER
7/25/2022         RE: Jennifer Cervantes  [address]  [facility] [address]        Dear Colleague,    Thank you for referring your patient, Jennifer Cervantes, to the [facility] BLOOD AND MARROW TRANSPLANT PROGRAM [place]. Please see a copy of my visit note below.    Infusion Nursing Note:  Jennifer Cervantes presents today for IVF and pain medications.    Patient seen by provider today: No   present during visit today: Not Applicable.    Note: Per Generic Plan.    Intravenous Access:  Implanted Port.    Treatment Conditions:  Pt received 1L D5 with 1/2 NS over 2 hours. Pt also received 2 mg morphine x3 for pain. Pt tolerated well.     Post Infusion Assessment:  Patient tolerated infusion without incident.     Discharge Plan:   Patient discharged in stable condition accompanied by: self.       Maria M Gonzalez RN                          Again, thank you for allowing me to participate in the care of your patient.        Sincerely,        [facility]

## 2022-07-25 NOTE — TELEPHONE ENCOUNTER
Pt called in to triage requesting IVF pain meds for low back pain rated 8/10 x 2 days. Stated last took prn Oxycodone @6am this morning without relief. Denied any fevers, chills, cough, sob, chest pain, numbness or tingling or other symptoms not typical of sickle cell pain.   Pt's last infusion was 7/22/22, last clinic visit 7/11/22 with follow-up on 8/29/22  with Dr Duncan .   Patient states they do not have own ride and it will take 60 minutes long to get to cancer clinic.   Pt denied being out of home medications and needing any refills today.   Pt qualifies for sickle cell standing order protocol.  If you do not hear from the infusion center by 2pm then you will not be able to get in for an infusion today.   Please note, if you are late for your appt, you risk losing your infusion appt as it may delay another patient's infusion who arrived on time.     8:10 call placed to Jennifer to let her know there is a 1:00pm availability, she is fine with coming in at that time.     Message sent to social work to arrange ride and to scheduling to set up appointment

## 2022-07-25 NOTE — PROGRESS NOTES
07/25/22 1519   OTHER   Cancer Care Care Coordination Status Enrolled    [facility] Initial Note                                    Discussion with Patient:                                                     On 7/15/22 office visit for Botox injections related to spasticity of Right extremities, she noted she still has not received the braces recommended by .     A flyer with the contact info for orthotics was mailed to her.    On 7/25/22 she called as the orthotics department stated they did not have any orders.    Assessment:                                                      Initial  Current living arrangement:: I live in a private home with family (Mom, 3 siblings and nephew)  Informal Support system:: Family;Friends  Bed or wheelchair confined:: No  Mobility Status: Independent  Transportation means:: Medical transport  Advanced Care Plans/Directives on file:: No         Care Management       Care Mgmt General Assessment  Referral  Referral Source: Care Team     Living Situation  Current living arrangement:: I live in a private home with family (Mom, 3 siblings and nephew)  Type of residence:: Private home - no stairs (unclear on stairs)  Resources  Patient receiving home care services:: No  Community Resources: DME (Orthotics for RUE and RLE braces)  Supplies Currently Used at Home: None  Equipment Currently Used at Home: none  Employment Status: disabled  Psychosocial  Jainism or spiritual beliefs that impact treatment:: No  Mental health DX:: No  Chemical Dependency Status: No Current Concerns  Informal Support system:: Family;Friends  Functional Status  Dependent ADLs:: Independent (Needs help with hair/ Ie braiding etc)  Dependent IADLs:: Independent  Bed or wheelchair confined:: No  Mobility Status: Independent  Advance Care Plan/Directive  Advanced Care Plans/Directives on file:: No and     Care Mgmt Encounter Assessment      Clinic Utilization  Difficulty keeping appointments:: No  Compliance Concerns: No  No-Show Concerns: No  Transportation  Transportation means:: Medical transport     Primary Diagnosis  Primary Diagnosis: Other (include Comment box) (Hemiplegia and hemiparesis following cerebral infarction affecting right dominant side (H))  Barriers in Communication  Other concerns:: None  How confident are you filling out medical forms by yourself:: Quite a bit  Pain  Pain (GOAL):: Yes (addressed by hematology team)  Type: Acute on Chronic  Radiating: No  Alleviating Factors: Pain Medication (Outpatient infusions)          Intervention/Education provided during outreach:                                                       Orders for RUE and RLE refaxed to Orthotics department.  Updated patient and advised to call them tomorrow to schedule a fitting     Next appointment for botox  10.12.2022    [facility]    Mandie  Nurse Coordinator  Dr.Florence Pierce's office  Physical Medicine and Rehabilitation  [phone]

## 2022-07-25 NOTE — PROGRESS NOTES
Infusion Nursing Note:  Jennifer Cervantes presents today for IVF and pain medications.    Patient seen by provider today: No   present during visit today: Not Applicable.    Note: Per Generic Plan.    Intravenous Access:  Implanted Port.    Treatment Conditions:  Pt received 1L D5 with 1/2 NS over 2 hours. Pt also received 2 mg morphine x3 for pain. Pt tolerated well.     Post Infusion Assessment:  Patient tolerated infusion without incident.     Discharge Plan:   Patient discharged in stable condition accompanied by: self.       Maria M Gonzalez RN

## 2022-07-25 NOTE — PROGRESS NOTES
This is a recent snapshot of the patient's [facility] medical record.  For current drug dose and complete information and questions, call [phone] or In Basket pool, fv home infusion ([id number])  CSN Number:  [id number]

## 2022-07-25 NOTE — PROGRESS NOTES
Social Work Note: Telephone Call  Oncology Clinic     Data/Intervention:  Patient Name:  Jennifer Cervantes  /Age: 2000, 22 years old     Call From: [facility]riage        Reason for Call:  Transportation     Assessment:   called [employer] to arrange ride through patient's insurance. [employer]  arranged  for patient from home with [employer] ([phone]).  Patient will need to call when ready for return ride home ([phone]).      Plan:  Patient is aware of the transportation plan.  available to assist with any other needs.      CARLOS Chavez,LGSW  Hematology/Oncology Social Worker  Phone:[phone] Pager: [phone]

## 2022-07-25 NOTE — NURSING NOTE
"Oncology Rooming Note    July 25, 2022 1:55 PM   Jennifer Cervantes is a 22 year old female who presents for:    Chief Complaint   Patient presents with     Infusion     Add on Infusion r/t Sickle Cell Disease.     Initial Vitals: BP (!) 156/98 (BP Location: Right arm, Patient Position: Sitting)   Pulse 112   Temp 98.9  F (37.2  C) (Oral)   Resp 20   SpO2 96%  Estimated body mass index is 29.18 kg/m  as calculated from the following:    Height as of 3/20/22: 1.626 m (5' 4\").    Weight as of 7/15/22: 77.1 kg (170 lb). There is no height or weight on file to calculate BSA.  Extreme Pain (8) Comment: Data Unavailable   No LMP recorded. Patient has had an injection.  Allergies reviewed: Yes  Medications reviewed: Yes    Medications: Medication refills not needed today.  Pharmacy name entered into EPIC: [facility] [place], [place] - [address] [place] [address] [phone]    Clinical concerns: VSS. Pt in a lot of pain.      Maria M Gonzalez RN                        "

## 2022-07-27 NOTE — PROGRESS NOTES
This is a recent snapshot of the patient's [facility] medical record.  For current drug dose and complete information and questions, call [phone] or In Basket pool, fv home infusion ([id number])  CSN Number:  [id number]

## 2022-07-28 ENCOUNTER — APPOINTMENT (OUTPATIENT)
Dept: LAB | Facility: CLINIC | Age: 22
End: 2022-07-28
Attending: REGISTERED NURSE
Payer: COMMERCIAL

## 2022-07-28 ENCOUNTER — ONCOLOGY VISIT (OUTPATIENT)
Dept: ONCOLOGY | Facility: CLINIC | Age: 22
End: 2022-07-28
Attending: REGISTERED NURSE
Payer: COMMERCIAL

## 2022-07-28 VITALS
BODY MASS INDEX: 28.49 KG/M2 | TEMPERATURE: 98.7 F | HEART RATE: 112 BPM | SYSTOLIC BLOOD PRESSURE: 125 MMHG | WEIGHT: 166 LBS | OXYGEN SATURATION: 99 % | DIASTOLIC BLOOD PRESSURE: 83 MMHG | RESPIRATION RATE: 18 BRPM

## 2022-07-28 DIAGNOSIS — G81.10 SPASTIC HEMIPLEGIA, UNSPECIFIED ETIOLOGY, UNSPECIFIED LATERALITY (H): Primary | ICD-10-CM

## 2022-07-28 DIAGNOSIS — Z78.9 PROBLEM WITH VASCULAR ACCESS: ICD-10-CM

## 2022-07-28 DIAGNOSIS — J45.909 ASTHMATIC BRONCHITIS WITHOUT COMPLICATION, UNSPECIFIED ASTHMA SEVERITY, UNSPECIFIED WHETHER PERSISTENT: ICD-10-CM

## 2022-07-28 DIAGNOSIS — D57.00 SICKLE CELL PAIN CRISIS (H): ICD-10-CM

## 2022-07-28 DIAGNOSIS — H53.9 VISION CHANGES: ICD-10-CM

## 2022-07-28 DIAGNOSIS — D57.00 SICKLE CELL PAIN CRISIS (H): Primary | ICD-10-CM

## 2022-07-28 DIAGNOSIS — E83.111 IRON OVERLOAD DUE TO REPEATED RED BLOOD CELL TRANSFUSIONS: ICD-10-CM

## 2022-07-28 DIAGNOSIS — H90.3 SENSORINEURAL HEARING LOSS, BILATERAL: ICD-10-CM

## 2022-07-28 LAB
ALBUMIN SERPL-MCNC: 4 G/DL (ref 3.4–5)
ALP SERPL-CCNC: 81 U/L (ref 40–150)
ALT SERPL W P-5'-P-CCNC: 57 U/L (ref 0–50)
ANION GAP SERPL CALCULATED.3IONS-SCNC: 9 MMOL/L (ref 3–14)
AST SERPL W P-5'-P-CCNC: 67 U/L (ref 0–45)
BASOPHILS # BLD AUTO: 0.2 10E3/UL (ref 0–0.2)
BASOPHILS NFR BLD AUTO: 2 %
BILIRUB SERPL-MCNC: 4.1 MG/DL (ref 0.2–1.3)
BUN SERPL-MCNC: 7 MG/DL (ref 7–30)
CALCIUM SERPL-MCNC: 8.7 MG/DL (ref 8.5–10.1)
CHLORIDE BLD-SCNC: 112 MMOL/L (ref 94–109)
CO2 SERPL-SCNC: 20 MMOL/L (ref 20–32)
CREAT SERPL-MCNC: 0.52 MG/DL (ref 0.52–1.04)
EOSINOPHIL # BLD AUTO: 0.5 10E3/UL (ref 0–0.7)
EOSINOPHIL NFR BLD AUTO: 4 %
ERYTHROCYTE [DISTWIDTH] IN BLOOD BY AUTOMATED COUNT: 26.4 % (ref 10–15)
GFR SERPL CREATININE-BSD FRML MDRD: >90 ML/MIN/1.73M2
GLUCOSE BLD-MCNC: 95 MG/DL (ref 70–99)
HCT VFR BLD AUTO: 20.2 % (ref 35–47)
HGB BLD-MCNC: 7.3 G/DL (ref 11.7–15.7)
IMM GRANULOCYTES # BLD: 0.1 10E3/UL
IMM GRANULOCYTES NFR BLD: 1 %
LYMPHOCYTES # BLD AUTO: 2.1 10E3/UL (ref 0.8–5.3)
LYMPHOCYTES NFR BLD AUTO: 17 %
MCH RBC QN AUTO: 30 PG (ref 26.5–33)
MCHC RBC AUTO-ENTMCNC: 36.1 G/DL (ref 31.5–36.5)
MCV RBC AUTO: 83 FL (ref 78–100)
MONOCYTES # BLD AUTO: 0.7 10E3/UL (ref 0–1.3)
MONOCYTES NFR BLD AUTO: 6 %
NEUTROPHILS # BLD AUTO: 8.5 10E3/UL (ref 1.6–8.3)
NEUTROPHILS NFR BLD AUTO: 70 %
NRBC # BLD AUTO: 0.4 10E3/UL
NRBC BLD AUTO-RTO: 3 /100
PLATELET # BLD AUTO: 347 10E3/UL (ref 150–450)
POTASSIUM BLD-SCNC: 3.7 MMOL/L (ref 3.4–5.3)
PROT SERPL-MCNC: 7.9 G/DL (ref 6.8–8.8)
RBC # BLD AUTO: 2.43 10E6/UL (ref 3.8–5.2)
SODIUM SERPL-SCNC: 141 MMOL/L (ref 133–144)
WBC # BLD AUTO: 12.1 10E3/UL (ref 4–11)

## 2022-07-28 PROCEDURE — 96361 HYDRATE IV INFUSION ADD-ON: CPT

## 2022-07-28 PROCEDURE — 96374 THER/PROPH/DIAG INJ IV PUSH: CPT

## 2022-07-28 PROCEDURE — 85025 COMPLETE CBC W/AUTO DIFF WBC: CPT | Performed by: PEDIATRICS

## 2022-07-28 PROCEDURE — 36591 DRAW BLOOD OFF VENOUS DEVICE: CPT | Performed by: PEDIATRICS

## 2022-07-28 PROCEDURE — 250N000011 HC RX IP 250 OP 636: Performed by: PEDIATRICS

## 2022-07-28 PROCEDURE — 250N000011 HC RX IP 250 OP 636: Performed by: REGISTERED NURSE

## 2022-07-28 PROCEDURE — G0463 HOSPITAL OUTPT CLINIC VISIT: HCPCS

## 2022-07-28 PROCEDURE — 99215 OFFICE O/P EST HI 40 MIN: CPT | Performed by: REGISTERED NURSE

## 2022-07-28 PROCEDURE — 80053 COMPREHEN METABOLIC PANEL: CPT | Performed by: PEDIATRICS

## 2022-07-28 PROCEDURE — 258N000003 HC RX IP 258 OP 636: Performed by: PEDIATRICS

## 2022-07-28 PROCEDURE — 96376 TX/PRO/DX INJ SAME DRUG ADON: CPT

## 2022-07-28 RX ORDER — HEPARIN SODIUM (PORCINE) LOCK FLUSH IV SOLN 100 UNIT/ML 100 UNIT/ML
5 SOLUTION INTRAVENOUS
Status: DISCONTINUED | OUTPATIENT
Start: 2022-07-28 | End: 2022-07-28 | Stop reason: HOSPADM

## 2022-07-28 RX ORDER — HEPARIN SODIUM (PORCINE) LOCK FLUSH IV SOLN 100 UNIT/ML 100 UNIT/ML
5 SOLUTION INTRAVENOUS
Status: CANCELLED | OUTPATIENT
Start: 2022-10-01

## 2022-07-28 RX ORDER — OXYCODONE HYDROCHLORIDE 15 MG/1
15 TABLET ORAL EVERY 4 HOURS PRN
Qty: 45 TABLET | Refills: 0 | Status: SHIPPED | OUTPATIENT
Start: 2022-07-28 | End: 2022-08-04

## 2022-07-28 RX ORDER — MORPHINE SULFATE 2 MG/ML
2 INJECTION, SOLUTION INTRAMUSCULAR; INTRAVENOUS
Status: DISCONTINUED | OUTPATIENT
Start: 2022-07-28 | End: 2022-07-28 | Stop reason: HOSPADM

## 2022-07-28 RX ORDER — HEPARIN SODIUM,PORCINE 10 UNIT/ML
5 VIAL (ML) INTRAVENOUS
Status: CANCELLED | OUTPATIENT
Start: 2022-10-01

## 2022-07-28 RX ORDER — MORPHINE SULFATE 2 MG/ML
2 INJECTION, SOLUTION INTRAMUSCULAR; INTRAVENOUS
Status: CANCELLED
Start: 2022-10-01

## 2022-07-28 RX ORDER — HEPARIN SODIUM (PORCINE) LOCK FLUSH IV SOLN 100 UNIT/ML 100 UNIT/ML
5 SOLUTION INTRAVENOUS ONCE
Status: COMPLETED | OUTPATIENT
Start: 2022-07-28 | End: 2022-07-28

## 2022-07-28 RX ORDER — DIPHENHYDRAMINE HCL 25 MG
25 CAPSULE ORAL
Status: CANCELLED
Start: 2022-10-01

## 2022-07-28 RX ORDER — ONDANSETRON 8 MG/1
8 TABLET, FILM COATED ORAL
Status: CANCELLED
Start: 2022-10-01

## 2022-07-28 RX ADMIN — MORPHINE SULFATE 2 MG: 2 INJECTION, SOLUTION INTRAMUSCULAR; INTRAVENOUS at 12:14

## 2022-07-28 RX ADMIN — Medication 5 ML: at 12:26

## 2022-07-28 RX ADMIN — DEXTROSE AND SODIUM CHLORIDE 1000 ML: 5; 450 INJECTION, SOLUTION INTRAVENOUS at 10:12

## 2022-07-28 RX ADMIN — MORPHINE SULFATE 2 MG: 2 INJECTION, SOLUTION INTRAMUSCULAR; INTRAVENOUS at 11:14

## 2022-07-28 RX ADMIN — MORPHINE SULFATE 2 MG: 2 INJECTION, SOLUTION INTRAMUSCULAR; INTRAVENOUS at 10:13

## 2022-07-28 RX ADMIN — SODIUM CHLORIDE, PRESERVATIVE FREE 5 ML: 5 INJECTION INTRAVENOUS at 08:22

## 2022-07-28 ASSESSMENT — PAIN SCALES - GENERAL: PAINLEVEL: EXTREME PAIN (8)

## 2022-07-28 NOTE — PROGRESS NOTES
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 She had multiple thromboembolic events in 2021 despite adherent anticoagulation use (though warfarin was perpetually low) and there are concerns for chronic thromboembolic disease but did not have pulmonary HTN on a November 2021 cath. She is maintained on chronic PO opioids and twice-weekly infusion visits (since 1/24/22) but has been able to be maintained on this regimen and has stayed out of the ED most of the time with even rarer admissions.   on.    Interval History:  Jennifer presents to clinic today for routine hematology follow-up. She is having a pain flare today but overall feels pain is well controlled. When she last saw Dr. Duncan about 2 weeks ago she discussed the possibility of decreasing her infusion appointments to weekly although her frequency remains capped at 2 infusions. She was encouraged to continue to decrease infusion visits if possible. She feels that the oxycodone is the most effective measure for pain treatment. Currently she receives 45 tablets per week and takes the maximum 6 tablets per day. She verbalized a desire to be completely off of oxycodone by her next birthday in March.    She has resume Jadenu and is taking this routinely along with her aspirin. She notes persistent pain/pressure around her port site that has been present for a few months. It has also been increasingly positional with access attempts. I previously requested a port dye study although  "this has not been completed. Following her ophthalmology visit she was prescribed new glasses which should be ready to  in the next week.    She is staying busy at home and running errands with her mom frequently throughout the week. She routinely discusses the positive relationship she has with her 5-year-old nephew which we again discussed today. She feels he distracts her from her pain in a positive way and gives her a reason to be active both physically and mentally.         Sickle Cell Disease Comprehensive Checklist    Bone Health/Avascular Necrosis Screening/Symptoms (each visit): no new concerns today    Leg Ulcer evaluation (every visit): none    Hypertension (every visit):stable, high normal    Last pulmonary evaluation (asthma, AMAN, pulm HTN): within last year( due again)    Stroke/silent cerebral infarct Hx (Y/N): Yes TIA ~2014, first event ~age 1 with full stroke and R sided weakness    Last PCP Visit: 8/2020    Vaccines:  ? PCV13: 5/13/19  ? Pneumovax (PPSV23): 3/04, 10/09, 7/12/19 (next due 7/2024)  ? Menactra: 4/2010, 9/2015 (MCV done 8/16/21)  ? Influenza: got 20-21 vaccine per self report    Audiology (chelation): done 6/2020, normal.. However, on 6/7, \"While there is no significant change in grade on the CTCAE4.03 Scale, there were hearing changes bilaterally for both standard and extended high frequency audiometry.  Will need to determine if an ENT consult is advised due to the asymmetry in extended high frequency testing.\"     Plan last reviewed with patient: 7/11/22    Patient background: 23 yo F, enjoys movies and kids though there are times where she does not really want to talk to people. Does not have a lot of social support at home.     Sickle Cell Disease History  Primary Hematologist Team: Jose Rafael Duncan  PCP: none  Genotype: SS  Acute Pain Crisis Treatment: (avoiding IV opioids for now, which she has agreed to)    ER   o Oxycodone 15-20 mg x1  o Ketamine 4mg IV x 2--helps with " opioid sparing  o Toradol 15 mg IV x1   o Maintenance IV fluids with LR  o Other: Zofran 8 mg IV PRN nausea    Inpatient:  o Home oxycodone/Oxycontin regimen, though home oxycodone dosing could be increased to 20 mg to start  o PCA plan:   - None for now  o Other Medications: Zofran  o She has had success with ketamine starting at 4mg/h and advancing only to 6mg/h, as 8mg/h made her feel quite poorly..  o ASA  o Supportive Care: Docusate, Senna  Chronic Pain Medications:    Home regimen Oxycontin 10 mg q12h, oxycodone 15 mg p.o. q.4-6 hours p.r.n. breakthrough pain.  She also continues on Voltaren gel, and Zoloft among other medications.    -Also benefits from mental health visits, acupuncture  Baseline Hemoglobin: 7 g/dl without chronic transfusions  Hydroxyurea use: Yes  H/O blood transfusions: Yes, several (iron overload) Most recent 11/20/2021    H/O Transfusion Reactions: no    Antibodies:none  H/O Acute Chest Syndrome: Yes    Last episode: 10/2019     ICU/intubation: unsure  H/O Stroke: Yes (managed with chronic transfusions in the past, stopped late Spring 2020)  H/O VTE: Yes (2/2021)  H/O Cholecystectomy or Splenectomy: no  H/O Asthma, Pulm HTN, AVN, Leg Ulcers, Nephropathy, Retinopathy, etc: Iron overload, asthma, chronic lung disease, physical limitations from early stroke    ---------------------------------------  Jennifer Cervantes's Goals (discussed 7/28/22)    1-3 month goal:      6 month goal:  Work on finding a job , Work on driving    12 month goal:      Disease-specific goal(s):  Continue to stay out of the hospital, wean off of oxycodone by next birthday (March 2023)  ---------------------------------------      Current Outpatient Medications   Medication Sig Dispense Refill     oxyCODONE IR (ROXICODONE) 15 MG tablet Take 1 tablet (15 mg) by mouth every 4 hours as needed for severe pain Goal 4 per day. Max 6 per day. 45 tablet 0     acetaminophen (TYLENOL) 325 MG tablet Take 2 tablets (650 mg) by  mouth every 6 hours as needed for mild pain 120 tablet 3     albuterol (PROVENTIL) (2.5 MG/3ML) 0.083% neb solution Take 1 vial (2.5 mg) by nebulization every 6 hours as needed for shortness of breath / dyspnea or wheezing 12 mL 4     albuterol (PROVENTIL) (2.5 MG/3ML) 0.083% neb solution Take 2 vials (5 mg) by nebulization every 6 hours as needed for shortness of breath / dyspnea or wheezing 90 mL 3     aspirin (ASA) 81 MG chewable tablet Take 1 tablet (81 mg) by mouth 2 times daily 60 tablet 11     budesonide-formoterol (SYMBICORT) 160-4.5 MCG/ACT Inhaler Inhale 2 puffs into the lungs 2 times daily 10.2 g 3     deferasirox (JADENU) 360 MG tablet Take 4 tablets (1,440 mg) by mouth every evening 120 tablet 4     diphenhydrAMINE (BENADRYL) 25 MG capsule Take 1-2 capsules (25-50 mg) by mouth nightly as needed for sleep 60 capsule 3     EPINEPHrine (ANY BX GENERIC EQUIV) 0.3 MG/0.3ML injection 2-pack Inject 0.3 mLs (0.3 mg) into the muscle as needed for anaphylaxis 1 each 1     Hydroxyurea 1000 MG TABS Take 3,000 mg by mouth daily 90 tablet 3     ondansetron (ZOFRAN) 8 MG tablet Take 1 tablet (8 mg) by mouth every 8 hours as needed 30 tablet 1       Past Medical History  Past Medical History:   Diagnosis Date     Anxiety      Bleeding disorder (H)      Blood clotting disorder (H)      Cerebral infarction (H) 2015     Cognitive developmental delay     low IQ. Please recognize when managing pain and planning with her     Depressive disorder      Hemiplegia and hemiparesis following cerebral infarction affecting right dominant side (H)     right hand contractures     Iron overload due to repeated red blood cell transfusions      Migraines      Multiple subsegmental pulmonary emboli without acute cor pulmonale (H) 02/01/2021     Oppositional defiant behavior      Superficial venous thrombosis of arm, right 03/25/2021     Uncomplicated asthma      Past Surgical History:   Procedure Laterality Date     AS INSERT TUNNELED CV  2 CATH W/O PORT/PUMP       CHOLECYSTECTOMY       CV RIGHT HEART CATH MEASUREMENTS RECORDED N/A 11/18/2021    Procedure: Right Heart Cath;  Surgeon: Jackson Stauffer MD;  Location:  HEART CARDIAC CATH LAB     INSERT PORT VASCULAR ACCESS Left 4/21/2021    Procedure: INSERTION, VASCULAR ACCESS PORT (NOT SURE ON SIDE UNTIL REMOVAL);  Surgeon: Rajan More MD;  Location: [facility] OR     IR CHEST PORT PLACEMENT > 5 YRS OF AGE  4/21/2021     IR CVC NON TUNNEL LINE REMOVAL  5/6/2021     IR CVC NON TUNNEL PLACEMENT  04/07/2020     IR CVC NON TUNNEL PLACEMENT  4/30/2021     IR PORT REMOVAL LEFT  4/21/2021     REMOVE PORT VASCULAR ACCESS Left 4/21/2021    Procedure: REMOVAL, VASCULAR ACCESS PORT LEFT;  Surgeon: Rajan More MD;  Location: [facility] OR     REPAIR TENDON ELBOW Right 10/02/2019    Procedure: Right Elbow Flexor Lengthening, Flexor Pronator Slide Of Wrist and Finger, Thumb Adductor Lengthening;  Surgeon: Anai Franco MD;  Location: [facility]     TONSILLECTOMY Bilateral 10/02/2019    Procedure: Bilateral Tonsillectomy;  Surgeon: Farhana Guy MD;  Location: [facility] OR     ZZC BREAST REDUCTION (INCLUDES LIPO) TIER 3 Bilateral 04/23/2019     Allergies   Allergen Reactions     Contrast Dye      Hives and breathing issues     Fish-Derived Products Hives     Seafood Hives     Diagnostic X-Ray Materials      Gadolinium      Social History   Social History     Tobacco Use     Smoking status: Never Smoker     Smokeless tobacco: Never Used   Substance Use Topics     Alcohol use: Not Currently     Alcohol/week: 0.0 standard drinks     Drug use: Never    Still living at home with mom and extended family.     Past medical history and social history were reviewed.    Physical Examination:  /83   Pulse 112   Temp 98.7  F (37.1  C)   Resp 18   Wt 75.3 kg (166 lb)   SpO2 99%   BMI 28.49 kg/m    Wt Readings from Last 10 Encounters:   07/28/22 75.3 kg (166 lb)   07/15/22 77.1 kg (170 lb)   07/11/22 76.9 kg  (169 lb 8 oz)   06/26/22 76.7 kg (169 lb)   06/20/22 76.8 kg (169 lb 6.4 oz)   06/06/22 76.7 kg (169 lb)   05/27/22 77.2 kg (170 lb 3.2 oz)   05/17/22 77.9 kg (171 lb 11.8 oz)   04/15/22 77 kg (169 lb 12.8 oz)   03/30/22 77.1 kg (170 lb)     General: Pleasant female, NAD  Eyes: EOMI, PERRL. No scleral icterus.  Skin: no bruising noted on exposed skin. Port site c/d/i, not yet accessed  Respiratory:  Normal respiratory effort. Lungs clear to auscultation.  Cardiac: RRR, normal rhythm. No murmur.  Neurologic: Cranial nerves II through XII are grossly intact. Contractured right hand 2/2 stroke history, mild antalgic gait    Laboratory Data:   Latest Reference Range & Units 07/28/22 08:19   Sodium 133 - 144 mmol/L 141   Potassium 3.4 - 5.3 mmol/L 3.7   Chloride 94 - 109 mmol/L 112 (H)   Carbon Dioxide 20 - 32 mmol/L 20   Urea Nitrogen 7 - 30 mg/dL 7   Creatinine 0.52 - 1.04 mg/dL 0.52   GFR Estimate >60 mL/min/1.73m2 >90   Calcium 8.5 - 10.1 mg/dL 8.7   Anion Gap 3 - 14 mmol/L 9   Albumin 3.4 - 5.0 g/dL 4.0   Protein Total 6.8 - 8.8 g/dL 7.9   Alkaline Phosphatase 40 - 150 U/L 81   ALT 0 - 50 U/L 57 (H)   AST 0 - 45 U/L 67 (H)   Bilirubin Total 0.2 - 1.3 mg/dL 4.1 (H)   (H): Data is abnormally high    Most recent labs reviewed and interpreted by me today.    Assessment and Plan:  1. Sickle Cell HgbSS Disease  2. Chronic Pain  3. Iron overload  4. 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 History of CVA  6. Hearing loss    Overall Jennifer continues to manage pain well at home with oxycodone and supportive measures such as hydration and warm baths. She desires to cautiously wean off of her oxycodone with a goal of stopping this completely by next March. We are aiming to begin this taper around September or October. We discussed various approaches to this, the first which could be making zero adjustments in her dosage or quantity filled but instead trying to self-reduce her daily tablet use  to 5 tabs/day. In the meantime she will continue to focus on reducing her infusion appointments.      She remains off of warfarin and is taking aspiring consistently twice a day. She has also resume Jadenu following ophthalmology exam and remains compliant. Her eyeglass prescription was changed and she is awaiting new glasses. Once she receives this she plans to focus on one of her personal goals which is working on driving. She is awaiting ENT scheduling for hearing loss. In the meantime we will continue to hold Desferal. There was concern that she was not taking HU regularly based on her recent HbS/HbF % and MCV although she reports continued compliance.     With her persistent port discomfort and ongoing issues with difficult attempts I will again request a port dye study. I am less concerned about a clot based on her description of the discomfort and the length of time that this has been an issue. Ideally she'd like a new port but I encouraged further investigation first.    Plan:  -Continue Hydrea to 3000mg daily to help lessen frequency of sickle cell pain  -Port dye study for increased pain/progressive difficulty with accessing  -Pulmonary referral-overdue for annual evaluation  -Continue Oxycodone at home. She is still interested in self-reducing infusion days/week but will keep the cap at 2/week for now  -Continue diligent home management with current medications, heat, rest, compression, warm baths.   -If unable to manage at home can go to ED but continue to not do IV narcotics in the emergency room. Ketamine previously added to pain plan in ED  -Continue deferasirox (Jadenu), holding deferoxamine for now (Desferal)  -Continue aspirin BID.   -RTC in 1 month as scheduled with Dr. Duncan    50 minutes spent on the date of the encounter doing chart review, review of test results, interpretation of tests, patient visit and documentation     Patricia Mantilla CNP  [facility]  [address] [place] [address]  [place] [address]  [phone]

## 2022-07-28 NOTE — NURSING NOTE
Chief Complaint   Patient presents with     Port Draw     Labs drawn by RN via port, vitals taken.     Port accessed with 20 gauge 3/4 inch gripper needle by RN, labs collected, line flushed with saline and heparin.  Vitals taken. Pt checked in for appointment(s).    Carly Ellis RN

## 2022-07-28 NOTE — NURSING NOTE
"Oncology Rooming Note    July 28, 2022 8:45 AM   Jennifer Cervantes is a 22 year old female who presents for:    Chief Complaint   Patient presents with     Port Draw     Labs drawn by RN via port, vitals taken.     Oncology Clinic Visit     Rtn for sickle cell pain crisis     Initial Vitals: /83   Pulse 112   Temp 98.7  F (37.1  C)   Resp 18   Wt 75.3 kg (166 lb)   SpO2 99%   BMI 28.49 kg/m   Estimated body mass index is 28.49 kg/m  as calculated from the following:    Height as of 3/20/22: 1.626 m (5' 4\").    Weight as of this encounter: 75.3 kg (166 lb). Body surface area is 1.84 meters squared.  Extreme Pain (8) Comment: Data Unavailable   No LMP recorded. Patient has had an injection.  Allergies reviewed: Yes  Medications reviewed: Yes    Medications: MEDICATION REFILLS NEEDED TODAY. Provider was notified.       Oxycodone  1st floor pharmacy    Pharmacy name entered into [facility]: [place], [place] - [address] [place] [address] [phone]    Clinical concerns: Patient has no specific questions or clinical concerns outside of the reason for the visit.         Yadi Welch, EMT            "

## 2022-07-28 NOTE — PATIENT INSTRUCTIONS
Contact Numbers  [facility]: [phone]    After Hours:  [phone]  Triage: [phone]    Please call the [facility] Triage line if you experience a temperature greater than or equal to 100.5, shaking chills, have uncontrolled nausea, vomiting and/or diarrhea, dizziness, shortness of breath, chest pain, bleeding, unexplained bruising, or if you have any other new/concerning symptoms, questions or concerns.     If it is after hours, weekends, or holidays, please call the main hospital  at  [phone] and ask to speak to the Oncology doctor on call.     If you are having any concerning symptoms or wish to speak to a provider before your next infusion visit, please call your care coordinator or triage to notify them so we can adequately serve you.     If you need a refill on a narcotic prescription or other medication, please call triage before your infusion appointment.       July 2022 Sunday Monday Tuesday Wednesday Thursday Friday Saturday                            1     2    Admission   8:21 AM   [facility]   (Discharge: 7/2/2022)   3     4     5    BMT INFUSION 2 HR (120 MIN)   1:00 PM   (120 min.)    BMT INFUSION NURSE   [facility] [place] 6    [facility] RETURN GENERAL   8:25 AM   (20 min.)   Usama Mcclendon OD   [facility] 7     8    BMT INFUSION 2 HR (120 MIN)  12:00 PM   (120 min.)    BMT INFUSION NURSE   [facility] [place] 9       10     11    LAB PERIPHERAL  12:00 PM   (15 min.)    [facility] DRAW   [facility]    RETURN  12:15 PM   (30 min.)   Eric Duncan MD   [facility]    ONC INFUSION 2 HR (120 MIN)   1:00 PM   (120 min.)    ONC INFUSION NURSE   [facility] 12     13     14     15    RETURN  10:45 AM   (60 min.)   Stan  Katherine Renae MD   [facility]    SPEC INFUSION 2 HR (120 MIN)  12:00 PM   (120 min.)   [facility] INF[facility]SE   [facility] [place] 16       17     18    BMT INFUSION 2 HR (120 MIN)   1:00 PM   (120 min.)    BMT INFUSION NURSE   [facility] 19     20     21     22    BMT INFUSION 2 HR (120 MIN)  12:30 PM   (120 min.)    BMT INFUSION NURSE   [facility] 23       24     25    BMT INFUSION 2 HR (120 MIN)   1:00 PM   (120 min.)    BMT INFUSION NURSE   [facility] 26     27     28    LAB PERIPHERAL   8:15 AM   (15 min.)   [facility] D[facility]   [facility]    RETURN   8:45 AM   (45 min.)   Patricia Mantilla CNP   [facility]    ONC INFUSION 2 HR (120 MIN)  10:00 AM   (120 min.)    ONC INFUSION NURSE   [facility] 29     30       31 August 2022 Sunday Monday Tuesday Wednesday Thursday Friday Saturday        1     2     3     4     5     6       7     8     9     10     11     12     13       14     15     16     17     18     19     20       21     22     23     24     25    [facility]   2:45 PM   (30 min.)   Suraj Case MD   [facility] [place] 26     27       28     29    LAB PERIPHERAL  11:30 AM   (15 min.)    [facility] DRAW   [facility]    RETURN  11:45 AM   (30 min.)   Eric Duncan MD   [facility]    ONC INFUSION 2 HR (120 MIN)   1:00 PM   (120 min.)    ONC INFUSION NURSE   [facility] 30     31                                      Lab Results:  Recent Results (from the past 12 hour(s))   Comprehensive  metabolic panel    Collection Time: 07/28/22  8:19 AM   Result Value Ref Range    Sodium 141 133 - 144 mmol/L    Potassium 3.7 3.4 - 5.3 mmol/L    Chloride 112 (H) 94 - 109 mmol/L    Carbon Dioxide (CO2) 20 20 - 32 mmol/L    Anion Gap 9 3 - 14 mmol/L    Urea Nitrogen 7 7 - 30 mg/dL    Creatinine 0.52 0.52 - 1.04 mg/dL    Calcium 8.7 8.5 - 10.1 mg/dL    Glucose 95 70 - 99 mg/dL    Alkaline Phosphatase 81 40 - 150 U/L    AST 67 (H) 0 - 45 U/L    ALT 57 (H) 0 - 50 U/L    Protein Total 7.9 6.8 - 8.8 g/dL    Albumin 4.0 3.4 - 5.0 g/dL    Bilirubin Total 4.1 (H) 0.2 - 1.3 mg/dL    GFR Estimate >90 >60 mL/min/1.73m2   CBC with platelets and differential    Collection Time: 07/28/22  8:19 AM   Result Value Ref Range    WBC Count 12.1 (H) 4.0 - 11.0 10e3/uL    RBC Count 2.43 (L) 3.80 - 5.20 10e6/uL    Hemoglobin 7.3 (L) 11.7 - 15.7 g/dL    Hematocrit 20.2 (L) 35.0 - 47.0 %    MCV 83 78 - 100 fL    MCH 30.0 26.5 - 33.0 pg    MCHC 36.1 31.5 - 36.5 g/dL    RDW 26.4 (H) 10.0 - 15.0 %    Platelet Count 347 150 - 450 10e3/uL    % Neutrophils 70 %    % Lymphocytes 17 %    % Monocytes 6 %    % Eosinophils 4 %    % Basophils 2 %    % Immature Granulocytes 1 %    NRBCs per 100 WBC 3 (H) <1 /100    Absolute Neutrophils 8.5 (H) 1.6 - 8.3 10e3/uL    Absolute Lymphocytes 2.1 0.8 - 5.3 10e3/uL    Absolute Monocytes 0.7 0.0 - 1.3 10e3/uL    Absolute Eosinophils 0.5 0.0 - 0.7 10e3/uL    Absolute Basophils 0.2 0.0 - 0.2 10e3/uL    Absolute Immature Granulocytes 0.1 <=0.4 10e3/uL    Absolute NRBCs 0.4 10e3/uL

## 2022-07-28 NOTE — PROGRESS NOTES
Infusion Nursing Note:  Jennifer Cervantes presents today for IVF/apin medication.    Patient seen by provider today: Yes: Patricia HIGGINS   present during visit today: Not Applicable.    Note: Patient arrived at infusion complaining non radiating lower back pain as characterized by constant sharp with PS 8/10. No new complaints made. Otherwise well.    Upon discharge patient pain score 4/10 with total of 3 doses of Morphine. Patient agrred to go home. Patient verbalized knowledge on where and when to call if symptoms persists/worsen.    Intravenous Access:  Implanted Port.    Treatment Conditions:  Lab Results   Component Value Date    HGB 7.3 (L) 07/28/2022    WBC 12.1 (H) 07/28/2022    ANEU 10.2 (H) 06/26/2022    ANEUTAUTO 8.5 (H) 07/28/2022     07/28/2022      Lab Results   Component Value Date     07/28/2022    POTASSIUM 3.7 07/28/2022    MAG 2.0 11/11/2021    CR 0.52 07/28/2022    MICAH 8.7 07/28/2022    BILITOTAL 4.1 (H) 07/28/2022    ALBUMIN 4.0 07/28/2022    ALT 57 (H) 07/28/2022    AST 67 (H) 07/28/2022     Results reviewed, labs MET treatment parameters, ok to proceed with treatment.    Post Infusion Assessment:  Patient tolerated infusion without incident.  Blood return noted pre and post infusion.  Site patent and intact, free from redness, edema or discomfort.  No evidence of extravasations.  Access discontinued per protocol.     Discharge Plan:   Patient prescription refills. Oxycodone  Discharge instructions reviewed with: Patient.  Patient and/or family verbalized understanding of discharge instructions and all questions answered.  AVS to patient via [employer].  Patient will return 8/29/22 for next appointment.   Patient discharged in stable condition accompanied by: self.  Departure Mode: Ambulatory.      GLORIA YANCEY RN

## 2022-07-29 NOTE — PROGRESS NOTES
This is a recent snapshot of the patient's [facility] medical record.  For current drug dose and complete information and questions, call [phone] or In Basket pool, [facility] home infusion ([id number])  CSN Number:  [id number]

## 2022-08-01 ENCOUNTER — INFUSION THERAPY VISIT (OUTPATIENT)
Dept: ONCOLOGY | Facility: CLINIC | Age: 22
End: 2022-08-01
Attending: PEDIATRICS
Payer: COMMERCIAL

## 2022-08-01 ENCOUNTER — PATIENT OUTREACH (OUTPATIENT)
Dept: ONCOLOGY | Facility: CLINIC | Age: 22
End: 2022-08-01

## 2022-08-01 VITALS
RESPIRATION RATE: 18 BRPM | SYSTOLIC BLOOD PRESSURE: 137 MMHG | HEART RATE: 92 BPM | TEMPERATURE: 98.1 F | DIASTOLIC BLOOD PRESSURE: 90 MMHG | OXYGEN SATURATION: 93 %

## 2022-08-01 DIAGNOSIS — D57.00 SICKLE CELL PAIN CRISIS (H): ICD-10-CM

## 2022-08-01 DIAGNOSIS — G81.10 SPASTIC HEMIPLEGIA, UNSPECIFIED ETIOLOGY, UNSPECIFIED LATERALITY (H): Primary | ICD-10-CM

## 2022-08-01 PROCEDURE — 258N000003 HC RX IP 258 OP 636: Performed by: PEDIATRICS

## 2022-08-01 PROCEDURE — 96376 TX/PRO/DX INJ SAME DRUG ADON: CPT

## 2022-08-01 PROCEDURE — 96361 HYDRATE IV INFUSION ADD-ON: CPT

## 2022-08-01 PROCEDURE — 96374 THER/PROPH/DIAG INJ IV PUSH: CPT

## 2022-08-01 PROCEDURE — 250N000011 HC RX IP 250 OP 636: Performed by: PEDIATRICS

## 2022-08-01 RX ORDER — HEPARIN SODIUM (PORCINE) LOCK FLUSH IV SOLN 100 UNIT/ML 100 UNIT/ML
5 SOLUTION INTRAVENOUS
Status: DISCONTINUED | OUTPATIENT
Start: 2022-08-01 | End: 2022-08-01 | Stop reason: HOSPADM

## 2022-08-01 RX ORDER — DIPHENHYDRAMINE HCL 25 MG
25 CAPSULE ORAL
Status: CANCELLED
Start: 2022-10-01

## 2022-08-01 RX ORDER — MORPHINE SULFATE 2 MG/ML
2 INJECTION, SOLUTION INTRAMUSCULAR; INTRAVENOUS
Status: CANCELLED
Start: 2022-10-01

## 2022-08-01 RX ORDER — HEPARIN SODIUM,PORCINE 10 UNIT/ML
5 VIAL (ML) INTRAVENOUS
Status: CANCELLED | OUTPATIENT
Start: 2022-10-01

## 2022-08-01 RX ORDER — MORPHINE SULFATE 2 MG/ML
2 INJECTION, SOLUTION INTRAMUSCULAR; INTRAVENOUS
Status: COMPLETED | OUTPATIENT
Start: 2022-08-01 | End: 2022-08-01

## 2022-08-01 RX ORDER — ONDANSETRON 8 MG/1
8 TABLET, FILM COATED ORAL
Status: CANCELLED
Start: 2022-10-01

## 2022-08-01 RX ORDER — HEPARIN SODIUM (PORCINE) LOCK FLUSH IV SOLN 100 UNIT/ML 100 UNIT/ML
5 SOLUTION INTRAVENOUS
Status: CANCELLED | OUTPATIENT
Start: 2022-10-01

## 2022-08-01 RX ADMIN — MORPHINE SULFATE 2 MG: 2 INJECTION, SOLUTION INTRAMUSCULAR; INTRAVENOUS at 12:01

## 2022-08-01 RX ADMIN — Medication 5 ML: at 12:20

## 2022-08-01 RX ADMIN — MORPHINE SULFATE 2 MG: 2 INJECTION, SOLUTION INTRAMUSCULAR; INTRAVENOUS at 10:56

## 2022-08-01 RX ADMIN — DEXTROSE AND SODIUM CHLORIDE 1000 ML: 5; 450 INJECTION, SOLUTION INTRAVENOUS at 09:59

## 2022-08-01 RX ADMIN — MORPHINE SULFATE 2 MG: 2 INJECTION, SOLUTION INTRAMUSCULAR; INTRAVENOUS at 09:57

## 2022-08-01 NOTE — PATIENT INSTRUCTIONS
Contact Numbers  [facility]: [phone] (for symptom and scheduling needs)    Please call the [facility] Triage line if you experience a temperature greater than or equal to 100.4, shaking chills, have uncontrolled nausea, vomiting and/or diarrhea, dizziness, shortness of breath, chest pain, bleeding, unexplained bruising, or if you have any other new/concerning symptoms, questions or concerns.     If you are having any concerning symptoms or wish to speak to a provider before your next infusion visit, please call your care coordinator or triage to notify them so we can adequately serve you.     If you need a refill on a narcotic prescription or other medication, please call triage before your infusion appointment.         Lab Results:   Latest Reference Range & Units 07/28/22 08:19   Sodium 133 - 144 mmol/L 141   Potassium 3.4 - 5.3 mmol/L 3.7   Chloride 94 - 109 mmol/L 112 (H)   Carbon Dioxide 20 - 32 mmol/L 20   Urea Nitrogen 7 - 30 mg/dL 7   Creatinine 0.52 - 1.04 mg/dL 0.52   GFR Estimate >60 mL/min/1.73m2 >90   Calcium 8.5 - 10.1 mg/dL 8.7   Anion Gap 3 - 14 mmol/L 9   Albumin 3.4 - 5.0 g/dL 4.0   Protein Total 6.8 - 8.8 g/dL 7.9   Alkaline Phosphatase 40 - 150 U/L 81   ALT 0 - 50 U/L 57 (H)   AST 0 - 45 U/L 67 (H)   Bilirubin Total 0.2 - 1.3 mg/dL 4.1 (H)   Glucose 70 - 99 mg/dL 95   WBC 4.0 - 11.0 10e3/uL 12.1 (H)   Hemoglobin 11.7 - 15.7 g/dL 7.3 (L)   Hematocrit 35.0 - 47.0 % 20.2 (L)   Platelet Count 150 - 450 10e3/uL 347   RBC Count 3.80 - 5.20 10e6/uL 2.43 (L)   MCV 78 - 100 fL 83   MCH 26.5 - 33.0 pg 30.0   MCHC 31.5 - 36.5 g/dL 36.1   RDW 10.0 - 15.0 % 26.4 (H)   % Neutrophils % 70   % Lymphocytes % 17   % Monocytes % 6   % Eosinophils % 4   % Basophils % 2   Absolute Basophils 0.0 - 0.2 10e3/uL 0.2   Absolute Eosinophils 0.0 - 0.7 10e3/uL 0.5   Absolute Immature Granulocytes <=0.4 10e3/uL 0.1   Absolute Lymphocytes 0.8 - 5.3 10e3/uL 2.1   Absolute Monocytes 0.0 - 1.3 10e3/uL 0.7   % Immature  Granulocytes % 1   Absolute Neutrophils 1.6 - 8.3 10e3/uL 8.5 (H)   Absolute NRBCs 10e3/uL 0.4   NRBCs per 100 WBC <1 /100 3 (H)   (H): Data is abnormally high  (L): Data is abnormally low

## 2022-08-01 NOTE — PROGRESS NOTES
Infusion Nursing Note:  Jennifer Cervantes presents today for IVF/pain meds.    Patient seen by provider today: No   present during visit today: Not Applicable.    Note: Jennifer presents to infusion today with 9/10 pain in her lower back consistent with her normal sickle cell pain. She states she hopes to get her pain to 5 or less before going home. She denies any fevers, chills, chest pain, dizziness, shortness of breath, cough, or other symptoms. No questions or concerns.    At time of discharge, she rates her pain 5/10 after 3 doses of morphine. Comfortable going home.     Intravenous Access:  Implanted Port.    Treatment Conditions:  Not Applicable.    Post Infusion Assessment:  Patient tolerated infusion without incident.  Blood return noted pre and post infusion.  Site patent and intact, free from redness, edema or discomfort.  No evidence of extravasations.  Access discontinued per protocol.     Discharge Plan:   Patient declined prescription refills.  Discharge instructions reviewed with: Patient.  Patient and/or family verbalized understanding of discharge instructions and all questions answered.  AVS to patient via [employer].  Patient will return 8/29 for next appointment.   Patient discharged in stable condition accompanied by: self.  Departure Mode: Ambulatory.      Ca Greene RN

## 2022-08-03 RX ORDER — HEPARIN SODIUM (PORCINE) LOCK FLUSH IV SOLN 100 UNIT/ML 100 UNIT/ML
5 SOLUTION INTRAVENOUS
Status: CANCELLED | OUTPATIENT
Start: 2022-08-03

## 2022-08-03 RX ORDER — HEPARIN SODIUM,PORCINE 10 UNIT/ML
5 VIAL (ML) INTRAVENOUS
Status: CANCELLED | OUTPATIENT
Start: 2022-08-03

## 2022-08-04 ENCOUNTER — INFUSION THERAPY VISIT (OUTPATIENT)
Dept: TRANSPLANT | Facility: CLINIC | Age: 22
End: 2022-08-04
Attending: PEDIATRICS
Payer: COMMERCIAL

## 2022-08-04 ENCOUNTER — PATIENT OUTREACH (OUTPATIENT)
Dept: ONCOLOGY | Facility: CLINIC | Age: 22
End: 2022-08-04

## 2022-08-04 ENCOUNTER — PATIENT OUTREACH (OUTPATIENT)
Dept: CARE COORDINATION | Facility: CLINIC | Age: 22
End: 2022-08-04

## 2022-08-04 VITALS
RESPIRATION RATE: 16 BRPM | TEMPERATURE: 98.5 F | DIASTOLIC BLOOD PRESSURE: 84 MMHG | OXYGEN SATURATION: 93 % | HEART RATE: 106 BPM | SYSTOLIC BLOOD PRESSURE: 124 MMHG

## 2022-08-04 DIAGNOSIS — D57.00 SICKLE CELL PAIN CRISIS (H): ICD-10-CM

## 2022-08-04 DIAGNOSIS — G81.10 SPASTIC HEMIPLEGIA, UNSPECIFIED ETIOLOGY, UNSPECIFIED LATERALITY (H): Primary | ICD-10-CM

## 2022-08-04 PROCEDURE — 96374 THER/PROPH/DIAG INJ IV PUSH: CPT

## 2022-08-04 PROCEDURE — 96376 TX/PRO/DX INJ SAME DRUG ADON: CPT

## 2022-08-04 PROCEDURE — 258N000003 HC RX IP 258 OP 636: Performed by: PEDIATRICS

## 2022-08-04 PROCEDURE — 96361 HYDRATE IV INFUSION ADD-ON: CPT

## 2022-08-04 PROCEDURE — 250N000011 HC RX IP 250 OP 636: Performed by: PEDIATRICS

## 2022-08-04 RX ORDER — DIPHENHYDRAMINE HCL 25 MG
25 CAPSULE ORAL
Status: CANCELLED
Start: 2022-10-01

## 2022-08-04 RX ORDER — HEPARIN SODIUM,PORCINE 10 UNIT/ML
5 VIAL (ML) INTRAVENOUS
Status: CANCELLED | OUTPATIENT
Start: 2022-10-01

## 2022-08-04 RX ORDER — OXYCODONE HYDROCHLORIDE 15 MG/1
15 TABLET ORAL EVERY 4 HOURS PRN
Qty: 45 TABLET | Refills: 0 | Status: SHIPPED | OUTPATIENT
Start: 2022-08-04 | End: 2022-08-11

## 2022-08-04 RX ORDER — MORPHINE SULFATE 2 MG/ML
2 INJECTION, SOLUTION INTRAMUSCULAR; INTRAVENOUS
Status: CANCELLED
Start: 2022-10-01

## 2022-08-04 RX ORDER — ONDANSETRON 8 MG/1
8 TABLET, FILM COATED ORAL
Status: CANCELLED
Start: 2022-10-01

## 2022-08-04 RX ORDER — MORPHINE SULFATE 2 MG/ML
2 INJECTION, SOLUTION INTRAMUSCULAR; INTRAVENOUS
Status: COMPLETED | OUTPATIENT
Start: 2022-08-04 | End: 2022-08-04

## 2022-08-04 RX ORDER — OXYCODONE HYDROCHLORIDE 15 MG/1
15 TABLET ORAL EVERY 4 HOURS PRN
Qty: 45 TABLET | Refills: 0 | Status: CANCELLED | OUTPATIENT
Start: 2022-08-04

## 2022-08-04 RX ORDER — HEPARIN SODIUM (PORCINE) LOCK FLUSH IV SOLN 100 UNIT/ML 100 UNIT/ML
5 SOLUTION INTRAVENOUS
Status: CANCELLED | OUTPATIENT
Start: 2022-10-01

## 2022-08-04 RX ADMIN — MORPHINE SULFATE 2 MG: 2 INJECTION, SOLUTION INTRAMUSCULAR; INTRAVENOUS at 14:16

## 2022-08-04 RX ADMIN — MORPHINE SULFATE 2 MG: 2 INJECTION, SOLUTION INTRAMUSCULAR; INTRAVENOUS at 13:06

## 2022-08-04 RX ADMIN — DEXTROSE AND SODIUM CHLORIDE 1000 ML: 5; 450 INJECTION, SOLUTION INTRAVENOUS at 13:05

## 2022-08-04 RX ADMIN — MORPHINE SULFATE 2 MG: 2 INJECTION, SOLUTION INTRAMUSCULAR; INTRAVENOUS at 15:11

## 2022-08-04 ASSESSMENT — PAIN SCALES - GENERAL: PAINLEVEL: EXTREME PAIN (9)

## 2022-08-04 NOTE — PROGRESS NOTES
Pt called in to triage requesting IVF pain meds for lower back pain rated 8/10 x1 days. Stated last took prn Oxy 15mg at 6am this morning without relief, no longer taking long acting pain med. Denied any fevers, chills, cough, sob, chest pain or other symptoms. Pt's last infusion was 8/1, last clinic visit 7/28 with follow-up on 8/29 with Dr. Duncan. Pt will need SW assistance with rides. Pt qualifies per protocol set in therapy plan. Pt requesting weekly refill of Oxycodone to [facility], sent to Patricia Mantilla for approval.

## 2022-08-04 NOTE — LETTER
8/4/2022         RE: Jennifer Cervantes  [address]  [facility] [address]        Dear Colleague,    Thank you for referring your patient, Jennifer Cervantes, to the [facility] [place]. Please see a copy of my visit note below.    Infusion Nursing Note:  Jennifer Cervantes presents today for ivf and pain meds.    Patient seen by provider today: No   present during visit today: Not Applicable.    Note: Pt presents today with 9/10 pain to lower back. Vitals/meds/allergies reviewed and assessment completed. Pt tolerated bolus and total 6mg IV morphine without complication. Pain on discharge improved to less than 5/10.    Intravenous Access:  Implanted Port.    Treatment Conditions:  Not Applicable.    Post Infusion Assessment:  Patient tolerated infusion without incident.     Discharge Plan:   Patient discharged in stable condition accompanied by: self.  Departure Mode: Ambulatory.      Allison Bowman RN                          Again, thank you for allowing me to participate in the care of your patient.        Sincerely,        [facility]    
  Date:August 5, 2022      Provider requested that no letter be sent. Do not send.       [facility]      
Home

## 2022-08-04 NOTE — PROGRESS NOTES
Social Work Note: Telephone Call  Oncology Clinic     Data/Intervention:  Patient Name:  Jennifer Cervantes DOB/Age: 2000, 22 years old     Call From: JOE Patel        Reason for Call:  Transportation     Assessment:   called [employer] to arrange ride through patient's insurance. [employer] arranged  for patient from home with [employer] ([phone]).  Patient will need to call when ready for return ride home ([phone]).      Plan:  Patient is aware of the transportation plan.  available to assist with any other needs.      CARLOS Chavez,LGUDAY  Hematology/Oncology Social Worker  Phone:[phone] Pager: [phone]

## 2022-08-04 NOTE — PROGRESS NOTES
Infusion Nursing Note:  Jennifer Cervantes presents today for ivf and pain meds.    Patient seen by provider today: No   present during visit today: Not Applicable.    Note: Pt presents today with 9/10 pain to lower back. Vitals/meds/allergies reviewed and assessment completed. Pt tolerated bolus and total 6mg IV morphine without complication. Pain on discharge improved to less than 5/10.    Intravenous Access:  Implanted Port.    Treatment Conditions:  Not Applicable.    Post Infusion Assessment:  Patient tolerated infusion without incident.     Discharge Plan:   Patient discharged in stable condition accompanied by: self.  Departure Mode: Ambulatory.      Allison Bowman RN

## 2022-08-08 ENCOUNTER — TELEPHONE (OUTPATIENT)
Dept: ONCOLOGY | Facility: CLINIC | Age: 22
End: 2022-08-08

## 2022-08-08 ENCOUNTER — PATIENT OUTREACH (OUTPATIENT)
Dept: CARE COORDINATION | Facility: CLINIC | Age: 22
End: 2022-08-08

## 2022-08-08 ENCOUNTER — INFUSION THERAPY VISIT (OUTPATIENT)
Dept: ONCOLOGY | Facility: CLINIC | Age: 22
End: 2022-08-08
Attending: PEDIATRICS
Payer: COMMERCIAL

## 2022-08-08 DIAGNOSIS — D57.00 SICKLE CELL PAIN CRISIS (H): ICD-10-CM

## 2022-08-08 DIAGNOSIS — G81.10 SPASTIC HEMIPLEGIA, UNSPECIFIED ETIOLOGY, UNSPECIFIED LATERALITY (H): Primary | ICD-10-CM

## 2022-08-08 PROCEDURE — 96361 HYDRATE IV INFUSION ADD-ON: CPT

## 2022-08-08 PROCEDURE — 250N000011 HC RX IP 250 OP 636: Performed by: PEDIATRICS

## 2022-08-08 PROCEDURE — 96376 TX/PRO/DX INJ SAME DRUG ADON: CPT

## 2022-08-08 PROCEDURE — 258N000003 HC RX IP 258 OP 636: Performed by: PEDIATRICS

## 2022-08-08 PROCEDURE — 96374 THER/PROPH/DIAG INJ IV PUSH: CPT

## 2022-08-08 RX ORDER — HEPARIN SODIUM (PORCINE) LOCK FLUSH IV SOLN 100 UNIT/ML 100 UNIT/ML
5 SOLUTION INTRAVENOUS
Status: CANCELLED | OUTPATIENT
Start: 2022-10-01

## 2022-08-08 RX ORDER — MORPHINE SULFATE 2 MG/ML
2 INJECTION, SOLUTION INTRAMUSCULAR; INTRAVENOUS
Status: DISCONTINUED | OUTPATIENT
Start: 2022-08-08 | End: 2022-08-08 | Stop reason: HOSPADM

## 2022-08-08 RX ORDER — ONDANSETRON 8 MG/1
8 TABLET, FILM COATED ORAL
Status: CANCELLED
Start: 2022-10-01

## 2022-08-08 RX ORDER — HEPARIN SODIUM,PORCINE 10 UNIT/ML
5 VIAL (ML) INTRAVENOUS
Status: CANCELLED | OUTPATIENT
Start: 2022-10-01

## 2022-08-08 RX ORDER — MORPHINE SULFATE 2 MG/ML
2 INJECTION, SOLUTION INTRAMUSCULAR; INTRAVENOUS
Status: CANCELLED
Start: 2022-10-01

## 2022-08-08 RX ORDER — HEPARIN SODIUM (PORCINE) LOCK FLUSH IV SOLN 100 UNIT/ML 100 UNIT/ML
5 SOLUTION INTRAVENOUS
Status: DISCONTINUED | OUTPATIENT
Start: 2022-08-08 | End: 2022-08-08 | Stop reason: HOSPADM

## 2022-08-08 RX ORDER — DIPHENHYDRAMINE HCL 25 MG
25 CAPSULE ORAL
Status: CANCELLED
Start: 2022-10-01

## 2022-08-08 RX ADMIN — MORPHINE SULFATE 2 MG: 2 INJECTION, SOLUTION INTRAMUSCULAR; INTRAVENOUS at 13:25

## 2022-08-08 RX ADMIN — MORPHINE SULFATE 2 MG: 2 INJECTION, SOLUTION INTRAMUSCULAR; INTRAVENOUS at 14:16

## 2022-08-08 RX ADMIN — DEXTROSE AND SODIUM CHLORIDE 1000 ML: 5; 450 INJECTION, SOLUTION INTRAVENOUS at 13:20

## 2022-08-08 RX ADMIN — Medication 5 ML: at 15:32

## 2022-08-08 RX ADMIN — MORPHINE SULFATE 2 MG: 2 INJECTION, SOLUTION INTRAMUSCULAR; INTRAVENOUS at 15:07

## 2022-08-08 NOTE — PROGRESS NOTES
Social Work Note: Telephone Call  Oncology Clinic     Data/Intervention:  Patient Name:  Jennifer Cervantes  /Age: 2000, 22 Years Old     Call From: [facility]        Reason for Call:  Transportation     Assessment:   called [employer] to arrange ride through patient's insurance. [employer] arranged  for patient from home with [employer] ([phone]).  Patient will need to call when ready for return ride home ([phone]).      Plan:  Patient is aware of the transportation plan.  available to assist with any other needs.      CARLOS Chavez,[facility]  Hematology/Oncology Social Worker  Phone:[phone] Pager: [phone]

## 2022-08-08 NOTE — PATIENT INSTRUCTIONS
Contact Numbers    [facility] Main Line/[facility]: [phone]    Call with chills and/or temperature greater than or equal to 100.5, uncontrolled nausea/vomiting, diarrhea, constipation, dizziness, shortness of breath, chest pain, bleeding, unexplained bruising, or any new/concerning symptoms, questions/concerns.     If you are having any concerning symptoms or wish to speak to a provider before your next infusion visit, please call your care coordinator or triage to notify them so we can adequately serve you.       After Hours: [phone]    If after hours, weekends, or holidays, call main hospital  and ask for Oncology doctor on call.      August 2022 Sunday Monday Tuesday Wednesday Thursday Friday Saturday        1    ONC INFUSION 2 HR (120 MIN)   8:30 AM   (120 min.)    ONC INFUSION NURSE   [facility] 2     3     4    BMT INFUSION 2 HR (120 MIN)   1:00 PM   (120 min.)    BMT INFUSION NURSE   [facility] [place] 5     6       7     8    ONC INFUSION 2 HR (120 MIN)   1:30 PM   (120 min.)    ONC INFUSION NURSE   [facility] 9     10     11     12     13       14     15     16     17     18     19     20       21     22     23     24     25    [facility] PHYSICAL   2:45 PM   (30 min.)   Suraj Case MD   [facility] Internal Medicine [place] 26     27       28     29    LAB PERIPHERAL  11:30 AM   (15 min.)   [facility] LAB DRAW   [facility]    RETURN  11:45 AM   (30 min.)   Eric Duncan MD   [facility]    ONC INFUSION 2 HR (120 MIN)   1:00 PM   (120 min.)    ONC INFUSION NURSE   [facility] 30 31 September 2022 Sunday Monday Tuesday Wednesday Thursday Friday Saturday                       1     2     3       4     5     6     7     8     9      10       11     12     13     14     15     16     17       18     19     20     21     22     23     24       25     26    LAB PERIPHERAL  11:30 AM   (15 min.)   [facility] LAB DRAW   [facility]    RETURN  11:45 AM   (30 min.)   Eric Duncan MD   [facility]    ONC INFUSION 2 HR (120 MIN)   1:00 PM   (120 min.)    ONC INFUSION NURSE   [facility] 27     28    [facility] PFT   3:15 PM   (30 min.)    PFL D   [facility] Pulmonary Function Testing [facility] RETURN   4:00 PM   (40 min.)   Rosalva Gasca MD   [facility] 29     30                            Lab Results:  No results found for this or any previous visit (from the past 12 hour(s)).

## 2022-08-08 NOTE — TELEPHONE ENCOUNTER
[facility]  Pt confirmed she can come to clinic apt.  Updated Charge nurse.  Messages sent to [facility] to schedule and SW to assist with transportation.     Routed to Patricia Mantilla CNP and MAURISIO Asencio

## 2022-08-08 NOTE — PROGRESS NOTES
Infusion Nursing Note:  Jennifer Cervantes presents today for IVF/pain medications.    Patient seen by provider today: No    Note: Jennifer arrived to clinic with c/o pain on lower back, 9/10, aching and sharp.  Pain goal today is 5/10 by means of repositioning, heat and IV medications.  Goal reached by discharge and pt states she is ready to go.  Denies changes in allergies or medications over weekend.      Intravenous Access:  Implanted Port.    Treatment Conditions:  Not Applicable.    Post Infusion Assessment:  Patient tolerated infusion without incident.  Blood return noted pre and post infusion.  Site patent and intact, free from redness, edema or discomfort.  No evidence of extravasations.  Access discontinued per protocol.    Discharge Plan:   Patient declined prescription refills.  Discharge instructions reviewed with: Patient.  Patient and/or family verbalized understanding of discharge instructions and all questions answered.  AVS to patient via [facility].  Patient will return 8/29/2022 for next appointment with MD.   Patient discharged in stable condition accompanied by: self.  Departure Mode: Ambulatory.    Lupe Bolton RN

## 2022-08-08 NOTE — TELEPHONE ENCOUNTER
Pt called in to triage requesting IVF pain meds for back, lower back  pain rated 8/10 x 2 days. Stated last took prn oxycodone at 0600 this morning without relief. Denied any fevers, chills, cough, sob, chest pain, numbness or tingling or other symptoms not typical of sickle cell pain.     Pt's last infusion was 8/4/22, last clinic visit 7/28 with Patricia Mantilla CNP with follow-up on 8/9/22 with Dr. Duncan.     Patient states needs ride, 25 min from clinic.    Pt denied being out of home medications and needing any refills today.     Pt qualifies for sickle cell standing order protocol.    If you do not hear from the infusion center by 2pm then you will not be able to get in for an infusion today.     Added to infusion wait list.

## 2022-08-10 ENCOUNTER — DOCUMENTATION ONLY (OUTPATIENT)
Dept: ORTHOPEDICS | Facility: CLINIC | Age: 22
End: 2022-08-10

## 2022-08-10 NOTE — PROGRESS NOTES
S: Pt seen at Main lab with 2 rx's one for a Wrist hand orthosis and one for an AFO both for the RT. O: I see the EPIC Rx for the above mentioned devises due to hemiplegia RT side affected/ CVA/ Sickle cell. I see her RT wrist is flexed to approx 80-90 degrees and is not able to get much past that, fingers and thumb are also without much ROM in a somewhat flexed position but more flexible than the wrist. I see with her knee bent and spasticity calm she can attain 90 degree at ankle. She has strong plantarflexion tone and contracture. She uses Baclofen injections every 3 months and is 1 month post her last injection. She is size 7 shoe. A: She was fit with an Micki [employer] Rocker size small RT 2.0. ( strongest AFO available with ease to don). She tried the AFO in my office today and was optimistic about its use. Instructions both written and verbal were given and seemed to be understood. P: The plan is to see [facility] for UE stretching and therapy and possibly a splint due to her lack of ROM at the wrist and fingers, she will also ask for PT for stretching her RT LE and hip. G: The goal of the AFO is to aid in a more neutral ankle position during ambulation.  Electronically signed Jose Eduardo Cabrera CPO, LPO.

## 2022-08-11 ENCOUNTER — NURSE TRIAGE (OUTPATIENT)
Dept: ONCOLOGY | Facility: CLINIC | Age: 22
End: 2022-08-11

## 2022-08-11 ENCOUNTER — PATIENT OUTREACH (OUTPATIENT)
Dept: CARE COORDINATION | Facility: CLINIC | Age: 22
End: 2022-08-11

## 2022-08-11 ENCOUNTER — INFUSION THERAPY VISIT (OUTPATIENT)
Dept: INFUSION THERAPY | Facility: CLINIC | Age: 22
End: 2022-08-11
Attending: PEDIATRICS
Payer: COMMERCIAL

## 2022-08-11 VITALS
DIASTOLIC BLOOD PRESSURE: 86 MMHG | OXYGEN SATURATION: 99 % | HEART RATE: 106 BPM | SYSTOLIC BLOOD PRESSURE: 132 MMHG | TEMPERATURE: 98.1 F | RESPIRATION RATE: 16 BRPM

## 2022-08-11 DIAGNOSIS — D57.00 SICKLE CELL PAIN CRISIS (H): ICD-10-CM

## 2022-08-11 DIAGNOSIS — G81.10 SPASTIC HEMIPLEGIA, UNSPECIFIED ETIOLOGY, UNSPECIFIED LATERALITY (H): Primary | ICD-10-CM

## 2022-08-11 PROCEDURE — 250N000011 HC RX IP 250 OP 636: Performed by: PEDIATRICS

## 2022-08-11 PROCEDURE — 96365 THER/PROPH/DIAG IV INF INIT: CPT

## 2022-08-11 PROCEDURE — 258N000003 HC RX IP 258 OP 636: Performed by: PEDIATRICS

## 2022-08-11 PROCEDURE — 96376 TX/PRO/DX INJ SAME DRUG ADON: CPT

## 2022-08-11 PROCEDURE — 96375 TX/PRO/DX INJ NEW DRUG ADDON: CPT

## 2022-08-11 PROCEDURE — 96366 THER/PROPH/DIAG IV INF ADDON: CPT

## 2022-08-11 RX ORDER — MORPHINE SULFATE 2 MG/ML
2 INJECTION, SOLUTION INTRAMUSCULAR; INTRAVENOUS
Status: DISCONTINUED | OUTPATIENT
Start: 2022-08-11 | End: 2022-08-11 | Stop reason: HOSPADM

## 2022-08-11 RX ORDER — DIPHENHYDRAMINE HCL 25 MG
25 CAPSULE ORAL
Status: CANCELLED
Start: 2022-10-01

## 2022-08-11 RX ORDER — DIPHENHYDRAMINE HCL 25 MG
25 CAPSULE ORAL
Status: DISCONTINUED | OUTPATIENT
Start: 2022-08-11 | End: 2022-08-11 | Stop reason: HOSPADM

## 2022-08-11 RX ORDER — HEPARIN SODIUM (PORCINE) LOCK FLUSH IV SOLN 100 UNIT/ML 100 UNIT/ML
5 SOLUTION INTRAVENOUS
Status: CANCELLED | OUTPATIENT
Start: 2022-10-01

## 2022-08-11 RX ORDER — ONDANSETRON 8 MG/1
8 TABLET, ORALLY DISINTEGRATING ORAL
Status: DISCONTINUED | OUTPATIENT
Start: 2022-08-11 | End: 2022-08-11 | Stop reason: HOSPADM

## 2022-08-11 RX ORDER — OXYCODONE HYDROCHLORIDE 15 MG/1
15 TABLET ORAL EVERY 4 HOURS PRN
Qty: 45 TABLET | Refills: 0 | Status: SHIPPED | OUTPATIENT
Start: 2022-08-11 | End: 2022-08-18

## 2022-08-11 RX ORDER — MORPHINE SULFATE 2 MG/ML
2 INJECTION, SOLUTION INTRAMUSCULAR; INTRAVENOUS
Status: CANCELLED
Start: 2022-10-01

## 2022-08-11 RX ORDER — HEPARIN SODIUM,PORCINE 10 UNIT/ML
5 VIAL (ML) INTRAVENOUS
Status: CANCELLED | OUTPATIENT
Start: 2022-10-01

## 2022-08-11 RX ORDER — ONDANSETRON 8 MG/1
8 TABLET, FILM COATED ORAL
Status: CANCELLED
Start: 2022-10-01

## 2022-08-11 RX ORDER — HEPARIN SODIUM (PORCINE) LOCK FLUSH IV SOLN 100 UNIT/ML 100 UNIT/ML
5 SOLUTION INTRAVENOUS
Status: DISCONTINUED | OUTPATIENT
Start: 2022-08-11 | End: 2022-08-11 | Stop reason: HOSPADM

## 2022-08-11 RX ADMIN — DEXTROSE AND SODIUM CHLORIDE 1000 ML: 5; 450 INJECTION, SOLUTION INTRAVENOUS at 15:13

## 2022-08-11 RX ADMIN — Medication 5 ML: at 17:24

## 2022-08-11 RX ADMIN — MORPHINE SULFATE 2 MG: 2 INJECTION, SOLUTION INTRAMUSCULAR; INTRAVENOUS at 17:11

## 2022-08-11 RX ADMIN — MORPHINE SULFATE 2 MG: 2 INJECTION, SOLUTION INTRAMUSCULAR; INTRAVENOUS at 16:14

## 2022-08-11 RX ADMIN — MORPHINE SULFATE 2 MG: 2 INJECTION, SOLUTION INTRAMUSCULAR; INTRAVENOUS at 15:14

## 2022-08-11 NOTE — TELEPHONE ENCOUNTER
Oncology Nurse Triage - Sickle Cell Pain Crisis:    Treating Provider:   Dr. Perla Sr    Date of last office visit: 07/28/2022    Did they no show to last appointment: No     Date of last infusion: 08/08/2022    Location of pain: low back     Onset of symptoms: a few days, ongoing      Duration of symptoms: 5 days    Does pain feel like patients typical sickle cell pain? Yes     Have home medications been tried:  Yes    Last home medication taken and when: 6 am, oxycodone    Number of doses of home medications have been attempted and when was first dose taken:  1    Other home therapies attempted: HEAT/HEATING PAD and WARM BATH     Is patient out of home medications: No     Has patient been seen in ED or infusion in the last 3 days? (if yes, when): Yes: 08/08    Does patient have active treatment plan?  Yes      Symptom assessment:    Fever (if yes max temperature recorded in last 24 hours):  No    Chest Pain Present (if yes when?): No       Shortness of breath: No     GI symptoms:   none    Urinary symptoms:   none    Oral intake:   Regular diet, tolerating without issues.     Does patient have transportation: No       Grades for reference:       Grade 1 -   o Patient has active treatment plan.   o Patients last scheduled clinic appointment was attended  o Patient has not been seen in infusion or ED in the last 3 days (clarify exclusions in therapy plans)   o Afebrile   o Typically sickle cell pain   o Home medications have been tried   o No other symptoms       Grade 2 -   o Temperature of over 100.0   o Different sickle cell pain   o Decreased PO intake   o Arm or left swelling   o ED or infusion visit within the last 3 days.   o Out of home medications      Grade 3 -   o New chest pain   o New shortness of breath  o Change in mental status     Recommendations:   Okay to add to infusion wait list per protocol

## 2022-08-11 NOTE — PROGRESS NOTES
Nursing Note  Jennifer Cervantes [facility] for:   Chief Complaint   Patient presents with     Infusion     IVF, pain meds     During today's [facility] appointment, orders from Dr. Duncan were completed.  Frequency: PRN    Progress note:  Patient identification verified by name and date of birth.  Assessment completed.  Vitals recorded in Doc Flowsheets.  Patient was provided with education regarding medication/procedure and possible side effects.  Patient verbalized understanding.     present during visit today: Not Applicable.    Treatment Conditions: NA    Premedications: were not ordered.    Drug Waste Record: No    Infusion length and rate:  infusion given over approximately 2 hours    Labs: were not ordered for this appointment.    Vascular access: port accessed today.    Is the next appt scheduled? NA  Asymptomatic COVID test completed? NA    Post Infusion Assessment:  Patient tolerated infusion without incident. Pain down from 9/10 upon arrival to 4/10 and tolerable prior to [facility] discharge.     Discharge Plan:   Follow up plan of care with: ongoing infusions at [facility]., ordering provider as scheduled. and [facility].  Discharge instructions were reviewed with patient.  Patient/representative verbalized understanding of discharge instructions and all questions answered.  Patient discharged from [facility] in stable condition.    Malgorzata Holcomb RN    Administrations This Visit     dextrose 5% and 0.45% NaCl BOLUS     Admin Date  08/11/2022 Action  New Bag Dose  1,000 mL Rate  500 mL/hr Route  Intravenous Administered By  Malgorzata Holcomb RN          heparin 100 UNIT/ML injection 5 mL     Admin Date  08/11/2022 Action  Given Dose  5 mL Route  Intracatheter Administered By  Malgorzata Holcomb RN          morphine (PF) injection 2 mg     Admin  Date  08/11/2022 Action  Given Dose  2 mg Route  Intravenous Administered By  Malgorzata Holcomb, RN           Admin Date  08/11/2022 Action  Given Dose  2 mg Route  Intravenous Administered By  Malgorzata Holcomb RN           Admin Date  08/11/2022 Action  Given Dose  2 mg Route  Intravenous Administered By  Malgorzata Holcomb, RN                /84   Pulse 108   Temp 98.1  F (36.7  C) (Oral)   Resp 18   SpO2 92%

## 2022-08-11 NOTE — PATIENT INSTRUCTIONS
Dear Jennifer Cervantes    Thank you for choosing [facility] ([facility]) for your infusion.  The following information is a summary of our appointment as well as important reminders.      We look forward in seeing you on your next appointment here at [facility] ([facility]).  Please don t hesitate to call us at [phone] to reschedule any of your appointments or to speak with one of the [facility] registered nurses.  It was a pleasure taking care of you today.    Sincerely,    [facility]  [facility]  [address] [place] [address]  [address]  Phone:  [phone]

## 2022-08-11 NOTE — PROGRESS NOTES
Community Health Worker Note: Telephone Call  Oncology Clinic     Data/Intervention:  Patient Name:  Jennifer Cervantes  /Age: [birth year]     Call From: Triage Nurse        Reason for Call:  Transportation      Assessment:  CHW called [employer] ([phone])  to arrange ride through patient's insurance. [employer] arranged a round trip ride as a will call.  Patient will need to call when ready for return ride home [phone]. Talked to patient and she will call [employer] to setup  time     Plan:  Patient is aware of the transportation plan.  available to assist with any other needs.      Isaura OTTO Community Health Worker  Clinic Care Coordination  [facility]  Phone: [phone]

## 2022-08-11 NOTE — TELEPHONE ENCOUNTER
Refill Request    Date of most recent appointment:  07/28 with Patricia Mantilla  Next upcoming appointment:   08/29 with Dr. Duncan    Medication requested:  Oxycodone  Quantity:  45  Last fill date:  08/04  Person requesting refill:  Jennifer    Notes:  Has enough to last till Sunday/Monday

## 2022-08-11 NOTE — LETTER
Date:August 12, 2022      Provider requested that no letter be sent. Do not send.       [facility]

## 2022-08-11 NOTE — LETTER
8/11/2022         RE: Jennifer Cervantes  [address]  [place] [facility] [place] [address]        Dear Colleague,    Thank you for referring your patient, Jennifer Cervantes, to the [facility]VANC[facility] TRE[facility]. Please see a copy of my visit note below.    Nursing Note  Jennifer Cervantes [facility] for:   Chief Complaint   Patient presents with     Infusion     IVF, pain meds     During today's [facility] appointment, orders from Dr. Duncan were completed.  Frequency: PRN    Progress note:  Patient identification verified by name and date of birth.  Assessment completed.  Vitals recorded in Doc Flowsheets.  Patient was provided with education regarding medication/procedure and possible side effects.  Patient verbalized understanding.     present during visit today: Not Applicable.    Treatment Conditions: NA    Premedications: were not ordered.    Drug Waste Record: No    Infusion length and rate:  infusion given over approximately 2 hours    Labs: were not ordered for this appointment.    Vascular access: port accessed today.    Is the next appt scheduled? NA  Asymptomatic COVID test completed? NA    Post Infusion Assessment:  Patient tolerated infusion without incident. Pain down from 9/10 upon arrival to 4/10 and tolerable prior to [facility] discharge.     Discharge Plan:   Follow up plan of care with: ongoing infusions at [facility]., ordering provider as scheduled. and after visit summary to [facility].  Discharge instructions were reviewed with patient.  Patient/representative verbalized understanding of discharge instructions and all questions answered.  Patient discharged from [facility] in stable condition.    Malgorzata Glass RN    Administrations This Visit     dextrose 5% and 0.45% NaCl BOLUS     Admin Date  08/11/2022 Action  New Bag Dose  1,000 mL Rate  500  mL/hr Route  Intravenous Administered By  Malgorzata Holcomb RN          heparin 100 UNIT/ML injection 5 mL     Admin Date  08/11/2022 Action  Given Dose  5 mL Route  Intracatheter Administered By  Malgorzata Holcomb RN          morphine (PF) injection 2 mg     Admin Date  08/11/2022 Action  Given Dose  2 mg Route  Intravenous Administered By  Malgorzata Holcomb RN           Admin Date  08/11/2022 Action  Given Dose  2 mg Route  Intravenous Administered By  Malgorzata Holcomb RN           Admin Date  08/11/2022 Action  Given Dose  2 mg Route  Intravenous Administered By  Malgorzata Holcomb, RN                /84   Pulse 108   Temp 98.1  F (36.7  C) (Oral)   Resp 18   SpO2 92%         Again, thank you for allowing me to participate in the care of your patient.        Sincerely,        [facility]

## 2022-08-11 NOTE — TELEPHONE ENCOUNTER
Jennifer called back checking status of infusion appts today.    As of 1121 no appts available. Informed Jennifer Triage will call. If cannot wait go to emergency room.  If can manage at home call back tomorrow 8/12/22 at 7am.    Jennifer is aware of options.     As of 1151 there is a [facility] appt available for 3:00pm.    1152 Jennifer in agreement, needs transportation assistance.     1154 Isaura-  notified to assist with pt's transportation needs.      1157 Pt is scheduled.

## 2022-08-13 ENCOUNTER — HOSPITAL ENCOUNTER (EMERGENCY)
Facility: CLINIC | Age: 22
Discharge: HOME | End: 2022-08-13
Attending: INTERNAL MEDICINE | Admitting: INTERNAL MEDICINE
Payer: COMMERCIAL

## 2022-08-13 VITALS
OXYGEN SATURATION: 93 % | TEMPERATURE: 98.4 F | RESPIRATION RATE: 16 BRPM | DIASTOLIC BLOOD PRESSURE: 85 MMHG | HEART RATE: 99 BPM | SYSTOLIC BLOOD PRESSURE: 129 MMHG

## 2022-08-13 DIAGNOSIS — M54.50 MIDLINE LOW BACK PAIN WITHOUT SCIATICA, UNSPECIFIED CHRONICITY: ICD-10-CM

## 2022-08-13 DIAGNOSIS — D57.00 SICKLE CELL PAIN CRISIS (H): ICD-10-CM

## 2022-08-13 LAB
ALBUMIN SERPL BCG-MCNC: 4.4 G/DL (ref 3.5–5.2)
ALP SERPL-CCNC: 81 U/L (ref 35–104)
ALT SERPL W P-5'-P-CCNC: 58 U/L (ref 10–35)
ANION GAP SERPL CALCULATED.3IONS-SCNC: 14 MMOL/L (ref 7–15)
AST SERPL W P-5'-P-CCNC: ABNORMAL U/L
BASOPHILS # BLD AUTO: 0.2 10E3/UL (ref 0–0.2)
BASOPHILS NFR BLD AUTO: 2 %
BILIRUB SERPL-MCNC: 3.7 MG/DL
BUN SERPL-MCNC: 5.7 MG/DL (ref 6–20)
CALCIUM SERPL-MCNC: 9.1 MG/DL (ref 8.6–10)
CHLORIDE SERPL-SCNC: 107 MMOL/L (ref 98–107)
CREAT SERPL-MCNC: 0.55 MG/DL (ref 0.51–0.95)
DEPRECATED HCO3 PLAS-SCNC: 19 MMOL/L (ref 22–29)
EOSINOPHIL # BLD AUTO: 0.5 10E3/UL (ref 0–0.7)
EOSINOPHIL NFR BLD AUTO: 4 %
ERYTHROCYTE [DISTWIDTH] IN BLOOD BY AUTOMATED COUNT: 28 % (ref 10–15)
GFR SERPL CREATININE-BSD FRML MDRD: >90 ML/MIN/1.73M2
GLUCOSE SERPL-MCNC: 95 MG/DL (ref 70–99)
HCT VFR BLD AUTO: 23.5 % (ref 35–47)
HGB BLD-MCNC: 8.3 G/DL (ref 11.7–15.7)
HOLD SPECIMEN: NORMAL
IMM GRANULOCYTES # BLD: 0 10E3/UL
IMM GRANULOCYTES NFR BLD: 0 %
LYMPHOCYTES # BLD AUTO: 3.4 10E3/UL (ref 0.8–5.3)
LYMPHOCYTES NFR BLD AUTO: 28 %
MCH RBC QN AUTO: 30.5 PG (ref 26.5–33)
MCHC RBC AUTO-ENTMCNC: 35.3 G/DL (ref 31.5–36.5)
MCV RBC AUTO: 86 FL (ref 78–100)
MONOCYTES # BLD AUTO: 0.7 10E3/UL (ref 0–1.3)
MONOCYTES NFR BLD AUTO: 5 %
NEUTROPHILS # BLD AUTO: 7.2 10E3/UL (ref 1.6–8.3)
NEUTROPHILS NFR BLD AUTO: 61 %
NRBC # BLD AUTO: 0.1 10E3/UL
NRBC BLD AUTO-RTO: 1 /100
PLATELET # BLD AUTO: 419 10E3/UL (ref 150–450)
POTASSIUM SERPL-SCNC: 3.3 MMOL/L (ref 3.4–5.3)
PROT SERPL-MCNC: 7.6 G/DL (ref 6.4–8.3)
RBC # BLD AUTO: 2.72 10E6/UL (ref 3.8–5.2)
RETICS # AUTO: 0.51 10E6/UL (ref 0.03–0.1)
RETICS/RBC NFR AUTO: 19.4 % (ref 0.5–2)
SODIUM SERPL-SCNC: 140 MMOL/L (ref 136–145)
WBC # BLD AUTO: 12.1 10E3/UL (ref 4–11)

## 2022-08-13 PROCEDURE — 258N000003 HC RX IP 258 OP 636: Performed by: INTERNAL MEDICINE

## 2022-08-13 PROCEDURE — 96376 TX/PRO/DX INJ SAME DRUG ADON: CPT | Performed by: INTERNAL MEDICINE

## 2022-08-13 PROCEDURE — 36415 COLL VENOUS BLD VENIPUNCTURE: CPT | Performed by: INTERNAL MEDICINE

## 2022-08-13 PROCEDURE — 96374 THER/PROPH/DIAG INJ IV PUSH: CPT | Performed by: INTERNAL MEDICINE

## 2022-08-13 PROCEDURE — 96361 HYDRATE IV INFUSION ADD-ON: CPT | Performed by: INTERNAL MEDICINE

## 2022-08-13 PROCEDURE — 85004 AUTOMATED DIFF WBC COUNT: CPT | Performed by: INTERNAL MEDICINE

## 2022-08-13 PROCEDURE — 250N000011 HC RX IP 250 OP 636: Performed by: INTERNAL MEDICINE

## 2022-08-13 PROCEDURE — 99285 EMERGENCY DEPT VISIT HI MDM: CPT | Performed by: INTERNAL MEDICINE

## 2022-08-13 PROCEDURE — 84155 ASSAY OF PROTEIN SERUM: CPT | Performed by: INTERNAL MEDICINE

## 2022-08-13 PROCEDURE — 250N000013 HC RX MED GY IP 250 OP 250 PS 637: Performed by: INTERNAL MEDICINE

## 2022-08-13 PROCEDURE — 96375 TX/PRO/DX INJ NEW DRUG ADDON: CPT | Performed by: INTERNAL MEDICINE

## 2022-08-13 PROCEDURE — 250N000009 HC RX 250: Performed by: INTERNAL MEDICINE

## 2022-08-13 PROCEDURE — 85045 AUTOMATED RETICULOCYTE COUNT: CPT | Performed by: INTERNAL MEDICINE

## 2022-08-13 PROCEDURE — 99285 EMERGENCY DEPT VISIT HI MDM: CPT | Mod: 25 | Performed by: INTERNAL MEDICINE

## 2022-08-13 RX ORDER — OXYCODONE HYDROCHLORIDE 10 MG/1
10 TABLET ORAL ONCE
Status: COMPLETED | OUTPATIENT
Start: 2022-08-13 | End: 2022-08-13

## 2022-08-13 RX ORDER — HEPARIN SODIUM (PORCINE) LOCK FLUSH IV SOLN 100 UNIT/ML 100 UNIT/ML
5 SOLUTION INTRAVENOUS
Status: DISCONTINUED | OUTPATIENT
Start: 2022-08-13 | End: 2022-08-14 | Stop reason: HOSPADM

## 2022-08-13 RX ORDER — SODIUM CHLORIDE, SODIUM LACTATE, POTASSIUM CHLORIDE, CALCIUM CHLORIDE 600; 310; 30; 20 MG/100ML; MG/100ML; MG/100ML; MG/100ML
INJECTION, SOLUTION INTRAVENOUS CONTINUOUS
Status: DISCONTINUED | OUTPATIENT
Start: 2022-08-13 | End: 2022-08-14 | Stop reason: HOSPADM

## 2022-08-13 RX ORDER — KETOROLAC TROMETHAMINE 15 MG/ML
15 INJECTION, SOLUTION INTRAMUSCULAR; INTRAVENOUS ONCE
Status: COMPLETED | OUTPATIENT
Start: 2022-08-13 | End: 2022-08-13

## 2022-08-13 RX ADMIN — KETOROLAC TROMETHAMINE 15 MG: 15 INJECTION, SOLUTION INTRAMUSCULAR; INTRAVENOUS at 21:02

## 2022-08-13 RX ADMIN — OXYCODONE HYDROCHLORIDE 10 MG: 10 TABLET ORAL at 22:06

## 2022-08-13 RX ADMIN — Medication 5 ML: at 22:52

## 2022-08-13 RX ADMIN — Medication 4 MG: at 21:02

## 2022-08-13 RX ADMIN — Medication 4 MG: at 22:06

## 2022-08-13 RX ADMIN — OXYCODONE HYDROCHLORIDE 15 MG: 5 TABLET ORAL at 21:02

## 2022-08-13 RX ADMIN — SODIUM CHLORIDE, POTASSIUM CHLORIDE, SODIUM LACTATE AND CALCIUM CHLORIDE: 600; 310; 30; 20 INJECTION, SOLUTION INTRAVENOUS at 21:02

## 2022-08-13 ASSESSMENT — ENCOUNTER SYMPTOMS
ABDOMINAL PAIN: 0
CHILLS: 0
BACK PAIN: 1
VOMITING: 0
CONFUSION: 0
FEVER: 0
ADENOPATHY: 0
NUMBNESS: 0
SHORTNESS OF BREATH: 0
NAUSEA: 0
MYALGIAS: 1
HEADACHES: 0
COUGH: 0
SORE THROAT: 0
WHEEZING: 0
DYSURIA: 0
WEAKNESS: 1

## 2022-08-13 ASSESSMENT — ACTIVITIES OF DAILY LIVING (ADL)
ADLS_ACUITY_SCORE: 35
ADLS_ACUITY_SCORE: 35

## 2022-08-14 NOTE — DISCHARGE INSTRUCTIONS
Continue your regular home medications.   Follow up with the infusion center and with Dr Duncan.  Return as needed for new or worsening symptoms.

## 2022-08-14 NOTE — ED PROVIDER NOTES
ED Provider Note  [facility]      History     Chief Complaint   Patient presents with     Sickle Cell Pain Crisis     HPI  Jennifer Cervantes is a 22 year old female who presents with lower back pain similar to past sickle crisis pain since this morning. She has taken her home medication without relief. She denies fever, chills, URI symptoms, sore throat, cough, sputum, shortness of breath, nausea, vomiting, abdominal pain, leg pain or swelling. She denies headache or visual changes. She has chronic right hemiparesis unchanged. She was last at the infusion center 2 days ago.    Past Medical History:   Diagnosis Date     Anxiety      Bleeding disorder (H)      Blood clotting disorder (H)      Cerebral infarction (H) 2015     Cognitive developmental delay     low IQ. Please recognize when managing pain and planning with her     Depressive disorder      Hemiplegia and hemiparesis following cerebral infarction affecting right dominant side (H)     right hand contractures     Iron overload due to repeated red blood cell transfusions      Migraines      Multiple subsegmental pulmonary emboli without acute cor pulmonale (H) 02/01/2021     Oppositional defiant behavior      Superficial venous thrombosis of arm, right 03/25/2021     Uncomplicated asthma        Past Surgical History:   Procedure Laterality Date     AS INSERT TUNNELED CV 2 CATH W/O PORT/PUMP       CHOLECYSTECTOMY       CV RIGHT HEART CATH MEASUREMENTS RECORDED N/A 11/18/2021    Procedure: Right Heart Cath;  Surgeon: Jackson Stauffer MD;  Location:  HEART CARDIAC CATH LAB     INSERT PORT VASCULAR ACCESS Left 4/21/2021    Procedure: INSERTION, VASCULAR ACCESS PORT (NOT SURE ON SIDE UNTIL REMOVAL);  Surgeon: Rajan More MD;  Location: [facility] OR     IR CHEST PORT PLACEMENT > 5 YRS OF AGE  4/21/2021     IR CVC NON TUNNEL LINE REMOVAL  5/6/2021     IR CVC NON TUNNEL PLACEMENT  04/07/2020     IR CVC NON TUNNEL PLACEMENT  4/30/2021     IR  PORT REMOVAL LEFT  4/21/2021     REMOVE PORT VASCULAR ACCESS Left 4/21/2021    Procedure: REMOVAL, VASCULAR ACCESS PORT LEFT;  Surgeon: Rajan More MD;  Location: [facility] OR     REPAIR TENDON ELBOW Right 10/02/2019    Procedure: Right Elbow Flexor Lengthening, Flexor Pronator Slide Of Wrist and Finger, Thumb Adductor Lengthening;  Surgeon: Anai Franco MD;  Location: [facility] OR     TONSILLECTOMY Bilateral 10/02/2019    Procedure: Bilateral Tonsillectomy;  Surgeon: Farhana Guy MD;  Location: [facility] OR     ZZC BREAST REDUCTION (INCLUDES LIPO) TIER 3 Bilateral 04/23/2019       Family History   Problem Relation Age of Onset     Sickle Cell Trait Mother      Hypertension Mother      Asthma Mother      Sickle Cell Trait Father      Glaucoma No family hx of      Macular Degeneration No family hx of        Social History     Tobacco Use     Smoking status: Never Smoker     Smokeless tobacco: Never Used   Substance Use Topics     Alcohol use: Not Currently     Alcohol/week: 0.0 standard drinks          Review of Systems   Constitutional: Negative for chills and fever.   HENT: Negative for congestion and sore throat.    Eyes: Negative for visual disturbance.   Respiratory: Negative for cough, shortness of breath and wheezing.    Cardiovascular: Negative for chest pain and leg swelling.   Gastrointestinal: Negative for abdominal pain, nausea and vomiting.   Genitourinary: Negative for dysuria.   Musculoskeletal: Positive for back pain and myalgias.   Skin: Negative for rash.   Neurological: Positive for weakness. Negative for numbness and headaches.   Hematological: Negative for adenopathy.   Psychiatric/Behavioral: Negative for confusion.         Physical Exam   BP: 123/77  Pulse: 103  Temp: 98.4  F (36.9  C)  Resp: 20  SpO2: 90 %  Physical Exam  Vitals and nursing note reviewed.   Constitutional:       General: She is not in acute distress.     Appearance: Normal appearance.   HENT:      Head: Normocephalic  and atraumatic.      Right Ear: External ear normal.      Left Ear: External ear normal.      Nose: Nose normal.      Mouth/Throat:      Mouth: Mucous membranes are moist.   Eyes:      Pupils: Pupils are equal, round, and reactive to light.      Comments: Slight gaze dysmetria, nystagmus   Cardiovascular:      Rate and Rhythm: Normal rate and regular rhythm.      Heart sounds: No murmur heard.  Pulmonary:      Effort: Pulmonary effort is normal.      Breath sounds: Normal breath sounds. No wheezing or rales.   Abdominal:      Tenderness: There is no abdominal tenderness.   Musculoskeletal:      Cervical back: Normal range of motion.      Right lower leg: No edema.      Left lower leg: No edema.   Skin:     General: Skin is warm and dry.   Neurological:      Mental Status: She is alert.      Motor: Weakness (right hemiparesis) present.   Psychiatric:         Mood and Affect: Mood normal.         Behavior: Behavior normal.         ED Course      Procedures         Labs/Imaging    Results for orders placed or performed during the hospital encounter of 08/13/22 (from the past 24 hour(s))   CBC with platelets differential    Narrative    The following orders were created for panel order CBC with platelets differential.  Procedure                               Abnormality         Status                     ---------                               -----------         ------                     CBC with platelets and d...[[id number]]  Abnormal            Final result                 Please view results for these tests on the individual orders.   Comprehensive metabolic panel   Result Value Ref Range    Sodium 140 136 - 145 mmol/L    Potassium 3.3 (L) 3.4 - 5.3 mmol/L    Creatinine 0.55 0.51 - 0.95 mg/dL    Urea Nitrogen 5.7 (L) 6.0 - 20.0 mg/dL    Chloride 107 98 - 107 mmol/L    Carbon Dioxide (CO2) 19 (L) 22 - 29 mmol/L    Anion Gap 14 7 - 15 mmol/L    Glucose 95 70 - 99 mg/dL    Calcium 9.1 8.6 - 10.0 mg/dL    Protein Total  7.6 6.4 - 8.3 g/dL    Albumin 4.4 3.5 - 5.2 g/dL    Bilirubin Total 3.7 (H) <=1.2 mg/dL    Alkaline Phosphatase 81 35 - 104 U/L    AST      ALT 58 (H) 10 - 35 U/L    GFR Estimate >90 >60 mL/min/1.73m2   Reticulocyte count   Result Value Ref Range    % Reticulocyte 19.4 (H) 0.5 - 2.0 %    Absolute Reticulocyte 0.514 (H) 0.025 - 0.095 10e6/uL   [facility] Draw    Narrative    The following orders were created for panel order [facility] Draw.  Procedure                               Abnormality         Status                     ---------                               -----------         ------                     Extra Blue Top Tube[[id number]]                              Final result                 Please view results for these tests on the individual orders.   CBC with platelets and differential   Result Value Ref Range    WBC Count 12.1 (H) 4.0 - 11.0 10e3/uL    RBC Count 2.72 (L) 3.80 - 5.20 10e6/uL    Hemoglobin 8.3 (L) 11.7 - 15.7 g/dL    Hematocrit 23.5 (L) 35.0 - 47.0 %    MCV 86 78 - 100 fL    MCH 30.5 26.5 - 33.0 pg    MCHC 35.3 31.5 - 36.5 g/dL    RDW 28.0 (H) 10.0 - 15.0 %    Platelet Count 419 150 - 450 10e3/uL    % Neutrophils 61 %    % Lymphocytes 28 %    % Monocytes 5 %    % Eosinophils 4 %    % Basophils 2 %    % Immature Granulocytes 0 %    NRBCs per 100 WBC 1 (H) <1 /100    Absolute Neutrophils 7.2 1.6 - 8.3 10e3/uL    Absolute Lymphocytes 3.4 0.8 - 5.3 10e3/uL    Absolute Monocytes 0.7 0.0 - 1.3 10e3/uL    Absolute Eosinophils 0.5 0.0 - 0.7 10e3/uL    Absolute Basophils 0.2 0.0 - 0.2 10e3/uL    Absolute Immature Granulocytes 0.0 <=0.4 10e3/uL    Absolute NRBCs 0.1 10e3/uL   Extra Blue Top Tube   Result Value Ref Range    Hold Specimen JIC      *Note: Due to a large number of results and/or encounters for the requested time period, some results have not been displayed. A complete set of results can be found in Results Review.       2240: Pain much improved. Requesting discharge to home.    Medications    lactated ringers infusion (0 mLs Intravenous Stopped 8/13/22 2246)   heparin 100 UNIT/ML injection 5 mL (has no administration in time range)   ketamine (KETALAR) injection 4 mg (4 mg Intravenous Given 8/13/22 2102)   ketorolac (TORADOL) injection 15 mg (15 mg Intravenous Given 8/13/22 2102)   oxyCODONE (ROXICODONE) tablet 15 mg (15 mg Oral Given 8/13/22 2102)   ketamine (KETALAR) injection 4 mg (4 mg Intravenous Given 8/13/22 2206)   oxyCODONE IR (ROXICODONE) tablet 10 mg (10 mg Oral Given 8/13/22 2206)        Assessments & Plan (with Medical Decision Making)   Impression:  Young woman with history of sickle cell disease, past stroke with residual right hemiparesis, depression, blood clots, migraine and cognitive delay presents with lower back pain typical of past sickle cell pain not controlled with her home medication regimen. She has been primarily managing with twice weekly visits to the infusion center and with home medications recently. She has no current symptoms of infectious process. She has no symptoms of acute chest syndrome and no symptoms of neurologic event. Labs including CBC, electrolytes, bilirubin and reticulocyte count are at baseline. Pain improved with treatment in the ED with IV fluid, ketamine x 2, oral oxycodone and IV toradol.     I have reviewed the nursing notes. I have reviewed the findings, diagnosis, plan and need for follow up with the patient.    New Prescriptions    No medications on file       Final diagnoses:   Sickle cell pain crisis (H)   Midline low back pain without sciatica, unspecified chronicity       --  Jose Manuel Calderón  [facility] EMERGENCY DEPARTMENT  8/13/2022     Jose Manuel Calderón MD  08/13/22 3248

## 2022-08-14 NOTE — ED TRIAGE NOTES
Pt arrived to ED complaining of constant back since yesterday . Pt states she has tried warm heating pads, baths and oxycodone but did not help . Pt breathing sat respirations of 20 and appears calm at this time

## 2022-08-14 NOTE — ED NOTES
Bed: ED22  Expected date:   Expected time:   Means of arrival:   Comments:  Triage 1 - Baljit's room

## 2022-08-15 ENCOUNTER — TELEPHONE (OUTPATIENT)
Dept: ONCOLOGY | Facility: CLINIC | Age: 22
End: 2022-08-15

## 2022-08-15 ENCOUNTER — PATIENT OUTREACH (OUTPATIENT)
Dept: ONCOLOGY | Facility: CLINIC | Age: 22
End: 2022-08-15

## 2022-08-15 ENCOUNTER — INFUSION THERAPY VISIT (OUTPATIENT)
Dept: INFUSION THERAPY | Facility: CLINIC | Age: 22
End: 2022-08-15
Attending: PEDIATRICS
Payer: COMMERCIAL

## 2022-08-15 ENCOUNTER — PATIENT OUTREACH (OUTPATIENT)
Dept: CARE COORDINATION | Facility: CLINIC | Age: 22
End: 2022-08-15

## 2022-08-15 VITALS
TEMPERATURE: 98.1 F | OXYGEN SATURATION: 95 % | SYSTOLIC BLOOD PRESSURE: 145 MMHG | HEART RATE: 99 BPM | DIASTOLIC BLOOD PRESSURE: 80 MMHG | RESPIRATION RATE: 16 BRPM

## 2022-08-15 DIAGNOSIS — G81.10 SPASTIC HEMIPLEGIA, UNSPECIFIED ETIOLOGY, UNSPECIFIED LATERALITY (H): Primary | ICD-10-CM

## 2022-08-15 DIAGNOSIS — D57.00 SICKLE CELL PAIN CRISIS (H): ICD-10-CM

## 2022-08-15 PROCEDURE — 258N000003 HC RX IP 258 OP 636: Performed by: PEDIATRICS

## 2022-08-15 PROCEDURE — 250N000011 HC RX IP 250 OP 636: Performed by: PEDIATRICS

## 2022-08-15 PROCEDURE — 96376 TX/PRO/DX INJ SAME DRUG ADON: CPT

## 2022-08-15 PROCEDURE — 96361 HYDRATE IV INFUSION ADD-ON: CPT

## 2022-08-15 PROCEDURE — 96374 THER/PROPH/DIAG INJ IV PUSH: CPT

## 2022-08-15 RX ORDER — HEPARIN SODIUM (PORCINE) LOCK FLUSH IV SOLN 100 UNIT/ML 100 UNIT/ML
5 SOLUTION INTRAVENOUS
Status: DISCONTINUED | OUTPATIENT
Start: 2022-08-15 | End: 2022-08-15 | Stop reason: HOSPADM

## 2022-08-15 RX ORDER — DIPHENHYDRAMINE HCL 25 MG
25 CAPSULE ORAL
Status: CANCELLED
Start: 2022-10-01

## 2022-08-15 RX ORDER — ONDANSETRON 8 MG/1
8 TABLET, FILM COATED ORAL
Status: CANCELLED
Start: 2022-10-01

## 2022-08-15 RX ORDER — MORPHINE SULFATE 2 MG/ML
2 INJECTION, SOLUTION INTRAMUSCULAR; INTRAVENOUS
Status: CANCELLED
Start: 2022-10-01

## 2022-08-15 RX ORDER — MORPHINE SULFATE 2 MG/ML
2 INJECTION, SOLUTION INTRAMUSCULAR; INTRAVENOUS
Status: COMPLETED | OUTPATIENT
Start: 2022-08-15 | End: 2022-08-15

## 2022-08-15 RX ORDER — HEPARIN SODIUM (PORCINE) LOCK FLUSH IV SOLN 100 UNIT/ML 100 UNIT/ML
5 SOLUTION INTRAVENOUS
Status: CANCELLED | OUTPATIENT
Start: 2022-10-01

## 2022-08-15 RX ORDER — HEPARIN SODIUM,PORCINE 10 UNIT/ML
5 VIAL (ML) INTRAVENOUS
Status: CANCELLED | OUTPATIENT
Start: 2022-10-01

## 2022-08-15 RX ADMIN — MORPHINE SULFATE 2 MG: 2 INJECTION, SOLUTION INTRAMUSCULAR; INTRAVENOUS at 13:04

## 2022-08-15 RX ADMIN — MORPHINE SULFATE 2 MG: 2 INJECTION, SOLUTION INTRAMUSCULAR; INTRAVENOUS at 11:02

## 2022-08-15 RX ADMIN — DEXTROSE AND SODIUM CHLORIDE 1000 ML: 5; 450 INJECTION, SOLUTION INTRAVENOUS at 11:02

## 2022-08-15 RX ADMIN — Medication 5 ML: at 13:19

## 2022-08-15 RX ADMIN — MORPHINE SULFATE 2 MG: 2 INJECTION, SOLUTION INTRAMUSCULAR; INTRAVENOUS at 12:01

## 2022-08-15 NOTE — PROGRESS NOTES
SW received referral to assist with transportation coordination. SW called [employer] and arranged ride for patient. SW informed patient and clinic of ride information [employer] will  patient around 10am. Patient will call [employer] for return ride when appointment is complete. SW will remain available as needed.         Corry GONZALEZ, [facility]  - Oncology  Phone : [phone]  Pager: [phone]

## 2022-08-15 NOTE — PROGRESS NOTES
Post hospital call cancelled.  Pt has appt in infusion today.      Arely Krueger, RN  RN Care Coordinator  [phone] clinic main line

## 2022-08-15 NOTE — PATIENT INSTRUCTIONS
Dear Jennifer Cervantes    Thank you for choosing [facility] ([facility]) for your infusion.  The following information is a summary of our appointment as well as important reminders.      We look forward in seeing you on your next appointment here at [facility] ([facility]).  Please don t hesitate to call us at [phone] to reschedule any of your appointments or to speak with one of the [facility] registered nurses.  It was a pleasure taking care of you today.    Sincerely,    [facility]  [facility]  [address] [place] [address]  [address]  Phone:  [phone]

## 2022-08-15 NOTE — PROGRESS NOTES
Nursing Note  Jennifer Cervantes presents today [facility] for:   Chief Complaint   Patient presents with     Infusion     IVF/pain meds     During today's [facility] appointment, orders from Dr. Duncan were completed.  Frequency: PRN    Progress note:  Patient identification verified by name and date of birth.  Assessment completed.  Vitals recorded in Doc Flowsheets.  Patient was provided with education regarding medication/procedure and possible side effects.  Patient verbalized understanding.     present during visit today: Not Applicable.    Treatment Conditions: patient met parameters for infusion per triage    Premedications: were not ordered.    Drug Waste Record: No    Infusion length and rate:  infusion given over approximately 2 hours    Labs: were not ordered for this appointment.    Vascular access: port accessed today.    Is the next appt scheduled? NA  Asymptomatic COVID test completed? NA    Post Infusion Assessment:  Patient tolerated infusion without incident. Back pain down from 9/10 upon arrival to 4/10 and tolerable at [facility] discharge.    Discharge Plan:   Follow up plan of care with: ongoing infusions at [facility]., ordering provider as scheduled. and AVS to [facility]  Discharge instructions were reviewed with patient.  Patient/representative verbalized understanding of discharge instructions and all questions answered.  Patient discharged from [facility] in stable condition.    Malgorzata Holcomb RN       Administrations This Visit     dextrose 5% and 0.45% NaCl BOLUS     Admin Date  08/15/2022 Action  New Bag Dose  1,000 mL Rate  500 mL/hr Route  Intravenous Administered By  Malgorzata Holcomb RN          heparin 100 UNIT/ML injection 5 mL     Admin Date  08/15/2022 Action  Given Dose  5 mL Route  Intracatheter Administered By  Malgorzata Holcomb RN          morphine (PF)  injection 2 mg     Admin Date  08/15/2022 Action  Given Dose  2 mg Route  Intravenous Administered By  Malgorzata Holcomb, RN           Admin Date  08/15/2022 Action  Given Dose  2 mg Route  Intravenous Administered By  Malgorzata Holcomb, RN           Admin Date  08/15/2022 Action  Given Dose  2 mg Route  Intravenous Administered By  Malgorzata Holcomb, RN                BP (!) 143/82   Pulse 115   Temp 98.1  F (36.7  C) (Oral)   Resp 16   SpO2 92%

## 2022-08-15 NOTE — TELEPHONE ENCOUNTER
Pt called in to triage requesting IVF pain meds for back  pain rated 9/10 x 2-3 days. Stated last took prn oxycodone at 6am this morning without relief. Denied any fevers, chills, cough, sob, chest pain, numbness or tingling or other symptoms not typical of sickle cell pain.   Pt's last infusion was 8/11/22, last clinic visit 7/28/22 with follow-up on 8/29/22 with Dr Duncan .   Patient states they do not have own ride and it will take 60 minutes long to get to cancer clinic.   Pt denied being out of home medications and needing any refills today.   Pt qualifies for sickle cell standing order protocol.  If you do not hear from the infusion center by 2pm then you will not be able to get in for an infusion today.   Please note, if you are late for your appt, you risk losing your infusion appt as it may delay another patient's infusion who arrived on time.    [facility]     Call placed to Jennifer who confirms that she can come in at 11:00am.     Message sent to social work to arrange transportation     Message sent to [facility] to schedule appointment.

## 2022-08-15 NOTE — LETTER
8/15/2022         RE: Jennifer Cervantes  [address]  [place] [address]        Dear Colleague,    Thank you for referring your patient, Jennifer Cervantes, to the [facility]. Please see a copy of my visit note below.    Nursing Note  Jennifer Cervantes presents [facility] for:   Chief Complaint   Patient presents with     Infusion     IVF/pain meds     During today's [facility] appointment, orders from Dr. Duncan were completed.  Frequency: PRN    Progress note:  Patient identification verified by name and date of birth.  Assessment completed.  Vitals recorded in Doc Flowsheets.  Patient was provided with education regarding medication/procedure and possible side effects.  Patient verbalized understanding.     present during visit today: Not Applicable.    Treatment Conditions: patient met parameters for infusion per triage    Premedications: were not ordered.    Drug Waste Record: No    Infusion length and rate:  infusion given over approximately 2 hours    Labs: were not ordered for this appointment.    Vascular access: port accessed today.    Is the next appt scheduled? NA  Asymptomatic COVID test completed? NA    Post Infusion Assessment:  Patient tolerated infusion without incident. Back pain down from 9/10 upon arrival to 4/10 and tolerable at [facility] discharge.    Discharge Plan:   Follow up plan of care with: ongoing infusions at [facility]., ordering provider as scheduled. and AVS to [facility]  Discharge instructions were reviewed with patient.  Patient/representative verbalized understanding of discharge instructions and all questions answered.  Patient discharged from [facility] in stable condition.    Malgorzata Glass RN       Administrations This Visit     dextrose 5% and 0.45% NaCl BOLUS     Admin Date  08/15/2022 Action  New Bag  Dose  1,000 mL Rate  500 mL/hr Route  Intravenous Administered By  Malgorzata Holcomb RN          heparin 100 UNIT/ML injection 5 mL     Admin Date  08/15/2022 Action  Given Dose  5 mL Route  Intracatheter Administered By  Malgorzata Holcomb RN          morphine (PF) injection 2 mg     Admin Date  08/15/2022 Action  Given Dose  2 mg Route  Intravenous Administered By  Malgorzata Holcomb RN           Admin Date  08/15/2022 Action  Given Dose  2 mg Route  Intravenous Administered By  Malgorzata Holcomb RN           Admin Date  08/15/2022 Action  Given Dose  2 mg Route  Intravenous Administered By  Malgorzata Holcomb RN                BP (!) 143/82   Pulse 115   Temp 98.1  F (36.7  C) (Oral)   Resp 16   SpO2 92%         Again, thank you for allowing me to participate in the care of your patient.        Sincerely,        [facility]

## 2022-08-18 DIAGNOSIS — D57.00 SICKLE CELL PAIN CRISIS (H): ICD-10-CM

## 2022-08-18 RX ORDER — OXYCODONE HYDROCHLORIDE 15 MG/1
15 TABLET ORAL EVERY 4 HOURS PRN
Qty: 45 TABLET | Refills: 0 | Status: SHIPPED | OUTPATIENT
Start: 2022-08-18 | End: 2022-08-25

## 2022-08-18 NOTE — TELEPHONE ENCOUNTER
Narcotic Refill Request    Medication(s) requested:  Oxycodone IR 15 mg tablet  Person Requesting Refill:Jennifer  What pain is the medication treating: sickle cell pain  How is the medication being taken?:taking it once every 4 hours  Does pt have enough for today? Yes  Is pain being adequately controlled on the current regimen?: Yes  Experiencing any side effects from medication?: No    Date of most recent appointment:  7/28/22 with Patricia Mantilla  Any No Show Visits:No  Next appointment:   8/29/22 with Dr. Duncan  Last fill date and by whom:  Filled on 8/11/22 by Patricia Mantilla CNP   Reviewed: No    Routed/Paged provider: Pended and routed to Patricia Mantilla CNP

## 2022-08-19 ENCOUNTER — TELEPHONE (OUTPATIENT)
Dept: ONCOLOGY | Facility: CLINIC | Age: 22
End: 2022-08-19

## 2022-08-19 ENCOUNTER — PATIENT OUTREACH (OUTPATIENT)
Dept: CARE COORDINATION | Facility: CLINIC | Age: 22
End: 2022-08-19

## 2022-08-19 ENCOUNTER — INFUSION THERAPY VISIT (OUTPATIENT)
Dept: TRANSPLANT | Facility: CLINIC | Age: 22
End: 2022-08-19
Attending: PEDIATRICS
Payer: COMMERCIAL

## 2022-08-19 VITALS
OXYGEN SATURATION: 93 % | TEMPERATURE: 98.2 F | HEART RATE: 119 BPM | RESPIRATION RATE: 22 BRPM | DIASTOLIC BLOOD PRESSURE: 86 MMHG | SYSTOLIC BLOOD PRESSURE: 146 MMHG

## 2022-08-19 DIAGNOSIS — D57.00 SICKLE CELL PAIN CRISIS (H): ICD-10-CM

## 2022-08-19 DIAGNOSIS — G81.10 SPASTIC HEMIPLEGIA, UNSPECIFIED ETIOLOGY, UNSPECIFIED LATERALITY (H): Primary | ICD-10-CM

## 2022-08-19 PROCEDURE — 96376 TX/PRO/DX INJ SAME DRUG ADON: CPT

## 2022-08-19 PROCEDURE — 96361 HYDRATE IV INFUSION ADD-ON: CPT

## 2022-08-19 PROCEDURE — 258N000003 HC RX IP 258 OP 636: Performed by: PEDIATRICS

## 2022-08-19 PROCEDURE — 250N000011 HC RX IP 250 OP 636: Performed by: PEDIATRICS

## 2022-08-19 PROCEDURE — 96374 THER/PROPH/DIAG INJ IV PUSH: CPT

## 2022-08-19 RX ORDER — HEPARIN SODIUM,PORCINE 10 UNIT/ML
5 VIAL (ML) INTRAVENOUS
Status: CANCELLED | OUTPATIENT
Start: 2022-10-01

## 2022-08-19 RX ORDER — HEPARIN SODIUM (PORCINE) LOCK FLUSH IV SOLN 100 UNIT/ML 100 UNIT/ML
5 SOLUTION INTRAVENOUS EVERY 8 HOURS
Status: DISCONTINUED | OUTPATIENT
Start: 2022-08-19 | End: 2022-08-19 | Stop reason: HOSPADM

## 2022-08-19 RX ORDER — HEPARIN SODIUM (PORCINE) LOCK FLUSH IV SOLN 100 UNIT/ML 100 UNIT/ML
5 SOLUTION INTRAVENOUS
Status: CANCELLED | OUTPATIENT
Start: 2022-10-01

## 2022-08-19 RX ORDER — MORPHINE SULFATE 2 MG/ML
2 INJECTION, SOLUTION INTRAMUSCULAR; INTRAVENOUS
Status: DISCONTINUED | OUTPATIENT
Start: 2022-08-19 | End: 2022-08-19 | Stop reason: HOSPADM

## 2022-08-19 RX ORDER — MORPHINE SULFATE 2 MG/ML
2 INJECTION, SOLUTION INTRAMUSCULAR; INTRAVENOUS
Status: CANCELLED
Start: 2022-10-01

## 2022-08-19 RX ORDER — DIPHENHYDRAMINE HCL 25 MG
25 CAPSULE ORAL
Status: CANCELLED
Start: 2022-10-01

## 2022-08-19 RX ORDER — ONDANSETRON 8 MG/1
8 TABLET, FILM COATED ORAL
Status: CANCELLED
Start: 2022-10-01

## 2022-08-19 RX ADMIN — MORPHINE SULFATE 2 MG: 2 INJECTION, SOLUTION INTRAMUSCULAR; INTRAVENOUS at 14:54

## 2022-08-19 RX ADMIN — MORPHINE SULFATE 2 MG: 2 INJECTION, SOLUTION INTRAMUSCULAR; INTRAVENOUS at 12:54

## 2022-08-19 RX ADMIN — DEXTROSE AND SODIUM CHLORIDE 1000 ML: 5; 450 INJECTION, SOLUTION INTRAVENOUS at 12:53

## 2022-08-19 RX ADMIN — SODIUM CHLORIDE, PRESERVATIVE FREE 5 ML: 5 INJECTION INTRAVENOUS at 15:11

## 2022-08-19 RX ADMIN — MORPHINE SULFATE 2 MG: 2 INJECTION, SOLUTION INTRAMUSCULAR; INTRAVENOUS at 13:58

## 2022-08-19 NOTE — PROGRESS NOTES
Infusion Nursing Note:  Jennifer Cervantes presents today for add-on infusion.    Patient seen by provider today: No   present during visit today: Not Applicable.     Note: Patient received 1 L D5NaCl bolus over 2 hours, morphine x3 per generic infusion plan. Patient arrived with 9/10 pain all over back. Pain improved to 5/10 after interventions. Stable upon discharge.     Intravenous Access:  Implanted Port.     Treatment Conditions:  Results reviewed, labs MET treatment parameters, ok to proceed with treatment.     Post Infusion Assessment:  Patient tolerated infusion without incident.      Discharge Plan:   Patient and/or family verbalized understanding of discharge instructions and all questions answered.

## 2022-08-19 NOTE — TELEPHONE ENCOUNTER
BMT can offer 1300.  Jennifer confirmed she can come to the appointment.  Updated BMT charge nurse.  Requested SW assistance for arranging transportation.  Message sent to [facility] to ask them to schedule pt.     Routed to Patricia Mantilla CNP and Arely Krueger RNCC

## 2022-08-19 NOTE — LETTER
Date:August 19, 2022      Provider requested that no letter be sent. Do not send.       [facility]

## 2022-08-19 NOTE — PROGRESS NOTES
Social Work Note: Telephone Call  Oncology Clinic     Data/Intervention:  Patient Name:  Jennifer Cervnates DOB/Age: 2000      Call From: [facility]        Reason for Call:  Transportation      Assessment:   called [employer] ([phone]) to arrange ride through patient's insurance. [employer] arranged  for patient from home with [employer] ([phone]).  Patient will need to call when ready for return ride home ([phone]).      Plan:  Patient is aware of the transportation plan.  available to assist with any other needs.      CARLOS Chavez,UDAY  Hematology/Oncology Social Worker  Phone:[phone] Pager: [phone]

## 2022-08-19 NOTE — LETTER
8/19/2022         RE: Jennifer Cervantes  [address]  [place] [address]        Dear Colleague,    Thank you for referring your patient, Jennifer Cervantes, to the [facility] BLOOD AND MARROW TRANSPLANT PROGRAM [place]. Please see a copy of my visit note below.    Infusion Nursing Note:  Jennifer Cervantes presents today for add-on infusion.    Patient seen by provider today: No   present during visit today: Not Applicable.     Note: Patient received 1 L D5NaCl bolus over 2 hours, morphine x3 per generic infusion plan. Patient arrived with 9/10 pain all over back. Pain improved to 5/10 after interventions. Stable upon discharge.     Intravenous Access:  Implanted Port.     Treatment Conditions:  Results reviewed, labs MET treatment parameters, ok to proceed with treatment.     Post Infusion Assessment:  Patient tolerated infusion without incident.      Discharge Plan:   Patient and/or family verbalized understanding of discharge instructions and all questions answered.      Again, thank you for allowing me to participate in the care of your patient.        Sincerely,        [facility]

## 2022-08-19 NOTE — TELEPHONE ENCOUNTER
Pt called in to triage requesting IVF pain meds for lower back pain rated 10/10 x 6  days. Stated last took prn oxycodone at 0600 this morning without relief. Denied any fevers, chills, cough, sob, chest pain, numbness or tingling or other symptoms not typical of sickle cell pain.      Pt's last infusion was 8/15, last clinic visit 7/28/22 with follow-up on 8/29/22 with Dr. Duncan.   Patient states needs ride, 25 minutes away  Pt denied being out of home medications and needing any refills today.   Pt qualifies for sickle cell standing order protocol.  If you do not hear from the infusion center by 2pm then you will not be able to get in for an infusion today.     Added to infusion wait list.

## 2022-08-20 ENCOUNTER — APPOINTMENT (OUTPATIENT)
Dept: GENERAL RADIOLOGY | Facility: CLINIC | Age: 22
End: 2022-08-20
Attending: EMERGENCY MEDICINE
Payer: COMMERCIAL

## 2022-08-20 ENCOUNTER — HOSPITAL ENCOUNTER (OUTPATIENT)
Facility: CLINIC | Age: 22
Setting detail: OBSERVATION
Discharge: HOME | End: 2022-08-21
Attending: EMERGENCY MEDICINE | Admitting: EMERGENCY MEDICINE
Payer: COMMERCIAL

## 2022-08-20 VITALS
BODY MASS INDEX: 29.19 KG/M2 | SYSTOLIC BLOOD PRESSURE: 142 MMHG | TEMPERATURE: 98.5 F | DIASTOLIC BLOOD PRESSURE: 84 MMHG | HEART RATE: 122 BPM | HEIGHT: 64 IN | OXYGEN SATURATION: 92 % | WEIGHT: 171 LBS | RESPIRATION RATE: 20 BRPM

## 2022-08-20 DIAGNOSIS — R06.02 SOB (SHORTNESS OF BREATH): ICD-10-CM

## 2022-08-20 DIAGNOSIS — Z11.52 ENCOUNTER FOR SCREENING LABORATORY TESTING FOR SEVERE ACUTE RESPIRATORY SYNDROME CORONAVIRUS 2 (SARS-COV-2): ICD-10-CM

## 2022-08-20 DIAGNOSIS — D57.00 SICKLE CELL PAIN CRISIS (H): ICD-10-CM

## 2022-08-20 LAB
ANION GAP SERPL CALCULATED.3IONS-SCNC: 12 MMOL/L (ref 7–15)
ATRIAL RATE - MUSE: 112 BPM
BASOPHILS # BLD AUTO: 0.3 10E3/UL (ref 0–0.2)
BASOPHILS NFR BLD AUTO: 2 %
BUN SERPL-MCNC: 6.4 MG/DL (ref 6–20)
CALCIUM SERPL-MCNC: 9 MG/DL (ref 8.6–10)
CHLORIDE SERPL-SCNC: 104 MMOL/L (ref 98–107)
CREAT SERPL-MCNC: 0.51 MG/DL (ref 0.51–0.95)
D DIMER PPP FEU-MCNC: 3.41 UG/ML FEU (ref 0–0.5)
DEPRECATED HCO3 PLAS-SCNC: 21 MMOL/L (ref 22–29)
DIASTOLIC BLOOD PRESSURE - MUSE: NORMAL MMHG
EOSINOPHIL # BLD AUTO: 0.5 10E3/UL (ref 0–0.7)
EOSINOPHIL NFR BLD AUTO: 4 %
ERYTHROCYTE [DISTWIDTH] IN BLOOD BY AUTOMATED COUNT: 26.1 % (ref 10–15)
GFR SERPL CREATININE-BSD FRML MDRD: >90 ML/MIN/1.73M2
GLUCOSE SERPL-MCNC: 97 MG/DL (ref 70–99)
HCG SERPL QL: NEGATIVE
HCT VFR BLD AUTO: 20.9 % (ref 35–47)
HGB BLD-MCNC: 7.5 G/DL (ref 11.7–15.7)
HOLD SPECIMEN: NORMAL
IMM GRANULOCYTES # BLD: 0.1 10E3/UL
IMM GRANULOCYTES NFR BLD: 1 %
INTERPRETATION ECG - MUSE: NORMAL
LYMPHOCYTES # BLD AUTO: 2.6 10E3/UL (ref 0.8–5.3)
LYMPHOCYTES NFR BLD AUTO: 20 %
MCH RBC QN AUTO: 30.1 PG (ref 26.5–33)
MCHC RBC AUTO-ENTMCNC: 35.9 G/DL (ref 31.5–36.5)
MCV RBC AUTO: 84 FL (ref 78–100)
MONOCYTES # BLD AUTO: 1 10E3/UL (ref 0–1.3)
MONOCYTES NFR BLD AUTO: 7 %
NEUTROPHILS # BLD AUTO: 8.6 10E3/UL (ref 1.6–8.3)
NEUTROPHILS NFR BLD AUTO: 66 %
NRBC # BLD AUTO: 0.4 10E3/UL
NRBC BLD AUTO-RTO: 3 /100
P AXIS - MUSE: 63 DEGREES
PLATELET # BLD AUTO: 338 10E3/UL (ref 150–450)
POTASSIUM SERPL-SCNC: 3.5 MMOL/L (ref 3.4–5.3)
PR INTERVAL - MUSE: 146 MS
QRS DURATION - MUSE: 70 MS
QT - MUSE: 352 MS
QTC - MUSE: 480 MS
R AXIS - MUSE: 16 DEGREES
RBC # BLD AUTO: 2.49 10E6/UL (ref 3.8–5.2)
RETICS # AUTO: 0.43 10E6/UL (ref 0.03–0.1)
RETICS/RBC NFR AUTO: 18.2 % (ref 0.5–2)
SARS-COV-2 RNA RESP QL NAA+PROBE: NEGATIVE
SODIUM SERPL-SCNC: 137 MMOL/L (ref 136–145)
SYSTOLIC BLOOD PRESSURE - MUSE: NORMAL MMHG
T AXIS - MUSE: 13 DEGREES
TROPONIN T SERPL HS-MCNC: <6 NG/L
VENTRICULAR RATE- MUSE: 112 BPM
WBC # BLD AUTO: 13 10E3/UL (ref 4–11)

## 2022-08-20 PROCEDURE — 71045 X-RAY EXAM CHEST 1 VIEW: CPT | Mod: 26 | Performed by: RADIOLOGY

## 2022-08-20 PROCEDURE — 85025 COMPLETE CBC W/AUTO DIFF WBC: CPT | Performed by: EMERGENCY MEDICINE

## 2022-08-20 PROCEDURE — 84484 ASSAY OF TROPONIN QUANT: CPT | Performed by: EMERGENCY MEDICINE

## 2022-08-20 PROCEDURE — G0378 HOSPITAL OBSERVATION PER HR: HCPCS

## 2022-08-20 PROCEDURE — 96375 TX/PRO/DX INJ NEW DRUG ADDON: CPT

## 2022-08-20 PROCEDURE — 258N000003 HC RX IP 258 OP 636: Performed by: EMERGENCY MEDICINE

## 2022-08-20 PROCEDURE — 250N000013 HC RX MED GY IP 250 OP 250 PS 637: Performed by: EMERGENCY MEDICINE

## 2022-08-20 PROCEDURE — 80048 BASIC METABOLIC PNL TOTAL CA: CPT | Performed by: EMERGENCY MEDICINE

## 2022-08-20 PROCEDURE — 250N000011 HC RX IP 250 OP 636: Performed by: EMERGENCY MEDICINE

## 2022-08-20 PROCEDURE — 96372 THER/PROPH/DIAG INJ SC/IM: CPT | Performed by: EMERGENCY MEDICINE

## 2022-08-20 PROCEDURE — 96361 HYDRATE IV INFUSION ADD-ON: CPT

## 2022-08-20 PROCEDURE — 99285 EMERGENCY DEPT VISIT HI MDM: CPT | Mod: CS | Performed by: EMERGENCY MEDICINE

## 2022-08-20 PROCEDURE — 71045 X-RAY EXAM CHEST 1 VIEW: CPT

## 2022-08-20 PROCEDURE — 96376 TX/PRO/DX INJ SAME DRUG ADON: CPT

## 2022-08-20 PROCEDURE — 93010 ELECTROCARDIOGRAM REPORT: CPT | Performed by: EMERGENCY MEDICINE

## 2022-08-20 PROCEDURE — 85379 FIBRIN DEGRADATION QUANT: CPT | Performed by: EMERGENCY MEDICINE

## 2022-08-20 PROCEDURE — 85045 AUTOMATED RETICULOCYTE COUNT: CPT | Performed by: EMERGENCY MEDICINE

## 2022-08-20 PROCEDURE — 99285 EMERGENCY DEPT VISIT HI MDM: CPT | Mod: CS,25

## 2022-08-20 PROCEDURE — 93005 ELECTROCARDIOGRAM TRACING: CPT

## 2022-08-20 PROCEDURE — 250N000009 HC RX 250: Performed by: EMERGENCY MEDICINE

## 2022-08-20 PROCEDURE — U0005 INFEC AGEN DETEC AMPLI PROBE: HCPCS | Performed by: EMERGENCY MEDICINE

## 2022-08-20 PROCEDURE — 36415 COLL VENOUS BLD VENIPUNCTURE: CPT | Performed by: EMERGENCY MEDICINE

## 2022-08-20 PROCEDURE — 84703 CHORIONIC GONADOTROPIN ASSAY: CPT | Performed by: EMERGENCY MEDICINE

## 2022-08-20 PROCEDURE — 96374 THER/PROPH/DIAG INJ IV PUSH: CPT

## 2022-08-20 PROCEDURE — C9803 HOPD COVID-19 SPEC COLLECT: HCPCS

## 2022-08-20 RX ORDER — SODIUM CHLORIDE, SODIUM LACTATE, POTASSIUM CHLORIDE, CALCIUM CHLORIDE 600; 310; 30; 20 MG/100ML; MG/100ML; MG/100ML; MG/100ML
INJECTION, SOLUTION INTRAVENOUS ONCE
Status: COMPLETED | OUTPATIENT
Start: 2022-08-20 | End: 2022-08-20

## 2022-08-20 RX ORDER — ONDANSETRON 2 MG/ML
4 INJECTION INTRAMUSCULAR; INTRAVENOUS ONCE
Status: COMPLETED | OUTPATIENT
Start: 2022-08-20 | End: 2022-08-20

## 2022-08-20 RX ORDER — ENOXAPARIN SODIUM 100 MG/ML
1 INJECTION SUBCUTANEOUS ONCE
Status: COMPLETED | OUTPATIENT
Start: 2022-08-20 | End: 2022-08-20

## 2022-08-20 RX ORDER — ONDANSETRON 2 MG/ML
8 INJECTION INTRAMUSCULAR; INTRAVENOUS ONCE
Status: COMPLETED | OUTPATIENT
Start: 2022-08-20 | End: 2022-08-20

## 2022-08-20 RX ORDER — OXYCODONE HYDROCHLORIDE 5 MG/1
15-20 TABLET ORAL ONCE
Status: COMPLETED | OUTPATIENT
Start: 2022-08-20 | End: 2022-08-20

## 2022-08-20 RX ORDER — KETOROLAC TROMETHAMINE 15 MG/ML
15 INJECTION, SOLUTION INTRAMUSCULAR; INTRAVENOUS ONCE
Status: COMPLETED | OUTPATIENT
Start: 2022-08-20 | End: 2022-08-20

## 2022-08-20 RX ADMIN — ENOXAPARIN SODIUM 80 MG: 80 INJECTION SUBCUTANEOUS at 23:35

## 2022-08-20 RX ADMIN — SODIUM CHLORIDE, POTASSIUM CHLORIDE, SODIUM LACTATE AND CALCIUM CHLORIDE: 600; 310; 30; 20 INJECTION, SOLUTION INTRAVENOUS at 20:40

## 2022-08-20 RX ADMIN — ONDANSETRON 8 MG: 2 INJECTION INTRAMUSCULAR; INTRAVENOUS at 20:40

## 2022-08-20 RX ADMIN — OXYCODONE HYDROCHLORIDE 20 MG: 5 TABLET ORAL at 20:40

## 2022-08-20 RX ADMIN — Medication 4 MG: at 22:34

## 2022-08-20 RX ADMIN — Medication 4 MG: at 20:40

## 2022-08-20 RX ADMIN — ONDANSETRON 4 MG: 2 INJECTION INTRAMUSCULAR; INTRAVENOUS at 23:03

## 2022-08-20 RX ADMIN — KETOROLAC TROMETHAMINE 15 MG: 15 INJECTION, SOLUTION INTRAMUSCULAR; INTRAVENOUS at 20:40

## 2022-08-20 ASSESSMENT — ENCOUNTER SYMPTOMS
BACK PAIN: 1
SHORTNESS OF BREATH: 1
FEVER: 0
COUGH: 0

## 2022-08-20 ASSESSMENT — ACTIVITIES OF DAILY LIVING (ADL)
ADLS_ACUITY_SCORE: 35
ADLS_ACUITY_SCORE: 35

## 2022-08-21 ENCOUNTER — APPOINTMENT (OUTPATIENT)
Dept: NUCLEAR MEDICINE | Facility: CLINIC | Age: 22
End: 2022-08-21
Attending: EMERGENCY MEDICINE
Payer: COMMERCIAL

## 2022-08-21 PROCEDURE — A9540 TC99M MAA: HCPCS | Performed by: EMERGENCY MEDICINE

## 2022-08-21 PROCEDURE — 78582 LUNG VENTILAT&PERFUS IMAGING: CPT | Mod: 26 | Performed by: RADIOLOGY

## 2022-08-21 PROCEDURE — 78582 LUNG VENTILAT&PERFUS IMAGING: CPT

## 2022-08-21 PROCEDURE — A9567 TECHNETIUM TC-99M AEROSOL: HCPCS | Performed by: EMERGENCY MEDICINE

## 2022-08-21 PROCEDURE — 343N000001 HC RX 343: Performed by: EMERGENCY MEDICINE

## 2022-08-21 PROCEDURE — G0378 HOSPITAL OBSERVATION PER HR: HCPCS

## 2022-08-21 PROCEDURE — 250N000011 HC RX IP 250 OP 636: Performed by: EMERGENCY MEDICINE

## 2022-08-21 RX ORDER — HEPARIN SODIUM (PORCINE) LOCK FLUSH IV SOLN 100 UNIT/ML 100 UNIT/ML
5 SOLUTION INTRAVENOUS
Status: DISCONTINUED | OUTPATIENT
Start: 2022-08-21 | End: 2022-08-21 | Stop reason: HOSPADM

## 2022-08-21 RX ORDER — HEPARIN SODIUM,PORCINE 10 UNIT/ML
VIAL (ML) INTRAVENOUS
Status: DISCONTINUED
Start: 2022-08-21 | End: 2022-08-21 | Stop reason: HOSPADM

## 2022-08-21 RX ADMIN — KIT FOR THE PREPARATION OF TECHNETIUM TC 99M ALBUMIN AGGREGATED 6.4 MILLICURIE: 2.5 INJECTION, POWDER, FOR SOLUTION INTRAVENOUS at 00:29

## 2022-08-21 RX ADMIN — Medication 5 ML: at 01:45

## 2022-08-21 RX ADMIN — KIT FOR THE PREPARATION OF TECHNETIUM TC 99M PENTETATE 2 MILLICURIE: 20 INJECTION, POWDER, LYOPHILIZED, FOR SOLUTION INTRAVENOUS; RESPIRATORY (INHALATION) at 00:28

## 2022-08-21 ASSESSMENT — ACTIVITIES OF DAILY LIVING (ADL): ADLS_ACUITY_SCORE: 35

## 2022-08-21 NOTE — ED PROVIDER NOTES
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 Patient reports a sharp back pain and pain is slighlty different than normal. Patient states that the pain started two hours ago and tried heating pad at home but no improvements. Patient states that she is usually not on oxygen but her spO2 was 89 in triage so she was placed on the nasal cannula. Patient states her excision is usually on the low side and at highest spO2 is 94. Patient denies asthma exacerbation symptoms, chest pain, and cough.  Patient states she has been off of anticoagulation (with history of pulmonary embolism), at the recommendation of her hematologist for a few months.  She denies lower extremity swelling or pain or pain with inspiration.  She says her predominant symptom when she had a PE was shortness of breath and feeling like she could not get enough air.  She does not feel like that currently.  However when speaking with her mother listening in over the phone, the mother reports that she has been complaining of some shortness of breath recently and the mother felt like it seemed like her previous symptoms when she had a PE.  The patient says she is not currently short of breath but says she has been noticing she gets short of breath with walking longer distances.    Past Medical History  Past Medical History:    Diagnosis Date     Anxiety      Bleeding disorder (H)      Blood clotting disorder (H)      Cerebral infarction (H) 2015     Cognitive developmental delay     low IQ. Please recognize when managing pain and planning with her     Depressive disorder      Hemiplegia and hemiparesis following cerebral infarction affecting right dominant side (H)     right hand contractures     Iron overload due to repeated red blood cell transfusions      Migraines      Multiple subsegmental pulmonary emboli without acute cor pulmonale (H) 02/01/2021     Oppositional defiant behavior      Superficial venous thrombosis of arm, right 03/25/2021     Uncomplicated asthma      Past Surgical History:   Procedure Laterality Date     AS INSERT TUNNELED CV 2 CATH W/O PORT/PUMP       CHOLECYSTECTOMY       CV RIGHT HEART CATH MEASUREMENTS RECORDED N/A 11/18/2021    Procedure: Right Heart Cath;  Surgeon: Jackson Stauffer MD;  Location:  HEART CARDIAC CATH LAB     INSERT PORT VASCULAR ACCESS Left 4/21/2021    Procedure: INSERTION, VASCULAR ACCESS PORT (NOT SURE ON SIDE UNTIL REMOVAL);  Surgeon: Rajan More MD;  Location: [facility] OR     IR CHEST PORT PLACEMENT > 5 YRS OF AGE  4/21/2021     IR CVC NON TUNNEL LINE REMOVAL  5/6/2021     IR CVC NON TUNNEL PLACEMENT  04/07/2020     IR CVC NON TUNNEL PLACEMENT  4/30/2021     IR PORT REMOVAL LEFT  4/21/2021     REMOVE PORT VASCULAR ACCESS Left 4/21/2021    Procedure: REMOVAL, VASCULAR ACCESS PORT LEFT;  Surgeon: Rajan More MD;  Location: [facility] OR     REPAIR TENDON ELBOW Right 10/02/2019    Procedure: Right Elbow Flexor Lengthening, Flexor Pronator Slide Of Wrist and Finger, Thumb Adductor Lengthening;  Surgeon: Anai Franco MD;  Location: [facility] OR     TONSILLECTOMY Bilateral 10/02/2019    Procedure: Bilateral Tonsillectomy;  Surgeon: Farhana Guy MD;  Location: [facility] OR     ZC BREAST REDUCTION (INCLUDES LIPO) TIER 3 Bilateral 04/23/2019     oxyCODONE IR (ROXICODONE) 15 MG  "tablet  acetaminophen (TYLENOL) 325 MG tablet  albuterol (PROVENTIL) (2.5 MG/3ML) 0.083% neb solution  albuterol (PROVENTIL) (2.5 MG/3ML) 0.083% neb solution  aspirin (ASA) 81 MG chewable tablet  budesonide-formoterol (SYMBICORT) 160-4.5 MCG/ACT Inhaler  deferasirox (JADENU) 360 MG tablet  diphenhydrAMINE (BENADRYL) 25 MG capsule  EPINEPHrine (ANY BX GENERIC EQUIV) 0.3 MG/0.3ML injection 2-pack  Hydroxyurea 1000 MG TABS  ondansetron (ZOFRAN) 8 MG tablet      Allergies   Allergen Reactions     Contrast Dye      Hives and breathing issues     Fish-Derived Products Hives     Seafood Hives     Diagnostic X-Ray Materials      Gadolinium      Family History  Family History   Problem Relation Age of Onset     Sickle Cell Trait Mother      Hypertension Mother      Asthma Mother      Sickle Cell Trait Father      Glaucoma No family hx of      Macular Degeneration No family hx of      Social History   Social History     Tobacco Use     Smoking status: Never Smoker     Smokeless tobacco: Never Used   Substance Use Topics     Alcohol use: Not Currently     Alcohol/week: 0.0 standard drinks     Drug use: Never      Past medical history, past surgical history, medications, allergies, family history, and social history were reviewed with the patient. No additional pertinent items.       Review of Systems   Constitutional: Negative for fever.   Respiratory: Positive for shortness of breath. Negative for cough.    Cardiovascular: Negative for chest pain.   Musculoskeletal: Positive for back pain.   All other systems reviewed and are negative.    A complete review of systems was performed with pertinent positives and negatives noted in the HPI, and all other systems negative.    Physical Exam   BP: 136/79  Pulse: 117  Temp: 98.7  F (37.1  C)  Resp: 16  Height: 162.6 cm (5' 4\")  Weight: 77.6 kg (171 lb)  SpO2: (!) 89 %  Physical Exam  Vitals and nursing note reviewed.   Constitutional:       General: She is not in acute distress.     " Appearance: Normal appearance. She is well-developed. She is obese. She is not ill-appearing or diaphoretic.   HENT:      Head: Normocephalic and atraumatic.      Nose: Nose normal.      Mouth/Throat:      Mouth: Mucous membranes are moist.   Eyes:      General: No scleral icterus.     Conjunctiva/sclera: Conjunctivae normal.   Cardiovascular:      Rate and Rhythm: Normal rate and regular rhythm.   Pulmonary:      Effort: Pulmonary effort is normal. No respiratory distress.      Breath sounds: No stridor.   Abdominal:      General: There is no distension.      Palpations: Abdomen is soft.      Tenderness: There is no abdominal tenderness.   Musculoskeletal:         General: No deformity or signs of injury. Normal range of motion.      Cervical back: Normal range of motion and neck supple. No rigidity.      Right lower leg: No edema.      Left lower leg: No edema.   Skin:     General: Skin is warm and dry.      Coloration: Skin is not jaundiced or pale.      Findings: No erythema.   Neurological:      General: No focal deficit present.      Mental Status: She is alert and oriented to person, place, and time.   Psychiatric:         Mood and Affect: Mood normal.         Behavior: Behavior normal.         Thought Content: Thought content normal.         ED Course      Procedures        8:26 PM  The patient was seen and examined by Jackie Dale MD in Room ED25.        EKG Interpretation:      Interpreted by Jackie Dale MD  Time reviewed: 2054  Symptoms at time of EKG: Hypoxia  Rhythm: Sinus tachycardia   rate: 112  Axis: normal  Ectopy: none  Conduction: normal  ST Segments/ T Waves: No ST-T wave changes  Q Waves: none  Comparison to prior: Unchanged    Clinical Impression: normal EKG                    Results for orders placed or performed during the hospital encounter of 08/20/22   XR Chest Port 1 View     Status: None    Narrative    EXAM: XR CHEST PORT 1 VIEW  8/20/2022 8:42 PM     HISTORY:  low o2, sickle  cell       COMPARISON:  Chest x-ray 3/4/2022    TECHNIQUE: Portable AP semiupright view of the chest    FINDINGS:   Left chest wall Port-A-Cath with tip in the distal SVC.    The trachea is midline. The cardiomediastinal silhouette is within  normal limits. The pulmonary vasculature is distinct. No appreciable  pneumothorax or pleural effusion. No focal airspace opacity. No acute  osseous abnormality.      Impression    IMPRESSION: No focal airspace disease.    I have personally reviewed the examination and initial interpretation  and I agree with the findings.    KALI ROSARIO MD         SYSTEM ID:  [id number]   Lung vent and perf (NM)     Status: None (Preliminary result)    Impression    RESIDENT PRELIMINARY INTERPRETATION  Impression:  No evidence for pulmonary embolus.     Basic metabolic panel     Status: Abnormal   Result Value Ref Range    Creatinine 0.51 0.51 - 0.95 mg/dL    Sodium 137 136 - 145 mmol/L    Potassium 3.5 3.4 - 5.3 mmol/L    Urea Nitrogen 6.4 6.0 - 20.0 mg/dL    Chloride 104 98 - 107 mmol/L    Carbon Dioxide (CO2) 21 (L) 22 - 29 mmol/L    Anion Gap 12 7 - 15 mmol/L    Glucose 97 70 - 99 mg/dL    GFR Estimate >90 >60 mL/min/1.73m2    Calcium 9.0 8.6 - 10.0 mg/dL   Reticulocyte count     Status: Abnormal   Result Value Ref Range    % Reticulocyte 18.2 (H) 0.5 - 2.0 %    Absolute Reticulocyte 0.428 (H) 0.025 - 0.095 10e6/uL   D dimer quantitative     Status: Abnormal   Result Value Ref Range    D-Dimer Quantitative 3.41 (H) 0.00 - 0.50 ug/mL FEU    Narrative    This D-dimer assay is intended for use in conjunction with a clinical pretest probability assessment model to exclude pulmonary embolism (PE) and deep venous thrombosis (DVT) in outpatients suspected of PE or DVT. The cut-off value is 0.50 ug/mL FEU.   Troponin T, High Sensitivity     Status: Normal   Result Value Ref Range    Troponin T, High Sensitivity <6 <=14 ng/L   CBC with platelets and differential     Status: Abnormal   Result  Value Ref Range    WBC Count 13.0 (H) 4.0 - 11.0 10e3/uL    RBC Count 2.49 (L) 3.80 - 5.20 10e6/uL    Hemoglobin 7.5 (L) 11.7 - 15.7 g/dL    Hematocrit 20.9 (L) 35.0 - 47.0 %    MCV 84 78 - 100 fL    MCH 30.1 26.5 - 33.0 pg    MCHC 35.9 31.5 - 36.5 g/dL    RDW 26.1 (H) 10.0 - 15.0 %    Platelet Count 338 150 - 450 10e3/uL    % Neutrophils 66 %    % Lymphocytes 20 %    % Monocytes 7 %    % Eosinophils 4 %    % Basophils 2 %    % Immature Granulocytes 1 %    NRBCs per 100 WBC 3 (H) <1 /100    Absolute Neutrophils 8.6 (H) 1.6 - 8.3 10e3/uL    Absolute Lymphocytes 2.6 0.8 - 5.3 10e3/uL    Absolute Monocytes 1.0 0.0 - 1.3 10e3/uL    Absolute Eosinophils 0.5 0.0 - 0.7 10e3/uL    Absolute Basophils 0.3 (H) 0.0 - 0.2 10e3/uL    Absolute Immature Granulocytes 0.1 <=0.4 10e3/uL    Absolute NRBCs 0.4 10e3/uL   Extra Tube     Status: None    Narrative    The following orders were created for panel order Extra Tube.  Procedure                               Abnormality         Status                     ---------                               -----------         ------                     Extra Red Top Tube[[id number]]                               Final result                 Please view results for these tests on the individual orders.   Extra Red Top Tube     Status: None   Result Value Ref Range    Hold Specimen [facility]    Asymptomatic COVID-19 Virus (Coronavirus) by PCR Nasopharyngeal     Status: Normal    Specimen: Nasopharyngeal; Swab   Result Value Ref Range    SARS CoV2 PCR Negative Negative    Narrative    Testing was performed using the Xpert Xpress SARS-CoV-2 Assay on the  Cepheid Gene-Xpert Instrument Systems. Additional information about  this Emergency Use Authorization (EUA) assay can be found via the Lab  Guide. This test should be ordered for the detection of SARS-CoV-2 in  individuals who meet SARS-CoV-2 clinical and/or epidemiological  criteria. Test performance is unknown in asymptomatic patients. This  test is  for in vitro diagnostic use under the FDA EUA for  laboratories certified under CLIA to perform high complexity testing.  This test has not been FDA cleared or approved. A negative result  does not rule out the presence of PCR inhibitors in the specimen or  target RNA in concentration below the limit of detection for the  assay. The possibility of a false negative should be considered if  the patient's recent exposure or clinical presentation suggests  COVID-19. This test was validated by the [facility]. This laboratory is certified under  the Clinical Laboratory Improvement Amendments of 1988 (CLIA-88) as  qualified to perform high complexity laboratory testing.     HCG qualitative Blood     Status: Normal   Result Value Ref Range    hCG Serum Qualitative Negative Negative   EKG 12-lead, tracing only     Status: None   Result Value Ref Range    Systolic Blood Pressure  mmHg    Diastolic Blood Pressure  mmHg    Ventricular Rate 112 BPM    Atrial Rate 112 BPM    WA Interval 146 ms    QRS Duration 70 ms     ms    QTc 480 ms    P Axis 63 degrees    R AXIS 16 degrees    T Axis 13 degrees    Interpretation ECG       Sinus tachycardia  Otherwise normal ECG  Unconfirmed report - interpretation of this ECG is computer generated - see medical record for final interpretation    Confirmed by - EMERGENCY ROOM, PHYSICIAN ([id number]),  ZEFERINO CARVER ([id number]) on 8/20/2022 10:07:41 PM     CBC with platelets differential     Status: Abnormal    Narrative    The following orders were created for panel order CBC with platelets differential.  Procedure                               Abnormality         Status                     ---------                               -----------         ------                     CBC with platelets and d...[[id number]]  Abnormal            Final result                 Please view results for these tests on the individual orders.      Medications   pharmacy alert - intermittent dosing (has no administration in time range)   heparin 100 UNIT/ML injection 5 mL (5 mLs Intracatheter Given 8/21/22 0145)   heparin lock flush 10 UNIT/ML injection (  Not Given 8/21/22 0141)   oxyCODONE (ROXICODONE) tablet 15-20 mg (20 mg Oral Given 8/20/22 2040)   ketamine (KETALAR) injection 4 mg (4 mg Intravenous Given 8/20/22 2040)   ketorolac (TORADOL) injection 15 mg (15 mg Intravenous Given 8/20/22 2040)   lactated ringers infusion ( Intravenous Stopped 8/20/22 2237)   ondansetron (ZOFRAN) injection 8 mg (8 mg Intravenous Given 8/20/22 2040)   enoxaparin ANTICOAGULANT (LOVENOX) injection 80 mg (80 mg Subcutaneous Given 8/20/22 2335)   ketamine (KETALAR) injection 4 mg (4 mg Intravenous Given 8/20/22 2234)   ondansetron (ZOFRAN) injection 4 mg (4 mg Intravenous Given 8/20/22 2303)   technetium pertechnetate with albumin (Tc99m MAA) radioisotope injection 2-7.2 millicurie (6.4 millicuries Intravenous Given 8/21/22 0029)   technetium pentetate Tc99m (DTPA) inhaled radioisotope 65 millicurie (2 millicuries Inhalation Given 8/21/22 0028)        Assessments & Plan (with Medical Decision Making)   Jennifer Cervantes is a 22 year old female with a past medical history significant for 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     Ddx: Sickle cell pain, pulmonary embolism, asthma exacerbation, acute chest, atelectasis or hypoventilation secondary to back pain    Followed patient's ED pain plan with oxycodone, Toradol, ketamine, IV fluids, Zofran.  Patient was satting 89% on room air on arrival.  This resolved with 2 L oxygen  via nasal cannula.  However she was mildly tachycardic in the emergency department.  She denies shortness of breath or the feeling that she was having an asthma exacerbation.  No recent illness or cough/fever.  She denies chest pain.  However, I reviewed patient's normal presenting oxygen saturations and it does not look like she is frequently as low as 89%.  She is high risk for pulmonary embolism since she has a history of this and is not currently on anticoagulation.  Labs notable for D-dimer of 3.41.  EKG with sinus tachycardia without acute ischemic changes. Hemoglobin 7.5 which is essentially consistent with patient's baseline.  White count 13 which is also patient's baseline. chest  X-ray clear.  Kidney function normal.     Patient has a contrast allergy and in speaking with her she has never been pretreated for a contrast study.  It looks like she usually gets V/Q scans.  I do not think this will be done until the morning so we will admit her on observation and treat her empirically for pulmonary embolism.  Patient does not have any significant risk for hemorrhage complication so we will give her a one-time dose of therapeutic Lovenox at 1 mg/kg.      COVID-negative.  VQ scan without evidence of pulmonary embolus.  Patient informed of the results.  She was informed that the Lovenox she was given along last for 12 hours but that she does not require ongoing anticoagulation since her VQ scan showed no pulmonary embolus.  She was advised to follow-up with her primary care provider if she has ongoing shortness of breath for evaluation of potential asthma exacerbation.  Patient was eager to be discharged home with improvement of her sickle cell pain after following her pain plan.  Detailed return precautions provided.        I have reviewed the nursing notes. I have reviewed the findings, diagnosis, plan and need for follow up with the patient.    New Prescriptions    No medications on file       Final diagnoses:    SOB (shortness of breath)   Sickle cell pain crisis (H)     I, Rossy Hebert, am serving as a trained medical scribe to document services personally performed by Jackie Dale MD, based on the provider's statements to me.      I, Jackie Dale MD, was physically present and have reviewed and verified the accuracy of this note documented by Rossy Hebert.  --  Jackie Dale MD  [facility] EMERGENCY DEPARTMENT  8/20/2022     Jackie Dale MD  08/21/22 0150

## 2022-08-21 NOTE — ED TRIAGE NOTES
Triage Assessment     Row Name 08/20/22 2014       Triage Assessment (Adult)    Airway WDL WDL       Respiratory WDL    Respiratory WDL WDL       Skin Circulation/Temperature WDL    Skin Circulation/Temperature WDL WDL       Cardiac WDL    Cardiac WDL WDL       Peripheral/Neurovascular WDL    Peripheral Neurovascular WDL WDL       Cognitive/Neuro/Behavioral WDL    Cognitive/Neuro/Behavioral WDL WDL

## 2022-08-21 NOTE — DISCHARGE INSTRUCTIONS
Please make an appointment to follow up with Hematology Oncology Clinic (phone: [phone]) in 2-3 days as needed.    Return to the Emergency Department if you develop chest pain, shortness of breath, fever, coughing up blood, or fainting.

## 2022-08-22 ENCOUNTER — INFUSION THERAPY VISIT (OUTPATIENT)
Dept: INFUSION THERAPY | Facility: CLINIC | Age: 22
End: 2022-08-22
Attending: PEDIATRICS
Payer: COMMERCIAL

## 2022-08-22 ENCOUNTER — TELEPHONE (OUTPATIENT)
Dept: ONCOLOGY | Facility: CLINIC | Age: 22
End: 2022-08-22

## 2022-08-22 ENCOUNTER — PATIENT OUTREACH (OUTPATIENT)
Dept: CARE COORDINATION | Facility: CLINIC | Age: 22
End: 2022-08-22

## 2022-08-22 ENCOUNTER — PATIENT OUTREACH (OUTPATIENT)
Dept: ONCOLOGY | Facility: CLINIC | Age: 22
End: 2022-08-22

## 2022-08-22 VITALS
TEMPERATURE: 98 F | HEART RATE: 110 BPM | OXYGEN SATURATION: 88 % | RESPIRATION RATE: 16 BRPM | SYSTOLIC BLOOD PRESSURE: 143 MMHG | DIASTOLIC BLOOD PRESSURE: 79 MMHG

## 2022-08-22 DIAGNOSIS — G81.10 SPASTIC HEMIPLEGIA, UNSPECIFIED ETIOLOGY, UNSPECIFIED LATERALITY (H): Primary | ICD-10-CM

## 2022-08-22 DIAGNOSIS — D57.00 SICKLE CELL PAIN CRISIS (H): ICD-10-CM

## 2022-08-22 PROCEDURE — 96366 THER/PROPH/DIAG IV INF ADDON: CPT

## 2022-08-22 PROCEDURE — 96365 THER/PROPH/DIAG IV INF INIT: CPT

## 2022-08-22 PROCEDURE — 258N000003 HC RX IP 258 OP 636: Performed by: PEDIATRICS

## 2022-08-22 PROCEDURE — 96375 TX/PRO/DX INJ NEW DRUG ADDON: CPT

## 2022-08-22 PROCEDURE — 96376 TX/PRO/DX INJ SAME DRUG ADON: CPT

## 2022-08-22 PROCEDURE — 250N000011 HC RX IP 250 OP 636: Performed by: PEDIATRICS

## 2022-08-22 RX ORDER — HEPARIN SODIUM (PORCINE) LOCK FLUSH IV SOLN 100 UNIT/ML 100 UNIT/ML
5 SOLUTION INTRAVENOUS
Status: DISCONTINUED | OUTPATIENT
Start: 2022-08-22 | End: 2022-08-22 | Stop reason: HOSPADM

## 2022-08-22 RX ORDER — ONDANSETRON 8 MG/1
8 TABLET, FILM COATED ORAL
Status: CANCELLED
Start: 2022-10-01

## 2022-08-22 RX ORDER — HEPARIN SODIUM,PORCINE 10 UNIT/ML
5 VIAL (ML) INTRAVENOUS
Status: CANCELLED | OUTPATIENT
Start: 2022-10-01

## 2022-08-22 RX ORDER — DIPHENHYDRAMINE HCL 25 MG
25 CAPSULE ORAL
Status: CANCELLED
Start: 2022-10-01

## 2022-08-22 RX ORDER — MORPHINE SULFATE 2 MG/ML
2 INJECTION, SOLUTION INTRAMUSCULAR; INTRAVENOUS
Status: CANCELLED
Start: 2022-10-01

## 2022-08-22 RX ORDER — MORPHINE SULFATE 2 MG/ML
2 INJECTION, SOLUTION INTRAMUSCULAR; INTRAVENOUS
Status: DISCONTINUED | OUTPATIENT
Start: 2022-08-22 | End: 2022-08-22 | Stop reason: HOSPADM

## 2022-08-22 RX ORDER — HEPARIN SODIUM (PORCINE) LOCK FLUSH IV SOLN 100 UNIT/ML 100 UNIT/ML
5 SOLUTION INTRAVENOUS
Status: CANCELLED | OUTPATIENT
Start: 2022-10-01

## 2022-08-22 RX ADMIN — Medication 5 ML: at 16:29

## 2022-08-22 RX ADMIN — MORPHINE SULFATE 2 MG: 2 INJECTION, SOLUTION INTRAMUSCULAR; INTRAVENOUS at 16:13

## 2022-08-22 RX ADMIN — DEXTROSE AND SODIUM CHLORIDE 1000 ML: 5; 450 INJECTION, SOLUTION INTRAVENOUS at 14:14

## 2022-08-22 RX ADMIN — MORPHINE SULFATE 2 MG: 2 INJECTION, SOLUTION INTRAMUSCULAR; INTRAVENOUS at 14:15

## 2022-08-22 RX ADMIN — MORPHINE SULFATE 2 MG: 2 INJECTION, SOLUTION INTRAMUSCULAR; INTRAVENOUS at 15:14

## 2022-08-22 ASSESSMENT — PAIN SCALES - GENERAL: PAINLEVEL: EXTREME PAIN (8)

## 2022-08-22 NOTE — LETTER
8/22/2022         RE: Jennifer Cervantes  [address]  [place] [facility] [address]        Dear Colleague,    Thank you for referring your patient, Jennifer Cervantes, to the [facility]. Please see a copy of my visit note below.    Infusion Nursing Note:  Jennifer Cervantes presents today for   Chief Complaint   Patient presents with     Infusion     IV fluids - dextrose 5% and 0.45% NaCl BOLUS, pain medication       Patient seen by provider today: No   present during visit today: Not Applicable.    Note:   -1000ml D5 1/2NS infused over 2hrs.  -Morphine given IVP x3.  Pain on arrival 8/10, localized to back.  Pain at discharge 4/10.    Intravenous Access:  Implanted Port.    Treatment Conditions:  Not Applicable.    Post Infusion Assessment:  Patient tolerated infusion without incident.  Blood return noted pre and post infusion.  Site patent and intact, free from redness, edema or discomfort.  No evidence of extravasations.  Access discontinued per protocol.     Discharge Plan:   Discharge instructions reviewed with: Patient.  Patient and/or family verbalized understanding of discharge instructions and all questions answered.  Patient calls for appointments as needed.   Patient discharged in stable condition accompanied by: self.  Departure Mode: Ambulatory.      Roz Kaur RN    /79 (BP Location: Left arm, Patient Position: Fowlers, Cuff Size: Adult Regular)   Pulse 106   Temp 98  F (36.7  C)   Resp 16   SpO2 (!) 88%     Administrations This Visit     dextrose 5% and 0.45% NaCl BOLUS     Admin Date  08/22/2022 Action  New Bag Dose  1,000 mL Rate  500 mL/hr Route  Intravenous Administered By  Roz Kaur RN          heparin 100 UNIT/ML injection 5 mL     Admin Date  08/22/2022 Action  Given Dose  5 mL Route  Intracatheter Administered By  Tati Iglesias RN          morphine (PF) injection 2 mg     Admin Date  08/22/2022 Action  Given Dose  2 mg  Route  Intravenous Administered By  Roz Kaur, JOE           Admin Date  08/22/2022 Action  Given Dose  2 mg Route  Intravenous Administered By  Roz Kaur RN           Admin Date  08/22/2022 Action  Given Dose  2 mg Route  Intravenous Administered By  Roz Kaur, JOE                                Again, thank you for allowing me to participate in the care of your patient.        Sincerely,        [facility]

## 2022-08-22 NOTE — PATIENT INSTRUCTIONS
Dear Jennifer Cervantes    Thank you for choosing [facility] ([facility]) for your infusion.  The following information is a summary of our appointment as well as important reminders.      We look forward in seeing you on your next appointment here at [facility] ([facility]).  Please don t hesitate to call us at [phone] to reschedule any of your appointments or to speak with one of the [facility] registered nurses.  It was a pleasure taking care of you today.    Sincerely,    [facility]  [address] [place] [address]  [address]  Phone:  [phone]

## 2022-08-22 NOTE — PROGRESS NOTES
Social Work Note: Telephone Call  Oncology Clinic     Data/Intervention:  Patient Name:  Jennifer Cervantes  /Age: 2000, 22 years old     Call From: [facility]        Reason for Call:  Transportation     Assessment:   called "[employer] to arrange ride through patient's insurance. "[employer] arranged  for patient from home with [employer] ([phone]).  Patient will need to call when ready for return ride home ([phone]).      Plan:  Patient is aware of the transportation plan.  available to assist with any other needs.      CARLOS Chavez,LGSW  Hematology/Oncology Social Worker  Phone:[phone] Pager: [phone]

## 2022-08-22 NOTE — TELEPHONE ENCOUNTER
[facility] has opening at 2 pm.     Call placed to Jennifer to let her know her appointment was at 2 pm she confirms she can come to appointment.     Message sent to social work to assist in arranging transportation     Message sent to [facility]  to schedule appointment

## 2022-08-22 NOTE — PROGRESS NOTES
Infusion Nursing Note:  Jennifer Cervantes presents today for   Chief Complaint   Patient presents with     Infusion     IV fluids - dextrose 5% and 0.45% NaCl BOLUS, pain medication       Patient seen by provider today: No   present during visit today: Not Applicable.    Note:   -1000ml D5 1/2NS infused over 2hrs.  -Morphine given IVP x3.  Pain on arrival 8/10, localized to back.  Pain at discharge 4/10.    Intravenous Access:  Implanted Port.    Treatment Conditions:  Not Applicable.    Post Infusion Assessment:  Patient tolerated infusion without incident.  Blood return noted pre and post infusion.  Site patent and intact, free from redness, edema or discomfort.  No evidence of extravasations.  Access discontinued per protocol.     Discharge Plan:   Discharge instructions reviewed with: Patient.  Patient and/or family verbalized understanding of discharge instructions and all questions answered.  Patient calls for appointments as needed.   Patient discharged in stable condition accompanied by: self.  Departure Mode: Ambulatory.      Roz Kaur RN    /79 (BP Location: Left arm, Patient Position: Fowlers, Cuff Size: Adult Regular)   Pulse 106   Temp 98  F (36.7  C)   Resp 16   SpO2 (!) 88%     Administrations This Visit     dextrose 5% and 0.45% NaCl BOLUS     Admin Date  08/22/2022 Action  New Bag Dose  1,000 mL Rate  500 mL/hr Route  Intravenous Administered By  Roz Kaur RN          heparin 100 UNIT/ML injection 5 mL     Admin Date  08/22/2022 Action  Given Dose  5 mL Route  Intracatheter Administered By  Tati Iglesias RN          morphine (PF) injection 2 mg     Admin Date  08/22/2022 Action  Given Dose  2 mg Route  Intravenous Administered By  Roz Kaur RN           Admin Date  08/22/2022 Action  Given Dose  2 mg Route  Intravenous Administered By  Roz Kaur RN           Admin Date  08/22/2022 Action  Given Dose  2 mg Route  Intravenous Administered By  Roz Kaur, JOE

## 2022-08-22 NOTE — TELEPHONE ENCOUNTER
Pt called in to triage requesting IVF pain meds for back  pain rated 8/10 x 1-2 days. Stated last took prn oxycodone at 6 am, this morning without relief. Denied any fevers, chills, cough, sob, chest pain, numbness or tingling or other symptoms not typical of sickle cell pain.   Pt's last infusion was 8/19/22, last clinic visit 7/28/22 with follow-up on 8/29/22 with Dr Duncan.   Patient states they do not have own ride and it will take 60 minutes long to get to cancer clinic.   Pt denied being out of home medications and needing any refills today.   Pt qualifies for sickle cell standing order protocol.  If you do not hear from the infusion center by 2pm then you will not be able to get in for an infusion today.   Please note, if you are late for your appt, you risk losing your infusion appt as it may delay another patient's infusion who arrived on time.

## 2022-08-22 NOTE — PROGRESS NOTES
Patient discharged on 8/20/22, please follow up per TCM workflow.    Shravan Flores on 8/22/2022 at 8:32 AM

## 2022-08-25 ENCOUNTER — NURSE TRIAGE (OUTPATIENT)
Dept: ONCOLOGY | Facility: CLINIC | Age: 22
End: 2022-08-25

## 2022-08-25 ENCOUNTER — PATIENT OUTREACH (OUTPATIENT)
Dept: CARE COORDINATION | Facility: CLINIC | Age: 22
End: 2022-08-25

## 2022-08-25 ENCOUNTER — INFUSION THERAPY VISIT (OUTPATIENT)
Dept: INFUSION THERAPY | Facility: CLINIC | Age: 22
End: 2022-08-25
Attending: PEDIATRICS
Payer: COMMERCIAL

## 2022-08-25 VITALS
SYSTOLIC BLOOD PRESSURE: 146 MMHG | TEMPERATURE: 98.5 F | OXYGEN SATURATION: 91 % | DIASTOLIC BLOOD PRESSURE: 85 MMHG | HEART RATE: 116 BPM | RESPIRATION RATE: 18 BRPM

## 2022-08-25 DIAGNOSIS — I82.611 SUPERFICIAL VENOUS THROMBOSIS OF ARM, RIGHT: ICD-10-CM

## 2022-08-25 DIAGNOSIS — D57.1 HB-SS DISEASE WITHOUT CRISIS (H): ICD-10-CM

## 2022-08-25 DIAGNOSIS — G81.10 SPASTIC HEMIPLEGIA, UNSPECIFIED ETIOLOGY, UNSPECIFIED LATERALITY (H): Primary | ICD-10-CM

## 2022-08-25 DIAGNOSIS — D57.00 SICKLE CELL PAIN CRISIS (H): ICD-10-CM

## 2022-08-25 PROCEDURE — 96376 TX/PRO/DX INJ SAME DRUG ADON: CPT

## 2022-08-25 PROCEDURE — 258N000003 HC RX IP 258 OP 636: Performed by: PEDIATRICS

## 2022-08-25 PROCEDURE — 96361 HYDRATE IV INFUSION ADD-ON: CPT

## 2022-08-25 PROCEDURE — 250N000011 HC RX IP 250 OP 636: Performed by: PEDIATRICS

## 2022-08-25 PROCEDURE — 96374 THER/PROPH/DIAG INJ IV PUSH: CPT

## 2022-08-25 RX ORDER — HEPARIN SODIUM,PORCINE 10 UNIT/ML
5 VIAL (ML) INTRAVENOUS
Status: CANCELLED | OUTPATIENT
Start: 2022-10-01

## 2022-08-25 RX ORDER — HEPARIN SODIUM (PORCINE) LOCK FLUSH IV SOLN 100 UNIT/ML 100 UNIT/ML
5 SOLUTION INTRAVENOUS
Status: CANCELLED | OUTPATIENT
Start: 2022-10-01

## 2022-08-25 RX ORDER — MORPHINE SULFATE 2 MG/ML
2 INJECTION, SOLUTION INTRAMUSCULAR; INTRAVENOUS
Status: CANCELLED
Start: 2022-10-01

## 2022-08-25 RX ORDER — MORPHINE SULFATE 2 MG/ML
2 INJECTION, SOLUTION INTRAMUSCULAR; INTRAVENOUS
Status: DISCONTINUED | OUTPATIENT
Start: 2022-08-25 | End: 2022-08-25 | Stop reason: HOSPADM

## 2022-08-25 RX ORDER — DIPHENHYDRAMINE HCL 25 MG
25 CAPSULE ORAL
Status: CANCELLED
Start: 2022-10-01

## 2022-08-25 RX ORDER — OXYCODONE HYDROCHLORIDE 15 MG/1
15 TABLET ORAL EVERY 4 HOURS PRN
Qty: 45 TABLET | Refills: 0 | Status: SHIPPED | OUTPATIENT
Start: 2022-08-25 | End: 2022-08-29

## 2022-08-25 RX ORDER — HEPARIN SODIUM (PORCINE) LOCK FLUSH IV SOLN 100 UNIT/ML 100 UNIT/ML
5 SOLUTION INTRAVENOUS
Status: DISCONTINUED | OUTPATIENT
Start: 2022-08-25 | End: 2022-08-25 | Stop reason: HOSPADM

## 2022-08-25 RX ORDER — ONDANSETRON 8 MG/1
8 TABLET, FILM COATED ORAL
Status: CANCELLED
Start: 2022-10-01

## 2022-08-25 RX ADMIN — MORPHINE SULFATE 2 MG: 2 INJECTION, SOLUTION INTRAMUSCULAR; INTRAVENOUS at 13:25

## 2022-08-25 RX ADMIN — MORPHINE SULFATE 2 MG: 2 INJECTION, SOLUTION INTRAMUSCULAR; INTRAVENOUS at 15:24

## 2022-08-25 RX ADMIN — DEXTROSE AND SODIUM CHLORIDE 1000 ML: 5; 450 INJECTION, SOLUTION INTRAVENOUS at 13:21

## 2022-08-25 RX ADMIN — Medication 5 ML: at 15:27

## 2022-08-25 RX ADMIN — MORPHINE SULFATE 2 MG: 2 INJECTION, SOLUTION INTRAMUSCULAR; INTRAVENOUS at 14:26

## 2022-08-25 ASSESSMENT — PAIN SCALES - GENERAL: PAINLEVEL: EXTREME PAIN (8)

## 2022-08-25 NOTE — PROGRESS NOTES
Social Work Note: Telephone Call  Oncology Clinic     Data/Intervention:  Patient Name:  Jennifer Cervantes  /Age: 2000, 22 years old     Call From: [facility]        Reason for Call:  Transportation     Assessment:   called [employer] ([phone])  to arrange will call ride home after their appointment. Patient will need to call when ready for return ride home ([phone]).      Plan:  Patient is aware of the transportation plan.  available to assist with any other needs.      CARLOS Chavez,SW  Hematology/Oncology Social Worker  Phone:[phone] Pager: [phone]

## 2022-08-25 NOTE — PROGRESS NOTES
Infusion Nursing Note:  Jennifer Cervantes presents today for IVF and analgesia.    Patient seen by provider today: No   present during visit today: Not Applicable.    Note:   D5 1/2 NS 1L infused over 2 hours.  Morphine given x3 doses as ordered.    Intravenous Access:  Implanted Port.    Treatment Conditions:  Not Applicable.    Administrations This Visit     dextrose 5% and 0.45% NaCl BOLUS     Admin Date  08/25/2022 Action  New Bag Dose  1,000 mL Rate  500 mL/hr Route  Intravenous Administered By  Eulalia Rashid RN          heparin 100 UNIT/ML injection 5 mL     Admin Date  08/25/2022 Action  Given Dose  5 mL Route  Intracatheter Administered By  Eulalia Rashid RN          morphine (PF) injection 2 mg     Admin Date  08/25/2022 Action  Given Dose  2 mg Route  Intravenous Administered By  Eulalia Rashid RN           Admin Date  08/25/2022 Action  Given Dose  2 mg Route  Intravenous Administered By  Eulalia Rashid RN           Admin Date  08/25/2022 Action  Given Dose  2 mg Route  Intravenous Administered By  Eulalia Rashid RN              BP (!) 146/85   Pulse 116   Temp 98.5  F (36.9  C) (Oral)   Resp 18   SpO2 91%     Post Infusion Assessment:  Patient tolerated infusion without incident.  Site patent and intact, free from redness, edema or discomfort.  No evidence of extravasations.  Access discontinued per protocol.     Discharge Plan:   Discharge instructions reviewed with: Patient.  Patient and/or family verbalized understanding of discharge instructions and all questions answered.  Copy of AVS reviewed with patient and/or family.  Patient discharged in stable condition accompanied by: self.  Departure Mode: Ambulatory.    Eulalia Rashid RN

## 2022-08-25 NOTE — TELEPHONE ENCOUNTER
Back pain - 1 day    Pain meds - 6am, oxycodone, shower heating pad, warm blanket     Last Monday   Oncology Nurse Triage - Sickle Cell Pain Crisis:    Treating Provider:   Dr. Perla Sr    Date of last office visit: 07/28/2022    Did they no show to last appointment: No     Date of last infusion: 08/22    Duration of symptoms: 1 day    Does pain feel like patients typical sickle cell pain? Yes     Have home medications been tried:  Yes    Last home medication taken and when: 6am    Number of doses of home medications have been attempted and when was first dose taken:  1    Other home therapies attempted: HEAT/HEATING PAD, warm shower    Is patient out of home medications: No     Has patient been seen in ED or infusion in the last 3 days? (if yes, when): No    Does patient have active treatment plan?  Yes      Symptom assessment:    Fever (if yes max temperature recorded in last 24 hours):  No    Chest Pain Present (if yes when?): No       Shortness of breath: No     GI symptoms:   None    Urinary symptoms:   None    Oral intake:   Regular diet, tolerating without issues.     Additional information (if necessary):     Does patient have transportation: No       Recommendations:  Approved per protocol for IV fluids and pain meds.

## 2022-08-25 NOTE — PATIENT INSTRUCTIONS
Dear Jennifer Cervantes    Thank you for choosing [facility] for your infusion.  The following information is a summary of our appointment as well as important reminders.      We look forward in seeing you on your next appointment here at [facility]  Please don t hesitate to call us at [phone] to reschedule any of your appointments or to speak with one of the [facility] registered nurses.  It was a pleasure taking care of you today.    Sincerely,    [facility]  [facility]  [address] [place] [id number]  Phone:  [phone]

## 2022-08-25 NOTE — TELEPHONE ENCOUNTER
[facility] had cancellation for 1300.  Jennifer confirmed she can come if transportation can be arranged.   Per SW will be difficult to arrange transportation within this timeframe.  Confirmed with [facility] charge nurse that apt time is firm; they cannot accommodate after 1300.  Called Jennifer to inform that we are unable to arrange transportation in time for her to be at apt at 1300.  If pt sx worsen, she should go to ED or call in tomorrow to get on wait list.  Pt states she can get an uber and be here at 1300.  Reminded pt that apt time is firm.  Pt is aware and confirms she will be here at 1300.  Updated [facility] charge, SW and message sent to [facility] to schedule.     Routed to Patricia Mantilla CNP and MAURISIO Asencio

## 2022-08-25 NOTE — LETTER
8/25/2022         RE: Jennifer Cervantes  [address]  [place] [address]        Dear Colleague,    Thank you for referring your patient, Jennifer Cervantes, to the [facility]. Please see a copy of my visit note below.    Infusion Nursing Note:  Jennifer Cervantes presents today for IVF and analgesia.    Patient seen by provider today: No   present during visit today: Not Applicable.    Note:   D5 1/2 NS 1L infused over 2 hours.  Morphine given x3 doses as ordered.    Intravenous Access:  Implanted Port.    Treatment Conditions:  Not Applicable.    Administrations This Visit     dextrose 5% and 0.45% NaCl BOLUS     Admin Date  08/25/2022 Action  New Bag Dose  1,000 mL Rate  500 mL/hr Route  Intravenous Administered By  Eulalia Rashid RN          heparin 100 UNIT/ML injection 5 mL     Admin Date  08/25/2022 Action  Given Dose  5 mL Route  Intracatheter Administered By  Eulalia Rashid RN          morphine (PF) injection 2 mg     Admin Date  08/25/2022 Action  Given Dose  2 mg Route  Intravenous Administered By  Eulalia Rashid RN           Admin Date  08/25/2022 Action  Given Dose  2 mg Route  Intravenous Administered By  Eulalia Rashid RN           Admin Date  08/25/2022 Action  Given Dose  2 mg Route  Intravenous Administered By  Eulalia Rashid, JOE              BP (!) 146/85   Pulse 116   Temp 98.5  F (36.9  C) (Oral)   Resp 18   SpO2 91%     Post Infusion Assessment:  Patient tolerated infusion without incident.  Site patent and intact, free from redness, edema or discomfort.  No evidence of extravasations.  Access discontinued per protocol.     Discharge Plan:   Discharge instructions reviewed with: Patient.  Patient and/or family verbalized understanding of discharge instructions and all questions answered.  Copy of AVS reviewed with patient and/or family.  Patient discharged in stable condition accompanied by: self.  Departure Mode:  Ambulatory.    Eulalia Rashid RN                        Again, thank you for allowing me to participate in the care of your patient.        Sincerely,        [facility]

## 2022-08-27 ENCOUNTER — NURSE TRIAGE (OUTPATIENT)
Dept: NURSING | Facility: CLINIC | Age: 22
End: 2022-08-27

## 2022-08-27 NOTE — TELEPHONE ENCOUNTER
S:  Pt calling wanted to know if she needs a blood transfusion  B: HX: sickle cell disease And asthma   Took all asthma meds today  A: O2 sats 92% on RA (Usually 92-94% on RA per pt.)Only SOB on exertion.  Denies pain denies all symptoms.   R: Mother was taking her to ER now.     Vika Aggarwal RN on 8/27/2022 at 3:49 PM        Reason for Disposition    [1] MILD difficulty breathing (e.g., minimal/no SOB at rest, SOB with walking, pulse <100) AND [2] NEW-onset or WORSE than normal    Additional Information    Negative: Bluish (or gray) lips or face now    Negative: Choking on something    Negative: [1] Breathing stopped AND [2] hasn't returned    Negative: SEVERE difficulty breathing (e.g., struggling for each breath, speaks in single words)    Negative: Difficult to awaken or acting confused (e.g., disoriented, slurred speech)    Negative: Passed out (i.e., lost consciousness, collapsed and was not responding)    Negative: Wheezing started suddenly after medicine, an allergic food or bee sting    Negative: Stridor    Negative: Slow, shallow and weak breathing    Negative: Sounds like a life-threatening emergency to the triager    Negative: Chest pain    Negative: [1] Wheezing (high pitched whistling sound) AND [2] previous asthma attacks or use of asthma medicines    Negative: [1] Difficulty breathing AND [2] only present when coughing    Negative: [1] Difficulty breathing AND [2] only from stuffy or runny nose    Negative: [1] Difficulty breathing AND [2] within 14 days of COVID-19 Exposure    Negative: Fever > 103 F (39.4 C)    Negative: [1] Fever > 101 F (38.3 C) AND [2] age > 60 years    Negative: [1] Fever > 100.0 F (37.8 C) AND [2] bedridden (e.g., nursing home patient, CVA, chronic illness, recovering from surgery)    Negative: [1] Fever > 100.0 F (37.8 C) AND [2] diabetes mellitus or weak immune system (e.g., HIV positive, cancer chemo, splenectomy, organ transplant, chronic steroids)    Negative: [1]  "Periods where breathing stops and then resumes normally AND [2] bedridden (e.g., nursing home patient, CVA)    Negative: Pregnant or postpartum (from 0 to 6 weeks after delivery)    Negative: Patient sounds very sick or weak to the triager    Negative: [1] MODERATE difficulty breathing (e.g., speaks in phrases, SOB even at rest, pulse 100-120) AND [2] NEW-onset or WORSE than normal    Negative: Oxygen level (e.g., pulse oximetry) 90 percent or lower    Negative: Wheezing can be heard across the room    Negative: Drooling or spitting out saliva (because can't swallow)    Negative: History of prior \"blood clot\" in leg or lungs (i.e., deep vein thrombosis, pulmonary embolism)    Negative: History of inherited increased risk of blood clots (e.g., Factor 5 Leiden, Anti-thrombin 3, Protein C or Protein S deficiency, Prothrombin mutation)    Negative: Major surgery in the past month    Negative: Hip or leg fracture (broken bone) in past month (or had cast on leg or ankle in past month)    Negative: Illness requiring prolonged bedrest in past month (e.g., immobilization, long hospital stay)    Negative: Long-distance travel in past month (e.g., car, bus, train, plane; with trip lasting 6 or more hours)    Negative: Cancer treatment in past six months (or has cancer now)    Negative: Extra heart beats OR irregular heart beating  (i.e., \"palpitations\")    Negative: Oxygen level (e.g., pulse oximetry) 91 to 94 percent    Negative: [1] Longstanding difficulty breathing (e.g., CHF, COPD, emphysema) AND [2] WORSE than normal    Protocols used: BREATHING DIFFICULTY-A-AH      "

## 2022-08-29 ENCOUNTER — INFUSION THERAPY VISIT (OUTPATIENT)
Dept: ONCOLOGY | Facility: CLINIC | Age: 22
End: 2022-08-29
Payer: COMMERCIAL

## 2022-08-29 ENCOUNTER — ONCOLOGY VISIT (OUTPATIENT)
Dept: ONCOLOGY | Facility: CLINIC | Age: 22
End: 2022-08-29
Attending: PEDIATRICS
Payer: COMMERCIAL

## 2022-08-29 ENCOUNTER — APPOINTMENT (OUTPATIENT)
Dept: LAB | Facility: CLINIC | Age: 22
End: 2022-08-29
Payer: COMMERCIAL

## 2022-08-29 ENCOUNTER — TELEPHONE (OUTPATIENT)
Dept: ONCOLOGY | Facility: CLINIC | Age: 22
End: 2022-08-29

## 2022-08-29 VITALS
SYSTOLIC BLOOD PRESSURE: 132 MMHG | BODY MASS INDEX: 28.77 KG/M2 | DIASTOLIC BLOOD PRESSURE: 89 MMHG | TEMPERATURE: 98.4 F | OXYGEN SATURATION: 97 % | RESPIRATION RATE: 16 BRPM | WEIGHT: 167.6 LBS | HEART RATE: 114 BPM

## 2022-08-29 DIAGNOSIS — G81.10 SPASTIC HEMIPLEGIA, UNSPECIFIED ETIOLOGY, UNSPECIFIED LATERALITY (H): Primary | ICD-10-CM

## 2022-08-29 DIAGNOSIS — E83.111 HEMOCHROMATOSIS DUE TO REPEATED RED BLOOD CELL TRANSFUSIONS: ICD-10-CM

## 2022-08-29 DIAGNOSIS — D57.00 SICKLE CELL PAIN CRISIS (H): ICD-10-CM

## 2022-08-29 DIAGNOSIS — I82.611 SUPERFICIAL VENOUS THROMBOSIS OF ARM, RIGHT: ICD-10-CM

## 2022-08-29 DIAGNOSIS — D57.1 HB-SS DISEASE WITHOUT CRISIS (H): Primary | ICD-10-CM

## 2022-08-29 DIAGNOSIS — E83.111 IRON OVERLOAD DUE TO REPEATED RED BLOOD CELL TRANSFUSIONS: ICD-10-CM

## 2022-08-29 LAB
ALBUMIN SERPL-MCNC: 3.9 G/DL (ref 3.4–5)
ALP SERPL-CCNC: 84 U/L (ref 40–150)
ALT SERPL W P-5'-P-CCNC: 63 U/L (ref 0–50)
ANION GAP SERPL CALCULATED.3IONS-SCNC: 11 MMOL/L (ref 3–14)
AST SERPL W P-5'-P-CCNC: 82 U/L (ref 0–45)
BASOPHILS # BLD AUTO: 0.2 10E3/UL (ref 0–0.2)
BASOPHILS NFR BLD AUTO: 2 %
BILIRUB SERPL-MCNC: 4.4 MG/DL (ref 0.2–1.3)
BUN SERPL-MCNC: 13 MG/DL (ref 7–30)
CALCIUM SERPL-MCNC: 8.5 MG/DL (ref 8.5–10.1)
CHLORIDE BLD-SCNC: 108 MMOL/L (ref 94–109)
CO2 SERPL-SCNC: 20 MMOL/L (ref 20–32)
CREAT SERPL-MCNC: 0.55 MG/DL (ref 0.52–1.04)
EOSINOPHIL # BLD AUTO: 0.3 10E3/UL (ref 0–0.7)
EOSINOPHIL NFR BLD AUTO: 2 %
ERYTHROCYTE [DISTWIDTH] IN BLOOD BY AUTOMATED COUNT: 26.4 % (ref 10–15)
ERYTHROCYTE [SEDIMENTATION RATE] IN BLOOD BY WESTERGREN METHOD: 21 MM/HR (ref 0–20)
FERRITIN SERPL-MCNC: 5635 NG/ML (ref 12–150)
GFR SERPL CREATININE-BSD FRML MDRD: >90 ML/MIN/1.73M2
GLUCOSE BLD-MCNC: 80 MG/DL (ref 70–99)
HCT VFR BLD AUTO: 21.3 % (ref 35–47)
HGB BLD-MCNC: 7.7 G/DL (ref 11.7–15.7)
IMM GRANULOCYTES # BLD: 0.1 10E3/UL
IMM GRANULOCYTES NFR BLD: 1 %
IRON SATN MFR SERPL: 85 % (ref 15–46)
IRON SERPL-MCNC: 154 UG/DL (ref 35–180)
LYMPHOCYTES # BLD AUTO: 1.8 10E3/UL (ref 0.8–5.3)
LYMPHOCYTES NFR BLD AUTO: 12 %
MCH RBC QN AUTO: 31.3 PG (ref 26.5–33)
MCHC RBC AUTO-ENTMCNC: 36.2 G/DL (ref 31.5–36.5)
MCV RBC AUTO: 87 FL (ref 78–100)
MONOCYTES # BLD AUTO: 1.1 10E3/UL (ref 0–1.3)
MONOCYTES NFR BLD AUTO: 7 %
NEUTROPHILS # BLD AUTO: 11.4 10E3/UL (ref 1.6–8.3)
NEUTROPHILS NFR BLD AUTO: 76 %
NRBC # BLD AUTO: 0.7 10E3/UL
NRBC BLD AUTO-RTO: 5 /100
PLATELET # BLD AUTO: 390 10E3/UL (ref 150–450)
POTASSIUM BLD-SCNC: 4 MMOL/L (ref 3.4–5.3)
PROT SERPL-MCNC: 8.1 G/DL (ref 6.8–8.8)
RBC # BLD AUTO: 2.46 10E6/UL (ref 3.8–5.2)
SODIUM SERPL-SCNC: 139 MMOL/L (ref 133–144)
TIBC SERPL-MCNC: 181 UG/DL (ref 240–430)
WBC # BLD AUTO: 14.8 10E3/UL (ref 4–11)

## 2022-08-29 PROCEDURE — 99207 PR NO BILLABLE SERVICE THIS VISIT: CPT

## 2022-08-29 PROCEDURE — G0463 HOSPITAL OUTPT CLINIC VISIT: HCPCS

## 2022-08-29 PROCEDURE — 83550 IRON BINDING TEST: CPT | Performed by: PEDIATRICS

## 2022-08-29 PROCEDURE — 36591 DRAW BLOOD OFF VENOUS DEVICE: CPT | Performed by: PEDIATRICS

## 2022-08-29 PROCEDURE — 80053 COMPREHEN METABOLIC PANEL: CPT | Performed by: PEDIATRICS

## 2022-08-29 PROCEDURE — 250N000011 HC RX IP 250 OP 636: Performed by: PEDIATRICS

## 2022-08-29 PROCEDURE — 258N000003 HC RX IP 258 OP 636: Performed by: PEDIATRICS

## 2022-08-29 PROCEDURE — 85004 AUTOMATED DIFF WBC COUNT: CPT | Performed by: PEDIATRICS

## 2022-08-29 PROCEDURE — 96376 TX/PRO/DX INJ SAME DRUG ADON: CPT

## 2022-08-29 PROCEDURE — 85652 RBC SED RATE AUTOMATED: CPT | Performed by: PEDIATRICS

## 2022-08-29 PROCEDURE — 85660 RBC SICKLE CELL TEST: CPT | Performed by: PEDIATRICS

## 2022-08-29 PROCEDURE — 96361 HYDRATE IV INFUSION ADD-ON: CPT

## 2022-08-29 PROCEDURE — 83021 HEMOGLOBIN CHROMOTOGRAPHY: CPT | Performed by: PEDIATRICS

## 2022-08-29 PROCEDURE — 99215 OFFICE O/P EST HI 40 MIN: CPT | Performed by: PEDIATRICS

## 2022-08-29 PROCEDURE — 82728 ASSAY OF FERRITIN: CPT | Performed by: PEDIATRICS

## 2022-08-29 PROCEDURE — 96374 THER/PROPH/DIAG INJ IV PUSH: CPT

## 2022-08-29 RX ORDER — ONDANSETRON 8 MG/1
8 TABLET, FILM COATED ORAL
Status: CANCELLED
Start: 2022-10-01

## 2022-08-29 RX ORDER — OXYCODONE HYDROCHLORIDE 15 MG/1
15 TABLET ORAL EVERY 4 HOURS PRN
Qty: 40 TABLET | Refills: 0 | Status: SHIPPED | OUTPATIENT
Start: 2022-08-31 | End: 2022-09-15

## 2022-08-29 RX ORDER — ASPIRIN 81 MG/1
81 TABLET, CHEWABLE ORAL 2 TIMES DAILY
Qty: 60 TABLET | Refills: 11 | Status: SHIPPED | OUTPATIENT
Start: 2022-08-29 | End: 2022-12-21

## 2022-08-29 RX ORDER — DIPHENHYDRAMINE HCL 25 MG
25 CAPSULE ORAL
Status: CANCELLED
Start: 2022-10-01

## 2022-08-29 RX ORDER — MORPHINE SULFATE 2 MG/ML
2 INJECTION, SOLUTION INTRAMUSCULAR; INTRAVENOUS
Status: DISCONTINUED | OUTPATIENT
Start: 2022-08-29 | End: 2022-08-29 | Stop reason: HOSPADM

## 2022-08-29 RX ORDER — HEPARIN SODIUM (PORCINE) LOCK FLUSH IV SOLN 100 UNIT/ML 100 UNIT/ML
5 SOLUTION INTRAVENOUS
Status: DISCONTINUED | OUTPATIENT
Start: 2022-08-29 | End: 2022-08-29 | Stop reason: HOSPADM

## 2022-08-29 RX ORDER — OXYCODONE HYDROCHLORIDE 15 MG/1
15 TABLET ORAL EVERY 4 HOURS PRN
Qty: 45 TABLET | Refills: 0 | Status: SHIPPED | OUTPATIENT
Start: 2022-08-31 | End: 2022-08-29

## 2022-08-29 RX ORDER — HEPARIN SODIUM (PORCINE) LOCK FLUSH IV SOLN 100 UNIT/ML 100 UNIT/ML
5 SOLUTION INTRAVENOUS
Status: CANCELLED | OUTPATIENT
Start: 2022-10-01

## 2022-08-29 RX ORDER — DEFERASIROX 360 MG/1
1440 TABLET, FILM COATED ORAL EVERY EVENING
Qty: 120 TABLET | Refills: 4 | Status: SHIPPED | OUTPATIENT
Start: 2022-08-29 | End: 2023-01-23

## 2022-08-29 RX ORDER — HEPARIN SODIUM (PORCINE) LOCK FLUSH IV SOLN 100 UNIT/ML 100 UNIT/ML
5 SOLUTION INTRAVENOUS ONCE
Status: COMPLETED | OUTPATIENT
Start: 2022-08-29 | End: 2022-08-29

## 2022-08-29 RX ORDER — HEPARIN SODIUM,PORCINE 10 UNIT/ML
5 VIAL (ML) INTRAVENOUS
Status: CANCELLED | OUTPATIENT
Start: 2022-10-01

## 2022-08-29 RX ORDER — MORPHINE SULFATE 2 MG/ML
2 INJECTION, SOLUTION INTRAMUSCULAR; INTRAVENOUS
Status: CANCELLED
Start: 2022-10-01

## 2022-08-29 RX ADMIN — DEXTROSE AND SODIUM CHLORIDE 1000 ML: 5; 450 INJECTION, SOLUTION INTRAVENOUS at 13:05

## 2022-08-29 RX ADMIN — Medication 5 ML: at 11:31

## 2022-08-29 RX ADMIN — Medication 5 ML: at 15:19

## 2022-08-29 RX ADMIN — MORPHINE SULFATE 2 MG: 2 INJECTION, SOLUTION INTRAMUSCULAR; INTRAVENOUS at 15:03

## 2022-08-29 RX ADMIN — MORPHINE SULFATE 2 MG: 2 INJECTION, SOLUTION INTRAMUSCULAR; INTRAVENOUS at 13:05

## 2022-08-29 RX ADMIN — MORPHINE SULFATE 2 MG: 2 INJECTION, SOLUTION INTRAMUSCULAR; INTRAVENOUS at 14:05

## 2022-08-29 ASSESSMENT — PAIN SCALES - GENERAL
PAINLEVEL: NO PAIN (0)
PAINLEVEL: EXTREME PAIN (8)

## 2022-08-29 NOTE — LETTER
8/29/2022         RE: Jennifer Cervantes  [address]  [facility] [address]        Dear Colleague,    Thank you for referring your patient, Jennifer Cervantes, to the [facility]. Please see a copy of my visit note below.        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 She had multiple thromboembolic events in 2021 despite adherent anticoagulation use (though warfarin was perpetually low) and there are concerns for chronic thromboembolic disease but did not have pulmonary HTN on a November 2021 cath. She is maintained on chronic PO opioids and twice-weekly infusion visits (since 1/24/22) but has been able to be maintained on this regimen and has stayed out of the ED most of the time with even rarer admissions.   on.    Interval History:  Jennifer is in clinic today alone. She continues to feel well and has an ambitious goal to be off regular opioids by here 23th birthday in March. She has remained out of the hospital with two ED visits in the last month, though one was for respiratory distress rather than pain (Nuc Med PE testing was negative, and she has an upcoming pulmonology appointment). She has continued to do infusions twice weekly and the plan is working. Ketamine is working for the ED and she had respectful care in both ED visits.      She is tolerating the Jadenu and is looking to restart Desferal too if possible.  She has not missed any doses of her aspirin.  She does want to look at getting her  "own place within the next year. She is on good terms with her mom and her mom thinks she is ready to live a bit more independently. She also has a cousin who is a manager at [employer] and she is hoping he can help coordinate a testbirds's position for Jennifer.      Sickle Cell Disease Comprehensive Checklist    Bone Health/Avascular Necrosis Screening/Symptoms (each visit): no new concerns today    Leg Ulcer evaluation (every visit): none    Hypertension (every visit):stable, high normal    Last pulmonary evaluation (asthma, AMAN, pulm HTN): within last year( due again)    Stroke/silent cerebral infarct Hx (Y/N): Yes TIA ~2014, first event ~age 1 with full stroke and R sided weakness    Last PCP Visit: 8/2020    Vaccines:  ? PCV13: 5/13/19  ? Pneumovax (PPSV23): 3/04, 10/09, 7/12/19 (next due 7/2024)  ? Menactra: 4/2010, 9/2015 (MCV done 8/16/21)  ? Influenza: got 20-21 vaccine per self report    Audiology (chelation): done 6/2020, normal.. However, on 6/7, \"While there is no significant change in grade on the CTCAE4.03 Scale, there were hearing changes bilaterally for both standard and extended high frequency audiometry.  Will need to determine if an ENT consult is advised due to the asymmetry in extended high frequency testing.\"     Plan last reviewed with patient: 7/11/22    Patient background: 23 yo F, enjoys movies and kids though there are times where she does not really want to talk to people. Does not have a lot of social support at home.     Sickle Cell Disease History  Primary Hematologist Team: Jose Rafael Duncan  PCP: none  Genotype: SS  Acute Pain Crisis Treatment: (avoiding IV opioids for now, which she has agreed to)    ER   o Oxycodone 15-20 mg x1  o Ketamine 4mg IV x 2--helps with opioid sparing  o Toradol 15 mg IV x1   o Maintenance IV fluids with LR  o Other: Zofran 8 mg IV PRN nausea    Inpatient:  o Home oxycodone/Oxycontin regimen, though home oxycodone dosing could be increased to 20 mg to start  o PCA " plan:   - None for now  o Other Medications: Zofran  o She has had success with ketamine starting at 4mg/h and advancing only to 6mg/h, as 8mg/h made her feel quite poorly..  o ASA  o Supportive Care: Docusate, Senna  Chronic Pain Medications:    Home regimen Oxycontin 10 mg q12h, oxycodone 15 mg p.o. q.4-6 hours p.r.n. breakthrough pain.  She also continues on Voltaren gel, and Zoloft among other medications.    -Also benefits from mental health visits, acupuncture  Baseline Hemoglobin: 7 g/dl without chronic transfusions  Hydroxyurea use: Yes  H/O blood transfusions: Yes, several (iron overload) Most recent 11/20/2021    H/O Transfusion Reactions: no    Antibodies:none  H/O Acute Chest Syndrome: Yes    Last episode: 10/2019     ICU/intubation: unsure  H/O Stroke: Yes (managed with chronic transfusions in the past, stopped late Spring 2020)  H/O VTE: Yes (2/2021)  H/O Cholecystectomy or Splenectomy: no  H/O Asthma, Pulm HTN, AVN, Leg Ulcers, Nephropathy, Retinopathy, etc: Iron overload, asthma, chronic lung disease, physical limitations from early stroke    ---------------------------------------  Jennifer Cervantes's Goals (discussed 8/29/22)    1-3 month goal:      6 month goal:  Work on finding a job, Work on driving    12 month goal:  Move into her own place    Disease-specific goal(s):  Continue to stay out of the hospital, be off regular opioids around March of next year  ---------------------------------------      Current Outpatient Medications   Medication Sig Dispense Refill     acetaminophen (TYLENOL) 325 MG tablet Take 2 tablets (650 mg) by mouth every 6 hours as needed for mild pain 120 tablet 3     albuterol (PROVENTIL) (2.5 MG/3ML) 0.083% neb solution Take 1 vial (2.5 mg) by nebulization every 6 hours as needed for shortness of breath / dyspnea or wheezing 12 mL 4     albuterol (PROVENTIL) (2.5 MG/3ML) 0.083% neb solution Take 2 vials (5 mg) by nebulization every 6 hours as needed for shortness of breath  / dyspnea or wheezing 90 mL 3     aspirin (ASA) 81 MG chewable tablet Take 1 tablet (81 mg) by mouth 2 times daily 60 tablet 11     budesonide-formoterol (SYMBICORT) 160-4.5 MCG/ACT Inhaler Inhale 2 puffs into the lungs 2 times daily 10.2 g 3     deferasirox (JADENU) 360 MG tablet Take 4 tablets (1,440 mg) by mouth every evening 120 tablet 4     diphenhydrAMINE (BENADRYL) 25 MG capsule Take 1-2 capsules (25-50 mg) by mouth nightly as needed for sleep 60 capsule 3     EPINEPHrine (ANY BX GENERIC EQUIV) 0.3 MG/0.3ML injection 2-pack Inject 0.3 mLs (0.3 mg) into the muscle as needed for anaphylaxis (Patient not taking: No sig reported) 1 each 1     Hydroxyurea 1000 MG TABS Take 3,000 mg by mouth daily 90 tablet 3     ondansetron (ZOFRAN) 8 MG tablet Take 1 tablet (8 mg) by mouth every 8 hours as needed 30 tablet 1     oxyCODONE IR (ROXICODONE) 15 MG tablet Take 1 tablet (15 mg) by mouth every 4 hours as needed for severe pain Goal 4 per day. Max 6 per day. 45 tablet 0       Past Medical History  Past Medical History:   Diagnosis Date     Anxiety      Bleeding disorder (H)      Blood clotting disorder (H)      Cerebral infarction (H) 2015     Cognitive developmental delay     low IQ. Please recognize when managing pain and planning with her     Depressive disorder      Hemiplegia and hemiparesis following cerebral infarction affecting right dominant side (H)     right hand contractures     Iron overload due to repeated red blood cell transfusions      Migraines      Multiple subsegmental pulmonary emboli without acute cor pulmonale (H) 02/01/2021     Oppositional defiant behavior      Superficial venous thrombosis of arm, right 03/25/2021     Uncomplicated asthma      Past Surgical History:   Procedure Laterality Date     AS INSERT TUNNELED CV 2 CATH W/O PORT/PUMP       CHOLECYSTECTOMY       CV RIGHT HEART CATH MEASUREMENTS RECORDED N/A 11/18/2021    Procedure: Right Heart Cath;  Surgeon: Jackson Stauffer MD;   Location:  HEART CARDIAC CATH LAB     INSERT PORT VASCULAR ACCESS Left 4/21/2021    Procedure: INSERTION, VASCULAR ACCESS PORT (NOT SURE ON SIDE UNTIL REMOVAL);  Surgeon: Rajan More MD;  Location: [facility] OR     IR CHEST PORT PLACEMENT > 5 YRS OF AGE  4/21/2021     IR CVC NON TUNNEL LINE REMOVAL  5/6/2021     IR CVC NON TUNNEL PLACEMENT  04/07/2020     IR CVC NON TUNNEL PLACEMENT  4/30/2021     IR PORT REMOVAL LEFT  4/21/2021     REMOVE PORT VASCULAR ACCESS Left 4/21/2021    Procedure: REMOVAL, VASCULAR ACCESS PORT LEFT;  Surgeon: Rajan More MD;  Location: [facility] OR     REPAIR TENDON ELBOW Right 10/02/2019    Procedure: Right Elbow Flexor Lengthening, Flexor Pronator Slide Of Wrist and Finger, Thumb Adductor Lengthening;  Surgeon: Anai Franco MD;  Location: [facility] OR     TONSILLECTOMY Bilateral 10/02/2019    Procedure: Bilateral Tonsillectomy;  Surgeon: Farhana Guy MD;  Location: [facility] OR     ZZC BREAST REDUCTION (INCLUDES LIPO) TIER 3 Bilateral 04/23/2019     Allergies   Allergen Reactions     Contrast Dye      Hives and breathing issues     Fish-Derived Products Hives     Seafood Hives     Diagnostic X-Ray Materials      Gadolinium      Social History   Social History     Tobacco Use     Smoking status: Never Smoker     Smokeless tobacco: Never Used   Substance Use Topics     Alcohol use: Not Currently     Alcohol/week: 0.0 standard drinks     Drug use: Never    Still living at home with mom and extended family.     Past medical history and social history were reviewed.    Physical Examination:  /89   Pulse 114   Temp 98.4  F (36.9  C) (Oral)   Resp 16   Wt 76 kg (167 lb 9.6 oz)   SpO2 97%   BMI 28.77 kg/m    Wt Readings from Last 10 Encounters:   08/29/22 76 kg (167 lb 9.6 oz)   08/20/22 77.6 kg (171 lb)   07/28/22 75.3 kg (166 lb)   07/15/22 77.1 kg (170 lb)   07/11/22 76.9 kg (169 lb 8 oz)   06/26/22 76.7 kg (169 lb)   06/20/22 76.8 kg (169 lb 6.4 oz)   06/06/22 76.7 kg (169  lb)   05/27/22 77.2 kg (170 lb 3.2 oz)   05/17/22 77.9 kg (171 lb 11.8 oz)     General: Reasonably well-appearing today consistent with her baseline. In good spirits  Eyes: EOMI, PERRL. No significant scleral icterus.  Skin: no bruising noted on exposed skin. Port site c/d/i, not yet accessed  Respiratory:  Normal respiratory effort. Lungs clear to auscultation.  Cardiac: RRR, normal rhythm. No murmur.  Neurologic: Cranial nerves II through XII are grossly intact. Contractured right hand 2/2 stroke history, mild antalgic gait    Laboratory Data:   Latest Reference Range & Units 07/11/22 11:43   Sodium 133 - 144 mmol/L 139   Potassium 3.4 - 5.3 mmol/L 3.7   Chloride 94 - 109 mmol/L 111 (H)   Carbon Dioxide 20 - 32 mmol/L 20   Urea Nitrogen 7 - 30 mg/dL 6 (L)   Creatinine 0.52 - 1.04 mg/dL 0.47 (L)   GFR Estimate >60 mL/min/1.73m2 >90   Calcium 8.5 - 10.1 mg/dL 8.6   Anion Gap 3 - 14 mmol/L 8   Albumin 3.4 - 5.0 g/dL 4.2   Protein Total 6.8 - 8.8 g/dL 7.8   Alkaline Phosphatase 40 - 150 U/L 83   ALT 0 - 50 U/L 42   AST 0 - 45 U/L 63 (H)   Bilirubin Total 0.2 - 1.3 mg/dL 4.4 (H)   Ferritin 12 - 150 ng/mL 5,623 (H)   Hemoglobin A 95.0 - 97.9 % 0.0 (L)   Hemoglobin A2 2.0 - 3.5 % 3.3   Hemoglobin C 0.0 - 0.0 % 0.0   Hemoglobin E 0.0 - 0.0 % 0.0   Hemoglobin F 0.0 - 2.1 % 4.8 (H)   Hemoglobin S 0.0 - 0.0 % 90.6 (H)   Hemoglobin Other 0.0 - 0.0 % 1.3 (H)   Glucose 70 - 99 mg/dL 88   WBC 4.0 - 11.0 10e3/uL 10.5   Hemoglobin 11.7 - 15.7 g/dL 7.3 (L)   Hematocrit 35.0 - 47.0 % 20.9 (L)   Platelet Count 150 - 450 10e3/uL 487 (H)   RBC Count 3.80 - 5.20 10e6/uL 2.41 (L)   MCV 78 - 100 fL 87   MCH 26.5 - 33.0 pg 30.3   MCHC 31.5 - 36.5 g/dL 34.9   RDW 10.0 - 15.0 % 25.3 (H)   % Neutrophils % 68   % Lymphocytes % 20   % Monocytes % 7   % Eosinophils % 3   % Basophils % 1   Absolute Basophils 0.0 - 0.2 10e3/uL 0.1   Absolute Eosinophils 0.0 - 0.7 10e3/uL 0.3   Absolute Immature Granulocytes <=0.4 10e3/uL 0.1   Absolute Lymphocytes  0.8 - 5.3 10e3/uL 2.1   Absolute Monocytes 0.0 - 1.3 10e3/uL 0.7   % Immature Granulocytes % 1   Absolute Neutrophils 1.6 - 8.3 10e3/uL 7.2   Absolute NRBCs 10e3/uL 0.5   NRBCs per 100 WBC <1 /100 5 (H)   % Retic 0.5 - 2.0 % 20.1 (H)   Absolute Retic 0.025 - 0.095 10e6/uL 0.495 (H)   (H): Data is abnormally high  (L): Data is abnormally low    Most recent labs reviewed and interpreted by me today.    Assessment and Plan:  1. Sickle Cell HgbSS Disease  2. Chronic Pain  3. Iron overload  4. 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 History of CVA  6. Hearing loss    Overall Jennifer has been doing well with pain management and pain management has been better over the past few months. We have been able to cut down significantly on ED visits, with and her last admission was 1/29/22 and even two ED visits last month was a bit surprising. She is of the mind that she wants to drop opioids more significantly so we will wean every few weeks to have her ideally to PRN opioids next spring or early summer. If we can do that, I don't think we would need the Suboxone, through we did talk about it as a plan B. We will plan on weaning oxycodone further in 1-2 months (September?)     We will start back on Desferal in addition to the Jadenu. Ferritin stable but she is due for repeat Ferriscan.    I do support her efforts to live independently and will help out as  Ican    Plan:  -Continue Hydrea to 3000mg daily to help lessen frequency of sickle cell pain  -Continue Oxycodone at home but decreasing Rx to 40 tabs/week. She can self-reduce infusion days/week but I will keep the cap at 2/week for now  -Infusion center visits limited to two times per week. Continue diligent home management with current medications, heat, rest, compression, warm baths.   -If unable to manage at home can go to ED but continue to not do IV narcotics in the emergency room. Ketamine previously added to pain plan in ED  -Restart  deferoxamine in addition to deferasirox. Ferriscan ordered for October.  -Continue aspirin BID.   -RTC in ~4 weeks .    43 minutes spent on the date of the encounter doing chart review, review of test results, patient visit and documentation       Again, thank you for allowing me to participate in the care of your patient.      Sincerely,    Eric Duncan MD

## 2022-08-29 NOTE — LETTER
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 She had multiple thromboembolic events in 2021 despite adherent anticoagulation use (though warfarin was perpetually low) and there are concerns for chronic thromboembolic disease but did not have pulmonary HTN on a November 2021 cath. She is maintained on chronic PO opioids and twice-weekly infusion visits (since 1/24/22) but has been able to be maintained on this regimen and has stayed out of the ED most of the time with even rarer admissions.   on.    Interval History:  Jennifer is in clinic today alone. She continues to feel well and has an ambitious goal to be off regular opioids by here 23th birthday in March. She has remained out of the hospital with two ED visits in the last month, though one was for respiratory distress rather than pain (Nuc Med PE testing was negative, and she has an upcoming pulmonology appointment). She has continued to do infusions twice weekly and the plan is working. Ketamine is working for the ED and she had respectful care in both ED visits.      She is tolerating the Jadenu and is looking to restart Desferal too if possible.  She has not missed any doses of her aspirin.  She does want to look at getting her own place within the next year. She is on good terms with her mom and her mom thinks she is ready to live a bit more independently. She also has a cousin who is a  "manager at [employer] and she is hoping he can help coordinate a 's position for Jennifer.      Sickle Cell Disease Comprehensive Checklist    Bone Health/Avascular Necrosis Screening/Symptoms (each visit): no new concerns today    Leg Ulcer evaluation (every visit): none    Hypertension (every visit):stable, high normal    Last pulmonary evaluation (asthma, AMAN, pulm HTN): within last year( due again)    Stroke/silent cerebral infarct Hx (Y/N): Yes TIA ~2014, first event ~age 1 with full stroke and R sided weakness    Last PCP Visit: 8/2020    Vaccines:  ? PCV13: 5/13/19  ? Pneumovax (PPSV23): 3/04, 10/09, 7/12/19 (next due 7/2024)  ? Menactra: 4/2010, 9/2015 (MCV done 8/16/21)  ? Influenza: got 20-21 vaccine per self report    Audiology (chelation): done 6/2020, normal.. However, on 6/7, \"While there is no significant change in grade on the CTCAE4.03 Scale, there were hearing changes bilaterally for both standard and extended high frequency audiometry.  Will need to determine if an ENT consult is advised due to the asymmetry in extended high frequency testing.\"     Plan last reviewed with patient: 7/11/22    Patient background: 21 yo F, enjoys movies and kids though there are times where she does not really want to talk to people. Does not have a lot of social support at home.     Sickle Cell Disease History  Primary Hematologist Team: Jose Rafael Duncan  PCP: none  Genotype: SS  Acute Pain Crisis Treatment: (avoiding IV opioids for now, which she has agreed to)    ER   o Oxycodone 15-20 mg x1  o Ketamine 4mg IV x 2--helps with opioid sparing  o Toradol 15 mg IV x1   o Maintenance IV fluids with LR  o Other: Zofran 8 mg IV PRN nausea    Inpatient:  o Home oxycodone/Oxycontin regimen, though home oxycodone dosing could be increased to 20 mg to start  o PCA plan:   - None for now  o Other Medications: Zofran  o She has had success with ketamine starting at 4mg/h and advancing only to 6mg/h, as 8mg/h made her feel " quite poorly..  o ASA  o Supportive Care: Docusate, Senna  Chronic Pain Medications:    Home regimen Oxycontin 10 mg q12h, oxycodone 15 mg p.o. q.4-6 hours p.r.n. breakthrough pain.  She also continues on Voltaren gel, and Zoloft among other medications.    -Also benefits from mental health visits, acupuncture  Baseline Hemoglobin: 7 g/dl without chronic transfusions  Hydroxyurea use: Yes  H/O blood transfusions: Yes, several (iron overload) Most recent 11/20/2021    H/O Transfusion Reactions: no    Antibodies:none  H/O Acute Chest Syndrome: Yes    Last episode: 10/2019     ICU/intubation: unsure  H/O Stroke: Yes (managed with chronic transfusions in the past, stopped late Spring 2020)  H/O VTE: Yes (2/2021)  H/O Cholecystectomy or Splenectomy: no  H/O Asthma, Pulm HTN, AVN, Leg Ulcers, Nephropathy, Retinopathy, etc: Iron overload, asthma, chronic lung disease, physical limitations from early stroke    ---------------------------------------  Jennifer Cervantes's Goals (discussed 8/29/22)    1-3 month goal:      6 month goal:  Work on finding a job, Work on driving    12 month goal:  Move into her own place    Disease-specific goal(s):  Continue to stay out of the hospital, be off regular opioids around March of next year  ---------------------------------------      Current Outpatient Medications   Medication Sig Dispense Refill     acetaminophen (TYLENOL) 325 MG tablet Take 2 tablets (650 mg) by mouth every 6 hours as needed for mild pain 120 tablet 3     albuterol (PROVENTIL) (2.5 MG/3ML) 0.083% neb solution Take 1 vial (2.5 mg) by nebulization every 6 hours as needed for shortness of breath / dyspnea or wheezing 12 mL 4     albuterol (PROVENTIL) (2.5 MG/3ML) 0.083% neb solution Take 2 vials (5 mg) by nebulization every 6 hours as needed for shortness of breath / dyspnea or wheezing 90 mL 3     aspirin (ASA) 81 MG chewable tablet Take 1 tablet (81 mg) by mouth 2 times daily 60 tablet 11     budesonide-formoterol  (SYMBICORT) 160-4.5 MCG/ACT Inhaler Inhale 2 puffs into the lungs 2 times daily 10.2 g 3     deferasirox (JADENU) 360 MG tablet Take 4 tablets (1,440 mg) by mouth every evening 120 tablet 4     diphenhydrAMINE (BENADRYL) 25 MG capsule Take 1-2 capsules (25-50 mg) by mouth nightly as needed for sleep 60 capsule 3     EPINEPHrine (ANY BX GENERIC EQUIV) 0.3 MG/0.3ML injection 2-pack Inject 0.3 mLs (0.3 mg) into the muscle as needed for anaphylaxis (Patient not taking: No sig reported) 1 each 1     Hydroxyurea 1000 MG TABS Take 3,000 mg by mouth daily 90 tablet 3     ondansetron (ZOFRAN) 8 MG tablet Take 1 tablet (8 mg) by mouth every 8 hours as needed 30 tablet 1     oxyCODONE IR (ROXICODONE) 15 MG tablet Take 1 tablet (15 mg) by mouth every 4 hours as needed for severe pain Goal 4 per day. Max 6 per day. 45 tablet 0       Past Medical History  Past Medical History:   Diagnosis Date     Anxiety      Bleeding disorder (H)      Blood clotting disorder (H)      Cerebral infarction (H) 2015     Cognitive developmental delay     low IQ. Please recognize when managing pain and planning with her     Depressive disorder      Hemiplegia and hemiparesis following cerebral infarction affecting right dominant side (H)     right hand contractures     Iron overload due to repeated red blood cell transfusions      Migraines      Multiple subsegmental pulmonary emboli without acute cor pulmonale (H) 02/01/2021     Oppositional defiant behavior      Superficial venous thrombosis of arm, right 03/25/2021     Uncomplicated asthma      Past Surgical History:   Procedure Laterality Date     AS INSERT TUNNELED CV 2 CATH W/O PORT/PUMP       CHOLECYSTECTOMY       CV RIGHT HEART CATH MEASUREMENTS RECORDED N/A 11/18/2021    Procedure: Right Heart Cath;  Surgeon: Jackson Stauffer MD;  Location:  HEART CARDIAC CATH LAB     INSERT PORT VASCULAR ACCESS Left 4/21/2021    Procedure: INSERTION, VASCULAR ACCESS PORT (NOT SURE ON SIDE UNTIL  REMOVAL);  Surgeon: Rajan More MD;  Location: [facility] OR     IR CHEST PORT PLACEMENT > 5 YRS OF AGE  4/21/2021     IR CVC NON TUNNEL LINE REMOVAL  5/6/2021     IR CVC NON TUNNEL PLACEMENT  04/07/2020     IR CVC NON TUNNEL PLACEMENT  4/30/2021     IR PORT REMOVAL LEFT  4/21/2021     REMOVE PORT VASCULAR ACCESS Left 4/21/2021    Procedure: REMOVAL, VASCULAR ACCESS PORT LEFT;  Surgeon: Rajan More MD;  Location: [facility] OR     REPAIR TENDON ELBOW Right 10/02/2019    Procedure: Right Elbow Flexor Lengthening, Flexor Pronator Slide Of Wrist and Finger, Thumb Adductor Lengthening;  Surgeon: Anai Franco MD;  Location: [facility]     TONSILLECTOMY Bilateral 10/02/2019    Procedure: Bilateral Tonsillectomy;  Surgeon: Farhana Guy MD;  Location: [facility] OR     ZZC BREAST REDUCTION (INCLUDES LIPO) TIER 3 Bilateral 04/23/2019     Allergies   Allergen Reactions     Contrast Dye      Hives and breathing issues     Fish-Derived Products Hives     Seafood Hives     Diagnostic X-Ray Materials      Gadolinium      Social History   Social History     Tobacco Use     Smoking status: Never Smoker     Smokeless tobacco: Never Used   Substance Use Topics     Alcohol use: Not Currently     Alcohol/week: 0.0 standard drinks     Drug use: Never    Still living at home with mom and extended family.     Past medical history and social history were reviewed.    Physical Examination:  /89   Pulse 114   Temp 98.4  F (36.9  C) (Oral)   Resp 16   Wt 76 kg (167 lb 9.6 oz)   SpO2 97%   BMI 28.77 kg/m    Wt Readings from Last 10 Encounters:   08/29/22 76 kg (167 lb 9.6 oz)   08/20/22 77.6 kg (171 lb)   07/28/22 75.3 kg (166 lb)   07/15/22 77.1 kg (170 lb)   07/11/22 76.9 kg (169 lb 8 oz)   06/26/22 76.7 kg (169 lb)   06/20/22 76.8 kg (169 lb 6.4 oz)   06/06/22 76.7 kg (169 lb)   05/27/22 77.2 kg (170 lb 3.2 oz)   05/17/22 77.9 kg (171 lb 11.8 oz)     General: Reasonably well-appearing today consistent with her baseline. In  good spirits  Eyes: EOMI, PERRL. No significant scleral icterus.  Skin: no bruising noted on exposed skin. Port site c/d/i, not yet accessed  Respiratory:  Normal respiratory effort. Lungs clear to auscultation.  Cardiac: RRR, normal rhythm. No murmur.  Neurologic: Cranial nerves II through XII are grossly intact. Contractured right hand 2/2 stroke history, mild antalgic gait    Laboratory Data:   Latest Reference Range & Units 07/11/22 11:43   Sodium 133 - 144 mmol/L 139   Potassium 3.4 - 5.3 mmol/L 3.7   Chloride 94 - 109 mmol/L 111 (H)   Carbon Dioxide 20 - 32 mmol/L 20   Urea Nitrogen 7 - 30 mg/dL 6 (L)   Creatinine 0.52 - 1.04 mg/dL 0.47 (L)   GFR Estimate >60 mL/min/1.73m2 >90   Calcium 8.5 - 10.1 mg/dL 8.6   Anion Gap 3 - 14 mmol/L 8   Albumin 3.4 - 5.0 g/dL 4.2   Protein Total 6.8 - 8.8 g/dL 7.8   Alkaline Phosphatase 40 - 150 U/L 83   ALT 0 - 50 U/L 42   AST 0 - 45 U/L 63 (H)   Bilirubin Total 0.2 - 1.3 mg/dL 4.4 (H)   Ferritin 12 - 150 ng/mL 5,623 (H)   Hemoglobin A 95.0 - 97.9 % 0.0 (L)   Hemoglobin A2 2.0 - 3.5 % 3.3   Hemoglobin C 0.0 - 0.0 % 0.0   Hemoglobin E 0.0 - 0.0 % 0.0   Hemoglobin F 0.0 - 2.1 % 4.8 (H)   Hemoglobin S 0.0 - 0.0 % 90.6 (H)   Hemoglobin Other 0.0 - 0.0 % 1.3 (H)   Glucose 70 - 99 mg/dL 88   WBC 4.0 - 11.0 10e3/uL 10.5   Hemoglobin 11.7 - 15.7 g/dL 7.3 (L)   Hematocrit 35.0 - 47.0 % 20.9 (L)   Platelet Count 150 - 450 10e3/uL 487 (H)   RBC Count 3.80 - 5.20 10e6/uL 2.41 (L)   MCV 78 - 100 fL 87   MCH 26.5 - 33.0 pg 30.3   MCHC 31.5 - 36.5 g/dL 34.9   RDW 10.0 - 15.0 % 25.3 (H)   % Neutrophils % 68   % Lymphocytes % 20   % Monocytes % 7   % Eosinophils % 3   % Basophils % 1   Absolute Basophils 0.0 - 0.2 10e3/uL 0.1   Absolute Eosinophils 0.0 - 0.7 10e3/uL 0.3   Absolute Immature Granulocytes <=0.4 10e3/uL 0.1   Absolute Lymphocytes 0.8 - 5.3 10e3/uL 2.1   Absolute Monocytes 0.0 - 1.3 10e3/uL 0.7   % Immature Granulocytes % 1   Absolute Neutrophils 1.6 - 8.3 10e3/uL 7.2   Absolute  NRBCs 10e3/uL 0.5   NRBCs per 100 WBC <1 /100 5 (H)   % Retic 0.5 - 2.0 % 20.1 (H)   Absolute Retic 0.025 - 0.095 10e6/uL 0.495 (H)   (H): Data is abnormally high  (L): Data is abnormally low    Most recent labs reviewed and interpreted by me today.      Assessment and Plan:  1. Sickle Cell HgbSS Disease  2. Chronic Pain  3. Iron overload  4. 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 History of CVA  6. Hearing loss    Overall Jennifer has been doing well with pain management and pain management has been better over the past few months. We have been able to cut down significantly on ED visits, with and her last admission was 1/29/22 and even two ED visits last month was a bit surprising. She is of the mind that she wants to drop opioids more significantly so we will wean every few weeks to have her ideally to PRN opioids next spring or early summer. If we can do that, I don't think we would need the Suboxone, through we did talk about it as a plan B. We will plan on weaning oxycodone further in 1-2 months (September?)     We will start back on Desferal in addition to the Jadenu. Ferritin stable but she is due for repeat Ferriscan.    I do support her efforts to live independently and will help out as  Ican    Plan:  -Continue Hydrea to 3000mg daily to help lessen frequency of sickle cell pain  -Continue Oxycodone at home but decreasing Rx to 40 tabs/week. She can self-reduce infusion days/week but I will keep the cap at 2/week for now  -Infusion center visits limited to two times per week. Continue diligent home management with current medications, heat, rest, compression, warm baths.   -If unable to manage at home can go to ED but continue to not do IV narcotics in the emergency room. Ketamine previously added to pain plan in ED  -Restart deferoxamine in addition to deferasirox. Ferriscan ordered for October.  -Continue aspirin BID.   -RTC in ~4 weeks .    43 minutes spent on the date  of the encounter doing chart review, review of test results, patient visit and documentation       Eric Duncan MD

## 2022-08-29 NOTE — PATIENT INSTRUCTIONS
Contact Numbers  [facility]: [phone]    After Hours:  [phone]  Triage: [phone]    Please call the [facility] Triage line if you experience a temperature greater than or equal to 100.5, shaking chills, have uncontrolled nausea, vomiting and/or diarrhea, dizziness, shortness of breath, chest pain, bleeding, unexplained bruising, or if you have any other new/concerning symptoms, questions or concerns.     If it is after hours, weekends, or holidays, please call the main hospital  at  [phone] and ask to speak to the Oncology doctor on call.     If you are having any concerning symptoms or wish to speak to a provider before your next infusion visit, please call your care coordinator or triage to notify them so we can adequately serve you.     If you need a refill on a narcotic prescription or other medication, please call triage before your infusion appointment.       August 2022 Sunday Monday Tuesday Wednesday Thursday Friday Saturday        1    ONC INFUSION 2 HR (120 MIN)   8:30 AM   (120 min.)    ONC INFUSION NURSE   [facility] 2     3     4    BMT INFUSION 2 HR (120 MIN)   1:00 PM   (120 min.)    BMT INFUSION NURSE   [facility] [place] 5     6       7     8    ONC INFUSION 2 HR (120 MIN)   1:30 PM   (120 min.)    ONC INFUSION NURSE   [facility] 9     10     11    SPEC INFUSION 2 HR (120 MIN)   3:00 PM   (120 min.)   [facility] SIPC INFUSION NURSE   [facility] 12     13    Admission   8:35 PM   Jose Manuel Calderón MD   [facility]   (Discharge: 8/13/2022)   14     15    SPEC INFUSION 2 HR (120 MIN)  11:00 AM   (120 min.)   [facility] SIPC INFUSION NURSE   [facility] 16     17     18     19    BMT INFUSION 2 HR (120 MIN)   1:00 PM   (120 min.)   UC BMT  INFUSION NURSE   [facility] [place] 20    Admission   8:11 PM   Jackie Dael MD   [facility] Emergency Department   (Discharge: 8/21/2022)    XR CHEST PORT 1 VIEW   8:35 PM   (20 min.)   [id number]   [facility]   21    NM LUNG SCAN VENT/PERF  12:15 AM   (60 min.)   [id number]   [facility] 22    SPEC INFUSION 2 HR (120 MIN)   2:00 PM   (120 min.)   [facility] SIPC INFUSION NURSE   [facility] 23     24     25    SPEC INFUSION 2 HR (120 MIN)   1:00 PM   (120 min.)   [facility] SIPC INFUSION NURSE   [facility]    [facility] PHYSICAL   1:45 PM   (60 min.)   Jason Barnes MD   [facility] [place] 26     27       28     29    LAB PERIPHERAL  11:30 AM   (15 min.)    [facility] LAB DRAW   [facility]    RETURN  11:45 AM   (30 min.)   Eric Duncan MD   [facility]    ONC INFUSION 2 HR (120 MIN)   1:00 PM   (120 min.)   UC ONC INFUSION NURSE   [facility] 30    [facility]   9:30 AM   (60 min.)   Ifeanyi Garg [facility] 31 September 2022 Sunday Monday Tuesday Wednesday Thursday Friday Saturday                       1     2     3       4     5     6     7     8     9     10       11     12     13     14     15     16     17       18     19     20     21     22     23     24       25     26    LAB PERIPHERAL  11:30 AM   (15 min.)   [facility] DRAW   [facility]    RETURN  11:45 AM   (30 min.)   Eric Duncan MD   [facility]    ONC INFUSION 2 HR (120 MIN)   1:00 PM   (120 min.)   UC ONC INFUSION NURSE   [facility] 27     28    [facility] PFT   3:15 PM   (30 min.)   UC PFL D   M  [facility] Pulmonary Function Testing [facility] RETURN   4:00 PM   (40 min.)   Rosalva Gasca MD M [facility] [place] 29 30                            Lab Results:  Recent Results (from the past 12 hour(s))   Erythrocyte sedimentation rate auto    Collection Time: 08/29/22 11:30 AM   Result Value Ref Range    Erythrocyte Sedimentation Rate 21 (H) 0 - 20 mm/hr   Iron and iron binding capacity    Collection Time: 08/29/22 11:30 AM   Result Value Ref Range    Iron 154 35 - 180 ug/dL    Iron Binding Capacity 181 (L) 240 - 430 ug/dL    Iron Sat Index 85 (H) 15 - 46 %   Ferritin    Collection Time: 08/29/22 11:30 AM   Result Value Ref Range    Ferritin 5,635 (H) 12 - 150 ng/mL   Comprehensive metabolic panel    Collection Time: 08/29/22 11:30 AM   Result Value Ref Range    Sodium 139 133 - 144 mmol/L    Potassium 4.0 3.4 - 5.3 mmol/L    Chloride 108 94 - 109 mmol/L    Carbon Dioxide (CO2) 20 20 - 32 mmol/L    Anion Gap 11 3 - 14 mmol/L    Urea Nitrogen 13 7 - 30 mg/dL    Creatinine 0.55 0.52 - 1.04 mg/dL    Calcium 8.5 8.5 - 10.1 mg/dL    Glucose 80 70 - 99 mg/dL    Alkaline Phosphatase 84 40 - 150 U/L    AST 82 (H) 0 - 45 U/L    ALT 63 (H) 0 - 50 U/L    Protein Total 8.1 6.8 - 8.8 g/dL    Albumin 3.9 3.4 - 5.0 g/dL    Bilirubin Total 4.4 (H) 0.2 - 1.3 mg/dL    GFR Estimate >90 >60 mL/min/1.73m2   CBC with platelets and differential    Collection Time: 08/29/22 11:30 AM   Result Value Ref Range    WBC Count 14.8 (H) 4.0 - 11.0 10e3/uL    RBC Count 2.46 (L) 3.80 - 5.20 10e6/uL    Hemoglobin 7.7 (L) 11.7 - 15.7 g/dL    Hematocrit 21.3 (L) 35.0 - 47.0 %    MCV 87 78 - 100 fL    MCH 31.3 26.5 - 33.0 pg    MCHC 36.2 31.5 - 36.5 g/dL    RDW 26.4 (H) 10.0 - 15.0 %    Platelet Count 390 150 - 450 10e3/uL    % Neutrophils 76 %    % Lymphocytes 12 %    % Monocytes 7 %    % Eosinophils 2 %    % Basophils 2 %    % Immature Granulocytes 1 %    NRBCs per  100 WBC 5 (H) <1 /100    Absolute Neutrophils 11.4 (H) 1.6 - 8.3 10e3/uL    Absolute Lymphocytes 1.8 0.8 - 5.3 10e3/uL    Absolute Monocytes 1.1 0.0 - 1.3 10e3/uL    Absolute Eosinophils 0.3 0.0 - 0.7 10e3/uL    Absolute Basophils 0.2 0.0 - 0.2 10e3/uL    Absolute Immature Granulocytes 0.1 <=0.4 10e3/uL    Absolute NRBCs 0.7 10e3/uL

## 2022-08-29 NOTE — TELEPHONE ENCOUNTER
PA Initiation    Medication: Siklos PA   Insurance Company: [employer] - Phone [phone] Fax [phone]  Pharmacy Filling the Rx: [facility] [place], [place] - [address] [place] [address] [phone]  Filling Pharmacy Phone:    Filling Pharmacy Fax:    Start Date: 8/29/2022    [email or web address]      Carole Ritchie  Oncology Pharmacy Liaison II  [email or web address]  Phone: [phone]  Fax: [phone]

## 2022-08-29 NOTE — NURSING NOTE
"Oncology Rooming Note    August 29, 2022 11:56 AM   Jennifer Cervantes is a 22 year old female who presents for:    Chief Complaint   Patient presents with     Port Draw     Labs drawn via port by RN. VS taken.     Oncology Clinic Visit     Sickle Cell Anemia      Initial Vitals: /89   Pulse 114   Temp 98.4  F (36.9  C) (Oral)   Resp 16   Wt 76 kg (167 lb 9.6 oz)   SpO2 97%   BMI 28.77 kg/m   Estimated body mass index is 28.77 kg/m  as calculated from the following:    Height as of 8/20/22: 1.626 m (5' 4\").    Weight as of this encounter: 76 kg (167 lb 9.6 oz). Body surface area is 1.85 meters squared.  Extreme Pain (8) Comment: Data Unavailable   No LMP recorded. Patient has had an injection.  Allergies reviewed: Yes  Medications reviewed: Yes    Medications: MEDICATION REFILLS NEEDED TODAY. Provider was notified.  Pharmacy name entered into Call[facility]: [facility] [place], [place] - [address] [place] [address] [phone]    Clinical concerns:        [place] CMA              "

## 2022-08-29 NOTE — NURSING NOTE
"Chief Complaint   Patient presents with     Port Draw     Labs drawn via port by RN. VS taken.     Port accessed with 20g 3/4\" power needle and labs drawn by rn.  Port flushed with NS and heparin.  Pt tolerated well.  VS taken.  Pt checked in for next appt.    Artie Morgan RN  "

## 2022-08-29 NOTE — PROGRESS NOTES
Infusion Nursing Note:  Jennifer Cervantes presents today for IVF/pain medication.    Patient seen by provider today: Yes: Dr. Duncan   present during visit today: Not Applicable.    Note: Patient endorses 8/10 lower back pain. No new complaints made.     Upon discharge patient had 3 doses of Morphine with PS 5/10. Patient agreed to go home. Patient verbalized knowledge on where and when to go if symptoms persists/worsen.    Intravenous Access:  Implanted Port.    Treatment Conditions:  Lab Results   Component Value Date    HGB 7.7 (L) 08/29/2022    WBC 14.8 (H) 08/29/2022    ANEU 10.2 (H) 06/26/2022    ANEUTAUTO 11.4 (H) 08/29/2022     08/29/2022      Lab Results   Component Value Date     08/29/2022    POTASSIUM 4.0 08/29/2022    MAG 2.0 11/11/2021    CR 0.55 08/29/2022    MICAH 8.5 08/29/2022    BILITOTAL 4.4 (H) 08/29/2022    ALBUMIN 3.9 08/29/2022    ALT 63 (H) 08/29/2022    AST 82 (H) 08/29/2022     Results reviewed, labs MET treatment parameters, ok to proceed with treatment.    Post Infusion Assessment:  Patient tolerated infusion without incident.  Blood return noted pre and post infusion.  Site patent and intact, free from redness, edema or discomfort.  No evidence of extravasations.  Access discontinued per protocol.     Discharge Plan:   Patient declined prescription refills.  Discharge instructions reviewed with: Patient.  Patient and/or family verbalized understanding of discharge instructions and all questions answered.  Copy of AVS reviewed with patient and/or family.  Patient will return 9/26/22 for next appointment.  Patient discharged in stable condition accompanied by: self.  Departure Mode: Ambulatory.      GLORIA YANCEY RN

## 2022-08-29 NOTE — PROGRESS NOTES
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 She had multiple thromboembolic events in 2021 despite adherent anticoagulation use (though warfarin was perpetually low) and there are concerns for chronic thromboembolic disease but did not have pulmonary HTN on a November 2021 cath. She is maintained on chronic PO opioids and twice-weekly infusion visits (since 1/24/22) but has been able to be maintained on this regimen and has stayed out of the ED most of the time with even rarer admissions.   on.    Interval History:  Jennifer is in clinic today alone. She continues to feel well and has an ambitious goal to be off regular opioids by here 23th birthday in March. She has remained out of the hospital with two ED visits in the last month, though one was for respiratory distress rather than pain (Nuc Med PE testing was negative, and she has an upcoming pulmonology appointment). She has continued to do infusions twice weekly and the plan is working. Ketamine is working for the ED and she had respectful care in both ED visits.      She is tolerating the Jadenu and is looking to restart Desferal too if possible.  She has not missed any doses of her aspirin.  She does want to look at getting her own place within the next year. She is on good terms with her mom and her mom thinks she is ready to live a bit more independently. She also has a cousin who is a manager at [employer] and she is hoping he can help coordinate a 's position for  "Jennifer.      Sickle Cell Disease Comprehensive Checklist    Bone Health/Avascular Necrosis Screening/Symptoms (each visit): no new concerns today    Leg Ulcer evaluation (every visit): none    Hypertension (every visit):stable, high normal    Last pulmonary evaluation (asthma, AMAN, pulm HTN): within last year( due again)    Stroke/silent cerebral infarct Hx (Y/N): Yes TIA ~2014, first event ~age 1 with full stroke and R sided weakness    Last PCP Visit: 8/2020    Vaccines:  ? PCV13: 5/13/19  ? Pneumovax (PPSV23): 3/04, 10/09, 7/12/19 (next due 7/2024)  ? Menactra: 4/2010, 9/2015 (MCV done 8/16/21)  ? Influenza: got 20-21 vaccine per self report    Audiology (chelation): done 6/2020, normal.. However, on 6/7, \"While there is no significant change in grade on the CTCAE4.03 Scale, there were hearing changes bilaterally for both standard and extended high frequency audiometry.  Will need to determine if an ENT consult is advised due to the asymmetry in extended high frequency testing.\"     Plan last reviewed with patient: 7/11/22    Patient background: 23 yo F, enjoys movies and kids though there are times where she does not really want to talk to people. Does not have a lot of social support at home.     Sickle Cell Disease History  Primary Hematologist Team: Jose Rafael Duncan  PCP: none  Genotype: SS  Acute Pain Crisis Treatment: (avoiding IV opioids for now, which she has agreed to)    ER   o Oxycodone 15-20 mg x1  o Ketamine 4mg IV x 2--helps with opioid sparing  o Toradol 15 mg IV x1   o Maintenance IV fluids with LR  o Other: Zofran 8 mg IV PRN nausea    Inpatient:  o Home oxycodone/Oxycontin regimen, though home oxycodone dosing could be increased to 20 mg to start  o PCA plan:   - None for now  o Other Medications: Zofran  o She has had success with ketamine starting at 4mg/h and advancing only to 6mg/h, as 8mg/h made her feel quite poorly..  o ASA  o Supportive Care: Docusate, Senna  Chronic Pain " Medications:    Home regimen Oxycontin 10 mg q12h, oxycodone 15 mg p.o. q.4-6 hours p.r.n. breakthrough pain.  She also continues on Voltaren gel, and Zoloft among other medications.    -Also benefits from mental health visits, acupuncture  Baseline Hemoglobin: 7 g/dl without chronic transfusions  Hydroxyurea use: Yes  H/O blood transfusions: Yes, several (iron overload) Most recent 11/20/2021    H/O Transfusion Reactions: no    Antibodies:none  H/O Acute Chest Syndrome: Yes    Last episode: 10/2019     ICU/intubation: unsure  H/O Stroke: Yes (managed with chronic transfusions in the past, stopped late Spring 2020)  H/O VTE: Yes (2/2021)  H/O Cholecystectomy or Splenectomy: no  H/O Asthma, Pulm HTN, AVN, Leg Ulcers, Nephropathy, Retinopathy, etc: Iron overload, asthma, chronic lung disease, physical limitations from early stroke    ---------------------------------------  Jennifer Cervantes's Goals (discussed 8/29/22)    1-3 month goal:      6 month goal:  Work on finding a job, Work on driving    12 month goal:  Move into her own place    Disease-specific goal(s):  Continue to stay out of the hospital, be off regular opioids around March of next year  ---------------------------------------      Current Outpatient Medications   Medication Sig Dispense Refill     acetaminophen (TYLENOL) 325 MG tablet Take 2 tablets (650 mg) by mouth every 6 hours as needed for mild pain 120 tablet 3     albuterol (PROVENTIL) (2.5 MG/3ML) 0.083% neb solution Take 1 vial (2.5 mg) by nebulization every 6 hours as needed for shortness of breath / dyspnea or wheezing 12 mL 4     albuterol (PROVENTIL) (2.5 MG/3ML) 0.083% neb solution Take 2 vials (5 mg) by nebulization every 6 hours as needed for shortness of breath / dyspnea or wheezing 90 mL 3     aspirin (ASA) 81 MG chewable tablet Take 1 tablet (81 mg) by mouth 2 times daily 60 tablet 11     budesonide-formoterol (SYMBICORT) 160-4.5 MCG/ACT Inhaler Inhale 2 puffs into the lungs 2 times  daily 10.2 g 3     deferasirox (JADENU) 360 MG tablet Take 4 tablets (1,440 mg) by mouth every evening 120 tablet 4     diphenhydrAMINE (BENADRYL) 25 MG capsule Take 1-2 capsules (25-50 mg) by mouth nightly as needed for sleep 60 capsule 3     EPINEPHrine (ANY BX GENERIC EQUIV) 0.3 MG/0.3ML injection 2-pack Inject 0.3 mLs (0.3 mg) into the muscle as needed for anaphylaxis (Patient not taking: No sig reported) 1 each 1     Hydroxyurea 1000 MG TABS Take 3,000 mg by mouth daily 90 tablet 3     ondansetron (ZOFRAN) 8 MG tablet Take 1 tablet (8 mg) by mouth every 8 hours as needed 30 tablet 1     oxyCODONE IR (ROXICODONE) 15 MG tablet Take 1 tablet (15 mg) by mouth every 4 hours as needed for severe pain Goal 4 per day. Max 6 per day. 45 tablet 0       Past Medical History  Past Medical History:   Diagnosis Date     Anxiety      Bleeding disorder (H)      Blood clotting disorder (H)      Cerebral infarction (H) 2015     Cognitive developmental delay     low IQ. Please recognize when managing pain and planning with her     Depressive disorder      Hemiplegia and hemiparesis following cerebral infarction affecting right dominant side (H)     right hand contractures     Iron overload due to repeated red blood cell transfusions      Migraines      Multiple subsegmental pulmonary emboli without acute cor pulmonale (H) 02/01/2021     Oppositional defiant behavior      Superficial venous thrombosis of arm, right 03/25/2021     Uncomplicated asthma      Past Surgical History:   Procedure Laterality Date     AS INSERT TUNNELED CV 2 CATH W/O PORT/PUMP       CHOLECYSTECTOMY       CV RIGHT HEART CATH MEASUREMENTS RECORDED N/A 11/18/2021    Procedure: Right Heart Cath;  Surgeon: Jackson Stauffer MD;  Location:  HEART CARDIAC CATH LAB     INSERT PORT VASCULAR ACCESS Left 4/21/2021    Procedure: INSERTION, VASCULAR ACCESS PORT (NOT SURE ON SIDE UNTIL REMOVAL);  Surgeon: Rajan More MD;  Location: [facility] OR     IR CHEST PORT  PLACEMENT > 5 YRS OF AGE  4/21/2021     IR CVC NON TUNNEL LINE REMOVAL  5/6/2021     IR CVC NON TUNNEL PLACEMENT  04/07/2020     IR CVC NON TUNNEL PLACEMENT  4/30/2021     IR PORT REMOVAL LEFT  4/21/2021     REMOVE PORT VASCULAR ACCESS Left 4/21/2021    Procedure: REMOVAL, VASCULAR ACCESS PORT LEFT;  Surgeon: Rajan More MD;  Location: [facility] OR     REPAIR TENDON ELBOW Right 10/02/2019    Procedure: Right Elbow Flexor Lengthening, Flexor Pronator Slide Of Wrist and Finger, Thumb Adductor Lengthening;  Surgeon: Anai Franco MD;  Location: [facility] OR     TONSILLECTOMY Bilateral 10/02/2019    Procedure: Bilateral Tonsillectomy;  Surgeon: Farhana Guy MD;  Location: [facility] OR     ZZC BREAST REDUCTION (INCLUDES LIPO) TIER 3 Bilateral 04/23/2019     Allergies   Allergen Reactions     Contrast Dye      Hives and breathing issues     Fish-Derived Products Hives     Seafood Hives     Diagnostic X-Ray Materials      Gadolinium      Social History   Social History     Tobacco Use     Smoking status: Never Smoker     Smokeless tobacco: Never Used   Substance Use Topics     Alcohol use: Not Currently     Alcohol/week: 0.0 standard drinks     Drug use: Never    Still living at home with mom and extended family.     Past medical history and social history were reviewed.    Physical Examination:  /89   Pulse 114   Temp 98.4  F (36.9  C) (Oral)   Resp 16   Wt 76 kg (167 lb 9.6 oz)   SpO2 97%   BMI 28.77 kg/m    Wt Readings from Last 10 Encounters:   08/29/22 76 kg (167 lb 9.6 oz)   08/20/22 77.6 kg (171 lb)   07/28/22 75.3 kg (166 lb)   07/15/22 77.1 kg (170 lb)   07/11/22 76.9 kg (169 lb 8 oz)   06/26/22 76.7 kg (169 lb)   06/20/22 76.8 kg (169 lb 6.4 oz)   06/06/22 76.7 kg (169 lb)   05/27/22 77.2 kg (170 lb 3.2 oz)   05/17/22 77.9 kg (171 lb 11.8 oz)     General: Reasonably well-appearing today consistent with her baseline. In good spirits  Eyes: EOMI, PERRL. No significant scleral icterus.  Skin: no  bruising noted on exposed skin. Port site c/d/i, not yet accessed  Respiratory:  Normal respiratory effort. Lungs clear to auscultation.  Cardiac: RRR, normal rhythm. No murmur.  Neurologic: Cranial nerves II through XII are grossly intact. Contractured right hand 2/2 stroke history, mild antalgic gait    Laboratory Data:   Latest Reference Range & Units 07/11/22 11:43   Sodium 133 - 144 mmol/L 139   Potassium 3.4 - 5.3 mmol/L 3.7   Chloride 94 - 109 mmol/L 111 (H)   Carbon Dioxide 20 - 32 mmol/L 20   Urea Nitrogen 7 - 30 mg/dL 6 (L)   Creatinine 0.52 - 1.04 mg/dL 0.47 (L)   GFR Estimate >60 mL/min/1.73m2 >90   Calcium 8.5 - 10.1 mg/dL 8.6   Anion Gap 3 - 14 mmol/L 8   Albumin 3.4 - 5.0 g/dL 4.2   Protein Total 6.8 - 8.8 g/dL 7.8   Alkaline Phosphatase 40 - 150 U/L 83   ALT 0 - 50 U/L 42   AST 0 - 45 U/L 63 (H)   Bilirubin Total 0.2 - 1.3 mg/dL 4.4 (H)   Ferritin 12 - 150 ng/mL 5,623 (H)   Hemoglobin A 95.0 - 97.9 % 0.0 (L)   Hemoglobin A2 2.0 - 3.5 % 3.3   Hemoglobin C 0.0 - 0.0 % 0.0   Hemoglobin E 0.0 - 0.0 % 0.0   Hemoglobin F 0.0 - 2.1 % 4.8 (H)   Hemoglobin S 0.0 - 0.0 % 90.6 (H)   Hemoglobin Other 0.0 - 0.0 % 1.3 (H)   Glucose 70 - 99 mg/dL 88   WBC 4.0 - 11.0 10e3/uL 10.5   Hemoglobin 11.7 - 15.7 g/dL 7.3 (L)   Hematocrit 35.0 - 47.0 % 20.9 (L)   Platelet Count 150 - 450 10e3/uL 487 (H)   RBC Count 3.80 - 5.20 10e6/uL 2.41 (L)   MCV 78 - 100 fL 87   MCH 26.5 - 33.0 pg 30.3   MCHC 31.5 - 36.5 g/dL 34.9   RDW 10.0 - 15.0 % 25.3 (H)   % Neutrophils % 68   % Lymphocytes % 20   % Monocytes % 7   % Eosinophils % 3   % Basophils % 1   Absolute Basophils 0.0 - 0.2 10e3/uL 0.1   Absolute Eosinophils 0.0 - 0.7 10e3/uL 0.3   Absolute Immature Granulocytes <=0.4 10e3/uL 0.1   Absolute Lymphocytes 0.8 - 5.3 10e3/uL 2.1   Absolute Monocytes 0.0 - 1.3 10e3/uL 0.7   % Immature Granulocytes % 1   Absolute Neutrophils 1.6 - 8.3 10e3/uL 7.2   Absolute NRBCs 10e3/uL 0.5   NRBCs per 100 WBC <1 /100 5 (H)   % Retic 0.5 - 2.0 %  20.1 (H)   Absolute Retic 0.025 - 0.095 10e6/uL 0.495 (H)   (H): Data is abnormally high  (L): Data is abnormally low    Most recent labs reviewed and interpreted by me today.      Assessment and Plan:  1. Sickle Cell HgbSS Disease  2. Chronic Pain  3. Iron overload  4. 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 History of CVA  6. Hearing loss    Overall Jennifer has been doing well with pain management and pain management has been better over the past few months. We have been able to cut down significantly on ED visits, with and her last admission was 1/29/22 and even two ED visits last month was a bit surprising. She is of the mind that she wants to drop opioids more significantly so we will wean every few weeks to have her ideally to PRN opioids next spring or early summer. If we can do that, I don't think we would need the Suboxone, through we did talk about it as a plan B. We will plan on weaning oxycodone further in 1-2 months (September?)     We will start back on Desferal in addition to the Jadenu. Ferritin stable but she is due for repeat Ferriscan.    I do support her efforts to live independently and will help out as  Ican    Plan:  -Continue Hydrea to 3000mg daily to help lessen frequency of sickle cell pain  -Continue Oxycodone at home but decreasing Rx to 40 tabs/week. She can self-reduce infusion days/week but I will keep the cap at 2/week for now  -Infusion center visits limited to two times per week. Continue diligent home management with current medications, heat, rest, compression, warm baths.   -If unable to manage at home can go to ED but continue to not do IV narcotics in the emergency room. Ketamine previously added to pain plan in ED  -Restart deferoxamine in addition to deferasirox. Ferriscan ordered for October.  -Continue aspirin BID.   -RTC in ~4 weeks .    43 minutes spent on the date of the encounter doing chart review, review of test results, patient  visit and documentation         Eric Duncan MD  Hematologist  Division of Hematology, Oncology, and Transplantation  [facility]  [facility]  Pager: [phone]

## 2022-08-30 ENCOUNTER — VIRTUAL VISIT (OUTPATIENT)
Dept: PHARMACY | Facility: CLINIC | Age: 22
End: 2022-08-30
Payer: COMMERCIAL

## 2022-08-30 DIAGNOSIS — I26.99 PULMONARY EMBOLISM, OTHER, UNSPECIFIED CHRONICITY, UNSPECIFIED WHETHER ACUTE COR PULMONALE PRESENT (H): Primary | ICD-10-CM

## 2022-08-30 DIAGNOSIS — D57.00 SICKLE CELL CRISIS (H): ICD-10-CM

## 2022-08-30 DIAGNOSIS — R11.0 NAUSEA: ICD-10-CM

## 2022-08-30 DIAGNOSIS — R06.02 SOB (SHORTNESS OF BREATH): ICD-10-CM

## 2022-08-30 DIAGNOSIS — E83.111 IRON OVERLOAD DUE TO REPEATED RED BLOOD CELL TRANSFUSIONS: ICD-10-CM

## 2022-08-30 PROCEDURE — 99605 MTMS BY PHARM NP 15 MIN: CPT | Performed by: PHARMACIST

## 2022-08-30 NOTE — PROGRESS NOTES
"Medication Therapy Management (MTM) Encounter    ASSESSMENT:                            Medication Adherence/Access: No issues identified    Sickle Cell Pain: Stable.     Nausea: Stable.     Asthma: Stable.     Iron overload: Plan in place with oncology.    VTE: Stable.     PLAN:                            1. Continue medications as prescribed.    Follow-up: as needed    SUBJECTIVE/OBJECTIVE:                          Jennifer Cervantes is a 22 year old female called for an initial visit. She was referred to me from [facility]. Patient was not an active participant in the visit and would mostly answer with minimal responses.      Reason for visit: CMR - Recruitment Patient.    Allergies/ADRs: Reviewed in chart  Past Medical History: Reviewed in chart  Tobacco: She reports that she has never smoked. She has never used smokeless tobacco.  Alcohol: none      Medication Adherence/Access: no issues reported    Sickle Cell Pain: Currently taking acetaminophen 325 mg as needed (reports once per day), oxycodone 15 mg every 4 hours as needed. Goal 4 per day. Is taking every 4 hours. Didn't give an average. \"as it says on the bottle.\" Also on hydroxyurea 3000 mg daily.     Nausea: Currently taking ondansetron 8 mg as needed. Doesn't need often.     Asthma: Currently taking albuterol nebulizer as needed, and Symbicort inhaler 2 puffs twice a day. Does rinse mouth out after use. Doesn't have to use albuterol often. Reports breathing is \"fine.\"    Iron overload: Currently taking defasirox 1440 mg daily. No concerns or questions at this time.     VTE: Currently taking aspirin 81 mg twice daily. No issues with bleeding. Replaced Eliquis.     Today's Vitals: There were no vitals taken for this visit.  ----------------      I spent 6 minutes with this patient today. All changes were made via collaborative practice agreement with Suraj Case MD. A copy of the visit note was provided to the patient's provider(s).      Post " Medication Reconciliation Status: Discharge medications reconciled, continue medications without change        The patient declined a summary of these recommendations.     Ifeanyi Garg, Pharm. D., BCACP  Medication Therapy Management Pharmacist  Direct Voicemail: [phone]      Telemedicine Visit Details  Type of service:  Telephone visit  Start Time: 9:32 am  End Time: 9:38 am  Originating Location (patient location): [place]  Distant Location (provider location):  [facility]     Medication Therapy Recommendations  No medication therapy recommendations to display

## 2022-08-31 ENCOUNTER — TELEPHONE (OUTPATIENT)
Dept: INTERNAL MEDICINE | Facility: CLINIC | Age: 22
End: 2022-08-31

## 2022-08-31 DIAGNOSIS — Z30.42 DEPO-PROVERA CONTRACEPTIVE STATUS: Primary | ICD-10-CM

## 2022-08-31 LAB
HGB A1 MFR BLD: 0 %
HGB A2 MFR BLD: 3.7 %
HGB C MFR BLD: 0 %
HGB E MFR BLD: 0 %
HGB F MFR BLD: 3.2 %
HGB FRACT BLD ELPH-IMP: ABNORMAL
HGB OTHER MFR BLD: 2 %
HGB S BLD QL SOLY: ABNORMAL
HGB S MFR BLD: 91.1 %
PATH INTERP BLD-IMP: ABNORMAL

## 2022-08-31 NOTE — TELEPHONE ENCOUNTER
[facility] Call Center    Phone Message    May a detailed message be left on voicemail: yes     Reason for Call: Other: Pt requesting call back to verify when her last depo shot was

## 2022-08-31 NOTE — TELEPHONE ENCOUNTER
Depo was last given on 5/16/22. Schedule reflects time period to get next shot is 8/1-15/22. Pt will need to get a pregnancy test prior to getting next depo.       Spoke to patient to relay information above, pt states verbal understanding and will get lab (UA) prior to depo shot. Nurse visit was scheduled. Lab ordered and lab appt scheduled. Charlene Sims LPN 8/31/2022 1:22 PM

## 2022-09-01 ENCOUNTER — PATIENT OUTREACH (OUTPATIENT)
Dept: ONCOLOGY | Facility: CLINIC | Age: 22
End: 2022-09-01

## 2022-09-01 ENCOUNTER — HOME INFUSION (PRE-WILLOW HOME INFUSION) (OUTPATIENT)
Dept: PHARMACY | Facility: CLINIC | Age: 22
End: 2022-09-01

## 2022-09-01 NOTE — PROGRESS NOTES
Pt called in to triage requesting IVF pain meds for 10/10 back x1 day. Stated last took prn Oxy 15 mg at 6am this morning without relief. Denied any fevers, chills, cough, sob, chest pain or other symptoms. Pt's last infusion was 8/29, last clinic visit 8/29 with follow-up on 9/26 with Dr. Duncan. Pt denied being out of home medications and needing any refills today, she will  Oxy filled yesterday at [facility] pharmacy.    Pt will need round trip ride assistance through  if appointment opens up today, pt on wait list.  Pt qualifies for sickle cell standing order protocol.

## 2022-09-01 NOTE — PROGRESS NOTES
This is a recent snapshot of the patient's [facility] medical record.  For current drug dose and complete information and questions, call [phone] or In Basket pool, fv home infusion ([id number])  CSN Number:  [id number]

## 2022-09-02 ENCOUNTER — HOME INFUSION (PRE-WILLOW HOME INFUSION) (OUTPATIENT)
Dept: PHARMACY | Facility: CLINIC | Age: 22
End: 2022-09-02

## 2022-09-02 ENCOUNTER — TELEPHONE (OUTPATIENT)
Dept: ONCOLOGY | Facility: CLINIC | Age: 22
End: 2022-09-02

## 2022-09-02 NOTE — TELEPHONE ENCOUNTER
Pt called in to triage requesting IVF pain meds for Arms and back, legs pain rated 9/10 x 2 days. Stated last took prn Oxycodone at 6 am this morning without relief. Denied any fevers, chills, cough, sob, chest pain, numbness or tingling or other symptoms not typical of sickle cell pain.   Pt's last infusion was 8/29/22, last clinic visit 8/29/22 Dr Duncan  with follow-up on 9/26/22 with Dr Duncan .   Patient states they do not have own ride and it will take 60 minutes long to get to cancer clinic.   Pt denied being out of home medications and needing any refills today.   Pt qualifies for sickle cell standing order protocol.  If you do not hear from the infusion center by 2pm then you will not be able to get in for an infusion today.   Please note, if you are late for your appt, you risk losing your infusion appt as it may delay another patient's infusion who arrived on time.

## 2022-09-02 NOTE — TELEPHONE ENCOUNTER
"Jennifer Cervantes is calling to let team know, pt is done with home care so can come at anytime for IVF/Pain appt when available.  Would still need transportation, but if a \"last minute\" appt opens up, would work on figure out a way to get to clinic.  "

## 2022-09-04 ENCOUNTER — NURSE TRIAGE (OUTPATIENT)
Dept: NURSING | Facility: CLINIC | Age: 22
End: 2022-09-04

## 2022-09-05 ENCOUNTER — APPOINTMENT (OUTPATIENT)
Dept: ULTRASOUND IMAGING | Facility: CLINIC | Age: 22
DRG: 811 | End: 2022-09-05
Attending: EMERGENCY MEDICINE
Payer: COMMERCIAL

## 2022-09-05 ENCOUNTER — APPOINTMENT (OUTPATIENT)
Dept: GENERAL RADIOLOGY | Facility: CLINIC | Age: 22
DRG: 811 | End: 2022-09-05
Attending: EMERGENCY MEDICINE
Payer: COMMERCIAL

## 2022-09-05 ENCOUNTER — HOSPITAL ENCOUNTER (INPATIENT)
Facility: CLINIC | Age: 22
LOS: 8 days | Discharge: HOME | DRG: 811 | End: 2022-09-13
Attending: EMERGENCY MEDICINE | Admitting: STUDENT IN AN ORGANIZED HEALTH CARE EDUCATION/TRAINING PROGRAM
Payer: COMMERCIAL

## 2022-09-05 ENCOUNTER — HOSPITAL ENCOUNTER (EMERGENCY)
Facility: CLINIC | Age: 22
Discharge: HOME | End: 2022-09-05
Attending: EMERGENCY MEDICINE | Admitting: EMERGENCY MEDICINE
Payer: COMMERCIAL

## 2022-09-05 VITALS
SYSTOLIC BLOOD PRESSURE: 142 MMHG | BODY MASS INDEX: 28.61 KG/M2 | HEIGHT: 64 IN | TEMPERATURE: 98.4 F | DIASTOLIC BLOOD PRESSURE: 97 MMHG | RESPIRATION RATE: 33 BRPM | WEIGHT: 167.6 LBS | HEART RATE: 111 BPM | OXYGEN SATURATION: 98 %

## 2022-09-05 DIAGNOSIS — D57.00 SICKLE CELL PAIN CRISIS (H): ICD-10-CM

## 2022-09-05 DIAGNOSIS — Z20.822 LAB TEST NEGATIVE FOR COVID-19 VIRUS: ICD-10-CM

## 2022-09-05 DIAGNOSIS — R09.02 HYPOXIA: Primary | ICD-10-CM

## 2022-09-05 DIAGNOSIS — D57.01 ACUTE CHEST SYNDROME (H): ICD-10-CM

## 2022-09-05 LAB
ALBUMIN SERPL BCG-MCNC: 4.8 G/DL (ref 3.5–5.2)
ALBUMIN SERPL BCG-MCNC: 4.8 G/DL (ref 3.5–5.2)
ALP SERPL-CCNC: 103 U/L (ref 35–104)
ALP SERPL-CCNC: 94 U/L (ref 35–104)
ALT SERPL W P-5'-P-CCNC: 55 U/L (ref 10–35)
ALT SERPL W P-5'-P-CCNC: 66 U/L (ref 10–35)
ANION GAP SERPL CALCULATED.3IONS-SCNC: 12 MMOL/L (ref 7–15)
ANION GAP SERPL CALCULATED.3IONS-SCNC: 12 MMOL/L (ref 7–15)
AST SERPL W P-5'-P-CCNC: 91 U/L (ref 10–35)
AST SERPL W P-5'-P-CCNC: 94 U/L (ref 10–35)
BASOPHILS # BLD AUTO: 0.1 10E3/UL (ref 0–0.2)
BASOPHILS # BLD AUTO: 0.3 10E3/UL (ref 0–0.2)
BASOPHILS NFR BLD AUTO: 0 %
BASOPHILS NFR BLD AUTO: 1 %
BILIRUB SERPL-MCNC: 3.9 MG/DL
BILIRUB SERPL-MCNC: 4.7 MG/DL
BLD PROD TYP BPU: NORMAL
BLOOD COMPONENT TYPE: NORMAL
BUN SERPL-MCNC: 4.8 MG/DL (ref 6–20)
BUN SERPL-MCNC: 6.9 MG/DL (ref 6–20)
CALCIUM SERPL-MCNC: 9.3 MG/DL (ref 8.6–10)
CALCIUM SERPL-MCNC: 9.6 MG/DL (ref 8.6–10)
CHLORIDE SERPL-SCNC: 103 MMOL/L (ref 98–107)
CHLORIDE SERPL-SCNC: 104 MMOL/L (ref 98–107)
CODING SYSTEM: NORMAL
CREAT SERPL-MCNC: 0.59 MG/DL (ref 0.51–0.95)
CREAT SERPL-MCNC: 0.66 MG/DL (ref 0.51–0.95)
CROSSMATCH: NORMAL
D DIMER PPP FEU-MCNC: 10.44 UG/ML FEU (ref 0–0.5)
DEPRECATED HCO3 PLAS-SCNC: 19 MMOL/L (ref 22–29)
DEPRECATED HCO3 PLAS-SCNC: 19 MMOL/L (ref 22–29)
EOSINOPHIL # BLD AUTO: 0 10E3/UL (ref 0–0.7)
EOSINOPHIL # BLD AUTO: 0.2 10E3/UL (ref 0–0.7)
EOSINOPHIL NFR BLD AUTO: 0 %
EOSINOPHIL NFR BLD AUTO: 1 %
ERYTHROCYTE [DISTWIDTH] IN BLOOD BY AUTOMATED COUNT: 25.2 % (ref 10–15)
ERYTHROCYTE [DISTWIDTH] IN BLOOD BY AUTOMATED COUNT: 25.3 % (ref 10–15)
FLUAV RNA SPEC QL NAA+PROBE: NEGATIVE
FLUBV RNA RESP QL NAA+PROBE: NEGATIVE
GFR SERPL CREATININE-BSD FRML MDRD: >90 ML/MIN/1.73M2
GFR SERPL CREATININE-BSD FRML MDRD: >90 ML/MIN/1.73M2
GLUCOSE SERPL-MCNC: 130 MG/DL (ref 70–99)
GLUCOSE SERPL-MCNC: 136 MG/DL (ref 70–99)
HCG SERPL QL: NEGATIVE
HCT VFR BLD AUTO: 20.5 % (ref 35–47)
HCT VFR BLD AUTO: 21.3 % (ref 35–47)
HGB BLD-MCNC: 7.5 G/DL (ref 11.7–15.7)
HGB BLD-MCNC: 7.7 G/DL (ref 11.7–15.7)
HOLD SPECIMEN: NORMAL
IMM GRANULOCYTES # BLD: 0.3 10E3/UL
IMM GRANULOCYTES # BLD: 0.3 10E3/UL
IMM GRANULOCYTES NFR BLD: 1 %
IMM GRANULOCYTES NFR BLD: 2 %
ISSUE DATE AND TIME: NORMAL
ISSUE DATE AND TIME: NORMAL
LACTATE SERPL-SCNC: 0.6 MMOL/L (ref 0.7–2)
LYMPHOCYTES # BLD AUTO: 1.4 10E3/UL (ref 0.8–5.3)
LYMPHOCYTES # BLD AUTO: 2.8 10E3/UL (ref 0.8–5.3)
LYMPHOCYTES NFR BLD AUTO: 14 %
LYMPHOCYTES NFR BLD AUTO: 5 %
MCH RBC QN AUTO: 31 PG (ref 26.5–33)
MCH RBC QN AUTO: 32 PG (ref 26.5–33)
MCHC RBC AUTO-ENTMCNC: 36.2 G/DL (ref 31.5–36.5)
MCHC RBC AUTO-ENTMCNC: 36.6 G/DL (ref 31.5–36.5)
MCV RBC AUTO: 85 FL (ref 78–100)
MCV RBC AUTO: 88 FL (ref 78–100)
MONOCYTES # BLD AUTO: 1.5 10E3/UL (ref 0–1.3)
MONOCYTES # BLD AUTO: 1.5 10E3/UL (ref 0–1.3)
MONOCYTES NFR BLD AUTO: 6 %
MONOCYTES NFR BLD AUTO: 7 %
NEUTROPHILS # BLD AUTO: 15.1 10E3/UL (ref 1.6–8.3)
NEUTROPHILS # BLD AUTO: 23.4 10E3/UL (ref 1.6–8.3)
NEUTROPHILS NFR BLD AUTO: 75 %
NEUTROPHILS NFR BLD AUTO: 88 %
NRBC # BLD AUTO: 0.9 10E3/UL
NRBC # BLD AUTO: 1.1 10E3/UL
NRBC BLD AUTO-RTO: 4 /100
NRBC BLD AUTO-RTO: 5 /100
PLATELET # BLD AUTO: 282 10E3/UL (ref 150–450)
PLATELET # BLD AUTO: 339 10E3/UL (ref 150–450)
POTASSIUM SERPL-SCNC: 4 MMOL/L (ref 3.4–5.3)
POTASSIUM SERPL-SCNC: 4.1 MMOL/L (ref 3.4–5.3)
PROCALCITONIN SERPL IA-MCNC: 0.23 NG/ML
PROT SERPL-MCNC: 8.1 G/DL (ref 6.4–8.3)
PROT SERPL-MCNC: 8.3 G/DL (ref 6.4–8.3)
RBC # BLD AUTO: 2.41 10E6/UL (ref 3.8–5.2)
RBC # BLD AUTO: 2.42 10E6/UL (ref 3.8–5.2)
RETICS # AUTO: 0.43 10E6/UL (ref 0.03–0.1)
RETICS # AUTO: 0.54 10E6/UL (ref 0.03–0.1)
RETICS/RBC NFR AUTO: 17.7 % (ref 0.5–2)
RETICS/RBC NFR AUTO: 22.2 % (ref 0.5–2)
RSV RNA SPEC NAA+PROBE: NEGATIVE
SARS-COV-2 RNA RESP QL NAA+PROBE: NEGATIVE
SODIUM SERPL-SCNC: 134 MMOL/L (ref 136–145)
SODIUM SERPL-SCNC: 135 MMOL/L (ref 136–145)
TROPONIN T SERPL HS-MCNC: <6 NG/L
UNIT ABO/RH: NORMAL
UNIT NUMBER: NORMAL
UNIT STATUS: NORMAL
UNIT TYPE ISBT: 9500
WBC # BLD AUTO: 20.1 10E3/UL (ref 4–11)
WBC # BLD AUTO: 26.7 10E3/UL (ref 4–11)

## 2022-09-05 PROCEDURE — 85025 COMPLETE CBC W/AUTO DIFF WBC: CPT | Performed by: EMERGENCY MEDICINE

## 2022-09-05 PROCEDURE — 87637 SARSCOV2&INF A&B&RSV AMP PRB: CPT | Performed by: EMERGENCY MEDICINE

## 2022-09-05 PROCEDURE — 96365 THER/PROPH/DIAG IV INF INIT: CPT | Performed by: EMERGENCY MEDICINE

## 2022-09-05 PROCEDURE — 120N000002 HC R&B MED SURG/OB UMMC

## 2022-09-05 PROCEDURE — 86901 BLOOD TYPING SEROLOGIC RH(D): CPT | Performed by: EMERGENCY MEDICINE

## 2022-09-05 PROCEDURE — 96374 THER/PROPH/DIAG INJ IV PUSH: CPT | Performed by: EMERGENCY MEDICINE

## 2022-09-05 PROCEDURE — 250N000011 HC RX IP 250 OP 636: Performed by: EMERGENCY MEDICINE

## 2022-09-05 PROCEDURE — 85045 AUTOMATED RETICULOCYTE COUNT: CPT | Performed by: EMERGENCY MEDICINE

## 2022-09-05 PROCEDURE — 83605 ASSAY OF LACTIC ACID: CPT | Performed by: EMERGENCY MEDICINE

## 2022-09-05 PROCEDURE — 87077 CULTURE AEROBIC IDENTIFY: CPT | Performed by: EMERGENCY MEDICINE

## 2022-09-05 PROCEDURE — 96375 TX/PRO/DX INJ NEW DRUG ADDON: CPT | Performed by: EMERGENCY MEDICINE

## 2022-09-05 PROCEDURE — 250N000013 HC RX MED GY IP 250 OP 250 PS 637: Performed by: EMERGENCY MEDICINE

## 2022-09-05 PROCEDURE — 84484 ASSAY OF TROPONIN QUANT: CPT | Performed by: EMERGENCY MEDICINE

## 2022-09-05 PROCEDURE — 99285 EMERGENCY DEPT VISIT HI MDM: CPT | Performed by: EMERGENCY MEDICINE

## 2022-09-05 PROCEDURE — 250N000009 HC RX 250: Performed by: EMERGENCY MEDICINE

## 2022-09-05 PROCEDURE — 87149 DNA/RNA DIRECT PROBE: CPT | Performed by: EMERGENCY MEDICINE

## 2022-09-05 PROCEDURE — 84145 PROCALCITONIN (PCT): CPT | Performed by: EMERGENCY MEDICINE

## 2022-09-05 PROCEDURE — 96361 HYDRATE IV INFUSION ADD-ON: CPT | Performed by: EMERGENCY MEDICINE

## 2022-09-05 PROCEDURE — 36415 COLL VENOUS BLD VENIPUNCTURE: CPT | Performed by: EMERGENCY MEDICINE

## 2022-09-05 PROCEDURE — 84155 ASSAY OF PROTEIN SERUM: CPT | Performed by: EMERGENCY MEDICINE

## 2022-09-05 PROCEDURE — 99285 EMERGENCY DEPT VISIT HI MDM: CPT | Mod: 25 | Performed by: EMERGENCY MEDICINE

## 2022-09-05 PROCEDURE — 71045 X-RAY EXAM CHEST 1 VIEW: CPT | Mod: 26 | Performed by: RADIOLOGY

## 2022-09-05 PROCEDURE — 85379 FIBRIN DEGRADATION QUANT: CPT | Performed by: EMERGENCY MEDICINE

## 2022-09-05 PROCEDURE — 84450 TRANSFERASE (AST) (SGOT): CPT | Performed by: EMERGENCY MEDICINE

## 2022-09-05 PROCEDURE — 93970 EXTREMITY STUDY: CPT

## 2022-09-05 PROCEDURE — 86850 RBC ANTIBODY SCREEN: CPT | Performed by: EMERGENCY MEDICINE

## 2022-09-05 PROCEDURE — 93005 ELECTROCARDIOGRAM TRACING: CPT | Performed by: EMERGENCY MEDICINE

## 2022-09-05 PROCEDURE — 86923 COMPATIBILITY TEST ELECTRIC: CPT | Performed by: EMERGENCY MEDICINE

## 2022-09-05 PROCEDURE — 93970 EXTREMITY STUDY: CPT | Mod: 26 | Performed by: STUDENT IN AN ORGANIZED HEALTH CARE EDUCATION/TRAINING PROGRAM

## 2022-09-05 PROCEDURE — 84703 CHORIONIC GONADOTROPIN ASSAY: CPT | Performed by: EMERGENCY MEDICINE

## 2022-09-05 PROCEDURE — 96376 TX/PRO/DX INJ SAME DRUG ADON: CPT | Performed by: EMERGENCY MEDICINE

## 2022-09-05 PROCEDURE — 93010 ELECTROCARDIOGRAM REPORT: CPT | Performed by: EMERGENCY MEDICINE

## 2022-09-05 PROCEDURE — 258N000003 HC RX IP 258 OP 636: Performed by: EMERGENCY MEDICINE

## 2022-09-05 PROCEDURE — 86923 COMPATIBILITY TEST ELECTRIC: CPT | Performed by: PHYSICIAN ASSISTANT

## 2022-09-05 PROCEDURE — 71045 X-RAY EXAM CHEST 1 VIEW: CPT

## 2022-09-05 PROCEDURE — C9803 HOPD COVID-19 SPEC COLLECT: HCPCS | Performed by: EMERGENCY MEDICINE

## 2022-09-05 PROCEDURE — 80053 COMPREHEN METABOLIC PANEL: CPT | Performed by: EMERGENCY MEDICINE

## 2022-09-05 RX ORDER — SODIUM CHLORIDE, SODIUM LACTATE, POTASSIUM CHLORIDE, CALCIUM CHLORIDE 600; 310; 30; 20 MG/100ML; MG/100ML; MG/100ML; MG/100ML
INJECTION, SOLUTION INTRAVENOUS CONTINUOUS
Status: DISCONTINUED | OUTPATIENT
Start: 2022-09-05 | End: 2022-09-05 | Stop reason: HOSPADM

## 2022-09-05 RX ORDER — HEPARIN SODIUM,PORCINE 10 UNIT/ML
5-10 VIAL (ML) INTRAVENOUS
Status: DISCONTINUED | OUTPATIENT
Start: 2022-09-05 | End: 2022-09-05 | Stop reason: HOSPADM

## 2022-09-05 RX ORDER — PIPERACILLIN SODIUM, TAZOBACTAM SODIUM 4; .5 G/20ML; G/20ML
4.5 INJECTION, POWDER, LYOPHILIZED, FOR SOLUTION INTRAVENOUS ONCE
Status: COMPLETED | OUTPATIENT
Start: 2022-09-05 | End: 2022-09-05

## 2022-09-05 RX ORDER — HEPARIN SODIUM,PORCINE 10 UNIT/ML
5-10 VIAL (ML) INTRAVENOUS EVERY 24 HOURS
Status: DISCONTINUED | OUTPATIENT
Start: 2022-09-05 | End: 2022-09-05 | Stop reason: HOSPADM

## 2022-09-05 RX ORDER — SODIUM CHLORIDE 9 MG/ML
INJECTION, SOLUTION INTRAVENOUS CONTINUOUS
Status: DISCONTINUED | OUTPATIENT
Start: 2022-09-05 | End: 2022-09-08

## 2022-09-05 RX ORDER — HEPARIN SODIUM (PORCINE) LOCK FLUSH IV SOLN 100 UNIT/ML 100 UNIT/ML
5-10 SOLUTION INTRAVENOUS
Status: DISCONTINUED | OUTPATIENT
Start: 2022-09-05 | End: 2022-09-05 | Stop reason: HOSPADM

## 2022-09-05 RX ORDER — HEPARIN SODIUM 10000 [USP'U]/100ML
0-5000 INJECTION, SOLUTION INTRAVENOUS CONTINUOUS
Status: DISCONTINUED | OUTPATIENT
Start: 2022-09-05 | End: 2022-09-09

## 2022-09-05 RX ORDER — KETOROLAC TROMETHAMINE 15 MG/ML
15 INJECTION, SOLUTION INTRAMUSCULAR; INTRAVENOUS ONCE
Status: COMPLETED | OUTPATIENT
Start: 2022-09-05 | End: 2022-09-05

## 2022-09-05 RX ADMIN — SODIUM CHLORIDE 1000 ML: 9 INJECTION, SOLUTION INTRAVENOUS at 21:41

## 2022-09-05 RX ADMIN — OXYCODONE HYDROCHLORIDE 15 MG: 5 TABLET ORAL at 07:40

## 2022-09-05 RX ADMIN — HYDROMORPHONE HYDROCHLORIDE 1 MG: 1 INJECTION, SOLUTION INTRAMUSCULAR; INTRAVENOUS; SUBCUTANEOUS at 22:53

## 2022-09-05 RX ADMIN — HYDROMORPHONE HYDROCHLORIDE 1 MG: 1 INJECTION, SOLUTION INTRAMUSCULAR; INTRAVENOUS; SUBCUTANEOUS at 21:41

## 2022-09-05 RX ADMIN — PIPERACILLIN AND TAZOBACTAM 4.5 G: 4; .5 INJECTION, POWDER, LYOPHILIZED, FOR SOLUTION INTRAVENOUS at 23:25

## 2022-09-05 RX ADMIN — SODIUM CHLORIDE, POTASSIUM CHLORIDE, SODIUM LACTATE AND CALCIUM CHLORIDE: 600; 310; 30; 20 INJECTION, SOLUTION INTRAVENOUS at 07:39

## 2022-09-05 RX ADMIN — SODIUM CHLORIDE: 9 INJECTION, SOLUTION INTRAVENOUS at 22:53

## 2022-09-05 RX ADMIN — HEPARIN 5 ML: 100 SYRINGE at 11:46

## 2022-09-05 RX ADMIN — KETOROLAC TROMETHAMINE 15 MG: 15 INJECTION, SOLUTION INTRAMUSCULAR; INTRAVENOUS at 07:39

## 2022-09-05 RX ADMIN — Medication 4 MG: at 07:39

## 2022-09-05 RX ADMIN — Medication 4 MG: at 10:05

## 2022-09-05 ASSESSMENT — ENCOUNTER SYMPTOMS
POLYDIPSIA: 0
BACK PAIN: 1
ABDOMINAL PAIN: 0
NECK STIFFNESS: 0
FEVER: 0
LIGHT-HEADEDNESS: 0
ARTHRALGIAS: 0
VOMITING: 0
NAUSEA: 0
CONFUSION: 0
HEADACHES: 0
SHORTNESS OF BREATH: 0
COLOR CHANGE: 0
COUGH: 0
CHILLS: 0
NECK PAIN: 0
BRUISES/BLEEDS EASILY: 0
DIFFICULTY URINATING: 0
ADENOPATHY: 0
EYE REDNESS: 0

## 2022-09-05 ASSESSMENT — ACTIVITIES OF DAILY LIVING (ADL)
ADLS_ACUITY_SCORE: 35
ADLS_ACUITY_SCORE: 33

## 2022-09-05 NOTE — TELEPHONE ENCOUNTER
Pt states she has sickle cell and she has severe pain in back and chest under both breasts for 3--4 hours. States she is SOB because she cannot take deep breaths due to pain in chest and back. Rates pain level 8 1/2 out of 10. Took oxycodone and ibuprofen 1 1/2 hr ago w/ no relief. Pain remains 8 1/2 out of 10. Advised ED now. Pt voiced understanding and agreement.       Reason for Disposition    MODERATE difficulty breathing (e.g., speaks in phrases, SOB even at rest, pulse 100-120)    Additional Information    Negative: SEVERE difficulty breathing (e.g., struggling for each breath, retractions, cyanosis, speaks in single words, pulse > 120)    Negative: Shock suspected (e.g., cold/pale/clammy skin, too weak to stand, low BP, rapid pulse)    Negative: [1] Chest pain lasts > 5 minutes AND [2] history of heart disease (i.e., heart attack, bypass surgery, angina, angioplasty, CHF; not just a heart murmur)    Negative: [1] Chest pain that lasts > 5 minutes AND [2] described as crushing, pressure-like, or heavy    Negative: Sounds like a life-threatening emergency to the triager    Negative: Pain that follows an injury    Negative: Pain is not typical of patient's previous SCD acute pain attacks    Negative: [1] SEVERE abdominal pain AND [2] present > 1 hour    Protocols used: SICKLE CELL DISEASE - ACUTE PAIN EPISODE (CRISIS)-A-

## 2022-09-05 NOTE — ED TRIAGE NOTES
Jennifer was BIBA today for evaluation of back pain that she attributes to sickle cell disease.  EMS administered intranasal Fentanyl without any improvement of pain.     Triage Assessment     Row Name 09/05/22 0506       Triage Assessment (Adult)    Airway WDL WDL       Respiratory WDL    Respiratory WDL WDL       Skin Circulation/Temperature WDL    Skin Circulation/Temperature WDL WDL       Cardiac WDL    Cardiac WDL WDL       Peripheral/Neurovascular WDL    Peripheral Neurovascular WDL WDL       Cognitive/Neuro/Behavioral WDL    Cognitive/Neuro/Behavioral WDL WDL

## 2022-09-05 NOTE — DISCHARGE INSTRUCTIONS
Please make an appointment to follow up with Hematology Oncology Clinic (phone: [phone]) in 1-2 days for further evaluation and recommendations.  Please take your medications that you have at home for pain.  If your symptoms or not well controlled with your home medications please come back to the emergency department.     [[id number]] : 2 [FreeTextEntry1] : Discussed the need to dc screens for an hour prior to bed=to fall asleep better and better focus at scholol

## 2022-09-05 NOTE — ED PROVIDER NOTES
[place] EMERGENCY DEPARTMENT ([facility])  9/05/22 ED 7    History     Chief Complaint   Patient presents with     Sickle Cell Pain Crisis     Back Pain     The history is provided by the patient and medical records.     Jennifer Cervantes is a 22 year old female with history of sickle cell disease, prior cerebral infarction in 2015 with resulting right-sided hemiplegia and hemiparesis, right hand contractures, cognitive delay, prior PEs who presents today with diffuse back pain that started last night. She states that this is her typical sickle cell crisis pain and that it was not relieved with home remedies of warm compresses and oral oxycodone.  She denies any fevers, shortness of breath, cough, chest pain, abdominal pain, nausea/vomiting.  She denies any trauma to her back.   No other concerns or complaints.        Past Medical History  Past Medical History:   Diagnosis Date     Anxiety      Bleeding disorder (H)      Blood clotting disorder (H)      Cerebral infarction (H) 2015     Cognitive developmental delay     low IQ. Please recognize when managing pain and planning with her     Depressive disorder      Hemiplegia and hemiparesis following cerebral infarction affecting right dominant side (H)     right hand contractures     Iron overload due to repeated red blood cell transfusions      Migraines      Multiple subsegmental pulmonary emboli without acute cor pulmonale (H) 02/01/2021     Oppositional defiant behavior      Superficial venous thrombosis of arm, right 03/25/2021     Uncomplicated asthma      Past Surgical History:   Procedure Laterality Date     AS INSERT TUNNELED CV 2 CATH W/O PORT/PUMP       CHOLECYSTECTOMY       CV RIGHT HEART CATH MEASUREMENTS RECORDED N/A 11/18/2021    Procedure: Right Heart Cath;  Surgeon: Jackson Stauffer MD;  Location:  HEART CARDIAC CATH LAB     INSERT PORT VASCULAR ACCESS Left 4/21/2021    Procedure: INSERTION, VASCULAR ACCESS PORT (NOT SURE ON SIDE UNTIL  REMOVAL);  Surgeon: Rajan More MD;  Location: [facility] OR     IR CHEST PORT PLACEMENT > 5 YRS OF AGE  4/21/2021     IR CVC NON TUNNEL LINE REMOVAL  5/6/2021     IR CVC NON TUNNEL PLACEMENT  04/07/2020     IR CVC NON TUNNEL PLACEMENT  4/30/2021     IR PORT REMOVAL LEFT  4/21/2021     REMOVE PORT VASCULAR ACCESS Left 4/21/2021    Procedure: REMOVAL, VASCULAR ACCESS PORT LEFT;  Surgeon: Rajan More MD;  Location: [facility] OR     REPAIR TENDON ELBOW Right 10/02/2019    Procedure: Right Elbow Flexor Lengthening, Flexor Pronator Slide Of Wrist and Finger, Thumb Adductor Lengthening;  Surgeon: Anai Franco MD;  Location: [facility] OR     TONSILLECTOMY Bilateral 10/02/2019    Procedure: Bilateral Tonsillectomy;  Surgeon: Farhana Guy MD;  Location: [facility] OR     ZZC BREAST REDUCTION (INCLUDES LIPO) TIER 3 Bilateral 04/23/2019     acetaminophen (TYLENOL) 325 MG tablet  albuterol (PROVENTIL) (2.5 MG/3ML) 0.083% neb solution  albuterol (PROVENTIL) (2.5 MG/3ML) 0.083% neb solution  aspirin (ASA) 81 MG chewable tablet  budesonide-formoterol (SYMBICORT) 160-4.5 MCG/ACT Inhaler  deferasirox (JADENU) 360 MG tablet  EPINEPHrine (ANY BX GENERIC EQUIV) 0.3 MG/0.3ML injection 2-pack  Hydroxyurea 1000 MG TABS  ondansetron (ZOFRAN) 8 MG tablet  oxyCODONE IR (ROXICODONE) 15 MG tablet      Allergies   Allergen Reactions     Contrast Dye      Hives and breathing issues     Fish-Derived Products Hives     Seafood Hives     Diagnostic X-Ray Materials      Gadolinium      Family History  Family History   Problem Relation Age of Onset     Sickle Cell Trait Mother      Hypertension Mother      Asthma Mother      Sickle Cell Trait Father      Glaucoma No family hx of      Macular Degeneration No family hx of      Social History   Social History     Tobacco Use     Smoking status: Never Smoker     Smokeless tobacco: Never Used   Substance Use Topics     Alcohol use: Not Currently     Alcohol/week: 0.0 standard drinks     Drug use:  "Never      Past medical history, past surgical history, medications, allergies, family history, and social history were reviewed with the patient. No additional pertinent items.       Review of Systems   Constitutional: Negative for chills and fever.   HENT: Negative for congestion.    Eyes: Negative for redness.   Respiratory: Negative for cough and shortness of breath.    Cardiovascular: Negative for chest pain.   Gastrointestinal: Negative for abdominal pain, nausea and vomiting.   Endocrine: Negative for polydipsia and polyuria.   Genitourinary: Negative for difficulty urinating.   Musculoskeletal: Positive for back pain. Negative for arthralgias, neck pain and neck stiffness.   Skin: Negative for color change.   Neurological: Negative for light-headedness and headaches.   Hematological: Negative for adenopathy. Does not bruise/bleed easily.   Psychiatric/Behavioral: Negative for confusion.   All other systems reviewed and are negative.    A complete review of systems was performed with pertinent positives and negatives noted in the HPI, and all other systems negative.    Physical Exam   BP: (!) 147/102  Pulse: (!) 123  Temp: 98.4  F (36.9  C)  Resp: 18 (crying)  Height: 162.6 cm (5' 4\")  Weight: 76 kg (167 lb 9.6 oz)  SpO2: 93 %  Physical Exam  Vitals and nursing note reviewed.   Constitutional:       General: She is in acute distress ( mild, from pain).      Appearance: She is normal weight. She is not ill-appearing, toxic-appearing or diaphoretic.   HENT:      Head: Normocephalic and atraumatic.      Mouth/Throat:      Mouth: Mucous membranes are moist.      Pharynx: Oropharynx is clear. No oropharyngeal exudate.   Eyes:      General: No scleral icterus.     Extraocular Movements: Extraocular movements intact.      Conjunctiva/sclera: Conjunctivae normal.   Cardiovascular:      Rate and Rhythm: Tachycardia present.      Heart sounds: Normal heart sounds.   Pulmonary:      Effort: Pulmonary effort is normal. " No respiratory distress.      Breath sounds: Normal breath sounds. No wheezing or rhonchi.   Abdominal:      General: Abdomen is flat.      Palpations: Abdomen is soft.      Tenderness: There is no abdominal tenderness.   Musculoskeletal:         General: No tenderness. Normal range of motion.      Cervical back: Normal range of motion and neck supple. No rigidity or tenderness.      Comments: No midline cervical, thoracic, or lumbar spinous process tenderness to palpation.  No lesions on the back.  No tenderness to palpation of the back.   Skin:     General: Skin is warm.      Findings: No rash.   Neurological:      Mental Status: She is alert and oriented to person, place, and time. Mental status is at baseline.      Cranial Nerves: No cranial nerve deficit.      Motor: Weakness ( at baseline) present.      Coordination: Coordination abnormal ( at baseline).   Psychiatric:         Mood and Affect: Mood normal.         Behavior: Behavior normal.         Thought Content: Thought content normal.         Judgment: Judgment normal.         ED Course      Procedures                     Results for orders placed or performed during the hospital encounter of 09/05/22   [place] Draw     Status: None    Narrative    The following orders were created for panel order [place] Draw.  Procedure                               Abnormality         Status                     ---------                               -----------         ------                     Extra Blue Top Tube[[id number]]                              Final result               Extra Red Top Tube[[id number]]                               Final result               Extra Green Top (Lithium...[[id number]]                      Final result               Extra Purple Top Tube[[id number]]                            Final result                 Please view results for these tests on the individual orders.   Extra Blue Top Tube     Status: None   Result Value Ref Range     Hold Specimen     Extra Red Top Tube     Status: None   Result Value Ref Range    Hold Specimen JIC    Extra Green Top (Lithium Heparin) Tube     Status: None   Result Value Ref Range    Hold Specimen JIC    Extra Purple Top Tube     Status: None   Result Value Ref Range    Hold Specimen JIC    Comprehensive metabolic panel     Status: Abnormal   Result Value Ref Range    Sodium 135 (L) 136 - 145 mmol/L    Potassium 4.1 3.4 - 5.3 mmol/L    Creatinine 0.66 0.51 - 0.95 mg/dL    Urea Nitrogen 6.9 6.0 - 20.0 mg/dL    Chloride 104 98 - 107 mmol/L    Carbon Dioxide (CO2) 19 (L) 22 - 29 mmol/L    Anion Gap 12 7 - 15 mmol/L    Glucose 136 (H) 70 - 99 mg/dL    Calcium 9.6 8.6 - 10.0 mg/dL    Protein Total 8.1 6.4 - 8.3 g/dL    Albumin 4.8 3.5 - 5.2 g/dL    Bilirubin Total 3.9 (H) <=1.2 mg/dL    Alkaline Phosphatase 94 35 - 104 U/L    AST 94 (H) 10 - 35 U/L    ALT 66 (H) 10 - 35 U/L    GFR Estimate >90 >60 mL/min/1.73m2   Reticulocyte count     Status: Abnormal   Result Value Ref Range    % Reticulocyte 22.2 (H) 0.5 - 2.0 %    Absolute Reticulocyte 0.536 (H) 0.025 - 0.095 10e6/uL   CBC with platelets and differential     Status: Abnormal   Result Value Ref Range    WBC Count 20.1 (H) 4.0 - 11.0 10e3/uL    RBC Count 2.41 (L) 3.80 - 5.20 10e6/uL    Hemoglobin 7.7 (L) 11.7 - 15.7 g/dL    Hematocrit 21.3 (L) 35.0 - 47.0 %    MCV 88 78 - 100 fL    MCH 32.0 26.5 - 33.0 pg    MCHC 36.2 31.5 - 36.5 g/dL    RDW 25.3 (H) 10.0 - 15.0 %    Platelet Count 339 150 - 450 10e3/uL    % Neutrophils 75 %    % Lymphocytes 14 %    % Monocytes 7 %    % Eosinophils 1 %    % Basophils 1 %    % Immature Granulocytes 2 %    NRBCs per 100 WBC 5 (H) <1 /100    Absolute Neutrophils 15.1 (H) 1.6 - 8.3 10e3/uL    Absolute Lymphocytes 2.8 0.8 - 5.3 10e3/uL    Absolute Monocytes 1.5 (H) 0.0 - 1.3 10e3/uL    Absolute Eosinophils 0.2 0.0 - 0.7 10e3/uL    Absolute Basophils 0.3 (H) 0.0 - 0.2 10e3/uL    Absolute Immature Granulocytes 0.3 <=0.4 10e3/uL     Absolute NRBCs 0.9 10e3/uL   CBC with platelets differential     Status: Abnormal    Narrative    The following orders were created for panel order CBC with platelets differential.  Procedure                               Abnormality         Status                     ---------                               -----------         ------                     CBC with platelets and d...[[id number]]  Abnormal            Final result                 Please view results for these tests on the individual orders.     Medications   oxyCODONE (ROXICODONE) tablet 15 mg (15 mg Oral Given 9/5/22 0740)   ketorolac (TORADOL) injection 15 mg (15 mg Intravenous Given 9/5/22 0739)   ketamine (KETALAR) injection 4 mg (4 mg Intravenous Given 9/5/22 0739)   ketamine (KETALAR) injection 4 mg (4 mg Intravenous Given 9/5/22 1005)        Assessments & Plan (with Medical Decision Making)   Jennifer Cervantes is a 22-year-old woman presenting with diffuse back pain in setting of sickle cell disease.  Differential diagnosis: Sickle cell pain crisis, anemia, aplastic crisis.    After thorough history and physical exam patient appears to be in mild distress due to pain.  I will treat her pain per her Emergency Department protocol with IV Toradol, IV ketamine, LR infusion, and oral oxycodone.  She agrees with the plan.     Laboratory studies returned with leukocytosis of 20,100.  There is chronic anemia with hemoglobin of 7.7.  It appears the patient has had leukocytosis for approximately 1 month.  There are no infectious signs on the physical exam and she is afebrile.  It is unclear to me what the etiology of leukocytosis is.  Reticulocyte count is at 22.2%.  Electrolytes show no evidence of dehydration, creatinine is normal at 0.66.  Patient's pain has improved after ED treatment with oral oxycodone, IV Toradol, and IV ketamine.  She states that she feels comfortable going home and following up in her hematology clinic.  At this point she will be  discharged.  She agrees to return if her symptoms worsen and her home medication is not helping.      This part of the document was transcribed by Bambi Tejeda, Medical Scribe.      I have reviewed the nursing notes. I have reviewed the findings, diagnosis, plan and need for follow up with the patient.    Discharge Medication List as of 9/5/2022 11:32 AM          Final diagnoses:   Sickle cell pain crisis (H)   I was physically present and have reviewed and verified the accuracy of this note documented by my scribe.    Claire Perez MD        --  Claire Perez MD    [facility] EMERGENCY DEPARTMENT  9/5/2022     Claire Perez MD  09/05/22 1509       Claire Perez MD  09/06/22 1115

## 2022-09-06 LAB
ABO/RH(D): NORMAL
ALBUMIN SERPL BCG-MCNC: 4.1 G/DL (ref 3.5–5.2)
ALBUMIN UR-MCNC: NEGATIVE MG/DL
ALP SERPL-CCNC: 94 U/L (ref 35–104)
ALT SERPL W P-5'-P-CCNC: 37 U/L (ref 10–35)
ANION GAP SERPL CALCULATED.3IONS-SCNC: 12 MMOL/L (ref 7–15)
ANTIBODY SCREEN: NEGATIVE
APPEARANCE UR: CLEAR
AST SERPL W P-5'-P-CCNC: 78 U/L (ref 10–35)
ATRIAL RATE - MUSE: 113 BPM
BASOPHILS # BLD AUTO: 0.1 10E3/UL (ref 0–0.2)
BASOPHILS NFR BLD AUTO: 1 %
BILIRUB SERPL-MCNC: 5.2 MG/DL
BILIRUB UR QL STRIP: NEGATIVE
BUN SERPL-MCNC: 4.4 MG/DL (ref 6–20)
C PNEUM DNA SPEC QL NAA+PROBE: NOT DETECTED
CALCIUM SERPL-MCNC: 8.1 MG/DL (ref 8.6–10)
CHLORIDE SERPL-SCNC: 103 MMOL/L (ref 98–107)
COLOR UR AUTO: YELLOW
CREAT SERPL-MCNC: 0.61 MG/DL (ref 0.51–0.95)
DEPRECATED HCO3 PLAS-SCNC: 17 MMOL/L (ref 22–29)
DIASTOLIC BLOOD PRESSURE - MUSE: NORMAL MMHG
EOSINOPHIL # BLD AUTO: 0 10E3/UL (ref 0–0.7)
EOSINOPHIL NFR BLD AUTO: 0 %
ERYTHROCYTE [DISTWIDTH] IN BLOOD BY AUTOMATED COUNT: 24 % (ref 10–15)
FLUAV H1 2009 PAND RNA SPEC QL NAA+PROBE: NOT DETECTED
FLUAV H1 RNA SPEC QL NAA+PROBE: NOT DETECTED
FLUAV H3 RNA SPEC QL NAA+PROBE: NOT DETECTED
FLUAV RNA SPEC QL NAA+PROBE: NOT DETECTED
FLUBV RNA SPEC QL NAA+PROBE: NOT DETECTED
GFR SERPL CREATININE-BSD FRML MDRD: >90 ML/MIN/1.73M2
GLUCOSE SERPL-MCNC: 111 MG/DL (ref 70–99)
GLUCOSE UR STRIP-MCNC: NEGATIVE MG/DL
HADV DNA SPEC QL NAA+PROBE: NOT DETECTED
HCOV PNL SPEC NAA+PROBE: NOT DETECTED
HCT VFR BLD AUTO: 18.4 % (ref 35–47)
HGB BLD-MCNC: 6.6 G/DL (ref 11.7–15.7)
HGB BLD-MCNC: 9.6 G/DL (ref 11.7–15.7)
HGB UR QL STRIP: NEGATIVE
HMPV RNA SPEC QL NAA+PROBE: NOT DETECTED
HPIV1 RNA SPEC QL NAA+PROBE: NOT DETECTED
HPIV2 RNA SPEC QL NAA+PROBE: NOT DETECTED
HPIV3 RNA SPEC QL NAA+PROBE: NOT DETECTED
HPIV4 RNA SPEC QL NAA+PROBE: NOT DETECTED
IMM GRANULOCYTES # BLD: 0.3 10E3/UL
IMM GRANULOCYTES NFR BLD: 1 %
INTERPRETATION ECG - MUSE: NORMAL
KETONES UR STRIP-MCNC: NEGATIVE MG/DL
L PNEUMO1 AG UR QL IA: NEGATIVE
LACTATE SERPL-SCNC: 0.6 MMOL/L (ref 0.7–2)
LEUKOCYTE ESTERASE UR QL STRIP: NEGATIVE
LYMPHOCYTES # BLD AUTO: 1.8 10E3/UL (ref 0.8–5.3)
LYMPHOCYTES NFR BLD AUTO: 8 %
M PNEUMO DNA SPEC QL NAA+PROBE: NOT DETECTED
MCH RBC QN AUTO: 31.1 PG (ref 26.5–33)
MCHC RBC AUTO-ENTMCNC: 35.9 G/DL (ref 31.5–36.5)
MCV RBC AUTO: 87 FL (ref 78–100)
MONOCYTES # BLD AUTO: 1.7 10E3/UL (ref 0–1.3)
MONOCYTES NFR BLD AUTO: 8 %
MUCOUS THREADS #/AREA URNS LPF: PRESENT /LPF
NEUTROPHILS # BLD AUTO: 17.5 10E3/UL (ref 1.6–8.3)
NEUTROPHILS NFR BLD AUTO: 82 %
NITRATE UR QL: NEGATIVE
NRBC # BLD AUTO: 1.1 10E3/UL
NRBC BLD AUTO-RTO: 5 /100
P AXIS - MUSE: 59 DEGREES
PH UR STRIP: 5.5 [PH] (ref 5–7)
PLATELET # BLD AUTO: 287 10E3/UL (ref 150–450)
POTASSIUM SERPL-SCNC: 3.3 MMOL/L (ref 3.4–5.3)
POTASSIUM SERPL-SCNC: 4 MMOL/L (ref 3.4–5.3)
PR INTERVAL - MUSE: 144 MS
PROT SERPL-MCNC: 7.1 G/DL (ref 6.4–8.3)
QRS DURATION - MUSE: 72 MS
QT - MUSE: 348 MS
QTC - MUSE: 477 MS
R AXIS - MUSE: 12 DEGREES
RBC # BLD AUTO: 2.12 10E6/UL (ref 3.8–5.2)
RBC URINE: <1 /HPF
RETICS # AUTO: 0.37 10E6/UL (ref 0.03–0.1)
RETICS/RBC NFR AUTO: 17.3 % (ref 0.5–2)
RSV RNA SPEC QL NAA+PROBE: NOT DETECTED
RSV RNA SPEC QL NAA+PROBE: NOT DETECTED
RV+EV RNA SPEC QL NAA+PROBE: NOT DETECTED
S PNEUM AG SPEC QL: NEGATIVE
SODIUM SERPL-SCNC: 132 MMOL/L (ref 136–145)
SP GR UR STRIP: 1.01 (ref 1–1.03)
SPECIMEN EXPIRATION DATE: NORMAL
SQUAMOUS EPITHELIAL: 1 /HPF
SYSTOLIC BLOOD PRESSURE - MUSE: NORMAL MMHG
T AXIS - MUSE: -11 DEGREES
UFH PPP CHRO-ACNC: 0.63 IU/ML
UFH PPP CHRO-ACNC: 0.75 IU/ML
UROBILINOGEN UR STRIP-MCNC: NORMAL MG/DL
VENTRICULAR RATE- MUSE: 113 BPM
WBC # BLD AUTO: 21.4 10E3/UL (ref 4–11)
WBC URINE: 3 /HPF

## 2022-09-06 PROCEDURE — 85520 HEPARIN ASSAY: CPT | Performed by: STUDENT IN AN ORGANIZED HEALTH CARE EDUCATION/TRAINING PROGRAM

## 2022-09-06 PROCEDURE — 87899 AGENT NOS ASSAY W/OPTIC: CPT | Performed by: STUDENT IN AN ORGANIZED HEALTH CARE EDUCATION/TRAINING PROGRAM

## 2022-09-06 PROCEDURE — 99223 1ST HOSP IP/OBS HIGH 75: CPT | Mod: GC | Performed by: INTERNAL MEDICINE

## 2022-09-06 PROCEDURE — 85025 COMPLETE CBC W/AUTO DIFF WBC: CPT | Performed by: STUDENT IN AN ORGANIZED HEALTH CARE EDUCATION/TRAINING PROGRAM

## 2022-09-06 PROCEDURE — 250N000013 HC RX MED GY IP 250 OP 250 PS 637: Performed by: STUDENT IN AN ORGANIZED HEALTH CARE EDUCATION/TRAINING PROGRAM

## 2022-09-06 PROCEDURE — 80053 COMPREHEN METABOLIC PANEL: CPT | Performed by: STUDENT IN AN ORGANIZED HEALTH CARE EDUCATION/TRAINING PROGRAM

## 2022-09-06 PROCEDURE — 81001 URINALYSIS AUTO W/SCOPE: CPT | Performed by: EMERGENCY MEDICINE

## 2022-09-06 PROCEDURE — 99223 1ST HOSP IP/OBS HIGH 75: CPT | Mod: AI | Performed by: STUDENT IN AN ORGANIZED HEALTH CARE EDUCATION/TRAINING PROGRAM

## 2022-09-06 PROCEDURE — 258N000003 HC RX IP 258 OP 636: Performed by: EMERGENCY MEDICINE

## 2022-09-06 PROCEDURE — 96365 THER/PROPH/DIAG IV INF INIT: CPT | Performed by: EMERGENCY MEDICINE

## 2022-09-06 PROCEDURE — P9016 RBC LEUKOCYTES REDUCED: HCPCS | Performed by: EMERGENCY MEDICINE

## 2022-09-06 PROCEDURE — 250N000011 HC RX IP 250 OP 636: Performed by: EMERGENCY MEDICINE

## 2022-09-06 PROCEDURE — 250N000009 HC RX 250: Performed by: PHYSICIAN ASSISTANT

## 2022-09-06 PROCEDURE — 96366 THER/PROPH/DIAG IV INF ADDON: CPT | Performed by: EMERGENCY MEDICINE

## 2022-09-06 PROCEDURE — 250N000009 HC RX 250: Performed by: STUDENT IN AN ORGANIZED HEALTH CARE EDUCATION/TRAINING PROGRAM

## 2022-09-06 PROCEDURE — 250N000011 HC RX IP 250 OP 636: Performed by: STUDENT IN AN ORGANIZED HEALTH CARE EDUCATION/TRAINING PROGRAM

## 2022-09-06 PROCEDURE — 120N000002 HC R&B MED SURG/OB UMMC

## 2022-09-06 PROCEDURE — 85018 HEMOGLOBIN: CPT | Performed by: STUDENT IN AN ORGANIZED HEALTH CARE EDUCATION/TRAINING PROGRAM

## 2022-09-06 PROCEDURE — 85045 AUTOMATED RETICULOCYTE COUNT: CPT | Performed by: STUDENT IN AN ORGANIZED HEALTH CARE EDUCATION/TRAINING PROGRAM

## 2022-09-06 PROCEDURE — 36415 COLL VENOUS BLD VENIPUNCTURE: CPT | Performed by: STUDENT IN AN ORGANIZED HEALTH CARE EDUCATION/TRAINING PROGRAM

## 2022-09-06 PROCEDURE — 85520 HEPARIN ASSAY: CPT | Performed by: EMERGENCY MEDICINE

## 2022-09-06 PROCEDURE — 999N000248 HC STATISTIC IV INSERT WITH US BY RN

## 2022-09-06 PROCEDURE — 87486 CHLMYD PNEUM DNA AMP PROBE: CPT | Performed by: STUDENT IN AN ORGANIZED HEALTH CARE EDUCATION/TRAINING PROGRAM

## 2022-09-06 PROCEDURE — 84132 ASSAY OF SERUM POTASSIUM: CPT | Performed by: STUDENT IN AN ORGANIZED HEALTH CARE EDUCATION/TRAINING PROGRAM

## 2022-09-06 PROCEDURE — 83605 ASSAY OF LACTIC ACID: CPT | Performed by: STUDENT IN AN ORGANIZED HEALTH CARE EDUCATION/TRAINING PROGRAM

## 2022-09-06 PROCEDURE — 36415 COLL VENOUS BLD VENIPUNCTURE: CPT | Performed by: EMERGENCY MEDICINE

## 2022-09-06 RX ORDER — KETOROLAC TROMETHAMINE 15 MG/ML
15 INJECTION, SOLUTION INTRAMUSCULAR; INTRAVENOUS ONCE
Status: COMPLETED | OUTPATIENT
Start: 2022-09-06 | End: 2022-09-06

## 2022-09-06 RX ORDER — AMOXICILLIN 250 MG
1 CAPSULE ORAL 2 TIMES DAILY PRN
Status: DISCONTINUED | OUTPATIENT
Start: 2022-09-06 | End: 2022-09-10

## 2022-09-06 RX ORDER — CEFTRIAXONE 1 G/1
1 INJECTION, POWDER, FOR SOLUTION INTRAMUSCULAR; INTRAVENOUS EVERY 24 HOURS
Status: DISCONTINUED | OUTPATIENT
Start: 2022-09-06 | End: 2022-09-07

## 2022-09-06 RX ORDER — OXYCODONE HYDROCHLORIDE 10 MG/1
20 TABLET ORAL EVERY 4 HOURS PRN
Status: DISCONTINUED | OUTPATIENT
Start: 2022-09-06 | End: 2022-09-13 | Stop reason: HOSPADM

## 2022-09-06 RX ORDER — ASPIRIN 81 MG/1
81 TABLET, CHEWABLE ORAL 2 TIMES DAILY
Status: DISCONTINUED | OUTPATIENT
Start: 2022-09-06 | End: 2022-09-13 | Stop reason: HOSPADM

## 2022-09-06 RX ORDER — AMOXICILLIN 250 MG
1 CAPSULE ORAL 2 TIMES DAILY
Status: DISCONTINUED | OUTPATIENT
Start: 2022-09-06 | End: 2022-09-06

## 2022-09-06 RX ORDER — KETAMINE HYDROCHLORIDE 10 MG/ML
4 INJECTION, SOLUTION INTRAMUSCULAR; INTRAVENOUS ONCE
Status: COMPLETED | OUTPATIENT
Start: 2022-09-06 | End: 2022-09-06

## 2022-09-06 RX ORDER — AZITHROMYCIN 250 MG/1
500 TABLET, FILM COATED ORAL EVERY 24 HOURS
Status: DISCONTINUED | OUTPATIENT
Start: 2022-09-06 | End: 2022-09-09

## 2022-09-06 RX ORDER — POTASSIUM CHLORIDE 1.5 G/1.58G
40 POWDER, FOR SOLUTION ORAL ONCE
Status: COMPLETED | OUTPATIENT
Start: 2022-09-06 | End: 2022-09-06

## 2022-09-06 RX ORDER — HYDROXYUREA 500 MG/1
3000 CAPSULE ORAL DAILY
Status: DISCONTINUED | OUTPATIENT
Start: 2022-09-06 | End: 2022-09-13 | Stop reason: HOSPADM

## 2022-09-06 RX ORDER — LIDOCAINE 40 MG/G
CREAM TOPICAL
Status: DISCONTINUED | OUTPATIENT
Start: 2022-09-06 | End: 2022-09-13 | Stop reason: HOSPADM

## 2022-09-06 RX ORDER — DEFERASIROX 360 MG/1
1440 TABLET, FILM COATED ORAL EVERY EVENING
Status: DISCONTINUED | OUTPATIENT
Start: 2022-09-06 | End: 2022-09-06

## 2022-09-06 RX ORDER — ACETAMINOPHEN 325 MG/1
975 TABLET ORAL EVERY 8 HOURS PRN
Status: DISCONTINUED | OUTPATIENT
Start: 2022-09-06 | End: 2022-09-13 | Stop reason: HOSPADM

## 2022-09-06 RX ORDER — AMOXICILLIN 250 MG
2 CAPSULE ORAL 2 TIMES DAILY PRN
Status: DISCONTINUED | OUTPATIENT
Start: 2022-09-06 | End: 2022-09-10

## 2022-09-06 RX ORDER — AMOXICILLIN 250 MG
2 CAPSULE ORAL 2 TIMES DAILY
Status: DISCONTINUED | OUTPATIENT
Start: 2022-09-06 | End: 2022-09-06

## 2022-09-06 RX ORDER — ALBUTEROL SULFATE 0.83 MG/ML
5 SOLUTION RESPIRATORY (INHALATION) EVERY 6 HOURS PRN
Status: DISCONTINUED | OUTPATIENT
Start: 2022-09-06 | End: 2022-09-13 | Stop reason: HOSPADM

## 2022-09-06 RX ORDER — ONDANSETRON 4 MG/1
8 TABLET, FILM COATED ORAL EVERY 8 HOURS PRN
Status: DISCONTINUED | OUTPATIENT
Start: 2022-09-06 | End: 2022-09-13 | Stop reason: HOSPADM

## 2022-09-06 RX ADMIN — HEPARIN SODIUM AND DEXTROSE 1400 UNITS/HR: 10000; 5 INJECTION INTRAVENOUS at 00:23

## 2022-09-06 RX ADMIN — SODIUM CHLORIDE: 9 INJECTION, SOLUTION INTRAVENOUS at 07:47

## 2022-09-06 RX ADMIN — OXYCODONE HYDROCHLORIDE 20 MG: 10 TABLET ORAL at 22:03

## 2022-09-06 RX ADMIN — KETAMINE HYDROCHLORIDE 4 MG: 10 INJECTION, SOLUTION INTRAMUSCULAR; INTRAVENOUS at 20:17

## 2022-09-06 RX ADMIN — OXYCODONE HYDROCHLORIDE 20 MG: 10 TABLET ORAL at 05:08

## 2022-09-06 RX ADMIN — OXYCODONE HYDROCHLORIDE 15 MG: 5 TABLET ORAL at 02:08

## 2022-09-06 RX ADMIN — ASPIRIN 81 MG CHEWABLE TABLET 81 MG: 81 TABLET CHEWABLE at 20:18

## 2022-09-06 RX ADMIN — HEPARIN SODIUM AND DEXTROSE 1400 UNITS/HR: 10000; 5 INJECTION INTRAVENOUS at 13:46

## 2022-09-06 RX ADMIN — OXYCODONE HYDROCHLORIDE 20 MG: 10 TABLET ORAL at 11:11

## 2022-09-06 RX ADMIN — AZITHROMYCIN MONOHYDRATE 500 MG: 250 TABLET ORAL at 05:08

## 2022-09-06 RX ADMIN — CEFTRIAXONE SODIUM 1 G: 1 INJECTION, POWDER, FOR SOLUTION INTRAMUSCULAR; INTRAVENOUS at 05:07

## 2022-09-06 RX ADMIN — ASPIRIN 81 MG CHEWABLE TABLET 81 MG: 81 TABLET CHEWABLE at 07:58

## 2022-09-06 RX ADMIN — SODIUM CHLORIDE: 9 INJECTION, SOLUTION INTRAVENOUS at 20:43

## 2022-09-06 RX ADMIN — OXYCODONE HYDROCHLORIDE 20 MG: 10 TABLET ORAL at 18:08

## 2022-09-06 RX ADMIN — KETOROLAC TROMETHAMINE 15 MG: 15 INJECTION, SOLUTION INTRAMUSCULAR; INTRAVENOUS at 13:16

## 2022-09-06 RX ADMIN — HYDROMORPHONE HYDROCHLORIDE 1 MG: 1 INJECTION, SOLUTION INTRAMUSCULAR; INTRAVENOUS; SUBCUTANEOUS at 11:25

## 2022-09-06 RX ADMIN — Medication 4 MG: at 13:28

## 2022-09-06 RX ADMIN — HYDROXYUREA 3000 MG: 500 CAPSULE ORAL at 12:39

## 2022-09-06 RX ADMIN — HYDROMORPHONE HYDROCHLORIDE 1 MG: 1 INJECTION, SOLUTION INTRAMUSCULAR; INTRAVENOUS; SUBCUTANEOUS at 00:17

## 2022-09-06 RX ADMIN — POTASSIUM CHLORIDE 40 MEQ: 1.5 POWDER, FOR SOLUTION ORAL at 12:38

## 2022-09-06 ASSESSMENT — ACTIVITIES OF DAILY LIVING (ADL)
ADLS_ACUITY_SCORE: 35
ADLS_ACUITY_SCORE: 26
ADLS_ACUITY_SCORE: 35
ADLS_ACUITY_SCORE: 35
ADLS_ACUITY_SCORE: 26
ADLS_ACUITY_SCORE: 35
ADLS_ACUITY_SCORE: 26
ADLS_ACUITY_SCORE: 35
ADLS_ACUITY_SCORE: 41
ADLS_ACUITY_SCORE: 35

## 2022-09-06 NOTE — PROGRESS NOTES
Hematology note    Paged to discuss plan for management for the patient with sickle cell pain crisis and possible acute chest syndrome.     is a patient with sickle cell disease (HbSS). Presented earlier today with acute pain crisis, now with chest pain, hypoxia, and SOB.    There is concern for PE given prior history, elevated D-dimer at 10.44(although non-specific) and patient is currently not on anticoagulant. Cannot obtain CTA overnight due to contrast allergy. Also has leukocytosis, elevated procalcitonin with possible new infiltration on LLL concern for CAP as well. Moreover, this clinical picture can be acute chest syndrome and it is difficult to distinguish her clinical picture right now.     - Agree with empiric therapeutic anticoagulant for PE  - Agree with empiric coverage for CAP  - Please give 2 units of pRBC for possible acute chest syndrome  - Please follow care plan for a guidance for pain management as well as maintenance IVF and other supportive care    Please page if O2 requirement continue to increase after transfusion completed for consideration of exchange transfusion. Hematology team is available if any questions or concerns.     Ramesh Aguila MD  Hematology/Medical Oncology (PGY-4)  P: [phone]

## 2022-09-06 NOTE — PROGRESS NOTES
Major Shift Events:       Neuro:  Neuro status at baseline.  Resp: Lungs clear/diminished on 5L N/C.   Cards: BP stable with MAPs >65. No drips to maintain parameters. Tachy.  GI/: Regular diet. Not eating due to discomfort.   Pain: Pain was uncontrolled. Ketamine and Toradol given with good relief. Using hot packs and Aqua K pad.  Labs: Potassium replacement completed. Recheck lab scheduled for K+ and Heparin Xa. Hgb decreased - received PRBC's with a recheck in am.     Plan:  Continue to closely monitor.  For vital signs and complete assessments, please see documentation flowsheets.

## 2022-09-06 NOTE — PROGRESS NOTES
Paged team for pt's pain 10/10. Oxycodone not working per pt. Pt requesting iv pain medication.   Also requesting RN managed potassium order, Potassium 3.3.

## 2022-09-06 NOTE — CONSULTS
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 Admitted to medicine service 9/5/2022 for acute sickle cell pain crisis with possible acute chest syndrome.    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 Presented initially with diffuse back pain (typical for her pain crisis), later developed chest pain and hypoxia.    There is concern for PE given prior history of recurrent VTEs (in the setting of subtherapeutic INR), elevated D-dimer at 10.44 (although non-specific) and patient is currently not on anticoagulant (on ASA BID). Cannot obtain CTA overnight due to contrast allergy. Also has leukocytosis, elevated procalcitonin with possible new infiltration on both lower lungs concern for CAP. This clinical picture of chest pain, hypoxia, and CXR infiltration can be acute chest syndrome as well and it is difficult to distinguish between these diagnosis at this time.     Patient is receiving therapeutic anticoagulant for possible PE and empiric coverage for CAP. She is currently requiring low O2 support, we will monitor her O2 requirement closely in the setting of possible acute chest syndrome. Start the treatment with pRBC transfusion to reduce the percentage  of HbS, can give for a total of 2-3 units for her current Hgb at 6.6 g/dL (goal not to exceed 10 g/dL). If no improvement after transfusion and no other obvious explanation for hypoxia identified, would consider exchange transfusion in the next 1-2 days.    Recommendations:  - Received 2 units of pRBC (9/6), recheck Hgb at 6PM (ordered for you)  - For management of acute chest syndrome   -- Supportive care. No further transfusion required overnight if O2 requirement remains stable.   -- If O2 requirement increased, can give another unit of pRBC. Will consider exchange transfusion if no improvement and no other explanation for worsening hypoxia.  - Please follow care plan for a guidance for pain management as well as maintenance IVF and other supportive care. Okay for IV opioids as needed as last line after other multimodal pain management (including ketamine).  - Continue Hydrea 3000 mg daily (ordered for you)  - Plan to continue Jadenu 1440 mg daily, however, patient cannot bring supply from home and this cannot be provide from hospital supply  - Agree with empiric therapeutic anticoagulant for PE (currently on high-intensity heparin). Consider V/Q scan in the coming days to help determine anticoagulant plan on discharge.  - Agree with empiric coverage for CAP (currently on ceftriaxone and azithromycin)  - Monitor CBC, CMP, reticulocyte daily    We will continue to follow this patient while hospitalized. Please do not hesitate to page with any questions or concerns.    Patient was seen and plan of care was discussed with attending physician Dr. Villagran.    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 Admitted to medicine service 9/5/2022 for acute sickle cell pain crisis with possible acute chest syndrome.    Patient initially presented to the ED earlier in the day of admission with one day history of diffuse back pain. She states that this is her typical sickle cell crisis pain and that it was not relieved with home remedies of warm compresses and oral oxycodone. She was treated supportively (IV Toradol, IV ketamine, LR infusion, and oral oxycodone) and was discharged home. The patient presented to the ED again on the same day, with chest pain, SOB, and found to be hypoxic.    Today, patient reports feeling okay but is tired. States that her breathing is okay and denies cough or increased sputum production. Back pain is okay. Denies fever or chills. Denies abdominal pain.    Review of Systems:  A comprehensive ROS was performed and found to be negative or non-contributory with the exception of that noted in the HPI above.    Past Medical History:  Past Medical History:   Diagnosis Date     Anxiety      Bleeding disorder (H)      Blood clotting disorder (H)      Cerebral infarction (H) 2015     Cognitive developmental delay     low IQ. Please recognize when managing pain and planning with her     Depressive disorder      Hemiplegia and hemiparesis following cerebral infarction affecting right dominant side (H)     right hand contractures     Iron overload due to repeated red blood cell transfusions      Migraines      Multiple subsegmental pulmonary emboli without acute cor pulmonale (H) 02/01/2021     Oppositional defiant behavior      Superficial venous thrombosis of arm, right 03/25/2021     Uncomplicated asthma      Past Surgical  "History:  Past Surgical History:   Procedure Laterality Date     AS INSERT TUNNELED CV 2 CATH W/O PORT/PUMP       CHOLECYSTECTOMY       CV RIGHT HEART CATH MEASUREMENTS RECORDED N/A 11/18/2021    Procedure: Right Heart Cath;  Surgeon: Jackson Stauffer MD;  Location:  HEART CARDIAC CATH LAB     INSERT PORT VASCULAR ACCESS Left 4/21/2021    Procedure: INSERTION, VASCULAR ACCESS PORT (NOT SURE ON SIDE UNTIL REMOVAL);  Surgeon: Rajan More MD;  Location: [facility] OR     IR CHEST PORT PLACEMENT > 5 YRS OF AGE  4/21/2021     IR CVC NON TUNNEL LINE REMOVAL  5/6/2021     IR CVC NON TUNNEL PLACEMENT  04/07/2020     IR CVC NON TUNNEL PLACEMENT  4/30/2021     IR PORT REMOVAL LEFT  4/21/2021     REMOVE PORT VASCULAR ACCESS Left 4/21/2021    Procedure: REMOVAL, VASCULAR ACCESS PORT LEFT;  Surgeon: Rajan More MD;  Location: [facility] OR     REPAIR TENDON ELBOW Right 10/02/2019    Procedure: Right Elbow Flexor Lengthening, Flexor Pronator Slide Of Wrist and Finger, Thumb Adductor Lengthening;  Surgeon: Anai Franco MD;  Location: [facility]     TONSILLECTOMY Bilateral 10/02/2019    Procedure: Bilateral Tonsillectomy;  Surgeon: Farhana Guy MD;  Location: [facility]     ZC BREAST REDUCTION (INCLUDES LIPO) TIER 3 Bilateral 04/23/2019     Social History:  Social History     Socioeconomic History     Marital status: Single   Tobacco Use     Smoking status: Never Smoker     Smokeless tobacco: Never Used   Substance and Sexual Activity     Alcohol use: Not Currently     Alcohol/week: 0.0 standard drinks     Drug use: Never     Sexual activity: Not Currently     Partners: Male     Birth control/protection: Other   Social History Narrative    Lives with mom and extended family but \"toxic environment\" per her report. She would like to move out but cannot financially do so. She has minimal support at home despite her significant SCD comorbidities and cognitive delay from stroke.     Social Determinants of Health " "    Intimate Partner Violence: Not At Risk     Fear of Current or Ex-Partner: No     Emotionally Abused: No     Physically Abused: No     Sexually Abused: No      Family History:  Family History   Problem Relation Age of Onset     Sickle Cell Trait Mother      Hypertension Mother      Asthma Mother      Sickle Cell Trait Father      Glaucoma No family hx of      Macular Degeneration No family hx of      Medications and allergies reviewed.    Physical Exam:    Blood pressure (!) 150/93, pulse 119, temperature 99.1  F (37.3  C), temperature source Temporal, resp. rate 20, height 1.626 m (5' 4\"), weight 77.1 kg (170 lb), SpO2 96 %, not currently breastfeeding.  General: Alert and cooperative, lying in bed, discomfort in pain but not in acute distress  HEENT: Sclera anicteric, EOMI, MMM  CV: RRR, no murmurs  Resp: CTAB, normal respiratory effort on ambient air  GI: Soft, non-tender, non-distended  MSK: Warm and well-perfused, no edema  Skin: No rashes or petechiae  Neuro: Drowsy but is oriented to situation. R hemiparesis.    Labs & Studies: I personally reviewed the following studies:  ROUTINE LABS (Last four results):  CMP  Recent Labs   Lab 09/06/22 0642 09/05/22 2022 09/05/22  0535   * 134* 135*   POTASSIUM 3.3* 4.0 4.1   CHLORIDE 103 103 104   CO2 17* 19* 19*   ANIONGAP 12 12 12   * 130* 136*   BUN 4.4* 4.8* 6.9   CR 0.61 0.59 0.66   GFRESTIMATED >90 >90 >90   MICAH 8.1* 9.3 9.6   PROTTOTAL 7.1 8.3 8.1   ALBUMIN 4.1 4.8 4.8   BILITOTAL 5.2* 4.7* 3.9*   ALKPHOS 94 103 94   AST 78* 91* 94*   ALT 37* 55* 66*     CBC  Recent Labs   Lab 09/06/22 0642 09/05/22 2022 09/05/22  0535   WBC 21.4* 26.7* 20.1*   RBC 2.12* 2.42* 2.41*   HGB 6.6* 7.5* 7.7*   HCT 18.4* 20.5* 21.3*   MCV 87 85 88   MCH 31.1 31.0 32.0   MCHC 35.9 36.6* 36.2   RDW 24.0* 25.2* 25.3*    282 339     Labs and images are reviewed and discussed as pertinent in assessment and plan.    "

## 2022-09-06 NOTE — PROGRESS NOTES
This is a recent snapshot of the patient's [facility] medical record.  For current drug dose and complete information and questions, call [phone] or In Basket pool, [facility]v home infusion ([id number])  CSN Number:  [id number]

## 2022-09-06 NOTE — H&P
[facility]    History and Physical - Hospitalist Service, GOLD TEAM        Date of Admission:  9/5/2022    Assessment & Plan      Jennifer Cervantes is a 22 year old female admitted on 9/5/2022. She has a past medical history of sickle cell disease, CVA, PE, and cognitive delay who presents with shortness of breath and chest pain.    Concern for PE  SOB  Hypoxia  Chest pain  History of PE  Patient with chest pain and SOB with concern for sickle cell pain, possible acute chest syndrome (as below), and PE (given elevated D-dimer, history of VTE, and currently not on anticoagulant).  Unable to obtain CT PE overnight due to her contrast allergy.  Patient started on heparin drip, will continue. Pain/hypoxia currently improving. Hematology following.  - Continue heparin drip  - Hematology consult, appreciate recs  - Monitor CBC w/ diff, retic count, bilirubin in AM   - Do not transfuse blood unless discussed with Hematology first. Do not transfuse based solely off hemoglobin level given risk of iron overload and risk of developing RBC antibodies.   - Monitor O2 sats and vitals closely  - Telemetry  - Hydrea and deferasirox per hematology in AM  - IVF: Continue NS at 125cc/hr  - Regular diet     Acute chest syndrome  Leukocytosis (neutrophil predominant)  CXR at admission with lower lung volumes but new opacity in both lung bases (atelectasis versus infiltrate possible).  Negative COVID.  Hematology has seen and given leukocytosis, elevated procal, and CXR findings concerning for acute chest.  Procal mildly elevated, negative troponin, and lactate not elevated.  Given Zosyn in the ED, will continue ceftriaxone and azithromycin.  -Consult hematology, appreciate recs  -Start ceftriaxone and azithromycin  -2 units of packed red blood cells ordered form ED  -Encourage incentive spirometry  -Follow blood culture  -Follow respiratory viral panel  -Follow as pneumonia/Legionella urine  "antigens  -Continue IVF as above    Sickle cell anemia  History of CVA with RUE hemiparesis  Sickle Cell Pain Crisis  Back pain  Presented earlier in the day with sickle cell pain crisis (back pain) and was discharged after treatment with Toradol, ketamine, LR, and oxycodone. Subsequently developed chest pain and SOB prior to returning to ED. Of note she has prior treatment plan in chart.  Discussed continuing home regimen for the time being and patient in agreement.  - Continue home regimen of oxycodone 15 mg every 4 hours as needed   -If worsening pain then may consider increasing dosage to 20mg   -If still worsening pain then would start ketamine (has previously started at 4 mg/h)  - Continue Tylenol 975mg q8hr PRN  -Zofran as needed  -Continue ASA  -Senna PRN, pending stool output may need to schedule    Asthma  Continue PTA Symbicort (Breo Ellipta sub per pharm) and albuterol       Diet: Regular Diet Adult  DVT Prophylaxis: Heparin drip  Lopez Catheter: Not present  Central Lines: PRESENT       Cardiac Monitoring: None  Code Status: Full Code    Clinically Significant Risk Factors Present on Admission                    # Overweight: Estimated body mass index is 29.18 kg/m  as calculated from the following:    Height as of this encounter: 1.626 m (5' 4\").    Weight as of this encounter: 77.1 kg (170 lb).        Disposition Plan     TBD    The patient's care was discussed with the Patient and ED Team.    Lanre Royal MD  Hospitalist Service, [facility]  Securely message with the [employer] Web Console (learn more here)  Text page via [employer] Paging/Directory         ______________________________________________________________________    Chief Complaint   Chest pain SOB    History is obtained from the patient    History of Present Illness   Jennifer Cervantes is a 22 year old female who has a past medical history of sickle cell disease, CVA, PE, and cognitive " delay who is presenting with shortness of breath and chest pain.    Patient developed acute chest pain and shortness of breath after ED visit earlier in day.  She reports diffuse back pain that started night prior to admission.  Patient states that this is her typical crisis pain. Received Toradol, LR, ketamine, and oxycodone. No motoric or neurological sensation loss. No cough.  No pleuritic pain.  No abdominal pain.  Currently she does not have any nausea or vomiting.  No recent trauma.  No bleeding per any orifice.  No headaches or vision changes.  No rhinorrhea.  No urinary symptoms.  No changes in urination or defecation.  Patient has been taking her medications.  She notes a contrast allergy and is okay with empirically treating for PE with heparin at this time.  No chronic disease in family.  Patient does not smoke or drink alcohol.  Full code.    Review of Systems    The 10 point Review of Systems is negative other than noted in the HPI or here.     Past Medical History    I have reviewed this patient's medical history and updated it with pertinent information if needed.   Past Medical History:   Diagnosis Date     Anxiety      Bleeding disorder (H)      Blood clotting disorder (H)      Cerebral infarction (H) 2015     Cognitive developmental delay     low IQ. Please recognize when managing pain and planning with her     Depressive disorder      Hemiplegia and hemiparesis following cerebral infarction affecting right dominant side (H)     right hand contractures     Iron overload due to repeated red blood cell transfusions      Migraines      Multiple subsegmental pulmonary emboli without acute cor pulmonale (H) 02/01/2021     Oppositional defiant behavior      Superficial venous thrombosis of arm, right 03/25/2021     Uncomplicated asthma        Past Surgical History   I have reviewed this patient's surgical history and updated it with pertinent information if needed.  Past Surgical History:   Procedure  Laterality Date     AS INSERT TUNNELED CV 2 CATH W/O PORT/PUMP       CHOLECYSTECTOMY       CV RIGHT HEART CATH MEASUREMENTS RECORDED N/A 11/18/2021    Procedure: Right Heart Cath;  Surgeon: Jackson Stauffer MD;  Location:  HEART CARDIAC CATH LAB     INSERT PORT VASCULAR ACCESS Left 4/21/2021    Procedure: INSERTION, VASCULAR ACCESS PORT (NOT SURE ON SIDE UNTIL REMOVAL);  Surgeon: Rajan More MD;  Location: [facility] OR     IR CHEST PORT PLACEMENT > 5 YRS OF AGE  4/21/2021     IR CVC NON TUNNEL LINE REMOVAL  5/6/2021     IR CVC NON TUNNEL PLACEMENT  04/07/2020     IR CVC NON TUNNEL PLACEMENT  4/30/2021     IR PORT REMOVAL LEFT  4/21/2021     REMOVE PORT VASCULAR ACCESS Left 4/21/2021    Procedure: REMOVAL, VASCULAR ACCESS PORT LEFT;  Surgeon: Rajan More MD;  Location: [facility] OR     REPAIR TENDON ELBOW Right 10/02/2019    Procedure: Right Elbow Flexor Lengthening, Flexor Pronator Slide Of Wrist and Finger, Thumb Adductor Lengthening;  Surgeon: Anai Franco MD;  [facility]     TONSILLECTOMY Bilateral 10/02/2019    Procedure: Bilateral Tonsillectomy;  Surgeon: Farhana Guy MD;  [facility]: [facility]     ZZC BREAST REDUCTION (INCLUDES LIPO) TIER 3 Bilateral 04/23/2019       Social History   I have reviewed this patient's social history and updated it with pertinent information if needed.  Social History     Tobacco Use     Smoking status: Never Smoker     Smokeless tobacco: Never Used   Substance Use Topics     Alcohol use: Not Currently     Alcohol/week: 0.0 standard drinks     Drug use: Never       Family History   I have reviewed this patient's family history and updated it with pertinent information if needed.  Family History   Problem Relation Age of Onset     Sickle Cell Trait Mother      Hypertension Mother      Asthma Mother      Sickle Cell Trait Father      Glaucoma No family hx of      Macular Degeneration No family hx of        Prior to Admission Medications   Prior to Admission  Medications   Prescriptions Last Dose Informant Patient Reported? Taking?   EPINEPHrine (ANY BX GENERIC EQUIV) 0.3 MG/0.3ML injection 2-pack   No No   Sig: Inject 0.3 mLs (0.3 mg) into the muscle as needed for anaphylaxis   Hydroxyurea 1000 MG TABS   No No   Sig: Take 3,000 mg by mouth daily   acetaminophen (TYLENOL) 325 MG tablet  Self No No   Sig: Take 2 tablets (650 mg) by mouth every 6 hours as needed for mild pain   albuterol (PROVENTIL) (2.5 MG/3ML) 0.083% neb solution   No No   Sig: Take 1 vial (2.5 mg) by nebulization every 6 hours as needed for shortness of breath / dyspnea or wheezing   albuterol (PROVENTIL) (2.5 MG/3ML) 0.083% neb solution   No No   Sig: Take 2 vials (5 mg) by nebulization every 6 hours as needed for shortness of breath / dyspnea or wheezing   aspirin (ASA) 81 MG chewable tablet   No No   Sig: Take 1 tablet (81 mg) by mouth 2 times daily   budesonide-formoterol (SYMBICORT) 160-4.5 MCG/ACT Inhaler   No No   Sig: Inhale 2 puffs into the lungs 2 times daily   deferasirox (JADENU) 360 MG tablet   No No   Sig: Take 4 tablets (1,440 mg) by mouth every evening   ondansetron (ZOFRAN) 8 MG tablet   No No   Sig: Take 1 tablet (8 mg) by mouth every 8 hours as needed   oxyCODONE IR (ROXICODONE) 15 MG tablet   No No   Sig: Take 1 tablet (15 mg) by mouth every 4 hours as needed for severe pain Goal 4 per day. Max 6 per day.      Facility-Administered Medications: None     Allergies   Allergies   Allergen Reactions     Contrast Dye      Hives and breathing issues     Fish-Derived Products Hives     Seafood Hives     Diagnostic X-Ray Materials      Gadolinium        Physical Exam   Vital Signs: Temp: 99.1  F (37.3  C) Temp src: Temporal BP: (!) 137/96 Pulse: 119   Resp: 20 SpO2: 94 % O2 Device: Nasal cannula Oxygen Delivery: 2 LPM  Weight: 170 lbs 0 oz    Gen: Well-developed, well-nourished, and in mild distress  HEENT:  Normocephalic, atraumatic, PERRL, no scleral icterus, no nasal discharge, OP moist  and without lesions.    Neck: Supple, no LAD  CV: Normal S1 S2, tachycardic  Respiratory: CTAB, normal respiratory effort  Chest: L sided port without surrounding erythema or tenderness to palpation  Abdominal: Bowel sounds +, soft NT/ND  Extremities: No peripheral edema.  Skin: Warm and dry.   Neuro: Alert and oriented x3. Cranial nerves grossly intact.  Preserved upper and lower extremity strength and sensation.  Psych: normal mood/affect, appropriately oriented      Data   Data reviewed today: I reviewed all medications, new labs and imaging results over the last 24 hours. I personally reviewed the chest x-ray image(s) showing Atelectasis vs infiltrate at lung bases, decreased lung volumes.    Recent Labs   Lab 09/05/22 2022 09/05/22  0535   WBC 26.7* 20.1*   HGB 7.5* 7.7*   MCV 85 88    339   * 135*   POTASSIUM 4.0 4.1   CHLORIDE 103 104   CO2 19* 19*   BUN 4.8* 6.9   CR 0.59 0.66   ANIONGAP 12 12   MICAH 9.3 9.6   * 136*   ALBUMIN 4.8 4.8   PROTTOTAL 8.3 8.1   BILITOTAL 4.7* 3.9*   ALKPHOS 103 94   ALT 55* 66*   AST 91* 94*     Recent Results (from the past 24 hour(s))   US Lower Extremity Venous Duplex Bilateral    Impression    RESIDENT PRELIMINARY INTERPRETATION  IMPRESSION:  No evidence of deep venous thrombosis in either lower extremity.   XR Chest Port 1 View    Narrative    EXAM: XR CHEST PORT 1 VIEW  LOCATION: [facility]  DATE/TIME: 9/5/2022 11:00 PM    INDICATION: sob hypoxia  COMPARISON: 8/20/2022      Impression    IMPRESSION: Lung volumes are lower and there is new slight opacity at both lung bases which could relate to the lower lung volumes but developing bibasilar atelectasis/infiltrate possible. Upper lung zones remain clear. Heart size normal. Port-A-Cath   present

## 2022-09-06 NOTE — PROGRESS NOTES
This is a recent snapshot of the patient's [facility] medical record.  For current drug dose and complete information and questions, call [phone] or In Basket pool, [facility]v home infusion ([id number])  CSN Number:  [id number]

## 2022-09-06 NOTE — ED PROVIDER NOTES
[place] EMERGENCY DEPARTMENT ([facility])  9/05/22    History     Chief Complaint   Patient presents with     Sickle Cell Pain Crisis     HPI  Jennifer Cervantes is a 22 year old female with PMH significant for sickle cell disease, prior cerebral infarction in 2015 with resulting right-sided hemiplegia and hemiparesis, right hand contractures, cognitive delay, and prior PEs who presents to the ED for evaluation of sickle cell pain crisis with new chest pain and shortness of breath since leaving the ER early this morning.    Per review of chart, patient was here earlier today with sickle cell pain crisis and was discharged at approximately noon (7 hours prior to arrival).  She was treated with IV Toradol, IV ketamine, LR infusion, and oral oxycodone.  She did have a leukocytosis and chronic anemia.  The leukocytosis seemed to have been present for approximately a month prior without known etiology.  Patient improved after treatment and was comfortable to be discharged home.    Past Medical History  Past Medical History:   Diagnosis Date     Anxiety      Bleeding disorder (H)      Blood clotting disorder (H)      Cerebral infarction (H) 2015     Cognitive developmental delay     low IQ. Please recognize when managing pain and planning with her     Depressive disorder      Hemiplegia and hemiparesis following cerebral infarction affecting right dominant side (H)     right hand contractures     Iron overload due to repeated red blood cell transfusions      Migraines      Multiple subsegmental pulmonary emboli without acute cor pulmonale (H) 02/01/2021     Oppositional defiant behavior      Superficial venous thrombosis of arm, right 03/25/2021     Uncomplicated asthma      Past Surgical History:   Procedure Laterality Date     AS INSERT TUNNELED CV 2 CATH W/O PORT/PUMP       CHOLECYSTECTOMY       CV RIGHT HEART CATH MEASUREMENTS RECORDED N/A 11/18/2021    Procedure: Right Heart Cath;  Surgeon: Eh  MD Jackson;  Location:  HEART CARDIAC CATH LAB     INSERT PORT VASCULAR ACCESS Left 4/21/2021    Procedure: INSERTION, VASCULAR ACCESS PORT (NOT SURE ON SIDE UNTIL REMOVAL);  Surgeon: Rajan More MD;  Location: [facility] OR     IR CHEST PORT PLACEMENT > 5 YRS OF AGE  4/21/2021     IR CVC NON TUNNEL LINE REMOVAL  5/6/2021     IR CVC NON TUNNEL PLACEMENT  04/07/2020     IR CVC NON TUNNEL PLACEMENT  4/30/2021     IR PORT REMOVAL LEFT  4/21/2021     REMOVE PORT VASCULAR ACCESS Left 4/21/2021    Procedure: REMOVAL, VASCULAR ACCESS PORT LEFT;  Surgeon: Rajan More MD;  Location: [facility] OR     REPAIR TENDON ELBOW Right 10/02/2019    Procedure: Right Elbow Flexor Lengthening, Flexor Pronator Slide Of Wrist and Finger, Thumb Adductor Lengthening;  Surgeon: Anai Franco MD;  Location: [facility] OR     TONSILLECTOMY Bilateral 10/02/2019    Procedure: Bilateral Tonsillectomy;  Surgeon: Farhana Guy MD;  Location: [facility] OR     ZZC BREAST REDUCTION (INCLUDES LIPO) TIER 3 Bilateral 04/23/2019     acetaminophen (TYLENOL) 325 MG tablet  albuterol (PROVENTIL) (2.5 MG/3ML) 0.083% neb solution  albuterol (PROVENTIL) (2.5 MG/3ML) 0.083% neb solution  aspirin (ASA) 81 MG chewable tablet  budesonide-formoterol (SYMBICORT) 160-4.5 MCG/ACT Inhaler  deferasirox (JADENU) 360 MG tablet  EPINEPHrine (ANY BX GENERIC EQUIV) 0.3 MG/0.3ML injection 2-pack  Hydroxyurea 1000 MG TABS  ondansetron (ZOFRAN) 8 MG tablet  oxyCODONE IR (ROXICODONE) 15 MG tablet      Allergies   Allergen Reactions     Contrast Dye      Hives and breathing issues     Fish-Derived Products Hives     Seafood Hives     Diagnostic X-Ray Materials      Gadolinium      Family History  Family History   Problem Relation Age of Onset     Sickle Cell Trait Mother      Hypertension Mother      Asthma Mother      Sickle Cell Trait Father      Glaucoma No family hx of      Macular Degeneration No family hx of      Social History   Social History     Tobacco Use     Smoking  "status: Never Smoker     Smokeless tobacco: Never Used   Substance Use Topics     Alcohol use: Not Currently     Alcohol/week: 0.0 standard drinks     Drug use: Never      Past medical history, past surgical history, medications, allergies, family history, and social history were reviewed with the patient. No additional pertinent items.       Review of Systems  A complete review of systems was performed with pertinent positives and negatives noted in the HPI, and all other systems negative.    Physical Exam   BP: 128/77  Pulse: 118  Temp: 99.1  F (37.3  C)  Resp: 22  Height: 162.6 cm (5' 4\")  Weight: 77.1 kg (170 lb)  SpO2: 92 %  Physical Exam  Vitals and nursing note reviewed.   Constitutional:       General: She is not in acute distress.     Appearance: Normal appearance. She is not diaphoretic.   HENT:      Head: Atraumatic.      Mouth/Throat:      Pharynx: No oropharyngeal exudate.   Eyes:      General: No scleral icterus.     Pupils: Pupils are equal, round, and reactive to light.   Cardiovascular:      Rate and Rhythm: Normal rate and regular rhythm.      Heart sounds: Normal heart sounds.   Pulmonary:      Effort: No respiratory distress.      Breath sounds: Normal breath sounds.   Abdominal:      General: Bowel sounds are normal.      Palpations: Abdomen is soft.      Tenderness: There is no abdominal tenderness.   Musculoskeletal:         General: No tenderness.   Skin:     General: Skin is warm.      Findings: No rash.   Neurological:      General: No focal deficit present.      Mental Status: She is alert and oriented to person, place, and time.   Psychiatric:         Mood and Affect: Mood normal.         Behavior: Behavior normal.           ED Course      Procedures            EKG Interpretation:      Interpreted by Dmitri Thomas MD  Time reviewed: per Epic  Symptoms at time of EKG: SOB   Rhythm: sinus tachycardia  Rate: 110-120  Axis: Normal  Ectopy: none  Conduction: normal  ST Segments/ T Waves: " Non-specific ST-T wave changes  Q Waves: none  Comparison to prior: new sinus tach    Clinical Impression: sinus tachycardia                          Results for orders placed or performed during the hospital encounter of 09/05/22   Comprehensive metabolic panel     Status: Abnormal   Result Value Ref Range    Sodium 134 (L) 136 - 145 mmol/L    Potassium 4.0 3.4 - 5.3 mmol/L    Creatinine 0.59 0.51 - 0.95 mg/dL    Urea Nitrogen 4.8 (L) 6.0 - 20.0 mg/dL    Chloride 103 98 - 107 mmol/L    Carbon Dioxide (CO2) 19 (L) 22 - 29 mmol/L    Anion Gap 12 7 - 15 mmol/L    Glucose 130 (H) 70 - 99 mg/dL    Calcium 9.3 8.6 - 10.0 mg/dL    Protein Total 8.3 6.4 - 8.3 g/dL    Albumin 4.8 3.5 - 5.2 g/dL    Bilirubin Total 4.7 (H) <=1.2 mg/dL    Alkaline Phosphatase 103 35 - 104 U/L    AST 91 (H) 10 - 35 U/L    ALT 55 (H) 10 - 35 U/L    GFR Estimate >90 >60 mL/min/1.73m2   Lactic acid whole blood STAT     Status: Abnormal   Result Value Ref Range    Lactic Acid 0.6 (L) 0.7 - 2.0 mmol/L   [place] Draw     Status: None    Narrative    The following orders were created for panel order [place] Draw.  Procedure                               Abnormality         Status                     ---------                               -----------         ------                     Extra Blue Top Tube[[id number]]                              Final result               Extra Red Top Tube[[id number]]                               Final result               Extra Green Top (Lithium...[[id number]]                      Final result               Extra Purple Top Tube[[id number]]                            Final result                 Please view results for these tests on the individual orders.   CBC with platelets and differential     Status: Abnormal   Result Value Ref Range    WBC Count 26.7 (H) 4.0 - 11.0 10e3/uL    RBC Count 2.42 (L) 3.80 - 5.20 10e6/uL    Hemoglobin 7.5 (L) 11.7 - 15.7 g/dL    Hematocrit 20.5 (L) 35.0 - 47.0 %    MCV 85 78 - 100 fL     MCH 31.0 26.5 - 33.0 pg    MCHC 36.6 (H) 31.5 - 36.5 g/dL    RDW 25.2 (H) 10.0 - 15.0 %    Platelet Count 282 150 - 450 10e3/uL    % Neutrophils 88 %    % Lymphocytes 5 %    % Monocytes 6 %    % Eosinophils 0 %    % Basophils 0 %    % Immature Granulocytes 1 %    NRBCs per 100 WBC 4 (H) <1 /100    Absolute Neutrophils 23.4 (H) 1.6 - 8.3 10e3/uL    Absolute Lymphocytes 1.4 0.8 - 5.3 10e3/uL    Absolute Monocytes 1.5 (H) 0.0 - 1.3 10e3/uL    Absolute Eosinophils 0.0 0.0 - 0.7 10e3/uL    Absolute Basophils 0.1 0.0 - 0.2 10e3/uL    Absolute Immature Granulocytes 0.3 <=0.4 10e3/uL    Absolute NRBCs 1.1 10e3/uL   Extra Blue Top Tube     Status: None   Result Value Ref Range    Hold Specimen JIC    Extra Red Top Tube     Status: None   Result Value Ref Range    Hold Specimen JIC    Extra Green Top (Lithium Heparin) Tube     Status: None   Result Value Ref Range    Hold Specimen JIC    Extra Purple Top Tube     Status: None   Result Value Ref Range    Hold Specimen JIC    CBC with platelets differential     Status: Abnormal    Narrative    The following orders were created for panel order CBC with platelets differential.  Procedure                               Abnormality         Status                     ---------                               -----------         ------                     CBC with platelets and d...[[id number]]  Abnormal            Final result                 Please view results for these tests on the individual orders.   Results for orders placed or performed during the hospital encounter of 09/05/22   [place] Draw     Status: None    Narrative    The following orders were created for panel order [place] Draw.  Procedure                               Abnormality         Status                     ---------                               -----------         ------                     Extra Blue Top Tube[[id number]]                              Final result               Extra Red Top Tube[[id number]]                                Final result               Extra Green Top (Lithium...[[id number]]                      Final result               Extra Purple Top Tube[[id number]]                            Final result                 Please view results for these tests on the individual orders.   Extra Blue Top Tube     Status: None   Result Value Ref Range    Hold Specimen     Extra Red Top Tube     Status: None   Result Value Ref Range    Hold Specimen JIC    Extra Green Top (Lithium Heparin) Tube     Status: None   Result Value Ref Range    Hold Specimen JIC    Extra Purple Top Tube     Status: None   Result Value Ref Range    Hold Specimen JIC    Comprehensive metabolic panel     Status: Abnormal   Result Value Ref Range    Sodium 135 (L) 136 - 145 mmol/L    Potassium 4.1 3.4 - 5.3 mmol/L    Creatinine 0.66 0.51 - 0.95 mg/dL    Urea Nitrogen 6.9 6.0 - 20.0 mg/dL    Chloride 104 98 - 107 mmol/L    Carbon Dioxide (CO2) 19 (L) 22 - 29 mmol/L    Anion Gap 12 7 - 15 mmol/L    Glucose 136 (H) 70 - 99 mg/dL    Calcium 9.6 8.6 - 10.0 mg/dL    Protein Total 8.1 6.4 - 8.3 g/dL    Albumin 4.8 3.5 - 5.2 g/dL    Bilirubin Total 3.9 (H) <=1.2 mg/dL    Alkaline Phosphatase 94 35 - 104 U/L    AST 94 (H) 10 - 35 U/L    ALT 66 (H) 10 - 35 U/L    GFR Estimate >90 >60 mL/min/1.73m2   Reticulocyte count     Status: Abnormal   Result Value Ref Range    % Reticulocyte 22.2 (H) 0.5 - 2.0 %    Absolute Reticulocyte 0.536 (H) 0.025 - 0.095 10e6/uL   CBC with platelets and differential     Status: Abnormal   Result Value Ref Range    WBC Count 20.1 (H) 4.0 - 11.0 10e3/uL    RBC Count 2.41 (L) 3.80 - 5.20 10e6/uL    Hemoglobin 7.7 (L) 11.7 - 15.7 g/dL    Hematocrit 21.3 (L) 35.0 - 47.0 %    MCV 88 78 - 100 fL    MCH 32.0 26.5 - 33.0 pg    MCHC 36.2 31.5 - 36.5 g/dL    RDW 25.3 (H) 10.0 - 15.0 %    Platelet Count 339 150 - 450 10e3/uL    % Neutrophils 75 %    % Lymphocytes 14 %    % Monocytes 7 %    % Eosinophils 1 %    % Basophils 1 %    %  Immature Granulocytes 2 %    NRBCs per 100 WBC 5 (H) <1 /100    Absolute Neutrophils 15.1 (H) 1.6 - 8.3 10e3/uL    Absolute Lymphocytes 2.8 0.8 - 5.3 10e3/uL    Absolute Monocytes 1.5 (H) 0.0 - 1.3 10e3/uL    Absolute Eosinophils 0.2 0.0 - 0.7 10e3/uL    Absolute Basophils 0.3 (H) 0.0 - 0.2 10e3/uL    Absolute Immature Granulocytes 0.3 <=0.4 10e3/uL    Absolute NRBCs 0.9 10e3/uL   CBC with platelets differential     Status: Abnormal    Narrative    The following orders were created for panel order CBC with platelets differential.  Procedure                               Abnormality         Status                     ---------                               -----------         ------                     CBC with platelets and d...[[id number]]  Abnormal            Final result                 Please view results for these tests on the individual orders.     Medications   sodium chloride (PF) 0.9% PF flush 3 mL (has no administration in time range)   sodium chloride (PF) 0.9% PF flush 3 mL (has no administration in time range)   HYDROmorphone (DILAUDID) injection 1 mg (has no administration in time range)   0.9% sodium chloride BOLUS (has no administration in time range)     Followed by   sodium chloride 0.9% infusion (has no administration in time range)        Assessments & Plan (with Medical Decision Making)     22 year old female with PMH significant for sickle cell disease, prior cerebral infarction in 2015 with resulting right-sided hemiplegia and hemiparesis, right hand contractures, cognitive delay, and prior PEs who presents to the ED for evaluation of sickle cell pain crisis with new chest pain and shortness of breath since leaving the ER early this morning. Vital signs in triage notable for hypoxia on room air 93%, elevated heart rate of 119, respirations of 22 but otherwise normal blood pressure 128/77, mild temperature elevation of 99.1.  Patient's oxygen level normalized with initially 2 L of nasal cannula  supplemental and then increased to 5 L oxygen requirement.  IV established, labs drawn sent reviewed and document in epic and remarkable for leukocytosis with white count of 26.7, anemia with hemoglobin 7.5, procalcitonin elevated 0.23, D-dimer elevated 2.4 but otherwise unremarkable CBC electrolytes, lactic acid normal at 0.6.  X-ray chest was obtained interpreted by me which revealed bilateral basilar opacities.  Patient was given Dilaudid for analgesia and a liter normal saline IV fluid bolus.  She is also given Zosyn for empiric coverage for antibiotics.  She had a bilateral lower extremity ultrasound obtained which revealed no evidence of DVT, however given her prior history of PEs without anticoagulation currently and her contrast dye allergy inability to obtain definitive study hemorrhage department I went ahead and anticoagulated her empirically.  Case discussed with hematology with agreement on plan.  Case also discussed with inpatient service with plan to admit to medicine for acute chest versus PE versus underlying pneumonia.  Follow up with the patient.    New Prescriptions    No medications on file       Final diagnoses:   Acute chest syndrome (H)     Korey NEWMAN, am serving as a trained medical scribe to document services personally performed by Dmitri Thomas MD, based on the provider's statements to me.     IDmitri MD, was physically present and have reviewed and verified the accuracy of this note documented by Korey De La Paz.    --  Dmitri Thomas MD  [facility] EMERGENCY DEPARTMENT  9/5/2022     Dmitri Thomas MD  09/06/22 0046

## 2022-09-06 NOTE — ED TRIAGE NOTES
BIBA  From home  Pt here today  Sickle cell, came in late last night discharged  Was going on for three days  Got ketamine here  Been home for 3-4 hours and pain was back  Pt had shortness of breath and asthma  1mg dilaudid IM still had pain  2L 94-95%  87% RA   150/70 to 120/60  Pulse 115  Pain is all over

## 2022-09-07 ENCOUNTER — APPOINTMENT (OUTPATIENT)
Dept: LAB | Facility: CLINIC | Age: 22
DRG: 811 | End: 2022-09-07
Payer: COMMERCIAL

## 2022-09-07 ENCOUNTER — APPOINTMENT (OUTPATIENT)
Dept: INTERVENTIONAL RADIOLOGY/VASCULAR | Facility: CLINIC | Age: 22
DRG: 811 | End: 2022-09-07
Attending: PHYSICIAN ASSISTANT
Payer: COMMERCIAL

## 2022-09-07 LAB
ALBUMIN SERPL BCG-MCNC: 3.5 G/DL (ref 3.5–5.2)
ALP SERPL-CCNC: 86 U/L (ref 35–104)
ALT SERPL W P-5'-P-CCNC: 30 U/L (ref 10–35)
ANION GAP SERPL CALCULATED.3IONS-SCNC: 9 MMOL/L (ref 7–15)
AST SERPL W P-5'-P-CCNC: 58 U/L (ref 10–35)
BILIRUB SERPL-MCNC: 4.9 MG/DL
BLD PROD TYP BPU: NORMAL
BLOOD COMPONENT TYPE: NORMAL
BUN SERPL-MCNC: 3.9 MG/DL (ref 6–20)
CALCIUM SERPL-MCNC: 8.2 MG/DL (ref 8.6–10)
CHLORIDE SERPL-SCNC: 105 MMOL/L (ref 98–107)
CODING SYSTEM: NORMAL
CREAT SERPL-MCNC: 0.52 MG/DL (ref 0.51–0.95)
CROSSMATCH: NORMAL
DEPRECATED HCO3 PLAS-SCNC: 20 MMOL/L (ref 22–29)
ENTEROCOCCUS FAECALIS: NOT DETECTED
ENTEROCOCCUS FAECIUM: NOT DETECTED
ERYTHROCYTE [DISTWIDTH] IN BLOOD BY AUTOMATED COUNT: 20.8 % (ref 10–15)
GFR SERPL CREATININE-BSD FRML MDRD: >90 ML/MIN/1.73M2
GLUCOSE SERPL-MCNC: 98 MG/DL (ref 70–99)
HCT VFR BLD AUTO: 23.8 % (ref 35–47)
HCT VFR BLD AUTO: 23.9 % (ref 35–47)
HCT VFR BLD AUTO: 25.7 % (ref 35–47)
HGB BLD-MCNC: 8.1 G/DL (ref 11.7–15.7)
HGB BLD-MCNC: 8.3 G/DL (ref 11.7–15.7)
HGB BLD-MCNC: 8.6 G/DL (ref 11.7–15.7)
ISSUE DATE AND TIME: NORMAL
LISTERIA SPECIES (DETECTED/NOT DETECTED): NOT DETECTED
MCH RBC QN AUTO: 30.4 PG (ref 26.5–33)
MCHC RBC AUTO-ENTMCNC: 34.7 G/DL (ref 31.5–36.5)
MCV RBC AUTO: 88 FL (ref 78–100)
MRSA DNA SPEC QL NAA+PROBE: POSITIVE
PLATELET # BLD AUTO: 310 10E3/UL (ref 150–450)
POTASSIUM SERPL-SCNC: 3.5 MMOL/L (ref 3.4–5.3)
PROT SERPL-MCNC: 6.3 G/DL (ref 6.4–8.3)
RBC # BLD AUTO: 2.73 10E6/UL (ref 3.8–5.2)
SA TARGET DNA: POSITIVE
SODIUM SERPL-SCNC: 134 MMOL/L (ref 136–145)
STAPHYLOCOCCUS AUREUS: NOT DETECTED
STAPHYLOCOCCUS EPIDERMIDIS: DETECTED
STAPHYLOCOCCUS LUGDUNENSIS: NOT DETECTED
STREPTOCOCCUS AGALACTIAE: NOT DETECTED
STREPTOCOCCUS ANGINOSUS GROUP: NOT DETECTED
STREPTOCOCCUS PNEUMONIAE: NOT DETECTED
STREPTOCOCCUS PYOGENES: NOT DETECTED
STREPTOCOCCUS SPECIES: NOT DETECTED
UFH PPP CHRO-ACNC: 0.18 IU/ML
UFH PPP CHRO-ACNC: >1.1 IU/ML
UFH PPP CHRO-ACNC: >1.1 IU/ML
UNIT ABO/RH: NORMAL
UNIT NUMBER: NORMAL
UNIT STATUS: NORMAL
UNIT TYPE ISBT: 9500
WBC # BLD AUTO: 19 10E3/UL (ref 4–11)

## 2022-09-07 PROCEDURE — 85520 HEPARIN ASSAY: CPT | Performed by: STUDENT IN AN ORGANIZED HEALTH CARE EDUCATION/TRAINING PROGRAM

## 2022-09-07 PROCEDURE — 87040 BLOOD CULTURE FOR BACTERIA: CPT | Performed by: INTERNAL MEDICINE

## 2022-09-07 PROCEDURE — 250N000009 HC RX 250: Performed by: PHYSICIAN ASSISTANT

## 2022-09-07 PROCEDURE — 36556 INSERT NON-TUNNEL CV CATH: CPT

## 2022-09-07 PROCEDURE — C1769 GUIDE WIRE: HCPCS

## 2022-09-07 PROCEDURE — 250N000011 HC RX IP 250 OP 636: Performed by: STUDENT IN AN ORGANIZED HEALTH CARE EDUCATION/TRAINING PROGRAM

## 2022-09-07 PROCEDURE — 85018 HEMOGLOBIN: CPT | Performed by: PATHOLOGY

## 2022-09-07 PROCEDURE — 250N000011 HC RX IP 250 OP 636: Performed by: EMERGENCY MEDICINE

## 2022-09-07 PROCEDURE — 36591 DRAW BLOOD OFF VENOUS DEVICE: CPT | Performed by: STUDENT IN AN ORGANIZED HEALTH CARE EDUCATION/TRAINING PROGRAM

## 2022-09-07 PROCEDURE — 36556 INSERT NON-TUNNEL CV CATH: CPT | Mod: LT | Performed by: PHYSICIAN ASSISTANT

## 2022-09-07 PROCEDURE — 258N000003 HC RX IP 258 OP 636: Performed by: INTERNAL MEDICINE

## 2022-09-07 PROCEDURE — 85520 HEPARIN ASSAY: CPT | Performed by: HOSPITALIST

## 2022-09-07 PROCEDURE — 250N000013 HC RX MED GY IP 250 OP 250 PS 637: Performed by: STUDENT IN AN ORGANIZED HEALTH CARE EDUCATION/TRAINING PROGRAM

## 2022-09-07 PROCEDURE — 250N000009 HC RX 250: Performed by: PATHOLOGY

## 2022-09-07 PROCEDURE — 83605 ASSAY OF LACTIC ACID: CPT | Performed by: STUDENT IN AN ORGANIZED HEALTH CARE EDUCATION/TRAINING PROGRAM

## 2022-09-07 PROCEDURE — 258N000003 HC RX IP 258 OP 636: Performed by: STUDENT IN AN ORGANIZED HEALTH CARE EDUCATION/TRAINING PROGRAM

## 2022-09-07 PROCEDURE — P9016 RBC LEUKOCYTES REDUCED: HCPCS | Performed by: PHYSICIAN ASSISTANT

## 2022-09-07 PROCEDURE — 999N000248 HC STATISTIC IV INSERT WITH US BY RN

## 2022-09-07 PROCEDURE — 77001 FLUOROGUIDE FOR VEIN DEVICE: CPT | Mod: 26 | Performed by: PHYSICIAN ASSISTANT

## 2022-09-07 PROCEDURE — 36415 COLL VENOUS BLD VENIPUNCTURE: CPT | Performed by: HOSPITALIST

## 2022-09-07 PROCEDURE — 272N000504 HC NEEDLE CR4

## 2022-09-07 PROCEDURE — 99233 SBSQ HOSP IP/OBS HIGH 50: CPT | Performed by: STUDENT IN AN ORGANIZED HEALTH CARE EDUCATION/TRAINING PROGRAM

## 2022-09-07 PROCEDURE — 250N000011 HC RX IP 250 OP 636: Performed by: PHYSICIAN ASSISTANT

## 2022-09-07 PROCEDURE — 272N000192 HC ACCESSORY CR2

## 2022-09-07 PROCEDURE — 258N000003 HC RX IP 258 OP 636: Performed by: EMERGENCY MEDICINE

## 2022-09-07 PROCEDURE — 83021 HEMOGLOBIN CHROMOTOGRAPHY: CPT | Performed by: PATHOLOGY

## 2022-09-07 PROCEDURE — 87077 CULTURE AEROBIC IDENTIFY: CPT | Performed by: STUDENT IN AN ORGANIZED HEALTH CARE EDUCATION/TRAINING PROGRAM

## 2022-09-07 PROCEDURE — 36415 COLL VENOUS BLD VENIPUNCTURE: CPT | Performed by: STUDENT IN AN ORGANIZED HEALTH CARE EDUCATION/TRAINING PROGRAM

## 2022-09-07 PROCEDURE — 36592 COLLECT BLOOD FROM PICC: CPT | Performed by: STUDENT IN AN ORGANIZED HEALTH CARE EDUCATION/TRAINING PROGRAM

## 2022-09-07 PROCEDURE — 250N000009 HC RX 250: Performed by: STUDENT IN AN ORGANIZED HEALTH CARE EDUCATION/TRAINING PROGRAM

## 2022-09-07 PROCEDURE — 36415 COLL VENOUS BLD VENIPUNCTURE: CPT | Performed by: INTERNAL MEDICINE

## 2022-09-07 PROCEDURE — 250N000009 HC RX 250: Performed by: INTERNAL MEDICINE

## 2022-09-07 PROCEDURE — C1752 CATH,HEMODIALYSIS,SHORT-TERM: HCPCS

## 2022-09-07 PROCEDURE — 87641 MR-STAPH DNA AMP PROBE: CPT | Performed by: STUDENT IN AN ORGANIZED HEALTH CARE EDUCATION/TRAINING PROGRAM

## 2022-09-07 PROCEDURE — 36512 APHERESIS RBC: CPT

## 2022-09-07 PROCEDURE — 05HN33Z INSERTION OF INFUSION DEVICE INTO LEFT INTERNAL JUGULAR VEIN, PERCUTANEOUS APPROACH: ICD-10-PCS | Performed by: PHYSICIAN ASSISTANT

## 2022-09-07 PROCEDURE — 76937 US GUIDE VASCULAR ACCESS: CPT | Mod: 26 | Performed by: PHYSICIAN ASSISTANT

## 2022-09-07 PROCEDURE — 87040 BLOOD CULTURE FOR BACTERIA: CPT | Performed by: STUDENT IN AN ORGANIZED HEALTH CARE EDUCATION/TRAINING PROGRAM

## 2022-09-07 PROCEDURE — 80053 COMPREHEN METABOLIC PANEL: CPT | Performed by: STUDENT IN AN ORGANIZED HEALTH CARE EDUCATION/TRAINING PROGRAM

## 2022-09-07 PROCEDURE — 250N000011 HC RX IP 250 OP 636: Performed by: PATHOLOGY

## 2022-09-07 PROCEDURE — 85027 COMPLETE CBC AUTOMATED: CPT | Performed by: STUDENT IN AN ORGANIZED HEALTH CARE EDUCATION/TRAINING PROGRAM

## 2022-09-07 PROCEDURE — 120N000002 HC R&B MED SURG/OB UMMC

## 2022-09-07 RX ORDER — NALOXONE HYDROCHLORIDE 0.4 MG/ML
0.4 INJECTION, SOLUTION INTRAMUSCULAR; INTRAVENOUS; SUBCUTANEOUS
Status: DISCONTINUED | OUTPATIENT
Start: 2022-09-07 | End: 2022-09-13 | Stop reason: HOSPADM

## 2022-09-07 RX ORDER — HEPARIN SODIUM (PORCINE) LOCK FLUSH IV SOLN 100 UNIT/ML 100 UNIT/ML
3 SOLUTION INTRAVENOUS ONCE
Status: COMPLETED | OUTPATIENT
Start: 2022-09-07 | End: 2022-09-07

## 2022-09-07 RX ORDER — HEPARIN SODIUM (PORCINE) LOCK FLUSH IV SOLN 100 UNIT/ML 100 UNIT/ML
3 SOLUTION INTRAVENOUS EVERY 24 HOURS
Status: DISCONTINUED | OUTPATIENT
Start: 2022-09-07 | End: 2022-09-13 | Stop reason: HOSPADM

## 2022-09-07 RX ORDER — DIPHENHYDRAMINE HYDROCHLORIDE 50 MG/ML
50 INJECTION INTRAMUSCULAR; INTRAVENOUS
Status: COMPLETED | OUTPATIENT
Start: 2022-09-07 | End: 2022-09-07

## 2022-09-07 RX ORDER — HEPARIN SODIUM (PORCINE) LOCK FLUSH IV SOLN 100 UNIT/ML 100 UNIT/ML
3 SOLUTION INTRAVENOUS EVERY 24 HOURS
Status: CANCELLED | OUTPATIENT
Start: 2022-09-07

## 2022-09-07 RX ORDER — KETAMINE HYDROCHLORIDE 10 MG/ML
2 INJECTION, SOLUTION INTRAMUSCULAR; INTRAVENOUS ONCE
Status: COMPLETED | OUTPATIENT
Start: 2022-09-07 | End: 2022-09-07

## 2022-09-07 RX ORDER — NALOXONE HYDROCHLORIDE 0.4 MG/ML
0.2 INJECTION, SOLUTION INTRAMUSCULAR; INTRAVENOUS; SUBCUTANEOUS
Status: DISCONTINUED | OUTPATIENT
Start: 2022-09-07 | End: 2022-09-13 | Stop reason: HOSPADM

## 2022-09-07 RX ORDER — HEPARIN SODIUM (PORCINE) LOCK FLUSH IV SOLN 100 UNIT/ML 100 UNIT/ML
3 SOLUTION INTRAVENOUS
Status: CANCELLED | OUTPATIENT
Start: 2022-09-07

## 2022-09-07 RX ORDER — HEPARIN SODIUM (PORCINE) LOCK FLUSH IV SOLN 100 UNIT/ML 100 UNIT/ML
3 SOLUTION INTRAVENOUS
Status: DISCONTINUED | OUTPATIENT
Start: 2022-09-07 | End: 2022-09-13 | Stop reason: HOSPADM

## 2022-09-07 RX ORDER — LIDOCAINE HYDROCHLORIDE 10 MG/ML
1-30 INJECTION, SOLUTION EPIDURAL; INFILTRATION; INTRACAUDAL; PERINEURAL
Status: COMPLETED | OUTPATIENT
Start: 2022-09-07 | End: 2022-09-07

## 2022-09-07 RX ORDER — VANCOMYCIN HYDROCHLORIDE 1 G/200ML
1000 INJECTION, SOLUTION INTRAVENOUS EVERY 8 HOURS
Status: DISCONTINUED | OUTPATIENT
Start: 2022-09-07 | End: 2022-09-08

## 2022-09-07 RX ORDER — CEFTRIAXONE 2 G/1
2 INJECTION, POWDER, FOR SOLUTION INTRAMUSCULAR; INTRAVENOUS EVERY 24 HOURS
Status: DISCONTINUED | OUTPATIENT
Start: 2022-09-07 | End: 2022-09-12

## 2022-09-07 RX ADMIN — VANCOMYCIN HYDROCHLORIDE 1000 MG: 1 INJECTION, SOLUTION INTRAVENOUS at 18:58

## 2022-09-07 RX ADMIN — CEFTRIAXONE SODIUM 1 G: 1 INJECTION, POWDER, FOR SOLUTION INTRAMUSCULAR; INTRAVENOUS at 08:55

## 2022-09-07 RX ADMIN — DIPHENHYDRAMINE HYDROCHLORIDE 50 MG: 50 INJECTION, SOLUTION INTRAMUSCULAR; INTRAVENOUS at 17:55

## 2022-09-07 RX ADMIN — OXYCODONE HYDROCHLORIDE 20 MG: 10 TABLET ORAL at 10:35

## 2022-09-07 RX ADMIN — OXYCODONE HYDROCHLORIDE 20 MG: 10 TABLET ORAL at 05:06

## 2022-09-07 RX ADMIN — HEPARIN SODIUM AND DEXTROSE 1150 UNITS/HR: 10000; 5 INJECTION INTRAVENOUS at 11:27

## 2022-09-07 RX ADMIN — LIDOCAINE HYDROCHLORIDE 7 ML: 10 INJECTION, SOLUTION EPIDURAL; INFILTRATION; INTRACAUDAL; PERINEURAL at 15:59

## 2022-09-07 RX ADMIN — ASPIRIN 81 MG CHEWABLE TABLET 81 MG: 81 TABLET CHEWABLE at 08:47

## 2022-09-07 RX ADMIN — ASPIRIN 81 MG CHEWABLE TABLET 81 MG: 81 TABLET CHEWABLE at 20:55

## 2022-09-07 RX ADMIN — Medication 2 ML: at 16:02

## 2022-09-07 RX ADMIN — HYDROXYUREA 3000 MG: 500 CAPSULE ORAL at 08:49

## 2022-09-07 RX ADMIN — HYDROMORPHONE HYDROCHLORIDE 1 MG: 1 INJECTION, SOLUTION INTRAMUSCULAR; INTRAVENOUS; SUBCUTANEOUS at 12:07

## 2022-09-07 RX ADMIN — CEFTRIAXONE SODIUM 2 G: 2 INJECTION, POWDER, FOR SOLUTION INTRAMUSCULAR; INTRAVENOUS at 16:37

## 2022-09-07 RX ADMIN — VANCOMYCIN HYDROCHLORIDE 1000 MG: 1 INJECTION, SOLUTION INTRAVENOUS at 12:10

## 2022-09-07 RX ADMIN — ANTICOAGULANT CITRATE DEXTROSE SOLUTION FORMULA A 428 ML: 12.25; 11; 3.65 SOLUTION INTRAVENOUS at 19:55

## 2022-09-07 RX ADMIN — SODIUM CHLORIDE: 9 INJECTION, SOLUTION INTRAVENOUS at 13:25

## 2022-09-07 RX ADMIN — FLUTICASONE FUROATE AND VILANTEROL TRIFENATATE 1 PUFF: 200; 25 POWDER RESPIRATORY (INHALATION) at 09:01

## 2022-09-07 RX ADMIN — KETAMINE HYDROCHLORIDE 4 MG/HR: 100 INJECTION, SOLUTION, CONCENTRATE INTRAMUSCULAR; INTRAVENOUS at 02:26

## 2022-09-07 RX ADMIN — VANCOMYCIN HYDROCHLORIDE 1750 MG: 1 INJECTION, POWDER, LYOPHILIZED, FOR SOLUTION INTRAVENOUS at 05:41

## 2022-09-07 RX ADMIN — AZITHROMYCIN MONOHYDRATE 500 MG: 250 TABLET ORAL at 05:06

## 2022-09-07 RX ADMIN — HYDROMORPHONE HYDROCHLORIDE 1 MG: 1 INJECTION, SOLUTION INTRAMUSCULAR; INTRAVENOUS; SUBCUTANEOUS at 16:30

## 2022-09-07 RX ADMIN — Medication 3 ML: at 19:54

## 2022-09-07 RX ADMIN — KETAMINE HYDROCHLORIDE 2 MG: 10 INJECTION, SOLUTION INTRAMUSCULAR; INTRAVENOUS at 02:04

## 2022-09-07 RX ADMIN — Medication 2 ML: at 16:03

## 2022-09-07 RX ADMIN — Medication 3 ML: at 19:55

## 2022-09-07 RX ADMIN — SODIUM CHLORIDE: 9 INJECTION, SOLUTION INTRAVENOUS at 21:05

## 2022-09-07 RX ADMIN — HYDROMORPHONE HYDROCHLORIDE 1 MG: 1 INJECTION, SOLUTION INTRAMUSCULAR; INTRAVENOUS; SUBCUTANEOUS at 20:55

## 2022-09-07 RX ADMIN — KETAMINE HYDROCHLORIDE 8 MG/HR: 100 INJECTION, SOLUTION, CONCENTRATE INTRAMUSCULAR; INTRAVENOUS at 16:55

## 2022-09-07 ASSESSMENT — ACTIVITIES OF DAILY LIVING (ADL)
ADLS_ACUITY_SCORE: 23
ADLS_ACUITY_SCORE: 23
ADLS_ACUITY_SCORE: 26
ADLS_ACUITY_SCORE: 26
ADLS_ACUITY_SCORE: 23

## 2022-09-07 NOTE — PROVIDER NOTIFICATION
Lab contacted writer to say that pt's positive blood cultures show MRSE,  stated that this result may be from possible contamination. Please advise. Will continue with POC per orders.

## 2022-09-07 NOTE — CONSULTS
"    Interventional Radiology Consult Service Note  09/07/22   Patient is on IR schedule 09/07/22 for a IR non tunneled apheresis capable line possibly via femoral approach  Labs WNL for procedure.   No NPO required.  Medications to be held include: IV heparin hold on call to IR   Consent will be done prior to procedure.     Please [place] the [place] RN [place] [phone] for estimated time of procedure.     Case discussed with IR attending Dr. Randal Galloway MD.     This is a 22 year old female with Sickle cell crisis here for acute chest pain syndrome will need exchange transfusion today and currently on heparin ggt for possible PE.    D/w team and pt has history of difficult access for right non tunneled line placement so port was placed via the left internal jugular.  Last non tunneled line placement was left femoral access.  D/w team that we may have to do the same for this patient.  Needs transfusion exchange today.      Pertinent Imaging:      Expected date of discharge: TBD    Vitals:   BP (!) 145/85 (BP Location: Right arm)   Pulse 115   Temp 99.3  F (37.4  C) (Oral)   Resp 28   Ht 1.626 m (5' 4\")   Wt 77.1 kg (170 lb)   SpO2 97%   BMI 29.18 kg/m      Pertinent Labs:     Lab Results   Component Value Date    WBC 19.0 (H) 09/07/2022    WBC 21.4 (H) 09/06/2022    WBC 26.7 (H) 09/05/2022    WBC 15.8 (H) 07/10/2021    WBC 18.0 (H) 07/08/2021    WBC 15.1 (H) 07/04/2021     Lab Results   Component Value Date    HGB 8.3 09/07/2022    HGB 9.6 09/06/2022    HGB 6.6 09/06/2022    HGB 7.8 07/10/2021    HGB 8.1 07/08/2021    HGB 8.2 07/04/2021     Lab Results   Component Value Date     09/07/2022     09/06/2022     09/05/2022     07/10/2021     07/08/2021     07/04/2021     Lab Results   Component Value Date    INR 1.35 (H) 03/06/2022    INR 1.28 (H) 04/03/2021    PTT 34 01/29/2022     (HH) 04/03/2021     Lab Results   Component Value Date    POTASSIUM 3.5 09/07/2022    " POTASSIUM 4.0 08/29/2022    POTASSIUM 3.7 11/11/2021    POTASSIUM 3.9 07/10/2021        HCG Quantitative Serum   Date Value Ref Range Status   04/30/2021 <1 0 - 5 IU/L Final     hCG Quantitative   Date Value Ref Range Status   01/29/2022 <1 0 - 5 IU/L Final     Comment:     Adult:0-5 IU/L for healthy non-pregnant person  Neonates:Should be within normal ranges by 2 days after birth       COVID-19 Antibody Results, Testing for Immunity    COVID-19 Antibody Results, Testing for Immunity   No data to display.         COVID-19 PCR Results    COVID-19 PCR Results 4/17/21 4/17/21 4/26/21 4/26/21 4/26/21 5/3/21 7/13/21 11/10/21 1/29/22 2/28/22 3/25/22 8/20/22 9/5/22    1435 1435 1456 1456 2145           COVID-19 Virus PCR to [facility] - Result Test received-See reflex to IDDL test SARS CoV2 (COVID-19) Virus RT-PCR  Test received-See reflex to IDDL test SARS CoV2 (COVID-19) Virus RT-PCR             COVID-19 Virus PCR to [facility] - Source Nasopharyngeal  Nasopharyngeal             SARS-CoV-2 Virus Specimen Source  Nasopharyngeal  Nasopharyngeal Nasopharyngeal Nasopharyngeal          Flu A/B & SARS-COV-2 PCR Source                SARS-CoV-2 PCR Result  NEGATIVE  NEGATIVE NEGATIVE NEGATIVE          SARS CoV2 PCR       Negative Negative Negative Negative Negative Negative Negative      Comments are available for some flowsheets but are not being displayed.             Michelle Johnson PA-C  Interventional Radiology  Pager: [phone]

## 2022-09-07 NOTE — PROGRESS NOTES
"PT Name: Mine Wilkins  MR #: [id number]    Physician Query Form - Hematology Clarification      CDS/: Emma Whitmore RN               Contact information:[email or web address]    This form is a permanent document in the medical record.      Query Date: May 23, 2018    By submitting this query, we are merely seeking further clarification of documentation. Please utilize your independent clinical judgment when addressing the question(s) below.    The Medical record contains the following:   Indicators  Supporting Clinical Findings Location in Medical Record    "Anemia" documented     x H & H =   5/19/2018 18:29 5/20/2018 10:54 5/21/2018 03:33 5/22/2018 05:04 5/23/2018 05:01   11.5 (L) 10.3 (L) 8.3 (L) 9.0 (L) 8.2 (L)   34.4 (L) 31.4 (L) 25.1 (L) 27.0 (L) 25.2 (L)    Labs 5/19-23    BP =                     HR=      "GI bleeding" documented      Acute bleeding (Non GI site)      Transfusion(s)      Treatment:     x Other:  Right femur intertrochanteric fracture with reverse oblique extension, unstable.Procedure: Right femur cephalomedullary nailing. EBL : 200 ml    Age related osteoporosis,   UTI without hematuria OP note 5/20        Hospital Medicine PN 5/22  H&P     Provider, please specify diagnosis or diagnoses associated with above clinical findings.    [x  ] Acute blood loss anemia expected post-operatively  [  ] Acute blood loss anemia  [  ] Iron deficiency anemia  [  ] Chronic blood loss anemia  [  ] Precipitous drop in Hematocrit  [  ] Anemia of chronic disease ( Specify chronic disease)      [  ] Other (Specify) _______________________________     [  ] Clinically Undetermined     [  ] Other Hematological Diagnosis (please specify): _________________________________    [  ] Clinically Undetermined       Please document in your progress notes daily for the duration of treatment, until resolved, and include in your discharge summary.                                                            " Admission/Transfer from: [facility]  2 RN skin assessment completed. Yes  Significant findings include: circular scab to top of left foot   WOC Nurse Consult Ordered? No

## 2022-09-07 NOTE — PHARMACY-VANCOMYCIN DOSING SERVICE
Pharmacy Vancomycin Initial Note  Date of Service 2022  Patient's  2000  22 year old, female    Indication: Sepsis    Current estimated CrCl = Estimated Creatinine Clearance: 144.2 mL/min (based on SCr of 0.61 mg/dL).    Creatinine for last 3 days  2022:  5:35 AM Creatinine 0.66 mg/dL;  8:22 PM Creatinine 0.59 mg/dL  2022:  6:42 AM Creatinine 0.61 mg/dL    Recent Vancomycin Level(s) for last 3 days  No results found for requested labs within last 72 hours.      Vancomycin IV Administrations (past 72 hours)      No vancomycin orders with administrations in past 72 hours.                Nephrotoxins and other renal medications (From now, onward)    Start     Dose/Rate Route Frequency Ordered Stop    22 0330  vancomycin (VANCOCIN) 1,750 mg in sodium chloride 0.9 % 500 mL intermittent infusion         1,750 mg  over 120 Minutes Intravenous ONCE 22 0259            Contrast Orders - past 72 hours (72h ago, onward)    None          [employer] Prediction of Planned Initial Vancomycin Regimen  Loading dose: 1750 mg at 03:30 2022.  Regimen: 1000 mg IV every 8 hours.  Start time: 1130 on 2022  Exposure target: AUC24 (range)400-600 mg/L.hr   AUC24,ss: 555 mg/L.hr  Probability of AUC24 > 400: 80 %  Ctrough,ss: 17.7 mg/L  Probability of Ctrough,ss > 20: 40 %  Probability of nephrotoxicity (Lodise ANA LAURA ): 14 %          Plan:  1. Start vancomycin  1750 mg IV once, followed by 1000 mg IV q8h.   2. Vancomycin monitoring method: AUC  3. Vancomycin therapeutic monitoring goal: 400-600 mg*h/L  4. Pharmacy will check vancomycin levels as appropriate in 1-3 Days.    5. Serum creatinine levels will be ordered daily for the first week of therapy and at least twice weekly for subsequent weeks.      Jace Berry [facility]

## 2022-09-07 NOTE — CONSULTS
Transfusion Medicine Consultation Note   Jennifer NEWMAN Billy [id number]   2000 22 year old   Reason for consult:  Request for initiation of a procedure, Red Cell Exchange therapy     Assessment and Plan:   22 year old female with past medical history of   Past Medical History:   Diagnosis Date     Anxiety      Bleeding disorder (H)      Blood clotting disorder (H)      Cerebral infarction (H) 2015     Cognitive developmental delay     low IQ. Please recognize when managing pain and planning with her     Depressive disorder      Hemiplegia and hemiparesis following cerebral infarction affecting right dominant side (H)     right hand contractures     Iron overload due to repeated red blood cell transfusions      Migraines      Multiple subsegmental pulmonary emboli without acute cor pulmonale (H) 02/01/2021     Oppositional defiant behavior      Superficial venous thrombosis of arm, right 03/25/2021     Uncomplicated asthma    now presents for consultation for Red Cell Exchange secondary to sickle cell disease. Patient presents with shortness of breath and chest pain with concern for acute chest pain syndrome and also concern for infection vs PE.       The procedure, with the risks and benefits, were explained to the patient.  All questions were answered.  The patient has had previous Red Cell exchanges and has tolerated them well. The procedure, risks/benefits were explained to the patient and the patient had no questions. Patient does not have adequate veins as confirmed by our apheresis nurse and patient will require a dialysis compatible central line in order to perform the procedure.     The plan is to perform one Red Cell Exchange and then the clinical team to re-assess. I have ordered eight units of packed RBCs, Rh and K negative, hemoglobin S negative, fresh red blood cells.  Likely procedure will only use 7 of those 8 units.  Will have one unit extra on hand in case needed after the procedure.  "  Subjective:   The patient states chest pain, fatigue, SOB, and other symptoms as described in the primary clinical teams previous notes, but these are ongoing and have not changed since clinical team has addressed them.   Social History:     Social History     Socioeconomic History     Marital status: Single     Spouse name: Not on file     Number of children: Not on file     Years of education: Not on file     Highest education level: Not on file   Occupational History     Not on file   Tobacco Use     Smoking status: Never Smoker     Smokeless tobacco: Never Used   Substance and Sexual Activity     Alcohol use: Not Currently     Alcohol/week: 0.0 standard drinks     Drug use: Never     Sexual activity: Not Currently     Partners: Male     Birth control/protection: Other   Other Topics Concern     Parent/sibling w/ CABG, MI or angioplasty before 65F 55M? Not Asked   Social History Narrative    Lives with mom and extended family but \"toxic environment\" per her report. She would like to move out but cannot financially do so. She has minimal support at home despite her significant SCD comorbidities and cognitive delay from stroke.     Social Determinants of Health     Financial Resource Strain: Not on file   Food Insecurity: Not on file   Transportation Needs: Not on file   Physical Activity: Not on file   Stress: Not on file   Social Connections: Not on file   Intimate Partner Violence: Not At Risk     Fear of Current or Ex-Partner: No     Emotionally Abused: No     Physically Abused: No     Sexually Abused: No   Housing Stability: Not on file        Allergies:      Allergies   Allergen Reactions     Contrast Dye      Hives and breathing issues     Fish-Derived Products Hives     Seafood Hives     Diagnostic X-Ray Materials      Gadolinium        Medications:     Current Facility-Administered Medications   Medication     acetaminophen (TYLENOL) tablet 975 mg     albuterol (PROVENTIL) neb solution 5 mg     aspirin " (ASA) chewable tablet 81 mg     azithromycin (ZITHROMAX) tablet 500 mg     cefTRIAXone (ROCEPHIN) 2 g vial to attach to  ml bag for ADULTS or NS 50 ml bag for PEDS     fluticasone-vilanterol (BREO ELLIPTA) 200-25 MCG/INH inhaler 1 puff     heparin 100 UNIT/ML injection 3 mL     heparin 100 UNIT/ML injection 3 mL     heparin infusion 25,000 units in D5W 250 mL ANTICOAGULANT     HYDROmorphone (DILAUDID) injection 1 mg     hydroxyurea (HYDREA) capsule 3,000 mg     ketamine (KETALAR) 2 mg/mL in sodium chloride 0.9 % 50 mL ANALGESIA infusion     lidocaine (LMX4) cream     lidocaine (PF) (XYLOCAINE) 1 % injection 1-30 mL     lidocaine 1 % 1 mL     melatonin tablet 1 mg     naloxone (NARCAN) injection 0.2 mg    Or     naloxone (NARCAN) injection 0.4 mg    Or     naloxone (NARCAN) injection 0.2 mg    Or     naloxone (NARCAN) injection 0.4 mg     ondansetron (ZOFRAN) tablet 8 mg     oxyCODONE IR (ROXICODONE) tablet 20 mg     senna-docusate (SENOKOT-S/PERICOLACE) 8.6-50 MG per tablet 1 tablet    Or     senna-docusate (SENOKOT-S/PERICOLACE) 8.6-50 MG per tablet 2 tablet     sodium chloride (PF) 0.9% PF flush 10 mL     sodium chloride (PF) 0.9% PF flush 10 mL     sodium chloride (PF) 0.9% PF flush 3 mL     sodium chloride (PF) 0.9% PF flush 3 mL     sodium chloride (PF) 0.9% PF flush 3 mL     sodium chloride (PF) 0.9% PF flush 3 mL     sodium chloride 0.9% infusion     vancomycin (VANCOCIN) 1000 mg in dextrose 5% 200 mL PREMIX       Immunizations:     Immunization History   Administered Date(s) Administered     DTAP (<7y) 1999, 1999, 1999, 02/06/2001, 08/23/2004     DTaP, Unspecified 04/27/2010     W4x0-73 Novel Flu 10/30/2009     HPV Quadrivalent 10/16/2012, 09/16/2015     HPV9 10/16/2012, 07/02/2014, 09/16/2015     Hep B, Peds or Adolescent 1999, 1999, 1999, 01/18/2000, 07/31/2003, 03/24/2015     HepA-ped 2 Dose 11/27/2001, 04/27/2010, 02/04/2011     Hib (PRP-T) 1999     Hib,  "Unspecified 1999, 1999, 02/06/2001     Historical DTP/aP 02/06/2001     Historical Hepb 1999     Influenza (IIV3) PF 10/11/2006, 10/16/2007, 11/18/2008, 09/24/2009     Influenza Vaccine IM > 6 months Valent IIV4 (Alfuria,Fluzone) 09/13/2018, 09/25/2019     Influenza Vaccine IM Ages 6-35 Months 4 Valent (PF) 12/13/2010, 09/22/2011, 09/06/2012, 10/16/2013     Influenza Vaccine, 6+MO IM (QUADRIVALENT W/PRESERVATIVES) 09/08/2014, 09/16/2015     MMR 03/05/2000, 05/05/2000, 08/23/2004     Meningococcal (Bexsero ) 09/16/2015, 05/13/2019     Meningococcal (Menactra ) 04/27/2010, 09/16/2015, 08/16/2021     Pedvax-hib 02/26/2001     Pneumo Conj 13-V (2010&after) 05/13/2019     Pneumococcal (PCV 7) 02/06/2001, 08/29/2001     Pneumococcal 23 valent 03/30/2004, 10/30/2009, 07/12/2019     Poliovirus, inactivated (IPV) 1999, 1999, 1999, 08/23/2004     Tdap (Adacel,Boostrix) 04/27/2010     Varicella 05/05/2000, 10/11/2006       Review of systems:   The patient denies any fever, chills, nausea, vomiting, diarrhea. See above for other symptoms.     Abbreviated Physical Exam:   BP (!) 151/103   Pulse 116   Temp 99.3  F (37.4  C) (Oral)   Resp 20   Ht 1.626 m (5' 4\")   Wt 77.1 kg (170 lb)   SpO2 97%   BMI 29.18 kg/m      Patient is comfortable and alert enough to make own medical decisions.   Laboratory Data:     ROUTINE ICU LABS (Last four results)  CMP  Recent Labs   Lab 09/07/22  0748 09/06/22  1600 09/06/22  0642 09/05/22 2022 09/05/22  0535   *  --  132* 134* 135*   POTASSIUM 3.5 4.0 3.3* 4.0 4.1   CHLORIDE 105  --  103 103 104   CO2 20*  --  17* 19* 19*   ANIONGAP 9  --  12 12 12   GLC 98  --  111* 130* 136*   BUN 3.9*  --  4.4* 4.8* 6.9   CR 0.52  --  0.61 0.59 0.66   GFRESTIMATED >90  --  >90 >90 >90   MICAH 8.2*  --  8.1* 9.3 9.6   PROTTOTAL 6.3*  --  7.1 8.3 8.1   ALBUMIN 3.5  --  4.1 4.8 4.8   BILITOTAL 4.9*  --  5.2* 4.7* 3.9*   ALKPHOS 86  --  94 103 94   AST 58*  --  78* 91* " 94*   ALT 30  --  37* 55* 66*     CBC  Recent Labs   Lab 09/07/22  0748 09/06/22  1911 09/06/22  0642 09/05/22 2022 09/05/22  0535   WBC 19.0*  --  21.4* 26.7* 20.1*   RBC 2.73*  --  2.12* 2.42* 2.41*   HGB 8.3* 9.6* 6.6* 7.5* 7.7*   HCT 23.9*  --  18.4* 20.5* 21.3*   MCV 88  --  87 85 88   MCH 30.4  --  31.1 31.0 32.0   MCHC 34.7  --  35.9 36.6* 36.2   RDW 20.8*  --  24.0* 25.2* 25.3*     --  287 282 339           Attestation:  This patient has been seen and evaluated by me, Nadeen Bowser.      Nadeen Bowser M.D.  Division of Transfusion Medicine   Department of [place] [address]     Addendum:  Associating the apheresis consult request/orders.    Ifeanyi Post M.D.  Professor, Transfusion Medicine  Laboratory Medicine & Pathology  Pager: [phone]

## 2022-09-07 NOTE — PROCEDURES
Transfusion Medicine/Apheresis Procedure Note   Jennifer NEWMAN Billy [id number]   : 2000 22 year old   Procedure:  Red cell exchange for probable acute chest syndrome (ACS)     Assessment and Plan:   The patient is a 22 year old woman with history of sickle cell disease (SCD), stroke, PEs who presents with shortness of breath and chest pain (probable ACS) and concern for infection vs. PE.  Heme consulted and requested red cell exchange.  Patient had femoral line placed by IR, and blood was ordered.  Red cell exchange just getting underway.    This will be a 7 unit exchange.  PRBCs are ABO, Rh and Bridget typed to avoid potential red cell antibody formation.  Patient types as blood group O, Rh(D) pos, E neg, e pos, C neg, c pos, and Bridget neg.  She is tolerating the procedure early on.  No concerns/complaints.  Continue with plan as per primary team/Heme.    CC:   Shortness of breath, chest pain    PMH:     Past Medical History:   Diagnosis Date     Anxiety      Bleeding disorder (H)      Blood clotting disorder (H)      Cerebral infarction (H)      Cognitive developmental delay     low IQ. Please recognize when managing pain and planning with her     Depressive disorder      Hemiplegia and hemiparesis following cerebral infarction affecting right dominant side (H)     right hand contractures     Iron overload due to repeated red blood cell transfusions      Migraines      Multiple subsegmental pulmonary emboli without acute cor pulmonale (H) 2021     Oppositional defiant behavior      Superficial venous thrombosis of arm, right 2021     Uncomplicated asthma      PSH:     Past Surgical History:   Procedure Laterality Date     AS INSERT TUNNELED CV 2 CATH W/O PORT/PUMP       CHOLECYSTECTOMY       CV RIGHT HEART CATH MEASUREMENTS RECORDED N/A 2021    Procedure: Right Heart Cath;  Surgeon: Jackson Stauffer MD;  Location:  HE[facility] CARD[facility] CATH LAB     INSERT PORT VASCULAR ACCESS Left 2021     Procedure: INSERTION, VASCULAR ACCESS PORT (NOT SURE ON SIDE UNTIL REMOVAL);  Surgeon: Rajan More MD;  Location: [facility] OR     IR CHEST PORT PLACEMENT > 5 YRS OF AGE  4/21/2021     IR CVC NON TUNNEL LINE REMOVAL  5/6/2021     IR CVC NON TUNNEL PLACEMENT > 5 YRS  04/07/2020     IR CVC NON TUNNEL PLACEMENT > 5 YRS  4/30/2021     IR PORT REMOVAL LEFT  4/21/2021     REMOVE PORT VASCULAR ACCESS Left 4/21/2021    Procedure: REMOVAL, VASCULAR ACCESS PORT LEFT;  Surgeon: Rajan More MD;  Location: [facility] OR     REPAIR TENDON ELBOW Right 10/02/2019    Procedure: Right Elbow Flexor Lengthening, Flexor Pronator Slide Of Wrist and Finger, Thumb Adductor Lengthening;  Surgeon: Anai Franco MD;  Location: [facility] OR     TONSILLECTOMY Bilateral 10/02/2019    Procedure: Bilateral Tonsillectomy;  Surgeon: Farhana Guy MD;  Location: [facility] OR     ZZC BREAST REDUCTION (INCLUDES LIPO) TIER 3 Bilateral 04/23/2019     Social History:     Single, non-smoker, no EtOH currently    Allergies:      Allergies   Allergen Reactions     Contrast Dye      Hives and breathing issues     Fish-Derived Products Hives     Seafood Hives     Diagnostic X-Ray Materials      Gadolinium      Medications:     Current Facility-Administered Medications   Medication     acetaminophen (TYLENOL) tablet 975 mg     albuterol (PROVENTIL) neb solution 5 mg     Anticoagulant Citrate Dextrose Formula A   ([facility]heres[facility])     aspirin (ASA) chewable tablet 81 mg     azithromycin (ZITHROMAX) tablet 500 mg     cefTRIAXone (ROCEPHIN) 2 g vial to attach to  ml bag for ADULTS or NS 50 ml bag for PEDS     diphenhydrAMINE (BENADRYL) injection 50 mg     fluticasone-vilanterol (BREO ELLIPTA) 200-25 MCG/INH inhaler 1 puff     heparin 100 UNIT/ML injection 3 mL     heparin 100 UNIT/ML injection 3 mL     heparin infusion 25,000 units in D5W 250 mL ANTICOAGULANT     HYDROmorphone (DILAUDID) injection 1 mg     hydroxyurea (HYDREA) capsule 3,000 mg      ketamine (KETALAR) 2 mg/mL in sodium chloride 0.9 % 50 mL ANALGESIA infusion     lidocaine (LMX4) cream     lidocaine 1 % 1 mL     melatonin tablet 1 mg     naloxone (NARCAN) injection 0.2 mg    Or     naloxone (NARCAN) injection 0.4 mg    Or     naloxone (NARCAN) injection 0.2 mg    Or     naloxone (NARCAN) injection 0.4 mg     ondansetron (ZOFRAN) tablet 8 mg     oxyCODONE IR (ROXICODONE) tablet 20 mg     senna-docusate (SENOKOT-S/PERICOLACE) 8.6-50 MG per tablet 1 tablet    Or     senna-docusate (SENOKOT-S/PERICOLACE) 8.6-50 MG per tablet 2 tablet     sodium chloride (PF) 0.9% PF flush 10 mL     sodium chloride (PF) 0.9% PF flush 10 mL     sodium chloride (PF) 0.9% PF flush 3 mL     sodium chloride (PF) 0.9% PF flush 3 mL     sodium chloride (PF) 0.9% PF flush 3 mL     sodium chloride (PF) 0.9% PF flush 3 mL     sodium chloride 0.9% infusion     vancomycin (VANCOCIN) 1000 mg in dextrose 5% 200 mL PREMIX       Immunizations:     Immunization History   Administered Date(s) Administered     DTAP (<7y) 1999, 1999, 1999, 02/06/2001, 08/23/2004     DTaP, Unspecified 04/27/2010     N2p9-50 Novel Flu 10/30/2009     HPV Quadrivalent 10/16/2012, 09/16/2015     HPV9 10/16/2012, 07/02/2014, 09/16/2015     Hep B, Peds or Adolescent 1999, 1999, 1999, 01/18/2000, 07/31/2003, 03/24/2015     HepA-ped 2 Dose 11/27/2001, 04/27/2010, 02/04/2011     Hib (PRP-T) 1999     Hib, Unspecified 1999, 1999, 02/06/2001     Historical DTP/aP 02/06/2001     Historical Hepb 1999     Influenza (IIV3) PF 10/11/2006, 10/16/2007, 11/18/2008, 09/24/2009     Influenza Vaccine IM > 6 months Valent IIV4 (Alfuria,Fluzone) 09/13/2018, 09/25/2019     Influenza Vaccine IM Ages 6-35 Months 4 Valent (PF) 12/13/2010, 09/22/2011, 09/06/2012, 10/16/2013     Influenza Vaccine, 6+MO IM (QUADRIVALENT W/PRESERVATIVES) 09/08/2014, 09/16/2015     MMR 03/05/2000, 05/05/2000, 08/23/2004     Meningococcal  (Bexsero ) 09/16/2015, 05/13/2019     Meningococcal (Menactra ) 04/27/2010, 09/16/2015, 08/16/2021     Pedvax-hib 02/26/2001     Pneumo Conj 13-V (2010&after) 05/13/2019     Pneumococcal (PCV 7) 02/06/2001, 08/29/2001     Pneumococcal 23 valent 03/30/2004, 10/30/2009, 07/12/2019     Poliovirus, inactivated (IPV) 1999, 1999, 1999, 08/23/2004     Tdap (Adacel,Boostrix) 04/27/2010     Varicella 05/05/2000, 10/11/2006     Abbreviated Physical Exam:     Vitals:    09/07/22 1530 09/07/22 1545 09/07/22 1600 09/07/22 1752   BP: (!) 149/108 (!) 155/112 (!) 150/111 (!) 151/84   BP Location: Right arm      Patient Position: Supine      Cuff Size: Adult Regular      Pulse: 116 118 (!) 121 116   Resp: 28 30 18 20   Temp:       TempSrc:       SpO2: 97% 97% 97%    Weight:       Height:         Patient is resting comfortably.    Laboratory Data:     CMP  Recent Labs   Lab 09/07/22  0748 09/06/22  1600 09/06/22  0642 09/05/22 2022 09/05/22  0535   *  --  132* 134* 135*   POTASSIUM 3.5 4.0 3.3* 4.0 4.1   CHLORIDE 105  --  103 103 104   CO2 20*  --  17* 19* 19*   ANIONGAP 9  --  12 12 12   GLC 98  --  111* 130* 136*   BUN 3.9*  --  4.4* 4.8* 6.9   CR 0.52  --  0.61 0.59 0.66   GFRESTIMATED >90  --  >90 >90 >90   MICAH 8.2*  --  8.1* 9.3 9.6   PROTTOTAL 6.3*  --  7.1 8.3 8.1   ALBUMIN 3.5  --  4.1 4.8 4.8   BILITOTAL 4.9*  --  5.2* 4.7* 3.9*   ALKPHOS 86  --  94 103 94   AST 58*  --  78* 91* 94*   ALT 30  --  37* 55* 66*     CBC    Recent Labs   Lab 09/07/22  0748 09/06/22  1911 09/06/22  0642 09/05/22 2022 09/05/22  0535   WBC 19.0*  --  21.4* 26.7* 20.1*   RBC 2.73*  --  2.12* 2.42* 2.41*   HGB 8.3* 9.6* 6.6* 7.5* 7.7*   HCT 23.9*  --  18.4* 20.5* 21.3*   MCV 88  --  87 85 88   MCH 30.4  --  31.1 31.0 32.0   MCHC 34.7  --  35.9 36.6* 36.2   RDW 20.8*  --  24.0* 25.2* 25.3*     --  287 282 339     Attestation:  During the red cell exchange procedure the patient was directly seen and evaluated by me,  Ifeanyi Post M.D..    Ifeanyi Post M.D.  Professor, Transfusion Medicine  Laboratory Medicine & Pathology  Pager: [phone]      Addendum:  Femoral line was sluggish immediately on use.   After continuing difficulties, assessment, and troubleshooting, including switching lines and modifying patient's position, the femoral line functioned well.  Actual red cell exchange started roughly around 7pm.      Ifeanyi Post M.D.  Professor, Transfusion Medicine  Laboratory Medicine & Pathology  Pager: [phone]

## 2022-09-07 NOTE — PLAN OF CARE
Goal Outcome Evaluation:    Plan of Care Reviewed With: patient     Overall Patient Progress: no change    Outcome Evaluation: Pt in immense pain throughout the night, reporting that it radiates throughout her whole right side, abdomen, back and shoulder. Started Ketamine gtt at 4mg/hr, now at 6mg/hr IVPB to NS at 125mL/hr. Received one dose of PRN Ketamine IV push and oxy q4. Up to bedside commode x1 assist, having dark jesica urine. Hep gtt restarted after critical Xa lab of >1.1; gtt at 10mL/hr. Blood cx came back positive for Gram positive cocci due to MRSE. Requiring 5L Oximask for adequate O2 satts, provider aware. Had one fever and one low grade fever overnight, continue to monitor labs.

## 2022-09-07 NOTE — PROGRESS NOTES
Medicine Cross cover    Called that the patient's blood culture obtained on admission + for GPC in clusters.  She has been febrile.    - added vancomycin IV  - repeat blood culture in am    Started ketamine infusion for pain

## 2022-09-07 NOTE — PROCEDURES
[facility]    Procedure: IR Procedure Note    Date/Time: 9/7/2022 4:13 PM  Performed by: Andrew Gonzalez PA-C  Authorized by: Andrew Gonzalez PA-C   IR Fellow Physician:  Other(s) attending procedure: Assist: Karley Garcia PA-S      UNIVERSAL PROTOCOL   Site Marked: NA  Prior Images Obtained and Reviewed:  Yes  Required items: Required blood products, implants, devices and special equipment available    Patient identity confirmed:  Verbally with patient, arm band, provided demographic data and hospital-assigned identification number  Patient was reevaluated immediately before administering moderate or deep sedation or anesthesia  Confirmation Checklist:  Patient's identity using two indicators, relevant allergies, procedure was appropriate and matched the consent or emergent situation and correct equipment/implants were available  Time out: Immediately prior to the procedure a time out was called    Universal Protocol: the Joint Commission Universal Protocol was followed    Preparation: Patient was prepped and draped in usual sterile fashion    ESBL (mL):  3     ANESTHESIA    Anesthesia: Local infiltration  Local Anesthetic:  Lidocaine 1% without epinephrine  Anesthetic Total (mL):  7      SEDATION    Patient Sedated: No    See dictated procedure note for full details.  Findings: Image guided placement of left femoral vein, 14 Fr., 24 cm. Dual lumen non-tunneled central venous Schon catheter. Catheter is ready for use.    Specimens: none    Complications: None    Condition: Stable    Plan: Follow up per primary team.      PROCEDURE    Patient Tolerance:  Patient tolerated the procedure well with no immediate complications  Length of time physician/provider present for 1:1 monitoring during sedation: 0

## 2022-09-07 NOTE — PROGRESS NOTES
[facility]    Medicine Progress Note - Hospitalist Service, GOLD TEAM 8    Date of Admission:  9/5/2022    Assessment & Plan          22 year old female admitted on 9/5/2022. She has a past medical history of sickle cell disease, CVA, PE, and cognitive delay who presents with shortness of breath and chest pain with Acute chest pain syndrome and concern for infection vs PE        # Acute chest syndrome  # Leukocytosis (neutrophil predominant)  CXR at admission with lower lung volumes but new opacity in both lung bases (atelectasis versus infiltrate possible).  Negative COVID.  Hematology has seen and given leukocytosis, elevated procal, and CXR findings concerning for acute chest.  Procal mildly elevated, negative troponin, and lactate not elevated.  Given Zosyn in the ED, Got 2 units of blood also 09/06. Strep PNA neg, legionella neg and Resp panel neg.     -Consult hematology, appreciate recs. Will need exchange transfusion today due to increased O2 requirement.   -Start ceftriaxone and azithromycin SOT 09/05 Added Vanc due to fever and MRSA nares being positive   - Blood Cx 1/2 MRSE likely contaminant. Will repeat Cx.  - Encourage incentive spirometry  -Continue IVF as below  - Pain control with Oxycodone 20 q4h, Ketamine at 8mg/h and Dilaudid IV 1 q3h ordered       # Concern for PE  # SOB  # Hypoxia  # Chest pain  # History of PE  Patient with chest pain and SOB with concern for sickle cell pain, possible acute chest syndrome (as below), and PE (given elevated D-dimer, history of VTE, and currently not on anticoagulant).  Patient started on heparin drip prophylactically, Unable to get CT scan due to contrast allergy.     Plan:  - Continue heparin drip. Talked to patient about pre medicating prior to CT chest. Pt said last time she had hives, itchy and had SOB. Didn't want to have it today and said will think about it. If she decided not to do it then will go ahead and  "get a VQ scan  - Hematology consult as above  - Monitor CBC w/ diff, retic count, bilirubin in AM              - Do not transfuse blood unless discussed with Hematology first. Do not transfuse based solely off hemoglobin level given risk of iron overload and risk of developing RBC antibodies.   - Monitor O2 sats and vitals closely  - Telemetry  - Hydrea and deferasirox per hematology  - IVF: Continue NS at 125cc/hr  - Regular diet     # Sickle cell anemia  # History of CVA with RUE hemiparesis  # Sickle Cell Pain Crisis  # Back pain  Presented earlier in the day with sickle cell pain crisis (back pain) and was discharged after treatment with Toradol, ketamine, LR, and oxycodone. Subsequently developed chest pain and SOB prior to returning to ED. Of note she has prior treatment plan in chart.  Discussed continuing home regimen for the time being and patient in agreement.  - Pain tx as above   -Zofran as needed  -Continue ASA  -Senna PRN, pending stool output may need to schedule     # Asthma  Continue PTA Symbicort (Breo Ellipta sub per pharm) and albuterol      # Overweight: Estimated body mass index is 29.18 kg/m  as calculated from the following:    Height as of this encounter: 1.626 m (5' 4\").    Weight as of this encounter: 77.1 kg (170 lb).       Diet: Regular Diet Adult    DVT Prophylaxis: Heparin ggt  Lopez Catheter: Not present  Central Lines: PRESENT     Cardiac Monitoring: None  Code Status: Full Code      Disposition Plan     Unknown  The patient's care was discussed with the Bedside Nurse, Patient and Hematology Consultant.    ARELY CASTAÑEDA MD  Hospitalist Service, GOLD TEAM [address] [place] [address]  Securely message with the Vocera Web Console (learn more here)  Text page via [employer] Paging/Directory   Please see signed in provider for up to date coverage information      Clinically Significant Risk Factors Present on Admission               "     ______________________________________________________________________    Interval History   Pt is having worse SOB. Increased O2 demand. Feeling a lot of pain and would like to add another medication for pain on top if Ketamine and Oxycodone     Data reviewed today: I reviewed all medications, new labs and imaging results over the last 24 hours.     Physical Exam   Vital Signs: Temp: 99.3  F (37.4  C) Temp src: Oral BP: (!) 145/85 Pulse: 115   Resp: 28 SpO2: 97 % O2 Device: Oxymask Oxygen Delivery: 5 LPM  Weight: 170 lbs 0 oz  Constitutional: Mild distress lying in bed  Respiratory: Tachypnic, No wheezez or crackles     Data

## 2022-09-07 NOTE — IR NOTE
Patient Name: Jennifer Cervantes  Medical Record Number: [id number]  Today's Date: 9/7/2022    Procedure: Image guided non-tunneled central venous catheter for apheresis  Proceduralist: Robe HIGGINS    Procedure Start: 1536  Procedure end: 1610  Sedation medications administered: local lidocaine 1%.       Report given to: ESTHER RN      Other Notes: Pt arrived to IR room 5 from . Consent reviewed. Pt denies any questions or concerns regarding procedure. Pt positioned supine and monitored per protocol. Pt tolerated procedure without any noted complications. Pt transferred back to .

## 2022-09-07 NOTE — PROGRESS NOTES
Hematology  Daily Progress Note   Date of Service: 09/07/2022    Patient: Jennifer Cervantes  MRN: [id number]  Admission Date: 9/5/2022  Hospital Day # Hospital Day: 3  Outpatient hospitalist: Dr. 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 Admitted to medicine service 9/5/2022 for acute sickle cell pain crisis with possible acute chest syndrome.    Recommendations:   New today  -Consult transfusion medicine for pRBC exchange for acute chest syndrome (order placed)  -VQ scan   -Ok to add PRN IV dilaudid if pain uncontrolled (would still like to avoid PCA)    General Sickle Cell Management   - Monitor CBC w/ diff, retic count, bilirubin daily x 2-3 days then may space out   -please discuss transfusions with hematology first   - Continue 3000 mg hydroxyurea  - Continue folic acid    Acute chest syndrome  Hypoxia  Possible b/l lung infiltrates  Concern for PE  Fever   -O2 status unchanged s/p 2 units pRBC simple transfusion- will consult IR for line placement and transfusion medicine for RBC exchange  -Agree with antibiotics per primary team  -monitor o2 status and wean as able  -VQ scan to assess for PE, will further discuss possible premed for CTA (has contrast allergy)  -For now continue heparin drip (empiric therapeutic ac) and aspirin BID but will need to monitor closely for worsening bleeding    -Will consider pulm consult in future if no etiology of symptoms found or does not improve post exchange (to discuss pulmonary htn, asthma, etc)     Sickle Cell Pain  Episode Control   - Continue aggressive pain control per primary team and care plan   -ok to add IV dilaudid but would still like to avoid PCA   - Consider scheduled APAP, NSAIDs (ketorolac or ibuprofen), gabapentin,   - Consider topicals (lidocaine patch, diclofenac gel, BenGay, warm packs) if patient finds these helpful    Follow up  - Will continue to follow while inpatient.  - Next outpatient follow up to be arranged.    Patient was seen and plan of care was discussed with attending physician Dr. Cogan    We will continue to follow this patient. Please don't hesitate to contact the Fellow On-Call with questions.    I spent 90 minutes face-to-face or coordinating care of Jennifer Cervantes.  Over 50% of our time on the unit was spent counseling the patient and/or coordinating care regarding sickle cell pain crisis, acute chest syndrome     Cherelle Brock PA-C  [facility]  [phone]    ___________________________________________________________________  Subjective & Interval History:    No acute events noted overnight. Remains on 5 LPM via facemask, unchanged since receiving 2 units pRBC simple transfusion. She remains tachypneic and tachycardic.  She reports her breathing remains unchanged, pain is still present, mostly under her ribs and she thinks this is also making it hard to take a deep breath.      Had + blood culture but likely contaminant per lab, repeat pending.  Vanc added due to MRSA + nares.     When seen later in the day nurse also reports she has had minor bleeding from the mouth. No other bleeding  She has had labile antiXa levels on heparin   Latest Reference Range & Units 09/06/22 16:00 09/07/22 01:10 09/07/22 08:35 09/07/22 13:50   Heparin Unfractionated Anti Xa Level For Reference Range, See Comment IU/mL 0.75 >1.10 (HH) 0.18 >1.10 (HH)   (HH): Data is critically high        Physical Exam:    BP (!) 145/85 (BP Location: Right arm)   Pulse 115   Temp 99.3  F (37.4  C) (Oral)   Resp 28   Ht  "1.626 m (5' 4\")   Wt 77.1 kg (170 lb)   SpO2 97%   BMI 29.18 kg/m    Gen: Appears uncomfortable but NAD   HEENT: NC/AT   CV: Tachycardic, rhythm regular   Pulm: CTAB, no wheezing, + tachypneic   Abd: Soft, nt/nd, no rebound/guarding  Ext: Warm and well perfused. No lower extremity edema.   Skin: No rash, cyanosis or petechial lesion  Neuro: Alert and answering questions appropriately.     Labs & Studies: I personally reviewed the following studies:  ROUTINE LABS (Last four results):  CMPRecent Labs   Lab 09/07/22  0748 09/06/22  1600 09/06/22  0642 09/05/22 2022 09/05/22  0535   *  --  132* 134* 135*   POTASSIUM 3.5 4.0 3.3* 4.0 4.1   CHLORIDE 105  --  103 103 104   CO2 20*  --  17* 19* 19*   ANIONGAP 9  --  12 12 12   GLC 98  --  111* 130* 136*   BUN 3.9*  --  4.4* 4.8* 6.9   CR 0.52  --  0.61 0.59 0.66   GFRESTIMATED >90  --  >90 >90 >90   MICAH 8.2*  --  8.1* 9.3 9.6   PROTTOTAL 6.3*  --  7.1 8.3 8.1   ALBUMIN 3.5  --  4.1 4.8 4.8   BILITOTAL 4.9*  --  5.2* 4.7* 3.9*   ALKPHOS 86  --  94 103 94   AST 58*  --  78* 91* 94*   ALT 30  --  37* 55* 66*     CBC  Recent Labs   Lab 09/07/22  0748 09/06/22  1911 09/06/22  0642 09/05/22 2022 09/05/22  0535   WBC 19.0*  --  21.4* 26.7* 20.1*   RBC 2.73*  --  2.12* 2.42* 2.41*   HGB 8.3* 9.6* 6.6* 7.5* 7.7*   HCT 23.9*  --  18.4* 20.5* 21.3*   MCV 88  --  87 85 88   MCH 30.4  --  31.1 31.0 32.0   MCHC 34.7  --  35.9 36.6* 36.2   RDW 20.8*  --  24.0* 25.2* 25.3*     --  287 282 339     INRNo lab results found in last 7 days.    Medications list for reference:  Current Facility-Administered Medications   Medication     acetaminophen (TYLENOL) tablet 975 mg     albuterol (PROVENTIL) neb solution 5 mg     aspirin (ASA) chewable tablet 81 mg     azithromycin (ZITHROMAX) tablet 500 mg     cefTRIAXone (ROCEPHIN) 2 g vial to attach to  ml bag for ADULTS or NS 50 ml bag for PEDS     fluticasone-vilanterol (BREO ELLIPTA) 200-25 MCG/INH inhaler 1 puff     heparin " infusion 25,000 units in D5W 250 mL ANTICOAGULANT     hydroxyurea (HYDREA) capsule 3,000 mg     ketamine (KETALAR) 2 mg/mL in sodium chloride 0.9 % 50 mL ANALGESIA infusion     lidocaine (LMX4) cream     lidocaine 1 % 1 mL     melatonin tablet 1 mg     naloxone (NARCAN) injection 0.2 mg    Or     naloxone (NARCAN) injection 0.4 mg    Or     naloxone (NARCAN) injection 0.2 mg    Or     naloxone (NARCAN) injection 0.4 mg     ondansetron (ZOFRAN) tablet 8 mg     oxyCODONE IR (ROXICODONE) tablet 20 mg     senna-docusate (SENOKOT-S/PERICOLACE) 8.6-50 MG per tablet 1 tablet    Or     senna-docusate (SENOKOT-S/PERICOLACE) 8.6-50 MG per tablet 2 tablet     sodium chloride (PF) 0.9% PF flush 3 mL     sodium chloride (PF) 0.9% PF flush 3 mL     sodium chloride (PF) 0.9% PF flush 3 mL     sodium chloride (PF) 0.9% PF flush 3 mL     sodium chloride 0.9% infusion     vancomycin (VANCOCIN) 1000 mg in dextrose 5% 200 mL PREMIX

## 2022-09-07 NOTE — PROGRESS NOTES
RR 36. . Pt stated she feels like she needs a blood transfusion. Bloody gums. Hep gtt @1000U/hr. Awaiting redraw. MD notified. Called resource RN. Increased ketamine gtt. Plan for possible blood exchange.

## 2022-09-07 NOTE — PROVIDER NOTIFICATION
0250: received call from lab regarding BC drawn 9/5/22, growing gram+cocci in clusters.  Gold [phone]) paged with update.

## 2022-09-07 NOTE — PLAN OF CARE
Goal Outcome Evaluation:    Plan of Care Reviewed With: patient          Outcome Evaluation: Pt AOx4, VSS on 5L, on tele, on heparin drip, q4hrs vitals, triggered sepsis but lactic acid levels wnl of 0.6, c/o pain to back, one time ketamine given with effective, prn oxycodone x2 given with minimal effectiveness per patient, on regular diet

## 2022-09-07 NOTE — PLAN OF CARE
Goal Outcome Evaluation:    Plan of Care Reviewed With: patient     Overall Patient Progress: improving       Admitted for hypoxia. Intermittently lethargic. Pt c/o abd/chest pain. Ketamine gtt increased to 4mL/hr from 3mL/hr. Administered prn IV dilaudid & oxycodone. 5L NC O2. Tachycardic 110's. RR 20-30s. Hep gtt @ 850U/hr. Hep Xa level labile. Heparin gtt changed according to order sets. Some bleeding noted in gums, MD notified. Pt requiring exchange transfusion d/t ACS and increased O2 needs. IR placed central line in R groin for blood exchange. Apheresis RN here now to do exchange transfusion. NS @ 125mL/hr. Administered IV vanco & ceftriaxone. MRSA positive, now in private room.      Plan: VQ scan vs. CT for PE.

## 2022-09-08 LAB
ALBUMIN SERPL BCG-MCNC: 3.1 G/DL (ref 3.5–5.2)
ALP SERPL-CCNC: 78 U/L (ref 35–104)
ALT SERPL W P-5'-P-CCNC: 20 U/L (ref 10–35)
ANION GAP SERPL CALCULATED.3IONS-SCNC: 11 MMOL/L (ref 7–15)
AST SERPL W P-5'-P-CCNC: 42 U/L (ref 10–35)
BILIRUB SERPL-MCNC: 3.4 MG/DL
BUN SERPL-MCNC: 2.7 MG/DL (ref 6–20)
CALCIUM SERPL-MCNC: 8 MG/DL (ref 8.6–10)
CHLORIDE SERPL-SCNC: 104 MMOL/L (ref 98–107)
CREAT SERPL-MCNC: 0.48 MG/DL (ref 0.51–0.95)
DEPRECATED HCO3 PLAS-SCNC: 20 MMOL/L (ref 22–29)
ERYTHROCYTE [DISTWIDTH] IN BLOOD BY AUTOMATED COUNT: 16.7 % (ref 10–15)
GFR SERPL CREATININE-BSD FRML MDRD: >90 ML/MIN/1.73M2
GLUCOSE SERPL-MCNC: 113 MG/DL (ref 70–99)
HCT VFR BLD AUTO: 22.5 % (ref 35–47)
HGB BLD-MCNC: 7.9 G/DL (ref 11.7–15.7)
LACTATE SERPL-SCNC: 0.5 MMOL/L (ref 0.7–2)
LACTATE SERPL-SCNC: 1.1 MMOL/L (ref 0.7–2)
MCH RBC QN AUTO: 30.6 PG (ref 26.5–33)
MCHC RBC AUTO-ENTMCNC: 35.1 G/DL (ref 31.5–36.5)
MCV RBC AUTO: 87 FL (ref 78–100)
PLATELET # BLD AUTO: 182 10E3/UL (ref 150–450)
POTASSIUM SERPL-SCNC: 3.2 MMOL/L (ref 3.4–5.3)
POTASSIUM SERPL-SCNC: 3.6 MMOL/L (ref 3.4–5.3)
PROT SERPL-MCNC: 5.8 G/DL (ref 6.4–8.3)
RBC # BLD AUTO: 2.58 10E6/UL (ref 3.8–5.2)
SODIUM SERPL-SCNC: 135 MMOL/L (ref 136–145)
UFH PPP CHRO-ACNC: 0.44 IU/ML
UFH PPP CHRO-ACNC: 0.98 IU/ML
UFH PPP CHRO-ACNC: <0.1 IU/ML
VANCOMYCIN SERPL-MCNC: 8.7 UG/ML
WBC # BLD AUTO: 13.3 10E3/UL (ref 4–11)

## 2022-09-08 PROCEDURE — 258N000003 HC RX IP 258 OP 636: Performed by: STUDENT IN AN ORGANIZED HEALTH CARE EDUCATION/TRAINING PROGRAM

## 2022-09-08 PROCEDURE — 258N000003 HC RX IP 258 OP 636: Performed by: EMERGENCY MEDICINE

## 2022-09-08 PROCEDURE — 36415 COLL VENOUS BLD VENIPUNCTURE: CPT | Performed by: STUDENT IN AN ORGANIZED HEALTH CARE EDUCATION/TRAINING PROGRAM

## 2022-09-08 PROCEDURE — 250N000013 HC RX MED GY IP 250 OP 250 PS 637: Performed by: STUDENT IN AN ORGANIZED HEALTH CARE EDUCATION/TRAINING PROGRAM

## 2022-09-08 PROCEDURE — 250N000011 HC RX IP 250 OP 636: Performed by: PHYSICIAN ASSISTANT

## 2022-09-08 PROCEDURE — 84132 ASSAY OF SERUM POTASSIUM: CPT | Performed by: STUDENT IN AN ORGANIZED HEALTH CARE EDUCATION/TRAINING PROGRAM

## 2022-09-08 PROCEDURE — 99233 SBSQ HOSP IP/OBS HIGH 50: CPT | Performed by: STUDENT IN AN ORGANIZED HEALTH CARE EDUCATION/TRAINING PROGRAM

## 2022-09-08 PROCEDURE — 80053 COMPREHEN METABOLIC PANEL: CPT | Performed by: STUDENT IN AN ORGANIZED HEALTH CARE EDUCATION/TRAINING PROGRAM

## 2022-09-08 PROCEDURE — 120N000002 HC R&B MED SURG/OB UMMC

## 2022-09-08 PROCEDURE — 250N000009 HC RX 250: Performed by: STUDENT IN AN ORGANIZED HEALTH CARE EDUCATION/TRAINING PROGRAM

## 2022-09-08 PROCEDURE — 250N000012 HC RX MED GY IP 250 OP 636 PS 637: Performed by: STUDENT IN AN ORGANIZED HEALTH CARE EDUCATION/TRAINING PROGRAM

## 2022-09-08 PROCEDURE — 250N000011 HC RX IP 250 OP 636: Performed by: STUDENT IN AN ORGANIZED HEALTH CARE EDUCATION/TRAINING PROGRAM

## 2022-09-08 PROCEDURE — 82040 ASSAY OF SERUM ALBUMIN: CPT | Performed by: STUDENT IN AN ORGANIZED HEALTH CARE EDUCATION/TRAINING PROGRAM

## 2022-09-08 PROCEDURE — 83605 ASSAY OF LACTIC ACID: CPT | Performed by: STUDENT IN AN ORGANIZED HEALTH CARE EDUCATION/TRAINING PROGRAM

## 2022-09-08 PROCEDURE — 36592 COLLECT BLOOD FROM PICC: CPT | Performed by: STUDENT IN AN ORGANIZED HEALTH CARE EDUCATION/TRAINING PROGRAM

## 2022-09-08 PROCEDURE — 85027 COMPLETE CBC AUTOMATED: CPT | Performed by: STUDENT IN AN ORGANIZED HEALTH CARE EDUCATION/TRAINING PROGRAM

## 2022-09-08 PROCEDURE — 85520 HEPARIN ASSAY: CPT | Performed by: STUDENT IN AN ORGANIZED HEALTH CARE EDUCATION/TRAINING PROGRAM

## 2022-09-08 PROCEDURE — 80202 ASSAY OF VANCOMYCIN: CPT | Performed by: STUDENT IN AN ORGANIZED HEALTH CARE EDUCATION/TRAINING PROGRAM

## 2022-09-08 RX ORDER — DIPHENHYDRAMINE HCL 25 MG
50 CAPSULE ORAL ONCE
Status: COMPLETED | OUTPATIENT
Start: 2022-09-08 | End: 2022-09-09

## 2022-09-08 RX ORDER — LIDOCAINE 40 MG/G
CREAM TOPICAL
Status: CANCELLED | OUTPATIENT
Start: 2022-09-08

## 2022-09-08 RX ORDER — HYDROMORPHONE HYDROCHLORIDE 1 MG/ML
0.5 INJECTION, SOLUTION INTRAMUSCULAR; INTRAVENOUS; SUBCUTANEOUS
Status: DISCONTINUED | OUTPATIENT
Start: 2022-09-08 | End: 2022-09-13 | Stop reason: HOSPADM

## 2022-09-08 RX ORDER — DIPHENHYDRAMINE HCL 25 MG
50 CAPSULE ORAL ONCE
Status: CANCELLED | OUTPATIENT
Start: 2022-09-08 | End: 2022-09-08

## 2022-09-08 RX ORDER — LIDOCAINE 40 MG/G
CREAM TOPICAL
Status: DISCONTINUED | OUTPATIENT
Start: 2022-09-08 | End: 2022-09-13 | Stop reason: HOSPADM

## 2022-09-08 RX ORDER — POTASSIUM CHLORIDE 750 MG/1
40 TABLET, EXTENDED RELEASE ORAL ONCE
Status: COMPLETED | OUTPATIENT
Start: 2022-09-08 | End: 2022-09-08

## 2022-09-08 RX ORDER — METHYLPREDNISOLONE 32 MG/1
32 TABLET ORAL ONCE
Status: COMPLETED | OUTPATIENT
Start: 2022-09-08 | End: 2022-09-08

## 2022-09-08 RX ADMIN — ASPIRIN 81 MG CHEWABLE TABLET 81 MG: 81 TABLET CHEWABLE at 20:03

## 2022-09-08 RX ADMIN — AZITHROMYCIN MONOHYDRATE 500 MG: 250 TABLET ORAL at 05:30

## 2022-09-08 RX ADMIN — HYDROMORPHONE HYDROCHLORIDE 1 MG: 1 INJECTION, SOLUTION INTRAMUSCULAR; INTRAVENOUS; SUBCUTANEOUS at 05:30

## 2022-09-08 RX ADMIN — OXYCODONE HYDROCHLORIDE 20 MG: 10 TABLET ORAL at 14:17

## 2022-09-08 RX ADMIN — ASPIRIN 81 MG CHEWABLE TABLET 81 MG: 81 TABLET CHEWABLE at 09:23

## 2022-09-08 RX ADMIN — VANCOMYCIN HYDROCHLORIDE 1000 MG: 1 INJECTION, SOLUTION INTRAVENOUS at 12:02

## 2022-09-08 RX ADMIN — OXYCODONE HYDROCHLORIDE 20 MG: 10 TABLET ORAL at 22:07

## 2022-09-08 RX ADMIN — KETAMINE HYDROCHLORIDE 8 MG/HR: 100 INJECTION, SOLUTION, CONCENTRATE INTRAMUSCULAR; INTRAVENOUS at 17:26

## 2022-09-08 RX ADMIN — KETAMINE HYDROCHLORIDE 8 MG/HR: 100 INJECTION, SOLUTION, CONCENTRATE INTRAMUSCULAR; INTRAVENOUS at 04:46

## 2022-09-08 RX ADMIN — METHYLPREDNISOLONE 32 MG: 32 TABLET ORAL at 13:59

## 2022-09-08 RX ADMIN — VANCOMYCIN HYDROCHLORIDE 1000 MG: 1 INJECTION, SOLUTION INTRAVENOUS at 02:38

## 2022-09-08 RX ADMIN — SODIUM CHLORIDE: 9 INJECTION, SOLUTION INTRAVENOUS at 13:24

## 2022-09-08 RX ADMIN — FLUTICASONE FUROATE AND VILANTEROL TRIFENATATE 1 PUFF: 200; 25 POWDER RESPIRATORY (INHALATION) at 09:18

## 2022-09-08 RX ADMIN — HYDROMORPHONE HYDROCHLORIDE 1 MG: 1 INJECTION, SOLUTION INTRAMUSCULAR; INTRAVENOUS; SUBCUTANEOUS at 09:18

## 2022-09-08 RX ADMIN — METHYLPREDNISOLONE 32 MG: 32 TABLET ORAL at 22:07

## 2022-09-08 RX ADMIN — CEFTRIAXONE SODIUM 2 G: 2 INJECTION, POWDER, FOR SOLUTION INTRAMUSCULAR; INTRAVENOUS at 16:24

## 2022-09-08 RX ADMIN — HYDROMORPHONE HYDROCHLORIDE 1 MG: 1 INJECTION, SOLUTION INTRAMUSCULAR; INTRAVENOUS; SUBCUTANEOUS at 02:38

## 2022-09-08 RX ADMIN — VANCOMYCIN HYDROCHLORIDE 1250 MG: 10 INJECTION, POWDER, LYOPHILIZED, FOR SOLUTION INTRAVENOUS at 18:02

## 2022-09-08 RX ADMIN — HYDROMORPHONE HYDROCHLORIDE 1 MG: 1 INJECTION, SOLUTION INTRAMUSCULAR; INTRAVENOUS; SUBCUTANEOUS at 15:09

## 2022-09-08 RX ADMIN — POTASSIUM CHLORIDE 40 MEQ: 750 TABLET, EXTENDED RELEASE ORAL at 15:09

## 2022-09-08 RX ADMIN — HYDROXYUREA 3000 MG: 500 CAPSULE ORAL at 09:23

## 2022-09-08 RX ADMIN — HYDROMORPHONE HYDROCHLORIDE 0.5 MG: 1 INJECTION, SOLUTION INTRAMUSCULAR; INTRAVENOUS; SUBCUTANEOUS at 20:03

## 2022-09-08 RX ADMIN — SODIUM CHLORIDE: 9 INJECTION, SOLUTION INTRAVENOUS at 05:12

## 2022-09-08 ASSESSMENT — ACTIVITIES OF DAILY LIVING (ADL)
ADLS_ACUITY_SCORE: 23
DEPENDENT_IADLS:: INDEPENDENT
ADLS_ACUITY_SCORE: 23

## 2022-09-08 NOTE — PLAN OF CARE
Goal Outcome Evaluation:    Plan of Care Reviewed With: patient     Overall Patient Progress: no change     Admitted for hypoxia. Intermittently lethargic. Pt c/o abd/chest pain. Ketamine gtt 4mL/hr. Administered prn IV dilaudid & oxycodone. Pt on 3L O2 this AM but now requiring 6L oxymask O2. Tachycardic 110's. RR 20-30s. Hep gtt @ 650U/hr. Awaiting Hep Xa level. Heparin gtt changed according to order sets. NS @ 125mL/hr discontinued d/t increased edema. Administered IV vanco & ceftriaxone. Decreased IV dilaudid dose d/t increased lethargy with doses & increased O2 needs. Intermittently incontinent. LBM 9/6/22.

## 2022-09-08 NOTE — PROGRESS NOTES
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 Admitted to medicine service 9/5/2022 for acute sickle cell pain crisis with acute chest syndrome.    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 Presented initially with diffuse back pain (typical for her pain crisis), later developed chest pain and hypoxia.     There is concern for PE given prior history of recurrent VTEs (in the setting of subtherapeutic INR), elevated D-dimer at 10.44 (although non-specific) and patient is not on anticoagulant PTA (on ASA BID). Also has leukocytosis, elevated procalcitonin with possible new infiltration on both lower lungs concern for CAP. This clinical picture of chest pain, hypoxia, and CXR infiltration can be acute chest syndrome as well and it is difficult to distinguish between these diagnosis with her presentation.     Patient is receiving therapeutic anticoagulant for possible PE and empiric coverage for CAP. Received 2 units of pRBC 9/6 and exchange transfusion 9/7. Now clinically stable but still requiring O2 support.     Recommendations:  - No indication for further exchange transfusion  - Will follow HbS level post exchange transfusion  - Please follow care plan for a  "guidance for pain management as well as maintenance IVF and other supportive care. Okay for IV opioids as needed as last line after other multimodal pain management (including ketamine).  - Continue Hydrea 3000 mg daily  - Plan to continue Jadenu 1440 mg daily, however, patient cannot bring supply from home and this cannot be provide from hospital supply  - Agree with empiric therapeutic anticoagulant for PE (currently on high-intensity heparin). Agree with plan for CTA to confirm the diagnosis and to determine final anticoagulant plan  - Agree with empiric coverage for CAP (currently on ceftriaxone and azithromycin). Further work-up for AHRF per primary team.  - Monitor CBC, CMP, reticulocyte daily     We will continue to follow this patient while hospitalized. Please do not hesitate to page with any questions or concerns.     Patient was seen and plan of care was discussed with attending physician Dr. Cogan.    Ramesh Aguila MD  Hematology/Medical Oncology (PGY-4)  P: [phone]  ------------------------------------------------------------------------------------------------------------------------------------    Interval History:  Notes reviewed, no acute event overnight. Exchange transfusion done without adverse reaction.    Today, patient reports feeling okay but is tired. States that her breathing is okay and denies cough or increased sputum production. Back pain is okay. Denies fever or chills. Denies abdominal pain.    Physical Exam:    Blood pressure 117/66, pulse 117, temperature 98.5  F (36.9  C), temperature source Oral, resp. rate 26, height 1.626 m (5' 4\"), weight 77.1 kg (170 lb), SpO2 98 %, not currently breastfeeding.  General: Alert and cooperative, lying in bed, discomfort in pain but not in acute distress  HEENT: Sclera anicteric, EOMI, MMM  CV: RRR, no murmurs  Resp: CTAB, normal respiratory effort on ambient air  GI: Soft, non-tender, non-distended  MSK: Warm and well-perfused, no " edema  Skin: No rashes or petechiae  Neuro: Drowsy but is oriented to situation. R hemiparesis.    Labs & Studies: I personally reviewed the following studies:  ROUTINE LABS (Last four results):  CMP    CBC  Recent Labs   Lab 09/07/22 2051 09/07/22 1943 09/07/22 0748 09/06/22 1911 09/06/22  0642 09/05/22 2022 09/05/22  0535   WBC  --   --  19.0*  --  21.4* 26.7* 20.1*   RBC  --   --  2.73*  --  2.12* 2.42* 2.41*   HGB 8.6* 8.1* 8.3* 9.6* 6.6* 7.5* 7.7*   HCT 25.7* 23.8* 23.9*  --  18.4* 20.5* 21.3*   MCV  --   --  88  --  87 85 88   MCH  --   --  30.4  --  31.1 31.0 32.0   MCHC  --   --  34.7  --  35.9 36.6* 36.2   RDW  --   --  20.8*  --  24.0* 25.2* 25.3*   PLT  --   --  310  --  287 282 339     Labs and images are reviewed and discussed as pertinent in assessment and plan.

## 2022-09-08 NOTE — PLAN OF CARE
Goal Outcome Evaluation:    Plan of Care Reviewed With: patient     Overall Patient Progress: improving    Outcome Evaluation: Pt less drowsy overnight, holding conversations and remains alert. Recieved aphoresis, no complications. Hepp gtt running at 650mg/hr, recheck Xa level at 745a. Triggered sepsis with elevated HR/WBC LA came back neg, 0.5. Having urinary urgency, wearing brief in bed and was incontinent twice. Pain is managed with IV dilaudid and Ketamine gtt at 8mg/hr. Wearing oxymask at 5L O2. Gave PRN dilaudid for pain, good relief. Femoral line for aphoresis is CDI. Call light within reach, making needs known.

## 2022-09-08 NOTE — PHARMACY-VANCOMYCIN DOSING SERVICE
Pharmacy Vancomycin Note  Date of Service 2022  Patient's  2000   22 year old, female    Indication: Sepsis  Day of Therapy: 2  Current vancomycin regimen:  1000 mg IV q8h  Current vancomycin monitoring method: AUC  Current vancomycin therapeutic monitoring goal: 400-600 mg*h/L    InsightRX Prediction of Current Vancomycin Regimen    Regimen: 1000 mg IV every 8 hours.  Start time: 14:49 on 2022  Exposure target: AUC24 (range)400-600 mg/L.hr   AUC24,ss: 412 mg/L.hr  Probability of AUC24 > 400: 56 %  Ctrough,ss: 10.7 mg/L  Probability of Ctrough,ss > 20: 1 %  Probability of nephrotoxicity (Lodise ANA LAURA ): 6 %    Current estimated CrCl = Estimated Creatinine Clearance: 183.3 mL/min (A) (based on SCr of 0.48 mg/dL (L)).    Creatinine for last 3 days  2022:  8:22 PM Creatinine 0.59 mg/dL  2022:  6:42 AM Creatinine 0.61 mg/dL  2022:  7:48 AM Creatinine 0.52 mg/dL  2022: 10:31 AM Creatinine 0.48 mg/dL    Recent Vancomycin Levels (past 3 days)  2022: 10:31 AM Vancomycin 8.7 ug/mL    Vancomycin IV Administrations (past 72 hours)                   vancomycin (VANCOCIN) 1000 mg in dextrose 5% 200 mL PREMIX (mg) 1,000 mg New Bag 22 1202     1,000 mg New Bag  0238     1,000 mg New Bag 22 1858     1,000 mg New Bag  1210    vancomycin (VANCOCIN) 1,750 mg in sodium chloride 0.9 % 500 mL intermittent infusion (mg) 1,750 mg New Bag 22 0541                Nephrotoxins and other renal medications (From now, onward)    Start     Dose/Rate Route Frequency Ordered Stop    22 1130  vancomycin (VANCOCIN) 1000 mg in dextrose 5% 200 mL PREMIX         1,000 mg  200 mL/hr over 1 Hours Intravenous EVERY 8 HOURS 22 0320               Contrast Orders - past 72 hours (72h ago, onward)    None          Interpretation of levels and current regimen:  Vancomycin level is reflective of -600    Has serum creatinine changed greater than 50% in last 72 hours: No    Urine  output:  unable to determine    Renal Function: Stable    InsightRX Prediction of Planned New Vancomycin Regimen    Regimen: 1250 mg IV every 8 hours.  Start time: 14:49 on 09/08/2022  Exposure target: AUC24 (range)400-600 mg/L.hr   AUC24,ss: 514 mg/L.hr  Probability of AUC24 > 400: 91 %  Ctrough,ss: 13.7 mg/L  Probability of Ctrough,ss > 20: 7 %  Probability of nephrotoxicity (Lodise ANA LAURA 2009): 9 %    Plan:  1. Increase Dose to vancomycin 1250 mg IV q8h  2. Vancomycin monitoring method: AUC  3. Vancomycin therapeutic monitoring goal: 400-600 mg*h/L  4. Pharmacy will check vancomycin levels as appropriate in 1-3 Days.  5. Serum creatinine levels will be ordered daily for the first week of therapy and at least twice weekly for subsequent weeks.    Pia Villa, PharmD

## 2022-09-08 NOTE — PROGRESS NOTES
[facility]    Medicine Progress Note - Hospitalist Service, GOLD TEAM 8    Date of Admission:  9/5/2022    Assessment & Plan            22 year old female admitted on 9/5/2022. She has a past medical history of sickle cell disease, CVA, PE, and cognitive delay who presents with shortness of breath and chest pain with Acute chest pain syndrome and concern for infection vs PE        # Acute chest syndrome  # Leukocytosis (neutrophil predominant)  CXR at admission with lower lung volumes but new opacity in both lung bases (atelectasis versus infiltrate possible).  Negative COVID.  Hematology has seen and given leukocytosis, elevated procal, and CXR findings concerning for acute chest.  Procal mildly elevated, negative troponin, and lactate not elevated.  Given Zosyn in the ED, Got 2 units of blood also 09/06. Strep PNA neg, legionella neg and Resp panel neg.     -Consult hematology, appreciate recs. S/p 2 units of simple transfusion and Exchange transfusion 09/07  -Start ceftriaxone and azithromycin SOT 09/05 Added Vanc due to fever and MRSA nares being positive   - Blood Cx 1/2 MRSE likely contaminant. Will repeat Cx.  - Encourage incentive spirometry  -Continue IVF as below  - Pain control with Oxycodone 20 q4h, Ketamine at 8mg/h and Dilaudid IV 1 q3h ordered       # Concern for PE  # SOB  # Acute hypoxic respiratory failure  # Chest pain  # History of PE  Patient with chest pain and SOB with concern for sickle cell pain, possible acute chest syndrome (as below), and PE (given elevated D-dimer, history of VTE, and currently not on anticoagulant).  Patient started on heparin drip prophylactically, Unable to get CT scan due to contrast allergy.     Plan:  - Continue heparin drip. Ok with CT with contrast with pre medication. To check for PE or infection  - Hematology consult as above  - Monitor CBC w/ diff, retic count, bilirubin in AM              - Do not transfuse blood  "unless discussed with Hematology first. Do not transfuse based solely off hemoglobin level given risk of iron overload and risk of developing RBC antibodies.   - Monitor O2 sats and vitals closely  - Telemetry  - Hydrea and deferasirox per hematology  - IVF: Continue NS at 125cc/hr  - Regular diet     # Sickle cell anemia  # History of CVA with RUE hemiparesis  # Sickle Cell Pain Crisis  # Back pain  Presented earlier in the day with sickle cell pain crisis (back pain) and was discharged after treatment with Toradol, ketamine, LR, and oxycodone. Subsequently developed chest pain and SOB prior to returning to ED. Of note she has prior treatment plan in chart.  Discussed continuing home regimen for the time being and patient in agreement.  - Pain tx as above   -Zofran as needed  -Continue ASA  -Senna PRN, pending stool output may need to schedule     # Asthma  Continue PTA Symbicort (Breo Ellipta sub per pharm) and albuterol      # Overweight: Estimated body mass index is 29.18 kg/m  as calculated from the following:    Height as of this encounter: 1.626 m (5' 4\").    Weight as of this encounter: 77.1 kg (170 lb).       Diet: Regular Diet Adult    DVT Prophylaxis: Heparin ggt  Lopez Catheter: Not present  Central Lines: PRESENT  CVC Double Lumen Left Femoral Non - tunneled-Site Assessment: WDL  Cardiac Monitoring: None  Code Status: Full Code      Disposition Plan     Expected Discharge Date: 09/12/2022      Destination: home with family  Discharge Comments: pending improvement  Unknown  The patient's care was discussed with the Bedside Nurse, Patient and Hematology Consultant.    ARELY CASTAÑEDA MD  Hospitalist Service, GOLD TEAM [address] [place] [address]  Securely message with the Vocera Web Console (learn more here)  Text page via [facility] Paging/Directory   Please see signed in provider for up to date coverage information      Clinically Significant Risk Factors Present on Admission    "                ______________________________________________________________________    Interval History   Pt is having improvement of SOB. Had apheresis yesterday. Doing better overall. Agrees to do the CT with premedication     Data reviewed today: I reviewed all medications, new labs and imaging results over the last 24 hours.     Physical Exam   Vital Signs: Temp: 98.5  F (36.9  C) Temp src: Oral BP: 117/66 Pulse: 117   Resp: 26 SpO2: 98 % O2 Device: Oxymask Oxygen Delivery: 5 LPM  Weight: 170 lbs 0 oz  Constitutional: Mild distress lying in bed  Respiratory: Normal RR, No wheezez or crackles     Data

## 2022-09-08 NOTE — CONSULTS
Care Management Initial Consult    General Information  Assessment completed with: VM-chart review  Primary Care Provider verified and updated as needed:  Yes - Dr Claros at [facility] [place]  Readmission within the last 30 days: no previous admission in last 30 days   Reason for Consult: elevated risk for readmission, discharge planning  Advance Care Planning:  No scanned documents on file       Communication Assessment  Patient's communication style: spoken language (English or Bilingual)    Hearing Difficulty or Deaf: no   Wear Glasses or Blind: yes    Cognitive  Cognitive/Neuro/Behavioral: WDL       Living Environment:   People in home: parent(s), sibling(s)     Current living Arrangements: house      Able to return to prior arrangements: yes       Family/Social Support:  Care provided by: self  Provides care for: no one        Current Resources:   Patient receiving home care services: No  Equipment currently used at home: orthosis (RUE and RLE)      Employment/Financial:  Employment Status: disabled          Lifestyle & Psychosocial Needs:  Social Determinants of Health     Tobacco Use: Low Risk      Smoking Tobacco Use: Never Smoker     Smokeless Tobacco Use: Never Used   Alcohol Use: Not on file   Financial Resource Strain: Not on file   Food Insecurity: Not on file   Transportation Needs: Not on file   Physical Activity: Not on file   Stress: Not on file   Social Connections: Not on file   Intimate Partner Violence: Not At Risk     Fear of Current or Ex-Partner: No     Emotionally Abused: No     Physically Abused: No     Sexually Abused: No   Depression: Not at risk     PHQ-2 Score: 0   Housing Stability: Not on file       Functional Status:  Prior to admission patient needed assistance:   Dependent ADLs:: Independent  Dependent IADLs:: Independent         Jamaica Arita RN, BSN, PHN  Nurse Care Coordinator  Unit 5A, Rooms 5211-1 to 5219  Unit 5C, Rooms 1629-3054  M  [facility]   Desk phone & confidential voicemail: [phone]  Mon-Fri Pager: [phone]    To contact the On-call Weekend RNCC  [place] (0800 - 1630) Saturday and Sunday    Units: 4A, 4C, 4E, 5A and 5B - Pager 1: [phone]    Units: 6A, 6B, 6C, and 6D - Pager 2: [phone]    Units: 7A, 7B, 7C, 7D, and 5C- Pager 3: [phone]    [facility] [place] ([phone]) Saturday and Sunday    Units: 5 Ortho, 8A, 10 ICU, & Pediatric Units-Pager 4: [phone]

## 2022-09-09 ENCOUNTER — APPOINTMENT (OUTPATIENT)
Dept: CT IMAGING | Facility: CLINIC | Age: 22
DRG: 811 | End: 2022-09-09
Attending: STUDENT IN AN ORGANIZED HEALTH CARE EDUCATION/TRAINING PROGRAM
Payer: COMMERCIAL

## 2022-09-09 ENCOUNTER — APPOINTMENT (OUTPATIENT)
Dept: CARDIOLOGY | Facility: CLINIC | Age: 22
DRG: 811 | End: 2022-09-09
Attending: STUDENT IN AN ORGANIZED HEALTH CARE EDUCATION/TRAINING PROGRAM
Payer: COMMERCIAL

## 2022-09-09 LAB
ALBUMIN SERPL BCG-MCNC: 3.5 G/DL (ref 3.5–5.2)
ALP SERPL-CCNC: 87 U/L (ref 35–104)
ALT SERPL W P-5'-P-CCNC: 27 U/L (ref 10–35)
ANION GAP SERPL CALCULATED.3IONS-SCNC: 9 MMOL/L (ref 7–15)
AST SERPL W P-5'-P-CCNC: 40 U/L (ref 10–35)
BACTERIA BLD CULT: ABNORMAL
BACTERIA BLD CULT: ABNORMAL
BACTERIA SPEC CULT: ABNORMAL
BILIRUB SERPL-MCNC: 1.7 MG/DL
BUN SERPL-MCNC: 3.3 MG/DL (ref 6–20)
CALCIUM SERPL-MCNC: 8.3 MG/DL (ref 8.6–10)
CHLORIDE SERPL-SCNC: 106 MMOL/L (ref 98–107)
CREAT SERPL-MCNC: 0.45 MG/DL (ref 0.51–0.95)
DEPRECATED HCO3 PLAS-SCNC: 23 MMOL/L (ref 22–29)
ERYTHROCYTE [DISTWIDTH] IN BLOOD BY AUTOMATED COUNT: 17.2 % (ref 10–15)
GFR SERPL CREATININE-BSD FRML MDRD: >90 ML/MIN/1.73M2
GLUCOSE SERPL-MCNC: 139 MG/DL (ref 70–99)
HCT VFR BLD AUTO: 23.7 % (ref 35–47)
HGB BLD-MCNC: 8 G/DL (ref 11.7–15.7)
HGB S BLD QL: NORMAL
HGB S BLD QL: NORMAL
LACTATE SERPL-SCNC: 1.1 MMOL/L (ref 0.7–2)
LVEF ECHO: NORMAL
MCH RBC QN AUTO: 29.6 PG (ref 26.5–33)
MCHC RBC AUTO-ENTMCNC: 33.8 G/DL (ref 31.5–36.5)
MCV RBC AUTO: 88 FL (ref 78–100)
PLATELET # BLD AUTO: 211 10E3/UL (ref 150–450)
POTASSIUM SERPL-SCNC: 4.1 MMOL/L (ref 3.4–5.3)
PROT SERPL-MCNC: 6.3 G/DL (ref 6.4–8.3)
RBC # BLD AUTO: 2.7 10E6/UL (ref 3.8–5.2)
SODIUM SERPL-SCNC: 138 MMOL/L (ref 136–145)
UFH PPP CHRO-ACNC: 0.78 IU/ML
VANCOMYCIN SERPL-MCNC: 13.9 UG/ML
WBC # BLD AUTO: 15.9 10E3/UL (ref 4–11)

## 2022-09-09 PROCEDURE — 93325 DOPPLER ECHO COLOR FLOW MAPG: CPT | Mod: 26 | Performed by: INTERNAL MEDICINE

## 2022-09-09 PROCEDURE — 93325 DOPPLER ECHO COLOR FLOW MAPG: CPT

## 2022-09-09 PROCEDURE — 120N000002 HC R&B MED SURG/OB UMMC

## 2022-09-09 PROCEDURE — 999N000248 HC STATISTIC IV INSERT WITH US BY RN

## 2022-09-09 PROCEDURE — 250N000009 HC RX 250: Performed by: STUDENT IN AN ORGANIZED HEALTH CARE EDUCATION/TRAINING PROGRAM

## 2022-09-09 PROCEDURE — 258N000003 HC RX IP 258 OP 636: Performed by: STUDENT IN AN ORGANIZED HEALTH CARE EDUCATION/TRAINING PROGRAM

## 2022-09-09 PROCEDURE — 82040 ASSAY OF SERUM ALBUMIN: CPT | Performed by: STUDENT IN AN ORGANIZED HEALTH CARE EDUCATION/TRAINING PROGRAM

## 2022-09-09 PROCEDURE — 250N000011 HC RX IP 250 OP 636: Performed by: STUDENT IN AN ORGANIZED HEALTH CARE EDUCATION/TRAINING PROGRAM

## 2022-09-09 PROCEDURE — 85520 HEPARIN ASSAY: CPT | Performed by: STUDENT IN AN ORGANIZED HEALTH CARE EDUCATION/TRAINING PROGRAM

## 2022-09-09 PROCEDURE — 250N000013 HC RX MED GY IP 250 OP 250 PS 637: Performed by: STUDENT IN AN ORGANIZED HEALTH CARE EDUCATION/TRAINING PROGRAM

## 2022-09-09 PROCEDURE — 99233 SBSQ HOSP IP/OBS HIGH 50: CPT | Performed by: STUDENT IN AN ORGANIZED HEALTH CARE EDUCATION/TRAINING PROGRAM

## 2022-09-09 PROCEDURE — 36591 DRAW BLOOD OFF VENOUS DEVICE: CPT | Performed by: STUDENT IN AN ORGANIZED HEALTH CARE EDUCATION/TRAINING PROGRAM

## 2022-09-09 PROCEDURE — 93308 TTE F-UP OR LMTD: CPT | Mod: 26 | Performed by: INTERNAL MEDICINE

## 2022-09-09 PROCEDURE — 80053 COMPREHEN METABOLIC PANEL: CPT | Performed by: STUDENT IN AN ORGANIZED HEALTH CARE EDUCATION/TRAINING PROGRAM

## 2022-09-09 PROCEDURE — 83605 ASSAY OF LACTIC ACID: CPT | Performed by: STUDENT IN AN ORGANIZED HEALTH CARE EDUCATION/TRAINING PROGRAM

## 2022-09-09 PROCEDURE — 36592 COLLECT BLOOD FROM PICC: CPT | Performed by: STUDENT IN AN ORGANIZED HEALTH CARE EDUCATION/TRAINING PROGRAM

## 2022-09-09 PROCEDURE — 71275 CT ANGIOGRAPHY CHEST: CPT | Mod: 26 | Performed by: RADIOLOGY

## 2022-09-09 PROCEDURE — 93308 TTE F-UP OR LMTD: CPT

## 2022-09-09 PROCEDURE — 250N000011 HC RX IP 250 OP 636: Performed by: PHYSICIAN ASSISTANT

## 2022-09-09 PROCEDURE — 80202 ASSAY OF VANCOMYCIN: CPT | Performed by: STUDENT IN AN ORGANIZED HEALTH CARE EDUCATION/TRAINING PROGRAM

## 2022-09-09 PROCEDURE — 85027 COMPLETE CBC AUTOMATED: CPT | Performed by: STUDENT IN AN ORGANIZED HEALTH CARE EDUCATION/TRAINING PROGRAM

## 2022-09-09 PROCEDURE — 71275 CT ANGIOGRAPHY CHEST: CPT

## 2022-09-09 PROCEDURE — 93321 DOPPLER ECHO F-UP/LMTD STD: CPT | Mod: 26 | Performed by: INTERNAL MEDICINE

## 2022-09-09 RX ORDER — KETOROLAC TROMETHAMINE 30 MG/ML
30 INJECTION, SOLUTION INTRAMUSCULAR; INTRAVENOUS 2 TIMES DAILY PRN
Status: DISCONTINUED | OUTPATIENT
Start: 2022-09-09 | End: 2022-09-09

## 2022-09-09 RX ORDER — KETOROLAC TROMETHAMINE 30 MG/ML
30 INJECTION, SOLUTION INTRAMUSCULAR; INTRAVENOUS 3 TIMES DAILY PRN
Status: DISPENSED | OUTPATIENT
Start: 2022-09-09 | End: 2022-09-13

## 2022-09-09 RX ORDER — SODIUM CHLORIDE 9 MG/ML
INJECTION, SOLUTION INTRAVENOUS CONTINUOUS
Status: DISCONTINUED | OUTPATIENT
Start: 2022-09-09 | End: 2022-09-10

## 2022-09-09 RX ORDER — DIPHENHYDRAMINE HCL 25 MG
25 CAPSULE ORAL ONCE
Status: COMPLETED | OUTPATIENT
Start: 2022-09-09 | End: 2022-09-09

## 2022-09-09 RX ORDER — IOPAMIDOL 755 MG/ML
64 INJECTION, SOLUTION INTRAVASCULAR ONCE
Status: COMPLETED | OUTPATIENT
Start: 2022-09-09 | End: 2022-09-09

## 2022-09-09 RX ADMIN — ASPIRIN 81 MG CHEWABLE TABLET 81 MG: 81 TABLET CHEWABLE at 18:53

## 2022-09-09 RX ADMIN — FLUTICASONE FUROATE AND VILANTEROL TRIFENATATE 1 PUFF: 200; 25 POWDER RESPIRATORY (INHALATION) at 10:56

## 2022-09-09 RX ADMIN — KETOROLAC TROMETHAMINE 30 MG: 30 INJECTION, SOLUTION INTRAMUSCULAR at 15:29

## 2022-09-09 RX ADMIN — VANCOMYCIN HYDROCHLORIDE 1250 MG: 10 INJECTION, POWDER, LYOPHILIZED, FOR SOLUTION INTRAVENOUS at 01:04

## 2022-09-09 RX ADMIN — CEFTRIAXONE SODIUM 2 G: 2 INJECTION, POWDER, FOR SOLUTION INTRAMUSCULAR; INTRAVENOUS at 16:35

## 2022-09-09 RX ADMIN — OXYCODONE HYDROCHLORIDE 20 MG: 10 TABLET ORAL at 14:47

## 2022-09-09 RX ADMIN — OXYCODONE HYDROCHLORIDE 20 MG: 10 TABLET ORAL at 10:54

## 2022-09-09 RX ADMIN — IOPAMIDOL 64 ML: 755 INJECTION, SOLUTION INTRAVENOUS at 02:20

## 2022-09-09 RX ADMIN — HYDROMORPHONE HYDROCHLORIDE 0.5 MG: 1 INJECTION, SOLUTION INTRAMUSCULAR; INTRAVENOUS; SUBCUTANEOUS at 01:00

## 2022-09-09 RX ADMIN — DIPHENHYDRAMINE HYDROCHLORIDE 50 MG: 25 CAPSULE ORAL at 01:01

## 2022-09-09 RX ADMIN — OXYCODONE HYDROCHLORIDE 20 MG: 10 TABLET ORAL at 18:53

## 2022-09-09 RX ADMIN — AZITHROMYCIN MONOHYDRATE 500 MG: 250 TABLET ORAL at 06:24

## 2022-09-09 RX ADMIN — OXYCODONE HYDROCHLORIDE 20 MG: 10 TABLET ORAL at 06:29

## 2022-09-09 RX ADMIN — DIPHENHYDRAMINE HYDROCHLORIDE 25 MG: 25 CAPSULE ORAL at 15:29

## 2022-09-09 RX ADMIN — SODIUM CHLORIDE, PRESERVATIVE FREE 94 ML: 5 INJECTION INTRAVENOUS at 02:20

## 2022-09-09 RX ADMIN — SODIUM CHLORIDE, PRESERVATIVE FREE: 5 INJECTION INTRAVENOUS at 08:27

## 2022-09-09 RX ADMIN — HYDROXYUREA 3000 MG: 500 CAPSULE ORAL at 08:31

## 2022-09-09 RX ADMIN — KETAMINE HYDROCHLORIDE 8 MG/HR: 100 INJECTION, SOLUTION, CONCENTRATE INTRAMUSCULAR; INTRAVENOUS at 04:28

## 2022-09-09 RX ADMIN — SENNOSIDES AND DOCUSATE SODIUM 1 TABLET: 50; 8.6 TABLET ORAL at 08:31

## 2022-09-09 RX ADMIN — ASPIRIN 81 MG CHEWABLE TABLET 81 MG: 81 TABLET CHEWABLE at 08:31

## 2022-09-09 RX ADMIN — OXYCODONE HYDROCHLORIDE 20 MG: 10 TABLET ORAL at 22:03

## 2022-09-09 RX ADMIN — VANCOMYCIN HYDROCHLORIDE 1250 MG: 10 INJECTION, POWDER, LYOPHILIZED, FOR SOLUTION INTRAVENOUS at 17:42

## 2022-09-09 RX ADMIN — KETAMINE HYDROCHLORIDE 8 MG/HR: 100 INJECTION, SOLUTION, CONCENTRATE INTRAMUSCULAR; INTRAVENOUS at 16:39

## 2022-09-09 RX ADMIN — VANCOMYCIN HYDROCHLORIDE 1250 MG: 10 INJECTION, POWDER, LYOPHILIZED, FOR SOLUTION INTRAVENOUS at 08:30

## 2022-09-09 ASSESSMENT — ACTIVITIES OF DAILY LIVING (ADL)
ADLS_ACUITY_SCORE: 27
ADLS_ACUITY_SCORE: 23
ADLS_ACUITY_SCORE: 27

## 2022-09-09 NOTE — PLAN OF CARE
"Goal Outcome Evaluation:    Plan of Care Reviewed With: patient     Overall Patient Progress: no change    Outcome Evaluation: VSS on RA. Weaned to 3L NC, patient intermittently takes oxygen off and desats to 86-90%. Oxygen monitored continuously. Incentive spirometry encouraged, patient self administering. Pain rating 7-9/10 all day, pt states tylenol and ketorolac \"don't work\" and then declines, team informed and will continue to hold dilaudid due to increasing respiratory needs yesterday, nonpharm pain management encouraged. Senna given x1 to for bowel regimen, LBM 9/6. Fair appetite, had late breakfast and no interest in ordering lunch, assisted with dinner order. ECHO complete, results WDL. Potassium 4.1, recheck ordered for tomorrow.     "

## 2022-09-09 NOTE — PROGRESS NOTES
[facility]    Medicine Progress Note - Hospitalist Service, GOLD TEAM 8    Date of Admission:  9/5/2022    Assessment & Plan            22 year old female admitted on 9/5/2022. She has a past medical history of sickle cell disease, CVA, PE, and cognitive delay who presents with shortness of breath and chest pain with Acute chest pain syndrome with concern for PNA on IV Abx due to O2 requirement which also could be due to over sedation. PE excluded and off AC.        # Acute chest syndrome  # Leukocytosis (neutrophil predominant)  # Acute hypoxic respiratory syndrome  CXR at admission with lower lung volumes but new opacity in both lung bases (atelectasis versus infiltrate possible).  Negative COVID.  Hematology has seen and given leukocytosis, elevated procal, and CXR findings concerning for acute chest.  Procal mildly elevated, negative troponin, and lactate not elevated.  Given Zosyn in the ED, Got 2 units of blood also 09/06. Strep PNA neg, legionella neg and Resp panel neg. MRSA nares positive. CT PE neg and prophylactic AC stopped but some concern for PNA.     -Consult hematology, appreciate recs. S/p 2 units of simple transfusion and Exchange transfusion 09/07  -Start ceftriaxone and azithromycin SOT 09/05 Added Vanc due to fever and MRSA nares being positive and increased O2 requirement  - Blood Cx 1/2 MRSE likely contaminant. Repeat neg  - Encourage incentive spirometry  - Pain control with Oxycodone 20 q4h, Ketamine at 8mg/h. IV dilaudid discontinued due to concern for over sedation. Ketorolac BID PRN added instead  - Monitor CBC w/ diff, retic count, bilirubin in AM              - Do not transfuse blood unless discussed with Hematology first. Do not transfuse based solely off hemoglobin level given risk of iron overload and risk of developing RBC antibodies.   - Monitor O2 sats and vitals closely  - Telemetry  - Hydrea and deferasirox per hematology  - IVF:  "Continue NS reduced to 75 per hour  - Regular diet  - TTE ordered. Pending        # Sickle cell anemia  # History of CVA with RUE hemiparesis  # Sickle Cell Pain Crisis  # Back pain  # Hx of PE  Presented earlier in the day with sickle cell pain crisis (back pain) and was discharged after treatment with Toradol, ketamine, LR, and oxycodone. Subsequently developed chest pain and SOB prior to returning to ED. Of note she has prior treatment plan in chart.  Discussed continuing home regimen for the time being and patient in agreement.  - Pain tx as above   -Zofran as needed  -Continue ASA  -Senna PRN, pending stool output may need to schedule     # Asthma  Continue PTA Symbicort (Breo Ellipta sub per pharm) and albuterol      # Hyponatremia: Present on admission Resolved with fluids    # Overweight: Estimated body mass index is 29.18 kg/m  as calculated from the following:    Height as of this encounter: 1.626 m (5' 4\").    Weight as of this encounter: 77.1 kg (170 lb).       Diet: Regular Diet Adult    DVT Prophylaxis: Heparin ggt  Lopez Catheter: Not present  Central Lines: PRESENT  CVC Double Lumen Left Femoral Non - tunneled-Site Assessment: WDL  Cardiac Monitoring: ACTIVE order. Indication: Chest pain/ ACS rule out (24 hours)  Code Status: Full Code      Disposition Plan      Expected Discharge Date: 09/12/2022      Destination: home with family  Discharge Comments: pending improvement  Unknown  The patient's care was discussed with the Bedside Nurse, Patient and Hematology Consultant.    ARELY CASTAÑEDA MD  Hospitalist Service, GOLD TEAM [address] [place] [address]  Securely message with the Vocera Web Console (learn more here)  Text page via [employer] Paging/Directory   Please see signed in provider for up to date coverage information      Clinically Significant Risk Factors Present on Admission               "     ______________________________________________________________________    Interval History   Pt is saying she is in severe pain but doesn't look in acute distress. Discussed discontinuing Dilaudid for now and continue above regimen. Encouraged IS use.     Data reviewed today: I reviewed all medications, new labs and imaging results over the last 24 hours.     Physical Exam   Vital Signs: Temp: 97.6  F (36.4  C) Temp src: Axillary BP: 113/69 Pulse: 93   Resp: 23 SpO2: 94 % O2 Device: Oxymask Oxygen Delivery: 5 LPM  Weight: 188 lbs 7.89 oz  Constitutional: Mild distress lying in bed  Respiratory: Normal RR, No wheezez or crackles     Data

## 2022-09-09 NOTE — PROGRESS NOTES
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 Admitted to medicine service 9/5/2022 for acute sickle cell pain crisis with acute chest syndrome.    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 Presented initially with diffuse back pain (typical for her pain crisis), later developed chest pain and hypoxia.     There was concern for PE given prior history of recurrent VTEs (in the setting of subtherapeutic INR), elevated D-dimer at 10.44 (although non-specific) and patient is not on anticoagulant PTA (on ASA BID). Was on heparin initially with delay in getting CTA due to contrast allergy, later came back negative and heparin discontinued. Also has leukocytosis, elevated procalcitonin with possible new infiltration on both lower lungs concern for CAP. This clinical picture of chest pain, hypoxia, and CXR infiltration can represent acute chest syndrome as well. So far, received 2 units of pRBC 9/6 and exchange transfusion 9/7 (HbS 66.5% -> 19.9%, at goal below 30%). Now clinically stable with improvement in O2 requirement.     Recommendations:  - No indication for further exchange transfusion  - Continue Hydrea 3000 mg daily  - Pain management per primary team. Please  "use care plan as a guidance for pain management as well as other supportive care  - Agree with empiric coverage for CAP (currently on ceftriaxone and azithromycin). Further work-up for AHRF per primary team.     We will continue to follow this patient while hospitalized. Please do not hesitate to page with any questions or concerns.     Patient was seen and plan of care was discussed with attending physician Dr. Cogan.    Ramesh Aguila MD  Hematology/Medical Oncology (PGY-4)  P: [phone]  ------------------------------------------------------------------------------------------------------------------------------------    Interval History:  Notes reviewed, no acute event overnight.     Today, patient complains of itching on her L arm. States that her breathing is okay and denies cough or increased sputum production. Back pain is okay. Denies fever or chills. Denies abdominal pain.    Physical Exam:    Blood pressure 122/60, pulse 99, temperature 97.9  F (36.6  C), temperature source Oral, resp. rate 22, height 1.626 m (5' 4\"), weight 84.1 kg (185 lb 6.5 oz), SpO2 95 %, not currently breastfeeding.  General: Alert and cooperative, lying in bed, in acute distress  HEENT: Sclera anicteric, EOMI, MMM  CV: RRR, no murmurs  Resp: CTAB, normal respiratory effort on ambient air  GI: Soft, non-tender, non-distended  MSK: Warm and well-perfused, no edema  Skin: No rashes or petechiae  Neuro: R hemiparesis    Labs & Studies: I personally reviewed the following studies:  ROUTINE LABS (Last four results):  CMP    CBC  Recent Labs   Lab 09/09/22  0345 09/08/22  1031 09/07/22 2051 09/07/22 1943 09/07/22  0748 09/06/22 1911 09/06/22  0642   WBC 15.9* 13.3*  --   --  19.0*  --  21.4*   RBC 2.70* 2.58*  --   --  2.73*  --  2.12*   HGB 8.0* 7.9* 8.6* 8.1* 8.3*   < > 6.6*   HCT 23.7* 22.5* 25.7* 23.8* 23.9*  --  18.4*   MCV 88 87  --   --  88  --  87   MCH 29.6 30.6  --   --  30.4  --  31.1   MCHC 33.8 35.1  --   --  34.7  " --  35.9   RDW 17.2* 16.7*  --   --  20.8*  --  24.0*    182  --   --  310  --  287    < > = values in this interval not displayed.     Labs and images are reviewed and discussed as pertinent in assessment and plan.

## 2022-09-09 NOTE — PROVIDER NOTIFICATION
paged per patient request. Patient interested in altering pain management plan and trying as needed dilaudid instead of continuous ketamine. Plan for MD to see patient at bedside, hopefully this afternoon, to discuss pain management plan.

## 2022-09-09 NOTE — PLAN OF CARE
Goal Outcome Evaluation:    Plan of Care Reviewed With: patient     Overall Patient Progress: no change    Outcome Evaluation: Pt is A/Ox4, lethargic but when awake is alert. Triggered sepsis with WBC/HR, LA negative at 1.1. VSS, ex HTN, on recheck stabilizing. Recieved one dose of IV dilaudid and 2 doses of oxycodone for pain. Had chest CT done this morning at 2am, no complications or allergic reactions.Pending results. On 5-6L oxymask, satting mid 90s, tends to desat with increased pain/getting up out of bed, bouncing back up quickly. Urinating frequently, having incontinence due to urgency. LBM 9/6, encouarge oral intake as appetite has been poor. Potassium came back 3.6, recheck ordered for AM. Hep gtt continued, gave one bolus for Xa >.10. Currently running at 950 units/hr, recheck Xa at 11:30am.

## 2022-09-10 LAB
ALBUMIN SERPL BCG-MCNC: 3.1 G/DL (ref 3.5–5.2)
ALP SERPL-CCNC: 77 U/L (ref 35–104)
ALT SERPL W P-5'-P-CCNC: 23 U/L (ref 10–35)
ANION GAP SERPL CALCULATED.3IONS-SCNC: 6 MMOL/L (ref 7–15)
AST SERPL W P-5'-P-CCNC: 28 U/L (ref 10–35)
BILIRUB SERPL-MCNC: 1.4 MG/DL
BUN SERPL-MCNC: 6.5 MG/DL (ref 6–20)
CALCIUM SERPL-MCNC: 8 MG/DL (ref 8.6–10)
CHLORIDE SERPL-SCNC: 108 MMOL/L (ref 98–107)
CREAT SERPL-MCNC: 0.58 MG/DL (ref 0.51–0.95)
DEPRECATED HCO3 PLAS-SCNC: 26 MMOL/L (ref 22–29)
ERYTHROCYTE [DISTWIDTH] IN BLOOD BY AUTOMATED COUNT: 19.3 % (ref 10–15)
GFR SERPL CREATININE-BSD FRML MDRD: >90 ML/MIN/1.73M2
GLUCOSE SERPL-MCNC: 86 MG/DL (ref 70–99)
HCT VFR BLD AUTO: 22.3 % (ref 35–47)
HGB BLD-MCNC: 7.2 G/DL (ref 11.7–15.7)
MCH RBC QN AUTO: 29.3 PG (ref 26.5–33)
MCHC RBC AUTO-ENTMCNC: 32.3 G/DL (ref 31.5–36.5)
MCV RBC AUTO: 91 FL (ref 78–100)
PLATELET # BLD AUTO: 252 10E3/UL (ref 150–450)
POTASSIUM SERPL-SCNC: 3.4 MMOL/L (ref 3.4–5.3)
POTASSIUM SERPL-SCNC: 3.4 MMOL/L (ref 3.4–5.3)
PROT SERPL-MCNC: 5.8 G/DL (ref 6.4–8.3)
RBC # BLD AUTO: 2.46 10E6/UL (ref 3.8–5.2)
RETICS # AUTO: 0.26 10E6/UL (ref 0.03–0.1)
RETICS/RBC NFR AUTO: 10.2 % (ref 0.5–2)
SODIUM SERPL-SCNC: 140 MMOL/L (ref 136–145)
WBC # BLD AUTO: 16.6 10E3/UL (ref 4–11)

## 2022-09-10 PROCEDURE — 258N000003 HC RX IP 258 OP 636: Performed by: STUDENT IN AN ORGANIZED HEALTH CARE EDUCATION/TRAINING PROGRAM

## 2022-09-10 PROCEDURE — 80053 COMPREHEN METABOLIC PANEL: CPT | Performed by: STUDENT IN AN ORGANIZED HEALTH CARE EDUCATION/TRAINING PROGRAM

## 2022-09-10 PROCEDURE — 36592 COLLECT BLOOD FROM PICC: CPT | Performed by: STUDENT IN AN ORGANIZED HEALTH CARE EDUCATION/TRAINING PROGRAM

## 2022-09-10 PROCEDURE — 250N000013 HC RX MED GY IP 250 OP 250 PS 637: Performed by: STUDENT IN AN ORGANIZED HEALTH CARE EDUCATION/TRAINING PROGRAM

## 2022-09-10 PROCEDURE — 250N000011 HC RX IP 250 OP 636: Performed by: STUDENT IN AN ORGANIZED HEALTH CARE EDUCATION/TRAINING PROGRAM

## 2022-09-10 PROCEDURE — 250N000009 HC RX 250: Performed by: STUDENT IN AN ORGANIZED HEALTH CARE EDUCATION/TRAINING PROGRAM

## 2022-09-10 PROCEDURE — 99233 SBSQ HOSP IP/OBS HIGH 50: CPT | Performed by: STUDENT IN AN ORGANIZED HEALTH CARE EDUCATION/TRAINING PROGRAM

## 2022-09-10 PROCEDURE — 999N000202 HC STATISTICAL VASC ACCESS NURSE TIME, 1-15 MINUTES

## 2022-09-10 PROCEDURE — 85045 AUTOMATED RETICULOCYTE COUNT: CPT | Performed by: STUDENT IN AN ORGANIZED HEALTH CARE EDUCATION/TRAINING PROGRAM

## 2022-09-10 PROCEDURE — 84132 ASSAY OF SERUM POTASSIUM: CPT | Performed by: STUDENT IN AN ORGANIZED HEALTH CARE EDUCATION/TRAINING PROGRAM

## 2022-09-10 PROCEDURE — 85027 COMPLETE CBC AUTOMATED: CPT | Performed by: STUDENT IN AN ORGANIZED HEALTH CARE EDUCATION/TRAINING PROGRAM

## 2022-09-10 PROCEDURE — 83605 ASSAY OF LACTIC ACID: CPT | Performed by: STUDENT IN AN ORGANIZED HEALTH CARE EDUCATION/TRAINING PROGRAM

## 2022-09-10 PROCEDURE — 999N000147 HC STATISTIC PT IP EVAL DEFER

## 2022-09-10 PROCEDURE — 120N000002 HC R&B MED SURG/OB UMMC

## 2022-09-10 RX ORDER — POTASSIUM CHLORIDE 7.45 MG/ML
10 INJECTION INTRAVENOUS
Status: COMPLETED | OUTPATIENT
Start: 2022-09-10 | End: 2022-09-10

## 2022-09-10 RX ORDER — AMOXICILLIN 250 MG
2 CAPSULE ORAL 2 TIMES DAILY
Status: DISCONTINUED | OUTPATIENT
Start: 2022-09-10 | End: 2022-09-13 | Stop reason: HOSPADM

## 2022-09-10 RX ORDER — AMOXICILLIN 250 MG
1 CAPSULE ORAL 2 TIMES DAILY
Status: DISCONTINUED | OUTPATIENT
Start: 2022-09-10 | End: 2022-09-13 | Stop reason: HOSPADM

## 2022-09-10 RX ORDER — METHOCARBAMOL 500 MG/1
500 TABLET, FILM COATED ORAL 4 TIMES DAILY PRN
Status: DISCONTINUED | OUTPATIENT
Start: 2022-09-10 | End: 2022-09-13 | Stop reason: HOSPADM

## 2022-09-10 RX ORDER — ENOXAPARIN SODIUM 100 MG/ML
40 INJECTION SUBCUTANEOUS EVERY 24 HOURS
Status: DISCONTINUED | OUTPATIENT
Start: 2022-09-10 | End: 2022-09-13 | Stop reason: HOSPADM

## 2022-09-10 RX ORDER — POLYETHYLENE GLYCOL 3350 17 G/17G
17 POWDER, FOR SOLUTION ORAL DAILY
Status: DISCONTINUED | OUTPATIENT
Start: 2022-09-10 | End: 2022-09-13 | Stop reason: HOSPADM

## 2022-09-10 RX ADMIN — SENNOSIDES AND DOCUSATE SODIUM 2 TABLET: 50; 8.6 TABLET ORAL at 20:23

## 2022-09-10 RX ADMIN — FLUTICASONE FUROATE AND VILANTEROL TRIFENATATE 1 PUFF: 200; 25 POWDER RESPIRATORY (INHALATION) at 10:02

## 2022-09-10 RX ADMIN — POTASSIUM CHLORIDE 10 MEQ: 7.45 INJECTION INTRAVENOUS at 14:41

## 2022-09-10 RX ADMIN — KETOROLAC TROMETHAMINE 30 MG: 30 INJECTION, SOLUTION INTRAMUSCULAR at 09:54

## 2022-09-10 RX ADMIN — POTASSIUM CHLORIDE 10 MEQ: 7.45 INJECTION INTRAVENOUS at 15:54

## 2022-09-10 RX ADMIN — HYDROXYUREA 3000 MG: 500 CAPSULE ORAL at 09:57

## 2022-09-10 RX ADMIN — KETAMINE HYDROCHLORIDE 6 MG/HR: 100 INJECTION, SOLUTION, CONCENTRATE INTRAMUSCULAR; INTRAVENOUS at 18:59

## 2022-09-10 RX ADMIN — KETAMINE HYDROCHLORIDE 8 MG/HR: 100 INJECTION, SOLUTION, CONCENTRATE INTRAMUSCULAR; INTRAVENOUS at 05:14

## 2022-09-10 RX ADMIN — VANCOMYCIN HYDROCHLORIDE 1250 MG: 10 INJECTION, POWDER, LYOPHILIZED, FOR SOLUTION INTRAVENOUS at 10:02

## 2022-09-10 RX ADMIN — SENNOSIDES AND DOCUSATE SODIUM 2 TABLET: 50; 8.6 TABLET ORAL at 09:58

## 2022-09-10 RX ADMIN — KETOROLAC TROMETHAMINE 30 MG: 30 INJECTION, SOLUTION INTRAMUSCULAR at 15:55

## 2022-09-10 RX ADMIN — CEFTRIAXONE SODIUM 2 G: 2 INJECTION, POWDER, FOR SOLUTION INTRAMUSCULAR; INTRAVENOUS at 17:17

## 2022-09-10 RX ADMIN — VANCOMYCIN HYDROCHLORIDE 1250 MG: 10 INJECTION, POWDER, LYOPHILIZED, FOR SOLUTION INTRAVENOUS at 01:46

## 2022-09-10 RX ADMIN — METHOCARBAMOL 500 MG: 500 TABLET, FILM COATED ORAL at 21:37

## 2022-09-10 RX ADMIN — OXYCODONE HYDROCHLORIDE 20 MG: 10 TABLET ORAL at 14:42

## 2022-09-10 RX ADMIN — ASPIRIN 81 MG CHEWABLE TABLET 81 MG: 81 TABLET CHEWABLE at 20:23

## 2022-09-10 RX ADMIN — OXYCODONE HYDROCHLORIDE 20 MG: 10 TABLET ORAL at 09:57

## 2022-09-10 RX ADMIN — ASPIRIN 81 MG CHEWABLE TABLET 81 MG: 81 TABLET CHEWABLE at 09:57

## 2022-09-10 RX ADMIN — POTASSIUM CHLORIDE 10 MEQ: 7.45 INJECTION INTRAVENOUS at 13:35

## 2022-09-10 RX ADMIN — METHOCARBAMOL 500 MG: 500 TABLET, FILM COATED ORAL at 10:55

## 2022-09-10 RX ADMIN — VANCOMYCIN HYDROCHLORIDE 1250 MG: 10 INJECTION, POWDER, LYOPHILIZED, FOR SOLUTION INTRAVENOUS at 17:55

## 2022-09-10 RX ADMIN — ENOXAPARIN SODIUM 40 MG: 40 INJECTION SUBCUTANEOUS at 14:46

## 2022-09-10 RX ADMIN — POTASSIUM CHLORIDE 10 MEQ: 7.45 INJECTION INTRAVENOUS at 12:27

## 2022-09-10 RX ADMIN — KETOROLAC TROMETHAMINE 30 MG: 30 INJECTION, SOLUTION INTRAMUSCULAR at 23:39

## 2022-09-10 RX ADMIN — OXYCODONE HYDROCHLORIDE 20 MG: 10 TABLET ORAL at 20:23

## 2022-09-10 RX ADMIN — OXYCODONE HYDROCHLORIDE 20 MG: 10 TABLET ORAL at 02:53

## 2022-09-10 ASSESSMENT — ACTIVITIES OF DAILY LIVING (ADL)
ADLS_ACUITY_SCORE: 29
ADLS_ACUITY_SCORE: 32
ADLS_ACUITY_SCORE: 32
ADLS_ACUITY_SCORE: 29
ADLS_ACUITY_SCORE: 27
ADLS_ACUITY_SCORE: 29

## 2022-09-10 NOTE — CARE PLAN
Goal Outcome Evaluation:     Plan of Care Reviewed With: patient      Overall Patient Progress: no change     Outcome Evaluation: VSS on 1.5L NC ex Tachycardic. Intermittently takes oxygen off and patient begins to desat. On continuous O2 monitoring. On ketamine gtt and oxycodone given for pain, pain. Up with SBA to commode. Hasn't had a BM. Incentive spirometer encouraged will patients awake.

## 2022-09-10 NOTE — PLAN OF CARE
Physical Therapy: Defer - Orders received. Chart reviewed and discussed with care team.? Physical Therapy not indicated due to patient declining need for IP PT and presenting at functional baseline.? Patient's mother present via telephone during therapist conversation with patient. Per discussion with patient and mother, she is IND with most mobility/ADLs at home. Patient reports her mother is her PCA. Patient has good support system and anticipating safe discharge to home with family. Educated pt on role of PT in hospital setting. Patient currently reports no mobility concerns or need for PT while admitted in hospital. Defer discharge recommendations to medical team.? Will complete orders. Please re-order PT if mobility status changes or further needs arise.

## 2022-09-10 NOTE — PHARMACY-VANCOMYCIN DOSING SERVICE
Pharmacy Vancomycin Note  Date of Service 2022  Patient's  2000   22 year old, female    Indication: Sepsis  Day of Therapy: 3  Current vancomycin regimen: 1250 mg IV q8h  Current vancomycin monitoring method: AUC  Current vancomycin therapeutic monitoring goal: 400-600 mg*h/L    Insight[employer] Prediction of Current Vancomycin Regimen    Regimen: 1250 mg IV every 8 hours.  Exposure target: AUC24 (range)400-600 mg/L.hr   AUC24,ss: 514 mg/L.hr  Probability of AUC24 > 400: 94 %  Ctrough,ss: 14.2 mg/L  Probability of Ctrough,ss > 20: 3 %  Probability of nephrotoxicity (Lodise ANA LAURA ): 9 %    Current estimated CrCl = Estimated Creatinine Clearance: 204.1 mL/min (A) (based on SCr of 0.45 mg/dL (L)).    Creatinine for last 3 days  2022:  7:48 AM Creatinine 0.52 mg/dL  2022: 10:31 AM Creatinine 0.48 mg/dL  2022:  3:45 AM Creatinine 0.45 mg/dL    Recent Vancomycin Levels (past 3 days)  2022: 10:31 AM Vancomycin 8.7 ug/mL  2022:  5:16 PM Vancomycin 13.9 ug/mL    Vancomycin IV Administrations (past 72 hours)                   vancomycin 1250 mg in 0.9% NaCl 250 mL intermittent infusion 1,250 mg (mg) 1,250 mg New Bag 22 1742     1,250 mg New Bag  0830     1,250 mg New Bag  0104     1,250 mg New Bag 22 1802    vancomycin (VANCOCIN) 1000 mg in dextrose 5% 200 mL PREMIX (mg) 1,000 mg New Bag 22 1202     1,000 mg New Bag  0238     1,000 mg New Bag 22 1858     1,000 mg New Bag  1210    vancomycin (VANCOCIN) 1,750 mg in sodium chloride 0.9 % 500 mL intermittent infusion (mg) 1,750 mg New Bag 22 0541                Nephrotoxins and other renal medications (From now, onward)    Start     Dose/Rate Route Frequency Ordered Stop    22 1600  ketorolac (TORADOL) injection 30 mg         30 mg Intravenous 3 TIMES DAILY PRN 22 1535 22 1559    22 1700  vancomycin 1250 mg in 0.9% NaCl 250 mL intermittent infusion 1,250 mg         1,250 mg  over 90 Minutes  Intravenous EVERY 8 HOURS 09/08/22 1353               Contrast Orders - past 72 hours (72h ago, onward)    Start     Dose/Rate Route Frequency Stop    09/09/22 0200  iopamidol (ISOVUE-370) solution 64 mL         64 mL Intravenous ONCE 09/09/22 0220          Interpretation of levels and current regimen:  Vancomycin level is reflective of -600    Has serum creatinine changed greater than 50% in last 72 hours: No    Urine output:  good urine output    Renal Function: Stable      Plan:  1. Continue current dose.  2. Vancomycin monitoring method: AUC  3. Vancomycin therapeutic monitoring goal: 400-600 mg*h/L  4. Pharmacy will check vancomycin levels as appropriate in 3-5 Days.  5. Serum creatinine levels will be ordered daily for the first week of therapy and at least twice weekly for subsequent weeks.    Chhaya Harman, PharmD

## 2022-09-10 NOTE — PROGRESS NOTES
[facility]    Medicine Progress Note - Hospitalist Service, GOLD TEAM 8    Date of Admission:  9/5/2022    Assessment & Plan            22 year old female admitted on 9/5/2022. She has a past medical history of sickle cell disease, CVA, PE, and cognitive delay who presents with shortness of breath and chest pain with Acute chest pain syndrome with concern for PNA on IV Abx due to O2 requirement which also could be due to over sedation. PE excluded and off AC.        # Acute chest syndrome  # Leukocytosis (neutrophil predominant)  # Acute hypoxic respiratory syndrome: Improving  CXR at admission with lower lung volumes but new opacity in both lung bases (atelectasis versus infiltrate possible).  Negative COVID.  Hematology has seen and given leukocytosis, elevated procal, and CXR findings concerning for acute chest.  Procal mildly elevated, negative troponin, and lactate not elevated.  Given Zosyn in the ED, Got 2 units of blood also 09/06. Strep PNA neg, legionella neg and Resp panel neg. MRSA nares positive. CT PE neg and prophylactic AC stopped but some concern for PNA. TTE neg for Pul HTN    -Consult hematology, appreciate recs. S/p 2 units of simple transfusion and Exchange transfusion 09/07  -Start ceftriaxone and azithromycin SOT 09/05 Added Vanc due to fever and MRSA nares being positive and increased O2 requirement. EOT for CTX and Vanc 09/11  - Blood Cx 1/2 MRSE likely contaminant. Repeat neg  - Encourage incentive spirometry  - Pain control with Oxycodone 20 q4h, Ketamine at 6mg/h. IV dilaudid discontinued due to concern for over sedation. Ketorolac BID PRN added instead  - Monitor CBC w/ diff, retic count, bilirubin in AM              - Do not transfuse blood unless discussed with Hematology first. Do not transfuse based solely off hemoglobin level given risk of iron overload and risk of developing RBC antibodies.   - Monitor O2 sats and vitals closely  -  "Telemetry  - Hydrea and deferasirox per hematology  - Regular diet. Pt is eating and drinking      # Sickle cell anemia  # History of CVA with RUE hemiparesis  # Sickle Cell Pain Crisis  # Back pain  # Hx of PE  Presented earlier in the day with sickle cell pain crisis (back pain) and was discharged after treatment with Toradol, ketamine, LR, and oxycodone. Subsequently developed chest pain and SOB prior to returning to ED. Of note she has prior treatment plan in chart.  Discussed continuing home regimen for the time being and patient in agreement.  - Pain tx as above   -Zofran as needed  -Continue ASA  -Senna PRN, pending stool output may need to schedule     # Asthma  Continue PTA Symbicort (Breo Ellipta sub per pharm) and albuterol      # Hyponatremia: Present on admission Resolved with fluids    # Overweight: Estimated body mass index is 29.18 kg/m  as calculated from the following:    Height as of this encounter: 1.626 m (5' 4\").    Weight as of this encounter: 77.1 kg (170 lb).       Diet: Regular Diet Adult    DVT Prophylaxis: Lovenox  Lopez Catheter: Not present  Central Lines: PRESENT  CVC Double Lumen Left Femoral Non - tunneled-Site Assessment: WDL  Cardiac Monitoring: ACTIVE order. Indication: Chest pain/ ACS rule out (24 hours)  Code Status: Full Code      Disposition Plan     Expected Discharge Date: 09/12/2022      Destination: home with family  Discharge Comments: pending improvement  Unknown  The patient's care was discussed with the Bedside Nurse, Patient and Hematology Consultant.    ARELY CASTAÑEDA MD  Hospitalist Service, [address]  Securely message with the Vocera Web Console (learn more here)  Text page via [facility] Paging/Directory   Please see signed in provider for up to date coverage information      Clinically Significant Risk Factors Present on Admission                 "     ______________________________________________________________________    Interval History   Again encouraged IS use. Pt says she is in pain and added Toradol yesterday. Will check the response. Will try to avoid Dilaudid as pt is improving regarding Oxygenation.     Data reviewed today: I reviewed all medications, new labs and imaging results over the last 24 hours.     Physical Exam   Vital Signs: Temp: 98.1  F (36.7  C) Temp src: Oral BP: 121/64 Pulse: 111   Resp: 16 SpO2: 93 % O2 Device: Nasal cannula Oxygen Delivery: 1.5 LPM  Weight: 185 lbs 6.51 oz  Constitutional: Mild distress lying in bed  Respiratory: Normal RR, No wheezez or crackles     Data

## 2022-09-11 LAB
ALBUMIN SERPL BCG-MCNC: 3 G/DL (ref 3.5–5.2)
ALBUMIN UR-MCNC: NEGATIVE MG/DL
ALP SERPL-CCNC: 100 U/L (ref 35–104)
ALT SERPL W P-5'-P-CCNC: 34 U/L (ref 10–35)
ANION GAP SERPL CALCULATED.3IONS-SCNC: 9 MMOL/L (ref 7–15)
APPEARANCE UR: CLEAR
AST SERPL W P-5'-P-CCNC: 47 U/L (ref 10–35)
BACTERIA BLD CULT: NO GROWTH
BILIRUB SERPL-MCNC: 1.9 MG/DL
BILIRUB UR QL STRIP: NEGATIVE
BUN SERPL-MCNC: 4.1 MG/DL (ref 6–20)
CALCIUM SERPL-MCNC: 8.1 MG/DL (ref 8.6–10)
CHLORIDE SERPL-SCNC: 105 MMOL/L (ref 98–107)
COLOR UR AUTO: ABNORMAL
CREAT SERPL-MCNC: 0.52 MG/DL (ref 0.51–0.95)
DEPRECATED HCO3 PLAS-SCNC: 25 MMOL/L (ref 22–29)
ERYTHROCYTE [DISTWIDTH] IN BLOOD BY AUTOMATED COUNT: 19.2 % (ref 10–15)
GFR SERPL CREATININE-BSD FRML MDRD: >90 ML/MIN/1.73M2
GLUCOSE SERPL-MCNC: 89 MG/DL (ref 70–99)
GLUCOSE UR STRIP-MCNC: NEGATIVE MG/DL
HCT VFR BLD AUTO: 21 % (ref 35–47)
HGB BLD-MCNC: 6.6 G/DL (ref 11.7–15.7)
HGB UR QL STRIP: NEGATIVE
KETONES UR STRIP-MCNC: NEGATIVE MG/DL
LACTATE SERPL-SCNC: 1.1 MMOL/L (ref 0.7–2)
LEUKOCYTE ESTERASE UR QL STRIP: ABNORMAL
MCH RBC QN AUTO: 28.3 PG (ref 26.5–33)
MCHC RBC AUTO-ENTMCNC: 31.4 G/DL (ref 31.5–36.5)
MCV RBC AUTO: 90 FL (ref 78–100)
NITRATE UR QL: NEGATIVE
PH UR STRIP: 7 [PH] (ref 5–7)
PLATELET # BLD AUTO: 281 10E3/UL (ref 150–450)
POTASSIUM SERPL-SCNC: 3.3 MMOL/L (ref 3.4–5.3)
POTASSIUM SERPL-SCNC: 3.4 MMOL/L (ref 3.4–5.3)
PROT SERPL-MCNC: 5.7 G/DL (ref 6.4–8.3)
RBC # BLD AUTO: 2.33 10E6/UL (ref 3.8–5.2)
RBC URINE: 0 /HPF
SODIUM SERPL-SCNC: 139 MMOL/L (ref 136–145)
SP GR UR STRIP: 1.01 (ref 1–1.03)
SQUAMOUS EPITHELIAL: 1 /HPF
TRANSITIONAL EPI: <1 /HPF
UROBILINOGEN UR STRIP-MCNC: 2 MG/DL
WBC # BLD AUTO: 12.1 10E3/UL (ref 4–11)
WBC URINE: 0 /HPF

## 2022-09-11 PROCEDURE — 80053 COMPREHEN METABOLIC PANEL: CPT | Performed by: STUDENT IN AN ORGANIZED HEALTH CARE EDUCATION/TRAINING PROGRAM

## 2022-09-11 PROCEDURE — 85027 COMPLETE CBC AUTOMATED: CPT | Performed by: STUDENT IN AN ORGANIZED HEALTH CARE EDUCATION/TRAINING PROGRAM

## 2022-09-11 PROCEDURE — 120N000002 HC R&B MED SURG/OB UMMC

## 2022-09-11 PROCEDURE — 250N000009 HC RX 250: Performed by: STUDENT IN AN ORGANIZED HEALTH CARE EDUCATION/TRAINING PROGRAM

## 2022-09-11 PROCEDURE — 250N000013 HC RX MED GY IP 250 OP 250 PS 637: Performed by: STUDENT IN AN ORGANIZED HEALTH CARE EDUCATION/TRAINING PROGRAM

## 2022-09-11 PROCEDURE — 99233 SBSQ HOSP IP/OBS HIGH 50: CPT | Performed by: STUDENT IN AN ORGANIZED HEALTH CARE EDUCATION/TRAINING PROGRAM

## 2022-09-11 PROCEDURE — 36592 COLLECT BLOOD FROM PICC: CPT | Performed by: STUDENT IN AN ORGANIZED HEALTH CARE EDUCATION/TRAINING PROGRAM

## 2022-09-11 PROCEDURE — 258N000003 HC RX IP 258 OP 636: Performed by: STUDENT IN AN ORGANIZED HEALTH CARE EDUCATION/TRAINING PROGRAM

## 2022-09-11 PROCEDURE — 87040 BLOOD CULTURE FOR BACTERIA: CPT | Performed by: INTERNAL MEDICINE

## 2022-09-11 PROCEDURE — 84132 ASSAY OF SERUM POTASSIUM: CPT | Performed by: STUDENT IN AN ORGANIZED HEALTH CARE EDUCATION/TRAINING PROGRAM

## 2022-09-11 PROCEDURE — 36592 COLLECT BLOOD FROM PICC: CPT | Performed by: INTERNAL MEDICINE

## 2022-09-11 PROCEDURE — 81001 URINALYSIS AUTO W/SCOPE: CPT | Performed by: INTERNAL MEDICINE

## 2022-09-11 PROCEDURE — 250N000011 HC RX IP 250 OP 636: Performed by: STUDENT IN AN ORGANIZED HEALTH CARE EDUCATION/TRAINING PROGRAM

## 2022-09-11 RX ORDER — POTASSIUM CHLORIDE 29.8 MG/ML
20 INJECTION INTRAVENOUS
Status: DISPENSED | OUTPATIENT
Start: 2022-09-11 | End: 2022-09-11

## 2022-09-11 RX ORDER — POTASSIUM CHLORIDE 29.8 MG/ML
20 INJECTION INTRAVENOUS
Status: COMPLETED | OUTPATIENT
Start: 2022-09-11 | End: 2022-09-11

## 2022-09-11 RX ADMIN — POTASSIUM CHLORIDE 20 MEQ: 29.8 INJECTION, SOLUTION INTRAVENOUS at 15:34

## 2022-09-11 RX ADMIN — OXYCODONE HYDROCHLORIDE 20 MG: 10 TABLET ORAL at 13:15

## 2022-09-11 RX ADMIN — OXYCODONE HYDROCHLORIDE 20 MG: 10 TABLET ORAL at 18:47

## 2022-09-11 RX ADMIN — METHOCARBAMOL 500 MG: 500 TABLET, FILM COATED ORAL at 18:47

## 2022-09-11 RX ADMIN — POLYETHYLENE GLYCOL 3350 17 G: 17 POWDER, FOR SOLUTION ORAL at 07:48

## 2022-09-11 RX ADMIN — METHOCARBAMOL 500 MG: 500 TABLET, FILM COATED ORAL at 07:48

## 2022-09-11 RX ADMIN — ACETAMINOPHEN 975 MG: 325 TABLET, FILM COATED ORAL at 07:13

## 2022-09-11 RX ADMIN — POTASSIUM CHLORIDE 20 MEQ: 29.8 INJECTION, SOLUTION INTRAVENOUS at 14:21

## 2022-09-11 RX ADMIN — HYDROXYUREA 3000 MG: 500 CAPSULE ORAL at 07:48

## 2022-09-11 RX ADMIN — ENOXAPARIN SODIUM 40 MG: 40 INJECTION SUBCUTANEOUS at 15:48

## 2022-09-11 RX ADMIN — ASPIRIN 81 MG CHEWABLE TABLET 81 MG: 81 TABLET CHEWABLE at 07:48

## 2022-09-11 RX ADMIN — ASPIRIN 81 MG CHEWABLE TABLET 81 MG: 81 TABLET CHEWABLE at 20:52

## 2022-09-11 RX ADMIN — KETOROLAC TROMETHAMINE 30 MG: 30 INJECTION, SOLUTION INTRAMUSCULAR at 07:48

## 2022-09-11 RX ADMIN — VANCOMYCIN HYDROCHLORIDE 1250 MG: 10 INJECTION, POWDER, LYOPHILIZED, FOR SOLUTION INTRAVENOUS at 17:47

## 2022-09-11 RX ADMIN — FLUTICASONE FUROATE AND VILANTEROL TRIFENATATE 1 PUFF: 200; 25 POWDER RESPIRATORY (INHALATION) at 13:19

## 2022-09-11 RX ADMIN — METHOCARBAMOL 500 MG: 500 TABLET, FILM COATED ORAL at 13:15

## 2022-09-11 RX ADMIN — VANCOMYCIN HYDROCHLORIDE 1250 MG: 10 INJECTION, POWDER, LYOPHILIZED, FOR SOLUTION INTRAVENOUS at 09:45

## 2022-09-11 RX ADMIN — KETOROLAC TROMETHAMINE 30 MG: 30 INJECTION, SOLUTION INTRAMUSCULAR at 16:49

## 2022-09-11 RX ADMIN — CEFTRIAXONE SODIUM 2 G: 2 INJECTION, POWDER, FOR SOLUTION INTRAMUSCULAR; INTRAVENOUS at 16:57

## 2022-09-11 RX ADMIN — OXYCODONE HYDROCHLORIDE 20 MG: 10 TABLET ORAL at 07:48

## 2022-09-11 RX ADMIN — KETAMINE HYDROCHLORIDE 4 MG/HR: 100 INJECTION, SOLUTION, CONCENTRATE INTRAMUSCULAR; INTRAVENOUS at 11:20

## 2022-09-11 RX ADMIN — VANCOMYCIN HYDROCHLORIDE 1250 MG: 10 INJECTION, POWDER, LYOPHILIZED, FOR SOLUTION INTRAVENOUS at 01:55

## 2022-09-11 RX ADMIN — SENNOSIDES AND DOCUSATE SODIUM 2 TABLET: 50; 8.6 TABLET ORAL at 20:52

## 2022-09-11 RX ADMIN — SENNOSIDES AND DOCUSATE SODIUM 2 TABLET: 50; 8.6 TABLET ORAL at 07:48

## 2022-09-11 ASSESSMENT — ACTIVITIES OF DAILY LIVING (ADL)
ADLS_ACUITY_SCORE: 29

## 2022-09-11 NOTE — PROGRESS NOTES
[facility]    Medicine Progress Note - Hospitalist Service, GOLD TEAM 8    Date of Admission:  9/5/2022    Assessment & Plan            22 year old female admitted on 9/5/2022. She has a past medical history of sickle cell disease, CVA, PE, and cognitive delay who presents with shortness of breath and chest pain with Acute chest pain syndrome with concern for PNA on IV Abx due to O2 requirement which also could be due to over sedation. PE excluded and off AC.     Update:  - Fever overnight but now afebrile. Cx are neg so far. Will discontinue Abx tomorrow 09/12  - Will see the need for femoral line and if not needed will discontinue. Pt is doing much better daily  - Reducing Ketamine to 4 and planning to discontinue tomorrow and transition to oral home meds        # Acute chest syndrome  # Leukocytosis (neutrophil predominant)  # Acute hypoxic respiratory syndrome: Improving  CXR at admission with lower lung volumes but new opacity in both lung bases (atelectasis versus infiltrate possible).  Negative COVID.  Hematology has seen and given leukocytosis, elevated procal, and CXR findings concerning for acute chest.  Procal mildly elevated, negative troponin, and lactate not elevated.  Given Zosyn in the ED, Got 2 units of blood also 09/06. Strep PNA neg, legionella neg and Resp panel neg. MRSA nares positive. CT PE neg and prophylactic AC stopped but some concern for PNA. TTE neg for Pul HTN    -Consult hematology, appreciate recs. S/p 2 units of simple transfusion and Exchange transfusion 09/07  -Start ceftriaxone and azithromycin SOT 09/05 Added Vanc due to fever and MRSA nares being positive and increased O2 requirement. EOT for CTX and Vanc 09/12  - Blood Cx 1/2 MRSE likely contaminant. Repeat neg  - Encourage incentive spirometry  - Pain control with Oxycodone 20 q4h, Ketamine at 4mg/h. IV dilaudid discontinued due to concern for over sedation. Ketorolac BID PRN added  "instead. Planning to discontinue ketamine tomorrow and transition to home meds.   - Monitor CBC w/ diff, retic count, bilirubin in AM              - Do not transfuse blood unless discussed with Hematology first. Do not transfuse based solely off hemoglobin level given risk of iron overload and risk of developing RBC antibodies.   - Monitor O2 sats and vitals closely  - Telemetry  - Hydrea and deferasirox per hematology  - Regular diet. Pt is eating and drinking      # Sickle cell anemia  # History of CVA with RUE hemiparesis  # Sickle Cell Pain Crisis  # Back pain  # Hx of PE  Presented earlier in the day with sickle cell pain crisis (back pain) and was discharged after treatment with Toradol, ketamine, LR, and oxycodone. Subsequently developed chest pain and SOB prior to returning to ED. Of note she has prior treatment plan in chart.  Discussed continuing home regimen for the time being and patient in agreement.  - Pain tx as above   -Zofran as needed  -Continue ASA  -Senna PRN, pending stool output may need to schedule     # Asthma  Continue PTA Symbicort (Breo Ellipta sub per pharm) and albuterol      # Constipation:  - Bowel regimen scheduled     # Hyponatremia: Present on admission Resolved with fluids    # Overweight: Estimated body mass index is 29.18 kg/m  as calculated from the following:    Height as of this encounter: 1.626 m (5' 4\").    Weight as of this encounter: 77.1 kg (170 lb).       Diet: Regular Diet Adult    DVT Prophylaxis: Lovenox  Lopez Catheter: Not present  Central Lines: PRESENT  CVC Double Lumen Left Femoral Non - tunneled-Site Assessment: WDL  Cardiac Monitoring: ACTIVE order. Indication: Chest pain/ ACS rule out (24 hours)  Code Status: Full Code      Disposition Plan     Expected Discharge Date: 09/13/2022      Destination: home with family  Discharge Comments: pending improvement  Unknown  The patient's care was discussed with the Bedside Nurse, Patient and Hematology " Consultant.    ARELY CASTAÑEDA MD  Hospitalist Service, GOLD TEAM 8  [facility]  Securely message with the [employer] Web Console (learn more here)  Text page via [facility] Paging/Directory   Please see signed in provider for up to date coverage information      Clinically Significant Risk Factors Present on Admission                     ______________________________________________________________________    Interval History   Pt is more awake and feeling better. Pain is ok and ok with reducing Ketamine today. No SOB or abdominal pain    Data reviewed today: I reviewed all medications, new labs and imaging results over the last 24 hours.     Physical Exam   Vital Signs: Temp: (!) 101  F (38.3  C) Temp src: Oral BP: 134/67 Pulse: 113   Resp: 20 SpO2: 96 % O2 Device: Nasal cannula Oxygen Delivery: 1 LPM  Weight: 189 lbs 3.2 oz  Constitutional: Mild distress lying in bed  Respiratory: Normal RR, No wheezez or crackles     Data

## 2022-09-11 NOTE — PLAN OF CARE
Goal Outcome Evaluation:    Plan of Care Reviewed With: patient     Overall Patient Progress: no change    Outcome Evaluation: A/Ox4. VSS on 1L NC, ex tachycardic. Pt spiked temp of 101.0, tylenol given; MD notified. On ketamine gtt, also utilizing oxycodone, toradol and robaxin for back pain. Triggered sepsis again last night, lactate was 1.1. Up with sba to commode, pt has urinary urgency. Can be incontinent. No BM yet.

## 2022-09-11 NOTE — PLAN OF CARE
Goal Outcome Evaluation:    Plan of Care Reviewed With: patient     Overall Patient Progress: no change    Outcome Evaluation: A/Ox4, weaned to RA, afebrile, intermittently tachycardic even at rest. Tele reading NSR to sinus tach this AM, discontinued this afternoon. Ketamine gtt decreased from 6 mg to 4 mg/hr. Patient utilizing ketorolac, robaxin, and nonpharmacological pain management interventions. Patient reports pain as moderate on most assessment. Patient declined OOB activity due to pain and fatigue. Patient staying hydrated, but poor to fair intake with meals. Writer investigated this and patient does not like food here, states her family and friends will not be visiting and can't bring any food in. Patient declined ordering food from outside restuarants. UA sent to lab. No BM since 9/6 patient strongly declined miralax despite education but accepting of senna. Patient states that she has constipation on most hospitalizations. Abdominal massage and fluids, exercise promoted. Fem line dressing changed today, third day needing dressing change given patient's urinary urgency incontinence. Bcx drawn. RUE US ordered to r/o DVT, pending completion. Abrasion/blister found 9/11 under tele sticker site nearest to port, no skin concerns beneath other telemetry stickers, team sent FYI page. Patient also inquired about receiving her Depo birth control shot while inpatient, team informed and plan to hold off until after discharge.

## 2022-09-12 ENCOUNTER — APPOINTMENT (OUTPATIENT)
Dept: ULTRASOUND IMAGING | Facility: CLINIC | Age: 22
DRG: 811 | End: 2022-09-12
Attending: STUDENT IN AN ORGANIZED HEALTH CARE EDUCATION/TRAINING PROGRAM
Payer: COMMERCIAL

## 2022-09-12 LAB
ALBUMIN SERPL BCG-MCNC: 3 G/DL (ref 3.5–5.2)
ALP SERPL-CCNC: 102 U/L (ref 35–104)
ALT SERPL W P-5'-P-CCNC: 35 U/L (ref 10–35)
ANION GAP SERPL CALCULATED.3IONS-SCNC: 9 MMOL/L (ref 7–15)
AST SERPL W P-5'-P-CCNC: 46 U/L (ref 10–35)
BACTERIA BLD CULT: NO GROWTH
BILIRUB SERPL-MCNC: 1.2 MG/DL
BUN SERPL-MCNC: 4.6 MG/DL (ref 6–20)
CALCIUM SERPL-MCNC: 8.3 MG/DL (ref 8.6–10)
CHLORIDE SERPL-SCNC: 102 MMOL/L (ref 98–107)
CREAT SERPL-MCNC: 0.59 MG/DL (ref 0.51–0.95)
DEPRECATED HCO3 PLAS-SCNC: 26 MMOL/L (ref 22–29)
ERYTHROCYTE [DISTWIDTH] IN BLOOD BY AUTOMATED COUNT: 19.5 % (ref 10–15)
GFR SERPL CREATININE-BSD FRML MDRD: >90 ML/MIN/1.73M2
GLUCOSE SERPL-MCNC: 76 MG/DL (ref 70–99)
HCT VFR BLD AUTO: 22.9 % (ref 35–47)
HGB BLD-MCNC: 7.5 G/DL (ref 11.7–15.7)
HOLD SPECIMEN: NORMAL
LACTATE SERPL-SCNC: 1.1 MMOL/L (ref 0.7–2)
MCH RBC QN AUTO: 29.2 PG (ref 26.5–33)
MCHC RBC AUTO-ENTMCNC: 32.8 G/DL (ref 31.5–36.5)
MCV RBC AUTO: 89 FL (ref 78–100)
PLATELET # BLD AUTO: 360 10E3/UL (ref 150–450)
POTASSIUM SERPL-SCNC: 3.5 MMOL/L (ref 3.4–5.3)
POTASSIUM SERPL-SCNC: 3.5 MMOL/L (ref 3.4–5.3)
PROT SERPL-MCNC: 6.1 G/DL (ref 6.4–8.3)
RADIOLOGIST FLAGS: NORMAL
RBC # BLD AUTO: 2.57 10E6/UL (ref 3.8–5.2)
SODIUM SERPL-SCNC: 137 MMOL/L (ref 136–145)
WBC # BLD AUTO: 12.2 10E3/UL (ref 4–11)

## 2022-09-12 PROCEDURE — 83605 ASSAY OF LACTIC ACID: CPT | Performed by: STUDENT IN AN ORGANIZED HEALTH CARE EDUCATION/TRAINING PROGRAM

## 2022-09-12 PROCEDURE — 99232 SBSQ HOSP IP/OBS MODERATE 35: CPT | Performed by: STUDENT IN AN ORGANIZED HEALTH CARE EDUCATION/TRAINING PROGRAM

## 2022-09-12 PROCEDURE — 84132 ASSAY OF SERUM POTASSIUM: CPT | Performed by: STUDENT IN AN ORGANIZED HEALTH CARE EDUCATION/TRAINING PROGRAM

## 2022-09-12 PROCEDURE — 93971 EXTREMITY STUDY: CPT | Mod: RT

## 2022-09-12 PROCEDURE — 85027 COMPLETE CBC AUTOMATED: CPT | Performed by: STUDENT IN AN ORGANIZED HEALTH CARE EDUCATION/TRAINING PROGRAM

## 2022-09-12 PROCEDURE — 80053 COMPREHEN METABOLIC PANEL: CPT | Performed by: STUDENT IN AN ORGANIZED HEALTH CARE EDUCATION/TRAINING PROGRAM

## 2022-09-12 PROCEDURE — 250N000013 HC RX MED GY IP 250 OP 250 PS 637: Performed by: STUDENT IN AN ORGANIZED HEALTH CARE EDUCATION/TRAINING PROGRAM

## 2022-09-12 PROCEDURE — 93971 EXTREMITY STUDY: CPT | Mod: 26 | Performed by: STUDENT IN AN ORGANIZED HEALTH CARE EDUCATION/TRAINING PROGRAM

## 2022-09-12 PROCEDURE — 36592 COLLECT BLOOD FROM PICC: CPT | Performed by: STUDENT IN AN ORGANIZED HEALTH CARE EDUCATION/TRAINING PROGRAM

## 2022-09-12 PROCEDURE — 250N000011 HC RX IP 250 OP 636: Performed by: STUDENT IN AN ORGANIZED HEALTH CARE EDUCATION/TRAINING PROGRAM

## 2022-09-12 PROCEDURE — 258N000003 HC RX IP 258 OP 636: Performed by: STUDENT IN AN ORGANIZED HEALTH CARE EDUCATION/TRAINING PROGRAM

## 2022-09-12 PROCEDURE — 120N000002 HC R&B MED SURG/OB UMMC

## 2022-09-12 RX ORDER — HYDROMORPHONE HYDROCHLORIDE 1 MG/ML
0.5 INJECTION, SOLUTION INTRAMUSCULAR; INTRAVENOUS; SUBCUTANEOUS ONCE
Status: COMPLETED | OUTPATIENT
Start: 2022-09-12 | End: 2022-09-12

## 2022-09-12 RX ORDER — HEPARIN SODIUM,PORCINE 10 UNIT/ML
5-10 VIAL (ML) INTRAVENOUS
Status: DISCONTINUED | OUTPATIENT
Start: 2022-09-12 | End: 2022-09-13 | Stop reason: HOSPADM

## 2022-09-12 RX ORDER — HEPARIN SODIUM,PORCINE 10 UNIT/ML
5-10 VIAL (ML) INTRAVENOUS EVERY 24 HOURS
Status: DISCONTINUED | OUTPATIENT
Start: 2022-09-12 | End: 2022-09-13 | Stop reason: HOSPADM

## 2022-09-12 RX ORDER — POTASSIUM CHLORIDE 750 MG/1
40 TABLET, EXTENDED RELEASE ORAL ONCE
Status: COMPLETED | OUTPATIENT
Start: 2022-09-12 | End: 2022-09-12

## 2022-09-12 RX ORDER — HEPARIN SODIUM (PORCINE) LOCK FLUSH IV SOLN 100 UNIT/ML 100 UNIT/ML
5-10 SOLUTION INTRAVENOUS
Status: DISCONTINUED | OUTPATIENT
Start: 2022-09-13 | End: 2022-09-13 | Stop reason: HOSPADM

## 2022-09-12 RX ADMIN — OXYCODONE HYDROCHLORIDE 20 MG: 10 TABLET ORAL at 00:24

## 2022-09-12 RX ADMIN — SENNOSIDES AND DOCUSATE SODIUM 2 TABLET: 50; 8.6 TABLET ORAL at 19:16

## 2022-09-12 RX ADMIN — HYDROMORPHONE HYDROCHLORIDE 0.5 MG: 1 INJECTION, SOLUTION INTRAMUSCULAR; INTRAVENOUS; SUBCUTANEOUS at 22:52

## 2022-09-12 RX ADMIN — KETOROLAC TROMETHAMINE 30 MG: 30 INJECTION, SOLUTION INTRAMUSCULAR at 19:10

## 2022-09-12 RX ADMIN — HYDROXYUREA 3000 MG: 500 CAPSULE ORAL at 09:49

## 2022-09-12 RX ADMIN — POTASSIUM CHLORIDE 40 MEQ: 750 TABLET, EXTENDED RELEASE ORAL at 02:07

## 2022-09-12 RX ADMIN — OXYCODONE HYDROCHLORIDE 20 MG: 10 TABLET ORAL at 20:54

## 2022-09-12 RX ADMIN — VANCOMYCIN HYDROCHLORIDE 1250 MG: 10 INJECTION, POWDER, LYOPHILIZED, FOR SOLUTION INTRAVENOUS at 02:07

## 2022-09-12 RX ADMIN — KETOROLAC TROMETHAMINE 30 MG: 30 INJECTION, SOLUTION INTRAMUSCULAR at 14:09

## 2022-09-12 RX ADMIN — ASPIRIN 81 MG CHEWABLE TABLET 81 MG: 81 TABLET CHEWABLE at 09:49

## 2022-09-12 RX ADMIN — SENNOSIDES AND DOCUSATE SODIUM 2 TABLET: 50; 8.6 TABLET ORAL at 09:49

## 2022-09-12 RX ADMIN — ENOXAPARIN SODIUM 40 MG: 40 INJECTION SUBCUTANEOUS at 16:33

## 2022-09-12 RX ADMIN — ASPIRIN 81 MG CHEWABLE TABLET 81 MG: 81 TABLET CHEWABLE at 19:18

## 2022-09-12 RX ADMIN — KETOROLAC TROMETHAMINE 30 MG: 30 INJECTION, SOLUTION INTRAMUSCULAR at 00:24

## 2022-09-12 ASSESSMENT — ACTIVITIES OF DAILY LIVING (ADL)
ADLS_ACUITY_SCORE: 29

## 2022-09-12 NOTE — PLAN OF CARE
Goal Outcome Evaluation:    Plan of Care Reviewed With: patient     Overall Patient Progress: improving    Outcome Evaluation: A/Ox4. VSS ex tachycardic on RA. Ketamine gtt running, also utilizing oxycodone and toradol. Pt expresses improvement with her pain. Scheduled toileting initiated, up to commode with SBA. Triggered sepsis again tonight, lactate was 1.1.

## 2022-09-12 NOTE — PLAN OF CARE
Goal Outcome Evaluation:    Plan of Care Reviewed With: patient     Overall Patient Progress: no change    Outcome Evaluation: A&O x4. Vitals are stable on RA except tachy. Ketamine drip dicontinued. Complains are back pain. PRN IV toradol administered with relief. Up to commode x3 this shift. Flat affect, slept most of the shift. Pt had right arm imaging completed today.

## 2022-09-12 NOTE — PROVIDER NOTIFICATION
"Imaging called for \"ultrasound extremity\", and wanted MD to look at impression #2. MD notified.  "

## 2022-09-12 NOTE — PROGRESS NOTES
[facility]    Medicine Progress Note - Hospitalist Service, GOLD TEAM 8    Date of Admission:  9/5/2022    Assessment & Plan            22 year old female admitted on 9/5/2022. She has a past medical history of sickle cell disease, CVA, PE, and cognitive delay who presents with shortness of breath and chest pain with Acute chest pain syndrome s/p exchange transfusion with concern for PNA finished IV Abx. Now on RA with pain improving.    Update:  - Abx discontinued  - Ketamine Discontinued  - Will talk to heme to make sure there will be no further recs and discontinue Femoral line     # Acute chest syndrome  # Leukocytosis (neutrophil predominant)  # Acute hypoxic respiratory syndrome: Resolved  CXR at admission with lower lung volumes but new opacity in both lung bases (atelectasis versus infiltrate possible).  Negative COVID.  Hematology has seen and given leukocytosis, elevated procal, and CXR findings concerning for acute chest.  Procal mildly elevated, negative troponin, and lactate not elevated.  Given Zosyn in the ED, Got 2 units of blood also 09/06. Strep PNA neg, legionella neg and Resp panel neg. MRSA nares positive. CT PE neg and prophylactic AC stopped but some concern for PNA. TTE neg for Pul HTN    -Consult hematology, appreciate recs. S/p 2 units of simple transfusion and Exchange transfusion 09/07  - Finished 7 days of IV broad spectrum Abx with CTX, Azithro and Vanc.   - Blood Cx 1/2 MRSE likely contaminant. Repeat neg  - Encourage incentive spirometry  - Pain control with Oxycodone 20 q4h,  Ketorolac BID PRN added. Will transition to home dose tomorrow pending imprvement   - Monitor CBC w/ diff, retic count, bilirubin in AM              - Do not transfuse blood unless discussed with Hematology first. Do not transfuse based solely off hemoglobin level given risk of iron overload and risk of developing RBC antibodies.   - Monitor O2 sats and vitals  "closely  - Telemetry  - Hydrea and deferasirox per hematology  - Regular diet. Pt is eating and drinking      # Sickle cell anemia  # History of CVA with RUE hemiparesis  # Sickle Cell Pain Crisis  # Back pain  # Hx of PE  Presented earlier in the day with sickle cell pain crisis (back pain) and was discharged after treatment with Toradol, ketamine, LR, and oxycodone. Subsequently developed chest pain and SOB prior to returning to ED. Of note she has prior treatment plan in chart.  Discussed continuing home regimen for the time being and patient in agreement.  - Pain tx as above   -Zofran as needed  -Continue ASA  -Senna PRN     # Asthma  Continue PTA Symbicort (Breo Ellipta sub per pharm) and albuterol    # Indeterminate cystic lesion inferior to clavicle with concern for  surrounding lymphadenopathy.    - This is of unknown clinical significance and dates back to at least 4/20/2022 by ultrasound.  Dedicated cross sectional imaging (CT or MRI soft tissue neck) for further assessment is advised. To be followed by PCP      # Constipation:  - Bowel regimen scheduled     # Hyponatremia: Present on admission Resolved with fluids    # Overweight: Estimated body mass index is 29.18 kg/m  as calculated from the following:    Height as of this encounter: 1.626 m (5' 4\").    Weight as of this encounter: 77.1 kg (170 lb).       Diet: Regular Diet Adult    DVT Prophylaxis: Lovenox  Lopez Catheter: Not present  Central Lines: PRESENT  CVC Double Lumen Left Femoral Non - tunneled-Site Assessment: WDL  Cardiac Monitoring: None  Code Status: Full Code      Disposition Plan     Expected Discharge Date: 09/13/2022      Destination: home with family  Discharge Comments: Once she is off IV pain meds can discharge home. Likely in 1-2 day  Dispo: home  Unknown  The patient's care was discussed with the Bedside Nurse, Patient and Hematology Consultant.    ARELY CASTAÑEDA MD  Hospitalist Service, GOLD TEAM 8  [facility]  Securely message with the [employer] Web Console (learn more here)  Text page via [facility] Paging/Directory   Please see signed in provider for up to date coverage information      Clinically Significant Risk Factors Present on Admission                     ______________________________________________________________________    Interval History   Pt is more awake and feeling better. Pain is ok and pt is ok with discontinuing Ketamine. Will continue pain regimen above. Follow up on Bowel movements.     Data reviewed today: I reviewed all medications, new labs and imaging results over the last 24 hours.     Physical Exam   Vital Signs: Temp: 97.4  F (36.3  C) Temp src: Axillary BP: 126/73 Pulse: 99   Resp: 20 SpO2: 96 % O2 Device: None (Room air) Oxygen Delivery: 1 LPM  Weight: 183 lbs 8 oz  Constitutional: Mild distress lying in bed  Respiratory: Normal RR, No wheezez or crackles     Data

## 2022-09-13 ENCOUNTER — HOME INFUSION (PRE-WILLOW HOME INFUSION) (OUTPATIENT)
Dept: PHARMACY | Facility: CLINIC | Age: 22
End: 2022-09-13

## 2022-09-13 VITALS
TEMPERATURE: 97.8 F | HEART RATE: 104 BPM | BODY MASS INDEX: 31.33 KG/M2 | DIASTOLIC BLOOD PRESSURE: 62 MMHG | SYSTOLIC BLOOD PRESSURE: 132 MMHG | RESPIRATION RATE: 18 BRPM | HEIGHT: 64 IN | OXYGEN SATURATION: 98 % | WEIGHT: 183.5 LBS

## 2022-09-13 LAB
HOLD SPECIMEN: NORMAL
LACTATE SERPL-SCNC: 0.7 MMOL/L (ref 0.7–2)
POTASSIUM SERPL-SCNC: 3.6 MMOL/L (ref 3.4–5.3)

## 2022-09-13 PROCEDURE — 36592 COLLECT BLOOD FROM PICC: CPT | Performed by: STUDENT IN AN ORGANIZED HEALTH CARE EDUCATION/TRAINING PROGRAM

## 2022-09-13 PROCEDURE — 250N000013 HC RX MED GY IP 250 OP 250 PS 637: Performed by: STUDENT IN AN ORGANIZED HEALTH CARE EDUCATION/TRAINING PROGRAM

## 2022-09-13 PROCEDURE — 250N000011 HC RX IP 250 OP 636: Performed by: STUDENT IN AN ORGANIZED HEALTH CARE EDUCATION/TRAINING PROGRAM

## 2022-09-13 PROCEDURE — 83605 ASSAY OF LACTIC ACID: CPT | Performed by: STUDENT IN AN ORGANIZED HEALTH CARE EDUCATION/TRAINING PROGRAM

## 2022-09-13 PROCEDURE — 99239 HOSP IP/OBS DSCHRG MGMT >30: CPT | Performed by: PEDIATRICS

## 2022-09-13 PROCEDURE — 84132 ASSAY OF SERUM POTASSIUM: CPT | Performed by: STUDENT IN AN ORGANIZED HEALTH CARE EDUCATION/TRAINING PROGRAM

## 2022-09-13 RX ADMIN — Medication 5 ML: at 14:56

## 2022-09-13 RX ADMIN — HYDROXYUREA 3000 MG: 500 CAPSULE ORAL at 09:06

## 2022-09-13 RX ADMIN — Medication 5 ML: at 05:41

## 2022-09-13 RX ADMIN — ASPIRIN 81 MG CHEWABLE TABLET 81 MG: 81 TABLET CHEWABLE at 09:07

## 2022-09-13 ASSESSMENT — ACTIVITIES OF DAILY LIVING (ADL)
ADLS_ACUITY_SCORE: 29

## 2022-09-13 NOTE — PROGRESS NOTES
Care Management Discharge Note    Discharge Date: 09/13/2022     Discharge Disposition: Home w/family support and assistance     Discharge Services: [facility] (no orders are needed for this)    Discharge DME: None    Discharge Transportation: family or friend will provide or [facility] [phone] ([facility])    Private pay costs discussed: Not applicable    PAS Confirmation Code:  NA  Patient/family educated on Medicare website which has current facility and service quality ratings:  NA    Education Provided on the Discharge Plan:  NA, no RNCC dc needs identified    Internal Handoff Referral Completed: Yes  Dr Claros at [facility] [place]    Additional Information:  - Abx discontinued  - Ketamine Discontinued  - If no further recs from Heme femoral line will be dc'd  - no PT or OT rec's  - Patient is independent with most mobility/ADLs at home. Patients mom is her PCA, has good support system at home.      Jamaica Arita RN, BSN, PHN  Nurse Care Coordinator  Unit 5A, Rooms 5211-1 to 5219  Unit 5C, Rooms [id number]  [facility]   Desk phone & confidential voicemail: [phone]  Mon-Fri Pager: [phone]    To contact the On-call Weekend RNCC  [place] (0800 [phone] 1630) Saturday and Sunday    Units: 4A, 4C, 4E, 5A and 5B - Pager 1: [phone]    Units: 6A, 6B, 6C, and 6D - Pager 2: [phone]    Units: 7A, 7B, 7C, 7D, and 5C- Pager 3: [phone]    [facility] ([phone]) Saturday and Sunday    Units: 5 Ortho, 8A, 10 ICU, & Pediatric Units-Pager 4: [phone]

## 2022-09-13 NOTE — PLAN OF CARE
Goal Outcome Evaluation:    Plan of Care Reviewed With: patient     Overall Patient Progress: no change    Outcome Evaluation: A&Ox4. Vitals are stable on RA. Denies any pain, nausea or vomiting. Left femoral CVL was removed by provider today. No BM this shift. Voiding urgency continues, up to the commode x2. this shift. No appetite today. Does not like hospital food. Discharge today.

## 2022-09-13 NOTE — DISCHARGE SUMMARY
[facility]  Hospitalist Discharge Summary      Date of Admission:  9/5/2022  Date of Discharge:  9/13/2022  4:30 PM  Discharging Provider: AGNES REDDY MD  Discharge Service: Hospitalist Service, GOLD TEAM 8    Discharge Diagnoses   Sickle cell pain event   Pneumonia    Follow-ups Needed After Discharge   Follow-up Appointments     Adult [facility] Follow-up and recommended labs and tests      Follow up with primary care provider, Suraj Case, within 7 days   for hospital follow- up.  No follow up labs or test are needed.      Appointments on [place] and/or [facility] (with [facility] or [facility]   provider or service). Call [phone] if you haven't heard regarding   these appointments within 7 days of discharge.            PCP Follow-up:  [ ] Please follow up on cystic lesion under clavicle with possible lymphadenopathy     Unresulted Labs Ordered in the Past 30 Days of this Admission     Date and Time Order Name Status Description    9/11/2022  7:14 AM Blood Culture Hand, Left Preliminary     9/11/2022  7:14 AM Blood Culture Portacath Preliminary       These results will be followed up by pool    Discharge Disposition   Discharged to home  Condition at discharge: Stable    Hospital Course   Jennifer Cervantes is a 22 year old female with a history of hemoglobin sickle cell, CVA, PE, and cognitive delayadmitted on 9/5/2022 for a sickle cell pain event and acute chest syndrome.     # Sickle cell pain event and acute chest syndrome: She presented with acute hypoxic respiratory failure and chest pain. Given respiratory distress and infiltrate on CXR, there was concern for acute chest syndrome. PE was ruled out and imaging was suggestive of pneumonia or acute chest syndrome. She was started on Zosyn and received 2 units of pRBCs initially on presentation. However, hematology was consulted and recommended exchange transfusion on 9/7. Her symptoms improved with  "treatment, and she was able to be weaned to RA. She completed 7 days of antibiotics for pneumonia (Zosyn then transitioned to vanc/CTX/azithro). Pain was controlled with ketamine then transitioned to oxycodone and ketorolac. She was continued on hydroxyurea and deferasirox. Her home aspirin was continued given her history of CVA.        # Asthma: Continued PTA Symbicort (Breo Ellipta sub per pharm) and PRN albuterol.    # Indeterminate cystic lesion inferior to clavicle with concern for  surrounding lymphadenopathy.    - This is of unknown clinical significance and dates back to at least 4/20/2022 by ultrasound.  Dedicated cross sectional imaging (CT or MRI soft tissue neck) for further assessment is advised. To be followed by PCP.    # Hyponatremia: Present on admission resolved with IVF.     # Overweight: Estimated body mass index is 29.18 kg/m  as calculated from the following:    Height as of this encounter: 1.626 m (5' 4\").    Weight as of this encounter: 77.1 kg (170 lb).      Consultations This Hospital Stay   PHARMACY IP CONSULT  PHARMACY IP CONSULT  NURSING TO CONSULT FOR VASCULAR ACCESS CARE IP CONSULT  HEMATOLOGY ADULT IP CONSULT  PHARMACY TO DOSE VANCO  NURSING TO CONSULT FOR VASCULAR ACCESS CARE IP CONSULT  BLOOD BANK TRANSFUSION ADULT/PEDS IP CONSULT  BLOOD BANK TRANSFUSION ADULT/PEDS IP CONSULT  INTERVENTIONAL RADIOLOGY ADULT/PEDS IP CONSULT  NURSING TO CONSULT FOR VASCULAR ACCESS CARE IP CONSULT  CARE MANAGEMENT / SOCIAL WORK IP CONSULT  NURSING TO CONSULT FOR VASCULAR ACCESS CARE IP CONSULT  PHYSICAL THERAPY ADULT IP CONSULT  NURSING TO CONSULT FOR VASCULAR ACCESS CARE IP CONSULT  NURSING TO CONSULT FOR VASCULAR ACCESS CARE IP CONSULT    Code Status   Full Code    Time Spent on this Encounter   I, AGNES REDDY MD, personally saw the patient today and spent greater than 30 minutes discharging this patient.       AGNES REDDY MD  Formerly [facility] [address]  [place] [address]  Phone: [phone]  ______________________________________________________________________    Physical Exam   Vital Signs: Temp: 98.7  F (37.1  C) Temp src: Oral BP: 128/71 Pulse: 98   Resp: 18 SpO2: 97 % O2 Device: None (Room air)    Weight: 183 lbs 8 oz  Constitutional: awake, alert, cooperative, no apparent distress, and appears stated age  Eyes: pupils equal, round and reactive to light and extra-ocular muscles intact  ENT: normocepalic, without obvious abnormality, atramatic  Hematologic / Lymphatic: no cervical lymphadenopathy  Respiratory: No increased work of breathing, good air exchange, clear to auscultation bilaterally, no crackles or wheezing  Cardiovascular: regular rate and rhythm, normal S1 and S2 and murmurs include systolic murmur I/VI located at left upper sternal border without radiation  GI: normal bowel sounds, soft, non-distended and non-tender  Skin: normal skin color, texture, turgor  Neurologic: Awake, alert, known RUE hemiparesis, moving remainder of extremities symmetrically, known developmental delay        Primary Care Physician   Suraj Case    Discharge Orders      Primary Care - Care Coordination Referral      Reason for your hospital stay    You were hospitalized for a sickle cell pain event and pneumonia. Your pneumonia was treated with antibiotics.     Activity    Your activity upon discharge: activity as tolerated     Adult [facility] Follow-up and recommended labs and tests    Follow up with primary care provider, Suraj Case, within 7 days for hospital follow- up.  No follow up labs or test are needed.      Appointments on [place] and/or [facility] (with [facility] or [facility] provider or service). Call [phone] if you haven't heard regarding these appointments within 7 days of discharge.     Diet    Follow this diet upon discharge: Regular Diet Adult       Significant Results and Procedures   Results for orders placed or performed during the  hospital encounter of 09/05/22   XR Chest Port 1 View    Narrative    EXAM: XR CHEST PORT 1 VIEW  LOCATION: [facility]  DATE/TIME: 9/5/2022 11:00 PM    INDICATION: sob hypoxia  COMPARISON: 8/20/2022      Impression    IMPRESSION: Lung volumes are lower and there is new slight opacity at both lung bases which could relate to the lower lung volumes but developing bibasilar atelectasis/infiltrate possible. Upper lung zones remain clear. Heart size normal. Port-A-Cath   present   US Lower Extremity Venous Duplex Bilateral    Narrative    EXAMINATION: DOPPLER VENOUS ULTRASOUND OF BILATERAL LOWER EXTREMITIES,  9/5/2022 10:55 PM     COMPARISON: Left lower extremity venous ultrasound 12/5/2021,  bilateral lower extremity venous ultrasound 9/4/2021    HISTORY: Shortness of breath, hypoxia, concern for DVT    TECHNIQUE:  Gray-scale evaluation with compression, spectral flow and  color Doppler assessment of the deep venous system of both legs from  groin to knee, and then at the ankles.    FINDINGS:  In both lower extremities, the common femoral, femoral, popliteal and  posterior tibial veins demonstrate normal compressibility and blood  flow.      Impression    IMPRESSION:  No evidence of deep venous thrombosis in either lower  extremity.    I have personally reviewed the examination and initial interpretation  and I agree with the findings.    SHANNON NASCIMENTO MD         SYSTEM ID:  [id number]   IR CVC Non Tunnel Placement > 5 Yrs    Narrative    Procedure date: 9/7/2022.    History: Patient with history of sickle cell disease, admitted with  acute pain crisis, here for placement of non-tunneled central venous  catheter for apheresis. Patient reports acute pain on the right side  of her body. Prior nontunneled catheter placement was performed at  left femoral vein due to occluded right innominate vein.    Procedure: Image guided placement of left common femoral vein, 14  Liberian, 24  cm, dual-lumen nontunneled central venous Schon catheter.    Preop diagnosis: Sickle cell disease.    Postop diagnosis: Same.    : Robe Gonzalez PA-C    Assist: DENILSON Church.    Medications: 1% lidocaine 7 ml local anesthesia, heparin 3.0 cc, 100  units/ml.    Nursing: Throughout the procedure the patient's vital signs and oxygen  saturations were continuously monitored by IR nursing staff under my  supervision, and remained stable.    Fluoroscopy time: 1.5 minutes.    IV contrast: None.    Consent: Informed written and verbal consent obtained.    Procedure/Findings: The left inguinal region was prepped and draped in  the usual sterile fashion. Under ultrasound guidance a patent left  common femoral vein was identified, an image was saved, and the  overlying area was anesthetized with 1% lidocaine. Under ultrasound  guidance, a 0.018 gauge micropuncture needle was advanced into the  left common femoral vein. Under fluoroscopic visualization, a 0.018  soft tipped wire was advanced into the left iliac vein, the needle was  removed, and a 3-5 Japanese dilator was advanced over the wire into the  left common iliac vein.  The wire and inner 3 Japanese dilator were  removed. Under fluoroscopic guidance, a 0.035 Bentson wire was  advanced through the outer 5 Japanese dilator into the inferior vena  cava, and secured. A 14 Japanese, 24 cm, double lumen, non-tunneled  central venous Schon catheter was selected and flushed with normal  saline. The 5 Japanese dilator was removed, and attempted over-the-wire  dilation with a 14 Japanese Patrice dilator was unsuccessful. Patient  experienced significant pain during this attempt. The decision was  made to perform serial dilatation, and under fluoroscopic guidance,  over the wire serial dilatation was performed with 8, 10, 12, and 14  Japanese dilators.  The 14 Japanese dilator was removed and the 14 Japanese  nontunneled Schon catheter was then advanced over the wire into the  left  common femoral vein, and under fluoroscopic visualization, it's  tip was placed in the distal IVC/proximal left common iliac vein. The  catheter was secured with 2, 2-0 Ethilon sutures and the site was  cleansed and dressed. The catheter flushed and aspirated freely with  normal saline and each port was flushed with 2.0-2.0 ml of heparin 100  units/ml. The procedure was moderately tolerated, with complaint of  pain. No immediate complications.    Estimate blood loss: 3 cc.    Specimens: None.      Impression    Impression: Image-guided placement left common femoral vein, 14  Azeri, 24 cm, dual-lumen nontunneled central venous Schon catheter.  Catheter ready for immediate use.    Plan: Catheter ready for use. Daily dressing changes.       Attestation: The physician assistant (PA) who performed this procedure  and signed the above report is licensed to practice in the [facility] pursuant to [place] Statute 147A.09.  This includes meeting the  Statute and [place] Board of Medical [employer] requirement of an  active Delegation Agreement, which documents delegation of services by  primary and alternate supervising physicians. All services rendered  are performed under a collaborative agreement with Dr. Chava Mejias, Director of Interventional Radiology, [facility].    ALEJO SORIANO PA-C         SYSTEM ID:  [id number]   CT Chest Pulmonary Embolism w Contrast    Narrative    EXAMINATION: CTA pulmonary angiogram, 9/9/2022 2:27 AM     COMPARISON: Chest radiograph 9/5/2022, V/Q scan 8/21/2022 and  ultrasound upper extremity 4/20/2022.    HISTORY: Acute chest pain syndrome with concern for PE and/or  infection    TECHNIQUE: Volumetric helical acquisition of CT images of the chest  from the lung apices to the kidneys were acquired after the  administration of 80 mL of Isovue-370 IV contrast. Post-processed  multiplanar and/or MIP reformations were obtained, archived to PACS  and used in  interpretation of this study.     FINDINGS:      Contrast bolus is: suboptimal.  Exam is negative for acute central or  lobar pulmonary embolism. The segmental and subsegmental arteries are  not well evaluated due to bolus timing. No evidence for right heart  strain. Normal caliber pulmonary artery.    Remaining chest: Stable left chest port. No cardiomegaly or  significant pericardial effusion. Normal caliber thoracic aorta.  Normal esophagus. Residual thymic tissue. Prominent mediastinal and  axillary lymph nodes, likely reactive. No pneumothorax.  Small-to-moderate right and small left pleural effusions with  overlying atelectasis. Scattered small bilateral nodular opacities.    Upper abdomen: Cholecystectomy. Scattered prominent mesenteric and  retroperitoneal nodes, favored reactive.    Bones/Soft Tissues: No acute osseous abnormalities or suspicious bony  lesions. Mild anasarca.    Rounded low-density in the right supraclavicular region is present  measuring 2.6 x 2.9 cm, series 4 image 15. There was a cystic lesion  in this region on prior ultrasound measuring 2.8 x 3.0 x 2.2 cm. A few  additional bilateral supraclavicular nodes present.      Impression    IMPRESSION:   1. No acute central or lobar pulmonary embolism. Limited evaluation of  the segmental and subsegmental arteries due to bolus timing.  2. Small to moderate right and small left pleural effusions with  associated atelectasis. There are scattered small bilateral nodular  opacities suspicious for infectious or inflammatory etiology.  3. Cystic area in the right supraclavicular region demonstrated as  seen on recent ultrasound. There may also be some cervical lymph nodes  present. As discussed on prior ultrasound, could consider dedicated  CT/MRI soft tissue neck for further evaluation of these findings if  this has not been obtained at outside institution.      In the event of a positive result for acute pulmonary embolism  resulting in right  heart strain, consider calling the   [facility] hospital  for PERT (Pulmonary Embolism Response Team)  Activation?    PERT -- Pulmonary Embolism Response Team (Multidisciplinary team  including cardiology, interventional radiology, critical care,  hematology)    I have personally reviewed the examination and initial interpretation  and I agree with the findings.    KALI ROSARIO MD         SYSTEM ID:  [id number]   US Upper Extremity Venous Duplex Right     Value    Radiologist flags Right cystic lesion as above    Narrative    EXAMINATION: DOPPLER VENOUS ULTRASOUND OF THE RIGHT UPPER EXTREMITY,  9/12/2022 11:29 AM     COMPARISON: 9/9/2022, 4/20/2022.    HISTORY: Swelling.  Per chart: past medical history of sickle cell  disease, PE    TECHNIQUE:  Gray-scale evaluation with compression, spectral flow and  color Doppler assessment of the deep venous system of the right upper  extremity.    FINDINGS:  Right: Normal blood flow and waveforms are demonstrated in the  internal jugular, innominate, subclavian, and axillary veins. There is  normal compressibility of the brachial, basilic and cephalic veins.    Mild subcutaneous edema in region of right basilic vein.    Nonspecific unilocular cystic lesion in region immediately inferior  right clavicle without associated vascularity where partially  evaluated, 2.8 x 1.9 x 2.4 cm.  Adjacent soft tissue nodule, question  abnormal cervical lymph node, 1.4 x 1.3 x 1.8 cm.      Impression    IMPRESSION:  1.  No evidence of right upper extremity deep venous thrombosis.  2.  Indeterminate cystic lesion inferior to clavicle with concern for  surrounding lymphadenopathy.  This is of unknown clinical significance  and dates back to at least 4/20/2022 by ultrasound.  Dedicated cross  sectional imaging (CT or MRI soft tissue neck) for further assessment  is advised.    [Recommend Follow Up: Right cystic lesion as above ]    This report will be copied to the [facility] to  ensure a  provider acknowledges the finding. Access Center is available Monday  through Friday 8am-3:30 pm.    SHANNON NASCIMENTO MD         SYSTEM ID:  [id number]   Echo Limited     Value    LVEF  60-65%    Narrative    [id number]  [id number]  [id number]  [id number]^GARRY^ARELY     [facility]  Echocardiography Laboratory  [address] [place] [address]     Name: HIMANSHU AL  MRN: [id number]  : 2000  Study Date: 2022 01:57 PM  Age: 22 yrs  Gender: Female  Patient Location: UUU5A  Reason For Study: Pulmonary Emboli, Pulmonary Hypertension  Ordering Physician: ARELY CASTAÑEDA  Performed By: Isatu Shaffer     BSA: 1.9 m2  Height: 64 in  Weight: 188 lb  HR: 106  BP: 113/62 mmHg  ______________________________________________________________________________  Procedure  Limited Portable Echo Adult.  ______________________________________________________________________________  Interpretation Summary  Global and regional left ventricular function is normal with an EF of 60-65%.  Right ventricular function, chamber size, wall motion, and thickness are  normal.  Pulmonary artery systolic pressure is normal.  ______________________________________________________________________________  Left Ventricle  Global and regional left ventricular function is normal with an EF of 60-65%.     Right Ventricle  Right ventricular function, chamber size, wall motion, and thickness are  normal.     Tricuspid Valve  The tricuspid valve is normal. Trace tricuspid insufficiency is present. The  right ventricular systolic pressure is approximated at 11.0 mmHg plus the  right atrial pressure. Pulmonary artery systolic pressure is normal.     Vessels  The inferior vena cava is normal.     Pericardium  No pericardial effusion is present.  ______________________________________________________________________________  Doppler Measurements & Calculations  TR max rigo: 165.6 cm/sec  TR max P.0 mmHg      ______________________________________________________________________________  Report approved by: Chava Juárez 09/09/2022 02:22 PM           *Note: Due to a large number of results and/or encounters for the requested time period, some results have not been displayed. A complete set of results can be found in Results Review.       Discharge Medications   Current Discharge Medication List      CONTINUE these medications which have NOT CHANGED    Details   albuterol (PROVENTIL) (2.5 MG/3ML) 0.083% neb solution Take 2 vials (5 mg) by nebulization every 6 hours as needed for shortness of breath / dyspnea or wheezing  Qty: 90 mL, Refills: 3    Associated Diagnoses: Asthmatic bronchitis without complication, unspecified asthma severity, unspecified whether persistent      aspirin (ASA) 81 MG chewable tablet Take 1 tablet (81 mg) by mouth 2 times daily  Qty: 60 tablet, Refills: 11    Associated Diagnoses: Superficial venous thrombosis of arm, right      budesonide-formoterol (SYMBICORT) 160-4.5 MCG/ACT Inhaler Inhale 2 puffs into the lungs 2 times daily  Qty: 10.2 g, Refills: 3    Associated Diagnoses: Asthmatic bronchitis without complication, unspecified asthma severity, unspecified whether persistent      deferasirox (JADENU) 360 MG tablet Take 4 tablets (1,440 mg) by mouth every evening  Qty: 120 tablet, Refills: 4    Associated Diagnoses: Iron overload due to repeated red blood cell transfusions      EPINEPHrine (ANY BX GENERIC EQUIV) 0.3 MG/0.3ML injection 2-pack Inject 0.3 mLs (0.3 mg) into the muscle as needed for anaphylaxis  Qty: 1 each, Refills: 1    Associated Diagnoses: Anaphylaxis, sequela      Hydroxyurea 1000 MG TABS Take 3,000 mg by mouth daily  Qty: 90 tablet, Refills: 3    Associated Diagnoses: 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 Max 6 per day.  Qty: 40 tablet, Refills: 0    Associated Diagnoses: Sickle cell pain crisis (H)      acetaminophen (TYLENOL) 325 MG tablet Take 2 tablets (650 mg) by mouth every 6 hours as needed for mild pain  Qty: 120 tablet, Refills: 3    Associated Diagnoses: Sickle cell crisis (H)           Allergies   Allergies   Allergen Reactions     Contrast Dye      Hives and breathing issues     Fish-Derived Products Hives     Seafood Hives     Diagnostic X-Ray Materials      Gadolinium

## 2022-09-13 NOTE — DISCHARGE INSTRUCTIONS
You were hospitalized for an acute pain event and for pneumonia. Your pneumonia has been treated with antibiotics, and you don't need to take any other medications for it. Your pain was controlled with IV pain medications. You can continue your home pain medications after discharge until the pain event has completely resolved.

## 2022-09-13 NOTE — PLAN OF CARE
Goal Outcome Evaluation:    Plan of Care Reviewed With: patient     Overall Patient Progress: no change         Patient A&OX4. Compalins of back pain. Toradol IV and oxy are used to manage pain. Patients on Q2 hour toileting due to urgency to void. Femoral line will be discontinued tomorrow if no further recommendation from hemotology. Plan of discharge to home tomorrow.

## 2022-09-13 NOTE — PLAN OF CARE
DISCHARGE                         9/13 2022 1630 ----------------------------------------------------------------------------  Discharged to: Home  Via: public transportation  Accompanied by: Self  Discharge Instructions: diet, activity, medications, follow up appointments, when to call the MD, aftercare instructions.  Prescriptions: Medication list reviewed & sent with pt  Follow Up Appointments: arranged; information given  Belongings: All sent with pt  IV: d/c'd  Telemetry: d/c'd  Pt exhibits understanding of above discharge instructions; all questions answered.    Discharge Paperwork: Signed, copied, and sent home with patient.

## 2022-09-13 NOTE — PLAN OF CARE
Goal Outcome Evaluation:    Plan of Care Reviewed With: patient     Overall Patient Progress: improving    Outcome Evaluation: Alert and oriented x 4. Triggered sepsis at HS, lactic acid is 0.7, text paged Dr. Gonzales. Up to commode with standby assist. Denies need for pian med this shift. Slept in between cares. Possible discharge today. Vital signs stable on room air

## 2022-09-14 ENCOUNTER — HOME INFUSION (PRE-WILLOW HOME INFUSION) (OUTPATIENT)
Dept: PHARMACY | Facility: CLINIC | Age: 22
End: 2022-09-14

## 2022-09-14 ENCOUNTER — PATIENT OUTREACH (OUTPATIENT)
Dept: CARE COORDINATION | Facility: CLINIC | Age: 22
End: 2022-09-14

## 2022-09-14 NOTE — PROGRESS NOTES
This is a recent snapshot of the patient's [facility] medical record.  For current drug dose and complete information and questions, call [phone] or In Basket pool, [facility] home infusion ([id number])  CSN Number:  [id number]

## 2022-09-14 NOTE — PROGRESS NOTES
Clinic Care Coordination Contact  [facility]: Post-Discharge Note  SITUATION                                                      Admission:    Admission Date: 09/05/22   Reason for Admission: sickle cell pain event and pneumonia. Your pneumonia was treated with antibiotics  Discharge:   Discharge Date: 09/13/22  Discharge Diagnosis: sickle cell pain event and pneumonia. Your pneumonia was treated with antibiotics    BACKGROUND                                                      Per hospital discharge summary and inpatient provider notes:    Jennifer Cervantes is a 22 year old female who has a past medical history of sickle cell disease, CVA, PE, and cognitive delay who is presenting with shortness of breath and chest pain.     Patient developed acute chest pain and shortness of breath after ED visit earlier in day.  She reports diffuse back pain that started night prior to admission.  Patient states that this is her typical crisis pain. Received Toradol, LR, ketamine, and oxycodone. No motoric or neurological sensation loss. No cough.  No pleuritic pain.  No abdominal pain.  Currently she does not have any nausea or vomiting.  No recent trauma.  No bleeding per any orifice.  No headaches or vision changes.  No rhinorrhea.  No urinary symptoms.  No changes in urination or defecation.  Patient has been taking her medications.  She notes a contrast allergy and is okay with empirically treating for PE with heparin at this time.  No chronic disease in family.  Patient does not smoke or drink alcohol.  Full code.    ASSESSMENT           Discharge Assessment  How are you doing now that you are home?: I am doing fine.  How are your symptoms? (Red Flag symptoms escalate to triage hotline per guidelines): Improved  Do you feel your condition is stable enough to be safe at home until your provider visit?: Yes  Does the patient have their discharge instructions? : Yes  Does the patient have questions regarding their  discharge instructions? : No  Were you started on any new medications or were there changes to any of your previous medications? : Yes  Does the patient have all of their medications?: Yes  Do you have questions regarding any of your medications? : No  Discharge follow-up appointment scheduled within 14 calendar days? : Yes (Pt has physical on 9/28 with PCP but can use as hospital follow up)  Discharge Follow Up Appointment Date: 09/28/22  Discharge Follow Up Appointment Scheduled with?: Primary Care Provider                  PLAN                                                      Outpatient Plan: Follow up with primary care provider, Suraj Case, within 7 days for hospital follow- up. No follow up labs or test are  needed.    Future Appointments   Date Time Provider Department Center   9/26/2022 11:30 AM  [facility] LAB DRAW ONL [facility]   9/26/2022 12:00 PM Eric Duncan MD [facility]   9/26/2022  1:00 PM  ONC INFUSION NURSE [facility]   9/28/2022  9:30 AM Suraj Case MD [facility]   9/28/2022  3:30 PM  PFL D PFT [facility]   9/28/2022  4:15 PM Rosalva Gasca MD [facility]   10/12/2022  2:00 PM Katherine Pierce MD [facility]         For any urgent concerns, please contact our 24 hour nurse triage line: [phone] ([phone])         KHALIDA Contreras  [phone]  [facility]

## 2022-09-15 ENCOUNTER — PATIENT OUTREACH (OUTPATIENT)
Dept: CARE COORDINATION | Facility: CLINIC | Age: 22
End: 2022-09-15

## 2022-09-15 ENCOUNTER — INFUSION THERAPY VISIT (OUTPATIENT)
Dept: ONCOLOGY | Facility: CLINIC | Age: 22
End: 2022-09-15
Attending: PEDIATRICS
Payer: COMMERCIAL

## 2022-09-15 ENCOUNTER — NURSE TRIAGE (OUTPATIENT)
Dept: ONCOLOGY | Facility: CLINIC | Age: 22
End: 2022-09-15

## 2022-09-15 VITALS
TEMPERATURE: 99 F | HEART RATE: 103 BPM | SYSTOLIC BLOOD PRESSURE: 114 MMHG | DIASTOLIC BLOOD PRESSURE: 76 MMHG | RESPIRATION RATE: 20 BRPM | OXYGEN SATURATION: 97 %

## 2022-09-15 DIAGNOSIS — D57.00 SICKLE CELL PAIN CRISIS (H): ICD-10-CM

## 2022-09-15 DIAGNOSIS — G81.10 SPASTIC HEMIPLEGIA, UNSPECIFIED ETIOLOGY, UNSPECIFIED LATERALITY (H): Primary | ICD-10-CM

## 2022-09-15 PROCEDURE — 258N000003 HC RX IP 258 OP 636: Performed by: PEDIATRICS

## 2022-09-15 PROCEDURE — 250N000011 HC RX IP 250 OP 636: Performed by: PEDIATRICS

## 2022-09-15 PROCEDURE — 96374 THER/PROPH/DIAG INJ IV PUSH: CPT

## 2022-09-15 PROCEDURE — 96376 TX/PRO/DX INJ SAME DRUG ADON: CPT

## 2022-09-15 PROCEDURE — 96361 HYDRATE IV INFUSION ADD-ON: CPT

## 2022-09-15 PROCEDURE — 96375 TX/PRO/DX INJ NEW DRUG ADDON: CPT

## 2022-09-15 RX ORDER — MORPHINE SULFATE 2 MG/ML
2 INJECTION, SOLUTION INTRAMUSCULAR; INTRAVENOUS
Status: COMPLETED | OUTPATIENT
Start: 2022-09-15 | End: 2022-09-15

## 2022-09-15 RX ORDER — OXYCODONE HYDROCHLORIDE 15 MG/1
15 TABLET ORAL EVERY 4 HOURS PRN
Qty: 40 TABLET | Refills: 0 | Status: SHIPPED | OUTPATIENT
Start: 2022-09-15 | End: 2022-09-22

## 2022-09-15 RX ORDER — HEPARIN SODIUM (PORCINE) LOCK FLUSH IV SOLN 100 UNIT/ML 100 UNIT/ML
5 SOLUTION INTRAVENOUS
Status: CANCELLED | OUTPATIENT
Start: 2022-10-01

## 2022-09-15 RX ORDER — ONDANSETRON 8 MG/1
8 TABLET, FILM COATED ORAL
Status: CANCELLED
Start: 2022-10-01

## 2022-09-15 RX ORDER — HEPARIN SODIUM,PORCINE 10 UNIT/ML
5 VIAL (ML) INTRAVENOUS
Status: CANCELLED | OUTPATIENT
Start: 2022-10-01

## 2022-09-15 RX ORDER — MORPHINE SULFATE 2 MG/ML
2 INJECTION, SOLUTION INTRAMUSCULAR; INTRAVENOUS
Status: CANCELLED
Start: 2022-10-01

## 2022-09-15 RX ORDER — DIPHENHYDRAMINE HCL 25 MG
25 CAPSULE ORAL
Status: CANCELLED
Start: 2022-10-01

## 2022-09-15 RX ADMIN — MORPHINE SULFATE 2 MG: 2 INJECTION, SOLUTION INTRAMUSCULAR; INTRAVENOUS at 13:05

## 2022-09-15 RX ADMIN — DEXTROSE AND SODIUM CHLORIDE 1000 ML: 5; 450 INJECTION, SOLUTION INTRAVENOUS at 13:03

## 2022-09-15 RX ADMIN — MORPHINE SULFATE 2 MG: 2 INJECTION, SOLUTION INTRAMUSCULAR; INTRAVENOUS at 14:00

## 2022-09-15 RX ADMIN — MORPHINE SULFATE 2 MG: 2 INJECTION, SOLUTION INTRAMUSCULAR; INTRAVENOUS at 15:00

## 2022-09-15 NOTE — TELEPHONE ENCOUNTER
[facility] Triage    Pt called in to triage requesting IVF pain meds for back pain rated 8/10 x 2 days. Stated last took oxy this morning without relief. Denied any fevers, chills, cough, sob, chest pain or other symptoms.  Assess for confusion, numbness, paralysis, vomiting, headache, vision issues, difficulty walking and/or talking. Pt's last infusion was 9/12, last clinic visit 8/29 Perla  with follow-up on 9/26 Perla. Patient states does not have own ride and it will take 60 min long to get to cancer clinic.   Pt qualifies for sickle cell standing order protocol.  Confirmed at 735 am with Patricia Mantilla.    Refill for oxy sent    Routing to Perla, Patricia Mantilla and Arely Krueger    Ange Can take Jennifer Billy at 130    0806 am notified Jennifer along with the importance of not being late as we may not be able to reschedule that day.  She acknowledges    CCOD notified to place on appts for [facility] at 130  SW notified of ride and to call patient with time

## 2022-09-15 NOTE — TELEPHONE ENCOUNTER
[facility] Triage    Refill Request    Date of most recent appointment:  22 Perla  Next upcoming appointment:22 Perla    Medication requested:  Oxy 15 mg  Quantity:  40  Last fill date:    Person requesting refill:  Jennifer  Notes: NA    How are you takin a day      Are you having any side effects: no    How many do you have left: 6    Is this managing your pain: yes    What pharmacy [address] [place] [address]    Routing to Dr. Duncan

## 2022-09-15 NOTE — PROGRESS NOTES
Infusion Nursing Note:  Jennifer Cervantes presents today for IV fluids and pain meds.    Patient seen by provider today: No   present during visit today: Not Applicable.    Note: Pt presents today with pain.    Intravenous Access:  Peripheral IV placed.    Treatment Conditions:  Not Applicable.    Post Infusion Assessment:  Patient tolerated infusion without incident.  Blood return noted pre and post infusion.  Site patent and intact, free from redness, edema or discomfort.  No evidence of extravasations.  Access discontinued per protocol.     Discharge Plan:   Prescription refills given for oxycodone.  Discharge instructions reviewed with: Patient.  Patient and/or family verbalized understanding of discharge instructions and all questions answered.  AVS printed and sent to patient via [employer].  Patient will return 09/26/22 for next appointment.   Patient discharged in stable condition accompanied by: self.  Departure Mode: Ambulatory.      Emily Meese, RN

## 2022-09-15 NOTE — PATIENT INSTRUCTIONS
[facility] Triage and after hours / weekends / holidays:  [phone]    Please call the triage or after hours line if you experience a temperature greater than or equal to 100.4, shaking chills, have uncontrolled nausea, vomiting and/or diarrhea, dizziness, shortness of breath, chest pain, bleeding, unexplained bruising, or if you have any other new/concerning symptoms, questions or concerns.      If you are having any concerning symptoms or wish to speak to a provider before your next infusion visit, please call your care coordinator or triage to notify them so we can adequately serve you.     If you need a refill on a narcotic prescription or other medication, please call before your infusion appointment.                September 2022 Sunday Monday Tuesday Wednesday Thursday Friday Saturday                       1     2     3       4     5    Admission   6:14 AM   Claire Perez MD   [facility]   (Discharge: 9/5/2022)    Admission   9:25 PM   Lanre Royal MD   [facility] Unit [address] [place] [address]   (Discharge: 9/13/2022)    US LWR EXT VENOUS DUPLEX BILAT  10:10 PM   (40 min.)   UUUS1   [facility] Imaging    XR CHEST PORT 1 VIEW  10:30 PM   (20 min.)   [id number]   [facility] 6     7    LAB   8:45 AM   (15 min.)   UC LAB   [facility] NURSE VISIT   9:00 AM   (30 min.)   Nurse, Mildred Pcc   [facility] [place]    IR NON TUNNL CV CATH PLACEMNT   1:00 PM   (60 min.)   UUIR5   [facility] Interventional Radiology    RED BLOOD CELL EXCHANGE   2:00 PM   (200 min.)   Rn2, Uu Apheresis   [facility] 8     9    CT CHEST PULMONARY EMBOLISM W   1:45 AM   (20 min.)   UUCT4   [facility] Imaging    ECHO LIMITED   1:50 PM   (60 min.)   [id number]   [facility] 10       11     12    US  VENOUS  10:10 AM   (30 min.)   UUUS2   [facility] 13     14     15    ONC INFUSION 2 HR (120 MIN)   1:30 PM   (120 min.)    ONC INFUSION NURSE   [facility] 16     17       18     19     20     21     22     23     24       25     26    LAB PERIPHERAL  11:30 AM   (15 min.)   [facility] LAB DRAW   [facility]    RETURN  11:45 AM   (30 min.)   Eric Duncan MD   [facility]    ONC INFUSION 2 HR (120 MIN)   1:00 PM   (120 min.)    ONC INFUSION NURSE   [facility] 27     28    [facility] PHYSICAL   9:15 AM   (30 min.)   Suraj Case MD   [facility] Internal Medicine [facility] PFT   3:15 PM   (30 min.)    PFL D   [facility] Pulmonary Function Testing [facility] RETURN   4:00 PM   (40 min.)   Rosalva Gasca MD   [facility] 29 30 October 2022 Sunday Monday Tuesday Wednesday Thursday Friday Saturday                                 1       2     3     4     5     6     7     8       9     10     11     12    RETURN   1:45 PM   (60 min.)   Katherine Pierce MD   [facility] 13     14     15       16     17     18     19     20     21     22       23     24     25     26     27     28     29       30     31                                                Lab Results:  No results found for this or any previous visit (from the past 12 hour(s)).

## 2022-09-15 NOTE — PROGRESS NOTES
Social Work Note: Telephone Call  Oncology Clinic     Data/Intervention:  Patient Name:  Jennifer Cervantes  /Age: 2000, 22 years old     Call From: [facility] Triage        Reason for Call:  Transportation     Assessment:   called [employer] to arrange ride through patient's insurance. [employer] arranged  for patient from home with [employer] ([phone]).  Patient will need to call when ready for return ride home ([phone]).      Plan:  Patient is aware of the transportation plan.  available to assist with any other needs.      CARLOS Chavez,LGSW  Hematology/Oncology Social Worker  Phone:[phone] Pager: [phone]

## 2022-09-16 ENCOUNTER — INFUSION THERAPY VISIT (OUTPATIENT)
Dept: TRANSPLANT | Facility: CLINIC | Age: 22
End: 2022-09-16
Attending: PEDIATRICS
Payer: COMMERCIAL

## 2022-09-16 ENCOUNTER — TELEPHONE (OUTPATIENT)
Dept: ONCOLOGY | Facility: CLINIC | Age: 22
End: 2022-09-16

## 2022-09-16 ENCOUNTER — PATIENT OUTREACH (OUTPATIENT)
Dept: CARE COORDINATION | Facility: CLINIC | Age: 22
End: 2022-09-16

## 2022-09-16 VITALS
HEART RATE: 117 BPM | SYSTOLIC BLOOD PRESSURE: 124 MMHG | RESPIRATION RATE: 18 BRPM | OXYGEN SATURATION: 97 % | DIASTOLIC BLOOD PRESSURE: 86 MMHG | TEMPERATURE: 98.4 F

## 2022-09-16 DIAGNOSIS — G81.10 SPASTIC HEMIPLEGIA, UNSPECIFIED ETIOLOGY, UNSPECIFIED LATERALITY (H): Primary | ICD-10-CM

## 2022-09-16 DIAGNOSIS — D57.00 SICKLE CELL PAIN CRISIS (H): ICD-10-CM

## 2022-09-16 LAB
BACTERIA BLD CULT: NO GROWTH
BACTERIA BLD CULT: NO GROWTH

## 2022-09-16 PROCEDURE — 96361 HYDRATE IV INFUSION ADD-ON: CPT

## 2022-09-16 PROCEDURE — 250N000011 HC RX IP 250 OP 636: Performed by: PEDIATRICS

## 2022-09-16 PROCEDURE — 96376 TX/PRO/DX INJ SAME DRUG ADON: CPT

## 2022-09-16 PROCEDURE — 258N000003 HC RX IP 258 OP 636: Performed by: PEDIATRICS

## 2022-09-16 PROCEDURE — 96374 THER/PROPH/DIAG INJ IV PUSH: CPT

## 2022-09-16 RX ORDER — MORPHINE SULFATE 2 MG/ML
2 INJECTION, SOLUTION INTRAMUSCULAR; INTRAVENOUS
Status: DISCONTINUED | OUTPATIENT
Start: 2022-09-16 | End: 2022-09-16 | Stop reason: HOSPADM

## 2022-09-16 RX ORDER — ONDANSETRON 8 MG/1
8 TABLET, FILM COATED ORAL
Status: CANCELLED
Start: 2022-10-01

## 2022-09-16 RX ORDER — DIPHENHYDRAMINE HCL 25 MG
25 CAPSULE ORAL
Status: CANCELLED
Start: 2022-10-01

## 2022-09-16 RX ORDER — DIPHENHYDRAMINE HCL 25 MG
25 CAPSULE ORAL
Status: DISCONTINUED | OUTPATIENT
Start: 2022-09-16 | End: 2022-09-16 | Stop reason: HOSPADM

## 2022-09-16 RX ORDER — HEPARIN SODIUM,PORCINE 10 UNIT/ML
5 VIAL (ML) INTRAVENOUS
Status: CANCELLED | OUTPATIENT
Start: 2022-10-01

## 2022-09-16 RX ORDER — HEPARIN SODIUM (PORCINE) LOCK FLUSH IV SOLN 100 UNIT/ML 100 UNIT/ML
5 SOLUTION INTRAVENOUS
Status: CANCELLED | OUTPATIENT
Start: 2022-10-01

## 2022-09-16 RX ORDER — MORPHINE SULFATE 2 MG/ML
2 INJECTION, SOLUTION INTRAMUSCULAR; INTRAVENOUS
Status: CANCELLED
Start: 2022-10-01

## 2022-09-16 RX ADMIN — MORPHINE SULFATE 2 MG: 2 INJECTION, SOLUTION INTRAMUSCULAR; INTRAVENOUS at 11:47

## 2022-09-16 RX ADMIN — MORPHINE SULFATE 2 MG: 2 INJECTION, SOLUTION INTRAMUSCULAR; INTRAVENOUS at 12:59

## 2022-09-16 RX ADMIN — MORPHINE SULFATE 2 MG: 2 INJECTION, SOLUTION INTRAMUSCULAR; INTRAVENOUS at 13:49

## 2022-09-16 RX ADMIN — DEXTROSE AND SODIUM CHLORIDE 1000 ML: 5; 450 INJECTION, SOLUTION INTRAVENOUS at 11:46

## 2022-09-16 ASSESSMENT — PAIN SCALES - GENERAL: PAINLEVEL: EXTREME PAIN (8)

## 2022-09-16 NOTE — TELEPHONE ENCOUNTER
Oncology Nurse Triage - Sickle Cell Pain Crisis:    Situation: Jennifer (pt)calling about Sickle Cell Pain Crisis    Background:     Patient's last infusion was 9/15/22  Last clinic visit date:8/29/22 Dr. duncan  Next follow-up appt on 9/26/22 with provider Dr. Duncan  Does patient have active treatment plan?  Yes    Assessment of Symptoms:  Onset/Duration of symptoms: 1 day    Is it typical sickle cell pain? Yes   Location: [facility]  Character: Sharp           Intensity: 8/10    Any radiation of pain, numbness, tingling, weakness, warmth, swelling, discoloration of extremities?No     Fever?No    Chest Pain Present: No     Shortness of breath: No     Other home therapies tried: HEAT/HEATING PAD     Last home medication taken and when: 7am, oxycdone 15mg    Any Refills Needed?: No     Does patient have transportation & length of time to get to clinic: Yes       Grades for reference:       Grade 1 -   o Patient has active treatment plan.   o Patients last scheduled clinic appointment was attended  o Patient has not been seen in infusion or ED in the last 3 days (clarify exclusions in therapy plans)   o Afebrile   o Typically sickle cell pain   o Home medications have been tried   o No other symptoms     Recommendations:   Meets protocol per therapy plan.       If you do not hear from the infusion center by 2pm then you will not be able to get in for an infusion today. If symptoms worsen while waiting for call back, and/or you experience fever, chills, SOB, chest pain, cough, n/v, dizziness, numbness, swelling, discoloration of extremities, then seek emergency evaluation in Emergency Department.     Please note, if you are late for your appt, you risk losing your infusion appt as it may delay another patient's infusion who arrived on time.

## 2022-09-16 NOTE — LETTER
Date:September 17, 2022      Provider requested that no letter be sent. Do not send.       [facility]

## 2022-09-16 NOTE — LETTER
9/16/2022         RE: Jennifer Cervantes  [address]  [place] [address]        Dear Colleague,    Thank you for referring your patient, Jennifer Cervantes, to the [facility]OD [facility]OW [facility]PLANT [facility] [place]. Please see a copy of my visit note below.    Infusion Nursing Note:  Jennifer Cervantes presents today for add on IVF and pain meds.    Patient seen by provider today: No   present during visit today: Not Applicable.    Note: No labs/no provider visit. 1L D51/2NS given over 2hrs. IV Morphine 2mg give Q1HR for max 3 doses.    Intravenous Access:  Implanted Port.  +BR noted pre and post infusion  De-accessed prior to discharge.    Treatment Conditions:  Not applicable.    Post Infusion Assessment:  Patient tolerated infusion without incident.     Discharge Plan:   Patient discharged in stable condition accompanied by: self.      Allison Wiggins RN                          Again, thank you for allowing me to participate in the care of your patient.        Sincerely,        [facility]     Inflammation Suggestive Of Cancer Camouflage Histology Text: There was a dense lymphocytic infiltrate which prevented adequate histologic evaluation of adjacent structures.

## 2022-09-16 NOTE — PROGRESS NOTES
Social Work Note: Telephone Call  Oncology Clinic     Data/Intervention:  Patient Name:  Jennifer Cervantes  /Age: 2000, 22 years old     Call From: [facility] Triage        Reason for Call:  Transportation     Assessment:   called [employer] ([phone]) to arrange ride. [employer] arranged  for patient from home with a will call ride. Patient will need to call when ready for return ride home ([phone]).      Plan:  Patient is aware of the transportation plan.  available to assist with any other needs.      CARLOS Chavez,[facility]  Hematology/Oncology Social Worker  Phone:[phone] Pager: [phone]

## 2022-09-16 NOTE — TELEPHONE ENCOUNTER
Call placed to Jennifer to see if she can get aride in or if she needs transportation states she needs transportation.   Will schedule for 11:30 am IVF/pain meds   Message sent to social work to arrange transportation  and CCOD to schedule appointment.

## 2022-09-16 NOTE — PROGRESS NOTES
Infusion Nursing Note:  Jennifer Cervantes presents today for add on IVF and pain meds.    Patient seen by provider today: No   present during visit today: Not Applicable.    Note: No labs/no provider visit. 1L D51/2NS given over 2hrs. IV Morphine 2mg give Q1HR for max 3 doses.    Intravenous Access:  Implanted Port.  +BR noted pre and post infusion  De-accessed prior to discharge.    Treatment Conditions:  Not applicable.    Post Infusion Assessment:  Patient tolerated infusion without incident.     Discharge Plan:   Patient discharged in stable condition accompanied by: self.      Allison Wiggins RN

## 2022-09-19 ENCOUNTER — INFUSION THERAPY VISIT (OUTPATIENT)
Dept: ONCOLOGY | Facility: CLINIC | Age: 22
End: 2022-09-19
Attending: PEDIATRICS
Payer: COMMERCIAL

## 2022-09-19 ENCOUNTER — HOME INFUSION (PRE-WILLOW HOME INFUSION) (OUTPATIENT)
Dept: PHARMACY | Facility: CLINIC | Age: 22
End: 2022-09-19

## 2022-09-19 ENCOUNTER — TELEPHONE (OUTPATIENT)
Dept: INTERNAL MEDICINE | Facility: CLINIC | Age: 22
End: 2022-09-19

## 2022-09-19 ENCOUNTER — PATIENT OUTREACH (OUTPATIENT)
Dept: CARE COORDINATION | Facility: CLINIC | Age: 22
End: 2022-09-19

## 2022-09-19 ENCOUNTER — TELEPHONE (OUTPATIENT)
Dept: ONCOLOGY | Facility: CLINIC | Age: 22
End: 2022-09-19

## 2022-09-19 VITALS
SYSTOLIC BLOOD PRESSURE: 112 MMHG | RESPIRATION RATE: 18 BRPM | TEMPERATURE: 98.5 F | HEART RATE: 110 BPM | OXYGEN SATURATION: 98 % | DIASTOLIC BLOOD PRESSURE: 70 MMHG

## 2022-09-19 DIAGNOSIS — D57.00 SICKLE CELL PAIN CRISIS (H): ICD-10-CM

## 2022-09-19 DIAGNOSIS — G81.10 SPASTIC HEMIPLEGIA, UNSPECIFIED ETIOLOGY, UNSPECIFIED LATERALITY (H): Primary | ICD-10-CM

## 2022-09-19 PROCEDURE — 96376 TX/PRO/DX INJ SAME DRUG ADON: CPT

## 2022-09-19 PROCEDURE — 96374 THER/PROPH/DIAG INJ IV PUSH: CPT

## 2022-09-19 PROCEDURE — 250N000011 HC RX IP 250 OP 636: Performed by: PEDIATRICS

## 2022-09-19 PROCEDURE — 258N000003 HC RX IP 258 OP 636: Performed by: PEDIATRICS

## 2022-09-19 PROCEDURE — 96361 HYDRATE IV INFUSION ADD-ON: CPT

## 2022-09-19 RX ORDER — HEPARIN SODIUM,PORCINE 10 UNIT/ML
5 VIAL (ML) INTRAVENOUS
Status: CANCELLED | OUTPATIENT
Start: 2022-10-01

## 2022-09-19 RX ORDER — HEPARIN SODIUM (PORCINE) LOCK FLUSH IV SOLN 100 UNIT/ML 100 UNIT/ML
5 SOLUTION INTRAVENOUS
Status: CANCELLED | OUTPATIENT
Start: 2022-10-01

## 2022-09-19 RX ORDER — MORPHINE SULFATE 2 MG/ML
2 INJECTION, SOLUTION INTRAMUSCULAR; INTRAVENOUS
Status: DISCONTINUED | OUTPATIENT
Start: 2022-09-19 | End: 2022-09-19 | Stop reason: HOSPADM

## 2022-09-19 RX ORDER — MORPHINE SULFATE 2 MG/ML
2 INJECTION, SOLUTION INTRAMUSCULAR; INTRAVENOUS
Status: CANCELLED
Start: 2022-10-01

## 2022-09-19 RX ORDER — DIPHENHYDRAMINE HCL 25 MG
25 CAPSULE ORAL
Status: CANCELLED
Start: 2022-10-01

## 2022-09-19 RX ORDER — ONDANSETRON 8 MG/1
8 TABLET, FILM COATED ORAL
Status: CANCELLED
Start: 2022-10-01

## 2022-09-19 RX ORDER — HEPARIN SODIUM (PORCINE) LOCK FLUSH IV SOLN 100 UNIT/ML 100 UNIT/ML
5 SOLUTION INTRAVENOUS
Status: DISCONTINUED | OUTPATIENT
Start: 2022-09-19 | End: 2022-09-19 | Stop reason: HOSPADM

## 2022-09-19 RX ADMIN — Medication 5 ML: at 11:39

## 2022-09-19 RX ADMIN — MORPHINE SULFATE 2 MG: 2 INJECTION, SOLUTION INTRAMUSCULAR; INTRAVENOUS at 09:27

## 2022-09-19 RX ADMIN — DEXTROSE AND SODIUM CHLORIDE 1000 ML: 5; 450 INJECTION, SOLUTION INTRAVENOUS at 09:25

## 2022-09-19 RX ADMIN — MORPHINE SULFATE 2 MG: 2 INJECTION, SOLUTION INTRAMUSCULAR; INTRAVENOUS at 10:25

## 2022-09-19 RX ADMIN — MORPHINE SULFATE 2 MG: 2 INJECTION, SOLUTION INTRAMUSCULAR; INTRAVENOUS at 11:28

## 2022-09-19 ASSESSMENT — PAIN SCALES - GENERAL: PAINLEVEL: EXTREME PAIN (9)

## 2022-09-19 NOTE — TELEPHONE ENCOUNTER
Ange can offer apt at 0900  Called Jennifer who confirmed she can come at 0900.  Updated charge nurse.  Message sent to CCOD  Contacted  for ride.  states they can arrange a ride for pt.     Routed to Dr. Duncan and Arely Krueger, [facility]

## 2022-09-19 NOTE — TELEPHONE ENCOUNTER
[facility] Call Center    Phone Message    May a detailed message be left on voicemail: yes     Reason for Call: Other: Other: Per call from KAYLAN Hoyt: reached out to patient today to complete hospital discharged follow up from 9/6-9/13 for pneumonia treatment. Per dorcas Hoyt feeling better, no cough, no chest pain and doesn't need antibiotics or nebulizer. Patient reported doing well, eating well and no further symptoms.       Action Taken: Message routed to:  [facility] PCC    Travel Screening: Not Applicable

## 2022-09-19 NOTE — PROGRESS NOTES
"Infusion Nursing Note:  Jennifer Cervantes presents today for IVF and pain control.    Patient seen by provider today: No   present during visit today: Not Applicable.    Note: Pt presents to infusion in 9/10 pain in her lower back and bilateral \"sides\", stating this is her usual sickle cell pain. She took her most recent dose of Oxycodone at 0600 this morning without relief. She has mild SOB on exertion, but states her breathing feels much improved since discharging from the hospital. 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 She denies fevers/chills, cough, nausea, chest pain, bruising/bleeding or further concerns today.    IVF given over 2 hours. Pt declined need for Benadryl or Zofran today. 2mg Morphine given x3 - pain decreased to 5/10 by end of visit. Pt feels safe to return home.    Intravenous Access:  Implanted Port.    Post Infusion Assessment:  Patient tolerated infusion without incident.  Blood return noted pre and post infusion.  Site patent and intact, free from redness, edema or discomfort.  No evidence of extravasations.  Access discontinued per protocol.     Discharge Plan:   Patient declined prescription refills.  Discharge instructions reviewed with: Patient.  Patient and/or family verbalized understanding of discharge instructions and all questions answered.  AVS to patient via [employer]. Patient will return 9/26/22 for next appointment.   Patient discharged in stable condition accompanied by: self.  Departure Mode: Ambulatory.      Jaida Kelly RN                      "

## 2022-09-19 NOTE — TELEPHONE ENCOUNTER
Pt called in to triage requesting IVF pain meds for bilateral side pain and back pain rated 9/10 x 2 days. Stated last took prn oxycodone at 0600 this morning without relief. Denied any fevers, chills, cough, sob, chest pain, numbness or tingling or other symptoms not typical of sickle cell pain.   Pt's last infusion was 9/16, last clinic visit 8/29/22 with follow-up on 9/26/22 with Dr. Duncan.   Patient states needs transportation, 20 min away.  Pt denied being out of home medications and needing any refills today.   Pt qualifies for sickle cell standing order protocol.  If you do not hear from the infusion center by 2pm then you will not be able to get in for an infusion today.

## 2022-09-19 NOTE — PROGRESS NOTES
Social Work Note: Telephone Call  Oncology Clinic     Data/Intervention:  Patient Name:  Jennifer Cervantes  /Age: 2000, 22 years old     Call From: [facility] Triage        Reason for Call:  Transportation     Assessment:   called [employer] ([phone]) to arrange ride. [employer] arranged  for patient from home with a will call ride.  Patient will need to call when ready for return ride home ([phone]).      Plan:  Patient is aware of the transportation plan.  available to assist with any other needs.      CARLOS Chavez,[facility]  Hematology/Oncology Social Worker  Phone:[phone] Pager: [phone]

## 2022-09-19 NOTE — PATIENT INSTRUCTIONS
[facility] Triage and after hours / weekends / holidays:  [phone]    Please call the triage or after hours line if you experience a temperature greater than or equal to 100.4, shaking chills, have uncontrolled nausea, vomiting and/or diarrhea, dizziness, shortness of breath, chest pain, bleeding, unexplained bruising, or if you have any other new/concerning symptoms, questions or concerns.      If you are having any concerning symptoms or wish to speak to a provider before your next infusion visit, please call your care coordinator or triage to notify them so we can adequately serve you.     If you need a refill on a narcotic prescription or other medication, please call before your infusion appointment.                September 2022 Sunday Monday Tuesday Wednesday Thursday Friday Saturday                       1     2     3       4     5    Admission   6:14 AM   Claire Perez MD   [facility] Emergency Department   (Discharge: 9/5/2022)    Admission   9:25 PM   Lanre Royal MD   [facility] Unit [address] [place] [address]   (Discharge: 9/13/2022)    US LWR EXT VENOUS DUPLEX BILAT  10:10 PM   (40 min.)   UUUS1   [facility] Imaging    XR CHEST PORT 1 VIEW  10:30 PM   (20 min.)   [id number]   [facility] 6     7    LAB   8:45 AM   (15 min.)   UC LAB   [facility] NURSE VISIT   9:00 AM   (30 min.)   Nurse, Mildred Pcc   [facility] Internal Medicine [employer]    IR NON TUNNL CV CATH PLACEMNT   1:00 PM   (60 min.)   UUIR5   [facility] Interventional Radiology    RED BLOOD CELL EXCHANGE   2:00 PM   (200 min.)   Rn2, Uu Apheresis   [facility] 8     9    CT CHEST PULMONARY EMBOLISM W   1:45 AM   (20 min.)   UUCT4   [facility] Imaging    ECHO LIMITED   1:50 PM   (60 min.)   [id number]   [facility] Care 10       11     12    US  VENOUS  10:10 AM   (30 min.)   UUUS2   [facility] Imaging 13     14     15    ONC INFUSION 2 HR (120 MIN)   1:30 PM   (120 min.)    ONC INFUSION NURSE   [facility] 16    BMT INFUSION 2 HR (120 MIN)  11:30 AM   (120 min.)    BMT INFUSION NURSE   [facility] [place] 17       18     19    ONC INFUSION 2 HR (120 MIN)   9:00 AM   (120 min.)    ONC INFUSION NURSE   [facility] 20     21     22     23     24       25     26    LAB PERIPHERAL  11:30 AM   (15 min.)   [facility] LAB DRAW   [facility]    RETURN  11:45 AM   (30 min.)   Eric Duncan MD   [facility]    ONC INFUSION 2 HR (120 MIN)   1:00 PM   (120 min.)    ONC INFUSION NURSE   [facility] 27     28    [facility] PHYSICAL   9:15 AM   (30 min.)   Suraj Case MD   [facility] Internal Medicine [facility] PFT   3:15 PM   (30 min.)    PFL D   [facility] Pulmonary Function Testing [facility] RETURN   4:00 PM   (40 min.)   Rosalva Gasca MD   [facility] [place] 29 30 October 2022 Sunday Monday Tuesday Wednesday Thursday Friday Saturday                                 1       2     3     4     5     6     7     8       9     10     11     12    RETURN   1:45 PM   (60 min.)   Katherine Pierce MD   [facility] 13     14     15       16     17     18     19     20     21     22       23     24     25     26     27     28     29       30     31                                                Lab Results:  No results found for this or any previous visit (from the past 12 hour(s)).

## 2022-09-21 NOTE — PROGRESS NOTES
This is a recent snapshot of the patient's [facility] medical record.  For current drug dose and complete information and questions, call [phone] or In Basket pool, fv home infusion ([id number])  CSN Number:  [id number]

## 2022-09-22 ENCOUNTER — TELEPHONE (OUTPATIENT)
Dept: ONCOLOGY | Facility: CLINIC | Age: 22
End: 2022-09-22

## 2022-09-22 ENCOUNTER — INFUSION THERAPY VISIT (OUTPATIENT)
Dept: TRANSPLANT | Facility: CLINIC | Age: 22
End: 2022-09-22
Attending: PEDIATRICS
Payer: COMMERCIAL

## 2022-09-22 ENCOUNTER — PATIENT OUTREACH (OUTPATIENT)
Dept: CARE COORDINATION | Facility: CLINIC | Age: 22
End: 2022-09-22

## 2022-09-22 VITALS
RESPIRATION RATE: 16 BRPM | OXYGEN SATURATION: 98 % | HEART RATE: 108 BPM | DIASTOLIC BLOOD PRESSURE: 90 MMHG | SYSTOLIC BLOOD PRESSURE: 125 MMHG | TEMPERATURE: 98.5 F

## 2022-09-22 DIAGNOSIS — D57.00 SICKLE CELL PAIN CRISIS (H): ICD-10-CM

## 2022-09-22 DIAGNOSIS — G81.10 SPASTIC HEMIPLEGIA, UNSPECIFIED ETIOLOGY, UNSPECIFIED LATERALITY (H): Primary | ICD-10-CM

## 2022-09-22 PROCEDURE — 96376 TX/PRO/DX INJ SAME DRUG ADON: CPT

## 2022-09-22 PROCEDURE — 258N000003 HC RX IP 258 OP 636: Performed by: PEDIATRICS

## 2022-09-22 PROCEDURE — 250N000011 HC RX IP 250 OP 636: Performed by: PEDIATRICS

## 2022-09-22 PROCEDURE — 96361 HYDRATE IV INFUSION ADD-ON: CPT

## 2022-09-22 PROCEDURE — 96374 THER/PROPH/DIAG INJ IV PUSH: CPT

## 2022-09-22 RX ORDER — ONDANSETRON 8 MG/1
8 TABLET, FILM COATED ORAL
Status: CANCELLED
Start: 2022-10-01

## 2022-09-22 RX ORDER — HEPARIN SODIUM (PORCINE) LOCK FLUSH IV SOLN 100 UNIT/ML 100 UNIT/ML
5 SOLUTION INTRAVENOUS
Status: DISCONTINUED | OUTPATIENT
Start: 2022-09-22 | End: 2022-09-22 | Stop reason: HOSPADM

## 2022-09-22 RX ORDER — DIPHENHYDRAMINE HCL 25 MG
25 CAPSULE ORAL
Status: CANCELLED
Start: 2022-10-01

## 2022-09-22 RX ORDER — HEPARIN SODIUM,PORCINE 10 UNIT/ML
5 VIAL (ML) INTRAVENOUS
Status: CANCELLED | OUTPATIENT
Start: 2022-10-01

## 2022-09-22 RX ORDER — MORPHINE SULFATE 2 MG/ML
2 INJECTION, SOLUTION INTRAMUSCULAR; INTRAVENOUS
Status: CANCELLED
Start: 2022-10-01

## 2022-09-22 RX ORDER — HEPARIN SODIUM (PORCINE) LOCK FLUSH IV SOLN 100 UNIT/ML 100 UNIT/ML
5 SOLUTION INTRAVENOUS
Status: CANCELLED | OUTPATIENT
Start: 2022-10-01

## 2022-09-22 RX ORDER — OXYCODONE HYDROCHLORIDE 15 MG/1
15 TABLET ORAL EVERY 4 HOURS PRN
Qty: 40 TABLET | Refills: 0 | Status: SHIPPED | OUTPATIENT
Start: 2022-09-22 | End: 2022-09-29

## 2022-09-22 RX ORDER — MORPHINE SULFATE 2 MG/ML
2 INJECTION, SOLUTION INTRAMUSCULAR; INTRAVENOUS
Status: DISCONTINUED | OUTPATIENT
Start: 2022-09-22 | End: 2022-09-22 | Stop reason: HOSPADM

## 2022-09-22 RX ADMIN — MORPHINE SULFATE 2 MG: 2 INJECTION, SOLUTION INTRAMUSCULAR; INTRAVENOUS at 11:28

## 2022-09-22 RX ADMIN — MORPHINE SULFATE 2 MG: 2 INJECTION, SOLUTION INTRAMUSCULAR; INTRAVENOUS at 09:24

## 2022-09-22 RX ADMIN — DEXTROSE AND SODIUM CHLORIDE 1000 ML: 5; 450 INJECTION, SOLUTION INTRAVENOUS at 09:18

## 2022-09-22 RX ADMIN — MORPHINE SULFATE 2 MG: 2 INJECTION, SOLUTION INTRAMUSCULAR; INTRAVENOUS at 10:26

## 2022-09-22 RX ADMIN — Medication 5 ML: at 11:35

## 2022-09-22 ASSESSMENT — PAIN SCALES - GENERAL: PAINLEVEL: EXTREME PAIN (9)

## 2022-09-22 NOTE — NURSING NOTE
"Oncology Rooming Note    September 22, 2022 11:03 AM   Jennifer Cervantes is a 22 year old female who presents for:    Chief Complaint   Patient presents with     Infusion     IV fluids and pain medications.  History of sickle cell disease.     Initial Vitals: BP (!) 125/90 (BP Location: Right arm, Patient Position: Sitting, Cuff Size: Adult Regular)   Pulse 108   Temp 98.5  F (36.9  C) (Oral)   Resp 16   SpO2 98%  Estimated body mass index is 31.5 kg/m  as calculated from the following:    Height as of 9/5/22: 1.626 m (5' 4\").    Weight as of 9/11/22: 83.2 kg (183 lb 8 oz). There is no height or weight on file to calculate BSA.  Extreme Pain (9) Comment: Data Unavailable   No LMP recorded. Patient has had an injection.  Allergies reviewed: Yes  Medications reviewed: Yes    Medications: MEDICATION REFILLS NEEDED TODAY. Provider was notified.  Pharmacy name entered into [employer]: [place], [place] - [address] [place] [address] [phone]    Clinical concerns: none       Jamaica Napoles RN              "

## 2022-09-22 NOTE — PROGRESS NOTES
Infusion Nursing Note:  Jennifer Cervantes presents today for IV fluids and pain medications.    Patient seen by provider today: No   present during visit today: Not Applicable.    Note:   Pt received a liter of D5.45NS at 500 ml/hr.    Pt received 2 mg IV morphine, repeated every hour for a total of 3 doses.    Intravenous Access:  Implanted Port.    Treatment Conditions:  Not Applicable.    Post Infusion Assessment:  Patient tolerated infusion without incident.  Blood return noted pre and post infusion.  Site patent and intact, free from redness, edema or discomfort.  No evidence of extravasations.  Access discontinued per protocol.     Discharge Plan:   Prescription refills ready at retail pharmacy (oxycodone).  Discharge instructions reviewed with: Patient.   Patient discharged in stable condition accompanied by: self.      Jamaica Napoles RN

## 2022-09-22 NOTE — TELEPHONE ENCOUNTER
BMT can offer 0900 apt.  Called Jennifer who confirmed she can come in for apt at 0900  Updated Charge nurse  Sent message to CCOD and SW to schedule apt and assist with transportation.    Routed to Dr. Duncan and MAURISIO Asencio

## 2022-09-22 NOTE — LETTER
9/22/2022         RE: Jennifer Cervantes  [address]  [facility] [address]    Dear Colleague,    Thank you for referring your patient, Jennifer Cervantes, to the [facility] [place]. Please see a copy of my visit note below.    Infusion Nursing Note:  Jennifer Cervantes presents today for IV fluids and pain medications.    Patient seen by provider today: No   present during visit today: Not Applicable.    Note:   Pt received a liter of D5.45NS at 500 ml/hr.    Pt received 2 mg IV morphine, repeated every hour for a total of 3 doses.    Intravenous Access:  Implanted Port.    Treatment Conditions:  Not Applicable.    Post Infusion Assessment:  Patient tolerated infusion without incident.  Blood return noted pre and post infusion.  Site patent and intact, free from redness, edema or discomfort.  No evidence of extravasations.  Access discontinued per protocol.     Discharge Plan:   Prescription refills ready at retail pharmacy (oxycodone).  Discharge instructions reviewed with: Patient.   Patient discharged in stable condition accompanied by: self.      Jamaica Napoles RN

## 2022-09-22 NOTE — TELEPHONE ENCOUNTER
Narcotic Refill Request    Medication(s) requested:  Oxycodone IR 15 MG tablet  Person Requesting Refill:Jennifer  What pain is the medication treating: sickle cell pain  How is the medication being taken?:taking one tablet q4h  Does pt have enough for today? yes  Is pain being adequately controlled on the current regimen?: yes  Experiencing any side effects from medication?: no    Date of most recent appointment:  8/29/22 with Dr. Duncan  Any No Show Visits:No  Next appointment:   9/26/22 with Dr. Duncan  Last fill date and by whom:  9/15/22 by Dr. Duncan   Reviewed: No    Routed/Paged provider: Pended and Routed to provider.

## 2022-09-22 NOTE — PROGRESS NOTES
Social Work Note: Telephone Call  Oncology Clinic     Data/Intervention:  Patient Name:  Jennifer Cervantes  /Age: 2000, 22 years old     Call From: [facility]         Reason for Call:  Transportation     Assessment:   called [employer] ([phone]) to arrange ride. [employer] arranged  for patient from home with a will call return ride.  Patient will need to call when ready for return ride home ([phone]).      Plan:  Patient is aware of the transportation plan.  available to assist with any other needs.      CARLOS Chavez,[facility]  Hematology/Oncology Social Worker  Phone:[phone] Pager: [phone]

## 2022-09-22 NOTE — TELEPHONE ENCOUNTER
Pt called in to triage requesting IVF pain meds for rib cage pain and back pain rated 8/10 x 2 days. Stated last took prn oxycodone at 0600 this morning without relief. Denied any fevers, chills, cough, sob, chest pain, numbness or tingling or other symptoms not typical of sickle cell pain.   Pt's last infusion was 9/19, last clinic visit 8/29/22 with follow-up on 9/26/22 with Dr. Duncan.   Patient states needs ride, 20-25 minutes.  Pt needs oxy refill. Pended in separate encounter.  Pt qualifies for sickle cell standing order protocol.  If you do not hear from the infusion center by 2pm then you will not be able to get in for an infusion today.

## 2022-09-26 ENCOUNTER — INFUSION THERAPY VISIT (OUTPATIENT)
Dept: ONCOLOGY | Facility: CLINIC | Age: 22
End: 2022-09-26
Payer: COMMERCIAL

## 2022-09-26 ENCOUNTER — ONCOLOGY VISIT (OUTPATIENT)
Dept: ONCOLOGY | Facility: CLINIC | Age: 22
End: 2022-09-26
Attending: PEDIATRICS
Payer: COMMERCIAL

## 2022-09-26 ENCOUNTER — APPOINTMENT (OUTPATIENT)
Dept: LAB | Facility: CLINIC | Age: 22
End: 2022-09-26
Payer: COMMERCIAL

## 2022-09-26 VITALS
HEART RATE: 117 BPM | OXYGEN SATURATION: 100 % | DIASTOLIC BLOOD PRESSURE: 84 MMHG | SYSTOLIC BLOOD PRESSURE: 128 MMHG | TEMPERATURE: 97.4 F | BODY MASS INDEX: 27.94 KG/M2 | RESPIRATION RATE: 16 BRPM | WEIGHT: 162.8 LBS

## 2022-09-26 DIAGNOSIS — D57.1 HB-SS DISEASE WITHOUT CRISIS (H): Primary | ICD-10-CM

## 2022-09-26 DIAGNOSIS — D57.1 SICKLE CELL DISEASE (H): Primary | ICD-10-CM

## 2022-09-26 DIAGNOSIS — G81.10 SPASTIC HEMIPLEGIA, UNSPECIFIED ETIOLOGY, UNSPECIFIED LATERALITY (H): ICD-10-CM

## 2022-09-26 DIAGNOSIS — D57.00 SICKLE CELL PAIN CRISIS (H): ICD-10-CM

## 2022-09-26 DIAGNOSIS — D57.00 SICKLE CELL DISEASE WITH CRISIS (H): ICD-10-CM

## 2022-09-26 DIAGNOSIS — E83.111 IRON OVERLOAD DUE TO REPEATED RED BLOOD CELL TRANSFUSIONS: ICD-10-CM

## 2022-09-26 LAB
ALBUMIN SERPL BCG-MCNC: 4.3 G/DL (ref 3.5–5.2)
ALP SERPL-CCNC: 142 U/L (ref 35–104)
ALT SERPL W P-5'-P-CCNC: 58 U/L (ref 10–35)
ANION GAP SERPL CALCULATED.3IONS-SCNC: 12 MMOL/L (ref 7–15)
AST SERPL W P-5'-P-CCNC: ABNORMAL U/L
BASOPHILS # BLD AUTO: 0.2 10E3/UL (ref 0–0.2)
BASOPHILS NFR BLD AUTO: 1 %
BILIRUB SERPL-MCNC: 1.5 MG/DL
BUN SERPL-MCNC: 3.7 MG/DL (ref 6–20)
CALCIUM SERPL-MCNC: 9.3 MG/DL (ref 8.6–10)
CHLORIDE SERPL-SCNC: 106 MMOL/L (ref 98–107)
CREAT SERPL-MCNC: 0.45 MG/DL (ref 0.51–0.95)
DEPRECATED HCO3 PLAS-SCNC: 19 MMOL/L (ref 22–29)
EOSINOPHIL # BLD AUTO: 0.3 10E3/UL (ref 0–0.7)
EOSINOPHIL NFR BLD AUTO: 2 %
ERYTHROCYTE [DISTWIDTH] IN BLOOD BY AUTOMATED COUNT: 19.9 % (ref 10–15)
FERRITIN SERPL-MCNC: 5858 NG/ML (ref 6–175)
GFR SERPL CREATININE-BSD FRML MDRD: >90 ML/MIN/1.73M2
GLUCOSE SERPL-MCNC: 95 MG/DL (ref 70–99)
HCT VFR BLD AUTO: 27.4 % (ref 35–47)
HGB BLD-MCNC: 9.2 G/DL (ref 11.7–15.7)
IMM GRANULOCYTES # BLD: 0.1 10E3/UL
IMM GRANULOCYTES NFR BLD: 0 %
LYMPHOCYTES # BLD AUTO: 1.2 10E3/UL (ref 0.8–5.3)
LYMPHOCYTES NFR BLD AUTO: 7 %
MCH RBC QN AUTO: 29 PG (ref 26.5–33)
MCHC RBC AUTO-ENTMCNC: 33.6 G/DL (ref 31.5–36.5)
MCV RBC AUTO: 86 FL (ref 78–100)
MONOCYTES # BLD AUTO: 0.7 10E3/UL (ref 0–1.3)
MONOCYTES NFR BLD AUTO: 4 %
NEUTROPHILS # BLD AUTO: 14.5 10E3/UL (ref 1.6–8.3)
NEUTROPHILS NFR BLD AUTO: 86 %
NRBC # BLD AUTO: 0 10E3/UL
NRBC BLD AUTO-RTO: 0 /100
PLATELET # BLD AUTO: 733 10E3/UL (ref 150–450)
POTASSIUM SERPL-SCNC: 3.5 MMOL/L (ref 3.4–5.3)
PROT SERPL-MCNC: 8.4 G/DL (ref 6.4–8.3)
RBC # BLD AUTO: 3.17 10E6/UL (ref 3.8–5.2)
RETICS # AUTO: 0.16 10E6/UL (ref 0.03–0.1)
RETICS/RBC NFR AUTO: 5 % (ref 0.5–2)
SODIUM SERPL-SCNC: 137 MMOL/L (ref 136–145)
WBC # BLD AUTO: 16.8 10E3/UL (ref 4–11)

## 2022-09-26 PROCEDURE — 96376 TX/PRO/DX INJ SAME DRUG ADON: CPT

## 2022-09-26 PROCEDURE — 84155 ASSAY OF PROTEIN SERUM: CPT | Performed by: PEDIATRICS

## 2022-09-26 PROCEDURE — 85025 COMPLETE CBC W/AUTO DIFF WBC: CPT | Performed by: PEDIATRICS

## 2022-09-26 PROCEDURE — 258N000003 HC RX IP 258 OP 636: Performed by: PEDIATRICS

## 2022-09-26 PROCEDURE — 85045 AUTOMATED RETICULOCYTE COUNT: CPT | Performed by: PEDIATRICS

## 2022-09-26 PROCEDURE — 250N000011 HC RX IP 250 OP 636: Performed by: PEDIATRICS

## 2022-09-26 PROCEDURE — 82728 ASSAY OF FERRITIN: CPT | Performed by: PEDIATRICS

## 2022-09-26 PROCEDURE — 36592 COLLECT BLOOD FROM PICC: CPT | Performed by: PEDIATRICS

## 2022-09-26 PROCEDURE — 99215 OFFICE O/P EST HI 40 MIN: CPT | Mod: GC | Performed by: PEDIATRICS

## 2022-09-26 PROCEDURE — 96361 HYDRATE IV INFUSION ADD-ON: CPT

## 2022-09-26 PROCEDURE — G0463 HOSPITAL OUTPT CLINIC VISIT: HCPCS

## 2022-09-26 PROCEDURE — 96374 THER/PROPH/DIAG INJ IV PUSH: CPT

## 2022-09-26 RX ORDER — DIPHENHYDRAMINE HCL 25 MG
25 CAPSULE ORAL
Status: CANCELLED
Start: 2022-10-01

## 2022-09-26 RX ORDER — HEPARIN SODIUM (PORCINE) LOCK FLUSH IV SOLN 100 UNIT/ML 100 UNIT/ML
5 SOLUTION INTRAVENOUS ONCE
Status: COMPLETED | OUTPATIENT
Start: 2022-09-26 | End: 2022-09-26

## 2022-09-26 RX ORDER — MORPHINE SULFATE 2 MG/ML
2 INJECTION, SOLUTION INTRAMUSCULAR; INTRAVENOUS
Status: CANCELLED
Start: 2022-10-01

## 2022-09-26 RX ORDER — MORPHINE SULFATE 2 MG/ML
2 INJECTION, SOLUTION INTRAMUSCULAR; INTRAVENOUS
Status: COMPLETED | OUTPATIENT
Start: 2022-09-26 | End: 2022-09-26

## 2022-09-26 RX ORDER — HEPARIN SODIUM (PORCINE) LOCK FLUSH IV SOLN 100 UNIT/ML 100 UNIT/ML
5 SOLUTION INTRAVENOUS
Status: CANCELLED | OUTPATIENT
Start: 2022-10-01

## 2022-09-26 RX ORDER — HEPARIN SODIUM,PORCINE 10 UNIT/ML
5 VIAL (ML) INTRAVENOUS
Status: CANCELLED | OUTPATIENT
Start: 2022-10-01

## 2022-09-26 RX ORDER — ONDANSETRON 8 MG/1
8 TABLET, FILM COATED ORAL
Status: CANCELLED
Start: 2022-10-01

## 2022-09-26 RX ORDER — HEPARIN SODIUM (PORCINE) LOCK FLUSH IV SOLN 100 UNIT/ML 100 UNIT/ML
5 SOLUTION INTRAVENOUS
Status: DISCONTINUED | OUTPATIENT
Start: 2022-09-26 | End: 2022-09-26 | Stop reason: HOSPADM

## 2022-09-26 RX ADMIN — MORPHINE SULFATE 2 MG: 2 INJECTION, SOLUTION INTRAMUSCULAR; INTRAVENOUS at 15:04

## 2022-09-26 RX ADMIN — DEXTROSE AND SODIUM CHLORIDE 1000 ML: 5; 450 INJECTION, SOLUTION INTRAVENOUS at 13:02

## 2022-09-26 RX ADMIN — MORPHINE SULFATE 2 MG: 2 INJECTION, SOLUTION INTRAMUSCULAR; INTRAVENOUS at 14:00

## 2022-09-26 RX ADMIN — MORPHINE SULFATE 2 MG: 2 INJECTION, SOLUTION INTRAMUSCULAR; INTRAVENOUS at 13:02

## 2022-09-26 RX ADMIN — Medication 5 ML: at 11:03

## 2022-09-26 RX ADMIN — Medication 5 ML: at 15:23

## 2022-09-26 ASSESSMENT — PAIN SCALES - GENERAL: PAINLEVEL: EXTREME PAIN (8)

## 2022-09-26 NOTE — NURSING NOTE
"Oncology Rooming Note    September 26, 2022 11:41 AM   Jennifer Cervantes is a 22 year old female who presents for:    Chief Complaint   Patient presents with     Port Draw     Labs drawn via port by RN. Vitals taken.     Oncology Clinic Visit     Rtn for sickle cell anemia     Initial Vitals: /84 (BP Location: Right arm, Patient Position: Sitting, Cuff Size: Adult Regular)   Pulse 117   Temp 97.4  F (36.3  C) (Oral)   Resp 16   Wt 73.8 kg (162 lb 12.8 oz)   SpO2 100%   BMI 27.94 kg/m   Estimated body mass index is 27.94 kg/m  as calculated from the following:    Height as of 9/5/22: 1.626 m (5' 4\").    Weight as of this encounter: 73.8 kg (162 lb 12.8 oz). Body surface area is 1.83 meters squared.  Extreme Pain (8) Comment: Data Unavailable   No LMP recorded. Patient has had an injection.  Allergies reviewed: Yes  Medications reviewed: Yes    Medications: MEDICATION REFILLS NEEDED TODAY. Provider was notified.     Hydroxyurea  Aspirin  Jadenu  1st floor pharmacy    Pharmacy name entered into [facility]: [place], [place] - [address] [place] [address] [phone]    Clinical concerns: Patient has no specific questions or clinical concerns outside of the reason for the visit.       Yadi Welch, EMT            "

## 2022-09-26 NOTE — NURSING NOTE
"Chief Complaint   Patient presents with     Port Draw     Labs drawn via port by RN. Vitals taken.     Labs drawn via port by RN. Port accessed with 20G 3/4\" power needle. Flushed with NS and heparin. Pt tolerated well. Vitals taken. Pt checked in for next appointment.    Rin Mckeon, RN  "

## 2022-09-26 NOTE — LETTER
9/26/2022         RE: Jennifer Cervantes  [address]  [place] [address]        Dear Colleague,    Thank you for referring your patient, Jennifer Cervantes, to the [facility]. Please see a copy of my visit note below.        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 She had multiple thromboembolic events in 2021 despite adherent anticoagulation use (though warfarin was perpetually low) and there are concerns for chronic thromboembolic disease but did not have pulmonary HTN on a November 2021 cath. She is maintained on chronic PO opioids and twice-weekly infusion visits (since 1/24/22) but has been able to be maintained on this regimen and has stayed out of the ED most of the time with even rarer admissions.    Interval History:  Jennifer is in clinic today alone. She was recently admitted 9/5-9/13 for pain episode and acute chest syndrome requiring exchange transfusion 9/7. On initial presentation she was found to have acute hypoxic respiratory failure and chest pain. CXR showed infiltrate CT PE was negative for embolus, but again showed evidence of pneumonia. Prior to exchange transfusion she received 2 units pRBC and zosyn. She was transitioned from zosyn to vanc/ceftriaxone/azithryomycin. She completed a 7 day course of abx. She notes right arm and leg swelling while inpatient. RUE US was negative for DVT, but showed an indeterminate cystic lesion inferior to clavicle.    Since hospital discharge she  "has been well, did start having some pain in ribs and back this morning. She took pain medication this morning around 6 am with partial relief. She does not believe pain is significant enough to require hospitalization. She denies cough, shortness of breath, or chest pain currently. Besides current pain she states that she is back to her baseline. Previously her goal was to be off regular opioids by her 23th birthday in March. She is currently, unsure about this goal and would like to revisit this discussion at a later date.     She is tolerating the Jadenu, however is unhappy with the frequency of Desferal and would like to try something else that will not impact her quality of life as much. She is however willing to try desferal again if alternative therapies are ineffective. She is still saving money for a place, and describes wanting to save enough to have \"emergency funds\" to cover rent/other expenses in the event that a hospitalization limited her ability to work. Her goal is to move within the next 1 year. She has some anxiety about moving away from home, but says she is going to push herself to take this step.    Sickle Cell Disease Comprehensive Checklist    Bone Health/Avascular Necrosis Screening/Symptoms (each visit): no new concerns today    Leg Ulcer evaluation (every visit): none    Hypertension (every visit):stable, high normal    Last pulmonary evaluation (asthma, AMAN, pulm HTN): seeing 9/28/22    Stroke/silent cerebral infarct Hx (Y/N): Yes TIA ~2014, first event ~age 1 with full stroke and R sided weakness    Last PCP Visit: 8/2020    Vaccines:  ? PCV13: 5/13/19  ? Pneumovax (PPSV23): 3/04, 10/09, 7/12/19 (next due 7/2024)  ? Menactra: 4/2010, 9/2015 (MCV done 8/16/21)  ? Influenza: got 20-21 vaccine per self report    Audiology (chelation): done 6/2020, normal.. However, on 6/7, \"While there is no significant change in grade on the CTCAE4.03 Scale, there were hearing changes bilaterally for both " "standard and extended high frequency audiometry.  Will need to determine if an ENT consult is advised due to the asymmetry in extended high frequency testing.\"     Plan last reviewed with patient: 7/11/22    Patient background: 23 yo F, enjoys movies and kids though there are times where she does not really want to talk to people. Does not have a lot of social support at home.     Sickle Cell Disease History  Primary Hematologist Team: Jose Rafael Duncan  PCP: none  Genotype: SS  Acute Pain Crisis Treatment: (avoiding IV opioids for now, which she has agreed to)    ER   o Oxycodone 15-20 mg x1  o Ketamine 4mg IV x 2--helps with opioid sparing  o Toradol 15 mg IV x1   o Maintenance IV fluids with LR  o Other: Zofran 8 mg IV PRN nausea    Inpatient:  o Home oxycodone/Oxycontin regimen, though home oxycodone dosing could be increased to 20 mg to start  o PCA plan:   - None for now  o Other Medications: Zofran  o She has had success with ketamine starting at 4mg/h and advancing only to 6mg/h, as 8mg/h made her feel quite poorly..  o ASA  o Supportive Care: Docusate, Senna  Chronic Pain Medications:    Home regimen Oxycontin 10 mg q12h, oxycodone 15 mg p.o. q.4-6 hours p.r.n. breakthrough pain.  She also continues on Voltaren gel, and Zoloft among other medications.    -Also benefits from mental health visits, acupuncture  Baseline Hemoglobin: 7 g/dl without chronic transfusions  Hydroxyurea use: Yes  H/O blood transfusions: Yes, several (iron overload) Most recent 11/20/2021    H/O Transfusion Reactions: no    Antibodies:none  H/O Acute Chest Syndrome: Yes    Last episode:9/05/22 (previously 4/26/21, 10/2019)     ICU/intubation: not with 9/2022 admission  H/O Stroke: Yes (managed with chronic transfusions in the past, stopped late Spring 2020)  H/O VTE: Yes (2/2021)  H/O Cholecystectomy or Splenectomy: no  H/O Asthma, Pulm HTN, AVN, Leg Ulcers, Nephropathy, Retinopathy, etc: Iron overload, asthma, chronic lung disease, " physical limitations from early stroke    ---------------------------------------  Jennifer Cervantes's Goals (discussed 9/26/22)    1-3 month goal:      6 month goal:  Work on finding a job, Work on driving    12 month goal:  Move into her own place ([facility] or [place])    Disease-specific goal(s):  Continue to stay out of the hospital, be off regular opioids in the future (previous goal was March 2023, she is unsure of goal date currently)  ---------------------------------------      Current Outpatient Medications   Medication Sig Dispense Refill     acetaminophen (TYLENOL) 325 MG tablet Take 2 tablets (650 mg) by mouth every 6 hours as needed for mild pain (Patient not taking: No sig reported) 120 tablet 3     albuterol (PROVENTIL) (2.5 MG/3ML) 0.083% neb solution Take 2 vials (5 mg) by nebulization every 6 hours as needed for shortness of breath / dyspnea or wheezing 90 mL 3     aspirin (ASA) 81 MG chewable tablet Take 1 tablet (81 mg) by mouth 2 times daily 60 tablet 11     budesonide-formoterol (SYMBICORT) 160-4.5 MCG/ACT Inhaler Inhale 2 puffs into the lungs 2 times daily 10.2 g 3     deferasirox (JADENU) 360 MG tablet Take 4 tablets (1,440 mg) by mouth every evening 120 tablet 4     EPINEPHrine (ANY BX GENERIC EQUIV) 0.3 MG/0.3ML injection 2-pack Inject 0.3 mLs (0.3 mg) into the muscle as needed for anaphylaxis 1 each 1     Hydroxyurea 1000 MG TABS Take 3,000 mg by mouth daily 90 tablet 3     ondansetron (ZOFRAN) 8 MG tablet Take 1 tablet (8 mg) by mouth every 8 hours as needed 30 tablet 1     oxyCODONE IR (ROXICODONE) 15 MG tablet Take 1 tablet (15 mg) by mouth every 4 hours as needed for severe pain (7-10) Goal 4 per day. Max 6 per day. 40 tablet 0       Past Medical History  Past Medical History:   Diagnosis Date     Anxiety      Bleeding disorder (H)      Blood clotting disorder (H)      Cerebral infarction (H) 2015     Cognitive developmental delay     low IQ. Please recognize when managing pain and  planning with her     Depressive disorder      Hemiplegia and hemiparesis following cerebral infarction affecting right dominant side (H)     right hand contractures     Iron overload due to repeated red blood cell transfusions      Migraines      Multiple subsegmental pulmonary emboli without acute cor pulmonale (H) 02/01/2021     Oppositional defiant behavior      Superficial venous thrombosis of arm, right 03/25/2021     Uncomplicated asthma      Past Surgical History:   Procedure Laterality Date     AS INSERT TUNNELED CV 2 CATH W/O PORT/PUMP       CHOLECYSTECTOMY       CV RIGHT HEART CATH MEASUREMENTS RECORDED N/A 11/18/2021    Procedure: Right Heart Cath;  Surgeon: Jackson Stauffer MD;  Location:  HEART CARDIAC CATH LAB     INSERT PORT VASCULAR ACCESS Left 4/21/2021    Procedure: INSERTION, VASCULAR ACCESS PORT (NOT SURE ON SIDE UNTIL REMOVAL);  Surgeon: Rajan More MD;  Location: [facility] OR     IR CHEST PORT PLACEMENT > 5 YRS OF AGE  4/21/2021     IR CVC NON TUNNEL LINE REMOVAL  5/6/2021     IR CVC NON TUNNEL PLACEMENT > 5 YRS  04/07/2020     IR CVC NON TUNNEL PLACEMENT > 5 YRS  4/30/2021     IR CVC NON TUNNEL PLACEMENT > 5 YRS  9/7/2022     IR PORT REMOVAL LEFT  4/21/2021     REMOVE PORT VASCULAR ACCESS Left 4/21/2021    Procedure: REMOVAL, VASCULAR ACCESS PORT LEFT;  Surgeon: Rajan More MD;  Location: [facility] OR     REPAIR TENDON ELBOW Right 10/02/2019    Procedure: Right Elbow Flexor Lengthening, Flexor Pronator Slide Of Wrist and Finger, Thumb Adductor Lengthening;  Surgeon: Anai Franco MD;  Location: [facility] OR     TONSILLECTOMY Bilateral 10/02/2019    Procedure: Bilateral Tonsillectomy;  Surgeon: Farhana Guy MD;  Location: [facility] OR     ZZC BREAST REDUCTION (INCLUDES LIPO) TIER 3 Bilateral 04/23/2019     Allergies   Allergen Reactions     Contrast Dye      Hives and breathing issues     Fish-Derived Products Hives     Seafood Hives     Diagnostic X-Ray Materials      Gadolinium       Social History   Social History     Tobacco Use     Smoking status: Never Smoker     Smokeless tobacco: Never Used   Substance Use Topics     Alcohol use: Not Currently     Alcohol/week: 0.0 standard drinks     Drug use: Never    Still living at home with mom and extended family.     Past medical history and social history were reviewed.    Physical Examination:  /84 (BP Location: Right arm, Patient Position: Sitting, Cuff Size: Adult Regular)   Pulse 117   Temp 97.4  F (36.3  C) (Oral)   Resp 16   Wt 73.8 kg (162 lb 12.8 oz)   SpO2 100%   BMI 27.94 kg/m    Wt Readings from Last 10 Encounters:   09/26/22 73.8 kg (162 lb 12.8 oz)   09/11/22 83.2 kg (183 lb 8 oz)   09/05/22 76 kg (167 lb 9.6 oz)   08/29/22 76 kg (167 lb 9.6 oz)   08/20/22 77.6 kg (171 lb)   07/28/22 75.3 kg (166 lb)   07/15/22 77.1 kg (170 lb)   07/11/22 76.9 kg (169 lb 8 oz)   06/26/22 76.7 kg (169 lb)   06/20/22 76.8 kg (169 lb 6.4 oz)     General: laying down, but quickly sits up to participate in interview, bright disposition  Eyes: EOMI, . No significant scleral icterus.  Skin: no bruising noted on exposed skin. Port site c/d/i, circular area of hyperpigmentation left chest wall directly inferior to clavicle,  Respiratory:  Normal respiratory effort. Lungs clear to auscultation.  Cardiac: RRR, normal rhythm. No murmur.  Neurologic:Speech fluent, mentation intact. Contractured right hand 2/2 stroke history, mild antalgic gait due to contractures in foot      Laboratory Data:   Latest Reference Range & Units 09/26/22 11:10   Sodium 136 - 145 mmol/L 137   Potassium 3.4 - 5.3 mmol/L 3.5   Chloride 98 - 107 mmol/L 106   Carbon Dioxide (CO2) 22 - 29 mmol/L 19 (L)   Urea Nitrogen 6.0 - 20.0 mg/dL 3.7 (L)   Creatinine 0.51 - 0.95 mg/dL 0.45 (L)   GFR Estimate >60 mL/min/1.73m2 >90   Calcium 8.6 - 10.0 mg/dL 9.3   Anion Gap 7 - 15 mmol/L 12   Albumin 3.5 - 5.2 g/dL 4.3   Protein Total 6.4 - 8.3 g/dL 8.4 (H)   Alkaline Phosphatase 35 - 104  U/L 142 (H)   ALT 10 - 35 U/L 58 (H)   AST  See Comment   Bilirubin Total <=1.2 mg/dL 1.5 (H)   Glucose 70 - 99 mg/dL 95   WBC 4.0 - 11.0 10e3/uL 16.8 (H)   Hemoglobin 11.7 - 15.7 g/dL 9.2 (L)   Hematocrit 35.0 - 47.0 % 27.4 (L)   Platelet Count 150 - 450 10e3/uL 733 (H)   RBC Count 3.80 - 5.20 10e6/uL 3.17 (L)   MCV 78 - 100 fL 86   MCH 26.5 - 33.0 pg 29.0   MCHC 31.5 - 36.5 g/dL 33.6   RDW 10.0 - 15.0 % 19.9 (H)   % Neutrophils % 86   % Lymphocytes % 7   % Monocytes % 4   % Eosinophils % 2   % Basophils % 1   Absolute Basophils 0.0 - 0.2 10e3/uL 0.2   Absolute Eosinophils 0.0 - 0.7 10e3/uL 0.3   Absolute Immature Granulocytes <=0.4 10e3/uL 0.1   Absolute Lymphocytes 0.8 - 5.3 10e3/uL 1.2   Absolute Monocytes 0.0 - 1.3 10e3/uL 0.7   % Immature Granulocytes % 0   Absolute Neutrophils 1.6 - 8.3 10e3/uL 14.5 (H)   Absolute NRBCs 10e3/uL 0.0   NRBCs per 100 WBC <1 /100 0   % Retic 0.5 - 2.0 % 5.0 (H)   Absolute Retic 0.025 - 0.095 10e6/uL 0.159 (H)   (L): Data is abnormally low  (H): Data is abnormally high     Latest Reference Range & Units 05/26/21 11:37 06/18/21 11:20 07/26/21 12:39 09/17/21 05:13 10/18/21 12:03 12/20/21 14:06 03/21/22 10:35 06/06/22 10:18 07/11/22 11:43 08/29/22 11:30 09/26/22 11:10   Ferritin 6 - 175 ng/mL 4,586 (H) 8,923 (H) 10,384 (H) 9,788 (H) 8,023 (H) 6,016 (H) 4,156 (H) 7,110 (H) 5,623 (H) 5,635 (H) 5,858 (H)   (H): Data is abnormally high    Most recent labs reviewed and interpreted by me today.      Assessment and Plan:  1. Sickle Cell HgbSS Disease  2. Recent hospitalization with acute chest (9/2022)  3. Chronic Pain  4. 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 History of CVA  7. Hearing loss    Overall Jennifer seems to be recovering well from her recent hospitalization for pain and acute chest syndrome. She is having pain today but she notes some improvement in pain with home regimen and she continues to be interested in slow weaning  down. She states that respiratory symptoms have fully resolved and her breathing has returned to baseline. She would like to discuss opioid wean at next visit. She is due for follow up with pulmonology this week; last visit was 12/2021.    Currently, she would like to hold Desferal because of frequency of infusions. She states she would like to try another therapy, but is willing to switch back to Desferal if needed. She is due for repeat Ferriscan.    Again discussed plans to live independently of her family. We will support as we can.     Finally, she shared a recent story of not being trusted in regards to her pain while in the Emergency Department recently. She requested our insights and considerations for improvement.    Plan:  -Continue Hydrea to 3000mg daily to help lessen frequency of sickle cell pain  -Continue Oxycodone at home but decreasing Rx to 40 tabs/week. She can self-reduce infusion days/week but continue to  keep the cap at 2/week for now  -Infusion center visits limited to two times per week (Mondays and Fridays).May consider reduction in IVF given concerns for right upper and lower extremity edema. Continue diligent home management with current medications, heat, rest, compression, warm baths.   -If unable to manage at home can go to ED but continue to not do IV narcotics in the emergency room. Ketamine previously added to pain plan in ED  -Continue deferasirox, will discontinue deferoxamine infusions now. Ferriscan ordered for October and adding Deferiprone.  - Follow up with pulmonology  -Continue aspirin BID.   -RTC in ~4 weeks .    45 minutes spent on the date of the encounter doing chart review, review of test results, patient visit and documentation     Patient was seen by and discussed with attending physician Dr. Duncan who agrees with assessment and plan.     Akhil Gutiérrez MD  Hematology Fellow      I, Eric Duncan MD, saw this patient with the fellow and agree with the fellow's  finding and plan of care as documented. I personally reviewed vital signs, medications, and labs and performed a pertinent physical exam. Key findings and additional documentation were highlighted in bold.      Eric Duncan MD  Classical Hematologist  Division of Hematology, Oncology, and Transplantation  [facility]  [facility]  Pager: [phone]      Review of the result(s) of each unique test - labs as above  Diagnosis or treatment significantly limited by social determinants of health - stressors at home, complex medical care needs and difficulty with transportation, poverty  Ordering of each unique test  Prescription drug management

## 2022-09-26 NOTE — PROGRESS NOTES
Infusion Nursing Note:  Jennifer Cervantes presents today for IV fluids and pain medication.    Patient seen by provider today: Yes: Dr. Duncan   present during visit today: Not Applicable.    Note: Pt presents today with 9/10 back and left rib pain. 2mg IV morphine administered x3 per therapy plan. Pt reports pain improved to 4/10.    Intravenous Access:  Implanted Port.    Treatment Conditions:  Lab Results   Component Value Date    HGB 9.2 (L) 09/26/2022    WBC 16.8 (H) 09/26/2022    ANEU 10.2 (H) 06/26/2022    ANEUTAUTO 14.5 (H) 09/26/2022     (H) 09/26/2022      Lab Results   Component Value Date     09/26/2022    POTASSIUM 3.5 09/26/2022    MAG 2.0 11/11/2021    CR 0.45 (L) 09/26/2022    MICAH 9.3 09/26/2022    BILITOTAL 1.5 (H) 09/26/2022    ALBUMIN 4.3 09/26/2022    ALT 58 (H) 09/26/2022    AST  09/26/2022      Comment:      Unsatisfactory specimen - hemolyzed    Specimen is hemolyzed which can falsely elevate AST. Analysis of a non-hemolyzed specimen may result in a lower value.     Results reviewed, labs MET treatment parameters, ok to proceed with treatment. Per Dr. Duncan, no need to redraw AST.    Post Infusion Assessment:  Patient tolerated infusion without incident.  Blood return noted pre and post infusion.  Site patent and intact, free from redness, edema or discomfort.  No evidence of extravasations.  Access discontinued per protocol.     Discharge Plan:   Patient declined prescription refills.  Discharge instructions reviewed with: Patient.  Patient and/or family verbalized understanding of discharge instructions and all questions answered.  AVS to patient via [facility].  Patient will return 09/28/22 for next appointment.   Patient discharged in stable condition accompanied by: self.  Departure Mode: Ambulatory.      Emily Meese, RN

## 2022-09-26 NOTE — PATIENT INSTRUCTIONS
Ange Triage and after hours / weekends / holidays:  [phone]    Please call the triage or after hours line if you experience a temperature greater than or equal to 100.4, shaking chills, have uncontrolled nausea, vomiting and/or diarrhea, dizziness, shortness of breath, chest pain, bleeding, unexplained bruising, or if you have any other new/concerning symptoms, questions or concerns.      If you are having any concerning symptoms or wish to speak to a provider before your next infusion visit, please call your care coordinator or triage to notify them so we can adequately serve you.     If you need a refill on a narcotic prescription or other medication, please call before your infusion appointment.                September 2022 Sunday Monday Tuesday Wednesday Thursday Friday Saturday                       1     2     3       4     5    Admission   6:14 AM   Claire Perez MD   [facility]   (Discharge: 9/5/2022)    Admission   9:25 PM   Lanre Royal MD   [facility] Unit [address] [place] [address]   (Discharge: 9/13/2022)    US LWR EXT VENOUS DUPLEX BILAT  10:10 PM   (40 min.)   [id number]   [facility]    XR CHEST PORT 1 VIEW  10:30 PM   (20 min.)   [id number]   [facility] 6     7    LAB   8:45 AM   (15 min.)   [facility]   [facility] NURSE VISIT   9:00 AM   (30 min.)   Nurse, Mildred Pcc   [facility] [place]    IR NON TUNNL CV CATH PLACEMNT   1:00 PM   (60 min.)   [id number]   [facility] Interventional Radiology    RED BLOOD CELL EXCHANGE   2:00 PM   (200 min.)   Rn2, Uu Apheresis   [facility] [place] [facility] 8     9    CT CHEST PULMONARY EMBOLISM W   1:45 AM   (20 min.)   UUCT4   [facility]    ECHO LIMITED   1:50 PM   (60 min.)   [id number]   [facility] 10       11     12    US  VENOUS  10:10 AM   (30 min.)   UUUS2   [facility] 13     14     15    ONC INFUSION 2 HR (120 MIN)   1:30 PM   (120 min.)    ONC INFUSION NURSE   [facility] 16    BMT INFUSION 2 HR (120 MIN)  11:30 AM   (120 min.)    BMT INFUSION NURSE   [facility] 17       18     19    ONC INFUSION 2 HR (120 MIN)   9:00 AM   (120 min.)    ONC INFUSION NURSE   [facility] 20     21     22    BMT INFUSION 2 HR (120 MIN)   9:30 AM   (120 min.)    BMT INFUSION NURSE   [facility] 23     24       25     26    LAB PERIPHERAL  11:30 AM   (15 min.)   [facility] LAB DRAW   [facility]    RETURN  11:45 AM   (30 min.)   Eric Duncan MD   [facility]    ONC INFUSION 2 HR (120 MIN)   1:00 PM   (120 min.)    ONC INFUSION NURSE   [facility] 27     28    LAB   8:45 AM   (15 min.)    LAB   [facility] PHYSICAL   9:15 AM   (30 min.)   Suraj Case MD   [facility] PFT   3:15 PM   (30 min.)    PFL D   [facility] Pulmonary Function Testing [facility] RETURN   4:00 PM   (40 min.)   Rosalva Gasca MD   [facility] 29 30 October 2022 Sunday Monday Tuesday Wednesday Thursday Friday Saturday                                 1       2     3     4     5     6     7     8       9     10     11     12    RETURN   1:45 PM   (60 min.)   Katherine Pierce MD   [facility] 13     14     15       16     17     18     19     20     21     22       23     24     25     26     27     28     29       30     31                                                 Lab Results:  Recent Results (from the past 12 hour(s))   Comprehensive metabolic panel    Collection Time: 09/26/22 11:10 AM   Result Value Ref Range    Sodium 137 136 - 145 mmol/L    Potassium 3.5 3.4 - 5.3 mmol/L    Chloride 106 98 - 107 mmol/L    Carbon Dioxide (CO2) 19 (L) 22 - 29 mmol/L    Anion Gap 12 7 - 15 mmol/L    Urea Nitrogen 3.7 (L) 6.0 - 20.0 mg/dL    Creatinine 0.45 (L) 0.51 - 0.95 mg/dL    Calcium 9.3 8.6 - 10.0 mg/dL    Glucose 95 70 - 99 mg/dL    Alkaline Phosphatase 142 (H) 35 - 104 U/L    AST      ALT 58 (H) 10 - 35 U/L    Protein Total 8.4 (H) 6.4 - 8.3 g/dL    Albumin 4.3 3.5 - 5.2 g/dL    Bilirubin Total 1.5 (H) <=1.2 mg/dL    GFR Estimate >90 >60 mL/min/1.73m2   Ferritin    Collection Time: 09/26/22 11:10 AM   Result Value Ref Range    Ferritin 5,858 (H) 6 - 175 ng/mL   Reticulocyte count    Collection Time: 09/26/22 11:10 AM   Result Value Ref Range    % Reticulocyte 5.0 (H) 0.5 - 2.0 %    Absolute Reticulocyte 0.159 (H) 0.025 - 0.095 10e6/uL   CBC with platelets and differential    Collection Time: 09/26/22 11:10 AM   Result Value Ref Range    WBC Count 16.8 (H) 4.0 - 11.0 10e3/uL    RBC Count 3.17 (L) 3.80 - 5.20 10e6/uL    Hemoglobin 9.2 (L) 11.7 - 15.7 g/dL    Hematocrit 27.4 (L) 35.0 - 47.0 %    MCV 86 78 - 100 fL    MCH 29.0 26.5 - 33.0 pg    MCHC 33.6 31.5 - 36.5 g/dL    RDW 19.9 (H) 10.0 - 15.0 %    Platelet Count 733 (H) 150 - 450 10e3/uL    % Neutrophils 86 %    % Lymphocytes 7 %    % Monocytes 4 %    % Eosinophils 2 %    % Basophils 1 %    % Immature Granulocytes 0 %    NRBCs per 100 WBC 0 <1 /100    Absolute Neutrophils 14.5 (H) 1.6 - 8.3 10e3/uL    Absolute Lymphocytes 1.2 0.8 - 5.3 10e3/uL    Absolute Monocytes 0.7 0.0 - 1.3 10e3/uL    Absolute Eosinophils 0.3 0.0 - 0.7 10e3/uL    Absolute Basophils 0.2 0.0 - 0.2 10e3/uL    Absolute Immature Granulocytes 0.1 <=0.4 10e3/uL    Absolute NRBCs 0.0 10e3/uL

## 2022-09-26 NOTE — PROGRESS NOTES
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 She had multiple thromboembolic events in 2021 despite adherent anticoagulation use (though warfarin was perpetually low) and there are concerns for chronic thromboembolic disease but did not have pulmonary HTN on a November 2021 cath. She is maintained on chronic PO opioids and twice-weekly infusion visits (since 1/24/22) but has been able to be maintained on this regimen and has stayed out of the ED most of the time with even rarer admissions.    Interval History:  Jennifer is in clinic today alone. She was recently admitted 9/5-9/13 for pain episode and acute chest syndrome requiring exchange transfusion 9/7. On initial presentation she was found to have acute hypoxic respiratory failure and chest pain. CXR showed infiltrate CT PE was negative for embolus, but again showed evidence of pneumonia. Prior to exchange transfusion she received 2 units pRBC and zosyn. She was transitioned from zosyn to vanc/ceftriaxone/azithryomycin. She completed a 7 day course of abx. She notes right arm and leg swelling while inpatient. RUE US was negative for DVT, but showed an indeterminate cystic lesion inferior to clavicle.    Since hospital discharge she has been well, did start having some pain in ribs and back this morning. She took pain medication this morning around 6 am with partial relief. She does not believe pain is significant enough to require hospitalization. She denies cough, shortness of  "breath, or chest pain currently. Besides current pain she states that she is back to her baseline. Previously her goal was to be off regular opioids by her 23th birthday in March. She is currently, unsure about this goal and would like to revisit this discussion at a later date.     She is tolerating the Jadenu, however is unhappy with the frequency of Desferal and would like to try something else that will not impact her quality of life as much. She is however willing to try desferal again if alternative therapies are ineffective. She is still saving money for a place, and describes wanting to save enough to have \"emergency funds\" to cover rent/other expenses in the event that a hospitalization limited her ability to work. Her goal is to move within the next 1 year. She has some anxiety about moving away from home, but says she is going to push herself to take this step.    Sickle Cell Disease Comprehensive Checklist    Bone Health/Avascular Necrosis Screening/Symptoms (each visit): no new concerns today    Leg Ulcer evaluation (every visit): none    Hypertension (every visit):stable, high normal    Last pulmonary evaluation (asthma, AMAN, pulm HTN): seeing 9/28/22    Stroke/silent cerebral infarct Hx (Y/N): Yes TIA ~2014, first event ~age 1 with full stroke and R sided weakness    Last PCP Visit: 8/2020    Vaccines:  ? PCV13: 5/13/19  ? Pneumovax (PPSV23): 3/04, 10/09, 7/12/19 (next due 7/2024)  ? Menactra: 4/2010, 9/2015 (MCV done 8/16/21)  ? Influenza: got 20-21 vaccine per self report    Audiology (chelation): done 6/2020, normal.. However, on 6/7, \"While there is no significant change in grade on the CTCAE4.03 Scale, there were hearing changes bilaterally for both standard and extended high frequency audiometry.  Will need to determine if an ENT consult is advised due to the asymmetry in extended high frequency testing.\"     Plan last reviewed with patient: 7/11/22    Patient background: 23 yo F, enjoys movies " and kids though there are times where she does not really want to talk to people. Does not have a lot of social support at home.     Sickle Cell Disease History  Primary Hematologist Team: Jose Rafael Duncan  PCP: none  Genotype: SS  Acute Pain Crisis Treatment: (avoiding IV opioids for now, which she has agreed to)    ER   o Oxycodone 15-20 mg x1  o Ketamine 4mg IV x 2--helps with opioid sparing  o Toradol 15 mg IV x1   o Maintenance IV fluids with LR  o Other: Zofran 8 mg IV PRN nausea    Inpatient:  o Home oxycodone/Oxycontin regimen, though home oxycodone dosing could be increased to 20 mg to start  o PCA plan:   - None for now  o Other Medications: Zofran  o She has had success with ketamine starting at 4mg/h and advancing only to 6mg/h, as 8mg/h made her feel quite poorly..  o ASA  o Supportive Care: Docusate, Senna  Chronic Pain Medications:    Home regimen Oxycontin 10 mg q12h, oxycodone 15 mg p.o. q.4-6 hours p.r.n. breakthrough pain.  She also continues on Voltaren gel, and Zoloft among other medications.    -Also benefits from mental health visits, acupuncture  Baseline Hemoglobin: 7 g/dl without chronic transfusions  Hydroxyurea use: Yes  H/O blood transfusions: Yes, several (iron overload) Most recent 11/20/2021    H/O Transfusion Reactions: no    Antibodies:none  H/O Acute Chest Syndrome: Yes    Last episode:9/05/22 (previously 4/26/21, 10/2019)     ICU/intubation: not with 9/2022 admission  H/O Stroke: Yes (managed with chronic transfusions in the past, stopped late Spring 2020)  H/O VTE: Yes (2/2021)  H/O Cholecystectomy or Splenectomy: no  H/O Asthma, Pulm HTN, AVN, Leg Ulcers, Nephropathy, Retinopathy, etc: Iron overload, asthma, chronic lung disease, physical limitations from early stroke    ---------------------------------------  Jennifer Cervantes's Goals (discussed 9/26/22)    1-3 month goal:      6 month goal:  Work on finding a job, Work on driving    12 month goal:  Move into her own place  ([facility], [place] or [place])    Disease-specific goal(s):  Continue to stay out of the hospital, be off regular opioids in the future (previous goal was March 2023, she is unsure of goal date currently)  ---------------------------------------      Current Outpatient Medications   Medication Sig Dispense Refill     acetaminophen (TYLENOL) 325 MG tablet Take 2 tablets (650 mg) by mouth every 6 hours as needed for mild pain (Patient not taking: No sig reported) 120 tablet 3     albuterol (PROVENTIL) (2.5 MG/3ML) 0.083% neb solution Take 2 vials (5 mg) by nebulization every 6 hours as needed for shortness of breath / dyspnea or wheezing 90 mL 3     aspirin (ASA) 81 MG chewable tablet Take 1 tablet (81 mg) by mouth 2 times daily 60 tablet 11     budesonide-formoterol (SYMBICORT) 160-4.5 MCG/ACT Inhaler Inhale 2 puffs into the lungs 2 times daily 10.2 g 3     deferasirox (JADENU) 360 MG tablet Take 4 tablets (1,440 mg) by mouth every evening 120 tablet 4     EPINEPHrine (ANY BX GENERIC EQUIV) 0.3 MG/0.3ML injection 2-pack Inject 0.3 mLs (0.3 mg) into the muscle as needed for anaphylaxis 1 each 1     Hydroxyurea 1000 MG TABS Take 3,000 mg by mouth daily 90 tablet 3     ondansetron (ZOFRAN) 8 MG tablet Take 1 tablet (8 mg) by mouth every 8 hours as needed 30 tablet 1     oxyCODONE IR (ROXICODONE) 15 MG tablet Take 1 tablet (15 mg) by mouth every 4 hours as needed for severe pain (7-10) Goal 4 per day. Max 6 per day. 40 tablet 0       Past Medical History  Past Medical History:   Diagnosis Date     Anxiety      Bleeding disorder (H)      Blood clotting disorder (H)      Cerebral infarction (H) 2015     Cognitive developmental delay     low IQ. Please recognize when managing pain and planning with her     Depressive disorder      Hemiplegia and hemiparesis following cerebral infarction affecting right dominant side (H)     right hand contractures     Iron overload due to repeated red blood cell transfusions      Migraines       Multiple subsegmental pulmonary emboli without acute cor pulmonale (H) 02/01/2021     Oppositional defiant behavior      Superficial venous thrombosis of arm, right 03/25/2021     Uncomplicated asthma      Past Surgical History:   Procedure Laterality Date     AS INSERT TUNNELED CV 2 CATH W/O PORT/PUMP       CHOLECYSTECTOMY       CV RIGHT HEART CATH MEASUREMENTS RECORDED N/A 11/18/2021    Procedure: Right Heart Cath;  Surgeon: Jackson Stauffer MD;  Location:  HEART CARDIAC CATH LAB     INSERT PORT VASCULAR ACCESS Left 4/21/2021    Procedure: INSERTION, VASCULAR ACCESS PORT (NOT SURE ON SIDE UNTIL REMOVAL);  Surgeon: Rajan More MD;  Location: [facility] OR     IR CHEST PORT PLACEMENT > 5 YRS OF AGE  4/21/2021     IR CVC NON TUNNEL LINE REMOVAL  5/6/2021     IR CVC NON TUNNEL PLACEMENT > 5 YRS  04/07/2020     IR CVC NON TUNNEL PLACEMENT > 5 YRS  4/30/2021     IR CVC NON TUNNEL PLACEMENT > 5 YRS  9/7/2022     IR PORT REMOVAL LEFT  4/21/2021     REMOVE PORT VASCULAR ACCESS Left 4/21/2021    Procedure: REMOVAL, VASCULAR ACCESS PORT LEFT;  Surgeon: Rajan More MD;  Location: [facility] OR     REPAIR TENDON ELBOW Right 10/02/2019    Procedure: Right Elbow Flexor Lengthening, Flexor Pronator Slide Of Wrist and Finger, Thumb Adductor Lengthening;  Surgeon: Anai Franco MD;  Location: [facility] OR     TONSILLECTOMY Bilateral 10/02/2019    Procedure: Bilateral Tonsillectomy;  Surgeon: Farhana Guy MD;  Location: [facility] OR     ZZC BREAST REDUCTION (INCLUDES LIPO) TIER 3 Bilateral 04/23/2019     Allergies   Allergen Reactions     Contrast Dye      Hives and breathing issues     Fish-Derived Products Hives     Seafood Hives     Diagnostic X-Ray Materials      Gadolinium      Social History   Social History     Tobacco Use     Smoking status: Never Smoker     Smokeless tobacco: Never Used   Substance Use Topics     Alcohol use: Not Currently     Alcohol/week: 0.0 standard drinks     Drug use: Never    Still living at  home with mom and extended family.     Past medical history and social history were reviewed.    Physical Examination:  /84 (BP Location: Right arm, Patient Position: Sitting, Cuff Size: Adult Regular)   Pulse 117   Temp 97.4  F (36.3  C) (Oral)   Resp 16   Wt 73.8 kg (162 lb 12.8 oz)   SpO2 100%   BMI 27.94 kg/m    Wt Readings from Last 10 Encounters:   09/26/22 73.8 kg (162 lb 12.8 oz)   09/11/22 83.2 kg (183 lb 8 oz)   09/05/22 76 kg (167 lb 9.6 oz)   08/29/22 76 kg (167 lb 9.6 oz)   08/20/22 77.6 kg (171 lb)   07/28/22 75.3 kg (166 lb)   07/15/22 77.1 kg (170 lb)   07/11/22 76.9 kg (169 lb 8 oz)   06/26/22 76.7 kg (169 lb)   06/20/22 76.8 kg (169 lb 6.4 oz)     General: laying down, but quickly sits up to participate in interview, bright disposition  Eyes: EOMI, . No significant scleral icterus.  Skin: no bruising noted on exposed skin. Port site c/d/i, circular area of hyperpigmentation left chest wall directly inferior to clavicle,  Respiratory:  Normal respiratory effort. Lungs clear to auscultation.  Cardiac: RRR, normal rhythm. No murmur.  Neurologic:Speech fluent, mentation intact. Contractured right hand 2/2 stroke history, mild antalgic gait due to contractures in foot      Laboratory Data:   Latest Reference Range & Units 09/26/22 11:10   Sodium 136 - 145 mmol/L 137   Potassium 3.4 - 5.3 mmol/L 3.5   Chloride 98 - 107 mmol/L 106   Carbon Dioxide (CO2) 22 - 29 mmol/L 19 (L)   Urea Nitrogen 6.0 - 20.0 mg/dL 3.7 (L)   Creatinine 0.51 - 0.95 mg/dL 0.45 (L)   GFR Estimate >60 mL/min/1.73m2 >90   Calcium 8.6 - 10.0 mg/dL 9.3   Anion Gap 7 - 15 mmol/L 12   Albumin 3.5 - 5.2 g/dL 4.3   Protein Total 6.4 - 8.3 g/dL 8.4 (H)   Alkaline Phosphatase 35 - 104 U/L 142 (H)   ALT 10 - 35 U/L 58 (H)   AST  See Comment   Bilirubin Total <=1.2 mg/dL 1.5 (H)   Glucose 70 - 99 mg/dL 95   WBC 4.0 - 11.0 10e3/uL 16.8 (H)   Hemoglobin 11.7 - 15.7 g/dL 9.2 (L)   Hematocrit 35.0 - 47.0 % 27.4 (L)   Platelet Count 150  - 450 10e3/uL 733 (H)   RBC Count 3.80 - 5.20 10e6/uL 3.17 (L)   MCV 78 - 100 fL 86   MCH 26.5 - 33.0 pg 29.0   MCHC 31.5 - 36.5 g/dL 33.6   RDW 10.0 - 15.0 % 19.9 (H)   % Neutrophils % 86   % Lymphocytes % 7   % Monocytes % 4   % Eosinophils % 2   % Basophils % 1   Absolute Basophils 0.0 - 0.2 10e3/uL 0.2   Absolute Eosinophils 0.0 - 0.7 10e3/uL 0.3   Absolute Immature Granulocytes <=0.4 10e3/uL 0.1   Absolute Lymphocytes 0.8 - 5.3 10e3/uL 1.2   Absolute Monocytes 0.0 - 1.3 10e3/uL 0.7   % Immature Granulocytes % 0   Absolute Neutrophils 1.6 - 8.3 10e3/uL 14.5 (H)   Absolute NRBCs 10e3/uL 0.0   NRBCs per 100 WBC <1 /100 0   % Retic 0.5 - 2.0 % 5.0 (H)   Absolute Retic 0.025 - 0.095 10e6/uL 0.159 (H)   (L): Data is abnormally low  (H): Data is abnormally high     Latest Reference Range & Units 05/26/21 11:37 06/18/21 11:20 07/26/21 12:39 09/17/21 05:13 10/18/21 12:03 12/20/21 14:06 03/21/22 10:35 06/06/22 10:18 07/11/22 11:43 08/29/22 11:30 09/26/22 11:10   Ferritin 6 - 175 ng/mL 4,586 (H) 8,923 (H) 10,384 (H) 9,788 (H) 8,023 (H) 6,016 (H) 4,156 (H) 7,110 (H) 5,623 (H) 5,635 (H) 5,858 (H)   (H): Data is abnormally high    Most recent labs reviewed and interpreted by me today.      Assessment and Plan:  1. Sickle Cell HgbSS Disease  2. Recent hospitalization with acute chest (9/2022)  3. Chronic Pain  4. 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 History of CVA  7. Hearing loss    Overall Jennifer seems to be recovering well from her recent hospitalization for pain and acute chest syndrome. She is having pain today but she notes some improvement in pain with home regimen and she continues to be interested in slow weaning down. She states that respiratory symptoms have fully resolved and her breathing has returned to baseline. She would like to discuss opioid wean at next visit. She is due for follow up with pulmonology this week; last visit was 12/2021.    Currently,  she would like to hold Desferal because of frequency of infusions. She states she would like to try another therapy, but is willing to switch back to Desferal if needed. She is due for repeat Ferriscan.    Again discussed plans to live independently of her family. We will support as we can.     Finally, she shared a recent story of not being trusted in regards to her pain while in the Emergency Department recently. She requested our insights and considerations for improvement.    Plan:  -Continue Hydrea to 3000mg daily to help lessen frequency of sickle cell pain  -Continue Oxycodone at home but decreasing Rx to 40 tabs/week. She can self-reduce infusion days/week but continue to  keep the cap at 2/week for now  -Infusion center visits limited to two times per week (Mondays and Fridays).May consider reduction in IVF given concerns for right upper and lower extremity edema. Continue diligent home management with current medications, heat, rest, compression, warm baths.   -If unable to manage at home can go to ED but continue to not do IV narcotics in the emergency room. Ketamine previously added to pain plan in ED  -Continue deferasirox, will discontinue deferoxamine infusions now. Ferriscan ordered for October and adding Deferiprone.  - Follow up with pulmonology  -Continue aspirin BID.   -RTC in ~4 weeks .    45 minutes spent on the date of the encounter doing chart review, review of test results, patient visit and documentation     Patient was seen by and discussed with attending physician Dr. Duncan who agrees with assessment and plan.     Akhil Gutiérrez MD  Hematology Fellow      I, Eric Duncan MD, saw this patient with the fellow and agree with the fellow's finding and plan of care as documented. I personally reviewed vital signs, medications, and labs and performed a pertinent physical exam. Key findings and additional documentation were highlighted in bold.      Eric Duncan MD  Classical  Hematologist  Division of Hematology, Oncology, and Transplantation  [facility]  [facility]  Pager: [phone]      Review of the result(s) of each unique test - labs as above  Diagnosis or treatment significantly limited by social determinants of health - stressors at home, complex medical care needs and difficulty with transportation, poverty  Ordering of each unique test  Prescription drug management

## 2022-09-28 ENCOUNTER — OFFICE VISIT (OUTPATIENT)
Dept: INTERNAL MEDICINE | Facility: CLINIC | Age: 22
End: 2022-09-28
Payer: COMMERCIAL

## 2022-09-28 ENCOUNTER — TELEPHONE (OUTPATIENT)
Dept: ONCOLOGY | Facility: CLINIC | Age: 22
End: 2022-09-28

## 2022-09-28 ENCOUNTER — LAB (OUTPATIENT)
Dept: LAB | Facility: CLINIC | Age: 22
End: 2022-09-28
Attending: PEDIATRICS

## 2022-09-28 ENCOUNTER — OFFICE VISIT (OUTPATIENT)
Dept: PULMONOLOGY | Facility: CLINIC | Age: 22
End: 2022-09-28
Attending: REGISTERED NURSE
Payer: COMMERCIAL

## 2022-09-28 VITALS
WEIGHT: 163 LBS | HEART RATE: 106 BPM | BODY MASS INDEX: 27.83 KG/M2 | DIASTOLIC BLOOD PRESSURE: 76 MMHG | HEIGHT: 64 IN | SYSTOLIC BLOOD PRESSURE: 125 MMHG

## 2022-09-28 VITALS
HEIGHT: 64 IN | SYSTOLIC BLOOD PRESSURE: 125 MMHG | DIASTOLIC BLOOD PRESSURE: 76 MMHG | WEIGHT: 163.9 LBS | OXYGEN SATURATION: 100 % | BODY MASS INDEX: 27.98 KG/M2 | HEART RATE: 106 BPM

## 2022-09-28 DIAGNOSIS — R87.610 ATYPICAL SQUAMOUS CELLS OF UNDETERMINED SIGNIFICANCE ON CYTOLOGIC SMEAR OF CERVIX (ASC-US): ICD-10-CM

## 2022-09-28 DIAGNOSIS — Z30.42 DEPO-PROVERA CONTRACEPTIVE STATUS: Primary | ICD-10-CM

## 2022-09-28 DIAGNOSIS — Z30.42 DEPO-PROVERA CONTRACEPTIVE STATUS: ICD-10-CM

## 2022-09-28 DIAGNOSIS — T78.40XD ALLERGIC REACTION, SUBSEQUENT ENCOUNTER: ICD-10-CM

## 2022-09-28 DIAGNOSIS — J45.909 ASTHMA: Primary | ICD-10-CM

## 2022-09-28 DIAGNOSIS — D57.00 SICKLE CELL PAIN CRISIS (H): ICD-10-CM

## 2022-09-28 DIAGNOSIS — D57.1 HB-SS DISEASE WITHOUT CRISIS (H): ICD-10-CM

## 2022-09-28 LAB — HCG UR QL: NEGATIVE

## 2022-09-28 PROCEDURE — G0463 HOSPITAL OUTPT CLINIC VISIT: HCPCS | Mod: 25

## 2022-09-28 PROCEDURE — 94729 DIFFUSING CAPACITY: CPT | Performed by: INTERNAL MEDICINE

## 2022-09-28 PROCEDURE — 96372 THER/PROPH/DIAG INJ SC/IM: CPT | Performed by: PEDIATRICS

## 2022-09-28 PROCEDURE — 94375 RESPIRATORY FLOW VOLUME LOOP: CPT | Performed by: INTERNAL MEDICINE

## 2022-09-28 PROCEDURE — 99215 OFFICE O/P EST HI 40 MIN: CPT | Mod: 25 | Performed by: INTERNAL MEDICINE

## 2022-09-28 PROCEDURE — 94726 PLETHYSMOGRAPHY LUNG VOLUMES: CPT | Performed by: INTERNAL MEDICINE

## 2022-09-28 PROCEDURE — 81025 URINE PREGNANCY TEST: CPT | Performed by: PATHOLOGY

## 2022-09-28 PROCEDURE — 99215 OFFICE O/P EST HI 40 MIN: CPT | Mod: 25 | Performed by: PEDIATRICS

## 2022-09-28 RX ORDER — MEDROXYPROGESTERONE ACETATE 150 MG/ML
150 INJECTION, SUSPENSION INTRAMUSCULAR
Status: DISCONTINUED | OUTPATIENT
Start: 2022-09-28 | End: 2023-05-26

## 2022-09-28 RX ADMIN — MEDROXYPROGESTERONE ACETATE 150 MG: 150 INJECTION, SUSPENSION INTRAMUSCULAR at 10:39

## 2022-09-28 NOTE — LETTER
9/28/2022         RE: Jennifer Cervantes  [address]  [facility] [address]        Dear Colleague,    Thank you for referring your patient, Jennifer Cervantes, to the [facility] [place]. Please see a copy of my visit note below.    [facility]    Name: Jennifer Cervantes MRN: [id number]     Age: 22 year old   YOB: 2000       Reason for Visit: shortnes of breath episodes          HPI:   Jennifer Cervantes is a 22 year old female with history of  Sickle cell disease, asthma, PFO, h/o stroke, with frequent pain crisis and now acute chest syndrome/pneumonias over the past 2 years. Today she feels well.     She is coming in today because of episodes of severe shortness of breath that happen when she gets sharp central chest pain. This is what she has been admitted for and called acute chest syndrome. Her asthma seems well controlled. She hasn't noticed any particular triggers. Today her PFTs show moderate restriction which is not new and a new diffusion deficit. In the past she has been evaluated for pulm HTN but right heart cath was normal. She has missed visits with cardiology to followup.     No smoking. Unable to work due to frequent admissions.     10 point ROS performed and negative except for as noted in HPI.         Past Medical History:     Past Medical History:   Diagnosis Date     Anxiety      Bleeding disorder (H)      Blood clotting disorder (H)      Cerebral infarction (H) 2015     Cognitive developmental delay     low IQ. Please recognize when managing pain and planning with her     Depressive disorder      Hemiplegia and hemiparesis following cerebral infarction affecting right dominant side (H)     right hand contractures     Iron overload due to repeated red blood cell transfusions      Migraines      Multiple subsegmental pulmonary emboli without acute cor pulmonale (H) 02/01/2021     Oppositional defiant behavior       Superficial venous thrombosis of arm, right 03/25/2021     Uncomplicated asthma              Past Surgical History:      Past Surgical History:   Procedure Laterality Date     AS INSERT TUNNELED CV 2 CATH W/O PORT/PUMP       CHOLECYSTECTOMY       CV RIGHT HEART CATH MEASUREMENTS RECORDED N/A 11/18/2021    Procedure: Right Heart Cath;  Surgeon: Jackson Stauffer MD;  Location:  HEART CARDIAC CATH LAB     INSERT PORT VASCULAR ACCESS Left 4/21/2021    Procedure: INSERTION, VASCULAR ACCESS PORT (NOT SURE ON SIDE UNTIL REMOVAL);  Surgeon: Rajan More MD;  Location: [facility] OR     IR CHEST PORT PLACEMENT > 5 YRS OF AGE  4/21/2021     IR CVC NON TUNNEL LINE REMOVAL  5/6/2021     IR CVC NON TUNNEL PLACEMENT > 5 YRS  04/07/2020     IR CVC NON TUNNEL PLACEMENT > 5 YRS  4/30/2021     IR CVC NON TUNNEL PLACEMENT > 5 YRS  9/7/2022     IR PORT REMOVAL LEFT  4/21/2021     REMOVE PORT VASCULAR ACCESS Left 4/21/2021    Procedure: REMOVAL, VASCULAR ACCESS PORT LEFT;  Surgeon: Rajan More MD;  Location: [facility] OR     REPAIR TENDON ELBOW Right 10/02/2019    Procedure: Right Elbow Flexor Lengthening, Flexor Pronator Slide Of Wrist and Finger, Thumb Adductor Lengthening;  Surgeon: Anai Franco MD;  [facility]     TONSILLECTOMY Bilateral 10/02/2019    Procedure: Bilateral Tonsillectomy;  Surgeon: Farhana Guy MD;  [facility]: [facility]     ZZC BREAST REDUCTION (INCLUDES LIPO) TIER 3 Bilateral 04/23/2019             Social History:     Social History     Socioeconomic History     Marital status: Single     Spouse name: Not on file     Number of children: Not on file     Years of education: Not on file     Highest education level: Not on file   Occupational History     Not on file   Tobacco Use     Smoking status: Never Smoker     Smokeless tobacco: Never Used   Substance and Sexual Activity     Alcohol use: Not Currently     Alcohol/week: 0.0 standard drinks     Drug use: Never     Sexual activity: Not  "Currently     Partners: Male     Birth control/protection: Other   Other Topics Concern     Parent/sibling w/ CABG, MI or angioplasty before 65F 55M? Not Asked   Social History Narrative    Lives with mom and extended family (mom is her PCA and ILS worker) but \"toxic environment\" per her report. As of 9/28/2022 she is planning to move out at some point soon. She has minimal support at home despite her significant SCD comorbidities and cognitive delay from stroke.              Family History:     Family History   Problem Relation Age of Onset     Sickle Cell Trait Mother      Hypertension Mother      Asthma Mother      Sickle Cell Trait Father      Glaucoma No family hx of      Macular Degeneration No family hx of      Diabetes No family hx of      Gout No family hx of              Allergies:     Allergies   Allergen Reactions     Contrast Dye      Hives and breathing issues     Fish-Derived Products Hives     Seafood Hives     Diagnostic X-Ray Materials      Gadolinium              Medications:   albuterol (PROVENTIL) (2.5 MG/3ML) 0.083% neb solution, Take 2 vials (5 mg) by nebulization every 6 hours as needed for shortness of breath / dyspnea or wheezing  aspirin (ASA) 81 MG chewable tablet, Take 1 tablet (81 mg) by mouth 2 times daily  budesonide-formoterol (SYMBICORT) 160-4.5 MCG/ACT Inhaler, Inhale 2 puffs into the lungs 2 times daily  deferasirox (JADENU) 360 MG tablet, Take 4 tablets (1,440 mg) by mouth every evening  Deferiprone, Twice Daily, 1000 MG TABS, Take 1,500 mg by mouth 2 times daily  EPINEPHrine (ANY BX GENERIC EQUIV) 0.3 MG/0.3ML injection 2-pack, Inject 0.3 mLs (0.3 mg) into the muscle as needed for anaphylaxis  Hydroxyurea 1000 MG TABS, Take 3,000 mg by mouth daily  ondansetron (ZOFRAN) 8 MG tablet, Take 1 tablet (8 mg) by mouth every 8 hours as needed  oxyCODONE IR (ROXICODONE) 15 MG tablet, Take 1 tablet (15 mg) by mouth every 4 hours as needed for severe pain (7-10) Goal 4 per day. Max 6 per " "day.  acetaminophen (TYLENOL) 325 MG tablet, Take 2 tablets (650 mg) by mouth every 6 hours as needed for mild pain (Patient not taking: No sig reported)           Exam:   /76   Pulse 106   Ht 1.626 m (5' 4\")   Wt 73.9 kg (163 lb)   BMI 27.98 kg/m      Gen: NAD, breathing comfortably  CV: rrr, systolic murmur present   Resp: CTAB  Skin: no scleral icterus, no cyanosis  Extremities: no edema  Neuro: residual deficits from stroke         Data:     Abs eosinophils 200-300    CT PE study in Sept 2022  IMPRESSION:   1. No acute central or lobar pulmonary embolism. Limited evaluation of  the segmental and subsegmental arteries due to bolus timing.  2. Small to moderate right and small left pleural effusions with  associated atelectasis. There are scattered small bilateral nodular  opacities suspicious for infectious or inflammatory etiology.  3. Cystic area in the right supraclavicular region demonstrated as  seen on recent ultrasound. There may also be some cervical lymph nodes  present. As discussed on prior ultrasound, could consider dedicated  CT/MRI soft tissue neck for further evaluation of these findings if  this has not been obtained at outside institution.    PFT   FEV1 FVC FEV1/FVC TLC DLCO   Sept 2022 1.67 54% 1.95 55% 86% 3.35 67% Corrected 13.36 59%                               ECHO Feb 2022    Interpretation Summary  Global and regional left ventricular function is normal with an EF of 55-60%.  Right ventricular function, chamber size, wall motion, and thickness are  normal.  The patent foramen ovale was demonstrated by agitated saline bubble study .  Pulmonary artery systolic pressure cannot be assessed.  The inferior vena cava is normal.  No pericardial effusion is present.  No significant changes noted.           Assessment and Plan:     22 year old female with sickle cell anemia, frequent pain and chest crises coming today for evaluation of chest pain and dyspnea that occur with chest crisis. " Those symptoms seem to be associated with her acute chest syndrome and possibly her pain crisis. She also has moderate restriction and moderate diffusion defect which dates back to prior PFTs. She does also have asthma but it is fairly well controlled currently.    # Moderate restriction and Moderate diffusion defect  - ECHO and follow up with cardiology. The diffusion defect and history of sickle cell raise concern for pulmonary hypertension. She sees Dr. Gr    # Asthma  Continue symbicort      Patient staffed with Dr. Henao. Return to clinic in 6 months      Rosalva Gasca MD  Pulmonary and Critical Care Fellow  [phone]

## 2022-09-28 NOTE — PROGRESS NOTES
[facility]    Name: Jennifer Cervantes MRN: [id number]     Age: 22 year old   YOB: 2000       Reason for Visit: shortnes of breath episodes          HPI:   Jennifer Cervantes is a 22 year old female with history of  Sickle cell disease, asthma, PFO, h/o stroke, with frequent pain crisis and now acute chest syndrome/pneumonias over the past 2 years. Today she feels well.     She is coming in today because of episodes of severe shortness of breath that happen when she gets sharp central chest pain. This is what she has been admitted for and called acute chest syndrome. Her asthma seems well controlled. She hasn't noticed any particular triggers. Today her PFTs show moderate restriction which is not new and a new diffusion deficit. In the past she has been evaluated for pulm HTN but right heart cath was normal. She has missed visits with cardiology to followup.     No smoking. Unable to work due to frequent admissions.     10 point ROS performed and negative except for as noted in HPI.         Past Medical History:     Past Medical History:   Diagnosis Date     Anxiety      Bleeding disorder (H)      Blood clotting disorder (H)      Cerebral infarction (H) 2015     Cognitive developmental delay     low IQ. Please recognize when managing pain and planning with her     Depressive disorder      Hemiplegia and hemiparesis following cerebral infarction affecting right dominant side (H)     right hand contractures     Iron overload due to repeated red blood cell transfusions      Migraines      Multiple subsegmental pulmonary emboli without acute cor pulmonale (H) 02/01/2021     Oppositional defiant behavior      Superficial venous thrombosis of arm, right 03/25/2021     Uncomplicated asthma              Past Surgical History:      Past Surgical History:   Procedure Laterality Date     AS INSERT TUNNELED CV 2 CATH W/O PORT/PUMP       CHOLECYSTECTOMY       CV RIGHT HEART CATH MEASUREMENTS  "RECORDED N/A 11/18/2021    Procedure: Right Heart Cath;  Surgeon: Jackson Stauffer MD;  Location:  HEART CARDIAC CATH LAB     INSERT PORT VASCULAR ACCESS Left 4/21/2021    Procedure: INSERTION, VASCULAR ACCESS PORT (NOT SURE ON SIDE UNTIL REMOVAL);  Surgeon: Rajan More MD;  Location: [facility] OR     IR CHEST PORT PLACEMENT > 5 YRS OF AGE  4/21/2021     IR CVC NON TUNNEL LINE REMOVAL  5/6/2021     IR CVC NON TUNNEL PLACEMENT > 5 YRS  04/07/2020     IR CVC NON TUNNEL PLACEMENT > 5 YRS  4/30/2021     IR CVC NON TUNNEL PLACEMENT > 5 YRS  9/7/2022     IR PORT REMOVAL LEFT  4/21/2021     REMOVE PORT VASCULAR ACCESS Left 4/21/2021    Procedure: REMOVAL, VASCULAR ACCESS PORT LEFT;  Surgeon: Rajan More MD;  Location: [facility] OR     REPAIR TENDON ELBOW Right 10/02/2019    Procedure: Right Elbow Flexor Lengthening, Flexor Pronator Slide Of Wrist and Finger, Thumb Adductor Lengthening;  Surgeon: Anai Franco MD;  [facility]     TONSILLECTOMY Bilateral 10/02/2019    Procedure: Bilateral Tonsillectomy;  Surgeon: Farhana Guy MD;  [facility]     ZZC BREAST REDUCTION (INCLUDES LIPO) TIER 3 Bilateral 04/23/2019             Social History:     Social History     Socioeconomic History     Marital status: Single     Spouse name: Not on file     Number of children: Not on file     Years of education: Not on file     Highest education level: Not on file   Occupational History     Not on file   Tobacco Use     Smoking status: Never Smoker     Smokeless tobacco: Never Used   Substance and Sexual Activity     Alcohol use: Not Currently     Alcohol/week: 0.0 standard drinks     Drug use: Never     Sexual activity: Not Currently     Partners: Male     Birth control/protection: Other   Other Topics Concern     Parent/sibling w/ CABG, MI or angioplasty before 65F 55M? Not Asked   Social History Narrative    Lives with mom and extended family (mom is her PCA and ILS worker) but \"toxic environment\" per her " "report. As of 9/28/2022 she is planning to move out at some point soon. She has minimal support at home despite her significant SCD comorbidities and cognitive delay from stroke.              Family History:     Family History   Problem Relation Age of Onset     Sickle Cell Trait Mother      Hypertension Mother      Asthma Mother      Sickle Cell Trait Father      Glaucoma No family hx of      Macular Degeneration No family hx of      Diabetes No family hx of      Gout No family hx of              Allergies:     Allergies   Allergen Reactions     Contrast Dye      Hives and breathing issues     Fish-Derived Products Hives     Seafood Hives     Diagnostic X-Ray Materials      Gadolinium              Medications:   albuterol (PROVENTIL) (2.5 MG/3ML) 0.083% neb solution, Take 2 vials (5 mg) by nebulization every 6 hours as needed for shortness of breath / dyspnea or wheezing  aspirin (ASA) 81 MG chewable tablet, Take 1 tablet (81 mg) by mouth 2 times daily  budesonide-formoterol (SYMBICORT) 160-4.5 MCG/ACT Inhaler, Inhale 2 puffs into the lungs 2 times daily  deferasirox (JADENU) 360 MG tablet, Take 4 tablets (1,440 mg) by mouth every evening  Deferiprone, Twice Daily, 1000 MG TABS, Take 1,500 mg by mouth 2 times daily  EPINEPHrine (ANY BX GENERIC EQUIV) 0.3 MG/0.3ML injection 2-pack, Inject 0.3 mLs (0.3 mg) into the muscle as needed for anaphylaxis  Hydroxyurea 1000 MG TABS, Take 3,000 mg by mouth daily  ondansetron (ZOFRAN) 8 MG tablet, Take 1 tablet (8 mg) by mouth every 8 hours as needed  oxyCODONE IR (ROXICODONE) 15 MG tablet, Take 1 tablet (15 mg) by mouth every 4 hours as needed for severe pain (7-10) Goal 4 per day. Max 6 per day.  acetaminophen (TYLENOL) 325 MG tablet, Take 2 tablets (650 mg) by mouth every 6 hours as needed for mild pain (Patient not taking: No sig reported)           Exam:   /76   Pulse 106   Ht 1.626 m (5' 4\")   Wt 73.9 kg (163 lb)   BMI 27.98 kg/m      Gen: NAD, breathing " comfortably  CV: rrr, systolic murmur present   Resp: CTAB  Skin: no scleral icterus, no cyanosis  Extremities: no edema  Neuro: residual deficits from stroke         Data:     Abs eosinophils 200-300    CT PE study in Sept 2022  IMPRESSION:   1. No acute central or lobar pulmonary embolism. Limited evaluation of  the segmental and subsegmental arteries due to bolus timing.  2. Small to moderate right and small left pleural effusions with  associated atelectasis. There are scattered small bilateral nodular  opacities suspicious for infectious or inflammatory etiology.  3. Cystic area in the right supraclavicular region demonstrated as  seen on recent ultrasound. There may also be some cervical lymph nodes  present. As discussed on prior ultrasound, could consider dedicated  CT/MRI soft tissue neck for further evaluation of these findings if  this has not been obtained at outside institution.    PFT   FEV1 FVC FEV1/FVC TLC DLCO   Sept 2022 1.67 54% 1.95 55% 86% 3.35 67% Corrected 13.36 59%                               ECHO Feb 2022    Interpretation Summary  Global and regional left ventricular function is normal with an EF of 55-60%.  Right ventricular function, chamber size, wall motion, and thickness are  normal.  The patent foramen ovale was demonstrated by agitated saline bubble study .  Pulmonary artery systolic pressure cannot be assessed.  The inferior vena cava is normal.  No pericardial effusion is present.  No significant changes noted.           Assessment and Plan:     22 year old female with sickle cell anemia, frequent pain and chest crises coming today for evaluation of chest pain and dyspnea that occur with chest crisis. Those symptoms seem to be associated with her acute chest syndrome and possibly her pain crisis. She also has moderate restriction and moderate diffusion defect which dates back to prior PFTs. She does also have asthma but it is fairly well controlled currently.    # Moderate  restriction and Moderate diffusion defect  - ECHO and follow up with cardiology. The diffusion defect and history of sickle cell raise concern for pulmonary hypertension. She sees Dr. Gr    # Asthma  Continue symbicort      Patient staffed with Dr. Lake. Return to clinic in 6 months      Rosalva Gasca MD  Pulmonary and Critical Care Fellow  [phone]    Attending: Patient seen, examined, and discussed with Dr. Gasca.  All data reviewed. Agree with the assessment and plan as outlined in the above note. Needs re-assessment for severity of pulmonary hypertension.  Asthma is currently well controlled.     Supriya Lake MD  [phone]

## 2022-09-28 NOTE — PROGRESS NOTES
"    Dear patient. Thank you for visiting with me. I want you to feel respected, understood, and empowered. \"Respect\" is valuing you as much as I would a close family member. \"Empowerment\" happens when you are fully informed, and can make the best possible decision for you.  Please ask me questions!  Challenge anything that is not clear.    Medical records are primarily used as memory aids for me and my colleagues. Things to know about my documentation style:  - The 'problem list' includes current symptoms or diagnoses, and some problems that are resolved but may return. I use the past medical history for problems not expected to return.  - I use single quotation marks for things that you or I said, when I want to clarify who was speaking.  - I use double quotation marks when copying a term from elsewhere in your records. Italics (besides here) may also denote a quotation.  If you have questions or concerns, please contact me; I will reply as soon as time allows.    Context    Jennifer Cervantes is a 22 year old woman, with concerns including:  Chief Complaint   Patient presents with     Physical     Annual physical.  Depo shot.     PCP: Suraj Case   Visit type: problem-oriented    /76 (BP Location: Left arm, Patient Position: Sitting, Cuff Size: Adult Large)   Pulse 106   Ht 1.626 m (5' 4.02\")   Wt 74.3 kg (163 lb 14.4 oz)   SpO2 100%   BMI 28.12 kg/m      Problems and progress        On Depo for contraception and menstrual suppression  No periods  Depo every 3 months.  IUD in place is still marked in the problem list - I wasn't able to find a note about removal.        Sickle cell  Tends to do better in the summer.  Discussion about oxycodone timing at hematology.          Good reduction in stress  Feels she is getting adequate support.  Has been depressed for a long time, although not as bad now as has been at times in the past. When this happens she stays in her room except for eating and " bathroom. Family sometimes checks because not sure she is even in there. Doesn't do anything for fun - is dealing with health issues most of the time. She used to read and write, go bowling, out to eat, a movie, girls. Always busy or being in the ER.  She states she generally doesn't 'talk about feelings with family.' She doesn't like to express herself there.           CAMI needs to come back for another pap - she says she is comfortable having me do it. She will schedule.      Allergic reaction  Takes benadryl at night for allergies and to help her sleep.  If allergies are acting up, she might take benadryl during the day.  Fish, seafood, contrast dye and gadolinium.         Outstanding issues (Dr. Case)  --------------------------------------------  -- discuss with hematologist  -- allergies  -- ASCUS   -- Housing        About this visit:  Time note (e5, 40'): The total time (on the date of service) for this service was 55 minutes, including discussion/face-to-face, chart review, interpretation not otherwise reported, documentation, and updating of the computerized record.

## 2022-09-28 NOTE — TELEPHONE ENCOUNTER
PA Initiation    Medication: Deferiprone 1000MG tablets (Dose increase) - Pending  Insurance Company: [employer] - Phone [phone] Fax [phone]  Pharmacy Filling the Rx: [facility] - [place], [place] - [address]  Filling Pharmacy Phone:    Filling Pharmacy Fax:    Start Date: 9/28/2022

## 2022-09-29 ENCOUNTER — INFUSION THERAPY VISIT (OUTPATIENT)
Dept: TRANSPLANT | Facility: CLINIC | Age: 22
End: 2022-09-29
Attending: PEDIATRICS
Payer: COMMERCIAL

## 2022-09-29 ENCOUNTER — TELEPHONE (OUTPATIENT)
Dept: ONCOLOGY | Facility: CLINIC | Age: 22
End: 2022-09-29

## 2022-09-29 ENCOUNTER — PATIENT OUTREACH (OUTPATIENT)
Dept: CARE COORDINATION | Facility: CLINIC | Age: 22
End: 2022-09-29

## 2022-09-29 ENCOUNTER — NURSE TRIAGE (OUTPATIENT)
Dept: ONCOLOGY | Facility: CLINIC | Age: 22
End: 2022-09-29

## 2022-09-29 VITALS
DIASTOLIC BLOOD PRESSURE: 82 MMHG | SYSTOLIC BLOOD PRESSURE: 137 MMHG | RESPIRATION RATE: 16 BRPM | TEMPERATURE: 98.1 F | HEART RATE: 104 BPM | OXYGEN SATURATION: 98 %

## 2022-09-29 DIAGNOSIS — D57.00 SICKLE CELL PAIN CRISIS (H): ICD-10-CM

## 2022-09-29 DIAGNOSIS — G81.10 SPASTIC HEMIPLEGIA, UNSPECIFIED ETIOLOGY, UNSPECIFIED LATERALITY (H): Primary | ICD-10-CM

## 2022-09-29 LAB
DLCOCOR-%PRED-PRE: 59 %
DLCOCOR-PRE: 13.36 ML/MIN/MMHG
DLCOUNC-%PRED-PRE: 50 %
DLCOUNC-PRE: 11.24 ML/MIN/MMHG
DLCOUNC-PRED: 22.27 ML/MIN/MMHG
ERV-%PRED-PRE: 35 %
ERV-PRE: 0.4 L
ERV-PRED: 1.12 L
EXPTIME-PRE: 5.14 SEC
FEF2575-%PRED-PRE: 54 %
FEF2575-PRE: 2 L/SEC
FEF2575-PRED: 3.66 L/SEC
FEFMAX-%PRED-PRE: 83 %
FEFMAX-PRE: 5.76 L/SEC
FEFMAX-PRED: 6.9 L/SEC
FEV1-%PRED-PRE: 54 %
FEV1-PRE: 1.67 L
FEV1FEV6-PRE: 86 %
FEV1FEV6-PRED: 87 %
FEV1FVC-PRE: 86 %
FEV1FVC-PRED: 88 %
FEV1SVC-PRE: 83 %
FEV1SVC-PRED: 76 %
FIFMAX-PRE: 2.63 L/SEC
FRCPLETH-%PRED-PRE: 64 %
FRCPLETH-PRE: 1.73 L
FRCPLETH-PRED: 2.67 L
FVC-%PRED-PRE: 55 %
FVC-PRE: 1.95 L
FVC-PRED: 3.51 L
IC-%PRED-PRE: 55 %
IC-PRE: 1.62 L
IC-PRED: 2.93 L
RVPLETH-%PRED-PRE: 99 %
RVPLETH-PRE: 1.33 L
RVPLETH-PRED: 1.34 L
TLCPLETH-%PRED-PRE: 67 %
TLCPLETH-PRE: 3.35 L
TLCPLETH-PRED: 4.94 L
VA-%PRED-PRE: 57 %
VA-PRE: 2.7 L
VC-%PRED-PRE: 49 %
VC-PRE: 2.02 L
VC-PRED: 4.05 L

## 2022-09-29 PROCEDURE — 96365 THER/PROPH/DIAG IV INF INIT: CPT

## 2022-09-29 PROCEDURE — 96375 TX/PRO/DX INJ NEW DRUG ADDON: CPT

## 2022-09-29 PROCEDURE — 96366 THER/PROPH/DIAG IV INF ADDON: CPT

## 2022-09-29 PROCEDURE — 258N000003 HC RX IP 258 OP 636: Performed by: PEDIATRICS

## 2022-09-29 PROCEDURE — 250N000011 HC RX IP 250 OP 636: Performed by: PEDIATRICS

## 2022-09-29 PROCEDURE — 96376 TX/PRO/DX INJ SAME DRUG ADON: CPT

## 2022-09-29 RX ORDER — MORPHINE SULFATE 2 MG/ML
2 INJECTION, SOLUTION INTRAMUSCULAR; INTRAVENOUS
Status: CANCELLED
Start: 2022-10-01

## 2022-09-29 RX ORDER — OXYCODONE HYDROCHLORIDE 15 MG/1
15 TABLET ORAL EVERY 4 HOURS PRN
Qty: 40 TABLET | Refills: 0 | Status: SHIPPED | OUTPATIENT
Start: 2022-09-29 | End: 2022-10-06

## 2022-09-29 RX ORDER — ONDANSETRON 8 MG/1
8 TABLET, FILM COATED ORAL
Status: CANCELLED
Start: 2022-10-01

## 2022-09-29 RX ORDER — DIPHENHYDRAMINE HCL 25 MG
25 CAPSULE ORAL
Status: CANCELLED
Start: 2022-10-01

## 2022-09-29 RX ORDER — HEPARIN SODIUM (PORCINE) LOCK FLUSH IV SOLN 100 UNIT/ML 100 UNIT/ML
5 SOLUTION INTRAVENOUS
Status: DISCONTINUED | OUTPATIENT
Start: 2022-09-29 | End: 2022-09-29 | Stop reason: HOSPADM

## 2022-09-29 RX ORDER — HEPARIN SODIUM (PORCINE) LOCK FLUSH IV SOLN 100 UNIT/ML 100 UNIT/ML
5 SOLUTION INTRAVENOUS
Status: CANCELLED | OUTPATIENT
Start: 2022-10-01

## 2022-09-29 RX ORDER — HEPARIN SODIUM,PORCINE 10 UNIT/ML
5 VIAL (ML) INTRAVENOUS
Status: CANCELLED | OUTPATIENT
Start: 2022-10-01

## 2022-09-29 RX ORDER — MORPHINE SULFATE 2 MG/ML
2 INJECTION, SOLUTION INTRAMUSCULAR; INTRAVENOUS
Status: COMPLETED | OUTPATIENT
Start: 2022-09-29 | End: 2022-09-29

## 2022-09-29 RX ADMIN — MORPHINE SULFATE 2 MG: 2 INJECTION, SOLUTION INTRAMUSCULAR; INTRAVENOUS at 15:56

## 2022-09-29 RX ADMIN — MORPHINE SULFATE 2 MG: 2 INJECTION, SOLUTION INTRAMUSCULAR; INTRAVENOUS at 13:53

## 2022-09-29 RX ADMIN — MORPHINE SULFATE 2 MG: 2 INJECTION, SOLUTION INTRAMUSCULAR; INTRAVENOUS at 14:57

## 2022-09-29 RX ADMIN — DEXTROSE AND SODIUM CHLORIDE 1000 ML: 5; 450 INJECTION, SOLUTION INTRAVENOUS at 13:53

## 2022-09-29 ASSESSMENT — PAIN SCALES - GENERAL: PAINLEVEL: EXTREME PAIN (9)

## 2022-09-29 NOTE — NURSING NOTE
"Oncology Rooming Note    September 29, 2022 2:06 PM   Jennifer Cervantes is a 22 year old female who presents for:    Chief Complaint   Patient presents with     Infusion     IV fluids and pain medications.  History of sickle cell disease.     Initial Vitals: /82 (BP Location: Left arm, Patient Position: Sitting, Cuff Size: Adult Regular)   Pulse 104   Temp 98.1  F (36.7  C) (Oral)   Resp 16   SpO2 98%  Estimated body mass index is 27.98 kg/m  as calculated from the following:    Height as of 9/28/22: 1.626 m (5' 4\").    Weight as of 9/28/22: 73.9 kg (163 lb). There is no height or weight on file to calculate BSA.  Extreme Pain (9) Comment: Data Unavailable   No LMP recorded. Patient has had an injection.  Allergies reviewed: Yes  Medications reviewed: Yes    Medications: Patricia Mantilla ANDREAS paged regarding oxycodone refill.  Pharmacy name entered into [employer] [place]: [facility] [place], [place] - [address] [place] [address] [phone]    Clinical concerns: none       Jamaica Napoles RN              "

## 2022-09-29 NOTE — TELEPHONE ENCOUNTER
Pt is calling asking to speak to Triage about getting IVF/Pain meds.  Pt requesting a return call from Triage as soon as possible.

## 2022-09-29 NOTE — LETTER
9/29/2022         RE: Jennifer Cervantes  [address]  [facility] [address]        Dear Colleague,    Thank you for referring your patient, Jennifer Cervantes, to the [facility]PL[facility] [place]. Please see a copy of my visit note below.    Infusion Nursing Note:  Jennifer Cervantes presents today for IV fluids and pain medications..    Patient seen by provider today: No   present during visit today: Not Applicable.    Note:   Pt received a liter of D5.45NS over two hours.    Pt received 2 mg morphine IV push every hour for three doses.    Intravenous Access:  Implanted Port.    Treatment Conditions:  Not Applicable.    Post Infusion Assessment:  Patient tolerated infusion without incident.  Blood return noted pre and post infusion.  Site patent and intact, free from redness, edema or discomfort.  No evidence of extravasations.  Access discontinued per protocol.     Discharge Plan:   Refill called in for oxycodone by [employer] ANDREAS.  Discharge instructions reviewed with: Patient.  Departure Mode: Ambulatory.        Jamaica Napoles RN                          Again, thank you for allowing me to participate in the care of your patient.        Sincerely,        [facility]

## 2022-09-29 NOTE — TELEPHONE ENCOUNTER
[facility] Triage    Pt called in to triage requesting IVF pain meds for lower back, rib cage pain rated 9/10  X 2 days. Stated last took oxy this morning without relief. Denied any fevers, chills, cough, sob, chest pain or other symptoms.  Assess for confusion, numbness, paralysis, vomiting, headache, vision issues, difficulty walking and/or talking. Pt's last infusion was 9/26, last clinic visit 9/26 Perla with follow-up on follow up scheduled. Patient states they do not have own ride and it will take 60 long to get to cancer clinic. Pt states needs medication refill.  Reviewing patients medication list and she just got her oxy refilled on 9/22 and she has refills left of hydroxyurea and baby asa. Pt qualifies for sickle cell standing order protocol.    Placed patient on list and advised if we do not call her by 2 pm and she is still at a 9/10 she needs to go to the ED.    Patient is able to be seen for IVF and pain meds in BMT today at 1pm in BMT..  Patient is aware and will wait for SW to organize a ride and call her.  CCOD notified to place Jennifer on BMT's schedule at 1 pm.  [employer] setting up a ride.    Routing to Patricia Mantilla

## 2022-09-29 NOTE — PROGRESS NOTES
Community Health Worker Note: Telephone Call  Oncology Clinic     Data/Intervention:  Patient Name:  Jennifer Cervantes  /Age: [birth year]     Call From: Triage Nurse        Reason for Call:  Transportation      Assessment:  CHW called [employer] ([phone])  to arrange ride through patient's insurance. [employer] arranged a round trip ride as a will call.  Patient will need to call when ready for return ride home [phone]. Talked to patient and she will call [employer] to setup  time     Plan:  Patient is aware of the transportation plan.  available to assist with any other needs.      Isaura OTTO Community Health Worker  Clinic Care Coordination  [facility]  Phone: [phone]

## 2022-09-29 NOTE — PROGRESS NOTES
Infusion Nursing Note:  Jennifer Cervantes presents today for IV fluids and pain medications..    Patient seen by provider today: No   present during visit today: Not Applicable.    Note:   Pt received a liter of D5.45NS over two hours.    Pt received 2 mg morphine IV push every hour for three doses.    Intravenous Access:  Implanted Port.    Treatment Conditions:  Not Applicable.    Post Infusion Assessment:  Patient tolerated infusion without incident.  Blood return noted pre and post infusion.  Site patent and intact, free from redness, edema or discomfort.  No evidence of extravasations.  Access discontinued per protocol.     Discharge Plan:   Refill called in for oxycodone by Patricia JOHNS.  Discharge instructions reviewed with: Patient.  Departure Mode: Ambulatory.        Jamaica Napoles RN

## 2022-09-30 NOTE — TELEPHONE ENCOUNTER
Insurance requesting additional information, faxed additional info to the insurance company.    Thank you,    Eva Burden  Oncology/Transplant Float [email or web address]  Phone:[phone]  Fax:[phone]

## 2022-10-03 ENCOUNTER — INFUSION THERAPY VISIT (OUTPATIENT)
Dept: TRANSPLANT | Facility: CLINIC | Age: 22
End: 2022-10-03
Attending: PEDIATRICS
Payer: COMMERCIAL

## 2022-10-03 ENCOUNTER — HOME INFUSION (PRE-WILLOW HOME INFUSION) (OUTPATIENT)
Dept: PHARMACY | Facility: CLINIC | Age: 22
End: 2022-10-03

## 2022-10-03 ENCOUNTER — TELEPHONE (OUTPATIENT)
Dept: ONCOLOGY | Facility: CLINIC | Age: 22
End: 2022-10-03

## 2022-10-03 VITALS
TEMPERATURE: 98.6 F | DIASTOLIC BLOOD PRESSURE: 87 MMHG | HEART RATE: 96 BPM | OXYGEN SATURATION: 99 % | SYSTOLIC BLOOD PRESSURE: 131 MMHG | RESPIRATION RATE: 18 BRPM

## 2022-10-03 DIAGNOSIS — D57.00 SICKLE CELL PAIN CRISIS (H): ICD-10-CM

## 2022-10-03 DIAGNOSIS — G81.10 SPASTIC HEMIPLEGIA, UNSPECIFIED ETIOLOGY, UNSPECIFIED LATERALITY (H): Primary | ICD-10-CM

## 2022-10-03 PROCEDURE — 96374 THER/PROPH/DIAG INJ IV PUSH: CPT

## 2022-10-03 PROCEDURE — 258N000003 HC RX IP 258 OP 636: Performed by: PEDIATRICS

## 2022-10-03 PROCEDURE — 250N000011 HC RX IP 250 OP 636: Performed by: PEDIATRICS

## 2022-10-03 PROCEDURE — 96376 TX/PRO/DX INJ SAME DRUG ADON: CPT

## 2022-10-03 PROCEDURE — 96361 HYDRATE IV INFUSION ADD-ON: CPT

## 2022-10-03 RX ORDER — HEPARIN SODIUM,PORCINE 10 UNIT/ML
5 VIAL (ML) INTRAVENOUS
Status: CANCELLED | OUTPATIENT
Start: 2023-01-01

## 2022-10-03 RX ORDER — MORPHINE SULFATE 2 MG/ML
2 INJECTION, SOLUTION INTRAMUSCULAR; INTRAVENOUS
Status: DISCONTINUED | OUTPATIENT
Start: 2022-10-03 | End: 2022-10-03 | Stop reason: HOSPADM

## 2022-10-03 RX ORDER — MORPHINE SULFATE 2 MG/ML
2 INJECTION, SOLUTION INTRAMUSCULAR; INTRAVENOUS
Status: CANCELLED
Start: 2023-01-01

## 2022-10-03 RX ORDER — DIPHENHYDRAMINE HCL 25 MG
25 CAPSULE ORAL
Status: CANCELLED
Start: 2023-01-01

## 2022-10-03 RX ORDER — HEPARIN SODIUM (PORCINE) LOCK FLUSH IV SOLN 100 UNIT/ML 100 UNIT/ML
5 SOLUTION INTRAVENOUS
Status: CANCELLED | OUTPATIENT
Start: 2023-01-01

## 2022-10-03 RX ORDER — ONDANSETRON 8 MG/1
8 TABLET, FILM COATED ORAL
Status: CANCELLED
Start: 2023-01-01

## 2022-10-03 RX ADMIN — MORPHINE SULFATE 2 MG: 2 INJECTION, SOLUTION INTRAMUSCULAR; INTRAVENOUS at 10:59

## 2022-10-03 RX ADMIN — DEXTROSE AND SODIUM CHLORIDE 1000 ML: 5; 450 INJECTION, SOLUTION INTRAVENOUS at 09:59

## 2022-10-03 RX ADMIN — MORPHINE SULFATE 2 MG: 2 INJECTION, SOLUTION INTRAMUSCULAR; INTRAVENOUS at 10:02

## 2022-10-03 RX ADMIN — MORPHINE SULFATE 2 MG: 2 INJECTION, SOLUTION INTRAMUSCULAR; INTRAVENOUS at 11:56

## 2022-10-03 NOTE — TELEPHONE ENCOUNTER
Pt called in to triage requesting IVF pain meds for lower back and around ribs pain rated 8/10 x 1 days. Stated last took prn pain meds this morning without relief. Denied any fevers, chills, cough, sob, chest pain, numbness or tingling or other symptoms not typical of sickle cell pain. Both sides of ribs not worse with deep breathing  Pt's last infusion was 9/29/22, last clinic visit 9/26/22 with follow-up on [id number] with Patricia Mantilla CNP.  Patient states needs ride, 25 minutes away.  Pt denied being out of home medications and needing any refills today.   Pt qualifies for sickle cell standing order protocol.  If you do not hear from the infusion center by 2pm then you will not be able to get in for an infusion today.   Please note, if you are late for your appt, you risk losing your infusion appt as it may delay another patient's infusion who arrived on time.

## 2022-10-03 NOTE — PROGRESS NOTES
Infusion Nursing Note:  Jennifer Cervantes presents today for IV fluids and pain medications.    Patient seen by provider today: No   present during visit today: Not Applicable.     Note:   Pt c/o 8/10 pain in back and right rib. Pt states pain goal is 5/10. Pt received a liter of D5.45NS at 500 ml/hr.     Pt received 2 mg IV morphine, repeated every hour for a total of 3 doses. Pt states pain has reduced to 5/10.      Intravenous Access:  Implanted Port.     Treatment Conditions:  Not Applicable.     Post Infusion Assessment:  Patient tolerated infusion without incident.  Blood return noted pre and post infusion.  Site patent and intact, free from redness, edema or discomfort.  No evidence of extravasations.  Access discontinued per protocol.      Discharge Plan:   Discharge instructions reviewed with: Patient.   Patient discharged in stable condition accompanied by: self.

## 2022-10-03 NOTE — TELEPHONE ENCOUNTER
BMT can offer apt at 0930  Called Jennifer who confirmed she can be here at 0930.  Updated BMT charge nurse.  Message sent to CCOD to schedule.  Message sent to SW to arrange transportation.     Routed to Dr. Duncan and Arely Krueger RNCC

## 2022-10-03 NOTE — LETTER
10/3/2022         RE: Jennifer Cervantes  [address]  [place] [address]        Dear Colleague,    Thank you for referring your patient, Jennifer Cervantes, to the [facility] BLOOD AND MARROW TRANSPLANT PROGRAM [place]. Please see a copy of my visit note below.    Infusion Nursing Note:  Jennifer Cervantes presents today for IV fluids and pain medications.    Patient seen by provider today: No   present during visit today: Not Applicable.     Note:   Pt c/o 8/10 pain in back and right rib. Pt states pain goal is 5/10. Pt received a liter of D5.45NS at 500 ml/hr.     Pt received 2 mg IV morphine, repeated every hour for a total of 3 doses. Pt states pain has reduced to 5/10.      Intravenous Access:  Implanted Port.     Treatment Conditions:  Not Applicable.     Post Infusion Assessment:  Patient tolerated infusion without incident.  Blood return noted pre and post infusion.  Site patent and intact, free from redness, edema or discomfort.  No evidence of extravasations.  Access discontinued per protocol.      Discharge Plan:   Discharge instructions reviewed with: Patient.   Patient discharged in stable condition accompanied by: self.      Again, thank you for allowing me to participate in the care of your patient.        Sincerely,        [facility]

## 2022-10-05 NOTE — PROGRESS NOTES
This is a recent snapshot of the patient's [facility] medical record.  For current drug dose and complete information and questions, call [phone] or In [facility] pool, fv home infusion ([id number])  CSN Number:  [id number]

## 2022-10-06 ENCOUNTER — INFUSION THERAPY VISIT (OUTPATIENT)
Dept: TRANSPLANT | Facility: CLINIC | Age: 22
End: 2022-10-06
Attending: PEDIATRICS
Payer: COMMERCIAL

## 2022-10-06 ENCOUNTER — TELEPHONE (OUTPATIENT)
Dept: ONCOLOGY | Facility: CLINIC | Age: 22
End: 2022-10-06

## 2022-10-06 ENCOUNTER — PATIENT OUTREACH (OUTPATIENT)
Dept: CARE COORDINATION | Facility: CLINIC | Age: 22
End: 2022-10-06

## 2022-10-06 VITALS
TEMPERATURE: 98.4 F | OXYGEN SATURATION: 97 % | RESPIRATION RATE: 16 BRPM | DIASTOLIC BLOOD PRESSURE: 79 MMHG | HEART RATE: 110 BPM | SYSTOLIC BLOOD PRESSURE: 123 MMHG

## 2022-10-06 DIAGNOSIS — G81.10 SPASTIC HEMIPLEGIA, UNSPECIFIED ETIOLOGY, UNSPECIFIED LATERALITY (H): Primary | ICD-10-CM

## 2022-10-06 DIAGNOSIS — D57.00 SICKLE CELL PAIN CRISIS (H): ICD-10-CM

## 2022-10-06 PROCEDURE — 96375 TX/PRO/DX INJ NEW DRUG ADDON: CPT

## 2022-10-06 PROCEDURE — 96366 THER/PROPH/DIAG IV INF ADDON: CPT

## 2022-10-06 PROCEDURE — 258N000003 HC RX IP 258 OP 636: Performed by: PEDIATRICS

## 2022-10-06 PROCEDURE — 96365 THER/PROPH/DIAG IV INF INIT: CPT

## 2022-10-06 PROCEDURE — 250N000011 HC RX IP 250 OP 636: Performed by: PEDIATRICS

## 2022-10-06 PROCEDURE — 96376 TX/PRO/DX INJ SAME DRUG ADON: CPT

## 2022-10-06 RX ORDER — ONDANSETRON 8 MG/1
8 TABLET, FILM COATED ORAL
Status: CANCELLED
Start: 2023-01-01

## 2022-10-06 RX ORDER — HEPARIN SODIUM,PORCINE 10 UNIT/ML
5 VIAL (ML) INTRAVENOUS
Status: CANCELLED | OUTPATIENT
Start: 2023-01-01

## 2022-10-06 RX ORDER — MORPHINE SULFATE 2 MG/ML
2 INJECTION, SOLUTION INTRAMUSCULAR; INTRAVENOUS
Status: CANCELLED
Start: 2023-01-01

## 2022-10-06 RX ORDER — HEPARIN SODIUM (PORCINE) LOCK FLUSH IV SOLN 100 UNIT/ML 100 UNIT/ML
5 SOLUTION INTRAVENOUS
Status: CANCELLED | OUTPATIENT
Start: 2023-01-01

## 2022-10-06 RX ORDER — DIPHENHYDRAMINE HCL 25 MG
25 CAPSULE ORAL
Status: CANCELLED
Start: 2023-01-01

## 2022-10-06 RX ORDER — MORPHINE SULFATE 2 MG/ML
2 INJECTION, SOLUTION INTRAMUSCULAR; INTRAVENOUS
Status: DISCONTINUED | OUTPATIENT
Start: 2022-10-06 | End: 2022-10-06 | Stop reason: HOSPADM

## 2022-10-06 RX ORDER — HEPARIN SODIUM (PORCINE) LOCK FLUSH IV SOLN 100 UNIT/ML 100 UNIT/ML
5 SOLUTION INTRAVENOUS
Status: DISCONTINUED | OUTPATIENT
Start: 2022-10-06 | End: 2022-10-06 | Stop reason: HOSPADM

## 2022-10-06 RX ORDER — OXYCODONE HYDROCHLORIDE 15 MG/1
15 TABLET ORAL EVERY 4 HOURS PRN
Qty: 40 TABLET | Refills: 0 | Status: SHIPPED | OUTPATIENT
Start: 2022-10-06 | End: 2022-10-13

## 2022-10-06 RX ADMIN — MORPHINE SULFATE 2 MG: 2 INJECTION, SOLUTION INTRAMUSCULAR; INTRAVENOUS at 15:00

## 2022-10-06 RX ADMIN — DEXTROSE AND SODIUM CHLORIDE 1000 ML: 5; 450 INJECTION, SOLUTION INTRAVENOUS at 13:51

## 2022-10-06 RX ADMIN — SODIUM CHLORIDE, PRESERVATIVE FREE 5 ML: 5 INJECTION INTRAVENOUS at 16:10

## 2022-10-06 RX ADMIN — MORPHINE SULFATE 2 MG: 2 INJECTION, SOLUTION INTRAMUSCULAR; INTRAVENOUS at 15:53

## 2022-10-06 RX ADMIN — MORPHINE SULFATE 2 MG: 2 INJECTION, SOLUTION INTRAMUSCULAR; INTRAVENOUS at 13:58

## 2022-10-06 ASSESSMENT — PAIN SCALES - GENERAL: PAINLEVEL: EXTREME PAIN (9)

## 2022-10-06 NOTE — TELEPHONE ENCOUNTER
PA Initiation    Medication: Deferiprone 500mg PA Pending  Insurance Company: [employer] - Phone [phone] Fax [phone]  Pharmacy Filling the Rx:    Filling Pharmacy Phone:    Filling Pharmacy Fax:    Start Date: 10/6/2022      Lizeth Chavarria CPhT  [facility]  Oncology Pharmacy [email or web address]  [phone] (phone  [phone] (fax

## 2022-10-06 NOTE — PROGRESS NOTES
Social Work Note: Telephone Call  Oncology Clinic     Data/Intervention:  Patient Name:  Jennifer Cervantes  /Age: 2000, 22 years old     Call From: [facility]        Reason for Call:  Transportation     Assessment:   called [facility] to arrange ride through patient's insurance. [place] arranged  for patient from home with [employer] ([phone]).  Patient will need to call when ready for return ride home ([phone]).      Plan:  Patient is aware of the transportation plan.  available to assist with any other needs.      CARLOS Chavez,LGSW  Hematology/Oncology Social Worker  Phone:[phone] Pager: [phone]

## 2022-10-06 NOTE — NURSING NOTE
"Oncology Rooming Note    October 6, 2022 3:20 PM   Jennifer Cervantes is a 22 year old female who presents for:    Chief Complaint   Patient presents with     Infusion     Add on infusion for IV fluids and pain medications.  Hx sickle cell disease.     Initial Vitals: /79 (BP Location: Right arm, Patient Position: Sitting, Cuff Size: Adult Regular)   Pulse 110   Temp 98.4  F (36.9  C) (Oral)   Resp 16   SpO2 97%  Estimated body mass index is 27.98 kg/m  as calculated from the following:    Height as of 9/28/22: 1.626 m (5' 4\").    Weight as of 9/28/22: 73.9 kg (163 lb). There is no height or weight on file to calculate BSA.  Extreme Pain (9) Comment: Data Unavailable   No LMP recorded. Patient has had an injection.  Allergies reviewed: Yes  Medications reviewed: Yes    Medications: oxycodone refilled and brought in to patient by pharmacist.  Pharmacy working on prescription for deferiprone and will either bring up to patient today or mail it to her house.  Pt aware of plan.   Pharmacy name entered into [facility]: [facility] [place], [place] - [address]    Clinical concerns: none     Jamaica Napoles RN              "

## 2022-10-06 NOTE — LETTER
10/6/2022         RE: Jennifer Cervantes  [address]  [place] [address]        Dear Colleague,    Thank you for referring your patient, Jennifer Cervantes, to the [facility] BLOOD AND MARROW TRANSPLANT PROGRAM [place]. Please see a copy of my visit note below.    Infusion Nursing Note:  Jennifer Cervantes presents today for IV fluids and pain medicatoins.    Patient seen by provider today: No   present during visit today: Not Applicable.    Note:   Pt received a liter of D5.45NS over 2 hours.    Pt received 2 mg morphine IV every hour for 3 doses (total of 6 mg)    Intravenous Access:  Implanted Port.    Treatment Conditions:  Not Applicable.    Post Infusion Assessment:  Patient tolerated infusion without incident.  Blood return noted pre and post infusion.  Site patent and intact, free from redness, edema or discomfort.  No evidence of extravasations.  Access discontinued per protocol.     Discharge Plan:   Discharge instructions reviewed with: Patient.  Patient discharged in stable condition accompanied by: self.  Departure Mode: Ambulatory.      Jamaica Napoles RN                          Again, thank you for allowing me to participate in the care of your patient.        Sincerely,        [facility]

## 2022-10-06 NOTE — TELEPHONE ENCOUNTER
Oncology Nurse Triage - Sickle Cell Pain Crisis:    Situation: Jennifer Cervantes (pt)calling about Sickle Cell Pain Crisis    Background:     Patient's last infusion was 10/3/22  Last clinic visit date:9/26/22  Next follow-up appt on 11/17/22 Dr. Duncan  Does patient have active treatment plan?  Yes    Assessment of Symptoms:  Onset/Duration of symptoms: 1 day    Is it typical sickle cell pain? Yes   Location: back and ribs  Character: Sharp           Intensity: 8/10    Any radiation of pain, numbness, tingling, weakness, warmth, swelling, discoloration of extremities?No     Fever?No    Chest Pain Present: No     Shortness of breath: No     Other home therapies tried: HEAT/HEATING PAD     Last home medication taken and when: 6:00am Oxycodone 15mg and other pain meds    Any Refills Needed?: Yes  Oxycodone   checked      Does patient have transportation & length of time to get to clinic: No       Grades for reference:       Grade 1 -   o Patient has active treatment plan.   o Patients last scheduled clinic appointment was attended  o Patient has not been seen in infusion or ED in the last 3 days (clarify exclusions in therapy plans)   o Afebrile   o Typically sickle cell pain   o Home medications have been tried   o No other symptoms     Recommendations:   Pending availability as of 0822    0845 offer for 1:30pm appt in BMT. Pt in agreement.    0847  Abdullahi notified to help with transportation    0851 Scheduling notified.          Please note, if you are late for your appt, you risk losing your infusion appt as it may delay another patient's infusion who arrived on time.

## 2022-10-06 NOTE — PROGRESS NOTES
Infusion Nursing Note:  Jennifer Cervantes presents today for IV fluids and pain medicatoins.    Patient seen by provider today: No   present during visit today: Not Applicable.    Note:   Pt received a liter of D5.45NS over 2 hours.    Pt received 2 mg morphine IV every hour for 3 doses (total of 6 mg)    Intravenous Access:  Implanted Port.    Treatment Conditions:  Not Applicable.    Post Infusion Assessment:  Patient tolerated infusion without incident.  Blood return noted pre and post infusion.  Site patent and intact, free from redness, edema or discomfort.  No evidence of extravasations.  Access discontinued per protocol.     Discharge Plan:   Discharge instructions reviewed with: Patient.  Patient discharged in stable condition accompanied by: self.  Departure Mode: Ambulatory.      Jamaica Napoles RN

## 2022-10-07 NOTE — TELEPHONE ENCOUNTER
Prior Authorization Approval    Authorization Effective Date: 9/6/2022  Authorization Expiration Date: 10/6/2023  Medication: Deferiprone 500mg PA- Approved  Approved Dose/Quantity: 180/30  Reference #: [id number]   Insurance Company: [employer] - Phone [phone] Fax [phone]  Expected CoPay: $0     CoPay Card Available: No, pt has a [employer] plan     Foundation Assistance Needed: No   Which Pharmacy is filling the prescription (Not needed for infusion/clinic administered): [facility] [place], [place] - [address] [place] [address] [phone]  Pharmacy Notified: No   Patient Notified: No

## 2022-10-10 ENCOUNTER — TELEPHONE (OUTPATIENT)
Dept: ONCOLOGY | Facility: CLINIC | Age: 22
End: 2022-10-10

## 2022-10-10 ENCOUNTER — PATIENT OUTREACH (OUTPATIENT)
Dept: CARE COORDINATION | Facility: CLINIC | Age: 22
End: 2022-10-10

## 2022-10-10 ENCOUNTER — INFUSION THERAPY VISIT (OUTPATIENT)
Dept: ONCOLOGY | Facility: CLINIC | Age: 22
End: 2022-10-10
Attending: PEDIATRICS
Payer: COMMERCIAL

## 2022-10-10 VITALS
TEMPERATURE: 98 F | OXYGEN SATURATION: 97 % | DIASTOLIC BLOOD PRESSURE: 84 MMHG | SYSTOLIC BLOOD PRESSURE: 118 MMHG | HEART RATE: 106 BPM | RESPIRATION RATE: 16 BRPM

## 2022-10-10 DIAGNOSIS — D57.00 SICKLE CELL PAIN CRISIS (H): ICD-10-CM

## 2022-10-10 DIAGNOSIS — G81.10 SPASTIC HEMIPLEGIA, UNSPECIFIED ETIOLOGY, UNSPECIFIED LATERALITY (H): Primary | ICD-10-CM

## 2022-10-10 PROCEDURE — 96374 THER/PROPH/DIAG INJ IV PUSH: CPT

## 2022-10-10 PROCEDURE — 250N000011 HC RX IP 250 OP 636: Performed by: PEDIATRICS

## 2022-10-10 PROCEDURE — 96360 HYDRATION IV INFUSION INIT: CPT

## 2022-10-10 PROCEDURE — 96361 HYDRATE IV INFUSION ADD-ON: CPT

## 2022-10-10 PROCEDURE — 258N000003 HC RX IP 258 OP 636: Performed by: PEDIATRICS

## 2022-10-10 PROCEDURE — 96376 TX/PRO/DX INJ SAME DRUG ADON: CPT

## 2022-10-10 RX ORDER — MORPHINE SULFATE 2 MG/ML
2 INJECTION, SOLUTION INTRAMUSCULAR; INTRAVENOUS
Status: DISCONTINUED | OUTPATIENT
Start: 2022-10-10 | End: 2022-10-10 | Stop reason: HOSPADM

## 2022-10-10 RX ORDER — ONDANSETRON 8 MG/1
8 TABLET, FILM COATED ORAL
Status: CANCELLED
Start: 2023-01-01

## 2022-10-10 RX ORDER — HEPARIN SODIUM (PORCINE) LOCK FLUSH IV SOLN 100 UNIT/ML 100 UNIT/ML
5 SOLUTION INTRAVENOUS
Status: CANCELLED | OUTPATIENT
Start: 2023-01-01

## 2022-10-10 RX ORDER — HEPARIN SODIUM (PORCINE) LOCK FLUSH IV SOLN 100 UNIT/ML 100 UNIT/ML
5 SOLUTION INTRAVENOUS
Status: DISCONTINUED | OUTPATIENT
Start: 2022-10-10 | End: 2022-10-10 | Stop reason: HOSPADM

## 2022-10-10 RX ORDER — DIPHENHYDRAMINE HCL 25 MG
25 CAPSULE ORAL
Status: CANCELLED
Start: 2023-01-01

## 2022-10-10 RX ORDER — MORPHINE SULFATE 2 MG/ML
2 INJECTION, SOLUTION INTRAMUSCULAR; INTRAVENOUS
Status: CANCELLED
Start: 2023-01-01

## 2022-10-10 RX ORDER — HEPARIN SODIUM,PORCINE 10 UNIT/ML
5 VIAL (ML) INTRAVENOUS
Status: CANCELLED | OUTPATIENT
Start: 2023-01-01

## 2022-10-10 RX ADMIN — MORPHINE SULFATE 2 MG: 2 INJECTION, SOLUTION INTRAMUSCULAR; INTRAVENOUS at 11:06

## 2022-10-10 RX ADMIN — Medication 5 ML: at 12:14

## 2022-10-10 RX ADMIN — MORPHINE SULFATE 2 MG: 2 INJECTION, SOLUTION INTRAMUSCULAR; INTRAVENOUS at 12:12

## 2022-10-10 RX ADMIN — MORPHINE SULFATE 2 MG: 2 INJECTION, SOLUTION INTRAMUSCULAR; INTRAVENOUS at 10:06

## 2022-10-10 RX ADMIN — DEXTROSE AND SODIUM CHLORIDE 1000 ML: 5; 450 INJECTION, SOLUTION INTRAVENOUS at 10:06

## 2022-10-10 ASSESSMENT — PAIN SCALES - GENERAL: PAINLEVEL: EXTREME PAIN (9)

## 2022-10-10 NOTE — PATIENT INSTRUCTIONS
[facility] Triage and after hours / weekends / holidays:  [phone]    Please call the triage or after hours line if you experience a temperature greater than or equal to 100.4, shaking chills, have uncontrolled nausea, vomiting and/or diarrhea, dizziness, shortness of breath, chest pain, bleeding, unexplained bruising, or if you have any other new/concerning symptoms, questions or concerns.      If you are having any concerning symptoms or wish to speak to a provider before your next infusion visit, please call your care coordinator or triage to notify them so we can adequately serve you.     If you need a refill on a narcotic prescription or other medication, please call before your infusion appointment.

## 2022-10-10 NOTE — TELEPHONE ENCOUNTER
Pt called in to triage requesting IVF pain meds for ribs and lower back  pain rated 10/10 x 2 days. Stated last took prn Oxycdone at 6 am  this morning without relief. Denied any fevers, chills, cough, sob, chest pain, numbness or tingling or other symptoms not typical of sickle cell pain.   Pt's last infusion was 10/6/22, last clinic visit 9/26/22 with follow-up on 11/17/22 with Patricia Mantilla .   Patient states they do not  have own ride and it will take 60 minutes long to get to cancer clinic.   Pt denied being out of home medications and needing any refills today.   Pt qualifies for sickle cell standing order protocol.  If you do not hear from the infusion center by 2pm then you will not be able to get in for an infusion today.   Please note, if you are late for your appt, you risk losing your infusion appt as it may delay another patient's infusion who arrived on time.

## 2022-10-10 NOTE — PROGRESS NOTES
Infusion Nursing Note:   Jennifer Cervantes presents today for IVF and IV Morphine.    Patient seen by provider today: No   present during visit today: Not Applicable.    Note: Jennifer comes into the clinic today doing well overall and does not report and new changes to her health or any questions or concerns. She does not attest to any SOB/chest tightness/signs of infection/dizziness/sensation changes/bruising/swelling/diarrhea/constipation/nausea/urinary issues/vision or hearing changes/fatigue. She rates her rib pain 9/10 at beginning of infusion time and 5 at the end. She states that this level of pain is tolerable for her and she feels well enough to be discharged back to home.    Intravenous Access:  Implanted Port.    Treatment Conditions:  Not Applicable.    Post Infusion Assessment:  Patient tolerated infusion without incident.  Blood return noted pre and post infusion.  Site patent and intact, free from redness, edema or discomfort.  No evidence of extravasations.  Access discontinued per protocol.     Discharge Plan:   Patient declined prescription refills.  Discharge instructions reviewed with: Patient.  Patient and/or family verbalized understanding of discharge instructions and all questions answered.  AVS to patient via [employer].  Patient will return 11/17 for next appointment.   Patient discharged in stable condition accompanied by: self.  Departure Mode: Ambulatory.      Lien Petersen RN

## 2022-10-10 NOTE — TELEPHONE ENCOUNTER
Patient called and informed that she can be seen this morning at 0930 for IVF/pain meds.    Message sent to social work to set up ride and message sent to schedulers to set up appointments.

## 2022-10-10 NOTE — PROGRESS NOTES
Social Work Note: Telephone Call  Oncology Clinic     Data/Intervention:  Patient Name:  Jennifer Cervantes  /Age: 2000, 22 years old     Call From: [facility]        Reason for Call:  Transportation     Assessment:   called [employer] to arrange ride. [employer] arranged  for patient from home with a will call ride home.  Patient will need to call when ready for return ride home ([phone]).      Plan:  Patient is aware of the transportation plan.  available to assist with any other needs.      CARLOS Chavez,[facility]  Hematology/Oncology Social Worker  Phone:[phone] Pager: [phone]

## 2022-10-11 ENCOUNTER — PATIENT OUTREACH (OUTPATIENT)
Dept: ONCOLOGY | Facility: CLINIC | Age: 22
End: 2022-10-11

## 2022-10-11 NOTE — PROGRESS NOTES
[facility]: Cancer Care                                                                                          Letter with upcoming appts sent.  Spoke with the patient by phone yesterday to let her know of MRI appts on 11/25/22.    Signature:  Arely Krueger RN

## 2022-10-11 NOTE — LETTER
October 11, 2022      TO: Jennifer NEWMAN Billy  [address]  [facility] [address]     Dear Jennifer,    Here is all of your upcoming appointments.  The most important is the MRI's on 11/25/22. If this date or time doesn't work for you, please call [phone]. Please note the MRI appointment is on the [facility] and not at the [facility].     Future Appointments 10/11/2022 - 4/9/2023      Date Visit Type Length Department Provider     10/12/2022  2:00 PM RETURN 60 min  ONCOLOGY ADULT Katherine Pierce MD    Location Instructions:     Located in the [facility] [place] at [address] [place] [address]. For parking options, enter the [facility] /arrival plaza from [facility] and attendants can assist you based on your needs.  parking is available for those with limited mobility M -F&nbsp; from 7 a.m. to 5 p.m. Due to short staffing, we are unable to offer  to all patients/visitors. Visit [email or web address] for more details.  Self-parking:&nbsp;  West Lot: Located across from the main entrance, this is a convenient option for patients. Enter on [address]. Parking attendants available most hours to assist.&nbsp;     [address] Ramp: Enter at the [facility] entrance (one block north of the [facility] main entrance). Do not enter the ramp from [facility] - this entrance is not staffed and is further from the [facility] main entrance.              11/17/2022  1:00 PM LAB 15 min [facility] LABORATORY  LAB    Location Instructions:     Located in the [facility] at [address] [place] [address]. For parking options, enter the [facility] /arrival plaza from [facility] and attendants can assist you based on your needs.  parking is available for those with limited mobility M-F from 7 a.m. to 5 p.m. Due to short staffing, we are unable to offer  to all patients/visitors. Visit [email or web address] for more details.  Self-parking:&nbsp;  West Lot: Located across from the main  entrance, this is a convenient option for patients. Enter on [address]. Parking attendants available most hours to assist.&nbsp;     [facility] Ramp: Enter at the [facility] entrance (one block north of the [facility] main entrance). Do not enter the ramp from [facility] - this entrance is not staffed and is further from the [facility] main entrance.              11/17/2022  1:30 PM RETURN 45 min [facility] ONCOLOGY ADULT Patricia Mantilla, CNP    Location Instructions:     Located in the [facility] [place] at [address] [place] [address]. For parking options, enter the [facility] /arrival plaza from [facility] and attendants can assist you based on your needs.  parking is available for those with limited mobility M -F&nbsp; from 7 a.m. to 5 p.m. Due to short staffing, we are unable to offer  to all patients/visitors. Visit [email or web address] for more details.  Self-parking:&nbsp;  West Lot: Located across from the main entrance, this is a convenient option for patients. Enter on [address]. Parking attendants available most hours to assist.&nbsp;     [facility] Ramp: Enter at the [facility] entrance (one block north of the [facility] main entrance). Do not enter the ramp from [facility] - this entrance is not staffed and is further from the [facility] main entrance.              11/17/2022  2:30 PM ONC INFUSION 2 HR (120 MIN) 30 min UC ONCOLOGY ADULT UC ONC INFUSION NURSE     11/17/2022  2:30 PM ONC INFUSION 2 HR (120 MIN) 120 min UC ATC  ONCOLOGY INFUSION CHAIR    Location Instructions:     Located in the [facility] at [address] [place] [address]. For parking options, enter the [facility] /arrival plaza from [facility] and attendants can assist you based on your needs.  parking is available for those with limited mobility M -F&nbsp; from 7 a.m. to 5 p.m. Due to short staffing, we are unable to offer  to all patients/visitors. Visit [email or web address] for more  details.  Self-parking:&nbsp;  West Lot: Located across from the main entrance, this is a convenient option for patients. Enter on [address]. Parking attendants available most hours to assist.&nbsp;     [address] Ramp: Enter at the [facility] entrance (one block north of the [facility] main entrance). Do not enter the ramp from [facility] - this entrance is not staffed and is further from the [facility] main entrance.              11/25/2022  7:00 AM MR MYOCARDIUM WO 30 min UU MRI UU CV MR NURSE     11/25/2022  7:30 AM MR MYOCARDIUM WO 75 min UU MRI UUMR4    Location Instructions:     [facility] [address] [place] [address]  Parking  parking is available on a limited basis during COVID. The  station is located at the main hospital entrance off [address]. Both  and self-parking are the same rates. Current parking options for our patients/visitors are at the Patient & Visitor Parking Ramp, located on [facility] and it is connected to the hospital via a tunnel. Reduced rates for parking in the ramps are in effect during Covid. Ticket validation is no longer needed to receive the reduced rate. Metered street parking is not available.  Entrance and check-in location Enter through the main hospital entrance off [address] or through the tunnel from the patient visitor ramp to the hospital level.&nbsp; You will be entering on Lobby Level which is 2nd floor.&nbsp; A security and information desk is located inside the entrance to provide you with a visitor pass and can direct you to your appointment location.   Check-in with the registration staff in the following imaging center areas:    GOLD WAITING AREA: Cardiac MRI, CT, Interventional radiology, Nuclear Medicine, Ultrasound and X-Ray. The Gold waiting room is located on the lobby entrance level (2nd floor) past the elevators.    PET/MRI WAITING AREA: PET scans, MRI scans  that are non-cardiac. Located on the 1st floor. Take the elevator to the first floor, follow the signs to PET/MRI              11/25/2022  8:45 AM MR ABDOMEN WO 45 min UU MRI UUMR1    Location Instructions:     [facility] [address] [place] [address]  Parking  parking is available on a limited basis during COVID. The  station is located at the main hospital entrance off [address]. Both  and self-parking are the same rates. Current parking options for our patients/visitors are at the Patient & Visitor Parking Ramp, located on [facility] and it is connected to the hospital via a tunnel. Reduced rates for parking in the ramps are in effect during Covid. Ticket validation is no longer needed to receive the reduced rate. Metered street parking is not available.  Entrance and check-in location Enter through the main hospital entrance off [address] or through the tunnel from the patient visitor ramp to the hospital level.&nbsp; You will be entering on Lobby Level which is 2nd floor.&nbsp; A security and information desk is located inside the entrance to provide you with a visitor pass and can direct you to your appointment location.   Check-in with the registration staff in the following imaging center areas:    GOLD WAITING AREA: Cardiac MRI, CT, Interventional radiology, Nuclear Medicine, Ultrasound and X-Ray. The Gold waiting room is located on the lobby entrance level (2nd floor) past the elevators.    PET/MRI WAITING AREA: PET scans, MRI scans that are non-cardiac. Located on the 1st floor. Take the elevator to the first floor, follow the signs to PET/MRI                 Sincerely,  Arely Krueger RNCC

## 2022-10-12 ENCOUNTER — ONCOLOGY VISIT (OUTPATIENT)
Dept: ONCOLOGY | Facility: CLINIC | Age: 22
End: 2022-10-12
Attending: STUDENT IN AN ORGANIZED HEALTH CARE EDUCATION/TRAINING PROGRAM
Payer: COMMERCIAL

## 2022-10-12 VITALS
DIASTOLIC BLOOD PRESSURE: 89 MMHG | TEMPERATURE: 98.3 F | SYSTOLIC BLOOD PRESSURE: 151 MMHG | HEART RATE: 108 BPM | OXYGEN SATURATION: 97 % | BODY MASS INDEX: 28.37 KG/M2 | WEIGHT: 165.3 LBS

## 2022-10-12 DIAGNOSIS — R25.2 SPASTICITY: ICD-10-CM

## 2022-10-12 DIAGNOSIS — D57.1 SICKLE CELL DISEASE WITHOUT CRISIS (H): ICD-10-CM

## 2022-10-12 DIAGNOSIS — I69.351 HEMIPLEGIA AND HEMIPARESIS FOLLOWING CEREBRAL INFARCTION AFFECTING RIGHT DOMINANT SIDE (H): ICD-10-CM

## 2022-10-12 DIAGNOSIS — G81.10 SPASTIC HEMIPLEGIA, UNSPECIFIED ETIOLOGY, UNSPECIFIED LATERALITY (H): Primary | ICD-10-CM

## 2022-10-12 DIAGNOSIS — D57.00 SICKLE CELL PAIN CRISIS (H): ICD-10-CM

## 2022-10-12 PROCEDURE — G0463 HOSPITAL OUTPT CLINIC VISIT: HCPCS

## 2022-10-12 PROCEDURE — 64644 CHEMODENERV 1 EXTREM 5/> MUS: CPT

## 2022-10-12 PROCEDURE — 64644 CHEMODENERV 1 EXTREM 5/> MUS: CPT | Performed by: STUDENT IN AN ORGANIZED HEALTH CARE EDUCATION/TRAINING PROGRAM

## 2022-10-12 PROCEDURE — 96372 THER/PROPH/DIAG INJ SC/IM: CPT | Performed by: PEDIATRICS

## 2022-10-12 PROCEDURE — 250N000020 HC RX IP 250 OP 636 J0585: Performed by: PEDIATRICS

## 2022-10-12 PROCEDURE — 95874 GUIDE NERV DESTR NEEDLE EMG: CPT | Performed by: STUDENT IN AN ORGANIZED HEALTH CARE EDUCATION/TRAINING PROGRAM

## 2022-10-12 RX ORDER — HEPARIN SODIUM (PORCINE) LOCK FLUSH IV SOLN 100 UNIT/ML 100 UNIT/ML
5 SOLUTION INTRAVENOUS
Status: CANCELLED | OUTPATIENT
Start: 2023-01-01

## 2022-10-12 RX ORDER — ONDANSETRON 8 MG/1
8 TABLET, FILM COATED ORAL
Status: CANCELLED
Start: 2023-01-01

## 2022-10-12 RX ORDER — MORPHINE SULFATE 2 MG/ML
2 INJECTION, SOLUTION INTRAMUSCULAR; INTRAVENOUS
Status: CANCELLED
Start: 2023-01-01

## 2022-10-12 RX ORDER — DIPHENHYDRAMINE HCL 25 MG
25 CAPSULE ORAL
Status: CANCELLED
Start: 2023-01-01

## 2022-10-12 RX ORDER — HEPARIN SODIUM,PORCINE 10 UNIT/ML
5 VIAL (ML) INTRAVENOUS
Status: CANCELLED | OUTPATIENT
Start: 2023-01-01

## 2022-10-12 RX ADMIN — ONABOTULINUMTOXINA 200 UNITS: 100 INJECTION, POWDER, LYOPHILIZED, FOR SOLUTION INTRADERMAL; INTRAMUSCULAR at 14:54

## 2022-10-12 ASSESSMENT — PAIN SCALES - GENERAL: PAINLEVEL: NO PAIN (0)

## 2022-10-12 NOTE — LETTER
10/12/2022         RE: Jennifer Cervantes  [address]  [facility] [address]        Dear Colleague,    Thank you for referring your patient, Jennifer Cervantes, to the [facility]. Please see a copy of my visit note below.    PM&R Botox Procedure Note    Chief Complaint: Spastic Hemiparesis    There were no vitals taken for this visit.       Current Outpatient Medications:      acetaminophen (TYLENOL) 325 MG tablet, Take 2 tablets (650 mg) by mouth every 6 hours as needed for mild pain (Patient not taking: No sig reported), Disp: 120 tablet, Rfl: 3     albuterol (PROVENTIL) (2.5 MG/3ML) 0.083% neb solution, Take 2 vials (5 mg) by nebulization every 6 hours as needed for shortness of breath / dyspnea or wheezing, Disp: 90 mL, Rfl: 3     aspirin (ASA) 81 MG chewable tablet, Take 1 tablet (81 mg) by mouth 2 times daily, Disp: 60 tablet, Rfl: 11     budesonide-formoterol (SYMBICORT) 160-4.5 MCG/ACT Inhaler, Inhale 2 puffs into the lungs 2 times daily, Disp: 10.2 g, Rfl: 3     deferasirox (JADENU) 360 MG tablet, Take 4 tablets (1,440 mg) by mouth every evening, Disp: 120 tablet, Rfl: 4     Deferiprone, Twice Daily, 1000 MG TABS, Take 1,500 mg by mouth 2 times daily, Disp: 60 tablet, Rfl: 1     EPINEPHrine (ANY BX GENERIC EQUIV) 0.3 MG/0.3ML injection 2-pack, Inject 0.3 mLs (0.3 mg) into the muscle as needed for anaphylaxis, Disp: 1 each, Rfl: 1     Hydroxyurea 1000 MG TABS, Take 3,000 mg by mouth daily, Disp: 90 tablet, Rfl: 3     ondansetron (ZOFRAN) 8 MG tablet, Take 1 tablet (8 mg) by mouth every 8 hours as needed, Disp: 30 tablet, Rfl: 1     oxyCODONE IR (ROXICODONE) 15 MG tablet, Take 1 tablet (15 mg) by mouth every 4 hours as needed for severe pain Goal 4 per day. Max 6 per day., Disp: 40 tablet, Rfl: 0    Current Facility-Administered Medications:      medroxyPROGESTERone (DEPO-PROVERA) injection 150 mg, 150 mg, Intramuscular, Q90 Days, Suraj Case MD, 150 mg at 09/28/22  1039        Allergies   Allergen Reactions     Contrast Dye      Hives and breathing issues     Fish-Derived Products Hives     Seafood Hives     Diagnostic X-Ray Materials      Gadolinium         PHYSICAL EXAM  Motor: 4+/5 with right shoulder abduction, 4/5 with right elbow flexion, unable to assess wrist flexion due to contracture. 4/5 with  strength on right. 5/5 left shoulder abduction, elbow extension, wrist extension and  strength/finger intrinsics. 4/5 with right hip flexion, 4/5 with right knee extension, 3/5 with right ankle dorsiflexion, 4+/5 with right EHL, plantar flexion. 5/5 with all muscle groups in left lower extremity.  ROM is 120 degrees with right shoulder abduction, about 130 degrees with right elbow extension.   Tone with MAS- 2/4 with right shoulder abduction, 3/4 with right finger flexors/intrinsics, 2/4 with right knee flexion. Significant right wrist contracture with minimal extension.  Sensory: intact to light touch throughout all dermatomes in bilateral lower extremities.      HPI  Last set of injections were on 7/15/22.  Patient has been ok since her last visit. Continues with range of motion exercises at home.   RESPONSE TO PREVIOUS TREATMENT:  Patient reports significant noticeable improvement in right upper and lower extremity range of motion since her last visit. About 60%. She states that she has not noticed increased wearing off of the effects of the medication.    BOTULINUM NEUROTOXIN INJECTION PROCEDURES:    VERIFICATION OF PATIENT IDENTIFICATION  Responsible Person:  RUIZ MARTIN: verified  Full Name: verified    INDICATIONS FOR PROCEDURES:  Jennifer Cervantes is a 21 year old patient with spasticity affecting the right upper extremity and right lower extremity secondary to a diagnosis of sickle cell disease and previous CVA with resulting spastic hemiparesis with associated pain, loss of joint motion, loss of volitional motor control, hygiene difficulty, difficulty with activities  of daily living and difficulty with ambulation and transfers.    Her baseline symptoms have been recalcitrant to oral medications and conservative therapy.  She is here today for reinjection with Botox.    GOAL OF PROCEDURE:  The goal of this procedure is to improve increase active range of motion, improve volitional motor control, decrease pain  and enhance functional independence associated with spasticity.    TOTAL DOSE ADMINISTERED:  Dose Administered:  200 units Botox (Botulinum Toxin Type A)       1:1 Dilution     Diluent Used:  Preservative Free Normal Saline  Total Volume of Diluent Used:  2 ml      CONSENT:  The risks, benefits, and treatment options were discussed with Jennifer Cervantes and she agreed to proceed.    EQUIPMENT USED:  Needle-37mm stimulating/recording  EMG/NCS Machine    SKIN PREPARATION:  Skin preparation was performed using an alcohol wipe.      GUIDANCE DESCRIPTION:  Electro-myographic guidance was necessary throughout the procedure to accurately identify all areas of spastic muscles while avoiding injection of non-spastic muscles and underlying muscles , neighboring nerves and nearby vascular structures.     AREA/MUSCLE INJECTED:  Right biceps- 40 U at 2 sites  Right BR- 30 U  Right pronator teres- 30 U  Right FDS- 25 U  Right FCR- 25 U  Right biceps femoris- 50 U at 2 sites    RESPONSE TO PROCEDURE:  Jennifer Cervantes tolerated the procedure well and there were no immediate complications. She was allowed to recover for an appropriate period of time and was discharged home in stable condition.       Jennifer Cervantes will follow up by phone with Dr. Pierce for any questions or concerns that may arise.  Jennifer Cervantes will be scheduled for the next series of injections in 12 weeks.        Again, thank you for allowing me to participate in the care of your patient.      Sincerely,    Katherine Pierce MD

## 2022-10-12 NOTE — PATIENT INSTRUCTIONS
You received your next set of botox injections today.  You will be scheduled for your next set of injections in 12 weeks.

## 2022-10-12 NOTE — NURSING NOTE
"Oncology Rooming Note    October 12, 2022 2:24 PM   Jennifer Cervantes is a 22 year old female who presents for:    Chief Complaint   Patient presents with     Oncology Clinic Visit     Rtn for right sided weakness     Initial Vitals: BP (!) 151/89 (BP Location: Left arm, Patient Position: Sitting, Cuff Size: Adult Regular)   Pulse 108   Temp 98.3  F (36.8  C) (Oral)   Wt 75 kg (165 lb 4.8 oz)   SpO2 97%   BMI 28.37 kg/m   Estimated body mass index is 28.37 kg/m  as calculated from the following:    Height as of 9/28/22: 1.626 m (5' 4\").    Weight as of this encounter: 75 kg (165 lb 4.8 oz). Body surface area is 1.84 meters squared.  No Pain (0) Comment: Data Unavailable   No LMP recorded. Patient has had an injection.  Allergies reviewed: Yes  Medications reviewed: Yes    Medications: Medication refills not needed today.  Pharmacy name entered into [facility]: [place], [place] - [address] [place] [address] [phone]    Clinical concerns: Patient has no specific questions or clinical concerns outside of the reason for the visit.       Yadi Welch, EMT            "

## 2022-10-12 NOTE — PROGRESS NOTES
PM&R Botox Procedure Note    Chief Complaint: Spastic Hemiparesis    There were no vitals taken for this visit.       Current Outpatient Medications:      acetaminophen (TYLENOL) 325 MG tablet, Take 2 tablets (650 mg) by mouth every 6 hours as needed for mild pain (Patient not taking: No sig reported), Disp: 120 tablet, Rfl: 3     albuterol (PROVENTIL) (2.5 MG/3ML) 0.083% neb solution, Take 2 vials (5 mg) by nebulization every 6 hours as needed for shortness of breath / dyspnea or wheezing, Disp: 90 mL, Rfl: 3     aspirin (ASA) 81 MG chewable tablet, Take 1 tablet (81 mg) by mouth 2 times daily, Disp: 60 tablet, Rfl: 11     budesonide-formoterol (SYMBICORT) 160-4.5 MCG/ACT Inhaler, Inhale 2 puffs into the lungs 2 times daily, Disp: 10.2 g, Rfl: 3     deferasirox (JADENU) 360 MG tablet, Take 4 tablets (1,440 mg) by mouth every evening, Disp: 120 tablet, Rfl: 4     Deferiprone, Twice Daily, 1000 MG TABS, Take 1,500 mg by mouth 2 times daily, Disp: 60 tablet, Rfl: 1     EPINEPHrine (ANY BX GENERIC EQUIV) 0.3 MG/0.3ML injection 2-pack, Inject 0.3 mLs (0.3 mg) into the muscle as needed for anaphylaxis, Disp: 1 each, Rfl: 1     Hydroxyurea 1000 MG TABS, Take 3,000 mg by mouth daily, Disp: 90 tablet, Rfl: 3     ondansetron (ZOFRAN) 8 MG tablet, Take 1 tablet (8 mg) by mouth every 8 hours as needed, Disp: 30 tablet, Rfl: 1     oxyCODONE IR (ROXICODONE) 15 MG tablet, Take 1 tablet (15 mg) by mouth every 4 hours as needed for severe pain Goal 4 per day. Max 6 per day., Disp: 40 tablet, Rfl: 0    Current Facility-Administered Medications:      medroxyPROGESTERone (DEPO-PROVERA) injection 150 mg, 150 mg, Intramuscular, Q90 Days, Suraj Case MD, 150 mg at 09/28/22 1039        Allergies   Allergen Reactions     Contrast Dye      Hives and breathing issues     Fish-Derived Products Hives     Seafood Hives     Diagnostic X-Ray Materials      Gadolinium         PHYSICAL EXAM  Motor: 4+/5 with right shoulder abduction,  4/5 with right elbow flexion, unable to assess wrist flexion due to contracture. 4/5 with  strength on right. 5/5 left shoulder abduction, elbow extension, wrist extension and  strength/finger intrinsics. 4/5 with right hip flexion, 4/5 with right knee extension, 3/5 with right ankle dorsiflexion, 4+/5 with right EHL, plantar flexion. 5/5 with all muscle groups in left lower extremity.  ROM is 120 degrees with right shoulder abduction, about 130 degrees with right elbow extension.   Tone with MAS- 2/4 with right shoulder abduction, 3/4 with right finger flexors/intrinsics, 2/4 with right knee flexion. Significant right wrist contracture with minimal extension.  Sensory: intact to light touch throughout all dermatomes in bilateral lower extremities.      HPI  Last set of injections were on 7/15/22.  Patient has been ok since her last visit. Continues with range of motion exercises at home.   RESPONSE TO PREVIOUS TREATMENT:  Patient reports significant noticeable improvement in right upper and lower extremity range of motion since her last visit. About 60%. She states that she has not noticed increased wearing off of the effects of the medication.    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    Her baseline symptoms have been recalcitrant to oral medications and conservative therapy.  She is here today for reinjection with Botox.    GOAL OF PROCEDURE:  The goal of this procedure  is to improve increase active range of motion, improve volitional motor control, decrease pain  and enhance functional independence associated with spasticity.    TOTAL DOSE ADMINISTERED:  Dose Administered:  200 units Botox (Botulinum Toxin Type A)       1:1 Dilution     Diluent Used:  Preservative Free Normal Saline  Total Volume of Diluent Used:  2 ml      CONSENT:  The risks, benefits, and treatment options were discussed with Jennifer Cervantes and she agreed to proceed.    EQUIPMENT USED:  Needle-37mm stimulating/recording  EMG/NCS Machine    SKIN PREPARATION:  Skin preparation was performed using an alcohol wipe.      GUIDANCE DESCRIPTION:  Electro-myographic guidance was necessary throughout the procedure to accurately identify all areas of spastic muscles while avoiding injection of non-spastic muscles and underlying muscles , neighboring nerves and nearby vascular structures.     AREA/MUSCLE INJECTED:  Right biceps- 40 U at 2 sites  Right BR- 30 U  Right pronator teres- 30 U  Right FDS- 25 U  Right FCR- 25 U  Right biceps femoris- 50 U at 2 sites    RESPONSE TO PROCEDURE:  Jennifer Cervantes tolerated the procedure well and there were no immediate complications. She was allowed to recover for an appropriate period of time and was discharged home in stable condition.       Jennifer Cervantes will follow up by phone with Dr. Pierce for any questions or concerns that may arise.  Jennifer Cervantes will be scheduled for the next series of injections in 12 weeks.

## 2022-10-13 ENCOUNTER — TELEPHONE (OUTPATIENT)
Dept: ONCOLOGY | Facility: CLINIC | Age: 22
End: 2022-10-13

## 2022-10-13 DIAGNOSIS — D57.00 SICKLE CELL PAIN CRISIS (H): ICD-10-CM

## 2022-10-13 RX ORDER — OXYCODONE HYDROCHLORIDE 15 MG/1
15 TABLET ORAL EVERY 4 HOURS PRN
Qty: 40 TABLET | Refills: 0 | Status: SHIPPED | OUTPATIENT
Start: 2022-10-13 | End: 2022-10-21

## 2022-10-13 NOTE — PROGRESS NOTES
This is a recent snapshot of the patient's [facility] medical record.  For current drug dose and complete information and questions, call [phone] or In Basket pool, [facility] home infusion ([id number])  CSN Number:  [id number]

## 2022-10-13 NOTE — TELEPHONE ENCOUNTER
Pt called in to triage requesting IVF pain meds for around back and rib cage, doesn't hurt to take a deep breath, pain rated 10/10 x 2 days. Stated last took prn oxcodone at 0600 this morning without relief. Denied any fevers, chills, cough, sob, chest pain, numbness or tingling or other symptoms not typical of sickle cell pain.   Pt's last infusion was 10/10, last clinic visit 9/26/22 with follow-up on 11/17/22 with Patricia Mantilla CNP.   Patient states she needs ride, 25 minutes     Pt requesting the following refills: hydroxyurea, aspirin, oxycodone; informed pt that she has refills on file for hydroxurea and aspirin and can call pharmacy to ask them to refill RX. Oxycodone pended up in separate encounter    Pt qualifies for sickle cell standing order protocol.  If you do not hear from the infusion center by 2pm then you will not be able to get in for an infusion today.     Added to infusion wait list.

## 2022-10-13 NOTE — TELEPHONE ENCOUNTER
Narcotic Refill Request    Medication(s) requested:  Oxycodone IR 15 MG tablet  Person Requesting Refill:Jennifer  What pain is the medication treating: sickle cell pain  How is the medication being taken?: takes it q4h  Does pt have enough for today? yes  Is pain being adequately controlled on the current regimen?: yes  Experiencing any side effects from medication?: no    Date of most recent appointment:  9/26/22 with Dr. Duncan  Any No Show Visits:No  Next appointment:   11/17/22 with Patricia Mantilla  Last fill date and by whom:  10/6/22 by Patricia Mantilla CNP   Reviewed: No    Routed/Paged provider: Pended and Routed to Patricia Mantilla CNP

## 2022-10-13 NOTE — TELEPHONE ENCOUNTER
Jennifer is calling to see if there is an available apt.  Informed pt that as of this time, there is not an available apt.  Pt advised to use ED if sx worsen.  Pt informed we would call her back if an apt opened up.

## 2022-10-14 ENCOUNTER — PATIENT OUTREACH (OUTPATIENT)
Dept: ONCOLOGY | Facility: CLINIC | Age: 22
End: 2022-10-14

## 2022-10-14 ENCOUNTER — TELEPHONE (OUTPATIENT)
Dept: ONCOLOGY | Facility: CLINIC | Age: 22
End: 2022-10-14

## 2022-10-14 ENCOUNTER — PATIENT OUTREACH (OUTPATIENT)
Dept: CARE COORDINATION | Facility: CLINIC | Age: 22
End: 2022-10-14

## 2022-10-14 ENCOUNTER — INFUSION THERAPY VISIT (OUTPATIENT)
Dept: ONCOLOGY | Facility: CLINIC | Age: 22
End: 2022-10-14
Attending: PEDIATRICS
Payer: COMMERCIAL

## 2022-10-14 VITALS
HEART RATE: 103 BPM | TEMPERATURE: 98.2 F | SYSTOLIC BLOOD PRESSURE: 127 MMHG | DIASTOLIC BLOOD PRESSURE: 85 MMHG | OXYGEN SATURATION: 96 %

## 2022-10-14 DIAGNOSIS — G81.10 SPASTIC HEMIPLEGIA, UNSPECIFIED ETIOLOGY, UNSPECIFIED LATERALITY (H): Primary | ICD-10-CM

## 2022-10-14 DIAGNOSIS — D57.00 SICKLE CELL PAIN CRISIS (H): ICD-10-CM

## 2022-10-14 DIAGNOSIS — D57.1 SICKLE CELL DISEASE WITHOUT CRISIS (H): ICD-10-CM

## 2022-10-14 DIAGNOSIS — E83.111 IRON OVERLOAD DUE TO REPEATED RED BLOOD CELL TRANSFUSIONS: Primary | ICD-10-CM

## 2022-10-14 PROCEDURE — 250N000011 HC RX IP 250 OP 636: Performed by: PEDIATRICS

## 2022-10-14 PROCEDURE — 258N000003 HC RX IP 258 OP 636: Performed by: PEDIATRICS

## 2022-10-14 PROCEDURE — 96361 HYDRATE IV INFUSION ADD-ON: CPT

## 2022-10-14 PROCEDURE — 96376 TX/PRO/DX INJ SAME DRUG ADON: CPT

## 2022-10-14 PROCEDURE — 96374 THER/PROPH/DIAG INJ IV PUSH: CPT

## 2022-10-14 RX ORDER — MORPHINE SULFATE 2 MG/ML
2 INJECTION, SOLUTION INTRAMUSCULAR; INTRAVENOUS
Status: CANCELLED
Start: 2023-01-01

## 2022-10-14 RX ORDER — MORPHINE SULFATE 2 MG/ML
2 INJECTION, SOLUTION INTRAMUSCULAR; INTRAVENOUS
Status: COMPLETED | OUTPATIENT
Start: 2022-10-14 | End: 2022-10-14

## 2022-10-14 RX ORDER — HEPARIN SODIUM (PORCINE) LOCK FLUSH IV SOLN 100 UNIT/ML 100 UNIT/ML
5 SOLUTION INTRAVENOUS
Status: DISCONTINUED | OUTPATIENT
Start: 2022-10-14 | End: 2022-10-14 | Stop reason: HOSPADM

## 2022-10-14 RX ORDER — HEPARIN SODIUM,PORCINE 10 UNIT/ML
5 VIAL (ML) INTRAVENOUS
Status: CANCELLED | OUTPATIENT
Start: 2023-01-01

## 2022-10-14 RX ORDER — ONDANSETRON 8 MG/1
8 TABLET, FILM COATED ORAL
Status: CANCELLED
Start: 2023-01-01

## 2022-10-14 RX ORDER — HEPARIN SODIUM (PORCINE) LOCK FLUSH IV SOLN 100 UNIT/ML 100 UNIT/ML
5 SOLUTION INTRAVENOUS
Status: CANCELLED | OUTPATIENT
Start: 2023-01-01

## 2022-10-14 RX ORDER — DIPHENHYDRAMINE HCL 25 MG
25 CAPSULE ORAL
Status: CANCELLED
Start: 2023-01-01

## 2022-10-14 RX ADMIN — MORPHINE SULFATE 2 MG: 2 INJECTION, SOLUTION INTRAMUSCULAR; INTRAVENOUS at 09:46

## 2022-10-14 RX ADMIN — DEXTROSE AND SODIUM CHLORIDE 1000 ML: 5; 450 INJECTION, SOLUTION INTRAVENOUS at 09:46

## 2022-10-14 RX ADMIN — Medication 5 ML: at 11:51

## 2022-10-14 RX ADMIN — MORPHINE SULFATE 2 MG: 2 INJECTION, SOLUTION INTRAMUSCULAR; INTRAVENOUS at 10:44

## 2022-10-14 RX ADMIN — MORPHINE SULFATE 2 MG: 2 INJECTION, SOLUTION INTRAMUSCULAR; INTRAVENOUS at 11:46

## 2022-10-14 NOTE — PROGRESS NOTES
[facility]: Cancer Care                                                                                          Pt had been scheduled for a cardiac and abdominal MRI on 11/25/22, but the Cardiac MRI dept will be closed that day.  Pt already had appts for 11/17 scheduled at the [facility].  MRI was able to get her in at [facility] in the morning of the 17th for the 2 MRI's and then she will take the shuttle over to the [facility] in the afternoon for her lab, ANDRE[facility] and infusion appts.  Pt agrees with plan and IB sent to  to help arrange a ride for the day.      Signature:  Arely Krueger RN

## 2022-10-14 NOTE — LETTER
October 14, 2022      TO: Jennifer NEWMAN Billy  [address]  [facility] [address]         Dear Jennifer,      Here is the information for 11/17/22.  You will need to check in by 8am at the [facility].  Abdullahi the  is working on getting the ride set up for that day.  She will be in touch when she gets that set.       Future Appointments   Date Time Provider Department Center   11/17/2022  8:30 AM UUM[facility]1 [facility]- Abdominal MRI [place]   11/17/2022 10:45 AM UUM[facility]4 [facility]- Cardiac MRI [place]   11/17/2022  1:00 PM  LAB [facility]   11/17/2022  1:30 PM Patricia Mantilla, CNP [facility]   11/17/2022  2:30 PM  ONC INFUSION NURSE [facility]           Sincerely,      Arely Krueger RNCC

## 2022-10-14 NOTE — PATIENT INSTRUCTIONS
[facility] Triage and after hours / weekends / holidays:  [phone]    Please call the triage or after hours line if you experience a temperature greater than or equal to 100.4, shaking chills, have uncontrolled nausea, vomiting and/or diarrhea, dizziness, shortness of breath, chest pain, bleeding, unexplained bruising, or if you have any other new/concerning symptoms, questions or concerns.      If you are having any concerning symptoms or wish to speak to a provider before your next infusion visit, please call your care coordinator or triage to notify them so we can adequately serve you.     If you need a refill on a narcotic prescription or other medication, please call before your infusion appointment.                October 2022 Sunday Monday Tuesday Wednesday Thursday Friday Saturday                                 1       2     3    BMT INFUSION 2 HR (120 MIN)   9:30 AM   (120 min.)    BMT INFUSION NURSE   [facility] 4     5     6    BMT INFUSION 2 HR (120 MIN)   1:30 PM   (120 min.)    BMT INFUSION NURSE   [facility] 7     8       9     10    ONC INFUSION 2 HR (120 MIN)   9:30 AM   (120 min.)    ONC INFUSION NURSE   [facility] 11     12    RETURN   1:45 PM   (60 min.)   Katherine Pierce MD   [facility] 13     14    ONC INFUSION 4 HR (240 MIN)   9:30 AM   (240 min.)    ONC INFUSION NURSE   [facility] 15       16     17     18     19     20     21     22       23     24     25     26     27     28     29       30     31 November 2022 Sunday Monday Tuesday Wednesday Thursday Friday Saturday             1     2     3     4     5       6     7     8     9     10     11     12       13     14     15     16     17    LAB   1:00 PM   (15 min.)   [facility]     [facility] [place]    RETURN   1:15 PM   (45 min.)   Patricia Mantilla CNP   [facility]    ONC INFUSION 2 HR (120 MIN)   2:30 PM   (120 min.)    ONC INFUSION NURSE   [facility] 18     19       20     21     22     23     24     25    MR MYOCARDIUM WO   7:00 AM   (105 min.)   UUMR4   [facility]    MR ABDOMEN WO   8:15 AM   (45 min.)   UUMR1   [facility] 26       27     28     29     30                                      Lab Results:  No results found for this or any previous visit (from the past 12 hour(s)).

## 2022-10-14 NOTE — TELEPHONE ENCOUNTER
Pt called in to triage requesting IVF pain meds for lower back pain rated 8/10 x 3 days. Stated last took prn oxycodone at 6am  this morning without relief. Denied any fevers, chills, cough, sob, chest pain, numbness or tingling or other symptoms not typical of sickle cell pain.     Pt's last infusion was 10/10/22, last clinic visit 9/26/22 Dr Duncan  with follow-up on 11/17/22 with Patricia Mantilla .     Patient states they do not have own ride and it will take 60 minutes long to get to cancer clinic.     Pt denied being out of home medications and needing any refills today.     Pt qualifies for sickle cell standing order protocol.  If you do not hear from the infusion center by 2pm then you will not be able to get in for an infusion today.     Please note, if you are late for your appt, you risk losing your infusion appt as it may delay another patient's infusion who arrived on time.     Ange can take patient at 9:30 for IVF/pain meds, appointment time confirmed with Jennifer.     Message sent to social work to assist with transportation and to CCOD to schedule appointment.

## 2022-10-14 NOTE — PROGRESS NOTES
Infusion Nursing Note:  Jennifer Cervantes presents today for IV fluids and pain medication.    Patient seen by provider today: No   present during visit today: Not Applicable.    Note: Pt presents today with 8/10 low back pain. IV morphine administered x3 per therapy plan. Pt states her pain improved to 4/10 by the conclusion of infusion appointment.    Intravenous Access:  Implanted Port.    Treatment Conditions:  Not Applicable.    Discharge Plan:   Patient declined prescription refills.  Discharge instructions reviewed with: Patient.  Patient and/or family verbalized understanding of discharge instructions and all questions answered.  AVS to patient via [employer].  Patient will return 11/17 for next appointment.   Patient discharged in stable condition accompanied by: self.  Departure Mode: Ambulatory.      Emily Meese, RN

## 2022-10-14 NOTE — PROGRESS NOTES
Social Work Note: Telephone Call  Oncology Clinic     Data/Intervention:  Patient Name:  Jennifer Cervantes  /Age: 2000, 22 years old     Call From: [facility]        Reason for Call:  Transportation     Assessment:   called [employer] to arrange ride. [employer] arranged  for patient from home with a will call ride.  Patient will need to call when ready for return ride home ([phone]).      Plan:  Patient is aware of the transportation plan.  available to assist with any other needs.      CARLOS Chavez,SW  Hematology/Oncology Social Worker  Phone:[phone] Pager: [phone]

## 2022-10-17 ENCOUNTER — TELEPHONE (OUTPATIENT)
Dept: ONCOLOGY | Facility: CLINIC | Age: 22
End: 2022-10-17

## 2022-10-17 ENCOUNTER — INFUSION THERAPY VISIT (OUTPATIENT)
Dept: ONCOLOGY | Facility: CLINIC | Age: 22
End: 2022-10-17
Attending: INTERNAL MEDICINE
Payer: COMMERCIAL

## 2022-10-17 ENCOUNTER — PATIENT OUTREACH (OUTPATIENT)
Dept: CARE COORDINATION | Facility: CLINIC | Age: 22
End: 2022-10-17

## 2022-10-17 VITALS
OXYGEN SATURATION: 96 % | SYSTOLIC BLOOD PRESSURE: 114 MMHG | DIASTOLIC BLOOD PRESSURE: 79 MMHG | TEMPERATURE: 98.3 F | RESPIRATION RATE: 16 BRPM | HEART RATE: 99 BPM

## 2022-10-17 DIAGNOSIS — D57.00 SICKLE CELL PAIN CRISIS (H): ICD-10-CM

## 2022-10-17 DIAGNOSIS — G81.10 SPASTIC HEMIPLEGIA, UNSPECIFIED ETIOLOGY, UNSPECIFIED LATERALITY (H): Primary | ICD-10-CM

## 2022-10-17 PROCEDURE — 96376 TX/PRO/DX INJ SAME DRUG ADON: CPT

## 2022-10-17 PROCEDURE — 250N000011 HC RX IP 250 OP 636: Performed by: PEDIATRICS

## 2022-10-17 PROCEDURE — 96361 HYDRATE IV INFUSION ADD-ON: CPT

## 2022-10-17 PROCEDURE — 258N000003 HC RX IP 258 OP 636: Performed by: PEDIATRICS

## 2022-10-17 PROCEDURE — 96374 THER/PROPH/DIAG INJ IV PUSH: CPT

## 2022-10-17 RX ORDER — MORPHINE SULFATE 2 MG/ML
2 INJECTION, SOLUTION INTRAMUSCULAR; INTRAVENOUS
Status: CANCELLED
Start: 2023-01-01

## 2022-10-17 RX ORDER — HEPARIN SODIUM (PORCINE) LOCK FLUSH IV SOLN 100 UNIT/ML 100 UNIT/ML
5 SOLUTION INTRAVENOUS
Status: DISCONTINUED | OUTPATIENT
Start: 2022-10-17 | End: 2022-10-17 | Stop reason: HOSPADM

## 2022-10-17 RX ORDER — ONDANSETRON 8 MG/1
8 TABLET, FILM COATED ORAL
Status: CANCELLED
Start: 2023-01-01

## 2022-10-17 RX ORDER — HEPARIN SODIUM,PORCINE 10 UNIT/ML
5 VIAL (ML) INTRAVENOUS
Status: CANCELLED | OUTPATIENT
Start: 2023-01-01

## 2022-10-17 RX ORDER — DIPHENHYDRAMINE HCL 25 MG
25 CAPSULE ORAL
Status: CANCELLED
Start: 2023-01-01

## 2022-10-17 RX ORDER — MORPHINE SULFATE 2 MG/ML
2 INJECTION, SOLUTION INTRAMUSCULAR; INTRAVENOUS
Status: COMPLETED | OUTPATIENT
Start: 2022-10-17 | End: 2022-10-17

## 2022-10-17 RX ORDER — HEPARIN SODIUM (PORCINE) LOCK FLUSH IV SOLN 100 UNIT/ML 100 UNIT/ML
5 SOLUTION INTRAVENOUS
Status: CANCELLED | OUTPATIENT
Start: 2023-01-01

## 2022-10-17 RX ADMIN — DEXTROSE AND SODIUM CHLORIDE 1000 ML: 5; 450 INJECTION, SOLUTION INTRAVENOUS at 13:04

## 2022-10-17 RX ADMIN — MORPHINE SULFATE 2 MG: 2 INJECTION, SOLUTION INTRAMUSCULAR; INTRAVENOUS at 13:04

## 2022-10-17 RX ADMIN — MORPHINE SULFATE 2 MG: 2 INJECTION, SOLUTION INTRAMUSCULAR; INTRAVENOUS at 15:04

## 2022-10-17 RX ADMIN — MORPHINE SULFATE 2 MG: 2 INJECTION, SOLUTION INTRAMUSCULAR; INTRAVENOUS at 14:11

## 2022-10-17 RX ADMIN — Medication 5 ML: at 15:22

## 2022-10-17 NOTE — TELEPHONE ENCOUNTER
Pt called in to triage requesting IVF pain meds for generalized body pain rated 8/10 x 2 days. Stated last took prn oxycodone at 6am this morning without relief. Denied any fevers, chills, cough, sob, chest pain, numbness or tingling or other symptoms not typical of sickle cell pain.     Pt's last infusion was 10/14/22, last clinic visit 9/26/22 Dr Duncan  with follow-up on 11/17/22 with Patricia Mantilla .     Patient states they do not have own ride and it will take 60 minutes long to get to cancer clinic.     Pt denied being out of home medications and needing any refills today.     Pt qualifies for sickle cell standing order protocol.    If you do not hear from the infusion center by 2pm then you will not be able to get in for an infusion today.     Please note, if you are late for your appt, you risk losing your infusion appt as it may delay another patient's infusion who arrived on time.

## 2022-10-17 NOTE — PROGRESS NOTES
Infusion Nursing Note:  Jennifer Cervantes presents today for IVF/Pain medication.    Patient seen by provider today: No   present during visit today: Not Applicable.    Note: Patient reported to clinic today with c/o generalized sickle cell pain. She rated it at a 9 and after IVF and pain medication it decreased to 2. Patient declined zofran and benadryl today.    Intravenous Access:  Implanted Port.    Treatment Conditions:  Not Applicable.    Post Infusion Assessment:  Patient tolerated infusion without incident.  Blood return noted pre and post infusion.  Site patent and intact, free from redness, edema or discomfort.  No evidence of extravasations.  Access discontinued per protocol.     Discharge Plan:   Patient declined prescription refills.  Discharge instructions reviewed with: Patient.  Patient and/or family verbalized understanding of discharge instructions and all questions answered.  Copy of AVS reviewed with patient and/or family.  Patient will return 11/17/22 for next appointment.  Patient discharged in stable condition accompanied by: self.  Departure Mode: Ambulatory.  Face to Face time: 5 minutes.    Aravind Feldman RN

## 2022-10-17 NOTE — PATIENT INSTRUCTIONS
[facility] Main Line: [phone]    Call triage nurse with chills and/or temperature greater than or equal to 100.4, uncontrolled nausea/vomiting, diarrhea, constipation, dizziness, shortness of breath, chest pain, bleeding, unexplained bruising, or any new/concerning symptoms, questions/concerns.   If you are having any concerning symptoms or wish to speak to a provider before your next infusion visit, please call your care coordinator or triage to notify them so we can adequately serve you.   Nurse Triage line:  [phone]    If after hours, weekends, or holidays, call main hospital  and ask for Oncology doctor on call @ [phone]     October 2022 Sunday Monday Tuesday Wednesday Thursday Friday Saturday                                 1       2     3    BMT INFUSION 2 HR (120 MIN)   9:30 AM   (120 min.)    BMT INFUSION NURSE   [facility] 4     5     6    BMT INFUSION 2 HR (120 MIN)   1:30 PM   (120 min.)    BMT INFUSION NURSE   [facility] 7     8       9     10    ONC INFUSION 2 HR (120 MIN)   9:30 AM   (120 min.)    ONC INFUSION NURSE   [facility] 11     12    RETURN   1:45 PM   (60 min.)   Katherine Pierce MD   [facility] 13     14    ONC INFUSION 4 HR (240 MIN)   9:30 AM   (240 min.)    ONC INFUSION NURSE   [facility] 15       16     17    ONC INFUSION 2 HR (120 MIN)   1:00 PM   (120 min.)    ONC INFUSION NURSE   [facility] 18     19     20     21     22       23     24     25     26     27     28     29 30 31 November 2022 Sunday Monday Tuesday Wednesday Thursday Friday Saturday             1     2     3     4     5       6     7     8     9     10     11     12       13     14      15     16     17    MR ABDOMEN WO   8:00 AM   (45 min.)   UUMR1   [facility]    MR MYOCARDIUM WO  10:15 AM   (75 min.)   UUMR4   [facility]    LAB   1:00 PM   (15 min.)   [facility]   [facility] [place]    RETURN   1:15 PM   (45 min.)   Patricia Mantilla CNP   [facility]    ONC INFUSION 2 HR (120 MIN)   2:30 PM   (120 min.)    ONC INFUSION NURSE   [facility] 18     19       20     21     22     23     24     25     26       27     28     29     30                                      Lab Results:  No results found for this or any previous visit (from the past 12 hour(s)).

## 2022-10-17 NOTE — TELEPHONE ENCOUNTER
1128 this writer spoke to Jennifer offered the 1:00pm with [facility] for IVF/Pain. Jennifer in agreement and needs transport.     1130  paged to assist with transportation    1131 Scheduling request sent.

## 2022-10-17 NOTE — PROGRESS NOTES
Social Work Note: Telephone Call  Oncology Clinic     Data/Intervention:  Patient Name:  Jennifer Cervantes    /Age: 2000 , 22 years old     Call From: [facility]        Reason for Call:  Transportation     Assessment:   called [employer] ([phone]) to arrange ride. [employer] arranged  for patient from home with a will call return ride.  Patient will need to call when ready for return ride home ([phone]).      Plan:  Patient did not answer SW's phone call and a detailed VM regarding ride information was left.  available to assist with any other needs.      CARLOS Chavez,LGSW  Hematology/Oncology Social Worker  Phone:[phone] Pager: [phone]

## 2022-10-18 ENCOUNTER — TELEPHONE (OUTPATIENT)
Dept: ONCOLOGY | Facility: CLINIC | Age: 22
End: 2022-10-18

## 2022-10-18 NOTE — TELEPHONE ENCOUNTER
Spoke with Sisi at the pharmacy and verified RX as well as authorized 5 refills instead of 11 due to insurance.      Arely Krueger, RN  RN Care Coordinator  [phone] clinic main line

## 2022-10-18 NOTE — TELEPHONE ENCOUNTER
"Sisi from [facility] pharmacy calling about clarification for drug prescription:    Ferriprox , take 2 tablets by mouth twice daily.  Currently the prescription is written is for 11 refills but pharmacy/insurance will only accept a prescription with 5 refills.     Also needs a verification of from provider as they cannot take a \"stamped signature\".     Call Back Sisi to clarify [phone]    Routed high priority to care team.       "

## 2022-10-21 ENCOUNTER — NURSE TRIAGE (OUTPATIENT)
Dept: ONCOLOGY | Facility: CLINIC | Age: 22
End: 2022-10-21

## 2022-10-21 ENCOUNTER — INFUSION THERAPY VISIT (OUTPATIENT)
Dept: INFUSION THERAPY | Facility: CLINIC | Age: 22
End: 2022-10-21
Attending: PEDIATRICS
Payer: COMMERCIAL

## 2022-10-21 VITALS
DIASTOLIC BLOOD PRESSURE: 84 MMHG | RESPIRATION RATE: 16 BRPM | HEART RATE: 106 BPM | SYSTOLIC BLOOD PRESSURE: 131 MMHG | OXYGEN SATURATION: 96 % | TEMPERATURE: 98.3 F

## 2022-10-21 DIAGNOSIS — D57.00 SICKLE CELL PAIN CRISIS (H): ICD-10-CM

## 2022-10-21 DIAGNOSIS — G81.10 SPASTIC HEMIPLEGIA, UNSPECIFIED ETIOLOGY, UNSPECIFIED LATERALITY (H): Primary | ICD-10-CM

## 2022-10-21 PROCEDURE — 96374 THER/PROPH/DIAG INJ IV PUSH: CPT

## 2022-10-21 PROCEDURE — 96361 HYDRATE IV INFUSION ADD-ON: CPT

## 2022-10-21 PROCEDURE — 250N000011 HC RX IP 250 OP 636: Performed by: PEDIATRICS

## 2022-10-21 PROCEDURE — 258N000003 HC RX IP 258 OP 636: Performed by: PEDIATRICS

## 2022-10-21 PROCEDURE — 96376 TX/PRO/DX INJ SAME DRUG ADON: CPT

## 2022-10-21 RX ORDER — HEPARIN SODIUM,PORCINE 10 UNIT/ML
5 VIAL (ML) INTRAVENOUS
Status: CANCELLED | OUTPATIENT
Start: 2023-01-01

## 2022-10-21 RX ORDER — MORPHINE SULFATE 2 MG/ML
2 INJECTION, SOLUTION INTRAMUSCULAR; INTRAVENOUS
Status: CANCELLED
Start: 2023-01-01

## 2022-10-21 RX ORDER — ONDANSETRON 8 MG/1
8 TABLET, FILM COATED ORAL
Status: CANCELLED
Start: 2023-01-01

## 2022-10-21 RX ORDER — MORPHINE SULFATE 2 MG/ML
2 INJECTION, SOLUTION INTRAMUSCULAR; INTRAVENOUS
Status: DISCONTINUED | OUTPATIENT
Start: 2022-10-21 | End: 2022-10-21 | Stop reason: HOSPADM

## 2022-10-21 RX ORDER — DIPHENHYDRAMINE HCL 25 MG
25 CAPSULE ORAL
Status: CANCELLED
Start: 2023-01-01

## 2022-10-21 RX ORDER — OXYCODONE HYDROCHLORIDE 15 MG/1
15 TABLET ORAL EVERY 4 HOURS PRN
Qty: 40 TABLET | Refills: 0 | Status: SHIPPED | OUTPATIENT
Start: 2022-10-21 | End: 2022-10-28

## 2022-10-21 RX ORDER — HEPARIN SODIUM (PORCINE) LOCK FLUSH IV SOLN 100 UNIT/ML 100 UNIT/ML
5 SOLUTION INTRAVENOUS
Status: DISCONTINUED | OUTPATIENT
Start: 2022-10-21 | End: 2022-10-21 | Stop reason: HOSPADM

## 2022-10-21 RX ORDER — HEPARIN SODIUM (PORCINE) LOCK FLUSH IV SOLN 100 UNIT/ML 100 UNIT/ML
5 SOLUTION INTRAVENOUS
Status: CANCELLED | OUTPATIENT
Start: 2023-01-01

## 2022-10-21 RX ADMIN — MORPHINE SULFATE 2 MG: 2 INJECTION, SOLUTION INTRAMUSCULAR; INTRAVENOUS at 11:08

## 2022-10-21 RX ADMIN — MORPHINE SULFATE 2 MG: 2 INJECTION, SOLUTION INTRAMUSCULAR; INTRAVENOUS at 09:09

## 2022-10-21 RX ADMIN — MORPHINE SULFATE 2 MG: 2 INJECTION, SOLUTION INTRAMUSCULAR; INTRAVENOUS at 10:08

## 2022-10-21 RX ADMIN — Medication 5 ML: at 11:24

## 2022-10-21 RX ADMIN — DEXTROSE AND SODIUM CHLORIDE 1000 ML: 5; 450 INJECTION, SOLUTION INTRAVENOUS at 09:08

## 2022-10-21 NOTE — LETTER
Date:October 21, 2022      Provider requested that no letter be sent. Do not send.       [facility]

## 2022-10-21 NOTE — TELEPHONE ENCOUNTER
[facility] Triage    Refill Request    Date of most recent appointment:  9/26/22 Perla  Next upcoming appointment:   11/17/22 Jose Rafael    Medication requested: Oxy  Quantity: 40  Last fill date: 10/13/22  Person requesting refill: Jennifer  Notes:  Only has a few left for today.    Prescribing provider(s): Jose Rafael    No negative side effects    Routing to Dr. Duncan

## 2022-10-21 NOTE — LETTER
10/21/2022         RE: Jennifer Cervantes  [address]  [place] [address]        Dear Colleague,    Thank you for referring your patient, Jennifer Cervantes, to the [facility]. Please see a copy of my visit note below.    Nursing Note  Jennifer Cervantes [facility] for:   Chief Complaint   Patient presents with     Infusion     Fluids/ pain meds   During today's [facility] appointment, orders from Dr. Duncan were completed.  Frequency: PRN    Progress note:  Patient identification verified by name and date of birth.  Assessment completed.  Vitals recorded in Doc Flowsheets.  Patient was provided with education regarding medication/procedure and possible side effects.  Patient verbalized understanding.     present during visit today: Not Applicable.    Treatment Conditions: patient met parameters for infusion per triage    Premedications: were not ordered.    Drug Waste Record: No    Infusion length and rate:  infusion given over approximately 2 hours    Labs: were not ordered for this appointment.    Vascular access: port accessed today.    Is the next appt scheduled? NA  Asymptomatic COVID test completed? NA    Post Infusion Assessment:  Patient tolerated infusion without incident. Back pain down from 9/10 upon arrival to 4510 and tolerable at [facility] discharge.    Discharge Plan:   Follow up plan of care with: ongoing infusions at [facility]., ordering provider as scheduled. and AVS to [facility]  Discharge instructions were reviewed with patient.  Patient/representative verbalized understanding of discharge instructions and all questions answered.  Patient discharged from [facility] in stable condition.    Lis Cabrera RN    Administrations This Visit     dextrose 5% and 0.45% NaCl BOLUS     Admin Date  10/21/2022  Action  New Bag Dose  1,000 mL Rate  500 mL/hr Route  Intravenous Administered By  Lis Cabrera RN          heparin 100 UNIT/ML injection 5 mL     Admin Date  10/21/2022 Action  Given Dose  5 mL Route  Intracatheter Administered By  Lis Cabrera RN          morphine (PF) injection 2 mg     Admin Date  10/21/2022 Action  Given Dose  2 mg Route  Intravenous Administered By  Lis Cabrera RN           Admin Date  10/21/2022 Action  Given Dose  2 mg Route  Intravenous Administered By  Lis Cabrera RN           Admin Date  10/21/2022 Action  Given Dose  2 mg Route  Intravenous Administered By  Lis Cabrera RN                /78   Pulse 104   Temp 98.3  F (36.8  C) (Oral)   Resp 16   SpO2 96%           Again, thank you for allowing me to participate in the care of your patient.        Sincerely,        No name on file

## 2022-10-21 NOTE — PROGRESS NOTES
Nursing Note  Jennifer Cervantes presents [facility] for:   Chief Complaint   Patient presents with     Infusion     Fluids/ pain meds   During [facility]'s [facility] appointment, orders from Dr. Duncna were completed.  Frequency: PRN    Progress note:  Patient identification verified by name and date of birth.  Assessment completed.  Vitals recorded in Doc Flowsheets.  Patient was provided with education regarding medication/procedure and possible side effects.  Patient verbalized understanding.     present during visit today: Not Applicable.    Treatment Conditions: patient met parameters for infusion per triage    Premedications: were not ordered.    Drug Waste Record: No    Infusion length and rate:  infusion given over approximately 2 hours    Labs: were not ordered for this appointment.    Vascular access: port accessed today.    Is the next appt scheduled? NA  Asymptomatic COVID test completed? NA    Post Infusion Assessment:  Patient tolerated infusion without incident. Back pain down from 9/10 upon arrival to 4510 and tolerable at [facility] discharge.    Discharge Plan:   Follow up plan of care with: ongoing infusions [facility]., ordering provider as scheduled. and AVS to [facility]  Discharge instructions were reviewed with patient.  Patient/representative verbalized understanding of discharge instructions and all questions answered.  Patient discharged [facility] in stable condition.    Lis Cabrera RN    Administrations This Visit     dextrose 5% and 0.45% NaCl BOLUS     Admin Date  10/21/2022 Action  New Bag Dose  1,000 mL Rate  500 mL/hr Route  Intravenous Administered By  Lis Cabrera RN          heparin 100 UNIT/ML injection 5 mL     Admin Date  10/21/2022 Action  Given Dose  5 mL Route  Intracatheter Administered By  Lis Cabrera RN           morphine (PF) injection 2 mg     Admin Date  10/21/2022 Action  Given Dose  2 mg Route  Intravenous Administered By  Lis Cabrera RN           Admin Date  10/21/2022 Action  Given Dose  2 mg Route  Intravenous Administered By  Lis Cabrera RN           Admin Date  10/21/2022 Action  Given Dose  2 mg Route  Intravenous Administered By  Lis Cabrera RN                /78   Pulse 104   Temp 98.3  F (36.8  C) (Oral)   Resp 16   SpO2 96%

## 2022-10-21 NOTE — TELEPHONE ENCOUNTER
[facility] Triage    Pt called in to triage requesting IVF pain meds for all over pain rated 9/10 x 7 days. Stated last took oxy this morning without relief. Denied any fevers, chills, cough, sob, chest pain or other symptoms.  Assess for confusion, numbness, paralysis, vomiting, headache, vision issues, difficulty walking and/or talking. Pt's last infusion was 10/17/22, last clinic visit 9/26/22 Perla with follow-up on 11/17/22 with Jose Rafael. Patient states they do need a ride and it will take 60 to get to cancer clinic. Pt needs a refill on her oxycodone today. Refill sent to Dr. Duncan. Pt qualifies for sickle cell standing order protocol.    Placed on waiting list  [facility] can take her before 9.  Not after.  753 amSW contacted for ride - but she has to be here before 9, awaiting response  Spoke to Jennifer and let her know if SW can get her here before 9 she can get [facility], but [facility]  [facility] called and they will provide Jennifer with a ride and she will be here before 9.  CCOD notified to make appt   Refill sent to Perla

## 2022-10-24 ENCOUNTER — TELEPHONE (OUTPATIENT)
Dept: ONCOLOGY | Facility: CLINIC | Age: 22
End: 2022-10-24

## 2022-10-24 ENCOUNTER — INFUSION THERAPY VISIT (OUTPATIENT)
Dept: ONCOLOGY | Facility: CLINIC | Age: 22
End: 2022-10-24
Payer: COMMERCIAL

## 2022-10-24 VITALS
SYSTOLIC BLOOD PRESSURE: 136 MMHG | HEART RATE: 103 BPM | OXYGEN SATURATION: 95 % | RESPIRATION RATE: 16 BRPM | TEMPERATURE: 98.2 F | DIASTOLIC BLOOD PRESSURE: 83 MMHG

## 2022-10-24 DIAGNOSIS — D57.00 SICKLE CELL PAIN CRISIS (H): ICD-10-CM

## 2022-10-24 DIAGNOSIS — G81.10 SPASTIC HEMIPLEGIA, UNSPECIFIED ETIOLOGY, UNSPECIFIED LATERALITY (H): Primary | ICD-10-CM

## 2022-10-24 PROCEDURE — 96361 HYDRATE IV INFUSION ADD-ON: CPT

## 2022-10-24 PROCEDURE — 96374 THER/PROPH/DIAG INJ IV PUSH: CPT

## 2022-10-24 PROCEDURE — 258N000003 HC RX IP 258 OP 636: Performed by: PEDIATRICS

## 2022-10-24 PROCEDURE — 96376 TX/PRO/DX INJ SAME DRUG ADON: CPT

## 2022-10-24 PROCEDURE — 250N000011 HC RX IP 250 OP 636: Performed by: PEDIATRICS

## 2022-10-24 RX ORDER — DIPHENHYDRAMINE HCL 25 MG
25 CAPSULE ORAL
Status: CANCELLED
Start: 2023-01-01

## 2022-10-24 RX ORDER — HEPARIN SODIUM (PORCINE) LOCK FLUSH IV SOLN 100 UNIT/ML 100 UNIT/ML
5 SOLUTION INTRAVENOUS
Status: CANCELLED | OUTPATIENT
Start: 2023-01-01

## 2022-10-24 RX ORDER — HEPARIN SODIUM (PORCINE) LOCK FLUSH IV SOLN 100 UNIT/ML 100 UNIT/ML
5 SOLUTION INTRAVENOUS
Status: DISCONTINUED | OUTPATIENT
Start: 2022-10-24 | End: 2022-10-24 | Stop reason: HOSPADM

## 2022-10-24 RX ORDER — ONDANSETRON 8 MG/1
8 TABLET, ORALLY DISINTEGRATING ORAL
Status: DISCONTINUED | OUTPATIENT
Start: 2022-10-24 | End: 2022-10-24 | Stop reason: HOSPADM

## 2022-10-24 RX ORDER — HEPARIN SODIUM,PORCINE 10 UNIT/ML
5 VIAL (ML) INTRAVENOUS
Status: CANCELLED | OUTPATIENT
Start: 2023-01-01

## 2022-10-24 RX ORDER — MORPHINE SULFATE 2 MG/ML
2 INJECTION, SOLUTION INTRAMUSCULAR; INTRAVENOUS
Status: DISCONTINUED | OUTPATIENT
Start: 2022-10-24 | End: 2022-10-24 | Stop reason: HOSPADM

## 2022-10-24 RX ORDER — ONDANSETRON 8 MG/1
8 TABLET, FILM COATED ORAL
Status: CANCELLED
Start: 2023-01-01

## 2022-10-24 RX ORDER — DIPHENHYDRAMINE HCL 25 MG
25 CAPSULE ORAL
Status: DISCONTINUED | OUTPATIENT
Start: 2022-10-24 | End: 2022-10-24 | Stop reason: HOSPADM

## 2022-10-24 RX ORDER — MORPHINE SULFATE 2 MG/ML
2 INJECTION, SOLUTION INTRAMUSCULAR; INTRAVENOUS
Status: CANCELLED
Start: 2023-01-01

## 2022-10-24 RX ADMIN — MORPHINE SULFATE 2 MG: 2 INJECTION, SOLUTION INTRAMUSCULAR; INTRAVENOUS at 09:58

## 2022-10-24 RX ADMIN — MORPHINE SULFATE 2 MG: 2 INJECTION, SOLUTION INTRAMUSCULAR; INTRAVENOUS at 10:58

## 2022-10-24 RX ADMIN — MORPHINE SULFATE 2 MG: 2 INJECTION, SOLUTION INTRAMUSCULAR; INTRAVENOUS at 08:55

## 2022-10-24 RX ADMIN — DEXTROSE AND SODIUM CHLORIDE 1000 ML: 5; 450 INJECTION, SOLUTION INTRAVENOUS at 08:53

## 2022-10-24 RX ADMIN — Medication 5 ML: at 11:12

## 2022-10-24 NOTE — TELEPHONE ENCOUNTER
Pt called in to triage requesting IVF pain meds for generalized pain rated 8/10 x 3 days. Stated last took prn oxycodone at 6am this morning without relief. Denied any fevers, chills, cough, sob, chest pain, numbness or tingling or other symptoms not typical of sickle cell pain.   Pt's last infusion was 10/21/22, last clinic visit 9/26/22 with follow-up on 11/17/22 with Patricia Mantilla .   Patient states they do not have own ride and it will take 60 minutes long to get to cancer clinic.   Pt denied being out of home medications and needing any refills today.   Pt qualifies for sickle cell standing order protocol.  If you do not hear from the infusion center by 2pm then you will not be able to get in for an infusion today.   Please note, if you are late for your appt, you risk losing your infusion appt as it may delay another patient's infusion who arrived on time.     Ange had an 8:30am cancel if she is able to get to the appointment   Call placed to Jennifer she is checking to see if she can get a ride.     Call received from Jennifer that she can make the 8:30am appointment and does not need a ride home states she has that covered also.     Message sent to scheduling to set up appointment

## 2022-10-24 NOTE — PATIENT INSTRUCTIONS
Contact Numbers  [facility]: [phone] (for symptom and scheduling needs)    Please call the [facility] Triage line if you experience a temperature greater than or equal to 100.4, shaking chills, have uncontrolled nausea, vomiting and/or diarrhea, dizziness, shortness of breath, chest pain, bleeding, unexplained bruising, or if you have any other new/concerning symptoms, questions or concerns.     If you are having any concerning symptoms or wish to speak to a provider before your next infusion visit, please call your care coordinator or triage to notify them so we can adequately serve you.     If you need a refill on a narcotic prescription or other medication, please call triage before your infusion appointment.

## 2022-10-24 NOTE — PROGRESS NOTES
Infusion Nursing Note:  Jennifer Cervantes presents today for IV Fluids and Pain Medication.    Patient seen by provider today: No   present during visit today: Not Applicable.    Note: Patient presents to infusion today reporting 9/10 generalized pain. States this is her normal sickle cell pain. Denies any fevers, chills, shortness of breath, chest pains or cough. Offers no new changes or concerns today.     1L IVF given and 3 doses of IV morphine per therapy plan orders. Patient rating her pain 4/10 at time of discharge. States she feels comfortable discharging home at this time.    Intravenous Access:  Implanted Port.    Treatment Conditions:  Not Applicable.    Post Infusion Assessment:  Patient tolerated infusion without incident.  Blood return noted pre and post infusion.  Site patent and intact, free from redness, edema or discomfort.  No evidence of extravasations.  Access discontinued per protocol.     Discharge Plan:   Prescription refills given for Hydroxyurea and Aspirin.  Discharge instructions reviewed with: Patient.  Patient and/or family verbalized understanding of discharge instructions and all questions answered.  Copy of AVS reviewed with patient and/or family.  Patient will return 11/17 for next appointment.  Patient discharged in stable condition accompanied by: self.  Departure Mode: Ambulatory.      Maritza Bautista RN

## 2022-10-25 ENCOUNTER — PATIENT OUTREACH (OUTPATIENT)
Dept: CARE COORDINATION | Facility: CLINIC | Age: 22
End: 2022-10-25

## 2022-10-25 NOTE — PROGRESS NOTES
Social Work Intervention  [facility]    Data/Intervention:    Patient Name:  Jennifer Cervantes  /Age:  2000 (22 year old)    Visit Type: telephone  Referral Source: [facility]  Reason for Referral:  Transportation for upcoming appointment    Collaborated With:    -Patient    Psychosocial Information/Concerns:  Patient has multiple appointments on 22 at the [facility] and the [facility]. SW asked to assist in arranging transportation.    Intervention/Education/Resources Provided:  SW called [employer] to arrange transportation. Patient will be dropped off to [address] for their morning appointment and then will be picked up from the [facility] after their final appointment. Patient will utilize patient shuttle to get from the hospital to clinic.    Assessment/Plan:  SW called patient and informed them of their ride details and they verbalized understanding. SW will continue to remain available as needed.  Provided patient/family with contact information and availability.    CARLOS Chavez,NATALIA  Hematology/Oncology Social Worker  Phone:[phone] Pager: [phone]

## 2022-10-26 NOTE — PROGRESS NOTES
This is a recent snapshot of the patient's [facility] medical record.  For current drug dose and complete information and questions, call [phone] or In Basket pool, fv home infusion ([id number])  CSN Number:  [id number]

## 2022-10-28 ENCOUNTER — PATIENT OUTREACH (OUTPATIENT)
Dept: CARE COORDINATION | Facility: CLINIC | Age: 22
End: 2022-10-28

## 2022-10-28 ENCOUNTER — INFUSION THERAPY VISIT (OUTPATIENT)
Dept: TRANSPLANT | Facility: CLINIC | Age: 22
End: 2022-10-28
Attending: PEDIATRICS
Payer: COMMERCIAL

## 2022-10-28 ENCOUNTER — TELEPHONE (OUTPATIENT)
Dept: ONCOLOGY | Facility: CLINIC | Age: 22
End: 2022-10-28

## 2022-10-28 VITALS
HEART RATE: 89 BPM | TEMPERATURE: 98.3 F | OXYGEN SATURATION: 98 % | DIASTOLIC BLOOD PRESSURE: 63 MMHG | RESPIRATION RATE: 16 BRPM | SYSTOLIC BLOOD PRESSURE: 125 MMHG

## 2022-10-28 DIAGNOSIS — D57.00 SICKLE CELL PAIN CRISIS (H): ICD-10-CM

## 2022-10-28 DIAGNOSIS — G81.10 SPASTIC HEMIPLEGIA, UNSPECIFIED ETIOLOGY, UNSPECIFIED LATERALITY (H): Primary | ICD-10-CM

## 2022-10-28 PROCEDURE — 96361 HYDRATE IV INFUSION ADD-ON: CPT

## 2022-10-28 PROCEDURE — 96374 THER/PROPH/DIAG INJ IV PUSH: CPT

## 2022-10-28 PROCEDURE — 250N000011 HC RX IP 250 OP 636: Performed by: PEDIATRICS

## 2022-10-28 PROCEDURE — 258N000003 HC RX IP 258 OP 636: Performed by: PEDIATRICS

## 2022-10-28 PROCEDURE — 96376 TX/PRO/DX INJ SAME DRUG ADON: CPT

## 2022-10-28 RX ORDER — HEPARIN SODIUM,PORCINE 10 UNIT/ML
5 VIAL (ML) INTRAVENOUS
Status: CANCELLED | OUTPATIENT
Start: 2023-01-01

## 2022-10-28 RX ORDER — DIPHENHYDRAMINE HCL 25 MG
25 CAPSULE ORAL
Status: CANCELLED
Start: 2023-01-01

## 2022-10-28 RX ORDER — ONDANSETRON 8 MG/1
8 TABLET, FILM COATED ORAL
Status: CANCELLED
Start: 2023-01-01

## 2022-10-28 RX ORDER — MORPHINE SULFATE 2 MG/ML
2 INJECTION, SOLUTION INTRAMUSCULAR; INTRAVENOUS
Status: DISCONTINUED | OUTPATIENT
Start: 2022-10-28 | End: 2022-10-28 | Stop reason: HOSPADM

## 2022-10-28 RX ORDER — MORPHINE SULFATE 2 MG/ML
2 INJECTION, SOLUTION INTRAMUSCULAR; INTRAVENOUS
Status: CANCELLED
Start: 2023-01-01

## 2022-10-28 RX ORDER — HEPARIN SODIUM (PORCINE) LOCK FLUSH IV SOLN 100 UNIT/ML 100 UNIT/ML
5 SOLUTION INTRAVENOUS
Status: DISCONTINUED | OUTPATIENT
Start: 2022-10-28 | End: 2022-10-28 | Stop reason: HOSPADM

## 2022-10-28 RX ORDER — OXYCODONE HYDROCHLORIDE 15 MG/1
15 TABLET ORAL EVERY 4 HOURS PRN
Qty: 40 TABLET | Refills: 0 | Status: SHIPPED | OUTPATIENT
Start: 2022-10-28 | End: 2022-11-04

## 2022-10-28 RX ORDER — HEPARIN SODIUM (PORCINE) LOCK FLUSH IV SOLN 100 UNIT/ML 100 UNIT/ML
5 SOLUTION INTRAVENOUS
Status: CANCELLED | OUTPATIENT
Start: 2023-01-01

## 2022-10-28 RX ADMIN — Medication 5 ML: at 11:48

## 2022-10-28 RX ADMIN — DEXTROSE AND SODIUM CHLORIDE 1000 ML: 5; 450 INJECTION, SOLUTION INTRAVENOUS at 09:37

## 2022-10-28 RX ADMIN — MORPHINE SULFATE 2 MG: 2 INJECTION, SOLUTION INTRAMUSCULAR; INTRAVENOUS at 11:38

## 2022-10-28 RX ADMIN — MORPHINE SULFATE 2 MG: 2 INJECTION, SOLUTION INTRAMUSCULAR; INTRAVENOUS at 10:39

## 2022-10-28 RX ADMIN — MORPHINE SULFATE 2 MG: 2 INJECTION, SOLUTION INTRAMUSCULAR; INTRAVENOUS at 09:38

## 2022-10-28 ASSESSMENT — PAIN SCALES - GENERAL: PAINLEVEL: EXTREME PAIN (8)

## 2022-10-28 NOTE — TELEPHONE ENCOUNTER
Pt called in to triage requesting IVF pain meds for back  pain rated 8/10 x 2 days. Stated last took prn oxycodone at 6am  this morning without relief. Denied any fevers, chills, cough, sob, chest pain, numbness or tingling or other symptoms not typical of sickle cell pain.   Pt's last infusion was 10/24/22, last clinic visit 9/26/22 with follow-up on 11/17/22 with Patricia Mantilla .   Patient states they do not have own ride and it will take  60 long to get to cancer clinic.   Needed refill of Oxycodone today .   Pt qualifies for sickle cell standing order protocol.  If you do not hear from the infusion center by 2pm then you will not be able to get in for an infusion today.   Please note, if you are late for your appt, you risk losing your infusion appt as it may delay another patient's infusion who arrived on time.     BMT can take at 9:30 for IVF/pain meds     Call placed to Jennifer and she can make that appointment.     Message sent to [facility] and [facility] to assist with appointment.

## 2022-10-28 NOTE — LETTER
Date:October 29, 2022      Provider requested that no letter be sent. Do not send.       [facility]

## 2022-10-28 NOTE — NURSING NOTE
"Oncology Rooming Note    October 28, 2022 10:03 AM   Jennifer Cervantes is a 22 year old female who presents for:    Chief Complaint   Patient presents with     Infusion     Add on infusion for IV fluids and pain medications.     Initial Vitals: /63 (BP Location: Right arm, Patient Position: Sitting, Cuff Size: Adult Regular)   Pulse 89   Temp 98.3  F (36.8  C) (Tympanic)   Resp 16   SpO2 98%  Estimated body mass index is 28.37 kg/m  as calculated from the following:    Height as of 9/28/22: 1.626 m (5' 4\").    Weight as of 10/12/22: 75 kg (165 lb 4.8 oz). There is no height or weight on file to calculate BSA.  Extreme Pain (8) Comment: Data Unavailable   No LMP recorded. Patient has had an injection.  Allergies reviewed: Yes  Medications reviewed: Yes    Medications: MEDICATION REFILLS NEEDED TODAY. Provider was notified.  Pharmacy name entered into [email or web address]: [facility] [place], [place] -  [facility] [id number]    Clinical concerns: none       Jamaica Napoles RN              "

## 2022-10-28 NOTE — PROGRESS NOTES
Infusion Nursing Note:  Jennifer Cervantes presents today for IV fluids and pain medications.    Patient seen by provider today: No   present during visit today: Not Applicable.    Note:   Pt received a liter of D5/45NS over 2 hours.    Pt received 2 mg morphine IV every hour for three doses (total of 6 mg morphine)    Intravenous Access:  Implanted port.    Treatment Conditions:  Not Applicable.    Post Infusion Assessment:  Patient tolerated infusion without incident.  Blood return pre and post infusion.  Site patent and intact, free from redness, edema or discomfort.  No evidence of extravasations.  Access discontinued per protocol.     Discharge Plan:   Discharge instructions reviewed with: Patient.  Patient discharged in stable condition accompanied by: self.  Departure Mode: Ambulatory.      Jamaica Napoles RN

## 2022-10-28 NOTE — TELEPHONE ENCOUNTER
Narcotic Refill Request    Medication(s) requested:  Oxycodone   Person Requesting Refill: adelaide  What pain is the medication treating: Sickle Cell pain crisis  How is the medication being taken?:Takes 15 mg every 4 hours   Does pt have enough for today? Needs refill today will be out by tomorrow   Is pain being adequately controlled on the current regimen?: yes   Experiencing any side effects from medication?: No     Date of most recent appointment:  9/26/22 DR Duncan   Any No Show Visits:no   Next appointment:   11/17/22 Patricia Mantilla   Last fill date and by whom:  10/21/22 Dr Duncan    Reviewed: no access     Routed  provider: Dr Duncan

## 2022-10-28 NOTE — PROGRESS NOTES
Social Work Note: Telephone Call  Oncology Clinic     Data/Intervention:  Patient Name: Jennifer Cervantes  /Age: 2000, 22 years old     Call From: [facility]        Reason for Call:  Transportation     Assessment:   called [employer] ([phone]) to arrange ride. [employer] arranged  for patient from home with a will call ride.  Patient will need to call when ready for return ride home ([phone]).      Plan:  Patient is aware of the transportation plan.  available to assist with any other needs.      CARLOS Chavez,[facility]  Hematology/Oncology Social Worker  Phone:[phone] Pager: [phone]

## 2022-10-28 NOTE — LETTER
10/28/2022         RE: Jennifer Cervantes  [address]  [place] [address]        Dear Colleague,    Thank you for referring your patient, Jennifer Cervantes, to the [facility] [place]. Please see a copy of my visit note below.    Infusion Nursing Note:  Jennifer Cervantes presents today for IV fluids and pain medications.    Patient seen by provider today: No   present during visit today: Not Applicable.    Note:   Pt received a liter of D5/45NS over 2 hours.    Pt received 2 mg morphine IV every hour for three doses (total of 6 mg morphine)    Intravenous Access:  Implanted port.    Treatment Conditions:  Not Applicable.    Post Infusion Assessment:  Patient tolerated infusion without incident.  Blood return pre and post infusion.  Site patent and intact, free from redness, edema or discomfort.  No evidence of extravasations.  Access discontinued per protocol.     Discharge Plan:   Discharge instructions reviewed with: Patient.  Patient discharged in stable condition accompanied by: self.  Departure Mode: Ambulatory.      Jamaica Napoles RN                          Again, thank you for allowing me to participate in the care of your patient.        Sincerely,        No name on file

## 2022-10-31 ENCOUNTER — INFUSION THERAPY VISIT (OUTPATIENT)
Dept: INFUSION THERAPY | Facility: CLINIC | Age: 22
End: 2022-10-31
Attending: PEDIATRICS
Payer: COMMERCIAL

## 2022-10-31 ENCOUNTER — TELEPHONE (OUTPATIENT)
Dept: ONCOLOGY | Facility: CLINIC | Age: 22
End: 2022-10-31

## 2022-10-31 ENCOUNTER — PATIENT OUTREACH (OUTPATIENT)
Dept: CARE COORDINATION | Facility: CLINIC | Age: 22
End: 2022-10-31

## 2022-10-31 VITALS
TEMPERATURE: 98 F | HEART RATE: 93 BPM | SYSTOLIC BLOOD PRESSURE: 128 MMHG | DIASTOLIC BLOOD PRESSURE: 86 MMHG | OXYGEN SATURATION: 93 % | RESPIRATION RATE: 16 BRPM

## 2022-10-31 DIAGNOSIS — D57.00 SICKLE CELL PAIN CRISIS (H): ICD-10-CM

## 2022-10-31 DIAGNOSIS — G81.10 SPASTIC HEMIPLEGIA, UNSPECIFIED ETIOLOGY, UNSPECIFIED LATERALITY (H): Primary | ICD-10-CM

## 2022-10-31 PROCEDURE — 96361 HYDRATE IV INFUSION ADD-ON: CPT

## 2022-10-31 PROCEDURE — 96376 TX/PRO/DX INJ SAME DRUG ADON: CPT

## 2022-10-31 PROCEDURE — 250N000011 HC RX IP 250 OP 636: Performed by: PEDIATRICS

## 2022-10-31 PROCEDURE — 258N000003 HC RX IP 258 OP 636: Performed by: PEDIATRICS

## 2022-10-31 PROCEDURE — 96374 THER/PROPH/DIAG INJ IV PUSH: CPT

## 2022-10-31 RX ORDER — DIPHENHYDRAMINE HCL 25 MG
25 CAPSULE ORAL
Status: CANCELLED
Start: 2023-01-01

## 2022-10-31 RX ORDER — HEPARIN SODIUM (PORCINE) LOCK FLUSH IV SOLN 100 UNIT/ML 100 UNIT/ML
5 SOLUTION INTRAVENOUS
Status: DISCONTINUED | OUTPATIENT
Start: 2022-10-31 | End: 2022-10-31 | Stop reason: HOSPADM

## 2022-10-31 RX ORDER — HEPARIN SODIUM (PORCINE) LOCK FLUSH IV SOLN 100 UNIT/ML 100 UNIT/ML
5 SOLUTION INTRAVENOUS
Status: CANCELLED | OUTPATIENT
Start: 2023-01-01

## 2022-10-31 RX ORDER — MORPHINE SULFATE 2 MG/ML
2 INJECTION, SOLUTION INTRAMUSCULAR; INTRAVENOUS
Status: DISCONTINUED | OUTPATIENT
Start: 2022-10-31 | End: 2022-10-31 | Stop reason: HOSPADM

## 2022-10-31 RX ORDER — ONDANSETRON 8 MG/1
8 TABLET, FILM COATED ORAL
Status: CANCELLED
Start: 2023-01-01

## 2022-10-31 RX ORDER — MORPHINE SULFATE 2 MG/ML
2 INJECTION, SOLUTION INTRAMUSCULAR; INTRAVENOUS
Status: CANCELLED
Start: 2023-01-01

## 2022-10-31 RX ORDER — HEPARIN SODIUM,PORCINE 10 UNIT/ML
5 VIAL (ML) INTRAVENOUS
Status: CANCELLED | OUTPATIENT
Start: 2023-01-01

## 2022-10-31 RX ADMIN — DEXTROSE AND SODIUM CHLORIDE 1000 ML: 5; 450 INJECTION, SOLUTION INTRAVENOUS at 14:21

## 2022-10-31 RX ADMIN — MORPHINE SULFATE 2 MG: 2 INJECTION, SOLUTION INTRAMUSCULAR; INTRAVENOUS at 14:21

## 2022-10-31 RX ADMIN — MORPHINE SULFATE 2 MG: 2 INJECTION, SOLUTION INTRAMUSCULAR; INTRAVENOUS at 15:17

## 2022-10-31 RX ADMIN — Medication 5 ML: at 16:30

## 2022-10-31 RX ADMIN — MORPHINE SULFATE 2 MG: 2 INJECTION, SOLUTION INTRAMUSCULAR; INTRAVENOUS at 16:15

## 2022-10-31 NOTE — TELEPHONE ENCOUNTER
Oncology Nurse Triage - Sickle Cell Pain Crisis:    Situation: Jennifer (pt)calling about Sickle Cell Pain Crisis    Background:     Patient's last infusion was 10/28/22  Last clinic visit date:10/12/22 Dr. Duncan  Next follow-up appt on 11/17/22 with provider Patricia Mantilla CNP  Does patient have active treatment plan?  Yes    (If pt has been seen in ED or infusion in last 3 days or no showed last clinic visit then does not meet protocol unless specified in pt therapy plan)    Assessment of Symptoms:  Onset/Duration of symptoms: 2  day    Is it typical sickle cell pain? Yes   Location: generalized body  Character: Sharp           Intensity: 9/10    Any radiation of pain, numbness, tingling, weakness, warmth, swelling, discoloration of extremities?No     Fever?No  (if yes max temperature recorded in last 24 hours):      Chest Pain Present: No     Shortness of breath: No     Other home therapies tried: HEAT/HEATING PAD     Last home medication taken and when:  At 6am took all scheduled meds for the morning and oxycodone.    Any Refills Needed?: No     Does patient have transportation & length of time to get to clinic: No   Need transportation.     Grades for reference:       Grade 1 -   o Patient has active treatment plan.   o Patients last scheduled clinic appointment was attended  o Patient has not been seen in infusion or ED in the last 3 days (clarify exclusions in therapy plans)   o Afebrile   o Typically sickle cell pain   o Home medications have been tried   o No other symptoms       Grade 2 -   o Temperature of over 100.0   o Different sickle cell pain   o Decreased PO intake   o Arm or left swelling   o ED or infusion visit within the last 3 days.   o Out of home medications      Grade 3 -   o New chest pain   o New shortness of breath  o Change in mental status     Recommendations:   Meet protocol     1008 This writer spoke to Jennifer, for 2:00pm IVF/Pain in [facility]. Jennifer in agreement with appt time.       1011:   paged to assist with transportation    1012 Scheduling notified  to add appts.     Please note, if you are late for your appt, you risk losing your infusion appt as it may delay another patient's infusion who arrived on time.

## 2022-10-31 NOTE — PROGRESS NOTES
Infusion Nursing Note:  Jennifer Cervantes presents today for   Chief Complaint   Patient presents with     Infusion     IV fluids - dextrose 5% and 0.45% NaCl BOLUS; analgesia       Patient seen by provider today: No   present during visit today: Not Applicable.    Note:   -Orders from Eric Duncan MD completed. Frequency: as needed, not more than 3 consecutive days; last infusion 10/28/22.  -1000ml D5-1/2NS infused over 2hrs.  -Morphine given hourly x3. Pain level on arrival 8/10, generalized. Pain level at discharge 5/10.    Intravenous Access:  Implanted Port.    Treatment Conditions:  Not Applicable.    Post Infusion Assessment:  Patient tolerated infusion without incident.  Blood return noted pre and post infusion.  Site patent and intact, free from redness, edema or discomfort.  No evidence of extravasations.  Access discontinued per protocol.     Discharge Plan:   Discharge instructions reviewed with: Patient.  Patient and/or family verbalized understanding of discharge instructions and all questions answered.  AVS to patient via MYCHART.  Patient will return as needed for next appointment.   Patient discharged in stable condition accompanied by: self.  Departure Mode: Ambulatory.      Roz Kaur RN    /72 (BP Location: Left arm, Patient Position: Fowlers, Cuff Size: Adult Regular)   Pulse 100   Temp 98  F (36.7  C)   Resp 16   SpO2 93%     Administrations This Visit     dextrose 5% and 0.45% NaCl BOLUS     Admin Date  10/31/2022 Action  New Bag Dose  1,000 mL Rate  500 mL/hr Route  Intravenous Administered By  Roz Kaur, JOE          heparin 100 UNIT/ML injection 5 mL     Admin Date  10/31/2022 Action  Given Dose  5 mL Route  Intracatheter Administered By  Tati Iglesias RN          morphine (PF) injection 2 mg     Admin Date  10/31/2022 Action  Given Dose  2 mg Route  Intravenous Administered By  Roz Kaur RN           Admin Date  10/31/2022 Action  Given Dose  2 mg  Route  Intravenous Administered By  Roz Kaur, JOE           Admin Date  10/31/2022 Action  Given Dose  2 mg Route  Intravenous Administered By  Roz Kaur, RN

## 2022-10-31 NOTE — PATIENT INSTRUCTIONS
Dear Jennifer Cervantes    Thank you for choosing [facility] ([facility]) for your infusion.  The following information is a summary of our appointment as well as important reminders.      We look forward to seeing you at your next appointment here at [facility] ([facility]).  Please don t hesitate to call us at [phone] to reschedule any of your appointments or to speak with one of the [facility] registered nurses.  It was a pleasure taking care of you today.    Sincerely,    [facility]  [facility]  [address] [place] [address]  [address]  Phone:  [phone]

## 2022-10-31 NOTE — PROGRESS NOTES
Social Work Note: Telephone Call  Oncology Clinic     Data/Intervention:  Patient Name:  Jennifer Cervantes  /Age: 2000, 22 years old     Call From: [facility]        Reason for Call:  Transportation     Assessment:   called [employer] ([phone]) to arrange ride. [employer] arranged  for patient from home with a will call return ride.  Patient will need to call when ready for return ride home ([phone]).      Plan:  Patient is aware of the transportation plan.  available to assist with any other needs.      CARLOS Chavez,[facility]  Hematology/Oncology Social Worker  Phone:[phone] Pager: [phone]

## 2022-10-31 NOTE — LETTER
10/31/2022         RE: Jennifer Cervantes  [address]  [place] [address]        Dear Colleague,    Thank you for referring your patient, Jennifer Cervantes, to the [facility]. Please see a copy of my visit note below.    Infusion Nursing Note:  Jennifer Cervantes presents today for   Chief Complaint   Patient presents with     Infusion     IV fluids - dextrose 5% and 0.45% NaCl BOLUS; analgesia       Patient seen by provider today: No   present during visit today: Not Applicable.    Note:   -Orders from Eric Duncan MD completed. Frequency: as needed, not more than 3 consecutive days; last infusion 10/28/22.  -1000ml D5-1/2NS infused over 2hrs.  -Morphine given hourly x3. Pain level on arrival 8/10, generalized. Pain level at discharge 5/10.    Intravenous Access:  Implanted Port.    Treatment Conditions:  Not Applicable.    Post Infusion Assessment:  Patient tolerated infusion without incident.  Blood return noted pre and post infusion.  Site patent and intact, free from redness, edema or discomfort.  No evidence of extravasations.  Access discontinued per protocol.     Discharge Plan:   Discharge instructions reviewed with: Patient.  Patient and/or family verbalized understanding of discharge instructions and all questions answered.  AVS to patient via MYCHART.  Patient will return as needed for next appointment.   Patient discharged in stable condition accompanied by: self.  Departure Mode: Ambulatory.      Roz Kaur RN    /72 (BP Location: Left arm, Patient Position: Fowlers, Cuff Size: Adult Regular)   Pulse 100   Temp 98  F (36.7  C)   Resp 16   SpO2 93%     Administrations This Visit     dextrose 5% and 0.45% NaCl BOLUS     Admin Date  10/31/2022 Action  New Bag Dose  1,000 mL Rate  500 mL/hr Route  Intravenous Administered By  Roz Kaur RN          heparin 100 UNIT/ML injection 5 mL     Admin Date  10/31/2022 Action  Given  Dose  5 mL Route  Intracatheter Administered By  Tati Iglesias, JOE          morphine (PF) injection 2 mg     Admin Date  10/31/2022 Action  Given Dose  2 mg Route  Intravenous Administered By  Roz Kaur RN           Admin Date  10/31/2022 Action  Given Dose  2 mg Route  Intravenous Administered By  Roz Kaur RN           Admin Date  10/31/2022 Action  Given Dose  2 mg Route  Intravenous Administered By  Roz Kaur RN                                Again, thank you for allowing me to participate in the care of your patient.        Sincerely,        No name on file

## 2022-11-03 ENCOUNTER — HOSPITAL ENCOUNTER (EMERGENCY)
Facility: CLINIC | Age: 22
Discharge: HOME | End: 2022-11-03
Attending: EMERGENCY MEDICINE | Admitting: EMERGENCY MEDICINE
Payer: COMMERCIAL

## 2022-11-03 VITALS
TEMPERATURE: 97.5 F | SYSTOLIC BLOOD PRESSURE: 129 MMHG | OXYGEN SATURATION: 95 % | DIASTOLIC BLOOD PRESSURE: 78 MMHG | HEART RATE: 108 BPM | BODY MASS INDEX: 29.02 KG/M2 | WEIGHT: 170 LBS | HEIGHT: 64 IN | RESPIRATION RATE: 18 BRPM

## 2022-11-03 DIAGNOSIS — D57.00 SICKLE CELL PAIN CRISIS (H): ICD-10-CM

## 2022-11-03 LAB
BASOPHILS # BLD AUTO: 0.3 10E3/UL (ref 0–0.2)
BASOPHILS NFR BLD AUTO: 1 %
EOSINOPHIL # BLD AUTO: 0.8 10E3/UL (ref 0–0.7)
EOSINOPHIL NFR BLD AUTO: 4 %
ERYTHROCYTE [DISTWIDTH] IN BLOOD BY AUTOMATED COUNT: 21.5 % (ref 10–15)
HCT VFR BLD AUTO: 22.7 % (ref 35–47)
HGB BLD-MCNC: 7.8 G/DL (ref 11.7–15.7)
HOLD SPECIMEN: NORMAL
IMM GRANULOCYTES # BLD: 0.1 10E3/UL
IMM GRANULOCYTES NFR BLD: 1 %
LYMPHOCYTES # BLD AUTO: 2.6 10E3/UL (ref 0.8–5.3)
LYMPHOCYTES NFR BLD AUTO: 11 %
MCH RBC QN AUTO: 29.3 PG (ref 26.5–33)
MCHC RBC AUTO-ENTMCNC: 34.4 G/DL (ref 31.5–36.5)
MCV RBC AUTO: 85 FL (ref 78–100)
MONOCYTES # BLD AUTO: 1.2 10E3/UL (ref 0–1.3)
MONOCYTES NFR BLD AUTO: 5 %
NEUTROPHILS # BLD AUTO: 18.4 10E3/UL (ref 1.6–8.3)
NEUTROPHILS NFR BLD AUTO: 78 %
NRBC # BLD AUTO: 0.2 10E3/UL
NRBC BLD AUTO-RTO: 1 /100
PLATELET # BLD AUTO: 492 10E3/UL (ref 150–450)
RBC # BLD AUTO: 2.66 10E6/UL (ref 3.8–5.2)
RETICS # AUTO: 0.46 10E6/UL (ref 0.03–0.1)
RETICS/RBC NFR AUTO: 17.5 % (ref 0.5–2)
WBC # BLD AUTO: 23.4 10E3/UL (ref 4–11)

## 2022-11-03 PROCEDURE — 96361 HYDRATE IV INFUSION ADD-ON: CPT

## 2022-11-03 PROCEDURE — 96374 THER/PROPH/DIAG INJ IV PUSH: CPT

## 2022-11-03 PROCEDURE — 99285 EMERGENCY DEPT VISIT HI MDM: CPT | Performed by: EMERGENCY MEDICINE

## 2022-11-03 PROCEDURE — 85045 AUTOMATED RETICULOCYTE COUNT: CPT | Performed by: EMERGENCY MEDICINE

## 2022-11-03 PROCEDURE — 85025 COMPLETE CBC W/AUTO DIFF WBC: CPT | Performed by: EMERGENCY MEDICINE

## 2022-11-03 PROCEDURE — 250N000011 HC RX IP 250 OP 636: Performed by: EMERGENCY MEDICINE

## 2022-11-03 PROCEDURE — 99285 EMERGENCY DEPT VISIT HI MDM: CPT | Mod: 25

## 2022-11-03 PROCEDURE — 96376 TX/PRO/DX INJ SAME DRUG ADON: CPT

## 2022-11-03 PROCEDURE — 250N000009 HC RX 250: Performed by: EMERGENCY MEDICINE

## 2022-11-03 PROCEDURE — 96375 TX/PRO/DX INJ NEW DRUG ADDON: CPT

## 2022-11-03 PROCEDURE — 36415 COLL VENOUS BLD VENIPUNCTURE: CPT | Performed by: EMERGENCY MEDICINE

## 2022-11-03 PROCEDURE — 258N000003 HC RX IP 258 OP 636: Performed by: EMERGENCY MEDICINE

## 2022-11-03 RX ORDER — KETOROLAC TROMETHAMINE 15 MG/ML
15 INJECTION, SOLUTION INTRAMUSCULAR; INTRAVENOUS ONCE
Status: COMPLETED | OUTPATIENT
Start: 2022-11-03 | End: 2022-11-03

## 2022-11-03 RX ORDER — ONDANSETRON 2 MG/ML
8 INJECTION INTRAMUSCULAR; INTRAVENOUS ONCE
Status: COMPLETED | OUTPATIENT
Start: 2022-11-03 | End: 2022-11-03

## 2022-11-03 RX ORDER — SODIUM CHLORIDE, SODIUM LACTATE, POTASSIUM CHLORIDE, CALCIUM CHLORIDE 600; 310; 30; 20 MG/100ML; MG/100ML; MG/100ML; MG/100ML
INJECTION, SOLUTION INTRAVENOUS CONTINUOUS
Status: DISCONTINUED | OUTPATIENT
Start: 2022-11-03 | End: 2022-11-03

## 2022-11-03 RX ORDER — SODIUM CHLORIDE 9 MG/ML
INJECTION, SOLUTION INTRAVENOUS CONTINUOUS
Status: DISCONTINUED | OUTPATIENT
Start: 2022-11-03 | End: 2022-11-03 | Stop reason: HOSPADM

## 2022-11-03 RX ADMIN — Medication 4 MG: at 03:38

## 2022-11-03 RX ADMIN — SODIUM CHLORIDE: 900 INJECTION, SOLUTION INTRAVENOUS at 03:10

## 2022-11-03 RX ADMIN — ONDANSETRON 8 MG: 2 INJECTION INTRAMUSCULAR; INTRAVENOUS at 03:39

## 2022-11-03 RX ADMIN — Medication 4 MG: at 04:54

## 2022-11-03 RX ADMIN — KETOROLAC TROMETHAMINE 15 MG: 15 INJECTION, SOLUTION INTRAMUSCULAR; INTRAVENOUS at 03:39

## 2022-11-03 ASSESSMENT — ACTIVITIES OF DAILY LIVING (ADL)
ADLS_ACUITY_SCORE: 33
ADLS_ACUITY_SCORE: 35

## 2022-11-03 NOTE — DISCHARGE INSTRUCTIONS
Thank you for coming to the [facility].     Please follow up with Hematology if current crisis is not improving with your home treatment regimen.

## 2022-11-03 NOTE — ED TRIAGE NOTES
Patient states she has been having a lot of pain and home pain meds are not helping. Tried to wait for infusion center to open but got too bad so she had to come here. Generalized pain  VSS

## 2022-11-03 NOTE — ED PROVIDER NOTES
ED Provider Note  [facility]      History     Chief Complaint   Patient presents with     Sickle Cell Pain Crisis     HPI  Jennifer Cervantes is a 22 year old female who presents for pain.   She reports pain in the extremities and throughout the trunk.  She has tried her usual home remedies without improvement.  She attempted to try to wait through the night to get to infusion center this morning but felt the pain was too severe so came in tonight.  No new traumas.  No sore throat cough fever URI symptoms to suggest acute infection.  No dyspnea or chest pain to suggest acute chest.  No skin rash or swelling of the extremities.    Past Medical History  Past Medical History:   Diagnosis Date     Anxiety      Bleeding disorder (H)      Blood clotting disorder (H)      Cerebral infarction (H) 2015     Cognitive developmental delay     low IQ. Please recognize when managing pain and planning with her     Depressive disorder      Hemiplegia and hemiparesis following cerebral infarction affecting right dominant side (H)     right hand contractures     Iron overload due to repeated red blood cell transfusions      Migraines      Multiple subsegmental pulmonary emboli without acute cor pulmonale (H) 02/01/2021     Oppositional defiant behavior      Superficial venous thrombosis of arm, right 03/25/2021     Uncomplicated asthma      Past Surgical History:   Procedure Laterality Date     AS INSERT TUNNELED CV 2 CATH W/O PORT/PUMP       CHOLECYSTECTOMY       CV RIGHT HEART CATH MEASUREMENTS RECORDED N/A 11/18/2021    Procedure: Right Heart Cath;  Surgeon: Jackson Stauffer MD;  Location:  HEART CARDIAC CATH LAB     INSERT PORT VASCULAR ACCESS Left 4/21/2021    Procedure: INSERTION, VASCULAR ACCESS PORT (NOT SURE ON SIDE UNTIL REMOVAL);  Surgeon: Rajan More MD;  Location: [facility] OR     IR CHEST PORT PLACEMENT > 5 YRS OF AGE  4/21/2021     IR CVC NON TUNNEL LINE REMOVAL  5/6/2021     IR CVC NON TUNNEL  PLACEMENT > 5 YRS  04/07/2020     IR CVC NON TUNNEL PLACEMENT > 5 YRS  4/30/2021     IR CVC NON TUNNEL PLACEMENT > 5 YRS  9/7/2022     IR PORT REMOVAL LEFT  4/21/2021     REMOVE PORT VASCULAR ACCESS Left 4/21/2021    Procedure: REMOVAL, VASCULAR ACCESS PORT LEFT;  Surgeon: Rajan More MD;  Location: [facility] OR     REPAIR TENDON ELBOW Right 10/02/2019    Procedure: Right Elbow Flexor Lengthening, Flexor Pronator Slide Of Wrist and Finger, Thumb Adductor Lengthening;  Surgeon: Anai Franco MD;  Location: [facility] OR     TONSILLECTOMY Bilateral 10/02/2019    Procedure: Bilateral Tonsillectomy;  Surgeon: Farhana Guy MD;  Location: [facility] OR     ZZC BREAST REDUCTION (INCLUDES LIPO) TIER 3 Bilateral 04/23/2019     acetaminophen (TYLENOL) 325 MG tablet  albuterol (PROVENTIL) (2.5 MG/3ML) 0.083% neb solution  aspirin (ASA) 81 MG chewable tablet  budesonide-formoterol (SYMBICORT) 160-4.5 MCG/ACT Inhaler  deferasirox (JADENU) 360 MG tablet  Deferiprone, Twice Daily, 1000 MG TABS  EPINEPHrine (ANY BX GENERIC EQUIV) 0.3 MG/0.3ML injection 2-pack  Hydroxyurea 1000 MG TABS  ondansetron (ZOFRAN) 8 MG tablet  oxyCODONE IR (ROXICODONE) 15 MG tablet      Allergies   Allergen Reactions     Contrast Dye      Hives and breathing issues     Fish-Derived Products Hives     Seafood Hives     Diagnostic X-Ray Materials      Gadolinium      Family History  Family History   Problem Relation Age of Onset     Sickle Cell Trait Mother      Hypertension Mother      Asthma Mother      Sickle Cell Trait Father      Glaucoma No family hx of      Macular Degeneration No family hx of      Diabetes No family hx of      Gout No family hx of      Social History   Social History     Tobacco Use     Smoking status: Never     Smokeless tobacco: Never   Substance Use Topics     Alcohol use: Not Currently     Alcohol/week: 0.0 standard drinks     Drug use: Never      Past medical history, past surgical history, medications, allergies, family  "history, and social history were reviewed with the patient. No additional pertinent items.       Review of Systems  A complete review of systems was performed with pertinent positives and negatives noted in the HPI, and all other systems negative.    Physical Exam   BP: 133/79  Pulse: 91  Temp: 97.5  F (36.4  C)  Resp: 16  Height: 162.6 cm (5' 4\")  Weight: 77.1 kg (170 lb)  SpO2: 98 %     Physical Exam  Gen:A&Ox3, uncomfortable.  HEENT: head atraumatic  Neck:no bony tenderness or step offs, no JVD, trachea midline  Back: no CVA tenderness, no midline bony tenderness  CV:RRR without murmurs  PULM:Clear to auscultation bilaterally  Abd:soft, nontender, nondistended. Bowel sounds present and normal  UE:No traumatic injuries, skin normal  LE:no traumatic injuries, skin normal, no LE edema  Neuro: gait and coordination grossly normal   Skin: no rashes or ecchymoses      ED Course      Procedures       Results for orders placed or performed during the hospital encounter of 11/03/22   [facility] Draw     Status: None    Narrative    The following orders were created for panel order [facility] Draw.  Procedure                               Abnormality         Status                     ---------                               -----------         ------                     Extra Blue Top Tube[[id number]]                              Final result               Extra Red Top Tube[[id number]]                               Final result               Extra Green Top (Lithium...[[id number]]                      Final result               Extra Purple Top Tube[[id number]]                            Final result                 Please view results for these tests on the individual orders.   Extra Blue Top Tube     Status: None   Result Value Ref Range    Hold Specimen JIC    Extra Red Top Tube     Status: None   Result Value Ref Range    Hold Specimen JIC    Extra Green Top (Lithium Heparin) Tube     Status: None   Result Value Ref Range    " Hold Specimen JIC    Extra Purple Top Tube     Status: None   Result Value Ref Range    Hold Specimen JIC    CBC with platelets and differential     Status: Abnormal   Result Value Ref Range    WBC Count 23.4 (H) 4.0 - 11.0 10e3/uL    RBC Count 2.66 (L) 3.80 - 5.20 10e6/uL    Hemoglobin 7.8 (L) 11.7 - 15.7 g/dL    Hematocrit 22.7 (L) 35.0 - 47.0 %    MCV 85 78 - 100 fL    MCH 29.3 26.5 - 33.0 pg    MCHC 34.4 31.5 - 36.5 g/dL    RDW 21.5 (H) 10.0 - 15.0 %    Platelet Count 492 (H) 150 - 450 10e3/uL    % Neutrophils 78 %    % Lymphocytes 11 %    % Monocytes 5 %    % Eosinophils 4 %    % Basophils 1 %    % Immature Granulocytes 1 %    NRBCs per 100 WBC 1 (H) <1 /100    Absolute Neutrophils 18.4 (H) 1.6 - 8.3 10e3/uL    Absolute Lymphocytes 2.6 0.8 - 5.3 10e3/uL    Absolute Monocytes 1.2 0.0 - 1.3 10e3/uL    Absolute Eosinophils 0.8 (H) 0.0 - 0.7 10e3/uL    Absolute Basophils 0.3 (H) 0.0 - 0.2 10e3/uL    Absolute Immature Granulocytes 0.1 <=0.4 10e3/uL    Absolute NRBCs 0.2 10e3/uL   Reticulocyte count     Status: Abnormal   Result Value Ref Range    % Reticulocyte 17.5 (H) 0.5 - 2.0 %    Absolute Reticulocyte 0.464 (H) 0.025 - 0.095 10e6/uL   CBC with platelets differential     Status: Abnormal    Narrative    The following orders were created for panel order CBC with platelets differential.  Procedure                               Abnormality         Status                     ---------                               -----------         ------                     CBC with platelets and d...[[id number]]  Abnormal            Final result                 Please view results for these tests on the individual orders.     Medications   ketamine (KETALAR) injection 4 mg (4 mg Intravenous Given 11/3/22 0338)   ketorolac (TORADOL) injection 15 mg (15 mg Intravenous Given 11/3/22 0339)   ondansetron (ZOFRAN) injection 8 mg (8 mg Intravenous Given 11/3/22 0339)   ketamine (KETALAR) injection 4 mg (4 mg Intravenous Given 11/3/22  0454)        Assessments & Plan (with Medical Decision Making)   21 yo F with sickle cell disease presenting with pain crisis. Having pain all over the extremities and back. No trauma or recent infection illness symptoms. No chest pain.   Arrives afebrile and hemodynamically stable.   IV access achieved via port-a-cath.   ED pain management care plan reviewed with the patient.   She declined PO oxycodone today, did administer IV ketamine 4mg x2, zofran, IV fluid, toradol.   Pain improved to manageable levels.   Labs notable for WBC of 23, up from 16 last month. Hgb down to 7.8. Retic count 17%.   Discharged with follow up with Hematology.     I have reviewed the nursing notes. I have reviewed the findings, diagnosis, plan and need for follow up with the patient.    Discharge Medication List as of 11/3/2022  5:49 AM          Final diagnoses:   Sickle cell pain crisis (H)       --  Cornelia Malloy MD   [facility] EMERGENCY DEPARTMENT  11/3/2022     Cornelia Malloy MD  11/04/22 0911

## 2022-11-04 DIAGNOSIS — D57.00 SICKLE CELL PAIN CRISIS (H): ICD-10-CM

## 2022-11-04 RX ORDER — OXYCODONE HYDROCHLORIDE 15 MG/1
15 TABLET ORAL EVERY 4 HOURS PRN
Qty: 40 TABLET | Refills: 0 | Status: SHIPPED | OUTPATIENT
Start: 2022-11-04 | End: 2022-11-10

## 2022-11-04 NOTE — TELEPHONE ENCOUNTER
Patient calls and asks about an infusion time for today.  She is advised that we do not have any available appointments yet.    She requests that her oxycodone refill be sent to Patricia Mantilla CNP as she is out over the weekend.  She is advised that this was sent to Dr. Duncan at the time of her call this morning.  She would still like to make sure this is filled today if possible.

## 2022-11-04 NOTE — TELEPHONE ENCOUNTER
Oncology Nurse Triage - Sickle Cell Pain Crisis:    Situation: Jennifer (pt) calling about Sickle Cell Pain Crisis    Background:     Patient's last infusion was 11/3/22  Last clinic visit date:9/26/22 Dr. Duncan  Next follow-up appt on 11/17/22 Dr. Duncan  Does patient have active treatment plan?  Yes    pt was in ED yesterday.     Assessment of Symptoms:  Onset/Duration of symptoms: 1 day    Is it typical sickle cell pain? Yes   Location: general body  Character: Sharp           Intensity: 8/10    Any radiation of pain, numbness, tingling, weakness, warmth, swelling, discoloration of extremities?No     Fever?No    Chest Pain Present: No     Shortness of breath: No     Other home therapies tried: HEAT/HEATING PAD     Last home medication taken and when: 6:00am last oxycodone    Any Refills Needed?: Yes     Does patient have transportation & length of time to get to clinic: Yes       Grades for reference:       Grade 1 -   o Patient has active treatment plan.   o Patients last scheduled clinic appointment was attended  o Patient has not been seen in infusion or ED in the last 3 days (clarify exclusions in therapy plans)   o Afebrile   o Typically sickle cell pain   o Home medications have been tried   o No other symptoms       Grade 2 -   o Temperature of over 100.0   o Different sickle cell pain   o Decreased PO intake   o Arm or left swelling   o ED or infusion visit within the last 3 days.   o Out of home medications      Grade 3 -   o New chest pain   o New shortness of breath  o Change in mental status     Recommendations:   0725 Paged Dr Duncan as pt was in ED yesterday.     If you do not hear from the infusion center by 2pm then you will not be able to get in for an infusion today. If symptoms worsen while waiting for call back, and/or you experience fever, chills, SOB, chest pain, cough, n/v, dizziness, numbness, swelling, discoloration of extremities, then seek emergency evaluation in Emergency Department.      Please note, if you are late for your appt, you risk losing your infusion appt as it may delay another patient's infusion who arrived on time.

## 2022-11-07 ENCOUNTER — PATIENT OUTREACH (OUTPATIENT)
Dept: CARE COORDINATION | Facility: CLINIC | Age: 22
End: 2022-11-07

## 2022-11-07 ENCOUNTER — INFUSION THERAPY VISIT (OUTPATIENT)
Dept: TRANSPLANT | Facility: CLINIC | Age: 22
End: 2022-11-07
Attending: PEDIATRICS
Payer: COMMERCIAL

## 2022-11-07 ENCOUNTER — TELEPHONE (OUTPATIENT)
Dept: ONCOLOGY | Facility: CLINIC | Age: 22
End: 2022-11-07

## 2022-11-07 VITALS
OXYGEN SATURATION: 98 % | TEMPERATURE: 98 F | SYSTOLIC BLOOD PRESSURE: 121 MMHG | DIASTOLIC BLOOD PRESSURE: 74 MMHG | HEART RATE: 98 BPM | RESPIRATION RATE: 18 BRPM

## 2022-11-07 DIAGNOSIS — D57.00 SICKLE CELL PAIN CRISIS (H): ICD-10-CM

## 2022-11-07 DIAGNOSIS — G81.10 SPASTIC HEMIPLEGIA, UNSPECIFIED ETIOLOGY, UNSPECIFIED LATERALITY (H): Primary | ICD-10-CM

## 2022-11-07 PROCEDURE — 96376 TX/PRO/DX INJ SAME DRUG ADON: CPT

## 2022-11-07 PROCEDURE — 258N000003 HC RX IP 258 OP 636: Performed by: PEDIATRICS

## 2022-11-07 PROCEDURE — 96374 THER/PROPH/DIAG INJ IV PUSH: CPT

## 2022-11-07 PROCEDURE — 250N000011 HC RX IP 250 OP 636: Performed by: PEDIATRICS

## 2022-11-07 PROCEDURE — 96361 HYDRATE IV INFUSION ADD-ON: CPT

## 2022-11-07 RX ORDER — ONDANSETRON 8 MG/1
8 TABLET, FILM COATED ORAL
Status: CANCELLED
Start: 2023-01-01

## 2022-11-07 RX ORDER — HEPARIN SODIUM (PORCINE) LOCK FLUSH IV SOLN 100 UNIT/ML 100 UNIT/ML
5 SOLUTION INTRAVENOUS ONCE
Status: COMPLETED | OUTPATIENT
Start: 2022-11-07 | End: 2022-11-07

## 2022-11-07 RX ORDER — DIPHENHYDRAMINE HCL 25 MG
25 CAPSULE ORAL
Status: DISCONTINUED | OUTPATIENT
Start: 2022-11-07 | End: 2022-11-07 | Stop reason: HOSPADM

## 2022-11-07 RX ORDER — HEPARIN SODIUM,PORCINE 10 UNIT/ML
5 VIAL (ML) INTRAVENOUS
Status: CANCELLED | OUTPATIENT
Start: 2023-01-01

## 2022-11-07 RX ORDER — MORPHINE SULFATE 2 MG/ML
2 INJECTION, SOLUTION INTRAMUSCULAR; INTRAVENOUS
Status: CANCELLED
Start: 2023-01-01

## 2022-11-07 RX ORDER — ONDANSETRON 8 MG/1
8 TABLET, ORALLY DISINTEGRATING ORAL
Status: DISCONTINUED | OUTPATIENT
Start: 2022-11-07 | End: 2022-11-07 | Stop reason: HOSPADM

## 2022-11-07 RX ORDER — DIPHENHYDRAMINE HCL 25 MG
25 CAPSULE ORAL
Status: CANCELLED
Start: 2023-01-01

## 2022-11-07 RX ORDER — MORPHINE SULFATE 2 MG/ML
2 INJECTION, SOLUTION INTRAMUSCULAR; INTRAVENOUS
Status: DISCONTINUED | OUTPATIENT
Start: 2022-11-07 | End: 2022-11-07 | Stop reason: HOSPADM

## 2022-11-07 RX ORDER — HEPARIN SODIUM (PORCINE) LOCK FLUSH IV SOLN 100 UNIT/ML 100 UNIT/ML
5 SOLUTION INTRAVENOUS
Status: CANCELLED | OUTPATIENT
Start: 2023-01-01

## 2022-11-07 RX ADMIN — SODIUM CHLORIDE, PRESERVATIVE FREE 5 ML: 5 INJECTION INTRAVENOUS at 16:16

## 2022-11-07 RX ADMIN — MORPHINE SULFATE 2 MG: 2 INJECTION, SOLUTION INTRAMUSCULAR; INTRAVENOUS at 14:08

## 2022-11-07 RX ADMIN — MORPHINE SULFATE 2 MG: 2 INJECTION, SOLUTION INTRAMUSCULAR; INTRAVENOUS at 15:12

## 2022-11-07 RX ADMIN — MORPHINE SULFATE 2 MG: 2 INJECTION, SOLUTION INTRAMUSCULAR; INTRAVENOUS at 13:06

## 2022-11-07 RX ADMIN — DEXTROSE AND SODIUM CHLORIDE 1000 ML: 5; 450 INJECTION, SOLUTION INTRAVENOUS at 13:08

## 2022-11-07 ASSESSMENT — PAIN SCALES - GENERAL: PAINLEVEL: EXTREME PAIN (8)

## 2022-11-07 NOTE — TELEPHONE ENCOUNTER
Pt called in to triage requesting IVF pain meds for generalized pain rated 9/10 x 4 days. Stated last took prn oxycodone at 6am  this morning without relief. Denied any fevers, chills, cough, sob, chest pain, numbness or tingling or other symptoms not typical of sickle cell pain.   Pt's last infusion was 10/31/22, last clinic visit 9/26/22 Dr Duncan with follow-up on 11/17/22 with Patricia Mantilla .   Patient states they do not have own ride and it will take 60 minutes long to get to cancer clinic.   Pt denied being out of home medications and needing any refills today.   Pt qualifies for sickle cell standing order protocol.  If you do not hear from the infusion center by 2pm then you will not be able to get in for an infusion today.   Please note, if you are late for your appt, you risk losing your infusion appt as it may delay another patient's infusion who arrived on time.     BMt can take for Infusion at 1:00pm     Call placed to Jennifer to let her her know that they can take her at 1:00pm.     Message sent to [facility] to schedule and to Social work for a ride.

## 2022-11-07 NOTE — PROGRESS NOTES
Infusion Nursing Note:  Jennifer Cervantes presents today for add-on infusion.    Patient seen by provider today: No   present during visit today: Not Applicable.    Note: Patient received 1 L D5NaCl bolus and morphine x3 for 9/10 upper generalized pain with relief. Stable upon discharge.    Intravenous Access:  Implanted Port.    Treatment Conditions:  Results reviewed, labs MET treatment parameters, ok to proceed with treatment.    Post Infusion Assessment:  Patient tolerated infusion without incident.     Discharge Plan:   Patient and/or family verbalized understanding of discharge instructions and all questions answered.      Vicenta Boone RN

## 2022-11-07 NOTE — LETTER
11/7/2022         RE: Jennifer Cervantes  [address]  [place] [address]        Dear Colleague,    Thank you for referring your patient, Jennifer Cervantes, to the [facility] BLOOD AND MARROW TRANSPLANT PROGRAM [place]. Please see a copy of my visit note below.    Infusion Nursing Note:  Jennifer Cervantes presents today for add-on infusion.    Patient seen by provider today: No   present during visit today: Not Applicable.    Note: Patient received 1 L D5NaCl bolus and morphine x3 for 9/10 upper generalized pain with relief. Stable upon discharge.    Intravenous Access:  Implanted Port.    Treatment Conditions:  Results reviewed, labs MET treatment parameters, ok to proceed with treatment.    Post Infusion Assessment:  Patient tolerated infusion without incident.     Discharge Plan:   Patient and/or family verbalized understanding of discharge instructions and all questions answered.      Vicenta Boone RN                          Again, thank you for allowing me to participate in the care of your patient.        Sincerely,        No name on file

## 2022-11-07 NOTE — PROGRESS NOTES
Social Work Note: Telephone Call  Oncology Clinic     Data/Intervention:  Patient Name:  Jennifer Cervantes DOB/Age: 2000     Call From: [facility]        Reason for Call:  Transportation     Assessment:   called "[employer] to arrange ride through patient's insurance. [employer] | mentor | invest Ride arranged  for patient from home with [employer] ([phone]).  Patient will need to call when ready for return ride home ([phone]).      Plan:  Patient is aware of the transportation plan.  available to assist with any other needs.      CARLOS Chavez,[facility]  Hematology/Oncology Social Worker  Phone:[phone] Pager: [phone]

## 2022-11-10 ENCOUNTER — TELEPHONE (OUTPATIENT)
Dept: ONCOLOGY | Facility: CLINIC | Age: 22
End: 2022-11-10

## 2022-11-10 ENCOUNTER — PATIENT OUTREACH (OUTPATIENT)
Dept: CARE COORDINATION | Facility: CLINIC | Age: 22
End: 2022-11-10

## 2022-11-10 ENCOUNTER — INFUSION THERAPY VISIT (OUTPATIENT)
Dept: ONCOLOGY | Facility: CLINIC | Age: 22
End: 2022-11-10
Attending: PEDIATRICS
Payer: COMMERCIAL

## 2022-11-10 VITALS
HEART RATE: 98 BPM | SYSTOLIC BLOOD PRESSURE: 125 MMHG | OXYGEN SATURATION: 95 % | RESPIRATION RATE: 16 BRPM | DIASTOLIC BLOOD PRESSURE: 79 MMHG | TEMPERATURE: 98.4 F

## 2022-11-10 DIAGNOSIS — D57.00 SICKLE CELL PAIN CRISIS (H): ICD-10-CM

## 2022-11-10 DIAGNOSIS — G81.10 SPASTIC HEMIPLEGIA, UNSPECIFIED ETIOLOGY, UNSPECIFIED LATERALITY (H): Primary | ICD-10-CM

## 2022-11-10 PROCEDURE — 96361 HYDRATE IV INFUSION ADD-ON: CPT

## 2022-11-10 PROCEDURE — 258N000003 HC RX IP 258 OP 636: Performed by: PEDIATRICS

## 2022-11-10 PROCEDURE — 96374 THER/PROPH/DIAG INJ IV PUSH: CPT

## 2022-11-10 PROCEDURE — 250N000011 HC RX IP 250 OP 636: Performed by: PEDIATRICS

## 2022-11-10 PROCEDURE — 96376 TX/PRO/DX INJ SAME DRUG ADON: CPT

## 2022-11-10 RX ORDER — OXYCODONE HYDROCHLORIDE 15 MG/1
15 TABLET ORAL EVERY 4 HOURS PRN
Qty: 40 TABLET | Refills: 0 | Status: SHIPPED | OUTPATIENT
Start: 2022-11-10 | End: 2022-11-17

## 2022-11-10 RX ORDER — ONDANSETRON 8 MG/1
8 TABLET, FILM COATED ORAL
Status: DISCONTINUED | OUTPATIENT
Start: 2022-11-10 | End: 2022-11-10 | Stop reason: HOSPADM

## 2022-11-10 RX ORDER — DIPHENHYDRAMINE HCL 25 MG
25 CAPSULE ORAL
Status: CANCELLED
Start: 2023-01-01

## 2022-11-10 RX ORDER — ONDANSETRON 8 MG/1
8 TABLET, ORALLY DISINTEGRATING ORAL
Status: DISCONTINUED | OUTPATIENT
Start: 2022-11-10 | End: 2022-11-10 | Stop reason: HOSPADM

## 2022-11-10 RX ORDER — ONDANSETRON 8 MG/1
8 TABLET, FILM COATED ORAL
Status: CANCELLED
Start: 2023-01-01

## 2022-11-10 RX ORDER — MORPHINE SULFATE 2 MG/ML
2 INJECTION, SOLUTION INTRAMUSCULAR; INTRAVENOUS
Status: COMPLETED | OUTPATIENT
Start: 2022-11-10 | End: 2022-11-10

## 2022-11-10 RX ORDER — MORPHINE SULFATE 2 MG/ML
2 INJECTION, SOLUTION INTRAMUSCULAR; INTRAVENOUS
Status: CANCELLED
Start: 2023-01-01

## 2022-11-10 RX ORDER — HEPARIN SODIUM,PORCINE 10 UNIT/ML
5 VIAL (ML) INTRAVENOUS
Status: CANCELLED | OUTPATIENT
Start: 2023-01-01

## 2022-11-10 RX ORDER — DIPHENHYDRAMINE HCL 25 MG
25 CAPSULE ORAL
Status: DISCONTINUED | OUTPATIENT
Start: 2022-11-10 | End: 2022-11-10 | Stop reason: HOSPADM

## 2022-11-10 RX ORDER — HEPARIN SODIUM (PORCINE) LOCK FLUSH IV SOLN 100 UNIT/ML 100 UNIT/ML
5 SOLUTION INTRAVENOUS
Status: DISCONTINUED | OUTPATIENT
Start: 2022-11-10 | End: 2022-11-10 | Stop reason: HOSPADM

## 2022-11-10 RX ORDER — HEPARIN SODIUM (PORCINE) LOCK FLUSH IV SOLN 100 UNIT/ML 100 UNIT/ML
5 SOLUTION INTRAVENOUS
Status: CANCELLED | OUTPATIENT
Start: 2023-01-01

## 2022-11-10 RX ADMIN — MORPHINE SULFATE 2 MG: 2 INJECTION, SOLUTION INTRAMUSCULAR; INTRAVENOUS at 13:41

## 2022-11-10 RX ADMIN — MORPHINE SULFATE 2 MG: 2 INJECTION, SOLUTION INTRAMUSCULAR; INTRAVENOUS at 15:40

## 2022-11-10 RX ADMIN — MORPHINE SULFATE 2 MG: 2 INJECTION, SOLUTION INTRAMUSCULAR; INTRAVENOUS at 14:42

## 2022-11-10 RX ADMIN — Medication 5 ML: at 15:45

## 2022-11-10 RX ADMIN — DEXTROSE AND SODIUM CHLORIDE 1000 ML: 5; 450 INJECTION, SOLUTION INTRAVENOUS at 13:39

## 2022-11-10 ASSESSMENT — PAIN SCALES - GENERAL: PAINLEVEL: WORST PAIN (10)

## 2022-11-10 NOTE — TELEPHONE ENCOUNTER
Narcotic Refill Request    Medication(s) requested:  Oxycodne   Person Requesting Refill:Jennifer   What pain is the medication treating: Sickle cell pain   How is the medication being taken?:Takes 1 tab every 4 hours 6 tabs max per day   Does pt have enough for today? Yes   Is pain being adequately controlled on the current regimen?: yes   Experiencing any side effects from medication?: No     Date of most recent appointment:  9/26/22 Dr Duncan  Any No Show Visits:no   Next appointment:   11/17/22 farhan Mantilla   Last fill date and by whom:  11/4/22 Farhan Mantilla    Reviewed: No access     Routed  provider: Dr Duncan

## 2022-11-10 NOTE — PROGRESS NOTES
This is a recent snapshot of the patient's [facility] medical record.  For current drug dose and complete information and questions, call [phone] or In Basket pool, fv home infusion ([id number])  CSN Number:  [id number]

## 2022-11-10 NOTE — PATIENT INSTRUCTIONS
[facility] Triage and after hours / weekends / holidays:  [phone]    Please call the triage or after hours line if you experience a temperature greater than or equal to 100.4, shaking chills, have uncontrolled nausea, vomiting and/or diarrhea, dizziness, shortness of breath, chest pain, bleeding, unexplained bruising, or if you have any other new/concerning symptoms, questions or concerns.      If you are having any concerning symptoms or wish to speak to a provider before your next infusion visit, please call your care coordinator or triage to notify them so we can adequately serve you.     If you need a refill on a narcotic prescription or other medication, please call before your infusion appointment.

## 2022-11-10 NOTE — TELEPHONE ENCOUNTER
Pt called in to triage requesting IVF pain meds for generalized pain rated 10/10 x 2 days. Stated last took prn oxycodone at 6am  this morning without relief. Denied any fevers, chills, cough, sob, chest pain, numbness or tingling or other symptoms not typical of sickle cell pain.   Pt's last infusion was 11/7/22 , last clinic visit 9/26/22 Dr Duncan with follow-up on 11/17/22 with Patricia Mantilla .   Patient states they do not have own ride and it will take 60 minutes long to get to cancer clinic.   Patient needs refill today separate refill encounter sent.   Pt qualifies for sickle cell standing order protocol.  If you do not hear from the infusion center by 2pm then you will not be able to get in for an infusion today.   Please note, if you are late for your appt, you risk losing your infusion appt as it may delay another patient's infusion who arrived on time.     Ange Take at 1:30pm     Call placed to Jennifer and she can take the 1:30 appointment     Message sent to  to schedule transportation,    Message sent to [facility] to schedule appointments.

## 2022-11-10 NOTE — PROGRESS NOTES
Infusion Nursing Note:  Jennifer Cervantes presents today for IV fluids and Pain Medication.        Note:  Jennifer has been afebrile and denies signs and symptoms of infection including: cough, SOB, sore throat, diarrhea, vomiting, rash, or pain with urination.      Today Jennifer is c/o generalized body pain at 10/10.  She received 3 doses of IV morphine.  Prior to discharge pain was 5/10.  Pt felt comfortable going home to manage her pain there.    Intravenous Access:  Implanted Port.    Discharge Plan:   Prescription refills given for oxycodone.  Pt picked this up downstairs.  Copy of AVS reviewed with patient and/or family.  Patient will return 7/17/22 to see Patricia Mantilla in clinic    Marina Leblanc RN

## 2022-11-14 ENCOUNTER — NURSE TRIAGE (OUTPATIENT)
Dept: ONCOLOGY | Facility: CLINIC | Age: 22
End: 2022-11-14

## 2022-11-14 ENCOUNTER — INFUSION THERAPY VISIT (OUTPATIENT)
Dept: TRANSPLANT | Facility: CLINIC | Age: 22
End: 2022-11-14
Attending: PEDIATRICS
Payer: COMMERCIAL

## 2022-11-14 ENCOUNTER — PATIENT OUTREACH (OUTPATIENT)
Dept: CARE COORDINATION | Facility: CLINIC | Age: 22
End: 2022-11-14

## 2022-11-14 VITALS
TEMPERATURE: 98.2 F | DIASTOLIC BLOOD PRESSURE: 68 MMHG | OXYGEN SATURATION: 95 % | SYSTOLIC BLOOD PRESSURE: 114 MMHG | HEART RATE: 99 BPM | RESPIRATION RATE: 18 BRPM

## 2022-11-14 DIAGNOSIS — D57.00 SICKLE CELL PAIN CRISIS (H): ICD-10-CM

## 2022-11-14 DIAGNOSIS — G81.10 SPASTIC HEMIPLEGIA, UNSPECIFIED ETIOLOGY, UNSPECIFIED LATERALITY (H): Primary | ICD-10-CM

## 2022-11-14 PROCEDURE — 96376 TX/PRO/DX INJ SAME DRUG ADON: CPT

## 2022-11-14 PROCEDURE — 96374 THER/PROPH/DIAG INJ IV PUSH: CPT

## 2022-11-14 PROCEDURE — 250N000011 HC RX IP 250 OP 636: Performed by: PEDIATRICS

## 2022-11-14 PROCEDURE — 258N000003 HC RX IP 258 OP 636: Performed by: PEDIATRICS

## 2022-11-14 PROCEDURE — 96361 HYDRATE IV INFUSION ADD-ON: CPT

## 2022-11-14 RX ORDER — ONDANSETRON 8 MG/1
8 TABLET, FILM COATED ORAL
Status: CANCELLED
Start: 2023-01-01

## 2022-11-14 RX ORDER — MORPHINE SULFATE 2 MG/ML
2 INJECTION, SOLUTION INTRAMUSCULAR; INTRAVENOUS
Status: DISCONTINUED | OUTPATIENT
Start: 2022-11-14 | End: 2022-11-14 | Stop reason: HOSPADM

## 2022-11-14 RX ORDER — HEPARIN SODIUM (PORCINE) LOCK FLUSH IV SOLN 100 UNIT/ML 100 UNIT/ML
5 SOLUTION INTRAVENOUS
Status: DISCONTINUED | OUTPATIENT
Start: 2022-11-14 | End: 2022-11-14 | Stop reason: HOSPADM

## 2022-11-14 RX ORDER — ONDANSETRON 8 MG/1
8 TABLET, FILM COATED ORAL
Status: DISCONTINUED | OUTPATIENT
Start: 2022-11-14 | End: 2022-11-14 | Stop reason: HOSPADM

## 2022-11-14 RX ORDER — HEPARIN SODIUM (PORCINE) LOCK FLUSH IV SOLN 100 UNIT/ML 100 UNIT/ML
5 SOLUTION INTRAVENOUS
Status: CANCELLED | OUTPATIENT
Start: 2023-01-01

## 2022-11-14 RX ORDER — HEPARIN SODIUM,PORCINE 10 UNIT/ML
5 VIAL (ML) INTRAVENOUS
Status: CANCELLED | OUTPATIENT
Start: 2023-01-01

## 2022-11-14 RX ORDER — MORPHINE SULFATE 2 MG/ML
2 INJECTION, SOLUTION INTRAMUSCULAR; INTRAVENOUS
Status: CANCELLED
Start: 2023-01-01

## 2022-11-14 RX ORDER — DIPHENHYDRAMINE HCL 25 MG
25 CAPSULE ORAL
Status: CANCELLED
Start: 2023-01-01

## 2022-11-14 RX ORDER — DIPHENHYDRAMINE HCL 25 MG
25 CAPSULE ORAL
Status: DISCONTINUED | OUTPATIENT
Start: 2022-11-14 | End: 2022-11-14 | Stop reason: HOSPADM

## 2022-11-14 RX ADMIN — DEXTROSE AND SODIUM CHLORIDE 1000 ML: 5; 450 INJECTION, SOLUTION INTRAVENOUS at 13:10

## 2022-11-14 RX ADMIN — Medication 5 ML: at 15:16

## 2022-11-14 RX ADMIN — MORPHINE SULFATE 2 MG: 2 INJECTION, SOLUTION INTRAMUSCULAR; INTRAVENOUS at 14:15

## 2022-11-14 RX ADMIN — MORPHINE SULFATE 2 MG: 2 INJECTION, SOLUTION INTRAMUSCULAR; INTRAVENOUS at 13:14

## 2022-11-14 RX ADMIN — MORPHINE SULFATE 2 MG: 2 INJECTION, SOLUTION INTRAMUSCULAR; INTRAVENOUS at 15:14

## 2022-11-14 NOTE — PROGRESS NOTES
Social Work Note: Telephone Call  Oncology Clinic     Data/Intervention:  Patient Name:  Jennifer Cervantes  /Age: 2000, 22 years old     Call From: [facility]        Reason for Call:  Transportation     Assessment:   called [employer] ([phone]) to arrange ride. [employer] arranged  for patient from home with a will call return ride.  Patient will need to call when ready for return ride home ([phone]).      Plan:  Patient is aware of the transportation plan.  available to assist with any other needs.      CARLOS Chavez,[facility]  Hematology/Oncology Social Worker  Phone:[phone] Pager: [phone]

## 2022-11-14 NOTE — PROGRESS NOTES
Infusion Nursing Note:  Jennifer Cervantes presents today for IVF and pain meds .    Patient seen by provider today: No   present during visit today: Not Applicable.    Note: Jennifer presents today with 8/10 pain to her lower back. She received 1L D51/2NS bolus and total of 6mg IV morphine. She tolerated this well, pain was decreased to an acceptable level per pt on discharge.     Intravenous Access:  Implanted Port.    Treatment Conditions:  Not Applicable.    Post Infusion Assessment:  Patient tolerated infusion without incident.  Blood return noted pre and post infusion.  Site patent and intact, free from redness, edema or discomfort.  No evidence of extravasations.  Access discontinued per protocol.     Discharge Plan:   Patient discharged in stable condition accompanied by: self.  Departure Mode: Ambulatory.      Allison Bowman RN

## 2022-11-14 NOTE — TELEPHONE ENCOUNTER
BMT can offer apt at 1330.  Called Jennifer who confirmed she can come at 1330.  Message sent to SW to assist with Transportation.  Message sent to [facility] to schedule.  Updated Charge Nurse.

## 2022-11-14 NOTE — TELEPHONE ENCOUNTER
[facility] Triage    Pt called in to triage requesting IVF pain meds for generalized pain rated 10/10  X 3 days. Stated last took oxy this morning without relief. Denied any fevers, chills, cough, sob, chest pain or other symptoms.  Assess for confusion, numbness, paralysis, vomiting, headache, vision issues, difficulty walking and/or talking. Pt's last infusion was 11/10/22, last clinic visit 11/3/22 ED and 9/26/22 [facility]  with follow-up on 11/17 with Jose Rafael and has labs. Patient states they do not have own ride and it will take 60 minutes to get to cancer clinic. Pt denied being out of home medications and needing any refills today. Pt qualifies for sickle cell standing order protocol.    Placed on waiting list.  Advised Jennifer if we did not call by 2 pm and she was still a 10/10 she should go to the ED.  Jennifer verbally acknowledged understanding of advice.    Jennifer as IVF and pain meds today in BMT at 130.

## 2022-11-14 NOTE — LETTER
11/14/2022         RE: Jennifer Cervantes  [address]  [place] [address]        Dear Colleague,    Thank you for referring your patient, Jennifer Cervantes, to the [facility] BLOOD AND MARROW TRANSPLANT PROGRAM [place]. Please see a copy of my visit note below.    Infusion Nursing Note:  Jennifer Cervantes presents today for IVF and pain meds .    Patient seen by provider today: No   present during visit today: Not Applicable.    Note: Jennifer presents today with 8/10 pain to her lower back. She received 1L D51/2NS bolus and total of 6mg IV morphine. She tolerated this well, pain was decreased to an acceptable level per pt on discharge.     Intravenous Access:  Implanted Port.    Treatment Conditions:  Not Applicable.    Post Infusion Assessment:  Patient tolerated infusion without incident.  Blood return noted pre and post infusion.  Site patent and intact, free from redness, edema or discomfort.  No evidence of extravasations.  Access discontinued per protocol.     Discharge Plan:   Patient discharged in stable condition accompanied by: self.  Departure Mode: Ambulatory.      Allison Bowman RN                          Again, thank you for allowing me to participate in the care of your patient.        Sincerely,        No name on file

## 2022-11-16 DIAGNOSIS — D57.1 HB-SS DISEASE WITHOUT CRISIS (H): Primary | ICD-10-CM

## 2022-11-16 NOTE — PROGRESS NOTES
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 She had multiple thromboembolic events in 2021 despite adherent anticoagulation use (though warfarin was perpetually low) and there are concerns for chronic thromboembolic disease but did not have pulmonary HTN on a November 2021 cath. She is maintained on chronic PO opioids and twice-weekly infusion visits (since 1/24/22) but has been able to be maintained on this regimen and has stayed out of the ED most of the time with even rarer admissions.    Interval History:  Jennifer presents to clinic today for routine follow-up.  She has been seen in the emergency department once since her last hospital admission.  She notes that she would like to \"pause\" reducing her oxycodone dose at this time.  The recent change in weather has required more regular use of her oxycodone therefore she does not feel this is the best time to work on an active taper.  Most recently she has been taking oxycodone every 4-6 hours but remains consistent with taking a maximum of 6 tablets/day.  She continues to use adjunct medication such as Tylenol, ibuprofen sparingly, heat and topical patches for her pain.  She verbalizes a great desire to stay out of the emergency department.  She feels that her twice weekly infusion visits have been very helpful and does not wish to change his frequency at this time.    She also notes that she would like to resume Desferal infusions as she feels she is experiencing side effects from the " "deferiprone such as dizziness, as sores in her mouth and exacerbated pain.  She has been taking this for a few weeks and notes that she has not missed any doses.  She does remain on Jadenu as well.    She denies any recurrent unilateral swelling in her right arm following her last hospital admission.  Her breathing has been stable without any recent or worsening shortness of breath or coughing.  She denies chest pain, headaches or visual changes.      She continues to work on driving and often goes out to practice late at night with her sister when the roads are less busy.  She has also been busy reading, writing and journaling which are very therapeutic for her.  She has also halted plans to move out on her own at this time but does desire to achieve this by the spring.      Sickle Cell Disease Comprehensive Checklist    Bone Health/Avascular Necrosis Screening/Symptoms (each visit): no new concerns today    Leg Ulcer evaluation (every visit): none    Hypertension (every visit):stable, high normal    Last pulmonary evaluation (asthma, AMAN, pulm HTN): 9/28/22    Stroke/silent cerebral infarct Hx (Y/N): Yes TIA ~2014, first event ~age 1 with full stroke and R sided weakness    Last PCP Visit: 8/2020    Vaccines:  ? PCV13: 5/13/19  ? Pneumovax (PPSV23): 3/04, 10/09, 7/12/19 (next due 7/2024)  ? Menactra: 4/2010, 9/2015 (MCV done 8/16/21)  ? Influenza: will receive today 11/17/22 in infusion    Audiology (chelation): done 6/2020, normal.. However, on 6/7, \"While there is no significant change in grade on the CTCAE4.03 Scale, there were hearing changes bilaterally for both standard and extended high frequency audiometry.  Will need to determine if an ENT consult is advised due to the asymmetry in extended high frequency testing.\"     Plan last reviewed with patient: 7/11/22    Patient background: 23 yo F, enjoys movies and kids though there are times where she does not really want to talk to people. Does not have a lot " of social support at home.     Sickle Cell Disease History  Primary Hematologist Team: Jose Rafael Duncan  PCP: none  Genotype: SS  Acute Pain Crisis Treatment: (avoiding IV opioids for now, which she has agreed to)    ER   o Oxycodone 15-20 mg x1  o Ketamine 4mg IV x 2--helps with opioid sparing  o Toradol 15 mg IV x1   o Maintenance IV fluids with LR  o Other: Zofran 8 mg IV PRN nausea    Inpatient:  o Home oxycodone/Oxycontin regimen, though home oxycodone dosing could be increased to 20 mg to start  o PCA plan:   - None for now  o Other Medications: Zofran  o She has had success with ketamine starting at 4mg/h and advancing only to 6mg/h, as 8mg/h made her feel quite poorly..  o ASA  o Supportive Care: Docusate, Senna  Chronic Pain Medications:    Home regimen Oxycontin 10 mg q12h, oxycodone 15 mg p.o. q.4-6 hours p.r.n. breakthrough pain.  She also continues on Voltaren gel, and Zoloft among other medications.    -Also benefits from mental health visits, acupuncture  Baseline Hemoglobin: 7 g/dl without chronic transfusions  Hydroxyurea use: Yes  H/O blood transfusions: Yes, several (iron overload) Most recent 11/20/2021    H/O Transfusion Reactions: no    Antibodies:none  H/O Acute Chest Syndrome: Yes    Last episode:9/05/22 (previously 4/26/21, 10/2019)     ICU/intubation: not with 9/2022 admission  H/O Stroke: Yes (managed with chronic transfusions in the past, stopped late Spring 2020)  H/O VTE: Yes (2/2021)  H/O Cholecystectomy or Splenectomy: no  H/O Asthma, Pulm HTN, AVN, Leg Ulcers, Nephropathy, Retinopathy, etc: Iron overload, asthma, chronic lung disease, physical limitations from early stroke    ---------------------------------------  Jennifer Cervantes's Goals (discussed 11/17/22)    1-3 month goal:  Continue journaling and reading.    6 month goal:  Continue to work on driving     12 month goal:  Move into her own place     Disease-specific goal(s):  Continue to stay out of the hospital, be off regular  opioids in the future (previous goal was March 2023, now more likely June 2023)  ---------------------------------------      Current Outpatient Medications   Medication Sig Dispense Refill     acetaminophen (TYLENOL) 325 MG tablet Take 2 tablets (650 mg) by mouth every 6 hours as needed for mild pain 120 tablet 3     albuterol (PROVENTIL) (2.5 MG/3ML) 0.083% neb solution Take 2 vials (5 mg) by nebulization every 6 hours as needed for shortness of breath / dyspnea or wheezing 90 mL 3     aspirin (ASA) 81 MG chewable tablet Take 1 tablet (81 mg) by mouth 2 times daily 60 tablet 11     budesonide-formoterol (SYMBICORT) 160-4.5 MCG/ACT Inhaler Inhale 2 puffs into the lungs 2 times daily 10.2 g 3     deferasirox (JADENU) 360 MG tablet Take 4 tablets (1,440 mg) by mouth every evening 120 tablet 4     Deferiprone, Twice Daily, 1000 MG TABS Take 1,500 mg by mouth 2 times daily 60 tablet 1     EPINEPHrine (ANY BX GENERIC EQUIV) 0.3 MG/0.3ML injection 2-pack Inject 0.3 mLs (0.3 mg) into the muscle as needed for anaphylaxis 1 each 1     Hydroxyurea 1000 MG TABS Take 3,000 mg by mouth daily 90 tablet 3     ondansetron (ZOFRAN) 8 MG tablet Take 1 tablet (8 mg) by mouth every 8 hours as needed 30 tablet 1     oxyCODONE IR (ROXICODONE) 15 MG tablet Take 1 tablet (15 mg) by mouth every 4 hours as needed for severe pain Goal 4 per day. Max 6 per day. 40 tablet 0       Past Medical History  Past Medical History:   Diagnosis Date     Anxiety      Bleeding disorder (H)      Blood clotting disorder (H)      Cerebral infarction (H) 2015     Cognitive developmental delay     low IQ. Please recognize when managing pain and planning with her     Depressive disorder      Hemiplegia and hemiparesis following cerebral infarction affecting right dominant side (H)     right hand contractures     Iron overload due to repeated red blood cell transfusions      Migraines      Multiple subsegmental pulmonary emboli without acute cor pulmonale (H)  02/01/2021     Oppositional defiant behavior      Superficial venous thrombosis of arm, right 03/25/2021     Uncomplicated asthma      Past Surgical History:   Procedure Laterality Date     AS INSERT TUNNELED CV 2 CATH W/O PORT/PUMP       CHOLECYSTECTOMY       CV RIGHT HEART CATH MEASUREMENTS RECORDED N/A 11/18/2021    Procedure: Right Heart Cath;  Surgeon: Jackson Stauffer MD;  Location:  HEART CARDI[facility] CATH LAB     INSERT PORT VASCULAR ACCESS Left 4/21/2021    Procedure: INSERTION, VASCULAR ACCESS PORT (NOT SURE ON SIDE UNTIL REMOVAL);  Surgeon: Rajan More MD;  Location: [facility] OR     IR CHEST PORT PLACEMENT > 5 YRS OF AGE  4/21/2021     IR CVC NON TUNNEL LINE REMOVAL  5/6/2021     IR CVC NON TUNNEL PLACEMENT > 5 YRS  04/07/2020     IR CVC NON TUNNEL PLACEMENT > 5 YRS  4/30/2021     IR CVC NON TUNNEL PLACEMENT > 5 YRS  9/7/2022     IR PORT REMOVAL LEFT  4/21/2021     REMOVE PORT VASCULAR ACCESS Left 4/21/2021    Procedure: REMOVAL, VASCULAR ACCESS PORT LEFT;  Surgeon: Rajan More MD;  Location: [facility] OR     REPAIR TENDON ELBOW Right 10/02/2019    Procedure: Right Elbow Flexor Lengthening, Flexor Pronator Slide Of Wrist and Finger, Thumb Adductor Lengthening;  Surgeon: Anai Franco MD;  Location: [facility]     TONSILLECTOMY Bilateral 10/02/2019    Procedure: Bilateral Tonsillectomy;  Surgeon: Farhana Guy MD;  Location: [facility] OR     ZZC BREAST REDUCTION (INCLUDES LIPO) TIER 3 Bilateral 04/23/2019     Allergies   Allergen Reactions     Contrast Dye      Hives and breathing issues     Fish-Derived Products Hives     Seafood Hives     Diagnostic X-Ray Materials      Gadolinium      Social History   Social History     Tobacco Use     Smoking status: Never     Smokeless tobacco: Never   Substance Use Topics     Alcohol use: Not Currently     Alcohol/week: 0.0 standard drinks     Drug use: Never    Still living at home with mom and extended family.     Past medical history and social history were  reviewed.    Physical Examination:  There were no vitals taken for this visit.  Wt Readings from Last 10 Encounters:   11/03/22 77.1 kg (170 lb)   10/12/22 75 kg (165 lb 4.8 oz)   09/28/22 73.9 kg (163 lb)   09/28/22 74.3 kg (163 lb 14.4 oz)   09/26/22 73.8 kg (162 lb 12.8 oz)   09/11/22 83.2 kg (183 lb 8 oz)   09/05/22 76 kg (167 lb 9.6 oz)   08/29/22 76 kg (167 lb 9.6 oz)   08/20/22 77.6 kg (171 lb)   07/28/22 75.3 kg (166 lb)     General: Well-appearing female in no acute distress.  Eyes: EOMI, PERRL. No scleral icterus.  ENT: Oral mucosa is moist without lesions or thrush.   Cardiovascular: RRR No murmurs, gallops, or rubs. No peripheral edema.  Respiratory: CTA bilaterally. No wheezes or crackles.  MSK: Contractured right arm and hand 2/2 stroke.  Neurologic: Grossly nonfocal.  Skin: No rashes, petechiae, or bruising noted on exposed skin.    Laboratory Data:   Latest Reference Range & Units 11/17/22 13:15   Sodium 136 - 145 mmol/L 136   Potassium 3.4 - 5.3 mmol/L 3.8   Chloride 98 - 107 mmol/L 102   Carbon Dioxide (CO2) 22 - 29 mmol/L 22   Urea Nitrogen 6.0 - 20.0 mg/dL 5.7 (L)   Creatinine 0.51 - 0.95 mg/dL 0.57   GFR Estimate >60 mL/min/1.73m2 >90   Calcium 8.6 - 10.0 mg/dL 9.6   Anion Gap 7 - 15 mmol/L 12   Albumin 3.5 - 5.2 g/dL 4.4   Protein Total 6.4 - 8.3 g/dL 8.0   Alkaline Phosphatase 35 - 104 U/L 86   ALT 10 - 35 U/L 14   AST 10 - 35 U/L 26   Bilirubin Total <=1.2 mg/dL 1.7 (H)   Glucose 70 - 99 mg/dL 98   WBC 4.0 - 11.0 10e3/uL 16.3 (H)   Hemoglobin 11.7 - 15.7 g/dL 8.0 (L)   Hematocrit 35.0 - 47.0 % 23.2 (L)   Platelet Count 150 - 450 10e3/uL 588 (H)   RBC Count 3.80 - 5.20 10e6/uL 2.86 (L)   MCV 78 - 100 fL 81   MCH 26.5 - 33.0 pg 28.0   MCHC 31.5 - 36.5 g/dL 34.5   RDW 10.0 - 15.0 % 19.4 (H)   % Neutrophils % 75   % Lymphocytes % 13   % Monocytes % 4   % Eosinophils % 6   % Basophils % 1   Absolute Basophils 0.0 - 0.2 10e3/uL 0.2   Absolute Eosinophils 0.0 - 0.7 10e3/uL 0.9 (H)   Absolute  Immature Granulocytes <=0.4 10e3/uL 0.1   Absolute Lymphocytes 0.8 - 5.3 10e3/uL 2.1   Absolute Monocytes 0.0 - 1.3 10e3/uL 0.6   % Immature Granulocytes % 1   Absolute Neutrophils 1.6 - 8.3 10e3/uL 12.4 (H)   Absolute NRBCs 10e3/uL 0.1   NRBCs per 100 WBC <1 /100 0   % Retic 0.5 - 2.0 % 9.9 (H)   Absolute Retic 0.025 - 0.095 10e6/uL 0.283 (H)       Most recent labs reviewed and interpreted by me today.      Assessment and Plan:  1. Sickle Cell HgbSS Disease  2. Recent hospitalization with acute chest (9/2022)  3. Chronic Pain  4. 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 History of CVA  7. Hearing loss      Jennifer has been feeling well since her last hospitalization with only one ED visit last month. She continues to optimize her home pain regimen in an effort to avoid ED visits. Due to the recent seasonal weather change she has found it difficult to continue to wean off of oxycodone but maintains a goal to be off or regular opioids by June 2023. We will keep her infusion center visits limited to twice per week. This schedule has worked well for her for many months.    She would like to resume Desferal infusions and stop deferiprone which seems reasonable. I will confirm this plan with Dr. Duncan. Her repeat Ferriscan performed today showed decreased iron deposition in the liver compared to her last MRI in January 2022.     She is agreeable to receive an influenza vaccine in infusion today.     Plan:  -Continue Hydrea to 3000mg daily to help lessen frequency of sickle cell pain  -Continue Oxycodone at home with Rx of 40 tabs/week. She can self-reduce infusion days/week but continue to keep the cap at 2/week for now  -Infusion center visits limited to two times per week (Mondays and Fridays).Continue diligent home management with current medications, heat, rest, compression, warm baths.   -If unable to manage at home can go to ED but continue to not do IV  narcotics in the emergency room. Ketamine previously added to pain plan in ED  -Discontinue deferiprone and resume Desferal infusions. Ferriscan today shows decreased iron deposition in liver, 31.6 mg/g dry tissue, previously greater than 43.   -Continue aspirin BID  -Influenza vaccine today in infusion  -RTC in 4-6 weeks with Dr. Duncan.     45 minutes spent on the date of the encounter doing chart review, review of test results, interpretation of tests, patient visit and documentation     Patricia Mantilla CNP  [facility] [address] [address]  [phone]

## 2022-11-17 ENCOUNTER — APPOINTMENT (OUTPATIENT)
Dept: LAB | Facility: CLINIC | Age: 22
End: 2022-11-17
Attending: OPHTHALMOLOGY
Payer: COMMERCIAL

## 2022-11-17 ENCOUNTER — ONCOLOGY VISIT (OUTPATIENT)
Dept: ONCOLOGY | Facility: CLINIC | Age: 22
End: 2022-11-17
Payer: COMMERCIAL

## 2022-11-17 ENCOUNTER — HOSPITAL ENCOUNTER (OUTPATIENT)
Dept: MRI IMAGING | Facility: CLINIC | Age: 22
Discharge: HOME | End: 2022-11-17
Attending: PEDIATRICS
Payer: COMMERCIAL

## 2022-11-17 ENCOUNTER — INFUSION THERAPY VISIT (OUTPATIENT)
Dept: ONCOLOGY | Facility: CLINIC | Age: 22
End: 2022-11-17
Payer: COMMERCIAL

## 2022-11-17 ENCOUNTER — HOSPITAL ENCOUNTER (OUTPATIENT)
Dept: MRI IMAGING | Facility: CLINIC | Age: 22
Discharge: HOME | End: 2022-11-17
Payer: COMMERCIAL

## 2022-11-17 VITALS
DIASTOLIC BLOOD PRESSURE: 77 MMHG | SYSTOLIC BLOOD PRESSURE: 138 MMHG | TEMPERATURE: 98.2 F | OXYGEN SATURATION: 96 % | HEART RATE: 104 BPM | RESPIRATION RATE: 16 BRPM

## 2022-11-17 DIAGNOSIS — E83.111 IRON OVERLOAD DUE TO REPEATED RED BLOOD CELL TRANSFUSIONS: ICD-10-CM

## 2022-11-17 DIAGNOSIS — D57.1 HB-SS DISEASE WITHOUT CRISIS (H): ICD-10-CM

## 2022-11-17 DIAGNOSIS — D57.00 SICKLE CELL PAIN CRISIS (H): Primary | ICD-10-CM

## 2022-11-17 DIAGNOSIS — E83.111 HEMOCHROMATOSIS DUE TO REPEATED RED BLOOD CELL TRANSFUSIONS: ICD-10-CM

## 2022-11-17 DIAGNOSIS — D57.1 SICKLE CELL DISEASE WITHOUT CRISIS (H): ICD-10-CM

## 2022-11-17 DIAGNOSIS — D57.00 SICKLE CELL PAIN CRISIS (H): ICD-10-CM

## 2022-11-17 DIAGNOSIS — D57.1 HB-SS DISEASE WITHOUT CRISIS (H): Primary | ICD-10-CM

## 2022-11-17 DIAGNOSIS — G81.10 SPASTIC HEMIPLEGIA, UNSPECIFIED ETIOLOGY, UNSPECIFIED LATERALITY (H): ICD-10-CM

## 2022-11-17 LAB
ALBUMIN SERPL BCG-MCNC: 4.4 G/DL (ref 3.5–5.2)
ALP SERPL-CCNC: 86 U/L (ref 35–104)
ALT SERPL W P-5'-P-CCNC: 14 U/L (ref 10–35)
ANION GAP SERPL CALCULATED.3IONS-SCNC: 12 MMOL/L (ref 7–15)
AST SERPL W P-5'-P-CCNC: 26 U/L (ref 10–35)
BASOPHILS # BLD AUTO: 0.2 10E3/UL (ref 0–0.2)
BASOPHILS NFR BLD AUTO: 1 %
BILIRUB SERPL-MCNC: 1.7 MG/DL
BUN SERPL-MCNC: 5.7 MG/DL (ref 6–20)
CALCIUM SERPL-MCNC: 9.6 MG/DL (ref 8.6–10)
CHLORIDE SERPL-SCNC: 102 MMOL/L (ref 98–107)
CREAT SERPL-MCNC: 0.57 MG/DL (ref 0.51–0.95)
DEPRECATED HCO3 PLAS-SCNC: 22 MMOL/L (ref 22–29)
EOSINOPHIL # BLD AUTO: 0.9 10E3/UL (ref 0–0.7)
EOSINOPHIL NFR BLD AUTO: 6 %
ERYTHROCYTE [DISTWIDTH] IN BLOOD BY AUTOMATED COUNT: 19.4 % (ref 10–15)
GFR SERPL CREATININE-BSD FRML MDRD: >90 ML/MIN/1.73M2
GLUCOSE SERPL-MCNC: 98 MG/DL (ref 70–99)
HCT VFR BLD AUTO: 23.2 % (ref 35–47)
HGB BLD-MCNC: 8 G/DL (ref 11.7–15.7)
IMM GRANULOCYTES # BLD: 0.1 10E3/UL
IMM GRANULOCYTES NFR BLD: 1 %
LYMPHOCYTES # BLD AUTO: 2.1 10E3/UL (ref 0.8–5.3)
LYMPHOCYTES NFR BLD AUTO: 13 %
MCH RBC QN AUTO: 28 PG (ref 26.5–33)
MCHC RBC AUTO-ENTMCNC: 34.5 G/DL (ref 31.5–36.5)
MCV RBC AUTO: 81 FL (ref 78–100)
MONOCYTES # BLD AUTO: 0.6 10E3/UL (ref 0–1.3)
MONOCYTES NFR BLD AUTO: 4 %
NEUTROPHILS # BLD AUTO: 12.4 10E3/UL (ref 1.6–8.3)
NEUTROPHILS NFR BLD AUTO: 75 %
NRBC # BLD AUTO: 0.1 10E3/UL
NRBC BLD AUTO-RTO: 0 /100
PLATELET # BLD AUTO: 588 10E3/UL (ref 150–450)
POTASSIUM SERPL-SCNC: 3.8 MMOL/L (ref 3.4–5.3)
PROT SERPL-MCNC: 8 G/DL (ref 6.4–8.3)
RBC # BLD AUTO: 2.86 10E6/UL (ref 3.8–5.2)
RETICS # AUTO: 0.28 10E6/UL (ref 0.03–0.1)
RETICS/RBC NFR AUTO: 9.9 % (ref 0.5–2)
SODIUM SERPL-SCNC: 136 MMOL/L (ref 136–145)
WBC # BLD AUTO: 16.3 10E3/UL (ref 4–11)

## 2022-11-17 PROCEDURE — 74181 MRI ABDOMEN W/O CONTRAST: CPT

## 2022-11-17 PROCEDURE — 75557 CARDIAC MRI FOR MORPH: CPT | Mod: 26 | Performed by: INTERNAL MEDICINE

## 2022-11-17 PROCEDURE — 36592 COLLECT BLOOD FROM PICC: CPT

## 2022-11-17 PROCEDURE — 258N000003 HC RX IP 258 OP 636: Performed by: PEDIATRICS

## 2022-11-17 PROCEDURE — 85045 AUTOMATED RETICULOCYTE COUNT: CPT

## 2022-11-17 PROCEDURE — 75557 CARDIAC MRI FOR MORPH: CPT

## 2022-11-17 PROCEDURE — 250N000011 HC RX IP 250 OP 636: Performed by: REGISTERED NURSE

## 2022-11-17 PROCEDURE — 96374 THER/PROPH/DIAG INJ IV PUSH: CPT

## 2022-11-17 PROCEDURE — 80053 COMPREHEN METABOLIC PANEL: CPT

## 2022-11-17 PROCEDURE — 250N000011 HC RX IP 250 OP 636: Performed by: PEDIATRICS

## 2022-11-17 PROCEDURE — 96376 TX/PRO/DX INJ SAME DRUG ADON: CPT

## 2022-11-17 PROCEDURE — 99215 OFFICE O/P EST HI 40 MIN: CPT | Performed by: REGISTERED NURSE

## 2022-11-17 PROCEDURE — 74181 MRI ABDOMEN W/O CONTRAST: CPT | Mod: 26 | Performed by: RADIOLOGY

## 2022-11-17 PROCEDURE — G0463 HOSPITAL OUTPT CLINIC VISIT: HCPCS

## 2022-11-17 PROCEDURE — G0008 ADMIN INFLUENZA VIRUS VAC: HCPCS | Performed by: REGISTERED NURSE

## 2022-11-17 PROCEDURE — 85025 COMPLETE CBC W/AUTO DIFF WBC: CPT

## 2022-11-17 PROCEDURE — 96361 HYDRATE IV INFUSION ADD-ON: CPT

## 2022-11-17 PROCEDURE — 90686 IIV4 VACC NO PRSV 0.5 ML IM: CPT | Performed by: REGISTERED NURSE

## 2022-11-17 RX ORDER — ONDANSETRON 8 MG/1
8 TABLET, FILM COATED ORAL
Status: CANCELLED
Start: 2023-01-01

## 2022-11-17 RX ORDER — HEPARIN SODIUM (PORCINE) LOCK FLUSH IV SOLN 100 UNIT/ML 100 UNIT/ML
5 SOLUTION INTRAVENOUS
Status: DISCONTINUED | OUTPATIENT
Start: 2022-11-17 | End: 2022-11-17 | Stop reason: HOSPADM

## 2022-11-17 RX ORDER — MORPHINE SULFATE 2 MG/ML
2 INJECTION, SOLUTION INTRAMUSCULAR; INTRAVENOUS
Status: CANCELLED
Start: 2023-01-01

## 2022-11-17 RX ORDER — ONDANSETRON 8 MG/1
8 TABLET, ORALLY DISINTEGRATING ORAL
Status: DISCONTINUED | OUTPATIENT
Start: 2022-11-17 | End: 2022-11-17 | Stop reason: HOSPADM

## 2022-11-17 RX ORDER — DIPHENHYDRAMINE HCL 25 MG
25 CAPSULE ORAL
Status: DISCONTINUED | OUTPATIENT
Start: 2022-11-17 | End: 2022-11-17 | Stop reason: HOSPADM

## 2022-11-17 RX ORDER — HEPARIN SODIUM,PORCINE 10 UNIT/ML
5 VIAL (ML) INTRAVENOUS
Status: CANCELLED | OUTPATIENT
Start: 2023-01-01

## 2022-11-17 RX ORDER — OXYCODONE HYDROCHLORIDE 15 MG/1
15 TABLET ORAL EVERY 4 HOURS PRN
Qty: 40 TABLET | Refills: 0 | Status: SHIPPED | OUTPATIENT
Start: 2022-11-17 | End: 2022-11-25

## 2022-11-17 RX ORDER — HEPARIN SODIUM (PORCINE) LOCK FLUSH IV SOLN 100 UNIT/ML 100 UNIT/ML
5 SOLUTION INTRAVENOUS ONCE
Status: COMPLETED | OUTPATIENT
Start: 2022-11-17 | End: 2022-11-17

## 2022-11-17 RX ORDER — MORPHINE SULFATE 2 MG/ML
2 INJECTION, SOLUTION INTRAMUSCULAR; INTRAVENOUS
Status: COMPLETED | OUTPATIENT
Start: 2022-11-17 | End: 2022-11-17

## 2022-11-17 RX ORDER — HEPARIN SODIUM,PORCINE 10 UNIT/ML
5 VIAL (ML) INTRAVENOUS
Status: DISCONTINUED | OUTPATIENT
Start: 2022-11-17 | End: 2022-11-17 | Stop reason: HOSPADM

## 2022-11-17 RX ORDER — HEPARIN SODIUM (PORCINE) LOCK FLUSH IV SOLN 100 UNIT/ML 100 UNIT/ML
5 SOLUTION INTRAVENOUS
Status: CANCELLED | OUTPATIENT
Start: 2023-01-01

## 2022-11-17 RX ORDER — DIPHENHYDRAMINE HCL 25 MG
25 CAPSULE ORAL
Status: CANCELLED
Start: 2023-01-01

## 2022-11-17 RX ADMIN — INFLUENZA A VIRUS A/VICTORIA/2570/2019 IVR-215 (H1N1) ANTIGEN (FORMALDEHYDE INACTIVATED), INFLUENZA A VIRUS A/DARWIN/9/2021 SAN-010 (H3N2) ANTIGEN (FORMALDEHYDE INACTIVATED), INFLUENZA B VIRUS B/PHUKET/3073/2013 ANTIGEN (FORMALDEHYDE INACTIVATED), AND INFLUENZA B VIRUS B/MICHIGAN/01/2021 ANTIGEN (FORMALDEHYDE INACTIVATED) 0.5 ML: 15; 15; 15; 15 INJECTION, SUSPENSION INTRAMUSCULAR at 16:33

## 2022-11-17 RX ADMIN — MORPHINE SULFATE 2 MG: 2 INJECTION, SOLUTION INTRAMUSCULAR; INTRAVENOUS at 14:36

## 2022-11-17 RX ADMIN — MORPHINE SULFATE 2 MG: 2 INJECTION, SOLUTION INTRAMUSCULAR; INTRAVENOUS at 16:22

## 2022-11-17 RX ADMIN — Medication 5 ML: at 14:39

## 2022-11-17 RX ADMIN — Medication 5 ML: at 13:15

## 2022-11-17 RX ADMIN — MORPHINE SULFATE 2 MG: 2 INJECTION, SOLUTION INTRAMUSCULAR; INTRAVENOUS at 15:18

## 2022-11-17 RX ADMIN — DEXTROSE AND SODIUM CHLORIDE 1000 ML: 5; 450 INJECTION, SOLUTION INTRAVENOUS at 14:33

## 2022-11-17 RX ADMIN — Medication 5 ML: at 16:22

## 2022-11-17 ASSESSMENT — PAIN SCALES - GENERAL: PAINLEVEL: EXTREME PAIN (8)

## 2022-11-17 NOTE — NURSING NOTE
"Chief Complaint   Patient presents with     Port Draw     Port accessed and labs drawn by rn in lab. Vital signs taken.     Port accessed by RN in lab with 20g 3/4\" power needle, labs drawn, port flushed with saline and heparin, vitals checked, pt checked in for next appointment.    Staci Napoles RN      "

## 2022-11-17 NOTE — PROGRESS NOTES
Infusion Nursing Note:  Jennifer Cervantes presents today for IVF-Pain Meds .    Patient seen by provider today: Yes: Patricia Mantilla NP   present during visit today: Not Applicable.    Note: Pt saw provider prior to infusion, ok for infusion.    5/10 after IVF/pain medications x 3 administered.   Pt feels ok to discharge home.    Pt given flu shot without issue to Left deltoid.     Intravenous Access:  Implanted Port.    Treatment Conditions:  Lab Results   Component Value Date    HGB 8.0 (L) 11/17/2022    WBC 16.3 (H) 11/17/2022    ANEU 10.2 (H) 06/26/2022    ANEUTAUTO 12.4 (H) 11/17/2022     (H) 11/17/2022      Lab Results   Component Value Date     11/17/2022    POTASSIUM 3.8 11/17/2022    MAG 2.0 11/11/2021    CR 0.57 11/17/2022    MICAH 9.6 11/17/2022    BILITOTAL 1.7 (H) 11/17/2022    ALBUMIN 4.4 11/17/2022    ALT 14 11/17/2022    AST 26 11/17/2022         Post Infusion Assessment:  Patient tolerated infusion without incident.  Blood return noted pre and post infusion.  Site patent and intact, free from redness, edema or discomfort.  No evidence of extravasations.  Access discontinued per protocol.       Discharge Plan:   Prescription refills given for Oxycodone.  Discharge instructions reviewed with: Patient.  Patient and/or family verbalized understanding of discharge instructions and all questions answered.  AVS to patient via [employer].  Patient will call Triage for appts as needed.   Patient discharged in stable condition accompanied by: self.  Departure Mode: Ambulatory.    Patricia Coronado RN

## 2022-11-17 NOTE — NURSING NOTE
"Oncology Rooming Note    November 17, 2022 1:34 PM   Jennifer Cervantes is a 22 year old female who presents for:    Chief Complaint   Patient presents with     Port Draw     Port accessed and labs drawn by rn in lab. Vital signs taken.     Oncology Clinic Visit     Sickle Cell Anemia     Initial Vitals: /77 (BP Location: Left arm, Patient Position: Sitting, Cuff Size: Adult Regular)   Pulse 104   Temp 98.2  F (36.8  C) (Oral)   Resp 16   SpO2 96%  Estimated body mass index is 29.18 kg/m  as calculated from the following:    Height as of 11/3/22: 1.626 m (5' 4\").    Weight as of 11/3/22: 77.1 kg (170 lb). There is no height or weight on file to calculate BSA.  Extreme Pain (8) Comment: Data Unavailable   No LMP recorded. Patient has had an injection.  Allergies reviewed: Yes  Medications reviewed: Yes    Medications: Pt is requesting a refill of Oxycododne  Pharmacy name entered into [facility]: [place], [place] - [address] [place] [address] [phone]    Clinical concerns: Pt presents today for f/u.       Stacey Murphy LPN  11/17/2022              "

## 2022-11-17 NOTE — PROGRESS NOTES
This is a recent snapshot of the patient's [facility] medical record.  For current drug dose and complete information and questions, call [phone] or In Basket pool, fv home infusion ([id number])  CSN Number:  [id number]

## 2022-11-21 ENCOUNTER — PATIENT OUTREACH (OUTPATIENT)
Dept: CARE COORDINATION | Facility: CLINIC | Age: 22
End: 2022-11-21

## 2022-11-21 ENCOUNTER — INFUSION THERAPY VISIT (OUTPATIENT)
Dept: INFUSION THERAPY | Facility: CLINIC | Age: 22
End: 2022-11-21
Attending: PEDIATRICS
Payer: COMMERCIAL

## 2022-11-21 ENCOUNTER — TELEPHONE (OUTPATIENT)
Dept: ONCOLOGY | Facility: CLINIC | Age: 22
End: 2022-11-21

## 2022-11-21 VITALS
TEMPERATURE: 98.2 F | DIASTOLIC BLOOD PRESSURE: 71 MMHG | HEART RATE: 84 BPM | OXYGEN SATURATION: 96 % | RESPIRATION RATE: 14 BRPM | SYSTOLIC BLOOD PRESSURE: 110 MMHG

## 2022-11-21 DIAGNOSIS — G81.10 SPASTIC HEMIPLEGIA, UNSPECIFIED ETIOLOGY, UNSPECIFIED LATERALITY (H): Primary | ICD-10-CM

## 2022-11-21 DIAGNOSIS — D57.00 SICKLE CELL PAIN CRISIS (H): ICD-10-CM

## 2022-11-21 PROCEDURE — 96361 HYDRATE IV INFUSION ADD-ON: CPT

## 2022-11-21 PROCEDURE — 96374 THER/PROPH/DIAG INJ IV PUSH: CPT

## 2022-11-21 PROCEDURE — 250N000011 HC RX IP 250 OP 636: Performed by: REGISTERED NURSE

## 2022-11-21 PROCEDURE — 258N000003 HC RX IP 258 OP 636: Performed by: PEDIATRICS

## 2022-11-21 PROCEDURE — 250N000011 HC RX IP 250 OP 636: Performed by: PEDIATRICS

## 2022-11-21 PROCEDURE — 96376 TX/PRO/DX INJ SAME DRUG ADON: CPT

## 2022-11-21 RX ORDER — HEPARIN SODIUM,PORCINE 10 UNIT/ML
5 VIAL (ML) INTRAVENOUS
Status: CANCELLED | OUTPATIENT
Start: 2023-01-01

## 2022-11-21 RX ORDER — ONDANSETRON 8 MG/1
8 TABLET, FILM COATED ORAL
Status: CANCELLED
Start: 2023-01-01

## 2022-11-21 RX ORDER — HEPARIN SODIUM (PORCINE) LOCK FLUSH IV SOLN 100 UNIT/ML 100 UNIT/ML
5 SOLUTION INTRAVENOUS
Status: DISCONTINUED | OUTPATIENT
Start: 2022-11-21 | End: 2022-11-21 | Stop reason: HOSPADM

## 2022-11-21 RX ORDER — MORPHINE SULFATE 2 MG/ML
2 INJECTION, SOLUTION INTRAMUSCULAR; INTRAVENOUS
Status: DISCONTINUED | OUTPATIENT
Start: 2022-11-21 | End: 2022-11-21 | Stop reason: HOSPADM

## 2022-11-21 RX ORDER — MORPHINE SULFATE 2 MG/ML
2 INJECTION, SOLUTION INTRAMUSCULAR; INTRAVENOUS
Status: CANCELLED
Start: 2023-01-01

## 2022-11-21 RX ORDER — HEPARIN SODIUM (PORCINE) LOCK FLUSH IV SOLN 100 UNIT/ML 100 UNIT/ML
5 SOLUTION INTRAVENOUS
Status: CANCELLED | OUTPATIENT
Start: 2023-01-01

## 2022-11-21 RX ORDER — DIPHENHYDRAMINE HCL 25 MG
25 CAPSULE ORAL
Status: CANCELLED
Start: 2023-01-01

## 2022-11-21 RX ADMIN — MORPHINE SULFATE 2 MG: 2 INJECTION, SOLUTION INTRAMUSCULAR; INTRAVENOUS at 12:23

## 2022-11-21 RX ADMIN — MORPHINE SULFATE 2 MG: 2 INJECTION, SOLUTION INTRAMUSCULAR; INTRAVENOUS at 10:14

## 2022-11-21 RX ADMIN — DEXTROSE AND SODIUM CHLORIDE 1000 ML: 5; 450 INJECTION, SOLUTION INTRAVENOUS at 10:12

## 2022-11-21 RX ADMIN — Medication 5 ML: at 12:25

## 2022-11-21 RX ADMIN — MORPHINE SULFATE 2 MG: 2 INJECTION, SOLUTION INTRAMUSCULAR; INTRAVENOUS at 11:26

## 2022-11-21 ASSESSMENT — PAIN SCALES - GENERAL: PAINLEVEL: EXTREME PAIN (9)

## 2022-11-21 NOTE — PROGRESS NOTES
Social Work Note: Transportation  Oncology Clinic     Data/Intervention:  Patient Name: Jennifer Cervantes  /Age: 2000, 22 years old    Call From: [facility]        Reason for Call: Transportation     Assessment:   called [employer] ([phone]) to arrange ride. [employer] arranged  for patient from home with a will call ride.  Patient will need to call when ready for return ride home ([phone]).      Plan:  Patient is aware of the transportation plan.  available to assist with any other needs.      CARLOS Chavez,[facility]  Hematology/Oncology Social Worker  Phone:[phone] Pager: [phone]

## 2022-11-21 NOTE — LETTER
11/21/2022         RE: Jennifer Cervantes  [address]  [place] [address]        Dear Colleague,    Thank you for referring your patient, Jennifer Cervantes, to the [facility]. Please see a copy of my visit note below.    Infusion Nursing Note:  Jennifer Cervantes presents today for IVF/pain meds.    Patient seen by provider today: No   present during visit today: Not Applicable.    Note:   1L D5 1/2 NS infused over 2 hours.   Morphine given IVP x3 doses.     Intravenous Access:  Implanted Port.    Treatment Conditions:  Not Applicable.    Administrations This Visit     dextrose 5% and 0.45% NaCl BOLUS     Admin Date  11/21/2022 Action  New Bag Dose  1,000 mL Rate  500 mL/hr Route  Intravenous Administered By  Eulalia Rashid, JOE          heparin 100 UNIT/ML injection 5 mL     Admin Date  11/21/2022 Action  Given Dose  5 mL Route  Intracatheter Administered By  Eulalia Rashid RN          morphine (PF) injection 2 mg     Admin Date  11/21/2022 Action  Given Dose  2 mg Route  Intravenous Administered By  Eulalia Rashid RN           Admin Date  11/21/2022 Action  Given Dose  2 mg Route  Intravenous Administered By  Eulalia Rashid RN           Admin Date  11/21/2022 Action  Given Dose  2 mg Route  Intravenous Administered By  Eulalia Rashid, JOE              /71   Pulse 84   Temp 98.2  F (36.8  C) (Oral)   Resp 14   SpO2 96%     Post Infusion Assessment:  Patient tolerated infusion without incident.  Site patent and intact, free from redness, edema or discomfort.  No evidence of extravasations.  Access discontinued per protocol.     Discharge Plan:   Discharge instructions reviewed with: Patient.  Patient and/or family verbalized understanding of discharge instructions and all questions answered.  AVS to patient via [employer].   Patient discharged in stable condition accompanied by: self.  Departure Mode: Ambulatory.    Eulalia Rashid  RN                        Again, thank you for allowing me to participate in the care of your patient.        Sincerely,        No name on file

## 2022-11-21 NOTE — PATIENT INSTRUCTIONS
Dear Jennifer Cervantes    Thank you for choosing [facility] ([facility]) for your infusion.  The following information is a summary of our appointment as well as important reminders.      We look forward in seeing you on your next appointment here at [facility] ([facility]).  Please don t hesitate to call us at [phone] to reschedule any of your appointments or to speak with one of the [facility] registered nurses.  It was a pleasure taking care of you today.    Sincerely,    [facility]  [address] [place] [address]  [address]  Phone:  [phone]

## 2022-11-21 NOTE — PROGRESS NOTES
Infusion Nursing Note:  Jennifer Cervantes presents today for IVF/pain meds.    Patient seen by provider today: No   present during visit today: Not Applicable.    Note:   1L D5 1/2 NS infused over 2 hours.   Morphine given IVP x3 doses.     Intravenous Access:  Implanted Port.    Treatment Conditions:  Not Applicable.    Administrations This Visit     dextrose 5% and 0.45% NaCl BOLUS     Admin Date  11/21/2022 Action  New Bag Dose  1,000 mL Rate  500 mL/hr Route  Intravenous Administered By  Eulalia Rashid RN          heparin 100 UNIT/ML injection 5 mL     Admin Date  11/21/2022 Action  Given Dose  5 mL Route  Intracatheter Administered By  Eulalia Rashid RN          morphine (PF) injection 2 mg     Admin Date  11/21/2022 Action  Given Dose  2 mg Route  Intravenous Administered By  Eulalia Rashid RN           Admin Date  11/21/2022 Action  Given Dose  2 mg Route  Intravenous Administered By  Eulalia Rashid RN           Admin Date  11/21/2022 Action  Given Dose  2 mg Route  Intravenous Administered By  Eulalia Rashid RN              /71   Pulse 84   Temp 98.2  F (36.8  C) (Oral)   Resp 14   SpO2 96%     Post Infusion Assessment:  Patient tolerated infusion without incident.  Site patent and intact, free from redness, edema or discomfort.  No evidence of extravasations.  Access discontinued per protocol.     Discharge Plan:   Discharge instructions reviewed with: Patient.  Patient and/or family verbalized understanding of discharge instructions and all questions answered.  AVS to patient via [employer].   Patient discharged in stable condition accompanied by: self.  Departure Mode: Ambulatory.    Eulalia Rashid RN

## 2022-11-21 NOTE — TELEPHONE ENCOUNTER
Pt called in to triage requesting IVF pain meds for sickle cell pain in her lower back rated 9/10 x 2 days. Stated last took prn oxycodone at 0600 this morning without relief. Denied any fevers, chills, cough, sob, chest pain, numbness or tingling or other symptoms not typical of sickle cell pain.   Pt's last infusion was 11/17/2022, last clinic visit 10/17/2022 with follow-up not currently scheduled.    Patient states they do not have own ride and it will take 60 minutes to get to cancer clinic.   Pt denied being out of home medications and needing any refills today.   Pt qualifies for sickle cell standing order protocol.  If you do not hear from the infusion center by 2pm then you will not be able to get in for an infusion today.   Please note, if you are late for your appt, you risk losing your infusion appt as it may delay another patient's infusion who arrived on time.     Message to infusion.     They are able to see her at 10am.  Call to patient and she is aware.  Messages to schedule and SW for assistance for a ride.

## 2022-11-25 ENCOUNTER — PATIENT OUTREACH (OUTPATIENT)
Dept: ONCOLOGY | Facility: CLINIC | Age: 22
End: 2022-11-25

## 2022-11-25 ENCOUNTER — NURSE TRIAGE (OUTPATIENT)
Dept: ONCOLOGY | Facility: CLINIC | Age: 22
End: 2022-11-25

## 2022-11-25 ENCOUNTER — PATIENT OUTREACH (OUTPATIENT)
Dept: CARE COORDINATION | Facility: CLINIC | Age: 22
End: 2022-11-25

## 2022-11-25 ENCOUNTER — INFUSION THERAPY VISIT (OUTPATIENT)
Dept: INFUSION THERAPY | Facility: CLINIC | Age: 22
End: 2022-11-25
Attending: PEDIATRICS
Payer: COMMERCIAL

## 2022-11-25 VITALS
TEMPERATURE: 98.1 F | OXYGEN SATURATION: 95 % | DIASTOLIC BLOOD PRESSURE: 78 MMHG | RESPIRATION RATE: 16 BRPM | HEART RATE: 92 BPM | SYSTOLIC BLOOD PRESSURE: 127 MMHG

## 2022-11-25 DIAGNOSIS — G81.10 SPASTIC HEMIPLEGIA, UNSPECIFIED ETIOLOGY, UNSPECIFIED LATERALITY (H): Primary | ICD-10-CM

## 2022-11-25 DIAGNOSIS — D57.00 SICKLE CELL PAIN CRISIS (H): ICD-10-CM

## 2022-11-25 PROCEDURE — 96376 TX/PRO/DX INJ SAME DRUG ADON: CPT

## 2022-11-25 PROCEDURE — 250N000011 HC RX IP 250 OP 636: Performed by: PEDIATRICS

## 2022-11-25 PROCEDURE — 258N000003 HC RX IP 258 OP 636: Performed by: PEDIATRICS

## 2022-11-25 PROCEDURE — 96361 HYDRATE IV INFUSION ADD-ON: CPT

## 2022-11-25 PROCEDURE — 250N000011 HC RX IP 250 OP 636: Performed by: REGISTERED NURSE

## 2022-11-25 PROCEDURE — 96374 THER/PROPH/DIAG INJ IV PUSH: CPT

## 2022-11-25 RX ORDER — OXYCODONE HYDROCHLORIDE 15 MG/1
15 TABLET ORAL EVERY 4 HOURS PRN
Qty: 40 TABLET | Refills: 0 | Status: CANCELLED | OUTPATIENT
Start: 2022-11-25

## 2022-11-25 RX ORDER — ONDANSETRON 8 MG/1
8 TABLET, FILM COATED ORAL
Status: CANCELLED
Start: 2023-01-01

## 2022-11-25 RX ORDER — HEPARIN SODIUM (PORCINE) LOCK FLUSH IV SOLN 100 UNIT/ML 100 UNIT/ML
5 SOLUTION INTRAVENOUS
Status: CANCELLED | OUTPATIENT
Start: 2023-01-01

## 2022-11-25 RX ORDER — DIPHENHYDRAMINE HCL 25 MG
25 CAPSULE ORAL
Status: CANCELLED
Start: 2023-01-01

## 2022-11-25 RX ORDER — ONDANSETRON 8 MG/1
8 TABLET, ORALLY DISINTEGRATING ORAL ONCE
Status: DISCONTINUED | OUTPATIENT
Start: 2022-11-25 | End: 2022-11-25 | Stop reason: HOSPADM

## 2022-11-25 RX ORDER — MORPHINE SULFATE 2 MG/ML
2 INJECTION, SOLUTION INTRAMUSCULAR; INTRAVENOUS
Status: DISCONTINUED | OUTPATIENT
Start: 2022-11-25 | End: 2022-11-25 | Stop reason: HOSPADM

## 2022-11-25 RX ORDER — MORPHINE SULFATE 2 MG/ML
2 INJECTION, SOLUTION INTRAMUSCULAR; INTRAVENOUS
Status: CANCELLED
Start: 2023-01-01

## 2022-11-25 RX ORDER — DIPHENHYDRAMINE HCL 25 MG
25 CAPSULE ORAL
Status: DISCONTINUED | OUTPATIENT
Start: 2022-11-25 | End: 2022-11-25 | Stop reason: HOSPADM

## 2022-11-25 RX ORDER — ONDANSETRON 8 MG/1
8 TABLET, FILM COATED ORAL
Status: DISCONTINUED | OUTPATIENT
Start: 2022-11-25 | End: 2022-11-25

## 2022-11-25 RX ORDER — OXYCODONE HYDROCHLORIDE 15 MG/1
15 TABLET ORAL EVERY 4 HOURS PRN
Qty: 40 TABLET | Refills: 0 | Status: SHIPPED | OUTPATIENT
Start: 2022-11-25 | End: 2022-12-01

## 2022-11-25 RX ORDER — HEPARIN SODIUM,PORCINE 10 UNIT/ML
5 VIAL (ML) INTRAVENOUS
Status: CANCELLED | OUTPATIENT
Start: 2023-01-01

## 2022-11-25 RX ORDER — HEPARIN SODIUM,PORCINE 10 UNIT/ML
5 VIAL (ML) INTRAVENOUS
Status: DISCONTINUED | OUTPATIENT
Start: 2022-11-25 | End: 2022-11-25 | Stop reason: HOSPADM

## 2022-11-25 RX ADMIN — MORPHINE SULFATE 2 MG: 2 INJECTION, SOLUTION INTRAMUSCULAR; INTRAVENOUS at 14:45

## 2022-11-25 RX ADMIN — Medication 5 ML: at 15:12

## 2022-11-25 RX ADMIN — DEXTROSE AND SODIUM CHLORIDE 1000 ML: 5; 450 INJECTION, SOLUTION INTRAVENOUS at 12:46

## 2022-11-25 RX ADMIN — MORPHINE SULFATE 2 MG: 2 INJECTION, SOLUTION INTRAMUSCULAR; INTRAVENOUS at 13:45

## 2022-11-25 RX ADMIN — MORPHINE SULFATE 2 MG: 2 INJECTION, SOLUTION INTRAMUSCULAR; INTRAVENOUS at 12:47

## 2022-11-25 NOTE — PROGRESS NOTES
[facility]: Physical Medicine and Rehabilitation                                                                                   Jennifer never received a call after order was placed twice to orthotics for a elbow/ arm brace.    Faxed order and face sheet today to [phone]    Jennifer will call me if she has not heard from them in one week         Mandie Graham LPN  Oncology PM&R Care Coordination  [phone]

## 2022-11-25 NOTE — NURSING NOTE
Chief Complaint   Patient presents with     Port Draw     Labs drawn via PORT by RN in lab. VS taken.      Kaveh Ellsworth RN     show

## 2022-11-25 NOTE — PATIENT INSTRUCTIONS
Dear Jennifer Cervantes    Thank you for choosing [facility] ([facility]) for your infusion.  The following information is a summary of our appointment as well as important reminders.      We look forward in seeing you on your next appointment here at [facility] ([facility]).  Please don t hesitate to call us at [phone] to reschedule any of your appointments or to speak with one of the [facility] registered nurses.  It was a pleasure taking care of you today.    Sincerely,    [facility]  [facility]  [address] [place] [address]  [address]  Phone:  [phone]

## 2022-11-25 NOTE — PROGRESS NOTES
Nursing Note  Jennifer Cervantes presents today [facility] for:   Chief Complaint   Patient presents with     Infusion     IVF, pain meds     During today's [facility] appointment, orders from Dr. Duncan were completed.  Frequency: :PRN    Progress note:  Patient identification verified by name and date of birth.  Assessment completed.  Vitals recorded in Doc Flowsheets.  Patient was provided with education regarding medication/procedure and possible side effects.  Patient verbalized understanding.     present during visit today: Not Applicable.    Treatment Conditions: Non-applicable.    Premedications: were not ordered; pt declined zofran and benadryl today    Drug Waste Record: No    Infusion length and rate:  infusion given over approximately 2 hours    Labs: were not ordered for this appointment.    Vascular access: patient arrived to [facility] with port accessed (accessed per home infusion on 11/22 for home infusions)    Post Infusion Assessment:  Patient tolerated infusion without incident. Pain down from 9/10 upon [facility] arrival to 4/10 and tolerable upon discharge.     Discharge Plan:   Follow up plan of care with: ongoing infusions at [facility]., ordering provider as scheduled. and after visit summary given to patient  Discharge instructions were reviewed with patient.  Patient/representative verbalized understanding of discharge instructions and all questions answered.  Patient discharged from [facility] in stable condition.    Malgorzata Holcomb RN    Administrations This Visit     dextrose 5% and 0.45% NaCl BOLUS     Admin Date  11/25/2022 Action  New Bag Dose  1,000 mL Rate  500 mL/hr Route  Intravenous Administered By  Malgorzata Holcomb, RN          heparin lock flush 10 UNIT/ML injection 5 mL     Admin Date  11/25/2022 Action  Given Dose  5 mL Route  Intracatheter Administered  By  Malgorzata Holcomb, RN          morphine (PF) injection 2 mg     Admin Date  11/25/2022 Action  Given Dose  2 mg Route  Intravenous Administered By  Malgorzata Holcomb RN           Admin Date  11/25/2022 Action  Given Dose  2 mg Route  Intravenous Administered By  Malgorzata Holcomb RN           Admin Date  11/25/2022 Action  Given Dose  2 mg Route  Intravenous Administered By  Malgorzata Holcomb, RN                /78   Pulse 92   Temp 98.1  F (36.7  C) (Oral)   Resp 16   SpO2 95%

## 2022-11-25 NOTE — PROGRESS NOTES
Social Work Note: Telephone Call  Oncology Clinic     Data/Intervention:  Patient Name:  Jennifer Cervantes  /Age:  2000 (22 year old)     Call From:  Patient called triage phone number         Reason for Call:  Transportation needed for 1230h infusion today     Assessment:   called [facility] [employer] ([phone]) to arrange ride through patient's insurance. [facility] [employer] arranged  for patient from home with [employer].  Patient will need to call when ready for return ride home ([phone]).      Plan:  Patient is aware of the transportation plan.  available to assist with any other needs.      Cinthia Krueger [facility]  Outpatient Specialty Clinics  Direct Phone: [phone]

## 2022-11-25 NOTE — TELEPHONE ENCOUNTER
[facility] Triage    Refill Request    Date of most recent appointment: 11/17/22 Jose Rafael  Next upcoming appointment:   1/2/23 Perla    Medication requested:  Oxy  Quantity:  40  Last fill date: 11/17  Person requesting refill:  Jennifer  Notes: She will be out of medications Saturday, she does not know how many she has left, states 1 every 4 hours max of 6 a day is how she is taking the RX    Prescribing provider(s):  jose rafael    Routing to Dr. Duncan

## 2022-11-25 NOTE — LETTER
Date:November 26, 2022      Provider requested that no letter be sent. Do not send.       [facility]

## 2022-11-25 NOTE — LETTER
11/25/2022         RE: Jennifer Cervantes  [address]  [place] [address]        Dear Colleague,    Thank you for referring your patient, Jennifer Cervantes, to the [facility]. Please see a copy of my visit note below.    Nursing Note  Jennifer Cervantes presents [facility] for:   Chief Complaint   Patient presents with     Infusion     IVF, pain meds     During today's [facility] appointment, orders from Dr. Duncan were completed.  Frequency: :PRN    Progress note:  Patient identification verified by name and date of birth.  Assessment completed.  Vitals recorded in Doc Flowsheets.  Patient was provided with education regarding medication/procedure and possible side effects.  Patient verbalized understanding.     present during visit today: Not Applicable.    Treatment Conditions: Non-applicable.    Premedications: were not ordered; pt declined zofran and benadryl today    Drug Waste Record: No    Infusion length and rate:  infusion given over approximately 2 hours    Labs: were not ordered for this appointment.    Vascular access: patient arrived to [facility] with port accessed (accessed per home infusion on 11/22 for home infusions)    Post Infusion Assessment:  Patient tolerated infusion without incident. [facility]10 and tolerable upon discharge.     Discharge Plan:   Follow up plan of care with: ongoing infusions at [facility]., ordering provider as scheduled. and after visit summary given to patient  Discharge instructions were reviewed with patient.  Patient/representative verbalized understanding of discharge instructions and all questions answered.  Patient discharged from [facility] in stable condition.    Malgorzata Glass RN    Administrations This Visit     dextrose 5% and 0.45% NaCl BOLUS     Admin  Date  11/25/2022 Action  New Bag Dose  1,000 mL Rate  500 mL/hr Route  Intravenous Administered By  Malgorzata Holcomb RN          heparin lock flush 10 UNIT/ML injection 5 mL     Admin Date  11/25/2022 Action  Given Dose  5 mL Route  Intracatheter Administered By  Malgorzata Holcomb RN          morphine (PF) injection 2 mg     Admin Date  11/25/2022 Action  Given Dose  2 mg Route  Intravenous Administered By  Malgorzata Holcomb RN           Admin Date  11/25/2022 Action  Given Dose  2 mg Route  Intravenous Administered By  Malgorzata Holcomb RN           Admin Date  11/25/2022 Action  Given Dose  2 mg Route  Intravenous Administered By  Malgorzata Holcomb RN                /78   Pulse 92   Temp 98.1  F (36.7  C) (Oral)   Resp 16   SpO2 95%         Again, thank you for allowing me to participate in the care of your patient.        Sincerely,        No name on file

## 2022-11-25 NOTE — TELEPHONE ENCOUNTER
[facility] Triage    Pt called in to triage requesting IVF pain meds for back and legs pain rated 10/10 x 7 days. Stated last took oxycodone this morning without relief. Denied any fevers, chills, cough, sob, chest pain or other symptoms.  Assess for confusion, numbness, paralysis, vomiting, headache, vision issues, difficulty walking and/or talking. Pt's last infusion was 11/21/22, last clinic visit 11/17/22 Jose Rafael with follow-up on 1/2/23 with Perla. Patient states they do not have own ride and it will take 60 to get to cancer clinic. Pt out of home medications as of Saturday or Sunday and needs refills this weekend.  Refill encounter sent. Pt qualifies for sickle cell standing order protocol.    Placed on waiting list, advised if we do not call by 2 and she continues to have 10/10 she should go to the ED.  Jennifer verbalized understanding.    [facility]    858 am confirmed with Jennifer 1230 appt time and she needs a ride.    SW requested to assist Jennifer and [facility] notified to place [facility] and [facility] updated.

## 2022-11-27 ENCOUNTER — HOSPITAL ENCOUNTER (EMERGENCY)
Facility: CLINIC | Age: 22
Discharge: HOME | End: 2022-11-27
Attending: EMERGENCY MEDICINE | Admitting: EMERGENCY MEDICINE
Payer: COMMERCIAL

## 2022-11-27 ENCOUNTER — APPOINTMENT (OUTPATIENT)
Dept: GENERAL RADIOLOGY | Facility: CLINIC | Age: 22
End: 2022-11-27
Attending: EMERGENCY MEDICINE
Payer: COMMERCIAL

## 2022-11-27 VITALS
HEART RATE: 83 BPM | RESPIRATION RATE: 20 BRPM | DIASTOLIC BLOOD PRESSURE: 60 MMHG | TEMPERATURE: 97.7 F | OXYGEN SATURATION: 94 % | SYSTOLIC BLOOD PRESSURE: 107 MMHG

## 2022-11-27 DIAGNOSIS — R07.9 LEFT-SIDED CHEST PAIN: ICD-10-CM

## 2022-11-27 DIAGNOSIS — R11.2 NAUSEA AND VOMITING IN ADULT: ICD-10-CM

## 2022-11-27 LAB
ALBUMIN SERPL BCG-MCNC: 4.7 G/DL (ref 3.5–5.2)
ALP SERPL-CCNC: 97 U/L (ref 35–104)
ALT SERPL W P-5'-P-CCNC: 21 U/L (ref 10–35)
ANION GAP SERPL CALCULATED.3IONS-SCNC: 17 MMOL/L (ref 7–15)
AST SERPL W P-5'-P-CCNC: 40 U/L (ref 10–35)
ATRIAL RATE - MUSE: 88 BPM
BASOPHILS # BLD AUTO: 0.3 10E3/UL (ref 0–0.2)
BASOPHILS NFR BLD AUTO: 2 %
BILIRUB SERPL-MCNC: 2.9 MG/DL
BUN SERPL-MCNC: 8.5 MG/DL (ref 6–20)
CALCIUM SERPL-MCNC: 9.2 MG/DL (ref 8.6–10)
CHLORIDE SERPL-SCNC: 102 MMOL/L (ref 98–107)
CREAT SERPL-MCNC: 0.56 MG/DL (ref 0.51–0.95)
D DIMER PPP FEU-MCNC: 3.74 UG/ML FEU (ref 0–0.5)
DEPRECATED HCO3 PLAS-SCNC: 17 MMOL/L (ref 22–29)
DIASTOLIC BLOOD PRESSURE - MUSE: NORMAL MMHG
EOSINOPHIL # BLD AUTO: 0.8 10E3/UL (ref 0–0.7)
EOSINOPHIL NFR BLD AUTO: 4 %
ERYTHROCYTE [DISTWIDTH] IN BLOOD BY AUTOMATED COUNT: 23.9 % (ref 10–15)
GFR SERPL CREATININE-BSD FRML MDRD: >90 ML/MIN/1.73M2
GLUCOSE SERPL-MCNC: 118 MG/DL (ref 70–99)
HCT VFR BLD AUTO: 24.8 % (ref 35–47)
HGB BLD-MCNC: 8.3 G/DL (ref 11.7–15.7)
HOLD SPECIMEN: NORMAL
IMM GRANULOCYTES # BLD: 0.1 10E3/UL
IMM GRANULOCYTES NFR BLD: 1 %
INTERPRETATION ECG - MUSE: NORMAL
LIPASE SERPL-CCNC: 19 U/L (ref 13–60)
LYMPHOCYTES # BLD AUTO: 2 10E3/UL (ref 0.8–5.3)
LYMPHOCYTES NFR BLD AUTO: 11 %
MCH RBC QN AUTO: 27 PG (ref 26.5–33)
MCHC RBC AUTO-ENTMCNC: 33.5 G/DL (ref 31.5–36.5)
MCV RBC AUTO: 81 FL (ref 78–100)
MONOCYTES # BLD AUTO: 1 10E3/UL (ref 0–1.3)
MONOCYTES NFR BLD AUTO: 5 %
NEUTROPHILS # BLD AUTO: 14.7 10E3/UL (ref 1.6–8.3)
NEUTROPHILS NFR BLD AUTO: 77 %
NRBC # BLD AUTO: 0.2 10E3/UL
NRBC BLD AUTO-RTO: 1 /100
P AXIS - MUSE: 61 DEGREES
PLATELET # BLD AUTO: 571 10E3/UL (ref 150–450)
POTASSIUM SERPL-SCNC: 3.4 MMOL/L (ref 3.4–5.3)
PR INTERVAL - MUSE: 158 MS
PROT SERPL-MCNC: 8.3 G/DL (ref 6.4–8.3)
QRS DURATION - MUSE: 84 MS
QT - MUSE: 392 MS
QTC - MUSE: 474 MS
R AXIS - MUSE: 23 DEGREES
RBC # BLD AUTO: 3.07 10E6/UL (ref 3.8–5.2)
RETIC HEMOGLOBIN: 32 PG (ref 28.2–35.7)
RETICS # AUTO: 0.5 10E6/UL (ref 0.03–0.1)
RETICS/RBC NFR AUTO: 16.2 % (ref 0.5–2)
SODIUM SERPL-SCNC: 136 MMOL/L (ref 136–145)
SYSTOLIC BLOOD PRESSURE - MUSE: NORMAL MMHG
T AXIS - MUSE: 10 DEGREES
TROPONIN T SERPL HS-MCNC: <6 NG/L
VENTRICULAR RATE- MUSE: 88 BPM
WBC # BLD AUTO: 18.9 10E3/UL (ref 4–11)

## 2022-11-27 PROCEDURE — 93010 ELECTROCARDIOGRAM REPORT: CPT | Performed by: EMERGENCY MEDICINE

## 2022-11-27 PROCEDURE — 71046 X-RAY EXAM CHEST 2 VIEWS: CPT

## 2022-11-27 PROCEDURE — 250N000011 HC RX IP 250 OP 636: Performed by: EMERGENCY MEDICINE

## 2022-11-27 PROCEDURE — 85004 AUTOMATED DIFF WBC COUNT: CPT | Performed by: EMERGENCY MEDICINE

## 2022-11-27 PROCEDURE — 99285 EMERGENCY DEPT VISIT HI MDM: CPT | Mod: 25 | Performed by: EMERGENCY MEDICINE

## 2022-11-27 PROCEDURE — 84484 ASSAY OF TROPONIN QUANT: CPT | Performed by: EMERGENCY MEDICINE

## 2022-11-27 PROCEDURE — 96375 TX/PRO/DX INJ NEW DRUG ADDON: CPT | Performed by: EMERGENCY MEDICINE

## 2022-11-27 PROCEDURE — 80053 COMPREHEN METABOLIC PANEL: CPT | Performed by: EMERGENCY MEDICINE

## 2022-11-27 PROCEDURE — 85379 FIBRIN DEGRADATION QUANT: CPT | Performed by: EMERGENCY MEDICINE

## 2022-11-27 PROCEDURE — 250N000013 HC RX MED GY IP 250 OP 250 PS 637: Performed by: EMERGENCY MEDICINE

## 2022-11-27 PROCEDURE — 71046 X-RAY EXAM CHEST 2 VIEWS: CPT | Mod: 26 | Performed by: RADIOLOGY

## 2022-11-27 PROCEDURE — 96361 HYDRATE IV INFUSION ADD-ON: CPT | Performed by: EMERGENCY MEDICINE

## 2022-11-27 PROCEDURE — 96374 THER/PROPH/DIAG INJ IV PUSH: CPT | Performed by: EMERGENCY MEDICINE

## 2022-11-27 PROCEDURE — 93005 ELECTROCARDIOGRAM TRACING: CPT | Performed by: EMERGENCY MEDICINE

## 2022-11-27 PROCEDURE — 258N000003 HC RX IP 258 OP 636: Performed by: EMERGENCY MEDICINE

## 2022-11-27 PROCEDURE — 36415 COLL VENOUS BLD VENIPUNCTURE: CPT | Performed by: EMERGENCY MEDICINE

## 2022-11-27 PROCEDURE — 83690 ASSAY OF LIPASE: CPT | Performed by: EMERGENCY MEDICINE

## 2022-11-27 PROCEDURE — 85046 RETICYTE/HGB CONCENTRATE: CPT | Performed by: EMERGENCY MEDICINE

## 2022-11-27 RX ORDER — KETOROLAC TROMETHAMINE 15 MG/ML
15 INJECTION, SOLUTION INTRAMUSCULAR; INTRAVENOUS ONCE
Status: COMPLETED | OUTPATIENT
Start: 2022-11-27 | End: 2022-11-27

## 2022-11-27 RX ORDER — HEPARIN SODIUM,PORCINE 10 UNIT/ML
5-10 VIAL (ML) INTRAVENOUS
Status: DISCONTINUED | OUTPATIENT
Start: 2022-11-27 | End: 2022-11-27 | Stop reason: HOSPADM

## 2022-11-27 RX ORDER — HEPARIN SODIUM (PORCINE) LOCK FLUSH IV SOLN 100 UNIT/ML 100 UNIT/ML
5-10 SOLUTION INTRAVENOUS
Status: DISCONTINUED | OUTPATIENT
Start: 2022-11-27 | End: 2022-11-27 | Stop reason: HOSPADM

## 2022-11-27 RX ORDER — ONDANSETRON 2 MG/ML
4 INJECTION INTRAMUSCULAR; INTRAVENOUS ONCE
Status: COMPLETED | OUTPATIENT
Start: 2022-11-27 | End: 2022-11-27

## 2022-11-27 RX ORDER — SODIUM CHLORIDE, SODIUM LACTATE, POTASSIUM CHLORIDE, CALCIUM CHLORIDE 600; 310; 30; 20 MG/100ML; MG/100ML; MG/100ML; MG/100ML
125 INJECTION, SOLUTION INTRAVENOUS CONTINUOUS
Status: DISCONTINUED | OUTPATIENT
Start: 2022-11-27 | End: 2022-11-27 | Stop reason: HOSPADM

## 2022-11-27 RX ADMIN — KETOROLAC TROMETHAMINE 15 MG: 15 INJECTION, SOLUTION INTRAMUSCULAR; INTRAVENOUS at 03:43

## 2022-11-27 RX ADMIN — SODIUM CHLORIDE, POTASSIUM CHLORIDE, SODIUM LACTATE AND CALCIUM CHLORIDE 1000 ML: 600; 310; 30; 20 INJECTION, SOLUTION INTRAVENOUS at 04:27

## 2022-11-27 RX ADMIN — Medication 5 ML: at 05:58

## 2022-11-27 RX ADMIN — ONDANSETRON 4 MG: 2 INJECTION INTRAMUSCULAR; INTRAVENOUS at 03:43

## 2022-11-27 RX ADMIN — OXYCODONE HYDROCHLORIDE 15 MG: 5 TABLET ORAL at 03:44

## 2022-11-27 ASSESSMENT — ENCOUNTER SYMPTOMS
RHINORRHEA: 0
VOMITING: 1
ABDOMINAL PAIN: 0
CONFUSION: 0
FEVER: 0
DYSURIA: 0
WEAKNESS: 0
BRUISES/BLEEDS EASILY: 0
BACK PAIN: 0
NAUSEA: 1
SHORTNESS OF BREATH: 0
HEADACHES: 1

## 2022-11-27 ASSESSMENT — ACTIVITIES OF DAILY LIVING (ADL)
ADLS_ACUITY_SCORE: 35
ADLS_ACUITY_SCORE: 33

## 2022-11-27 NOTE — ED PROVIDER NOTES
ED Provider Note  [facility]      History     Chief Complaint   Patient presents with     Rib Pain     Vomiting     Headache     Right side head pain, left side rib pain when lays on left side, vomiting      HPI  Jennifer Cervantes is a 22 year old female who is well-known to the ED with history of sickle cell disease, cerebral infarction, cognitive development delay, chronic hemiplegia and hemiparesis from prior CVA, migraines, who presents to the ED with complaint of left-sided pain, nausea and vomiting.  Patient reports that over the past 1 week she developed some pain on the left side of her chest wall.  Patient reports pain has been constant for the past week however worsening over the past 2 days.  Patient describes the pain as a sharp pain on the left side with no radiation, no aggravating, no alleviating factors.  Patient reports that she has been trying to take her oxycodone at home with no improvement of her symptoms.  Patient reports about 2 hours prior to arrival she did have 3 episodes of nausea, vomiting, and then developed a headache.  Patient describes the pain as a throbbing sensation in the front of her head.  Patient denies any visual changes, no recent trauma, denies any neck pain, back pain, chest pain, shortness of breath, abdominal pain.  Patient denies any tobacco, alcohol, substance abuse.    Past Medical History  Past Medical History:   Diagnosis Date     Anxiety      Bleeding disorder (H)      Blood clotting disorder (H)      Cerebral infarction (H) 2015     Cognitive developmental delay     low IQ. Please recognize when managing pain and planning with her     Depressive disorder      Hemiplegia and hemiparesis following cerebral infarction affecting right dominant side (H)     right hand contractures     Iron overload due to repeated red blood cell transfusions      Migraines      Multiple subsegmental pulmonary emboli without acute cor pulmonale (H) 02/01/2021      Oppositional defiant behavior      Superficial venous thrombosis of arm, right 03/25/2021     Uncomplicated asthma      Past Surgical History:   Procedure Laterality Date     AS INSERT TUNNELED CV 2 CATH W/O PORT/PUMP       CHOLECYSTECTOMY       CV RIGHT HEART CATH MEASUREMENTS RECORDED N/A 11/18/2021    Procedure: Right Heart Cath;  Surgeon: Jackson Stauffer MD;  Location:  HEART CARDIAC CATH LAB     INSERT PORT VASCULAR ACCESS Left 4/21/2021    Procedure: INSERTION, VASCULAR ACCESS PORT (NOT SURE ON SIDE UNTIL REMOVAL);  Surgeon: Rajan More MD;  Location: [facility] OR     IR CHEST PORT PLACEMENT > 5 YRS OF AGE  4/21/2021     IR CVC NON TUNNEL LINE REMOVAL  5/6/2021     IR CVC NON TUNNEL PLACEMENT > 5 YRS  04/07/2020     IR CVC NON TUNNEL PLACEMENT > 5 YRS  4/30/2021     IR CVC NON TUNNEL PLACEMENT > 5 YRS  9/7/2022     IR PORT REMOVAL LEFT  4/21/2021     REMOVE PORT VASCULAR ACCESS Left 4/21/2021    Procedure: REMOVAL, VASCULAR ACCESS PORT LEFT;  Surgeon: Rajan More MD;  Location: [facility] OR     REPAIR TENDON ELBOW Right 10/02/2019    Procedure: Right Elbow Flexor Lengthening, Flexor Pronator Slide Of Wrist and Finger, Thumb Adductor Lengthening;  Surgeon: Anai Franco MD;  Location: [facility] OR     TONSILLECTOMY Bilateral 10/02/2019    Procedure: Bilateral Tonsillectomy;  Surgeon: Farhana Guy MD;  Location: [facility] OR     ZZC BREAST REDUCTION (INCLUDES LIPO) TIER 3 Bilateral 04/23/2019     acetaminophen (TYLENOL) 325 MG tablet  albuterol (PROVENTIL) (2.5 MG/3ML) 0.083% neb solution  aspirin (ASA) 81 MG chewable tablet  budesonide-formoterol (SYMBICORT) 160-4.5 MCG/ACT Inhaler  deferasirox (JADENU) 360 MG tablet  Deferiprone, Twice Daily, 1000 MG TABS  EPINEPHrine (ANY BX GENERIC EQUIV) 0.3 MG/0.3ML injection 2-pack  Hydroxyurea 1000 MG TABS  ondansetron (ZOFRAN) 8 MG tablet  oxyCODONE IR (ROXICODONE) 15 MG tablet      Allergies   Allergen Reactions     Contrast Dye      Hives and breathing  issues     Fish-Derived Products Hives     Seafood Hives     Diagnostic X-Ray Materials      Gadolinium      Family History  Family History   Problem Relation Age of Onset     Sickle Cell Trait Mother      Hypertension Mother      Asthma Mother      Sickle Cell Trait Father      Glaucoma No family hx of      Macular Degeneration No family hx of      Diabetes No family hx of      Gout No family hx of      Social History   Social History     Tobacco Use     Smoking status: Never     Smokeless tobacco: Never   Substance Use Topics     Alcohol use: Not Currently     Alcohol/week: 0.0 standard drinks     Drug use: Never      Past medical history, past surgical history, medications, allergies, family history, and social history were reviewed with the patient. No additional pertinent items.       Review of Systems   Constitutional: Negative for fever.   HENT: Negative for rhinorrhea.    Eyes: Negative for visual disturbance.   Respiratory: Negative for shortness of breath.    Cardiovascular: Negative for chest pain.        Left side pain (chest/abdomen)   Gastrointestinal: Positive for nausea and vomiting. Negative for abdominal pain.   Endocrine: Negative for polyuria.   Genitourinary: Negative for dysuria.   Musculoskeletal: Negative for back pain.   Skin: Negative for rash.   Allergic/Immunologic: Positive for immunocompromised state.   Neurological: Positive for headaches. Negative for weakness.   Hematological: Does not bruise/bleed easily.   Psychiatric/Behavioral: Negative for confusion.     A complete review of systems was performed with pertinent positives and negatives noted in the HPI, and all other systems negative.    Physical Exam   BP: 129/76  Pulse: 98  Temp: 97.7  F (36.5  C)  Resp: 20  SpO2: 97 %  Physical Exam  General: Afebrile, moderate distress secondary to pain.  HEENT: Normocephalic, atraumatic, conjunctivae normal. MMM  Neck: non-tender, supple  Cardio: regular rate. regular rhythm   Resp: Normal  work of breathing, no respiratory distress, lungs clear bilaterally, no wheezing, rhonchi, rales  Chest/Back: no visual signs of trauma, no midline tenderness to palpation, no chest wall tenderness to palpation no CVA tenderness   Abdomen: soft, non distension, no tenderness, no peritoneal signs   Neuro: alert and fully oriented. CN II-XII grossly intact. Grossly normal strength and sensation in all extremities.   MSK: no deformities. Normal range of motion  Integumentary/Skin: no rash visualized, normal color  Psych: normal affect, normal behavior    ED Course      Procedures         EKG Interpretation:      Interpreted by Jamee Martin MD  Time reviewed: 0346  Symptoms at time of EKG: left side pain   Rhythm: normal sinus   Rate: normal  Axis: normal  Ectopy: none  Conduction: normal  ST Segments/ T Waves: No acute ischemic change  Q Waves: none  Comparison to prior: Unchanged    Clinical Impression: normal sinus rhythm with no acute ischemic change       Results for orders placed or performed during the hospital encounter of 11/27/22   XR Chest 2 Views     Status: None    Narrative    EXAM: XR CHEST 2 VIEWS  LOCATION: [facility]  DATE/TIME: 11/27/2022 5:19 AM    INDICATION: Left-sided chest pain.  COMPARISON: 09/05/2022. CT 09/09/2022.      Impression    IMPRESSION: Lungs clear. No pleural fluid or pneumothorax. Normal heart size and pulmonary vascularity. Left-sided Port-A-Cath with tip in the low SVC.   Dale Draw     Status: None    Narrative    The following orders were created for panel order [place] Draw.  Procedure                               Abnormality         Status                     ---------                               -----------         ------                     Extra Blue Top Tube[[id number]]                              Final result               Extra Red Top Tube[[id number]]                               Final result               Extra  Green Top (Lithium...[[id number]]                      Final result               Extra Purple Top Tube[[id number]]                            Final result                 Please view results for these tests on the individual orders.   Extra Blue Top Tube     Status: None   Result Value Ref Range    Hold Specimen JIC    Extra Red Top Tube     Status: None   Result Value Ref Range    Hold Specimen JIC    Extra Green Top (Lithium Heparin) Tube     Status: None   Result Value Ref Range    Hold Specimen JIC    Extra Purple Top Tube     Status: None   Result Value Ref Range    Hold Specimen JIC    Comprehensive metabolic panel     Status: Abnormal   Result Value Ref Range    Sodium 136 136 - 145 mmol/L    Potassium 3.4 3.4 - 5.3 mmol/L    Chloride 102 98 - 107 mmol/L    Carbon Dioxide (CO2) 17 (L) 22 - 29 mmol/L    Anion Gap 17 (H) 7 - 15 mmol/L    Urea Nitrogen 8.5 6.0 - 20.0 mg/dL    Creatinine 0.56 0.51 - 0.95 mg/dL    Calcium 9.2 8.6 - 10.0 mg/dL    Glucose 118 (H) 70 - 99 mg/dL    Alkaline Phosphatase 97 35 - 104 U/L    AST 40 (H) 10 - 35 U/L    ALT 21 10 - 35 U/L    Protein Total 8.3 6.4 - 8.3 g/dL    Albumin 4.7 3.5 - 5.2 g/dL    Bilirubin Total 2.9 (H) <=1.2 mg/dL    GFR Estimate >90 >60 mL/min/1.73m2   Lipase     Status: Normal   Result Value Ref Range    Lipase 19 13 - 60 U/L   Troponin T, High Sensitivity     Status: Normal   Result Value Ref Range    Troponin T, High Sensitivity <6 <=14 ng/L   Reticulocyte count including Retic Hgb     Status: Abnormal   Result Value Ref Range    % Reticulocyte 16.2 (H) 0.5 - 2.0 %    Absolute Reticulocyte 0.499 (H) 0.025 - 0.095 10e6/uL    Retic Hemoglobin 32.0 28.2 - 35.7 pg   D dimer quantitative     Status: Abnormal   Result Value Ref Range    D-Dimer Quantitative 3.74 (H) 0.00 - 0.50 ug/mL FEU    Narrative    This D-dimer assay is intended for use in conjunction with a clinical pretest probability assessment model to exclude pulmonary embolism (PE) and deep venous  thrombosis (DVT) in outpatients suspected of PE or DVT. The cut-off value is 0.50 ug/mL FEU.   CBC with platelets and differential     Status: Abnormal   Result Value Ref Range    WBC Count 18.9 (H) 4.0 - 11.0 10e3/uL    RBC Count 3.07 (L) 3.80 - 5.20 10e6/uL    Hemoglobin 8.3 (L) 11.7 - 15.7 g/dL    Hematocrit 24.8 (L) 35.0 - 47.0 %    MCV 81 78 - 100 fL    MCH 27.0 26.5 - 33.0 pg    MCHC 33.5 31.5 - 36.5 g/dL    RDW 23.9 (H) 10.0 - 15.0 %    Platelet Count 571 (H) 150 - 450 10e3/uL    % Neutrophils 77 %    % Lymphocytes 11 %    % Monocytes 5 %    % Eosinophils 4 %    % Basophils 2 %    % Immature Granulocytes 1 %    NRBCs per 100 WBC 1 (H) <1 /100    Absolute Neutrophils 14.7 (H) 1.6 - 8.3 10e3/uL    Absolute Lymphocytes 2.0 0.8 - 5.3 10e3/uL    Absolute Monocytes 1.0 0.0 - 1.3 10e3/uL    Absolute Eosinophils 0.8 (H) 0.0 - 0.7 10e3/uL    Absolute Basophils 0.3 (H) 0.0 - 0.2 10e3/uL    Absolute Immature Granulocytes 0.1 <=0.4 10e3/uL    Absolute NRBCs 0.2 10e3/uL   EKG 12-lead, tracing only     Status: None (Preliminary result)   Result Value Ref Range    Systolic Blood Pressure  mmHg    Diastolic Blood Pressure  mmHg    Ventricular Rate 88 BPM    Atrial Rate 88 BPM    IL Interval 158 ms    QRS Duration 84 ms     ms    QTc 474 ms    P Axis 61 degrees    R AXIS 23 degrees    T Axis 10 degrees    Interpretation ECG Sinus rhythm  Normal ECG      CBC with platelets differential     Status: Abnormal    Narrative    The following orders were created for panel order CBC with platelets differential.  Procedure                               Abnormality         Status                     ---------                               -----------         ------                     CBC with platelets and d...[[id number]]  Abnormal            Final result                 Please view results for these tests on the individual orders.     Medications   lactated ringers BOLUS 1,000 mL (0 mLs Intravenous Stopped 11/27/22 0540)      Followed by   lactated ringers infusion (has no administration in time range)   heparin lock flush 10 UNIT/ML injection 5-10 mL (5 mLs Intracatheter Given 11/27/22 0558)   heparin 100 UNIT/ML injection 5-10 mL (has no administration in time range)   sodium chloride (PF) 0.9% PF flush 10-20 mL (20 mLs Intracatheter Given 11/27/22 0558)   ondansetron (ZOFRAN) injection 4 mg (4 mg Intravenous Given 11/27/22 0343)   oxyCODONE (ROXICODONE) tablet 15 mg (15 mg Oral Given 11/27/22 0344)   ketorolac (TORADOL) injection 15 mg (15 mg Intravenous Given 11/27/22 0343)        Assessments & Plan (with Medical Decision Making)   Jennifer Cervantes is a 22 year old female who is well-known to the ED with history of sickle cell disease, cerebral infarction, cognitive development delay, chronic hemiplegia and hemiparesis from prior CVA, migraines, who presents to the ED with complaint of left side pain, nausea and vomiting.  Upon arrival patient is nontoxic-appearing, afebrile, moderate distress secondary to pain.  Patient complains of left side pain (chest/abdomen) as well as nausea and vomiting.  Patient denies any specific chest pain, abdominal pain, shortness of breath.  No recent upper respiratory symptoms.  Differential diagnosis includes but is not limited to musculoskeletal versus pneumonia versus pneumothorax versus pleural effusion versus ACS versus PE versus splenic etiology among others.  Upon arrival comprehensive labs and EKG were performed.  Patient was treated per current plan with oral oxycodone, IV Zofran, IV Toradol, and IV hydration.    I reviewed EKG which demonstrates normal sinus rhythm with ventricular rate of 88 bpm with no acute ischemic change, no significant change when compared to prior EKG.  I reviewed comprehensive labs which are remarkable for leukocytosis with white blood cell count of 18.9 (of note patient normally has some leukocytosis), hemoglobin 8.3 (near baseline), no acute metabolic or electrolyte  abnormality, no transaminitis, bilirubin mildly elevated 2.9, high sensitive troponin less than 6.  Lipase 19, D-dimer elevated at 3.74.  Per chart review patient normally with elevated D-dimer.  Patient is high risk no history of blood clots, not currently on anticoagulation.  I reviewed x-ray which is unremarkable with clear lungs, no evidence of focal infiltrate, pleural effusion, pneumothorax.    On reevaluation patient resting comfortably, feeling better with complete resolution of her symptoms.  Patient requesting discharge home.  I did discuss with patient due to her symptoms, history of PEs, and elevated D-dimer, would like to proceed with CT scan however unfortunately patient is allergic to contrast dye so would have to stay for pretreatment.  At this time patient does not want to stay for pretreatment and would like to go home as she is feeling better.  Patient is agreeable to close outpatient follow-up and strict return precautions.  On reevaluation patient continues to be resting comfortably, no distress, no tachycardia, no hypoxia, no respiratory distress.  Patient denies any chest pain, shortness of breath.  Patient understands risk of leaving without any further imaging.  Plan for discharge home with close outpatient follow-up and strict return precautions discussed if any chest pain, shortness of breath, new or worsening symptoms.  Patient understands and agrees with plan.    I have reviewed the nursing notes. I have reviewed the findings, diagnosis, plan and need for follow up with the patient.    New Prescriptions    No medications on file       Final diagnoses:   Left-sided chest pain   Nausea and vomiting in adult       --  Jamee Martin MD  [facility] EMERGENCY DEPARTMENT  11/27/2022     Jamee Martin MD  11/27/22 0607

## 2022-11-27 NOTE — ED TRIAGE NOTES
Pt presents to ED from home with c/o pain to left side of ribs when pt lays on left side, right sided head pain, vomiting 3 times in last 30 min. Breathing easy & unlabored.      Triage Assessment     Row Name 11/27/22 0236       Triage Assessment (Adult)    Airway WDL WDL       Respiratory WDL    Respiratory WDL WDL       Skin Circulation/Temperature WDL    Skin Circulation/Temperature WDL WDL       Cardiac WDL    Cardiac WDL WDL       Peripheral/Neurovascular WDL    Peripheral Neurovascular WDL WDL       Cognitive/Neuro/Behavioral WDL    Cognitive/Neuro/Behavioral WDL WDL

## 2022-11-27 NOTE — DISCHARGE INSTRUCTIONS
Thank you for your patience today.  Please follow-up with your regular doctor in the next 2-3 days for further evaluation and follow-up care.  Please call to schedule an appointment.  Please continue your own medications.  Please rest, drink plenty of fluids.  Please return to the ER if you develop high fever, severe pain, persistent vomiting, chest pain, shortness of breath, or any worsening of your current symptoms.  It was a pleasure taking care of you today.  We hope you feel better soon.

## 2022-11-28 ENCOUNTER — PATIENT OUTREACH (OUTPATIENT)
Dept: CARE COORDINATION | Facility: CLINIC | Age: 22
End: 2022-11-28

## 2022-11-28 ENCOUNTER — INFUSION THERAPY VISIT (OUTPATIENT)
Dept: TRANSPLANT | Facility: CLINIC | Age: 22
End: 2022-11-28
Attending: PEDIATRICS
Payer: COMMERCIAL

## 2022-11-28 ENCOUNTER — TELEPHONE (OUTPATIENT)
Dept: ONCOLOGY | Facility: CLINIC | Age: 22
End: 2022-11-28

## 2022-11-28 VITALS
DIASTOLIC BLOOD PRESSURE: 78 MMHG | RESPIRATION RATE: 16 BRPM | SYSTOLIC BLOOD PRESSURE: 117 MMHG | OXYGEN SATURATION: 94 % | BODY MASS INDEX: 28.08 KG/M2 | HEART RATE: 92 BPM | TEMPERATURE: 98.3 F | WEIGHT: 163.58 LBS

## 2022-11-28 DIAGNOSIS — D57.00 SICKLE CELL PAIN CRISIS (H): ICD-10-CM

## 2022-11-28 DIAGNOSIS — G81.10 SPASTIC HEMIPLEGIA, UNSPECIFIED ETIOLOGY, UNSPECIFIED LATERALITY (H): Primary | ICD-10-CM

## 2022-11-28 PROCEDURE — 96374 THER/PROPH/DIAG INJ IV PUSH: CPT

## 2022-11-28 PROCEDURE — 96376 TX/PRO/DX INJ SAME DRUG ADON: CPT

## 2022-11-28 PROCEDURE — 96361 HYDRATE IV INFUSION ADD-ON: CPT

## 2022-11-28 PROCEDURE — 250N000011 HC RX IP 250 OP 636: Performed by: REGISTERED NURSE

## 2022-11-28 PROCEDURE — 258N000003 HC RX IP 258 OP 636: Performed by: PEDIATRICS

## 2022-11-28 PROCEDURE — 250N000011 HC RX IP 250 OP 636: Performed by: PEDIATRICS

## 2022-11-28 RX ORDER — DIPHENHYDRAMINE HCL 25 MG
25 CAPSULE ORAL
Status: CANCELLED
Start: 2023-01-01

## 2022-11-28 RX ORDER — HEPARIN SODIUM,PORCINE 10 UNIT/ML
5 VIAL (ML) INTRAVENOUS
Status: CANCELLED | OUTPATIENT
Start: 2023-01-01

## 2022-11-28 RX ORDER — MORPHINE SULFATE 2 MG/ML
2 INJECTION, SOLUTION INTRAMUSCULAR; INTRAVENOUS
Status: DISCONTINUED | OUTPATIENT
Start: 2022-11-28 | End: 2022-11-28 | Stop reason: HOSPADM

## 2022-11-28 RX ORDER — MORPHINE SULFATE 2 MG/ML
2 INJECTION, SOLUTION INTRAMUSCULAR; INTRAVENOUS
Status: CANCELLED
Start: 2023-01-01

## 2022-11-28 RX ORDER — ONDANSETRON 8 MG/1
8 TABLET, FILM COATED ORAL
Status: CANCELLED
Start: 2023-01-01

## 2022-11-28 RX ORDER — HEPARIN SODIUM (PORCINE) LOCK FLUSH IV SOLN 100 UNIT/ML 100 UNIT/ML
5 SOLUTION INTRAVENOUS
Status: DISCONTINUED | OUTPATIENT
Start: 2022-11-28 | End: 2022-11-28 | Stop reason: HOSPADM

## 2022-11-28 RX ORDER — HEPARIN SODIUM (PORCINE) LOCK FLUSH IV SOLN 100 UNIT/ML 100 UNIT/ML
5 SOLUTION INTRAVENOUS
Status: CANCELLED | OUTPATIENT
Start: 2023-01-01

## 2022-11-28 RX ADMIN — Medication 5 ML: at 14:07

## 2022-11-28 RX ADMIN — MORPHINE SULFATE 2 MG: 2 INJECTION, SOLUTION INTRAMUSCULAR; INTRAVENOUS at 11:49

## 2022-11-28 RX ADMIN — DEXTROSE AND SODIUM CHLORIDE 1000 ML: 5; 450 INJECTION, SOLUTION INTRAVENOUS at 11:49

## 2022-11-28 RX ADMIN — MORPHINE SULFATE 2 MG: 2 INJECTION, SOLUTION INTRAMUSCULAR; INTRAVENOUS at 12:51

## 2022-11-28 RX ADMIN — MORPHINE SULFATE 2 MG: 2 INJECTION, SOLUTION INTRAMUSCULAR; INTRAVENOUS at 13:55

## 2022-11-28 ASSESSMENT — PAIN SCALES - GENERAL: PAINLEVEL: EXTREME PAIN (8)

## 2022-11-28 NOTE — PROGRESS NOTES
Social Work Note: Transportation  Oncology Clinic     Data/Intervention:  Patient Name:  Jennifer Cervantes DOB/Age: 2000      Call From: [facility]        Reason for Call:  Transportation     Assessment:   called [employer] ([phone]) to arrange ride. [employer] arranged  for patient from home with a will call return ride home.  Patient will need to call when ready for return ride home ([phone]).      Plan:  Patient is aware of the transportation plan.  available to assist with any other needs.      CARLOS Chavez,[facility]  Hematology/Oncology Social Worker  Phone:[phone] Pager: [phone]

## 2022-11-28 NOTE — NURSING NOTE
Chief Complaint   Patient presents with     Infusion     Pt here for IV Fluids and IV Pain Meds. Pt with Sickle Cell Pain Crisis.      Farhana Rust RN

## 2022-11-28 NOTE — TELEPHONE ENCOUNTER
Oncology Nurse Triage - Sickle Cell Pain Crisis:    Situation: Jennifer (pt)calling about Sickle Cell Pain Crisis    Background:     Patient's last infusion was 11/27/22 ED yesterday  Last clinic visit date:11/17/2022 Patricia Mantilla CNP  Next follow-up appt on 1/2/2023 with provider Dr. Duncan  Does patient have active treatment plan?  Yes     Assessment of Symptoms:  Onset/Duration of symptoms: 1 day    Is it typical sickle cell pain? Yes   Location: lower back  Character: Sharp           Intensity: 8/10    Any radiation of pain, numbness, tingling, weakness, warmth, swelling, discoloration of extremities?No     Fever?No    Chest Pain Present: No     Shortness of breath: No     Other home therapies tried: HEAT/HEATING PAD     Last home medication taken and when: 6:00am today oxycodone 15mg    Any Refills Needed?: No     Does patient have transportation & length of time to get to clinic: No     Recommendations:   0708 Paged Patricia Mantilla CNP  0803 Per sam Gomez for IVF/Pain per therapy plan.     0812 this writer called Jennifer with update for appt available at 11:00am for iVF/Pain and pt in agreement.     0814 Paged UDAY Fernando to assist with transportation    0815 sent message to scheduling      Please note, if you are late for your appt, you risk losing your infusion appt as it may delay another patient's infusion who arrived on time.

## 2022-11-28 NOTE — PROGRESS NOTES
"Infusion Nursing Note:  Jennifer Cervantes presents today for IV Fluids and IV Pain Meds. See MAR.     Patient seen by provider today: No   present during visit today: Not Applicable.    Note: Pt arrived to clinic today rating her Sickle Cell Pain an \"8/10\". States the Pain is mostly in her Low Back. IV Fluids administered over 2 hours, along with 3 doses of Morphine 2 mg IV (each given one hour apart). Prior to discharge Pt rating her Pain a \"3/10\".     Intravenous Access:  Implanted Port.    Treatment Conditions:  Not Applicable.    Post Infusion Assessment:  Patient tolerated infusion without incident.  Access discontinued per protocol.     Discharge Plan:   Patient discharged in stable condition accompanied by: self.  Departure Mode: Ambulatory.      Farhana Rust RN                      "

## 2022-11-28 NOTE — LETTER
"    11/28/2022         RE: Jennifer Cervantes  [address]  [place] [address]        Dear Colleague,    Thank you for referring your patient, Jennifer Cervantes, to the [facility]PL[facility] [place]. Please see a copy of my visit note below.    Infusion Nursing Note:  Jennifer Cervantes presents today for IV Fluids and IV Pain Meds. See MAR.     Patient seen by provider today: No   present during visit today: Not Applicable.    Note: Pt arrived to clinic today rating her Sickle Cell Pain an \"8/10\". States the Pain is mostly in her Low Back. IV Fluids administered over 2 hours, along with 3 doses of Morphine 2 mg IV (each given one hour apart). Prior to discharge Pt rating her Pain a \"3/10\".     Intravenous Access:  Implanted Port.    Treatment Conditions:  Not Applicable.    Post Infusion Assessment:  Patient tolerated infusion without incident.  Access discontinued per protocol.     Discharge Plan:   Patient discharged in stable condition accompanied by: self.  Departure Mode: Ambulatory.      Fahrana Rust RN                          Again, thank you for allowing me to participate in the care of your patient.        Sincerely,        No name on file    "

## 2022-12-01 ENCOUNTER — PATIENT OUTREACH (OUTPATIENT)
Dept: CARE COORDINATION | Facility: CLINIC | Age: 22
End: 2022-12-01

## 2022-12-01 ENCOUNTER — INFUSION THERAPY VISIT (OUTPATIENT)
Dept: ONCOLOGY | Facility: CLINIC | Age: 22
End: 2022-12-01
Attending: PEDIATRICS
Payer: COMMERCIAL

## 2022-12-01 ENCOUNTER — TELEPHONE (OUTPATIENT)
Dept: INTERNAL MEDICINE | Facility: CLINIC | Age: 22
End: 2022-12-01

## 2022-12-01 VITALS
TEMPERATURE: 98.5 F | DIASTOLIC BLOOD PRESSURE: 76 MMHG | HEART RATE: 85 BPM | OXYGEN SATURATION: 98 % | RESPIRATION RATE: 16 BRPM | SYSTOLIC BLOOD PRESSURE: 121 MMHG

## 2022-12-01 DIAGNOSIS — Z97.5 PRESENCE OF INTRAUTERINE CONTRACEPTIVE DEVICE: ICD-10-CM

## 2022-12-01 DIAGNOSIS — D57.00 SICKLE CELL PAIN CRISIS (H): ICD-10-CM

## 2022-12-01 DIAGNOSIS — G81.10 SPASTIC HEMIPLEGIA, UNSPECIFIED ETIOLOGY, UNSPECIFIED LATERALITY (H): Primary | ICD-10-CM

## 2022-12-01 PROCEDURE — 258N000003 HC RX IP 258 OP 636: Performed by: PEDIATRICS

## 2022-12-01 PROCEDURE — 96376 TX/PRO/DX INJ SAME DRUG ADON: CPT

## 2022-12-01 PROCEDURE — 250N000011 HC RX IP 250 OP 636: Performed by: PEDIATRICS

## 2022-12-01 PROCEDURE — 96361 HYDRATE IV INFUSION ADD-ON: CPT

## 2022-12-01 PROCEDURE — 96374 THER/PROPH/DIAG INJ IV PUSH: CPT

## 2022-12-01 PROCEDURE — 250N000011 HC RX IP 250 OP 636: Performed by: REGISTERED NURSE

## 2022-12-01 RX ORDER — DIPHENHYDRAMINE HCL 25 MG
25 CAPSULE ORAL
Status: DISCONTINUED | OUTPATIENT
Start: 2022-12-01 | End: 2022-12-01 | Stop reason: HOSPADM

## 2022-12-01 RX ORDER — HEPARIN SODIUM (PORCINE) LOCK FLUSH IV SOLN 100 UNIT/ML 100 UNIT/ML
5 SOLUTION INTRAVENOUS
Status: DISCONTINUED | OUTPATIENT
Start: 2022-12-01 | End: 2022-12-01 | Stop reason: HOSPADM

## 2022-12-01 RX ORDER — MORPHINE SULFATE 2 MG/ML
2 INJECTION, SOLUTION INTRAMUSCULAR; INTRAVENOUS
Status: CANCELLED
Start: 2023-01-01

## 2022-12-01 RX ORDER — HEPARIN SODIUM,PORCINE 10 UNIT/ML
5 VIAL (ML) INTRAVENOUS
Status: DISCONTINUED | OUTPATIENT
Start: 2022-12-01 | End: 2022-12-01 | Stop reason: HOSPADM

## 2022-12-01 RX ORDER — ONDANSETRON 8 MG/1
8 TABLET, FILM COATED ORAL
Status: CANCELLED
Start: 2023-01-01

## 2022-12-01 RX ORDER — HEPARIN SODIUM (PORCINE) LOCK FLUSH IV SOLN 100 UNIT/ML 100 UNIT/ML
5 SOLUTION INTRAVENOUS
Status: CANCELLED | OUTPATIENT
Start: 2023-01-01

## 2022-12-01 RX ORDER — MORPHINE SULFATE 2 MG/ML
2 INJECTION, SOLUTION INTRAMUSCULAR; INTRAVENOUS
Status: DISCONTINUED | OUTPATIENT
Start: 2022-12-01 | End: 2022-12-01 | Stop reason: HOSPADM

## 2022-12-01 RX ORDER — ONDANSETRON 8 MG/1
8 TABLET, ORALLY DISINTEGRATING ORAL
Status: DISCONTINUED | OUTPATIENT
Start: 2022-12-01 | End: 2022-12-01 | Stop reason: HOSPADM

## 2022-12-01 RX ORDER — OXYCODONE HYDROCHLORIDE 15 MG/1
15 TABLET ORAL EVERY 4 HOURS PRN
Qty: 40 TABLET | Refills: 0 | Status: SHIPPED | OUTPATIENT
Start: 2022-12-01 | End: 2022-12-08

## 2022-12-01 RX ORDER — DIPHENHYDRAMINE HCL 25 MG
25 CAPSULE ORAL
Status: CANCELLED
Start: 2023-01-01

## 2022-12-01 RX ORDER — HEPARIN SODIUM,PORCINE 10 UNIT/ML
5 VIAL (ML) INTRAVENOUS
Status: CANCELLED | OUTPATIENT
Start: 2023-01-01

## 2022-12-01 RX ADMIN — MORPHINE SULFATE 2 MG: 2 INJECTION, SOLUTION INTRAMUSCULAR; INTRAVENOUS at 12:43

## 2022-12-01 RX ADMIN — MORPHINE SULFATE 2 MG: 2 INJECTION, SOLUTION INTRAMUSCULAR; INTRAVENOUS at 14:45

## 2022-12-01 RX ADMIN — DEXTROSE AND SODIUM CHLORIDE 1000 ML: 5; 450 INJECTION, SOLUTION INTRAVENOUS at 12:41

## 2022-12-01 RX ADMIN — Medication 5 ML: at 14:45

## 2022-12-01 RX ADMIN — MORPHINE SULFATE 2 MG: 2 INJECTION, SOLUTION INTRAMUSCULAR; INTRAVENOUS at 13:44

## 2022-12-01 NOTE — PROGRESS NOTES
Infusion Nursing Note:  Jennifer Cervantes presents today for IV Fluids/Pain Medications.    Patient seen by provider today: No   present during visit today: Not Applicable.    Note: Jennifer denied fevers, chills, cough, SOB, and chest pain. Arrived to infusion center reporting 9/10 left sided rib pain and lower back pain. She reported this is typically of her sickle cell pain. Received 2mg IV morphine x 3. Pain down to 4/10 at time of discharge. Patient felt comfortable discharging home and continuing to manage her pain.    Intravenous Access:  Implanted Port.    Treatment Conditions:  Not Applicable.    Post Infusion Assessment:  Patient tolerated infusion without incident.  Blood return noted pre and post infusion.  Site patent and intact, free from redness, edema or discomfort.  No evidence of extravasations.  Access discontinued per protocol.     Discharge Plan:   Prescription refills given for oxycodone.  Discharge instructions reviewed with: Patient.  Patient and/or family verbalized understanding of discharge instructions and all questions answered.  AVS to patient via [employer].  Patient will call triage if she needs additional infusion appointments made.  Patient discharged in stable condition accompanied by: self.  Departure Mode: Ambulatory.  Face to Face time: 0.      Earnestine Hale RN

## 2022-12-01 NOTE — ASSESSMENT & PLAN NOTE
Allergic reaction  Takes benadryl at night for allergies and to help her sleep.  If allergies are acting up, she might take benadryl during the day.  Fish, seafood, contrast dye and gadolinium.

## 2022-12-01 NOTE — TELEPHONE ENCOUNTER
Oncology Nurse Triage - Sickle Cell Pain Crisis:    Situation: Jennifer (pt) calling about Sickle Cell Pain Crisis    Background:     Patient's last infusion was 11/28/22  Last clinic visit date:11/17/22  Next follow-up appt on 1/2/23 with provider Dr. Duncan  Does patient have active treatment plan?  Yes    Assessment of Symptoms:  Onset/Duration of symptoms: 1 day    Is it typical sickle cell pain? Yes   Location: lower back, sidee  Character: Sharp           Intensity: 9/10    Any radiation of pain, numbness, tingling, weakness, warmth, swelling, discoloration of extremities?No     Fever?No    Chest Pain Present: No     Shortness of breath: No     Other home therapies tried: HEAT/HEATING PAD     Last home medication taken and when: 1 tablet Oxycodone 15mg    Any Refills Needed?: Yes, oxycodone   reviewed:      Does patient have transportation & length of time to get to clinic: No       Grades for reference:       Grade 1 - MEETS PROTOCOL  o Patient has active treatment plan.   o Patients last scheduled clinic appointment was attended  o Patient has not been seen in infusion or ED in the last 3 days (clarify exclusions in therapy plans)   o Afebrile   o Typically sickle cell pain   o Home medications have been tried   o No other symptoms       Recommendations:   0738 appt available for today at 12;30 pm with Jennifer Martinez in agreement with plan, is aware to wait for call from .     0740 request sent to  for transportation    0741 Scheduling request sent    Please note, if you are late for your appt, you risk losing your infusion appt as it may delay another patient's infusion who arrived on time.

## 2022-12-01 NOTE — TELEPHONE ENCOUNTER
Team,  Patient is still listed as having an IUD in addition to being on depo.  I reviewed and wasn't able to find a removal date.   If necessary, please call patient to find out when she had the IUD removed. Approximation is ok.  Can you please review and update Past Histories and the Problem List.  Thanks  Mf

## 2022-12-01 NOTE — ASSESSMENT & PLAN NOTE
ASCUS needs to come back for another pap - she says she is comfortable having me do it. She will schedule.

## 2022-12-01 NOTE — ASSESSMENT & PLAN NOTE
On Depo for contraception and menstrual suppression  No periods  Depo every 3 months.  IUD in place is still marked in the problem list - I wasn't able to find a note about removal.

## 2022-12-01 NOTE — PROGRESS NOTES
Social Work Note: Transportation  Oncology Clinic     Data/Intervention:  Patient Name:  Jennifer Cervantes DOB/Age: 2000, 22 years old     Call From: [facility]        Reason for Call:  Transportation     Assessment:   called [employer] to arrange ride through patient's insurance. [employer] arranged  for patient from home with [employer]. Patient will need to call when ready for return ride home ([phone]).      Plan:  Patient is aware of the transportation plan.  available to assist with any other needs.      CARLOS Chavez,[facility]  Hematology/Oncology Social Worker  Phone:[phone] Pager: [phone]

## 2022-12-01 NOTE — PATIENT INSTRUCTIONS
[facility] Triage and after hours / weekends / holidays:  [phone]    Please call the triage or after hours line if you experience a temperature greater than or equal to 100.4, shaking chills, have uncontrolled nausea, vomiting and/or diarrhea, dizziness, shortness of breath, chest pain, bleeding, unexplained bruising, or if you have any other new/concerning symptoms, questions or concerns.      If you are having any concerning symptoms or wish to speak to a provider before your next infusion visit, please call your care coordinator or triage to notify them so we can adequately serve you.     If you need a refill on a narcotic prescription or other medication, please call before your infusion appointment.

## 2022-12-05 ENCOUNTER — TELEPHONE (OUTPATIENT)
Dept: ONCOLOGY | Facility: CLINIC | Age: 22
End: 2022-12-05

## 2022-12-05 ENCOUNTER — TELEPHONE (OUTPATIENT)
Dept: CARE COORDINATION | Facility: CLINIC | Age: 22
End: 2022-12-05

## 2022-12-05 ENCOUNTER — INFUSION THERAPY VISIT (OUTPATIENT)
Dept: TRANSPLANT | Facility: CLINIC | Age: 22
End: 2022-12-05
Attending: PEDIATRICS
Payer: COMMERCIAL

## 2022-12-05 VITALS
SYSTOLIC BLOOD PRESSURE: 126 MMHG | RESPIRATION RATE: 16 BRPM | OXYGEN SATURATION: 96 % | HEART RATE: 96 BPM | DIASTOLIC BLOOD PRESSURE: 81 MMHG | TEMPERATURE: 98.6 F

## 2022-12-05 DIAGNOSIS — G81.10 SPASTIC HEMIPLEGIA, UNSPECIFIED ETIOLOGY, UNSPECIFIED LATERALITY (H): Primary | ICD-10-CM

## 2022-12-05 DIAGNOSIS — D57.00 SICKLE CELL PAIN CRISIS (H): ICD-10-CM

## 2022-12-05 PROCEDURE — 258N000003 HC RX IP 258 OP 636: Performed by: PEDIATRICS

## 2022-12-05 PROCEDURE — 96374 THER/PROPH/DIAG INJ IV PUSH: CPT

## 2022-12-05 PROCEDURE — 96376 TX/PRO/DX INJ SAME DRUG ADON: CPT

## 2022-12-05 PROCEDURE — 250N000011 HC RX IP 250 OP 636: Performed by: REGISTERED NURSE

## 2022-12-05 PROCEDURE — 96361 HYDRATE IV INFUSION ADD-ON: CPT

## 2022-12-05 RX ORDER — ONDANSETRON 8 MG/1
8 TABLET, FILM COATED ORAL
Status: CANCELLED
Start: 2023-01-01

## 2022-12-05 RX ORDER — HEPARIN SODIUM,PORCINE 10 UNIT/ML
5 VIAL (ML) INTRAVENOUS
Status: CANCELLED | OUTPATIENT
Start: 2023-01-01

## 2022-12-05 RX ORDER — MORPHINE SULFATE 2 MG/ML
2 INJECTION, SOLUTION INTRAMUSCULAR; INTRAVENOUS
Status: CANCELLED
Start: 2023-01-01

## 2022-12-05 RX ORDER — DIPHENHYDRAMINE HCL 25 MG
25 CAPSULE ORAL
Status: CANCELLED
Start: 2023-01-01

## 2022-12-05 RX ORDER — HEPARIN SODIUM (PORCINE) LOCK FLUSH IV SOLN 100 UNIT/ML 100 UNIT/ML
5 SOLUTION INTRAVENOUS
Status: CANCELLED | OUTPATIENT
Start: 2023-01-01

## 2022-12-05 RX ORDER — MORPHINE SULFATE 2 MG/ML
2 INJECTION, SOLUTION INTRAMUSCULAR; INTRAVENOUS
Status: DISCONTINUED | OUTPATIENT
Start: 2022-12-05 | End: 2022-12-05 | Stop reason: HOSPADM

## 2022-12-05 RX ADMIN — MORPHINE SULFATE 2 MG: 2 INJECTION, SOLUTION INTRAMUSCULAR; INTRAVENOUS at 12:24

## 2022-12-05 RX ADMIN — MORPHINE SULFATE 2 MG: 2 INJECTION, SOLUTION INTRAMUSCULAR; INTRAVENOUS at 10:14

## 2022-12-05 RX ADMIN — MORPHINE SULFATE 2 MG: 2 INJECTION, SOLUTION INTRAMUSCULAR; INTRAVENOUS at 11:18

## 2022-12-05 RX ADMIN — DEXTROSE AND SODIUM CHLORIDE 1000 ML: 5; 450 INJECTION, SOLUTION INTRAVENOUS at 10:11

## 2022-12-05 NOTE — TELEPHONE ENCOUNTER
Pt called in to triage requesting IVF pain meds for generalized pain rated 10/10 x 1 days. Stated last took prn Oxycodoneat 6am this morning without relief. Denied any fevers, chills, cough, sob, chest pain, numbness or tingling or other symptoms not typical of sickle cell pain.   Pt's last infusion was 12/1/22, last clinic visit 11/17/22 Patricia Mantilla  with follow-up on 1/2/23  with Dr Duncan.   Patient states they do not have own ride and it will take 60 minutes long to get to cancer clinic.   Pt denied being out of home medications and needing any refills today.   Pt qualifies for sickle cell standing order protocol.  If you do not hear from the infusion center by 2pm then you will not be able to get in for an infusion today.   Please note, if you are late for your appt, you risk losing your infusion appt as it may delay another patient's infusion who arrived on time.     BMT can take Jennifer at 10am     Call placed to Nathalia and she can make the 10am appointment     Message sent to [employer] work to set up ride     Message sent to [facility]OD to schedule

## 2022-12-05 NOTE — TELEPHONE ENCOUNTER
SW received referral to assist with transportation coordination. SW called [employer] and arranged ride for patient. SW informed patient and clinic of ride information [employer]  with will call return ride. Patient will call [phone] for return ride when appointment is complete. SW will remain available as needed.         Corry GONZALEZ, [facility]  - Oncology  Phone : [phone]  Pager: [phone]

## 2022-12-05 NOTE — TELEPHONE ENCOUNTER
Jennifer called stating, pt is still at the clinic and when called transportation for ride home, they said both rides have been activated today (cab company sent both rides to pt's home when suppose to be a round trip) and pt needs to have a ride reactivated for return ride home from clinic.    This writer messages  team for follow up to call Jennifer back to assist with ride home.

## 2022-12-05 NOTE — LETTER
12/5/2022         RE: Jennifer Cervantes  [address]  [place] [address]        Dear Colleague,    Thank you for referring your patient, Jennifer Cervantes, to the [facility] [place]. Please see a copy of my visit note below.    Infusion Nursing Note:  Jennifer Cervantes presents today for add on IVF and pain meds.    Patient seen by provider today: No   present during visit today: Not Applicable.    Note: No labs/no provider visit; 1L D51/2NS given over 2 hours. 2mg IVP morphine given Q1HR for max 3 doses. Patient reported pain improvement 6/10 prior to discharge.    Intravenous Access:  Implanted Port.  +BR noted pre and post infusion  De-accessed prior to discharge.    Treatment Conditions:  Not Applicable.    Post Infusion Assessment:  Patient tolerated infusion without incident.     Discharge Plan:   Patient discharged in stable condition accompanied by: self.      Allison Wiggins RN                          Again, thank you for allowing me to participate in the care of your patient.        Sincerely,        No name on file

## 2022-12-08 ENCOUNTER — TELEPHONE (OUTPATIENT)
Dept: ONCOLOGY | Facility: CLINIC | Age: 22
End: 2022-12-08

## 2022-12-08 ENCOUNTER — PATIENT OUTREACH (OUTPATIENT)
Dept: CARE COORDINATION | Facility: CLINIC | Age: 22
End: 2022-12-08

## 2022-12-08 ENCOUNTER — INFUSION THERAPY VISIT (OUTPATIENT)
Dept: ONCOLOGY | Facility: CLINIC | Age: 22
End: 2022-12-08
Attending: PEDIATRICS
Payer: COMMERCIAL

## 2022-12-08 VITALS
SYSTOLIC BLOOD PRESSURE: 114 MMHG | TEMPERATURE: 97.9 F | OXYGEN SATURATION: 94 % | HEART RATE: 100 BPM | RESPIRATION RATE: 16 BRPM | DIASTOLIC BLOOD PRESSURE: 77 MMHG

## 2022-12-08 DIAGNOSIS — G81.10 SPASTIC HEMIPLEGIA, UNSPECIFIED ETIOLOGY, UNSPECIFIED LATERALITY (H): Primary | ICD-10-CM

## 2022-12-08 DIAGNOSIS — D57.00 SICKLE CELL PAIN CRISIS (H): ICD-10-CM

## 2022-12-08 PROCEDURE — 96361 HYDRATE IV INFUSION ADD-ON: CPT

## 2022-12-08 PROCEDURE — 96374 THER/PROPH/DIAG INJ IV PUSH: CPT

## 2022-12-08 PROCEDURE — 96376 TX/PRO/DX INJ SAME DRUG ADON: CPT

## 2022-12-08 PROCEDURE — 250N000011 HC RX IP 250 OP 636: Performed by: REGISTERED NURSE

## 2022-12-08 PROCEDURE — 258N000003 HC RX IP 258 OP 636: Performed by: PEDIATRICS

## 2022-12-08 RX ORDER — HEPARIN SODIUM (PORCINE) LOCK FLUSH IV SOLN 100 UNIT/ML 100 UNIT/ML
5 SOLUTION INTRAVENOUS
Status: CANCELLED | OUTPATIENT
Start: 2023-01-01

## 2022-12-08 RX ORDER — HEPARIN SODIUM,PORCINE 10 UNIT/ML
5 VIAL (ML) INTRAVENOUS
Status: CANCELLED | OUTPATIENT
Start: 2023-01-01

## 2022-12-08 RX ORDER — OXYCODONE HYDROCHLORIDE 15 MG/1
15 TABLET ORAL EVERY 4 HOURS PRN
Qty: 40 TABLET | Refills: 0 | Status: SHIPPED | OUTPATIENT
Start: 2022-12-08 | End: 2022-12-15

## 2022-12-08 RX ORDER — MORPHINE SULFATE 2 MG/ML
2 INJECTION, SOLUTION INTRAMUSCULAR; INTRAVENOUS
Status: DISCONTINUED | OUTPATIENT
Start: 2022-12-08 | End: 2022-12-08 | Stop reason: HOSPADM

## 2022-12-08 RX ORDER — ONDANSETRON 8 MG/1
8 TABLET, FILM COATED ORAL
Status: CANCELLED
Start: 2023-01-01

## 2022-12-08 RX ORDER — MORPHINE SULFATE 2 MG/ML
2 INJECTION, SOLUTION INTRAMUSCULAR; INTRAVENOUS
Status: CANCELLED
Start: 2023-01-01

## 2022-12-08 RX ORDER — DIPHENHYDRAMINE HCL 25 MG
25 CAPSULE ORAL
Status: CANCELLED
Start: 2023-01-01

## 2022-12-08 RX ADMIN — DEXTROSE AND SODIUM CHLORIDE 1000 ML: 5; 450 INJECTION, SOLUTION INTRAVENOUS at 12:56

## 2022-12-08 RX ADMIN — MORPHINE SULFATE 2 MG: 2 INJECTION, SOLUTION INTRAMUSCULAR; INTRAVENOUS at 14:56

## 2022-12-08 RX ADMIN — MORPHINE SULFATE 2 MG: 2 INJECTION, SOLUTION INTRAMUSCULAR; INTRAVENOUS at 12:59

## 2022-12-08 RX ADMIN — MORPHINE SULFATE 2 MG: 2 INJECTION, SOLUTION INTRAMUSCULAR; INTRAVENOUS at 13:55

## 2022-12-08 ASSESSMENT — PAIN SCALES - GENERAL: PAINLEVEL: EXTREME PAIN (8)

## 2022-12-08 NOTE — PROGRESS NOTES
Community Health Worker Note: Telephone Call  Oncology Clinic     Data/Intervention:  Patient Name:  Jennifer Cervantes DOB/Age: [birth year]     Call From: Triage Nurse        Reason for Call:  Transportation      Assessment:  CHW called [employer]  ([phone] )  to arrange ride through patient's insurance.  [employer] arranged a round trip ride as a will call.  Patient will need to call when ready for return ride home [phone]. Talked to patient and she will call [employer] to setup  time.     Plan:  Patient is aware of the transportation plan. /CHW available to assist with any other needs.      Isaura OTTO Community Health Worker  Clinic Care Coordination  [facility]  Phone: [phone]

## 2022-12-08 NOTE — TELEPHONE ENCOUNTER
Narcotic Refill Request    Medication(s) requested:  Oxycodone IR 15 mg tablet  Person Requesting Refill:Jennifer  What pain is the medication treating: sickle cell pain, low back pain  How is the medication being taken?: 15 mg q4h  Does pt have enough for today? Yes, will run out on the weekend  Is pain being adequately controlled on the current regimen?: Yes  Experiencing any side effects from medication?: No    Date of most recent appointment:  11/17/22 with Patricia Mantilla CNP  Any No Show Visits:No  Next appointment:   1/2/23 with Dr. Duncan  Last fill date and by whom:  12/1/22 by Patricia Mantilla CNP   Reviewed: No    Routed/Paged provider: Pended and Routed to Patricia Mantilla CNP

## 2022-12-08 NOTE — PROGRESS NOTES
Infusion Nursing Note:  Jennifer Cervantes presents today for IVF/pain medications.    Patient seen by provider today: No    Note: Pt arrived to infusion clinic with c/o generalized aching pain, 8/10.  Her pain goal today is 5/10.  She declines interventions aside from IVF/pain medications.  Denies s/s infection.  Pt arrived with 48-hour Desferal pump running, due to disconnect tomorrow.  Per pt, home nurse stated ok to stop for infusion today.  Spoke with Zaynab at [facility], [phone], who stated ok to stop Desferal for infusion and restart.  Flush line per and post.  Desferal stopped at 1258 and restarted at 1505.  Pump running at time of discharge.  Jennifer states home nurse will communicate with her to adjust tomorrow's disconnect time.  At time of discharge pain goal 5/10 reached.      Intravenous Access:  Implanted Port.    Treatment Conditions:  Not Applicable.    Post Infusion Assessment:  Patient tolerated infusion without incident.  Blood return noted pre and post infusion.  Site patent and intact, free from redness, edema or discomfort.  No evidence of extravasations.    Discharge Plan:   Prescription refills given for Oxy.  Discharge instructions reviewed with: Patient.  Patient and/or family verbalized understanding of discharge instructions and all questions answered.  AVS to patient via [employer].  Patient will return PRN for next appointment.   Patient discharged in stable condition accompanied by: self.  Departure Mode: Ambulatory.    Lupe Bolton RN

## 2022-12-08 NOTE — TELEPHONE ENCOUNTER
Pt called in to triage requesting IVF pain meds for generalized and lower back pain rated 8/10 starting a few hours ago. Stated last took prn oxycodone at 0600 this morning without relief. Denied any fevers, chills, cough, sob, chest pain, numbness or tingling or other symptoms not typical of sickle cell pain.   Pt's last infusion was 12/5, last clinic visit 11/17/22 with follow-up on 1/2/23 with Dr. Duncan.   Patient states needs ride, 25 minutes   Pt requesting oxycodone refill. Pended up in separate encounter.  Pt qualifies for sickle cell standing order protocol.  If you do not hear from the infusion center by 2pm then you will not be able to get in for an infusion today.     Added to infusion wait list.    Contacted Patricia Mantilla CNP to ask if she can update the therapy plan authorization.    Ange can offer 0830 apt.  Jennifer does not have a ride for this apt.  Per Criss, pt can come at 1300--Jennifer was offered this apt and confirmed that she can come for this apt.  Updated Charge Nurse.  Message sent to CCOD to assist with scheduling.  Message sent to  to assist with transportation.    Routed to Patricia Mantilla CNP and MAURISIO Asencio

## 2022-12-08 NOTE — PATIENT INSTRUCTIONS
Contact Numbers    [facility]: [phone]    Call with chills and/or temperature greater than or equal to 100.5, uncontrolled nausea/vomiting, diarrhea, constipation, dizziness, shortness of breath, chest pain, bleeding, unexplained bruising, or any new/concerning symptoms, questions/concerns.     If you are having any concerning symptoms or wish to speak to a provider before your next infusion visit, please call your care coordinator or triage to notify them so we can adequately serve you.       After Hours: [phone]    If after hours, weekends, or holidays, call main hospital  and ask for Oncology doctor on call.        Lab Results:  No results found for this or any previous visit (from the past 12 hour(s)).

## 2022-12-12 ENCOUNTER — PATIENT OUTREACH (OUTPATIENT)
Dept: CARE COORDINATION | Facility: CLINIC | Age: 22
End: 2022-12-12

## 2022-12-12 ENCOUNTER — INFUSION THERAPY VISIT (OUTPATIENT)
Dept: ONCOLOGY | Facility: CLINIC | Age: 22
End: 2022-12-12
Attending: PEDIATRICS
Payer: COMMERCIAL

## 2022-12-12 ENCOUNTER — TELEPHONE (OUTPATIENT)
Dept: ONCOLOGY | Facility: CLINIC | Age: 22
End: 2022-12-12

## 2022-12-12 VITALS
RESPIRATION RATE: 16 BRPM | OXYGEN SATURATION: 93 % | HEART RATE: 97 BPM | TEMPERATURE: 98.1 F | DIASTOLIC BLOOD PRESSURE: 78 MMHG | SYSTOLIC BLOOD PRESSURE: 117 MMHG

## 2022-12-12 DIAGNOSIS — D57.00 SICKLE CELL PAIN CRISIS (H): ICD-10-CM

## 2022-12-12 DIAGNOSIS — G81.10 SPASTIC HEMIPLEGIA, UNSPECIFIED ETIOLOGY, UNSPECIFIED LATERALITY (H): Primary | ICD-10-CM

## 2022-12-12 PROCEDURE — 96376 TX/PRO/DX INJ SAME DRUG ADON: CPT

## 2022-12-12 PROCEDURE — 250N000011 HC RX IP 250 OP 636: Performed by: PEDIATRICS

## 2022-12-12 PROCEDURE — 96374 THER/PROPH/DIAG INJ IV PUSH: CPT

## 2022-12-12 PROCEDURE — 96361 HYDRATE IV INFUSION ADD-ON: CPT

## 2022-12-12 PROCEDURE — 258N000003 HC RX IP 258 OP 636: Performed by: PEDIATRICS

## 2022-12-12 PROCEDURE — 250N000011 HC RX IP 250 OP 636: Performed by: REGISTERED NURSE

## 2022-12-12 RX ORDER — HEPARIN SODIUM (PORCINE) LOCK FLUSH IV SOLN 100 UNIT/ML 100 UNIT/ML
5 SOLUTION INTRAVENOUS
Status: DISCONTINUED | OUTPATIENT
Start: 2022-12-12 | End: 2022-12-12 | Stop reason: HOSPADM

## 2022-12-12 RX ORDER — DIPHENHYDRAMINE HCL 25 MG
25 CAPSULE ORAL
Status: CANCELLED
Start: 2023-01-01

## 2022-12-12 RX ORDER — HEPARIN SODIUM (PORCINE) LOCK FLUSH IV SOLN 100 UNIT/ML 100 UNIT/ML
5 SOLUTION INTRAVENOUS
Status: CANCELLED | OUTPATIENT
Start: 2023-01-01

## 2022-12-12 RX ORDER — HEPARIN SODIUM,PORCINE 10 UNIT/ML
5 VIAL (ML) INTRAVENOUS
Status: CANCELLED | OUTPATIENT
Start: 2023-01-01

## 2022-12-12 RX ORDER — ONDANSETRON 8 MG/1
8 TABLET, FILM COATED ORAL
Status: CANCELLED
Start: 2023-01-01

## 2022-12-12 RX ORDER — MORPHINE SULFATE 2 MG/ML
2 INJECTION, SOLUTION INTRAMUSCULAR; INTRAVENOUS
Status: CANCELLED
Start: 2023-01-01

## 2022-12-12 RX ORDER — MORPHINE SULFATE 2 MG/ML
2 INJECTION, SOLUTION INTRAMUSCULAR; INTRAVENOUS
Status: DISCONTINUED | OUTPATIENT
Start: 2022-12-12 | End: 2022-12-12 | Stop reason: HOSPADM

## 2022-12-12 RX ADMIN — MORPHINE SULFATE 2 MG: 2 INJECTION, SOLUTION INTRAMUSCULAR; INTRAVENOUS at 14:13

## 2022-12-12 RX ADMIN — MORPHINE SULFATE 2 MG: 2 INJECTION, SOLUTION INTRAMUSCULAR; INTRAVENOUS at 15:13

## 2022-12-12 RX ADMIN — DEXTROSE AND SODIUM CHLORIDE 1000 ML: 5; 450 INJECTION, SOLUTION INTRAVENOUS at 13:11

## 2022-12-12 RX ADMIN — Medication 5 ML: at 15:18

## 2022-12-12 RX ADMIN — MORPHINE SULFATE 2 MG: 2 INJECTION, SOLUTION INTRAMUSCULAR; INTRAVENOUS at 13:11

## 2022-12-12 NOTE — PROGRESS NOTES
Infusion Nursing Note:  Jennifer Cervantes presents today for IV fluid and Pain Medication.      Note: Jennifer comes today with pain in her neck, shoulders, back, and arms.  Rating pain at 8/10.  Denies fevers at home.  Denies chest pain.  Denies signs or symptoms of infection.    3 doses of morphine given for pain. Prior to discharge pain 4/10.    Intravenous Access:  Peripheral IV placed in lab      Post Infusion Assessment:  Patient tolerated infusion without incident.     Discharge Plan:     AVS to patient via MYCHART.  Patient will return 1/2/23 to see Dr Duncan in clinic    Marina Leblanc RN

## 2022-12-12 NOTE — PROGRESS NOTES
Social Work Note: Transportation  Oncology Clinic     Data/Intervention:  Patient Name:  Jennifer Cervantes DOB/Age: 2000, 22 years old     Call From: [facility]riage        Reason for Call:  Transportation     Assessment:   called [employer] to arrange ride through patient's insurance. [employer] arranged  for patient from home with [employer].  Patient will need to call when ready for return ride home ([phone]).      Plan:  Patient is aware of the transportation plan.  available to assist with any other needs.      CARLOS Chavez,[facility]  Hematology/Oncology Social Worker  Phone:[phone] Pager: [phone]

## 2022-12-15 ENCOUNTER — PATIENT OUTREACH (OUTPATIENT)
Dept: CARE COORDINATION | Facility: CLINIC | Age: 22
End: 2022-12-15

## 2022-12-15 ENCOUNTER — INFUSION THERAPY VISIT (OUTPATIENT)
Dept: ONCOLOGY | Facility: CLINIC | Age: 22
End: 2022-12-15
Attending: PEDIATRICS
Payer: COMMERCIAL

## 2022-12-15 ENCOUNTER — NURSE TRIAGE (OUTPATIENT)
Dept: ONCOLOGY | Facility: CLINIC | Age: 22
End: 2022-12-15

## 2022-12-15 VITALS
SYSTOLIC BLOOD PRESSURE: 140 MMHG | DIASTOLIC BLOOD PRESSURE: 90 MMHG | RESPIRATION RATE: 18 BRPM | HEART RATE: 104 BPM | TEMPERATURE: 97.9 F | OXYGEN SATURATION: 92 %

## 2022-12-15 DIAGNOSIS — G81.10 SPASTIC HEMIPLEGIA, UNSPECIFIED ETIOLOGY, UNSPECIFIED LATERALITY (H): Primary | ICD-10-CM

## 2022-12-15 DIAGNOSIS — D57.00 SICKLE CELL PAIN CRISIS (H): ICD-10-CM

## 2022-12-15 PROCEDURE — 250N000011 HC RX IP 250 OP 636: Performed by: REGISTERED NURSE

## 2022-12-15 PROCEDURE — 96376 TX/PRO/DX INJ SAME DRUG ADON: CPT

## 2022-12-15 PROCEDURE — 96361 HYDRATE IV INFUSION ADD-ON: CPT

## 2022-12-15 PROCEDURE — 96374 THER/PROPH/DIAG INJ IV PUSH: CPT

## 2022-12-15 PROCEDURE — 250N000011 HC RX IP 250 OP 636: Performed by: PEDIATRICS

## 2022-12-15 PROCEDURE — 99207 PR NO BILLABLE SERVICE THIS VISIT: CPT

## 2022-12-15 PROCEDURE — 258N000003 HC RX IP 258 OP 636: Performed by: PEDIATRICS

## 2022-12-15 RX ORDER — OXYCODONE HYDROCHLORIDE 15 MG/1
15 TABLET ORAL EVERY 4 HOURS PRN
Qty: 40 TABLET | Refills: 0 | Status: SHIPPED | OUTPATIENT
Start: 2022-12-15 | End: 2022-12-21

## 2022-12-15 RX ORDER — HEPARIN SODIUM,PORCINE 10 UNIT/ML
5 VIAL (ML) INTRAVENOUS
Status: CANCELLED | OUTPATIENT
Start: 2023-01-01

## 2022-12-15 RX ORDER — MORPHINE SULFATE 2 MG/ML
2 INJECTION, SOLUTION INTRAMUSCULAR; INTRAVENOUS
Status: CANCELLED
Start: 2023-01-01

## 2022-12-15 RX ORDER — HEPARIN SODIUM (PORCINE) LOCK FLUSH IV SOLN 100 UNIT/ML 100 UNIT/ML
5 SOLUTION INTRAVENOUS
Status: CANCELLED | OUTPATIENT
Start: 2023-01-01

## 2022-12-15 RX ORDER — DIPHENHYDRAMINE HCL 25 MG
25 CAPSULE ORAL
Status: CANCELLED
Start: 2023-01-01

## 2022-12-15 RX ORDER — MORPHINE SULFATE 2 MG/ML
2 INJECTION, SOLUTION INTRAMUSCULAR; INTRAVENOUS
Status: DISCONTINUED | OUTPATIENT
Start: 2022-12-15 | End: 2022-12-15 | Stop reason: HOSPADM

## 2022-12-15 RX ORDER — ONDANSETRON 8 MG/1
8 TABLET, FILM COATED ORAL
Status: CANCELLED
Start: 2023-01-01

## 2022-12-15 RX ORDER — HEPARIN SODIUM (PORCINE) LOCK FLUSH IV SOLN 100 UNIT/ML 100 UNIT/ML
5 SOLUTION INTRAVENOUS
Status: DISCONTINUED | OUTPATIENT
Start: 2022-12-15 | End: 2022-12-15 | Stop reason: HOSPADM

## 2022-12-15 RX ADMIN — DEXTROSE AND SODIUM CHLORIDE 1000 ML: 5; 450 INJECTION, SOLUTION INTRAVENOUS at 11:03

## 2022-12-15 RX ADMIN — MORPHINE SULFATE 2 MG: 2 INJECTION, SOLUTION INTRAMUSCULAR; INTRAVENOUS at 13:14

## 2022-12-15 RX ADMIN — Medication 5 ML: at 13:25

## 2022-12-15 RX ADMIN — MORPHINE SULFATE 2 MG: 2 INJECTION, SOLUTION INTRAMUSCULAR; INTRAVENOUS at 12:11

## 2022-12-15 RX ADMIN — MORPHINE SULFATE 2 MG: 2 INJECTION, SOLUTION INTRAMUSCULAR; INTRAVENOUS at 11:03

## 2022-12-15 NOTE — PATIENT INSTRUCTIONS
Contact Numbers  [facility]: [phone]    After Hours:  [phone]  Triage: [phone]    Please call the [facility] Triage line if you experience a temperature greater than or equal to 100.5, shaking chills, have uncontrolled nausea, vomiting and/or diarrhea, dizziness, shortness of breath, chest pain, bleeding, unexplained bruising, or if you have any other new/concerning symptoms, questions or concerns.     If it is after hours, weekends, or holidays, please call the main hospital  at  [phone] and ask to speak to the Oncology doctor on call.     If you are having any concerning symptoms or wish to speak to a provider before your next infusion visit, please call your care coordinator or triage to notify them so we can adequately serve you.     If you need a refill on a narcotic prescription or other medication, please call triage before your infusion appointment.         December 2022 Sunday Monday Tuesday Wednesday Thursday Friday Saturday                       1    ONC INFUSION 2 HR (120 MIN)  12:30 PM   (120 min.)    ONC INFUSION NURSE   [facility] 2     3       4     5    BMT INFUSION 2 HR (120 MIN)  10:00 AM   (120 min.)    BMT INFUSION NURSE   [facility] [place] 6     7     8    ONC INFUSION 2 HR (120 MIN)   1:00 PM   (120 min.)    ONC INFUSION NURSE   [facility] 9     10       11     12    ONC INFUSION 2 HR (120 MIN)  12:30 PM   (30 min.)    ONC INFUSION NURSE   [facility] 13     14     15    ONC INFUSION 2 HR (120 MIN)  11:00 AM   (120 min.)    ONC INFUSION NURSE   [facility] 16     17       18     19     20     21     22     23     24       25     26     27     28     29     30     31 January 2023 Sunday Monday Tuesday Wednesday Thursday Friday Saturday   1     2    LAB CENTRAL    8:30 AM   (15 min.)   [facility] LAB DRAW   [facility]    ONC INFUSION 2 HR (120 MIN)   9:00 AM   (120 min.)    ONC INFUSION NURSE   [facility]    RETURN  11:15 AM   (30 min.)   Eric Duncan MD   [facility] 3     4     5    RETURN  10:45 AM   (60 min.)   Katherine Pierce MD   [facility] 6     7       8     9     10     11     12     13     14       15     16     17     18     19     20     21       22     23     24     25     26     27     28       29     30     31                                           Lab Results:  No results found for this or any previous visit (from the past 12 hour(s)).

## 2022-12-15 NOTE — PROGRESS NOTES
Social Work Note: Transportation  Oncology Clinic     Data/Intervention:  Patient Name:  Jennifer Cervantes  /Age: 2000, 22 years old     Call From: [facility] Triage        Reason for Call:  Transportation     Assessment:   called [employer] to arrange ride through patient's insurance, but they are not accommodating any same day rides due to weather. SW attempted to set up ride through [employer], but they are currently booked for the day and not accepting any additional rides due to weather as well. SW called patient and informed them of this and their sister is able to provide a ride for today's appointment.     Plan:  Patient's sister will provide ride to and from for today's appointment. SW will continue to remain available as needed.     CARLOS Chavez,[facility]  Hematology/Oncology Social Worker  Phone:[phone] Pager: [phone]

## 2022-12-15 NOTE — TELEPHONE ENCOUNTER
Refill Request    Date of most recent appointment:  11/17/22 Jose Rafael  Next upcoming appointment: 1/2/23 Perla    Medication requested:  Oxy 15 mg  Quantity: 40  Last fill date: 12/8/22  Person requesting refill: Jennifer  Notes: She states she takes a max of 6/day, no side effects and will be out on the weekend.    Prescribing provider(s): Jose Rafael    Routing to Patricia Mantilla

## 2022-12-15 NOTE — TELEPHONE ENCOUNTER
Pt called in to triage requesting IVF pain meds for neck, shoulder and arm pain rated 9/10 x a few hours. Stated last took oxy this morning with minimal relief. Denied any fevers, chills, cough, sob, chest pain or other symptoms.  Assess for confusion, numbness, paralysis, vomiting, headache, vision issues, difficulty walking and/or talking. Pt's last infusion was 12/12/22, last clinic visit 11/17/22 Jose Rafael with follow-up on 1/2/23 with Perla. Patient states they do not  have own ride and it will take 60 minutes to get to cancer clinic. Pt will be out of home medications and needs refill on oxy before the weekend. Pt qualifies for sickle cell standing order protocol.    Placed patient on waiting list and advised if continues to be in pain and we do not call by 2 pm she should go to the ED.  Jennifer verbalized understanding of advice.    738 am Spoke to Jennifer, she can make the 11 am and SW will call her and set up a ride both ways.

## 2022-12-19 ENCOUNTER — TELEPHONE (OUTPATIENT)
Dept: AUDIOLOGY | Facility: CLINIC | Age: 22
End: 2022-12-19

## 2022-12-19 ENCOUNTER — TELEPHONE (OUTPATIENT)
Dept: ONCOLOGY | Facility: CLINIC | Age: 22
End: 2022-12-19

## 2022-12-19 ENCOUNTER — INFUSION THERAPY VISIT (OUTPATIENT)
Dept: INFUSION THERAPY | Facility: CLINIC | Age: 22
End: 2022-12-19
Attending: PEDIATRICS
Payer: COMMERCIAL

## 2022-12-19 ENCOUNTER — PATIENT OUTREACH (OUTPATIENT)
Dept: CARE COORDINATION | Facility: CLINIC | Age: 22
End: 2022-12-19

## 2022-12-19 VITALS
RESPIRATION RATE: 18 BRPM | SYSTOLIC BLOOD PRESSURE: 112 MMHG | DIASTOLIC BLOOD PRESSURE: 70 MMHG | TEMPERATURE: 98.7 F | HEART RATE: 101 BPM

## 2022-12-19 DIAGNOSIS — G81.10 SPASTIC HEMIPLEGIA, UNSPECIFIED ETIOLOGY, UNSPECIFIED LATERALITY (H): Primary | ICD-10-CM

## 2022-12-19 DIAGNOSIS — D57.00 SICKLE CELL PAIN CRISIS (H): ICD-10-CM

## 2022-12-19 PROCEDURE — 96361 HYDRATE IV INFUSION ADD-ON: CPT

## 2022-12-19 PROCEDURE — 96376 TX/PRO/DX INJ SAME DRUG ADON: CPT

## 2022-12-19 PROCEDURE — 96374 THER/PROPH/DIAG INJ IV PUSH: CPT

## 2022-12-19 PROCEDURE — 250N000011 HC RX IP 250 OP 636: Performed by: REGISTERED NURSE

## 2022-12-19 PROCEDURE — 250N000011 HC RX IP 250 OP 636: Performed by: PEDIATRICS

## 2022-12-19 PROCEDURE — 258N000003 HC RX IP 258 OP 636: Performed by: PEDIATRICS

## 2022-12-19 RX ORDER — HEPARIN SODIUM (PORCINE) LOCK FLUSH IV SOLN 100 UNIT/ML 100 UNIT/ML
5 SOLUTION INTRAVENOUS
Status: DISCONTINUED | OUTPATIENT
Start: 2022-12-19 | End: 2022-12-19 | Stop reason: HOSPADM

## 2022-12-19 RX ORDER — DIPHENHYDRAMINE HCL 25 MG
25 CAPSULE ORAL
Status: CANCELLED
Start: 2023-01-01

## 2022-12-19 RX ORDER — MORPHINE SULFATE 2 MG/ML
2 INJECTION, SOLUTION INTRAMUSCULAR; INTRAVENOUS
Status: DISCONTINUED | OUTPATIENT
Start: 2022-12-19 | End: 2022-12-19 | Stop reason: HOSPADM

## 2022-12-19 RX ORDER — HEPARIN SODIUM (PORCINE) LOCK FLUSH IV SOLN 100 UNIT/ML 100 UNIT/ML
5 SOLUTION INTRAVENOUS
Status: CANCELLED | OUTPATIENT
Start: 2023-01-01

## 2022-12-19 RX ORDER — HEPARIN SODIUM,PORCINE 10 UNIT/ML
5 VIAL (ML) INTRAVENOUS
Status: CANCELLED | OUTPATIENT
Start: 2023-01-01

## 2022-12-19 RX ORDER — ONDANSETRON 8 MG/1
8 TABLET, FILM COATED ORAL
Status: CANCELLED
Start: 2023-01-01

## 2022-12-19 RX ORDER — MORPHINE SULFATE 2 MG/ML
2 INJECTION, SOLUTION INTRAMUSCULAR; INTRAVENOUS
Status: CANCELLED
Start: 2023-01-01

## 2022-12-19 RX ADMIN — MORPHINE SULFATE 2 MG: 2 INJECTION, SOLUTION INTRAMUSCULAR; INTRAVENOUS at 14:06

## 2022-12-19 RX ADMIN — DEXTROSE AND SODIUM CHLORIDE 1000 ML: 5; 450 INJECTION, SOLUTION INTRAVENOUS at 14:04

## 2022-12-19 RX ADMIN — MORPHINE SULFATE 2 MG: 2 INJECTION, SOLUTION INTRAMUSCULAR; INTRAVENOUS at 16:02

## 2022-12-19 RX ADMIN — MORPHINE SULFATE 2 MG: 2 INJECTION, SOLUTION INTRAMUSCULAR; INTRAVENOUS at 15:03

## 2022-12-19 RX ADMIN — Medication 5 ML: at 16:38

## 2022-12-19 NOTE — TELEPHONE ENCOUNTER
0910 appt opened up last minute for a 9:30am appt with BMT infusion clinic, this appt was offered to [facility], however due to [facility]'s need for transportation being arranged and unable to find ride on own, pt was unable to take the 9:30am appt. Pt will remain on waiting list if appt opens up later with time to arrange transportation.

## 2022-12-19 NOTE — PROGRESS NOTES
Nursing Note  Jennifer Cervantes presents today to [facility] for:   Chief Complaint   Patient presents with     Infusion     IVF + pain meds       During today's [facility] appointment, orders from Dr. Duncan were completed.  Frequency: as needed for sickle cell related pain    Progress note:  Patient identification verified by name and date of birth.  Assessment completed.  Vitals recorded in Doc Flowsheets.  Patient was provided with education regarding medication/procedure and possible side effects.  Patient verbalized understanding.     present during visit today: Not Applicable.    Treatment Conditions: Non-applicable.    Premedications: were not ordered.    Drug Waste Record: No    Infusion length and rate:  infusion given over approximately 2 hours    Labs: were not ordered for this appointment.    Vascular access: port accessed today.    Is the next appt scheduled? prn    Post Infusion Assessment:  Patient tolerated infusion without incident.     Discharge Plan:   Follow up plan of care with: ordering provider as scheduled.  Discharge instructions were reviewed with patient.  Patient/representative verbalized understanding of discharge instructions and all questions answered.  Patient discharged from [facility] in stable condition.    Yina Ford RN    Administrations This Visit     dextrose 5% and 0.45% NaCl BOLUS     Admin Date  12/19/2022 Action  New Bag Dose  1,000 mL Rate  500 mL/hr Route  Intravenous Administered By  Yina Ford, JOE          heparin 100 UNIT/ML injection 5 mL     Admin Date  12/19/2022 Action  Given Dose  5 mL Route  Intracatheter Administered By  Yina Ford RN          morphine (PF) injection 2 mg     Admin Date  12/19/2022 Action  Given Dose  2 mg Route  Intravenous Administered By  Yina Ford RN           Admin Date  12/19/2022 Action  Given Dose  2 mg Route  Intravenous  Administered By  Yina Ford, RN           Admin Date  12/19/2022 Action  Given Dose  2 mg Route  Intravenous Administered By  Yina Ford, RN              /82   Pulse 108   Temp 98.7  F (37.1  C) (Oral)   Resp 18

## 2022-12-19 NOTE — TELEPHONE ENCOUNTER
Pt called in to triage requesting IVF pain meds for lower back pain rated 9/10 x 1 days. Stated last took prn oxycodone at 0600 this morning without relief. Denied any fevers, chills, cough, sob, chest pain, numbness or tingling or other symptoms not typical of sickle cell pain.   Pt's last infusion was 12/15/22, last clinic visit 11/17/22 with follow-up on 1/2/23 with Dr. Duncan.   Patient states needs transportation. 25 minutes.Pt denied being out of home medications and needing any refills today.   Pt qualifies for sickle cell standing order protocol.  If you do not hear from the infusion center by 2pm then you will not be able to get in for an infusion today.     Added to infusion wait list.

## 2022-12-19 NOTE — TELEPHONE ENCOUNTER
0926 appt became available for 2:00pm in [facility] infusion clinic. Jennifer is able to take appt.     0932  contacted to help arrange transportation      0982 Scheduling request sent

## 2022-12-19 NOTE — PROGRESS NOTES
Social Work Note: Transportation  Oncology Clinic     Data/Intervention:  Patient Name:  Jennifer Cervantes DOB/Age: 2000, 22 years old     Call From: [facility]        Reason for Call:  Transportation     Assessment:   called [employer] to arrange ride through patient's insurance. [employer] arranged  for patient from home with [employer].  Patient will need to call when ready for return ride home ([phone]).      Plan:  Patient is aware of the transportation plan.  available to assist with any other needs.      CARLOS Chavez,[facility]  Hematology/Oncology Social Worker  Phone:[phone] Pager: [phone]

## 2022-12-19 NOTE — LETTER
12/19/2022         RE: Jennifer Cervantes  [address]  [facility] [address]        Dear Colleague,    Thank you for referring your patient, Jennifer Cervantes, to the [facility]. Please see a copy of my visit note below.    Nursing Note  Jennifer Cervantes presents [facility] for:   Chief Complaint   Patient presents with     Infusion     IVF + pain meds       During today's [facility] appointment, orders from Dr. Duncan were completed.  Frequency: as needed for sickle cell related pain    Progress note:  Patient identification verified by name and date of birth.  Assessment completed.  Vitals recorded in Doc Flowsheets.  Patient was provided with education regarding medication/procedure and possible side effects.  Patient verbalized understanding.     present during visit today: Not Applicable.    Treatment Conditions: Non-applicable.    Premedications: were not ordered.    Drug Waste Record: No    Infusion length and rate:  infusion given over approximately 2 hours    Labs: were not ordered for this appointment.    Vascular access: port accessed today.    Is the next appt scheduled? prn    Post Infusion Assessment:  Patient tolerated infusion without incident.     Discharge Plan:   Follow up plan of care with: ordering provider as scheduled.  Discharge instructions were reviewed with patient.  Patient/representative verbalized understanding of discharge instructions and all questions answered.  Patient discharged [facility] in stable condition.    Yina Ford RN    Administrations This Visit     dextrose 5% and 0.45% NaCl BOLUS     Admin Date  12/19/2022 Action  New Bag Dose  1,000 mL Rate  500 mL/hr Route  Intravenous Administered By  Yina Ford, JOE          heparin 100 UNIT/ML injection 5 mL     Admin Date  12/19/2022 Action  Given Dose  5 mL  Route  Intracatheter Administered By  Yina Ford, RN          morphine (PF) injection 2 mg     Admin Date  12/19/2022 Action  Given Dose  2 mg Route  Intravenous Administered By  Yina Ford RN           Admin Date  12/19/2022 Action  Given Dose  2 mg Route  Intravenous Administered By  Yina Ford, JOE           Admin Date  12/19/2022 Action  Given Dose  2 mg Route  Intravenous Administered By  Yina Ford, JOE              /82   Pulse 108   Temp 98.7  F (37.1  C) (Oral)   Resp 18           Again, thank you for allowing me to participate in the care of your patient.        Sincerely,        No name on file

## 2022-12-21 ENCOUNTER — TELEPHONE (OUTPATIENT)
Dept: ONCOLOGY | Facility: CLINIC | Age: 22
End: 2022-12-21

## 2022-12-21 DIAGNOSIS — K29.00 OTHER ACUTE GASTRITIS WITHOUT HEMORRHAGE: ICD-10-CM

## 2022-12-21 DIAGNOSIS — I82.611 SUPERFICIAL VENOUS THROMBOSIS OF ARM, RIGHT: ICD-10-CM

## 2022-12-21 DIAGNOSIS — D57.00 SICKLE CELL PAIN CRISIS (H): ICD-10-CM

## 2022-12-21 DIAGNOSIS — D57.1 HB-SS DISEASE WITHOUT CRISIS (H): ICD-10-CM

## 2022-12-21 RX ORDER — ONDANSETRON 8 MG/1
8 TABLET, FILM COATED ORAL EVERY 8 HOURS PRN
Qty: 30 TABLET | Refills: 1 | Status: SHIPPED | OUTPATIENT
Start: 2022-12-21 | End: 2024-01-18

## 2022-12-21 RX ORDER — ASPIRIN 81 MG/1
81 TABLET, CHEWABLE ORAL 2 TIMES DAILY
Qty: 60 TABLET | Refills: 11 | Status: SHIPPED | OUTPATIENT
Start: 2022-12-21 | End: 2023-03-10

## 2022-12-21 RX ORDER — OXYCODONE HYDROCHLORIDE 15 MG/1
15 TABLET ORAL EVERY 4 HOURS PRN
Qty: 40 TABLET | Refills: 0 | Status: SHIPPED | OUTPATIENT
Start: 2022-12-21 | End: 2022-12-29

## 2022-12-21 NOTE — TELEPHONE ENCOUNTER
Ondansetron Refill   Last prescribing provider: Dr Duncan     Last clinic visit date: 11/17/22 Patricia mantilla     Recommendations for requested medication (if none, N/A): Copied from chart note 11/17/22 Patricia Mantilla    ondansetron (ZOFRAN) 8 MG tablet Take 1 tablet (8 mg) by mouth every 8 hours as needed 30 tablet 1       Any other pertinent information (if none, N/A): N/A    Refilled: Y/N, if NO, why?

## 2022-12-21 NOTE — TELEPHONE ENCOUNTER
Pt called in to triage requesting IVF pain meds for back pain rated 10/10 x 1 days. Stated last took prn oxycodone at 6am this morning without relief. Denied any fevers, chills, cough, sob, chest pain, numbness or tingling or other symptoms not typical of sickle cell pain.   Pt's last infusion was 12/19/22, last clinic visit 11/17/22 Patricia Jose Rafael  with follow-up on 1/2/23 with Dr Duncan .   Patient states they do not have own ride and it will take  60 minutes long to get to cancer clinic.   Pt denied being out of home medications and needing any refills today.   Pt qualifies for sickle cell standing order protocol.  If you do not hear from the infusion center by 2pm then you will not be able to get in for an infusion today.   Please note, if you are late for your appt, you risk losing your infusion appt as it may delay another patient's infusion who arrived on time.     Has started to get migraines about 3-4 months ago they first started.   Gets migraines about 4 times per week.   Has been taking ibuprofen 800mg three times per day when she has a headache.   Has been using ice but nothing is really helping and the headaches keep her awake all night so she does not get to sleep until 5 or 6 am.   Has tries dark room and quiet along with ice with little help in headache intensity.   Vomits when gets headache and has some nausea with headache.   Denies eye pain with headache.    10:08 per Patricia Mantilla   If she s not doing Tylenol she should add that in every 6 hours (650 mg) until symptoms improve. She could also try Zofran for nausea. I see it on her medication list but am not sure if she uses this often. She can try a cool pack to her head as well if she can tolerate that, I wouldn t put anything super cold on though because that can make sickle cell pain worse. I see she saw neurology for headaches in the past and I feel like she should see them again because I don t believe they ultimately ended up examining her  or starting any specific migraine medication. I will place a referral. If she is having any associated speech issues, dizziness, weakness, visual changes or numbness/tingling in her extremities with the headaches she needs to go to the ED.    10:26 Call placed to Jennifer and went over above information. Needs refill  Of ondansetron since she has been using it since getting headaches, States she understands all of the instructions that were gone over with her.     2:40 No room in infusion today if pain is severe then go to ER or call back tomorrow to get on list for infusion.

## 2022-12-21 NOTE — TELEPHONE ENCOUNTER
Narcotic Refill Request    Medication(s) requested:  Oxycodone, hydrea and ASA  Person Requesting Refill:adelaide   What pain is the medication treating: Sickle Cell pain   How is the medication being taken?:Oxy takes every 4 hours   Does pt have enough for today? Yes   Is pain being adequately controlled on the current regimen?:  Yes   Experiencing any side effects from medication?:  No     Date of most recent appointment:  11/17/22 patricia Mantilla   Any No Show Visits:No   Next appointment:   1/2/23 Dr Duncan   Last fill date and by whom:  12/15/22 Patricia mantilla    Reviewed:  No access     Routed/Paged provider:  Patricia Mantilla

## 2022-12-22 ENCOUNTER — TELEPHONE (OUTPATIENT)
Dept: ONCOLOGY | Facility: CLINIC | Age: 22
End: 2022-12-22

## 2022-12-22 ENCOUNTER — INFUSION THERAPY VISIT (OUTPATIENT)
Dept: ONCOLOGY | Facility: CLINIC | Age: 22
End: 2022-12-22
Attending: PEDIATRICS
Payer: COMMERCIAL

## 2022-12-22 ENCOUNTER — PATIENT OUTREACH (OUTPATIENT)
Dept: CARE COORDINATION | Facility: CLINIC | Age: 22
End: 2022-12-22

## 2022-12-22 DIAGNOSIS — G81.10 SPASTIC HEMIPLEGIA, UNSPECIFIED ETIOLOGY, UNSPECIFIED LATERALITY (H): Primary | ICD-10-CM

## 2022-12-22 DIAGNOSIS — D57.00 SICKLE CELL PAIN CRISIS (H): ICD-10-CM

## 2022-12-22 PROCEDURE — 250N000011 HC RX IP 250 OP 636: Performed by: REGISTERED NURSE

## 2022-12-22 PROCEDURE — 96361 HYDRATE IV INFUSION ADD-ON: CPT

## 2022-12-22 PROCEDURE — 258N000003 HC RX IP 258 OP 636: Performed by: PEDIATRICS

## 2022-12-22 PROCEDURE — 96376 TX/PRO/DX INJ SAME DRUG ADON: CPT

## 2022-12-22 PROCEDURE — 96374 THER/PROPH/DIAG INJ IV PUSH: CPT

## 2022-12-22 RX ORDER — HEPARIN SODIUM,PORCINE 10 UNIT/ML
5 VIAL (ML) INTRAVENOUS
Status: DISCONTINUED | OUTPATIENT
Start: 2022-12-22 | End: 2022-12-22 | Stop reason: HOSPADM

## 2022-12-22 RX ORDER — HEPARIN SODIUM (PORCINE) LOCK FLUSH IV SOLN 100 UNIT/ML 100 UNIT/ML
5 SOLUTION INTRAVENOUS
Status: DISCONTINUED | OUTPATIENT
Start: 2022-12-22 | End: 2022-12-22 | Stop reason: HOSPADM

## 2022-12-22 RX ORDER — ONDANSETRON 8 MG/1
8 TABLET, ORALLY DISINTEGRATING ORAL
Status: DISCONTINUED | OUTPATIENT
Start: 2022-12-22 | End: 2022-12-22

## 2022-12-22 RX ORDER — HEPARIN SODIUM,PORCINE 10 UNIT/ML
5 VIAL (ML) INTRAVENOUS
Status: CANCELLED | OUTPATIENT
Start: 2023-01-01

## 2022-12-22 RX ORDER — DIPHENHYDRAMINE HCL 25 MG
25 CAPSULE ORAL
Status: CANCELLED
Start: 2023-01-01

## 2022-12-22 RX ORDER — ONDANSETRON 8 MG/1
8 TABLET, FILM COATED ORAL
Status: CANCELLED
Start: 2023-01-01

## 2022-12-22 RX ORDER — MORPHINE SULFATE 2 MG/ML
2 INJECTION, SOLUTION INTRAMUSCULAR; INTRAVENOUS
Status: CANCELLED
Start: 2023-01-01

## 2022-12-22 RX ORDER — HEPARIN SODIUM (PORCINE) LOCK FLUSH IV SOLN 100 UNIT/ML 100 UNIT/ML
5 SOLUTION INTRAVENOUS
Status: CANCELLED | OUTPATIENT
Start: 2023-01-01

## 2022-12-22 RX ORDER — DIPHENHYDRAMINE HCL 25 MG
25 CAPSULE ORAL
Status: DISCONTINUED | OUTPATIENT
Start: 2022-12-22 | End: 2022-12-22

## 2022-12-22 RX ORDER — MORPHINE SULFATE 2 MG/ML
2 INJECTION, SOLUTION INTRAMUSCULAR; INTRAVENOUS
Status: DISCONTINUED | OUTPATIENT
Start: 2022-12-22 | End: 2022-12-22 | Stop reason: HOSPADM

## 2022-12-22 RX ADMIN — MORPHINE SULFATE 2 MG: 2 INJECTION, SOLUTION INTRAMUSCULAR; INTRAVENOUS at 11:42

## 2022-12-22 RX ADMIN — MORPHINE SULFATE 2 MG: 2 INJECTION, SOLUTION INTRAMUSCULAR; INTRAVENOUS at 10:48

## 2022-12-22 RX ADMIN — DEXTROSE AND SODIUM CHLORIDE 1000 ML: 5; 450 INJECTION, SOLUTION INTRAVENOUS at 10:01

## 2022-12-22 RX ADMIN — MORPHINE SULFATE 2 MG: 2 INJECTION, SOLUTION INTRAMUSCULAR; INTRAVENOUS at 10:02

## 2022-12-22 NOTE — TELEPHONE ENCOUNTER
Pt called in to triage requesting IVF pain meds for all over/generalized pain rated 10/10 x 2 days. Stated last took prn oxcodone at 0600 this morning without relief. Denied any fevers, chills, cough, sob, chest pain, numbness or tingling or other symptoms not typical of sickle cell pain.   Pt's last infusion was 12/19/22, last clinic visit 11/17/22 with follow-up on 01/02/22 with Dr. Duncan.   Patient states needs ride, 25 minutes.  Pt denied being out of home medications and needing any refills today.   Pt qualifies for sickle cell standing order protocol.  If you do not hear from the infusion center by 2pm then you will not be able to get in for an infusion today.     Added to Infusion Wait list.    [employer] can offer apt at 1030  Called Jennifer and she confirmed she can come to clinic at that time.  Messages sent to [employer] Charge nurse to update her; CCOD to request scheduling, and SW to request assistance with arranging transportation.     Routed to Care Team.

## 2022-12-22 NOTE — PROGRESS NOTES
SW received referral to assist with transportation coordination. SW called [employer] and arranged ride for patient. SW informed patient and clinic of ride information [employer] will  patient. Patient will call [employer] for return ride when appointment is complete. SW will remain available as needed.         Corry GONZALEZ, [facility]  - Oncology  Phone : [phone]  Pager: [phone]

## 2022-12-22 NOTE — PROGRESS NOTES
Infusion Nursing Note:  Jennifer Cervantes presents today for IVF-Pain Meds.    Patient seen by provider today: No   present during visit today: Not Applicable.    Note:   -generalized pain rated 10/10 x 2 days  -last took prn oxcodone at 0600 this morning without relief  -did not sleep all night  -Denies any fevers, chills, cough, sob, chest pain, numbness or tingling or other symptoms not typical of sickle cell pain    Desferal CADD pump running when pt arrived (runs over 48 hours).  Pt requesting to stop pump for IVF-Pain medication administration.    Desferal CADD pump restarted post IVF infusion.    4/10 generalized pain after last dose of Morphine, pt feels ok to discharge home.    Intravenous Access:  Implanted Port.  LEFT INTACT.    Treatment Conditions:  Not Applicable.      Post Infusion Assessment:  Patient tolerated infusion without incident.  Blood return noted pre and post infusion.  Site patent and intact, free from redness, edema or discomfort.  No evidence of extravasations.       Discharge Plan:   Prescription refills given for Zofran, ASA, Hydrea, Oxycodone Desferal.  Discharge instructions reviewed with: Patient.  Patient and/or family verbalized understanding of discharge instructions and all questions answered.  AVS to patient via [employer].  Patient will return 1/2 for provider visit/IVF.  Aware to contact Triage if SCC worsens.   Patient discharged in stable condition accompanied by: self.  Departure Mode: Ambulatory.    Patricia Coronado RN

## 2022-12-23 ENCOUNTER — APPOINTMENT (OUTPATIENT)
Dept: GENERAL RADIOLOGY | Facility: CLINIC | Age: 22
End: 2022-12-23
Attending: EMERGENCY MEDICINE
Payer: COMMERCIAL

## 2022-12-23 ENCOUNTER — HOSPITAL ENCOUNTER (EMERGENCY)
Facility: CLINIC | Age: 22
Discharge: HOME | End: 2022-12-24
Attending: EMERGENCY MEDICINE | Admitting: EMERGENCY MEDICINE
Payer: COMMERCIAL

## 2022-12-23 DIAGNOSIS — R50.9 FEVER, UNSPECIFIED FEVER CAUSE: ICD-10-CM

## 2022-12-23 DIAGNOSIS — D57.00 SICKLE CELL DISEASE WITH CRISIS (H): ICD-10-CM

## 2022-12-23 LAB
ALBUMIN SERPL BCG-MCNC: 4.6 G/DL (ref 3.5–5.2)
ALBUMIN UR-MCNC: NEGATIVE MG/DL
ALP SERPL-CCNC: 91 U/L (ref 35–104)
ALT SERPL W P-5'-P-CCNC: 47 U/L (ref 10–35)
ANION GAP SERPL CALCULATED.3IONS-SCNC: 15 MMOL/L (ref 7–15)
APPEARANCE UR: CLEAR
AST SERPL W P-5'-P-CCNC: 65 U/L (ref 10–35)
BACTERIA #/AREA URNS HPF: ABNORMAL /HPF
BASOPHILS # BLD MANUAL: 0.2 10E3/UL (ref 0–0.2)
BASOPHILS NFR BLD MANUAL: 2 %
BILIRUB SERPL-MCNC: 4.1 MG/DL
BILIRUB UR QL STRIP: NEGATIVE
BUN SERPL-MCNC: 5.3 MG/DL (ref 6–20)
CALCIUM SERPL-MCNC: 9.5 MG/DL (ref 8.6–10)
CHLORIDE SERPL-SCNC: 103 MMOL/L (ref 98–107)
COLOR UR AUTO: ABNORMAL
CREAT SERPL-MCNC: 0.57 MG/DL (ref 0.51–0.95)
DEPRECATED HCO3 PLAS-SCNC: 17 MMOL/L (ref 22–29)
EOSINOPHIL # BLD MANUAL: 0.2 10E3/UL (ref 0–0.7)
EOSINOPHIL NFR BLD MANUAL: 2 %
ERYTHROCYTE [DISTWIDTH] IN BLOOD BY AUTOMATED COUNT: 25.9 % (ref 10–15)
FLUAV RNA SPEC QL NAA+PROBE: NEGATIVE
FLUBV RNA RESP QL NAA+PROBE: NEGATIVE
GFR SERPL CREATININE-BSD FRML MDRD: >90 ML/MIN/1.73M2
GLUCOSE SERPL-MCNC: 88 MG/DL (ref 70–99)
GLUCOSE UR STRIP-MCNC: NEGATIVE MG/DL
HCG SERPL QL: NEGATIVE
HCT VFR BLD AUTO: 21.8 % (ref 35–47)
HGB BLD-MCNC: 7.5 G/DL (ref 11.7–15.7)
HGB UR QL STRIP: ABNORMAL
INR PPP: 1.25 (ref 0.85–1.15)
KETONES UR STRIP-MCNC: NEGATIVE MG/DL
LACTATE SERPL-SCNC: 0.8 MMOL/L (ref 0.7–2)
LEUKOCYTE ESTERASE UR QL STRIP: NEGATIVE
LYMPHOCYTES # BLD MANUAL: 1.6 10E3/UL (ref 0.8–5.3)
LYMPHOCYTES NFR BLD MANUAL: 18 %
MCH RBC QN AUTO: 30.4 PG (ref 26.5–33)
MCHC RBC AUTO-ENTMCNC: 34.4 G/DL (ref 31.5–36.5)
MCV RBC AUTO: 88 FL (ref 78–100)
MONOCYTES # BLD MANUAL: 1.2 10E3/UL (ref 0–1.3)
MONOCYTES NFR BLD MANUAL: 13 %
MUCOUS THREADS #/AREA URNS LPF: PRESENT /LPF
NEUTROPHILS # BLD MANUAL: 5.8 10E3/UL (ref 1.6–8.3)
NEUTROPHILS NFR BLD MANUAL: 65 %
NITRATE UR QL: NEGATIVE
NRBC # BLD AUTO: 1.1 10E3/UL
NRBC BLD MANUAL-RTO: 12 %
PH UR STRIP: 5.5 [PH] (ref 5–7)
PLAT MORPH BLD: ABNORMAL
PLATELET # BLD AUTO: 353 10E3/UL (ref 150–450)
POLYCHROMASIA BLD QL SMEAR: ABNORMAL
POTASSIUM SERPL-SCNC: 3.7 MMOL/L (ref 3.4–5.3)
PROT SERPL-MCNC: 7.8 G/DL (ref 6.4–8.3)
RBC # BLD AUTO: 2.47 10E6/UL (ref 3.8–5.2)
RBC MORPH BLD: ABNORMAL
RBC URINE: <1 /HPF
RETICS # AUTO: 0.52 10E6/UL (ref 0.03–0.1)
RETICS/RBC NFR AUTO: 21.2 % (ref 0.5–2)
RSV RNA SPEC NAA+PROBE: NEGATIVE
SARS-COV-2 RNA RESP QL NAA+PROBE: NEGATIVE
SICKLE CELLS BLD QL SMEAR: ABNORMAL
SODIUM SERPL-SCNC: 135 MMOL/L (ref 136–145)
SP GR UR STRIP: 1.01 (ref 1–1.03)
SQUAMOUS EPITHELIAL: 1 /HPF
TARGETS BLD QL SMEAR: ABNORMAL
TRANSITIONAL EPI: <1 /HPF
TROPONIN T SERPL HS-MCNC: <6 NG/L
UROBILINOGEN UR STRIP-MCNC: NORMAL MG/DL
WBC # BLD AUTO: 8.9 10E3/UL (ref 4–11)
WBC URINE: 1 /HPF

## 2022-12-23 PROCEDURE — 99285 EMERGENCY DEPT VISIT HI MDM: CPT | Mod: CS | Performed by: EMERGENCY MEDICINE

## 2022-12-23 PROCEDURE — 87040 BLOOD CULTURE FOR BACTERIA: CPT | Mod: 91 | Performed by: EMERGENCY MEDICINE

## 2022-12-23 PROCEDURE — 71046 X-RAY EXAM CHEST 2 VIEWS: CPT

## 2022-12-23 PROCEDURE — C9803 HOPD COVID-19 SPEC COLLECT: HCPCS | Performed by: EMERGENCY MEDICINE

## 2022-12-23 PROCEDURE — 85610 PROTHROMBIN TIME: CPT | Performed by: EMERGENCY MEDICINE

## 2022-12-23 PROCEDURE — 84484 ASSAY OF TROPONIN QUANT: CPT | Performed by: EMERGENCY MEDICINE

## 2022-12-23 PROCEDURE — 83605 ASSAY OF LACTIC ACID: CPT | Performed by: EMERGENCY MEDICINE

## 2022-12-23 PROCEDURE — 81001 URINALYSIS AUTO W/SCOPE: CPT | Performed by: EMERGENCY MEDICINE

## 2022-12-23 PROCEDURE — 80053 COMPREHEN METABOLIC PANEL: CPT | Performed by: EMERGENCY MEDICINE

## 2022-12-23 PROCEDURE — 84703 CHORIONIC GONADOTROPIN ASSAY: CPT | Performed by: EMERGENCY MEDICINE

## 2022-12-23 PROCEDURE — 85045 AUTOMATED RETICULOCYTE COUNT: CPT | Performed by: EMERGENCY MEDICINE

## 2022-12-23 PROCEDURE — 82040 ASSAY OF SERUM ALBUMIN: CPT | Performed by: EMERGENCY MEDICINE

## 2022-12-23 PROCEDURE — 36415 COLL VENOUS BLD VENIPUNCTURE: CPT | Performed by: EMERGENCY MEDICINE

## 2022-12-23 PROCEDURE — 87637 SARSCOV2&INF A&B&RSV AMP PRB: CPT | Performed by: EMERGENCY MEDICINE

## 2022-12-23 PROCEDURE — 96361 HYDRATE IV INFUSION ADD-ON: CPT | Performed by: EMERGENCY MEDICINE

## 2022-12-23 PROCEDURE — 258N000003 HC RX IP 258 OP 636: Performed by: EMERGENCY MEDICINE

## 2022-12-23 PROCEDURE — 85007 BL SMEAR W/DIFF WBC COUNT: CPT | Performed by: EMERGENCY MEDICINE

## 2022-12-23 PROCEDURE — 96375 TX/PRO/DX INJ NEW DRUG ADDON: CPT | Performed by: EMERGENCY MEDICINE

## 2022-12-23 PROCEDURE — 85027 COMPLETE CBC AUTOMATED: CPT | Performed by: EMERGENCY MEDICINE

## 2022-12-23 PROCEDURE — 250N000009 HC RX 250: Performed by: EMERGENCY MEDICINE

## 2022-12-23 PROCEDURE — 71046 X-RAY EXAM CHEST 2 VIEWS: CPT | Mod: 26 | Performed by: RADIOLOGY

## 2022-12-23 RX ADMIN — SODIUM CHLORIDE, POTASSIUM CHLORIDE, SODIUM LACTATE AND CALCIUM CHLORIDE 1000 ML: 600; 310; 30; 20 INJECTION, SOLUTION INTRAVENOUS at 21:46

## 2022-12-23 RX ADMIN — Medication 4 MG: at 21:46

## 2022-12-23 ASSESSMENT — ENCOUNTER SYMPTOMS
COUGH: 0
RESPIRATORY NEGATIVE: 1
NECK PAIN: 0
GASTROINTESTINAL NEGATIVE: 1
MYALGIAS: 1
TROUBLE SWALLOWING: 0
HEADACHES: 0
FLANK PAIN: 0
NECK STIFFNESS: 0
NAUSEA: 0
ABDOMINAL PAIN: 0
SHORTNESS OF BREATH: 0
DIARRHEA: 0
ARTHRALGIAS: 0
EYES NEGATIVE: 1
JOINT SWELLING: 0
BACK PAIN: 1
VOMITING: 0
SORE THROAT: 0

## 2022-12-23 ASSESSMENT — ACTIVITIES OF DAILY LIVING (ADL)
ADLS_ACUITY_SCORE: 35
ADLS_ACUITY_SCORE: 35

## 2022-12-24 VITALS
WEIGHT: 165 LBS | BODY MASS INDEX: 28.17 KG/M2 | DIASTOLIC BLOOD PRESSURE: 68 MMHG | RESPIRATION RATE: 18 BRPM | TEMPERATURE: 98.1 F | HEIGHT: 64 IN | OXYGEN SATURATION: 98 % | SYSTOLIC BLOOD PRESSURE: 136 MMHG | HEART RATE: 90 BPM

## 2022-12-24 PROCEDURE — 96367 TX/PROPH/DG ADDL SEQ IV INF: CPT | Performed by: EMERGENCY MEDICINE

## 2022-12-24 PROCEDURE — 258N000003 HC RX IP 258 OP 636

## 2022-12-24 PROCEDURE — 96365 THER/PROPH/DIAG IV INF INIT: CPT | Performed by: EMERGENCY MEDICINE

## 2022-12-24 PROCEDURE — 96366 THER/PROPH/DIAG IV INF ADDON: CPT | Performed by: EMERGENCY MEDICINE

## 2022-12-24 PROCEDURE — 250N000011 HC RX IP 250 OP 636: Performed by: EMERGENCY MEDICINE

## 2022-12-24 PROCEDURE — 250N000011 HC RX IP 250 OP 636

## 2022-12-24 RX ORDER — HEPARIN SODIUM (PORCINE) LOCK FLUSH IV SOLN 100 UNIT/ML 100 UNIT/ML
100 SOLUTION INTRAVENOUS ONCE
Status: COMPLETED | OUTPATIENT
Start: 2022-12-24 | End: 2022-12-24

## 2022-12-24 RX ORDER — MORPHINE SULFATE 2 MG/ML
2 INJECTION, SOLUTION INTRAMUSCULAR; INTRAVENOUS ONCE
Status: COMPLETED | OUTPATIENT
Start: 2022-12-24 | End: 2022-12-24

## 2022-12-24 RX ORDER — PIPERACILLIN SODIUM, TAZOBACTAM SODIUM 4; .5 G/20ML; G/20ML
4.5 INJECTION, POWDER, LYOPHILIZED, FOR SOLUTION INTRAVENOUS ONCE
Status: COMPLETED | OUTPATIENT
Start: 2022-12-24 | End: 2022-12-24

## 2022-12-24 RX ADMIN — Medication 100 UNITS: at 03:30

## 2022-12-24 RX ADMIN — PIPERACILLIN SODIUM AND TAZOBACTAM SODIUM 4.5 G: 4; .5 INJECTION, POWDER, LYOPHILIZED, FOR SOLUTION INTRAVENOUS at 00:55

## 2022-12-24 RX ADMIN — MORPHINE SULFATE 2 MG: 2 INJECTION, SOLUTION INTRAMUSCULAR; INTRAVENOUS at 00:55

## 2022-12-24 RX ADMIN — VANCOMYCIN HYDROCHLORIDE 1500 MG: 10 INJECTION, POWDER, LYOPHILIZED, FOR SOLUTION INTRAVENOUS at 01:42

## 2022-12-24 ASSESSMENT — ACTIVITIES OF DAILY LIVING (ADL)
ADLS_ACUITY_SCORE: 35
ADLS_ACUITY_SCORE: 35

## 2022-12-24 ASSESSMENT — ENCOUNTER SYMPTOMS
HEMATURIA: 0
WEAKNESS: 0
DYSURIA: 0
FEVER: 1
FATIGUE: 0
FREQUENCY: 0

## 2022-12-24 NOTE — ED NOTES
Emergency Department Patient Sign-out       Brief HPI:  This is a 22 year old female signed out to me by Dr. Miramontes .  See initial ED Provider note for details of the presentation.            Significant Events prior to my assuming care: Patient here with fever, has indwelling port. Covid/flu negative. Patient states she wants to go home initially.       Exam:   Patient Vitals for the past 24 hrs:   BP Temp Temp src Pulse Resp SpO2   12/23/22 2306 132/77 98  F (36.7  C) Oral -- -- --   12/23/22 2150 -- -- -- 104 -- 91 %   12/23/22 2148 (!) 136/90 -- -- 102 -- --   12/23/22 2010 122/77 (!) 103  F (39.4  C) Oral 103 16 94 %           ED RESULTS:   Results for orders placed or performed during the hospital encounter of 12/23/22 (from the past 24 hour(s))   CBC with platelets differential     Status: Abnormal    Collection Time: 12/23/22  9:05 PM    Narrative    The following orders were created for panel order CBC with platelets differential.  Procedure                               Abnormality         Status                     ---------                               -----------         ------                     CBC with platelets and d...[[id number]]  Abnormal            Final result               Manual Differential[[id number]]          Abnormal            Final result                 Please view results for these tests on the individual orders.   INR     Status: Abnormal    Collection Time: 12/23/22  9:05 PM   Result Value Ref Range    INR 1.25 (H) 0.85 - 1.15   Comprehensive metabolic panel     Status: Abnormal    Collection Time: 12/23/22  9:05 PM   Result Value Ref Range    Sodium 135 (L) 136 - 145 mmol/L    Potassium 3.7 3.4 - 5.3 mmol/L    Chloride 103 98 - 107 mmol/L    Carbon Dioxide (CO2) 17 (L) 22 - 29 mmol/L    Anion Gap 15 7 - 15 mmol/L    Urea Nitrogen 5.3 (L) 6.0 - 20.0 mg/dL    Creatinine 0.57 0.51 - 0.95 mg/dL    Calcium 9.5 8.6 - 10.0 mg/dL    Glucose 88 70 - 99 mg/dL    Alkaline Phosphatase 91  35 - 104 U/L    AST 65 (H) 10 - 35 U/L    ALT 47 (H) 10 - 35 U/L    Protein Total 7.8 6.4 - 8.3 g/dL    Albumin 4.6 3.5 - 5.2 g/dL    Bilirubin Total 4.1 (H) <=1.2 mg/dL    GFR Estimate >90 >60 mL/min/1.73m2   Lactic acid whole blood     Status: Normal    Collection Time: 12/23/22  9:05 PM   Result Value Ref Range    Lactic Acid 0.8 0.7 - 2.0 mmol/L   Reticulocyte count     Status: Abnormal    Collection Time: 12/23/22  9:05 PM   Result Value Ref Range    % Reticulocyte 21.2 (H) 0.5 - 2.0 %    Absolute Reticulocyte 0.524 (H) 0.025 - 0.095 10e6/uL   HCG qualitative Blood     Status: Normal    Collection Time: 12/23/22  9:05 PM   Result Value Ref Range    hCG Serum Qualitative Negative Negative   Troponin T, High Sensitivity     Status: Normal    Collection Time: 12/23/22  9:05 PM   Result Value Ref Range    Troponin T, High Sensitivity <6 <=14 ng/L   CBC with platelets and differential     Status: Abnormal    Collection Time: 12/23/22  9:05 PM   Result Value Ref Range    WBC Count 8.9 4.0 - 11.0 10e3/uL    RBC Count 2.47 (L) 3.80 - 5.20 10e6/uL    Hemoglobin 7.5 (L) 11.7 - 15.7 g/dL    Hematocrit 21.8 (L) 35.0 - 47.0 %    MCV 88 78 - 100 fL    MCH 30.4 26.5 - 33.0 pg    MCHC 34.4 31.5 - 36.5 g/dL    RDW 25.9 (H) 10.0 - 15.0 %    Platelet Count 353 150 - 450 10e3/uL   Manual Differential     Status: Abnormal    Collection Time: 12/23/22  9:05 PM   Result Value Ref Range    % Neutrophils 65 %    % Lymphocytes 18 %    % Monocytes 13 %    % Eosinophils 2 %    % Basophils 2 %    NRBCs per 100 WBC 12 (H) <=0 %    Absolute Neutrophils 5.8 1.6 - 8.3 10e3/uL    Absolute Lymphocytes 1.6 0.8 - 5.3 10e3/uL    Absolute Monocytes 1.2 0.0 - 1.3 10e3/uL    Absolute Eosinophils 0.2 0.0 - 0.7 10e3/uL    Absolute Basophils 0.2 0.0 - 0.2 10e3/uL    Absolute NRBCs 1.1 (H) <=0.0 10e3/uL    RBC Morphology Confirmed RBC Indices     Platelet Assessment  Automated Count Confirmed. Platelet morphology is normal.     Automated Count Confirmed.  Platelet morphology is normal.    Polychromasia Moderate (A) None Seen    Sickle Cells Marked (A) None Seen    Target Cells Moderate (A) None Seen   Symptomatic Influenza A/B & SARS-CoV2 (COVID-19) Virus PCR Multiplex Nasopharyngeal     Status: Normal    Collection Time: 12/23/22  9:40 PM    Specimen: Nasopharyngeal; Swab   Result Value Ref Range    Influenza A PCR Negative Negative    Influenza B PCR Negative Negative    RSV PCR Negative Negative    SARS CoV2 PCR Negative Negative    Narrative    Testing was performed using the Xpert Xpress CoV2/Flu/RSV Assay on the Cepheid GeneXpert Instrument. This test should be ordered for the detection of SARS-CoV-2 and influenza viruses in individuals who meet clinical and/or epidemiological criteria. Test performance is unknown in asymptomatic patients. This test is for in vitro diagnostic use under the FDA EUA for laboratories certified under CLIA to perform high or moderate complexity testing. This test has not been FDA cleared or approved. A negative result does not rule out the presence of PCR inhibitors in the specimen or target RNA in concentration below the limit of detection for the assay. If only one viral target is positive but coinfection with multiple targets is suspected, the sample should be re-tested with another FDA cleared, approved, or authorized test, if coinfection would change clinical management. This test was validated by the [facility]. These laboratories are certified under the Clinical Laboratory Improvement Amendments of 1988 (CLIA-88) as qualified to perform high complexity laboratory testing.   UA with Microscopic reflex to Culture     Status: Abnormal    Collection Time: 12/23/22 11:11 PM    Specimen: Urine, Midstream   Result Value Ref Range    Color Urine Orange (A) Colorless, Straw, Light Yellow, Yellow    Appearance Urine Clear Clear    Glucose Urine Negative Negative mg/dL    Bilirubin Urine Negative Negative    Ketones  Urine Negative Negative mg/dL    Specific Gravity Urine 1.012 1.003 - 1.035    Blood Urine Trace (A) Negative    pH Urine 5.5 5.0 - 7.0    Protein Albumin Urine Negative Negative mg/dL    Urobilinogen Urine Normal Normal, 2.0 mg/dL    Nitrite Urine Negative Negative    Leukocyte Esterase Urine Negative Negative    Bacteria Urine Few (A) None Seen /HPF    Mucus Urine Present (A) None Seen /LPF    RBC Urine <1 <=2 /HPF    WBC Urine 1 <=5 /HPF    Squamous Epithelials Urine 1 <=1 /HPF    Transitional Epithelials Urine <1 <=1 /HPF    Narrative    Urine Culture not indicated   XR Chest 2 Views     Status: None    Collection Time: 12/23/22 11:22 PM    Narrative    EXAM: XR CHEST 2 VIEWS  LOCATION: [facility]  DATE/TIME: 12/23/2022 11:22 PM    INDICATION: ? PNA or cause for fever (or findigns for acute chest syndrome)  COMPARISON: Study dated 11/27/2022      Impression    IMPRESSION: The cardiac silhouette is unchanged in size and contour. The implanted port is unchanged in position. There is a 1.1 cm nodule overlying the left seventh rib posteriorly which is new from prior exams, and may reflect postinflammatory scarring   versus underlying pulmonary nodule.       ED MEDICATIONS:   Medications   morphine (PF) injection 2 mg (has no administration in time range)   lactated ringers BOLUS 1,000 mL (1,000 mLs Intravenous New Bag 12/23/22 2146)   ketamine (KETALAR) injection 4 mg (4 mg Intravenous Given 12/23/22 2146)         Impression:    ICD-10-CM    1. Fever, unspecified fever cause  R50.9       2. Sickle cell disease with crisis (H)  D57.00           Plan:    Patient is reevaluated.  She says she wants to go home.  She knows the risks of going home regarding fever and unclear source.  She is otherwise asymptomatic.  No signs of acute chest clinically or on exam.  She is aware she has a chance of decompensating if she goes home but will return if she starts to feel ill.  She  will be called for positive cultures.  Pain is well controlled.        MD José Antonio Turner Matthew Joseph, MD  12/24/22 0157

## 2022-12-24 NOTE — ED PROVIDER NOTES
[place] [facility]ERGENCY DEPARTMENT ([facility])    12/23/22       ED PROVIDER NOTE   ED 20   History     Chief Complaint   Patient presents with     Sickle Cell Pain Crisis     The history is provided by the patient and medical records.     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    RECENT HISTORY REVIEW:  Patient does have a known history of sickle cell disease with related pain.  Has an ED care plan in place (see EMR for further details on such).  Also has ability to go to infusion center couple of times a week.  Also recently seen at infusion clinic yesterday, had infusion of morphine x 3 doses and 1L 5% dextrose and 0.45% sodium chloride injection. She also has home oxycodone regimen.  Says that she actually does better with the ketamine that she receives as part of her ED care plan, but they do not do that at the infusion center and so only receive such at the ED.  Per [facility] records she hasn't had COVID-19 vaccines or flu vaccine.    CURRENT PRESENTATION:  Patient reports that she has been feeling well and at her normal state of health until around noon today.  She reports that around that time, she began having some diffuse body aches, but worse in the diffuse low back.  She says this low back area is where she usually has pain with her sickle cell crises, noting that she felt somewhat unwell like when she is going to be sick or have an infection but without any particular symptoms to go along with such.  No headaches, vision or hearing symptoms, no sore throat, trouble swallowing, congestion or other  HEENT symptoms.  She has a port in place in the left upper chest but has not noticed any changes there.  No chest pain, cough, shortness of breath or other respiratory/thoracic symptoms.  No abdominal pain, no nausea or vomiting, no diarrhea or change to bowel or bladder habits.  No  symptoms.  No neck pain or stiffness, no midline back discomfort.  She again reports that the back pain is throughout the whole of the low back, not worse in any particular side, not notably in the midline.  No traumas or falls.  No changes from when she usually has sickle cell discomfort in this area (no new features, quality to pain, location, etc.).  No particular unilateral, flank or CVA area type pain.  No new incontinence.  Denies any symptoms that would go along with saddle anesthesia (even when described).  Has some residual weakness on the right from prior CVA, reports that is at baseline, not worse in any way, no new numbness, tingling or focal weakness.  No joint pain or swelling or other extremity symptoms.  No rashes or skin changes.  Nothing that she thinks would be clear indication for her fever-like findings.  No known contacts.  No other new symptoms or complaints this time.  Please see ROS for further details.    This part of the document was transcribed by Bambi Tejeda, Medical Scribe.      I have reviewed the Medications, Allergies, Past Medical and Surgical History, and Social History in the [facility] system.  Past Medical History:   Diagnosis Date     Anxiety      Bleeding disorder (H)      Blood clotting disorder (H)      Cerebral infarction (H) 2015     Cognitive developmental delay     low IQ. Please recognize when managing pain and planning with her     Depressive disorder      Hemiplegia and hemiparesis following cerebral infarction affecting right dominant side (H)     right hand contractures     Iron overload due to repeated red blood cell transfusions      Migraines      Multiple subsegmental pulmonary  emboli without acute cor pulmonale (H) 02/01/2021     Oppositional defiant behavior      Presence of intrauterine contraceptive device 2/18/2020     Superficial venous thrombosis of arm, right 03/25/2021     Uncomplicated asthma        Past Surgical History:   Procedure Laterality Date     AS INSERT TUNNELED CV 2 CATH W/O PORT/PUMP       CHOLECYSTECTOMY       CV RIGHT HEART CATH MEASUREMENTS RECORDED N/A 11/18/2021    Procedure: Right Heart Cath;  Surgeon: Jackson Stauffer MD;  Location:  HEART CARDIAC CATH LAB     INSERT PORT VASCULAR ACCESS Left 4/21/2021    Procedure: INSERTION, VASCULAR ACCESS PORT (NOT SURE ON SIDE UNTIL REMOVAL);  Surgeon: Rajan More MD;  Location: [facility] OR     IR CHEST PORT PLACEMENT > 5 YRS OF AGE  4/21/2021     IR CVC NON TUNNEL LINE REMOVAL  5/6/2021     IR CVC NON TUNNEL PLACEMENT > 5 YRS  04/07/2020     IR CVC NON TUNNEL PLACEMENT > 5 YRS  4/30/2021     IR CVC NON TUNNEL PLACEMENT > 5 YRS  9/7/2022     IR PORT REMOVAL LEFT  4/21/2021     REMOVE PORT VASCULAR ACCESS Left 4/21/2021    Procedure: REMOVAL, VASCULAR ACCESS PORT LEFT;  Surgeon: Rajan More MD;  Location: [facility] OR     REPAIR TENDON ELBOW Right 10/02/2019    Procedure: Right Elbow Flexor Lengthening, Flexor Pronator Slide Of Wrist and Finger, Thumb Adductor Lengthening;  Surgeon: Anai Franco MD;  Location: [facility] OR     TONSILLECTOMY Bilateral 10/02/2019    Procedure: Bilateral Tonsillectomy;  Surgeon: Farhana Guy MD;  Location: [facility] OR     ZZC BREAST REDUCTION (INCLUDES LIPO) TIER 3 Bilateral 04/23/2019     Past medical history, past surgical history, medications, and allergies were reviewed with the patient.     Family History   Problem Relation Age of Onset     Sickle Cell Trait Mother      Hypertension Mother      Asthma Mother      Sickle Cell Trait Father      Glaucoma No family hx of      Macular Degeneration No family hx of      Diabetes No family hx of      Gout No family hx of         Social History     Tobacco Use     Smoking status: Never     Smokeless tobacco: Never   Substance Use Topics     Alcohol use: Not Currently     Alcohol/week: 0.0 standard drinks      Social history was reviewed with the patient.     Review of Systems   Constitutional: Positive for fever. Negative for fatigue.   HENT: Negative.  Negative for congestion, hearing loss, sore throat and trouble swallowing.    Eyes: Negative.  Negative for visual disturbance.   Respiratory: Negative.  Negative for cough and shortness of breath.    Cardiovascular: Negative for chest pain.   Gastrointestinal: Negative.  Negative for abdominal pain, diarrhea, nausea and vomiting.   Genitourinary: Negative.  Negative for dysuria, flank pain, frequency, hematuria, urgency, vaginal bleeding, vaginal discharge and vaginal pain.   Musculoskeletal: Positive for back pain and myalgias (similar to prior sickle cell pain flare). Negative for arthralgias, joint swelling, neck pain and neck stiffness.   Skin: Negative.  Negative for rash.   Neurological: Negative for syncope, weakness and headaches.   Psychiatric/Behavioral: Negative for suicidal ideas.   All other systems reviewed and are negative.     A complete review of systems was performed with pertinent positives and negatives noted in the HPI, and all other systems negative.    Physical Exam   BP: 122/77  Pulse: 103  Temp: (!) 103  F (39.4  C)  Resp: 16  SpO2: 94 %    CONSTITUTIONAL: Well-developed and well-nourished. Awake and alert. Non-toxic appearance. No acute distress. Does have the temperature up to 103  F, with mild regular tachycardia but normotensive, SPO2 normal on RA, RR 16 and has normal work of breathing.  HENT:   - Head: Normocephalic and atraumatic.   - Ears: Hearing and external ear grossly normal.   - Nose: Nose normal. No rhinorrhea. No epistaxis.   - Mouth/Throat: MMM. No trismus or drooling.  No tongue elevation. Normal voice. Uvula is midline and nonedematous. No  exudates or lesions. No asymmetry. No obvious findings for PTA, RPA, epiglottitis or Clement's Angina.  No findings for koplick spots or other HEENT   EYES: Conjunctivae and lids are normal. No scleral icterus. No photophobia. No abnormal findings.  NECK: Normal range of motion and phonation normal. Neck supple.  No tracheal deviation, no stridor. No edema or erythema noted. No fullness or fluctuance. No stiffness/rigidity appreciated.  No obvious meningeal type findings.  CARDIOVASCULAR: Mild tachycardia, regular rhythm and no appreciable abnormal heart sounds.  PULMONARY/CHEST: Normal work of breathing. No accessory muscle usage or stridor. No respiratory distress.  No appreciable abnormal breath sounds. Port in place on left upper chest, no outward abnormal findings appreciated.  ABDOMEN: Soft, non-distended. No tenderness. No rigidity, rebound or guarding. No peritoneal findings, no palpable masses or abnormal pulsatility appreciated.  MUSCULOSKELETAL: Extremities warm and seemingly well perfused. No edema or calf tenderness.  Has the residual/chronic right-sided weakness but reportedly unchanged. No joint pain/swelling. No rashes/skin changes.   NEUROLOGIC: Awake, alert. Not disoriented.  Baseline right-sided weakness from prior hemiparesis, no saddle anesthesia type findings.   SKIN: Skin is warm and dry. No rash noted. No diaphoresis. No pallor.   PSYCHIATRIC: Normal mood and affect. Speech and behavior normal. Thought processes linear. Cognition and memory are normal.        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    Clinically, patient appears nontoxic, NAD.  Has the elevated temp and mild tachycardia, but despite this actually looks clinically fairly well on general observation.  Has a residual right-sided weakness which is unchanged from usual baseline.  No obvious HEENT findings.  No meningeal findings.  No obvious acute spine findings or new neurologic abnormalities.  Cardiopulmonary-wise no obvious acute findings, abdominal exam is benign and at least no outward findings at the port site.  No other acute findings on exam.    DDx includes, but not limited to, some sort of viral illness (as we are seeing a fair amount of COVID, influenza, etc. so certainly those are a possibility), additionally considered bacteremia given she has an indwelling line, PNA, UTI, etc. no findings for SSTI, PID or other  type infection, amongst others. Has some diffuse body pain in setting of the fever, as well as the diffuse low back pain. No particular midline back pain. No new neuro sx's. No sx's or findings for spinal cord compression. Think less likely discitis, epidural abscess, et al. As has had this similar pain in the past regularly w/ her sickle cell., , no findings for spinal cord compression, et al.     PLAN: Laboratory studies, urine studies, start with chest imaging, cultures  - Given how high her fever is and inability to manage her pain at home she may need to come in to medicine for further evaluation and management, but final disposition pending ED course  - Risks/benefits of pursuing imaging review and accepted.     RESULTS:  - Labs:   --- WBC is 8.9 (down from previous), Hgb 7.5 (also down from previous), Reticulocyte count 21.2%/absolute retic 0.524  --- Blood cultures pending  --- Troponin negative, hCG negative  --- Lactate normal at 0.8, CO2 slightly low at 17 minutes but that is where was previously, AST and ALT slightly up from previous,  "T bili also up  --- COVID, influenza, RSV negative  - Urine: No UTI  - Imaging: Written preliminary reports reviewed:  --- CXR: \"The cardiac silhouette is unchanged in size and contour.  The implanted port is unchanged in position. There is a 1.1 cm nodule overlying the left seventh rib posteriorly which is new from prior exams, and may reflect postinflammatory scarring versus underlying pulmonary nodule.\"  --- Results/reports reviewed w/ patient who expresses understanding of findings and F/U recommendations.Should she choose to discharge, will get evaluated more as outpatient, otherwise evaluate as inpatient should she choose to stay.     INTERVENTIONS:   - IV fluid  - IV ketamine (according to ED care plan), safety monitoring discussed with ED RN  - IV morphine  - IV Vanc, IV zosyn    RE-EVALUATION:  - The patient's symptoms were somewhat improved. Still no clear source for fever.   - Pt otherwise continues to do well here in the ED, no acute issues or apparent concerning changes in vitals or clinical appearance.    DISCUSSIONS:  - w/ IM Attending: Reviewed patient/presentation, current state of workup/any pending studies. Should patient be willing to stay (final decision pending at shift change), they will admit for further evaluation/management, F/U pending studies as needed, coordinate w/ consulting services as needed. No additional requests/recommendations for workup/management for in the ED at this time.   - w/ Patient: I have reviewed the available findings, options. Recommended Admission/observation given no source yet identified in setting of indwelling port and high fever. Unkown why has such a high fever. Needs further workup for fever and CXR findings. Patient was leaning towards wanting to be discharged, but wasn't yet sure, going to see how she feels/what she thinks with another round of pain medications. Reviewed our concerns, R/B/A should patient choose to leave and potential for missed/delayed " diagnosis/management and how that could lead to significant consequence, including but not limited to death/permanent disability.   --- Awake, alert, not intoxicated. Able to express understanding of concerns and potential risks, ask appropriate questions. Appears to have decision making capacity at this time.     SIGNOUT:  - Patient signed out to overnight EM Physician.  - Impression at shift change: Fever, unknown sournce  - Pending at shift change: Blood cultures, finalized imaging reports  - Tentative plan: Clinically reassess after pain meds. Still recommend admission should patient be willing (already discussed w/ IM attending who's willing to admit should patient agree.)      This part of the document was transcribed by Bambi Tejeda, Medical Scribe.    ______________________________________________________________________         RETA JOSEPH MD    12/23/2022   [facility] EMERGENCY DEPARTMENT     Reta Joseph MD  12/24/22 0057       Reta Joseph MD  12/24/22 0439

## 2022-12-24 NOTE — PHARMACY-VANCOMYCIN DOSING SERVICE
"Pharmacy Vancomycin Initial Note  Date of Service 2022  Patient's  2000  22 year old, female    Indication: Sepsis    Current estimated CrCl = Estimated Creatinine Clearance: 151.9 mL/min (based on SCr of 0.57 mg/dL).    Creatinine for last 3 days  2022:  9:05 PM Creatinine 0.57 mg/dL    Recent Vancomycin Level(s) for last 3 days  No results found for requested labs within last 72 hours.      Vancomycin IV Administrations (past 72 hours)      No vancomycin orders with administrations in past 72 hours.                Nephrotoxins and other renal medications (From now, onward)    Start     Dose/Rate Route Frequency Ordered Stop    22 0130  vancomycin (VANCOCIN) 1,500 mg in 0.9% NaCl 250 mL intermittent infusion         1,500 mg  over 90 Minutes Intravenous EVERY 12 HOURS 22 0100      22 0035  piperacillin-tazobactam (ZOSYN) 4.5 g vial to attach to  mL bag        Note to Pharmacy: For [facility], [facility] and [facility]: For Zosyn-naive patients, use the \"Zosyn initial dose + extended infusion\" order panel.    4.5 g  over 30 Minutes Intravenous ONCE 22 0031            Contrast Orders - past 72 hours (72h ago, onward)    None          [employer] Prediction of Planned Initial Vancomycin Regimen  Loading dose: N/A  Regimen: 1500 mg IV every 12 hours.  Start time: 00:59 on 2022  Exposure target: AUC24 (range)400-600 mg/L.hr   AUC24,ss: 552 mg/L.hr  Probability of AUC24 > 400: 81 %  Ctrough,ss: 15.5 mg/L  Probability of Ctrough,ss > 20: 32 %  Probability of nephrotoxicity (Lodise ANA LAURA ): 11 %          Plan:  1. Start vancomycin  1500 mg IV q12h.   2. Vancomycin monitoring method: AUC  3. Vancomycin therapeutic monitoring goal: 400-600 mg*h/L  4. Pharmacy will check vancomycin levels as appropriate in 1-3 Days.    5. Serum creatinine levels will be ordered daily for the first week of therapy and at least twice weekly for subsequent weeks.      Tyler Roberson, [facility]    "

## 2022-12-24 NOTE — ED TRIAGE NOTES
Complaints of sickle cell pain crisis that started around noon today. Tried home dose of Oxycodone and heat packs without relief. Pain is located in the back, which is typical of crisis. Denies chest pain or shortness of breath.     Triage Assessment     Row Name 12/23/22 2003       Triage Assessment (Adult)    Airway WDL WDL       Respiratory WDL    Respiratory WDL WDL       Skin Circulation/Temperature WDL    Skin Circulation/Temperature WDL WDL       Cardiac WDL    Cardiac WDL WDL       Peripheral/Neurovascular WDL    Peripheral Neurovascular WDL WDL       Cognitive/Neuro/Behavioral WDL    Cognitive/Neuro/Behavioral WDL WDL

## 2022-12-24 NOTE — DISCHARGE INSTRUCTIONS
Return to the emergency department if you develop worsening fevers, shortness of breath, chest pain, cough or for any further concerns.

## 2022-12-26 ENCOUNTER — TELEPHONE (OUTPATIENT)
Dept: ONCOLOGY | Facility: CLINIC | Age: 22
End: 2022-12-26

## 2022-12-26 ENCOUNTER — PATIENT OUTREACH (OUTPATIENT)
Dept: CARE COORDINATION | Facility: CLINIC | Age: 22
End: 2022-12-26

## 2022-12-26 ENCOUNTER — INFUSION THERAPY VISIT (OUTPATIENT)
Dept: TRANSPLANT | Facility: CLINIC | Age: 22
End: 2022-12-26
Payer: COMMERCIAL

## 2022-12-26 VITALS
DIASTOLIC BLOOD PRESSURE: 82 MMHG | SYSTOLIC BLOOD PRESSURE: 140 MMHG | RESPIRATION RATE: 16 BRPM | TEMPERATURE: 98.8 F | HEART RATE: 97 BPM | OXYGEN SATURATION: 93 %

## 2022-12-26 DIAGNOSIS — G81.10 SPASTIC HEMIPLEGIA, UNSPECIFIED ETIOLOGY, UNSPECIFIED LATERALITY (H): Primary | ICD-10-CM

## 2022-12-26 DIAGNOSIS — D57.00 SICKLE CELL PAIN CRISIS (H): ICD-10-CM

## 2022-12-26 PROCEDURE — 96361 HYDRATE IV INFUSION ADD-ON: CPT

## 2022-12-26 PROCEDURE — 250N000011 HC RX IP 250 OP 636: Performed by: REGISTERED NURSE

## 2022-12-26 PROCEDURE — 258N000003 HC RX IP 258 OP 636: Performed by: PEDIATRICS

## 2022-12-26 PROCEDURE — 96376 TX/PRO/DX INJ SAME DRUG ADON: CPT

## 2022-12-26 PROCEDURE — 96374 THER/PROPH/DIAG INJ IV PUSH: CPT

## 2022-12-26 RX ORDER — HEPARIN SODIUM (PORCINE) LOCK FLUSH IV SOLN 100 UNIT/ML 100 UNIT/ML
5 SOLUTION INTRAVENOUS
Status: CANCELLED | OUTPATIENT
Start: 2023-01-01

## 2022-12-26 RX ORDER — MORPHINE SULFATE 2 MG/ML
2 INJECTION, SOLUTION INTRAMUSCULAR; INTRAVENOUS
Status: CANCELLED
Start: 2023-01-01

## 2022-12-26 RX ORDER — DIPHENHYDRAMINE HCL 25 MG
25 CAPSULE ORAL
Status: CANCELLED
Start: 2023-01-01

## 2022-12-26 RX ORDER — ONDANSETRON 8 MG/1
8 TABLET, FILM COATED ORAL
Status: CANCELLED
Start: 2023-01-01

## 2022-12-26 RX ORDER — HEPARIN SODIUM,PORCINE 10 UNIT/ML
5 VIAL (ML) INTRAVENOUS
Status: CANCELLED | OUTPATIENT
Start: 2023-01-01

## 2022-12-26 RX ORDER — MORPHINE SULFATE 2 MG/ML
2 INJECTION, SOLUTION INTRAMUSCULAR; INTRAVENOUS
Status: COMPLETED | OUTPATIENT
Start: 2022-12-26 | End: 2022-12-26

## 2022-12-26 RX ADMIN — MORPHINE SULFATE 2 MG: 2 INJECTION, SOLUTION INTRAMUSCULAR; INTRAVENOUS at 15:12

## 2022-12-26 RX ADMIN — DEXTROSE AND SODIUM CHLORIDE 1000 ML: 5; 450 INJECTION, SOLUTION INTRAVENOUS at 13:04

## 2022-12-26 RX ADMIN — MORPHINE SULFATE 2 MG: 2 INJECTION, SOLUTION INTRAMUSCULAR; INTRAVENOUS at 13:06

## 2022-12-26 RX ADMIN — MORPHINE SULFATE 2 MG: 2 INJECTION, SOLUTION INTRAMUSCULAR; INTRAVENOUS at 14:10

## 2022-12-26 ASSESSMENT — PAIN SCALES - GENERAL: PAINLEVEL: SEVERE PAIN (7)

## 2022-12-26 NOTE — PROGRESS NOTES
Infusion Nursing Note:  Jennifer Cervantes presents today for IVF and pain meds.    Patient seen by provider today: No   present during visit today: Not Applicable.    Note: Jennifer here today with 8/10 pain to her lower back. Vitals/allergies/assessment completed. Pt received total 6mg IV morphine and 1L D51/2NS bolus. Tolerated infusion well, pain decreased to 4/10 on discharge.    Intravenous Access:  Implanted Port.    Treatment Conditions:  Not Applicable.    Post Infusion Assessment:  Patient tolerated infusion without incident.  Blood return noted pre and post infusion.  No evidence of extravasations.  Access discontinued per protocol.     Discharge Plan:   Patient discharged in stable condition accompanied by: self.  Departure Mode: Ambulatory.      Allison Bowman RN

## 2022-12-26 NOTE — TELEPHONE ENCOUNTER
Social Work Note: Transportation  Oncology Clinic     Data/Intervention:  Patient Name:  Jennifer Cervantes  /Age: 2000, 22 years old     Call From: [facility]        Reason for Call:  Transportation     Assessment:   called [employer] to arrange ride. [employer] arranged  for patient from home with a will call return ride.  Patient will need to call when ready for return ride home ([phone]).      Plan:  Patient is aware of the transportation plan.  available to assist with any other needs.      CARLOS Chavez,SW  Hematology/Oncology Social Worker  Phone:[phone] Pager: [phone]

## 2022-12-26 NOTE — TELEPHONE ENCOUNTER
Oncology Nurse Triage - Sickle Cell Pain Crisis:    Situation: Jennifer  calling about Sickle Cell Pain Crisis    Background:     Patient's last infusion was  12/23/22 ED for fever/pain  Last clinic visit date:11/17/22  Next follow-up appt on 1/2/2023 Dr. fernandez  Does patient have active treatment plan?  Yes    (If pt has been seen in ED or infusion in last 3 days or no showed last clinic visit then does not meet protocol unless specified in pt therapy plan)    Assessment of Symptoms:  Onset/Duration of symptoms: 1 day    Is it typical sickle cell pain? Yes   Location: lower back  Character: Sharp           Intensity: 8/10    Any radiation of pain, numbness, tingling, weakness, warmth, swelling, discoloration of extremities?No     Fever?No     Chest Pain Present: No     Shortness of breath: No     Other home therapies tried: HEAT/HEATING PAD     Last home medication taken and when: Oxycodone at 6am    Any Refills Needed?: Yes     Does patient have transportation & length of time to get to clinic: No       Grades for reference:     Grade 2 - Meets was in ED within past 3 days  o Temperature of over 100.0   o Different sickle cell pain   o Decreased PO intake   o Arm or left swelling   o ED or infusion visit within the last 3 days.   o Out of home medications    Recommendations:   0708 Paged Patricia Mantilla CNP.   0709 Approved by Patricia for IVF/Pain per therapy plan.     0714 Appt available for today at 1:00pm in B[facility] clinic, pt in agreement. Will call back by 9:30am if does not hear from  for transportation.     0718  and Scheduling notified.     Please note, if you are late for your appt, you risk losing your infusion appt as it may delay another patient's infusion who arrived on time.

## 2022-12-28 ENCOUNTER — LAB (OUTPATIENT)
Dept: LAB | Facility: CLINIC | Age: 22
End: 2022-12-28
Attending: PEDIATRICS
Payer: COMMERCIAL

## 2022-12-28 ENCOUNTER — TELEPHONE (OUTPATIENT)
Dept: ONCOLOGY | Facility: CLINIC | Age: 22
End: 2022-12-28

## 2022-12-28 ENCOUNTER — PATIENT OUTREACH (OUTPATIENT)
Dept: CARE COORDINATION | Facility: CLINIC | Age: 22
End: 2022-12-28

## 2022-12-28 DIAGNOSIS — R35.0 URINARY FREQUENCY: Primary | ICD-10-CM

## 2022-12-28 DIAGNOSIS — N30.00 ACUTE CYSTITIS WITHOUT HEMATURIA: Primary | ICD-10-CM

## 2022-12-28 DIAGNOSIS — R35.0 URINARY FREQUENCY: ICD-10-CM

## 2022-12-28 LAB
ALBUMIN UR-MCNC: NEGATIVE MG/DL
APPEARANCE UR: ABNORMAL
BACTERIA BLD CULT: NO GROWTH
BILIRUB UR QL STRIP: NEGATIVE
COLOR UR AUTO: ABNORMAL
GLUCOSE UR STRIP-MCNC: NEGATIVE MG/DL
HGB UR QL STRIP: NEGATIVE
KETONES UR STRIP-MCNC: NEGATIVE MG/DL
LEUKOCYTE ESTERASE UR QL STRIP: ABNORMAL
MUCOUS THREADS #/AREA URNS LPF: PRESENT /LPF
NITRATE UR QL: NEGATIVE
PH UR STRIP: 5.5 [PH] (ref 5–7)
RBC URINE: 2 /HPF
SP GR UR STRIP: 1.01 (ref 1–1.03)
SQUAMOUS EPITHELIAL: 3 /HPF
UROBILINOGEN UR STRIP-MCNC: 2 MG/DL
WBC URINE: 122 /HPF

## 2022-12-28 PROCEDURE — 81001 URINALYSIS AUTO W/SCOPE: CPT

## 2022-12-28 PROCEDURE — 87086 URINE CULTURE/COLONY COUNT: CPT

## 2022-12-28 RX ORDER — NITROFURANTOIN 25; 75 MG/1; MG/1
100 CAPSULE ORAL 2 TIMES DAILY
Qty: 10 CAPSULE | Refills: 0 | Status: SHIPPED | OUTPATIENT
Start: 2022-12-28 | End: 2023-01-02

## 2022-12-28 NOTE — TELEPHONE ENCOUNTER
Oncology Nurse Triage - Urinary Symptoms:    Treating Provider:   Dr Duncan and Patricia Mantilla     Date of last office visit: 11/17/22 Patricia Mantilla     Onset of symptoms: started in middle of night      Duration of symptoms: 1 day    Is patient in active treatment? (if yes, what drug): No    Labs:     Lab Results   Component Value Date/Time    WBC 8.9 12/23/2022 09:05 PM    WBC 15.8 (H) 07/10/2021 03:39 AM       History of UTIs? No    Assessment:     Fever: No    Urine Symptoms and Characteristics (check all that apply):   Increase in frequency in urination, Decreased urine output in urination  and feels like she has to go to bathroom every 5 minutes and nothing comes out. Last voided at 5amit was a large amount. Paper that she wipes with is wet every time she uses the bathroom.     Urine Color:   yellow and clear    Urine Odor:   none      Grades for reference:       Grade 1 :     o Afebrile, not neutropenic, adequate oral intake, and has not been treated for a urinary tract infection in the past month with one or more of the following:  o Dysuria  o Frequent urination  o Slow stream  o Dribbling  o Cloudy or malodorous urine  o Sense of incomplete voiding  o Urgency      Grade 2:     o Any grade 1 symptoms are present as well as one or more of the following:  o Hematuria  o Possible neutropenia  o Unable to urinate for more than 6 hours  o Suprapubic tenderness  o Moderate flank or back pain  o Recent treatment of a urinary tract infection  o Temperature greater than 100.3        Grade 3:     o Grade 1 and Grade 2 with any of the following:  o Severe flank or back pain  o Shaking chills  o Severe malaise  o Change in mental status      Recommendations:   Per Patricia Mantilla would like to do UA/UC.  Orders placed for same.     Call placed to Jennifer and let her know that she needs to come in to leave urine sample so it can be checked for infection. She wonders if there is anything else that can be done. Advised she  would have to come in for UA/UC or go to urgent care for them to do UA/UC on her. She will come in to [facility] to leave urine sample.

## 2022-12-28 NOTE — TELEPHONE ENCOUNTER
4:59 PM-Called Jennifer to let he know that her urine she dropped of today looked positive for UTI. A prescription for Macrobid is at the [facility] pharmacy. She will pick it up tomorrow.    Signature:  Nasrin Chicas RN

## 2022-12-28 NOTE — NURSING NOTE
Chief Complaint   Patient presents with     Labs Only     UA/UC collected in lab.      Jennifer Lai RN

## 2022-12-28 NOTE — TELEPHONE ENCOUNTER
Social Work Note: Transportation  Oncology Clinic     Data/Intervention:  Patient Name:  Jennifer Cervantes  /Age: 2000, 22 years old     Call From: [facility]        Reason for Call:  Transportation      Assessment:   called [facility] to arrange ride through patient's insurance. [facility] arranged  for patient from home with [employer]. Patient will need to call when ready for return ride home ([phone]).      Plan:  Patient is aware of the transportation plan.  available to assist with any other needs.      CARLOS Chavez,LGUDAY  Hematology/Oncology Social Worker  Phone:[phone] Pager: [phone]

## 2022-12-29 ENCOUNTER — INFUSION THERAPY VISIT (OUTPATIENT)
Dept: INFUSION THERAPY | Facility: CLINIC | Age: 22
End: 2022-12-29
Attending: PEDIATRICS
Payer: COMMERCIAL

## 2022-12-29 ENCOUNTER — TELEPHONE (OUTPATIENT)
Dept: ONCOLOGY | Facility: CLINIC | Age: 22
End: 2022-12-29

## 2022-12-29 VITALS
HEART RATE: 102 BPM | OXYGEN SATURATION: 93 % | RESPIRATION RATE: 16 BRPM | TEMPERATURE: 98.2 F | SYSTOLIC BLOOD PRESSURE: 137 MMHG | DIASTOLIC BLOOD PRESSURE: 83 MMHG

## 2022-12-29 DIAGNOSIS — G81.10 SPASTIC HEMIPLEGIA, UNSPECIFIED ETIOLOGY, UNSPECIFIED LATERALITY (H): Primary | ICD-10-CM

## 2022-12-29 DIAGNOSIS — D57.00 SICKLE CELL PAIN CRISIS (H): ICD-10-CM

## 2022-12-29 LAB — BACTERIA BLD CULT: NO GROWTH

## 2022-12-29 PROCEDURE — 250N000011 HC RX IP 250 OP 636: Performed by: REGISTERED NURSE

## 2022-12-29 PROCEDURE — 96376 TX/PRO/DX INJ SAME DRUG ADON: CPT

## 2022-12-29 PROCEDURE — 96374 THER/PROPH/DIAG INJ IV PUSH: CPT

## 2022-12-29 PROCEDURE — 96361 HYDRATE IV INFUSION ADD-ON: CPT

## 2022-12-29 PROCEDURE — 258N000003 HC RX IP 258 OP 636: Performed by: PEDIATRICS

## 2022-12-29 PROCEDURE — 250N000011 HC RX IP 250 OP 636: Performed by: PEDIATRICS

## 2022-12-29 RX ORDER — DIPHENHYDRAMINE HCL 25 MG
25 CAPSULE ORAL
Status: COMPLETED | OUTPATIENT
Start: 2022-12-29 | End: 2022-12-29

## 2022-12-29 RX ORDER — DIPHENHYDRAMINE HCL 25 MG
25 CAPSULE ORAL
Status: CANCELLED
Start: 2023-01-01

## 2022-12-29 RX ORDER — HEPARIN SODIUM (PORCINE) LOCK FLUSH IV SOLN 100 UNIT/ML 100 UNIT/ML
5 SOLUTION INTRAVENOUS
Status: CANCELLED | OUTPATIENT
Start: 2023-01-01

## 2022-12-29 RX ORDER — HEPARIN SODIUM (PORCINE) LOCK FLUSH IV SOLN 100 UNIT/ML 100 UNIT/ML
5 SOLUTION INTRAVENOUS
Status: DISCONTINUED | OUTPATIENT
Start: 2022-12-29 | End: 2022-12-29 | Stop reason: HOSPADM

## 2022-12-29 RX ORDER — MORPHINE SULFATE 2 MG/ML
2 INJECTION, SOLUTION INTRAMUSCULAR; INTRAVENOUS
Status: DISCONTINUED | OUTPATIENT
Start: 2022-12-29 | End: 2022-12-29 | Stop reason: HOSPADM

## 2022-12-29 RX ORDER — MORPHINE SULFATE 2 MG/ML
2 INJECTION, SOLUTION INTRAMUSCULAR; INTRAVENOUS
Status: CANCELLED
Start: 2023-01-01

## 2022-12-29 RX ORDER — HEPARIN SODIUM,PORCINE 10 UNIT/ML
5 VIAL (ML) INTRAVENOUS
Status: CANCELLED | OUTPATIENT
Start: 2023-01-01

## 2022-12-29 RX ORDER — ONDANSETRON 8 MG/1
8 TABLET, FILM COATED ORAL
Status: CANCELLED
Start: 2023-01-01

## 2022-12-29 RX ORDER — OXYCODONE HYDROCHLORIDE 15 MG/1
15 TABLET ORAL EVERY 4 HOURS PRN
Qty: 40 TABLET | Refills: 0 | Status: SHIPPED | OUTPATIENT
Start: 2022-12-29 | End: 2023-01-04

## 2022-12-29 RX ADMIN — MORPHINE SULFATE 2 MG: 2 INJECTION, SOLUTION INTRAMUSCULAR; INTRAVENOUS at 16:18

## 2022-12-29 RX ADMIN — MORPHINE SULFATE 2 MG: 2 INJECTION, SOLUTION INTRAMUSCULAR; INTRAVENOUS at 15:19

## 2022-12-29 RX ADMIN — MORPHINE SULFATE 2 MG: 2 INJECTION, SOLUTION INTRAMUSCULAR; INTRAVENOUS at 14:22

## 2022-12-29 RX ADMIN — Medication 5 ML: at 16:38

## 2022-12-29 RX ADMIN — DEXTROSE AND SODIUM CHLORIDE 1000 ML: 5; 450 INJECTION, SOLUTION INTRAVENOUS at 14:19

## 2022-12-29 NOTE — TELEPHONE ENCOUNTER
Pt called in to triage requesting IVF pain meds for overall pain rated 10/10 x 3 days. Stated last took prn oxycodone at 0600 this morning without relief. Denied any fevers, chills, cough, sob, chest pain, numbness or tingling or other symptoms not typical of sickle cell pain.   Pt's last infusion was 12/26/22, last clinic visit 11/17/22 with follow-up on 1/2/23 with Dr. Duncan.   Patient states needs transportation, 25 minutes from clinic.  Pt requesting Oxycodone refill. Will pend up in separate encounter.  Pt qualifies for sickle cell standing order protocol.  If you do not hear from the infusion center by 2pm then you will not be able to get in for an infusion today.     Added to infusion wait list.

## 2022-12-29 NOTE — TELEPHONE ENCOUNTER
Narcotic Refill Request    Medication(s) requested:  Oxycodone IR 15 mg tablet  Person Requesting Refill: Jennifer  What pain is the medication treating:  Sickle cell pain  How is the medication being taken?: 1 tab q4h, max 6 per day  Does pt have enough for today? yes  Is pain being adequately controlled on the current regimen?: yes  Experiencing any side effects from medication?: no    Date of most recent appointment:  11/17/22 with Patricia Mantilla CNP  Any No Show Visits:No  Next appointment:   1/2/23 with Dr. Duncan  Last fill date and by whom:  12/21/22 with Patricia Mantilla CNP   Reviewed: No    Routed/Paged provider: Pended and Routed to Patricia Mantilla CNP

## 2022-12-29 NOTE — TELEPHONE ENCOUNTER
[facility].  Called Jennifer who confirmed she can come to infusion at 1400.  Updated Roz in [facility].  Message sent to SW to assist with transportation.  Message sent to [facility] to schedule.

## 2022-12-29 NOTE — PROGRESS NOTES
Chief Complaint   Patient presents with     Infusion     IV fluids, pain meds       Infusion Nursing Note:  Jennifer Cervantes presents today for IV fluids, pain meds.    Patient seen by provider today: No   present during visit today: Not Applicable.    Note:    Patient arrived with 9/10 pain.  IV fluids infused over 2 hours.   Each dose Morphine administered over 3 minutes. Declined needing benadryl today.   Pain 3/10 at end of infusion.     Intravenous Access:  Implanted Port.  No labs due.     Treatment Conditions:  Approved per sickle cell team for infusion.    Post Infusion Assessment:  Patient tolerated infusion without incident.  Patient observed for 15 minutes post Morphine per protocol.  Blood return noted pre and post infusion.  Site patent and intact, free from redness, edema or discomfort.  No evidence of extravasations.  Access discontinued per protocol.     Discharge Plan:   Copy of AVS reviewed with patient and/or family.  Patient will follow up with team.  Patient discharged in stable condition accompanied by: self.  Departure Mode: Ambulatory.  Confirmed medical cab ride home.     Administrations This Visit     dextrose 5% and 0.45% NaCl BOLUS     Admin Date  12/29/2022 Action  New Bag Dose  1,000 mL Rate  500 mL/hr Route  Intravenous Administered By  Tricia Adame, JOE          heparin 100 UNIT/ML injection 5 mL     Admin Date  12/29/2022 Action  Given Dose  5 mL Route  Intracatheter Administered By  Tricia Adame RN          morphine (PF) injection 2 mg     Admin Date  12/29/2022 Action  Given Dose  2 mg Route  Intravenous Administered By  Tricia Adame RN           Admin Date  12/29/2022 Action  Given Dose  2 mg Route  Intravenous Administered By  Tricia Adame RN           Admin Date  12/29/2022 Action  Given Dose  2 mg Route  Intravenous Administered By  Tricia Adame RN                Vital signs:  Temp: 98.2  F (36.8  C) Temp src: Oral BP: 114/74 Pulse: 98   Resp: 18 SpO2:  "93 % O2 Device: None (Room air)        Estimated body mass index is 28.31 kg/m  as calculated from the following:    Height as of 12/24/22: 1.626 m (5' 4.02\").    Weight as of 12/24/22: 74.8 kg (165 lb).                          "

## 2022-12-29 NOTE — LETTER
Date:December 30, 2022      Provider requested that no letter be sent. Do not send.       [facility]

## 2022-12-29 NOTE — PATIENT INSTRUCTIONS
Dear Jennifer Cervantes    Thank you for choosing [facility] ([facility]) for your infusion.  The following information is a summary of our appointment as well as important reminders.          We look forward in seeing you on your next appointment here at [facility] ([facility]).  Please don t hesitate to call us at [phone] to reschedule any of your appointments or to speak with one of the [facility] registered nurses.  It was a pleasure taking care of you today.    Sincerely,    [facility]  [address] [place] [address]  [id number]  Phone:  [phone]

## 2022-12-29 NOTE — LETTER
12/29/2022         RE: Jennifer Cervantes  [address]  [facility] [address]        Dear Colleague,    Thank you for referring your patient, Jennifer Cervantes, to the [facility]. Please see a copy of my visit note below.    Chief Complaint   Patient presents with     Infusion     IV fluids, pain meds       Infusion Nursing Note:  Jennifer Cervantes presents today for IV fluids, pain meds.    Patient seen by provider today: No   present during visit today: Not Applicable.    Note:    Patient arrived with 9/10 pain.  IV fluids infused over 2 hours.   Each dose Morphine administered over 3 minutes. Declined needing benadryl today.   Pain 3/10 at end of infusion.     Intravenous Access:  Implanted Port.  No labs due.     Treatment Conditions:  Approved per sickle cell team for infusion.    Post Infusion Assessment:  Patient tolerated infusion without incident.  Patient observed for 15 minutes post Morphine per protocol.  Blood return noted pre and post infusion.  Site patent and intact, free from redness, edema or discomfort.  No evidence of extravasations.  Access discontinued per protocol.     Discharge Plan:   Copy of AVS reviewed with patient and/or family.  Patient will follow up with team.  Patient discharged in stable condition accompanied by: self.  Departure Mode: Ambulatory.  Confirmed medical cab ride home.     Administrations This Visit     dextrose 5% and 0.45% NaCl BOLUS     Admin Date  12/29/2022 Action  New Bag Dose  1,000 mL Rate  500 mL/hr Route  Intravenous Administered By  Tricia Adame, JOE          heparin 100 UNIT/ML injection 5 mL     Admin Date  12/29/2022 Action  Given Dose  5 mL Route  Intracatheter Administered By  Tricia Adame, RN          morphine (PF) injection 2 mg     Admin Date  12/29/2022 Action  Given Dose  2 mg Route  Intravenous Administered By  Tricia Adame RN           Admin Date  12/29/2022 Action  Given Dose  2 mg  "Route  Intravenous Administered By  Tricia Adame RN           Admin Date  12/29/2022 Action  Given Dose  2 mg Route  Intravenous Administered By  Tricia Adame RN                Vital signs:  Temp: 98.2  F (36.8  C) Temp src: Oral BP: 114/74 Pulse: 98   Resp: 18 SpO2: 93 % O2 Device: None (Room air)        Estimated body mass index is 28.31 kg/m  as calculated from the following:    Height as of 12/24/22: 1.626 m (5' 4.02\").    Weight as of 12/24/22: 74.8 kg (165 lb).                              Again, thank you for allowing me to participate in the care of your patient.        Sincerely,        Specialty Infusion Nurse    "

## 2022-12-30 ENCOUNTER — ALLIED HEALTH/NURSE VISIT (OUTPATIENT)
Dept: INTERNAL MEDICINE | Facility: CLINIC | Age: 22
End: 2022-12-30
Payer: COMMERCIAL

## 2022-12-30 DIAGNOSIS — Z30.42 DEPO-PROVERA CONTRACEPTIVE STATUS: Primary | ICD-10-CM

## 2022-12-30 LAB — BACTERIA UR CULT: ABNORMAL

## 2022-12-30 PROCEDURE — 99207 PR NO CHARGE NURSE ONLY: CPT

## 2022-12-30 PROCEDURE — 96372 THER/PROPH/DIAG INJ SC/IM: CPT

## 2022-12-30 RX ADMIN — MEDROXYPROGESTERONE ACETATE 150 MG: 150 INJECTION, SUSPENSION INTRAMUSCULAR at 13:48

## 2022-12-30 NOTE — PROGRESS NOTES
Jennifer Cervantes comes into clinic today for their Depo Provera injection at the request of Dr. Case. The injection site was cleaned with an alcohol prep wipe. A 25G 1 inch needle was injected into the right deltoid. Too much resistance was felt in the syringe and the needle was removed. About 0.2 mL was administered. The needle was switched out for a 23G 1 inch needle and the remaining medication was injected into the left deltoid. The injection site was cleaned with alcohol prep wipe prior to administration. See MAR for other administration details. No swelling or redness was observed at the site of injection after the medication was given.     This service provided today was under the direct supervision of Dr. Woodruff, who was available if needed.    Clinic Administered Medication Documentation  Administrations This Visit     medroxyPROGESTERone (DEPO-PROVERA) injection 150 mg     Admin Date  12/30/2022 Action  Given Dose  150 mg Route  Intramuscular Site   Administered By  Yadi Forte EMT    Ordering Provider: Suraj Case MD    NDC: 8480-4592-96    Lot#: [id number]    : AMPHASTAR-IMS    Patient Supplied?: No            ADALID Stout at 1:50 PM on 12/30/2022

## 2023-01-01 NOTE — PROGRESS NOTES
Infusion Nursing Note:  Jennifer Cervantes presents today for IVF/Pain medications.    Patient seen by provider today: No   present during visit today: Not Applicable.    Note: Patient reported to clinic with complaint of pain in low back rating it at a 10, upon leaving after IVF and 3 doses of Morphine she rated her pain at a 7.  Patient declined Benadryl and Zofran today.    Intravenous Access:  Implanted Port.    Treatment Conditions:  Lab Results   Component Value Date    HGB 7.6 11/17/2020     Lab Results   Component Value Date    WBC 11.3 11/17/2020      Lab Results   Component Value Date    ANEU 7.7 11/17/2020     Lab Results   Component Value Date     11/17/2020      Lab Results   Component Value Date     11/17/2020                   Lab Results   Component Value Date    POTASSIUM 3.6 11/17/2020           No results found for: MAG         Lab Results   Component Value Date    CR 0.58 11/17/2020                   Lab Results   Component Value Date    MICAH 8.8 11/17/2020                Lab Results   Component Value Date    BILITOTAL 4.0 11/17/2020           Lab Results   Component Value Date    ALBUMIN 4.0 11/17/2020                    Lab Results   Component Value Date    ALT 88 11/17/2020           Lab Results   Component Value Date     11/17/2020           Post Infusion Assessment:  Patient tolerated infusion without incident.  Blood return noted pre and post infusion.  Site patent and intact, free from redness, edema or discomfort.  No evidence of extravasations.  Access discontinued per protocol.       Discharge Plan:   Patient declined prescription refills.  Discharge instructions reviewed with: Patient.  Patient and/or family verbalized understanding of discharge instructions and all questions answered.  AVS to patient via [employer].  Patient will return 12/4/2020 with Cornelia Suresh for next appointment.   Patient discharged in stable condition accompanied by: self.  Departure  Mode: Ambulatory.  Face to Face time: 10 minutes.    Aravind Feldman RN                       EOAE (evoked otoacoustic emission)

## 2023-01-02 ENCOUNTER — ONCOLOGY VISIT (OUTPATIENT)
Dept: ONCOLOGY | Facility: CLINIC | Age: 23
End: 2023-01-02
Attending: REGISTERED NURSE
Payer: COMMERCIAL

## 2023-01-02 ENCOUNTER — APPOINTMENT (OUTPATIENT)
Dept: LAB | Facility: CLINIC | Age: 23
End: 2023-01-02
Payer: COMMERCIAL

## 2023-01-02 ENCOUNTER — INFUSION THERAPY VISIT (OUTPATIENT)
Dept: ONCOLOGY | Facility: CLINIC | Age: 23
End: 2023-01-02
Payer: COMMERCIAL

## 2023-01-02 VITALS
WEIGHT: 163.8 LBS | OXYGEN SATURATION: 91 % | DIASTOLIC BLOOD PRESSURE: 62 MMHG | BODY MASS INDEX: 28.1 KG/M2 | RESPIRATION RATE: 16 BRPM | TEMPERATURE: 98.1 F | HEART RATE: 93 BPM | SYSTOLIC BLOOD PRESSURE: 117 MMHG

## 2023-01-02 DIAGNOSIS — I69.351 SPASTIC HEMIPLEGIA OF RIGHT DOMINANT SIDE AS LATE EFFECT OF CEREBRAL INFARCTION (H): ICD-10-CM

## 2023-01-02 DIAGNOSIS — D57.1 HB-SS DISEASE WITHOUT CRISIS (H): Primary | ICD-10-CM

## 2023-01-02 DIAGNOSIS — F41.9 ANXIETY: ICD-10-CM

## 2023-01-02 DIAGNOSIS — D57.00 SICKLE CELL PAIN CRISIS (H): ICD-10-CM

## 2023-01-02 DIAGNOSIS — G47.00 PERSISTENT INSOMNIA: ICD-10-CM

## 2023-01-02 DIAGNOSIS — E83.111 IRON OVERLOAD DUE TO REPEATED RED BLOOD CELL TRANSFUSIONS: ICD-10-CM

## 2023-01-02 DIAGNOSIS — G81.10 SPASTIC HEMIPLEGIA, UNSPECIFIED ETIOLOGY, UNSPECIFIED LATERALITY (H): ICD-10-CM

## 2023-01-02 LAB
ALBUMIN SERPL BCG-MCNC: 4.2 G/DL (ref 3.5–5.2)
ALP SERPL-CCNC: 77 U/L (ref 35–104)
ALT SERPL W P-5'-P-CCNC: 21 U/L (ref 10–35)
ANION GAP SERPL CALCULATED.3IONS-SCNC: 10 MMOL/L (ref 7–15)
AST SERPL W P-5'-P-CCNC: 42 U/L (ref 10–35)
BILIRUB SERPL-MCNC: 4.3 MG/DL
BUN SERPL-MCNC: 3.8 MG/DL (ref 6–20)
CALCIUM SERPL-MCNC: 8.7 MG/DL (ref 8.6–10)
CHLORIDE SERPL-SCNC: 107 MMOL/L (ref 98–107)
CREAT SERPL-MCNC: 0.48 MG/DL (ref 0.51–0.95)
DEPRECATED HCO3 PLAS-SCNC: 20 MMOL/L (ref 22–29)
ERYTHROCYTE [DISTWIDTH] IN BLOOD BY AUTOMATED COUNT: 26.1 % (ref 10–15)
FERRITIN SERPL-MCNC: 6758 NG/ML (ref 6–175)
GFR SERPL CREATININE-BSD FRML MDRD: >90 ML/MIN/1.73M2
GLUCOSE SERPL-MCNC: 109 MG/DL (ref 70–99)
HCT VFR BLD AUTO: 19.7 % (ref 35–47)
HGB BLD-MCNC: 7 G/DL (ref 11.7–15.7)
MCH RBC QN AUTO: 31.1 PG (ref 26.5–33)
MCHC RBC AUTO-ENTMCNC: 35.5 G/DL (ref 31.5–36.5)
MCV RBC AUTO: 88 FL (ref 78–100)
NRBC # BLD AUTO: 1 10E3/UL
NRBC BLD AUTO-RTO: 8 /100
PLATELET # BLD AUTO: 460 10E3/UL (ref 150–450)
POTASSIUM SERPL-SCNC: 3.6 MMOL/L (ref 3.4–5.3)
PROT SERPL-MCNC: 7 G/DL (ref 6.4–8.3)
RBC # BLD AUTO: 2.25 10E6/UL (ref 3.8–5.2)
RETICS # AUTO: 0.48 10E6/UL (ref 0.03–0.1)
RETICS/RBC NFR AUTO: 22.5 % (ref 0.5–2)
SODIUM SERPL-SCNC: 137 MMOL/L (ref 136–145)
WBC # BLD AUTO: 12 10E3/UL (ref 4–11)

## 2023-01-02 PROCEDURE — 82728 ASSAY OF FERRITIN: CPT | Performed by: PEDIATRICS

## 2023-01-02 PROCEDURE — 250N000011 HC RX IP 250 OP 636: Performed by: PEDIATRICS

## 2023-01-02 PROCEDURE — 99215 OFFICE O/P EST HI 40 MIN: CPT | Performed by: PEDIATRICS

## 2023-01-02 PROCEDURE — 258N000003 HC RX IP 258 OP 636: Performed by: PEDIATRICS

## 2023-01-02 PROCEDURE — 96376 TX/PRO/DX INJ SAME DRUG ADON: CPT

## 2023-01-02 PROCEDURE — 80053 COMPREHEN METABOLIC PANEL: CPT | Performed by: PEDIATRICS

## 2023-01-02 PROCEDURE — 96374 THER/PROPH/DIAG INJ IV PUSH: CPT

## 2023-01-02 PROCEDURE — G0463 HOSPITAL OUTPT CLINIC VISIT: HCPCS | Mod: 25 | Performed by: PEDIATRICS

## 2023-01-02 PROCEDURE — 250N000011 HC RX IP 250 OP 636: Performed by: REGISTERED NURSE

## 2023-01-02 PROCEDURE — 36592 COLLECT BLOOD FROM PICC: CPT | Performed by: PEDIATRICS

## 2023-01-02 PROCEDURE — 85045 AUTOMATED RETICULOCYTE COUNT: CPT | Performed by: PEDIATRICS

## 2023-01-02 PROCEDURE — 96361 HYDRATE IV INFUSION ADD-ON: CPT

## 2023-01-02 PROCEDURE — 85027 COMPLETE CBC AUTOMATED: CPT | Performed by: PEDIATRICS

## 2023-01-02 RX ORDER — HEPARIN SODIUM (PORCINE) LOCK FLUSH IV SOLN 100 UNIT/ML 100 UNIT/ML
5 SOLUTION INTRAVENOUS
Status: DISCONTINUED | OUTPATIENT
Start: 2023-01-02 | End: 2023-01-02 | Stop reason: HOSPADM

## 2023-01-02 RX ORDER — ONDANSETRON 8 MG/1
8 TABLET, FILM COATED ORAL
Status: CANCELLED
Start: 2023-04-01

## 2023-01-02 RX ORDER — HEPARIN SODIUM (PORCINE) LOCK FLUSH IV SOLN 100 UNIT/ML 100 UNIT/ML
5 SOLUTION INTRAVENOUS
Status: CANCELLED | OUTPATIENT
Start: 2023-04-01

## 2023-01-02 RX ORDER — FLUOXETINE 10 MG/1
10 CAPSULE ORAL DAILY
Qty: 40 CAPSULE | Refills: 1 | Status: SHIPPED | OUTPATIENT
Start: 2023-01-02 | End: 2023-08-16

## 2023-01-02 RX ORDER — HEPARIN SODIUM,PORCINE 10 UNIT/ML
5 VIAL (ML) INTRAVENOUS
Status: CANCELLED | OUTPATIENT
Start: 2023-04-01

## 2023-01-02 RX ORDER — TRAZODONE HYDROCHLORIDE 50 MG/1
50 TABLET, FILM COATED ORAL AT BEDTIME
Qty: 30 TABLET | Refills: 1 | Status: SHIPPED | OUTPATIENT
Start: 2023-01-02 | End: 2023-08-16

## 2023-01-02 RX ORDER — MORPHINE SULFATE 2 MG/ML
2 INJECTION, SOLUTION INTRAMUSCULAR; INTRAVENOUS
Status: CANCELLED
Start: 2023-04-01

## 2023-01-02 RX ORDER — DIPHENHYDRAMINE HCL 25 MG
25 CAPSULE ORAL
Status: CANCELLED
Start: 2023-04-01

## 2023-01-02 RX ORDER — MORPHINE SULFATE 2 MG/ML
2 INJECTION, SOLUTION INTRAMUSCULAR; INTRAVENOUS
Status: DISCONTINUED | OUTPATIENT
Start: 2023-01-02 | End: 2023-01-02 | Stop reason: HOSPADM

## 2023-01-02 RX ORDER — HEPARIN SODIUM (PORCINE) LOCK FLUSH IV SOLN 100 UNIT/ML 100 UNIT/ML
5 SOLUTION INTRAVENOUS
Status: DISCONTINUED | OUTPATIENT
Start: 2023-01-02 | End: 2023-01-04 | Stop reason: HOSPADM

## 2023-01-02 RX ADMIN — MORPHINE SULFATE 2 MG: 2 INJECTION, SOLUTION INTRAMUSCULAR; INTRAVENOUS at 09:05

## 2023-01-02 RX ADMIN — Medication 5 ML: at 08:17

## 2023-01-02 RX ADMIN — DEXTROSE AND SODIUM CHLORIDE 1000 ML: 5; 450 INJECTION, SOLUTION INTRAVENOUS at 09:21

## 2023-01-02 RX ADMIN — MORPHINE SULFATE 2 MG: 2 INJECTION, SOLUTION INTRAMUSCULAR; INTRAVENOUS at 10:01

## 2023-01-02 RX ADMIN — MORPHINE SULFATE 2 MG: 2 INJECTION, SOLUTION INTRAMUSCULAR; INTRAVENOUS at 11:04

## 2023-01-02 RX ADMIN — Medication 5 ML: at 11:22

## 2023-01-02 ASSESSMENT — PAIN SCALES - GENERAL: PAINLEVEL: EXTREME PAIN (8)

## 2023-01-02 NOTE — PROGRESS NOTES
Infusion Nursing Note:  Jennifer Cervantes presents today for IV fluids and pain medication.    Patient seen by provider today: Yes: Dr. Duncan   present during visit today: Not Applicable.    Note: Pt presents today with 8/10 low back pain. She denies fevers, chills, nausea/vomiting, or other s/s of infection. Pt received IV fluids and 2mg morphine IV x3 per therapy plan. Her pain reduced to 4/10 by the completion of her IV fluids. Pt to see Dr. Duncan today in clinic after infusion.    Intravenous Access:  Implanted Port.    Treatment Conditions:  Lab Results   Component Value Date    HGB 7.0 (L) 01/02/2023    WBC 12.0 (H) 01/02/2023    ANEU 5.8 12/23/2022    ANEUTAUTO 14.7 (H) 11/27/2022     (H) 01/02/2023      Lab Results   Component Value Date     01/02/2023    POTASSIUM 3.6 01/02/2023    MAG 2.0 11/11/2021    CR 0.48 (L) 01/02/2023    MICAH 8.7 01/02/2023    BILITOTAL 4.3 (H) 01/02/2023    ALBUMIN 4.2 01/02/2023    ALT 21 01/02/2023    AST 42 (H) 01/02/2023     Results reviewed, labs MET treatment parameters, ok to proceed with treatment.    Post Infusion Assessment:  Patient tolerated infusion without incident.  Blood return noted pre and post infusion.  Site patent and intact, free from redness, edema or discomfort.  No evidence of extravasations.  Access discontinued per protocol.     Discharge Plan:   Patient declined prescription refills.  Discharge instructions reviewed with: Patient.  Patient and/or family verbalized understanding of discharge instructions and all questions answered.  AVS to patient via [employer].  Patient is not scheduled for any further infusion appointments, pt to see Dr. Duncan this afternoon in clinic.  Patient discharged in stable condition accompanied by: self.  Departure Mode: Ambulatory.      Emily Meese, RN

## 2023-01-02 NOTE — NURSING NOTE
Chief Complaint   Patient presents with     Port Draw     Vitals taken, port accessed, labs drawn, heparin locked, checked into next appt     /62 (BP Location: Left arm, Patient Position: Sitting, Cuff Size: Adult Large)   Pulse 93   Temp 98.1  F (36.7  C) (Oral)   Resp 16   Wt 74.3 kg (163 lb 12.8 oz)   SpO2 91%   BMI 28.10 kg/m    Aravind Feldman RN on 1/2/2023 at 8:10 AM

## 2023-01-02 NOTE — LETTER
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 She had multiple thromboembolic events in 2021 despite adherent anticoagulation use (though warfarin was perpetually low) and there are concerns for chronic thromboembolic disease but did not have pulmonary HTN on a November 2021 cath. She is maintained on chronic PO opioids and twice-weekly infusion visits (since 1/24/22) but has been able to be maintained on this regimen and has stayed out of the ED most of the time with even rarer admissions.    Interval History:  Jennifer is in clinic for one of her normal Monday transfusions.  She said the cold weather has been bothering her, but she is trying to stay out of the ED as much as possible.  She is willing to wait a bit on her oxycodone taper but has not needed any extra on top of her prescribed dosing.  She said that there is a bit of a fall out with her mom over the holidays, and she is still wanting to move out to her own place, but it probably will not be until the summer.  She does say that the Desferal infusions are going well.  No other missed doses of her medications.  She is continuing to work on her driving, as she knows that she will need it for living more independently.  For now though, she is prioritizing staying out of the cold weather.    In terms of her medical management, she had a couple requests today.  First, when I comes to her oxycodone  "weans in the summer, she wants me to be in charge of that.  She does not think she will need Suboxone but is open to guidance as we get closer to that time frame.  Second, she had a tendon release on her right side around 2020 with Dr. Franco.  She has also been getting Botox injections with Dr. Pierce.  She would like to see Dr. Franco again soon and is requesting a referral.    Sickle Cell Disease Comprehensive Checklist    Bone Health/Avascular Necrosis Screening/Symptoms (each visit): no new concerns today    Leg Ulcer evaluation (every visit): none    Hypertension (every visit):stable, high normal    Last pulmonary evaluation (asthma, AMAN, pulm HTN): 9/28/22    Stroke/silent cerebral infarct Hx (Y/N): Yes TIA ~2014, first event ~age 1 with full stroke and R sided weakness    Last PCP Visit: 8/2020    Vaccines:  ? PCV13: 5/13/19  ? Pneumovax (PPSV23): 3/04, 10/09, 7/12/19 (next due 7/2024)  ? Menactra: 4/2010, 9/2015 (MCV done 8/16/21)  ? MenB: 2015/2019  ? Influenza: 11/22/22  ? COVID: none    Audiology (chelation): done 6/2020, normal.. However, on 6/7, \"While there is no significant change in grade on the CTCAE4.03 Scale, there were hearing changes bilaterally for both standard and extended high frequency audiometry.  Will need to determine if an ENT consult is advised due to the asymmetry in extended high frequency testing.\"     Plan last reviewed with patient: 7/11/22    Patient background: 21 yo F, enjoys movies and kids though there are times where she does not really want to talk to people. Does not have a lot of social support at home.     Sickle Cell Disease History  Primary Hematologist Team: Jose Rafael Duncan  PCP: none  Genotype: SS  Acute Pain Crisis Treatment: (avoiding IV opioids for now, which she has agreed to)    ER   o Oxycodone 15-20 mg x1  o Ketamine 4mg IV x 2--helps with opioid sparing  o Toradol 15 mg IV x1   o Maintenance IV fluids with LR  o Other: Zofran 8 mg IV PRN " nausea    Inpatient:  o Home oxycodone/Oxycontin regimen, though home oxycodone dosing could be increased to 20 mg to start  o PCA plan:   - None for now  o Other Medications: Zofran  o She has had success with ketamine starting at 4mg/h and advancing only to 6mg/h, as 8mg/h made her feel quite poorly..  o ASA  o Supportive Care: Docusate, Senna  Chronic Pain Medications:    Home regimen oxycodone 15 mg p.o. q.4 hours p.r.n. breakthrough pain.  She also continues on Voltaren gel, and Zoloft among other medications.    -Also benefits from mental health visits, acupuncture  Baseline Hemoglobin: 7 g/dl without chronic transfusions  Hydroxyurea use: Yes  H/O blood transfusions: Yes, several (iron overload) Most recent 11/20/2021    H/O Transfusion Reactions: no    Antibodies:none  H/O Acute Chest Syndrome: Yes    Last episode:9/05/22 (previously 4/26/21, 10/2019)     ICU/intubation: not with 9/2022 admission  H/O Stroke: Yes (managed with chronic transfusions in the past, stopped late Spring 2020)  H/O VTE: Yes (2/2021)  H/O Cholecystectomy or Splenectomy: Cholecystectomy  H/O Asthma, Pulm HTN, AVN, Leg Ulcers, Nephropathy, Retinopathy, etc: Iron overload, asthma, chronic lung disease, physical limitations from early stroke    ---------------------------------------  Jennifer Cervantes's Goals (discussed 11/17/22--confirmed 1/2/23)    1-3 month goal:  Continue journaling and reading.    6 month goal:  Continue to work on driving     12 month goal:  Move into her own place     Disease-specific goal(s):  Continue to stay out of the hospital, be off regular opioids in the future (previous goal was March 2023, now more likely June 2023)  ---------------------------------------      Current Outpatient Medications   Medication Sig Dispense Refill     acetaminophen (TYLENOL) 325 MG tablet Take 2 tablets (650 mg) by mouth every 6 hours as needed for mild pain 120 tablet 3     albuterol (PROVENTIL) (2.5 MG/3ML) 0.083% neb solution  Take 2 vials (5 mg) by nebulization every 6 hours as needed for shortness of breath / dyspnea or wheezing 90 mL 3     aspirin (ASA) 81 MG chewable tablet Take 1 tablet (81 mg) by mouth 2 times daily 60 tablet 11     budesonide-formoterol (SYMBICORT) 160-4.5 MCG/ACT Inhaler Inhale 2 puffs into the lungs 2 times daily 10.2 g 3     deferasirox (JADENU) 360 MG tablet Take 4 tablets (1,440 mg) by mouth every evening 120 tablet 4     Deferiprone, Twice Daily, 1000 MG TABS Take 1,500 mg by mouth 2 times daily 60 tablet 1     EPINEPHrine (ANY BX GENERIC EQUIV) 0.3 MG/0.3ML injection 2-pack Inject 0.3 mLs (0.3 mg) into the muscle as needed for anaphylaxis (Patient not taking: Reported on 12/26/2022) 1 each 1     Hydroxyurea 1000 MG TABS Take 3,000 mg by mouth daily 90 tablet 3     nitroFURantoin macrocrystal-monohydrate (MACROBID) 100 MG capsule Take 1 capsule (100 mg) by mouth 2 times daily for 5 days 10 capsule 0     ondansetron (ZOFRAN) 8 MG tablet Take 1 tablet (8 mg) by mouth every 8 hours as needed 30 tablet 1     oxyCODONE IR (ROXICODONE) 15 MG tablet Take 1 tablet (15 mg) by mouth every 4 hours as needed for severe pain (7-10) Goal 4 per day. Max 6 per day. 40 tablet 0       Past Medical History  Past Medical History:   Diagnosis Date     Anxiety      Bleeding disorder (H)      Blood clotting disorder (H)      Cerebral infarction (H) 2015     Cognitive developmental delay     low IQ. Please recognize when managing pain and planning with her     Depressive disorder      Hemiplegia and hemiparesis following cerebral infarction affecting right dominant side (H)     right hand contractures     Iron overload due to repeated red blood cell transfusions      Migraines      Multiple subsegmental pulmonary emboli without acute cor pulmonale (H) 02/01/2021     Oppositional defiant behavior      Presence of intrauterine contraceptive device 2/18/2020     Superficial venous thrombosis of arm, right 03/25/2021     Uncomplicated  asthma      Past Surgical History:   Procedure Laterality Date     AS INSERT TUNNELED CV 2 CATH W/O PORT/PUMP       CHOLECYSTECTOMY       CV RIGHT HEART CATH MEASUREMENTS RECORDED N/A 11/18/2021    Procedure: Right Heart Cath;  Surgeon: Jackson Stauffer MD;  Location:  HEART CARDIAC CATH LAB     INSERT PORT VASCULAR ACCESS Left 4/21/2021    Procedure: INSERTION, VASCULAR ACCESS PORT (NOT SURE ON SIDE UNTIL REMOVAL);  Surgeon: Rajan More MD;  Location: [facility] OR     IR CHEST PORT PLACEMENT > 5 YRS OF AGE  4/21/2021     IR CVC NON TUNNEL LINE REMOVAL  5/6/2021     IR CVC NON TUNNEL PLACEMENT > 5 YRS  04/07/2020     IR CVC NON TUNNEL PLACEMENT > 5 YRS  4/30/2021     IR CVC NON TUNNEL PLACEMENT > 5 YRS  9/7/2022     IR PORT REMOVAL LEFT  4/21/2021     REMOVE PORT VASCULAR ACCESS Left 4/21/2021    Procedure: REMOVAL, VASCULAR ACCESS PORT LEFT;  Surgeon: Rajan More MD;  Location: [facility] OR     REPAIR TENDON ELBOW Right 10/02/2019    Procedure: Right Elbow Flexor Lengthening, Flexor Pronator Slide Of Wrist and Finger, Thumb Adductor Lengthening;  Surgeon: Anai Franco MD;  Location: [facility] OR     TONSILLECTOMY Bilateral 10/02/2019    Procedure: Bilateral Tonsillectomy;  Surgeon: Farhana Guy MD;  Location: [facility] OR     ZZC BREAST REDUCTION (INCLUDES LIPO) TIER 3 Bilateral 04/23/2019     Allergies   Allergen Reactions     Contrast Dye      Hives and breathing issues     Fish-Derived Products Hives     Seafood Hives     Diagnostic X-Ray Materials      Gadolinium      Social History   Social History     Tobacco Use     Smoking status: Never     Smokeless tobacco: Never   Substance Use Topics     Alcohol use: Not Currently     Alcohol/week: 0.0 standard drinks     Drug use: Never    Still living at home with mom and extended family.     Past medical history and social history were reviewed.    Physical Examination:  /62 (BP Location: Left arm, Patient Position: Sitting, Cuff Size: Adult Large)    Pulse 93   Temp 98.1  F (36.7  C) (Oral)   Resp 16   Wt 74.3 kg (163 lb 12.8 oz)   SpO2 91%   BMI 28.10 kg/m    Wt Readings from Last 10 Encounters:   01/02/23 74.3 kg (163 lb 12.8 oz)   12/24/22 74.8 kg (165 lb)   11/28/22 74.2 kg (163 lb 9.3 oz)   11/03/22 77.1 kg (170 lb)   10/12/22 75 kg (165 lb 4.8 oz)   09/28/22 73.9 kg (163 lb)   09/28/22 74.3 kg (163 lb 14.4 oz)   09/26/22 73.8 kg (162 lb 12.8 oz)   09/11/22 83.2 kg (183 lb 8 oz)   09/05/22 76 kg (167 lb 9.6 oz)     General: Well-appearing female in no acute distress. Smiling and engaging today  Eyes: EOMI, PERRL. No scleral icterus.  ENT: Oral mucosa is moist without lesions or thrush.   Cardiovascular: RRR No murmurs, gallops, or rubs. No peripheral edema.  Respiratory: CTA bilaterally. No wheezes or crackles.  MSK: Contractured right arm and hand 2/2 stroke. unchanged  Neurologic: Grossly nonfocal. Antalgic gait  Skin: No rashes, petechiae, or bruising noted on exposed skin.    Laboratory Data:   Latest Reference Range & Units 01/02/23 08:16   Sodium 136 - 145 mmol/L 137   Potassium 3.4 - 5.3 mmol/L 3.6   Chloride 98 - 107 mmol/L 107   Carbon Dioxide (CO2) 22 - 29 mmol/L 20 (L)   Urea Nitrogen 6.0 - 20.0 mg/dL 3.8 (L)   Creatinine 0.51 - 0.95 mg/dL 0.48 (L)   GFR Estimate >60 mL/min/1.73m2 >90   Calcium 8.6 - 10.0 mg/dL 8.7   Anion Gap 7 - 15 mmol/L 10   Albumin 3.5 - 5.2 g/dL 4.2   Protein Total 6.4 - 8.3 g/dL 7.0   Alkaline Phosphatase 35 - 104 U/L 77   ALT 10 - 35 U/L 21   AST 10 - 35 U/L 42 (H)   Bilirubin Total <=1.2 mg/dL 4.3 (H)   Ferritin 6 - 175 ng/mL 6,758 (H)   Glucose 70 - 99 mg/dL 109 (H)   WBC 4.0 - 11.0 10e3/uL 12.0 (H)   Hemoglobin 11.7 - 15.7 g/dL 7.0 (L)   Hematocrit 35.0 - 47.0 % 19.7 (L)   Platelet Count 150 - 450 10e3/uL 460 (H)   RBC Count 3.80 - 5.20 10e6/uL 2.25 (L)   MCV 78 - 100 fL 88   MCH 26.5 - 33.0 pg 31.1   MCHC 31.5 - 36.5 g/dL 35.5   RDW 10.0 - 15.0 % 26.1 (H)   Absolute NRBCs 10e3/uL 1.0   NRBCs per 100 WBC <1 /100  8 (H)   % Retic 0.5 - 2.0 % 22.5 (H)   Absolute Retic 0.025 - 0.095 10e6/uL 0.478 (H)   (L): Data is abnormally low  (H): Data is abnormally high     Cardiac MRI   Scan Date:   2022-11-17 08:39:48                                   Electronically signed by Ashanti Zavala 2022-Nov-17 15:16:37     SUMMARY   ==========================================================================================================     Clinical history:  22-year-old woman with sickle cell disease. CMR to assess for cardiac siderosis.      Comparison CMR: 2021     1. The left ventricle is normal in cavity size and wall thickness. There are no regional wall motion abnormalities. The global systolic function is normal. The LVEF is 63%.   2. The right ventricle is normal in cavity size. The global systolic function is normal. The RVEF is 59%.    3. Both atria are normal in size.   4. There is no significant valvular disease.   5. There is no pericardial effusion.   6. The average T2* value is 38 ms (normal).      CONCLUSIONS:   Normal biventricular function with no evidence of cardiac siderosis. Biventricular function is stable.       MRI FOR LIVER IRON CONCENTRATION ([facility])     CLINICAL HISTORY:  Sickle cell disease with chronic severe iron overload on articulation therapy, evaluate for response     Comparison:  1/25/2022 [facility]  8/18/2021 [facility]     TECHNIQUE:  Sequential non-contrast spin-echo MRI imaging obtained of the liver with TR 2500 msec and progressively longer Miley up to 18 msec.     FINDINGS:  Average liver iron concentration is 31.6 mg/g dry tissue (normal =0.17 - 1.8), previously greater than 43  Average liver iron concentration is 566 mmol/kg dry tissue (normal = 3 - 33), previously greater than 770     Other findings: Marked loss of signal in the liver and spleen consistent with diffuse iron deposition.                                                                      IMPRESSION:  Decreased iron deposition in the  liver as detailed above compared to 1/25/2022 Brenda.     I have personally reviewed the examination and initial interpretation and I agree with the findings.     KALI ROSARIO MD          Assessment and Plan:  1. Sickle Cell HgbSS Disease  2. Recent hospitalization with acute chest (9/2022)  3. Chronic Pain  4. 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 History of CVA  7. Hearing loss    Jennifer has been feeling well since her last hospitalization with only two ED visits last month. She continues to optimize her home pain regimen in an effort to avoid ED visits.  It is worth remarking that for Jennifer, she went from just short of 100 ED and hospitalization visits in 2021 all the way to 19 visits in 2022.  I congratulated her on this feet, as its not easy to do.  Our current regimen has kept her both out of the hospital but also seeming to thrive and gain more confidence at home.    She would like to continue Desferal infusions and her last LIC had dropped again thankfully.    We should focus on making sure she gets COVID vaccinations or adding them to our immunization record if she has already gotten them.    Plan:  -Continue Hydrea to 3000mg daily to help lessen frequency of sickle cell pain  -Continue Oxycodone 15 mg q4h at home with Rx of 40 tabs/week. She can self-reduce infusion days/week but continue to keep the cap at 2/week for now.  As the summer rolls around, we will focus on starting to wean the oxycodone, perhaps by dose first and then pill count.  -Infusion center visits limited to two times per week (Mondays and Fridays). Per request, morphine changed to Dilaudid (1 mg) for infusion clinic to trial cycling of opioids. Continue diligent home management with current medications, heat, rest, compression, warm baths.   -If unable to manage at home can go to ED but continue to not do IV narcotics in the emergency room. Ketamine previously added to  pain plan in ED  -Continue Desferal infusions..   -Continue aspirin BID  -RTC in 4-6 weeks with Patricia Mantilla CNP.     40 minutes spent on the date of the encounter doing chart review, review of test results, interpretation of tests, patient visit and documentation         Eric Duncan MD  Classical Hematologist  Division of Hematology, Oncology, and Transplantation  [facility]  [facility]  Pager: [phone]

## 2023-01-02 NOTE — PATIENT INSTRUCTIONS
[facility] Triage and after hours / weekends / holidays:  [phone]    Please call the triage or after hours line if you experience a temperature greater than or equal to 100.4, shaking chills, have uncontrolled nausea, vomiting and/or diarrhea, dizziness, shortness of breath, chest pain, bleeding, unexplained bruising, or if you have any other new/concerning symptoms, questions or concerns.      If you are having any concerning symptoms or wish to speak to a provider before your next infusion visit, please call your care coordinator or triage to notify them so we can adequately serve you.     If you need a refill on a narcotic prescription or other medication, please call before your infusion appointment.                January 2023 Sunday Monday Tuesday Wednesday Thursday Friday Saturday   1     2    LAB CENTRAL   8:30 AM   (15 min.)   [facility]   [facility]    ONC INFUSION 2 HR (120 MIN)   9:00 AM   (120 min.)    ONC INFUSION NURSE   [facility]    RETURN  11:15 AM   (30 min.)   Eric Duncan MD   [facility] 3     4     5    RETURN  10:45 AM   (60 min.)   Katherine Pierce MD   [facility] 6     7       8     9     10     11     12     13     14       15     16     17     18     19     20     21       22     23     24     25     26     27     28       29     30     31                                       February 2023 Sunday Monday Tuesday Wednesday Thursday Friday Saturday                  1     2     3     4       5     6     7     8     9     10     11       12     13     14     15     16     17     18       19     20     21     22     23     24     25       26     27     28                                           Lab Results:  Recent Results (from the past 12 hour(s))   Comprehensive metabolic panel    Collection Time: 01/02/23  8:16 AM   Result Value Ref  Range    Sodium 137 136 - 145 mmol/L    Potassium 3.6 3.4 - 5.3 mmol/L    Chloride 107 98 - 107 mmol/L    Carbon Dioxide (CO2) 20 (L) 22 - 29 mmol/L    Anion Gap 10 7 - 15 mmol/L    Urea Nitrogen 3.8 (L) 6.0 - 20.0 mg/dL    Creatinine 0.48 (L) 0.51 - 0.95 mg/dL    Calcium 8.7 8.6 - 10.0 mg/dL    Glucose 109 (H) 70 - 99 mg/dL    Alkaline Phosphatase 77 35 - 104 U/L    AST 42 (H) 10 - 35 U/L    ALT 21 10 - 35 U/L    Protein Total 7.0 6.4 - 8.3 g/dL    Albumin 4.2 3.5 - 5.2 g/dL    Bilirubin Total 4.3 (H) <=1.2 mg/dL    GFR Estimate >90 >60 mL/min/1.73m2   Reticulocyte count    Collection Time: 01/02/23  8:16 AM   Result Value Ref Range    % Reticulocyte 22.5 (H) 0.5 - 2.0 %    Absolute Reticulocyte 0.478 (H) 0.025 - 0.095 10e6/uL   CBC with platelets and differential    Collection Time: 01/02/23  8:16 AM   Result Value Ref Range    WBC Count 12.0 (H) 4.0 - 11.0 10e3/uL    RBC Count 2.25 (L) 3.80 - 5.20 10e6/uL    Hemoglobin 7.0 (L) 11.7 - 15.7 g/dL    Hematocrit 19.7 (L) 35.0 - 47.0 %    MCV 88 78 - 100 fL    MCH 31.1 26.5 - 33.0 pg    MCHC 35.5 31.5 - 36.5 g/dL    RDW 26.1 (H) 10.0 - 15.0 %    Platelet Count 460 (H) 150 - 450 10e3/uL    NRBCs per 100 WBC 8 (H) <1 /100    Absolute NRBCs 1.0 10e3/uL

## 2023-01-02 NOTE — LETTER
1/2/2023         RE: Jennifer Cervantes  [address]  [facility] [address]        Dear Colleague,    Thank you for referring your patient, Jennifer Cervantes, to the [facility]. Please see a copy of my visit note below.    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 She had multiple thromboembolic events in 2021 despite adherent anticoagulation use (though warfarin was perpetually low) and there are concerns for chronic thromboembolic disease but did not have pulmonary HTN on a November 2021 cath. She is maintained on chronic PO opioids and twice-weekly infusion visits (since 1/24/22) but has been able to be maintained on this regimen and has stayed out of the ED most of the time with even rarer admissions.    Interval History:  Jennifer is in clinic for one of her normal Monday transfusions.  She said the cold weather has been bothering her, but she is trying to stay out of the ED as much as possible.  She is willing to wait a bit on her oxycodone taper but has not needed any extra on top of her prescribed dosing.  She said that there is a bit of a fall out with her mom over the holidays, and she is still wanting to move out to her own place, but it probably will not be until the summer.  She does say that the Desferal infusions are going well.  No other missed doses of her medications.  She is continuing to work on her driving, as she knows that she will need it for living more independently.  For now though,  "she is prioritizing staying out of the cold weather.    In terms of her medical management, she had a couple requests today.  First, when I comes to her oxycodone weans in the summer, she wants me to be in charge of that.  She does not think she will need Suboxone but is open to guidance as we get closer to that time frame.  Second, she had a tendon release on her right side around 2020 with Dr. Franco.  She has also been getting Botox injections with Dr. Pierce.  She would like to see Dr. Franco again soon and is requesting a referral.    Sickle Cell Disease Comprehensive Checklist    Bone Health/Avascular Necrosis Screening/Symptoms (each visit): no new concerns today    Leg Ulcer evaluation (every visit): none    Hypertension (every visit):stable, high normal    Last pulmonary evaluation (asthma, AMAN, pulm HTN): 9/28/22    Stroke/silent cerebral infarct Hx (Y/N): Yes TIA ~2014, first event ~age 1 with full stroke and R sided weakness    Last PCP Visit: 8/2020    Vaccines:  ? PCV13: 5/13/19  ? Pneumovax (PPSV23): 3/04, 10/09, 7/12/19 (next due 7/2024)  ? Menactra: 4/2010, 9/2015 (MCV done 8/16/21)  ? MenB: 2015/2019  ? Influenza: 11/22/22  ? COVID: none    Audiology (chelation): done 6/2020, normal.. However, on 6/7, \"While there is no significant change in grade on the CTCAE4.03 Scale, there were hearing changes bilaterally for both standard and extended high frequency audiometry.  Will need to determine if an ENT consult is advised due to the asymmetry in extended high frequency testing.\"     Plan last reviewed with patient: 7/11/22    Patient background: 23 yo F, enjoys movies and kids though there are times where she does not really want to talk to people. Does not have a lot of social support at home.     Sickle Cell Disease History  Primary Hematologist Team: Jose Rafael Duncan  PCP: none  Genotype: SS  Acute Pain Crisis Treatment: (avoiding IV opioids for now, which she has agreed to)    ER   o Oxycodone " 15-20 mg x1  o Ketamine 4mg IV x 2--helps with opioid sparing  o Toradol 15 mg IV x1   o Maintenance IV fluids with LR  o Other: Zofran 8 mg IV PRN nausea    Inpatient:  o Home oxycodone/Oxycontin regimen, though home oxycodone dosing could be increased to 20 mg to start  o PCA plan:   - None for now  o Other Medications: Zofran  o She has had success with ketamine starting at 4mg/h and advancing only to 6mg/h, as 8mg/h made her feel quite poorly..  o ASA  o Supportive Care: Docusate, Senna  Chronic Pain Medications:    Home regimen oxycodone 15 mg p.o. q.4 hours p.r.n. breakthrough pain.  She also continues on Voltaren gel, and Zoloft among other medications.    -Also benefits from mental health visits, acupuncture  Baseline Hemoglobin: 7 g/dl without chronic transfusions  Hydroxyurea use: Yes  H/O blood transfusions: Yes, several (iron overload) Most recent 11/20/2021    H/O Transfusion Reactions: no    Antibodies:none  H/O Acute Chest Syndrome: Yes    Last episode:9/05/22 (previously 4/26/21, 10/2019)     ICU/intubation: not with 9/2022 admission  H/O Stroke: Yes (managed with chronic transfusions in the past, stopped late Spring 2020)  H/O VTE: Yes (2/2021)  H/O Cholecystectomy or Splenectomy: Cholecystectomy  H/O Asthma, Pulm HTN, AVN, Leg Ulcers, Nephropathy, Retinopathy, etc: Iron overload, asthma, chronic lung disease, physical limitations from early stroke    ---------------------------------------  Jennifer Cervantes's Goals (discussed 11/17/22--confirmed 1/2/23)    1-3 month goal:  Continue journaling and reading.    6 month goal:  Continue to work on driving     12 month goal:  Move into her own place     Disease-specific goal(s):  Continue to stay out of the hospital, be off regular opioids in the future (previous goal was March 2023, now more likely June 2023)  ---------------------------------------      Current Outpatient Medications   Medication Sig Dispense Refill     acetaminophen (TYLENOL) 325 MG  tablet Take 2 tablets (650 mg) by mouth every 6 hours as needed for mild pain 120 tablet 3     albuterol (PROVENTIL) (2.5 MG/3ML) 0.083% neb solution Take 2 vials (5 mg) by nebulization every 6 hours as needed for shortness of breath / dyspnea or wheezing 90 mL 3     aspirin (ASA) 81 MG chewable tablet Take 1 tablet (81 mg) by mouth 2 times daily 60 tablet 11     budesonide-formoterol (SYMBICORT) 160-4.5 MCG/ACT Inhaler Inhale 2 puffs into the lungs 2 times daily 10.2 g 3     deferasirox (JADENU) 360 MG tablet Take 4 tablets (1,440 mg) by mouth every evening 120 tablet 4     Deferiprone, Twice Daily, 1000 MG TABS Take 1,500 mg by mouth 2 times daily 60 tablet 1     EPINEPHrine (ANY BX GENERIC EQUIV) 0.3 MG/0.3ML injection 2-pack Inject 0.3 mLs (0.3 mg) into the muscle as needed for anaphylaxis (Patient not taking: Reported on 12/26/2022) 1 each 1     Hydroxyurea 1000 MG TABS Take 3,000 mg by mouth daily 90 tablet 3     nitroFURantoin macrocrystal-monohydrate (MACROBID) 100 MG capsule Take 1 capsule (100 mg) by mouth 2 times daily for 5 days 10 capsule 0     ondansetron (ZOFRAN) 8 MG tablet Take 1 tablet (8 mg) by mouth every 8 hours as needed 30 tablet 1     oxyCODONE IR (ROXICODONE) 15 MG tablet Take 1 tablet (15 mg) by mouth every 4 hours as needed for severe pain (7-10) Goal 4 per day. Max 6 per day. 40 tablet 0       Past Medical History  Past Medical History:   Diagnosis Date     Anxiety      Bleeding disorder (H)      Blood clotting disorder (H)      Cerebral infarction (H) 2015     Cognitive developmental delay     low IQ. Please recognize when managing pain and planning with her     Depressive disorder      Hemiplegia and hemiparesis following cerebral infarction affecting right dominant side (H)     right hand contractures     Iron overload due to repeated red blood cell transfusions      Migraines      Multiple subsegmental pulmonary emboli without acute cor pulmonale (H) 02/01/2021     Oppositional  defiant behavior      Presence of intrauterine contraceptive device 2/18/2020     Superficial venous thrombosis of arm, right 03/25/2021     Uncomplicated asthma      Past Surgical History:   Procedure Laterality Date     AS INSERT TUNNELED CV 2 CATH W/O PORT/PUMP       CHOLECYSTECTOMY       CV RIGHT HEART CATH MEASUREMENTS RECORDED N/A 11/18/2021    Procedure: Right Heart Cath;  Surgeon: Jackson Stauffer MD;  Location:  HEART CARDIAC CATH LAB     INSERT PORT VASCULAR ACCESS Left 4/21/2021    Procedure: INSERTION, VASCULAR ACCESS PORT (NOT SURE ON SIDE UNTIL REMOVAL);  Surgeon: Rajan More MD;  Location: [facility] OR     IR CHEST PORT PLACEMENT > 5 YRS OF AGE  4/21/2021     IR CVC NON TUNNEL LINE REMOVAL  5/6/2021     IR CVC NON TUNNEL PLACEMENT > 5 YRS  04/07/2020     IR CVC NON TUNNEL PLACEMENT > 5 YRS  4/30/2021     IR CVC NON TUNNEL PLACEMENT > 5 YRS  9/7/2022     IR PORT REMOVAL LEFT  4/21/2021     REMOVE PORT VASCULAR ACCESS Left 4/21/2021    Procedure: REMOVAL, VASCULAR ACCESS PORT LEFT;  Surgeon: Rajan More MD;  Location: [facility] OR     REPAIR TENDON ELBOW Right 10/02/2019    Procedure: Right Elbow Flexor Lengthening, Flexor Pronator Slide Of Wrist and Finger, Thumb Adductor Lengthening;  Surgeon: Anai Franco MD;  Location: [facility] OR     TONSILLECTOMY Bilateral 10/02/2019    Procedure: Bilateral Tonsillectomy;  Surgeon: Farhana Guy MD;  Location: [facility] OR     ZZC BREAST REDUCTION (INCLUDES LIPO) TIER 3 Bilateral 04/23/2019     Allergies   Allergen Reactions     Contrast Dye      Hives and breathing issues     Fish-Derived Products Hives     Seafood Hives     Diagnostic X-Ray Materials      Gadolinium      Social History   Social History     Tobacco Use     Smoking status: Never     Smokeless tobacco: Never   Substance Use Topics     Alcohol use: Not Currently     Alcohol/week: 0.0 standard drinks     Drug use: Never    Still living at home with mom and extended family.     Past medical  history and social history were reviewed.    Physical Examination:  /62 (BP Location: Left arm, Patient Position: Sitting, Cuff Size: Adult Large)   Pulse 93   Temp 98.1  F (36.7  C) (Oral)   Resp 16   Wt 74.3 kg (163 lb 12.8 oz)   SpO2 91%   BMI 28.10 kg/m    Wt Readings from Last 10 Encounters:   01/02/23 74.3 kg (163 lb 12.8 oz)   12/24/22 74.8 kg (165 lb)   11/28/22 74.2 kg (163 lb 9.3 oz)   11/03/22 77.1 kg (170 lb)   10/12/22 75 kg (165 lb 4.8 oz)   09/28/22 73.9 kg (163 lb)   09/28/22 74.3 kg (163 lb 14.4 oz)   09/26/22 73.8 kg (162 lb 12.8 oz)   09/11/22 83.2 kg (183 lb 8 oz)   09/05/22 76 kg (167 lb 9.6 oz)     General: Well-appearing female in no acute distress. Smiling and engaging today  Eyes: EOMI, PERRL. No scleral icterus.  ENT: Oral mucosa is moist without lesions or thrush.   Cardiovascular: RRR No murmurs, gallops, or rubs. No peripheral edema.  Respiratory: CTA bilaterally. No wheezes or crackles.  MSK: Contractured right arm and hand 2/2 stroke. unchanged  Neurologic: Grossly nonfocal. Antalgic gait  Skin: No rashes, petechiae, or bruising noted on exposed skin.    Laboratory Data:   Latest Reference Range & Units 01/02/23 08:16   Sodium 136 - 145 mmol/L 137   Potassium 3.4 - 5.3 mmol/L 3.6   Chloride 98 - 107 mmol/L 107   Carbon Dioxide (CO2) 22 - 29 mmol/L 20 (L)   Urea Nitrogen 6.0 - 20.0 mg/dL 3.8 (L)   Creatinine 0.51 - 0.95 mg/dL 0.48 (L)   GFR Estimate >60 mL/min/1.73m2 >90   Calcium 8.6 - 10.0 mg/dL 8.7   Anion Gap 7 - 15 mmol/L 10   Albumin 3.5 - 5.2 g/dL 4.2   Protein Total 6.4 - 8.3 g/dL 7.0   Alkaline Phosphatase 35 - 104 U/L 77   ALT 10 - 35 U/L 21   AST 10 - 35 U/L 42 (H)   Bilirubin Total <=1.2 mg/dL 4.3 (H)   Ferritin 6 - 175 ng/mL 6,758 (H)   Glucose 70 - 99 mg/dL 109 (H)   WBC 4.0 - 11.0 10e3/uL 12.0 (H)   Hemoglobin 11.7 - 15.7 g/dL 7.0 (L)   Hematocrit 35.0 - 47.0 % 19.7 (L)   Platelet Count 150 - 450 10e3/uL 460 (H)   RBC Count 3.80 - 5.20 10e6/uL 2.25 (L)   MCV  78 - 100 fL 88   MCH 26.5 - 33.0 pg 31.1   MCHC 31.5 - 36.5 g/dL 35.5   RDW 10.0 - 15.0 % 26.1 (H)   Absolute NRBCs 10e3/uL 1.0   NRBCs per 100 WBC <1 /100 8 (H)   % Retic 0.5 - 2.0 % 22.5 (H)   Absolute Retic 0.025 - 0.095 10e6/uL 0.478 (H)   (L): Data is abnormally low  (H): Data is abnormally high     Cardiac MRI   Scan Date:   2022-11-17 08:39:48                                   Electronically signed by Lucas Ashanti H 2022-Nov-17 15:16:37     SUMMARY   ==========================================================================================================     Clinical history:  22-year-old woman with sickle cell disease. CMR to assess for cardiac siderosis.      Comparison CMR: 2021     1. The left ventricle is normal in cavity size and wall thickness. There are no regional wall motion abnormalities. The global systolic function is normal. The LVEF is 63%.   2. The right ventricle is normal in cavity size. The global systolic function is normal. The RVEF is 59%.    3. Both atria are normal in size.   4. There is no significant valvular disease.   5. There is no pericardial effusion.   6. The average T2* value is 38 ms (normal).      CONCLUSIONS:   Normal biventricular function with no evidence of cardiac siderosis. Biventricular function is stable.       MRI FOR LIVER IRON CONCENTRATION ([facility])     CLINICAL HISTORY:  Sickle cell disease with chronic severe iron overload on articulation therapy, evaluate for response     Comparison:  1/25/2022 [facility]  8/18/2021 [facility]     TECHNIQUE:  Sequential non-contrast spin-echo MRI imaging obtained of the liver with TR 2500 msec and progressively longer Miley up to 18 msec.     FINDINGS:  Average liver iron concentration is 31.6 mg/g dry tissue (normal =0.17 - 1.8), previously greater than 43  Average liver iron concentration is 566 mmol/kg dry tissue (normal = 3 - 33), previously greater than 770     Other findings: Marked loss of signal in the liver and spleen  consistent with diffuse iron deposition.                                                                      IMPRESSION:  Decreased iron deposition in the liver as detailed above compared to 1/25/2022 Brenda.     I have personally reviewed the examination and initial interpretation and I agree with the findings.     KALI ROSARIO MD          Assessment and Plan:  1. Sickle Cell HgbSS Disease  2. Recent hospitalization with acute chest (9/2022)  3. Chronic Pain  4. 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 History of CVA  7. Hearing loss    Jennifer has been feeling well since her last hospitalization with only two ED visits last month. She continues to optimize her home pain regimen in an effort to avoid ED visits.  It is worth remarking that for Jennifer, she went from just short of 100 ED and hospitalization visits in 2021 all the way to 19 visits in 2022.  I congratulated her on this feet, as its not easy to do.  Our current regimen has kept her both out of the hospital but also seeming to thrive and gain more confidence at home.    She would like to continue Desferal infusions and her last LIC had dropped again thankfully.    We should focus on making sure she gets COVID vaccinations or adding them to our immunization record if she has already gotten them.    Plan:  -Continue Hydrea to 3000mg daily to help lessen frequency of sickle cell pain  -Continue Oxycodone 15 mg q4h at home with Rx of 40 tabs/week. She can self-reduce infusion days/week but continue to keep the cap at 2/week for now.  As the summer rolls around, we will focus on starting to wean the oxycodone, perhaps by dose first and then pill count.  -Infusion center visits limited to two times per week (Mondays and Fridays). Per request, morphine changed to Dilaudid (1 mg) for infusion clinic to trial cycling of opioids. Continue diligent home management with current medications, heat, rest,  compression, warm baths.   -If unable to manage at home can go to ED but continue to not do IV narcotics in the emergency room. Ketamine previously added to pain plan in ED  -Continue Desferal infusions..   -Continue aspirin BID  -RTC in 4-6 weeks with Patricia Mantilla CNP.     40 minutes spent on the date of the encounter doing chart review, review of test results, interpretation of tests, patient visit and documentation         Eric Duncan MD  Classical Hematologist  Division of Hematology, Oncology, and Transplantation  [facility]  [facility]  Pager: [phone]

## 2023-01-02 NOTE — PROGRESS NOTES
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 She had multiple thromboembolic events in 2021 despite adherent anticoagulation use (though warfarin was perpetually low) and there are concerns for chronic thromboembolic disease but did not have pulmonary HTN on a November 2021 cath. She is maintained on chronic PO opioids and twice-weekly infusion visits (since 1/24/22) but has been able to be maintained on this regimen and has stayed out of the ED most of the time with even rarer admissions.    Interval History:  Jennifer is in clinic for one of her normal Monday transfusions.  She said the cold weather has been bothering her, but she is trying to stay out of the ED as much as possible.  She is willing to wait a bit on her oxycodone taper but has not needed any extra on top of her prescribed dosing.  She said that there is a bit of a fall out with her mom over the holidays, and she is still wanting to move out to her own place, but it probably will not be until the summer.  She does say that the Desferal infusions are going well.  No other missed doses of her medications.  She is continuing to work on her driving, as she knows that she will need it for living more independently.  For now though, she is prioritizing staying out of the cold weather.    In terms of her medical management, she had a couple requests today.  First, when I comes to her oxycodone weans in the summer, she wants me to be in charge of that.  She does not think she will  "need Suboxone but is open to guidance as we get closer to that time frame.  Second, she had a tendon release on her right side around 2020 with Dr. Franco.  She has also been getting Botox injections with Dr. Pierce.  She would like to see Dr. Franco again soon and is requesting a referral.    Sickle Cell Disease Comprehensive Checklist    Bone Health/Avascular Necrosis Screening/Symptoms (each visit): no new concerns today    Leg Ulcer evaluation (every visit): none    Hypertension (every visit):stable, high normal    Last pulmonary evaluation (asthma, AMAN, pulm HTN): 9/28/22    Stroke/silent cerebral infarct Hx (Y/N): Yes TIA ~2014, first event ~age 1 with full stroke and R sided weakness    Last PCP Visit: 8/2020    Vaccines:  ? PCV13: 5/13/19  ? Pneumovax (PPSV23): 3/04, 10/09, 7/12/19 (next due 7/2024)  ? Menactra: 4/2010, 9/2015 (MCV done 8/16/21)  ? MenB: 2015/2019  ? Influenza: 11/22/22  ? COVID: none    Audiology (chelation): done 6/2020, normal.. However, on 6/7, \"While there is no significant change in grade on the CTCAE4.03 Scale, there were hearing changes bilaterally for both standard and extended high frequency audiometry.  Will need to determine if an ENT consult is advised due to the asymmetry in extended high frequency testing.\"     Plan last reviewed with patient: 7/11/22    Patient background: 21 yo F, enjoys movies and kids though there are times where she does not really want to talk to people. Does not have a lot of social support at home.     Sickle Cell Disease History  Primary Hematologist Team: Jose Rafael Duncan  PCP: none  Genotype: SS  Acute Pain Crisis Treatment: (avoiding IV opioids for now, which she has agreed to)    ER   o Oxycodone 15-20 mg x1  o Ketamine 4mg IV x 2--helps with opioid sparing  o Toradol 15 mg IV x1   o Maintenance IV fluids with LR  o Other: Zofran 8 mg IV PRN nausea    Inpatient:  o Home oxycodone/Oxycontin regimen, though home oxycodone dosing could be " increased to 20 mg to start  o PCA plan:   - None for now  o Other Medications: Zofran  o She has had success with ketamine starting at 4mg/h and advancing only to 6mg/h, as 8mg/h made her feel quite poorly..  o ASA  o Supportive Care: Docusate Senna  Chronic Pain Medications:    Home regimen oxycodone 15 mg p.o. q.4 hours p.r.n. breakthrough pain.  She also continues on Voltaren gel, and Zoloft among other medications.    -Also benefits from mental health visits, acupuncture  Baseline Hemoglobin: 7 g/dl without chronic transfusions  Hydroxyurea use: Yes  H/O blood transfusions: Yes, several (iron overload) Most recent 11/20/2021    H/O Transfusion Reactions: no    Antibodies:none  H/O Acute Chest Syndrome: Yes    Last episode:9/05/22 (previously 4/26/21, 10/2019)     ICU/intubation: not with 9/2022 admission  H/O Stroke: Yes (managed with chronic transfusions in the past, stopped late Spring 2020)  H/O VTE: Yes (2/2021)  H/O Cholecystectomy or Splenectomy: Cholecystectomy  H/O Asthma, Pulm HTN, AVN, Leg Ulcers, Nephropathy, Retinopathy, etc: Iron overload, asthma, chronic lung disease, physical limitations from early stroke    ---------------------------------------  Jennifer Cervantes's Goals (discussed 11/17/22--confirmed 1/2/23)    1-3 month goal:  Continue journaling and reading.    6 month goal:  Continue to work on driving     12 month goal:  Move into her own place     Disease-specific goal(s):  Continue to stay out of the hospital, be off regular opioids in the future (previous goal was March 2023, now more likely June 2023)  ---------------------------------------      Current Outpatient Medications   Medication Sig Dispense Refill     acetaminophen (TYLENOL) 325 MG tablet Take 2 tablets (650 mg) by mouth every 6 hours as needed for mild pain 120 tablet 3     albuterol (PROVENTIL) (2.5 MG/3ML) 0.083% neb solution Take 2 vials (5 mg) by nebulization every 6 hours as needed for shortness of breath / dyspnea or  wheezing 90 mL 3     aspirin (ASA) 81 MG chewable tablet Take 1 tablet (81 mg) by mouth 2 times daily 60 tablet 11     budesonide-formoterol (SYMBICORT) 160-4.5 MCG/ACT Inhaler Inhale 2 puffs into the lungs 2 times daily 10.2 g 3     deferasirox (JADENU) 360 MG tablet Take 4 tablets (1,440 mg) by mouth every evening 120 tablet 4     Deferiprone, Twice Daily, 1000 MG TABS Take 1,500 mg by mouth 2 times daily 60 tablet 1     EPINEPHrine (ANY BX GENERIC EQUIV) 0.3 MG/0.3ML injection 2-pack Inject 0.3 mLs (0.3 mg) into the muscle as needed for anaphylaxis (Patient not taking: Reported on 12/26/2022) 1 each 1     Hydroxyurea 1000 MG TABS Take 3,000 mg by mouth daily 90 tablet 3     nitroFURantoin macrocrystal-monohydrate (MACROBID) 100 MG capsule Take 1 capsule (100 mg) by mouth 2 times daily for 5 days 10 capsule 0     ondansetron (ZOFRAN) 8 MG tablet Take 1 tablet (8 mg) by mouth every 8 hours as needed 30 tablet 1     oxyCODONE IR (ROXICODONE) 15 MG tablet Take 1 tablet (15 mg) by mouth every 4 hours as needed for severe pain (7-10) Goal 4 per day. Max 6 per day. 40 tablet 0       Past Medical History  Past Medical History:   Diagnosis Date     Anxiety      Bleeding disorder (H)      Blood clotting disorder (H)      Cerebral infarction (H) 2015     Cognitive developmental delay     low IQ. Please recognize when managing pain and planning with her     Depressive disorder      Hemiplegia and hemiparesis following cerebral infarction affecting right dominant side (H)     right hand contractures     Iron overload due to repeated red blood cell transfusions      Migraines      Multiple subsegmental pulmonary emboli without acute cor pulmonale (H) 02/01/2021     Oppositional defiant behavior      Presence of intrauterine contraceptive device 2/18/2020     Superficial venous thrombosis of arm, right 03/25/2021     Uncomplicated asthma      Past Surgical History:   Procedure Laterality Date     AS INSERT TUNNELED CV 2 CATH  W/O PORT/PUMP       CHOLECYSTECTOMY       CV RIGHT HEART CATH MEASUREMENTS RECORDED N/A 11/18/2021    Procedure: Right Heart Cath;  Surgeon: Jackson Stauffer MD;  Location:  HEART CARDIAC CATH LAB     INSERT PORT VASCULAR ACCESS Left 4/21/2021    Procedure: INSERTION, VASCULAR ACCESS PORT (NOT SURE ON SIDE UNTIL REMOVAL);  Surgeon: Rajan More MD;  Location: [facility] OR     IR CHEST PORT PLACEMENT > 5 YRS OF AGE  4/21/2021     IR CVC NON TUNNEL LINE REMOVAL  5/6/2021     IR CVC NON TUNNEL PLACEMENT > 5 YRS  04/07/2020     IR CVC NON TUNNEL PLACEMENT > 5 YRS  4/30/2021     IR CVC NON TUNNEL PLACEMENT > 5 YRS  9/7/2022     IR PORT REMOVAL LEFT  4/21/2021     REMOVE PORT VASCULAR ACCESS Left 4/21/2021    Procedure: REMOVAL, VASCULAR ACCESS PORT LEFT;  Surgeon: Rajan More MD;  Location: [facility] OR     REPAIR TENDON ELBOW Right 10/02/2019    Procedure: Right Elbow Flexor Lengthening, Flexor Pronator Slide Of Wrist and Finger, Thumb Adductor Lengthening;  Surgeon: Anai Franco MD;  Location: [facility] OR     TONSILLECTOMY Bilateral 10/02/2019    Procedure: Bilateral Tonsillectomy;  Surgeon: Farhana Guy MD;  Location: [facility] OR     ZZC BREAST REDUCTION (INCLUDES LIPO) TIER 3 Bilateral 04/23/2019     Allergies   Allergen Reactions     Contrast Dye      Hives and breathing issues     Fish-Derived Products Hives     Seafood Hives     Diagnostic X-Ray Materials      Gadolinium      Social History   Social History     Tobacco Use     Smoking status: Never     Smokeless tobacco: Never   Substance Use Topics     Alcohol use: Not Currently     Alcohol/week: 0.0 standard drinks     Drug use: Never    Still living at home with mom and extended family.     Past medical history and social history were reviewed.    Physical Examination:  /62 (BP Location: Left arm, Patient Position: Sitting, Cuff Size: Adult Large)   Pulse 93   Temp 98.1  F (36.7  C) (Oral)   Resp 16   Wt 74.3 kg (163 lb 12.8 oz)   SpO2  91%   BMI 28.10 kg/m    Wt Readings from Last 10 Encounters:   01/02/23 74.3 kg (163 lb 12.8 oz)   12/24/22 74.8 kg (165 lb)   11/28/22 74.2 kg (163 lb 9.3 oz)   11/03/22 77.1 kg (170 lb)   10/12/22 75 kg (165 lb 4.8 oz)   09/28/22 73.9 kg (163 lb)   09/28/22 74.3 kg (163 lb 14.4 oz)   09/26/22 73.8 kg (162 lb 12.8 oz)   09/11/22 83.2 kg (183 lb 8 oz)   09/05/22 76 kg (167 lb 9.6 oz)     General: Well-appearing female in no acute distress. Smiling and engaging today  Eyes: EOMI, PERRL. No scleral icterus.  ENT: Oral mucosa is moist without lesions or thrush.   Cardiovascular: RRR No murmurs, gallops, or rubs. No peripheral edema.  Respiratory: CTA bilaterally. No wheezes or crackles.  MSK: Contractured right arm and hand 2/2 stroke. unchanged  Neurologic: Grossly nonfocal. Antalgic gait  Skin: No rashes, petechiae, or bruising noted on exposed skin.    Laboratory Data:   Latest Reference Range & Units 01/02/23 08:16   Sodium 136 - 145 mmol/L 137   Potassium 3.4 - 5.3 mmol/L 3.6   Chloride 98 - 107 mmol/L 107   Carbon Dioxide (CO2) 22 - 29 mmol/L 20 (L)   Urea Nitrogen 6.0 - 20.0 mg/dL 3.8 (L)   Creatinine 0.51 - 0.95 mg/dL 0.48 (L)   GFR Estimate >60 mL/min/1.73m2 >90   Calcium 8.6 - 10.0 mg/dL 8.7   Anion Gap 7 - 15 mmol/L 10   Albumin 3.5 - 5.2 g/dL 4.2   Protein Total 6.4 - 8.3 g/dL 7.0   Alkaline Phosphatase 35 - 104 U/L 77   ALT 10 - 35 U/L 21   AST 10 - 35 U/L 42 (H)   Bilirubin Total <=1.2 mg/dL 4.3 (H)   Ferritin 6 - 175 ng/mL 6,758 (H)   Glucose 70 - 99 mg/dL 109 (H)   WBC 4.0 - 11.0 10e3/uL 12.0 (H)   Hemoglobin 11.7 - 15.7 g/dL 7.0 (L)   Hematocrit 35.0 - 47.0 % 19.7 (L)   Platelet Count 150 - 450 10e3/uL 460 (H)   RBC Count 3.80 - 5.20 10e6/uL 2.25 (L)   MCV 78 - 100 fL 88   MCH 26.5 - 33.0 pg 31.1   MCHC 31.5 - 36.5 g/dL 35.5   RDW 10.0 - 15.0 % 26.1 (H)   Absolute NRBCs 10e3/uL 1.0   NRBCs per 100 WBC <1 /100 8 (H)   % Retic 0.5 - 2.0 % 22.5 (H)   Absolute Retic 0.025 - 0.095 10e6/uL 0.478 (H)   (L):  Data is abnormally low  (H): Data is abnormally high     Cardiac MRI   Scan Date:   2022-11-17 08:39:48                                   Electronically signed by Ashanti Zavala 2022-Nov-17 15:16:37     SUMMARY   ==========================================================================================================     Clinical history:  22-year-old woman with sickle cell disease. CMR to assess for cardiac siderosis.      Comparison CMR: 2021     1. The left ventricle is normal in cavity size and wall thickness. There are no regional wall motion abnormalities. The global systolic function is normal. The LVEF is 63%.   2. The right ventricle is normal in cavity size. The global systolic function is normal. The RVEF is 59%.    3. Both atria are normal in size.   4. There is no significant valvular disease.   5. There is no pericardial effusion.   6. The average T2* value is 38 ms (normal).      CONCLUSIONS:   Normal biventricular function with no evidence of cardiac siderosis. Biventricular function is stable.       MRI FOR LIVER IRON CONCENTRATION ([facility])     CLINICAL HISTORY:  Sickle cell disease with chronic severe iron overload on articulation therapy, evaluate for response     Comparison:  1/25/2022 [facility]  8/18/2021 [facility]     TECHNIQUE:  Sequential non-contrast spin-echo MRI imaging obtained of the liver with TR 2500 msec and progressively longer Miley up to 18 msec.     FINDINGS:  Average liver iron concentration is 31.6 mg/g dry tissue (normal =0.17 - 1.8), previously greater than 43  Average liver iron concentration is 566 mmol/kg dry tissue (normal = 3 - 33), previously greater than 770     Other findings: Marked loss of signal in the liver and spleen consistent with diffuse iron deposition.                                                                      IMPRESSION:  Decreased iron deposition in the liver as detailed above compared to 1/25/2022 [facility].     I have personally reviewed the  examination and initial interpretation and I agree with the findings.     KALI ROSARIO MD          Assessment and Plan:  1. Sickle Cell HgbSS Disease  2. Recent hospitalization with acute chest (9/2022)  3. Chronic Pain  4. 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 History of CVA  7. Hearing loss    Jennifer has been feeling well since her last hospitalization with only two ED visits last month. She continues to optimize her home pain regimen in an effort to avoid ED visits.  It is worth remarking that for Jennifer, she went from just short of 100 ED and hospitalization visits in 2021 all the way to 19 visits in 2022.  I congratulated her on this feet, as its not easy to do.  Our current regimen has kept her both out of the hospital but also seeming to thrive and gain more confidence at home.    She would like to continue Desferal infusions and her last LIC had dropped again thankfully.    We should focus on making sure she gets COVID vaccinations or adding them to our immunization record if she has already gotten them.    Plan:  -Continue Hydrea to 3000mg daily to help lessen frequency of sickle cell pain  -Continue Oxycodone 15 mg q4h at home with Rx of 40 tabs/week. She can self-reduce infusion days/week but continue to keep the cap at 2/week for now.  As the summer rolls around, we will focus on starting to wean the oxycodone, perhaps by dose first and then pill count.  -Infusion center visits limited to two times per week (Mondays and Fridays). Per request, morphine changed to Dilaudid (1 mg) for infusion clinic to trial cycling of opioids. Continue diligent home management with current medications, heat, rest, compression, warm baths.   -If unable to manage at home can go to ED but continue to not do IV narcotics in the emergency room. Ketamine previously added to pain plan in ED  -Continue Desferal infusions..   -Continue aspirin BID  -RTC in 4-6 weeks  with Patricia Mantilla CNP.     40 minutes spent on the date of the encounter doing chart review, review of test results, interpretation of tests, patient visit and documentation         Eric Duncan MD  Classical Hematologist  Division of Hematology, Oncology, and Transplantation  [facility]  [facility]  Pager: [phone]

## 2023-01-04 DIAGNOSIS — D57.00 SICKLE CELL PAIN CRISIS (H): ICD-10-CM

## 2023-01-04 RX ORDER — OXYCODONE HYDROCHLORIDE 15 MG/1
15 TABLET ORAL EVERY 4 HOURS PRN
Qty: 40 TABLET | Refills: 0 | Status: ON HOLD | OUTPATIENT
Start: 2023-01-04 | End: 2023-01-12

## 2023-01-05 ENCOUNTER — TELEPHONE (OUTPATIENT)
Dept: ONCOLOGY | Facility: CLINIC | Age: 23
End: 2023-01-05

## 2023-01-05 NOTE — TELEPHONE ENCOUNTER
[facility] Telephone Triage Note    The following symptoms were reported:     Description:            Onset:  Last night   Location: Lower back  Character: Sharp           Intensity: 9/10    Accompanying Signs & Symptoms:  none    Chest Pain:       Shortness of Breath:       Fever:       Chills:     Cough/sore throat:    Other:      Therapies Tried and outcome: Heat & home pain meds, only slight relief    Improved by: heat, minimally    The following provider was consulted:  None per protocol     The following advice/orders were given, and/or interventions recommended:        Patient instructions and/or follow up:  Pt called back, was informed of lack of infusion space.

## 2023-01-06 ENCOUNTER — TELEPHONE (OUTPATIENT)
Dept: ONCOLOGY | Facility: CLINIC | Age: 23
End: 2023-01-06

## 2023-01-06 NOTE — TELEPHONE ENCOUNTER
Oncology Nurse Triage - Sickle Cell Pain Crisis:    Situation: Jennifer  calling about Sickle Cell Pain Crisis    Background:     Patient's last infusion was 1/2/2023  Last clinic visit date:1/2/2023   Next follow-up appt on pending just had visit this week  Does patient have active treatment plan?  Yes    (If pt has been seen in ED or infusion in last 3 days or no showed last clinic visit then does not meet protocol unless specified in pt therapy plan)    Assessment of Symptoms:  Onset/Duration of symptoms: 2 day    Is it typical sickle cell pain? Yes   Location: lower back  Character: Sharp           Intensity: 10/10    Any radiation of pain, numbness, tingling, weakness, warmth, swelling, discoloration of extremities?No     Fever?No  (if yes max temperature recorded in last 24 hours):      Chest Pain Present: No     Shortness of breath: No     Other home therapies tried: HEAT/HEATING PAD     Last home medication taken and when: Oxycodone 15mg at 6am    Any Refills Needed?: No     Does patient have transportation & length of time to get to clinic: Yes       Grades for reference:       Grade 1 -   o Patient has active treatment plan.   o Patients last scheduled clinic appointment was attended  o Patient has not been seen in infusion or ED in the last 3 days (clarify exclusions in therapy plans)   o Afebrile   o Typically sickle cell pain   o Home medications have been tried   o No other symptoms       Grade 2 -   o Temperature of over 100.0   o Different sickle cell pain   o Decreased PO intake   o Arm or left swelling   o ED or infusion visit within the last 3 days.   o Out of home medications      Grade 3 -   o New chest pain   o New shortness of breath  o Change in mental status     Recommendations:       If you do not hear from the infusion center by 2pm then you will not be able to get in for an infusion today. If symptoms worsen while waiting for call back, and/or you experience fever, chills, SOB, chest pain,  cough, n/v, dizziness, numbness, swelling, discoloration of extremities, then seek emergency evaluation in Emergency Department.     Please note, if you are late for your appt, you risk losing your infusion appt as it may delay another patient's infusion who arrived on time.

## 2023-01-08 ENCOUNTER — APPOINTMENT (OUTPATIENT)
Dept: NUCLEAR MEDICINE | Facility: CLINIC | Age: 23
DRG: 811 | End: 2023-01-08
Attending: EMERGENCY MEDICINE
Payer: COMMERCIAL

## 2023-01-08 ENCOUNTER — HOSPITAL ENCOUNTER (INPATIENT)
Facility: CLINIC | Age: 23
LOS: 8 days | Discharge: HOME | DRG: 811 | End: 2023-01-16
Attending: EMERGENCY MEDICINE | Admitting: STUDENT IN AN ORGANIZED HEALTH CARE EDUCATION/TRAINING PROGRAM
Payer: COMMERCIAL

## 2023-01-08 ENCOUNTER — APPOINTMENT (OUTPATIENT)
Dept: GENERAL RADIOLOGY | Facility: CLINIC | Age: 23
DRG: 811 | End: 2023-01-08
Attending: EMERGENCY MEDICINE
Payer: COMMERCIAL

## 2023-01-08 DIAGNOSIS — D57.00 SICKLE CELL PAIN CRISIS (H): ICD-10-CM

## 2023-01-08 DIAGNOSIS — Z20.822 LAB TEST NEGATIVE FOR COVID-19 VIRUS: ICD-10-CM

## 2023-01-08 DIAGNOSIS — R09.02 HYPOXIA: Primary | ICD-10-CM

## 2023-01-08 DIAGNOSIS — F41.9 ANXIETY: ICD-10-CM

## 2023-01-08 DIAGNOSIS — D57.01 ACUTE CHEST SYNDROME (H): ICD-10-CM

## 2023-01-08 DIAGNOSIS — J96.01 ACUTE RESPIRATORY FAILURE WITH HYPOXIA (H): ICD-10-CM

## 2023-01-08 DIAGNOSIS — D57.00 SICKLE CELL CRISIS (H): ICD-10-CM

## 2023-01-08 LAB
ALBUMIN SERPL BCG-MCNC: 4 G/DL (ref 3.5–5.2)
ALP SERPL-CCNC: 80 U/L (ref 35–104)
ALT SERPL W P-5'-P-CCNC: 27 U/L (ref 10–35)
ANION GAP SERPL CALCULATED.3IONS-SCNC: 13 MMOL/L (ref 7–15)
AST SERPL W P-5'-P-CCNC: 48 U/L (ref 10–35)
BASOPHILS # BLD AUTO: 0.2 10E3/UL (ref 0–0.2)
BASOPHILS NFR BLD AUTO: 1 %
BILIRUB SERPL-MCNC: 3 MG/DL
BUN SERPL-MCNC: 12.2 MG/DL (ref 6–20)
CALCIUM SERPL-MCNC: 8.5 MG/DL (ref 8.6–10)
CHLORIDE SERPL-SCNC: 103 MMOL/L (ref 98–107)
CREAT SERPL-MCNC: 0.59 MG/DL (ref 0.51–0.95)
D DIMER PPP FEU-MCNC: 3.14 UG/ML FEU (ref 0–0.5)
DEPRECATED HCO3 PLAS-SCNC: 16 MMOL/L (ref 22–29)
EOSINOPHIL # BLD AUTO: 0 10E3/UL (ref 0–0.7)
EOSINOPHIL NFR BLD AUTO: 0 %
ERYTHROCYTE [DISTWIDTH] IN BLOOD BY AUTOMATED COUNT: 25.2 % (ref 10–15)
FLUAV RNA SPEC QL NAA+PROBE: NEGATIVE
FLUBV RNA RESP QL NAA+PROBE: NEGATIVE
GFR SERPL CREATININE-BSD FRML MDRD: >90 ML/MIN/1.73M2
GLUCOSE SERPL-MCNC: 94 MG/DL (ref 70–99)
HCG SERPL QL: NEGATIVE
HCT VFR BLD AUTO: 20.4 % (ref 35–47)
HGB BLD-MCNC: 7.4 G/DL (ref 11.7–15.7)
IMM GRANULOCYTES # BLD: 0.4 10E3/UL
IMM GRANULOCYTES NFR BLD: 2 %
LYMPHOCYTES # BLD AUTO: 1 10E3/UL (ref 0.8–5.3)
LYMPHOCYTES NFR BLD AUTO: 5 %
MCH RBC QN AUTO: 32 PG (ref 26.5–33)
MCHC RBC AUTO-ENTMCNC: 36.3 G/DL (ref 31.5–36.5)
MCV RBC AUTO: 88 FL (ref 78–100)
MONOCYTES # BLD AUTO: 1 10E3/UL (ref 0–1.3)
MONOCYTES NFR BLD AUTO: 5 %
NEUTROPHILS # BLD AUTO: 17.9 10E3/UL (ref 1.6–8.3)
NEUTROPHILS NFR BLD AUTO: 87 %
NRBC # BLD AUTO: 0.9 10E3/UL
NRBC BLD AUTO-RTO: 4 /100
PLATELET # BLD AUTO: 411 10E3/UL (ref 150–450)
POTASSIUM SERPL-SCNC: 3.5 MMOL/L (ref 3.4–5.3)
PROT SERPL-MCNC: 6.9 G/DL (ref 6.4–8.3)
RBC # BLD AUTO: 2.31 10E6/UL (ref 3.8–5.2)
RETICS # AUTO: 0.36 10E6/UL (ref 0.03–0.1)
RETICS/RBC NFR AUTO: 15.8 % (ref 0.5–2)
RSV RNA SPEC NAA+PROBE: NEGATIVE
SARS-COV-2 RNA RESP QL NAA+PROBE: NEGATIVE
SODIUM SERPL-SCNC: 132 MMOL/L (ref 136–145)
TROPONIN T SERPL HS-MCNC: <6 NG/L
WBC # BLD AUTO: 20.4 10E3/UL (ref 4–11)

## 2023-01-08 PROCEDURE — 96376 TX/PRO/DX INJ SAME DRUG ADON: CPT | Performed by: EMERGENCY MEDICINE

## 2023-01-08 PROCEDURE — 93010 ELECTROCARDIOGRAM REPORT: CPT | Performed by: EMERGENCY MEDICINE

## 2023-01-08 PROCEDURE — A9567 TECHNETIUM TC-99M AEROSOL: HCPCS | Performed by: STUDENT IN AN ORGANIZED HEALTH CARE EDUCATION/TRAINING PROGRAM

## 2023-01-08 PROCEDURE — 96375 TX/PRO/DX INJ NEW DRUG ADDON: CPT | Performed by: EMERGENCY MEDICINE

## 2023-01-08 PROCEDURE — 99285 EMERGENCY DEPT VISIT HI MDM: CPT | Mod: 25 | Performed by: EMERGENCY MEDICINE

## 2023-01-08 PROCEDURE — 272N000035 NM LUNG SCAN VENTILATION AND PERFUSION

## 2023-01-08 PROCEDURE — 84484 ASSAY OF TROPONIN QUANT: CPT | Performed by: EMERGENCY MEDICINE

## 2023-01-08 PROCEDURE — 96361 HYDRATE IV INFUSION ADD-ON: CPT | Performed by: EMERGENCY MEDICINE

## 2023-01-08 PROCEDURE — 250N000009 HC RX 250: Performed by: STUDENT IN AN ORGANIZED HEALTH CARE EDUCATION/TRAINING PROGRAM

## 2023-01-08 PROCEDURE — 343N000001 HC RX 343: Performed by: STUDENT IN AN ORGANIZED HEALTH CARE EDUCATION/TRAINING PROGRAM

## 2023-01-08 PROCEDURE — 85045 AUTOMATED RETICULOCYTE COUNT: CPT | Performed by: STUDENT IN AN ORGANIZED HEALTH CARE EDUCATION/TRAINING PROGRAM

## 2023-01-08 PROCEDURE — 85379 FIBRIN DEGRADATION QUANT: CPT | Performed by: EMERGENCY MEDICINE

## 2023-01-08 PROCEDURE — 78582 LUNG VENTILAT&PERFUS IMAGING: CPT | Mod: 26 | Performed by: RADIOLOGY

## 2023-01-08 PROCEDURE — 85025 COMPLETE CBC W/AUTO DIFF WBC: CPT | Performed by: EMERGENCY MEDICINE

## 2023-01-08 PROCEDURE — 96374 THER/PROPH/DIAG INJ IV PUSH: CPT | Performed by: EMERGENCY MEDICINE

## 2023-01-08 PROCEDURE — 250N000011 HC RX IP 250 OP 636: Performed by: STUDENT IN AN ORGANIZED HEALTH CARE EDUCATION/TRAINING PROGRAM

## 2023-01-08 PROCEDURE — 71046 X-RAY EXAM CHEST 2 VIEWS: CPT

## 2023-01-08 PROCEDURE — 999N000127 HC STATISTIC PERIPHERAL IV START W US GUIDANCE

## 2023-01-08 PROCEDURE — 250N000013 HC RX MED GY IP 250 OP 250 PS 637: Performed by: STUDENT IN AN ORGANIZED HEALTH CARE EDUCATION/TRAINING PROGRAM

## 2023-01-08 PROCEDURE — 93005 ELECTROCARDIOGRAM TRACING: CPT | Performed by: EMERGENCY MEDICINE

## 2023-01-08 PROCEDURE — 87637 SARSCOV2&INF A&B&RSV AMP PRB: CPT | Performed by: EMERGENCY MEDICINE

## 2023-01-08 PROCEDURE — 250N000013 HC RX MED GY IP 250 OP 250 PS 637: Performed by: EMERGENCY MEDICINE

## 2023-01-08 PROCEDURE — 258N000003 HC RX IP 258 OP 636: Performed by: STUDENT IN AN ORGANIZED HEALTH CARE EDUCATION/TRAINING PROGRAM

## 2023-01-08 PROCEDURE — 80053 COMPREHEN METABOLIC PANEL: CPT | Performed by: EMERGENCY MEDICINE

## 2023-01-08 PROCEDURE — 120N000002 HC R&B MED SURG/OB UMMC

## 2023-01-08 PROCEDURE — 250N000009 HC RX 250: Performed by: EMERGENCY MEDICINE

## 2023-01-08 PROCEDURE — 71046 X-RAY EXAM CHEST 2 VIEWS: CPT | Mod: 26 | Performed by: RADIOLOGY

## 2023-01-08 PROCEDURE — 84703 CHORIONIC GONADOTROPIN ASSAY: CPT | Performed by: EMERGENCY MEDICINE

## 2023-01-08 PROCEDURE — 99207 PR SC NO CHARGE VISIT: CPT | Performed by: PHYSICIAN ASSISTANT

## 2023-01-08 PROCEDURE — A9540 TC99M MAA: HCPCS | Performed by: STUDENT IN AN ORGANIZED HEALTH CARE EDUCATION/TRAINING PROGRAM

## 2023-01-08 PROCEDURE — 250N000011 HC RX IP 250 OP 636: Performed by: EMERGENCY MEDICINE

## 2023-01-08 PROCEDURE — C9803 HOPD COVID-19 SPEC COLLECT: HCPCS | Performed by: EMERGENCY MEDICINE

## 2023-01-08 PROCEDURE — 36415 COLL VENOUS BLD VENIPUNCTURE: CPT | Performed by: EMERGENCY MEDICINE

## 2023-01-08 PROCEDURE — 99223 1ST HOSP IP/OBS HIGH 75: CPT | Mod: AI | Performed by: STUDENT IN AN ORGANIZED HEALTH CARE EDUCATION/TRAINING PROGRAM

## 2023-01-08 PROCEDURE — 99222 1ST HOSP IP/OBS MODERATE 55: CPT | Performed by: PHYSICIAN ASSISTANT

## 2023-01-08 PROCEDURE — 258N000003 HC RX IP 258 OP 636: Performed by: EMERGENCY MEDICINE

## 2023-01-08 RX ORDER — MAGNESIUM HYDROXIDE/ALUMINUM HYDROXICE/SIMETHICONE 120; 1200; 1200 MG/30ML; MG/30ML; MG/30ML
30 SUSPENSION ORAL EVERY 6 HOURS PRN
Status: DISCONTINUED | OUTPATIENT
Start: 2023-01-08 | End: 2023-01-16 | Stop reason: HOSPADM

## 2023-01-08 RX ORDER — FLUTICASONE FUROATE AND VILANTEROL 200; 25 UG/1; UG/1
1 POWDER RESPIRATORY (INHALATION) DAILY
Status: DISCONTINUED | OUTPATIENT
Start: 2023-01-08 | End: 2023-01-16 | Stop reason: HOSPADM

## 2023-01-08 RX ORDER — MORPHINE SULFATE 2 MG/ML
2 INJECTION, SOLUTION INTRAMUSCULAR; INTRAVENOUS EVERY 30 MIN PRN
Status: DISCONTINUED | OUTPATIENT
Start: 2023-01-08 | End: 2023-01-09

## 2023-01-08 RX ORDER — FOLIC ACID 1 MG/1
1 TABLET ORAL DAILY
Status: DISCONTINUED | OUTPATIENT
Start: 2023-01-08 | End: 2023-01-16 | Stop reason: HOSPADM

## 2023-01-08 RX ORDER — SODIUM CHLORIDE, SODIUM LACTATE, POTASSIUM CHLORIDE, CALCIUM CHLORIDE 600; 310; 30; 20 MG/100ML; MG/100ML; MG/100ML; MG/100ML
INJECTION, SOLUTION INTRAVENOUS CONTINUOUS
Status: DISCONTINUED | OUTPATIENT
Start: 2023-01-08 | End: 2023-01-09

## 2023-01-08 RX ORDER — KETOROLAC TROMETHAMINE 30 MG/ML
30 INJECTION, SOLUTION INTRAMUSCULAR; INTRAVENOUS EVERY 6 HOURS
Status: COMPLETED | OUTPATIENT
Start: 2023-01-08 | End: 2023-01-09

## 2023-01-08 RX ORDER — FLUOXETINE 10 MG/1
10 CAPSULE ORAL DAILY
Status: DISCONTINUED | OUTPATIENT
Start: 2023-01-09 | End: 2023-01-16 | Stop reason: HOSPADM

## 2023-01-08 RX ORDER — ONDANSETRON 4 MG/1
8 TABLET, FILM COATED ORAL EVERY 8 HOURS PRN
Status: DISCONTINUED | OUTPATIENT
Start: 2023-01-08 | End: 2023-01-16 | Stop reason: HOSPADM

## 2023-01-08 RX ORDER — LIDOCAINE 40 MG/G
CREAM TOPICAL
Status: DISCONTINUED | OUTPATIENT
Start: 2023-01-08 | End: 2023-01-16 | Stop reason: HOSPADM

## 2023-01-08 RX ORDER — AMOXICILLIN 250 MG
2 CAPSULE ORAL 2 TIMES DAILY
Status: DISCONTINUED | OUTPATIENT
Start: 2023-01-08 | End: 2023-01-16 | Stop reason: HOSPADM

## 2023-01-08 RX ORDER — AMOXICILLIN 250 MG
1 CAPSULE ORAL 2 TIMES DAILY
Status: DISCONTINUED | OUTPATIENT
Start: 2023-01-08 | End: 2023-01-13

## 2023-01-08 RX ORDER — HYDROXYUREA 500 MG/1
3000 CAPSULE ORAL DAILY
Status: DISCONTINUED | OUTPATIENT
Start: 2023-01-09 | End: 2023-01-16 | Stop reason: HOSPADM

## 2023-01-08 RX ORDER — CEFTRIAXONE 1 G/1
1 INJECTION, POWDER, FOR SOLUTION INTRAMUSCULAR; INTRAVENOUS EVERY 24 HOURS
Status: DISCONTINUED | OUTPATIENT
Start: 2023-01-09 | End: 2023-01-12

## 2023-01-08 RX ORDER — DOXYCYCLINE 100 MG/1
100 CAPSULE ORAL EVERY 12 HOURS SCHEDULED
Status: DISCONTINUED | OUTPATIENT
Start: 2023-01-08 | End: 2023-01-12

## 2023-01-08 RX ORDER — ALBUTEROL SULFATE 0.83 MG/ML
5 SOLUTION RESPIRATORY (INHALATION) EVERY 6 HOURS PRN
Status: DISCONTINUED | OUTPATIENT
Start: 2023-01-08 | End: 2023-01-16 | Stop reason: HOSPADM

## 2023-01-08 RX ORDER — DOXYCYCLINE 100 MG/1
100 CAPSULE ORAL ONCE
Status: COMPLETED | OUTPATIENT
Start: 2023-01-08 | End: 2023-01-08

## 2023-01-08 RX ORDER — ASPIRIN 81 MG/1
81 TABLET, CHEWABLE ORAL 2 TIMES DAILY
Status: DISCONTINUED | OUTPATIENT
Start: 2023-01-08 | End: 2023-01-16 | Stop reason: HOSPADM

## 2023-01-08 RX ORDER — TRAZODONE HYDROCHLORIDE 50 MG/1
50 TABLET, FILM COATED ORAL AT BEDTIME
Status: DISCONTINUED | OUTPATIENT
Start: 2023-01-08 | End: 2023-01-16 | Stop reason: HOSPADM

## 2023-01-08 RX ORDER — CEFTRIAXONE 1 G/1
1 INJECTION, POWDER, FOR SOLUTION INTRAMUSCULAR; INTRAVENOUS ONCE
Status: COMPLETED | OUTPATIENT
Start: 2023-01-08 | End: 2023-01-08

## 2023-01-08 RX ORDER — BUDESONIDE AND FORMOTEROL FUMARATE DIHYDRATE 160; 4.5 UG/1; UG/1
2 AEROSOL RESPIRATORY (INHALATION) 2 TIMES DAILY
Status: DISCONTINUED | OUTPATIENT
Start: 2023-01-08 | End: 2023-01-08

## 2023-01-08 RX ORDER — KETOROLAC TROMETHAMINE 15 MG/ML
15 INJECTION, SOLUTION INTRAMUSCULAR; INTRAVENOUS ONCE
Status: COMPLETED | OUTPATIENT
Start: 2023-01-08 | End: 2023-01-08

## 2023-01-08 RX ORDER — OXYCODONE HYDROCHLORIDE 10 MG/1
20 TABLET ORAL ONCE
Status: COMPLETED | OUTPATIENT
Start: 2023-01-08 | End: 2023-01-08

## 2023-01-08 RX ORDER — OXYCODONE HCL 10 MG/1
10 TABLET, FILM COATED, EXTENDED RELEASE ORAL 2 TIMES DAILY
Status: CANCELLED | OUTPATIENT
Start: 2023-01-08

## 2023-01-08 RX ORDER — ACETAMINOPHEN 325 MG/1
975 TABLET ORAL EVERY 8 HOURS
Status: DISCONTINUED | OUTPATIENT
Start: 2023-01-08 | End: 2023-01-16 | Stop reason: HOSPADM

## 2023-01-08 RX ORDER — LANOLIN ALCOHOL/MO/W.PET/CERES
3 CREAM (GRAM) TOPICAL
Status: DISCONTINUED | OUTPATIENT
Start: 2023-01-08 | End: 2023-01-16 | Stop reason: HOSPADM

## 2023-01-08 RX ADMIN — MORPHINE SULFATE 2 MG: 2 INJECTION, SOLUTION INTRAMUSCULAR; INTRAVENOUS at 23:49

## 2023-01-08 RX ADMIN — KETOROLAC TROMETHAMINE 30 MG: 30 INJECTION, SOLUTION INTRAMUSCULAR; INTRAVENOUS at 14:30

## 2023-01-08 RX ADMIN — SODIUM CHLORIDE, POTASSIUM CHLORIDE, SODIUM LACTATE AND CALCIUM CHLORIDE: 600; 310; 30; 20 INJECTION, SOLUTION INTRAVENOUS at 21:40

## 2023-01-08 RX ADMIN — OXYCODONE HYDROCHLORIDE 20 MG: 10 TABLET ORAL at 12:37

## 2023-01-08 RX ADMIN — SODIUM CHLORIDE, POTASSIUM CHLORIDE, SODIUM LACTATE AND CALCIUM CHLORIDE: 600; 310; 30; 20 INJECTION, SOLUTION INTRAVENOUS at 15:27

## 2023-01-08 RX ADMIN — KETOROLAC TROMETHAMINE 15 MG: 15 INJECTION, SOLUTION INTRAMUSCULAR; INTRAVENOUS at 08:14

## 2023-01-08 RX ADMIN — OXYCODONE HYDROCHLORIDE 15 MG: 10 TABLET ORAL at 14:07

## 2023-01-08 RX ADMIN — Medication 4 MG: at 10:06

## 2023-01-08 RX ADMIN — KIT FOR THE PREPARATION OF TECHNETIUM TC 99M ALBUMIN AGGREGATED 6.2 MILLICURIE: 2.5 INJECTION, POWDER, FOR SOLUTION INTRAVENOUS at 17:07

## 2023-01-08 RX ADMIN — MORPHINE SULFATE 2 MG: 2 INJECTION, SOLUTION INTRAMUSCULAR; INTRAVENOUS at 19:56

## 2023-01-08 RX ADMIN — Medication 4 MG: at 08:31

## 2023-01-08 RX ADMIN — TRAZODONE HYDROCHLORIDE 50 MG: 50 TABLET ORAL at 23:49

## 2023-01-08 RX ADMIN — DOXYCYCLINE HYCLATE 100 MG: 100 CAPSULE ORAL at 12:26

## 2023-01-08 RX ADMIN — KETAMINE HYDROCHLORIDE 4 MG/HR: 100 INJECTION, SOLUTION, CONCENTRATE INTRAMUSCULAR; INTRAVENOUS at 17:48

## 2023-01-08 RX ADMIN — CEFTRIAXONE SODIUM 1 G: 1 INJECTION, POWDER, FOR SOLUTION INTRAMUSCULAR; INTRAVENOUS at 12:25

## 2023-01-08 RX ADMIN — MORPHINE SULFATE 2 MG: 2 INJECTION, SOLUTION INTRAMUSCULAR; INTRAVENOUS at 20:58

## 2023-01-08 RX ADMIN — SODIUM CHLORIDE, POTASSIUM CHLORIDE, SODIUM LACTATE AND CALCIUM CHLORIDE 500 ML: 600; 310; 30; 20 INJECTION, SOLUTION INTRAVENOUS at 08:11

## 2023-01-08 RX ADMIN — KIT FOR THE PREPARATION OF TECHNETIUM TC 99M PENTETATE 1.9 MILLICURIE: 20 INJECTION, POWDER, LYOPHILIZED, FOR SOLUTION INTRAVENOUS; RESPIRATORY (INHALATION) at 16:56

## 2023-01-08 RX ADMIN — SODIUM CHLORIDE, POTASSIUM CHLORIDE, SODIUM LACTATE AND CALCIUM CHLORIDE 500 ML: 600; 310; 30; 20 INJECTION, SOLUTION INTRAVENOUS at 14:15

## 2023-01-08 RX ADMIN — DOXYCYCLINE HYCLATE 100 MG: 100 CAPSULE ORAL at 20:09

## 2023-01-08 RX ADMIN — KETOROLAC TROMETHAMINE 30 MG: 30 INJECTION, SOLUTION INTRAMUSCULAR; INTRAVENOUS at 20:39

## 2023-01-08 ASSESSMENT — ACTIVITIES OF DAILY LIVING (ADL)
CHANGE_IN_FUNCTIONAL_STATUS_SINCE_ONSET_OF_CURRENT_ILLNESS/INJURY: NO
TOILETING_ISSUES: NO
TRANSFERRING: 0-->INDEPENDENT
WALKING_OR_CLIMBING_STAIRS_DIFFICULTY: NO
DIFFICULTY_EATING/SWALLOWING: NO
DRESSING/BATHING_DIFFICULTY: NO
ADLS_ACUITY_SCORE: 35
ADLS_ACUITY_SCORE: 33
ADLS_ACUITY_SCORE: 37
ADLS_ACUITY_SCORE: 35
FALL_HISTORY_WITHIN_LAST_SIX_MONTHS: NO
ADLS_ACUITY_SCORE: 18
CONCENTRATING,_REMEMBERING_OR_MAKING_DECISIONS_DIFFICULTY: NO
ADLS_ACUITY_SCORE: 35
TRANSFERRING: 0-->INDEPENDENT
WALKING_OR_CLIMBING_STAIRS: OTHER (SEE COMMENTS)
WEAR_GLASSES_OR_BLIND: NO
ADLS_ACUITY_SCORE: 35
ADLS_ACUITY_SCORE: 35
DOING_ERRANDS_INDEPENDENTLY_DIFFICULTY: NO

## 2023-01-08 NOTE — CONSULTS
Hematology Consult Note   Date of Service: 01/08/2023    Patient: Jennifer Cervantes  MRN: [id number]  Admission Date: 1/8/2023  Hospital Day # Hospital Day: 1  Primary Outpatient Hematologist: Dr. 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  Given leukocytosis, hypoxia and CXR findings of perihilar and bibasilar opacities she was started on antibiotics and supplemental o2.  When seen later in the morning she was off oxygen with adequate oxygenation and felt pain was preventing a deep breath and pain is c/w sickle cell pain.  Given her significant clotting history we recommend work-up for PE and close monitoring of oxygenation status.  At this time there is no indication for exchange or simple transfusion.     Recommendations:     General Sickle Cell Management   - Monitor CBC w/ diff, retic count, bilirubin daily  - Please discuss transfusions with hematology first   -No transfusion indicated today but will need close monitoring of oxygen status  - Continue hydroxyurea 3000 mg daily   - Continue folic acid    Sickle cell pain  Right chest wall pain  Hypoxia  -PE work-up  -Agree with antibiotics per primary team   - Continue aggressive pain control per primary team and care plan   -Per plan avoid PCA in the hospital, will follow outpatient infusion center plan  (previously morphine, recently wanted to change to dilaudid)  - Consider scheduled APAP, NSAIDs (ketorolac or ibuprofen), gabapentin,   - Consider topicals (lidocaine patch, diclofenac gel, BenGay, warm packs) if patient finds these helpful    Acute chest prevention:  - CXR at admission showed perihilar and bibasilar opacities, likely atelectasis  - Monitor O2 sats and vitals  - Encourage incentive spirometry q1h WA  - Encourage ambulation, consider PT consult  - Monitor for fluid overload and discontinue fluids if taking adequate po  - Please page hematology if patient develops fever, acute shortness of breath or other symptoms of acute chest syndrome.     Recurrent VTE/PE   Hx of stroke  -On Asa BID since 2/2022 due to failure of xarelto, apixaban, dabigatran, warfarin previously   -Continue asa BID    Follow up  - Will continue to follow while inpatient.  - Next outpatient follow up to be arranged.      Patient was seen and plan of care was discussed with attending physician Dr. Alvarado    We will continue to follow this patient. Please don't hesitate to contact the Fellow On-Call with questions.    I spent 25 minutes face-to-face or coordinating care of Jennifer Cervantes.  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       She  reports that later yesterday she developed sudden onset of sharp pain in her right lateral chest.  It was not associated with any shortness of breath, fever, cough. She tried her oxycodone q 4 hours and this did not relieve the pain so she came into the ED early this morning.  She was afebrile, normotensive with o2 sat 90% then 87% on RA.  She was placed on NC 3 LPM and   Here labs showed leukocytosis with WBC 20.4, ANC 17.9, hgb 7.4, plt 411, retics elevated 0.365 with normal renal function and liver tests aside from bilirubin elevated at 3.     She was placed on o2 in the ED for o2 sat of 87% this am but states she never felt short of breath but is not able to take a deep breath because of the pain.  When I am seeing her later in the morning she is off oxygen and has o2 saturation of 97-99% on RA.      She states she does not have any other sickle cell pain besides the right chest.   She denies any fever, chills, shortness of breath, cough, sore throat, n/v/d.   She has been adherent to her hydrea and aspirin.       Hematologic History:  -Last seen in clinic by Dr. Duncan on 1/2/23 where her twice weekly infusions were changed from morphine to dilaudid for opioids rotation     Patient background: 21 yo F, enjoys movies and kids though there are times where she does not really want to talk to people. Does not have a lot of social support at home.      Sickle Cell Disease History  Primary Hematologist Team: Jose Rafael Duncan  PCP: none  Genotype: SS  Acute Pain Crisis Treatment: (avoiding IV opioids for now, which she has agreed to)    ER   ? Oxycodone 15-20 mg x1  ? Ketamine 4mg IV x 2--helps with opioid sparing  ? Toradol 15 mg IV x1   ? Maintenance IV fluids with LR  ? Other: Zofran 8 mg IV PRN nausea    Inpatient:  ? Home oxycodone/Oxycontin regimen, though home oxycodone dosing could be increased to 20 mg to start  ? PCA plan:   - None for now  ? Other Medications: Zofran  ? She has had success with ketamine  starting at 4mg/h and advancing only to 6mg/h, as 8mg/h made her feel quite poorly..  ? ASA  ? Supportive Care: Docusate, Senna  Chronic Pain Medications:                          Home regimen oxycodone 15 mg p.o. q.4 hours p.r.n. breakthrough pain.  She also continues on Voltaren gel, and Zoloft among other medications.                          -Also benefits from mental health visits, acupuncture  Baseline Hemoglobin: 7 g/dl without chronic transfusions  Hydroxyurea use: Yes  H/O blood transfusions: Yes, several (iron overload) Most recent 11/20/2021    H/O Transfusion Reactions: no    Antibodies:none  H/O Acute Chest Syndrome: Yes    Last episode:9/05/22 (previously 4/26/21, 10/2019)            ICU/intubation: not with 9/2022 admission  H/O Stroke: Yes (managed with chronic transfusions in the past, stopped late Spring 2020)  H/O VTE: Yes (2/2021)  H/O Cholecystectomy or Splenectomy: Cholecystectomy  H/O Asthma, Pulm HTN, AVN, Leg Ulcers, Nephropathy, Retinopathy, etc: Iron overload, asthma, chronic lung disease, physical limitations from early stroke    Clotting history:   #2/1/2021: NM scan showed At least 3 moderate subsegmental perfusion defects (right greater than left), these findings are consistent with pulmonary emboli   -Started on xarelto   #3/25/2021: Dx with RUE DVT   -Xarelto changed to apixaban   #4/26/2021: She had worsening PE in setting of low apixaban level (despite compliance) and she was switched back to rivaroxaban starting with loading dose x 21 days.    #7/13-7/25/2021: Admitted for acute on chronic PE. Anticoagulation changed to dabigatran + aspirin   #11/15/2021: NM scan showed new right lower lateral segment perfusion defect.  Multiple bilateral chronic perfusion defects.   -Anticoagulation changed to warfarin but she was almost always subtherapeutic despite being compliant   2/22/2022: warfarin stopped due to being subtherapeutic, even with lovenox bridging   -Dr. Duncan stopped  warfarin and lovenox and changed her to aspirin BID (From daily)    Review of Systems: Pertinent positive and negative systems described in HPI; the remainder of the 14 systems are negative    Past Medical History:  Past Medical History:   Diagnosis Date     Anxiety      Bleeding disorder (H)      Blood clotting disorder (H)      Cerebral infarction (H) 2015     Cognitive developmental delay     low IQ. Please recognize when managing pain and planning with her     Depressive disorder      Hemiplegia and hemiparesis following cerebral infarction affecting right dominant side (H)     right hand contractures     Iron overload due to repeated red blood cell transfusions      Migraines      Multiple subsegmental pulmonary emboli without acute cor pulmonale (H) 02/01/2021     Oppositional defiant behavior      Presence of intrauterine contraceptive device 2/18/2020     Superficial venous thrombosis of arm, right 03/25/2021     Uncomplicated asthma        Past Surgical History:  Past Surgical History:   Procedure Laterality Date     AS INSERT TUNNELED CV 2 CATH W/O PORT/PUMP       CHOLECYSTECTOMY       CV RIGHT HEART CATH MEASUREMENTS RECORDED N/A 11/18/2021    Procedure: Right Heart Cath;  Surgeon: Jackson Stauffer MD;  Location:  HEART CARDIAC CATH LAB     INSERT PORT VASCULAR ACCESS Left 4/21/2021    Procedure: INSERTION, VASCULAR ACCESS PORT (NOT SURE ON SIDE UNTIL REMOVAL);  Surgeon: Rajan More MD;  Location: [facility] OR     IR CHEST PORT PLACEMENT > 5 YRS OF AGE  4/21/2021     IR CVC NON TUNNEL LINE REMOVAL  5/6/2021     IR CVC NON TUNNEL PLACEMENT > 5 YRS  04/07/2020     IR CVC NON TUNNEL PLACEMENT > 5 YRS  4/30/2021     IR CVC NON TUNNEL PLACEMENT > 5 YRS  9/7/2022     IR PORT REMOVAL LEFT  4/21/2021     REMOVE PORT VASCULAR ACCESS Left 4/21/2021    Procedure: REMOVAL, VASCULAR ACCESS PORT LEFT;  Surgeon: Rajan More MD;  Location: [facility] OR     REPAIR TENDON ELBOW Right 10/02/2019    Procedure: Right Elbow  "Flexor Lengthening, Flexor Pronator Slide Of Wrist and Finger, Thumb Adductor Lengthening;  Surgeon: Anai Franco MD;  Location: [facility]     TONSILLECTOMY Bilateral 10/02/2019    Procedure: Bilateral Tonsillectomy;  Surgeon: Farhana Guy MD;  Location: [facility]     ZZC BREAST REDUCTION (INCLUDES LIPO) TIER 3 Bilateral 04/23/2019       Social History:  Social History     Socioeconomic History     Marital status: Single     Spouse name: None     Number of children: None     Years of education: None     Highest education level: None   Tobacco Use     Smoking status: Never     Smokeless tobacco: Never   Substance and Sexual Activity     Alcohol use: Not Currently     Alcohol/week: 0.0 standard drinks     Drug use: Never     Sexual activity: Not Currently     Partners: Male     Birth control/protection: Other   Social History Narrative    Lives with mom and extended family (mom is her PCA and ILS worker) but \"toxic environment\" per her report. As of 9/28/2022 she is planning to move out at some point soon. She has minimal support at home despite her significant SCD comorbidities and cognitive delay from stroke.     Social Determinants of Health     Intimate Partner Violence: Not At Risk     Fear of Current or Ex-Partner: No     Emotionally Abused: No     Physically Abused: No     Sexually Abused: No        Family History  Family History   Problem Relation Age of Onset     Sickle Cell Trait Mother      Hypertension Mother      Asthma Mother      Sickle Cell Trait Father      Glaucoma No family hx of      Macular Degeneration No family hx of      Diabetes No family hx of      Gout No family hx of        Outpatient Medications:  medroxyPROGESTERone (DEPO-PROVERA) injection 150 mg    acetaminophen (TYLENOL) 325 MG tablet, Take 2 tablets (650 mg) by mouth every 6 hours as needed for mild pain  albuterol (PROVENTIL) (2.5 MG/3ML) 0.083% neb solution, Take 2 vials (5 mg) by nebulization every 6 hours as " "needed for shortness of breath / dyspnea or wheezing  aspirin (ASA) 81 MG chewable tablet, Take 1 tablet (81 mg) by mouth 2 times daily  budesonide-formoterol (SYMBICORT) 160-4.5 MCG/ACT Inhaler, Inhale 2 puffs into the lungs 2 times daily  deferasirox (JADENU) 360 MG tablet, Take 4 tablets (1,440 mg) by mouth every evening  EPINEPHrine (ANY BX GENERIC EQUIV) 0.3 MG/0.3ML injection 2-pack, Inject 0.3 mLs (0.3 mg) into the muscle as needed for anaphylaxis (Patient not taking: Reported on 12/26/2022)  FLUoxetine (PROZAC) 10 MG capsule, Take 1 capsule (10 mg) by mouth daily For two weeks, then increase to 20 mg if 10 mg not effective  Hydroxyurea 1000 MG TABS, Take 3,000 mg by mouth daily  ondansetron (ZOFRAN) 8 MG tablet, Take 1 tablet (8 mg) by mouth every 8 hours as needed  oxyCODONE IR (ROXICODONE) 15 MG tablet, Take 1 tablet (15 mg) by mouth every 4 hours as needed for severe pain (7-10) Goal 4 per day. Max 6 per day.  traZODone (DESYREL) 50 MG tablet, Take 1 tablet (50 mg) by mouth At Bedtime         Physical Exam:    /70   Pulse 95   Temp 97.8  F (36.6  C) (Oral)   Resp 18   Ht 1.626 m (5' 4\")   Wt 77.1 kg (170 lb)   SpO2 95%   BMI 29.18 kg/m    Gen: Appears uncomfortable, non toxic   HEENT: EOMI, PERRL, mmm, oropharynx clear  CV: Normal rate, regular rhythm. No m/r/g  Pulm: CTAB, no wheezing, normal work of breathing.  On RA   Abd: Soft, nt/nd, no rebound/guarding  Ext: Warm and well perfused. No lower extremity edema  Skin: No rash, cyanosis or petechial lesion  Neuro: Alert and answering questions appropriately.     Labs & Studies: I personally reviewed the following studies:  ROUTINE LABS (Last four results):  CMP  Recent Labs   Lab 01/08/23  0952 01/02/23  0816   * 137   POTASSIUM 3.5 3.6   CHLORIDE 103 107   CO2 16* 20*   ANIONGAP 13 10   GLC 94 109*   BUN 12.2 3.8*   CR 0.59 0.48*   GFRESTIMATED >90 >90   MICAH 8.5* 8.7   PROTTOTAL 6.9 7.0   ALBUMIN 4.0 4.2   BILITOTAL 3.0* 4.3*   ALKPHOS " 80 77   AST 48* 42*   ALT 27 21     CBC  Recent Labs   Lab 01/08/23  0947 01/02/23  0816   WBC 20.4* 12.0*   RBC 2.31* 2.25*   HGB 7.4* 7.0*   HCT 20.4* 19.7*   MCV 88 88   MCH 32.0 31.1   MCHC 36.3 35.5   RDW 25.2* 26.1*    460*     INRNo lab results found in last 7 days.

## 2023-01-08 NOTE — ED TRIAGE NOTES
Coming in with sickle cell pain. States it's on right side and started last night around 7 pm. Has taken home medication without relief.     Triage Assessment     Row Name 01/08/23 0745       Triage Assessment (Adult)    Airway WDL WDL       Respiratory WDL    Respiratory WDL WDL       Skin Circulation/Temperature WDL    Skin Circulation/Temperature WDL WDL

## 2023-01-08 NOTE — ED PROVIDER NOTES
[place] EMERGENCY DEPARTMENT ([facility])  January 8, 2023     History     Chief Complaint   Patient presents with     Sickle Cell Pain Crisis     Right side     HPI  Jennifer Cervantes is a 22 year old female with a past medical history including Hb-SS disease, sickle cell pain crisis, chronic PE without acute cor pulmonale, acute chest syndrome, hemiplegia and hemiparesis following cerebral infarction affecting right dominant side who presents to the Emergency Department for evaluation of right sided chest wall pain. Patient reports onset of pain in her right-sided lower chest last night at about 9 PM while she was resting. She states that the pain feels like her typical sickle cell pain crisis. Patient denies shortness of breath or fever. No blood in urine, burning pain with urination, or any other urinary symptoms. No bowel symptoms.  She reports she does have pain crises associated with cold weather.  Denies any recent exertions.      Past Medical History  Past Medical History:   Diagnosis Date     Anxiety      Bleeding disorder (H)      Blood clotting disorder (H)      Cerebral infarction (H) 2015     Cognitive developmental delay     low IQ. Please recognize when managing pain and planning with her     Depressive disorder      Hemiplegia and hemiparesis following cerebral infarction affecting right dominant side (H)     right hand contractures     Iron overload due to repeated red blood cell transfusions      Migraines      Multiple subsegmental pulmonary emboli without acute cor pulmonale (H) 02/01/2021     Oppositional defiant behavior      Presence of intrauterine contraceptive device 2/18/2020     Superficial venous thrombosis of arm, right 03/25/2021     Uncomplicated asthma      Past Surgical History:   Procedure Laterality Date     AS INSERT TUNNELED CV 2 CATH W/O PORT/PUMP       CHOLECYSTECTOMY       CV RIGHT HEART CATH MEASUREMENTS RECORDED N/A 11/18/2021    Procedure: Right Heart Cath;   Surgeon: Jackson Stauffer MD;  Location:  HEART CARDIAC CATH LAB     INSERT PORT VASCULAR ACCESS Left 4/21/2021    Procedure: INSERTION, VASCULAR ACCESS PORT (NOT SURE ON SIDE UNTIL REMOVAL);  Surgeon: Rajan More MD;  Location: [facility] OR     IR CHEST PORT PLACEMENT > 5 YRS OF AGE  4/21/2021     IR CVC NON TUNNEL LINE REMOVAL  5/6/2021     IR CVC NON TUNNEL PLACEMENT > 5 YRS  04/07/2020     IR CVC NON TUNNEL PLACEMENT > 5 YRS  4/30/2021     IR CVC NON TUNNEL PLACEMENT > 5 YRS  9/7/2022     IR PORT REMOVAL LEFT  4/21/2021     REMOVE PORT VASCULAR ACCESS Left 4/21/2021    Procedure: REMOVAL, VASCULAR ACCESS PORT LEFT;  Surgeon: Rajan More MD;  Location: [facility] OR     REPAIR TENDON ELBOW Right 10/02/2019    Procedure: Right Elbow Flexor Lengthening, Flexor Pronator Slide Of Wrist and Finger, Thumb Adductor Lengthening;  Surgeon: Anai Franco MD;  [facility]     TONSILLECTOMY Bilateral 10/02/2019    Procedure: Bilateral Tonsillectomy;  Surgeon: Farhana Guy MD;  Location: [facility]     ZZC BREAST REDUCTION (INCLUDES LIPO) TIER 3 Bilateral 04/23/2019     acetaminophen (TYLENOL) 325 MG tablet  albuterol (PROVENTIL) (2.5 MG/3ML) 0.083% neb solution  aspirin (ASA) 81 MG chewable tablet  budesonide-formoterol (SYMBICORT) 160-4.5 MCG/ACT Inhaler  deferasirox (JADENU) 360 MG tablet  EPINEPHrine (ANY BX GENERIC EQUIV) 0.3 MG/0.3ML injection 2-pack  FLUoxetine (PROZAC) 10 MG capsule  Hydroxyurea 1000 MG TABS  ondansetron (ZOFRAN) 8 MG tablet  oxyCODONE IR (ROXICODONE) 15 MG tablet  traZODone (DESYREL) 50 MG tablet      Allergies   Allergen Reactions     Contrast Dye      Hives and breathing issues     Fish-Derived Products Hives     Seafood Hives     Diagnostic X-Ray Materials      Gadolinium      Family History  Family History   Problem Relation Age of Onset     Sickle Cell Trait Mother      Hypertension Mother      Asthma Mother      Sickle Cell Trait Father      Glaucoma No family hx of       "Macular Degeneration No family hx of      Diabetes No family hx of      Gout No family hx of      Social History   Social History     Tobacco Use     Smoking status: Never     Smokeless tobacco: Never   Substance Use Topics     Alcohol use: Not Currently     Alcohol/week: 0.0 standard drinks     Drug use: Never      Past medical history, past surgical history, medications, allergies, family history, and social history were reviewed with the patient. No additional pertinent items.      A complete review of systems was performed with pertinent positives and negatives noted in the HPI, and all other systems negative.    Physical Exam   BP: 133/81  Pulse: 118  Temp: 97.8  F (36.6  C)  Resp: 18  Height: 162.6 cm (5' 4\")  Weight: 77.1 kg (170 lb)  SpO2: 90 %  Physical Exam  Abdominal:           General: patient is alert and oriented, quite uncomfortable appearing   Head: atraumatic and normocephalic   EENT: moist mucus membranes, sclera anicteric    Neck: supple without meningismus  Cardiovascular: Tachycardic, no murmur appreciated, extremities warm and well perfused, no lower extremity edema  Pulmonary: lungs clear to auscultation bilaterally   Abdomen: soft, non-tender   Musculoskeletal: normal range of motion   Neurological: alert and oriented, moving all extremities symmetrically, gait normal   Skin: warm, dry     ED Course, Procedures, & Data     8:02 AM  The patient was seen and examined by Melissa Larson MD in triage.    Procedures              EKG Interpretation:      Interpreted by Melissa Larson MD  Time reviewed:1136  Symptoms at time of EKG: dyspnea   Rhythm: Normal sinus   Rate: Normal  Axis: Normal  Ectopy: None  Conduction: Normal  ST Segments/ T Waves: No acute ischemic changes and QTc prolongation 474  Q Waves: None  Comparison to prior: Unchanged    Clinical Impression: prolonged QT interval         No results found for any visits on 01/08/23.  Medications - No data to display  Labs Ordered and " Resulted from Time of ED Arrival to Time of ED Departure - No data to display  No orders to display          Medical Decision Making  The patient presented with a problem that is a chronic illness severe exacerbation, progression, or side effect of treatment.    The patient's evaluation involved:  review of 3+ prior external note(s) (see separate area of note for details)  ordering and review of 3+ test(s) (see separate area of note for details)  review of 3+ test result(s) ordered prior to this encounter (see separate area of note for details)    The patient's management involved a parenteral controlled substance and a decision regarding hospitalization.      Assessment & Plan    Ms. Cervantes is a 22 year old female with a past medical history including Hb-SS disease, sickle cell pain crisis, chronic PE without acute cor pulmonale, acute chest syndrome, hemiplegia and hemiparesis following cerebral infarction affecting right dominant side who presents to the Emergency Department for evaluation of right sided chest wall pain.  She is noted to be Tachycardic, normotensive.  SPO2 initially 90% however on recheck is down to 87%.  She is afebrile.  She was given IV fluids, Toradol and ketamine per her care plan.  Differential diagnosis includes but is not limited to pain crises, acute chest syndrome, PE, pneumonia, UTI.  Laboratory evaluation demonstrates a leukocytosis to 20.4, hemoglobin 7.4, sodium of 132, AST 48 and total bilirubin of 3.  Her D-dimer is elevated at 3.14.  Troponin is less than 6.  Chest x-ray shows perihilar and bibasilar opacities.  Given her hypoxia, leukocytosis and infiltrates concern for possible acute chest syndrome.  She was given ceftriaxone and doxycycline.  Additionally recurrent PE is a consideration and VQ scan was ordered.  We will plan to admit to medicine for continued antibiotics, respiratory support and pain management.    I have reviewed the nursing notes. I have reviewed the findings,  diagnosis, plan and need for follow up with the patient.    New Prescriptions    No medications on file       Final diagnoses:   Acute respiratory failure with hypoxia (H)   Sickle cell pain crisis (H)   Acute chest syndrome (H)     IKiarra, am serving as a trained medical scribe to document services personally performed by Melissa Larson MD, based on the provider's statements to me.   IMelissa MD, was physically present and have reviewed and verified the accuracy of this note documented by Kiarra Valdez.    Melissa Larson MD  [facility] EMERGENCY DEPARTMENT  1/8/2023     Melissa Larson MD  01/08/23 1141

## 2023-01-08 NOTE — H&P
[facility]    History and Physical - Hospitalist Service, GOLD TEAM 13       Date of Admission:  1/8/2023    Assessment & Plan      Jennifer Cervantes is a 22 year old female with a past medical history including Hb-SS disease, sickle cell pain crises, chronic PE without acute cor pulmonale, acute chest syndrome, and right sided hemiplegia and hemiparesis following remote cerebral infarction now admitted for management of acute chest wall pain and possible acute chest syndrome.     #Sickle Cell Pain Crisis  #Acute chest: Mild   #Hx Sickle Cell (SS) with hx CVA, acute chest pulm htn, chronic PE  Presents with acute onset chest wall pain. Home pain management with oxycodone 15mg q4h PRN, has been taking scheduled recently due to pain being worse in winter.  Unable to have acute pain infusion appt Friday 1/6.  Care plan in place followed in ED, will initiate inpatient care per pain plan.  Discussed with hematology, severe pain when evaluated, reasonable to give IV pain control x1.  Regarding acute chest, hypoxic with white count and streaky atelectasis in ED, but breathing comfortably with non-focal exam, no tachypnea or increased work of breathing, denies respiratory symptoms on my exam; will plan to treat for the time being and re-evaluated 1/9.  Given hypoxia and elevated D dimer, would also consider PE, has contrast allergy   - Per infusion appointments: 500mL LR (already received 500mL in ED) + 2mg morphone x3  - Pain mgmt per care plan:    ketamine gtt starting at 4mg/hr, may increase to 6mg/hr (previously felt bad at 8mg/hr)    Opiates:Oxycodone 15mg q4h PRN   Voltaren gel PRN   Continue PTA ASA   - Ketorolac 30mg q6h x24h  - Senna/docusate while on opiates  - Acute chest: continue ceftriaxone/doxycycline   - VQ scan pending   - Continue PTA hydroxyuria  - Hold deferasirox pending heme    #Hyponatremia: Poor PO intake, recheck s/p fluid resuscitation   #Hx of CVA:  "continue PTA ASA  #Hx asthma: cont PTA budesonide/formoterol, PRN albuterol  #Depression: cont PTA prozac, health psych consulted.        Diet:   Full diet  DVT Prophylaxis: Anti-embolisim stockings (TEDs)  Lopez Catheter: Not present  Lines: PRESENT             Cardiac Monitoring: None  Code Status:   Full    Clinically Significant Risk Factors Present on Admission                       # Overweight: Estimated body mass index is 29.18 kg/m  as calculated from the following:    Height as of this encounter: 1.626 m (5' 4\").    Weight as of this encounter: 77.1 kg (170 lb).           Disposition Plan      Expected Discharge Date: 01/10/2023                  Jamaal Garcia MD  Hospitalist Service, [facility] TEAM [address] [place] [address]  Securely message with Vocera (more info)  Text page via [employer] Paging/Directory   See signed in provider for up to date coverage information    ______________________________________________________________________    Chief Complaint   Sickle Cell Pain    History is obtained from the patient and electronic health record    History of Present Illness     Jennifer Cervantes is a 22 year old female with a past medical history including Hb-SS disease, sickle cell pain crisis, chronic PE without acute cor pulmonale, acute chest syndrome, right sided hemiplegia and hemiparesis following remote cerebral infarction who presented to the with chest wall pain for the last day.  She reported that right-sided lower chest started last night at around 9 PM while she was resting.  Unable ot manage pain at home with heat/cold packs and PO oxycodone, was not able to increase frequency of dosing as she was already taking 15mg q4h, which his her max dose.  Pain feels similar to previous pain crises. Patient denies dyspnea or fever, no wheezing, no inhaler use. Denies dysuria, hematuria or any other urinary symptoms. Normal bowel movements, denies constipation. She reports she " does have pain crises associated with cold weather.  She has 2x/week PRN outpatient infusions with morphine/IVFs, last on 1/2, attempted to schedule appointment on Friday, 1/6 but was unable to get in, instead scheduled for 1/9. L  last exchange transfusion during her last hospitalization in September 2022.     In the ED, she was managed per care coordination note: fluid bolus 500mL, ketorolac 15mg, ketamine 4mg x2, and oxycodone 20mg.  She was found to be hypoxic to 87% and placed on 3L NC with improvement to 90s.  Labs notable for WBC 20 (ANC 17), Hgb 7.3 (BL ~7-8), bili 3.0, CO2 16; CXR showed streaky opacity.  Given hypoxia, CXR, and leukocytosis, she was started on CTX/doxy for acute chest syndrome, and admitted to medicine for further evaluation and management.     Past Medical History    Past Medical History:   Diagnosis Date     Anxiety      Bleeding disorder (H)      Blood clotting disorder (H)      Cerebral infarction (H) 2015     Cognitive developmental delay     low IQ. Please recognize when managing pain and planning with her     Depressive disorder      Hemiplegia and hemiparesis following cerebral infarction affecting right dominant side (H)     right hand contractures     Iron overload due to repeated red blood cell transfusions      Migraines      Multiple subsegmental pulmonary emboli without acute cor pulmonale (H) 02/01/2021     Oppositional defiant behavior      Presence of intrauterine contraceptive device 2/18/2020     Superficial venous thrombosis of arm, right 03/25/2021     Uncomplicated asthma        Past Surgical History   Past Surgical History:   Procedure Laterality Date     AS INSERT TUNNELED CV 2 CATH W/O PORT/PUMP       CHOLECYSTECTOMY       CV RIGHT HEART CATH MEASUREMENTS RECORDED N/A 11/18/2021    Procedure: Right Heart Cath;  Surgeon: Jackson Stauffer MD;  Location:  HEART CARDIAC CATH LAB     INSERT PORT VASCULAR ACCESS Left 4/21/2021    Procedure: INSERTION, VASCULAR  ACCESS PORT (NOT SURE ON SIDE UNTIL REMOVAL);  Surgeon: Rajan More MD;  Location: [facility] OR     IR CHEST PORT PLACEMENT > 5 YRS OF AGE  4/21/2021     IR CVC NON TUNNEL LINE REMOVAL  5/6/2021     IR CVC NON TUNNEL PLACEMENT > 5 YRS  04/07/2020     IR CVC NON TUNNEL PLACEMENT > 5 YRS  4/30/2021     IR CVC NON TUNNEL PLACEMENT > 5 YRS  9/7/2022     IR PORT REMOVAL LEFT  4/21/2021     REMOVE PORT VASCULAR ACCESS Left 4/21/2021    Procedure: REMOVAL, VASCULAR ACCESS PORT LEFT;  Surgeon: Rajan More MD;  Location: [facility] OR     REPAIR TENDON ELBOW Right 10/02/2019    Procedure: Right Elbow Flexor Lengthening, Flexor Pronator Slide Of Wrist and Finger, Thumb Adductor Lengthening;  Surgeon: Anai Franco MD;  Location: [facility] OR     TONSILLECTOMY Bilateral 10/02/2019    Procedure: Bilateral Tonsillectomy;  Surgeon: Farhana Guy MD;  Location: [facility] OR     ZZC BREAST REDUCTION (INCLUDES LIPO) TIER 3 Bilateral 04/23/2019       Prior to Admission Medications   Prior to Admission Medications   Prescriptions Last Dose Informant Patient Reported? Taking?   EPINEPHrine (ANY BX GENERIC EQUIV) 0.3 MG/0.3ML injection 2-pack  Self No No   Sig: Inject 0.3 mLs (0.3 mg) into the muscle as needed for anaphylaxis   Patient not taking: Reported on 12/26/2022   FLUoxetine (PROZAC) 10 MG capsule   No No   Sig: Take 1 capsule (10 mg) by mouth daily For two weeks, then increase to 20 mg if 10 mg not effective   Hydroxyurea 1000 MG TABS   No No   Sig: Take 3,000 mg by mouth daily   acetaminophen (TYLENOL) 325 MG tablet   No No   Sig: Take 2 tablets (650 mg) by mouth every 6 hours as needed for mild pain   albuterol (PROVENTIL) (2.5 MG/3ML) 0.083% neb solution  Self No No   Sig: Take 2 vials (5 mg) by nebulization every 6 hours as needed for shortness of breath / dyspnea or wheezing   aspirin (ASA) 81 MG chewable tablet   No No   Sig: Take 1 tablet (81 mg) by mouth 2 times daily   budesonide-formoterol (SYMBICORT) 160-4.5  MCG/ACT Inhaler  Self No No   Sig: Inhale 2 puffs into the lungs 2 times daily   deferasirox (JADENU) 360 MG tablet  Self No No   Sig: Take 4 tablets (1,440 mg) by mouth every evening   ondansetron (ZOFRAN) 8 MG tablet   No No   Sig: Take 1 tablet (8 mg) by mouth every 8 hours as needed   oxyCODONE IR (ROXICODONE) 15 MG tablet   No No   Sig: Take 1 tablet (15 mg) by mouth every 4 hours as needed for severe pain (7-10) Goal 4 per day. Max 6 per day.   traZODone (DESYREL) 50 MG tablet   No No   Sig: Take 1 tablet (50 mg) by mouth At Bedtime      Facility-Administered Medications Last Administration Doses Remaining   medroxyPROGESTERone (DEPO-PROVERA) injection 150 mg 12/30/2022  1:48 PM 2           Review of Systems    The 10 point Review of Systems is negative other than noted in the HPI or here.      Physical Exam   Vital Signs: Temp: 97.8  F (36.6  C) Temp src: Oral BP: 117/70 Pulse: 95   Resp: 18 SpO2: 100 % O2 Device: Nasal cannula Oxygen Delivery: 3 LPM  Weight: 170 lbs 0 oz    GA: Crunched up uncomfortably in bed  HEENT: EOMI, anicteric sclerae.  MMM, no oral lesions  CV: Tachycardic with regular rhythm, soft systolic murmur along left parasternal   Pulm: CTAB, no wheezing, no prolonged expiratory phase, no crackles  Abd: Soft, NT/ND, no organomegaly   Skin: no new rash, no bruising.     Medical Decision Making       80 MINUTES SPENT BY ME on the date of service doing chart review, history, exam, documentation & further activities per the note.      Data     I have personally reviewed the following data over the past 24 hrs:    20.4 (H)  \   7.4 (L)   / 411     132 (L) 103 12.2 /  94   3.5 16 (L) 0.59 \       ALT: 27 AST: 48 (H) AP: 80 TBILI: 3.0 (H)   ALB: 4.0 TOT PROTEIN: 6.9 LIPASE: N/A       Trop: <6 BNP: N/A       INR:  N/A PTT:  N/A   D-dimer:  3.14 (H) Fibrinogen:  N/A       Ferritin:  N/A % Retic:  15.8 (H) LDH:  N/A       Imaging results reviewed over the past 24 hrs:   Recent Results (from the past 24  hour(s))   XR Chest 2 Views    Impression    RESIDENT PRELIMINARY INTERPRETATION  IMPRESSION:  Decreased relative lung volumes with perihilar and bibasilar  opacities, likely atelectasis.

## 2023-01-09 LAB
ALBUMIN SERPL BCG-MCNC: 3.6 G/DL (ref 3.5–5.2)
ALBUMIN UR-MCNC: NEGATIVE MG/DL
ALP SERPL-CCNC: 75 U/L (ref 35–104)
ALT SERPL W P-5'-P-CCNC: 14 U/L (ref 10–35)
ANION GAP SERPL CALCULATED.3IONS-SCNC: 14 MMOL/L (ref 7–15)
APPEARANCE UR: CLEAR
AST SERPL W P-5'-P-CCNC: 43 U/L (ref 10–35)
ATRIAL RATE - MUSE: 89 BPM
BACTERIA #/AREA URNS HPF: ABNORMAL /HPF
BILIRUB SERPL-MCNC: 2.4 MG/DL
BILIRUB UR QL STRIP: NEGATIVE
BUN SERPL-MCNC: 7.5 MG/DL (ref 6–20)
CALCIUM SERPL-MCNC: 8.4 MG/DL (ref 8.6–10)
CHLORIDE SERPL-SCNC: 107 MMOL/L (ref 98–107)
COLOR UR AUTO: YELLOW
CREAT SERPL-MCNC: 0.54 MG/DL (ref 0.51–0.95)
DEPRECATED HCO3 PLAS-SCNC: 18 MMOL/L (ref 22–29)
DIASTOLIC BLOOD PRESSURE - MUSE: NORMAL MMHG
ERYTHROCYTE [DISTWIDTH] IN BLOOD BY AUTOMATED COUNT: 25.3 % (ref 10–15)
GFR SERPL CREATININE-BSD FRML MDRD: >90 ML/MIN/1.73M2
GLUCOSE SERPL-MCNC: 95 MG/DL (ref 70–99)
GLUCOSE UR STRIP-MCNC: NEGATIVE MG/DL
HCT VFR BLD AUTO: 19.6 % (ref 35–47)
HGB BLD-MCNC: 6.7 G/DL (ref 11.7–15.7)
HGB UR QL STRIP: NEGATIVE
INTERPRETATION ECG - MUSE: NORMAL
KETONES UR STRIP-MCNC: NEGATIVE MG/DL
LEUKOCYTE ESTERASE UR QL STRIP: NEGATIVE
MCH RBC QN AUTO: 31.5 PG (ref 26.5–33)
MCHC RBC AUTO-ENTMCNC: 34.2 G/DL (ref 31.5–36.5)
MCV RBC AUTO: 92 FL (ref 78–100)
NITRATE UR QL: NEGATIVE
P AXIS - MUSE: 69 DEGREES
PH UR STRIP: 5.5 [PH] (ref 5–7)
PLATELET # BLD AUTO: 359 10E3/UL (ref 150–450)
POTASSIUM SERPL-SCNC: 3.8 MMOL/L (ref 3.4–5.3)
PR INTERVAL - MUSE: 158 MS
PROT SERPL-MCNC: 6.3 G/DL (ref 6.4–8.3)
QRS DURATION - MUSE: 80 MS
QT - MUSE: 390 MS
QTC - MUSE: 474 MS
R AXIS - MUSE: 44 DEGREES
RBC # BLD AUTO: 2.13 10E6/UL (ref 3.8–5.2)
RBC URINE: 1 /HPF
RETICS # AUTO: 0.41 10E6/UL (ref 0.03–0.1)
RETICS/RBC NFR AUTO: 19.5 % (ref 0.5–2)
SODIUM SERPL-SCNC: 139 MMOL/L (ref 136–145)
SP GR UR STRIP: 1.01 (ref 1–1.03)
SQUAMOUS EPITHELIAL: 4 /HPF
SYSTOLIC BLOOD PRESSURE - MUSE: NORMAL MMHG
T AXIS - MUSE: 8 DEGREES
UROBILINOGEN UR STRIP-MCNC: NORMAL MG/DL
VENTRICULAR RATE- MUSE: 89 BPM
WBC # BLD AUTO: 12.6 10E3/UL (ref 4–11)
WBC URINE: 3 /HPF

## 2023-01-09 PROCEDURE — 120N000002 HC R&B MED SURG/OB UMMC

## 2023-01-09 PROCEDURE — 81001 URINALYSIS AUTO W/SCOPE: CPT | Performed by: STUDENT IN AN ORGANIZED HEALTH CARE EDUCATION/TRAINING PROGRAM

## 2023-01-09 PROCEDURE — 80053 COMPREHEN METABOLIC PANEL: CPT | Performed by: STUDENT IN AN ORGANIZED HEALTH CARE EDUCATION/TRAINING PROGRAM

## 2023-01-09 PROCEDURE — 250N000013 HC RX MED GY IP 250 OP 250 PS 637: Performed by: STUDENT IN AN ORGANIZED HEALTH CARE EDUCATION/TRAINING PROGRAM

## 2023-01-09 PROCEDURE — 99232 SBSQ HOSP IP/OBS MODERATE 35: CPT | Mod: GC | Performed by: INTERNAL MEDICINE

## 2023-01-09 PROCEDURE — 85027 COMPLETE CBC AUTOMATED: CPT | Performed by: PEDIATRICS

## 2023-01-09 PROCEDURE — 85045 AUTOMATED RETICULOCYTE COUNT: CPT | Performed by: PEDIATRICS

## 2023-01-09 PROCEDURE — 250N000009 HC RX 250: Performed by: STUDENT IN AN ORGANIZED HEALTH CARE EDUCATION/TRAINING PROGRAM

## 2023-01-09 PROCEDURE — 258N000003 HC RX IP 258 OP 636: Performed by: STUDENT IN AN ORGANIZED HEALTH CARE EDUCATION/TRAINING PROGRAM

## 2023-01-09 PROCEDURE — 99232 SBSQ HOSP IP/OBS MODERATE 35: CPT | Performed by: STUDENT IN AN ORGANIZED HEALTH CARE EDUCATION/TRAINING PROGRAM

## 2023-01-09 PROCEDURE — 36592 COLLECT BLOOD FROM PICC: CPT | Performed by: STUDENT IN AN ORGANIZED HEALTH CARE EDUCATION/TRAINING PROGRAM

## 2023-01-09 PROCEDURE — 250N000011 HC RX IP 250 OP 636: Performed by: STUDENT IN AN ORGANIZED HEALTH CARE EDUCATION/TRAINING PROGRAM

## 2023-01-09 RX ORDER — KETOROLAC TROMETHAMINE 30 MG/ML
30 INJECTION, SOLUTION INTRAMUSCULAR; INTRAVENOUS EVERY 6 HOURS
Status: COMPLETED | OUTPATIENT
Start: 2023-01-09 | End: 2023-01-10

## 2023-01-09 RX ADMIN — KETOROLAC TROMETHAMINE 30 MG: 30 INJECTION, SOLUTION INTRAMUSCULAR at 23:18

## 2023-01-09 RX ADMIN — KETOROLAC TROMETHAMINE 30 MG: 30 INJECTION, SOLUTION INTRAMUSCULAR; INTRAVENOUS at 03:00

## 2023-01-09 RX ADMIN — KETOROLAC TROMETHAMINE 30 MG: 30 INJECTION, SOLUTION INTRAMUSCULAR at 17:16

## 2023-01-09 RX ADMIN — CEFTRIAXONE SODIUM 1 G: 1 INJECTION, POWDER, FOR SOLUTION INTRAMUSCULAR; INTRAVENOUS at 12:37

## 2023-01-09 RX ADMIN — TRAZODONE HYDROCHLORIDE 50 MG: 50 TABLET ORAL at 21:18

## 2023-01-09 RX ADMIN — ASPIRIN 81 MG: 81 TABLET, CHEWABLE ORAL at 21:19

## 2023-01-09 RX ADMIN — FLUOXETINE 10 MG: 10 CAPSULE ORAL at 10:25

## 2023-01-09 RX ADMIN — KETAMINE HYDROCHLORIDE 4 MG/HR: 100 INJECTION, SOLUTION, CONCENTRATE INTRAMUSCULAR; INTRAVENOUS at 18:20

## 2023-01-09 RX ADMIN — ASPIRIN 81 MG: 81 TABLET, CHEWABLE ORAL at 10:25

## 2023-01-09 RX ADMIN — HYDROXYUREA 3000 MG: 500 CAPSULE ORAL at 10:25

## 2023-01-09 RX ADMIN — OXYCODONE HYDROCHLORIDE 15 MG: 10 TABLET ORAL at 21:18

## 2023-01-09 RX ADMIN — SODIUM CHLORIDE, POTASSIUM CHLORIDE, SODIUM LACTATE AND CALCIUM CHLORIDE: 600; 310; 30; 20 INJECTION, SOLUTION INTRAVENOUS at 10:29

## 2023-01-09 RX ADMIN — SENNOSIDES AND DOCUSATE SODIUM 1 TABLET: 50; 8.6 TABLET ORAL at 21:18

## 2023-01-09 RX ADMIN — DOXYCYCLINE HYCLATE 100 MG: 100 CAPSULE ORAL at 21:19

## 2023-01-09 RX ADMIN — MORPHINE SULFATE 2 MG: 2 INJECTION, SOLUTION INTRAMUSCULAR; INTRAVENOUS at 06:04

## 2023-01-09 RX ADMIN — DOXYCYCLINE HYCLATE 100 MG: 100 CAPSULE ORAL at 10:25

## 2023-01-09 RX ADMIN — MORPHINE SULFATE 2 MG: 2 INJECTION, SOLUTION INTRAMUSCULAR; INTRAVENOUS at 00:57

## 2023-01-09 RX ADMIN — OXYCODONE HYDROCHLORIDE 15 MG: 10 TABLET ORAL at 12:47

## 2023-01-09 RX ADMIN — KETOROLAC TROMETHAMINE 30 MG: 30 INJECTION, SOLUTION INTRAMUSCULAR; INTRAVENOUS at 10:24

## 2023-01-09 ASSESSMENT — ACTIVITIES OF DAILY LIVING (ADL)
ADLS_ACUITY_SCORE: 18
ADLS_ACUITY_SCORE: 18
ADLS_ACUITY_SCORE: 21
ADLS_ACUITY_SCORE: 20
ADLS_ACUITY_SCORE: 21
ADLS_ACUITY_SCORE: 18
ADLS_ACUITY_SCORE: 20
ADLS_ACUITY_SCORE: 21
ADLS_ACUITY_SCORE: 18
ADLS_ACUITY_SCORE: 20
ADLS_ACUITY_SCORE: 21
ADLS_ACUITY_SCORE: 18

## 2023-01-09 NOTE — PLAN OF CARE
[id number]  Neuro: A&Ox4.   Cardiac: VSS.   Respiratory: Sating >90% on NC 2L.  GI/: Voiding spontaneously  Diet/appetite: Tolerating regular diet. Eating well.  Activity: SBA, up to commode.  Pain: L back, flank.   Skin: No new deficits noted.  LDA's: Port  Ketamine running @4mg/hr. LR continuous 75ml/hr.    Plan: Continue with POC. Notify primary team with changes.

## 2023-01-09 NOTE — PLAN OF CARE
"  SHIFT:  [id number]  SITUATION:  22  year old female admitted with acute chest wall pain and possible acute chest syndrome.  PMH includes:  HB sliding scale disease, sickle cell pain crises, chronic PE without  acute cor pulmonale, acute chest syndrome, and right hemiparesis and hemiplegia following remote cerebral infarction.   VITALS: /55 (BP Location: Right arm)   Pulse 90   Temp 97.4  F (36.3  C) (Oral)   Resp 18   Ht 1.626 m (5' 4\")   Wt 72.3 kg (159 lb 8 oz)   SpO2 99%   BMI 27.38 kg/m     PAIN: Pain partially manage with IV ketamine continuous infusion and prn IV morphine.   Ketoloric scheduled.Declines voltaren. States she took oxycodone 15 mg q 4 hours prn at home for pain  CARDIAC: Tachycardic  NEURO: Alert/ox4. Lethargic, soft spoken  RESPIRATORY:  GI/: Adequate UO Last BM 1-8-23  DIET: Regular   ACTIVITY: SBA  LINES/DRAINS:  LUE PIV infusing ketamine gtt @ 4mg/hr, left port  SL infusing LR @ 75 ml/hr  SKIN: WNL  EVENTS OF SHIFT: Patient slept between cares.  Continuous ketamine infusion.  PLAN:  Continue to evaluate and assess pain control, health psych consult, nutrition,  Goal Outcome Evaluation:      Plan of Care Reviewed With: patient    Outcome Evaluation: Patient c/o sharp pain managed with IV ketamine PCA @ 4mg/hr (2ml/hr) and prn morphine IV.  Patient given trazadone as well. Declines acetaminophen.  Patient preferred to be curled up in fetal position majority of shift.  Up this am once to bedside commode with 900 ml dark, jesica urine.  Patient has contracted right arm and is left handed. Able to stand and pivot to bedside commode with sba.   Satting above >92 % with  2L oxygen per oxymask.  Last BM 24 hours ago prior to admission.      "

## 2023-01-09 NOTE — PROVIDER NOTIFICATION
Critical Test Results/Notification    Critical lab result (name and value): critical Hgb of 6.67  What time did lab notify you? 1106  What provider did you notify? Jamaal Garcia MD  Was the provider notified within 30 min? yes  Why was provider not notified? n/a    Response from provider: provider called and read back result, also said that will not transfuse at this point, and will notify hemotology of the result.

## 2023-01-09 NOTE — PROGRESS NOTES
Hematology  Daily Progress Note   Date of Service: 01/09/2023    Patient: Jennifer Cervantes  MRN: [id number]  Admission Date: 1/8/2023  Hospital Day # Hospital Day: 2      Initial Reason for Consult: Sickle Cell Pain, ?Acute Chest    Assessment & Plan:   Jennifer Cervantes is a 22 year old female with HgbSS complicated by frequent pain crises (acute and chronic components and maintained on chronic PO opioids and twice weekly infusion visits), history of stroke leading to significant cognitive delays and right upper extremity hemiparesis, iron overload 2/2 chronic transfusions as secondary ppx post-CVA, anxiety/depression, asthma, recurrent VTE/PE but inability to remain therapeutic on anticoagulation despite adherence, who was admitted 1/8 after presenting to ED with severe right chest pain without associated symptoms of fever or shortness of breath.  Given leukocytosis, hypoxia and CXR findings of perihilar and bibasilar opacities she was started on antibiotics and supplemental O2.  When seen later on 1/8 she was off oxygen with adequate oxygenation and felt pain was preventing a deep breath and pain is c/w sickle cell pain. Intermittently on supplemental O2 since that time. VQ scan negative for PE. At this time there is no indication for exchange or simple transfusion.     Recommendations:     General Sickle Cell Management   - Monitor CBC w/ diff, retic count, bilirubin daily  - Please discuss transfusions with hematology first              -No transfusion indicated today but will need close monitoring of oxygen status  - Continue hydroxyurea 3000 mg daily   - Continue folic acid     Sickle cell pain  Right chest wall pain  Hypoxia  - PE work-up unremarkable.   - Agree with antibiotics per primary team   - Continue aggressive pain control per primary team and care plan              -Per plan avoid PCA in the hospital, will follow outpatient infusion center plan (previously morphine, recently wanted to change to  dilaudid)  - Consider scheduled APAP, NSAIDs (ketorolac or ibuprofen), gabapentin.  - Consider topicals (lidocaine patch, diclofenac gel, BenGay, warm packs) if patient finds these helpful  - OK to increase ketamine gtt to 6mg/hr if desired by patient    Acute chest prevention:  - CXR at admission showed perihilar and bibasilar opacities, likely atelectasis  - Monitor O2 sats and vitals  - Encourage incentive spirometry q1h WA  - Encourage ambulation, consider PT consult  - Monitor for fluid overload and discontinue fluids if taking adequate po  - Please page hematology if patient develops fever, acute shortness of breath or other symptoms of acute chest syndrome.      Recurrent VTE/PE   Hx of stroke  - On Asa BID since 2/2022 due to failure of xarelto, apixaban, dabigatran, warfarin previously   - Continue asa BID     Follow up  - Will continue to follow while inpatient.  - Next outpatient follow up to be arranged.      Patient was seen and plan of care was discussed with attending physician Dr. Alvarado.    We will continue to follow this patient. Please don't hesitate to contact the Fellow On-Call with questions.    Amari Maldonado (MS4)  Medical Student, Hematology  [facility]        Physician Attestation   I, Elmira Alvarado MD/PhD, was present with the medical/ANDREAS student who participated in the service and in the documentation of the note.  I have verified the history and personally performed the physical exam and medical decision making.  I agree with the assessment and plan of care as documented in the note.      Key findings: patient without PE. Acute focal pain. Patient states topicals have not helped in past.     35 MINUTES SPENT BY ME on the date of service doing chart review, history, exam, documentation & further activities per the note.    I have personally reviewed the following data over the past 24 hrs:    12.6 (H)  \   6.7 (LL)   / 359     139 107 7.5 /  95   3.8 18 (L) 0.54 \  "      ALT: 14 AST: 43 (H) AP: 75 TBILI: 2.4 (H)   ALB: 3.6 TOT PROTEIN: 6.3 (L) LIPASE: N/A       Ferritin:  N/A % Retic:  19.5 (H) LDH:  N/A         Elmira Alvarado MD/PhD  Date of Service (when I saw the patient): 01/09/23    ___________________________________________________________________    Subjective & Interval History:    No acute events noted overnight. Pain ongoing overnight, intermittently requiring up to 2L supplemental O2. 1L O2 this afternoon. Pain ongoing, describes it as a \"bruise\" feeling on her side. Pain and breathing improved since yesterday. Feeling tired.       Physical Exam:    /52 (BP Location: Left arm)   Pulse 84   Temp 97  F (36.1  C) (Axillary)   Resp 18   Ht 1.626 m (5' 4\")   Wt 72.3 kg (159 lb 8 oz)   SpO2 95%   BMI 27.38 kg/m    Gen: Well appearing, in NAD  HEENT: EOMI, PERRL, mmm, oropharynx clear  CV: Normal rate, regular rhythm. No m/r/g  Pulm: no wheezing, normal work of breathing  Abd: Soft, nt/nd, no rebound/guarding  Ext: Warm and well perfused. No lower extremity edema  Skin: No rash, cyanosis or petechial lesion  Neuro: Alert and answering questions appropriately. CNII-XII grossly intact. Exam remarkable for R sided spasm and weakness, baseline.     Labs & Studies: I personally reviewed the following studies:  ROUTINE LABS (Last four results):  CMP  Recent Labs   Lab 01/09/23  1045 01/08/23  0952    132*   POTASSIUM 3.8 3.5   CHLORIDE 107 103   CO2 18* 16*   ANIONGAP 14 13   GLC 95 94   BUN 7.5 12.2   CR 0.54 0.59   GFRESTIMATED >90 >90   MICAH 8.4* 8.5*   PROTTOTAL 6.3* 6.9   ALBUMIN 3.6 4.0   BILITOTAL 2.4* 3.0*   ALKPHOS 75 80   AST 43* 48*   ALT 14 27     CBC  Recent Labs   Lab 01/09/23  1045 01/08/23  0947   WBC 12.6* 20.4*   RBC 2.13* 2.31*   HGB 6.7* 7.4*   HCT 19.6* 20.4*   MCV 92 88   MCH 31.5 32.0   MCHC 34.2 36.3   RDW 25.3* 25.2*    411     INRNo lab results found in last 7 days.    Medications list for reference:  Current " Facility-Administered Medications   Medication     acetaminophen (TYLENOL) tablet 975 mg     albuterol (PROVENTIL) neb solution 5 mg     alum & mag hydroxide-simethicone (MAALOX) suspension 30 mL     aspirin (ASA) chewable tablet 81 mg     cefTRIAXone (ROCEPHIN) 1 g vial to attach to  mL bag for ADULTS or NS 50 mL bag for PEDS     diclofenac (VOLTAREN) 1 % topical gel 4 g     doxycycline hyclate (VIBRAMYCIN) capsule 100 mg     FLUoxetine (PROzac) capsule 10 mg     fluticasone-vilanterol (BREO ELLIPTA) 200-25 MCG/ACT inhaler 1 puff     folic acid (FOLVITE) tablet 1 mg     hydroxyurea (HYDREA) capsule 3,000 mg     ketamine (KETALAR) 2 mg/mL in sodium chloride 0.9 % 50 mL ANALGESIA infusion     ketorolac (TORADOL) injection 30 mg     lidocaine (LMX4) cream     lidocaine 1 % 1 mL     melatonin tablet 3 mg     ondansetron (ZOFRAN) tablet 8 mg     oxyCODONE (ROXICODONE) tablet 15 mg     senna-docusate (SENOKOT-S/PERICOLACE) 8.6-50 MG per tablet 1 tablet    Or     senna-docusate (SENOKOT-S/PERICOLACE) 8.6-50 MG per tablet 2 tablet     sodium chloride (PF) 0.9% PF flush 3 mL     sodium chloride (PF) 0.9% PF flush 3 mL     traZODone (DESYREL) tablet 50 mg

## 2023-01-09 NOTE — PLAN OF CARE
"Reason for admission: acute chest syndrome, sickle cell crisis  Admitted from: [facility]  Report received from: Kenny Perdue RN skin assessment completed by: not completed- pt declined due to pain       -Findings:   Bed Algorithm reevaluated: Y  Was Pulsate ordered?: N  Care plan (primary problem) and Education initiated:  Y  Flu shot ordered (October-April only): no  Detailed Belongings: see summary of care note        RN Decompensation Assessment (Repeat at 12 hours)    (Additional guidance for RRT)      Mentation:  Exam is notable for normal (baseline) mental status    Respiratory mechanics and oxygenation:  Exam is notable for stable oxygen requirements    Cardiovascular/perfusion:   Exam is notable for normal/unchanged cardiovascular/perfusion assessment    Overall estimation of the patient s condition/stability (1 = stable patient, 7 = appears very sick)? 5         Goal Outcome Evaluation: Pt alert and oriented x4. Left chest, back/flank pain \"10\"/10 upon arrival to unit. Pt crying in pain. Continuous ketamine was infusing upon arrival. PRN morphine administered x2, warm packs, calm/quiet environment provided. Pain decreased to \"9\"/10. Pt agitated due to pain. Pt requested staff \"stop talking, I am in so much pain.\" Pt declined full skin assessment due to pain. Refused some medications. Continue to assess pain and effectiveness of interventions. Complete skin assessment when able. Needs UA collected. Pt has not voided since arrival to unit.     Plan of Care Reviewed With: patient    Overall Patient Progress: no changeOverall Patient Progress: no change    Outcome Evaluation: Admit from ED. Arrived crying in pain despite continuous ketamine infusion. Pain not well controlled.      "

## 2023-01-09 NOTE — PROGRESS NOTES
[facility]    Medicine Progress Note - Hospitalist Service, GOLD TEAM 13    Date of Admission:  1/8/2023    Assessment & Plan      Jennifer Cervantes is a 22 year old female with a past medical history including Hb-SS disease, sickle cell pain crises, chronic PE without acute cor pulmonale, acute chest syndrome, and right sided hemiplegia and hemiparesis following remote cerebral infarction now admitted for management of acute chest wall pain and possible acute chest syndrome.     #Sickle Cell Pain Crisis  #Acute chest: Mild   #Hx Sickle Cell (SS) with hx CVA, acute chest pulm htn, chronic PE  Presented with acute onset chest wall pain consistent with previous vasoocclusive pain. Home pain management with oxycodone 15mg q4h PRN, has been taking more or less scheduled recently due to pain being worse in winter.  Unable to have acute pain infusion appt Friday 1/6.  Care plan in place followed in ED, will initiate inpatient care per pain plan.  Discussed with hematology, severe pain when evaluated, reasonable to give IV pain control consistent with outpatient infusion plan, then transition to inpatient plan with oral oxy, ketamine, ASA. Ketorolac has also been helpful, will continue. Regarding acute chest, hypoxia with white count and streaky atelectasis in ED, neg VQ scan, breathing comfortably with non-focal exam, no tachypnea or increased work of breathing, denies respiratory symptoms on my exam; persistent O2 demand, slowly weaning.  - S/P IVF and IV morphine, now complete, no further IV opiate  - Pain mgmt per inpatient care plan:    ketamine gtt at 4mg/hr, may increase to 6mg/hr if needed(previously felt bad at 8mg/hr)    Opiates:Oxycodone 15mg q4h PRN   Voltaren gel PRN   Continue PTA ASA   - Ketorolac 30mg q6h  - Senna/docusate while on opiates  - Acute chest syndrome, mild: continue ceftriaxone/doxycycline   - VQ scan negative  - Continue PTA hydroxyuria  - Hold  "deferasirox pending heme    #Hyponatremia: Poor PO intake, corrected s/p fluid resuscitation   #Hx of CVA: continue PTA ASA BID  #Hx asthma: cont PTA budesonide/formoterol, PRN albuterol  #Depression: cont PTA prozac, health psych consulted.        Diet: Regular Diet Adult    DVT Prophylaxis: BID ASA  Lopez Catheter: Not present  Lines: PRESENT      Port A Cath Single 04/21/21 Left Chest wall-Site Assessment: WDL      Cardiac Monitoring: None  Code Status: Full Code      Clinically Significant Risk Factors          # Hypocalcemia: Lowest Ca = 8.4 mg/dL in last 2 days, will monitor and replace as appropriate               # Overweight: Estimated body mass index is 27.38 kg/m  as calculated from the following:    Height as of this encounter: 1.626 m (5' 4\").    Weight as of this encounter: 72.3 kg (159 lb 8 oz)., PRESENT ON ADMISSION         Disposition Plan      Expected Discharge Date: 01/11/2023        Discharge Comments: Disposition: Home with no new needs  Delay: Sickle cell crisis  Progress: IVF, pain mgmt          Jamaal Garcia MD  Hospitalist Service, GOLD TEAM 13  [facility]  Securely message with [employer] (more info)  Text page via [facility] Paging/Directory   See signed in provider for up to date coverage information  ______________________________________________________________________    Interval History   No acute events overnight.  Received morphine instead of oxycodone due to miscommunication.  Pain today improved but still present, not yet resolved.  Pt discussed focusing on utilizing oxycodone today. Felt ketorolac was helpful, would like it continued.  Not yet up for the day, but goal to walk on the wards.  Hoping pain will improve by this afternoon to allow her to return home.     Physical Exam   Vital Signs: Temp: 97  F (36.1  C) Temp src: Axillary BP: 109/52 Pulse: 84   Resp: 18 SpO2: 95 % O2 Device: Oxymask Oxygen Delivery: 1 LPM  Weight: 159 lbs 8 " oz     GA: Resting comfortably in bed  HEENT: EOMI, anicteric sclerae.  MMM, no oral lesions  CV: RRR, soft systolic murmur along left parasternal   Pulm: CTAB, no wheezing, no prolonged expiratory phase, no crackles  Abd: Soft, NT/ND, no organomegaly   Skin: no new rash, no bruising.        Medical Decision Making       30 MINUTES SPENT BY ME on the date of service doing chart review, history, exam, documentation & further activities per the note.      Data     I have personally reviewed the following data over the past 24 hrs:    12.6 (H)  \   6.7 (LL)   / 359     139 107 7.5 /  95   3.8 18 (L) 0.54 \       ALT: 14 AST: 43 (H) AP: 75 TBILI: 2.4 (H)   ALB: 3.6 TOT PROTEIN: 6.3 (L) LIPASE: N/A       Ferritin:  N/A % Retic:  19.5 (H) LDH:  N/A       Imaging results reviewed over the past 24 hrs:   Recent Results (from the past 24 hour(s))   NM Lung Scan Ventilation and Perfusion    Narrative    Examination:NM LUNG SCAN VENTILATION AND PERFUSION, 1/8/2023 5:46 PM     Indication:  right chest pain, hypoxic, tachycardic, elevated d-dimer,  h/o PE     Additional Information: Acute onset chest wall pain with history of  chronic pulmonary embolism, pulmonary hypertension, and sickle cell  disease.    Technique:  The patient received 1.9 mCi of Tc-99m DTPA aerosol for ventilation  and 6.2 mCi of Tc-99m MAA for perfusion. Multiple images were obtained  of the lungs in Anterior, posterior, RICHTER, RPO, LPO, and  TAE  projections.    Comparison: Chest x-ray 1/8/2023    Findings:  Ventilation images demonstrate relatively homogeneous distribution of  radiotracer.    Perfusion images demonstrate relatively homogeneous distribution of  radiotracer. There are matched defects in left lower lobe and along  the left fissure. No definite mismatched ventilation-perfusion defects  identified.      Impression    Impression:  Pulmonary emboli is not present.    I have personally reviewed the examination and initial interpretation  and I  agree with the findings.    ANGEL WHITE MD         SYSTEM ID:  [id number]

## 2023-01-09 NOTE — PLAN OF CARE
"/52 (BP Location: Left arm)   Pulse 84   Temp 97  F (36.1  C) (Axillary)   Resp 18   Ht 1.626 m (5' 4\")   Wt 72.3 kg (159 lb 8 oz)   SpO2 95%   BMI 27.38 kg/m      Care from: [id number]      VS & Pain: VSS, weaned down from 2 to 1 LPM Oxymask, cont Ketamine gtt, scheduled Toradol, and prn Oxy x1 were given for bilateral flank and back pain    Neuro: A&O x4    Respiratory: dyspnea on exertion    Cardiac: WDL    Peripheral neurovascular: WDL    GI/: voids spontaneously, no BM this shift    Nutrition: good appetite and oral intake    Skin: pt declined skin assessment d/t pain    Musculoskeletal: RUE mobility is severely impaired, absent of R hand ; RLE mobility is moderately impaired, dorsiflexion and plantarflexion are weak    Lines: L PIV is infusing with NS and cont Ketamine gtt at 4 mg/hr, L port a cath is saline locked    Activity: up with SBA and gait belt    Events this shift: Hgb was 6.67, no transfusion needed per provider. Call light within reach.    Plan: Continue to follow poc.      Goal Outcome Evaluation:    Plan of Care Reviewed With: patient    Overall Patient Progress: improving    Outcome Evaluation: prn Morphine was discontinued; continuous ketamine gtt, scheduled Toradol, and prn Oxy were given for bilateral flank and back pain      "

## 2023-01-10 ENCOUNTER — PATIENT OUTREACH (OUTPATIENT)
Dept: ONCOLOGY | Facility: CLINIC | Age: 23
End: 2023-01-10

## 2023-01-10 LAB
ALBUMIN SERPL BCG-MCNC: 3.6 G/DL (ref 3.5–5.2)
ALP SERPL-CCNC: 73 U/L (ref 35–104)
ALT SERPL W P-5'-P-CCNC: 17 U/L (ref 10–35)
ANION GAP SERPL CALCULATED.3IONS-SCNC: 13 MMOL/L (ref 7–15)
AST SERPL W P-5'-P-CCNC: 40 U/L (ref 10–35)
BILIRUB SERPL-MCNC: 2.9 MG/DL
BUN SERPL-MCNC: 6.2 MG/DL (ref 6–20)
CALCIUM SERPL-MCNC: 8.4 MG/DL (ref 8.6–10)
CHLORIDE SERPL-SCNC: 108 MMOL/L (ref 98–107)
CREAT SERPL-MCNC: 0.55 MG/DL (ref 0.51–0.95)
DEPRECATED HCO3 PLAS-SCNC: 18 MMOL/L (ref 22–29)
ERYTHROCYTE [DISTWIDTH] IN BLOOD BY AUTOMATED COUNT: 26 % (ref 10–15)
GFR SERPL CREATININE-BSD FRML MDRD: >90 ML/MIN/1.73M2
GLUCOSE SERPL-MCNC: 98 MG/DL (ref 70–99)
HCT VFR BLD AUTO: 17.7 % (ref 35–47)
HGB BLD-MCNC: 6.3 G/DL (ref 11.7–15.7)
MCH RBC QN AUTO: 32.1 PG (ref 26.5–33)
MCHC RBC AUTO-ENTMCNC: 35.6 G/DL (ref 31.5–36.5)
MCV RBC AUTO: 90 FL (ref 78–100)
PLATELET # BLD AUTO: 314 10E3/UL (ref 150–450)
POTASSIUM SERPL-SCNC: 3.8 MMOL/L (ref 3.4–5.3)
PROT SERPL-MCNC: 6.3 G/DL (ref 6.4–8.3)
RBC # BLD AUTO: 1.96 10E6/UL (ref 3.8–5.2)
RETICS # AUTO: 0.36 10E6/UL (ref 0.03–0.1)
RETICS/RBC NFR AUTO: 18.4 % (ref 0.5–2)
SODIUM SERPL-SCNC: 139 MMOL/L (ref 136–145)
WBC # BLD AUTO: 12 10E3/UL (ref 4–11)

## 2023-01-10 PROCEDURE — 85027 COMPLETE CBC AUTOMATED: CPT | Performed by: PEDIATRICS

## 2023-01-10 PROCEDURE — 36592 COLLECT BLOOD FROM PICC: CPT | Performed by: PEDIATRICS

## 2023-01-10 PROCEDURE — 250N000013 HC RX MED GY IP 250 OP 250 PS 637: Performed by: STUDENT IN AN ORGANIZED HEALTH CARE EDUCATION/TRAINING PROGRAM

## 2023-01-10 PROCEDURE — 250N000013 HC RX MED GY IP 250 OP 250 PS 637: Performed by: PEDIATRICS

## 2023-01-10 PROCEDURE — 250N000011 HC RX IP 250 OP 636: Performed by: INTERNAL MEDICINE

## 2023-01-10 PROCEDURE — 120N000002 HC R&B MED SURG/OB UMMC

## 2023-01-10 PROCEDURE — 80053 COMPREHEN METABOLIC PANEL: CPT | Performed by: STUDENT IN AN ORGANIZED HEALTH CARE EDUCATION/TRAINING PROGRAM

## 2023-01-10 PROCEDURE — 250N000013 HC RX MED GY IP 250 OP 250 PS 637: Performed by: INTERNAL MEDICINE

## 2023-01-10 PROCEDURE — 250N000011 HC RX IP 250 OP 636: Performed by: HOSPITALIST

## 2023-01-10 PROCEDURE — 99232 SBSQ HOSP IP/OBS MODERATE 35: CPT | Performed by: INTERNAL MEDICINE

## 2023-01-10 PROCEDURE — 250N000011 HC RX IP 250 OP 636: Performed by: STUDENT IN AN ORGANIZED HEALTH CARE EDUCATION/TRAINING PROGRAM

## 2023-01-10 PROCEDURE — 99232 SBSQ HOSP IP/OBS MODERATE 35: CPT | Mod: GC | Performed by: INTERNAL MEDICINE

## 2023-01-10 PROCEDURE — 85045 AUTOMATED RETICULOCYTE COUNT: CPT | Performed by: PEDIATRICS

## 2023-01-10 RX ORDER — NALOXONE HYDROCHLORIDE 0.4 MG/ML
0.4 INJECTION, SOLUTION INTRAMUSCULAR; INTRAVENOUS; SUBCUTANEOUS
Status: DISCONTINUED | OUTPATIENT
Start: 2023-01-10 | End: 2023-01-16 | Stop reason: HOSPADM

## 2023-01-10 RX ORDER — HYDROMORPHONE HYDROCHLORIDE 1 MG/ML
0.5 INJECTION, SOLUTION INTRAMUSCULAR; INTRAVENOUS; SUBCUTANEOUS
Status: COMPLETED | OUTPATIENT
Start: 2023-01-10 | End: 2023-01-10

## 2023-01-10 RX ORDER — NALOXONE HYDROCHLORIDE 0.4 MG/ML
0.2 INJECTION, SOLUTION INTRAMUSCULAR; INTRAVENOUS; SUBCUTANEOUS
Status: DISCONTINUED | OUTPATIENT
Start: 2023-01-10 | End: 2023-01-16 | Stop reason: HOSPADM

## 2023-01-10 RX ORDER — HYDROMORPHONE HYDROCHLORIDE 1 MG/ML
0.5 INJECTION, SOLUTION INTRAMUSCULAR; INTRAVENOUS; SUBCUTANEOUS EVERY 4 HOURS PRN
Status: DISCONTINUED | OUTPATIENT
Start: 2023-01-10 | End: 2023-01-13

## 2023-01-10 RX ORDER — TRAMADOL HYDROCHLORIDE 50 MG/1
50 TABLET ORAL EVERY 6 HOURS PRN
Status: DISCONTINUED | OUTPATIENT
Start: 2023-01-10 | End: 2023-01-16 | Stop reason: HOSPADM

## 2023-01-10 RX ADMIN — ACETAMINOPHEN 975 MG: 325 TABLET, FILM COATED ORAL at 21:41

## 2023-01-10 RX ADMIN — CEFTRIAXONE SODIUM 1 G: 1 INJECTION, POWDER, FOR SOLUTION INTRAMUSCULAR; INTRAVENOUS at 12:56

## 2023-01-10 RX ADMIN — HYDROMORPHONE HYDROCHLORIDE 0.5 MG: 1 INJECTION, SOLUTION INTRAMUSCULAR; INTRAVENOUS; SUBCUTANEOUS at 19:09

## 2023-01-10 RX ADMIN — FLUOXETINE 10 MG: 10 CAPSULE ORAL at 08:45

## 2023-01-10 RX ADMIN — OXYCODONE HYDROCHLORIDE 15 MG: 10 TABLET ORAL at 18:05

## 2023-01-10 RX ADMIN — TRAZODONE HYDROCHLORIDE 50 MG: 50 TABLET ORAL at 21:41

## 2023-01-10 RX ADMIN — OXYCODONE HYDROCHLORIDE 15 MG: 10 TABLET ORAL at 01:28

## 2023-01-10 RX ADMIN — FLUTICASONE FUROATE AND VILANTEROL TRIFENATATE 1 PUFF: 200; 25 POWDER RESPIRATORY (INHALATION) at 11:14

## 2023-01-10 RX ADMIN — HYDROXYUREA 3000 MG: 500 CAPSULE ORAL at 08:55

## 2023-01-10 RX ADMIN — SENNOSIDES AND DOCUSATE SODIUM 2 TABLET: 8.6; 5 TABLET ORAL at 20:10

## 2023-01-10 RX ADMIN — HYDROMORPHONE HYDROCHLORIDE 0.5 MG: 1 INJECTION, SOLUTION INTRAMUSCULAR; INTRAVENOUS; SUBCUTANEOUS at 23:17

## 2023-01-10 RX ADMIN — KETOROLAC TROMETHAMINE 30 MG: 30 INJECTION, SOLUTION INTRAMUSCULAR at 05:21

## 2023-01-10 RX ADMIN — TRAMADOL HYDROCHLORIDE 50 MG: 50 TABLET, COATED ORAL at 14:40

## 2023-01-10 RX ADMIN — DICLOFENAC SODIUM 4 G: 10 GEL TOPICAL at 20:05

## 2023-01-10 RX ADMIN — DOXYCYCLINE HYCLATE 100 MG: 100 CAPSULE ORAL at 20:05

## 2023-01-10 RX ADMIN — HYDROMORPHONE HYDROCHLORIDE 0.5 MG: 1 INJECTION, SOLUTION INTRAMUSCULAR; INTRAVENOUS; SUBCUTANEOUS at 03:10

## 2023-01-10 RX ADMIN — ASPIRIN 81 MG: 81 TABLET, CHEWABLE ORAL at 20:05

## 2023-01-10 RX ADMIN — FOLIC ACID 1 MG: 1 TABLET ORAL at 08:44

## 2023-01-10 RX ADMIN — ASPIRIN 81 MG: 81 TABLET, CHEWABLE ORAL at 08:44

## 2023-01-10 RX ADMIN — HYDROMORPHONE HYDROCHLORIDE 1 MG: 1 INJECTION, SOLUTION INTRAMUSCULAR; INTRAVENOUS; SUBCUTANEOUS at 15:20

## 2023-01-10 RX ADMIN — TRAMADOL HYDROCHLORIDE 50 MG: 50 TABLET, COATED ORAL at 21:41

## 2023-01-10 RX ADMIN — DOXYCYCLINE HYCLATE 100 MG: 100 CAPSULE ORAL at 08:45

## 2023-01-10 ASSESSMENT — ACTIVITIES OF DAILY LIVING (ADL)
ADLS_ACUITY_SCORE: 21

## 2023-01-10 NOTE — PROGRESS NOTES
[facility]: Cancer Care                                                                                        Left message for pt that we are unable to pre schedule her for infusion this week due to being on a waitlist.  Advised that if she feels like she needs an infusion, to call at 7am like she is used to to get on the daily waitlist.  IB sent to inpatient PA to make her aware of the scheduling difficulties.       Signature:  Arely Krueger RN

## 2023-01-10 NOTE — PLAN OF CARE
Time of care: 1900-0730    22 y.o. female admitted with sickle cell crisis. Hx chronic PE. She has Right sided weakness and bilateral flank pain with a ketamine drip running at 4mg/2ml/h. She is on continuous O2 monitoring, Oxygen mask at 1L/min (O2 dips at RA but the plan is to wean off and keep O2 above 92. Vsq8, no BG, reg diet, SBA. L PIV TKO. Oxy prescribed PRN and scheduled Toradol for pain. Last hemoglobin was 6.7. She is alert and oriented x4, able to make needs known, slept well between cares. Continue to monitor and follow POC.     Goal Outcome Evaluation: Ongoing, progressing    Plan of Care Reviewed With: patient    Overall Patient Progress: no change

## 2023-01-10 NOTE — TELEPHONE ENCOUNTER
Action January 9, 2023 10:43 PM MT   Action Taken Will need to call patient during business hours and tt:  team about previous surgery.     Action January 26, 2023 3:02 PM MT   Action Taken Called patient, patient states that she had [facility] with  @ [facility]. Patient to sign JULIANNE in clinic if the Operative Report from 2018 is needed.      Action February 3, 2023 8:21 AM MT   Action Taken JULIANNE was signed and received in clinic for the patient's appointment. [facility] a request to [facility] for records.  J[facility]NE sent to [facility].    2.10.23 1:04 PM -- [facility] from [facility] stating that they don't have records for the requested date.10.24.18.       DIAGNOSIS: Spastic hemiplegia of right dominant side as late effect of cerebral infarction.   APPOINTMENT DATE: 02/02/2023   NOTES STATUS DETAILS   OFFICE NOTE from referring provider Ortho Eric Phipps MD - [facility]   OFFICE NOTE from other specialist Internal 01/26/2023 - Katherine Pierce MD - [facility]    10/01/2020 - Vicenta Franco MD - [facility]   [facility]AT[facility] RE[facility]    Greyson right elbow flexor pronator plasty, brachialis lengthening, biceps tenotomy, and thenar release in on October 24, 2018   MEDICATION LIST Internal    EMG (for Spine) Internal 10/12/2022, 07/15/2022, 04/15/2022, 01/12/2022, 10/13/2021 - EMG Guided Chemodenervation   IMPLANT RECORD/STICKER Internal 04/21/2021 - CATH PORT POWERPORT   LABS     CBC/DIFF Internal 01/18/2023   ULTRASOUND PACS Internal:  09/05/2022, 04/20/2022, 06/11/2021, 03/25/2021, 11/16/2021 - RT Upper Extremity

## 2023-01-10 NOTE — PROGRESS NOTES
[facility]    Medicine Progress Note - Hospitalist Service, GOLD TEAM 11    Date of Admission:  1/8/2023    Assessment & Plan      Jennifer Cervantes is a 22 year old female with a past medical history including Hb-SS disease, sickle cell pain crises, chronic PE without acute cor pulmonale, acute chest syndrome, and right sided hemiplegia and hemiparesis following remote cerebral infarction now admitted for management of acute chest wall pain and possible acute chest syndrome.     #AcuteSickle Cell Vaso-occlusive Pain Crisis  #Acute chest: Mild   #Hx Sickle Cell (SS) with hx CVA, acute chest pulm htn, chronic PE  Presented with acute onset chest wall pain consistent with previous vasoocclusive pain. Home pain management with oxycodone 15mg q4h PRN, has been taking more or less scheduled recently due to pain being worse in winter.  Unable to have acute pain infusion appt Friday 1/6.  Care plan in place followed in ED, will initiate inpatient care per pain plan.  Discussed with hematology, severe pain when evaluated, reasonable to give IV pain control consistent with outpatient infusion plan, then transition to inpatient plan with oral oxy, ketamine, ASA. Ketorolac has also been helpful, will continue. Regarding acute chest, hypoxia with white count and streaky atelectasis in ED, neg VQ scan, breathing comfortably with non-focal exam, no tachypnea or increased work of breathing, denies respiratory symptoms on my exam; persistent O2 demand, slowly weaning.  - Pain mgmt per inpatient care plan:    ketamine gtt at 6mg/hr(previously felt bad at 8mg/hr)    Opiates:Oxycodone 15mg q4h PRN, Dilaudid 1mg q4h prn   Voltaren gel PRN   Continue PTA ASA   - s/p Ketorolac 30mg q6h  - Senna/docusate while on opiates  - Acute chest syndrome, mild:    - VQ scan negative.    - Tx: IS q1h while awake, encourage ambulation   - Abx: ceftriaxone/doxycycline   - Continue PTA hydroxyuria  - Hold  "deferasirox pending heme    #Hyponatremia- resolved:   - Poor PO intake, corrected s/p fluid resuscitation     #Hx of CVA: continue PTA ASA BID  #Hx asthma: cont PTA budesonide/formoterol, PRN albuterol  #Depression: cont PTA prozac, health psych consulted.        Diet: Regular Diet Adult    DVT Prophylaxis: BID ASA  Lopez Catheter: Not present  Lines: PRESENT      Port A Cath Single 04/21/21 Left Chest wall-Site Assessment: WDL      Cardiac Monitoring: None  Code Status: Full Code      Clinically Significant Risk Factors          # Hypocalcemia: Lowest Ca = 8.4 mg/dL in last 2 days, will monitor and replace as appropriate               # Overweight: Estimated body mass index is 27.38 kg/m  as calculated from the following:    Height as of this encounter: 1.626 m (5' 4\").    Weight as of this encounter: 72.3 kg (159 lb 8 oz)., PRESENT ON ADMISSION         Disposition Plan     Expected Discharge Date: 01/11/2023        Discharge Comments: Disposition: Home with no new needs  Delay: Sickle cell crisis  Progress: IVF, pain mgmt          Zaynab Madison MD  Hospitalist Service, GOLD TEAM 11  [facility]  Securely message with [employer] - Private Location App (more info)  Text page via [facility] Paging/Directory   See signed in provider for up to date coverage information  ______________________________________________________________________    Interval History   No acute events overnight. Patient reporting pain located in right side/flank pain. Worsening pain uncontrolled on PCA ketamine 4mg/hr. Will increase ketamine gtt to 6mg/hr and add prn dilaudid 1mg q4h prn to better control pain symptoms. Patient having increasing O2 NC given increasing pain, will try to control pain better and continue Acute chest abx as above. UA on admission unremarkable, consider repeating UA if continued flank pain and consider further renal testing/possible renal US.     Physical Exam   Vital Signs: Temp: 97.1  F (36.2  C) Temp " src: Axillary BP: 99/52 Pulse: 90   Resp: 18 SpO2: 94 % O2 Device: Oxymask Oxygen Delivery: 2 LPM  Weight: 159 lbs 8 oz    General: alert & oriented, MMM, EOMI w/o scleral icterus, neck FROM, pleasant  Resp: CTAB w/o focal findings, no increased WOB, on NC  Cards: RRR w/o murmurs/rubs/gallops, no JVD, no LE edema  GI: soft, pain in R.flank area  Skin: warm and well perfused  Neuro/MSK:  moving all 4 extremities equally, ambulating up to bathroom      Medical Decision Making       50 MINUTES SPENT BY ME on the date of service doing chart review, history, exam, documentation & further activities per the note.      Data     I have personally reviewed the following data over the past 24 hrs:    12.0 (H)  \   6.3 (LL)   / 314     139 108 (H) 6.2 /  98   3.8 18 (L) 0.55 \       ALT: 17 AST: 40 (H) AP: 73 TBILI: 2.9 (H)   ALB: 3.6 TOT PROTEIN: 6.3 (L) LIPASE: N/A       Ferritin:  N/A % Retic:  18.4 (H) LDH:  N/A       Admission COVID/Flu/RSV negative    Imaging results reviewed over the past 24 hrs:   No results found for this or any previous visit (from the past 24 hour(s)).     V/Q scan 1/8:   Pulmonary emboli is not present.

## 2023-01-10 NOTE — PROVIDER NOTIFICATION
Provider notified (name): Gold cross-cover: Dr. Hinojosa ([phone])  Reason for notification: Hgb 6.3  Recommendation/request given to provider:  Response from provider: Will refer to Hematology Day team as Hgb was 6.7 yesterday and patient was not transfused.

## 2023-01-10 NOTE — PROGRESS NOTES
Hematology  Daily Progress Note   Date of Service: 01/10/2023    Patient: Jennifer Cervantes  MRN: [id number]  Admission Date: 1/8/2023  Hospital Day # Hospital Day: 3      Initial Reason for Consult: Sickle Cell Pain, ?Acute Chest    Assessment & Plan:   Jennifer Cervantes is a 22 year old female with HgbSS complicated by frequent pain crises (acute and chronic components and maintained on chronic PO opioids and twice weekly infusion visits), history of stroke leading to significant cognitive delays and right upper extremity hemiparesis, iron overload 2/2 chronic transfusions as secondary ppx post-CVA, anxiety/depression, asthma, recurrent VTE/PE but inability to remain therapeutic on anticoagulation despite adherence, who was admitted 1/8 after presenting to ED with severe right chest pain without associated symptoms of fever or shortness of breath.  Given leukocytosis, hypoxia and CXR findings of perihilar and bibasilar opacities she was started on antibiotics and supplemental O2. Felt pain was preventing a deep breath and pain is c/w sickle cell pain. Requiring supplemental O2 since that time. VQ scan negative for PE. At this time there is no indication for exchange or simple transfusion, will continue to monitor oxygen status, Hgb, and for any new symptoms of ACS. Pain control continues to be poor, will add back IV Dilaudid in order to better support her today. She hopes to discharge tomorrow.     Recommendations:     General Sickle Cell Management   - Monitor CBC w/ diff, retic count, bilirubin daily  - Please discuss transfusions with hematology first              -No transfusion indicated today but will need close monitoring of oxygen status  - Continue hydroxyurea 3000 mg daily   - Continue folic acid     Sickle cell pain  Right chest wall pain  Hypoxia  - PE work-up unremarkable.   - Agree with antibiotics per primary team   - continue with home oxycontin 10 mg q12h  - increase oral oxycodone to 20 mg q4h   -  Increase ketamine to 6 mg per care plan  - OK to add 1 mg hydromorphone q2-3h IV PRN for breakthrough pain but would encourage patient to take oral(s) BEFORE receiving IV medication(s).  - Health Psychology consult  - Acupuncture consult    - Consider topicals (lidocaine patch, diclofenac gel, BenGay, warm packs) if patient finds these helpful    Acute chest prevention:  - CXR at admission showed perihilar and bibasilar opacities, likely atelectasis  - Monitor O2 sats and vitals  - Encourage incentive spirometry q1h WA  - Encourage ambulation, consider PT consult  - Monitor for fluid overload and discontinue fluids if taking adequate po  - Please page hematology if patient develops fever, acute shortness of breath or other symptoms of acute chest syndrome.      Recurrent VTE/PE   Hx of stroke  - On Asa BID since 2/2022 due to failure of xarelto, apixaban, dabigatran, warfarin previously   - Continue asa BID     Follow up  - Will continue to follow while inpatient.  - Next outpatient follow up to be arranged.      Patient was seen and plan of care was discussed with attending physician Dr. Alvarado.    We will continue to follow this patient. Please don't hesitate to contact the Fellow On-Call with questions.    Amari Maldonado ([email or web address])  Medical Student, Hematology  [facility]        Physician Attestation   I, Elmira Alvarado MD/PhD, was present with the medical/ANDREAS student who participated in the service and in the documentation of the note.  I have verified the history and personally performed the physical exam and medical decision making.  I agree with the assessment and plan of care as documented in the note.      Key findings: patient tearful about pain not being addressed. Is on oxygen mask- but takes off in room to eat, etc. Will have desaturations but does not feel she needs oxygen    patient stating she would like to go home as pain/symptoms are not being addressed. Discussed strategies to get  "symptoms addressed. Continue to monitor oxygen status.     I have personally reviewed the following data over the past 24 hrs:    12.0 (H)  \   6.3 (LL)   / 314     139 108 (H) 6.2 /  98   3.8 18 (L) 0.55 \       ALT: 17 AST: 40 (H) AP: 73 TBILI: 2.9 (H)   ALB: 3.6 TOT PROTEIN: 6.3 (L) LIPASE: N/A       Ferritin:  N/A % Retic:  18.4 (H) LDH:  N/A         Elmira Alvarado MD/PhD  Date of Service (when I saw the patient): 01/10/23    ___________________________________________________________________    Subjective & Interval History:    No acute events noted overnight. Desaturations into the mid 80s overnight, O2 increased from 1L oxymask to 2L. Continued on 1-2L O2 throughout the day, though does not feel any difficulty breathing. Pain continues, and worsening in the afternoon. She feels this has been poorly managed today. Would like to go home, but willing to stay if her pain control can be improved.       Physical Exam:    /56 (BP Location: Left arm)   Pulse 110   Temp 97.8  F (36.6  C) (Axillary)   Resp 20   Ht 1.626 m (5' 4\")   Wt 72.3 kg (159 lb 8 oz)   SpO2 92%   BMI 27.38 kg/m    Gen: Well appearing, in some distress/pain  HEENT: EOMI, PERRL, mmm, oropharynx clear  CV: Normal rate, regular rhythm. No m/r/g  Pulm: no wheezing, normal work of breathing  Abd: Soft, nt/nd, no rebound/guarding  Ext: Warm and well perfused. No lower extremity edema  Skin: No rash, cyanosis or petechial lesion  Neuro: Alert and answering questions appropriately. CNII-XII grossly intact. Exam remarkable for R sided spasm and weakness, baseline.     Labs & Studies: I personally reviewed the following studies:  ROUTINE LABS (Last four results):  CMP  Recent Labs   Lab 01/10/23  0642 01/09/23  1045 01/08/23  0952    139 132*   POTASSIUM 3.8 3.8 3.5   CHLORIDE 108* 107 103   CO2 18* 18* 16*   ANIONGAP 13 14 13   GLC 98 95 94   BUN 6.2 7.5 12.2   CR 0.55 0.54 0.59   GFRESTIMATED >90 >90 >90   MICAH 8.4* 8.4* 8.5* "   PROTTOTAL 6.3* 6.3* 6.9   ALBUMIN 3.6 3.6 4.0   BILITOTAL 2.9* 2.4* 3.0*   ALKPHOS 73 75 80   AST 40* 43* 48*   ALT 17 14 27     CBC  Recent Labs   Lab 01/10/23  0642 01/09/23  1045 01/08/23  0947   WBC 12.0* 12.6* 20.4*   RBC 1.96* 2.13* 2.31*   HGB 6.3* 6.7* 7.4*   HCT 17.7* 19.6* 20.4*   MCV 90 92 88   MCH 32.1 31.5 32.0   MCHC 35.6 34.2 36.3   RDW 26.0* 25.3* 25.2*    359 411     INRNo lab results found in last 7 days.    Medications list for reference:  Current Facility-Administered Medications   Medication     acetaminophen (TYLENOL) tablet 975 mg     albuterol (PROVENTIL) neb solution 5 mg     alum & mag hydroxide-simethicone (MAALOX) suspension 30 mL     aspirin (ASA) chewable tablet 81 mg     cefTRIAXone (ROCEPHIN) 1 g vial to attach to  mL bag for ADULTS or NS 50 mL bag for PEDS     diclofenac (VOLTAREN) 1 % topical gel 4 g     doxycycline hyclate (VIBRAMYCIN) capsule 100 mg     FLUoxetine (PROzac) capsule 10 mg     fluticasone-vilanterol (BREO ELLIPTA) 200-25 MCG/ACT inhaler 1 puff     folic acid (FOLVITE) tablet 1 mg     HYDROmorphone (PF) (DILAUDID) injection 0.5 mg     hydroxyurea (HYDREA) capsule 3,000 mg     ketamine (KETALAR) 2 mg/mL in sodium chloride 0.9 % 50 mL ANALGESIA infusion     lidocaine (LMX4) cream     lidocaine 1 % 1 mL     melatonin tablet 3 mg     naloxone (NARCAN) injection 0.2 mg    Or     naloxone (NARCAN) injection 0.4 mg    Or     naloxone (NARCAN) injection 0.2 mg    Or     naloxone (NARCAN) injection 0.4 mg     ondansetron (ZOFRAN) tablet 8 mg     oxyCODONE (ROXICODONE) tablet 15 mg     senna-docusate (SENOKOT-S/PERICOLACE) 8.6-50 MG per tablet 1 tablet    Or     senna-docusate (SENOKOT-S/PERICOLACE) 8.6-50 MG per tablet 2 tablet     sodium chloride (PF) 0.9% PF flush 3 mL     sodium chloride (PF) 0.9% PF flush 3 mL     traMADol (ULTRAM) tablet 50 mg     traZODone (DESYREL) tablet 50 mg

## 2023-01-11 ENCOUNTER — APPOINTMENT (OUTPATIENT)
Dept: GENERAL RADIOLOGY | Facility: CLINIC | Age: 23
DRG: 811 | End: 2023-01-11
Attending: HOSPITALIST
Payer: COMMERCIAL

## 2023-01-11 LAB
ABO/RH(D): NORMAL
ALBUMIN SERPL BCG-MCNC: 3.8 G/DL (ref 3.5–5.2)
ALP SERPL-CCNC: 69 U/L (ref 35–104)
ALT SERPL W P-5'-P-CCNC: 17 U/L (ref 10–35)
ANION GAP SERPL CALCULATED.3IONS-SCNC: 13 MMOL/L (ref 7–15)
ANTIBODY SCREEN: NEGATIVE
AST SERPL W P-5'-P-CCNC: 39 U/L (ref 10–35)
BILIRUB SERPL-MCNC: 2.8 MG/DL
BUN SERPL-MCNC: 5.6 MG/DL (ref 6–20)
CALCIUM SERPL-MCNC: 8.9 MG/DL (ref 8.6–10)
CHLORIDE SERPL-SCNC: 104 MMOL/L (ref 98–107)
CREAT SERPL-MCNC: 0.47 MG/DL (ref 0.51–0.95)
DEPRECATED HCO3 PLAS-SCNC: 18 MMOL/L (ref 22–29)
ERYTHROCYTE [DISTWIDTH] IN BLOOD BY AUTOMATED COUNT: 23.2 % (ref 10–15)
GFR SERPL CREATININE-BSD FRML MDRD: >90 ML/MIN/1.73M2
GLUCOSE SERPL-MCNC: 85 MG/DL (ref 70–99)
HCT VFR BLD AUTO: 18.6 % (ref 35–47)
HGB BLD-MCNC: 6.5 G/DL (ref 11.7–15.7)
MCH RBC QN AUTO: 31.6 PG (ref 26.5–33)
MCHC RBC AUTO-ENTMCNC: 34.9 G/DL (ref 31.5–36.5)
MCV RBC AUTO: 90 FL (ref 78–100)
PLATELET # BLD AUTO: 350 10E3/UL (ref 150–450)
POTASSIUM SERPL-SCNC: 4 MMOL/L (ref 3.4–5.3)
PROT SERPL-MCNC: 6.7 G/DL (ref 6.4–8.3)
RBC # BLD AUTO: 2.06 10E6/UL (ref 3.8–5.2)
RETICS # AUTO: 0.4 10E6/UL (ref 0.03–0.1)
RETICS/RBC NFR AUTO: 19.3 % (ref 0.5–2)
SODIUM SERPL-SCNC: 135 MMOL/L (ref 136–145)
SPECIMEN EXPIRATION DATE: NORMAL
WBC # BLD AUTO: 13.4 10E3/UL (ref 4–11)

## 2023-01-11 PROCEDURE — 250N000011 HC RX IP 250 OP 636: Performed by: INTERNAL MEDICINE

## 2023-01-11 PROCEDURE — 85027 COMPLETE CBC AUTOMATED: CPT | Performed by: PEDIATRICS

## 2023-01-11 PROCEDURE — 71045 X-RAY EXAM CHEST 1 VIEW: CPT

## 2023-01-11 PROCEDURE — 86901 BLOOD TYPING SEROLOGIC RH(D): CPT | Performed by: HOSPITALIST

## 2023-01-11 PROCEDURE — 94640 AIRWAY INHALATION TREATMENT: CPT

## 2023-01-11 PROCEDURE — 99233 SBSQ HOSP IP/OBS HIGH 50: CPT | Performed by: INTERNAL MEDICINE

## 2023-01-11 PROCEDURE — 120N000002 HC R&B MED SURG/OB UMMC

## 2023-01-11 PROCEDURE — 250N000009 HC RX 250: Performed by: INTERNAL MEDICINE

## 2023-01-11 PROCEDURE — 250N000013 HC RX MED GY IP 250 OP 250 PS 637: Performed by: INTERNAL MEDICINE

## 2023-01-11 PROCEDURE — 71045 X-RAY EXAM CHEST 1 VIEW: CPT | Mod: 26 | Performed by: RADIOLOGY

## 2023-01-11 PROCEDURE — 80053 COMPREHEN METABOLIC PANEL: CPT | Performed by: STUDENT IN AN ORGANIZED HEALTH CARE EDUCATION/TRAINING PROGRAM

## 2023-01-11 PROCEDURE — 999N000157 HC STATISTIC RCP TIME EA 10 MIN

## 2023-01-11 PROCEDURE — 250N000009 HC RX 250: Performed by: STUDENT IN AN ORGANIZED HEALTH CARE EDUCATION/TRAINING PROGRAM

## 2023-01-11 PROCEDURE — 250N000013 HC RX MED GY IP 250 OP 250 PS 637: Performed by: PEDIATRICS

## 2023-01-11 PROCEDURE — 258N000003 HC RX IP 258 OP 636: Performed by: INTERNAL MEDICINE

## 2023-01-11 PROCEDURE — 36591 DRAW BLOOD OFF VENOUS DEVICE: CPT | Performed by: HOSPITALIST

## 2023-01-11 PROCEDURE — 250N000011 HC RX IP 250 OP 636: Performed by: STUDENT IN AN ORGANIZED HEALTH CARE EDUCATION/TRAINING PROGRAM

## 2023-01-11 PROCEDURE — 85045 AUTOMATED RETICULOCYTE COUNT: CPT | Performed by: PEDIATRICS

## 2023-01-11 PROCEDURE — 250N000013 HC RX MED GY IP 250 OP 250 PS 637: Performed by: STUDENT IN AN ORGANIZED HEALTH CARE EDUCATION/TRAINING PROGRAM

## 2023-01-11 RX ORDER — FUROSEMIDE 10 MG/ML
20 INJECTION INTRAMUSCULAR; INTRAVENOUS ONCE
Status: COMPLETED | OUTPATIENT
Start: 2023-01-11 | End: 2023-01-11

## 2023-01-11 RX ORDER — POLYETHYLENE GLYCOL 3350 17 G/17G
17 POWDER, FOR SOLUTION ORAL 2 TIMES DAILY
Status: DISCONTINUED | OUTPATIENT
Start: 2023-01-11 | End: 2023-01-16 | Stop reason: HOSPADM

## 2023-01-11 RX ADMIN — OXYCODONE HYDROCHLORIDE 15 MG: 10 TABLET ORAL at 20:04

## 2023-01-11 RX ADMIN — SENNOSIDES AND DOCUSATE SODIUM 1 TABLET: 50; 8.6 TABLET ORAL at 20:04

## 2023-01-11 RX ADMIN — TRAZODONE HYDROCHLORIDE 50 MG: 50 TABLET ORAL at 21:37

## 2023-01-11 RX ADMIN — SENNOSIDES AND DOCUSATE SODIUM 2 TABLET: 8.6; 5 TABLET ORAL at 09:03

## 2023-01-11 RX ADMIN — ASPIRIN 81 MG: 81 TABLET, CHEWABLE ORAL at 20:04

## 2023-01-11 RX ADMIN — FLUOXETINE 10 MG: 10 CAPSULE ORAL at 09:07

## 2023-01-11 RX ADMIN — DICLOFENAC SODIUM 4 G: 10 GEL TOPICAL at 20:04

## 2023-01-11 RX ADMIN — ACETAMINOPHEN 975 MG: 325 TABLET, FILM COATED ORAL at 21:37

## 2023-01-11 RX ADMIN — HYDROMORPHONE HYDROCHLORIDE 0.5 MG: 1 INJECTION, SOLUTION INTRAMUSCULAR; INTRAVENOUS; SUBCUTANEOUS at 16:51

## 2023-01-11 RX ADMIN — DOXYCYCLINE HYCLATE 100 MG: 100 CAPSULE ORAL at 09:06

## 2023-01-11 RX ADMIN — DICLOFENAC SODIUM 4 G: 10 GEL TOPICAL at 09:00

## 2023-01-11 RX ADMIN — DOXYCYCLINE HYCLATE 100 MG: 100 CAPSULE ORAL at 20:04

## 2023-01-11 RX ADMIN — DICLOFENAC SODIUM 4 G: 10 GEL TOPICAL at 16:51

## 2023-01-11 RX ADMIN — OXYCODONE HYDROCHLORIDE 15 MG: 10 TABLET ORAL at 06:27

## 2023-01-11 RX ADMIN — DICLOFENAC SODIUM 4 G: 10 GEL TOPICAL at 13:49

## 2023-01-11 RX ADMIN — ALBUTEROL SULFATE 2.5 MG: 2.5 SOLUTION RESPIRATORY (INHALATION) at 09:40

## 2023-01-11 RX ADMIN — HYDROXYUREA 3000 MG: 500 CAPSULE ORAL at 09:01

## 2023-01-11 RX ADMIN — FOLIC ACID 1 MG: 1 TABLET ORAL at 09:10

## 2023-01-11 RX ADMIN — FLUTICASONE FUROATE AND VILANTEROL TRIFENATATE 1 PUFF: 200; 25 POWDER RESPIRATORY (INHALATION) at 09:00

## 2023-01-11 RX ADMIN — CEFTRIAXONE SODIUM 1 G: 1 INJECTION, POWDER, FOR SOLUTION INTRAMUSCULAR; INTRAVENOUS at 11:56

## 2023-01-11 RX ADMIN — OXYCODONE HYDROCHLORIDE 15 MG: 10 TABLET ORAL at 01:06

## 2023-01-11 RX ADMIN — FUROSEMIDE 20 MG: 10 INJECTION, SOLUTION INTRAVENOUS at 11:39

## 2023-01-11 RX ADMIN — HYDROMORPHONE HYDROCHLORIDE 0.5 MG: 1 INJECTION, SOLUTION INTRAMUSCULAR; INTRAVENOUS; SUBCUTANEOUS at 04:40

## 2023-01-11 RX ADMIN — KETAMINE HYDROCHLORIDE 6 MG/HR: 100 INJECTION, SOLUTION, CONCENTRATE INTRAMUSCULAR; INTRAVENOUS at 13:50

## 2023-01-11 RX ADMIN — HYDROMORPHONE HYDROCHLORIDE 0.5 MG: 1 INJECTION, SOLUTION INTRAMUSCULAR; INTRAVENOUS; SUBCUTANEOUS at 12:50

## 2023-01-11 RX ADMIN — HYDROMORPHONE HYDROCHLORIDE 0.5 MG: 1 INJECTION, SOLUTION INTRAMUSCULAR; INTRAVENOUS; SUBCUTANEOUS at 08:56

## 2023-01-11 RX ADMIN — HYDROMORPHONE HYDROCHLORIDE 0.5 MG: 1 INJECTION, SOLUTION INTRAMUSCULAR; INTRAVENOUS; SUBCUTANEOUS at 21:37

## 2023-01-11 RX ADMIN — ASPIRIN 81 MG: 81 TABLET, CHEWABLE ORAL at 09:09

## 2023-01-11 ASSESSMENT — ACTIVITIES OF DAILY LIVING (ADL)
ADLS_ACUITY_SCORE: 21

## 2023-01-11 NOTE — PLAN OF CARE
Reason for Admission: Acute chest pain and possible acute chest syndrome with sickle cell crisis    RN assumed cares at ; 0700 hrs- 1930 hrs    Vitals: WDL  Pain: Complain of pain through out the shift, with some minimal relief following . administration of pain meds. IV dilaudid administered at 4.51 pm  Neuro: Alert and awake, well oriented x 4  Cardiac: WDL  Respiratory: Has been on 02 through out the shift via oxy mask at 4 l/m. Was nebulized once, spo2 92%  GI/:  Passed urine and emptied bowel  IV/Drains: has Port a cath left chest and a PIV line left. Ketamine gtts at 6 mgl/hr on progress  Activity: Up to the commode and back on bed unassisted  Skin: intact      Plan of Care:  CT pain management.      Plan of Care Reviewed With: patient    Overall Patient Progress: no change.

## 2023-01-11 NOTE — PROVIDER NOTIFICATION
Provider notified (name): MAGNO THORPE  Reason for notification: 5B  8334 TD: FYI patient is sating at 86-89% on 4L O2. thank you Ayse DIAZ [id number]  Recommendation/request given to provider:  Response from provider:

## 2023-01-11 NOTE — PLAN OF CARE
Time of care: 2300-0730     22 y.o. female admitted with sickle cell crisis. Hx chronic PE. She has Right sided weakness and bilateral flank pain with a ketamine drip running at 6mg/h. She is on continuous O2 monitoring. Jing DE OLIVEIRA about O2 declining despite O2 delivery increase to 5L/min. M.D. assessed pt. I decreased the O2 to 4L/min and determined her O2 remained stable at that rate. Sat currently 91-95%. The plan is to wean off and keep O2 above 92. Vsq8, no BG, reg diet, SBA. L PIV TKO. Oxy and dilaudid prescribed PRN Toradol on a schedule. Last hemoglobin was 6.3 yesterday morning at 0642. She is alert and oriented x4, able to make needs known, had a difficult time sleeping last night. Continue to monitor and adhere to POC.      Goal Outcome Evaluation: Ongoing, progressing     Plan of Care Reviewed With: patient     Overall Patient Progress: no change

## 2023-01-11 NOTE — PLAN OF CARE
"Goal Outcome Evaluation:  /74 (BP Location: Left arm)   Pulse 100   Temp 98.6  F (37  C) (Oral)   Resp 20   Ht 1.626 m (5' 4.02\")   Wt 72.4 kg (159 lb 9.5 oz)   SpO2 98%   BMI 27.38 kg/m      Care from: [id number]    Reason for Admission: Acute chest syndrome/Sickle Cell Crisis.     Slightly tachycardic but otherwise vitally stable on 2L oxymask. Given tramadol, IV dilaudid and diclofenac gel for 9/10 right flank and chest pain. Currently sitting in chair. Up with SBA. Ketamine drip at 6mg/hr. Continuous O2 monitor on. Some c/o abdominal discomfort. Port and PIV. Right sided deficits from prior infarcts. Voiding adequately via commode, no BM.     Plan: Continue with pain management and plan of care             Plan of Care Reviewed With: patient     Overall Patient Progress: no change           "

## 2023-01-11 NOTE — PROGRESS NOTES
Called to see patient for c/o increasing O2 needs.   Was on 2L at around 11pm on 1/10 per RN and has gradually increased to 5-6L to keep O2 sat >92%  On interview, pt c/o no chest pain/ no dyspnea. She has no pain with inspiration  Patient is unable to participate in a full exam due to pain. Anterior chest auscaltation with no crackles/wheezes, S1,S2 heard, no rmt. No pedal edema  Plan  - Stat CXR  - Will discuss with hematology re transfusion    Due to escalating O2 needs, asked RN to hold off on giving IV Dilaudid until her O2 needs stabilize. Patient was quite upset by this and asked me to leave the room.   Spoke to hematology who advise continued monitoring at this time given no clinical decompensation & CXR w no major changes  - We were then able to wean her to 4L with O2 sats 94%. IV dilaudid was given    Addendum: At 6:30am on follow-up, patient was still on 4L at 94% . Continue to monitor clinically

## 2023-01-11 NOTE — PROVIDER NOTIFICATION
Provider notified (name): Dr. Hinojosa  Reason for notification: Hgb 6.5 this morning   Recommendation/request given to provider: Shasha KIMBROUGH slightly better than yesterday  Response from provider:

## 2023-01-11 NOTE — PROGRESS NOTES
Brief Heme/Onc Note:    Called regarding patient by covering hospitalist Dr. Lees due to concern of increased O2 requirement overnight. She is reportedly on 5-6L of O2 (O2 req up from 1-2L during the day). No significant change in vitals signs, stable HR, RR and BP. No fevers. No reported visible respiratory distress or tachypnea per provider, patient did not allow them to perform respiratory exam. Patient still reporting breakthrough pain despite ketamine gtt, they are considering one time dose of bolus hydromorphone.     AM labs still awaited, labs from yesterday relatively stable with Hbg 6.7 (from 7.4 at time of admission 3d ago), improving leucocytosis, stable bilirubin (2.9, was 3.0 2d ago and reticulocyte count. Had a V/P scan previously during hospitalization - 1/8/23 - negative for PE. CXR had shown streaky infiltrates in bilateral lung bases. Had O2 requirement up to 8L previously during hospitalization, reportedly improved after improved pain control.     With above clinical information (no fevers or worsening leucocytosis/hemolytic labs or significant change in respiratory status but with possibly increased hypoxia), while differential does include acute chest syndrome (ACS) - do not see indication for a simple or exchange transfusion at this time. Agree with obtaining stat CXR and reviewing AM labs, optimizing pain control. Please have an uptodate type-and-screen (<72h) in case.     Discussed recs with Dr. Lees.     David Lara   PGY-5 Fellow, Hematology,Oncology and BMT

## 2023-01-11 NOTE — PLAN OF CARE
Goal Outcome Evaluation: Ongoing; Not progressing      Plan of Care Reviewed With: patient    Overall Patient Progress: no change    Outcome Evaluation: Pt with increased pain this shift, ketamine gtt increased this shift, dilauded reintroduced for pain management    Reason for Admission: Acute chest syndrome/Sickle Cell Crisis.     Status: No change    RN assumed cares at 0700    Vitals: WDL  Pain: Increased pain this shift in the right flank and chest.  Ketamine gtt increased this evening and IV dilaudid reintroduced.   Neuro: Some right sided deficits from prior infarcts.  RUE contracture of hand.    Cardiac: Some chest pain, tachycardia  Respiratory: Pt on 2-3 L oximask, desats without supplemental oxygen and with exertion.   GI/: Voiding adequately, No BM this shift.   IV/Drains: PIV and chest port.    Activity: Up with stand by assist for line management   Skin: WDL  Labs: WDL    Plan of Care:  RN assumed cares at 0700, Pt alert and oriented, VS stable throughout the shift. Pt has PIV infusing ketamine at 6mg/hr with saline TKO.  Port infusing TKO between abx.  Pt reports increased pain this shift in the flank and chest.  Ketamine gtt increased this evening; IV dilaudid reintroduced.  Pt up with stand by assist for line management.  Plan to return to prior living once pain well managed on oral medications and patient medically stable.  No other acute incidents this shift.  Continue to monitor and notify MD of any changes.

## 2023-01-11 NOTE — PROGRESS NOTES
[facility]    Medicine Progress Note - Hospitalist Service, GOLD TEAM 11    Date of Admission:  1/8/2023    Assessment & Plan      Jennifer Cervantes is a 22 year old female with a past medical history including Hb-SS disease, sickle cell pain crises, chronic PE without acute cor pulmonale, acute chest syndrome, and right sided hemiplegia and hemiparesis following remote cerebral infarction now admitted for management of acute chest wall pain and possible acute chest syndrome.     #AcuteSickle Cell Vaso-occlusive Pain Crisis  #Acute chest: Mild   #Hx Sickle Cell (SS) with hx CVA, acute chest pulm htn, chronic PE  Presented with acute onset chest wall pain consistent with previous vasoocclusive pain. Home pain management with oxycodone 15mg q4h PRN, has been taking more or less scheduled recently due to pain being worse in winter.  Unable to have acute pain infusion appt Friday 1/6.  Care plan in place followed in ED, will initiate inpatient care per pain plan.  Discussed with hematology, severe pain when evaluated, reasonable to give IV pain control consistent with outpatient infusion plan, then transition to inpatient plan with oral oxy, ketamine, ASA. Ketorolac has also been helpful, will continue. Regarding acute chest, hypoxia with white count and streaky atelectasis in ED, neg VQ scan, breathing comfortably with non-focal exam, no tachypnea or increased work of breathing, denies respiratory symptoms on my exam; persistent O2 demand, slowly weaning.  - Pain mgmt per inpatient care plan:    ketamine gtt at 6mg/hr(previously felt bad at 8mg/hr)    Opiates:Oxycodone 15mg q4h PRN, Dilaudid 1mg q4h prn   Voltaren gel PRN   Continue PTA ASA   - s/p Ketorolac 30mg q6h  - Senna/docusate while on opiates  - Acute chest syndrome, mild:    - VQ scan negative on admission.    - Tx: IS q1h while awake, encourage ambulation   - Abx: ceftriaxone/doxycycline   - Continue PTA  "hydroxyuria  - Hold deferasirox pending heme    #Hyponatremia- resolved:   - Poor PO intake, corrected s/p fluid resuscitation     #Hx of CVA: continue PTA ASA BID  #Hx asthma: cont PTA budesonide/formoterol, PRN albuterol  #Depression: cont PTA prozac, health psych consulted.          Diet: Regular Diet Adult    DVT Prophylaxis: BID ASA  Lopez Catheter: Not present  Lines: PRESENT      Port A Cath Single 04/21/21 Left Chest wall-Site Assessment: WDL      Cardiac Monitoring: None  Code Status: Full Code      Clinically Significant Risk Factors          # Hypocalcemia: Lowest Ca = 8.4 mg/dL in last 2 days, will monitor and replace as appropriate               # Overweight: Estimated body mass index is 27.38 kg/m  as calculated from the following:    Height as of this encounter: 1.626 m (5' 4.02\").    Weight as of this encounter: 72.4 kg (159 lb 9.5 oz)., PRESENT ON ADMISSION         Disposition Plan      Expected Discharge Date: 01/11/2023,  3:00 PM      Discharge Comments: Disposition: Home with no new needs  Delay: O2  Progress: IVF, pain mgmt, CMA needed          Zaynab Madison MD  Hospitalist Service, GOLD TEAM 11  [facility]  Securely message with trinket (more info)  Text page via [facility] Paging/Directory   See signed in provider for up to date coverage information  ______________________________________________________________________    Interval History   Worsening pain with increased O2 requirements overnight (see Overnight Hospitalist/Hematology documentation for full details). CXR with pulmonary edema and atelectasis. Patient already on CTX/Azithromycin. Discussed w/ hematology about low Hgb and CXR findings, who recommended holding on RBC transfusion given good retic count but trying 1x IV lasix dose to see if this helps with the patient's breathing. Pain this AM much improved w/o needing escalation of pain regimen which was started yesterday (pain still in " R.flank/side area), along w/ patient now on RA breathing overall comfortably, but was wanting a breathing treatment to help her breathing.     Physical Exam   Vital Signs: Temp: 98.7  F (37.1  C) Temp src: Oral BP: 124/69 Pulse: 100   Resp: 18 SpO2: 93 % O2 Device: Oxymask Oxygen Delivery: 4 LPM  Weight: 159 lbs 9.46 oz    General: alert & oriented, laying in bed bundled in covers, MMM, EOMI w/o scleral icterus, neck FROM, pleasant   Resp: CTAB w/o focal findings w/ slightly decreased aeration in blt lung bases (R>L), no increased WOB, on NC  Cards: RRR w/o murmurs/rubs/gallops, no JVD, no LE edema  GI: soft, pain in R.flank area  Skin: warm and well perfused  Neuro/MSK:  moving all 4 extremities equally, ambulating up to bathroom    Medical Decision Making       50 MINUTES SPENT BY ME on the date of service doing chart review, history, exam, documentation & further activities per the note.      Data     I have personally reviewed the following data over the past 24 hrs:    13.4 (H)  \   6.5 (LL)   / 350     135 (L) 104 5.6 (L) /  85   4.0 18 (L) 0.47 (L) \       ALT: 17 AST: 39 (H) AP: 69 TBILI: 2.8 (H)   ALB: 3.8 TOT PROTEIN: 6.7 LIPASE: N/A       Ferritin:  N/A % Retic:  19.3 (H) LDH:  N/A       Imaging results reviewed over the past 24 hrs:   Recent Results (from the past 24 hour(s))   XR Chest Port 1 View    Narrative    EXAM: XR CHEST PORT 1 VIEW  1/11/2023 4:25 AM      HISTORY: Acute onset hypoxia    COMPARISON: X-ray 1/8/2022    FINDINGS: Single view of the chest. Left IJ central venous catheter  with tip overlying the high right atrium. No pneumothorax. Small  bilateral pleural effusions. Normal cardiac silhouette. Left lower  lobe interstitial streakiness. Perihilar and lower lobe hazy opacity.  Visualized upper abdomen and bones are grossly unremarkable.      Impression    IMPRESSION:  1. Pulmonary edema with bilateral pleural effusions.  2. Focal opacity at the left lung base likely atelectasis.  Infection  not excluded.    I have personally reviewed the examination and initial interpretation  and I agree with the findings.    BRENDA CURTIS MD         SYSTEM ID:  [id number]

## 2023-01-12 ENCOUNTER — APPOINTMENT (OUTPATIENT)
Dept: GENERAL RADIOLOGY | Facility: CLINIC | Age: 23
DRG: 811 | End: 2023-01-12
Attending: INTERNAL MEDICINE
Payer: COMMERCIAL

## 2023-01-12 DIAGNOSIS — D57.00 SICKLE CELL PAIN CRISIS (H): ICD-10-CM

## 2023-01-12 LAB
ALBUMIN SERPL BCG-MCNC: 3.8 G/DL (ref 3.5–5.2)
ALP SERPL-CCNC: 69 U/L (ref 35–104)
ALT SERPL W P-5'-P-CCNC: 14 U/L (ref 10–35)
ANION GAP SERPL CALCULATED.3IONS-SCNC: 13 MMOL/L (ref 7–15)
AST SERPL W P-5'-P-CCNC: 38 U/L (ref 10–35)
BILIRUB SERPL-MCNC: 2.2 MG/DL
BUN SERPL-MCNC: 5.6 MG/DL (ref 6–20)
CALCIUM SERPL-MCNC: 8.9 MG/DL (ref 8.6–10)
CHLORIDE SERPL-SCNC: 105 MMOL/L (ref 98–107)
CREAT SERPL-MCNC: 0.47 MG/DL (ref 0.51–0.95)
DEPRECATED HCO3 PLAS-SCNC: 20 MMOL/L (ref 22–29)
ERYTHROCYTE [DISTWIDTH] IN BLOOD BY AUTOMATED COUNT: 21.2 % (ref 10–15)
GFR SERPL CREATININE-BSD FRML MDRD: >90 ML/MIN/1.73M2
GLUCOSE SERPL-MCNC: 84 MG/DL (ref 70–99)
HCT VFR BLD AUTO: 17.2 % (ref 35–47)
HGB BLD-MCNC: 6.2 G/DL (ref 11.7–15.7)
MCH RBC QN AUTO: 30.8 PG (ref 26.5–33)
MCHC RBC AUTO-ENTMCNC: 36 G/DL (ref 31.5–36.5)
MCV RBC AUTO: 86 FL (ref 78–100)
PLATELET # BLD AUTO: 324 10E3/UL (ref 150–450)
POTASSIUM SERPL-SCNC: 3.8 MMOL/L (ref 3.4–5.3)
PROT SERPL-MCNC: 6.8 G/DL (ref 6.4–8.3)
RBC # BLD AUTO: 2.01 10E6/UL (ref 3.8–5.2)
RETICS # AUTO: 0.32 10E6/UL (ref 0.03–0.1)
RETICS/RBC NFR AUTO: 16.3 % (ref 0.5–2)
SODIUM SERPL-SCNC: 138 MMOL/L (ref 136–145)
WBC # BLD AUTO: 12.8 10E3/UL (ref 4–11)

## 2023-01-12 PROCEDURE — 99232 SBSQ HOSP IP/OBS MODERATE 35: CPT | Performed by: PHYSICIAN ASSISTANT

## 2023-01-12 PROCEDURE — 120N000002 HC R&B MED SURG/OB UMMC

## 2023-01-12 PROCEDURE — 250N000013 HC RX MED GY IP 250 OP 250 PS 637: Performed by: PEDIATRICS

## 2023-01-12 PROCEDURE — 71045 X-RAY EXAM CHEST 1 VIEW: CPT

## 2023-01-12 PROCEDURE — 250N000011 HC RX IP 250 OP 636: Performed by: INTERNAL MEDICINE

## 2023-01-12 PROCEDURE — 85027 COMPLETE CBC AUTOMATED: CPT | Performed by: PEDIATRICS

## 2023-01-12 PROCEDURE — 93010 ELECTROCARDIOGRAM REPORT: CPT | Performed by: INTERNAL MEDICINE

## 2023-01-12 PROCEDURE — 93005 ELECTROCARDIOGRAM TRACING: CPT

## 2023-01-12 PROCEDURE — 999N000157 HC STATISTIC RCP TIME EA 10 MIN

## 2023-01-12 PROCEDURE — 99233 SBSQ HOSP IP/OBS HIGH 50: CPT | Performed by: INTERNAL MEDICINE

## 2023-01-12 PROCEDURE — 250N000013 HC RX MED GY IP 250 OP 250 PS 637: Performed by: STUDENT IN AN ORGANIZED HEALTH CARE EDUCATION/TRAINING PROGRAM

## 2023-01-12 PROCEDURE — 250N000009 HC RX 250: Performed by: INTERNAL MEDICINE

## 2023-01-12 PROCEDURE — 80053 COMPREHEN METABOLIC PANEL: CPT | Performed by: STUDENT IN AN ORGANIZED HEALTH CARE EDUCATION/TRAINING PROGRAM

## 2023-01-12 PROCEDURE — 94640 AIRWAY INHALATION TREATMENT: CPT | Mod: 76

## 2023-01-12 PROCEDURE — 250N000009 HC RX 250: Performed by: STUDENT IN AN ORGANIZED HEALTH CARE EDUCATION/TRAINING PROGRAM

## 2023-01-12 PROCEDURE — 250N000013 HC RX MED GY IP 250 OP 250 PS 637: Performed by: INTERNAL MEDICINE

## 2023-01-12 PROCEDURE — 258N000003 HC RX IP 258 OP 636: Performed by: INTERNAL MEDICINE

## 2023-01-12 PROCEDURE — 85045 AUTOMATED RETICULOCYTE COUNT: CPT | Performed by: PEDIATRICS

## 2023-01-12 PROCEDURE — 94640 AIRWAY INHALATION TREATMENT: CPT

## 2023-01-12 PROCEDURE — 36591 DRAW BLOOD OFF VENOUS DEVICE: CPT | Performed by: PEDIATRICS

## 2023-01-12 PROCEDURE — 71045 X-RAY EXAM CHEST 1 VIEW: CPT | Mod: 26 | Performed by: RADIOLOGY

## 2023-01-12 RX ORDER — OXYCODONE HYDROCHLORIDE 15 MG/1
15 TABLET ORAL EVERY 4 HOURS PRN
Qty: 40 TABLET | Refills: 0 | Status: SHIPPED | OUTPATIENT
Start: 2023-01-12 | End: 2023-01-16

## 2023-01-12 RX ORDER — FUROSEMIDE 10 MG/ML
20 INJECTION INTRAMUSCULAR; INTRAVENOUS ONCE
Status: COMPLETED | OUTPATIENT
Start: 2023-01-12 | End: 2023-01-12

## 2023-01-12 RX ORDER — FUROSEMIDE 10 MG/ML
20 INJECTION INTRAMUSCULAR; INTRAVENOUS ONCE
Status: DISCONTINUED | OUTPATIENT
Start: 2023-01-12 | End: 2023-01-12

## 2023-01-12 RX ORDER — OXYCODONE HYDROCHLORIDE 10 MG/1
20 TABLET ORAL EVERY 4 HOURS PRN
Status: DISCONTINUED | OUTPATIENT
Start: 2023-01-12 | End: 2023-01-13

## 2023-01-12 RX ORDER — METHOCARBAMOL 500 MG/1
500 TABLET, FILM COATED ORAL 3 TIMES DAILY
Status: DISCONTINUED | OUTPATIENT
Start: 2023-01-12 | End: 2023-01-16 | Stop reason: HOSPADM

## 2023-01-12 RX ORDER — CEFTRIAXONE 1 G/1
1 INJECTION, POWDER, FOR SOLUTION INTRAMUSCULAR; INTRAVENOUS EVERY 24 HOURS
Status: COMPLETED | OUTPATIENT
Start: 2023-01-12 | End: 2023-01-13

## 2023-01-12 RX ORDER — DOXYCYCLINE 100 MG/1
100 CAPSULE ORAL EVERY 12 HOURS SCHEDULED
Status: COMPLETED | OUTPATIENT
Start: 2023-01-12 | End: 2023-01-13

## 2023-01-12 RX ORDER — ALBUTEROL SULFATE 0.83 MG/ML
2.5 SOLUTION RESPIRATORY (INHALATION) ONCE
Status: DISCONTINUED | OUTPATIENT
Start: 2023-01-12 | End: 2023-01-12

## 2023-01-12 RX ADMIN — SENNOSIDES AND DOCUSATE SODIUM 2 TABLET: 8.6; 5 TABLET ORAL at 19:35

## 2023-01-12 RX ADMIN — METHOCARBAMOL 500 MG: 500 TABLET ORAL at 19:36

## 2023-01-12 RX ADMIN — FLUOXETINE 10 MG: 10 CAPSULE ORAL at 10:39

## 2023-01-12 RX ADMIN — KETAMINE HYDROCHLORIDE 6 MG/HR: 100 INJECTION, SOLUTION, CONCENTRATE INTRAMUSCULAR; INTRAVENOUS at 08:59

## 2023-01-12 RX ADMIN — FLUTICASONE FUROATE AND VILANTEROL TRIFENATATE 1 PUFF: 200; 25 POWDER RESPIRATORY (INHALATION) at 10:41

## 2023-01-12 RX ADMIN — POLYETHYLENE GLYCOL 3350 17 G: 17 POWDER, FOR SOLUTION ORAL at 10:41

## 2023-01-12 RX ADMIN — SENNOSIDES AND DOCUSATE SODIUM 2 TABLET: 8.6; 5 TABLET ORAL at 10:39

## 2023-01-12 RX ADMIN — DICLOFENAC SODIUM 4 G: 10 GEL TOPICAL at 19:36

## 2023-01-12 RX ADMIN — TRAZODONE HYDROCHLORIDE 50 MG: 50 TABLET ORAL at 22:30

## 2023-01-12 RX ADMIN — HYDROMORPHONE HYDROCHLORIDE 0.5 MG: 1 INJECTION, SOLUTION INTRAMUSCULAR; INTRAVENOUS; SUBCUTANEOUS at 21:30

## 2023-01-12 RX ADMIN — DICLOFENAC SODIUM 4 G: 10 GEL TOPICAL at 12:20

## 2023-01-12 RX ADMIN — HYDROMORPHONE HYDROCHLORIDE 0.5 MG: 1 INJECTION, SOLUTION INTRAMUSCULAR; INTRAVENOUS; SUBCUTANEOUS at 10:02

## 2023-01-12 RX ADMIN — DOXYCYCLINE HYCLATE 100 MG: 100 CAPSULE ORAL at 19:36

## 2023-01-12 RX ADMIN — ALBUTEROL SULFATE 2.5 MG: 2.5 SOLUTION RESPIRATORY (INHALATION) at 16:05

## 2023-01-12 RX ADMIN — ALBUTEROL SULFATE 5 MG: 2.5 SOLUTION RESPIRATORY (INHALATION) at 12:39

## 2023-01-12 RX ADMIN — FUROSEMIDE 20 MG: 10 INJECTION, SOLUTION INTRAVENOUS at 17:20

## 2023-01-12 RX ADMIN — HYDROXYUREA 3000 MG: 500 CAPSULE ORAL at 10:42

## 2023-01-12 RX ADMIN — ASPIRIN 81 MG: 81 TABLET, CHEWABLE ORAL at 10:39

## 2023-01-12 RX ADMIN — HYDROMORPHONE HYDROCHLORIDE 0.5 MG: 1 INJECTION, SOLUTION INTRAMUSCULAR; INTRAVENOUS; SUBCUTANEOUS at 06:17

## 2023-01-12 RX ADMIN — HYDROMORPHONE HYDROCHLORIDE 0.5 MG: 1 INJECTION, SOLUTION INTRAMUSCULAR; INTRAVENOUS; SUBCUTANEOUS at 18:27

## 2023-01-12 RX ADMIN — ASPIRIN 81 MG: 81 TABLET, CHEWABLE ORAL at 19:36

## 2023-01-12 RX ADMIN — FOLIC ACID 1 MG: 1 TABLET ORAL at 10:39

## 2023-01-12 RX ADMIN — FUROSEMIDE 20 MG: 10 INJECTION, SOLUTION INTRAVENOUS at 13:36

## 2023-01-12 RX ADMIN — CEFTRIAXONE SODIUM 1 G: 1 INJECTION, POWDER, FOR SOLUTION INTRAMUSCULAR; INTRAVENOUS at 12:18

## 2023-01-12 RX ADMIN — HYDROMORPHONE HYDROCHLORIDE 0.5 MG: 1 INJECTION, SOLUTION INTRAMUSCULAR; INTRAVENOUS; SUBCUTANEOUS at 14:30

## 2023-01-12 RX ADMIN — HYDROMORPHONE HYDROCHLORIDE 0.5 MG: 1 INJECTION, SOLUTION INTRAMUSCULAR; INTRAVENOUS; SUBCUTANEOUS at 02:10

## 2023-01-12 RX ADMIN — METHOCARBAMOL 500 MG: 500 TABLET ORAL at 13:36

## 2023-01-12 RX ADMIN — DOXYCYCLINE HYCLATE 100 MG: 100 CAPSULE ORAL at 10:39

## 2023-01-12 ASSESSMENT — ACTIVITIES OF DAILY LIVING (ADL)
ADLS_ACUITY_SCORE: 21
ADLS_ACUITY_SCORE: 21
ADLS_ACUITY_SCORE: 22
ADLS_ACUITY_SCORE: 21
ADLS_ACUITY_SCORE: 22
ADLS_ACUITY_SCORE: 21

## 2023-01-12 NOTE — PROVIDER NOTIFICATION
01/12/23 1500   Call Information   Date of Call 01/12/23   Time of Call 1409   Name of person requesting the team Maureen OTTO   Title of person requesting team RN   RRT Arrival time 1415   Time RRT ended 1430   Reason for call   Type of RRT Adult   Primary reason for call Respiratory   Respiratory Other (describe)  (increasing O2 requirements)   Was patient transferred from the ED, ICU, or PACU within last 24 hours prior to RRT call? No   SBAR   Situation Requiring 5L NC to maintain O2 sats >90%   Background Per H&P, PMH: Hb-SS disease, sickle cell pain crises, chronic PE without acute cor pulmonale, acute chest syndrome, and right sided hemiplegia and hemiparesis following remote cerebral infarction now admitted for management of acute chest wall pain and possible acute chest syndrome   Notable History/Conditions Neurological  (Sickle Cell, CVA, PE)   Assessment A&Ox4, sitting up in bed watching videos on her phone. ST (110-120s), and now requiring 5L NC. Coarse breathn sounds but no appearance of labored breathing and no c/o SOB. No c/o pain, n/v/d.   Interventions CXR;ECG;Labs;O2 per N/C or mask;Other (describe)  (V/Q scan)   Patient Outcome   Patient Outcome Stabilized on unit   RRT Team   Attending/Primary/Covering Physician Gold 11   Date Attending Physician notified 01/12/23   Time Attending Physician notified 1519   Physician(s) RONALDO Wise   Lead RN Jenn OTTO   RT Izabel   Post RRT Intervention Assessment   Post RRT Assessment Stable/Improved   Date Follow Up Done 01/12/23   Time Follow Up Done 1727   Comments Weaned to 5L oxymask (from 8L). Sitting up in the chair, no c/o SOB or pain

## 2023-01-12 NOTE — PLAN OF CARE
"/54 (BP Location: Left arm)   Pulse 105   Temp 98.4  F (36.9  C) (Oral)   Resp 18   Ht 1.626 m (5' 4.02\")   Wt 75.7 kg (166 lb 12.8 oz)   SpO2 95%   BMI 28.62 kg/m      RN assumed cares: [id number]  Reason for Admission: Acute chest pain and possible acute chest syndrome with sickle cell crisis  Vitals: WDL  Pain: Complain of pain through out the shift, with some minimal relief following . Pain manage with PRN  IV dilaudid, oxycodone and continuous Ketamine gtt.   Neuro: Alert and awake, well oriented x 4  Cardiac: WDL  Respiratory: Has been on 02 through out the shift via oxy mask at 4 l/m.   GI/: voiding without difficulty, no BM this shift.  IV/Drains: has Port a cath left chest and a PIV line left. Ketamine gtts at 6 mgl/hr continuous  Activity: Not OOB this shift  Skin: intact  Plan of Care: Continue pain management.            .           "

## 2023-01-12 NOTE — TELEPHONE ENCOUNTER
Refill Request    Date of most recent appointment:  In hospital on 1/8 to present for hypoxia, R chest pain, antibiotics d/t CXR findings of perihilar and bibasilar opacities, and PE work up  Next upcoming appointment:   Not yet scheduled  Prescribing provider(s):  1/4/23 Dr. Duncan  Person requesting refill:  Jennifer    Medication requested:  Oxycodone IR 15 MG tablet  Quantity:  40  Last fill date:  1/4/23    Notes:  Pt is in the hospital currently. Does not know if she will be discharged today.  Pain being treated sickle cell pain  When will pt be out of meds: this weekend  Side effects: No  Number of times pt takes per day: Pt takes 15 mg q4h  Other Hoping to be discharged today; Pt states she can  after discharge.    Not  reviewed.    Pended and routed to Care Team: Dr. Duncan, Patricia Mantilla, JOCELYN and Arely Krueger, JOECC

## 2023-01-12 NOTE — PROGRESS NOTES
[facility]    Medicine Progress Note - Hospitalist Service, GOLD TEAM 11    Date of Admission:  1/8/2023    Assessment & Plan   Jennifer Cervantes is a 22 year old female with a past medical history including Hb-SS disease, sickle cell pain crises, chronic PE without acute cor pulmonale, acute chest syndrome, and right sided hemiplegia and hemiparesis following remote cerebral infarction now admitted for management of acute chest wall pain and possible acute chest syndrome.     # Acute Hypoxic Respiratory Failure- Asymptomatic  # Acute Chest Syndrome  Hypoxia gradually worsening with need for increasing supplement O2. Patient remains asymptomatic during this episodes, except for tachycardia to 110s. CXR w/ increasing pulmonary edema and atelectasis. Patient w/o cough/congestion/fevers/other sick symptoms.    - supplemental O2 w/ goal sats >90%  - Diuresis: Lasix IV 20mg spot dose (2x today)  - Abx: ceftriaxone/doxycycline   - breathing treatment: prn albuterol  - IS q1h (patient to set timed alarm for reminder while awake), encourage out of bed/ambulation  - Hgb this AM 6.2. Discussed w/ hematology who recommended against transfusion at this time   [ ] EKG pending  [ ] repeat V/Q scan pending (prior V/Q 1/8 negative for PE)    #AcuteSickle Cell Vaso-occlusive Pain Crisis  #Acute chest: Mild   #Hx Sickle Cell (SS) with hx CVA, acute chest pulm htn, chronic PE  Presented with acute onset chest wall pain consistent with previous vasoocclusive pain. Home pain management with oxycodone 15mg q4h PRN, has been taking more or less scheduled recently due to pain being worse in winter.  Unable to have acute pain infusion appt Friday 1/6.  Care plan in place followed in ED, will initiate inpatient care per pain plan.  Discussed with hematology, severe pain when evaluated, reasonable to give IV pain control consistent with outpatient infusion plan, then transition to inpatient plan with  "oral oxy, ketamine, ASA. Ketorolac has also been helpful, will continue. Regarding acute chest, hypoxia with white count and streaky atelectasis in ED, neg VQ scan, breathing comfortably with non-focal exam, no tachypnea or increased work of breathing, denies respiratory symptoms on my exam; persistent O2 demand, slowly weaning.  - Pain mgmt per inpatient care plan:    - discontinued:ketamine gtt at 6mg/hr(previously felt bad at 8mg/hr)    - Opiates: increased Oxycodone 20mg q4h PRN, Dilaudid 1mg q4h prn   - Voltaren gel PRN, Robaxin 500mg TID   - Continue PTA ASA   - s/p Ketorolac 30mg q6h  - Bowel regimen: Senna/docusate and miralax  - Continue PTA hydroxyuria and folic acid  - Hold deferasirox pending heme    #Hyponatremia- resolved:   - Poor PO intake, corrected s/p fluid resuscitation     #Hx of CVA: continue PTA ASA BID  #Hx asthma: cont PTA budesonide/formoterol, PRN albuterol  #Depression: cont PTA prozac, health psych consulted.        Diet: Regular Diet Adult    DVT Prophylaxis: ASA BID  Lopez Catheter: Not present  Lines: PRESENT      Port A Cath Single 04/21/21 Left Chest wall-Site Assessment: WDL      Cardiac Monitoring: None  Code Status: Full Code      Clinically Significant Risk Factors                        # Overweight: Estimated body mass index is 28.62 kg/m  as calculated from the following:    Height as of this encounter: 1.626 m (5' 4.02\").    Weight as of this encounter: 75.7 kg (166 lb 12.8 oz)., PRESENT ON ADMISSION         Disposition Plan      Expected Discharge Date: 01/13/2023,  3:00 PM    Destination: home with family  Discharge Comments: Disposition: Home with no new needs  Delay: O2 and IV pain  Progress: IVF, pain mgmt, CMA needed          Zaynab Madison MD  Hospitalist Service, GOLD TEAM 11  [facility]  Securely message with [employer] (more info)  Text page via [facility] Paging/Directory   See signed in provider for up to date coverage " information  ______________________________________________________________________    Interval History   No acute events overnight. Patient this AM on 4L NC. Patient reporting still having pain in RUQ/flank area but reports improvement w/ goal to possibly discharge tomorrow. Patient agreeable to stopping Ketamine gtt and increasing oxycodone this AM to 20mg q4h prn and trying to avoid over use of dilaudid IV. Patient reporting she had good bowel movement last night and her appetite is starting to , eating small meals/snacks at a time.     Afternoon assessment:   Patient started having worsening O2sats requiring increasing supplemental O2. At bedside, patient in minimal distress. No signs of increased work of breathing and no chest pain. No wheezing on auscultation. Decreased aeration in blt lower lung fields. Discussed w/ Heme, recommended repeating IV lasix, holding on transfusing for Hgb <7 at this time. Patient given albuterol nebulizer.    Afternoon reassessment #1:   Patient w/ continued worsening O2sats and increasing supplemental O2. Sats hovering in mid 70s at times w/ patient reporting asymptomatic. HR did increase to 110s. Ordered CXR showing pulmonary edema and worsening atelectasis. Will repeat dose of lasix and continue to support patient's low O2 sats. Again discussed w/ Heme about possible transfusion but team hesitant due to risk of more volume overload/pulm edema.     Afternoon reassessment #2:   Patient dropped sats again requiring increasing supplemental O2. Ordered V/Q scan to r/o PE given patient currently only on ASA and hx of CVA. EKG not completed previously, will obtain now. Encouraged Incentive spirometry use q1h, patient to set timer q1h.     Physical Exam   Vital Signs: Temp: 98.3  F (36.8  C) Temp src: Oral BP: 110/45 Pulse: 97   Resp: 17 SpO2: 95 % O2 Device: Oxymask Oxygen Delivery: 4 LPM  Weight: 166 lbs 12.8 oz    General: alert & oriented, sitting up in bed, MMM, EOMI w/o  scleral icterus, neck FROM, pleasant   Resp: CTAB w/o focal findings w/ slightly decreased aeration in blt lung bases (R>L), no increased WOB, on NC  Cards: sinus tachycardia w/o murmurs/rubs/gallops, no JVD, no LE edema  GI: soft, pain in R.flank area (improved)  Skin: warm and well perfused  Neuro/MSK:  moving all 4 extremities equally, ambulating up to bathroom independantly    Medical Decision Making       50 MINUTES SPENT BY ME on the date of service doing chart review, history, exam, documentation & further activities per the note.      Data     I have personally reviewed the following data over the past 24 hrs:    12.8 (H)  \   6.2 (LL)   / 324     138 105 5.6 (L) /  84   3.8 20 (L) 0.47 (L) \       ALT: 14 AST: 38 (H) AP: 69 TBILI: 2.2 (H)   ALB: 3.8 TOT PROTEIN: 6.8 LIPASE: N/A       Ferritin:  N/A % Retic:  16.3 (H) LDH:  N/A       Imaging results reviewed over the past 24 hrs:   Recent Results (from the past 24 hour(s))   XR Chest Port 1 View    Narrative    EXAM:  XR CHEST PORT 1 VIEW    INDICATION: desats    COMPARISON:  Chest x-ray 1/11/2023    FINDINGS:  Single AP view of the chest. A left Port-A-Cath terminates over the  cavoatrial junction.    Cardiomediastinal silhouette within normal limits.  Low lung volumes  with silhouetting of left and right hemidiaphragm. Perihilar and  bibasilar opacities. No pneumothorax.   Loss of sharpening of the  costophrenic angle bilaterally.  Unremarkable upper abdomen. No acute  bony lesions.      Impression    IMPRESSION:  Decreased lung volumes with perihilar and increased bibasilar  opacities, likely edema/atelectasis. Possible trace pleural effusions.    I have personally reviewed the examination and initial interpretation  and I agree with the findings.    MARY JANE JUÁREZ MD         SYSTEM ID:  [id number]

## 2023-01-12 NOTE — PROGRESS NOTES
Hematology  Daily Progress Note   Date of Service: 01/12/2023    Patient: Jennifer Cervantes  MRN: [id number]  Admission Date: 1/8/2023  Hospital Day # Hospital Day: 5  Primary outpatient hematologist: Dr. 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 She has ongoing o2 requirement that is higher than admission with CXR showing pulmonary edema, s/p 1 dose of lasix.  At this time there is no indication for exchange or simple transfusion, will continue to monitor oxygen status, Hgb, and for any new symptoms of ACS and would recommend further diuresis for pulmonary edema.     Recommendations:     General Sickle Cell Management   - Monitor CBC w/ diff, retic count, bilirubin daily x several days then may space out to q 48 hours unless otherwise clinically indicated   - Please discuss transfusions with hematology first              -No transfusion indicated today but will need close monitoring of oxygen status  - Continue hydroxyurea 3000 mg daily   - Continue folic acid     Sickle cell pain  -frequent pain crises (acute and chronic components and maintained on chronic PO opioids and twice weekly infusion visits as outpatient)  -Recommend to discontinue IV pain medication today and rely on oxycodone and other adjuncts primarily (add robaxin and NSAID scheduled)     Right  chest wall pain  Hypoxia  Pulmonary edema   Possible CAP   - PE work-up negative (VQ scan 1/8)    -CXR 1/11 shows pulmonary edema - s/p 1 dose of lasix 20 mg IV 1/11 AM   - Agree with antibiotics for CAP per primary team   - Monitor O2 sats and vitals  - Encourage incentive spirometry q1h WA  - Encourage ambulation, consider PT consult  - Monitor for fluid overload and discontinue fluids if taking adequate po  - Please page hematology if patient develops fever, acute shortness of breath or other symptoms of acute chest syndrome.      Recurrent VTE/PE   Hx of stroke  - On Asa BID since 2/2022 due to failure of xarelto, apixaban, dabigatran, warfarin previously   - Continue asa BID       Plan of care was discussed with attending physician Dr. Villagran but patient was seen only by me     We will continue to follow this patient. Please don't hesitate to contact the Fellow On-Call with questions.    I spent 35 minutes face-to-face or coordinating care of Jennifer Cervantes.  Over 50% of our time on the unit was spent counseling the patient and/or coordinating care regarding acute chest syndrome, hypoxia with concern for acute chest syndrome     Cherelle Brock PA-C  [facility] Hematology  [phone]    ___________________________________________________________________  Subjective & Interval History:    No acute events noted overnight but continues to require o2.  She received 1 dose of lasix 20 mg IV yesterday AM.  This am she was on 2-4 LPM via oxymask with o2 sat 92-94%.  She states she feels fine except for pain in right ribs and pain in right ear x 3 days.  During our visit I had her take off her supplemental o2 and she desaturated to 80%, tachy to 110s.  She states she feels fine during this.  No chest pain, shortness of breath.  We tried to change her oximeter and finger that the pulse ox was on and it did not make a difference in o2 reading.  I placed her back on 5 LPM via oxymask and o2 sat returned to 94%.        Physical Exam:  "   /45 (BP Location: Left arm)   Pulse 97   Temp 98.3  F (36.8  C) (Oral)   Resp 17   Ht 1.626 m (5' 4.02\")   Wt 75.7 kg (166 lb 12.8 oz)   SpO2 94%   BMI 28.62 kg/m    Gen: Well appearing, in NAD  HEENT: EOMI, PERRL, mmm, oropharynx clear  CV: Normal rate, regular rhythm, occasionally tachycardic. No m/r/g  Pulm: decreased bs at bilateral bases, otherwise CTA no wheezing, normal work of breathing  Abd: Soft, nt/nd, no rebound/guarding  Ext: Warm and well perfused. No lower extremity edema  Skin: No rash, cyanosis or petechial lesion  Neuro: Alert and answering questions appropriately.     Labs & Studies: I personally reviewed the following studies:  ROUTINE LABS (Last four results):  CMPRecent Labs   Lab 01/12/23  0648 01/11/23  0716 01/10/23  0642 01/09/23  1045    135* 139 139   POTASSIUM 3.8 4.0 3.8 3.8   CHLORIDE 105 104 108* 107   CO2 20* 18* 18* 18*   ANIONGAP 13 13 13 14   GLC 84 85 98 95   BUN 5.6* 5.6* 6.2 7.5   CR 0.47* 0.47* 0.55 0.54   GFRESTIMATED >90 >90 >90 >90   MICAH 8.9 8.9 8.4* 8.4*   PROTTOTAL 6.8 6.7 6.3* 6.3*   ALBUMIN 3.8 3.8 3.6 3.6   BILITOTAL 2.2* 2.8* 2.9* 2.4*   ALKPHOS 69 69 73 75   AST 38* 39* 40* 43*   ALT 14 17 17 14     CBC  Recent Labs   Lab 01/12/23  0648 01/11/23  0716 01/10/23  0642 01/09/23  1045   WBC 12.8* 13.4* 12.0* 12.6*   RBC 2.01* 2.06* 1.96* 2.13*   HGB 6.2* 6.5* 6.3* 6.7*   HCT 17.2* 18.6* 17.7* 19.6*   MCV 86 90 90 92   MCH 30.8 31.6 32.1 31.5   MCHC 36.0 34.9 35.6 34.2   RDW 21.2* 23.2* 26.0* 25.3*    350 314 359     INRNo lab results found in last 7 days.    Medications list for reference:  Current Facility-Administered Medications   Medication     acetaminophen (TYLENOL) tablet 975 mg     albuterol (PROVENTIL) neb solution 2.5 mg     albuterol (PROVENTIL) neb solution 5 mg     alum & mag hydroxide-simethicone (MAALOX) suspension 30 mL     aspirin (ASA) chewable tablet 81 mg     cefTRIAXone (ROCEPHIN) 1 g vial to attach to  mL bag for " ADULTS or NS 50 mL bag for PEDS     diclofenac (VOLTAREN) 1 % topical gel 4 g     doxycycline hyclate (VIBRAMYCIN) capsule 100 mg     FLUoxetine (PROzac) capsule 10 mg     fluticasone-vilanterol (BREO ELLIPTA) 200-25 MCG/ACT inhaler 1 puff     folic acid (FOLVITE) tablet 1 mg     furosemide (LASIX) injection 20 mg     HYDROmorphone (PF) (DILAUDID) injection 0.5 mg     hydroxyurea (HYDREA) capsule 3,000 mg     [Held by provider] ketamine (KETALAR) 2 mg/mL in sodium chloride 0.9 % 50 mL ANALGESIA infusion     lidocaine (LMX4) cream     lidocaine 1 % 1 mL     melatonin tablet 3 mg     methocarbamol (ROBAXIN) tablet 500 mg     naloxone (NARCAN) injection 0.2 mg    Or     naloxone (NARCAN) injection 0.4 mg    Or     naloxone (NARCAN) injection 0.2 mg    Or     naloxone (NARCAN) injection 0.4 mg     ondansetron (ZOFRAN) tablet 8 mg     oxyCODONE IR (ROXICODONE) tablet 20 mg     polyethylene glycol (MIRALAX) Packet 17 g     senna-docusate (SENOKOT-S/PERICOLACE) 8.6-50 MG per tablet 1 tablet    Or     senna-docusate (SENOKOT-S/PERICOLACE) 8.6-50 MG per tablet 2 tablet     sodium chloride (PF) 0.9% PF flush 3 mL     sodium chloride (PF) 0.9% PF flush 3 mL     traMADol (ULTRAM) tablet 50 mg     traZODone (DESYREL) tablet 50 mg

## 2023-01-12 NOTE — PLAN OF CARE
"Goal Outcome Evaluation:  Status: Acute chest syndrom   Vitals: /61 (BP Location: Left arm)   Pulse (!) 122   Temp 97.7  F (36.5  C) (Oral)   Resp 22   Ht 1.626 m (5' 4.02\")   Wt 75.7 kg (166 lb 12.8 oz)   SpO2 90%   BMI 28.62 kg/m      Denies chest pain, tachy   Neuros: A/O x4. Able to communicate her needs. Right hand contracture   IV: R port TKO. R PIV TKO between Abx use   Resp/trach: 3-4L via oxygen mask, dyspnea with excretion. Pt  is maintaining oxygen saturation in 80s in 5-7L . HR 120s RR 22. denies chest pain. PRN neb given  Diet: Reg, poor oral intake   Bowel status: LBM 1/11   : Voiding adequately without difficulty   Skin: WDL   Pain: right and left flank pain, Lower back pain, managed with PRN dilaudid. Ketamine gtt stopped per order.   Activity: SBA   Plan: continue with POC   Update this shift. RR called for pt increased oxygen needs. 5-7L via oxygen mask. EKG, CT and         Plan of Care Reviewed With: patient    Overall Patient Progress: no changeOverall Patient Progress: no change           "

## 2023-01-12 NOTE — CONSULTS
"Care Management Initial Consult    General Information  Assessment completed with: Patient, Care Team Member, -chart review, Patient  Type of CM/SW Visit: Initial Assessment    Primary Care Provider verified and updated as needed: Yes   Readmission within the last 30 days: other (see comments) Patient has sickle cell which causes frequent hospital admissions.        Advance Care Planning:            Communication Assessment  Patient's communication style: spoken language (English or Bilingual)    Hearing Difficulty or Deaf: no   Wear Glasses or Blind: no    Cognitive  Cognitive/Neuro/Behavioral: WDL                      Living Environment:   People in home: sibling(s), Patients mom     Current living Arrangements: house      Able to return to prior arrangements: yes       Family/Social Support:  Care provided by: parent(s)  Provides care for: no one, unable/limited ability to care for self  Marital Status: Single  Other (specify) When asked about support system patient simply said \"myself.\"          Description of Support System: Other (see comments) (Patients mom is her PCA.)    Support Assessment: Limited social contact and support, Minimal outside structure and leisure time activities    Current Resources:   Patient receiving home care services: No     Community Resources: Mom is her PCA.  Equipment currently used at home: none  Supplies currently used at home:      Employment/Financial:  Employment Status: unemployed        Financial Concerns: No concerns identified           Lifestyle & Psychosocial Needs:  Social Determinants of Health     Tobacco Use: Low Risk      Smoking Tobacco Use: Never     Smokeless Tobacco Use: Never     Passive Exposure: Not on file   Alcohol Use: Not on file   Financial Resource Strain: Not on file   Food Insecurity: Not on file   Transportation Needs: Not on file   Physical Activity: Not on file   Stress: Not on file   Social Connections: Not on file   Intimate Partner Violence: " Not At Risk     Fear of Current or Ex-Partner: No     Emotionally Abused: No     Physically Abused: No     Sexually Abused: No   Depression: Not at risk     PHQ-2 Score: 0   Housing Stability: Not on file       Functional Status:  Prior to admission patient needed assistance:              Mental Health Status:  Mental Health Status: No Current Concerns       Chemical Dependency Status:  Chemical Dependency Status: No Current Concerns             Values/Beliefs:  Spiritual, Cultural Beliefs, [employer] Practices, Values that affect care:                 Additional Information:    Jennifer Cervantes is a 22 year old female with a past medical history including Hb-SS disease, sickle cell pain crises, chronic PE without acute cor pulmonale, acute chest syndrome, and right sided hemiplegia and hemiparesis following remote cerebral infarction now admitted for management of acute chest wall pain and possible acute chest syndrome.     Met with patient at bedside to complete assessment. Introduced self and role. When I entered the room patients seemed drowsy, tired and seemed to not want to engage in conversation. Although when I asked if this was an okay time to chat patient was agreeable to assessment. Patient not very forthcoming with information and was short with her answers. Patient plans to discharge home and continue to have her mom be her PCA.      BRITTANIE Allan LSW   7B    Phone: [phone]  Pager: [phone]

## 2023-01-12 NOTE — CODE/RAPID RESPONSE
Rapid Response Team Note    Assessment   In assessment a rapid response was called on Jennifer Cervantes due to worsening acute hypoxic respiratory failure and new tachycardia. This presentation is likely due to known acute chest syndrome and possible PE.     Plan   -  Stat CXR, CT chest, and EKG  -  The Internal Medicine primary team was bedside.  -  Disposition: The patient will remain on the current unit. We will continue to monitor this patient closely.  -  Reassessment and plan follow-up will be performed by the primary team    Andrew Brewer PA-C  [facility] RRT [facility] Job Code Contact #[phone]  [facility] Paging/Directory    Hospital Course   Brief Summary of events leading to rapid response:   Pt with increased O2 needs for worsening hypoxia, as well as new tachycardia    Admission Diagnosis:   Sickle cell pain crisis (H) [D57.00]  Acute respiratory failure with hypoxia (H) [J96.01]  Acute chest syndrome (H) [D57.01]    Physical Exam   Temp: 97.7  F (36.5  C) Temp  Min: 97.7  F (36.5  C)  Max: 98.4  F (36.9  C)  Resp: 22 Resp  Min: 17  Max: 22  SpO2: 90 % SpO2  Min: 75 %  Max: 95 %  Pulse: (!) 122 Pulse  Min: 97  Max: 122    No data recorded  BP: 118/61 Systolic (24hrs), Av , Min:110 , Max:118   Diastolic (24hrs), Av, Min:45, Max:61     I/Os: I/O last 3 completed shifts:  In: 210 [P.O.:200; I.V.:10]  Out: 1800 [Urine:1800]     Exam:   General: in no acute distress  Mental Status: baseline mental status.  LUNGS: CTAB  CV: Tachy yet regular, no murmurs appreciated  EXT: No c/c/e  ABD: soft, ND  SKIN: WWP. No acute rashes noted on exposed areas.     Significant Results and Procedures   Lactic Acid:   Recent Labs   Lab Test 22  2105 22  0015 22  0011 21  0338 07/10/21  0339 21  0508 21  0441   LACT 0.8 0.7 1.1   < >  --   --   --    LACTS  --   --   --   --  0.9 0.6* 0.8    < > = values in this interval not displayed.     CBC:   Recent Labs   Lab Test 23  0648  01/11/23  0716 01/10/23  0642   WBC 12.8* 13.4* 12.0*   HGB 6.2* 6.5* 6.3*   HCT 17.2* 18.6* 17.7*    350 314        Sepsis Evaluation   The patient is not known to have an infection.  NO EVIDENCE OF SEPSIS at this time.  Vital sign, physical exam, and lab findings are due to known acute chest syndrome and possibly new PE.

## 2023-01-13 LAB
ALBUMIN SERPL BCG-MCNC: 3.6 G/DL (ref 3.5–5.2)
ALP SERPL-CCNC: 66 U/L (ref 35–104)
ALT SERPL W P-5'-P-CCNC: 16 U/L (ref 10–35)
ANION GAP SERPL CALCULATED.3IONS-SCNC: 13 MMOL/L (ref 7–15)
AST SERPL W P-5'-P-CCNC: 37 U/L (ref 10–35)
ATRIAL RATE - MUSE: 107 BPM
BILIRUB SERPL-MCNC: 1.7 MG/DL
BUN SERPL-MCNC: 4 MG/DL (ref 6–20)
CALCIUM SERPL-MCNC: 8.8 MG/DL (ref 8.6–10)
CHLORIDE SERPL-SCNC: 102 MMOL/L (ref 98–107)
CREAT SERPL-MCNC: 0.52 MG/DL (ref 0.51–0.95)
DEPRECATED HCO3 PLAS-SCNC: 22 MMOL/L (ref 22–29)
DIASTOLIC BLOOD PRESSURE - MUSE: NORMAL MMHG
ERYTHROCYTE [DISTWIDTH] IN BLOOD BY AUTOMATED COUNT: 20.8 % (ref 10–15)
GFR SERPL CREATININE-BSD FRML MDRD: >90 ML/MIN/1.73M2
GLUCOSE SERPL-MCNC: 89 MG/DL (ref 70–99)
HCT VFR BLD AUTO: 17.6 % (ref 35–47)
HGB BLD-MCNC: 6.3 G/DL (ref 11.7–15.7)
INTERPRETATION ECG - MUSE: NORMAL
MCH RBC QN AUTO: 30 PG (ref 26.5–33)
MCHC RBC AUTO-ENTMCNC: 35.8 G/DL (ref 31.5–36.5)
MCV RBC AUTO: 84 FL (ref 78–100)
P AXIS - MUSE: 59 DEGREES
PLATELET # BLD AUTO: 394 10E3/UL (ref 150–450)
POTASSIUM SERPL-SCNC: 3.6 MMOL/L (ref 3.4–5.3)
PR INTERVAL - MUSE: 138 MS
PROT SERPL-MCNC: 6.7 G/DL (ref 6.4–8.3)
QRS DURATION - MUSE: 74 MS
QT - MUSE: 338 MS
QTC - MUSE: 451 MS
R AXIS - MUSE: 24 DEGREES
RBC # BLD AUTO: 2.1 10E6/UL (ref 3.8–5.2)
RETICS # AUTO: 0.15 10E6/UL (ref 0.03–0.1)
RETICS/RBC NFR AUTO: 14.6 % (ref 0.5–2)
SODIUM SERPL-SCNC: 137 MMOL/L (ref 136–145)
SYSTOLIC BLOOD PRESSURE - MUSE: NORMAL MMHG
T AXIS - MUSE: 20 DEGREES
VENTRICULAR RATE- MUSE: 107 BPM
WBC # BLD AUTO: 12.8 10E3/UL (ref 4–11)

## 2023-01-13 PROCEDURE — 36591 DRAW BLOOD OFF VENOUS DEVICE: CPT | Performed by: STUDENT IN AN ORGANIZED HEALTH CARE EDUCATION/TRAINING PROGRAM

## 2023-01-13 PROCEDURE — 250N000013 HC RX MED GY IP 250 OP 250 PS 637: Performed by: PEDIATRICS

## 2023-01-13 PROCEDURE — 999N000157 HC STATISTIC RCP TIME EA 10 MIN

## 2023-01-13 PROCEDURE — 85014 HEMATOCRIT: CPT | Performed by: PEDIATRICS

## 2023-01-13 PROCEDURE — 250N000009 HC RX 250: Performed by: STUDENT IN AN ORGANIZED HEALTH CARE EDUCATION/TRAINING PROGRAM

## 2023-01-13 PROCEDURE — 120N000002 HC R&B MED SURG/OB UMMC

## 2023-01-13 PROCEDURE — 80053 COMPREHEN METABOLIC PANEL: CPT | Performed by: STUDENT IN AN ORGANIZED HEALTH CARE EDUCATION/TRAINING PROGRAM

## 2023-01-13 PROCEDURE — 250N000013 HC RX MED GY IP 250 OP 250 PS 637: Performed by: INTERNAL MEDICINE

## 2023-01-13 PROCEDURE — 250N000011 HC RX IP 250 OP 636: Performed by: STUDENT IN AN ORGANIZED HEALTH CARE EDUCATION/TRAINING PROGRAM

## 2023-01-13 PROCEDURE — 85045 AUTOMATED RETICULOCYTE COUNT: CPT | Performed by: PEDIATRICS

## 2023-01-13 PROCEDURE — 250N000013 HC RX MED GY IP 250 OP 250 PS 637: Performed by: STUDENT IN AN ORGANIZED HEALTH CARE EDUCATION/TRAINING PROGRAM

## 2023-01-13 PROCEDURE — 250N000011 HC RX IP 250 OP 636: Performed by: INTERNAL MEDICINE

## 2023-01-13 PROCEDURE — 99233 SBSQ HOSP IP/OBS HIGH 50: CPT | Performed by: INTERNAL MEDICINE

## 2023-01-13 RX ORDER — FUROSEMIDE 10 MG/ML
20 INJECTION INTRAMUSCULAR; INTRAVENOUS ONCE
Status: COMPLETED | OUTPATIENT
Start: 2023-01-13 | End: 2023-01-13

## 2023-01-13 RX ORDER — OXYCODONE HYDROCHLORIDE 10 MG/1
20 TABLET ORAL EVERY 4 HOURS PRN
Status: DISCONTINUED | OUTPATIENT
Start: 2023-01-13 | End: 2023-01-16 | Stop reason: HOSPADM

## 2023-01-13 RX ORDER — HYDROMORPHONE HYDROCHLORIDE 1 MG/ML
0.5 INJECTION, SOLUTION INTRAMUSCULAR; INTRAVENOUS; SUBCUTANEOUS EVERY 4 HOURS PRN
Status: DISCONTINUED | OUTPATIENT
Start: 2023-01-13 | End: 2023-01-16 | Stop reason: HOSPADM

## 2023-01-13 RX ADMIN — FOLIC ACID 1 MG: 1 TABLET ORAL at 11:11

## 2023-01-13 RX ADMIN — ASPIRIN 81 MG: 81 TABLET, CHEWABLE ORAL at 20:06

## 2023-01-13 RX ADMIN — CEFTRIAXONE SODIUM 1 G: 1 INJECTION, POWDER, FOR SOLUTION INTRAMUSCULAR; INTRAVENOUS at 11:25

## 2023-01-13 RX ADMIN — HYDROMORPHONE HYDROCHLORIDE 0.5 MG: 1 INJECTION, SOLUTION INTRAMUSCULAR; INTRAVENOUS; SUBCUTANEOUS at 01:46

## 2023-01-13 RX ADMIN — HYDROMORPHONE HYDROCHLORIDE 0.5 MG: 1 INJECTION, SOLUTION INTRAMUSCULAR; INTRAVENOUS; SUBCUTANEOUS at 06:58

## 2023-01-13 RX ADMIN — METHOCARBAMOL 500 MG: 500 TABLET ORAL at 14:41

## 2023-01-13 RX ADMIN — ALBUTEROL SULFATE 5 MG: 2.5 SOLUTION RESPIRATORY (INHALATION) at 16:45

## 2023-01-13 RX ADMIN — DOXYCYCLINE HYCLATE 100 MG: 100 CAPSULE ORAL at 11:12

## 2023-01-13 RX ADMIN — DICLOFENAC SODIUM 4 G: 10 GEL TOPICAL at 20:07

## 2023-01-13 RX ADMIN — FLUOXETINE 10 MG: 10 CAPSULE ORAL at 11:12

## 2023-01-13 RX ADMIN — DOXYCYCLINE HYCLATE 100 MG: 100 CAPSULE ORAL at 20:06

## 2023-01-13 RX ADMIN — HYDROXYUREA 3000 MG: 500 CAPSULE ORAL at 11:12

## 2023-01-13 RX ADMIN — ASPIRIN 81 MG: 81 TABLET, CHEWABLE ORAL at 11:11

## 2023-01-13 RX ADMIN — TRAZODONE HYDROCHLORIDE 50 MG: 50 TABLET ORAL at 20:06

## 2023-01-13 RX ADMIN — ONDANSETRON HYDROCHLORIDE 8 MG: 4 TABLET, FILM COATED ORAL at 11:24

## 2023-01-13 RX ADMIN — SENNOSIDES AND DOCUSATE SODIUM 2 TABLET: 8.6; 5 TABLET ORAL at 11:11

## 2023-01-13 RX ADMIN — METHOCARBAMOL 500 MG: 500 TABLET ORAL at 20:06

## 2023-01-13 RX ADMIN — FLUTICASONE FUROATE AND VILANTEROL TRIFENATATE 1 PUFF: 200; 25 POWDER RESPIRATORY (INHALATION) at 11:12

## 2023-01-13 RX ADMIN — FUROSEMIDE 20 MG: 10 INJECTION, SOLUTION INTRAVENOUS at 14:40

## 2023-01-13 RX ADMIN — OXYCODONE HYDROCHLORIDE 20 MG: 10 TABLET ORAL at 20:06

## 2023-01-13 RX ADMIN — ACETAMINOPHEN 975 MG: 325 TABLET, FILM COATED ORAL at 14:41

## 2023-01-13 RX ADMIN — METHOCARBAMOL 500 MG: 500 TABLET ORAL at 11:11

## 2023-01-13 RX ADMIN — HYDROMORPHONE HYDROCHLORIDE 0.5 MG: 1 INJECTION, SOLUTION INTRAMUSCULAR; INTRAVENOUS; SUBCUTANEOUS at 15:01

## 2023-01-13 ASSESSMENT — ACTIVITIES OF DAILY LIVING (ADL)
ADLS_ACUITY_SCORE: 21
ADLS_ACUITY_SCORE: 22
ADLS_ACUITY_SCORE: 21
ADLS_ACUITY_SCORE: 22
ADLS_ACUITY_SCORE: 21

## 2023-01-13 NOTE — PLAN OF CARE
"/63 (BP Location: Left arm)   Pulse 110   Temp 98.3  F (36.8  C) (Oral)   Resp 16   Ht 1.626 m (5' 4.02\")   Wt 76.3 kg (168 lb 3.2 oz)   SpO2 94%   BMI 28.86 kg/m      RN assumed cares: [id number]  Reason for Admission: Acute chest pain and possible acute chest syndrome with sickle cell crisis  Vitals: WDL  Pain: Complain of pain through out the shift, with some minimal relief following . Pain manage with PRN  IV dilaudid.  Neuro: Alert and awake, oriented x 4  Cardiac: WDL  Respiratory: Has been on 02 through out the shift via oxy mask at 4 L/m.   GI/: voiding without difficulty, no BM this shift.  IV/Drains: has Port a cath left chest and a PIV line left. Ketamine gtts at 6 mgl/hr continuous  Activity: up independently.   Skin: intact  Plan of Care: Continue pain management and monitor for any respiratory distress.             .           "

## 2023-01-13 NOTE — PROGRESS NOTES
[facility]    Medicine Progress Note - Hospitalist Service, GOLD TEAM 11    Date of Admission:  1/8/2023    Assessment & Plan   Jennifer Cervantes is a 22 year old female with a past medical history including Hb-SS disease, sickle cell pain crises, chronic PE without acute cor pulmonale, acute chest syndrome, and right sided hemiplegia and hemiparesis following remote cerebral infarction now admitted for management of acute chest wall pain and possible acute chest syndrome.     # Acute Hypoxic Respiratory Failure- Asymptomatic: improving  # Acute Chest Syndrome  Hypoxia gradually worsening with need for increasing supplement O2. Patient remains asymptomatic during this episodes, except for tachycardia to 110s. CXR w/ increasing pulmonary edema and atelectasis. Patient w/o cough/congestion/fevers/other sick symptoms.    - supplemental O2 w/ goal sats >90%  - Diuresis: Lasix IV 20mg spot doses (1x today)  - Abx: ceftriaxone/doxycycline   - breathing treatment: prn albuterol  - IS q1h (patient to set timed alarm for reminder while awake), encourage out of bed/ambulation  - Hgb stable. Holding on transfusion per hematology     #AcuteSickle Cell Vaso-occlusive Pain Crisis  #Hx Sickle Cell (SS) with hx CVA, acute chest pulm htn, chronic PE  Presented with acute onset chest wall pain consistent with previous vasoocclusive pain. Home pain management with oxycodone 15mg q4h PRN, has been taking more or less scheduled recently due to pain being worse in winter.  Unable to have acute pain infusion appt Friday 1/6.  Care plan in place followed in ED, will initiate inpatient care per pain plan.  Discussed with hematology, severe pain when evaluated, reasonable to give IV pain control consistent with outpatient infusion plan, then transition to inpatient plan with oral oxy, ketamine, ASA. Ketorolac has also been helpful, will continue. Regarding acute chest, hypoxia with white count and  "streaky atelectasis in ED, neg VQ scan, breathing comfortably with non-focal exam, no tachypnea or increased work of breathing, denies respiratory symptoms on my exam; persistent O2 demand, slowly weaning.  - Pain mgmt per inpatient care plan:    - discontinued:ketamine gtt at 6mg/hr(previously felt bad at 8mg/hr)    - Opiates: increased Oxycodone 20mg q4h PRN or Dilaudid 1mg q4h prn   - Voltaren gel PRN, Robaxin 500mg TID   - Continue PTA ASA   - s/p Ketorolac 30mg q6h  - Bowel regimen: Senna/docusate and miralax  - Continue PTA hydroxyuria and folic acid  - Hold deferasirox pending heme    #Hyponatremia- resolved:   - Poor PO intake, corrected s/p fluid resuscitation     #Hx of CVA: continue PTA ASA BID  #Hx asthma: cont PTA budesonide/formoterol, PRN albuterol  #Depression: cont PTA prozac, health psych consulted.          Diet: Regular Diet Adult    DVT Prophylaxis: ASA BID  Lopez Catheter: Not present  Lines: PRESENT      Port A Cath Single 04/21/21 Left Chest wall-Site Assessment: WDL      Cardiac Monitoring: None  Code Status: Full Code      Clinically Significant Risk Factors                        # Overweight: Estimated body mass index is 28.86 kg/m  as calculated from the following:    Height as of this encounter: 1.626 m (5' 4.02\").    Weight as of this encounter: 76.3 kg (168 lb 3.2 oz).          Disposition Plan      Expected Discharge Date: 01/14/2023      Destination: home with family  Discharge Comments: Discharge Home  Delay: increasing O2 requirements 1/12          Zaynab Madison MD  Hospitalist Service, GOLD TEAM 11  [facility]  Securely message with [employer] (more info)  Text page via [facility] Paging/Directory   See signed in provider for up to date coverage information  ______________________________________________________________________    Interval History   Overnight, patient had improving O2 sats s/p IV diuresis and able to wean from 8L NC down to 5L " NC. Acute Hypoxia likely related to volume overload/pulmonary edema and less likely PE, nima given negative VQ scan 1/8. Will discontinue repeat VQ scan given clinical improvement. Will continue to work on weaning O2 as baseline patient doesn't use Oxygen at home. AM Hgb stable at 6.3 w/ %retic 14.6. Hematology still recommending against transfusion at this time. Patient tolerated coming off Ketamine gtt well but still preferring IV dilaudid use over PO oxycodone. Will try utilizing PO pain control if possible today w/ utilization of pain adjuncts like the voltaren gel or Robaxin. Patient reports eating well. Last BM 2 evenings ago, will continue w/ current bowel regimen.     Physical Exam   Vital Signs: Temp: 98.2  F (36.8  C) Temp src: Oral BP: 127/69 Pulse: 101   Resp: 16 SpO2: 94 % O2 Device: Nasal cannula Oxygen Delivery: 5 LPM  Weight: 168 lbs 3.2 oz    General: alert & oriented, sitting up in bed, MMM, EOMI w/o scleral icterus, neck FROM, pleasant   Resp: CTAB w/o focal findings w/ slightly decreased aeration in blt lung bases (R>L), no increased WOB, on NC  Cards: sinus tachycardia w/o murmurs/rubs/gallops, no JVD, no LE edema  GI: soft, pain in R.flank area (improved)  Skin: warm and well perfused  Neuro/MSK:  moving all 4 extremities (R.UE w/ minor residual deficit/contraction s/p CVA), ambulating up to bathroom independantly    Medical Decision Making       50 MINUTES SPENT BY ME on the date of service doing chart review, history, exam, documentation & further activities per the note.      Data   ------------------------- PAST 24 HR DATA REVIEWED -----------------------------------------------    I have personally reviewed the following data over the past 24 hrs:    12.8 (H)  \   6.2 (LL)   / 324     138 105 5.6 (L) /  84   3.8 20 (L) 0.47 (L) \       ALT: 14 AST: 38 (H) AP: 69 TBILI: 2.2 (H)   ALB: 3.8 TOT PROTEIN: 6.8 LIPASE: N/A       Ferritin:  N/A % Retic:  16.3 (H) LDH:  N/A       Imaging results  reviewed over the past 24 hrs:   Recent Results (from the past 24 hour(s))   XR Chest Port 1 View    Narrative    EXAM:  XR CHEST PORT 1 VIEW    INDICATION: desats    COMPARISON:  Chest x-ray 1/11/2023    FINDINGS:  Single AP view of the chest. A left Port-A-Cath terminates over the  cavoatrial junction.    Cardiomediastinal silhouette within normal limits.  Low lung volumes  with silhouetting of left and right hemidiaphragm. Perihilar and  bibasilar opacities. No pneumothorax.   Loss of sharpening of the  costophrenic angle bilaterally.  Unremarkable upper abdomen. No acute  bony lesions.      Impression    IMPRESSION:  Decreased lung volumes with perihilar and increased bibasilar  opacities, likely edema/atelectasis. Possible trace pleural effusions.    I have personally reviewed the examination and initial interpretation  and I agree with the findings.    MARY JANE JUÁREZ MD         SYSTEM ID:  [id number]

## 2023-01-13 NOTE — PLAN OF CARE
Goal Outcome Evaluation: Ongoing; Not progressing      Plan of Care Reviewed With: patient    Overall Patient Progress: no change    Outcome Evaluation: Pt with labile oxygen saturations this shift, RRT called during day, Pain well managed with IV dilaudid, off ketamine gtt this AM.    RN assumed cares at 1500, Pt alert and oriented, labile saturations dipping to the high 60's/low 70's this afternoon.  Discovered that patient nasal cannula was not connected to regulator.  Face mask placed and patient stable on 4L oxymask.  Pt asymptomatic throughout ordeal.  EKG complete; sinus tachycardia.  PT to have VQ scan done when available.  PIV saline locked, port infusing TKO between abx.  IV dilaudid for pain management.  Plan to discharge to home once oxygen needs are addressed and tolerating oral pain management.  No other acute incidents this shift.  Continue to monitor and notify MD of any changes.

## 2023-01-14 LAB
ALBUMIN SERPL BCG-MCNC: 3.8 G/DL (ref 3.5–5.2)
ALP SERPL-CCNC: 68 U/L (ref 35–104)
ALT SERPL W P-5'-P-CCNC: 21 U/L (ref 10–35)
ANION GAP SERPL CALCULATED.3IONS-SCNC: 13 MMOL/L (ref 7–15)
AST SERPL W P-5'-P-CCNC: 38 U/L (ref 10–35)
BILIRUB SERPL-MCNC: 1.8 MG/DL
BUN SERPL-MCNC: 3.5 MG/DL (ref 6–20)
CALCIUM SERPL-MCNC: 9.1 MG/DL (ref 8.6–10)
CHLORIDE SERPL-SCNC: 101 MMOL/L (ref 98–107)
CREAT SERPL-MCNC: 0.5 MG/DL (ref 0.51–0.95)
DEPRECATED HCO3 PLAS-SCNC: 22 MMOL/L (ref 22–29)
ERYTHROCYTE [DISTWIDTH] IN BLOOD BY AUTOMATED COUNT: 21.2 % (ref 10–15)
GFR SERPL CREATININE-BSD FRML MDRD: >90 ML/MIN/1.73M2
GLUCOSE SERPL-MCNC: 91 MG/DL (ref 70–99)
HCT VFR BLD AUTO: 17.9 % (ref 35–47)
HGB BLD-MCNC: 6.2 G/DL (ref 11.7–15.7)
MCH RBC QN AUTO: 29.1 PG (ref 26.5–33)
MCHC RBC AUTO-ENTMCNC: 34.6 G/DL (ref 31.5–36.5)
MCV RBC AUTO: 84 FL (ref 78–100)
PLATELET # BLD AUTO: 420 10E3/UL (ref 150–450)
POTASSIUM SERPL-SCNC: 3.6 MMOL/L (ref 3.4–5.3)
PROT SERPL-MCNC: 6.8 G/DL (ref 6.4–8.3)
RBC # BLD AUTO: 2.13 10E6/UL (ref 3.8–5.2)
RETICS # AUTO: 0.27 10E6/UL (ref 0.03–0.1)
RETICS/RBC NFR AUTO: 12.8 % (ref 0.5–2)
SODIUM SERPL-SCNC: 136 MMOL/L (ref 136–145)
WBC # BLD AUTO: 11 10E3/UL (ref 4–11)

## 2023-01-14 PROCEDURE — 85045 AUTOMATED RETICULOCYTE COUNT: CPT | Performed by: PEDIATRICS

## 2023-01-14 PROCEDURE — 250N000013 HC RX MED GY IP 250 OP 250 PS 637: Performed by: PEDIATRICS

## 2023-01-14 PROCEDURE — 250N000013 HC RX MED GY IP 250 OP 250 PS 637: Performed by: STUDENT IN AN ORGANIZED HEALTH CARE EDUCATION/TRAINING PROGRAM

## 2023-01-14 PROCEDURE — 80053 COMPREHEN METABOLIC PANEL: CPT | Performed by: STUDENT IN AN ORGANIZED HEALTH CARE EDUCATION/TRAINING PROGRAM

## 2023-01-14 PROCEDURE — 36415 COLL VENOUS BLD VENIPUNCTURE: CPT | Performed by: STUDENT IN AN ORGANIZED HEALTH CARE EDUCATION/TRAINING PROGRAM

## 2023-01-14 PROCEDURE — 250N000013 HC RX MED GY IP 250 OP 250 PS 637: Performed by: INTERNAL MEDICINE

## 2023-01-14 PROCEDURE — 250N000011 HC RX IP 250 OP 636: Performed by: INTERNAL MEDICINE

## 2023-01-14 PROCEDURE — 99233 SBSQ HOSP IP/OBS HIGH 50: CPT | Performed by: INTERNAL MEDICINE

## 2023-01-14 PROCEDURE — 120N000002 HC R&B MED SURG/OB UMMC

## 2023-01-14 PROCEDURE — 85014 HEMATOCRIT: CPT | Performed by: PEDIATRICS

## 2023-01-14 RX ORDER — FUROSEMIDE 10 MG/ML
20 INJECTION INTRAMUSCULAR; INTRAVENOUS ONCE
Status: COMPLETED | OUTPATIENT
Start: 2023-01-14 | End: 2023-01-14

## 2023-01-14 RX ADMIN — HYDROMORPHONE HYDROCHLORIDE 0.5 MG: 1 INJECTION, SOLUTION INTRAMUSCULAR; INTRAVENOUS; SUBCUTANEOUS at 20:53

## 2023-01-14 RX ADMIN — ASPIRIN 81 MG: 81 TABLET, CHEWABLE ORAL at 20:52

## 2023-01-14 RX ADMIN — FUROSEMIDE 20 MG: 10 INJECTION, SOLUTION INTRAVENOUS at 13:28

## 2023-01-14 RX ADMIN — DICLOFENAC SODIUM 4 G: 10 GEL TOPICAL at 13:29

## 2023-01-14 RX ADMIN — HYDROMORPHONE HYDROCHLORIDE 0.5 MG: 1 INJECTION, SOLUTION INTRAMUSCULAR; INTRAVENOUS; SUBCUTANEOUS at 00:06

## 2023-01-14 RX ADMIN — METHOCARBAMOL 500 MG: 500 TABLET ORAL at 20:52

## 2023-01-14 RX ADMIN — METHOCARBAMOL 500 MG: 500 TABLET ORAL at 13:27

## 2023-01-14 RX ADMIN — SENNOSIDES AND DOCUSATE SODIUM 2 TABLET: 8.6; 5 TABLET ORAL at 13:43

## 2023-01-14 RX ADMIN — ASPIRIN 81 MG: 81 TABLET, CHEWABLE ORAL at 13:26

## 2023-01-14 RX ADMIN — FOLIC ACID 1 MG: 1 TABLET ORAL at 13:26

## 2023-01-14 RX ADMIN — OXYCODONE HYDROCHLORIDE 20 MG: 10 TABLET ORAL at 16:26

## 2023-01-14 RX ADMIN — HYDROMORPHONE HYDROCHLORIDE 0.5 MG: 1 INJECTION, SOLUTION INTRAMUSCULAR; INTRAVENOUS; SUBCUTANEOUS at 12:15

## 2023-01-14 RX ADMIN — FLUOXETINE 10 MG: 10 CAPSULE ORAL at 13:27

## 2023-01-14 RX ADMIN — HYDROXYUREA 3000 MG: 500 CAPSULE ORAL at 13:30

## 2023-01-14 ASSESSMENT — ACTIVITIES OF DAILY LIVING (ADL)
ADLS_ACUITY_SCORE: 21

## 2023-01-14 NOTE — PROGRESS NOTES
[facility]    Medicine Progress Note - Hospitalist Service, GOLD TEAM 11    Date of Admission:  1/8/2023    Assessment & Plan   Jennifer Cervantes is a 22 year old female with a past medical history including Hb-SS disease, sickle cell pain crises, chronic PE without acute cor pulmonale, acute chest syndrome, and right sided hemiplegia and hemiparesis following remote cerebral infarction now admitted for management of acute chest wall pain and possible acute chest syndrome.     # Acute Hypoxic Respiratory Failure- Asymptomatic: improving  # Acute Chest Syndrome  # Pulmonary Edema  Hypoxia gradually worsening with need for increasing supplement O2. Patient remains asymptomatic during this episodes, except for tachycardia to 110s. CXR w/ increasing pulmonary edema and atelectasis. Patient w/o cough/congestion/fevers/other sick symptoms.    - supplemental O2 w/ goal sats >90%  - Diuresis: Lasix IV 20mg spot doses (1x today)  - Abx: ceftriaxone/doxycycline   - breathing treatment: prn albuterol  - IS q1h (patient to set timed alarm for reminder while awake), encourage out of bed/ambulation  - Hgb stable. Holding on transfusion per hematology     #AcuteSickle Cell Vaso-occlusive Pain Crisis  #Hx Sickle Cell (SS) with hx CVA, acute chest pulm htn, chronic PE  Presented with acute onset chest wall pain consistent with previous vasoocclusive pain. Home pain management with oxycodone 15mg q4h PRN, has been taking more or less scheduled recently due to pain being worse in winter.  Unable to have acute pain infusion appt Friday 1/6.  Care plan in place followed in ED, will initiate inpatient care per pain plan.  Discussed with hematology, severe pain when evaluated, reasonable to give IV pain control consistent with outpatient infusion plan, then transition to inpatient plan with oral oxy, ketamine, ASA. Ketorolac has also been helpful, will continue. Regarding acute chest, hypoxia with  "white count and streaky atelectasis in ED, neg VQ scan, breathing comfortably with non-focal exam, no tachypnea or increased work of breathing, denies respiratory symptoms on my exam; persistent O2 demand, slowly weaning.  - Pain mgmt per inpatient care plan:    - discontinued:ketamine gtt at 6mg/hr(previously felt bad at 8mg/hr)    - Opiates: Oxycodone 20mg q4h PRN or Dilaudid 1mg q4h prn   - Voltaren gel PRN, Robaxin 500mg TID   - Continue PTA ASA   - s/p Ketorolac 30mg q6h  - Bowel regimen: Senna/docusate and miralax  - Continue PTA hydroxyuria and folic acid  - Hold deferasirox pending heme    #Hyponatremia- resolved:   - Poor PO intake, corrected s/p fluid resuscitation     #Hx of CVA: continue PTA ASA BID  #Hx asthma: cont PTA budesonide/formoterol, PRN albuterol  #Depression: cont PTA prozac, health psych consulted.            Diet: Regular Diet Adult    DVT Prophylaxis: ASA BID  Lopez Catheter: Not present  Lines: PRESENT      Port A Cath Single 04/21/21 Left Chest wall-Site Assessment: WDL      Cardiac Monitoring: None  Code Status: Full Code      Clinically Significant Risk Factors                        # Overweight: Estimated body mass index is 26.56 kg/m  as calculated from the following:    Height as of this encounter: 1.626 m (5' 4.02\").    Weight as of this encounter: 70.2 kg (154 lb 12.8 oz).          Disposition Plan      Expected Discharge Date: 01/14/2023      Destination: home with family  Discharge Comments: Dispo: Home  Delay: increasing O2 requirements 1/12, Pain management plan (IV pain meds)  Progress: 5L          Zaynab Madison MD  Hospitalist Service, GOLD TEAM 11  [facility]  Securely message with [employer] (more info)  Text page via [employer] Paging/Directory   See signed in provider for up to date coverage information  ______________________________________________________________________    Interval History   No acute events overnight. Patient " continued to wean down on O2, this AM on 2L NC. Reports pain is improved and wanting to leave. Discussed that she is on oxygen currently but patient continuing to request to leave. Encouraged her that progress was being made in regards to her FiO2 w/ the lasix and encouraged her to continue to uses her IS and ambulate. Discussed redosing IV lasix again today. Appreciated hematology assistance in talking w/ patient about why she needs to stay in the hospital til she is off oxygen. Patient finally agreed. Patient complaining of R. Ear pressure/pain. On exam blt TMs appear normal w/o signs of infection or effusion. Will continue to monitor. Patient reports eating well. Denies constipation concerns.     Physical Exam   Vital Signs: Temp: 98  F (36.7  C) Temp src: Oral BP: 112/62 Pulse: 94   Resp: 18 SpO2: 92 % O2 Device: Nasal cannula Oxygen Delivery: 2 LPM  Weight: 154 lbs 12.8 oz    General: alert & oriented, sitting up in bed, MMM, EOMI w/o scleral icterus, neck FROM, pleasant   Ear: Blt TMs visualized w/o signs of infection, minimal ear wax present in canal.   Resp: CTAB w/o focal findings w/ slightly decreased aeration in blt lung bases (R>L), no increased WOB, on NC  Cards: sinus tachycardia w/o murmurs/rubs/gallops, no JVD, no LE edema  GI: soft, pain in R.flank area (improved)  Skin: warm and well perfused  Neuro/MSK:  moving all 4 extremities (R.UE w/ minor residual deficit/contraction s/p CVA), ambulating up to bathroom independantly    Medical Decision Making       50 MINUTES SPENT BY ME on the date of service doing chart review, history, exam, documentation & further activities per the note.      Data   ------------------------- PAST 24 HR DATA REVIEWED -----------------------------------------------    I have personally reviewed the following data over the past 24 hrs:    11.0  \   6.2 (LL)   / 420     136 101 3.5 (L) /  91   3.6 22 0.50 (L) \       ALT: 21 AST: 38 (H) AP: 68 TBILI: 1.8 (H)   ALB: 3.8 TOT  PROTEIN: 6.8 LIPASE: N/A       Ferritin:  N/A % Retic:  12.8 (H) LDH:  N/A       Imaging results reviewed over the past 24 hrs:   No results found for this or any previous visit (from the past 24 hour(s)).

## 2023-01-14 NOTE — PROVIDER NOTIFICATION
Critical Test Results/Notification    Critical lab result (name and value): Hgb 6.2   What time did lab notify you? 09:00  What provider did you notify? Zaynab Madison MD ( [id number])  Was the provider notified within 30 min? Yes   Why was provider not notified?    Response from provider: Paged placed to MD

## 2023-01-14 NOTE — PLAN OF CARE
Cares from: [id number]    V/S & pain: VSS on 2L NC ex tachy, slight relief of pain w/ PRN oxycodone/IV dilaudid   Neuro: A/O x4, calm and cooperative   Respiratory: stable on 3L NC, continuous pulse ox monitoring to the desk, slight ORTEGA w/ clear/equal lung sounds bilaterally   Skin: no skin concerns present ex pt refused full skin assessment   GI/: voiding spontaneously, no BM reported   Nutrition: regular diet w/ fair appetite and adequate po intake  Lines/drains: L port SL  Activity: up ind/sba in room   Labs: monitoring HGB level, no RN managed labs     Events this shift: no acute events this shift, pt slept well b/t cares. A/O x4. Up ind/sba in room. Regular diet w/ fair appetite. Pt was able to be decreased to 2L NC and stating well w/ some ORTEGA. PRN pain medications given w/ slight relief. No skin concerns present. call light w/in reach and able to make needs known, will continue to monitor.    Plan: continue w/ poc       Goal Outcome Evaluation:      Plan of Care Reviewed With: patient    Overall Patient Progress: no changeOverall Patient Progress: no change    Outcome Evaluation: pain management, oxygen monitoring

## 2023-01-15 LAB
ALBUMIN SERPL BCG-MCNC: 4 G/DL (ref 3.5–5.2)
ALP SERPL-CCNC: 73 U/L (ref 35–104)
ALT SERPL W P-5'-P-CCNC: 17 U/L (ref 10–35)
ANION GAP SERPL CALCULATED.3IONS-SCNC: 16 MMOL/L (ref 7–15)
AST SERPL W P-5'-P-CCNC: 36 U/L (ref 10–35)
BILIRUB SERPL-MCNC: 1.8 MG/DL
BUN SERPL-MCNC: 5.7 MG/DL (ref 6–20)
CALCIUM SERPL-MCNC: 9.4 MG/DL (ref 8.6–10)
CHLORIDE SERPL-SCNC: 100 MMOL/L (ref 98–107)
CREAT SERPL-MCNC: 0.54 MG/DL (ref 0.51–0.95)
DEPRECATED HCO3 PLAS-SCNC: 22 MMOL/L (ref 22–29)
ERYTHROCYTE [DISTWIDTH] IN BLOOD BY AUTOMATED COUNT: 20.9 % (ref 10–15)
GFR SERPL CREATININE-BSD FRML MDRD: >90 ML/MIN/1.73M2
GLUCOSE SERPL-MCNC: 87 MG/DL (ref 70–99)
HCT VFR BLD AUTO: 19 % (ref 35–47)
HGB BLD-MCNC: 6.8 G/DL (ref 11.7–15.7)
MCH RBC QN AUTO: 29.3 PG (ref 26.5–33)
MCHC RBC AUTO-ENTMCNC: 35.8 G/DL (ref 31.5–36.5)
MCV RBC AUTO: 82 FL (ref 78–100)
PLATELET # BLD AUTO: 480 10E3/UL (ref 150–450)
POTASSIUM SERPL-SCNC: 3.6 MMOL/L (ref 3.4–5.3)
PROT SERPL-MCNC: 7.4 G/DL (ref 6.4–8.3)
RBC # BLD AUTO: 2.32 10E6/UL (ref 3.8–5.2)
RETICS # AUTO: 0.31 10E6/UL (ref 0.03–0.1)
RETICS/RBC NFR AUTO: 13.3 % (ref 0.5–2)
SODIUM SERPL-SCNC: 138 MMOL/L (ref 136–145)
WBC # BLD AUTO: 10.7 10E3/UL (ref 4–11)

## 2023-01-15 PROCEDURE — 120N000002 HC R&B MED SURG/OB UMMC

## 2023-01-15 PROCEDURE — 250N000011 HC RX IP 250 OP 636: Performed by: INTERNAL MEDICINE

## 2023-01-15 PROCEDURE — 85045 AUTOMATED RETICULOCYTE COUNT: CPT | Performed by: PEDIATRICS

## 2023-01-15 PROCEDURE — 99232 SBSQ HOSP IP/OBS MODERATE 35: CPT | Performed by: INTERNAL MEDICINE

## 2023-01-15 PROCEDURE — 36591 DRAW BLOOD OFF VENOUS DEVICE: CPT | Performed by: STUDENT IN AN ORGANIZED HEALTH CARE EDUCATION/TRAINING PROGRAM

## 2023-01-15 PROCEDURE — 85027 COMPLETE CBC AUTOMATED: CPT | Performed by: PEDIATRICS

## 2023-01-15 PROCEDURE — 250N000013 HC RX MED GY IP 250 OP 250 PS 637: Performed by: INTERNAL MEDICINE

## 2023-01-15 PROCEDURE — 250N000013 HC RX MED GY IP 250 OP 250 PS 637: Performed by: PEDIATRICS

## 2023-01-15 PROCEDURE — 80053 COMPREHEN METABOLIC PANEL: CPT | Performed by: STUDENT IN AN ORGANIZED HEALTH CARE EDUCATION/TRAINING PROGRAM

## 2023-01-15 PROCEDURE — 250N000013 HC RX MED GY IP 250 OP 250 PS 637: Performed by: STUDENT IN AN ORGANIZED HEALTH CARE EDUCATION/TRAINING PROGRAM

## 2023-01-15 RX ORDER — HEPARIN SODIUM (PORCINE) LOCK FLUSH IV SOLN 100 UNIT/ML 100 UNIT/ML
5-10 SOLUTION INTRAVENOUS
Status: DISCONTINUED | OUTPATIENT
Start: 2023-01-15 | End: 2023-01-16 | Stop reason: HOSPADM

## 2023-01-15 RX ORDER — HEPARIN SODIUM,PORCINE 10 UNIT/ML
5-10 VIAL (ML) INTRAVENOUS
Status: DISCONTINUED | OUTPATIENT
Start: 2023-01-15 | End: 2023-01-16 | Stop reason: HOSPADM

## 2023-01-15 RX ORDER — FUROSEMIDE 10 MG/ML
20 INJECTION INTRAMUSCULAR; INTRAVENOUS ONCE
Status: COMPLETED | OUTPATIENT
Start: 2023-01-15 | End: 2023-01-15

## 2023-01-15 RX ORDER — HEPARIN SODIUM,PORCINE 10 UNIT/ML
5-10 VIAL (ML) INTRAVENOUS EVERY 24 HOURS
Status: DISCONTINUED | OUTPATIENT
Start: 2023-01-15 | End: 2023-01-16 | Stop reason: HOSPADM

## 2023-01-15 RX ADMIN — ASPIRIN 81 MG: 81 TABLET, CHEWABLE ORAL at 10:46

## 2023-01-15 RX ADMIN — METHOCARBAMOL 500 MG: 500 TABLET ORAL at 19:50

## 2023-01-15 RX ADMIN — HYDROMORPHONE HYDROCHLORIDE 0.5 MG: 1 INJECTION, SOLUTION INTRAMUSCULAR; INTRAVENOUS; SUBCUTANEOUS at 08:29

## 2023-01-15 RX ADMIN — DICLOFENAC SODIUM 4 G: 10 GEL TOPICAL at 19:50

## 2023-01-15 RX ADMIN — FLUOXETINE 10 MG: 10 CAPSULE ORAL at 10:47

## 2023-01-15 RX ADMIN — TRAZODONE HYDROCHLORIDE 50 MG: 50 TABLET ORAL at 01:09

## 2023-01-15 RX ADMIN — DICLOFENAC SODIUM 4 G: 10 GEL TOPICAL at 08:34

## 2023-01-15 RX ADMIN — HYDROXYUREA 3000 MG: 500 CAPSULE ORAL at 10:45

## 2023-01-15 RX ADMIN — ASPIRIN 81 MG: 81 TABLET, CHEWABLE ORAL at 19:50

## 2023-01-15 RX ADMIN — METHOCARBAMOL 500 MG: 500 TABLET ORAL at 14:40

## 2023-01-15 RX ADMIN — FOLIC ACID 1 MG: 1 TABLET ORAL at 10:46

## 2023-01-15 RX ADMIN — HYDROMORPHONE HYDROCHLORIDE 0.5 MG: 1 INJECTION, SOLUTION INTRAMUSCULAR; INTRAVENOUS; SUBCUTANEOUS at 01:09

## 2023-01-15 RX ADMIN — HYDROMORPHONE HYDROCHLORIDE 0.5 MG: 1 INJECTION, SOLUTION INTRAMUSCULAR; INTRAVENOUS; SUBCUTANEOUS at 18:39

## 2023-01-15 RX ADMIN — METHOCARBAMOL 500 MG: 500 TABLET ORAL at 10:47

## 2023-01-15 RX ADMIN — FUROSEMIDE 20 MG: 10 INJECTION, SOLUTION INTRAVENOUS at 06:57

## 2023-01-15 RX ADMIN — Medication 5 ML: at 22:51

## 2023-01-15 RX ADMIN — TRAZODONE HYDROCHLORIDE 50 MG: 50 TABLET ORAL at 22:51

## 2023-01-15 RX ADMIN — HYDROMORPHONE HYDROCHLORIDE 0.5 MG: 1 INJECTION, SOLUTION INTRAMUSCULAR; INTRAVENOUS; SUBCUTANEOUS at 22:52

## 2023-01-15 RX ADMIN — HYDROMORPHONE HYDROCHLORIDE 0.5 MG: 1 INJECTION, SOLUTION INTRAMUSCULAR; INTRAVENOUS; SUBCUTANEOUS at 14:39

## 2023-01-15 ASSESSMENT — ACTIVITIES OF DAILY LIVING (ADL)
ADLS_ACUITY_SCORE: 19
ADLS_ACUITY_SCORE: 21
ADLS_ACUITY_SCORE: 19
ADLS_ACUITY_SCORE: 21
ADLS_ACUITY_SCORE: 19
ADLS_ACUITY_SCORE: 21

## 2023-01-15 NOTE — PLAN OF CARE
"Blood pressure 115/66, pulse 93, temperature 97.9  F (36.6  C), temperature source Oral, resp. rate 16, height 1.626 m (5' 4.02\"), weight 71.7 kg (158 lb 1.6 oz), SpO2 95 %, Via 2L NC .     Assumed care 07- 11:00 am.       Patient A & O X 4, able to make needs known . No respiratory distress noted . C/o right chest pain and rate 10/10, given Dilaudid IV PRN , received scheduled medications . Hgb 6.8 . Primary team aware . Appetite fair . Patient up with SBA , activity offered patient to sit in chair , and Patient declined at this time . Right port intact. Call light within reach, continue with POC and update MD with change.            Goal Outcome Evaluation:      Plan of Care Reviewed With: patient    Overall Patient Progress: no change. Hgb 6.8. O2 sat weaned  from 4L NC to 2L NC.            "

## 2023-01-15 NOTE — PLAN OF CARE
Cares from: [id number]     V/S & pain: VSS on 3L NC ex tachy, slight relief of pain w/ PRN IV dilaudid   Neuro: A/O x4, calm and cooperative   Respiratory: stable on 3L NC, continuous pulse ox monitoring to the desk, slight ORTEGA w/ clear/equal lung sounds bilaterally   Skin: no skin concerns present ex pt refused full skin assessment, RUE flexion/contracted at baseline   GI/: voiding spontaneously, no BM    Nutrition: regular diet w/ fair appetite and adequate po intake  Lines/drains: L port SL  Activity: up sba in room   Labs: continue monitoring HGB level, no RN managed labs      Events this shift: no acute events this shift, pt slept well b/t cares. A/O x4. Up sba in room. Regular diet w/ fair appetite. Pt remains stable on 3L NC and stating well w/ some ORTEGA. PRN pain medications given w/ slight relief. No skin concerns present. 1x dose of IV lasix given this morning. call light w/in reach and able to make needs known, will continue to monitor.     Plan: continue w/ poc, possible home discharge today.     Goal Outcome Evaluation:      Plan of Care Reviewed With: patient    Overall Patient Progress: no changeOverall Patient Progress: no change    Outcome Evaluation: pain management, oxygen saturations

## 2023-01-15 NOTE — PROGRESS NOTES
[facility]    Medicine Progress Note - Hospitalist Service, GOLD TEAM 11    Date of Admission:  1/8/2023    Assessment & Plan   Jennifer Cervantes is a 22 year old female with a past medical history including Hb-SS disease, sickle cell pain crises, chronic PE without acute cor pulmonale, acute chest syndrome, and right sided hemiplegia and hemiparesis following remote cerebral infarction now admitted for management of acute chest wall pain and possible acute chest syndrome.     # Acute Hypoxic Respiratory Failure- Asymptomatic: improving  # Acute Chest Syndrome  # Acute Pulmonary Edema  Hypoxia gradually worsening with need for increasing supplement O2. Patient remains asymptomatic during this episodes, except for tachycardia to 110s. CXR w/ increasing pulmonary edema and atelectasis. Patient w/o cough/congestion/fevers/other sick symptoms.    - supplemental O2 w/ goal sats >90%  - Diuresis: Lasix IV 20mg spot doses (1x today)  - Abx: ceftriaxone/doxycycline   - breathing treatment: prn albuterol  - IS q1h (patient to set timed alarm for reminder while awake), encourage out of bed/ambulation  - Hgb stable. Holding on transfusion per hematology     #AcuteSickle Cell Vaso-occlusive Pain Crisis  #Hx Sickle Cell (SS) with hx CVA, acute chest pulm htn, chronic PE  Presented with acute onset chest wall pain consistent with previous vasoocclusive pain. Home pain management with oxycodone 15mg q4h PRN, has been taking more or less scheduled recently due to pain being worse in winter.  Unable to have acute pain infusion appt Friday 1/6.  Care plan in place followed in ED, will initiate inpatient care per pain plan.  Discussed with hematology, severe pain when evaluated, reasonable to give IV pain control consistent with outpatient infusion plan, then transition to inpatient plan with oral oxy, ketamine, ASA. Ketorolac has also been helpful, will continue. Regarding acute chest,  "hypoxia with white count and streaky atelectasis in ED, neg VQ scan, breathing comfortably with non-focal exam, no tachypnea or increased work of breathing, denies respiratory symptoms on my exam; persistent O2 demand, slowly weaning.  - Pain mgmt per inpatient care plan:    - s/p ketamine gtt at 6mg/hr(previously felt bad at 8mg/hr)    - Opiates: Oxycodone 20mg q4h PRN or Dilaudid 1mg q4h prn   - Voltaren gel PRN, Robaxin 500mg TID   - Continue PTA ASA   - s/p Ketorolac 30mg q6h  - Bowel regimen: Senna/docusate and miralax  - Continue PTA hydroxyuria and folic acid  - Hold deferasirox pending heme    R. Ear Pain:   TM exam on 1/14 revealed nm appearance of R.TM w/o signs of infection and symmetric appearance to L. TM. No abx needed at this time. Will continue to monitor    #Hyponatremia- resolved:   - Poor PO intake, corrected s/p fluid resuscitation     #Hx of CVA: continue PTA ASA BID  #Hx asthma: cont PTA budesonide/formoterol, PRN albuterol  #Depression: cont PTA prozac, health psych consulted.          Diet: Regular Diet Adult    DVT Prophylaxis: asa BID  Lopez Catheter: Not present  Lines: PRESENT      Port A Cath Single 04/21/21 Left Chest wall-Site Assessment: WDL  Cardiac Monitoring: None  Code Status: Full Code      Clinically Significant Risk Factors                        # Overweight: Estimated body mass index is 27.12 kg/m  as calculated from the following:    Height as of this encounter: 1.626 m (5' 4.02\").    Weight as of this encounter: 71.7 kg (158 lb 1.6 oz).          Disposition Plan      Expected Discharge Date: 01/16/2023      Destination: home with family  Discharge Comments: Dispo: Home  Delay: increasing O2 requirements 1/12, Pain management plan (IV pain meds)  Progress: weaning O2 and improvement in pain          Zaynab Madison MD  Hospitalist Service, GOLD TEAM 11  [facility]  Securely message with [employer] (more info)  Text page via [employer] " Paging/Directory   See signed in provider for up to date coverage information  ______________________________________________________________________    Interval History   No acute events overnight. Patient on 2-3L NC this morning. Will repeat dose of lasix and encourage ambulation/out of bed activity to continue to work on improving lung aeration and hopefully decrease supplemental oxygen use. Patient not wanting to talk this AM, just wanting to sleep. Will check back in afternoon for any progress.     Physical Exam   Vital Signs: Temp: 97.9  F (36.6  C) Temp src: Oral BP: 115/66 Pulse: 93   Resp: 16 SpO2: 95 % O2 Device: Nasal cannula Oxygen Delivery: 2 LPM  Weight: 158 lbs 1.6 oz    General: alert & oriented, laying in bed w/ sheets pulled over head, MMM, EOMI w/o scleral icterus, neck FROM, pleasant   Ear (1/14): Blt TMs visualized w/o signs of infection, minimal ear wax present in canal.   Resp: CTAB w/o focal findings w/ slightly decreased aeration in blt lung bases (R>L), no increased WOB, on NC  GI: soft, pain in R.flank area (improved)  Skin: warm and well perfused  Neuro/MSK:  moving all 4 extremities (R.UE w/ minor residual deficit/contraction s/p CVA), able to ambulate up to bathroom independantly    Medical Decision Making       40 MINUTES SPENT BY ME on the date of service doing chart review, history, exam, documentation & further activities per the note.      Data   ------------------------- PAST 24 HR DATA REVIEWED -----------------------------------------------    I have personally reviewed the following data over the past 24 hrs:    10.7  \   6.8 (LL)   / 480 (H)     138 100 5.7 (L) /  87   3.6 22 0.54 \       ALT: 17 AST: 36 (H) AP: 73 TBILI: 1.8 (H)   ALB: 4.0 TOT PROTEIN: 7.4 LIPASE: N/A       Ferritin:  N/A % Retic:  13.3 (H) LDH:  N/A       Imaging results reviewed over the past 24 hrs:   No results found for this or any previous visit (from the past 24 hour(s)).

## 2023-01-16 VITALS
WEIGHT: 151.9 LBS | OXYGEN SATURATION: 97 % | HEART RATE: 92 BPM | HEIGHT: 64 IN | RESPIRATION RATE: 16 BRPM | DIASTOLIC BLOOD PRESSURE: 53 MMHG | TEMPERATURE: 98 F | BODY MASS INDEX: 25.93 KG/M2 | SYSTOLIC BLOOD PRESSURE: 111 MMHG

## 2023-01-16 LAB
ALBUMIN SERPL BCG-MCNC: 3.9 G/DL (ref 3.5–5.2)
ALP SERPL-CCNC: 70 U/L (ref 35–104)
ALT SERPL W P-5'-P-CCNC: 18 U/L (ref 10–35)
ANION GAP SERPL CALCULATED.3IONS-SCNC: 13 MMOL/L (ref 7–15)
AST SERPL W P-5'-P-CCNC: 33 U/L (ref 10–35)
BILIRUB DIRECT SERPL-MCNC: 0.63 MG/DL (ref 0–0.3)
BILIRUB SERPL-MCNC: 3.2 MG/DL
BUN SERPL-MCNC: 10.3 MG/DL (ref 6–20)
CALCIUM SERPL-MCNC: 9.2 MG/DL (ref 8.6–10)
CHLORIDE SERPL-SCNC: 101 MMOL/L (ref 98–107)
CREAT SERPL-MCNC: 0.51 MG/DL (ref 0.51–0.95)
DEPRECATED HCO3 PLAS-SCNC: 22 MMOL/L (ref 22–29)
ERYTHROCYTE [DISTWIDTH] IN BLOOD BY AUTOMATED COUNT: 21.9 % (ref 10–15)
GFR SERPL CREATININE-BSD FRML MDRD: >90 ML/MIN/1.73M2
GLUCOSE SERPL-MCNC: 94 MG/DL (ref 70–99)
HCT VFR BLD AUTO: 17.5 % (ref 35–47)
HGB BLD-MCNC: 5.9 G/DL (ref 11.7–15.7)
MCH RBC QN AUTO: 27.7 PG (ref 26.5–33)
MCHC RBC AUTO-ENTMCNC: 33.7 G/DL (ref 31.5–36.5)
MCV RBC AUTO: 82 FL (ref 78–100)
PLATELET # BLD AUTO: 485 10E3/UL (ref 150–450)
POTASSIUM SERPL-SCNC: 3.7 MMOL/L (ref 3.4–5.3)
PROT SERPL-MCNC: 7.1 G/DL (ref 6.4–8.3)
RBC # BLD AUTO: 2.13 10E6/UL (ref 3.8–5.2)
RETICS # AUTO: 0.27 10E6/UL (ref 0.03–0.1)
RETICS/RBC NFR AUTO: 12.5 % (ref 0.5–2)
SODIUM SERPL-SCNC: 136 MMOL/L (ref 136–145)
WBC # BLD AUTO: 13.3 10E3/UL (ref 4–11)

## 2023-01-16 PROCEDURE — 86901 BLOOD TYPING SEROLOGIC RH(D): CPT | Performed by: PEDIATRICS

## 2023-01-16 PROCEDURE — 250N000013 HC RX MED GY IP 250 OP 250 PS 637: Performed by: PEDIATRICS

## 2023-01-16 PROCEDURE — 99239 HOSP IP/OBS DSCHRG MGMT >30: CPT | Performed by: INTERNAL MEDICINE

## 2023-01-16 PROCEDURE — 80053 COMPREHEN METABOLIC PANEL: CPT | Performed by: STUDENT IN AN ORGANIZED HEALTH CARE EDUCATION/TRAINING PROGRAM

## 2023-01-16 PROCEDURE — 250N000013 HC RX MED GY IP 250 OP 250 PS 637: Performed by: INTERNAL MEDICINE

## 2023-01-16 PROCEDURE — 250N000013 HC RX MED GY IP 250 OP 250 PS 637: Performed by: STUDENT IN AN ORGANIZED HEALTH CARE EDUCATION/TRAINING PROGRAM

## 2023-01-16 PROCEDURE — 250N000011 HC RX IP 250 OP 636: Performed by: INTERNAL MEDICINE

## 2023-01-16 PROCEDURE — 82248 BILIRUBIN DIRECT: CPT | Performed by: HOSPITALIST

## 2023-01-16 PROCEDURE — 36591 DRAW BLOOD OFF VENOUS DEVICE: CPT | Performed by: STUDENT IN AN ORGANIZED HEALTH CARE EDUCATION/TRAINING PROGRAM

## 2023-01-16 PROCEDURE — 85027 COMPLETE CBC AUTOMATED: CPT | Performed by: PEDIATRICS

## 2023-01-16 PROCEDURE — 85045 AUTOMATED RETICULOCYTE COUNT: CPT | Performed by: PEDIATRICS

## 2023-01-16 RX ORDER — OXYCODONE HYDROCHLORIDE 20 MG/1
20 TABLET ORAL EVERY 4 HOURS PRN
Qty: 40 TABLET | Refills: 0 | Status: SHIPPED | OUTPATIENT
Start: 2023-01-16 | End: 2023-01-19

## 2023-01-16 RX ORDER — FOLIC ACID 1 MG/1
1 TABLET ORAL DAILY
Qty: 30 TABLET | Refills: 0 | Status: SHIPPED | OUTPATIENT
Start: 2023-01-16 | End: 2023-05-26

## 2023-01-16 RX ADMIN — FOLIC ACID 1 MG: 1 TABLET ORAL at 08:01

## 2023-01-16 RX ADMIN — HYDROMORPHONE HYDROCHLORIDE 0.5 MG: 1 INJECTION, SOLUTION INTRAMUSCULAR; INTRAVENOUS; SUBCUTANEOUS at 03:13

## 2023-01-16 RX ADMIN — DICLOFENAC SODIUM 4 G: 10 GEL TOPICAL at 13:58

## 2023-01-16 RX ADMIN — Medication 5 ML: at 16:42

## 2023-01-16 RX ADMIN — HYDROMORPHONE HYDROCHLORIDE 0.5 MG: 1 INJECTION, SOLUTION INTRAMUSCULAR; INTRAVENOUS; SUBCUTANEOUS at 08:00

## 2023-01-16 RX ADMIN — HYDROXYUREA 3000 MG: 500 CAPSULE ORAL at 08:00

## 2023-01-16 RX ADMIN — HYDROMORPHONE HYDROCHLORIDE 0.5 MG: 1 INJECTION, SOLUTION INTRAMUSCULAR; INTRAVENOUS; SUBCUTANEOUS at 12:45

## 2023-01-16 RX ADMIN — FLUOXETINE 10 MG: 10 CAPSULE ORAL at 08:02

## 2023-01-16 RX ADMIN — METHOCARBAMOL 500 MG: 500 TABLET ORAL at 13:51

## 2023-01-16 RX ADMIN — SENNOSIDES AND DOCUSATE SODIUM 2 TABLET: 8.6; 5 TABLET ORAL at 08:03

## 2023-01-16 RX ADMIN — ASPIRIN 81 MG: 81 TABLET, CHEWABLE ORAL at 08:06

## 2023-01-16 RX ADMIN — DICLOFENAC SODIUM 4 G: 10 GEL TOPICAL at 08:04

## 2023-01-16 RX ADMIN — DICLOFENAC SODIUM 4 G: 10 GEL TOPICAL at 16:22

## 2023-01-16 RX ADMIN — METHOCARBAMOL 500 MG: 500 TABLET ORAL at 08:03

## 2023-01-16 ASSESSMENT — ACTIVITIES OF DAILY LIVING (ADL)
ADLS_ACUITY_SCORE: 19
ADLS_ACUITY_SCORE: 22
ADLS_ACUITY_SCORE: 19
ADLS_ACUITY_SCORE: 22
ADLS_ACUITY_SCORE: 19
ADLS_ACUITY_SCORE: 22

## 2023-01-16 NOTE — PROGRESS NOTES
Called with Hgb of 5.9  On chart review, she's had a Hgb of 6.5 on 1/11 with daily steady/ progressive decline till date except on 1/15 w/o intervention. ?Lab error rather than a true drop ogf Hgb from 6.8 to 5.9  However, her bili is increased today to 3.2. So, I added a direct bili. But no dramatic increase in retic count  On speaking with RN, no new CP/dyspnea. In fact her O2 needs have been declining overnight  Spoke w heme fellow who stated to hold off on any transfusion til discussing with day hematology team

## 2023-01-16 NOTE — PROGRESS NOTES
Neuro: A&Ox4.   Cardiac: SR. VSS.   Respiratory: At beginning of shift pt was on 3-4 L with oxygen saturation in lower 90's. Pt took a walk with RN and according to oxgen monitor pt would drop to mid 70's when on RA, pt was asymptomatic. This assessment was with pulse oximeter on finger (infant type).   Pt's pulse oximeter was changed to ear probe and was noted to be 99% when laying in bed on RA. Later the patient went on an another walk with her oxygen saturation monitored and was able walk on RA with oxygen saturation at 90%, on 0.5-1 L the patient stayed at 93-96% while on walk.     GI/: Adequate urine output. BM X1  Diet/appetite: Appetite increasing. Ate 1 meal.   Activity:  independent, up to chair and in halls x2  Pain: At acceptable level on current regimen  Skin: No new deficits noted.      Plan: Continue with POC. Notify primary team with changes.

## 2023-01-16 NOTE — DISCHARGE SUMMARY
[facility]  Hospitalist Discharge Summary      Date of Admission:  1/8/2023  Date of Discharge:  1/16/2023  Discharging Provider: Zaynab Madison MD  Discharge Service: Hospitalist Service, GOLD TEAM 11    Discharge Diagnoses   # Acute Hypoxic Respiratory Failure  # Acute Chest Syndrome  # Acute Pulmonary Edema  #AcuteSickle Cell Vaso-occlusive Pain Crisis  #Hx Sickle Cell (SS) with hx CVA, acute chest pulm htn, chronic PE  #R. Ear Pain    Follow-ups Needed After Discharge   Hematology team to call you to schedule follow up.   If you start feeling symptoms of fatigue, increasing shortness of breath, dizziness/lightheadedness; then please reach out to your hematology clinic ([phone])      Discharge Disposition   Discharged to home  Condition at discharge: Stable    Hospital Course   Jennifer Cervantes is a 22 year old female with a past medical history including Hb-SS disease, sickle cell pain crises, chronic PE without acute cor pulmonale, acute chest syndrome, and right sided hemiplegia and hemiparesis following remote cerebral infarction now admitted for management of acute chest wall pain and possible acute chest syndrome.     # Acute Hypoxic Respiratory Failure- Asymptomatic: improving  # Acute Chest Syndrome  # Acute Pulmonary Edema  Hypoxia gradually worsening with need for increasing supplement O2. Patient remains asymptomatic during this episodes, except for tachycardia to 110s. CXR w/ increasing pulmonary edema and atelectasis. Patient w/o cough/congestion/fevers/other sick symptoms.    - supplemental O2 w/ goal sats >90%  - Diuresis: Lasix IV 20mg spot doses  - Abx: s/p ceftriaxone/doxycycline x8d  - breathing treatment: prn albuterol  - IS q1h (patient to set timed alarm for reminder while awake), encourage out of bed/ambulation  - Hgb stable-low. Patient received no transfusions during hospital stay per hematology     #AcuteSickle Cell Vaso-occlusive Pain  Crisis  #Hx Sickle Cell (SS) with hx CVA, acute chest pulm htn, chronic PE  Presented with acute onset chest wall pain consistent with previous vasoocclusive pain. Home pain management with oxycodone 15mg q4h PRN, has been taking more or less scheduled recently due to pain being worse in winter.  Unable to have acute pain infusion appt Friday 1/6.  Care plan in place followed in ED, will initiate inpatient care per pain plan.  Discussed with hematology, severe pain when evaluated, reasonable to give IV pain control consistent with outpatient infusion plan, then transition to inpatient plan with oral oxy, ketamine, ASA. Ketorolac has also been helpful, will continue. Regarding acute chest, hypoxia with white count and streaky atelectasis in ED, neg VQ scan, breathing comfortably with non-focal exam, no tachypnea or increased work of breathing, denies respiratory symptoms on my exam; persistent O2 demand, slowly weaning.  - Pain mgmt per inpatient care plan:    - s/p ketamine gtt at 6mg/hr(previously felt bad at 8mg/hr)    - Opiates: Oxycodone 20mg q4h PRN or Dilaudid 1mg q4h prn   - Voltaren gel PRN, Robaxin 500mg TID   - Continue PTA ASA   - s/p Ketorolac 30mg q6h  - Bowel regimen: Senna/docusate and miralax  - Continue PTA hydroxyuria and folic acid  - Hold deferasirox pending heme- to resume outpatient per hematologist    R. Ear Pain:   TM exam on 1/14 revealed nm appearance of R.TM w/o signs of infection and symmetric appearance to L. TM. No abx needed at this time. Will continue to monitor    #Hyponatremia- resolved:   - Poor PO intake, corrected s/p fluid resuscitation     #Hx of CVA: continue PTA ASA BID  #Hx asthma: cont PTA budesonide/formoterol, PRN albuterol  #Depression: cont PTA prozac, health psych consulted.     Consultations This Hospital Stay   CARE MANAGEMENT / SOCIAL WORK IP CONSULT  HEMATOLOGY ADULT IP CONSULT  NURSING TO CONSULT FOR VASCULAR ACCESS CARE IP CONSULT    Code Status   Full  Code    Time Spent on this Encounter   I, Zaynab Madison MD, personally saw the patient today and spent greater than 30 minutes discharging this patient.       Zaynab Madison MD  [facility] [address] [address]  Phone: [phone]  ______________________________________________________________________    Physical Exam   Vital Signs: Temp: 98  F (36.7  C) Temp src: Oral BP: 111/53 Pulse: 92   Resp: 16 SpO2: 97 % O2 Device: Nasal cannula Oxygen Delivery: 1 LPM  Weight: 151 lbs 14.4 oz  General: alert & oriented, laying in bed w/ sheets pulled over head, MMM, EOMI w/o scleral icterus, neck FROM, pleasant   Ear (1/14): Blt TMs visualized w/o signs of infection, minimal ear wax present in canal.   Resp: CTAB w/o focal findings w/ slightly decreased aeration in blt lung bases (R>L), no increased WOB, on RA  GI: soft, pain in R.flank area (improved)  Skin: warm and well perfused  Neuro/MSK:  moving all 4 extremities (R.UE w/ minor residual deficit/contraction s/p CVA), able to ambulate up to bedside commode independantly       Primary Care Physician   Suraj Case    Discharge Orders      Reason for your hospital stay    Acute Hypoxic Respiratory Failure  Acute Chest Syndrome  Sickle Cell Pain Crisis     Activity    Your activity upon discharge: activity as tolerated     Adult [facility] Follow-up and recommended labs and tests    Please follow up with your hematology team w/ any concerns. Recommend getting labs checked in ###: CBC, retics, CMP w/ bilirubin    Appointments on [place] and/or [facility] (with [facility] or [facility] provider or service). Call [phone] if you haven't heard regarding these appointments within 7 days of discharge.     Full Code     Diet    Follow this diet upon discharge: Orders Placed This Encounter      Regular Diet Adult       Significant Results and Procedures       Discharge Medications   Current Discharge Medication List      START taking these  medications    Details   diclofenac (VOLTAREN) 1 % topical gel Apply 4 g topically 4 times daily  Qty: 350 g, Refills: 0    Associated Diagnoses: Hypoxia; Sickle cell crisis (H)      folic acid (FOLVITE) 1 MG tablet Take 1 tablet (1 mg) by mouth daily  Qty: 30 tablet, Refills: 0    Associated Diagnoses: Hypoxia; Sickle cell crisis (H)      oxyCODONE IR (ROXICODONE) 20 MG TABS immediate release tablet Take 20 mg by mouth every 4 hours as needed for moderate pain (4-6)  Qty: 40 tablet, Refills: 0    Associated Diagnoses: Hypoxia; Sickle cell crisis (H)         CONTINUE these medications which have NOT CHANGED    Details   acetaminophen (TYLENOL) 325 MG tablet Take 2 tablets (650 mg) by mouth every 6 hours as needed for mild pain  Qty: 120 tablet, Refills: 3    Associated Diagnoses: Sickle cell crisis (H)      albuterol (PROVENTIL) (2.5 MG/3ML) 0.083% neb solution Take 2 vials (5 mg) by nebulization every 6 hours as needed for shortness of breath / dyspnea or wheezing  Qty: 90 mL, Refills: 3    Associated Diagnoses: Asthmatic bronchitis without complication, unspecified asthma severity, unspecified whether persistent      aspirin (ASA) 81 MG chewable tablet Take 1 tablet (81 mg) by mouth 2 times daily  Qty: 60 tablet, Refills: 11    Associated Diagnoses: Superficial venous thrombosis of arm, right      budesonide-formoterol (SYMBICORT) 160-4.5 MCG/ACT Inhaler Inhale 2 puffs into the lungs 2 times daily  Qty: 10.2 g, Refills: 3    Associated Diagnoses: Asthmatic bronchitis without complication, unspecified asthma severity, unspecified whether persistent      deferasirox (JADENU) 360 MG tablet Take 4 tablets (1,440 mg) by mouth every evening  Qty: 120 tablet, Refills: 4    Associated Diagnoses: Iron overload due to repeated red blood cell transfusions      FLUoxetine (PROZAC) 10 MG capsule Take 1 capsule (10 mg) by mouth daily For two weeks, then increase to 20 mg if 10 mg not effective  Qty: 40 capsule, Refills: 1     Associated Diagnoses: Anxiety      Hydroxyurea 1000 MG TABS Take 3,000 mg by mouth daily  Qty: 90 tablet, Refills: 3    Associated Diagnoses: 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)      ondansetron (ZOFRAN) 8 MG tablet Take 1 tablet (8 mg) by mouth every 8 hours as needed  Qty: 30 tablet, Refills: 1    Associated Diagnoses: Other acute gastritis without hemorrhage      traZODone (DESYREL) 50 MG tablet Take 1 tablet (50 mg) by mouth At Bedtime  Qty: 30 tablet, Refills: 1    Associated Diagnoses: Persistent insomnia      EPINEPHrine (ANY BX GENERIC EQUIV) 0.3 MG/0.3ML injection 2-pack Inject 0.3 mLs (0.3 mg) into the muscle as needed for anaphylaxis  Qty: 1 each, Refills: 1    Associated Diagnoses: Anaphylaxis, sequela           Allergies   Allergies   Allergen Reactions     Contrast Dye      Hives and breathing issues     Fish-Derived Products Hives     Seafood Hives     Diagnostic X-Ray Materials      Gadolinium

## 2023-01-16 NOTE — DISCHARGE INSTRUCTIONS
Dear Jennifer Cervantes    You were hospitalized at [facility] with sickle cell pain crisis and acute hypoxia secondary to pulmonary edema and treated with IV pain medication, oxygen, IV antibiotics, IV diuretics.  Over this hospitalization your symptoms improved and today you are ready to be discharged.      Please set up an appointment with:  Hematology team to call you to schedule follow up.   If you start feeling symptoms of fatigue, increasing shortness of breath, dizziness/lightheadedness; then please reach out to your hematology clinic ([phone])      It was a pleasure meeting with you today. Thank you for allowing me and my team the privilege of caring for you. Take care!

## 2023-01-16 NOTE — PLAN OF CARE
Cares from: [id number]     V/S & pain: VSS on RA, ex tachy, slight relief of pain w/ PRN IV dilaudid, scheduled robaxin and voltaren gel   Neuro: A/O x4, calm and cooperative   Respiratory: stable on RA, continuous pulse ox monitoring to the desk, slight ORTEGA w/ clear/equal lung sounds bilaterally   Skin: no skin concerns present ex pt refused full skin assessment, RUE flexion/contracted at baseline   GI/: voiding spontaneously, no BM    Nutrition: regular diet w/ fair appetite and adequate po intake  Lines/drains: L port SL locked- needle/dressing changed yesterday by float RN  Activity: up sba in room   Labs: continue monitoring HGB level, no RN managed labs      Events this shift: no acute events this shift, pt slept well b/t cares. A/O x4. Up sba in room. Regular diet w/ fair appetite. L port heparin locked, needle/dressing changed during shift.  Stating well on RA most of the shift, put on 1L around 0100 for oxygen sats at 87, and oxygen jumped up right away. Pt was able to transition back on RA shortly after and only intermittently needed 1L after that. Pt currently on 1L NC. No SOB but some ORTEGA. PRN pain medications given w/ slight relief. Critical hemoglobin this morning, MD aware and planning to consult hematology for further recommendations. call light w/in reach and able to make needs known, will continue to monitor.     Plan: continue w/ poc, possible home discharge today    Goal Outcome Evaluation:      Plan of Care Reviewed With: patient    Overall Patient Progress: no changeOverall Patient Progress: no change    Outcome Evaluation: pain management, room air most of shift

## 2023-01-16 NOTE — PROVIDER NOTIFICATION
Provider notified (name): PJ Orantes MD   Reason for notification: can you please order heparin lock flushes for port. needle is about to be changed out.   Response from provider: orders placed

## 2023-01-16 NOTE — PROVIDER NOTIFICATION
Critical Test Results/Notification    Critical lab result (name and value): hemoglobin 5.9  What time did lab notify you? 0644  What provider did you notify? V. Ashish   Was the provider notified within 30 min? yes  Why was provider not notified? N/A    Response from provider: will consult hematology for further recommendations and update w/ response

## 2023-01-17 ENCOUNTER — PATIENT OUTREACH (OUTPATIENT)
Dept: CARE COORDINATION | Facility: CLINIC | Age: 23
End: 2023-01-17
Payer: COMMERCIAL

## 2023-01-17 ENCOUNTER — PATIENT OUTREACH (OUTPATIENT)
Dept: ONCOLOGY | Facility: CLINIC | Age: 23
End: 2023-01-17
Payer: COMMERCIAL

## 2023-01-17 LAB
ABO/RH(D): NORMAL
ANTIBODY SCREEN: NEGATIVE
SPECIMEN EXPIRATION DATE: NORMAL

## 2023-01-17 NOTE — PROGRESS NOTES
Patient discharged on 1/16/23, please follow up per TCM workflow.    Shravan Flores on 1/17/2023 at 7:16 AM

## 2023-01-17 NOTE — PROGRESS NOTES
Clinic Care Coordination Contact  [facility]: Post-Discharge Note  SITUATION                                                      Admission:    Admission Date: 01/08/23   Reason for Admission: # Acute Hypoxic Respiratory Failure  # Acute Chest Syndrome  # Acute Pulmonary Edema  #AcuteSickle Cell Vaso-occlusive Pain Crisis  #Hx Sickle Cell (SS) with hx CVA, acute chest pulm htn, chronic PE  #R. Ear Pain  Discharge:   Discharge Date: 01/16/23  Discharge Diagnosis: # Acute Hypoxic Respiratory Failure  # Acute Chest Syndrome  # Acute Pulmonary Edema  #AcuteSickle Cell Vaso-occlusive Pain Crisis  #Hx Sickle Cell (SS) with hx CVA, acute chest pulm htn, chronic PE  #R. Ear Pain    BACKGROUND                                                      Per hospital discharge summary and inpatient provider notes:    Jennifer Cervantes is a 22 year old female with a past medical history including Hb-SS disease, sickle cell pain crisis, chronic PE without acute cor pulmonale, acute chest syndrome, right sided hemiplegia and hemiparesis following remote cerebral infarction who presented to the with chest wall pain for the last day.  She reported that right-sided lower chest started last night at around 9 PM while she was resting.  Unable ot manage pain at home with heat/cold packs and PO oxycodone, was not able to increase frequency of dosing as she was already taking 15mg q4h, which his her max dose.  Pain feels similar to previous pain crises. Patient denies dyspnea or fever, no wheezing, no inhaler use. Denies dysuria, hematuria or any other urinary symptoms. Normal bowel movements, denies constipation. She reports she does have pain crises associated with cold weather.  She has 2x/week PRN outpatient infusions with morphine/IVFs, last on 1/2, attempted to schedule appointment on Friday, 1/6 but was unable to get in, instead scheduled for 1/9. L  last exchange transfusion during her last hospitalization in September 2022.      In  "the ED, she was managed per care coordination note: fluid bolus 500mL, ketorolac 15mg, ketamine 4mg x2, and oxycodone 20mg.  She was found to be hypoxic to 87% and placed on 3L NC with improvement to 90s.  Labs notable for WBC 20 (ANC 17), Hgb 7.3 (BL ~7-8), bili 3.0, CO2 16; CXR showed streaky opacity.  Given hypoxia, CXR, and leukocytosis, she was started on CTX/doxy for acute chest syndrome, and admitted to medicine for further evaluation and management.     ASSESSMENT      Discharge Assessment  How are you doing now that you are home?: \"Doing better\"  How are your symptoms? (Red Flag symptoms escalate to triage hotline per guidelines): Improved  Do you feel your condition is stable enough to be safe at home until your provider visit?: Yes  Does the patient have their discharge instructions? : Yes  Does the patient have questions regarding their discharge instructions? : No  Were you started on any new medications or were there changes to any of your previous medications? : Yes  Does the patient have all of their medications?: Yes  Do you have questions regarding any of your medications? : No  Do you have all of your needed medical supplies or equipment (DME)?  (i.e. oxygen tank, CPAP, cane, etc.): Yes  Discharge follow-up appointment scheduled within 14 calendar days? : No  Is patient agreeable to assistance with scheduling? : No    Post-op (CHW CTA Only)  If the patient had a surgery or procedure, do they have any questions for a nurse?: No    PLAN                                                      Outpatient Plan:    Hematology team to call you to schedule follow up.   If you start feeling symptoms of fatigue, increasing shortness of breath, dizziness/lightheadedness; then please reach out to your hematology clinic ([phone])    Future Appointments   Date Time Provider Department Center   1/26/2023  9:00 AM Katherine Pierce MD [facility]   2/2/2023  8:10 AM Anai Franco MD [place] [facility]   3/20/2023  9:00 AM Nurse, [facility]         For any urgent concerns, please contact our 24 hour nurse triage line: [phone] ([phone])         KHALIDA Dominguez  [phone]  [facility]

## 2023-01-17 NOTE — PLAN OF CARE
Goal Outcome Evaluation: Adequate for care transition.      Plan of Care Reviewed With: patient    Overall Patient Progress: improvingOverall Patient Progress: improving    Outcome Evaluation: Pain well managed on oral medications, oxygen sats low but stable.    Discharged to:  Home  Transportation: Taxi  Time: 1700 hrs  Prescriptions: To be picked up at discharge pharmacy   Belongings: Remain with patient   PIV/Access: Port heparin locked and de-accessed.  Care Plan and Education discontinued: Yes  Paperwork: Reviewed and questions answered, attestation signed.

## 2023-01-18 ENCOUNTER — PATIENT OUTREACH (OUTPATIENT)
Dept: CARE COORDINATION | Facility: CLINIC | Age: 23
End: 2023-01-18
Payer: COMMERCIAL

## 2023-01-18 ENCOUNTER — TELEPHONE (OUTPATIENT)
Dept: ONCOLOGY | Facility: CLINIC | Age: 23
End: 2023-01-18
Payer: COMMERCIAL

## 2023-01-18 ENCOUNTER — LAB (OUTPATIENT)
Dept: LAB | Facility: CLINIC | Age: 23
End: 2023-01-18
Attending: PEDIATRICS
Payer: COMMERCIAL

## 2023-01-18 DIAGNOSIS — D57.00 SICKLE CELL PAIN CRISIS (H): ICD-10-CM

## 2023-01-18 DIAGNOSIS — D57.00 SICKLE CELL PAIN CRISIS (H): Primary | ICD-10-CM

## 2023-01-18 LAB
ABO/RH(D): NORMAL
ALBUMIN SERPL BCG-MCNC: 4.2 G/DL (ref 3.5–5.2)
ALP SERPL-CCNC: 76 U/L (ref 35–104)
ALT SERPL W P-5'-P-CCNC: 18 U/L (ref 10–35)
ANION GAP SERPL CALCULATED.3IONS-SCNC: 10 MMOL/L (ref 7–15)
ANTIBODY SCREEN: NEGATIVE
AST SERPL W P-5'-P-CCNC: 29 U/L (ref 10–35)
BASOPHILS # BLD AUTO: 0.2 10E3/UL (ref 0–0.2)
BASOPHILS NFR BLD AUTO: 1 %
BILIRUB SERPL-MCNC: 3.4 MG/DL
BLD PROD TYP BPU: NORMAL
BLOOD COMPONENT TYPE: NORMAL
BUN SERPL-MCNC: 4.5 MG/DL (ref 6–20)
CALCIUM SERPL-MCNC: 9.1 MG/DL (ref 8.6–10)
CHLORIDE SERPL-SCNC: 105 MMOL/L (ref 98–107)
CODING SYSTEM: NORMAL
CREAT SERPL-MCNC: 0.47 MG/DL (ref 0.51–0.95)
CROSSMATCH: NORMAL
DEPRECATED HCO3 PLAS-SCNC: 21 MMOL/L (ref 22–29)
EOSINOPHIL # BLD AUTO: 0.2 10E3/UL (ref 0–0.7)
EOSINOPHIL NFR BLD AUTO: 1 %
ERYTHROCYTE [DISTWIDTH] IN BLOOD BY AUTOMATED COUNT: 25.1 % (ref 10–15)
GFR SERPL CREATININE-BSD FRML MDRD: >90 ML/MIN/1.73M2
GLUCOSE SERPL-MCNC: 111 MG/DL (ref 70–99)
HCT VFR BLD AUTO: 18.6 % (ref 35–47)
HGB BLD-MCNC: 6.3 G/DL (ref 11.7–15.7)
IMM GRANULOCYTES # BLD: 0.1 10E3/UL
IMM GRANULOCYTES NFR BLD: 1 %
ISSUE DATE AND TIME: NORMAL
LYMPHOCYTES # BLD AUTO: 2.3 10E3/UL (ref 0.8–5.3)
LYMPHOCYTES NFR BLD AUTO: 14 %
MCH RBC QN AUTO: 26.5 PG (ref 26.5–33)
MCHC RBC AUTO-ENTMCNC: 33.9 G/DL (ref 31.5–36.5)
MCV RBC AUTO: 78 FL (ref 78–100)
MONOCYTES # BLD AUTO: 1.2 10E3/UL (ref 0–1.3)
MONOCYTES NFR BLD AUTO: 7 %
NEUTROPHILS # BLD AUTO: 13 10E3/UL (ref 1.6–8.3)
NEUTROPHILS NFR BLD AUTO: 76 %
NRBC # BLD AUTO: 0.4 10E3/UL
NRBC BLD AUTO-RTO: 2 /100
PLATELET # BLD AUTO: 694 10E3/UL (ref 150–450)
POTASSIUM SERPL-SCNC: 3.4 MMOL/L (ref 3.4–5.3)
PROT SERPL-MCNC: 7.6 G/DL (ref 6.4–8.3)
RBC # BLD AUTO: 2.38 10E6/UL (ref 3.8–5.2)
RETICS # AUTO: 0.42 10E6/UL (ref 0.03–0.1)
RETICS/RBC NFR AUTO: 17.8 % (ref 0.5–2)
SODIUM SERPL-SCNC: 136 MMOL/L (ref 136–145)
SPECIMEN EXPIRATION DATE: NORMAL
UNIT ABO/RH: NORMAL
UNIT NUMBER: NORMAL
UNIT STATUS: NORMAL
UNIT TYPE ISBT: 9500
WBC # BLD AUTO: 17 10E3/UL (ref 4–11)

## 2023-01-18 PROCEDURE — 36415 COLL VENOUS BLD VENIPUNCTURE: CPT

## 2023-01-18 PROCEDURE — 86923 COMPATIBILITY TEST ELECTRIC: CPT | Performed by: PEDIATRICS

## 2023-01-18 PROCEDURE — 250N000011 HC RX IP 250 OP 636: Performed by: PEDIATRICS

## 2023-01-18 PROCEDURE — 85045 AUTOMATED RETICULOCYTE COUNT: CPT

## 2023-01-18 PROCEDURE — 86901 BLOOD TYPING SEROLOGIC RH(D): CPT

## 2023-01-18 PROCEDURE — 85025 COMPLETE CBC W/AUTO DIFF WBC: CPT

## 2023-01-18 PROCEDURE — 80053 COMPREHEN METABOLIC PANEL: CPT

## 2023-01-18 RX ORDER — HEPARIN SODIUM (PORCINE) LOCK FLUSH IV SOLN 100 UNIT/ML 100 UNIT/ML
500 SOLUTION INTRAVENOUS ONCE
Status: COMPLETED | OUTPATIENT
Start: 2023-01-18 | End: 2023-01-18

## 2023-01-18 RX ORDER — HEPARIN SODIUM (PORCINE) LOCK FLUSH IV SOLN 100 UNIT/ML 100 UNIT/ML
5 SOLUTION INTRAVENOUS
Status: CANCELLED | OUTPATIENT
Start: 2023-01-18

## 2023-01-18 RX ORDER — HEPARIN SODIUM,PORCINE 10 UNIT/ML
5 VIAL (ML) INTRAVENOUS
Status: CANCELLED | OUTPATIENT
Start: 2023-01-18

## 2023-01-18 RX ADMIN — Medication 500 UNITS: at 16:04

## 2023-01-18 NOTE — PROGRESS NOTES
Community Health Worker Note: Telephone Call  Oncology Clinic     Data/Intervention:  Patient Name:  Jennifer Cervantes DOB/Age: [birth year]     Call From: Triage Nurse        Reason for Call:  Transportation      Assessment:  CHW arranged ride through [employer] for todays appointment 2023 at 4:15 as a round trip with a will call return ride home.  CHW also called [employer]  ([phone] )  to arrange ride through patient's insurance.  [employer] arranged a round trip ride  at 9:30am and  a will call return for her 2023 appointment at 10:30 am.  Patient will need to call when ready for return ride home [phone]. Talked to patient and she will call [employer] to setup  time.     Plan:  Patient is aware of the transportation plan. /CHW available to assist with any other needs.      Isaura OTTO Community Health Worker  Clinic Care Coordination  [facility]  Phone: [phone]

## 2023-01-18 NOTE — TELEPHONE ENCOUNTER
1/16/23 Hgb was 5.9   Today calls in to triage to state that she is feeling weak and tired.  Denies SOB.   Home care nurse was out and HR was 88.   No fevers, No cough. No problems with urination.   Wondering if she needs a blood transfusion?   Per therapy plan paged     2:33 Paged Dr Duncan   2:36 Per Dr Duncan OK for blood transfusion   Ok for IVF/pain meds with blood transfusion if patient chooses to have on same day.     2:46 Dr Duncan paged and returned call OK to give 1 unit of blood and pain meds no extra fluids.     Message sent to infusion nurses.     BMT can take tomorrow at 10:30 if she can get type and cross done today     Lab appt at 4:15 today and tomorrow an appt at 10:30 for blood and pain meds only no IVF.     Message sent to scheduling to schedule lab appt today and blood and pain meds tomorrow at 10:30 with BMT.     Call placed to Jennifer that social work was setting up ride for lab appt at 4:15 today and also ride for 10:30 appt tomorrow for blood and pain meds. She states that those appt times work for her and that she is appreciative of social work setting up rides.     Social work states ride tomoorow with  [employer] at 9:30 am and that she should call them when she has completed her transfusion.

## 2023-01-18 NOTE — NURSING NOTE
Chief Complaint   Patient presents with     Port Draw     Labs drawn by RN via port.     Port accessed with needle placed previously, labs collected, line flushed with saline and heparin.      Carly Ellis RN

## 2023-01-19 ENCOUNTER — INFUSION THERAPY VISIT (OUTPATIENT)
Dept: TRANSPLANT | Facility: CLINIC | Age: 23
End: 2023-01-19
Attending: PEDIATRICS
Payer: COMMERCIAL

## 2023-01-19 VITALS
TEMPERATURE: 98.2 F | RESPIRATION RATE: 18 BRPM | SYSTOLIC BLOOD PRESSURE: 117 MMHG | HEART RATE: 83 BPM | OXYGEN SATURATION: 93 % | DIASTOLIC BLOOD PRESSURE: 78 MMHG

## 2023-01-19 DIAGNOSIS — D57.00 SICKLE CELL PAIN CRISIS (H): ICD-10-CM

## 2023-01-19 DIAGNOSIS — D57.1 HB-SS DISEASE WITHOUT CRISIS (H): Primary | ICD-10-CM

## 2023-01-19 DIAGNOSIS — Z86.73 HISTORY OF STROKE: ICD-10-CM

## 2023-01-19 DIAGNOSIS — G81.10 SPASTIC HEMIPLEGIA, UNSPECIFIED ETIOLOGY, UNSPECIFIED LATERALITY (H): ICD-10-CM

## 2023-01-19 DIAGNOSIS — R09.02 HYPOXIA: ICD-10-CM

## 2023-01-19 DIAGNOSIS — D57.00 SICKLE CELL CRISIS (H): ICD-10-CM

## 2023-01-19 PROCEDURE — 96376 TX/PRO/DX INJ SAME DRUG ADON: CPT

## 2023-01-19 PROCEDURE — 36430 TRANSFUSION BLD/BLD COMPNT: CPT

## 2023-01-19 PROCEDURE — P9016 RBC LEUKOCYTES REDUCED: HCPCS | Performed by: PEDIATRICS

## 2023-01-19 PROCEDURE — 250N000013 HC RX MED GY IP 250 OP 250 PS 637: Performed by: REGISTERED NURSE

## 2023-01-19 PROCEDURE — 96374 THER/PROPH/DIAG INJ IV PUSH: CPT

## 2023-01-19 PROCEDURE — 250N000011 HC RX IP 250 OP 636: Performed by: PEDIATRICS

## 2023-01-19 RX ORDER — HEPARIN SODIUM (PORCINE) LOCK FLUSH IV SOLN 100 UNIT/ML 100 UNIT/ML
5 SOLUTION INTRAVENOUS
Status: CANCELLED | OUTPATIENT
Start: 2023-04-01

## 2023-01-19 RX ORDER — OXYCODONE HYDROCHLORIDE 20 MG/1
20 TABLET ORAL EVERY 4 HOURS PRN
Qty: 40 TABLET | Refills: 0 | Status: SHIPPED | OUTPATIENT
Start: 2023-01-23 | End: 2023-01-30 | Stop reason: DRUGHIGH

## 2023-01-19 RX ORDER — DIPHENHYDRAMINE HCL 25 MG
25 CAPSULE ORAL
Status: CANCELLED
Start: 2023-04-01

## 2023-01-19 RX ORDER — ONDANSETRON 8 MG/1
8 TABLET, FILM COATED ORAL
Status: CANCELLED
Start: 2023-04-01

## 2023-01-19 RX ORDER — HEPARIN SODIUM,PORCINE 10 UNIT/ML
5 VIAL (ML) INTRAVENOUS
Status: CANCELLED | OUTPATIENT
Start: 2023-04-01

## 2023-01-19 RX ORDER — IBUPROFEN 200 MG
600 TABLET ORAL ONCE
Status: COMPLETED | OUTPATIENT
Start: 2023-01-19 | End: 2023-01-19

## 2023-01-19 RX ADMIN — HYDROMORPHONE HYDROCHLORIDE 1 MG: 1 INJECTION, SOLUTION INTRAMUSCULAR; INTRAVENOUS; SUBCUTANEOUS at 11:22

## 2023-01-19 RX ADMIN — HYDROMORPHONE HYDROCHLORIDE 1 MG: 1 INJECTION, SOLUTION INTRAMUSCULAR; INTRAVENOUS; SUBCUTANEOUS at 12:22

## 2023-01-19 RX ADMIN — IBUPROFEN 600 MG: 200 TABLET, FILM COATED ORAL at 11:39

## 2023-01-19 RX ADMIN — HYDROMORPHONE HYDROCHLORIDE 1 MG: 1 INJECTION, SOLUTION INTRAMUSCULAR; INTRAVENOUS; SUBCUTANEOUS at 10:23

## 2023-01-19 ASSESSMENT — PAIN SCALES - GENERAL: PAINLEVEL: EXTREME PAIN (8)

## 2023-01-19 NOTE — LETTER
1/19/2023         RE: Jennifer Cervantes  [address]  [place] [address]        Dear Colleague,    Thank you for referring your patient, Jennifer Cervantes, to the [facility] [place]. Please see a copy of my visit note below.    Infusion Nursing Note:  Jennifer Cervantes presents today for 1u PRBCs and IV pain meds.    Patient seen by provider today: No   present during visit today: Not Applicable.    Note: Jennifer presents today feeling generally well following her hospital discharge. She notes some ongoing dyspnea, not worsening. Hoping transfusion will help this. Pain 8/10 today, mostly in right rib cage. No other symptoms of concern. New blood consent signed. She received 1u PRBCs per 1/16 labs (1u per Dr. Duncan), as well as total 3mg IV dilaudid. No IVF per Dr. Duncan. She also received 600mg PO ibuprofen per Patricia Mantilla.    Intravenous Access:  Implanted Port.    Treatment Conditions:  Not Applicable.    Post Infusion Assessment:  Patient tolerated infusion without incident.  Blood return noted pre and post infusion.  Site patent and intact, free from redness, edema or discomfort.  No evidence of extravasations.   Port remains accessed for home infusion desferal.    Discharge Plan:   Patient discharged in stable condition accompanied by: self.  Departure Mode: Ambulatory.      Allison Bowman RN                          Again, thank you for allowing me to participate in the care of your patient.        Sincerely,        BMT Infusion Nurse

## 2023-01-19 NOTE — PROGRESS NOTES
Infusion Nursing Note:  Jennifer Cervantes presents today for 1u PRBCs and IV pain meds.    Patient seen by provider today: No   present during visit today: Not Applicable.    Note: Jennifer presents today feeling generally well following her hospital discharge. She notes some ongoing dyspnea, not worsening. Hoping transfusion will help this. Pain 8/10 today, mostly in right rib cage. No other symptoms of concern. New blood consent signed. She received 1u PRBCs per 1/16 labs (1u per Dr. Duncan), as well as total 3mg IV dilaudid. No IVF per Dr. Duncan. She also received 600mg PO ibuprofen per Patricia Mantilla.    Intravenous Access:  Implanted Port.    Treatment Conditions:  Not Applicable.    Post Infusion Assessment:  Patient tolerated infusion without incident.  Blood return noted pre and post infusion.  Site patent and intact, free from redness, edema or discomfort.  No evidence of extravasations.   Port remains accessed for home infusion desferal.    Discharge Plan:   Patient discharged in stable condition accompanied by: self.  Departure Mode: Ambulatory.      Allison Bowman RN

## 2023-01-19 NOTE — LETTER
Date:January 19, 2023      Provider requested that no letter be sent. Do not send.       [facility]

## 2023-01-19 NOTE — TELEPHONE ENCOUNTER
Refill Request    Date of most recent appointment:  1/2/23  Next upcoming appointment:   1/19/23    Medication requested:  oxyCODONE IR (ROXICODONE) 20 MG TABS immediate release tablet  Quantity:  Unsure (pt on way to clinic  Last fill date:  1/16  Person requesting refill:  Pt  Notes:  Using q 4 h, is adequately managing pain. Rx states supply lasts until 1/23    Prescribing provider(s):  Dr Madison  Refill approved/denied:  Approved    Disposition of prescription:  [facility]

## 2023-01-20 ENCOUNTER — NURSE TRIAGE (OUTPATIENT)
Dept: ONCOLOGY | Facility: CLINIC | Age: 23
End: 2023-01-20
Payer: COMMERCIAL

## 2023-01-20 NOTE — TELEPHONE ENCOUNTER
Oncology Nurse Triage - Sickle Cell Pain Crisis:    Situation: Jennifer  calling about Sickle Cell Pain Crisis    Background:   Patient's last infusion was 1/19/23  Last clinic visit date: 1/2/23  Next follow-up appt on 1/30/23  Does patient have active treatment plan? yes    Assessment of Symptoms:  Onset/Duration of symptoms: 12 hrs  Is it typical sickle cell pain? yes  Location: Right side   Character: sharp           Intensity: constant    Any radiation of pain, numbness, tingling, weakness, warmth, swelling, discoloration of extremities? no    Fever? no  (if yes max temperature recorded in last 24 hours):      Chest Pain Present: no    Shortness of breath: no    Other home therapies tried: heat, warm baths    Last home medication taken and when: Oxycodone at 0600 today    Any Refills Needed?: no    Does patient have transportation & length of time to get to clinic: will need transportation    Grades for reference:       Grade 1 -   o Patient has active treatment plan.   o Patients last scheduled clinic appointment was attended  o Patient has not been seen in infusion or ED in the last 3 days (clarify exclusions in therapy plans) - therapy plan states pt can come in for 3 executive days  o Afebrile   o Typically sickle cell pain   o Home medications have been tried   o No other symptoms     Recommendations:   If you do not hear from the infusion center by 2pm then you will not be able to get in for an infusion today. If symptoms worsen while waiting for call back, then seek emergency evaluation in Emergency Department.     Pt added to infusion wait list.

## 2023-01-23 ENCOUNTER — PATIENT OUTREACH (OUTPATIENT)
Dept: CARE COORDINATION | Facility: CLINIC | Age: 23
End: 2023-01-23
Payer: COMMERCIAL

## 2023-01-23 ENCOUNTER — INFUSION THERAPY VISIT (OUTPATIENT)
Dept: ONCOLOGY | Facility: CLINIC | Age: 23
End: 2023-01-23
Payer: COMMERCIAL

## 2023-01-23 VITALS
HEART RATE: 100 BPM | DIASTOLIC BLOOD PRESSURE: 76 MMHG | RESPIRATION RATE: 16 BRPM | OXYGEN SATURATION: 95 % | TEMPERATURE: 97.7 F | SYSTOLIC BLOOD PRESSURE: 111 MMHG

## 2023-01-23 DIAGNOSIS — D57.00 SICKLE CELL PAIN CRISIS (H): ICD-10-CM

## 2023-01-23 DIAGNOSIS — E83.111 IRON OVERLOAD DUE TO REPEATED RED BLOOD CELL TRANSFUSIONS: ICD-10-CM

## 2023-01-23 DIAGNOSIS — G81.10 SPASTIC HEMIPLEGIA, UNSPECIFIED ETIOLOGY, UNSPECIFIED LATERALITY (H): Primary | ICD-10-CM

## 2023-01-23 PROCEDURE — 258N000003 HC RX IP 258 OP 636: Performed by: PEDIATRICS

## 2023-01-23 PROCEDURE — 96376 TX/PRO/DX INJ SAME DRUG ADON: CPT

## 2023-01-23 PROCEDURE — 96361 HYDRATE IV INFUSION ADD-ON: CPT

## 2023-01-23 PROCEDURE — 250N000011 HC RX IP 250 OP 636: Performed by: PEDIATRICS

## 2023-01-23 PROCEDURE — 96374 THER/PROPH/DIAG INJ IV PUSH: CPT

## 2023-01-23 RX ORDER — HEPARIN SODIUM,PORCINE 10 UNIT/ML
5 VIAL (ML) INTRAVENOUS
Status: CANCELLED | OUTPATIENT
Start: 2023-04-01

## 2023-01-23 RX ORDER — ONDANSETRON 8 MG/1
8 TABLET, FILM COATED ORAL
Status: CANCELLED
Start: 2023-04-01

## 2023-01-23 RX ORDER — HEPARIN SODIUM (PORCINE) LOCK FLUSH IV SOLN 100 UNIT/ML 100 UNIT/ML
5 SOLUTION INTRAVENOUS
Status: CANCELLED | OUTPATIENT
Start: 2023-04-01

## 2023-01-23 RX ORDER — HEPARIN SODIUM (PORCINE) LOCK FLUSH IV SOLN 100 UNIT/ML 100 UNIT/ML
5 SOLUTION INTRAVENOUS
Status: DISCONTINUED | OUTPATIENT
Start: 2023-01-23 | End: 2023-01-23 | Stop reason: HOSPADM

## 2023-01-23 RX ORDER — DIPHENHYDRAMINE HCL 25 MG
25 CAPSULE ORAL
Status: CANCELLED
Start: 2023-04-01

## 2023-01-23 RX ORDER — OXYCODONE HYDROCHLORIDE 15 MG/1
15 TABLET ORAL EVERY 4 HOURS PRN
Qty: 40 TABLET | Refills: 0 | Status: SHIPPED | OUTPATIENT
Start: 2023-01-23 | End: 2023-01-30

## 2023-01-23 RX ORDER — DEFERASIROX 360 MG/1
1440 TABLET, FILM COATED ORAL EVERY EVENING
Qty: 120 TABLET | Refills: 4 | Status: SHIPPED | OUTPATIENT
Start: 2023-01-23 | End: 2023-03-10

## 2023-01-23 RX ADMIN — HYDROMORPHONE HYDROCHLORIDE 1 MG: 1 INJECTION, SOLUTION INTRAMUSCULAR; INTRAVENOUS; SUBCUTANEOUS at 09:34

## 2023-01-23 RX ADMIN — HYDROMORPHONE HYDROCHLORIDE 1 MG: 1 INJECTION, SOLUTION INTRAMUSCULAR; INTRAVENOUS; SUBCUTANEOUS at 10:35

## 2023-01-23 RX ADMIN — HYDROMORPHONE HYDROCHLORIDE 1 MG: 1 INJECTION, SOLUTION INTRAMUSCULAR; INTRAVENOUS; SUBCUTANEOUS at 11:36

## 2023-01-23 RX ADMIN — Medication 5 ML: at 11:56

## 2023-01-23 RX ADMIN — SODIUM CHLORIDE, POTASSIUM CHLORIDE, SODIUM LACTATE AND CALCIUM CHLORIDE 1000 ML: 600; 310; 30; 20 INJECTION, SOLUTION INTRAVENOUS at 09:32

## 2023-01-23 NOTE — TELEPHONE ENCOUNTER
Oncology Nurse Triage - Sickle Cell Pain Crisis:    Situation: Jennifer  calling about Sickle Cell Pain Crisis    Background:     Patient's last infusion was 1/19/23   Last clinic visit date:1/2/2023  Next follow-up appt on 1/30/23   Does patient have active treatment plan?  Yes    Assessment of Symptoms:  Onset/Duration of symptoms: 1 day    Is it typical sickle cell pain? Yes   Location: right side  Character: Sharp           Intensity: 810    Any radiation of pain, numbness, tingling, weakness, warmth, swelling, discoloration of extremities?No     Fever?No:      Chest Pain Present: No     Shortness of breath: No     Other home therapies tried: HEAT/HEATING PAD     Last home medication taken and when: Last Oxycodone 20mg taken around 6am today    Any Refills Needed?: Yes , refills on Hydroxyurea 1000mg, Baby Aspirin (refills on file), JADENU needs new refill.   Oxycodone 15mg, 1 tablet every 4hours.    checked      Does patient have transportation & length of time to get to clinic: No       Grades for reference:       Grade 1 -   o Patient has active treatment plan.   o Patients last scheduled clinic appointment was attended  o Patient has not been seen in infusion or ED in the last 3 days (clarify exclusions in therapy plans)   o Afebrile   o Typical sickle cell pain   o Home medications have been tried   o No other symptoms     Recommendations:  Meets protocol.   0740 appt available in [facility] infusion clinic for 9:30am. Pt in agreement with appt time.     0743 Paged Abdullahi FRYE to assist with transportation    0744 Scheduling request sent.     Please note, if you are late for your appt, you risk losing your infusion appt as it may delay another patient's infusion who arrived on time.

## 2023-01-23 NOTE — PATIENT INSTRUCTIONS
Contact Numbers  [facility]: [phone] (for symptom and scheduling needs)    Please call the [facility] Triage line if you experience a temperature greater than or equal to 100.4, shaking chills, have uncontrolled nausea, vomiting and/or diarrhea, dizziness, shortness of breath, chest pain, bleeding, unexplained bruising, or if you have any other new/concerning symptoms, questions or concerns.     If you are having any concerning symptoms or wish to speak to a provider before your next infusion visit, please call your care coordinator or triage to notify them so we can adequately serve you.     If you need a refill on a narcotic prescription or other medication, please call triage before your infusion appointment.

## 2023-01-23 NOTE — PROGRESS NOTES
Social Work Note: Transportation  Oncology Clinic     Data/Intervention:  Patient Name:  Jennifer Cervantes DOB/Age: 2000     Call From: [facility]        Reason for Call:  Transportation     Assessment:   called [employer] to arrange ride. [employer] arranged  for patient from home with a will call return ride.  Patient will need to call when ready for return ride home ([phone]).      Plan:  Patient is aware of the transportation plan.  available to assist with any other needs.      CARLOS Chavez,[facility]  Hematology/Oncology Social Worker  Phone:[phone] Pager: [phone]

## 2023-01-23 NOTE — PROGRESS NOTES
Infusion Nursing Note:  Jennifer Cervantes presents today for IV Fluids and Pain Medication.    Patient seen by provider today: No   present during visit today: Not Applicable.    Note: Patient presents to infusion today reporting 8/10 sickle cell pain in her right side. Denies any fevers, chills, SOB, chest pains or cough. Offers no new symptoms or concerns today. Heating pad applied to right side with some relief.     1L IV fluids and 3 doses of IV dilaudid given. Patient reported pain 3/10 at time of discharge. Stated she felt comfortable discharging home at this time.    Intravenous Access:  Implanted Port.    Treatment Conditions:  Not Applicable.    Post Infusion Assessment:  Patient tolerated infusion without incident.  Blood return noted pre and post infusion.  Site patent and intact, free from redness, edema or discomfort.  No evidence of extravasations.  Access discontinued per protocol.     Discharge Plan:   Prescription refills given for Jadenu and Oxycodone.  Discharge instructions reviewed with: Patient.  Patient and/or family verbalized understanding of discharge instructions and all questions answered.  Copy of AVS reviewed with patient and/or family.  Patient will return 1/30 for next appointment with Patricia Mantilla NP.  Patient discharged in stable condition accompanied by: self.  Departure Mode: Ambulatory.      Maritza Bautista RN

## 2023-01-25 ENCOUNTER — TELEPHONE (OUTPATIENT)
Dept: ONCOLOGY | Facility: CLINIC | Age: 23
End: 2023-01-25
Payer: COMMERCIAL

## 2023-01-25 NOTE — PROGRESS NOTES
PM&R Note    Chief Complaint: Spastic Hemiparesis    /80   Pulse 86   Temp 98.2  F (36.8  C) (Oral)   Resp 16   Wt 68.9 kg (152 lb)   SpO2 98%   BMI 26.08 kg/m         Current Outpatient Medications:      acetaminophen (TYLENOL) 325 MG tablet, Take 2 tablets (650 mg) by mouth every 6 hours as needed for mild pain, Disp: 120 tablet, Rfl: 3     albuterol (PROVENTIL) (2.5 MG/3ML) 0.083% neb solution, Take 2 vials (5 mg) by nebulization every 6 hours as needed for shortness of breath / dyspnea or wheezing, Disp: 90 mL, Rfl: 3     aspirin (ASA) 81 MG chewable tablet, Take 1 tablet (81 mg) by mouth 2 times daily, Disp: 60 tablet, Rfl: 11     budesonide-formoterol (SYMBICORT) 160-4.5 MCG/ACT Inhaler, Inhale 2 puffs into the lungs 2 times daily, Disp: 10.2 g, Rfl: 3     deferasirox (JADENU) 360 MG tablet, Take 4 tablets (1,440 mg) by mouth every evening, Disp: 120 tablet, Rfl: 4     diclofenac (VOLTAREN) 1 % topical gel, Apply 4 g topically 4 times daily, Disp: 350 g, Rfl: 0     EPINEPHrine (ANY BX GENERIC EQUIV) 0.3 MG/0.3ML injection 2-pack, Inject 0.3 mLs (0.3 mg) into the muscle as needed for anaphylaxis (Patient not taking: Reported on 1/19/2023), Disp: 1 each, Rfl: 1     FLUoxetine (PROZAC) 10 MG capsule, Take 1 capsule (10 mg) by mouth daily For two weeks, then increase to 20 mg if 10 mg not effective, Disp: 40 capsule, Rfl: 1     folic acid (FOLVITE) 1 MG tablet, Take 1 tablet (1 mg) by mouth daily, Disp: 30 tablet, Rfl: 0     Hydroxyurea 1000 MG TABS, Take 3,000 mg by mouth daily, Disp: 90 tablet, Rfl: 3     ondansetron (ZOFRAN) 8 MG tablet, Take 1 tablet (8 mg) by mouth every 8 hours as needed, Disp: 30 tablet, Rfl: 1     oxyCODONE HCl (ROXICODONE) 20 MG TABS immediate release tablet, Take 20 mg by mouth every 4 hours as needed (moderate to severe pain (pain level 5-10)), Disp: 40 tablet, Rfl: 0     oxyCODONE IR (ROXICODONE) 15 MG tablet, Take 1 tablet (15 mg) by mouth every 4 hours as needed for  severe pain (7-10) Goal 4 per day. Max 6 per day., Disp: 40 tablet, Rfl: 0     traZODone (DESYREL) 50 MG tablet, Take 1 tablet (50 mg) by mouth At Bedtime, Disp: 30 tablet, Rfl: 1    Current Facility-Administered Medications:      botulinum toxin type A (BOTOX) 100 units injection 400 Units, 400 Units, Intramuscular, Q90 Days, Eric Duncan MD     medroxyPROGESTERone (DEPO-PROVERA) injection 150 mg, 150 mg, Intramuscular, Q90 Days, Suraj Case MD, 150 mg at 12/30/22 1348        Allergies   Allergen Reactions     Contrast Dye      Hives and breathing issues     Fish-Derived Products Hives     Seafood Hives     Diagnostic X-Ray Materials      Gadolinium         PHYSICAL EXAM  Motor: 4+/5 with right shoulder abduction, 4/5 with right elbow flexion, unable to assess wrist flexion due to contracture. 4/5 with  strength on right. 5/5 left shoulder abduction, elbow extension, wrist extension and  strength/finger intrinsics. 4/5 with right hip flexion, 4/5 with right knee extension, 3/5 with right ankle dorsiflexion, 4+/5 with right EHL, plantar flexion. 5/5 with all muscle groups in left lower extremity.  ROM is 120 degrees with right shoulder abduction, about 130 degrees with right elbow extension.   Tone with MAS- 2/4 with right shoulder abduction, 3/4 with right finger flexors/intrinsics, 2/4 with right knee flexion. Significant right wrist contracture with minimal extension.  Sensory: intact to light touch throughout all dermatomes in bilateral lower extremities.      HPI  Last set of injections were on 10/12/22.  Patient has been ok since her last visit. Continues with range of motion exercises at home.   RESPONSE TO PREVIOUS TREATMENT:  Patient reports significant noticeable improvement in right upper and lower extremity range of motion since her last visit. About 60%. She states that she has not noticed increased wearing off of the effects of the medication. She feels more of a stretch  when she gets the injections for several weeks. She is seeing Dr. Franco on 2/2/23 to explore any surgical options that might be available for her right arm.      INDICATIONS FOR PROCEDURES:  Jennifer Cervantes is a 21 year old patient with spasticity affecting the right upper extremity and right lower extremity secondary to a diagnosis of sickle cell disease and previous CVA with resulting spastic hemiparesis with associated pain, loss of joint motion, loss of volitional motor control, hygiene difficulty, difficulty with activities of daily living and difficulty with ambulation and transfers.    Her baseline symptoms have been recalcitrant to oral medications and conservative therapy.  She is here today for reinjection with Botox.    Obtaining Botox from the pharmacy was very much delayed at today's visit.  Took almost an hour to receive the medication today.  Due to this, patient waited for some time but could not wait any longer as she was in pain and had to go for her scheduled infusion.  So she decided not to get the injections today, and we will have to reschedule her.    Jennifer Cervantes will follow up by phone with Dr. Pierce for any questions or concerns that may arise.  We will try to schedule her at the earliest available for the injections which she was supposed to receive today.    Katherine Pierce MD  Physical Medicine & Rehabilitation      30 minutes spent on the date of the encounter doing chart review, history and exam, documentation and further activities as noted above.

## 2023-01-26 ENCOUNTER — TELEPHONE (OUTPATIENT)
Dept: INTERNAL MEDICINE | Facility: CLINIC | Age: 23
End: 2023-01-26

## 2023-01-26 ENCOUNTER — OFFICE VISIT (OUTPATIENT)
Dept: ONCOLOGY | Facility: CLINIC | Age: 23
End: 2023-01-26
Attending: STUDENT IN AN ORGANIZED HEALTH CARE EDUCATION/TRAINING PROGRAM
Payer: COMMERCIAL

## 2023-01-26 ENCOUNTER — TELEPHONE (OUTPATIENT)
Dept: ONCOLOGY | Facility: CLINIC | Age: 23
End: 2023-01-26
Payer: COMMERCIAL

## 2023-01-26 VITALS
OXYGEN SATURATION: 97 % | HEART RATE: 83 BPM | RESPIRATION RATE: 16 BRPM | DIASTOLIC BLOOD PRESSURE: 76 MMHG | SYSTOLIC BLOOD PRESSURE: 111 MMHG

## 2023-01-26 VITALS
DIASTOLIC BLOOD PRESSURE: 80 MMHG | SYSTOLIC BLOOD PRESSURE: 116 MMHG | HEART RATE: 86 BPM | TEMPERATURE: 98.2 F | RESPIRATION RATE: 16 BRPM | BODY MASS INDEX: 26.08 KG/M2 | OXYGEN SATURATION: 98 % | WEIGHT: 152 LBS

## 2023-01-26 DIAGNOSIS — D57.00 SICKLE CELL PAIN CRISIS (H): ICD-10-CM

## 2023-01-26 DIAGNOSIS — G81.10 SPASTIC HEMIPARESIS (H): ICD-10-CM

## 2023-01-26 DIAGNOSIS — G81.10 SPASTIC HEMIPLEGIA, UNSPECIFIED ETIOLOGY, UNSPECIFIED LATERALITY (H): ICD-10-CM

## 2023-01-26 DIAGNOSIS — I69.351 SPASTIC HEMIPLEGIA OF RIGHT DOMINANT SIDE AS LATE EFFECT OF CEREBRAL INFARCTION (H): Primary | ICD-10-CM

## 2023-01-26 DIAGNOSIS — G81.10 SPASTIC HEMIPLEGIA, UNSPECIFIED ETIOLOGY, UNSPECIFIED LATERALITY (H): Primary | ICD-10-CM

## 2023-01-26 DIAGNOSIS — D57.1 SICKLE CELL DISEASE WITHOUT CRISIS (H): ICD-10-CM

## 2023-01-26 DIAGNOSIS — I69.351 HEMIPLEGIA AND HEMIPARESIS FOLLOWING CEREBRAL INFARCTION AFFECTING RIGHT DOMINANT SIDE (H): ICD-10-CM

## 2023-01-26 PROCEDURE — 258N000003 HC RX IP 258 OP 636: Performed by: PEDIATRICS

## 2023-01-26 PROCEDURE — G0463 HOSPITAL OUTPT CLINIC VISIT: HCPCS | Mod: 25 | Performed by: STUDENT IN AN ORGANIZED HEALTH CARE EDUCATION/TRAINING PROGRAM

## 2023-01-26 PROCEDURE — 96361 HYDRATE IV INFUSION ADD-ON: CPT

## 2023-01-26 PROCEDURE — G0463 HOSPITAL OUTPT CLINIC VISIT: HCPCS

## 2023-01-26 PROCEDURE — G0463 HOSPITAL OUTPT CLINIC VISIT: HCPCS | Performed by: STUDENT IN AN ORGANIZED HEALTH CARE EDUCATION/TRAINING PROGRAM

## 2023-01-26 PROCEDURE — 99214 OFFICE O/P EST MOD 30 MIN: CPT | Performed by: STUDENT IN AN ORGANIZED HEALTH CARE EDUCATION/TRAINING PROGRAM

## 2023-01-26 PROCEDURE — 96376 TX/PRO/DX INJ SAME DRUG ADON: CPT

## 2023-01-26 PROCEDURE — 250N000011 HC RX IP 250 OP 636: Performed by: PEDIATRICS

## 2023-01-26 PROCEDURE — 96374 THER/PROPH/DIAG INJ IV PUSH: CPT

## 2023-01-26 RX ORDER — HEPARIN SODIUM,PORCINE 10 UNIT/ML
5 VIAL (ML) INTRAVENOUS
Status: CANCELLED | OUTPATIENT
Start: 2023-04-01

## 2023-01-26 RX ORDER — DIPHENHYDRAMINE HCL 25 MG
25 CAPSULE ORAL
Status: COMPLETED | OUTPATIENT
Start: 2023-01-26 | End: 2023-01-26

## 2023-01-26 RX ORDER — DIPHENHYDRAMINE HCL 25 MG
25 CAPSULE ORAL
Status: CANCELLED
Start: 2023-04-01

## 2023-01-26 RX ORDER — ONDANSETRON 8 MG/1
8 TABLET, FILM COATED ORAL
Status: CANCELLED
Start: 2023-04-01

## 2023-01-26 RX ORDER — HEPARIN SODIUM (PORCINE) LOCK FLUSH IV SOLN 100 UNIT/ML 100 UNIT/ML
5 SOLUTION INTRAVENOUS
Status: CANCELLED | OUTPATIENT
Start: 2023-04-01

## 2023-01-26 RX ORDER — HEPARIN SODIUM (PORCINE) LOCK FLUSH IV SOLN 100 UNIT/ML 100 UNIT/ML
5 SOLUTION INTRAVENOUS
Status: DISCONTINUED | OUTPATIENT
Start: 2023-01-26 | End: 2023-01-26 | Stop reason: HOSPADM

## 2023-01-26 RX ORDER — ONDANSETRON 8 MG/1
8 TABLET, ORALLY DISINTEGRATING ORAL
Status: COMPLETED | OUTPATIENT
Start: 2023-01-26 | End: 2023-01-26

## 2023-01-26 RX ADMIN — HYDROMORPHONE HYDROCHLORIDE 1 MG: 1 INJECTION, SOLUTION INTRAMUSCULAR; INTRAVENOUS; SUBCUTANEOUS at 12:02

## 2023-01-26 RX ADMIN — HYDROMORPHONE HYDROCHLORIDE 1 MG: 1 INJECTION, SOLUTION INTRAMUSCULAR; INTRAVENOUS; SUBCUTANEOUS at 11:09

## 2023-01-26 RX ADMIN — HYDROMORPHONE HYDROCHLORIDE 1 MG: 1 INJECTION, SOLUTION INTRAMUSCULAR; INTRAVENOUS; SUBCUTANEOUS at 13:03

## 2023-01-26 RX ADMIN — Medication 5 ML: at 13:34

## 2023-01-26 RX ADMIN — SODIUM CHLORIDE, POTASSIUM CHLORIDE, SODIUM LACTATE AND CALCIUM CHLORIDE 1000 ML: 600; 310; 30; 20 INJECTION, SOLUTION INTRAVENOUS at 11:09

## 2023-01-26 ASSESSMENT — PAIN SCALES - GENERAL
PAINLEVEL: EXTREME PAIN (9)
PAINLEVEL: EXTREME PAIN (8)

## 2023-01-26 NOTE — PATIENT INSTRUCTIONS
Jennifer,     We are so sorry for the weight that you had today to get the medication from pharmacy.  We will try to work with you to reschedule with these injections at the earliest available.  Mandie will be in touch with you as needed.    Katherine Pierce MD  Physical Medicine & Rehabilitation

## 2023-01-26 NOTE — TELEPHONE ENCOUNTER
Per RNCC: pt is scheduled for botox injection today at 0900. She will be done around . May call for IVF/pain meds.    Pt called in to triage requesting IVF pain meds for R side pain rated 9/10 x 1 days. Stated last took prn oxycodone at 0600 this morning without relief. Denied any fevers, chills, cough, sob, chest pain, numbness or tingling or other symptoms not typical of sickle cell pain.   Pt's last infusion was 1/23/23, last clinic visit 1/2/23 with follow-up on 1/30/23 with Patricia Mantilla CNP.   Patient states has a ride to clinic  Pt denied being out of home medications and needing any refills today.   Pt qualifies for sickle cell standing order protocol.  If you do not hear from the infusion center by 2pm then you will not be able to get in for an infusion today.     Added to infusion wait list. Requested apt at  if possible.     [facility] can offer apt at 1030  Pt confirmed she can come to apt.  Pt confirmed she has her own transportation home.  Updated [facility] Charge Nurse.  Message sent to CCOD to schedule.    Message routed to Dr. Duncan and Arely Krueger, RNCC

## 2023-01-26 NOTE — PROGRESS NOTES
Infusion Nursing Note:  Jennifer Cervantes presents today for IV hydration and pain medication.    Patient spoke with provider today: No    Treatment Conditions:  Not Applicable.    Note: Patient presents to infusion today reporting 9/10 sickle cell pain in her right side.  Pt stated she took Oxycodone around 0600 this morning.  Denies any fevers, chills, SOB, chest pains or cough.   Offers no new symptoms or concerns today. Declined need for zofran or benadryl today.  Heating pad applied to right side with some relief.      1L IV fluids and 3 doses of IV dilaudid given.   Patient reported pain level 3 /10 at time of discharge.   Stated she felt comfortable discharging home at this time.    Intravenous Access:  Implanted Port.    Post Infusion Assessment:  Patient tolerated infusion without incident.  Blood return noted pre and post infusion.  Access discontinued per protocol.    Discharge Plan:   Patient declined prescription refills.  Discharge instructions reviewed with: Patient.  Patient and/or family verbalized understanding of discharge instructions and all questions answered.  Copy of AVS reviewed with patient and/or family.  Patient will return 1/30/23 for next appointment.  AVS to patient via [employer].  Patient will return 1/30/23 for next appointment.   Patient discharged in stable condition accompanied by: self/taxi.  Departure Mode: Ambulatory.    Lina Houston RN

## 2023-01-26 NOTE — TELEPHONE ENCOUNTER
Please call patient soon, and:  -- verify timing and schedule of her depo shots (I'm not sure what timing is, but she is approved through 2023-Sept).  -- try to get her scheduled for a preventive visit with me, when she is able. This should probably be a 60 min in-person visit.

## 2023-01-26 NOTE — PATIENT INSTRUCTIONS
[facility] Triage and after hours / weekends / holidays:  [phone]    Please call the triage or after hours line if you experience a temperature greater than or equal to 100.4, shaking chills, have uncontrolled nausea, vomiting and/or diarrhea, dizziness, shortness of breath, chest pain, bleeding, unexplained bruising, or if you have any other new/concerning symptoms, questions or concerns.      If you are having any concerning symptoms or wish to speak to a provider before your next infusion visit, please call your care coordinator or triage to notify them so we can adequately serve you.     If you need a refill on a narcotic prescription or other medication, please call before your infusion appointment.                January 2023 Sunday Monday Tuesday Wednesday Thursday Friday Saturday   1     2    LAB CENTRAL   8:30 AM   (15 min.)    Open CS LAB DRAW   [facility]    ONC INFUSION 2 HR (120 MIN)   9:00 AM   (120 min.)    ONC INFUSION NURSE   [facility]    RETURN  11:15 AM   (30 min.)   Eric Duncan MD   [facility] 3     4     5     6     7       8    Admission   8:28 AM   Jamaal Garcia MD   [facility] [address] [place]   (Discharge: 1/16/2023)    XR CHEST 2 VIEWS   8:45 AM   (20 min.)   [id number]   [facility]    NM LUNG SCAN VENT/PERF   2:45 PM   (60 min.)   UUNM2   [facility] 9     10     11    XR CHEST PORT 1 VIEW   4:10 AM   (20 min.)   [id number]   [facility] 12    XR CHEST PORT 1 VIEW   2:35 PM   (20 min.)   [id number]   [facility]    NM LUNG SCAN VENT/PERF   3:45 PM   (60 min.)   UUNM2   [facility] 13     14       15     16     17     18    LAB CENTRAL   4:15 PM   (15 min.)    [facility]   [facility] 19    BMT INFUSION 3 HR (180 MIN)  10:30 AM   (180  min.)    BMT INFUSION NURSE   [facility] [place] 20     21       22     23    ONC INFUSION 2 HR (120 MIN)   9:30 AM   (120 min.)    ONC INFUSION NURSE   [facility] 24     25     26    NEUROTOXIN   8:45 AM   (60 min.)   Katherine Pierce MD   [facility]    ONC INFUSION 2 HR (120 MIN)  10:30 AM   (120 min.)    ONC INFUSION NURSE   [facility] 27     28       29     30    RETURN CCSL  11:30 AM   (45 min.)   Patricia Mantilla CNP   [facility] 31 February 2023 Sunday Monday Tuesday Wednesday Thursday Friday Saturday                  1     2    NEW HAND/WRIST   7:55 AM   (20 min.)   Anai Franco MD   [facility] [place] 3     4       5     6     7     8     9     10     11       12     13     14     15     16     17     18       19     20     21     22     23     24     25       26     27     28                                          No results found for this or any previous visit (from the past 24 hour(s)).

## 2023-01-26 NOTE — NURSING NOTE
"Oncology Rooming Note    January 26, 2023 8:47 AM   Jennifer Cervantes is a 22 year old female who presents for:    Chief Complaint   Patient presents with     Oncology Clinic Visit     RTN for Sickle Cell -Neurotoxin     Initial Vitals: Blood Pressure 116/80   Pulse 86   Temperature 98.2  F (36.8  C) (Oral)   Respiration 16   Weight 68.9 kg (152 lb)   Oxygen Saturation 98%   Body Mass Index 26.08 kg/m   Estimated body mass index is 26.08 kg/m  as calculated from the following:    Height as of 1/16/23: 1.626 m (5' 4.02\").    Weight as of this encounter: 68.9 kg (152 lb). Body surface area is 1.76 meters squared.  Extreme Pain (8) Comment: Data Unavailable   No LMP recorded. Patient has had an injection.  Allergies reviewed: Yes  Medications reviewed: Yes    Medications: Medication refills not needed today.  Pharmacy name entered into [employer]: [facility] [place], [place] - [address] [place] [address] [phone]    Clinical concerns: none       Lily Steiner MA            "

## 2023-01-26 NOTE — LETTER
1/26/2023         RE: Jennifer Cervantes  [address]  [facility] [address]        Dear Colleague,    Thank you for referring your patient, Jennifer Cervantes, to the [facility]. Please see a copy of my visit note below.    PM&R Note    Chief Complaint: Spastic Hemiparesis    /80   Pulse 86   Temp 98.2  F (36.8  C) (Oral)   Resp 16   Wt 68.9 kg (152 lb)   SpO2 98%   BMI 26.08 kg/m         Current Outpatient Medications:      acetaminophen (TYLENOL) 325 MG tablet, Take 2 tablets (650 mg) by mouth every 6 hours as needed for mild pain, Disp: 120 tablet, Rfl: 3     albuterol (PROVENTIL) (2.5 MG/3ML) 0.083% neb solution, Take 2 vials (5 mg) by nebulization every 6 hours as needed for shortness of breath / dyspnea or wheezing, Disp: 90 mL, Rfl: 3     aspirin (ASA) 81 MG chewable tablet, Take 1 tablet (81 mg) by mouth 2 times daily, Disp: 60 tablet, Rfl: 11     budesonide-formoterol (SYMBICORT) 160-4.5 MCG/ACT Inhaler, Inhale 2 puffs into the lungs 2 times daily, Disp: 10.2 g, Rfl: 3     deferasirox (JADENU) 360 MG tablet, Take 4 tablets (1,440 mg) by mouth every evening, Disp: 120 tablet, Rfl: 4     diclofenac (VOLTAREN) 1 % topical gel, Apply 4 g topically 4 times daily, Disp: 350 g, Rfl: 0     EPINEPHrine (ANY BX GENERIC EQUIV) 0.3 MG/0.3ML injection 2-pack, Inject 0.3 mLs (0.3 mg) into the muscle as needed for anaphylaxis (Patient not taking: Reported on 1/19/2023), Disp: 1 each, Rfl: 1     FLUoxetine (PROZAC) 10 MG capsule, Take 1 capsule (10 mg) by mouth daily For two weeks, then increase to 20 mg if 10 mg not effective, Disp: 40 capsule, Rfl: 1     folic acid (FOLVITE) 1 MG tablet, Take 1 tablet (1 mg) by mouth daily, Disp: 30 tablet, Rfl: 0     Hydroxyurea 1000 MG TABS, Take 3,000 mg by mouth daily, Disp: 90 tablet, Rfl: 3     ondansetron (ZOFRAN) 8 MG tablet, Take 1 tablet (8 mg) by mouth every 8 hours as needed, Disp: 30 tablet, Rfl: 1     oxyCODONE HCl (ROXICODONE) 20 MG  TABS immediate release tablet, Take 20 mg by mouth every 4 hours as needed (moderate to severe pain (pain level 5-10)), Disp: 40 tablet, Rfl: 0     oxyCODONE IR (ROXICODONE) 15 MG tablet, Take 1 tablet (15 mg) by mouth every 4 hours as needed for severe pain (7-10) Goal 4 per day. Max 6 per day., Disp: 40 tablet, Rfl: 0     traZODone (DESYREL) 50 MG tablet, Take 1 tablet (50 mg) by mouth At Bedtime, Disp: 30 tablet, Rfl: 1    Current Facility-Administered Medications:      botulinum toxin type A (BOTOX) 100 units injection 400 Units, 400 Units, Intramuscular, Q90 Days, Eric Duncan MD     medroxyPROGESTERone (DEPO-PROVERA) injection 150 mg, 150 mg, Intramuscular, Q90 Days, Suraj Case MD, 150 mg at 12/30/22 1348        Allergies   Allergen Reactions     Contrast Dye      Hives and breathing issues     Fish-Derived Products Hives     Seafood Hives     Diagnostic X-Ray Materials      Gadolinium         PHYSICAL EXAM  Motor: 4+/5 with right shoulder abduction, 4/5 with right elbow flexion, unable to assess wrist flexion due to contracture. 4/5 with  strength on right. 5/5 left shoulder abduction, elbow extension, wrist extension and  strength/finger intrinsics. 4/5 with right hip flexion, 4/5 with right knee extension, 3/5 with right ankle dorsiflexion, 4+/5 with right EHL, plantar flexion. 5/5 with all muscle groups in left lower extremity.  ROM is 120 degrees with right shoulder abduction, about 130 degrees with right elbow extension.   Tone with MAS- 2/4 with right shoulder abduction, 3/4 with right finger flexors/intrinsics, 2/4 with right knee flexion. Significant right wrist contracture with minimal extension.  Sensory: intact to light touch throughout all dermatomes in bilateral lower extremities.      HPI  Last set of injections were on 10/12/22.  Patient has been ok since her last visit. Continues with range of motion exercises at home.   RESPONSE TO PREVIOUS TREATMENT:  Patient  reports significant noticeable improvement in right upper and lower extremity range of motion since her last visit. About 60%. She states that she has not noticed increased wearing off of the effects of the medication. She feels more of a stretch when she gets the injections for several weeks. She is seeing Dr. Franco on 2/2/23 to explore any surgical options that might be available for her right arm.      INDICATIONS FOR PROCEDURES:  Jennifer Cervantes is a 21 year old patient with spasticity affecting the right upper extremity and right lower extremity secondary to a diagnosis of sickle cell disease and previous CVA with resulting spastic hemiparesis with associated pain, loss of joint motion, loss of volitional motor control, hygiene difficulty, difficulty with activities of daily living and difficulty with ambulation and transfers.    Her baseline symptoms have been recalcitrant to oral medications and conservative therapy.  She is here today for reinjection with Botox.    Obtaining Botox from the pharmacy was very much delayed at today's visit.  Took almost an hour to receive the medication today.  Due to this, patient waited for some time but could not wait any longer as she was in pain and had to go for her scheduled infusion.  So she decided not to get the injections today, and we will have to reschedule her.    Jennifer Cervantes will follow up by phone with Dr. Pierce for any questions or concerns that may arise.  We will try to schedule her at the earliest available for the injections which she was supposed to receive today.    Katherine Pierce MD  Physical Medicine & Rehabilitation      30 minutes spent on the date of the encounter doing chart review, history and exam, documentation and further activities as noted above.

## 2023-01-26 NOTE — TELEPHONE ENCOUNTER
PA Initiation    Medication: Siklos 1000mg PA Pending  Insurance Company: [employer] - Phone [phone] Fax [phone]  Pharmacy Filling the Rx:    Filling Pharmacy Phone:    Filling Pharmacy Fax:    Start Date: 1/26/2023      Lizeth Chavarria CPhT  [facility]  Oncology Pharmacy Liaison II  [email or web address]  [phone] (phone  [phone] (fax

## 2023-01-30 ENCOUNTER — NURSE TRIAGE (OUTPATIENT)
Dept: ONCOLOGY | Facility: CLINIC | Age: 23
End: 2023-01-30
Payer: COMMERCIAL

## 2023-01-30 ENCOUNTER — INFUSION THERAPY VISIT (OUTPATIENT)
Dept: TRANSPLANT | Facility: CLINIC | Age: 23
End: 2023-01-30
Attending: REGISTERED NURSE
Payer: COMMERCIAL

## 2023-01-30 ENCOUNTER — ONCOLOGY VISIT (OUTPATIENT)
Dept: ONCOLOGY | Facility: CLINIC | Age: 23
End: 2023-01-30
Attending: REGISTERED NURSE
Payer: COMMERCIAL

## 2023-01-30 VITALS
TEMPERATURE: 98.1 F | DIASTOLIC BLOOD PRESSURE: 72 MMHG | HEART RATE: 74 BPM | OXYGEN SATURATION: 94 % | SYSTOLIC BLOOD PRESSURE: 114 MMHG | RESPIRATION RATE: 16 BRPM

## 2023-01-30 VITALS
BODY MASS INDEX: 26.08 KG/M2 | DIASTOLIC BLOOD PRESSURE: 76 MMHG | RESPIRATION RATE: 16 BRPM | HEART RATE: 88 BPM | OXYGEN SATURATION: 99 % | TEMPERATURE: 97.6 F | WEIGHT: 152 LBS | SYSTOLIC BLOOD PRESSURE: 126 MMHG

## 2023-01-30 DIAGNOSIS — G81.10 SPASTIC HEMIPLEGIA, UNSPECIFIED ETIOLOGY, UNSPECIFIED LATERALITY (H): Primary | ICD-10-CM

## 2023-01-30 DIAGNOSIS — D57.00 SICKLE CELL PAIN CRISIS (H): ICD-10-CM

## 2023-01-30 DIAGNOSIS — E83.111 IRON OVERLOAD DUE TO REPEATED RED BLOOD CELL TRANSFUSIONS: ICD-10-CM

## 2023-01-30 DIAGNOSIS — D57.00 SICKLE CELL PAIN CRISIS (H): Primary | ICD-10-CM

## 2023-01-30 DIAGNOSIS — J45.909 ASTHMA, UNSPECIFIED ASTHMA SEVERITY, UNSPECIFIED WHETHER COMPLICATED, UNSPECIFIED WHETHER PERSISTENT: ICD-10-CM

## 2023-01-30 DIAGNOSIS — J45.909 ASTHMATIC BRONCHITIS WITHOUT COMPLICATION, UNSPECIFIED ASTHMA SEVERITY, UNSPECIFIED WHETHER PERSISTENT: ICD-10-CM

## 2023-01-30 LAB
ALBUMIN SERPL BCG-MCNC: 4.5 G/DL (ref 3.5–5.2)
ALP SERPL-CCNC: 84 U/L (ref 35–104)
ALT SERPL W P-5'-P-CCNC: 15 U/L (ref 10–35)
ANION GAP SERPL CALCULATED.3IONS-SCNC: 10 MMOL/L (ref 7–15)
AST SERPL W P-5'-P-CCNC: 26 U/L (ref 10–35)
BASOPHILS # BLD AUTO: 0.2 10E3/UL (ref 0–0.2)
BASOPHILS NFR BLD AUTO: 1 %
BILIRUB SERPL-MCNC: 1.8 MG/DL
BUN SERPL-MCNC: 4.4 MG/DL (ref 6–20)
CALCIUM SERPL-MCNC: 9.3 MG/DL (ref 8.6–10)
CHLORIDE SERPL-SCNC: 105 MMOL/L (ref 98–107)
CREAT SERPL-MCNC: 0.52 MG/DL (ref 0.51–0.95)
DEPRECATED HCO3 PLAS-SCNC: 21 MMOL/L (ref 22–29)
EOSINOPHIL # BLD AUTO: 0.3 10E3/UL (ref 0–0.7)
EOSINOPHIL NFR BLD AUTO: 3 %
ERYTHROCYTE [DISTWIDTH] IN BLOOD BY AUTOMATED COUNT: 22.1 % (ref 10–15)
GFR SERPL CREATININE-BSD FRML MDRD: >90 ML/MIN/1.73M2
GLUCOSE SERPL-MCNC: 91 MG/DL (ref 70–99)
HCT VFR BLD AUTO: 23.5 % (ref 35–47)
HGB BLD-MCNC: 7.8 G/DL (ref 11.7–15.7)
IMM GRANULOCYTES # BLD: 0.1 10E3/UL
IMM GRANULOCYTES NFR BLD: 0 %
LYMPHOCYTES # BLD AUTO: 1.5 10E3/UL (ref 0.8–5.3)
LYMPHOCYTES NFR BLD AUTO: 12 %
MCH RBC QN AUTO: 26.3 PG (ref 26.5–33)
MCHC RBC AUTO-ENTMCNC: 33.2 G/DL (ref 31.5–36.5)
MCV RBC AUTO: 79 FL (ref 78–100)
MONOCYTES # BLD AUTO: 0.7 10E3/UL (ref 0–1.3)
MONOCYTES NFR BLD AUTO: 5 %
NEUTROPHILS # BLD AUTO: 10.1 10E3/UL (ref 1.6–8.3)
NEUTROPHILS NFR BLD AUTO: 79 %
NRBC # BLD AUTO: 0 10E3/UL
NRBC BLD AUTO-RTO: 0 /100
PLATELET # BLD AUTO: 682 10E3/UL (ref 150–450)
POTASSIUM SERPL-SCNC: 4 MMOL/L (ref 3.4–5.3)
PROT SERPL-MCNC: 7.9 G/DL (ref 6.4–8.3)
RBC # BLD AUTO: 2.97 10E6/UL (ref 3.8–5.2)
RETICS # AUTO: 0.21 10E6/UL (ref 0.03–0.1)
RETICS/RBC NFR AUTO: 7 % (ref 0.5–2)
SODIUM SERPL-SCNC: 136 MMOL/L (ref 136–145)
WBC # BLD AUTO: 12.8 10E3/UL (ref 4–11)

## 2023-01-30 PROCEDURE — 80053 COMPREHEN METABOLIC PANEL: CPT | Performed by: REGISTERED NURSE

## 2023-01-30 PROCEDURE — 96376 TX/PRO/DX INJ SAME DRUG ADON: CPT

## 2023-01-30 PROCEDURE — 36591 DRAW BLOOD OFF VENOUS DEVICE: CPT | Performed by: REGISTERED NURSE

## 2023-01-30 PROCEDURE — G0463 HOSPITAL OUTPT CLINIC VISIT: HCPCS

## 2023-01-30 PROCEDURE — 99215 OFFICE O/P EST HI 40 MIN: CPT | Performed by: REGISTERED NURSE

## 2023-01-30 PROCEDURE — 250N000011 HC RX IP 250 OP 636: Performed by: REGISTERED NURSE

## 2023-01-30 PROCEDURE — 96374 THER/PROPH/DIAG INJ IV PUSH: CPT

## 2023-01-30 PROCEDURE — 250N000011 HC RX IP 250 OP 636: Performed by: PEDIATRICS

## 2023-01-30 PROCEDURE — 85025 COMPLETE CBC W/AUTO DIFF WBC: CPT | Performed by: REGISTERED NURSE

## 2023-01-30 PROCEDURE — 258N000003 HC RX IP 258 OP 636: Performed by: PEDIATRICS

## 2023-01-30 PROCEDURE — 96361 HYDRATE IV INFUSION ADD-ON: CPT

## 2023-01-30 PROCEDURE — G0463 HOSPITAL OUTPT CLINIC VISIT: HCPCS | Mod: 25

## 2023-01-30 PROCEDURE — G0463 HOSPITAL OUTPT CLINIC VISIT: HCPCS | Mod: 25 | Performed by: REGISTERED NURSE

## 2023-01-30 PROCEDURE — 85045 AUTOMATED RETICULOCYTE COUNT: CPT | Performed by: REGISTERED NURSE

## 2023-01-30 RX ORDER — HEPARIN SODIUM,PORCINE 10 UNIT/ML
5 VIAL (ML) INTRAVENOUS
Status: CANCELLED | OUTPATIENT
Start: 2023-04-01

## 2023-01-30 RX ORDER — DIPHENHYDRAMINE HCL 25 MG
25 CAPSULE ORAL
Status: CANCELLED
Start: 2023-04-01

## 2023-01-30 RX ORDER — HEPARIN SODIUM (PORCINE) LOCK FLUSH IV SOLN 100 UNIT/ML 100 UNIT/ML
5 SOLUTION INTRAVENOUS EVERY 8 HOURS
Status: DISCONTINUED | OUTPATIENT
Start: 2023-01-30 | End: 2023-01-30 | Stop reason: HOSPADM

## 2023-01-30 RX ORDER — HEPARIN SODIUM (PORCINE) LOCK FLUSH IV SOLN 100 UNIT/ML 100 UNIT/ML
5 SOLUTION INTRAVENOUS
Status: CANCELLED | OUTPATIENT
Start: 2023-04-01

## 2023-01-30 RX ORDER — OXYCODONE HYDROCHLORIDE 15 MG/1
15 TABLET ORAL EVERY 4 HOURS PRN
Qty: 35 TABLET | Refills: 0 | Status: SHIPPED | OUTPATIENT
Start: 2023-01-30 | End: 2023-02-06

## 2023-01-30 RX ORDER — ONDANSETRON 8 MG/1
8 TABLET, FILM COATED ORAL
Status: CANCELLED
Start: 2023-04-01

## 2023-01-30 RX ADMIN — HYDROMORPHONE HYDROCHLORIDE 1 MG: 1 INJECTION, SOLUTION INTRAMUSCULAR; INTRAVENOUS; SUBCUTANEOUS at 14:31

## 2023-01-30 RX ADMIN — HYDROMORPHONE HYDROCHLORIDE 1 MG: 1 INJECTION, SOLUTION INTRAMUSCULAR; INTRAVENOUS; SUBCUTANEOUS at 15:34

## 2023-01-30 RX ADMIN — Medication 5 ML: at 15:40

## 2023-01-30 RX ADMIN — HYDROMORPHONE HYDROCHLORIDE 1 MG: 1 INJECTION, SOLUTION INTRAMUSCULAR; INTRAVENOUS; SUBCUTANEOUS at 13:34

## 2023-01-30 RX ADMIN — SODIUM CHLORIDE, POTASSIUM CHLORIDE, SODIUM LACTATE AND CALCIUM CHLORIDE 1000 ML: 600; 310; 30; 20 INJECTION, SOLUTION INTRAVENOUS at 13:30

## 2023-01-30 ASSESSMENT — PAIN SCALES - GENERAL
PAINLEVEL: WORST PAIN (10)
PAINLEVEL: EXTREME PAIN (8)

## 2023-01-30 NOTE — LETTER
"    1/30/2023         RE: Jennifer Cervantes  [address]  [facility] [address]        Dear Colleague,    Thank you for referring your patient, Jennifer Cervantes, to the [facility] [place]. Please see a copy of my visit note below.    Infusion Nursing Note:  Jennifer Cervantes presents today for IV Fluids and IV Pain Meds. See MAR.     Patient seen by provider today: Yes, Patricia Mantilla   present during visit today: Not Applicable.     Note: Pt arrived to clinic today rating her Sickle Cell Pain an \"9/10\". States the Pain is mostly in her Low Back. IV Fluids administered over 2 hours, along with 3 doses of 1mg IV dilaudid (each given one hour apart). Prior to discharge Pt rating her Pain a \"5/10\".      Intravenous Access:  Implanted Port.     Treatment Conditions:  Not Applicable.     Post Infusion Assessment:  Patient tolerated infusion without incident.  Access discontinued per protocol.      Discharge Plan:   Patient discharged in stable condition accompanied by: self.  Departure Mode: Ambulatory.      Again, thank you for allowing me to participate in the care of your patient.        Sincerely,        B[facility] Infusion Nurse    "

## 2023-01-30 NOTE — TELEPHONE ENCOUNTER
Pt called in to triage requesting IVF pain meds for back pain rated 10/10 x 1 days. Stated last took prn oxycodone this morning without relief. Denied any fevers, chills, cough, sob, chest pain, numbness or tingling or other symptoms not typical of sickle cell pain.   Pt's last infusion was 1/23/23, last clinic visit 1/2/23 with follow-up on 1/30/23 with Patricia Mantilla CNP, today.   Patient states has own ride to the clinic. Ride comes at 1030 for apt with Patricia SANTIAGO today.   Pt needs oxy refill. Will ask CNP to review when pt comes for apt.  Pt qualifies for sickle cell standing order protocol.  If you do not hear from the infusion center by 2pm then you will not be able to get in for an infusion today.     Added to infusion wait list.    BMT can offer 1230 apt today for Jennifer.  Jennifer confirmed she can come to apt  Does not need ride, she has arranged for today.  Updated BMT charge nurse  Message sent to [facility] to schedule.    Routed to Patricia JIMENEZ and MAURISIO Asencio

## 2023-01-30 NOTE — PROGRESS NOTES
"Infusion Nursing Note:  Jennifer Cervantes presents today for IV Fluids and IV Pain Meds. See MAR.     Patient seen by provider today: Yes, Patricia Mantilla   present during visit today: Not Applicable.     Note: Pt arrived to clinic today rating her Sickle Cell Pain an \"9/10\". States the Pain is mostly in her Low Back. IV Fluids administered over 2 hours, along with 3 doses of 1mg IV dilaudid (each given one hour apart). Prior to discharge Pt rating her Pain a \"5/10\".      Intravenous Access:  Implanted Port.     Treatment Conditions:  Not Applicable.     Post Infusion Assessment:  Patient tolerated infusion without incident.  Access discontinued per protocol.      Discharge Plan:   Patient discharged in stable condition accompanied by: self.  Departure Mode: Ambulatory.  "

## 2023-01-30 NOTE — NURSING NOTE
Port accessed with 20g 3/4 inch gripper needle by RN, labs collected, line flushed with saline. Pt checked in for appointment(s).    Stan Siddiqui RN

## 2023-01-30 NOTE — NURSING NOTE
"Oncology Rooming Note    January 30, 2023 11:59 AM   Jennifer Cervantes is a 22 year old female who presents for:    Chief Complaint   Patient presents with     Oncology Clinic Visit     Sickle Cell Crisis     Initial Vitals: /76 (BP Location: Right arm, Patient Position: Sitting, Cuff Size: Adult Regular)   Pulse 88   Temp 97.6  F (36.4  C) (Oral)   Resp 16   Wt 68.9 kg (152 lb)   SpO2 99%   BMI 26.08 kg/m   Estimated body mass index is 26.08 kg/m  as calculated from the following:    Height as of 1/16/23: 1.626 m (5' 4.02\").    Weight as of this encounter: 68.9 kg (152 lb). Body surface area is 1.76 meters squared.  Worst Pain (10) Comment: Data Unavailable   No LMP recorded. Patient has had an injection.  Allergies reviewed: Yes  Medications reviewed: Yes    Medications: MEDICATION REFILLS NEEDED TODAY. Provider was notified.  Pharmacy name entered into [employer] [facility]: [place], [place] - [address] [place] [address] [phone]    Clinical concerns: Pt presents today in a Sickle Cell Crisis.       Stacey Murphy LPN  1/30/2023              "

## 2023-01-30 NOTE — LETTER
Date:January 31, 2023      Provider requested that no letter be sent. Do not send.       [facility]

## 2023-01-30 NOTE — PROGRESS NOTES
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 She had multiple thromboembolic events in 2021 despite adherent anticoagulation use (though warfarin was perpetually low) and there are concerns for chronic thromboembolic disease but did not have pulmonary HTN on a November 2021 cath. She is maintained on chronic PO opioids and twice-weekly infusion visits (since 1/24/22) but has been able to be maintained on this regimen and has stayed out of the ED most of the time with even rarer admissions.     Interval History:  Jennifer is seen today in clinic for hospital follow-up. She was recently admitted 1/8-1/16/23 for acute hypoxic respiratory failure, ACS and acute pulmonary edema. She was hypoxic requiring supplemental O2. She did receive 1 unit of PRBCs as an outpatient 1/18/23 following discharge. Since prior to her hospitalization she has felt \"bubbles\" in her lungs when laying on her left side. She denies any orthopnea or increased coughing. She has been requiring asthma medications more frequently. Today she tried to administer a neb but states her machine is no longer working. She used her rescue inhaler this morning instead which was helpful. She has been having increased back and chest pain over the weekend. She continues to utilize heat and Tylenol prior to oral oxycodone at home.    Today she desires to start tapering down on her oral oxycodone. She continues to look for a job that would allow her to sit for a majority of her shift. She " "has an ultimate goal of moving out of the house and living independently by the end of the year. She recently starting dating someone in late October and has no intention of moving in with her partner any time soon.      Sickle Cell Disease Comprehensive Checklist    Bone Health/Avascular Necrosis Screening/Symptoms (each visit): no new concerns today    Leg Ulcer evaluation (every visit): none    Hypertension (every visit):stable, high normal    Last pulmonary evaluation (asthma, AMAN, pulm HTN): 9/28/22    Stroke/silent cerebral infarct Hx (Y/N): Yes TIA ~2014, first event ~age 1 with full stroke and R sided weakness    Last PCP Visit: 8/2020    Vaccines:  ? PCV13: 5/13/19  ? Pneumovax (PPSV23): 3/04, 10/09, 7/12/19 (next due 7/2024)  ? Menactra: 4/2010, 9/2015 (MCV done 8/16/21)  ? Influenza: 11/17/2022     Audiology (chelation): done 6/2020, normal.. However, on 6/7, \"While there is no significant change in grade on the CTCAE4.03 Scale, there were hearing changes bilaterally for both standard and extended high frequency audiometry.  Will need to determine if an ENT consult is advised due to the asymmetry in extended high frequency testing.\"     Plan last reviewed with patient: 7/11/22    Patient background: 23 yo F, enjoys movies and kids though there are times where she does not really want to talk to people. Does not have a lot of social support at home.     Sickle Cell Disease History  Primary Hematologist Team: Jose Rafael Duncan  PCP: none  Genotype: SS  Acute Pain Crisis Treatment: (avoiding IV opioids for now, which she has agreed to)    ER   o Oxycodone 15-20 mg x1  o Ketamine 4mg IV x 2--helps with opioid sparing  o Toradol 15 mg IV x1   o Maintenance IV fluids with LR  o Other: Zofran 8 mg IV PRN nausea    Inpatient:  o Home oxycodone/Oxycontin regimen, though home oxycodone dosing could be increased to 20 mg to start  o PCA plan:   - None for now  o Other Medications: Zofran  o She has had success with " ketamine starting at 4mg/h and advancing only to 6mg/h, as 8mg/h made her feel quite poorly..  o ASA  o Supportive Care: Docusate, Senna  Chronic Pain Medications:    Home regimen Oxycontin 10 mg q12h, oxycodone 15 mg p.o. q.4-6 hours p.r.n. breakthrough pain.  She also continues on Voltaren gel, and Zoloft among other medications.    -Also benefits from mental health visits, acupuncture  Baseline Hemoglobin: 7 g/dl without chronic transfusions  Hydroxyurea use: Yes  H/O blood transfusions: Yes, several (iron overload) Most recent 11/20/2021    H/O Transfusion Reactions: no    Antibodies:none  H/O Acute Chest Syndrome: Yes    Last episode:9/05/22 (previously 4/26/21, 10/2019)     ICU/intubation: not with 9/2022 admission  H/O Stroke: Yes (managed with chronic transfusions in the past, stopped late Spring 2020)  H/O VTE: Yes (2/2021)  H/O Cholecystectomy or Splenectomy: no  H/O Asthma, Pulm HTN, AVN, Leg Ulcers, Nephropathy, Retinopathy, etc: Iron overload, asthma, chronic lung disease, physical limitations from early stroke    ---------------------------------------  Jennifer Cervantes's Goals (discussed 1/30/23)    1-3 month goal:  Look for a job    6 month goal:  Continue to work on driving     12 month goal:  Move into her own place     Disease-specific goal(s):  Continue to stay out of the hospital, be off regular opioids in the future (previous goal was March 2023, now more likely June 2023).     Start to wean oxycodone.  ---------------------------------------      Current Outpatient Medications   Medication Sig Dispense Refill     acetaminophen (TYLENOL) 325 MG tablet Take 2 tablets (650 mg) by mouth every 6 hours as needed for mild pain 120 tablet 3     albuterol (PROVENTIL) (2.5 MG/3ML) 0.083% neb solution Take 2 vials (5 mg) by nebulization every 6 hours as needed for shortness of breath / dyspnea or wheezing 90 mL 3     aspirin (ASA) 81 MG chewable tablet Take 1 tablet (81 mg) by mouth 2 times daily 60 tablet  11     budesonide-formoterol (SYMBICORT) 160-4.5 MCG/ACT Inhaler Inhale 2 puffs into the lungs 2 times daily 10.2 g 3     deferasirox (JADENU) 360 MG tablet Take 4 tablets (1,440 mg) by mouth every evening 120 tablet 4     diclofenac (VOLTAREN) 1 % topical gel Apply 4 g topically 4 times daily 350 g 0     FLUoxetine (PROZAC) 10 MG capsule Take 1 capsule (10 mg) by mouth daily For two weeks, then increase to 20 mg if 10 mg not effective 40 capsule 1     folic acid (FOLVITE) 1 MG tablet Take 1 tablet (1 mg) by mouth daily 30 tablet 0     Hydroxyurea 1000 MG TABS Take 3,000 mg by mouth daily 90 tablet 3     ondansetron (ZOFRAN) 8 MG tablet Take 1 tablet (8 mg) by mouth every 8 hours as needed 30 tablet 1     oxyCODONE IR (ROXICODONE) 15 MG tablet Take 1 tablet (15 mg) by mouth every 4 hours as needed for severe pain (7-10) Goal 4 per day. Max 6 per day. 35 tablet 0     traZODone (DESYREL) 50 MG tablet Take 1 tablet (50 mg) by mouth At Bedtime 30 tablet 1     EPINEPHrine (ANY BX GENERIC EQUIV) 0.3 MG/0.3ML injection 2-pack Inject 0.3 mLs (0.3 mg) into the muscle as needed for anaphylaxis (Patient not taking: Reported on 1/30/2023) 1 each 1       Past Medical History  Past Medical History:   Diagnosis Date     Anxiety      Bleeding disorder (H)      Blood clotting disorder (H)      Cerebral infarction (H) 2015     Cognitive developmental delay     low IQ. Please recognize when managing pain and planning with her     Depressive disorder      Hemiplegia and hemiparesis following cerebral infarction affecting right dominant side (H)     right hand contractures     Iron overload due to repeated red blood cell transfusions      Migraines      Multiple subsegmental pulmonary emboli without acute cor pulmonale (H) 02/01/2021     Oppositional defiant behavior      Presence of intrauterine contraceptive device 2/18/2020     Superficial venous thrombosis of arm, right 03/25/2021     Uncomplicated asthma      Past Surgical History:    Procedure Laterality Date     AS INSERT TUNNELED CV 2 CATH W/O PORT/PUMP       CHOLECYSTECTOMY       CV RIGHT HEART CATH MEASUREMENTS RECORDED N/A 11/18/2021    Procedure: Right Heart Cath;  Surgeon: Jackson Stauffer MD;  Location:  HEART CARDIAC CATH LAB     INSERT PORT VASCULAR ACCESS Left 4/21/2021    Procedure: INSERTION, VASCULAR ACCESS PORT (NOT SURE ON SIDE UNTIL REMOVAL);  Surgeon: Rajan More MD;  Location: [facility] OR     IR CHEST PORT PLACEMENT > 5 YRS OF AGE  4/21/2021     IR CVC NON TUNNEL LINE REMOVAL  5/6/2021     IR CVC NON TUNNEL PLACEMENT > 5 YRS  04/07/2020     IR CVC NON TUNNEL PLACEMENT > 5 YRS  4/30/2021     IR CVC NON TUNNEL PLACEMENT > 5 YRS  9/7/2022     IR PORT REMOVAL LEFT  4/21/2021     REMOVE PORT VASCULAR ACCESS Left 4/21/2021    Procedure: REMOVAL, VASCULAR ACCESS PORT LEFT;  Surgeon: Rajan More MD;  Location: [facility] OR     REPAIR TENDON ELBOW Right 10/02/2019    Procedure: Right Elbow Flexor Lengthening, Flexor Pronator Slide Of Wrist and Finger, Thumb Adductor Lengthening;  Surgeon: Anai Franco MD;  Location: [facility] OR     TONSILLECTOMY Bilateral 10/02/2019    Procedure: Bilateral Tonsillectomy;  Surgeon: Farhana Guy MD;  Location: [facility] OR     ZZC BREAST REDUCTION (INCLUDES LIPO) TIER 3 Bilateral 04/23/2019     Allergies   Allergen Reactions     Contrast Dye      Hives and breathing issues     Fish-Derived Products Hives     Seafood Hives     Diagnostic X-Ray Materials      Gadolinium      Social History   Social History     Tobacco Use     Smoking status: Never     Smokeless tobacco: Never   Substance Use Topics     Alcohol use: Not Currently     Alcohol/week: 0.0 standard drinks     Drug use: Never    Still living at home with mom and extended family.     Past medical history and social history were reviewed.    Physical Examination:  /76 (BP Location: Right arm, Patient Position: Sitting, Cuff Size: Adult Regular)   Pulse 88   Temp 97.6  F (36.4   C) (Oral)   Resp 16   Wt 68.9 kg (152 lb)   SpO2 99%   BMI 26.08 kg/m    Wt Readings from Last 10 Encounters:   01/30/23 68.9 kg (152 lb)   01/26/23 68.9 kg (152 lb)   01/15/23 68.9 kg (151 lb 14.4 oz)   01/02/23 74.3 kg (163 lb 12.8 oz)   12/24/22 74.8 kg (165 lb)   11/28/22 74.2 kg (163 lb 9.3 oz)   11/03/22 77.1 kg (170 lb)   10/12/22 75 kg (165 lb 4.8 oz)   09/28/22 73.9 kg (163 lb)   09/28/22 74.3 kg (163 lb 14.4 oz)     General: Well-appearing female in no acute distress.  Eyes: EOMI, PERRL. No scleral icterus.  ENT: Oral mucosa is moist without lesions or thrush.   Cardiovascular: RRR No murmurs, gallops, or rubs. No peripheral edema.  Respiratory: Diminished LLL. No wheezes or crackles.  MSK: Contractured right arm and hand 2/2 stroke.  Neurologic: Grossly nonfocal.  Skin: No rashes, petechiae, or bruising noted on exposed skin.    Laboratory Data:  Most Recent 3 CBC's:  Recent Labs   Lab Test 01/30/23  1256 01/18/23  1600 01/16/23  0612 01/09/23  1045 01/08/23  0947   WBC 12.8* 17.0* 13.3*   < > 20.4*   HGB 7.8* 6.3* 5.9*   < > 7.4*   MCV 79 78 82   < > 88   * 694* 485*   < > 411   ANEUTAUTO 10.1* 13.0*  --   --  17.9*    < > = values in this interval not displayed.    Most Recent 3 BMP's:  Recent Labs   Lab Test 01/30/23  1256 01/18/23  1600 01/16/23  0612    136 136   POTASSIUM 4.0 3.4 3.7   CHLORIDE 105 105 101   CO2 21* 21* 22   BUN 4.4* 4.5* 10.3   CR 0.52 0.47* 0.51   ANIONGAP 10 10 13   MICAH 9.3 9.1 9.2   GLC 91 111* 94   PROTTOTAL 7.9 7.6 7.1   ALBUMIN 4.5 4.2 3.9    Most Recent 2 LFT's:  Recent Labs   Lab Test 01/30/23  1256 01/18/23  1600   AST 26 29   ALT 15 18   ALKPHOS 84 76   BILITOTAL 1.8* 3.4*      Lab Results   Component Value Date    RETP 7.0 (H) 01/30/2023     Most recent labs reviewed and interpreted by me today.      Assessment and Plan:  1. Sickle Cell HgbSS Disease  2. Recent hospitalization with acute chest, pulmonary edema (1/2023)  3. Chronic Pain  4. Iron  overload  5. 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 History of CVA  7. Hearing loss    Jennifer is recovering well since her hospitalization in early January. Her dyspnea and cough have improved although she continues to feel slight left chest fullness when lying flat. She is in need of a new nebulizer machine which I will order today. She states her current machine was ordered when she was in pediatric care.     She feels motivated to begin tapering down her PRN oxycodone which we will start today. She maintains the goal of being off of oxycodone completely by June of this year. At this time we will continue to maintain her infusion schedule at 2 days/week if needed.    Labs today show improvement in her hemgolobin to 7.8 following her last transfusion. She will continue Desferal infusions though [facility], next due tomorrow.       Plan:  -Continue Hydrea to 3000mg daily to help lessen frequency of sickle cell pain  -Begin taper of home Oxycodone prescription today with an Rx of 35 tabs/week rather than 40 tablets. She can self-reduce infusion days/week but continue to keep the cap at 2/week for now. We should continue to reassess her ability to taper with future refill requests. Dr. Duncan was updated on this in clinic today as well.  -Infusion center visits limited to two times per week (Mondays and Fridays).Continue diligent home management with current medications, heat, rest, compression, warm baths.   -If unable to manage at home can go to ED but continue to not do IV narcotics in the emergency room. Ketamine previously added to pain plan in ED  -Continue home Desferal infusions.Last Ferriscan shows decreased iron deposition in liver, 31.6 mg/g dry tissue, previously greater than 43.   -Continue aspirin BID  -RTC in 6-8 weeks with Dr. Duncan.     45 minutes spent on the date of the encounter doing chart review, review of test results, interpretation of tests, patient visit,  documentation and discussion with other provider(s)     Patricia Mantilla CNP  [facility] [address] [address] [phone]

## 2023-01-30 NOTE — Clinical Note
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 She had multiple thromboembolic events in 2021 despite adherent anticoagulation use (though warfarin was perpetually low) and there are concerns for chronic thromboembolic disease but did not have pulmonary HTN on a November 2021 cath. She is maintained on chronic PO opioids and twice-weekly infusion visits (since 1/24/22) but has been able to be maintained on this regimen and has stayed out of the ED most of the time with even rarer admissions.    Interval History:  Neb not working  Rescue inhaler  Left side         Sickle Cell Disease Comprehensive Checklist    Bone Health/Avascular Necrosis Screening/Symptoms (each visit): no new concerns today    Leg Ulcer evaluation (every visit): none    Hypertension (every visit):stable, high normal    Last pulmonary evaluation (asthma, AMAN, pulm HTN): 9/28/22    Stroke/silent cerebral infarct Hx (Y/N): Yes TIA ~2014, first event ~age 1 with full stroke and R sided weakness    Last PCP Visit: 8/2020    Vaccines:  ? PCV13: 5/13/19  ? Pneumovax (PPSV23): 3/04, 10/09, 7/12/19 (next due 7/2024)  ? Menactra: 4/2010, 9/2015 (MCV done 8/16/21)  ? Influenza: 11/17/2022    Audiology (chelation): done 6/2020, normal.. However, on 6/7, \"While there is no significant change in grade on the CTCAE4.03 Scale, there were hearing changes bilaterally for both standard and extended high " "frequency audiometry.  Will need to determine if an ENT consult is advised due to the asymmetry in extended high frequency testing.\"     Plan last reviewed with patient: 7/11/22    Patient background: 21 yo F, enjoys movies and kids though there are times where she does not really want to talk to people. Does not have a lot of social support at home.     Sickle Cell Disease History  Primary Hematologist Team: Jose Rafael Duncan  PCP: none  Genotype: SS  Acute Pain Crisis Treatment: (avoiding IV opioids for now, which she has agreed to)    ER   o Oxycodone 15-20 mg x1  o Ketamine 4mg IV x 2--helps with opioid sparing  o Toradol 15 mg IV x1   o Maintenance IV fluids with LR  o Other: Zofran 8 mg IV PRN nausea    Inpatient:  o Home oxycodone/Oxycontin regimen, though home oxycodone dosing could be increased to 20 mg to start  o PCA plan:   - None for now  o Other Medications: Zofran  o She has had success with ketamine starting at 4mg/h and advancing only to 6mg/h, as 8mg/h made her feel quite poorly..  o ASA  o Supportive Care: Docusate, Senna  Chronic Pain Medications:    Home regimen Oxycontin 10 mg q12h, oxycodone 15 mg p.o. q.4-6 hours p.r.n. breakthrough pain.  She also continues on Voltaren gel, and Zoloft among other medications.    -Also benefits from mental health visits, acupuncture  Baseline Hemoglobin: 7 g/dl without chronic transfusions  Hydroxyurea use: Yes  H/O blood transfusions: Yes, several (iron overload) Most recent 11/20/2021    H/O Transfusion Reactions: no    Antibodies:none  H/O Acute Chest Syndrome: Yes    Last episode:9/05/22 (previously 4/26/21, 10/2019)     ICU/intubation: not with 9/2022 admission  H/O Stroke: Yes (managed with chronic transfusions in the past, stopped late Spring 2020)  H/O VTE: Yes (2/2021)  H/O Cholecystectomy or Splenectomy: no  H/O Asthma, Pulm HTN, AVN, Leg Ulcers, Nephropathy, Retinopathy, etc: Iron overload, asthma, chronic lung disease, physical limitations from " early stroke    ---------------------------------------  Jennifer Cervantes's Goals (discussed 11/17/22)    1-3 month goal:  Look     6 month goal:  Continue to work on driving     12 month goal:  Move into her own place     Disease-specific goal(s):  Continue to stay out of the hospital, be off regular opioids in the future (previous goal was March 2023, now more likely June 2023)  ---------------------------------------      Current Outpatient Medications   Medication Sig Dispense Refill     acetaminophen (TYLENOL) 325 MG tablet Take 2 tablets (650 mg) by mouth every 6 hours as needed for mild pain 120 tablet 3     albuterol (PROVENTIL) (2.5 MG/3ML) 0.083% neb solution Take 2 vials (5 mg) by nebulization every 6 hours as needed for shortness of breath / dyspnea or wheezing 90 mL 3     aspirin (ASA) 81 MG chewable tablet Take 1 tablet (81 mg) by mouth 2 times daily 60 tablet 11     budesonide-formoterol (SYMBICORT) 160-4.5 MCG/ACT Inhaler Inhale 2 puffs into the lungs 2 times daily 10.2 g 3     deferasirox (JADENU) 360 MG tablet Take 4 tablets (1,440 mg) by mouth every evening 120 tablet 4     diclofenac (VOLTAREN) 1 % topical gel Apply 4 g topically 4 times daily 350 g 0     EPINEPHrine (ANY BX GENERIC EQUIV) 0.3 MG/0.3ML injection 2-pack Inject 0.3 mLs (0.3 mg) into the muscle as needed for anaphylaxis (Patient not taking: Reported on 1/19/2023) 1 each 1     FLUoxetine (PROZAC) 10 MG capsule Take 1 capsule (10 mg) by mouth daily For two weeks, then increase to 20 mg if 10 mg not effective 40 capsule 1     folic acid (FOLVITE) 1 MG tablet Take 1 tablet (1 mg) by mouth daily 30 tablet 0     Hydroxyurea 1000 MG TABS Take 3,000 mg by mouth daily 90 tablet 3     ondansetron (ZOFRAN) 8 MG tablet Take 1 tablet (8 mg) by mouth every 8 hours as needed 30 tablet 1     oxyCODONE HCl (ROXICODONE) 20 MG TABS immediate release tablet Take 20 mg by mouth every 4 hours as needed (moderate to severe pain (pain level 5-10)) 40 tablet 0      oxyCODONE IR (ROXICODONE) 15 MG tablet Take 1 tablet (15 mg) by mouth every 4 hours as needed for severe pain (7-10) Goal 4 per day. Max 6 per day. 40 tablet 0     traZODone (DESYREL) 50 MG tablet Take 1 tablet (50 mg) by mouth At Bedtime 30 tablet 1       Past Medical History  Past Medical History:   Diagnosis Date     Anxiety      Bleeding disorder (H)      Blood clotting disorder (H)      Cerebral infarction (H) 2015     Cognitive developmental delay     low IQ. Please recognize when managing pain and planning with her     Depressive disorder      Hemiplegia and hemiparesis following cerebral infarction affecting right dominant side (H)     right hand contractures     Iron overload due to repeated red blood cell transfusions      Migraines      Multiple subsegmental pulmonary emboli without acute cor pulmonale (H) 02/01/2021     Oppositional defiant behavior      Presence of intrauterine contraceptive device 2/18/2020     Superficial venous thrombosis of arm, right 03/25/2021     Uncomplicated asthma      Past Surgical History:   Procedure Laterality Date     AS INSERT TUNNELED CV 2 CATH W/O PORT/PUMP       CHOLECYSTECTOMY       CV RIGHT HEART CATH MEASUREMENTS RECORDED N/A 11/18/2021    Procedure: Right Heart Cath;  Surgeon: Jackson Stauffer MD;  Location:  HEART CARDIAC CATH LAB     INSERT PORT VASCULAR ACCESS Left 4/21/2021    Procedure: INSERTION, VASCULAR ACCESS PORT (NOT SURE ON SIDE UNTIL REMOVAL);  Surgeon: Rajan More MD;  Location: [facility] OR     IR CHEST PORT PLACEMENT > 5 YRS OF AGE  4/21/2021     IR CVC NON TUNNEL LINE REMOVAL  5/6/2021     IR CVC NON TUNNEL PLACEMENT > 5 YRS  04/07/2020     IR CVC NON TUNNEL PLACEMENT > 5 YRS  4/30/2021     IR CVC NON TUNNEL PLACEMENT > 5 YRS  9/7/2022     IR PORT REMOVAL LEFT  4/21/2021     REMOVE PORT VASCULAR ACCESS Left 4/21/2021    Procedure: REMOVAL, VASCULAR ACCESS PORT LEFT;  Surgeon: Rajan More MD;  Location: [facility] OR     REPAIR TENDON ELBOW Right  10/02/2019    Procedure: Right Elbow Flexor Lengthening, Flexor Pronator Slide Of Wrist and Finger, Thumb Adductor Lengthening;  Surgeon: Anai Franco MD;  Location: [facility] OR     TONSILLECTOMY Bilateral 10/02/2019    Procedure: Bilateral Tonsillectomy;  Surgeon: Farhana Guy MD;  Location: [facility] OR     ZZC BREAST REDUCTION (INCLUDES LIPO) TIER 3 Bilateral 04/23/2019     Allergies   Allergen Reactions     Contrast Dye      Hives and breathing issues     Fish-Derived Products Hives     Seafood Hives     Diagnostic X-Ray Materials      Gadolinium      Social History   Social History     Tobacco Use     Smoking status: Never     Smokeless tobacco: Never   Substance Use Topics     Alcohol use: Not Currently     Alcohol/week: 0.0 standard drinks     Drug use: Never    Still living at home with mom and extended family.     Past medical history and social history were reviewed.    Physical Examination:  There were no vitals taken for this visit.  Wt Readings from Last 10 Encounters:   01/26/23 68.9 kg (152 lb)   01/15/23 68.9 kg (151 lb 14.4 oz)   01/02/23 74.3 kg (163 lb 12.8 oz)   12/24/22 74.8 kg (165 lb)   11/28/22 74.2 kg (163 lb 9.3 oz)   11/03/22 77.1 kg (170 lb)   10/12/22 75 kg (165 lb 4.8 oz)   09/28/22 73.9 kg (163 lb)   09/28/22 74.3 kg (163 lb 14.4 oz)   09/26/22 73.8 kg (162 lb 12.8 oz)     General: Well-appearing female in no acute distress.  Eyes: EOMI, PERRL. No scleral icterus.  ENT: Oral mucosa is moist without lesions or thrush.   Cardiovascular: RRR No murmurs, gallops, or rubs. No peripheral edema.  Respiratory: CTA bilaterally. No wheezes or crackles.  MSK: Contractured right arm and hand 2/2 stroke.  Neurologic: Grossly nonfocal.  Skin: No rashes, petechiae, or bruising noted on exposed skin.    Laboratory Data:  ***    Most recent labs reviewed and interpreted by me today.      Assessment and Plan:  1. Sickle Cell HgbSS Disease  2. Recent hospitalization with acute chest  (9/2022)  3. Chronic Pain  4. 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 History of CVA  7. Hearing loss      Jennifer has been feeling well since her last hospitalization with only one ED visit last month. She continues to optimize her home pain regimen in an effort to avoid ED visits. Due to the recent seasonal weather change she has found it difficult to continue to wean off of oxycodone but maintains a goal to be off or regular opioids by June 2023. We will keep her infusion center visits limited to twice per week. This schedule has worked well for her for many months.    She would like to resume Desferal infusions and stop deferiprone which seems reasonable. I will confirm this plan with Dr. Duncan. Her repeat Ferriscan performed today showed decreased iron deposition in the liver compared to her last MRI in January 2022.     She is agreeable to receive an influenza vaccine in infusion today.     Plan:  -Continue Hydrea to 3000mg daily to help lessen frequency of sickle cell pain  -Continue Oxycodone at home with Rx of 40 tabs/week. She can self-reduce infusion days/week but continue to keep the cap at 2/week for now  -Infusion center visits limited to two times per week (Mondays and Fridays).Continue diligent home management with current medications, heat, rest, compression, warm baths.   -If unable to manage at home can go to ED but continue to not do IV narcotics in the emergency room. Ketamine previously added to pain plan in ED  -Discontinue deferiprone and resume Desferal infusions. Ferriscan today shows decreased iron deposition in liver, 31.6 mg/g dry tissue, previously greater than 43.   -Continue aspirin BID  -RTC in 4-6 weeks with Dr. Duncan.     *** minutes spent on the date of the encounter doing {2021 E&M time in:[id number]}     Patricia Mantilla CNP  [facility] [address] [address] [phone]          Patricia Mantilla CNP

## 2023-01-31 ENCOUNTER — PATIENT OUTREACH (OUTPATIENT)
Dept: CARE COORDINATION | Facility: CLINIC | Age: 23
End: 2023-01-31
Payer: COMMERCIAL

## 2023-01-31 NOTE — PROGRESS NOTES
Social Work Intervention  [facility]    Data/Intervention:    Patient Name:  Jennifer Cervantes DOB/Age:  2000 (22 year old)    Visit Type: telephone  Referral Source: [facility]  Reason for Referral:  Ride Assistance    Collaborated With:    -Patient    Psychosocial Information/Concerns:  SW received in-basket message that patient has an appointment on 23 and will need assistance in arranging ride.    Intervention/Education/Resources Provided:  SW called patient, introduced self and explained the reason for calling. Patient reported feeling comfortable calling [employer] ([phone]) closer to the appointment date and arranging ride on their own.    Assessment/Plan:  SW encouraged patient to reach out if they run into any issues. SW will continue to remain available as needed. Provided patient/family with contact information and availability.    CARLOS Chavez,UDAY  Hematology/Oncology Social Worker  Phone:[phone] Pager: [phone]

## 2023-02-02 ENCOUNTER — PRE VISIT (OUTPATIENT)
Dept: ORTHOPEDICS | Facility: CLINIC | Age: 23
End: 2023-02-02

## 2023-02-02 ENCOUNTER — INFUSION THERAPY VISIT (OUTPATIENT)
Dept: TRANSPLANT | Facility: CLINIC | Age: 23
End: 2023-02-02
Attending: PEDIATRICS
Payer: COMMERCIAL

## 2023-02-02 ENCOUNTER — OFFICE VISIT (OUTPATIENT)
Dept: ORTHOPEDICS | Facility: CLINIC | Age: 23
End: 2023-02-02
Attending: PEDIATRICS
Payer: COMMERCIAL

## 2023-02-02 ENCOUNTER — OFFICE VISIT (OUTPATIENT)
Dept: ONCOLOGY | Facility: CLINIC | Age: 23
End: 2023-02-02
Attending: STUDENT IN AN ORGANIZED HEALTH CARE EDUCATION/TRAINING PROGRAM
Payer: COMMERCIAL

## 2023-02-02 ENCOUNTER — NURSE TRIAGE (OUTPATIENT)
Dept: ONCOLOGY | Facility: CLINIC | Age: 23
End: 2023-02-02

## 2023-02-02 VITALS
RESPIRATION RATE: 16 BRPM | HEART RATE: 96 BPM | WEIGHT: 155 LBS | OXYGEN SATURATION: 97 % | DIASTOLIC BLOOD PRESSURE: 78 MMHG | SYSTOLIC BLOOD PRESSURE: 119 MMHG | BODY MASS INDEX: 26.59 KG/M2 | TEMPERATURE: 98.2 F

## 2023-02-02 DIAGNOSIS — I69.351 HEMIPLEGIA AND HEMIPARESIS FOLLOWING CEREBRAL INFARCTION AFFECTING RIGHT DOMINANT SIDE (H): ICD-10-CM

## 2023-02-02 DIAGNOSIS — G81.10 SPASTIC HEMIPLEGIA, UNSPECIFIED ETIOLOGY, UNSPECIFIED LATERALITY (H): Primary | ICD-10-CM

## 2023-02-02 DIAGNOSIS — D57.00 SICKLE CELL PAIN CRISIS (H): ICD-10-CM

## 2023-02-02 DIAGNOSIS — I69.351 SPASTIC HEMIPLEGIA OF RIGHT DOMINANT SIDE AS LATE EFFECT OF CEREBRAL INFARCTION (H): ICD-10-CM

## 2023-02-02 PROCEDURE — G0463 HOSPITAL OUTPT CLINIC VISIT: HCPCS | Mod: 25

## 2023-02-02 PROCEDURE — G0463 HOSPITAL OUTPT CLINIC VISIT: HCPCS

## 2023-02-02 PROCEDURE — 258N000003 HC RX IP 258 OP 636: Performed by: PEDIATRICS

## 2023-02-02 PROCEDURE — 64644 CHEMODENERV 1 EXTREM 5/> MUS: CPT

## 2023-02-02 PROCEDURE — 99213 OFFICE O/P EST LOW 20 MIN: CPT | Performed by: ORTHOPAEDIC SURGERY

## 2023-02-02 PROCEDURE — 64643 CHEMODENERV 1 EXTREM 1-4 EA: CPT

## 2023-02-02 PROCEDURE — 250N000020 HC RX IP 250 OP 636 J0585: Performed by: PEDIATRICS

## 2023-02-02 PROCEDURE — 250N000011 HC RX IP 250 OP 636: Performed by: PEDIATRICS

## 2023-02-02 PROCEDURE — 95874 GUIDE NERV DESTR NEEDLE EMG: CPT | Performed by: STUDENT IN AN ORGANIZED HEALTH CARE EDUCATION/TRAINING PROGRAM

## 2023-02-02 PROCEDURE — 64644 CHEMODENERV 1 EXTREM 5/> MUS: CPT | Performed by: STUDENT IN AN ORGANIZED HEALTH CARE EDUCATION/TRAINING PROGRAM

## 2023-02-02 RX ORDER — DIPHENHYDRAMINE HCL 25 MG
25 CAPSULE ORAL
Status: CANCELLED
Start: 2023-07-01

## 2023-02-02 RX ORDER — HEPARIN SODIUM (PORCINE) LOCK FLUSH IV SOLN 100 UNIT/ML 100 UNIT/ML
5 SOLUTION INTRAVENOUS
Status: CANCELLED | OUTPATIENT
Start: 2023-04-01

## 2023-02-02 RX ORDER — ONDANSETRON 8 MG/1
8 TABLET, FILM COATED ORAL
Status: CANCELLED
Start: 2023-04-01

## 2023-02-02 RX ORDER — HEPARIN SODIUM (PORCINE) LOCK FLUSH IV SOLN 100 UNIT/ML 100 UNIT/ML
5 SOLUTION INTRAVENOUS
Status: CANCELLED | OUTPATIENT
Start: 2023-07-01

## 2023-02-02 RX ORDER — DIPHENHYDRAMINE HCL 25 MG
25 CAPSULE ORAL
Status: CANCELLED
Start: 2023-04-01

## 2023-02-02 RX ORDER — ONDANSETRON 8 MG/1
8 TABLET, FILM COATED ORAL
Status: CANCELLED
Start: 2023-07-01

## 2023-02-02 RX ORDER — HEPARIN SODIUM,PORCINE 10 UNIT/ML
5 VIAL (ML) INTRAVENOUS
Status: CANCELLED | OUTPATIENT
Start: 2023-04-01

## 2023-02-02 RX ORDER — HEPARIN SODIUM,PORCINE 10 UNIT/ML
5 VIAL (ML) INTRAVENOUS
Status: CANCELLED | OUTPATIENT
Start: 2023-07-01

## 2023-02-02 RX ORDER — ONDANSETRON 8 MG/1
8 TABLET, ORALLY DISINTEGRATING ORAL
Status: DISCONTINUED | OUTPATIENT
Start: 2023-02-02 | End: 2023-02-02 | Stop reason: HOSPADM

## 2023-02-02 RX ADMIN — SODIUM CHLORIDE, POTASSIUM CHLORIDE, SODIUM LACTATE AND CALCIUM CHLORIDE 1000 ML: 600; 310; 30; 20 INJECTION, SOLUTION INTRAVENOUS at 12:18

## 2023-02-02 RX ADMIN — ONABOTULINUMTOXINA 200 UNITS: 100 INJECTION, POWDER, LYOPHILIZED, FOR SOLUTION INTRADERMAL; INTRAMUSCULAR at 11:34

## 2023-02-02 RX ADMIN — HYDROMORPHONE HYDROCHLORIDE 1 MG: 1 INJECTION, SOLUTION INTRAMUSCULAR; INTRAVENOUS; SUBCUTANEOUS at 14:31

## 2023-02-02 RX ADMIN — HYDROMORPHONE HYDROCHLORIDE 1 MG: 1 INJECTION, SOLUTION INTRAMUSCULAR; INTRAVENOUS; SUBCUTANEOUS at 12:18

## 2023-02-02 RX ADMIN — HYDROMORPHONE HYDROCHLORIDE 1 MG: 1 INJECTION, SOLUTION INTRAMUSCULAR; INTRAVENOUS; SUBCUTANEOUS at 13:16

## 2023-02-02 ASSESSMENT — ENCOUNTER SYMPTOMS
COUGH: 0
BACK PAIN: 1
STIFFNESS: 0
MYALGIAS: 1
MUSCLE WEAKNESS: 0
HEMOPTYSIS: 0
SPUTUM PRODUCTION: 0
SHORTNESS OF BREATH: 1
SNORES LOUDLY: 0
COUGH DISTURBING SLEEP: 0
NECK PAIN: 0
DYSPNEA ON EXERTION: 0
ARTHRALGIAS: 0
POSTURAL DYSPNEA: 0
JOINT SWELLING: 0
WHEEZING: 0
MUSCLE CRAMPS: 0

## 2023-02-02 ASSESSMENT — PAIN SCALES - GENERAL: PAINLEVEL: WORST PAIN (10)

## 2023-02-02 NOTE — PROGRESS NOTES
Service Date: 2023    HISTORY OF PRESENT ILLNESS:  Himanshu is a 22-year-old female who I had previously treated in 2018 with a right elbow flexor pronator release, brachioradialis lengthening, biceps tenotomy, and thenar release.  She has sickle cell disease.  Resultantly, at age 1 or 1 years old she had a stroke and has a right hemiplegia.    She reports she has continued problems with her sickle cell.  She was recently hospitalized.  She has had pneumonia.  Her elbow and hand have gotten worse.  She does get Botox injection and that helped.  She will get of Botox injection later today.    PHYSICAL EXAMINATION:  On examination today, there is loss of passive range of motion since I saw her last.  She lacks 75 degrees from full extension.  She has full elbow flexion.  She lacks supination beyond neutral.  Her wrist sits in 70 degrees of flexion and 90 degrees with the fingers fully extended.    IMPRESSION:  A 22-year-old female with right spastic hemiplegia secondary to stroke with sickle cell disease, status post surgical releases, now with significant recurrent contractures.    PLAN:  Because of her multiple medical problems, she has not been able to be involved in a therapy program.  We would like to have her see our therapist and get enrolled again with a wrist-hand orthosis and passive range of motion.  She has been thinking about whether she needs a bone surgery.  I did discuss with her the possibility of a bone fusion.  She thinks that might be helpful.  I will check her back in 6 months after she re-engages in therapy and will discuss possible further surgical intervention after she has thought over the information we discussed today.    Anai Franco MD        D: 2023   T: 2023   MT: oksana    Name:     HIMANSHU AL  MRN:      [id number]        Account:      [id number]   :      2000           Service Date: 2023       Document: [id number]

## 2023-02-02 NOTE — TELEPHONE ENCOUNTER
Pt called in to triage requesting IVF pain meds for all in R side pain rated 9/10 x since last night days. Stated last took prn oxycodone, 0600 this morning without relief. Denied any fevers, chills, cough, sob, chest pain, numbness or tingling or other symptoms not typical of sickle cell pain.   Pt's last infusion was 1/30/23, last clinic visit 1/30/23 with follow-up on not scheduled.    Patient states she has apts at the clinic today at 8:10 and 11.    Pt denied being out of home medications and needing any refills today.   Pt qualifies for sickle cell standing order protocol.    BMT: 1200  Called and LVM for Jennifer that we have available apt at 1200 for her.

## 2023-02-02 NOTE — NURSING NOTE
Reason For Visit:   Chief Complaint   Patient presents with     Consult     Spastic hemiplegia of left dominant side as late effect of other cerebrovascular disease.  Main concern is right arm usage.       Primary MD: Suraj Case  Ref. MD: Charlotte    Age: 22 year old    ?  No      There were no vitals taken for this visit.      Pain Assessment  Patient Currently in Pain: Yes  0-10 Pain Scale: 8 (more pain with activites)  Primary Pain Location: Arm (Right)  Pain Descriptors: Intermittent, Sharp    Hand Dominance Evaluation  Hand Dominance: Left          QuickDASH Assessment  QuickDASH Main 2/2/2023   1. Open a tight or new jar. No difficulty   2. Do heavy household chores (e.g., wash walls, floors) No difficulty   3. Carry a shopping bag or briefcase. No difficulty   4. Wash your back. No difficulty   5. Use a knife to cut food. Mild difficulty   6. Recreational activities in which you take some force or impact through your arm, shoulder or hand (e.g., golf, hammering, tennis, etc.). Mild difficulty   7. During the past week, to what extent has your arm, shoulder or hand problem interfered with your normal social activities with family, friends, neighbours or groups? Slightly   8. During the past week, were you limited in your work or other regular daily activities as a result of your arm, shoulder or hand problem? Slightly limited   9. Arm, shoulder or hand pain. Mild   10.Tingling (pins and needles) in your arm,shoulder or hand. None   11. During the past week, how much difficulty have you had sleeping because of the pain in your arm, shoulder or hand? No difficulty   Quickdash Ability Score 11.36          Current Outpatient Medications   Medication Sig Dispense Refill     acetaminophen (TYLENOL) 325 MG tablet Take 2 tablets (650 mg) by mouth every 6 hours as needed for mild pain 120 tablet 3     albuterol (PROVENTIL) (2.5 MG/3ML) 0.083% neb solution Take 2 vials (5 mg) by nebulization every 6  hours as needed for shortness of breath / dyspnea or wheezing 90 mL 3     aspirin (ASA) 81 MG chewable tablet Take 1 tablet (81 mg) by mouth 2 times daily 60 tablet 11     budesonide-formoterol (SYMBICORT) 160-4.5 MCG/ACT Inhaler Inhale 2 puffs into the lungs 2 times daily 10.2 g 3     deferasirox (JADENU) 360 MG tablet Take 4 tablets (1,440 mg) by mouth every evening 120 tablet 4     diclofenac (VOLTAREN) 1 % topical gel Apply 4 g topically 4 times daily 350 g 0     EPINEPHrine (ANY BX GENERIC EQUIV) 0.3 MG/0.3ML injection 2-pack Inject 0.3 mLs (0.3 mg) into the muscle as needed for anaphylaxis (Patient not taking: Reported on 1/30/2023) 1 each 1     FLUoxetine (PROZAC) 10 MG capsule Take 1 capsule (10 mg) by mouth daily For two weeks, then increase to 20 mg if 10 mg not effective 40 capsule 1     folic acid (FOLVITE) 1 MG tablet Take 1 tablet (1 mg) by mouth daily 30 tablet 0     Hydroxyurea 1000 MG TABS Take 3,000 mg by mouth daily 90 tablet 3     ondansetron (ZOFRAN) 8 MG tablet Take 1 tablet (8 mg) by mouth every 8 hours as needed 30 tablet 1     oxyCODONE IR (ROXICODONE) 15 MG tablet Take 1 tablet (15 mg) by mouth every 4 hours as needed for severe pain (7-10) Goal 4 per day. Max 6 per day. 35 tablet 0     traZODone (DESYREL) 50 MG tablet Take 1 tablet (50 mg) by mouth At Bedtime 30 tablet 1       Allergies   Allergen Reactions     Contrast Dye      Hives and breathing issues     Fish-Derived Products Hives     Seafood Hives     Diagnostic X-Ray Materials      Gadolinium        MILKA STRATTON, ATC

## 2023-02-02 NOTE — NURSING NOTE
"Oncology Rooming Note    February 2, 2023 11:03 AM   Jennifer Cervantes is a 22 year old female who presents for:    Chief Complaint   Patient presents with     Oncology Clinic Visit     Botox     Initial Vitals: /78 (BP Location: Left arm, Patient Position: Sitting, Cuff Size: Adult Large)   Pulse 96   Temp 98.2  F (36.8  C) (Oral)   Resp 16   Wt 70.3 kg (155 lb)   SpO2 97%   BMI 26.59 kg/m   Estimated body mass index is 26.59 kg/m  as calculated from the following:    Height as of 1/16/23: 1.626 m (5' 4.02\").    Weight as of this encounter: 70.3 kg (155 lb). Body surface area is 1.78 meters squared.  Worst Pain (10) Comment: Data Unavailable   No LMP recorded. Patient has had an injection.  Allergies reviewed: Yes  Medications reviewed: Yes    Medications: Medication refills not needed today.  Pharmacy name entered into [employer]: [facility] [place], [place] - [address] [place] [address] [phone]    Clinical concerns: Pt presents today for f/u.       Stacey Murphy LPN  2/2/2023              "

## 2023-02-02 NOTE — PROGRESS NOTES
PM&R Botox Procedure Note    Chief Complaint: Spastic Hemiparesis    /78 (BP Location: Left arm, Patient Position: Sitting, Cuff Size: Adult Large)   Pulse 96   Temp 98.2  F (36.8  C) (Oral)   Resp 16   Wt 70.3 kg (155 lb)   SpO2 97%   BMI 26.59 kg/m         Current Outpatient Medications:      acetaminophen (TYLENOL) 325 MG tablet, Take 2 tablets (650 mg) by mouth every 6 hours as needed for mild pain, Disp: 120 tablet, Rfl: 3     albuterol (PROVENTIL) (2.5 MG/3ML) 0.083% neb solution, Take 2 vials (5 mg) by nebulization every 6 hours as needed for shortness of breath / dyspnea or wheezing, Disp: 90 mL, Rfl: 3     aspirin (ASA) 81 MG chewable tablet, Take 1 tablet (81 mg) by mouth 2 times daily, Disp: 60 tablet, Rfl: 11     budesonide-formoterol (SYMBICORT) 160-4.5 MCG/ACT Inhaler, Inhale 2 puffs into the lungs 2 times daily, Disp: 10.2 g, Rfl: 3     deferasirox (JADENU) 360 MG tablet, Take 4 tablets (1,440 mg) by mouth every evening, Disp: 120 tablet, Rfl: 4     diclofenac (VOLTAREN) 1 % topical gel, Apply 4 g topically 4 times daily, Disp: 350 g, Rfl: 0     FLUoxetine (PROZAC) 10 MG capsule, Take 1 capsule (10 mg) by mouth daily For two weeks, then increase to 20 mg if 10 mg not effective, Disp: 40 capsule, Rfl: 1     folic acid (FOLVITE) 1 MG tablet, Take 1 tablet (1 mg) by mouth daily, Disp: 30 tablet, Rfl: 0     Hydroxyurea 1000 MG TABS, Take 3,000 mg by mouth daily, Disp: 90 tablet, Rfl: 3     ondansetron (ZOFRAN) 8 MG tablet, Take 1 tablet (8 mg) by mouth every 8 hours as needed, Disp: 30 tablet, Rfl: 1     oxyCODONE IR (ROXICODONE) 15 MG tablet, Take 1 tablet (15 mg) by mouth every 4 hours as needed for severe pain (7-10) Goal 4 per day. Max 6 per day., Disp: 35 tablet, Rfl: 0     traZODone (DESYREL) 50 MG tablet, Take 1 tablet (50 mg) by mouth At Bedtime, Disp: 30 tablet, Rfl: 1     EPINEPHrine (ANY BX GENERIC EQUIV) 0.3 MG/0.3ML injection 2-pack, Inject 0.3 mLs (0.3 mg) into the muscle as  needed for anaphylaxis, Disp: 1 each, Rfl: 1    Current Facility-Administered Medications:      botulinum toxin type A (BOTOX) 100 units injection 200 Units, 200 Units, Intramuscular, Q90 Days, Eric Duncan MD, 200 Units at 02/02/23 1134     medroxyPROGESTERone (DEPO-PROVERA) injection 150 mg, 150 mg, Intramuscular, Q90 Days, Suraj Case MD, 150 mg at 12/30/22 1348    Facility-Administered Medications Ordered in Other Visits:      HYDROmorphone (DILAUDID) injection 1 mg, 1 mg, Intravenous, Q1H PRN, Eric Duncan MD, 1 mg at 02/02/23 1218     lactated ringers BOLUS 1,000 mL, 1,000 mL, Intravenous, Once, Eric Duncan MD, Last Rate: 500 mL/hr at 02/02/23 1218, 1,000 mL at 02/02/23 1218     ondansetron (ZOFRAN ODT) ODT tab 8 mg, 8 mg, Oral, Once PRN, Eric Duncan MD        Allergies   Allergen Reactions     Contrast Dye      Hives and breathing issues     Fish-Derived Products Hives     Seafood Hives     Diagnostic X-Ray Materials      Gadolinium         PHYSICAL EXAM  Motor: 4+/5 with right shoulder abduction, 4/5 with right elbow flexion, unable to assess wrist flexion due to contracture. 4/5 with  strength on right. 5/5 left shoulder abduction, elbow extension, wrist extension and  strength/finger intrinsics. 4/5 with right hip flexion, 4/5 with right knee extension, 3/5 with right ankle dorsiflexion, 4+/5 with right EHL, plantar flexion. 5/5 with all muscle groups in left lower extremity.  ROM is 120 degrees with right shoulder abduction, about 130 degrees with right elbow extension.   Tone with MAS- 2/4 with right shoulder abduction, 3/4 with right finger flexors/intrinsics, 2/4 with right knee flexion. Significant right wrist contracture with minimal extension.  Sensory: intact to light touch throughout all dermatomes in bilateral lower extremities.      HPI  Last set of injections were on 10/14/22.  Patient has been ok since her last visit. Continues with  range of motion exercises at home.   RESPONSE TO PREVIOUS TREATMENT:  Patient reports significant noticeable improvement in right upper and lower extremity range of motion since her last visit. About 60%. She states that she has not noticed increased wearing off of the effects of the medication.    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    Her baseline symptoms have been recalcitrant to oral medications and conservative therapy.  She is here today for reinjection with Botox.    GOAL OF PROCEDURE:  The goal of this procedure is to improve increase active range of motion, improve volitional motor control, decrease pain  and enhance functional independence associated with spasticity.    TOTAL DOSE ADMINISTERED:  Dose Administered:  200 units Botox (Botulinum Toxin Type A)       1:1 Dilution     Diluent Used:  Preservative Free Normal Saline  Total Volume of Diluent Used:  2 ml      CONSENT:  The risks, benefits, and treatment options were discussed with Jennifer Cervantes and she agreed to proceed.    EQUIPMENT USED:  Needle-37mm stimulating/recording  EMG/NCS Machine    SKIN PREPARATION:  Skin preparation was performed using an alcohol wipe.      GUIDANCE DESCRIPTION:  Electro-myographic guidance was necessary throughout the procedure to accurately identify all areas of spastic muscles while avoiding injection of non-spastic muscles and underlying muscles , neighboring nerves and nearby  vascular structures.     AREA/MUSCLE INJECTED:  Right biceps- 40 U at 2 sites  Right BR- 30 U  Right pronator teres- 30 U  Right FDS- 25 U  Right FCR- 25 U  Right biceps femoris- 50 U at 2 sites    RESPONSE TO PROCEDURE:  Jennifer Cervantes tolerated the procedure well and there were no immediate complications. She was allowed to recover for an appropriate period of time and was discharged home in stable condition.       Jennifer Cervantes will follow up by phone with Dr. Pierce for any questions or concerns that may arise.  Jennifer Cervantes will be scheduled for the next series of injections in 12 weeks.

## 2023-02-02 NOTE — LETTER
2/2/2023         RE: Jennifer Cervantes  [address]  [facility] [address]        Dear Colleague,    Thank you for referring your patient, Jennifer Cervantes, to the [facility]. Please see a copy of my visit note below.    Service Date: 02/02/2023    HISTORY OF PRESENT ILLNESS:  Jennifer is a 22-year-old female who I had previously treated in 2018 with a right elbow flexor pronator release, brachioradialis lengthening, biceps tenotomy, and thenar release.  She has sickle cell disease.  Resultantly, at age 1 or 1 years old she had a stroke and has a right hemiplegia.    She reports she has continued problems with her sickle cell.  She was recently hospitalized.  She has had pneumonia.  Her elbow and hand have gotten worse.  She does get Botox injection and that helped.  She will get of Botox injection later today.    PHYSICAL EXAMINATION:  On examination today, there is loss of passive range of motion since I saw her last.  She lacks 75 degrees from full extension.  She has full elbow flexion.  She lacks supination beyond neutral.  Her wrist sits in 70 degrees of flexion and 90 degrees with the fingers fully extended.    IMPRESSION:  A 22-year-old female with right spastic hemiplegia secondary to stroke with sickle cell disease, status post surgical releases, now with significant recurrent contractures.    PLAN:  Because of her multiple medical problems, she has not been able to be involved in a therapy program.  We would like to have her see our therapist and get enrolled again with a wrist-hand orthosis and passive range of motion.  She has been thinking about whether she needs a bone surgery.  I did discuss with her the possibility of a bone fusion.  She thinks that might be helpful.  I will check her back in 6 months after she re-engages in therapy and will discuss possible further surgical intervention after she has thought over the information we discussed today.    Anai Rivas  MD [facility]        D: 2023   T: 2023   MT: oksana    Name:     HIMANSHU AL  MRN:      [id number]        Account:      [id number]   :      2000           Service Date: 2023       Document: [id number]      Anai Franco MD

## 2023-02-02 NOTE — LETTER
2/2/2023         RE: Jennifer Cervantes  [address]  [facility] [address]        Dear Colleague,    Thank you for referring your patient, Jennifer Cervantes, to the [facility]. Please see a copy of my visit note below.    PM&R Botox Procedure Note    Chief Complaint: Spastic Hemiparesis    /78 (BP Location: Left arm, Patient Position: Sitting, Cuff Size: Adult Large)   Pulse 96   Temp 98.2  F (36.8  C) (Oral)   Resp 16   Wt 70.3 kg (155 lb)   SpO2 97%   BMI 26.59 kg/m         Current Outpatient Medications:      acetaminophen (TYLENOL) 325 MG tablet, Take 2 tablets (650 mg) by mouth every 6 hours as needed for mild pain, Disp: 120 tablet, Rfl: 3     albuterol (PROVENTIL) (2.5 MG/3ML) 0.083% neb solution, Take 2 vials (5 mg) by nebulization every 6 hours as needed for shortness of breath / dyspnea or wheezing, Disp: 90 mL, Rfl: 3     aspirin (ASA) 81 MG chewable tablet, Take 1 tablet (81 mg) by mouth 2 times daily, Disp: 60 tablet, Rfl: 11     budesonide-formoterol (SYMBICORT) 160-4.5 MCG/ACT Inhaler, Inhale 2 puffs into the lungs 2 times daily, Disp: 10.2 g, Rfl: 3     deferasirox (JADENU) 360 MG tablet, Take 4 tablets (1,440 mg) by mouth every evening, Disp: 120 tablet, Rfl: 4     diclofenac (VOLTAREN) 1 % topical gel, Apply 4 g topically 4 times daily, Disp: 350 g, Rfl: 0     FLUoxetine (PROZAC) 10 MG capsule, Take 1 capsule (10 mg) by mouth daily For two weeks, then increase to 20 mg if 10 mg not effective, Disp: 40 capsule, Rfl: 1     folic acid (FOLVITE) 1 MG tablet, Take 1 tablet (1 mg) by mouth daily, Disp: 30 tablet, Rfl: 0     Hydroxyurea 1000 MG TABS, Take 3,000 mg by mouth daily, Disp: 90 tablet, Rfl: 3     ondansetron (ZOFRAN) 8 MG tablet, Take 1 tablet (8 mg) by mouth every 8 hours as needed, Disp: 30 tablet, Rfl: 1     oxyCODONE IR (ROXICODONE) 15 MG tablet, Take 1 tablet (15 mg) by mouth every 4 hours as needed for severe pain (7-10) Goal 4 per day. Max 6 per  day., Disp: 35 tablet, Rfl: 0     traZODone (DESYREL) 50 MG tablet, Take 1 tablet (50 mg) by mouth At Bedtime, Disp: 30 tablet, Rfl: 1     EPINEPHrine (ANY BX GENERIC EQUIV) 0.3 MG/0.3ML injection 2-pack, Inject 0.3 mLs (0.3 mg) into the muscle as needed for anaphylaxis, Disp: 1 each, Rfl: 1    Current Facility-Administered Medications:      botulinum toxin type A (BOTOX) 100 units injection 200 Units, 200 Units, Intramuscular, Q90 Days, Eric Duncan MD, 200 Units at 02/02/23 1134     medroxyPROGESTERone (DEPO-PROVERA) injection 150 mg, 150 mg, Intramuscular, Q90 Days, Suraj Case MD, 150 mg at 12/30/22 1348    Facility-Administered Medications Ordered in Other Visits:      HYDROmorphone (DILAUDID) injection 1 mg, 1 mg, Intravenous, Q1H PRN, Eric Duncan MD, 1 mg at 02/02/23 1218     lactated ringers BOLUS 1,000 mL, 1,000 mL, Intravenous, Once, Eric Duncan MD, Last Rate: 500 mL/hr at 02/02/23 1218, 1,000 mL at 02/02/23 1218     ondansetron (ZOFRAN ODT) ODT tab 8 mg, 8 mg, Oral, Once PRN, Eric Duncan MD        Allergies   Allergen Reactions     Contrast Dye      Hives and breathing issues     Fish-Derived Products Hives     Seafood Hives     Diagnostic X-Ray Materials      Gadolinium         PHYSICAL EXAM  Motor: 4+/5 with right shoulder abduction, 4/5 with right elbow flexion, unable to assess wrist flexion due to contracture. 4/5 with  strength on right. 5/5 left shoulder abduction, elbow extension, wrist extension and  strength/finger intrinsics. 4/5 with right hip flexion, 4/5 with right knee extension, 3/5 with right ankle dorsiflexion, 4+/5 with right EHL, plantar flexion. 5/5 with all muscle groups in left lower extremity.  ROM is 120 degrees with right shoulder abduction, about 130 degrees with right elbow extension.   Tone with MAS- 2/4 with right shoulder abduction, 3/4 with right finger flexors/intrinsics, 2/4 with right knee flexion. Significant  right wrist contracture with minimal extension.  Sensory: intact to light touch throughout all dermatomes in bilateral lower extremities.      HPI  Last set of injections were on 10/14/22.  Patient has been ok since her last visit. Continues with range of motion exercises at home.   RESPONSE TO PREVIOUS TREATMENT:  Patient reports significant noticeable improvement in right upper and lower extremity range of motion since her last visit. About 60%. She states that she has not noticed increased wearing off of the effects of the medication.    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    Her baseline symptoms have been recalcitrant to oral medications and conservative therapy.  She is here today for reinjection with Botox.    GOAL OF PROCEDURE:  The goal of this procedure is to improve increase active range of motion, improve volitional motor control, decrease pain  and enhance functional independence associated with spasticity.    TOTAL DOSE ADMINISTERED:  Dose Administered:  200 units Botox (Botulinum Toxin Type A)       1:1 Dilution     Diluent Used:  Preservative Free Normal Saline  Total Volume of Diluent Used:  2 ml      CONSENT:  The risks, benefits, and treatment options were discussed with Jennifer Cervantes and she agreed to proceed.    EQUIPMENT USED:  Needle-37mm stimulating/recording  EMG/NCS Machine    SKIN PREPARATION:  Skin preparation was performed using an alcohol  wipe.      GUIDANCE DESCRIPTION:  Electro-myographic guidance was necessary throughout the procedure to accurately identify all areas of spastic muscles while avoiding injection of non-spastic muscles and underlying muscles , neighboring nerves and nearby vascular structures.     AREA/MUSCLE INJECTED:  Right biceps- 40 U at 2 sites  Right BR- 30 U  Right pronator teres- 30 U  Right FDS- 25 U  Right FCR- 25 U  Right biceps femoris- 50 U at 2 sites    RESPONSE TO PROCEDURE:  Jennifer Cervantes tolerated the procedure well and there were no immediate complications. She was allowed to recover for an appropriate period of time and was discharged home in stable condition.       Jennifer Cervantes will follow up by phone with Dr. Pierce for any questions or concerns that may arise.  Jennifer Cervantes will be scheduled for the next series of injections in 12 weeks.

## 2023-02-02 NOTE — PROGRESS NOTES
Infusion Nursing Note:  Jennifer Cervantes presents today for add-on infusion   Patient seen by provider today: No   present during visit today: Not Applicable.    Note: Patient received dilaudid x3 and 1 L LR bolus per therapy plan for 10/10 generalized pain with relief. Patient was stable upon discharge.    Intravenous Access:  Implanted Port.    Treatment Conditions:  Results reviewed, labs MET treatment parameters, ok to proceed with treatment.    Post Infusion Assessment:  Patient tolerated infusion without incident.     Discharge Plan:   Patient and/or family verbalized understanding of discharge instructions and all questions answered.      Vicenta Boone RN

## 2023-02-02 NOTE — PATIENT INSTRUCTIONS
You received your next set of botox injections.  You will be scheduled for your next set of injections in 12 weeks.

## 2023-02-06 ENCOUNTER — PATIENT OUTREACH (OUTPATIENT)
Dept: CARE COORDINATION | Facility: CLINIC | Age: 23
End: 2023-02-06
Payer: COMMERCIAL

## 2023-02-06 ENCOUNTER — INFUSION THERAPY VISIT (OUTPATIENT)
Dept: TRANSPLANT | Facility: CLINIC | Age: 23
End: 2023-02-06
Payer: COMMERCIAL

## 2023-02-06 VITALS
TEMPERATURE: 98.4 F | DIASTOLIC BLOOD PRESSURE: 79 MMHG | SYSTOLIC BLOOD PRESSURE: 115 MMHG | RESPIRATION RATE: 16 BRPM | OXYGEN SATURATION: 97 % | HEART RATE: 84 BPM

## 2023-02-06 DIAGNOSIS — G81.10 SPASTIC HEMIPLEGIA, UNSPECIFIED ETIOLOGY, UNSPECIFIED LATERALITY (H): Primary | ICD-10-CM

## 2023-02-06 DIAGNOSIS — D57.00 SICKLE CELL PAIN CRISIS (H): ICD-10-CM

## 2023-02-06 PROCEDURE — 96374 THER/PROPH/DIAG INJ IV PUSH: CPT

## 2023-02-06 PROCEDURE — 96361 HYDRATE IV INFUSION ADD-ON: CPT

## 2023-02-06 PROCEDURE — 258N000003 HC RX IP 258 OP 636: Performed by: PEDIATRICS

## 2023-02-06 PROCEDURE — 96376 TX/PRO/DX INJ SAME DRUG ADON: CPT

## 2023-02-06 PROCEDURE — 250N000011 HC RX IP 250 OP 636: Performed by: PEDIATRICS

## 2023-02-06 PROCEDURE — 250N000013 HC RX MED GY IP 250 OP 250 PS 637: Performed by: PEDIATRICS

## 2023-02-06 RX ORDER — HEPARIN SODIUM (PORCINE) LOCK FLUSH IV SOLN 100 UNIT/ML 100 UNIT/ML
5 SOLUTION INTRAVENOUS
Status: CANCELLED | OUTPATIENT
Start: 2023-07-01

## 2023-02-06 RX ORDER — ONDANSETRON 8 MG/1
8 TABLET, FILM COATED ORAL
Status: CANCELLED
Start: 2023-07-01

## 2023-02-06 RX ORDER — HEPARIN SODIUM,PORCINE 10 UNIT/ML
5 VIAL (ML) INTRAVENOUS
Status: DISCONTINUED | OUTPATIENT
Start: 2023-02-06 | End: 2023-02-06 | Stop reason: HOSPADM

## 2023-02-06 RX ORDER — HEPARIN SODIUM,PORCINE 10 UNIT/ML
5 VIAL (ML) INTRAVENOUS
Status: CANCELLED | OUTPATIENT
Start: 2023-07-01

## 2023-02-06 RX ORDER — DIPHENHYDRAMINE HCL 25 MG
25 CAPSULE ORAL
Status: COMPLETED | OUTPATIENT
Start: 2023-02-06 | End: 2023-02-06

## 2023-02-06 RX ORDER — OXYCODONE HYDROCHLORIDE 15 MG/1
15 TABLET ORAL EVERY 4 HOURS PRN
Qty: 35 TABLET | Refills: 0 | Status: SHIPPED | OUTPATIENT
Start: 2023-02-06 | End: 2023-02-13

## 2023-02-06 RX ORDER — DIPHENHYDRAMINE HCL 25 MG
25 CAPSULE ORAL
Status: CANCELLED
Start: 2023-07-01

## 2023-02-06 RX ORDER — HEPARIN SODIUM (PORCINE) LOCK FLUSH IV SOLN 100 UNIT/ML 100 UNIT/ML
5 SOLUTION INTRAVENOUS
Status: DISCONTINUED | OUTPATIENT
Start: 2023-02-06 | End: 2023-02-06 | Stop reason: HOSPADM

## 2023-02-06 RX ADMIN — HYDROMORPHONE HYDROCHLORIDE 1 MG: 1 INJECTION, SOLUTION INTRAMUSCULAR; INTRAVENOUS; SUBCUTANEOUS at 13:03

## 2023-02-06 RX ADMIN — HYDROMORPHONE HYDROCHLORIDE 1 MG: 1 INJECTION, SOLUTION INTRAMUSCULAR; INTRAVENOUS; SUBCUTANEOUS at 14:04

## 2023-02-06 RX ADMIN — HYDROMORPHONE HYDROCHLORIDE 1 MG: 1 INJECTION, SOLUTION INTRAMUSCULAR; INTRAVENOUS; SUBCUTANEOUS at 15:00

## 2023-02-06 RX ADMIN — SODIUM CHLORIDE, POTASSIUM CHLORIDE, SODIUM LACTATE AND CALCIUM CHLORIDE 1000 ML: 600; 310; 30; 20 INJECTION, SOLUTION INTRAVENOUS at 13:03

## 2023-02-06 RX ADMIN — DIPHENHYDRAMINE HYDROCHLORIDE 25 MG: 25 CAPSULE ORAL at 13:03

## 2023-02-06 ASSESSMENT — PAIN SCALES - GENERAL: PAINLEVEL: EXTREME PAIN (9)

## 2023-02-06 NOTE — PROGRESS NOTES
Infusion Nursing Note:  Jennifer Cervantes presents today for add on infusion of IVF and IV pain meds.    Patient seen by provider today: No   present during visit today: Not Applicable.    Note: No labs/no provider visit today. 1L LR bolus infused over 2 hours. 25mg PO Benadryl given. 1mg IVP Dilaudid given Q1HR x3 doses for a max of 3mg during visit.     Intravenous Access:  Implanted Port.  +BR noted pre and post infusion  De-accessed prior to discharge.    Treatment Conditions:  Met treatment protocol per Triage.    Post Infusion Assessment:  Patient tolerated infusion without incident.     Discharge Plan:   Patient discharged in stable condition accompanied by: self.      Allison Wiggins RN

## 2023-02-06 NOTE — PROGRESS NOTES
Social Work Note: Transportation  Oncology Clinic     Data/Intervention:  Patient Name:  Jennifer Cervantes DOB/Age: 2000, 22 Years Old     Call From: [facility]  Reason for Call: Transportation     Assessment:   called [employer] to arrange ride through patient's insurance. [employer]  arranged  for patient from home with [employer] ([phone]).  Patient will need to call when ready for return ride home ([phone]).      Plan:  Patient is aware of the transportation plan.  available to assist with any other needs.      CARLOS Chavez,SW  Hematology/Oncology Social Worker  Phone:[phone] Pager: [phone]

## 2023-02-06 NOTE — TELEPHONE ENCOUNTER
Oncology Nurse Triage - Sickle Cell Pain Crisis:    Situation: Jennifer  calling about Sickle Cell Pain Crisis    Background:     Patient's last infusion was 2/2/2023  Last clinic visit date:1/30/23 Patricia Mantilla CNP  Next follow-up appt on 3/27/2023 Dr. Duncan  Does patient have active treatment plan?  Yes    Assessment of Symptoms:  Onset/Duration of symptoms: 2 day    Is it typical sickle cell pain? Yes   Location: sidesd  Character: Sharp           Intensity: 9/10    Any radiation of pain, numbness, tingling, weakness, warmth, swelling, discoloration of extremities?No     Fever?No    Chest Pain Present: No     Shortness of breath: No     Other home therapies tried: HEAT/HEATING PAD , warm bath    Last home medication taken and when: Oxycodone 15mg at 6am    Any Refills Needed?: Yes     Does patient have transportation & length of time to get to clinic: No , needs transport      Grades for reference:       Grade 1 - meets protocol  o Patient has active treatment plan.   o Patients last scheduled clinic appointment was attended  o Patient has not been seen in infusion or ED in the last 3 days (clarify exclusions in therapy plans)   o Afebrile   o Typical sickle cell pain   o Home medications have been tried   o No other symptoms     Recommendations:   0917 appt available at 1:00pm with [facility] infusion center.  Scheduling has been able to confirm with Jennifer who accepts appt time.    0979  Abdullahi notified to call patient to assist with transportation.         Please note, if you are late for your appt, you risk losing your infusion appt as it may delay another patient's infusion who arrived on time.

## 2023-02-09 ENCOUNTER — NURSE TRIAGE (OUTPATIENT)
Dept: ONCOLOGY | Facility: CLINIC | Age: 23
End: 2023-02-09

## 2023-02-09 ENCOUNTER — INFUSION THERAPY VISIT (OUTPATIENT)
Dept: INFUSION THERAPY | Facility: CLINIC | Age: 23
End: 2023-02-09
Attending: PEDIATRICS
Payer: COMMERCIAL

## 2023-02-09 ENCOUNTER — PATIENT OUTREACH (OUTPATIENT)
Dept: CARE COORDINATION | Facility: CLINIC | Age: 23
End: 2023-02-09

## 2023-02-09 VITALS
RESPIRATION RATE: 18 BRPM | TEMPERATURE: 98.4 F | HEART RATE: 83 BPM | DIASTOLIC BLOOD PRESSURE: 67 MMHG | SYSTOLIC BLOOD PRESSURE: 132 MMHG | OXYGEN SATURATION: 96 %

## 2023-02-09 DIAGNOSIS — G81.10 SPASTIC HEMIPLEGIA, UNSPECIFIED ETIOLOGY, UNSPECIFIED LATERALITY (H): Primary | ICD-10-CM

## 2023-02-09 DIAGNOSIS — D57.00 SICKLE CELL PAIN CRISIS (H): ICD-10-CM

## 2023-02-09 PROCEDURE — 96361 HYDRATE IV INFUSION ADD-ON: CPT

## 2023-02-09 PROCEDURE — 96376 TX/PRO/DX INJ SAME DRUG ADON: CPT

## 2023-02-09 PROCEDURE — 96374 THER/PROPH/DIAG INJ IV PUSH: CPT

## 2023-02-09 PROCEDURE — 250N000011 HC RX IP 250 OP 636: Performed by: PEDIATRICS

## 2023-02-09 PROCEDURE — 258N000003 HC RX IP 258 OP 636: Performed by: PEDIATRICS

## 2023-02-09 PROCEDURE — 250N000013 HC RX MED GY IP 250 OP 250 PS 637: Performed by: PEDIATRICS

## 2023-02-09 RX ORDER — HEPARIN SODIUM (PORCINE) LOCK FLUSH IV SOLN 100 UNIT/ML 100 UNIT/ML
5 SOLUTION INTRAVENOUS
Status: DISCONTINUED | OUTPATIENT
Start: 2023-02-09 | End: 2023-02-09 | Stop reason: HOSPADM

## 2023-02-09 RX ORDER — DIPHENHYDRAMINE HCL 25 MG
25 CAPSULE ORAL
Status: CANCELLED
Start: 2023-07-01

## 2023-02-09 RX ORDER — HEPARIN SODIUM,PORCINE 10 UNIT/ML
5 VIAL (ML) INTRAVENOUS
Status: CANCELLED | OUTPATIENT
Start: 2023-07-01

## 2023-02-09 RX ORDER — DIPHENHYDRAMINE HCL 25 MG
25 CAPSULE ORAL
Status: COMPLETED | OUTPATIENT
Start: 2023-02-09 | End: 2023-02-09

## 2023-02-09 RX ORDER — ONDANSETRON 8 MG/1
8 TABLET, FILM COATED ORAL
Status: CANCELLED
Start: 2023-07-01

## 2023-02-09 RX ORDER — HEPARIN SODIUM (PORCINE) LOCK FLUSH IV SOLN 100 UNIT/ML 100 UNIT/ML
5 SOLUTION INTRAVENOUS
Status: CANCELLED | OUTPATIENT
Start: 2023-07-01

## 2023-02-09 RX ADMIN — SODIUM CHLORIDE, POTASSIUM CHLORIDE, SODIUM LACTATE AND CALCIUM CHLORIDE 1000 ML: 600; 310; 30; 20 INJECTION, SOLUTION INTRAVENOUS at 10:15

## 2023-02-09 RX ADMIN — DIPHENHYDRAMINE HYDROCHLORIDE 25 MG: 25 CAPSULE ORAL at 10:16

## 2023-02-09 RX ADMIN — HYDROMORPHONE HYDROCHLORIDE 1 MG: 1 INJECTION, SOLUTION INTRAMUSCULAR; INTRAVENOUS; SUBCUTANEOUS at 11:23

## 2023-02-09 RX ADMIN — Medication 5 ML: at 12:43

## 2023-02-09 RX ADMIN — HYDROMORPHONE HYDROCHLORIDE 1 MG: 1 INJECTION, SOLUTION INTRAMUSCULAR; INTRAVENOUS; SUBCUTANEOUS at 10:18

## 2023-02-09 RX ADMIN — HYDROMORPHONE HYDROCHLORIDE 1 MG: 1 INJECTION, SOLUTION INTRAMUSCULAR; INTRAVENOUS; SUBCUTANEOUS at 12:23

## 2023-02-09 ASSESSMENT — PAIN SCALES - GENERAL: PAINLEVEL: WORST PAIN (10)

## 2023-02-09 NOTE — TELEPHONE ENCOUNTER
Pt called in to triage requesting IVF pain meds for back pain and R side pain rated 10/10 x 2 days. Stated last took prn oxycodone at 0600 this morning without relief. Denied any fevers, chills, cough, sob, chest pain, numbness or tingling or other symptoms not typical of sickle cell pain.   Pt's last infusion was 2/6/23, last clinic visit 1/30/23 with follow-up on 3/27/23 with Dr. Duncan.   Patient states needs ride, 25 minutes away from clinic  Pt denied being out of home medications and needing any refills today.   Pt qualifies for sickle cell standing order protocol.  If you do not hear from the infusion center by 2pm then you will not be able to get in for an infusion today.     Added to infusion wait list.    [facility] can offer apt at 10 a.m.  Called Jennifer who confirmed she can come at 10.  Message sent to  to ask for assistance with transportation.  Updated infusion charge nurse.  Message sent to [facility] to schedule.    Routed to Patricia Mantilla CNP and MAURISIO Asencio

## 2023-02-09 NOTE — PROGRESS NOTES
Infusion Nursing Note:  Jennifer Cervantes presents today for IV fluids and pain medications.    Patient seen by provider today: No   present during visit today: Not Applicable.    Note: Pt approved by triage team for IV fluids and pain medications today..    Intravenous Access:  Implanted Port.    Treatment Conditions:  Not Applicable.    Post Infusion Assessment:  Patient tolerated infusion without incident.  Blood return noted pre and post infusion.  Site patent and intact, free from redness, edema or discomfort.  Access discontinued per protocol.     Discharge Plan:   Patient and/or family verbalized understanding of discharge instructions and all questions answered.  Patient discharged in stable condition accompanied by: self.    Administrations This Visit     diphenhydrAMINE (BENADRYL) capsule 25 mg     Admin Date  02/09/2023 Action  Given Dose  25 mg Route  Oral Administered By  Cristela Henry RN          heparin 100 UNIT/ML injection 5 mL     Admin Date  02/09/2023 Action  Given Dose  5 mL Route  Intracatheter Administered By  Cristela Henry RN          HYDROmorphone (DILAUDID) injection 1 mg     Admin Date  02/09/2023 Action  Given Dose  1 mg Route  Intravenous Administered By  Cristela Henry RN           Admin Date  02/09/2023 Action  Given Dose  1 mg Route  Intravenous Administered By  Cristela Henry RN           Admin Date  02/09/2023 Action  Given Dose  1 mg Route  Intravenous Administered By  Cristela Henry RN          lactated ringers BOLUS 1,000 mL     Admin Date  02/09/2023 Action  New Bag Dose  1,000 mL Rate  500 mL/hr Route  Intravenous Administered By  Cristela Henry RN Alyssa Sakhitab-Kerestes, RN

## 2023-02-09 NOTE — PROGRESS NOTES
Social Work Note: Transportation  Oncology Clinic     Data/Intervention:  Patient Name:  Jennifer Cervantes  /Age: 2000, 22 years old     Call From: Patient/[facility] Triage        Reason for Call:  Transportation     Assessment:   called [employer] to arrange ride. [employer] arranged  for patient from home with a will call return ride.  Patient will need to call when ready for return ride home ([phone]).      Plan:  Patient is aware of the transportation plan.  available to assist with any other needs.      CARLOS Chavez,[facility]  Hematology/Oncology Social Worker  Phone:[phone] Pager: [phone]

## 2023-02-09 NOTE — LETTER
2/9/2023         RE: Jennifer Cervantes  [address]  [facility] [address]        Dear Colleague,    Thank you for referring your patient, Jennifer Cervantes, to the [facility]. Please see a copy of my visit note below.    Infusion Nursing Note:  Jennifer Cervantes presents today for IV fluids and pain medications.    Patient seen by provider today: No   present during visit today: Not Applicable.    Note: Pt approved by triage team for IV fluids and pain medications today..    Intravenous Access:  Implanted Port.    Treatment Conditions:  Not Applicable.    Post Infusion Assessment:  Patient tolerated infusion without incident.  Blood return noted pre and post infusion.  Site patent and intact, free from redness, edema or discomfort.  Access discontinued per protocol.     Discharge Plan:   Patient and/or family verbalized understanding of discharge instructions and all questions answered.  Patient discharged in stable condition accompanied by: self.    Administrations This Visit     diphenhydrAMINE (BENADRYL) capsule 25 mg     Admin Date  02/09/2023 Action  Given Dose  25 mg Route  Oral Administered By  Cristela Henry RN          heparin 100 UNIT/ML injection 5 mL     Admin Date  02/09/2023 Action  Given Dose  5 mL Route  Intracatheter Administered By  Cristela Henry RN          HYDROmorphone (DILAUDID) injection 1 mg     Admin Date  02/09/2023 Action  Given Dose  1 mg Route  Intravenous Administered By  Cristela Henry RN           Admin Date  02/09/2023 Action  Given Dose  1 mg Route  Intravenous Administered By  Cristela Henry RN           Admin Date  02/09/2023 Action  Given Dose  1 mg Route  Intravenous Administered By  Cristela Henry RN          lactated ringers BOLUS 1,000 mL     Admin Date  02/09/2023 Action  New Bag Dose  1,000 mL Rate  500 mL/hr Route  Intravenous  Administered By  Carl, JOE Motley, JOE                        Again, thank you for allowing me to participate in the care of your patient.        Sincerely,        Specialty Infusion Nurse

## 2023-02-09 NOTE — PATIENT INSTRUCTIONS
Dear Jennifer Cervantes    Thank you for choosing [facility] ([facility]) for your infusion.  The following information is a summary of our appointment as well as important reminders.      We look forward in seeing you on your next appointment here at [facility] ([facility]).  Please don t hesitate to call us at [phone] to reschedule any of your appointments or to speak with one of the [facility] registered nurses.  It was a pleasure taking care of you today.    Sincerely,    [facility]  [facility]  [address] [place] [address]  [id number]  Phone:  [phone]

## 2023-02-09 NOTE — LETTER
Date:February 10, 2023      Provider requested that no letter be sent. Do not send.       [facility]

## 2023-02-13 ENCOUNTER — INFUSION THERAPY VISIT (OUTPATIENT)
Dept: ONCOLOGY | Facility: CLINIC | Age: 23
End: 2023-02-13
Attending: PEDIATRICS
Payer: COMMERCIAL

## 2023-02-13 ENCOUNTER — NURSE TRIAGE (OUTPATIENT)
Dept: ONCOLOGY | Facility: CLINIC | Age: 23
End: 2023-02-13
Payer: COMMERCIAL

## 2023-02-13 ENCOUNTER — PATIENT OUTREACH (OUTPATIENT)
Dept: CARE COORDINATION | Facility: CLINIC | Age: 23
End: 2023-02-13
Payer: COMMERCIAL

## 2023-02-13 VITALS
TEMPERATURE: 97.9 F | OXYGEN SATURATION: 95 % | RESPIRATION RATE: 18 BRPM | SYSTOLIC BLOOD PRESSURE: 126 MMHG | DIASTOLIC BLOOD PRESSURE: 78 MMHG | HEART RATE: 89 BPM

## 2023-02-13 DIAGNOSIS — D57.00 SICKLE CELL PAIN CRISIS (H): ICD-10-CM

## 2023-02-13 DIAGNOSIS — G81.10 SPASTIC HEMIPLEGIA, UNSPECIFIED ETIOLOGY, UNSPECIFIED LATERALITY (H): Primary | ICD-10-CM

## 2023-02-13 PROCEDURE — 250N000013 HC RX MED GY IP 250 OP 250 PS 637: Performed by: PEDIATRICS

## 2023-02-13 PROCEDURE — 96374 THER/PROPH/DIAG INJ IV PUSH: CPT

## 2023-02-13 PROCEDURE — 96376 TX/PRO/DX INJ SAME DRUG ADON: CPT

## 2023-02-13 PROCEDURE — 258N000003 HC RX IP 258 OP 636: Performed by: PEDIATRICS

## 2023-02-13 PROCEDURE — 250N000011 HC RX IP 250 OP 636: Performed by: PEDIATRICS

## 2023-02-13 PROCEDURE — 96361 HYDRATE IV INFUSION ADD-ON: CPT

## 2023-02-13 RX ORDER — HEPARIN SODIUM (PORCINE) LOCK FLUSH IV SOLN 100 UNIT/ML 100 UNIT/ML
5 SOLUTION INTRAVENOUS
Status: CANCELLED | OUTPATIENT
Start: 2023-07-01

## 2023-02-13 RX ORDER — ONDANSETRON 8 MG/1
8 TABLET, FILM COATED ORAL
Status: CANCELLED
Start: 2023-07-01

## 2023-02-13 RX ORDER — HEPARIN SODIUM,PORCINE 10 UNIT/ML
5 VIAL (ML) INTRAVENOUS
Status: CANCELLED | OUTPATIENT
Start: 2023-07-01

## 2023-02-13 RX ORDER — DIPHENHYDRAMINE HCL 25 MG
25 CAPSULE ORAL
Status: CANCELLED
Start: 2023-07-01

## 2023-02-13 RX ORDER — HEPARIN SODIUM (PORCINE) LOCK FLUSH IV SOLN 100 UNIT/ML 100 UNIT/ML
5 SOLUTION INTRAVENOUS
Status: DISCONTINUED | OUTPATIENT
Start: 2023-02-13 | End: 2023-02-13 | Stop reason: HOSPADM

## 2023-02-13 RX ORDER — DIPHENHYDRAMINE HCL 25 MG
25 CAPSULE ORAL
Status: COMPLETED | OUTPATIENT
Start: 2023-02-13 | End: 2023-02-13

## 2023-02-13 RX ORDER — OXYCODONE HYDROCHLORIDE 15 MG/1
15 TABLET ORAL EVERY 4 HOURS PRN
Qty: 35 TABLET | Refills: 0 | Status: SHIPPED | OUTPATIENT
Start: 2023-02-13 | End: 2023-02-20

## 2023-02-13 RX ADMIN — DIPHENHYDRAMINE HYDROCHLORIDE 25 MG: 25 CAPSULE ORAL at 13:07

## 2023-02-13 RX ADMIN — Medication 5 ML: at 15:26

## 2023-02-13 RX ADMIN — SODIUM CHLORIDE, POTASSIUM CHLORIDE, SODIUM LACTATE AND CALCIUM CHLORIDE 1000 ML: 600; 310; 30; 20 INJECTION, SOLUTION INTRAVENOUS at 13:00

## 2023-02-13 RX ADMIN — HYDROMORPHONE HYDROCHLORIDE 1 MG: 1 INJECTION, SOLUTION INTRAMUSCULAR; INTRAVENOUS; SUBCUTANEOUS at 15:04

## 2023-02-13 RX ADMIN — HYDROMORPHONE HYDROCHLORIDE 1 MG: 1 INJECTION, SOLUTION INTRAMUSCULAR; INTRAVENOUS; SUBCUTANEOUS at 14:08

## 2023-02-13 RX ADMIN — HYDROMORPHONE HYDROCHLORIDE 1 MG: 1 INJECTION, SOLUTION INTRAMUSCULAR; INTRAVENOUS; SUBCUTANEOUS at 13:07

## 2023-02-13 NOTE — TELEPHONE ENCOUNTER
Oncology Nurse Triage - Sickle Cell Pain Crisis:    Situation: Jennifer  calling about Sickle Cell Pain Crisis    Background:     Patient's last infusion was 2/9/23  Last clinic visit date:1/30/23 Patricia Mantilla CNP  Next follow-up appt on 3/27/23 Dr Duncan  Does patient have active treatment plan?  Yes     Assessment of Symptoms:  Onset/Duration of symptoms: 3 day    Is it typical sickle cell pain? Yes   Location: back and sides  Character: Sharp           Intensity: 10/10    Any radiation of pain, numbness, tingling, weakness, warmth, swelling, discoloration of extremities?No     Fever?No      Chest Pain Present: No     Shortness of breath: No     Other home therapies tried: HEAT/HEATING PAD     Last home medication taken and when: Oxycodone 15mg tablets    Any Refills Needed?: Yes , oxycodone 15mg,     Does patient have transportation & length of time to get to clinic: No       Grades for reference:       Grade 1 -   o Patient has active treatment plan.   o Patients last scheduled clinic appointment was attended  o Patient has not been seen in infusion or ED in the last 3 days (clarify exclusions in therapy plans)   o Afebrile   o Typical sickle cell pain   o Home medications have been tried   o No other symptoms     Recommendations:   Meets protocol  0745 appt available for 12:30am IVF/Pain with [facility]  4486 Paged  to assist with transportation  0769 Message sent to scheduling      Please note, if you are late for your appt, you risk losing your infusion appt as it may delay another patient's infusion who arrived on time.

## 2023-02-13 NOTE — PROGRESS NOTES
Social Work Note: Transportation  Oncology Clinic     Data/Intervention:  Patient Name:  Jennifer Cervantes  /Age: 2000, 22 years old     Call From: [facility]        Reason for Call:  Transportation     Assessment:   called [employer] to arrange ride through patient's insurance. [employer] arranged  for patient from home with [employer].  Patient will need to call when ready for return ride home ([phone]).      Plan:  Patient is aware of the transportation plan.  available to assist with any other needs.      CARLOS Chavez,SW  Hematology/Oncology Social Worker  Phone:[phone] Pager: [phone]

## 2023-02-13 NOTE — PROGRESS NOTES
Infusion Nursing Note:  Jennifer Cervantes presents today for IVF + IV pain meds.    Patient seen by provider today: No   present during visit today: Not Applicable.    Note: Jennifer presents to infusion in 10/10 pain, consistent with her usual sickle cell pain. She denies any infectious symptoms, chest pain, or other concerns. At the time of discharge, she rates her pain at 3/10 and states she is comfortable going home.     Intravenous Access:  Implanted Port.    Treatment Conditions:  Not Applicable.    Post Infusion Assessment:  Patient tolerated infusion without incident.  Blood return noted pre and post infusion.  Site patent and intact, free from redness, edema or discomfort.  No evidence of extravasations.  Access discontinued per protocol.     Discharge Plan:   Prescription refills given for oxycodone.  Discharge instructions reviewed with: Patient.  Patient and/or family verbalized understanding of discharge instructions and all questions answered.  AVS to patient via [employer].  Patient will return 3/27 for next appointment.   Patient discharged in stable condition accompanied by: self.  Departure Mode: Ambulatory.      aC Greene RN

## 2023-02-13 NOTE — TELEPHONE ENCOUNTER
Narcotic Refill Request    Medication(s) requested:  Oxycodone   Person Requesting Refill:patient  What pain is the medication treating: sickle pain  How is the medication being taken?:1 tablet every 4 hours  Does pt have enough for today? yes  Is pain being adequately controlled on the current regimen?: okay , requires IVF/pain as needed  Experiencing any side effects from medication?: denies    Date of most recent appointment:  1/30/23 Farhan Mantilla CNP  Any No Show Visits:none recently  Next appointment:   3/27/23 dr. Fernandez  Last fill date and by whom:  Dr. fernandez   Reviewed:       Routed provider: farhan mantilla CNP

## 2023-02-16 ENCOUNTER — NURSE TRIAGE (OUTPATIENT)
Dept: ONCOLOGY | Facility: CLINIC | Age: 23
End: 2023-02-16
Payer: COMMERCIAL

## 2023-02-16 ENCOUNTER — INFUSION THERAPY VISIT (OUTPATIENT)
Dept: ONCOLOGY | Facility: CLINIC | Age: 23
End: 2023-02-16
Attending: PEDIATRICS
Payer: COMMERCIAL

## 2023-02-16 VITALS
RESPIRATION RATE: 16 BRPM | TEMPERATURE: 97.7 F | OXYGEN SATURATION: 96 % | SYSTOLIC BLOOD PRESSURE: 115 MMHG | DIASTOLIC BLOOD PRESSURE: 69 MMHG | HEART RATE: 105 BPM

## 2023-02-16 DIAGNOSIS — D57.00 SICKLE CELL PAIN CRISIS (H): ICD-10-CM

## 2023-02-16 DIAGNOSIS — G81.10 SPASTIC HEMIPLEGIA, UNSPECIFIED ETIOLOGY, UNSPECIFIED LATERALITY (H): Primary | ICD-10-CM

## 2023-02-16 PROCEDURE — 250N000013 HC RX MED GY IP 250 OP 250 PS 637: Performed by: PEDIATRICS

## 2023-02-16 PROCEDURE — 258N000003 HC RX IP 258 OP 636: Performed by: PEDIATRICS

## 2023-02-16 PROCEDURE — 96374 THER/PROPH/DIAG INJ IV PUSH: CPT

## 2023-02-16 PROCEDURE — 96376 TX/PRO/DX INJ SAME DRUG ADON: CPT

## 2023-02-16 PROCEDURE — 96361 HYDRATE IV INFUSION ADD-ON: CPT

## 2023-02-16 PROCEDURE — 250N000011 HC RX IP 250 OP 636: Performed by: PEDIATRICS

## 2023-02-16 RX ORDER — ONDANSETRON 8 MG/1
8 TABLET, FILM COATED ORAL
Status: CANCELLED
Start: 2023-07-01

## 2023-02-16 RX ORDER — HEPARIN SODIUM (PORCINE) LOCK FLUSH IV SOLN 100 UNIT/ML 100 UNIT/ML
5 SOLUTION INTRAVENOUS
Status: CANCELLED | OUTPATIENT
Start: 2023-07-01

## 2023-02-16 RX ORDER — DIPHENHYDRAMINE HCL 25 MG
25 CAPSULE ORAL
Status: CANCELLED
Start: 2023-07-01

## 2023-02-16 RX ORDER — HEPARIN SODIUM (PORCINE) LOCK FLUSH IV SOLN 100 UNIT/ML 100 UNIT/ML
5 SOLUTION INTRAVENOUS
Status: DISCONTINUED | OUTPATIENT
Start: 2023-02-16 | End: 2023-02-16 | Stop reason: HOSPADM

## 2023-02-16 RX ORDER — DIPHENHYDRAMINE HCL 25 MG
25 CAPSULE ORAL
Status: COMPLETED | OUTPATIENT
Start: 2023-02-16 | End: 2023-02-16

## 2023-02-16 RX ORDER — HEPARIN SODIUM,PORCINE 10 UNIT/ML
5 VIAL (ML) INTRAVENOUS
Status: DISCONTINUED | OUTPATIENT
Start: 2023-02-16 | End: 2023-02-16 | Stop reason: HOSPADM

## 2023-02-16 RX ORDER — HEPARIN SODIUM,PORCINE 10 UNIT/ML
5 VIAL (ML) INTRAVENOUS
Status: CANCELLED | OUTPATIENT
Start: 2023-07-01

## 2023-02-16 RX ADMIN — HYDROMORPHONE HYDROCHLORIDE 1 MG: 1 INJECTION, SOLUTION INTRAMUSCULAR; INTRAVENOUS; SUBCUTANEOUS at 10:46

## 2023-02-16 RX ADMIN — HYDROMORPHONE HYDROCHLORIDE 1 MG: 1 INJECTION, SOLUTION INTRAMUSCULAR; INTRAVENOUS; SUBCUTANEOUS at 09:47

## 2023-02-16 RX ADMIN — SODIUM CHLORIDE, POTASSIUM CHLORIDE, SODIUM LACTATE AND CALCIUM CHLORIDE 1000 ML: 600; 310; 30; 20 INJECTION, SOLUTION INTRAVENOUS at 08:45

## 2023-02-16 RX ADMIN — HYDROMORPHONE HYDROCHLORIDE 1 MG: 1 INJECTION, SOLUTION INTRAMUSCULAR; INTRAVENOUS; SUBCUTANEOUS at 08:48

## 2023-02-16 RX ADMIN — DIPHENHYDRAMINE HYDROCHLORIDE 25 MG: 25 CAPSULE ORAL at 08:48

## 2023-02-16 ASSESSMENT — PAIN SCALES - GENERAL: PAINLEVEL: EXTREME PAIN (9)

## 2023-02-16 NOTE — TELEPHONE ENCOUNTER
Oncology Nurse Triage - Sickle Cell Pain Crisis:    Situation: Jennifer  calling about Sickle Cell Pain Crisis    Background:     Patient's last infusion was 2/13/23.  Last clinic visit date:1/30/23 with Patricia Mantilla.  Next follow-up appt on 3/27/23 with Dr. Duncan.  Does patient have active treatment plan?  Yes    (If pt has been seen in ED or infusion in last 3 days or no showed last clinic visit then does not meet protocol unless specified in pt therapy plan)    Assessment of Symptoms:  Onset/Duration of symptoms: 2 day    Is it typical sickle cell pain? Yes   Location: sides  Character: Sharp           Intensity: 8/10    Any radiation of pain, numbness, tingling, weakness, warmth, swelling, discoloration of extremities?No     Fever?No  (if yes max temperature recorded in last 24 hours):      Chest Pain Present: No     Shortness of breath: No     Other home therapies tried: HEAT/HEATING PAD and WARM BATH     Last home medication taken and when: 6AM, oxycodone, and other home meds available, only helped a little.    Any Refills Needed?: No     Does patient have transportation & length of time to get to clinic: No , would need ride.        Recommendations:     Provider approval needed for infusion within last 3 days. Per Patricia Mantilla, pt is ok for infusion today and will be final infusion this week.     [employer] to accomodate patient at 0830 if ride can be coordinated. Message sent to urgent scheduling and SW to arrange. Pt confirmed 0830 works for her as well.    0750, patient was able to get her own ride. SW updated not needed.

## 2023-02-16 NOTE — PROGRESS NOTES
Infusion Nursing Note:  Jennifer Cervantes presents today for IVF and pain medications.    Patient seen by provider today: No   present during visit today: Not Applicable.    Note: Patient arrives with 9/10 pain on bilateral sides that's been on going for two days.  She denies any fevers, chills, shortness of breath, chest pain, nausea, vomiting, constipation, or diarrhea. Patient confirms she has a  today.    Pain level post IVF, three doses of dilaudid, and benadryl was a 3/10. Patient felt comfortable being discharged to home.    Intravenous Access:  Implanted Port.  Patient arrived with home infusion desferal pump going into port. Patient stopped pump.  Pump disconnected during infusion and then re hooked up at end of infusion.     Treatment Conditions:  Not Applicable.    Post Infusion Assessment:  Patient tolerated infusion without incident.  Blood return noted pre and post infusion.  Site patent and intact, free from redness, edema or discomfort.  No evidence of extravasations.     Discharge Plan:   Patient declined prescription refills.  Discharge instructions reviewed with: Patient.  Patient and/or family verbalized understanding of discharge instructions and all questions answered.  AVS to patient via [email or web address].  Patient will return 3/27/23 for next appointment.   Patient discharged in stable condition accompanied by: self.  Departure Mode: Ambulatory.      Dee Dee Rincon RN

## 2023-02-16 NOTE — PATIENT INSTRUCTIONS
[facility] Triage and after hours / weekends / holidays:  [phone]    Please call the triage or after hours line if you experience a temperature greater than or equal to 100.4, shaking chills, have uncontrolled nausea, vomiting and/or diarrhea, dizziness, shortness of breath, chest pain, bleeding, unexplained bruising, or if you have any other new/concerning symptoms, questions or concerns.      If you are having any concerning symptoms or wish to speak to a provider before your next infusion visit, please call your care coordinator or triage to notify them so we can adequately serve you.     If you need a refill on a narcotic prescription or     February 2023 Sunday Monday Tuesday Wednesday Thursday Friday Saturday                  1     2    NEW HAND/WRIST   7:55 AM   (20 min.)   Anai Franco MD   [facility]    NEUROTOXIN  10:45 AM   (60 min.)   Katherine Pierce MD   [facility]    BMT INFUSION 2 HR (120 MIN)  12:00 PM   (120 min.)    BMT INFUSION NURSE   [facility] [place] 3     4       5     6    BMT INFUSION 2 HR (120 MIN)   1:00 PM   (120 min.)    BMT INFUSION NURSE   [facility] [place] 7     8     9    SPEC INFUSION 2 HR (120 MIN)  10:00 AM   (120 min.)    SIPC INFUSION NURSE   [facility] [place] 10     11       12     13    ONC INFUSION 2 HR (120 MIN)  12:30 PM   (120 min.)    ONC INFUSION NURSE   [facility] 14     15     16    ONC INFUSION 2 HR (120 MIN)   8:30 AM   (120 min.)    ONC INFUSION NURSE   [facility] 17     18       19     20     21     22    HAND THERAPY EVAL   7:15 AM   (60 min.)   Farhana Shaffer OT   [facility] 23     24     25       26     27      28 March 2023 Sunday Monday Tuesday Wednesday Thursday Friday Saturday                  1     2     3     4  Happy Birthday!       5     6     7     8     9     10     11       12     13     14     15     16     17     18       19     20    P NURSE VISIT   9:00 AM   (30 min.)   Nurse, [facility] [place] 21     22     23     24     25       26     27    LAB CENTRAL  11:30 AM   (15 min.)   [facility] LAB DRAW   [facility]    RETURN CCSL  11:45 AM   (30 min.)   Eric Duncan MD   [facility] 28     29    UMP RETURN   9:45 AM   (60 min.)   Suraj Case MD   [facility] [place] 30     31                            Lab Results:  No results found for this or any previous visit (from the past 12 hour(s)). other medication, please call before your infusion appointment.

## 2023-02-17 ENCOUNTER — TELEPHONE (OUTPATIENT)
Dept: INTERNAL MEDICINE | Facility: CLINIC | Age: 23
End: 2023-02-17
Payer: COMMERCIAL

## 2023-02-17 NOTE — TELEPHONE ENCOUNTER
[facility] Call Center    Phone Message    May a detailed message be left on voicemail: yes     Reason for Call: Other: Patient states that she has been on birth control, and has been light bleeding for about 30days. She is wanting a medication to stop the bleeding, she says she has had this issue before and was prescribed something to make the bleeding stop. please call the patient back as soon as possible   [facility] [place], [place] - [address] [place] [address] [phone]   Is patients pharmacy   Action Taken: Message routed to:  [facility] pcp    Travel Screening: Not Applicable

## 2023-02-20 ENCOUNTER — PATIENT OUTREACH (OUTPATIENT)
Dept: CARE COORDINATION | Facility: CLINIC | Age: 23
End: 2023-02-20
Payer: COMMERCIAL

## 2023-02-20 ENCOUNTER — NURSE TRIAGE (OUTPATIENT)
Dept: ONCOLOGY | Facility: CLINIC | Age: 23
End: 2023-02-20
Payer: COMMERCIAL

## 2023-02-20 ENCOUNTER — INFUSION THERAPY VISIT (OUTPATIENT)
Dept: TRANSPLANT | Facility: CLINIC | Age: 23
End: 2023-02-20
Attending: PEDIATRICS
Payer: COMMERCIAL

## 2023-02-20 VITALS
SYSTOLIC BLOOD PRESSURE: 122 MMHG | RESPIRATION RATE: 18 BRPM | OXYGEN SATURATION: 90 % | TEMPERATURE: 97.8 F | DIASTOLIC BLOOD PRESSURE: 70 MMHG | HEART RATE: 105 BPM

## 2023-02-20 DIAGNOSIS — D57.00 SICKLE CELL PAIN CRISIS (H): ICD-10-CM

## 2023-02-20 DIAGNOSIS — G81.10 SPASTIC HEMIPLEGIA, UNSPECIFIED ETIOLOGY, UNSPECIFIED LATERALITY (H): Primary | ICD-10-CM

## 2023-02-20 PROCEDURE — 250N000013 HC RX MED GY IP 250 OP 250 PS 637: Performed by: PEDIATRICS

## 2023-02-20 PROCEDURE — 96376 TX/PRO/DX INJ SAME DRUG ADON: CPT

## 2023-02-20 PROCEDURE — 250N000011 HC RX IP 250 OP 636: Performed by: PEDIATRICS

## 2023-02-20 PROCEDURE — 258N000003 HC RX IP 258 OP 636: Performed by: PEDIATRICS

## 2023-02-20 PROCEDURE — 96374 THER/PROPH/DIAG INJ IV PUSH: CPT

## 2023-02-20 PROCEDURE — 96361 HYDRATE IV INFUSION ADD-ON: CPT

## 2023-02-20 RX ORDER — OXYCODONE HYDROCHLORIDE 15 MG/1
15 TABLET ORAL EVERY 4 HOURS PRN
Qty: 35 TABLET | Refills: 0 | Status: SHIPPED | OUTPATIENT
Start: 2023-02-20 | End: 2023-02-27

## 2023-02-20 RX ORDER — HEPARIN SODIUM,PORCINE 10 UNIT/ML
5 VIAL (ML) INTRAVENOUS
Status: CANCELLED | OUTPATIENT
Start: 2023-07-01

## 2023-02-20 RX ORDER — ONDANSETRON 8 MG/1
8 TABLET, FILM COATED ORAL
Status: CANCELLED
Start: 2023-07-01

## 2023-02-20 RX ORDER — HEPARIN SODIUM (PORCINE) LOCK FLUSH IV SOLN 100 UNIT/ML 100 UNIT/ML
5 SOLUTION INTRAVENOUS
Status: CANCELLED | OUTPATIENT
Start: 2023-07-01

## 2023-02-20 RX ORDER — DIPHENHYDRAMINE HCL 25 MG
25 CAPSULE ORAL
Status: COMPLETED | OUTPATIENT
Start: 2023-02-20 | End: 2023-02-20

## 2023-02-20 RX ORDER — DIPHENHYDRAMINE HCL 25 MG
25 CAPSULE ORAL
Status: CANCELLED
Start: 2023-07-01

## 2023-02-20 RX ADMIN — DIPHENHYDRAMINE HYDROCHLORIDE 25 MG: 25 CAPSULE ORAL at 09:57

## 2023-02-20 RX ADMIN — HYDROMORPHONE HYDROCHLORIDE 1 MG: 1 INJECTION, SOLUTION INTRAMUSCULAR; INTRAVENOUS; SUBCUTANEOUS at 12:09

## 2023-02-20 RX ADMIN — HYDROMORPHONE HYDROCHLORIDE 1 MG: 1 INJECTION, SOLUTION INTRAMUSCULAR; INTRAVENOUS; SUBCUTANEOUS at 10:01

## 2023-02-20 RX ADMIN — SODIUM CHLORIDE, POTASSIUM CHLORIDE, SODIUM LACTATE AND CALCIUM CHLORIDE 1000 ML: 600; 310; 30; 20 INJECTION, SOLUTION INTRAVENOUS at 09:57

## 2023-02-20 RX ADMIN — HYDROMORPHONE HYDROCHLORIDE 1 MG: 1 INJECTION, SOLUTION INTRAMUSCULAR; INTRAVENOUS; SUBCUTANEOUS at 10:59

## 2023-02-20 ASSESSMENT — PAIN SCALES - GENERAL: PAINLEVEL: WORST PAIN (10)

## 2023-02-20 NOTE — TELEPHONE ENCOUNTER
BMT can offer apt at 0930  Called Jennifer who confirmed she can come at that time.  Updated infusion nurse that pt can come at 0930.  Message sent to [facility] to ask them to schedule.  Message sent to SW to assist with transportation.    Routed to Patricia Mantilla CNP and Arely Krueger RNCC

## 2023-02-20 NOTE — PROGRESS NOTES
"Infusion Nursing Note:  Jennifer Cervantes presents today for IV Fluids and IV Pain Meds. See MAR.     Patient seen by provider today: No   present during visit today: Not Applicable.     Note: Pt arrived to clinic today rating her Sickle Cell Pain an \"10/10\". States the Pain is mostly in her Low Back. Pt would like her pain down to around 4/10. IV Fluids administered over 2 hours, along with 3 doses of 1mg IV dilaudid (each given one hour apart). Prior to discharge Pt rating her Pain a \"5/10\".      Intravenous Access:  Implanted Port.     Treatment Conditions:  Not Applicable.     Post Infusion Assessment:  Patient tolerated infusion without incident.  Access discontinued per protocol.      Discharge Plan:   Patient discharged in stable condition accompanied by: self.  Departure Mode: Ambulatory.  "

## 2023-02-20 NOTE — TELEPHONE ENCOUNTER
Pt called in to triage requesting IVF pain meds for back  pain rated 10/10 x 3 days. Stated last took prn oxy, and tylenol at 6am this morning without relief. Denied any fevers, chills, cough, sob, chest pain, numbness or tingling or other symptoms not typical of sickle cell pain.     Pt's last infusion was 2/16/23, last clinic visit 1/30/23 Patricia Mantilla  with follow-up on 3/27/23 with Dr Duncan.     Patient states they do not have own ride and it will take 60 minutes long to get to cancer clinic.     Pt denied being out of home medications and needing any refills today.     Pt qualifies for sickle cell standing order protocol.    If you do not hear from the infusion center by 2pm then you will not be able to get in for an infusion today.

## 2023-02-20 NOTE — LETTER
"    2/20/2023         RE: Jennifer Cervantes  [address]  [place] [address]        Dear Colleague,    Thank you for referring your patient, Jennifer Cervantes, to the [facility]PL[facility] [place]. Please see a copy of my visit note below.    Infusion Nursing Note:  Jennifer Cervantes presents today for IV Fluids and IV Pain Meds. See MAR.     Patient seen by provider today: No   present during visit today: Not Applicable.     Note: Pt arrived to clinic today rating her Sickle Cell Pain an \"10/10\". States the Pain is mostly in her Low Back. Pt would like her pain down to around 4/10. IV Fluids administered over 2 hours, along with 3 doses of 1mg IV dilaudid (each given one hour apart). Prior to discharge Pt rating her Pain a \"5/10\".      Intravenous Access:  Implanted Port.     Treatment Conditions:  Not Applicable.     Post Infusion Assessment:  Patient tolerated infusion without incident.  Access discontinued per protocol.      Discharge Plan:   Patient discharged in stable condition accompanied by: self.  Departure Mode: Ambulatory.      Again, thank you for allowing me to participate in the care of your patient.        Sincerely,        BMT Infusion Nurse    "

## 2023-02-20 NOTE — PROGRESS NOTES
Social Work Note: Transportation  Oncology Clinic     Data/Intervention:  Patient Name:  Jennifer Cervantes DOB/Age: 2000     Call From: [facility]        Reason for Call:  Transportation      Assessment:   called [employer] to arrange ride. [employer] arranged  for patient from home with will call ride home.  Patient will need to call when ready for return ride home ([phone]).      Plan:  Patient is aware of the transportation plan.  available to assist with any other needs.      CARLOS Chavez,[facility]  Hematology/Oncology Social Worker  Phone:[phone] Pager: [phone]

## 2023-02-20 NOTE — TELEPHONE ENCOUNTER
Narcotic Refill Request    Medication(s) requested:  Oxycodone   Person Requesting Refill: adelaide   What pain is the medication treating:  Sickle cell pain   How is the medication being taken?:1 tab every 4-6 hours   Does pt have enough for today? Will be out at end of day   Is pain being adequately controlled on the current regimen?:  Yes   Experiencing any side effects from medication?: no     Date of most recent appointment:  1/30/23 Patricia Mantilla     Next appointment:   3/27/23 Dr Duncan   Last fill date and by whom:  2/13/23 Patricia Mantilla    Reviewed:  No access     Routed  provider:  Dr Duncan

## 2023-02-22 ENCOUNTER — NURSE TRIAGE (OUTPATIENT)
Dept: ONCOLOGY | Facility: CLINIC | Age: 23
End: 2023-02-22

## 2023-02-22 NOTE — TELEPHONE ENCOUNTER
Oncology Nurse Triage - Sickle Cell Pain Crisis:    Situation: Jennifer  calling about Sickle Cell Pain Crisis    Background:   Patientt's last infusion was 2/20/23  Last clinic visit date: 1/30/23  Next follow-up appt on 3/27/23  Does patient have active treatment plan?  Yes    Assessment of Symptoms:  Onset/Duration of symptoms: 2 day    Is it typical sickle cell pain? Yes   Location: sides and back  Character: Sharp           Intensity: 8/10    Any radiation of pain, numbness, tingling, weakness, warmth, swelling, discoloration of extremities?No     Fever?No    Chest Pain Present: No     Shortness of breath: No     Other home therapies tried: HEAT/HEATING PAD     Last home medication taken and when: oyxocodone around 1150    Any Refills Needed?: No     Does patient have transportation & length of time to get to clinic: Yes       Grades for reference:       Grade 1 -   o Patient has active treatment plan.   o Patients last scheduled clinic appointment was attended  o Patient has not been seen in infusion or ED in the last 3 days (clarify exclusions in therapy plans)   o Afebrile   o Typically sickle cell pain   o Home medications have been tried   o No other symptoms     Recommendations:   Added to infusion wait list  Paged Dr. Duncan at [phone], as last infusion was on 2/20, pt does not meet protocol, needs approval.     If you do not hear from the infusion center by 2pm then you will not be able to get in for an infusion today. If symptoms worsen while waiting for call back, and/or you experience fever, chills, SOB, chest pain, cough, n/v, dizziness, numbness, swelling, discoloration of extremities, then seek emergency evaluation in Emergency Department.     Please note, if you are late for your appt, you risk losing your infusion appt as it may delay another patient's infusion who arrived on time.

## 2023-02-23 ENCOUNTER — HOSPITAL ENCOUNTER (EMERGENCY)
Facility: CLINIC | Age: 23
Discharge: HOME | End: 2023-02-24
Attending: EMERGENCY MEDICINE | Admitting: EMERGENCY MEDICINE
Payer: COMMERCIAL

## 2023-02-23 DIAGNOSIS — D57.00 SICKLE CELL PAIN CRISIS (H): ICD-10-CM

## 2023-02-23 LAB
ALBUMIN SERPL BCG-MCNC: 4.5 G/DL (ref 3.5–5.2)
ALP SERPL-CCNC: 83 U/L (ref 35–104)
ALT SERPL W P-5'-P-CCNC: 29 U/L (ref 10–35)
ANION GAP SERPL CALCULATED.3IONS-SCNC: 13 MMOL/L (ref 7–15)
AST SERPL W P-5'-P-CCNC: ABNORMAL U/L
BASOPHILS # BLD AUTO: 0.2 10E3/UL (ref 0–0.2)
BASOPHILS NFR BLD AUTO: 1 %
BILIRUB SERPL-MCNC: 2.8 MG/DL
BUN SERPL-MCNC: 5.7 MG/DL (ref 6–20)
CALCIUM SERPL-MCNC: 9.1 MG/DL (ref 8.6–10)
CHLORIDE SERPL-SCNC: 106 MMOL/L (ref 98–107)
CREAT SERPL-MCNC: 0.46 MG/DL (ref 0.51–0.95)
DEPRECATED HCO3 PLAS-SCNC: 19 MMOL/L (ref 22–29)
EOSINOPHIL # BLD AUTO: 0.3 10E3/UL (ref 0–0.7)
EOSINOPHIL NFR BLD AUTO: 2 %
ERYTHROCYTE [DISTWIDTH] IN BLOOD BY AUTOMATED COUNT: ABNORMAL %
GFR SERPL CREATININE-BSD FRML MDRD: >90 ML/MIN/1.73M2
GLUCOSE SERPL-MCNC: 98 MG/DL (ref 70–99)
HCG SERPL QL: NEGATIVE
HCT VFR BLD AUTO: 25.9 % (ref 35–47)
HGB BLD-MCNC: 8.7 G/DL (ref 11.7–15.7)
IMM GRANULOCYTES # BLD: 0.1 10E3/UL
IMM GRANULOCYTES NFR BLD: 1 %
INR PPP: 1.24 (ref 0.85–1.15)
LYMPHOCYTES # BLD AUTO: 2.1 10E3/UL (ref 0.8–5.3)
LYMPHOCYTES NFR BLD AUTO: 17 %
MCH RBC QN AUTO: 30.1 PG (ref 26.5–33)
MCHC RBC AUTO-ENTMCNC: 33.6 G/DL (ref 31.5–36.5)
MCV RBC AUTO: 90 FL (ref 78–100)
MONOCYTES # BLD AUTO: 1 10E3/UL (ref 0–1.3)
MONOCYTES NFR BLD AUTO: 8 %
NEUTROPHILS # BLD AUTO: 9 10E3/UL (ref 1.6–8.3)
NEUTROPHILS NFR BLD AUTO: 71 %
NRBC # BLD AUTO: 0.6 10E3/UL
NRBC BLD AUTO-RTO: 5 /100
PLATELET # BLD AUTO: 445 10E3/UL (ref 150–450)
POTASSIUM SERPL-SCNC: 3.8 MMOL/L (ref 3.4–5.3)
PROT SERPL-MCNC: 7.7 G/DL (ref 6.4–8.3)
RBC # BLD AUTO: 2.89 10E6/UL (ref 3.8–5.2)
RETICS # AUTO: 0.6 10E6/UL (ref 0.03–0.1)
RETICS/RBC NFR AUTO: 20.6 % (ref 0.5–2)
SODIUM SERPL-SCNC: 138 MMOL/L (ref 136–145)
WBC # BLD AUTO: 12.6 10E3/UL (ref 4–11)

## 2023-02-23 PROCEDURE — 84155 ASSAY OF PROTEIN SERUM: CPT | Performed by: EMERGENCY MEDICINE

## 2023-02-23 PROCEDURE — 36415 COLL VENOUS BLD VENIPUNCTURE: CPT | Performed by: EMERGENCY MEDICINE

## 2023-02-23 PROCEDURE — 96375 TX/PRO/DX INJ NEW DRUG ADDON: CPT | Performed by: EMERGENCY MEDICINE

## 2023-02-23 PROCEDURE — 96376 TX/PRO/DX INJ SAME DRUG ADON: CPT | Performed by: EMERGENCY MEDICINE

## 2023-02-23 PROCEDURE — 258N000003 HC RX IP 258 OP 636: Performed by: EMERGENCY MEDICINE

## 2023-02-23 PROCEDURE — 250N000011 HC RX IP 250 OP 636: Performed by: EMERGENCY MEDICINE

## 2023-02-23 PROCEDURE — 85025 COMPLETE CBC W/AUTO DIFF WBC: CPT | Performed by: EMERGENCY MEDICINE

## 2023-02-23 PROCEDURE — 85610 PROTHROMBIN TIME: CPT | Performed by: EMERGENCY MEDICINE

## 2023-02-23 PROCEDURE — 250N000009 HC RX 250: Performed by: EMERGENCY MEDICINE

## 2023-02-23 PROCEDURE — 99285 EMERGENCY DEPT VISIT HI MDM: CPT | Mod: 25 | Performed by: EMERGENCY MEDICINE

## 2023-02-23 PROCEDURE — 96374 THER/PROPH/DIAG INJ IV PUSH: CPT | Performed by: EMERGENCY MEDICINE

## 2023-02-23 PROCEDURE — 99285 EMERGENCY DEPT VISIT HI MDM: CPT | Performed by: EMERGENCY MEDICINE

## 2023-02-23 PROCEDURE — 84703 CHORIONIC GONADOTROPIN ASSAY: CPT | Performed by: EMERGENCY MEDICINE

## 2023-02-23 PROCEDURE — 96361 HYDRATE IV INFUSION ADD-ON: CPT | Performed by: EMERGENCY MEDICINE

## 2023-02-23 PROCEDURE — 85045 AUTOMATED RETICULOCYTE COUNT: CPT | Performed by: EMERGENCY MEDICINE

## 2023-02-23 PROCEDURE — 250N000013 HC RX MED GY IP 250 OP 250 PS 637: Performed by: EMERGENCY MEDICINE

## 2023-02-23 RX ORDER — SODIUM CHLORIDE, SODIUM LACTATE, POTASSIUM CHLORIDE, CALCIUM CHLORIDE 600; 310; 30; 20 MG/100ML; MG/100ML; MG/100ML; MG/100ML
INJECTION, SOLUTION INTRAVENOUS ONCE
Status: COMPLETED | OUTPATIENT
Start: 2023-02-23 | End: 2023-02-24

## 2023-02-23 RX ORDER — KETOROLAC TROMETHAMINE 15 MG/ML
15 INJECTION, SOLUTION INTRAMUSCULAR; INTRAVENOUS ONCE
Status: COMPLETED | OUTPATIENT
Start: 2023-02-23 | End: 2023-02-23

## 2023-02-23 RX ADMIN — Medication 4 MG: at 22:06

## 2023-02-23 RX ADMIN — Medication 4 MG: at 22:49

## 2023-02-23 RX ADMIN — OXYCODONE HYDROCHLORIDE 15 MG: 10 TABLET ORAL at 22:06

## 2023-02-23 RX ADMIN — KETOROLAC TROMETHAMINE 15 MG: 15 INJECTION, SOLUTION INTRAMUSCULAR; INTRAVENOUS at 22:06

## 2023-02-23 RX ADMIN — SODIUM CHLORIDE, POTASSIUM CHLORIDE, SODIUM LACTATE AND CALCIUM CHLORIDE: 600; 310; 30; 20 INJECTION, SOLUTION INTRAVENOUS at 22:13

## 2023-02-23 ASSESSMENT — ACTIVITIES OF DAILY LIVING (ADL): ADLS_ACUITY_SCORE: 35

## 2023-02-24 ENCOUNTER — MYC MEDICAL ADVICE (OUTPATIENT)
Dept: INTERNAL MEDICINE | Facility: CLINIC | Age: 23
End: 2023-02-24
Payer: COMMERCIAL

## 2023-02-24 VITALS
HEART RATE: 72 BPM | DIASTOLIC BLOOD PRESSURE: 79 MMHG | RESPIRATION RATE: 18 BRPM | OXYGEN SATURATION: 96 % | SYSTOLIC BLOOD PRESSURE: 108 MMHG | TEMPERATURE: 98 F

## 2023-02-24 DIAGNOSIS — N93.9 VAGINAL BLEEDING: Primary | ICD-10-CM

## 2023-02-24 PROCEDURE — 250N000011 HC RX IP 250 OP 636: Performed by: EMERGENCY MEDICINE

## 2023-02-24 RX ORDER — HEPARIN SODIUM (PORCINE) LOCK FLUSH IV SOLN 100 UNIT/ML 100 UNIT/ML
5 SOLUTION INTRAVENOUS
Status: DISCONTINUED | OUTPATIENT
Start: 2023-02-24 | End: 2023-02-24 | Stop reason: HOSPADM

## 2023-02-24 RX ORDER — MEDROXYPROGESTERONE ACETATE 2.5 MG/1
2.5 TABLET ORAL DAILY
Qty: 60 TABLET | Refills: 0 | Status: SHIPPED | OUTPATIENT
Start: 2023-02-24 | End: 2023-04-25

## 2023-02-24 RX ADMIN — Medication 5 ML: at 00:33

## 2023-02-24 NOTE — DISCHARGE INSTRUCTIONS
TODAY'S VISIT:  - You should discuss all imaging/radiology tests and laboratory tests that were performed during this visit with your usual providers to ensure you continue to improve and do not need any further evaluation, testing or management.   - Please call your Primary Care team to discuss and arrange a follow-up appointment.   Immediately return to the nearest Emergency Department with any new or worsening symptoms or concerns.    FOLLOW-UP:  Please make an appointment to follow up with:  - Your Primary Care Provider (call them in the morning to discuss your Emergency Department visit and arrange a follow-up appointment).  - If you do not have a primary care provider, you can be seen in follow-up and establish care with one of our providers by calling of the the clinics below:  --- [facility] Center (phone: [phone])  --- Primary Care / [facility] (phone: [phone])   - Have your provider review the results from today's visit with you again to make sure no further follow-up or additional testing is needed based on those results.     PRESCRIPTIONS / MEDICATIONS:  - Discuss ongoing pain medication needs with your usual prescribing providers.     OTHER INSTRUCTIONS:  - Do your best to stay hydrated.    RETURN TO THE EMERGENCY DEPARTMENT  Return to the Emergency Department immediately for any new or worsening symptoms or any concerns.     Remember that you can always come back to the Emergency Department if you are not able to see your regular doctor in the amount of time listed above, if you get any new symptoms, or if there is anything that worries you.

## 2023-02-24 NOTE — TELEPHONE ENCOUNTER
Spoke w/ pt to schedule GYN visit per Dr Case request, scheduled w/ Dr Dee, connected pt w imaging scheduling to coordinate US.

## 2023-02-24 NOTE — ED PROVIDER NOTES
"    [place] EMERGENCY DEPARTMENT ([facility])    2/23/23       ED PROVIDER NOTE        History   No chief complaint on file.    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     For management of her usual sickle cell and additionally infusion center earlier this week, she used her warm showers, home pain medications, but without any notable relief and so presents now for further evaluation and management.     Patient began having symptoms of worsening sickle cell flank pain on Monday, was at the infusion center, somewhat improved but over the course the week has just continued to worsen, which she attributes to the change in weather without any other clear triggers.  Describes her symptoms as being exactly the same as her usual pain flares without any new locations, quality or features or any other alleviating or aggravating features or associated symptoms.  Has pain \"all over\" particularly in the extremities, shoulders, hips, knees, etc., again all consistent w/ prior. No extremity swelling.   No headaches, vision or hearing changes.  No new neuro symptoms, lightheadedness, dizziness, syncope or near syncope.  No numbness, tingling or weakness outside of her usual baseline.  No chest pain, breathing feels normal.  No cough.  No fevers or chills.  No abdominal pain, no change to bowel or bladder, no vaginal bleeding or discharge and denies any chance of pregnancy.  No other MSK or skin changes or symptoms.  No other new symptoms or complaints at this time.  Full ROS completed without any additional " findings.          Past Medical History  Past Medical History:   Diagnosis Date     Anxiety      Bleeding disorder (H)      Blood clotting disorder (H)      Cerebral infarction (H) 2015     Cognitive developmental delay     low IQ. Please recognize when managing pain and planning with her     Depressive disorder      Hemiplegia and hemiparesis following cerebral infarction affecting right dominant side (H)     right hand contractures     Iron overload due to repeated red blood cell transfusions      Migraines      Multiple subsegmental pulmonary emboli without acute cor pulmonale (H) 02/01/2021     Oppositional defiant behavior      Presence of intrauterine contraceptive device 2/18/2020     Superficial venous thrombosis of arm, right 03/25/2021     Uncomplicated asthma      Past Surgical History:   Procedure Laterality Date     AS INSERT TUNNELED CV 2 CATH W/O PORT/PUMP       CHOLECYSTECTOMY       CV RIGHT HEART CATH MEASUREMENTS RECORDED N/A 11/18/2021    Procedure: Right Heart Cath;  Surgeon: Jackson Stauffer MD;  Location:  HEART CARDIAC CATH LAB     INSERT PORT VASCULAR ACCESS Left 4/21/2021    Procedure: INSERTION, VASCULAR ACCESS PORT (NOT SURE ON SIDE UNTIL REMOVAL);  Surgeon: Rajan More MD;  Location: [facility] OR     IR CHEST PORT PLACEMENT > 5 YRS OF AGE  4/21/2021     IR CVC NON TUNNEL LINE REMOVAL  5/6/2021     IR CVC NON TUNNEL PLACEMENT > 5 YRS  04/07/2020     IR CVC NON TUNNEL PLACEMENT > 5 YRS  4/30/2021     IR CVC NON TUNNEL PLACEMENT > 5 YRS  9/7/2022     IR PORT REMOVAL LEFT  4/21/2021     REMOVE PORT VASCULAR ACCESS Left 4/21/2021    Procedure: REMOVAL, VASCULAR ACCESS PORT LEFT;  Surgeon: Rajan More MD;  Location: [facility] OR     REPAIR TENDON ELBOW Right 10/02/2019    Procedure: Right Elbow Flexor Lengthening, Flexor Pronator Slide Of Wrist and Finger, Thumb Adductor Lengthening;  Surgeon: Anai Franco MD;  Location: [facility] OR     TONSILLECTOMY Bilateral 10/02/2019    Procedure:  Bilateral Tonsillectomy;  Surgeon: Farhana Guy MD;  Location:  OR     [facility] [place] BREAST REDUCTION (INCLUDES LIPO) TIER 3 Bilateral 04/23/2019     acetaminophen (TYLENOL) 325 MG tablet  albuterol (PROVENTIL) (2.5 MG/3ML) 0.083% neb solution  aspirin (ASA) 81 MG chewable tablet  budesonide-formoterol (SYMBICORT) 160-4.5 MCG/ACT Inhaler  deferasirox (JADENU) 360 MG tablet  diclofenac (VOLTAREN) 1 % topical gel  EPINEPHrine (ANY BX GENERIC EQUIV) 0.3 MG/0.3ML injection 2-pack  FLUoxetine (PROZAC) 10 MG capsule  folic acid (FOLVITE) 1 MG tablet  Hydroxyurea 1000 MG TABS  naloxone (NARCAN) 4 MG/0.1ML nasal spray  ondansetron (ZOFRAN) 8 MG tablet  oxyCODONE IR (ROXICODONE) 15 MG tablet  traZODone (DESYREL) 50 MG tablet      Allergies   Allergen Reactions     Contrast Dye      Hives and breathing issues     Fish-Derived Products Hives     Seafood Hives     Diagnostic X-Ray Materials      Gadolinium      Family History  Family History   Problem Relation Age of Onset     Sickle Cell Trait Mother      Hypertension Mother      Asthma Mother      Sickle Cell Trait Father      Glaucoma No family hx of      Macular Degeneration No family hx of      Diabetes No family hx of      Gout No family hx of      Social History   Social History     Tobacco Use     Smoking status: Never     Smokeless tobacco: Never   Substance Use Topics     Alcohol use: Not Currently     Alcohol/week: 0.0 standard drinks     Drug use: Never      Past medical history, past surgical history, medications, allergies, family history, and social history were reviewed with the patient. No additional pertinent items.      A complete review of systems was performed with pertinent positives and negatives noted in the HPI, and all other systems negative.    Physical Exam      Physical Exam  CONSTITUTIONAL: Well-developed and well-nourished. Awake and alert. No acute distress. Patient appears generally similar to prior visits, nontoxic.  Has residual  right-sided neuro deficits/abnormalities, unchanged.   HENT:   - Head: Normocephalic and atraumatic.   - Ears: External ear grossly normal.   - Nose: Nose normal. No rhinorrhea. No epistaxis.   - Mouth/Throat: MMM  EYES: Conjunctivae and lids are normal. No scleral icterus.   NECK: Normal range of motion and phonation normal. Neck supple.  No tracheal deviation, no stridor. No edema or erythema noted.  CARDIOVASCULAR: Normal rate, regular rhythm and no appreciable abnormal heart sounds.  PULMONARY/CHEST: Normal work of breathing. No accessory muscle usage or stridor. No respiratory distress.  No appreciable abnormal breath sounds.  ABDOMEN: Soft, non-distended. No tenderness. No peritoneal findings, no rigidity, rebound or guarding.  No palpable masses or abnormal pulsatility appreciated.  MUSCULOSKELETAL: Extremities warm and seemingly well perfused. No edema or calf tenderness. No obvious joint pain or joint swelling, abnormal warmth, or abnormal effusions.  No acute skin findings.  No obvious compartment abnormalities.  Skin intact.  NEUROLOGIC: Awake, alert. Not disoriented. No seizure activity. GCS 15. Baseline right-sided neuro deficits/abnormalities as per usual  SKIN: Skin is warm and dry. No rash noted. No diaphoresis. No pallor.   PSYCHIATRIC: Normal mood and affect. Speech and behavior normal. Thought processes linear. Cognition and memory are normal. No SI/HI reported.    ED Course, Procedures, & Data         Medical Decision Making  The patient's presentation was of moderate complexity (a chronic illness mild to moderate exacerbation, progression, or side effect of treatment).    The patient's evaluation involved:  review of external note(s) from 2 sources (see separate area of note for details)  ordering and/or review of 3+ test(s) in this encounter (see separate area of note for details)  review of 3+ test result(s) ordered prior to this encounter (Reviewed in comparison to today's labs)    The  patient's management necessitated high risk (a parenteral controlled substance). Patient did not want to be admitted after discussion, and would like to be discharged.       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     Clinically, patient appears nontoxic, NAD. Vitals grossly WNL. Otherwise on examination, no outward abnormal findings and patient looks as per usual with her visits with residual right-sided findings from her prior stroke, no acute cardiopulmonary findings, abdominal exam is benign no apparent joint findings for an acute inflammatory or infectious arthritis, no back symptoms, fevers or other infectious symptoms make you think should epidural abscess, hematoma, discitis, bacteremia or other such complication.    Ddx includes, but not limited to, less likely some other type of inflammatory infectious condition (patient describes it as being exactly the same as her previous sickle cell pain flares without any additional symptoms or features or other acute findings on exam, no traumas, think unlikely occult cardiac, etc.  Not have any chest or breathing symptoms to make me think that she would be at risk of acute chest syndrome, PE,  No new sx's for DVT. seizure and no new neuro symptoms for worsening stroke or other pathology.     PLAN:   - Screening labs, symptom management according to ED care plan  - No clear indication for emergent imaging based on current presentation.   - Dispo pending ED course    RESULTS:  Labs:   - WBC 12.6 (down from previous)  - Hgb 8.7 (up from  previous)  - INR near previous  - Tbili 2.8 up from most recent, but not highest in last couple of months  - Reticulocyte count 20.6%, 0.596 absolute reticulocyte count  - Pregnancy negative  Urine: No sample yet provided    INTERVENTIONS:   ED Care Plan meds -   - PO oxycodone  - IV ketamine x2  - IV toradol    RE-EVALUATION:  - The patient's symptoms were improved and she reports she's ready for discharge, which will be arranged. Tolerated PO here w/o issue prior to discharge  - Pt otherwise continues to do well here in the ED, no acute issues or apparent concerning changes in vitals or clinical appearance.    DISCUSSIONS:  - w/ Patient: I have reviewed the available findings, plan, need for close follow up, strict return/safety instructions with the patient. Ensured she knows she's always welcome and to especially return w/ any new/worsening symptoms or any concerns. She expressed understanding and agreement with this plan. All questions answered to the best of our ability at this time.       DISPOSITION/PLANNING:  IMPRESSION:   - Sickle cell pain crisis / flare of prior pain sx's  DISPOSITION:  - Discharge as per patient request  FOLLOW-UP:   - PCP/Hematology  OTHER RECOMMENDATIONS:   - Conservative symptom management, strict return instructions      ______________________________________________________________________            Reta Miramontes MD  [facility] EMERGENCY DEPARTMENT  2/23/2023     Reta Miramontes MD  02/24/23 0512

## 2023-02-24 NOTE — CONFIDENTIAL NOTE
Coordinators please contact patient, and   1. Mention the myChart I sent, and   2. Mention that I sent the hormone prescription to our pharmacy  3. Facilitate:      -- In-person appointment with any PCC provider who does Gyn exams      -- schedule transvaginal ultrasound

## 2023-02-24 NOTE — TELEPHONE ENCOUNTER
I will send the following message and contact can continue in that encounter.    Hello,  I'm writing about your vaginal bleeding, from your phone message. I apologize for the delay ... I am mostly out with COVID. I see you are in the hospital today with a crisis, I'm sorry to hear that.    If the vaginal bleeding is still coming, there are a few options.  A. Do the depo shots every 2 months instead of every 3, or...  B. Continue the depo shots but ADD a daily pill for a few months, or ...  C. See a gynecologist    It looks like previously you added the daily pill ('B' above), so I decided to go ahead with your request.    Either way, we also need to:       1. Do a pelvic exam and pap smear. This is important because you previously had some abnormal cells on your cervix. The exam can be with me or anyone in my clinic.       2. Do a transvaginal ultrasound, to ensure there isn't something bleeding in your uterus, like a fibroid.    I'll ask someone to contact you, for these appointments.    Ryan Case MD  Internal Medicine & Pediatrics  [facility], [facility] [place]

## 2023-02-27 ENCOUNTER — NURSE TRIAGE (OUTPATIENT)
Dept: ONCOLOGY | Facility: CLINIC | Age: 23
End: 2023-02-27
Payer: COMMERCIAL

## 2023-02-27 ENCOUNTER — PATIENT OUTREACH (OUTPATIENT)
Dept: CARE COORDINATION | Facility: CLINIC | Age: 23
End: 2023-02-27
Payer: COMMERCIAL

## 2023-02-27 ENCOUNTER — INFUSION THERAPY VISIT (OUTPATIENT)
Dept: INFUSION THERAPY | Facility: CLINIC | Age: 23
End: 2023-02-27
Attending: PEDIATRICS
Payer: COMMERCIAL

## 2023-02-27 VITALS
DIASTOLIC BLOOD PRESSURE: 73 MMHG | HEART RATE: 105 BPM | RESPIRATION RATE: 16 BRPM | OXYGEN SATURATION: 92 % | SYSTOLIC BLOOD PRESSURE: 112 MMHG | TEMPERATURE: 98.6 F

## 2023-02-27 DIAGNOSIS — G81.10 SPASTIC HEMIPLEGIA, UNSPECIFIED ETIOLOGY, UNSPECIFIED LATERALITY (H): Primary | ICD-10-CM

## 2023-02-27 DIAGNOSIS — D57.00 SICKLE CELL PAIN CRISIS (H): ICD-10-CM

## 2023-02-27 PROCEDURE — 96376 TX/PRO/DX INJ SAME DRUG ADON: CPT

## 2023-02-27 PROCEDURE — 96361 HYDRATE IV INFUSION ADD-ON: CPT

## 2023-02-27 PROCEDURE — 96374 THER/PROPH/DIAG INJ IV PUSH: CPT

## 2023-02-27 PROCEDURE — 250N000013 HC RX MED GY IP 250 OP 250 PS 637: Performed by: PEDIATRICS

## 2023-02-27 PROCEDURE — 250N000011 HC RX IP 250 OP 636: Performed by: PEDIATRICS

## 2023-02-27 PROCEDURE — 258N000003 HC RX IP 258 OP 636: Performed by: PEDIATRICS

## 2023-02-27 RX ORDER — OXYCODONE HYDROCHLORIDE 15 MG/1
15 TABLET ORAL EVERY 4 HOURS PRN
Qty: 35 TABLET | Refills: 0 | Status: SHIPPED | OUTPATIENT
Start: 2023-02-27 | End: 2023-03-06

## 2023-02-27 RX ORDER — ONDANSETRON 8 MG/1
8 TABLET, FILM COATED ORAL
Status: CANCELLED
Start: 2023-07-01

## 2023-02-27 RX ORDER — DIPHENHYDRAMINE HCL 25 MG
25 CAPSULE ORAL
Status: COMPLETED | OUTPATIENT
Start: 2023-02-27 | End: 2023-02-27

## 2023-02-27 RX ORDER — HEPARIN SODIUM,PORCINE 10 UNIT/ML
5 VIAL (ML) INTRAVENOUS
Status: CANCELLED | OUTPATIENT
Start: 2023-07-01

## 2023-02-27 RX ORDER — HEPARIN SODIUM (PORCINE) LOCK FLUSH IV SOLN 100 UNIT/ML 100 UNIT/ML
5 SOLUTION INTRAVENOUS
Status: DISCONTINUED | OUTPATIENT
Start: 2023-02-27 | End: 2023-02-27 | Stop reason: HOSPADM

## 2023-02-27 RX ORDER — DIPHENHYDRAMINE HCL 25 MG
25 CAPSULE ORAL
Status: CANCELLED
Start: 2023-07-01

## 2023-02-27 RX ORDER — HEPARIN SODIUM (PORCINE) LOCK FLUSH IV SOLN 100 UNIT/ML 100 UNIT/ML
5 SOLUTION INTRAVENOUS
Status: CANCELLED | OUTPATIENT
Start: 2023-07-01

## 2023-02-27 RX ADMIN — HYDROMORPHONE HYDROCHLORIDE 1 MG: 1 INJECTION, SOLUTION INTRAMUSCULAR; INTRAVENOUS; SUBCUTANEOUS at 10:28

## 2023-02-27 RX ADMIN — SODIUM CHLORIDE, POTASSIUM CHLORIDE, SODIUM LACTATE AND CALCIUM CHLORIDE 1000 ML: 600; 310; 30; 20 INJECTION, SOLUTION INTRAVENOUS at 10:28

## 2023-02-27 RX ADMIN — DIPHENHYDRAMINE HYDROCHLORIDE 25 MG: 25 CAPSULE ORAL at 10:28

## 2023-02-27 RX ADMIN — HYDROMORPHONE HYDROCHLORIDE 1 MG: 1 INJECTION, SOLUTION INTRAMUSCULAR; INTRAVENOUS; SUBCUTANEOUS at 12:23

## 2023-02-27 RX ADMIN — HYDROMORPHONE HYDROCHLORIDE 1 MG: 1 INJECTION, SOLUTION INTRAMUSCULAR; INTRAVENOUS; SUBCUTANEOUS at 11:25

## 2023-02-27 RX ADMIN — Medication 5 ML: at 12:39

## 2023-02-27 ASSESSMENT — PAIN SCALES - GENERAL: PAINLEVEL: EXTREME PAIN (9)

## 2023-02-27 NOTE — TELEPHONE ENCOUNTER
Narcotic Refill Request    Medication(s) requested:  Oxycodone   Person Requesting Refill: Jennifer (pt   What pain is the medication treating: sickle cell pain  How is the medication being taken?:1 tablet every 4hrs  Does pt have enough for today? Needs today  Is pain being adequately controlled on the current regimen?: okay, requires IVF/pain infusion sometimes  Experiencing any side effects from medication?: denies    Date of most recent appointment:  1/30/23  Any No Show Visits: none recently.   Next appointment:   3/27/23  Last fill date and by whom:  2/20/23 Dr Hernandez on call at the time   Reviewed:     Routed provider: Dr Duncan

## 2023-02-27 NOTE — PATIENT INSTRUCTIONS
Dear Jennifer Cervantes    Thank you for choosing [facility] ([facility]) for your infusion.      We look forward to seeing you at your next appointment here at [facility] ([facility]).  Please don t hesitate to call us at [phone] to reschedule any of your appointments or to speak with one of the [facility] registered nurses.  It was a pleasure taking care of you today.    Sincerely,    [facility]  [address] [place] [address]  [id number]  Phone:  [phone]

## 2023-02-27 NOTE — PROGRESS NOTES
Social Work Note: Transportation  Oncology Clinic     Data/Intervention:  Patient Name:  Jennifer Cervantes  /Age: 2000, 22 Years Old     Call From: [facility]         Reason for Call:  Transportation     Assessment:   called [employer] to arrange ride. [employer] arranged  for patient from home with a will call return ride.  Patient will need to call when ready for return ride home ([phone]).      Plan:  Patient is aware of the transportation plan.  available to assist with any other needs.      CARLOS Chavez,SW  Hematology/Oncology Social Worker  Phone:[phone] Pager: [phone]

## 2023-02-27 NOTE — PROGRESS NOTES
Infusion Nursing Note:  Jennifer Cervantes presents today for   Chief Complaint   Patient presents with     Infusion     IV fluids, analgesia       Patient seen by provider today: No   present during visit today: Not Applicable.    Note:   -Orders from Eric Duncan MD completed. Frequency: as needed.  -1L LR infused over 2hrs.  -25mg Benadryl po.  -1mg Dilaudid given IV push every hour x3. Pain at arrival 9/10, Lt side. Pain at discharge 5/10.    Intravenous Access:  Implanted Port.    Treatment Conditions:  Not Applicable.    Post Infusion Assessment:  Patient tolerated infusion without incident.  Blood return noted pre and post infusion.  Site patent and intact, free from redness, edema or discomfort.  No evidence of extravasations.  Access discontinued per protocol.     Discharge Plan:   Discharge instructions reviewed with: Patient.  Patient and/or family verbalized understanding of discharge instructions and all questions answered.  AVS to patient via [employer].  Patient will contact triage for next appointment.   Patient discharged in stable condition accompanied by: self.  Departure Mode: Ambulatory.      Roz Kaur RN    /79 (BP Location: Left arm, Patient Position: Sitting, Cuff Size: Adult Regular)   Pulse 106   Temp 98.6  F (37  C)   Resp 16   SpO2 92%     Administrations This Visit     diphenhydrAMINE (BENADRYL) capsule 25 mg     Admin Date  02/27/2023 Action  $Given Dose  25 mg Route  Oral Administered By  Roz Kaur RN          heparin 100 UNIT/ML injection 5 mL     Admin Date  02/27/2023 Action  $Given Dose  5 mL Route  Intracatheter Administered By  Ashley Lomax RN          HYDROmorphone (DILAUDID) injection 1 mg     Admin Date  02/27/2023 Action  $Given Dose  1 mg Route  Intravenous Administered By  Roz Kaur RN           Admin Date  02/27/2023 Action  $Given Dose  1 mg Route  Intravenous Administered By  Roz Kaur RN           Admin Date  02/27/2023  Action  $Given Dose  1 mg Route  Intravenous Administered By  Roz Kaur, RN          lactated ringers BOLUS 1,000 mL     Admin Date  02/27/2023 Action  $New Bag Dose  1,000 mL Rate  500 mL/hr Route  Intravenous Administered By  Roz Kaur, RN

## 2023-02-27 NOTE — TELEPHONE ENCOUNTER
Oncology Nurse Triage - Sickle Cell Pain Crisis:    Situation: Jennifer  calling about Sickle Cell Pain Crisis    Background:     Patient's last infusion was 2/20/23  Last clinic visit date:1/30/23  Next follow-up appt on 3/27/23 Dr. Duncan  Does patient have active treatment plan?  Yes    Assessment of Symptoms:  Onset/Duration of symptoms: 2 day    Is it typical sickle cell pain? Yes   Location: left side of abdomen  Character: Sharp           Intensity: 10/10    Any radiation of pain, numbness, tingling, weakness, warmth, swelling, discoloration of extremities?No     Fever?No    Chest Pain Present: No     Shortness of breath: No     Other home therapies tried: HEAT/HEATING PAD     Last home medication taken and when: oxycodone at 6am    Any Refills Needed?: Yes , oxycodone    Does patient have transportation & length of time to get to clinic: No   Needs transport    Grades for reference:       Grade 1 -   o Patient has active treatment plan.   o Patients last scheduled clinic appointment was attended  o Patient has not been seen in infusion or ED in the last 3 days (clarify exclusions in therapy plans)   o Afebrile   o Typical sickle cell pain   o Home medications have been tried   o No other symptoms       Recommendations:   Meets protocol     0829 appt available for 1030am in [facility], pt in agreement with plan    0832 Abdullahi  aware and working on transportation    0833 Scheduling notified    Please note, if you are late for your appt, you risk losing your infusion appt as it may delay another patient's infusion who arrived on time.

## 2023-02-27 NOTE — LETTER
Date:February 27, 2023      Provider requested that no letter be sent. Do not send.       [facility]

## 2023-02-27 NOTE — LETTER
2/27/2023         RE: Jennifer Cervantes  [address]  [place] [address]        Dear Colleague,    Thank you for referring your patient, Jennifer Cervantes, to the [facility]. Please see a copy of my visit note below.    Infusion Nursing Note:  Jennifer Cervantes presents today for   Chief Complaint   Patient presents with     Infusion     IV fluids, analgesia       Patient seen by provider today: No   present during visit today: Not Applicable.    Note:   -Orders from Eric Duncan MD completed. Frequency: as needed.  -1L LR infused over 2hrs.  -25mg Benadryl po.  -1mg Dilaudid given IV push every hour x3. Pain at arrival 9/10, Lt side. Pain at discharge 5/10.    Intravenous Access:  Implanted Port.    Treatment Conditions:  Not Applicable.    Post Infusion Assessment:  Patient tolerated infusion without incident.  Blood return noted pre and post infusion.  Site patent and intact, free from redness, edema or discomfort.  No evidence of extravasations.  Access discontinued per protocol.     Discharge Plan:   Discharge instructions reviewed with: Patient.  Patient and/or family verbalized understanding of discharge instructions and all questions answered.  AVS to patient via [employer].  Patient will contact triage for next appointment.   Patient discharged in stable condition accompanied by: self.  Departure Mode: Ambulatory.      Roz Kaur RN    /79 (BP Location: Left arm, Patient Position: Sitting, Cuff Size: Adult Regular)   Pulse 106   Temp 98.6  F (37  C)   Resp 16   SpO2 92%     Administrations This Visit     diphenhydrAMINE (BENADRYL) capsule 25 mg     Admin Date  02/27/2023 Action  $Given Dose  25 mg Route  Oral Administered By  Roz Kaur RN          heparin 100 UNIT/ML injection 5 mL     Admin Date  02/27/2023 Action  $Given Dose  5 mL Route  Intracatheter Administered By  Ashley Lomax RN          HYDROmorphone (DILAUDID)  injection 1 mg     Admin Date  02/27/2023 Action  $Given Dose  1 mg Route  Intravenous Administered By  Roz Kaur RN           Admin Date  02/27/2023 Action  $Given Dose  1 mg Route  Intravenous Administered By  Roz Kaur RN           Admin Date  02/27/2023 Action  $Given Dose  1 mg Route  Intravenous Administered By  Roz Kaur, JOE          lactated ringers BOLUS 1,000 mL     Admin Date  02/27/2023 Action  $New Bag Dose  1,000 mL Rate  500 mL/hr Route  Intravenous Administered By  Roz Kaur, JOE                                Again, thank you for allowing me to participate in the care of your patient.        Sincerely,        Specialty Infusion Nurse

## 2023-03-02 ENCOUNTER — NURSE TRIAGE (OUTPATIENT)
Dept: ONCOLOGY | Facility: CLINIC | Age: 23
End: 2023-03-02
Payer: COMMERCIAL

## 2023-03-02 ENCOUNTER — INFUSION THERAPY VISIT (OUTPATIENT)
Dept: ONCOLOGY | Facility: CLINIC | Age: 23
End: 2023-03-02
Attending: PEDIATRICS
Payer: COMMERCIAL

## 2023-03-02 ENCOUNTER — PATIENT OUTREACH (OUTPATIENT)
Dept: CARE COORDINATION | Facility: CLINIC | Age: 23
End: 2023-03-02
Payer: COMMERCIAL

## 2023-03-02 VITALS
OXYGEN SATURATION: 92 % | SYSTOLIC BLOOD PRESSURE: 108 MMHG | DIASTOLIC BLOOD PRESSURE: 72 MMHG | RESPIRATION RATE: 16 BRPM | TEMPERATURE: 98.2 F

## 2023-03-02 DIAGNOSIS — D57.00 SICKLE CELL PAIN CRISIS (H): ICD-10-CM

## 2023-03-02 DIAGNOSIS — G81.10 SPASTIC HEMIPLEGIA, UNSPECIFIED ETIOLOGY, UNSPECIFIED LATERALITY (H): Primary | ICD-10-CM

## 2023-03-02 PROCEDURE — 96361 HYDRATE IV INFUSION ADD-ON: CPT

## 2023-03-02 PROCEDURE — 96376 TX/PRO/DX INJ SAME DRUG ADON: CPT

## 2023-03-02 PROCEDURE — 96374 THER/PROPH/DIAG INJ IV PUSH: CPT

## 2023-03-02 PROCEDURE — 258N000003 HC RX IP 258 OP 636: Performed by: PEDIATRICS

## 2023-03-02 PROCEDURE — 250N000011 HC RX IP 250 OP 636: Performed by: PEDIATRICS

## 2023-03-02 PROCEDURE — 250N000013 HC RX MED GY IP 250 OP 250 PS 637: Performed by: PEDIATRICS

## 2023-03-02 RX ORDER — ONDANSETRON 8 MG/1
8 TABLET, FILM COATED ORAL
Status: CANCELLED
Start: 2023-07-01

## 2023-03-02 RX ORDER — HEPARIN SODIUM,PORCINE 10 UNIT/ML
5 VIAL (ML) INTRAVENOUS
Status: DISCONTINUED | OUTPATIENT
Start: 2023-03-02 | End: 2023-03-02 | Stop reason: HOSPADM

## 2023-03-02 RX ORDER — HEPARIN SODIUM (PORCINE) LOCK FLUSH IV SOLN 100 UNIT/ML 100 UNIT/ML
5 SOLUTION INTRAVENOUS
Status: DISCONTINUED | OUTPATIENT
Start: 2023-03-02 | End: 2023-03-02 | Stop reason: HOSPADM

## 2023-03-02 RX ORDER — DIPHENHYDRAMINE HCL 25 MG
25 CAPSULE ORAL
Status: COMPLETED | OUTPATIENT
Start: 2023-03-02 | End: 2023-03-02

## 2023-03-02 RX ORDER — HEPARIN SODIUM,PORCINE 10 UNIT/ML
5 VIAL (ML) INTRAVENOUS
Status: CANCELLED | OUTPATIENT
Start: 2023-07-01

## 2023-03-02 RX ORDER — DIPHENHYDRAMINE HCL 25 MG
25 CAPSULE ORAL
Status: CANCELLED
Start: 2023-07-01

## 2023-03-02 RX ORDER — HEPARIN SODIUM (PORCINE) LOCK FLUSH IV SOLN 100 UNIT/ML 100 UNIT/ML
5 SOLUTION INTRAVENOUS
Status: CANCELLED | OUTPATIENT
Start: 2023-07-01

## 2023-03-02 RX ADMIN — HYDROMORPHONE HYDROCHLORIDE 1 MG: 1 INJECTION, SOLUTION INTRAMUSCULAR; INTRAVENOUS; SUBCUTANEOUS at 14:16

## 2023-03-02 RX ADMIN — DIPHENHYDRAMINE HYDROCHLORIDE 25 MG: 25 CAPSULE ORAL at 13:18

## 2023-03-02 RX ADMIN — HYDROMORPHONE HYDROCHLORIDE 1 MG: 1 INJECTION, SOLUTION INTRAMUSCULAR; INTRAVENOUS; SUBCUTANEOUS at 13:18

## 2023-03-02 RX ADMIN — SODIUM CHLORIDE, POTASSIUM CHLORIDE, SODIUM LACTATE AND CALCIUM CHLORIDE 1000 ML: 600; 310; 30; 20 INJECTION, SOLUTION INTRAVENOUS at 13:18

## 2023-03-02 RX ADMIN — HYDROMORPHONE HYDROCHLORIDE 1 MG: 1 INJECTION, SOLUTION INTRAMUSCULAR; INTRAVENOUS; SUBCUTANEOUS at 15:15

## 2023-03-02 NOTE — TELEPHONE ENCOUNTER
Oncology Nurse Triage - Sickle Cell Pain Crisis:    Situation: Jennifer  calling about Sickle Cell Pain Crisis    Background:   Patient's last infusion was: 2/27/23  Last clinic visit date:1/30/23  Next follow-up appt on 3/10/23  Does patient have active treatment plan?  Yes    Assessment of Symptoms:  Onset/Duration of symptoms: 2 day    Is it typical sickle cell pain? Yes   Location: sides  Character: Sharp           Intensity: 10/10    Any radiation of pain, numbness, tingling, weakness, warmth, swelling, discoloration of extremities?No     Fever?No      Chest Pain Present: No     Shortness of breath: No     Other home therapies tried: HEAT/HEATING PAD     Last home medication taken and when: 0600 Oxycodone    Any Refills Needed?: No     Does patient have transportation & length of time to get to clinic: No needs transportation    Grades for reference:     Grade 1 -   o Patient has active treatment plan.   o Patients last scheduled clinic appointment was attended  o Patient has not been seen in infusion or ED in the last 3 days (clarify exclusions in therapy plans)   o Afebrile   o Typically sickle cell pain   o Home medications have been tried   o No other symptoms     Recommendations:   Added to infusion waitlist.   Per Kelsey Knapp, charge nurse, [facility] can take pt today at 1330.   Call to pt, who will take that slot.   Message sent to scheduling and  for transportation at 0820.     If you do not hear from the infusion center by 2pm then you will not be able to get in for an infusion today. If symptoms worsen while waiting for call back, and/or you experience fever, chills, SOB, chest pain, cough, n/v, dizziness, numbness, swelling, discoloration of extremities, then seek emergency evaluation in Emergency Department.     Please note, if you are late for your appt, you risk losing your infusion appt as it may delay another patient's infusion who arrived on time.

## 2023-03-02 NOTE — PROGRESS NOTES
Social Work Note: Transportation  Oncology Clinic     Data/Intervention:  Patient Name:  Jennifer Cervantes   /Age: 2000, 22 years old     Call From: [facility]        Reason for Call:  Transportation     Assessment:   called [employer] to arrange ride through patient's insurance. [employer] arranged  for patient from home with a will call return ride.  Patient will need to call when ready for return ride home ([phone]).      Plan:  Patient is aware of the transportation plan.  available to assist with any other needs.      CARLOS Chavez,[facility]  Hematology/Oncology Social Worker  Phone:[phone] Pager: [phone]

## 2023-03-02 NOTE — PROGRESS NOTES
Infusion Nursing Note:  eJnnifer Cervantes presents today for add on IVFs and pain meds.    Patient seen by provider today: No    Note: Patient presents to the infusion center today having 10/10 generalized muscle pain. She states it's been bad for about a week now. She denies any other new symptoms or concerns. No fevers, chills, chest pain or shortness of breath.    7/10 after 1st dose.  6/10 after 2nd dose.  6/10 after 3rd dose.    After three doses of IV dilaudid patient states her pain was controlled and she felt comfortable being discharged home. Patient states she is getting a medical cab ride home today.     Intravenous Access:  Implanted Port.    Treatment Conditions:  Not Applicable.    Post Infusion Assessment:  Patient tolerated infusion without incident.  Blood return noted pre and post infusion.  No evidence of extravasations.  Access left intact due to patient receiving continuous home infusion, due to be done at 6pm tonight.    Discharge Plan:   Patient declined prescription refills.  Discharge instructions reviewed with: Patient.  Patient and/or family verbalized understanding of discharge instructions and all questions answered.  AVS to patient via [employer].  Patient will return 3/10 for next appointment.   Patient discharged in stable condition accompanied by: self.  Departure Mode: Ambulatory.      Nichole Vasquez RN

## 2023-03-02 NOTE — PATIENT INSTRUCTIONS
[facility] Triage and after hours / weekends / holidays:  [phone]    Please call the triage or after hours line if you experience a temperature greater than or equal to 100.4, shaking chills, have uncontrolled nausea, vomiting and/or diarrhea, dizziness, shortness of breath, chest pain, bleeding, unexplained bruising, or if you have any other new/concerning symptoms, questions or concerns.      If you are having any concerning symptoms or wish to speak to a provider before your next infusion visit, please call your care coordinator or triage to notify them so we can adequately serve you.     If you need a refill on a narcotic prescription or other medication, please call before your infusion appointment.

## 2023-03-03 ENCOUNTER — THERAPY VISIT (OUTPATIENT)
Dept: OCCUPATIONAL THERAPY | Facility: CLINIC | Age: 23
End: 2023-03-03
Payer: COMMERCIAL

## 2023-03-03 DIAGNOSIS — I69.351 SPASTIC HEMIPLEGIA OF RIGHT DOMINANT SIDE AS LATE EFFECT OF CEREBRAL INFARCTION (H): Primary | ICD-10-CM

## 2023-03-03 DIAGNOSIS — I69.351 HEMIPLEGIA AND HEMIPARESIS FOLLOWING CEREBRAL INFARCTION AFFECTING RIGHT DOMINANT SIDE (H): Primary | ICD-10-CM

## 2023-03-03 PROCEDURE — 97535 SELF CARE MNGMENT TRAINING: CPT | Mod: GO

## 2023-03-03 PROCEDURE — 97166 OT EVAL MOD COMPLEX 45 MIN: CPT | Mod: GO

## 2023-03-03 PROCEDURE — 97760 ORTHOTIC MGMT&TRAING 1ST ENC: CPT | Mod: GO

## 2023-03-03 NOTE — PROGRESS NOTES
SU Hand Therapy Initial Evaluation     Current Date: 3/3/2023    Diagnosis: R hemiplegia s/p releases for improved ROM   DOI: stroke ~age 1  DOS: 10/2019   Procedure: R flexor pronator release, brachioradialis lengthening, biceps tenotomy, and thenar release.   Post: 3.5 years    Subjective:  Answers for HPI/ROS submitted by the patient on 3/3/2023  Reason for Visit:: splints  When problem began:: 2000  How problem occurred:: stroke  Number scale: 2/10  General health as reported by patient: fair  Please check all that apply to your current or past medical history: other  Other Med Hx Detail: sickle cell  Medical allergies: none  Surgeries: other  Other Surgery Detail: tendon release  Medications you are currently taking: none  Occupation:: none      Occupational Profile Information:  Left hand dominant  Prior functional level:  lives with her mother who is her PCA who assists with doing hair, bathing, cooking, share cleaning task, heavier lifting   Patient reports symptoms of pain, stiffness/loss of motion and weakness/loss of strength  Previous treatment: Hand therapy following surgery   Barriers include:transportation  Mobility: No difficulty  Transportation: medical transportation and family  Currently not working, but has an interview next week    Leisure activities/hobbies: reading    Functional Outcome Measure:   Upper Extremity Functional Index Score:  SCORE:   Column Totals: 4/80:  (A lower score indicates greater disability.)    Objective:  Pain Level - Pt w/ no pain at rest; experiences pain with stretching, or someone pulling on the R arm.    Edema  None    Sensation   WNL throughout all nerve distributions; per patient report    ROM  Pain Report: - none  + mild    ++ moderate    +++ severe   Elbow 3/3/2023 3/3/2023   AROM (PROM) L RR   Extension 0 -44 (-38)   Flexion 144 144   Supination 75 -15 (0)   Pronation 90 90     ROM  Pain Report: - none  + mild    ++ moderate    +++ severe   Wrist 3/3/2023  3/3/2023   AROM (PROM) L R   Extension 80 -80 (-58)   Flexion 72 85   RD full negligible   UD full negligible     ROM  Hand 3/3/2023 3/3/2023   AROM(PROM) L R   Index MP / -15/45   PIP / -40/60   DIP / -40/40   ALVARADO     Long MP / -15/58   PIP / -30/75   DIP / -15/40   ALVARADO     Ring MP / -15/48   PIP / -28/75   DIP / -15/58   ALVARADO     Small MP / -5/58   PIP / -20/65   DIP / -5/40    Full through all digits:80, 105, 60 Passively, 0* ext at all digits     ROM  Thumb 3/3/2023 3/3/2023   AROM  (PROM) L R   MP /30 -20/20   IP /58 15/-15     Strength   Resisted Testing:  Pain Report: - none  + mild    ++ moderate    +++ severe   MMT 3/3/2023 3/3/2023   Side: L R   Elbow flexion 5/5 3+/5   Elbow extension 5/5 5/5   Wrist flexion 5/5 2/5     Assessment:  Patient presents with symptoms consistent with diagnosis of right UE hemiplegia s/p surgical release for improved motion.    Patient's limitations or Problem List includes:  Decreased ROM/motion, Weakness, Decreased coordination, Tightness in musculature and spasticity of the right UE which interferes with the patient's ability to perform Self Care Tasks (dressing, bathing, hygiene/toileting), Work Tasks, Recreational Activities, Household Chores and Driving  as compared to previous level of function.    Rehab Potential:  Good - Return to full activity, some limitations    Patient will benefit from skilled Occupational Therapy to increase ROM, flexibility and functional positioning of the R UE to maximize pt comfort and indep in self-cares.     Barriers to Learning:  No barrier    Communication Issues:  Patient appears to be able to clearly communicate and understand verbal and written communication and follow directions correctly.    Chart Review: Chart Review and Simple history review with patient    Identified Performance Deficits: bathing/showering, toileting, dressing, hygiene and grooming, driving and community mobility, home establishment and management, meal preparation  and cleanup, shopping, work and leisure activities    Assessment of Occupational Performance:  5 or more Performance Deficits    Clinical Decision Making (Complexity): Moderate complexity    Treatment Explanation:  The following has been discussed with the patient:  RX ordered/plan of care  Anticipated outcomes  Possible risks and side effects    Plan:  Frequency:  1 X week, once daily  Duration:  for 8 weeks    Treatment Plan:   Modalities:  Paraffin  Therapeutic Exercise:  AAROM, PROM, Place and Hold and Isometrics  Manual Techniques:  Joint mobilization and Myofascial release  Orthotic Fabrication:  Static progressive  Self Care:  Self Care Tasks and Work Tasks    Discharge Plan:  Achieve all LTG.  Independent in home treatment program.  Reach maximal therapeutic benefit.    Home Exercise Program:  Wear resting hand splint during the day and night per comfort for improved R UE positioning   Passive ranging of digits into extension    Next Visit:  PROM HEP for improved flexibility of R UE  Adjust brace as needed  Consider elbow ext splint

## 2023-03-03 NOTE — PROGRESS NOTES
[facility]    OUTPATIENT Occupational Therapy ORTHOPEDIC EVALUATION  PLAN OF TREATMENT FOR OUTPATIENT REHABILITATION  (COMPLETE FOR INITIAL CLAIMS ONLY)  Patient's Last Name, First Name, M.I.  YOB: 2000  Jennifer Cervantes    Provider s Name:  [facility]   Medical Record No.  [id number]   Start of Care Date:  03/03/23   Onset Date:  10/02/19    Treatment Diagnosis:  R hemiplegia s/p R flexor pronator release, brachioradialis lengthening, biceps tenotomy and thenar release Medical Diagnosis:  Data Unavailable       Goals:     03/03/23 0500   Goal #1   Goal #1 self care   Previous Performance Level Requires Assistance   Current Functional Task Position   Current Performance Level 85* wrist flexion at rest, causing difficulty in comfortable positioning during self cares   STG Target Performance Other - on additional line   Other Self Care Improved positioning at rest   STG Target Perform Level 70* or less wrist flexion at rest   Due Date 03/31/23   LTG Target Task/Performance Other - on additional line   Other Self Care Improved positioning at rest - 55* or less wrist flexion at rest   Due Date 04/28/23         Therapy Frequency:  1x/week  Predicted Duration of Therapy Intervention:  8 weeks    ROSANA Cheek Dr.    I CERTIFY THE NEED FOR THESE SERVICES FURNISHED UNDER        THIS PLAN OF TREATMENT AND WHILE UNDER MY CARE     (Physician attestation of this document indicates review and certification of the therapy plan).                     Certification Date From:  03/03/23   Certification Date To:  04/28/23    Referring Provider:  Anai Franco    Initial Assessment        See Epic Evaluation SOC Date: 03/03/23

## 2023-03-05 NOTE — PROGRESS NOTES
"  Assessment & Plan     Cervical cancer screening  Pap smear abnormality of cervix with ASCUS favoring benign  She presents today for Pap and pelvic exam maintains on Depo-Provera every 3 months has recently experienced small amount of spotting.  I reviewed that this can be normal with Depo-Provera and is of no concern.  As she was due for a Pap smear she presented today for pelvic exam which revealed no signs of cervical lesion, no active bleeding and normal vulvovaginal exam.  We will determine next Pap pending results.  I did review with her colposcopy procedure if her Pap is persistently atypical. I reviewed it may take up to one week for PAP results.  - Pap screen only - recommended age 21 - 24 years    Vaginal discharge  Due to previous ASCUS Pap with BV or fungal and recent antibiotics Macrodantin in December we opted to check wet prep and STI check but she is very low risk for STI.  I will inform her of the results.    - Wet prep - Clinic Collect  - NEISSERIA GONORRHOEA PCR  - CHLAMYDIA TRACHOMATIS PCR               BMI:   Estimated body mass index is 26.13 kg/m  as calculated from the following:    Height as of this encounter: 1.626 m (5' 4.02\").    Weight as of this encounter: 69.1 kg (152 lb 4.8 oz).             Michael Dee MD  [facility]   Jennifer is a 23 year old, presenting for the following health issues:  Gyn Exam (Pt here for pelvic exam and pap smear.)      SHEILA Rodriguez I Billy 24 yo follows with Dr Suraj Case for primary care presents for PAP and pelvic. Full preventive visit with Dr Case.   Depo every 3 months without menses but has noted spotting recently describes 1 to 2 days of light blood spotting without cramping.   Previous PAP Ascus in 2020 with BV and fungus. She had urinary tract infection in December treated  Macrodantin.  Has a boyfriend, no sexual relations currently.  She is very careful related to sexual relations to " prevent infections.    Review of chart indicates last PAP 8/12/2020 atypical.  Case Report  Pap                                               Case: [id number]                                   Authorizing Provider:  Daya Carter, Collected:           08/12/2020 1403                                      MD                                                                           Ordering Location:     [facility]  Received:            08/12/2020 1408               First Screen:          Melissa Adler CT (ASCP)                                                       Pathologist:           Esmer Solis MD                                                             Specimen:    Pap Test, Routine, Cervix/Endocervix                                                    Pap Specimen Adequacy  Satisfactory for evaluation, endocervical/transformation zone component present.    Pap Interpretation  Atypical squamous cells of undetermined significance (ASC-US). Abnormal     Electronically signed by Esmer Solis MD on 8/17/2020 at 11:36 AM   Pap Other Findings  Fungal organisms morphologically consistent with Candida spp.   Shift in spencer suggestive of bacterial vaginosis.          Labs reviewed in EPIC  BP Readings from Last 3 Encounters:   03/06/23 116/70   03/06/23 128/82   03/02/23 108/72    Wt Readings from Last 3 Encounters:   03/06/23 69.1 kg (152 lb 4.8 oz)   02/02/23 70.3 kg (155 lb)   01/30/23 68.9 kg (152 lb)                  Patient Active Problem List   Diagnosis     Moderate persistent asthma without complication     Anxiety     Constipation     Hemiplegia and hemiparesis following cerebral infarction affecting right dominant side (H)     Iron overload due to repeated red blood cell transfusions     Hemochromatosis due to repeated red blood cell transfusions     Cognitive developmental delay     Oppositional defiant behavior     Cerebral infarction (H)     Sickle cell pain crisis (H)      Panic disorder     Overweight     Migraine headache     Hypoxemia     Hydrosalpinx     H/O: stroke     Gastritis     Depressive disorder     Chronic back pain     Allergic reaction     Acute pain     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)     History of stroke     Sickle cell crisis (H)     Multiple subsegmental pulmonary emboli without acute cor pulmonale (H)     Atypical squamous cells of undetermined significance on cytologic smear of cervix (ASC-US)     Depo-Provera contraceptive status     Superficial venous thrombosis of arm, right     Acute respiratory failure with hypoxia (H)     Spasticity     Spastic hemiplegia (H)     Acute chest syndrome (H)     Feeling angry     Transient ischemic attack     Uses contraception     Pulmonary embolism, other, unspecified chronicity, unspecified whether acute cor pulmonale present (H)     Chronic pulmonary embolism without acute cor pulmonale, unspecified pulmonary embolism type (H)     Pleuritic chest pain     Subtherapeutic anticoagulation     SOB (shortness of breath)     Hypoxia     Past Surgical History:   Procedure Laterality Date     AS INSERT TUNNELED CV 2 CATH W/O PORT/PUMP       CHOLECYSTECTOMY       CV RIGHT HEART CATH MEASUREMENTS RECORDED N/A 11/18/2021    Procedure: Right Heart Cath;  Surgeon: Jackson Stauffer MD;  Location:  HEART CARDIAC CATH LAB     INSERT PORT VASCULAR ACCESS Left 4/21/2021    Procedure: INSERTION, VASCULAR ACCESS PORT (NOT SURE ON SIDE UNTIL REMOVAL);  Surgeon: Rajan More MD;  Location: [facility] OR     IR CHEST PORT PLACEMENT > 5 YRS OF AGE  4/21/2021     IR CVC NON TUNNEL LINE REMOVAL  5/6/2021     IR CVC NON TUNNEL PLACEMENT > 5 YRS  04/07/2020     IR CVC NON TUNNEL PLACEMENT > 5 YRS  4/30/2021     IR CVC NON TUNNEL PLACEMENT > 5 YRS  9/7/2022     IR PORT REMOVAL LEFT  4/21/2021     REMOVE PORT VASCULAR ACCESS Left 4/21/2021    Procedure: REMOVAL, VASCULAR ACCESS PORT LEFT;  Surgeon: Rajan More MD;  Location: [facility] OR     REPAIR TENDON  ELBOW Right 10/02/2019    Procedure: Right Elbow Flexor Lengthening, Flexor Pronator Slide Of Wrist and Finger, Thumb Adductor Lengthening;  Surgeon: Anai Franco MD;  Location: [facility] OR     TONSILLECTOMY Bilateral 10/02/2019    Procedure: Bilateral Tonsillectomy;  Surgeon: Farhana Guy MD;  Location: [facility] OR     ZZC BREAST REDUCTION (INCLUDES LIPO) TIER 3 Bilateral 04/23/2019       Social History     Tobacco Use     Smoking status: Never     Smokeless tobacco: Never   Substance Use Topics     Alcohol use: Not Currently     Alcohol/week: 0.0 standard drinks     Family History   Problem Relation Age of Onset     Sickle Cell Trait Mother      Hypertension Mother      Asthma Mother      Sickle Cell Trait Father      Glaucoma No family hx of      Macular Degeneration No family hx of      Diabetes No family hx of      Gout No family hx of          Current Outpatient Medications   Medication Sig Dispense Refill     acetaminophen (TYLENOL) 325 MG tablet Take 2 tablets (650 mg) by mouth every 6 hours as needed for mild pain 120 tablet 3     albuterol (PROVENTIL) (2.5 MG/3ML) 0.083% neb solution Take 2 vials (5 mg) by nebulization every 6 hours as needed for shortness of breath / dyspnea or wheezing 90 mL 3     aspirin (ASA) 81 MG chewable tablet Take 1 tablet (81 mg) by mouth 2 times daily 60 tablet 11     budesonide-formoterol (SYMBICORT) 160-4.5 MCG/ACT Inhaler Inhale 2 puffs into the lungs 2 times daily 10.2 g 3     deferasirox (JADENU) 360 MG tablet Take 4 tablets (1,440 mg) by mouth every evening 120 tablet 4     diclofenac (VOLTAREN) 1 % topical gel Apply 4 g topically 4 times daily 350 g 0     EPINEPHrine (ANY BX GENERIC EQUIV) 0.3 MG/0.3ML injection 2-pack Inject 0.3 mLs (0.3 mg) into the muscle as needed for anaphylaxis 1 each 1     FLUoxetine (PROZAC) 10 MG capsule Take 1 capsule (10 mg) by mouth daily For two weeks, then increase to 20 mg if 10 mg not effective 40 capsule 1     folic acid  "(FOLVITE) 1 MG tablet Take 1 tablet (1 mg) by mouth daily 30 tablet 0     Hydroxyurea 1000 MG TABS Take 3,000 mg by mouth daily 90 tablet 3     medroxyPROGESTERone (PROVERA) 2.5 MG tablet Take 1 tablet (2.5 mg) by mouth daily for 60 days 60 tablet 0     naloxone (NARCAN) 4 MG/0.1ML nasal spray Spray 4 mg into one nostril alternating nostrils as needed for opioid reversal every 2-3 minutes until assistance arrives       ondansetron (ZOFRAN) 8 MG tablet Take 1 tablet (8 mg) by mouth every 8 hours as needed 30 tablet 1     oxyCODONE IR (ROXICODONE) 15 MG tablet Take 1 tablet (15 mg) by mouth every 4 hours as needed for severe pain (7-10) Goal 4 per day. Max 6 per day. 35 tablet 0     traZODone (DESYREL) 50 MG tablet Take 1 tablet (50 mg) by mouth At Bedtime 30 tablet 1     Allergies   Allergen Reactions     Contrast Dye      Hives and breathing issues     Fish-Derived Products Hives     Seafood Hives     Diagnostic X-Ray Materials      Gadolinium      Recent Labs   Lab Test 02/23/23  2144 01/30/23  1256 01/18/23  1600 07/12/21  0338 07/10/21  0339 07/08/21  0331   ALT 29 15 18   < > 70* 74*   CR 0.46* 0.52 0.47*   < > 0.50* 0.59   GFRESTIMATED >90 >90 >90   < > >90 >90   GFRESTBLACK  --   --   --   --  >90 >90   POTASSIUM 3.8 4.0 3.4   < > 3.9 3.6    < > = values in this interval not displayed.      Review of Systems   Problem list, PMH, Surgical HX, FH, SH, allergies, medications,immunizations reviewed and updated in Epic.  ROS negative other than noted in HPI and ROS.          Objective    /82 (BP Location: Right arm, Patient Position: Sitting, Cuff Size: Adult Regular)   Pulse 87   Temp 98.2  F (36.8  C)   Resp 20   Ht 1.626 m (5' 4.02\")   Wt 69.1 kg (152 lb 4.8 oz)   LMP 12/01/2019 (Approximate)   SpO2 94%   BMI 26.13 kg/m    Body mass index is 26.13 kg/m .  Physical Exam   General very pleasant female with stable hemiplegia, open communication expresses no concerns about vaginal " infection.  Genitourinary  External genitalia: Normal general appearance, hair distribution shaved, no lesions   Urethral meatus: Normal location no lesions, or prolapse.  Urethra:No tenderness or scarring   Bladder: no fullness, masses, or tenderness   Vagina :Normal general appearance for age, appropriate estrogen effect, no discharge, no lesions, normal pelvic support,  No cystocele, or rectocele  Cervix: general appearance nulliparous, no lesions, or discharge, no signs of bleeding  Uterus :Normal size, contour, position, normal mobility, no tenderness, appropriate support   Adnexa/parametria No masses, tenderness, organomegaly, or nodularity  Anus and perineum: normal to inspection, no rash  Results for orders placed or performed in visit on 03/06/23   Wet prep - Clinic Collect     Status: Abnormal    Specimen: Vagina; Swab   Result Value Ref Range    Trichomonas Absent Absent    Yeast Absent Absent    Clue Cells Absent Absent    WBCs/high power field 2+ (A) None   NEISSERIA GONORRHOEA PCR     Status: Normal    Specimen: Cervix; Swab   Result Value Ref Range    Neisseria gonorrhoeae Negative Negative   CHLAMYDIA TRACHOMATIS PCR     Status: Normal    Specimen: Cervix; Swab   Result Value Ref Range    Chlamydia trachomatis Negative Negative     PAP pending  Michael Dee MD

## 2023-03-06 ENCOUNTER — INFUSION THERAPY VISIT (OUTPATIENT)
Dept: INFUSION THERAPY | Facility: CLINIC | Age: 23
End: 2023-03-06
Attending: PEDIATRICS
Payer: COMMERCIAL

## 2023-03-06 ENCOUNTER — OFFICE VISIT (OUTPATIENT)
Dept: FAMILY MEDICINE | Facility: CLINIC | Age: 23
End: 2023-03-06
Payer: COMMERCIAL

## 2023-03-06 ENCOUNTER — NURSE TRIAGE (OUTPATIENT)
Dept: ONCOLOGY | Facility: CLINIC | Age: 23
End: 2023-03-06

## 2023-03-06 VITALS
TEMPERATURE: 98.2 F | BODY MASS INDEX: 26 KG/M2 | HEIGHT: 64 IN | DIASTOLIC BLOOD PRESSURE: 82 MMHG | RESPIRATION RATE: 20 BRPM | WEIGHT: 152.3 LBS | HEART RATE: 87 BPM | OXYGEN SATURATION: 94 % | SYSTOLIC BLOOD PRESSURE: 128 MMHG

## 2023-03-06 VITALS — DIASTOLIC BLOOD PRESSURE: 70 MMHG | HEART RATE: 91 BPM | SYSTOLIC BLOOD PRESSURE: 116 MMHG

## 2023-03-06 DIAGNOSIS — D57.00 SICKLE CELL PAIN CRISIS (H): ICD-10-CM

## 2023-03-06 DIAGNOSIS — Z12.4 CERVICAL CANCER SCREENING: Primary | ICD-10-CM

## 2023-03-06 DIAGNOSIS — N89.8 VAGINAL DISCHARGE: ICD-10-CM

## 2023-03-06 DIAGNOSIS — R87.610 PAP SMEAR ABNORMALITY OF CERVIX WITH ASCUS FAVORING BENIGN: ICD-10-CM

## 2023-03-06 DIAGNOSIS — G81.10 SPASTIC HEMIPLEGIA, UNSPECIFIED ETIOLOGY, UNSPECIFIED LATERALITY (H): Primary | ICD-10-CM

## 2023-03-06 LAB
CLUE CELLS: ABNORMAL
TRICHOMONAS, WET PREP: ABNORMAL
WBC'S/HIGH POWER FIELD, WET PREP: ABNORMAL
YEAST, WET PREP: ABNORMAL

## 2023-03-06 PROCEDURE — 87210 SMEAR WET MOUNT SALINE/INK: CPT | Performed by: PATHOLOGY

## 2023-03-06 PROCEDURE — G0145 SCR C/V CYTO,THINLAYER,RESCR: HCPCS | Performed by: FAMILY MEDICINE

## 2023-03-06 PROCEDURE — 96376 TX/PRO/DX INJ SAME DRUG ADON: CPT

## 2023-03-06 PROCEDURE — 250N000013 HC RX MED GY IP 250 OP 250 PS 637: Performed by: PEDIATRICS

## 2023-03-06 PROCEDURE — 96374 THER/PROPH/DIAG INJ IV PUSH: CPT

## 2023-03-06 PROCEDURE — 99213 OFFICE O/P EST LOW 20 MIN: CPT | Performed by: FAMILY MEDICINE

## 2023-03-06 PROCEDURE — 250N000011 HC RX IP 250 OP 636: Performed by: PEDIATRICS

## 2023-03-06 PROCEDURE — 87591 N.GONORRHOEAE DNA AMP PROB: CPT | Mod: 90 | Performed by: PATHOLOGY

## 2023-03-06 PROCEDURE — 99000 SPECIMEN HANDLING OFFICE-LAB: CPT | Performed by: PATHOLOGY

## 2023-03-06 PROCEDURE — 96361 HYDRATE IV INFUSION ADD-ON: CPT

## 2023-03-06 PROCEDURE — 87491 CHLMYD TRACH DNA AMP PROBE: CPT | Mod: 90 | Performed by: PATHOLOGY

## 2023-03-06 PROCEDURE — 258N000003 HC RX IP 258 OP 636: Performed by: PEDIATRICS

## 2023-03-06 RX ORDER — HEPARIN SODIUM (PORCINE) LOCK FLUSH IV SOLN 100 UNIT/ML 100 UNIT/ML
5 SOLUTION INTRAVENOUS
Status: DISCONTINUED | OUTPATIENT
Start: 2023-03-06 | End: 2023-03-07 | Stop reason: HOSPADM

## 2023-03-06 RX ORDER — HEPARIN SODIUM,PORCINE 10 UNIT/ML
5 VIAL (ML) INTRAVENOUS
Status: DISCONTINUED | OUTPATIENT
Start: 2023-03-06 | End: 2023-03-07 | Stop reason: HOSPADM

## 2023-03-06 RX ORDER — HEPARIN SODIUM (PORCINE) LOCK FLUSH IV SOLN 100 UNIT/ML 100 UNIT/ML
5 SOLUTION INTRAVENOUS
Status: CANCELLED | OUTPATIENT
Start: 2023-07-01

## 2023-03-06 RX ORDER — DIPHENHYDRAMINE HCL 25 MG
25 CAPSULE ORAL
Status: CANCELLED
Start: 2023-07-01

## 2023-03-06 RX ORDER — HEPARIN SODIUM,PORCINE 10 UNIT/ML
5 VIAL (ML) INTRAVENOUS
Status: CANCELLED | OUTPATIENT
Start: 2023-07-01

## 2023-03-06 RX ORDER — OXYCODONE HYDROCHLORIDE 15 MG/1
15 TABLET ORAL EVERY 4 HOURS PRN
Qty: 35 TABLET | Refills: 0 | Status: SHIPPED | OUTPATIENT
Start: 2023-03-06 | End: 2023-03-13

## 2023-03-06 RX ORDER — DIPHENHYDRAMINE HCL 25 MG
25 CAPSULE ORAL
Status: COMPLETED | OUTPATIENT
Start: 2023-03-06 | End: 2023-03-06

## 2023-03-06 RX ORDER — ONDANSETRON 8 MG/1
8 TABLET, FILM COATED ORAL
Status: CANCELLED
Start: 2023-07-01

## 2023-03-06 RX ADMIN — Medication 5 ML: at 14:59

## 2023-03-06 RX ADMIN — HYDROMORPHONE HYDROCHLORIDE 1 MG: 1 INJECTION, SOLUTION INTRAMUSCULAR; INTRAVENOUS; SUBCUTANEOUS at 12:39

## 2023-03-06 RX ADMIN — SODIUM CHLORIDE, POTASSIUM CHLORIDE, SODIUM LACTATE AND CALCIUM CHLORIDE 1000 ML: 600; 310; 30; 20 INJECTION, SOLUTION INTRAVENOUS at 12:39

## 2023-03-06 RX ADMIN — DIPHENHYDRAMINE HYDROCHLORIDE 25 MG: 25 CAPSULE ORAL at 12:30

## 2023-03-06 RX ADMIN — HYDROMORPHONE HYDROCHLORIDE 1 MG: 1 INJECTION, SOLUTION INTRAMUSCULAR; INTRAVENOUS; SUBCUTANEOUS at 13:41

## 2023-03-06 RX ADMIN — HYDROMORPHONE HYDROCHLORIDE 1 MG: 1 INJECTION, SOLUTION INTRAMUSCULAR; INTRAVENOUS; SUBCUTANEOUS at 14:35

## 2023-03-06 ASSESSMENT — ANXIETY QUESTIONNAIRES
7. FEELING AFRAID AS IF SOMETHING AWFUL MIGHT HAPPEN: NOT AT ALL
5. BEING SO RESTLESS THAT IT IS HARD TO SIT STILL: MORE THAN HALF THE DAYS
6. BECOMING EASILY ANNOYED OR IRRITABLE: NEARLY EVERY DAY
3. WORRYING TOO MUCH ABOUT DIFFERENT THINGS: MORE THAN HALF THE DAYS
2. NOT BEING ABLE TO STOP OR CONTROL WORRYING: NOT AT ALL
GAD7 TOTAL SCORE: 10
1. FEELING NERVOUS, ANXIOUS, OR ON EDGE: SEVERAL DAYS
GAD7 TOTAL SCORE: 10

## 2023-03-06 ASSESSMENT — PATIENT HEALTH QUESTIONNAIRE - PHQ9
SUM OF ALL RESPONSES TO PHQ QUESTIONS 1-9: 9
5. POOR APPETITE OR OVEREATING: MORE THAN HALF THE DAYS

## 2023-03-06 ASSESSMENT — ASTHMA QUESTIONNAIRES: ACT_TOTALSCORE: 12

## 2023-03-06 NOTE — TELEPHONE ENCOUNTER
Contacted pt to inform them time slot available today at 1300. Pt stated she is already in the building picking up prescriptions. Pt in agreement to stay for 1300 appt. Communicated with schedulers to have pt scheduled.

## 2023-03-06 NOTE — LETTER
Date:March 8, 2023      Provider requested that no letter be sent. Do not send.       [facility]

## 2023-03-06 NOTE — TELEPHONE ENCOUNTER
11:44 appt available now for pt to check in for IVF/pain infusion. This writer called Jennifer made two attempts to inform pt to check in early for appt.   Pt is finishing up 11:00am appt and will check in early for IVF/Pain infusion as soon as done.

## 2023-03-06 NOTE — TELEPHONE ENCOUNTER
Pt called in to triage requesting IVF pain meds for all over, bilateral but L> R pain rated 10/10 x two weeks days. Stated last took prn oxycodone 0600 this morning without relief. Denied any fevers, chills, cough, sob, chest pain, numbness or tingling, swelling, or other symptoms not typical of sickle cell pain. Doesn't hurt worse with deep breath, typical sickle cell pain  Pt's last infusion was 3/2/23, last clinic visit 1/30/23 with follow-up on 3/10/23 with Patricia Mantilla CNP.   Patient states has ride to apt.  If pt is arranging their own transportation, do they have a ride to and a ride home from their appointment? Yes  Pt needs oxy refill, taking q4h, 6 max but takes all 6, no se, helps--Pended in refill encounter  Pt qualifies for sickle cell standing order protocol.  If you do not hear from the infusion center by 2pm then you will not be able to get in for an infusion today.     Added to infusion wait list.

## 2023-03-06 NOTE — LETTER
3/6/2023         RE: Jennifer Cervantes  [address]  [facility] [address]        Dear Colleague,    Thank you for referring your patient, Jennifer Cervantes, to the [facility]. Please see a copy of my visit note below.    Nursing Note  Jennifer Cervantes [facility] for:   Chief Complaint   Patient presents with     Infusion     IV fluids, pain meds, benadryl     During today's [facility] appointment, orders from Dr. Duncan were completed.  Frequency: as needed    Progress note:  Patient identification verified by name and date of birth.  Assessment completed.  Vitals recorded in Doc Flowsheets.  Patient was provided with education regarding medication/procedure and possible side effects.  Patient verbalized understanding.     present during visit today: Not Applicable.    Treatment Conditions: Non-applicable.  Premedications: administered per order.  Drug Waste Record: No  Infusion length and rate:  500 ml/hr., over 2 hours  Labs: were not ordered for this appointment.  Vascular access: port accessed today.  Is the next appt scheduled? No, patient needs to call in and go through triage    Post Infusion Assessment:  Patient tolerated infusion without incident.  Blood return noted pre and post infusion.  Site patent and intact, free from redness, edema or discomfort.  No evidence of extravasations.  Access discontinued per protocol.     Discharge Plan:   Follow up plan of care with: ordering provider as scheduled.  Discharge instructions were reviewed with patient.  Patient/representative verbalized understanding of discharge instructions and all questions answered.  Patient discharged [facility] in stable condition.    Roz Arellano RN    Administrations This Visit     diphenhydrAMINE (BENADRYL) capsule 25 mg     Admin Date  03/06/2023 Action  $Given  Dose  25 mg Route  Oral Administered By  Roz Arellano RN          heparin 100 UNIT/ML injection 5 mL     Admin Date  03/06/2023 Action  $Given Dose  5 mL Route  Intracatheter Administered By  Yina Ford, JOE          HYDROmorphone (DILAUDID) injection 1 mg     Admin Date  03/06/2023 Action  $Given Dose  1 mg Route  Intravenous Administered By  Roz Arellano RN           Admin Date  03/06/2023 Action  $Given Dose  1 mg Route  Intravenous Administered By  Roz Arellano RN           Admin Date  03/06/2023 Action  $Given Dose  1 mg Route  Intravenous Administered By  Roz Arellano RN          lactated ringers BOLUS 1,000 mL     Admin Date  03/06/2023 Action  $New Bag Dose  1,000 mL Rate  500 mL/hr Route  Intravenous Administered By  Roz Arellano RN                /70   Pulse 91   LMP 12/01/2019 (Approximate)         Again, thank you for allowing me to participate in the care of your patient.        Sincerely,        Specialty Infusion Nurse

## 2023-03-06 NOTE — TELEPHONE ENCOUNTER
Narcotic Refill Request    Medication(s) requested:  Oxycodone IR 15 MG tablet  Person Requesting Refill:Jennifer  What pain is the medication treating: Sickle Cell pain  How is the medication being taken?: q4h, max 6 tabs per day. Pt states she is taking 6 tabs per day.   Does pt have enough for today? unsure  Is pain being adequately controlled on the current regimen?: yes  Experiencing any side effects from medication?: no    Date of most recent appointment:  1/20/23 with Patricia Mantilla CNP  Any No Show Visits:No  Next appointment:   3/10/23 with Patricia Mantilla CNP  Last fill date and by whom:  2/27/23 by Patricia Mantilla CNP   Reviewed: No     Routed/Paged provider: Pended and Routed to Patricia Mantilla CNP

## 2023-03-06 NOTE — PATIENT INSTRUCTIONS
Dear Jennifer Cervantes    Thank you for choosing [facility] ([facility]) for your infusion.  The following information is a summary of our appointment as well as important reminders.      We look forward in seeing you on your next appointment here at [facility] ([facility]).  Please don t hesitate to call us at [phone] to reschedule any of your appointments or to speak with one of the [facility] registered nurses.  It was a pleasure taking care of you today.    Sincerely,    [facility]  [facility]  [address] [place] [address]  [id number]  Phone:  [phone]

## 2023-03-06 NOTE — Clinical Note
FYI Still awaiting PAP results but no infection and pelvic check very normal. Best wishes, Michael Dee MD

## 2023-03-06 NOTE — TELEPHONE ENCOUNTER
Jennifer called to say she rescheduled her apt and will be available any time for an infusion apt.  Informed Jennifer that we do not have any openings currently but we will let her know if one becomes available.

## 2023-03-07 LAB
C TRACH DNA SPEC QL NAA+PROBE: NEGATIVE
N GONORRHOEA DNA SPEC QL NAA+PROBE: NEGATIVE

## 2023-03-07 NOTE — PROGRESS NOTES
Nursing Note  Jennifer Cervantes presents today [facility] for:   Chief Complaint   Patient presents with     Infusion     IV fluids, pain meds, benadryl     During today's [facility] appointment, orders from Dr. Duncan were completed.  Frequency: as needed    Progress note:  Patient identification verified by name and date of birth.  Assessment completed.  Vitals recorded in Doc Flowsheets.  Patient was provided with education regarding medication/procedure and possible side effects.  Patient verbalized understanding.     present during visit today: Not Applicable.    Treatment Conditions: Non-applicable.  Premedications: administered per order.  Drug Waste Record: No  Infusion length and rate:  500 ml/hr., over 2 hours  Labs: were not ordered for this appointment.  Vascular access: port accessed today.  Is the next appt scheduled? No, patient needs to call in and go through triage    Post Infusion Assessment:  Patient tolerated infusion without incident.  Blood return noted pre and post infusion.  Site patent and intact, free from redness, edema or discomfort.  No evidence of extravasations.  Access discontinued per protocol.     Discharge Plan:   Follow up plan of care with: ordering provider as scheduled.  Discharge instructions were reviewed with patient.  Patient/representative verbalized understanding of discharge instructions and all questions answered.  Patient discharged [facility] in stable condition.    Roz Arellano, JOE    Administrations This Visit     diphenhydrAMINE (BENADRYL) capsule 25 mg     Admin Date  03/06/2023 Action  $Given Dose  25 mg Route  Oral Administered By  Roz Arellano RN          heparin 100 UNIT/ML injection 5 mL     Admin Date  03/06/2023 Action  $Given Dose  5 mL Route  Intracatheter Administered By  Yina Ford RN          HYDROmorphone (DILAUDID) injection 1 mg      Admin Date  03/06/2023 Action  $Given Dose  1 mg Route  Intravenous Administered By  Roz Arellano RN           Admin Date  03/06/2023 Action  $Given Dose  1 mg Route  Intravenous Administered By  Roz Arellano RN           Admin Date  03/06/2023 Action  $Given Dose  1 mg Route  Intravenous Administered By  Roz Arellano RN          lactated ringers BOLUS 1,000 mL     Admin Date  03/06/2023 Action  $New Bag Dose  1,000 mL Rate  500 mL/hr Route  Intravenous Administered By  Roz Arellano RN                /70   Pulse 91   LMP 12/01/2019 (Approximate)

## 2023-03-07 NOTE — RESULT ENCOUNTER NOTE
Dear Jennifer NEWMAN Billy    Your infection check was negative, good results.  Your PAP is not back yet.  Best wishes,  Michael Dee MD

## 2023-03-08 LAB
BKR LAB AP GYN ADEQUACY: NORMAL
BKR LAB AP GYN INTERPRETATION: NORMAL
BKR LAB AP HPV REFLEX: NO
BKR LAB AP LMP: NORMAL
BKR LAB AP PREVIOUS ABNL DX: NORMAL
BKR LAB AP PREVIOUS ABNORMAL: NORMAL
PATH REPORT.COMMENTS IMP SPEC: NORMAL
PATH REPORT.COMMENTS IMP SPEC: NORMAL
PATH REPORT.RELEVANT HX SPEC: NORMAL

## 2023-03-09 ENCOUNTER — INFUSION THERAPY VISIT (OUTPATIENT)
Dept: INFUSION THERAPY | Facility: CLINIC | Age: 23
End: 2023-03-09
Attending: PEDIATRICS
Payer: COMMERCIAL

## 2023-03-09 ENCOUNTER — PATIENT OUTREACH (OUTPATIENT)
Dept: CARE COORDINATION | Facility: CLINIC | Age: 23
End: 2023-03-09
Payer: COMMERCIAL

## 2023-03-09 ENCOUNTER — NURSE TRIAGE (OUTPATIENT)
Dept: ONCOLOGY | Facility: CLINIC | Age: 23
End: 2023-03-09
Payer: COMMERCIAL

## 2023-03-09 VITALS
HEART RATE: 86 BPM | RESPIRATION RATE: 16 BRPM | DIASTOLIC BLOOD PRESSURE: 75 MMHG | OXYGEN SATURATION: 98 % | SYSTOLIC BLOOD PRESSURE: 120 MMHG

## 2023-03-09 DIAGNOSIS — G81.10 SPASTIC HEMIPLEGIA, UNSPECIFIED ETIOLOGY, UNSPECIFIED LATERALITY (H): Primary | ICD-10-CM

## 2023-03-09 DIAGNOSIS — D57.00 SICKLE CELL PAIN CRISIS (H): ICD-10-CM

## 2023-03-09 PROCEDURE — 96376 TX/PRO/DX INJ SAME DRUG ADON: CPT

## 2023-03-09 PROCEDURE — 96361 HYDRATE IV INFUSION ADD-ON: CPT

## 2023-03-09 PROCEDURE — 96374 THER/PROPH/DIAG INJ IV PUSH: CPT

## 2023-03-09 PROCEDURE — 250N000011 HC RX IP 250 OP 636: Performed by: PEDIATRICS

## 2023-03-09 PROCEDURE — 258N000003 HC RX IP 258 OP 636: Performed by: PEDIATRICS

## 2023-03-09 PROCEDURE — 250N000013 HC RX MED GY IP 250 OP 250 PS 637: Performed by: PEDIATRICS

## 2023-03-09 RX ORDER — HEPARIN SODIUM (PORCINE) LOCK FLUSH IV SOLN 100 UNIT/ML 100 UNIT/ML
5 SOLUTION INTRAVENOUS
Status: CANCELLED | OUTPATIENT
Start: 2023-07-01

## 2023-03-09 RX ORDER — ONDANSETRON 8 MG/1
8 TABLET, FILM COATED ORAL
Status: DISCONTINUED | OUTPATIENT
Start: 2023-03-09 | End: 2023-03-09 | Stop reason: HOSPADM

## 2023-03-09 RX ORDER — DIPHENHYDRAMINE HCL 25 MG
25 CAPSULE ORAL
Status: COMPLETED | OUTPATIENT
Start: 2023-03-09 | End: 2023-03-09

## 2023-03-09 RX ORDER — HEPARIN SODIUM,PORCINE 10 UNIT/ML
5 VIAL (ML) INTRAVENOUS
Status: DISCONTINUED | OUTPATIENT
Start: 2023-03-09 | End: 2023-03-09 | Stop reason: HOSPADM

## 2023-03-09 RX ORDER — ONDANSETRON 8 MG/1
8 TABLET, FILM COATED ORAL
Status: CANCELLED
Start: 2023-07-01

## 2023-03-09 RX ORDER — DIPHENHYDRAMINE HCL 25 MG
25 CAPSULE ORAL
Status: CANCELLED
Start: 2023-07-01

## 2023-03-09 RX ORDER — HEPARIN SODIUM,PORCINE 10 UNIT/ML
5 VIAL (ML) INTRAVENOUS
Status: CANCELLED | OUTPATIENT
Start: 2023-07-01

## 2023-03-09 RX ORDER — HEPARIN SODIUM (PORCINE) LOCK FLUSH IV SOLN 100 UNIT/ML 100 UNIT/ML
5 SOLUTION INTRAVENOUS
Status: DISCONTINUED | OUTPATIENT
Start: 2023-03-09 | End: 2023-03-09 | Stop reason: HOSPADM

## 2023-03-09 RX ADMIN — HYDROMORPHONE HYDROCHLORIDE 1 MG: 1 INJECTION, SOLUTION INTRAMUSCULAR; INTRAVENOUS; SUBCUTANEOUS at 13:42

## 2023-03-09 RX ADMIN — HYDROMORPHONE HYDROCHLORIDE 1 MG: 1 INJECTION, SOLUTION INTRAMUSCULAR; INTRAVENOUS; SUBCUTANEOUS at 14:49

## 2023-03-09 RX ADMIN — SODIUM CHLORIDE, POTASSIUM CHLORIDE, SODIUM LACTATE AND CALCIUM CHLORIDE 1000 ML: 600; 310; 30; 20 INJECTION, SOLUTION INTRAVENOUS at 12:46

## 2023-03-09 RX ADMIN — DIPHENHYDRAMINE HYDROCHLORIDE 25 MG: 25 CAPSULE ORAL at 12:48

## 2023-03-09 RX ADMIN — HYDROMORPHONE HYDROCHLORIDE 1 MG: 1 INJECTION, SOLUTION INTRAMUSCULAR; INTRAVENOUS; SUBCUTANEOUS at 12:48

## 2023-03-09 RX ADMIN — HEPARIN 5 ML: 100 SYRINGE at 15:12

## 2023-03-09 NOTE — TELEPHONE ENCOUNTER
Pt called in to triage requesting IVF pain meds for left side  pain rated 9/10 x 2 days. Stated last took prn oxycodone at 6am this morning without relief. Denied any fevers, chills, cough, sob, chest pain, numbness or tingling or other symptoms not typical of sickle cell pain.     Pt's last infusion was 3/6/23, last clinic visit 1/30/23 with follow-up on 3/27/23 with Dr Duncan.     Patient states they do not  have own ride and it will take 60 minutes long to get to cancer clinic.     Pt denied being out of home medications and needing any refills today.     Pt qualifies for sickle cell standing order protocol.    If you do not hear from the infusion center by 2pm then you will not be able to get in for an infusion today.     Has interview at 11-to 11:30 looking for an infusion appt after 11:30 am.     [facility] has opening at 1pm     Call placed to Jennifer and left message that a 1-pm appt was available at [facility]. I have contacted the social workers to arrange transportation.    Message sent to  to set up transportation

## 2023-03-09 NOTE — PROGRESS NOTES
Social Work Note: Transportation  Oncology Clinic     Data/Intervention:  Patient Name:  Jennifer Cervantes DOB/Age: 2000, 23 years old     Call From: [facility]        Reason for Call:  Transportation     Assessment:   called [employer] to arrange ride through patient's insurance. [employer] arranged  for patient from home with [employer].  Patient will need to call when ready for return ride home ([phone]).      Plan:  Patient is aware of the transportation plan.  available to assist with any other needs.      CARLOS Chavez,[facility]  Hematology/Oncology Social Worker  Phone:[phone] Pager: [phone]

## 2023-03-09 NOTE — PROGRESS NOTES
Infusion Nursing Note:  Jennifer Cervantes presents today for scheduled IVF and pain medication administration.    Patient seen by provider today: No   present during visit today: Not Applicable.    Note: Patient states that she hopes these infusions continue to help keep her pain from worsening, though it hasn't helped her pain improve.    Intravenous Access:  Peripheral IV placed.    Treatment Conditions:  Not Applicable.    Post Infusion Assessment:  Patient tolerated infusion without incident.  Site patent and intact, free from redness, edema or discomfort.  No evidence of extravasations.  Access discontinued per protocol.     Discharge Plan:   Discharge instructions reviewed with: Patient.  Patient and/or family verbalized understanding of discharge instructions and all questions answered.  Patient discharged in stable condition accompanied by: self.  Departure Mode: Ambulatory.      Aissatou Mims RN

## 2023-03-09 NOTE — RESULT ENCOUNTER NOTE
Dear Jennifer NEWMAN Billy   Good news your PAP was Normal NIL. There was no infection.  You may have PAP in 3 years due 3/2026 as this result was normal.  We recommend an annual preventive visit and other cares with your provider as indicated.  Best wishes,  Michael Dee MD

## 2023-03-09 NOTE — PROGRESS NOTES
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 She had multiple thromboembolic events in 2021 despite adherent anticoagulation use (though warfarin was perpetually low) and there are concerns for chronic thromboembolic disease but did not have pulmonary HTN on a November 2021 cath. She is maintained on chronic PO opioids and twice-weekly infusion visits (since 1/24/22) but has been able to be maintained on this regimen and has stayed out of the ED most of the time with even rarer admissions.     Interval History:  Jennifer is seen today in clinic for routine follow-up. She has had quite a bit of pain this week, today primarily in her low back and sides. Her mom lost her keys yesterday which resulted in the family spending about 2 hours outside. She hasn't felt well for over a week. She was in for infusion yesterday which did not help significantly. Aside from this current crisis, she has been doing well with her slow oxycodone taper and is now using 35 rather than 40 tablets over the course of 7 days. She denies any recent cough, shortness of breath or swelling in her extremities. Earlier today she did have a job interview which would be working in the membership department at [employer]. Her cousin currently works there and made the connection. She anticipates hearing back about this sometime next week.     She would like to receive Desferal infusions in clinic, stating the amount of time she is connected for the 48 hour infusion at home is  "too lengthy and often interferes with her sleep or scheduling different events. She also desires to speak to a therapist or mental health professional to develop techniques for handling conflict and communication with family members.     Sickle Cell Disease Comprehensive Checklist    Bone Health/Avascular Necrosis Screening/Symptoms (each visit): no new concerns today    Leg Ulcer evaluation (every visit): none    Hypertension (every visit):stable 3/10/23    Last pulmonary evaluation (asthma, AMAN, pulm HTN): 9/28/22    Stroke/silent cerebral infarct Hx (Y/N): Yes TIA ~2014, first event ~age 1 with full stroke and R sided weakness    Last PCP Visit: 3/6/23    Vaccines:  ? PCV13: 5/13/19  ? Pneumovax (PPSV23): 3/04, 10/09, 7/12/19 (next due 7/2024)  ? Menactra: 4/2010, 9/2015 (MCV done 8/16/21)  ? Influenza: 11/17/2022     Audiology (chelation): done 6/2020, normal.. However, on 6/7, \"While there is no significant change in grade on the CTCAE 4.03 Scale, there were hearing changes bilaterally for both standard and extended high frequency audiometry.  Will need to determine if an ENT consult is advised due to the asymmetry in extended high frequency testing.\"     Plan last reviewed with patient: 7/11/22    Patient background: 23 yo F, enjoys movies and kids though there are times where she does not really want to talk to people. Does not have a lot of social support at home.     Sickle Cell Disease History  Primary Hematologist Team: Jose Rafael Duncan  PCP: none  Genotype: SS  Acute Pain Crisis Treatment: (avoiding IV opioids for now, which she has agreed to)    ER   o Oxycodone 15-20 mg x1  o Ketamine 4mg IV x 2--helps with opioid sparing  o Toradol 15 mg IV x1   o Maintenance IV fluids with LR  o Other: Zofran 8 mg IV PRN nausea    Inpatient:  o Home oxycodone/Oxycontin regimen, though home oxycodone dosing could be increased to 20 mg to start  o PCA plan:   - None for now  o Other Medications: Zofran  o She has had " success with ketamine starting at 4mg/h and advancing only to 6mg/h, as 8mg/h made her feel quite poorly..  o ASA  o Supportive Care: Docusate, Senna  Chronic Pain Medications:    Home regimen Oxycontin 10 mg q12h, oxycodone 15 mg p.o. q.4-6 hours p.r.n. breakthrough pain.  She also continues on Voltaren gel, and Zoloft among other medications.    -Also benefits from mental health visits, acupuncture  Baseline Hemoglobin: 7 g/dl without chronic transfusions  Hydroxyurea use: Yes  H/O blood transfusions: Yes, several (iron overload) Most recent 11/20/2021    H/O Transfusion Reactions: no    Antibodies:none  H/O Acute Chest Syndrome: Yes    Last episode:9/05/22 (previously 4/26/21, 10/2019)     ICU/intubation: not with 9/2022 admission  H/O Stroke: Yes (managed with chronic transfusions in the past, stopped late Spring 2020)  H/O VTE: Yes (2/2021)  H/O Cholecystectomy or Splenectomy: no  H/O Asthma, Pulm HTN, AVN, Leg Ulcers, Nephropathy, Retinopathy, etc: Iron overload, asthma, chronic lung disease, physical limitations from early stroke    ---------------------------------------  Jennifer Cervantes's Goals (did not discuss today)    1-3 month goal:  Look for a job    6 month goal:  Continue to work on driving     12 month goal:  Move into her own place     Disease-specific goal(s):  Continue to stay out of the hospital, be off regular opioids in the future (previous goal was March 2023, now more likely June 2023).     Start to wean oxycodone.  ---------------------------------------      Current Outpatient Medications   Medication Sig Dispense Refill     acetaminophen (TYLENOL) 325 MG tablet Take 2 tablets (650 mg) by mouth every 6 hours as needed for mild pain 120 tablet 3     albuterol (PROVENTIL) (2.5 MG/3ML) 0.083% neb solution Take 2 vials (5 mg) by nebulization every 6 hours as needed for shortness of breath / dyspnea or wheezing 90 mL 3     aspirin (ASA) 81 MG chewable tablet Take 1 tablet (81 mg) by mouth 2 times  daily 60 tablet 11     budesonide-formoterol (SYMBICORT) 160-4.5 MCG/ACT Inhaler Inhale 2 puffs into the lungs 2 times daily 10.2 g 3     deferasirox (JADENU) 360 MG tablet Take 4 tablets (1,440 mg) by mouth every evening 120 tablet 4     diclofenac (VOLTAREN) 1 % topical gel Apply 4 g topically 4 times daily 350 g 0     FLUoxetine (PROZAC) 10 MG capsule Take 1 capsule (10 mg) by mouth daily For two weeks, then increase to 20 mg if 10 mg not effective 40 capsule 1     folic acid (FOLVITE) 1 MG tablet Take 1 tablet (1 mg) by mouth daily 30 tablet 0     Hydroxyurea 1000 MG TABS Take 3,000 mg by mouth daily 90 tablet 3     medroxyPROGESTERone (PROVERA) 2.5 MG tablet Take 1 tablet (2.5 mg) by mouth daily for 60 days 60 tablet 0     ondansetron (ZOFRAN) 8 MG tablet Take 1 tablet (8 mg) by mouth every 8 hours as needed 30 tablet 1     oxyCODONE IR (ROXICODONE) 15 MG tablet Take 1 tablet (15 mg) by mouth every 4 hours as needed for severe pain (7-10) Goal 4 per day. Max 6 per day. 35 tablet 0     traZODone (DESYREL) 50 MG tablet Take 1 tablet (50 mg) by mouth At Bedtime 30 tablet 1     EPINEPHrine (ANY BX GENERIC EQUIV) 0.3 MG/0.3ML injection 2-pack Inject 0.3 mLs (0.3 mg) into the muscle as needed for anaphylaxis (Patient not taking: Reported on 3/10/2023) 1 each 1     naloxone (NARCAN) 4 MG/0.1ML nasal spray Spray 4 mg into one nostril alternating nostrils as needed for opioid reversal every 2-3 minutes until assistance arrives (Patient not taking: Reported on 3/10/2023)         Past Medical History  Past Medical History:   Diagnosis Date     Anxiety      Bleeding disorder (H)      Blood clotting disorder (H)      Cerebral infarction (H) 2015     Cognitive developmental delay     low IQ. Please recognize when managing pain and planning with her     Depressive disorder      Hemiplegia and hemiparesis following cerebral infarction affecting right dominant side (H)     right hand contractures     Iron overload due to  repeated red blood cell transfusions      Migraines      Multiple subsegmental pulmonary emboli without acute cor pulmonale (H) 02/01/2021     Oppositional defiant behavior      Presence of intrauterine contraceptive device 2/18/2020     Superficial venous thrombosis of arm, right 03/25/2021     Uncomplicated asthma      Past Surgical History:   Procedure Laterality Date     AS INSERT TUNNELED CV 2 CATH W/O PORT/PUMP       CHOLECYSTECTOMY       CV RIGHT HEART CATH MEASUREMENTS RECORDED N/A 11/18/2021    Procedure: Right Heart Cath;  Surgeon: Jackson Stauffer MD;  Location:  HEART CARDIAC CATH LAB     INSERT PORT VASCULAR ACCESS Left 4/21/2021    Procedure: INSERTION, VASCULAR ACCESS PORT (NOT SURE ON SIDE UNTIL REMOVAL);  Surgeon: Rajan More MD;  Location: [facility] OR     IR CHEST PORT PLACEMENT > 5 YRS OF AGE  4/21/2021     IR CVC NON TUNNEL LINE REMOVAL  5/6/2021     IR CVC NON TUNNEL PLACEMENT > 5 YRS  04/07/2020     IR CVC NON TUNNEL PLACEMENT > 5 YRS  4/30/2021     IR CVC NON TUNNEL PLACEMENT > 5 YRS  9/7/2022     IR PORT REMOVAL LEFT  4/21/2021     REMOVE PORT VASCULAR ACCESS Left 4/21/2021    Procedure: REMOVAL, VASCULAR ACCESS PORT LEFT;  Surgeon: Rajan More MD;  Location: [facility] OR     REPAIR TENDON ELBOW Right 10/02/2019    Procedure: Right Elbow Flexor Lengthening, Flexor Pronator Slide Of Wrist and Finger, Thumb Adductor Lengthening;  Surgeon: Anai Franco MD;  Location: [facility] OR     TONSILLECTOMY Bilateral 10/02/2019    Procedure: Bilateral Tonsillectomy;  Surgeon: Farhana Guy MD;  Location: [facility] OR     ZZC BREAST REDUCTION (INCLUDES LIPO) TIER 3 Bilateral 04/23/2019     Allergies   Allergen Reactions     Contrast Dye      Hives and breathing issues     Fish-Derived Products Hives     Seafood Hives     Diagnostic X-Ray Materials      Gadolinium      Social History   Social History     Tobacco Use     Smoking status: Never     Smokeless tobacco: Never   Substance Use Topics      Alcohol use: Not Currently     Alcohol/week: 0.0 standard drinks     Drug use: Never    Still living at home with mom and extended family.     Past medical history and social history were reviewed.    Physical Examination:  /84   Pulse 89   Temp 98.4  F (36.9  C) (Oral)   Resp 16   Wt 67.6 kg (149 lb)   LMP 12/01/2019 (Approximate)   SpO2 96%   BMI 25.56 kg/m    Wt Readings from Last 10 Encounters:   03/10/23 67.6 kg (149 lb)   03/06/23 69.1 kg (152 lb 4.8 oz)   02/02/23 70.3 kg (155 lb)   01/30/23 68.9 kg (152 lb)   01/26/23 68.9 kg (152 lb)   01/15/23 68.9 kg (151 lb 14.4 oz)   01/02/23 74.3 kg (163 lb 12.8 oz)   12/24/22 74.8 kg (165 lb)   11/28/22 74.2 kg (163 lb 9.3 oz)   11/03/22 77.1 kg (170 lb)     General: Well-appearing female, appears uncomfortable d/t pain crisis.  Eyes: EOMI, PERRL. No scleral icterus.  Cardiovascular: RRR No murmurs, gallops, or rubs. No peripheral edema.  Respiratory: CTA No wheezes or crackles.  MSK: Contractured right arm and hand 2/2 stroke.  Neurologic: Grossly nonfocal. A/O x 4.  Skin: No rashes, petechiae, or bruising noted on exposed skin.    Laboratory Data:  Most Recent 3 CBC's:  Recent Labs   Lab Test 02/23/23 2144 01/30/23  1256 01/18/23  1600   WBC 12.6* 12.8* 17.0*   HGB 8.7* 7.8* 6.3*   MCV 90 79 78    682* 694*   ANEUTAUTO 9.0* 10.1* 13.0*    Most Recent 3 BMP's:  Recent Labs   Lab Test 02/23/23 2144 01/30/23  1256 01/18/23  1600    136 136   POTASSIUM 3.8 4.0 3.4   CHLORIDE 106 105 105   CO2 19* 21* 21*   BUN 5.7* 4.4* 4.5*   CR 0.46* 0.52 0.47*   ANIONGAP 13 10 10   MICAH 9.1 9.3 9.1   GLC 98 91 111*   PROTTOTAL 7.7 7.9 7.6   ALBUMIN 4.5 4.5 4.2    Most Recent 2 LFT's:  Recent Labs   Lab Test 02/23/23  2144 01/30/23  1256 01/18/23  1600   AST  --  26 29   ALT 29 15 18   ALKPHOS 83 84 76   BILITOTAL 2.8* 1.8* 3.4*      Lab Results   Component Value Date    RETP 20.6 (H) 02/23/2023     Most recent labs reviewed and interpreted by me  today.    Assessment and Plan:  1. Sickle Cell HgbSS Disease  2. Recent hospitalization with acute chest, pulmonary edema (1/2023)  3. Chronic Pain  4. 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 History of CVA  7. Hearing loss    Jennifer is struggling with increased pain this week. She continues to adhere to twice weekly infusion visits which she desires to keep at this time. She has met this maximum this week plus it is too late to receive pain medication in clinic today. She adamantly wants to avoid the ER if possible and will work on pain management at home this weekend.    I will discuss the possibility of clinic Desferal infusions with Dr. Duncan. This likely would involve a full clinic day if possible which she is agreeable to if this is possible.    Regarding her oxycodone, she would like to continue tapering down her pill count with each refill although I am hesitant to do so today given the severity of her pain. The next refill is due 3/13/23 and she would like to decrease at that time if possible. She also requested to see me back prior to her next appointment with Dr. Duncan on 3/27 so we will plan to further discuss the taper at that time.  At this time we will continue to maintain her infusion schedule at 2 days/week if needed.    Labs were not drawn today. We will plan to check those next week with one of her infusions which she is tentatively planning to request on Monday.     Plan:  -Continue Hydrea to 3000mg daily to help lessen frequency of sickle cell pain. Refilled today.  -Continue slow taper of oxycodone with next fill to reflect 30 tabs/week rather than 45 tablets. She can self-reduce infusion days/week but continue to keep the cap at 2/week for now.  -Continue infusion center visits limited to two times per week (Mondays and Fridays).Continue diligent home management with current medications, heat, rest, compression, warm baths.   -If unable  to manage at home can go to ED but continue to not do IV narcotics in the emergency room. Ketamine previously added to pain plan in ED  -Continue home Desferal infusions. Will discuss possibility of clinic infusions with Dr. Duncan. Last Ferriscan shows decreased iron deposition in liver, 31.6 mg/g dry tissue, previously greater than 43.   -Continue aspirin BID  -RTC 3/16 with me, 3/27 with  Dr. Duncan.     58 minutes spent on the date of the encounter doing chart review, review of test results, interpretation of tests, patient visit and documentation     Patricia Mantilla CNP  [facility]  [address] [place] [id number] [phone]

## 2023-03-10 ENCOUNTER — ONCOLOGY VISIT (OUTPATIENT)
Dept: ONCOLOGY | Facility: CLINIC | Age: 23
End: 2023-03-10
Attending: REGISTERED NURSE
Payer: COMMERCIAL

## 2023-03-10 VITALS
RESPIRATION RATE: 16 BRPM | HEART RATE: 89 BPM | SYSTOLIC BLOOD PRESSURE: 122 MMHG | TEMPERATURE: 98.4 F | DIASTOLIC BLOOD PRESSURE: 84 MMHG | BODY MASS INDEX: 25.56 KG/M2 | OXYGEN SATURATION: 96 % | WEIGHT: 149 LBS

## 2023-03-10 DIAGNOSIS — I82.611 SUPERFICIAL VENOUS THROMBOSIS OF ARM, RIGHT: ICD-10-CM

## 2023-03-10 DIAGNOSIS — D57.1 HB-SS DISEASE WITHOUT CRISIS (H): Primary | ICD-10-CM

## 2023-03-10 DIAGNOSIS — E83.111 IRON OVERLOAD DUE TO REPEATED RED BLOOD CELL TRANSFUSIONS: ICD-10-CM

## 2023-03-10 PROCEDURE — G0463 HOSPITAL OUTPT CLINIC VISIT: HCPCS | Performed by: REGISTERED NURSE

## 2023-03-10 PROCEDURE — 99215 OFFICE O/P EST HI 40 MIN: CPT | Performed by: REGISTERED NURSE

## 2023-03-10 RX ORDER — DEFERASIROX 360 MG/1
1440 TABLET, FILM COATED ORAL EVERY EVENING
Qty: 120 TABLET | Refills: 4 | Status: SHIPPED | OUTPATIENT
Start: 2023-03-10 | End: 2023-04-14

## 2023-03-10 RX ORDER — ASPIRIN 81 MG/1
81 TABLET, CHEWABLE ORAL 2 TIMES DAILY
Qty: 60 TABLET | Refills: 11 | Status: SHIPPED | OUTPATIENT
Start: 2023-03-10 | End: 2023-04-14

## 2023-03-10 ASSESSMENT — PAIN SCALES - GENERAL: PAINLEVEL: WORST PAIN (10)

## 2023-03-10 NOTE — NURSING NOTE
"Oncology Rooming Note    March 10, 2023 2:10 PM   Jennifer Cervantes is a 23 year old female who presents for:    Chief Complaint   Patient presents with     Oncology Clinic Visit     Sickle cell     Initial Vitals: /84   Pulse 89   Temp 98.4  F (36.9  C) (Oral)   Resp 16   Wt 67.6 kg (149 lb)   LMP 12/01/2019 (Approximate)   SpO2 96%   BMI 25.56 kg/m   Estimated body mass index is 25.56 kg/m  as calculated from the following:    Height as of 3/6/23: 1.626 m (5' 4.02\").    Weight as of this encounter: 67.6 kg (149 lb). Body surface area is 1.75 meters squared.  Worst Pain (10) Comment: Data Unavailable   Patient's last menstrual period was 12/01/2019 (approximate).  Allergies reviewed: Yes  Medications reviewed: Yes    Medications: Hydroxyurea, aspirin, jadenu   Pharmacy name entered into [facility]: [facility] [place], [place] - [address] [place] [address] [phone]    Clinical concerns: pain is bad today       Eulalia Broderick CMA            "

## 2023-03-13 ENCOUNTER — PATIENT OUTREACH (OUTPATIENT)
Dept: CARE COORDINATION | Facility: CLINIC | Age: 23
End: 2023-03-13

## 2023-03-13 ENCOUNTER — NURSE TRIAGE (OUTPATIENT)
Dept: ONCOLOGY | Facility: CLINIC | Age: 23
End: 2023-03-13

## 2023-03-13 ENCOUNTER — INFUSION THERAPY VISIT (OUTPATIENT)
Dept: TRANSPLANT | Facility: CLINIC | Age: 23
End: 2023-03-13
Attending: PEDIATRICS
Payer: COMMERCIAL

## 2023-03-13 VITALS
OXYGEN SATURATION: 94 % | RESPIRATION RATE: 16 BRPM | DIASTOLIC BLOOD PRESSURE: 68 MMHG | TEMPERATURE: 98.2 F | SYSTOLIC BLOOD PRESSURE: 112 MMHG | HEART RATE: 98 BPM

## 2023-03-13 DIAGNOSIS — D57.00 SICKLE CELL PAIN CRISIS (H): ICD-10-CM

## 2023-03-13 DIAGNOSIS — G81.10 SPASTIC HEMIPLEGIA, UNSPECIFIED ETIOLOGY, UNSPECIFIED LATERALITY (H): Primary | ICD-10-CM

## 2023-03-13 PROCEDURE — 250N000011 HC RX IP 250 OP 636: Performed by: PEDIATRICS

## 2023-03-13 PROCEDURE — 96374 THER/PROPH/DIAG INJ IV PUSH: CPT

## 2023-03-13 PROCEDURE — 96361 HYDRATE IV INFUSION ADD-ON: CPT

## 2023-03-13 PROCEDURE — 96376 TX/PRO/DX INJ SAME DRUG ADON: CPT

## 2023-03-13 PROCEDURE — 258N000003 HC RX IP 258 OP 636: Performed by: PEDIATRICS

## 2023-03-13 PROCEDURE — 250N000013 HC RX MED GY IP 250 OP 250 PS 637: Performed by: PEDIATRICS

## 2023-03-13 RX ORDER — OXYCODONE HYDROCHLORIDE 15 MG/1
15 TABLET ORAL EVERY 4 HOURS PRN
Qty: 30 TABLET | Refills: 0 | Status: SHIPPED | OUTPATIENT
Start: 2023-03-13 | End: 2023-03-20

## 2023-03-13 RX ORDER — HEPARIN SODIUM (PORCINE) LOCK FLUSH IV SOLN 100 UNIT/ML 100 UNIT/ML
5 SOLUTION INTRAVENOUS
Status: DISCONTINUED | OUTPATIENT
Start: 2023-03-13 | End: 2023-03-13 | Stop reason: HOSPADM

## 2023-03-13 RX ORDER — DIPHENHYDRAMINE HCL 25 MG
25 CAPSULE ORAL
Status: CANCELLED
Start: 2023-07-01

## 2023-03-13 RX ORDER — HEPARIN SODIUM (PORCINE) LOCK FLUSH IV SOLN 100 UNIT/ML 100 UNIT/ML
5 SOLUTION INTRAVENOUS
Status: CANCELLED | OUTPATIENT
Start: 2023-07-01

## 2023-03-13 RX ORDER — HEPARIN SODIUM,PORCINE 10 UNIT/ML
5 VIAL (ML) INTRAVENOUS
Status: CANCELLED | OUTPATIENT
Start: 2023-07-01

## 2023-03-13 RX ORDER — ONDANSETRON 8 MG/1
8 TABLET, FILM COATED ORAL
Status: CANCELLED
Start: 2023-07-01

## 2023-03-13 RX ORDER — DIPHENHYDRAMINE HCL 25 MG
25 CAPSULE ORAL
Status: COMPLETED | OUTPATIENT
Start: 2023-03-13 | End: 2023-03-13

## 2023-03-13 RX ADMIN — SODIUM CHLORIDE, POTASSIUM CHLORIDE, SODIUM LACTATE AND CALCIUM CHLORIDE 1000 ML: 600; 310; 30; 20 INJECTION, SOLUTION INTRAVENOUS at 10:56

## 2023-03-13 RX ADMIN — DIPHENHYDRAMINE HYDROCHLORIDE 25 MG: 25 CAPSULE ORAL at 10:55

## 2023-03-13 RX ADMIN — HYDROMORPHONE HYDROCHLORIDE 1 MG: 1 INJECTION, SOLUTION INTRAMUSCULAR; INTRAVENOUS; SUBCUTANEOUS at 12:57

## 2023-03-13 RX ADMIN — Medication 5 ML: at 13:36

## 2023-03-13 RX ADMIN — HYDROMORPHONE HYDROCHLORIDE 1 MG: 1 INJECTION, SOLUTION INTRAMUSCULAR; INTRAVENOUS; SUBCUTANEOUS at 10:56

## 2023-03-13 RX ADMIN — HYDROMORPHONE HYDROCHLORIDE 1 MG: 1 INJECTION, SOLUTION INTRAMUSCULAR; INTRAVENOUS; SUBCUTANEOUS at 12:00

## 2023-03-13 ASSESSMENT — PAIN SCALES - GENERAL: PAINLEVEL: EXTREME PAIN (9)

## 2023-03-13 NOTE — PROGRESS NOTES
Infusion Nursing Note:  Jennifer Cervantes presents today for IV fluids and pain medications.    Patient seen by provider today: No   present during visit today: Not Applicable.    Note:   Pt received a liter of LR at 500 ml/hr. Received 1 mg dilaudid every hour x 3 doses (3 mg dilaudid total). Received 25 mg benadryl po.  Pain down to 5/10 at discharge, feels ok to go home.    Pt received refills for oxycodone; brought to bedside by pharmacy.    Intravenous Access:  Implanted Port.    Treatment Conditions:  No labs today.    Post Infusion Assessment:  Patient tolerated infusion without incident.  Blood return noted pre and post infusion.  Site patent and intact, free from redness, edema or discomfort.  No evidence of extravasations.  Access discontinued per protocol.     Discharge Plan:   Patient discharged in stable condition accompanied by: self.  Departure Mode: Ambulatory.      Jamaica Napoles RN

## 2023-03-13 NOTE — PROGRESS NOTES
Social Work Note: Transportation  Oncology Clinic     Data/Intervention:  Patient Name:  Jennifer Cervantes  /Age: 2000, 23 years old     Call From: [facility]        Reason for Call:  Transportation     Assessment:   called [employer] to arrange ride. [employer] arranged  for patient from home with will call return ride.  Patient will need to call when ready for return ride home ([phone]).      Plan:  Patient is aware of the transportation plan.  available to assist with any other needs.      CARLOS Chavez,SW  Hematology/Oncology Social Worker  Phone:[phone] Pager: [phone]

## 2023-03-13 NOTE — TELEPHONE ENCOUNTER
Oncology Nurse Triage - Sickle Cell Pain Crisis:    Situation: Jennifer  calling about Sickle Cell Pain Crisis    Background:     Patient's last infusion was 03/09/34   Last clinic visit date:03/10/23 Jose Rafael  Next follow-up appt on 03/16/23  Does patient have active treatment plan?  Yes    (If pt has been seen in ED or infusion in last 3 days or no showed last clinic visit then does not meet protocol unless specified in pt therapy plan)    Assessment of Symptoms:  Onset/Duration of symptoms: 2 day    Is it typical sickle cell pain? Yes   Location: Thighs  Character: Sharp           Intensity: 9/10    Any radiation of pain, numbness, tingling, weakness, warmth, swelling, discoloration of extremities?No     Fever?NO  (if yes max temperature recorded in last 24 hours):      Chest Pain Present: No     Shortness of breath: No     Other home therapies tried: HEAT/HEATING PAD and WARM BATH     Last home medication taken and when: Oxycodone 6 AM this morning    Any Refills Needed?: No     Does patient have transportation & length of time to get to clinic: Yes       Grades for reference:       Grade 1 -   o Patient has active treatment plan.   o Patients last scheduled clinic appointment was attended  o Patient has not been seen in infusion or ED in the last 3 days (clarify exclusions in therapy plans)   o Afebrile   o Typical sickle cell pain   o Home medications have been tried   o No other symptoms       Recommendations:     0751 Pt added on for IVF/Pain meds at BMT 11:00am. Pt notified. Requested SW contacted pt to assist in obtaining transportation.         If you do not hear from the infusion center by 2pm then you will not be able to get in for an infusion today. If symptoms worsen while waiting for call back, and/or you experience fever, chills, SOB, chest pain, cough, n/v, dizziness, numbness, swelling, discoloration of extremities, then seek emergency evaluation in Emergency Department.     Please note, if you are  late for your appt, you risk losing your infusion appt as it may delay another patient's infusion who arrived on time.

## 2023-03-13 NOTE — NURSING NOTE
"Oncology Rooming Note    March 13, 2023 12:23 PM   Jennifer Cervantes is a 23 year old female who presents for:    Chief Complaint   Patient presents with     Infusion     Add on infusion for IVF and pain medications. Hx sickle cell disease.     Initial Vitals: /68   Pulse 98   Temp 98.2  F (36.8  C) (Oral)   Resp 16   LMP 12/01/2019 (Approximate)   SpO2 94%  Estimated body mass index is 25.56 kg/m  as calculated from the following:    Height as of 3/6/23: 1.626 m (5' 4.02\").    Weight as of 3/10/23: 67.6 kg (149 lb). There is no height or weight on file to calculate BSA.  Extreme Pain (9) Comment: Data Unavailable   Patient's last menstrual period was 12/01/2019 (approximate).  Allergies reviewed: Yes  Medications reviewed: Yes    Medications: Medication refills not needed today.  Pharmacy name entered into [facility]: [facility] [place], [place] - [phone]    Clinical concerns: none       Jamaica Napoles RN              "

## 2023-03-13 NOTE — TELEPHONE ENCOUNTER
Narcotic Refill Request    Medication(s) requested:  Oxycodone  Person Requesting Refill:Patient  What pain is the medication treating: Sickle cell pain  How is the medication being taken?: Every 4hrs. She does not take more 6 tablets per day  Is pain being adequately controlled on the current regimen?: No  Experiencing any side effects from medication?: No          Last clinic visit date:03/10/23 w/ Patricia Mantilla  Next follow-up appt on 03/16/23  Any No Show Visits:No  Last fill date and by whom:  03/06/23   Reviewed: No    Routed provider: Patricia Mantilla

## 2023-03-15 ENCOUNTER — TELEPHONE (OUTPATIENT)
Dept: BEHAVIORAL HEALTH | Facility: CLINIC | Age: 23
End: 2023-03-15
Payer: COMMERCIAL

## 2023-03-15 NOTE — PROGRESS NOTES
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 She had multiple thromboembolic events in 2021 despite adherent anticoagulation use (though warfarin was perpetually low) and there are concerns for chronic thromboembolic disease but did not have pulmonary HTN on a November 2021 cath. She is maintained on chronic PO opioids and twice-weekly infusion visits (since 1/24/22) but has been able to be maintained on this regimen and has stayed out of the ED most of the time with even rarer admissions.     Interval History:  Jennifer is seen back today for short interval follow-up per her request. She has had a persistent cough this week without other symptoms. Over the weekend she had an isolated temperature of 101. She had viral cold symptoms 3 weeks ago which have resolved. The cough started after resolution of these other symptoms. She has been laying low at home in hopes to avoid a hospital admission and also due to some conflict with her family and significant other. She reports diffuse pain, most severe in her left lower back today, and requests to receive IVF and pain medication in the infusion center if possible.     Sickle Cell Disease Comprehensive Checklist    Bone Health/Avascular Necrosis Screening/Symptoms (each visit): no new concerns today    Leg Ulcer evaluation (every visit): none    Hypertension (every visit):stable 3/10/23    Last pulmonary evaluation (asthma, AMAN, pulm HTN): 9/28/22    Stroke/silent cerebral infarct Hx (Y/N): Yes  "TIA ~2014, first event ~age 1 with full stroke and R sided weakness    Last PCP Visit: 3/6/23    Vaccines:  ? PCV13: 5/13/19  ? Pneumovax (PPSV23): 3/04, 10/09, 7/12/19 (next due 7/2024)  ? Menactra: 4/2010, 9/2015 (MCV done 8/16/21)  ? Influenza: 11/17/2022     Audiology (chelation): done 6/2020, normal.. However, on 6/7, \"While there is no significant change in grade on the CTCAE 4.03 Scale, there were hearing changes bilaterally for both standard and extended high frequency audiometry.  Will need to determine if an ENT consult is advised due to the asymmetry in extended high frequency testing.\"     Plan last reviewed with patient: 7/11/22    Patient background: 23 yo F, enjoys movies and kids though there are times where she does not really want to talk to people. Does not have a lot of social support at home.     Sickle Cell Disease History  Primary Hematologist Team: Jose Rafael Duncan  PCP: none  Genotype: SS  Acute Pain Crisis Treatment: (avoiding IV opioids for now, which she has agreed to)    ER   o Oxycodone 15-20 mg x1  o Ketamine 4mg IV x 2--helps with opioid sparing  o Toradol 15 mg IV x1   o Maintenance IV fluids with LR  o Other: Zofran 8 mg IV PRN nausea    Inpatient:  o Home oxycodone/Oxycontin regimen, though home oxycodone dosing could be increased to 20 mg to start  o PCA plan:   - None for now  o Other Medications: Zofran  o She has had success with ketamine starting at 4mg/h and advancing only to 6mg/h, as 8mg/h made her feel quite poorly..  o ASA  o Supportive Care: Docusate, Senna  Chronic Pain Medications:    Home regimen Oxycontin 10 mg q12h, oxycodone 15 mg p.o. q.4-6 hours p.r.n. breakthrough pain.  She also continues on Voltaren gel, and Zoloft among other medications.    -Also benefits from mental health visits, acupuncture  Baseline Hemoglobin: 7 g/dl without chronic transfusions  Hydroxyurea use: Yes  H/O blood transfusions: Yes, several (iron overload) Most recent 11/20/2021    H/O " Transfusion Reactions: no    Antibodies:none  H/O Acute Chest Syndrome: Yes    Last episode:9/05/22 (previously 4/26/21, 10/2019)     ICU/intubation: not with 9/2022 admission  H/O Stroke: Yes (managed with chronic transfusions in the past, stopped late Spring 2020)  H/O VTE: Yes (2/2021)  H/O Cholecystectomy or Splenectomy: no  H/O Asthma, Pulm HTN, AVN, Leg Ulcers, Nephropathy, Retinopathy, etc: Iron overload, asthma, chronic lung disease, physical limitations from early stroke    ---------------------------------------  Jennifer Cervantes's Goals (discussed toady 3/16/23)    1-3 month goal:  Look for a job    6 month goal:      12 month goal:  Move into her own place     Disease-specific goal(s):  Continue to wean oxycodone down every 3-4 weeks  ---------------------------------------      Current Outpatient Medications   Medication Sig Dispense Refill     acetaminophen (TYLENOL) 325 MG tablet Take 2 tablets (650 mg) by mouth every 6 hours as needed for mild pain 120 tablet 3     albuterol (PROVENTIL) (2.5 MG/3ML) 0.083% neb solution Take 2 vials (5 mg) by nebulization every 6 hours as needed for shortness of breath / dyspnea or wheezing 90 mL 3     aspirin (ASA) 81 MG chewable tablet Take 1 tablet (81 mg) by mouth 2 times daily 60 tablet 11     budesonide-formoterol (SYMBICORT) 160-4.5 MCG/ACT Inhaler Inhale 2 puffs into the lungs 2 times daily 10.2 g 3     deferasirox (JADENU) 360 MG tablet Take 4 tablets (1,440 mg) by mouth every evening 120 tablet 4     diclofenac (VOLTAREN) 1 % topical gel Apply 4 g topically 4 times daily 350 g 0     EPINEPHrine (ANY BX GENERIC EQUIV) 0.3 MG/0.3ML injection 2-pack Inject 0.3 mLs (0.3 mg) into the muscle as needed for anaphylaxis (Patient not taking: Reported on 3/10/2023) 1 each 1     FLUoxetine (PROZAC) 10 MG capsule Take 1 capsule (10 mg) by mouth daily For two weeks, then increase to 20 mg if 10 mg not effective 40 capsule 1     folic acid (FOLVITE) 1 MG tablet Take 1 tablet  (1 mg) by mouth daily 30 tablet 0     Hydroxyurea 1000 MG TABS Take 3,000 mg by mouth daily 90 tablet 3     medroxyPROGESTERone (PROVERA) 2.5 MG tablet Take 1 tablet (2.5 mg) by mouth daily for 60 days 60 tablet 0     naloxone (NARCAN) 4 MG/0.1ML nasal spray Spray 4 mg into one nostril alternating nostrils as needed for opioid reversal every 2-3 minutes until assistance arrives (Patient not taking: Reported on 3/10/2023)       ondansetron (ZOFRAN) 8 MG tablet Take 1 tablet (8 mg) by mouth every 8 hours as needed 30 tablet 1     oxyCODONE IR (ROXICODONE) 15 MG tablet Take 1 tablet (15 mg) by mouth every 4 hours as needed for severe pain (7-10) Goal 4 per day. Max 6 per day. 30 tablet 0     traZODone (DESYREL) 50 MG tablet Take 1 tablet (50 mg) by mouth At Bedtime 30 tablet 1       Past Medical History  Past Medical History:   Diagnosis Date     Anxiety      Bleeding disorder (H)      Blood clotting disorder (H)      Cerebral infarction (H) 2015     Cognitive developmental delay     low IQ. Please recognize when managing pain and planning with her     Depressive disorder      Hemiplegia and hemiparesis following cerebral infarction affecting right dominant side (H)     right hand contractures     Iron overload due to repeated red blood cell transfusions      Migraines      Multiple subsegmental pulmonary emboli without acute cor pulmonale (H) 02/01/2021     Oppositional defiant behavior      Presence of intrauterine contraceptive device 2/18/2020     Superficial venous thrombosis of arm, right 03/25/2021     Uncomplicated asthma      Past Surgical History:   Procedure Laterality Date     AS INSERT TUNNELED CV 2 CATH W/O PORT/PUMP       CHOLECYSTECTOMY       CV RIGHT HEART CATH MEASUREMENTS RECORDED N/A 11/18/2021    Procedure: Right Heart Cath;  Surgeon: Jackson Stauffer MD;  Location:  HE[facility] CARD[facility] CATH LAB     INSERT PORT VASCULAR ACCESS Left 4/21/2021    Procedure: INSERTION, VASCULAR ACCESS PORT (NOT SURE ON  SIDE UNTIL REMOVAL);  Surgeon: Rajan More MD;  Location: [facility] OR     IR CHEST PORT PLACEMENT > 5 YRS OF AGE  4/21/2021     IR CVC NON TUNNEL LINE REMOVAL  5/6/2021     IR CVC NON TUNNEL PLACEMENT > 5 YRS  04/07/2020     IR CVC NON TUNNEL PLACEMENT > 5 YRS  4/30/2021     IR CVC NON TUNNEL PLACEMENT > 5 YRS  9/7/2022     IR PORT REMOVAL LEFT  4/21/2021     REMOVE PORT VASCULAR ACCESS Left 4/21/2021    Procedure: REMOVAL, VASCULAR ACCESS PORT LEFT;  Surgeon: Rajan More MD;  Location: [facility] OR     REPAIR TENDON ELBOW Right 10/02/2019    Procedure: Right Elbow Flexor Lengthening, Flexor Pronator Slide Of Wrist and Finger, Thumb Adductor Lengthening;  Surgeon: Anai Franco MD;  Location: [facility]     TONSILLECTOMY Bilateral 10/02/2019    Procedure: Bilateral Tonsillectomy;  Surgeon: Farhana Guy MD;  Location: [facility] OR     ZZC BREAST REDUCTION (INCLUDES LIPO) TIER 3 Bilateral 04/23/2019     Allergies   Allergen Reactions     Contrast Dye      Hives and breathing issues     Fish-Derived Products Hives     Seafood Hives     Diagnostic X-Ray Materials      Gadolinium      Social History   Social History     Tobacco Use     Smoking status: Never     Smokeless tobacco: Never   Substance Use Topics     Alcohol use: Not Currently     Alcohol/week: 0.0 standard drinks     Drug use: Never    Still living at home with mom and extended family.     Past medical history and social history were reviewed.    Physical Examination:  /71   Pulse 94   Temp 97.8  F (36.6  C) (Oral)   Resp 16   Wt 68.1 kg (150 lb 3.2 oz)   LMP 12/01/2019 (Approximate)   SpO2 94%   BMI 25.77 kg/m    Wt Readings from Last 10 Encounters:   03/16/23 68.1 kg (150 lb 3.2 oz)   03/10/23 67.6 kg (149 lb)   03/06/23 69.1 kg (152 lb 4.8 oz)   02/02/23 70.3 kg (155 lb)   01/30/23 68.9 kg (152 lb)   01/26/23 68.9 kg (152 lb)   01/15/23 68.9 kg (151 lb 14.4 oz)   01/02/23 74.3 kg (163 lb 12.8 oz)   12/24/22 74.8 kg (165 lb)   11/28/22  74.2 kg (163 lb 9.3 oz)     General: Well-appearing female, NAD  Eyes: EOMI, PERRL. No scleral icterus.  Cardiovascular: RRR No murmurs, gallops, or rubs. No peripheral edema.  Respiratory: CTA bilaterally. No wheezes or crackles.  MSK: Contractured right arm and hand 2/2 stroke.  Neurologic: Grossly nonfocal. A/O x 4.  Skin: No rashes, petechiae, or bruising noted on exposed skin.    Laboratory Data:  Most Recent 3 CBC's:  Recent Labs   Lab Test 03/16/23  0847 02/23/23 2144 01/30/23  1256   WBC 7.1 12.6* 12.8*   HGB 6.5* 8.7* 7.8*   MCV 84 90 79    445 682*   ANEUTAUTO 3.6 9.0* 10.1*    Most Recent 3 BMP's:  Recent Labs   Lab Test 03/16/23  0847 02/23/23 2144 01/30/23  1256    138 136   POTASSIUM 3.9 3.8 4.0   CHLORIDE 108* 106 105   CO2 21* 19* 21*   BUN 8.2 5.7* 4.4*   CR 0.56 0.46* 0.52   ANIONGAP 10 13 10   MICAH 9.0 9.1 9.3   GLC 92 98 91   PROTTOTAL 7.4 7.7 7.9   ALBUMIN 4.2 4.5 4.5    Most Recent 2 LFT's:  Recent Labs   Lab Test 03/16/23  0847 02/23/23 2144 01/30/23  1256   AST 42*  --  26   ALT 32 29 15   ALKPHOS 73 83 84   BILITOTAL 3.5* 2.8* 1.8*      Lab Results   Component Value Date    RETP 16.4 (H) 03/16/2023        Narrative & Impression   Exam: XR CHEST 2 VIEWS, 3/16/2023 7:45 AM     Comparison: 1/12/2023, 1/11/2023     History: Acute cough     Findings:  Left Port-A-Cath tip projects over the cavoatrial junction.  Cholecystectomy clips in the upper abdomen.     Cardiac silhouette is within normal limits. No pneumothorax. Blunting  of the left costophrenic angle. Mild bibasilar streaky opacities. No  acute osseous abnormality.                                                                      Impression:   1. Small left pleural effusion.  2. Mild bibasilar streaky opacities, likely atelectasis and/or edema.     I have personally reviewed the examination and initial interpretation  and I agree with the findings.     MANSI GAFFNEY MD        Most recent labs and imaging reviewed and  interpreted by me today.    Assessment and Plan:  1. Sickle Cell HgbSS Disease  2. Recent hospitalization with acute chest, pulmonary edema (1/2023)  3. Chronic Pain  4. 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 History of CVA  7. Hearing loss    Jennifer continue to adhere to twice weekly infusion visits which we will continue at this time. Infusion is able to take her today for IV fluids and pain medication which will be her second infusion this week. Labs today reveal decreased hgb to 6.5, normal WBC at 7.1 and mild AST elevation. CXR performed due to persistent cough showing small left pleural effusion and mild bibasilar atelectasis without focal infiltrate.    I informed Jennifer that I had discussed her desire to receive Desferal infusions in clinic with Dr. Duncan which would not be logistically feasible due to the lengthy infusion requirement to make the Desferal effective She does not wish to try dual oral chelation as she had significant difficulty with this last time due to the pill size. With the effect this is having on her quality of life it would be reasonable to take a break from the infusions which she will consider. A repeat Ferriscan would be done prior to finalizing this consideration. She will discuss this further with Dr. Duncan at her upcoming appointment 3/27/23.     The oxycodone prescription was further tapered to 30 tablets per fill on 3/13/23. Jennifer will continue to refill her prescriptions on Mondays. She remains motivated to continue to taper although is somewhat hesitant to name an end date when we expect her to be off of opioids. Previously she mentioned she'd like to be off of oxycodone by June. It seems reasonable to keep this open-ended right now since we're heading in the right direction. She would like to see me monthly to ensure we are following up on a regular basis to further encourage her tapering pain which is  reasonable.      Plan:  -Continue Hydrea to 3000mg daily to help lessen frequency of sickle cell pain. Refilled today.  -Continue slow taper of oxycodone, currently at 30 tablets per refill with plan to fill every Monday. She can continue the frequency at every 4 hours with a max of 6 tablets per day but with the decreased tablets per refill should aim for a max of 4 tablets per day. She can self-reduce infusion days/week but continue to keep the cap at 2/week for now.  -Continue infusion center visits limited to two times per week (Mondays and Fridays). Continue diligent home management with current medications, heat, rest, compression, warm baths.   -If unable to manage at home can go to ED but continue to not do IV narcotics in the emergency room. Ketamine previously added to pain plan in ED  -Continue home Desferal infusions for the time being. Will further discuss possible break with Dr. Duncan at her upcoming visit. Ferriscan to be done before confirming that plan.   -Continue aspirin BID  -RTC 3/27 with  Dr. Duncan, monthly with me on Mondays or Fridays to coordinate with anticipated      50 minutes spent on the date of the encounter doing chart review, review of test results, interpretation of tests, patient visit and documentation     Patricia Mantilla CNP  [facility]  [address] [place] [id number] [phone]

## 2023-03-15 NOTE — TELEPHONE ENCOUNTER
Writer spoke w/ pt and scheduled therapy w/ [facility] for 3.21.2023 at 6pm. Writer sent intake questionnaire via  [employer]. Writer informed referral source of scheduled appointment and completed tracker.     Nora Ortiz ADRIAN   3.15.2023  1245    ----- Message from Lis Petty sent at 3/15/2023 12:32 PM CDT -----  Regarding: Bridge therapy  Transition Clinic Referral   [place]         Please Check Type of Referral Requested:       __x__THERAPY: The Transition clinic is able to schedule patients without current medical insurance; these patient will be referred to our Social Work Care Coordinator for Medical Insurance              Assistance. We are open for referral for psychotherapy. Patient is referred from:  Outpatient Intake      ____MEDICATION:  Referrals for Medication are ONLY accepted from the following areas (select):                                        Suboxone and Opioid Management Referrals are automatically denied. [facility] Psychiatry cannot see patient without active medical insurance.   [facility] Psychiatry cannot accept patient with next level of care scheduled with PCP      GUARDIAN: If your patient is not their own Guardian, please provide the following:    Guardian Name:  Guardian Contact Information (Phone & Email) :  Guardian Address:     FOSTER CARE PROVIDER: If your patient lives at a Licensed Foster Care, please provide the following:    Foster Provider:  Foster Provider Contact Information (Phone & Email):  Foster Provider Address:         Referring Provider Contact Name: Patricia Mantilla; Phone Number: [phone]    Reason for Transition Clinic Referral: Bridge adult therapy, Patient desires individual counseling to address coping and communication skills to enhance her family relationship    Next Level of Care Patient Will Be Transitioned To: [facility]  Provider(s)Lis Chou  Location [place]  Date/Time July 24th 2023   1030 am    What Would Be Helpful from the Transition Clinic: Patient desires  individual counseling to address coping and communication skills to enhance her family relationship       Needs: NO    Does Patient Have Access to Technology: yes MyC    Patient E-mail Address: [email or web address]    Current Patient Phone Number: [phone];     Clinician Gender Preference (if applicable): YES: female    Patient location preference: [facility]    Lis Petty

## 2023-03-16 ENCOUNTER — ANCILLARY PROCEDURE (OUTPATIENT)
Dept: GENERAL RADIOLOGY | Facility: CLINIC | Age: 23
End: 2023-03-16
Attending: REGISTERED NURSE
Payer: COMMERCIAL

## 2023-03-16 ENCOUNTER — ANCILLARY PROCEDURE (OUTPATIENT)
Dept: ULTRASOUND IMAGING | Facility: CLINIC | Age: 23
End: 2023-03-16
Attending: PEDIATRICS
Payer: COMMERCIAL

## 2023-03-16 ENCOUNTER — ONCOLOGY VISIT (OUTPATIENT)
Dept: ONCOLOGY | Facility: CLINIC | Age: 23
End: 2023-03-16
Attending: REGISTERED NURSE
Payer: COMMERCIAL

## 2023-03-16 VITALS
DIASTOLIC BLOOD PRESSURE: 71 MMHG | SYSTOLIC BLOOD PRESSURE: 107 MMHG | TEMPERATURE: 97.8 F | BODY MASS INDEX: 25.77 KG/M2 | RESPIRATION RATE: 16 BRPM | HEART RATE: 94 BPM | WEIGHT: 150.2 LBS | OXYGEN SATURATION: 94 %

## 2023-03-16 DIAGNOSIS — N93.9 VAGINAL BLEEDING: ICD-10-CM

## 2023-03-16 DIAGNOSIS — E83.111 IRON OVERLOAD DUE TO REPEATED RED BLOOD CELL TRANSFUSIONS: ICD-10-CM

## 2023-03-16 DIAGNOSIS — D57.1 HB-SS DISEASE WITHOUT CRISIS (H): ICD-10-CM

## 2023-03-16 DIAGNOSIS — D57.00 SICKLE CELL PAIN CRISIS (H): ICD-10-CM

## 2023-03-16 DIAGNOSIS — D57.1 HB-SS DISEASE WITHOUT CRISIS (H): Primary | ICD-10-CM

## 2023-03-16 DIAGNOSIS — R05.1 ACUTE COUGH: ICD-10-CM

## 2023-03-16 DIAGNOSIS — G81.10 SPASTIC HEMIPLEGIA, UNSPECIFIED ETIOLOGY, UNSPECIFIED LATERALITY (H): Primary | ICD-10-CM

## 2023-03-16 LAB
ALBUMIN SERPL BCG-MCNC: 4.2 G/DL (ref 3.5–5.2)
ALP SERPL-CCNC: 73 U/L (ref 35–104)
ALT SERPL W P-5'-P-CCNC: 32 U/L (ref 10–35)
ANION GAP SERPL CALCULATED.3IONS-SCNC: 10 MMOL/L (ref 7–15)
AST SERPL W P-5'-P-CCNC: 42 U/L (ref 10–35)
BASOPHILS # BLD AUTO: 0.2 10E3/UL (ref 0–0.2)
BASOPHILS NFR BLD AUTO: 3 %
BILIRUB SERPL-MCNC: 3.5 MG/DL
BUN SERPL-MCNC: 8.2 MG/DL (ref 6–20)
CALCIUM SERPL-MCNC: 9 MG/DL (ref 8.6–10)
CHLORIDE SERPL-SCNC: 108 MMOL/L (ref 98–107)
CREAT SERPL-MCNC: 0.56 MG/DL (ref 0.51–0.95)
DEPRECATED HCO3 PLAS-SCNC: 21 MMOL/L (ref 22–29)
EOSINOPHIL # BLD AUTO: 0.4 10E3/UL (ref 0–0.7)
EOSINOPHIL NFR BLD AUTO: 5 %
ERYTHROCYTE [DISTWIDTH] IN BLOOD BY AUTOMATED COUNT: 23.9 % (ref 10–15)
GFR SERPL CREATININE-BSD FRML MDRD: >90 ML/MIN/1.73M2
GLUCOSE SERPL-MCNC: 92 MG/DL (ref 70–99)
HCT VFR BLD AUTO: 18.6 % (ref 35–47)
HGB BLD-MCNC: 6.5 G/DL (ref 11.7–15.7)
IMM GRANULOCYTES # BLD: 0 10E3/UL
IMM GRANULOCYTES NFR BLD: 1 %
LYMPHOCYTES # BLD AUTO: 2.1 10E3/UL (ref 0.8–5.3)
LYMPHOCYTES NFR BLD AUTO: 30 %
MCH RBC QN AUTO: 29.3 PG (ref 26.5–33)
MCHC RBC AUTO-ENTMCNC: 34.9 G/DL (ref 31.5–36.5)
MCV RBC AUTO: 84 FL (ref 78–100)
MONOCYTES # BLD AUTO: 0.7 10E3/UL (ref 0–1.3)
MONOCYTES NFR BLD AUTO: 11 %
NEUTROPHILS # BLD AUTO: 3.6 10E3/UL (ref 1.6–8.3)
NEUTROPHILS NFR BLD AUTO: 50 %
NRBC # BLD AUTO: 0.2 10E3/UL
NRBC BLD AUTO-RTO: 3 /100
PLATELET # BLD AUTO: 440 10E3/UL (ref 150–450)
POTASSIUM SERPL-SCNC: 3.9 MMOL/L (ref 3.4–5.3)
PROT SERPL-MCNC: 7.4 G/DL (ref 6.4–8.3)
RBC # BLD AUTO: 2.22 10E6/UL (ref 3.8–5.2)
RETICS # AUTO: 0.37 10E6/UL (ref 0.03–0.1)
RETICS/RBC NFR AUTO: 16.4 % (ref 0.5–2)
SODIUM SERPL-SCNC: 139 MMOL/L (ref 136–145)
WBC # BLD AUTO: 7.1 10E3/UL (ref 4–11)

## 2023-03-16 PROCEDURE — 71046 X-RAY EXAM CHEST 2 VIEWS: CPT | Mod: GC | Performed by: RADIOLOGY

## 2023-03-16 PROCEDURE — 96376 TX/PRO/DX INJ SAME DRUG ADON: CPT

## 2023-03-16 PROCEDURE — 85004 AUTOMATED DIFF WBC COUNT: CPT

## 2023-03-16 PROCEDURE — 85045 AUTOMATED RETICULOCYTE COUNT: CPT

## 2023-03-16 PROCEDURE — 36591 DRAW BLOOD OFF VENOUS DEVICE: CPT

## 2023-03-16 PROCEDURE — G0463 HOSPITAL OUTPT CLINIC VISIT: HCPCS | Mod: 25 | Performed by: REGISTERED NURSE

## 2023-03-16 PROCEDURE — 250N000011 HC RX IP 250 OP 636: Performed by: PEDIATRICS

## 2023-03-16 PROCEDURE — 99207 PR NO BILLABLE SERVICE THIS VISIT: CPT

## 2023-03-16 PROCEDURE — 99215 OFFICE O/P EST HI 40 MIN: CPT | Performed by: REGISTERED NURSE

## 2023-03-16 PROCEDURE — 96361 HYDRATE IV INFUSION ADD-ON: CPT

## 2023-03-16 PROCEDURE — 258N000003 HC RX IP 258 OP 636: Performed by: PEDIATRICS

## 2023-03-16 PROCEDURE — 80053 COMPREHEN METABOLIC PANEL: CPT

## 2023-03-16 PROCEDURE — 76856 US EXAM PELVIC COMPLETE: CPT | Performed by: RADIOLOGY

## 2023-03-16 PROCEDURE — 96374 THER/PROPH/DIAG INJ IV PUSH: CPT

## 2023-03-16 PROCEDURE — 76830 TRANSVAGINAL US NON-OB: CPT | Performed by: RADIOLOGY

## 2023-03-16 RX ORDER — DIPHENHYDRAMINE HCL 25 MG
25 CAPSULE ORAL
Status: CANCELLED
Start: 2023-07-01

## 2023-03-16 RX ORDER — ONDANSETRON 8 MG/1
8 TABLET, FILM COATED ORAL
Status: CANCELLED
Start: 2023-07-01

## 2023-03-16 RX ORDER — HEPARIN SODIUM (PORCINE) LOCK FLUSH IV SOLN 100 UNIT/ML 100 UNIT/ML
5 SOLUTION INTRAVENOUS
Status: DISCONTINUED | OUTPATIENT
Start: 2023-03-16 | End: 2023-03-16 | Stop reason: HOSPADM

## 2023-03-16 RX ORDER — HEPARIN SODIUM (PORCINE) LOCK FLUSH IV SOLN 100 UNIT/ML 100 UNIT/ML
5 SOLUTION INTRAVENOUS
Status: CANCELLED | OUTPATIENT
Start: 2023-07-01

## 2023-03-16 RX ORDER — HEPARIN SODIUM,PORCINE 10 UNIT/ML
5 VIAL (ML) INTRAVENOUS
Status: CANCELLED | OUTPATIENT
Start: 2023-07-01

## 2023-03-16 RX ADMIN — HYDROMORPHONE HYDROCHLORIDE 1 MG: 1 INJECTION, SOLUTION INTRAMUSCULAR; INTRAVENOUS; SUBCUTANEOUS at 10:21

## 2023-03-16 RX ADMIN — SODIUM CHLORIDE, POTASSIUM CHLORIDE, SODIUM LACTATE AND CALCIUM CHLORIDE 1000 ML: 600; 310; 30; 20 INJECTION, SOLUTION INTRAVENOUS at 08:14

## 2023-03-16 RX ADMIN — HYDROMORPHONE HYDROCHLORIDE 1 MG: 1 INJECTION, SOLUTION INTRAMUSCULAR; INTRAVENOUS; SUBCUTANEOUS at 09:22

## 2023-03-16 RX ADMIN — HYDROMORPHONE HYDROCHLORIDE 1 MG: 1 INJECTION, SOLUTION INTRAMUSCULAR; INTRAVENOUS; SUBCUTANEOUS at 08:14

## 2023-03-16 ASSESSMENT — PAIN SCALES - GENERAL: PAINLEVEL: WORST PAIN (10)

## 2023-03-16 NOTE — NURSING NOTE
"Oncology Rooming Note    March 16, 2023 6:57 AM   Jennifer Cervantes is a 23 year old female who presents for:    Chief Complaint   Patient presents with     Oncology Clinic Visit     RTN for Sickle Cell      Initial Vitals: Blood Pressure 107/71   Pulse 94   Temperature 97.8  F (36.6  C) (Oral)   Respiration 16   Weight 68.1 kg (150 lb 3.2 oz)   Last Menstrual Period 12/01/2019 (Approximate)   Oxygen Saturation 94%   Body Mass Index 25.77 kg/m   Estimated body mass index is 25.77 kg/m  as calculated from the following:    Height as of 3/6/23: 1.626 m (5' 4.02\").    Weight as of this encounter: 68.1 kg (150 lb 3.2 oz). Body surface area is 1.75 meters squared.  Worst Pain (10) Comment: Data Unavailable   Patient's last menstrual period was 12/01/2019 (approximate).  Allergies reviewed: Yes  Medications reviewed: Yes    Medications: Medication refills not needed today.  Pharmacy name entered into "[employer]": [facility] [place], [place] - [address] [place] [address] [phone]    Clinical concerns: Pt is having all over body pain today.       Lily GONZÁLEZ Steiner            "

## 2023-03-16 NOTE — PROGRESS NOTES
Infusion Nursing Note:  Jennifer Cervantes presents today for IVF/pain medication.    Patient seen by provider today: Yes: Patricia Mantilla NP   present during visit today: Not Applicable.    Note: Patient arrived with PS 10/10. No new concern. Connected back to Deferal pump after infusion.     Instruction given to patient as per provider. Verbalized understanding.     Upon discharge patient had 3 doses of Dilaudid with PS 4/10. Patient knows when and where to call if symptoms persists/worsen.     Intravenous Access:  Implanted Port.    Treatment Conditions:  Lab Results   Component Value Date    HGB 8.7 (L) 02/23/2023    WBC 12.6 (H) 02/23/2023    ANEU 5.8 12/23/2022    ANEUTAUTO 9.0 (H) 02/23/2023     02/23/2023      Lab Results   Component Value Date     02/23/2023    POTASSIUM 3.8 02/23/2023    MAG 2.0 11/11/2021    CR 0.46 (L) 02/23/2023    MICAH 9.1 02/23/2023    BILITOTAL 2.8 (H) 02/23/2023    ALBUMIN 4.5 02/23/2023    ALT 29 02/23/2023    AST  02/23/2023      Comment:      Unsatisfactory specimen - hemolyzed   Specimen is hemolyzed which can falsely elevate AST. Analysis of a non-hemolyzed specimen may result in a lower value.     Results reviewed, labs MET treatment parameters, ok to proceed with treatment.    TORB: 3/16/23/Patricia Mantilla NP/Dione Delarosa RN/ HN 6.5, no intervention needed. Will monitor it for now. Will check it on Monday if she get in to infusion.     Post Infusion Assessment:  Patient tolerated infusion without incident.  Blood return noted pre and post infusion.  Site patent and intact, free from redness, edema or discomfort.  No evidence of extravasations.  Access kept for Desferal home infusion.      Discharge Plan:   Patient declined prescription refills.  Discharge instructions reviewed with: Patient.  Patient and/or family verbalized understanding of discharge instructions and all questions answered.  AVS to patient via [employer].  Patient will return 3/27/23 for next  appointment.   Patient discharged in stable condition accompanied by: self.  Departure Mode: Ambulatory.      GLORIA YANCEY RN

## 2023-03-17 ENCOUNTER — THERAPY VISIT (OUTPATIENT)
Dept: OCCUPATIONAL THERAPY | Facility: CLINIC | Age: 23
End: 2023-03-17
Payer: COMMERCIAL

## 2023-03-17 ENCOUNTER — TELEPHONE (OUTPATIENT)
Dept: ONCOLOGY | Facility: CLINIC | Age: 23
End: 2023-03-17

## 2023-03-17 DIAGNOSIS — I69.351 SPASTIC HEMIPLEGIA OF RIGHT DOMINANT SIDE AS LATE EFFECT OF CEREBRAL INFARCTION (H): ICD-10-CM

## 2023-03-17 DIAGNOSIS — I69.351 HEMIPLEGIA AND HEMIPARESIS FOLLOWING CEREBRAL INFARCTION AFFECTING RIGHT DOMINANT SIDE (H): Primary | ICD-10-CM

## 2023-03-17 DIAGNOSIS — R25.2 SPASTICITY: ICD-10-CM

## 2023-03-17 PROCEDURE — 97763 ORTHC/PROSTC MGMT SBSQ ENC: CPT | Mod: GO

## 2023-03-17 PROCEDURE — 97140 MANUAL THERAPY 1/> REGIONS: CPT | Mod: GO

## 2023-03-17 PROCEDURE — 97110 THERAPEUTIC EXERCISES: CPT | Mod: GO

## 2023-03-17 NOTE — PROGRESS NOTES
Objective Measures for SOAP Note 3/17/23      ROM  Pain Report: - none  + mild    ++ moderate    +++ severe   Wrist 3/3/2023 3/3/2023 3/17/23   AROM (PROM) L R R   Extension 80 -80 (-58) -45 (-32)   Flexion 72 85    RD full negligible    UD full negligible

## 2023-03-17 NOTE — TELEPHONE ENCOUNTER
Prior Authorization Approval    Authorization Effective Date: 2/15/2023  Authorization Expiration Date: 3/16/2024  Medication: Oxycodone 15 mg PA Approved  Approved Dose/Quantity: 30 per 5 DS  Reference #: [id number]   Insurance Company: [employer] - Phone [phone] Fax [phone]  Expected CoPay:       CoPay Card Available:      Foundation Assistance Needed:    Which Pharmacy is filling the prescription (Not needed for infusion/clinic administered): [facility] [address] [phone]  Pharmacy Notified: Yes  Patient Notified: Yes          Thank you,    Carole Ritchie  Oncology Pharmacy Liaison II  [email or web address]  Phone: [phone]  Fax: [phone]

## 2023-03-17 NOTE — TELEPHONE ENCOUNTER
PA Initiation    Medication: Oxycodone 15 mg PA  Insurance Company: [employer] - Phone [phone] Fax [phone]  Pharmacy Filling the Rx: [facility] [place], [place] - [address] [place] [address] [phone]  Filling Pharmacy Phone:    Filling Pharmacy Fax:    Start Date: 3/17/2023    [id number]    Thank you,    Niko Ritchie  Oncology Pharmacy Liaison II  [email or web address]  Phone: [phone]  Fax: [phone]

## 2023-03-20 ENCOUNTER — INFUSION THERAPY VISIT (OUTPATIENT)
Dept: ONCOLOGY | Facility: CLINIC | Age: 23
End: 2023-03-20
Payer: COMMERCIAL

## 2023-03-20 ENCOUNTER — ALLIED HEALTH/NURSE VISIT (OUTPATIENT)
Dept: INTERNAL MEDICINE | Facility: CLINIC | Age: 23
End: 2023-03-20
Payer: COMMERCIAL

## 2023-03-20 ENCOUNTER — NURSE TRIAGE (OUTPATIENT)
Dept: ONCOLOGY | Facility: CLINIC | Age: 23
End: 2023-03-20

## 2023-03-20 VITALS
RESPIRATION RATE: 16 BRPM | DIASTOLIC BLOOD PRESSURE: 75 MMHG | HEART RATE: 93 BPM | OXYGEN SATURATION: 94 % | SYSTOLIC BLOOD PRESSURE: 125 MMHG | TEMPERATURE: 98.2 F

## 2023-03-20 DIAGNOSIS — Z23 NEED FOR VACCINATION: Primary | ICD-10-CM

## 2023-03-20 DIAGNOSIS — D57.1 HB-SS DISEASE WITHOUT CRISIS (H): Primary | ICD-10-CM

## 2023-03-20 DIAGNOSIS — G81.10 SPASTIC HEMIPLEGIA, UNSPECIFIED ETIOLOGY, UNSPECIFIED LATERALITY (H): ICD-10-CM

## 2023-03-20 DIAGNOSIS — D57.00 SICKLE CELL PAIN CRISIS (H): ICD-10-CM

## 2023-03-20 LAB
ALBUMIN SERPL BCG-MCNC: 4.8 G/DL (ref 3.5–5.2)
ALP SERPL-CCNC: 96 U/L (ref 35–104)
ALT SERPL W P-5'-P-CCNC: 32 U/L (ref 10–35)
ANION GAP SERPL CALCULATED.3IONS-SCNC: 13 MMOL/L (ref 7–15)
AST SERPL W P-5'-P-CCNC: 46 U/L (ref 10–35)
BASOPHILS # BLD AUTO: 0.2 10E3/UL (ref 0–0.2)
BASOPHILS NFR BLD AUTO: 2 %
BILIRUB SERPL-MCNC: 3.8 MG/DL
BUN SERPL-MCNC: 8.8 MG/DL (ref 6–20)
CALCIUM SERPL-MCNC: 9.2 MG/DL (ref 8.6–10)
CHLORIDE SERPL-SCNC: 104 MMOL/L (ref 98–107)
CREAT SERPL-MCNC: 0.54 MG/DL (ref 0.51–0.95)
DEPRECATED HCO3 PLAS-SCNC: 20 MMOL/L (ref 22–29)
EOSINOPHIL # BLD AUTO: 0.4 10E3/UL (ref 0–0.7)
EOSINOPHIL NFR BLD AUTO: 3 %
ERYTHROCYTE [DISTWIDTH] IN BLOOD BY AUTOMATED COUNT: 25.1 % (ref 10–15)
GFR SERPL CREATININE-BSD FRML MDRD: >90 ML/MIN/1.73M2
GLUCOSE SERPL-MCNC: 84 MG/DL (ref 70–99)
HCT VFR BLD AUTO: 22.9 % (ref 35–47)
HGB BLD-MCNC: 8 G/DL (ref 11.7–15.7)
IMM GRANULOCYTES # BLD: 0.1 10E3/UL
IMM GRANULOCYTES NFR BLD: 1 %
LYMPHOCYTES # BLD AUTO: 2.2 10E3/UL (ref 0.8–5.3)
LYMPHOCYTES NFR BLD AUTO: 17 %
MCH RBC QN AUTO: 29.7 PG (ref 26.5–33)
MCHC RBC AUTO-ENTMCNC: 34.9 G/DL (ref 31.5–36.5)
MCV RBC AUTO: 85 FL (ref 78–100)
MONOCYTES # BLD AUTO: 0.9 10E3/UL (ref 0–1.3)
MONOCYTES NFR BLD AUTO: 7 %
NEUTROPHILS # BLD AUTO: 8.9 10E3/UL (ref 1.6–8.3)
NEUTROPHILS NFR BLD AUTO: 70 %
NRBC # BLD AUTO: 0.5 10E3/UL
NRBC BLD AUTO-RTO: 4 /100
PLATELET # BLD AUTO: 468 10E3/UL (ref 150–450)
POTASSIUM SERPL-SCNC: 3.7 MMOL/L (ref 3.4–5.3)
PROT SERPL-MCNC: 8.1 G/DL (ref 6.4–8.3)
RBC # BLD AUTO: 2.69 10E6/UL (ref 3.8–5.2)
RETICS # AUTO: 0.54 10E6/UL (ref 0.03–0.1)
RETICS/RBC NFR AUTO: 20.8 % (ref 0.5–2)
SODIUM SERPL-SCNC: 137 MMOL/L (ref 136–145)
WBC # BLD AUTO: 12.7 10E3/UL (ref 4–11)

## 2023-03-20 PROCEDURE — 85045 AUTOMATED RETICULOCYTE COUNT: CPT

## 2023-03-20 PROCEDURE — 80053 COMPREHEN METABOLIC PANEL: CPT

## 2023-03-20 PROCEDURE — 250N000011 HC RX IP 250 OP 636: Performed by: PEDIATRICS

## 2023-03-20 PROCEDURE — 250N000013 HC RX MED GY IP 250 OP 250 PS 637: Performed by: PEDIATRICS

## 2023-03-20 PROCEDURE — 258N000003 HC RX IP 258 OP 636: Performed by: PEDIATRICS

## 2023-03-20 PROCEDURE — 96374 THER/PROPH/DIAG INJ IV PUSH: CPT

## 2023-03-20 PROCEDURE — 96361 HYDRATE IV INFUSION ADD-ON: CPT

## 2023-03-20 PROCEDURE — 36591 DRAW BLOOD OFF VENOUS DEVICE: CPT

## 2023-03-20 PROCEDURE — 85014 HEMATOCRIT: CPT

## 2023-03-20 PROCEDURE — 96372 THER/PROPH/DIAG INJ SC/IM: CPT

## 2023-03-20 PROCEDURE — 96376 TX/PRO/DX INJ SAME DRUG ADON: CPT

## 2023-03-20 PROCEDURE — 99207 PR NO CHARGE NURSE ONLY: CPT

## 2023-03-20 RX ORDER — HEPARIN SODIUM (PORCINE) LOCK FLUSH IV SOLN 100 UNIT/ML 100 UNIT/ML
5 SOLUTION INTRAVENOUS
Status: DISCONTINUED | OUTPATIENT
Start: 2023-03-20 | End: 2023-03-20 | Stop reason: HOSPADM

## 2023-03-20 RX ORDER — DIPHENHYDRAMINE HCL 25 MG
25 CAPSULE ORAL
Status: CANCELLED
Start: 2023-07-01

## 2023-03-20 RX ORDER — HEPARIN SODIUM (PORCINE) LOCK FLUSH IV SOLN 100 UNIT/ML 100 UNIT/ML
5 SOLUTION INTRAVENOUS
Status: CANCELLED | OUTPATIENT
Start: 2023-07-01

## 2023-03-20 RX ORDER — DIPHENHYDRAMINE HCL 25 MG
25 CAPSULE ORAL
Status: COMPLETED | OUTPATIENT
Start: 2023-03-20 | End: 2023-03-20

## 2023-03-20 RX ORDER — ONDANSETRON 8 MG/1
8 TABLET, FILM COATED ORAL
Status: CANCELLED
Start: 2023-07-01

## 2023-03-20 RX ORDER — OXYCODONE HYDROCHLORIDE 15 MG/1
15 TABLET ORAL EVERY 4 HOURS PRN
Qty: 30 TABLET | Refills: 0 | Status: SHIPPED | OUTPATIENT
Start: 2023-03-20 | End: 2023-03-27

## 2023-03-20 RX ORDER — HEPARIN SODIUM,PORCINE 10 UNIT/ML
5 VIAL (ML) INTRAVENOUS
Status: CANCELLED | OUTPATIENT
Start: 2023-07-01

## 2023-03-20 RX ADMIN — DIPHENHYDRAMINE HYDROCHLORIDE 25 MG: 25 CAPSULE ORAL at 10:46

## 2023-03-20 RX ADMIN — HYDROMORPHONE HYDROCHLORIDE 1 MG: 1 INJECTION, SOLUTION INTRAMUSCULAR; INTRAVENOUS; SUBCUTANEOUS at 12:19

## 2023-03-20 RX ADMIN — MEDROXYPROGESTERONE ACETATE 150 MG: 150 INJECTION, SUSPENSION INTRAMUSCULAR at 09:27

## 2023-03-20 RX ADMIN — HYDROMORPHONE HYDROCHLORIDE 1 MG: 1 INJECTION, SOLUTION INTRAMUSCULAR; INTRAVENOUS; SUBCUTANEOUS at 10:19

## 2023-03-20 RX ADMIN — Medication 5 ML: at 12:42

## 2023-03-20 RX ADMIN — HYDROMORPHONE HYDROCHLORIDE 1 MG: 1 INJECTION, SOLUTION INTRAMUSCULAR; INTRAVENOUS; SUBCUTANEOUS at 11:19

## 2023-03-20 RX ADMIN — SODIUM CHLORIDE, POTASSIUM CHLORIDE, SODIUM LACTATE AND CALCIUM CHLORIDE 1000 ML: 600; 310; 30; 20 INJECTION, SOLUTION INTRAVENOUS at 10:17

## 2023-03-20 NOTE — TELEPHONE ENCOUNTER
Narcotic Refill Request    Medication(s) requested:  Oxycodone   Person Requesting Refill: Jennifer   What pain is the medication treating: sickle Cell pain   How is the medication being taken?:takes 15 mg every 4 hours   Does pt have enough for today? Is out today   Is pain being adequately controlled on the current regimen?: yes   Experiencing any side effects from medication?: none     Date of most recent appointment:  3/16/23 Patricia Mantilla   Any No Show Visits: no   Next appointment:   3/27/23 Dr Duncan   Last fill date and by whom:  3/13/23 Patricia Mantilla    Reviewed:  No access     Routed  provider: Patricia Mantilla

## 2023-03-20 NOTE — PATIENT INSTRUCTIONS
Contact Numbers  [facility]: [phone] (for symptom and scheduling needs)    Please call the [facility] Triage line if you experience a temperature greater than or equal to 100.4, shaking chills, have uncontrolled nausea, vomiting and/or diarrhea, dizziness, shortness of breath, chest pain, bleeding, unexplained bruising, or if you have any other new/concerning symptoms, questions or concerns.     If you are having any concerning symptoms or wish to speak to a provider before your next infusion visit, please call your care coordinator or triage to notify them so we can adequately serve you.     If you need a refill on a narcotic prescription or other medication, please call triage before your infusion appointment.           March 2023 Sunday Monday Tuesday Wednesday Thursday Friday Saturday                  1     2    ONC INFUSION 2 HR (120 MIN)   1:30 PM   (120 min.)    ONC INFUSION NURSE   [facility] 3    HAND THERAPY EVAL   9:15 AM   (60 min.)   Ally Longoria OTR   [facility] 4  Happy Birthday!       5     6    UMP RETURN  10:45 AM   (30 min.)   Michael Dee MD   [facility] [place]    SPEC INFUSION 2 HR (120 MIN)   1:00 PM   (120 min.)   DENISE SIPC INFUSION NURSE   [facility] [place] 7     8     9    INFUSION 3 HR (180 MIN)   1:00 PM   (180 min.)   UR CH 03   [facility] 10    RETURN CCSL   2:00 PM   (45 min.)   Patricia Mantilla CNP   [facility] 11       12     13    BMT INFUSION 2 HR (120 MIN)  11:00 AM   (120 min.)   DENISE BMT INFUSION NURSE   [facility] [place] 14     15     16    RETURN ACTIVE TREATMENT   6:45 AM   (45 min.)   Patricia Mantilla CNP   [facility]    ONC INFUSION 2 HR (120 MIN)   7:30 AM   (120 min.)   DENISE  ONC INFUSION NURSE   [facility]    XR CHEST 2 VIEWS   7:30 AM   (20 min.)   [id number]   [facility] Xray [place]    US PELVIC TRANSABD & TRANSVAG  11:45 AM   (50 min.)   [email or web address]   [facility] 17    HAND THERAPY TREATMENT   9:00 AM   (60 min.)   Ally Longoria OTR   [facility] 18       19     20    UMP NURSE VISIT   9:00 AM   (30 min.)   Nurse, [facility] [place]    ONC INFUSION 2 HR (120 MIN)   9:30 AM   (120 min.)    ONC INFUSION NURSE   [facility] 21    ADULT PSYCHOTHERAPY NEW   5:30 PM   (60 min.)   Martina Limon LICSW   [facility] [place] 22     23     24     25       26     27    LAB CENTRAL  11:30 AM   (15 min.)   [facility] LAB DRAW   [facility]    RETURN CCSL  11:45 AM   (30 min.)   Eric Duncan MD   [facility] 28     29    UMP RETURN   9:45 AM   (60 min.)   Suraj Case MD   [facility] Internal [facility] [place] 30     31 April 2023 Sunday Monday Tuesday Wednesday Thursday Friday Saturday                                 1       2     3     4     5     6     7     8       9     10     11     12     13    HAND THERAPY TREATMENT   8:00 AM   (60 min.)   Ally Longoria OTR   [facility] 14    LAB CENTRAL  11:45 AM   (15 min.)   [facility] LAB DRAW   [facility]    RETURN CCSL  12:00 PM   (45 min.)   Patricia Mantilla CNP   [facility] 15       16     17     18     19     20     21     22       23     24    HAND THERAPY TREATMENT   8:30 AM   (60 min.)   Ally Longoria OTR   [facility]  [place] 25     26     27    NEUROTOXIN   8:45 AM   (60 min.)   Katherine Pierce MD   [facility] 28     29       30                                                     Lab Results:  Recent Results (from the past 12 hour(s))   Comprehensive metabolic panel    Collection Time: 03/20/23 10:10 AM   Result Value Ref Range    Sodium 137 136 - 145 mmol/L    Potassium 3.7 3.4 - 5.3 mmol/L    Chloride 104 98 - 107 mmol/L    Carbon Dioxide (CO2) 20 (L) 22 - 29 mmol/L    Anion Gap 13 7 - 15 mmol/L    Urea Nitrogen 8.8 6.0 - 20.0 mg/dL    Creatinine 0.54 0.51 - 0.95 mg/dL    Calcium 9.2 8.6 - 10.0 mg/dL    Glucose 84 70 - 99 mg/dL    Alkaline Phosphatase 96 35 - 104 U/L    AST 46 (H) 10 - 35 U/L    ALT 32 10 - 35 U/L    Protein Total 8.1 6.4 - 8.3 g/dL    Albumin 4.8 3.5 - 5.2 g/dL    Bilirubin Total 3.8 (H) <=1.2 mg/dL    GFR Estimate >90 >60 mL/min/1.73m2   Reticulocyte count    Collection Time: 03/20/23 10:10 AM   Result Value Ref Range    % Reticulocyte 20.8 (H) 0.5 - 2.0 %    Absolute Reticulocyte 0.543 (H) 0.025 - 0.095 10e6/uL   CBC with platelets and differential    Collection Time: 03/20/23 10:10 AM   Result Value Ref Range    WBC Count 12.7 (H) 4.0 - 11.0 10e3/uL    RBC Count 2.69 (L) 3.80 - 5.20 10e6/uL    Hemoglobin 8.0 (L) 11.7 - 15.7 g/dL    Hematocrit 22.9 (L) 35.0 - 47.0 %    MCV 85 78 - 100 fL    MCH 29.7 26.5 - 33.0 pg    MCHC 34.9 31.5 - 36.5 g/dL    RDW 25.1 (H) 10.0 - 15.0 %    Platelet Count 468 (H) 150 - 450 10e3/uL    % Neutrophils 70 %    % Lymphocytes 17 %    % Monocytes 7 %    % Eosinophils 3 %    % Basophils 2 %    % Immature Granulocytes 1 %    NRBCs per 100 WBC 4 (H) <1 /100    Absolute Neutrophils 8.9 (H) 1.6 - 8.3 10e3/uL    Absolute Lymphocytes 2.2 0.8 - 5.3 10e3/uL    Absolute Monocytes 0.9 0.0 - 1.3 10e3/uL    Absolute Eosinophils 0.4 0.0 - 0.7 10e3/uL    Absolute Basophils 0.2 0.0 - 0.2 10e3/uL    Absolute Immature Granulocytes 0.1 <=0.4 10e3/uL    Absolute  NRBCs 0.5 10e3/uL

## 2023-03-20 NOTE — PROGRESS NOTES
Infusion Nursing Note:  Jennifer Cervantes presents today for IV Fluids and Pain Medications.   Patient seen by provider today: No   present during visit today: Not Applicable.    Note: Patient presents to infusion today reporting 9/10 sickle cell pain in her left side. Denies any fevers, chills, SOB, chest pains or cough. Offers no new symptoms or concerns today.    1L IV fluids, benadryl and 3 doses of IV dilaudid given. Patient reported pain 5/10 at time of discharge. Stated she felt comfortable discharging home at this time.    CBC, CMP, and reticulocyte count labs drawn in infusion today per Patricia Mantilla NP as Hgb last week low at 6.5.     Intravenous Access:  Implanted Port.    Treatment Conditions:  Lab Results   Component Value Date    HGB 8.0 (L) 03/20/2023    WBC 12.7 (H) 03/20/2023    ANEU 5.8 12/23/2022    ANEUTAUTO 8.9 (H) 03/20/2023     (H) 03/20/2023      Lab Results   Component Value Date     03/20/2023    POTASSIUM 3.7 03/20/2023    MAG 2.0 11/11/2021    CR 0.54 03/20/2023    MICAH 9.2 03/20/2023    BILITOTAL 3.8 (H) 03/20/2023    ALBUMIN 4.8 03/20/2023    ALT 32 03/20/2023    AST 46 (H) 03/20/2023     Results reviewed, labs MET treatment parameters, ok to proceed with treatment.    Post Infusion Assessment:  Patient tolerated infusion without incident.  Blood return noted pre and post infusion.  Site patent and intact, free from redness, edema or discomfort.  No evidence of extravasations.  Access discontinued per protocol.     Discharge Plan:   Prescription refills given for Oxycodone.  Discharge instructions reviewed with: Patient.  Patient and/or family verbalized understanding of discharge instructions and all questions answered.  AVS to patient via [employer].  Patient will return 3/27 for next appointment with Dr. Duncan.   Patient discharged in stable condition accompanied by: self.  Departure Mode: Ambulatory.      Maritza Bautista RN

## 2023-03-20 NOTE — TELEPHONE ENCOUNTER
Jennifer (pt) wondering if any appts available for IVF/pain as in clinic already for a different appt and if can get in would like to prevent having to arrange transportation for two round trips to clinic today.     0910 Spoke to [facility] infusion charge, able to get pt in within the hour, pt will have to wait. Jennifer in agreement with plan.

## 2023-03-20 NOTE — TELEPHONE ENCOUNTER
Pt called in to triage requesting IVF pain meds for left  side  pain rated 9/10 x 2 days.     Stated last took prn oxycodone at 6am this morning without relief.     Denied any fevers, chills, cough, sob, chest pain, numbness or tingling or other symptoms not typical of sickle cell pain.     Pt's last infusion was 3/16/23, last clinic visit 3/16/23 with follow-up on 3/27/23 with DR Duncan .     Patient states they do have own ride and they will be at the [facility] at 9am this morning for an appt. Will be done with appt at 10am. Will need transportation if no call back while at clinic and surgery center.      Pt denied being out of home medications and needing any refills today.     Pt qualifies for sickle cell standing order protocol.    If you do not hear from the infusion center by 2pm then you will not be able to get in for an infusion today.

## 2023-03-20 NOTE — PROGRESS NOTES
Jennifer Cervantes received the DEPO-Provera today in clinic at the request of Dr. Case. The immunization was given under the supervision of Dr. Woodruff if assistance was needed. The patient does not report a history of adverse reactions associated with vaccine administration. The immunization site was cleaned with an alcohol prep wipe. The immunization was given without incident--see immunization list for administration details. No swelling or redness was observed at the site of injection after the immunization was given. The patient was advised to remain in fourth floor lobby of the Clinics [facility] Center for fifteen minutes after the injection in case of an adverse reaction. Patient received injection in the right arm, next visit it should be given in the left arm.     Marni Ca, EMT  9:28 AM 3/20/2023

## 2023-03-23 ENCOUNTER — INFUSION THERAPY VISIT (OUTPATIENT)
Dept: ONCOLOGY | Facility: CLINIC | Age: 23
End: 2023-03-23
Attending: PEDIATRICS
Payer: COMMERCIAL

## 2023-03-23 ENCOUNTER — PATIENT OUTREACH (OUTPATIENT)
Dept: CARE COORDINATION | Facility: CLINIC | Age: 23
End: 2023-03-23
Payer: COMMERCIAL

## 2023-03-23 ENCOUNTER — NURSE TRIAGE (OUTPATIENT)
Dept: ONCOLOGY | Facility: CLINIC | Age: 23
End: 2023-03-23
Payer: COMMERCIAL

## 2023-03-23 VITALS
SYSTOLIC BLOOD PRESSURE: 128 MMHG | RESPIRATION RATE: 16 BRPM | TEMPERATURE: 98.2 F | DIASTOLIC BLOOD PRESSURE: 74 MMHG | HEART RATE: 103 BPM | OXYGEN SATURATION: 94 %

## 2023-03-23 DIAGNOSIS — D57.00 SICKLE CELL PAIN CRISIS (H): ICD-10-CM

## 2023-03-23 DIAGNOSIS — G81.10 SPASTIC HEMIPLEGIA, UNSPECIFIED ETIOLOGY, UNSPECIFIED LATERALITY (H): Primary | ICD-10-CM

## 2023-03-23 PROCEDURE — 96361 HYDRATE IV INFUSION ADD-ON: CPT

## 2023-03-23 PROCEDURE — 96376 TX/PRO/DX INJ SAME DRUG ADON: CPT

## 2023-03-23 PROCEDURE — 258N000003 HC RX IP 258 OP 636: Performed by: PEDIATRICS

## 2023-03-23 PROCEDURE — 250N000013 HC RX MED GY IP 250 OP 250 PS 637: Performed by: PEDIATRICS

## 2023-03-23 PROCEDURE — 250N000011 HC RX IP 250 OP 636: Performed by: PEDIATRICS

## 2023-03-23 PROCEDURE — 96374 THER/PROPH/DIAG INJ IV PUSH: CPT

## 2023-03-23 RX ORDER — DIPHENHYDRAMINE HCL 25 MG
25 CAPSULE ORAL
Status: CANCELLED
Start: 2023-07-01

## 2023-03-23 RX ORDER — ONDANSETRON 4 MG/1
8 TABLET, ORALLY DISINTEGRATING ORAL
Status: DISCONTINUED | OUTPATIENT
Start: 2023-03-23 | End: 2023-03-23 | Stop reason: HOSPADM

## 2023-03-23 RX ORDER — ONDANSETRON 4 MG/1
8 TABLET, FILM COATED ORAL
Status: CANCELLED
Start: 2023-07-01

## 2023-03-23 RX ORDER — HEPARIN SODIUM (PORCINE) LOCK FLUSH IV SOLN 100 UNIT/ML 100 UNIT/ML
500 SOLUTION INTRAVENOUS ONCE
Status: COMPLETED | OUTPATIENT
Start: 2023-03-23 | End: 2023-03-23

## 2023-03-23 RX ORDER — HEPARIN SODIUM,PORCINE 10 UNIT/ML
5 VIAL (ML) INTRAVENOUS
Status: CANCELLED | OUTPATIENT
Start: 2023-07-01

## 2023-03-23 RX ORDER — DIPHENHYDRAMINE HCL 25 MG
25 CAPSULE ORAL
Status: COMPLETED | OUTPATIENT
Start: 2023-03-23 | End: 2023-03-23

## 2023-03-23 RX ORDER — HEPARIN SODIUM (PORCINE) LOCK FLUSH IV SOLN 100 UNIT/ML 100 UNIT/ML
5 SOLUTION INTRAVENOUS
Status: CANCELLED | OUTPATIENT
Start: 2023-07-01

## 2023-03-23 RX ORDER — ONDANSETRON 4 MG/1
8 TABLET, ORALLY DISINTEGRATING ORAL
Status: DISCONTINUED | OUTPATIENT
Start: 2023-03-23 | End: 2023-03-23

## 2023-03-23 RX ADMIN — HYDROMORPHONE HYDROCHLORIDE 1 MG: 1 INJECTION, SOLUTION INTRAMUSCULAR; INTRAVENOUS; SUBCUTANEOUS at 12:01

## 2023-03-23 RX ADMIN — HYDROMORPHONE HYDROCHLORIDE 1 MG: 1 INJECTION, SOLUTION INTRAMUSCULAR; INTRAVENOUS; SUBCUTANEOUS at 13:20

## 2023-03-23 RX ADMIN — HYDROMORPHONE HYDROCHLORIDE 1 MG: 1 INJECTION, SOLUTION INTRAMUSCULAR; INTRAVENOUS; SUBCUTANEOUS at 10:50

## 2023-03-23 RX ADMIN — DIPHENHYDRAMINE HYDROCHLORIDE 25 MG: 25 CAPSULE ORAL at 10:52

## 2023-03-23 RX ADMIN — SODIUM CHLORIDE, POTASSIUM CHLORIDE, SODIUM LACTATE AND CALCIUM CHLORIDE 1000 ML: 600; 310; 30; 20 INJECTION, SOLUTION INTRAVENOUS at 10:49

## 2023-03-23 RX ADMIN — Medication 500 UNITS: at 13:34

## 2023-03-23 ASSESSMENT — PAIN SCALES - GENERAL: PAINLEVEL: EXTREME PAIN (9)

## 2023-03-23 NOTE — PROGRESS NOTES
Infusion Nursing Note:  Jennifer Cervantes presents today for IVF, IV pain meds.    Patient seen by provider today: No   present during visit today: Not Applicable.    Note: Jennifer being seen today as an add on through triage.  Reports pain currently rated at a 9 in her low back and left side.  These areas of pain are not new since her last admission.  She took Oxycodone this morning.  See doc flow sheet for remainder of assessment.    Dilaudid 1mg IV given x3 doses with pain down to a 4 at time of discharge which is acceptable for Jennifer.    Intravenous Access:  Implanted Port.    Treatment Conditions:  Not Applicable.    Post Infusion Assessment:  Patient tolerated infusion without incident.  Blood return noted pre and post infusion.  Site patent and intact, free from redness, edema or discomfort.  No evidence of extravasations.  Access discontinued per protocol.     Discharge Plan:   Patient declined prescription refills.  AVS to patient via [employer].  Patient will return 3/27/23 labs/Dr. Duncan for next appointment.   Patient discharged in stable condition accompanied by: self.  Departure Mode: Ambulatory.  Face to Face time: 0.      Monika Lazo RN

## 2023-03-23 NOTE — PROGRESS NOTES
.Social Work Note: Transportation  Oncology Clinic     Data/Intervention:  Patient Name:  Jennifer Cervantes  /Age: 2000, 23 Years old     Call From: [facility]        Reason for Call:  Transportation     Assessment:   called [employer] to arrange ride through patient's insurance. [employer] arranged  for patient from home with [employer].  Patient will need to call when ready for return ride home ([phone]).      Plan:  Patient is aware of the transportation plan.  available to assist with any other needs.      CARLOS Chavez,LGSW  Hematology/Oncology Social Worker  Phone:[phone] Pager: [phone]

## 2023-03-23 NOTE — PATIENT INSTRUCTIONS
[facility] Main Line: [phone]    Call triage nurse with chills and/or temperature greater than or equal to 100.4, uncontrolled nausea/vomiting, diarrhea, constipation, dizziness, shortness of breath, chest pain, bleeding, unexplained bruising, or any new/concerning symptoms, questions/concerns.   If you are having any concerning symptoms or wish to speak to a provider before your next infusion visit, please call your care coordinator or triage to notify them so we can adequately serve you.   Triage Nurse Line: [phone]    If after hours, weekends, or holidays [phone]

## 2023-03-23 NOTE — TELEPHONE ENCOUNTER
Pt called in to triage requesting IVF pain meds for L sided, sharp, constant pain rated 9/10 x 24 hours. Stated last took prn oxycodone at 0600 this morning without relief. Denied any fevers, chills, cough, sob, chest pain, numbness or tingling, swelling, or other symptoms not typical of sickle cell pain. The L sided pain has been going on since got out of the hospital.   Pt's last infusion was 3/20, last clinic visit 3/16/23 with follow-up on 3/27/23 with Dr. Duncan.   Patient states need transportation, 20-25 minutes away from clinic  Pt denied being out of home medications and needing any refills today.   Pt qualifies for sickle cell standing order protocol.  If you do not hear from the infusion center by 2pm then you will not be able to get in for an infusion today.     Added to the Infusion Wait list.    [facility] can offer [address]  Jennifer confirmed she can come to the apt.   Updated [facility] Charge Nurse.  Message sent to [facility] to schedule.  Message sent to  to assist with transportation.    Message routed to Patricia Mantilla CNP and Arely Krueger, MAURISIO

## 2023-03-27 ENCOUNTER — APPOINTMENT (OUTPATIENT)
Dept: LAB | Facility: CLINIC | Age: 23
End: 2023-03-27
Attending: PEDIATRICS
Payer: COMMERCIAL

## 2023-03-27 ENCOUNTER — NURSE TRIAGE (OUTPATIENT)
Dept: ONCOLOGY | Facility: CLINIC | Age: 23
End: 2023-03-27
Payer: COMMERCIAL

## 2023-03-27 ENCOUNTER — HOSPITAL ENCOUNTER (EMERGENCY)
Facility: CLINIC | Age: 23
Discharge: HOME | End: 2023-03-28
Attending: EMERGENCY MEDICINE | Admitting: EMERGENCY MEDICINE
Payer: COMMERCIAL

## 2023-03-27 ENCOUNTER — INFUSION THERAPY VISIT (OUTPATIENT)
Dept: INFUSION THERAPY | Facility: CLINIC | Age: 23
End: 2023-03-27
Attending: PEDIATRICS
Payer: COMMERCIAL

## 2023-03-27 ENCOUNTER — ONCOLOGY VISIT (OUTPATIENT)
Dept: ONCOLOGY | Facility: CLINIC | Age: 23
End: 2023-03-27
Attending: PEDIATRICS
Payer: COMMERCIAL

## 2023-03-27 VITALS
BODY MASS INDEX: 26.4 KG/M2 | SYSTOLIC BLOOD PRESSURE: 106 MMHG | RESPIRATION RATE: 16 BRPM | HEART RATE: 105 BPM | DIASTOLIC BLOOD PRESSURE: 56 MMHG | TEMPERATURE: 98.8 F | OXYGEN SATURATION: 94 % | WEIGHT: 153.9 LBS

## 2023-03-27 DIAGNOSIS — D57.00 SICKLE CELL PAIN CRISIS (H): ICD-10-CM

## 2023-03-27 DIAGNOSIS — Z86.711 PERSONAL HISTORY OF PE (PULMONARY EMBOLISM): ICD-10-CM

## 2023-03-27 DIAGNOSIS — D57.1 HB-SS DISEASE WITHOUT CRISIS (H): Primary | ICD-10-CM

## 2023-03-27 DIAGNOSIS — E83.111 IRON OVERLOAD DUE TO REPEATED RED BLOOD CELL TRANSFUSIONS: ICD-10-CM

## 2023-03-27 DIAGNOSIS — M79.621 PAIN OF RIGHT UPPER ARM: ICD-10-CM

## 2023-03-27 DIAGNOSIS — R07.9 CHEST PAIN, UNSPECIFIED TYPE: ICD-10-CM

## 2023-03-27 DIAGNOSIS — G81.10 SPASTIC HEMIPLEGIA, UNSPECIFIED ETIOLOGY, UNSPECIFIED LATERALITY (H): ICD-10-CM

## 2023-03-27 LAB
ALBUMIN SERPL BCG-MCNC: 4.3 G/DL (ref 3.5–5.2)
ALBUMIN SERPL BCG-MCNC: 4.3 G/DL (ref 3.5–5.2)
ALP SERPL-CCNC: 73 U/L (ref 35–104)
ALP SERPL-CCNC: 77 U/L (ref 35–104)
ALT SERPL W P-5'-P-CCNC: 18 U/L (ref 10–35)
ALT SERPL W P-5'-P-CCNC: 20 U/L (ref 10–35)
ANION GAP SERPL CALCULATED.3IONS-SCNC: 12 MMOL/L (ref 7–15)
ANION GAP SERPL CALCULATED.3IONS-SCNC: 13 MMOL/L (ref 7–15)
AST SERPL W P-5'-P-CCNC: 42 U/L (ref 10–35)
AST SERPL W P-5'-P-CCNC: ABNORMAL U/L
BASOPHILS # BLD AUTO: 0.2 10E3/UL (ref 0–0.2)
BASOPHILS # BLD AUTO: 0.2 10E3/UL (ref 0–0.2)
BASOPHILS NFR BLD AUTO: 1 %
BASOPHILS NFR BLD AUTO: 1 %
BILIRUB SERPL-MCNC: 3.3 MG/DL
BILIRUB SERPL-MCNC: 4.5 MG/DL
BUN SERPL-MCNC: 10.4 MG/DL (ref 6–20)
BUN SERPL-MCNC: 8 MG/DL (ref 6–20)
CALCIUM SERPL-MCNC: 8.9 MG/DL (ref 8.6–10)
CALCIUM SERPL-MCNC: 8.9 MG/DL (ref 8.6–10)
CHLORIDE SERPL-SCNC: 107 MMOL/L (ref 98–107)
CHLORIDE SERPL-SCNC: 109 MMOL/L (ref 98–107)
CREAT SERPL-MCNC: 0.5 MG/DL (ref 0.51–0.95)
CREAT SERPL-MCNC: 0.51 MG/DL (ref 0.51–0.95)
DEPRECATED HCO3 PLAS-SCNC: 18 MMOL/L (ref 22–29)
DEPRECATED HCO3 PLAS-SCNC: 19 MMOL/L (ref 22–29)
EOSINOPHIL # BLD AUTO: 0.4 10E3/UL (ref 0–0.7)
EOSINOPHIL # BLD AUTO: 0.4 10E3/UL (ref 0–0.7)
EOSINOPHIL NFR BLD AUTO: 2 %
EOSINOPHIL NFR BLD AUTO: 4 %
ERYTHROCYTE [DISTWIDTH] IN BLOOD BY AUTOMATED COUNT: 25 % (ref 10–15)
ERYTHROCYTE [DISTWIDTH] IN BLOOD BY AUTOMATED COUNT: 25.4 % (ref 10–15)
FERRITIN SERPL-MCNC: 6169 NG/ML (ref 6–175)
GFR SERPL CREATININE-BSD FRML MDRD: >90 ML/MIN/1.73M2
GFR SERPL CREATININE-BSD FRML MDRD: >90 ML/MIN/1.73M2
GLUCOSE SERPL-MCNC: 89 MG/DL (ref 70–99)
GLUCOSE SERPL-MCNC: 93 MG/DL (ref 70–99)
HCG SERPL QL: NEGATIVE
HCT VFR BLD AUTO: 22.1 % (ref 35–47)
HCT VFR BLD AUTO: 22.3 % (ref 35–47)
HGB BLD-MCNC: 7.6 G/DL (ref 11.7–15.7)
HGB BLD-MCNC: 7.9 G/DL (ref 11.7–15.7)
HOLD SPECIMEN: NORMAL
HOLD SPECIMEN: NORMAL
IMM GRANULOCYTES # BLD: 0.1 10E3/UL
IMM GRANULOCYTES # BLD: 0.2 10E3/UL
IMM GRANULOCYTES NFR BLD: 0 %
IMM GRANULOCYTES NFR BLD: 1 %
LYMPHOCYTES # BLD AUTO: 1.9 10E3/UL (ref 0.8–5.3)
LYMPHOCYTES # BLD AUTO: 2.4 10E3/UL (ref 0.8–5.3)
LYMPHOCYTES NFR BLD AUTO: 15 %
LYMPHOCYTES NFR BLD AUTO: 15 %
MCH RBC QN AUTO: 30.5 PG (ref 26.5–33)
MCH RBC QN AUTO: 30.7 PG (ref 26.5–33)
MCHC RBC AUTO-ENTMCNC: 34.4 G/DL (ref 31.5–36.5)
MCHC RBC AUTO-ENTMCNC: 35.4 G/DL (ref 31.5–36.5)
MCV RBC AUTO: 87 FL (ref 78–100)
MCV RBC AUTO: 89 FL (ref 78–100)
MONOCYTES # BLD AUTO: 0.8 10E3/UL (ref 0–1.3)
MONOCYTES # BLD AUTO: 1.2 10E3/UL (ref 0–1.3)
MONOCYTES NFR BLD AUTO: 7 %
MONOCYTES NFR BLD AUTO: 7 %
NEUTROPHILS # BLD AUTO: 12 10E3/UL (ref 1.6–8.3)
NEUTROPHILS # BLD AUTO: 9 10E3/UL (ref 1.6–8.3)
NEUTROPHILS NFR BLD AUTO: 73 %
NEUTROPHILS NFR BLD AUTO: 74 %
NRBC # BLD AUTO: 0.3 10E3/UL
NRBC # BLD AUTO: 0.3 10E3/UL
NRBC BLD AUTO-RTO: 2 /100
NRBC BLD AUTO-RTO: 3 /100
PLATELET # BLD AUTO: 443 10E3/UL (ref 150–450)
PLATELET # BLD AUTO: 475 10E3/UL (ref 150–450)
POTASSIUM SERPL-SCNC: 3.7 MMOL/L (ref 3.4–5.3)
POTASSIUM SERPL-SCNC: 4 MMOL/L (ref 3.4–5.3)
PROT SERPL-MCNC: 7.5 G/DL (ref 6.4–8.3)
PROT SERPL-MCNC: 7.5 G/DL (ref 6.4–8.3)
RBC # BLD AUTO: 2.49 10E6/UL (ref 3.8–5.2)
RBC # BLD AUTO: 2.57 10E6/UL (ref 3.8–5.2)
RETICS # AUTO: 0.5 10E6/UL (ref 0.03–0.1)
RETICS/RBC NFR AUTO: 19.9 % (ref 0.5–2)
SODIUM SERPL-SCNC: 138 MMOL/L (ref 136–145)
SODIUM SERPL-SCNC: 140 MMOL/L (ref 136–145)
TROPONIN T SERPL HS-MCNC: <6 NG/L
WBC # BLD AUTO: 12.3 10E3/UL (ref 4–11)
WBC # BLD AUTO: 16.2 10E3/UL (ref 4–11)

## 2023-03-27 PROCEDURE — 250N000011 HC RX IP 250 OP 636: Performed by: EMERGENCY MEDICINE

## 2023-03-27 PROCEDURE — 250N000009 HC RX 250: Performed by: EMERGENCY MEDICINE

## 2023-03-27 PROCEDURE — 250N000011 HC RX IP 250 OP 636: Performed by: PEDIATRICS

## 2023-03-27 PROCEDURE — 84155 ASSAY OF PROTEIN SERUM: CPT | Performed by: EMERGENCY MEDICINE

## 2023-03-27 PROCEDURE — 84703 CHORIONIC GONADOTROPIN ASSAY: CPT | Performed by: EMERGENCY MEDICINE

## 2023-03-27 PROCEDURE — 96375 TX/PRO/DX INJ NEW DRUG ADDON: CPT | Performed by: EMERGENCY MEDICINE

## 2023-03-27 PROCEDURE — 258N000003 HC RX IP 258 OP 636: Performed by: PEDIATRICS

## 2023-03-27 PROCEDURE — 99285 EMERGENCY DEPT VISIT HI MDM: CPT | Mod: 25 | Performed by: EMERGENCY MEDICINE

## 2023-03-27 PROCEDURE — 84155 ASSAY OF PROTEIN SERUM: CPT

## 2023-03-27 PROCEDURE — 96374 THER/PROPH/DIAG INJ IV PUSH: CPT | Performed by: EMERGENCY MEDICINE

## 2023-03-27 PROCEDURE — 93010 ELECTROCARDIOGRAM REPORT: CPT | Performed by: EMERGENCY MEDICINE

## 2023-03-27 PROCEDURE — 36591 DRAW BLOOD OFF VENOUS DEVICE: CPT | Performed by: EMERGENCY MEDICINE

## 2023-03-27 PROCEDURE — 96374 THER/PROPH/DIAG INJ IV PUSH: CPT

## 2023-03-27 PROCEDURE — 96376 TX/PRO/DX INJ SAME DRUG ADON: CPT

## 2023-03-27 PROCEDURE — 85045 AUTOMATED RETICULOCYTE COUNT: CPT

## 2023-03-27 PROCEDURE — 36591 DRAW BLOOD OFF VENOUS DEVICE: CPT

## 2023-03-27 PROCEDURE — 85025 COMPLETE CBC W/AUTO DIFF WBC: CPT | Performed by: EMERGENCY MEDICINE

## 2023-03-27 PROCEDURE — 250N000013 HC RX MED GY IP 250 OP 250 PS 637: Performed by: PEDIATRICS

## 2023-03-27 PROCEDURE — 96361 HYDRATE IV INFUSION ADD-ON: CPT | Performed by: EMERGENCY MEDICINE

## 2023-03-27 PROCEDURE — 84484 ASSAY OF TROPONIN QUANT: CPT | Performed by: EMERGENCY MEDICINE

## 2023-03-27 PROCEDURE — 250N000013 HC RX MED GY IP 250 OP 250 PS 637: Performed by: EMERGENCY MEDICINE

## 2023-03-27 PROCEDURE — 93005 ELECTROCARDIOGRAM TRACING: CPT | Performed by: EMERGENCY MEDICINE

## 2023-03-27 PROCEDURE — 96361 HYDRATE IV INFUSION ADD-ON: CPT

## 2023-03-27 PROCEDURE — 85025 COMPLETE CBC W/AUTO DIFF WBC: CPT

## 2023-03-27 PROCEDURE — 258N000003 HC RX IP 258 OP 636: Performed by: EMERGENCY MEDICINE

## 2023-03-27 PROCEDURE — 99215 OFFICE O/P EST HI 40 MIN: CPT | Performed by: PEDIATRICS

## 2023-03-27 PROCEDURE — 82728 ASSAY OF FERRITIN: CPT

## 2023-03-27 RX ORDER — ONDANSETRON 8 MG/1
8 TABLET, ORALLY DISINTEGRATING ORAL
Status: DISCONTINUED | OUTPATIENT
Start: 2023-03-27 | End: 2023-03-27 | Stop reason: HOSPADM

## 2023-03-27 RX ORDER — DIPHENHYDRAMINE HCL 25 MG
25 CAPSULE ORAL
Status: CANCELLED
Start: 2023-07-01

## 2023-03-27 RX ORDER — OXYCODONE HYDROCHLORIDE 15 MG/1
15 TABLET ORAL EVERY 4 HOURS PRN
Qty: 30 TABLET | Refills: 0 | Status: SHIPPED | OUTPATIENT
Start: 2023-03-27 | End: 2023-04-03

## 2023-03-27 RX ORDER — HEPARIN SODIUM (PORCINE) LOCK FLUSH IV SOLN 100 UNIT/ML 100 UNIT/ML
5 SOLUTION INTRAVENOUS
Status: CANCELLED | OUTPATIENT
Start: 2023-07-01

## 2023-03-27 RX ORDER — METHYLPREDNISOLONE SODIUM SUCCINATE 125 MG/2ML
40 INJECTION, POWDER, LYOPHILIZED, FOR SOLUTION INTRAMUSCULAR; INTRAVENOUS ONCE
Status: COMPLETED | OUTPATIENT
Start: 2023-03-27 | End: 2023-03-27

## 2023-03-27 RX ORDER — HEPARIN SODIUM,PORCINE 10 UNIT/ML
5 VIAL (ML) INTRAVENOUS
Status: CANCELLED | OUTPATIENT
Start: 2023-07-01

## 2023-03-27 RX ORDER — DIPHENHYDRAMINE HCL 50 MG
50 CAPSULE ORAL EVERY 6 HOURS PRN
Status: DISCONTINUED | OUTPATIENT
Start: 2023-03-27 | End: 2023-03-28 | Stop reason: HOSPADM

## 2023-03-27 RX ORDER — DIPHENHYDRAMINE HCL 25 MG
25 CAPSULE ORAL
Status: COMPLETED | OUTPATIENT
Start: 2023-03-27 | End: 2023-03-27

## 2023-03-27 RX ORDER — DIPHENHYDRAMINE HYDROCHLORIDE 50 MG/ML
25 INJECTION INTRAMUSCULAR; INTRAVENOUS EVERY 6 HOURS PRN
Status: DISCONTINUED | OUTPATIENT
Start: 2023-03-27 | End: 2023-03-28 | Stop reason: HOSPADM

## 2023-03-27 RX ORDER — KETOROLAC TROMETHAMINE 30 MG/ML
30 INJECTION, SOLUTION INTRAMUSCULAR; INTRAVENOUS ONCE
Status: COMPLETED | OUTPATIENT
Start: 2023-03-27 | End: 2023-03-27

## 2023-03-27 RX ORDER — ONDANSETRON 2 MG/ML
8 INJECTION INTRAMUSCULAR; INTRAVENOUS
Status: DISCONTINUED | OUTPATIENT
Start: 2023-03-27 | End: 2023-03-28 | Stop reason: HOSPADM

## 2023-03-27 RX ORDER — OXYCODONE HYDROCHLORIDE 10 MG/1
20 TABLET ORAL ONCE
Status: COMPLETED | OUTPATIENT
Start: 2023-03-27 | End: 2023-03-27

## 2023-03-27 RX ORDER — ONDANSETRON 4 MG/1
8 TABLET, FILM COATED ORAL
Status: CANCELLED
Start: 2023-07-01

## 2023-03-27 RX ORDER — HEPARIN SODIUM (PORCINE) LOCK FLUSH IV SOLN 100 UNIT/ML 100 UNIT/ML
5 SOLUTION INTRAVENOUS
Status: DISCONTINUED | OUTPATIENT
Start: 2023-03-27 | End: 2023-03-27 | Stop reason: HOSPADM

## 2023-03-27 RX ORDER — HEPARIN SODIUM (PORCINE) LOCK FLUSH IV SOLN 100 UNIT/ML 100 UNIT/ML
500 SOLUTION INTRAVENOUS ONCE
Status: COMPLETED | OUTPATIENT
Start: 2023-03-27 | End: 2023-03-27

## 2023-03-27 RX ORDER — EPINEPHRINE 0.3 MG/.3ML
0.3 INJECTION SUBCUTANEOUS
Status: DISCONTINUED | OUTPATIENT
Start: 2023-03-27 | End: 2023-03-27

## 2023-03-27 RX ADMIN — DIPHENHYDRAMINE HYDROCHLORIDE 25 MG: 25 CAPSULE ORAL at 12:10

## 2023-03-27 RX ADMIN — HYDROMORPHONE HYDROCHLORIDE 1 MG: 1 INJECTION, SOLUTION INTRAMUSCULAR; INTRAVENOUS; SUBCUTANEOUS at 13:16

## 2023-03-27 RX ADMIN — SODIUM CHLORIDE 1000 ML: 9 INJECTION, SOLUTION INTRAVENOUS at 21:56

## 2023-03-27 RX ADMIN — HYDROMORPHONE HYDROCHLORIDE 1 MG: 1 INJECTION, SOLUTION INTRAMUSCULAR; INTRAVENOUS; SUBCUTANEOUS at 14:27

## 2023-03-27 RX ADMIN — HYDROMORPHONE HYDROCHLORIDE 1 MG: 1 INJECTION, SOLUTION INTRAMUSCULAR; INTRAVENOUS; SUBCUTANEOUS at 12:15

## 2023-03-27 RX ADMIN — METHYLPREDNISOLONE SODIUM SUCCINATE 37.5 MG: 125 INJECTION, POWDER, FOR SOLUTION INTRAMUSCULAR; INTRAVENOUS at 21:47

## 2023-03-27 RX ADMIN — Medication 4 MG: at 22:36

## 2023-03-27 RX ADMIN — Medication 500 UNITS: at 11:42

## 2023-03-27 RX ADMIN — Medication 5 ML: at 15:06

## 2023-03-27 RX ADMIN — SODIUM CHLORIDE, POTASSIUM CHLORIDE, SODIUM LACTATE AND CALCIUM CHLORIDE 1000 ML: 600; 310; 30; 20 INJECTION, SOLUTION INTRAVENOUS at 12:13

## 2023-03-27 RX ADMIN — KETOROLAC TROMETHAMINE 30 MG: 30 INJECTION, SOLUTION INTRAMUSCULAR at 22:36

## 2023-03-27 RX ADMIN — OXYCODONE HYDROCHLORIDE 20 MG: 10 TABLET ORAL at 22:35

## 2023-03-27 RX ADMIN — HYDROMORPHONE HYDROCHLORIDE 1 MG: 1 INJECTION, SOLUTION INTRAMUSCULAR; INTRAVENOUS; SUBCUTANEOUS at 21:45

## 2023-03-27 ASSESSMENT — PAIN SCALES - GENERAL: PAINLEVEL: WORST PAIN (10)

## 2023-03-27 ASSESSMENT — ACTIVITIES OF DAILY LIVING (ADL): ADLS_ACUITY_SCORE: 35

## 2023-03-27 NOTE — PROGRESS NOTES
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 She had multiple thromboembolic events in 2021 despite adherent anticoagulation use (though warfarin was perpetually low) and there are concerns for chronic thromboembolic disease but did not have pulmonary HTN on a November 2021 cath. She is maintained on chronic PO opioids and twice-weekly infusion visits (since 1/24/22) but has been able to be maintained on this regimen and has stayed out of the ED most of the time with even rarer admissions.     Interval History:  Jennifer is seen back today for short interval follow-up after a requested follow up with RONALDO Mantilla two weeks ago. She is feeling well overall today. No cough or SOB. No fever. She does not have any sick contacts. She is feeling good because she has some leads on [employer] for disabled individuals (she said she has been on the wait list for 6-7 years) and is doing some job interviews too. She has a bit of pain today but describes it as her normal pain. She has done well with the recent decrease in weekly oxycodone Rx. She does feel like the home weekend Desferal is taking a toll on her quality of life on the weekends it is running, and she has had concerns about the needle coming out. She has tolerated it well but is wanting a break. She isn't sure about two oral chelators right now so she wanted to just do the Jadenu. She is excited for the warmer weather coming.    Sickle Cell Disease Comprehensive Checklist    Bone  "Health/Avascular Necrosis Screening/Symptoms (each visit): no new concerns today    Leg Ulcer evaluation (every visit): none    Hypertension (every visit):stable 3/27/23    Last pulmonary evaluation (asthma, AMAN, pulm HTN): 9/28/22    Stroke/silent cerebral infarct Hx (Y/N): Yes TIA ~2014, first event ~age 1 with full stroke and R sided weakness    Last PCP Visit: 3/6/23    Vaccines:  ? PCV13: 5/13/19  ? Pneumovax (PPSV23): 3/04, 10/09, 7/12/19 (next due 7/2024)  ? Menactra: 4/2010, 9/2015, 8/16/21 (due 2026)  ? Influenza: 11/17/2022     Audiology (chelation): done 6/2020, normal.. However, on 6/7, \"While there is no significant change in grade on the CTCAE 4.03 Scale, there were hearing changes bilaterally for both standard and extended high frequency audiometry.  Will need to determine if an ENT consult is advised due to the asymmetry in extended high frequency testing.\"     Plan last reviewed with patient: 7/11/22    Patient background: 21 yo F, enjoys movies and kids though there are times where she does not really want to talk to people. Does not have a lot of social support at home.     Sickle Cell Disease History  Primary Hematologist Team: Jose Rafael Duncan  PCP: none  Genotype: SS  Acute Pain Crisis Treatment: (avoiding IV opioids for now, which she has agreed to)    ER   o Oxycodone 15-20 mg x1  o Ketamine 4mg IV x 2--helps with opioid sparing  o Toradol 15 mg IV x1   o Maintenance IV fluids with LR  o Other: Zofran 8 mg IV PRN nausea    Inpatient:  o Home oxycodone/Oxycontin regimen, though home oxycodone dosing could be increased to 20 mg to start  o PCA plan:   - None for now  o Other Medications: Zofran  o She has had success with ketamine starting at 4mg/h and advancing only to 6mg/h, as 8mg/h made her feel quite poorly..  o ASA  o Supportive Care: Docusate, Senna  Chronic Pain Medications:    Home regimen Oxycontin 10 mg q12h, oxycodone 15 mg p.o. q.4-6 hours p.r.n. breakthrough pain.  She also " continues on Voltaren gel, and Zoloft among other medications.    -Also benefits from mental health visits, acupuncture  Baseline Hemoglobin: 7 g/dl without chronic transfusions  Hydroxyurea use: Yes  H/O blood transfusions: Yes, several (iron overload) Most recent 11/20/2021    H/O Transfusion Reactions: no    Antibodies:none  H/O Acute Chest Syndrome: Yes    Last episode:9/05/22 (previously 4/26/21, 10/2019)     ICU/intubation: not with 9/2022 admission  H/O Stroke: Yes (managed with chronic transfusions in the past, stopped late Spring 2020)  H/O VTE: Yes (2/2021)  H/O Cholecystectomy or Splenectomy: no  H/O Asthma, Pulm HTN, AVN, Leg Ulcers, Nephropathy, Retinopathy, etc: Iron overload, asthma, chronic lung disease, physical limitations from early stroke    ---------------------------------------  Jennifer Cervantes's Goals (discussed toady 3/27/23)    1-3 month goal:  Look for a job    6 month goal:      12 month goal:  Move into her own place     Disease-specific goal(s):  Continue to wean oxycodone down every 3-4 weeks  ---------------------------------------      Current Outpatient Medications   Medication Sig Dispense Refill     acetaminophen (TYLENOL) 325 MG tablet Take 2 tablets (650 mg) by mouth every 6 hours as needed for mild pain 120 tablet 3     albuterol (PROVENTIL) (2.5 MG/3ML) 0.083% neb solution Take 2 vials (5 mg) by nebulization every 6 hours as needed for shortness of breath / dyspnea or wheezing 90 mL 3     aspirin (ASA) 81 MG chewable tablet Take 1 tablet (81 mg) by mouth 2 times daily 60 tablet 11     budesonide-formoterol (SYMBICORT) 160-4.5 MCG/ACT Inhaler Inhale 2 puffs into the lungs 2 times daily 10.2 g 3     deferasirox (JADENU) 360 MG tablet Take 4 tablets (1,440 mg) by mouth every evening 120 tablet 4     diclofenac (VOLTAREN) 1 % topical gel Apply 4 g topically 4 times daily 350 g 0     EPINEPHrine (ANY BX GENERIC EQUIV) 0.3 MG/0.3ML injection 2-pack Inject 0.3 mLs (0.3 mg) into the  muscle as needed for anaphylaxis (Patient not taking: Reported on 3/10/2023) 1 each 1     FLUoxetine (PROZAC) 10 MG capsule Take 1 capsule (10 mg) by mouth daily For two weeks, then increase to 20 mg if 10 mg not effective 40 capsule 1     folic acid (FOLVITE) 1 MG tablet Take 1 tablet (1 mg) by mouth daily 30 tablet 0     Hydroxyurea 1000 MG TABS Take 3,000 mg by mouth daily 90 tablet 3     medroxyPROGESTERone (PROVERA) 2.5 MG tablet Take 1 tablet (2.5 mg) by mouth daily for 60 days 60 tablet 0     naloxone (NARCAN) 4 MG/0.1ML nasal spray Spray 4 mg into one nostril alternating nostrils as needed for opioid reversal every 2-3 minutes until assistance arrives (Patient not taking: Reported on 3/10/2023)       ondansetron (ZOFRAN) 8 MG tablet Take 1 tablet (8 mg) by mouth every 8 hours as needed 30 tablet 1     oxyCODONE IR (ROXICODONE) 15 MG tablet Take 1 tablet (15 mg) by mouth every 4 hours as needed for severe pain (7-10) Goal 4 per day. Max 6 per day. 30 tablet 0     traZODone (DESYREL) 50 MG tablet Take 1 tablet (50 mg) by mouth At Bedtime 30 tablet 1       Past Medical History  Past Medical History:   Diagnosis Date     Anxiety      Bleeding disorder (H)      Blood clotting disorder (H)      Cerebral infarction (H) 2015     Cognitive developmental delay     low IQ. Please recognize when managing pain and planning with her     Depressive disorder      Hemiplegia and hemiparesis following cerebral infarction affecting right dominant side (H)     right hand contractures     Iron overload due to repeated red blood cell transfusions      Migraines      Multiple subsegmental pulmonary emboli without acute cor pulmonale (H) 02/01/2021     Oppositional defiant behavior      Presence of intrauterine contraceptive device 2/18/2020     Superficial venous thrombosis of arm, right 03/25/2021     Uncomplicated asthma      Past Surgical History:   Procedure Laterality Date     AS INSERT TUNNELED CV 2 CATH W/O PORT/PUMP        CHOLECYSTECTOMY       CV RIGHT HEART CATH MEASUREMENTS RECORDED N/A 11/18/2021    Procedure: Right Heart Cath;  Surgeon: Jackson Stauffer MD;  Location:  HEART CARDI[facility] CATH LAB     INSERT PORT VASCULAR ACCESS Left 4/21/2021    Procedure: INSERTION, VASCULAR ACCESS PORT (NOT SURE ON SIDE UNTIL REMOVAL);  Surgeon: Rajan More MD;  Location: [facility] OR     IR CHEST PORT PLACEMENT > 5 YRS OF AGE  4/21/2021     IR CVC NON TUNNEL LINE REMOVAL  5/6/2021     IR CVC NON TUNNEL PLACEMENT > 5 YRS  04/07/2020     IR CVC NON TUNNEL PLACEMENT > 5 YRS  4/30/2021     IR CVC NON TUNNEL PLACEMENT > 5 YRS  9/7/2022     IR PORT REMOVAL LEFT  4/21/2021     REMOVE PORT VASCULAR ACCESS Left 4/21/2021    Procedure: REMOVAL, VASCULAR ACCESS PORT LEFT;  Surgeon: Rajan More MD;  Location: [facility] OR     REPAIR TENDON ELBOW Right 10/02/2019    Procedure: Right Elbow Flexor Lengthening, Flexor Pronator Slide Of Wrist and Finger, Thumb Adductor Lengthening;  Surgeon: Anai Franco MD;  Location: [facility] OR     TONSILLECTOMY Bilateral 10/02/2019    Procedure: Bilateral Tonsillectomy;  Surgeon: Farhana Guy MD;  Location: [facility] OR     ZZC BREAST REDUCTION (INCLUDES LIPO) TIER 3 Bilateral 04/23/2019     Allergies   Allergen Reactions     Contrast Dye      Hives and breathing issues     Fish-Derived Products Hives     Seafood Hives     Diagnostic X-Ray Materials      Gadolinium      Social History   Social History     Tobacco Use     Smoking status: Never     Smokeless tobacco: Never   Substance Use Topics     Alcohol use: Not Currently     Alcohol/week: 0.0 standard drinks     Drug use: Never    Still living at home with mom and extended family. Wanting to live independently    Past medical history and social history were reviewed.    Physical Examination:  LMP 12/01/2019 (Approximate)   Wt Readings from Last 10 Encounters:   03/27/23 69.8 kg (153 lb 14.4 oz)   03/16/23 68.1 kg (150 lb 3.2 oz)   03/10/23 67.6 kg (149 lb)    03/06/23 69.1 kg (152 lb 4.8 oz)   02/02/23 70.3 kg (155 lb)   01/30/23 68.9 kg (152 lb)   01/26/23 68.9 kg (152 lb)   01/15/23 68.9 kg (151 lb 14.4 oz)   01/02/23 74.3 kg (163 lb 12.8 oz)   12/24/22 74.8 kg (165 lb)     General: Well-appearing female, NAD, smiling and engaging today  Eyes: EOMI, PERRL. No scleral icterus.  Cardiovascular: RRR No murmurs, gallops, or rubs. No peripheral edema.  Respiratory: CTA bilaterally. No wheezes or crackles.  MSK: Contractured right arm and hand 2/2 stroke.  Neurologic: Known disabilities as described above. No new concerns. A/O x 4.  Skin: No rashes, petechiae, or bruising noted on exposed skin. Port accessed in left upper chest    Laboratory Data:       Latest Reference Range & Units 03/27/23 11:36   Sodium 136 - 145 mmol/L 140   Potassium 3.4 - 5.3 mmol/L 4.0   Chloride 98 - 107 mmol/L 109 (H)   Carbon Dioxide (CO2) 22 - 29 mmol/L 19 (L)   Urea Nitrogen 6.0 - 20.0 mg/dL 10.4   Creatinine 0.51 - 0.95 mg/dL 0.51   GFR Estimate >60 mL/min/1.73m2 >90   Calcium 8.6 - 10.0 mg/dL 8.9   Anion Gap 7 - 15 mmol/L 12   Albumin 3.5 - 5.2 g/dL 4.3   Protein Total 6.4 - 8.3 g/dL 7.5   Alkaline Phosphatase 35 - 104 U/L 77   ALT 10 - 35 U/L 18   AST  See Comment   Bilirubin Total <=1.2 mg/dL 3.3 (H)   Ferritin 6 - 175 ng/mL 6,169 (H)   Glucose 70 - 99 mg/dL 93   WBC 4.0 - 11.0 10e3/uL 12.3 (H)   Hemoglobin 11.7 - 15.7 g/dL 7.9 (L)   Hematocrit 35.0 - 47.0 % 22.3 (L)   Platelet Count 150 - 450 10e3/uL 475 (H)   RBC Count 3.80 - 5.20 10e6/uL 2.57 (L)   MCV 78 - 100 fL 87   MCH 26.5 - 33.0 pg 30.7   MCHC 31.5 - 36.5 g/dL 35.4   RDW 10.0 - 15.0 % 25.0 (H)   % Neutrophils % 73   % Lymphocytes % 15   % Monocytes % 7   % Eosinophils % 4   % Basophils % 1   Absolute Basophils 0.0 - 0.2 10e3/uL 0.2   Absolute Eosinophils 0.0 - 0.7 10e3/uL 0.4   Absolute Immature Granulocytes <=0.4 10e3/uL 0.1   Absolute Lymphocytes 0.8 - 5.3 10e3/uL 1.9   Absolute Monocytes 0.0 - 1.3 10e3/uL 0.8   % Immature  Granulocytes % 0   Absolute Neutrophils 1.6 - 8.3 10e3/uL 9.0 (H)   Absolute NRBCs 10e3/uL 0.3   NRBCs per 100 WBC <1 /100 3 (H)   % Retic 0.5 - 2.0 % 19.9 (H)   Absolute Retic 0.025 - 0.095 10e6/uL 0.500 (H)   (H): Data is abnormally high  (L): Data is abnormally low      Most recent labs and imaging reviewed and interpreted by me today.    Assessment and Plan:  1. Sickle Cell HgbSS Disease  2. Iron Overload  3. Chronic Pain  4. 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 History of CVA  6. Hearing loss    Jennifer continues to adhere to twice weekly infusion visits which we will continue at this time.She did not ask for more today, though she was open to considering once weekly every once in a while. She was seen in infusion today. Labs look good today.     Iron Overload  We will hold the Desferal for now and continue the Brenda Henry ordered for the next week.    Pain  The oxycodone prescription was further tapered to 30 tablets per fill on 3/13/23. Jennifer will continue to refill her prescriptions on Mondays. She remains motivated to continue to taper although is somewhat hesitant to name an end date when we expect her to be off of opioids. Previously she mentioned she'd like to be off of oxycodone by June. It seems reasonable to keep this open-ended right now since we're heading in the right direction. She would like to see me monthly to ensure we are following up on a regular basis to further encourage her tapering pain which is reasonable.   -She can self-reduce infusion days/week but continue to keep the cap at 2/week for now.  -Continue infusion center visits limited to two times per week (Mondays and Fridays). Continue diligent home management with current medications, heat, rest, compression, warm baths.  -If unable to manage at home can go to ED but continue to not do IV narcotics in the emergency room. Ketamine previously added to pain plan in ED     SCD  -Continue  Hydrea to 3000mg daily to help lessen frequency of sickle cell pain. Refilled 3/13.    Stroke Hx  -Continue aspirin BID and PMR visits as indicated    Follow up  -RTC 3/27 with Patricia Mantilla monthly with me every other month. Will try to coordinate with anticipated PCP visits with Dr. Case as needed    41 minutes spent on the date of the encounter doing chart review, review of test results, interpretation of tests, patient visit and documentation         Eric Duncan MD  Classical Hematologist  Division of Hematology, Oncology, and Transplantation  [facility]  [facility]  Pager: [phone]

## 2023-03-27 NOTE — LETTER
3/27/2023         RE: Jennifer Cervantes  [address]  [facility] [address]        Dear Colleague,    Thank you for referring your patient, Jennifer Cervantes, to the [facility]. Please see a copy of my visit note below.    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 She had multiple thromboembolic events in 2021 despite adherent anticoagulation use (though warfarin was perpetually low) and there are concerns for chronic thromboembolic disease but did not have pulmonary HTN on a November 2021 cath. She is maintained on chronic PO opioids and twice-weekly infusion visits (since 1/24/22) but has been able to be maintained on this regimen and has stayed out of the ED most of the time with even rarer admissions.     Interval History:  Jennifer is seen back today for short interval follow-up after a requested follow up with RONALDO Manitlla two weeks ago. She is feeling well overall today. No cough or SOB. No fever. She does not have any sick contacts. She is feeling good because she has some leads on Nv[employer] for disabled individuals (she said she has been on the wait list for 6-7 years) and is doing some job interviews too. She has a bit of pain today but describes it as her normal pain. She has done well with the recent decrease in weekly oxycodone Rx. She does feel like the home weekend Desferal is taking a toll on her quality of life on the weekends it is running, and she has had concerns about the needle coming  "out. She has tolerated it well but is wanting a break. She isn't sure about two oral chelators right now so she wanted to just do the Jadenu. She is excited for the warmer weather coming.    Sickle Cell Disease Comprehensive Checklist    Bone Health/Avascular Necrosis Screening/Symptoms (each visit): no new concerns today    Leg Ulcer evaluation (every visit): none    Hypertension (every visit):stable 3/27/23    Last pulmonary evaluation (asthma, AMAN, pulm HTN): 9/28/22    Stroke/silent cerebral infarct Hx (Y/N): Yes TIA ~2014, first event ~age 1 with full stroke and R sided weakness    Last PCP Visit: 3/6/23    Vaccines:  ? PCV13: 5/13/19  ? Pneumovax (PPSV23): 3/04, 10/09, 7/12/19 (next due 7/2024)  ? Menactra: 4/2010, 9/2015, 8/16/21 (due 2026)  ? Influenza: 11/17/2022     Audiology (chelation): done 6/2020, normal.. However, on 6/7, \"While there is no significant change in grade on the CTCAE 4.03 Scale, there were hearing changes bilaterally for both standard and extended high frequency audiometry.  Will need to determine if an ENT consult is advised due to the asymmetry in extended high frequency testing.\"     Plan last reviewed with patient: 7/11/22    Patient background: 21 yo F, enjoys movies and kids though there are times where she does not really want to talk to people. Does not have a lot of social support at home.     Sickle Cell Disease History  Primary Hematologist Team: Jose Rafael Duncan  PCP: none  Genotype: SS  Acute Pain Crisis Treatment: (avoiding IV opioids for now, which she has agreed to)    ER   o Oxycodone 15-20 mg x1  o Ketamine 4mg IV x 2--helps with opioid sparing  o Toradol 15 mg IV x1   o Maintenance IV fluids with LR  o Other: Zofran 8 mg IV PRN nausea    Inpatient:  o Home oxycodone/Oxycontin regimen, though home oxycodone dosing could be increased to 20 mg to start  o PCA plan:   - None for now  o Other Medications: Zofran  o She has had success with ketamine starting at 4mg/h and " advancing only to 6mg/h, as 8mg/h made her feel quite poorly..  o ASA  o Supportive Care: Docusate, Senna  Chronic Pain Medications:    Home regimen Oxycontin 10 mg q12h, oxycodone 15 mg p.o. q.4-6 hours p.r.n. breakthrough pain.  She also continues on Voltaren gel, and Zoloft among other medications.    -Also benefits from mental health visits, acupuncture  Baseline Hemoglobin: 7 g/dl without chronic transfusions  Hydroxyurea use: Yes  H/O blood transfusions: Yes, several (iron overload) Most recent 11/20/2021    H/O Transfusion Reactions: no    Antibodies:none  H/O Acute Chest Syndrome: Yes    Last episode:9/05/22 (previously 4/26/21, 10/2019)     ICU/intubation: not with 9/2022 admission  H/O Stroke: Yes (managed with chronic transfusions in the past, stopped late Spring 2020)  H/O VTE: Yes (2/2021)  H/O Cholecystectomy or Splenectomy: no  H/O Asthma, Pulm HTN, AVN, Leg Ulcers, Nephropathy, Retinopathy, etc: Iron overload, asthma, chronic lung disease, physical limitations from early stroke    ---------------------------------------  Jennifer Cervantes's Goals (discussed toady 3/27/23)    1-3 month goal:  Look for a job    6 month goal:      12 month goal:  Move into her own place     Disease-specific goal(s):  Continue to wean oxycodone down every 3-4 weeks  ---------------------------------------      Current Outpatient Medications   Medication Sig Dispense Refill     acetaminophen (TYLENOL) 325 MG tablet Take 2 tablets (650 mg) by mouth every 6 hours as needed for mild pain 120 tablet 3     albuterol (PROVENTIL) (2.5 MG/3ML) 0.083% neb solution Take 2 vials (5 mg) by nebulization every 6 hours as needed for shortness of breath / dyspnea or wheezing 90 mL 3     aspirin (ASA) 81 MG chewable tablet Take 1 tablet (81 mg) by mouth 2 times daily 60 tablet 11     budesonide-formoterol (SYMBICORT) 160-4.5 MCG/ACT Inhaler Inhale 2 puffs into the lungs 2 times daily 10.2 g 3     deferasirox (JADENU) 360 MG tablet Take 4  tablets (1,440 mg) by mouth every evening 120 tablet 4     diclofenac (VOLTAREN) 1 % topical gel Apply 4 g topically 4 times daily 350 g 0     EPINEPHrine (ANY BX GENERIC EQUIV) 0.3 MG/0.3ML injection 2-pack Inject 0.3 mLs (0.3 mg) into the muscle as needed for anaphylaxis (Patient not taking: Reported on 3/10/2023) 1 each 1     FLUoxetine (PROZAC) 10 MG capsule Take 1 capsule (10 mg) by mouth daily For two weeks, then increase to 20 mg if 10 mg not effective 40 capsule 1     folic acid (FOLVITE) 1 MG tablet Take 1 tablet (1 mg) by mouth daily 30 tablet 0     Hydroxyurea 1000 MG TABS Take 3,000 mg by mouth daily 90 tablet 3     medroxyPROGESTERone (PROVERA) 2.5 MG tablet Take 1 tablet (2.5 mg) by mouth daily for 60 days 60 tablet 0     naloxone (NARCAN) 4 MG/0.1ML nasal spray Spray 4 mg into one nostril alternating nostrils as needed for opioid reversal every 2-3 minutes until assistance arrives (Patient not taking: Reported on 3/10/2023)       ondansetron (ZOFRAN) 8 MG tablet Take 1 tablet (8 mg) by mouth every 8 hours as needed 30 tablet 1     oxyCODONE IR (ROXICODONE) 15 MG tablet Take 1 tablet (15 mg) by mouth every 4 hours as needed for severe pain (7-10) Goal 4 per day. Max 6 per day. 30 tablet 0     traZODone (DESYREL) 50 MG tablet Take 1 tablet (50 mg) by mouth At Bedtime 30 tablet 1       Past Medical History  Past Medical History:   Diagnosis Date     Anxiety      Bleeding disorder (H)      Blood clotting disorder (H)      Cerebral infarction (H) 2015     Cognitive developmental delay     low IQ. Please recognize when managing pain and planning with her     Depressive disorder      Hemiplegia and hemiparesis following cerebral infarction affecting right dominant side (H)     right hand contractures     Iron overload due to repeated red blood cell transfusions      Migraines      Multiple subsegmental pulmonary emboli without acute cor pulmonale (H) 02/01/2021     Oppositional defiant behavior      Presence  of intrauterine contraceptive device 2/18/2020     Superficial venous thrombosis of arm, right 03/25/2021     Uncomplicated asthma      Past Surgical History:   Procedure Laterality Date     AS INSERT TUNNELED CV 2 CATH W/O PORT/PUMP       CHOLECYSTECTOMY       CV RIGHT HEART CATH MEASUREMENTS RECORDED N/A 11/18/2021    Procedure: Right Heart Cath;  Surgeon: Jackson Stauffer MD;  Location:  HEART CARDIAC CATH LAB     INSERT PORT VASCULAR ACCESS Left 4/21/2021    Procedure: INSERTION, VASCULAR ACCESS PORT (NOT SURE ON SIDE UNTIL REMOVAL);  Surgeon: Rajan oMre MD;  Location: [facility] OR     IR CHEST PORT PLACEMENT > 5 YRS OF AGE  4/21/2021     IR CVC NON TUNNEL LINE REMOVAL  5/6/2021     IR CVC NON TUNNEL PLACEMENT > 5 YRS  04/07/2020     IR CVC NON TUNNEL PLACEMENT > 5 YRS  4/30/2021     IR CVC NON TUNNEL PLACEMENT > 5 YRS  9/7/2022     IR PORT REMOVAL LEFT  4/21/2021     REMOVE PORT VASCULAR ACCESS Left 4/21/2021    Procedure: REMOVAL, VASCULAR ACCESS PORT LEFT;  Surgeon: Rajan More MD;  Location: [facility] OR     REPAIR TENDON ELBOW Right 10/02/2019    Procedure: Right Elbow Flexor Lengthening, Flexor Pronator Slide Of Wrist and Finger, Thumb Adductor Lengthening;  Surgeon: Anai Franco MD;  Location: [facility] OR     TONSILLECTOMY Bilateral 10/02/2019    Procedure: Bilateral Tonsillectomy;  Surgeon: Farhana Guy MD;  Location: [facility] OR     ZZC BREAST REDUCTION (INCLUDES LIPO) TIER 3 Bilateral 04/23/2019     Allergies   Allergen Reactions     Contrast Dye      Hives and breathing issues     Fish-Derived Products Hives     Seafood Hives     Diagnostic X-Ray Materials      Gadolinium      Social History   Social History     Tobacco Use     Smoking status: Never     Smokeless tobacco: Never   Substance Use Topics     Alcohol use: Not Currently     Alcohol/week: 0.0 standard drinks     Drug use: Never    Still living at home with mom and extended family. Wanting to live independently    Past medical  history and social history were reviewed.    Physical Examination:  LMP 12/01/2019 (Approximate)   Wt Readings from Last 10 Encounters:   03/27/23 69.8 kg (153 lb 14.4 oz)   03/16/23 68.1 kg (150 lb 3.2 oz)   03/10/23 67.6 kg (149 lb)   03/06/23 69.1 kg (152 lb 4.8 oz)   02/02/23 70.3 kg (155 lb)   01/30/23 68.9 kg (152 lb)   01/26/23 68.9 kg (152 lb)   01/15/23 68.9 kg (151 lb 14.4 oz)   01/02/23 74.3 kg (163 lb 12.8 oz)   12/24/22 74.8 kg (165 lb)     General: Well-appearing female, NAD, smiling and engaging today  Eyes: EOMI, PERRL. No scleral icterus.  Cardiovascular: RRR No murmurs, gallops, or rubs. No peripheral edema.  Respiratory: CTA bilaterally. No wheezes or crackles.  MSK: Contractured right arm and hand 2/2 stroke.  Neurologic: Known disabilities as described above. No new concerns. A/O x 4.  Skin: No rashes, petechiae, or bruising noted on exposed skin. Port accessed in left upper chest    Laboratory Data:       Latest Reference Range & Units 03/27/23 11:36   Sodium 136 - 145 mmol/L 140   Potassium 3.4 - 5.3 mmol/L 4.0   Chloride 98 - 107 mmol/L 109 (H)   Carbon Dioxide (CO2) 22 - 29 mmol/L 19 (L)   Urea Nitrogen 6.0 - 20.0 mg/dL 10.4   Creatinine 0.51 - 0.95 mg/dL 0.51   GFR Estimate >60 mL/min/1.73m2 >90   Calcium 8.6 - 10.0 mg/dL 8.9   Anion Gap 7 - 15 mmol/L 12   Albumin 3.5 - 5.2 g/dL 4.3   Protein Total 6.4 - 8.3 g/dL 7.5   Alkaline Phosphatase 35 - 104 U/L 77   ALT 10 - 35 U/L 18   AST  See Comment   Bilirubin Total <=1.2 mg/dL 3.3 (H)   Ferritin 6 - 175 ng/mL 6,169 (H)   Glucose 70 - 99 mg/dL 93   WBC 4.0 - 11.0 10e3/uL 12.3 (H)   Hemoglobin 11.7 - 15.7 g/dL 7.9 (L)   Hematocrit 35.0 - 47.0 % 22.3 (L)   Platelet Count 150 - 450 10e3/uL 475 (H)   RBC Count 3.80 - 5.20 10e6/uL 2.57 (L)   MCV 78 - 100 fL 87   MCH 26.5 - 33.0 pg 30.7   MCHC 31.5 - 36.5 g/dL 35.4   RDW 10.0 - 15.0 % 25.0 (H)   % Neutrophils % 73   % Lymphocytes % 15   % Monocytes % 7   % Eosinophils % 4   % Basophils % 1   Absolute  Basophils 0.0 - 0.2 10e3/uL 0.2   Absolute Eosinophils 0.0 - 0.7 10e3/uL 0.4   Absolute Immature Granulocytes <=0.4 10e3/uL 0.1   Absolute Lymphocytes 0.8 - 5.3 10e3/uL 1.9   Absolute Monocytes 0.0 - 1.3 10e3/uL 0.8   % Immature Granulocytes % 0   Absolute Neutrophils 1.6 - 8.3 10e3/uL 9.0 (H)   Absolute NRBCs 10e3/uL 0.3   NRBCs per 100 WBC <1 /100 3 (H)   % Retic 0.5 - 2.0 % 19.9 (H)   Absolute Retic 0.025 - 0.095 10e6/uL 0.500 (H)   (H): Data is abnormally high  (L): Data is abnormally low      Most recent labs and imaging reviewed and interpreted by me today.    Assessment and Plan:  1. Sickle Cell HgbSS Disease  2. Iron Overload  3. Chronic Pain  4. 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 History of CVA  6. Hearing loss    Jennifer continues to adhere to twice weekly infusion visits which we will continue at this time.She did not ask for more today, though she was open to considering once weekly every once in a while. She was seen in infusion today. Labs look good today.     Iron Overload  We will hold the Desferal for now and continue the Brenda Henry ordered for the next week.    Pain  The oxycodone prescription was further tapered to 30 tablets per fill on 3/13/23. Jennifer will continue to refill her prescriptions on Mondays. She remains motivated to continue to taper although is somewhat hesitant to name an end date when we expect her to be off of opioids. Previously she mentioned she'd like to be off of oxycodone by June. It seems reasonable to keep this open-ended right now since we're heading in the right direction. She would like to see me monthly to ensure we are following up on a regular basis to further encourage her tapering pain which is reasonable.   -She can self-reduce infusion days/week but continue to keep the cap at 2/week for now.  -Continue infusion center visits limited to two times per week (Mondays and Fridays). Continue diligent home management with  current medications, heat, rest, compression, warm baths.  -If unable to manage at home can go to ED but continue to not do IV narcotics in the emergency room. Ketamine previously added to pain plan in ED     SCD  -Continue Hydrea to 3000mg daily to help lessen frequency of sickle cell pain. Refilled 3/13.    Stroke Hx  -Continue aspirin BID and PMR visits as indicated    Follow up  -RTC 3/27 with Patricia Mantilla monthly with me every other month. Will try to coordinate with anticipated PCP visits with Dr. Case as needed    41 minutes spent on the date of the encounter doing chart review, review of test results, interpretation of tests, patient visit and documentation         Eric Duncan MD  Classical Hematologist  Division of Hematology, Oncology, and Transplantation  [facility]  [facility]  Pager: [phone]

## 2023-03-27 NOTE — PATIENT INSTRUCTIONS
Dear Jennifer Cervantes    Thank you for choosing [facility]) for your infusion.  The following information is a summary of our appointment as well as important reminders.      We look forward in seeing you on your next appointment here at [facility]  Please don t hesitate to call us at [phone] to reschedule any of your appointments or to speak with one of the [facility] registered nurses.  It was a pleasure taking care of you today.    Sincerely,    [facility]  [facility]  [address] [place] [address]  [address]  Phone:  [phone]

## 2023-03-27 NOTE — TELEPHONE ENCOUNTER
Oncology Nurse Triage - Sickle Cell Pain Crisis:    Situation: Jennifer  calling about Sickle Cell Pain Crisis    Background:   Patient's last infusion was 3/23/23  Last clinic visit date:  Next follow-up appt on   Does patient have active treatment plan?  Yes    Assessment of Symptoms:  Onset/Duration of symptoms: 1 day    Is it typical sickle cell pain? Yes   Location: left side  Character: Sharp           Intensity: 10/10    Any radiation of pain, numbness, tingling, weakness, warmth, swelling, discoloration of extremities?No     Fever?No    Chest Pain Present: No     Shortness of breath: No     Other home therapies tried: HEAT/HEATING PAD     Last home medication taken and when: 0600 Oxycodone    Any Refills Needed?: Yes     Does patient have transportation & length of time to get to clinic: pt has appt at 1130, if appt is before 1030 will need a ride, otherwise already has a ride set up.    Grades for reference:       Grade 1 -   o Patient has active treatment plan.   o Patients last scheduled clinic appointment was attended  o Patient has not been seen in infusion or ED in the last 3 days (clarify exclusions in therapy plans)   o Afebrile   o Typically sickle cell pain   o Home medications have been tried   o No other symptoms     Recommendations:   Added to infusion wait list.  0732 return call to pt to offer 12pm appt in [facility] infusion today. Pt states she has appt with Dr. Duncan at same time. Writer sent page to Dr. Duncan, requesting he meet pt while she is in infusion, also sent message to MAURISIO Mcgee. Message sent to scheduling at 0736 to schedule, okayed by Roz Arellano, charge RN.     If you do not hear from the infusion center by 2pm then you will not be able to get in for an infusion today. If symptoms worsen while waiting for call back, and/or you experience fever, chills, SOB, chest pain, cough, n/v, dizziness, numbness, swelling, discoloration of extremities, then seek emergency evaluation in  Emergency Department.     Please note, if you are late for your appt, you risk losing your infusion appt as it may delay another patient's infusion who arrived on time.

## 2023-03-27 NOTE — PROGRESS NOTES
Nursing Note  Jennifer Cervantes presents [facility] for:   Chief Complaint   Patient presents with     Blood Draw     Port accessed by RN, labs collected, line flushed with saline and heparin.  Vitals taken. Pt checked in for appointment(s).      Infusion     IVF, analgesia       During today's [facility] appointment, orders from Dr. Duncan were completed.  Frequency: PRN    Progress note:  Patient identification verified by name and date of birth.  Assessment completed.  Vitals recorded in Doc Flowsheets.  Patient was provided with education regarding medication/procedure and possible side effects.  Patient verbalized understanding.     present during visit today: Not Applicable.    Treatment Conditions: Non-applicable.    Premedications: NA    Drug Waste Record: No    Infusion length and rate:  infusion given over approximately 2.5 hours.    Labs: drawn today, prior to appointment in second floor lab.    Vascular access: port accessed today, prior to appointment in second floor lab    Is the next appt scheduled? NA; pt arranges infusions per triage    Post Infusion Assessment:  Patient tolerated infusion without incident. Pain improved from 10/10 upon arrival, to 3/10 and tolerable at discharge.    Discharge Plan:   Follow up plan of care with: ongoing infusions at [facility] ordering provider as scheduled. and AVS to [facility].  Discharge instructions were reviewed with patient.  Patient/representative verbalized understanding of discharge instructions and all questions answered.  Patient discharged from [facility] in stable condition.    Malgorzata Holcomb RN    Administrations This Visit     diphenhydrAMINE (BENADRYL) capsule 25 mg     Admin Date  03/27/2023 Action  $Given Dose  25 mg Route  Oral Administered By  Malgorzata Holcomb RN          heparin 100 UNIT/ML injection 5 mL      Admin Date  03/27/2023 Action  $Given Dose  5 mL Route  Intracatheter Administered By  Malgorzata Holcomb RN          heparin 100 UNIT/ML injection 500 Units     Admin Date  03/27/2023 Action  $Given Dose  500 Units Route  Intracatheter Administered By  Echo Samuel RN          HYDROmorphone (DILAUDID) injection 1 mg     Admin Date  03/27/2023 Action  $Given Dose  1 mg Route  Intravenous Administered By  Malgorzata Holcomb RN           Admin Date  03/27/2023 Action  $Given Dose  1 mg Route  Intravenous Administered By  Malgorzata Holcomb RN           Admin Date  03/27/2023 Action  $Given Dose  1 mg Route  Intravenous Administered By  Roz Arellano RN          lactated ringers BOLUS 1,000 mL     Admin Date  03/27/2023 Action  $New Bag Dose  1,000 mL Rate  500 mL/hr Route  Intravenous Administered By  Malgorzata Holcomb RN          sodium chloride (PF) 0.9% PF flush 20 mL     Admin Date  03/27/2023 Action  $Given Dose  20 mL Route  Intracatheter Administered By  Echo Samuel RN                /75 (BP Location: Left arm, Patient Position: Chair, Cuff Size: Adult Regular)   Pulse 89   Temp 98.8  F (37.1  C) (Oral)   Resp 18   Wt 69.8 kg (153 lb 14.4 oz)   LMP 12/01/2019 (Approximate)   SpO2 94%   BMI 26.40 kg/m

## 2023-03-27 NOTE — TELEPHONE ENCOUNTER
Narcotic Refill Request  Medication(s) requested:  Oxycodone 15mg tab  Person Requesting Refill: Jennifer  What pain is the medication treating: sickle cell pain  How is the medication being taken?  Does pt have enough for today? Will be out by this evening  Is pain being adequately controlled on the current regimen?: yes  Experiencing any side effects from medication?: no    Date of most recent appointment:  3/16/23 with Patricia Mantilla  Any No Show Visits: no   Next appointment:  3/27/23  Last fill date and by whom:  Patricia Mantilla on 3/20/23    Reviewed: no    Routed provider: Patricia Mantilla

## 2023-03-27 NOTE — NURSING NOTE
Chief Complaint   Patient presents with     Blood Draw     Port accessed by RN, labs collected, line flushed with saline and heparin.  Vitals taken. Pt checked in for appointment(s).      Echo Samuel RN

## 2023-03-28 ENCOUNTER — APPOINTMENT (OUTPATIENT)
Dept: ULTRASOUND IMAGING | Facility: CLINIC | Age: 23
End: 2023-03-28
Attending: EMERGENCY MEDICINE
Payer: COMMERCIAL

## 2023-03-28 VITALS
DIASTOLIC BLOOD PRESSURE: 56 MMHG | RESPIRATION RATE: 23 BRPM | SYSTOLIC BLOOD PRESSURE: 110 MMHG | HEIGHT: 64 IN | HEART RATE: 89 BPM | WEIGHT: 156 LBS | OXYGEN SATURATION: 96 % | BODY MASS INDEX: 26.63 KG/M2 | TEMPERATURE: 98.9 F

## 2023-03-28 LAB
ATRIAL RATE - MUSE: 112 BPM
DIASTOLIC BLOOD PRESSURE - MUSE: NORMAL MMHG
INTERPRETATION ECG - MUSE: NORMAL
P AXIS - MUSE: 70 DEGREES
PR INTERVAL - MUSE: 148 MS
QRS DURATION - MUSE: 74 MS
QT - MUSE: 324 MS
QTC - MUSE: 442 MS
R AXIS - MUSE: 50 DEGREES
SYSTOLIC BLOOD PRESSURE - MUSE: NORMAL MMHG
T AXIS - MUSE: 45 DEGREES
VENTRICULAR RATE- MUSE: 112 BPM

## 2023-03-28 PROCEDURE — 93931 UPPER EXTREMITY STUDY: CPT | Mod: RT

## 2023-03-28 PROCEDURE — 93971 EXTREMITY STUDY: CPT | Mod: 26 | Performed by: RADIOLOGY

## 2023-03-28 PROCEDURE — 250N000009 HC RX 250: Performed by: EMERGENCY MEDICINE

## 2023-03-28 PROCEDURE — 250N000011 HC RX IP 250 OP 636: Performed by: EMERGENCY MEDICINE

## 2023-03-28 PROCEDURE — 93971 EXTREMITY STUDY: CPT | Mod: RT

## 2023-03-28 PROCEDURE — 93931 UPPER EXTREMITY STUDY: CPT | Mod: 26 | Performed by: STUDENT IN AN ORGANIZED HEALTH CARE EDUCATION/TRAINING PROGRAM

## 2023-03-28 RX ORDER — HEPARIN SODIUM,PORCINE 10 UNIT/ML
5-10 VIAL (ML) INTRAVENOUS
Status: DISCONTINUED | OUTPATIENT
Start: 2023-03-28 | End: 2023-03-28 | Stop reason: HOSPADM

## 2023-03-28 RX ADMIN — Medication 5 ML: at 02:57

## 2023-03-28 RX ADMIN — Medication 4 MG: at 01:06

## 2023-03-28 ASSESSMENT — ACTIVITIES OF DAILY LIVING (ADL)
ADLS_ACUITY_SCORE: 35
ADLS_ACUITY_SCORE: 35

## 2023-03-28 NOTE — ED PROVIDER NOTES
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 She is distraught due to the pain upon presentation. She denies shortness of breath and lightheadedness. She states that she has had leg pain similar to what she is experiencing before, but has never experienced pain this severe before. She is unable to move her R arm but this is baseline for her.  She states it feels better to lay on her arm.  She denies fevers or chills, or any recent trauma.        Past Medical History  Past Medical History:   Diagnosis Date     Anxiety      Bleeding disorder (H)      Blood clotting disorder (H)      Cerebral infarction (H) 2015     Cognitive developmental delay     low IQ. Please recognize when managing pain and planning with her     Depressive disorder      Hemiplegia and hemiparesis following cerebral infarction affecting right dominant side (H)     right hand contractures     Iron overload due to repeated red blood cell transfusions      Migraines      Multiple subsegmental pulmonary emboli without acute cor pulmonale (H) 02/01/2021     Oppositional defiant behavior      Presence of intrauterine contraceptive device 2/18/2020     Superficial venous thrombosis of arm, right 03/25/2021     Uncomplicated asthma      Past Surgical History:   Procedure Laterality Date     AS INSERT TUNNELED CV 2 CATH W/O PORT/PUMP       CHOLECYSTECTOMY       CV RIGHT HEART CATH MEASUREMENTS RECORDED N/A 11/18/2021    Procedure: Right  Heart Cath;  Surgeon: Jackson Stauffer MD;  Location:  HEART CARDIAC CATH LAB     INSERT PORT VASCULAR ACCESS Left 4/21/2021    Procedure: INSERTION, VASCULAR ACCESS PORT (NOT SURE ON SIDE UNTIL REMOVAL);  Surgeon: Rajan More MD;  Location: [facility] OR     IR CHEST PORT PLACEMENT > 5 YRS OF AGE  4/21/2021     IR CVC NON TUNNEL LINE REMOVAL  5/6/2021     IR CVC NON TUNNEL PLACEMENT > 5 YRS  04/07/2020     IR CVC NON TUNNEL PLACEMENT > 5 YRS  4/30/2021     IR CVC NON TUNNEL PLACEMENT > 5 YRS  9/7/2022     IR PORT REMOVAL LEFT  4/21/2021     REMOVE PORT VASCULAR ACCESS Left 4/21/2021    Procedure: REMOVAL, VASCULAR ACCESS PORT LEFT;  Surgeon: Rajan More MD;  Location: [facility] OR     REPAIR TENDON ELBOW Right 10/02/2019    Procedure: Right Elbow Flexor Lengthening, Flexor Pronator Slide Of Wrist and Finger, Thumb Adductor Lengthening;  Surgeon: Anai Franco MD;  Location: [facility] OR     TONSILLECTOMY Bilateral 10/02/2019    Procedure: Bilateral Tonsillectomy;  Surgeon: Farhana Guy MD;  Location: [facility] OR     ZZC BREAST REDUCTION (INCLUDES LIPO) TIER 3 Bilateral 04/23/2019     acetaminophen (TYLENOL) 325 MG tablet  albuterol (PROVENTIL) (2.5 MG/3ML) 0.083% neb solution  aspirin (ASA) 81 MG chewable tablet  budesonide-formoterol (SYMBICORT) 160-4.5 MCG/ACT Inhaler  deferasirox (JADENU) 360 MG tablet  diclofenac (VOLTAREN) 1 % topical gel  EPINEPHrine (ANY BX GENERIC EQUIV) 0.3 MG/0.3ML injection 2-pack  FLUoxetine (PROZAC) 10 MG capsule  folic acid (FOLVITE) 1 MG tablet  Hydroxyurea 1000 MG TABS  medroxyPROGESTERone (PROVERA) 2.5 MG tablet  naloxone (NARCAN) 4 MG/0.1ML nasal spray  ondansetron (ZOFRAN) 8 MG tablet  oxyCODONE IR (ROXICODONE) 15 MG tablet  traZODone (DESYREL) 50 MG tablet      Allergies   Allergen Reactions     Contrast Dye      Hives and breathing issues     Fish-Derived Products Hives     Seafood Hives     Diagnostic X-Ray Materials      Gadolinium      Family History  Family  "History   Problem Relation Age of Onset     Sickle Cell Trait Mother      Hypertension Mother      Asthma Mother      Sickle Cell Trait Father      Glaucoma No family hx of      Macular Degeneration No family hx of      Diabetes No family hx of      Gout No family hx of      Social History   Social History     Tobacco Use     Smoking status: Never     Smokeless tobacco: Never   Substance Use Topics     Alcohol use: Not Currently     Alcohol/week: 0.0 standard drinks     Drug use: Never      Past medical history, past surgical history, medications, allergies, family history, and social history were reviewed with the patient. No additional pertinent items.      A medically appropriate review of systems was performed with pertinent positives and negatives noted in the HPI, and all other systems negative.    Physical Exam   BP: 137/77  Pulse: 119  Temp: 98.9  F (37.2  C)  Resp: 23  Height: 162.6 cm (5' 4\")  Weight: 70.8 kg (156 lb)  SpO2: 96 %  Physical Exam  General: Alert,  sitting on the bed in NAD  Neuro: awake alert moving extremities x 4 face symetrical,   Head: atraumatic  Eyes: palpebral conjunctiva normal - not pale  Mouth/Throat: mucous membranes moist  Chest/Pulm: clear BS bilaterally, no chest wall tenderness to palpation  Cardiovascular: Tachycardic, regular rate and rhythm  Abdomen:  soft non-tender +BS  Extremities: Patient is unable to move arm, but this appears to be baseline  Skin: non diaphoretic  warm and dry no redness, edema, swelling or warmth of the extremity    ED Course, Procedures, & Data      Nursing note were reviewed.  Past Medical records were reviewed.  Differential Diagnosis for this patient's chest pain includes ischemic chest pain pulmonary embolism aortic dissection pneumonia esophageal rupture pericarditis chest wall pain or referred pain from biliary colic.      Soon after presentation to the ER patient was placed on a cardiac monitor, provided oxygen via nasal canula and an EKG was " ordered which showed sinus tachycardia with no signs of acute ischemia or infarction.  EKG shows sinus tachycardia no ST or T wave changes to suggest ischemia or infarction.  Compared to EKG from January 12, 2023, minimal change.  QTc is normal. Dilaudid was given for pain given patient's presentation and severe pain, to address her pain immediately.    Given patient's history of blood clots, we discussed risks and benefits for CTA to evaluate for pulmonary embolism, as well as CTA to evaluate for dissection given patient's acute chest pain and right arm pain.  I spoke with both pharmacy and radiology to discuss risks and benefits given patient's history of allergic reaction.  Pharmacy was able to review and see patient had a CT scan in the last few months with contrast and Benadryl.  So plan was to obtain a CT with steroids and Benadryl and to have epi present if needed.  However patient declined.  We discussed risks and benefits in detail of imaging, however patient stated her chest pain resolved.  She stated she just still had pain in her arm.  In regards to patient's arm pain, she has no redness warmth or tenderness to suggest cellulitis.  She has had no recent trauma to suggest fracture or dislocation.  She has no change from neurologic baseline.  We did discuss concern for blood clot, so ultrasound was ordered to evaluate for both arterial or venous clot.  Ultrasound was negative for blood clot.  Patient received additional analgesia per her plan, and did request Dilaudid again but I let her know that her plan stated we should avoid IV narcotics.  Patient then stated she felt ready to be discharged home.    Patient discharged in good condition with questions answered. She was given [facility] discharge instructions and follow-up recommendations. Patient will return to the ED if symptoms worsen or she develops any of the concerning signs/symptoms as outlined in the EPIC d/c instructions. Otherwise she will follow up  in clinic as recommended in the d/c instructions.       Results for orders placed or performed during the hospital encounter of 03/27/23   US Upper Extremity Arterial Duplex Right     Status: None (Preliminary result)    Impression    RESIDENT PRELIMINARY INTERPRETATION  Impression:  Patent arterial flow of the right upper extremity.   US Upper Extremity Venous Duplex Right     Status: None (Preliminary result)    Impression    RESIDENT PRELIMINARY INTERPRETATION  IMPRESSION:  No evidence of right upper extremity deep venous thrombosis.   Troponin T, High Sensitivity     Status: Normal   Result Value Ref Range    Troponin T, High Sensitivity <6 <=14 ng/L   Comprehensive metabolic panel     Status: Abnormal   Result Value Ref Range    Sodium 138 136 - 145 mmol/L    Potassium 3.7 3.4 - 5.3 mmol/L    Chloride 107 98 - 107 mmol/L    Carbon Dioxide (CO2) 18 (L) 22 - 29 mmol/L    Anion Gap 13 7 - 15 mmol/L    Urea Nitrogen 8.0 6.0 - 20.0 mg/dL    Creatinine 0.50 (L) 0.51 - 0.95 mg/dL    Calcium 8.9 8.6 - 10.0 mg/dL    Glucose 89 70 - 99 mg/dL    Alkaline Phosphatase 73 35 - 104 U/L    AST 42 (H) 10 - 35 U/L    ALT 20 10 - 35 U/L    Protein Total 7.5 6.4 - 8.3 g/dL    Albumin 4.3 3.5 - 5.2 g/dL    Bilirubin Total 4.5 (H) <=1.2 mg/dL    GFR Estimate >90 >60 mL/min/1.73m2   CBC with platelets and differential     Status: Abnormal   Result Value Ref Range    WBC Count 16.2 (H) 4.0 - 11.0 10e3/uL    RBC Count 2.49 (L) 3.80 - 5.20 10e6/uL    Hemoglobin 7.6 (L) 11.7 - 15.7 g/dL    Hematocrit 22.1 (L) 35.0 - 47.0 %    MCV 89 78 - 100 fL    MCH 30.5 26.5 - 33.0 pg    MCHC 34.4 31.5 - 36.5 g/dL    RDW 25.4 (H) 10.0 - 15.0 %    Platelet Count 443 150 - 450 10e3/uL    % Neutrophils 74 %    % Lymphocytes 15 %    % Monocytes 7 %    % Eosinophils 2 %    % Basophils 1 %    % Immature Granulocytes 1 %    NRBCs per 100 WBC 2 (H) <1 /100    Absolute Neutrophils 12.0 (H) 1.6 - 8.3 10e3/uL    Absolute Lymphocytes 2.4 0.8 - 5.3 10e3/uL     Absolute Monocytes 1.2 0.0 - 1.3 10e3/uL    Absolute Eosinophils 0.4 0.0 - 0.7 10e3/uL    Absolute Basophils 0.2 0.0 - 0.2 10e3/uL    Absolute Immature Granulocytes 0.2 <=0.4 10e3/uL    Absolute NRBCs 0.3 10e3/uL   [place] Draw     Status: None    Narrative    The following orders were created for panel order [place] Draw.  Procedure                               Abnormality         Status                     ---------                               -----------         ------                     Extra Red Top Tube[[id number]]                               Final result                 Please view results for these tests on the individual orders.   Extra Red Top Tube     Status: None   Result Value Ref Range    Hold Specimen JIC    [place] Draw     Status: None    Narrative    The following orders were created for panel order [place] Draw.  Procedure                               Abnormality         Status                     ---------                               -----------         ------                     Extra Blue Top Tube[[id number]]                              Final result                 Please view results for these tests on the individual orders.   Extra Blue Top Tube     Status: None   Result Value Ref Range    Hold Specimen JIC    HCG qualitative pregnancy (blood)     Status: Normal   Result Value Ref Range    hCG Serum Qualitative Negative Negative   EKG 12-lead, tracing only     Status: None (Preliminary result)   Result Value Ref Range    Systolic Blood Pressure  mmHg    Diastolic Blood Pressure  mmHg    Ventricular Rate 112 BPM    Atrial Rate 112 BPM    ME Interval 148 ms    QRS Duration 74 ms     ms    QTc 442 ms    P Axis 70 degrees    R AXIS 50 degrees    T Axis 45 degrees    Interpretation ECG Sinus tachycardia  Otherwise normal ECG      CBC with platelets differential     Status: Abnormal    Narrative    The following orders were created for panel order CBC with platelets  differential.  Procedure                               Abnormality         Status                     ---------                               -----------         ------                     CBC with platelets and d...[[id number]]  Abnormal            Final result                 Please view results for these tests on the individual orders.     Medications   diphenhydrAMINE (BENADRYL) capsule 50 mg (has no administration in time range)     Or   diphenhydrAMINE (BENADRYL) injection 25 mg (has no administration in time range)   ondansetron (ZOFRAN) injection 8 mg (has no administration in time range)   EPINEPHrine (ADRENALIN) kit 0.3 mg (0.3 mg Intramuscular Not Given 3/28/23 0235)   sodium chloride (PF) 0.9% PF flush 10-20 mL (20 mLs Intracatheter $Given 3/28/23 0257)   heparin lock flush 10 UNIT/ML injection 5-10 mL (5 mLs Intracatheter $Given 3/28/23 0257)   0.9% sodium chloride BOLUS (0 mLs Intravenous Stopped 3/27/23 2323)   HYDROmorphone (DILAUDID) injection 1 mg (1 mg Intravenous $Given 3/27/23 2145)   methylPREDNISolone sodium succinate (solu-MEDROL) injection 37.5 mg (37.5 mg Intravenous $Given 3/27/23 2147)   ketorolac (TORADOL) injection 30 mg (30 mg Intravenous $Given 3/27/23 2236)   oxyCODONE IR (ROXICODONE) tablet 20 mg (20 mg Oral $Given 3/27/23 2235)   ketamine (KETALAR) injection 4 mg (4 mg Intravenous $Given 3/28/23 0106)     Labs Ordered and Resulted from Time of ED Arrival to Time of ED Departure   COMPREHENSIVE METABOLIC PANEL - Abnormal       Result Value    Sodium 138      Potassium 3.7      Chloride 107      Carbon Dioxide (CO2) 18 (*)     Anion Gap 13      Urea Nitrogen 8.0      Creatinine 0.50 (*)     Calcium 8.9      Glucose 89      Alkaline Phosphatase 73      AST 42 (*)     ALT 20      Protein Total 7.5      Albumin 4.3      Bilirubin Total 4.5 (*)     GFR Estimate >90     CBC WITH PLATELETS AND DIFFERENTIAL - Abnormal    WBC Count 16.2 (*)     RBC Count 2.49 (*)     Hemoglobin 7.6 (*)      Hematocrit 22.1 (*)     MCV 89      MCH 30.5      MCHC 34.4      RDW 25.4 (*)     Platelet Count 443      % Neutrophils 74      % Lymphocytes 15      % Monocytes 7      % Eosinophils 2      % Basophils 1      % Immature Granulocytes 1      NRBCs per 100 WBC 2 (*)     Absolute Neutrophils 12.0 (*)     Absolute Lymphocytes 2.4      Absolute Monocytes 1.2      Absolute Eosinophils 0.4      Absolute Basophils 0.2      Absolute Immature Granulocytes 0.2      Absolute NRBCs 0.3     TROPONIN T, HIGH SENSITIVITY - Normal    Troponin T, High Sensitivity <6     HCG QUALITATIVE PREGNANCY - Normal    hCG Serum Qualitative Negative       US Upper Extremity Arterial Duplex Right   Preliminary Result   RESIDENT PRELIMINARY INTERPRETATION   Impression:   Patent arterial flow of the right upper extremity.      US Upper Extremity Venous Duplex Right   Preliminary Result   RESIDENT PRELIMINARY INTERPRETATION   IMPRESSION:   No evidence of right upper extremity deep venous thrombosis.               Assessment & Plan    (M79.621) Pain of right upper arm    (R07.9) Chest pain, unspecified type    Plan: Discharge to home with close follow up in Primary care clinic  Return to the ER with any worsening symptoms          I have reviewed the nursing notes. I have reviewed the findings, diagnosis, plan and need for follow up with the patient.    Discharge Medication List as of 3/28/2023  2:49 AM          Final diagnoses:   Pain of right upper arm   Chest pain, unspecified type   I, Tres Coates am serving as a trained medical scribe to document services personally performed by Nicolette Bynum MD, based on the provider's statements to me.     I, Nicolette Bynum MD, was physically present and have reviewed and verified the accuracy of this note documented by Tres Coates.      Nicolette Bynum MD  [facility] EMERGENCY DEPARTMENT  3/27/2023     Nicolette Bynum MD  03/28/23 0336

## 2023-03-28 NOTE — ED TRIAGE NOTES
She says she cannot lift her left arm. Her leg hurts as well. She went to an infusion today and the pain started after her appointment. She got a ride through [employer].

## 2023-03-28 NOTE — DISCHARGE INSTRUCTIONS
Thank you for choosing [facility] for your care. It was a pleasure taking care of you today in our Emergency Department.     Please follow up with your primary care provider in the next 2-3 days. However, if you have continued worsening symptoms, please return to the emergency department.

## 2023-03-29 ENCOUNTER — VIRTUAL VISIT (OUTPATIENT)
Dept: BEHAVIORAL HEALTH | Facility: CLINIC | Age: 23
End: 2023-03-29
Payer: COMMERCIAL

## 2023-03-29 DIAGNOSIS — F32.A DEPRESSION, UNSPECIFIED DEPRESSION TYPE: Primary | ICD-10-CM

## 2023-03-29 DIAGNOSIS — F41.9 ANXIETY DISORDER, UNSPECIFIED TYPE: ICD-10-CM

## 2023-03-29 ASSESSMENT — COLUMBIA-SUICIDE SEVERITY RATING SCALE - C-SSRS
TOTAL  NUMBER OF ABORTED OR SELF INTERRUPTED ATTEMPTS LIFETIME: NO
2. HAVE YOU ACTUALLY HAD ANY THOUGHTS OF KILLING YOURSELF?: NO
ATTEMPT LIFETIME: NO
6. HAVE YOU EVER DONE ANYTHING, STARTED TO DO ANYTHING, OR PREPARED TO DO ANYTHING TO END YOUR LIFE?: NO
TOTAL  NUMBER OF INTERRUPTED ATTEMPTS LIFETIME: NO
1. HAVE YOU WISHED YOU WERE DEAD OR WISHED YOU COULD GO TO SLEEP AND NOT WAKE UP?: NO

## 2023-03-29 NOTE — PROGRESS NOTES
[facility]   Mental Health & Addiction Services     Progress Note - Initial Visit    Patient  Name:  Jennifer Cervantes Date: 3/29/2023         Service Type: Individual     Visit Start Time: 3:30pm  Visit End Time: 4:26pm    Visit #: 1    Attendees: Client    Service Modality:  Video Visit: pt unable to be seen in video due to resting in a dark room.      Provider verified identity through the following two step process.  Patient provided:  Patient  and Patient address    Telemedicine Visit: The patient's condition can be safely assessed and treated via synchronous audio and visual telemedicine encounter.      Reason for Telemedicine Visit: Patient has requested telehealth visit    Originating Site (Patient Location): Patient's home    Distant Site (Provider Location): [facility] [place]    Consent:  The patient/guardian has verbally consented to: the potential risks and benefits of telemedicine (video visit) versus in person care; bill my insurance or make self-payment for services provided; and responsibility for payment of non-covered services.     Patient would like the video invitation sent by:  Text to cell phone: [phone]    Mode of Communication:  Video Conference via [employer]well    Distant Location (Provider):  Off-site    As the provider I attest to compliance with applicable laws and regulations related to telemedicine.       DATA:   Interactive Complexity: No   Crisis: No     Presenting Concerns/  Current Stressors:   Pt referred by oncology/outpatient intake for bridging services. Pt has been scheduled with [facility] on 2023. Pt reports recently being treated in the ED for pain and she is still recovering and resting at home. Pt reports a history of depression and anxiety. Pt reports having done therapy in the past, about 3-4 years ago. Pt reports looking to have someone to talk to about stressors and interpersonal/family conflict. Pt  reports struggling to feel understood by her mother and sister. Reviewed family history and the impact of her medical illness on her mental health and livelihood. Pt reports having mood swings frequently, but states it is often due to circumstances/situations/people. Pt denied any SI/SIB/HI or other safety concerns. Pt notes she's had depression for a long time but has never had the thought of ending her life or being better off dead.     ASSESSMENT:  Mental Status Assessment:  Appearance:   Unable to assess due to dark room   Eye Contact:   Unable to assess due to dark room   Psychomotor Behavior: Unable to assess due to dark room   Attitude:   Cooperative  Friendly Pleasant  Orientation:   All  Speech   Rate / Production: Normal/ Responsive   Volume:  Normal   Mood:    Normal  Affect:    Appropriate   Thought Content:  Clear   Thought Form:  Coherent  Logical   Insight:    Good       Safety Issues and Plan for Safety and Risk Management:     [place] Suicide Severity Rating Scale (Lifetime/Recent)  [place] Suicide Severity Rating (Lifetime/Recent) 3/29/2023   1. Wish to be Dead (Lifetime) 0   2. Non-Specific Active Suicidal Thoughts (Lifetime) 0   Actual Attempt (Lifetime) 0   Has subject engaged in non-suicidal self-injurious behavior? (Lifetime) 0   Interrupted Attempts (Lifetime) 0   Aborted or Self-Interrupted Attempt (Lifetime) 0   Preparatory Acts or Behavior (Lifetime) 0   Calculated C-SSRS Risk Score (Lifetime/Recent) No Risk Indicated     Patient denies current fears or concerns for personal safety.  Patient denies current or recent suicidal ideation or behaviors.  Patient denies current or recent homicidal ideation or behaviors.  Patient denies current or recent self injurious behavior or ideation.  Patient denies other safety concerns.  Recommended that patient call 911 or go to the local ED should there be a change in any of these risk factors.       Diagnostic Criteria:  Unspecified Anxiety Disorder  , Symptoms characteristic of an anxiety disorder that caused clinically significant distress or impairment in social, occupational, or other important areas of functioning predominate but do not meet the full criteria for any of the disorders of the anxiety disorders diagnostic class.    DSM5 Diagnoses: (Sustained by DSM5 Criteria Listed Above)  Diagnoses: 300.00 (F41.9) Unspecified Anxiety Disorder   311 (F32.9) Unspecified Depressive Disorder   Psychosocial & Contextual Factors: 22yo individual, lives with mother, 3 siblings and a nephew. Estranged relationship with biological father. Pt has ongoing medical issues and pain related to sickle cell anemia and hx of stroke. Pt graduated high school and was working but had to quit job due to her medical issues, pt has not been able to work for the last 5 years, pt will be starting new job at [employer] on 3/31/2023.   WHODAS 2.0 (12 item): No flowsheet data found.  Intervention:   Motivational Interviewing: Build rapport, active listening, validation, reassurance, and psychoeducation  Collateral Reports Completed:  Chart review       PLAN: (Homework, other):  1. Provider will continue Diagnostic Assessment.  Patient was given the following to do until next session:  Take medications as prescribed and practice self-care.    2. Provider recommended the following referrals: Pt has therapy appt with [facility] on 7/24/2023. Pt declined referral to community provider for sooner appt.    3.  Suicide Risk and Safety Concerns were assessed for Jennifer Cervantes.    Patient meets the following risk assessment and triage: Patient denied any current/recent/lifetime history of suicidal ideation and/or behaviors.  No safety plan indicated at this time.       Martina Limon, ROSALINDA  March 29, 2023

## 2023-03-30 ENCOUNTER — PATIENT OUTREACH (OUTPATIENT)
Dept: CARE COORDINATION | Facility: CLINIC | Age: 23
End: 2023-03-30
Payer: COMMERCIAL

## 2023-03-30 ENCOUNTER — NURSE TRIAGE (OUTPATIENT)
Dept: ONCOLOGY | Facility: CLINIC | Age: 23
End: 2023-03-30
Payer: COMMERCIAL

## 2023-03-30 ENCOUNTER — INFUSION THERAPY VISIT (OUTPATIENT)
Dept: TRANSPLANT | Facility: CLINIC | Age: 23
End: 2023-03-30
Attending: PEDIATRICS
Payer: COMMERCIAL

## 2023-03-30 VITALS
HEART RATE: 99 BPM | TEMPERATURE: 98.5 F | OXYGEN SATURATION: 95 % | SYSTOLIC BLOOD PRESSURE: 123 MMHG | DIASTOLIC BLOOD PRESSURE: 70 MMHG | RESPIRATION RATE: 16 BRPM

## 2023-03-30 DIAGNOSIS — D57.00 SICKLE CELL PAIN CRISIS (H): ICD-10-CM

## 2023-03-30 DIAGNOSIS — G81.10 SPASTIC HEMIPLEGIA, UNSPECIFIED ETIOLOGY, UNSPECIFIED LATERALITY (H): Primary | ICD-10-CM

## 2023-03-30 PROCEDURE — 250N000011 HC RX IP 250 OP 636: Performed by: PEDIATRICS

## 2023-03-30 PROCEDURE — 96374 THER/PROPH/DIAG INJ IV PUSH: CPT

## 2023-03-30 PROCEDURE — 258N000003 HC RX IP 258 OP 636: Performed by: PEDIATRICS

## 2023-03-30 PROCEDURE — 96361 HYDRATE IV INFUSION ADD-ON: CPT

## 2023-03-30 PROCEDURE — 96376 TX/PRO/DX INJ SAME DRUG ADON: CPT

## 2023-03-30 RX ORDER — HEPARIN SODIUM,PORCINE 10 UNIT/ML
5 VIAL (ML) INTRAVENOUS
Status: CANCELLED | OUTPATIENT
Start: 2023-07-01

## 2023-03-30 RX ORDER — ONDANSETRON 4 MG/1
8 TABLET, FILM COATED ORAL
Status: CANCELLED
Start: 2023-07-01

## 2023-03-30 RX ORDER — HEPARIN SODIUM (PORCINE) LOCK FLUSH IV SOLN 100 UNIT/ML 100 UNIT/ML
5 SOLUTION INTRAVENOUS
Status: CANCELLED | OUTPATIENT
Start: 2023-07-01

## 2023-03-30 RX ORDER — HEPARIN SODIUM (PORCINE) LOCK FLUSH IV SOLN 100 UNIT/ML 100 UNIT/ML
5 SOLUTION INTRAVENOUS
Status: DISCONTINUED | OUTPATIENT
Start: 2023-03-30 | End: 2023-03-30 | Stop reason: HOSPADM

## 2023-03-30 RX ORDER — DIPHENHYDRAMINE HCL 25 MG
25 CAPSULE ORAL
Status: CANCELLED
Start: 2023-07-01

## 2023-03-30 RX ADMIN — HYDROMORPHONE HYDROCHLORIDE 1 MG: 1 INJECTION, SOLUTION INTRAMUSCULAR; INTRAVENOUS; SUBCUTANEOUS at 10:09

## 2023-03-30 RX ADMIN — HYDROMORPHONE HYDROCHLORIDE 1 MG: 1 INJECTION, SOLUTION INTRAMUSCULAR; INTRAVENOUS; SUBCUTANEOUS at 11:13

## 2023-03-30 RX ADMIN — SODIUM CHLORIDE, POTASSIUM CHLORIDE, SODIUM LACTATE AND CALCIUM CHLORIDE 1000 ML: 600; 310; 30; 20 INJECTION, SOLUTION INTRAVENOUS at 10:08

## 2023-03-30 RX ADMIN — HYDROMORPHONE HYDROCHLORIDE 1 MG: 1 INJECTION, SOLUTION INTRAMUSCULAR; INTRAVENOUS; SUBCUTANEOUS at 12:10

## 2023-03-30 ASSESSMENT — PAIN SCALES - GENERAL: PAINLEVEL: WORST PAIN (10)

## 2023-03-30 NOTE — PROGRESS NOTES
Infusion Nursing Note:  Jennifer Cervantes presents today for IV fluids and pain medication.    Patient seen by provider today: No   present during visit today: Not Applicable.    Note:   Pt received a liter of LR over 2 hours. Pt received 1 mg dilaudid every hour for three doses (total of 3 mg dilaudid total).    Pt states had refills placed a few days ago, no refills needed today.    Pain decreased from 10 to 5; pt feels ok to go home.     Intravenous Access:  Implanted Port.    Treatment Conditions:  Not Applicable.    Post Infusion Assessment:  Patient tolerated infusion without incident.  Blood return noted pre and post infusion.  Site patent and intact, free from redness, edema or discomfort.  No evidence of extravasations.  Access discontinued per protocol.     Discharge Plan:   Discharge instructions reviewed with: Patient.  Patient discharged in stable condition accompanied by: self.  Departure Mode: Ambulatory.      Jamaica Napoles RN

## 2023-03-30 NOTE — TELEPHONE ENCOUNTER
Oncology Nurse Triage - Sickle Cell Pain Crisis:    Situation: Jennifer  calling about Sickle Cell Pain Crisis    Background:   Patient's last infusion was 3/27/23  Last clinic visit date: 3/27/23  Next follow-up appt on 4/14/23  Does patient have active treatment plan?  Yes    Assessment of Symptoms:  Onset/Duration of symptoms: 3 day    Is it typical sickle cell pain? Yes   Location: all over  Character: Sharp           Intensity: 10/10    Any radiation of pain, numbness, tingling, weakness, warmth, swelling, discoloration of extremities?No     Fever?No      Chest Pain Present: No     Shortness of breath: No     Other home therapies tried: HEAT/HEATING PAD     Last home medication taken and when: 0600 Oxycodone    Any Refills Needed?: No     Does patient have transportation & length of time to get to clinic: No will need transportation    Grades for reference:     Grade 1 -   o Patient has active treatment plan.   o Patients last scheduled clinic appointment was attended  o Patient has not been seen in infusion or ED in the last 3 days (clarify exclusions in therapy plans)   o Afebrile   o Typically sickle cell pain   o Home medications have been tried   o No other symptoms     Recommendations:   Added to infusion wait list.   Paged Dr. Duncan at 0711 and Patricia Mantilla at 0742 clarify if pt is ok to come in for infusion, as was in infusion 3/27 and ED same day.   0746 okayed by Patricia Mantilla.   0830 message sent to social workers requesting assistance with setting up transportation.   0843 call to pt to offer 0930 appt in B[facility] infusion per Allison charge nurse, informed ride is set up. Pt states she will be ready.   0845 message sent to scheduling.     If you do not hear from the infusion center by 2pm then you will not be able to get in for an infusion today. If symptoms worsen while waiting for call back, and/or you experience fever, chills, SOB, chest pain, cough, n/v, dizziness, numbness, swelling, discoloration  of extremities, then seek emergency evaluation in Emergency Department.

## 2023-03-30 NOTE — PROGRESS NOTES
Community Health Worker Note: Telephone Call  Oncology Clinic     Data/Intervention:  Patient Name:  Jennifer Cervantes  /Age: [birth year]     Call From: Triage Nurse        Reason for Call:  Transportation      Assessment:  CHW called [employer] ([phone])  to arrange ride through patient's insurance. [employer] arranged a round trip ride as a will call.  Patient will need to call when ready for return ride home [phone]. nurse talked to patient and she will call [employer] to setup  time     Plan:  Patient is aware of the transportation plan.  available to assist with any other needs.      Isaura OTTO Community Health Worker  Clinic Care Coordination  [facility]  Phone: [phone]

## 2023-03-30 NOTE — NURSING NOTE
"Oncology Rooming Note    March 30, 2023 1:58 PM   Jennifer Cervantes is a 23 year old female who presents for:    Chief Complaint   Patient presents with     Infusion     Add on infusion appointment for IV fluids and pain medications. Hx sickle cell disease.     Initial Vitals: /70 (BP Location: Right arm, Patient Position: Sitting, Cuff Size: Adult Regular)   Pulse 99   Temp 98.5  F (36.9  C) (Oral)   Resp 16   LMP 12/01/2019 (Approximate)   SpO2 95%  Estimated body mass index is 26.78 kg/m  as calculated from the following:    Height as of 3/27/23: 1.626 m (5' 4\").    Weight as of 3/27/23: 70.8 kg (156 lb). There is no height or weight on file to calculate BSA.  Worst Pain (10) Comment: Data Unavailable   Patient's last menstrual period was 12/01/2019 (approximate).  Allergies reviewed: Yes  Medications reviewed: Yes    Medications: MEDICATION REFILLS NEEDED TODAY. Provider was notified.  Pharmacy name entered into [facility]: [facility] [place], [place] - [id number]    Clinical concerns: n/a      Jamaica Napoles RN              "

## 2023-04-03 ENCOUNTER — PATIENT OUTREACH (OUTPATIENT)
Dept: CARE COORDINATION | Facility: CLINIC | Age: 23
End: 2023-04-03
Payer: COMMERCIAL

## 2023-04-03 ENCOUNTER — INFUSION THERAPY VISIT (OUTPATIENT)
Dept: INFUSION THERAPY | Facility: CLINIC | Age: 23
End: 2023-04-03
Payer: COMMERCIAL

## 2023-04-03 ENCOUNTER — NURSE TRIAGE (OUTPATIENT)
Dept: ONCOLOGY | Facility: CLINIC | Age: 23
End: 2023-04-03
Payer: COMMERCIAL

## 2023-04-03 VITALS — DIASTOLIC BLOOD PRESSURE: 74 MMHG | SYSTOLIC BLOOD PRESSURE: 107 MMHG | OXYGEN SATURATION: 99 % | HEART RATE: 100 BPM

## 2023-04-03 DIAGNOSIS — G81.10 SPASTIC HEMIPLEGIA, UNSPECIFIED ETIOLOGY, UNSPECIFIED LATERALITY (H): Primary | ICD-10-CM

## 2023-04-03 DIAGNOSIS — D57.00 SICKLE CELL PAIN CRISIS (H): ICD-10-CM

## 2023-04-03 PROCEDURE — 258N000003 HC RX IP 258 OP 636: Performed by: PEDIATRICS

## 2023-04-03 PROCEDURE — 96376 TX/PRO/DX INJ SAME DRUG ADON: CPT

## 2023-04-03 PROCEDURE — 96374 THER/PROPH/DIAG INJ IV PUSH: CPT

## 2023-04-03 PROCEDURE — 250N000013 HC RX MED GY IP 250 OP 250 PS 637: Performed by: PEDIATRICS

## 2023-04-03 PROCEDURE — 250N000011 HC RX IP 250 OP 636: Performed by: PEDIATRICS

## 2023-04-03 PROCEDURE — 96361 HYDRATE IV INFUSION ADD-ON: CPT

## 2023-04-03 RX ORDER — DIPHENHYDRAMINE HCL 25 MG
25 CAPSULE ORAL
Status: COMPLETED | OUTPATIENT
Start: 2023-04-03 | End: 2023-04-03

## 2023-04-03 RX ORDER — HEPARIN SODIUM,PORCINE 10 UNIT/ML
5 VIAL (ML) INTRAVENOUS
Status: CANCELLED | OUTPATIENT
Start: 2023-07-01

## 2023-04-03 RX ORDER — DIPHENHYDRAMINE HCL 25 MG
25 CAPSULE ORAL
Status: CANCELLED
Start: 2023-07-01

## 2023-04-03 RX ORDER — OXYCODONE HYDROCHLORIDE 15 MG/1
15 TABLET ORAL EVERY 4 HOURS PRN
Qty: 30 TABLET | Refills: 0 | Status: SHIPPED | OUTPATIENT
Start: 2023-04-03 | End: 2023-04-10

## 2023-04-03 RX ORDER — HEPARIN SODIUM (PORCINE) LOCK FLUSH IV SOLN 100 UNIT/ML 100 UNIT/ML
5 SOLUTION INTRAVENOUS
Status: DISCONTINUED | OUTPATIENT
Start: 2023-04-03 | End: 2023-04-03 | Stop reason: HOSPADM

## 2023-04-03 RX ORDER — HEPARIN SODIUM (PORCINE) LOCK FLUSH IV SOLN 100 UNIT/ML 100 UNIT/ML
5 SOLUTION INTRAVENOUS
Status: CANCELLED | OUTPATIENT
Start: 2023-07-01

## 2023-04-03 RX ORDER — ONDANSETRON 4 MG/1
8 TABLET, FILM COATED ORAL
Status: CANCELLED
Start: 2023-07-01

## 2023-04-03 RX ADMIN — SODIUM CHLORIDE, POTASSIUM CHLORIDE, SODIUM LACTATE AND CALCIUM CHLORIDE 1000 ML: 600; 310; 30; 20 INJECTION, SOLUTION INTRAVENOUS at 12:35

## 2023-04-03 RX ADMIN — DIPHENHYDRAMINE HYDROCHLORIDE 25 MG: 25 CAPSULE ORAL at 12:38

## 2023-04-03 RX ADMIN — Medication 5 ML: at 14:39

## 2023-04-03 RX ADMIN — HYDROMORPHONE HYDROCHLORIDE 1 MG: 1 INJECTION, SOLUTION INTRAMUSCULAR; INTRAVENOUS; SUBCUTANEOUS at 12:38

## 2023-04-03 RX ADMIN — HYDROMORPHONE HYDROCHLORIDE 1 MG: 1 INJECTION, SOLUTION INTRAMUSCULAR; INTRAVENOUS; SUBCUTANEOUS at 13:34

## 2023-04-03 RX ADMIN — HYDROMORPHONE HYDROCHLORIDE 1 MG: 1 INJECTION, SOLUTION INTRAMUSCULAR; INTRAVENOUS; SUBCUTANEOUS at 14:23

## 2023-04-03 NOTE — PROGRESS NOTES
Nursing Note  Jennifer Cervantes presents today to [facility] for: No chief complaint on file.    During today's [facility] appointment, orders from Dr. Duncan were completed.  Frequency: as needed    Progress note:  Patient identification verified by name and date of birth.  Assessment completed.  Vitals recorded in Doc Flowsheets.  Patient was provided with education regarding medication/procedure and possible side effects.  Patient verbalized understanding.     present during visit today: Not Applicable.    Treatment Conditions: Non-applicable.    Premedications: administered per order.    Drug Waste Record: No    Infusion length and rate:  infusion given over approximately 2 hours    Labs: were not ordered for this appointment.    Vascular access: port accessed today.    Is the next appt scheduled? prn    Post Infusion Assessment:  Patient tolerated infusion without incident.     Discharge Plan:   Follow up plan of care with: ordering provider as scheduled.  Discharge instructions were reviewed with patient.  Patient/representative verbalized understanding of discharge instructions and all questions answered.  Patient discharged from [facility] in stable condition.    Yina Ford RN    Administrations This Visit     diphenhydrAMINE (BENADRYL) capsule 25 mg     Admin Date  04/03/2023 Action  $Given Dose  25 mg Route  Oral Administered By  Yina Ford RN          heparin 100 UNIT/ML injection 5 mL     Admin Date  04/03/2023 Action  $Given Dose  5 mL Route  Intracatheter Administered By  Ashley Lomax RN          HYDROmorphone (DILAUDID) injection 1 mg     Admin Date  04/03/2023 Action  $Given Dose  1 mg Route  Intravenous Administered By  Yina Ford RN           Admin Date  04/03/2023 Action  $Given Dose  1 mg Route  Intravenous Administered By  Yina Ford RN           Admin Date  04/03/2023  Action  $Given Dose  1 mg Route  Intravenous Administered By  Yina Ford, RN          lactated ringers BOLUS 1,000 mL     Admin Date  04/03/2023 Action  $New Bag Dose  1,000 mL Rate  500 mL/hr Route  Intravenous Administered By  Yina Ford, RN                /74   Pulse 100   LMP 12/01/2019 (Approximate)   SpO2 99%

## 2023-04-03 NOTE — TELEPHONE ENCOUNTER
Pt called in to triage requesting IVF pain meds for back and arms pain rated 9/10 x3 days.     Stated last took prn oxycodone at 6am this morning without relief.     Denied any fevers, chills, cough, sob, chest pain, numbness or tingling or other symptoms not typical of sickle cell pain.     Pt's last infusion was 3/30/23, last clinic visit 3/27/23 with follow-up on 4/14/23 with Patricia Mantilla .     Patient states they do not have own ride and it will take 90 minutes to get to cancer clinic.     Pt needed refill of oxycodone separate refill encounter sent to Patricia Mantilla     Pt qualifies for sickle cell standing order protocol.    If you do not hear from the infusion center by 2pm then you will not be able to get in for an infusion today.

## 2023-04-03 NOTE — TELEPHONE ENCOUNTER
Jennifer called to ask if there is any availability in infusion center yet.  This writer informed caller that there are no openings currently but if there are openings and a time we can offer her, we will contact her.  Pt voiced understanding of this information.

## 2023-04-03 NOTE — PROGRESS NOTES
Social Work Note: Transportation  Oncology Clinic     Data/Intervention:  Patient Name:  Jennifer Cervantes  /Age: 2000, 23 years old     Call From: [facility]        Reason for Call:  Transportation     Assessment:   called [employer] to arrange ride. [employer] arranged  for patient from home with a will call ride home.  Patient will need to call when ready for return ride home ([phone]).      Plan:  Patient is aware of the transportation plan.  available to assist with any other needs.      CARLOS Chavez,SW  Hematology/Oncology Social Worker  Phone:[phone] Pager: [phone]

## 2023-04-03 NOTE — TELEPHONE ENCOUNTER
Noon opening today in [facility].     This will work for Jennifer alvarenga states.     Message sent to [facility] to schedule appt     Message sent to Social work to arrange transportation.

## 2023-04-06 ENCOUNTER — INFUSION THERAPY VISIT (OUTPATIENT)
Dept: TRANSPLANT | Facility: CLINIC | Age: 23
End: 2023-04-06
Attending: PEDIATRICS
Payer: COMMERCIAL

## 2023-04-06 ENCOUNTER — TELEPHONE (OUTPATIENT)
Dept: ONCOLOGY | Facility: CLINIC | Age: 23
End: 2023-04-06
Payer: COMMERCIAL

## 2023-04-06 ENCOUNTER — PATIENT OUTREACH (OUTPATIENT)
Dept: CARE COORDINATION | Facility: CLINIC | Age: 23
End: 2023-04-06
Payer: COMMERCIAL

## 2023-04-06 VITALS
TEMPERATURE: 97.9 F | RESPIRATION RATE: 16 BRPM | OXYGEN SATURATION: 96 % | HEART RATE: 81 BPM | DIASTOLIC BLOOD PRESSURE: 79 MMHG | SYSTOLIC BLOOD PRESSURE: 126 MMHG

## 2023-04-06 DIAGNOSIS — G81.10 SPASTIC HEMIPLEGIA, UNSPECIFIED ETIOLOGY, UNSPECIFIED LATERALITY (H): Primary | ICD-10-CM

## 2023-04-06 DIAGNOSIS — D57.00 SICKLE CELL PAIN CRISIS (H): ICD-10-CM

## 2023-04-06 PROCEDURE — 96374 THER/PROPH/DIAG INJ IV PUSH: CPT

## 2023-04-06 PROCEDURE — 258N000003 HC RX IP 258 OP 636: Performed by: PEDIATRICS

## 2023-04-06 PROCEDURE — 96376 TX/PRO/DX INJ SAME DRUG ADON: CPT

## 2023-04-06 PROCEDURE — 250N000011 HC RX IP 250 OP 636: Performed by: PEDIATRICS

## 2023-04-06 PROCEDURE — 96361 HYDRATE IV INFUSION ADD-ON: CPT

## 2023-04-06 PROCEDURE — 250N000013 HC RX MED GY IP 250 OP 250 PS 637: Performed by: PEDIATRICS

## 2023-04-06 RX ORDER — HEPARIN SODIUM (PORCINE) LOCK FLUSH IV SOLN 100 UNIT/ML 100 UNIT/ML
5 SOLUTION INTRAVENOUS
Status: CANCELLED | OUTPATIENT
Start: 2023-07-01

## 2023-04-06 RX ORDER — ONDANSETRON 4 MG/1
8 TABLET, FILM COATED ORAL
Status: CANCELLED
Start: 2023-07-01

## 2023-04-06 RX ORDER — HEPARIN SODIUM,PORCINE 10 UNIT/ML
5 VIAL (ML) INTRAVENOUS
Status: CANCELLED | OUTPATIENT
Start: 2023-07-01

## 2023-04-06 RX ORDER — HEPARIN SODIUM,PORCINE 10 UNIT/ML
5 VIAL (ML) INTRAVENOUS
Status: DISCONTINUED | OUTPATIENT
Start: 2023-04-06 | End: 2023-04-06 | Stop reason: HOSPADM

## 2023-04-06 RX ORDER — DIPHENHYDRAMINE HCL 25 MG
25 CAPSULE ORAL
Status: CANCELLED
Start: 2023-07-01

## 2023-04-06 RX ORDER — HEPARIN SODIUM (PORCINE) LOCK FLUSH IV SOLN 100 UNIT/ML 100 UNIT/ML
5 SOLUTION INTRAVENOUS
Status: DISCONTINUED | OUTPATIENT
Start: 2023-04-06 | End: 2023-04-06 | Stop reason: HOSPADM

## 2023-04-06 RX ORDER — DIPHENHYDRAMINE HCL 25 MG
25 CAPSULE ORAL
Status: COMPLETED | OUTPATIENT
Start: 2023-04-06 | End: 2023-04-06

## 2023-04-06 RX ADMIN — HYDROMORPHONE HYDROCHLORIDE 1 MG: 1 INJECTION, SOLUTION INTRAMUSCULAR; INTRAVENOUS; SUBCUTANEOUS at 12:16

## 2023-04-06 RX ADMIN — HYDROMORPHONE HYDROCHLORIDE 1 MG: 1 INJECTION, SOLUTION INTRAMUSCULAR; INTRAVENOUS; SUBCUTANEOUS at 11:12

## 2023-04-06 RX ADMIN — Medication 5 ML: at 13:42

## 2023-04-06 RX ADMIN — HYDROMORPHONE HYDROCHLORIDE 1 MG: 1 INJECTION, SOLUTION INTRAMUSCULAR; INTRAVENOUS; SUBCUTANEOUS at 13:22

## 2023-04-06 RX ADMIN — DIPHENHYDRAMINE HYDROCHLORIDE 25 MG: 25 CAPSULE ORAL at 11:12

## 2023-04-06 RX ADMIN — SODIUM CHLORIDE, POTASSIUM CHLORIDE, SODIUM LACTATE AND CALCIUM CHLORIDE 1000 ML: 600; 310; 30; 20 INJECTION, SOLUTION INTRAVENOUS at 11:11

## 2023-04-06 ASSESSMENT — PAIN SCALES - GENERAL: PAINLEVEL: EXTREME PAIN (9)

## 2023-04-06 NOTE — TELEPHONE ENCOUNTER
Oncology Nurse Triage - Sickle Cell Pain Crisis:    Situation: Jennifer  calling about Sickle Cell Pain Crisis, requesting to be added on for IV fluids and pain medicine    Background:     Patient's last infusion was 4/6/23  Last clinic visit date: 3/27/23 with Dr. Duncan  Does patient have active treatment plan?  Yes      Assessment of Symptoms:  Onset/Duration of symptoms: 4 day    Is it typical sickle cell pain? Yes   Location: all over  Character: Sharp           Intensity: 9/10    Any radiation of pain, numbness, tingling, weakness, warmth, swelling, discoloration of arms or legs?No     Fever?No  (if yes max temperature recorded in last 24 hours):      Chest Pain Present: No     Shortness of breath: No     Other home therapies tried: HEAT/HEATING PAD and WARM BATH     Last home medication taken and when: 0600 oxycodone    Any Refills Needed?: No     Does patient have transportation & length of time to get to clinic: No , needs transportation.       Grades for reference:       Grade 1 -   o Patient has active treatment plan.   o Patients last scheduled clinic appointment was attended  o Patient has not been seen in infusion or ED in the last 3 days (clarify exclusions in therapy plans)   o Afebrile   o Typical sickle cell pain   o Home medications have been tried   o No other symptoms     Recommendations:   Added to infusion waitlist per protocol.     BMT has opening at 1100. Pt will take this appt.   Scheduling notified to add pt to schedule. SW notified to coordinate rides.    Please note, if you are late for your appt, you risk losing your infusion appt as it may delay another patient's infusion who arrived on time.     Done

## 2023-04-06 NOTE — PROGRESS NOTES
Infusion Nursing Note:  Jennifer Cervantes presents today for IVF and Dilaudid with hx of SCD.    Patient seen by provider today: No   present during visit today: Not Applicable.    Note: VSS. Pt pain reported 9/10 on admission. Pt received 1L of Lactated Ringers infused over 2 hours. Pt received 1mg Dilaudid IVP every hour for a total of 3 times, administered by Jaida Kelly RN. Pt pain reported 4/10 on discharge.     Intravenous Access:  Implanted Port.    Treatment Conditions:  Not Applicable.    Post Infusion Assessment:  Patient tolerated infusion without incident.  Blood return noted pre and post infusion.  Site patent and intact, free from redness, edema or discomfort.  No evidence of extravasations.  Access discontinued per protocol.     Discharge Plan:   Discharge instructions reviewed with: Patient.  AVS to patient via [employer].    Patient discharged in stable condition accompanied by: self.  Departure Mode: Ambulatory.      Shelli Montague

## 2023-04-06 NOTE — PROGRESS NOTES
Social Work Note: Transportation  Oncology Clinic     Data/Intervention:  Patient Name:  Jennifer Cervantes  /Age: 2000, 23 years old     Call From: [facility]        Reason for Call:  Transportation     Assessment:   called [employer] to arrange ride through patient's insurance. [employer] arranged  for patient from home with [employer].  Patient will need to call when ready for return ride home ([phone]).      Plan:  Patient is aware of the transportation plan.  available to assist with any other needs.      CARLOS Chavez,[facility]  Hematology/Oncology Social Worker  Phone:[phone] Pager: [phone]

## 2023-04-10 ENCOUNTER — NURSE TRIAGE (OUTPATIENT)
Dept: ONCOLOGY | Facility: CLINIC | Age: 23
End: 2023-04-10
Payer: COMMERCIAL

## 2023-04-10 DIAGNOSIS — D57.00 SICKLE CELL PAIN CRISIS (H): ICD-10-CM

## 2023-04-10 RX ORDER — OXYCODONE HYDROCHLORIDE 15 MG/1
15 TABLET ORAL EVERY 4 HOURS PRN
Qty: 30 TABLET | Refills: 0 | Status: SHIPPED | OUTPATIENT
Start: 2023-04-10 | End: 2023-04-17

## 2023-04-10 NOTE — TELEPHONE ENCOUNTER
Narcotic Refill Request  Medication(s) requested:  Oxycodone 15mg tab  Person Requesting Refill: Jennifer  What pain is the medication treating: sickle cell pain  Does pt have enough for today? Pt not by the bottle, but thinks she has enough for today only, will be out after today  Is pain being adequately controlled on the current regimen?: yes  Experiencing any side effects from medication?: no    Date of most recent appointment:  3/27/23  Any No Show Visits: no  Next appointment:   4/14/23  Last fill date and by whom:  4/3/23 by Patricia Mantilla   Reviewed: no    Routed provider: Patricia Mantilla

## 2023-04-10 NOTE — TELEPHONE ENCOUNTER
Jennifer called to ask if there were any infusion openings yet.  Informed Jennifer that we do not have any openings currently.  We will call her when we hear back from infusion.  If hear back after 2 p.m. it is unlikely we will be able to offer apt today.  Pt voiced understanding.

## 2023-04-10 NOTE — TELEPHONE ENCOUNTER
Oncology Nurse Triage - Sickle Cell Pain Crisis:  Situation: Jennifer  calling about Sickle Cell Pain Crisis    Background:   Patient's last infusion was 4/6/23  Last clinic visit date: 3/27/23  Next follow-up appt on 4/14/23  Does patient have active treatment plan?  Yes    Assessment of Symptoms:  Onset/Duration of symptoms: 3 day    Is it typical sickle cell pain? Yes   Location: L side and back  Character: Sharp           Intensity: 9/10    Any radiation of pain, numbness, tingling, weakness, warmth, swelling, discoloration of extremities?No     Fever?No    Chest Pain Present: No     Shortness of breath: No     Other home therapies tried: HEAT/HEATING PAD     Last home medication taken and when: 0600 Oxycodone    Any Refills Needed?: Yes -oxycodone    Does patient have transportation & length of time to get to clinic: No - needs transportation    Grades for reference:       Grade 1 -   o Patient has active treatment plan.   o Patients last scheduled clinic appointment was attended  o Patient has not been seen in infusion or ED in the last 3 days (clarify exclusions in therapy plans)   o Afebrile   o Typically sickle cell pain   o Home medications have been tried   o No other symptoms     Recommendations:   Added to infusion wait list.     If you do not hear from the infusion center by 2pm then you will not be able to get in for an infusion today. If symptoms worsen while waiting for call back, and/or you experience fever, chills, SOB, chest pain, cough, n/v, dizziness, numbness, swelling, discoloration of extremities, then seek emergency evaluation in Emergency Department.     Please note, if you are late for your appt, you risk losing your infusion appt as it may delay another patient's infusion who arrived on time.

## 2023-04-11 ENCOUNTER — PATIENT OUTREACH (OUTPATIENT)
Dept: CARE COORDINATION | Facility: CLINIC | Age: 23
End: 2023-04-11
Payer: COMMERCIAL

## 2023-04-11 ENCOUNTER — INFUSION THERAPY VISIT (OUTPATIENT)
Dept: INFUSION THERAPY | Facility: CLINIC | Age: 23
End: 2023-04-11
Attending: PEDIATRICS
Payer: COMMERCIAL

## 2023-04-11 ENCOUNTER — NURSE TRIAGE (OUTPATIENT)
Dept: ONCOLOGY | Facility: CLINIC | Age: 23
End: 2023-04-11
Payer: COMMERCIAL

## 2023-04-11 VITALS
TEMPERATURE: 98.3 F | RESPIRATION RATE: 16 BRPM | OXYGEN SATURATION: 94 % | DIASTOLIC BLOOD PRESSURE: 69 MMHG | SYSTOLIC BLOOD PRESSURE: 111 MMHG | HEART RATE: 83 BPM

## 2023-04-11 DIAGNOSIS — D57.00 SICKLE CELL PAIN CRISIS (H): ICD-10-CM

## 2023-04-11 DIAGNOSIS — G81.10 SPASTIC HEMIPLEGIA, UNSPECIFIED ETIOLOGY, UNSPECIFIED LATERALITY (H): Primary | ICD-10-CM

## 2023-04-11 PROCEDURE — 250N000011 HC RX IP 250 OP 636: Performed by: PEDIATRICS

## 2023-04-11 PROCEDURE — 96361 HYDRATE IV INFUSION ADD-ON: CPT

## 2023-04-11 PROCEDURE — 258N000003 HC RX IP 258 OP 636: Performed by: PEDIATRICS

## 2023-04-11 PROCEDURE — 96374 THER/PROPH/DIAG INJ IV PUSH: CPT

## 2023-04-11 PROCEDURE — 250N000013 HC RX MED GY IP 250 OP 250 PS 637: Performed by: PEDIATRICS

## 2023-04-11 PROCEDURE — 96376 TX/PRO/DX INJ SAME DRUG ADON: CPT

## 2023-04-11 RX ORDER — ONDANSETRON 4 MG/1
8 TABLET, FILM COATED ORAL
Status: CANCELLED
Start: 2023-07-01

## 2023-04-11 RX ORDER — HEPARIN SODIUM,PORCINE 10 UNIT/ML
5 VIAL (ML) INTRAVENOUS
Status: CANCELLED | OUTPATIENT
Start: 2023-07-01

## 2023-04-11 RX ORDER — DIPHENHYDRAMINE HCL 25 MG
25 CAPSULE ORAL
Status: CANCELLED
Start: 2023-07-01

## 2023-04-11 RX ORDER — DIPHENHYDRAMINE HCL 25 MG
25 CAPSULE ORAL
Status: COMPLETED | OUTPATIENT
Start: 2023-04-11 | End: 2023-04-11

## 2023-04-11 RX ORDER — HEPARIN SODIUM (PORCINE) LOCK FLUSH IV SOLN 100 UNIT/ML 100 UNIT/ML
5 SOLUTION INTRAVENOUS
Status: DISCONTINUED | OUTPATIENT
Start: 2023-04-11 | End: 2023-04-11 | Stop reason: HOSPADM

## 2023-04-11 RX ORDER — HEPARIN SODIUM (PORCINE) LOCK FLUSH IV SOLN 100 UNIT/ML 100 UNIT/ML
5 SOLUTION INTRAVENOUS
Status: CANCELLED | OUTPATIENT
Start: 2023-07-01

## 2023-04-11 RX ADMIN — Medication 5 ML: at 13:17

## 2023-04-11 RX ADMIN — DIPHENHYDRAMINE HYDROCHLORIDE 25 MG: 25 CAPSULE ORAL at 11:02

## 2023-04-11 RX ADMIN — HYDROMORPHONE HYDROCHLORIDE 1 MG: 1 INJECTION, SOLUTION INTRAMUSCULAR; INTRAVENOUS; SUBCUTANEOUS at 13:13

## 2023-04-11 RX ADMIN — HYDROMORPHONE HYDROCHLORIDE 1 MG: 1 INJECTION, SOLUTION INTRAMUSCULAR; INTRAVENOUS; SUBCUTANEOUS at 12:06

## 2023-04-11 RX ADMIN — HYDROMORPHONE HYDROCHLORIDE 1 MG: 1 INJECTION, SOLUTION INTRAMUSCULAR; INTRAVENOUS; SUBCUTANEOUS at 11:06

## 2023-04-11 RX ADMIN — SODIUM CHLORIDE, POTASSIUM CHLORIDE, SODIUM LACTATE AND CALCIUM CHLORIDE 1000 ML: 600; 310; 30; 20 INJECTION, SOLUTION INTRAVENOUS at 11:03

## 2023-04-11 NOTE — PROGRESS NOTES
Social Work Note: Transportation  Oncology Clinic     Data/Intervention:  Patient Name:  Jennifer Cervantes  /Age: 2000, 23 years old     Call From: [facility]        Reason for Call:  Transportation     Assessment:   called [employer] to arrange ride through patient's insurance. [employer] arranged  for patient from home with [employer].  Patient will need to call when ready for return ride home ([phone]).      Plan:  Patient is aware of the transportation plan.  available to assist with any other needs.      CARLOS Chavez,[facility]  Hematology/Oncology Social Worker  Phone:[phone] Pager: [phone]

## 2023-04-11 NOTE — TELEPHONE ENCOUNTER
Oncology Nurse Triage - Sickle Cell Pain Crisis:  Situation: Jennifer  calling about Sickle Cell Pain Crisis     Background:   Patient's last infusion was 4/6/23  Last clinic visit date: 3/27/23  Next follow-up appt on 4/14/23  Does patient have active treatment plan?  Yes     Assessment of Symptoms:  Onset/Duration of symptoms: 4 day     Is it typical sickle cell pain? Yes   Location: L side and back  Character: Sharp           Intensity: 9/10     Any radiation of pain, numbness, tingling, weakness, warmth, swelling, discoloration of extremities? No      Fever? No     Chest Pain Present: No      Shortness of breath: No      Other home therapies tried: HEAT/HEATING PAD      Last home medication taken and when: 0600 Oxycodone     Any Refills Needed?: No      Does patient have transportation & length of time to get to clinic: No - needs transportation     Grades for reference:        Grade 1 -   ? Patient has active treatment plan.   ? Patients last scheduled clinic appointment was attended  ? Patient has not been seen in infusion or ED in the last 3 days (clarify exclusions in therapy plans)   ? Afebrile   ? Typically sickle cell pain   ? Home medications have been tried   ? No other symptoms      Recommendations:   Added to infusion wait list.     Per UDAY Fernando, they are able to coordinate a ride for today to get pt to infusion at 1030am. Per Cristela charge nurse, [facility] infusion has availability at 1030. Abdullahi to call pt to inform of appt and ride.   Message sent to scheduling at 0856.   0904 confirmed with pt she is aware of 1030 appt.     If you do not hear from the infusion center by 2pm then you will not be able to get in for an infusion today. If symptoms worsen while waiting for call back, and/or you experience fever, chills, SOB, chest pain, cough, n/v, dizziness, numbness, swelling, discoloration of extremities, then seek emergency evaluation in Emergency Department.      Please note, if you are late for your  appt, you risk losing your infusion appt as it may delay another patient's infusion who arrived on time.

## 2023-04-11 NOTE — PROGRESS NOTES
Nursing Note  Jennifer Cervantes presents today [facility] for:   Chief Complaint   Patient presents with     Infusion     IV Fluids/IV Dilaudid     During today's [facility] appointment, orders from Dr JAS Duncan were completed.  Frequency: as needed    Progress note:  Patient identification verified by name and date of birth.  Assessment completed.  Vitals recorded in Doc Flowsheets.  Patient was provided with education regarding medication/procedure and possible side effects.  Patient verbalized understanding.     present during visit today: Not Applicable.    Treatment Conditions: Non-applicable.    Premedications: were not ordered.    Drug Waste Record: No    Infusion length and rate:  infusion given over approximately 2 hours    Labs: were not ordered for this appointment.    Vascular access: port accessed today.    Is the next appt scheduled? NA    Post Infusion Assessment:  Patient tolerated infusion without incident.  Blood return noted pre and post infusion.  Site patent and intact, free from redness, edema or discomfort.  No evidence of extravasations.  Access discontinued per protocol.     Discharge Plan:   Follow up plan of care with: ordering provider as scheduled.  Discharge instructions were reviewed with patient.  Patient/representative verbalized understanding of discharge instructions and all questions answered.  Patient discharged from [facility] in stable condition.  Patient declined AVS.    Sahra Frankel RN    Administrations This Visit     diphenhydrAMINE (BENADRYL) capsule 25 mg     Admin Date  04/11/2023 Action  $Given Dose  25 mg Route  Oral Administered By  Sahra Frankel RN          heparin 100 UNIT/ML injection 5 mL     Admin Date  04/11/2023 Action  $Given Dose  5 mL Route  Intracatheter Administered By  Sahra Frankel RN          HYDROmorphone (DILAUDID) injection 1 mg      Admin Date  04/11/2023 Action  $Given Dose  1 mg Route  Intravenous Administered By  Sahra Frankel RN           Admin Date  04/11/2023 Action  $Given Dose  1 mg Route  Intravenous Administered By  Sahra Frankel RN           Admin Date  04/11/2023 Action  $Given Dose  1 mg Route  Intravenous Administered By  Sahra Frankel RN          lactated ringers BOLUS 1,000 mL     Admin Date  04/11/2023 Action  $New Bag Dose  1,000 mL Rate  500 mL/hr Route  Intravenous Administered By  Sahra Frankel RN                /74 (BP Location: Left arm, Patient Position: Semi-Short's, Cuff Size: Adult Regular)   Pulse 82   Temp 98.3  F (36.8  C) (Oral)   Resp 16   LMP 12/01/2019 (Approximate)   SpO2 94%

## 2023-04-13 ENCOUNTER — TELEPHONE (OUTPATIENT)
Dept: BEHAVIORAL HEALTH | Facility: CLINIC | Age: 23
End: 2023-04-13

## 2023-04-13 ENCOUNTER — PATIENT OUTREACH (OUTPATIENT)
Dept: CARE COORDINATION | Facility: CLINIC | Age: 23
End: 2023-04-13

## 2023-04-13 ENCOUNTER — INFUSION THERAPY VISIT (OUTPATIENT)
Dept: INFUSION THERAPY | Facility: CLINIC | Age: 23
End: 2023-04-13
Attending: PEDIATRICS
Payer: COMMERCIAL

## 2023-04-13 ENCOUNTER — NURSE TRIAGE (OUTPATIENT)
Dept: ONCOLOGY | Facility: CLINIC | Age: 23
End: 2023-04-13

## 2023-04-13 VITALS
DIASTOLIC BLOOD PRESSURE: 78 MMHG | RESPIRATION RATE: 16 BRPM | SYSTOLIC BLOOD PRESSURE: 120 MMHG | TEMPERATURE: 97.9 F | HEART RATE: 92 BPM | OXYGEN SATURATION: 97 %

## 2023-04-13 DIAGNOSIS — G81.10 SPASTIC HEMIPLEGIA, UNSPECIFIED ETIOLOGY, UNSPECIFIED LATERALITY (H): Primary | ICD-10-CM

## 2023-04-13 DIAGNOSIS — D57.00 SICKLE CELL PAIN CRISIS (H): ICD-10-CM

## 2023-04-13 PROCEDURE — 96374 THER/PROPH/DIAG INJ IV PUSH: CPT

## 2023-04-13 PROCEDURE — 250N000011 HC RX IP 250 OP 636: Performed by: PEDIATRICS

## 2023-04-13 PROCEDURE — 250N000013 HC RX MED GY IP 250 OP 250 PS 637: Performed by: PEDIATRICS

## 2023-04-13 PROCEDURE — 96376 TX/PRO/DX INJ SAME DRUG ADON: CPT

## 2023-04-13 PROCEDURE — 258N000003 HC RX IP 258 OP 636: Performed by: PEDIATRICS

## 2023-04-13 PROCEDURE — 96361 HYDRATE IV INFUSION ADD-ON: CPT

## 2023-04-13 RX ORDER — HEPARIN SODIUM,PORCINE 10 UNIT/ML
5 VIAL (ML) INTRAVENOUS
Status: CANCELLED | OUTPATIENT
Start: 2023-07-01

## 2023-04-13 RX ORDER — ONDANSETRON 4 MG/1
8 TABLET, FILM COATED ORAL
Status: CANCELLED
Start: 2023-07-01

## 2023-04-13 RX ORDER — HEPARIN SODIUM (PORCINE) LOCK FLUSH IV SOLN 100 UNIT/ML 100 UNIT/ML
5 SOLUTION INTRAVENOUS
Status: CANCELLED | OUTPATIENT
Start: 2023-07-01

## 2023-04-13 RX ORDER — DIPHENHYDRAMINE HCL 25 MG
25 CAPSULE ORAL
Status: COMPLETED | OUTPATIENT
Start: 2023-04-13 | End: 2023-04-13

## 2023-04-13 RX ORDER — HEPARIN SODIUM (PORCINE) LOCK FLUSH IV SOLN 100 UNIT/ML 100 UNIT/ML
5 SOLUTION INTRAVENOUS
Status: DISCONTINUED | OUTPATIENT
Start: 2023-04-13 | End: 2023-04-13 | Stop reason: HOSPADM

## 2023-04-13 RX ORDER — DIPHENHYDRAMINE HCL 25 MG
25 CAPSULE ORAL
Status: CANCELLED
Start: 2023-07-01

## 2023-04-13 RX ADMIN — HYDROMORPHONE HYDROCHLORIDE 1 MG: 1 INJECTION, SOLUTION INTRAMUSCULAR; INTRAVENOUS; SUBCUTANEOUS at 10:52

## 2023-04-13 RX ADMIN — DIPHENHYDRAMINE HYDROCHLORIDE 25 MG: 25 CAPSULE ORAL at 09:50

## 2023-04-13 RX ADMIN — HYDROMORPHONE HYDROCHLORIDE 1 MG: 1 INJECTION, SOLUTION INTRAMUSCULAR; INTRAVENOUS; SUBCUTANEOUS at 11:51

## 2023-04-13 RX ADMIN — HYDROMORPHONE HYDROCHLORIDE 1 MG: 1 INJECTION, SOLUTION INTRAMUSCULAR; INTRAVENOUS; SUBCUTANEOUS at 09:53

## 2023-04-13 RX ADMIN — SODIUM CHLORIDE, POTASSIUM CHLORIDE, SODIUM LACTATE AND CALCIUM CHLORIDE 1000 ML: 600; 310; 30; 20 INJECTION, SOLUTION INTRAVENOUS at 09:52

## 2023-04-13 RX ADMIN — Medication 5 ML: at 12:01

## 2023-04-13 NOTE — TELEPHONE ENCOUNTER
Oncology Nurse Triage - Sickle Cell Pain Crisis:    Situation: Jennifer  calling about Sickle Cell Pain Crisis    Background:     Patient's last infusion was 04/11/23  Last clinic visit date:03/27/23 w/  Next follow-up appt on 04/14/23 w/Patricia Mantilla  Does patient have active treatment plan?  Yes    (If pt has been seen in ED or infusion in last 3 days or no showed last clinic visit then does not meet protocol unless specified in pt therapy plan)    Assessment of Symptoms:  Onset/Duration of symptoms: 2 day    Is it typical sickle cell pain? Yes   Location: Back and thighs, left side  Character: Sharp           Intensity: 8/10    Any radiation of pain, numbness, tingling, weakness, warmth, swelling, discoloration of extremities?No     Fever?No  (if yes max temperature recorded in last 24 hours):      Chest Pain Present: No     Shortness of breath: No     Other home therapies tried: HEAT/HEATING PAD and WARM BATH     Last home medication taken and when: 0600 oxycodone    Any Refills Needed?: No     Does patient have transportation & length of time to get to clinic: No       Grades for reference:       Grade 1 -   o Patient has active treatment plan.   o Patients last scheduled clinic appointment was attended  o Patient has not been seen in infusion or ED in the last 3 days (clarify exclusions in therapy plans)   o Afebrile   o Typical sickle cell pain   o Home medications have been tried   o No other symptoms       Recommendations:   0726 Paged  for approval  0727  approved pt receiving IVF/Pain meds today  0817 Pt added for IVF/Pain meds with [facility] at 10am. SW coordinated pt transport,pt aware.        If you do not hear from the infusion center by 2pm then you will not be able to get in for an infusion today. If symptoms worsen while waiting for call back, and/or you experience fever, chills, SOB, chest pain, cough, n/v, dizziness, numbness, swelling, discoloration of extremities, then  seek emergency evaluation in Emergency Department.     Please note, if you are late for your appt, you risk losing your infusion appt as it may delay another patient's infusion who arrived on time.

## 2023-04-13 NOTE — PROGRESS NOTES
Social Work Note: Transportation  Oncology Clinic     Data/Intervention:  Patient Name:  Jennifer Cervantes  /Age: 2000, 23 years old     Call From: [facility]        Reason for Call:  Transportation     Assessment:   called [employer] to arrange ride through patient's insurance. [employer] arranged  for patient from home with [employer].  Patient will need to call when ready for return ride home ([phone]).      Plan:  Patient is aware of the transportation plan.  available to assist with any other needs.      CARLOS Chavez,[facility]  Hematology/Oncology Social Worker  Phone:[phone] Pager: [phone]

## 2023-04-13 NOTE — PROGRESS NOTES
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 She had multiple thromboembolic events in 2021 despite adherent anticoagulation use (though warfarin was perpetually low) and there are concerns for chronic thromboembolic disease but did not have pulmonary HTN on a November 2021 cath. She is maintained on chronic PO opioids and twice-weekly infusion visits (since 1/24/22) but has been able to be maintained on this regimen and has stayed out of the ED most of the time with even rarer admissions.     Interval History:  Jennifer is seen for routine follow-up today. Reports significant pain today. Last took oxycodone this morning before multiple clinic visits. Was in for fluids/IV analgesia Tuesday and Thursday this week. Does not feel her symptoms are severe enough to warrant an ED visit. Had a few jobs interview recently which did not pan out. Doesn't care to elaborate but feels okay about it. Desferal has been on hold. Remains on Jadenu and requests refills today. Overall feels positive about the oxycodone taper. Has been using only when needed at home, about 4 tablets/day due to available quantity.     Sickle Cell Disease Comprehensive Checklist    Bone Health/Avascular Necrosis Screening/Symptoms (each visit): no new concerns today    Leg Ulcer evaluation (every visit): none    Hypertension (every visit):stable 3/10/23    Last pulmonary evaluation (asthma, AMAN, pulm HTN): 9/28/22    Stroke/silent cerebral infarct Hx (Y/N): Yes TIA ~2014, first  "event ~age 1 with full stroke and R sided weakness    Last PCP Visit: 3/6/23    Vaccines:  ? PCV13: 5/13/19  ? Pneumovax (PPSV23): 3/04, 10/09, 7/12/19 (next due 7/2024)  ? Menactra: 4/2010, 9/2015 (MCV done 8/16/21)  ? Influenza: 11/17/2022     Audiology (chelation): done 6/2020, normal.. However, on 6/7, \"While there is no significant change in grade on the CTCAE 4.03 Scale, there were hearing changes bilaterally for both standard and extended high frequency audiometry.  Will need to determine if an ENT consult is advised due to the asymmetry in extended high frequency testing.\"     Plan last reviewed with patient: 7/11/22    Patient background: 21 yo F, enjoys movies and kids though there are times where she does not really want to talk to people. Does not have a lot of social support at home.     Sickle Cell Disease History  Primary Hematologist Team: Jose Rafael Duncan  PCP: none  Genotype: SS  Acute Pain Crisis Treatment: (avoiding IV opioids for now, which she has agreed to)    ER   o Oxycodone 15-20 mg x1  o Ketamine 4mg IV x 2--helps with opioid sparing  o Toradol 15 mg IV x1   o Maintenance IV fluids with LR  o Other: Zofran 8 mg IV PRN nausea    Inpatient:  o Home oxycodone/Oxycontin regimen, though home oxycodone dosing could be increased to 20 mg to start  o PCA plan:   - None for now  o Other Medications: Zofran  o She has had success with ketamine starting at 4mg/h and advancing only to 6mg/h, as 8mg/h made her feel quite poorly..  o ASA  o Supportive Care: Docusate, Senna  Chronic Pain Medications:    Home regimen Oxycontin 10 mg q12h, oxycodone 15 mg p.o. q.4-6 hours p.r.n. breakthrough pain.  She also continues on Voltaren gel, and Zoloft among other medications.    -Also benefits from mental health visits, acupuncture  Baseline Hemoglobin: 7 g/dl without chronic transfusions  Hydroxyurea use: Yes  H/O blood transfusions: Yes, several (iron overload) Most recent 11/20/2021    H/O Transfusion " Reactions: no    Antibodies:none  H/O Acute Chest Syndrome: Yes    Last episode:9/05/22 (previously 4/26/21, 10/2019)     ICU/intubation: not with 9/2022 admission  H/O Stroke: Yes (managed with chronic transfusions in the past, stopped late Spring 2020)  H/O VTE: Yes (2/2021)  H/O Cholecystectomy or Splenectomy: no  H/O Asthma, Pulm HTN, AVN, Leg Ulcers, Nephropathy, Retinopathy, etc: Iron overload, asthma, chronic lung disease, physical limitations from early stroke    ---------------------------------------  Jennifer Cervantes's Goals (last discussed 3/16/23, did not discuss today)    1-3 month goal:  Look for a job    6 month goal:      12 month goal:  Move into her own place     Disease-specific goal(s):  Continue to wean oxycodone down every 3-4 weeks  ---------------------------------------      Current Outpatient Medications   Medication Sig Dispense Refill     acetaminophen (TYLENOL) 325 MG tablet Take 2 tablets (650 mg) by mouth every 6 hours as needed for mild pain 120 tablet 3     albuterol (PROVENTIL) (2.5 MG/3ML) 0.083% neb solution Take 2 vials (5 mg) by nebulization every 6 hours as needed for shortness of breath / dyspnea or wheezing 90 mL 3     aspirin (ASA) 81 MG chewable tablet Take 1 tablet (81 mg) by mouth 2 times daily 60 tablet 11     budesonide-formoterol (SYMBICORT) 160-4.5 MCG/ACT Inhaler Inhale 2 puffs into the lungs 2 times daily 10.2 g 3     deferasirox (JADENU) 360 MG tablet Take 4 tablets (1,440 mg) by mouth every evening 120 tablet 4     diclofenac (VOLTAREN) 1 % topical gel Apply 4 g topically 4 times daily 350 g 0     FLUoxetine (PROZAC) 10 MG capsule Take 1 capsule (10 mg) by mouth daily For two weeks, then increase to 20 mg if 10 mg not effective 40 capsule 1     folic acid (FOLVITE) 1 MG tablet Take 1 tablet (1 mg) by mouth daily 30 tablet 0     Hydroxyurea 1000 MG TABS Take 3,000 mg by mouth daily 90 tablet 3     medroxyPROGESTERone (PROVERA) 2.5 MG tablet Take 1 tablet (2.5 mg) by  mouth daily for 60 days 60 tablet 0     ondansetron (ZOFRAN) 8 MG tablet Take 1 tablet (8 mg) by mouth every 8 hours as needed 30 tablet 1     oxyCODONE IR (ROXICODONE) 15 MG tablet Take 1 tablet (15 mg) by mouth every 4 hours as needed for severe pain Goal 4 per day. Max 6 per day. 30 tablet 0     traZODone (DESYREL) 50 MG tablet Take 1 tablet (50 mg) by mouth At Bedtime 30 tablet 1     EPINEPHrine (ANY BX GENERIC EQUIV) 0.3 MG/0.3ML injection 2-pack Inject 0.3 mLs (0.3 mg) into the muscle as needed for anaphylaxis (Patient not taking: Reported on 3/10/2023) 1 each 1     naloxone (NARCAN) 4 MG/0.1ML nasal spray Spray 4 mg into one nostril alternating nostrils as needed for opioid reversal every 2-3 minutes until assistance arrives (Patient not taking: Reported on 3/10/2023)         Past Medical History  Past Medical History:   Diagnosis Date     Anxiety      Bleeding disorder (H)      Blood clotting disorder (H)      Cerebral infarction (H) 2015     Cognitive developmental delay     low IQ. Please recognize when managing pain and planning with her     Depressive disorder      Hemiplegia and hemiparesis following cerebral infarction affecting right dominant side (H)     right hand contractures     Iron overload due to repeated red blood cell transfusions      Migraines      Multiple subsegmental pulmonary emboli without acute cor pulmonale (H) 02/01/2021     Oppositional defiant behavior      Presence of intrauterine contraceptive device 2/18/2020     Superficial venous thrombosis of arm, right 03/25/2021     Uncomplicated asthma      Past Surgical History:   Procedure Laterality Date     AS INSERT TUNNELED CV 2 CATH W/O PORT/PUMP       CHOLECYSTECTOMY       CV RIGHT HEART CATH MEASUREMENTS RECORDED N/A 11/18/2021    Procedure: Right Heart Cath;  Surgeon: Jackson Stauffer MD;  Location:  [facility]     INSERT PORT VASCULAR ACCESS Left 4/21/2021    Procedure: INSERTION, VASCULAR ACCESS PORT (NOT SURE  "ON SIDE UNTIL REMOVAL);  Surgeon: Rajan More MD;  Location: [facility] OR     IR CHEST PORT PLACEMENT > 5 YRS OF AGE  4/21/2021     IR CVC NON TUNNEL LINE REMOVAL  5/6/2021     IR CVC NON TUNNEL PLACEMENT > 5 YRS  04/07/2020     IR CVC NON TUNNEL PLACEMENT > 5 YRS  4/30/2021     IR CVC NON TUNNEL PLACEMENT > 5 YRS  9/7/2022     IR PORT REMOVAL LEFT  4/21/2021     REMOVE PORT VASCULAR ACCESS Left 4/21/2021    Procedure: REMOVAL, VASCULAR ACCESS PORT LEFT;  Surgeon: Rajan More MD;  Location: [facility] OR     REPAIR TENDON ELBOW Right 10/02/2019    Procedure: Right Elbow Flexor Lengthening, Flexor Pronator Slide Of Wrist and Finger, Thumb Adductor Lengthening;  Surgeon: Anai Franco MD;  Location: [facility]     TONSILLECTOMY Bilateral 10/02/2019    Procedure: Bilateral Tonsillectomy;  Surgeon: Farhana Guy MD;  Location: [facility] OR     ZZC BREAST REDUCTION (INCLUDES LIPO) TIER 3 Bilateral 04/23/2019     Allergies   Allergen Reactions     Contrast Dye      Hives and breathing issues     Fish-Derived Products Hives     Seafood Hives     Diagnostic X-Ray Materials      Gadolinium      Social History   Social History     Tobacco Use     Smoking status: Never     Smokeless tobacco: Never   Substance Use Topics     Alcohol use: Not Currently     Alcohol/week: 0.0 standard drinks of alcohol     Drug use: Never    Still living at home with mom and extended family.     Past medical history and social history were reviewed.    Physical Examination:  /78 (BP Location: Right arm, Patient Position: Sitting, Cuff Size: Adult Regular)   Pulse 98   Temp 98.3  F (36.8  C) (Oral)   Resp 16   Ht 1.626 m (5' 4.02\")   Wt 69.4 kg (153 lb 1.6 oz)   LMP 12/01/2019 (Approximate)   SpO2 95%   BMI 26.27 kg/m    Wt Readings from Last 10 Encounters:   04/14/23 69.4 kg (153 lb 1.6 oz)   03/27/23 70.8 kg (156 lb)   03/27/23 69.8 kg (153 lb 14.4 oz)   03/16/23 68.1 kg (150 lb 3.2 oz)   03/10/23 67.6 kg (149 lb)   03/06/23 " 69.1 kg (152 lb 4.8 oz)   02/02/23 70.3 kg (155 lb)   01/30/23 68.9 kg (152 lb)   01/26/23 68.9 kg (152 lb)   01/15/23 68.9 kg (151 lb 14.4 oz)     General: Pleasant female, tearful and in apparent discomfort, fidgeting  Eyes: EOMI, PERRL. Mild. scleral icterus.  Respiratory: Normal respiratory effort.  MSK: Contractured right arm and hand 2/2 stroke.  Neurologic: Grossly nonfocal. A/O x 4.  Skin: No rashes, petechiae, or bruising noted on exposed skin.    Laboratory Data:  Most Recent 3 CBC's:  Recent Labs   Lab Test 04/14/23  1059 03/27/23 2136 03/27/23  1136   WBC 12.9* 16.2* 12.3*   HGB 7.1* 7.6* 7.9*   MCV 87 89 87    443 475*   ANEUTAUTO 9.7* 12.0* 9.0*    Most Recent 3 BMP's:  Recent Labs   Lab Test 04/14/23  1059 03/27/23 2136 03/27/23  1136    138 140   POTASSIUM 3.3* 3.7 4.0   CHLORIDE 109* 107 109*   CO2 21* 18* 19*   BUN 4.9* 8.0 10.4   CR 0.46* 0.50* 0.51   ANIONGAP 9 13 12   MICAH 8.9 8.9 8.9   * 89 93   PROTTOTAL 7.1 7.5 7.5   ALBUMIN 4.1 4.3 4.3    Most Recent 2 LFT's:  Recent Labs   Lab Test 04/14/23  1059 03/27/23 2136   AST 38* 42*   ALT 18 20   ALKPHOS 65 73   BILITOTAL 4.0* 4.5*      Lab Results   Component Value Date    RETP 22.7 (H) 04/14/2023      Assessment and Plan:  1. Sickle Cell HgbSS Disease  2. Recent hospitalization with acute chest, pulmonary edema (1/2023)  3. Chronic Pain  4. 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 History of CVA  7. Hearing loss    Jennifer continue to adhere to twice weekly infusion visits which we will continue at this time. She inquires about getting in for infusion today which cannot be accommodated due to scheduling aside from our plan to limit to only 2 infusions per week. Our visit was rather limited due to her pain and desire to leave clinic. Denies any recurrent shortness of breath or cough recently. Labs today reviewed revealing hgb 7.1, WBC 12.9., slightly decrease in potassium to  3.3.     Desferal is currently on hold due to desire to stop/hold infusion due to decreased quality of life related to the amount of time she needed to be connected for infusion. Remains on Jadenu. A repeat Ferriscan was previously requested by Dr. Duncan but not yet scheduled.    Last oxycodone prescription was further tapered to 30 tablet per fill on 3/13/23. We discussed today the plan to taper further to 25 tablets per fill with next refill anticipated 4/17/23.     Plan:  -Continue Hydrea to 3000mg daily to help lessen frequency of sickle cell pain. Refilled today.  -Continue slow taper of oxycodone, currently at 30 tablets per refill with plan to decrease to 25 tablets per fill next on 4/17/23. She can continue the frequency at every 4 hours with a max of 6 tablets per day but with the decreased tablets per refill should aim for a max of 4 tablets per day. She can self-reduce infusion days/week and we will continue to keep the cap at 2/week for now.  -Continue infusion center visits limited to two times per week (Mondays and Fridays). Continue diligent home management with current medications, heat, rest, compression, warm baths.   -If unable to manage at home can go to ED but continue to not do IV narcotics in the emergency room. Ketamine previously added to pain plan in ED  - Desferal infusions on hold. Continue Jadenu and check Ferriscan. Will likely need to resume infusions in the future.   -Continue aspirin BID  -RTC with me 5/8    30 minutes spent on the date of the encounter doing chart review, review of test results, interpretation of tests, patient visit and documentation       Patricia Mantilla CNP  [facility]  [address] [place] [id number]  [phone]

## 2023-04-13 NOTE — PROGRESS NOTES
Infusion Nursing Note:  Jennifer Cervantes presents today for   Chief Complaint   Patient presents with     Infusion     IV fluids, analgesics   Patient seen by provider today: No   present during visit today: Not Applicable.    Note: 1L LR infused over ~2 hours. IV dlaudid administered x3, pain at 4/10 upon discharge. PO benadryl administered x1.    Intravenous Access:  Implanted Port.    Treatment Conditions:  Not Applicable.    Post Infusion Assessment:  Patient tolerated infusion without incident.  Blood return noted pre and post infusion.  Site patent and intact, free from redness, edema or discomfort.  No evidence of extravasations.  Access discontinued per protocol.     Discharge Plan:   Discharge instructions reviewed with: Patient.  Patient and/or family verbalized understanding of discharge instructions and all questions answered.  AVS to patient via [facility].  Patient will return as needed for next appointment.   Patient discharged in stable condition accompanied by: self.  Departure Mode: Ambulatory.    Administrations This Visit     diphenhydrAMINE (BENADRYL) capsule 25 mg     Admin Date  04/13/2023 Action  $Given Dose  25 mg Route  Oral Administered By  Ashley Loamx RN          heparin 100 UNIT/ML injection 5 mL     Admin Date  04/13/2023 Action  $Given Dose  5 mL Route  Intracatheter Administered By  Ashley Lomax RN          HYDROmorphone (DILAUDID) injection 1 mg     Admin Date  04/13/2023 Action  $Given Dose  1 mg Route  Intravenous Administered By  Ashley Lomax RN           Admin Date  04/13/2023 Action  $Given Dose  1 mg Route  Intravenous Administered By  Ashley Lomax RN           Admin Date  04/13/2023 Action  $Given Dose  1 mg Route  Intravenous Administered By  Ashley Lomax RN          lactated ringers BOLUS 1,000 mL     Admin Date  04/13/2023 Action  $New Bag Dose  1,000 mL Rate  500 mL/hr Route  Intravenous Administered By  Dami  Ashley CID RN              /82   Pulse 87   Temp 97.9  F (36.6  C) (Oral)   Resp 16   LMP 12/01/2019 (Approximate)   SpO2 97%     ASHLEY LATIF RN

## 2023-04-13 NOTE — PATIENT INSTRUCTIONS
Dear Jennifer,    Thank you for choosing [facility] ([facility]) for your infusion.  The following information is a summary of our appointment as well as important reminders.      We look forward in seeing you on your next appointment here at [facility]).  Please don t hesitate to call us at [phone] to reschedule any of your appointments or to speak with one of the [facility] registered nurses.  It was a pleasure taking care of you today.    Sincerely,    [facility]  [address] [place] [address]  [id number]  Phone:  [phone]

## 2023-04-13 NOTE — TELEPHONE ENCOUNTER
This writer spoke with the patient regarding her 7pm appointment she had scheduled with the TC on 4/13/23. This writer informed the patient her provider is out of the office unexpectedly and offered to reschedule with another provider or to look for appointments that can provide long term therapy. Patient declined to be scheduled with another provider, and reports she will wait until July. This writer provided the patient with the direct phone number to call back if she decides she would like to be seen sooner.     Julieta Tyler  4/13/23  3:47pm

## 2023-04-14 ENCOUNTER — ONCOLOGY VISIT (OUTPATIENT)
Dept: ONCOLOGY | Facility: CLINIC | Age: 23
End: 2023-04-14
Attending: REGISTERED NURSE
Payer: COMMERCIAL

## 2023-04-14 ENCOUNTER — TELEPHONE (OUTPATIENT)
Dept: ONCOLOGY | Facility: CLINIC | Age: 23
End: 2023-04-14

## 2023-04-14 ENCOUNTER — TELEPHONE (OUTPATIENT)
Dept: BEHAVIORAL HEALTH | Facility: CLINIC | Age: 23
End: 2023-04-14

## 2023-04-14 ENCOUNTER — THERAPY VISIT (OUTPATIENT)
Dept: OCCUPATIONAL THERAPY | Facility: CLINIC | Age: 23
End: 2023-04-14
Payer: COMMERCIAL

## 2023-04-14 ENCOUNTER — APPOINTMENT (OUTPATIENT)
Dept: LAB | Facility: CLINIC | Age: 23
End: 2023-04-14
Attending: REGISTERED NURSE
Payer: COMMERCIAL

## 2023-04-14 VITALS
WEIGHT: 153.1 LBS | OXYGEN SATURATION: 95 % | DIASTOLIC BLOOD PRESSURE: 78 MMHG | TEMPERATURE: 98.3 F | HEART RATE: 98 BPM | BODY MASS INDEX: 26.14 KG/M2 | SYSTOLIC BLOOD PRESSURE: 134 MMHG | HEIGHT: 64 IN | RESPIRATION RATE: 16 BRPM

## 2023-04-14 DIAGNOSIS — D57.00 SICKLE CELL PAIN CRISIS (H): ICD-10-CM

## 2023-04-14 DIAGNOSIS — I82.611 SUPERFICIAL VENOUS THROMBOSIS OF ARM, RIGHT: ICD-10-CM

## 2023-04-14 DIAGNOSIS — I69.351 HEMIPLEGIA AND HEMIPARESIS FOLLOWING CEREBRAL INFARCTION AFFECTING RIGHT DOMINANT SIDE (H): Primary | ICD-10-CM

## 2023-04-14 DIAGNOSIS — E83.111 IRON OVERLOAD DUE TO REPEATED RED BLOOD CELL TRANSFUSIONS: ICD-10-CM

## 2023-04-14 DIAGNOSIS — D57.1 HB-SS DISEASE WITHOUT CRISIS (H): Primary | ICD-10-CM

## 2023-04-14 DIAGNOSIS — R25.2 SPASTICITY: ICD-10-CM

## 2023-04-14 LAB
ALBUMIN SERPL BCG-MCNC: 4.1 G/DL (ref 3.5–5.2)
ALP SERPL-CCNC: 65 U/L (ref 35–104)
ALT SERPL W P-5'-P-CCNC: 18 U/L (ref 10–35)
ANION GAP SERPL CALCULATED.3IONS-SCNC: 9 MMOL/L (ref 7–15)
AST SERPL W P-5'-P-CCNC: 38 U/L (ref 10–35)
BASOPHILS # BLD AUTO: 0.2 10E3/UL (ref 0–0.2)
BASOPHILS NFR BLD AUTO: 2 %
BILIRUB SERPL-MCNC: 4 MG/DL
BUN SERPL-MCNC: 4.9 MG/DL (ref 6–20)
CALCIUM SERPL-MCNC: 8.9 MG/DL (ref 8.6–10)
CHLORIDE SERPL-SCNC: 109 MMOL/L (ref 98–107)
CREAT SERPL-MCNC: 0.46 MG/DL (ref 0.51–0.95)
DEPRECATED HCO3 PLAS-SCNC: 21 MMOL/L (ref 22–29)
EOSINOPHIL # BLD AUTO: 0.4 10E3/UL (ref 0–0.7)
EOSINOPHIL NFR BLD AUTO: 3 %
ERYTHROCYTE [DISTWIDTH] IN BLOOD BY AUTOMATED COUNT: 25.3 % (ref 10–15)
FERRITIN SERPL-MCNC: 5877 NG/ML (ref 6–175)
GFR SERPL CREATININE-BSD FRML MDRD: >90 ML/MIN/1.73M2
GLUCOSE SERPL-MCNC: 104 MG/DL (ref 70–99)
HCT VFR BLD AUTO: 20 % (ref 35–47)
HGB BLD-MCNC: 7.1 G/DL (ref 11.7–15.7)
IMM GRANULOCYTES # BLD: 0.1 10E3/UL
IMM GRANULOCYTES NFR BLD: 1 %
LYMPHOCYTES # BLD AUTO: 1.6 10E3/UL (ref 0.8–5.3)
LYMPHOCYTES NFR BLD AUTO: 13 %
MCH RBC QN AUTO: 30.7 PG (ref 26.5–33)
MCHC RBC AUTO-ENTMCNC: 35.5 G/DL (ref 31.5–36.5)
MCV RBC AUTO: 87 FL (ref 78–100)
MONOCYTES # BLD AUTO: 1 10E3/UL (ref 0–1.3)
MONOCYTES NFR BLD AUTO: 7 %
NEUTROPHILS # BLD AUTO: 9.7 10E3/UL (ref 1.6–8.3)
NEUTROPHILS NFR BLD AUTO: 74 %
NRBC # BLD AUTO: 0.5 10E3/UL
NRBC BLD AUTO-RTO: 4 /100
PLATELET # BLD AUTO: 449 10E3/UL (ref 150–450)
POTASSIUM SERPL-SCNC: 3.3 MMOL/L (ref 3.4–5.3)
PROT SERPL-MCNC: 7.1 G/DL (ref 6.4–8.3)
RBC # BLD AUTO: 2.31 10E6/UL (ref 3.8–5.2)
RETICS # AUTO: 0.51 10E6/UL (ref 0.03–0.1)
RETICS/RBC NFR AUTO: 22.7 % (ref 0.5–2)
SODIUM SERPL-SCNC: 139 MMOL/L (ref 136–145)
WBC # BLD AUTO: 12.9 10E3/UL (ref 4–11)

## 2023-04-14 PROCEDURE — 36591 DRAW BLOOD OFF VENOUS DEVICE: CPT | Performed by: REGISTERED NURSE

## 2023-04-14 PROCEDURE — 85045 AUTOMATED RETICULOCYTE COUNT: CPT | Performed by: REGISTERED NURSE

## 2023-04-14 PROCEDURE — 97140 MANUAL THERAPY 1/> REGIONS: CPT | Mod: GO

## 2023-04-14 PROCEDURE — 99214 OFFICE O/P EST MOD 30 MIN: CPT | Performed by: REGISTERED NURSE

## 2023-04-14 PROCEDURE — 97763 ORTHC/PROSTC MGMT SBSQ ENC: CPT | Mod: GO

## 2023-04-14 PROCEDURE — 83021 HEMOGLOBIN CHROMOTOGRAPHY: CPT | Performed by: REGISTERED NURSE

## 2023-04-14 PROCEDURE — 82728 ASSAY OF FERRITIN: CPT | Performed by: REGISTERED NURSE

## 2023-04-14 PROCEDURE — G0463 HOSPITAL OUTPT CLINIC VISIT: HCPCS | Performed by: REGISTERED NURSE

## 2023-04-14 PROCEDURE — 250N000011 HC RX IP 250 OP 636: Performed by: REGISTERED NURSE

## 2023-04-14 PROCEDURE — 85025 COMPLETE CBC W/AUTO DIFF WBC: CPT | Performed by: REGISTERED NURSE

## 2023-04-14 PROCEDURE — 80053 COMPREHEN METABOLIC PANEL: CPT | Performed by: REGISTERED NURSE

## 2023-04-14 RX ORDER — DEFERASIROX 360 MG/1
1440 TABLET, FILM COATED ORAL EVERY EVENING
Qty: 120 TABLET | Refills: 4 | Status: SHIPPED | OUTPATIENT
Start: 2023-04-14 | End: 2023-05-08

## 2023-04-14 RX ORDER — ASPIRIN 81 MG/1
81 TABLET, CHEWABLE ORAL 2 TIMES DAILY
Qty: 60 TABLET | Refills: 11 | Status: SHIPPED | OUTPATIENT
Start: 2023-04-14 | End: 2023-05-08

## 2023-04-14 RX ORDER — HEPARIN SODIUM (PORCINE) LOCK FLUSH IV SOLN 100 UNIT/ML 100 UNIT/ML
5 SOLUTION INTRAVENOUS ONCE
Status: COMPLETED | OUTPATIENT
Start: 2023-04-14 | End: 2023-04-14

## 2023-04-14 RX ADMIN — Medication 5 ML: at 10:53

## 2023-04-14 ASSESSMENT — PAIN SCALES - GENERAL: PAINLEVEL: EXTREME PAIN (9)

## 2023-04-14 NOTE — NURSING NOTE
"Chief Complaint   Patient presents with     Port Draw     Labs drawn via port by RN. Vitals taken.     Labs drawn via port by RN. Port accessed with 20G 3/4\" power needle. Flushed with NS and heparin. De-accessed. Pt tolerated well. Vitals taken. Pt checked in for next appointment.    Rin Mckeon, RN  "

## 2023-04-14 NOTE — TELEPHONE ENCOUNTER
Jennifer is calling to request a message be sent to her Care Team. She wants to know if she can try the SERA again.  Pt is requesting a call back to discuss.    Message forwarded to Dr. Duncan, Patricia Mantilla CNP and Arely Krueger RNCC

## 2023-04-14 NOTE — NURSING NOTE
"Oncology Rooming Note    April 14, 2023 11:46 AM   Jennifer Cervantes is a 23 year old female who presents for:    Chief Complaint   Patient presents with     Port Draw     Labs drawn via port by RN. Vitals taken.     Oncology Clinic Visit     [facility] - SICKLE CELL ANEMIA     Initial Vitals: /78 (BP Location: Right arm, Patient Position: Sitting, Cuff Size: Adult Regular)   Pulse 98   Temp 98.3  F (36.8  C) (Oral)   Resp 16   Ht 1.626 m (5' 4.02\")   Wt 69.4 kg (153 lb 1.6 oz)   LMP 12/01/2019 (Approximate)   SpO2 95%   BMI 26.27 kg/m   Estimated body mass index is 26.27 kg/m  as calculated from the following:    Height as of this encounter: 1.626 m (5' 4.02\").    Weight as of this encounter: 69.4 kg (153 lb 1.6 oz). Body surface area is 1.77 meters squared.  Extreme Pain (9) Comment: Data Unavailable   Patient's last menstrual period was 12/01/2019 (approximate).  Allergies reviewed: Yes  Medications reviewed: Yes    Medications: MEDICATION REFILLS NEEDED TODAY. Provider was notified.  Pharmacy name entered into [facility]: [facility] - [place], [place] - [id number]    Refill on Hydrea, Aspirin, and Jadenu.    Shahriar Sheldon LPN            "

## 2023-04-14 NOTE — Clinical Note
4/14/2023         RE: Jennifer Cervantes  [address]  [facility] [address]        Dear Colleague,    Thank you for referring your patient, Jennifer Cervantes, to the [facility]. Please see a copy of my visit note below.    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 She had multiple thromboembolic events in 2021 despite adherent anticoagulation use (though warfarin was perpetually low) and there are concerns for chronic thromboembolic disease but did not have pulmonary HTN on a November 2021 cath. She is maintained on chronic PO opioids and twice-weekly infusion visits (since 1/24/22) but has been able to be maintained on this regimen and has stayed out of the ED most of the time with even rarer admissions.     Interval History:  Jennifer is seen back today for short interval follow-up per her request. She has had a persistent cough this week without other symptoms. Over the weekend she had an isolated temperature of 101. She had viral cold symptoms 3 weeks ago which have resolved. The cough started after resolution of these other symptoms. She has been laying low at home in hopes to avoid a hospital admission and also due to some conflict with her family and significant other. She reports diffuse pain, most severe in her left lower back today, and requests to receive IVF and pain medication in the infusion center if possible.     Sickle Cell Disease Comprehensive Checklist    Bone Health/Avascular Necrosis  "Screening/Symptoms (each visit): no new concerns today    Leg Ulcer evaluation (every visit): none    Hypertension (every visit):stable 3/10/23    Last pulmonary evaluation (asthma, AMAN, pulm HTN): 9/28/22    Stroke/silent cerebral infarct Hx (Y/N): Yes TIA ~2014, first event ~age 1 with full stroke and R sided weakness    Last PCP Visit: 3/6/23    Vaccines:  ? PCV13: 5/13/19  ? Pneumovax (PPSV23): 3/04, 10/09, 7/12/19 (next due 7/2024)  ? Menactra: 4/2010, 9/2015 (MCV done 8/16/21)  ? Influenza: 11/17/2022     Audiology (chelation): done 6/2020, normal.. However, on 6/7, \"While there is no significant change in grade on the CTCAE 4.03 Scale, there were hearing changes bilaterally for both standard and extended high frequency audiometry.  Will need to determine if an ENT consult is advised due to the asymmetry in extended high frequency testing.\"     Plan last reviewed with patient: 7/11/22    Patient background: 21 yo F, enjoys movies and kids though there are times where she does not really want to talk to people. Does not have a lot of social support at home.     Sickle Cell Disease History  Primary Hematologist Team: Jose Rafael Duncan  PCP: none  Genotype: SS  Acute Pain Crisis Treatment: (avoiding IV opioids for now, which she has agreed to)    ER   o Oxycodone 15-20 mg x1  o Ketamine 4mg IV x 2--helps with opioid sparing  o Toradol 15 mg IV x1   o Maintenance IV fluids with LR  o Other: Zofran 8 mg IV PRN nausea    Inpatient:  o Home oxycodone/Oxycontin regimen, though home oxycodone dosing could be increased to 20 mg to start  o PCA plan:   - None for now  o Other Medications: Zofran  o She has had success with ketamine starting at 4mg/h and advancing only to 6mg/h, as 8mg/h made her feel quite poorly..  o ASA  o Supportive Care: Docusate, Senna  Chronic Pain Medications:    Home regimen Oxycontin 10 mg q12h, oxycodone 15 mg p.o. q.4-6 hours p.r.n. breakthrough pain.  She also continues on Voltaren gel, and " Zoloft among other medications.    -Also benefits from mental health visits, acupuncture  Baseline Hemoglobin: 7 g/dl without chronic transfusions  Hydroxyurea use: Yes  H/O blood transfusions: Yes, several (iron overload) Most recent 11/20/2021    H/O Transfusion Reactions: no    Antibodies:none  H/O Acute Chest Syndrome: Yes    Last episode:9/05/22 (previously 4/26/21, 10/2019)     ICU/intubation: not with 9/2022 admission  H/O Stroke: Yes (managed with chronic transfusions in the past, stopped late Spring 2020)  H/O VTE: Yes (2/2021)  H/O Cholecystectomy or Splenectomy: no  H/O Asthma, Pulm HTN, AVN, Leg Ulcers, Nephropathy, Retinopathy, etc: Iron overload, asthma, chronic lung disease, physical limitations from early stroke    ---------------------------------------  Jennifer Cervantes's Goals (discussed toady 3/16/23)    1-3 month goal:  Look for a job    6 month goal:      12 month goal:  Move into her own place     Disease-specific goal(s):  Continue to wean oxycodone down every 3-4 weeks  ---------------------------------------      Current Outpatient Medications   Medication Sig Dispense Refill     acetaminophen (TYLENOL) 325 MG tablet Take 2 tablets (650 mg) by mouth every 6 hours as needed for mild pain 120 tablet 3     albuterol (PROVENTIL) (2.5 MG/3ML) 0.083% neb solution Take 2 vials (5 mg) by nebulization every 6 hours as needed for shortness of breath / dyspnea or wheezing 90 mL 3     aspirin (ASA) 81 MG chewable tablet Take 1 tablet (81 mg) by mouth 2 times daily 60 tablet 11     budesonide-formoterol (SYMBICORT) 160-4.5 MCG/ACT Inhaler Inhale 2 puffs into the lungs 2 times daily 10.2 g 3     deferasirox (JADENU) 360 MG tablet Take 4 tablets (1,440 mg) by mouth every evening 120 tablet 4     diclofenac (VOLTAREN) 1 % topical gel Apply 4 g topically 4 times daily 350 g 0     EPINEPHrine (ANY BX GENERIC EQUIV) 0.3 MG/0.3ML injection 2-pack Inject 0.3 mLs (0.3 mg) into the muscle as needed for anaphylaxis  (Patient not taking: Reported on 3/10/2023) 1 each 1     FLUoxetine (PROZAC) 10 MG capsule Take 1 capsule (10 mg) by mouth daily For two weeks, then increase to 20 mg if 10 mg not effective 40 capsule 1     folic acid (FOLVITE) 1 MG tablet Take 1 tablet (1 mg) by mouth daily 30 tablet 0     Hydroxyurea 1000 MG TABS Take 3,000 mg by mouth daily 90 tablet 3     medroxyPROGESTERone (PROVERA) 2.5 MG tablet Take 1 tablet (2.5 mg) by mouth daily for 60 days 60 tablet 0     naloxone (NARCAN) 4 MG/0.1ML nasal spray Spray 4 mg into one nostril alternating nostrils as needed for opioid reversal every 2-3 minutes until assistance arrives (Patient not taking: Reported on 3/10/2023)       ondansetron (ZOFRAN) 8 MG tablet Take 1 tablet (8 mg) by mouth every 8 hours as needed 30 tablet 1     oxyCODONE IR (ROXICODONE) 15 MG tablet Take 1 tablet (15 mg) by mouth every 4 hours as needed for severe pain Goal 4 per day. Max 6 per day. 30 tablet 0     traZODone (DESYREL) 50 MG tablet Take 1 tablet (50 mg) by mouth At Bedtime 30 tablet 1       Past Medical History  Past Medical History:   Diagnosis Date     Anxiety      Bleeding disorder (H)      Blood clotting disorder (H)      Cerebral infarction (H) 2015     Cognitive developmental delay     low IQ. Please recognize when managing pain and planning with her     Depressive disorder      Hemiplegia and hemiparesis following cerebral infarction affecting right dominant side (H)     right hand contractures     Iron overload due to repeated red blood cell transfusions      Migraines      Multiple subsegmental pulmonary emboli without acute cor pulmonale (H) 02/01/2021     Oppositional defiant behavior      Presence of intrauterine contraceptive device 2/18/2020     Superficial venous thrombosis of arm, right 03/25/2021     Uncomplicated asthma      Past Surgical History:   Procedure Laterality Date     AS INSERT TUNNELED CV 2 CATH W/O PORT/PUMP       CHOLECYSTECTOMY       CV RIGHT HEART  CATH MEASUREMENTS RECORDED N/A 11/18/2021    Procedure: Right Heart Cath;  Surgeon: Jackson Stauffer MD;  Location:  HEART CARDIAC CATH LAB     INSERT PORT VASCULAR ACCESS Left 4/21/2021    Procedure: INSERTION, VASCULAR ACCESS PORT (NOT SURE ON SIDE UNTIL REMOVAL);  Surgeon: Rajan More MD;  Location: [facility] OR     IR CHEST PORT PLACEMENT > 5 YRS OF AGE  4/21/2021     IR CVC NON TUNNEL LINE REMOVAL  5/6/2021     IR CVC NON TUNNEL PLACEMENT > 5 YRS  04/07/2020     IR CVC NON TUNNEL PLACEMENT > 5 YRS  4/30/2021     IR CVC NON TUNNEL PLACEMENT > 5 YRS  9/7/2022     IR PORT REMOVAL LEFT  4/21/2021     REMOVE PORT VASCULAR ACCESS Left 4/21/2021    Procedure: REMOVAL, VASCULAR ACCESS PORT LEFT;  Surgeon: Rajan More MD;  Location: [facility] OR     REPAIR TENDON ELBOW Right 10/02/2019    Procedure: Right Elbow Flexor Lengthening, Flexor Pronator Slide Of Wrist and Finger, Thumb Adductor Lengthening;  Surgeon: Anai Franco MD;  Location: [facility]     TONSILLECTOMY Bilateral 10/02/2019    Procedure: Bilateral Tonsillectomy;  Surgeon: Farhana Guy MD;  Location: [facility]     ZZC BREAST REDUCTION (INCLUDES LIPO) TIER 3 Bilateral 04/23/2019     Allergies   Allergen Reactions     Contrast Dye      Hives and breathing issues     Fish-Derived Products Hives     Seafood Hives     Diagnostic X-Ray Materials      Gadolinium      Social History   Social History     Tobacco Use     Smoking status: Never     Smokeless tobacco: Never   Substance Use Topics     Alcohol use: Not Currently     Alcohol/week: 0.0 standard drinks of alcohol     Drug use: Never    Still living at home with mom and extended family.     Past medical history and social history were reviewed.    Physical Examination:  LMP 12/01/2019 (Approximate)   Wt Readings from Last 10 Encounters:   03/27/23 70.8 kg (156 lb)   03/27/23 69.8 kg (153 lb 14.4 oz)   03/16/23 68.1 kg (150 lb 3.2 oz)   03/10/23 67.6 kg (149 lb)   03/06/23 69.1 kg (152 lb 4.8  oz)   02/02/23 70.3 kg (155 lb)   01/30/23 68.9 kg (152 lb)   01/26/23 68.9 kg (152 lb)   01/15/23 68.9 kg (151 lb 14.4 oz)   01/02/23 74.3 kg (163 lb 12.8 oz)     General: Well-appearing female, NAD  Eyes: EOMI, PERRL. No scleral icterus.  Cardiovascular: RRR No murmurs, gallops, or rubs. No peripheral edema.  Respiratory: CTA bilaterally. No wheezes or crackles.  MSK: Contractured right arm and hand 2/2 stroke.  Neurologic: Grossly nonfocal. A/O x 4.  Skin: No rashes, petechiae, or bruising noted on exposed skin.    Laboratory Data:  Most Recent 3 CBC's:  Recent Labs   Lab Test 03/27/23 2136 03/27/23 1136 03/20/23  1010   WBC 16.2* 12.3* 12.7*   HGB 7.6* 7.9* 8.0*   MCV 89 87 85    475* 468*   ANEUTAUTO 12.0* 9.0* 8.9*    Most Recent 3 BMP's:  Recent Labs   Lab Test 03/27/23 2136 03/27/23 1136 03/20/23  1010    140 137   POTASSIUM 3.7 4.0 3.7   CHLORIDE 107 109* 104   CO2 18* 19* 20*   BUN 8.0 10.4 8.8   CR 0.50* 0.51 0.54   ANIONGAP 13 12 13   MICAH 8.9 8.9 9.2   GLC 89 93 84   PROTTOTAL 7.5 7.5 8.1   ALBUMIN 4.3 4.3 4.8    Most Recent 2 LFT's:  Recent Labs   Lab Test 03/27/23 2136 03/27/23 1136 03/20/23  1010   AST 42*  --  46*   ALT 20 18 32   ALKPHOS 73 77 96   BILITOTAL 4.5* 3.3* 3.8*      Lab Results   Component Value Date    RETP 19.9 (H) 03/27/2023        Narrative & Impression   Exam: XR CHEST 2 VIEWS, 3/16/2023 7:45 AM     Comparison: 1/12/2023, 1/11/2023     History: Acute cough     Findings:  Left Port-A-Cath tip projects over the cavoatrial junction.  Cholecystectomy clips in the upper abdomen.     Cardiac silhouette is within normal limits. No pneumothorax. Blunting  of the left costophrenic angle. Mild bibasilar streaky opacities. No  acute osseous abnormality.                                                                      Impression:   1. Small left pleural effusion.  2. Mild bibasilar streaky opacities, likely atelectasis and/or edema.     I have personally reviewed the  examination and initial interpretation  and I agree with the findings.     MANSI GAFFNEY MD        Most recent labs and imaging reviewed and interpreted by me today.    Assessment and Plan:  1. Sickle Cell HgbSS Disease  2. Recent hospitalization with acute chest, pulmonary edema (1/2023)  3. Chronic Pain  4. 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 History of CVA  7. Hearing loss    Jennifer continue to adhere to twice weekly infusion visits which we will continue at this time. Infusion is able to take her today for IV fluids and pain medication which will be her second infusion this week. Labs today reveal decreased hgb to 6.5, normal WBC at 7.1 and mild AST elevation. CXR performed due to persistent cough showing small left pleural effusion and mild bibasilar atelectasis without focal infiltrate.    I informed Jennifer that I had discussed her desire to receive Desferal infusions in clinic with Dr. Duncan which would not be logistically feasible due to the lengthy infusion requirement to make the Desferal effective She does not wish to try dual oral chelation as she had significant difficulty with this last time due to the pill size. With the effect this is having on her quality of life it would be reasonable to take a break from the infusions which she will consider. A repeat Ferriscan would be done prior to finalizing this consideration. She will discuss this further with Dr. Duncan at her upcoming appointment 3/27/23.     The oxycodone prescription was further tapered to 30 tablets per fill on 3/13/23. Jennifer will continue to refill her prescriptions on Mondays. She remains motivated to continue to taper although is somewhat hesitant to name an end date when we expect her to be off of opioids. Previously she mentioned she'd like to be off of oxycodone by June. It seems reasonable to keep this open-ended right now since we're heading in the right direction.  She would like to see me monthly to ensure we are following up on a regular basis to further encourage her tapering pain which is reasonable.      Plan:  -Continue Hydrea to 3000mg daily to help lessen frequency of sickle cell pain. Refilled today.  -Continue slow taper of oxycodone, currently at 30 tablets per refill with plan to fill every Monday. She can continue the frequency at every 4 hours with a max of 6 tablets per day but with the decreased tablets per refill should aim for a max of 4 tablets per day. She can self-reduce infusion days/week but continue to keep the cap at 2/week for now.  -Continue infusion center visits limited to two times per week (Mondays and Fridays). Continue diligent home management with current medications, heat, rest, compression, warm baths.   -If unable to manage at home can go to ED but continue to not do IV narcotics in the emergency room. Ketamine previously added to pain plan in ED  -Continue home Desferal infusions for the time being. Will further discuss possible break with Dr. Duncan at her upcoming visit. Ferriscan to be done before confirming that plan.   -Continue aspirin BID  -RTC 3/27 with  Dr. 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 She had multiple thromboembolic events in 2021 despite adherent anticoagulation use (though warfarin was perpetually low) and there are concerns for chronic thromboembolic disease but did not have pulmonary HTN on a November 2021 cath. She is maintained on chronic PO opioids and twice-weekly infusion visits (since 1/24/22) but has been able to be maintained on this regimen and has stayed out of the ED most of the time with even rarer admissions.     Interval History:  Jennifer is seen for routine follow-up today. Reports significant pain today. Last took oxycodone this morning before multiple clinic visits. Was in for fluids/IV analgesia Tuesday and Thursday this week. Does not feel her symptoms are severe enough to warrant an ED visit. Had a few jobs interview recently which did not pan out. Doesn't care to elaborate but feels okay about it. Desferal has been on hold. Remains on Jadenu and requests refills today. Overall feels positive about the oxycodone taper. Has been using only when needed at home, about 4 tablets/day due to available quantity.     Sickle Cell Disease Comprehensive Checklist    Bone Health/Avascular Necrosis Screening/Symptoms (each visit): no new concerns today    Leg Ulcer evaluation (every visit): none    Hypertension (every visit):stable 3/10/23    Last pulmonary evaluation (asthma, AMAN, pulm HTN): 9/28/22    Stroke/silent cerebral infarct Hx (Y/N): Yes TIA ~2014, first event ~age 1 with full stroke and R sided weakness    Last PCP Visit: 3/6/23    Vaccines:  ? PCV13: 5/13/19  ? Pneumovax (PPSV23): 3/04, 10/09, 7/12/19 (next due 7/2024)  ? Menactra: 4/2010, 9/2015 (MCV done 8/16/21)  ? Influenza: 11/17/2022     Audiology (chelation): done 6/2020, normal.. However, on 6/7, \"While there is no significant change in grade on the CTCAE 4.03 Scale, there were hearing changes bilaterally for both standard and extended high frequency audiometry.  Will need to determine if an ENT " "consult is advised due to the asymmetry in extended high frequency testing.\"     Plan last reviewed with patient: 7/11/22    Patient background: 23 yo F, enjoys movies and kids though there are times where she does not really want to talk to people. Does not have a lot of social support at home.     Sickle Cell Disease History  Primary Hematologist Team: Jose Rafael Duncan  PCP: none  Genotype: SS  Acute Pain Crisis Treatment: (avoiding IV opioids for now, which she has agreed to)    ER   o Oxycodone 15-20 mg x1  o Ketamine 4mg IV x 2--helps with opioid sparing  o Toradol 15 mg IV x1   o Maintenance IV fluids with LR  o Other: Zofran 8 mg IV PRN nausea    Inpatient:  o Home oxycodone/Oxycontin regimen, though home oxycodone dosing could be increased to 20 mg to start  o PCA plan:   - None for now  o Other Medications: Zofran  o She has had success with ketamine starting at 4mg/h and advancing only to 6mg/h, as 8mg/h made her feel quite poorly..  o ASA  o Supportive Care: Docusate, Senna  Chronic Pain Medications:    Home regimen Oxycontin 10 mg q12h, oxycodone 15 mg p.o. q.4-6 hours p.r.n. breakthrough pain.  She also continues on Voltaren gel, and Zoloft among other medications.    -Also benefits from mental health visits, acupuncture  Baseline Hemoglobin: 7 g/dl without chronic transfusions  Hydroxyurea use: Yes  H/O blood transfusions: Yes, several (iron overload) Most recent 11/20/2021    H/O Transfusion Reactions: no    Antibodies:none  H/O Acute Chest Syndrome: Yes    Last episode:9/05/22 (previously 4/26/21, 10/2019)     ICU/intubation: not with 9/2022 admission  H/O Stroke: Yes (managed with chronic transfusions in the past, stopped late Spring 2020)  H/O VTE: Yes (2/2021)  H/O Cholecystectomy or Splenectomy: no  H/O Asthma, Pulm HTN, AVN, Leg Ulcers, Nephropathy, Retinopathy, etc: Iron overload, asthma, chronic lung disease, physical limitations from early " stroke    ---------------------------------------  Jennifer Cervantes's Goals (last discussed 3/16/23, did not discuss today)    1-3 month goal:  Look for a job    6 month goal:      12 month goal:  Move into her own place     Disease-specific goal(s):  Continue to wean oxycodone down every 3-4 weeks  ---------------------------------------      Current Outpatient Medications   Medication Sig Dispense Refill     acetaminophen (TYLENOL) 325 MG tablet Take 2 tablets (650 mg) by mouth every 6 hours as needed for mild pain 120 tablet 3     albuterol (PROVENTIL) (2.5 MG/3ML) 0.083% neb solution Take 2 vials (5 mg) by nebulization every 6 hours as needed for shortness of breath / dyspnea or wheezing 90 mL 3     aspirin (ASA) 81 MG chewable tablet Take 1 tablet (81 mg) by mouth 2 times daily 60 tablet 11     budesonide-formoterol (SYMBICORT) 160-4.5 MCG/ACT Inhaler Inhale 2 puffs into the lungs 2 times daily 10.2 g 3     deferasirox (JADENU) 360 MG tablet Take 4 tablets (1,440 mg) by mouth every evening 120 tablet 4     diclofenac (VOLTAREN) 1 % topical gel Apply 4 g topically 4 times daily 350 g 0     FLUoxetine (PROZAC) 10 MG capsule Take 1 capsule (10 mg) by mouth daily For two weeks, then increase to 20 mg if 10 mg not effective 40 capsule 1     folic acid (FOLVITE) 1 MG tablet Take 1 tablet (1 mg) by mouth daily 30 tablet 0     Hydroxyurea 1000 MG TABS Take 3,000 mg by mouth daily 90 tablet 3     medroxyPROGESTERone (PROVERA) 2.5 MG tablet Take 1 tablet (2.5 mg) by mouth daily for 60 days 60 tablet 0     ondansetron (ZOFRAN) 8 MG tablet Take 1 tablet (8 mg) by mouth every 8 hours as needed 30 tablet 1     oxyCODONE IR (ROXICODONE) 15 MG tablet Take 1 tablet (15 mg) by mouth every 4 hours as needed for severe pain Goal 4 per day. Max 6 per day. 30 tablet 0     traZODone (DESYREL) 50 MG tablet Take 1 tablet (50 mg) by mouth At Bedtime 30 tablet 1     EPINEPHrine (ANY BX GENERIC EQUIV) 0.3 MG/0.3ML injection 2-pack Inject 0.3  mLs (0.3 mg) into the muscle as needed for anaphylaxis (Patient not taking: Reported on 3/10/2023) 1 each 1     naloxone (NARCAN) 4 MG/0.1ML nasal spray Spray 4 mg into one nostril alternating nostrils as needed for opioid reversal every 2-3 minutes until assistance arrives (Patient not taking: Reported on 3/10/2023)         Past Medical History  Past Medical History:   Diagnosis Date     Anxiety      Bleeding disorder (H)      Blood clotting disorder (H)      Cerebral infarction (H) 2015     Cognitive developmental delay     low IQ. Please recognize when managing pain and planning with her     Depressive disorder      Hemiplegia and hemiparesis following cerebral infarction affecting right dominant side (H)     right hand contractures     Iron overload due to repeated red blood cell transfusions      Migraines      Multiple subsegmental pulmonary emboli without acute cor pulmonale (H) 02/01/2021     Oppositional defiant behavior      Presence of intrauterine contraceptive device 2/18/2020     Superficial venous thrombosis of arm, right 03/25/2021     Uncomplicated asthma      Past Surgical History:   Procedure Laterality Date     AS INSERT TUNNELED CV 2 CATH W/O PORT/PUMP       CHOLECYSTECTOMY       CV RIGHT HEART CATH MEASUREMENTS RECORDED N/A 11/18/2021    Procedure: Right Heart Cath;  Surgeon: Jackson Stauffer MD;  Location:  HEART CARDIAC CATH LAB     INSERT PORT VASCULAR ACCESS Left 4/21/2021    Procedure: INSERTION, VASCULAR ACCESS PORT (NOT SURE ON SIDE UNTIL REMOVAL);  Surgeon: Rajan More MD;  Location: [facility] OR     IR CHEST PORT PLACEMENT > 5 YRS OF AGE  4/21/2021     IR CVC NON TUNNEL LINE REMOVAL  5/6/2021     IR CVC NON TUNNEL PLACEMENT > 5 YRS  04/07/2020     IR CVC NON TUNNEL PLACEMENT > 5 YRS  4/30/2021     IR CVC NON TUNNEL PLACEMENT > 5 YRS  9/7/2022     IR PORT REMOVAL LEFT  4/21/2021     REMOVE PORT VASCULAR ACCESS Left 4/21/2021    Procedure: REMOVAL, VASCULAR ACCESS PORT LEFT;  Surgeon:  "Rajan More MD;  Location: [facility] OR     REPAIR TENDON ELBOW Right 10/02/2019    Procedure: Right Elbow Flexor Lengthening, Flexor Pronator Slide Of Wrist and Finger, Thumb Adductor Lengthening;  Surgeon: Anai Franco MD;  Location: [facility]     TONSILLECTOMY Bilateral 10/02/2019    Procedure: Bilateral Tonsillectomy;  Surgeon: Farhana Guy MD;  Location: [facility]     ZZC BREAST REDUCTION (INCLUDES LIPO) TIER 3 Bilateral 04/23/2019     Allergies   Allergen Reactions     Contrast Dye      Hives and breathing issues     Fish-Derived Products Hives     Seafood Hives     Diagnostic X-Ray Materials      Gadolinium      Social History   Social History     Tobacco Use     Smoking status: Never     Smokeless tobacco: Never   Substance Use Topics     Alcohol use: Not Currently     Alcohol/week: 0.0 standard drinks of alcohol     Drug use: Never    Still living at home with mom and extended family.     Past medical history and social history were reviewed.    Physical Examination:  /78 (BP Location: Right arm, Patient Position: Sitting, Cuff Size: Adult Regular)   Pulse 98   Temp 98.3  F (36.8  C) (Oral)   Resp 16   Ht 1.626 m (5' 4.02\")   Wt 69.4 kg (153 lb 1.6 oz)   LMP 12/01/2019 (Approximate)   SpO2 95%   BMI 26.27 kg/m    Wt Readings from Last 10 Encounters:   04/14/23 69.4 kg (153 lb 1.6 oz)   03/27/23 70.8 kg (156 lb)   03/27/23 69.8 kg (153 lb 14.4 oz)   03/16/23 68.1 kg (150 lb 3.2 oz)   03/10/23 67.6 kg (149 lb)   03/06/23 69.1 kg (152 lb 4.8 oz)   02/02/23 70.3 kg (155 lb)   01/30/23 68.9 kg (152 lb)   01/26/23 68.9 kg (152 lb)   01/15/23 68.9 kg (151 lb 14.4 oz)     General: Pleasant female, tearful and in apparent discomfort, fidgeting  Eyes: EOMI, PERRL. Mild. scleral icterus.  Respiratory: Normal respiratory effort.  MSK: Contractured right arm and hand 2/2 stroke.  Neurologic: Grossly nonfocal. A/O x 4.  Skin: No rashes, petechiae, or bruising noted on exposed " skin.    Laboratory Data:  Most Recent 3 CBC's:  Recent Labs   Lab Test 04/14/23  1059 03/27/23 2136 03/27/23  1136   WBC 12.9* 16.2* 12.3*   HGB 7.1* 7.6* 7.9*   MCV 87 89 87    443 475*   ANEUTAUTO 9.7* 12.0* 9.0*    Most Recent 3 BMP's:  Recent Labs   Lab Test 04/14/23  1059 03/27/23 2136 03/27/23  1136    138 140   POTASSIUM 3.3* 3.7 4.0   CHLORIDE 109* 107 109*   CO2 21* 18* 19*   BUN 4.9* 8.0 10.4   CR 0.46* 0.50* 0.51   ANIONGAP 9 13 12   MICAH 8.9 8.9 8.9   * 89 93   PROTTOTAL 7.1 7.5 7.5   ALBUMIN 4.1 4.3 4.3    Most Recent 2 LFT's:  Recent Labs   Lab Test 04/14/23  1059 03/27/23 2136   AST 38* 42*   ALT 18 20   ALKPHOS 65 73   BILITOTAL 4.0* 4.5*      Lab Results   Component Value Date    RETP 22.7 (H) 04/14/2023      Assessment and Plan:  1. Sickle Cell HgbSS Disease  2. Recent hospitalization with acute chest, pulmonary edema (1/2023)  3. Chronic Pain  4. 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 History of CVA  7. Hearing loss    Jennifer continue to adhere to twice weekly infusion visits which we will continue at this time. She inquires about getting in for infusion today which cannot be accommodated due to scheduling aside from our plan to limit to only 2 infusions per week. Denies any recurrent shortness of breath or cough recently. Labs today reviewed revealing hgb 7.1, WBC 12.9., slightly decrease in potassium to 3.3.     Desferal is currently on hold due to desire to stop/hold infusion due to decreased quality of life related to the amount of time she needed to be connected for infusion. Remains on Jadenu. A repeat Ferriscan was previously requested by Dr. Duncan but not yet scheduled.    Last oxycodone prescription was further tapered to 30 tablet per fill on 3/13/23. We discussed today the plan to taper further to 25 tablets per fill with next refill anticipated 4/17/23.     Plan:  -Continue Hydrea to 3000mg daily to help  lessen frequency of sickle cell pain. Refilled today.  -Continue slow taper of oxycodone, currently at 30 tablets per refill with plan to decrease to 25 tablets per fill next on 4/17/23. She can continue the frequency at every 4 hours with a max of 6 tablets per day but with the decreased tablets per refill should aim for a max of 4 tablets per day. She can self-reduce infusion days/week and we will continue to keep the cap at 2/week for now.  -Continue infusion center visits limited to two times per week (Mondays and Fridays). Continue diligent home management with current medications, heat, rest, compression, warm baths.   -If unable to manage at home can go to ED but continue to not do IV narcotics in the emergency room. Ketamine previously added to pain plan in ED  - Desferal infusions on hold. Continue Jadenu and check Ferriscan. Will likely need to resume infusions in the future.   -Continue aspirin BID  -RTC with me 5/8    *** minutes spent on the date of the encounter doing {2021 E&M time in:227141}       Patricia Mantilla CNP  [facility]  [address] [place] [id number]  [phone]      Again, thank you for allowing me to participate in the care of your patient.        Sincerely,        Patricia Mantilla CNP

## 2023-04-14 NOTE — PROGRESS NOTES
Hand Therapy Progress Note    Current Date:  4/14/2023    Diagnosis: R hemiplegia s/p releases for improved ROM   DOI: stroke ~age 1  DOS: 10/2019   Procedure: R flexor pronator release, brachioradialis lengthening, biceps tenotomy, and thenar release.   Post: 3.5 years    Reporting period is 3/3/23 to 4/14/2023    Subjective:   Subjective changes noted by patient:  I'm still wearing the brace often and doing the stretches.   Functional changes noted by patient:  Improvement in positioning for daily activities  Patient has noted adverse reaction to:  Notes some discomfort of thumb in static progressive hand and wrist brace - advised to bring in to next session, provided w/ foam padding in session today.     Objective:   ROM  Pain Report: - none  + mild    ++ moderate    +++ severe   Elbow 3/3/2023 3/3/2023 4/14/23   AROM (PROM) L R R   Extension 0 -44 (-38) -40 (-26)   Flexion 144 144 nt   Supination 75 -15 (0) -10 (60)   Pronation 90 90 nt     ROM  Pain Report: - none  + mild    ++ moderate    +++ severe   Wrist 3/3/2023 3/3/2023 4/14/23   AROM (PROM) L R R   Extension 80 -80 (-58) -56 (-30)   Flexion 72 85 nt   RD full negligible nt   UD full negligible nt     Please refer to the daily flowsheet for treatment provided today.     Assessment:  Response to therapy has been improvement to:  ROM of Elbow:  Ext  and Supination  Wrist:  Ext  and Supination    Overall Assessment:  Patient is ready to progress to next level of protocol.  Patient would benefit from continued therapy to achieve rehab potential  STG/LTG:  STGoals have been reviewed and progress or achievement has occurred;  see goal sheet for details and updates.    Plan:  Frequency/Duration:  Recommend continuing to see patient  1 X week, once daily  for 6 weeks  Appropriateness of Rx I have re-evaluated this patient and find that the nature, scope, duration and intensity of the therapy is appropriate for the medical condition of the  patient.  Recommendations for Continued Therapy  Cont HEP and bracing.   Elbow ext brace overnight and in 1-2 hour intervals during the day  Alternate w/ static progressive resting hand splint for increased wrist ext overnight and in 1-2 hour intervals during the day    Next Visit:  Check fit of elbow brace, adjust resting hand brace  Manual therapy for improved motion

## 2023-04-14 NOTE — TELEPHONE ENCOUNTER
Spoke to patient regarding need to cancel upcoming [facility] appointments on 4/17 and 4/24 due to provider being unexpectedly out of office. Offered patient the option of rescheduling with a different  provider, or another community provider, but she declined rescheduling. She plans to start therapy sessions with [facility] in July.    Amalia Parekh,   [facility]  [phone]    Triage and Transition Services - Behavioral Healthcare Providers

## 2023-04-15 ENCOUNTER — HEALTH MAINTENANCE LETTER (OUTPATIENT)
Age: 23
End: 2023-04-15

## 2023-04-16 LAB
HGB A1 MFR BLD: 6 %
HGB A2 MFR BLD: 3.5 %
HGB C MFR BLD: 0 %
HGB E MFR BLD: 0 %
HGB F MFR BLD: 3.7 %
HGB FRACT BLD ELPH-IMP: ABNORMAL
HGB OTHER MFR BLD: 0 %
HGB S BLD QL SOLY: ABNORMAL
HGB S MFR BLD: 86.8 %
PATH INTERP BLD-IMP: ABNORMAL

## 2023-04-17 ENCOUNTER — INFUSION THERAPY VISIT (OUTPATIENT)
Dept: TRANSPLANT | Facility: CLINIC | Age: 23
End: 2023-04-17
Payer: COMMERCIAL

## 2023-04-17 ENCOUNTER — TELEPHONE (OUTPATIENT)
Dept: ONCOLOGY | Facility: CLINIC | Age: 23
End: 2023-04-17
Payer: COMMERCIAL

## 2023-04-17 ENCOUNTER — PATIENT OUTREACH (OUTPATIENT)
Dept: CARE COORDINATION | Facility: CLINIC | Age: 23
End: 2023-04-17
Payer: COMMERCIAL

## 2023-04-17 VITALS
HEART RATE: 87 BPM | SYSTOLIC BLOOD PRESSURE: 127 MMHG | TEMPERATURE: 98.5 F | DIASTOLIC BLOOD PRESSURE: 82 MMHG | OXYGEN SATURATION: 95 % | RESPIRATION RATE: 16 BRPM

## 2023-04-17 DIAGNOSIS — D57.00 SICKLE CELL PAIN CRISIS (H): ICD-10-CM

## 2023-04-17 DIAGNOSIS — G81.10 SPASTIC HEMIPLEGIA, UNSPECIFIED ETIOLOGY, UNSPECIFIED LATERALITY (H): Primary | ICD-10-CM

## 2023-04-17 PROCEDURE — 96376 TX/PRO/DX INJ SAME DRUG ADON: CPT

## 2023-04-17 PROCEDURE — 258N000003 HC RX IP 258 OP 636: Performed by: PEDIATRICS

## 2023-04-17 PROCEDURE — 96374 THER/PROPH/DIAG INJ IV PUSH: CPT

## 2023-04-17 PROCEDURE — 96361 HYDRATE IV INFUSION ADD-ON: CPT

## 2023-04-17 PROCEDURE — 250N000011 HC RX IP 250 OP 636: Performed by: PEDIATRICS

## 2023-04-17 RX ORDER — ONDANSETRON 4 MG/1
8 TABLET, FILM COATED ORAL
Status: CANCELLED
Start: 2023-07-01

## 2023-04-17 RX ORDER — HEPARIN SODIUM (PORCINE) LOCK FLUSH IV SOLN 100 UNIT/ML 100 UNIT/ML
5 SOLUTION INTRAVENOUS
Status: CANCELLED | OUTPATIENT
Start: 2023-07-01

## 2023-04-17 RX ORDER — HEPARIN SODIUM,PORCINE 10 UNIT/ML
5 VIAL (ML) INTRAVENOUS
Status: CANCELLED | OUTPATIENT
Start: 2023-07-01

## 2023-04-17 RX ORDER — HEPARIN SODIUM (PORCINE) LOCK FLUSH IV SOLN 100 UNIT/ML 100 UNIT/ML
5 SOLUTION INTRAVENOUS
Status: DISCONTINUED | OUTPATIENT
Start: 2023-04-17 | End: 2023-04-17 | Stop reason: HOSPADM

## 2023-04-17 RX ORDER — DIPHENHYDRAMINE HCL 25 MG
25 CAPSULE ORAL
Status: CANCELLED
Start: 2023-07-01

## 2023-04-17 RX ORDER — OXYCODONE HYDROCHLORIDE 15 MG/1
15 TABLET ORAL EVERY 4 HOURS PRN
Qty: 25 TABLET | Refills: 0 | Status: SHIPPED | OUTPATIENT
Start: 2023-04-17 | End: 2023-04-24

## 2023-04-17 RX ADMIN — HYDROMORPHONE HYDROCHLORIDE 1 MG: 1 INJECTION, SOLUTION INTRAMUSCULAR; INTRAVENOUS; SUBCUTANEOUS at 13:54

## 2023-04-17 RX ADMIN — HYDROMORPHONE HYDROCHLORIDE 1 MG: 1 INJECTION, SOLUTION INTRAMUSCULAR; INTRAVENOUS; SUBCUTANEOUS at 11:52

## 2023-04-17 RX ADMIN — SODIUM CHLORIDE, POTASSIUM CHLORIDE, SODIUM LACTATE AND CALCIUM CHLORIDE 1000 ML: 600; 310; 30; 20 INJECTION, SOLUTION INTRAVENOUS at 11:46

## 2023-04-17 RX ADMIN — HYDROMORPHONE HYDROCHLORIDE 1 MG: 1 INJECTION, SOLUTION INTRAMUSCULAR; INTRAVENOUS; SUBCUTANEOUS at 12:52

## 2023-04-17 ASSESSMENT — PAIN SCALES - GENERAL: PAINLEVEL: EXTREME PAIN (9)

## 2023-04-17 NOTE — PROGRESS NOTES
Social Work Note: Transportation  Oncology Clinic     Data/Intervention:  Patient Name:  Jennifer Cervantes  /Age: 2000, 23 years old     Call From: [facility]        Reason for Call:  Transportation     Assessment:   called [employer] to arrange ride through patient's insurance. [employer] arranged  for patient from home with [employer].  Patient will need to call when ready for return ride home ([phone]).      Plan:  Patient is aware of the transportation plan.  available to assist with any other needs.      CARLOS Chavez,[facility]  Hematology/Oncology Social Worker  Phone:[phone] Pager: [phone]

## 2023-04-17 NOTE — TELEPHONE ENCOUNTER
Narcotic Refill Request    Medication(s) requested:  Oxycodone 15 mg  Person Requesting Refill: Patient  What pain is the medication treating: sickle cell pain  How is the medication being taken?: every 4 hours as needed for severe pain.  Does pt have enough for today? No, will be out today  Is pain being adequately controlled on the current regimen?: continues to help pain, but does require intermittent IV pain medication  Experiencing any side effects from medication?: no    Date of most recent appointment:  4/14/23  Any No Show Visits:no  Next appointment:   5/12/23 with Patricia Mantilla  Last fill date and by whom:  Patricia Mantilla 4/10/23   Reviewed: No access    Routed provider: Patricia Mantilla

## 2023-04-17 NOTE — PROGRESS NOTES
Infusion Nursing Note:  Jennifer Cervantes presents today for add on infusion.    Patient seen by provider today: No   present during visit today: Not Applicable.    Note: Patient add on for IVF/pain medication. Reports pain starting Friday to left side and low back rated 9/10. Pain continues despite home medication regimen. Denies chest pain, SOB, N/V, weakness, fever, lightheadedness. ONC toxicity assessment completed. Home medications and allergies reviewed. Received 1L LR over 2 hours. Dilaudid 1 mg IV given every hour x 3 doses. Patient reports pain improvement following 3rd dose of dilaudid.       Intravenous Access:  Implanted Port.    Treatment Conditions:  Not Applicable.      Post Infusion Assessment:  Patient tolerated infusion without incident.  Site patent and intact, free from redness, edema or discomfort.  No evidence of extravasations.  Access discontinued per protocol.       Discharge Plan:   AVS to patient via MYCHART.    Patient discharged in stable condition accompanied by: self.  Departure Mode: Ambulatory.      Jennifer Lai RN

## 2023-04-17 NOTE — TELEPHONE ENCOUNTER
Oncology Nurse Triage - Sickle Cell Pain Crisis:    Situation: Jennifer  calling about Sickle Cell Pain Crisis, requesting to be added on for IV fluids and pain medicine    Background:     Patient's last infusion was 4/13/23  Last clinic visit date:4/14/23 with Patricia Mantilla  Does patient have active treatment plan?  Yes      Assessment of Symptoms:  Onset/Duration of symptoms: 3 day    Is it typical sickle cell pain? Yes   Location: left side  Character: Sharp           Intensity: 9/10    Any radiation of pain, numbness, tingling, weakness, warmth, swelling, discoloration of arms or legs?No     Fever?No  (if yes max temperature recorded in last 24 hours):      Chest Pain Present: No     Shortness of breath: No     Other home therapies tried: HEAT/HEATING PAD and WARM BATH     Last home medication taken and when: 0600 oxycodone    Any Refills Needed?: Yes oxycodone    Does patient have transportation & length of time to get to clinic: No needs a ride      Grades for reference:       Grade 1 -   o Patient has active treatment plan.   o Patients last scheduled clinic appointment was attended  o Patient has not been seen in infusion or ED in the last 3 days (clarify exclusions in therapy plans)   o Afebrile   o Typical sickle cell pain   o Home medications have been tried   o No other symptoms     Recommendations:   Added to infusion waitlist per protocol.    BMT can take at 12pm.   Pt accepted 12 pm appt.   Message sent to [employer] to coordinate ride and scheduling to add on today.

## 2023-04-18 RX ORDER — DIPHENHYDRAMINE HYDROCHLORIDE 50 MG/ML
50 INJECTION INTRAMUSCULAR; INTRAVENOUS
Status: CANCELLED
Start: 2023-04-18

## 2023-04-18 RX ORDER — ALBUTEROL SULFATE 90 UG/1
1-2 AEROSOL, METERED RESPIRATORY (INHALATION)
Status: CANCELLED
Start: 2023-04-18

## 2023-04-18 RX ORDER — ALBUTEROL SULFATE 0.83 MG/ML
2.5 SOLUTION RESPIRATORY (INHALATION)
Status: CANCELLED | OUTPATIENT
Start: 2023-04-18

## 2023-04-18 RX ORDER — HEPARIN SODIUM,PORCINE 10 UNIT/ML
5 VIAL (ML) INTRAVENOUS
Status: CANCELLED | OUTPATIENT
Start: 2023-04-18

## 2023-04-18 RX ORDER — MEPERIDINE HYDROCHLORIDE 25 MG/ML
25 INJECTION INTRAMUSCULAR; INTRAVENOUS; SUBCUTANEOUS EVERY 30 MIN PRN
Status: CANCELLED | OUTPATIENT
Start: 2023-04-18

## 2023-04-18 RX ORDER — HEPARIN SODIUM (PORCINE) LOCK FLUSH IV SOLN 100 UNIT/ML 100 UNIT/ML
5 SOLUTION INTRAVENOUS
Status: CANCELLED | OUTPATIENT
Start: 2023-04-18

## 2023-04-18 RX ORDER — METHYLPREDNISOLONE SODIUM SUCCINATE 125 MG/2ML
125 INJECTION, POWDER, LYOPHILIZED, FOR SOLUTION INTRAMUSCULAR; INTRAVENOUS
Status: CANCELLED
Start: 2023-04-18

## 2023-04-18 RX ORDER — EPINEPHRINE 1 MG/ML
0.3 INJECTION, SOLUTION INTRAMUSCULAR; SUBCUTANEOUS EVERY 5 MIN PRN
Status: CANCELLED | OUTPATIENT
Start: 2023-04-18

## 2023-04-19 ENCOUNTER — NURSE TRIAGE (OUTPATIENT)
Dept: ONCOLOGY | Facility: CLINIC | Age: 23
End: 2023-04-19
Payer: COMMERCIAL

## 2023-04-19 NOTE — TELEPHONE ENCOUNTER
Attempted to contact pt to inform them of  response to her questions. LVM requesting pt call triage back.

## 2023-04-19 NOTE — TELEPHONE ENCOUNTER
Return call from pt, who states she received a message to call triage back for follow up. Writer reviewed per Dr. Duncan, there are no IVF/pain med appts available on the weekends and he would not reduce her IVF/pain med frequency because pt is starting Jr. Pt voiced understanding. No additional follow up required by triage at this time.

## 2023-04-19 NOTE — TELEPHONE ENCOUNTER
She states she forgot to ask two questions yesterday:    Pt states she will be starting Jr soon and is wondering if this will affect her current IVF/Pain medication regimen. Pt currently receives IVF/Pain medications twice a week on Monday and Thursday.     Second question, is there a possibility for her to get a third scheduled infusion of IVF/Pain medications on the weekends to avoid ED visits.

## 2023-04-20 ENCOUNTER — INFUSION THERAPY VISIT (OUTPATIENT)
Dept: ONCOLOGY | Facility: CLINIC | Age: 23
End: 2023-04-20
Attending: PEDIATRICS
Payer: COMMERCIAL

## 2023-04-20 ENCOUNTER — NURSE TRIAGE (OUTPATIENT)
Dept: ONCOLOGY | Facility: CLINIC | Age: 23
End: 2023-04-20
Payer: COMMERCIAL

## 2023-04-20 ENCOUNTER — PATIENT OUTREACH (OUTPATIENT)
Dept: CARE COORDINATION | Facility: CLINIC | Age: 23
End: 2023-04-20
Payer: COMMERCIAL

## 2023-04-20 VITALS
RESPIRATION RATE: 16 BRPM | HEART RATE: 78 BPM | SYSTOLIC BLOOD PRESSURE: 118 MMHG | OXYGEN SATURATION: 96 % | TEMPERATURE: 99.2 F | DIASTOLIC BLOOD PRESSURE: 77 MMHG

## 2023-04-20 DIAGNOSIS — D57.00 SICKLE CELL PAIN CRISIS (H): ICD-10-CM

## 2023-04-20 DIAGNOSIS — G81.10 SPASTIC HEMIPLEGIA, UNSPECIFIED ETIOLOGY, UNSPECIFIED LATERALITY (H): Primary | ICD-10-CM

## 2023-04-20 PROCEDURE — 96376 TX/PRO/DX INJ SAME DRUG ADON: CPT

## 2023-04-20 PROCEDURE — 250N000011 HC RX IP 250 OP 636: Performed by: PEDIATRICS

## 2023-04-20 PROCEDURE — 258N000003 HC RX IP 258 OP 636: Performed by: PEDIATRICS

## 2023-04-20 PROCEDURE — 96361 HYDRATE IV INFUSION ADD-ON: CPT

## 2023-04-20 PROCEDURE — 96374 THER/PROPH/DIAG INJ IV PUSH: CPT

## 2023-04-20 RX ORDER — HEPARIN SODIUM,PORCINE 10 UNIT/ML
5 VIAL (ML) INTRAVENOUS
Status: CANCELLED | OUTPATIENT
Start: 2023-07-01

## 2023-04-20 RX ORDER — HEPARIN SODIUM (PORCINE) LOCK FLUSH IV SOLN 100 UNIT/ML 100 UNIT/ML
5 SOLUTION INTRAVENOUS
Status: CANCELLED | OUTPATIENT
Start: 2023-07-01

## 2023-04-20 RX ORDER — DIPHENHYDRAMINE HCL 25 MG
25 CAPSULE ORAL
Status: CANCELLED
Start: 2023-07-01

## 2023-04-20 RX ORDER — ONDANSETRON 4 MG/1
8 TABLET, FILM COATED ORAL
Status: CANCELLED
Start: 2023-07-01

## 2023-04-20 RX ORDER — HEPARIN SODIUM (PORCINE) LOCK FLUSH IV SOLN 100 UNIT/ML 100 UNIT/ML
500 SOLUTION INTRAVENOUS ONCE
Status: COMPLETED | OUTPATIENT
Start: 2023-04-20 | End: 2023-04-20

## 2023-04-20 RX ORDER — DIPHENHYDRAMINE HCL 25 MG
25 CAPSULE ORAL
Status: DISCONTINUED | OUTPATIENT
Start: 2023-04-20 | End: 2023-04-20 | Stop reason: HOSPADM

## 2023-04-20 RX ORDER — ONDANSETRON 8 MG/1
8 TABLET, ORALLY DISINTEGRATING ORAL
Status: DISCONTINUED | OUTPATIENT
Start: 2023-04-20 | End: 2023-04-20 | Stop reason: HOSPADM

## 2023-04-20 RX ADMIN — Medication 500 UNITS: at 13:43

## 2023-04-20 RX ADMIN — SODIUM CHLORIDE, POTASSIUM CHLORIDE, SODIUM LACTATE AND CALCIUM CHLORIDE 1000 ML: 600; 310; 30; 20 INJECTION, SOLUTION INTRAVENOUS at 11:04

## 2023-04-20 RX ADMIN — HYDROMORPHONE HYDROCHLORIDE 1 MG: 1 INJECTION, SOLUTION INTRAMUSCULAR; INTRAVENOUS; SUBCUTANEOUS at 11:07

## 2023-04-20 RX ADMIN — HYDROMORPHONE HYDROCHLORIDE 1 MG: 1 INJECTION, SOLUTION INTRAMUSCULAR; INTRAVENOUS; SUBCUTANEOUS at 13:11

## 2023-04-20 RX ADMIN — HYDROMORPHONE HYDROCHLORIDE 1 MG: 1 INJECTION, SOLUTION INTRAMUSCULAR; INTRAVENOUS; SUBCUTANEOUS at 12:07

## 2023-04-20 NOTE — PROGRESS NOTES
SW received referral to assist with transportation coordination. SW called [employer] and arranged ride for patient. SW informed patient and clinic of ride information  between 10-10:30. Patient will call [phone] for return ride when appointment is complete. SW will remain available as needed.         Corry GONZALEZ, [facility]  - Oncology  Phone : [phone]  Pager: [phone]

## 2023-04-20 NOTE — TELEPHONE ENCOUNTER
Pt called in to triage requesting IVF pain meds for sharp back and L side pain rated 9/10 since last night. Stated last took prn oxycodone at 0600 this morning without relief.     Denied any fevers, chills, cough, sob, chest pain, numbness or tingling, swelling, or other symptoms not typical of sickle cell pain.     Pt's last infusion was 4/17/23, last clinic visit 4/14/23 with follow-up on 5/8/23 with Patricia Mantilla CNP  .   Patient states needs ride, 25-30 min from clinic.    Pt denied being out of home medications and needing any refills today.     Pt qualifies for sickle cell standing order protocol.    If you do not hear from the infusion center by 2pm then you will not be able to get in for an infusion today.     Added to infusion wait list.    0729: [facility] can offer an apt at 1100.   Called Jennifer who confirmed she can come to the apt at this time.  Updated [facility] Charge Nurse.  Message sent to [facility] to schedule.  Message sent to  to assist with transportation.    Routed to Patricia Mantilla CNP and MAURISIO Asencio

## 2023-04-20 NOTE — PROGRESS NOTES
Infusion Nursing Note:  Jennifer Cervantes presents today for IV fluids and Pain Medication.    Patient seen by provider on 4/14/23    Note: Pt called triage this morning.  She currently is reporting pain in her back and L side at a 9/10.  While pt was in infusion she received three doses of dilaudid.  Prior to discharge pain was 2/10,  And Jennifer felt like she could go home and continue to manage pain there    Intravenous Access:  Implanted Port.          Post Infusion Assessment:  Patient tolerated infusion without incident.       Discharge Plan:   AVS to patient via MYCHART.  Patient will return 5/3/23 for first dose of cranlizumab  Patient discharged in stable condition accompanied by: self.  Departure Mode: Ambulatory.  She has rides to and from clinic set up by social work.      Marina Leblanc RN

## 2023-04-24 ENCOUNTER — NURSE TRIAGE (OUTPATIENT)
Dept: ONCOLOGY | Facility: CLINIC | Age: 23
End: 2023-04-24

## 2023-04-24 ENCOUNTER — INFUSION THERAPY VISIT (OUTPATIENT)
Dept: TRANSPLANT | Facility: CLINIC | Age: 23
End: 2023-04-24
Attending: PEDIATRICS
Payer: COMMERCIAL

## 2023-04-24 ENCOUNTER — THERAPY VISIT (OUTPATIENT)
Dept: OCCUPATIONAL THERAPY | Facility: CLINIC | Age: 23
End: 2023-04-24
Payer: COMMERCIAL

## 2023-04-24 VITALS
RESPIRATION RATE: 18 BRPM | TEMPERATURE: 97.7 F | DIASTOLIC BLOOD PRESSURE: 78 MMHG | HEART RATE: 92 BPM | OXYGEN SATURATION: 97 % | SYSTOLIC BLOOD PRESSURE: 122 MMHG

## 2023-04-24 DIAGNOSIS — R25.2 SPASTICITY: ICD-10-CM

## 2023-04-24 DIAGNOSIS — D57.00 SICKLE CELL PAIN CRISIS (H): ICD-10-CM

## 2023-04-24 DIAGNOSIS — I69.351 HEMIPLEGIA AND HEMIPARESIS FOLLOWING CEREBRAL INFARCTION AFFECTING RIGHT DOMINANT SIDE (H): Primary | ICD-10-CM

## 2023-04-24 DIAGNOSIS — G81.10 SPASTIC HEMIPLEGIA, UNSPECIFIED ETIOLOGY, UNSPECIFIED LATERALITY (H): Primary | ICD-10-CM

## 2023-04-24 PROCEDURE — 96361 HYDRATE IV INFUSION ADD-ON: CPT

## 2023-04-24 PROCEDURE — 97140 MANUAL THERAPY 1/> REGIONS: CPT | Mod: GO

## 2023-04-24 PROCEDURE — 258N000003 HC RX IP 258 OP 636: Performed by: PEDIATRICS

## 2023-04-24 PROCEDURE — 96374 THER/PROPH/DIAG INJ IV PUSH: CPT

## 2023-04-24 PROCEDURE — 250N000011 HC RX IP 250 OP 636: Performed by: PEDIATRICS

## 2023-04-24 PROCEDURE — 97763 ORTHC/PROSTC MGMT SBSQ ENC: CPT | Mod: GO

## 2023-04-24 PROCEDURE — 96376 TX/PRO/DX INJ SAME DRUG ADON: CPT

## 2023-04-24 RX ORDER — OXYCODONE HYDROCHLORIDE 15 MG/1
15 TABLET ORAL EVERY 4 HOURS PRN
Qty: 25 TABLET | Refills: 0 | Status: SHIPPED | OUTPATIENT
Start: 2023-04-24 | End: 2023-05-01

## 2023-04-24 RX ORDER — ONDANSETRON 4 MG/1
8 TABLET, FILM COATED ORAL
Status: CANCELLED
Start: 2023-07-01

## 2023-04-24 RX ORDER — HEPARIN SODIUM (PORCINE) LOCK FLUSH IV SOLN 100 UNIT/ML 100 UNIT/ML
5 SOLUTION INTRAVENOUS
Status: CANCELLED | OUTPATIENT
Start: 2023-07-01

## 2023-04-24 RX ORDER — DIPHENHYDRAMINE HCL 25 MG
25 CAPSULE ORAL
Status: CANCELLED
Start: 2023-07-01

## 2023-04-24 RX ORDER — HEPARIN SODIUM,PORCINE 10 UNIT/ML
5 VIAL (ML) INTRAVENOUS
Status: CANCELLED | OUTPATIENT
Start: 2023-07-01

## 2023-04-24 RX ADMIN — SODIUM CHLORIDE, POTASSIUM CHLORIDE, SODIUM LACTATE AND CALCIUM CHLORIDE 1000 ML: 600; 310; 30; 20 INJECTION, SOLUTION INTRAVENOUS at 09:39

## 2023-04-24 RX ADMIN — HYDROMORPHONE HYDROCHLORIDE 1 MG: 1 INJECTION, SOLUTION INTRAMUSCULAR; INTRAVENOUS; SUBCUTANEOUS at 10:37

## 2023-04-24 RX ADMIN — HYDROMORPHONE HYDROCHLORIDE 1 MG: 1 INJECTION, SOLUTION INTRAMUSCULAR; INTRAVENOUS; SUBCUTANEOUS at 09:39

## 2023-04-24 RX ADMIN — HYDROMORPHONE HYDROCHLORIDE 1 MG: 1 INJECTION, SOLUTION INTRAMUSCULAR; INTRAVENOUS; SUBCUTANEOUS at 11:42

## 2023-04-24 ASSESSMENT — PAIN SCALES - GENERAL: PAINLEVEL: EXTREME PAIN (9)

## 2023-04-24 NOTE — TELEPHONE ENCOUNTER
Narcotic Refill Request    Medication(s) requested:  Oxycodone   Person Requesting Refill: Jennifer   What pain is the medication treating:  Sickle cell pain   How is the medication being taken?:Takes 4 tabs per day if having a lot of pain then 6 max   Does pt have enough for today?  Will be out today   Is pain being adequately controlled on the current regimen?: Yes   Experiencing any side effects from medication?:  None     Date of most recent appointment:  4/14/23 Farhan Mantilla   Any No Show Visits: No   Next appointment:   5/8/23 farhan Mantilla   Last fill date and by whom:  4/17/23 Farhan Mantilla    Reviewed:  No access     Routed provider:  Farhan Mantilla

## 2023-04-24 NOTE — NURSING NOTE
Infusion Nursing Note:  Jennifer Cervantes presents today for IVF and pain meds.    Patient seen by provider today: No   present during visit today: Not Applicable.    Note: Jennifer here today reporting 9/10 pain generalized to the left side of her body. VSS, meds and allergies reviewed. See flowsheet for systems assessment. 1L LR bolus administered over 2hrs, total 3mg IV dilaudid administered. Pain is reported as tolerable on discharge. No additional infusion needs identified. Of note, she does report some increased SOB she feels is related to her asthma right now. She is not currently hypoxic and is breathing comfortably. Encouraged patient to continue her current asthma regimen and notify her team with any further changes.      Intravenous Access:  Implanted Port.    Treatment Conditions:  Not Applicable.      Post Infusion Assessment:  Patient tolerated infusion without incident.  Blood return noted pre and post infusion.  Site patent and intact, free from redness, edema or discomfort.  No evidence of extravasations.  Access discontinued per protocol.       Discharge Plan:   Patient discharged in stable condition accompanied by: self.  Departure Mode: Ambulatory.      Allison Bowman RN

## 2023-04-24 NOTE — TELEPHONE ENCOUNTER
Pt called in to triage requesting IVF pain meds for left side  pain rated 10/10 x 2 days.     Stated last took prn oxycodone at 6am  this morning without relief.     Denied any fevers, chills, cough, sob, chest pain, numbness or tingling or other symptoms not typical of sickle cell pain.     Pt's last infusion was 4/20/23, last clinic visit 4/14/23 with follow-up on 5/8/23 with Patricia Mantilla .     Patient states they  Will be at clinic for appt at 8:30am. If can't get in right after that then she will need a ride.     Pt denied being out of home medications and needing any refills today.     Pt qualifies for sickle cell standing order protocol.    If you do not hear from the infusion center by 2pm then you will not be able to get in for an infusion today.     BMT can Take her at 9:30am.  Call placed to Jennifer and that works out for her.      Message sent to scheduling to set up appt.

## 2023-04-24 NOTE — PROGRESS NOTES
Objective Measures 4/24/23    ROM  Pain Report: - none  + mild    ++ moderate    +++ severe   Elbow 3/3/2023 3/3/2023 4/24/23   AROM (PROM) L R R   Extension 0 -44 (-38) -45 (-20)   Flexion 144 144    Supination 75 -15 (0)    Pronation 90 90

## 2023-04-24 NOTE — PROGRESS NOTES
Infusion Nursing Note:  Jennifer Cervantes presents today for IVF and pain meds.    Patient seen by provider today: No   present during visit today: Not Applicable.    Note: Jennifer here today reporting 9/10 pain generalized to the left side of her body. VSS, meds and allergies reviewed. See flowsheet for systems assessment. 1L LR bolus administered over 2hrs, total 3mg IV dilaudid administered. No additional infusion needs identified. Of note, she does report some increased SOB she feels is related to her asthma right now. She is not currently hypoxic and is breathing comfortably. Encouraged patient to continue her current asthma regimen and notify her team with any further changes.      Intravenous Access:  Implanted Port.    Treatment Conditions:  Not Applicable.      Post Infusion Assessment:  Patient tolerated infusion without incident.  Blood return noted pre and post infusion.  Site patent and intact, free from redness, edema or discomfort.  No evidence of extravasations.  Access discontinued per protocol.       Discharge Plan:   Patient discharged in stable condition accompanied by: self.  Departure Mode: Ambulatory.      Allison Bowman RN

## 2023-04-27 ENCOUNTER — TELEPHONE (OUTPATIENT)
Dept: ONCOLOGY | Facility: CLINIC | Age: 23
End: 2023-04-27
Payer: COMMERCIAL

## 2023-04-27 ENCOUNTER — NURSE TRIAGE (OUTPATIENT)
Dept: ONCOLOGY | Facility: CLINIC | Age: 23
End: 2023-04-27
Payer: COMMERCIAL

## 2023-04-27 ENCOUNTER — NURSE TRIAGE (OUTPATIENT)
Dept: NURSING | Facility: CLINIC | Age: 23
End: 2023-04-27
Payer: COMMERCIAL

## 2023-04-27 NOTE — TELEPHONE ENCOUNTER
Patient calling in to cancel her appt with Dr. Pierce today. She has already arrived, but she said she is in too much pain at this time and will reschedule. Transferred to scheduling to cancel and reschedule appt. She is currently on the waitlist for infusion appt for sickle cell pain.

## 2023-04-27 NOTE — TELEPHONE ENCOUNTER
Pt called in to triage requesting IVF pain meds for back and lower back pain, L side and L neck pain rated 10/10 that started last night. Does get neck pain sometimes but not all the time, and she reports this as typical sickle cell pain.     Stated last took prn Oxycodone at 0600 this morning without relief.    Denied any fevers, chills, cough, sob, chest pain, numbness or tingling, swelling, or other symptoms not typical of sickle cell pain.     Pt's last infusion was 4/24/23, last clinic visit 4/14/23 with follow-up on 5/8/23 with Patricia Mantilla CNP.     Patient states she has an apt at 0845 (0900) with Dr. Pierce x 60 min and has a ride scheduled. Would like to get into infusion after that apt if possible.      Pt denied being out of home medications and needing any refills today.     Pt qualifies for sickle cell standing order protocol.    If you do not hear from the infusion center by 2pm then you will not be able to get in for an infusion today.     Added to infusion wait list.

## 2023-04-27 NOTE — TELEPHONE ENCOUNTER
Called pt back to discuss infusion questions.    Pt is concerned she will not get into infusion today.   Informed Jennifer that the infusion centers are all very full, and it is very unlikely that she will get into infusion today. We will not know for sure though until we hear from infusion about cancellations, if there are some, or other openings. If there is an opening that we can offer her, we will call by 2 p.m., and if she needs transportation assistance, then we will assist.    Pt came to clinic, cancelled apt with Dr. Stan lewis, and reports that she is now going back home.   Advised her to go to the ED if she is in a lot of pain as getting into infusion today is not very likely.   Pt refused to go to the Emergency Room.  She states she will call back tomorrow if she does not get in today.    Pt voiced understanding of the above information.

## 2023-04-28 ENCOUNTER — INFUSION THERAPY VISIT (OUTPATIENT)
Dept: ONCOLOGY | Facility: CLINIC | Age: 23
End: 2023-04-28
Attending: PEDIATRICS
Payer: COMMERCIAL

## 2023-04-28 ENCOUNTER — NURSE TRIAGE (OUTPATIENT)
Dept: ONCOLOGY | Facility: CLINIC | Age: 23
End: 2023-04-28
Payer: COMMERCIAL

## 2023-04-28 ENCOUNTER — PATIENT OUTREACH (OUTPATIENT)
Dept: CARE COORDINATION | Facility: CLINIC | Age: 23
End: 2023-04-28
Payer: COMMERCIAL

## 2023-04-28 VITALS
SYSTOLIC BLOOD PRESSURE: 133 MMHG | HEART RATE: 97 BPM | RESPIRATION RATE: 16 BRPM | TEMPERATURE: 97.9 F | OXYGEN SATURATION: 90 % | DIASTOLIC BLOOD PRESSURE: 87 MMHG

## 2023-04-28 DIAGNOSIS — D57.00 SICKLE CELL PAIN CRISIS (H): ICD-10-CM

## 2023-04-28 DIAGNOSIS — G81.10 SPASTIC HEMIPLEGIA, UNSPECIFIED ETIOLOGY, UNSPECIFIED LATERALITY (H): Primary | ICD-10-CM

## 2023-04-28 PROCEDURE — 96361 HYDRATE IV INFUSION ADD-ON: CPT

## 2023-04-28 PROCEDURE — 258N000003 HC RX IP 258 OP 636: Performed by: PEDIATRICS

## 2023-04-28 PROCEDURE — 96374 THER/PROPH/DIAG INJ IV PUSH: CPT

## 2023-04-28 PROCEDURE — 250N000011 HC RX IP 250 OP 636: Performed by: PEDIATRICS

## 2023-04-28 PROCEDURE — 96376 TX/PRO/DX INJ SAME DRUG ADON: CPT

## 2023-04-28 RX ORDER — ONDANSETRON 4 MG/1
8 TABLET, FILM COATED ORAL
Status: CANCELLED
Start: 2023-07-01

## 2023-04-28 RX ORDER — HEPARIN SODIUM (PORCINE) LOCK FLUSH IV SOLN 100 UNIT/ML 100 UNIT/ML
5 SOLUTION INTRAVENOUS
Status: DISCONTINUED | OUTPATIENT
Start: 2023-04-28 | End: 2023-04-28 | Stop reason: HOSPADM

## 2023-04-28 RX ORDER — HEPARIN SODIUM (PORCINE) LOCK FLUSH IV SOLN 100 UNIT/ML 100 UNIT/ML
5 SOLUTION INTRAVENOUS
Status: CANCELLED | OUTPATIENT
Start: 2023-07-01

## 2023-04-28 RX ORDER — DIPHENHYDRAMINE HCL 25 MG
25 CAPSULE ORAL
Status: CANCELLED
Start: 2023-07-01

## 2023-04-28 RX ORDER — HEPARIN SODIUM,PORCINE 10 UNIT/ML
5 VIAL (ML) INTRAVENOUS
Status: CANCELLED | OUTPATIENT
Start: 2023-07-01

## 2023-04-28 RX ADMIN — HYDROMORPHONE HYDROCHLORIDE 1 MG: 1 INJECTION, SOLUTION INTRAMUSCULAR; INTRAVENOUS; SUBCUTANEOUS at 14:43

## 2023-04-28 RX ADMIN — SODIUM CHLORIDE, POTASSIUM CHLORIDE, SODIUM LACTATE AND CALCIUM CHLORIDE 1000 ML: 600; 310; 30; 20 INJECTION, SOLUTION INTRAVENOUS at 12:32

## 2023-04-28 RX ADMIN — Medication 5 ML: at 15:02

## 2023-04-28 RX ADMIN — HYDROMORPHONE HYDROCHLORIDE 1 MG: 1 INJECTION, SOLUTION INTRAMUSCULAR; INTRAVENOUS; SUBCUTANEOUS at 13:37

## 2023-04-28 RX ADMIN — HYDROMORPHONE HYDROCHLORIDE 1 MG: 1 INJECTION, SOLUTION INTRAMUSCULAR; INTRAVENOUS; SUBCUTANEOUS at 12:32

## 2023-04-28 NOTE — TELEPHONE ENCOUNTER
"Pt calling with concerns about sharp neck pain, started yesterday on right side, does not matter how head is position, turn or move, pain is constantly there. Pain is there   When sitting up, neck hurts to the point of having to hold her neck due to pain.   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 Call back if have other questions/concerns. Pt verbalized understanding and will go to nearest ED.    Luzma Trent, RN, BSN  4/27/2023 at 8:35 PM  [facility]        Reason for Disposition    [1] SEVERE neck pain (e.g., excruciating, unable to do any normal activities) AND [2] not improved after 2 hours of pain medicine    Additional Information    Negative: Shock suspected (e.g., cold/pale/clammy skin, too weak to stand, low BP, rapid pulse)    Negative: Difficult to awaken or acting confused (e.g., disoriented, slurred speech)    Negative: [1] Similar pain previously AND [2] it was from \"heart attack\"    Negative: [1] Similar pain previously AND [2] it was from \"angina\" AND [3] not relieved by nitroglycerin    Negative: Sounds like a life-threatening emergency to the triager    Negative: Followed a neck injury (e.g., MVA, sports, impact or collision)    Negative: Chest pain    Negative: Lymph node in the neck is swollen or painful to the touch    Negative: Sore throat is main symptom    Negative: Difficulty breathing or unusual sweating (e.g., sweating without exertion)    Negative: [1] Stiff neck (can't put chin to chest) AND [2] headache    Negative: [1] Stiff neck (can't put chin to chest) AND [2] fever    Negative: Weakness of an arm or hand    Negative: Problems with bowel or bladder control    Negative: Head is twisting to one side (or ask \"is it turning against your will?\")    Negative: Patient sounds very sick or weak to the triager    Answer Assessment - Initial " "Assessment Questions  1. ONSET: \"When did the pain begin?\"       Yesterday 4/26  2. LOCATION: \"Where does it hurt?\"       Right side of neck  3. PATTERN \"Does the pain come and go, or has it been constant since it started?\"       Pain came on suddenly out of no where, constantly there, not getting better or worse  4. SEVERITY: \"How bad is the pain?\"  (Scale 1-10; or mild, moderate, severe)    - NO PAIN (0): no pain or only slight stiffness     - MILD (1-3): doesn't interfere with normal activities     - MODERATE (4-7): interferes with normal activities or awakens from sleep     - SEVERE (8-10):  excruciating pain, unable to do any normal activities       Pain level 10, sharp pain  5. RADIATION: \"Does the pain go anywhere else, shoot into your arms?\"      Pain in lower back  6. CORD SYMPTOMS: \"Any weakness or numbness of the arms or legs?\"      When moving right arm such as lifting or move arm, pt states she can feel sharp pain in neck. Pt have to  her right arm  7. CAUSE: \"What do you think is causing the neck pain?\"      NA  8. NECK OVERUSE: \"Any recent activities that involved turning or twisting the neck?\"      NA  9. OTHER SYMPTOMS: \"Do you have any other symptoms?\" (e.g., headache, fever, chest pain, difficulty breathing, neck swelling)      Pt denies  10. PREGNANCY: \"Is there any chance you are pregnant?\" \"When was your last menstrual period?\"        NA    Protocols used: NECK PAIN OR [id number]-O-KG      "

## 2023-04-28 NOTE — PROGRESS NOTES
Infusion Nursing Note:  Jennifer Cervantes presents today for IV fluids and pain medication.    Patient seen by provider today: No   present during visit today: Not Applicable.    Note: Pt presents today with 10/10 neck and back pain. She states the neck pain is not typical for her sickle cell pain. She denies swelling, redness or warmth at the site. She notes her breathing has been normal, no dyspnea or shortness of breath.    Intravenous Access:  Implanted Port.    Treatment Conditions:  Not Applicable.      Post Infusion Assessment:  Patient tolerated infusion without incident.  Blood return noted pre and post infusion.  Site patent and intact, free from redness, edema or discomfort.  No evidence of extravasations.  Access discontinued per protocol.       Discharge Plan:   Patient declined prescription refills.  Discharge instructions reviewed with: Patient.  Patient and/or family verbalized understanding of discharge instructions and all questions answered.  AVS to patient via [employer].  Patient will return 05/03/23 for next appointment.   Patient discharged in stable condition accompanied by: self.  Departure Mode: Ambulatory.      Emily Meese, RN

## 2023-04-28 NOTE — TELEPHONE ENCOUNTER
Oncology Nurse Triage - Sickle Cell Pain Crisis:    Situation: Jennifer  calling about Sickle Cell Pain Crisis, requesting to be added on for IV fluids and pain medicine    Background:     Patient's last infusion was 4/20/23  Last clinic visit date:4/14/23  Does patient have active treatment plan?  Yes      Assessment of Symptoms:  Onset/Duration of symptoms: 3 day    Is it typical sickle cell pain? Yes   Location: right side of neck, lower back, left side  Character: Sharp           Intensity: 10/10    Any radiation of pain, numbness, tingling, weakness, warmth, swelling, discoloration of arms or legs?No     Fever?No    Chest Pain Present: No     Shortness of breath: No     Other home therapies tried: HEAT/HEATING PAD     Last home medication taken and when: 6:00am     Any Refills Needed?: No     Does patient have transportation & length of time to get to clinic: No         Recommendations:     Meets protocol    1111 Appt available for today as soon as pt able to arrive.     1125 Per Abdullahi, spoke to Jennifer, pt was able to get a ride to the clinic from mom and  will help with ride home after infusion appt.     1130 Confirmed with Jennifer on way to clinic and will be here by Noon.     Scheduling request sent.       Please note, if you are late for your appt, you risk losing your infusion appt as it may delay another patient's infusion who arrived on time.

## 2023-04-28 NOTE — PATIENT INSTRUCTIONS
[facility] Triage and after hours / weekends / holidays:  [phone]    Please call the triage or after hours line if you experience a temperature greater than or equal to 100.4, shaking chills, have uncontrolled nausea, vomiting and/or diarrhea, dizziness, shortness of breath, chest pain, bleeding, unexplained bruising, or if you have any other new/concerning symptoms, questions or concerns.      If you are having any concerning symptoms or wish to speak to a provider before your next infusion visit, please call triage to notify them so we can adequately serve you.     If you need a refill on a narcotic prescription or other medication, please call before your infusion appointment.                April 2023 Sunday Monday Tuesday Wednesday Thursday Friday Saturday                                 1       2     3    SPEC INFUSION 2 HR (120 MIN)  12:00 PM   (120 min.)    SIPC INFUSION NURSE   [facility] 4     5     6    BMT INFUSION 2 HR (120 MIN)  11:00 AM   (120 min.)    BMT INFUSION NURSE   [facility] [place] 7     8       9     10     11    SPEC INFUSION 2 HR (120 MIN)  10:30 AM   (120 min.)   [facility]IPC INFUSION NURSE   [facility] 12     13    SPEC INFUSION 2 HR (120 MIN)  10:00 AM   (120 min.)    SIPC INFUSION NURSE   [facility]    HAND THERAPY TREATMENT   3:00 PM   (30 min.)   Ally Longoria OTR   [facility] 14    HAND THERAPY TREATMENT  10:00 AM   (60 min.)   Ally Longoria OTR   [facility]    LAB CENTRAL  11:45 AM   (15 min.)    [facility] DRAW   [facility]    RETURN CCSL  12:00 PM   (45 min.)   Patricia Mantilla CNP   [facility] 15       16     17    BMT  INFUSION 2 HR (120 MIN)  12:00 PM   (120 min.)   [facility] BMT INFUSION NURSE   [facility] 18     19     20    ONC INFUSION 2 HR (120 MIN)  11:00 AM   (120 min.)    ONC INFUSION NURSE   [facility] 21     22       23     24    HAND THERAPY TREATMENT   8:30 AM   (60 min.)   Ally Longoria OTR   [facility]    BMT INFUSION 2 HR (120 MIN)   9:30 AM   (120 min.)   [facility] BMT INFUSION NURSE   [facility] 25     26     27     28    ONC INFUSION 2 HR (120 MIN)  12:00 PM   (120 min.)    ONC INFUSION NURSE   [facility] 29       30                                                 May 2023      [place] Monday Tuesday Wednesday Thursday Friday Saturday        1     2     3    LAB CENTRAL  12:00 PM   (15 min.)   [facility] DRAW   [facility]    ONC INFUSION 4 HR (240 MIN)  12:30 PM   (240 min.)    ONC INFUSION NURSE   [facility] 4     5     6       7     8    LAB CENTRAL   1:15 PM   (15 min.)   [facility]   [facility]    RETURN CCSL   1:30 PM   (45 min.)   Patricia Mantilla CNP   [facility] 9     10     11     12     13       14     15     16     17    LAB CENTRAL  12:00 PM   (15 min.)   [facility]   [facility]    ONC INFUSION 4 HR (240 MIN)  12:30 PM   (240 min.)    ONC INFUSION NURSE   [facility] 18     19     20       21     22     23     24     25    HAND THERAPY TREATMENT  10:00 AM   (60 min.)   Ally Longoria OTR   [facility] 26     27       28     29     30     31                                      Lab Results:  No results found for this or any previous visit (from the  past 12 hour(s)).

## 2023-04-28 NOTE — PROGRESS NOTES
Social Work Note: Transportation  Oncology Clinic     Data/Intervention:  Patient Name:  Jennifer Cervantes DOB/Age: 2000,      Call From: Page from Triage RN        Reason for Call:  Transportation     Assessment:   called patient who reported that their mom is arranging a ride to infusion due to time constraint. Patient asked SW for assistance with ride home. SW scheduled a will call ride home from clinic with [employer] ([phone]). Patient provided with ride details and is aware they will need to call for return ride.     Plan:  Patient is aware of the transportation plan.  available to assist with any other needs.      CARLOS Chavez,[facility]  Hematology/Oncology Social Worker  Phone:[phone] Pager: [phone]

## 2023-04-30 ENCOUNTER — HOSPITAL ENCOUNTER (EMERGENCY)
Facility: CLINIC | Age: 23
Discharge: HOME | End: 2023-04-30
Attending: EMERGENCY MEDICINE | Admitting: EMERGENCY MEDICINE
Payer: COMMERCIAL

## 2023-04-30 VITALS
DIASTOLIC BLOOD PRESSURE: 72 MMHG | WEIGHT: 165 LBS | TEMPERATURE: 98.1 F | HEART RATE: 102 BPM | SYSTOLIC BLOOD PRESSURE: 128 MMHG | RESPIRATION RATE: 18 BRPM | HEIGHT: 64 IN | OXYGEN SATURATION: 99 % | BODY MASS INDEX: 28.17 KG/M2

## 2023-04-30 DIAGNOSIS — D57.00 SICKLE CELL PAIN CRISIS (H): ICD-10-CM

## 2023-04-30 LAB
ALBUMIN SERPL BCG-MCNC: 4.3 G/DL (ref 3.5–5.2)
ALP SERPL-CCNC: 71 U/L (ref 35–104)
ALT SERPL W P-5'-P-CCNC: 18 U/L (ref 10–35)
ANION GAP SERPL CALCULATED.3IONS-SCNC: 12 MMOL/L (ref 7–15)
AST SERPL W P-5'-P-CCNC: 41 U/L (ref 10–35)
BILIRUB SERPL-MCNC: 4.3 MG/DL
BUN SERPL-MCNC: 5.8 MG/DL (ref 6–20)
CALCIUM SERPL-MCNC: 9.2 MG/DL (ref 8.6–10)
CHLORIDE SERPL-SCNC: 104 MMOL/L (ref 98–107)
CREAT SERPL-MCNC: 0.5 MG/DL (ref 0.51–0.95)
DEPRECATED HCO3 PLAS-SCNC: 20 MMOL/L (ref 22–29)
ERYTHROCYTE [DISTWIDTH] IN BLOOD BY AUTOMATED COUNT: ABNORMAL %
GFR SERPL CREATININE-BSD FRML MDRD: >90 ML/MIN/1.73M2
GLUCOSE SERPL-MCNC: 123 MG/DL (ref 70–99)
HCT VFR BLD AUTO: 20.7 % (ref 35–47)
HGB BLD-MCNC: 6.9 G/DL (ref 11.7–15.7)
HOLD SPECIMEN: NORMAL
HOLD SPECIMEN: NORMAL
MCH RBC QN AUTO: 30.9 PG (ref 26.5–33)
MCHC RBC AUTO-ENTMCNC: 33.3 G/DL (ref 31.5–36.5)
MCV RBC AUTO: 93 FL (ref 78–100)
PLATELET # BLD AUTO: 410 10E3/UL (ref 150–450)
POTASSIUM SERPL-SCNC: 3.5 MMOL/L (ref 3.4–5.3)
PROT SERPL-MCNC: 7.4 G/DL (ref 6.4–8.3)
RBC # BLD AUTO: 2.23 10E6/UL (ref 3.8–5.2)
RETICS # AUTO: 0.51 10E6/UL (ref 0.03–0.1)
RETICS/RBC NFR AUTO: 22.7 % (ref 0.5–2)
SODIUM SERPL-SCNC: 136 MMOL/L (ref 136–145)
WBC # BLD AUTO: 16.6 10E3/UL (ref 4–11)

## 2023-04-30 PROCEDURE — 96361 HYDRATE IV INFUSION ADD-ON: CPT | Performed by: EMERGENCY MEDICINE

## 2023-04-30 PROCEDURE — 99285 EMERGENCY DEPT VISIT HI MDM: CPT | Mod: 25 | Performed by: EMERGENCY MEDICINE

## 2023-04-30 PROCEDURE — 76536 US EXAM OF HEAD AND NECK: CPT | Mod: 26 | Performed by: EMERGENCY MEDICINE

## 2023-04-30 PROCEDURE — 76536 US EXAM OF HEAD AND NECK: CPT | Performed by: EMERGENCY MEDICINE

## 2023-04-30 PROCEDURE — 36415 COLL VENOUS BLD VENIPUNCTURE: CPT | Performed by: EMERGENCY MEDICINE

## 2023-04-30 PROCEDURE — 250N000011 HC RX IP 250 OP 636: Performed by: EMERGENCY MEDICINE

## 2023-04-30 PROCEDURE — 250N000009 HC RX 250: Performed by: EMERGENCY MEDICINE

## 2023-04-30 PROCEDURE — 85027 COMPLETE CBC AUTOMATED: CPT | Performed by: EMERGENCY MEDICINE

## 2023-04-30 PROCEDURE — 85045 AUTOMATED RETICULOCYTE COUNT: CPT | Performed by: EMERGENCY MEDICINE

## 2023-04-30 PROCEDURE — 96374 THER/PROPH/DIAG INJ IV PUSH: CPT | Performed by: EMERGENCY MEDICINE

## 2023-04-30 PROCEDURE — 250N000013 HC RX MED GY IP 250 OP 250 PS 637: Performed by: EMERGENCY MEDICINE

## 2023-04-30 PROCEDURE — 250N000011 HC RX IP 250 OP 636

## 2023-04-30 PROCEDURE — 80053 COMPREHEN METABOLIC PANEL: CPT | Performed by: EMERGENCY MEDICINE

## 2023-04-30 PROCEDURE — 258N000003 HC RX IP 258 OP 636: Performed by: EMERGENCY MEDICINE

## 2023-04-30 PROCEDURE — 96375 TX/PRO/DX INJ NEW DRUG ADDON: CPT | Performed by: EMERGENCY MEDICINE

## 2023-04-30 PROCEDURE — 96376 TX/PRO/DX INJ SAME DRUG ADON: CPT | Performed by: EMERGENCY MEDICINE

## 2023-04-30 RX ORDER — DIPHENHYDRAMINE HYDROCHLORIDE 50 MG/ML
50 INJECTION INTRAMUSCULAR; INTRAVENOUS ONCE
Status: DISCONTINUED | OUTPATIENT
Start: 2023-04-30 | End: 2023-04-30

## 2023-04-30 RX ORDER — SODIUM CHLORIDE, SODIUM LACTATE, POTASSIUM CHLORIDE, CALCIUM CHLORIDE 600; 310; 30; 20 MG/100ML; MG/100ML; MG/100ML; MG/100ML
1000 INJECTION, SOLUTION INTRAVENOUS CONTINUOUS
Status: DISCONTINUED | OUTPATIENT
Start: 2023-04-30 | End: 2023-04-30 | Stop reason: HOSPADM

## 2023-04-30 RX ORDER — KETOROLAC TROMETHAMINE 15 MG/ML
15 INJECTION, SOLUTION INTRAMUSCULAR; INTRAVENOUS
Status: COMPLETED | OUTPATIENT
Start: 2023-04-30 | End: 2023-04-30

## 2023-04-30 RX ORDER — HEPARIN SODIUM (PORCINE) LOCK FLUSH IV SOLN 100 UNIT/ML 100 UNIT/ML
SOLUTION INTRAVENOUS
Status: COMPLETED
Start: 2023-04-30 | End: 2023-04-30

## 2023-04-30 RX ORDER — OXYCODONE HYDROCHLORIDE 10 MG/1
20 TABLET ORAL ONCE
Status: COMPLETED | OUTPATIENT
Start: 2023-04-30 | End: 2023-04-30

## 2023-04-30 RX ORDER — METHYLPREDNISOLONE SODIUM SUCCINATE 40 MG/ML
40 INJECTION, POWDER, LYOPHILIZED, FOR SOLUTION INTRAMUSCULAR; INTRAVENOUS EVERY 4 HOURS
Status: DISCONTINUED | OUTPATIENT
Start: 2023-04-30 | End: 2023-04-30

## 2023-04-30 RX ORDER — ONDANSETRON 2 MG/ML
8 INJECTION INTRAMUSCULAR; INTRAVENOUS
Status: DISCONTINUED | OUTPATIENT
Start: 2023-04-30 | End: 2023-04-30 | Stop reason: HOSPADM

## 2023-04-30 RX ADMIN — Medication 500 UNITS: at 07:01

## 2023-04-30 RX ADMIN — Medication 4 MG: at 04:12

## 2023-04-30 RX ADMIN — KETOROLAC TROMETHAMINE 15 MG: 15 INJECTION, SOLUTION INTRAMUSCULAR; INTRAVENOUS at 05:47

## 2023-04-30 RX ADMIN — SODIUM CHLORIDE, POTASSIUM CHLORIDE, SODIUM LACTATE AND CALCIUM CHLORIDE 1000 ML: 600; 310; 30; 20 INJECTION, SOLUTION INTRAVENOUS at 04:07

## 2023-04-30 RX ADMIN — OXYCODONE HYDROCHLORIDE 20 MG: 10 TABLET ORAL at 03:56

## 2023-04-30 RX ADMIN — KETOROLAC TROMETHAMINE 15 MG: 15 INJECTION, SOLUTION INTRAMUSCULAR; INTRAVENOUS at 04:07

## 2023-04-30 RX ADMIN — Medication 4 MG: at 05:47

## 2023-04-30 RX ADMIN — SODIUM CHLORIDE, POTASSIUM CHLORIDE, SODIUM LACTATE AND CALCIUM CHLORIDE 1000 ML: 600; 310; 30; 20 INJECTION, SOLUTION INTRAVENOUS at 04:13

## 2023-04-30 ASSESSMENT — ACTIVITIES OF DAILY LIVING (ADL)
ADLS_ACUITY_SCORE: 35
ADLS_ACUITY_SCORE: 35

## 2023-04-30 NOTE — DISCHARGE INSTRUCTIONS
Instructions from your doctor today:  Emergency Department (ED) testing is focused on the potential causes of your symptoms that are the most dangerous possibilities, and cannot cover every possibility. Based on the evaluation, it was deemed sufficiently safe to discharge and continue management through the clinics. Thus, follow-up is very important to assess for improvement/worsening, potential further testing, and potential treatment adjustments. If you were given opioid pain medications or other medications that can make you drowsy while in the ED, you should not drive for at least several hours and not until you feel completely back to normal.     Please make an appointment to follow up with:  - Your Primary Care Provider or hematologist in 2-3 days  - If you do not have a primary care provider, you can be seen in follow-up and establish care by calling any of the clinics below:     - [facility] (phone: [phone])     - Primary Care / [facility] (phone: [phone])   - Have your clinic provider review the results from today's visit with you again, including any potential follow-up or additional testing that may be needed based on the results. Occasionally, incidental findings are found on later review by radiologists that may need follow-up.     Return to the Emergency Department immediately if you have worsening symptoms, or any other urgent health concerns.

## 2023-04-30 NOTE — ED TRIAGE NOTES
Pt C/O acute sickle cell pain throughout body and joints more concentrated on right side, pt reports difficulty turning head and moving right arm d/t increase stiffness and pain x 4 days.  Pt consult PCP and went infusion center Friday last for treatment, but symptoms persisted and worsened since then.  Denies N/V/D, bloody stools/urine, CP, SOB,fevers or chills.  VSS, pt alert x 4 and in significant pain.     Triage Assessment     Row Name 04/30/23 0304       Triage Assessment (Adult)    Airway WDL WDL       Respiratory WDL    Respiratory WDL WDL       Skin Circulation/Temperature WDL    Skin Circulation/Temperature WDL WDL       Cardiac WDL    Cardiac WDL WDL       Peripheral/Neurovascular WDL    Peripheral Neurovascular WDL WDL       Cognitive/Neuro/Behavioral WDL    Cognitive/Neuro/Behavioral WDL WDL

## 2023-05-01 ENCOUNTER — NURSE TRIAGE (OUTPATIENT)
Dept: ONCOLOGY | Facility: CLINIC | Age: 23
End: 2023-05-01
Payer: COMMERCIAL

## 2023-05-01 ENCOUNTER — PATIENT OUTREACH (OUTPATIENT)
Dept: CARE COORDINATION | Facility: CLINIC | Age: 23
End: 2023-05-01
Payer: COMMERCIAL

## 2023-05-01 ENCOUNTER — INFUSION THERAPY VISIT (OUTPATIENT)
Dept: TRANSPLANT | Facility: CLINIC | Age: 23
End: 2023-05-01
Attending: PEDIATRICS
Payer: COMMERCIAL

## 2023-05-01 VITALS
HEART RATE: 83 BPM | RESPIRATION RATE: 16 BRPM | DIASTOLIC BLOOD PRESSURE: 83 MMHG | TEMPERATURE: 98.2 F | SYSTOLIC BLOOD PRESSURE: 125 MMHG | OXYGEN SATURATION: 95 %

## 2023-05-01 DIAGNOSIS — G81.10 SPASTIC HEMIPLEGIA, UNSPECIFIED ETIOLOGY, UNSPECIFIED LATERALITY (H): Primary | ICD-10-CM

## 2023-05-01 DIAGNOSIS — D57.00 SICKLE CELL PAIN CRISIS (H): ICD-10-CM

## 2023-05-01 PROCEDURE — 258N000003 HC RX IP 258 OP 636: Performed by: PEDIATRICS

## 2023-05-01 PROCEDURE — 96376 TX/PRO/DX INJ SAME DRUG ADON: CPT

## 2023-05-01 PROCEDURE — 250N000011 HC RX IP 250 OP 636: Performed by: PEDIATRICS

## 2023-05-01 PROCEDURE — 96361 HYDRATE IV INFUSION ADD-ON: CPT

## 2023-05-01 PROCEDURE — 96374 THER/PROPH/DIAG INJ IV PUSH: CPT

## 2023-05-01 RX ORDER — HEPARIN SODIUM (PORCINE) LOCK FLUSH IV SOLN 100 UNIT/ML 100 UNIT/ML
5 SOLUTION INTRAVENOUS
Status: DISCONTINUED | OUTPATIENT
Start: 2023-05-01 | End: 2023-05-01 | Stop reason: HOSPADM

## 2023-05-01 RX ORDER — DIPHENHYDRAMINE HCL 25 MG
25 CAPSULE ORAL
Status: CANCELLED
Start: 2023-07-01

## 2023-05-01 RX ORDER — HEPARIN SODIUM (PORCINE) LOCK FLUSH IV SOLN 100 UNIT/ML 100 UNIT/ML
5 SOLUTION INTRAVENOUS
Status: CANCELLED | OUTPATIENT
Start: 2023-07-01

## 2023-05-01 RX ORDER — HEPARIN SODIUM,PORCINE 10 UNIT/ML
5 VIAL (ML) INTRAVENOUS
Status: CANCELLED | OUTPATIENT
Start: 2023-07-01

## 2023-05-01 RX ORDER — ONDANSETRON 4 MG/1
8 TABLET, FILM COATED ORAL
Status: CANCELLED
Start: 2023-07-01

## 2023-05-01 RX ORDER — OXYCODONE HYDROCHLORIDE 15 MG/1
15 TABLET ORAL EVERY 4 HOURS PRN
Qty: 25 TABLET | Refills: 0 | Status: SHIPPED | OUTPATIENT
Start: 2023-05-01 | End: 2023-05-08

## 2023-05-01 RX ADMIN — HYDROMORPHONE HYDROCHLORIDE 1 MG: 1 INJECTION, SOLUTION INTRAMUSCULAR; INTRAVENOUS; SUBCUTANEOUS at 14:03

## 2023-05-01 RX ADMIN — HYDROMORPHONE HYDROCHLORIDE 1 MG: 1 INJECTION, SOLUTION INTRAMUSCULAR; INTRAVENOUS; SUBCUTANEOUS at 12:02

## 2023-05-01 RX ADMIN — SODIUM CHLORIDE, POTASSIUM CHLORIDE, SODIUM LACTATE AND CALCIUM CHLORIDE 1000 ML: 600; 310; 30; 20 INJECTION, SOLUTION INTRAVENOUS at 12:02

## 2023-05-01 RX ADMIN — HYDROMORPHONE HYDROCHLORIDE 1 MG: 1 INJECTION, SOLUTION INTRAMUSCULAR; INTRAVENOUS; SUBCUTANEOUS at 13:02

## 2023-05-01 RX ADMIN — Medication 5 ML: at 14:03

## 2023-05-01 ASSESSMENT — PAIN SCALES - GENERAL: PAINLEVEL: EXTREME PAIN (9)

## 2023-05-01 NOTE — TELEPHONE ENCOUNTER
Narcotic Refill Request    Medication(s) requested:  Oxycodone   Person Requesting Refill: Jennifer   What pain is the medication treating: Sickle cell pain   How is the medication being taken?:takes 1 tab every 4 hours   Does pt have enough for today? Will be out today   Is pain being adequately controlled on the current regimen?: yes   Experiencing any side effects from medication?: no     Date of most recent appointment:  4/14/23 Patricia Mantilla   Any No Show Visits: no   Next appointment:  5/8/23 Patricia Mantilla     Last fill date and by whom:  4/24/23 Patricia Mantilla    Reviewed:  No access     Routed provider:  Patricia Mantilla

## 2023-05-01 NOTE — TELEPHONE ENCOUNTER
Oncology Nurse Triage - Sickle Cell Pain Crisis:    Situation: Jennifer  calling about Sickle Cell Pain Crisis, requesting to be added on for IV fluids and pain medicine    Background:     Patient's last infusion was 4/28/23  Last clinic visit date:4/14/23 Patricia Mantilla   Does patient have active treatment plan?  Yes      Assessment of Symptoms:  Onset/Duration of symptoms: 2 day    Is it typical sickle cell pain? Yes   Location: neck  Character: Sharp           Intensity: 9/10    Any radiation of pain, numbness, tingling, weakness, warmth, swelling, discoloration of arms or legs?No     Fever?No  (if yes max temperature recorded in last 24 hours):      Chest Pain Present: No     Shortness of breath: No     Other home therapies tried: HEAT/HEATING PAD and WARM BATH     Last home medication taken and when: 6:00am oxycodone     Any Refills Needed?: Yes     Does patient have transportation & length of time to get to clinic: Yes  60-90 minutes        Recommendations:     BMT can Take Jennifer at 12:00     Call placed to Jennifer and she can make it to the 12:00 appt.     Message sent to social work to arrange a ride     Message sent to [facility] to Schedule         If you do not hear from the infusion center by 2pm then you will not be able to get in for an infusion today. If symptoms worsen while waiting for call back, and/or you experience fever, chills, SOB, chest pain, cough, n/v, dizziness, numbness, swelling, discoloration of extremities, then seek emergency evaluation in Emergency Department.     Please note, if you are late for your appt, you risk losing your infusion appt as it may delay another patient's infusion who arrived on time.

## 2023-05-01 NOTE — PROGRESS NOTES
Infusion Nursing Note:  Jennifer Cervantes presents today for add-on infusion.    Patient seen by provider today: No   present during visit today: Not Applicable.    Note: Pt here today for add-on IVF and pain medication. No labs/provider visit. VSS. Meds and allergies reviewed. Pt reports 9/10 right sided neck pain, but otherwise feels well and denies fever/chills, n/v/d, bleeding, or respiratory symptoms. Pt received 1L LR IV and 1 mg Dilaudid IVP x3 doses (total 3 mg Dilaudid IVP administered). Pt reported pain decreased to 3/10 at end of infusion and felt pain was tolerable for discharge.      Intravenous Access:  Implanted Port.    Treatment Conditions:  Not Applicable.      Post Infusion Assessment:  Patient tolerated infusion without incident.  Blood return noted pre and post infusion.  Site patent and intact, free from redness, edema or discomfort.  No evidence of extravasations.  Access discontinued per protocol.       Discharge Plan:   Patient discharged in stable condition accompanied by: self.  Departure Mode: Ambulatory.      Jaida Kelly RN

## 2023-05-01 NOTE — PROGRESS NOTES
Social Work Note: Transportation  Oncology Clinic     Data/Intervention:  Patient Name:  Jennifer Cervantes  /Age: 2000, 23 years old     Call From: Jennifer Cervantes        Reason for Call:  Transportation      Assessment:   called [employer] to arrange ride through patient's insurance. [employer] arranged  for patient from home with [employer].  Patient will need to call when ready for return ride home ([phone]).      Plan:  Patient is aware of the transportation plan.  available to assist with any other needs.      CARLOS Chavez,[facility]  Hematology/Oncology Social Worker  Phone:[phone] Pager: [phone]

## 2023-05-03 ENCOUNTER — NURSE TRIAGE (OUTPATIENT)
Dept: ONCOLOGY | Facility: CLINIC | Age: 23
End: 2023-05-03
Payer: COMMERCIAL

## 2023-05-03 ENCOUNTER — INFUSION THERAPY VISIT (OUTPATIENT)
Dept: ONCOLOGY | Facility: CLINIC | Age: 23
End: 2023-05-03
Payer: COMMERCIAL

## 2023-05-03 ENCOUNTER — APPOINTMENT (OUTPATIENT)
Dept: LAB | Facility: CLINIC | Age: 23
End: 2023-05-03
Payer: COMMERCIAL

## 2023-05-03 VITALS
WEIGHT: 164.9 LBS | HEART RATE: 102 BPM | SYSTOLIC BLOOD PRESSURE: 138 MMHG | RESPIRATION RATE: 16 BRPM | TEMPERATURE: 98.1 F | OXYGEN SATURATION: 97 % | BODY MASS INDEX: 28.31 KG/M2 | DIASTOLIC BLOOD PRESSURE: 82 MMHG

## 2023-05-03 DIAGNOSIS — D57.1 HB-SS DISEASE WITHOUT CRISIS (H): Primary | ICD-10-CM

## 2023-05-03 DIAGNOSIS — G81.10 SPASTIC HEMIPLEGIA, UNSPECIFIED ETIOLOGY, UNSPECIFIED LATERALITY (H): ICD-10-CM

## 2023-05-03 DIAGNOSIS — D57.00 SICKLE CELL PAIN CRISIS (H): ICD-10-CM

## 2023-05-03 DIAGNOSIS — E83.111 IRON OVERLOAD DUE TO REPEATED RED BLOOD CELL TRANSFUSIONS: ICD-10-CM

## 2023-05-03 LAB
ALBUMIN SERPL BCG-MCNC: 4.2 G/DL (ref 3.5–5.2)
ALP SERPL-CCNC: 68 U/L (ref 35–104)
ALT SERPL W P-5'-P-CCNC: 25 U/L (ref 10–35)
ANION GAP SERPL CALCULATED.3IONS-SCNC: 9 MMOL/L (ref 7–15)
AST SERPL W P-5'-P-CCNC: 35 U/L (ref 10–35)
BASOPHILS # BLD AUTO: 0.2 10E3/UL (ref 0–0.2)
BASOPHILS NFR BLD AUTO: 1 %
BILIRUB SERPL-MCNC: 2.3 MG/DL
BUN SERPL-MCNC: 5.4 MG/DL (ref 6–20)
CALCIUM SERPL-MCNC: 8.8 MG/DL (ref 8.6–10)
CHLORIDE SERPL-SCNC: 109 MMOL/L (ref 98–107)
CREAT SERPL-MCNC: 0.48 MG/DL (ref 0.51–0.95)
DEPRECATED HCO3 PLAS-SCNC: 21 MMOL/L (ref 22–29)
EOSINOPHIL # BLD AUTO: 0.5 10E3/UL (ref 0–0.7)
EOSINOPHIL NFR BLD AUTO: 5 %
ERYTHROCYTE [DISTWIDTH] IN BLOOD BY AUTOMATED COUNT: 24.5 % (ref 10–15)
FERRITIN SERPL-MCNC: 5135 NG/ML (ref 6–175)
GFR SERPL CREATININE-BSD FRML MDRD: >90 ML/MIN/1.73M2
GLUCOSE SERPL-MCNC: 105 MG/DL (ref 70–99)
HCT VFR BLD AUTO: 19.2 % (ref 35–47)
HGB BLD-MCNC: 6.5 G/DL (ref 11.7–15.7)
IMM GRANULOCYTES # BLD: 0.1 10E3/UL
IMM GRANULOCYTES NFR BLD: 1 %
LYMPHOCYTES # BLD AUTO: 1.3 10E3/UL (ref 0.8–5.3)
LYMPHOCYTES NFR BLD AUTO: 12 %
MCH RBC QN AUTO: 29.5 PG (ref 26.5–33)
MCHC RBC AUTO-ENTMCNC: 33.9 G/DL (ref 31.5–36.5)
MCV RBC AUTO: 87 FL (ref 78–100)
MONOCYTES # BLD AUTO: 0.8 10E3/UL (ref 0–1.3)
MONOCYTES NFR BLD AUTO: 8 %
NEUTROPHILS # BLD AUTO: 7.8 10E3/UL (ref 1.6–8.3)
NEUTROPHILS NFR BLD AUTO: 73 %
NRBC # BLD AUTO: 0.3 10E3/UL
NRBC BLD AUTO-RTO: 3 /100
PLATELET # BLD AUTO: 490 10E3/UL (ref 150–450)
POTASSIUM SERPL-SCNC: 3.6 MMOL/L (ref 3.4–5.3)
PROT SERPL-MCNC: 7.3 G/DL (ref 6.4–8.3)
RBC # BLD AUTO: 2.2 10E6/UL (ref 3.8–5.2)
RETICS # AUTO: 0.48 10E6/UL (ref 0.03–0.1)
RETICS/RBC NFR AUTO: 22 % (ref 0.5–2)
SODIUM SERPL-SCNC: 139 MMOL/L (ref 136–145)
WBC # BLD AUTO: 10.6 10E3/UL (ref 4–11)

## 2023-05-03 PROCEDURE — 85045 AUTOMATED RETICULOCYTE COUNT: CPT

## 2023-05-03 PROCEDURE — 96361 HYDRATE IV INFUSION ADD-ON: CPT

## 2023-05-03 PROCEDURE — 96376 TX/PRO/DX INJ SAME DRUG ADON: CPT

## 2023-05-03 PROCEDURE — 250N000011 HC RX IP 250 OP 636: Performed by: PEDIATRICS

## 2023-05-03 PROCEDURE — 36591 DRAW BLOOD OFF VENOUS DEVICE: CPT

## 2023-05-03 PROCEDURE — 96365 THER/PROPH/DIAG IV INF INIT: CPT

## 2023-05-03 PROCEDURE — 82728 ASSAY OF FERRITIN: CPT

## 2023-05-03 PROCEDURE — 250N000011 HC RX IP 250 OP 636: Performed by: REGISTERED NURSE

## 2023-05-03 PROCEDURE — 85025 COMPLETE CBC W/AUTO DIFF WBC: CPT

## 2023-05-03 PROCEDURE — 96375 TX/PRO/DX INJ NEW DRUG ADDON: CPT

## 2023-05-03 PROCEDURE — 258N000003 HC RX IP 258 OP 636: Performed by: PEDIATRICS

## 2023-05-03 PROCEDURE — 80053 COMPREHEN METABOLIC PANEL: CPT

## 2023-05-03 RX ORDER — HEPARIN SODIUM (PORCINE) LOCK FLUSH IV SOLN 100 UNIT/ML 100 UNIT/ML
5 SOLUTION INTRAVENOUS
Status: CANCELLED | OUTPATIENT
Start: 2023-07-01

## 2023-05-03 RX ORDER — ALBUTEROL SULFATE 90 UG/1
1-2 AEROSOL, METERED RESPIRATORY (INHALATION)
Status: CANCELLED
Start: 2023-05-17

## 2023-05-03 RX ORDER — HEPARIN SODIUM (PORCINE) LOCK FLUSH IV SOLN 100 UNIT/ML 100 UNIT/ML
5 SOLUTION INTRAVENOUS
Status: CANCELLED | OUTPATIENT
Start: 2023-05-17

## 2023-05-03 RX ORDER — DIPHENHYDRAMINE HCL 25 MG
25 CAPSULE ORAL
Status: CANCELLED
Start: 2023-07-01

## 2023-05-03 RX ORDER — HEPARIN SODIUM,PORCINE 10 UNIT/ML
5 VIAL (ML) INTRAVENOUS
Status: CANCELLED | OUTPATIENT
Start: 2023-07-01

## 2023-05-03 RX ORDER — ONDANSETRON 4 MG/1
8 TABLET, FILM COATED ORAL
Status: CANCELLED
Start: 2023-07-01

## 2023-05-03 RX ORDER — HEPARIN SODIUM (PORCINE) LOCK FLUSH IV SOLN 100 UNIT/ML 100 UNIT/ML
5 SOLUTION INTRAVENOUS ONCE
Status: COMPLETED | OUTPATIENT
Start: 2023-05-03 | End: 2023-05-03

## 2023-05-03 RX ORDER — HEPARIN SODIUM (PORCINE) LOCK FLUSH IV SOLN 100 UNIT/ML 100 UNIT/ML
5 SOLUTION INTRAVENOUS
Status: DISCONTINUED | OUTPATIENT
Start: 2023-05-03 | End: 2023-05-03 | Stop reason: HOSPADM

## 2023-05-03 RX ORDER — HEPARIN SODIUM,PORCINE 10 UNIT/ML
5 VIAL (ML) INTRAVENOUS
Status: CANCELLED | OUTPATIENT
Start: 2023-05-17

## 2023-05-03 RX ORDER — HEPARIN SODIUM,PORCINE 10 UNIT/ML
5 VIAL (ML) INTRAVENOUS
Status: DISCONTINUED | OUTPATIENT
Start: 2023-05-03 | End: 2023-05-03 | Stop reason: HOSPADM

## 2023-05-03 RX ORDER — METHYLPREDNISOLONE SODIUM SUCCINATE 125 MG/2ML
125 INJECTION, POWDER, LYOPHILIZED, FOR SOLUTION INTRAMUSCULAR; INTRAVENOUS
Status: CANCELLED
Start: 2023-05-17

## 2023-05-03 RX ORDER — DIPHENHYDRAMINE HYDROCHLORIDE 50 MG/ML
50 INJECTION INTRAMUSCULAR; INTRAVENOUS
Status: CANCELLED
Start: 2023-05-17

## 2023-05-03 RX ORDER — EPINEPHRINE 1 MG/ML
0.3 INJECTION, SOLUTION INTRAMUSCULAR; SUBCUTANEOUS EVERY 5 MIN PRN
Status: CANCELLED | OUTPATIENT
Start: 2023-05-17

## 2023-05-03 RX ORDER — MEPERIDINE HYDROCHLORIDE 25 MG/ML
25 INJECTION INTRAMUSCULAR; INTRAVENOUS; SUBCUTANEOUS EVERY 30 MIN PRN
Status: CANCELLED | OUTPATIENT
Start: 2023-05-17

## 2023-05-03 RX ORDER — ALBUTEROL SULFATE 0.83 MG/ML
2.5 SOLUTION RESPIRATORY (INHALATION)
Status: CANCELLED | OUTPATIENT
Start: 2023-05-17

## 2023-05-03 RX ADMIN — HYDROMORPHONE HYDROCHLORIDE 1 MG: 1 INJECTION, SOLUTION INTRAMUSCULAR; INTRAVENOUS; SUBCUTANEOUS at 15:13

## 2023-05-03 RX ADMIN — SODIUM CHLORIDE 250 ML: 9 INJECTION, SOLUTION INTRAVENOUS at 12:51

## 2023-05-03 RX ADMIN — HYDROMORPHONE HYDROCHLORIDE 1 MG: 1 INJECTION, SOLUTION INTRAMUSCULAR; INTRAVENOUS; SUBCUTANEOUS at 12:51

## 2023-05-03 RX ADMIN — SODIUM CHLORIDE, POTASSIUM CHLORIDE, SODIUM LACTATE AND CALCIUM CHLORIDE 1000 ML: 600; 310; 30; 20 INJECTION, SOLUTION INTRAVENOUS at 14:10

## 2023-05-03 RX ADMIN — HYDROMORPHONE HYDROCHLORIDE 1 MG: 1 INJECTION, SOLUTION INTRAMUSCULAR; INTRAVENOUS; SUBCUTANEOUS at 14:12

## 2023-05-03 RX ADMIN — Medication 5 ML: at 12:01

## 2023-05-03 RX ADMIN — SODIUM CHLORIDE 400 MG: 9 INJECTION, SOLUTION INTRAVENOUS at 13:07

## 2023-05-03 RX ADMIN — Medication 5 ML: at 15:15

## 2023-05-03 ASSESSMENT — PAIN SCALES - GENERAL: PAINLEVEL: NO PAIN (0)

## 2023-05-03 NOTE — PROGRESS NOTES
Infusion Nursing Note:  Jennifer Cervantes presents today for crizanlizumab-tmca (ADAKVEO) #1-IVF-Pain Meds.    Patient seen by provider today: No   present during visit today: Not Applicable.    Note: ~~~ NOTE: If the patient answers yes to any of the questions below, hold the infusion and contact ordering provider or on-call provider.    1. Have you recently had an elevated temperature, fever, chills, productive cough, coughing for 3 weeks or longer or hemoptysis,  abnormal vital signs, night sweats,  chest pain or have you noticed a decrease in your appetite, unexplained weight loss or fatigue? No  2. Do you have any open wounds or new incisions? No  3. Do you have any upcoming hospitalizations or surgeries? Does not include esophagogastroduodenoscopy, colonoscopy, endoscopic retrograde cholangiopancreatography (ERCP), endoscopic ultrasound (EUS), dental procedures or joint aspiration/steroid injections No  4. Do you currently have any signs of illness or infection or are you on any antibiotics? No  5. Have you had any new, sudden or worsening abdominal pain? No  6. Have you or anyone in your household received a live vaccination in the past 4 weeks? Please note: No live vaccines while on biologic/chemotherapy until 6 months after the last treatment. Patient can receive the flu vaccine (shot only), pneumovax and the Covid vaccine. It is optimal for the patient to get these vaccines mid cycle, but they can be given at any time as long as it is not on the day of the infusion. No  7. Have you recently been diagnosed with any new nervous system diseases (ie. Multiple sclerosis, Guillain [place], seizures, neurological changes) or cancer diagnosis? Are you on any form of radiation or chemotherapy? No  8. Are you pregnant or breast feeding or do you have plans of pregnancy in the future? No  9. Have you been having any signs of worsening depression or suicidal ideations?  (benlysta only) No  10. Have there been any  other new onset medical symptoms? No  11. Have you had any new blood clots? (IVIG only) No    -per pt report, restarting Jr due to decrease in PO Oxycodone at home  -has done OK with Jr previously  -requesting pan medications for 8/10 R side neck pain  -HGB 6.5, parameters say to call provider if <7.0 for plan    TORB 5/3/23@1400 Patricia Mantilla NP-Patricia Coronado RN  -recheck labs on Friday 5/5 to determine need for transfusion    IB sent to scheduling for appt  IB sent to Arely Krueger RNCC to follow up with results on Friday    Prior to discharge pain 3/10, feels ok to discharge home.    Intravenous Access:  Implanted Port.    Treatment Conditions:  Lab Results   Component Value Date    HGB 6.5 (LL) 05/03/2023    WBC 10.6 05/03/2023    ANEU 5.8 12/23/2022    ANEUTAUTO 7.8 05/03/2023     (H) 05/03/2023      Lab Results   Component Value Date     05/03/2023    POTASSIUM 3.6 05/03/2023    MAG 2.0 11/11/2021    CR 0.48 (L) 05/03/2023    MICAH 8.8 05/03/2023    BILITOTAL 2.3 (H) 05/03/2023    ALBUMIN 4.2 05/03/2023    ALT 25 05/03/2023    AST 35 05/03/2023         Post Infusion Assessment:  Patient tolerated infusion without incident.  Patient observed for 30 minutes post crizanlizumab-tmca (ADAKVEO) per protocol.  Blood return noted pre and post infusion.  Site patent and intact, free from redness, edema or discomfort.  No evidence of extravasations.  Access discontinued per protocol.       Discharge Plan:   Patient declined prescription refills.  Discharge instructions reviewed with: Patient.  Patient and/or family verbalized understanding of discharge instructions and all questions answered.  AVS to patient via [employer].  Patient will return 5/5 for labs (not scheduled prior to discharge, pt is aware to follow up) and 5/8 for provider visit.   Patient discharged in stable condition accompanied by: self.  Departure Mode: Ambulatory.    Patricia Coronado RN

## 2023-05-03 NOTE — TELEPHONE ENCOUNTER
Call from pt, who states she has labs and Jr infusion today, wondering if she can get IVF/pain meds added on to current infusion.     Patient's last infusion was 5/1/23  Last clinic visit date: 4/14/23   Next follow-up appt on 5/8/23   Does patient have active treatment plan?  Yes    Message sent to Patricia Soriano at 1032, as pt was in last on 5/1/23 for infusion and needs provider approval.   1037 Per Melissa charge nurse, infusion can add on IVF/pain meds if approved by provider  1045 per Patricia Mantilla, pt can have IVF/fluids today but that would be pt's 2 infusions/week.   Call to pt, who states she will wait until tomorrow to get on infusion wait list and voiced understanding of 2 infusions per week limit. Pt still planning to come for Jr infusion today and understands this is a 4 hour infusion.  Charge nurse updated at 3587.

## 2023-05-05 ENCOUNTER — NURSE TRIAGE (OUTPATIENT)
Dept: ONCOLOGY | Facility: CLINIC | Age: 23
End: 2023-05-05

## 2023-05-05 ENCOUNTER — PATIENT OUTREACH (OUTPATIENT)
Dept: ONCOLOGY | Facility: CLINIC | Age: 23
End: 2023-05-05

## 2023-05-05 ENCOUNTER — LAB (OUTPATIENT)
Dept: LAB | Facility: CLINIC | Age: 23
End: 2023-05-05
Attending: PEDIATRICS
Payer: COMMERCIAL

## 2023-05-05 DIAGNOSIS — Z86.73 HISTORY OF STROKE: ICD-10-CM

## 2023-05-05 DIAGNOSIS — E83.111 IRON OVERLOAD DUE TO REPEATED RED BLOOD CELL TRANSFUSIONS: ICD-10-CM

## 2023-05-05 DIAGNOSIS — D57.1 HB-SS DISEASE WITHOUT CRISIS (H): Primary | ICD-10-CM

## 2023-05-05 LAB
ABO/RH(D): NORMAL
ALBUMIN SERPL BCG-MCNC: 4.3 G/DL (ref 3.5–5.2)
ALP SERPL-CCNC: 67 U/L (ref 35–104)
ALT SERPL W P-5'-P-CCNC: 20 U/L (ref 10–35)
ANION GAP SERPL CALCULATED.3IONS-SCNC: 10 MMOL/L (ref 7–15)
ANTIBODY SCREEN: NEGATIVE
AST SERPL W P-5'-P-CCNC: 31 U/L (ref 10–35)
BASOPHILS # BLD MANUAL: 0 10E3/UL (ref 0–0.2)
BASOPHILS NFR BLD MANUAL: 0 %
BILIRUB SERPL-MCNC: 2.4 MG/DL
BUN SERPL-MCNC: 4.3 MG/DL (ref 6–20)
BURR CELLS BLD QL SMEAR: SLIGHT
CALCIUM SERPL-MCNC: 9.2 MG/DL (ref 8.6–10)
CHLORIDE SERPL-SCNC: 108 MMOL/L (ref 98–107)
CREAT SERPL-MCNC: 0.47 MG/DL (ref 0.51–0.95)
DEPRECATED HCO3 PLAS-SCNC: 21 MMOL/L (ref 22–29)
EOSINOPHIL # BLD MANUAL: 0.6 10E3/UL (ref 0–0.7)
EOSINOPHIL NFR BLD MANUAL: 4 %
ERYTHROCYTE [DISTWIDTH] IN BLOOD BY AUTOMATED COUNT: 24.9 % (ref 10–15)
FERRITIN SERPL-MCNC: 4639 NG/ML (ref 6–175)
FRAGMENTS BLD QL SMEAR: SLIGHT
GFR SERPL CREATININE-BSD FRML MDRD: >90 ML/MIN/1.73M2
GLUCOSE SERPL-MCNC: 90 MG/DL (ref 70–99)
HCT VFR BLD AUTO: 20 % (ref 35–47)
HGB BLD-MCNC: 6.6 G/DL (ref 11.7–15.7)
LYMPHOCYTES # BLD MANUAL: 2.8 10E3/UL (ref 0.8–5.3)
LYMPHOCYTES NFR BLD MANUAL: 19 %
MCH RBC QN AUTO: 28.7 PG (ref 26.5–33)
MCHC RBC AUTO-ENTMCNC: 33 G/DL (ref 31.5–36.5)
MCV RBC AUTO: 87 FL (ref 78–100)
MONOCYTES # BLD MANUAL: 0.7 10E3/UL (ref 0–1.3)
MONOCYTES NFR BLD MANUAL: 5 %
NEUTROPHILS # BLD MANUAL: 10.7 10E3/UL (ref 1.6–8.3)
NEUTROPHILS NFR BLD MANUAL: 72 %
NRBC # BLD AUTO: 0.6 10E3/UL
NRBC BLD MANUAL-RTO: 4 %
PLAT MORPH BLD: ABNORMAL
PLATELET # BLD AUTO: 577 10E3/UL (ref 150–450)
POLYCHROMASIA BLD QL SMEAR: SLIGHT
POTASSIUM SERPL-SCNC: 3.4 MMOL/L (ref 3.4–5.3)
PROT SERPL-MCNC: 7.4 G/DL (ref 6.4–8.3)
RBC # BLD AUTO: 2.3 10E6/UL (ref 3.8–5.2)
RBC MORPH BLD: ABNORMAL
RETICS # AUTO: 0.51 10E6/UL (ref 0.03–0.1)
RETICS/RBC NFR AUTO: 22.1 % (ref 0.5–2)
SICKLE CELLS BLD QL SMEAR: ABNORMAL
SODIUM SERPL-SCNC: 139 MMOL/L (ref 136–145)
SPECIMEN EXPIRATION DATE: NORMAL
WBC # BLD AUTO: 14.9 10E3/UL (ref 4–11)

## 2023-05-05 PROCEDURE — 36591 DRAW BLOOD OFF VENOUS DEVICE: CPT

## 2023-05-05 PROCEDURE — 82728 ASSAY OF FERRITIN: CPT

## 2023-05-05 PROCEDURE — 85045 AUTOMATED RETICULOCYTE COUNT: CPT

## 2023-05-05 PROCEDURE — 80053 COMPREHEN METABOLIC PANEL: CPT

## 2023-05-05 PROCEDURE — 85027 COMPLETE CBC AUTOMATED: CPT

## 2023-05-05 PROCEDURE — 86901 BLOOD TYPING SEROLOGIC RH(D): CPT | Performed by: PEDIATRICS

## 2023-05-05 PROCEDURE — 250N000011 HC RX IP 250 OP 636: Performed by: PEDIATRICS

## 2023-05-05 PROCEDURE — 86850 RBC ANTIBODY SCREEN: CPT | Performed by: PEDIATRICS

## 2023-05-05 PROCEDURE — 85007 BL SMEAR W/DIFF WBC COUNT: CPT

## 2023-05-05 RX ORDER — HEPARIN SODIUM (PORCINE) LOCK FLUSH IV SOLN 100 UNIT/ML 100 UNIT/ML
5 SOLUTION INTRAVENOUS
Status: CANCELLED | OUTPATIENT
Start: 2023-05-05

## 2023-05-05 RX ORDER — HEPARIN SODIUM (PORCINE) LOCK FLUSH IV SOLN 100 UNIT/ML 100 UNIT/ML
5 SOLUTION INTRAVENOUS ONCE
Status: COMPLETED | OUTPATIENT
Start: 2023-05-05 | End: 2023-05-05

## 2023-05-05 RX ORDER — HEPARIN SODIUM,PORCINE 10 UNIT/ML
5 VIAL (ML) INTRAVENOUS
Status: CANCELLED | OUTPATIENT
Start: 2023-05-05

## 2023-05-05 RX ADMIN — Medication 5 ML: at 08:47

## 2023-05-05 NOTE — NURSING NOTE
"Chief Complaint   Patient presents with     Port Draw     Labs drawn via port by RN.      Labs drawn via port by RN. Port accessed with 20G 3/4\" power needle. Flushed with NS and heparin. De-accessed. Pt tolerated well.    Rin Mckeon RN  "

## 2023-05-05 NOTE — PROGRESS NOTES
[facility]: Cancer Care Short Note                                                                                          RNCC received message to follow-up with patient on Hgb level of 6.6 today. Per ANDREAS Patricia Mantilla, patient can be scheduled for transfusion this weekend, if there is availability. Per infusion charge, currently on a waitlist this weekend. Will get patient on waitlist and follow-up if availability.    RNCC called patient who stated she was feeling tired, SOB, and cold today. RNCC informed patient that we will try to get her in for a transfusion this weekend and she will be contacted by scheduling if she can get in. If not, to follow-up with ANDREAS at Monday's appointment. If begin to feel worse over the weekend to call triage or go to ED. Patient understood and had no further questions or concerns.     Patient to follow up as scheduled at next appt.    SHALOM NegronN, RN  RN Care Coordinator   [phone]

## 2023-05-05 NOTE — TELEPHONE ENCOUNTER
DATE:  5/5/2023   TIME OF RECEIPT FROM LAB:  0950  LAB TEST:  Hgb 6.6  WBC 14.9  Plt 577  Last LAB VALUE:  05/03/23 Hgb 6.5  WBC 10.6  Plt 490  TIME LAB VALUE REPORTED TO PROVIDER:   0956 message sent to Patricia Mantilla   0996 Patricia Mantilla acknowledged critical lab

## 2023-05-08 ENCOUNTER — ONCOLOGY VISIT (OUTPATIENT)
Dept: ONCOLOGY | Facility: CLINIC | Age: 23
End: 2023-05-08
Attending: REGISTERED NURSE
Payer: COMMERCIAL

## 2023-05-08 ENCOUNTER — ANCILLARY PROCEDURE (OUTPATIENT)
Dept: GENERAL RADIOLOGY | Facility: CLINIC | Age: 23
End: 2023-05-08
Attending: REGISTERED NURSE
Payer: COMMERCIAL

## 2023-05-08 ENCOUNTER — APPOINTMENT (OUTPATIENT)
Dept: LAB | Facility: CLINIC | Age: 23
End: 2023-05-08
Attending: REGISTERED NURSE
Payer: COMMERCIAL

## 2023-05-08 ENCOUNTER — NURSE TRIAGE (OUTPATIENT)
Dept: ONCOLOGY | Facility: CLINIC | Age: 23
End: 2023-05-08

## 2023-05-08 VITALS
TEMPERATURE: 98.9 F | SYSTOLIC BLOOD PRESSURE: 127 MMHG | DIASTOLIC BLOOD PRESSURE: 77 MMHG | RESPIRATION RATE: 16 BRPM | HEART RATE: 110 BPM | OXYGEN SATURATION: 97 %

## 2023-05-08 DIAGNOSIS — D57.00 SICKLE CELL PAIN CRISIS (H): ICD-10-CM

## 2023-05-08 DIAGNOSIS — D72.829 LEUKOCYTOSIS, UNSPECIFIED TYPE: ICD-10-CM

## 2023-05-08 DIAGNOSIS — R06.02 SHORTNESS OF BREATH: ICD-10-CM

## 2023-05-08 DIAGNOSIS — E83.111 IRON OVERLOAD DUE TO REPEATED RED BLOOD CELL TRANSFUSIONS: ICD-10-CM

## 2023-05-08 DIAGNOSIS — G81.10 SPASTIC HEMIPLEGIA, UNSPECIFIED ETIOLOGY, UNSPECIFIED LATERALITY (H): Primary | ICD-10-CM

## 2023-05-08 DIAGNOSIS — D57.1 HB-SS DISEASE WITHOUT CRISIS (H): Primary | ICD-10-CM

## 2023-05-08 DIAGNOSIS — J30.2 SEASONAL ALLERGIES: ICD-10-CM

## 2023-05-08 DIAGNOSIS — D57.1 HB-SS DISEASE WITHOUT CRISIS (H): ICD-10-CM

## 2023-05-08 DIAGNOSIS — I82.611 SUPERFICIAL VENOUS THROMBOSIS OF ARM, RIGHT: ICD-10-CM

## 2023-05-08 DIAGNOSIS — J45.909 ASTHMATIC BRONCHITIS WITHOUT COMPLICATION, UNSPECIFIED ASTHMA SEVERITY, UNSPECIFIED WHETHER PERSISTENT: ICD-10-CM

## 2023-05-08 LAB
ACANTHOCYTES BLD QL SMEAR: SLIGHT
ALBUMIN SERPL BCG-MCNC: 4.7 G/DL (ref 3.5–5.2)
ALP SERPL-CCNC: 82 U/L (ref 35–104)
ALT SERPL W P-5'-P-CCNC: 16 U/L (ref 10–35)
ANION GAP SERPL CALCULATED.3IONS-SCNC: 11 MMOL/L (ref 7–15)
AST SERPL W P-5'-P-CCNC: 26 U/L (ref 10–35)
BASOPHILS # BLD AUTO: 0.3 10E3/UL (ref 0–0.2)
BASOPHILS NFR BLD AUTO: 1 %
BILIRUB SERPL-MCNC: 3.1 MG/DL
BUN SERPL-MCNC: 7.5 MG/DL (ref 6–20)
CALCIUM SERPL-MCNC: 9.6 MG/DL (ref 8.6–10)
CHLORIDE SERPL-SCNC: 104 MMOL/L (ref 98–107)
CREAT SERPL-MCNC: 0.53 MG/DL (ref 0.51–0.95)
DEPRECATED HCO3 PLAS-SCNC: 21 MMOL/L (ref 22–29)
EOSINOPHIL # BLD AUTO: 1.5 10E3/UL (ref 0–0.7)
EOSINOPHIL NFR BLD AUTO: 7 %
ERYTHROCYTE [DISTWIDTH] IN BLOOD BY AUTOMATED COUNT: 24.4 % (ref 10–15)
GFR SERPL CREATININE-BSD FRML MDRD: >90 ML/MIN/1.73M2
GLUCOSE SERPL-MCNC: 87 MG/DL (ref 70–99)
HCT VFR BLD AUTO: 22.4 % (ref 35–47)
HGB BLD-MCNC: 7.6 G/DL (ref 11.7–15.7)
IMM GRANULOCYTES # BLD: 0.1 10E3/UL
IMM GRANULOCYTES NFR BLD: 1 %
LYMPHOCYTES # BLD AUTO: 1.4 10E3/UL (ref 0.8–5.3)
LYMPHOCYTES NFR BLD AUTO: 6 %
MCH RBC QN AUTO: 29.1 PG (ref 26.5–33)
MCHC RBC AUTO-ENTMCNC: 33.9 G/DL (ref 31.5–36.5)
MCV RBC AUTO: 86 FL (ref 78–100)
MONOCYTES # BLD AUTO: 1 10E3/UL (ref 0–1.3)
MONOCYTES NFR BLD AUTO: 5 %
NEUTROPHILS # BLD AUTO: 17.1 10E3/UL (ref 1.6–8.3)
NEUTROPHILS NFR BLD AUTO: 80 %
NRBC # BLD AUTO: 0.4 10E3/UL
NRBC BLD AUTO-RTO: 2 /100
PLAT MORPH BLD: ABNORMAL
PLATELET # BLD AUTO: 649 10E3/UL (ref 150–450)
POLYCHROMASIA BLD QL SMEAR: SLIGHT
POTASSIUM SERPL-SCNC: 4.1 MMOL/L (ref 3.4–5.3)
PROT SERPL-MCNC: 8.4 G/DL (ref 6.4–8.3)
RBC # BLD AUTO: 2.61 10E6/UL (ref 3.8–5.2)
RBC MORPH BLD: ABNORMAL
RETICS # AUTO: 0.5 10E6/UL (ref 0.03–0.1)
RETICS/RBC NFR AUTO: 19.9 % (ref 0.5–2)
SICKLE CELLS BLD QL SMEAR: ABNORMAL
SODIUM SERPL-SCNC: 136 MMOL/L (ref 136–145)
TARGETS BLD QL SMEAR: SLIGHT
WBC # BLD AUTO: 21.3 10E3/UL (ref 4–11)

## 2023-05-08 PROCEDURE — 96376 TX/PRO/DX INJ SAME DRUG ADON: CPT

## 2023-05-08 PROCEDURE — 258N000003 HC RX IP 258 OP 636: Performed by: PEDIATRICS

## 2023-05-08 PROCEDURE — 96374 THER/PROPH/DIAG INJ IV PUSH: CPT

## 2023-05-08 PROCEDURE — G0463 HOSPITAL OUTPT CLINIC VISIT: HCPCS | Mod: 25 | Performed by: REGISTERED NURSE

## 2023-05-08 PROCEDURE — 250N000011 HC RX IP 250 OP 636: Performed by: PEDIATRICS

## 2023-05-08 PROCEDURE — 80053 COMPREHEN METABOLIC PANEL: CPT | Performed by: REGISTERED NURSE

## 2023-05-08 PROCEDURE — 36591 DRAW BLOOD OFF VENOUS DEVICE: CPT | Performed by: REGISTERED NURSE

## 2023-05-08 PROCEDURE — 85025 COMPLETE CBC W/AUTO DIFF WBC: CPT | Performed by: REGISTERED NURSE

## 2023-05-08 PROCEDURE — 85045 AUTOMATED RETICULOCYTE COUNT: CPT | Performed by: REGISTERED NURSE

## 2023-05-08 PROCEDURE — 71046 X-RAY EXAM CHEST 2 VIEWS: CPT | Performed by: RADIOLOGY

## 2023-05-08 PROCEDURE — 99215 OFFICE O/P EST HI 40 MIN: CPT | Performed by: REGISTERED NURSE

## 2023-05-08 PROCEDURE — 96361 HYDRATE IV INFUSION ADD-ON: CPT

## 2023-05-08 PROCEDURE — 250N000011 HC RX IP 250 OP 636: Performed by: REGISTERED NURSE

## 2023-05-08 RX ORDER — DEFERASIROX 360 MG/1
1440 TABLET, FILM COATED ORAL EVERY EVENING
Qty: 120 TABLET | Refills: 4 | Status: SHIPPED | OUTPATIENT
Start: 2023-05-08 | End: 2023-06-05

## 2023-05-08 RX ORDER — ALBUTEROL SULFATE 90 UG/1
2 AEROSOL, METERED RESPIRATORY (INHALATION) EVERY 6 HOURS PRN
Qty: 8.5 G | Refills: 3 | Status: SHIPPED | OUTPATIENT
Start: 2023-05-08 | End: 2023-11-27

## 2023-05-08 RX ORDER — ALBUTEROL SULFATE 0.83 MG/ML
5 SOLUTION RESPIRATORY (INHALATION) EVERY 6 HOURS PRN
Qty: 90 ML | Refills: 3 | Status: SHIPPED | OUTPATIENT
Start: 2023-05-08 | End: 2023-10-02

## 2023-05-08 RX ORDER — HEPARIN SODIUM (PORCINE) LOCK FLUSH IV SOLN 100 UNIT/ML 100 UNIT/ML
5 SOLUTION INTRAVENOUS ONCE
Status: COMPLETED | OUTPATIENT
Start: 2023-05-08 | End: 2023-05-08

## 2023-05-08 RX ORDER — DIPHENHYDRAMINE HCL 25 MG
25 CAPSULE ORAL EVERY 6 HOURS PRN
Qty: 30 CAPSULE | Refills: 0 | Status: SHIPPED | OUTPATIENT
Start: 2023-05-08 | End: 2023-06-05

## 2023-05-08 RX ORDER — HEPARIN SODIUM (PORCINE) LOCK FLUSH IV SOLN 100 UNIT/ML 100 UNIT/ML
5 SOLUTION INTRAVENOUS
Status: DISCONTINUED | OUTPATIENT
Start: 2023-05-08 | End: 2023-05-08 | Stop reason: HOSPADM

## 2023-05-08 RX ORDER — HEPARIN SODIUM (PORCINE) LOCK FLUSH IV SOLN 100 UNIT/ML 100 UNIT/ML
5 SOLUTION INTRAVENOUS
Status: CANCELLED | OUTPATIENT
Start: 2023-07-01

## 2023-05-08 RX ORDER — ONDANSETRON 4 MG/1
8 TABLET, FILM COATED ORAL
Status: CANCELLED
Start: 2023-07-01

## 2023-05-08 RX ORDER — DIPHENHYDRAMINE HCL 25 MG
25 CAPSULE ORAL
Status: CANCELLED
Start: 2023-07-01

## 2023-05-08 RX ORDER — BUDESONIDE AND FORMOTEROL FUMARATE DIHYDRATE 160; 4.5 UG/1; UG/1
2 AEROSOL RESPIRATORY (INHALATION) 2 TIMES DAILY
Qty: 10.2 G | Refills: 3 | Status: SHIPPED | OUTPATIENT
Start: 2023-05-08 | End: 2023-10-02

## 2023-05-08 RX ORDER — OXYCODONE HYDROCHLORIDE 15 MG/1
15 TABLET ORAL EVERY 4 HOURS PRN
Qty: 25 TABLET | Refills: 0 | Status: ON HOLD | OUTPATIENT
Start: 2023-05-08 | End: 2023-05-16

## 2023-05-08 RX ORDER — ASPIRIN 81 MG/1
81 TABLET, CHEWABLE ORAL 2 TIMES DAILY
Qty: 60 TABLET | Refills: 11 | Status: SHIPPED | OUTPATIENT
Start: 2023-05-08 | End: 2023-06-05

## 2023-05-08 RX ORDER — HEPARIN SODIUM,PORCINE 10 UNIT/ML
5 VIAL (ML) INTRAVENOUS
Status: CANCELLED | OUTPATIENT
Start: 2023-07-01

## 2023-05-08 RX ADMIN — HYDROMORPHONE HYDROCHLORIDE 1 MG: 1 INJECTION, SOLUTION INTRAMUSCULAR; INTRAVENOUS; SUBCUTANEOUS at 16:05

## 2023-05-08 RX ADMIN — HYDROMORPHONE HYDROCHLORIDE 1 MG: 1 INJECTION, SOLUTION INTRAMUSCULAR; INTRAVENOUS; SUBCUTANEOUS at 14:16

## 2023-05-08 RX ADMIN — Medication 5 ML: at 13:38

## 2023-05-08 RX ADMIN — SODIUM CHLORIDE, POTASSIUM CHLORIDE, SODIUM LACTATE AND CALCIUM CHLORIDE 1000 ML: 600; 310; 30; 20 INJECTION, SOLUTION INTRAVENOUS at 14:16

## 2023-05-08 RX ADMIN — HYDROMORPHONE HYDROCHLORIDE 1 MG: 1 INJECTION, SOLUTION INTRAMUSCULAR; INTRAVENOUS; SUBCUTANEOUS at 15:05

## 2023-05-08 RX ADMIN — Medication 5 ML: at 16:26

## 2023-05-08 ASSESSMENT — PAIN SCALES - GENERAL: PAINLEVEL: WORST PAIN (10)

## 2023-05-08 NOTE — NURSING NOTE
"Oncology Rooming Note    May 8, 2023 1:45 PM   Jennifer Cervantes is a 23 year old female who presents for:    Chief Complaint   Patient presents with     Port Draw     Labs drawn via port by RN. VS taken.     Oncology Clinic Visit     RTN: Sickle cell anemia     Initial Vitals: /77   Pulse 110   Temp 98.9  F (37.2  C) (Oral)   Resp 16   SpO2 97%  Estimated body mass index is 28.31 kg/m  as calculated from the following:    Height as of 4/30/23: 1.626 m (5' 4\").    Weight as of 5/3/23: 74.8 kg (164 lb 14.5 oz). There is no height or weight on file to calculate BSA.  Worst Pain (10) Comment: Data Unavailable   No LMP recorded. Patient has had an injection.  Allergies reviewed: Yes  Medications reviewed: Yes    Medications: Medication refills not needed today.  Pharmacy name entered into EPIC: [facility] [place], [place] - [address] [place] [address] [phone]    Clinical concerns: Patient reports an increase in asthma attacks     Dallas Franklin            "

## 2023-05-08 NOTE — TELEPHONE ENCOUNTER
Oncology Nurse Triage - Sickle Cell Pain Crisis:    Situation: Jennifer  calling about Sickle Cell Pain Crisis    Background:     Patient's last infusion was 05/03/23  Last clinic visit date:  04/14/23 Karl Mantilla  Next follow-up appt on 05/08/23 Karl Mantilla  Does patient have active treatment plan?  Yes    (If pt has been seen in ED or infusion in last 3 days or no showed last clinic visit then does not meet protocol unless specified in pt therapy plan)    Assessment of Symptoms:  Onset/Duration of symptoms: 3 day    Is it typical sickle cell pain? Yes   Location: Left side  Character: Sharp           Intensity: 9/10    Any radiation of pain, numbness, tingling, weakness, warmth, swelling, discoloration of extremities?No     Fever?No  (if yes max temperature recorded in last 24 hours):      Chest Pain Present: No     Shortness of breath: No     Other home therapies tried: HEAT/HEATING PAD and WARM BATH     Last home medication taken and when: Oxycodone 0600    Any Refills Needed?: Oxycodone    Does patient have transportation & length of time to get to clinic: Yes       Grades for reference:     Grade 1 -   Patient has active treatment plan.   Patients last scheduled clinic appointment was attended  Patient has not been seen in infusion or ED in the last 3 days (clarify exclusions in therapy plans)   Afebrile   Typical sickle cell pain   Home medications have been tried   No other symptoms       Recommendations:   0708 Added to infusion wait list for IVF/pain meds per protocol.  1522 Unable to offer appointment due to capacity.        If you do not hear from the infusion center by 2pm then you will not be able to get in for an infusion today. If symptoms worsen while waiting for call back, and/or you experience fever, chills, SOB, chest pain, cough, n/v, dizziness, numbness, swelling, discoloration of extremities, then seek emergency evaluation in Emergency Department.     Please note, if you are late for your  appt, you risk losing your infusion appt as it may delay another patient's infusion who arrived on time.

## 2023-05-08 NOTE — NURSING NOTE
"Chief Complaint   Patient presents with     Port Draw     Labs drawn via port by RN. VS taken.     Port accessed with 20g 3/4\" power needle and labs drawn by rn.  Port flushed with NS and heparin.  Pt tolerated well.  VS taken. De-accessed. Pt checked in for next appt.    Artie Morgan RN  "

## 2023-05-08 NOTE — PROGRESS NOTES
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 She had multiple thromboembolic events in 2021 despite adherent anticoagulation use (though warfarin was perpetually low) and there are concerns for chronic thromboembolic disease but did not have pulmonary HTN on a November 2021 cath. She is maintained on chronic PO opioids and twice-weekly infusion visits (since 1/24/22) but has been able to be maintained on this regimen and has stayed out of the ED most of the time with even rarer admissions.     Interval History:  Jennifer is seen for routine follow-up today. She has been struggling with increased shortness of breath and dyspnea on exertion for the past 5-6 days. Because of this she has been using her nebulizer more often which has been helpful although doesn't provide relief for too long. She denies any fever but has felt very cold at home and has been wearing many layers of clothes when her siblings and mom don't need to. She is having increased pain today and requesting IV fluids and pain medication in infusion. Denies any focal pain in her chest.    She resumed sidney infusions last week, receiving her last infusion on 5/3/23. Since we have been tapering her oxycodone, she now uses medication every 6 hours rather than every 4 and only uses as needed. She was last in the ED on 4/30/23 for sickle cell pain crisis and particularly to investigate local swelling to her right neck and anterior chest. Prior to going to the ED she was experiencing  "pain and swelling around her right clavicle for days that made it difficult to move her arm and her neck. The only thing that was relieving her discomfort was ibuprofen. A few days ago the swelling and pain spontaneously resolved.     Sickle Cell Disease Comprehensive Checklist    Bone Health/Avascular Necrosis Screening/Symptoms (each visit): no new concerns today    Leg Ulcer evaluation (every visit): none    Hypertension (every visit):stable 3/10/23    Last pulmonary evaluation (asthma, AMAN, pulm HTN): 9/28/22    Stroke/silent cerebral infarct Hx (Y/N): Yes TIA ~2014, first event ~age 1 with full stroke and R sided weakness    Last PCP Visit: 3/6/23    Vaccines:  ? PCV13: 5/13/19  ? Pneumovax (PPSV23): 3/04, 10/09, 7/12/19 (next due 7/2024)  ? Menactra: 4/2010, 9/2015 (MCV done 8/16/21)  ? Influenza: 11/17/2022     Audiology (chelation): done 6/2020, normal.. However, on 6/7, \"While there is no significant change in grade on the CTCAE 4.03 Scale, there were hearing changes bilaterally for both standard and extended high frequency audiometry.  Will need to determine if an ENT consult is advised due to the asymmetry in extended high frequency testing.\"     Plan last reviewed with patient: 7/11/22    Patient background: 21 yo F, enjoys movies and kids though there are times where she does not really want to talk to people. Does not have a lot of social support at home.     Sickle Cell Disease History  Primary Hematologist Team: Jose Rafael Duncan  PCP: none  Genotype: SS  Acute Pain Crisis Treatment: (avoiding IV opioids for now, which she has agreed to)    ER   o Oxycodone 15-20 mg x1  o Ketamine 4mg IV x 2--helps with opioid sparing  o Toradol 15 mg IV x1   o Maintenance IV fluids with LR  o Other: Zofran 8 mg IV PRN nausea    Inpatient:  o Home oxycodone/Oxycontin regimen, though home oxycodone dosing could be increased to 20 mg to start  o PCA plan:   - None for now  o Other Medications: Zofran  o She has had " success with ketamine starting at 4mg/h and advancing only to 6mg/h, as 8mg/h made her feel quite poorly..  o ASA  o Supportive Care: Docusate, Senna  Chronic Pain Medications:    Home regimen Oxycontin 10 mg q12h, oxycodone 15 mg p.o. q.4-6 hours p.r.n. breakthrough pain.  She also continues on Voltaren gel, and Zoloft among other medications.    -Also benefits from mental health visits, acupuncture  Baseline Hemoglobin: 7 g/dl without chronic transfusions  Hydroxyurea use: Yes  H/O blood transfusions: Yes, several (iron overload) Most recent 11/20/2021    H/O Transfusion Reactions: no    Antibodies:none  H/O Acute Chest Syndrome: Yes    Last episode:9/05/22 (previously 4/26/21, 10/2019)     ICU/intubation: not with 9/2022 admission  H/O Stroke: Yes (managed with chronic transfusions in the past, stopped late Spring 2020)  H/O VTE: Yes (2/2021)  H/O Cholecystectomy or Splenectomy: no  H/O Asthma, Pulm HTN, AVN, Leg Ulcers, Nephropathy, Retinopathy, etc: Iron overload, asthma, chronic lung disease, physical limitations from early stroke    ---------------------------------------  Jennifer Cervantes's Goals (last discussed 3/16/23, did not discuss in detail today)    1-3 month goal:  Look for a job    6 month goal:      12 month goal:  Move into her own place     Disease-specific goal(s):  Continue to wean oxycodone down every 3-4 weeks  ---------------------------------------      Current Outpatient Medications   Medication Sig Dispense Refill     acetaminophen (TYLENOL) 325 MG tablet Take 2 tablets (650 mg) by mouth every 6 hours as needed for mild pain 120 tablet 3     aspirin (ASA) 81 MG chewable tablet Take 1 tablet (81 mg) by mouth 2 times daily 60 tablet 11     deferasirox (JADENU) 360 MG tablet Take 4 tablets (1,440 mg) by mouth every evening 120 tablet 4     diclofenac (VOLTAREN) 1 % topical gel Apply 4 g topically 4 times daily 350 g 0     diphenhydrAMINE (BENADRYL) 25 MG capsule Take 1 capsule (25 mg) by mouth  every 6 hours as needed for itching or allergies 30 capsule 0     FLUoxetine (PROZAC) 10 MG capsule Take 1 capsule (10 mg) by mouth daily For two weeks, then increase to 20 mg if 10 mg not effective 40 capsule 1     folic acid (FOLVITE) 1 MG tablet Take 1 tablet (1 mg) by mouth daily 30 tablet 0     Hydroxyurea 1000 MG TABS Take 3,000 mg by mouth daily 90 tablet 3     ondansetron (ZOFRAN) 8 MG tablet Take 1 tablet (8 mg) by mouth every 8 hours as needed 30 tablet 1     traZODone (DESYREL) 50 MG tablet Take 1 tablet (50 mg) by mouth At Bedtime 30 tablet 1     albuterol (PROAIR HFA/PROVENTIL HFA/VENTOLIN HFA) 108 (90 Base) MCG/ACT inhaler Inhale 2 puffs into the lungs every 6 hours as needed for shortness of breath or wheezing 8.5 g 3     albuterol (PROVENTIL) (2.5 MG/3ML) 0.083% neb solution Take 2 vials (5 mg) by nebulization every 6 hours as needed for shortness of breath or wheezing 90 mL 3     budesonide-formoterol (SYMBICORT) 160-4.5 MCG/ACT Inhaler Inhale 2 puffs into the lungs 2 times daily 10.2 g 3     EPINEPHrine (ANY BX GENERIC EQUIV) 0.3 MG/0.3ML injection 2-pack Inject 0.3 mLs (0.3 mg) into the muscle as needed for anaphylaxis (Patient not taking: Reported on 3/10/2023) 1 each 1     naloxone (NARCAN) 4 MG/0.1ML nasal spray Spray 4 mg into one nostril alternating nostrils as needed for opioid reversal every 2-3 minutes until assistance arrives (Patient not taking: Reported on 4/17/2023)       oxyCODONE IR (ROXICODONE) 15 MG tablet Take 1 tablet (15 mg) by mouth every 4 hours as needed for severe pain Goal 4 per day. Max 6 per day. 25 tablet 0       Past Medical History  Past Medical History:   Diagnosis Date     Anxiety      Bleeding disorder (H)      Blood clotting disorder (H)      Cerebral infarction (H) 2015     Cognitive developmental delay     low IQ. Please recognize when managing pain and planning with her     Depressive disorder      Hemiplegia and hemiparesis following cerebral infarction  affecting right dominant side (H)     right hand contractures     Iron overload due to repeated red blood cell transfusions      Migraines      Multiple subsegmental pulmonary emboli without acute cor pulmonale (H) 02/01/2021     Oppositional defiant behavior      Presence of intrauterine contraceptive device 2/18/2020     Superficial venous thrombosis of arm, right 03/25/2021     Uncomplicated asthma      Past Surgical History:   Procedure Laterality Date     AS INSERT TUNNELED CV 2 CATH W/O PORT/PUMP       CHOLECYSTECTOMY       CV RIGHT HEART CATH MEASUREMENTS RECORDED N/A 11/18/2021    Procedure: Right Heart Cath;  Surgeon: Jackson Stuaffer MD;  Location:  HEART CARDIAC CATH LAB     INSERT PORT VASCULAR ACCESS Left 4/21/2021    Procedure: INSERTION, VASCULAR ACCESS PORT (NOT SURE ON SIDE UNTIL REMOVAL);  Surgeon: Rajan More MD;  Location: [facility] OR     IR CHEST PORT PLACEMENT > 5 YRS OF AGE  4/21/2021     IR CVC NON TUNNEL LINE REMOVAL  5/6/2021     IR CVC NON TUNNEL PLACEMENT > 5 YRS  04/07/2020     IR CVC NON TUNNEL PLACEMENT > 5 YRS  4/30/2021     IR CVC NON TUNNEL PLACEMENT > 5 YRS  9/7/2022     IR PORT REMOVAL LEFT  4/21/2021     REMOVE PORT VASCULAR ACCESS Left 4/21/2021    Procedure: REMOVAL, VASCULAR ACCESS PORT LEFT;  Surgeon: Rajan More MD;  Location: [facility] OR     REPAIR TENDON ELBOW Right 10/02/2019    Procedure: Right Elbow Flexor Lengthening, Flexor Pronator Slide Of Wrist and Finger, Thumb Adductor Lengthening;  Surgeon: Anai Franco MD;  Location: [facility] OR     TONSILLECTOMY Bilateral 10/02/2019    Procedure: Bilateral Tonsillectomy;  Surgeon: Farhana Guy MD;  Location: [facility] OR     ZC BREAST REDUCTION (INCLUDES LIPO) TIER 3 Bilateral 04/23/2019     Allergies   Allergen Reactions     Contrast Dye      Hives and breathing issues     Fish-Derived Products Hives     Seafood Hives     Gadolinium      Iodinated Contrast Media      Social History   Social History     Tobacco  Use     Smoking status: Never     Smokeless tobacco: Never   Substance Use Topics     Alcohol use: Not Currently     Alcohol/week: 0.0 standard drinks of alcohol     Drug use: Never    Still living at home with mom and extended family.     Past medical history and social history were reviewed.    Physical Examination:  /77   Pulse 110   Temp 98.9  F (37.2  C) (Oral)   Resp 16   SpO2 97%   Wt Readings from Last 10 Encounters:   05/03/23 74.8 kg (164 lb 14.5 oz)   04/30/23 74.8 kg (165 lb)   04/14/23 69.4 kg (153 lb 1.6 oz)   03/27/23 70.8 kg (156 lb)   03/27/23 69.8 kg (153 lb 14.4 oz)   03/16/23 68.1 kg (150 lb 3.2 oz)   03/10/23 67.6 kg (149 lb)   03/06/23 69.1 kg (152 lb 4.8 oz)   02/02/23 70.3 kg (155 lb)   01/30/23 68.9 kg (152 lb)     General: NAD  Eyes: EOMI, PERRL. Mild scleral icterus.  Respiratory: Faint expiratory wheezing throughout all lung fields.  Cardiac: Tachycardic, regular rhythm. No m/g/r.  MSK: Contractured right arm and hand 2/2 stroke.  Neurologic: Grossly nonfocal. A/O x 4.  Skin: No rashes, petechiae, or bruising noted on exposed skin.    Laboratory Data:  Most Recent 3 CBC's:  Recent Labs   Lab Test 05/08/23  1337 05/05/23  0854 05/03/23  1208 04/30/23  0406 04/14/23  1059   WBC 21.3* 14.9* 10.6   < > 12.9*   HGB 7.6* 6.6* 6.5*   < > 7.1*   MCV 86 87 87   < > 87   * 577* 490*   < > 449   ANEUTAUTO 17.1*  --  7.8  --  9.7*    < > = values in this interval not displayed.    Most Recent 3 BMP's:  Recent Labs   Lab Test 05/08/23  1337 05/05/23  0854 05/03/23  1208    139 139   POTASSIUM 4.1 3.4 3.6   CHLORIDE 104 108* 109*   CO2 21* 21* 21*   BUN 7.5 4.3* 5.4*   CR 0.53 0.47* 0.48*   ANIONGAP 11 10 9   MICAH 9.6 9.2 8.8   GLC 87 90 105*   PROTTOTAL 8.4* 7.4 7.3   ALBUMIN 4.7 4.3 4.2    Most Recent 2 LFT's:  Recent Labs   Lab Test 05/08/23  1337 05/05/23  0854   AST 26 31   ALT 16 20   ALKPHOS 82 67   BILITOTAL 3.1* 2.4*      Lab Results   Component Value Date    RETP 19.9  (H) 05/08/2023      Assessment and Plan:  1. Sickle Cell HgbSS Disease  2. Recent hospitalization with acute chest, pulmonary edema (1/2023)  3. Chronic Pain  4. 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 History of CVA  7. Hearing loss    Jennifer is seen today for routine follow-up. She has been frustrated by a multitude of symptoms she has been experiencing lately and particularly by the fact that when one symptoms resolves another one starts. Her WBC/ANC are elevated today. She is not hypoxic or having cough but is experiencing increased dyspnea on exertion. We will check a chest x-ray today following her infusion for IV fluids and pain medication. She has been adhering to twice weekly infusions although due to the limited availability in infusion has not been able to get in for infusion as consistently. We are continuing to slowly taper her oxycodone which she would like to continue although with her current symptoms and uncertainty about receiving routine infusion visits would like to wait on tapering further until we meet next month which is reasonable. She has also recently resumed sidney infusions and hopefully we will see some benefit to her pain control in the coming months as well.   Her hemoglobin level did decrease in the 6 range last week. Due to limited availability in infusion she was unable to receive a transfusion over the weekend and today her hemoglobin has improved to 7.6.     Desferal is currently on hold due to desire to stop/hold infusion due to decreased quality of life related to the amount of time she needed to be connected for infusion. She remains on Jadenu. She is still in need of a repeat Ferriscan which I will again request today.       Plan:  -IVF and pain meds in infusion today for sickle cell pain flare  -Obtain CXR due to leukocytosis, shortness of breath. Nebulizer solution refilled today as she ran out.  -Continue Hydrea to 3000mg  daily to help lessen frequency of sickle cell pain. Refilled today.  -Continue slow taper of oxycodone, currently at 25 tablets per refill with plan to decrease to 20 tablets per fill next month or sooner if she desires. She can continue the frequency at every 4 hours with a max of 6 tablets per day but with the decreased tablets per refill should aim for a max of 4 tablets per day. She can self-reduce infusion days/week and we will continue to keep the cap at 2/week for now.  -Continue infusion center visits limited to two times per week (Mondays and Fridays ideally). She requested that at least a few of these be pre-scheduled which I will discuss with Dr. Dnucan. Continue diligent home management with current medications, heat, rest, compression, warm baths.   -If unable to manage at home can go to ED but continue to not do IV narcotics in the emergency room. Ketamine previously added to pain plan in ED  - Desferal infusions on hold. Continue Jadenu and check Ferriscan. Will likely need to resume infusions in the future. She is fine having just one month off of infusions.   -Continue aspirin BID  -RTC with me in 1 month, Dr. Duncan in 3 months    50 minutes spent on the date of the encounter doing chart review, review of test results, interpretation of tests, patient visit and documentation     Patricia Mantilla, CNP

## 2023-05-08 NOTE — LETTER
5/8/2023         RE: Jennfier Cervantes  [address]  [facility] [address]        Dear Colleague,    Thank you for referring your patient, Jennifer Cervantes, to the [facility]. Please see a copy of my visit note below.    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 She had multiple thromboembolic events in 2021 despite adherent anticoagulation use (though warfarin was perpetually low) and there are concerns for chronic thromboembolic disease but did not have pulmonary HTN on a November 2021 cath. She is maintained on chronic PO opioids and twice-weekly infusion visits (since 1/24/22) but has been able to be maintained on this regimen and has stayed out of the ED most of the time with even rarer admissions.     Interval History:  Jennifer is seen for routine follow-up today. She has been struggling with increased shortness of breath and dyspnea on exertion for the past 5-6 days. Because of this she has been using her nebulizer more often which has been helpful although doesn't provide relief for too long. She denies any fever but has felt very cold at home and has been wearing many layers of clothes when her siblings and mom don't need to. She is having increased pain today and requesting IV fluids and pain medication in infusion. Denies any focal pain in her chest.    She resumed sidney infusions last week, receiving her last infusion on 5/3/23. Since we have been tapering her oxycodone, she now uses medication  "every 6 hours rather than every 4 and only uses as needed. She was last in the ED on 4/30/23 for sickle cell pain crisis and particularly to investigate local swelling to her right neck and anterior chest. Prior to going to the ED she was experiencing pain and swelling around her right clavicle for days that made it difficult to move her arm and her neck. The only thing that was relieving her discomfort was ibuprofen. A few days ago the swelling and pain spontaneously resolved.     Sickle Cell Disease Comprehensive Checklist    Bone Health/Avascular Necrosis Screening/Symptoms (each visit): no new concerns today    Leg Ulcer evaluation (every visit): none    Hypertension (every visit):stable 3/10/23    Last pulmonary evaluation (asthma, AMAN, pulm HTN): 9/28/22    Stroke/silent cerebral infarct Hx (Y/N): Yes TIA ~2014, first event ~age 1 with full stroke and R sided weakness    Last PCP Visit: 3/6/23    Vaccines:  ? PCV13: 5/13/19  ? Pneumovax (PPSV23): 3/04, 10/09, 7/12/19 (next due 7/2024)  ? Menactra: 4/2010, 9/2015 (MCV done 8/16/21)  ? Influenza: 11/17/2022     Audiology (chelation): done 6/2020, normal.. However, on 6/7, \"While there is no significant change in grade on the CTCAE 4.03 Scale, there were hearing changes bilaterally for both standard and extended high frequency audiometry.  Will need to determine if an ENT consult is advised due to the asymmetry in extended high frequency testing.\"     Plan last reviewed with patient: 7/11/22    Patient background: 23 yo F, enjoys movies and kids though there are times where she does not really want to talk to people. Does not have a lot of social support at home.     Sickle Cell Disease History  Primary Hematologist Team: Jose Rafael Duncan  PCP: none  Genotype: SS  Acute Pain Crisis Treatment: (avoiding IV opioids for now, which she has agreed to)    ER   o Oxycodone 15-20 mg x1  o Ketamine 4mg IV x 2--helps with opioid sparing  o Toradol 15 mg IV x1 "   o Maintenance IV fluids with LR  o Other: Zofran 8 mg IV PRN nausea    Inpatient:  o Home oxycodone/Oxycontin regimen, though home oxycodone dosing could be increased to 20 mg to start  o PCA plan:   - None for now  o Other Medications: Zofran  o She has had success with ketamine starting at 4mg/h and advancing only to 6mg/h, as 8mg/h made her feel quite poorly..  o ASA  o Supportive Care: Docusate, Senna  Chronic Pain Medications:    Home regimen Oxycontin 10 mg q12h, oxycodone 15 mg p.o. q.4-6 hours p.r.n. breakthrough pain.  She also continues on Voltaren gel, and Zoloft among other medications.    -Also benefits from mental health visits, acupuncture  Baseline Hemoglobin: 7 g/dl without chronic transfusions  Hydroxyurea use: Yes  H/O blood transfusions: Yes, several (iron overload) Most recent 11/20/2021    H/O Transfusion Reactions: no    Antibodies:none  H/O Acute Chest Syndrome: Yes    Last episode:9/05/22 (previously 4/26/21, 10/2019)     ICU/intubation: not with 9/2022 admission  H/O Stroke: Yes (managed with chronic transfusions in the past, stopped late Spring 2020)  H/O VTE: Yes (2/2021)  H/O Cholecystectomy or Splenectomy: no  H/O Asthma, Pulm HTN, AVN, Leg Ulcers, Nephropathy, Retinopathy, etc: Iron overload, asthma, chronic lung disease, physical limitations from early stroke    ---------------------------------------  Jennifer Cervantes's Goals (last discussed 3/16/23, did not discuss in detail today)    1-3 month goal:  Look for a job    6 month goal:      12 month goal:  Move into her own place     Disease-specific goal(s):  Continue to wean oxycodone down every 3-4 weeks  ---------------------------------------      Current Outpatient Medications   Medication Sig Dispense Refill     acetaminophen (TYLENOL) 325 MG tablet Take 2 tablets (650 mg) by mouth every 6 hours as needed for mild pain 120 tablet 3     aspirin (ASA) 81 MG chewable tablet Take 1 tablet (81 mg) by mouth 2 times daily 60 tablet 11      deferasirox (JADENU) 360 MG tablet Take 4 tablets (1,440 mg) by mouth every evening 120 tablet 4     diclofenac (VOLTAREN) 1 % topical gel Apply 4 g topically 4 times daily 350 g 0     diphenhydrAMINE (BENADRYL) 25 MG capsule Take 1 capsule (25 mg) by mouth every 6 hours as needed for itching or allergies 30 capsule 0     FLUoxetine (PROZAC) 10 MG capsule Take 1 capsule (10 mg) by mouth daily For two weeks, then increase to 20 mg if 10 mg not effective 40 capsule 1     folic acid (FOLVITE) 1 MG tablet Take 1 tablet (1 mg) by mouth daily 30 tablet 0     Hydroxyurea 1000 MG TABS Take 3,000 mg by mouth daily 90 tablet 3     ondansetron (ZOFRAN) 8 MG tablet Take 1 tablet (8 mg) by mouth every 8 hours as needed 30 tablet 1     traZODone (DESYREL) 50 MG tablet Take 1 tablet (50 mg) by mouth At Bedtime 30 tablet 1     albuterol (PROAIR HFA/PROVENTIL HFA/VENTOLIN HFA) 108 (90 Base) MCG/ACT inhaler Inhale 2 puffs into the lungs every 6 hours as needed for shortness of breath or wheezing 8.5 g 3     albuterol (PROVENTIL) (2.5 MG/3ML) 0.083% neb solution Take 2 vials (5 mg) by nebulization every 6 hours as needed for shortness of breath or wheezing 90 mL 3     budesonide-formoterol (SYMBICORT) 160-4.5 MCG/ACT Inhaler Inhale 2 puffs into the lungs 2 times daily 10.2 g 3     EPINEPHrine (ANY BX GENERIC EQUIV) 0.3 MG/0.3ML injection 2-pack Inject 0.3 mLs (0.3 mg) into the muscle as needed for anaphylaxis (Patient not taking: Reported on 3/10/2023) 1 each 1     naloxone (NARCAN) 4 MG/0.1ML nasal spray Spray 4 mg into one nostril alternating nostrils as needed for opioid reversal every 2-3 minutes until assistance arrives (Patient not taking: Reported on 4/17/2023)       oxyCODONE IR (ROXICODONE) 15 MG tablet Take 1 tablet (15 mg) by mouth every 4 hours as needed for severe pain Goal 4 per day. Max 6 per day. 25 tablet 0       Past Medical History  Past Medical History:   Diagnosis Date     Anxiety      Bleeding disorder (H)       Blood clotting disorder (H)      Cerebral infarction (H) 2015     Cognitive developmental delay     low IQ. Please recognize when managing pain and planning with her     Depressive disorder      Hemiplegia and hemiparesis following cerebral infarction affecting right dominant side (H)     right hand contractures     Iron overload due to repeated red blood cell transfusions      Migraines      Multiple subsegmental pulmonary emboli without acute cor pulmonale (H) 02/01/2021     Oppositional defiant behavior      Presence of intrauterine contraceptive device 2/18/2020     Superficial venous thrombosis of arm, right 03/25/2021     Uncomplicated asthma      Past Surgical History:   Procedure Laterality Date     AS INSERT TUNNELED CV 2 CATH W/O PORT/PUMP       CHOLECYSTECTOMY       CV RIGHT HEART CATH MEASUREMENTS RECORDED N/A 11/18/2021    Procedure: Right Heart Cath;  Surgeon: Jackson Stauffer MD;  Location:  HEART CARDIAC CATH LAB     INSERT PORT VASCULAR ACCESS Left 4/21/2021    Procedure: INSERTION, VASCULAR ACCESS PORT (NOT SURE ON SIDE UNTIL REMOVAL);  Surgeon: Rajan More MD;  Location: [facility] OR     IR CHEST PORT PLACEMENT > 5 YRS OF AGE  4/21/2021     IR CVC NON TUNNEL LINE REMOVAL  5/6/2021     IR CVC NON TUNNEL PLACEMENT > 5 YRS  04/07/2020     IR CVC NON TUNNEL PLACEMENT > 5 YRS  4/30/2021     IR CVC NON TUNNEL PLACEMENT > 5 YRS  9/7/2022     IR PORT REMOVAL LEFT  4/21/2021     REMOVE PORT VASCULAR ACCESS Left 4/21/2021    Procedure: REMOVAL, VASCULAR ACCESS PORT LEFT;  Surgeon: Rajan More MD;  Location: [facility] OR     REPAIR TENDON ELBOW Right 10/02/2019    Procedure: Right Elbow Flexor Lengthening, Flexor Pronator Slide Of Wrist and Finger, Thumb Adductor Lengthening;  Surgeon: Anai Franco MD;  Location: [facility] OR     TONSILLECTOMY Bilateral 10/02/2019    Procedure: Bilateral Tonsillectomy;  Surgeon: Farhana Guy MD;  Location: [facility] OR     ZC BREAST REDUCTION (INCLUDES LIPO)  TIER 3 Bilateral 04/23/2019     Allergies   Allergen Reactions     Contrast Dye      Hives and breathing issues     Fish-Derived Products Hives     Seafood Hives     Gadolinium      Iodinated Contrast Media      Social History   Social History     Tobacco Use     Smoking status: Never     Smokeless tobacco: Never   Substance Use Topics     Alcohol use: Not Currently     Alcohol/week: 0.0 standard drinks of alcohol     Drug use: Never    Still living at home with mom and extended family.     Past medical history and social history were reviewed.    Physical Examination:  /77   Pulse 110   Temp 98.9  F (37.2  C) (Oral)   Resp 16   SpO2 97%   Wt Readings from Last 10 Encounters:   05/03/23 74.8 kg (164 lb 14.5 oz)   04/30/23 74.8 kg (165 lb)   04/14/23 69.4 kg (153 lb 1.6 oz)   03/27/23 70.8 kg (156 lb)   03/27/23 69.8 kg (153 lb 14.4 oz)   03/16/23 68.1 kg (150 lb 3.2 oz)   03/10/23 67.6 kg (149 lb)   03/06/23 69.1 kg (152 lb 4.8 oz)   02/02/23 70.3 kg (155 lb)   01/30/23 68.9 kg (152 lb)     General: NAD  Eyes: EOMI, PERRL. Mild scleral icterus.  Respiratory: Faint expiratory wheezing throughout all lung fields.  Cardiac: Tachycardic, regular rhythm. No m/g/r.  MSK: Contractured right arm and hand 2/2 stroke.  Neurologic: Grossly nonfocal. A/O x 4.  Skin: No rashes, petechiae, or bruising noted on exposed skin.    Laboratory Data:  Most Recent 3 CBC's:  Recent Labs   Lab Test 05/08/23  1337 05/05/23  0854 05/03/23  1208 04/30/23  0406 04/14/23  1059   WBC 21.3* 14.9* 10.6   < > 12.9*   HGB 7.6* 6.6* 6.5*   < > 7.1*   MCV 86 87 87   < > 87   * 577* 490*   < > 449   ANEUTAUTO 17.1*  --  7.8  --  9.7*    < > = values in this interval not displayed.    Most Recent 3 BMP's:  Recent Labs   Lab Test 05/08/23  1337 05/05/23  0854 05/03/23  1208    139 139   POTASSIUM 4.1 3.4 3.6   CHLORIDE 104 108* 109*   CO2 21* 21* 21*   BUN 7.5 4.3* 5.4*   CR 0.53 0.47* 0.48*   ANIONGAP 11 10 9   MICAH 9.6 9.2 8.8    GLC 87 90 105*   PROTTOTAL 8.4* 7.4 7.3   ALBUMIN 4.7 4.3 4.2    Most Recent 2 LFT's:  Recent Labs   Lab Test 05/08/23  1337 05/05/23  0854   AST 26 31   ALT 16 20   ALKPHOS 82 67   BILITOTAL 3.1* 2.4*      Lab Results   Component Value Date    RETP 19.9 (H) 05/08/2023      Assessment and Plan:  1. Sickle Cell HgbSS Disease  2. Recent hospitalization with acute chest, pulmonary edema (1/2023)  3. Chronic Pain  4. 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 History of CVA  7. Hearing loss    Jennifer is seen today for routine follow-up. She has been frustrated by a multitude of symptoms she has been experiencing lately and particularly by the fact that when one symptoms resolves another one starts. Her WBC/ANC are elevated today. She is not hypoxic or having cough but is experiencing increased dyspnea on exertion. We will check a chest x-ray today following her infusion for IV fluids and pain medication. She has been adhering to twice weekly infusions although due to the limited availability in infusion has not been able to get in for infusion as consistently. We are continuing to slowly taper her oxycodone which she would like to continue although with her current symptoms and uncertainty about receiving routine infusion visits would like to wait on tapering further until we meet next month which is reasonable. She has also recently resumed sidney infusions and hopefully we will see some benefit to her pain control in the coming months as well.   Her hemoglobin level did decrease in the 6 range last week. Due to limited availability in infusion she was unable to receive a transfusion over the weekend and today her hemoglobin has improved to 7.6.     Desferal is currently on hold due to desire to stop/hold infusion due to decreased quality of life related to the amount of time she needed to be connected for infusion. She remains on Jadenu. She is still in need of a  repeat Ferriscan which I will again request today.       Plan:  -Continue Hydrea to 3000mg daily to help lessen frequency of sickle cell pain. Refilled today.  -Continue slow taper of oxycodone, currently at 25 tablets per refill with plan to decrease to 20 tablets per fill next month or sooner if she desires. She can continue the frequency at every 4 hours with a max of 6 tablets per day but with the decreased tablets per refill should aim for a max of 4 tablets per day. She can self-reduce infusion days/week and we will continue to keep the cap at 2/week for now.  -Continue infusion center visits limited to two times per week (Mondays and Fridays ideally). She requested that at least a few of these be pre-scheduled which I will discuss with Dr. Duncan. Continue diligent home management with current medications, heat, rest, compression, warm baths.   -If unable to manage at home can go to ED but continue to not do IV narcotics in the emergency room. Ketamine previously added to pain plan in ED  - Desferal infusions on hold. Continue Jadenu and check Ferriscan. Will likely need to resume infusions in the future. She is fine having just one month off of infusions.   -Continue aspirin BID  -RTC with me 5/8    *** minutes spent on the date of the encounter doing {2021 E&M time in:685910}     Patricia Mantilla CNP          Again, thank you for allowing me to participate in the care of your patient.        Sincerely,        Patricia Mantilla CNP

## 2023-05-08 NOTE — TELEPHONE ENCOUNTER
Call from pt to see where she is at on infusion wait list. Writer informed she is second on the list, informed infusion has not been able to take anyone yet today, informed someone from triage will call when an opening is available. Pt voiced understanding.

## 2023-05-08 NOTE — PROGRESS NOTES
Infusion Nursing Note:  Jennifer Cervantes presents today for IVF and Dilaudid.    Patient seen by provider today: Yes: Patricia SANTIAGO NP   present during visit today: Not Applicable.    Note:     Crizanlizumab-tmca last received 5/3; next scheduled 5/17    Intravenous Access:  Implanted Port.    Treatment Conditions:  Lab Results   Component Value Date    HGB 7.6 (L) 05/08/2023    WBC 21.3 (H) 05/08/2023    ANEU 10.7 (H) 05/05/2023    ANEUTAUTO 7.8 05/03/2023     (H) 05/08/2023      Lab Results   Component Value Date     05/08/2023    POTASSIUM 4.1 05/08/2023    MAG 2.0 11/11/2021    CR 0.53 05/08/2023    MICAH 9.6 05/08/2023    BILITOTAL 3.1 (H) 05/08/2023    ALBUMIN 4.7 05/08/2023    ALT 16 05/08/2023    AST 26 05/08/2023     Results reviewed, labs MET treatment parameters, ok to proceed with treatment.      Post Infusion Assessment:  Patient tolerated infusion without incident.  Blood return noted pre and post infusion.  No evidence of extravasations.  Access discontinued per protocol.       Discharge Plan:   Discharge instructions reviewed with: Patient.  Copy of AVS reviewed with patient and/or family.  Patient will return 5/17 for next appointment.  Patient discharged in stable condition accompanied by: self.  Departure Mode: Ambulatory.      Kelsey Knapp RN

## 2023-05-08 NOTE — TELEPHONE ENCOUNTER
Narcotic Refill Request    Medication(s) requested:  Oxycodone  Person Requesting Refill: Patient  What pain is the medication treating: Sickle Cell Pain  How is the medication being taken?: One tablet every 6 hrs PRN  Does pt have enough for today? Will be out today  Is pain being adequately controlled on the current regimen?: Yes  Experiencing any side effects from medication?: No      Last fill date and by whom:  05/01/23 by Patricia Mantilla   Reviewed: No access        Medication: Albuterol neb solution, Symbicort and Albuterol inhaler    Last prescribing provider:  07/11/22 and Patricia Mantilla 05/17/22      Date of most recent appointment:  04/14/23 w/Patricia Mantilla  Any No Show Visits: No  Next appointment:   05/08/23 w/Patricia Mantilla      Routed/Paged provider: Patricia Mantilla

## 2023-05-09 ENCOUNTER — PATIENT OUTREACH (OUTPATIENT)
Dept: ONCOLOGY | Facility: CLINIC | Age: 23
End: 2023-05-09
Payer: COMMERCIAL

## 2023-05-09 ENCOUNTER — MYC MEDICAL ADVICE (OUTPATIENT)
Dept: INTERNAL MEDICINE | Facility: CLINIC | Age: 23
End: 2023-05-09
Payer: COMMERCIAL

## 2023-05-09 NOTE — PROGRESS NOTES
[facility]: Cancer Care                                                                                          Spoke with pt.  Let her know that her chest xray was WNL per Patricia JIMENEZ.      She stated she is feeling better today.  She received IVF and pain meds yesterday in infusion. She did not mention feeling SOB today.  She had asked Patricia if she could get scheduled on a regular basis in infusion, and writer had to tell her that that was not allowed at this time.  She would have to continue to call into triage on the mornings she felt like she needed IVF and pain meds.  Pt stated understanding and knows how to get a hold of the clinic if her symptoms get worse.      Signature:  Arely Krueger RN

## 2023-05-10 ENCOUNTER — PATIENT OUTREACH (OUTPATIENT)
Dept: CARE COORDINATION | Facility: CLINIC | Age: 23
End: 2023-05-10
Payer: COMMERCIAL

## 2023-05-10 ENCOUNTER — TELEPHONE (OUTPATIENT)
Dept: ONCOLOGY | Facility: CLINIC | Age: 23
End: 2023-05-10

## 2023-05-10 ENCOUNTER — HOSPITAL ENCOUNTER (INPATIENT)
Facility: CLINIC | Age: 23
LOS: 6 days | Discharge: HOME | DRG: 811 | End: 2023-05-16
Attending: INTERNAL MEDICINE | Admitting: INTERNAL MEDICINE
Payer: COMMERCIAL

## 2023-05-10 ENCOUNTER — APPOINTMENT (OUTPATIENT)
Dept: ULTRASOUND IMAGING | Facility: CLINIC | Age: 23
DRG: 811 | End: 2023-05-10
Payer: COMMERCIAL

## 2023-05-10 ENCOUNTER — NURSE TRIAGE (OUTPATIENT)
Dept: ONCOLOGY | Facility: CLINIC | Age: 23
End: 2023-05-10
Payer: COMMERCIAL

## 2023-05-10 ENCOUNTER — APPOINTMENT (OUTPATIENT)
Dept: GENERAL RADIOLOGY | Facility: CLINIC | Age: 23
DRG: 811 | End: 2023-05-10
Attending: INTERNAL MEDICINE
Payer: COMMERCIAL

## 2023-05-10 ENCOUNTER — INFUSION THERAPY VISIT (OUTPATIENT)
Dept: ONCOLOGY | Facility: CLINIC | Age: 23
DRG: 811 | End: 2023-05-10
Attending: REGISTERED NURSE
Payer: COMMERCIAL

## 2023-05-10 VITALS
OXYGEN SATURATION: 93 % | DIASTOLIC BLOOD PRESSURE: 68 MMHG | SYSTOLIC BLOOD PRESSURE: 112 MMHG | TEMPERATURE: 98.8 F | HEART RATE: 99 BPM | RESPIRATION RATE: 16 BRPM

## 2023-05-10 DIAGNOSIS — G81.10 SPASTIC HEMIPLEGIA, UNSPECIFIED ETIOLOGY, UNSPECIFIED LATERALITY (H): Primary | ICD-10-CM

## 2023-05-10 DIAGNOSIS — D57.1 HB-SS DISEASE WITHOUT CRISIS (H): ICD-10-CM

## 2023-05-10 DIAGNOSIS — R06.02 SHORTNESS OF BREATH: ICD-10-CM

## 2023-05-10 DIAGNOSIS — D57.00 SICKLE CELL PAIN CRISIS (H): ICD-10-CM

## 2023-05-10 DIAGNOSIS — R09.02 HYPOXIA: ICD-10-CM

## 2023-05-10 DIAGNOSIS — R07.9 CHEST PAIN, UNSPECIFIED TYPE: ICD-10-CM

## 2023-05-10 DIAGNOSIS — J45.901 MODERATE ASTHMA WITH EXACERBATION, UNSPECIFIED WHETHER PERSISTENT: ICD-10-CM

## 2023-05-10 DIAGNOSIS — Z20.822 LAB TEST NEGATIVE FOR COVID-19 VIRUS: ICD-10-CM

## 2023-05-10 DIAGNOSIS — R06.02 SOB (SHORTNESS OF BREATH): ICD-10-CM

## 2023-05-10 LAB
ALBUMIN SERPL BCG-MCNC: 4.4 G/DL (ref 3.5–5.2)
ALP SERPL-CCNC: 76 U/L (ref 35–104)
ALT SERPL W P-5'-P-CCNC: 14 U/L (ref 10–35)
ANION GAP SERPL CALCULATED.3IONS-SCNC: 12 MMOL/L (ref 7–15)
AST SERPL W P-5'-P-CCNC: 33 U/L (ref 10–35)
BASOPHILS # BLD AUTO: 0.3 10E3/UL (ref 0–0.2)
BASOPHILS NFR BLD AUTO: 2 %
BILIRUB SERPL-MCNC: 3.2 MG/DL
BUN SERPL-MCNC: 5.5 MG/DL (ref 6–20)
CALCIUM SERPL-MCNC: 8.9 MG/DL (ref 8.6–10)
CHLORIDE SERPL-SCNC: 104 MMOL/L (ref 98–107)
CREAT SERPL-MCNC: 0.58 MG/DL (ref 0.51–0.95)
CRP SERPL-MCNC: 17.1 MG/L
CRP SERPL-MCNC: 18.4 MG/L
D DIMER PPP FEU-MCNC: 2.22 UG/ML FEU (ref 0–0.5)
DEPRECATED HCO3 PLAS-SCNC: 19 MMOL/L (ref 22–29)
EOSINOPHIL # BLD AUTO: 1.6 10E3/UL (ref 0–0.7)
EOSINOPHIL NFR BLD AUTO: 9 %
ERYTHROCYTE [DISTWIDTH] IN BLOOD BY AUTOMATED COUNT: 27.2 % (ref 10–15)
FLUAV RNA SPEC QL NAA+PROBE: NEGATIVE
FLUBV RNA RESP QL NAA+PROBE: NEGATIVE
GFR SERPL CREATININE-BSD FRML MDRD: >90 ML/MIN/1.73M2
GLUCOSE SERPL-MCNC: 78 MG/DL (ref 70–99)
HCT VFR BLD AUTO: 19.7 % (ref 35–47)
HGB BLD-MCNC: 6.4 G/DL (ref 11.7–15.7)
IMM GRANULOCYTES # BLD: 0.2 10E3/UL
IMM GRANULOCYTES NFR BLD: 1 %
INR PPP: 1.32 (ref 0.85–1.15)
LACTATE SERPL-SCNC: 0.8 MMOL/L (ref 0.7–2)
LYMPHOCYTES # BLD AUTO: 2.6 10E3/UL (ref 0.8–5.3)
LYMPHOCYTES NFR BLD AUTO: 14 %
MCH RBC QN AUTO: 28.3 PG (ref 26.5–33)
MCHC RBC AUTO-ENTMCNC: 32.5 G/DL (ref 31.5–36.5)
MCV RBC AUTO: 87 FL (ref 78–100)
MONOCYTES # BLD AUTO: 1.4 10E3/UL (ref 0–1.3)
MONOCYTES NFR BLD AUTO: 8 %
NEUTROPHILS # BLD AUTO: 12.2 10E3/UL (ref 1.6–8.3)
NEUTROPHILS NFR BLD AUTO: 66 %
NRBC # BLD AUTO: 0.8 10E3/UL
NRBC BLD AUTO-RTO: 5 /100
NT-PROBNP SERPL-MCNC: 53 PG/ML (ref 0–450)
PLATELET # BLD AUTO: 546 10E3/UL (ref 150–450)
POTASSIUM SERPL-SCNC: 3.7 MMOL/L (ref 3.4–5.3)
PROCALCITONIN SERPL IA-MCNC: 0.31 NG/ML
PROT SERPL-MCNC: 7.4 G/DL (ref 6.4–8.3)
RBC # BLD AUTO: 2.26 10E6/UL (ref 3.8–5.2)
RETICS # AUTO: 0.55 10E6/UL (ref 0.03–0.1)
RETICS/RBC NFR AUTO: 24.2 % (ref 0.5–2)
RSV RNA SPEC NAA+PROBE: NEGATIVE
SARS-COV-2 RNA RESP QL NAA+PROBE: NEGATIVE
SODIUM SERPL-SCNC: 135 MMOL/L (ref 136–145)
TROPONIN T SERPL HS-MCNC: <6 NG/L
WBC # BLD AUTO: 18.2 10E3/UL (ref 4–11)

## 2023-05-10 PROCEDURE — 93970 EXTREMITY STUDY: CPT

## 2023-05-10 PROCEDURE — 86140 C-REACTIVE PROTEIN: CPT | Performed by: INTERNAL MEDICINE

## 2023-05-10 PROCEDURE — 250N000011 HC RX IP 250 OP 636: Performed by: INTERNAL MEDICINE

## 2023-05-10 PROCEDURE — 85025 COMPLETE CBC W/AUTO DIFF WBC: CPT | Performed by: INTERNAL MEDICINE

## 2023-05-10 PROCEDURE — 87637 SARSCOV2&INF A&B&RSV AMP PRB: CPT | Performed by: INTERNAL MEDICINE

## 2023-05-10 PROCEDURE — 85045 AUTOMATED RETICULOCYTE COUNT: CPT | Performed by: INTERNAL MEDICINE

## 2023-05-10 PROCEDURE — 250N000011 HC RX IP 250 OP 636: Performed by: PEDIATRICS

## 2023-05-10 PROCEDURE — 96361 HYDRATE IV INFUSION ADD-ON: CPT

## 2023-05-10 PROCEDURE — 250N000009 HC RX 250

## 2023-05-10 PROCEDURE — 83605 ASSAY OF LACTIC ACID: CPT | Performed by: INTERNAL MEDICINE

## 2023-05-10 PROCEDURE — 96374 THER/PROPH/DIAG INJ IV PUSH: CPT

## 2023-05-10 PROCEDURE — 36415 COLL VENOUS BLD VENIPUNCTURE: CPT | Performed by: INTERNAL MEDICINE

## 2023-05-10 PROCEDURE — 85379 FIBRIN DEGRADATION QUANT: CPT | Performed by: INTERNAL MEDICINE

## 2023-05-10 PROCEDURE — 99285 EMERGENCY DEPT VISIT HI MDM: CPT | Mod: 25 | Performed by: INTERNAL MEDICINE

## 2023-05-10 PROCEDURE — 258N000003 HC RX IP 258 OP 636

## 2023-05-10 PROCEDURE — 258N000003 HC RX IP 258 OP 636: Performed by: PEDIATRICS

## 2023-05-10 PROCEDURE — 94640 AIRWAY INHALATION TREATMENT: CPT | Performed by: INTERNAL MEDICINE

## 2023-05-10 PROCEDURE — 96374 THER/PROPH/DIAG INJ IV PUSH: CPT | Performed by: INTERNAL MEDICINE

## 2023-05-10 PROCEDURE — 250N000013 HC RX MED GY IP 250 OP 250 PS 637: Performed by: INTERNAL MEDICINE

## 2023-05-10 PROCEDURE — 93005 ELECTROCARDIOGRAM TRACING: CPT | Performed by: INTERNAL MEDICINE

## 2023-05-10 PROCEDURE — 80053 COMPREHEN METABOLIC PANEL: CPT | Performed by: INTERNAL MEDICINE

## 2023-05-10 PROCEDURE — 85610 PROTHROMBIN TIME: CPT | Performed by: INTERNAL MEDICINE

## 2023-05-10 PROCEDURE — 93970 EXTREMITY STUDY: CPT | Mod: 26 | Performed by: RADIOLOGY

## 2023-05-10 PROCEDURE — 96376 TX/PRO/DX INJ SAME DRUG ADON: CPT

## 2023-05-10 PROCEDURE — 71045 X-RAY EXAM CHEST 1 VIEW: CPT | Mod: 26 | Performed by: RADIOLOGY

## 2023-05-10 PROCEDURE — 83880 ASSAY OF NATRIURETIC PEPTIDE: CPT | Performed by: INTERNAL MEDICINE

## 2023-05-10 PROCEDURE — 71045 X-RAY EXAM CHEST 1 VIEW: CPT

## 2023-05-10 PROCEDURE — 84145 PROCALCITONIN (PCT): CPT | Performed by: INTERNAL MEDICINE

## 2023-05-10 PROCEDURE — 99223 1ST HOSP IP/OBS HIGH 75: CPT | Mod: GC | Performed by: INTERNAL MEDICINE

## 2023-05-10 PROCEDURE — 120N000002 HC R&B MED SURG/OB UMMC

## 2023-05-10 PROCEDURE — 93010 ELECTROCARDIOGRAM REPORT: CPT | Performed by: INTERNAL MEDICINE

## 2023-05-10 PROCEDURE — 84484 ASSAY OF TROPONIN QUANT: CPT | Performed by: INTERNAL MEDICINE

## 2023-05-10 PROCEDURE — 250N000009 HC RX 250: Performed by: INTERNAL MEDICINE

## 2023-05-10 RX ORDER — ENOXAPARIN SODIUM 100 MG/ML
1 INJECTION SUBCUTANEOUS EVERY 12 HOURS
Status: DISCONTINUED | OUTPATIENT
Start: 2023-05-11 | End: 2023-05-11

## 2023-05-10 RX ORDER — LIDOCAINE 40 MG/G
CREAM TOPICAL
Status: DISCONTINUED | OUTPATIENT
Start: 2023-05-10 | End: 2023-05-16 | Stop reason: HOSPADM

## 2023-05-10 RX ORDER — HEPARIN SODIUM 10000 [USP'U]/100ML
0-5000 INJECTION, SOLUTION INTRAVENOUS CONTINUOUS
Status: DISCONTINUED | OUTPATIENT
Start: 2023-05-10 | End: 2023-05-10

## 2023-05-10 RX ORDER — OXYCODONE HYDROCHLORIDE 10 MG/1
20 TABLET ORAL EVERY 4 HOURS PRN
Status: DISCONTINUED | OUTPATIENT
Start: 2023-05-10 | End: 2023-05-11

## 2023-05-10 RX ORDER — DEFERASIROX 360 MG/1
1440 TABLET, FILM COATED ORAL EVERY EVENING
Status: DISCONTINUED | OUTPATIENT
Start: 2023-05-11 | End: 2023-05-11

## 2023-05-10 RX ORDER — AMOXICILLIN 250 MG
2 CAPSULE ORAL 2 TIMES DAILY
Status: DISCONTINUED | OUTPATIENT
Start: 2023-05-10 | End: 2023-05-16 | Stop reason: HOSPADM

## 2023-05-10 RX ORDER — HEPARIN SODIUM (PORCINE) LOCK FLUSH IV SOLN 100 UNIT/ML 100 UNIT/ML
5 SOLUTION INTRAVENOUS
Status: CANCELLED | OUTPATIENT
Start: 2023-07-01

## 2023-05-10 RX ORDER — AMOXICILLIN 250 MG
1 CAPSULE ORAL 2 TIMES DAILY PRN
Status: DISCONTINUED | OUTPATIENT
Start: 2023-05-10 | End: 2023-05-10

## 2023-05-10 RX ORDER — ONDANSETRON 4 MG/1
8 TABLET, FILM COATED ORAL
Status: CANCELLED
Start: 2023-07-01

## 2023-05-10 RX ORDER — SODIUM CHLORIDE 9 MG/ML
INJECTION, SOLUTION INTRAVENOUS CONTINUOUS
Status: DISCONTINUED | OUTPATIENT
Start: 2023-05-10 | End: 2023-05-13

## 2023-05-10 RX ORDER — IPRATROPIUM BROMIDE AND ALBUTEROL SULFATE 2.5; .5 MG/3ML; MG/3ML
3 SOLUTION RESPIRATORY (INHALATION) ONCE
Status: COMPLETED | OUTPATIENT
Start: 2023-05-10 | End: 2023-05-10

## 2023-05-10 RX ORDER — ALBUTEROL SULFATE 90 UG/1
2 AEROSOL, METERED RESPIRATORY (INHALATION) EVERY 6 HOURS PRN
Status: DISCONTINUED | OUTPATIENT
Start: 2023-05-10 | End: 2023-05-10

## 2023-05-10 RX ORDER — HYDROXYUREA 500 MG/1
3000 CAPSULE ORAL DAILY
Status: DISCONTINUED | OUTPATIENT
Start: 2023-05-11 | End: 2023-05-16 | Stop reason: HOSPADM

## 2023-05-10 RX ORDER — ONDANSETRON 4 MG/1
4 TABLET, ORALLY DISINTEGRATING ORAL EVERY 6 HOURS PRN
Status: DISCONTINUED | OUTPATIENT
Start: 2023-05-10 | End: 2023-05-16 | Stop reason: HOSPADM

## 2023-05-10 RX ORDER — ACETAMINOPHEN 325 MG/1
650 TABLET ORAL EVERY 6 HOURS PRN
Status: DISCONTINUED | OUTPATIENT
Start: 2023-05-10 | End: 2023-05-11

## 2023-05-10 RX ORDER — ALBUTEROL SULFATE 0.83 MG/ML
5 SOLUTION RESPIRATORY (INHALATION) EVERY 6 HOURS PRN
Status: DISCONTINUED | OUTPATIENT
Start: 2023-05-10 | End: 2023-05-11

## 2023-05-10 RX ORDER — HEPARIN SODIUM (PORCINE) LOCK FLUSH IV SOLN 100 UNIT/ML 100 UNIT/ML
5 SOLUTION INTRAVENOUS
Status: DISCONTINUED | OUTPATIENT
Start: 2023-05-10 | End: 2023-05-10 | Stop reason: HOSPADM

## 2023-05-10 RX ORDER — METHYLPREDNISOLONE SODIUM SUCCINATE 125 MG/2ML
40 INJECTION, POWDER, LYOPHILIZED, FOR SOLUTION INTRAMUSCULAR; INTRAVENOUS ONCE
Status: COMPLETED | OUTPATIENT
Start: 2023-05-10 | End: 2023-05-10

## 2023-05-10 RX ORDER — PANTOPRAZOLE SODIUM 40 MG/1
40 TABLET, DELAYED RELEASE ORAL
Status: DISCONTINUED | OUTPATIENT
Start: 2023-05-10 | End: 2023-05-16 | Stop reason: HOSPADM

## 2023-05-10 RX ORDER — DIPHENHYDRAMINE HCL 25 MG
25 CAPSULE ORAL
Status: CANCELLED
Start: 2023-07-01

## 2023-05-10 RX ORDER — POLYETHYLENE GLYCOL 3350 17 G/17G
17 POWDER, FOR SOLUTION ORAL DAILY PRN
Status: DISCONTINUED | OUTPATIENT
Start: 2023-05-10 | End: 2023-05-11

## 2023-05-10 RX ORDER — SODIUM CHLORIDE, SODIUM LACTATE, POTASSIUM CHLORIDE, CALCIUM CHLORIDE 600; 310; 30; 20 MG/100ML; MG/100ML; MG/100ML; MG/100ML
INJECTION, SOLUTION INTRAVENOUS CONTINUOUS
Status: DISCONTINUED | OUTPATIENT
Start: 2023-05-10 | End: 2023-05-10

## 2023-05-10 RX ORDER — ONDANSETRON 2 MG/ML
4 INJECTION INTRAMUSCULAR; INTRAVENOUS EVERY 6 HOURS PRN
Status: DISCONTINUED | OUTPATIENT
Start: 2023-05-10 | End: 2023-05-16 | Stop reason: HOSPADM

## 2023-05-10 RX ORDER — ONDANSETRON 8 MG/1
8 TABLET, FILM COATED ORAL EVERY 8 HOURS PRN
Status: DISCONTINUED | OUTPATIENT
Start: 2023-05-10 | End: 2023-05-16 | Stop reason: HOSPADM

## 2023-05-10 RX ORDER — AMOXICILLIN 250 MG
2 CAPSULE ORAL 2 TIMES DAILY PRN
Status: DISCONTINUED | OUTPATIENT
Start: 2023-05-10 | End: 2023-05-10

## 2023-05-10 RX ORDER — FLUOXETINE 10 MG/1
10 CAPSULE ORAL DAILY
Status: DISCONTINUED | OUTPATIENT
Start: 2023-05-11 | End: 2023-05-16 | Stop reason: HOSPADM

## 2023-05-10 RX ORDER — HEPARIN SODIUM,PORCINE 10 UNIT/ML
5 VIAL (ML) INTRAVENOUS
Status: CANCELLED | OUTPATIENT
Start: 2023-07-01

## 2023-05-10 RX ORDER — FLUTICASONE FUROATE AND VILANTEROL 200; 25 UG/1; UG/1
1 POWDER RESPIRATORY (INHALATION) DAILY
Status: DISCONTINUED | OUTPATIENT
Start: 2023-05-11 | End: 2023-05-16 | Stop reason: HOSPADM

## 2023-05-10 RX ORDER — ASPIRIN 81 MG/1
81 TABLET, CHEWABLE ORAL 2 TIMES DAILY
Status: DISCONTINUED | OUTPATIENT
Start: 2023-05-10 | End: 2023-05-16 | Stop reason: HOSPADM

## 2023-05-10 RX ADMIN — IPRATROPIUM BROMIDE AND ALBUTEROL SULFATE 3 ML: .5; 3 SOLUTION RESPIRATORY (INHALATION) at 20:00

## 2023-05-10 RX ADMIN — METHYLPREDNISOLONE SODIUM SUCCINATE 37.5 MG: 125 INJECTION, POWDER, FOR SOLUTION INTRAMUSCULAR; INTRAVENOUS at 20:56

## 2023-05-10 RX ADMIN — HYDROMORPHONE HYDROCHLORIDE 1 MG: 1 INJECTION, SOLUTION INTRAMUSCULAR; INTRAVENOUS; SUBCUTANEOUS at 12:01

## 2023-05-10 RX ADMIN — SODIUM CHLORIDE, POTASSIUM CHLORIDE, SODIUM LACTATE AND CALCIUM CHLORIDE 1000 ML: 600; 310; 30; 20 INJECTION, SOLUTION INTRAVENOUS at 12:00

## 2023-05-10 RX ADMIN — Medication 5 ML: at 14:06

## 2023-05-10 RX ADMIN — KETAMINE HYDROCHLORIDE 4 MG/HR: 10 INJECTION, SOLUTION INTRAMUSCULAR; INTRAVENOUS at 23:53

## 2023-05-10 RX ADMIN — Medication 4 MG: at 20:24

## 2023-05-10 RX ADMIN — HYDROMORPHONE HYDROCHLORIDE 1 MG: 1 INJECTION, SOLUTION INTRAMUSCULAR; INTRAVENOUS; SUBCUTANEOUS at 13:11

## 2023-05-10 RX ADMIN — OXYCODONE HYDROCHLORIDE 15 MG: 10 TABLET ORAL at 21:53

## 2023-05-10 RX ADMIN — HYDROMORPHONE HYDROCHLORIDE 1 MG: 1 INJECTION, SOLUTION INTRAMUSCULAR; INTRAVENOUS; SUBCUTANEOUS at 14:03

## 2023-05-10 RX ADMIN — OXYCODONE HYDROCHLORIDE 15 MG: 5 TABLET ORAL at 19:59

## 2023-05-10 RX ADMIN — Medication 4 MG: at 21:52

## 2023-05-10 ASSESSMENT — ENCOUNTER SYMPTOMS
CHILLS: 0
WHEEZING: 1
BACK PAIN: 1
TROUBLE SWALLOWING: 0
VOMITING: 0
CHEST TIGHTNESS: 1
NECK PAIN: 0
ABDOMINAL PAIN: 0
DIARRHEA: 0
CONFUSION: 0
NAUSEA: 0
PALPITATIONS: 0
NUMBNESS: 0
SHORTNESS OF BREATH: 1
WEAKNESS: 0
ADENOPATHY: 0
DIFFICULTY URINATING: 0
HEADACHES: 0
FEVER: 0
COUGH: 0

## 2023-05-10 ASSESSMENT — ACTIVITIES OF DAILY LIVING (ADL)
ADLS_ACUITY_SCORE: 35
ADLS_ACUITY_SCORE: 35
ADLS_ACUITY_SCORE: 33

## 2023-05-10 ASSESSMENT — PAIN SCALES - GENERAL: PAINLEVEL: EXTREME PAIN (9)

## 2023-05-10 NOTE — TELEPHONE ENCOUNTER
Requesting a call back in regards to her health and symptoms she is having.   Currently she is having having problems with her breathing  O2 levels at 91-92%.   Having difficulty walking long distances without getting SOB.   Using nebulizer every 4-6 hours treatments do not last a whole hour before she has to do another treatment.   When she wakes up she is having trouble breathing to the point where she feels like her lungs are closed.   Pain in lung area.     Can audibly hear patient wheezing on phone.     Advised to go to ER. Is coughing and audibly wheezing on phone.     Wants to have Dr Duncan call her back.     16:36 Paged Dr Duncan to call patient back directly.       Will go to [place] [facility]     Call placed to [facility] gave nurse to nurse report to Rufina DIAZ.

## 2023-05-10 NOTE — PROGRESS NOTES
Social Work Note: Transportation  Oncology Clinic     Data/Intervention:  Patient Name:  Jennifer Cervantes DOB/Age: 2000, 23 years old     Call From: Patient Called Triage Line        Reason for Call:  Transportation     Assessment:   called [employer] to arrange ride through patient's insurance. [employer] arranged  for patient from home with [employer].  Patient will need to call when ready for return ride home ([phone]).      Plan:  Patient is aware of the transportation plan.  available to assist with any other needs.      CARLOS Chavez,[facility]  Hematology/Oncology Social Worker  Phone:[phone] Pager: [phone]

## 2023-05-10 NOTE — PROGRESS NOTES
"Infusion Nursing Note:  Jennifer Cervantes presents today for IVF and dilaudid.    Patient seen by provider today: No      Note:     Pt arrives today with c/o worsening ORTEGA since Monday (last visit here with ALEKSANDER Mantilla NP). States going up and down stairs feels harder; \"it takes 1-2 mins to catch my breath walking down stairs- front door back to my room or just waking around cleaning or anything like that I have to sit down for a second\". Using her scheduled inhalers and nebulizer and that only lasts 2 hours. Reports her cough is the same as Monday, no sputum.     Denies fevers/chills, no uti symptoms, or open sores. Reports \"a little\" chest pain, but only when she 'coughs too hard'    Oxygen 90 -92%. RR normal.     Notified ALEKSANDER Mantilla NP of above information; CT r/o PE ordered. Unfortunately, tomorrow at 1000 is the soonest available for the scan because it's done at the hospital in nuclear medicine (she has contrast allergy)    Final pain number 5/10.     Oxygen sats 85-87% lying down upon completion of infusion; pt sleepy but easily arousable. Pt sitting up oxygen 86-90%. No new complaints from patient. OVSS. Updated ALEKSANDER Mantilla NP who advised to apply 2L NC and continue to monitor. Confirm patient's oxygen back to baseline (low 92') on RA prior to discharge.     Oxygen 93% on RA prior to discharge. Pt feels ready to discharge and verbalized to seek medical attention immediately if she develops worsening SOB; more coughing, feeling lightheaded or dizzy or lips turn blue.       Intravenous Access:  Implanted Port.    Treatment Conditions:  Not Applicable.      Post Infusion Assessment:  Patient tolerated infusion without incident.  Blood return noted pre and post infusion.  Site patent and intact, free from redness, edema or discomfort.  No evidence of extravasations.  Access discontinued per protocol.       Discharge Plan:   Discharge instructions reviewed with: Patient.  AVS to patient via [employer].  Patient will return " 5/17 for next appointment.   Patient discharged in stable condition accompanied by: self.  Departure Mode: Ambulatory.      Kelsey Knapp RN

## 2023-05-10 NOTE — ED TRIAGE NOTES
Patient presents to the ED with SCD pain crisis in lower back and w/ CP. Pt reports SOB for over a week. Pt states pain 10/10     Triage Assessment     Row Name 05/10/23 [id number]       Triage Assessment (Adult)    Airway WDL WDL       Respiratory WDL    Respiratory WDL X  SOB       Skin Circulation/Temperature WDL    Skin Circulation/Temperature WDL WDL       Cardiac WDL    Cardiac WDL X;chest pain       Peripheral/Neurovascular WDL    Peripheral Neurovascular WDL WDL       Cognitive/Neuro/Behavioral WDL    Cognitive/Neuro/Behavioral WDL WDL

## 2023-05-10 NOTE — TELEPHONE ENCOUNTER
Oncology Nurse Triage - Sickle Cell Pain Crisis:  Situation: Jennifer  calling about Sickle Cell Pain Crisis, requesting to be added on for IV fluids and pain medicine    Background:   Patient's last infusion was 5/8/23  Last clinic visit date: 5/8/23  Does patient have active treatment plan?  Yes    Assessment of Symptoms:  Onset/Duration of symptoms: 2 day    Is it typical sickle cell pain? Yes   Location: lower back, L side  Character: Sharp           Intensity: 10/10    Any radiation of pain, numbness, tingling, weakness, warmth, swelling, discoloration of arms or legs?No     Fever?No    Chest Pain Present: No     Shortness of breath: No     Other home therapies tried: HEAT/HEATING PAD     Last home medication taken and when: 0600 Oxycodone    Any Refills Needed?: No     Does patient have transportation & length of time to get to clinic: No - needs ride set up    Recommendations:   Added to infusion wait list   0706 message sent to Patricia Mantilla, as pt was in on 5/8 for last infusion. 0714 okayed by Patricia if pt can get in to infusion.  0841 call to pt to offer 12pm appt, pt accepting. Okayed by jonny Torres RN in [facility].   0842 message sent to  to help arrange transportation.   0844 message sent to scheduling.     If you do not hear from the infusion center by 2pm then you will not be able to get in for an infusion today. If symptoms worsen while waiting for call back, and/or you experience fever, chills, SOB, chest pain, cough, n/v, dizziness, numbness, swelling, discoloration of extremities, then seek emergency evaluation in Emergency Department.     Please note, if you are late for your appt, you risk losing your infusion appt as it may delay another patient's infusion who arrived on time.

## 2023-05-10 NOTE — PATIENT INSTRUCTIONS
Go to the ED if your symptoms worsen; including feeling more short of breath, more coughing, feeling lightheaded, or dizzy, lips turn blue.

## 2023-05-11 ENCOUNTER — APPOINTMENT (OUTPATIENT)
Dept: NUCLEAR MEDICINE | Facility: CLINIC | Age: 23
DRG: 811 | End: 2023-05-11
Payer: COMMERCIAL

## 2023-05-11 ENCOUNTER — APPOINTMENT (OUTPATIENT)
Dept: CT IMAGING | Facility: CLINIC | Age: 23
DRG: 811 | End: 2023-05-11
Payer: COMMERCIAL

## 2023-05-11 LAB
ALBUMIN SERPL BCG-MCNC: 4.2 G/DL (ref 3.5–5.2)
ALP SERPL-CCNC: 71 U/L (ref 35–104)
ALT SERPL W P-5'-P-CCNC: 12 U/L (ref 10–35)
ANION GAP SERPL CALCULATED.3IONS-SCNC: 11 MMOL/L (ref 7–15)
AST SERPL W P-5'-P-CCNC: 27 U/L (ref 10–35)
ATRIAL RATE - MUSE: 100 BPM
BASOPHILS # BLD AUTO: 0.1 10E3/UL (ref 0–0.2)
BASOPHILS NFR BLD AUTO: 1 %
BILIRUB DIRECT SERPL-MCNC: 0.43 MG/DL (ref 0–0.3)
BILIRUB SERPL-MCNC: 1.8 MG/DL
BUN SERPL-MCNC: 6.3 MG/DL (ref 6–20)
CALCIUM SERPL-MCNC: 8.9 MG/DL (ref 8.6–10)
CHLORIDE SERPL-SCNC: 109 MMOL/L (ref 98–107)
CREAT SERPL-MCNC: 0.53 MG/DL (ref 0.51–0.95)
DEPRECATED HCO3 PLAS-SCNC: 19 MMOL/L (ref 22–29)
DIASTOLIC BLOOD PRESSURE - MUSE: NORMAL MMHG
EOSINOPHIL # BLD AUTO: 0 10E3/UL (ref 0–0.7)
EOSINOPHIL NFR BLD AUTO: 0 %
ERYTHROCYTE [DISTWIDTH] IN BLOOD BY AUTOMATED COUNT: 27.1 % (ref 10–15)
GFR SERPL CREATININE-BSD FRML MDRD: >90 ML/MIN/1.73M2
GLUCOSE SERPL-MCNC: 137 MG/DL (ref 70–99)
HCG SERPL QL: NEGATIVE
HCT VFR BLD AUTO: 19.6 % (ref 35–47)
HGB BLD-MCNC: 6.4 G/DL (ref 11.7–15.7)
IMM GRANULOCYTES # BLD: 0.1 10E3/UL
IMM GRANULOCYTES NFR BLD: 1 %
INTERPRETATION ECG - MUSE: NORMAL
LYMPHOCYTES # BLD AUTO: 1.2 10E3/UL (ref 0.8–5.3)
LYMPHOCYTES NFR BLD AUTO: 8 %
MCH RBC QN AUTO: 28.6 PG (ref 26.5–33)
MCHC RBC AUTO-ENTMCNC: 32.7 G/DL (ref 31.5–36.5)
MCV RBC AUTO: 88 FL (ref 78–100)
MONOCYTES # BLD AUTO: 0.6 10E3/UL (ref 0–1.3)
MONOCYTES NFR BLD AUTO: 4 %
NEUTROPHILS # BLD AUTO: 12.8 10E3/UL (ref 1.6–8.3)
NEUTROPHILS NFR BLD AUTO: 86 %
NRBC # BLD AUTO: 0.8 10E3/UL
NRBC BLD AUTO-RTO: 5 /100
P AXIS - MUSE: 81 DEGREES
PLATELET # BLD AUTO: 474 10E3/UL (ref 150–450)
POTASSIUM SERPL-SCNC: 4.3 MMOL/L (ref 3.4–5.3)
PR INTERVAL - MUSE: 116 MS
PROT SERPL-MCNC: 7.2 G/DL (ref 6.4–8.3)
QRS DURATION - MUSE: 70 MS
QT - MUSE: 360 MS
QTC - MUSE: 464 MS
R AXIS - MUSE: 43 DEGREES
RBC # BLD AUTO: 2.24 10E6/UL (ref 3.8–5.2)
RETICS # AUTO: 0.45 10E6/UL (ref 0.03–0.1)
RETICS/RBC NFR AUTO: 20 % (ref 0.5–2)
SODIUM SERPL-SCNC: 139 MMOL/L (ref 136–145)
SYSTOLIC BLOOD PRESSURE - MUSE: NORMAL MMHG
T AXIS - MUSE: 44 DEGREES
VENTRICULAR RATE- MUSE: 100 BPM
WBC # BLD AUTO: 14.8 10E3/UL (ref 4–11)

## 2023-05-11 PROCEDURE — 343N000001 HC RX 343: Performed by: STUDENT IN AN ORGANIZED HEALTH CARE EDUCATION/TRAINING PROGRAM

## 2023-05-11 PROCEDURE — 250N000011 HC RX IP 250 OP 636

## 2023-05-11 PROCEDURE — 120N000002 HC R&B MED SURG/OB UMMC

## 2023-05-11 PROCEDURE — 250N000012 HC RX MED GY IP 250 OP 636 PS 637: Performed by: INTERNAL MEDICINE

## 2023-05-11 PROCEDURE — 99207 PR APP CREDIT; MD BILLING SHARED VISIT: CPT | Performed by: STUDENT IN AN ORGANIZED HEALTH CARE EDUCATION/TRAINING PROGRAM

## 2023-05-11 PROCEDURE — 84703 CHORIONIC GONADOTROPIN ASSAY: CPT | Performed by: INTERNAL MEDICINE

## 2023-05-11 PROCEDURE — 94640 AIRWAY INHALATION TREATMENT: CPT | Mod: 76

## 2023-05-11 PROCEDURE — 250N000013 HC RX MED GY IP 250 OP 250 PS 637

## 2023-05-11 PROCEDURE — 272N000035 NM LUNG SCAN VENTILATION AND PERFUSION

## 2023-05-11 PROCEDURE — 999N000157 HC STATISTIC RCP TIME EA 10 MIN

## 2023-05-11 PROCEDURE — 258N000003 HC RX IP 258 OP 636: Performed by: STUDENT IN AN ORGANIZED HEALTH CARE EDUCATION/TRAINING PROGRAM

## 2023-05-11 PROCEDURE — 80053 COMPREHEN METABOLIC PANEL: CPT

## 2023-05-11 PROCEDURE — 78582 LUNG VENTILAT&PERFUS IMAGING: CPT

## 2023-05-11 PROCEDURE — 250N000009 HC RX 250: Performed by: INTERNAL MEDICINE

## 2023-05-11 PROCEDURE — 94640 AIRWAY INHALATION TREATMENT: CPT

## 2023-05-11 PROCEDURE — 85045 AUTOMATED RETICULOCYTE COUNT: CPT | Performed by: STUDENT IN AN ORGANIZED HEALTH CARE EDUCATION/TRAINING PROGRAM

## 2023-05-11 PROCEDURE — 85018 HEMOGLOBIN: CPT

## 2023-05-11 PROCEDURE — 71250 CT THORAX DX C-: CPT

## 2023-05-11 PROCEDURE — 250N000011 HC RX IP 250 OP 636: Performed by: STUDENT IN AN ORGANIZED HEALTH CARE EDUCATION/TRAINING PROGRAM

## 2023-05-11 PROCEDURE — 258N000003 HC RX IP 258 OP 636

## 2023-05-11 PROCEDURE — 99221 1ST HOSP IP/OBS SF/LOW 40: CPT | Mod: GC | Performed by: STUDENT IN AN ORGANIZED HEALTH CARE EDUCATION/TRAINING PROGRAM

## 2023-05-11 PROCEDURE — 82248 BILIRUBIN DIRECT: CPT | Performed by: STUDENT IN AN ORGANIZED HEALTH CARE EDUCATION/TRAINING PROGRAM

## 2023-05-11 PROCEDURE — A9567 TECHNETIUM TC-99M AEROSOL: HCPCS | Performed by: STUDENT IN AN ORGANIZED HEALTH CARE EDUCATION/TRAINING PROGRAM

## 2023-05-11 PROCEDURE — 78582 LUNG VENTILAT&PERFUS IMAGING: CPT | Mod: 26

## 2023-05-11 PROCEDURE — 36415 COLL VENOUS BLD VENIPUNCTURE: CPT | Performed by: INTERNAL MEDICINE

## 2023-05-11 PROCEDURE — 250N000013 HC RX MED GY IP 250 OP 250 PS 637: Performed by: INTERNAL MEDICINE

## 2023-05-11 PROCEDURE — A9540 TC99M MAA: HCPCS | Performed by: STUDENT IN AN ORGANIZED HEALTH CARE EDUCATION/TRAINING PROGRAM

## 2023-05-11 PROCEDURE — 250N000009 HC RX 250: Performed by: STUDENT IN AN ORGANIZED HEALTH CARE EDUCATION/TRAINING PROGRAM

## 2023-05-11 PROCEDURE — 71250 CT THORAX DX C-: CPT | Mod: 26 | Performed by: RADIOLOGY

## 2023-05-11 RX ORDER — NALOXONE HYDROCHLORIDE 0.4 MG/ML
0.2 INJECTION, SOLUTION INTRAMUSCULAR; INTRAVENOUS; SUBCUTANEOUS
Status: DISCONTINUED | OUTPATIENT
Start: 2023-05-11 | End: 2023-05-16 | Stop reason: HOSPADM

## 2023-05-11 RX ORDER — POLYETHYLENE GLYCOL 3350 17 G/17G
17 POWDER, FOR SOLUTION ORAL 2 TIMES DAILY
Status: DISCONTINUED | OUTPATIENT
Start: 2023-05-11 | End: 2023-05-16 | Stop reason: HOSPADM

## 2023-05-11 RX ORDER — NALOXONE HYDROCHLORIDE 0.4 MG/ML
0.4 INJECTION, SOLUTION INTRAMUSCULAR; INTRAVENOUS; SUBCUTANEOUS
Status: DISCONTINUED | OUTPATIENT
Start: 2023-05-11 | End: 2023-05-16 | Stop reason: HOSPADM

## 2023-05-11 RX ORDER — ALBUTEROL SULFATE 0.83 MG/ML
5 SOLUTION RESPIRATORY (INHALATION)
Status: DISCONTINUED | OUTPATIENT
Start: 2023-05-11 | End: 2023-05-12

## 2023-05-11 RX ORDER — ACETAMINOPHEN 325 MG/1
975 TABLET ORAL 3 TIMES DAILY
Status: DISCONTINUED | OUTPATIENT
Start: 2023-05-11 | End: 2023-05-16 | Stop reason: HOSPADM

## 2023-05-11 RX ORDER — OXYCODONE HYDROCHLORIDE 10 MG/1
20 TABLET ORAL EVERY 4 HOURS
Status: DISCONTINUED | OUTPATIENT
Start: 2023-05-11 | End: 2023-05-13

## 2023-05-11 RX ORDER — ENOXAPARIN SODIUM 100 MG/ML
40 INJECTION SUBCUTANEOUS EVERY 24 HOURS
Status: DISCONTINUED | OUTPATIENT
Start: 2023-05-12 | End: 2023-05-16 | Stop reason: HOSPADM

## 2023-05-11 RX ORDER — KETOROLAC TROMETHAMINE 30 MG/ML
30 INJECTION, SOLUTION INTRAMUSCULAR; INTRAVENOUS EVERY 6 HOURS
Status: DISCONTINUED | OUTPATIENT
Start: 2023-05-11 | End: 2023-05-16 | Stop reason: HOSPADM

## 2023-05-11 RX ORDER — DEFERASIROX 360 MG/1
1440 TABLET, FILM COATED ORAL EVERY EVENING
Status: DISCONTINUED | OUTPATIENT
Start: 2023-05-11 | End: 2023-05-16 | Stop reason: HOSPADM

## 2023-05-11 RX ORDER — HYDROMORPHONE HYDROCHLORIDE 1 MG/ML
0.5 INJECTION, SOLUTION INTRAMUSCULAR; INTRAVENOUS; SUBCUTANEOUS ONCE
Status: COMPLETED | OUTPATIENT
Start: 2023-05-11 | End: 2023-05-11

## 2023-05-11 RX ORDER — PREDNISONE 20 MG/1
40 TABLET ORAL DAILY
Status: COMPLETED | OUTPATIENT
Start: 2023-05-11 | End: 2023-05-15

## 2023-05-11 RX ADMIN — KETAMINE HYDROCHLORIDE 6 MG/HR: 10 INJECTION, SOLUTION INTRAMUSCULAR; INTRAVENOUS at 21:09

## 2023-05-11 RX ADMIN — HYDROMORPHONE HYDROCHLORIDE 0.5 MG: 1 INJECTION, SOLUTION INTRAMUSCULAR; INTRAVENOUS; SUBCUTANEOUS at 09:52

## 2023-05-11 RX ADMIN — HYDROXYUREA 3000 MG: 500 CAPSULE ORAL at 11:51

## 2023-05-11 RX ADMIN — OXYCODONE HYDROCHLORIDE 20 MG: 10 TABLET ORAL at 18:21

## 2023-05-11 RX ADMIN — ENOXAPARIN SODIUM 70 MG: 80 INJECTION SUBCUTANEOUS at 12:16

## 2023-05-11 RX ADMIN — ALBUTEROL SULFATE 5 MG: 2.5 SOLUTION RESPIRATORY (INHALATION) at 02:53

## 2023-05-11 RX ADMIN — OXYCODONE HYDROCHLORIDE 20 MG: 10 TABLET ORAL at 14:15

## 2023-05-11 RX ADMIN — FLUOXETINE HYDROCHLORIDE 10 MG: 10 CAPSULE ORAL at 11:56

## 2023-05-11 RX ADMIN — ALBUTEROL SULFATE 2.5 MG: 2.5 SOLUTION RESPIRATORY (INHALATION) at 20:35

## 2023-05-11 RX ADMIN — PANTOPRAZOLE SODIUM 40 MG: 40 TABLET, DELAYED RELEASE ORAL at 11:50

## 2023-05-11 RX ADMIN — SODIUM CHLORIDE: 9 INJECTION, SOLUTION INTRAVENOUS at 00:44

## 2023-05-11 RX ADMIN — KIT FOR THE PREPARATION OF TECHNETIUM TC 99M PENTETATE 63 MILLICURIE: 20 INJECTION, POWDER, LYOPHILIZED, FOR SOLUTION INTRAVENOUS; RESPIRATORY (INHALATION) at 10:50

## 2023-05-11 RX ADMIN — OXYCODONE HYDROCHLORIDE 20 MG: 10 TABLET ORAL at 22:18

## 2023-05-11 RX ADMIN — KETOROLAC TROMETHAMINE 30 MG: 30 INJECTION, SOLUTION INTRAMUSCULAR; INTRAVENOUS at 23:39

## 2023-05-11 RX ADMIN — OXYCODONE HYDROCHLORIDE 20 MG: 10 TABLET ORAL at 02:33

## 2023-05-11 RX ADMIN — ALBUTEROL SULFATE 5 MG: 2.5 SOLUTION RESPIRATORY (INHALATION) at 13:33

## 2023-05-11 RX ADMIN — KETOROLAC TROMETHAMINE 30 MG: 30 INJECTION, SOLUTION INTRAMUSCULAR; INTRAVENOUS at 18:21

## 2023-05-11 RX ADMIN — DOCUSATE SODIUM 50 MG AND SENNOSIDES 8.6 MG 2 TABLET: 8.6; 5 TABLET, FILM COATED ORAL at 11:50

## 2023-05-11 RX ADMIN — KIT FOR THE PREPARATION OF TECHNETIUM TC 99M ALBUMIN AGGREGATED 7.2 MILLICURIE: 2.5 INJECTION, POWDER, FOR SOLUTION INTRAVENOUS at 10:51

## 2023-05-11 RX ADMIN — OXYCODONE HYDROCHLORIDE 20 MG: 10 TABLET ORAL at 06:05

## 2023-05-11 RX ADMIN — ALBUTEROL SULFATE 5 MG: 2.5 SOLUTION RESPIRATORY (INHALATION) at 07:54

## 2023-05-11 RX ADMIN — SODIUM CHLORIDE: 9 INJECTION, SOLUTION INTRAVENOUS at 12:19

## 2023-05-11 RX ADMIN — PREDNISONE 40 MG: 20 TABLET ORAL at 11:50

## 2023-05-11 RX ADMIN — OXYCODONE HYDROCHLORIDE 20 MG: 10 TABLET ORAL at 09:52

## 2023-05-11 ASSESSMENT — ACTIVITIES OF DAILY LIVING (ADL)
ADLS_ACUITY_SCORE: 35

## 2023-05-11 NOTE — PROGRESS NOTES
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  # Acute pain crisis  # History of CVA managed with transfusions, complicated by iron overload  # Acute normocytic anemia, Hb 6.4 today  - Current pain management plan:   - Ketamine 6mg/hr (per care plan, do not increase further because Jennifer has felt quite poorly with 8mg/hr in the past)   - Currently on 20mg oxycodone q4h scheduled    - Start Toradol q6h scheduled   - Acetaminophen 975mg TID scheduled    - Miralax and senna BID scheduled   - mIVF with NS at 75mL/hr  - Hematology consulted, appreciate recommendations   - No concern for acute chest at this time, so switch therapeutic enoxaparin to prophylactic dosing   - Don't transfuse without discussing with hematology first   - Continue PTA deferasirox daily  - Continue PTA hydroxyurea    # Acute hypoxic respiratory failure secondary to asthma exacerbation  # Moderate persistent asthma  V/Q scan negative for PE.   - Albuterol nebs q6h scheduled and PRN  - s/p solumedrol IV x1 in the ED; complete a 5-day course of  "prednisone 40mg daily   - Continue PTA ICS/LABA controller inhaler    # Reactive thrombocytosis  Continue to monitor clinically.    # Leukocytosis  WBC 18.2 on admission, actually improved from 21.3 drawn three days prior to admission. Procal borderline at 0.31. No obvious source of infection on workup this far-- suspect that this is reactive in light of acute pain crisis and steroids given for asthma exacerbation. Continue to monitor for developing infection.      # Depression  - Continue PTA fluoxetine daily      Diet: Combination Diet Regular Diet Adult    DVT Prophylaxis: Enoxaparin (Lovenox) SQ  Lopez Catheter: Not present  Lines: PRESENT      Port A Cath Single 04/21/21 Left Chest wall-Site Assessment: WDL      Cardiac Monitoring: None  Code Status: Full Code      Clinically Significant Risk Factors Present on Admission               # Coagulation Defect: INR = 1.32 (Ref range: 0.85 - 1.15) and/or PTT = N/A, will monitor for bleeding  # Drug Induced Platelet Defect: home medication list includes an antiplatelet medication        # Overweight: Estimated body mass index is 27.46 kg/m  as calculated from the following:    Height as of this encounter: 1.626 m (5' 4\").    Weight as of this encounter: 72.6 kg (160 lb).            Disposition Plan     Expected Discharge Date: 05/12/2023                  Ivy Montanez MD  Hospitalist Service, [address]  Securely message with [facility] (more info)  Text page via [facility] Paging/Directory   See signed in provider for up to date coverage information  ______________________________________________________________________    Interval History   No acute events over night. Jennifer was in quite a bit of pain when I saw her this morning and requested a dose of IV pain medication so that she could lay still for her VQ scan. She denies shortness of breath. She's planning on eating breakfast later when her pain is better. " Agreeable to plan of care going forward, including heme consult.     Physical Exam   Vital Signs: Temp: 97.6  F (36.4  C) Temp src: Pulmonary Artery BP: 109/64 Pulse: 99   Resp: (!) 9 SpO2: 98 % O2 Device: Nasal cannula Oxygen Delivery: 2 LPM  Weight: 160 lbs 0 oz    GENERAL: The patient is awake and alert, appears uncomfortable.  HEAD: Atraumatic, normocephalic. Sclerae are white without injection or icterus.  NOSE: Without deformity, bleeding or discharge.   ORAL CAVITY: No swelling or abnormality to the lip or teeth. Oral mucosa is pink and moist.   NECK: No signs of meningismus. No adenopathy is noted. No JVD is seen.   LUNGS: On 2LPM NC when I saw her this morning; no shortness of breath. No increased work of breathing, good air movement in all lung fields.  ABDOMEN: Soft, nondistended.  PSYCHIATRIC: The patient is oriented x4. Mood and affect are appropriate.     Data     I have personally reviewed the following data over the past 24 hrs:    14.8 (H)  \   6.4 (LL)   / 474 (H)     139 109 (H) 6.3 /  137 (H)   4.3 19 (L) 0.53 \       ALT: 12 AST: 27 AP: 71 TBILI: 1.8 (H)   ALB: 4.2 TOT PROTEIN: 7.2 LIPASE: N/A       Trop: <6 BNP: 53       Procal: 0.31 (H) CRP: 17.10 (H) Lactic Acid: 0.8       INR:  1.32 (H) PTT:  N/A   D-dimer:  2.22 (H) Fibrinogen:  N/A       Ferritin:  N/A % Retic:  20.0 (H) LDH:  N/A

## 2023-05-11 NOTE — ED PROVIDER NOTES
ED Provider Note  [facility]      History     Chief Complaint   Patient presents with     Shortness of Breath     Sickle Cell Pain Crisis     HPI  Jennifer Cervantes is a 23 year old female who presents with 1 week of progressive dyspnea, chest pain on inspiration, wheezing, dyspnea on exertion and lower back pain. She has been using home nebulizers without relief. She was in the infusion center this afternoon and was treated with fluids and pain medications, however her dyspnea worsened after going home. She called her doctor and was advised to come in to the ED. She has a history of sickle cell disease with history of stroke and multiple thromboembolic complications. She is having no fever, chills, URI symptoms, cough, nausea, vomiting or abdominal pain. She has no leg pain or swelling. She has had no known ill exposures.    Past Medical History:   Diagnosis Date     Anxiety      Bleeding disorder (H)      Blood clotting disorder (H)      Cerebral infarction (H) 2015     Cognitive developmental delay     low IQ. Please recognize when managing pain and planning with her     Depressive disorder      Hemiplegia and hemiparesis following cerebral infarction affecting right dominant side (H)     right hand contractures     Iron overload due to repeated red blood cell transfusions      Migraines      Multiple subsegmental pulmonary emboli without acute cor pulmonale (H) 02/01/2021     Oppositional defiant behavior      Presence of intrauterine contraceptive device 2/18/2020     Superficial venous thrombosis of arm, right 03/25/2021     Uncomplicated asthma        Past Surgical History:   Procedure Laterality Date     AS INSERT TUNNELED CV 2 CATH W/O PORT/PUMP       CHOLECYSTECTOMY       CV RIGHT HEART CATH MEASUREMENTS RECORDED N/A 11/18/2021    Procedure: Right Heart Cath;  Surgeon: Jackson Stauffer MD;  Location:  HEART CARDIAC CATH LAB     INSERT PORT VASCULAR ACCESS Left 4/21/2021     Procedure: INSERTION, VASCULAR ACCESS PORT (NOT SURE ON SIDE UNTIL REMOVAL);  Surgeon: Rajan More MD;  Location: [facility] OR     IR CHEST PORT PLACEMENT > 5 YRS OF AGE  4/21/2021     IR CVC NON TUNNEL LINE REMOVAL  5/6/2021     IR CVC NON TUNNEL PLACEMENT > 5 YRS  04/07/2020     IR CVC NON TUNNEL PLACEMENT > 5 YRS  4/30/2021     IR CVC NON TUNNEL PLACEMENT > 5 YRS  9/7/2022     IR PORT REMOVAL LEFT  4/21/2021     REMOVE PORT VASCULAR ACCESS Left 4/21/2021    Procedure: REMOVAL, VASCULAR ACCESS PORT LEFT;  Surgeon: Rajan More MD;  Location: [facility] OR     REPAIR TENDON ELBOW Right 10/02/2019    Procedure: Right Elbow Flexor Lengthening, Flexor Pronator Slide Of Wrist and Finger, Thumb Adductor Lengthening;  Surgeon: Anai Franco MD;  Location: [facility] OR     TONSILLECTOMY Bilateral 10/02/2019    Procedure: Bilateral Tonsillectomy;  Surgeon: Farhana Guy MD;  Location: [facility] OR     ZZC BREAST REDUCTION (INCLUDES LIPO) TIER 3 Bilateral 04/23/2019       Family History   Problem Relation Age of Onset     Sickle Cell Trait Mother      Hypertension Mother      Asthma Mother      Sickle Cell Trait Father      Glaucoma No family hx of      Macular Degeneration No family hx of      Diabetes No family hx of      Gout No family hx of        Social History     Tobacco Use     Smoking status: Never     Smokeless tobacco: Never   Vaping Use     Vaping status: Not on file   Substance Use Topics     Alcohol use: Not Currently     Alcohol/week: 0.0 standard drinks of alcohol         Review of Systems   Constitutional: Negative for chills and fever.   HENT: Negative for congestion and trouble swallowing.    Eyes: Negative for visual disturbance.   Respiratory: Positive for chest tightness, shortness of breath and wheezing. Negative for cough.    Cardiovascular: Positive for chest pain. Negative for palpitations and leg swelling.   Gastrointestinal: Negative for abdominal pain, diarrhea, nausea and vomiting.  "  Genitourinary: Negative for difficulty urinating.   Musculoskeletal: Positive for back pain. Negative for neck pain.   Skin: Negative for rash.   Neurological: Negative for weakness, numbness and headaches.   Hematological: Negative for adenopathy.   Psychiatric/Behavioral: Negative for confusion.       Physical Exam   BP: 132/70  Pulse: 102  Temp: 98.2  F (36.8  C)  Resp: 22  Height: 162.6 cm (5' 4\")  Weight: 72.6 kg (160 lb)  SpO2: 92 %  Physical Exam  Vitals and nursing note reviewed.   Constitutional:       General: She is in acute distress.      Appearance: She is well-developed.   HENT:      Head: Normocephalic and atraumatic.      Right Ear: External ear normal.      Left Ear: External ear normal.      Nose: Nose normal.      Mouth/Throat:      Mouth: Mucous membranes are moist.      Pharynx: No pharyngeal swelling.   Eyes:      General: Scleral icterus present.      Extraocular Movements: Extraocular movements intact.      Pupils: Pupils are equal, round, and reactive to light.   Neck:      Vascular: No carotid bruit.   Cardiovascular:      Rate and Rhythm: Regular rhythm. Tachycardia present.      Heart sounds: No murmur heard.  Pulmonary:      Effort: Respiratory distress (mild) present.      Breath sounds: No stridor. Wheezing present. No rales.   Abdominal:      Tenderness: There is no abdominal tenderness. There is no guarding.   Musculoskeletal:      Cervical back: Normal range of motion and neck supple.      Right lower leg: No edema.      Left lower leg: No edema.   Skin:     General: Skin is warm and dry.   Neurological:      General: No focal deficit present.      Mental Status: She is alert and oriented to person, place, and time.      Cranial Nerves: No cranial nerve deficit.   Psychiatric:         Mood and Affect: Mood normal.         Behavior: Behavior normal.           ED Course, Procedures, & Data      Procedures       ED Course Selections:        EKG Interpretation:      Interpreted by " DORI GRANDA MD  Time reviewed: 1914  Symptoms at time of EKG: dyspnea   Rhythm: sinus tachycardia  Rate: 100-110  Axis: Normal  Ectopy: none  Conduction: normal  ST Segments/ T Waves: No ST-T wave changes and No acute ischemic changes  Q Waves: none  Comparison to prior: Unchanged from 3/27/23    Clinical Impression: sinus tachycardia          Labs/Imaging    Results for orders placed or performed during the hospital encounter of 05/10/23 (from the past 24 hour(s))   EKG 12-lead, tracing only   Result Value Ref Range    Systolic Blood Pressure  mmHg    Diastolic Blood Pressure  mmHg    Ventricular Rate 100 BPM    Atrial Rate 100 BPM    AK Interval 116 ms    QRS Duration 70 ms     ms    QTc 464 ms    P Axis 81 degrees    R AXIS 43 degrees    T Axis 44 degrees    Interpretation ECG Sinus rhythm  Normal ECG      XR Chest Port 1 View    Narrative    EXAM: Chest radiograph 5/10/2023 7:44 PM    HISTORY: 23 years Female dyspnea.     COMPARISON: Chest radiograph 5/8/2023.    TECHNIQUE: Frontal semiupright radiograph of the chest.    FINDINGS:   Left chest wall Port-A-Cath with distal tip in the low SVC. Trachea is  midline. Normal cardiac mediastinal silhouette. Poor inspiration.  Trace left costophrenic angle blunting, stable compared to prior exam  on 5/8/2023. No discernible pneumothorax. No focal airspace or  interstitial opacities. Mild asymmetric elevation of the right  hemidiaphragm. Right upper quadrant surgical clip. No acute or  suspicious osseous abnormality. Unremarkable soft tissues.      Impression    IMPRESSION:   1. Trace left pleural effusion, unchanged from prior exam.  2. No acute pulmonary consolidation or significant airspace disease.    I have personally reviewed the examination and initial interpretation  and I agree with the findings.    KEN BARBOUR DO         SYSTEM ID:  [id number]   CBC with platelets differential    Narrative    The following orders were created for panel order CBC  with platelets differential.  Procedure                               Abnormality         Status                     ---------                               -----------         ------                     CBC with platelets and d...[[id number]]  Abnormal            Final result                 Please view results for these tests on the individual orders.   INR   Result Value Ref Range    INR 1.32 (H) 0.85 - 1.15   D dimer quantitative   Result Value Ref Range    D-Dimer Quantitative 2.22 (H) 0.00 - 0.50 ug/mL FEU    Narrative    This D-dimer assay is intended for use in conjunction with a clinical pretest probability assessment model to exclude pulmonary embolism (PE) and deep venous thrombosis (DVT) in outpatients suspected of PE or DVT. The cut-off value is 0.50 ug/mL FEU.   Comprehensive metabolic panel   Result Value Ref Range    Sodium 135 (L) 136 - 145 mmol/L    Potassium 3.7 3.4 - 5.3 mmol/L    Chloride 104 98 - 107 mmol/L    Carbon Dioxide (CO2) 19 (L) 22 - 29 mmol/L    Anion Gap 12 7 - 15 mmol/L    Urea Nitrogen 5.5 (L) 6.0 - 20.0 mg/dL    Creatinine 0.58 0.51 - 0.95 mg/dL    Calcium 8.9 8.6 - 10.0 mg/dL    Glucose 78 70 - 99 mg/dL    Alkaline Phosphatase 76 35 - 104 U/L    AST 33 10 - 35 U/L    ALT 14 10 - 35 U/L    Protein Total 7.4 6.4 - 8.3 g/dL    Albumin 4.4 3.5 - 5.2 g/dL    Bilirubin Total 3.2 (H) <=1.2 mg/dL    GFR Estimate >90 >60 mL/min/1.73m2   Troponin T, High Sensitivity   Result Value Ref Range    Troponin T, High Sensitivity <6 <=14 ng/L   CRP inflammation   Result Value Ref Range    CRP Inflammation 18.40 (H) <5.00 mg/L   Nt probnp inpatient (BNP)   Result Value Ref Range    N terminal Pro BNP Inpatient 53 0 - 450 pg/mL   CBC with platelets and differential   Result Value Ref Range    WBC Count 18.2 (H) 4.0 - 11.0 10e3/uL    RBC Count 2.26 (L) 3.80 - 5.20 10e6/uL    Hemoglobin 6.4 (LL) 11.7 - 15.7 g/dL    Hematocrit 19.7 (L) 35.0 - 47.0 %    MCV 87 78 - 100 fL    MCH 28.3 26.5 - 33.0 pg     MCHC 32.5 31.5 - 36.5 g/dL    RDW 27.2 (H) 10.0 - 15.0 %    Platelet Count 546 (H) 150 - 450 10e3/uL    % Neutrophils 66 %    % Lymphocytes 14 %    % Monocytes 8 %    % Eosinophils 9 %    % Basophils 2 %    % Immature Granulocytes 1 %    NRBCs per 100 WBC 5 (H) <1 /100    Absolute Neutrophils 12.2 (H) 1.6 - 8.3 10e3/uL    Absolute Lymphocytes 2.6 0.8 - 5.3 10e3/uL    Absolute Monocytes 1.4 (H) 0.0 - 1.3 10e3/uL    Absolute Eosinophils 1.6 (H) 0.0 - 0.7 10e3/uL    Absolute Basophils 0.3 (H) 0.0 - 0.2 10e3/uL    Absolute Immature Granulocytes 0.2 <=0.4 10e3/uL    Absolute NRBCs 0.8 10e3/uL     *Note: Due to a large number of results and/or encounters for the requested time period, some results have not been displayed. A complete set of results can be found in Results Review.                Medications   oxyCODONE (ROXICODONE) tablet 15 mg (has no administration in time range)   ketamine (KETALAR) injection 4 mg (has no administration in time range)   ipratropium - albuterol 0.5 mg/2.5 mg/3 mL (DUONEB) neb solution 3 mL (3 mLs Nebulization $Given 5/10/23 2000)   oxyCODONE (ROXICODONE) tablet 15 mg (15 mg Oral $Given 5/10/23 1959)   ketamine (KETALAR) injection 4 mg (4 mg Intravenous $Given 5/10/23 2024)   methylPREDNISolone sodium succinate (solu-MEDROL) injection 37.5 mg (37.5 mg Intravenous $Given 5/10/23 2056)       XR Chest Port 1 View   Final Result   IMPRESSION:    1. Trace left pleural effusion, unchanged from prior exam.   2. No acute pulmonary consolidation or significant airspace disease.      I have personally reviewed the examination and initial interpretation   and I agree with the findings.      KEN BARBOUR DO            SYSTEM ID:  [id number]      Lung vent and perf (NM)    (Results Pending)          Critical care was not performed.     Medical Decision Making  The patient's presentation was of high complexity (a chronic illness severe exacerbation, progression, or side effect of treatment).    The  patient's evaluation involved:  ordering and/or review of 3+ test(s) in this encounter (see separate area of note for details)    The patient's management necessitated high risk (a decision regarding hospitalization).      Assessment & Plan    Impression:  Young woman with history of sickle cell disease, ischemic stroke, thromboembolic complications, asthma and chronic pain presents with 4-5 days of central chest pain, worsening dyspnea, and wheezing. She has no acute infiltrates on CXR. She has no acute changes on EKG and troponin and BNP are normal. D-dimer is chronically elevated and is elevated today. She has IV contrast allergy. Given her chest pain on inspiration and dyspnea we will order V/Q scan to evaluate for PE. She has had past CT for PE with premedication, which would be another option to evaluate for recurrent PE. She is not currently on anticoagulation. Her hemoglobin is 6.6 which is somewhat lower than her baseline. She has no definite symptoms of infectious process. WBC is elevated at 18K but is in comparable range to past visits. Given the combination of chest pain, dyspnea, and wheezing with potential for contribution from sickle cell disease and potentially recurrent thromboembolism, she will be admitted for inpatient care. She was given duoneb and steroids for treatment of possible asthma exacerbation.    I have reviewed the nursing notes. I have reviewed the findings, diagnosis, plan and need for follow up with the patient.    New Prescriptions    No medications on file       Final diagnoses:   Sickle cell pain crisis (H)   Moderate asthma with exacerbation, unspecified whether persistent   Chest pain, unspecified type   Shortness of breath       Jose Manuel Claderón  [facility] EMERGENCY DEPARTMENT  5/10/2023     Jose Manuel Calderón MD  05/10/23 [id number]

## 2023-05-11 NOTE — H&P
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  Presented to the ED with T 98.2, P 99, RR 22, /78 and saturating 93% on 2L oxy via NC. Presented with c/o SOB, pain on inspiration and lower back pain. Sx not relieved with home nebs. The electrolytes were within the normal limits and the lea is 3.2. Procal is raised at 0.31. Trop is <6. WBC count raised at 18.2 with Hb at 6.4 and plt 546. D dimer is 2.22. Given the hx of contrast allergy would get V/Q scan. CXR showed trace pleural effusion unchanged from prior exam with no acute consolidations or airspace disease. Currently the acute pain crisis seems to be 2/2 asthma exacerbation vs PNA vs PE. Given the concern for possible infx will get a CT chest without contrast and manage accordingly. Will start on lovenox subcutaneous 1 mg/kg given the concern for possible PE and reassess after the V/Q scan.   - Oxycodone increased to 20 mg Q4 hrly PRN   - Ketamine 4mg/hr drip   - Albuterol as needed for SOB  - Cont PTA inhalers  - Cont PTA hydroxyurea  - Was given 2x Ketamin 4mg in the ED  - Was given 2x Oxycodone 15 mg in ED  - Given 1 dose of solumedrol in ED  - Cont 40 mg of Prednisone   - LR infusion was changed NS infusion given the concern with ketamine incompatibility with LR  - cont PTA deferasirox  - Will hold off on transfusion and will readdress with hematology  - CT chest w/o contrast  - V/Q scan in AM  -  "Repeat labs in AM  - Resp culture, MRSA nares, resp panel    #Hx of depression  - cont pta fluoxetine  - Holding PTA trazodone given pt on ketamine infusion       Diet: Combination Diet Regular Diet Adult    DVT Prophylaxis: Enoxaparin (Lovenox) SQ  Lopez Catheter: Not present  Fluids: NS infusion  Lines:   Port A Cath Single 04/21/21 Left Chest wall-Site Assessment: WDL      Cardiac Monitoring: None  Code Status: Full Code      Clinically Significant Risk Factors Present on Admission               # Coagulation Defect: INR = 1.32 (Ref range: 0.85 - 1.15) and/or PTT = N/A, will monitor for bleeding      # Acute Respiratory Failure: Documented O2 saturation < 91%.  Continue supplemental oxygen as needed     # Overweight: Estimated body mass index is 27.46 kg/m  as calculated from the following:    Height as of this encounter: 1.626 m (5' 4\").    Weight as of this encounter: 72.6 kg (160 lb).           Disposition Plan      Expected Discharge Date: 05/12/2023                The patient's care was discussed with the Attending Physician, Dr. Dixon .      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     Reports that was in the infusion center in the " AM for ORTEGA and pain. Reports that she did feel better after leaving the infusion center but subsequently her sxs worsened and she presented to the ED with ongoing pain in the lower back as well as increasing shortness of breath. She has been using home nebulizers without relief. She has a history of sickle cell disease with history of stroke and multiple thromboembolic complications. She is having no fever, chills, URI symptoms, cough, nausea, vomiting or abdominal pain. She has no leg pain or swelling. She has had no known ill exposures.      Past Medical History    Past Medical History:   Diagnosis Date     Anxiety      Bleeding disorder (H)      Blood clotting disorder (H)      Cerebral infarction (H) 2015     Cognitive developmental delay     low IQ. Please recognize when managing pain and planning with her     Depressive disorder      Hemiplegia and hemiparesis following cerebral infarction affecting right dominant side (H)     right hand contractures     Iron overload due to repeated red blood cell transfusions      Migraines      Multiple subsegmental pulmonary emboli without acute cor pulmonale (H) 02/01/2021     Oppositional defiant behavior      Presence of intrauterine contraceptive device 2/18/2020     Superficial venous thrombosis of arm, right 03/25/2021     Uncomplicated asthma        Past Surgical History   Past Surgical History:   Procedure Laterality Date     AS INSERT TUNNELED CV 2 CATH W/O PORT/PUMP       CHOLECYSTECTOMY       CV RIGHT HEART CATH MEASUREMENTS RECORDED N/A 11/18/2021    Procedure: Right Heart Cath;  Surgeon: Jackson Stauffer MD;  Location:  HEART CARDIAC CATH LAB     INSERT PORT VASCULAR ACCESS Left 4/21/2021    Procedure: INSERTION, VASCULAR ACCESS PORT (NOT SURE ON SIDE UNTIL REMOVAL);  Surgeon: Rajan More MD;  Location: [facility] OR     IR CHEST PORT PLACEMENT > 5 YRS OF AGE  4/21/2021     IR CVC NON TUNNEL LINE REMOVAL  5/6/2021     IR CVC NON TUNNEL PLACEMENT > 5 YRS   04/07/2020     IR CVC NON TUNNEL PLACEMENT > 5 YRS  4/30/2021     IR CVC NON TUNNEL PLACEMENT > 5 YRS  9/7/2022     IR PORT REMOVAL LEFT  4/21/2021     REMOVE PORT VASCULAR ACCESS Left 4/21/2021    Procedure: REMOVAL, VASCULAR ACCESS PORT LEFT;  Surgeon: Rajan More MD;  Location: [facility] OR     REPAIR TENDON ELBOW Right 10/02/2019    Procedure: Right Elbow Flexor Lengthening, Flexor Pronator Slide Of Wrist and Finger, Thumb Adductor Lengthening;  Surgeon: Anai Franco MD;  Location: [facility] OR     TONSILLECTOMY Bilateral 10/02/2019    Procedure: Bilateral Tonsillectomy;  Surgeon: Farhana Guy MD;  Location: [facility] OR     ZZC BREAST REDUCTION (INCLUDES LIPO) TIER 3 Bilateral 04/23/2019       Prior to Admission Medications   Prior to Admission Medications   Prescriptions Last Dose Informant Patient Reported? Taking?   EPINEPHrine (ANY BX GENERIC EQUIV) 0.3 MG/0.3ML injection 2-pack  Self No No   Sig: Inject 0.3 mLs (0.3 mg) into the muscle as needed for anaphylaxis   Patient not taking: Reported on 3/10/2023   FLUoxetine (PROZAC) 10 MG capsule  Self No No   Sig: Take 1 capsule (10 mg) by mouth daily For two weeks, then increase to 20 mg if 10 mg not effective   Hydroxyurea 1000 MG TABS   No No   Sig: Take 3,000 mg by mouth daily   acetaminophen (TYLENOL) 325 MG tablet  Self No No   Sig: Take 2 tablets (650 mg) by mouth every 6 hours as needed for mild pain   albuterol (PROAIR HFA/PROVENTIL HFA/VENTOLIN HFA) 108 (90 Base) MCG/ACT inhaler   No No   Sig: Inhale 2 puffs into the lungs every 6 hours as needed for shortness of breath or wheezing   albuterol (PROVENTIL) (2.5 MG/3ML) 0.083% neb solution   No No   Sig: Take 2 vials (5 mg) by nebulization every 6 hours as needed for shortness of breath or wheezing   aspirin (ASA) 81 MG chewable tablet   No No   Sig: Take 1 tablet (81 mg) by mouth 2 times daily   budesonide-formoterol (SYMBICORT) 160-4.5 MCG/ACT Inhaler   No No   Sig: Inhale 2 puffs into the  lungs 2 times daily   deferasirox (JADENU) 360 MG tablet   No No   Sig: Take 4 tablets (1,440 mg) by mouth every evening   diclofenac (VOLTAREN) 1 % topical gel   No No   Sig: Apply 4 g topically 4 times daily   diphenhydrAMINE (BENADRYL) 25 MG capsule   No No   Sig: Take 1 capsule (25 mg) by mouth every 6 hours as needed for itching or allergies   folic acid (FOLVITE) 1 MG tablet   No No   Sig: Take 1 tablet (1 mg) by mouth daily   naloxone (NARCAN) 4 MG/0.1ML nasal spray   Yes No   Sig: Spray 4 mg into one nostril alternating nostrils as needed for opioid reversal every 2-3 minutes until assistance arrives   Patient not taking: Reported on 4/17/2023   ondansetron (ZOFRAN) 8 MG tablet  Self No No   Sig: Take 1 tablet (8 mg) by mouth every 8 hours as needed   oxyCODONE IR (ROXICODONE) 15 MG tablet   No No   Sig: Take 1 tablet (15 mg) by mouth every 4 hours as needed for severe pain Goal 4 per day. Max 6 per day.   traZODone (DESYREL) 50 MG tablet  Self No No   Sig: Take 1 tablet (50 mg) by mouth At Bedtime      Facility-Administered Medications Last Administration Doses Remaining   medroxyPROGESTERone (DEPO-PROVERA) injection 150 mg 3/20/2023  9:27 AM 1           Review of Systems    The 10 point Review of Systems is negative other than noted in the HPI or here.      Physical Exam   Vital Signs: Temp: 98.2  F (36.8  C) Temp src: Oral BP: 111/62 Pulse: 103   Resp: 19 SpO2: 99 % O2 Device: Nasal cannula Oxygen Delivery: 2 LPM  Weight: 160 lbs 0 oz    General Appearance: NAD, AOx3  Respiratory: Faint wheezing present b/l, no crackles or ronchi  Cardiovascular: RRR, tachycardic, no gallops or murmurs, s1/s2  GI: non distended, soft, non tender and no guarding  Skin: No rash or jaundice  Other: Speech is intact, no facial drooping    Medical Decision Making     Please see A&P for additional details of medical decision making.      Data     I have personally reviewed the following data over the past 24 hrs:    18.2 (H)  \    6.4 (LL)   / 546 (H)     135 (L) 104 5.5 (L) /  78   3.7 19 (L) 0.58 \       ALT: 14 AST: 33 AP: 76 TBILI: 3.2 (H)   ALB: 4.4 TOT PROTEIN: 7.4 LIPASE: N/A       Trop: <6 BNP: 53       Procal: 0.31 (H) CRP: 17.10 (H) Lactic Acid: 0.8       INR:  1.32 (H) PTT:  N/A   D-dimer:  2.22 (H) Fibrinogen:  N/A       Ferritin:  N/A % Retic:  24.2 (H) LDH:  N/A

## 2023-05-11 NOTE — CONSULTS
Hematology Consult Note   Date of Service: 05/11/2023    Patient: Jennifer Cervantes  MRN: [id number]  Admission Date: 5/10/2023  Hospital Day # Hospital Day: 2  Primary Outpatient Hematologist: Jose Rafael Gomez CNP     Reason for Consult: Vaso-occlusive pain crisis    Assessment & Plan:   Jennifer Cervantes is a 23 year old female with past medical history of asthma, SCD, HbSS that presents with vaso-occlusive pain crisis.     Acute hypoxemic respiratory insufficiency   Sickle cell disease HbSS c/b Vaso-occlusive pain crisis   History of Acute chest syndrome 9/22   Iron overload 2/2 repeated transfusions   Hx of CVA previously managed with chronic transfusions  Patient presents with a few days of SOB worsened with ambulation, pleuritic chest pain, lower back pain. Hgb on arrival 6.4 with a baseline of about 7 - 8, retic count elevated at 24.2 %, leukocytosis with a WBC 18.2. She endorses pain in her lower back, there was concern for acute PE given chest pain, hypoxemia and ORTEGA, CT chest w/o contrast was obtained that showed mild interstitial edema on the right, US of BLE did not demonstrate a clot. She presented tachycardic to 112 but afebrile. CXR did not show any acute infiltrates. D-dimer is elevated at 2.22 but lower than its been in the past given its an acute phase reactant, procal is borderline at 0.31. She received dilaudid, ketamine and IVF for pain control, as well as steroids and albuterol for possible asthma exacerbation.  Compared to labs obtained 5/8, labs from 5/10 shows a decrease in all 3 cell lines, could be hemodilution 2/2 fluids received 5/9 at infusion center. Total bili is elevated at 1.8. Per chart review, it appears prior VOC present with pleuritic chest pain, dyspnea and due to prior VTE.     Recommendations:   - Continue Pain control per care plan   - Do NOT transfuse blood unless discussed with Hematology first. Do NOT transfuse based solely off hemoglobin level given her history of iron  overload.  - Hold therapeutic anticoagulation, await NM scan results.   - Wean oxygen as able.   - Continue to manage asthma exacerbation.   - Continue PTA hydrea, Folic acid.    Patient was seen and plan of care was discussed with attending physician Dr. Cogan.    We will continue to follow this patient. Please don't hesitate to contact the Fellow On-Call with questions.    Alberto García M.D (Abdul)  PGY-2 Internal Medicine   [facility]  Hematology/Oncology Service.      History of Present Illness:    Jennifer Cervantes is a 23 year old female that presents with about 6 - 7 days of shortness of breath worsened by ambulation, wheezing and LBP.   5/8 At infusion center, complained of SOB.   5/9 she called triage due to dyspnea on exertion and arrived for IVF and pain meds. She endorsed SOB that worsened with movement and received fluids and IV dilaudid. She was noted to be hypoxic with sats < 90 while laying down and was placed on 2L o2 with improvement in sat's her baseline of of about 92%.   5/10: she presented to the Ed for worsening SOB and chest pain as her dyspnea worsened after going home. She tried home inhalers/nebs at home without relief.   Denies fevers, chills, endorses SOB only on deep inspiration,       Hematologic History:  Hx of VTE 2021  Hx of CVA   Hx of Iron Overload   Hx of acute chest 9/22        Review of Systems: Pertinent positive and negative systems described in HPI; the remainder of the 14 systems are negative    Past Medical History:  Past Medical History:   Diagnosis Date     Anxiety      Bleeding disorder (H)      Blood clotting disorder (H)      Cerebral infarction (H) 2015     Cognitive developmental delay     low IQ. Please recognize when managing pain and planning with her     Depressive disorder      Hemiplegia and hemiparesis following cerebral infarction affecting right dominant side (H)     right hand contractures     Iron overload due to repeated red blood cell  transfusions      Migraines      Multiple subsegmental pulmonary emboli without acute cor pulmonale (H) 02/01/2021     Oppositional defiant behavior      Presence of intrauterine contraceptive device 2/18/2020     Superficial venous thrombosis of arm, right 03/25/2021     Uncomplicated asthma        Past Surgical History:  Past Surgical History:   Procedure Laterality Date     AS INSERT TUNNELED CV 2 CATH W/O PORT/PUMP       CHOLECYSTECTOMY       CV RIGHT HEART CATH MEASUREMENTS RECORDED N/A 11/18/2021    Procedure: Right Heart Cath;  Surgeon: Jackson Stauffer MD;  Location:  HEART CARDIAC CATH LAB     INSERT PORT VASCULAR ACCESS Left 4/21/2021    Procedure: INSERTION, VASCULAR ACCESS PORT (NOT SURE ON SIDE UNTIL REMOVAL);  Surgeon: Rajan More MD;  Location: [facility] OR     IR CHEST PORT PLACEMENT > 5 YRS OF AGE  4/21/2021     IR CVC NON TUNNEL LINE REMOVAL  5/6/2021     IR CVC NON TUNNEL PLACEMENT > 5 YRS  04/07/2020     IR CVC NON TUNNEL PLACEMENT > 5 YRS  4/30/2021     IR CVC NON TUNNEL PLACEMENT > 5 YRS  9/7/2022     IR PORT REMOVAL LEFT  4/21/2021     REMOVE PORT VASCULAR ACCESS Left 4/21/2021    Procedure: REMOVAL, VASCULAR ACCESS PORT LEFT;  Surgeon: Rajan More MD;  Location: [facility] OR     REPAIR TENDON ELBOW Right 10/02/2019    Procedure: Right Elbow Flexor Lengthening, Flexor Pronator Slide Of Wrist and Finger, Thumb Adductor Lengthening;  Surgeon: Anai Franco MD;  [facility]: [facility]     TONSILLECTOMY Bilateral 10/02/2019    Procedure: Bilateral Tonsillectomy;  Surgeon: Farhana Guy MD;  Location: [facility] OR     ZZC BREAST REDUCTION (INCLUDES LIPO) TIER 3 Bilateral 04/23/2019       Social History:  Social History     Socioeconomic History     Marital status: Single     Spouse name: None     Number of children: None     Years of education: None     Highest education level: None   Tobacco Use     Smoking status: Never     Smokeless tobacco: Never   Substance and Sexual Activity      "Alcohol use: Not Currently     Alcohol/week: 0.0 standard drinks of alcohol     Drug use: Never     Sexual activity: Not Currently     Partners: Male     Birth control/protection: Other   Social History Narrative    Lives with mom and extended family (mom is her PCA and ILS worker) but \"toxic environment\" per her report. As of 9/28/2022 she is planning to move out at some point soon. She has minimal support at home despite her significant SCD comorbidities and cognitive delay from stroke.     Social Determinants of Health     Intimate Partner Violence: Not At Risk (3/10/2023)    Humiliation, Afraid, Rape, and Kick questionnaire      Fear of Current or Ex-Partner: No      Emotionally Abused: No      Physically Abused: No      Sexually Abused: No        Family History  Family History   Problem Relation Age of Onset     Sickle Cell Trait Mother      Hypertension Mother      Asthma Mother      Sickle Cell Trait Father      Glaucoma No family hx of      Macular Degeneration No family hx of      Diabetes No family hx of      Gout No family hx of        Outpatient Medications:  [COMPLETED] HYDROmorphone (DILAUDID) injection 1 mg  [COMPLETED] lactated ringers BOLUS 1,000 mL  medroxyPROGESTERone (DEPO-PROVERA) injection 150 mg    acetaminophen (TYLENOL) 325 MG tablet, Take 2 tablets (650 mg) by mouth every 6 hours as needed for mild pain  albuterol (PROAIR HFA/PROVENTIL HFA/VENTOLIN HFA) 108 (90 Base) MCG/ACT inhaler, Inhale 2 puffs into the lungs every 6 hours as needed for shortness of breath or wheezing  albuterol (PROVENTIL) (2.5 MG/3ML) 0.083% neb solution, Take 2 vials (5 mg) by nebulization every 6 hours as needed for shortness of breath or wheezing  aspirin (ASA) 81 MG chewable tablet, Take 1 tablet (81 mg) by mouth 2 times daily  budesonide-formoterol (SYMBICORT) 160-4.5 MCG/ACT Inhaler, Inhale 2 puffs into the lungs 2 times daily  deferasirox (JADENU) 360 MG tablet, Take 4 tablets (1,440 mg) by mouth every " "evening  diclofenac (VOLTAREN) 1 % topical gel, Apply 4 g topically 4 times daily  diphenhydrAMINE (BENADRYL) 25 MG capsule, Take 1 capsule (25 mg) by mouth every 6 hours as needed for itching or allergies  EPINEPHrine (ANY BX GENERIC EQUIV) 0.3 MG/0.3ML injection 2-pack, Inject 0.3 mLs (0.3 mg) into the muscle as needed for anaphylaxis (Patient not taking: Reported on 3/10/2023)  FLUoxetine (PROZAC) 10 MG capsule, Take 1 capsule (10 mg) by mouth daily For two weeks, then increase to 20 mg if 10 mg not effective  folic acid (FOLVITE) 1 MG tablet, Take 1 tablet (1 mg) by mouth daily  Hydroxyurea 1000 MG TABS, Take 3,000 mg by mouth daily  naloxone (NARCAN) 4 MG/0.1ML nasal spray, Spray 4 mg into one nostril alternating nostrils as needed for opioid reversal every 2-3 minutes until assistance arrives (Patient not taking: Reported on 4/17/2023)  ondansetron (ZOFRAN) 8 MG tablet, Take 1 tablet (8 mg) by mouth every 8 hours as needed  oxyCODONE IR (ROXICODONE) 15 MG tablet, Take 1 tablet (15 mg) by mouth every 4 hours as needed for severe pain Goal 4 per day. Max 6 per day.  traZODone (DESYREL) 50 MG tablet, Take 1 tablet (50 mg) by mouth At Bedtime         Physical Exam:    /64 (BP Location: Left arm)   Pulse 84   Temp 97.6  F (36.4  C) (Pulmonary Artery)   Resp 15   Ht 1.626 m (5' 4\")   Wt 72.6 kg (160 lb)   SpO2 96%   BMI 27.46 kg/m    Gen: laying in bed, in slight distress due to pain   CVS; regular rate and rhythm, no mr/r/g  Pulmonary; Crackles appreciated on the right side, slight wheezing. On 2 L o2, spo2 100%  Abdomen; soft, non tender, non distended  Ext: no lower extremity edema  Neuro: unable to access.   Psych: normal affect, alert and oriented x 4.     Labs & Studies: I personally reviewed the following studies:  ROUTINE LABS (Last four results):  CMP  Recent Labs   Lab 05/11/23  0648 05/10/23  1948 05/08/23  1337 05/05/23  0854    135* 136 139   POTASSIUM 4.3 3.7 4.1 3.4   CHLORIDE 109* " 104 104 108*   CO2 19* 19* 21* 21*   ANIONGAP 11 12 11 10   * 78 87 90   BUN 6.3 5.5* 7.5 4.3*   CR 0.53 0.58 0.53 0.47*   GFRESTIMATED >90 >90 >90 >90   MICAH 8.9 8.9 9.6 9.2   PROTTOTAL 7.2 7.4 8.4* 7.4   ALBUMIN 4.2 4.4 4.7 4.3   BILITOTAL 1.8* 3.2* 3.1* 2.4*   ALKPHOS 71 76 82 67   AST 27 33 26 31   ALT 12 14 16 20     CBC  Recent Labs   Lab 05/11/23  0648 05/10/23  1948 05/08/23  1337 05/05/23  0854   WBC 14.8* 18.2* 21.3* 14.9*   RBC 2.24* 2.26* 2.61* 2.30*   HGB 6.4* 6.4* 7.6* 6.6*   HCT 19.6* 19.7* 22.4* 20.0*   MCV 88 87 86 87   MCH 28.6 28.3 29.1 28.7   MCHC 32.7 32.5 33.9 33.0   RDW 27.1* 27.2* 24.4* 24.9*   * 546* 649* 577*     INR  Recent Labs   Lab 05/10/23  1948   INR 1.32*

## 2023-05-11 NOTE — PROVIDER NOTIFICATION
Paged [facility] 8:    Pt reporting inadequate pain control, currently 9/10, refused VQ scan. Please address.

## 2023-05-11 NOTE — PROGRESS NOTES
Paged [facility] team  CT wants pregnancy test before they perform scan.     MD ordered test, urine collection pending.

## 2023-05-11 NOTE — H&P
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  Presented to the ED with T 98.2, P 99, RR 22, /78 and saturating 93% on 2L oxy via NC. Presented with c/o SOB, pain on inspiration and lower back pain. Sx not relieved with home nebs. The electrolytes were within the normal limits and the lea is 3.2. Procal is raised at 0.31. Trop is <6. WBC count raised at 18.2 with Hb at 6.4 and plt 546. D dimer is 2.22. Given the hx of contrast allergy would get V/Q scan. CXR showed trace pleural effusion unchanged from prior exam with no acute consolidations or airspace disease. Currently the acute pain crisis seems to be 2/2 asthma exacerbation vs PNA vs PE. Given the concern for possible infx will get a CT chest without contrast and manage accordingly. Will start on lovenox subcutaneous 1 mg/kg given the concern for possible PE and reassess after the V/Q scan.   - Oxycodone increased to 20 mg Q4 hrly PRN   - Ketamine 4mg/hr drip   - Albuterol as needed for SOB  - Cont PTA inhalers  - Cont PTA hydroxyurea  - Was given 2x Ketamin 4mg in the ED  - Was given 2x Oxycodone 15 mg in ED  - Given 1 dose of solumedrol in ED  - Cont 40 mg of Prednisone   - LR infusion was changed NS infusion given the concern with ketamine incompatibility with LR  - cont PTA deferasirox  - Will hold off on transfusion and will readdress with hematology  - CT chest w/o contrast  - V/Q scan in AM  - Repeat labs in AM  -  "Resp culture, MRSA nares, resp panel     #Hx of depression  - cont pta fluoxetine  - Holding PTA trazodone given pt on ketamine infusion           Diet: Combination Diet Regular Diet Adult    DVT Prophylaxis: Enoxaparin (Lovenox) SQ  Lopez Catheter: Not present  Fluids: NS infusion  Lines:   Port A Cath Single 04/21/21 Left Chest wall-Site Assessment: WDL      Cardiac Monitoring: None  Code Status: Full Code          Clinically Significant Risk Factors Present on Admission                # Coagulation Defect: INR = 1.32 (Ref range: 0.85 - 1.15) and/or PTT = N/A, will monitor for bleeding      # Acute Respiratory Failure: Documented O2 saturation < 91%.  Continue supplemental oxygen as needed     # Overweight: Estimated body mass index is 27.46 kg/m  as calculated from the following:    Height as of this encounter: 1.626 m (5' 4\").    Weight as of this encounter: 72.6 kg (160 lb).                  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      Reports that was in the infusion center in the AM for ORTEGA and pain. Reports that she did feel better after leaving the infusion center but subsequently her sxs worsened and she presented to the ED with ongoing pain in the lower back as well as increasing shortness of breath. She has been using home nebulizers without relief. She has a history of " sickle cell disease with history of stroke and multiple thromboembolic complications. She is having no fever, chills, URI symptoms, cough, nausea, vomiting or abdominal pain. She has no leg pain or swelling. She has had no known ill exposures.              Past Medical History        Past Medical History:   Diagnosis Date     Anxiety       Bleeding disorder (H)       Blood clotting disorder (H)       Cerebral infarction (H) 2015     Cognitive developmental delay       low IQ. Please recognize when managing pain and planning with her     Depressive disorder       Hemiplegia and hemiparesis following cerebral infarction affecting right dominant side (H)       right hand contractures     Iron overload due to repeated red blood cell transfusions       Migraines       Multiple subsegmental pulmonary emboli without acute cor pulmonale (H) 02/01/2021     Oppositional defiant behavior       Presence of intrauterine contraceptive device 2/18/2020     Superficial venous thrombosis of arm, right 03/25/2021     Uncomplicated asthma                 Past Surgical History         Past Surgical History:   Procedure Laterality Date     AS INSERT TUNNELED CV 2 CATH W/O PORT/PUMP         CHOLECYSTECTOMY         CV RIGHT HEART CATH MEASUREMENTS RECORDED N/A 11/18/2021     Procedure: Right Heart Cath;  Surgeon: Jackson Stauffer MD;  Location:  HEART CARDIAC CATH LAB     INSERT PORT VASCULAR ACCESS Left 4/21/2021     Procedure: INSERTION, VASCULAR ACCESS PORT (NOT SURE ON SIDE UNTIL REMOVAL);  Surgeon: Rajan More MD;  Location: [facility] OR     IR CHEST PORT PLACEMENT > 5 YRS OF AGE   4/21/2021     IR CVC NON TUNNEL LINE REMOVAL   5/6/2021     IR CVC NON TUNNEL PLACEMENT > 5 YRS   04/07/2020     IR CVC NON TUNNEL PLACEMENT > 5 YRS   4/30/2021     IR CVC NON TUNNEL PLACEMENT > 5 YRS   9/7/2022     IR PORT REMOVAL LEFT   4/21/2021     REMOVE PORT VASCULAR ACCESS Left 4/21/2021     Procedure: REMOVAL, VASCULAR ACCESS PORT LEFT;  Surgeon:  Rajan More MD;  Location: [facility] OR     REPAIR TENDON ELBOW Right 10/02/2019     Procedure: Right Elbow Flexor Lengthening, Flexor Pronator Slide Of Wrist and Finger, Thumb Adductor Lengthening;  Surgeon: Anai Franco MD;  Location: [facility] OR     TONSILLECTOMY Bilateral 10/02/2019     Procedure: Bilateral Tonsillectomy;  Surgeon: Farhana Guy MD;  Location: [facility] OR     ZZC BREAST REDUCTION (INCLUDES LIPO) TIER 3 Bilateral 04/23/2019               Prior to Admission Medications   Prior to Admission Medications   Prescriptions Last Dose Informant Patient Reported? Taking?   EPINEPHrine (ANY BX GENERIC EQUIV) 0.3 MG/0.3ML injection 2-pack   Self No No   Sig: Inject 0.3 mLs (0.3 mg) into the muscle as needed for anaphylaxis   Patient not taking: Reported on 3/10/2023   FLUoxetine (PROZAC) 10 MG capsule   Self No No   Sig: Take 1 capsule (10 mg) by mouth daily For two weeks, then increase to 20 mg if 10 mg not effective   Hydroxyurea 1000 MG TABS     No No   Sig: Take 3,000 mg by mouth daily   acetaminophen (TYLENOL) 325 MG tablet   Self No No   Sig: Take 2 tablets (650 mg) by mouth every 6 hours as needed for mild pain   albuterol (PROAIR HFA/PROVENTIL HFA/VENTOLIN HFA) 108 (90 Base) MCG/ACT inhaler     No No   Sig: Inhale 2 puffs into the lungs every 6 hours as needed for shortness of breath or wheezing   albuterol (PROVENTIL) (2.5 MG/3ML) 0.083% neb solution     No No   Sig: Take 2 vials (5 mg) by nebulization every 6 hours as needed for shortness of breath or wheezing   aspirin (ASA) 81 MG chewable tablet     No No   Sig: Take 1 tablet (81 mg) by mouth 2 times daily   budesonide-formoterol (SYMBICORT) 160-4.5 MCG/ACT Inhaler     No No   Sig: Inhale 2 puffs into the lungs 2 times daily   deferasirox (JADENU) 360 MG tablet     No No   Sig: Take 4 tablets (1,440 mg) by mouth every evening   diclofenac (VOLTAREN) 1 % topical gel     No No   Sig: Apply 4 g topically 4 times daily   diphenhydrAMINE  (BENADRYL) 25 MG capsule     No No   Sig: Take 1 capsule (25 mg) by mouth every 6 hours as needed for itching or allergies   folic acid (FOLVITE) 1 MG tablet     No No   Sig: Take 1 tablet (1 mg) by mouth daily   naloxone (NARCAN) 4 MG/0.1ML nasal spray     Yes No   Sig: Spray 4 mg into one nostril alternating nostrils as needed for opioid reversal every 2-3 minutes until assistance arrives   Patient not taking: Reported on 4/17/2023   ondansetron (ZOFRAN) 8 MG tablet   Self No No   Sig: Take 1 tablet (8 mg) by mouth every 8 hours as needed   oxyCODONE IR (ROXICODONE) 15 MG tablet     No No   Sig: Take 1 tablet (15 mg) by mouth every 4 hours as needed for severe pain Goal 4 per day. Max 6 per day.   traZODone (DESYREL) 50 MG tablet   Self No No   Sig: Take 1 tablet (50 mg) by mouth At Bedtime      Facility-Administered Medications Last Administration Doses Remaining   medroxyPROGESTERone (DEPO-PROVERA) injection 150 mg 3/20/2023  9:27 AM 1               Review of Systems   The 10 point Review of Systems is negative other than noted in the HPI or here.             Physical Exam  Vital Signs: Temp: 98.2  F (36.8  C) Temp src: Oral BP: 111/62 Pulse: 103   Resp: 19 SpO2: 99 % O2 Device: Nasal cannula Oxygen Delivery: 2 LPM  Weight: 160 lbs 0 oz     General Appearance:  NAD, AOx3  Respiratory: Faint wheezing present b/l, no crackles or ronchi  Cardiovascular: RRR, tachycardic, no gallops or murmurs, s1/s2  GI: non distended, soft, non tender and no guarding  Skin: No rash or jaundice  Other:  Speech is intact, no facial drooping           Medical Decision Making   Please see A&P for additional details of medical decision making.             Data      I have personally reviewed the following data over the past 24 hrs:     18.2 (H)  \   6.4 (LL)   / 546 (H)      135 (L) 104 5.5 (L) /  78   3.7 19 (L) 0.58 \         ALT: 14 AST: 33 AP: 76 TBILI: 3.2 (H)   ALB: 4.4 TOT PROTEIN: 7.4 LIPASE: N/A         Trop: <6 BNP: 53          Procal: 0.31 (H) CRP: 17.10 (H) Lactic Acid: 0.8         INR:  1.32 (H) PTT:  N/A   D-dimer:  2.22 (H) Fibrinogen:  N/A         Ferritin:  N/A % Retic:  24.2 (H) LDH:  N/A                   Routing History

## 2023-05-12 ENCOUNTER — APPOINTMENT (OUTPATIENT)
Dept: PHYSICAL THERAPY | Facility: CLINIC | Age: 23
DRG: 811 | End: 2023-05-12
Payer: COMMERCIAL

## 2023-05-12 LAB
ALBUMIN SERPL BCG-MCNC: 4 G/DL (ref 3.5–5.2)
ANION GAP SERPL CALCULATED.3IONS-SCNC: 10 MMOL/L (ref 7–15)
BASOPHILS # BLD MANUAL: 0.5 10E3/UL (ref 0–0.2)
BASOPHILS NFR BLD MANUAL: 4 %
BILIRUB DIRECT SERPL-MCNC: 0.38 MG/DL (ref 0–0.3)
BILIRUB SERPL-MCNC: 1.4 MG/DL
BUN SERPL-MCNC: 8.7 MG/DL (ref 6–20)
CALCIUM SERPL-MCNC: 8.3 MG/DL (ref 8.6–10)
CHLORIDE SERPL-SCNC: 111 MMOL/L (ref 98–107)
CREAT SERPL-MCNC: 0.67 MG/DL (ref 0.51–0.95)
DEPRECATED HCO3 PLAS-SCNC: 20 MMOL/L (ref 22–29)
ELLIPTOCYTES BLD QL SMEAR: SLIGHT
EOSINOPHIL # BLD MANUAL: 1.8 10E3/UL (ref 0–0.7)
EOSINOPHIL NFR BLD MANUAL: 15 %
ERYTHROCYTE [DISTWIDTH] IN BLOOD BY AUTOMATED COUNT: 28.1 % (ref 10–15)
GFR SERPL CREATININE-BSD FRML MDRD: >90 ML/MIN/1.73M2
GLUCOSE SERPL-MCNC: 100 MG/DL (ref 70–99)
HCT VFR BLD AUTO: 20 % (ref 35–47)
HGB BLD-MCNC: 6.5 G/DL (ref 11.7–15.7)
LYMPHOCYTES # BLD MANUAL: 2.5 10E3/UL (ref 0.8–5.3)
LYMPHOCYTES NFR BLD MANUAL: 21 %
MCH RBC QN AUTO: 29.3 PG (ref 26.5–33)
MCHC RBC AUTO-ENTMCNC: 32.5 G/DL (ref 31.5–36.5)
MCV RBC AUTO: 90 FL (ref 78–100)
MONOCYTES # BLD MANUAL: 0.4 10E3/UL (ref 0–1.3)
MONOCYTES NFR BLD MANUAL: 3 %
MRSA DNA SPEC QL NAA+PROBE: NEGATIVE
MYELOCYTES # BLD MANUAL: 0.1 10E3/UL
MYELOCYTES NFR BLD MANUAL: 1 %
NEUTROPHILS # BLD MANUAL: 6.7 10E3/UL (ref 1.6–8.3)
NEUTROPHILS NFR BLD MANUAL: 56 %
NRBC # BLD AUTO: 1.6 10E3/UL
NRBC BLD MANUAL-RTO: 13 %
PHOSPHATE SERPL-MCNC: 5 MG/DL (ref 2.5–4.5)
PLAT MORPH BLD: ABNORMAL
PLATELET # BLD AUTO: 556 10E3/UL (ref 150–450)
POLYCHROMASIA BLD QL SMEAR: SLIGHT
POTASSIUM SERPL-SCNC: 4.3 MMOL/L (ref 3.4–5.3)
RBC # BLD AUTO: 2.22 10E6/UL (ref 3.8–5.2)
RBC MORPH BLD: ABNORMAL
RETICS # AUTO: 0.41 10E6/UL (ref 0.03–0.1)
RETICS/RBC NFR AUTO: 18.5 % (ref 0.5–2)
SA TARGET DNA: NEGATIVE
SICKLE CELLS BLD QL SMEAR: SLIGHT
SODIUM SERPL-SCNC: 141 MMOL/L (ref 136–145)
TARGETS BLD QL SMEAR: SLIGHT
WBC # BLD AUTO: 12 10E3/UL (ref 4–11)

## 2023-05-12 PROCEDURE — 250N000013 HC RX MED GY IP 250 OP 250 PS 637: Performed by: INTERNAL MEDICINE

## 2023-05-12 PROCEDURE — 250N000009 HC RX 250: Performed by: STUDENT IN AN ORGANIZED HEALTH CARE EDUCATION/TRAINING PROGRAM

## 2023-05-12 PROCEDURE — 250N000013 HC RX MED GY IP 250 OP 250 PS 637

## 2023-05-12 PROCEDURE — 258N000003 HC RX IP 258 OP 636

## 2023-05-12 PROCEDURE — 87641 MR-STAPH DNA AMP PROBE: CPT | Performed by: INTERNAL MEDICINE

## 2023-05-12 PROCEDURE — 250N000009 HC RX 250: Performed by: INTERNAL MEDICINE

## 2023-05-12 PROCEDURE — 85027 COMPLETE CBC AUTOMATED: CPT | Performed by: STUDENT IN AN ORGANIZED HEALTH CARE EDUCATION/TRAINING PROGRAM

## 2023-05-12 PROCEDURE — 250N000013 HC RX MED GY IP 250 OP 250 PS 637: Performed by: STUDENT IN AN ORGANIZED HEALTH CARE EDUCATION/TRAINING PROGRAM

## 2023-05-12 PROCEDURE — 97530 THERAPEUTIC ACTIVITIES: CPT | Mod: GP

## 2023-05-12 PROCEDURE — 94640 AIRWAY INHALATION TREATMENT: CPT | Mod: 76

## 2023-05-12 PROCEDURE — 120N000002 HC R&B MED SURG/OB UMMC

## 2023-05-12 PROCEDURE — 82248 BILIRUBIN DIRECT: CPT | Performed by: STUDENT IN AN ORGANIZED HEALTH CARE EDUCATION/TRAINING PROGRAM

## 2023-05-12 PROCEDURE — 250N000011 HC RX IP 250 OP 636

## 2023-05-12 PROCEDURE — 80069 RENAL FUNCTION PANEL: CPT | Performed by: STUDENT IN AN ORGANIZED HEALTH CARE EDUCATION/TRAINING PROGRAM

## 2023-05-12 PROCEDURE — 999N000157 HC STATISTIC RCP TIME EA 10 MIN

## 2023-05-12 PROCEDURE — 97161 PT EVAL LOW COMPLEX 20 MIN: CPT | Mod: GP

## 2023-05-12 PROCEDURE — 36415 COLL VENOUS BLD VENIPUNCTURE: CPT | Performed by: STUDENT IN AN ORGANIZED HEALTH CARE EDUCATION/TRAINING PROGRAM

## 2023-05-12 PROCEDURE — 250N000011 HC RX IP 250 OP 636: Performed by: STUDENT IN AN ORGANIZED HEALTH CARE EDUCATION/TRAINING PROGRAM

## 2023-05-12 PROCEDURE — 250N000012 HC RX MED GY IP 250 OP 636 PS 637: Performed by: INTERNAL MEDICINE

## 2023-05-12 PROCEDURE — 97116 GAIT TRAINING THERAPY: CPT | Mod: GP

## 2023-05-12 PROCEDURE — 258N000003 HC RX IP 258 OP 636: Performed by: STUDENT IN AN ORGANIZED HEALTH CARE EDUCATION/TRAINING PROGRAM

## 2023-05-12 PROCEDURE — 94640 AIRWAY INHALATION TREATMENT: CPT

## 2023-05-12 PROCEDURE — 99233 SBSQ HOSP IP/OBS HIGH 50: CPT | Performed by: STUDENT IN AN ORGANIZED HEALTH CARE EDUCATION/TRAINING PROGRAM

## 2023-05-12 PROCEDURE — 85007 BL SMEAR W/DIFF WBC COUNT: CPT | Performed by: STUDENT IN AN ORGANIZED HEALTH CARE EDUCATION/TRAINING PROGRAM

## 2023-05-12 PROCEDURE — 85045 AUTOMATED RETICULOCYTE COUNT: CPT | Performed by: STUDENT IN AN ORGANIZED HEALTH CARE EDUCATION/TRAINING PROGRAM

## 2023-05-12 RX ORDER — ALBUTEROL SULFATE 0.83 MG/ML
5 SOLUTION RESPIRATORY (INHALATION)
Status: DISCONTINUED | OUTPATIENT
Start: 2023-05-12 | End: 2023-05-13

## 2023-05-12 RX ORDER — MAGNESIUM SULFATE HEPTAHYDRATE 40 MG/ML
2 INJECTION, SOLUTION INTRAVENOUS ONCE
Status: COMPLETED | OUTPATIENT
Start: 2023-05-12 | End: 2023-05-12

## 2023-05-12 RX ADMIN — OXYCODONE HYDROCHLORIDE 20 MG: 10 TABLET ORAL at 17:53

## 2023-05-12 RX ADMIN — ALBUTEROL SULFATE 5 MG: 2.5 SOLUTION RESPIRATORY (INHALATION) at 07:46

## 2023-05-12 RX ADMIN — PANTOPRAZOLE SODIUM 40 MG: 40 TABLET, DELAYED RELEASE ORAL at 10:17

## 2023-05-12 RX ADMIN — ALBUTEROL SULFATE 5 MG: 2.5 SOLUTION RESPIRATORY (INHALATION) at 20:04

## 2023-05-12 RX ADMIN — KETAMINE HYDROCHLORIDE 6 MG/HR: 10 INJECTION, SOLUTION INTRAMUSCULAR; INTRAVENOUS at 12:36

## 2023-05-12 RX ADMIN — ACETAMINOPHEN 975 MG: 325 TABLET ORAL at 10:16

## 2023-05-12 RX ADMIN — ALBUTEROL SULFATE 5 MG: 2.5 SOLUTION RESPIRATORY (INHALATION) at 11:44

## 2023-05-12 RX ADMIN — KETOROLAC TROMETHAMINE 30 MG: 30 INJECTION, SOLUTION INTRAMUSCULAR; INTRAVENOUS at 12:41

## 2023-05-12 RX ADMIN — FLUOXETINE HYDROCHLORIDE 10 MG: 10 CAPSULE ORAL at 10:17

## 2023-05-12 RX ADMIN — SODIUM CHLORIDE: 9 INJECTION, SOLUTION INTRAVENOUS at 15:38

## 2023-05-12 RX ADMIN — OXYCODONE HYDROCHLORIDE 20 MG: 10 TABLET ORAL at 02:19

## 2023-05-12 RX ADMIN — OXYCODONE HYDROCHLORIDE 20 MG: 10 TABLET ORAL at 06:02

## 2023-05-12 RX ADMIN — ALBUTEROL SULFATE 5 MG: 2.5 SOLUTION RESPIRATORY (INHALATION) at 02:30

## 2023-05-12 RX ADMIN — HYDROXYUREA 3000 MG: 500 CAPSULE ORAL at 10:18

## 2023-05-12 RX ADMIN — ALBUTEROL SULFATE 5 MG: 2.5 SOLUTION RESPIRATORY (INHALATION) at 18:07

## 2023-05-12 RX ADMIN — SODIUM CHLORIDE: 9 INJECTION, SOLUTION INTRAVENOUS at 02:25

## 2023-05-12 RX ADMIN — HYDROMORPHONE HYDROCHLORIDE 1 MG: 1 INJECTION, SOLUTION INTRAMUSCULAR; INTRAVENOUS; SUBCUTANEOUS at 11:56

## 2023-05-12 RX ADMIN — MAGNESIUM SULFATE IN WATER 2 G: 40 INJECTION, SOLUTION INTRAVENOUS at 12:01

## 2023-05-12 RX ADMIN — OXYCODONE HYDROCHLORIDE 20 MG: 10 TABLET ORAL at 10:15

## 2023-05-12 RX ADMIN — ONDANSETRON 4 MG: 2 INJECTION INTRAMUSCULAR; INTRAVENOUS at 20:39

## 2023-05-12 RX ADMIN — KETOROLAC TROMETHAMINE 30 MG: 30 INJECTION, SOLUTION INTRAMUSCULAR; INTRAVENOUS at 06:02

## 2023-05-12 RX ADMIN — ALBUTEROL SULFATE 5 MG: 2.5 SOLUTION RESPIRATORY (INHALATION) at 15:46

## 2023-05-12 RX ADMIN — OXYCODONE HYDROCHLORIDE 20 MG: 10 TABLET ORAL at 21:39

## 2023-05-12 RX ADMIN — ACETAMINOPHEN 975 MG: 325 TABLET ORAL at 20:34

## 2023-05-12 RX ADMIN — OXYCODONE HYDROCHLORIDE 20 MG: 10 TABLET ORAL at 14:28

## 2023-05-12 RX ADMIN — KETOROLAC TROMETHAMINE 30 MG: 30 INJECTION, SOLUTION INTRAMUSCULAR; INTRAVENOUS at 23:59

## 2023-05-12 RX ADMIN — ACETAMINOPHEN 975 MG: 325 TABLET ORAL at 14:28

## 2023-05-12 RX ADMIN — PREDNISONE 40 MG: 20 TABLET ORAL at 10:17

## 2023-05-12 RX ADMIN — KETOROLAC TROMETHAMINE 30 MG: 30 INJECTION, SOLUTION INTRAMUSCULAR; INTRAVENOUS at 18:32

## 2023-05-12 RX ADMIN — ENOXAPARIN SODIUM 40 MG: 40 INJECTION SUBCUTANEOUS at 12:45

## 2023-05-12 ASSESSMENT — ACTIVITIES OF DAILY LIVING (ADL)
ADLS_ACUITY_SCORE: 35

## 2023-05-12 NOTE — PROGRESS NOTES
"ED PT Eval     05/12/23 1400   Appointment Info   Signing Clinician's Name / Credentials (PT) ADRIAN Castellanos   Student Supervision Direct supervision provided   Living Environment   People in Home sibling(s);parent(s)   Current Living Arrangements house   Home Accessibility stairs to enter home;stairs within home   Number of Stairs, Main Entrance 3   Stair Railings, Main Entrance railings safe and in good condition   Number of Stairs, Within Home, Primary greater than 10 stairs   Stair Railings, Within Home, Primary railings safe and in good condition   Living Environment Comments Pt lives in a house with her mom and siblings. There are couple stairs to enter the home and there are more than 15 stairs within the home both with railings in good conditions.   Self-Care   Usual Activity Tolerance moderate   Current Activity Tolerance moderate   Equipment Currently Used at Home none   Fall history within last six months no   Activity/Exercise/Self-Care Comment Pt reports being independent for all ADLs. Her and her siblings all help with household activities. Pt reports being independent with stairs, and occasionally will use the railing if tired.   General Information   Onset of Illness/Injury or Date of Surgery 05/10/23   Referring Physician Jason Barnes MD   Patient/Family Therapy Goals Statement (PT) To return home   Pertinent History of Current Problem (include personal factors and/or comorbidities that impact the POC) Per EMR \"Jennifer Cervantes is a 23 year old female who presents with 1 week of progressive dyspnea, chest pain on inspiration, wheezing, dyspnea on exertion and lower back pain. She has been using home nebulizers without relief. She was in the infusion center this afternoon and was treated with fluids and pain medications, however her dyspnea worsened after going home. She called her doctor and was advised to come in to the ED. She has a history of sickle cell disease with history of " "stroke and multiple thromboembolic complications. She is having no fever, chills, URI symptoms, cough, nausea, vomiting or abdominal pain. She has no leg pain or swelling. She has had no known ill exposures.\"   General Observations Activity order: up ad janett   Cognition   Affect/Mental Status (Cognition) WFL;low arousal/lethargic   Follows Commands (Cognition) WFL   Cognitive Status Comments Pt was lethargic during eval but able to hold appropriate conversation. Not very talkative.   Pain Assessment   Patient Currently in Pain Yes, see Vital Sign flowsheet   Range of Motion (ROM)   ROM Comment Decreased RUE ROM, spastic RUE   Strength (Manual Muscle Testing)   Strength (Manual Muscle Testing) MMT: Hip;MMT: Knee   Left Hip (Manual Muscle Testing)   Hip Flexion - Left Side (5/5) normal,left   Right Hip (Manual Muscle Testing)   Hip Flexion - Right Side (5/5) normal,right   Left Knee (Manual Muscle Testing)   Knee Extension - Left Side (5/5) normal,left   Right Knee (Manual Muscle Testing)   Knee Extension - Right Side (5/5) normal,right   Bed Mobility   Comment, (Bed Mobility) Independent   Transfers   Comment, (Transfers) Independent with sit <> stand   Gait/Stairs (Locomotion)   [place] Level (Gait) independent;contact guard   Balance   Balance Comments Deficits with dynamic balance   Sensory Examination   Sensory Perception patient reports no sensory changes   Clinical Impression   Criteria for Skilled Therapeutic Intervention Yes, treatment indicated   PT Diagnosis (PT) Impaired functional mobility   Influenced by the following impairments weakness, decreased ROM, balance deficits, decreased activity tolerance   Functional limitations due to impairments gait, balance, endurance   Clinical Presentation (PT Evaluation Complexity) Stable/Uncomplicated   Clinical Presentation Rationale per clinical judgement   Clinical Decision Making (Complexity) low complexity   Planned Therapy Interventions (PT) balance " training;gait training;home exercise program;neuromuscular re-education;strengthening;patient/family education   Risk & Benefits of therapy have been explained evaluation/treatment results reviewed;care plan/treatment goals reviewed;risks/benefits reviewed;current/potential barriers reviewed;participants voiced agreement with care plan;participants included;patient   PT Total Evaluation Time   PT Eval, Low Complexity Minutes (31677) 10   Physical Therapy Goals   PT Frequency 4x/week   PT Predicted Duration/Target Date for Goal Attainment 05/26/23   PT Goals Gait;Stairs;Aerobic Activity   PT: Gait Independent;Greater than 200 feet;Goal Met   PT: Stairs Modified independent;Rail on both sides;Greater than 10 stairs   PT: Perform aerobic activity with stable cardiovascular response continuous activity;15 minutes;ambulation   PT Discharge Planning   PT Plan R side strengthening, gait training, trial stairs, dynamic balance training   PT Discharge Recommendation (DC Rec) home with outpatient physical therapy   PT Rationale for DC Rec Pt is functioning near her baseline for functional mobility. Pt is independent at baseline, with minor R side weakness. Anticipating pt will be safe to discharge home once medically ready. Recommending OP PT to improve functional mobility.   PT Brief overview of current status Supervision/ CGA with gait   Total Session Time   Total Session Time (sum of timed and untimed services) 10

## 2023-05-12 NOTE — PROGRESS NOTES
[id number]  Status: came to ED on 5/10 with sickle cell pain crisis  Vitals: sating 90s on 2L NC other vss  Neuros: A&Ox4. Sleeping on and off throughout shift.   IV: portacath infusing NS@75ml/hr and ketamine gtt 6mg/hr  Resp: wheezy. Scheduled nebs  Diet: regular diet. Requesting multiple peanutbutter and grape jelly sandwiches throughout shift  Pain: ketamine gtt. Scheduled oxycodone and toradol. Pain 7/10 throughout shift  Activity: SBA to commode  Plan: pain control

## 2023-05-13 LAB
ALBUMIN SERPL BCG-MCNC: 3.8 G/DL (ref 3.5–5.2)
ANION GAP SERPL CALCULATED.3IONS-SCNC: 9 MMOL/L (ref 7–15)
BILIRUB DIRECT SERPL-MCNC: 0.42 MG/DL (ref 0–0.3)
BILIRUB SERPL-MCNC: 1.6 MG/DL
BUN SERPL-MCNC: 10.3 MG/DL (ref 6–20)
CALCIUM SERPL-MCNC: 8.1 MG/DL (ref 8.6–10)
CHLORIDE SERPL-SCNC: 112 MMOL/L (ref 98–107)
CREAT SERPL-MCNC: 0.62 MG/DL (ref 0.51–0.95)
DEPRECATED HCO3 PLAS-SCNC: 20 MMOL/L (ref 22–29)
ERYTHROCYTE [DISTWIDTH] IN BLOOD BY AUTOMATED COUNT: 27.8 % (ref 10–15)
GFR SERPL CREATININE-BSD FRML MDRD: >90 ML/MIN/1.73M2
GLUCOSE SERPL-MCNC: 96 MG/DL (ref 70–99)
HCT VFR BLD AUTO: 19.2 % (ref 35–47)
HGB BLD-MCNC: 6.3 G/DL (ref 11.7–15.7)
HOLD SPECIMEN: NORMAL
MCH RBC QN AUTO: 29.3 PG (ref 26.5–33)
MCHC RBC AUTO-ENTMCNC: 32.8 G/DL (ref 31.5–36.5)
MCV RBC AUTO: 89 FL (ref 78–100)
PHOSPHATE SERPL-MCNC: 4.8 MG/DL (ref 2.5–4.5)
PLATELET # BLD AUTO: 471 10E3/UL (ref 150–450)
POTASSIUM SERPL-SCNC: 4.2 MMOL/L (ref 3.4–5.3)
RBC # BLD AUTO: 2.15 10E6/UL (ref 3.8–5.2)
RETICS # AUTO: 0.34 10E6/UL (ref 0.03–0.1)
RETICS/RBC NFR AUTO: 15.6 % (ref 0.5–2)
SODIUM SERPL-SCNC: 141 MMOL/L (ref 136–145)
WBC # BLD AUTO: 15.4 10E3/UL (ref 4–11)

## 2023-05-13 PROCEDURE — 250N000009 HC RX 250: Performed by: STUDENT IN AN ORGANIZED HEALTH CARE EDUCATION/TRAINING PROGRAM

## 2023-05-13 PROCEDURE — 250N000013 HC RX MED GY IP 250 OP 250 PS 637

## 2023-05-13 PROCEDURE — 250N000013 HC RX MED GY IP 250 OP 250 PS 637: Performed by: STUDENT IN AN ORGANIZED HEALTH CARE EDUCATION/TRAINING PROGRAM

## 2023-05-13 PROCEDURE — 258N000003 HC RX IP 258 OP 636: Performed by: STUDENT IN AN ORGANIZED HEALTH CARE EDUCATION/TRAINING PROGRAM

## 2023-05-13 PROCEDURE — 94640 AIRWAY INHALATION TREATMENT: CPT | Mod: 76

## 2023-05-13 PROCEDURE — 250N000011 HC RX IP 250 OP 636: Performed by: STUDENT IN AN ORGANIZED HEALTH CARE EDUCATION/TRAINING PROGRAM

## 2023-05-13 PROCEDURE — 85027 COMPLETE CBC AUTOMATED: CPT | Performed by: STUDENT IN AN ORGANIZED HEALTH CARE EDUCATION/TRAINING PROGRAM

## 2023-05-13 PROCEDURE — 99233 SBSQ HOSP IP/OBS HIGH 50: CPT | Performed by: STUDENT IN AN ORGANIZED HEALTH CARE EDUCATION/TRAINING PROGRAM

## 2023-05-13 PROCEDURE — 82248 BILIRUBIN DIRECT: CPT | Performed by: STUDENT IN AN ORGANIZED HEALTH CARE EDUCATION/TRAINING PROGRAM

## 2023-05-13 PROCEDURE — 80069 RENAL FUNCTION PANEL: CPT | Performed by: STUDENT IN AN ORGANIZED HEALTH CARE EDUCATION/TRAINING PROGRAM

## 2023-05-13 PROCEDURE — 999N000157 HC STATISTIC RCP TIME EA 10 MIN

## 2023-05-13 PROCEDURE — 120N000002 HC R&B MED SURG/OB UMMC

## 2023-05-13 PROCEDURE — 250N000013 HC RX MED GY IP 250 OP 250 PS 637: Performed by: INTERNAL MEDICINE

## 2023-05-13 PROCEDURE — 94642 AEROSOL INHALATION TREATMENT: CPT

## 2023-05-13 PROCEDURE — 250N000012 HC RX MED GY IP 250 OP 636 PS 637: Performed by: INTERNAL MEDICINE

## 2023-05-13 PROCEDURE — 85045 AUTOMATED RETICULOCYTE COUNT: CPT | Performed by: STUDENT IN AN ORGANIZED HEALTH CARE EDUCATION/TRAINING PROGRAM

## 2023-05-13 RX ORDER — ALBUTEROL SULFATE 5 MG/ML
5 SOLUTION, NON-ORAL INHALATION CONTINUOUS
Status: DISCONTINUED | OUTPATIENT
Start: 2023-05-13 | End: 2023-05-13

## 2023-05-13 RX ORDER — ALBUTEROL SULFATE 0.83 MG/ML
5 SOLUTION RESPIRATORY (INHALATION)
Status: DISCONTINUED | OUTPATIENT
Start: 2023-05-13 | End: 2023-05-13

## 2023-05-13 RX ORDER — ALBUTEROL SULFATE 0.83 MG/ML
5 SOLUTION RESPIRATORY (INHALATION)
Status: DISCONTINUED | OUTPATIENT
Start: 2023-05-13 | End: 2023-05-14

## 2023-05-13 RX ORDER — ALBUTEROL SULFATE 0.83 MG/ML
5 SOLUTION RESPIRATORY (INHALATION) EVERY 4 HOURS PRN
Status: DISCONTINUED | OUTPATIENT
Start: 2023-05-13 | End: 2023-05-13

## 2023-05-13 RX ORDER — ALBUTEROL SULFATE 5 MG/ML
2.5 SOLUTION RESPIRATORY (INHALATION)
Status: DISCONTINUED | OUTPATIENT
Start: 2023-05-13 | End: 2023-05-14

## 2023-05-13 RX ORDER — OXYCODONE HYDROCHLORIDE 10 MG/1
20 TABLET ORAL EVERY 4 HOURS PRN
Status: DISCONTINUED | OUTPATIENT
Start: 2023-05-13 | End: 2023-05-16 | Stop reason: HOSPADM

## 2023-05-13 RX ORDER — MAGNESIUM SULFATE HEPTAHYDRATE 40 MG/ML
2 INJECTION, SOLUTION INTRAVENOUS ONCE
Status: COMPLETED | OUTPATIENT
Start: 2023-05-13 | End: 2023-05-13

## 2023-05-13 RX ADMIN — HYDROMORPHONE HYDROCHLORIDE 1 MG: 1 INJECTION, SOLUTION INTRAMUSCULAR; INTRAVENOUS; SUBCUTANEOUS at 13:20

## 2023-05-13 RX ADMIN — ALBUTEROL SULFATE 5 MG: 2.5 SOLUTION RESPIRATORY (INHALATION) at 06:23

## 2023-05-13 RX ADMIN — KETAMINE HYDROCHLORIDE 4 MG/HR: 10 INJECTION, SOLUTION INTRAMUSCULAR; INTRAVENOUS at 22:54

## 2023-05-13 RX ADMIN — ACETAMINOPHEN 975 MG: 325 TABLET ORAL at 09:57

## 2023-05-13 RX ADMIN — HYDROXYUREA 3000 MG: 500 CAPSULE ORAL at 10:00

## 2023-05-13 RX ADMIN — ALBUTEROL SULFATE 5 MG: 2.5 SOLUTION RESPIRATORY (INHALATION) at 12:11

## 2023-05-13 RX ADMIN — ENOXAPARIN SODIUM 40 MG: 40 INJECTION SUBCUTANEOUS at 13:19

## 2023-05-13 RX ADMIN — ALBUTEROL SULFATE 5 MG: 2.5 SOLUTION RESPIRATORY (INHALATION) at 08:45

## 2023-05-13 RX ADMIN — OXYCODONE HYDROCHLORIDE 20 MG: 10 TABLET ORAL at 06:02

## 2023-05-13 RX ADMIN — KETOROLAC TROMETHAMINE 30 MG: 30 INJECTION, SOLUTION INTRAMUSCULAR; INTRAVENOUS at 06:03

## 2023-05-13 RX ADMIN — KETOROLAC TROMETHAMINE 30 MG: 30 INJECTION, SOLUTION INTRAMUSCULAR; INTRAVENOUS at 17:31

## 2023-05-13 RX ADMIN — FLUOXETINE HYDROCHLORIDE 10 MG: 10 CAPSULE ORAL at 09:58

## 2023-05-13 RX ADMIN — ALBUTEROL SULFATE 5 MG: 2.5 SOLUTION RESPIRATORY (INHALATION) at 19:29

## 2023-05-13 RX ADMIN — PREDNISONE 40 MG: 20 TABLET ORAL at 09:57

## 2023-05-13 RX ADMIN — HYDROMORPHONE HYDROCHLORIDE 1 MG: 1 INJECTION, SOLUTION INTRAMUSCULAR; INTRAVENOUS; SUBCUTANEOUS at 21:25

## 2023-05-13 RX ADMIN — OXYCODONE HYDROCHLORIDE 20 MG: 10 TABLET ORAL at 14:20

## 2023-05-13 RX ADMIN — ALBUTEROL SULFATE 5 MG: 2.5 SOLUTION RESPIRATORY (INHALATION) at 04:33

## 2023-05-13 RX ADMIN — OXYCODONE HYDROCHLORIDE 20 MG: 10 TABLET ORAL at 02:03

## 2023-05-13 RX ADMIN — ACETAMINOPHEN 975 MG: 325 TABLET ORAL at 16:01

## 2023-05-13 RX ADMIN — ALBUTEROL SULFATE 5 MG: 2.5 SOLUTION RESPIRATORY (INHALATION) at 01:07

## 2023-05-13 RX ADMIN — KETAMINE HYDROCHLORIDE 6 MG/HR: 10 INJECTION, SOLUTION INTRAMUSCULAR; INTRAVENOUS at 02:50

## 2023-05-13 RX ADMIN — OXYCODONE HYDROCHLORIDE 20 MG: 10 TABLET ORAL at 09:57

## 2023-05-13 RX ADMIN — MAGNESIUM SULFATE IN WATER 2 G: 40 INJECTION, SOLUTION INTRAVENOUS at 14:21

## 2023-05-13 RX ADMIN — ACETAMINOPHEN 975 MG: 325 TABLET ORAL at 21:26

## 2023-05-13 RX ADMIN — HYDROMORPHONE HYDROCHLORIDE 1 MG: 1 INJECTION, SOLUTION INTRAMUSCULAR; INTRAVENOUS; SUBCUTANEOUS at 07:42

## 2023-05-13 RX ADMIN — KETOROLAC TROMETHAMINE 30 MG: 30 INJECTION, SOLUTION INTRAMUSCULAR; INTRAVENOUS at 12:32

## 2023-05-13 RX ADMIN — PANTOPRAZOLE SODIUM 40 MG: 40 TABLET, DELAYED RELEASE ORAL at 09:57

## 2023-05-13 RX ADMIN — HYDROMORPHONE HYDROCHLORIDE 1 MG: 1 INJECTION, SOLUTION INTRAMUSCULAR; INTRAVENOUS; SUBCUTANEOUS at 00:57

## 2023-05-13 RX ADMIN — ALBUTEROL SULFATE 5 MG: 2.5 SOLUTION RESPIRATORY (INHALATION) at 16:11

## 2023-05-13 ASSESSMENT — ACTIVITIES OF DAILY LIVING (ADL)
ADLS_ACUITY_SCORE: 18
ADLS_ACUITY_SCORE: 18
WALKING_OR_CLIMBING_STAIRS_DIFFICULTY: NO
ADLS_ACUITY_SCORE: 18
ADLS_ACUITY_SCORE: 18
CHANGE_IN_FUNCTIONAL_STATUS_SINCE_ONSET_OF_CURRENT_ILLNESS/INJURY: NO
ADLS_ACUITY_SCORE: 18
DOING_ERRANDS_INDEPENDENTLY_DIFFICULTY: NO
CONCENTRATING,_REMEMBERING_OR_MAKING_DECISIONS_DIFFICULTY: NO
DIFFICULTY_EATING/SWALLOWING: NO
ADLS_ACUITY_SCORE: 18
TOILETING_ISSUES: NO
ADLS_ACUITY_SCORE: 18
FALL_HISTORY_WITHIN_LAST_SIX_MONTHS: NO
WEAR_GLASSES_OR_BLIND: NO
DRESSING/BATHING_DIFFICULTY: NO
ADLS_ACUITY_SCORE: 18
ADLS_ACUITY_SCORE: 18

## 2023-05-13 NOTE — PLAN OF CARE
Hours of care: [id number]    Neuro: A&Ox4.   Cardiac: Afebrile, VSS.   Respiratory: 2L NC, lung sounds have expiratory wheezing, dyspnea with exertion  GI/: Voiding spontaneously. No BM this shift.  Diet/appetite: Tolerating regular diet . Denies nausea.   Activity: Up with standby assist    Pain: C/o pain 5-6/10, controlled with current regimen  Skin: No new deficits noted.  Lines: L Port, Ketamine drip @ 4mg/hr  Drains: none  Replacements: none    Plan: Continue with current POC. Report changes to primary team.

## 2023-05-13 NOTE — PROGRESS NOTES
[facility]    Medicine Progress Note - Hospitalist Service, GOLD TEAM 8    Date of Admission:  5/10/2023    Assessment & Plan   Jennifer Cervantes is a 23-year-old F with a history of HbSS disease complicated by acute chest syndrome (last in 09/2022), CVA, and iron overload secondary to frequent transfusions who presented to the ED on 5/10 due to acute sickle cell pain crisis and shortness of breath, admitted for pain control and management of acute hypoxic respiratory failure secondary to asthma exacerbation.    CHANGES TODAY:  - Increased frequency of albuterol nebs and ordered mag sulfate x2  - Continue ketamine gtt, scheduled tylenol/toradol/oxycodone    # HbSS disease complicated by acute chest syndrome and recurrent vaso-occlusive crises  # Acute pain crisis  # History of CVA managed with transfusions, complicated by iron overload  # Acute normocytic anemia, Hb 6.4 today  - Current pain management plan:   - Ketamine 6mg/hr (per care plan, do not increase further because Jennifer has felt quite poorly with 8mg/hr in the past)   - Currently on 20mg oxycodone q4h scheduled    - Start Toradol q6h scheduled   - Acetaminophen 975mg TID scheduled    - Miralax and senna BID scheduled   - mIVF with NS at 75mL/hr  - Hematology consulted, appreciate recommendations   - No concern for acute chest at this time, so switch therapeutic enoxaparin to prophylactic dosing   - Don't transfuse without discussing with hematology first   - Continue PTA deferasirox daily  - Continue PTA hydroxyurea    # Acute hypoxic respiratory failure secondary to asthma exacerbation  # Moderate persistent asthma  V/Q scan negative for PE.   - Albuterol nebs q3h scheduled and PRN  - s/p solumedrol IV x1 in the ED; complete a 5-day course of prednisone 40mg daily   - Continue PTA ICS/LABA controller inhaler    # Reactive thrombocytosis  Continue to monitor clinically.    # Leukocytosis, improving  WBC 18.2 on  "admission, actually improved from 21.3 drawn three days prior to admission. Procal borderline at 0.31. No obvious source of infection on workup this far-- suspect that this is reactive in light of acute pain crisis and steroids given for asthma exacerbation. Continue to monitor for developing infection.      # Depression  - Continue PTA fluoxetine daily      Diet: Combination Diet Regular Diet Adult    DVT Prophylaxis: Enoxaparin (Lovenox) SQ  Lopez Catheter: Not present  Lines: PRESENT      Port A Cath Single 04/21/21 Left Chest wall-Site Assessment: WDL      Cardiac Monitoring: None  Code Status: Full Code      Clinically Significant Risk Factors          # Hypocalcemia: Lowest Ca = 8.3 mg/dL in last 2 days, will monitor and replace as appropriate      # Coagulation Defect: INR = 1.32 (Ref range: 0.85 - 1.15) and/or PTT = N/A, will monitor for bleeding           # Overweight: Estimated body mass index is 27.46 kg/m  as calculated from the following:    Height as of this encounter: 1.626 m (5' 4\").    Weight as of this encounter: 72.6 kg (160 lb)., PRESENT ON ADMISSION          Disposition Plan     Expected Discharge Date: 05/12/2023                  Ivy Montanez MD  Hospitalist Service, [facility] 8  [facility]  Securely message with [employer] (more info)  Text page via [employer] Paging/Directory   See signed in provider for up to date coverage information  ______________________________________________________________________    Interval History   No acute events over night. Jennifer was in quite a bit of pain when I saw her this morning and also reports that her chest feels tight; on exam, she was quite wheezy throughout. She's agreeable to increasing frequency of nebs-- I think her asthma exacerbation hasn't gotten much better. She was tired this morning after spending the night in the ED.     Physical Exam   Vital Signs: Temp: 97.7  F (36.5  C) Temp src: Oral BP: 127/82 " Pulse: 80   Resp: 22 SpO2: 99 % O2 Device: Nasal cannula Oxygen Delivery: 2 LPM  Weight: 160 lbs 0 oz    GENERAL: The patient is awake and alert, appears uncomfortable.  HEAD: Atraumatic, normocephalic. Sclerae are white without injection or icterus.  NOSE: Without deformity, bleeding or discharge.   ORAL CAVITY: No swelling or abnormality to the lip or teeth. Oral mucosa is pink and moist.   NECK: No signs of meningismus. No adenopathy is noted. No JVD is seen.   LUNGS: On 2LPM NC when I saw her this morning; no increased work of breathing, but she does have wheezing throughout her chest and prolonged expiratory phase.   ABDOMEN: Soft, nondistended.  PSYCHIATRIC: The patient is oriented x4. Mood and affect are appropriate.     Data     I have personally reviewed the following data over the past 24 hrs:    12.0 (H)  \   6.5 (LL)   / 556 (H)     141 111 (H) 8.7 /  100 (H)   4.3 20 (L) 0.67 \       ALT: N/A AST: N/A AP: N/A TBILI: 1.4 (H)   ALB: 4.0 TOT PROTEIN: N/A LIPASE: N/A       Ferritin:  N/A % Retic:  18.5 (H) LDH:  N/A

## 2023-05-13 NOTE — PLAN OF CARE
Problem: Pain Acute  Goal: Optimal Pain Control and Function  Outcome: Not Progressing   Goal Outcome Evaluation:      Plan of Care Reviewed With: patient    Overall Patient Progress: no changeOverall Patient Progress: no change

## 2023-05-13 NOTE — PROGRESS NOTES
Pedro Tinajero, DO    For pt Billy, T on 7D, can you pls order covid test, was not swabbed in ED. Thanks

## 2023-05-13 NOTE — PROGRESS NOTES
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  # Acute pain crisis  # History of CVA managed with transfusions, complicated by iron overload  # Acute normocytic anemia, Hb 6.4 today  - Current pain management plan:   - Ketamine 4mg/hr (per care plan, do not increase further because Jennifer has felt quite poorly with 8mg/hr in the past)   - Currently on 20mg oxycodone q4h scheduled    - Start Toradol q6h scheduled   - Acetaminophen 975mg TID scheduled    - Miralax and senna BID scheduled   - mIVF with NS at 75mL/hr  - Hematology consulted, appreciate recommendations   - No concern for acute chest at this time, so switch therapeutic enoxaparin to prophylactic dosing   - Don't transfuse without discussing with hematology first   - Continue PTA deferasirox daily  - Continue PTA hydroxyurea    # Acute hypoxic respiratory failure secondary to asthma exacerbation  # Moderate persistent asthma  V/Q scan negative for PE.   - Albuterol nebs q4h scheduled and q2h PRN  - s/p solumedrol IV x1 in the ED; complete a 5-day course of prednisone 40mg daily   - Continue PTA ICS/LABA controller inhaler    # Depression  - Continue PTA fluoxetine daily      Diet: Combination  "Diet Regular Diet Adult    DVT Prophylaxis: Enoxaparin (Lovenox) SQ  Lopez Catheter: Not present  Lines: PRESENT      Port A Cath Single 04/21/21 Left Chest wall-Site Assessment: WDL      Cardiac Monitoring: None  Code Status: Full Code      Clinically Significant Risk Factors          # Hypocalcemia: Lowest Ca = 8.1 mg/dL in last 2 days, will monitor and replace as appropriate                # Overweight: Estimated body mass index is 27.33 kg/m  as calculated from the following:    Height as of this encounter: 1.626 m (5' 4\").    Weight as of this encounter: 72.2 kg (159 lb 3.2 oz)., PRESENT ON ADMISSION          Disposition Plan      Expected Discharge Date: 05/16/2023                  Ivy Montanez MD  Hospitalist Service, [address]  Securely message with [employer] (more info)  Text page via [facility] Paging/Directory   See signed in provider for up to date coverage information  ______________________________________________________________________    Interval History   No acute events over night. Jennifer was sleepy but easily arousable. She reports ongoing chest tightness but not more so than she felt yesterday; she's been getting nebs frequently, with reportedly little response. Her pain is marginally better, and she's hoping to discharge by Monday or Tuesday. Nursing updated on plan of care.     Physical Exam   Vital Signs: Temp: 98.1  F (36.7  C) Temp src: Axillary BP: 114/64 Pulse: 96   Resp: 18 SpO2: 94 % O2 Device: Nasal cannula Oxygen Delivery: 2 LPM  Weight: 159 lbs 3.2 oz    GENERAL: The patient is awake and alert, in no acute distress.  HEAD: Atraumatic, normocephalic. Sclerae are white without injection or icterus.  NOSE: Without deformity, bleeding or discharge.   ORAL CAVITY: No swelling or abnormality to the lip or teeth. Oral mucosa is pink and moist.   NECK: No signs of meningismus. No adenopathy is noted. No JVD is seen.   LUNGS: On RA. " Mildly tachypneic, with prolonged expiratory phase and wheezing throughout chest. Good air movement noted throughout chest.   ABDOMEN: Soft, nondistended.  PSYCHIATRIC: The patient is oriented x4. Mood and affect are appropriate.     Data     I have personally reviewed the following data over the past 24 hrs:    15.4 (H)  \   6.3 (LL)   / 471 (H)     141 112 (H) 10.3 /  96   4.2 20 (L) 0.62 \       ALT: N/A AST: N/A AP: N/A TBILI: 1.6 (H)   ALB: 3.8 TOT PROTEIN: N/A LIPASE: N/A       Ferritin:  N/A % Retic:  15.6 (H) LDH:  N/A

## 2023-05-13 NOTE — PLAN OF CARE
Goal Outcome Evaluation:      Plan of Care Reviewed With: patient    Overall Patient Progress: no changeOverall Patient Progress: no change       1966  Status: admitted 5/10 with SOB, asthma exacerbation. Hx sickle cell  Vitals: Sating 94% on 2L. Desats to low 80s on RA. Other VSS  Neuros: Sleepy but wakes easily. Ox4. R hand weakness from previous CVA.   IV: port infusing ketamine gtt @6mg/hr  Labs/Electrolytes: mag infusing per provider order   Resp: audible wheezing, nebs scheduled q4h with q2h PRN. Dyspnea with activity.   Diet: regular diet. Eating peanut butter and jellies  Bowel status: had bm today  : Voiding  Pain: ~8/10. Ketamine gtt 6mg/hr. Scheduled toradol and oxycodone. IV dilaudid q8h PRN.  Activity: SBA  Plan: monitor respiratory status. Contact RT if PRN neb needed. pain control.

## 2023-05-13 NOTE — INTERIM SUMMARY
Cross Cover  Called regarding 10/10 pain. Chart reviewed patient here for sickle cell pain crisis. Will start 1 mg IV dilaudid Q4H PRN per signout.     Pedro Tinajero DO on 5/13/2023 at 12:22 AM

## 2023-05-13 NOTE — PROGRESS NOTES
Nursing Focus: Admission    D: Patient admitted/transferred from ED via BED for Sickle Cell Pain Management.      I: Upon arrival to the unit patient was oriented to room, unit, and call light. Patient s height, weight, and vital signs were obtained. Allergies reviewed and allergy band applied. MD notified of patient s arrival on the unit. Adult AVS completed. Head to toe assessment completed. Education assessment completed. Care plan initiated.     A: Vital signs stable upon admission. Patient rates pain at 10/10. Two RN skin assessment completed Yes. Second RN was Nu STALEY Significant Skin Findings include None. [facility] Nurse Consult Ordered No. Bed Algorithm can be found in PCS flow sheets (Support Surface Algorithm) and on IP [facility] NURSE RESOURCE TAB, was this used during this assessment? No. Was a pulsate mattress ordered No.     P: Continue to monitor patient s Pain and intervene as needed. Continue with plan of care. Notify MD with any concerns or changes in patient status.

## 2023-05-14 LAB
BILIRUB SERPL-MCNC: 1.5 MG/DL
ERYTHROCYTE [DISTWIDTH] IN BLOOD BY AUTOMATED COUNT: 26.9 % (ref 10–15)
HCT VFR BLD AUTO: 21.5 % (ref 35–47)
HGB BLD-MCNC: 7.2 G/DL (ref 11.7–15.7)
MCH RBC QN AUTO: 29.1 PG (ref 26.5–33)
MCHC RBC AUTO-ENTMCNC: 33.5 G/DL (ref 31.5–36.5)
MCV RBC AUTO: 87 FL (ref 78–100)
PLATELET # BLD AUTO: 511 10E3/UL (ref 150–450)
RBC # BLD AUTO: 2.47 10E6/UL (ref 3.8–5.2)
RETICS # AUTO: 0.36 10E6/UL (ref 0.03–0.1)
RETICS/RBC NFR AUTO: 14.8 % (ref 0.5–2)
WBC # BLD AUTO: 19.4 10E3/UL (ref 4–11)

## 2023-05-14 PROCEDURE — 85045 AUTOMATED RETICULOCYTE COUNT: CPT | Performed by: STUDENT IN AN ORGANIZED HEALTH CARE EDUCATION/TRAINING PROGRAM

## 2023-05-14 PROCEDURE — 94640 AIRWAY INHALATION TREATMENT: CPT

## 2023-05-14 PROCEDURE — 250N000011 HC RX IP 250 OP 636: Performed by: STUDENT IN AN ORGANIZED HEALTH CARE EDUCATION/TRAINING PROGRAM

## 2023-05-14 PROCEDURE — 250N000013 HC RX MED GY IP 250 OP 250 PS 637

## 2023-05-14 PROCEDURE — 250N000009 HC RX 250: Performed by: STUDENT IN AN ORGANIZED HEALTH CARE EDUCATION/TRAINING PROGRAM

## 2023-05-14 PROCEDURE — 250N000013 HC RX MED GY IP 250 OP 250 PS 637: Performed by: INTERNAL MEDICINE

## 2023-05-14 PROCEDURE — 94640 AIRWAY INHALATION TREATMENT: CPT | Mod: 76

## 2023-05-14 PROCEDURE — 120N000002 HC R&B MED SURG/OB UMMC

## 2023-05-14 PROCEDURE — 250N000012 HC RX MED GY IP 250 OP 636 PS 637: Performed by: INTERNAL MEDICINE

## 2023-05-14 PROCEDURE — 82247 BILIRUBIN TOTAL: CPT | Performed by: STUDENT IN AN ORGANIZED HEALTH CARE EDUCATION/TRAINING PROGRAM

## 2023-05-14 PROCEDURE — 250N000013 HC RX MED GY IP 250 OP 250 PS 637: Performed by: STUDENT IN AN ORGANIZED HEALTH CARE EDUCATION/TRAINING PROGRAM

## 2023-05-14 PROCEDURE — 85027 COMPLETE CBC AUTOMATED: CPT | Performed by: STUDENT IN AN ORGANIZED HEALTH CARE EDUCATION/TRAINING PROGRAM

## 2023-05-14 PROCEDURE — 999N000157 HC STATISTIC RCP TIME EA 10 MIN

## 2023-05-14 PROCEDURE — 99233 SBSQ HOSP IP/OBS HIGH 50: CPT | Performed by: STUDENT IN AN ORGANIZED HEALTH CARE EDUCATION/TRAINING PROGRAM

## 2023-05-14 RX ORDER — LEVALBUTEROL INHALATION SOLUTION 1.25 MG/3ML
1.25 SOLUTION RESPIRATORY (INHALATION)
Status: DISCONTINUED | OUTPATIENT
Start: 2023-05-14 | End: 2023-05-16 | Stop reason: HOSPADM

## 2023-05-14 RX ADMIN — FLUOXETINE HYDROCHLORIDE 10 MG: 10 CAPSULE ORAL at 11:10

## 2023-05-14 RX ADMIN — HYDROXYUREA 3000 MG: 500 CAPSULE ORAL at 11:07

## 2023-05-14 RX ADMIN — ALBUTEROL SULFATE 5 MG: 2.5 SOLUTION RESPIRATORY (INHALATION) at 09:14

## 2023-05-14 RX ADMIN — ACETAMINOPHEN 975 MG: 325 TABLET ORAL at 16:44

## 2023-05-14 RX ADMIN — OXYCODONE HYDROCHLORIDE 20 MG: 10 TABLET ORAL at 08:23

## 2023-05-14 RX ADMIN — KETOROLAC TROMETHAMINE 30 MG: 30 INJECTION, SOLUTION INTRAMUSCULAR; INTRAVENOUS at 18:42

## 2023-05-14 RX ADMIN — PANTOPRAZOLE SODIUM 40 MG: 40 TABLET, DELAYED RELEASE ORAL at 11:10

## 2023-05-14 RX ADMIN — HYDROMORPHONE HYDROCHLORIDE 1 MG: 1 INJECTION, SOLUTION INTRAMUSCULAR; INTRAVENOUS; SUBCUTANEOUS at 06:35

## 2023-05-14 RX ADMIN — POLYETHYLENE GLYCOL 3350 17 G: 17 POWDER, FOR SOLUTION ORAL at 21:46

## 2023-05-14 RX ADMIN — KETOROLAC TROMETHAMINE 30 MG: 30 INJECTION, SOLUTION INTRAMUSCULAR; INTRAVENOUS at 09:25

## 2023-05-14 RX ADMIN — ACETAMINOPHEN 975 MG: 325 TABLET ORAL at 21:46

## 2023-05-14 RX ADMIN — KETOROLAC TROMETHAMINE 30 MG: 30 INJECTION, SOLUTION INTRAMUSCULAR; INTRAVENOUS at 00:15

## 2023-05-14 RX ADMIN — LEVALBUTEROL HYDROCHLORIDE 1.25 MG: 1.25 SOLUTION RESPIRATORY (INHALATION) at 15:41

## 2023-05-14 RX ADMIN — FLUTICASONE FUROATE 1 PUFF: 100 POWDER RESPIRATORY (INHALATION) at 11:11

## 2023-05-14 RX ADMIN — FLUTICASONE FUROATE AND VILANTEROL TRIFENATATE 1 PUFF: 200; 25 POWDER RESPIRATORY (INHALATION) at 11:11

## 2023-05-14 RX ADMIN — ENOXAPARIN SODIUM 40 MG: 40 INJECTION SUBCUTANEOUS at 12:34

## 2023-05-14 RX ADMIN — OXYCODONE HYDROCHLORIDE 20 MG: 10 TABLET ORAL at 12:34

## 2023-05-14 RX ADMIN — HYDROMORPHONE HYDROCHLORIDE 1 MG: 1 INJECTION, SOLUTION INTRAMUSCULAR; INTRAVENOUS; SUBCUTANEOUS at 22:34

## 2023-05-14 RX ADMIN — HYDROMORPHONE HYDROCHLORIDE 1 MG: 1 INJECTION, SOLUTION INTRAMUSCULAR; INTRAVENOUS; SUBCUTANEOUS at 14:27

## 2023-05-14 RX ADMIN — PREDNISONE 40 MG: 20 TABLET ORAL at 11:10

## 2023-05-14 RX ADMIN — LEVALBUTEROL HYDROCHLORIDE 1.25 MG: 1.25 SOLUTION RESPIRATORY (INHALATION) at 12:18

## 2023-05-14 RX ADMIN — HYDROMORPHONE HYDROCHLORIDE 1 MG: 1 INJECTION, SOLUTION INTRAMUSCULAR; INTRAVENOUS; SUBCUTANEOUS at 11:13

## 2023-05-14 RX ADMIN — LEVALBUTEROL HYDROCHLORIDE 1.25 MG: 1.25 SOLUTION RESPIRATORY (INHALATION) at 21:08

## 2023-05-14 RX ADMIN — ACETAMINOPHEN 975 MG: 325 TABLET ORAL at 09:25

## 2023-05-14 RX ADMIN — OXYCODONE HYDROCHLORIDE 20 MG: 10 TABLET ORAL at 16:44

## 2023-05-14 ASSESSMENT — ACTIVITIES OF DAILY LIVING (ADL)
ADLS_ACUITY_SCORE: 18

## 2023-05-14 NOTE — PLAN OF CARE
NEURO: patient did not get much sleep and slept until approximately 11ish. alert and oriented x4.   RESPIRATORY: LS wheezes, SpO2 % on 3 LPM.  CARDIO: VSS. Hgb 7.2.  GI/: BS active, + 1 large watery, malodorous, brown stool. Held daily laxatives as pt had 3 loose/watery BMs overnight. Will continue to monitor diarrhea. Voiding without issue. Tolerating PO intake, fair appetite.   SKIN: Intact.   ACTIVITY: Up with SBA to BR. Up ad janett to bedside commode.   PAIN: Reported 9 throughout day. Patient given 2 mg of dilaudid (2x 1 mg doses) , 40 mg oxycodone (2 x 20 mg doses), 1 x scheduled toradol and tylenol. Ketamine gtt decreased to 2 ml/hour (from 4 ml/hr) per patient and MD discussion as patient does not get as much relief from ketamine. Will continue to monitor pain and will notify MD for concerns.   LINES: PICC infusing- site WNL.   PLAN:  Continue POC - notify MD with concerns.

## 2023-05-14 NOTE — PLAN OF CARE
Goal Outcome Evaluation: Ongoing       Plan of Care Reviewed With: patient     Status: admitted 5/10 with SOB, asthma exacerbation. Hx sickle cell  Vitals: WDL on 3L O2 via nasal canula sat mid 90's.   Neuros: A/Ox4. R hand weakness from previous CVA.   IV: port infusing ketamine gtt @4mg/hr  Labs/Electrolytes: Reviewed  Resp: audible wheezing,dyspnea with activity.   Diet: regular diet, denies nausea/vomiting   Bowel status: Had 2 unwitnessed BM pt denied BM being loose. 2 witnessed BM on the commode watery. Team will be updated.  : Voiding  Pain: Ketamine gtt 4mg/hr. Scheduled Toradol and IV dilaudid q8h PRN given.  Activity: SBA  Plan: monitor respiratory status. Contact RT if PRN neb needed. pain control. Continue to monitor per POC.

## 2023-05-14 NOTE — PROGRESS NOTES
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 and recurrent vaso-occlusive crises  # Acute pain crisis  # History of CVA managed with transfusions, complicated by iron overload  # Acute normocytic anemia  - Current pain management plan:   - Ketamine 2mg/hr (per care plan, do not increase further because Jennifer has felt quite poorly with 8mg/hr in the past)   - Currently on 20mg oxycodone q4h PRN, hydromorphone 1mg IV q8h PRN   - Start Toradol q6h scheduled   - Acetaminophen 975mg TID scheduled    - Miralax and senna BID scheduled   - Hematology consulted, appreciate recommendations   - No concern for acute chest at this time, so switch therapeutic enoxaparin to prophylactic dosing   - Don't transfuse without discussing with hematology first   - Continue PTA deferasirox daily  - Continue PTA hydroxyurea    # Acute hypoxic respiratory failure secondary to asthma exacerbation  # Moderate persistent asthma  V/Q scan negative for PE.   - Xopenex nebs q4h scheduled and q2h PRN  - s/p solumedrol IV x1 in the ED; complete a 5-day course of prednisone  "40mg daily   - Continue PTA ICS/LABA controller inhaler    # Depression  - Continue PTA fluoxetine daily      Diet: Combination Diet Regular Diet Adult    DVT Prophylaxis: Enoxaparin (Lovenox) SQ  Lopez Catheter: Not present  Lines: PRESENT      Port A Cath Single 04/21/21 Left Chest wall-Site Assessment: WDL      Cardiac Monitoring: None  Code Status: Full Code      Clinically Significant Risk Factors          # Hypocalcemia: Lowest Ca = 8.1 mg/dL in last 2 days, will monitor and replace as appropriate                # Overweight: Estimated body mass index is 27.33 kg/m  as calculated from the following:    Height as of this encounter: 1.626 m (5' 4\").    Weight as of this encounter: 72.2 kg (159 lb 3.2 oz)., PRESENT ON ADMISSION          Disposition Plan      Expected Discharge Date: 05/16/2023                  Ivy Montanez MD  Hospitalist Service, [address]  Securely message with [employer] (more info)  Text page via [facility] Paging/Directory   See signed in provider for up to date coverage information  ______________________________________________________________________    Interval History   No acute events over night. Jennifer was still in some pain this morning but hopeful to discharge tomorrow or Tuesday. Reports no more chest tightness. She's on 2LPM NC still. Denies shortness of breath. Nursing notes reviewed, and nursing was updated on plan of care.    Physical Exam   Vital Signs: Temp: (!) 96.6  F (35.9  C) Temp src: Axillary BP: 122/53 Pulse: 115   Resp: 20 SpO2: 97 % O2 Device: Nasal cannula Oxygen Delivery: 2 LPM  Weight: 159 lbs 3.2 oz    GENERAL: The patient is awake and alert, in no acute distress.  HEAD: Atraumatic, normocephalic. Sclerae are white without injection or icterus.  NOSE: Without deformity, bleeding or discharge.   ORAL CAVITY: No swelling or abnormality to the lip or teeth. Oral mucosa is pink and moist.   NECK: No signs of " meningismus. No adenopathy is noted. No JVD is seen.   LUNGS: On 2LPM. Scattered wheezes throughout chest but air movement has improved compared to yesterday, and wheezes are not persistent throughout lung fields like they were yesterday.  ABDOMEN: Soft, nondistended.  PSYCHIATRIC: The patient is oriented x4. Mood and affect are appropriate.     Data     I have personally reviewed the following data over the past 24 hrs:    19.4 (H)  \   7.2 (L)   / 511 (H)     N/A N/A N/A /  N/A   N/A N/A N/A \       ALT: N/A AST: N/A AP: N/A TBILI: 1.5 (H)   ALB: N/A TOT PROTEIN: N/A LIPASE: N/A       Ferritin:  N/A % Retic:  14.8 (H) LDH:  N/A

## 2023-05-15 LAB
ERYTHROCYTE [DISTWIDTH] IN BLOOD BY AUTOMATED COUNT: ABNORMAL %
HCT VFR BLD AUTO: 20.6 % (ref 35–47)
HGB BLD-MCNC: 6.8 G/DL (ref 11.7–15.7)
MCH RBC QN AUTO: 28.6 PG (ref 26.5–33)
MCHC RBC AUTO-ENTMCNC: 33 G/DL (ref 31.5–36.5)
MCV RBC AUTO: 87 FL (ref 78–100)
PLATELET # BLD AUTO: 479 10E3/UL (ref 150–450)
RBC # BLD AUTO: 2.38 10E6/UL (ref 3.8–5.2)
WBC # BLD AUTO: 12.2 10E3/UL (ref 4–11)

## 2023-05-15 PROCEDURE — 120N000002 HC R&B MED SURG/OB UMMC

## 2023-05-15 PROCEDURE — 999N000157 HC STATISTIC RCP TIME EA 10 MIN

## 2023-05-15 PROCEDURE — 250N000009 HC RX 250: Performed by: STUDENT IN AN ORGANIZED HEALTH CARE EDUCATION/TRAINING PROGRAM

## 2023-05-15 PROCEDURE — 250N000011 HC RX IP 250 OP 636: Performed by: STUDENT IN AN ORGANIZED HEALTH CARE EDUCATION/TRAINING PROGRAM

## 2023-05-15 PROCEDURE — 250N000013 HC RX MED GY IP 250 OP 250 PS 637

## 2023-05-15 PROCEDURE — 94640 AIRWAY INHALATION TREATMENT: CPT | Mod: 76

## 2023-05-15 PROCEDURE — 258N000003 HC RX IP 258 OP 636: Performed by: STUDENT IN AN ORGANIZED HEALTH CARE EDUCATION/TRAINING PROGRAM

## 2023-05-15 PROCEDURE — 250N000012 HC RX MED GY IP 250 OP 636 PS 637: Performed by: INTERNAL MEDICINE

## 2023-05-15 PROCEDURE — 99233 SBSQ HOSP IP/OBS HIGH 50: CPT | Performed by: STUDENT IN AN ORGANIZED HEALTH CARE EDUCATION/TRAINING PROGRAM

## 2023-05-15 PROCEDURE — 85027 COMPLETE CBC AUTOMATED: CPT | Performed by: STUDENT IN AN ORGANIZED HEALTH CARE EDUCATION/TRAINING PROGRAM

## 2023-05-15 PROCEDURE — 250N000013 HC RX MED GY IP 250 OP 250 PS 637: Performed by: STUDENT IN AN ORGANIZED HEALTH CARE EDUCATION/TRAINING PROGRAM

## 2023-05-15 PROCEDURE — 94640 AIRWAY INHALATION TREATMENT: CPT

## 2023-05-15 PROCEDURE — 250N000013 HC RX MED GY IP 250 OP 250 PS 637: Performed by: INTERNAL MEDICINE

## 2023-05-15 RX ORDER — HEPARIN SODIUM,PORCINE 10 UNIT/ML
5-10 VIAL (ML) INTRAVENOUS EVERY 24 HOURS
Status: DISCONTINUED | OUTPATIENT
Start: 2023-05-15 | End: 2023-05-16 | Stop reason: HOSPADM

## 2023-05-15 RX ORDER — HEPARIN SODIUM (PORCINE) LOCK FLUSH IV SOLN 100 UNIT/ML 100 UNIT/ML
5-10 SOLUTION INTRAVENOUS
Status: DISCONTINUED | OUTPATIENT
Start: 2023-05-15 | End: 2023-05-16 | Stop reason: HOSPADM

## 2023-05-15 RX ORDER — HEPARIN SODIUM,PORCINE 10 UNIT/ML
5-10 VIAL (ML) INTRAVENOUS
Status: DISCONTINUED | OUTPATIENT
Start: 2023-05-15 | End: 2023-05-16 | Stop reason: HOSPADM

## 2023-05-15 RX ADMIN — ENOXAPARIN SODIUM 40 MG: 40 INJECTION SUBCUTANEOUS at 13:10

## 2023-05-15 RX ADMIN — PANTOPRAZOLE SODIUM 40 MG: 40 TABLET, DELAYED RELEASE ORAL at 09:28

## 2023-05-15 RX ADMIN — LEVALBUTEROL HYDROCHLORIDE 1.25 MG: 1.25 SOLUTION RESPIRATORY (INHALATION) at 20:45

## 2023-05-15 RX ADMIN — ACETAMINOPHEN 975 MG: 325 TABLET ORAL at 22:33

## 2023-05-15 RX ADMIN — KETOROLAC TROMETHAMINE 30 MG: 30 INJECTION, SOLUTION INTRAMUSCULAR; INTRAVENOUS at 13:11

## 2023-05-15 RX ADMIN — HYDROMORPHONE HYDROCHLORIDE 1 MG: 1 INJECTION, SOLUTION INTRAMUSCULAR; INTRAVENOUS; SUBCUTANEOUS at 16:02

## 2023-05-15 RX ADMIN — LEVALBUTEROL HYDROCHLORIDE 1.25 MG: 1.25 SOLUTION RESPIRATORY (INHALATION) at 16:31

## 2023-05-15 RX ADMIN — PREDNISONE 40 MG: 20 TABLET ORAL at 09:28

## 2023-05-15 RX ADMIN — ACETAMINOPHEN 975 MG: 325 TABLET ORAL at 09:27

## 2023-05-15 RX ADMIN — FLUOXETINE HYDROCHLORIDE 10 MG: 10 CAPSULE ORAL at 09:28

## 2023-05-15 RX ADMIN — ACETAMINOPHEN 975 MG: 325 TABLET ORAL at 17:14

## 2023-05-15 RX ADMIN — OXYCODONE HYDROCHLORIDE 20 MG: 10 TABLET ORAL at 04:59

## 2023-05-15 RX ADMIN — KETAMINE HYDROCHLORIDE 2 MG/HR: 10 INJECTION, SOLUTION INTRAMUSCULAR; INTRAVENOUS at 06:15

## 2023-05-15 RX ADMIN — LEVALBUTEROL HYDROCHLORIDE 1.25 MG: 1.25 SOLUTION RESPIRATORY (INHALATION) at 13:30

## 2023-05-15 RX ADMIN — KETOROLAC TROMETHAMINE 30 MG: 30 INJECTION, SOLUTION INTRAMUSCULAR; INTRAVENOUS at 00:47

## 2023-05-15 RX ADMIN — OXYCODONE HYDROCHLORIDE 20 MG: 10 TABLET ORAL at 22:33

## 2023-05-15 RX ADMIN — HYDROXYUREA 3000 MG: 500 CAPSULE ORAL at 09:47

## 2023-05-15 RX ADMIN — KETOROLAC TROMETHAMINE 30 MG: 30 INJECTION, SOLUTION INTRAMUSCULAR; INTRAVENOUS at 18:40

## 2023-05-15 RX ADMIN — Medication 5 ML: at 18:40

## 2023-05-15 RX ADMIN — KETOROLAC TROMETHAMINE 30 MG: 30 INJECTION, SOLUTION INTRAMUSCULAR; INTRAVENOUS at 06:26

## 2023-05-15 RX ADMIN — LEVALBUTEROL HYDROCHLORIDE 1.25 MG: 1.25 SOLUTION RESPIRATORY (INHALATION) at 08:20

## 2023-05-15 RX ADMIN — HYDROMORPHONE HYDROCHLORIDE 1 MG: 1 INJECTION, SOLUTION INTRAMUSCULAR; INTRAVENOUS; SUBCUTANEOUS at 06:50

## 2023-05-15 ASSESSMENT — ACTIVITIES OF DAILY LIVING (ADL)
ADLS_ACUITY_SCORE: 18

## 2023-05-15 NOTE — PLAN OF CARE
Alert and oriented x 4. VSS on RA. Denies N/V, mild SOB upon exertion. On continuous pulse ox and oxygen 2l nasal cannula. Pain rated 8/10; given oxy x1 and Dilaudid x1. Continues Ketamine drip running at 2mg/hr. One loose stool and provider paged. Bowel meds were held. Possible stool panel to be ordered in AM if stools persist after break from Miralax. No acute events. Adequate appetite and intake. No acute events. Will continue plan of care.

## 2023-05-15 NOTE — PLAN OF CARE
"Goal Outcome Evaluation:                    Time 0700- 1530:     Blood pressure 114/54, pulse 96, temperature 99  F (37.2  C), temperature source Oral, resp. rate 18, height 1.626 m (5' 4\"), weight 72.2 kg (159 lb 3.2 oz), SpO2 94 %, not currently breastfeeding.      Pt alert and oriented x4. Agitated presentation. VSS. O2 via nasal cannula 2 LPM at start of shift. Pt attempted to be weaned off o2 with no O2 needed at 1430, spo2 93% on RA.O2 89% on RA at 1500, 1 LPM implemented via nasal cannula. VSS. Ketamine drip in place in AM, discontinued around noon. Port cap changed per unit policy. Loose watery stools reported, physician states to continue to monitor. Contact precautions maintained. Expiratory wheezing noted, dx of asthma noted. Pt refuses both inhalers this morning. RT Neb treatments in place. Physician states goal to discharge by tomorrow morning. No further concerns this shift. Nursing will continue to monitor.   "

## 2023-05-15 NOTE — PROGRESS NOTES
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 and recurrent vaso-occlusive crises  # Acute pain crisis  # History of CVA managed with transfusions, complicated by iron overload  # Acute normocytic anemia  - Current pain management plan:   - Ketamine off (per care plan, do not increase further because Jennifer has felt quite poorly with 8mg/hr in the past)   - Currently on 20mg oxycodone q4h PRN, hydromorphone 1mg IV q8h PRN   - Start Toradol q6h scheduled   - Acetaminophen 975mg TID scheduled    - Miralax and senna BID scheduled   - Hematology consulted, appreciate recommendations   - No concern for acute chest at this time, so switch therapeutic enoxaparin to prophylactic dosing   - Don't transfuse without discussing with hematology first   - Continue PTA deferasirox daily  - Continue PTA hydroxyurea    # Acute hypoxic respiratory failure secondary to asthma exacerbation, improving  # Moderate persistent asthma  V/Q scan negative for PE.   - Xopenex nebs q4h scheduled and q2h PRN  - s/p solumedrol IV x1 in the ED; complete a 5-day course of prednisone 40mg daily   - Continue PTA ICS/LABA controller inhaler    # Depression  -  "Continue PTA fluoxetine daily      Diet: Combination Diet Regular Diet Adult    DVT Prophylaxis: Enoxaparin (Lovenox) SQ  Lopez Catheter: Not present  Lines: PRESENT      Port A Cath Single 04/21/21 Left Chest wall-Site Assessment: WDL      Cardiac Monitoring: None  Code Status: Full Code      Clinically Significant Risk Factors                         # Overweight: Estimated body mass index is 27.33 kg/m  as calculated from the following:    Height as of this encounter: 1.626 m (5' 4\").    Weight as of this encounter: 72.2 kg (159 lb 3.2 oz).           Disposition Plan      Expected Discharge Date: 05/16/2023                  Ivy Montanez MD  Hospitalist Service, [address]  Securely message with [employer] (more info)  Text page via [facility] Paging/Directory   See signed in provider for up to date coverage information  ______________________________________________________________________    Interval History   No acute events over night. Jennifer was still in some pain this morning-- hoping to discharge tomorrow morning. Will attempt to wean off oxygen today. Still having some loose stools but did get a dose of stool softener last night per nursing.     Denies shortness of breath. Nursing notes reviewed, and nursing was updated on plan of care.    Physical Exam   Vital Signs: Temp: 99.3  F (37.4  C) Temp src: Oral BP: 110/50 Pulse: 107   Resp: 18 SpO2: 94 % O2 Device: None (Room air) Oxygen Delivery: 2 LPM  Weight: 159 lbs 3.2 oz    GENERAL: The patient is awake and alert, in no acute distress.  HEAD: Atraumatic, normocephalic. Sclerae are white without injection or icterus.  NOSE: Without deformity, bleeding or discharge.   ORAL CAVITY: No swelling or abnormality to the lip or teeth. Oral mucosa is pink and moist.   NECK: No signs of meningismus. No adenopathy is noted. No JVD is seen.   LUNGS: On 2LPM. Lung sounds now clear and equal bilaterally without " wheezing. Good air movement throughout.   ABDOMEN: Soft, nondistended.  PSYCHIATRIC: The patient is oriented x4. Mood and affect are appropriate.     Data     I have personally reviewed the following data over the past 24 hrs:    12.2 (H)  \   6.8 (LL)   / 479 (H)     N/A N/A N/A /  N/A   N/A N/A N/A \

## 2023-05-15 NOTE — PROGRESS NOTES
VSS on 2L via nasal cannula. AOx4. SBA. Pain managed by scheduled meds, x1 PRN  oxycodone and ketamine PCA running at 2mg/hr via port. One loose BM this shift. Voiding spontaneously with adequate output. Pt sleeping between cares. Continue monitoring and follow POC.

## 2023-05-16 VITALS
HEART RATE: 96 BPM | TEMPERATURE: 97.9 F | SYSTOLIC BLOOD PRESSURE: 134 MMHG | WEIGHT: 159.2 LBS | BODY MASS INDEX: 27.18 KG/M2 | DIASTOLIC BLOOD PRESSURE: 60 MMHG | OXYGEN SATURATION: 96 % | HEIGHT: 64 IN | RESPIRATION RATE: 20 BRPM

## 2023-05-16 DIAGNOSIS — D57.00 SICKLE CELL PAIN CRISIS (H): ICD-10-CM

## 2023-05-16 LAB
ERYTHROCYTE [DISTWIDTH] IN BLOOD BY AUTOMATED COUNT: 25.7 % (ref 10–15)
HCT VFR BLD AUTO: 17.9 % (ref 35–47)
HGB BLD-MCNC: 6.1 G/DL (ref 11.7–15.7)
MCH RBC QN AUTO: 28.5 PG (ref 26.5–33)
MCHC RBC AUTO-ENTMCNC: 34.1 G/DL (ref 31.5–36.5)
MCV RBC AUTO: 84 FL (ref 78–100)
PLATELET # BLD AUTO: 343 10E3/UL (ref 150–450)
RBC # BLD AUTO: 2.14 10E6/UL (ref 3.8–5.2)
WBC # BLD AUTO: 13 10E3/UL (ref 4–11)

## 2023-05-16 PROCEDURE — 99239 HOSP IP/OBS DSCHRG MGMT >30: CPT | Performed by: STUDENT IN AN ORGANIZED HEALTH CARE EDUCATION/TRAINING PROGRAM

## 2023-05-16 PROCEDURE — 85027 COMPLETE CBC AUTOMATED: CPT | Performed by: STUDENT IN AN ORGANIZED HEALTH CARE EDUCATION/TRAINING PROGRAM

## 2023-05-16 PROCEDURE — 250N000011 HC RX IP 250 OP 636: Performed by: STUDENT IN AN ORGANIZED HEALTH CARE EDUCATION/TRAINING PROGRAM

## 2023-05-16 PROCEDURE — 250N000013 HC RX MED GY IP 250 OP 250 PS 637

## 2023-05-16 PROCEDURE — 250N000009 HC RX 250: Performed by: STUDENT IN AN ORGANIZED HEALTH CARE EDUCATION/TRAINING PROGRAM

## 2023-05-16 PROCEDURE — 250N000013 HC RX MED GY IP 250 OP 250 PS 637: Performed by: STUDENT IN AN ORGANIZED HEALTH CARE EDUCATION/TRAINING PROGRAM

## 2023-05-16 PROCEDURE — 250N000013 HC RX MED GY IP 250 OP 250 PS 637: Performed by: INTERNAL MEDICINE

## 2023-05-16 PROCEDURE — 999N000157 HC STATISTIC RCP TIME EA 10 MIN

## 2023-05-16 PROCEDURE — 94640 AIRWAY INHALATION TREATMENT: CPT

## 2023-05-16 RX ORDER — OXYCODONE HYDROCHLORIDE 15 MG/1
15 TABLET ORAL EVERY 4 HOURS PRN
Qty: 25 TABLET | Refills: 0 | Status: SHIPPED | OUTPATIENT
Start: 2023-05-16 | End: 2023-05-22

## 2023-05-16 RX ORDER — HEPARIN SODIUM (PORCINE) LOCK FLUSH IV SOLN 100 UNIT/ML 100 UNIT/ML
3-5 SOLUTION INTRAVENOUS
Status: DISCONTINUED | OUTPATIENT
Start: 2023-05-16 | End: 2023-05-16 | Stop reason: HOSPADM

## 2023-05-16 RX ADMIN — PANTOPRAZOLE SODIUM 40 MG: 40 TABLET, DELAYED RELEASE ORAL at 10:43

## 2023-05-16 RX ADMIN — HYDROMORPHONE HYDROCHLORIDE 1 MG: 1 INJECTION, SOLUTION INTRAMUSCULAR; INTRAVENOUS; SUBCUTANEOUS at 00:03

## 2023-05-16 RX ADMIN — KETOROLAC TROMETHAMINE 30 MG: 30 INJECTION, SOLUTION INTRAMUSCULAR; INTRAVENOUS at 00:10

## 2023-05-16 RX ADMIN — FLUOXETINE HYDROCHLORIDE 10 MG: 10 CAPSULE ORAL at 10:43

## 2023-05-16 RX ADMIN — KETOROLAC TROMETHAMINE 30 MG: 30 INJECTION, SOLUTION INTRAMUSCULAR; INTRAVENOUS at 06:11

## 2023-05-16 RX ADMIN — Medication 5 ML: at 10:23

## 2023-05-16 RX ADMIN — Medication 5 ML: at 06:18

## 2023-05-16 RX ADMIN — ACETAMINOPHEN 975 MG: 325 TABLET ORAL at 10:43

## 2023-05-16 RX ADMIN — LEVALBUTEROL HYDROCHLORIDE 1.25 MG: 1.25 SOLUTION RESPIRATORY (INHALATION) at 07:57

## 2023-05-16 RX ADMIN — HYDROXYUREA 3000 MG: 500 CAPSULE ORAL at 10:44

## 2023-05-16 ASSESSMENT — ACTIVITIES OF DAILY LIVING (ADL)
ADLS_ACUITY_SCORE: 18

## 2023-05-16 NOTE — PROGRESS NOTES
Reviewed discharge medications and orders and she verbalized understanding. She has a follow up clinic appointment and phone numbers to call.

## 2023-05-16 NOTE — PLAN OF CARE
"Goal Outcome Evaluation:    2300 - 0730    Time    /51 (BP Location: Left arm)   Pulse 88   Temp 98.3  F (36.8  C) (Oral)   Resp 16   Ht 1.626 m (5' 4\")   Wt 72.2 kg (159 lb 3.2 oz)   SpO2 96%   BMI 27.33 kg/m      Reason for admission: pain crisis, SOB/acute hypoxia d/t asthma exacerbation  Activity: independent  Pain: 9/10 lower back pain. Toradol and dilaudid administered  Neuro: Alert and oriented. Denies dizziness  Cardiac: tachycardic and soft BP Denies chest pain  Respiratory: denies SOB, Spo2 < 88 % patient was educated on the need for NC O2. She agreed using the pulse oximetry  GI/: LBM 5/15, voids spontaneously  Diet: regular  Lines: Port, CDI  Wounds: none  Labs/imaging: please review lab in the chart            "

## 2023-05-16 NOTE — PLAN OF CARE
Physical Therapy Discharge Summary    Reason for therapy discharge:    Discharged to home with outpatient therapy.    Progress towards therapy goal(s). See goals on Care Plan in [facility] electronic health record for goal details.  Goals partially met.  Barriers to achieving goals:   discharge from facility.    Therapy recommendation(s):    Continued therapy is recommended.  Rationale/Recommendations:  Pt is functioning near her baseline for functional mobility. Recommending OP PT to improve functional mobility, R side strengthening, and dynamic balance training.

## 2023-05-16 NOTE — TELEPHONE ENCOUNTER
Narcotic Refill Request    Medication(s) requested:  Oxycodone  Person Requesting Refill:Jennifer  What pain is the medication treating: sickle cell pain  How is the medication being taken?:1 tablet every 4hours  Does pt have enough for today? Out by today  Is pain being adequately controlled on the current regimen?: Pt is in hospital and requires intermittent IVF/Pain infusions  Experiencing any side effects from medication?: denies    Date of most recent appointment:  Being discharged from hospital today. Last outpt on 5/8/23 with Patricia Mantilla CNP  Any No Show Visits:none recently  Next appointment:   6/5/23   Last fill date and by whom:  Patricia Mantilla PMP Reviewed:      Routed provider: Patricia mantilla CNP

## 2023-05-16 NOTE — PLAN OF CARE
Goal Outcome Evaluation:  Given Dilaudid, Oxycodone, Toradol, and Tylenol for back pain with some relief. Plan is to discharge tomorrow. Ketamine drip discontinued. She declined the continuous pulse oximetry. Up independently.

## 2023-05-17 ENCOUNTER — INFUSION THERAPY VISIT (OUTPATIENT)
Dept: ONCOLOGY | Facility: CLINIC | Age: 23
End: 2023-05-17
Attending: REGISTERED NURSE
Payer: COMMERCIAL

## 2023-05-17 ENCOUNTER — PATIENT OUTREACH (OUTPATIENT)
Dept: CARE COORDINATION | Facility: CLINIC | Age: 23
End: 2023-05-17
Payer: COMMERCIAL

## 2023-05-17 ENCOUNTER — NURSE TRIAGE (OUTPATIENT)
Dept: ONCOLOGY | Facility: CLINIC | Age: 23
End: 2023-05-17
Payer: COMMERCIAL

## 2023-05-17 VITALS
HEART RATE: 103 BPM | DIASTOLIC BLOOD PRESSURE: 75 MMHG | OXYGEN SATURATION: 90 % | SYSTOLIC BLOOD PRESSURE: 120 MMHG | TEMPERATURE: 98.7 F | RESPIRATION RATE: 16 BRPM

## 2023-05-17 DIAGNOSIS — G81.10 SPASTIC HEMIPLEGIA, UNSPECIFIED ETIOLOGY, UNSPECIFIED LATERALITY (H): Primary | ICD-10-CM

## 2023-05-17 DIAGNOSIS — D57.00 SICKLE CELL PAIN CRISIS (H): ICD-10-CM

## 2023-05-17 DIAGNOSIS — D57.00 SICKLE CELL PAIN CRISIS (H): Primary | ICD-10-CM

## 2023-05-17 LAB
ACANTHOCYTES BLD QL SMEAR: SLIGHT
ANION GAP SERPL CALCULATED.3IONS-SCNC: 10 MMOL/L (ref 7–15)
BASOPHILS # BLD AUTO: 0.2 10E3/UL (ref 0–0.2)
BASOPHILS NFR BLD AUTO: 1 %
BUN SERPL-MCNC: 6.5 MG/DL (ref 6–20)
CALCIUM SERPL-MCNC: 8.9 MG/DL (ref 8.6–10)
CHLORIDE SERPL-SCNC: 110 MMOL/L (ref 98–107)
CREAT SERPL-MCNC: 0.51 MG/DL (ref 0.51–0.95)
DEPRECATED HCO3 PLAS-SCNC: 19 MMOL/L (ref 22–29)
EOSINOPHIL # BLD AUTO: 0.9 10E3/UL (ref 0–0.7)
EOSINOPHIL NFR BLD AUTO: 7 %
ERYTHROCYTE [DISTWIDTH] IN BLOOD BY AUTOMATED COUNT: 25.7 % (ref 10–15)
GFR SERPL CREATININE-BSD FRML MDRD: >90 ML/MIN/1.73M2
GLUCOSE SERPL-MCNC: 93 MG/DL (ref 70–99)
HCT VFR BLD AUTO: 18.3 % (ref 35–47)
HGB BLD-MCNC: 6.7 G/DL (ref 11.7–15.7)
IMM GRANULOCYTES # BLD: 0.1 10E3/UL
IMM GRANULOCYTES NFR BLD: 1 %
LYMPHOCYTES # BLD AUTO: 1.7 10E3/UL (ref 0.8–5.3)
LYMPHOCYTES NFR BLD AUTO: 13 %
MCH RBC QN AUTO: 28.2 PG (ref 26.5–33)
MCHC RBC AUTO-ENTMCNC: 36.6 G/DL (ref 31.5–36.5)
MCV RBC AUTO: 77 FL (ref 78–100)
MONOCYTES # BLD AUTO: 1.1 10E3/UL (ref 0–1.3)
MONOCYTES NFR BLD AUTO: 8 %
NEUTROPHILS # BLD AUTO: 9.6 10E3/UL (ref 1.6–8.3)
NEUTROPHILS NFR BLD AUTO: 70 %
NRBC # BLD AUTO: 0.5 10E3/UL
NRBC BLD AUTO-RTO: 4 /100
PLAT MORPH BLD: ABNORMAL
PLATELET # BLD AUTO: 536 10E3/UL (ref 150–450)
POLYCHROMASIA BLD QL SMEAR: SLIGHT
POTASSIUM SERPL-SCNC: 4 MMOL/L (ref 3.4–5.3)
RBC # BLD AUTO: 2.38 10E6/UL (ref 3.8–5.2)
RBC MORPH BLD: ABNORMAL
RETICS # AUTO: 0.33 10E6/UL (ref 0.03–0.1)
RETICS/RBC NFR AUTO: 13.9 % (ref 0.5–2)
SICKLE CELLS BLD QL SMEAR: SLIGHT
SODIUM SERPL-SCNC: 139 MMOL/L (ref 136–145)
TARGETS BLD QL SMEAR: SLIGHT
WBC # BLD AUTO: 13.5 10E3/UL (ref 4–11)

## 2023-05-17 PROCEDURE — 258N000003 HC RX IP 258 OP 636: Performed by: PEDIATRICS

## 2023-05-17 PROCEDURE — 250N000011 HC RX IP 250 OP 636: Performed by: PEDIATRICS

## 2023-05-17 PROCEDURE — 85025 COMPLETE CBC W/AUTO DIFF WBC: CPT | Performed by: PEDIATRICS

## 2023-05-17 PROCEDURE — 96376 TX/PRO/DX INJ SAME DRUG ADON: CPT

## 2023-05-17 PROCEDURE — 85045 AUTOMATED RETICULOCYTE COUNT: CPT | Performed by: PEDIATRICS

## 2023-05-17 PROCEDURE — 96361 HYDRATE IV INFUSION ADD-ON: CPT

## 2023-05-17 PROCEDURE — 82435 ASSAY OF BLOOD CHLORIDE: CPT | Performed by: PEDIATRICS

## 2023-05-17 PROCEDURE — 250N000013 HC RX MED GY IP 250 OP 250 PS 637: Performed by: PEDIATRICS

## 2023-05-17 PROCEDURE — 96365 THER/PROPH/DIAG IV INF INIT: CPT

## 2023-05-17 PROCEDURE — 96375 TX/PRO/DX INJ NEW DRUG ADDON: CPT

## 2023-05-17 RX ORDER — DIPHENHYDRAMINE HCL 25 MG
25 CAPSULE ORAL
Status: CANCELLED
Start: 2023-07-01

## 2023-05-17 RX ORDER — ALBUTEROL SULFATE 0.83 MG/ML
2.5 SOLUTION RESPIRATORY (INHALATION)
Status: CANCELLED | OUTPATIENT
Start: 2023-05-31

## 2023-05-17 RX ORDER — MEPERIDINE HYDROCHLORIDE 25 MG/ML
25 INJECTION INTRAMUSCULAR; INTRAVENOUS; SUBCUTANEOUS EVERY 30 MIN PRN
Status: CANCELLED | OUTPATIENT
Start: 2023-05-31

## 2023-05-17 RX ORDER — EPINEPHRINE 1 MG/ML
0.3 INJECTION, SOLUTION INTRAMUSCULAR; SUBCUTANEOUS EVERY 5 MIN PRN
Status: CANCELLED | OUTPATIENT
Start: 2023-05-31

## 2023-05-17 RX ORDER — HEPARIN SODIUM (PORCINE) LOCK FLUSH IV SOLN 100 UNIT/ML 100 UNIT/ML
5 SOLUTION INTRAVENOUS
Status: CANCELLED | OUTPATIENT
Start: 2023-07-01

## 2023-05-17 RX ORDER — DIPHENHYDRAMINE HYDROCHLORIDE 50 MG/ML
50 INJECTION INTRAMUSCULAR; INTRAVENOUS
Status: CANCELLED
Start: 2023-05-31

## 2023-05-17 RX ORDER — HEPARIN SODIUM (PORCINE) LOCK FLUSH IV SOLN 100 UNIT/ML 100 UNIT/ML
5 SOLUTION INTRAVENOUS
Status: CANCELLED | OUTPATIENT
Start: 2023-05-31

## 2023-05-17 RX ORDER — ALBUTEROL SULFATE 90 UG/1
1-2 AEROSOL, METERED RESPIRATORY (INHALATION)
Status: CANCELLED
Start: 2023-05-31

## 2023-05-17 RX ORDER — HEPARIN SODIUM (PORCINE) LOCK FLUSH IV SOLN 100 UNIT/ML 100 UNIT/ML
5 SOLUTION INTRAVENOUS
Status: DISCONTINUED | OUTPATIENT
Start: 2023-05-17 | End: 2023-05-17 | Stop reason: HOSPADM

## 2023-05-17 RX ORDER — HEPARIN SODIUM,PORCINE 10 UNIT/ML
5 VIAL (ML) INTRAVENOUS
Status: CANCELLED | OUTPATIENT
Start: 2023-07-01

## 2023-05-17 RX ORDER — ONDANSETRON 4 MG/1
8 TABLET, FILM COATED ORAL
Status: CANCELLED
Start: 2023-07-01

## 2023-05-17 RX ORDER — METHYLPREDNISOLONE SODIUM SUCCINATE 125 MG/2ML
125 INJECTION, POWDER, LYOPHILIZED, FOR SOLUTION INTRAMUSCULAR; INTRAVENOUS
Status: CANCELLED
Start: 2023-05-31

## 2023-05-17 RX ORDER — ONDANSETRON 8 MG/1
8 TABLET, ORALLY DISINTEGRATING ORAL
Status: DISCONTINUED | OUTPATIENT
Start: 2023-05-17 | End: 2023-05-17 | Stop reason: HOSPADM

## 2023-05-17 RX ORDER — HEPARIN SODIUM,PORCINE 10 UNIT/ML
5 VIAL (ML) INTRAVENOUS
Status: CANCELLED | OUTPATIENT
Start: 2023-05-31

## 2023-05-17 RX ORDER — DIPHENHYDRAMINE HCL 25 MG
25 CAPSULE ORAL
Status: COMPLETED | OUTPATIENT
Start: 2023-05-17 | End: 2023-05-17

## 2023-05-17 RX ADMIN — DIPHENHYDRAMINE HYDROCHLORIDE 25 MG: 25 CAPSULE ORAL at 10:51

## 2023-05-17 RX ADMIN — SODIUM CHLORIDE 361 MG: 9 INJECTION, SOLUTION INTRAVENOUS at 11:58

## 2023-05-17 RX ADMIN — HYDROMORPHONE HYDROCHLORIDE 1 MG: 1 INJECTION, SOLUTION INTRAMUSCULAR; INTRAVENOUS; SUBCUTANEOUS at 13:11

## 2023-05-17 RX ADMIN — SODIUM CHLORIDE 250 ML: 9 INJECTION, SOLUTION INTRAVENOUS at 11:59

## 2023-05-17 RX ADMIN — HYDROMORPHONE HYDROCHLORIDE 1 MG: 1 INJECTION, SOLUTION INTRAMUSCULAR; INTRAVENOUS; SUBCUTANEOUS at 11:53

## 2023-05-17 RX ADMIN — Medication 5 ML: at 13:40

## 2023-05-17 RX ADMIN — HYDROMORPHONE HYDROCHLORIDE 1 MG: 1 INJECTION, SOLUTION INTRAMUSCULAR; INTRAVENOUS; SUBCUTANEOUS at 10:52

## 2023-05-17 RX ADMIN — SODIUM CHLORIDE, POTASSIUM CHLORIDE, SODIUM LACTATE AND CALCIUM CHLORIDE 1000 ML: 600; 310; 30; 20 INJECTION, SOLUTION INTRAVENOUS at 10:49

## 2023-05-17 NOTE — PROGRESS NOTES
Infusion Nursing Note:  Jennifer Cervantes presents today for IV Fluids/Pain Meds/Jr.    Patient seen by provider today: No   present during visit today: Not Applicable.    Note: Jennifer was discharged from the hospital yesterday. She denied any new concerns overnight. No fevers, chills, cough, or SOB. Denied chest pain.    Upon arrival to infusion, O2 sats were 88-89% on room air. Patient felt asymptomatic. Encouraged deep breathing and oxygen saturation increased to 90-92%. Patricia Mantilla CNP notified. Encouraged patient to continue to monitor her oxygen levels at home. She reported her mom has an oximeter she can borrow. Reviewed that she should present to ED if she develops SOB or oxygen saturations that are less than 90%. Patient verbalized understanding.    Reported 9/10 lower back pain upon arrival to infusion. Received IV dilaudid x 3. Patient reported 5/10 lower back pain at time of discharge. She felt comfortable to discharge home to continue to manage her pain.    Patient is due for jr infusion today.    Hgb 6.7 today.    TORB Patricia Mantilla CNP/Tammie Hale RN 05/17/23 @ 1127  - continue to encourage deep breathing and place patient on oxygen while in infusion if O2 sats are less than 90  - patient should monitor her oxygen level at home and present to ED with decreased oxygenation or if she develops symptoms  - continue to monitor hgb level  - okay for patient to get jr infusion today despite recent hospitalization    Intravenous Access:  Implanted Port.    Treatment Conditions:  Biological Infusion Checklist:  ~~~ NOTE: If the patient answers yes to any of the questions below, hold the infusion and contact ordering provider or on-call provider.    1. Have you recently had an elevated temperature, fever, chills, productive cough, coughing for 3 weeks or longer or hemoptysis, abnormal vital signs, night sweats,  chest pain or have you noticed a decrease in your appetite, unexplained weight loss  or fatigue? No  2. Do you have any open wounds or new incisions? No  3. Do you have any recent or upcoming hospitalizations, surgeries or dental procedures? No  4. Do you currently have or recently have had any signs of illness or infection or are you on any antibiotics? No  5. Have you had any new, sudden or worsening abdominal pain? No  6. Have you or anyone in your household received a live vaccination in the past 4 weeks? Please note:  No live vaccines while on biologic/chemotherapy until 6 months after the last treatment.  Patient can receive the flu vaccine (shot only) and the pneumovax.  It is optimal for the patient to get these vaccines mid cycle, but they can be given at any time as long as it is not on the day of the infusion. No  7. Have you recently been diagnosed with any new nervous system diseases (ie. Multiple sclerosis, Guillain [place], seizures, neurological changes) or cancer diagnosis? No  8. Are you on any form of radiation or chemotherapy? No  9. Are you pregnant or breast feeding or do you have plans of pregnancy in the future? No  10. Have you been having any signs of worsening depression or suicidal ideations?  (benlysta only) No  11. Have there been any other new onset medical symptoms? No        Post Infusion Assessment:  Patient tolerated infusion without incident.  Patient completed 30 minutes observation after sidney infusion per protocol.  Blood return noted pre and post infusion.  Site patent and intact, free from redness, edema or discomfort.  No evidence of extravasations.  Access discontinued per protocol.       Discharge Plan:   Discharge instructions reviewed with: Patient.  Patient and/or family verbalized understanding of discharge instructions and all questions answered.  AVS to patient via [employer].  Patient will return 6/15 for next appointment.   Patient discharged in stable condition accompanied by: self.  Departure Mode: Ambulatory.      Earnestine Hale RN

## 2023-05-17 NOTE — PROGRESS NOTES
[facility]    Background: Transitional Care Management program identified per system criteria and reviewed by [facility] team for possible outreach.    Assessment: Upon chart review, CCR Team member will not proceed with patient outreach related to this episode of Transitional Care Management program due to reason below:    Patient has active communication with a nurse, provider or care team for reason of post-hospital follow up plan.  Outreach call by CCR team not indicated to minimize duplicative efforts.     Plan: Transitional Care Management episode addressed appropriately per reason noted above.      Sherlyn De La Vega  Community Health Worker  [facility] [place]  [phone]      *Connected Care Resource Team does NOT follow patient ongoing. Referrals are identified based on internal discharge reports and the outreach is to ensure patient has an understanding of their discharge instructions.

## 2023-05-17 NOTE — DISCHARGE SUMMARY
"[facility]  Hospitalist Discharge Summary      Date of Admission:  5/10/2023  Date of Discharge:  5/16/2023 10:57 AM  Discharging Provider: Jamaal Garcia MD  Discharge Service: Hospitalist Service, GOLD TEAM 8    Discharge Diagnoses   HbSS disease with recurrent vaso-occlusive crises  Acute pain crisis  History of CVA  Acute normocytic anemia  Acute hypoxic respiratory failure secondary to asthma exacerbation  Moderate persistent asthma  Depression    Clinically Significant Risk Factors     # Overweight: Estimated body mass index is 27.33 kg/m  as calculated from the following:    Height as of this encounter: 1.626 m (5' 4\").    Weight as of this encounter: 72.2 kg (159 lb 3.2 oz).       Follow-ups Needed After Discharge   Follow-up Appointments     Follow Up ([facility])      Follow up with primary care provider, Suraj Case, within 7 days   for hospital follow- up.  No follow up labs or test are needed.    Follow up as scheduled with Dr. Mantilla in hematology clinic on 6/5/23.      Appointments on [place] and/or [facility] (with [facility] or [facility]   provider or service). Call [phone] if you haven't heard regarding   these appointments within 7 days of discharge.             Unresulted Labs Ordered in the Past 30 Days of this Admission     No orders found from 4/10/2023 to 5/11/2023.      These results will be followed up by N/A    Discharge Disposition   Discharged to home  Condition at discharge: Stable    Hospital Course   Jennifer Cervantes is a 23-year-old F with a history of HbSS disease complicated by acute chest syndrome (last in 09/2022), CVA, and iron overload secondary to frequent transfusions who presented to the ED on 5/10 due to acute sickle cell pain crisis and shortness of breath. The following issues were addressed during her hospitalization:     # HbSS disease complicated by recurrent vaso-occlusive crises  # Acute pain crisis  # History of CVA " managed with transfusions, complicated by iron overload  # Acute normocytic anemia  Pt presented with acute pain crisis, no concern for acute chest on this admission.  Hematology consulted. Initial management per care plan with ketamine, PO oxycodone, IV hydromorphone, scheduled APAP, ketamine 2mg/hr.  Pain improved over subsequent days and IV pain control slowly weaned.  Ketamine discontinued on 5/15, pt feeling well enough to return home on 5/16.  Deferasirox and hydroxyurea continued throughout hospitalization.  Hgb 6.1-7.2 during this hospitalization, near baseline, no indication for transfusion.  Patient has follow-up already arranged with Hematology on 6/5/2023.     # Acute hypoxic respiratory failure secondary to asthma exacerbation, improving  # Moderate persistent asthma  V/Q scan negative for PE. Pt had oxygen requirement at the time of admission, as well as mild wheezing and perceived dyspnea. Received methylprednisolone in the ED followed by 5d course of prednisone 40mg daily for acute asthma exacerbation,  scheduled+PRN levalbuterol nebs.  PTA ICS/LABA continued throughout admission.    # Depression  - Continued PTA fluoxetine daily     Consultations This Hospital Stay   PHYSICAL THERAPY ADULT IP CONSULT  PHARMACY IP CONSULT  HEMATOLOGY ADULT IP CONSULT  IP RESPIRATORY CARE CHRONIC PULMONARY DISEASE SPECIALIST    Code Status   Prior    Time Spent on this Encounter   I, Jamaal Garcia MD, personally saw the patient today and spent greater than 30 minutes discharging this patient.       Jamala Garcia MD  [facility] [address] [place] [id number]  Phone: [phone]  ______________________________________________________________________    Physical Exam   Vital Signs: Temp: 97.9  F (36.6  C) Temp src: Oral BP: 134/60 Pulse: 96   Resp: 20 SpO2: 96 % O2 Device: None (Room air)    Weight: 159 lbs 3.2 oz    GENERAL: The patient is awake and alert, in no acute  distress.  HEENT: AT/NC. Sclerae are white without injection or icterus.  Nares patent without discharge or bleeding.  Oral mucosa moist without oral lesions.   NECK: Supple. No adenopathy is noted. No JVD is seen.   LUNGS: On RA. Scant wheezes scattered but good air movement bilaterally, no crackles or rales.   ABDOMEN: Soft, nondistended.  EXTREMITIES: R arm with limited movement, hand curled in with rigidity due to hx of stroke  PSYCHIATRIC: The patient is oriented x4. Mood and affect are appropriate.          Primary Care Physician   Suraj Case    Discharge Orders      Reason for your hospital stay    You were in the hospital for management of sickle cell disease vaso-occlusive pain crisis and an asthma exacerbation.     Activity    Your activity upon discharge: activity as tolerated     Follow Up ([facility])    Follow up with primary care provider, Suraj Case, within 7 days for hospital follow- up.  No follow up labs or test are needed.    Follow up as scheduled with Dr. Mantilla in hematology clinic on 6/5/23.      Appointments on [place] and/or [facility] (with [facility] or [facility] provider or service). Call [phone] if you haven't heard regarding these appointments within 7 days of discharge.     Diet    Follow this diet upon discharge: Orders Placed This Encounter      Combination Diet Regular Diet Adult       Significant Results and Procedures   Most Recent 3 CBC's:Recent Labs   Lab Test 05/16/23  0610 05/15/23  0503 05/14/23  0633   WBC 13.0* 12.2* 19.4*   HGB 6.1* 6.8* 7.2*   MCV 84 87 87    479* 511*     Most Recent 3 BMP's:Recent Labs   Lab Test 05/13/23  0605 05/12/23  0731 05/11/23  0648    141 139   POTASSIUM 4.2 4.3 4.3   CHLORIDE 112* 111* 109*   CO2 20* 20* 19*   BUN 10.3 8.7 6.3   CR 0.62 0.67 0.53   ANIONGAP 9 10 11   MICAH 8.1* 8.3* 8.9   GLC 96 100* 137*     Most Recent 2 LFT's:Recent Labs   Lab Test 05/14/23  0633 05/13/23  0605 05/12/23  0731 05/11/23  0648  05/10/23  1948   AST  --   --   --  27 33   ALT  --   --   --  12 14   ALKPHOS  --   --   --  71 76   BILITOTAL 1.5* 1.6*   < > 1.8* 3.2*    < > = values in this interval not displayed.     Most Recent Urinalysis:Recent Labs   Lab Test 01/09/23  0633   COLOR Yellow   APPEARANCE Clear   URINEGLC Negative   URINEBILI Negative   URINEKETONE Negative   SG 1.014   UBLD Negative   URINEPH 5.5   PROTEIN Negative   NITRITE Negative   LEUKEST Negative   RBCU 1   WBCU 3     Most Recent ESR & CRP:Recent Labs   Lab Test 05/10/23  2237 05/10/23  1948 08/29/22  1130 07/10/21  0339 05/10/21  0442   SED  --   --  21*   < >  --    CRP  --   --   --   --  76.0*   CRPI 17.10*   < >  --   --   --     < > = values in this interval not displayed.   ,   Results for orders placed or performed during the hospital encounter of 05/10/23   XR Chest Port 1 View    Narrative    EXAM: Chest radiograph 5/10/2023 7:44 PM    HISTORY: 23 years Female dyspnea.     COMPARISON: Chest radiograph 5/8/2023.    TECHNIQUE: Frontal semiupright radiograph of the chest.    FINDINGS:   Left chest wall Port-A-Cath with distal tip in the low SVC. Trachea is  midline. Normal cardiac mediastinal silhouette. Poor inspiration.  Trace left costophrenic angle blunting, stable compared to prior exam  on 5/8/2023. No discernible pneumothorax. No focal airspace or  interstitial opacities. Mild asymmetric elevation of the right  hemidiaphragm. Right upper quadrant surgical clip. No acute or  suspicious osseous abnormality. Unremarkable soft tissues.      Impression    IMPRESSION:   1. Trace left pleural effusion, unchanged from prior exam.  2. No acute pulmonary consolidation or significant airspace disease.    I have personally reviewed the examination and initial interpretation  and I agree with the findings.    KEN BARBOUR DO         SYSTEM ID:  [id number]   Lung vent and perf (NM)    Narrative    Examination:NM LUNG SCAN VENTILATION AND PERFUSION, 5/11/2023 11:36  AM      Indication:  dyspnea, chest tightness, contrast allergy     Technique:    The patient received 63 mCi of Tc-99m DTPA aerosol for ventilation and  7.2 mCi of Tc-99m MAA for perfusion. Multiple images were obtained of  the lungs in Anterior, posterior, RICHTER, RPO, LPO, and  Maori projections.    Comparison: 5/11/2023 chest CT, 5/10/2023 chest x-ray    Findings:    There are no areas within the lung parenchyma that indicative a  ventilation/perfusion mismatch.      Impression    Impression:    No ventilation/perfusion mismatches to indicate pulmonary emboli.    I have personally reviewed the examination and initial interpretation  and I agree with the findings.    RAJ VIDES MD         SYSTEM ID:  [id number]   US Lower Extremity Venous Bilateral Port    Narrative    EXAMINATION: DOPPLER VENOUS ULTRASOUND OF BILATERAL LOWER EXTREMITIES,  5/10/2023 11:47 PM     COMPARISON: Ultrasound 9/5/2022    HISTORY: Concerns for VTE    TECHNIQUE:  Gray-scale evaluation with compression, spectral flow and  color Doppler assessment of the deep venous system of both legs from  groin to knee, and then at the ankles.    FINDINGS:  In both lower extremities, the common femoral, femoral, popliteal and  posterior tibial veins demonstrate normal compressibility and blood  flow.      Impression    IMPRESSION:  No evidence of deep venous thrombosis in either lower extremity.    I have personally reviewed the examination and initial interpretation  and I agree with the findings.    MARY JANE JUÁREZ MD         SYSTEM ID:  [id number]   CT Chest w/o Contrast    Narrative    CT chest without contrast indication: Hypoxia and cough    COMPARISON: Portable chest radiograph 5/10/2023. Prior chest CT  pulmonary angiogram study dated 9/19/2022    FINDINGS: No contrast. The included thyroid appears unremarkable.  Breast tissues appear grossly unremarkable. Left-sided port noted with  the proximal aspect of the catheter incompletely imaged but  distally  the catheter is seen to progress into the distal SVC. Heart size  normal. Small left pleural effusion. Mild bibasilar pleural  thickening. Atrophic spleen which is markedly hyperdense and unchanged  in size. Cholecystectomy. No adrenal nodule. The liver is also  hyperdense, as is the spleen, consistent with iron deposition.  No enlarged lymph nodes in the chest. There are nonenlarged  prevascular and lower paratracheal lymph nodes. Resolution of the  previous right pleural effusion. The left pleural effusion is grossly  unchanged.  Bone detail shows good mineralization throughout. Mild mid thoracic  centered dextrocurvature.  Detail of the lungs shows patchy opacities with interlobular septal  thickening especially in the lower lobes bilaterally but to a lesser  extent in the lingula this is likely representing mild edema. Major  airways are nicely patent. No dominant solid or groundglass focal  pulmonary nodule. Possible 3 mm nodule adjacent to a vessel in the  lingula (series 5 image 123) unchanged from previous.  The previous rounded fluid density area just anterior to the right  apex in the supraclavicular soft tissues has resolved.      Impression    IMPRESSION: Resolution of previous rounded fluid density area in the  right supraclavicular soft tissues. Resolution of right pleural  effusion with residual pleural thickening an mild edema with  interlobular septal thickening consistent with interstitial mild  edema. Unchanged left pleural effusion and left lung base edema.  Unchanged 3 mm possible lingular pulmonary nodule somewhat obscured by  adjacent small pulmonary vessel.. Iron deposition in the spleen and  liver. Cholecystectomy.    MARY JANE JUÁREZ MD         SYSTEM ID:  [id number]     *Note: Due to a large number of results and/or encounters for the requested time period, some results have not been displayed. A complete set of results can be found in Results Review.       Discharge Medications    Discharge Medication List as of 5/16/2023 10:34 AM      CONTINUE these medications which have NOT CHANGED    Details   acetaminophen (TYLENOL) 325 MG tablet Take 2 tablets (650 mg) by mouth every 6 hours as needed for mild pain, Disp-120 tablet, R-3, E-Prescribe      albuterol (PROAIR HFA/PROVENTIL HFA/VENTOLIN HFA) 108 (90 Base) MCG/ACT inhaler Inhale 2 puffs into the lungs every 6 hours as needed for shortness of breath or wheezing, Disp-8.5 g, R-3, E-PrescribePharmacy may dispense brand covered by insurance (Proair, or proventil or ventolin or generic albuterol inhaler)      albuterol (PROVENTIL) (2.5 MG/3ML) 0.083% neb solution Take 2 vials (5 mg) by nebulization every 6 hours as needed for shortness of breath or wheezing, Disp-90 mL, R-3, E-Prescribe      aspirin (ASA) 81 MG chewable tablet Take 1 tablet (81 mg) by mouth 2 times daily, Disp-60 tablet, R-11, E-Prescribe      budesonide-formoterol (SYMBICORT) 160-4.5 MCG/ACT Inhaler Inhale 2 puffs into the lungs 2 times daily, Disp-10.2 g, R-3, E-Prescribe      deferasirox (JADENU) 360 MG tablet Take 4 tablets (1,440 mg) by mouth every evening, Disp-120 tablet, R-4, E-Prescribe      diclofenac (VOLTAREN) 1 % topical gel Apply 4 g topically 4 times daily, Disp-350 g, R-0, E-Prescribe      diphenhydrAMINE (BENADRYL) 25 MG capsule Take 1 capsule (25 mg) by mouth every 6 hours as needed for itching or allergies, Disp-30 capsule, R-0, E-Prescribe      EPINEPHrine (ANY BX GENERIC EQUIV) 0.3 MG/0.3ML injection 2-pack Inject 0.3 mLs (0.3 mg) into the muscle as needed for anaphylaxis, Disp-1 each, R-1, E-Prescribe      FLUoxetine (PROZAC) 10 MG capsule Take 1 capsule (10 mg) by mouth daily For two weeks, then increase to 20 mg if 10 mg not effective, Disp-40 capsule, R-1, E-Prescribe      folic acid (FOLVITE) 1 MG tablet Take 1 tablet (1 mg) by mouth daily, Disp-30 tablet, R-0, E-Prescribe      Hydroxyurea 1000 MG TABS Take 3,000 mg by mouth daily, Disp-90 tablet, R-3,  E-Prescribe      naloxone (NARCAN) 4 MG/0.1ML nasal spray Spray 4 mg into one nostril alternating nostrils as needed for opioid reversal every 2-3 minutes until assistance arrives, Historical      ondansetron (ZOFRAN) 8 MG tablet Take 1 tablet (8 mg) by mouth every 8 hours as needed, Disp-30 tablet, R-1, E-Prescribe      oxyCODONE IR (ROXICODONE) 15 MG tablet Take 1 tablet (15 mg) by mouth every 4 hours as needed for severe pain Goal 4 per day. Max 6 per day., Disp-25 tablet, R-0, E-Prescribe      traZODone (DESYREL) 50 MG tablet Take 1 tablet (50 mg) by mouth At Bedtime, Disp-30 tablet, R-1, E-Prescribe           Allergies   Allergies   Allergen Reactions     Contrast Dye      Hives and breathing issues     Fish-Derived Products Hives     Seafood Hives     Gadolinium      Iodinated Contrast Media

## 2023-05-17 NOTE — PROGRESS NOTES
Social Work - Transportation  [facility]    Data/Intervention:  Patient Name: Jennifer Cervantes  /Age: 2000 (23 year old)    Call From: Patient called Triage Line  Reason for Call:  support requested for patient transportation needs for 2023.  Assessment:  called [facility] to arrange ride through patient's insurance. [facility] arranged  for patient from home with [employer]. Patient will need to call [phone] when ready for return ride home.  Plan: Patient is aware of the transportation plan.  available to assist with any other needs.  CARLOS Chavez,UDAY  Hematology/Oncology Social Worker  Phone:[phone] Pager: [phone]

## 2023-05-17 NOTE — TELEPHONE ENCOUNTER
Oncology Nurse Triage - Sickle Cell Pain Crisis:    Situation: Jennifer  calling about Sickle Cell Pain Crisis, requesting to be added on for IV fluids and pain medicine    Background:   Patient's last infusion was Hospitalized from 5/10-5/16/2023  Last clinic visit date:5/8/23 with Patricia Mantilla CNP  Does patient have active treatment plan?  Yes      Assessment of Symptoms:  Onset/Duration of symptoms: 1 day    Is it typical sickle cell pain? Yes   Location: lower back  Character: Sharp           Intensity: 8/10    Any radiation of pain, numbness, tingling, weakness, warmth, swelling, discoloration of arms or legs?No     Fever?No  (if yes max temperature recorded in last 24 hours):      Chest Pain Present: No     Shortness of breath: No     Other home therapies tried: heat     Last home medication taken and when: 0600 took oxycodone, and other meds    Any Refills Needed?: No     Does patient have transportation & length of time to get to clinic: No needs transportation assistance. 25-30 min away    Recommendations:   Added to infusion wait list.  Message sent to Patricia Mantilla CNP at 0718--Patricia approved infusion.    If you do not hear from the infusion center by 2pm then you will not be able to get in for an infusion today. If symptoms worsen while waiting for call back, and/or you experience fever, chills, SOB, chest pain, cough, n/v, dizziness, numbness, swelling, discoloration of extremities, then seek emergency evaluation in Emergency Department.     [facility] can offer [address]   Called Jennifer who confirmed she can come to apt.  Message sent to [facility] to schedule.  Message sent to  to arrange transportation.  Updated charge nurse, Henny.    Message routed to Patricia Mantilla CNP and MAURISIO Asencio

## 2023-05-17 NOTE — PATIENT INSTRUCTIONS
Biologic Infusion Post Education:     Call the triage nurse at your clinic ([phone]) or seek medical attention if you have chills and/or temperature greater than or equal to 100.5, uncontrolled nausea/vomiting, diarrhea, constipation, dizziness, shortness of breath, chest pain, heart palpitations, weakness or any other new or concerning symptoms, questions or concerns.    You cannot have any live virus vaccines prior to or during treatment or up to 6 months post infusion.  If you have an upcoming surgery, medical procedure or dental procedure during treatment, this should be discussed with your ordering physician and your surgeon/dentist.    If you are having any concerning symptom, if you are unsure if you should get your next infusion or wish to speak to a provider before your next infusion, please call your care coordinator or triage nurse at your clinic to notify them so we can adequately serve you.

## 2023-05-18 ENCOUNTER — INFUSION THERAPY VISIT (OUTPATIENT)
Dept: TRANSPLANT | Facility: CLINIC | Age: 23
End: 2023-05-18
Attending: PEDIATRICS
Payer: COMMERCIAL

## 2023-05-18 ENCOUNTER — PATIENT OUTREACH (OUTPATIENT)
Dept: CARE COORDINATION | Facility: CLINIC | Age: 23
End: 2023-05-18
Payer: COMMERCIAL

## 2023-05-18 ENCOUNTER — NURSE TRIAGE (OUTPATIENT)
Dept: ONCOLOGY | Facility: CLINIC | Age: 23
End: 2023-05-18
Payer: COMMERCIAL

## 2023-05-18 VITALS
DIASTOLIC BLOOD PRESSURE: 86 MMHG | SYSTOLIC BLOOD PRESSURE: 128 MMHG | RESPIRATION RATE: 20 BRPM | TEMPERATURE: 98.3 F | OXYGEN SATURATION: 91 % | HEART RATE: 92 BPM

## 2023-05-18 DIAGNOSIS — D57.00 SICKLE CELL PAIN CRISIS (H): ICD-10-CM

## 2023-05-18 DIAGNOSIS — G81.10 SPASTIC HEMIPLEGIA, UNSPECIFIED ETIOLOGY, UNSPECIFIED LATERALITY (H): Primary | ICD-10-CM

## 2023-05-18 PROCEDURE — 96376 TX/PRO/DX INJ SAME DRUG ADON: CPT

## 2023-05-18 PROCEDURE — 250N000011 HC RX IP 250 OP 636: Performed by: PEDIATRICS

## 2023-05-18 PROCEDURE — 96361 HYDRATE IV INFUSION ADD-ON: CPT

## 2023-05-18 PROCEDURE — 96374 THER/PROPH/DIAG INJ IV PUSH: CPT

## 2023-05-18 PROCEDURE — 258N000003 HC RX IP 258 OP 636: Performed by: PEDIATRICS

## 2023-05-18 PROCEDURE — 250N000013 HC RX MED GY IP 250 OP 250 PS 637: Performed by: PEDIATRICS

## 2023-05-18 RX ORDER — ONDANSETRON 4 MG/1
8 TABLET, FILM COATED ORAL
Status: CANCELLED
Start: 2023-07-01

## 2023-05-18 RX ORDER — DIPHENHYDRAMINE HCL 25 MG
25 CAPSULE ORAL
Status: CANCELLED
Start: 2023-07-01

## 2023-05-18 RX ORDER — HEPARIN SODIUM (PORCINE) LOCK FLUSH IV SOLN 100 UNIT/ML 100 UNIT/ML
5 SOLUTION INTRAVENOUS
Status: CANCELLED | OUTPATIENT
Start: 2023-07-01

## 2023-05-18 RX ORDER — DIPHENHYDRAMINE HCL 25 MG
25 CAPSULE ORAL
Status: COMPLETED | OUTPATIENT
Start: 2023-05-18 | End: 2023-05-18

## 2023-05-18 RX ORDER — HEPARIN SODIUM,PORCINE 10 UNIT/ML
5 VIAL (ML) INTRAVENOUS
Status: CANCELLED | OUTPATIENT
Start: 2023-07-01

## 2023-05-18 RX ADMIN — SODIUM CHLORIDE, POTASSIUM CHLORIDE, SODIUM LACTATE AND CALCIUM CHLORIDE 1000 ML: 600; 310; 30; 20 INJECTION, SOLUTION INTRAVENOUS at 11:18

## 2023-05-18 RX ADMIN — HYDROMORPHONE HYDROCHLORIDE 1 MG: 1 INJECTION, SOLUTION INTRAMUSCULAR; INTRAVENOUS; SUBCUTANEOUS at 13:19

## 2023-05-18 RX ADMIN — HYDROMORPHONE HYDROCHLORIDE 1 MG: 1 INJECTION, SOLUTION INTRAMUSCULAR; INTRAVENOUS; SUBCUTANEOUS at 12:18

## 2023-05-18 RX ADMIN — HYDROMORPHONE HYDROCHLORIDE 1 MG: 1 INJECTION, SOLUTION INTRAMUSCULAR; INTRAVENOUS; SUBCUTANEOUS at 11:16

## 2023-05-18 RX ADMIN — DIPHENHYDRAMINE HYDROCHLORIDE 25 MG: 25 CAPSULE ORAL at 11:08

## 2023-05-18 NOTE — PROGRESS NOTES
Infusion Nursing Note:  Jennifer Cervantes presents today for add on IVF/pain meds for SCC.    Patient seen by provider today: No   present during visit today: Not Applicable.    Note: No labs/no provider visit. 1L LR bolus given over 2 hours. 25mg PO Benadryl given, and 1mg IVP Dilaudid given Q1HR for max of 3 doses. Patient reported pain improved prior to discharge.    Intravenous Access:  Implanted Port.  +BR noted pre and post infusion  De-accessed prior to discharge.    Treatment Conditions:  Not Applicable.      Post Infusion Assessment:  Patient tolerated infusion without incident.       Discharge Plan:   Patient discharged in stable condition accompanied by: self.      Allison Wiggins RN

## 2023-05-18 NOTE — TELEPHONE ENCOUNTER
Oncology Nurse Triage - Sickle Cell Pain Crisis:    Situation: Jennifer  calling about Sickle Cell Pain Crisis, requesting to be added on for IV fluids and pain medicine    Background:     Patient's last infusion was 5/`7/23   Last clinic visit date:5/8/23 Patricia Mantilla   Does patient have active treatment plan?  Yes      Assessment of Symptoms:  Onset/Duration of symptoms: 2 day    Is it typical sickle cell pain? Yes   Location: hips and legs   Character: Sharp           Intensity: 10/10    Any radiation of pain, numbness, tingling, weakness, warmth, swelling, discoloration of arms or legs?No     Fever?No  (if yes max temperature recorded in last 24 hours):      Chest Pain Present: No     Shortness of breath: No     Other home therapies tried: HEAT/HEATING PAD and WARM BATH     Last home medication taken and when: 6:00am oxycodone     Any Refills Needed?: No     Does patient have transportation & length of time to get to clinic: No  Will need transportation from  set up         Recommendations:     BMT can take Jennifer at 11:30     Call placed to [facility] and she can take the 11:30 appt.     Message sent to  for Transportation     Message sent to [facility] to schedule appt.         If you do not hear from the infusion center by 2pm then you will not be able to get in for an infusion today. If symptoms worsen while waiting for call back, and/or you experience fever, chills, SOB, chest pain, cough, n/v, dizziness, numbness, swelling, discoloration of extremities, then seek emergency evaluation in Emergency Department.     Please note, if you are late for your appt, you risk losing your infusion appt as it may delay another patient's infusion who arrived on time.

## 2023-05-18 NOTE — PROGRESS NOTES
Community Health Worker Note: Telephone Call  Oncology Clinic     Data/Intervention:  Patient Name:  Jennifer Cervantes DOB/Age: [birth year]     Call From: Triage Nurse        Reason for Call:  Transportation      Assessment:    CHW also called [employer]  ([phone] )  to arrange ride through patient's insurance.  [employer] arranged a round trip ride  Patient will need to call when ready for return ride home [phone]. Talked to patient and she will call [employer] to setup  time.     Plan:  Patient is aware of the transportation plan. /CHW available to assist with any other needs.      Isaura OTTO Community Health Worker  Clinic Care Coordination  [facility]  Phone: [phone]

## 2023-05-19 ENCOUNTER — TELEPHONE (OUTPATIENT)
Dept: INTERNAL MEDICINE | Facility: CLINIC | Age: 23
End: 2023-05-19

## 2023-05-19 ENCOUNTER — LAB (OUTPATIENT)
Dept: LAB | Facility: CLINIC | Age: 23
End: 2023-05-19
Payer: COMMERCIAL

## 2023-05-19 ENCOUNTER — OFFICE VISIT (OUTPATIENT)
Dept: INTERNAL MEDICINE | Facility: CLINIC | Age: 23
End: 2023-05-19
Payer: COMMERCIAL

## 2023-05-19 VITALS
BODY MASS INDEX: 26.23 KG/M2 | SYSTOLIC BLOOD PRESSURE: 134 MMHG | OXYGEN SATURATION: 93 % | HEART RATE: 104 BPM | WEIGHT: 152.8 LBS | DIASTOLIC BLOOD PRESSURE: 85 MMHG

## 2023-05-19 DIAGNOSIS — Z30.017 ENCOUNTER FOR INITIAL PRESCRIPTION OF IMPLANTABLE SUBDERMAL CONTRACEPTIVE: Primary | ICD-10-CM

## 2023-05-19 DIAGNOSIS — Z30.9 ENCOUNTER FOR CONTRACEPTIVE MANAGEMENT, UNSPECIFIED TYPE: Primary | ICD-10-CM

## 2023-05-19 DIAGNOSIS — Z30.9 ENCOUNTER FOR CONTRACEPTIVE MANAGEMENT, UNSPECIFIED TYPE: ICD-10-CM

## 2023-05-19 LAB — HCG UR QL: NEGATIVE

## 2023-05-19 PROCEDURE — 11981 INSERTION DRUG DLVR IMPLANT: CPT | Performed by: INTERNAL MEDICINE

## 2023-05-19 PROCEDURE — 81025 URINE PREGNANCY TEST: CPT | Performed by: PATHOLOGY

## 2023-05-19 RX ORDER — LIDOCAINE HYDROCHLORIDE AND EPINEPHRINE 10; 10 MG/ML; UG/ML
5 INJECTION, SOLUTION INFILTRATION; PERINEURAL ONCE
Status: COMPLETED | OUTPATIENT
Start: 2023-05-19 | End: 2023-05-19

## 2023-05-19 RX ADMIN — LIDOCAINE HYDROCHLORIDE AND EPINEPHRINE 5 ML: 10; 10 INJECTION, SOLUTION INFILTRATION; PERINEURAL at 11:11

## 2023-05-19 NOTE — TELEPHONE ENCOUNTER
Patient scheduled for nexplanon today.  Can we check with Emmett re: prior auth/ordering med?    Also, please call patient and let her know she needs to go to lab 30-40 min prior to appt for pregnancy test.  Can you ask when her last depo shot was too?    Thanks,  Zaynab Perez MD  Internal Medicine

## 2023-05-19 NOTE — PATIENT INSTRUCTIONS
NEXPLANON AFTERCARE INSTRUCTIONS   You will need to keep your arm dry and clean for 24 hours. You may have some pain at the site of the Nexplanon insertion. You can help relieve the discomfort with Tylenol (acetaminophen), Aspirin or Advil (ibuprofen). If your discomfort worsens or you notice redness spreading on the skin around the insertion site, please call the clinic.     Irregular bleeding is common with Nexplanon, especially in the first 6-12 months of use. After one year, approximately 20% of women who use Nexplanon will stop having periods completely. Some women have longer, heavier periods. Some women will have increased spotting between periods. You may find that your periods may be hard to predict.     The Nexplanon does not protect against sexually transmitted infections including the AIDS virus (HIV), warts (HPV), gonorrhea, Chlamydia, and herpes. Condoms should be used to decrease the risk sexually transmitted infections. If you think that you have been exposed to a sexually transmitted infection, please call the clinic.     If you had Nexplanon placed for birth control, it is effective immediately if it was inserted within five days after the start of your period. If you have Nexplanon inserted at any other time during your menstrual cycle, use another method of birth control, like condoms for at least 7 days.     The Nexplanon should be removed and/or replaced by a health care provider after three years.   Warning Signs   Call the clinic if any of the following occurs:    You have bleeding, pus, or increasing redness, or pain at insertion site.    You have fever or chills    The implant comes out or you have concerns about its location.    You have a positive pregnancy test or suspect you might be pregnant.

## 2023-05-19 NOTE — NURSING NOTE
Jennifer Cervantes is a 23 year old female that presents in clinic today for the following:     Chief Complaint   Patient presents with     Minor Procedure     Pt here for Nexplanon insertion       The patient's allergies and medications were reviewed. The patient's vitals were obtained without incident. The patient does not have any other questions for the provider.     Lai Michael, EMT at 10:04 AM on 5/19/2023.  Primary Care Clinic: [phone]

## 2023-05-19 NOTE — PROGRESS NOTES
History of Present Illness:  Ms. Cervantes is a 23 year old female who presents for  Chief Complaint   Patient presents with     Minor Procedure     Pt here for Nexplanon insertion       History of SSA, CVA, chronic opioid dependence    Has been on depo shots, last shot late March  Reports irregular periods  Liletta in 2018, then transitioned to depo shots thereafter  States she's been on nexplanon prior, lightly spotting, no other side effects  She reports losing weight on depo, she reports irregular bleeding  She is sexually active  She is contemplating a future pregnancy but states this would be at least a year out    Review of external notes as documented above                   A detailed Review of Systems was performed, verified and is negative except as documented in the HPI.  All health questionnaires were reviewed, verified and relevant information documented above.      Past Medical History:  Past Medical History:   Diagnosis Date     Anxiety      Bleeding disorder (H)      Blood clotting disorder (H)      Cerebral infarction (H) 2015     Cognitive developmental delay     low IQ. Please recognize when managing pain and planning with her     Depressive disorder      Hemiplegia and hemiparesis following cerebral infarction affecting right dominant side (H)     right hand contractures     Iron overload due to repeated red blood cell transfusions      Migraines      Multiple subsegmental pulmonary emboli without acute cor pulmonale (H) 02/01/2021     Oppositional defiant behavior      Presence of intrauterine contraceptive device 2/18/2020     Superficial venous thrombosis of arm, right 03/25/2021     Uncomplicated asthma        Active Meds:  Current Outpatient Medications   Medication     acetaminophen (TYLENOL) 325 MG tablet     albuterol (PROAIR HFA/PROVENTIL HFA/VENTOLIN HFA) 108 (90 Base) MCG/ACT inhaler     albuterol (PROVENTIL) (2.5 MG/3ML) 0.083% neb solution     aspirin (ASA) 81 MG chewable tablet      budesonide-formoterol (SYMBICORT) 160-4.5 MCG/ACT Inhaler     deferasirox (JADENU) 360 MG tablet     diclofenac (VOLTAREN) 1 % topical gel     diphenhydrAMINE (BENADRYL) 25 MG capsule     EPINEPHrine (ANY BX GENERIC EQUIV) 0.3 MG/0.3ML injection 2-pack     FLUoxetine (PROZAC) 10 MG capsule     folic acid (FOLVITE) 1 MG tablet     Hydroxyurea 1000 MG TABS     naloxone (NARCAN) 4 MG/0.1ML nasal spray     ondansetron (ZOFRAN) 8 MG tablet     oxyCODONE IR (ROXICODONE) 15 MG tablet     traZODone (DESYREL) 50 MG tablet     Current Facility-Administered Medications   Medication     etonogestrel (NEXPLANON) subdermal implant 68 mg     medroxyPROGESTERone (DEPO-PROVERA) injection 150 mg        Allergies:  Reviewed, refer to EMR    Relevant Social History:  Social History     Tobacco Use     Smoking status: Never     Smokeless tobacco: Never   Substance Use Topics     Alcohol use: Not Currently     Alcohol/week: 0.0 standard drinks of alcohol     Drug use: Never        Physical Exam:  Vitals: /85 (BP Location: Right arm, Patient Position: Sitting, Cuff Size: Adult Regular)   Pulse 104   Wt 69.3 kg (152 lb 12.8 oz)   SpO2 93%   BMI 26.23 kg/m    Constitutional: Alert, oriented, pleasant, no acute distress  Head: Normocephalic, atraumatic  Eyes: Extra-ocular movements intact, pupils equally round bilaterally, no scleral icterus  Musculoskeletal: No edema, normal muscle tone, normal gait  Neurologic: Alert and oriented, cranial nerves 2-12 intact, grossly non-focal  Skin: No rashes/lesions  Psychiatric: normal mentation, affect and mood      Diagnostics:  Labs reviewed in Epic          Assessment and Plan:  Jennifer was seen today for minor procedure.    Diagnoses and all orders for this visit:    Encounter for initial prescription of implantable subdermal contraceptive  -     lidocaine 1% with EPINEPHrine 1:100,000 injection 5 mL    [facility]  Procedure Note - Etonogestrel Implant Insertion     HPI:  Jennifer Cervantes is a patient of Suraj Pascual here for Nexplanon/Implanon (etonogestrel implant) insertion.   Indication: unwanted fertility  LMP   N/A  STI history:   denies  Prev Contraception? depoprovera  Smoking?  No    Counselling and Consent:  Affirmation of informed consent was signed and scanned into the medical record. Risks, benefits and alternatives were discussed. Discussed potential side effects of the etonogestrel implant including the risk of irregular bleeding that may persist across the 3 yrs of use.  Instructed on use of condoms for STI prevention.  Patient's questions were elicited and answered.      Procedure safety checklist was completed:  Yes  Time Out (Pause for the Cause) completed: Yes    Labs: UPT negative    Preoperative Diagnosis:  Unwanted fertility  Postoperative Diagnosis:  same     Technique:   Skin prep chloraprep  Anesthesia 1% lidocaine with epi  Suture  No   EBL:   minimal  Complications: No  Tolerance:  Pt tolerated procedure well and was in stable condition.     Pt was positioned on exam table with right arm flexed and externally rotated. Area was marked for insertion 8cm frm the medial epicondyle posterior to the sulcus between the biceps and triceps. Anesthesia provided at the insertion site and along the insertion track and then the area was prepped with betadine. Etonogestrel implant was then inserted subdermally in usual fashion. Provider and patient confirmed placement by palpating the device. Pressure dressing applied and procedure complete.     Follow up:  Pt was instructed to call if bleeding, severe pain or foul smell.  Instructed to remove pressure dressing after 24 hours, then may keep insertion site covered with a bandaid until it is healed.  Instructed that she requires removal or replacement of the device in 3 years.    LOT [id number]        Zaynab Perez MD

## 2023-05-22 ENCOUNTER — INFUSION THERAPY VISIT (OUTPATIENT)
Dept: INFUSION THERAPY | Facility: CLINIC | Age: 23
End: 2023-05-22
Attending: PEDIATRICS
Payer: COMMERCIAL

## 2023-05-22 ENCOUNTER — NURSE TRIAGE (OUTPATIENT)
Dept: ONCOLOGY | Facility: CLINIC | Age: 23
End: 2023-05-22
Payer: COMMERCIAL

## 2023-05-22 VITALS
DIASTOLIC BLOOD PRESSURE: 78 MMHG | TEMPERATURE: 98.2 F | HEART RATE: 89 BPM | SYSTOLIC BLOOD PRESSURE: 121 MMHG | RESPIRATION RATE: 16 BRPM | OXYGEN SATURATION: 98 %

## 2023-05-22 DIAGNOSIS — D57.00 SICKLE CELL PAIN CRISIS (H): ICD-10-CM

## 2023-05-22 DIAGNOSIS — G81.10 SPASTIC HEMIPLEGIA, UNSPECIFIED ETIOLOGY, UNSPECIFIED LATERALITY (H): Primary | ICD-10-CM

## 2023-05-22 PROCEDURE — 250N000011 HC RX IP 250 OP 636: Performed by: PEDIATRICS

## 2023-05-22 PROCEDURE — 96374 THER/PROPH/DIAG INJ IV PUSH: CPT

## 2023-05-22 PROCEDURE — 96376 TX/PRO/DX INJ SAME DRUG ADON: CPT

## 2023-05-22 PROCEDURE — 96361 HYDRATE IV INFUSION ADD-ON: CPT

## 2023-05-22 PROCEDURE — 258N000003 HC RX IP 258 OP 636: Performed by: PEDIATRICS

## 2023-05-22 RX ORDER — HEPARIN SODIUM,PORCINE 10 UNIT/ML
5 VIAL (ML) INTRAVENOUS
Status: DISCONTINUED | OUTPATIENT
Start: 2023-05-22 | End: 2023-05-22 | Stop reason: HOSPADM

## 2023-05-22 RX ORDER — DIPHENHYDRAMINE HCL 25 MG
25 CAPSULE ORAL
Status: CANCELLED
Start: 2023-07-01

## 2023-05-22 RX ORDER — ONDANSETRON 4 MG/1
8 TABLET, FILM COATED ORAL
Status: CANCELLED
Start: 2023-07-01

## 2023-05-22 RX ORDER — HEPARIN SODIUM (PORCINE) LOCK FLUSH IV SOLN 100 UNIT/ML 100 UNIT/ML
5 SOLUTION INTRAVENOUS
Status: CANCELLED | OUTPATIENT
Start: 2023-07-01

## 2023-05-22 RX ORDER — DIPHENHYDRAMINE HCL 25 MG
25 CAPSULE ORAL
Status: DISCONTINUED | OUTPATIENT
Start: 2023-05-22 | End: 2023-05-22 | Stop reason: HOSPADM

## 2023-05-22 RX ORDER — HEPARIN SODIUM,PORCINE 10 UNIT/ML
5 VIAL (ML) INTRAVENOUS
Status: CANCELLED | OUTPATIENT
Start: 2023-07-01

## 2023-05-22 RX ORDER — OXYCODONE HYDROCHLORIDE 15 MG/1
15 TABLET ORAL EVERY 4 HOURS PRN
Qty: 25 TABLET | Refills: 0 | Status: SHIPPED | OUTPATIENT
Start: 2023-05-22 | End: 2023-05-30

## 2023-05-22 RX ORDER — HEPARIN SODIUM (PORCINE) LOCK FLUSH IV SOLN 100 UNIT/ML 100 UNIT/ML
5 SOLUTION INTRAVENOUS
Status: DISCONTINUED | OUTPATIENT
Start: 2023-05-22 | End: 2023-05-22 | Stop reason: HOSPADM

## 2023-05-22 RX ADMIN — HYDROMORPHONE HYDROCHLORIDE 1 MG: 1 INJECTION, SOLUTION INTRAMUSCULAR; INTRAVENOUS; SUBCUTANEOUS at 09:47

## 2023-05-22 RX ADMIN — HYDROMORPHONE HYDROCHLORIDE 1 MG: 1 INJECTION, SOLUTION INTRAMUSCULAR; INTRAVENOUS; SUBCUTANEOUS at 11:49

## 2023-05-22 RX ADMIN — Medication 5 ML: at 12:08

## 2023-05-22 RX ADMIN — HYDROMORPHONE HYDROCHLORIDE 1 MG: 1 INJECTION, SOLUTION INTRAMUSCULAR; INTRAVENOUS; SUBCUTANEOUS at 10:53

## 2023-05-22 RX ADMIN — SODIUM CHLORIDE, POTASSIUM CHLORIDE, SODIUM LACTATE AND CALCIUM CHLORIDE 1000 ML: 600; 310; 30; 20 INJECTION, SOLUTION INTRAVENOUS at 09:42

## 2023-05-22 NOTE — TELEPHONE ENCOUNTER
Oxycodone rx signed.  Called Jennifer to let her know that the rx has been sent to pharmacy and she can call and ask when refill will be ready for pickup. Had to LVM.

## 2023-05-22 NOTE — TELEPHONE ENCOUNTER
Oncology Nurse Triage - Sickle Cell Pain Crisis:    Situation: Jennifer  calling about Sickle Cell Pain Crisis, requesting to be added on for IV fluids and pain medicine    Background:   Patient's last infusion was 5/18/23  Last clinic visit date:5/8/23 with Patricia Mantilla CNP  Next clinic visit date: 6/5/23 with Patricia Mantilla CNP  Does patient have active treatment plan?  Yes    Assessment of Symptoms:  Onset/Duration of symptoms: 2 day    Is it typical sickle cell pain? Yes   Location: generalized, back is worst, whole back  Character: Sharp           Intensity: 10/10    Any radiation of pain, numbness, tingling, weakness, warmth, swelling, discoloration of arms or legs?No     Fever?No  (if yes max temperature recorded in last 24 hours):      Chest Pain Present: No     Shortness of breath: No     Other home therapies tried: HEAT/HEATING PAD, hot showers, warm blankets    Last home medication taken and when: 0600, one tab oxy, 15 mg    Any Refills Needed?: Yes, oxycodone. Pended and routed in separate refill encounter.    Does patient have transportation & length of time to get to clinic: Yes 25 minutes    Recommendations:   Added to Infusion Wait List    If you do not hear from the infusion center by 2pm then you will not be able to get in for an infusion today. If symptoms worsen while waiting for call back, and/or you experience fever, chills, SOB, chest pain, cough, n/v, dizziness, numbness, swelling, discoloration of extremities, then seek emergency evaluation in Emergency Department.     0730: [facility] can offer apt at 0730.   Called Jennifer who will check to see if she can get a ride and make apt at 0900.  Updated [facility] Charge Nurse.    0746: Jennifer called back to confirm that her taxi can pick her up and bring her to apt.   Updated Roz [facility] Charge Nurse that Jennifer is confirmed.  Message sent to [facility] to schedule.  Will pend and route refill RX to provider in separate encounter.    Routed to Patricia Mantilla CNP and  Arely Krueger RNCC

## 2023-05-22 NOTE — TELEPHONE ENCOUNTER
Narcotic Refill Request    Medication(s) requested:  Oxycodone IR 15 MG tablet  Person Requesting Refill:Patricia Mantilla CNP  What pain is the medication treating: Sickle Cell pain  How is the medication being taken?:1 tablet every 4 hours  Does pt have enough for today? yes  Is pain being adequately controlled on the current regimen?: yes  Experiencing any side effects from medication?: No    Date of most recent appointment:  5/8/23 w/Patricia Mantilla CNP  Any No Show Visits:No  Next appointment:   6/5/23 with Patricia Mantilla CNP  Last fill date and by whom:  5/16/23 by Patricia Mantilla CNP   Reviewed: No    Routed/Paged provider: Patricia Mantilla CNP

## 2023-05-22 NOTE — PROGRESS NOTES
Nursing Note  Jennifer Cervantes presents today to [facility] for:   Chief Complaint   Patient presents with     Infusion     IVF/pain meds       During today's [facility] appointment, orders from Dr. Duncan were completed.  Frequency: as needed    Progress note:  Patient identification verified by name and date of birth.  Assessment completed.  Vitals recorded in Doc Flowsheets.  Patient was provided with education regarding medication/procedure and possible side effects.  Patient verbalized understanding.     present during visit today: Not Applicable.    Treatment Conditions: Pt reports pain 10/10 upon arrival. See flowsheet for pain ratings after dilaudid    Infusion length and rate:  infusion given over approximately 2 hours    Labs: were not ordered for this appointment.    Vascular access: port accessed today.    Is the next appt scheduled? no    Post Infusion Assessment:  Patient tolerated infusion without incident.     Discharge Plan:   Follow up plan of care with: ordering provider as scheduled.  Discharge instructions were reviewed with patient.  Patient/representative verbalized understanding of discharge instructions and all questions answered.  Patient discharged from [facility] in stable condition.    Patricia Qureshi RN  Administrations This Visit     heparin 100 unit/mL injection 5 mL     Admin Date  05/22/2023 Action  $Given Dose  5 mL Route  Intracatheter Administered By  Patricia Qureshi RN          HYDROmorphone (DILAUDID) injection 1 mg     Admin Date  05/22/2023 Action  $Given Dose  1 mg Route  Intravenous Administered By  Patricia Qureshi RN           Admin Date  05/22/2023 Action  $Given Dose  1 mg Route  Intravenous Administered By  Patricia Qureshi RN           Admin Date  05/22/2023 Action  $Given Dose  1 mg Route  Intravenous Administered By  Patricia Qureshi RN          lactated ringers  BOLUS 1,000 mL     Admin Date  05/22/2023 Action  $New Bag Dose  1,000 mL Rate  500 mL/hr Route  Intravenous Administered By  Patricia Qureshi RN              /78 (BP Location: Left arm)   Pulse 89   Temp 98.2  F (36.8  C) (Oral)   Resp 16   SpO2 98%

## 2023-05-25 ENCOUNTER — THERAPY VISIT (OUTPATIENT)
Dept: OCCUPATIONAL THERAPY | Facility: CLINIC | Age: 23
End: 2023-05-25
Payer: COMMERCIAL

## 2023-05-25 ENCOUNTER — NURSE TRIAGE (OUTPATIENT)
Dept: ONCOLOGY | Facility: CLINIC | Age: 23
End: 2023-05-25

## 2023-05-25 DIAGNOSIS — I69.351 HEMIPLEGIA AND HEMIPARESIS FOLLOWING CEREBRAL INFARCTION AFFECTING RIGHT DOMINANT SIDE (H): Primary | ICD-10-CM

## 2023-05-25 DIAGNOSIS — R25.2 SPASTICITY: ICD-10-CM

## 2023-05-25 PROCEDURE — 97140 MANUAL THERAPY 1/> REGIONS: CPT | Mod: GO

## 2023-05-25 PROCEDURE — 97763 ORTHC/PROSTC MGMT SBSQ ENC: CPT | Mod: GO

## 2023-05-25 NOTE — TELEPHONE ENCOUNTER
Oncology Nurse Triage - Sickle Cell Pain Crisis:    Situation: Jennifer  calling about Sickle Cell Pain Crisis, requesting to be added on for IV fluids and pain medicine    Background:     Patient's last infusion was 5/22/23  Last clinic visit date:5/8/23  Does patient have active treatment plan?  Yes      Assessment of Symptoms:  Onset/Duration of symptoms: 1 day    Is it typical sickle cell pain? Yes   Location: left side of abdomen  Character: Sharp           Intensity: 8/10    Any radiation of pain, numbness, tingling, weakness, warmth, swelling, discoloration of arms or legs?No     Fever?No     Chest Pain Present: No     Shortness of breath: No     Other home therapies tried: HEAT/HEATING PAD     Last home medication taken and when: 6am    Any Refills Needed?: No     Does patient have transportation & length of time to get to clinic: No       Recommendations:     Meet protocol    If you do not hear from the infusion center by 2pm then you will not be able to get in for an infusion today. If symptoms worsen while waiting for call back, and/or you experience fever, chills, SOB, chest pain, cough, n/v, dizziness, numbness, swelling, discoloration of extremities, then seek emergency evaluation in Emergency Department.

## 2023-05-25 NOTE — PROGRESS NOTES
Hand Therapy Progress Note    Current Date: 5/25/23    Diagnosis: R hemiplegia s/p releases for improved ROM   DOI: stroke ~age 1  DOS: 10/2019   Procedure: R flexor pronator release, brachioradialis lengthening, biceps tenotomy, and thenar release.   Post: 3.5 years    Reporting period is   4/14/23 to 05/25/2023    Subjective:   Subjective changes noted by patient:  See flowsheet   Functional changes noted by patient:  Improvement in positioning for daily activities    Objective:   ROM  Pain Report: - none  + mild    ++ moderate    +++ severe   Elbow 3/3/2023 3/3/2023 4/14/23 5/25/23   AROM (PROM) L R R R   Extension 0 -44 (-38) -40 (-26) -40 (-30)   After tx   -30 (-23)   Flexion 144 144 nt nt   Supination 75 -15 (0) -10 (60) nt   Pronation 90 90 nt nt     ROM  Pain Report: - none  + mild    ++ moderate    +++ severe   Wrist 3/3/2023 3/3/2023 4/14/23   AROM (PROM) L R R   Extension 80 -80 (-58) -56 (-30)   Flexion 72 85 nt   RD full negligible nt   UD full negligible nt     Please refer to the daily flowsheet for treatment provided today.     Assessment:  Response to therapy has been improvement to:  ROM of elbow and positioning during functional activities   Overall Assessment:  Patient is ready to progress to next level of protocol.  Patient would benefit from continued therapy to achieve rehab potential  STG/LTG:  STGoals have been reviewed and progress or achievement has occurred;  see goal sheet for details and updates.    Plan:  Frequency/Duration:  Recommend continuing to see patient  1 X every other week, for 12 weeks   Appropriateness of Rx I have re-evaluated this patient and find that the nature, scope, duration and intensity of the therapy is appropriate for the medical condition of the patient.  Recommendations for Continued Therapy  Cont HEP and bracing.   Elbow ext brace overnight and in 1-2 hour intervals during the day  Alternate w/ static progressive resting hand splint for increased wrist ext  overnight and in 1-2 hour intervals during the day    Next Visit:  Check fit of elbow brace, adjust resting hand brace  Manual therapy for improved motion     Referring Provider:  Anai Franco

## 2023-05-25 NOTE — TELEPHONE ENCOUNTER
Jennifer is calling to check on openings in infusion.   Informed pt that at the current time, we do not have any openings.   Pt voiced understanding of this information.

## 2023-05-26 ENCOUNTER — NURSE TRIAGE (OUTPATIENT)
Dept: ONCOLOGY | Facility: CLINIC | Age: 23
End: 2023-05-26
Payer: COMMERCIAL

## 2023-05-26 ENCOUNTER — PATIENT OUTREACH (OUTPATIENT)
Dept: CARE COORDINATION | Facility: CLINIC | Age: 23
End: 2023-05-26
Payer: COMMERCIAL

## 2023-05-26 ENCOUNTER — INFUSION THERAPY VISIT (OUTPATIENT)
Dept: TRANSPLANT | Facility: CLINIC | Age: 23
End: 2023-05-26
Attending: PEDIATRICS
Payer: COMMERCIAL

## 2023-05-26 VITALS
DIASTOLIC BLOOD PRESSURE: 80 MMHG | HEART RATE: 87 BPM | SYSTOLIC BLOOD PRESSURE: 139 MMHG | OXYGEN SATURATION: 97 % | RESPIRATION RATE: 18 BRPM | TEMPERATURE: 98.4 F

## 2023-05-26 DIAGNOSIS — Z59.82 TRANSPORTATION INSECURITY: Primary | ICD-10-CM

## 2023-05-26 DIAGNOSIS — G81.10 SPASTIC HEMIPLEGIA, UNSPECIFIED ETIOLOGY, UNSPECIFIED LATERALITY (H): Primary | ICD-10-CM

## 2023-05-26 DIAGNOSIS — D57.00 SICKLE CELL PAIN CRISIS (H): ICD-10-CM

## 2023-05-26 PROCEDURE — 250N000011 HC RX IP 250 OP 636: Performed by: PEDIATRICS

## 2023-05-26 PROCEDURE — 96376 TX/PRO/DX INJ SAME DRUG ADON: CPT

## 2023-05-26 PROCEDURE — 96361 HYDRATE IV INFUSION ADD-ON: CPT

## 2023-05-26 PROCEDURE — 96374 THER/PROPH/DIAG INJ IV PUSH: CPT

## 2023-05-26 PROCEDURE — 258N000003 HC RX IP 258 OP 636: Performed by: PEDIATRICS

## 2023-05-26 RX ORDER — HEPARIN SODIUM (PORCINE) LOCK FLUSH IV SOLN 100 UNIT/ML 100 UNIT/ML
5 SOLUTION INTRAVENOUS
Status: CANCELLED | OUTPATIENT
Start: 2023-07-01

## 2023-05-26 RX ORDER — ONDANSETRON 8 MG/1
8 TABLET, ORALLY DISINTEGRATING ORAL
Status: DISCONTINUED | OUTPATIENT
Start: 2023-05-26 | End: 2023-05-26 | Stop reason: HOSPADM

## 2023-05-26 RX ORDER — ONDANSETRON 4 MG/1
8 TABLET, FILM COATED ORAL
Status: CANCELLED
Start: 2023-07-01

## 2023-05-26 RX ORDER — HEPARIN SODIUM (PORCINE) LOCK FLUSH IV SOLN 100 UNIT/ML 100 UNIT/ML
5 SOLUTION INTRAVENOUS ONCE
Status: COMPLETED | OUTPATIENT
Start: 2023-05-26 | End: 2023-05-26

## 2023-05-26 RX ORDER — HEPARIN SODIUM,PORCINE 10 UNIT/ML
5 VIAL (ML) INTRAVENOUS
Status: CANCELLED | OUTPATIENT
Start: 2023-07-01

## 2023-05-26 RX ORDER — DIPHENHYDRAMINE HCL 25 MG
25 CAPSULE ORAL
Status: CANCELLED
Start: 2023-07-01

## 2023-05-26 RX ORDER — DIPHENHYDRAMINE HCL 25 MG
25 CAPSULE ORAL
Status: DISCONTINUED | OUTPATIENT
Start: 2023-05-26 | End: 2023-05-26 | Stop reason: HOSPADM

## 2023-05-26 RX ADMIN — Medication 5 ML: at 15:27

## 2023-05-26 RX ADMIN — HYDROMORPHONE HYDROCHLORIDE 1 MG: 1 INJECTION, SOLUTION INTRAMUSCULAR; INTRAVENOUS; SUBCUTANEOUS at 12:52

## 2023-05-26 RX ADMIN — HYDROMORPHONE HYDROCHLORIDE 1 MG: 1 INJECTION, SOLUTION INTRAMUSCULAR; INTRAVENOUS; SUBCUTANEOUS at 14:53

## 2023-05-26 RX ADMIN — SODIUM CHLORIDE, POTASSIUM CHLORIDE, SODIUM LACTATE AND CALCIUM CHLORIDE 1000 ML: 600; 310; 30; 20 INJECTION, SOLUTION INTRAVENOUS at 12:52

## 2023-05-26 RX ADMIN — HYDROMORPHONE HYDROCHLORIDE 1 MG: 1 INJECTION, SOLUTION INTRAMUSCULAR; INTRAVENOUS; SUBCUTANEOUS at 13:54

## 2023-05-26 SDOH — ECONOMIC STABILITY - TRANSPORTATION SECURITY: TRANSPORTATION INSECURITY: Z59.82

## 2023-05-26 ASSESSMENT — PAIN SCALES - GENERAL: PAINLEVEL: EXTREME PAIN (9)

## 2023-05-26 NOTE — TELEPHONE ENCOUNTER
Oncology Nurse Triage - Sickle Cell Pain Crisis:     Situation: Jennifer  calling about Sickle Cell Pain Crisis, requesting to be added on for IV fluids and pain medicine     Background:      Patient's last infusion was 5/22/23  Last clinic visit date:5/8/23  Does patient have active treatment plan?  Yes        Assessment of Symptoms:  Onset/Duration of symptoms: 2 day     Is it typical sickle cell pain? Yes   Location: left side of abdomen  Character: Sharp           Intensity: 10/10     Any radiation of pain, numbness, tingling, weakness, warmth, swelling, discoloration of arms or legs?No      Fever?No      Chest Pain Present: No      Shortness of breath: No      Other home therapies tried: HEAT/HEATING PAD      Last home medication taken and when: 6am     Any Refills Needed?: No      Does patient have transportation & length of time to get to clinic: No         Recommendations:      Meet protocol     If you do not hear from the infusion center by 2pm then you will not be able to get in for an infusion today. If symptoms worsen while waiting for call back, and/or you experience fever, chills, SOB, chest pain, cough, n/v, dizziness, numbness, swelling, discoloration of extremities, then seek emergency evaluation in Emergency Department.

## 2023-05-26 NOTE — PROGRESS NOTES
Infusion Nursing Note:  Jennifer Cervantes presents today for ivf and pain meds.    Patient seen by provider today: No   present during visit today: Not Applicable.    Note: Jennifer here with 9/10 pain to left side. Vitals/allergies/meds reviewed. Received 1L LR bolus and total 3mg IV dilaudid. Pain decreased to manageable level on discharge.      Intravenous Access:  Implanted Port.    Treatment Conditions:  Lab Results   Component Value Date    HGB 6.7 (LL) 05/17/2023    WBC 13.5 (H) 05/17/2023    ANEU 6.7 05/12/2023    ANEUTAUTO 9.6 (H) 05/17/2023     (H) 05/17/2023      Lab Results   Component Value Date     05/17/2023    POTASSIUM 4.0 05/17/2023    MAG 2.0 11/11/2021    CR 0.51 05/17/2023    MICAH 8.9 05/17/2023    BILITOTAL 1.5 (H) 05/14/2023    ALBUMIN 3.8 05/13/2023    ALT 12 05/11/2023    AST 27 05/11/2023         Post Infusion Assessment:  Patient tolerated infusion without incident.  Blood return noted pre and post infusion.  No evidence of extravasations.  Access discontinued per protocol.       Discharge Plan:   Patient discharged in stable condition accompanied by: self.  Departure Mode: Ambulatory.      Allison Bowman RN

## 2023-05-26 NOTE — TELEPHONE ENCOUNTER
Jennifer is calling to see if there are any openings for infusion today.  Pt is next on wait list.   Infusion centers are all very full, pt is considering going to the ED.   Pt states she has been very patient and has not been able to get in this week since Monday.  Explained that we are unable to guarantee an apt today unless there is a cancellation and at this time, there are no open spots. Apologized to pt that we cannot offer her an infusion apt right now, but if one does become available, she is next on the list, and we will call her.  Pt voiced understanding of this.

## 2023-05-26 NOTE — TELEPHONE ENCOUNTER
1043 This writer spoke to Jennifer, appt available for BMT 1:30pm for IVF/pain, jennifer in agreement with plan.     1046 Paged  to assist with transportation needs.     1047 Scheduling request sent

## 2023-05-26 NOTE — PROGRESS NOTES
SW received referral to assist with transportation coordination. SW called [employer] and arranged ride for patient. SW was informed by [employer] that a ride was already scheduled. SW comnfirmed appointments for the month of June for rides patient had scheduled. Sw called and spoke with patient who reported they had set up a ride for today's infusion appointment and thanked SW for their help. SW confirmed they had a ride to and from the clinic; patient responded yes to this. No further needs were identified. Patient has SW's contact information if further assistance is needed.         Corry GONZALEZ, LGSW  - Oncology  Phone : [phone]  Pager: [phone]

## 2023-05-30 ENCOUNTER — INFUSION THERAPY VISIT (OUTPATIENT)
Dept: TRANSPLANT | Facility: CLINIC | Age: 23
End: 2023-05-30
Attending: PEDIATRICS
Payer: COMMERCIAL

## 2023-05-30 ENCOUNTER — PATIENT OUTREACH (OUTPATIENT)
Dept: CARE COORDINATION | Facility: CLINIC | Age: 23
End: 2023-05-30
Payer: COMMERCIAL

## 2023-05-30 ENCOUNTER — NURSE TRIAGE (OUTPATIENT)
Dept: ONCOLOGY | Facility: CLINIC | Age: 23
End: 2023-05-30
Payer: COMMERCIAL

## 2023-05-30 VITALS
DIASTOLIC BLOOD PRESSURE: 82 MMHG | RESPIRATION RATE: 16 BRPM | OXYGEN SATURATION: 98 % | SYSTOLIC BLOOD PRESSURE: 118 MMHG | HEART RATE: 93 BPM | TEMPERATURE: 98.4 F

## 2023-05-30 DIAGNOSIS — D57.00 SICKLE CELL PAIN CRISIS (H): ICD-10-CM

## 2023-05-30 DIAGNOSIS — G81.10 SPASTIC HEMIPLEGIA, UNSPECIFIED ETIOLOGY, UNSPECIFIED LATERALITY (H): Primary | ICD-10-CM

## 2023-05-30 PROCEDURE — 96376 TX/PRO/DX INJ SAME DRUG ADON: CPT

## 2023-05-30 PROCEDURE — 96361 HYDRATE IV INFUSION ADD-ON: CPT

## 2023-05-30 PROCEDURE — 258N000003 HC RX IP 258 OP 636: Performed by: PEDIATRICS

## 2023-05-30 PROCEDURE — 250N000011 HC RX IP 250 OP 636: Performed by: PEDIATRICS

## 2023-05-30 PROCEDURE — 250N000013 HC RX MED GY IP 250 OP 250 PS 637: Performed by: PEDIATRICS

## 2023-05-30 PROCEDURE — 96374 THER/PROPH/DIAG INJ IV PUSH: CPT

## 2023-05-30 RX ORDER — DIPHENHYDRAMINE HCL 25 MG
25 CAPSULE ORAL
Status: COMPLETED | OUTPATIENT
Start: 2023-05-30 | End: 2023-05-30

## 2023-05-30 RX ORDER — DIPHENHYDRAMINE HCL 25 MG
25 CAPSULE ORAL
Status: CANCELLED
Start: 2023-07-01

## 2023-05-30 RX ORDER — HEPARIN SODIUM (PORCINE) LOCK FLUSH IV SOLN 100 UNIT/ML 100 UNIT/ML
5 SOLUTION INTRAVENOUS
Status: CANCELLED | OUTPATIENT
Start: 2023-07-01

## 2023-05-30 RX ORDER — HEPARIN SODIUM,PORCINE 10 UNIT/ML
5 VIAL (ML) INTRAVENOUS
Status: CANCELLED | OUTPATIENT
Start: 2023-07-01

## 2023-05-30 RX ORDER — ONDANSETRON 4 MG/1
8 TABLET, FILM COATED ORAL
Status: CANCELLED
Start: 2023-07-01

## 2023-05-30 RX ORDER — ONDANSETRON 8 MG/1
8 TABLET, ORALLY DISINTEGRATING ORAL
Status: DISCONTINUED | OUTPATIENT
Start: 2023-05-30 | End: 2023-05-30 | Stop reason: HOSPADM

## 2023-05-30 RX ORDER — OXYCODONE HYDROCHLORIDE 15 MG/1
15 TABLET ORAL EVERY 4 HOURS PRN
Qty: 25 TABLET | Refills: 0 | Status: SHIPPED | OUTPATIENT
Start: 2023-05-30 | End: 2023-06-05

## 2023-05-30 RX ADMIN — HYDROMORPHONE HYDROCHLORIDE 1 MG: 1 INJECTION, SOLUTION INTRAMUSCULAR; INTRAVENOUS; SUBCUTANEOUS at 10:12

## 2023-05-30 RX ADMIN — HYDROMORPHONE HYDROCHLORIDE 1 MG: 1 INJECTION, SOLUTION INTRAMUSCULAR; INTRAVENOUS; SUBCUTANEOUS at 12:22

## 2023-05-30 RX ADMIN — HYDROMORPHONE HYDROCHLORIDE 1 MG: 1 INJECTION, SOLUTION INTRAMUSCULAR; INTRAVENOUS; SUBCUTANEOUS at 11:14

## 2023-05-30 RX ADMIN — DIPHENHYDRAMINE HYDROCHLORIDE 25 MG: 25 CAPSULE ORAL at 10:12

## 2023-05-30 RX ADMIN — SODIUM CHLORIDE, POTASSIUM CHLORIDE, SODIUM LACTATE AND CALCIUM CHLORIDE 1000 ML: 600; 310; 30; 20 INJECTION, SOLUTION INTRAVENOUS at 10:12

## 2023-05-30 NOTE — PROGRESS NOTES
Community Health Worker Note: Telephone Call  Oncology Clinic     Data/Intervention:  Patient Name:  Jennifer Cervantes  /Age: [birth year]     Call From: Triage Nurse        Reason for Call:  Transportation      Assessment:     CHW also called [employer]  ([phone] )  to arrange ride through patient's insurance.  [employer] arranged a round trip ride, [employer] stated they have a policy that they can only setup a ride 1hr after the request. Meaning that it would be set up for 9:45 am when the patient's ride is at 10 am so we agreed it would be set as a will call ride with transportation plus.When I reached out to [facility] they informed me that the ride was cancelled by the patient. I reached out to the patient 5 times and left a VM. I reached out to [facility] and RN Jennifer to see if the appointment can be pushed back, which Jennifer assisted with. Appointment time was changed to 1 pm. When I reached out to the patient she stated she had not cancelled any of her rides and that she is currently on her way to the clinic and does not want a time change. After some time Jennifer was able to switched her back to her original appointment time. I set up a ride back from the clinic to patient's home with [employer] as a will call and since I couldn't get a hold of patient left a VM.     Plan:  Patient is aware of the transportation plan. /CHW available to assist with any other needs.      Isaura OTTO Community Health Worker  Clinic Care Coordination  [facility]  Phone: [phone]

## 2023-05-30 NOTE — TELEPHONE ENCOUNTER
Oncology Nurse Triage - Sickle Cell Pain Crisis:    Situation: Jennifer  calling about Sickle Cell Pain Crisis, requesting to be added on for IV fluids and pain medicine    Background:     Patient's last infusion was 5/26/23   Last clinic visit date:5/8/23 Patricia Mantilla CNP  Does patient have active treatment plan?  Yes      Assessment of Symptoms:  Onset/Duration of symptoms: 2 day    Is it typical sickle cell pain? Yes   Location: lower back  Character: Sharp           Intensity: 10/10    Any radiation of pain, numbness, tingling, weakness, warmth, swelling, discoloration of arms or legs?No     Fever?No      Chest Pain Present: No     Shortness of breath: No     Other home therapies tried: HEAT/HEATING PAD     Last home medication taken and when: around 6am    Any Refills Needed?:     Does patient have transportation & length of time to get to clinic: No     Recommendations:   Meets protocol  0826 Appt with BMT is available for 10am, Jennifer in agreement with appt time.     0827 Paged SW for transportation assist.    9975 Scheduling request sent       Please note, if you are late for your appt, you risk losing your infusion appt as it may delay another patient's infusion who arrived on time.

## 2023-05-30 NOTE — TELEPHONE ENCOUNTER
Refill: Oxycodone   See details in Sickle Cell Crisis encounter  Last refilled on 5/22/23   checked:

## 2023-05-30 NOTE — PROGRESS NOTES
Infusion Nursing Note:  Jennifer Cervantes presents today for IVF and pain meds.    Patient seen by provider today: No   present during visit today: Not Applicable.    Note: Jennifer here today with 9/10 pain to her low back and arms. Received 1mg x3 doses IV dilaudid, 1L LR bolus, also requested 25mg PO benadryl today. She tolerated infusion well, pain decreased to <4/10 on discharge.      Intravenous Access:  Implanted Port.    Treatment Conditions:  Not Applicable.      Post Infusion Assessment:  Patient tolerated infusion without incident.  Blood return noted pre and post infusion.  Site patent and intact, free from redness, edema or discomfort.  No evidence of extravasations.       Discharge Plan:   Patient discharged in stable condition accompanied by: self.  Departure Mode: Ambulatory.      Allison Bowman RN

## 2023-06-01 ENCOUNTER — INFUSION THERAPY VISIT (OUTPATIENT)
Dept: INFUSION THERAPY | Facility: CLINIC | Age: 23
End: 2023-06-01
Attending: PEDIATRICS
Payer: COMMERCIAL

## 2023-06-01 ENCOUNTER — PATIENT OUTREACH (OUTPATIENT)
Dept: CARE COORDINATION | Facility: CLINIC | Age: 23
End: 2023-06-01
Payer: COMMERCIAL

## 2023-06-01 ENCOUNTER — NURSE TRIAGE (OUTPATIENT)
Dept: ONCOLOGY | Facility: CLINIC | Age: 23
End: 2023-06-01
Payer: COMMERCIAL

## 2023-06-01 VITALS
DIASTOLIC BLOOD PRESSURE: 68 MMHG | TEMPERATURE: 98.1 F | HEART RATE: 87 BPM | SYSTOLIC BLOOD PRESSURE: 115 MMHG | OXYGEN SATURATION: 96 % | RESPIRATION RATE: 16 BRPM

## 2023-06-01 DIAGNOSIS — D57.00 SICKLE CELL PAIN CRISIS (H): ICD-10-CM

## 2023-06-01 DIAGNOSIS — G81.10 SPASTIC HEMIPLEGIA, UNSPECIFIED ETIOLOGY, UNSPECIFIED LATERALITY (H): Primary | ICD-10-CM

## 2023-06-01 PROCEDURE — 96376 TX/PRO/DX INJ SAME DRUG ADON: CPT

## 2023-06-01 PROCEDURE — 258N000003 HC RX IP 258 OP 636: Performed by: PEDIATRICS

## 2023-06-01 PROCEDURE — 96374 THER/PROPH/DIAG INJ IV PUSH: CPT

## 2023-06-01 PROCEDURE — 96361 HYDRATE IV INFUSION ADD-ON: CPT

## 2023-06-01 PROCEDURE — 250N000013 HC RX MED GY IP 250 OP 250 PS 637: Performed by: PEDIATRICS

## 2023-06-01 PROCEDURE — 250N000011 HC RX IP 250 OP 636: Performed by: PEDIATRICS

## 2023-06-01 RX ORDER — HEPARIN SODIUM,PORCINE 10 UNIT/ML
5 VIAL (ML) INTRAVENOUS
Status: CANCELLED | OUTPATIENT
Start: 2023-07-01

## 2023-06-01 RX ORDER — ONDANSETRON 4 MG/1
8 TABLET, FILM COATED ORAL
Status: CANCELLED
Start: 2023-07-01

## 2023-06-01 RX ORDER — DIPHENHYDRAMINE HCL 25 MG
25 CAPSULE ORAL
Status: COMPLETED | OUTPATIENT
Start: 2023-06-01 | End: 2023-06-01

## 2023-06-01 RX ORDER — HEPARIN SODIUM (PORCINE) LOCK FLUSH IV SOLN 100 UNIT/ML 100 UNIT/ML
5 SOLUTION INTRAVENOUS
Status: DISCONTINUED | OUTPATIENT
Start: 2023-06-01 | End: 2023-06-01 | Stop reason: HOSPADM

## 2023-06-01 RX ORDER — DIPHENHYDRAMINE HCL 25 MG
25 CAPSULE ORAL
Status: CANCELLED
Start: 2023-07-01

## 2023-06-01 RX ORDER — HEPARIN SODIUM (PORCINE) LOCK FLUSH IV SOLN 100 UNIT/ML 100 UNIT/ML
5 SOLUTION INTRAVENOUS
Status: CANCELLED | OUTPATIENT
Start: 2023-07-01

## 2023-06-01 RX ADMIN — Medication 5 ML: at 16:26

## 2023-06-01 RX ADMIN — HYDROMORPHONE HYDROCHLORIDE 1 MG: 1 INJECTION, SOLUTION INTRAMUSCULAR; INTRAVENOUS; SUBCUTANEOUS at 16:00

## 2023-06-01 RX ADMIN — DIPHENHYDRAMINE HYDROCHLORIDE 25 MG: 25 CAPSULE ORAL at 13:53

## 2023-06-01 RX ADMIN — SODIUM CHLORIDE, POTASSIUM CHLORIDE, SODIUM LACTATE AND CALCIUM CHLORIDE 1000 ML: 600; 310; 30; 20 INJECTION, SOLUTION INTRAVENOUS at 13:53

## 2023-06-01 RX ADMIN — HYDROMORPHONE HYDROCHLORIDE 1 MG: 1 INJECTION, SOLUTION INTRAMUSCULAR; INTRAVENOUS; SUBCUTANEOUS at 15:01

## 2023-06-01 RX ADMIN — HYDROMORPHONE HYDROCHLORIDE 1 MG: 1 INJECTION, SOLUTION INTRAMUSCULAR; INTRAVENOUS; SUBCUTANEOUS at 13:57

## 2023-06-01 NOTE — TELEPHONE ENCOUNTER
[facility] can offer 1330 apt  Called Jennifer who confirmed she can come to apt at 1330.  Message sent to SW at 1124  Message sent to [facility] to ask them to schedule pt.  Update given to Roz, Charge Nurse.  Paged UDAY at 1121    Message routed to Patricia Mantilla CNP and MAURISIO Asencio

## 2023-06-01 NOTE — PATIENT INSTRUCTIONS
Dear Jennifer Cervantes    Thank you for choosing [facility] ([facility]) for your infusion.  The following information is a summary of our appointment as well as important reminders.          We look forward in seeing you on your next appointment here at [facility] ([facility]).  Please don t hesitate to call us at [phone] to reschedule any of your appointments or to speak with one of the [facility] registered nurses.  It was a pleasure taking care of you today.    Sincerely,    [facility]  [address] [place] [address]  [id number]  Phone:  [phone]

## 2023-06-01 NOTE — TELEPHONE ENCOUNTER
Oncology Nurse Triage - Sickle Cell Pain Crisis:    Situation: Jennifer  calling about Sickle Cell Pain Crisis, requesting to be added on for IV fluids and pain medicine    Background:     Patient's last infusion was 5/30/23   Last clinic visit date:5/8/23 Patricia Mantilla   Does patient have active treatment plan?  Yes      Assessment of Symptoms:  Onset/Duration of symptoms: 1 day    Is it typical sickle cell pain? Yes   Location: lower back   Character: Sharp           Intensity: 10/10    Any radiation of pain, numbness, tingling, weakness, warmth, swelling, discoloration of arms or legs?No     Fever?No  (if yes max temperature recorded in last 24 hours):      Chest Pain Present: No     Shortness of breath: No     Other home therapies tried: HEAT/HEATING PAD and WARM BATH     Last home medication taken and when: 6am oxycodone     Any Refills Needed?: No     Does patient have transportation & length of time to get to clinic: No needs help with transportation          Recommendations:         If you do not hear from the infusion center by 2pm then you will not be able to get in for an infusion today. If symptoms worsen while waiting for call back, and/or you experience fever, chills, SOB, chest pain, cough, n/v, dizziness, numbness, swelling, discoloration of extremities, then seek emergency evaluation in Emergency Department.     Please note, if you are late for your appt, you risk losing your infusion appt as it may delay another patient's infusion who arrived on time.

## 2023-06-01 NOTE — PROGRESS NOTES
Infusion Nursing Note:  Jennifer Cervantes presents today for IVF, Pain meds, Anti-allergy.    Patient seen by provider today: No   present during visit today: Not Applicable.    Note: Patient present today in [facility] with complaints of back pain (Pain level of 9). As ordered in her therapy pan, below treatment administered:    Administrations This Visit     diphenhydrAMINE (BENADRYL) capsule 25 mg     Admin Date  06/01/2023 Action  $Given Dose  25 mg Route  Oral Administered By  Lien Hathaway RN          heparin 100 unit/mL injection 5 mL     Admin Date  06/01/2023 Action  $Given Dose  5 mL Route  Intracatheter Administered By  Lien Hathaway RN          HYDROmorphone (DILAUDID) injection 1 mg     Admin Date  06/01/2023 Action  $Given Dose  1 mg Route  Intravenous Administered By  Lien Hathaway RN           Admin Date  06/01/2023 Action  $Given Dose  1 mg Route  Intravenous Administered By  Lien Hathaway RN           Admin Date  06/01/2023 Action  $Given Dose  1 mg Route  Intravenous Administered By  Lien Hathaway RN          lactated ringers BOLUS 1,000 mL     Admin Date  06/01/2023 Action  $New Bag Dose  1,000 mL Rate  500 mL/hr Route  Intravenous Administered By  Lien Hathaway RN                  Intravenous Access:  Implanted Port.  Port accessed twice during the visit (with patient permission) because I did not realized that I still haven't locked it with heparin before de accessing the first time.    Treatment Conditions:  None.      Post Infusion Assessment:  Patient tolerated infusion without incident.  Blood return noted pre and post infusion.  Site patent and intact, free from redness, edema or discomfort.  No evidence of extravasations.       Discharge Plan:   Discharge instructions reviewed with: Patient.  Patient and/or family verbalized understanding of discharge instructions and all questions answered.  AVS to patient via [employer].  Patient will return to [facility] as needed.   Patient discharged in stable condition  accompanied by: self.  Departure Mode: Ambulatory.    /68 (BP Location: Right arm, Patient Position: Semi-Short's, Cuff Size: Adult Large)   Pulse 87   Temp 98.1  F (36.7  C) (Oral)   Resp 16   SpO2 96%     Lien Hathaway RN

## 2023-06-02 NOTE — PROGRESS NOTES
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 She had multiple thromboembolic events in 2021 despite adherent anticoagulation use (though warfarin was perpetually low) and there are concerns for chronic thromboembolic disease but did not have pulmonary HTN on a November 2021 cath. She is maintained on chronic PO opioids and twice-weekly infusion visits (since 1/24/22) but has been able to be maintained on this regimen and has stayed out of the ED most of the time with even rarer admissions.     Interval History:  Jennifer is seen for routine follow-up. She was hospitalized 5/10-5/16 for acute hypoxic respiratory failure. V/Q was negative for PE. Her dyspnea/hypoxia was believed to be related to an asthma exacerbation. She notes it took a few weeks for her breathing to improve following discharge but things now feel better. She is using her inhalers routinely as prescribed. Pain today is significant, rated 10/10. She will receive IVF/pain medication after the visit. At home she is using her oxycodone only as needed. There are many days where she isn't using it at all. If she is having a pain flare, she will take it every 4 hours. She is willing to taper her prescription further today. She has been trying to stay out of the heat to avoid dehydration which usually contributes to her increased pain. She has also been experiencing rhinitis and some eyelid swelling which she attributes to seasonal allergies. She typically treats symptoms with  "Benadryl and is not currently on an allergy medication.     Sickle Cell Disease Comprehensive Checklist    Bone Health/Avascular Necrosis Screening/Symptoms (each visit): no new concerns today    Leg Ulcer evaluation (every visit): none    Hypertension (every visit):stable 3/10/23    Last pulmonary evaluation (asthma, AMAN, pulm HTN): 9/28/22    Stroke/silent cerebral infarct Hx (Y/N): Yes TIA ~2014, first event ~age 1 with full stroke and R sided weakness    Last PCP Visit: 3/6/23    Vaccines:  ? PCV13: 5/13/19  ? Pneumovax (PPSV23): 3/04, 10/09, 7/12/19 (next due 7/2024)  ? Menactra: 4/2010, 9/2015 (MCV done 8/16/21)  ? Influenza: 11/17/2022     Audiology (chelation): done 6/2020, normal.. However, on 6/7, \"While there is no significant change in grade on the CTCAE 4.03 Scale, there were hearing changes bilaterally for both standard and extended high frequency audiometry.  Will need to determine if an ENT consult is advised due to the asymmetry in extended high frequency testing.\"     Plan last reviewed with patient: 7/11/22    Patient background: 23 yo F, enjoys movies and kids though there are times where she does not really want to talk to people. Does not have a lot of social support at home.     Sickle Cell Disease History  Primary Hematologist Team: Jose Rafael Duncan  PCP: none  Genotype: SS  Acute Pain Crisis Treatment: (avoiding IV opioids for now, which she has agreed to)    ER   o Oxycodone 15-20 mg x1  o Ketamine 4mg IV x 2--helps with opioid sparing  o Toradol 15 mg IV x1   o Maintenance IV fluids with LR  o Other: Zofran 8 mg IV PRN nausea    Inpatient:  o Home oxycodone/Oxycontin regimen, though home oxycodone dosing could be increased to 20 mg to start  o PCA plan:   - None for now  o Other Medications: Zofran  o She has had success with ketamine starting at 4mg/h and advancing only to 6mg/h, as 8mg/h made her feel quite poorly..  o ASA  o Supportive Care: Docusate, Senna  Chronic Pain " Medications:    Home regimen Oxycontin 10 mg q12h, oxycodone 15 mg p.o. q.4-6 hours p.r.n. breakthrough pain.  She also continues on Voltaren gel, and Zoloft among other medications.    -Also benefits from mental health visits, acupuncture  Baseline Hemoglobin: 7 g/dl without chronic transfusions  Hydroxyurea use: Yes  H/O blood transfusions: Yes, several (iron overload) Most recent 11/20/2021    H/O Transfusion Reactions: no    Antibodies:none  H/O Acute Chest Syndrome: Yes    Last episode:9/05/22 (previously 4/26/21, 10/2019)     ICU/intubation: not with 9/2022 admission  H/O Stroke: Yes (managed with chronic transfusions in the past, stopped late Spring 2020)  H/O VTE: Yes (2/2021)  H/O Cholecystectomy or Splenectomy: no  H/O Asthma, Pulm HTN, AVN, Leg Ulcers, Nephropathy, Retinopathy, etc: Iron overload, asthma, chronic lung disease, physical limitations from early stroke    ---------------------------------------  Jennifer Cervantes's Goals (discussed today, 6/5/23)    1-3 month goal:  Continue to look for a job    6 month goal:  Save money for ultimate goal of moving out    12 month goal:  Move into her own place     Disease-specific goal(s):  Continue to wean oxycodone down  ---------------------------------------      Current Outpatient Medications   Medication Sig Dispense Refill     acetaminophen (TYLENOL) 325 MG tablet Take 2 tablets (650 mg) by mouth every 6 hours as needed for mild pain 120 tablet 3     albuterol (PROAIR HFA/PROVENTIL HFA/VENTOLIN HFA) 108 (90 Base) MCG/ACT inhaler Inhale 2 puffs into the lungs every 6 hours as needed for shortness of breath or wheezing 8.5 g 3     albuterol (PROVENTIL) (2.5 MG/3ML) 0.083% neb solution Take 2 vials (5 mg) by nebulization every 6 hours as needed for shortness of breath or wheezing 90 mL 3     aspirin (ASA) 81 MG chewable tablet Take 1 tablet (81 mg) by mouth 2 times daily 60 tablet 11     budesonide-formoterol (SYMBICORT) 160-4.5 MCG/ACT Inhaler Inhale 2  puffs into the lungs 2 times daily 10.2 g 3     cetirizine (ZYRTEC) 10 MG tablet Take 1 tablet (10 mg) by mouth daily 30 tablet 1     deferasirox (JADENU) 360 MG tablet Take 4 tablets (1,440 mg) by mouth every evening 120 tablet 4     diphenhydrAMINE (BENADRYL) 25 MG capsule Take 1 capsule (25 mg) by mouth every 6 hours as needed for itching or allergies 30 capsule 0     FLUoxetine (PROZAC) 10 MG capsule Take 1 capsule (10 mg) by mouth daily For two weeks, then increase to 20 mg if 10 mg not effective 40 capsule 1     Hydroxyurea 1000 MG TABS Take 3,000 mg by mouth daily 90 tablet 3     oxyCODONE IR (ROXICODONE) 15 MG tablet Take 1 tablet (15 mg) by mouth every 4 hours as needed for severe pain Goal 4 per day. Max 6 per day. 22 tablet 0     traZODone (DESYREL) 50 MG tablet Take 1 tablet (50 mg) by mouth At Bedtime 30 tablet 1     EPINEPHrine (ANY BX GENERIC EQUIV) 0.3 MG/0.3ML injection 2-pack Inject 0.3 mLs (0.3 mg) into the muscle as needed for anaphylaxis (Patient not taking: Reported on 3/10/2023) 1 each 1     naloxone (NARCAN) 4 MG/0.1ML nasal spray Spray 4 mg into one nostril alternating nostrils as needed for opioid reversal every 2-3 minutes until assistance arrives (Patient not taking: Reported on 5/18/2023)       ondansetron (ZOFRAN) 8 MG tablet Take 1 tablet (8 mg) by mouth every 8 hours as needed (Patient not taking: Reported on 6/5/2023) 30 tablet 1       Past Medical History  Past Medical History:   Diagnosis Date     Anxiety      Bleeding disorder (H)      Blood clotting disorder (H)      Cerebral infarction (H) 2015     Cognitive developmental delay     low IQ. Please recognize when managing pain and planning with her     Depressive disorder      Hemiplegia and hemiparesis following cerebral infarction affecting right dominant side (H)     right hand contractures     Iron overload due to repeated red blood cell transfusions      Migraines      Multiple subsegmental pulmonary emboli without acute cor  pulmonale (H) 02/01/2021     Oppositional defiant behavior      Presence of intrauterine contraceptive device 2/18/2020     Superficial venous thrombosis of arm, right 03/25/2021     Uncomplicated asthma      Past Surgical History:   Procedure Laterality Date     AS INSERT TUNNELED CV 2 CATH W/O PORT/PUMP       CHOLECYSTECTOMY       CV RIGHT HEART CATH MEASUREMENTS RECORDED N/A 11/18/2021    Procedure: Right Heart Cath;  Surgeon: Jackson Stauffer MD;  Location:  HEART CARDIAC CATH LAB     INSERT PORT VASCULAR ACCESS Left 4/21/2021    Procedure: INSERTION, VASCULAR ACCESS PORT (NOT SURE ON SIDE UNTIL REMOVAL);  Surgeon: Rajan More MD;  Location: [facility] OR     IR CHEST PORT PLACEMENT > 5 YRS OF AGE  4/21/2021     IR CVC NON TUNNEL LINE REMOVAL  5/6/2021     IR CVC NON TUNNEL PLACEMENT > 5 YRS  04/07/2020     IR CVC NON TUNNEL PLACEMENT > 5 YRS  4/30/2021     IR CVC NON TUNNEL PLACEMENT > 5 YRS  9/7/2022     IR PORT REMOVAL LEFT  4/21/2021     REMOVE PORT VASCULAR ACCESS Left 4/21/2021    Procedure: REMOVAL, VASCULAR ACCESS PORT LEFT;  Surgeon: Rajan More MD;  Location: [facility] OR     REPAIR TENDON ELBOW Right 10/02/2019    Procedure: Right Elbow Flexor Lengthening, Flexor Pronator Slide Of Wrist and Finger, Thumb Adductor Lengthening;  Surgeon: Anai Franco MD;  Location: [facility] OR     TONSILLECTOMY Bilateral 10/02/2019    Procedure: Bilateral Tonsillectomy;  Surgeon: Farhana Guy MD;  Location: [facility] OR     ZZC BREAST REDUCTION (INCLUDES LIPO) TIER 3 Bilateral 04/23/2019     Allergies   Allergen Reactions     Contrast Dye      Hives and breathing issues     Fish-Derived Products Hives     Seafood Hives     Gadolinium      Iodinated Contrast Media      Social History   Social History     Tobacco Use     Smoking status: Never     Smokeless tobacco: Never   Substance Use Topics     Alcohol use: Not Currently     Alcohol/week: 0.0 standard drinks of alcohol     Drug use: Never    Still living at  home with mom and extended family.     Past medical history and social history were reviewed.    Physical Examination:  /71 (BP Location: Right arm, Patient Position: Sitting, Cuff Size: Adult Regular)   Pulse 85   Temp 98.7  F (37.1  C) (Oral)   Resp 18   Wt 67.9 kg (149 lb 11.2 oz)   LMP  (LMP Unknown)   SpO2 99%   BMI 25.70 kg/m    Wt Readings from Last 10 Encounters:   06/05/23 67.9 kg (149 lb 11.2 oz)   05/19/23 69.3 kg (152 lb 12.8 oz)   05/13/23 72.2 kg (159 lb 3.2 oz)   05/03/23 74.8 kg (164 lb 14.5 oz)   04/30/23 74.8 kg (165 lb)   04/14/23 69.4 kg (153 lb 1.6 oz)   03/27/23 70.8 kg (156 lb)   03/27/23 69.8 kg (153 lb 14.4 oz)   03/16/23 68.1 kg (150 lb 3.2 oz)   03/10/23 67.6 kg (149 lb)     General: Pleasant female, NAD  Eyes: EOMI, PERRL. Mild. scleral icterus.  Respiratory: CTA bilaterally. No wheezing  Cardiac: RRR, no m/g/r. No peripheral edema.  MSK: Contractured right arm and hand 2/2 stroke.  Neurologic: Grossly nonfocal. A/O x 4.  Skin: No rashes, petechiae, or bruising noted on exposed skin.    Laboratory Data:  Most Recent 3 CBC's:  Recent Labs   Lab Test 06/05/23  0910 05/17/23  1048 05/16/23  0610 05/12/23  0731 05/11/23  0648   WBC 11.1* 13.5* 13.0*   < > 14.8*   HGB 7.5* 6.7* 6.1*   < > 6.4*   MCV 81 77* 84   < > 88   * 536* 343   < > 474*   ANEUTAUTO 7.8 9.6*  --   --  12.8*    < > = values in this interval not displayed.    Most Recent 3 BMP's:  Recent Labs   Lab Test 06/05/23  0910 05/17/23  1048 05/13/23  0605 05/12/23  0731 05/11/23  0648 05/10/23  1948    139 141 141 139 135*   POTASSIUM 3.7 4.0 4.2 4.3 4.3 3.7   CHLORIDE 106 110* 112* 111* 109* 104   CO2 22 19* 20* 20* 19* 19*   BUN 5.7* 6.5 10.3 8.7 6.3 5.5*   CR 0.53 0.51 0.62 0.67 0.53 0.58   ANIONGAP 8 10 9 10 11 12   MICAH 9.1 8.9 8.1* 8.3* 8.9 8.9   GLC 93 93 96 100* 137* 78   PROTTOTAL 7.6  --   --   --  7.2 7.4   ALBUMIN 4.5  --  3.8 4.0 4.2 4.4    Most Recent 2 LFT's:  Recent Labs   Lab Test  06/05/23  0910 05/14/23  0633 05/12/23  0731 05/11/23  0648   AST 29  --   --  27   ALT 18  --   --  12   ALKPHOS 67  --   --  71   BILITOTAL 2.2* 1.5*   < > 1.8*    < > = values in this interval not displayed.      Lab Results   Component Value Date    RETP 16.2 (H) 06/05/2023      Assessment and Plan:  1. Sickle Cell HgbSS Disease  2. Recent hospitalization with acute chest, pulmonary edema (1/2023)  3. Chronic Pain  4. 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 History of CVA  7. Hearing loss    Jennifer is doing well following most recent admission for hypoxia and asthma exacerbation. V/Q scan at that time was negative for PE. Historically PE evaluation has been completed with V/Q due to concern for contrast allergy. I am very pleased with her continued taper of oxycodone and reported use at home. She is going many days without taking medication when she is experiencing no pain. We did not adjust her prescription last month when she was acutely ill but will decreased further today, reducing her total pills dispensed to 22 from 25.     Labs today are stable with hemoglobin 7.6 and WBC slightly elevated at 11.1. She continues to adhere to twice weekly infusion visits and will receive IVF and pain medication today in infusion. Crizanlizumab infusions have been resume which she will continue on Fridays.     Desferal is currently on hold due to desire to stop/hold infusion due to decreased quality of life related to the amount of time she needed to be connected for infusion. Remains on Jadenu. A repeat Ferriscan was previously requested but has not yet been scheduled. I will request this again today.     We discussed a trial of cetirizine for seasonal allergies rather than treating symptoms with Benadryl which she is agreeable to.    Plan:  -Continue Hydrea to 3000mg daily to help lessen frequency of sickle cell pain. Refilled today.  -Continue slow taper of oxycodone.  Will decrease to 22 tablets per fill from 25. She can continue the frequency at every 4 hours with a max of 6 tablets per day but with the decreased tablets per refill should aim for a max of 4 tablets per day. She can self-reduce infusion days/week and we will continue to keep the cap at 2/week for now.  -Continue infusion center visits limited to two times per week (Mondays and Fridays ideally although still needs to call to request). Continue diligent home management with current medications, heat, rest, compression, warm baths.   -Begin cetirizine 10 mg daily for seasonal allergies.  -If unable to manage at home can go to ED but continue to not do IV narcotics in the emergency room. Ketamine previously added to pain plan in ED  - Desferal infusions on hold. Continue Jadenu and check Ferriscan. Will likely need to resume infusions in the future.   -Continue aspirin BID  -RTC with me  7/14 (coordinate with [facility] infusion) and 8/11. Follow-up with Dr. Duncan in 3 months.    40 minutes spent on the date of the encounter doing chart review, review of test results, interpretation of tests, patient visit and documentation     Patricia Mantilla, CNP

## 2023-06-05 ENCOUNTER — INFUSION THERAPY VISIT (OUTPATIENT)
Dept: TRANSPLANT | Facility: CLINIC | Age: 23
End: 2023-06-05
Attending: PEDIATRICS
Payer: COMMERCIAL

## 2023-06-05 ENCOUNTER — ONCOLOGY VISIT (OUTPATIENT)
Dept: ONCOLOGY | Facility: CLINIC | Age: 23
End: 2023-06-05
Attending: REGISTERED NURSE
Payer: COMMERCIAL

## 2023-06-05 ENCOUNTER — NURSE TRIAGE (OUTPATIENT)
Dept: ONCOLOGY | Facility: CLINIC | Age: 23
End: 2023-06-05
Payer: COMMERCIAL

## 2023-06-05 ENCOUNTER — APPOINTMENT (OUTPATIENT)
Dept: LAB | Facility: CLINIC | Age: 23
End: 2023-06-05
Attending: REGISTERED NURSE
Payer: COMMERCIAL

## 2023-06-05 VITALS
WEIGHT: 149.7 LBS | OXYGEN SATURATION: 99 % | HEART RATE: 85 BPM | SYSTOLIC BLOOD PRESSURE: 108 MMHG | TEMPERATURE: 98.7 F | RESPIRATION RATE: 18 BRPM | DIASTOLIC BLOOD PRESSURE: 71 MMHG | BODY MASS INDEX: 25.7 KG/M2

## 2023-06-05 DIAGNOSIS — I82.611 SUPERFICIAL VENOUS THROMBOSIS OF ARM, RIGHT: ICD-10-CM

## 2023-06-05 DIAGNOSIS — E83.111 IRON OVERLOAD DUE TO REPEATED RED BLOOD CELL TRANSFUSIONS: ICD-10-CM

## 2023-06-05 DIAGNOSIS — G81.10 SPASTIC HEMIPLEGIA, UNSPECIFIED ETIOLOGY, UNSPECIFIED LATERALITY (H): Primary | ICD-10-CM

## 2023-06-05 DIAGNOSIS — D57.1 HB-SS DISEASE WITHOUT CRISIS (H): Primary | ICD-10-CM

## 2023-06-05 DIAGNOSIS — D57.00 SICKLE CELL PAIN CRISIS (H): ICD-10-CM

## 2023-06-05 DIAGNOSIS — J30.2 SEASONAL ALLERGIES: ICD-10-CM

## 2023-06-05 LAB
ALBUMIN SERPL BCG-MCNC: 4.5 G/DL (ref 3.5–5.2)
ALP SERPL-CCNC: 67 U/L (ref 35–104)
ALT SERPL W P-5'-P-CCNC: 18 U/L (ref 10–35)
ANION GAP SERPL CALCULATED.3IONS-SCNC: 8 MMOL/L (ref 7–15)
AST SERPL W P-5'-P-CCNC: 29 U/L (ref 10–35)
BASOPHILS # BLD AUTO: 0 10E3/UL (ref 0–0.2)
BASOPHILS NFR BLD AUTO: 0 %
BILIRUB SERPL-MCNC: 2.2 MG/DL
BUN SERPL-MCNC: 5.7 MG/DL (ref 6–20)
CALCIUM SERPL-MCNC: 9.1 MG/DL (ref 8.6–10)
CHLORIDE SERPL-SCNC: 106 MMOL/L (ref 98–107)
CREAT SERPL-MCNC: 0.53 MG/DL (ref 0.51–0.95)
DEPRECATED HCO3 PLAS-SCNC: 22 MMOL/L (ref 22–29)
EOSINOPHIL # BLD AUTO: 0.9 10E3/UL (ref 0–0.7)
EOSINOPHIL NFR BLD AUTO: 7 %
ERYTHROCYTE [DISTWIDTH] IN BLOOD BY AUTOMATED COUNT: 22.4 % (ref 10–15)
FERRITIN SERPL-MCNC: 4702 NG/ML (ref 6–175)
GFR SERPL CREATININE-BSD FRML MDRD: >90 ML/MIN/1.73M2
GLUCOSE SERPL-MCNC: 93 MG/DL (ref 70–99)
HCT VFR BLD AUTO: 21.7 % (ref 35–47)
HGB BLD-MCNC: 7.5 G/DL (ref 11.7–15.7)
IMM GRANULOCYTES # BLD: 0 10E3/UL
IMM GRANULOCYTES NFR BLD: 0 %
LYMPHOCYTES # BLD AUTO: 2.1 10E3/UL (ref 0.8–5.3)
LYMPHOCYTES NFR BLD AUTO: 17 %
MCH RBC QN AUTO: 28.1 PG (ref 26.5–33)
MCHC RBC AUTO-ENTMCNC: 34.6 G/DL (ref 31.5–36.5)
MCV RBC AUTO: 81 FL (ref 78–100)
MONOCYTES # BLD AUTO: 0.9 10E3/UL (ref 0–1.3)
MONOCYTES NFR BLD AUTO: 8 %
NEUTROPHILS # BLD AUTO: 7.8 10E3/UL (ref 1.6–8.3)
NEUTROPHILS NFR BLD AUTO: 68 %
NRBC # BLD AUTO: 0 10E3/UL
NRBC BLD AUTO-RTO: 1 /100
PLATELET # BLD AUTO: 484 10E3/UL (ref 150–450)
POTASSIUM SERPL-SCNC: 3.7 MMOL/L (ref 3.4–5.3)
PROT SERPL-MCNC: 7.6 G/DL (ref 6.4–8.3)
RBC # BLD AUTO: 2.67 10E6/UL (ref 3.8–5.2)
RETICS # AUTO: 0.43 10E6/UL (ref 0.03–0.1)
RETICS/RBC NFR AUTO: 16.2 % (ref 0.5–2)
SODIUM SERPL-SCNC: 136 MMOL/L (ref 136–145)
WBC # BLD AUTO: 11.1 10E3/UL (ref 4–11)

## 2023-06-05 PROCEDURE — 80053 COMPREHEN METABOLIC PANEL: CPT | Performed by: REGISTERED NURSE

## 2023-06-05 PROCEDURE — 36591 DRAW BLOOD OFF VENOUS DEVICE: CPT | Performed by: REGISTERED NURSE

## 2023-06-05 PROCEDURE — 96361 HYDRATE IV INFUSION ADD-ON: CPT

## 2023-06-05 PROCEDURE — 99215 OFFICE O/P EST HI 40 MIN: CPT | Performed by: REGISTERED NURSE

## 2023-06-05 PROCEDURE — G0463 HOSPITAL OUTPT CLINIC VISIT: HCPCS | Mod: 25 | Performed by: REGISTERED NURSE

## 2023-06-05 PROCEDURE — 250N000011 HC RX IP 250 OP 636: Performed by: REGISTERED NURSE

## 2023-06-05 PROCEDURE — 96376 TX/PRO/DX INJ SAME DRUG ADON: CPT

## 2023-06-05 PROCEDURE — 82728 ASSAY OF FERRITIN: CPT | Performed by: REGISTERED NURSE

## 2023-06-05 PROCEDURE — 250N000011 HC RX IP 250 OP 636: Performed by: PEDIATRICS

## 2023-06-05 PROCEDURE — 85025 COMPLETE CBC W/AUTO DIFF WBC: CPT | Performed by: REGISTERED NURSE

## 2023-06-05 PROCEDURE — 85045 AUTOMATED RETICULOCYTE COUNT: CPT | Performed by: REGISTERED NURSE

## 2023-06-05 PROCEDURE — 96374 THER/PROPH/DIAG INJ IV PUSH: CPT

## 2023-06-05 PROCEDURE — 258N000003 HC RX IP 258 OP 636: Performed by: PEDIATRICS

## 2023-06-05 RX ORDER — OXYCODONE HYDROCHLORIDE 15 MG/1
15 TABLET ORAL EVERY 4 HOURS PRN
Qty: 22 TABLET | Refills: 0 | Status: SHIPPED | OUTPATIENT
Start: 2023-06-05 | End: 2023-06-12

## 2023-06-05 RX ORDER — HEPARIN SODIUM (PORCINE) LOCK FLUSH IV SOLN 100 UNIT/ML 100 UNIT/ML
5 SOLUTION INTRAVENOUS ONCE
Status: COMPLETED | OUTPATIENT
Start: 2023-06-05 | End: 2023-06-05

## 2023-06-05 RX ORDER — DEFERASIROX 360 MG/1
1440 TABLET, FILM COATED ORAL EVERY EVENING
Qty: 120 TABLET | Refills: 4 | Status: SHIPPED | OUTPATIENT
Start: 2023-06-05 | End: 2023-07-14

## 2023-06-05 RX ORDER — HEPARIN SODIUM (PORCINE) LOCK FLUSH IV SOLN 100 UNIT/ML 100 UNIT/ML
5 SOLUTION INTRAVENOUS
Status: CANCELLED | OUTPATIENT
Start: 2023-07-01

## 2023-06-05 RX ORDER — DIPHENHYDRAMINE HCL 25 MG
25 CAPSULE ORAL
Status: DISCONTINUED | OUTPATIENT
Start: 2023-06-05 | End: 2023-06-05 | Stop reason: HOSPADM

## 2023-06-05 RX ORDER — HEPARIN SODIUM,PORCINE 10 UNIT/ML
5 VIAL (ML) INTRAVENOUS
Status: CANCELLED | OUTPATIENT
Start: 2023-07-01

## 2023-06-05 RX ORDER — ONDANSETRON 4 MG/1
8 TABLET, FILM COATED ORAL
Status: CANCELLED
Start: 2023-07-01

## 2023-06-05 RX ORDER — HEPARIN SODIUM (PORCINE) LOCK FLUSH IV SOLN 100 UNIT/ML 100 UNIT/ML
5 SOLUTION INTRAVENOUS
Status: DISCONTINUED | OUTPATIENT
Start: 2023-06-05 | End: 2023-06-05 | Stop reason: HOSPADM

## 2023-06-05 RX ORDER — ASPIRIN 81 MG/1
81 TABLET, CHEWABLE ORAL 2 TIMES DAILY
Qty: 60 TABLET | Refills: 11 | Status: SHIPPED | OUTPATIENT
Start: 2023-06-05 | End: 2023-07-14

## 2023-06-05 RX ORDER — DIPHENHYDRAMINE HCL 25 MG
25 CAPSULE ORAL
Status: CANCELLED
Start: 2023-07-01

## 2023-06-05 RX ORDER — CETIRIZINE HYDROCHLORIDE 10 MG/1
10 TABLET ORAL DAILY
Qty: 30 TABLET | Refills: 1 | Status: SHIPPED | OUTPATIENT
Start: 2023-06-05 | End: 2024-05-16

## 2023-06-05 RX ORDER — DIPHENHYDRAMINE HCL 25 MG
25 CAPSULE ORAL EVERY 6 HOURS PRN
Qty: 30 CAPSULE | Refills: 0 | Status: SHIPPED | OUTPATIENT
Start: 2023-06-05 | End: 2023-07-14

## 2023-06-05 RX ADMIN — HYDROMORPHONE HYDROCHLORIDE 1 MG: 1 INJECTION, SOLUTION INTRAMUSCULAR; INTRAVENOUS; SUBCUTANEOUS at 11:07

## 2023-06-05 RX ADMIN — Medication 5 ML: at 09:04

## 2023-06-05 RX ADMIN — Medication 5 ML: at 12:07

## 2023-06-05 RX ADMIN — HYDROMORPHONE HYDROCHLORIDE 1 MG: 1 INJECTION, SOLUTION INTRAMUSCULAR; INTRAVENOUS; SUBCUTANEOUS at 12:07

## 2023-06-05 RX ADMIN — SODIUM CHLORIDE, POTASSIUM CHLORIDE, SODIUM LACTATE AND CALCIUM CHLORIDE 1000 ML: 600; 310; 30; 20 INJECTION, SOLUTION INTRAVENOUS at 10:05

## 2023-06-05 RX ADMIN — HYDROMORPHONE HYDROCHLORIDE 1 MG: 1 INJECTION, SOLUTION INTRAMUSCULAR; INTRAVENOUS; SUBCUTANEOUS at 10:06

## 2023-06-05 ASSESSMENT — PAIN SCALES - GENERAL: PAINLEVEL: WORST PAIN (10)

## 2023-06-05 NOTE — LETTER
6/5/2023         RE: Jennifer Cervantes  [address]  [facility] [address]        Dear Colleague,    Thank you for referring your patient, Jennifer Cervantes, to the [facility]. Please see a copy of my visit note below.    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 She had multiple thromboembolic events in 2021 despite adherent anticoagulation use (though warfarin was perpetually low) and there are concerns for chronic thromboembolic disease but did not have pulmonary HTN on a November 2021 cath. She is maintained on chronic PO opioids and twice-weekly infusion visits (since 1/24/22) but has been able to be maintained on this regimen and has stayed out of the ED most of the time with even rarer admissions.     Interval History:  Jennifer is seen for routine follow-up. She was hospitalized 5/10-5/16 for acute hypoxic respiratory failure. V/Q was negative for PE. Her dyspnea/hypoxia was believed to be related to an asthma exacerbation. She notes it took a few weeks for her breathing to improve following discharge but things now feel better. She is using her inhalers routinely as prescribed. Pain today is significant, rated 10/10. She will receive IVF/pain medication after the visit. At home she is using her oxycodone only as needed. There are many days where she isn't using it at all. If she is having a pain flare, she will take it every 4 hours. She is willing to taper her prescription further today. She has  "been trying to stay out of the heat to avoid dehydration which usually contributes to her increased pain. She has also been experiencing rhinitis and some eyelid swelling which she attributes to seasonal allergies. She typically treats symptoms with Benadryl and is not currently on an allergy medication.     Sickle Cell Disease Comprehensive Checklist    Bone Health/Avascular Necrosis Screening/Symptoms (each visit): no new concerns today    Leg Ulcer evaluation (every visit): none    Hypertension (every visit):stable 3/10/23    Last pulmonary evaluation (asthma, AMAN, pulm HTN): 9/28/22    Stroke/silent cerebral infarct Hx (Y/N): Yes TIA ~2014, first event ~age 1 with full stroke and R sided weakness    Last PCP Visit: 3/6/23    Vaccines:  ? PCV13: 5/13/19  ? Pneumovax (PPSV23): 3/04, 10/09, 7/12/19 (next due 7/2024)  ? Menactra: 4/2010, 9/2015 (MCV done 8/16/21)  ? Influenza: 11/17/2022     Audiology (chelation): done 6/2020, normal.. However, on 6/7, \"While there is no significant change in grade on the CTCAE 4.03 Scale, there were hearing changes bilaterally for both standard and extended high frequency audiometry.  Will need to determine if an ENT consult is advised due to the asymmetry in extended high frequency testing.\"     Plan last reviewed with patient: 7/11/22    Patient background: 23 yo F, enjoys movies and kids though there are times where she does not really want to talk to people. Does not have a lot of social support at home.     Sickle Cell Disease History  Primary Hematologist Team: Jose Rafael Duncan  PCP: none  Genotype: SS  Acute Pain Crisis Treatment: (avoiding IV opioids for now, which she has agreed to)    ER   o Oxycodone 15-20 mg x1  o Ketamine 4mg IV x 2--helps with opioid sparing  o Toradol 15 mg IV x1   o Maintenance IV fluids with LR  o Other: Zofran 8 mg IV PRN nausea    Inpatient:  o Home oxycodone/Oxycontin regimen, though home oxycodone dosing could be increased to 20 mg to " start  o PCA plan:   - None for now  o Other Medications: Zofran  o She has had success with ketamine starting at 4mg/h and advancing only to 6mg/h, as 8mg/h made her feel quite poorly..  o ASA  o Supportive Care: Docusate, Senna  Chronic Pain Medications:    Home regimen Oxycontin 10 mg q12h, oxycodone 15 mg p.o. q.4-6 hours p.r.n. breakthrough pain.  She also continues on Voltaren gel, and Zoloft among other medications.    -Also benefits from mental health visits, acupuncture  Baseline Hemoglobin: 7 g/dl without chronic transfusions  Hydroxyurea use: Yes  H/O blood transfusions: Yes, several (iron overload) Most recent 11/20/2021    H/O Transfusion Reactions: no    Antibodies:none  H/O Acute Chest Syndrome: Yes    Last episode:9/05/22 (previously 4/26/21, 10/2019)     ICU/intubation: not with 9/2022 admission  H/O Stroke: Yes (managed with chronic transfusions in the past, stopped late Spring 2020)  H/O VTE: Yes (2/2021)  H/O Cholecystectomy or Splenectomy: no  H/O Asthma, Pulm HTN, AVN, Leg Ulcers, Nephropathy, Retinopathy, etc: Iron overload, asthma, chronic lung disease, physical limitations from early stroke    ---------------------------------------  Jennifer Cervantes's Goals (discussed today, 6/5/23)    1-3 month goal:  Continue to look for a job    6 month goal:  Save money for ultimate goal of moving out    12 month goal:  Move into her own place     Disease-specific goal(s):  Continue to wean oxycodone down  ---------------------------------------      Current Outpatient Medications   Medication Sig Dispense Refill     acetaminophen (TYLENOL) 325 MG tablet Take 2 tablets (650 mg) by mouth every 6 hours as needed for mild pain 120 tablet 3     albuterol (PROAIR HFA/PROVENTIL HFA/VENTOLIN HFA) 108 (90 Base) MCG/ACT inhaler Inhale 2 puffs into the lungs every 6 hours as needed for shortness of breath or wheezing 8.5 g 3     albuterol (PROVENTIL) (2.5 MG/3ML) 0.083% neb solution Take 2 vials (5 mg) by  nebulization every 6 hours as needed for shortness of breath or wheezing 90 mL 3     aspirin (ASA) 81 MG chewable tablet Take 1 tablet (81 mg) by mouth 2 times daily 60 tablet 11     budesonide-formoterol (SYMBICORT) 160-4.5 MCG/ACT Inhaler Inhale 2 puffs into the lungs 2 times daily 10.2 g 3     cetirizine (ZYRTEC) 10 MG tablet Take 1 tablet (10 mg) by mouth daily 30 tablet 1     deferasirox (JADENU) 360 MG tablet Take 4 tablets (1,440 mg) by mouth every evening 120 tablet 4     diphenhydrAMINE (BENADRYL) 25 MG capsule Take 1 capsule (25 mg) by mouth every 6 hours as needed for itching or allergies 30 capsule 0     FLUoxetine (PROZAC) 10 MG capsule Take 1 capsule (10 mg) by mouth daily For two weeks, then increase to 20 mg if 10 mg not effective 40 capsule 1     Hydroxyurea 1000 MG TABS Take 3,000 mg by mouth daily 90 tablet 3     oxyCODONE IR (ROXICODONE) 15 MG tablet Take 1 tablet (15 mg) by mouth every 4 hours as needed for severe pain Goal 4 per day. Max 6 per day. 22 tablet 0     traZODone (DESYREL) 50 MG tablet Take 1 tablet (50 mg) by mouth At Bedtime 30 tablet 1     EPINEPHrine (ANY BX GENERIC EQUIV) 0.3 MG/0.3ML injection 2-pack Inject 0.3 mLs (0.3 mg) into the muscle as needed for anaphylaxis (Patient not taking: Reported on 3/10/2023) 1 each 1     naloxone (NARCAN) 4 MG/0.1ML nasal spray Spray 4 mg into one nostril alternating nostrils as needed for opioid reversal every 2-3 minutes until assistance arrives (Patient not taking: Reported on 5/18/2023)       ondansetron (ZOFRAN) 8 MG tablet Take 1 tablet (8 mg) by mouth every 8 hours as needed (Patient not taking: Reported on 6/5/2023) 30 tablet 1       Past Medical History  Past Medical History:   Diagnosis Date     Anxiety      Bleeding disorder (H)      Blood clotting disorder (H)      Cerebral infarction (H) 2015     Cognitive developmental delay     low IQ. Please recognize when managing pain and planning with her     Depressive disorder       Hemiplegia and hemiparesis following cerebral infarction affecting right dominant side (H)     right hand contractures     Iron overload due to repeated red blood cell transfusions      Migraines      Multiple subsegmental pulmonary emboli without acute cor pulmonale (H) 02/01/2021     Oppositional defiant behavior      Presence of intrauterine contraceptive device 2/18/2020     Superficial venous thrombosis of arm, right 03/25/2021     Uncomplicated asthma      Past Surgical History:   Procedure Laterality Date     AS INSERT TUNNELED CV 2 CATH W/O PORT/PUMP       CHOLECYSTECTOMY       CV RIGHT HEART CATH MEASUREMENTS RECORDED N/A 11/18/2021    Procedure: Right Heart Cath;  Surgeon: Jackson Stauffer MD;  Location:  HEART CARDIAC CATH LAB     INSERT PORT VASCULAR ACCESS Left 4/21/2021    Procedure: INSERTION, VASCULAR ACCESS PORT (NOT SURE ON SIDE UNTIL REMOVAL);  Surgeon: Rajan More MD;  Location: [facility] OR     IR CHEST PORT PLACEMENT > 5 YRS OF AGE  4/21/2021     IR CVC NON TUNNEL LINE REMOVAL  5/6/2021     IR CVC NON TUNNEL PLACEMENT > 5 YRS  04/07/2020     IR CVC NON TUNNEL PLACEMENT > 5 YRS  4/30/2021     IR CVC NON TUNNEL PLACEMENT > 5 YRS  9/7/2022     IR PORT REMOVAL LEFT  4/21/2021     REMOVE PORT VASCULAR ACCESS Left 4/21/2021    Procedure: REMOVAL, VASCULAR ACCESS PORT LEFT;  Surgeon: Rajan More MD;  Location: [facility] OR     REPAIR TENDON ELBOW Right 10/02/2019    Procedure: Right Elbow Flexor Lengthening, Flexor Pronator Slide Of Wrist and Finger, Thumb Adductor Lengthening;  Surgeon: Anai Franco MD;  Location: [facility] OR     TONSILLECTOMY Bilateral 10/02/2019    Procedure: Bilateral Tonsillectomy;  Surgeon: Farhana Guy MD;  Location: [facility] OR     ZZC BREAST REDUCTION (INCLUDES LIPO) TIER 3 Bilateral 04/23/2019     Allergies   Allergen Reactions     Contrast Dye      Hives and breathing issues     Fish-Derived Products Hives     Seafood Hives     Gadolinium      Iodinated  [employer]      Social History   Social History     Tobacco Use     Smoking status: Never     Smokeless tobacco: Never   Substance Use Topics     Alcohol use: Not Currently     Alcohol/week: 0.0 standard drinks of alcohol     Drug use: Never    Still living at home with mom and extended family.     Past medical history and social history were reviewed.    Physical Examination:  /71 (BP Location: Right arm, Patient Position: Sitting, Cuff Size: Adult Regular)   Pulse 85   Temp 98.7  F (37.1  C) (Oral)   Resp 18   Wt 67.9 kg (149 lb 11.2 oz)   LMP  (LMP Unknown)   SpO2 99%   BMI 25.70 kg/m    Wt Readings from Last 10 Encounters:   06/05/23 67.9 kg (149 lb 11.2 oz)   05/19/23 69.3 kg (152 lb 12.8 oz)   05/13/23 72.2 kg (159 lb 3.2 oz)   05/03/23 74.8 kg (164 lb 14.5 oz)   04/30/23 74.8 kg (165 lb)   04/14/23 69.4 kg (153 lb 1.6 oz)   03/27/23 70.8 kg (156 lb)   03/27/23 69.8 kg (153 lb 14.4 oz)   03/16/23 68.1 kg (150 lb 3.2 oz)   03/10/23 67.6 kg (149 lb)     General: Pleasant female, NAD  Eyes: EOMI, PERRL. Mild. scleral icterus.  Respiratory: CTA bilaterally. No wheezing  Cardiac: RRR, no m/g/r. No peripheral edema.  MSK: Contractured right arm and hand 2/2 stroke.  Neurologic: Grossly nonfocal. A/O x 4.  Skin: No rashes, petechiae, or bruising noted on exposed skin.    Laboratory Data:  Most Recent 3 CBC's:  Recent Labs   Lab Test 06/05/23  0910 05/17/23  1048 05/16/23  0610 05/12/23  0731 05/11/23  0648   WBC 11.1* 13.5* 13.0*   < > 14.8*   HGB 7.5* 6.7* 6.1*   < > 6.4*   MCV 81 77* 84   < > 88   * 536* 343   < > 474*   ANEUTAUTO 7.8 9.6*  --   --  12.8*    < > = values in this interval not displayed.    Most Recent 3 BMP's:  Recent Labs   Lab Test 06/05/23  0910 05/17/23  1048 05/13/23  0605 05/12/23  0731 05/11/23  0648 05/10/23  1948    139 141 141 139 135*   POTASSIUM 3.7 4.0 4.2 4.3 4.3 3.7   CHLORIDE 106 110* 112* 111* 109* 104   CO2 22 19* 20* 20* 19* 19*   BUN 5.7* 6.5 10.3  8.7 6.3 5.5*   CR 0.53 0.51 0.62 0.67 0.53 0.58   ANIONGAP 8 10 9 10 11 12   MICAH 9.1 8.9 8.1* 8.3* 8.9 8.9   GLC 93 93 96 100* 137* 78   PROTTOTAL 7.6  --   --   --  7.2 7.4   ALBUMIN 4.5  --  3.8 4.0 4.2 4.4    Most Recent 2 LFT's:  Recent Labs   Lab Test 06/05/23  0910 05/14/23  0633 05/12/23  0731 05/11/23  0648   AST 29  --   --  27   ALT 18  --   --  12   ALKPHOS 67  --   --  71   BILITOTAL 2.2* 1.5*   < > 1.8*    < > = values in this interval not displayed.      Lab Results   Component Value Date    RETP 16.2 (H) 06/05/2023      Assessment and Plan:  1. Sickle Cell HgbSS Disease  2. Recent hospitalization with acute chest, pulmonary edema (1/2023)  3. Chronic Pain  4. 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 History of CVA  7. Hearing loss    Jennifer is doing well following most recent admission for hypoxia and asthma exacerbation. V/Q scan at that time was negative for PE. Historically PE evaluation has been completed with V/Q due to concern for contrast allergy. I am very pleased with her continued taper of oxycodone and reported use at home. She is going many days without taking medication when she is experiencing no pain. We did not adjust her prescription last month when she was acutely ill but will decreased further today, reducing her total pills dispensed to 22 from 25.     Labs today are stable with hemoglobin 7.6 and WBC slightly elevated at 11.1. She continues to adhere to twice weekly infusion visits and will receive IVF and pain medication today in infusion. Crizanlizumab infusions have been resume which she will continue on Fridays.     Desferal is currently on hold due to desire to stop/hold infusion due to decreased quality of life related to the amount of time she needed to be connected for infusion. Remains on Jadenu. A repeat Ferriscan was previously requested but has not yet been scheduled. I will request this again today.     We discussed  a trial of cetirizine for seasonal allergies rather than treating symptoms with Benadryl which she is agreeable to.    Plan:  -Continue Hydrea to 3000mg daily to help lessen frequency of sickle cell pain. Refilled today.  -Continue slow taper of oxycodone. Will decrease to 22 tablets per fill from 25. She can continue the frequency at every 4 hours with a max of 6 tablets per day but with the decreased tablets per refill should aim for a max of 4 tablets per day. She can self-reduce infusion days/week and we will continue to keep the cap at 2/week for now.  -Continue infusion center visits limited to two times per week (Mondays and Fridays ideally although still needs to call to request). Continue diligent home management with current medications, heat, rest, compression, warm baths.   -Begin cetirizine 10 mg daily for seasonal allergies.  -If unable to manage at home can go to ED but continue to not do IV narcotics in the emergency room. Ketamine previously added to pain plan in ED  - Desferal infusions on hold. Continue Jadenu and check Ferriscan. Will likely need to resume infusions in the future.   -Continue aspirin BID  -RTC with me  7/14 (coordinate with [facility]ney infusion) and 8/11. Follow-up with Dr. Duncan in 3 months.    40 minutes spent on the date of the encounter doing chart review, review of test results, interpretation of tests, patient visit and documentation     Patricia Mantilla CNP          Again, thank you for allowing me to participate in the care of your patient.        Sincerely,        Patricia Mantilla CNP

## 2023-06-05 NOTE — NURSING NOTE
Chief Complaint   Patient presents with     Oncology Clinic Visit     Sickle Cell Anemia     Port Draw     Labs drawn via port by RN in lab. VS taken.     Port accessed and labs drawn by RN. Pt tolerated well.  VS taken.  Pt checked in for next appt.    Melissa Wakefield RN

## 2023-06-05 NOTE — TELEPHONE ENCOUNTER
Oncology Nurse Triage - Sickle Cell Pain Crisis:  Situation: Jennifer  calling about Sickle Cell Pain Crisis, requesting to be added on for IV fluids and pain medicine    Background:   Patient's last infusion was 6/1/23   Last clinic visit date: 5/8/23   Does patient have active treatment plan?  Yes    Assessment of Symptoms:  Onset/Duration of symptoms: 2 day    Is it typical sickle cell pain? Yes   Location: all over  Character: Sharp           Intensity: 10/10    Any radiation of pain, numbness, tingling, weakness, warmth, swelling, discoloration of arms or legs?No     Fever?No    Chest Pain Present: No     Shortness of breath: No     Other home therapies tried: HEAT/HEATING PAD     Last home medication taken and when: 6am Oxycodone    Any Refills Needed?: Yes - sees Patricia today and will let her know    Does patient have transportation & length of time to get to clinic: Yes - if can get into infusion prior to leaving for currently scheduled appt at 0845 and 0915, does not need transportation set up. But if can get in after, will need transportation.    Recommendations:   Added to infusion wait list.   0735 per Allison, charge RN, BMT infusion has 10am appt.  0736 call to pt, who is accepting of 10am time.   0737 message sent to scheduling.     If you do not hear from the infusion center by 2pm then you will not be able to get in for an infusion today. If symptoms worsen while waiting for call back, and/or you experience fever, chills, SOB, chest pain, cough, n/v, dizziness, numbness, swelling, discoloration of extremities, then seek emergency evaluation in Emergency Department.     Please note, if you are late for your appt, you risk losing your infusion appt as it may delay another patient's infusion who arrived on time.

## 2023-06-05 NOTE — NURSING NOTE
"Oncology Rooming Note    June 5, 2023 9:15 AM   Jennifer Cervantes is a 23 year old female who presents for:    Chief Complaint   Patient presents with     Oncology Clinic Visit     Sickle Cell Anemia     Port Draw     Labs drawn via port by RN in lab. VS taken.     Initial Vitals: /71 (BP Location: Right arm, Patient Position: Sitting, Cuff Size: Adult Regular)   Pulse 85   Temp 98.7  F (37.1  C) (Oral)   Resp 18   Wt 67.9 kg (149 lb 11.2 oz)   SpO2 99%   BMI 25.70 kg/m   Estimated body mass index is 25.7 kg/m  as calculated from the following:    Height as of 5/10/23: 1.626 m (5' 4\").    Weight as of this encounter: 67.9 kg (149 lb 11.2 oz). Body surface area is 1.75 meters squared.  Worst Pain (10) Comment: Data Unavailable   No LMP recorded. Patient has had an implant.  Allergies reviewed: Yes  Medications reviewed: Yes    Medications: Refill Aspirin, oxycodone, benadryl, hydroxyurea, jadenu    Pharmacy name entered into EPIC: [facility] [place], [place] - [address] [id number]    Clinical concerns: NONE      Earnestine Narvaez            "

## 2023-06-05 NOTE — PROGRESS NOTES
Infusion Nursing Note:  Jennifer Cervantes presents today for add-on infusion.    Patient seen by provider today: Yes: Patricia Mantilla CNP   present during visit today: Not Applicable.    Note: Pt here today for add on IVF/pain meds. VSS. Pt assessed by provider prior to infusion. Pt reports 10/10 generalized pain for which 1L LR at 500 mL/hr and 1 mg Dilaudid IVP x3 doses (total dose 3 mg) were administered. At time of discharge pt reported pain to be tolerable and rated 3/10.      Intravenous Access:  Implanted Port.    Treatment Conditions:  Not Applicable.      Post Infusion Assessment:  Patient tolerated infusion without incident.  Blood return noted pre and post infusion.  Site patent and intact, free from redness, edema or discomfort.  No evidence of extravasations.  Access discontinued per protocol.       Discharge Plan:   Patient discharged in stable condition accompanied by: self.  Departure Mode: Ambulatory.      Jaida Kelly RN

## 2023-06-08 ENCOUNTER — NURSE TRIAGE (OUTPATIENT)
Dept: ONCOLOGY | Facility: CLINIC | Age: 23
End: 2023-06-08
Payer: COMMERCIAL

## 2023-06-08 NOTE — TELEPHONE ENCOUNTER
Oncology Nurse Triage - Sickle Cell Pain Crisis:    Situation: Jennifer  calling about Sickle Cell Pain Crisis, requesting to be added on for IV fluids and pain medicine    Background:   Patient's last infusion was 6/5/23  Last clinic visit date:6/5/23 with Patricia Mantilla CNP  Next clinic visit: 7/14/23 with Patricia Mantilla CNP  Does patient have active treatment plan?  Yes    Assessment of Symptoms:  Onset/Duration of symptoms: 2 days    Is it typical sickle cell pain? Yes   Location: generalized  Character: Sharp           Intensity: 10/10    Any radiation of pain, numbness, tingling, weakness, warmth, swelling, discoloration of arms or legs?No     Fever?No  (if yes max temperature recorded in last 24 hours):      Chest Pain Present: No     Shortness of breath: No     Other home therapies tried: WARM BATH, warm blanket, heating pad    Last home medication taken and when: 0600, oxycodone    Any Refills Needed?: No     Does patient have transportation & length of time to get to clinic: Yes 25 min away     Recommendations:   Added to infusion wait list    If you do not hear from the infusion center by 2pm then you will not be able to get in for an infusion today. If symptoms worsen while waiting for call back, and/or you experience fever, chills, SOB, chest pain, cough, n/v, dizziness, numbness, swelling, discoloration of extremities, then seek emergency evaluation in Emergency Department.       Unable to offer apt d/t capacity.

## 2023-06-09 ENCOUNTER — INFUSION THERAPY VISIT (OUTPATIENT)
Dept: INFUSION THERAPY | Facility: CLINIC | Age: 23
End: 2023-06-09
Attending: PEDIATRICS
Payer: COMMERCIAL

## 2023-06-09 ENCOUNTER — NURSE TRIAGE (OUTPATIENT)
Dept: ONCOLOGY | Facility: CLINIC | Age: 23
End: 2023-06-09
Payer: COMMERCIAL

## 2023-06-09 ENCOUNTER — PATIENT OUTREACH (OUTPATIENT)
Dept: CARE COORDINATION | Facility: CLINIC | Age: 23
End: 2023-06-09
Payer: COMMERCIAL

## 2023-06-09 VITALS
HEART RATE: 93 BPM | TEMPERATURE: 98 F | RESPIRATION RATE: 18 BRPM | SYSTOLIC BLOOD PRESSURE: 121 MMHG | OXYGEN SATURATION: 96 % | DIASTOLIC BLOOD PRESSURE: 73 MMHG

## 2023-06-09 DIAGNOSIS — G81.10 SPASTIC HEMIPLEGIA, UNSPECIFIED ETIOLOGY, UNSPECIFIED LATERALITY (H): Primary | ICD-10-CM

## 2023-06-09 DIAGNOSIS — D57.00 SICKLE CELL PAIN CRISIS (H): ICD-10-CM

## 2023-06-09 PROCEDURE — 96361 HYDRATE IV INFUSION ADD-ON: CPT

## 2023-06-09 PROCEDURE — 250N000011 HC RX IP 250 OP 636: Performed by: PEDIATRICS

## 2023-06-09 PROCEDURE — 96376 TX/PRO/DX INJ SAME DRUG ADON: CPT

## 2023-06-09 PROCEDURE — 96374 THER/PROPH/DIAG INJ IV PUSH: CPT

## 2023-06-09 PROCEDURE — 258N000003 HC RX IP 258 OP 636: Performed by: PEDIATRICS

## 2023-06-09 RX ORDER — DIPHENHYDRAMINE HCL 25 MG
25 CAPSULE ORAL
Status: CANCELLED
Start: 2023-07-01

## 2023-06-09 RX ORDER — HEPARIN SODIUM (PORCINE) LOCK FLUSH IV SOLN 100 UNIT/ML 100 UNIT/ML
5 SOLUTION INTRAVENOUS
Status: DISCONTINUED | OUTPATIENT
Start: 2023-06-09 | End: 2023-06-09 | Stop reason: HOSPADM

## 2023-06-09 RX ORDER — HEPARIN SODIUM,PORCINE 10 UNIT/ML
5 VIAL (ML) INTRAVENOUS
Status: CANCELLED | OUTPATIENT
Start: 2023-07-01

## 2023-06-09 RX ORDER — HEPARIN SODIUM (PORCINE) LOCK FLUSH IV SOLN 100 UNIT/ML 100 UNIT/ML
5 SOLUTION INTRAVENOUS
Status: CANCELLED | OUTPATIENT
Start: 2023-07-01

## 2023-06-09 RX ORDER — DIPHENHYDRAMINE HCL 25 MG
25 CAPSULE ORAL
Status: DISCONTINUED | OUTPATIENT
Start: 2023-06-09 | End: 2023-06-09 | Stop reason: HOSPADM

## 2023-06-09 RX ORDER — ONDANSETRON 4 MG/1
8 TABLET, FILM COATED ORAL
Status: CANCELLED
Start: 2023-07-01

## 2023-06-09 RX ADMIN — HYDROMORPHONE HYDROCHLORIDE 1 MG: 1 INJECTION, SOLUTION INTRAMUSCULAR; INTRAVENOUS; SUBCUTANEOUS at 15:52

## 2023-06-09 RX ADMIN — HYDROMORPHONE HYDROCHLORIDE 1 MG: 1 INJECTION, SOLUTION INTRAMUSCULAR; INTRAVENOUS; SUBCUTANEOUS at 13:59

## 2023-06-09 RX ADMIN — HYDROMORPHONE HYDROCHLORIDE 1 MG: 1 INJECTION, SOLUTION INTRAMUSCULAR; INTRAVENOUS; SUBCUTANEOUS at 14:58

## 2023-06-09 RX ADMIN — Medication 5 ML: at 15:57

## 2023-06-09 RX ADMIN — SODIUM CHLORIDE, POTASSIUM CHLORIDE, SODIUM LACTATE AND CALCIUM CHLORIDE 1000 ML: 600; 310; 30; 20 INJECTION, SOLUTION INTRAVENOUS at 13:52

## 2023-06-09 NOTE — PROGRESS NOTES
Social Work - Transportation  [facility]    Data/Intervention:  Patient Name: Jennifer Cervantes  /Age: 2000 (23 year old)    Referral From: Ange Triage RN  Reason for Referral:  support requested for patient transportatioN.  Assessment:  called [employer] to arrange ride through patient's insurance. [employer] arranged  for patient from home with [employer]. Patient will need to call [phone] when ready for return ride home.  Plan: Patient is aware of the transportation plan.  available to assist with any other needs.  CARLOS Chavez,[facility]  Hematology/Oncology Social Worker  Phone:[phone] Pager: [phone]

## 2023-06-09 NOTE — PROGRESS NOTES
Nursing Note  Jennifer Cervantes presents today to [facility] for:   Chief Complaint   Patient presents with     Infusion     IVF, pain meds       During today's [facility] appointment, orders from Dr. Duncan were completed.  Frequency: as needed    Progress note:  Patient identification verified by name and date of birth.  Assessment completed.  Vitals recorded in Doc Flowsheets.  Patient was provided with education regarding medication/procedure and possible side effects.  Patient verbalized understanding.     present during visit today: Not Applicable.    Treatment Conditions: Non-applicable.     Premedications: none needed    Drug Waste Record: No    Infusion length and rate:  infusion given over approximately 2 hours    Labs: were not ordered for this appointment.    Vascular access: port accessed today.    Is the next appt scheduled? prn    Post Infusion Assessment:  Patient tolerated infusion without incident.     Discharge Plan:   Follow up plan of care with: ordering provider as scheduled.  Discharge instructions were reviewed with patient.  Patient/representative verbalized understanding of discharge instructions and all questions answered.  Patient discharged from [facility] in stable condition.    Yina Ford RN    Administrations This Visit     HYDROmorphone (DILAUDID) injection 1 mg     Admin Date  06/09/2023 Action  $Given Dose  1 mg Route  Intravenous Administered By  Yina Ford RN           Admin Date  06/09/2023 Action  $Given Dose  1 mg Route  Intravenous Administered By  Yina Ford RN           Admin Date  06/09/2023 Action  $Given Dose  1 mg Route  Intravenous Administered By  Yina Ford RN          lactated ringers BOLUS 1,000 mL     Admin Date  06/09/2023 Action  $New Bag Dose  1,000 mL Rate  500 mL/hr Route  Intravenous Administered By  Yina Ford RN                /73    Pulse 93   Temp 98  F (36.7  C) (Oral)   Resp 18   LMP  (LMP Unknown)   SpO2 96%

## 2023-06-09 NOTE — TELEPHONE ENCOUNTER
Oncology Nurse Triage - Sickle Cell Pain Crisis:    Situation: Jennifer  calling about Sickle Cell Pain Crisis, requesting to be added on for IV fluids and pain medicine    Background:     Patient's last infusion was 6/5/23   Last clinic visit date:6/5/23 Patricia Mantilla CNP  Does patient have active treatment plan?  Yes    Assessment of Symptoms:  Onset/Duration of symptoms: 3 day    Is it typical sickle cell pain? Yes   Location: generalized  Character: Sharp           Intensity: 9/10    Any radiation of pain, numbness, tingling, weakness, warmth, swelling, discoloration of arms or legs?No     Fever?No  Chest Pain Present: No     Shortness of breath: No     Other home therapies tried: HEAT/HEATING PAD     Last home medication taken and when: 6am    Any Refills Needed?: No     Does patient have transportation & length of time to get to clinic: No     Recommendations:   Meets protocol    0840 Appt available with [facility] for 2pm IVF/Pain. Jennifer in agreement with appt, will wait for call from .     0843 Paged SW to assist with transportation    0844 Scheduling request sent.     Please note, if you are late for your appt, you risk losing your infusion appt as it may delay another patient's infusion who arrived on time.

## 2023-06-12 ENCOUNTER — NURSE TRIAGE (OUTPATIENT)
Dept: ONCOLOGY | Facility: CLINIC | Age: 23
End: 2023-06-12

## 2023-06-12 DIAGNOSIS — D57.00 SICKLE CELL PAIN CRISIS (H): ICD-10-CM

## 2023-06-12 RX ORDER — OXYCODONE HYDROCHLORIDE 15 MG/1
15 TABLET ORAL EVERY 4 HOURS PRN
Qty: 22 TABLET | Refills: 0 | Status: SHIPPED | OUTPATIENT
Start: 2023-06-12 | End: 2023-06-19

## 2023-06-12 NOTE — TELEPHONE ENCOUNTER
Narcotic Refill Request  Medication(s) requested:  Oxycodone 15mg tab  Person Requesting Refill: Jennifer  What pain is the medication treating: sickle cell pain  How is the medication being taken?:1 tab q4h PRN  Does pt have enough for today? Will be out this afternoon  Is pain being adequately controlled on the current regimen?: yes  Experiencing any side effects from medication?: no    Date of most recent appointment:  6/5/23 edwina Lizarraga No Show Visits: no  Next appointment:   7/14/23  Last fill date and by whom:  6/5/23 by Patricia Mantilla    Reviewed: no    Routed provider: Patricia Mantilla

## 2023-06-12 NOTE — TELEPHONE ENCOUNTER
Oncology Nurse Triage - Sickle Cell Pain Crisis:  Situation: Jennifer  calling about Sickle Cell Pain Crisis, requesting to be added on for IV fluids and pain medicine    Background:   Patient's last infusion was 6/9/23  Last clinic visit date:6/5/23 w/ Patricia Mantilla  Does patient have active treatment plan?  Yes    Assessment of Symptoms:  Onset/Duration of symptoms: 2 day    Is it typical sickle cell pain? Yes   Location: generalized  Character: Sharp           Intensity: 9/10    Any radiation of pain, numbness, tingling, weakness, warmth, swelling, discoloration of arms or legs?No     Fever?No     Chest Pain Present: No     Shortness of breath: No     Other home therapies tried: HEAT/HEATING PAD     Last home medication taken and when: 0600 Oxycodone     Any Refills Needed?: Yes - oxycodone (see refill encounter)     Does patient have transportation & length of time to get to clinic: no- will need transportation    Recommendations:   Added to infusion wait list  0707 message to Patricia Mantilla for provider approval to come into infusion d/t last infusion was 3 days ago. 0714 Patricia said okay to come in.     If you do not hear from the infusion center by 2pm then you will not be able to get in for an infusion today. If symptoms worsen while waiting for call back, and/or you experience fever, chills, SOB, chest pain, cough, n/v, dizziness, numbness, swelling, discoloration of extremities, then seek emergency evaluation in Emergency Department.     Please note, if you are late for your appt, you risk losing your infusion appt as it may delay another patient's infusion who arrived on time.           
General

## 2023-06-13 ENCOUNTER — NURSE TRIAGE (OUTPATIENT)
Dept: ONCOLOGY | Facility: CLINIC | Age: 23
End: 2023-06-13
Payer: COMMERCIAL

## 2023-06-13 NOTE — TELEPHONE ENCOUNTER
Oncology Nurse Triage - Sickle Cell Pain Crisis:  Situation: Jennifer  calling about Sickle Cell Pain Crisis, requesting to be added on for IV fluids and pain medicine     Background:   Patient's last infusion was 6/9/23  Last clinic visit date:6/5/23 w/ Patricia Mantilla  Does patient have active treatment plan?  Yes     Assessment of Symptoms:  Onset/Duration of symptoms: 3 days     Is it typical sickle cell pain? Yes   Location: generalized  Character: Sharp           Intensity: 10/10     Any radiation of pain, numbness, tingling, weakness, warmth, swelling, discoloration of arms or legs? No      Fever?No      Chest Pain Present: No      Shortness of breath: No      Other home therapies tried: HEAT/HEATING PAD      Last home medication taken and when: 0600 Oxycodone      Any Refills Needed?: no     Does patient have transportation & length of time to get to clinic: no- will need transportation     Recommendations:   Added to infusion wait list

## 2023-06-14 ENCOUNTER — PATIENT OUTREACH (OUTPATIENT)
Dept: CARE COORDINATION | Facility: CLINIC | Age: 23
End: 2023-06-14
Payer: COMMERCIAL

## 2023-06-14 ENCOUNTER — INFUSION THERAPY VISIT (OUTPATIENT)
Dept: INFUSION THERAPY | Facility: CLINIC | Age: 23
End: 2023-06-14
Attending: REGISTERED NURSE
Payer: COMMERCIAL

## 2023-06-14 ENCOUNTER — NURSE TRIAGE (OUTPATIENT)
Dept: ONCOLOGY | Facility: CLINIC | Age: 23
End: 2023-06-14
Payer: COMMERCIAL

## 2023-06-14 VITALS
TEMPERATURE: 97.9 F | HEART RATE: 89 BPM | RESPIRATION RATE: 18 BRPM | OXYGEN SATURATION: 98 % | DIASTOLIC BLOOD PRESSURE: 76 MMHG | SYSTOLIC BLOOD PRESSURE: 123 MMHG

## 2023-06-14 DIAGNOSIS — G81.10 SPASTIC HEMIPLEGIA, UNSPECIFIED ETIOLOGY, UNSPECIFIED LATERALITY (H): Primary | ICD-10-CM

## 2023-06-14 DIAGNOSIS — D57.00 SICKLE CELL PAIN CRISIS (H): ICD-10-CM

## 2023-06-14 PROCEDURE — 250N000011 HC RX IP 250 OP 636: Performed by: PEDIATRICS

## 2023-06-14 PROCEDURE — 96374 THER/PROPH/DIAG INJ IV PUSH: CPT

## 2023-06-14 PROCEDURE — 96361 HYDRATE IV INFUSION ADD-ON: CPT

## 2023-06-14 PROCEDURE — 96376 TX/PRO/DX INJ SAME DRUG ADON: CPT

## 2023-06-14 PROCEDURE — 258N000003 HC RX IP 258 OP 636: Performed by: PEDIATRICS

## 2023-06-14 RX ORDER — HEPARIN SODIUM (PORCINE) LOCK FLUSH IV SOLN 100 UNIT/ML 100 UNIT/ML
5 SOLUTION INTRAVENOUS
Status: DISCONTINUED | OUTPATIENT
Start: 2023-06-14 | End: 2023-06-14 | Stop reason: HOSPADM

## 2023-06-14 RX ORDER — HEPARIN SODIUM (PORCINE) LOCK FLUSH IV SOLN 100 UNIT/ML 100 UNIT/ML
5 SOLUTION INTRAVENOUS
Status: CANCELLED | OUTPATIENT
Start: 2023-07-01

## 2023-06-14 RX ORDER — HEPARIN SODIUM,PORCINE 10 UNIT/ML
5 VIAL (ML) INTRAVENOUS
Status: CANCELLED | OUTPATIENT
Start: 2023-07-01

## 2023-06-14 RX ORDER — DIPHENHYDRAMINE HCL 25 MG
25 CAPSULE ORAL
Status: CANCELLED
Start: 2023-07-01

## 2023-06-14 RX ORDER — ONDANSETRON 4 MG/1
8 TABLET, FILM COATED ORAL
Status: CANCELLED
Start: 2023-07-01

## 2023-06-14 RX ADMIN — Medication 5 ML: at 12:24

## 2023-06-14 RX ADMIN — HYDROMORPHONE HYDROCHLORIDE 1 MG: 1 INJECTION, SOLUTION INTRAMUSCULAR; INTRAVENOUS; SUBCUTANEOUS at 10:07

## 2023-06-14 RX ADMIN — SODIUM CHLORIDE, POTASSIUM CHLORIDE, SODIUM LACTATE AND CALCIUM CHLORIDE 1000 ML: 600; 310; 30; 20 INJECTION, SOLUTION INTRAVENOUS at 10:05

## 2023-06-14 RX ADMIN — HYDROMORPHONE HYDROCHLORIDE 1 MG: 1 INJECTION, SOLUTION INTRAMUSCULAR; INTRAVENOUS; SUBCUTANEOUS at 12:07

## 2023-06-14 RX ADMIN — HYDROMORPHONE HYDROCHLORIDE 1 MG: 1 INJECTION, SOLUTION INTRAMUSCULAR; INTRAVENOUS; SUBCUTANEOUS at 11:11

## 2023-06-14 ASSESSMENT — PAIN SCALES - GENERAL: PAINLEVEL: WORST PAIN (10)

## 2023-06-14 NOTE — PATIENT INSTRUCTIONS
Dear Jennifer Cervantes    Thank you for choosing [facility] ([facility]) for your infusion.  The following information is a summary of our appointment as well as important reminders.      We look forward in seeing you on your next appointment here at [facility] ([facility]).  Please don t hesitate to call us at [phone] to reschedule any of your appointments or to speak with one of the [facility] registered nurses.  It was a pleasure taking care of you today.    Sincerely,    [facility]  [facility]  [address] [place] [address]  [address]  Phone:  [phone]

## 2023-06-14 NOTE — TELEPHONE ENCOUNTER
Oncology Nurse Triage - Sickle Cell Pain Crisis:  Situation: Jennifer  calling about Sickle Cell Pain Crisis, requesting to be added on for IV fluids and pain medicine     Background:   Patient's last infusion was 6/9/23  Last clinic visit date:6/5/23 w/ Patricia Mantilla  Does patient have active treatment plan?  Yes     Assessment of Symptoms:  Onset/Duration of symptoms: 4 days     Is it typical sickle cell pain? Yes   Location: generalized  Character: Sharp           Intensity: 10/10     Any radiation of pain, numbness, tingling, weakness, warmth, swelling, discoloration of arms or legs? No      Fever? No      Chest Pain Present: No      Shortness of breath: No      Other home therapies tried: HEAT/HEATING PAD      Last home medication taken and when: 0600 Oxycodone      Any Refills Needed?: no     Does patient have transportation & length of time to get to clinic: no- will need transportation     Recommendations:   Added to infusion wait list.  Per Roz, charge RN, [facility] can take pt at 10am. Pt accepting of time. 0238 message sent to  to arrange transport. 3171 message sent to scheduling.

## 2023-06-14 NOTE — PROGRESS NOTES
Infusion Nursing Note:  Jennifer Cervantes presents today for IVF/pain meds.    Patient seen by provider today: No   present during visit today: Not Applicable.    Note:   1L LR infused over 2 hours  Dilaudid given x3 doses.    Intravenous Access:  Implanted Port.    Treatment Conditions:  Not Applicable.    Administrations This Visit     heparin 100 unit/mL injection 5 mL     Admin Date  06/14/2023 Action  $Given Dose  5 mL Route  Intracatheter Administered By  Eulalia Rashid RN          HYDROmorphone (DILAUDID) injection 1 mg     Admin Date  06/14/2023 Action  $Given Dose  1 mg Route  Intravenous Administered By  Eulalia Rashid RN           Admin Date  06/14/2023 Action  $Given Dose  1 mg Route  Intravenous Administered By  Eulalia Rashid RN           Admin Date  06/14/2023 Action  $Given Dose  1 mg Route  Intravenous Administered By  Eulalia Rashid RN          lactated ringers BOLUS 1,000 mL     Admin Date  06/14/2023 Action  $New Bag Dose  1,000 mL Rate  500 mL/hr Route  Intravenous Administered By  Eulalia Rashid RN              /82 (BP Location: Left arm, Patient Position: Sitting, Cuff Size: Adult Regular)   Pulse 89   Temp 97.9  F (36.6  C) (Oral)   Resp 18   LMP  (LMP Unknown)   SpO2 98%     Post Infusion Assessment:  Patient tolerated infusion without incident.  Blood return noted pre and post infusion.  Site patent and intact, free from redness, edema or discomfort.  No evidence of extravasations.  Access discontinued per protocol.       Discharge Plan:   Discharge instructions reviewed with: Patient.  Patient and/or family verbalized understanding of discharge instructions and all questions answered.  AVS to patient via MYCH[employer].    Patient discharged in stable condition accompanied by: self.  Departure Mode: Ambulatory.      Eulalia Rashid RN

## 2023-06-14 NOTE — PROGRESS NOTES
Community Health Worker Note: Telephone Call  Oncology Clinic     Data/Intervention:  Patient Name:  Jennifer Cervantes DOB/Age: [birth year]     Call From: Triage Nurse        Reason for Call:  Transportation      Assessment:     CHW also called [employer]  ([phone] )  to arrange ride through patient's insurance.  [employer] arranged a round trip ride  Patient will need to call when ready for return ride home [phone]. Talked to patient and she will call [employer] to setup  time.     Plan:  Patient is aware of the transportation plan. /CHW available to assist with any other needs.      Isaura OTTO Community Health Worker  Clinic Care Coordination  [facility]  Phone: [phone]

## 2023-06-15 ENCOUNTER — THERAPY VISIT (OUTPATIENT)
Dept: OCCUPATIONAL THERAPY | Facility: CLINIC | Age: 23
End: 2023-06-15
Payer: COMMERCIAL

## 2023-06-15 DIAGNOSIS — I69.351 HEMIPLEGIA AND HEMIPARESIS FOLLOWING CEREBRAL INFARCTION AFFECTING RIGHT DOMINANT SIDE (H): Primary | ICD-10-CM

## 2023-06-15 DIAGNOSIS — R25.2 SPASTICITY: ICD-10-CM

## 2023-06-15 PROCEDURE — 97140 MANUAL THERAPY 1/> REGIONS: CPT | Mod: GO

## 2023-06-15 PROCEDURE — 97763 ORTHC/PROSTC MGMT SBSQ ENC: CPT | Mod: GO

## 2023-06-15 RX ORDER — HEPARIN SODIUM (PORCINE) LOCK FLUSH IV SOLN 100 UNIT/ML 100 UNIT/ML
5 SOLUTION INTRAVENOUS
Status: CANCELLED | OUTPATIENT
Start: 2023-07-12

## 2023-06-15 RX ORDER — MEPERIDINE HYDROCHLORIDE 25 MG/ML
25 INJECTION INTRAMUSCULAR; INTRAVENOUS; SUBCUTANEOUS EVERY 30 MIN PRN
Status: CANCELLED | OUTPATIENT
Start: 2023-07-12

## 2023-06-15 RX ORDER — HEPARIN SODIUM,PORCINE 10 UNIT/ML
5 VIAL (ML) INTRAVENOUS
Status: CANCELLED | OUTPATIENT
Start: 2023-07-12

## 2023-06-15 RX ORDER — EPINEPHRINE 1 MG/ML
0.3 INJECTION, SOLUTION INTRAMUSCULAR; SUBCUTANEOUS EVERY 5 MIN PRN
Status: CANCELLED | OUTPATIENT
Start: 2023-07-12

## 2023-06-15 RX ORDER — ALBUTEROL SULFATE 90 UG/1
1-2 AEROSOL, METERED RESPIRATORY (INHALATION)
Status: CANCELLED
Start: 2023-07-12

## 2023-06-15 RX ORDER — ALBUTEROL SULFATE 0.83 MG/ML
2.5 SOLUTION RESPIRATORY (INHALATION)
Status: CANCELLED | OUTPATIENT
Start: 2023-07-12

## 2023-06-15 RX ORDER — METHYLPREDNISOLONE SODIUM SUCCINATE 125 MG/2ML
125 INJECTION, POWDER, LYOPHILIZED, FOR SOLUTION INTRAMUSCULAR; INTRAVENOUS
Status: CANCELLED
Start: 2023-07-12

## 2023-06-15 RX ORDER — DIPHENHYDRAMINE HYDROCHLORIDE 50 MG/ML
50 INJECTION INTRAMUSCULAR; INTRAVENOUS
Status: CANCELLED
Start: 2023-07-12

## 2023-06-16 ENCOUNTER — NURSE TRIAGE (OUTPATIENT)
Dept: ONCOLOGY | Facility: CLINIC | Age: 23
End: 2023-06-16

## 2023-06-16 ENCOUNTER — APPOINTMENT (OUTPATIENT)
Dept: LAB | Facility: CLINIC | Age: 23
End: 2023-06-16
Payer: COMMERCIAL

## 2023-06-16 ENCOUNTER — INFUSION THERAPY VISIT (OUTPATIENT)
Dept: ONCOLOGY | Facility: CLINIC | Age: 23
End: 2023-06-16
Payer: COMMERCIAL

## 2023-06-16 VITALS
TEMPERATURE: 98.6 F | WEIGHT: 147.8 LBS | HEART RATE: 104 BPM | SYSTOLIC BLOOD PRESSURE: 139 MMHG | BODY MASS INDEX: 25.37 KG/M2 | OXYGEN SATURATION: 97 % | DIASTOLIC BLOOD PRESSURE: 87 MMHG | RESPIRATION RATE: 16 BRPM

## 2023-06-16 DIAGNOSIS — D57.00 SICKLE CELL PAIN CRISIS (H): Primary | ICD-10-CM

## 2023-06-16 DIAGNOSIS — G81.10 SPASTIC HEMIPLEGIA, UNSPECIFIED ETIOLOGY, UNSPECIFIED LATERALITY (H): ICD-10-CM

## 2023-06-16 LAB
ANION GAP SERPL CALCULATED.3IONS-SCNC: 11 MMOL/L (ref 7–15)
BASOPHILS # BLD AUTO: 0.2 10E3/UL (ref 0–0.2)
BASOPHILS NFR BLD AUTO: 2 %
BUN SERPL-MCNC: 8.7 MG/DL (ref 6–20)
CALCIUM SERPL-MCNC: 9.4 MG/DL (ref 8.6–10)
CHLORIDE SERPL-SCNC: 108 MMOL/L (ref 98–107)
CREAT SERPL-MCNC: 0.59 MG/DL (ref 0.51–0.95)
DEPRECATED HCO3 PLAS-SCNC: 21 MMOL/L (ref 22–29)
EOSINOPHIL # BLD AUTO: 0.5 10E3/UL (ref 0–0.7)
EOSINOPHIL NFR BLD AUTO: 4 %
ERYTHROCYTE [DISTWIDTH] IN BLOOD BY AUTOMATED COUNT: 25 % (ref 10–15)
GFR SERPL CREATININE-BSD FRML MDRD: >90 ML/MIN/1.73M2
GLUCOSE SERPL-MCNC: 88 MG/DL (ref 70–99)
HCT VFR BLD AUTO: 23.1 % (ref 35–47)
HGB BLD-MCNC: 7.9 G/DL (ref 11.7–15.7)
IMM GRANULOCYTES # BLD: 0.1 10E3/UL
IMM GRANULOCYTES NFR BLD: 1 %
LYMPHOCYTES # BLD AUTO: 1.5 10E3/UL (ref 0.8–5.3)
LYMPHOCYTES NFR BLD AUTO: 11 %
MCH RBC QN AUTO: 28.1 PG (ref 26.5–33)
MCHC RBC AUTO-ENTMCNC: 34.2 G/DL (ref 31.5–36.5)
MCV RBC AUTO: 82 FL (ref 78–100)
MONOCYTES # BLD AUTO: 0.7 10E3/UL (ref 0–1.3)
MONOCYTES NFR BLD AUTO: 5 %
NEUTROPHILS # BLD AUTO: 10.1 10E3/UL (ref 1.6–8.3)
NEUTROPHILS NFR BLD AUTO: 77 %
NRBC # BLD AUTO: 0.2 10E3/UL
NRBC BLD AUTO-RTO: 2 /100
PLATELET # BLD AUTO: 521 10E3/UL (ref 150–450)
POTASSIUM SERPL-SCNC: 4 MMOL/L (ref 3.4–5.3)
RBC # BLD AUTO: 2.81 10E6/UL (ref 3.8–5.2)
RETICS # AUTO: 0.5 10E6/UL (ref 0.03–0.1)
RETICS/RBC NFR AUTO: 17.3 % (ref 0.5–2)
SODIUM SERPL-SCNC: 140 MMOL/L (ref 136–145)
WBC # BLD AUTO: 13 10E3/UL (ref 4–11)

## 2023-06-16 PROCEDURE — 258N000003 HC RX IP 258 OP 636: Performed by: PEDIATRICS

## 2023-06-16 PROCEDURE — 96365 THER/PROPH/DIAG IV INF INIT: CPT

## 2023-06-16 PROCEDURE — 96375 TX/PRO/DX INJ NEW DRUG ADDON: CPT

## 2023-06-16 PROCEDURE — 96376 TX/PRO/DX INJ SAME DRUG ADON: CPT

## 2023-06-16 PROCEDURE — 85025 COMPLETE CBC W/AUTO DIFF WBC: CPT | Performed by: PEDIATRICS

## 2023-06-16 PROCEDURE — 85045 AUTOMATED RETICULOCYTE COUNT: CPT | Performed by: PEDIATRICS

## 2023-06-16 PROCEDURE — 250N000011 HC RX IP 250 OP 636: Performed by: PEDIATRICS

## 2023-06-16 PROCEDURE — 96361 HYDRATE IV INFUSION ADD-ON: CPT

## 2023-06-16 PROCEDURE — 82310 ASSAY OF CALCIUM: CPT | Performed by: PEDIATRICS

## 2023-06-16 RX ORDER — DIPHENHYDRAMINE HCL 25 MG
25 CAPSULE ORAL
Status: CANCELLED
Start: 2023-07-01

## 2023-06-16 RX ORDER — HEPARIN SODIUM (PORCINE) LOCK FLUSH IV SOLN 100 UNIT/ML 100 UNIT/ML
5 SOLUTION INTRAVENOUS ONCE
Status: COMPLETED | OUTPATIENT
Start: 2023-06-16 | End: 2023-06-16

## 2023-06-16 RX ORDER — HEPARIN SODIUM (PORCINE) LOCK FLUSH IV SOLN 100 UNIT/ML 100 UNIT/ML
5 SOLUTION INTRAVENOUS
Status: CANCELLED | OUTPATIENT
Start: 2023-07-01

## 2023-06-16 RX ORDER — HEPARIN SODIUM (PORCINE) LOCK FLUSH IV SOLN 100 UNIT/ML 100 UNIT/ML
5 SOLUTION INTRAVENOUS
Status: DISCONTINUED | OUTPATIENT
Start: 2023-06-16 | End: 2023-06-16 | Stop reason: HOSPADM

## 2023-06-16 RX ORDER — ONDANSETRON 4 MG/1
8 TABLET, FILM COATED ORAL
Status: CANCELLED
Start: 2023-07-01

## 2023-06-16 RX ORDER — HEPARIN SODIUM,PORCINE 10 UNIT/ML
5 VIAL (ML) INTRAVENOUS
Status: CANCELLED | OUTPATIENT
Start: 2023-07-01

## 2023-06-16 RX ADMIN — SODIUM CHLORIDE 335 MG: 9 INJECTION, SOLUTION INTRAVENOUS at 11:07

## 2023-06-16 RX ADMIN — HYDROMORPHONE HYDROCHLORIDE 1 MG: 1 INJECTION, SOLUTION INTRAMUSCULAR; INTRAVENOUS; SUBCUTANEOUS at 11:13

## 2023-06-16 RX ADMIN — HYDROMORPHONE HYDROCHLORIDE 1 MG: 1 INJECTION, SOLUTION INTRAMUSCULAR; INTRAVENOUS; SUBCUTANEOUS at 13:27

## 2023-06-16 RX ADMIN — SODIUM CHLORIDE, POTASSIUM CHLORIDE, SODIUM LACTATE AND CALCIUM CHLORIDE 1000 ML: 600; 310; 30; 20 INJECTION, SOLUTION INTRAVENOUS at 12:24

## 2023-06-16 RX ADMIN — HYDROMORPHONE HYDROCHLORIDE 1 MG: 1 INJECTION, SOLUTION INTRAMUSCULAR; INTRAVENOUS; SUBCUTANEOUS at 12:15

## 2023-06-16 RX ADMIN — Medication 5 ML: at 14:34

## 2023-06-16 RX ADMIN — Medication 5 ML: at 10:18

## 2023-06-16 RX ADMIN — SODIUM CHLORIDE 250 ML: 9 INJECTION, SOLUTION INTRAVENOUS at 11:07

## 2023-06-16 ASSESSMENT — PAIN SCALES - GENERAL: PAINLEVEL: EXTREME PAIN (9)

## 2023-06-16 NOTE — PROGRESS NOTES
Infusion Nursing Note:  Jennifer Cervantes presents today for Crizanlizumab + Add-on pain meds/IV Fluids.    Patient seen by provider today: No   present during visit today: Not Applicable.    Note: Patient reports 9/10 all over back pain which was not relived by Oxy or heating pad this morning. She last took her Oxy at 6am. She states she was out in the sun yesterday and got too dehydrated which triggered sickle cell crisis. She is requesting to receive fluids/pain meds today along with her Jr.    Per secure chat Patricia Mantilla NP/Allison Orozco RN @[facility] 6/16/23:  - Okay to give fluids and pain meds today      Intravenous Access:  Implanted Port.    Treatment Conditions:    Lab Results   Component Value Date    HGB 7.9 (L) 06/16/2023    WBC 13.0 (H) 06/16/2023    ANEU 6.7 05/12/2023    ANEUTAUTO 10.1 (H) 06/16/2023     (H) 06/16/2023      Lab Results   Component Value Date     06/16/2023    POTASSIUM 4.0 06/16/2023    MAG 2.0 11/11/2021    CR 0.59 06/16/2023    MICAH 9.4 06/16/2023    BILITOTAL 2.2 (H) 06/05/2023    ALBUMIN 4.5 06/05/2023    ALT 18 06/05/2023    AST 29 06/05/2023     Results reviewed. Notified Patricia Mantilla NP of elevated bilirubin.    Biological Infusion Checklist:  ~~~ NOTE: If the patient answers yes to any of the questions below, hold the infusion and contact ordering provider or on-call provider.    1. Have you recently had an elevated temperature, fever, chills, productive cough, coughing for 3 weeks or longer or hemoptysis,  abnormal vital signs, night sweats,  chest pain or have you noticed a decrease in your appetite, unexplained weight loss or fatigue? No  2. Do you have any open wounds or new incisions? No  3. Do you have any upcoming hospitalizations or surgeries? Does not include esophagogastroduodenoscopy, colonoscopy, endoscopic retrograde cholangiopancreatography (ERCP), endoscopic ultrasound (EUS), dental procedures or joint aspiration/steroid injections  No  4. Do you currently have any signs of illness or infection or are you on any antibiotics? No  5. Have you had any new, sudden or worsening abdominal pain? No  6. Have you or anyone in your household received a live vaccination in the past 4 weeks? Please note: No live vaccines while on biologic/chemotherapy until 6 months after the last treatment. Patient can receive the flu vaccine (shot only), pneumovax and the Covid vaccine. It is optimal for the patient to get these vaccines mid cycle, but they can be given at any time as long as it is not on the day of the infusion. No  7. Have you recently been diagnosed with any new nervous system diseases (ie. Multiple sclerosis, Guillain Solsberry, seizures, neurological changes) or cancer diagnosis? Are you on any form of radiation or chemotherapy? No  8. Are you pregnant or breast feeding or do you have plans of pregnancy in the future? No  9. Have you been having any signs of worsening depression or suicidal ideations?  (benlysta only) No, N/A  10. Have there been any other new onset medical symptoms? No  11. Have you had any new blood clots? (IVIG only) No, N/A    Post Infusion Assessment:  Patient tolerated infusion without incident.  Blood return noted pre and post infusion.  Site patent and intact, free from redness, edema or discomfort.  No evidence of extravasations.  Access discontinued per protocol.     After three doses of Dilaudid, patient reported pain at 3/10 prior to discharge. Pain goal 5/10.      Discharge Plan:   Patient declined prescription refills.  Discharge instructions reviewed with: Patient.  Patient and/or family verbalized understanding of discharge instructions and all questions answered.  AVS to patient via [employer].  Patient will return 7/14 for next appointment.   Patient discharged in stable condition accompanied by: self.  Departure Mode: Ambulatory.      Allison Orozco RN

## 2023-06-16 NOTE — TELEPHONE ENCOUNTER
Oncology Nurse Triage - Sickle Cell Pain Crisis:    Situation: Jennifer  calling about Sickle Cell Pain Crisis    Background:     Patient's last infusion was 06/14/23  Last clinic visit date:06/05/23 wMikael Mantilla  Next follow-up appt on 07/14/23 w/Patricia Mantilla  Does patient have active treatment plan?  Yes    (If pt has been seen in ED or infusion in last 3 days or no showed last clinic visit then does not meet protocol unless specified in pt therapy plan)    Assessment of Symptoms:  Onset/Duration of symptoms: 1 day    Is it typical sickle cell pain? Yes   Location: back  Character: Sharp           Intensity: 8/10    Any radiation of pain, numbness, tingling, weakness, warmth, swelling, discoloration of extremities?No     Fever?No  (if yes max temperature recorded in last 24 hours):      Chest Pain Present: No     Shortness of breath: No     Other home therapies tried: HEAT/HEATING PAD and WARM BATH     Last home medication taken and when: Oxycodone 0600    Any Refills Needed?: No     Does patient have transportation & length of time to get to clinic: Yes       Grades for reference:       Grade 1 -   o Patient has active treatment plan.   o Patients last scheduled clinic appointment was attended  o Patient has not been seen in infusion or ED in the last 3 days (clarify exclusions in therapy plans)   o Afebrile   o Typical sickle cell pain   o Home medications have been tried   o No other symptoms     o     Recommendations:   0746 Messaging sent to infusion charge to approve adding on IVF/pain meds to Jr infusion today.    If you do not hear from the infusion center by 2pm then you will not be able to get in for an infusion today. If symptoms worsen while waiting for call back, and/or you experience fever, chills, SOB, chest pain, cough, n/v, dizziness, numbness, swelling, discoloration of extremities, then seek emergency evaluation in Emergency Department.     Please note, if you are late for your appt, you risk  losing your infusion appt as it may delay another patient's infusion who arrived on time.

## 2023-06-19 ENCOUNTER — NURSE TRIAGE (OUTPATIENT)
Dept: ONCOLOGY | Facility: CLINIC | Age: 23
End: 2023-06-19
Payer: COMMERCIAL

## 2023-06-19 ENCOUNTER — INFUSION THERAPY VISIT (OUTPATIENT)
Dept: TRANSPLANT | Facility: CLINIC | Age: 23
End: 2023-06-19
Attending: PEDIATRICS
Payer: COMMERCIAL

## 2023-06-19 ENCOUNTER — PATIENT OUTREACH (OUTPATIENT)
Dept: CARE COORDINATION | Facility: CLINIC | Age: 23
End: 2023-06-19
Payer: COMMERCIAL

## 2023-06-19 VITALS
OXYGEN SATURATION: 94 % | DIASTOLIC BLOOD PRESSURE: 71 MMHG | RESPIRATION RATE: 18 BRPM | TEMPERATURE: 98.5 F | HEART RATE: 100 BPM | SYSTOLIC BLOOD PRESSURE: 126 MMHG

## 2023-06-19 DIAGNOSIS — D57.00 SICKLE CELL PAIN CRISIS (H): ICD-10-CM

## 2023-06-19 DIAGNOSIS — G81.10 SPASTIC HEMIPLEGIA, UNSPECIFIED ETIOLOGY, UNSPECIFIED LATERALITY (H): Primary | ICD-10-CM

## 2023-06-19 PROCEDURE — 250N000011 HC RX IP 250 OP 636: Performed by: PEDIATRICS

## 2023-06-19 PROCEDURE — 96374 THER/PROPH/DIAG INJ IV PUSH: CPT

## 2023-06-19 PROCEDURE — 258N000003 HC RX IP 258 OP 636: Performed by: PEDIATRICS

## 2023-06-19 PROCEDURE — 96361 HYDRATE IV INFUSION ADD-ON: CPT

## 2023-06-19 PROCEDURE — 96376 TX/PRO/DX INJ SAME DRUG ADON: CPT

## 2023-06-19 PROCEDURE — 250N000013 HC RX MED GY IP 250 OP 250 PS 637: Performed by: PEDIATRICS

## 2023-06-19 RX ORDER — HEPARIN SODIUM (PORCINE) LOCK FLUSH IV SOLN 100 UNIT/ML 100 UNIT/ML
5 SOLUTION INTRAVENOUS
Status: DISCONTINUED | OUTPATIENT
Start: 2023-06-19 | End: 2023-06-19 | Stop reason: HOSPADM

## 2023-06-19 RX ORDER — ONDANSETRON 4 MG/1
8 TABLET, FILM COATED ORAL
Status: CANCELLED
Start: 2023-07-01

## 2023-06-19 RX ORDER — HEPARIN SODIUM,PORCINE 10 UNIT/ML
5 VIAL (ML) INTRAVENOUS
Status: CANCELLED | OUTPATIENT
Start: 2023-07-01

## 2023-06-19 RX ORDER — HEPARIN SODIUM (PORCINE) LOCK FLUSH IV SOLN 100 UNIT/ML 100 UNIT/ML
5 SOLUTION INTRAVENOUS
Status: CANCELLED | OUTPATIENT
Start: 2023-07-01

## 2023-06-19 RX ORDER — OXYCODONE HYDROCHLORIDE 15 MG/1
15 TABLET ORAL EVERY 4 HOURS PRN
Qty: 22 TABLET | Refills: 0 | Status: SHIPPED | OUTPATIENT
Start: 2023-06-19 | End: 2023-06-26

## 2023-06-19 RX ORDER — DIPHENHYDRAMINE HCL 25 MG
25 CAPSULE ORAL
Status: CANCELLED
Start: 2023-07-01

## 2023-06-19 RX ORDER — DIPHENHYDRAMINE HCL 25 MG
25 CAPSULE ORAL
Status: COMPLETED | OUTPATIENT
Start: 2023-06-19 | End: 2023-06-19

## 2023-06-19 RX ADMIN — HYDROMORPHONE HYDROCHLORIDE 1 MG: 1 INJECTION, SOLUTION INTRAMUSCULAR; INTRAVENOUS; SUBCUTANEOUS at 10:55

## 2023-06-19 RX ADMIN — HYDROMORPHONE HYDROCHLORIDE 1 MG: 1 INJECTION, SOLUTION INTRAMUSCULAR; INTRAVENOUS; SUBCUTANEOUS at 13:01

## 2023-06-19 RX ADMIN — DIPHENHYDRAMINE HYDROCHLORIDE 25 MG: 25 CAPSULE ORAL at 13:41

## 2023-06-19 RX ADMIN — Medication 5 ML: at 13:41

## 2023-06-19 RX ADMIN — HYDROMORPHONE HYDROCHLORIDE 1 MG: 1 INJECTION, SOLUTION INTRAMUSCULAR; INTRAVENOUS; SUBCUTANEOUS at 11:57

## 2023-06-19 RX ADMIN — SODIUM CHLORIDE, POTASSIUM CHLORIDE, SODIUM LACTATE AND CALCIUM CHLORIDE 1000 ML: 600; 310; 30; 20 INJECTION, SOLUTION INTRAVENOUS at 10:54

## 2023-06-19 ASSESSMENT — PAIN SCALES - GENERAL: PAINLEVEL: EXTREME PAIN (9)

## 2023-06-19 NOTE — TELEPHONE ENCOUNTER
Oncology Nurse Triage - Sickle Cell Pain Crisis:    Situation: Jennifer  calling about Sickle Cell Pain Crisis    Background:     Patient's last infusion was 06/16/23  Last clinic visit date:06/05/23 wMikael Mantilla  Next follow-up appt on 07/14/23 w/Patricia Mantilla  Does patient have active treatment plan?  Yes    (If pt has been seen in ED or infusion in last 3 days or no showed last clinic visit then does not meet protocol unless specified in pt therapy plan)    Assessment of Symptoms:  Onset/Duration of symptoms: 1 day    Is it typical sickle cell pain? Yes   Location: Back   Character: Sharp           Intensity: 9/10    Any radiation of pain, numbness, tingling, weakness, warmth, swelling, discoloration of extremities?No     Fever?No  (if yes max temperature recorded in last 24 hours):      Chest Pain Present: No     Shortness of breath: No     Other home therapies tried: HEAT/HEATING PAD and WARM BATH     Last home medication taken and when: Oxycodone 0600    Any Refills Needed?: Yes, oxycodone    Does patient have transportation & length of time to get to clinic: No       Grades for reference:       Grade 1 -   o Patient has active treatment plan.   o Patients last scheduled clinic appointment was attended  o Patient has not been seen in infusion or ED in the last 3 days (clarify exclusions in therapy plans)   o Afebrile   o Typical sickle cell pain   o Home medications have been tried   o No other symptoms   o     Recommendations:   0711 Added to infusion wait list for IVF/pain meds per protocol.  0736 Contacted pt to inform her available slot at 10:30 am with [facility]. PT confirmed she is able to make that appt.   0737 Messages sent to SW and scheduling, to assist in setting up transportation and to add pt to schedule.       If symptoms worsen, and/or you experience fever, chills, SOB, chest pain, cough, n/v, dizziness, numbness, swelling, discoloration of extremities, then seek emergency evaluation in Emergency  Department.     Please note, if you are late for your appt, you risk losing your infusion appt as it may delay another patient's infusion who arrived on time.

## 2023-06-19 NOTE — PROGRESS NOTES
Infusion Nursing Note:  Jennifer Cervantes presents today for add-on infusion.    Patient seen by provider today: No   present during visit today: Not Applicable.    Note: Pt here today for add-on IVF/pain meds. VSS. Meds and allergies reviewed. Pt reports 9/10 generalized back pain, but otherwise feels well and denies fever/chills, n/v/d, or respiratory symptoms. Pt received 1L LR over 2 hours and 1 mg Dilaudid IVP x3 doses (total dose 3 mg). Pt received 25 mg Benadryl PO at end of infusion for itching. Pt rated pain 3/10 at time of discharge.      Intravenous Access:  Implanted Port.    Treatment Conditions:  Not Applicable.      Post Infusion Assessment:  Patient tolerated infusion without incident.  Blood return noted pre and post infusion.  Site patent and intact, free from redness, edema or discomfort.  No evidence of extravasations.  Access discontinued per protocol.       Discharge Plan:   Patient discharged in stable condition accompanied by: self.  Departure Mode: Ambulatory.      Jaida Kelly RN

## 2023-06-19 NOTE — TELEPHONE ENCOUNTER
Narcotic Refill Request    Medication(s) requested:  Oxycodone  Person Requesting Refill:Patient  What pain is the medication treating: Sickle Cell pain  How is the medication being taken?:Every 6hrs  Does pt have enough for today? Will be out this afternoon  Is pain being adequately controlled on the current regimen?: Yes  Experiencing any side effects from medication?: No    Last clinic visit date:06/05/23 w/Patricia Mantilla  Next follow-up appt on 07/14/23 w/Patricia Mantilla  Any No Show Visits:No  Last fill date and by whom:  06/12/23 by Patricia Mantilla   Reviewed: No Access    Routed/Paged provider:

## 2023-06-19 NOTE — PROGRESS NOTES
Social Work - Transportation  [facility]    Data/Intervention:  Patient Name: Jennifer Cervantes  /Age: 2000 (23 year old)    Referral From: Ange Abbasi RN  Reason for Referral:  support requested for patient transportation needs for today's appointment at 10:30am.  Assessment:  called [employer] to arrange ride through patient's insurance. [employer] arranged  for patient from home with [employer]. Patient will need to call [phone] when ready for return ride home.  Plan: Patient is aware of the transportation plan.  available to assist with any other needs.  CARLOS Chavez,UDAY  Hematology/Oncology Social Worker  Phone:[phone] Pager: [phone]

## 2023-06-22 ENCOUNTER — INFUSION THERAPY VISIT (OUTPATIENT)
Dept: INFUSION THERAPY | Facility: CLINIC | Age: 23
End: 2023-06-22
Attending: PEDIATRICS
Payer: COMMERCIAL

## 2023-06-22 ENCOUNTER — THERAPY VISIT (OUTPATIENT)
Dept: OCCUPATIONAL THERAPY | Facility: CLINIC | Age: 23
End: 2023-06-22
Payer: COMMERCIAL

## 2023-06-22 ENCOUNTER — PATIENT OUTREACH (OUTPATIENT)
Dept: CARE COORDINATION | Facility: CLINIC | Age: 23
End: 2023-06-22

## 2023-06-22 ENCOUNTER — NURSE TRIAGE (OUTPATIENT)
Dept: ONCOLOGY | Facility: CLINIC | Age: 23
End: 2023-06-22

## 2023-06-22 VITALS
HEART RATE: 84 BPM | OXYGEN SATURATION: 97 % | RESPIRATION RATE: 16 BRPM | DIASTOLIC BLOOD PRESSURE: 62 MMHG | SYSTOLIC BLOOD PRESSURE: 104 MMHG | TEMPERATURE: 98.3 F

## 2023-06-22 DIAGNOSIS — R25.2 SPASTICITY: ICD-10-CM

## 2023-06-22 DIAGNOSIS — I69.351 HEMIPLEGIA AND HEMIPARESIS FOLLOWING CEREBRAL INFARCTION AFFECTING RIGHT DOMINANT SIDE (H): Primary | ICD-10-CM

## 2023-06-22 DIAGNOSIS — G81.10 SPASTIC HEMIPLEGIA, UNSPECIFIED ETIOLOGY, UNSPECIFIED LATERALITY (H): Primary | ICD-10-CM

## 2023-06-22 DIAGNOSIS — D57.00 SICKLE CELL PAIN CRISIS (H): ICD-10-CM

## 2023-06-22 PROCEDURE — 250N000011 HC RX IP 250 OP 636: Mod: JZ | Performed by: PEDIATRICS

## 2023-06-22 PROCEDURE — 250N000013 HC RX MED GY IP 250 OP 250 PS 637: Performed by: PEDIATRICS

## 2023-06-22 PROCEDURE — 97763 ORTHC/PROSTC MGMT SBSQ ENC: CPT | Mod: GO

## 2023-06-22 PROCEDURE — 96374 THER/PROPH/DIAG INJ IV PUSH: CPT

## 2023-06-22 PROCEDURE — 96361 HYDRATE IV INFUSION ADD-ON: CPT

## 2023-06-22 PROCEDURE — 258N000003 HC RX IP 258 OP 636: Performed by: PEDIATRICS

## 2023-06-22 PROCEDURE — 97140 MANUAL THERAPY 1/> REGIONS: CPT | Mod: GO

## 2023-06-22 PROCEDURE — 96376 TX/PRO/DX INJ SAME DRUG ADON: CPT

## 2023-06-22 RX ORDER — HEPARIN SODIUM,PORCINE 10 UNIT/ML
5 VIAL (ML) INTRAVENOUS
Status: CANCELLED | OUTPATIENT
Start: 2023-07-01

## 2023-06-22 RX ORDER — HEPARIN SODIUM (PORCINE) LOCK FLUSH IV SOLN 100 UNIT/ML 100 UNIT/ML
5 SOLUTION INTRAVENOUS
Status: DISCONTINUED | OUTPATIENT
Start: 2023-06-22 | End: 2023-06-22 | Stop reason: HOSPADM

## 2023-06-22 RX ORDER — HEPARIN SODIUM (PORCINE) LOCK FLUSH IV SOLN 100 UNIT/ML 100 UNIT/ML
5 SOLUTION INTRAVENOUS
Status: CANCELLED | OUTPATIENT
Start: 2023-07-01

## 2023-06-22 RX ORDER — DIPHENHYDRAMINE HCL 25 MG
25 CAPSULE ORAL
Status: CANCELLED
Start: 2023-07-01

## 2023-06-22 RX ORDER — ONDANSETRON 4 MG/1
8 TABLET, FILM COATED ORAL
Status: CANCELLED
Start: 2023-07-01

## 2023-06-22 RX ORDER — DIPHENHYDRAMINE HCL 25 MG
25 CAPSULE ORAL
Status: COMPLETED | OUTPATIENT
Start: 2023-06-22 | End: 2023-06-22

## 2023-06-22 RX ADMIN — HYDROMORPHONE HYDROCHLORIDE 1 MG: 1 INJECTION, SOLUTION INTRAMUSCULAR; INTRAVENOUS; SUBCUTANEOUS at 10:42

## 2023-06-22 RX ADMIN — HYDROMORPHONE HYDROCHLORIDE 1 MG: 1 INJECTION, SOLUTION INTRAMUSCULAR; INTRAVENOUS; SUBCUTANEOUS at 11:35

## 2023-06-22 RX ADMIN — Medication 5 ML: at 11:51

## 2023-06-22 RX ADMIN — DIPHENHYDRAMINE HYDROCHLORIDE 25 MG: 25 CAPSULE ORAL at 09:39

## 2023-06-22 RX ADMIN — HYDROMORPHONE HYDROCHLORIDE 1 MG: 1 INJECTION, SOLUTION INTRAMUSCULAR; INTRAVENOUS; SUBCUTANEOUS at 09:40

## 2023-06-22 RX ADMIN — SODIUM CHLORIDE, POTASSIUM CHLORIDE, SODIUM LACTATE AND CALCIUM CHLORIDE 1000 ML: 600; 310; 30; 20 INJECTION, SOLUTION INTRAVENOUS at 09:44

## 2023-06-22 ASSESSMENT — PAIN SCALES - GENERAL
PAINLEVEL: MILD PAIN (3)
PAINLEVEL: EXTREME PAIN (9)

## 2023-06-22 NOTE — PATIENT INSTRUCTIONS
Dear Jennifer Cervantes    Thank you for choosing [facility] ([facility]) for your infusion.  The following information is a summary of our appointment as well as important reminders.        We look forward in seeing you on your next appointment here at [facility] ([facility]).  Please don t hesitate to call us at [phone] to reschedule any of your appointments or to speak with one of the [facility] registered nurses.  It was a pleasure taking care of you today.    Sincerely,    [facility]  [facility]  [address] [place] [address]  [address]  Phone:  [phone]

## 2023-06-22 NOTE — PROGRESS NOTES
Social Work - Transportation  [facility]    Data/Intervention:  Patient Name: Jennifer Cervantes  /Age: 2000 (23 year old)    Referral From: [facility]  Reason for Referral:  support requested for patient transportation needs for ride home after 9 am infusion appointment.  Assessment: SW called patients [employer] insurance to schedule will call ride home after infusion appointment. Per [employer] Rep patient already has a round trip ride scheduled for today. Patient came to [facility] for an 8 am appointment and utilized [employer]. Patient still has her will call ride home and will have to call [phone] to activate ride once done with appointment. SW attempted to reach patient and share this information, but left detailed VM and encouraged call back if they had any questions.   Plan: Patient is aware of the transportation plan.  available to assist with any other needs.  CARLOS Chavez,UDAY  Hematology/Oncology Social Worker  Phone:[phone] Pager: [phone]

## 2023-06-22 NOTE — TELEPHONE ENCOUNTER
Oncology Nurse Triage - Sickle Cell Pain Crisis:  Situation: Jennifer  calling about Sickle Cell Pain Crisis, requesting to be added on for IV fluids and pain medicine    Background:   Patient's last infusion was 6/19/23  Last clinic visit date:6/5/23 with Patricia Mantilla CNP  Does patient have active treatment plan?  Yes      Assessment of Symptoms:  Onset/Duration of symptoms: 1 day    Is it typical sickle cell pain? Yes   Location: entire back and R hip no radiation of pain  Character: Sharp           Intensity: 10/10    Any radiation of pain, numbness, tingling, weakness, warmth, swelling, discoloration of arms or legs?No     Fever?No  Chest Pain Present: No   Shortness of breath: No     Other home therapies tried: HEAT/HEATING PAD and WARM BATH     Last home medication taken and when: 0600 oxycodone    Any Refills Needed?: No     Does patient have transportation & length of time to get to clinic: Yes Has apt at 0815. If goes home after apt., then she will need transportation back to the clinic for infusion if she can get in.    Recommendations:   If you do not hear from the infusion center by 2pm then you will not be able to get in for an infusion today. If symptoms worsen while waiting for call back, and/or you experience fever, chills, SOB, chest pain, cough, n/v, dizziness, numbness, swelling, discoloration of extremities, then seek emergency evaluation in Emergency Department.     Added to Infusion Wait list

## 2023-06-22 NOTE — TELEPHONE ENCOUNTER
Call to pt to offer 9 or 0930 appt in [facility] infusion per charge nurse, Cristela. Pt still in parking lot and will come to 9am infusion.   0855 message sent to scheduling and updated charge nurse.

## 2023-06-22 NOTE — PROGRESS NOTES
Infusion Nursing Note:  Jennifer Cervantes presents today for   Chief Complaint   Patient presents with     Infusion     Fluids and diluadid      .    Patient seen by provider today: No   present during visit today: Not Applicable.    Note: Patient identification verified by name and date of birth.  Assessment completed.  Vitals recorded in Doc Flowsheets.  Patient was provided with education regarding medication/procedure and possible side effects.  Patient verbalized understanding.    During [facility]'s [facility] appointment, orders from Dr. Duncan were completed.  Frequency: 2 times per week. Pre meds Benadryl, Given LR 1000 ml at 50 ml per hour and Dilaudid PRN Q hour for total of 3 doses. per orders/patient request.     .  Intravenous Access:  Implanted Port.        Post Infusion Assessment:  Patient tolerated infusion without incident.  De accessed port with blood return.        Discharge Plan:   Departure Mode: Ambulatory.      Elena Kelly RN

## 2023-06-26 ENCOUNTER — PATIENT OUTREACH (OUTPATIENT)
Dept: CARE COORDINATION | Facility: CLINIC | Age: 23
End: 2023-06-26
Payer: COMMERCIAL

## 2023-06-26 ENCOUNTER — INFUSION THERAPY VISIT (OUTPATIENT)
Dept: TRANSPLANT | Facility: CLINIC | Age: 23
End: 2023-06-26
Attending: PEDIATRICS
Payer: COMMERCIAL

## 2023-06-26 ENCOUNTER — NURSE TRIAGE (OUTPATIENT)
Dept: ONCOLOGY | Facility: CLINIC | Age: 23
End: 2023-06-26
Payer: COMMERCIAL

## 2023-06-26 VITALS
SYSTOLIC BLOOD PRESSURE: 114 MMHG | DIASTOLIC BLOOD PRESSURE: 77 MMHG | TEMPERATURE: 98 F | OXYGEN SATURATION: 98 % | RESPIRATION RATE: 16 BRPM | HEART RATE: 89 BPM

## 2023-06-26 DIAGNOSIS — D57.00 SICKLE CELL PAIN CRISIS (H): ICD-10-CM

## 2023-06-26 DIAGNOSIS — G81.10 SPASTIC HEMIPLEGIA, UNSPECIFIED ETIOLOGY, UNSPECIFIED LATERALITY (H): Primary | ICD-10-CM

## 2023-06-26 PROCEDURE — 96361 HYDRATE IV INFUSION ADD-ON: CPT

## 2023-06-26 PROCEDURE — 96376 TX/PRO/DX INJ SAME DRUG ADON: CPT

## 2023-06-26 PROCEDURE — 258N000003 HC RX IP 258 OP 636: Performed by: PEDIATRICS

## 2023-06-26 PROCEDURE — 250N000011 HC RX IP 250 OP 636: Performed by: PEDIATRICS

## 2023-06-26 PROCEDURE — 250N000013 HC RX MED GY IP 250 OP 250 PS 637: Performed by: PEDIATRICS

## 2023-06-26 PROCEDURE — 96374 THER/PROPH/DIAG INJ IV PUSH: CPT

## 2023-06-26 RX ORDER — HEPARIN SODIUM (PORCINE) LOCK FLUSH IV SOLN 100 UNIT/ML 100 UNIT/ML
5 SOLUTION INTRAVENOUS
Status: CANCELLED | OUTPATIENT
Start: 2023-07-01

## 2023-06-26 RX ORDER — ONDANSETRON 4 MG/1
8 TABLET, FILM COATED ORAL
Status: CANCELLED
Start: 2023-07-01

## 2023-06-26 RX ORDER — OXYCODONE HYDROCHLORIDE 15 MG/1
15 TABLET ORAL EVERY 4 HOURS PRN
Qty: 22 TABLET | Refills: 0 | Status: SHIPPED | OUTPATIENT
Start: 2023-06-26 | End: 2023-07-03

## 2023-06-26 RX ORDER — HEPARIN SODIUM,PORCINE 10 UNIT/ML
5 VIAL (ML) INTRAVENOUS
Status: CANCELLED | OUTPATIENT
Start: 2023-07-01

## 2023-06-26 RX ORDER — DIPHENHYDRAMINE HCL 25 MG
25 CAPSULE ORAL
Status: COMPLETED | OUTPATIENT
Start: 2023-06-26 | End: 2023-06-26

## 2023-06-26 RX ORDER — DIPHENHYDRAMINE HCL 25 MG
25 CAPSULE ORAL
Status: CANCELLED
Start: 2023-07-01

## 2023-06-26 RX ADMIN — SODIUM CHLORIDE, POTASSIUM CHLORIDE, SODIUM LACTATE AND CALCIUM CHLORIDE 1000 ML: 600; 310; 30; 20 INJECTION, SOLUTION INTRAVENOUS at 13:21

## 2023-06-26 RX ADMIN — HYDROMORPHONE HYDROCHLORIDE 1 MG: 1 INJECTION, SOLUTION INTRAMUSCULAR; INTRAVENOUS; SUBCUTANEOUS at 13:25

## 2023-06-26 RX ADMIN — HYDROMORPHONE HYDROCHLORIDE 1 MG: 1 INJECTION, SOLUTION INTRAMUSCULAR; INTRAVENOUS; SUBCUTANEOUS at 14:24

## 2023-06-26 RX ADMIN — DIPHENHYDRAMINE HYDROCHLORIDE 25 MG: 25 CAPSULE ORAL at 13:27

## 2023-06-26 RX ADMIN — HYDROMORPHONE HYDROCHLORIDE 1 MG: 1 INJECTION, SOLUTION INTRAMUSCULAR; INTRAVENOUS; SUBCUTANEOUS at 15:22

## 2023-06-26 ASSESSMENT — PAIN SCALES - GENERAL: PAINLEVEL: EXTREME PAIN (9)

## 2023-06-26 NOTE — TELEPHONE ENCOUNTER
Narcotic Refill Request    Medication(s) requested:  Oxycodone   Person Requesting Refill:Jennifer   What pain is the medication treating: Sickle cell pain   How is the medication being taken?:Takes every 6 hours as needed   Does pt have enough for today? Will be out this afternoon   Is pain being adequately controlled on the current regimen?: Yes   Experiencing any side effects from medication?: No     Date of most recent appointment:  6/5/23 Patricia Mantilla   Any No Show Visits: no   Next appointment:   7/14/23 Patricia Mantilla   Last fill date and by whom:  6/19/23 Dr Duncan    Reviewed:  No Access     Routed provider: Patricia Mantilla

## 2023-06-26 NOTE — TELEPHONE ENCOUNTER
BMT can take Jennifer at 1:30 for IVF/pain meds     Jennifer said she can make it to the 1:30 appt.     Message sent to Social work to set up ride     Message sent to [facility] to schedule.

## 2023-06-26 NOTE — TELEPHONE ENCOUNTER
Oncology Nurse Triage - Sickle Cell Pain Crisis:    Situation: Jennifer  calling about Sickle Cell Pain Crisis, requesting to be added on for IV fluids and pain medicine    Background:     Patient's last infusion was 6/22/23  Last clinic visit date:6/5/23 Patricia Mantilla   Does patient have active treatment plan?  Yes      Assessment of Symptoms:  Onset/Duration of symptoms: 3 day    Is it typical sickle cell pain? Yes   Location: back   Character: Sharp           Intensity: 9/10    Any radiation of pain, numbness, tingling, weakness, warmth, swelling, discoloration of arms or legs?No     Fever?No  (if yes max temperature recorded in last 24 hours):      Chest Pain Present: No     Shortness of breath: No     Other home therapies tried: HEAT/HEATING PAD and WARM BATH     Last home medication taken and when: 6:00am took oxycodone and regular AM meds     Any Refills Needed?: Yes     Does patient have transportation & length of time to get to clinic: Yes needs help with  transportation to clinic          Recommendations:     Name placed on sickle cell list     If you do not hear from the infusion center by 2pm then you will not be able to get in for an infusion today. If symptoms worsen while waiting for call back, and/or you experience fever, chills, SOB, chest pain, cough, n/v, dizziness, numbness, swelling, discoloration of extremities, then seek emergency evaluation in Emergency Department.     Please note, if you are late for your appt, you risk losing your infusion appt as it may delay another patient's infusion who arrived on time.

## 2023-06-26 NOTE — PROGRESS NOTES
Social Work - Transportation  [facility]    Data/Intervention:  Patient Name: Jennifer Cervantes  /Age: 2000 (23 year old)    Referral From: Ange Triage RN  Reason for Referral:  support requested for patient transportation needs for same day appointment.  Assessment:  called [employer] to arrange ride through patient's insurance. [employer] arranged  for patient from home with [employer]. Patient will need to call [phone] when ready for return ride home.  Plan: Patient is aware of the transportation plan.  available to assist with any other needs.    CARLOS Chavez,LGUDAY  Hematology/Oncology Social Worker  Phone:[phone] Pager: [phone]

## 2023-06-29 ENCOUNTER — NURSE TRIAGE (OUTPATIENT)
Dept: ONCOLOGY | Facility: CLINIC | Age: 23
End: 2023-06-29

## 2023-06-29 ENCOUNTER — THERAPY VISIT (OUTPATIENT)
Dept: OCCUPATIONAL THERAPY | Facility: CLINIC | Age: 23
End: 2023-06-29
Payer: COMMERCIAL

## 2023-06-29 ENCOUNTER — INFUSION THERAPY VISIT (OUTPATIENT)
Dept: TRANSPLANT | Facility: CLINIC | Age: 23
End: 2023-06-29
Attending: PEDIATRICS
Payer: COMMERCIAL

## 2023-06-29 DIAGNOSIS — G81.10 SPASTIC HEMIPLEGIA, UNSPECIFIED ETIOLOGY, UNSPECIFIED LATERALITY (H): Primary | ICD-10-CM

## 2023-06-29 DIAGNOSIS — D57.00 SICKLE CELL PAIN CRISIS (H): ICD-10-CM

## 2023-06-29 DIAGNOSIS — I69.351 HEMIPLEGIA AND HEMIPARESIS FOLLOWING CEREBRAL INFARCTION AFFECTING RIGHT DOMINANT SIDE (H): Primary | ICD-10-CM

## 2023-06-29 DIAGNOSIS — R25.2 SPASTICITY: ICD-10-CM

## 2023-06-29 PROCEDURE — 250N000011 HC RX IP 250 OP 636: Performed by: PEDIATRICS

## 2023-06-29 PROCEDURE — 96374 THER/PROPH/DIAG INJ IV PUSH: CPT

## 2023-06-29 PROCEDURE — 96361 HYDRATE IV INFUSION ADD-ON: CPT

## 2023-06-29 PROCEDURE — 258N000003 HC RX IP 258 OP 636: Performed by: PEDIATRICS

## 2023-06-29 PROCEDURE — 96376 TX/PRO/DX INJ SAME DRUG ADON: CPT

## 2023-06-29 PROCEDURE — 97140 MANUAL THERAPY 1/> REGIONS: CPT | Mod: GO

## 2023-06-29 RX ORDER — HEPARIN SODIUM (PORCINE) LOCK FLUSH IV SOLN 100 UNIT/ML 100 UNIT/ML
5 SOLUTION INTRAVENOUS
Status: DISCONTINUED | OUTPATIENT
Start: 2023-06-29 | End: 2023-06-29 | Stop reason: HOSPADM

## 2023-06-29 RX ORDER — HEPARIN SODIUM,PORCINE 10 UNIT/ML
5 VIAL (ML) INTRAVENOUS
Status: CANCELLED | OUTPATIENT
Start: 2023-06-29

## 2023-06-29 RX ORDER — HEPARIN SODIUM (PORCINE) LOCK FLUSH IV SOLN 100 UNIT/ML 100 UNIT/ML
5 SOLUTION INTRAVENOUS
Status: CANCELLED | OUTPATIENT
Start: 2023-06-29

## 2023-06-29 RX ORDER — HEPARIN SODIUM (PORCINE) LOCK FLUSH IV SOLN 100 UNIT/ML 100 UNIT/ML
5 SOLUTION INTRAVENOUS
Status: CANCELLED | OUTPATIENT
Start: 2023-07-01

## 2023-06-29 RX ORDER — ONDANSETRON 4 MG/1
8 TABLET, FILM COATED ORAL
Status: CANCELLED
Start: 2023-07-01

## 2023-06-29 RX ORDER — DIPHENHYDRAMINE HCL 25 MG
25 CAPSULE ORAL
Status: CANCELLED
Start: 2023-07-01

## 2023-06-29 RX ORDER — HEPARIN SODIUM,PORCINE 10 UNIT/ML
5 VIAL (ML) INTRAVENOUS
Status: CANCELLED | OUTPATIENT
Start: 2023-07-01

## 2023-06-29 RX ADMIN — SODIUM CHLORIDE, POTASSIUM CHLORIDE, SODIUM LACTATE AND CALCIUM CHLORIDE 1000 ML: 600; 310; 30; 20 INJECTION, SOLUTION INTRAVENOUS at 09:43

## 2023-06-29 RX ADMIN — HYDROMORPHONE HYDROCHLORIDE 1 MG: 1 INJECTION, SOLUTION INTRAMUSCULAR; INTRAVENOUS; SUBCUTANEOUS at 10:50

## 2023-06-29 RX ADMIN — HYDROMORPHONE HYDROCHLORIDE 1 MG: 1 INJECTION, SOLUTION INTRAMUSCULAR; INTRAVENOUS; SUBCUTANEOUS at 09:43

## 2023-06-29 RX ADMIN — HYDROMORPHONE HYDROCHLORIDE 1 MG: 1 INJECTION, SOLUTION INTRAMUSCULAR; INTRAVENOUS; SUBCUTANEOUS at 11:46

## 2023-06-29 RX ADMIN — Medication 5 ML: at 12:14

## 2023-06-29 NOTE — PROGRESS NOTES
Infusion Nursing Note:  Jennifer Cervantes presents today for IV fluids and pain medications.    Patient seen by provider today: No   present during visit today: Not Applicable.    Note:   Pt received one liter LR over 2 hours.    Pt received dilaudid 1 mg every hour for a total of three doses (3 mg dilaudid total).      Intravenous Access:  Implanted port    Treatment Conditions:  Not Applicable.      Post Infusion Assessment:  Patient tolerated infusion without incident.  Blood return noted pre and post infusion.  Site patent and intact, free from redness, edema or discomfort.  No evidence of extravasations.  Access discontinued per protocol.       Discharge Plan:   Discharge instructions reviewed with: Patient.  Patient and/or family verbalized understanding of discharge instructions and all questions answered.  Patient discharged in stable condition accompanied by: self.  Departure Mode: Ambulatory.      Jamaica Napoles RN

## 2023-06-29 NOTE — TELEPHONE ENCOUNTER
Oncology Nurse Triage - Sickle Cell Pain Crisis:  Situation: Jennifer  calling about Sickle Cell Pain Crisis, requesting to be added on for IV fluids and pain medicine    Background:   Patient's last infusion was 6/26/23  Last clinic visit date:6/5/23  Does patient have active treatment plan?  Yes    Assessment of Symptoms:  Onset/Duration of symptoms: 2 day    Is it typical sickle cell pain? Yes   Location: all over  Character: Sharp           Intensity: 10/10    Any radiation of pain, numbness, tingling, weakness, warmth, swelling, discoloration of arms or legs?No     Fever?No    Chest Pain Present: No     Shortness of breath: No     Other home therapies tried: HEAT/HEATING PAD     Last home medication taken and when: 0600 Oxycodone    Any Refills Needed?: No     Does patient have transportation & length of time to get to clinic: No -has appt at 1230pm and has ride set up at 9am. If pt cannot get in before 9am, then will not have a ride and will need transportation set up.     Recommendations:   Added to infusion wait list.   0740 call to pt to see what time would work for infusion, as pt had a ride set up for previous appt. Pt states she has not cancelled 9am cab ride, will keep that ride and be to infusion by 10am. Message sent to scheduling at 0743 and confirmed with jonny Berumen RN in BMT infusion of time. Pt aware infusion may run into her physical appt time, pt states she is needing infusion for pain more at this time. Writer will message Ally Longoria to notify pt may be late.    If you do not hear from the infusion center by 2pm then you will not be able to get in for an infusion today. If symptoms worsen while waiting for call back, and/or you experience fever, chills, SOB, chest pain, cough, n/v, dizziness, numbness, swelling, discoloration of extremities, then seek emergency evaluation in Emergency Department.     Please note, if you are late for your appt, you risk losing your infusion appt as it may  delay another patient's infusion who arrived on time.

## 2023-06-29 NOTE — NURSING NOTE
"Oncology Rooming Note    June 29, 2023 10:54 AM   Jennifer Cervantes is a 23 year old female who presents for:    Chief Complaint   Patient presents with     Infusion     Add on infusion appointment for IV fluids and pain medications. Hx sickle cell disease.     Initial Vitals: LMP  (LMP Unknown)  Estimated body mass index is 25.37 kg/m  as calculated from the following:    Height as of 5/10/23: 1.626 m (5' 4\").    Weight as of 6/16/23: 67 kg (147 lb 12.8 oz). There is no height or weight on file to calculate BSA.  Data Unavailable Comment: Data Unavailable   No LMP recorded (lmp unknown). Patient has had an implant.  Allergies reviewed: Yes  Medications reviewed: Yes    Medications: Medication refills not needed today.  Pharmacy name entered into [facility]: [facility] [place], [place] - [address] [facility] [address]    Clinical concerns: none       Jamaica Napoles RN              "

## 2023-07-03 ENCOUNTER — NURSE TRIAGE (OUTPATIENT)
Dept: ONCOLOGY | Facility: CLINIC | Age: 23
End: 2023-07-03
Payer: COMMERCIAL

## 2023-07-03 ENCOUNTER — INFUSION THERAPY VISIT (OUTPATIENT)
Dept: INFUSION THERAPY | Facility: CLINIC | Age: 23
End: 2023-07-03
Attending: PEDIATRICS
Payer: COMMERCIAL

## 2023-07-03 ENCOUNTER — PATIENT OUTREACH (OUTPATIENT)
Dept: CARE COORDINATION | Facility: CLINIC | Age: 23
End: 2023-07-03
Payer: COMMERCIAL

## 2023-07-03 VITALS
RESPIRATION RATE: 16 BRPM | OXYGEN SATURATION: 96 % | TEMPERATURE: 98 F | DIASTOLIC BLOOD PRESSURE: 67 MMHG | HEART RATE: 80 BPM | SYSTOLIC BLOOD PRESSURE: 112 MMHG

## 2023-07-03 DIAGNOSIS — D57.00 SICKLE CELL PAIN CRISIS (H): ICD-10-CM

## 2023-07-03 DIAGNOSIS — G81.10 SPASTIC HEMIPLEGIA, UNSPECIFIED ETIOLOGY, UNSPECIFIED LATERALITY (H): Primary | ICD-10-CM

## 2023-07-03 PROCEDURE — 96374 THER/PROPH/DIAG INJ IV PUSH: CPT

## 2023-07-03 PROCEDURE — 250N000013 HC RX MED GY IP 250 OP 250 PS 637: Performed by: PEDIATRICS

## 2023-07-03 PROCEDURE — 96361 HYDRATE IV INFUSION ADD-ON: CPT

## 2023-07-03 PROCEDURE — 258N000003 HC RX IP 258 OP 636: Performed by: PEDIATRICS

## 2023-07-03 PROCEDURE — 250N000011 HC RX IP 250 OP 636: Performed by: PEDIATRICS

## 2023-07-03 PROCEDURE — 96376 TX/PRO/DX INJ SAME DRUG ADON: CPT

## 2023-07-03 RX ORDER — ONDANSETRON 4 MG/1
8 TABLET, FILM COATED ORAL
Status: CANCELLED
Start: 2023-10-01

## 2023-07-03 RX ORDER — HEPARIN SODIUM (PORCINE) LOCK FLUSH IV SOLN 100 UNIT/ML 100 UNIT/ML
5 SOLUTION INTRAVENOUS
Status: DISCONTINUED | OUTPATIENT
Start: 2023-07-03 | End: 2023-07-03 | Stop reason: HOSPADM

## 2023-07-03 RX ORDER — HEPARIN SODIUM,PORCINE 10 UNIT/ML
5 VIAL (ML) INTRAVENOUS
Status: CANCELLED | OUTPATIENT
Start: 2023-10-01

## 2023-07-03 RX ORDER — DIPHENHYDRAMINE HCL 25 MG
25 CAPSULE ORAL
Status: CANCELLED
Start: 2023-10-01

## 2023-07-03 RX ORDER — DIPHENHYDRAMINE HCL 25 MG
25 CAPSULE ORAL
Status: COMPLETED | OUTPATIENT
Start: 2023-07-03 | End: 2023-07-03

## 2023-07-03 RX ORDER — HEPARIN SODIUM (PORCINE) LOCK FLUSH IV SOLN 100 UNIT/ML 100 UNIT/ML
5 SOLUTION INTRAVENOUS
Status: CANCELLED | OUTPATIENT
Start: 2023-10-01

## 2023-07-03 RX ORDER — OXYCODONE HYDROCHLORIDE 15 MG/1
15 TABLET ORAL EVERY 4 HOURS PRN
Qty: 22 TABLET | Refills: 0 | Status: SHIPPED | OUTPATIENT
Start: 2023-07-03 | End: 2023-07-10

## 2023-07-03 RX ADMIN — HYDROMORPHONE HYDROCHLORIDE 1 MG: 1 INJECTION, SOLUTION INTRAMUSCULAR; INTRAVENOUS; SUBCUTANEOUS at 14:25

## 2023-07-03 RX ADMIN — Medication 5 ML: at 16:34

## 2023-07-03 RX ADMIN — SODIUM CHLORIDE, POTASSIUM CHLORIDE, SODIUM LACTATE AND CALCIUM CHLORIDE 1000 ML: 600; 310; 30; 20 INJECTION, SOLUTION INTRAVENOUS at 14:23

## 2023-07-03 RX ADMIN — DIPHENHYDRAMINE HYDROCHLORIDE 25 MG: 25 CAPSULE ORAL at 14:22

## 2023-07-03 RX ADMIN — HYDROMORPHONE HYDROCHLORIDE 1 MG: 1 INJECTION, SOLUTION INTRAMUSCULAR; INTRAVENOUS; SUBCUTANEOUS at 15:26

## 2023-07-03 RX ADMIN — HYDROMORPHONE HYDROCHLORIDE 1 MG: 1 INJECTION, SOLUTION INTRAMUSCULAR; INTRAVENOUS; SUBCUTANEOUS at 16:25

## 2023-07-03 NOTE — PROGRESS NOTES
Social Work - Transportation  [facility]    Data/Intervention:  Patient Name: Jennifer Cervantes  Goes By: Jennifer    /Age: 2000 (23 year old)    Referral From: [facility] Triage  Reason for Referral:  support requested for patient transportation needs for today's appointment at 2pm.  Assessment:  called [employer] to arrange ride through patient's insurance. [employer] arranged  for patient from home with [employer]. Patient will need to call [phone] when ready for return ride home.  Plan: Patient is aware of the transportation plan.  available to assist with any other needs.    CARLOS Chavez,LGSW  Hematology/Oncology Social Worker  Phone:[phone] Pager: [phone]

## 2023-07-03 NOTE — PATIENT INSTRUCTIONS
Dear Jennifer Cervantes    Thank you for choosing [facility] ([facility]) for your infusion.  The following information is a summary of our appointment as well as important reminders.          We look forward in seeing you on your next appointment here at [facility] ([facility]).  Please don t hesitate to call us at [phone] to reschedule any of your appointments or to speak with one of the [facility] registered nurses.  It was a pleasure taking care of you today.    Sincerely,    [facility]  [address] [place] [address]  [id number]  Phone:  [phone]

## 2023-07-03 NOTE — TELEPHONE ENCOUNTER
Oncology Nurse Triage - Sickle Cell Pain Crisis:    Situation: Jennifer  calling about Sickle Cell Pain Crisis, requesting to be added on for IV fluids and pain medicine    Background:     Patient's last infusion was 6/29/23  Last clinic visit date:6/5/23 Patricia Mantilla CNP  Does patient have active treatment plan?  Yes    Assessment of Symptoms:  Onset/Duration of symptoms: 1 day    Is it typical sickle cell pain? Yes   Location: lower back  Character: Sharp           Intensity: 9/10    Any radiation of pain, numbness, tingling, weakness, warmth, swelling, discoloration of arms or legs?No     Fever?No    Chest Pain Present: No     Shortness of breath: No     Other home therapies tried: HEAT/HEATING PAD     Last home medication taken and when: 6;00am    Any Refills Needed?: Yes , oxycodone, tries to take every 6hours.     Does patient have transportation & length of time to get to clinic: No     Recommendations:   Meets protocol    0940 Appt available in [facility] for 2:00pm, Jennifer in agreement with plan.     0942  Abdullahi gonzalez to assist pt with transportation.     0967 Scheduling request sent        Please note, if you are late for your appt, you risk losing your infusion appt as it may delay another patient's infusion who arrived on time.

## 2023-07-03 NOTE — TELEPHONE ENCOUNTER
Narcotic Refill Request    Medication(s) requested:  Oxycodone  Person Requesting Refill:Jennifer   What pain is the medication treating: sickle cell pain  How is the medication being taken?:Tries to take 1 tablet every 6hours  Does pt have enough for today?no  Is pain being adequately controlled on the current regimen?: okay, requires intermittent IVF/pain during week  Experiencing any side effects from medication?: denies    Date of most recent appointment:  6/5/23  Any No Show Visits:none recently  Next appointment:  7/14/23 Patricia Mantilla  Last fill date and by whom:  6/26/23 Patricia Mantilla CNP   Reviewed:       Send to provider: Patricia Mantilla CNP

## 2023-07-03 NOTE — PROGRESS NOTES
Infusion Nursing Note:  Jennifer Cervantes presents today for IVF, pain meds, anti allergy.    Patient seen by provider today: No   present during visit today: Not Applicable.    Note: As ordered in the therapy plan, below treatment administered:  Administrations This Visit     diphenhydrAMINE (BENADRYL) capsule 25 mg     Admin Date  07/03/2023 Action  $Given Dose  25 mg Route  Oral Administered By  Lien Hathaway RN          heparin 100 unit/mL injection 5 mL     Admin Date  07/03/2023 Action  $Given Dose  5 mL Route  Intracatheter Administered By  Lien Hathaway RN          HYDROmorphone (DILAUDID) injection 1 mg     Admin Date  07/03/2023 Action  $Given Dose  1 mg Route  Intravenous Administered By  Lien Hathaway RN           Admin Date  07/03/2023 Action  $Given Dose  1 mg Route  Intravenous Administered By  Lien Hathaway RN           Admin Date  07/03/2023 Action  $Given Dose  1 mg Route  Intravenous Administered By  Lien Hathaway RN          lactated ringers BOLUS 1,000 mL     Admin Date  07/03/2023 Action  $New Bag Dose  1,000 mL Rate  500 mL/hr Route  Intravenous Administered By  Lien Hathaway RN                  Intravenous Access:  Implanted Port.    Treatment Conditions:  None.      Post Infusion Assessment:  Patient tolerated infusion without incident.  Blood return noted pre and post infusion.  Site patent and intact, free from redness, edema or discomfort.  No evidence of extravasations.         Discharge Plan:   Discharge instructions reviewed with: Patient.  Patient and/or family verbalized understanding of discharge instructions and all questions answered.  AVS to patient via [employer].  Patient will return as needed for next appointment.   Patient discharged in stable condition accompanied by: self.  Departure Mode: Ambulatory.    /67 (BP Location: Left arm, Patient Position: Semi-Short's, Cuff Size: Adult Regular)   Pulse 80   Temp 98  F (36.7  C) (Oral)   Resp 16   LMP  (LMP Unknown)   SpO2 96%      Lien Hathaway, RN

## 2023-07-06 ENCOUNTER — NURSE TRIAGE (OUTPATIENT)
Dept: ONCOLOGY | Facility: CLINIC | Age: 23
End: 2023-07-06
Payer: COMMERCIAL

## 2023-07-06 NOTE — TELEPHONE ENCOUNTER
Oncology Nurse Triage - Sickle Cell Pain Crisis:    Situation: Jennifer  calling about Sickle Cell Pain Crisis    Background:     Patient's last infusion was 07/03/23  Last clinic visit date:06/05/23 wMikael Mantilla  Next follow-up appt on 07/14/23 w/Patricia Mantilla  Does patient have active treatment plan?  Yes    (If pt has been seen in ED or infusion in last 3 days or no showed last clinic visit then does not meet protocol unless specified in pt therapy plan)    Assessment of Symptoms:  Onset/Duration of symptoms: 3 day    Is it typical sickle cell pain? Yes   Location: Both legs  Character: Sharp           Intensity: 10/10    Any radiation of pain, numbness, tingling, weakness, warmth, swelling, discoloration of extremities?No     Fever?No  (if yes max temperature recorded in last 24 hours):      Chest Pain Present: No     Shortness of breath: No     Other home therapies tried: HEAT/HEATING PAD and WARM BATH     Last home medication taken and when: Tylenol and oxycodone 0600    Any Refills Needed?: No     Does patient have transportation & length of time to get to clinic: No       Grades for reference:       Grade 1 -   o Patient has active treatment plan.   o Patients last scheduled clinic appointment was attended  o Patient has not been seen in infusion or ED in the last 3 days (clarify exclusions in therapy plans)   o Afebrile   o Typical sickle cell pain   o Home medications have been tried   o No other symptoms     o     Recommendations:   0725 Added to infusion wait list for IVF/pain meds per protocol.        If you do not hear from the infusion center by 2pm then you will not be able to get in for an infusion today. If symptoms worsen while waiting for call back, and/or you experience fever, chills, SOB, chest pain, cough, n/v, dizziness, numbness, swelling, discoloration of extremities, then seek emergency evaluation in Emergency Department.     Please note, if you are late for your appt, you risk losing your  infusion appt as it may delay another patient's infusion who arrived on time.

## 2023-07-07 ENCOUNTER — NURSE TRIAGE (OUTPATIENT)
Dept: ONCOLOGY | Facility: CLINIC | Age: 23
End: 2023-07-07
Payer: COMMERCIAL

## 2023-07-07 NOTE — TELEPHONE ENCOUNTER
0999 Jennifer calling about appts available.   Still waiting for appt to open up. Informed Jennifer will call her if appt becomes available.

## 2023-07-07 NOTE — TELEPHONE ENCOUNTER
Jennifer calling to ask if any infusion openings. Informed caller that as of right now, we are unable to offer an apt due to being at capacity currently. Pt voiced understanding.

## 2023-07-10 ENCOUNTER — NURSE TRIAGE (OUTPATIENT)
Dept: ONCOLOGY | Facility: CLINIC | Age: 23
End: 2023-07-10
Payer: COMMERCIAL

## 2023-07-10 ENCOUNTER — INFUSION THERAPY VISIT (OUTPATIENT)
Dept: TRANSPLANT | Facility: CLINIC | Age: 23
End: 2023-07-10
Attending: PEDIATRICS
Payer: COMMERCIAL

## 2023-07-10 ENCOUNTER — PATIENT OUTREACH (OUTPATIENT)
Dept: CARE COORDINATION | Facility: CLINIC | Age: 23
End: 2023-07-10
Payer: COMMERCIAL

## 2023-07-10 VITALS
WEIGHT: 144 LBS | BODY MASS INDEX: 24.72 KG/M2 | TEMPERATURE: 98.5 F | OXYGEN SATURATION: 100 % | DIASTOLIC BLOOD PRESSURE: 61 MMHG | HEART RATE: 99 BPM | RESPIRATION RATE: 16 BRPM | SYSTOLIC BLOOD PRESSURE: 122 MMHG

## 2023-07-10 DIAGNOSIS — G81.10 SPASTIC HEMIPLEGIA, UNSPECIFIED ETIOLOGY, UNSPECIFIED LATERALITY (H): Primary | ICD-10-CM

## 2023-07-10 DIAGNOSIS — D57.00 SICKLE CELL PAIN CRISIS (H): ICD-10-CM

## 2023-07-10 PROCEDURE — 250N000013 HC RX MED GY IP 250 OP 250 PS 637: Performed by: PEDIATRICS

## 2023-07-10 PROCEDURE — 96374 THER/PROPH/DIAG INJ IV PUSH: CPT

## 2023-07-10 PROCEDURE — 96361 HYDRATE IV INFUSION ADD-ON: CPT

## 2023-07-10 PROCEDURE — 258N000003 HC RX IP 258 OP 636: Performed by: PEDIATRICS

## 2023-07-10 PROCEDURE — 96376 TX/PRO/DX INJ SAME DRUG ADON: CPT

## 2023-07-10 PROCEDURE — 250N000011 HC RX IP 250 OP 636: Performed by: PEDIATRICS

## 2023-07-10 RX ORDER — DIPHENHYDRAMINE HCL 25 MG
25 CAPSULE ORAL
Status: CANCELLED
Start: 2023-10-01

## 2023-07-10 RX ORDER — HEPARIN SODIUM (PORCINE) LOCK FLUSH IV SOLN 100 UNIT/ML 100 UNIT/ML
5 SOLUTION INTRAVENOUS
Status: CANCELLED | OUTPATIENT
Start: 2023-10-01

## 2023-07-10 RX ORDER — DIPHENHYDRAMINE HCL 25 MG
25 CAPSULE ORAL
Status: COMPLETED | OUTPATIENT
Start: 2023-07-10 | End: 2023-07-10

## 2023-07-10 RX ORDER — ONDANSETRON 4 MG/1
8 TABLET, FILM COATED ORAL
Status: CANCELLED
Start: 2023-10-01

## 2023-07-10 RX ORDER — HEPARIN SODIUM (PORCINE) LOCK FLUSH IV SOLN 100 UNIT/ML 100 UNIT/ML
5 SOLUTION INTRAVENOUS EVERY 8 HOURS
Status: DISCONTINUED | OUTPATIENT
Start: 2023-07-10 | End: 2023-07-10 | Stop reason: HOSPADM

## 2023-07-10 RX ORDER — HEPARIN SODIUM,PORCINE 10 UNIT/ML
5 VIAL (ML) INTRAVENOUS
Status: CANCELLED | OUTPATIENT
Start: 2023-10-01

## 2023-07-10 RX ORDER — OXYCODONE HYDROCHLORIDE 15 MG/1
15 TABLET ORAL EVERY 4 HOURS PRN
Qty: 22 TABLET | Refills: 0 | Status: SHIPPED | OUTPATIENT
Start: 2023-07-10 | End: 2023-07-14

## 2023-07-10 RX ADMIN — DIPHENHYDRAMINE HYDROCHLORIDE 25 MG: 25 CAPSULE ORAL at 12:11

## 2023-07-10 RX ADMIN — SODIUM CHLORIDE, POTASSIUM CHLORIDE, SODIUM LACTATE AND CALCIUM CHLORIDE 1000 ML: 600; 310; 30; 20 INJECTION, SOLUTION INTRAVENOUS at 12:10

## 2023-07-10 RX ADMIN — HYDROMORPHONE HYDROCHLORIDE 1 MG: 1 INJECTION, SOLUTION INTRAMUSCULAR; INTRAVENOUS; SUBCUTANEOUS at 12:12

## 2023-07-10 RX ADMIN — HYDROMORPHONE HYDROCHLORIDE 1 MG: 1 INJECTION, SOLUTION INTRAMUSCULAR; INTRAVENOUS; SUBCUTANEOUS at 14:14

## 2023-07-10 RX ADMIN — HYDROMORPHONE HYDROCHLORIDE 1 MG: 1 INJECTION, SOLUTION INTRAMUSCULAR; INTRAVENOUS; SUBCUTANEOUS at 13:16

## 2023-07-10 RX ADMIN — Medication 5 ML: at 14:16

## 2023-07-10 ASSESSMENT — PAIN SCALES - GENERAL: PAINLEVEL: WORST PAIN (10)

## 2023-07-10 NOTE — TELEPHONE ENCOUNTER
Oncology Nurse Triage - Sickle Cell Pain Crisis:    Situation: Jennifer  calling about Sickle Cell Pain Crisis, requesting to be added on for IV fluids and pain medicine    Background:     Patient's last infusion was 7/3/23   Last clinic visit date:6/5/23 Patricia Mantilla   Does patient have active treatment plan?  Yes      Assessment of Symptoms:  Onset/Duration of symptoms: 6 day    Is it typical sickle cell pain? Yes   Location: back and legs   Character: Sharp           Intensity: 10/10    Any radiation of pain, numbness, tingling, weakness, warmth, swelling, discoloration of arms or legs?No     Fever?No  (if yes max temperature recorded in last 24 hours):      Chest Pain Present: No     Shortness of breath: No     Other home therapies tried: HEAT/HEATING PAD and WARM BATH     Last home medication taken and when: 6:00am Oxycodone and rest of other AM meds     Any Refills Needed?: Yes     Does patient have transportation & length of time to get to clinic: No Needs transportaion         Recommendations:     Name placed on infusion call list     Was still on phone with Jennifer doing refill when got notification that BMT could take her at 12:30 for IVF/Pain meds.     BMT can take Jennifer at 12:30     Message sent to [facility] to set up appt     Message sent to Social work to arrange transportation.         If you do not hear from the infusion center by 2pm then you will not be able to get in for an infusion today. If symptoms worsen while waiting for call back, and/or you experience fever, chills, SOB, chest pain, cough, n/v, dizziness, numbness, swelling, discoloration of extremities, then seek emergency evaluation in Emergency Department.     Please note, if you are late for your appt, you risk losing your infusion appt as it may delay another patient's infusion who arrived on time.

## 2023-07-10 NOTE — TELEPHONE ENCOUNTER
Narcotic Refill Request    Medication(s) requested:  Oxycodone 15 mg iR tabs   Person Requesting Refill: Jennifer   What pain is the medication treating: Sickle cell pain   How is the medication being taken?:takes 1 tab every 6 hours is trying to do every 6 hours instead of every 4 hours   Does pt have enough for today? Will be out this afternoon   Is pain being adequately controlled on the current regimen?: Most of the time   Experiencing any side effects from medication?: no     Date of most recent appointment:  6/5/23 Patricia Mantilla   Any No Show Visits: no   Next appointment:   7/14/23 Patricia Mantilla   Last fill date and by whom:  7/3/23/ Patricia Mantilla    Reviewed:  No access     Send to provider:  Dr Duncan

## 2023-07-10 NOTE — PROGRESS NOTES
Infusion Nursing Note:  Jennifer Cervantes presents today for add on IVF and pain meds.    Patient seen by provider today: No   present during visit today: Not Applicable.    Note: No labs/no provider visit today. 1L LR bolus given over 2 hours. Given 25mg PO Benadryl. Given 1mg IVP Dilaudid Q1HR for a max of 3 doses. Infusion completed without complication.    Intravenous Access:  Implanted Port.  +BR noted pre and post infusion  De-accessed prior to discharge.    Treatment Conditions:  Not Applicable.      Post Infusion Assessment:  Patient tolerated infusion without incident.       Discharge Plan:   Patient discharged in stable condition accompanied by: self.      Allison Wiggins RN

## 2023-07-10 NOTE — PROGRESS NOTES
Social Work - Transportation  [facility]    Data/Intervention:  Patient Name: Jennifer Cervantes   Goes By: Jennifer    /Age: 2000 (23 year old)    [facility]  Reason for Referral:  support requested for patient transportation needs for today's appointment at 12:30pm.  Assessment:  called [employer] to arrange ride. [employer] arranged  for patient from home with will call ride. Patient will need to call [phone] when ready for return ride home.  Plan: Patient is aware of the transportation plan.  available to assist with any other needs.  CARLOS Chavez,SW  Hematology/Oncology Social Worker  Phone:[phone] Pager: [phone]

## 2023-07-12 ENCOUNTER — DOCUMENTATION ONLY (OUTPATIENT)
Dept: OTHER | Facility: CLINIC | Age: 23
End: 2023-07-12
Payer: COMMERCIAL

## 2023-07-13 RX ORDER — HEPARIN SODIUM (PORCINE) LOCK FLUSH IV SOLN 100 UNIT/ML 100 UNIT/ML
5 SOLUTION INTRAVENOUS
Status: CANCELLED | OUTPATIENT
Start: 2023-07-14

## 2023-07-13 RX ORDER — ALBUTEROL SULFATE 90 UG/1
1-2 AEROSOL, METERED RESPIRATORY (INHALATION)
Status: CANCELLED
Start: 2023-07-14

## 2023-07-13 RX ORDER — HEPARIN SODIUM,PORCINE 10 UNIT/ML
5 VIAL (ML) INTRAVENOUS
Status: CANCELLED | OUTPATIENT
Start: 2023-07-14

## 2023-07-13 RX ORDER — DIPHENHYDRAMINE HYDROCHLORIDE 50 MG/ML
50 INJECTION INTRAMUSCULAR; INTRAVENOUS
Status: CANCELLED
Start: 2023-07-14

## 2023-07-13 RX ORDER — EPINEPHRINE 1 MG/ML
0.3 INJECTION, SOLUTION INTRAMUSCULAR; SUBCUTANEOUS EVERY 5 MIN PRN
Status: CANCELLED | OUTPATIENT
Start: 2023-07-14

## 2023-07-13 RX ORDER — ALBUTEROL SULFATE 0.83 MG/ML
2.5 SOLUTION RESPIRATORY (INHALATION)
Status: CANCELLED | OUTPATIENT
Start: 2023-07-14

## 2023-07-13 RX ORDER — MEPERIDINE HYDROCHLORIDE 25 MG/ML
25 INJECTION INTRAMUSCULAR; INTRAVENOUS; SUBCUTANEOUS EVERY 30 MIN PRN
Status: CANCELLED | OUTPATIENT
Start: 2023-07-14

## 2023-07-13 RX ORDER — METHYLPREDNISOLONE SODIUM SUCCINATE 125 MG/2ML
125 INJECTION, POWDER, LYOPHILIZED, FOR SOLUTION INTRAMUSCULAR; INTRAVENOUS
Status: CANCELLED
Start: 2023-07-14

## 2023-07-13 NOTE — PROGRESS NOTES
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 She had multiple thromboembolic events in 2021 despite adherent anticoagulation use (though warfarin was perpetually low) and there are concerns for chronic thromboembolic disease but did not have pulmonary HTN on a November 2021 cath. She is maintained on chronic PO opioids and twice-weekly infusion visits (since 1/24/22) but has been able to be maintained on this regimen and has stayed out of the ED most of the time with even rarer admissions.     Interval History:  Jennifer is seen for routine follow-up. Denies any major concerns over the past month. She has not had an ED visits or hospitalizations this past month. She continues to manage her pain primarily at home with hydration, rest, heat, ibuprofen and PRN oxycodone. She feels very proud of her ability to reduce her oxycodone use and would like to speed up her taper. She is hopeful when she decreases her oxycodone to approximately 10 tablets that she will be able to continue to have a few tablets on hand to treat severe pain.     Following a recent infusion visit she notes she developed significant lower extremity pain after she had received fluids and pain medication. The pain was so severe that she felt weak getting in to the cab at home. Once she was home she needed to rest frequently but eventually the pain resolved. Overall she feels that her pain has been worse the past few weeks which she largely attributes to increased stress at  "home. She ensures me she feels safe living with her mother and siblings but has had many issues with her relationship with her mother who is also her PCA. Jennifer and her older sister have gotten very close more recently. She values this relationship, especially when things are emotionally labile at home.    Sickle Cell Disease Comprehensive Checklist    Bone Health/Avascular Necrosis Screening/Symptoms (each visit): no new concerns today    Leg Ulcer evaluation (every visit): none    Hypertension (every visit):stable 3/10/23    Last pulmonary evaluation (asthma, AMAN, pulm HTN): 9/28/22    Stroke/silent cerebral infarct Hx (Y/N): Yes TIA ~2014, first event ~age 1 with full stroke and R sided weakness    Last PCP Visit: 3/6/23    Vaccines:  ? PCV13: 5/13/19  ? Pneumovax (PPSV23): 3/04, 10/09, 7/12/19 (next due 7/2024)  ? Menactra: 4/2010, 9/2015 (MCV done 8/16/21)  ? Influenza: 11/17/2022     Audiology (chelation): done 6/2020, normal.. However, on 6/7, \"While there is no significant change in grade on the CTCAE 4.03 Scale, there were hearing changes bilaterally for both standard and extended high frequency audiometry.  Will need to determine if an ENT consult is advised due to the asymmetry in extended high frequency testing.\"     Plan last reviewed with patient: 7/11/22    Patient background: 21 yo F, enjoys movies and kids though there are times where she does not really want to talk to people. Does not have a lot of social support at home.     Sickle Cell Disease History  Primary Hematologist Team: Jose Rafael Duncan  PCP: none  Genotype: SS  Acute Pain Crisis Treatment: (avoiding IV opioids for now, which she has agreed to)    ER   o Oxycodone 15-20 mg x1  o Ketamine 4mg IV x 2--helps with opioid sparing  o Toradol 15 mg IV x1   o Maintenance IV fluids with LR  o Other: Zofran 8 mg IV PRN nausea    Inpatient:  o Home oxycodone/Oxycontin regimen, though home oxycodone dosing could be increased to 20 mg to " start  o PCA plan:   - None for now  o Other Medications: Zofran  o She has had success with ketamine starting at 4mg/h and advancing only to 6mg/h, as 8mg/h made her feel quite poorly..  o ASA  o Supportive Care: Docusate, Senna  Chronic Pain Medications:    Home regimen Oxycontin 10 mg q12h, oxycodone 15 mg p.o. q.4-6 hours p.r.n. breakthrough pain.  She also continues on Voltaren gel, and Zoloft among other medications.    -Also benefits from mental health visits, acupuncture  Baseline Hemoglobin: 7 g/dl without chronic transfusions  Hydroxyurea use: Yes  H/O blood transfusions: Yes, several (iron overload) Most recent 11/20/2021    H/O Transfusion Reactions: no    Antibodies:none  H/O Acute Chest Syndrome: Yes    Last episode:9/05/22 (previously 4/26/21, 10/2019)     ICU/intubation: not with 9/2022 admission  H/O Stroke: Yes (managed with chronic transfusions in the past, stopped late Spring 2020)  H/O VTE: Yes (2/2021)  H/O Cholecystectomy or Splenectomy: no  H/O Asthma, Pulm HTN, AVN, Leg Ulcers, Nephropathy, Retinopathy, etc: Iron overload, asthma, chronic lung disease, physical limitations from early stroke    ---------------------------------------  Jennifer Cervantes's Goals (discussed today, 7/14/23)    1-3 month goal:  Continue to look for a job    6 month goal:  Save money for ultimate goal of moving out    12 month goal:  Move into her own place     Disease-specific goal(s):  Continue to wean oxycodone down  ---------------------------------------      Current Outpatient Medications   Medication Sig Dispense Refill     aspirin (ASA) 81 MG chewable tablet Take 1 tablet (81 mg) by mouth 2 times daily 60 tablet 11     deferasirox (JADENU) 360 MG tablet Take 4 tablets (1,440 mg) by mouth every evening 120 tablet 4     diphenhydrAMINE (BENADRYL) 25 MG capsule Take 1 capsule (25 mg) by mouth every 6 hours as needed for itching or allergies 30 capsule 0     Hydroxyurea 1000 MG TABS Take 3,000 mg by mouth daily 90  tablet 3     [START ON 7/17/2023] oxyCODONE IR (ROXICODONE) 15 MG tablet Take 1 tablet (15 mg) by mouth every 4 hours as needed for severe pain Goal 4 per day. Max 6 per day. 20 tablet 0     acetaminophen (TYLENOL) 325 MG tablet Take 2 tablets (650 mg) by mouth every 6 hours as needed for mild pain 120 tablet 3     albuterol (PROAIR HFA/PROVENTIL HFA/VENTOLIN HFA) 108 (90 Base) MCG/ACT inhaler Inhale 2 puffs into the lungs every 6 hours as needed for shortness of breath or wheezing 8.5 g 3     albuterol (PROVENTIL) (2.5 MG/3ML) 0.083% neb solution Take 2 vials (5 mg) by nebulization every 6 hours as needed for shortness of breath or wheezing 90 mL 3     budesonide-formoterol (SYMBICORT) 160-4.5 MCG/ACT Inhaler Inhale 2 puffs into the lungs 2 times daily 10.2 g 3     cetirizine (ZYRTEC) 10 MG tablet Take 1 tablet (10 mg) by mouth daily 30 tablet 1     EPINEPHrine (ANY BX GENERIC EQUIV) 0.3 MG/0.3ML injection 2-pack Inject 0.3 mLs (0.3 mg) into the muscle as needed for anaphylaxis (Patient not taking: Reported on 3/10/2023) 1 each 1     FLUoxetine (PROZAC) 10 MG capsule Take 1 capsule (10 mg) by mouth daily For two weeks, then increase to 20 mg if 10 mg not effective 40 capsule 1     naloxone (NARCAN) 4 MG/0.1ML nasal spray Spray 4 mg into one nostril alternating nostrils as needed for opioid reversal every 2-3 minutes until assistance arrives (Patient not taking: Reported on 5/18/2023)       ondansetron (ZOFRAN) 8 MG tablet Take 1 tablet (8 mg) by mouth every 8 hours as needed (Patient not taking: Reported on 6/5/2023) 30 tablet 1     traZODone (DESYREL) 50 MG tablet Take 1 tablet (50 mg) by mouth At Bedtime 30 tablet 1       Past Medical History  Past Medical History:   Diagnosis Date     Anxiety      Bleeding disorder (H)      Blood clotting disorder (H)      Cerebral infarction (H) 2015     Cognitive developmental delay     low IQ. Please recognize when managing pain and planning with her     Depressive disorder       Hemiplegia and hemiparesis following cerebral infarction affecting right dominant side (H)     right hand contractures     Iron overload due to repeated red blood cell transfusions      Migraines      Multiple subsegmental pulmonary emboli without acute cor pulmonale (H) 02/01/2021     Oppositional defiant behavior      Presence of intrauterine contraceptive device 2/18/2020     Superficial venous thrombosis of arm, right 03/25/2021     Uncomplicated asthma      Past Surgical History:   Procedure Laterality Date     AS INSERT TUNNELED CV 2 CATH W/O PORT/PUMP       CHOLECYSTECTOMY       CV RIGHT HEART CATH MEASUREMENTS RECORDED N/A 11/18/2021    Procedure: Right Heart Cath;  Surgeon: Jackson Stauffer MD;  Location:  [facility] CATH LAB     INSERT PORT VASCULAR ACCESS Left 4/21/2021    Procedure: INSERTION, VASCULAR ACCESS PORT (NOT SURE ON SIDE UNTIL REMOVAL);  Surgeon: Rajan More MD;  Location: [facility] OR     IR CHEST PORT PLACEMENT > 5 YRS OF AGE  4/21/2021     IR CVC NON TUNNEL LINE REMOVAL  5/6/2021     IR CVC NON TUNNEL PLACEMENT > 5 YRS  04/07/2020     IR CVC NON TUNNEL PLACEMENT > 5 YRS  4/30/2021     IR CVC NON TUNNEL PLACEMENT > 5 YRS  9/7/2022     IR PORT REMOVAL LEFT  4/21/2021     REMOVE PORT VASCULAR ACCESS Left 4/21/2021    Procedure: REMOVAL, VASCULAR ACCESS PORT LEFT;  Surgeon: Rajan More MD;  Location: [facility] OR     REPAIR TENDON ELBOW Right 10/02/2019    Procedure: Right Elbow Flexor Lengthening, Flexor Pronator Slide Of Wrist and Finger, Thumb Adductor Lengthening;  Surgeon: Anai Franco MD;  Location: [facility] OR     TONSILLECTOMY Bilateral 10/02/2019    Procedure: Bilateral Tonsillectomy;  Surgeon: Farhana Guy MD;  Location: [facility] OR     ZZC BREAST REDUCTION (INCLUDES LIPO) TIER 3 Bilateral 04/23/2019     Allergies   Allergen Reactions     Contrast Dye      Hives and breathing issues     Fish-Derived Products Hives     Seafood Hives     Gadolinium      Iodinated  [employer]      Social History   Social History     Tobacco Use     Smoking status: Never     Smokeless tobacco: Never   Substance Use Topics     Alcohol use: Not Currently     Alcohol/week: 0.0 standard drinks of alcohol     Drug use: Never    Still living at home with mom and extended family.     Past medical history and social history were reviewed.    Physical Examination:  /75 (BP Location: Left arm, Patient Position: Sitting, Cuff Size: Adult Regular)   Pulse 101   Temp 98.3  F (36.8  C) (Oral)   Resp 16   Wt 65.2 kg (143 lb 12.8 oz)   LMP  (LMP Unknown)   SpO2 95%   BMI 24.68 kg/m       Wt Readings from Last 10 Encounters:   07/14/23 65.2 kg (143 lb 12.8 oz)   07/10/23 65.3 kg (144 lb)   06/16/23 67 kg (147 lb 12.8 oz)   06/05/23 67.9 kg (149 lb 11.2 oz)   05/19/23 69.3 kg (152 lb 12.8 oz)   05/13/23 72.2 kg (159 lb 3.2 oz)   05/03/23 74.8 kg (164 lb 14.5 oz)   04/30/23 74.8 kg (165 lb)   04/14/23 69.4 kg (153 lb 1.6 oz)   03/27/23 70.8 kg (156 lb)     General: Pleasant female, NAD  Eyes: EOMI, PERRL. Mild. scleral icterus.  Respiratory: Normal respiratory effort.  Ext: No peripheral edema.  MSK: Contractured right arm and hand 2/2 stoke.  Neurologic: Grossly nonfocal. A/O x 4.  Skin: No rashes, petechiae, or bruising noted on exposed skin.    Laboratory Data:  Most Recent 3 CBC's:  Recent Labs   Lab Test 07/14/23  0948 06/16/23  1024 06/05/23  0910   WBC 14.4* 13.0* 11.1*   HGB 7.3* 7.9* 7.5*   MCV 83 82 81   * 521* 484*   ANEUTAUTO 10.5* 10.1* 7.8    Most Recent 3 BMP's:  Recent Labs   Lab Test 07/14/23  0948 06/16/23  1024 06/05/23  0910 05/17/23  1048 05/13/23  0605 05/12/23  0731 05/11/23  0648 05/10/23  1948    140 136   < > 141 141 139 135*   POTASSIUM 3.8 4.0 3.7   < > 4.2 4.3 4.3 3.7   CHLORIDE 105 108* 106   < > 112* 111* 109* 104   CO2 22 21* 22   < > 20* 20* 19* 19*   BUN 8.6 8.7 5.7*   < > 10.3 8.7 6.3 5.5*   CR 0.62 0.59 0.53   < > 0.62 0.67 0.53 0.58   ANIONGAP 10  11 8   < > 9 10 11 12   MICAH 9.1 9.4 9.1   < > 8.1* 8.3* 8.9 8.9   GLC 95 88 93   < > 96 100* 137* 78   PROTTOTAL  --   --  7.6  --   --   --  7.2 7.4   ALBUMIN  --   --  4.5  --  3.8 4.0 4.2 4.4    < > = values in this interval not displayed.    Most Recent 2 LFT's:  Recent Labs   Lab Test 06/05/23  0910 05/14/23  0633 05/12/23  0731 05/11/23  0648   AST 29  --   --  27   ALT 18  --   --  12   ALKPHOS 67  --   --  71   BILITOTAL 2.2* 1.5*   < > 1.8*    < > = values in this interval not displayed.      Lab Results   Component Value Date    RETP 21.0 (H) 07/14/2023      Assessment and Plan:  1. Sickle Cell HgbSS Disease  2. Recent hospitalization with acute chest, pulmonary edema (1/2023)  3. Chronic Pain  4. 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 History of CVA  7. Hearing loss    Gil is seen today in the infusion center for routine hematology follow-up.  Overall she is doing well and continues to taper off of oxycodone slowly.  She is hopeful to increase the rate of the taper and reduce by 5 tablets every month.  We will now decrease to 20 tablets per refill which she continues to feel somewhat weekly.  Next month she would like to go to 15 tablets and then 10 tablets per fill in September.  I reiterated that we, her hematology team, are very proud of her efforts to continue to reduce her oxycodone use.  I am very happy to hear she is proud of herself as well.  She continues to utilize the infusion center routinely although remains limited to 2 visits per week.  Due to clinic capacity she has had weeks where she has not been able to get up twice and has still been able to manage her pain adequately at home.    She continues to pursue work although has not had great luck in finding a job.  I think this would be very helpful for multiple reasons but particularly potential to create some distance from her family members as this seems to create significant stress  at times.    Labs today reveal slightly lower hemoglobin at 7.3 and leukocytosis with WBC 14.4.  She denies any infectious concerns.  She has not had issues with asthma exacerbations since her last hospital visit.    Desferal is currently on hold due to desire to stop/hold infusion due to decreased quality of life related to the amount of time she needed to be connected for infusion. Remains on Jadenu which was refilled today.  A repeat Ferriscan has still has not been scheduled and I will discuss this directly with our scheduling team today.      Plan:  -Continue Hydrea to 3000mg daily to help lessen frequency of sickle cell pain. Refilled today.  -She has resume crizanlizumab infusions which we will continue monthly  -Continue slow taper of oxycodone. Will decrease to 20 tablets per fill from 22. She can continue the frequency at every 4 hours with a max of 6 tablets per day but with the decreased tablets per refill should aim for a max of 4 tablets per day. She can self-reduce infusion days/week and we will continue to keep the cap at 2/week for now.  -Continue infusion center visits limited to two times per week (Mondays and Fridays ideally although still needs to call to request). Continue diligent home management with current medications, heat, rest, compression, warm baths.   -If unable to manage at home can go to ED but continue to not do IV narcotics in the emergency room. Ketamine previously added to pain plan in ED  - Desferal infusions on hold. Continue Jadenu and check Ferriscan. Will likely need to resume infusions in the future.   -Continue aspirin BID  -RTC with me in 1 month, coordinate with [facility] infusions    75 minutes spent on the date of the encounter doing chart review, review of test results, interpretation of tests, patient visit and documentation     Patricia Mantilla, CNP

## 2023-07-14 ENCOUNTER — ONCOLOGY VISIT (OUTPATIENT)
Dept: ONCOLOGY | Facility: CLINIC | Age: 23
End: 2023-07-14
Attending: REGISTERED NURSE
Payer: COMMERCIAL

## 2023-07-14 ENCOUNTER — NURSE TRIAGE (OUTPATIENT)
Dept: ONCOLOGY | Facility: CLINIC | Age: 23
End: 2023-07-14
Payer: COMMERCIAL

## 2023-07-14 ENCOUNTER — INFUSION THERAPY VISIT (OUTPATIENT)
Dept: ONCOLOGY | Facility: CLINIC | Age: 23
End: 2023-07-14
Payer: COMMERCIAL

## 2023-07-14 ENCOUNTER — APPOINTMENT (OUTPATIENT)
Dept: LAB | Facility: CLINIC | Age: 23
End: 2023-07-14
Payer: COMMERCIAL

## 2023-07-14 VITALS
RESPIRATION RATE: 16 BRPM | TEMPERATURE: 98.3 F | WEIGHT: 143.8 LBS | HEART RATE: 101 BPM | SYSTOLIC BLOOD PRESSURE: 123 MMHG | BODY MASS INDEX: 24.68 KG/M2 | OXYGEN SATURATION: 95 % | DIASTOLIC BLOOD PRESSURE: 75 MMHG

## 2023-07-14 DIAGNOSIS — D57.1 HB-SS DISEASE WITHOUT CRISIS (H): Primary | ICD-10-CM

## 2023-07-14 DIAGNOSIS — E83.111 IRON OVERLOAD DUE TO REPEATED RED BLOOD CELL TRANSFUSIONS: ICD-10-CM

## 2023-07-14 DIAGNOSIS — D57.00 SICKLE CELL PAIN CRISIS (H): Primary | ICD-10-CM

## 2023-07-14 DIAGNOSIS — I82.611 SUPERFICIAL VENOUS THROMBOSIS OF ARM, RIGHT: ICD-10-CM

## 2023-07-14 DIAGNOSIS — G81.10 SPASTIC HEMIPLEGIA, UNSPECIFIED ETIOLOGY, UNSPECIFIED LATERALITY (H): ICD-10-CM

## 2023-07-14 DIAGNOSIS — D57.00 SICKLE CELL PAIN CRISIS (H): ICD-10-CM

## 2023-07-14 DIAGNOSIS — J30.2 SEASONAL ALLERGIES: ICD-10-CM

## 2023-07-14 LAB
ANION GAP SERPL CALCULATED.3IONS-SCNC: 10 MMOL/L (ref 7–15)
BASOPHILS # BLD AUTO: 0.2 10E3/UL (ref 0–0.2)
BASOPHILS NFR BLD AUTO: 2 %
BUN SERPL-MCNC: 8.6 MG/DL (ref 6–20)
CALCIUM SERPL-MCNC: 9.1 MG/DL (ref 8.6–10)
CHLORIDE SERPL-SCNC: 105 MMOL/L (ref 98–107)
CREAT SERPL-MCNC: 0.62 MG/DL (ref 0.51–0.95)
DEPRECATED HCO3 PLAS-SCNC: 22 MMOL/L (ref 22–29)
EOSINOPHIL # BLD AUTO: 0.4 10E3/UL (ref 0–0.7)
EOSINOPHIL NFR BLD AUTO: 3 %
ERYTHROCYTE [DISTWIDTH] IN BLOOD BY AUTOMATED COUNT: 23.6 % (ref 10–15)
GFR SERPL CREATININE-BSD FRML MDRD: >90 ML/MIN/1.73M2
GLUCOSE SERPL-MCNC: 95 MG/DL (ref 70–99)
HCT VFR BLD AUTO: 20.6 % (ref 35–47)
HGB BLD-MCNC: 7.3 G/DL (ref 11.7–15.7)
IMM GRANULOCYTES # BLD: 0.1 10E3/UL
IMM GRANULOCYTES NFR BLD: 1 %
LYMPHOCYTES # BLD AUTO: 2.3 10E3/UL (ref 0.8–5.3)
LYMPHOCYTES NFR BLD AUTO: 16 %
MCH RBC QN AUTO: 29.3 PG (ref 26.5–33)
MCHC RBC AUTO-ENTMCNC: 35.4 G/DL (ref 31.5–36.5)
MCV RBC AUTO: 83 FL (ref 78–100)
MONOCYTES # BLD AUTO: 0.9 10E3/UL (ref 0–1.3)
MONOCYTES NFR BLD AUTO: 7 %
NEUTROPHILS # BLD AUTO: 10.5 10E3/UL (ref 1.6–8.3)
NEUTROPHILS NFR BLD AUTO: 71 %
NRBC # BLD AUTO: 0.2 10E3/UL
NRBC BLD AUTO-RTO: 1 /100
PLATELET # BLD AUTO: 517 10E3/UL (ref 150–450)
POTASSIUM SERPL-SCNC: 3.8 MMOL/L (ref 3.4–5.3)
RBC # BLD AUTO: 2.49 10E6/UL (ref 3.8–5.2)
RETICS # AUTO: 0.54 10E6/UL (ref 0.03–0.1)
RETICS/RBC NFR AUTO: 21 % (ref 0.5–2)
SODIUM SERPL-SCNC: 137 MMOL/L (ref 136–145)
WBC # BLD AUTO: 14.4 10E3/UL (ref 4–11)

## 2023-07-14 PROCEDURE — 250N000011 HC RX IP 250 OP 636: Mod: JZ | Performed by: REGISTERED NURSE

## 2023-07-14 PROCEDURE — 250N000013 HC RX MED GY IP 250 OP 250 PS 637: Performed by: PEDIATRICS

## 2023-07-14 PROCEDURE — G0463 HOSPITAL OUTPT CLINIC VISIT: HCPCS | Performed by: REGISTERED NURSE

## 2023-07-14 PROCEDURE — 82310 ASSAY OF CALCIUM: CPT | Performed by: PEDIATRICS

## 2023-07-14 PROCEDURE — 96376 TX/PRO/DX INJ SAME DRUG ADON: CPT

## 2023-07-14 PROCEDURE — 96413 CHEMO IV INFUSION 1 HR: CPT

## 2023-07-14 PROCEDURE — 96365 THER/PROPH/DIAG IV INF INIT: CPT

## 2023-07-14 PROCEDURE — 99417 PROLNG OP E/M EACH 15 MIN: CPT | Performed by: REGISTERED NURSE

## 2023-07-14 PROCEDURE — 85045 AUTOMATED RETICULOCYTE COUNT: CPT | Performed by: PEDIATRICS

## 2023-07-14 PROCEDURE — 99215 OFFICE O/P EST HI 40 MIN: CPT | Performed by: REGISTERED NURSE

## 2023-07-14 PROCEDURE — 85025 COMPLETE CBC W/AUTO DIFF WBC: CPT | Performed by: PEDIATRICS

## 2023-07-14 PROCEDURE — 96375 TX/PRO/DX INJ NEW DRUG ADDON: CPT

## 2023-07-14 PROCEDURE — 250N000011 HC RX IP 250 OP 636: Performed by: PEDIATRICS

## 2023-07-14 PROCEDURE — 258N000003 HC RX IP 258 OP 636: Performed by: PEDIATRICS

## 2023-07-14 PROCEDURE — 96361 HYDRATE IV INFUSION ADD-ON: CPT

## 2023-07-14 RX ORDER — HEPARIN SODIUM,PORCINE 10 UNIT/ML
5 VIAL (ML) INTRAVENOUS
Status: CANCELLED | OUTPATIENT
Start: 2023-10-01

## 2023-07-14 RX ORDER — HEPARIN SODIUM (PORCINE) LOCK FLUSH IV SOLN 100 UNIT/ML 100 UNIT/ML
5 SOLUTION INTRAVENOUS
Status: DISCONTINUED | OUTPATIENT
Start: 2023-07-14 | End: 2023-07-14 | Stop reason: HOSPADM

## 2023-07-14 RX ORDER — ASPIRIN 81 MG/1
81 TABLET, CHEWABLE ORAL 2 TIMES DAILY
Qty: 60 TABLET | Refills: 11 | Status: SHIPPED | OUTPATIENT
Start: 2023-07-14 | End: 2023-09-08

## 2023-07-14 RX ORDER — DIPHENHYDRAMINE HCL 25 MG
25 CAPSULE ORAL
Status: COMPLETED | OUTPATIENT
Start: 2023-07-14 | End: 2023-07-14

## 2023-07-14 RX ORDER — ONDANSETRON 4 MG/1
8 TABLET, ORALLY DISINTEGRATING ORAL
Status: COMPLETED | OUTPATIENT
Start: 2023-07-14 | End: 2023-07-14

## 2023-07-14 RX ORDER — ONDANSETRON 4 MG/1
8 TABLET, FILM COATED ORAL
Status: CANCELLED
Start: 2023-10-01

## 2023-07-14 RX ORDER — HEPARIN SODIUM (PORCINE) LOCK FLUSH IV SOLN 100 UNIT/ML 100 UNIT/ML
5 SOLUTION INTRAVENOUS
Status: CANCELLED | OUTPATIENT
Start: 2023-10-01

## 2023-07-14 RX ORDER — DEFERASIROX 360 MG/1
1440 TABLET, FILM COATED ORAL EVERY EVENING
Qty: 120 TABLET | Refills: 4 | Status: SHIPPED | OUTPATIENT
Start: 2023-07-14 | End: 2023-09-08

## 2023-07-14 RX ORDER — DIPHENHYDRAMINE HCL 25 MG
25 CAPSULE ORAL EVERY 6 HOURS PRN
Qty: 30 CAPSULE | Refills: 0 | Status: SHIPPED | OUTPATIENT
Start: 2023-07-14 | End: 2024-05-16

## 2023-07-14 RX ORDER — OXYCODONE HYDROCHLORIDE 15 MG/1
15 TABLET ORAL EVERY 4 HOURS PRN
Qty: 20 TABLET | Refills: 0 | Status: SHIPPED | OUTPATIENT
Start: 2023-07-17 | End: 2023-07-17

## 2023-07-14 RX ORDER — DIPHENHYDRAMINE HCL 25 MG
25 CAPSULE ORAL
Status: CANCELLED
Start: 2023-10-01

## 2023-07-14 RX ADMIN — Medication 5 ML: at 13:35

## 2023-07-14 RX ADMIN — HYDROMORPHONE HYDROCHLORIDE 1 MG: 1 INJECTION, SOLUTION INTRAMUSCULAR; INTRAVENOUS; SUBCUTANEOUS at 12:37

## 2023-07-14 RX ADMIN — SODIUM CHLORIDE 250 ML: 9 INJECTION, SOLUTION INTRAVENOUS at 11:37

## 2023-07-14 RX ADMIN — DIPHENHYDRAMINE HYDROCHLORIDE 25 MG: 25 CAPSULE ORAL at 11:31

## 2023-07-14 RX ADMIN — Medication 5 ML: at 09:48

## 2023-07-14 RX ADMIN — SODIUM CHLORIDE 326 MG: 9 INJECTION, SOLUTION INTRAVENOUS at 11:37

## 2023-07-14 RX ADMIN — HYDROMORPHONE HYDROCHLORIDE 1 MG: 1 INJECTION, SOLUTION INTRAMUSCULAR; INTRAVENOUS; SUBCUTANEOUS at 10:29

## 2023-07-14 RX ADMIN — HYDROMORPHONE HYDROCHLORIDE 1 MG: 1 INJECTION, SOLUTION INTRAMUSCULAR; INTRAVENOUS; SUBCUTANEOUS at 11:30

## 2023-07-14 RX ADMIN — SODIUM CHLORIDE, POTASSIUM CHLORIDE, SODIUM LACTATE AND CALCIUM CHLORIDE 1000 ML: 600; 310; 30; 20 INJECTION, SOLUTION INTRAVENOUS at 10:24

## 2023-07-14 RX ADMIN — ONDANSETRON 8 MG: 4 TABLET, ORALLY DISINTEGRATING ORAL at 11:31

## 2023-07-14 ASSESSMENT — PAIN SCALES - GENERAL: PAINLEVEL: EXTREME PAIN (8)

## 2023-07-14 NOTE — NURSING NOTE
Chief Complaint   Patient presents with     Port Draw     Vitals taken, port accessed, labs drawn, heparin locked, checked into next appt     /75 (BP Location: Left arm, Patient Position: Sitting, Cuff Size: Adult Regular)   Pulse 101   Temp 98.3  F (36.8  C) (Oral)   Resp 16   Wt 65.2 kg (143 lb 12.8 oz)   LMP  (LMP Unknown)   SpO2 95%   BMI 24.68 kg/m    Aravind Feldman RN on 7/14/2023 at 9:55 AM

## 2023-07-14 NOTE — TELEPHONE ENCOUNTER
Oncology Nurse Triage - Sickle Cell Pain Crisis:  Situation: Jennifer  calling about Sickle Cell Pain Crisis, requesting to be added on for IV fluids and pain medicine    Background:   Patient's last infusion was 7/10/23  Last clinic visit date:6/5/23, is also scheduled to see Patricia today  Does patient have active treatment plan?  Yes    Assessment of Symptoms:  Onset/Duration of symptoms: 1 day    Is it typical sickle cell pain? Yes   Location: lower back  Character: Sharp           Intensity: 8/10    Any radiation of pain, numbness, tingling, weakness, warmth, swelling, discoloration of arms or legs?No     Fever?No    Chest Pain Present: No     Shortness of breath: No     Other home therapies tried: HEAT/HEATING PAD     Last home medication taken and when: 0600    Any Refills Needed?: No     Does patient have transportation & length of time to get to clinic: No     Recommendations:   Secure Epic chat sent to Patricia Khalilmaria eugenia at 0705; messaged infusion at 0708  0723 approved by Patricia to add on IVF/pain meds to Jr infusion. Updated charge nurse, Melissa Oliver, to add on to appt. Pt updated.     If you do not hear from the infusion center by 2pm then you will not be able to get in for an infusion today. If symptoms worsen while waiting for call back, and/or you experience fever, chills, SOB, chest pain, cough, n/v, dizziness, numbness, swelling, discoloration of extremities, then seek emergency evaluation in Emergency Department.     Please note, if you are late for your appt, you risk losing your infusion appt as it may delay another patient's infusion who arrived on time.

## 2023-07-14 NOTE — PROGRESS NOTES
Infusion Nursing Note:  Jennifer Cervantes presents today for Crizanlizumab-tmca, IVF and pain meds.    Patient seen by provider today: Yes: Patricia Scott CNP   present during visit today: Not Applicable.    Note: Pt reporting pain 9/10 in her lower back upon arrival to infusion suite. After 3 doses of IV Dilaudid, pain decreased to 3/10, which pt states is a tolerable level to manage at home.     Intravenous Access:  Implanted Port.    Treatment Conditions:  Lab Results   Component Value Date    HGB 7.3 (L) 07/14/2023    WBC 14.4 (H) 07/14/2023    ANEU 6.7 05/12/2023    ANEUTAUTO 10.5 (H) 07/14/2023     (H) 07/14/2023      Lab Results   Component Value Date     07/14/2023    POTASSIUM 3.8 07/14/2023    MAG 2.0 11/11/2021    CR 0.62 07/14/2023    MICAH 9.1 07/14/2023    BILITOTAL 2.2 (H) 06/05/2023    ALBUMIN 4.5 06/05/2023    ALT 18 06/05/2023    AST 29 06/05/2023       Post Infusion Assessment:  Patient tolerated infusion without incident.  Blood return noted pre and post infusion.  Site patent and intact, free from redness, edema or discomfort.  No evidence of extravasations.  Access discontinued per protocol.       Discharge Plan:   Patient declined prescription refills.  AVS to patient via [employer].  Patient will return 8/11/23 for next appointment.   Patient discharged in stable condition accompanied by: self.  Departure Mode: Ambulatory.      Farhana Dias RN

## 2023-07-14 NOTE — Clinical Note
"    7/14/2023         RE: Jennifer Cervantes  [address]  [facility] [address]        Dear Colleague,    Thank you for referring your patient, Jennifer Cervantes, to the [facility]. Please see a copy of my visit note below.    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 She had multiple thromboembolic events in 2021 despite adherent anticoagulation use (though warfarin was perpetually low) and there are concerns for chronic thromboembolic disease but did not have pulmonary HTN on a November 2021 cath. She is maintained on chronic PO opioids and twice-weekly infusion visits (since 1/24/22) but has been able to be maintained on this regimen and has stayed out of the ED most of the time with even rarer admissions.     Interval History:  ***    Sickle Cell Disease Comprehensive Checklist    Bone Health/Avascular Necrosis Screening/Symptoms (each visit): no new concerns today    Leg Ulcer evaluation (every visit): none    Hypertension (every visit):stable 3/10/23    Last pulmonary evaluation (asthma, AMAN, pulm HTN): 9/28/22    Stroke/silent cerebral infarct Hx (Y/N): Yes TIA ~2014, first event ~age 1 with full stroke and R sided weakness    Last PCP Visit: 3/6/23    Vaccines:  ? PCV13: 5/13/19  ? Pneumovax (PPSV23): 3/04, 10/09, 7/12/19 (next due 7/2024)  ? Menactra: 4/2010, 9/2015 (MCV done 8/16/21)  ? Influenza: 11/17/2022     Audiology (chelation): done 6/2020, normal.. However, on 6/7, \"While there is no " "significant change in grade on the CTCAE 4.03 Scale, there were hearing changes bilaterally for both standard and extended high frequency audiometry.  Will need to determine if an ENT consult is advised due to the asymmetry in extended high frequency testing.\"     Plan last reviewed with patient: 7/11/22    Patient background: 23 yo F, enjoys movies and kids though there are times where she does not really want to talk to people. Does not have a lot of social support at home.     Sickle Cell Disease History  Primary Hematologist Team: Jose Rafael Duncan  PCP: none  Genotype: SS  Acute Pain Crisis Treatment: (avoiding IV opioids for now, which she has agreed to)    ER   o Oxycodone 15-20 mg x1  o Ketamine 4mg IV x 2--helps with opioid sparing  o Toradol 15 mg IV x1   o Maintenance IV fluids with LR  o Other: Zofran 8 mg IV PRN nausea    Inpatient:  o Home oxycodone/Oxycontin regimen, though home oxycodone dosing could be increased to 20 mg to start  o PCA plan:   - None for now  o Other Medications: Zofran  o She has had success with ketamine starting at 4mg/h and advancing only to 6mg/h, as 8mg/h made her feel quite poorly..  o ASA  o Supportive Care: Docusate, Senna  Chronic Pain Medications:    Home regimen Oxycontin 10 mg q12h, oxycodone 15 mg p.o. q.4-6 hours p.r.n. breakthrough pain.  She also continues on Voltaren gel, and Zoloft among other medications.    -Also benefits from mental health visits, acupuncture  Baseline Hemoglobin: 7 g/dl without chronic transfusions  Hydroxyurea use: Yes  H/O blood transfusions: Yes, several (iron overload) Most recent 11/20/2021    H/O Transfusion Reactions: no    Antibodies:none  H/O Acute Chest Syndrome: Yes    Last episode:9/05/22 (previously 4/26/21, 10/2019)     ICU/intubation: not with 9/2022 admission  H/O Stroke: Yes (managed with chronic transfusions in the past, stopped late Spring 2020)  H/O VTE: Yes (2/2021)  H/O Cholecystectomy or Splenectomy: no  H/O Asthma, Pulm " HTN, AVN, Leg Ulcers, Nephropathy, Retinopathy, etc: Iron overload, asthma, chronic lung disease, physical limitations from early stroke    ---------------------------------------  Jennifer Cervantes's Goals (discussed today, 6/5/23)    1-3 month goal:  Continue to look for a job    6 month goal:  Save money for ultimate goal of moving out    12 month goal:  Move into her own place     Disease-specific goal(s):  Continue to wean oxycodone down  ---------------------------------------      Current Outpatient Medications   Medication Sig Dispense Refill     acetaminophen (TYLENOL) 325 MG tablet Take 2 tablets (650 mg) by mouth every 6 hours as needed for mild pain 120 tablet 3     albuterol (PROAIR HFA/PROVENTIL HFA/VENTOLIN HFA) 108 (90 Base) MCG/ACT inhaler Inhale 2 puffs into the lungs every 6 hours as needed for shortness of breath or wheezing 8.5 g 3     albuterol (PROVENTIL) (2.5 MG/3ML) 0.083% neb solution Take 2 vials (5 mg) by nebulization every 6 hours as needed for shortness of breath or wheezing 90 mL 3     aspirin (ASA) 81 MG chewable tablet Take 1 tablet (81 mg) by mouth 2 times daily 60 tablet 11     budesonide-formoterol (SYMBICORT) 160-4.5 MCG/ACT Inhaler Inhale 2 puffs into the lungs 2 times daily 10.2 g 3     cetirizine (ZYRTEC) 10 MG tablet Take 1 tablet (10 mg) by mouth daily 30 tablet 1     deferasirox (JADENU) 360 MG tablet Take 4 tablets (1,440 mg) by mouth every evening 120 tablet 4     diphenhydrAMINE (BENADRYL) 25 MG capsule Take 1 capsule (25 mg) by mouth every 6 hours as needed for itching or allergies 30 capsule 0     EPINEPHrine (ANY BX GENERIC EQUIV) 0.3 MG/0.3ML injection 2-pack Inject 0.3 mLs (0.3 mg) into the muscle as needed for anaphylaxis (Patient not taking: Reported on 3/10/2023) 1 each 1     FLUoxetine (PROZAC) 10 MG capsule Take 1 capsule (10 mg) by mouth daily For two weeks, then increase to 20 mg if 10 mg not effective 40 capsule 1     Hydroxyurea 1000 MG TABS Take 3,000 mg by  mouth daily 90 tablet 3     naloxone (NARCAN) 4 MG/0.1ML nasal spray Spray 4 mg into one nostril alternating nostrils as needed for opioid reversal every 2-3 minutes until assistance arrives (Patient not taking: Reported on 5/18/2023)       ondansetron (ZOFRAN) 8 MG tablet Take 1 tablet (8 mg) by mouth every 8 hours as needed (Patient not taking: Reported on 6/5/2023) 30 tablet 1     oxyCODONE IR (ROXICODONE) 15 MG tablet Take 1 tablet (15 mg) by mouth every 4 hours as needed for severe pain Goal 4 per day. Max 6 per day. 22 tablet 0     traZODone (DESYREL) 50 MG tablet Take 1 tablet (50 mg) by mouth At Bedtime 30 tablet 1       Past Medical History  Past Medical History:   Diagnosis Date     Anxiety      Bleeding disorder (H)      Blood clotting disorder (H)      Cerebral infarction (H) 2015     Cognitive developmental delay     low IQ. Please recognize when managing pain and planning with her     Depressive disorder      Hemiplegia and hemiparesis following cerebral infarction affecting right dominant side (H)     right hand contractures     Iron overload due to repeated red blood cell transfusions      Migraines      Multiple subsegmental pulmonary emboli without acute cor pulmonale (H) 02/01/2021     Oppositional defiant behavior      Presence of intrauterine contraceptive device 2/18/2020     Superficial venous thrombosis of arm, right 03/25/2021     Uncomplicated asthma      Past Surgical History:   Procedure Laterality Date     AS INSERT TUNNELED CV 2 CATH W/O PORT/PUMP       CHOLECYSTECTOMY       CV RIGHT HEART CATH MEASUREMENTS RECORDED N/A 11/18/2021    Procedure: Right Heart Cath;  Surgeon: Jackson Stauffer MD;  Location:  HEART CARDIAC CATH LAB     INSERT PORT VASCULAR ACCESS Left 4/21/2021    Procedure: INSERTION, VASCULAR ACCESS PORT (NOT SURE ON SIDE UNTIL REMOVAL);  Surgeon: Rajan More MD;  Location: [facility] OR     IR CHEST PORT PLACEMENT > 5 YRS OF AGE  4/21/2021     IR CVC NON TUNNEL LINE  REMOVAL  5/6/2021     IR CVC NON TUNNEL PLACEMENT > 5 YRS  04/07/2020     IR CVC NON TUNNEL PLACEMENT > 5 YRS  4/30/2021     IR CVC NON TUNNEL PLACEMENT > 5 YRS  9/7/2022     IR PORT REMOVAL LEFT  4/21/2021     REMOVE PORT VASCULAR ACCESS Left 4/21/2021    Procedure: REMOVAL, VASCULAR ACCESS PORT LEFT;  Surgeon: Rajan More MD;  Location: [facility] OR     REPAIR TENDON ELBOW Right 10/02/2019    Procedure: Right Elbow Flexor Lengthening, Flexor Pronator Slide Of Wrist and Finger, Thumb Adductor Lengthening;  Surgeon: Anai Franco MD;  Location: [facility]     TONSILLECTOMY Bilateral 10/02/2019    Procedure: Bilateral Tonsillectomy;  Surgeon: Farhana Guy MD;  Location: [facility] OR     ZZC BREAST REDUCTION (INCLUDES LIPO) TIER 3 Bilateral 04/23/2019     Allergies   Allergen Reactions     Contrast Dye      Hives and breathing issues     Fish-Derived Products Hives     Seafood Hives     Gadolinium      Iodinated Contrast Media      Social History   Social History     Tobacco Use     Smoking status: Never     Smokeless tobacco: Never   Substance Use Topics     Alcohol use: Not Currently     Alcohol/week: 0.0 standard drinks of alcohol     Drug use: Never    Still living at home with mom and extended family.     Past medical history and social history were reviewed.    Physical Examination:  LMP  (LMP Unknown)   Wt Readings from Last 10 Encounters:   07/10/23 65.3 kg (144 lb)   06/16/23 67 kg (147 lb 12.8 oz)   06/05/23 67.9 kg (149 lb 11.2 oz)   05/19/23 69.3 kg (152 lb 12.8 oz)   05/13/23 72.2 kg (159 lb 3.2 oz)   05/03/23 74.8 kg (164 lb 14.5 oz)   04/30/23 74.8 kg (165 lb)   04/14/23 69.4 kg (153 lb 1.6 oz)   03/27/23 70.8 kg (156 lb)   03/27/23 69.8 kg (153 lb 14.4 oz)     General: Pleasant female, NAD  Eyes: EOMI, PERRL. Mild. scleral icterus.  Respiratory: CTA bilaterally. No wheezing  Cardiac: RRR, no m/g/r. No peripheral edema.  MSK: Contractured right arm and hand 2/2 stroke.  Neurologic: Grossly  nonfocal. A/O x 4.  Skin: No rashes, petechiae, or bruising noted on exposed skin.    Laboratory Data:  Most Recent 3 CBC's:  Recent Labs   Lab Test 06/16/23  1024 06/05/23  0910 05/17/23  1048   WBC 13.0* 11.1* 13.5*   HGB 7.9* 7.5* 6.7*   MCV 82 81 77*   * 484* 536*   ANEUTAUTO 10.1* 7.8 9.6*    Most Recent 3 BMP's:  Recent Labs   Lab Test 06/16/23  1024 06/05/23  0910 05/17/23  1048 05/13/23  0605 05/12/23  0731 05/11/23  0648 05/10/23  1948    136 139 141 141 139 135*   POTASSIUM 4.0 3.7 4.0 4.2 4.3 4.3 3.7   CHLORIDE 108* 106 110* 112* 111* 109* 104   CO2 21* 22 19* 20* 20* 19* 19*   BUN 8.7 5.7* 6.5 10.3 8.7 6.3 5.5*   CR 0.59 0.53 0.51 0.62 0.67 0.53 0.58   ANIONGAP 11 8 10 9 10 11 12   MICAH 9.4 9.1 8.9 8.1* 8.3* 8.9 8.9   GLC 88 93 93 96 100* 137* 78   PROTTOTAL  --  7.6  --   --   --  7.2 7.4   ALBUMIN  --  4.5  --  3.8 4.0 4.2 4.4    Most Recent 2 LFT's:  Recent Labs   Lab Test 06/05/23  0910 05/14/23  0633 05/12/23  0731 05/11/23  0648   AST 29  --   --  27   ALT 18  --   --  12   ALKPHOS 67  --   --  71   BILITOTAL 2.2* 1.5*   < > 1.8*    < > = values in this interval not displayed.      Lab Results   Component Value Date    RETP 17.3 (H) 06/16/2023      Assessment and Plan:  1. Sickle Cell HgbSS Disease  2. Recent hospitalization with acute chest, pulmonary edema (1/2023)  3. Chronic Pain  4. 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 History of CVA  7. Hearing loss    Jennifer is doing well following most recent admission for hypoxia and asthma exacerbation. V/Q scan at that time was negative for PE. Historically PE evaluation has been completed with V/Q due to concern for contrast allergy. I am very pleased with her continued taper of oxycodone and reported use at home. She is going many days without taking medication when she is experiencing no pain. We did not adjust her prescription last month when she was acutely ill but will decreased  further today, reducing her total pills dispensed to 22 from 25.     Labs today are stable with hemoglobin 7.6 and WBC slightly elevated at 11.1. She continues to adhere to twice weekly infusion visits and will receive IVF and pain medication today in infusion. Crizanlizumab infusions have been resume which she will continue on Fridays.     Desferal is currently on hold due to desire to stop/hold infusion due to decreased quality of life related to the amount of time she needed to be connected for infusion. Remains on Jadenu. A repeat Ferriscan was previously requested but has not yet been scheduled. I will request this again today.     We discussed a trial of cetirizine for seasonal allergies rather than treating symptoms with Benadryl which she is agreeable to.    Plan:  -Continue Hydrea to 3000mg daily to help lessen frequency of sickle cell pain. Refilled today.  -Continue slow taper of oxycodone. Will decrease to 22 tablets per fill from 25. She can continue the frequency at every 4 hours with a max of 6 tablets per day but with the decreased tablets per refill should aim for a max of 4 tablets per day. She can self-reduce infusion days/week and we will continue to keep the cap at 2/week for now.  -Continue infusion center visits limited to two times per week (Mondays and Fridays ideally although still needs to call to request). Continue diligent home management with current medications, heat, rest, compression, warm baths.   -Begin cetirizine 10 mg daily for seasonal allergies.  -If unable to manage at home can go to ED but continue to not do IV narcotics in the emergency room. Ketamine previously added to pain plan in ED  - Desferal infusions on hold. Continue Jadenu and check Ferriscan. Will likely need to resume infusions in the future.   -Continue aspirin BID  -RTC with me  7/14 (coordinate with [facility] infusion) and 8/11. Follow-up with Dr. Duncan in 3 months.    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 She had multiple thromboembolic events in 2021 despite adherent anticoagulation use (though warfarin was perpetually low) and there are concerns for chronic thromboembolic disease but did not have pulmonary HTN on a November 2021 cath. She is maintained on chronic PO opioids and twice-weekly infusion visits (since 1/24/22) but has been able to be maintained on this regimen and has stayed out of the ED most of the time with even rarer admissions.     Interval History:  Jennifer is seen for routine follow-up. Denies any major concerns over the past month. She has not had an ED visits or hospitalizations this past month. She continues to manage her pain primarily at home with hydration, rest, heat, ibuprofen and PRN oxycodone. She feels very proud of her ability to reduce her oxycodone use and would like to speed up her taper. She is hopeful when she decreases her oxycodone to approximately 10 tablets that she will be able to continue to have a few tablets on hand to treat severe pain.     Following a recent infusion visit she notes she developed significant lower extremity pain after she had received fluids and pain medication. The pain was so severe that she felt weak getting in to the cab at home. Once she was home she needed to rest frequently but eventually the pain resolved. Overall she feels that her pain has been worse  "the past few weeks which she largely attributes to increased stress at home. She ensures me she feels safe living with her mother and siblings but has had many issues with her relationship with her mother who is also her PCA. Jennifer and her older sister have gotten very close more recently. She values this relationship, especially when things are emotionally labile at home.      Sickle Cell Disease Comprehensive Checklist    Bone Health/Avascular Necrosis Screening/Symptoms (each visit): no new concerns today    Leg Ulcer evaluation (every visit): none    Hypertension (every visit):stable 3/10/23    Last pulmonary evaluation (asthma, AMAN, pulm HTN): 9/28/22    Stroke/silent cerebral infarct Hx (Y/N): Yes TIA ~2014, first event ~age 1 with full stroke and R sided weakness    Last PCP Visit: 3/6/23    Vaccines:  ? PCV13: 5/13/19  ? Pneumovax (PPSV23): 3/04, 10/09, 7/12/19 (next due 7/2024)  ? Menactra: 4/2010, 9/2015 (MCV done 8/16/21)  ? Influenza: 11/17/2022     Audiology (chelation): done 6/2020, normal.. However, on 6/7, \"While there is no significant change in grade on the CTCAE 4.03 Scale, there were hearing changes bilaterally for both standard and extended high frequency audiometry.  Will need to determine if an ENT consult is advised due to the asymmetry in extended high frequency testing.\"     Plan last reviewed with patient: 7/11/22    Patient background: 21 yo F, enjoys movies and kids though there are times where she does not really want to talk to people. Does not have a lot of social support at home.     Sickle Cell Disease History  Primary Hematologist Team: Jose Rafael Duncan  PCP: none  Genotype: SS  Acute Pain Crisis Treatment: (avoiding IV opioids for now, which she has agreed to)    ER   o Oxycodone 15-20 mg x1  o Ketamine 4mg IV x 2--helps with opioid sparing  o Toradol 15 mg IV x1   o Maintenance IV fluids with LR  o Other: Zofran 8 mg IV PRN nausea    Inpatient:  o Home oxycodone/Oxycontin " regimen, though home oxycodone dosing could be increased to 20 mg to start  o PCA plan:   - None for now  o Other Medications: Zofran  o She has had success with ketamine starting at 4mg/h and advancing only to 6mg/h, as 8mg/h made her feel quite poorly..  o ASA  o Supportive Care: Docusate, Senna  Chronic Pain Medications:    Home regimen Oxycontin 10 mg q12h, oxycodone 15 mg p.o. q.4-6 hours p.r.n. breakthrough pain.  She also continues on Voltaren gel, and Zoloft among other medications.    -Also benefits from mental health visits, acupuncture  Baseline Hemoglobin: 7 g/dl without chronic transfusions  Hydroxyurea use: Yes  H/O blood transfusions: Yes, several (iron overload) Most recent 11/20/2021    H/O Transfusion Reactions: no    Antibodies:none  H/O Acute Chest Syndrome: Yes    Last episode:9/05/22 (previously 4/26/21, 10/2019)     ICU/intubation: not with 9/2022 admission  H/O Stroke: Yes (managed with chronic transfusions in the past, stopped late Spring 2020)  H/O VTE: Yes (2/2021)  H/O Cholecystectomy or Splenectomy: no  H/O Asthma, Pulm HTN, AVN, Leg Ulcers, Nephropathy, Retinopathy, etc: Iron overload, asthma, chronic lung disease, physical limitations from early stroke    ---------------------------------------  Jennifer Cervantes's Goals (discussed today, 7/14/23)    1-3 month goal:  Continue to look for a job    6 month goal:  Save money for ultimate goal of moving out    12 month goal:  Move into her own place     Disease-specific goal(s):  Continue to wean oxycodone down  ---------------------------------------      Current Outpatient Medications   Medication Sig Dispense Refill     acetaminophen (TYLENOL) 325 MG tablet Take 2 tablets (650 mg) by mouth every 6 hours as needed for mild pain 120 tablet 3     albuterol (PROAIR HFA/PROVENTIL HFA/VENTOLIN HFA) 108 (90 Base) MCG/ACT inhaler Inhale 2 puffs into the lungs every 6 hours as needed for shortness of breath or wheezing 8.5 g 3     albuterol  (PROVENTIL) (2.5 MG/3ML) 0.083% neb solution Take 2 vials (5 mg) by nebulization every 6 hours as needed for shortness of breath or wheezing 90 mL 3     aspirin (ASA) 81 MG chewable tablet Take 1 tablet (81 mg) by mouth 2 times daily 60 tablet 11     budesonide-formoterol (SYMBICORT) 160-4.5 MCG/ACT Inhaler Inhale 2 puffs into the lungs 2 times daily 10.2 g 3     cetirizine (ZYRTEC) 10 MG tablet Take 1 tablet (10 mg) by mouth daily 30 tablet 1     deferasirox (JADENU) 360 MG tablet Take 4 tablets (1,440 mg) by mouth every evening 120 tablet 4     diphenhydrAMINE (BENADRYL) 25 MG capsule Take 1 capsule (25 mg) by mouth every 6 hours as needed for itching or allergies 30 capsule 0     EPINEPHrine (ANY BX GENERIC EQUIV) 0.3 MG/0.3ML injection 2-pack Inject 0.3 mLs (0.3 mg) into the muscle as needed for anaphylaxis (Patient not taking: Reported on 3/10/2023) 1 each 1     FLUoxetine (PROZAC) 10 MG capsule Take 1 capsule (10 mg) by mouth daily For two weeks, then increase to 20 mg if 10 mg not effective 40 capsule 1     Hydroxyurea 1000 MG TABS Take 3,000 mg by mouth daily 90 tablet 3     naloxone (NARCAN) 4 MG/0.1ML nasal spray Spray 4 mg into one nostril alternating nostrils as needed for opioid reversal every 2-3 minutes until assistance arrives (Patient not taking: Reported on 5/18/2023)       ondansetron (ZOFRAN) 8 MG tablet Take 1 tablet (8 mg) by mouth every 8 hours as needed (Patient not taking: Reported on 6/5/2023) 30 tablet 1     oxyCODONE IR (ROXICODONE) 15 MG tablet Take 1 tablet (15 mg) by mouth every 4 hours as needed for severe pain Goal 4 per day. Max 6 per day. 22 tablet 0     traZODone (DESYREL) 50 MG tablet Take 1 tablet (50 mg) by mouth At Bedtime 30 tablet 1       Past Medical History  Past Medical History:   Diagnosis Date     Anxiety      Bleeding disorder (H)      Blood clotting disorder (H)      Cerebral infarction (H) 2015     Cognitive developmental delay     low IQ. Please recognize when  managing pain and planning with her     Depressive disorder      Hemiplegia and hemiparesis following cerebral infarction affecting right dominant side (H)     right hand contractures     Iron overload due to repeated red blood cell transfusions      Migraines      Multiple subsegmental pulmonary emboli without acute cor pulmonale (H) 02/01/2021     Oppositional defiant behavior      Presence of intrauterine contraceptive device 2/18/2020     Superficial venous thrombosis of arm, right 03/25/2021     Uncomplicated asthma      Past Surgical History:   Procedure Laterality Date     AS INSERT TUNNELED CV 2 CATH W/O PORT/PUMP       CHOLECYSTECTOMY       CV RIGHT HEART CATH MEASUREMENTS RECORDED N/A 11/18/2021    Procedure: Right Heart Cath;  Surgeon: Jackson Stauffer MD;  Location:  HEART CARDIAC CATH LAB     INSERT PORT VASCULAR ACCESS Left 4/21/2021    Procedure: INSERTION, VASCULAR ACCESS PORT (NOT SURE ON SIDE UNTIL REMOVAL);  Surgeon: Rajan More MD;  Location: [facility] OR     IR CHEST PORT PLACEMENT > 5 YRS OF AGE  4/21/2021     IR CVC NON TUNNEL LINE REMOVAL  5/6/2021     IR CVC NON TUNNEL PLACEMENT > 5 YRS  04/07/2020     IR CVC NON TUNNEL PLACEMENT > 5 YRS  4/30/2021     IR CVC NON TUNNEL PLACEMENT > 5 YRS  9/7/2022     IR PORT REMOVAL LEFT  4/21/2021     REMOVE PORT VASCULAR ACCESS Left 4/21/2021    Procedure: REMOVAL, VASCULAR ACCESS PORT LEFT;  Surgeon: Rajan More MD;  Location: [facility] OR     REPAIR TENDON ELBOW Right 10/02/2019    Procedure: Right Elbow Flexor Lengthening, Flexor Pronator Slide Of Wrist and Finger, Thumb Adductor Lengthening;  Surgeon: Anai Franco MD;  Location: [facility]     TONSILLECTOMY Bilateral 10/02/2019    Procedure: Bilateral Tonsillectomy;  Surgeon: Farhana Guy MD;  Location: [facility]     ZZC BREAST REDUCTION (INCLUDES LIPO) TIER 3 Bilateral 04/23/2019     Allergies   Allergen Reactions     Contrast Dye      Hives and breathing issues     Fish-Derived  Products Hives     Seafood Hives     Gadolinium      Iodinated Contrast Media      Social History   Social History     Tobacco Use     Smoking status: Never     Smokeless tobacco: Never   Substance Use Topics     Alcohol use: Not Currently     Alcohol/week: 0.0 standard drinks of alcohol     Drug use: Never    Still living at home with mom and extended family.     Past medical history and social history were reviewed.    Physical Examination:  LMP  (LMP Unknown)   Wt Readings from Last 10 Encounters:   07/10/23 65.3 kg (144 lb)   06/16/23 67 kg (147 lb 12.8 oz)   06/05/23 67.9 kg (149 lb 11.2 oz)   05/19/23 69.3 kg (152 lb 12.8 oz)   05/13/23 72.2 kg (159 lb 3.2 oz)   05/03/23 74.8 kg (164 lb 14.5 oz)   04/30/23 74.8 kg (165 lb)   04/14/23 69.4 kg (153 lb 1.6 oz)   03/27/23 70.8 kg (156 lb)   03/27/23 69.8 kg (153 lb 14.4 oz)     General: Pleasant female, NAD  Eyes: EOMI, PERRL. Mild. scleral icterus.  Respiratory: Normal respiratory effort.  Ext: No peripheral edema.  MSK: Contractured right arm and hand 2/2 stoke.r  Neurologic: Grossly nonfocal. A/O x 4.  Skin: No rashes, petechiae, or bruising noted on exposed skin.    Laboratory Data:  Most Recent 3 CBC's:  Recent Labs   Lab Test 06/16/23  1024 06/05/23  0910 05/17/23  1048   WBC 13.0* 11.1* 13.5*   HGB 7.9* 7.5* 6.7*   MCV 82 81 77*   * 484* 536*   ANEUTAUTO 10.1* 7.8 9.6*    Most Recent 3 BMP's:  Recent Labs   Lab Test 06/16/23  1024 06/05/23  0910 05/17/23  1048 05/13/23  0605 05/12/23  0731 05/11/23  0648 05/10/23  1948    136 139 141 141 139 135*   POTASSIUM 4.0 3.7 4.0 4.2 4.3 4.3 3.7   CHLORIDE 108* 106 110* 112* 111* 109* 104   CO2 21* 22 19* 20* 20* 19* 19*   BUN 8.7 5.7* 6.5 10.3 8.7 6.3 5.5*   CR 0.59 0.53 0.51 0.62 0.67 0.53 0.58   ANIONGAP 11 8 10 9 10 11 12   MICAH 9.4 9.1 8.9 8.1* 8.3* 8.9 8.9   GLC 88 93 93 96 100* 137* 78   PROTTOTAL  --  7.6  --   --   --  7.2 7.4   ALBUMIN  --  4.5  --  3.8 4.0 4.2 4.4    Most Recent 2  LFT's:  Recent Labs   Lab Test 06/05/23  0910 05/14/23  0633 05/12/23  0731 05/11/23  0648   AST 29  --   --  27   ALT 18  --   --  12   ALKPHOS 67  --   --  71   BILITOTAL 2.2* 1.5*   < > 1.8*    < > = values in this interval not displayed.      Lab Results   Component Value Date    RETP 17.3 (H) 06/16/2023      Assessment and Plan:  1. Sickle Cell HgbSS Disease  2. Recent hospitalization with acute chest, pulmonary edema (1/2023)  3. Chronic Pain  4. 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 History of CVA  7. Hearing loss    Jennifer is doing well following most recent admission for hypoxia and asthma exacerbation. V/Q scan at that time was negative for PE. Historically PE evaluation has been completed with V/Q due to concern for contrast allergy. I am very pleased with her continued taper of oxycodone and reported use at home. She is going many days without taking medication when she is experiencing no pain. We did not adjust her prescription last month when she was acutely ill but will decreased further today, reducing her total pills dispensed to 22 from 25.     Labs today are stable with hemoglobin 7.6 and WBC slightly elevated at 11.1. She continues to adhere to twice weekly infusion visits and will receive IVF and pain medication today in infusion. Crizanlizumab infusions have been resume which she will continue on Fridays.     Desferal is currently on hold due to desire to stop/hold infusion due to decreased quality of life related to the amount of time she needed to be connected for infusion. Remains on Jadenu. A repeat Ferriscan was previously requested but has not yet been scheduled. I will request this again today.     We discussed a trial of cetirizine for seasonal allergies rather than treating symptoms with Benadryl which she is agreeable to.    Plan:  -Continue Hydrea to 3000mg daily to help lessen frequency of sickle cell pain. Refilled  today.  -Continue slow taper of oxycodone. Will decrease to 22 tablets per fill from 25. She can continue the frequency at every 4 hours with a max of 6 tablets per day but with the decreased tablets per refill should aim for a max of 4 tablets per day. She can self-reduce infusion days/week and we will continue to keep the cap at 2/week for now.  -Continue infusion center visits limited to two times per week (Mondays and Fridays ideally although still needs to call to request). Continue diligent home management with current medications, heat, rest, compression, warm baths.   -Begin cetirizine 10 mg daily for seasonal allergies.  -If unable to manage at home can go to ED but continue to not do IV narcotics in the emergency room. Ketamine previously added to pain plan in ED  - Desferal infusions on hold. Continue Jadenu and check Ferriscan. Will likely need to resume infusions in the future.   -Continue aspirin BID  -RTC with me  7/14 (coordinate with [facility] infusion) and 8/11. Follow-up with Dr. Duncan in 3 months.    *** minutes spent on the date of the encounter doing {2021 E&M time in:937798}     Patricia Mantilla CNP          Again, thank you for allowing me to participate in the care of your patient.        Sincerely,        Patricia Mantilla CNP

## 2023-07-17 ENCOUNTER — INFUSION THERAPY VISIT (OUTPATIENT)
Dept: INFUSION THERAPY | Facility: CLINIC | Age: 23
End: 2023-07-17
Attending: PEDIATRICS
Payer: COMMERCIAL

## 2023-07-17 ENCOUNTER — PATIENT OUTREACH (OUTPATIENT)
Dept: CARE COORDINATION | Facility: CLINIC | Age: 23
End: 2023-07-17

## 2023-07-17 ENCOUNTER — NURSE TRIAGE (OUTPATIENT)
Dept: ONCOLOGY | Facility: CLINIC | Age: 23
End: 2023-07-17
Payer: COMMERCIAL

## 2023-07-17 VITALS
SYSTOLIC BLOOD PRESSURE: 124 MMHG | DIASTOLIC BLOOD PRESSURE: 80 MMHG | HEART RATE: 92 BPM | RESPIRATION RATE: 16 BRPM | OXYGEN SATURATION: 98 %

## 2023-07-17 DIAGNOSIS — I69.351 HEMIPLEGIA AND HEMIPARESIS FOLLOWING CEREBRAL INFARCTION AFFECTING RIGHT DOMINANT SIDE (H): ICD-10-CM

## 2023-07-17 DIAGNOSIS — D57.00 SICKLE CELL PAIN CRISIS (H): ICD-10-CM

## 2023-07-17 DIAGNOSIS — G81.10 SPASTIC HEMIPLEGIA, UNSPECIFIED ETIOLOGY, UNSPECIFIED LATERALITY (H): Primary | ICD-10-CM

## 2023-07-17 PROCEDURE — 258N000003 HC RX IP 258 OP 636: Performed by: PEDIATRICS

## 2023-07-17 PROCEDURE — 250N000011 HC RX IP 250 OP 636: Mod: JZ | Performed by: PEDIATRICS

## 2023-07-17 PROCEDURE — 250N000013 HC RX MED GY IP 250 OP 250 PS 637: Performed by: PEDIATRICS

## 2023-07-17 PROCEDURE — 96361 HYDRATE IV INFUSION ADD-ON: CPT

## 2023-07-17 PROCEDURE — 96376 TX/PRO/DX INJ SAME DRUG ADON: CPT

## 2023-07-17 PROCEDURE — 96374 THER/PROPH/DIAG INJ IV PUSH: CPT

## 2023-07-17 RX ORDER — ONDANSETRON 8 MG/1
8 TABLET, ORALLY DISINTEGRATING ORAL
Status: COMPLETED | OUTPATIENT
Start: 2023-07-17 | End: 2023-07-17

## 2023-07-17 RX ORDER — OXYCODONE HYDROCHLORIDE 15 MG/1
15 TABLET ORAL EVERY 4 HOURS PRN
Qty: 20 TABLET | Refills: 0 | Status: SHIPPED | OUTPATIENT
Start: 2023-07-17 | End: 2023-07-24

## 2023-07-17 RX ORDER — DIPHENHYDRAMINE HCL 25 MG
25 CAPSULE ORAL
Status: CANCELLED
Start: 2023-10-01

## 2023-07-17 RX ORDER — ONDANSETRON 4 MG/1
8 TABLET, FILM COATED ORAL
Status: CANCELLED
Start: 2023-10-01

## 2023-07-17 RX ORDER — HEPARIN SODIUM,PORCINE 10 UNIT/ML
5 VIAL (ML) INTRAVENOUS
Status: CANCELLED | OUTPATIENT
Start: 2023-10-01

## 2023-07-17 RX ORDER — HEPARIN SODIUM (PORCINE) LOCK FLUSH IV SOLN 100 UNIT/ML 100 UNIT/ML
5 SOLUTION INTRAVENOUS
Status: DISCONTINUED | OUTPATIENT
Start: 2023-07-17 | End: 2023-07-17 | Stop reason: HOSPADM

## 2023-07-17 RX ORDER — HEPARIN SODIUM (PORCINE) LOCK FLUSH IV SOLN 100 UNIT/ML 100 UNIT/ML
5 SOLUTION INTRAVENOUS
Status: CANCELLED | OUTPATIENT
Start: 2023-10-01

## 2023-07-17 RX ORDER — DIPHENHYDRAMINE HCL 25 MG
25 CAPSULE ORAL
Status: COMPLETED | OUTPATIENT
Start: 2023-07-17 | End: 2023-07-17

## 2023-07-17 RX ADMIN — HYDROMORPHONE HYDROCHLORIDE 1 MG: 1 INJECTION, SOLUTION INTRAMUSCULAR; INTRAVENOUS; SUBCUTANEOUS at 14:57

## 2023-07-17 RX ADMIN — SODIUM CHLORIDE, POTASSIUM CHLORIDE, SODIUM LACTATE AND CALCIUM CHLORIDE 1000 ML: 600; 310; 30; 20 INJECTION, SOLUTION INTRAVENOUS at 14:58

## 2023-07-17 RX ADMIN — ONDANSETRON 8 MG: 8 TABLET, ORALLY DISINTEGRATING ORAL at 15:10

## 2023-07-17 RX ADMIN — DIPHENHYDRAMINE HYDROCHLORIDE 25 MG: 25 CAPSULE ORAL at 14:57

## 2023-07-17 RX ADMIN — HYDROMORPHONE HYDROCHLORIDE 1 MG: 1 INJECTION, SOLUTION INTRAMUSCULAR; INTRAVENOUS; SUBCUTANEOUS at 16:09

## 2023-07-17 RX ADMIN — Medication 5 ML: at 17:08

## 2023-07-17 RX ADMIN — HYDROMORPHONE HYDROCHLORIDE 1 MG: 1 INJECTION, SOLUTION INTRAMUSCULAR; INTRAVENOUS; SUBCUTANEOUS at 17:00

## 2023-07-17 ASSESSMENT — PAIN SCALES - GENERAL: PAINLEVEL: EXTREME PAIN (9)

## 2023-07-17 NOTE — PROGRESS NOTES
Social Work - Transportation  [facility]    Data/Intervention:  Patient Name: Jennifer Cervantes Goes By: Jennifer    /Age: 2000 (23 year old)    Referral From: [facility]   Reason for Referral:  support requested for patient transportation needs for Today's appointment at 3 pm.  Assessment:  called [employer] to arrange ride through patient's insurance. [employer] arranged  for patient from home with [employer]. Patient will need to call [phone] when ready for return ride home.  Plan: Patient is aware of the transportation plan.  available to assist with any other needs.  CARLOS Chavez,[facility]  Hematology/Oncology Social Worker  Phone:[phone] Pager: [phone]\

## 2023-07-17 NOTE — TELEPHONE ENCOUNTER
Narcotic Refill Request    Medication(s) requested:  Oxycodone 15 mg tabs  Person Requesting Refill:Jennifer  What pain is the medication treating: sickle cell pain  How is the medication being taken?:1 tab Q 4H (15 mg/tab)  Does pt have enough for today? Pt states she will be out of medication this afternoon  Is pain being adequately controlled on the current regimen?: yes  Experiencing any side effects from medication?: no    Date of most recent appointment:  7/14/23  Any No Show Visits:no  Next appointment:   8/11/23 with Patricia Mantilla CNP  Last fill date and by whom:  7/14/23 by Patricia Mantilla CNP   Reviewed: no    Routed/Paged provider: Dr. Duncan because Reta JIMENEZ is out of the office today.

## 2023-07-17 NOTE — TELEPHONE ENCOUNTER
[facility] can take at 1500     Call placed to Jennifer and they can take her at 3pm.she confirms she can come to 3pm appointment.      Message sent to Social work    Message sent to [facility] to schedule.

## 2023-07-17 NOTE — PROGRESS NOTES
Infusion Nursing Note:  Jennifer Cervantes presents today for   Chief Complaint   Patient presents with     Infusion     IV fluids, anti-emetics, analgesia       Patient seen by provider today: No   present during visit today: Not Applicable.    Note:   -Orders from Eric Duncan MD completed. Frequency: as needed, via triage line.  -1L LR infused over 2hrs at 500ml/hr.  -8mg Zofran po x1.  -25mg Benadryl po x1.  -1mg Dilaudid IV push over ~2min, hourly x3. Pain at beginning of appt 9/10, low back.    Intravenous Access:  Implanted Port accessed by RN.    Treatment Conditions:  -See triage note from 7/17/23 at 0701.      Roz Kaur RN    /80 (BP Location: Left arm, Patient Position: Fowlers, Cuff Size: Adult Regular)   Pulse 92   Resp 16   LMP  (LMP Unknown)   SpO2 98%

## 2023-07-17 NOTE — TELEPHONE ENCOUNTER
Oncology Nurse Triage - Sickle Cell Pain Crisis:    Situation: Jennifer  calling about Sickle Cell Pain Crisis, requesting to be added on for IV fluids and pain medicine    Background:   Patient's last infusion was 7/14/23  Last clinic visit date:7/14/23  Next clinic visit date: 8/11/23 with Patricia Mantilla CNP  Does patient have active treatment plan?  Yes      Assessment of Symptoms:  Onset/Duration of symptoms: 1 day    Is it typical sickle cell pain? Yes   Location: lower back  Character: Sharp           Intensity: 9/10    Any radiation of pain, numbness, tingling, weakness, warmth, swelling, discoloration of arms or legs?No     Fever?No  Chest Pain Present: No   Shortness of breath: No     Other home therapies tried: HEAT/HEATING PAD and WARM BATH   Last home medication taken and when: oxycodone at 0600  Any Refills Needed?: Yes oxycodone, no, will be out this afternoon,     Does patient have transportation & length of time to get to clinic: No, 25 minutes away    Recommendations: If you do not hear from the infusion center by 2pm then you will not be able to get in for an infusion today. If symptoms worsen while waiting for call back, and/or you experience fever, chills, SOB, chest pain, cough, n/v, dizziness, numbness, swelling, discoloration of extremities, then seek emergency evaluation in Emergency Department. Pt verbalized understanding.    Added to infusion wait list.

## 2023-07-20 ENCOUNTER — PATIENT OUTREACH (OUTPATIENT)
Dept: CARE COORDINATION | Facility: CLINIC | Age: 23
End: 2023-07-20
Payer: COMMERCIAL

## 2023-07-20 ENCOUNTER — NURSE TRIAGE (OUTPATIENT)
Dept: ONCOLOGY | Facility: CLINIC | Age: 23
End: 2023-07-20
Payer: COMMERCIAL

## 2023-07-20 ENCOUNTER — OFFICE VISIT (OUTPATIENT)
Dept: ONCOLOGY | Facility: CLINIC | Age: 23
End: 2023-07-20
Attending: STUDENT IN AN ORGANIZED HEALTH CARE EDUCATION/TRAINING PROGRAM
Payer: COMMERCIAL

## 2023-07-20 VITALS
DIASTOLIC BLOOD PRESSURE: 70 MMHG | SYSTOLIC BLOOD PRESSURE: 111 MMHG | RESPIRATION RATE: 16 BRPM | OXYGEN SATURATION: 97 % | HEART RATE: 91 BPM | TEMPERATURE: 98.2 F

## 2023-07-20 VITALS — HEART RATE: 91 BPM | TEMPERATURE: 98.2 F | SYSTOLIC BLOOD PRESSURE: 111 MMHG | DIASTOLIC BLOOD PRESSURE: 70 MMHG

## 2023-07-20 DIAGNOSIS — I69.351 HEMIPLEGIA AND HEMIPARESIS FOLLOWING CEREBRAL INFARCTION AFFECTING RIGHT DOMINANT SIDE (H): ICD-10-CM

## 2023-07-20 DIAGNOSIS — G81.10 SPASTIC HEMIPLEGIA, UNSPECIFIED ETIOLOGY, UNSPECIFIED LATERALITY (H): Primary | ICD-10-CM

## 2023-07-20 DIAGNOSIS — D57.00 SICKLE CELL PAIN CRISIS (H): ICD-10-CM

## 2023-07-20 PROCEDURE — 250N000011 HC RX IP 250 OP 636: Performed by: PEDIATRICS

## 2023-07-20 PROCEDURE — 64643 CHEMODENERV 1 EXTREM 1-4 EA: CPT | Performed by: STUDENT IN AN ORGANIZED HEALTH CARE EDUCATION/TRAINING PROGRAM

## 2023-07-20 PROCEDURE — 64644 CHEMODENERV 1 EXTREM 5/> MUS: CPT | Mod: GC | Performed by: STUDENT IN AN ORGANIZED HEALTH CARE EDUCATION/TRAINING PROGRAM

## 2023-07-20 PROCEDURE — 95874 GUIDE NERV DESTR NEEDLE EMG: CPT | Performed by: STUDENT IN AN ORGANIZED HEALTH CARE EDUCATION/TRAINING PROGRAM

## 2023-07-20 PROCEDURE — 250N000013 HC RX MED GY IP 250 OP 250 PS 637: Performed by: PEDIATRICS

## 2023-07-20 PROCEDURE — 96374 THER/PROPH/DIAG INJ IV PUSH: CPT | Mod: 59

## 2023-07-20 PROCEDURE — 96361 HYDRATE IV INFUSION ADD-ON: CPT | Mod: 59

## 2023-07-20 PROCEDURE — 258N000003 HC RX IP 258 OP 636: Performed by: PEDIATRICS

## 2023-07-20 PROCEDURE — 64644 CHEMODENERV 1 EXTREM 5/> MUS: CPT | Performed by: STUDENT IN AN ORGANIZED HEALTH CARE EDUCATION/TRAINING PROGRAM

## 2023-07-20 PROCEDURE — 96376 TX/PRO/DX INJ SAME DRUG ADON: CPT

## 2023-07-20 PROCEDURE — 250N000011 HC RX IP 250 OP 636: Mod: JZ | Performed by: PEDIATRICS

## 2023-07-20 RX ORDER — HEPARIN SODIUM (PORCINE) LOCK FLUSH IV SOLN 100 UNIT/ML 100 UNIT/ML
5 SOLUTION INTRAVENOUS
Status: CANCELLED | OUTPATIENT
Start: 2023-10-01

## 2023-07-20 RX ORDER — ONDANSETRON 4 MG/1
8 TABLET, FILM COATED ORAL
Status: CANCELLED
Start: 2023-10-01

## 2023-07-20 RX ORDER — HEPARIN SODIUM,PORCINE 10 UNIT/ML
5 VIAL (ML) INTRAVENOUS
Status: CANCELLED | OUTPATIENT
Start: 2023-10-01

## 2023-07-20 RX ORDER — DIPHENHYDRAMINE HCL 25 MG
25 CAPSULE ORAL
Status: CANCELLED
Start: 2023-10-01

## 2023-07-20 RX ORDER — DIPHENHYDRAMINE HCL 25 MG
25 CAPSULE ORAL
Status: COMPLETED | OUTPATIENT
Start: 2023-07-20 | End: 2023-07-20

## 2023-07-20 RX ORDER — HEPARIN SODIUM,PORCINE 10 UNIT/ML
5 VIAL (ML) INTRAVENOUS
Status: DISCONTINUED | OUTPATIENT
Start: 2023-07-20 | End: 2023-07-20 | Stop reason: HOSPADM

## 2023-07-20 RX ORDER — ONDANSETRON 4 MG/1
8 TABLET, ORALLY DISINTEGRATING ORAL
Status: COMPLETED | OUTPATIENT
Start: 2023-07-20 | End: 2023-07-20

## 2023-07-20 RX ORDER — HEPARIN SODIUM (PORCINE) LOCK FLUSH IV SOLN 100 UNIT/ML 100 UNIT/ML
5 SOLUTION INTRAVENOUS
Status: DISCONTINUED | OUTPATIENT
Start: 2023-07-20 | End: 2023-07-20 | Stop reason: HOSPADM

## 2023-07-20 RX ADMIN — DIPHENHYDRAMINE HYDROCHLORIDE 25 MG: 25 CAPSULE ORAL at 12:19

## 2023-07-20 RX ADMIN — SODIUM CHLORIDE, POTASSIUM CHLORIDE, SODIUM LACTATE AND CALCIUM CHLORIDE 1000 ML: 600; 310; 30; 20 INJECTION, SOLUTION INTRAVENOUS at 12:11

## 2023-07-20 RX ADMIN — HYDROMORPHONE HYDROCHLORIDE 1 MG: 1 INJECTION, SOLUTION INTRAMUSCULAR; INTRAVENOUS; SUBCUTANEOUS at 12:15

## 2023-07-20 RX ADMIN — ONDANSETRON 8 MG: 4 TABLET, ORALLY DISINTEGRATING ORAL at 12:19

## 2023-07-20 RX ADMIN — HYDROMORPHONE HYDROCHLORIDE 1 MG: 1 INJECTION, SOLUTION INTRAMUSCULAR; INTRAVENOUS; SUBCUTANEOUS at 13:15

## 2023-07-20 RX ADMIN — HYDROMORPHONE HYDROCHLORIDE 1 MG: 1 INJECTION, SOLUTION INTRAMUSCULAR; INTRAVENOUS; SUBCUTANEOUS at 14:19

## 2023-07-20 RX ADMIN — ONABOTULINUMTOXINA 200 UNITS: 200 INJECTION, POWDER, LYOPHILIZED, FOR SOLUTION INTRADERMAL; INTRAMUSCULAR at 17:39

## 2023-07-20 RX ADMIN — Medication 5 ML: at 15:01

## 2023-07-20 ASSESSMENT — PAIN SCALES - GENERAL: PAINLEVEL: WORST PAIN (10)

## 2023-07-20 NOTE — PATIENT INSTRUCTIONS
Your received your next set of botox injections today.  You will be scheduled for your next set of injections in 12 weeks.

## 2023-07-20 NOTE — TELEPHONE ENCOUNTER
"Oncology Nurse Triage - Sickle Cell Pain Crisis:  Situation: Jennifer  calling about Sickle Cell Pain Crisis, requesting to be added on for IV fluids and pain medicine    Background:   Patient's last infusion was 7/17/23  Last clinic visit date: 7/14/23 w/ Patricia Mantilla  Does patient have active treatment plan?  Yes    Assessment of Symptoms:  Onset/Duration of symptoms: 2 day    Is it typical sickle cell pain? Yes   Location: generalized all over  Character: Sharp           Intensity: 10/10    Any radiation of pain, numbness, tingling, weakness, warmth, swelling, discoloration of arms or legs?No     Fever? No    Chest Pain Present: No     Shortness of breath: No     Other home therapies tried: HEAT/HEATING PAD     Last home medication taken and when: 0600 oxycodone     Any Refills Needed?: No     Does patient have transportation & length of time to get to clinic: No - needs transportation. Has cab ride set up for 1:45 for afternoon appt- would need assistance if can get in sooner than ride.    Recommendations:  Added to infusion wait list  0927 call to pt to offer 1230 appt, pt accepting and requests to let Dr. Pierce know she will be late for 1445 appt, said \"I would not have called if I didn't really need this. I have been in pain all morning\".   8417 message sent to  and scheduling.     If you do not hear from the infusion center by 2pm then you will not be able to get in for an infusion today. If symptoms worsen while waiting for call back, and/or you experience fever, chills, SOB, chest pain, cough, n/v, dizziness, numbness, swelling, discoloration of extremities, then seek emergency evaluation in Emergency Department.     Please note, if you are late for your appt, you risk losing your infusion appt as it may delay another patient's infusion who arrived on time.           "

## 2023-07-20 NOTE — NURSING NOTE
"Oncology Rooming Note    July 20, 2023 4:26 PM   Jennifer Cervantes is a 23 year old female who presents for:    Chief Complaint   Patient presents with     Oncology Clinic Visit     Sickle Cell Pain Crisis     Initial Vitals: /70   Pulse 91   Temp 98.2  F (36.8  C) (Oral)   LMP  (LMP Unknown)  Estimated body mass index is 24.68 kg/m  as calculated from the following:    Height as of 5/10/23: 1.626 m (5' 4\").    Weight as of 7/14/23: 65.2 kg (143 lb 12.8 oz). There is no height or weight on file to calculate BSA.  Worst Pain (10) Comment: Data Unavailable   No LMP recorded (lmp unknown). Patient has had an implant.  Allergies reviewed: Yes  Medications reviewed: Yes    Medications: Medication refills not needed today.  Pharmacy name entered into EPIC: [facility] [place], [place] - [address] [facility] [id number]    Clinical concerns: Patient states there are no new concerns to discuss with provider.    Robbin Shaffer              "

## 2023-07-20 NOTE — LETTER
7/20/2023         RE: Jennifer Cervantes  [address]  [facility] [address]        Dear Colleague,    Thank you for referring your patient, Jennifer Cervantes, to the [facility]. Please see a copy of my visit note below.    PM&R Botox Procedure Note    Chief Complaint: Spastic Hemiparesis    LMP  (LMP Unknown)        Current Outpatient Medications:     acetaminophen (TYLENOL) 325 MG tablet, Take 2 tablets (650 mg) by mouth every 6 hours as needed for mild pain, Disp: 120 tablet, Rfl: 3    albuterol (PROAIR HFA/PROVENTIL HFA/VENTOLIN HFA) 108 (90 Base) MCG/ACT inhaler, Inhale 2 puffs into the lungs every 6 hours as needed for shortness of breath or wheezing, Disp: 8.5 g, Rfl: 3    albuterol (PROVENTIL) (2.5 MG/3ML) 0.083% neb solution, Take 2 vials (5 mg) by nebulization every 6 hours as needed for shortness of breath or wheezing, Disp: 90 mL, Rfl: 3    aspirin (ASA) 81 MG chewable tablet, Take 1 tablet (81 mg) by mouth 2 times daily, Disp: 60 tablet, Rfl: 11    budesonide-formoterol (SYMBICORT) 160-4.5 MCG/ACT Inhaler, Inhale 2 puffs into the lungs 2 times daily, Disp: 10.2 g, Rfl: 3    cetirizine (ZYRTEC) 10 MG tablet, Take 1 tablet (10 mg) by mouth daily, Disp: 30 tablet, Rfl: 1    deferasirox (JADENU) 360 MG tablet, Take 4 tablets (1,440 mg) by mouth every evening, Disp: 120 tablet, Rfl: 4    diphenhydrAMINE (BENADRYL) 25 MG capsule, Take 1 capsule (25 mg) by mouth every 6 hours as needed for itching or allergies, Disp: 30 capsule, Rfl: 0    EPINEPHrine (ANY BX GENERIC EQUIV) 0.3 MG/0.3ML injection 2-pack, Inject 0.3 mLs (0.3 mg) into the muscle as needed for anaphylaxis (Patient not taking: Reported on 3/10/2023), Disp: 1 each, Rfl: 1    FLUoxetine (PROZAC) 10 MG capsule, Take 1 capsule (10 mg) by mouth daily For two weeks, then increase to 20 mg if 10 mg not effective, Disp: 40 capsule, Rfl: 1    Hydroxyurea 1000 MG TABS, Take 3,000 mg by mouth daily, Disp: 90 tablet, Rfl: 3     naloxone (NARCAN) 4 MG/0.1ML nasal spray, Spray 4 mg into one nostril alternating nostrils as needed for opioid reversal every 2-3 minutes until assistance arrives (Patient not taking: Reported on 5/18/2023), Disp: , Rfl:     ondansetron (ZOFRAN) 8 MG tablet, Take 1 tablet (8 mg) by mouth every 8 hours as needed (Patient not taking: Reported on 6/5/2023), Disp: 30 tablet, Rfl: 1    oxyCODONE IR (ROXICODONE) 15 MG tablet, Take 1 tablet (15 mg) by mouth every 4 hours as needed for severe pain Goal 4 per day. Max 6 per day., Disp: 20 tablet, Rfl: 0    traZODone (DESYREL) 50 MG tablet, Take 1 tablet (50 mg) by mouth At Bedtime, Disp: 30 tablet, Rfl: 1  No current facility-administered medications for this visit.    Facility-Administered Medications Ordered in Other Visits:     heparin 100 unit/mL injection 5 mL, 5 mL, Intracatheter, Once PRN, Eric Duncan MD    sodium chloride (PF) 0.9% PF flush 3-20 mL, 3-20 mL, Intracatheter, Q1H PRN, Eric Duncan MD        Allergies   Allergen Reactions    Contrast Dye      Hives and breathing issues    Fish-Derived Products Hives    Seafood Hives    Gadolinium     Iodinated Contrast Media         PHYSICAL EXAM      Motor: 4+/5 with right shoulder abduction, 4/5 with right elbow flexion, unable to assess wrist flexion due to contracture. 4/5 with  strength on right. 5/5 left shoulder abduction, elbow extension, wrist extension and  strength/finger intrinsics. 4/5 with right hip flexion, 4/5 with right knee extension, 3/5 with right ankle dorsiflexion, 4+/5 with right EHL, plantar flexion. 5/5 with all muscle groups in left lower extremity.  ROM is 120 degrees with right shoulder abduction, about 130 degrees with right elbow extension.   Tone with MAS- 2/4 with right shoulder abduction, 3/4 with right finger flexors/intrinsics, 2/4 with right knee flexion.Significant right wrist contracture with minimal extension.   Sensory: intact to light touch throughout  "all dermatomes in bilateral lower extremities.      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      Last seen 2023 for botox: At that time on exam: Tone with MAS- 2/4 with right shoulder abduction, 3/4 with right finger flexors/intrinsics, 2/4 with right knee flexion. Significant right wrist contracture with minimal extension -> Last injection:   Right biceps- 40 U at 2 sites  Right BR- 30 U  Right pronator teres- 30 U  Right FDS- 25 U  Right FCR- 25 U  Right biceps femoris- 50 U at 2 sites    Unable to make  appt d/t acute pain. Patient states since then she is doing okay, states RUE + RLE - botox worked after last visit and lasted several weeks. Pt groggy from pain medication that she received prior to this provider visit.     RESPONSE TO PREVIOUS TREATMENT:  Significant improvement in tone that lasted \"several\" weeks    BOTULINUM NEUROTOXIN INJECTION PROCEDURES:    VERIFICATION OF PATIENT IDENTIFICATION  Responsible Person:  RUIZ  : verified  Full Name: verified    INDICATIONS FOR PROCEDURES:  Jennifer Cervantes is a 21 year old patient with spasticity affecting the right upper extremity and right lower extremity secondary to a diagnosis of sickle cell disease and previous CVA with resulting spastic hemiparesis with associated pain, loss of joint motion, loss of volitional motor control, hygiene difficulty, difficulty with activities of daily living and difficulty with ambulation and transfers.    Her baseline symptoms have been recalcitrant to oral medications and conservative therapy.  She is here today for reinjection with Botox.    GOAL OF PROCEDURE:  The goal of this procedure is to improve increase active range of motion, improve volitional motor control, decrease pain  and enhance functional independence associated with spasticity.    TOTAL DOSE ADMINISTERED:  Dose Administered:  200 units Botox (Botulinum Toxin Type " A)       1:1 Dilution     Diluent Used:  Preservative Free Normal Saline  Total Volume of Diluent Used:  2 ml       CONSENT:  The risks, benefits, and treatment options were discussed with Jennifer Cervantes and she agreed to proceed.    EQUIPMENT USED:  Needle-27mm stimulating/recording  EMG/NCS Machine    SKIN PREPARATION:  Skin preparation was performed using an alcohol wipe.    GUIDANCE DESCRIPTION:  Electro-myographic guidance was necessary throughout the procedure to accurately identify all areas of spastic muscles while avoiding injection of non-spastic muscles and underlying muscles , neighboring nerves and nearby vascular structures.     AREA/MUSCLE INJECTED:  Right biceps- 40 U at 2 sites  Right BR- 30 U  Right pronator teres- 30 U  Right FDS- 25 U  Right FCR- 25 U  Right biceps femoris- 50 U at 2 sites    RESPONSE TO PROCEDURE:  Jennifer Cervantes tolerated the procedure well and there were no immediate complications. She was allowed to recover for an appropriate period of time and was discharged home in stable condition.     Jennifer Cervantes will follow up by phone with Dr. Pierce for any questions or concerns that may arise.  Jennifer Cervantes will be scheduled for the next series of injections in 12 weeks.    Physician Attestation   I, Katherine Pierce MD, saw this patient and agree with the findings and plan of care as documented in the note.      Items personally reviewed/procedural attestation: vitals, labs, imaging and agree with the interpretation documented in the note and I was present for and supervised the entire procedure.    Katherine iPerce MD

## 2023-07-20 NOTE — PROGRESS NOTES
PM&R Botox Procedure Note    Chief Complaint: Spastic Hemiparesis    LMP  (LMP Unknown)        Current Outpatient Medications:      acetaminophen (TYLENOL) 325 MG tablet, Take 2 tablets (650 mg) by mouth every 6 hours as needed for mild pain, Disp: 120 tablet, Rfl: 3     albuterol (PROAIR HFA/PROVENTIL HFA/VENTOLIN HFA) 108 (90 Base) MCG/ACT inhaler, Inhale 2 puffs into the lungs every 6 hours as needed for shortness of breath or wheezing, Disp: 8.5 g, Rfl: 3     albuterol (PROVENTIL) (2.5 MG/3ML) 0.083% neb solution, Take 2 vials (5 mg) by nebulization every 6 hours as needed for shortness of breath or wheezing, Disp: 90 mL, Rfl: 3     aspirin (ASA) 81 MG chewable tablet, Take 1 tablet (81 mg) by mouth 2 times daily, Disp: 60 tablet, Rfl: 11     budesonide-formoterol (SYMBICORT) 160-4.5 MCG/ACT Inhaler, Inhale 2 puffs into the lungs 2 times daily, Disp: 10.2 g, Rfl: 3     cetirizine (ZYRTEC) 10 MG tablet, Take 1 tablet (10 mg) by mouth daily, Disp: 30 tablet, Rfl: 1     deferasirox (JADENU) 360 MG tablet, Take 4 tablets (1,440 mg) by mouth every evening, Disp: 120 tablet, Rfl: 4     diphenhydrAMINE (BENADRYL) 25 MG capsule, Take 1 capsule (25 mg) by mouth every 6 hours as needed for itching or allergies, Disp: 30 capsule, Rfl: 0     EPINEPHrine (ANY BX GENERIC EQUIV) 0.3 MG/0.3ML injection 2-pack, Inject 0.3 mLs (0.3 mg) into the muscle as needed for anaphylaxis (Patient not taking: Reported on 3/10/2023), Disp: 1 each, Rfl: 1     FLUoxetine (PROZAC) 10 MG capsule, Take 1 capsule (10 mg) by mouth daily For two weeks, then increase to 20 mg if 10 mg not effective, Disp: 40 capsule, Rfl: 1     Hydroxyurea 1000 MG TABS, Take 3,000 mg by mouth daily, Disp: 90 tablet, Rfl: 3     naloxone (NARCAN) 4 MG/0.1ML nasal spray, Spray 4 mg into one nostril alternating nostrils as needed for opioid reversal every 2-3 minutes until assistance arrives (Patient not taking: Reported on 5/18/2023), Disp: , Rfl:      ondansetron  (ZOFRAN) 8 MG tablet, Take 1 tablet (8 mg) by mouth every 8 hours as needed (Patient not taking: Reported on 6/5/2023), Disp: 30 tablet, Rfl: 1     oxyCODONE IR (ROXICODONE) 15 MG tablet, Take 1 tablet (15 mg) by mouth every 4 hours as needed for severe pain Goal 4 per day. Max 6 per day., Disp: 20 tablet, Rfl: 0     traZODone (DESYREL) 50 MG tablet, Take 1 tablet (50 mg) by mouth At Bedtime, Disp: 30 tablet, Rfl: 1  No current facility-administered medications for this visit.    Facility-Administered Medications Ordered in Other Visits:      heparin 100 unit/mL injection 5 mL, 5 mL, Intracatheter, Once PRN, Eric Duncan MD     sodium chloride (PF) 0.9% PF flush 3-20 mL, 3-20 mL, Intracatheter, Q1H PRN, Eric Duncan MD        Allergies   Allergen Reactions     Contrast Dye      Hives and breathing issues     Fish-Derived Products Hives     Seafood Hives     Gadolinium      Iodinated Contrast Media         PHYSICAL EXAM      Motor: 4+/5 with right shoulder abduction, 4/5 with right elbow flexion, unable to assess wrist flexion due to contracture. 4/5 with  strength on right. 5/5 left shoulder abduction, elbow extension, wrist extension and  strength/finger intrinsics. 4/5 with right hip flexion, 4/5 with right knee extension, 3/5 with right ankle dorsiflexion, 4+/5 with right EHL, plantar flexion. 5/5 with all muscle groups in left lower extremity.  ROM is 120 degrees with right shoulder abduction, about 130 degrees with right elbow extension.   Tone with MAS- 2/4 with right shoulder abduction, 3/4 with right finger flexors/intrinsics, 2/4 with right knee flexion.Significant right wrist contracture with minimal extension.   Sensory: intact to light touch throughout all dermatomes in bilateral lower extremities.      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      Last seen Feb 2023 for botox: At that time on  "exam: Tone with MAS- 2/4 with right shoulder abduction, 3/4 with right finger flexors/intrinsics, 2/4 with right knee flexion. Significant right wrist contracture with minimal extension -> Last injection:   Right biceps- 40 U at 2 sites  Right BR- 30 U  Right pronator teres- 30 U  Right FDS- 25 U  Right FCR- 25 U  Right biceps femoris- 50 U at 2 sites    Unable to make  appt d/t acute pain. Patient states since then she is doing okay, states RUE + RLE - botox worked after last visit and lasted several weeks. Pt groggy from pain medication that she received prior to this provider visit.     RESPONSE TO PREVIOUS TREATMENT:  Significant improvement in tone that lasted \"several\" weeks    BOTULINUM NEUROTOXIN INJECTION PROCEDURES:    VERIFICATION OF PATIENT IDENTIFICATION  Responsible Person:  RUIZ  : verified  Full Name: verified    INDICATIONS FOR PROCEDURES:  Jennifer Cervantes is a 21 year old patient with spasticity affecting the right upper extremity and right lower extremity secondary to a diagnosis of sickle cell disease and previous CVA with resulting spastic hemiparesis with associated pain, loss of joint motion, loss of volitional motor control, hygiene difficulty, difficulty with activities of daily living and difficulty with ambulation and transfers.    Her baseline symptoms have been recalcitrant to oral medications and conservative therapy.  She is here today for reinjection with Botox.    GOAL OF PROCEDURE:  The goal of this procedure is to improve increase active range of motion, improve volitional motor control, decrease pain  and enhance functional independence associated with spasticity.    TOTAL DOSE ADMINISTERED:  Dose Administered:  200 units Botox (Botulinum Toxin Type A)       1:1 Dilution     Diluent Used:  Preservative Free Normal Saline  Total Volume of Diluent Used:  2 ml       CONSENT:  The risks, benefits, and treatment options were discussed with Jennifer Cervantes and she agreed to " proceed.    EQUIPMENT USED:  Needle-27mm stimulating/recording  EMG/NCS Machine    SKIN PREPARATION:  Skin preparation was performed using an alcohol wipe.    GUIDANCE DESCRIPTION:  Electro-myographic guidance was necessary throughout the procedure to accurately identify all areas of spastic muscles while avoiding injection of non-spastic muscles and underlying muscles , neighboring nerves and nearby vascular structures.     AREA/MUSCLE INJECTED:  Right biceps- 40 U at 2 sites  Right BR- 30 U  Right pronator teres- 30 U  Right FDS- 25 U  Right FCR- 25 U  Right biceps femoris- 50 U at 2 sites    RESPONSE TO PROCEDURE:  Jennifer Cervantes tolerated the procedure well and there were no immediate complications. She was allowed to recover for an appropriate period of time and was discharged home in stable condition.     Jennifer Cervantes will follow up by phone with Dr. Pierce for any questions or concerns that may arise.  Jennifer Cervantes will be scheduled for the next series of injections in 12 weeks.    Physician Attestation   I, Katherine Pierce MD, saw this patient and agree with the findings and plan of care as documented in the note.      Items personally reviewed/procedural attestation: vitals, labs, imaging and agree with the interpretation documented in the note and I was present for and supervised the entire procedure.    Katherine Pierce MD

## 2023-07-20 NOTE — PROGRESS NOTES
Infusion Nursing Note:  Jennifer Cervantes presents today for IV Fluids/Pain meds.    Patient seen by provider today: No   present during visit today: Not Applicable.    Note: Patient reports 10/10 generalized body pain. She took her pain meds at 6am this morning without relief. Denies chest pain, SOB, dizziness, weakness, N/V/D, fever, chills or other concerns.    Pain goal 4/10.    After 3 doses of IV Dilaudid, pt reported pain 4/10 and ready for discharge.      Intravenous Access:  Implanted Port.    Treatment Conditions:  Not Applicable.      Post Infusion Assessment:  Patient tolerated infusion without incident.  Blood return noted pre and post infusion.  Site patent and intact, free from redness, edema or discomfort.  Access discontinued per protocol.       Discharge Plan:   Patient declined prescription refills.  Discharge instructions reviewed with: Patient.  Patient and/or family verbalized understanding of discharge instructions and all questions answered.  AVS to patient via [employer].  Patient will return 8/11 for next appointment.   Patient discharged in stable condition accompanied by: self.  Departure Mode: Ambulatory.      Allison Orozco RN

## 2023-07-20 NOTE — PROGRESS NOTES
Social Work - Transportation  [facility]    Data/Intervention:  Patient Name: Jennifer Cervantes   Goes By: Jennifer    /Age: 2000 (23 year old)    Referral From: [facility]  Reason for Referral:  support requested for patient transportation needs for today's appointment 23.  Assessment:  called [employer] to arrange ride through patient's insurance. [employer] arranged  for patient from home with [employer]. Patient will need to call [phone] when ready for return ride home.  Plan: Patient is aware of the transportation plan.  available to assist with any other needs.  CARLOS Chavez,SW  Hematology/Oncology Social Worker  Phone:[phone] Pager: [phone]

## 2023-07-24 ENCOUNTER — NURSE TRIAGE (OUTPATIENT)
Dept: ONCOLOGY | Facility: CLINIC | Age: 23
End: 2023-07-24
Payer: COMMERCIAL

## 2023-07-24 ENCOUNTER — INFUSION THERAPY VISIT (OUTPATIENT)
Dept: TRANSPLANT | Facility: CLINIC | Age: 23
End: 2023-07-24
Attending: PEDIATRICS
Payer: COMMERCIAL

## 2023-07-24 VITALS
HEART RATE: 93 BPM | OXYGEN SATURATION: 94 % | SYSTOLIC BLOOD PRESSURE: 121 MMHG | DIASTOLIC BLOOD PRESSURE: 76 MMHG | TEMPERATURE: 98.7 F | RESPIRATION RATE: 16 BRPM

## 2023-07-24 DIAGNOSIS — G81.10 SPASTIC HEMIPLEGIA, UNSPECIFIED ETIOLOGY, UNSPECIFIED LATERALITY (H): ICD-10-CM

## 2023-07-24 DIAGNOSIS — D57.00 SICKLE CELL PAIN CRISIS (H): ICD-10-CM

## 2023-07-24 DIAGNOSIS — D57.00 SICKLE CELL PAIN CRISIS (H): Primary | ICD-10-CM

## 2023-07-24 PROCEDURE — 258N000003 HC RX IP 258 OP 636: Performed by: PEDIATRICS

## 2023-07-24 PROCEDURE — 96376 TX/PRO/DX INJ SAME DRUG ADON: CPT

## 2023-07-24 PROCEDURE — 250N000011 HC RX IP 250 OP 636: Performed by: PEDIATRICS

## 2023-07-24 PROCEDURE — 96361 HYDRATE IV INFUSION ADD-ON: CPT

## 2023-07-24 PROCEDURE — 96374 THER/PROPH/DIAG INJ IV PUSH: CPT

## 2023-07-24 PROCEDURE — 250N000013 HC RX MED GY IP 250 OP 250 PS 637: Performed by: PEDIATRICS

## 2023-07-24 RX ORDER — OXYCODONE HYDROCHLORIDE 15 MG/1
15 TABLET ORAL EVERY 4 HOURS PRN
Qty: 20 TABLET | Refills: 0 | Status: SHIPPED | OUTPATIENT
Start: 2023-07-24 | End: 2023-07-31

## 2023-07-24 RX ORDER — HEPARIN SODIUM (PORCINE) LOCK FLUSH IV SOLN 100 UNIT/ML 100 UNIT/ML
5 SOLUTION INTRAVENOUS
Status: CANCELLED | OUTPATIENT
Start: 2023-10-01

## 2023-07-24 RX ORDER — DIPHENHYDRAMINE HCL 25 MG
25 CAPSULE ORAL
Status: CANCELLED
Start: 2023-10-01

## 2023-07-24 RX ORDER — DIPHENHYDRAMINE HCL 25 MG
25 CAPSULE ORAL
Status: COMPLETED | OUTPATIENT
Start: 2023-07-24 | End: 2023-07-24

## 2023-07-24 RX ORDER — HEPARIN SODIUM,PORCINE 10 UNIT/ML
5 VIAL (ML) INTRAVENOUS
Status: CANCELLED | OUTPATIENT
Start: 2023-10-01

## 2023-07-24 RX ORDER — HEPARIN SODIUM (PORCINE) LOCK FLUSH IV SOLN 100 UNIT/ML 100 UNIT/ML
5 SOLUTION INTRAVENOUS
Status: DISCONTINUED | OUTPATIENT
Start: 2023-07-24 | End: 2023-07-24 | Stop reason: HOSPADM

## 2023-07-24 RX ORDER — ONDANSETRON 4 MG/1
8 TABLET, FILM COATED ORAL
Status: CANCELLED
Start: 2023-10-01

## 2023-07-24 RX ADMIN — HYDROMORPHONE HYDROCHLORIDE 1 MG: 1 INJECTION, SOLUTION INTRAMUSCULAR; INTRAVENOUS; SUBCUTANEOUS at 10:02

## 2023-07-24 RX ADMIN — SODIUM CHLORIDE, POTASSIUM CHLORIDE, SODIUM LACTATE AND CALCIUM CHLORIDE 1000 ML: 600; 310; 30; 20 INJECTION, SOLUTION INTRAVENOUS at 09:03

## 2023-07-24 RX ADMIN — HYDROMORPHONE HYDROCHLORIDE 1 MG: 1 INJECTION, SOLUTION INTRAMUSCULAR; INTRAVENOUS; SUBCUTANEOUS at 11:07

## 2023-07-24 RX ADMIN — HYDROMORPHONE HYDROCHLORIDE 1 MG: 1 INJECTION, SOLUTION INTRAMUSCULAR; INTRAVENOUS; SUBCUTANEOUS at 09:03

## 2023-07-24 RX ADMIN — DIPHENHYDRAMINE HYDROCHLORIDE 25 MG: 25 CAPSULE ORAL at 11:07

## 2023-07-24 RX ADMIN — Medication 5 ML: at 11:43

## 2023-07-24 ASSESSMENT — PAIN SCALES - GENERAL: PAINLEVEL: EXTREME PAIN (9)

## 2023-07-24 NOTE — NURSING NOTE
"Oncology Rooming Note    July 24, 2023 10:06 AM   Jennifer Cervantes is a 23 year old female who presents for:    Chief Complaint   Patient presents with    Infusion     Add on IV fluids and pain medications. Hx sickle cell disease.     Initial Vitals: /76 (BP Location: Right arm, Patient Position: Sitting, Cuff Size: Adult Regular)   Pulse 93   Temp 98.7  F (37.1  C) (Oral)   Resp 16   LMP  (LMP Unknown)   SpO2 94%  Estimated body mass index is 24.68 kg/m  as calculated from the following:    Height as of 5/10/23: 1.626 m (5' 4\").    Weight as of 7/14/23: 65.2 kg (143 lb 12.8 oz). There is no height or weight on file to calculate BSA.  Extreme Pain (9) Comment: Data Unavailable   No LMP recorded (lmp unknown). Patient has had an implant.  Allergies reviewed: Yes  Medications reviewed: Yes    Medications: refill for oxycodone was brought to patient by pharmacy.  Pharmacy name entered into [facility]: [facility] [place], [place] - [phone]    Clinical concerns: none       Jamaica Napoles RN              "

## 2023-07-24 NOTE — PROGRESS NOTES
Infusion Nursing Note:  Jennifer Cervantes presents today for IV fluids and pain medications.    Patient seen by provider today: No   present during visit today: Not Applicable.    Note:   Pt received a liter of LR at 500 ml/hr.     Pt received 25 mg po benadryl for itching.    Pt received 1 mg dilaudid IV every hour for a total of 3 doses (3 mg dilaudid total).      Intravenous Access:  Implanted Port.    Treatment Conditions:  Not Applicable.      Post Infusion Assessment:  Patient tolerated infusion without incident.  Blood return noted pre and post infusion.  Site patent and intact, free from redness, edema or discomfort.  No evidence of extravasations.  Access discontinued per protocol.       Discharge Plan:   Prescription refills given for oxycodone.  Discharge instructions reviewed with: Patient.  Patient and/or family verbalized understanding of discharge instructions and all questions answered.  Patient discharged in stable condition accompanied by: self.  Departure Mode: Ambulatory.      Jamaica Napoles RN

## 2023-07-24 NOTE — TELEPHONE ENCOUNTER
Narcotic Refill Request    Medication(s) requested:  Oxycodone 15 mg tablet  Person Requesting Refill:Jennifer  What pain is the medication treating: sickle cell pain  How is the medication being taken?:1 tab Q 4H  Does pt have enough for today? She will be out this afternoon  Is pain being adequately controlled on the current regimen?: yes  Experiencing any side effects from medication?: no    Date of most recent appointment:  7/14/23 with Patricia Mantilla CNP  Any No Show Visits:no  Next appointment:   8/11/23 with Patricia Mantilla CNP  Last fill date and by whom:  7/17/23 by Patricia Mantilla CNP   Reviewed: no    Routed/Paged provider: Dr. Duncan as Patricia is out of office.

## 2023-07-24 NOTE — TELEPHONE ENCOUNTER
Oncology Nurse Triage - Sickle Cell Pain Crisis:  Situation: Jennifer  calling about Sickle Cell Pain Crisis, requesting to be added on for IV fluids and pain medicine    Background:   Patient's last infusion was 7/20/23  Last clinic visit date:7/14/23 with Patricia Mantilla CNP  Next clinic visit date: 8/11/23 with Patricia Mantilla CNP  Does patient have active treatment plan?  Yes    Assessment of Symptoms:  Onset/Duration of symptoms: 2 days    Is it typical sickle cell pain? Yes   Location: generalized, bilateral legs  Character: Sharp           Intensity: 9/10    Any radiation of pain, numbness, tingling, weakness, warmth, swelling, discoloration of arms or legs?No   Fever?No  Chest Pain Present: No   Shortness of breath: No     Other home therapies tried: HEAT/HEATING PAD and WARM BATH   Last home medication taken and when: 0600 oxycodone and all other meds  Any Refills Needed?: oxycodone refill needed, will be out this afternoon, pended to separate refill encounter    Does patient have transportation & length of time to get to clinic: No, 25 min    Recommendations:   Added to infusion wait list    If you do not hear from the infusion center by 2pm then you will not be able to get in for an infusion today. If symptoms worsen while waiting for call back, and/or you experience fever, chills, SOB, chest pain, cough, n/v, dizziness, numbness, swelling, discoloration of extremities, then seek emergency evaluation in Emergency Department.   Pt voiced understanding.      0720: [facility] can offer apt at 0900  Pt is requesting ride and SW is not here until 0800; will call pt and ask if she can get a ride.  0745: Called Jennifer to ask if she can get a ride and be here at 0900; Jennifer will check and call back to let this writer know.  0749: Spoke with Jennifer who stated she had a ride and cab would be at her house at 0815 to pick her up.    Updated [facility] charge nurse and message sent to [facility] to schedule.

## 2023-07-26 ENCOUNTER — PATIENT OUTREACH (OUTPATIENT)
Dept: ONCOLOGY | Facility: CLINIC | Age: 23
End: 2023-07-26
Payer: COMMERCIAL

## 2023-07-27 ENCOUNTER — NURSE TRIAGE (OUTPATIENT)
Dept: ONCOLOGY | Facility: CLINIC | Age: 23
End: 2023-07-27
Payer: COMMERCIAL

## 2023-07-27 ENCOUNTER — INFUSION THERAPY VISIT (OUTPATIENT)
Dept: TRANSPLANT | Facility: CLINIC | Age: 23
End: 2023-07-27
Attending: PEDIATRICS
Payer: COMMERCIAL

## 2023-07-27 VITALS
RESPIRATION RATE: 16 BRPM | TEMPERATURE: 98.6 F | DIASTOLIC BLOOD PRESSURE: 80 MMHG | HEART RATE: 94 BPM | OXYGEN SATURATION: 92 % | SYSTOLIC BLOOD PRESSURE: 121 MMHG

## 2023-07-27 DIAGNOSIS — G81.10 SPASTIC HEMIPLEGIA, UNSPECIFIED ETIOLOGY, UNSPECIFIED LATERALITY (H): ICD-10-CM

## 2023-07-27 DIAGNOSIS — D57.00 SICKLE CELL PAIN CRISIS (H): Primary | ICD-10-CM

## 2023-07-27 PROCEDURE — 96376 TX/PRO/DX INJ SAME DRUG ADON: CPT

## 2023-07-27 PROCEDURE — 96361 HYDRATE IV INFUSION ADD-ON: CPT

## 2023-07-27 PROCEDURE — 250N000011 HC RX IP 250 OP 636: Performed by: PEDIATRICS

## 2023-07-27 PROCEDURE — 96374 THER/PROPH/DIAG INJ IV PUSH: CPT

## 2023-07-27 PROCEDURE — 250N000013 HC RX MED GY IP 250 OP 250 PS 637: Performed by: PEDIATRICS

## 2023-07-27 PROCEDURE — 258N000003 HC RX IP 258 OP 636: Performed by: PEDIATRICS

## 2023-07-27 RX ORDER — HEPARIN SODIUM (PORCINE) LOCK FLUSH IV SOLN 100 UNIT/ML 100 UNIT/ML
5 SOLUTION INTRAVENOUS
Status: DISCONTINUED | OUTPATIENT
Start: 2023-07-27 | End: 2023-07-27 | Stop reason: HOSPADM

## 2023-07-27 RX ORDER — DIPHENHYDRAMINE HCL 25 MG
25 CAPSULE ORAL
Status: CANCELLED
Start: 2023-10-01

## 2023-07-27 RX ORDER — HEPARIN SODIUM,PORCINE 10 UNIT/ML
5 VIAL (ML) INTRAVENOUS
Status: CANCELLED | OUTPATIENT
Start: 2023-10-01

## 2023-07-27 RX ORDER — HEPARIN SODIUM (PORCINE) LOCK FLUSH IV SOLN 100 UNIT/ML 100 UNIT/ML
5 SOLUTION INTRAVENOUS
Status: CANCELLED | OUTPATIENT
Start: 2023-10-01

## 2023-07-27 RX ORDER — DIPHENHYDRAMINE HCL 25 MG
25 CAPSULE ORAL
Status: COMPLETED | OUTPATIENT
Start: 2023-07-27 | End: 2023-07-27

## 2023-07-27 RX ORDER — ONDANSETRON 4 MG/1
8 TABLET, FILM COATED ORAL
Status: CANCELLED
Start: 2023-10-01

## 2023-07-27 RX ADMIN — Medication 5 ML: at 14:59

## 2023-07-27 RX ADMIN — HYDROMORPHONE HYDROCHLORIDE 1 MG: 1 INJECTION, SOLUTION INTRAMUSCULAR; INTRAVENOUS; SUBCUTANEOUS at 14:35

## 2023-07-27 RX ADMIN — SODIUM CHLORIDE, POTASSIUM CHLORIDE, SODIUM LACTATE AND CALCIUM CHLORIDE 1000 ML: 600; 310; 30; 20 INJECTION, SOLUTION INTRAVENOUS at 12:32

## 2023-07-27 RX ADMIN — HYDROMORPHONE HYDROCHLORIDE 1 MG: 1 INJECTION, SOLUTION INTRAMUSCULAR; INTRAVENOUS; SUBCUTANEOUS at 13:38

## 2023-07-27 RX ADMIN — DIPHENHYDRAMINE HYDROCHLORIDE 25 MG: 25 CAPSULE ORAL at 14:35

## 2023-07-27 RX ADMIN — HYDROMORPHONE HYDROCHLORIDE 1 MG: 1 INJECTION, SOLUTION INTRAMUSCULAR; INTRAVENOUS; SUBCUTANEOUS at 12:37

## 2023-07-27 NOTE — PROGRESS NOTES
Infusion Nursing Note:  Jennifer Cervantes presents today for IV fluids and pain medication.    Patient seen by provider today: No   present during visit today: Not Applicable.    Note:   Pt received a liter of LR over 2 hours.    Pt received 1 mg dilaudid IV every hour for a total of 3 doses (3 mg total). Pt received 25 mg benadryl po for itching.      Intravenous Access:  Implanted Port.    Treatment Conditions:  Not Applicable.      Post Infusion Assessment:  Patient tolerated infusion without incident.  Blood return noted pre and post infusion.  Site patent and intact, free from redness, edema or discomfort.  No evidence of extravasations.  Access discontinued per protocol.       Discharge Plan:   Discharge instructions reviewed with: Patient.  Patient and/or family verbalized understanding of discharge instructions and all questions answered.  Patient discharged in stable condition accompanied by: self.  Departure Mode: Ambulatory.      Jamaica Napoles RN

## 2023-07-27 NOTE — TELEPHONE ENCOUNTER
Call from pt to confirm she is scheduled for IVF/pain meds at [facility]- writer informed she is scheduled at [facility] infusion at 1230.   Pt states she had not heard from SW on transportation, so she called and set this up herself. Writer informed SW no need to set up transportation, as pt has it it set up there and back.

## 2023-07-27 NOTE — TELEPHONE ENCOUNTER
Received notification that [facility] has availability today for infusion.  Contacted charge at [facility] infusion who states they would be able to take pt but only have time to provide 2 doses of dilaudid.  0921 Contacted pt to see if she would like appt at [facility] and informed her of them only able to provide 2 doses of dilaudid. Pt stated she would like appt.  0921 Request sent to  to assist with pt transportation.  0921 Charge nurse at [facility] notified pt confirmed appt.

## 2023-07-27 NOTE — TELEPHONE ENCOUNTER
Oncology Nurse Triage - Sickle Cell Pain Crisis:    Situation: Jennifer  calling about Sickle Cell Pain Crisis    Background:     Patient's last infusion was 07/24/23  Last clinic visit date:7/14/23 with Patricia Mantilla CNP  Next clinic visit date: 8/11/23 with Patricia Mantilla CNP  Does patient have active treatment plan?  Yes    (If pt has been seen in ED or infusion in last 3 days or no showed last clinic visit then does not meet protocol unless specified in pt therapy plan)    Assessment of Symptoms:  Onset/Duration of symptoms: 1 day    Is it typical sickle cell pain? Yes   Location: Thighs   Character: Sharp and Stabbing           Intensity: 9/10    Any radiation of pain, numbness, tingling, weakness, warmth, swelling, discoloration of extremities?No     Fever?No  (if yes max temperature recorded in last 24 hours):      Chest Pain Present: No     Shortness of breath: No     Other home therapies tried: HEAT/HEATING PAD and WARM BATH     Last home medication taken and when: Oxycodone 0600    Any Refills Needed?: No     Does patient have transportation & length of time to get to clinic: No       Grades for reference:     Grade 1 -   Patient has active treatment plan.   Patients last scheduled clinic appointment was attended  Patient has not been seen in infusion or ED in the last 3 days (clarify exclusions in therapy plans)   Afebrile   Typical sickle cell pain   Home medications have been tried   No other symptoms       Recommendations:   Added to infusion wait list for IVF/pain meds per protocol.      If you do not hear from the infusion center by 2pm then you will not be able to get in for an infusion today. If symptoms worsen while waiting for call back, and/or you experience fever, chills, SOB, chest pain, cough, n/v, dizziness, numbness, swelling, discoloration of extremities, then seek emergency evaluation in Emergency Department.     Please note, if you are late for your appt, you risk losing your infusion appt  as it may delay another patient's infusion who arrived on time.

## 2023-07-27 NOTE — TELEPHONE ENCOUNTER
Received call from Jennifer who requests coming to [facility] if at all possible.  [facility] able to offer 1230 apt today for pt.  Jennifer confirmed she would rather come to [facility] then be scheduled at [facility] d/t possibility of needing three doses of pain meds today. ([facility] could only give two doses.)  Message sent to SW to change transportation to [facility] at 1230 as pt is not scheduled at [facility].  Message sent to [facility] to schedule at [facility] at 1230 for IVF/pm, no labs, per ok of charge nurse.  Infusion charge nurse updated.

## 2023-07-27 NOTE — NURSING NOTE
"Oncology Rooming Note    July 27, 2023 2:28 PM   Jennifer Cervantes is a 23 year old female who presents for:    Chief Complaint   Patient presents with    Infusion     Add on IV fluids and pain medication. Hx sickle cell disease.     Initial Vitals: /80 (BP Location: Right arm, Patient Position: Sitting, Cuff Size: Adult Regular)   Pulse 94   Temp 98.6  F (37  C) (Oral)   Resp 16   LMP  (LMP Unknown)   SpO2 92%  Estimated body mass index is 24.68 kg/m  as calculated from the following:    Height as of 5/10/23: 1.626 m (5' 4\").    Weight as of 7/14/23: 65.2 kg (143 lb 12.8 oz). There is no height or weight on file to calculate BSA.  Data Unavailable Comment: Data Unavailable   No LMP recorded (lmp unknown). Patient has had an implant.  Allergies reviewed: Yes  Medications reviewed: Yes    Medications: Medication refills not needed today.  Pharmacy name entered into [facility]: [place], [place] - [address] [place] [phone]    Clinical concerns: none       Jamaica Napoles RN              "

## 2023-07-31 ENCOUNTER — INFUSION THERAPY VISIT (OUTPATIENT)
Dept: TRANSPLANT | Facility: CLINIC | Age: 23
End: 2023-07-31
Payer: COMMERCIAL

## 2023-07-31 ENCOUNTER — NURSE TRIAGE (OUTPATIENT)
Dept: ONCOLOGY | Facility: CLINIC | Age: 23
End: 2023-07-31

## 2023-07-31 ENCOUNTER — THERAPY VISIT (OUTPATIENT)
Dept: OCCUPATIONAL THERAPY | Facility: CLINIC | Age: 23
End: 2023-07-31
Payer: COMMERCIAL

## 2023-07-31 VITALS
RESPIRATION RATE: 16 BRPM | OXYGEN SATURATION: 97 % | SYSTOLIC BLOOD PRESSURE: 113 MMHG | HEART RATE: 79 BPM | TEMPERATURE: 97.8 F | DIASTOLIC BLOOD PRESSURE: 78 MMHG

## 2023-07-31 DIAGNOSIS — R25.2 SPASTICITY: ICD-10-CM

## 2023-07-31 DIAGNOSIS — D57.00 SICKLE CELL PAIN CRISIS (H): ICD-10-CM

## 2023-07-31 DIAGNOSIS — I69.351 HEMIPLEGIA AND HEMIPARESIS FOLLOWING CEREBRAL INFARCTION AFFECTING RIGHT DOMINANT SIDE (H): Primary | ICD-10-CM

## 2023-07-31 DIAGNOSIS — D57.00 SICKLE CELL PAIN CRISIS (H): Primary | ICD-10-CM

## 2023-07-31 DIAGNOSIS — G81.10 SPASTIC HEMIPLEGIA, UNSPECIFIED ETIOLOGY, UNSPECIFIED LATERALITY (H): ICD-10-CM

## 2023-07-31 PROCEDURE — 258N000003 HC RX IP 258 OP 636: Performed by: PEDIATRICS

## 2023-07-31 PROCEDURE — 97140 MANUAL THERAPY 1/> REGIONS: CPT | Mod: GO

## 2023-07-31 PROCEDURE — 96374 THER/PROPH/DIAG INJ IV PUSH: CPT

## 2023-07-31 PROCEDURE — 96376 TX/PRO/DX INJ SAME DRUG ADON: CPT

## 2023-07-31 PROCEDURE — 97763 ORTHC/PROSTC MGMT SBSQ ENC: CPT | Mod: GO

## 2023-07-31 PROCEDURE — 250N000011 HC RX IP 250 OP 636: Performed by: PEDIATRICS

## 2023-07-31 PROCEDURE — 96361 HYDRATE IV INFUSION ADD-ON: CPT

## 2023-07-31 PROCEDURE — 250N000013 HC RX MED GY IP 250 OP 250 PS 637: Performed by: PEDIATRICS

## 2023-07-31 RX ORDER — ONDANSETRON 4 MG/1
8 TABLET, FILM COATED ORAL
Status: CANCELLED
Start: 2023-10-01

## 2023-07-31 RX ORDER — OXYCODONE HYDROCHLORIDE 15 MG/1
15 TABLET ORAL EVERY 4 HOURS PRN
Qty: 20 TABLET | Refills: 0 | Status: SHIPPED | OUTPATIENT
Start: 2023-07-31 | End: 2023-08-07

## 2023-07-31 RX ORDER — HEPARIN SODIUM (PORCINE) LOCK FLUSH IV SOLN 100 UNIT/ML 100 UNIT/ML
5 SOLUTION INTRAVENOUS
Status: CANCELLED | OUTPATIENT
Start: 2023-10-01

## 2023-07-31 RX ORDER — HEPARIN SODIUM (PORCINE) LOCK FLUSH IV SOLN 100 UNIT/ML 100 UNIT/ML
5 SOLUTION INTRAVENOUS
Status: DISCONTINUED | OUTPATIENT
Start: 2023-07-31 | End: 2023-07-31 | Stop reason: HOSPADM

## 2023-07-31 RX ORDER — DIPHENHYDRAMINE HCL 25 MG
25 CAPSULE ORAL
Status: COMPLETED | OUTPATIENT
Start: 2023-07-31 | End: 2023-07-31

## 2023-07-31 RX ORDER — HEPARIN SODIUM,PORCINE 10 UNIT/ML
5 VIAL (ML) INTRAVENOUS
Status: CANCELLED | OUTPATIENT
Start: 2023-10-01

## 2023-07-31 RX ORDER — DIPHENHYDRAMINE HCL 25 MG
25 CAPSULE ORAL
Status: CANCELLED
Start: 2023-10-01

## 2023-07-31 RX ADMIN — SODIUM CHLORIDE, POTASSIUM CHLORIDE, SODIUM LACTATE AND CALCIUM CHLORIDE 1000 ML: 600; 310; 30; 20 INJECTION, SOLUTION INTRAVENOUS at 09:25

## 2023-07-31 RX ADMIN — HYDROMORPHONE HYDROCHLORIDE 1 MG: 1 INJECTION, SOLUTION INTRAMUSCULAR; INTRAVENOUS; SUBCUTANEOUS at 10:27

## 2023-07-31 RX ADMIN — HYDROMORPHONE HYDROCHLORIDE 1 MG: 1 INJECTION, SOLUTION INTRAMUSCULAR; INTRAVENOUS; SUBCUTANEOUS at 11:27

## 2023-07-31 RX ADMIN — DIPHENHYDRAMINE HYDROCHLORIDE 25 MG: 25 CAPSULE ORAL at 11:31

## 2023-07-31 RX ADMIN — HYDROMORPHONE HYDROCHLORIDE 1 MG: 1 INJECTION, SOLUTION INTRAMUSCULAR; INTRAVENOUS; SUBCUTANEOUS at 09:26

## 2023-07-31 RX ADMIN — Medication 5 ML: at 12:04

## 2023-07-31 ASSESSMENT — PAIN SCALES - GENERAL: PAINLEVEL: EXTREME PAIN (9)

## 2023-07-31 NOTE — TELEPHONE ENCOUNTER
Oncology Nurse Triage - Sickle Cell Pain Crisis:  Situation: Jennifer  calling about Sickle Cell Pain Crisis, requesting to be added on for IV fluids and pain medicine    Background:   Patient's last infusion was 7/27/23  Last clinic visit date:71423 aida/ Patricia   Does patient have active treatment plan?  Yes    Assessment of Symptoms:  Onset/Duration of symptoms: 2 day    Is it typical sickle cell pain? Yes   Location: arms  Character: Sharp, painful to use arms           Intensity: 8/10    Any radiation of pain, numbness, tingling, weakness, warmth, swelling, discoloration of arms or legs?No     Fever?No    Chest Pain Present: No     Shortness of breath: No     Other home therapies tried: HEAT/HEATING PAD, warm baths    Last home medication taken and when: 0600 Oxycodone    Any Refills Needed?: Yes - Oxycodone, will be out this afternoon, see refill encounter.     Does patient have transportation & length of time to get to clinic: No - pt is on her way to clinic now for 8am appt. If can get her in before ride home, will not need transportation. Otherwise, will need set up if pt leaves prior.    Recommendations:   Added to infusion wait list   Per Allison, charge RN in [facility], can take pt this morning for IVF/pain meds. Pt accepting of 0930 time.   0711 message sent to scheduling.     If you do not hear from the infusion center by 2pm then you will not be able to get in for an infusion today. If symptoms worsen while waiting for call back, and/or you experience fever, chills, SOB, chest pain, cough, n/v, dizziness, numbness, swelling, discoloration of extremities, then seek emergency evaluation in Emergency Department.     Please note, if you are late for your appt, you risk losing your infusion appt as it may delay another patient's infusion who arrived on time.

## 2023-07-31 NOTE — TELEPHONE ENCOUNTER
Narcotic Refill Request  Medication(s) requested:  Oxycodone 15mg tab  Person Requesting Refill: Jennifer  What pain is the medication treating: sickle cell pain  How is the medication being taken?: 1 tab q4h PRN  Does pt have enough for today? Will be out this afternoon  Is pain being adequately controlled on the current regimen?: most of time  Experiencing any side effects from medication?:  no    Date of most recent appointment:  7/14/23 w/ Patricia Mantilla  Any No Show Visits: no  Next appointment:   8/11/23  Last fill date and by whom:  Dr. Duncan on 7/24/23    Reviewed: no access    Send to provider: Patricia Mantilla

## 2023-07-31 NOTE — NURSING NOTE
"Oncology Rooming Note    July 31, 2023 9:35 AM   Jennifer Cervantes is a 23 year old female who presents for:    Chief Complaint   Patient presents with    Infusion     Add on IV fluids and pain medications. Hx sickle cell disease     Initial Vitals: /78 (BP Location: Right arm, Patient Position: Sitting, Cuff Size: Adult Regular)   Pulse 79   Temp 97.8  F (36.6  C) (Oral)   Resp 16   LMP  (LMP Unknown)   SpO2 97%  Estimated body mass index is 24.68 kg/m  as calculated from the following:    Height as of 5/10/23: 1.626 m (5' 4\").    Weight as of 7/14/23: 65.2 kg (143 lb 12.8 oz). There is no height or weight on file to calculate BSA.  Extreme Pain (9) Comment: Data Unavailable   No LMP recorded (lmp unknown). Patient has had an implant.  Allergies reviewed: Yes  Medications reviewed: Yes    Medications: Medication refills not needed today.  Pharmacy name entered into [facility]: [facility] [place], [place] - [address] [place] [address] [phone]    Clinical concerns: none       Jamaica Napoles RN              "

## 2023-07-31 NOTE — LETTER
Jennifer NEWMAN Billy  [address]  [facility] [address]        August 3, 2023      Tavon Rodriguez,    Just letting you know we schedule your next set of Botox injections for October 12th.          Oct 12, 2023  9:00 AM  (Arrive by 8:45 AM)  Neurotoxin with Katherine Pierce MD  [facility] ([facility] ) [phone]       Mandie  Nurse Coordinator  Dr.Florence Pierce's office  Physical Medicine and Rehabilitation in Oncology   [phone]'

## 2023-07-31 NOTE — PROGRESS NOTES
Infusion Nursing Note:  Jennifer Cervantes presents today for IV fluids and pain medications.    Patient seen by provider today: No   present during visit today: Not Applicable.    Note:   Pt received a liter of LR over 2 hours.    Pt received dilaudid 1 mg IV every hour for 3 doses (3 mg dilaudid total). Pt received 25 mg po benadryl for itching.      Intravenous Access:  Implanted port    Treatment Conditions:  Not Applicable.      Post Infusion Assessment:  Patient tolerated infusion without incident.  Blood return noted pre and post infusion.  Site patent and intact, free from redness, edema or discomfort.  No evidence of extravasations.  Access discontinued per protocol.       Discharge Plan:   Prescription refill for oxycodone brought up to patient by pharmacy.  Discharge instructions reviewed with: Patient.  Patient and/or family verbalized understanding of discharge instructions and all questions answered.  Patient discharged in stable condition accompanied by: self.  Departure Mode: Ambulatory.      Jamaica Napoles RN

## 2023-08-02 ENCOUNTER — NURSE TRIAGE (OUTPATIENT)
Dept: ONCOLOGY | Facility: CLINIC | Age: 23
End: 2023-08-02
Payer: COMMERCIAL

## 2023-08-02 ENCOUNTER — INFUSION THERAPY VISIT (OUTPATIENT)
Dept: INFUSION THERAPY | Facility: CLINIC | Age: 23
End: 2023-08-02
Attending: PEDIATRICS
Payer: COMMERCIAL

## 2023-08-02 ENCOUNTER — ANCILLARY PROCEDURE (OUTPATIENT)
Dept: GENERAL RADIOLOGY | Facility: CLINIC | Age: 23
End: 2023-08-02
Attending: PEDIATRICS
Payer: COMMERCIAL

## 2023-08-02 ENCOUNTER — PATIENT OUTREACH (OUTPATIENT)
Dept: CARE COORDINATION | Facility: CLINIC | Age: 23
End: 2023-08-02
Payer: COMMERCIAL

## 2023-08-02 VITALS
TEMPERATURE: 98 F | OXYGEN SATURATION: 85 % | HEART RATE: 86 BPM | DIASTOLIC BLOOD PRESSURE: 70 MMHG | SYSTOLIC BLOOD PRESSURE: 114 MMHG | RESPIRATION RATE: 16 BRPM

## 2023-08-02 DIAGNOSIS — G81.10 SPASTIC HEMIPLEGIA, UNSPECIFIED ETIOLOGY, UNSPECIFIED LATERALITY (H): ICD-10-CM

## 2023-08-02 DIAGNOSIS — R09.02 HYPOXIA: ICD-10-CM

## 2023-08-02 DIAGNOSIS — D57.00 SICKLE CELL PAIN CRISIS (H): Primary | ICD-10-CM

## 2023-08-02 LAB
ALBUMIN SERPL BCG-MCNC: 4.2 G/DL (ref 3.5–5.2)
ALP SERPL-CCNC: 65 U/L (ref 35–104)
ALT SERPL W P-5'-P-CCNC: 14 U/L (ref 0–50)
ANION GAP SERPL CALCULATED.3IONS-SCNC: 9 MMOL/L (ref 7–15)
AST SERPL W P-5'-P-CCNC: 40 U/L (ref 0–45)
BASOPHILS # BLD AUTO: 0.2 10E3/UL (ref 0–0.2)
BASOPHILS NFR BLD AUTO: 2 %
BILIRUB SERPL-MCNC: 3.7 MG/DL
BUN SERPL-MCNC: 5.1 MG/DL (ref 6–20)
CALCIUM SERPL-MCNC: 8.9 MG/DL (ref 8.6–10)
CHLORIDE SERPL-SCNC: 107 MMOL/L (ref 98–107)
CREAT SERPL-MCNC: 0.58 MG/DL (ref 0.51–0.95)
DEPRECATED HCO3 PLAS-SCNC: 23 MMOL/L (ref 22–29)
EOSINOPHIL # BLD AUTO: 0.4 10E3/UL (ref 0–0.7)
EOSINOPHIL NFR BLD AUTO: 3 %
ERYTHROCYTE [DISTWIDTH] IN BLOOD BY AUTOMATED COUNT: 25.5 % (ref 10–15)
GFR SERPL CREATININE-BSD FRML MDRD: >90 ML/MIN/1.73M2
GLUCOSE SERPL-MCNC: 85 MG/DL (ref 70–99)
HCT VFR BLD AUTO: 17 % (ref 35–47)
HGB BLD-MCNC: 6.1 G/DL (ref 11.7–15.7)
IMM GRANULOCYTES # BLD: 0.1 10E3/UL
IMM GRANULOCYTES NFR BLD: 1 %
LYMPHOCYTES # BLD AUTO: 2.1 10E3/UL (ref 0.8–5.3)
LYMPHOCYTES NFR BLD AUTO: 20 %
MCH RBC QN AUTO: 27.9 PG (ref 26.5–33)
MCHC RBC AUTO-ENTMCNC: 35.9 G/DL (ref 31.5–36.5)
MCV RBC AUTO: 78 FL (ref 78–100)
MONOCYTES # BLD AUTO: 0.9 10E3/UL (ref 0–1.3)
MONOCYTES NFR BLD AUTO: 8 %
NEUTROPHILS # BLD AUTO: 6.7 10E3/UL (ref 1.6–8.3)
NEUTROPHILS NFR BLD AUTO: 66 %
NRBC # BLD AUTO: 0.2 10E3/UL
NRBC BLD AUTO-RTO: 2 /100
PLATELET # BLD AUTO: 445 10E3/UL (ref 150–450)
POTASSIUM SERPL-SCNC: 4 MMOL/L (ref 3.4–5.3)
PROT SERPL-MCNC: 7.1 G/DL (ref 6.4–8.3)
RBC # BLD AUTO: 2.19 10E6/UL (ref 3.8–5.2)
SODIUM SERPL-SCNC: 139 MMOL/L (ref 136–145)
WBC # BLD AUTO: 10.2 10E3/UL (ref 4–11)

## 2023-08-02 PROCEDURE — 96361 HYDRATE IV INFUSION ADD-ON: CPT

## 2023-08-02 PROCEDURE — 250N000011 HC RX IP 250 OP 636: Performed by: PEDIATRICS

## 2023-08-02 PROCEDURE — 36591 DRAW BLOOD OFF VENOUS DEVICE: CPT

## 2023-08-02 PROCEDURE — 96374 THER/PROPH/DIAG INJ IV PUSH: CPT

## 2023-08-02 PROCEDURE — 80053 COMPREHEN METABOLIC PANEL: CPT

## 2023-08-02 PROCEDURE — 96376 TX/PRO/DX INJ SAME DRUG ADON: CPT

## 2023-08-02 PROCEDURE — 71046 X-RAY EXAM CHEST 2 VIEWS: CPT | Performed by: RADIOLOGY

## 2023-08-02 PROCEDURE — 85025 COMPLETE CBC W/AUTO DIFF WBC: CPT

## 2023-08-02 PROCEDURE — 258N000003 HC RX IP 258 OP 636: Performed by: PEDIATRICS

## 2023-08-02 RX ORDER — HEPARIN SODIUM,PORCINE 10 UNIT/ML
5 VIAL (ML) INTRAVENOUS
Status: CANCELLED | OUTPATIENT
Start: 2023-10-01

## 2023-08-02 RX ORDER — HEPARIN SODIUM,PORCINE 10 UNIT/ML
5 VIAL (ML) INTRAVENOUS
Status: DISCONTINUED | OUTPATIENT
Start: 2023-08-02 | End: 2023-08-02 | Stop reason: HOSPADM

## 2023-08-02 RX ORDER — DIPHENHYDRAMINE HCL 25 MG
25 CAPSULE ORAL
Status: CANCELLED
Start: 2023-10-01

## 2023-08-02 RX ORDER — HEPARIN SODIUM (PORCINE) LOCK FLUSH IV SOLN 100 UNIT/ML 100 UNIT/ML
5 SOLUTION INTRAVENOUS
Status: DISCONTINUED | OUTPATIENT
Start: 2023-08-02 | End: 2023-08-02 | Stop reason: HOSPADM

## 2023-08-02 RX ORDER — ONDANSETRON 4 MG/1
8 TABLET, FILM COATED ORAL
Status: CANCELLED
Start: 2023-10-01

## 2023-08-02 RX ORDER — HEPARIN SODIUM (PORCINE) LOCK FLUSH IV SOLN 100 UNIT/ML 100 UNIT/ML
5 SOLUTION INTRAVENOUS
Status: CANCELLED | OUTPATIENT
Start: 2023-10-01

## 2023-08-02 RX ADMIN — Medication 5 ML: at 16:52

## 2023-08-02 RX ADMIN — HYDROMORPHONE HYDROCHLORIDE 1 MG: 1 INJECTION, SOLUTION INTRAMUSCULAR; INTRAVENOUS; SUBCUTANEOUS at 15:37

## 2023-08-02 RX ADMIN — HYDROMORPHONE HYDROCHLORIDE 1 MG: 1 INJECTION, SOLUTION INTRAMUSCULAR; INTRAVENOUS; SUBCUTANEOUS at 16:31

## 2023-08-02 RX ADMIN — HYDROMORPHONE HYDROCHLORIDE 1 MG: 1 INJECTION, SOLUTION INTRAMUSCULAR; INTRAVENOUS; SUBCUTANEOUS at 14:32

## 2023-08-02 RX ADMIN — SODIUM CHLORIDE, POTASSIUM CHLORIDE, SODIUM LACTATE AND CALCIUM CHLORIDE 1000 ML: 600; 310; 30; 20 INJECTION, SOLUTION INTRAVENOUS at 14:35

## 2023-08-02 NOTE — PROGRESS NOTES
Infusion Nursing Note:  Jennifer Cervantes presents today for Patient presents with:  Infusion: Fluids, dilaudid    .    Patient seen by provider today: No   present during visit today: Not Applicable.    Note: Patient identification verified by name and date of birth.  Assessment completed.  Vitals recorded in Doc Flowsheets.  Patient was provided with education regarding medication/procedure and possible side effects. Patient verbalized understanding.    During [facility]'s [facility] appointment, orders from Dr. Duncan were completed.  Frequency: PRN    Patient's O2 87% upon admission. Rechecked and sill < 90%. Placed on 2 L of O2 with Sats in the upper 90's (obtained verbal order for the O2 from Dr. Duncan).  will monitor throughout infusion. O2 sats dropped to 85% off of O2. Patient denies SOB, Chest pain and OW vital stable. Notified Dr. Duncan. CBC, CMP, CXR ordered. Patient will leave after and MD will follow up on results.       Intravenous Access:  Peripheral IV placed.    Post Infusion Assessment:  Patient tolerated infusion without incident.  Blood return noted pre and post infusion.  Site patent and intact, free from redness, edema or discomfort.  No evidence of extravasations.  Access discontinued per protocol.       Discharge Plan:   Discharge instructions reviewed with: Patient.  Patient and/or family verbalized understanding of discharge instructions and all questions answered.  Patient discharged in stable condition accompanied by: self. She states she has a  to take her home.\  Departure Mode: Ambulatory.    /70 (BP Location: Left arm, Patient Position: Semi-Short's, Cuff Size: Adult Regular)   Pulse 86   Temp 98  F (36.7  C) (Oral)   Resp 16   LMP  (LMP Unknown)   SpO2 (!) 85%     Elena Kelly RN

## 2023-08-02 NOTE — TELEPHONE ENCOUNTER
Oncology Nurse Triage - Sickle Cell Pain Crisis:    Situation: Jennifer  calling about Sickle Cell Pain Crisis, requesting to be added on for IV fluids and pain medicine    Background:     Patient's last infusion was 7/31/23  Last clinic visit date:7/14/23 Patricia Mantilla CNP  Does patient have active treatment plan?  Yes      Assessment of Symptoms:  Onset/Duration of symptoms: 1 day    Is it typical sickle cell pain? Yes   Location: both legs  Character: Sharp           Intensity: 9/10    Any radiation of pain, numbness, tingling, weakness, warmth, swelling, discoloration of arms or legs?No     Fever?No    Chest Pain Present: No     Shortness of breath: No     Other home therapies tried: HEAT/HEATING PAD  warm bath    Last home medication taken and when: Oxycodone taken around 9am and Tylenol    Any Refills Needed?: No     Does patient have transportation & length of time to get to clinic: No   Could get ride to clinic for last minute opening but would need ride home assistance.       Recommendations:   Meets protocol    1304 Appt available for 2:00pm [facility] pending transportation, Jennifer is in agreement with plan.     1305 UDAY Fernando aware of ride needs and working on assisting pt.     1307 Scheduling request sent to schedule.       Please note, if you are late for your appt, you risk losing your infusion appt as it may delay another patient's infusion who arrived on time.

## 2023-08-02 NOTE — PROGRESS NOTES
Social Work - Transportation  [facility]    Data/Intervention:  Patient Name: Jennifer Cervantes Goes By: Jennifer    /Age: 2000 (23 year old)    Referral From: Ange Abbasi RN  Reason for Referral:  support requested for patient transportation needs for today's appointment at 2 pm.  Assessment:  called [employer] to arrange ride through patient's insurance. [employer] arranged  for patient from home with [employer]. Patient will need to call [phone] when ready for return ride home.  Plan: Patient is aware of the transportation plan.  available to assist with any other needs.  CARLOS Chavez,[facility]  Hematology/Oncology Social Worker  Phone:[phone] Pager: [phone]

## 2023-08-07 ENCOUNTER — NURSE TRIAGE (OUTPATIENT)
Dept: ONCOLOGY | Facility: CLINIC | Age: 23
End: 2023-08-07
Payer: COMMERCIAL

## 2023-08-07 ENCOUNTER — PATIENT OUTREACH (OUTPATIENT)
Dept: CARE COORDINATION | Facility: CLINIC | Age: 23
End: 2023-08-07
Payer: COMMERCIAL

## 2023-08-07 ENCOUNTER — INFUSION THERAPY VISIT (OUTPATIENT)
Dept: ONCOLOGY | Facility: CLINIC | Age: 23
End: 2023-08-07
Attending: PEDIATRICS
Payer: COMMERCIAL

## 2023-08-07 VITALS — TEMPERATURE: 99.6 F | OXYGEN SATURATION: 94 % | SYSTOLIC BLOOD PRESSURE: 111 MMHG | DIASTOLIC BLOOD PRESSURE: 74 MMHG

## 2023-08-07 DIAGNOSIS — D57.00 SICKLE CELL PAIN CRISIS (H): ICD-10-CM

## 2023-08-07 DIAGNOSIS — D57.00 SICKLE CELL PAIN CRISIS (H): Primary | ICD-10-CM

## 2023-08-07 DIAGNOSIS — G81.10 SPASTIC HEMIPLEGIA, UNSPECIFIED ETIOLOGY, UNSPECIFIED LATERALITY (H): ICD-10-CM

## 2023-08-07 PROCEDURE — 258N000003 HC RX IP 258 OP 636: Performed by: PEDIATRICS

## 2023-08-07 PROCEDURE — 250N000011 HC RX IP 250 OP 636: Mod: JZ | Performed by: PEDIATRICS

## 2023-08-07 PROCEDURE — 96376 TX/PRO/DX INJ SAME DRUG ADON: CPT

## 2023-08-07 PROCEDURE — 96361 HYDRATE IV INFUSION ADD-ON: CPT

## 2023-08-07 PROCEDURE — 96374 THER/PROPH/DIAG INJ IV PUSH: CPT

## 2023-08-07 PROCEDURE — 250N000013 HC RX MED GY IP 250 OP 250 PS 637: Performed by: PEDIATRICS

## 2023-08-07 RX ORDER — ONDANSETRON 4 MG/1
8 TABLET, FILM COATED ORAL
Status: CANCELLED
Start: 2023-10-01

## 2023-08-07 RX ORDER — HEPARIN SODIUM,PORCINE 10 UNIT/ML
5 VIAL (ML) INTRAVENOUS
Status: CANCELLED | OUTPATIENT
Start: 2023-10-01

## 2023-08-07 RX ORDER — HEPARIN SODIUM (PORCINE) LOCK FLUSH IV SOLN 100 UNIT/ML 100 UNIT/ML
5 SOLUTION INTRAVENOUS
Status: CANCELLED | OUTPATIENT
Start: 2023-10-01

## 2023-08-07 RX ORDER — DIPHENHYDRAMINE HCL 25 MG
25 CAPSULE ORAL
Status: COMPLETED | OUTPATIENT
Start: 2023-08-07 | End: 2023-08-07

## 2023-08-07 RX ORDER — HEPARIN SODIUM (PORCINE) LOCK FLUSH IV SOLN 100 UNIT/ML 100 UNIT/ML
5 SOLUTION INTRAVENOUS
Status: DISCONTINUED | OUTPATIENT
Start: 2023-08-07 | End: 2023-08-07 | Stop reason: HOSPADM

## 2023-08-07 RX ORDER — OXYCODONE HYDROCHLORIDE 15 MG/1
15 TABLET ORAL EVERY 4 HOURS PRN
Qty: 20 TABLET | Refills: 0 | Status: SHIPPED | OUTPATIENT
Start: 2023-08-07 | End: 2023-08-11

## 2023-08-07 RX ORDER — DIPHENHYDRAMINE HCL 25 MG
25 CAPSULE ORAL
Status: CANCELLED
Start: 2023-10-01

## 2023-08-07 RX ADMIN — HYDROMORPHONE HYDROCHLORIDE 1 MG: 1 INJECTION, SOLUTION INTRAMUSCULAR; INTRAVENOUS; SUBCUTANEOUS at 09:14

## 2023-08-07 RX ADMIN — HYDROMORPHONE HYDROCHLORIDE 1 MG: 1 INJECTION, SOLUTION INTRAMUSCULAR; INTRAVENOUS; SUBCUTANEOUS at 10:16

## 2023-08-07 RX ADMIN — Medication 5 ML: at 11:20

## 2023-08-07 RX ADMIN — SODIUM CHLORIDE, POTASSIUM CHLORIDE, SODIUM LACTATE AND CALCIUM CHLORIDE 1000 ML: 600; 310; 30; 20 INJECTION, SOLUTION INTRAVENOUS at 08:56

## 2023-08-07 RX ADMIN — DIPHENHYDRAMINE HYDROCHLORIDE 25 MG: 25 CAPSULE ORAL at 10:16

## 2023-08-07 RX ADMIN — HYDROMORPHONE HYDROCHLORIDE 1 MG: 1 INJECTION, SOLUTION INTRAMUSCULAR; INTRAVENOUS; SUBCUTANEOUS at 11:20

## 2023-08-07 NOTE — PROGRESS NOTES
Infusion Nursing Note:  Jennifer Cervantes presents today for add on IVFs and pain meds.    Patient seen by provider today: No   present during visit today: Not Applicable.    Note: Patient presents to the infusion center today in 10/10 muscle pain in her bilateral lower legs. Patient last took Oxycodone at 6 am this morning with no relief. Denies any other symptoms or concerns. No fevers, chills, chest pain or shortness of breath.    7/10 after 1st dose.  5/10 after 2nd dose.  4/10 after 3rd dose.    After three doses of IV Dilaudid patient states her pain is controlled enough to go home. Patient has a friend coming to pick her up and bring her home.     Intravenous Access:  Implanted Port.    Treatment Conditions:  Not Applicable.    Post Infusion Assessment:  Patient tolerated infusion without incident.  Blood return noted pre and post infusion.  No evidence of extravasations.  Access discontinued per protocol.     Discharge Plan:   Prescription refills requested for oxycodone. (Patient is currently communicating with Dr. Duncan regarding this refill)  Discharge instructions reviewed with: Patient.  Patient and/or family verbalized understanding of discharge instructions and all questions answered.  AVS to patient via [employer].  Patient will return 8/11 for next appointment.   Patient discharged in stable condition accompanied by: self.  Departure Mode: Ambulatory.      Nichole Vasquez RN

## 2023-08-07 NOTE — PATIENT INSTRUCTIONS
[facility] Triage and after hours / weekends / holidays:  [phone]    Please call the triage or after hours line if you experience a temperature greater than or equal to 100.4, shaking chills, have uncontrolled nausea, vomiting and/or diarrhea, dizziness, shortness of breath, chest pain, bleeding, unexplained bruising, or if you have any other new/concerning symptoms, questions or concerns.      If you are having any concerning symptoms or wish to speak to a provider before your next infusion visit, please call triage to notify them so we can adequately serve you.     If you need a refill on a narcotic prescription or other medication, please call before your infusion appointment.

## 2023-08-07 NOTE — TELEPHONE ENCOUNTER
Jennifer calling, finishing up in clinic and needs oxycodone refill now.     [phone] Paged Dr. Duncan     checked

## 2023-08-07 NOTE — PROGRESS NOTES
Social Work - Transportation  [facility]    Data/Intervention:  Patient Name: Jennifer Cervantes Goes By: Jennifer    /Age: 2000 (23 year old)    [facility]  Reason for Referral:  support requested for patient transportation needs for today's infusion appointment.  Assessment:  called [employer] to arrange ride through patient's insurance. [employer] arranged will call ride home with [employer]. Patient will need to call [phone] when ready for return ride home.  Plan: Patient is aware of the transportation plan.  available to assist with any other needs.    CARLOS Chavez,[facility]  Hematology/Oncology Social Worker  Phone:[phone] Pager: [phone]

## 2023-08-07 NOTE — TELEPHONE ENCOUNTER
Narcotic Refill Request    Medication(s) requested:  oxycodone   Person Requesting Refill: Jennifer   What pain is the medication treating: sickle cell pain   How is the medication being taken?:1 tab every 6 hours   Does pt have enough for today? Will be out today   Is pain being adequately controlled on the current regimen?: yes   Experiencing any side effects from medication?: none    Date of most recent appointment:  7/14/23 Patricia Mantilla   Any No Show Visits: no   Next appointment:   8/11/23  Patricia Mantilla   Last fill date and by whom:  7/31/23  Patricia Mantilla    Reviewed:  no access     Send to provider:  Dr Duncan

## 2023-08-07 NOTE — TELEPHONE ENCOUNTER
Oncology Nurse Triage - Sickle Cell Pain Crisis:    Situation: Jennifer  calling about Sickle Cell Pain Crisis, requesting to be added on for IV fluids and pain medicine    Background:     Patient's last infusion was 8/2/23   Last clinic visit date:7/14/23 Patricia Mantilla   Does patient have active treatment plan?  Yes      Assessment of Symptoms:  Onset/Duration of symptoms: 3 day    Is it typical sickle cell pain? Yes   Location: legs   Character: Sharp           Intensity: 10/10    Any radiation of pain, numbness, tingling, weakness, warmth, swelling, discoloration of arms or legs?No     Fever?No  (if yes max temperature recorded in last 24 hours):      Chest Pain Present: No     Shortness of breath: No     Other home therapies tried: HEAT/HEATING PAD and WARM BATH     Last home medication taken and when: 6:00am oxycodone along with other meds     Any Refills Needed?: Yes     Does patient have transportation & length of time to get to clinic: No Needs help with transportation         Recommendations:     Placed on infusion call list   7:30am:  an 8:30 appt offered to Jennifer, she is trying to arrange transportation. She will call back within 10 minutes to let us know if she can make it.     Jennifer will be able to come in at 8:30     Message sent to [facility] to schedule appointment     Message sent to social work to arrange transportation home.     If you do not hear from the infusion center by 2pm then you will not be able to get in for an infusion today. If symptoms worsen while waiting for call back, and/or you experience fever, chills, SOB, chest pain, cough, n/v, dizziness, numbness, swelling, discoloration of extremities, then seek emergency evaluation in Emergency Department.     Please note, if you are late for your appt, you risk losing your infusion appt as it may delay another patient's infusion who arrived on time.

## 2023-08-10 RX ORDER — HEPARIN SODIUM (PORCINE) LOCK FLUSH IV SOLN 100 UNIT/ML 100 UNIT/ML
5 SOLUTION INTRAVENOUS
Status: CANCELLED | OUTPATIENT
Start: 2023-08-11

## 2023-08-10 RX ORDER — DIPHENHYDRAMINE HYDROCHLORIDE 50 MG/ML
50 INJECTION INTRAMUSCULAR; INTRAVENOUS
Status: CANCELLED
Start: 2023-08-11

## 2023-08-10 RX ORDER — ALBUTEROL SULFATE 90 UG/1
1-2 AEROSOL, METERED RESPIRATORY (INHALATION)
Status: CANCELLED
Start: 2023-08-11

## 2023-08-10 RX ORDER — HEPARIN SODIUM,PORCINE 10 UNIT/ML
5 VIAL (ML) INTRAVENOUS
Status: CANCELLED | OUTPATIENT
Start: 2023-10-01

## 2023-08-10 RX ORDER — HEPARIN SODIUM,PORCINE 10 UNIT/ML
5 VIAL (ML) INTRAVENOUS
Status: CANCELLED | OUTPATIENT
Start: 2023-08-11

## 2023-08-10 RX ORDER — METHYLPREDNISOLONE SODIUM SUCCINATE 125 MG/2ML
125 INJECTION, POWDER, LYOPHILIZED, FOR SOLUTION INTRAMUSCULAR; INTRAVENOUS
Status: CANCELLED
Start: 2023-08-11

## 2023-08-10 RX ORDER — HEPARIN SODIUM (PORCINE) LOCK FLUSH IV SOLN 100 UNIT/ML 100 UNIT/ML
5 SOLUTION INTRAVENOUS
Status: CANCELLED | OUTPATIENT
Start: 2023-10-01

## 2023-08-10 RX ORDER — DIPHENHYDRAMINE HCL 25 MG
25 CAPSULE ORAL
Status: CANCELLED
Start: 2023-10-01

## 2023-08-10 RX ORDER — ONDANSETRON 4 MG/1
8 TABLET, FILM COATED ORAL
Status: CANCELLED
Start: 2023-10-01

## 2023-08-10 RX ORDER — EPINEPHRINE 1 MG/ML
0.3 INJECTION, SOLUTION INTRAMUSCULAR; SUBCUTANEOUS EVERY 5 MIN PRN
Status: CANCELLED | OUTPATIENT
Start: 2023-08-11

## 2023-08-10 RX ORDER — MEPERIDINE HYDROCHLORIDE 25 MG/ML
25 INJECTION INTRAMUSCULAR; INTRAVENOUS; SUBCUTANEOUS EVERY 30 MIN PRN
Status: CANCELLED | OUTPATIENT
Start: 2023-08-11

## 2023-08-10 RX ORDER — ALBUTEROL SULFATE 0.83 MG/ML
2.5 SOLUTION RESPIRATORY (INHALATION)
Status: CANCELLED | OUTPATIENT
Start: 2023-08-11

## 2023-08-10 NOTE — PROGRESS NOTES
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 She had multiple thromboembolic events in 2021 despite adherent anticoagulation use (though warfarin was perpetually low) and there are concerns for chronic thromboembolic disease but did not have pulmonary HTN on a November 2021 cath. She is maintained on chronic PO opioids and twice-weekly infusion visits (since 1/24/22) but has been able to be maintained on this regimen and has stayed out of the ED most of the time with even rarer admissions.     Interval History:  Jennifer is seen today for routine sidney and monthly follow-up.  Overall she is felt well recently although is having increased pain today.  She will receive IV fluids and pain medication along with her sidney.  She notes that she occasionally experiences an influx of pain immediately following her sidney infusions although this does not happen every month.  If it does occur, the pain is typically present in the bottom half of her body since her hips radiating to her legs.  There was 1 episode where she recalls difficulty walking after the infusion due to pain although the last 3 months there have been no issues following infusion.  She has been interviewing for jobs which she is very excited about.  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  She feels very hopeful that working will allow for additional distraction from her pain.  She also has a goal to decrease her  "infusion center visits while working.  Things at home with her family are stable.  She does desire to further decrease her oxycodone today.    Sickle Cell Disease Comprehensive Checklist  Bone Health/Avascular Necrosis Screening/Symptoms (each visit): no new concerns today  Leg Ulcer evaluation (every visit): none  Hypertension (every visit):stable 3/10/23  Last pulmonary evaluation (asthma, AMAN, pulm HTN): 9/28/22  Stroke/silent cerebral infarct Hx (Y/N): Yes TIA ~2014, first event ~age 1 with full stroke and R sided weakness  Last PCP Visit: 3/6/23  Vaccines:  PCV13: 5/13/19  Pneumovax (PPSV23): 3/04, 10/09, 7/12/19 (next due 7/2024)  Menactra: 4/2010, 9/2015 (MCV done 8/16/21)  Influenza: 11/17/2022   Audiology (chelation): done 6/2020, normal.. However, on 6/7, \"While there is no significant change in grade on the CTCAE 4.03 Scale, there were hearing changes bilaterally for both standard and extended high frequency audiometry.  Will need to determine if an ENT consult is advised due to the asymmetry in extended high frequency testing.\"     Plan last reviewed with patient: 7/11/22    Patient background: 21 yo F, enjoys movies and kids though there are times where she does not really want to talk to people. Does not have a lot of social support at home.     Sickle Cell Disease History  Primary Hematologist Team: Jose Rafael Duncan  PCP: none  Genotype: SS  Acute Pain Crisis Treatment: (avoiding IV opioids for now, which she has agreed to)  ER   Oxycodone 15-20 mg x1  Ketamine 4mg IV x 2--helps with opioid sparing  Toradol 15 mg IV x1   Maintenance IV fluids with LR  Other: Zofran 8 mg IV PRN nausea  Inpatient:  Home oxycodone/Oxycontin regimen, though home oxycodone dosing could be increased to 20 mg to start  PCA plan:   None for now  Other Medications: Zofran  She has had success with ketamine starting at 4mg/h and advancing only to 6mg/h, as 8mg/h made her feel quite poorly..  ASA  Supportive Care: Docusate, " Senna  Chronic Pain Medications:    Home regimen Oxycontin 10 mg q12h, oxycodone 15 mg p.o. q.4-6 hours p.r.n. breakthrough pain.  She also continues on Voltaren gel, and Zoloft among other medications.    -Also benefits from mental health visits, acupuncture  Baseline Hemoglobin: 7 g/dl without chronic transfusions  Hydroxyurea use: Yes  H/O blood transfusions: Yes, several (iron overload) Most recent 11/20/2021  H/O Transfusion Reactions: no  Antibodies:none  H/O Acute Chest Syndrome: Yes  Last episode:9/05/22 (previously 4/26/21, 10/2019)   ICU/intubation: not with 9/2022 admission  H/O Stroke: Yes (managed with chronic transfusions in the past, stopped late Spring 2020)  H/O VTE: Yes (2/2021)  H/O Cholecystectomy or Splenectomy: no  H/O Asthma, Pulm HTN, AVN, Leg Ulcers, Nephropathy, Retinopathy, etc: Iron overload, asthma, chronic lung disease, physical limitations from early stroke    ---------------------------------------  Jennifer Cervantes's Goals (discussed today, 8/11/23)    1-3 month goal:  Continue interviews and accept a job if offered    6 month goal:  Save money for ultimate goal of moving out    12 month goal:  Move into her own place     Disease-specific goal(s):  Continue to wean oxycodone down. Minimize infusion center visits.  ---------------------------------------      Current Outpatient Medications   Medication Sig Dispense Refill    oxyCODONE IR (ROXICODONE) 15 MG tablet Take 1 tablet (15 mg) by mouth every 4 hours as needed for severe pain Goal 4 per day. Max 6 per day. 15 tablet 0    acetaminophen (TYLENOL) 325 MG tablet Take 2 tablets (650 mg) by mouth every 6 hours as needed for mild pain 120 tablet 3    albuterol (PROAIR HFA/PROVENTIL HFA/VENTOLIN HFA) 108 (90 Base) MCG/ACT inhaler Inhale 2 puffs into the lungs every 6 hours as needed for shortness of breath or wheezing 8.5 g 3    albuterol (PROVENTIL) (2.5 MG/3ML) 0.083% neb solution Take 2 vials (5 mg) by nebulization every 6 hours as  needed for shortness of breath or wheezing 90 mL 3    aspirin (ASA) 81 MG chewable tablet Take 1 tablet (81 mg) by mouth 2 times daily 60 tablet 11    budesonide-formoterol (SYMBICORT) 160-4.5 MCG/ACT Inhaler Inhale 2 puffs into the lungs 2 times daily 10.2 g 3    cetirizine (ZYRTEC) 10 MG tablet Take 1 tablet (10 mg) by mouth daily 30 tablet 1    deferasirox (JADENU) 360 MG tablet Take 4 tablets (1,440 mg) by mouth every evening 120 tablet 4    diphenhydrAMINE (BENADRYL) 25 MG capsule Take 1 capsule (25 mg) by mouth every 6 hours as needed for itching or allergies 30 capsule 0    EPINEPHrine (ANY BX GENERIC EQUIV) 0.3 MG/0.3ML injection 2-pack Inject 0.3 mLs (0.3 mg) into the muscle as needed for anaphylaxis (Patient not taking: Reported on 3/10/2023) 1 each 1    FLUoxetine (PROZAC) 10 MG capsule Take 1 capsule (10 mg) by mouth daily For two weeks, then increase to 20 mg if 10 mg not effective 40 capsule 1    Hydroxyurea 1000 MG TABS Take 3,000 mg by mouth daily 90 tablet 3    naloxone (NARCAN) 4 MG/0.1ML nasal spray Spray 4 mg into one nostril alternating nostrils as needed for opioid reversal every 2-3 minutes until assistance arrives (Patient not taking: Reported on 5/18/2023)      ondansetron (ZOFRAN) 8 MG tablet Take 1 tablet (8 mg) by mouth every 8 hours as needed (Patient not taking: Reported on 6/5/2023) 30 tablet 1    traZODone (DESYREL) 50 MG tablet Take 1 tablet (50 mg) by mouth At Bedtime 30 tablet 1       Past Medical History  Past Medical History:   Diagnosis Date    Anxiety     Bleeding disorder (H)     Blood clotting disorder (H)     Cerebral infarction (H) 2015    Cognitive developmental delay     low IQ. 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 side (H)     right hand contractures    Iron overload due to repeated red blood cell transfusions     Migraines     Multiple  subsegmental pulmonary emboli without acute cor pulmonale (H) 02/01/2021    Oppositional defiant behavior     Presence of intrauterine contraceptive device 2/18/2020    Superficial venous thrombosis of arm, right 03/25/2021    Uncomplicated asthma      Past Surgical History:   Procedure Laterality Date    AS INSERT TUNNELED CV 2 CATH W/O PORT/PUMP      CHOLECYSTECTOMY      CV RIGHT HEART CATH MEASUREMENTS RECORDED N/A 11/18/2021    Procedure: Right Heart Cath;  Surgeon: Jackson Stauffer MD;  Location:  HE[facility] CARD[facility] CATH LAB    INSERT PORT VASCULAR ACCESS Left 4/21/2021    Procedure: INSERTION, VASCULAR ACCESS PORT (NOT SURE ON SIDE UNTIL REMOVAL);  Surgeon: Rajan More MD;  Location: [facility] OR    IR CHEST PORT PLACEMENT > 5 YRS OF AGE  4/21/2021    IR CVC NON TUNNEL LINE REMOVAL  5/6/2021    IR CVC NON TUNNEL PLACEMENT > 5 YRS  04/07/2020    IR CVC NON TUNNEL PLACEMENT > 5 YRS  4/30/2021    IR CVC NON TUNNEL PLACEMENT > 5 YRS  9/7/2022    IR PORT REMOVAL LEFT  4/21/2021    REMOVE PORT VASCULAR ACCESS Left 4/21/2021    Procedure: REMOVAL, VASCULAR ACCESS PORT LEFT;  Surgeon: Rajan More MD;  Location: [facility] OR    REPAIR TENDON ELBOW Right 10/02/2019    Procedure: Right Elbow Flexor Lengthening, Flexor Pronator Slide Of Wrist and Finger, Thumb Adductor Lengthening;  Surgeon: Anai Franco MD;  Location: [facility] OR    TONSILLECTOMY Bilateral 10/02/2019    Procedure: Bilateral Tonsillectomy;  Surgeon: Farhana Guy MD;  Location: [facility] OR    ZZC BREAST REDUCTION (INCLUDES LIPO) TIER 3 Bilateral 04/23/2019     Allergies   Allergen Reactions    Contrast Dye      Hives and breathing issues    Fish-Derived Products Hives    Seafood Hives    Gadolinium     Iodinated Contrast Media      Social History   Social History     Tobacco Use    Smoking status: Never     Passive exposure: Never    Smokeless tobacco: Never   Substance Use Topics    Alcohol use: Not Currently     Alcohol/week: 0.0 standard drinks  of alcohol    Drug use: Never    Still living at home with mom and extended family.     Past medical history and social history were reviewed.    Physical Examination:  /78   Pulse 83   Temp 97.3  F (36.3  C)   Resp 16   Wt 65.5 kg (144 lb 4.8 oz)   SpO2 98%   BMI 24.77 kg/m       Wt Readings from Last 10 Encounters:   08/11/23 65.5 kg (144 lb 4.8 oz)   07/14/23 65.2 kg (143 lb 12.8 oz)   07/10/23 65.3 kg (144 lb)   06/16/23 67 kg (147 lb 12.8 oz)   06/05/23 67.9 kg (149 lb 11.2 oz)   05/19/23 69.3 kg (152 lb 12.8 oz)   05/13/23 72.2 kg (159 lb 3.2 oz)   05/03/23 74.8 kg (164 lb 14.5 oz)   04/30/23 74.8 kg (165 lb)   04/14/23 69.4 kg (153 lb 1.6 oz)     General: Pleasant female, NAD  Eyes: EOMI, PERRL. Mild. scleral icterus.  Respiratory: Normal respiratory effort.  Ext: No peripheral edema.  MSK: Contractured right arm and hand 2/2 stoke.  Neurologic: Grossly nonfocal. A/O x 4.  Skin: No rashes, petechiae, or bruising noted on exposed skin.    Laboratory Data:  Most Recent 3 CBC's:  Recent Labs   Lab Test 08/11/23  0939 08/02/23  1656 07/14/23  0948   WBC 11.4* 10.2 14.4*   HGB 7.2* 6.1* 7.3*   MCV 84 78 83   * 445 517*   ANEUTAUTO 7.3 6.7 10.5*    Most Recent 3 BMP's:  Recent Labs   Lab Test 08/11/23  0939 08/02/23  1656 07/14/23  0948 06/16/23  1024 06/05/23  0910 05/17/23  1048 05/13/23  0605 05/12/23  0731 05/11/23  0648    139 137   < > 136   < > 141   < > 139   POTASSIUM 3.8 4.0 3.8   < > 3.7   < > 4.2   < > 4.3   CHLORIDE 107 107 105   < > 106   < > 112*   < > 109*   CO2 21* 23 22   < > 22   < > 20*   < > 19*   BUN 8.4 5.1* 8.6   < > 5.7*   < > 10.3   < > 6.3   CR 0.59 0.58 0.62   < > 0.53   < > 0.62   < > 0.53   ANIONGAP 10 9 10   < > 8   < > 9   < > 11   MICAH 9.0 8.9 9.1   < > 9.1   < > 8.1*   < > 8.9   GLC 93 85 95   < > 93   < > 96   < > 137*   PROTTOTAL  --  7.1  --   --  7.6  --   --   --  7.2   ALBUMIN  --  4.2  --   --  4.5  --  3.8   < > 4.2    < > = values in this interval  not displayed.    Most Recent 2 LFT's:  Recent Labs   Lab Test 08/02/23  1656 06/05/23  0910   AST 40 29   ALT 14 18   ALKPHOS 65 67   BILITOTAL 3.7* 2.2*      Lab Results   Component Value Date    RETP 18.7 (H) 08/11/2023      Assessment and Plan:  1. Sickle Cell HgbSS Disease  2. Recent hospitalization with acute chest, pulmonary edema (1/2023)  3. Chronic Pain  4. 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 History of CVA  7. Hearing loss    Jennifer continues to make progress with her oxycodone taper.  She desires to further decrease today by 5 tablets which would allow for 15 tablets per prescription.  She has continued to request refills weekly despite receiving less then 1 weeks worth of medication.  Next month she would like to decrease to 10 tablets per prescription.  She remains hesitant to completely discontinue the oxycodone.  Today we did discuss trying to further extend her tablets by more than 1 week when she gets down to around 10 tablets per fill.  She is feeling very optimistic about her recent job interviews and is hopeful to accept a position sometime in the next few weeks. We extensively discussed her future goals to decrease the amount of infusion center visits when she starts working.  Ultimately she would like to continue to minimize her ED visits and infusion center visits and only attend monthly sidney appointments. She has had 2 hospital admissions and 3 additional ED visits so far this calendar year. At this time we will continue her infusion center limitations at 2 visits per week. Despite intermittent influxes in pain following sidney infusions, she believes these are helpful in reducing pain crises. We will continue monthly infusions.    Labs today reviewed and stable, hgb 7.2. Mild leukocytosis noted with WBC 11.4. She denies any infectious concerns.  She has not had issues with asthma exacerbations since her last hospital visit.    Desferal  is currently on hold due to desire to stop/hold infusion due to decreased quality of life related to the amount of time she needed to be connected for infusion. Remains on Jadenu. A repeat Ferriscan is needed and is scheduled for next week, 8/16/23.        Plan:  -Continue Hydrea to 3000mg daily to help lessen frequency of sickle cell pain.   -She has resumed crizanlizumab infusions which we will continue monthly  -Continue slow taper of oxycodone. Will decrease further to 15 tabs per fill. She can continue the frequency at every 4 hours with a max of 6 tablets per day but with the decreased tablets per refill should aim for a max of 4 tablets per day. She can self-reduce infusion days/week and we will continue to keep the cap at 2/week for now.  -Continue infusion center visits limited to two times per week (Mondays and Fridays ideally although still needs to call to request). Continue diligent home management with current medications, heat, rest, compression, warm baths.   -If unable to manage at home can go to ED but continue to not do IV narcotics in the emergency room. Ketamine previously added to pain plan in ED  - Desferal infusions on hold. Continue Jadenu and check Ferriscan 8/16/23. Will likely need to resume infusions in the future.   -Continue aspirin BID  -RTC with me in 1 month, coordinate with sidney infusions    60 minutes spent on the date of the encounter doing chart review, review of test results, interpretation of tests, patient visit, and documentation     Patricia Mantilla, JOCELYN

## 2023-08-11 ENCOUNTER — ONCOLOGY VISIT (OUTPATIENT)
Dept: ONCOLOGY | Facility: CLINIC | Age: 23
End: 2023-08-11
Attending: REGISTERED NURSE
Payer: COMMERCIAL

## 2023-08-11 ENCOUNTER — NURSE TRIAGE (OUTPATIENT)
Dept: ONCOLOGY | Facility: CLINIC | Age: 23
End: 2023-08-11
Payer: COMMERCIAL

## 2023-08-11 ENCOUNTER — INFUSION THERAPY VISIT (OUTPATIENT)
Dept: ONCOLOGY | Facility: CLINIC | Age: 23
End: 2023-08-11
Payer: COMMERCIAL

## 2023-08-11 ENCOUNTER — APPOINTMENT (OUTPATIENT)
Dept: LAB | Facility: CLINIC | Age: 23
End: 2023-08-11
Payer: COMMERCIAL

## 2023-08-11 VITALS
HEART RATE: 83 BPM | WEIGHT: 144.3 LBS | TEMPERATURE: 97.3 F | DIASTOLIC BLOOD PRESSURE: 78 MMHG | BODY MASS INDEX: 24.77 KG/M2 | RESPIRATION RATE: 16 BRPM | OXYGEN SATURATION: 98 % | SYSTOLIC BLOOD PRESSURE: 117 MMHG

## 2023-08-11 DIAGNOSIS — D57.00 SICKLE CELL PAIN CRISIS (H): ICD-10-CM

## 2023-08-11 DIAGNOSIS — D57.1 HB-SS DISEASE WITHOUT CRISIS (H): ICD-10-CM

## 2023-08-11 DIAGNOSIS — E83.111 IRON OVERLOAD DUE TO REPEATED RED BLOOD CELL TRANSFUSIONS: ICD-10-CM

## 2023-08-11 DIAGNOSIS — G81.10 SPASTIC HEMIPLEGIA, UNSPECIFIED ETIOLOGY, UNSPECIFIED LATERALITY (H): ICD-10-CM

## 2023-08-11 DIAGNOSIS — D57.00 SICKLE CELL PAIN CRISIS (H): Primary | ICD-10-CM

## 2023-08-11 DIAGNOSIS — D57.1 HB-SS DISEASE WITHOUT CRISIS (H): Primary | ICD-10-CM

## 2023-08-11 LAB
ANION GAP SERPL CALCULATED.3IONS-SCNC: 10 MMOL/L (ref 7–15)
BASOPHILS # BLD AUTO: 0.3 10E3/UL (ref 0–0.2)
BASOPHILS NFR BLD AUTO: 3 %
BUN SERPL-MCNC: 8.4 MG/DL (ref 6–20)
CALCIUM SERPL-MCNC: 9 MG/DL (ref 8.6–10)
CHLORIDE SERPL-SCNC: 107 MMOL/L (ref 98–107)
CREAT SERPL-MCNC: 0.59 MG/DL (ref 0.51–0.95)
DEPRECATED HCO3 PLAS-SCNC: 21 MMOL/L (ref 22–29)
EOSINOPHIL # BLD AUTO: 0.6 10E3/UL (ref 0–0.7)
EOSINOPHIL NFR BLD AUTO: 5 %
ERYTHROCYTE [DISTWIDTH] IN BLOOD BY AUTOMATED COUNT: 27.1 % (ref 10–15)
FERRITIN SERPL-MCNC: 4982 NG/ML (ref 6–175)
GFR SERPL CREATININE-BSD FRML MDRD: >90 ML/MIN/1.73M2
GLUCOSE SERPL-MCNC: 93 MG/DL (ref 70–99)
HCT VFR BLD AUTO: 21.2 % (ref 35–47)
HGB BLD-MCNC: 7.2 G/DL (ref 11.7–15.7)
IMM GRANULOCYTES # BLD: 0 10E3/UL
IMM GRANULOCYTES NFR BLD: 0 %
LYMPHOCYTES # BLD AUTO: 2.5 10E3/UL (ref 0.8–5.3)
LYMPHOCYTES NFR BLD AUTO: 22 %
MCH RBC QN AUTO: 28.3 PG (ref 26.5–33)
MCHC RBC AUTO-ENTMCNC: 34 G/DL (ref 31.5–36.5)
MCV RBC AUTO: 84 FL (ref 78–100)
MONOCYTES # BLD AUTO: 0.7 10E3/UL (ref 0–1.3)
MONOCYTES NFR BLD AUTO: 6 %
NEUTROPHILS # BLD AUTO: 7.3 10E3/UL (ref 1.6–8.3)
NEUTROPHILS NFR BLD AUTO: 64 %
NRBC # BLD AUTO: 0.3 10E3/UL
NRBC BLD AUTO-RTO: 3 /100
PLATELET # BLD AUTO: 486 10E3/UL (ref 150–450)
POTASSIUM SERPL-SCNC: 3.8 MMOL/L (ref 3.4–5.3)
RBC # BLD AUTO: 2.54 10E6/UL (ref 3.8–5.2)
RETICS # AUTO: 0.47 10E6/UL (ref 0.03–0.1)
RETICS/RBC NFR AUTO: 18.7 % (ref 0.5–2)
SODIUM SERPL-SCNC: 138 MMOL/L (ref 136–145)
WBC # BLD AUTO: 11.4 10E3/UL (ref 4–11)

## 2023-08-11 PROCEDURE — 250N000011 HC RX IP 250 OP 636: Mod: JZ | Performed by: REGISTERED NURSE

## 2023-08-11 PROCEDURE — G0463 HOSPITAL OUTPT CLINIC VISIT: HCPCS | Mod: 25 | Performed by: REGISTERED NURSE

## 2023-08-11 PROCEDURE — 36591 DRAW BLOOD OFF VENOUS DEVICE: CPT | Performed by: PEDIATRICS

## 2023-08-11 PROCEDURE — 82310 ASSAY OF CALCIUM: CPT | Performed by: PEDIATRICS

## 2023-08-11 PROCEDURE — 96376 TX/PRO/DX INJ SAME DRUG ADON: CPT

## 2023-08-11 PROCEDURE — 250N000011 HC RX IP 250 OP 636: Mod: JZ | Performed by: PEDIATRICS

## 2023-08-11 PROCEDURE — 99215 OFFICE O/P EST HI 40 MIN: CPT | Performed by: REGISTERED NURSE

## 2023-08-11 PROCEDURE — 85025 COMPLETE CBC W/AUTO DIFF WBC: CPT | Performed by: PEDIATRICS

## 2023-08-11 PROCEDURE — 96361 HYDRATE IV INFUSION ADD-ON: CPT

## 2023-08-11 PROCEDURE — 96375 TX/PRO/DX INJ NEW DRUG ADDON: CPT

## 2023-08-11 PROCEDURE — 258N000003 HC RX IP 258 OP 636: Performed by: PEDIATRICS

## 2023-08-11 PROCEDURE — 85045 AUTOMATED RETICULOCYTE COUNT: CPT | Performed by: PEDIATRICS

## 2023-08-11 PROCEDURE — 96365 THER/PROPH/DIAG IV INF INIT: CPT

## 2023-08-11 PROCEDURE — 250N000013 HC RX MED GY IP 250 OP 250 PS 637: Performed by: PEDIATRICS

## 2023-08-11 PROCEDURE — 82728 ASSAY OF FERRITIN: CPT

## 2023-08-11 PROCEDURE — 96413 CHEMO IV INFUSION 1 HR: CPT

## 2023-08-11 RX ORDER — HEPARIN SODIUM (PORCINE) LOCK FLUSH IV SOLN 100 UNIT/ML 100 UNIT/ML
5 SOLUTION INTRAVENOUS
Status: DISCONTINUED | OUTPATIENT
Start: 2023-08-11 | End: 2023-08-11 | Stop reason: HOSPADM

## 2023-08-11 RX ORDER — HEPARIN SODIUM (PORCINE) LOCK FLUSH IV SOLN 100 UNIT/ML 100 UNIT/ML
5 SOLUTION INTRAVENOUS EVERY 8 HOURS
Status: DISCONTINUED | OUTPATIENT
Start: 2023-08-11 | End: 2023-08-11 | Stop reason: HOSPADM

## 2023-08-11 RX ORDER — OXYCODONE HYDROCHLORIDE 15 MG/1
15 TABLET ORAL EVERY 4 HOURS PRN
Qty: 15 TABLET | Refills: 0
Start: 2023-08-11 | End: 2023-08-14

## 2023-08-11 RX ORDER — DIPHENHYDRAMINE HCL 25 MG
25 CAPSULE ORAL
Status: COMPLETED | OUTPATIENT
Start: 2023-08-11 | End: 2023-08-11

## 2023-08-11 RX ORDER — ONDANSETRON 4 MG/1
8 TABLET, ORALLY DISINTEGRATING ORAL
Status: COMPLETED | OUTPATIENT
Start: 2023-08-11 | End: 2023-08-11

## 2023-08-11 RX ADMIN — DIPHENHYDRAMINE HYDROCHLORIDE 25 MG: 25 CAPSULE ORAL at 10:57

## 2023-08-11 RX ADMIN — SODIUM CHLORIDE 250 ML: 9 INJECTION, SOLUTION INTRAVENOUS at 11:14

## 2023-08-11 RX ADMIN — SODIUM CHLORIDE 300 MG: 9 INJECTION, SOLUTION INTRAVENOUS at 11:14

## 2023-08-11 RX ADMIN — Medication 5 ML: at 13:20

## 2023-08-11 RX ADMIN — SODIUM CHLORIDE, POTASSIUM CHLORIDE, SODIUM LACTATE AND CALCIUM CHLORIDE 1000 ML: 600; 310; 30; 20 INJECTION, SOLUTION INTRAVENOUS at 10:10

## 2023-08-11 RX ADMIN — HYDROMORPHONE HYDROCHLORIDE 1 MG: 1 INJECTION, SOLUTION INTRAMUSCULAR; INTRAVENOUS; SUBCUTANEOUS at 12:15

## 2023-08-11 RX ADMIN — ONDANSETRON 8 MG: 4 TABLET, ORALLY DISINTEGRATING ORAL at 10:58

## 2023-08-11 RX ADMIN — HYDROMORPHONE HYDROCHLORIDE 1 MG: 1 INJECTION, SOLUTION INTRAMUSCULAR; INTRAVENOUS; SUBCUTANEOUS at 11:11

## 2023-08-11 RX ADMIN — HYDROMORPHONE HYDROCHLORIDE 1 MG: 1 INJECTION, SOLUTION INTRAMUSCULAR; INTRAVENOUS; SUBCUTANEOUS at 10:12

## 2023-08-11 RX ADMIN — Medication 5 ML: at 09:41

## 2023-08-11 ASSESSMENT — PAIN SCALES - GENERAL: PAINLEVEL: EXTREME PAIN (9)

## 2023-08-11 NOTE — NURSING NOTE
Chief Complaint   Patient presents with    Port Draw     Power needle. Heparin locked,vitals checked     Elena Mcghee RN on 8/11/2023 at 9:44 AM

## 2023-08-11 NOTE — PROGRESS NOTES
Infusion Nursing Note:  Jennifer Cervantes presents today for IVF/Pain medication/Crizanlizumab.    Patient seen by provider today: Yes: Patricia Mantilla NP at infusion.   present during visit today: Not Applicable.    Note: Patient endorses non radiating bilateral leg pain with PS 9/10. Denies fever/chills. NO new concern.    Patient had 3 doses of Dilaudid with PS 4/10.      Intravenous Access:  Implanted Port.    Treatment Conditions:  Lab Results   Component Value Date    HGB 7.2 (L) 08/11/2023    WBC 11.4 (H) 08/11/2023    ANEU 6.7 05/12/2023    ANEUTAUTO 7.3 08/11/2023     (H) 08/11/2023        Lab Results   Component Value Date     08/11/2023    POTASSIUM 3.8 08/11/2023    MAG 2.0 11/11/2021    CR 0.59 08/11/2023    MICAH 9.0 08/11/2023    BILITOTAL 3.7 (H) 08/02/2023    ALBUMIN 4.2 08/02/2023    ALT 14 08/02/2023    AST 40 08/02/2023       Results reviewed, labs MET treatment parameters, ok to proceed with treatment.    TORB: 8/11/23/Patricia Mantilla NP/Dione Delarosa RN/ May proceed with Crizanlizumab as planned today. May have pain medication and fluids today.       Post Infusion Assessment:  Patient tolerated infusion without incident.  Observation 30 minutes post Crizanlizumab.  Blood return noted pre and post infusion.  Site patent and intact, free from redness, edema or discomfort.  No evidence of extravasations.  Access discontinued per protocol.       Discharge Plan:     Patient declined prescription refills.  Discharge instructions reviewed with: Patient.  Patient and/or family verbalized understanding of discharge instructions and all questions answered.  Copy of AVS reviewed with patient and/or family.  Patient will return 9/8/23 for next appointment.  AVS to patient via [employer].  Patient will return 9/8/23 for next appointment.   Patient discharged in stable condition accompanied by: self.  Departure Mode: Ambulatory.      GLORIA YANCEY RN

## 2023-08-11 NOTE — Clinical Note
"    8/11/2023         RE: Jennifer Cervantes  [address]  [facility] [address]        Dear Colleague,    Thank you for referring your patient, Jennifer Cervantes, to the [facility]. Please see a copy of my visit note below.    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 She had multiple thromboembolic events in 2021 despite adherent anticoagulation use (though warfarin was perpetually low) and there are concerns for chronic thromboembolic disease but did not have pulmonary HTN on a November 2021 cath. She is maintained on chronic PO opioids and twice-weekly infusion visits (since 1/24/22) but has been able to be maintained on this regimen and has stayed out of the ED most of the time with even rarer admissions.     Interval History:  ***    Sickle Cell Disease Comprehensive Checklist  Bone Health/Avascular Necrosis Screening/Symptoms (each visit): no new concerns today  Leg Ulcer evaluation (every visit): none  Hypertension (every visit):stable 3/10/23  Last pulmonary evaluation (asthma, AMAN, pulm HTN): 9/28/22  Stroke/silent cerebral infarct Hx (Y/N): Yes TIA ~2014, first event ~age 1 with full stroke and R sided weakness  Last PCP Visit: 3/6/23  Vaccines:  PCV13: 5/13/19  Pneumovax (PPSV23): 3/04, 10/09, 7/12/19 (next due 7/2024)  Menactra: 4/2010, 9/2015 (MCV done 8/16/21)  Influenza: 11/17/2022   Audiology (chelation): done 6/2020, normal.. However, on 6/7, \"While there is no significant change in grade on " "the CTCAE 4.03 Scale, there were hearing changes bilaterally for both standard and extended high frequency audiometry.  Will need to determine if an ENT consult is advised due to the asymmetry in extended high frequency testing.\"     Plan last reviewed with patient: 7/11/22    Patient background: 21 yo F, enjoys movies and kids though there are times where she does not really want to talk to people. Does not have a lot of social support at home.     Sickle Cell Disease History  Primary Hematologist Team: Jose Rafael Duncan  PCP: none  Genotype: SS  Acute Pain Crisis Treatment: (avoiding IV opioids for now, which she has agreed to)  ER   Oxycodone 15-20 mg x1  Ketamine 4mg IV x 2--helps with opioid sparing  Toradol 15 mg IV x1   Maintenance IV fluids with LR  Other: Zofran 8 mg IV PRN nausea  Inpatient:  Home oxycodone/Oxycontin regimen, though home oxycodone dosing could be increased to 20 mg to start  PCA plan:   None for now  Other Medications: Zofran  She has had success with ketamine starting at 4mg/h and advancing only to 6mg/h, as 8mg/h made her feel quite poorly..  ASA  Supportive Care: Docusate, Senna  Chronic Pain Medications:    Home regimen Oxycontin 10 mg q12h, oxycodone 15 mg p.o. q.4-6 hours p.r.n. breakthrough pain.  She also continues on Voltaren gel, and Zoloft among other medications.    -Also benefits from mental health visits, acupuncture  Baseline Hemoglobin: 7 g/dl without chronic transfusions  Hydroxyurea use: Yes  H/O blood transfusions: Yes, several (iron overload) Most recent 11/20/2021  H/O Transfusion Reactions: no  Antibodies:none  H/O Acute Chest Syndrome: Yes  Last episode:9/05/22 (previously 4/26/21, 10/2019)   ICU/intubation: not with 9/2022 admission  H/O Stroke: Yes (managed with chronic transfusions in the past, stopped late Spring 2020)  H/O VTE: Yes (2/2021)  H/O Cholecystectomy or Splenectomy: no  H/O Asthma, Pulm HTN, AVN, Leg Ulcers, Nephropathy, Retinopathy, etc: Iron overload, " asthma, chronic lung disease, physical limitations from early stroke    ---------------------------------------  Jennifer Cervantes's Goals (discussed today, 7/14/23)    1-3 month goal:  Continue to look for a job    6 month goal:  Save money for ultimate goal of moving out    12 month goal:  Move into her own place     Disease-specific goal(s):  Continue to wean oxycodone down  ---------------------------------------      Current Outpatient Medications   Medication Sig Dispense Refill    acetaminophen (TYLENOL) 325 MG tablet Take 2 tablets (650 mg) by mouth every 6 hours as needed for mild pain 120 tablet 3    albuterol (PROAIR HFA/PROVENTIL HFA/VENTOLIN HFA) 108 (90 Base) MCG/ACT inhaler Inhale 2 puffs into the lungs every 6 hours as needed for shortness of breath or wheezing 8.5 g 3    albuterol (PROVENTIL) (2.5 MG/3ML) 0.083% neb solution Take 2 vials (5 mg) by nebulization every 6 hours as needed for shortness of breath or wheezing 90 mL 3    aspirin (ASA) 81 MG chewable tablet Take 1 tablet (81 mg) by mouth 2 times daily 60 tablet 11    budesonide-formoterol (SYMBICORT) 160-4.5 MCG/ACT Inhaler Inhale 2 puffs into the lungs 2 times daily 10.2 g 3    cetirizine (ZYRTEC) 10 MG tablet Take 1 tablet (10 mg) by mouth daily 30 tablet 1    deferasirox (JADENU) 360 MG tablet Take 4 tablets (1,440 mg) by mouth every evening 120 tablet 4    diphenhydrAMINE (BENADRYL) 25 MG capsule Take 1 capsule (25 mg) by mouth every 6 hours as needed for itching or allergies 30 capsule 0    EPINEPHrine (ANY BX GENERIC EQUIV) 0.3 MG/0.3ML injection 2-pack Inject 0.3 mLs (0.3 mg) into the muscle as needed for anaphylaxis (Patient not taking: Reported on 3/10/2023) 1 each 1    FLUoxetine (PROZAC) 10 MG capsule Take 1 capsule (10 mg) by mouth daily For two weeks, then increase to 20 mg if 10 mg not effective 40 capsule 1    Hydroxyurea 1000 MG TABS Take 3,000 mg by mouth daily 90 tablet 3    naloxone (NARCAN) 4 MG/0.1ML nasal spray Spray 4 mg  into one nostril alternating nostrils as needed for opioid reversal every 2-3 minutes until assistance arrives (Patient not taking: Reported on 5/18/2023)      ondansetron (ZOFRAN) 8 MG tablet Take 1 tablet (8 mg) by mouth every 8 hours as needed (Patient not taking: Reported on 6/5/2023) 30 tablet 1    oxyCODONE IR (ROXICODONE) 15 MG tablet Take 1 tablet (15 mg) by mouth every 4 hours as needed for severe pain Goal 4 per day. Max 6 per day. 20 tablet 0    traZODone (DESYREL) 50 MG tablet Take 1 tablet (50 mg) by mouth At Bedtime 30 tablet 1       Past Medical History  Past Medical History:   Diagnosis Date    Anxiety     Bleeding disorder (H)     Blood clotting disorder (H)     Cerebral infarction (H) 2015    Cognitive developmental delay     low IQ. 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 side (H)     right hand contractures    Iron overload due to repeated red blood cell transfusions     Migraines     Multiple subsegmental pulmonary emboli without acute cor pulmonale (H) 02/01/2021    Oppositional defiant behavior     Presence of intrauterine contraceptive device 2/18/2020    Superficial venous thrombosis of arm, right 03/25/2021    Uncomplicated asthma      Past Surgical History:   Procedure Laterality Date    AS INSERT TUNNELED CV 2 CATH W/O PORT/PUMP      CHOLECYSTECTOMY      CV RIGHT HEART CATH MEASUREMENTS RECORDED N/A 11/18/2021    Procedure: Right Heart Cath;  Surgeon: Jackson Stauffer MD;  Location:  HEART CARDIAC CATH LAB    INSERT PORT VASCULAR ACCESS Left 4/21/2021    Procedure: INSERTION, VASCULAR ACCESS PORT (NOT SURE ON SIDE UNTIL REMOVAL);  Surgeon: Rajan More MD;  Location: [facility] OR    IR CHEST PORT PLACEMENT > 5 YRS OF AGE  4/21/2021    IR CVC NON TUNNEL LINE REMOVAL  5/6/2021    IR CVC NON TUNNEL PLACEMENT > 5 YRS  04/07/2020    IR CVC NON TUNNEL PLACEMENT > 5 YRS   4/30/2021    IR CVC NON TUNNEL PLACEMENT > 5 YRS  9/7/2022    IR PORT REMOVAL LEFT  4/21/2021    REMOVE PORT VASCULAR ACCESS Left 4/21/2021    Procedure: REMOVAL, VASCULAR ACCESS PORT LEFT;  Surgeon: Rajan More MD;  Location: [facility] OR    REPAIR TENDON ELBOW Right 10/02/2019    Procedure: Right Elbow Flexor Lengthening, Flexor Pronator Slide Of Wrist and Finger, Thumb Adductor Lengthening;  Surgeon: Anai Franco MD;  Location: [facility] OR    TONSILLECTOMY Bilateral 10/02/2019    Procedure: Bilateral Tonsillectomy;  Surgeon: Farhana Guy MD;  Location: [facility] OR    ZZC BREAST REDUCTION (INCLUDES LIPO) TIER 3 Bilateral 04/23/2019     Allergies   Allergen Reactions    Contrast Dye      Hives and breathing issues    Fish-Derived Products Hives    Seafood Hives    Gadolinium     Iodinated Contrast Media      Social History   Social History     Tobacco Use    Smoking status: Never     Passive exposure: Never    Smokeless tobacco: Never   Substance Use Topics    Alcohol use: Not Currently     Alcohol/week: 0.0 standard drinks of alcohol    Drug use: Never    Still living at home with mom and extended family.     Past medical history and social history were reviewed.    Physical Examination:  There were no vitals taken for this visit.     Wt Readings from Last 10 Encounters:   07/14/23 65.2 kg (143 lb 12.8 oz)   07/10/23 65.3 kg (144 lb)   06/16/23 67 kg (147 lb 12.8 oz)   06/05/23 67.9 kg (149 lb 11.2 oz)   05/19/23 69.3 kg (152 lb 12.8 oz)   05/13/23 72.2 kg (159 lb 3.2 oz)   05/03/23 74.8 kg (164 lb 14.5 oz)   04/30/23 74.8 kg (165 lb)   04/14/23 69.4 kg (153 lb 1.6 oz)   03/27/23 70.8 kg (156 lb)     General: Pleasant female, NAD  Eyes: EOMI, PERRL. Mild. scleral icterus.  Respiratory: Normal respiratory effort.  Ext: No peripheral edema.  MSK: Contractured right arm and hand 2/2 stoke.  Neurologic: Grossly nonfocal. A/O x 4.  Skin: No rashes, petechiae, or bruising noted on exposed skin.    Laboratory  Data:  Most Recent 3 CBC's:  Recent Labs   Lab Test 08/02/23  1656 07/14/23  0948 06/16/23  1024   WBC 10.2 14.4* 13.0*   HGB 6.1* 7.3* 7.9*   MCV 78 83 82    517* 521*   ANEUTAUTO 6.7 10.5* 10.1*    Most Recent 3 BMP's:  Recent Labs   Lab Test 08/02/23  1656 07/14/23  0948 06/16/23  1024 06/05/23  0910 05/17/23  1048 05/13/23  0605 05/12/23  0731 05/11/23  0648    137 140 136   < > 141   < > 139   POTASSIUM 4.0 3.8 4.0 3.7   < > 4.2   < > 4.3   CHLORIDE 107 105 108* 106   < > 112*   < > 109*   CO2 23 22 21* 22   < > 20*   < > 19*   BUN 5.1* 8.6 8.7 5.7*   < > 10.3   < > 6.3   CR 0.58 0.62 0.59 0.53   < > 0.62   < > 0.53   ANIONGAP 9 10 11 8   < > 9   < > 11   MICAH 8.9 9.1 9.4 9.1   < > 8.1*   < > 8.9   GLC 85 95 88 93   < > 96   < > 137*   PROTTOTAL 7.1  --   --  7.6  --   --   --  7.2   ALBUMIN 4.2  --   --  4.5  --  3.8   < > 4.2    < > = values in this interval not displayed.    Most Recent 2 LFT's:  Recent Labs   Lab Test 08/02/23 1656 06/05/23  0910   AST 40 29   ALT 14 18   ALKPHOS 65 67   BILITOTAL 3.7* 2.2*      Lab Results   Component Value Date    RETP 21.0 (H) 07/14/2023      Assessment and Plan:  1. Sickle Cell HgbSS Disease  2. Recent hospitalization with acute chest, pulmonary edema (1/2023)  3. Chronic Pain  4. 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 History of CVA  7. Hearing loss    Gil is seen today in the infusion center for routine hematology follow-up.  Overall she is doing well and continues to taper off of oxycodone slowly.  She is hopeful to increase the rate of the taper and reduce by 5 tablets every month.  We will now decrease to 20 tablets per refill which she continues to feel somewhat weekly.  Next month she would like to go to 15 tablets and then 10 tablets per fill in September.  I reiterated that we, her hematology team, are very proud of her efforts to continue to reduce her oxycodone use.  I am very happy to hear  she is proud of herself as well.  She continues to utilize the infusion center routinely although remains limited to 2 visits per week.  Due to clinic capacity she has had weeks where she has not been able to get up twice and has still been able to manage her pain adequately at home.    She continues to pursue work although has not had great luck in finding a job.  I think this would be very helpful for multiple reasons but particularly potential to create some distance from her family members as this seems to create significant stress at times.    Labs today reveal slightly lower hemoglobin at 7.3 and leukocytosis with WBC 14.4.  She denies any infectious concerns.  She has not had issues with asthma exacerbations since her last hospital visit.    Desferal is currently on hold due to desire to stop/hold infusion due to decreased quality of life related to the amount of time she needed to be connected for infusion. Remains on Jadenu which was refilled today.  A repeat Ferriscan has still has not been scheduled and I will discuss this directly with our scheduling team today.      Plan:  -Continue Hydrea to 3000mg daily to help lessen frequency of sickle cell pain. Refilled today.  -She has resume crizanlizumab infusions which we will continue monthly  -Continue slow taper of oxycodone. Will decrease to 20 tablets per fill from 22. She can continue the frequency at every 4 hours with a max of 6 tablets per day but with the decreased tablets per refill should aim for a max of 4 tablets per day. She can self-reduce infusion days/week and we will continue to keep the cap at 2/week for now.  -Continue infusion center visits limited to two times per week (Mondays and Fridays ideally although still needs to call to request). Continue diligent home management with current medications, heat, rest, compression, warm baths.   -If unable to manage at home can go to ED but continue to not do IV narcotics in the emergency room.  Ketamine previously added to pain plan in ED  - Desferal infusions on hold. Continue Jadenu and check Ferriscan. Will likely need to resume infusions in the future.   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 She had multiple thromboembolic events in 2021 despite adherent anticoagulation use (though warfarin was perpetually low) and there are concerns for chronic thromboembolic disease but did not have pulmonary HTN on a November 2021 cath. She is maintained on chronic PO opioids and twice-weekly infusion visits (since 1/24/22) but has been able to be maintained on this regimen and has stayed out of the ED most of the time with even rarer admissions.     Interval History:  Bottom half of body   Pain after sidney  Hips to legs  Couldn't walk  Overall feels helpful  Couldn't sleep  Decrease oxy  Stay at 10 tablets?    Sickle Cell Disease Comprehensive Checklist  Bone Health/Avascular Necrosis Screening/Symptoms (each visit): no new concerns today  Leg Ulcer evaluation (every visit): none  Hypertension (every visit):stable 3/10/23  Last pulmonary evaluation (asthma, AMAN, pulm HTN): 9/28/22  Stroke/silent cerebral infarct Hx (Y/N): Yes TIA ~2014, first event ~age 1 with full stroke and R sided weakness  Last PCP Visit: 3/6/23  Vaccines:  PCV13:  "5/13/19  Pneumovax (PPSV23): 3/04, 10/09, 7/12/19 (next due 7/2024)  Menactra: 4/2010, 9/2015 (MCV done 8/16/21)  Influenza: 11/17/2022   Audiology (chelation): done 6/2020, normal.. However, on 6/7, \"While there is no significant change in grade on the CTCAE 4.03 Scale, there were hearing changes bilaterally for both standard and extended high frequency audiometry.  Will need to determine if an ENT consult is advised due to the asymmetry in extended high frequency testing.\"     Plan last reviewed with patient: 7/11/22    Patient background: 23 yo F, enjoys movies and kids though there are times where she does not really want to talk to people. Does not have a lot of social support at home.     Sickle Cell Disease History  Primary Hematologist Team: Jose Rafael Duncan  PCP: none  Genotype: SS  Acute Pain Crisis Treatment: (avoiding IV opioids for now, which she has agreed to)  ER   Oxycodone 15-20 mg x1  Ketamine 4mg IV x 2--helps with opioid sparing  Toradol 15 mg IV x1   Maintenance IV fluids with LR  Other: Zofran 8 mg IV PRN nausea  Inpatient:  Home oxycodone/Oxycontin regimen, though home oxycodone dosing could be increased to 20 mg to start  PCA plan:   None for now  Other Medications: Zofran  She has had success with ketamine starting at 4mg/h and advancing only to 6mg/h, as 8mg/h made her feel quite poorly..  ASA  Supportive Care: Docusate, Senna  Chronic Pain Medications:    Home regimen Oxycontin 10 mg q12h, oxycodone 15 mg p.o. q.4-6 hours p.r.n. breakthrough pain.  She also continues on Voltaren gel, and Zoloft among other medications.    -Also benefits from mental health visits, acupuncture  Baseline Hemoglobin: 7 g/dl without chronic transfusions  Hydroxyurea use: Yes  H/O blood transfusions: Yes, several (iron overload) Most recent 11/20/2021  H/O Transfusion Reactions: no  Antibodies:none  H/O Acute Chest Syndrome: Yes  Last episode:9/05/22 (previously 4/26/21, 10/2019)   ICU/intubation: not with 9/2022 " admission  H/O Stroke: Yes (managed with chronic transfusions in the past, stopped late Spring 2020)  H/O VTE: Yes (2/2021)  H/O Cholecystectomy or Splenectomy: no  H/O Asthma, Pulm HTN, AVN, Leg Ulcers, Nephropathy, Retinopathy, etc: Iron overload, asthma, chronic lung disease, physical limitations from early stroke    ---------------------------------------  Jennifer Cervantes's Goals (discussed today, 7/14/23)    1-3 month goal:  Continue to look for a job    6 month goal:  Save money for ultimate goal of moving out    12 month goal:  Move into her own place     Disease-specific goal(s):  Continue to wean oxycodone down  ---------------------------------------      Current Outpatient Medications   Medication Sig Dispense Refill     acetaminophen (TYLENOL) 325 MG tablet Take 2 tablets (650 mg) by mouth every 6 hours as needed for mild pain 120 tablet 3     albuterol (PROAIR HFA/PROVENTIL HFA/VENTOLIN HFA) 108 (90 Base) MCG/ACT inhaler Inhale 2 puffs into the lungs every 6 hours as needed for shortness of breath or wheezing 8.5 g 3     albuterol (PROVENTIL) (2.5 MG/3ML) 0.083% neb solution Take 2 vials (5 mg) by nebulization every 6 hours as needed for shortness of breath or wheezing 90 mL 3     aspirin (ASA) 81 MG chewable tablet Take 1 tablet (81 mg) by mouth 2 times daily 60 tablet 11     budesonide-formoterol (SYMBICORT) 160-4.5 MCG/ACT Inhaler Inhale 2 puffs into the lungs 2 times daily 10.2 g 3     cetirizine (ZYRTEC) 10 MG tablet Take 1 tablet (10 mg) by mouth daily 30 tablet 1     deferasirox (JADENU) 360 MG tablet Take 4 tablets (1,440 mg) by mouth every evening 120 tablet 4     diphenhydrAMINE (BENADRYL) 25 MG capsule Take 1 capsule (25 mg) by mouth every 6 hours as needed for itching or allergies 30 capsule 0     EPINEPHrine (ANY BX GENERIC EQUIV) 0.3 MG/0.3ML injection 2-pack Inject 0.3 mLs (0.3 mg) into the muscle as needed for anaphylaxis (Patient not taking: Reported on 3/10/2023) 1 each 1     FLUoxetine  (PROZAC) 10 MG capsule Take 1 capsule (10 mg) by mouth daily For two weeks, then increase to 20 mg if 10 mg not effective 40 capsule 1     Hydroxyurea 1000 MG TABS Take 3,000 mg by mouth daily 90 tablet 3     naloxone (NARCAN) 4 MG/0.1ML nasal spray Spray 4 mg into one nostril alternating nostrils as needed for opioid reversal every 2-3 minutes until assistance arrives (Patient not taking: Reported on 5/18/2023)       ondansetron (ZOFRAN) 8 MG tablet Take 1 tablet (8 mg) by mouth every 8 hours as needed (Patient not taking: Reported on 6/5/2023) 30 tablet 1     oxyCODONE IR (ROXICODONE) 15 MG tablet Take 1 tablet (15 mg) by mouth every 4 hours as needed for severe pain Goal 4 per day. Max 6 per day. 20 tablet 0     traZODone (DESYREL) 50 MG tablet Take 1 tablet (50 mg) by mouth At Bedtime 30 tablet 1       Past Medical History  Past Medical History:   Diagnosis Date     Anxiety      Bleeding disorder (H)      Blood clotting disorder (H)      Cerebral infarction (H) 2015     Cognitive developmental delay     low IQ. Please recognize when managing pain and planning with her     Depressive disorder      Hemiplegia and hemiparesis following cerebral infarction affecting right dominant side (H)     right hand contractures     Iron overload due to repeated red blood cell transfusions      Migraines      Multiple subsegmental pulmonary emboli without acute cor pulmonale (H) 02/01/2021     Oppositional defiant behavior      Presence of intrauterine contraceptive device 2/18/2020     Superficial venous thrombosis of arm, right 03/25/2021     Uncomplicated asthma      Past Surgical History:   Procedure Laterality Date     AS INSERT TUNNELED CV 2 CATH W/O PORT/PUMP       CHOLECYSTECTOMY       CV RIGHT HEART CATH MEASUREMENTS RECORDED N/A 11/18/2021    Procedure: Right Heart Cath;  Surgeon: Jackson Stauffer MD;  Location:  HEART CARDIAC CATH LAB     INSERT PORT VASCULAR ACCESS Left 4/21/2021    Procedure: INSERTION,  VASCULAR ACCESS PORT (NOT SURE ON SIDE UNTIL REMOVAL);  Surgeon: Rajan More MD;  Location: [facility] OR     IR CHEST PORT PLACEMENT > 5 YRS OF AGE  4/21/2021     IR CVC NON TUNNEL LINE REMOVAL  5/6/2021     IR CVC NON TUNNEL PLACEMENT > 5 YRS  04/07/2020     IR CVC NON TUNNEL PLACEMENT > 5 YRS  4/30/2021     IR CVC NON TUNNEL PLACEMENT > 5 YRS  9/7/2022     IR PORT REMOVAL LEFT  4/21/2021     REMOVE PORT VASCULAR ACCESS Left 4/21/2021    Procedure: REMOVAL, VASCULAR ACCESS PORT LEFT;  Surgeon: Rajan More MD;  Location: [facility] OR     REPAIR TENDON ELBOW Right 10/02/2019    Procedure: Right Elbow Flexor Lengthening, Flexor Pronator Slide Of Wrist and Finger, Thumb Adductor Lengthening;  Surgeon: Anai Franco MD;  Location: [facility] OR     TONSILLECTOMY Bilateral 10/02/2019    Procedure: Bilateral Tonsillectomy;  Surgeon: Farhana Guy MD;  Location: [facility] OR     ZZC BREAST REDUCTION (INCLUDES LIPO) TIER 3 Bilateral 04/23/2019     Allergies   Allergen Reactions     Contrast Dye      Hives and breathing issues     Fish-Derived Products Hives     Seafood Hives     Gadolinium      Iodinated Contrast Media      Social History   Social History     Tobacco Use     Smoking status: Never     Passive exposure: Never     Smokeless tobacco: Never   Substance Use Topics     Alcohol use: Not Currently     Alcohol/week: 0.0 standard drinks of alcohol     Drug use: Never    Still living at home with mom and extended family.     Past medical history and social history were reviewed.    Physical Examination:  There were no vitals taken for this visit.     Wt Readings from Last 10 Encounters:   07/14/23 65.2 kg (143 lb 12.8 oz)   07/10/23 65.3 kg (144 lb)   06/16/23 67 kg (147 lb 12.8 oz)   06/05/23 67.9 kg (149 lb 11.2 oz)   05/19/23 69.3 kg (152 lb 12.8 oz)   05/13/23 72.2 kg (159 lb 3.2 oz)   05/03/23 74.8 kg (164 lb 14.5 oz)   04/30/23 74.8 kg (165 lb)   04/14/23 69.4 kg (153 lb 1.6 oz)   03/27/23 70.8 kg (156 lb)      General: Pleasant female, NAD  Eyes: EOMI, PERRL. Mild. scleral icterus.  Respiratory: Normal respiratory effort.  Ext: No peripheral edema.  MSK: Contractured right arm and hand 2/2 stoke.  Neurologic: Grossly nonfocal. A/O x 4.  Skin: No rashes, petechiae, or bruising noted on exposed skin.    Laboratory Data:  Most Recent 3 CBC's:  Recent Labs   Lab Test 08/02/23 1656 07/14/23  0948 06/16/23  1024   WBC 10.2 14.4* 13.0*   HGB 6.1* 7.3* 7.9*   MCV 78 83 82    517* 521*   ANEUTAUTO 6.7 10.5* 10.1*    Most Recent 3 BMP's:  Recent Labs   Lab Test 08/02/23 1656 07/14/23  0948 06/16/23  1024 06/05/23  0910 05/17/23  1048 05/13/23  0605 05/12/23  0731 05/11/23  0648    137 140 136   < > 141   < > 139   POTASSIUM 4.0 3.8 4.0 3.7   < > 4.2   < > 4.3   CHLORIDE 107 105 108* 106   < > 112*   < > 109*   CO2 23 22 21* 22   < > 20*   < > 19*   BUN 5.1* 8.6 8.7 5.7*   < > 10.3   < > 6.3   CR 0.58 0.62 0.59 0.53   < > 0.62   < > 0.53   ANIONGAP 9 10 11 8   < > 9   < > 11   MICAH 8.9 9.1 9.4 9.1   < > 8.1*   < > 8.9   GLC 85 95 88 93   < > 96   < > 137*   PROTTOTAL 7.1  --   --  7.6  --   --   --  7.2   ALBUMIN 4.2  --   --  4.5  --  3.8   < > 4.2    < > = values in this interval not displayed.    Most Recent 2 LFT's:  Recent Labs   Lab Test 08/02/23 1656 06/05/23  0910   AST 40 29   ALT 14 18   ALKPHOS 65 67   BILITOTAL 3.7* 2.2*      Lab Results   Component Value Date    RETP 21.0 (H) 07/14/2023      Assessment and Plan:  1. Sickle Cell HgbSS Disease  2. Recent hospitalization with acute chest, pulmonary edema (1/2023)  3. Chronic Pain  4. 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 History of CVA  7. Hearing loss    Gil is seen today in the infusion center for routine hematology follow-up.  Overall she is doing well and continues to taper off of oxycodone slowly.  She is hopeful to increase the rate of the taper and reduce by 5 tablets every month.  We will  now decrease to 20 tablets per refill which she continues to feel somewhat weekly.  Next month she would like to go to 15 tablets and then 10 tablets per fill in September.  I reiterated that we, her hematology team, are very proud of her efforts to continue to reduce her oxycodone use.  I am very happy to hear she is proud of herself as well.  She continues to utilize the infusion center routinely although remains limited to 2 visits per week.  Due to clinic capacity she has had weeks where she has not been able to get up twice and has still been able to manage her pain adequately at home.    She continues to pursue work although has not had great luck in finding a job.  I think this would be very helpful for multiple reasons but particularly potential to create some distance from her family members as this seems to create significant stress at times.    Labs today reveal slightly lower hemoglobin at 7.3 and leukocytosis with WBC 14.4.  She denies any infectious concerns.  She has not had issues with asthma exacerbations since her last hospital visit.    Desferal is currently on hold due to desire to stop/hold infusion due to decreased quality of life related to the amount of time she needed to be connected for infusion. Remains on Jadenu which was refilled today.  A repeat Ferriscan has still has not been scheduled and I will discuss this directly with our scheduling team today.      Plan:  -Continue Hydrea to 3000mg daily to help lessen frequency of sickle cell pain. Refilled today.  -She has resume crizanlizumab infusions which we will continue monthly  -Continue slow taper of oxycodone. Will decrease to 20 tablets per fill from 22. She can continue the frequency at every 4 hours with a max of 6 tablets per day but with the decreased tablets per refill should aim for a max of 4 tablets per day. She can self-reduce infusion days/week and we will continue to keep the cap at 2/week for now.  -Continue infusion  center visits limited to two times per week (Mondays and Fridays ideally although still needs to call to request). Continue diligent home management with current medications, heat, rest, compression, warm baths.   -If unable to manage at home can go to ED but continue to not do IV narcotics in the emergency room. Ketamine previously added to pain plan in ED  - Desferal infusions on hold. Continue Jadenu and check Ferriscan. Will likely need to resume infusions in the future.   -Continue aspirin BID  -RTC with me in 1 month, coordinate with [facility] infusions    *** minutes spent on the date of the encounter doing {2021 E&M time in:898235}     Patricia Mantilla CNP        Again, thank you for allowing me to participate in the care of your patient.        Sincerely,        Patricia Mantilla CNP

## 2023-08-14 ENCOUNTER — PATIENT OUTREACH (OUTPATIENT)
Dept: CARE COORDINATION | Facility: CLINIC | Age: 23
End: 2023-08-14
Payer: COMMERCIAL

## 2023-08-14 ENCOUNTER — INFUSION THERAPY VISIT (OUTPATIENT)
Dept: TRANSPLANT | Facility: CLINIC | Age: 23
End: 2023-08-14
Attending: PEDIATRICS
Payer: COMMERCIAL

## 2023-08-14 ENCOUNTER — NURSE TRIAGE (OUTPATIENT)
Dept: ONCOLOGY | Facility: CLINIC | Age: 23
End: 2023-08-14
Payer: COMMERCIAL

## 2023-08-14 VITALS
DIASTOLIC BLOOD PRESSURE: 71 MMHG | RESPIRATION RATE: 16 BRPM | HEART RATE: 80 BPM | TEMPERATURE: 98.1 F | OXYGEN SATURATION: 94 % | SYSTOLIC BLOOD PRESSURE: 106 MMHG

## 2023-08-14 DIAGNOSIS — D57.00 SICKLE CELL PAIN CRISIS (H): Primary | ICD-10-CM

## 2023-08-14 DIAGNOSIS — G81.10 SPASTIC HEMIPLEGIA, UNSPECIFIED ETIOLOGY, UNSPECIFIED LATERALITY (H): ICD-10-CM

## 2023-08-14 DIAGNOSIS — D57.00 SICKLE CELL PAIN CRISIS (H): ICD-10-CM

## 2023-08-14 PROCEDURE — 96374 THER/PROPH/DIAG INJ IV PUSH: CPT

## 2023-08-14 PROCEDURE — 250N000013 HC RX MED GY IP 250 OP 250 PS 637: Performed by: PEDIATRICS

## 2023-08-14 PROCEDURE — 96361 HYDRATE IV INFUSION ADD-ON: CPT

## 2023-08-14 PROCEDURE — 250N000011 HC RX IP 250 OP 636: Performed by: PEDIATRICS

## 2023-08-14 PROCEDURE — 258N000003 HC RX IP 258 OP 636: Performed by: PEDIATRICS

## 2023-08-14 PROCEDURE — 96376 TX/PRO/DX INJ SAME DRUG ADON: CPT

## 2023-08-14 RX ORDER — HEPARIN SODIUM,PORCINE 10 UNIT/ML
5 VIAL (ML) INTRAVENOUS
Status: CANCELLED | OUTPATIENT
Start: 2023-10-01

## 2023-08-14 RX ORDER — ONDANSETRON 8 MG/1
8 TABLET, ORALLY DISINTEGRATING ORAL
Status: COMPLETED | OUTPATIENT
Start: 2023-08-14 | End: 2023-08-14

## 2023-08-14 RX ORDER — ONDANSETRON 4 MG/1
8 TABLET, FILM COATED ORAL
Status: CANCELLED
Start: 2023-10-01

## 2023-08-14 RX ORDER — HEPARIN SODIUM (PORCINE) LOCK FLUSH IV SOLN 100 UNIT/ML 100 UNIT/ML
5 SOLUTION INTRAVENOUS
Status: DISCONTINUED | OUTPATIENT
Start: 2023-08-14 | End: 2023-08-14 | Stop reason: HOSPADM

## 2023-08-14 RX ORDER — DIPHENHYDRAMINE HCL 25 MG
25 CAPSULE ORAL
Status: CANCELLED
Start: 2023-10-01

## 2023-08-14 RX ORDER — HEPARIN SODIUM (PORCINE) LOCK FLUSH IV SOLN 100 UNIT/ML 100 UNIT/ML
5 SOLUTION INTRAVENOUS
Status: CANCELLED | OUTPATIENT
Start: 2023-10-01

## 2023-08-14 RX ORDER — OXYCODONE HYDROCHLORIDE 15 MG/1
15 TABLET ORAL EVERY 4 HOURS PRN
Qty: 15 TABLET | Refills: 0 | Status: SHIPPED | OUTPATIENT
Start: 2023-08-14 | End: 2023-08-21

## 2023-08-14 RX ORDER — DIPHENHYDRAMINE HCL 25 MG
25 CAPSULE ORAL
Status: COMPLETED | OUTPATIENT
Start: 2023-08-14 | End: 2023-08-14

## 2023-08-14 RX ADMIN — Medication 5 ML: at 13:15

## 2023-08-14 RX ADMIN — SODIUM CHLORIDE, POTASSIUM CHLORIDE, SODIUM LACTATE AND CALCIUM CHLORIDE 1000 ML: 600; 310; 30; 20 INJECTION, SOLUTION INTRAVENOUS at 11:11

## 2023-08-14 RX ADMIN — DIPHENHYDRAMINE HYDROCHLORIDE 25 MG: 25 CAPSULE ORAL at 11:15

## 2023-08-14 RX ADMIN — ONDANSETRON 8 MG: 8 TABLET, ORALLY DISINTEGRATING ORAL at 11:15

## 2023-08-14 RX ADMIN — HYDROMORPHONE HYDROCHLORIDE 1 MG: 1 INJECTION, SOLUTION INTRAMUSCULAR; INTRAVENOUS; SUBCUTANEOUS at 13:05

## 2023-08-14 RX ADMIN — HYDROMORPHONE HYDROCHLORIDE 1 MG: 1 INJECTION, SOLUTION INTRAMUSCULAR; INTRAVENOUS; SUBCUTANEOUS at 12:09

## 2023-08-14 RX ADMIN — HYDROMORPHONE HYDROCHLORIDE 1 MG: 1 INJECTION, SOLUTION INTRAMUSCULAR; INTRAVENOUS; SUBCUTANEOUS at 11:08

## 2023-08-14 NOTE — PROGRESS NOTES
Infusion Nursing Note:  Jennifer Cervantes presents today for IV fluids and pain medication.    Patient seen by provider today: No   present during visit today: Not Applicable.    Note:     Pt received a liter of LR over two hours.    Pt received dilaudid 1 mg every hour for 3 doses (3mg dilaudid total). Pt received 8 mg sublingual zofran and 25 mg benadryl.      Intravenous Access:  Implanted Port.    Treatment Conditions:  Not Applicable.      Post Infusion Assessment:  Patient tolerated infusion without incident.  Blood return noted pre and post infusion.  Site patent and intact, free from redness, edema or discomfort.  No evidence of extravasations.  Access discontinued per protocol.       Discharge Plan:   Prescription refills for oxycodone brought to patient by pharmacy staff.  Discharge instructions reviewed with: Patient.  Patient and/or family verbalized understanding of discharge instructions and all questions answered.  Patient discharged in stable condition accompanied by: self.  Departure Mode: Ambulatory.      Jamaica Napoles RN

## 2023-08-14 NOTE — NURSING NOTE
"Oncology Rooming Note    August 14, 2023 1:59 PM   Jennifer Cervantes is a 23 year old female who presents for:    Chief Complaint   Patient presents with    Infusion     Add on IV fluids and pain medications. Hx sickle cell disease.     Initial Vitals: There were no vitals taken for this visit. Estimated body mass index is 24.77 kg/m  as calculated from the following:    Height as of 5/10/23: 1.626 m (5' 4\").    Weight as of 8/11/23: 65.5 kg (144 lb 4.8 oz). There is no height or weight on file to calculate BSA.  Data Unavailable Comment: Data Unavailable   No LMP recorded. Patient has had an implant.  Allergies reviewed: Yes  Medications reviewed: Yes    Medications: refill for oxycodone available in pharmacy- brought up to patient by pharmacy staff.  Pharmacy name entered into EPIC: [facility] [place], [place] - [address] [facility] [id number]    Clinical concerns: none       Jamaica Napoles RN              "

## 2023-08-14 NOTE — PROGRESS NOTES
Social Work - Transportation  [facility]    Data/Intervention:  Patient Name: Jennifer Cervantes Goes By: Jennifer    /Age: 2000 (23 year old)    Referral From: Ange Abbasi RN  Reason for Referral:  support requested for patient transportation needs for today's appointment at 11:30 am.  Assessment:  called [employer] to arrange ride through patient's insurance. [employer] arranged  for patient from home with [employer]. Patient will need to call [phone] when ready for return ride home.  Plan: Patient is aware of the transportation plan.  available to assist with any other needs.    CARLOS Chavez,[facility]  Hematology/Oncology Social Worker  Phone:[phone] Pager: [phone]

## 2023-08-14 NOTE — TELEPHONE ENCOUNTER
Oncology Nurse Triage - Sickle Cell Pain Crisis:  Situation: Jennifer  calling about Sickle Cell Pain Crisis, requesting to be added on for IV fluids and pain medicine    Background:   Patient's last infusion was 8/11/23  Last clinic visit date:8/11/23 with Patricia Mantilla CNP  Does patient have active treatment plan?  Yes    Assessment of Symptoms:  Onset/Duration of symptoms: 2 days    Is it typical sickle cell pain? Yes   Location: Bilateral arms R>L, got SERA last Friday and thinks it gave her a flare up  Character: Sharp           Intensity: pain intensity is moderate at time of call d/t taking meds an hour earlier, gets severe, 10/10    Any radiation of pain, numbness, tingling, weakness, warmth, swelling, discoloration of arms or legs?No   Fever?No  Chest Pain Present: No   Shortness of breath: No     Other home therapies tried: HEAT/HEATING PAD and two hot showers didn't help      Last home medication taken and when: 0600, took oxycodone, ibuprofen and all other meds    Any Refills Needed?: Yes Needs Oxycodone refill: will be out this afternoon, no se, 1 tab Q6H, pended in separate refill encounter.    Does patient have transportation & length of time to get to clinic: No Is 20-25 min away from clinic    Recommendations:   Advised pt to go to ED if sx worsen while she is waiting for call back from Triage. Pt voiced understanding of this and is in agreement.     If you do not hear from the infusion center by 2pm then you will not be able to get in for an infusion today. If symptoms worsen while waiting for call back, and/or you experience fever, chills, SOB, chest pain, cough, n/v, dizziness, numbness, swelling, discoloration of extremities, then seek emergency evaluation in Emergency Department.     Added to infusion wait list.    0807: Per Staci, BMT can offer apt at 1130.  0816: Called Jennifer who confirmed she can come to clinic today at 1130.  Message sent to  to assist with transportation.  Message sent to  CCOD to schedule pt.  Update given to BMT charge nurse.    Routed to Patricia Mantilla CNP and Arely Krueger RNCC

## 2023-08-14 NOTE — TELEPHONE ENCOUNTER
Narcotic Refill Request    Medication(s) requested:  oxycodone 15 mg tablet  Person Requesting Refill:Jennifer  What pain is the medication treating: sickle cell pain  How is the medication being taken?:1 tab Q 6H  Does pt have enough for today? Yes, but will be out after today  Is pain being adequately controlled on the current regimen?: yes, but today is in a lot of pain  Experiencing any side effects from medication?: no    Date of most recent appointment:  8/11/23 with Patricia Mantilla CNP  Any No Show Visits:no  Next appointment:   9/8/23 with Patricia Mantilla CNP  Last fill date and by whom:  Patricia Mantilla CNP on 8/11/23   Reviewed: No    Routed/Paged provider: Patricia Mantilla CNP

## 2023-08-15 ENCOUNTER — HOSPITAL ENCOUNTER (EMERGENCY)
Facility: CLINIC | Age: 23
Discharge: HOME | End: 2023-08-15
Attending: EMERGENCY MEDICINE | Admitting: EMERGENCY MEDICINE
Payer: COMMERCIAL

## 2023-08-15 ENCOUNTER — HOSPITAL ENCOUNTER (INPATIENT)
Facility: CLINIC | Age: 23
LOS: 4 days | Discharge: HOME | DRG: 812 | End: 2023-08-20
Attending: EMERGENCY MEDICINE | Admitting: INTERNAL MEDICINE
Payer: COMMERCIAL

## 2023-08-15 VITALS
HEART RATE: 88 BPM | TEMPERATURE: 98.6 F | DIASTOLIC BLOOD PRESSURE: 62 MMHG | SYSTOLIC BLOOD PRESSURE: 117 MMHG | RESPIRATION RATE: 20 BRPM | OXYGEN SATURATION: 93 %

## 2023-08-15 DIAGNOSIS — D57.00 SICKLE CELL PAIN CRISIS (H): ICD-10-CM

## 2023-08-15 DIAGNOSIS — R07.9 CHEST PAIN, UNSPECIFIED TYPE: Primary | ICD-10-CM

## 2023-08-15 LAB
ANION GAP SERPL CALCULATED.3IONS-SCNC: 12 MMOL/L (ref 7–15)
BASOPHILS # BLD AUTO: 0.2 10E3/UL (ref 0–0.2)
BASOPHILS NFR BLD AUTO: 1 %
BUN SERPL-MCNC: 4.9 MG/DL (ref 6–20)
CALCIUM SERPL-MCNC: 8.8 MG/DL (ref 8.6–10)
CHLORIDE SERPL-SCNC: 102 MMOL/L (ref 98–107)
CREAT SERPL-MCNC: 0.52 MG/DL (ref 0.51–0.95)
DEPRECATED HCO3 PLAS-SCNC: 20 MMOL/L (ref 22–29)
EOSINOPHIL # BLD AUTO: 0 10E3/UL (ref 0–0.7)
EOSINOPHIL NFR BLD AUTO: 0 %
ERYTHROCYTE [DISTWIDTH] IN BLOOD BY AUTOMATED COUNT: 29.5 % (ref 10–15)
GFR SERPL CREATININE-BSD FRML MDRD: >90 ML/MIN/1.73M2
GLUCOSE SERPL-MCNC: 101 MG/DL (ref 70–99)
HCT VFR BLD AUTO: 19.9 % (ref 35–47)
HGB BLD-MCNC: 6.9 G/DL (ref 11.7–15.7)
HOLD SPECIMEN: NORMAL
IMM GRANULOCYTES # BLD: 0.1 10E3/UL
IMM GRANULOCYTES NFR BLD: 1 %
LYMPHOCYTES # BLD AUTO: 1.7 10E3/UL (ref 0.8–5.3)
LYMPHOCYTES NFR BLD AUTO: 10 %
MCH RBC QN AUTO: 28.8 PG (ref 26.5–33)
MCHC RBC AUTO-ENTMCNC: 34.7 G/DL (ref 31.5–36.5)
MCV RBC AUTO: 83 FL (ref 78–100)
MONOCYTES # BLD AUTO: 1.7 10E3/UL (ref 0–1.3)
MONOCYTES NFR BLD AUTO: 9 %
NEUTROPHILS # BLD AUTO: 14.6 10E3/UL (ref 1.6–8.3)
NEUTROPHILS NFR BLD AUTO: 79 %
NRBC # BLD AUTO: 0.6 10E3/UL
NRBC BLD AUTO-RTO: 3 /100
PLATELET # BLD AUTO: 420 10E3/UL (ref 150–450)
POTASSIUM SERPL-SCNC: 4.1 MMOL/L (ref 3.4–5.3)
RBC # BLD AUTO: 2.4 10E6/UL (ref 3.8–5.2)
RETICS # AUTO: 0.47 10E6/UL (ref 0.03–0.1)
RETICS/RBC NFR AUTO: 20.1 % (ref 0.5–2)
SODIUM SERPL-SCNC: 134 MMOL/L (ref 136–145)
WBC # BLD AUTO: 18.3 10E3/UL (ref 4–11)

## 2023-08-15 PROCEDURE — 99285 EMERGENCY DEPT VISIT HI MDM: CPT | Mod: 25 | Performed by: EMERGENCY MEDICINE

## 2023-08-15 PROCEDURE — 250N000009 HC RX 250: Performed by: EMERGENCY MEDICINE

## 2023-08-15 PROCEDURE — 80048 BASIC METABOLIC PNL TOTAL CA: CPT | Performed by: EMERGENCY MEDICINE

## 2023-08-15 PROCEDURE — 36415 COLL VENOUS BLD VENIPUNCTURE: CPT | Performed by: EMERGENCY MEDICINE

## 2023-08-15 PROCEDURE — 250N000011 HC RX IP 250 OP 636: Mod: JZ | Performed by: EMERGENCY MEDICINE

## 2023-08-15 PROCEDURE — 85045 AUTOMATED RETICULOCYTE COUNT: CPT | Performed by: EMERGENCY MEDICINE

## 2023-08-15 PROCEDURE — 96374 THER/PROPH/DIAG INJ IV PUSH: CPT | Performed by: EMERGENCY MEDICINE

## 2023-08-15 PROCEDURE — 96361 HYDRATE IV INFUSION ADD-ON: CPT | Performed by: EMERGENCY MEDICINE

## 2023-08-15 PROCEDURE — 99284 EMERGENCY DEPT VISIT MOD MDM: CPT | Performed by: EMERGENCY MEDICINE

## 2023-08-15 PROCEDURE — 85025 COMPLETE CBC W/AUTO DIFF WBC: CPT | Performed by: EMERGENCY MEDICINE

## 2023-08-15 PROCEDURE — 96376 TX/PRO/DX INJ SAME DRUG ADON: CPT | Performed by: EMERGENCY MEDICINE

## 2023-08-15 PROCEDURE — 96375 TX/PRO/DX INJ NEW DRUG ADDON: CPT | Performed by: EMERGENCY MEDICINE

## 2023-08-15 PROCEDURE — 99284 EMERGENCY DEPT VISIT MOD MDM: CPT | Mod: 25 | Performed by: EMERGENCY MEDICINE

## 2023-08-15 PROCEDURE — 93005 ELECTROCARDIOGRAM TRACING: CPT | Performed by: EMERGENCY MEDICINE

## 2023-08-15 PROCEDURE — 250N000013 HC RX MED GY IP 250 OP 250 PS 637: Performed by: EMERGENCY MEDICINE

## 2023-08-15 PROCEDURE — 258N000003 HC RX IP 258 OP 636: Performed by: EMERGENCY MEDICINE

## 2023-08-15 PROCEDURE — 93010 ELECTROCARDIOGRAM REPORT: CPT | Performed by: EMERGENCY MEDICINE

## 2023-08-15 RX ORDER — HEPARIN SODIUM (PORCINE) LOCK FLUSH IV SOLN 100 UNIT/ML 100 UNIT/ML
100 SOLUTION INTRAVENOUS ONCE
Status: COMPLETED | OUTPATIENT
Start: 2023-08-15 | End: 2023-08-15

## 2023-08-15 RX ORDER — ONDANSETRON 2 MG/ML
4 INJECTION INTRAMUSCULAR; INTRAVENOUS EVERY 30 MIN PRN
Status: DISCONTINUED | OUTPATIENT
Start: 2023-08-15 | End: 2023-08-15 | Stop reason: HOSPADM

## 2023-08-15 RX ORDER — SODIUM CHLORIDE, SODIUM LACTATE, POTASSIUM CHLORIDE, CALCIUM CHLORIDE 600; 310; 30; 20 MG/100ML; MG/100ML; MG/100ML; MG/100ML
INJECTION, SOLUTION INTRAVENOUS CONTINUOUS
Status: DISCONTINUED | OUTPATIENT
Start: 2023-08-15 | End: 2023-08-15 | Stop reason: HOSPADM

## 2023-08-15 RX ORDER — KETOROLAC TROMETHAMINE 15 MG/ML
15 INJECTION, SOLUTION INTRAMUSCULAR; INTRAVENOUS ONCE
Status: COMPLETED | OUTPATIENT
Start: 2023-08-15 | End: 2023-08-15

## 2023-08-15 RX ADMIN — ONDANSETRON 4 MG: 2 INJECTION INTRAMUSCULAR; INTRAVENOUS at 05:41

## 2023-08-15 RX ADMIN — OXYCODONE HYDROCHLORIDE 15 MG: 5 TABLET ORAL at 05:27

## 2023-08-15 RX ADMIN — Medication 4 MG: at 05:41

## 2023-08-15 RX ADMIN — HEPARIN 100 UNITS: 100 SYRINGE at 10:11

## 2023-08-15 RX ADMIN — SODIUM CHLORIDE, POTASSIUM CHLORIDE, SODIUM LACTATE AND CALCIUM CHLORIDE: 600; 310; 30; 20 INJECTION, SOLUTION INTRAVENOUS at 05:27

## 2023-08-15 RX ADMIN — Medication 4 MG: at 07:32

## 2023-08-15 RX ADMIN — KETOROLAC TROMETHAMINE 15 MG: 15 INJECTION, SOLUTION INTRAMUSCULAR; INTRAVENOUS at 05:27

## 2023-08-15 ASSESSMENT — ACTIVITIES OF DAILY LIVING (ADL)
ADLS_ACUITY_SCORE: 35
ADLS_ACUITY_SCORE: 35

## 2023-08-15 NOTE — DISCHARGE INSTRUCTIONS
Continue current medications.    Follow-up with your hematologist as planned    Return to the emergency department if fever, worse, or other concerns.

## 2023-08-15 NOTE — ED PROVIDER NOTES
Patient received as sign-out at change of shift.  Please see initial note for complete details.  23 year old female who presented to the ED with sickle cell pain crisis.  She was just given her second dose of ketamine at time of signout.  On reassessment now, the patient reports that her pain is well controlled, she is feeling better, and she is requesting discharge.        Francesco Green MD  08/15/23 0904

## 2023-08-15 NOTE — ED TRIAGE NOTES
Pt came into the ER due to a sickle cell crisis  Pt stated she has 10 out of 10 pain  Pt had an infusion yesterday that helped a little

## 2023-08-15 NOTE — ED PROVIDER NOTES
ED Provider Note  [facility]      History     Chief Complaint   Patient presents with    Sickle Cell Pain Crisis     HPI  Jennifer Cervantes is a 23 year old female who presents to us with chief complaint of sickle cell pain crisis.  This feels like a normal pain crisis to her.  She has a lot of pain in her back.  No fevers.  She does have nausea.  She notes generalized pain as well.  No chest pain.  No injuries.  Past Medical History  Past Medical History:   Diagnosis Date    Anxiety     Bleeding disorder (H)     Blood clotting disorder (H)     Cerebral infarction (H) 2015    Cognitive developmental delay     low IQ. 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 side (H)     right hand contractures    Iron overload due to repeated red blood cell transfusions     Migraines     Multiple subsegmental pulmonary emboli without acute cor pulmonale (H) 02/01/2021    Oppositional defiant behavior     Presence of intrauterine contraceptive device 2/18/2020    Superficial venous thrombosis of arm, right 03/25/2021    Uncomplicated asthma      Past Surgical History:   Procedure Laterality Date    AS INSERT TUNNELED CV 2 CATH W/O PORT/PUMP      CHOLECYSTECTOMY      CV RIGHT HEART CATH MEASUREMENTS RECORDED N/A 11/18/2021    Procedure: Right Heart Cath;  Surgeon: Jackson Stauffer MD;  Location:  HEART CARDIAC CATH LAB    INSERT PORT VASCULAR ACCESS Left 4/21/2021    Procedure: INSERTION, VASCULAR ACCESS PORT (NOT SURE ON SIDE UNTIL REMOVAL);  Surgeon: Rajan More MD;  Location: [facility] OR    IR CHEST PORT PLACEMENT > 5 YRS OF AGE  4/21/2021    IR CVC NON TUNNEL LINE REMOVAL  5/6/2021    IR CVC NON TUNNEL PLACEMENT > 5 YRS  04/07/2020    IR CVC NON TUNNEL PLACEMENT > 5 YRS  4/30/2021    IR CVC NON TUNNEL PLACEMENT > 5 YRS  9/7/2022    IR PORT REMOVAL LEFT  4/21/2021    REMOVE PORT  VASCULAR ACCESS Left 4/21/2021    Procedure: REMOVAL, VASCULAR ACCESS PORT LEFT;  Surgeon: Rajan More MD;  Location: [facility] OR    REPAIR TENDON ELBOW Right 10/02/2019    Procedure: Right Elbow Flexor Lengthening, Flexor Pronator Slide Of Wrist and Finger, Thumb Adductor Lengthening;  Surgeon: Anai Franco MD;  Location: [facility] OR    TONSILLECTOMY Bilateral 10/02/2019    Procedure: Bilateral Tonsillectomy;  Surgeon: Farhana Guy MD;  Location: [facility] OR    ZZC BREAST REDUCTION (INCLUDES LIPO) TIER 3 Bilateral 04/23/2019     acetaminophen (TYLENOL) 325 MG tablet  albuterol (PROAIR HFA/PROVENTIL HFA/VENTOLIN HFA) 108 (90 Base) MCG/ACT inhaler  albuterol (PROVENTIL) (2.5 MG/3ML) 0.083% neb solution  aspirin (ASA) 81 MG chewable tablet  budesonide-formoterol (SYMBICORT) 160-4.5 MCG/ACT Inhaler  cetirizine (ZYRTEC) 10 MG tablet  deferasirox (JADENU) 360 MG tablet  diphenhydrAMINE (BENADRYL) 25 MG capsule  EPINEPHrine (ANY BX GENERIC EQUIV) 0.3 MG/0.3ML injection 2-pack  FLUoxetine (PROZAC) 10 MG capsule  Hydroxyurea 1000 MG TABS  naloxone (NARCAN) 4 MG/0.1ML nasal spray  ondansetron (ZOFRAN) 8 MG tablet  oxyCODONE IR (ROXICODONE) 15 MG tablet  traZODone (DESYREL) 50 MG tablet      Allergies   Allergen Reactions    Contrast Dye      Hives and breathing issues    Fish-Derived Products Hives    Seafood Hives    Gadolinium     Iodinated Contrast Media      Family History  Family History   Problem Relation Age of Onset    Sickle Cell Trait Mother     Hypertension Mother     Asthma Mother     Sickle Cell Trait Father     Glaucoma No family hx of     Macular Degeneration No family hx of     Diabetes No family hx of     Gout No family hx of      Social History   Social History     Tobacco Use    Smoking status: Never     Passive exposure: Never    Smokeless tobacco: Never   Substance Use Topics    Alcohol use: Not Currently     Alcohol/week: 0.0 standard drinks of alcohol    Drug use: Never         A medically  appropriate review of systems was performed with pertinent positives and negatives noted in the HPI, and all other systems negative.    Physical Exam   BP: 119/71  Pulse: 100  Temp: 98.6  F (37  C)  Resp: 18  SpO2: 90 %  Physical Exam  Constitutional:       General: She is not in acute distress.     Appearance: She is ill-appearing. She is not diaphoretic.   HENT:      Head: Normocephalic and atraumatic.      Right Ear: External ear normal.      Left Ear: External ear normal.      Nose: Nose normal.   Eyes:      Extraocular Movements: Extraocular movements intact.      Conjunctiva/sclera: Conjunctivae normal.   Cardiovascular:      Rate and Rhythm: Normal rate and regular rhythm.      Heart sounds: Normal heart sounds.   Pulmonary:      Effort: Pulmonary effort is normal. No respiratory distress.      Breath sounds: Normal breath sounds.   Abdominal:      General: There is no distension.      Palpations: Abdomen is soft.      Tenderness: There is no abdominal tenderness.   Musculoskeletal:         General: No swelling or deformity.      Cervical back: Normal range of motion and neck supple.   Skin:     General: Skin is warm and dry.   Neurological:      Mental Status: Mental status is at baseline.      Comments: Alert, oriented   Psychiatric:         Mood and Affect: Mood normal.         Behavior: Behavior normal.           ED Course, Procedures, & Data      Procedures              Results for orders placed or performed during the hospital encounter of 08/15/23   [place] Draw     Status: None    Narrative    The following orders were created for panel order [place] Draw.  Procedure                               Abnormality         Status                     ---------                               -----------         ------                     Extra Blue Top Tube[[id number]]                              Final result               Extra Red Top Tube[[id number]]                               Final result                Extra Green Top (Lithium...[[id number]]                      Final result               Extra Purple Top Tube[[id number]]                            Final result                 Please view results for these tests on the individual orders.   Extra Blue Top Tube     Status: None   Result Value Ref Range    Hold Specimen JIC    Extra Red Top Tube     Status: None   Result Value Ref Range    Hold Specimen JIC    Extra Green Top (Lithium Heparin) Tube     Status: None   Result Value Ref Range    Hold Specimen JIC    Extra Purple Top Tube     Status: None   Result Value Ref Range    Hold Specimen JIC    Basic metabolic panel     Status: Abnormal   Result Value Ref Range    Sodium 134 (L) 136 - 145 mmol/L    Potassium 4.1 3.4 - 5.3 mmol/L    Chloride 102 98 - 107 mmol/L    Carbon Dioxide (CO2) 20 (L) 22 - 29 mmol/L    Anion Gap 12 7 - 15 mmol/L    Urea Nitrogen 4.9 (L) 6.0 - 20.0 mg/dL    Creatinine 0.52 0.51 - 0.95 mg/dL    Calcium 8.8 8.6 - 10.0 mg/dL    Glucose 101 (H) 70 - 99 mg/dL    GFR Estimate >90 >60 mL/min/1.73m2   Reticulocyte count     Status: Abnormal   Result Value Ref Range    % Reticulocyte 20.1 (H) 0.5 - 2.0 %    Absolute Reticulocyte 0.473 (H) 0.025 - 0.095 10e6/uL   CBC with platelets and differential     Status: Abnormal   Result Value Ref Range    WBC Count 18.3 (H) 4.0 - 11.0 10e3/uL    RBC Count 2.40 (L) 3.80 - 5.20 10e6/uL    Hemoglobin 6.9 (LL) 11.7 - 15.7 g/dL    Hematocrit 19.9 (L) 35.0 - 47.0 %    MCV 83 78 - 100 fL    MCH 28.8 26.5 - 33.0 pg    MCHC 34.7 31.5 - 36.5 g/dL    RDW 29.5 (H) 10.0 - 15.0 %    Platelet Count 420 150 - 450 10e3/uL    % Neutrophils 79 %    % Lymphocytes 10 %    % Monocytes 9 %    % Eosinophils 0 %    % Basophils 1 %    % Immature Granulocytes 1 %    NRBCs per 100 WBC 3 (H) <1 /100    Absolute Neutrophils 14.6 (H) 1.6 - 8.3 10e3/uL    Absolute Lymphocytes 1.7 0.8 - 5.3 10e3/uL    Absolute Monocytes 1.7 (H) 0.0 - 1.3 10e3/uL    Absolute Eosinophils 0.0 0.0 - 0.7 10e3/uL     Absolute Basophils 0.2 0.0 - 0.2 10e3/uL    Absolute Immature Granulocytes 0.1 <=0.4 10e3/uL    Absolute NRBCs 0.6 10e3/uL   CBC with platelets differential     Status: Abnormal    Narrative    The following orders were created for panel order CBC with platelets differential.  Procedure                               Abnormality         Status                     ---------                               -----------         ------                     CBC with platelets and d...[[id number]]  Abnormal            Final result                 Please view results for these tests on the individual orders.     Medications   oxyCODONE (ROXICODONE) tablet 15 mg (15 mg Oral $Given 8/15/23 0527)   ketorolac (TORADOL) injection 15 mg (15 mg Intravenous $Given 8/15/23 0527)   ketamine (KETALAR) injection 4 mg (4 mg Intravenous $Given 8/15/23 0732)   heparin 100 unit/mL injection 100 Units (100 Units Intravenous $Given 8/15/23 1011)     Labs Ordered and Resulted from Time of ED Arrival to Time of ED Departure   BASIC METABOLIC PANEL - Abnormal       Result Value    Sodium 134 (*)     Potassium 4.1      Chloride 102      Carbon Dioxide (CO2) 20 (*)     Anion Gap 12      Urea Nitrogen 4.9 (*)     Creatinine 0.52      Calcium 8.8      Glucose 101 (*)     GFR Estimate >90     RETICULOCYTE COUNT - Abnormal    % Reticulocyte 20.1 (*)     Absolute Reticulocyte 0.473 (*)    CBC WITH PLATELETS AND DIFFERENTIAL - Abnormal    WBC Count 18.3 (*)     RBC Count 2.40 (*)     Hemoglobin 6.9 (*)     Hematocrit 19.9 (*)     MCV 83      MCH 28.8      MCHC 34.7      RDW 29.5 (*)     Platelet Count 420      % Neutrophils 79      % Lymphocytes 10      % Monocytes 9      % Eosinophils 0      % Basophils 1      % Immature Granulocytes 1      NRBCs per 100 WBC 3 (*)     Absolute Neutrophils 14.6 (*)     Absolute Lymphocytes 1.7      Absolute Monocytes 1.7 (*)     Absolute Eosinophils 0.0      Absolute Basophils 0.2      Absolute Immature Granulocytes 0.1       Absolute NRBCs 0.6       No orders to display          Medical Decision Making  The patient's presentation is strongly suggestive of high complexity (a chronic illness severe exacerbation, progression, or side effect of treatment).    The patient's evaluation involved:  review of external note(s) from 3+ sources (Most recent H&P in addition to clinic and ED note)  review of 2 test result(s) ordered prior to this encounter (Most recent BMP and CBC)    The patient's management involved high risk (a parenteral controlled substance).    Assessment & Plan    23-year-old female presents to us with a chief complaint of sickle cell pain crisis.  She was treated with her previously established care plan.  This included oxycodone, ketamine, Zofran, fluids.  Labs are stable.  Patient care will be signed over the oncoming physician pending disposition decision    I have reviewed the nursing notes. I have reviewed the findings, diagnosis, plan and need for follow up with the patient.    Discharge Medication List as of 8/15/2023 10:01 AM          Final diagnoses:   Sickle cell pain crisis (H)       Mykel Luz  [facility] EMERGENCY DEPARTMENT  8/15/2023     Mykel Luz,   08/16/23 0039

## 2023-08-16 ENCOUNTER — TELEPHONE (OUTPATIENT)
Dept: ONCOLOGY | Facility: CLINIC | Age: 23
End: 2023-08-16

## 2023-08-16 ENCOUNTER — APPOINTMENT (OUTPATIENT)
Dept: GENERAL RADIOLOGY | Facility: CLINIC | Age: 23
DRG: 812 | End: 2023-08-16
Attending: EMERGENCY MEDICINE
Payer: COMMERCIAL

## 2023-08-16 LAB
ALBUMIN SERPL BCG-MCNC: 4.2 G/DL (ref 3.5–5.2)
ALBUMIN SERPL BCG-MCNC: 4.5 G/DL (ref 3.5–5.2)
ALBUMIN UR-MCNC: NEGATIVE MG/DL
ALP SERPL-CCNC: 65 U/L (ref 35–104)
ALP SERPL-CCNC: 73 U/L (ref 35–104)
ALT SERPL W P-5'-P-CCNC: 20 U/L (ref 0–50)
ALT SERPL W P-5'-P-CCNC: 22 U/L (ref 0–50)
ANION GAP SERPL CALCULATED.3IONS-SCNC: 11 MMOL/L (ref 7–15)
ANION GAP SERPL CALCULATED.3IONS-SCNC: 11 MMOL/L (ref 7–15)
APPEARANCE UR: CLEAR
AST SERPL W P-5'-P-CCNC: 42 U/L (ref 0–45)
AST SERPL W P-5'-P-CCNC: 46 U/L (ref 0–45)
ATRIAL RATE - MUSE: 104 BPM
BACTERIA #/AREA URNS HPF: ABNORMAL /HPF
BASOPHILS # BLD AUTO: 0.1 10E3/UL (ref 0–0.2)
BASOPHILS # BLD AUTO: 0.1 10E3/UL (ref 0–0.2)
BASOPHILS NFR BLD AUTO: 1 %
BASOPHILS NFR BLD AUTO: 1 %
BILIRUB SERPL-MCNC: 5.2 MG/DL
BILIRUB SERPL-MCNC: 5.7 MG/DL
BILIRUB UR QL STRIP: NEGATIVE
BUN SERPL-MCNC: 5.2 MG/DL (ref 6–20)
BUN SERPL-MCNC: 6.6 MG/DL (ref 6–20)
CALCIUM SERPL-MCNC: 8.6 MG/DL (ref 8.6–10)
CALCIUM SERPL-MCNC: 8.8 MG/DL (ref 8.6–10)
CHLORIDE SERPL-SCNC: 102 MMOL/L (ref 98–107)
CHLORIDE SERPL-SCNC: 102 MMOL/L (ref 98–107)
COLOR UR AUTO: ABNORMAL
CREAT SERPL-MCNC: 0.54 MG/DL (ref 0.51–0.95)
CREAT SERPL-MCNC: 0.55 MG/DL (ref 0.51–0.95)
DEPRECATED HCO3 PLAS-SCNC: 21 MMOL/L (ref 22–29)
DEPRECATED HCO3 PLAS-SCNC: 22 MMOL/L (ref 22–29)
DIASTOLIC BLOOD PRESSURE - MUSE: NORMAL MMHG
EOSINOPHIL # BLD AUTO: 0 10E3/UL (ref 0–0.7)
EOSINOPHIL # BLD AUTO: 0.2 10E3/UL (ref 0–0.7)
EOSINOPHIL NFR BLD AUTO: 0 %
EOSINOPHIL NFR BLD AUTO: 1 %
ERYTHROCYTE [DISTWIDTH] IN BLOOD BY AUTOMATED COUNT: 28.5 % (ref 10–15)
ERYTHROCYTE [DISTWIDTH] IN BLOOD BY AUTOMATED COUNT: 29.2 % (ref 10–15)
GFR SERPL CREATININE-BSD FRML MDRD: >90 ML/MIN/1.73M2
GFR SERPL CREATININE-BSD FRML MDRD: >90 ML/MIN/1.73M2
GLUCOSE SERPL-MCNC: 90 MG/DL (ref 70–99)
GLUCOSE SERPL-MCNC: 94 MG/DL (ref 70–99)
GLUCOSE UR STRIP-MCNC: NEGATIVE MG/DL
HCT VFR BLD AUTO: 18.4 % (ref 35–47)
HCT VFR BLD AUTO: 20.2 % (ref 35–47)
HGB BLD-MCNC: 6.2 G/DL (ref 11.7–15.7)
HGB BLD-MCNC: 7 G/DL (ref 11.7–15.7)
HGB UR QL STRIP: NEGATIVE
IMM GRANULOCYTES # BLD: 0.1 10E3/UL
IMM GRANULOCYTES # BLD: 0.2 10E3/UL
IMM GRANULOCYTES NFR BLD: 1 %
IMM GRANULOCYTES NFR BLD: 1 %
INTERPRETATION ECG - MUSE: NORMAL
KETONES UR STRIP-MCNC: NEGATIVE MG/DL
LACTATE SERPL-SCNC: 0.4 MMOL/L (ref 0.7–2)
LEUKOCYTE ESTERASE UR QL STRIP: NEGATIVE
LYMPHOCYTES # BLD AUTO: 0.9 10E3/UL (ref 0.8–5.3)
LYMPHOCYTES # BLD AUTO: 1.2 10E3/UL (ref 0.8–5.3)
LYMPHOCYTES NFR BLD AUTO: 5 %
LYMPHOCYTES NFR BLD AUTO: 7 %
MCH RBC QN AUTO: 29.1 PG (ref 26.5–33)
MCH RBC QN AUTO: 30 PG (ref 26.5–33)
MCHC RBC AUTO-ENTMCNC: 33.7 G/DL (ref 31.5–36.5)
MCHC RBC AUTO-ENTMCNC: 34.7 G/DL (ref 31.5–36.5)
MCV RBC AUTO: 86 FL (ref 78–100)
MCV RBC AUTO: 87 FL (ref 78–100)
MONOCYTES # BLD AUTO: 1.5 10E3/UL (ref 0–1.3)
MONOCYTES # BLD AUTO: 1.5 10E3/UL (ref 0–1.3)
MONOCYTES NFR BLD AUTO: 8 %
MONOCYTES NFR BLD AUTO: 9 %
NEUTROPHILS # BLD AUTO: 13.9 10E3/UL (ref 1.6–8.3)
NEUTROPHILS # BLD AUTO: 15.1 10E3/UL (ref 1.6–8.3)
NEUTROPHILS NFR BLD AUTO: 82 %
NEUTROPHILS NFR BLD AUTO: 84 %
NITRATE UR QL: NEGATIVE
NRBC # BLD AUTO: 0.5 10E3/UL
NRBC # BLD AUTO: 0.6 10E3/UL
NRBC BLD AUTO-RTO: 3 /100
NRBC BLD AUTO-RTO: 3 /100
P AXIS - MUSE: 69 DEGREES
PH UR STRIP: 6.5 [PH] (ref 5–7)
PLATELET # BLD AUTO: 447 10E3/UL (ref 150–450)
PLATELET # BLD AUTO: 467 10E3/UL (ref 150–450)
POTASSIUM SERPL-SCNC: 3.8 MMOL/L (ref 3.4–5.3)
POTASSIUM SERPL-SCNC: 3.8 MMOL/L (ref 3.4–5.3)
PR INTERVAL - MUSE: 140 MS
PROCALCITONIN SERPL IA-MCNC: 0.18 NG/ML
PROT SERPL-MCNC: 7 G/DL (ref 6.4–8.3)
PROT SERPL-MCNC: 7.8 G/DL (ref 6.4–8.3)
QRS DURATION - MUSE: 72 MS
QT - MUSE: 350 MS
QTC - MUSE: 460 MS
R AXIS - MUSE: 44 DEGREES
RBC # BLD AUTO: 2.13 10E6/UL (ref 3.8–5.2)
RBC # BLD AUTO: 2.33 10E6/UL (ref 3.8–5.2)
RBC URINE: <1 /HPF
RETICS # AUTO: 0.54 10E6/UL (ref 0.03–0.1)
RETICS/RBC NFR AUTO: 23 % (ref 0.5–2)
SODIUM SERPL-SCNC: 134 MMOL/L (ref 136–145)
SODIUM SERPL-SCNC: 135 MMOL/L (ref 136–145)
SP GR UR STRIP: 1.01 (ref 1–1.03)
SQUAMOUS EPITHELIAL: 5 /HPF
SYSTOLIC BLOOD PRESSURE - MUSE: NORMAL MMHG
T AXIS - MUSE: 28 DEGREES
TROPONIN T SERPL HS-MCNC: <6 NG/L
UROBILINOGEN UR STRIP-MCNC: 4 MG/DL
VENTRICULAR RATE- MUSE: 104 BPM
WBC # BLD AUTO: 16.8 10E3/UL (ref 4–11)
WBC # BLD AUTO: 17.9 10E3/UL (ref 4–11)
WBC URINE: 1 /HPF

## 2023-08-16 PROCEDURE — 250N000011 HC RX IP 250 OP 636: Mod: JZ | Performed by: EMERGENCY MEDICINE

## 2023-08-16 PROCEDURE — 250N000011 HC RX IP 250 OP 636: Performed by: HOSPITALIST

## 2023-08-16 PROCEDURE — 71046 X-RAY EXAM CHEST 2 VIEWS: CPT

## 2023-08-16 PROCEDURE — 84155 ASSAY OF PROTEIN SERUM: CPT

## 2023-08-16 PROCEDURE — 36415 COLL VENOUS BLD VENIPUNCTURE: CPT

## 2023-08-16 PROCEDURE — 258N000003 HC RX IP 258 OP 636

## 2023-08-16 PROCEDURE — 81001 URINALYSIS AUTO W/SCOPE: CPT

## 2023-08-16 PROCEDURE — 120N000002 HC R&B MED SURG/OB UMMC

## 2023-08-16 PROCEDURE — 99223 1ST HOSP IP/OBS HIGH 75: CPT | Mod: AI | Performed by: INTERNAL MEDICINE

## 2023-08-16 PROCEDURE — 250N000013 HC RX MED GY IP 250 OP 250 PS 637

## 2023-08-16 PROCEDURE — 80053 COMPREHEN METABOLIC PANEL: CPT | Performed by: EMERGENCY MEDICINE

## 2023-08-16 PROCEDURE — 250N000009 HC RX 250

## 2023-08-16 PROCEDURE — 250N000009 HC RX 250: Performed by: EMERGENCY MEDICINE

## 2023-08-16 PROCEDURE — 258N000003 HC RX IP 258 OP 636: Performed by: EMERGENCY MEDICINE

## 2023-08-16 PROCEDURE — 84145 PROCALCITONIN (PCT): CPT

## 2023-08-16 PROCEDURE — 71046 X-RAY EXAM CHEST 2 VIEWS: CPT | Mod: 26 | Performed by: RADIOLOGY

## 2023-08-16 PROCEDURE — 85025 COMPLETE CBC W/AUTO DIFF WBC: CPT | Performed by: EMERGENCY MEDICINE

## 2023-08-16 PROCEDURE — 258N000003 HC RX IP 258 OP 636: Performed by: PHYSICIAN ASSISTANT

## 2023-08-16 PROCEDURE — 99222 1ST HOSP IP/OBS MODERATE 55: CPT | Mod: GC | Performed by: INTERNAL MEDICINE

## 2023-08-16 PROCEDURE — 36591 DRAW BLOOD OFF VENOUS DEVICE: CPT | Performed by: INTERNAL MEDICINE

## 2023-08-16 PROCEDURE — 85045 AUTOMATED RETICULOCYTE COUNT: CPT | Performed by: EMERGENCY MEDICINE

## 2023-08-16 PROCEDURE — 250N000011 HC RX IP 250 OP 636: Performed by: INTERNAL MEDICINE

## 2023-08-16 PROCEDURE — 85004 AUTOMATED DIFF WBC COUNT: CPT

## 2023-08-16 PROCEDURE — 84484 ASSAY OF TROPONIN QUANT: CPT | Performed by: EMERGENCY MEDICINE

## 2023-08-16 PROCEDURE — 250N000013 HC RX MED GY IP 250 OP 250 PS 637: Performed by: EMERGENCY MEDICINE

## 2023-08-16 PROCEDURE — 83605 ASSAY OF LACTIC ACID: CPT | Performed by: INTERNAL MEDICINE

## 2023-08-16 PROCEDURE — 36415 COLL VENOUS BLD VENIPUNCTURE: CPT | Performed by: EMERGENCY MEDICINE

## 2023-08-16 RX ORDER — AMOXICILLIN 250 MG
2 CAPSULE ORAL 2 TIMES DAILY PRN
Status: DISCONTINUED | OUTPATIENT
Start: 2023-08-16 | End: 2023-08-20 | Stop reason: HOSPADM

## 2023-08-16 RX ORDER — KETOROLAC TROMETHAMINE 30 MG/ML
30 INJECTION, SOLUTION INTRAMUSCULAR; INTRAVENOUS EVERY 6 HOURS PRN
Status: DISCONTINUED | OUTPATIENT
Start: 2023-08-16 | End: 2023-08-20 | Stop reason: HOSPADM

## 2023-08-16 RX ORDER — FLUOXETINE 10 MG/1
10 CAPSULE ORAL DAILY
Status: DISCONTINUED | OUTPATIENT
Start: 2023-08-16 | End: 2023-08-16

## 2023-08-16 RX ORDER — OXYCODONE HYDROCHLORIDE 10 MG/1
20 TABLET ORAL EVERY 4 HOURS PRN
Status: DISCONTINUED | OUTPATIENT
Start: 2023-08-16 | End: 2023-08-16

## 2023-08-16 RX ORDER — FLUTICASONE FUROATE AND VILANTEROL 200; 25 UG/1; UG/1
1 POWDER RESPIRATORY (INHALATION) DAILY
Status: DISCONTINUED | OUTPATIENT
Start: 2023-08-16 | End: 2023-08-20 | Stop reason: HOSPADM

## 2023-08-16 RX ORDER — DIPHENHYDRAMINE HCL 25 MG
25 CAPSULE ORAL EVERY 6 HOURS PRN
Status: DISCONTINUED | OUTPATIENT
Start: 2023-08-16 | End: 2023-08-16

## 2023-08-16 RX ORDER — AMOXICILLIN 250 MG
1-2 CAPSULE ORAL 2 TIMES DAILY
Status: DISCONTINUED | OUTPATIENT
Start: 2023-08-16 | End: 2023-08-20 | Stop reason: HOSPADM

## 2023-08-16 RX ORDER — OXYCODONE HYDROCHLORIDE 10 MG/1
20 TABLET ORAL EVERY 4 HOURS PRN
Status: DISCONTINUED | OUTPATIENT
Start: 2023-08-16 | End: 2023-08-20 | Stop reason: HOSPADM

## 2023-08-16 RX ORDER — LANOLIN ALCOHOL/MO/W.PET/CERES
3 CREAM (GRAM) TOPICAL
Status: DISCONTINUED | OUTPATIENT
Start: 2023-08-16 | End: 2023-08-20 | Stop reason: HOSPADM

## 2023-08-16 RX ORDER — SODIUM CHLORIDE 9 MG/ML
INJECTION, SOLUTION INTRAVENOUS CONTINUOUS
Status: DISCONTINUED | OUTPATIENT
Start: 2023-08-16 | End: 2023-08-17

## 2023-08-16 RX ORDER — SODIUM CHLORIDE, SODIUM LACTATE, POTASSIUM CHLORIDE, CALCIUM CHLORIDE 600; 310; 30; 20 MG/100ML; MG/100ML; MG/100ML; MG/100ML
INJECTION, SOLUTION INTRAVENOUS CONTINUOUS
Status: DISCONTINUED | OUTPATIENT
Start: 2023-08-16 | End: 2023-08-16

## 2023-08-16 RX ORDER — ASPIRIN 81 MG/1
81 TABLET, CHEWABLE ORAL 2 TIMES DAILY
Status: DISCONTINUED | OUTPATIENT
Start: 2023-08-16 | End: 2023-08-20 | Stop reason: HOSPADM

## 2023-08-16 RX ORDER — TRAZODONE HYDROCHLORIDE 50 MG/1
50 TABLET, FILM COATED ORAL AT BEDTIME
Status: DISCONTINUED | OUTPATIENT
Start: 2023-08-16 | End: 2023-08-16

## 2023-08-16 RX ORDER — ACETAMINOPHEN 325 MG/1
975 TABLET ORAL 3 TIMES DAILY
Status: DISCONTINUED | OUTPATIENT
Start: 2023-08-16 | End: 2023-08-20 | Stop reason: HOSPADM

## 2023-08-16 RX ORDER — CETIRIZINE HYDROCHLORIDE 10 MG/1
10 TABLET ORAL DAILY
Status: DISCONTINUED | OUTPATIENT
Start: 2023-08-16 | End: 2023-08-20 | Stop reason: HOSPADM

## 2023-08-16 RX ORDER — AMOXICILLIN 250 MG
1 CAPSULE ORAL 2 TIMES DAILY PRN
Status: DISCONTINUED | OUTPATIENT
Start: 2023-08-16 | End: 2023-08-20 | Stop reason: HOSPADM

## 2023-08-16 RX ORDER — NALOXONE HYDROCHLORIDE 0.4 MG/ML
0.4 INJECTION, SOLUTION INTRAMUSCULAR; INTRAVENOUS; SUBCUTANEOUS
Status: DISCONTINUED | OUTPATIENT
Start: 2023-08-16 | End: 2023-08-20 | Stop reason: HOSPADM

## 2023-08-16 RX ORDER — ALBUTEROL SULFATE 0.83 MG/ML
5 SOLUTION RESPIRATORY (INHALATION) EVERY 6 HOURS PRN
Status: DISCONTINUED | OUTPATIENT
Start: 2023-08-16 | End: 2023-08-20 | Stop reason: HOSPADM

## 2023-08-16 RX ORDER — KETOROLAC TROMETHAMINE 15 MG/ML
15 INJECTION, SOLUTION INTRAMUSCULAR; INTRAVENOUS ONCE
Status: COMPLETED | OUTPATIENT
Start: 2023-08-16 | End: 2023-08-16

## 2023-08-16 RX ORDER — HYDROXYUREA 500 MG/1
3000 CAPSULE ORAL DAILY
Status: DISCONTINUED | OUTPATIENT
Start: 2023-08-16 | End: 2023-08-20 | Stop reason: HOSPADM

## 2023-08-16 RX ORDER — ONDANSETRON 2 MG/ML
8 INJECTION INTRAMUSCULAR; INTRAVENOUS EVERY 6 HOURS PRN
Status: DISCONTINUED | OUTPATIENT
Start: 2023-08-16 | End: 2023-08-20 | Stop reason: HOSPADM

## 2023-08-16 RX ORDER — ENOXAPARIN SODIUM 100 MG/ML
40 INJECTION SUBCUTANEOUS EVERY 24 HOURS
Status: DISCONTINUED | OUTPATIENT
Start: 2023-08-16 | End: 2023-08-20 | Stop reason: HOSPADM

## 2023-08-16 RX ORDER — LIDOCAINE 40 MG/G
CREAM TOPICAL
Status: DISCONTINUED | OUTPATIENT
Start: 2023-08-16 | End: 2023-08-20 | Stop reason: HOSPADM

## 2023-08-16 RX ORDER — POLYETHYLENE GLYCOL 3350 17 G/17G
17 POWDER, FOR SOLUTION ORAL DAILY
Status: DISCONTINUED | OUTPATIENT
Start: 2023-08-16 | End: 2023-08-20 | Stop reason: HOSPADM

## 2023-08-16 RX ORDER — ONDANSETRON 2 MG/ML
4 INJECTION INTRAMUSCULAR; INTRAVENOUS EVERY 6 HOURS PRN
Status: DISCONTINUED | OUTPATIENT
Start: 2023-08-16 | End: 2023-08-16

## 2023-08-16 RX ORDER — KETAMINE HCL IN 0.9 % NACL 20 MG/2 ML
7.5 SYRINGE (ML) INTRAVENOUS 2 TIMES DAILY PRN
Status: DISCONTINUED | OUTPATIENT
Start: 2023-08-16 | End: 2023-08-20 | Stop reason: HOSPADM

## 2023-08-16 RX ORDER — NALOXONE HYDROCHLORIDE 0.4 MG/ML
0.2 INJECTION, SOLUTION INTRAMUSCULAR; INTRAVENOUS; SUBCUTANEOUS
Status: DISCONTINUED | OUTPATIENT
Start: 2023-08-16 | End: 2023-08-20 | Stop reason: HOSPADM

## 2023-08-16 RX ORDER — ALBUTEROL SULFATE 90 UG/1
2 AEROSOL, METERED RESPIRATORY (INHALATION) EVERY 6 HOURS PRN
Status: DISCONTINUED | OUTPATIENT
Start: 2023-08-16 | End: 2023-08-20 | Stop reason: HOSPADM

## 2023-08-16 RX ORDER — ACETAMINOPHEN 325 MG/1
650 TABLET ORAL EVERY 6 HOURS PRN
Status: DISCONTINUED | OUTPATIENT
Start: 2023-08-16 | End: 2023-08-16

## 2023-08-16 RX ORDER — HYDROMORPHONE HYDROCHLORIDE 1 MG/ML
1 INJECTION, SOLUTION INTRAMUSCULAR; INTRAVENOUS; SUBCUTANEOUS EVERY 4 HOURS PRN
Status: DISCONTINUED | OUTPATIENT
Start: 2023-08-16 | End: 2023-08-16

## 2023-08-16 RX ORDER — HEPARIN SODIUM (PORCINE) LOCK FLUSH IV SOLN 100 UNIT/ML 100 UNIT/ML
5 SOLUTION INTRAVENOUS
Status: CANCELLED | OUTPATIENT
Start: 2023-08-16

## 2023-08-16 RX ORDER — DEFERASIROX 360 MG/1
1440 TABLET, FILM COATED ORAL EVERY EVENING
Status: DISCONTINUED | OUTPATIENT
Start: 2023-08-16 | End: 2023-08-20 | Stop reason: HOSPADM

## 2023-08-16 RX ORDER — OXYCODONE HYDROCHLORIDE 10 MG/1
20 TABLET ORAL EVERY 4 HOURS
Status: DISCONTINUED | OUTPATIENT
Start: 2023-08-16 | End: 2023-08-16

## 2023-08-16 RX ORDER — HYDROMORPHONE HYDROCHLORIDE 1 MG/ML
0.5 INJECTION, SOLUTION INTRAMUSCULAR; INTRAVENOUS; SUBCUTANEOUS
Status: DISCONTINUED | OUTPATIENT
Start: 2023-08-16 | End: 2023-08-16

## 2023-08-16 RX ORDER — PROCHLORPERAZINE MALEATE 10 MG
10 TABLET ORAL EVERY 6 HOURS PRN
Status: DISCONTINUED | OUTPATIENT
Start: 2023-08-16 | End: 2023-08-16

## 2023-08-16 RX ORDER — DIPHENHYDRAMINE HCL 25 MG
25 CAPSULE ORAL EVERY 6 HOURS PRN
Status: DISCONTINUED | OUTPATIENT
Start: 2023-08-16 | End: 2023-08-20 | Stop reason: HOSPADM

## 2023-08-16 RX ORDER — HEPARIN SODIUM,PORCINE 10 UNIT/ML
5 VIAL (ML) INTRAVENOUS
Status: CANCELLED | OUTPATIENT
Start: 2023-08-16

## 2023-08-16 RX ORDER — ONDANSETRON 4 MG/1
4 TABLET, ORALLY DISINTEGRATING ORAL EVERY 6 HOURS PRN
Status: DISCONTINUED | OUTPATIENT
Start: 2023-08-16 | End: 2023-08-16

## 2023-08-16 RX ORDER — HYDROMORPHONE HYDROCHLORIDE 1 MG/ML
0.5 INJECTION, SOLUTION INTRAMUSCULAR; INTRAVENOUS; SUBCUTANEOUS
Status: DISCONTINUED | OUTPATIENT
Start: 2023-08-16 | End: 2023-08-20 | Stop reason: HOSPADM

## 2023-08-16 RX ORDER — PROCHLORPERAZINE 25 MG
25 SUPPOSITORY, RECTAL RECTAL EVERY 12 HOURS PRN
Status: DISCONTINUED | OUTPATIENT
Start: 2023-08-16 | End: 2023-08-16

## 2023-08-16 RX ADMIN — HYDROMORPHONE HYDROCHLORIDE 1 MG: 1 INJECTION, SOLUTION INTRAMUSCULAR; INTRAVENOUS; SUBCUTANEOUS at 23:37

## 2023-08-16 RX ADMIN — SODIUM CHLORIDE: 9 INJECTION, SOLUTION INTRAVENOUS at 15:32

## 2023-08-16 RX ADMIN — ACETAMINOPHEN 975 MG: 325 TABLET, FILM COATED ORAL at 10:08

## 2023-08-16 RX ADMIN — SODIUM CHLORIDE, POTASSIUM CHLORIDE, SODIUM LACTATE AND CALCIUM CHLORIDE: 600; 310; 30; 20 INJECTION, SOLUTION INTRAVENOUS at 01:01

## 2023-08-16 RX ADMIN — HYDROMORPHONE HYDROCHLORIDE 1 MG: 1 INJECTION, SOLUTION INTRAMUSCULAR; INTRAVENOUS; SUBCUTANEOUS at 15:26

## 2023-08-16 RX ADMIN — HYDROXYUREA 3000 MG: 500 CAPSULE ORAL at 10:06

## 2023-08-16 RX ADMIN — HYDROMORPHONE HYDROCHLORIDE 1 MG: 1 INJECTION, SOLUTION INTRAMUSCULAR; INTRAVENOUS; SUBCUTANEOUS at 19:35

## 2023-08-16 RX ADMIN — DEFERASIROX 1440 MG: 360 TABLET, FILM COATED ORAL at 21:43

## 2023-08-16 RX ADMIN — ACETAMINOPHEN 975 MG: 325 TABLET, FILM COATED ORAL at 15:32

## 2023-08-16 RX ADMIN — OXYCODONE HYDROCHLORIDE 15 MG: 5 TABLET ORAL at 00:51

## 2023-08-16 RX ADMIN — SENNOSIDES AND DOCUSATE SODIUM 2 TABLET: 50; 8.6 TABLET ORAL at 21:43

## 2023-08-16 RX ADMIN — ASPIRIN 81 MG CHEWABLE TABLET 81 MG: 81 TABLET CHEWABLE at 21:43

## 2023-08-16 RX ADMIN — ACETAMINOPHEN 975 MG: 325 TABLET, FILM COATED ORAL at 21:43

## 2023-08-16 RX ADMIN — CETIRIZINE HYDROCHLORIDE 10 MG: 10 TABLET, FILM COATED ORAL at 10:08

## 2023-08-16 RX ADMIN — Medication 4 MG: at 04:31

## 2023-08-16 RX ADMIN — Medication 4 MG: at 00:58

## 2023-08-16 RX ADMIN — ASPIRIN 81 MG CHEWABLE TABLET 81 MG: 81 TABLET CHEWABLE at 10:09

## 2023-08-16 RX ADMIN — KETAMINE HYDROCHLORIDE 4 MG/HR: 10 INJECTION INTRAMUSCULAR; INTRAVENOUS at 07:53

## 2023-08-16 RX ADMIN — SODIUM CHLORIDE: 9 INJECTION, SOLUTION INTRAVENOUS at 23:42

## 2023-08-16 RX ADMIN — KETOROLAC TROMETHAMINE 15 MG: 15 INJECTION, SOLUTION INTRAMUSCULAR; INTRAVENOUS at 00:50

## 2023-08-16 RX ADMIN — SODIUM CHLORIDE: 9 INJECTION, SOLUTION INTRAVENOUS at 10:12

## 2023-08-16 ASSESSMENT — ACTIVITIES OF DAILY LIVING (ADL)
ADLS_ACUITY_SCORE: 35
ADLS_ACUITY_SCORE: 18
ADLS_ACUITY_SCORE: 18
ADLS_ACUITY_SCORE: 35
CONCENTRATING,_REMEMBERING_OR_MAKING_DECISIONS_DIFFICULTY: NO
ADLS_ACUITY_SCORE: 35
CHANGE_IN_FUNCTIONAL_STATUS_SINCE_ONSET_OF_CURRENT_ILLNESS/INJURY: NO
ADLS_ACUITY_SCORE: 35
ADLS_ACUITY_SCORE: 18
TOILETING_ISSUES: NO
DRESSING/BATHING_DIFFICULTY: NO
ADLS_ACUITY_SCORE: 35
DOING_ERRANDS_INDEPENDENTLY_DIFFICULTY: NO
ADLS_ACUITY_SCORE: 18
FALL_HISTORY_WITHIN_LAST_SIX_MONTHS: NO
WEAR_GLASSES_OR_BLIND: NO
ADLS_ACUITY_SCORE: 18
ADLS_ACUITY_SCORE: 18
ADLS_ACUITY_SCORE: 35
DIFFICULTY_EATING/SWALLOWING: NO
WALKING_OR_CLIMBING_STAIRS_DIFFICULTY: NO

## 2023-08-16 NOTE — PROGRESS NOTES
6MS ADMISSION 1634    D: Patient admitted/transferred from  via patient transport for sickle cell crisis.     I: Upon arrival to the unit patient was oriented to room, unit, and call light. Patient s height, weight, and vital signs were obtained. Allergies reviewed and allergy band applied. Provider notified of patient s arrival on the unit. Adult AVS completed. Head to toe assessment completed. Education assessment completed. Care plan initiated.    A: Vital signs stable upon admission. Patient rates pain at 8/10. Two RN skin assessment completed? Yes. Second RN was Zulma Trotter. Significant Skin Findings include bilateral breast incisions. [facility] Nurse Consult Ordered? No. Bed Algorithm can be found in PCS flow sheets (Support Surface Algorithm) and on IP [facility] NURSE RESOURCE TAB, was this used during this assessment? No. Was a pulsate mattress ordered? No.    P: Continue to monitor patient s pain and intervene as needed. Continue with plan of care. Notify provider with any concerns or changes in patient status.

## 2023-08-16 NOTE — PROGRESS NOTES
Pt seen and examined  Discussed with the patient, her mother and Hematology Fellow. Started her on Dilaudid iv, prn and ketamine iv 7.5 mg twice daily as needed. She told me that she does not get adequate pain control with Milka oxycodone.   Her hemoglobin is 6.2. will check labs again in am.   Pt is in agreement with the plan.

## 2023-08-16 NOTE — ED TRIAGE NOTES
Patient ambulatory to triage for a sickle cell pain crisis. Patient states she also has chest pain.

## 2023-08-16 NOTE — CONSULTS
Hematology Consult / Initial Consult Progress Note    Date of Service 08/16/2023  Admit Date 8/15/2023   Initial Consult 08/16/23   Hospital Day: 2     Reason for consult: sickle cell pain crisis  Consult requested by: Dr. Janet MD    History of Present Illness  Jennifer Cervantes is a 23 year old woman with a history of sickle cell disease (HbSS) c/b frequent pain episodes, h/o acute chest syndrome, h/o stroke leading to significant cognitive delays and R UE hemiparesis, iron overload 2/2 chronic transfusions, anxiety, depression, who was admitted 8/15/2023 for sickle cell pain episode.     The pt has been f/up by Dr. Duncan as OP. She takes on hydrea 3000mg daily and on montly sidney infusions (last sidney given on 8/11). She is also on deferasirox 1440mg every evening. Her chelation has been on hold due to vision changes. She is on oxycodone 15mg prn, and she has been trying to decrease her opioid requirement.     This time, the pt states that she started to develop whole body pain over the past several days. She states the pain are similar to what she has had in the past (in her chest, ribs, LEs, UEs), but more severe. She especially states she has severe pain in her B/L shoulders. The patient does not report any SOB, abd pain/n/v/d, dysuria, joint pain, rash, or fever. She cannot think of any trigger that started her pain. She has a baseline R UE weakness from her h/o stroke which has been unchanged.     After she came to the ED, she was started on ketamine gtt, currently at 6mg/hr. She is also on APAP TID, ketorolac 30mg q6hr prn, lidocaine cream. On MIVF 125ml/hr. She states she has been able to take anything po after she came here.     Pain plan per Dr. Duncan's team note:   Plan last reviewed with patient: 7/11/22     Patient background: 23 yo F, enjoys movies and kids though there are times where she does not really want to talk to people. Does not have a lot of social support at home.      Sickle Cell Disease  History  Primary Hematologist Team: Jose Rafael Duncan  PCP: none  Genotype: SS  Acute Pain Crisis Treatment: (avoiding IV opioids for now, which she has agreed to)  ER   Oxycodone 15-20 mg x1  Ketamine 4mg IV x 2--helps with opioid sparing  Toradol 15 mg IV x1   Maintenance IV fluids with LR  Other: Zofran 8 mg IV PRN nausea  Inpatient:  Home oxycodone/Oxycontin regimen, though home oxycodone dosing could be increased to 20 mg to start  PCA plan:   None for now  Other Medications: Zofran  She has had success with ketamine starting at 4mg/h and advancing only to 6mg/h, as 8mg/h made her feel quite poorly..  ASA  Supportive Care: Docusate, Senna  Chronic Pain Medications:                          Home regimen Oxycontin 10 mg q12h, oxycodone 15 mg p.o. q.4-6 hours p.r.n. breakthrough pain.  She also continues on Voltaren gel, and Zoloft among other medications.                          -Also benefits from mental health visits, acupuncture  Baseline Hemoglobin: 7 g/dl without chronic transfusions  Hydroxyurea use: Yes  H/O blood transfusions: Yes, several (iron overload) Most recent 11/20/2021  H/O Transfusion Reactions: no  Antibodies:none  H/O Acute Chest Syndrome: Yes  Last episode:9/05/22 (previously 4/26/21, 10/2019)          ICU/intubation: not with 9/2022 admission  H/O Stroke: Yes (managed with chronic transfusions in the past, stopped late Spring 2020)  H/O VTE: Yes (2/2021)  H/O Cholecystectomy or Splenectomy: no  H/O Asthma, Pulm HTN, AVN, Leg Ulcers, Nephropathy, Retinopathy, etc: Iron overload, asthma, chronic lung disease, physical limitations from early stroke      PMH: sickle cell disease (HbSS) c/b frequent pain episodes, h/o acute chest syndrome, h/o stroke  PSH: cholecystectomy  FamHx: sickle cell trait in mother and father  SocHx: non smoker    Meds reviewed in Epic.  Allergies reviewed in Epic  Comprehensive review of systems performed and otherwise negative unless mentioned above.    Physical  Exam  /74   Pulse (!) 123   Temp 99.4  F (37.4  C) (Oral)   Resp 29   SpO2 95%      Constitutional: Awake, alert, in mild distress due to pain  Eyes: PERRL  ENT: Normocephalic  Respiratory: Non-labored breathing, good air exchange, clear to auscultation bilaterally, no crackles or wheezing.  Cardiovascular: RRR, no murmur noted.  GI: + bowel sounds, soft, non-distended, mildly tender  Skin: No concerning lesions or rash on exposed areas.  Musculoskeletal: No edema lea LEs.  Neurologic: Awake, alert & oriented   Psych: appropriate affect        Recent Labs   Lab 08/16/23  0743   WBC 17.9*   HGB 6.2*      MCV 86   RDW 28.5*     Recent Labs   Lab 08/16/23  0743   *   POTASSIUM 3.8   CHLORIDE 102   CO2 22   BUN 6.6   CR 0.54   MICAH 8.6     Recent Labs   Lab 08/16/23  0743 08/16/23  0100   AST 42 46*   ALT 22 20   ALKPHOS 65 73   ALBUMIN 4.2 4.5   PROTTOTAL 7.0 7.8   BILITOTAL 5.7* 5.2*        Recent Labs   Lab 08/16/23  0100 08/15/23  0512 08/11/23  0939   RETICABSCT 0.537*   < > 0.466*   RETP 23.0*   < > 18.7*   SALMA  --   --  4,982*    < > = values in this interval not displayed.     U/A: WBC 1    CXR 8/16/2023   IMPRESSION: Implanted left chest port, catheter via IJ access, tip at the cavoatrial junction, satisfactory positioning, unchanged. Minor strands of linear atelectasis left base. Both lungs are otherwise clear. Resolved small left pleural effusion seen   previously. No adenopathy or pleural effusion on either side. Normal cardiac size and pulmonary vascularity. Anatomic alignment of the bones and joints.    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 The pt was admitted due to uncomplicated sickle cell pain episode. No fever, U/A neg and CXR did not show any opacity suggesting acute chest syndrome.  She is not having any SOB or fever.     2. She has a baseline Hb of 6-7s, currently Hb of 6.2. Acute on chronic H/H drop in the setting of sickle cell pain episode is not uncommon. Would cont to monitor, we may consider RBC transfusion if this further drops but we would like to avoid transfusion as much as possible and also because of her h/o iron overload.     3. The pt is on oxycodone 15mg prn as OP, and she has been trying to gradually taper this down during her recent clinic appts. After admission, the pt c/o having significant pain even after ketamine gtt. We talked to Dr. Duncan, her primary hematologist, who agrees with starting on iv opioid (and she has been started on iv dilaudid prn per primary team as well). For pain mngt, we would recommend cont multimodal pain mngt including ketamine gtt, iv dilaudid prn, oxycodone 20mg prn, scheduled APAP, and ketorolac prn.     Recommendations:  -check CBC/diff, reticulocyte daily   -cont her OP hydroxyurea 3000mg daily, deferasirox 1440mg daily  -Cont multimodal pain control: ketamine gtt (per pain plan), iv dilaudid prn, oxycodone 20mg prn, scheduled APAP, ketorolac prn, topicals, etc.    -Please discuss with hematology before giving any transfusion. We do not have any specific threshold for transfusion and we would like to avoid transfusion if possible. Ok to monitor today's Hb of 6.2.   -please have T&S sent in case she needs urgent exchange transfusion.   -if the pt develops any new fever, SOB, or desaturation, please obtain urgent CXR to assess for possible acute chest syndrome and call the on-call fellow (day hematology fellow during the day, the on-call hem/onc fellow after hours).   -encourage ambulation.  -encourage incentive spirometry.  -encourage po fluid intake. When the pt starts to take po fluid intake, please start to titrate down her MIVF. Can discontinue it when she can take adequate po intake.   -we will keep posted Dr. Duncan, her primary  hematologist.    We will continue to follow.    The above plan was discussed with Dr. Sales, who agrees with the assessment and plan.     Thank you for allowing me to participate in the care of this patient. Please do not hesitate to contact me if there are any concerns or questions.     Eulalio Rees MD  Hem/onc fellow  Division of Hematology, Oncology, and Transplantation  [facility]  Pager: [phone]  Attending  The patient was seen and examined by me separate from the resident/fellow provider.The note above reflects my assessment and plan. I have personally reviewed today's labs,vital and radiology results. The points of care that were added by me are:    Jennifer is experiencing a sickle cell crisis. She is not getting releif from oxycodone and ketamine Agree to try low doses of IV dilaudid. Ultimately would benefit from suboxone  Isael Sales M.D.  [phone]

## 2023-08-16 NOTE — TELEPHONE ENCOUNTER
Jennifer calls in she is currently in the hospital and states no one is listening to her about her pain. She is trying to advocate for herself but says no one is listening to her that she needs dilaudid.   [facility] hospital staff states they have talked to Dr Duncan but she does not know if that is true.     She would like a call back from a member of her care team.

## 2023-08-16 NOTE — PLAN OF CARE
Patient was received from [place] @1300  Patient came up with ketamine running @ 6 mg/hr with 0.9% NACL 125 mls/hr infusing through R.chest port  Admission protocols observed  Patient continued to have pain 10/10  Skin assessment could not be completed as patient was in so much pain.  Hgb 6.2 to be rechecked tomorrow AM  Medicine and hematology consult  Ketamine was stopped and prn IV Dilaudid given   Order for telemetry  placed and patient was transferred to [address] [place] [address] @ 1530 for further cardiac monitoring  Report given to  MS Nurse

## 2023-08-16 NOTE — TELEPHONE ENCOUNTER
Spoke with pt.  Dr Sales was able to order some IV dilaudid and she states her pain is already much better.  She is able to sleep and eat a bit now that it is starting to calm down.  She states she was frustrated because no one was listening to her or her taking her pain seriously.  She states she thinks once she gets a few more doses in of the IV pain meds, she should start to feel much better.  Writer advised that she reach back out if she would like to let us know of any ongoing concerns.

## 2023-08-16 NOTE — MEDICATION SCRIBE - ADMISSION MEDICATION HISTORY
Medication Scribe Admission Medication History    Admission medication history is complete. The information provided in this note is only as accurate as the sources available at the time of the update.    Medication reconciliation/reorder completed by provider prior to medication history? Yes    Information Source(s): Patient via in-person    Pertinent Information: None    Changes made to PTA medication list:  Added: None  Deleted: None  Changed: None    Medication Affordability:  Not including over the counter (OTC) medications, was there a time in the past 3 months when you did not take your medications as prescribed because of cost?: No    Allergies reviewed with patient and updates made in EHR: yes    Medication History Completed By: Aleida Muhammad 8/16/2023 9:36 AM    Prior to Admission medications    Medication Sig Last Dose Taking? Auth Provider Long Term End Date   acetaminophen (TYLENOL) 325 MG tablet Take 2 tablets (650 mg) by mouth every 6 hours as needed for mild pain 8/15/2023 at pm Yes Eric Duncan MD     albuterol (PROAIR HFA/PROVENTIL HFA/VENTOLIN HFA) 108 (90 Base) MCG/ACT inhaler Inhale 2 puffs into the lungs every 6 hours as needed for shortness of breath or wheezing 8/15/2023 at pm Yes Patricia Mantilla CNP Yes    albuterol (PROVENTIL) (2.5 MG/3ML) 0.083% neb solution Take 2 vials (5 mg) by nebulization every 6 hours as needed for shortness of breath or wheezing More than a month at unknown Yes Patricia Mantilla CNP Yes    aspirin (ASA) 81 MG chewable tablet Take 1 tablet (81 mg) by mouth 2 times daily 8/15/2023 at pm Yes Patricia Mantilla CNP No    budesonide-formoterol (SYMBICORT) 160-4.5 MCG/ACT Inhaler Inhale 2 puffs into the lungs 2 times daily 8/15/2023 at pm Yes Patricia Mantilla CNP Yes    cetirizine (ZYRTEC) 10 MG tablet Take 1 tablet (10 mg) by mouth daily More than a month at unknown Yes Patricia Mantilla CNP     deferasirox (JADENU) 360 MG tablet Take 4 tablets (1,440 mg) by mouth  every evening 8/15/2023 at pm Yes Patricia Mantilla CNP Yes    diphenhydrAMINE (BENADRYL) 25 MG capsule Take 1 capsule (25 mg) by mouth every 6 hours as needed for itching or allergies More than a month at unknown Yes Patricia Mantilla CNP     Hydroxyurea 1000 MG TABS Take 3,000 mg by mouth daily 8/15/2023 at pm Yes Patricia Mantilla CNP Yes    ondansetron (ZOFRAN) 8 MG tablet Take 1 tablet (8 mg) by mouth every 8 hours as needed More than a month at unknown Yes Patricia Mantilla CNP     oxyCODONE IR (ROXICODONE) 15 MG tablet Take 1 tablet (15 mg) by mouth every 4 hours as needed for severe pain Goal 4 per day. Max 6 per day. 8/15/2023 at unknown Yes Patricia Mantilla CNP No    EPINEPHrine (ANY BX GENERIC EQUIV) 0.3 MG/0.3ML injection 2-pack Inject 0.3 mLs (0.3 mg) into the muscle as needed for anaphylaxis  Patient not taking: Reported on 3/10/2023  at has on hand  Andrei Machado PA     naloxone (NARCAN) 4 MG/0.1ML nasal spray Spray 4 mg into one nostril alternating nostrils as needed for opioid reversal every 2-3 minutes until assistance arrives  Patient not taking: Reported on 5/18/2023  at has on hand  Reported, Patient No

## 2023-08-16 NOTE — ED PROVIDER NOTES
[facility] ([facility])    8/15/23       ED PROVIDER NOTE      History     Chief Complaint   Patient presents with    Sickle Cell Pain Crisis     The history is provided by the patient and medical records.     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  Patient states that she presented here last evening for the same concern and was able to discharge in stable condition this morning.  However, her pain returned this afternoon.  She has been experiencing this particular pain crisis for the past 4 days.  States that it is consistent with her typical sickle cell pain.  Patient notes that the pain is affecting her entire body, but is most prominent in her chest and ribs.  She reports taking oxycodone and Tylenol at home, last dose was 4 hours prior to arrival.  Patient typically takes 15 mg of oxycodone every 6 hours, every 4 hours during a pain crisis.  She denies fever, chills,  cough, or recent illness.  Denies shortness of breath or abdominal pain.  No other concerns at this time.    Past Medical History  Past Medical History:   Diagnosis Date    Anxiety     Bleeding disorder (H)     Blood clotting disorder (H)     Cerebral infarction (H) 2015    Cognitive developmental delay     low IQ. 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 side (H)     right hand contractures    Iron overload due to repeated red blood cell transfusions     Migraines     Multiple subsegmental pulmonary emboli without acute cor pulmonale (H) 02/01/2021    Oppositional defiant behavior     Presence of intrauterine contraceptive device  2/18/2020    Superficial venous thrombosis of arm, right 03/25/2021    Uncomplicated asthma      Past Surgical History:   Procedure Laterality Date    AS INSERT TUNNELED CV 2 CATH W/O PORT/PUMP      CHOLECYSTECTOMY      CV RIGHT HEART CATH MEASUREMENTS RECORDED N/A 11/18/2021    Procedure: Right Heart Cath;  Surgeon: Jackson Stauffer MD;  Location:  HEART CARDIAC CATH LAB    INSERT PORT VASCULAR ACCESS Left 4/21/2021    Procedure: INSERTION, VASCULAR ACCESS PORT (NOT SURE ON SIDE UNTIL REMOVAL);  Surgeon: Rajan More MD;  Location: [facility] OR    IR CHEST PORT PLACEMENT > 5 YRS OF AGE  4/21/2021    IR CVC NON TUNNEL LINE REMOVAL  5/6/2021    IR CVC NON TUNNEL PLACEMENT > 5 YRS  04/07/2020    IR CVC NON TUNNEL PLACEMENT > 5 YRS  4/30/2021    IR CVC NON TUNNEL PLACEMENT > 5 YRS  9/7/2022    IR PORT REMOVAL LEFT  4/21/2021    REMOVE PORT VASCULAR ACCESS Left 4/21/2021    Procedure: REMOVAL, VASCULAR ACCESS PORT LEFT;  Surgeon: Rajan More MD;  Location: [facility] OR    REPAIR TENDON ELBOW Right 10/02/2019    Procedure: Right Elbow Flexor Lengthening, Flexor Pronator Slide Of Wrist and Finger, Thumb Adductor Lengthening;  Surgeon: Anai Franco MD;  Location: [facility] OR    TONSILLECTOMY Bilateral 10/02/2019    Procedure: Bilateral Tonsillectomy;  Surgeon: Farhana Guy MD;  Location: [facility] OR    ZZC BREAST REDUCTION (INCLUDES LIPO) TIER 3 Bilateral 04/23/2019     acetaminophen (TYLENOL) 325 MG tablet  albuterol (PROAIR HFA/PROVENTIL HFA/VENTOLIN HFA) 108 (90 Base) MCG/ACT inhaler  albuterol (PROVENTIL) (2.5 MG/3ML) 0.083% neb solution  aspirin (ASA) 81 MG chewable tablet  budesonide-formoterol (SYMBICORT) 160-4.5 MCG/ACT Inhaler  cetirizine (ZYRTEC) 10 MG tablet  deferasirox (JADENU) 360 MG tablet  diphenhydrAMINE (BENADRYL) 25 MG capsule  EPINEPHrine (ANY BX GENERIC EQUIV) 0.3 MG/0.3ML injection 2-pack  FLUoxetine (PROZAC) 10 MG capsule  Hydroxyurea 1000 MG TABS  naloxone (NARCAN) 4 MG/0.1ML nasal  spray  ondansetron (ZOFRAN) 8 MG tablet  oxyCODONE IR (ROXICODONE) 15 MG tablet  traZODone (DESYREL) 50 MG tablet      Allergies   Allergen Reactions    Contrast Dye      Hives and breathing issues    Fish-Derived Products Hives    Seafood Hives    Gadolinium     Iodinated Contrast Media      Family History  Family History   Problem Relation Age of Onset    Sickle Cell Trait Mother     Hypertension Mother     Asthma Mother     Sickle Cell Trait Father     Glaucoma No family hx of     Macular Degeneration No family hx of     Diabetes No family hx of     Gout No family hx of      Social History   Social History     Tobacco Use    Smoking status: Never     Passive exposure: Never    Smokeless tobacco: Never   Substance Use Topics    Alcohol use: Not Currently     Alcohol/week: 0.0 standard drinks of alcohol    Drug use: Never      Past medical history, past surgical history, medications, allergies, family history, and social history were reviewed with the patient. No additional pertinent items.      A medically appropriate review of systems was performed with pertinent positives and negatives noted in the HPI, and all other systems negative.    Physical Exam   BP: 134/89  Pulse: (!) 121  Temp: 98.6  F (37  C)  Resp: 16  SpO2: 95 %  Physical Exam  General: Afebrile, moderate distress 2/2 to pain, tearful  HEENT: Normocephalic, atraumatic, conjunctivae normal. MMM  Neck: non-tender, supple  Cardio: regular rate. regular rhythm   Resp: Normal work of breathing, no respiratory distress, lungs clear bilaterally, no wheezing, rhonchi, rales  Chest/Back: no visual signs of trauma, no CVA tenderness   Abdomen: soft, non distension, no tenderness, no peritoneal signs   Neuro: alert and fully oriented. CN II-XII grossly intact. Grossly normal strength and sensation in all extremities.   MSK: no deformities. Normal range of motion  Integumentary/Skin: no rash visualized, normal color  Psych: tearful, normal affect, normal  behavior    ED Course, Procedures, & Data    12:36 AM  The patient was seen and examined by Jamee Martin   in Room ED15.     Procedures    ED Course Selections:        EKG Interpretation:      Interpreted by Jamee Martin MD  Time reviewed: 0044  Symptoms at time of EKG: chest pain   Rhythm: sinus   Rate: tachycardia - 104  Axis: normal  Ectopy: none  Conduction: normal  ST Segments/ T Waves: No acute ischemic change  Q Waves: none  Comparison to prior: no significant change when compared to prior    Clinical Impression: sinus tachycardia with no acute ischemic change     Results for orders placed or performed during the hospital encounter of 08/15/23   XR Chest 2 Views     Status: None    Narrative    EXAM: XR CHEST 2 VIEWS  LOCATION: [facility]  DATE: 8/16/2023    INDICATION: Chest pain. History of sickle cell disease.  COMPARISON: Chest x-ray 2 views 08/02/2023 at 1711 hours.      Impression    IMPRESSION: Implanted left chest port, catheter via IJ access, tip at the cavoatrial junction, satisfactory positioning, unchanged. Minor strands of linear atelectasis left base. Both lungs are otherwise clear. Resolved small left pleural effusion seen   previously. No adenopathy or pleural effusion on either side. Normal cardiac size and pulmonary vascularity. Anatomic alignment of the bones and joints.   Comprehensive metabolic panel     Status: Abnormal   Result Value Ref Range    Sodium 134 (L) 136 - 145 mmol/L    Potassium 3.8 3.4 - 5.3 mmol/L    Chloride 102 98 - 107 mmol/L    Carbon Dioxide (CO2) 21 (L) 22 - 29 mmol/L    Anion Gap 11 7 - 15 mmol/L    Urea Nitrogen 5.2 (L) 6.0 - 20.0 mg/dL    Creatinine 0.55 0.51 - 0.95 mg/dL    Calcium 8.8 8.6 - 10.0 mg/dL    Glucose 90 70 - 99 mg/dL    Alkaline Phosphatase 73 35 - 104 U/L    AST 46 (H) 0 - 45 U/L    ALT 20 0 - 50 U/L    Protein Total 7.8 6.4 - 8.3 g/dL    Albumin 4.5 3.5 - 5.2 g/dL    Bilirubin Total 5.2 (H)  <=1.2 mg/dL    GFR Estimate >90 >60 mL/min/1.73m2   Reticulocyte count     Status: Abnormal   Result Value Ref Range    % Reticulocyte 23.0 (H) 0.5 - 2.0 %    Absolute Reticulocyte 0.537 (H) 0.025 - 0.095 10e6/uL   Troponin T, High Sensitivity     Status: Normal   Result Value Ref Range    Troponin T, High Sensitivity <6 <=14 ng/L   CBC with platelets and differential     Status: Abnormal   Result Value Ref Range    WBC Count 16.8 (H) 4.0 - 11.0 10e3/uL    RBC Count 2.33 (L) 3.80 - 5.20 10e6/uL    Hemoglobin 7.0 (L) 11.7 - 15.7 g/dL    Hematocrit 20.2 (L) 35.0 - 47.0 %    MCV 87 78 - 100 fL    MCH 30.0 26.5 - 33.0 pg    MCHC 34.7 31.5 - 36.5 g/dL    RDW 29.2 (H) 10.0 - 15.0 %    Platelet Count 467 (H) 150 - 450 10e3/uL    % Neutrophils 82 %    % Lymphocytes 7 %    % Monocytes 9 %    % Eosinophils 0 %    % Basophils 1 %    % Immature Granulocytes 1 %    NRBCs per 100 WBC 3 (H) <1 /100    Absolute Neutrophils 13.9 (H) 1.6 - 8.3 10e3/uL    Absolute Lymphocytes 1.2 0.8 - 5.3 10e3/uL    Absolute Monocytes 1.5 (H) 0.0 - 1.3 10e3/uL    Absolute Eosinophils 0.0 0.0 - 0.7 10e3/uL    Absolute Basophils 0.1 0.0 - 0.2 10e3/uL    Absolute Immature Granulocytes 0.1 <=0.4 10e3/uL    Absolute NRBCs 0.6 10e3/uL   EKG 12-lead, tracing only     Status: None (Preliminary result)   Result Value Ref Range    Systolic Blood Pressure  mmHg    Diastolic Blood Pressure  mmHg    Ventricular Rate 104 BPM    Atrial Rate 104 BPM    FL Interval 140 ms    QRS Duration 72 ms     ms    QTc 460 ms    P Axis 69 degrees    R AXIS 44 degrees    T Axis 28 degrees    Interpretation ECG Sinus tachycardia  Otherwise normal ECG      CBC with platelets differential     Status: Abnormal    Narrative    The following orders were created for panel order CBC with platelets differential.  Procedure                               Abnormality         Status                     ---------                               -----------         ------                      CBC with platelets and d...[[id number]]  Abnormal            Final result                 Please view results for these tests on the individual orders.     Medications   lactated ringers infusion ( Intravenous $New Bag 8/16/23 0101)   oxyCODONE (ROXICODONE) tablet 15 mg (15 mg Oral $Given 8/16/23 0051)   ketorolac (TORADOL) injection 15 mg (15 mg Intravenous $Given 8/16/23 0050)   ketamine (KETALAR) injection 4 mg (4 mg Intravenous $Given 8/16/23 0431)     Labs Ordered and Resulted from Time of ED Arrival to Time of ED Departure   COMPREHENSIVE METABOLIC PANEL - Abnormal       Result Value    Sodium 134 (*)     Potassium 3.8      Chloride 102      Carbon Dioxide (CO2) 21 (*)     Anion Gap 11      Urea Nitrogen 5.2 (*)     Creatinine 0.55      Calcium 8.8      Glucose 90      Alkaline Phosphatase 73      AST 46 (*)     ALT 20      Protein Total 7.8      Albumin 4.5      Bilirubin Total 5.2 (*)     GFR Estimate >90     RETICULOCYTE COUNT - Abnormal    % Reticulocyte 23.0 (*)     Absolute Reticulocyte 0.537 (*)    CBC WITH PLATELETS AND DIFFERENTIAL - Abnormal    WBC Count 16.8 (*)     RBC Count 2.33 (*)     Hemoglobin 7.0 (*)     Hematocrit 20.2 (*)     MCV 87      MCH 30.0      MCHC 34.7      RDW 29.2 (*)     Platelet Count 467 (*)     % Neutrophils 82      % Lymphocytes 7      % Monocytes 9      % Eosinophils 0      % Basophils 1      % Immature Granulocytes 1      NRBCs per 100 WBC 3 (*)     Absolute Neutrophils 13.9 (*)     Absolute Lymphocytes 1.2      Absolute Monocytes 1.5 (*)     Absolute Eosinophils 0.0      Absolute Basophils 0.1      Absolute Immature Granulocytes 0.1      Absolute NRBCs 0.6     TROPONIN T, HIGH SENSITIVITY - Normal    Troponin T, High Sensitivity <6       XR Chest 2 Views   Final Result   IMPRESSION: Implanted left chest port, catheter via IJ access, tip at the cavoatrial junction, satisfactory positioning, unchanged. Minor strands of linear atelectasis left base. Both lungs are otherwise  clear. Resolved small left pleural effusion seen    previously. No adenopathy or pleural effusion on either side. Normal cardiac size and pulmonary vascularity. Anatomic alignment of the bones and joints.             Critical care was not performed.     Medical Decision Making  The patient's presentation was of high complexity (a chronic illness severe exacerbation, progression, or side effect of treatment).    The patient's evaluation involved:  review of external note(s) from 3+ sources (prior ED note and hospital admissions)  ordering and/or review of 3+ test(s) in this encounter (see separate area of note for details)  review of 3+ test result(s) ordered prior to this encounter (see separate area of note for details)  independent interpretation of testing performed by another health professional (chest x-ray)    The patient's management necessitated moderate risk (prescription drug management including medications given in the ED), high risk (a parenteral controlled substance), and high risk (a decision regarding hospitalization).    Assessment & Plan    Jennifer Cervantes is a 23 year old female with a past medical history significant for sickle cell disorder, right-sided hemiplegia and hemiparesis 2/2 cerebral infarction recurrent PE, cognitive developmental delay, and acute chest syndrome who presents to the ED for evaluation of sickle cell pain crisis.  Upon arrival patient is ill but nontoxic-appearing, afebrile, moderate distress and tearful secondary to pain.  Patient initially tachycardic upon arrival, otherwise hemodynamically stable.  Patient denies any shortness of breath, no respiratory distress, no hypoxia.  Upon arrival patient points with axis, patient was treated with her previously established care plan which included an oxycodone, ketorolac, ketamine, IV fluids.  EKG and chest x-ray were performed due to patient's report of chest pain.  I reviewed EKG which demonstrates sinus tachycardia with a  ventricular rate of 104 bpm, normal axis, no acute ischemic change, no significant change when compared to prior EKG.     I reviewed comprehensive labs which are remarkable for mild leukocytosis of 16.8 decreased from yesterday 18.3, hemoglobin stable at 7, sickle cell count 23, troponin less than 6, no acute metabolic or electrolyte abnormality.  Overall laboratory testing appears to be at baseline and stable when compared to prior.  I personally reviewed and interpreted chest x-ray which demonstrates no focal consolidation, evidence of infection, pneumothorax.  Similar when compared to most recent CXR on 8/2/2023.  On reevaluation patient still pain despite her pain medications per care plan, patient does not feel as if she can go home and manage her pain with her home medications.  I discussed patient's management with internal medicine hospitalist and will plan on admission for ongoing pain management and further evaluation..  Patient understands and agrees with the plan.    I have reviewed the nursing notes. I have reviewed the findings, diagnosis, plan and need for follow up with the patient.    New Prescriptions    No medications on file       Final diagnoses:   Sickle cell pain crisis (H)   IWillow, am serving as a trained medical scribe to document services personally performed by Jamee Martin MD, based on the provider's statements to me.     IJmaee MD, was physically present and have reviewed and verified the accuracy of this note documented by Willow Martinez.      Jamee Martin MD  [facility] EMERGENCY DEPARTMENT  8/15/2023     Jamee Martin MD  08/16/23 [id number]

## 2023-08-16 NOTE — H&P
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     Acute Issues:  Acute pain crisis  HbSS disease c/b acute chest syndrome, recurrent vaso-occlusive crises  Hx CVA managed with transfusions c/b iron overload  Patient presents with pain crisis, affecting her whole body, that started on 8/12. Baseline Hgb 7 w/o chronic transfusions. CXR w/o infiltrate. Afebrile, though WBC 16.8, down from 18.3 the day prior. She denies fever, chills, cough, or SOB. Troponin < 0.6. Low suspicion for PE; Well's score 1.5.  - Pt may be transferred to [facility]. Consider Hematology consult when transferred  - Per care plan:              - Ketamine gtt @ 4mg/h (advance to 6mg/h, but do not advance to 8mg/h as patient has felt poorly with this dose in past)              - Oxycodone 20mg Q4H prn              - Toradol 30mg Q6H prn              - APAP 975mg TID              - Avoid IV opioids if possible  - Bowel regimen: Miralax daily, Senokot BID  - Hematology consulted; recs appreciated  - Continue PTA hydroxyurea, deferasirox, ASA  - Lovenox for DVT ppx  - Workup: UA, Bcx, procal  - Maintain SpO2 > 92%  - LR 150cc/hr     Chronic/Stable Issues:  Moderate persistent asthma, not in acute exacerbation  - Continue PTA Symbicort inhaler (equivalent), albuterol inhaler and nebulizer PRN     Depression  - Continue PTA fluoxetine     Diet: Regular Diet Adult  DVT Prophylaxis: Enoxaparin (Lovenox) SQ  Lopez Catheter: Not present  Fluids: LR  Lines: PRESENT         Cardiac Monitoring: None  Code Status: Full CodeFull (unable to review with pt due to extreme pain, so based off  "prior)    Clinically Significant Risk Factors Present on Admission                  # Drug Induced Platelet Defect: home medication list includes an antiplatelet medication            # Asthma: noted on problem list        Disposition Plan      Expected Discharge Date: 08/18/2023                Patient to be staffed in AM.      WAQAR VIRK MD  Hospitalist Service, [facility]  Securely message with [employer] (more info)  Text page via [employer] Paging/Directory   See signed in provider for up to date coverage information  ______________________________________________________________________    Chief Complaint   Sickle cell pain crisis    History is obtained from the patient and chart review    History of Present Illness   Jennifer Cervantes is a 23 year old female who presents with sickle cell pain crisis.  Unable to obtain much history from patient as she was crying in pain.  She said that in the past Dilaudid has worked to treat her pain.  She describes intense pain all over her body.    Per ED note,  \"Patient states that she presented here last evening for the same concern and was able to discharge in stable condition this morning. However, her pain returned this afternoon. She has been experiencing this particular pain crisis for the past 4 days. States that it is consistent with her typical sickle cell pain. Patient notes that the pain is affecting her entire body, but is most prominent in her chest and ribs. She reports taking oxycodone and Tylenol at home, last dose was 4 hours prior to arrival. Patient typically takes 15 mg of oxycodone every 6 hours, every 4 hours during a pain crisis. She denies fever, chills, cough, or recent illness. Denies shortness of breath or abdominal pain. No other concerns at this time.\"    ED course:  Patient was treated with her previously established care plan which included an oxycodone, ketorolac, ketamine, IV fluids. EKG and " chest x-ray were performed due to patient's report of chest pain. EKG demonstrates sinus tachycardia with a ventricular rate of 104 bpm, normal axis, no acute ischemic change, no significant change when compared to prior EKG. CXR w/o signs of infection or focal consolidation.      Past Medical History    Past Medical History:   Diagnosis Date    Anxiety     Bleeding disorder (H)     Blood clotting disorder (H)     Cerebral infarction (H) 2015    Cognitive developmental delay     low IQ. 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 side (H)     right hand contractures    Iron overload due to repeated red blood cell transfusions     Migraines     Multiple subsegmental pulmonary emboli without acute cor pulmonale (H) 02/01/2021    Oppositional defiant behavior     Presence of intrauterine contraceptive device 2/18/2020    Superficial venous thrombosis of arm, right 03/25/2021    Uncomplicated asthma        Past Surgical History   Past Surgical History:   Procedure Laterality Date    AS INSERT TUNNELED CV 2 CATH W/O PORT/PUMP      CHOLECYSTECTOMY      CV RIGHT HEART CATH MEASUREMENTS RECORDED N/A 11/18/2021    Procedure: Right Heart Cath;  Surgeon: Jackson Stauffer MD;  Location:  HEART CARDIAC CATH LAB    INSERT PORT VASCULAR ACCESS Left 4/21/2021    Procedure: INSERTION, VASCULAR ACCESS PORT (NOT SURE ON SIDE UNTIL REMOVAL);  Surgeon: Rajan More MD;  Location: [facility] OR    IR CHEST PORT PLACEMENT > 5 YRS OF AGE  4/21/2021    IR CVC NON TUNNEL LINE REMOVAL  5/6/2021    IR CVC NON TUNNEL PLACEMENT > 5 YRS  04/07/2020    IR CVC NON TUNNEL PLACEMENT > 5 YRS  4/30/2021    IR CVC NON TUNNEL PLACEMENT > 5 YRS  9/7/2022    IR PORT REMOVAL LEFT  4/21/2021    REMOVE PORT VASCULAR ACCESS Left 4/21/2021    Procedure: REMOVAL, VASCULAR ACCESS PORT LEFT;  Surgeon: Rajan More MD;  Location: [facility]     REPAIR TENDON ELBOW Right 10/02/2019    Procedure: Right Elbow Flexor Lengthening, Flexor Pronator Slide Of Wrist and Finger, Thumb Adductor Lengthening;  Surgeon: Anai Franco MD;  Location: [facility] OR    TONSILLECTOMY Bilateral 10/02/2019    Procedure: Bilateral Tonsillectomy;  Surgeon: Farhana Guy MD;  Location: [facility] OR    ZZC BREAST REDUCTION (INCLUDES LIPO) TIER 3 Bilateral 04/23/2019       Prior to Admission Medications   Prior to Admission Medications   Prescriptions Last Dose Informant Patient Reported? Taking?   EPINEPHrine (ANY BX GENERIC EQUIV) 0.3 MG/0.3ML injection 2-pack  Self No No   Sig: Inject 0.3 mLs (0.3 mg) into the muscle as needed for anaphylaxis   Patient not taking: Reported on 3/10/2023   FLUoxetine (PROZAC) 10 MG capsule  Self No No   Sig: Take 1 capsule (10 mg) by mouth daily For two weeks, then increase to 20 mg if 10 mg not effective   Hydroxyurea 1000 MG TABS   No No   Sig: Take 3,000 mg by mouth daily   acetaminophen (TYLENOL) 325 MG tablet  Self No No   Sig: Take 2 tablets (650 mg) by mouth every 6 hours as needed for mild pain   albuterol (PROAIR HFA/PROVENTIL HFA/VENTOLIN HFA) 108 (90 Base) MCG/ACT inhaler   No No   Sig: Inhale 2 puffs into the lungs every 6 hours as needed for shortness of breath or wheezing   albuterol (PROVENTIL) (2.5 MG/3ML) 0.083% neb solution   No No   Sig: Take 2 vials (5 mg) by nebulization every 6 hours as needed for shortness of breath or wheezing   aspirin (ASA) 81 MG chewable tablet   No No   Sig: Take 1 tablet (81 mg) by mouth 2 times daily   budesonide-formoterol (SYMBICORT) 160-4.5 MCG/ACT Inhaler   No No   Sig: Inhale 2 puffs into the lungs 2 times daily   cetirizine (ZYRTEC) 10 MG tablet   No No   Sig: Take 1 tablet (10 mg) by mouth daily   deferasirox (JADENU) 360 MG tablet   No No   Sig: Take 4 tablets (1,440 mg) by mouth every evening   diphenhydrAMINE (BENADRYL) 25 MG capsule   No No   Sig: Take 1 capsule (25 mg) by mouth  every 6 hours as needed for itching or allergies   naloxone (NARCAN) 4 MG/0.1ML nasal spray   Yes No   Sig: Spray 4 mg into one nostril alternating nostrils as needed for opioid reversal every 2-3 minutes until assistance arrives   Patient not taking: Reported on 5/18/2023   ondansetron (ZOFRAN) 8 MG tablet  Self No No   Sig: Take 1 tablet (8 mg) by mouth every 8 hours as needed   Patient not taking: Reported on 6/5/2023   oxyCODONE IR (ROXICODONE) 15 MG tablet   No No   Sig: Take 1 tablet (15 mg) by mouth every 4 hours as needed for severe pain Goal 4 per day. Max 6 per day.   traZODone (DESYREL) 50 MG tablet  Self No No   Sig: Take 1 tablet (50 mg) by mouth At Bedtime      Facility-Administered Medications: None        Social History   I have reviewed this patient's social history and updated it with pertinent information if needed.  Social History     Tobacco Use    Smoking status: Never     Passive exposure: Never    Smokeless tobacco: Never   Substance Use Topics    Alcohol use: Not Currently     Alcohol/week: 0.0 standard drinks of alcohol    Drug use: Never        Physical Exam   Vital Signs: Temp: 98.6  F (37  C) Temp src: Oral BP: 134/89 Pulse: 103   Resp: 16 SpO2: 94 % O2 Device: None (Room air)    Weight: 0 lbs 0 oz    Constitutional: awake, in severe distress  Respiratory: No increased work of breathing, good air exchange, clear to auscultation bilaterally, no crackles or wheezing  Cardiovascular: Tachycardia, regular rhythm, normal S1 and S2, no S3 or S4, and no murmur noted  Musculoskeletal: No lower extremity edema    Data     I have personally reviewed the following data over the past 24 hrs:    16.8 (H)  \   7.0 (L)   / 467 (H)     134 (L) 102 5.2 (L) /  90   3.8 21 (L) 0.55 \     ALT: 20 AST: 46 (H) AP: 73 TBILI: 5.2 (H)   ALB: 4.5 TOT PROTEIN: 7.8 LIPASE: N/A     Trop: <6 BNP: N/A     Ferritin:  N/A % Retic:  23.0 (H) LDH:  N/A     Imaging results reviewed over the past 24 hrs:   Recent Results  (from the past 24 hour(s))   XR Chest 2 Views    Narrative    EXAM: XR CHEST 2 VIEWS  LOCATION: [facility]  DATE: 8/16/2023    INDICATION: Chest pain. History of sickle cell disease.  COMPARISON: Chest x-ray 2 views 08/02/2023 at 1711 hours.      Impression    IMPRESSION: Implanted left chest port, catheter via IJ access, tip at the cavoatrial junction, satisfactory positioning, unchanged. Minor strands of linear atelectasis left base. Both lungs are otherwise clear. Resolved small left pleural effusion seen   previously. No adenopathy or pleural effusion on either side. Normal cardiac size and pulmonary vascularity. Anatomic alignment of the bones and joints.

## 2023-08-17 ENCOUNTER — APPOINTMENT (OUTPATIENT)
Dept: GENERAL RADIOLOGY | Facility: CLINIC | Age: 23
DRG: 812 | End: 2023-08-17
Attending: INTERNAL MEDICINE
Payer: COMMERCIAL

## 2023-08-17 LAB
ALBUMIN UR-MCNC: NEGATIVE MG/DL
APPEARANCE UR: CLEAR
BACTERIA #/AREA URNS HPF: ABNORMAL /HPF
BASOPHILS # BLD AUTO: 0.1 10E3/UL (ref 0–0.2)
BASOPHILS NFR BLD AUTO: 1 %
BILIRUB UR QL STRIP: NEGATIVE
COLOR UR AUTO: YELLOW
EOSINOPHIL # BLD AUTO: 0.1 10E3/UL (ref 0–0.7)
EOSINOPHIL NFR BLD AUTO: 1 %
ERYTHROCYTE [DISTWIDTH] IN BLOOD BY AUTOMATED COUNT: 23.6 % (ref 10–15)
GLUCOSE UR STRIP-MCNC: NEGATIVE MG/DL
HCT VFR BLD AUTO: 17.9 % (ref 35–47)
HCT VFR BLD AUTO: 17.9 % (ref 35–47)
HGB BLD-MCNC: 6.2 G/DL (ref 11.7–15.7)
HGB BLD-MCNC: 6.2 G/DL (ref 11.7–15.7)
HGB UR QL STRIP: ABNORMAL
IMM GRANULOCYTES # BLD: 0.1 10E3/UL
IMM GRANULOCYTES NFR BLD: 1 %
KETONES UR STRIP-MCNC: NEGATIVE MG/DL
LEUKOCYTE ESTERASE UR QL STRIP: NEGATIVE
LYMPHOCYTES # BLD AUTO: 0.7 10E3/UL (ref 0.8–5.3)
LYMPHOCYTES NFR BLD AUTO: 6 %
MCH RBC QN AUTO: 29.2 PG (ref 26.5–33)
MCHC RBC AUTO-ENTMCNC: 34.1 G/DL (ref 31.5–36.5)
MCV RBC AUTO: 86 FL (ref 78–100)
MONOCYTES # BLD AUTO: 0.9 10E3/UL (ref 0–1.3)
MONOCYTES NFR BLD AUTO: 8 %
NEUTROPHILS # BLD AUTO: 9.3 10E3/UL (ref 1.6–8.3)
NEUTROPHILS NFR BLD AUTO: 83 %
NITRATE UR QL: NEGATIVE
NRBC # BLD AUTO: 0.4 10E3/UL
NRBC BLD AUTO-RTO: 3 /100
PH UR STRIP: 7 [PH] (ref 5–7)
PLATELET # BLD AUTO: 407 10E3/UL (ref 150–450)
RBC # BLD AUTO: 2.12 10E6/UL (ref 3.8–5.2)
RBC URINE: <1 /HPF
RETICS # AUTO: 0.47 10E6/UL (ref 0.03–0.1)
RETICS/RBC NFR AUTO: 21.6 % (ref 0.5–2)
SP GR UR STRIP: 1.01 (ref 1–1.03)
SQUAMOUS EPITHELIAL: 2 /HPF
UROBILINOGEN UR STRIP-MCNC: 4 MG/DL
WBC # BLD AUTO: 11.1 10E3/UL (ref 4–11)
WBC URINE: <1 /HPF

## 2023-08-17 PROCEDURE — 36591 DRAW BLOOD OFF VENOUS DEVICE: CPT | Performed by: INTERNAL MEDICINE

## 2023-08-17 PROCEDURE — 71046 X-RAY EXAM CHEST 2 VIEWS: CPT

## 2023-08-17 PROCEDURE — 99232 SBSQ HOSP IP/OBS MODERATE 35: CPT | Performed by: INTERNAL MEDICINE

## 2023-08-17 PROCEDURE — 71046 X-RAY EXAM CHEST 2 VIEWS: CPT | Mod: 26 | Performed by: RADIOLOGY

## 2023-08-17 PROCEDURE — 250N000013 HC RX MED GY IP 250 OP 250 PS 637

## 2023-08-17 PROCEDURE — 250N000011 HC RX IP 250 OP 636

## 2023-08-17 PROCEDURE — 87040 BLOOD CULTURE FOR BACTERIA: CPT | Performed by: HOSPITALIST

## 2023-08-17 PROCEDURE — 81001 URINALYSIS AUTO W/SCOPE: CPT | Performed by: INTERNAL MEDICINE

## 2023-08-17 PROCEDURE — 36415 COLL VENOUS BLD VENIPUNCTURE: CPT | Performed by: HOSPITALIST

## 2023-08-17 PROCEDURE — 85014 HEMATOCRIT: CPT | Performed by: INTERNAL MEDICINE

## 2023-08-17 PROCEDURE — 85045 AUTOMATED RETICULOCYTE COUNT: CPT | Performed by: HOSPITALIST

## 2023-08-17 PROCEDURE — 250N000013 HC RX MED GY IP 250 OP 250 PS 637: Performed by: INTERNAL MEDICINE

## 2023-08-17 PROCEDURE — 250N000011 HC RX IP 250 OP 636: Performed by: HOSPITALIST

## 2023-08-17 PROCEDURE — 120N000002 HC R&B MED SURG/OB UMMC

## 2023-08-17 PROCEDURE — 85025 COMPLETE CBC W/AUTO DIFF WBC: CPT | Performed by: HOSPITALIST

## 2023-08-17 RX ORDER — AZITHROMYCIN 250 MG/1
250 TABLET, FILM COATED ORAL DAILY
Status: DISCONTINUED | OUTPATIENT
Start: 2023-08-18 | End: 2023-08-20 | Stop reason: HOSPADM

## 2023-08-17 RX ORDER — AZITHROMYCIN 250 MG/1
500 TABLET, FILM COATED ORAL ONCE
Status: COMPLETED | OUTPATIENT
Start: 2023-08-17 | End: 2023-08-17

## 2023-08-17 RX ADMIN — HYDROXYUREA 3000 MG: 500 CAPSULE ORAL at 07:36

## 2023-08-17 RX ADMIN — ASPIRIN 81 MG CHEWABLE TABLET 81 MG: 81 TABLET CHEWABLE at 20:36

## 2023-08-17 RX ADMIN — HYDROMORPHONE HYDROCHLORIDE 1 MG: 1 INJECTION, SOLUTION INTRAMUSCULAR; INTRAVENOUS; SUBCUTANEOUS at 17:32

## 2023-08-17 RX ADMIN — HYDROMORPHONE HYDROCHLORIDE 1 MG: 1 INJECTION, SOLUTION INTRAMUSCULAR; INTRAVENOUS; SUBCUTANEOUS at 09:15

## 2023-08-17 RX ADMIN — HYDROMORPHONE HYDROCHLORIDE 1 MG: 1 INJECTION, SOLUTION INTRAMUSCULAR; INTRAVENOUS; SUBCUTANEOUS at 13:39

## 2023-08-17 RX ADMIN — HYDROMORPHONE HYDROCHLORIDE 1 MG: 1 INJECTION, SOLUTION INTRAMUSCULAR; INTRAVENOUS; SUBCUTANEOUS at 04:41

## 2023-08-17 RX ADMIN — HYDROMORPHONE HYDROCHLORIDE 1 MG: 1 INJECTION, SOLUTION INTRAMUSCULAR; INTRAVENOUS; SUBCUTANEOUS at 22:05

## 2023-08-17 RX ADMIN — KETOROLAC TROMETHAMINE 30 MG: 30 INJECTION, SOLUTION INTRAMUSCULAR; INTRAVENOUS at 18:18

## 2023-08-17 RX ADMIN — ASPIRIN 81 MG CHEWABLE TABLET 81 MG: 81 TABLET CHEWABLE at 07:36

## 2023-08-17 RX ADMIN — SENNOSIDES AND DOCUSATE SODIUM 2 TABLET: 50; 8.6 TABLET ORAL at 20:36

## 2023-08-17 RX ADMIN — DEFERASIROX 1440 MG: 360 TABLET, FILM COATED ORAL at 20:36

## 2023-08-17 RX ADMIN — ACETAMINOPHEN 975 MG: 325 TABLET, FILM COATED ORAL at 07:39

## 2023-08-17 RX ADMIN — ACETAMINOPHEN 975 MG: 325 TABLET, FILM COATED ORAL at 13:38

## 2023-08-17 RX ADMIN — CETIRIZINE HYDROCHLORIDE 10 MG: 10 TABLET, FILM COATED ORAL at 07:39

## 2023-08-17 RX ADMIN — OXYCODONE HYDROCHLORIDE 20 MG: 10 TABLET ORAL at 18:18

## 2023-08-17 RX ADMIN — OXYCODONE HYDROCHLORIDE 20 MG: 10 TABLET ORAL at 07:39

## 2023-08-17 RX ADMIN — ACETAMINOPHEN 975 MG: 325 TABLET, FILM COATED ORAL at 20:34

## 2023-08-17 RX ADMIN — OXYCODONE HYDROCHLORIDE 20 MG: 10 TABLET ORAL at 13:38

## 2023-08-17 RX ADMIN — AZITHROMYCIN MONOHYDRATE 500 MG: 250 TABLET ORAL at 13:39

## 2023-08-17 ASSESSMENT — ACTIVITIES OF DAILY LIVING (ADL)
ADLS_ACUITY_SCORE: 18

## 2023-08-17 NOTE — PLAN OF CARE
Goal Outcome Evaluation:      Plan of Care Reviewed With: patient    Overall Patient Progress: no changeOverall Patient Progress: no change    Outcome Evaluation: Pain being managed with PRN IV dilaudid, IV toradol, and PO oxycodone. Pt reports pain in chest and shoulders.      VS: VSS ex for temperature and tachycardia earlier which MD is aware of.   Temp: (!) 103  F (39.4  C) Temp src: Oral BP: 119/84 Pulse: 105   Resp: 18 SpO2: 94 % O2 Device: None (Room air)     O2: >90% on RA, denies SOB or CP.    Output: Voiding without difficulty in BR   Last BM: LBM 8/16 per pt, +flatus, BS active.    Activity: Pt up ad janett, ind in room.    Skin: Skin intact, warm and dry.    Pain: Pain to upper shoulders and chest managed with PRN IV dilaudid, IV toradol, and PRN oxycodone. Per pt, IV ketamine is not effective and declined use.    CMS: CMS intact, denies N/T. DP +2 bilaterally. No swelling.    Dressing: Port a cath dressing CDI   Diet: Regular diet, denies N/V, adequate intake of PO fluids.   LDA: Port a cath SL, blood return noted.    Equipment: Personal belongings at bedside   Plan: Discharge TBD pending pain control.   Additional Info: Hemoglobin 6.2, no need for transfusion per hematology.   Telemetry discontinued.

## 2023-08-17 NOTE — PROGRESS NOTES
[facility]    Medicine Progress Note - Hospitalist Service, GOLD TEAM 16    Date of Admission:  8/15/2023    Assessment & Plan   A: Patient is a 22 y/o woman who has a history of HbSS disease complicated by past acute chest syndrome (last in Sep-2022), past CVA and iron overload secondary to frequent transfusions. Patient presented on 15-Aug-2023 with a sickle cell pain crisis. Patient was febrile this morning. Cause of fever is currently unclear.    P:  1.) Sickle cell pain crisis; patient has HbSS disease and has had acute chest syndrome in the past:  - Per Hematology recommendations, checking CBC with differential and reticulocyte count daily.  - IV hydromorphone, IV ketorolac and PO oxycodone as needed.  - Avoiding transfusion per Hematology recommendations - will confer with Hematology before transfusions.  - Patient on hydroxyurea.    2.) Fever of unclear cause:  - Awaiting blood culture results.  - No antibiotics for now.    3.) Iron overload secondary to frequent transfusions:  - Patient on deferasirox.    4.) History of CVA:  - Patient on aspirin.    5.) Moderate persistent asthma, compensated:  - Patient on maintenance inhaler. Albuterol as needed.  - Monitoring for changes.    6.) Depression:  - Patient on fluoxetine.    7.) Atelectasis:  - Incentive spirometer.       Diet: Regular Diet Adult    Lopez Catheter: Not present  Lines: PRESENT      Port A Cath Single 04/21/21 Left Chest wall-Site Assessment: WDL      Cardiac Monitoring: ACTIVE order. Indication: severe anemia  Code Status: Full Code      Clinically Significant Risk Factors                             # Asthma: noted on problem list        Disposition Plan     Expected Discharge Date: 08/20/2023                  Chirag Johnson MD  Hospitalist Service, GOLD TEAM 16  [facility]  Securely message with [employer] (more info)  Text page via [employer] Paging/Directory    See signed in provider for up to date coverage information  ______________________________________________________________________    Interval History     Patient had fever this morning. Patient noted no cough, no dyspnea, no pleurisy, no dysuria, no hematuria, no rash, no diarrhea and no constipation. Patient noted that sickle cell pain involved her ribs with radiation to the back and also involved both shoulders.    Physical Exam   Vital Signs: Temp: (!) 103  F (39.4  C) Temp src: Oral BP: 119/84 Pulse: 105   Resp: 18 SpO2: 94 % O2 Device: None (Room air)    Weight: 0 lbs 0 oz    General: Patient comfortable, NAD.  Heart: RRR, S1 S2 with soft systolic murmur.  Lungs: Breath sounds present. No crackles/wheezes heard.  Abdomen: Soft, nontender.    Labs noted.  WBC 11.1; Hb 6.2; Platelets 407    CXR: Stable left basilar atelectasis     Medical Decision Making

## 2023-08-17 NOTE — PROGRESS NOTES
Alert and oriented times four. Able to use call light and make needs known. Takes pills whole. PRN IV Dilaudid and Oxycodone given for pain. Ate a PB&J. Hgb remained at 6.2. No replacement. Left chest wall port a cath patent and clamped. Assist of one with transferring to bathroom. Calm and cooperative.     Patient's most recent vital signs are:     Vital signs:  BP: 119/84  Temp: 103  HR: 105  RR: 18  SpO2: 94 %     Patient does not have new respiratory symptoms.  Patient does not have new sore throat.  Patient does not have a fever greater than 99.5.

## 2023-08-17 NOTE — CONSULTS
Hematology Progress Note    Date of Service 08/17/2023  Admit Date 8/15/2023   Initial Consult 08/16/23   Hospital Day: 3     Reason for consult: sickle cell pain crisis  Consult requested by: Dr. Janet MD    History of Present Illness  Jennifer Cervantes is a 23 year old woman with a history of sickle cell disease (HbSS) c/b frequent pain episodes, h/o acute chest syndrome, h/o stroke leading to significant cognitive delays and R UE hemiparesis, iron overload 2/2 chronic transfusions, anxiety, depression, who was admitted 8/15/2023 for sickle cell pain episode.     The pt has been f/up by Dr. Duncan as OP. She takes on hydrea 3000mg daily and on montly sidney infusions (last sidney given on 8/11). She is also on deferasirox 1440mg every evening. Her chelation has been on hold due to vision changes. She is on oxycodone 15mg prn, and she has been trying to decrease her opioid requirement.     This time, the pt states that she started to develop whole body pain over the past several days. She states the pain are similar to what she has had in the past (in her chest, ribs, LEs, UEs), but more severe. She especially states she has severe pain in her B/L shoulders. The patient does not report any SOB, abd pain/n/v/d, dysuria, joint pain, rash, or fever. She cannot think of any trigger that started her pain. She has a baseline R UE weakness from her h/o stroke which has been unchanged.     After she came to the ED, she was started on ketamine gtt, currently at 6mg/hr. She is also on APAP TID, ketorolac 30mg q6hr prn, lidocaine cream. On MIVF 125ml/hr. She states she has been able to take anything po after she came here.     Pain plan per Dr. Duncan's team note:   Plan last reviewed with patient: 7/11/22     Patient background: 23 yo F, enjoys movies and kids though there are times where she does not really want to talk to people. Does not have a lot of social support at home.      Sickle Cell Disease History  Primary  Hematologist Team: Jose Rafael Duncan  PCP: none  Genotype: SS  Acute Pain Crisis Treatment: (avoiding IV opioids for now, which she has agreed to)  ER   Oxycodone 15-20 mg x1  Ketamine 4mg IV x 2--helps with opioid sparing  Toradol 15 mg IV x1   Maintenance IV fluids with LR  Other: Zofran 8 mg IV PRN nausea  Inpatient:  Home oxycodone/Oxycontin regimen, though home oxycodone dosing could be increased to 20 mg to start  PCA plan:   None for now  Other Medications: Zofran  She has had success with ketamine starting at 4mg/h and advancing only to 6mg/h, as 8mg/h made her feel quite poorly..  ASA  Supportive Care: Docusate Senna  Chronic Pain Medications:                          Home regimen Oxycontin 10 mg q12h, oxycodone 15 mg p.o. q.4-6 hours p.r.n. breakthrough pain.  She also continues on Voltaren gel, and Zoloft among other medications.                          -Also benefits from mental health visits, acupuncture  Baseline Hemoglobin: 7 g/dl without chronic transfusions  Hydroxyurea use: Yes  H/O blood transfusions: Yes, several (iron overload) Most recent 11/20/2021  H/O Transfusion Reactions: no  Antibodies:none  H/O Acute Chest Syndrome: Yes  Last episode:9/05/22 (previously 4/26/21, 10/2019)          ICU/intubation: not with 9/2022 admission  H/O Stroke: Yes (managed with chronic transfusions in the past, stopped late Spring 2020)  H/O VTE: Yes (2/2021)  H/O Cholecystectomy or Splenectomy: no  H/O Asthma, Pulm HTN, AVN, Leg Ulcers, Nephropathy, Retinopathy, etc: Iron overload, asthma, chronic lung disease, physical limitations from early stroke    INTERVAL HISTORY  Pain improved with added dilaudid. Still has pain in left shoulder. Had fever to 103 this am No cough no hypoxia    PMH: sickle cell disease (HbSS) c/b frequent pain episodes, h/o acute chest syndrome, h/o stroke  PSH: cholecystectomy  FamHx: sickle cell trait in mother and father  SocHx: non smoker    Meds reviewed in Epic.  Allergies reviewed in  Epic  Comprehensive review of systems performed and otherwise negative unless mentioned above.    Physical Exam  /84 (BP Location: Left arm)   Pulse 105   Temp (!) 103  F (39.4  C) (Oral)   Resp 18   SpO2 94%      Constitutional: Awake, alert, in mild distress due to pain  Eyes: PERRL  ENT: Normocephalic  Respiratory: Non-labored breathing, good air exchange, Squeaking breath sound left base  Cardiovascular: RRR, no murmur noted.  GI: + bowel sounds, soft, non-distended, mildly tender  Skin: No concerning lesions or rash on exposed areas.  Musculoskeletal: No edema lea LEs.  Neurologic: Awake, alert & oriented   Psych: appropriate affect        Recent Labs   Lab 08/17/23  0739   WBC 11.1*   HGB 6.2*  6.2*      MCV 86   RDW 23.6*       Recent Labs   Lab 08/16/23  0743   *   POTASSIUM 3.8   CHLORIDE 102   CO2 22   BUN 6.6   CR 0.54   MICAH 8.6       Recent Labs   Lab 08/16/23  0743 08/16/23  0100   AST 42 46*   ALT 22 20   ALKPHOS 65 73   ALBUMIN 4.2 4.5   PROTTOTAL 7.0 7.8   BILITOTAL 5.7* 5.2*          Recent Labs   Lab 08/17/23  0739 08/15/23  0512 08/11/23  0939   RETICABSCT 0.468*   < > 0.466*   RETP 21.6*   < > 18.7*   SALMA  --   --  4,982*    < > = values in this interval not displayed.       U/A: WBC 1    CXR 8/17/2023   FINDINGS: Left-sided port with tip in the low SVC.  Trachea is midline. Cardiac and mediastinal silhouette is within  normal limits. Stable left basilar streaky opacity. No pleural  effusions. No pneumothoraces. Visualized portion of the upper abdomen  demonstrates nonobstructed bowel gas pattern. Status post  cholecystectomy.    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 The pt was admitted due to uncomplicated sickle cell pain episode. No fever, U/A neg and CXR did not  show any opacity suggesting acute chest syndrome. She is not having any SOB or fever.     2. She has a baseline Hb of 6-7s, currently Hb of 6.2. Acute on chronic H/H drop in the setting of sickle cell pain episode is not uncommon. Would cont to monitor, we may consider RBC transfusion if this further drops but we would like to avoid transfusion as much as possible and also because of her h/o iron overload.     3. The pt is on oxycodone 15mg prn as OP, and she has been trying to gradually taper this down during her recent clinic appts. After admission, the pt c/o having significant pain even after ketamine gtt. We talked to Dr. Duncan, her primary hematologist, who agrees with starting on iv opioid (and she has been started on iv dilaudid prn per primary team as well). For pain mngt, we would recommend cont multimodal pain mngt including ketamine gtt, iv dilaudid prn, oxycodone 20mg prn, scheduled APAP, and ketorolac prn.   4. Fever  The chest xray suggest some atelectasis I wonder about a bit of fluid overload. Would get blood cultures urine culture and stop IV fluids.Add empiric azithromycin. Encourage incentive spirometry  Recommendations:  -check CBC/diff, reticulocyte daily   -cont her OP hydroxyurea 3000mg daily, deferasirox 1440mg daily  -Cont multimodal pain control: ketamine gtt (per pain plan), iv dilaudid prn, oxycodone 20mg prn, scheduled APAP, ketorolac prn, topicals, etc.    -Please discuss with hematology before giving any transfusion. We do not have any specific threshold for transfusion and we would like to avoid transfusion if possible. Ok to monitor today's Hb of 6.2.   -please have T&S sent in case she needs urgent exchange transfusion.   -Azithromcyin 500mg today the 250mg every day x 4 d  -encourage incentive spirometry.  -encourage po fluid intake. Can discontinue iIV fluids We will continue to follow.      Thank you for allowing me to participate in the care of this patient. Please do not  hesitate to contact me if there are any concerns or questions.       Isael Sales M.D.  [phone]

## 2023-08-18 LAB
ABO/RH(D): NORMAL
ANTIBODY SCREEN: NEGATIVE
BASOPHILS # BLD AUTO: 0.3 10E3/UL (ref 0–0.2)
BASOPHILS NFR BLD AUTO: 3 %
BLD PROD TYP BPU: NORMAL
BLD PROD TYP BPU: NORMAL
BLOOD COMPONENT TYPE: NORMAL
BLOOD COMPONENT TYPE: NORMAL
CODING SYSTEM: NORMAL
CODING SYSTEM: NORMAL
CROSSMATCH: NORMAL
CROSSMATCH: NORMAL
EOSINOPHIL # BLD AUTO: 0.4 10E3/UL (ref 0–0.7)
EOSINOPHIL NFR BLD AUTO: 4 %
ERYTHROCYTE [DISTWIDTH] IN BLOOD BY AUTOMATED COUNT: 23.9 % (ref 10–15)
HCT VFR BLD AUTO: 12.7 % (ref 35–47)
HGB BLD-MCNC: 4.4 G/DL (ref 11.7–15.7)
HGB BLD-MCNC: 4.5 G/DL (ref 11.7–15.7)
ISSUE DATE AND TIME: NORMAL
ISSUE DATE AND TIME: NORMAL
LYMPHOCYTES # BLD AUTO: 0.3 10E3/UL (ref 0.8–5.3)
LYMPHOCYTES NFR BLD AUTO: 3 %
MCH RBC QN AUTO: 27.3 PG (ref 26.5–33)
MCHC RBC AUTO-ENTMCNC: 35.4 G/DL (ref 31.5–36.5)
MCV RBC AUTO: 77 FL (ref 78–100)
MONOCYTES # BLD AUTO: 1 10E3/UL (ref 0–1.3)
MONOCYTES NFR BLD AUTO: 10 %
NEUTROPHILS # BLD AUTO: 8.2 10E3/UL (ref 1.6–8.3)
NEUTROPHILS NFR BLD AUTO: 80 %
NRBC # BLD AUTO: 0.5 10E3/UL
NRBC BLD AUTO-RTO: 5 /100
PLATELET # BLD AUTO: 384 10E3/UL (ref 150–450)
RBC # BLD AUTO: 1.65 10E6/UL (ref 3.8–5.2)
RETICS # AUTO: 0.38 10E6/UL (ref 0.03–0.1)
RETICS/RBC NFR AUTO: 22.7 % (ref 0.5–2)
SPECIMEN EXPIRATION DATE: NORMAL
UNIT ABO/RH: NORMAL
UNIT ABO/RH: NORMAL
UNIT NUMBER: NORMAL
UNIT NUMBER: NORMAL
UNIT STATUS: NORMAL
UNIT STATUS: NORMAL
UNIT TYPE ISBT: 9500
UNIT TYPE ISBT: 9500
WBC # BLD AUTO: 10.3 10E3/UL (ref 4–11)

## 2023-08-18 PROCEDURE — 36591 DRAW BLOOD OFF VENOUS DEVICE: CPT | Performed by: INTERNAL MEDICINE

## 2023-08-18 PROCEDURE — 250N000011 HC RX IP 250 OP 636: Mod: JZ | Performed by: INTERNAL MEDICINE

## 2023-08-18 PROCEDURE — 36415 COLL VENOUS BLD VENIPUNCTURE: CPT | Performed by: INTERNAL MEDICINE

## 2023-08-18 PROCEDURE — 86923 COMPATIBILITY TEST ELECTRIC: CPT | Performed by: INTERNAL MEDICINE

## 2023-08-18 PROCEDURE — 85004 AUTOMATED DIFF WBC COUNT: CPT | Performed by: INTERNAL MEDICINE

## 2023-08-18 PROCEDURE — 99232 SBSQ HOSP IP/OBS MODERATE 35: CPT | Performed by: INTERNAL MEDICINE

## 2023-08-18 PROCEDURE — 86850 RBC ANTIBODY SCREEN: CPT | Performed by: INTERNAL MEDICINE

## 2023-08-18 PROCEDURE — 250N000013 HC RX MED GY IP 250 OP 250 PS 637

## 2023-08-18 PROCEDURE — 250N000011 HC RX IP 250 OP 636: Mod: JZ

## 2023-08-18 PROCEDURE — 250N000011 HC RX IP 250 OP 636: Performed by: HOSPITALIST

## 2023-08-18 PROCEDURE — 120N000002 HC R&B MED SURG/OB UMMC

## 2023-08-18 PROCEDURE — 250N000013 HC RX MED GY IP 250 OP 250 PS 637: Performed by: INTERNAL MEDICINE

## 2023-08-18 PROCEDURE — 85045 AUTOMATED RETICULOCYTE COUNT: CPT | Performed by: INTERNAL MEDICINE

## 2023-08-18 PROCEDURE — 85018 HEMOGLOBIN: CPT | Performed by: INTERNAL MEDICINE

## 2023-08-18 RX ORDER — ACETAMINOPHEN 325 MG/1
650 TABLET ORAL ONCE
Status: COMPLETED | OUTPATIENT
Start: 2023-08-18 | End: 2023-08-18

## 2023-08-18 RX ORDER — DIPHENHYDRAMINE HYDROCHLORIDE 50 MG/ML
25 INJECTION INTRAMUSCULAR; INTRAVENOUS ONCE
Status: COMPLETED | OUTPATIENT
Start: 2023-08-18 | End: 2023-08-18

## 2023-08-18 RX ADMIN — ACETAMINOPHEN 650 MG: 325 TABLET, FILM COATED ORAL at 23:51

## 2023-08-18 RX ADMIN — ASPIRIN 81 MG CHEWABLE TABLET 81 MG: 81 TABLET CHEWABLE at 08:52

## 2023-08-18 RX ADMIN — HYDROXYUREA 3000 MG: 500 CAPSULE ORAL at 08:50

## 2023-08-18 RX ADMIN — POLYETHYLENE GLYCOL 3350 17 G: 17 POWDER, FOR SOLUTION ORAL at 08:52

## 2023-08-18 RX ADMIN — HYDROCORTISONE SODIUM SUCCINATE 100 MG: 100 INJECTION, POWDER, FOR SOLUTION INTRAMUSCULAR; INTRAVENOUS at 23:51

## 2023-08-18 RX ADMIN — DEFERASIROX 1440 MG: 360 TABLET, FILM COATED ORAL at 20:54

## 2023-08-18 RX ADMIN — HYDROMORPHONE HYDROCHLORIDE 1 MG: 1 INJECTION, SOLUTION INTRAMUSCULAR; INTRAVENOUS; SUBCUTANEOUS at 10:54

## 2023-08-18 RX ADMIN — HYDROMORPHONE HYDROCHLORIDE 1 MG: 1 INJECTION, SOLUTION INTRAMUSCULAR; INTRAVENOUS; SUBCUTANEOUS at 02:20

## 2023-08-18 RX ADMIN — ENOXAPARIN SODIUM 40 MG: 40 INJECTION SUBCUTANEOUS at 08:53

## 2023-08-18 RX ADMIN — HYDROMORPHONE HYDROCHLORIDE 1 MG: 1 INJECTION, SOLUTION INTRAMUSCULAR; INTRAVENOUS; SUBCUTANEOUS at 15:43

## 2023-08-18 RX ADMIN — ASPIRIN 81 MG CHEWABLE TABLET 81 MG: 81 TABLET CHEWABLE at 20:55

## 2023-08-18 RX ADMIN — ACETAMINOPHEN 975 MG: 325 TABLET, FILM COATED ORAL at 15:42

## 2023-08-18 RX ADMIN — SENNOSIDES AND DOCUSATE SODIUM 1 TABLET: 50; 8.6 TABLET ORAL at 20:55

## 2023-08-18 RX ADMIN — HYDROMORPHONE HYDROCHLORIDE 1 MG: 1 INJECTION, SOLUTION INTRAMUSCULAR; INTRAVENOUS; SUBCUTANEOUS at 20:55

## 2023-08-18 RX ADMIN — HYDROMORPHONE HYDROCHLORIDE 1 MG: 1 INJECTION, SOLUTION INTRAMUSCULAR; INTRAVENOUS; SUBCUTANEOUS at 06:35

## 2023-08-18 RX ADMIN — OXYCODONE HYDROCHLORIDE 20 MG: 10 TABLET ORAL at 08:49

## 2023-08-18 RX ADMIN — DIPHENHYDRAMINE HYDROCHLORIDE 25 MG: 50 INJECTION, SOLUTION INTRAMUSCULAR; INTRAVENOUS at 23:50

## 2023-08-18 RX ADMIN — ACETAMINOPHEN 975 MG: 325 TABLET, FILM COATED ORAL at 20:55

## 2023-08-18 RX ADMIN — CETIRIZINE HYDROCHLORIDE 10 MG: 10 TABLET, FILM COATED ORAL at 08:52

## 2023-08-18 RX ADMIN — SENNOSIDES AND DOCUSATE SODIUM 1 TABLET: 50; 8.6 TABLET ORAL at 08:51

## 2023-08-18 RX ADMIN — SENNOSIDES AND DOCUSATE SODIUM 1 TABLET: 50; 8.6 TABLET ORAL at 08:52

## 2023-08-18 RX ADMIN — AZITHROMYCIN MONOHYDRATE 250 MG: 250 TABLET ORAL at 08:52

## 2023-08-18 RX ADMIN — ACETAMINOPHEN 975 MG: 325 TABLET, FILM COATED ORAL at 08:49

## 2023-08-18 ASSESSMENT — ACTIVITIES OF DAILY LIVING (ADL)
ADLS_ACUITY_SCORE: 18

## 2023-08-18 NOTE — PROGRESS NOTES
[facility]    Medicine Progress Note - Hospitalist Service, GOLD TEAM 16    Date of Admission:  8/15/2023    Assessment & Plan   A: Patient is a 24 y/o woman who has a history of HbSS disease complicated by past acute chest syndrome (last in Sep-2022), past CVA and iron overload secondary to frequent transfusions. Patient presented on 15-Aug-2023 with a sickle cell pain crisis. Patient was febrile yesterday morning. Cause of fever is currently unclear.     P:  1.) Sickle cell pain crisis; patient has HbSS disease and has had acute chest syndrome in the past:  - Per Hematology recommendations, checking CBC with differential and reticulocyte count daily.  - IV hydromorphone, IV ketorolac and PO oxycodone as needed.  - Avoiding transfusion per Hematology recommendations - will confer with Hematology before transfusions.  - Patient on hydroxyurea.     2.) Fever of unclear cause:  - Awaiting blood culture results.  - After discussion with Hematology, patient on azithromycin.     3.) Iron overload secondary to frequent transfusions:  - Patient on deferasirox.     4.) History of CVA:  - Patient on aspirin.     5.) Moderate persistent asthma, compensated:  - Patient on maintenance inhaler. Albuterol as needed.  - Monitoring for changes.     6.) Depression:  - Patient on fluoxetine.     7.) Atelectasis:  - Incentive spirometer.       Diet: Regular Diet Adult    Lopez Catheter: Not present  Lines: PRESENT      Port A Cath Single 04/21/21 Left Chest wall-Site Assessment: WDL      Cardiac Monitoring: ACTIVE order. Indication: severe anemia  Code Status: Full Code      Clinically Significant Risk Factors                             # Asthma: noted on problem list        Disposition Plan     Expected Discharge Date: 08/20/2023                  Chirag Johnson MD  Hospitalist Service, GOLD TEAM 16  [facility]  Securely message with [employer]  (more info)  Text page via [facility] Paging/Directory   See signed in provider for up to date coverage information  ______________________________________________________________________    Interval History     Patient noted pain control improved. Patient noted some cough but noted no pleurisy. Patient noted no new problems.    Physical Exam   Vital Signs: Temp: 98.1  F (36.7  C) Temp src: Oral BP: 121/65 Pulse: 111   Resp: 18 SpO2: 90 % O2 Device: None (Room air)    Weight: 0 lbs 0 oz    General: Patient comfortable, NAD.  Heart: RRR, S1 S2 w/o murmurs.  Lungs: Breath sounds present. No crackles/wheezes heard.  Abdomen: Soft, nontender.    CBC still pending.    Medical Decision Making

## 2023-08-18 NOTE — PROGRESS NOTES
Alert and oriented times four. Able to use call light to make needs known. Tachycardic. Needs stand by assist for transfer. Continent of bowel and bladder. Last BM 8/16. Blood is being redrawn this afternoon due to over diluted samples. IV Dilaudid and oral Oxycodone given for pain. Using incentive spirometer at bedside independently. Port a cath left chest wall SL. No caloric intake on day shift. Encourage food and fluids.     Patient's most recent vital signs are:     Vital signs:  BP: 121/65  Temp: 98.1  HR: 111  RR: 18  SpO2: 90 %     Patient does not have new respiratory symptoms.  Patient does not have new sore throat.  Patient does not have a fever greater than 99.5.

## 2023-08-18 NOTE — PROVIDER NOTIFICATION
"1840 acute care lab called with hemoglobin of 4.5. Dr. Semaj Velasco notified:    \"633 Elsa Billy hemoglobin 4.5 per acute care lab. Do you want a redraw since this is significantly out of her range 6-7 for the past couple days? Any next steps otherwise? Thank you\"    Dr. Grace gave an order for hemoglobin redraw. STAT redraw ordered    2000: lab called to notify that hgb is now 4.4. Provider updated. Patient denies any symptoms such as SOB, chest pain, or other new pain. Vital signs stable. No change in patient status minus decrease in hgb. Awaiting orders following hematology consult.    Hem/onc and Dr. Juan ordered 2 units PRBCs. Type/Cross ordered and complete. Consent obtained and in chart. Oncoming nurse informed to release unit when bloodbank has them prepared.   "

## 2023-08-18 NOTE — CONSULTS
Hematology Progress Note    Date of Service 08/18/2023  Admit Date 8/15/2023   Initial Consult 08/16/23   Hospital Day: 4     Reason for consult: sickle cell pain crisis  Consult requested by: Dr. Janet MD    History of Present Illness  Jennifer Cervantes is a 23 year old woman with a history of sickle cell disease (HbSS) c/b frequent pain episodes, h/o acute chest syndrome, h/o stroke leading to significant cognitive delays and R UE hemiparesis, iron overload 2/2 chronic transfusions, anxiety, depression, who was admitted 8/15/2023 for sickle cell pain episode.     The pt has been f/up by Dr. Duncan as OP. She takes on hydrea 3000mg daily and on montly sidney infusions (last sidney given on 8/11). She is also on deferasirox 1440mg every evening. Her chelation has been on hold due to vision changes. She is on oxycodone 15mg prn, and she has been trying to decrease her opioid requirement.     This time, the pt states that she started to develop whole body pain over the past several days. She states the pain are similar to what she has had in the past (in her chest, ribs, LEs, UEs), but more severe. She especially states she has severe pain in her B/L shoulders. The patient does not report any SOB, abd pain/n/v/d, dysuria, joint pain, rash, or fever. She cannot think of any trigger that started her pain. She has a baseline R UE weakness from her h/o stroke which has been unchanged.     After she came to the ED, she was started on ketamine gtt, currently at 6mg/hr. She is also on APAP TID, ketorolac 30mg q6hr prn, lidocaine cream. On MIVF 125ml/hr. She states she has been able to take anything po after she came here.     Pain plan per Dr. Duncan's team note:   Plan last reviewed with patient: 7/11/22     Patient background: 23 yo F, enjoys movies and kids though there are times where she does not really want to talk to people. Does not have a lot of social support at home.      Sickle Cell Disease History  Primary  Hematologist Team: Jose Rafael Duncan  PCP: none  Genotype: SS  Acute Pain Crisis Treatment: (avoiding IV opioids for now, which she has agreed to)  ER   Oxycodone 15-20 mg x1  Ketamine 4mg IV x 2--helps with opioid sparing  Toradol 15 mg IV x1   Maintenance IV fluids with LR  Other: Zofran 8 mg IV PRN nausea  Inpatient:  Home oxycodone/Oxycontin regimen, though home oxycodone dosing could be increased to 20 mg to start  PCA plan:   None for now  Other Medications: Zofran  She has had success with ketamine starting at 4mg/h and advancing only to 6mg/h, as 8mg/h made her feel quite poorly..  ASA  Supportive Care: Docusate, Senna  Chronic Pain Medications:                          Home regimen Oxycontin 10 mg q12h, oxycodone 15 mg p.o. q.4-6 hours p.r.n. breakthrough pain.  She also continues on Voltaren gel, and Zoloft among other medications.                          -Also benefits from mental health visits, acupuncture  Baseline Hemoglobin: 7 g/dl without chronic transfusions  Hydroxyurea use: Yes  H/O blood transfusions: Yes, several (iron overload) Most recent 11/20/2021  H/O Transfusion Reactions: no  Antibodies:none  H/O Acute Chest Syndrome: Yes  Last episode:9/05/22 (previously 4/26/21, 10/2019)          ICU/intubation: not with 9/2022 admission  H/O Stroke: Yes (managed with chronic transfusions in the past, stopped late Spring 2020)  H/O VTE: Yes (2/2021)  H/O Cholecystectomy or Splenectomy: no  H/O Asthma, Pulm HTN, AVN, Leg Ulcers, Nephropathy, Retinopathy, etc: Iron overload, asthma, chronic lung disease, physical limitations from early stroke    INTERVAL HISTORY  Afebrile overnight Still has left shoulder pain No cough no hypoxia    PMH: sickle cell disease (HbSS) c/b frequent pain episodes, h/o acute chest syndrome, h/o stroke  PSH: cholecystectomy  FamHx: sickle cell trait in mother and father  SocHx: non smoker    Meds reviewed in Epic.  Allergies reviewed in Epic  Comprehensive review of systems  performed and otherwise negative unless mentioned above.    Physical Exam  /65 (BP Location: Left arm)   Pulse 111   Temp 98.1  F (36.7  C) (Oral)   Resp 18   SpO2 90%      Constitutional: Awake, alert, in mild distress due to pain  Eyes: PERRL  ENT: Normocephalic  Respiratory: Non-labored breathing, good air exchange, Clear to P and A  Cardiovascular: RRR, 1/6SEM  GI: + bowel sounds, soft, non-distended, mildly tender  Skin: No concerning lesions or rash on exposed areas.  Musculoskeletal: No edema lea LEs.  Neurologic: Awake, alert & oriented   Psych: appropriate affect        Recent Labs   Lab 08/17/23  0739   WBC 11.1*   HGB 6.2*  6.2*      MCV 86   RDW 23.6*       Recent Labs   Lab 08/16/23  0743   *   POTASSIUM 3.8   CHLORIDE 102   CO2 22   BUN 6.6   CR 0.54   MICAH 8.6       Recent Labs   Lab 08/16/23  0743 08/16/23  0100   AST 42 46*   ALT 22 20   ALKPHOS 65 73   ALBUMIN 4.2 4.5   PROTTOTAL 7.0 7.8   BILITOTAL 5.7* 5.2*          Recent Labs   Lab 08/17/23  0739   RETICABSCT 0.468*   RETP 21.6*       U/A: WBC 1    CXR 8/17/2023   FINDINGS: Left-sided port with tip in the low SVC.  Trachea is midline. Cardiac and mediastinal silhouette is within  normal limits. Stable left basilar streaky opacity. No pleural  effusions. No pneumothoraces. Visualized portion of the upper abdomen  demonstrates nonobstructed bowel gas pattern. Status post  cholecystectomy.    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 The pt was admitted due to uncomplicated sickle cell pain episode. No fever, U/A neg and CXR did not show any opacity suggesting acute chest syndrome. She is not having any SOB or fever.     2. She has a baseline Hb of 6-7s, currently Hb of 6.2. Acute on chronic H/H drop in the setting of  sickle cell pain episode is not uncommon. Would cont to monitor, we may consider RBC transfusion if this further drops but we would like to avoid transfusion as much as possible and also because of her h/o iron overload.     3. The pt is on oxycodone 15mg prn as OP, and she has been trying to gradually taper this down during her recent clinic appts. After admission, the pt c/o having significant pain even after ketamine gtt. We talked to Dr. Duncan, her primary hematologist, who agrees with starting on iv opioid (and she has been started on iv dilaudid prn per primary team as well). For pain mngt, we would recommend cont multimodal pain mngt including ketamine gtt, iv dilaudid prn, oxycodone 20mg prn, scheduled APAP, and ketorolac prn.   Would continue today Firday Iv dilaudid PRN and then consider taper tomorrow and hopefully discharge Sunday  4. Fever  Afebrile continue empiric azithromycin. Encourage incentive spirometry  Recommendations:  -check CBC/diff, reticulocyte daily   -cont her OP hydroxyurea 3000mg daily, deferasirox 1440mg daily  -Cont multimodal pain control: ketamine gtt (per pain plan), iv dilaudid prn, oxycodone 20mg prn, scheduled APAP, ketorolac prn, topicals, etc.    -Please discuss with hematology before giving any transfusion. We do not have any specific threshold for transfusion and we would like to avoid transfusion if possible. Ok to monitor today's Hb of 6.2.   -please have T&S sent in case she needs urgent exchange transfusion.   -Azithromcyin 500mg today the 250mg every day x 4 d  -encourage incentive spirometry.  -encourage po fluid intake. Can discontinue iIV fluids We will continue to follow.      Thank you for allowing me to participate in the care of this patient. Please do not hesitate to contact me if there are any concerns or questions.       Isael Sales M.D.  [phone]

## 2023-08-18 NOTE — PLAN OF CARE
Please refer to flowsheet for full patient assessment and MAR for all medications administered during shift.           Pt A&Ox4, VSS, Fall precautions maintained with bed locked and in    lowest position. Pain managed with PRN meds (see mar) POC discussed, questions encouraged and answered. Plan for today TBD. No acute events during shift. Frequent round and checks done. POC continues.

## 2023-08-19 LAB
ALBUMIN SERPL BCG-MCNC: 3.8 G/DL (ref 3.5–5.2)
ALP SERPL-CCNC: 66 U/L (ref 35–104)
ALT SERPL W P-5'-P-CCNC: 22 U/L (ref 0–50)
ANION GAP SERPL CALCULATED.3IONS-SCNC: 8 MMOL/L (ref 7–15)
AST SERPL W P-5'-P-CCNC: 41 U/L (ref 0–45)
BASOPHILS # BLD MANUAL: 0.1 10E3/UL (ref 0–0.2)
BASOPHILS NFR BLD MANUAL: 1 %
BILIRUB SERPL-MCNC: 4 MG/DL
BUN SERPL-MCNC: 7.7 MG/DL (ref 6–20)
CALCIUM SERPL-MCNC: 8.6 MG/DL (ref 8.6–10)
CHLORIDE SERPL-SCNC: 103 MMOL/L (ref 98–107)
CREAT SERPL-MCNC: 0.48 MG/DL (ref 0.51–0.95)
DEPRECATED HCO3 PLAS-SCNC: 23 MMOL/L (ref 22–29)
EOSINOPHIL # BLD MANUAL: 0 10E3/UL (ref 0–0.7)
EOSINOPHIL NFR BLD MANUAL: 0 %
ERYTHROCYTE [DISTWIDTH] IN BLOOD BY AUTOMATED COUNT: 21.5 % (ref 10–15)
FRAGMENTS BLD QL SMEAR: SLIGHT
GFR SERPL CREATININE-BSD FRML MDRD: >90 ML/MIN/1.73M2
GLUCOSE SERPL-MCNC: 113 MG/DL (ref 70–99)
HCT VFR BLD AUTO: 19.3 % (ref 35–47)
HGB BLD-MCNC: 7 G/DL (ref 11.7–15.7)
HOLD SPECIMEN: NORMAL
LYMPHOCYTES # BLD MANUAL: 0.8 10E3/UL (ref 0.8–5.3)
LYMPHOCYTES NFR BLD MANUAL: 11 %
MCH RBC QN AUTO: 28 PG (ref 26.5–33)
MCHC RBC AUTO-ENTMCNC: 36.3 G/DL (ref 31.5–36.5)
MCV RBC AUTO: 77 FL (ref 78–100)
MONOCYTES # BLD MANUAL: 0.4 10E3/UL (ref 0–1.3)
MONOCYTES NFR BLD MANUAL: 6 %
MYELOCYTES # BLD MANUAL: 0.1 10E3/UL
MYELOCYTES NFR BLD MANUAL: 2 %
NEUTROPHILS # BLD MANUAL: 5.6 10E3/UL (ref 1.6–8.3)
NEUTROPHILS NFR BLD MANUAL: 80 %
NRBC # BLD AUTO: 3 10E3/UL
NRBC BLD MANUAL-RTO: 43 %
PLAT MORPH BLD: ABNORMAL
PLATELET # BLD AUTO: 405 10E3/UL (ref 150–450)
POLYCHROMASIA BLD QL SMEAR: SLIGHT
POTASSIUM SERPL-SCNC: 3.9 MMOL/L (ref 3.4–5.3)
PROT SERPL-MCNC: 6.7 G/DL (ref 6.4–8.3)
RBC # BLD AUTO: 2.5 10E6/UL (ref 3.8–5.2)
RBC MORPH BLD: ABNORMAL
RETICS # AUTO: 0.44 10E6/UL (ref 0.03–0.1)
RETICS/RBC NFR AUTO: 17.4 % (ref 0.5–2)
SICKLE CELLS BLD QL SMEAR: ABNORMAL
SODIUM SERPL-SCNC: 134 MMOL/L (ref 136–145)
TARGETS BLD QL SMEAR: ABNORMAL
WBC # BLD AUTO: 7 10E3/UL (ref 4–11)

## 2023-08-19 PROCEDURE — 99232 SBSQ HOSP IP/OBS MODERATE 35: CPT | Performed by: INTERNAL MEDICINE

## 2023-08-19 PROCEDURE — 80053 COMPREHEN METABOLIC PANEL: CPT | Performed by: HOSPITALIST

## 2023-08-19 PROCEDURE — 250N000013 HC RX MED GY IP 250 OP 250 PS 637: Performed by: INTERNAL MEDICINE

## 2023-08-19 PROCEDURE — 85045 AUTOMATED RETICULOCYTE COUNT: CPT | Performed by: INTERNAL MEDICINE

## 2023-08-19 PROCEDURE — P9016 RBC LEUKOCYTES REDUCED: HCPCS | Performed by: INTERNAL MEDICINE

## 2023-08-19 PROCEDURE — 250N000011 HC RX IP 250 OP 636: Mod: JZ

## 2023-08-19 PROCEDURE — 85027 COMPLETE CBC AUTOMATED: CPT | Performed by: INTERNAL MEDICINE

## 2023-08-19 PROCEDURE — 85007 BL SMEAR W/DIFF WBC COUNT: CPT | Performed by: INTERNAL MEDICINE

## 2023-08-19 PROCEDURE — 36591 DRAW BLOOD OFF VENOUS DEVICE: CPT | Performed by: HOSPITALIST

## 2023-08-19 PROCEDURE — 250N000013 HC RX MED GY IP 250 OP 250 PS 637

## 2023-08-19 PROCEDURE — 250N000011 HC RX IP 250 OP 636: Performed by: HOSPITALIST

## 2023-08-19 PROCEDURE — 120N000002 HC R&B MED SURG/OB UMMC

## 2023-08-19 RX ADMIN — HYDROMORPHONE HYDROCHLORIDE 1 MG: 1 INJECTION, SOLUTION INTRAMUSCULAR; INTRAVENOUS; SUBCUTANEOUS at 06:02

## 2023-08-19 RX ADMIN — HYDROMORPHONE HYDROCHLORIDE 0.5 MG: 1 INJECTION, SOLUTION INTRAMUSCULAR; INTRAVENOUS; SUBCUTANEOUS at 18:06

## 2023-08-19 RX ADMIN — HYDROMORPHONE HYDROCHLORIDE 0.5 MG: 1 INJECTION, SOLUTION INTRAMUSCULAR; INTRAVENOUS; SUBCUTANEOUS at 13:34

## 2023-08-19 RX ADMIN — SENNOSIDES AND DOCUSATE SODIUM 2 TABLET: 50; 8.6 TABLET ORAL at 20:23

## 2023-08-19 RX ADMIN — ASPIRIN 81 MG CHEWABLE TABLET 81 MG: 81 TABLET CHEWABLE at 20:23

## 2023-08-19 RX ADMIN — ACETAMINOPHEN 975 MG: 325 TABLET, FILM COATED ORAL at 16:08

## 2023-08-19 RX ADMIN — ASPIRIN 81 MG CHEWABLE TABLET 81 MG: 81 TABLET CHEWABLE at 11:06

## 2023-08-19 RX ADMIN — KETOROLAC TROMETHAMINE 30 MG: 30 INJECTION, SOLUTION INTRAMUSCULAR; INTRAVENOUS at 16:09

## 2023-08-19 RX ADMIN — DEFERASIROX 1440 MG: 360 TABLET, FILM COATED ORAL at 20:22

## 2023-08-19 RX ADMIN — AZITHROMYCIN MONOHYDRATE 250 MG: 250 TABLET ORAL at 11:07

## 2023-08-19 RX ADMIN — CETIRIZINE HYDROCHLORIDE 10 MG: 10 TABLET, FILM COATED ORAL at 11:07

## 2023-08-19 RX ADMIN — HYDROMORPHONE HYDROCHLORIDE 1 MG: 1 INJECTION, SOLUTION INTRAMUSCULAR; INTRAVENOUS; SUBCUTANEOUS at 01:01

## 2023-08-19 RX ADMIN — HYDROMORPHONE HYDROCHLORIDE 0.5 MG: 1 INJECTION, SOLUTION INTRAMUSCULAR; INTRAVENOUS; SUBCUTANEOUS at 23:12

## 2023-08-19 RX ADMIN — HYDROMORPHONE HYDROCHLORIDE 1 MG: 1 INJECTION, SOLUTION INTRAMUSCULAR; INTRAVENOUS; SUBCUTANEOUS at 15:05

## 2023-08-19 RX ADMIN — ENOXAPARIN SODIUM 40 MG: 40 INJECTION SUBCUTANEOUS at 13:31

## 2023-08-19 RX ADMIN — HYDROMORPHONE HYDROCHLORIDE 1 MG: 1 INJECTION, SOLUTION INTRAMUSCULAR; INTRAVENOUS; SUBCUTANEOUS at 11:02

## 2023-08-19 RX ADMIN — KETOROLAC TROMETHAMINE 30 MG: 30 INJECTION, SOLUTION INTRAMUSCULAR; INTRAVENOUS at 01:15

## 2023-08-19 RX ADMIN — HYDROXYUREA 3000 MG: 500 CAPSULE ORAL at 11:06

## 2023-08-19 RX ADMIN — ACETAMINOPHEN 975 MG: 325 TABLET, FILM COATED ORAL at 11:06

## 2023-08-19 RX ADMIN — HYDROMORPHONE HYDROCHLORIDE 1 MG: 1 INJECTION, SOLUTION INTRAMUSCULAR; INTRAVENOUS; SUBCUTANEOUS at 19:11

## 2023-08-19 ASSESSMENT — ACTIVITIES OF DAILY LIVING (ADL)
ADLS_ACUITY_SCORE: 18

## 2023-08-19 NOTE — PLAN OF CARE
Please refer to flowsheet for full patient assessment and MAR for all medications administered during shift.        Pt A&Ox4, VSS, NSR on tele. Fall precautions maintained with bed locked and in lowest position. Pain managed with prn meds (see mar). POC discussed, questions encouraged and answered. Plan for today TBD. Pt received 2 units of blood during shift, pt tolerated transfusion. No acute events during shift. Frequent round and checks done. POC continues.

## 2023-08-19 NOTE — PROGRESS NOTES
[facility]    Medicine Progress Note - Hospitalist Service, GOLD TEAM 16    Date of Admission:  8/15/2023    Assessment & Plan   A: Patient is a 22 y/o woman who has a history of HbSS disease complicated by past acute chest syndrome (last in Sep-2022), past CVA and iron overload secondary to frequent transfusions. Patient presented on 15-Aug-2023 with a sickle cell pain crisis. Patient was febrile on 17-Aug-2023. Cause of fever is currently unclear. Fever has not recurred.    Patient had a hemoglobin of 4.4 that increased to 7.0 following transfusion. Cause of acute anemia is unclear.    Patient had pain in ribs radiating to her back and bilateral shoulder pain. Pain in ribs and in right shoulder has resolved but patient has reported continued pain following her left clavicle. This potentially could still be from sickle cell pain crisis.     P:  1.) Sickle cell pain crisis; patient has HbSS disease and has had acute chest syndrome in the past:  - Per Hematology recommendations, checking CBC with differential and reticulocyte count daily.  - IV hydromorphone, IV ketorolac and PO oxycodone as needed.  - Patient on hydroxyurea.     2.) Acute on chronic anemia:  - Patient was transfused yesterday evening.  - Monitoring blood counts.  - Avoiding transfusion per Hematology recommendations - will confer with Hematology before transfusions.    3.) Fever of unclear cause:  - Awaiting blood culture results.  - After discussion with Hematology, patient on azithromycin.     4.) Iron overload secondary to frequent transfusions:  - Patient on deferasirox.     5.) History of CVA:  - Patient on aspirin.     6.) Moderate persistent asthma, compensated:  - Patient on maintenance inhaler. Albuterol as needed.  - Monitoring for changes.     7.) Depression:  - Patient on fluoxetine.     8.) Atelectasis:  - Incentive spirometer.       Diet: Regular Diet Adult    Lopez Catheter: Not  present  Lines: PRESENT      Port A Cath Single 04/21/21 Left Chest wall-Site Assessment: WDL      Cardiac Monitoring: ACTIVE order. Indication: sickle cell crisis  Code Status: Full Code      Clinically Significant Risk Factors                             # Asthma: noted on problem list        Disposition Plan     Expected Discharge Date: 08/20/2023                  Chirag Johnson MD  Hospitalist Service, GOLD TEAM 16  [facility]  Securely message with [employer] (more info)  Text page via [email or web address] Paging/Directory   See signed in provider for up to date coverage information  ______________________________________________________________________    Interval History   Patient noted that rib pain and right shoulder pain have resolved. Patient noted increased pain superior to port site since transfusion yesterday evening, roughly following the left clavicle. Patient noted no erythema, no swelling and no drainage around the port site.    Physical Exam   Vital Signs: Temp: 98.9  F (37.2  C) Temp src: Oral BP: 97/66 Pulse: 92   Resp: 18 SpO2: 97 % O2 Device: None (Room air) Oxygen Delivery: 1 LPM    General: Patient comfortable, NAD.  Heart: RRR, S1 S2 w/o murmurs.  Lungs: Breath sounds present. No crackles/wheezes heard.  Abdomen: Soft, nontender.  Skin: No erythema, no swelling and no drainage seen around port site in left chest.    Medical Decision Making

## 2023-08-19 NOTE — PROGRESS NOTES
Brief Hematology-Oncology Progress Note    23-year-old woman with HgbSS admitted for pain crisis.    On-call team notified of Hgb 4.5 g/dL, confirmed on repeat, from 6.2 yesterday (baseline). Other cell lines are stable.  Patient is reportedly maintaining O2 sats on room air and hemodynamically stable.  A CXR from this AM obtained for fever showed no new findings.    While we want to lower the risks of alloimmunization against RBC antigens and of Fe overload, it is reasonable to transfuse given the acute 2-g drop with adequate premedication.    Plan:  - 2 unit(s) PRBCs  -  Premedicate with 100 mg hydrocortisone, 25 diphenhydramine, and 650 mg APAP      Discussed with Dr Sales.      Ifeanyi Rios  PGY4 - Hematology, Oncology, and Transplantation  p

## 2023-08-19 NOTE — PROGRESS NOTES
Hematology Progress Note    Date of Service 08/19/2023  Admit Date 8/15/2023   Initial Consult 08/16/23   Hospital Day: 5     Reason for consult: sickle cell pain crisis  Consult requested by: Dr. Janet MD    History of Present Illness  Jennifer Cervantes is a 23 year old woman with a history of sickle cell disease (HbSS) c/b frequent pain episodes, h/o acute chest syndrome, h/o stroke leading to significant cognitive delays and R UE hemiparesis, iron overload 2/2 chronic transfusions, anxiety, depression, who was admitted 8/15/2023 for sickle cell pain episode.     The pt has been f/up by Dr. Duncan as OP. She takes on hydrea 3000mg daily and on montly sidney infusions (last sidney given on 8/11). She is also on deferasirox 1440mg every evening. Her chelation has been on hold due to vision changes. She is on oxycodone 15mg prn, and she has been trying to decrease her opioid requirement.     This time, the pt states that she started to develop whole body pain over the past several days. She states the pain are similar to what she has had in the past (in her chest, ribs, LEs, UEs), but more severe. She especially states she has severe pain in her B/L shoulders. The patient does not report any SOB, abd pain/n/v/d, dysuria, joint pain, rash, or fever. She cannot think of any trigger that started her pain. She has a baseline R UE weakness from her h/o stroke which has been unchanged.     After she came to the ED, she was started on ketamine gtt, currently at 6mg/hr. She is also on APAP TID, ketorolac 30mg q6hr prn, lidocaine cream. On MIVF 125ml/hr. She states she has been able to take anything po after she came here.     Pain plan per Dr. Duncan's team note:   Plan last reviewed with patient: 7/11/22     Patient background: 21 yo F, enjoys movies and kids though there are times where she does not really want to talk to people. Does not have a lot of social support at home.      Sickle Cell Disease History  Primary  Hematologist Team: Jose Rafael Duncan  PCP: none  Genotype: SS  Acute Pain Crisis Treatment: (avoiding IV opioids for now, which she has agreed to)  ER   Oxycodone 15-20 mg x1  Ketamine 4mg IV x 2--helps with opioid sparing  Toradol 15 mg IV x1   Maintenance IV fluids with LR  Other: Zofran 8 mg IV PRN nausea  Inpatient:  Home oxycodone/Oxycontin regimen, though home oxycodone dosing could be increased to 20 mg to start  PCA plan:   None for now  Other Medications: Zofran  She has had success with ketamine starting at 4mg/h and advancing only to 6mg/h, as 8mg/h made her feel quite poorly..  ASA  Supportive Care: Docusate, Senna  Chronic Pain Medications:                          Home regimen Oxycontin 10 mg q12h, oxycodone 15 mg p.o. q.4-6 hours p.r.n. breakthrough pain.  She also continues on Voltaren gel, and Zoloft among other medications.                          -Also benefits from mental health visits, acupuncture  Baseline Hemoglobin: 7 g/dl without chronic transfusions  Hydroxyurea use: Yes  H/O blood transfusions: Yes, several (iron overload) Most recent 11/20/2021  H/O Transfusion Reactions: no  Antibodies:none  H/O Acute Chest Syndrome: Yes  Last episode:9/05/22 (previously 4/26/21, 10/2019)          ICU/intubation: not with 9/2022 admission  H/O Stroke: Yes (managed with chronic transfusions in the past, stopped late Spring 2020)  H/O VTE: Yes (2/2021)  H/O Cholecystectomy or Splenectomy: no  H/O Asthma, Pulm HTN, AVN, Leg Ulcers, Nephropathy, Retinopathy, etc: Iron overload, asthma, chronic lung disease, physical limitations from early stroke    INTERVAL HISTORY   Hgb to 6.6 today Has Less pain off IV dilaudid and ketamine No hypoxia O2 Sat 93% on RA Afebrile no cough    PMH: sickle cell disease (HbSS) c/b frequent pain episodes, h/o acute chest syndrome, h/o stroke  PSH: cholecystectomy  FamHx: sickle cell trait in mother and father  SocHx: non smoker    Meds reviewed in Epic.  Allergies reviewed in  Epic  Comprehensive review of systems performed and otherwise negative unless mentioned above.    Physical Exam  BP 97/63   Pulse 85   Temp 97.6  F (36.4  C) (Oral)   Resp 20   SpO2 96%      Constitutional: Awake, alert, in mild distress due to pain  Eyes: PERRL  ENT: Normocephalic  Respiratory: Non-labored breathing, good air exchange, Clear to P and A  Cardiovascular: RRR, 1/6SEM  GI: + bowel sounds, soft, non-distended, mildly tender  Skin: No concerning lesions or rash on exposed areas.  Musculoskeletal: No edema lea LEs.  Neurologic: Awake, alert & oriented   Psych: appropriate affect        Recent Labs   Lab 08/19/23  0923   WBC 7.0   HGB 7.0*      MCV 77*   RDW 21.5*   ANEU 5.6   ALYM 0.8   MELIDA 0.4   AEOS 0.0       Recent Labs   Lab 08/19/23  0923   *   POTASSIUM 3.9   CHLORIDE 103   CO2 23   BUN 7.7   CR 0.48*   MICAH 8.6       Recent Labs   Lab 08/19/23  0923 08/16/23  0743 08/16/23  0100   AST 41 42 46*   ALT 22 22 20   ALKPHOS 66 65 73   ALBUMIN 3.8 4.2 4.5   PROTTOTAL 6.7 7.0 7.8   BILITOTAL 4.0* 5.7* 5.2*          Recent Labs   Lab 08/19/23 0923   RETICABSCT 0.436*   RETP 17.4*       U/A: WBC 1    CXR 8/17/2023   FINDINGS: Left-sided port with tip in the low SVC.  Trachea is midline. Cardiac and mediastinal silhouette is within  normal limits. Stable left basilar streaky opacity. No pleural  effusions. No pneumothoraces. Visualized portion of the upper abdomen  demonstrates nonobstructed bowel gas pattern. Status post  cholecystectomy.    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  Doing well off  Iv dilaudid   hopefully discharge today follow up with DR Duncan   Fever  Afebrile continue empiric azithromycin.No need to discharge on azithro    Recommendations:  -Thank you for allowing  me to participate in the care of this patient. Please do not hesitate to contact me if there are any concerns or questions.       Isael Sales M.D.  [phone]

## 2023-08-19 NOTE — PROGRESS NOTES
Patient's hemoglobin 4.5->4.4 (repeat). No new symptom-- no cp or sob. Vss. On RA  Dw Hematology. See note.   Will transfuse 2 Units with premedications with HC, benadryl and tylenol per heme.   Ct to monitor.     Cross Cover.     Semaj Velasco MD  [facility]  Contact information available via [facility] Paging/Directory          Vitals:    08/17/23 2034 08/17/23 2348 08/18/23 0800 08/18/23 1900   BP:  120/70 121/65 106/75   BP Location:  Right arm Left arm Left arm   Patient Position:    Supine   Pulse:  112 111 109   Resp:  18 18 20   Temp: 98.2  F (36.8  C) 98.6  F (37  C) 98.1  F (36.7  C) 98.7  F (37.1  C)   TempSrc:  Oral Oral Oral   SpO2:  90% 90% 95%

## 2023-08-19 NOTE — PROGRESS NOTES
Alert and oriented x4 Able to use call light to make needs known. Tachycardic. Needs stand by assist for transfer. Continent of bowel and bladder. Last BM 8/18. IV Dilaudid given for pain. Using incentive spirometer at bedside independently. Port a cath left chest wall SL. No caloric intake on day shift. Encourage food and fluids.     /75 (BP Location: Left arm, Patient Position: Supine)   Pulse 109   Temp 98.7  F (37.1  C) (Oral)   Resp 20   SpO2 95%      Denies SOB, CP, cough, or any new onset symptoms. Reports feeling a bit tired.    CRITICAL LAB: hgb 4.5 and 4.4 on repeat. See provider notification note for details

## 2023-08-20 VITALS
DIASTOLIC BLOOD PRESSURE: 75 MMHG | RESPIRATION RATE: 16 BRPM | OXYGEN SATURATION: 93 % | HEART RATE: 87 BPM | SYSTOLIC BLOOD PRESSURE: 128 MMHG | TEMPERATURE: 98.2 F

## 2023-08-20 LAB
BASOPHILS # BLD MANUAL: 0 10E3/UL (ref 0–0.2)
BASOPHILS NFR BLD MANUAL: 0 %
EOSINOPHIL # BLD MANUAL: 0.1 10E3/UL (ref 0–0.7)
EOSINOPHIL NFR BLD MANUAL: 2 %
ERYTHROCYTE [DISTWIDTH] IN BLOOD BY AUTOMATED COUNT: 23 % (ref 10–15)
FRAGMENTS BLD QL SMEAR: SLIGHT
HCT VFR BLD AUTO: 19.2 % (ref 35–47)
HGB BLD-MCNC: 6.6 G/DL (ref 11.7–15.7)
LYMPHOCYTES # BLD MANUAL: 1.9 10E3/UL (ref 0.8–5.3)
LYMPHOCYTES NFR BLD MANUAL: 25 %
MCH RBC QN AUTO: 27.2 PG (ref 26.5–33)
MCHC RBC AUTO-ENTMCNC: 34.4 G/DL (ref 31.5–36.5)
MCV RBC AUTO: 79 FL (ref 78–100)
MONOCYTES # BLD MANUAL: 0.7 10E3/UL (ref 0–1.3)
MONOCYTES NFR BLD MANUAL: 9 %
NEUTROPHILS # BLD MANUAL: 4.7 10E3/UL (ref 1.6–8.3)
NEUTROPHILS NFR BLD MANUAL: 64 %
NRBC # BLD AUTO: 7.8 10E3/UL
NRBC BLD MANUAL-RTO: 106 %
PLAT MORPH BLD: ABNORMAL
PLATELET # BLD AUTO: 350 10E3/UL (ref 150–450)
POLYCHROMASIA BLD QL SMEAR: SLIGHT
RBC # BLD AUTO: 2.43 10E6/UL (ref 3.8–5.2)
RBC MORPH BLD: ABNORMAL
RETICS # AUTO: 0.37 10E6/UL (ref 0.03–0.1)
RETICS/RBC NFR AUTO: 16.1 % (ref 0.5–2)
SICKLE CELLS BLD QL SMEAR: SLIGHT
TARGETS BLD QL SMEAR: SLIGHT
WBC # BLD AUTO: 7.4 10E3/UL (ref 4–11)

## 2023-08-20 PROCEDURE — 85027 COMPLETE CBC AUTOMATED: CPT | Performed by: INTERNAL MEDICINE

## 2023-08-20 PROCEDURE — 250N000011 HC RX IP 250 OP 636: Performed by: INTERNAL MEDICINE

## 2023-08-20 PROCEDURE — 250N000011 HC RX IP 250 OP 636: Performed by: HOSPITALIST

## 2023-08-20 PROCEDURE — 99238 HOSP IP/OBS DSCHRG MGMT 30/<: CPT | Performed by: INTERNAL MEDICINE

## 2023-08-20 PROCEDURE — 85045 AUTOMATED RETICULOCYTE COUNT: CPT | Performed by: INTERNAL MEDICINE

## 2023-08-20 PROCEDURE — 250N000011 HC RX IP 250 OP 636: Mod: JZ

## 2023-08-20 PROCEDURE — 36592 COLLECT BLOOD FROM PICC: CPT | Performed by: INTERNAL MEDICINE

## 2023-08-20 PROCEDURE — 85007 BL SMEAR W/DIFF WBC COUNT: CPT | Performed by: INTERNAL MEDICINE

## 2023-08-20 PROCEDURE — 250N000013 HC RX MED GY IP 250 OP 250 PS 637

## 2023-08-20 PROCEDURE — 250N000013 HC RX MED GY IP 250 OP 250 PS 637: Performed by: INTERNAL MEDICINE

## 2023-08-20 RX ORDER — HEPARIN SODIUM,PORCINE 10 UNIT/ML
5-10 VIAL (ML) INTRAVENOUS
Status: DISCONTINUED | OUTPATIENT
Start: 2023-08-20 | End: 2023-08-20 | Stop reason: HOSPADM

## 2023-08-20 RX ORDER — HEPARIN SODIUM,PORCINE 10 UNIT/ML
5-10 VIAL (ML) INTRAVENOUS EVERY 24 HOURS
Status: DISCONTINUED | OUTPATIENT
Start: 2023-08-20 | End: 2023-08-20 | Stop reason: HOSPADM

## 2023-08-20 RX ORDER — HEPARIN SODIUM,PORCINE 10 UNIT/ML
VIAL (ML) INTRAVENOUS
Status: DISCONTINUED
Start: 2023-08-20 | End: 2023-08-20 | Stop reason: HOSPADM

## 2023-08-20 RX ORDER — HEPARIN SODIUM (PORCINE) LOCK FLUSH IV SOLN 100 UNIT/ML 100 UNIT/ML
5-10 SOLUTION INTRAVENOUS
Status: DISCONTINUED | OUTPATIENT
Start: 2023-08-20 | End: 2023-08-20 | Stop reason: HOSPADM

## 2023-08-20 RX ADMIN — HYDROMORPHONE HYDROCHLORIDE 0.5 MG: 1 INJECTION, SOLUTION INTRAMUSCULAR; INTRAVENOUS; SUBCUTANEOUS at 15:16

## 2023-08-20 RX ADMIN — HYDROXYUREA 3000 MG: 500 CAPSULE ORAL at 09:26

## 2023-08-20 RX ADMIN — ACETAMINOPHEN 975 MG: 325 TABLET, FILM COATED ORAL at 00:16

## 2023-08-20 RX ADMIN — HYDROMORPHONE HYDROCHLORIDE 1 MG: 1 INJECTION, SOLUTION INTRAMUSCULAR; INTRAVENOUS; SUBCUTANEOUS at 07:39

## 2023-08-20 RX ADMIN — ASPIRIN 81 MG CHEWABLE TABLET 81 MG: 81 TABLET CHEWABLE at 09:27

## 2023-08-20 RX ADMIN — HYDROMORPHONE HYDROCHLORIDE 1 MG: 1 INJECTION, SOLUTION INTRAMUSCULAR; INTRAVENOUS; SUBCUTANEOUS at 13:38

## 2023-08-20 RX ADMIN — HEPARIN, PORCINE (PF) 10 UNIT/ML INTRAVENOUS SYRINGE 5 ML: at 16:11

## 2023-08-20 RX ADMIN — ENOXAPARIN SODIUM 40 MG: 40 INJECTION SUBCUTANEOUS at 13:37

## 2023-08-20 RX ADMIN — CETIRIZINE HYDROCHLORIDE 10 MG: 10 TABLET, FILM COATED ORAL at 09:30

## 2023-08-20 RX ADMIN — KETOROLAC TROMETHAMINE 30 MG: 30 INJECTION, SOLUTION INTRAMUSCULAR; INTRAVENOUS at 09:21

## 2023-08-20 RX ADMIN — HYDROMORPHONE HYDROCHLORIDE 1 MG: 1 INJECTION, SOLUTION INTRAMUSCULAR; INTRAVENOUS; SUBCUTANEOUS at 03:22

## 2023-08-20 RX ADMIN — KETOROLAC TROMETHAMINE 30 MG: 30 INJECTION, SOLUTION INTRAMUSCULAR; INTRAVENOUS at 03:59

## 2023-08-20 RX ADMIN — AZITHROMYCIN MONOHYDRATE 250 MG: 250 TABLET ORAL at 09:27

## 2023-08-20 RX ADMIN — ACETAMINOPHEN 975 MG: 325 TABLET, FILM COATED ORAL at 09:27

## 2023-08-20 ASSESSMENT — ACTIVITIES OF DAILY LIVING (ADL)
ADLS_ACUITY_SCORE: 18

## 2023-08-20 NOTE — PROGRESS NOTES
Shift: 5500-2905     VS: /75 (BP Location: Left arm)   Pulse 87   Temp 98.2  F (36.8  C) (Oral)   Resp 16   SpO2 93%       Pain: Pain managed IV dilaudid 1mg q4hr and IV Toradol q6hr.   Neuro: A&Ox4; calm and cooperative. Denies n/t  Cardiac: On cardiac monitoring discontinued today. Denies chest pain  Respiratory: WDL. On RA. Denies sob. Reports no cough.   Diet/Appetite:  Regular diet; good appetite. Eating and drinking throughout shift.   LDA's: Port A Cath single left chest wall.   Activity: SBA; able to ambulate to bathroom on her own.   Pertinent Labs/: Hemoglobin 6.6 this morning.      Plan:  Discharge to home today.

## 2023-08-20 NOTE — PROGRESS NOTES
Brief Care Management Note:    Received page from charge RN, subsequently called her.  She stated pt is discharging today and needs a ride home.    Pt has [employer] insurance, she likely has transport benefits with [employer] ([phone]).    Called pt's mobile phone, introduced self and role, and inquired about transport.  Pt stated she needs a ride.  Provided pt w/ [employer] number and her subscriber ID, advised pt to call to schedule transport covered by insurance.  Pt stated she can do this.  Advised pt to alert bedside RN if there is an issue in accomplishing this, so that RNCC can be notified and assist further, she expressed understanding.    Received update from SW, they were contacted that pt attempted to schedule health plan ride but they are closed on weekends.    Called pt, verified discharge address.  Informed her that care mgmt can arrange cab ride, pt agreeable and appreciative for the assistance.    [facility] arranged [employer] ride for pt to home address in chart, then alerted bedside RN to pickup time/location: 4:00pm at [facility] main entrance.          Care management available as needed.    René Caruso, RNCC     Social Work and Care Management Department     SEARCHABLE in [facility] - search CARE COORDINATOR     [place] ([phone]) Saturday & Sunday; ([phone]) [facility] Recognized Holidays     Units: 5A, 5B & 5C  Pager: [phone]    Units: 6B, 6C & 6D    Pager: [phone]    Units: 7A, 7B, 7C & 7D    Pager: [phone]    Units: 6A & ICU   Pager: [phone]    Units: 5 Ortho, 5MS & WB ED Pager: [phone]    Units: 6MS, 8A & 10 ICU  Pager [phone]

## 2023-08-20 NOTE — DISCHARGE SUMMARY
[facility]  Hospitalist Discharge Summary      Date of Admission:  15-Aug-2023  Date of Discharge:   20-Aug-2023  Discharging Provider: Chirag Johnson MD  Discharge Service: Hospitalist Service, GOLD TEAM 16    Discharge Diagnoses   1.) Sickle cell pain crisis; patient has HbSS disease   2.) History of acute chest syndrome  3.) Acute on chronic anemia  4.) Fever  5.) Iron overload secondary to frequent transfusions.  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  Follow up with Hematology as scheduled.    Appointments on [place] and/or [facility] (with [facility] or [facility]   provider or service). Call [phone] if you haven't heard regarding   these appointments within 7 days of discharge.            Unresulted Labs Ordered in the Past 30 Days of this Admission       Date and Time Order Name Status Description    8/17/2023  5:01 AM Blood Culture Hand, Right Preliminary     8/17/2023  5:01 AM Blood Culture Hand, Left Preliminary             Discharge Disposition   - Patient was discharged to home in good condition.    Hospital Course   Patient is a 24 y/o woman who has a history of HbSS disease complicated by past acute chest syndrome (last in Sep-2022), past CVA and iron overload secondary to frequent transfusions. Patient presented on 15-Aug-2023 with a sickle cell pain crisis. Patient was febrile on 17-Aug-2023 but not on subsequent days.    1.) Sickle cell pain crisis; patient has HbSS disease and has had acute chest syndrome in the past:  - Patient was seen by Hematology during hospital.  - Patient had no evidence of acute chest syndrome during this hospital stay.  - Patient was on IV  hydromorphone, IV ketorolac and PO oxycodone for pain control.  - Patient was continued on hydroxyurea.  - Pain became controlled and patient was discharged to home on 20-Aug-2023.    2.) Acute on chronic anemia:  - Patient was transfused 2 units on 18-Aug-2023 for a hemoglobin of 4.4.    3.) Fever of unclear cause:  - Patient had fever on 17-Aug-2023. Patient did not have fever on subsequent days. Patient had no other signs or symptoms of infection.  - Patient was on azithromycin during hospital stay but was not discharged on azithromycin per Hematology recommendations.  - Blood cultures were drawn and remained negative on the day of discharge.    4.) Iron overload secondary to frequent transfusions:   - Patient was continued on deferasirox during hospital stay.    5.) History of CVA:  - Patient was continued on aspirin during hospital stay.    6.) Moderate persistent asthma:  - This was compensated during hospital stay.    7.) Depression:   - Patient was continued of fluoxetine during hospital stay.    8.)  Atelectasis:  - Incentive spirometer was ordered.    9.) History of recurrent VTE/PE:  - Patient not on anticoagulation.      Consultations This Hospital Stay   HEMATOLOGY ADULT IP CONSULT  HEMATOLOGY ADULT IP CONSULT    Code Status   Full Code        Chirag Johnson MD  [facility] MED SURG  [facility] [address]  Phone: [phone]  Fax: [phone]  ______________________________________________________________________    Physical Exam   Vital Signs: Temp: 98.2  F (36.8  C) Temp src: Oral BP: 128/75 Pulse: 87   Resp: 16 SpO2: 93 % O2 Device: None (Room air)      General: Patient comfortable, NAD.  Heart: RRR, S1 S2 w/o murmurs.  Lungs: Breath sounds present. No crackles/wheezes heard.  Abdomen: Soft, nontender.          Primary Care Physician   Katherine Pierce    Discharge Orders      Reason for your hospital stay    Sickle cell pain crisis     Activity    Your activity  upon discharge: As tolerated.     Adult [facility] Follow-up and recommended labs and tests    Follow up with PCP in 1 week.  Follow up with Hematology as scheduled.    Appointments on [place] and/or [facility] (with [facility] or [facility] provider or service). Call [phone] if you haven't heard regarding these appointments within 7 days of discharge.     Diet    Follow this diet upon discharge: Regular diet.       Significant Results and Procedures   - None    Discharge Medications   Current Discharge Medication List        CONTINUE these medications which have NOT CHANGED    Details   acetaminophen (TYLENOL) 325 MG tablet Take 2 tablets (650 mg) by mouth every 6 hours as needed for mild pain  Qty: 120 tablet, Refills: 3    Associated Diagnoses: Sickle cell crisis (H)      albuterol (PROAIR HFA/PROVENTIL HFA/VENTOLIN HFA) 108 (90 Base) MCG/ACT inhaler Inhale 2 puffs into the lungs every 6 hours as needed for shortness of breath or wheezing  Qty: 8.5 g, Refills: 3    Comments: Pharmacy may dispense brand covered by insurance (Proair, or proventil or ventolin or generic albuterol inhaler)  Associated Diagnoses: Asthmatic bronchitis without complication, unspecified asthma severity, unspecified whether persistent      albuterol (PROVENTIL) (2.5 MG/3ML) 0.083% neb solution Take 2 vials (5 mg) by nebulization every 6 hours as needed for shortness of breath or wheezing  Qty: 90 mL, Refills: 3    Associated Diagnoses: Asthmatic bronchitis without complication, unspecified asthma severity, unspecified whether persistent      aspirin (ASA) 81 MG chewable tablet Take 1 tablet (81 mg) by mouth 2 times daily  Qty: 60 tablet, Refills: 11    Associated Diagnoses: Superficial venous thrombosis of arm, right      budesonide-formoterol (SYMBICORT) 160-4.5 MCG/ACT Inhaler Inhale 2 puffs into the lungs 2 times daily  Qty: 10.2 g, Refills: 3    Associated Diagnoses: Asthmatic bronchitis without complication, unspecified asthma  severity, unspecified whether persistent      cetirizine (ZYRTEC) 10 MG tablet Take 1 tablet (10 mg) by mouth daily  Qty: 30 tablet, Refills: 1    Associated Diagnoses: Seasonal allergies      deferasirox (JADENU) 360 MG tablet Take 4 tablets (1,440 mg) by mouth every evening  Qty: 120 tablet, Refills: 4    Associated Diagnoses: Iron overload due to repeated red blood cell transfusions      diphenhydrAMINE (BENADRYL) 25 MG capsule Take 1 capsule (25 mg) by mouth every 6 hours as needed for itching or allergies  Qty: 30 capsule, Refills: 0    Associated Diagnoses: Seasonal allergies      Hydroxyurea 1000 MG TABS Take 3,000 mg by mouth daily  Qty: 90 tablet, Refills: 3    Associated Diagnoses: 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)      ondansetron (ZOFRAN) 8 MG tablet Take 1 tablet (8 mg) by mouth every 8 hours as needed  Qty: 30 tablet, Refills: 1    Associated Diagnoses: Other acute gastritis without hemorrhage      oxyCODONE IR (ROXICODONE) 15 MG tablet Take 1 tablet (15 mg) by mouth every 4 hours as needed for severe pain Goal 4 per day. Max 6 per day.  Qty: 15 tablet, Refills: 0    Associated Diagnoses: Sickle cell pain crisis (H)      EPINEPHrine (ANY BX GENERIC EQUIV) 0.3 MG/0.3ML injection 2-pack Inject 0.3 mLs (0.3 mg) into the muscle as needed for anaphylaxis  Qty: 1 each, Refills: 1    Associated Diagnoses: Anaphylaxis, sequela      naloxone (NARCAN) 4 MG/0.1ML nasal spray Spray 4 mg into one nostril alternating nostrils as needed for opioid reversal every 2-3 minutes until assistance arrives           Allergies   Allergies   Allergen Reactions    Contrast Dye      Hives and breathing issues    Fish-Derived Products Hives    Seafood Hives    Gadolinium     Iodinated Contrast Media

## 2023-08-20 NOTE — PROGRESS NOTES
Shift: 9792-6572     VS: BP 97/66 (BP Location: Right arm)   Pulse 92   Temp 98.9  F (37.2  C) (Oral)   Resp 18   SpO2 97%       Pain: Pain managed IV dilaudid 1mg q4hr and IV Toradol q6hr.   Neuro: A&Ox4; calm and cooperative. Denies n/t  Cardiac: On cardiac monitoring for sickle cell crisis. Denies chest pain  Respiratory: WDL. On RA. Denies sob. Reports no cough.   Diet/Appetite:  Regular diet; good appetite. Eating and drinking throughout shift.   LDA's: Port A Cath single left chest wall.   Activity: SBA; able to ambulate to bathroom on her own.   Pertinent Labs/: Hemoglobin 7.0 this morning.     Plan:  TBD. Call light within reach; able to make needs known.

## 2023-08-20 NOTE — DISCHARGE SUMMARY
6MS DISCHARGE    D: Patient discharged to home at 1540.     I: No changes were made to patient's medications. All discharge medications and instructions reviewed with patient. Other phone numbers to call with questions or concerns after discharge reviewed.  Port A cath de-accessed. Education completed.    A: Patient verbalized understanding of discharge medications and instructions.   Belongings returned to patient.    P: Patient to follow-up with PCP in 1 week.

## 2023-08-20 NOTE — PROGRESS NOTES
[facility]    Medicine Progress Note - Hospitalist Service, GOLD TEAM 16    Date of Admission:  8/15/2023    Assessment & Plan   A: Patient is a 22 y/o woman who has a history of HbSS disease complicated by past acute chest syndrome (last in Sep-2022), past CVA and iron overload secondary to frequent transfusions. Patient presented on 15-Aug-2023 with a sickle cell pain crisis. Patient was febrile on 17-Aug-2023. Cause of fever is currently unclear. Fever has not recurred.     Patient had a hemoglobin of 4.4 that increased to 7.0 following transfusion. Cause of acute anemia is unclear.     Patient had pain in ribs radiating to her back and bilateral shoulder pain. Pain in ribs and in right shoulder has resolved but patient had reported continued pain following her left clavicle yesterday. This potentially could still be from sickle cell pain crisis. Pain crisis appears to be resolving.     P:  1.) Sickle cell pain crisis; patient has HbSS disease and has had acute chest syndrome in the past:  - Per Hematology recommendations, checking CBC with differential and reticulocyte count daily.  - IV hydromorphone, IV ketorolac and PO oxycodone as needed.  - Patient on hydroxyurea.     2.) Acute on chronic anemia:  - Patient was transfused on 18-Aug-2023.  - Monitoring blood counts. Hb was 6.6 today - will not transfuse patient.  - Avoiding transfusion per Hematology recommendations - will confer with Hematology before transfusions.     3.) Fever of unclear cause; blood cultures negative to date:  - Awaiting blood culture results.  - Per Hematology recommendations, will stop azithromycin at time of discharge.     4.) Iron overload secondary to frequent transfusions:  - Patient on deferasirox.     5.) History of CVA:  - Patient on aspirin.     6.) Moderate persistent asthma, compensated:  - Patient on maintenance inhaler. Albuterol as needed.  - Monitoring for changes.     7.)  Depression:  - Patient on fluoxetine.     8.) Atelectasis:  - Incentive spirometer.     9.) History of recurrent VTE/PE:  - Patient not on anticoagulation.       Diet: Regular Diet Adult  Diet    Lopez Catheter: Not present  Lines: PRESENT      Port A Cath Single 04/21/21 Left Chest wall-Site Assessment: WDL      Cardiac Monitoring: None  Code Status: Full Code      Clinically Significant Risk Factors                             # Asthma: noted on problem list        Disposition Plan      Expected Discharge Date: 08/20/2023,  3:00 PM                Chirag Johnson MD  Hospitalist Service, GOLD TEAM [address] [place] [address]  Securely message with [employer] (more info)  Text page via [employer] Paging/Directory   See signed in provider for up to date coverage information  ______________________________________________________________________    Interval History   Patient noted pain improved. Patient noted no fever and noted no new problems.    Physical Exam   Vital Signs: Temp: 98.2  F (36.8  C) Temp src: Oral BP: 128/75 Pulse: 87   Resp: 16 SpO2: 93 % O2 Device: None (Room air)      General: Patient comfortable, NAD.  Heart: RRR, S1 S2 w/o murmurs.  Lungs: Breath sounds present. No crackles/wheezes heard.  Abdomen: Soft, nontender.    Labs noted.  WBC 7.4; Hb 6.6; Platelets 350    Medical Decision Making

## 2023-08-20 NOTE — PROGRESS NOTES
Pt alert and oriented x 4, calm and cooperative, and on cardiac monitoring. Denies chest pain, numbness and tingling. Pt is on contact precaution for MARSA. Pt reported the last BM was yesterday and the scheduled senna given. Pt have port A cath single on left chest wall. Scheduled tylenol and PRN dilaudid for pain management.     Blood pressure 102/61, pulse 80, temperature 98.6  F (37  C), temperature source Tympanic, resp. rate 16, SpO2 94 %, not currently breastfeeding.

## 2023-08-21 ENCOUNTER — THERAPY VISIT (OUTPATIENT)
Dept: OCCUPATIONAL THERAPY | Facility: CLINIC | Age: 23
End: 2023-08-21
Payer: COMMERCIAL

## 2023-08-21 ENCOUNTER — PATIENT OUTREACH (OUTPATIENT)
Dept: ONCOLOGY | Facility: CLINIC | Age: 23
End: 2023-08-21

## 2023-08-21 ENCOUNTER — INFUSION THERAPY VISIT (OUTPATIENT)
Dept: INFUSION THERAPY | Facility: CLINIC | Age: 23
End: 2023-08-21
Attending: REGISTERED NURSE
Payer: COMMERCIAL

## 2023-08-21 ENCOUNTER — NURSE TRIAGE (OUTPATIENT)
Dept: ONCOLOGY | Facility: CLINIC | Age: 23
End: 2023-08-21

## 2023-08-21 ENCOUNTER — PATIENT OUTREACH (OUTPATIENT)
Dept: CARE COORDINATION | Facility: CLINIC | Age: 23
End: 2023-08-21

## 2023-08-21 VITALS
TEMPERATURE: 98.2 F | OXYGEN SATURATION: 96 % | SYSTOLIC BLOOD PRESSURE: 109 MMHG | DIASTOLIC BLOOD PRESSURE: 69 MMHG | HEART RATE: 88 BPM

## 2023-08-21 DIAGNOSIS — G81.10 SPASTIC HEMIPLEGIA, UNSPECIFIED ETIOLOGY, UNSPECIFIED LATERALITY (H): ICD-10-CM

## 2023-08-21 DIAGNOSIS — D57.00 SICKLE CELL PAIN CRISIS (H): ICD-10-CM

## 2023-08-21 DIAGNOSIS — R25.2 SPASTICITY: ICD-10-CM

## 2023-08-21 DIAGNOSIS — D57.00 SICKLE CELL PAIN CRISIS (H): Primary | ICD-10-CM

## 2023-08-21 DIAGNOSIS — I69.351 HEMIPLEGIA AND HEMIPARESIS FOLLOWING CEREBRAL INFARCTION AFFECTING RIGHT DOMINANT SIDE (H): Primary | ICD-10-CM

## 2023-08-21 PROCEDURE — 96361 HYDRATE IV INFUSION ADD-ON: CPT

## 2023-08-21 PROCEDURE — 96376 TX/PRO/DX INJ SAME DRUG ADON: CPT

## 2023-08-21 PROCEDURE — 97140 MANUAL THERAPY 1/> REGIONS: CPT | Mod: GO

## 2023-08-21 PROCEDURE — 250N000013 HC RX MED GY IP 250 OP 250 PS 637: Performed by: PEDIATRICS

## 2023-08-21 PROCEDURE — 96374 THER/PROPH/DIAG INJ IV PUSH: CPT

## 2023-08-21 PROCEDURE — 97763 ORTHC/PROSTC MGMT SBSQ ENC: CPT | Mod: GO

## 2023-08-21 PROCEDURE — 258N000003 HC RX IP 258 OP 636: Performed by: PEDIATRICS

## 2023-08-21 PROCEDURE — 250N000011 HC RX IP 250 OP 636: Performed by: PEDIATRICS

## 2023-08-21 RX ORDER — HEPARIN SODIUM,PORCINE 10 UNIT/ML
5 VIAL (ML) INTRAVENOUS
Status: CANCELLED | OUTPATIENT
Start: 2023-10-01

## 2023-08-21 RX ORDER — ONDANSETRON 4 MG/1
8 TABLET, ORALLY DISINTEGRATING ORAL
Status: COMPLETED | OUTPATIENT
Start: 2023-08-21 | End: 2023-08-21

## 2023-08-21 RX ORDER — ONDANSETRON 4 MG/1
8 TABLET, FILM COATED ORAL
Status: CANCELLED
Start: 2023-10-01

## 2023-08-21 RX ORDER — DIPHENHYDRAMINE HCL 25 MG
25 CAPSULE ORAL
Status: CANCELLED
Start: 2023-10-01

## 2023-08-21 RX ORDER — DIPHENHYDRAMINE HCL 25 MG
25 CAPSULE ORAL
Status: COMPLETED | OUTPATIENT
Start: 2023-08-21 | End: 2023-08-21

## 2023-08-21 RX ORDER — HEPARIN SODIUM (PORCINE) LOCK FLUSH IV SOLN 100 UNIT/ML 100 UNIT/ML
5 SOLUTION INTRAVENOUS
Status: DISCONTINUED | OUTPATIENT
Start: 2023-08-21 | End: 2023-08-21 | Stop reason: HOSPADM

## 2023-08-21 RX ORDER — HEPARIN SODIUM (PORCINE) LOCK FLUSH IV SOLN 100 UNIT/ML 100 UNIT/ML
5 SOLUTION INTRAVENOUS
Status: CANCELLED | OUTPATIENT
Start: 2023-10-01

## 2023-08-21 RX ORDER — OXYCODONE HYDROCHLORIDE 15 MG/1
15 TABLET ORAL EVERY 4 HOURS PRN
Qty: 15 TABLET | Refills: 0 | Status: SHIPPED | OUTPATIENT
Start: 2023-08-21 | End: 2023-08-28

## 2023-08-21 RX ADMIN — HYDROMORPHONE HYDROCHLORIDE 1 MG: 1 INJECTION, SOLUTION INTRAMUSCULAR; INTRAVENOUS; SUBCUTANEOUS at 08:49

## 2023-08-21 RX ADMIN — SODIUM CHLORIDE, POTASSIUM CHLORIDE, SODIUM LACTATE AND CALCIUM CHLORIDE 1000 ML: 600; 310; 30; 20 INJECTION, SOLUTION INTRAVENOUS at 08:49

## 2023-08-21 RX ADMIN — DIPHENHYDRAMINE HYDROCHLORIDE 25 MG: 25 CAPSULE ORAL at 08:49

## 2023-08-21 RX ADMIN — HYDROMORPHONE HYDROCHLORIDE 1 MG: 1 INJECTION, SOLUTION INTRAMUSCULAR; INTRAVENOUS; SUBCUTANEOUS at 09:55

## 2023-08-21 RX ADMIN — ONDANSETRON 8 MG: 8 TABLET, ORALLY DISINTEGRATING ORAL at 08:49

## 2023-08-21 RX ADMIN — HYDROMORPHONE HYDROCHLORIDE 1 MG: 1 INJECTION, SOLUTION INTRAMUSCULAR; INTRAVENOUS; SUBCUTANEOUS at 10:54

## 2023-08-21 NOTE — PROGRESS NOTES
Hand Therapy Progress Note 8/21/23 08/21/23 0500   Appointment Info   Treating Provider Ally Longoria OTR/L   Total/Authorized Visits 8 +6 (POC)   Visits Used 10   Medical Diagnosis R hemiplegia s/p R flexor pronator release, brachioradialis lengthening, biceps tenotomy and thenar release   OT Tx Diagnosis R wrist and elbow stiffness   Quick Add  Certification   Progress Note/Certification   Start Of Care Date 03/03/23   Onset of Illness/Injury or Date of Surgery 10/02/19  (date of last sx)   Therapy Frequency 1 addl week   Predicted Duration 1 week   Certification date from 08/21/23   Certification date to 08/28/23   Progress Note Due Date 08/28/23   Progress Note Completed Date 08/21/23   Goals   OT Goals 2   OT Goal 1   Goal Description Pt will demo R elbow ext lag of 25* or less for improved comfort and positioning during IADLs.   Goal Progress passive motion exceeds goal following tx   Target Date 08/21/23   Date Met 08/21/23   OT Goal 2   Goal Description Pt will demo R wrist ext lag of 55* or less for improved comfort and positioning during IADLs..   Goal Progress Passive motion exceeds goal following tx   Target Date 08/21/23   Date Met 08/21/23   Subjective Report   Subjective Report Pt reports resting hand splint no longer provides much stretch to the wrist. Elbow ext splint also feels a little less tight.   Objective Measures   Objective Measures Objective Measure 1;Objective Measure 2   Objective Measure 1   Objective Measure Elbow extension PROM   Details -28*   Objective Measure 2   Objective Measure Wrist extension PROM   Details -23*   Treatment Interventions (OT)   Interventions Manual Therapy;Orthotics   Manual Therapy   Manual Therapy Minutes (05293) 16   Manual Therapy Manual Therapy 2;Manual Therapy 3;Manual Therapy 4   Manual Therapy 1 PROM w/ prolonged hold   Manual Therapy 1 - Details Digit ext, thumb abd, wrist ext   Manual Therapy 3 PROM   Manual Therapy 3 - Details Wrist Ext; digit  ext, thumb abd, elbow ext   Skilled Intervention manual techniques   Patient Response/Progress tolerates well; improved PROM at end of tx   Orthotics   Orthotic Management, Subsequent session minutes (64835) 25 Minutes   Type of Orthotic Static progressive resting hand splint; static progressive elbow ext splint   Wear Schedule daytime   Skilled Intervention Progressed elbow and resting splints for increased stretch   Patient Response/Progress Tolerates well   Plan   Home program Cont HEP and brace wearing   Plan for next session D/C today   Total Session Time   Timed Code Treatment Minutes 41   Total Treatment Time (sum of timed and untimed services) 41         DISCHARGE  Reason for Discharge: Pt has met goals and maximized benefit of hand therapy at this time. She may benefit from returning to continue work on PROM and positioning for optimal comfort and function of R UE.     Equipment Issued: Custom splints    Discharge Plan: Patient to continue home program.    Referring Provider:  Anai Franco      [facility]                                                                                   OUTPATIENT OCCUPATIONAL THERAPY    PLAN OF TREATMENT FOR OUTPATIENT REHABILITATION   Patient's Last Name, First Name, PALAKTavoPATYTavo  BillyJennifer YOB: 2000   Provider's Name   [facility]   Medical Record No.  [id number]     Onset Date: 10/02/19 (date of last sx) Start of Care Date: 03/03/23     Medical Diagnosis:  R hemiplegia s/p R flexor pronator release, brachioradialis lengthening, biceps tenotomy and thenar release      OT Treatment Diagnosis:  R wrist and elbow stiffness Plan of Treatment  Frequency/Duration:1 addl week/1 week    Certification date from 08/21/23   To 08/28/23        See note for plan of treatment details and functional goals     ROSANA Cheek                         I CERTIFY THE NEED FOR THESE SERVICES FURNISHED UNDER         THIS PLAN OF TREATMENT AND WHILE UNDER MY CARE     (Physician attestation of this document indicates review and certification of the therapy plan).                Referring Provider:  Anai Franco      Initial Assessment  See Epic Evaluation- 03/03/23

## 2023-08-21 NOTE — TELEPHONE ENCOUNTER
Narcotic Refill Request  Medication(s) requested:  Oxycodone 15mg tab  Person Requesting Refill: Jennifer  What pain is the medication treating: sickle cell pain  How is the medication being taken?: 1 tab q4h PRN  Does pt have enough for today? Will be out this afternoon  Is pain being adequately controlled on the current regimen?: yes  Experiencing any side effects from medication?: no    Date of most recent appointment:  8/11/23  Any No Show Visits: none  Next appointment:   9/8/23   Last fill date and by whom:  8/14/23 by Patricia Mantilla   Reviewed:  no access    Send to provider: Patricia Mantilla

## 2023-08-21 NOTE — PROGRESS NOTES
[facility]    Background: Transitional Care Management program identified per system criteria and reviewed by [facility] Resource [place] team for possible outreach.    Assessment: Upon chart review, CCR Team member will not proceed with patient outreach related to this episode of Transitional Care Management program due to reason below:    Patient has active communication with a nurse, provider or care team for reason of post-hospital follow up plan.  Outreach call by CCRC team not indicated to minimize duplicative efforts.     Pt talked with:Oncology Nurse Triage & Pt had appt. Today as Therapy Visit.      Plan: Transitional Care Management episode addressed appropriately per reason noted above.      KHALIDA Calvin  [facility]    *Connected Care Resource Team does NOT follow patient ongoing. Referrals are identified based on internal discharge reports and the outreach is to ensure patient has an understanding of their discharge instructions.

## 2023-08-21 NOTE — PROGRESS NOTES
Nursing Note  Jennifer Cervantes presents today to [facility] for:   Chief Complaint   Patient presents with    Infusion     Pain meds, fluids     During today's [facility] appointment, orders from Dr. Duncan were completed.  Frequency: as needed    Progress note:  Patient identification verified by name and date of birth.  Assessment completed.  Vitals recorded in Doc Flowsheets.  Patient was provided with education regarding medication/procedure and possible side effects.  Patient verbalized understanding.     present during visit today: Not Applicable.    Treatment Conditions: Non-applicable.    Premedications: administered per order.    Drug Waste Record: No    Infusion length and rate:  infusion given over approximately  2 hours  500 ml/hr.    Labs: were not ordered for this appointment.    Vascular access: port accessed today.    Is the next appt scheduled? As needed    Post Infusion Assessment:  Patient tolerated infusion without incident.     Discharge Plan:   Follow up plan of care with: primary care provider,  Discharge instructions were reviewed with patient.  Patient/representative verbalized understanding of discharge instructions and all questions answered.  Patient discharged from [facility] in stable condition.    Tati Iglesias RN    Administrations This Visit       diphenhydrAMINE (BENADRYL) capsule 25 mg       Admin Date  08/21/2023 Action  $Given Dose  25 mg Route  Oral Administered By  Tati Iglesias RN              HYDROmorphone (DILAUDID) injection 1 mg       Admin Date  08/21/2023 Action  $Given Dose  1 mg Route  Intravenous Administered By  Tati Iglesias RN               Admin Date  08/21/2023 Action  $Given Dose  1 mg Route  Intravenous Administered By  Tati Iglesias RN              lactated ringers BOLUS 1,000 mL       Admin Date  08/21/2023 Action  $New Bag Dose  1,000 mL Rate  500 mL/hr  Route  Intravenous Administered By  Tati Iglesias, RN              ondansetron (ZOFRAN ODT) ODT tab 8 mg       Admin Date  08/21/2023 Action  $Given Dose  8 mg Route  Oral Administered By  Tati Iglesias, RN                    /83   Pulse 88   Temp 98.2  F (36.8  C) (Oral)   SpO2 96%

## 2023-08-21 NOTE — PROGRESS NOTES
[facility]: Cancer Care                                                                                          Background: Transitional Care Management program identified per system criteria and reviewed by RN Care Coordinator for possible outreach.     Assessment: Upon chart review, RNCC Team member will not proceed with patient outreach related to this episode of Transitional Care Management program due to reason below:     Patient has a follow up appointment with infusion on 8/21/23     Plan: Transitional Care Management episode addressed appropriately per reason noted above.         Signature:  Arely Krueger RN

## 2023-08-21 NOTE — TELEPHONE ENCOUNTER
Oncology Nurse Triage - Sickle Cell Pain Crisis:  Situation: Jennifer  calling about Sickle Cell Pain Crisis, requesting to be added on for IV fluids and pain medicine    Background:   Patient's last infusion was hospitalized 8/15-8/20/23 for sickle cell pain crisis- okayed by Patricia Mantilla to come in today.   Last clinic visit date:8/11/23   Does patient have active treatment plan?  Yes    Assessment of Symptoms:  Onset/Duration of symptoms: 2 day    Is it typical sickle cell pain? Yes   Location: L shoulder, L side  Character: Sharp           Intensity: 8/10    Any radiation of pain, numbness, tingling, weakness, warmth, swelling, discoloration of arms or legs?No     Fever?No    Chest Pain Present: No     Shortness of breath: No     Other home therapies tried: HEAT/HEATING PAD     Last home medication taken and when: Oyxocodone around 6am    Any Refills Needed?: Yes     Does patient have transportation & length of time to get to clinic: No - pt currently at [facility] for appt    Recommendations:   0715 message sent to Patricia Mantilla  0801 call to pt to offer 0830 appt in [facility] infusion per Patricia, charge nurse. Pt accepting. Message sent to scheduling at 0803.    If you do not hear from the infusion center by 2pm then you will not be able to get in for an infusion today. If symptoms worsen while waiting for call back, and/or you experience fever, chills, SOB, chest pain, cough, n/v, dizziness, numbness, swelling, discoloration of extremities, then seek emergency evaluation in Emergency Department.     Please note, if you are late for your appt, you risk losing your infusion appt as it may delay another patient's infusion who arrived on time.

## 2023-08-22 LAB
BACTERIA BLD CULT: NO GROWTH
BACTERIA BLD CULT: NO GROWTH

## 2023-08-23 ENCOUNTER — NURSE TRIAGE (OUTPATIENT)
Dept: ONCOLOGY | Facility: CLINIC | Age: 23
End: 2023-08-23
Payer: COMMERCIAL

## 2023-08-23 ENCOUNTER — PATIENT OUTREACH (OUTPATIENT)
Dept: CARE COORDINATION | Facility: CLINIC | Age: 23
End: 2023-08-23
Payer: COMMERCIAL

## 2023-08-23 ENCOUNTER — INFUSION THERAPY VISIT (OUTPATIENT)
Dept: INFUSION THERAPY | Facility: CLINIC | Age: 23
End: 2023-08-23
Attending: PEDIATRICS
Payer: COMMERCIAL

## 2023-08-23 VITALS
SYSTOLIC BLOOD PRESSURE: 119 MMHG | RESPIRATION RATE: 16 BRPM | DIASTOLIC BLOOD PRESSURE: 80 MMHG | TEMPERATURE: 98.3 F | HEART RATE: 89 BPM | OXYGEN SATURATION: 100 %

## 2023-08-23 DIAGNOSIS — D57.00 SICKLE CELL PAIN CRISIS (H): Primary | ICD-10-CM

## 2023-08-23 DIAGNOSIS — G81.10 SPASTIC HEMIPLEGIA, UNSPECIFIED ETIOLOGY, UNSPECIFIED LATERALITY (H): ICD-10-CM

## 2023-08-23 PROCEDURE — 258N000003 HC RX IP 258 OP 636: Performed by: PEDIATRICS

## 2023-08-23 PROCEDURE — 96361 HYDRATE IV INFUSION ADD-ON: CPT

## 2023-08-23 PROCEDURE — 250N000013 HC RX MED GY IP 250 OP 250 PS 637: Performed by: PEDIATRICS

## 2023-08-23 PROCEDURE — 250N000011 HC RX IP 250 OP 636: Mod: JZ | Performed by: PEDIATRICS

## 2023-08-23 PROCEDURE — 96374 THER/PROPH/DIAG INJ IV PUSH: CPT

## 2023-08-23 PROCEDURE — 96376 TX/PRO/DX INJ SAME DRUG ADON: CPT

## 2023-08-23 RX ORDER — DIPHENHYDRAMINE HCL 25 MG
25 CAPSULE ORAL
Status: COMPLETED | OUTPATIENT
Start: 2023-08-23 | End: 2023-08-23

## 2023-08-23 RX ORDER — DIPHENHYDRAMINE HCL 25 MG
25 CAPSULE ORAL
Status: CANCELLED
Start: 2023-10-01

## 2023-08-23 RX ORDER — HEPARIN SODIUM (PORCINE) LOCK FLUSH IV SOLN 100 UNIT/ML 100 UNIT/ML
5 SOLUTION INTRAVENOUS
Status: CANCELLED | OUTPATIENT
Start: 2023-10-01

## 2023-08-23 RX ORDER — HEPARIN SODIUM,PORCINE 10 UNIT/ML
5 VIAL (ML) INTRAVENOUS
Status: CANCELLED | OUTPATIENT
Start: 2023-10-01

## 2023-08-23 RX ORDER — ONDANSETRON 4 MG/1
8 TABLET, FILM COATED ORAL
Status: CANCELLED
Start: 2023-10-01

## 2023-08-23 RX ORDER — HEPARIN SODIUM (PORCINE) LOCK FLUSH IV SOLN 100 UNIT/ML 100 UNIT/ML
5 SOLUTION INTRAVENOUS
Status: DISCONTINUED | OUTPATIENT
Start: 2023-08-23 | End: 2023-08-23 | Stop reason: HOSPADM

## 2023-08-23 RX ORDER — ONDANSETRON 4 MG/1
8 TABLET, ORALLY DISINTEGRATING ORAL
Status: COMPLETED | OUTPATIENT
Start: 2023-08-23 | End: 2023-08-23

## 2023-08-23 RX ADMIN — Medication 5 ML: at 16:41

## 2023-08-23 RX ADMIN — HYDROMORPHONE HYDROCHLORIDE 1 MG: 1 INJECTION, SOLUTION INTRAMUSCULAR; INTRAVENOUS; SUBCUTANEOUS at 16:38

## 2023-08-23 RX ADMIN — HYDROMORPHONE HYDROCHLORIDE 1 MG: 1 INJECTION, SOLUTION INTRAMUSCULAR; INTRAVENOUS; SUBCUTANEOUS at 14:34

## 2023-08-23 RX ADMIN — ONDANSETRON 8 MG: 4 TABLET, ORALLY DISINTEGRATING ORAL at 14:44

## 2023-08-23 RX ADMIN — HYDROMORPHONE HYDROCHLORIDE 1 MG: 1 INJECTION, SOLUTION INTRAMUSCULAR; INTRAVENOUS; SUBCUTANEOUS at 15:37

## 2023-08-23 RX ADMIN — SODIUM CHLORIDE, POTASSIUM CHLORIDE, SODIUM LACTATE AND CALCIUM CHLORIDE 1000 ML: 600; 310; 30; 20 INJECTION, SOLUTION INTRAVENOUS at 14:30

## 2023-08-23 RX ADMIN — DIPHENHYDRAMINE HYDROCHLORIDE 25 MG: 25 CAPSULE ORAL at 14:44

## 2023-08-23 NOTE — TELEPHONE ENCOUNTER
Oncology Nurse Triage - Sickle Cell Pain Crisis:    Situation: Jennifer  calling about Sickle Cell Pain Crisis, requesting to be added on for IV fluids and pain medicine    Background:     Patient's last infusion was 8/21/23  Last clinic visit date:8/11/23   Does patient have active treatment plan?  Yes      Assessment of Symptoms:  Onset/Duration of symptoms: 1 day    Is it typical sickle cell pain? Yes   Location: left side and lower back   Character: Sharp           Intensity: 9/10    Any radiation of pain, numbness, tingling, weakness, warmth, swelling, discoloration of arms or legs?No     Fever?No  (if yes max temperature recorded in last 24 hours):      Chest Pain Present: No     Shortness of breath: No     Other home therapies tried: HEAT/HEATING PAD and WARM BATH     Last home medication taken and when: 6:00am Oxycodone     Any Refills Needed?: No     Does patient have transportation & length of time to get to clinic: No needs transportation to clinic         Recommendations:     Placed on infusion call list     If you do not hear from the infusion center by 2pm then you will not be able to get in for an infusion today. If symptoms worsen while waiting for call back, and/or you experience fever, chills, SOB, chest pain, cough, n/v, dizziness, numbness, swelling, discoloration of extremities, then seek emergency evaluation in Emergency Department.     Please note, if you are late for your appt, you risk losing your infusion appt as it may delay another patient's infusion who arrived on time.

## 2023-08-23 NOTE — PATIENT INSTRUCTIONS
Dear Jennifer Cervantes    Thank you for choosing [facility] ([facility]) for your infusion.  The following information is a summary of our appointment as well as important reminders.          If you are a transplant patient and require transplant education, please click on this link: [email or web address].    We look forward in seeing you on your next appointment here at [facility] ([facility]).  Please don t hesitate to call us at [phone] to reschedule any of your appointments or to speak with one of the [facility] registered nurses.  It was a pleasure taking care of you today.    Sincerely,    [facility]  [facility]  [address] [place] [address]  [id number]  Phone:  [phone]

## 2023-08-23 NOTE — PROGRESS NOTES
Infusion Nursing Note:  Jennifer Cervantes presents today for IVF, pain meds, anti emetics, anti allergy.    Patient seen by provider today: No   present during visit today: Not Applicable.    Note: As ordered in the therapy plan, below treatment administered:  Administrations This Visit       diphenhydrAMINE (BENADRYL) capsule 25 mg       Admin Date  08/23/2023 Action  $Given Dose  25 mg Route  Oral Administered By  Lien Hathaway RN              heparin 100 unit/mL injection 5 mL       Admin Date  08/23/2023 Action  $Given Dose  5 mL Route  Intracatheter Administered By  Lien Hathaway RN              HYDROmorphone (DILAUDID) injection 1 mg       Admin Date  08/23/2023 Action  $Given Dose  1 mg Route  Intravenous Administered By  Lien Hathaway RN               Admin Date  08/23/2023 Action  $Given Dose  1 mg Route  Intravenous Administered By  Lien Hathaway RN               Admin Date  08/23/2023 Action  $Given Dose  1 mg Route  Intravenous Administered By  Lien Hathaway RN              lactated ringers BOLUS 1,000 mL       Admin Date  08/23/2023 Action  $New Bag Dose  1,000 mL Rate  500 mL/hr Route  Intravenous Administered By  Lien Hathaway RN              ondansetron (ZOFRAN ODT) ODT tab 8 mg       Admin Date  08/23/2023 Action  $Given Dose  8 mg Route  Oral Administered By  Lien Hathaway RN                     Intravenous Access:  Implanted Port.    Treatment Conditions:  None.      Post Infusion Assessment:  Patient tolerated infusion without incident.  Blood return noted pre and post infusion.  Site patent and intact, free from redness, edema or discomfort.  No evidence of extravasations.       Discharge Plan:   Discharge instructions reviewed with: Patient.  Patient and/or family verbalized understanding of discharge instructions and all questions answered.  AVS to patient via [employer].  Patient will return as needed for next appointment.   Patient discharged in stable condition accompanied by: self.  Departure Mode:  Ambulatory.    /80 (BP Location: Right arm, Patient Position: Semi-Short's, Cuff Size: Adult Regular)   Pulse 89   Temp 98.3  F (36.8  C) (Oral)   Resp 16   SpO2 100%      Lien Hathaway RN

## 2023-08-23 NOTE — TELEPHONE ENCOUNTER
[facility] had cancel at 2:00pm     Call placed to Jennifer and she will take that appointment   Message sent to CCOD to schedule appointment   Message sent to  to arrange transportation.

## 2023-08-23 NOTE — PROGRESS NOTES
Community Health Worker Note: Telephone Call  Oncology Clinic     Data/Intervention:  Patient Name:  Jennifer Cervantes DOB/Age: [birth year]     Call From: Triage Nurse        Reason for Call:  Transportation      Assessment:     CHW also called [employer]  [phone] )  to arrange ride through patient's insurance.  [employer] arranged a round trip ride  Patient will need to call when ready for return ride home [phone]. Talked to patient and she will call [employer] to setup  time.     Plan:  Patient is aware of the transportation plan. /CHW available to assist with any other needs.      Isaura OTTO Community Health Worker  Clinic Care Coordination  [facility]  Phone: [phone]

## 2023-08-28 ENCOUNTER — INFUSION THERAPY VISIT (OUTPATIENT)
Dept: TRANSPLANT | Facility: CLINIC | Age: 23
End: 2023-08-28
Attending: PEDIATRICS
Payer: COMMERCIAL

## 2023-08-28 ENCOUNTER — NURSE TRIAGE (OUTPATIENT)
Dept: ONCOLOGY | Facility: CLINIC | Age: 23
End: 2023-08-28
Payer: COMMERCIAL

## 2023-08-28 VITALS
TEMPERATURE: 98.3 F | OXYGEN SATURATION: 100 % | HEART RATE: 104 BPM | RESPIRATION RATE: 16 BRPM | DIASTOLIC BLOOD PRESSURE: 80 MMHG | SYSTOLIC BLOOD PRESSURE: 133 MMHG

## 2023-08-28 DIAGNOSIS — D57.00 SICKLE CELL PAIN CRISIS (H): ICD-10-CM

## 2023-08-28 DIAGNOSIS — D57.00 SICKLE CELL PAIN CRISIS (H): Primary | ICD-10-CM

## 2023-08-28 DIAGNOSIS — G81.10 SPASTIC HEMIPLEGIA, UNSPECIFIED ETIOLOGY, UNSPECIFIED LATERALITY (H): ICD-10-CM

## 2023-08-28 PROCEDURE — 96374 THER/PROPH/DIAG INJ IV PUSH: CPT

## 2023-08-28 PROCEDURE — 258N000003 HC RX IP 258 OP 636: Performed by: PEDIATRICS

## 2023-08-28 PROCEDURE — 96361 HYDRATE IV INFUSION ADD-ON: CPT

## 2023-08-28 PROCEDURE — 250N000011 HC RX IP 250 OP 636: Performed by: PEDIATRICS

## 2023-08-28 PROCEDURE — 96376 TX/PRO/DX INJ SAME DRUG ADON: CPT

## 2023-08-28 PROCEDURE — 250N000013 HC RX MED GY IP 250 OP 250 PS 637: Performed by: PEDIATRICS

## 2023-08-28 RX ORDER — HEPARIN SODIUM (PORCINE) LOCK FLUSH IV SOLN 100 UNIT/ML 100 UNIT/ML
5 SOLUTION INTRAVENOUS
Status: DISCONTINUED | OUTPATIENT
Start: 2023-08-28 | End: 2023-08-28 | Stop reason: HOSPADM

## 2023-08-28 RX ORDER — OXYCODONE HYDROCHLORIDE 15 MG/1
15 TABLET ORAL EVERY 4 HOURS PRN
Qty: 15 TABLET | Refills: 0 | Status: SHIPPED | OUTPATIENT
Start: 2023-08-28 | End: 2023-09-01

## 2023-08-28 RX ORDER — DIPHENHYDRAMINE HCL 25 MG
25 CAPSULE ORAL
Status: COMPLETED | OUTPATIENT
Start: 2023-08-28 | End: 2023-08-28

## 2023-08-28 RX ORDER — DIPHENHYDRAMINE HCL 25 MG
25 CAPSULE ORAL
Status: CANCELLED
Start: 2023-10-01

## 2023-08-28 RX ORDER — HEPARIN SODIUM,PORCINE 10 UNIT/ML
5 VIAL (ML) INTRAVENOUS
Status: CANCELLED | OUTPATIENT
Start: 2023-10-01

## 2023-08-28 RX ORDER — ONDANSETRON 8 MG/1
8 TABLET, ORALLY DISINTEGRATING ORAL
Status: COMPLETED | OUTPATIENT
Start: 2023-08-28 | End: 2023-08-28

## 2023-08-28 RX ORDER — HEPARIN SODIUM (PORCINE) LOCK FLUSH IV SOLN 100 UNIT/ML 100 UNIT/ML
5 SOLUTION INTRAVENOUS
Status: CANCELLED | OUTPATIENT
Start: 2023-10-01

## 2023-08-28 RX ORDER — ONDANSETRON 4 MG/1
8 TABLET, FILM COATED ORAL
Status: CANCELLED
Start: 2023-10-01

## 2023-08-28 RX ADMIN — DIPHENHYDRAMINE HYDROCHLORIDE 25 MG: 25 CAPSULE ORAL at 09:41

## 2023-08-28 RX ADMIN — HYDROMORPHONE HYDROCHLORIDE 1 MG: 1 INJECTION, SOLUTION INTRAMUSCULAR; INTRAVENOUS; SUBCUTANEOUS at 09:43

## 2023-08-28 RX ADMIN — HYDROMORPHONE HYDROCHLORIDE 1 MG: 1 INJECTION, SOLUTION INTRAMUSCULAR; INTRAVENOUS; SUBCUTANEOUS at 11:43

## 2023-08-28 RX ADMIN — Medication 5 ML: at 12:15

## 2023-08-28 RX ADMIN — ONDANSETRON 8 MG: 8 TABLET, ORALLY DISINTEGRATING ORAL at 09:41

## 2023-08-28 RX ADMIN — HYDROMORPHONE HYDROCHLORIDE 1 MG: 1 INJECTION, SOLUTION INTRAMUSCULAR; INTRAVENOUS; SUBCUTANEOUS at 10:43

## 2023-08-28 RX ADMIN — SODIUM CHLORIDE, POTASSIUM CHLORIDE, SODIUM LACTATE AND CALCIUM CHLORIDE 1000 ML: 600; 310; 30; 20 INJECTION, SOLUTION INTRAVENOUS at 09:42

## 2023-08-28 ASSESSMENT — PAIN SCALES - GENERAL: PAINLEVEL: EXTREME PAIN (9)

## 2023-08-28 NOTE — PROGRESS NOTES
Infusion Nursing Note:  Jennifer Cervantes presents today for IV fluids and pain medications.    Patient seen by provider today: No   present during visit today: Not Applicable.    Note:   Pt received a liter of LR over two hours.  Pt received 25 mg benadryl po and 8 mg zofran ODT.    Pt received 1 mg dilaudid IV every hour for a total of three doses (3 mg dilaudid total).      Intravenous Access:  Implanted Port.    Treatment Conditions:  Not Applicable.      Post Infusion Assessment:  Patient tolerated infusion without incident.  Blood return noted pre and post infusion.  Site patent and intact, free from redness, edema or discomfort.  No evidence of extravasations.  Access discontinued per protocol.       Discharge Plan:   Prescription refills given for oxycodone.  Brought up to patient by pharmacy.  Discharge instructions reviewed with: Patient.  Patient and/or family verbalized understanding of discharge instructions and all questions answered.  Patient discharged in stable condition accompanied by: self.      Jamaica Napoles RN

## 2023-08-28 NOTE — NURSING NOTE
"Oncology Rooming Note    August 28, 2023 1:06 PM   Jennifer Cervantes is a 23 year old female who presents for:    Chief Complaint   Patient presents with    Infusion     Add on infusion for pain medications and IV fluids. Hx sickle cell disease.     Initial Vitals: /80 (BP Location: Right arm, Patient Position: Sitting, Cuff Size: Adult Regular)   Pulse 104   Temp 98.3  F (36.8  C) (Oral)   Resp 16   SpO2 100%  Estimated body mass index is 24.77 kg/m  as calculated from the following:    Height as of 5/10/23: 1.626 m (5' 4\").    Weight as of 8/11/23: 65.5 kg (144 lb 4.8 oz). There is no height or weight on file to calculate BSA.  Extreme Pain (9) Comment: Data Unavailable   No LMP recorded. Patient has had an implant.  Allergies reviewed: Yes  Medications reviewed: Yes    Medications: oxycodone refill brought to patient by pharmacist.  Pharmacy name entered into [facility]: [facility] [place], [place] - [address] [place] [address] [phone]    Clinical concerns: none       Jamaica Napoles RN              "

## 2023-08-28 NOTE — TELEPHONE ENCOUNTER
Narcotic Refill Request    Medication(s) requested:  oxycodone 15 mg tablet  Person Requesting Refill:Jennifer  What pain is the medication treating: sickle cell pain  How is the medication being taken?:1 tablet Q4H  Does pt have enough for today? No, she will be out today  Is pain being adequately controlled on the current regimen?: Yes  Experiencing any side effects from medication?: No    Date of most recent appointment:  8/11/23 with Patricia Mantilla CNP  Any No Show Visits: No  Next appointment:   9/8/23 with Patricia Mantilla CNP  Last fill date and by whom:  8/21/23 by Patricia Mantilla CNP   Reviewed: No    Routed/Paged provider: Routed to Dr. Duncan as Patricia is out of the office.

## 2023-09-01 ENCOUNTER — NURSE TRIAGE (OUTPATIENT)
Dept: ONCOLOGY | Facility: CLINIC | Age: 23
End: 2023-09-01
Payer: COMMERCIAL

## 2023-09-01 ENCOUNTER — INFUSION THERAPY VISIT (OUTPATIENT)
Dept: TRANSPLANT | Facility: CLINIC | Age: 23
End: 2023-09-01
Attending: PEDIATRICS
Payer: COMMERCIAL

## 2023-09-01 VITALS
HEART RATE: 86 BPM | OXYGEN SATURATION: 98 % | TEMPERATURE: 98.3 F | DIASTOLIC BLOOD PRESSURE: 75 MMHG | RESPIRATION RATE: 16 BRPM | SYSTOLIC BLOOD PRESSURE: 109 MMHG

## 2023-09-01 DIAGNOSIS — D57.00 SICKLE CELL PAIN CRISIS (H): ICD-10-CM

## 2023-09-01 DIAGNOSIS — D57.00 SICKLE CELL PAIN CRISIS (H): Primary | ICD-10-CM

## 2023-09-01 DIAGNOSIS — G81.10 SPASTIC HEMIPLEGIA, UNSPECIFIED ETIOLOGY, UNSPECIFIED LATERALITY (H): ICD-10-CM

## 2023-09-01 PROCEDURE — 96376 TX/PRO/DX INJ SAME DRUG ADON: CPT

## 2023-09-01 PROCEDURE — 96374 THER/PROPH/DIAG INJ IV PUSH: CPT

## 2023-09-01 PROCEDURE — 96361 HYDRATE IV INFUSION ADD-ON: CPT

## 2023-09-01 PROCEDURE — 250N000011 HC RX IP 250 OP 636: Mod: JZ | Performed by: PEDIATRICS

## 2023-09-01 PROCEDURE — 258N000003 HC RX IP 258 OP 636: Performed by: PEDIATRICS

## 2023-09-01 PROCEDURE — 250N000013 HC RX MED GY IP 250 OP 250 PS 637: Performed by: PEDIATRICS

## 2023-09-01 RX ORDER — HEPARIN SODIUM (PORCINE) LOCK FLUSH IV SOLN 100 UNIT/ML 100 UNIT/ML
5 SOLUTION INTRAVENOUS
Status: DISCONTINUED | OUTPATIENT
Start: 2023-09-01 | End: 2023-09-01 | Stop reason: HOSPADM

## 2023-09-01 RX ORDER — ONDANSETRON 4 MG/1
8 TABLET, FILM COATED ORAL
Status: CANCELLED
Start: 2023-10-01

## 2023-09-01 RX ORDER — OXYCODONE HYDROCHLORIDE 15 MG/1
15 TABLET ORAL EVERY 4 HOURS PRN
Qty: 15 TABLET | Refills: 0 | Status: SHIPPED | OUTPATIENT
Start: 2023-09-01 | End: 2023-09-08

## 2023-09-01 RX ORDER — DIPHENHYDRAMINE HCL 25 MG
25 CAPSULE ORAL
Status: CANCELLED
Start: 2023-10-01

## 2023-09-01 RX ORDER — HEPARIN SODIUM (PORCINE) LOCK FLUSH IV SOLN 100 UNIT/ML 100 UNIT/ML
5 SOLUTION INTRAVENOUS
Status: CANCELLED | OUTPATIENT
Start: 2023-10-01

## 2023-09-01 RX ORDER — DIPHENHYDRAMINE HCL 25 MG
25 CAPSULE ORAL
Status: COMPLETED | OUTPATIENT
Start: 2023-09-01 | End: 2023-09-01

## 2023-09-01 RX ORDER — ONDANSETRON 8 MG/1
8 TABLET, ORALLY DISINTEGRATING ORAL
Status: COMPLETED | OUTPATIENT
Start: 2023-09-01 | End: 2023-09-01

## 2023-09-01 RX ORDER — HEPARIN SODIUM,PORCINE 10 UNIT/ML
5 VIAL (ML) INTRAVENOUS
Status: CANCELLED | OUTPATIENT
Start: 2023-10-01

## 2023-09-01 RX ADMIN — HYDROMORPHONE HYDROCHLORIDE 1 MG: 1 INJECTION, SOLUTION INTRAMUSCULAR; INTRAVENOUS; SUBCUTANEOUS at 09:52

## 2023-09-01 RX ADMIN — SODIUM CHLORIDE, POTASSIUM CHLORIDE, SODIUM LACTATE AND CALCIUM CHLORIDE 1000 ML: 600; 310; 30; 20 INJECTION, SOLUTION INTRAVENOUS at 08:50

## 2023-09-01 RX ADMIN — HYDROMORPHONE HYDROCHLORIDE 1 MG: 1 INJECTION, SOLUTION INTRAMUSCULAR; INTRAVENOUS; SUBCUTANEOUS at 10:52

## 2023-09-01 RX ADMIN — ONDANSETRON 8 MG: 8 TABLET, ORALLY DISINTEGRATING ORAL at 08:48

## 2023-09-01 RX ADMIN — HYDROMORPHONE HYDROCHLORIDE 1 MG: 1 INJECTION, SOLUTION INTRAMUSCULAR; INTRAVENOUS; SUBCUTANEOUS at 08:49

## 2023-09-01 RX ADMIN — Medication 5 ML: at 11:37

## 2023-09-01 RX ADMIN — DIPHENHYDRAMINE HYDROCHLORIDE 25 MG: 25 CAPSULE ORAL at 08:48

## 2023-09-01 ASSESSMENT — PAIN SCALES - GENERAL: PAINLEVEL: WORST PAIN (10)

## 2023-09-01 NOTE — TELEPHONE ENCOUNTER
Oncology Nurse Triage - Sickle Cell Pain Crisis:    Situation: Jennifer  calling about Sickle Cell Pain Crisis    Background:     Patient's last infusion was 08/28/23  Last clinic visit date:08/11/23 wMikael Mantilla  Next follow-up appt on 09/08/23 w/Patricia Mantilla  Does patient have active treatment plan?  Yes    (If pt has been seen in ED or infusion in last 3 days or no showed last clinic visit then does not meet protocol unless specified in pt therapy plan)    Assessment of Symptoms:  Onset/Duration of symptoms: 1 day    Is it typical sickle cell pain? Yes   Location: Legs bilateral  Character: Sharp           Intensity: 10/10    Any radiation of pain, numbness, tingling, weakness, warmth, swelling, discoloration of extremities?No     Fever?No  (if yes max temperature recorded in last 24 hours):      Chest Pain Present: No     Shortness of breath: No     Other home therapies tried: HEAT/HEATING PAD and WARM BATH     Last home medication taken and when: Oxycodone 0600    Any Refills Needed?: No     Does patient have transportation & length of time to get to clinic: No       Grades for reference:     Grade 1 -   Patient has active treatment plan.   Patients last scheduled clinic appointment was attended  Patient has not been seen in infusion or ED in the last 3 days (clarify exclusions in therapy plans)   Afebrile   Typical sickle cell pain   Home medications have been tried   No other symptoms       Recommendations:   0706 Added to infusion wait list for IVF/pain meds per protocol.  0715 BMT slot available at 0830  0716 Pt confirming she is able to make appt  0720 SW notified to assist with ride  0724 Message sent to CCOD to assist in adding pt on to schedule    If symptoms worsen while waiting for call back, and/or you experience fever, chills, SOB, chest pain, cough, n/v, dizziness, numbness, swelling, discoloration of extremities, then seek emergency evaluation in Emergency Department.     Please note, if you are  late for your appt, you risk losing your infusion appt as it may delay another patient's infusion who arrived on time.

## 2023-09-01 NOTE — PROGRESS NOTES
Infusion Nursing Note:  Jennifer Cervantes presents today for IV fluids and pain medications.    Patient seen by provider today: No   present during visit today: Not Applicable.    Note:   Pt received a liter of LR over 2 hours.    Pt received 8 mg zofran ODT and benadryl 25 mg po.    Pt received dilaudid 1 mg IV every hour for a total of three doses (3 mg dilaudid total).      Intravenous Access:  Implanted Port.    Treatment Conditions:  Not Applicable.      Post Infusion Assessment:  Patient tolerated infusion without incident.  Blood return noted pre and post infusion.  Site patent and intact, free from redness, edema or discomfort.  No evidence of extravasations.  Access discontinued per protocol.       Discharge Plan:   Discharge instructions reviewed with: Patient. Prescription for oxycodone brought up to patient by pharmacy staff.  Patient and/or family verbalized understanding of discharge instructions and all questions answered.  Patient discharged in stable condition accompanied by: self.  Departure Mode: Ambulatory.      Jamaica Napoles RN

## 2023-09-01 NOTE — NURSING NOTE
"Oncology Rooming Note    September 1, 2023 9:14 AM   Jennifer Cervantes is a 23 year old female who presents for:    Chief Complaint   Patient presents with    Infusion     Add on IV fluids and pain medications. Hx sickle cell disease.     Initial Vitals: /75 (BP Location: Right arm, Patient Position: Sitting, Cuff Size: Adult Regular)   Pulse 86   Temp 98.3  F (36.8  C) (Oral)   Resp 16   SpO2 98%  Estimated body mass index is 24.77 kg/m  as calculated from the following:    Height as of 5/10/23: 1.626 m (5' 4\").    Weight as of 8/11/23: 65.5 kg (144 lb 4.8 oz). There is no height or weight on file to calculate BSA.  Worst Pain (10) Comment: Data Unavailable   No LMP recorded. Patient has had an implant.  Allergies reviewed: Yes  Medications reviewed: Yes    Medications: Medication refills not needed today.  Pharmacy name entered into [employer]: [facility] [place], [place] - [address] [place] [address] [phone]    Clinical concerns: pt reports a dry cough since she was inpatient, denies fever or chills, nonproductive cough, using inhalers at home. Patricia Mcdaniels was contacted.       Jamaica Napoles RN              "

## 2023-09-06 ENCOUNTER — NURSE TRIAGE (OUTPATIENT)
Dept: ONCOLOGY | Facility: CLINIC | Age: 23
End: 2023-09-06
Payer: COMMERCIAL

## 2023-09-06 ENCOUNTER — PATIENT OUTREACH (OUTPATIENT)
Dept: CARE COORDINATION | Facility: CLINIC | Age: 23
End: 2023-09-06
Payer: COMMERCIAL

## 2023-09-06 ENCOUNTER — INFUSION THERAPY VISIT (OUTPATIENT)
Dept: TRANSPLANT | Facility: CLINIC | Age: 23
End: 2023-09-06
Attending: PEDIATRICS
Payer: COMMERCIAL

## 2023-09-06 VITALS
OXYGEN SATURATION: 96 % | TEMPERATURE: 98.7 F | RESPIRATION RATE: 18 BRPM | SYSTOLIC BLOOD PRESSURE: 125 MMHG | HEART RATE: 90 BPM | DIASTOLIC BLOOD PRESSURE: 75 MMHG

## 2023-09-06 DIAGNOSIS — D57.00 SICKLE CELL PAIN CRISIS (H): Primary | ICD-10-CM

## 2023-09-06 DIAGNOSIS — G81.10 SPASTIC HEMIPLEGIA, UNSPECIFIED ETIOLOGY, UNSPECIFIED LATERALITY (H): ICD-10-CM

## 2023-09-06 PROCEDURE — 96361 HYDRATE IV INFUSION ADD-ON: CPT

## 2023-09-06 PROCEDURE — 96376 TX/PRO/DX INJ SAME DRUG ADON: CPT

## 2023-09-06 PROCEDURE — 258N000003 HC RX IP 258 OP 636: Performed by: PEDIATRICS

## 2023-09-06 PROCEDURE — 250N000013 HC RX MED GY IP 250 OP 250 PS 637: Performed by: PEDIATRICS

## 2023-09-06 PROCEDURE — 96374 THER/PROPH/DIAG INJ IV PUSH: CPT

## 2023-09-06 PROCEDURE — 250N000011 HC RX IP 250 OP 636: Performed by: PEDIATRICS

## 2023-09-06 RX ORDER — HEPARIN SODIUM,PORCINE 10 UNIT/ML
5 VIAL (ML) INTRAVENOUS
Status: CANCELLED | OUTPATIENT
Start: 2023-10-01

## 2023-09-06 RX ORDER — HEPARIN SODIUM (PORCINE) LOCK FLUSH IV SOLN 100 UNIT/ML 100 UNIT/ML
5 SOLUTION INTRAVENOUS
Status: CANCELLED | OUTPATIENT
Start: 2023-10-01

## 2023-09-06 RX ORDER — ONDANSETRON 4 MG/1
8 TABLET, ORALLY DISINTEGRATING ORAL
Status: COMPLETED | OUTPATIENT
Start: 2023-09-06 | End: 2023-09-06

## 2023-09-06 RX ORDER — HEPARIN SODIUM (PORCINE) LOCK FLUSH IV SOLN 100 UNIT/ML 100 UNIT/ML
5 SOLUTION INTRAVENOUS
Status: DISCONTINUED | OUTPATIENT
Start: 2023-09-06 | End: 2023-09-06 | Stop reason: HOSPADM

## 2023-09-06 RX ORDER — DIPHENHYDRAMINE HCL 25 MG
25 CAPSULE ORAL
Status: CANCELLED
Start: 2023-10-01

## 2023-09-06 RX ORDER — DIPHENHYDRAMINE HCL 25 MG
25 CAPSULE ORAL
Status: COMPLETED | OUTPATIENT
Start: 2023-09-06 | End: 2023-09-06

## 2023-09-06 RX ORDER — ONDANSETRON 4 MG/1
8 TABLET, FILM COATED ORAL
Status: CANCELLED
Start: 2023-10-01

## 2023-09-06 RX ADMIN — HYDROMORPHONE HYDROCHLORIDE 1 MG: 1 INJECTION, SOLUTION INTRAMUSCULAR; INTRAVENOUS; SUBCUTANEOUS at 14:49

## 2023-09-06 RX ADMIN — SODIUM CHLORIDE, POTASSIUM CHLORIDE, SODIUM LACTATE AND CALCIUM CHLORIDE 1000 ML: 600; 310; 30; 20 INJECTION, SOLUTION INTRAVENOUS at 12:47

## 2023-09-06 RX ADMIN — DIPHENHYDRAMINE HYDROCHLORIDE 25 MG: 25 CAPSULE ORAL at 13:03

## 2023-09-06 RX ADMIN — HYDROMORPHONE HYDROCHLORIDE 1 MG: 1 INJECTION, SOLUTION INTRAMUSCULAR; INTRAVENOUS; SUBCUTANEOUS at 13:48

## 2023-09-06 RX ADMIN — Medication 5 ML: at 15:30

## 2023-09-06 RX ADMIN — ONDANSETRON 8 MG: 4 TABLET, ORALLY DISINTEGRATING ORAL at 13:03

## 2023-09-06 RX ADMIN — HYDROMORPHONE HYDROCHLORIDE 1 MG: 1 INJECTION, SOLUTION INTRAMUSCULAR; INTRAVENOUS; SUBCUTANEOUS at 12:47

## 2023-09-06 NOTE — PROGRESS NOTES
Infusion Nursing Note:  Jennifer Cervantes presents today for add-on infusion.    Patient seen by provider today: No   present during visit today: Not Applicable.    Note: Pt here today for add-on IVF and pain medication. VSS. Meds and allergies reviewed. Pt reports 9/10 back pain, but no other acute concerns. Pt received 1L LR, 8 mg Zofran ODT, 25 mg Benadryl PO, and 1 mg Dilaudid IVP x3 doses (total 3 mg Dilaudid IVP). Pt reported pain to be improved and tolerable at time of discharge.      Intravenous Access:  Implanted Port.    Treatment Conditions:  Not Applicable.      Post Infusion Assessment:  Patient tolerated infusion without incident.  Blood return noted pre and post infusion.  Site patent and intact, free from redness, edema or discomfort.  No evidence of extravasations.  Access discontinued per protocol.       Discharge Plan:   Patient discharged in stable condition accompanied by: self.  Departure Mode: Ambulatory.      Jaida Kelly RN

## 2023-09-06 NOTE — TELEPHONE ENCOUNTER
Oncology Nurse Triage - Sickle Cell Pain Crisis:  Situation: Jennifer  calling about Sickle Cell Pain Crisis, requesting to be added on for IV fluids and pain medicine    Background:   Patient's last infusion was 9/1/23  Last clinic visit date:8/11/23  Does patient have active treatment plan?  Yes    Assessment of Symptoms:  Onset/Duration of symptoms: 4 day- pt states she started working and has been trying to push through pain.     Is it typical sickle cell pain? Yes   Location: back  Character: Sharp           Intensity: 9/10    Any radiation of pain, numbness, tingling, weakness, warmth, swelling, discoloration of arms or legs?No     Fever?No    Chest Pain Present: No     Shortness of breath: No     Other home therapies tried: HEAT/HEATING PAD     Last home medication taken and when: 0600 Oxycodone    Any Refills Needed?: No     Does patient have transportation & length of time to get to clinic: No needs transportation    Recommendations:   Added to infusion wait list  Per Jaida, charge nurse in BMT infusion, BMT can take pt at 1230. Call to pt, confirmed appt.   Message sent to scheduling and SW at 0711.    If you do not hear from the infusion center by 2pm then you will not be able to get in for an infusion today. If symptoms worsen while waiting for call back, and/or you experience fever, chills, SOB, chest pain, cough, n/v, dizziness, numbness, swelling, discoloration of extremities, then seek emergency evaluation in Emergency Department.     Please note, if you are late for your appt, you risk losing your infusion appt as it may delay another patient's infusion who arrived on time.

## 2023-09-06 NOTE — PROGRESS NOTES
Social Work - Transportation  [facility]    Data/Intervention:  Patient Name: Jennifer Cervantes Goes By: Jennifer    /Age: 2000 (23 year old)    Referral From: [facility]  Reason for Referral:  support requested for patient transportation needs for today's appointment.  Assessment:  called [employer] to arrange ride through patient's insurance. [employer] arranged  for patient from home with [employer]. Patient will need to call [phone] when ready for return ride home.  Plan: Patient is aware of the transportation plan.  available to assist with any other needs.    CARLOS Chavez,UDAY  Hematology/Oncology Social Worker  Phone:[phone] Pager: [phone]

## 2023-09-07 RX ORDER — HEPARIN SODIUM (PORCINE) LOCK FLUSH IV SOLN 100 UNIT/ML 100 UNIT/ML
5 SOLUTION INTRAVENOUS
Status: CANCELLED | OUTPATIENT
Start: 2023-09-08

## 2023-09-07 RX ORDER — ALBUTEROL SULFATE 90 UG/1
1-2 AEROSOL, METERED RESPIRATORY (INHALATION)
Status: CANCELLED
Start: 2023-09-08

## 2023-09-07 RX ORDER — HEPARIN SODIUM,PORCINE 10 UNIT/ML
5 VIAL (ML) INTRAVENOUS
Status: CANCELLED | OUTPATIENT
Start: 2023-09-08

## 2023-09-07 RX ORDER — EPINEPHRINE 1 MG/ML
0.3 INJECTION, SOLUTION INTRAMUSCULAR; SUBCUTANEOUS EVERY 5 MIN PRN
Status: CANCELLED | OUTPATIENT
Start: 2023-09-08

## 2023-09-07 RX ORDER — MEPERIDINE HYDROCHLORIDE 25 MG/ML
25 INJECTION INTRAMUSCULAR; INTRAVENOUS; SUBCUTANEOUS EVERY 30 MIN PRN
Status: CANCELLED | OUTPATIENT
Start: 2023-09-08

## 2023-09-07 RX ORDER — ALBUTEROL SULFATE 0.83 MG/ML
2.5 SOLUTION RESPIRATORY (INHALATION)
Status: CANCELLED | OUTPATIENT
Start: 2023-09-08

## 2023-09-07 RX ORDER — DIPHENHYDRAMINE HYDROCHLORIDE 50 MG/ML
50 INJECTION INTRAMUSCULAR; INTRAVENOUS
Status: CANCELLED
Start: 2023-09-08

## 2023-09-07 RX ORDER — METHYLPREDNISOLONE SODIUM SUCCINATE 125 MG/2ML
125 INJECTION, POWDER, LYOPHILIZED, FOR SOLUTION INTRAMUSCULAR; INTRAVENOUS
Status: CANCELLED
Start: 2023-09-08

## 2023-09-08 ENCOUNTER — APPOINTMENT (OUTPATIENT)
Dept: LAB | Facility: CLINIC | Age: 23
End: 2023-09-08
Attending: PEDIATRICS
Payer: COMMERCIAL

## 2023-09-08 ENCOUNTER — NURSE TRIAGE (OUTPATIENT)
Dept: ONCOLOGY | Facility: CLINIC | Age: 23
End: 2023-09-08
Payer: COMMERCIAL

## 2023-09-08 ENCOUNTER — INFUSION THERAPY VISIT (OUTPATIENT)
Dept: ONCOLOGY | Facility: CLINIC | Age: 23
End: 2023-09-08
Attending: PEDIATRICS
Payer: COMMERCIAL

## 2023-09-08 ENCOUNTER — TELEPHONE (OUTPATIENT)
Dept: ONCOLOGY | Facility: CLINIC | Age: 23
End: 2023-09-08

## 2023-09-08 VITALS
HEART RATE: 84 BPM | RESPIRATION RATE: 18 BRPM | OXYGEN SATURATION: 99 % | TEMPERATURE: 98.2 F | BODY MASS INDEX: 24.75 KG/M2 | WEIGHT: 144.2 LBS | SYSTOLIC BLOOD PRESSURE: 123 MMHG | DIASTOLIC BLOOD PRESSURE: 80 MMHG

## 2023-09-08 DIAGNOSIS — E83.111 IRON OVERLOAD DUE TO REPEATED RED BLOOD CELL TRANSFUSIONS: ICD-10-CM

## 2023-09-08 DIAGNOSIS — D57.1 HB-SS DISEASE WITHOUT CRISIS (H): Primary | ICD-10-CM

## 2023-09-08 DIAGNOSIS — D57.00 SICKLE CELL PAIN CRISIS (H): Primary | ICD-10-CM

## 2023-09-08 DIAGNOSIS — R05.8 DRY COUGH: ICD-10-CM

## 2023-09-08 DIAGNOSIS — D57.00 SICKLE CELL PAIN CRISIS (H): ICD-10-CM

## 2023-09-08 DIAGNOSIS — G81.10 SPASTIC HEMIPLEGIA, UNSPECIFIED ETIOLOGY, UNSPECIFIED LATERALITY (H): ICD-10-CM

## 2023-09-08 DIAGNOSIS — I82.611 SUPERFICIAL VENOUS THROMBOSIS OF ARM, RIGHT: ICD-10-CM

## 2023-09-08 DIAGNOSIS — D57.1 HB-SS DISEASE WITHOUT CRISIS (H): ICD-10-CM

## 2023-09-08 LAB
ANION GAP SERPL CALCULATED.3IONS-SCNC: 10 MMOL/L (ref 7–15)
BASOPHILS # BLD MANUAL: 0.1 10E3/UL (ref 0–0.2)
BASOPHILS NFR BLD MANUAL: 1 %
BUN SERPL-MCNC: 17.1 MG/DL (ref 6–20)
CALCIUM SERPL-MCNC: 9.1 MG/DL (ref 8.6–10)
CHLORIDE SERPL-SCNC: 107 MMOL/L (ref 98–107)
CREAT SERPL-MCNC: 0.56 MG/DL (ref 0.51–0.95)
DEPRECATED HCO3 PLAS-SCNC: 22 MMOL/L (ref 22–29)
EGFRCR SERPLBLD CKD-EPI 2021: >90 ML/MIN/1.73M2
EOSINOPHIL # BLD MANUAL: 0.4 10E3/UL (ref 0–0.7)
EOSINOPHIL NFR BLD MANUAL: 4 %
ERYTHROCYTE [DISTWIDTH] IN BLOOD BY AUTOMATED COUNT: 23 % (ref 10–15)
FERRITIN SERPL-MCNC: 3928 NG/ML (ref 6–175)
GLUCOSE SERPL-MCNC: 111 MG/DL (ref 70–99)
HCT VFR BLD AUTO: 24 % (ref 35–47)
HGB BLD-MCNC: 8.1 G/DL (ref 11.7–15.7)
LYMPHOCYTES # BLD MANUAL: 1.6 10E3/UL (ref 0.8–5.3)
LYMPHOCYTES NFR BLD MANUAL: 17 %
MCH RBC QN AUTO: 26.7 PG (ref 26.5–33)
MCHC RBC AUTO-ENTMCNC: 33.8 G/DL (ref 31.5–36.5)
MCV RBC AUTO: 79 FL (ref 78–100)
MONOCYTES # BLD MANUAL: 0.4 10E3/UL (ref 0–1.3)
MONOCYTES NFR BLD MANUAL: 4 %
NEUTROPHILS # BLD MANUAL: 6.8 10E3/UL (ref 1.6–8.3)
NEUTROPHILS NFR BLD MANUAL: 74 %
NRBC # BLD AUTO: 2.2 10E3/UL
NRBC BLD MANUAL-RTO: 24 %
PLAT MORPH BLD: ABNORMAL
PLATELET # BLD AUTO: 520 10E3/UL (ref 150–450)
POTASSIUM SERPL-SCNC: 4.1 MMOL/L (ref 3.4–5.3)
RBC # BLD AUTO: 3.03 10E6/UL (ref 3.8–5.2)
RBC MORPH BLD: ABNORMAL
RETICS # AUTO: 0.24 10E6/UL (ref 0.03–0.1)
RETICS/RBC NFR AUTO: 8.1 % (ref 0.5–2)
SICKLE CELLS BLD QL SMEAR: SLIGHT
SODIUM SERPL-SCNC: 139 MMOL/L (ref 136–145)
TARGETS BLD QL SMEAR: ABNORMAL
WBC # BLD AUTO: 9.2 10E3/UL (ref 4–11)

## 2023-09-08 PROCEDURE — 96361 HYDRATE IV INFUSION ADD-ON: CPT

## 2023-09-08 PROCEDURE — 85007 BL SMEAR W/DIFF WBC COUNT: CPT | Performed by: PEDIATRICS

## 2023-09-08 PROCEDURE — 85027 COMPLETE CBC AUTOMATED: CPT | Performed by: PEDIATRICS

## 2023-09-08 PROCEDURE — 250N000011 HC RX IP 250 OP 636: Mod: JZ | Performed by: PEDIATRICS

## 2023-09-08 PROCEDURE — 85045 AUTOMATED RETICULOCYTE COUNT: CPT | Performed by: PEDIATRICS

## 2023-09-08 PROCEDURE — 36591 DRAW BLOOD OFF VENOUS DEVICE: CPT | Performed by: PEDIATRICS

## 2023-09-08 PROCEDURE — 258N000003 HC RX IP 258 OP 636: Performed by: PEDIATRICS

## 2023-09-08 PROCEDURE — G0463 HOSPITAL OUTPT CLINIC VISIT: HCPCS | Mod: 25 | Performed by: REGISTERED NURSE

## 2023-09-08 PROCEDURE — 82728 ASSAY OF FERRITIN: CPT

## 2023-09-08 PROCEDURE — 99215 OFFICE O/P EST HI 40 MIN: CPT | Performed by: REGISTERED NURSE

## 2023-09-08 PROCEDURE — 96376 TX/PRO/DX INJ SAME DRUG ADON: CPT

## 2023-09-08 PROCEDURE — 99417 PROLNG OP E/M EACH 15 MIN: CPT | Performed by: REGISTERED NURSE

## 2023-09-08 PROCEDURE — 250N000013 HC RX MED GY IP 250 OP 250 PS 637: Performed by: PEDIATRICS

## 2023-09-08 PROCEDURE — 250N000011 HC RX IP 250 OP 636: Mod: JZ | Performed by: REGISTERED NURSE

## 2023-09-08 PROCEDURE — 96375 TX/PRO/DX INJ NEW DRUG ADDON: CPT

## 2023-09-08 PROCEDURE — 96365 THER/PROPH/DIAG IV INF INIT: CPT

## 2023-09-08 PROCEDURE — 96413 CHEMO IV INFUSION 1 HR: CPT

## 2023-09-08 PROCEDURE — 80048 BASIC METABOLIC PNL TOTAL CA: CPT | Performed by: PEDIATRICS

## 2023-09-08 RX ORDER — HEPARIN SODIUM (PORCINE) LOCK FLUSH IV SOLN 100 UNIT/ML 100 UNIT/ML
5 SOLUTION INTRAVENOUS
Status: DISCONTINUED | OUTPATIENT
Start: 2023-09-08 | End: 2023-09-08 | Stop reason: HOSPADM

## 2023-09-08 RX ORDER — ONDANSETRON 8 MG/1
8 TABLET, ORALLY DISINTEGRATING ORAL
Status: COMPLETED | OUTPATIENT
Start: 2023-09-08 | End: 2023-09-08

## 2023-09-08 RX ORDER — ASPIRIN 81 MG/1
81 TABLET, CHEWABLE ORAL 2 TIMES DAILY
Qty: 60 TABLET | Refills: 11 | Status: SHIPPED | OUTPATIENT
Start: 2023-09-08 | End: 2023-12-28

## 2023-09-08 RX ORDER — DEFERASIROX 360 MG/1
1440 TABLET, FILM COATED ORAL EVERY EVENING
Qty: 120 TABLET | Refills: 4 | Status: SHIPPED | OUTPATIENT
Start: 2023-09-08 | End: 2023-12-28

## 2023-09-08 RX ORDER — OXYCODONE HYDROCHLORIDE 15 MG/1
15 TABLET ORAL EVERY 4 HOURS PRN
Qty: 10 TABLET | Refills: 0 | Status: SHIPPED | OUTPATIENT
Start: 2023-09-08 | End: 2023-09-15

## 2023-09-08 RX ORDER — DIPHENHYDRAMINE HCL 25 MG
25 CAPSULE ORAL
Status: COMPLETED | OUTPATIENT
Start: 2023-09-08 | End: 2023-09-08

## 2023-09-08 RX ORDER — HEPARIN SODIUM (PORCINE) LOCK FLUSH IV SOLN 100 UNIT/ML 100 UNIT/ML
5 SOLUTION INTRAVENOUS ONCE
Status: COMPLETED | OUTPATIENT
Start: 2023-09-08 | End: 2023-09-08

## 2023-09-08 RX ORDER — ONDANSETRON 4 MG/1
8 TABLET, FILM COATED ORAL
Status: CANCELLED
Start: 2023-10-01

## 2023-09-08 RX ORDER — HEPARIN SODIUM,PORCINE 10 UNIT/ML
5 VIAL (ML) INTRAVENOUS
Status: CANCELLED | OUTPATIENT
Start: 2023-10-01

## 2023-09-08 RX ORDER — HEPARIN SODIUM (PORCINE) LOCK FLUSH IV SOLN 100 UNIT/ML 100 UNIT/ML
5 SOLUTION INTRAVENOUS
Status: CANCELLED | OUTPATIENT
Start: 2023-10-01

## 2023-09-08 RX ORDER — DIPHENHYDRAMINE HCL 25 MG
25 CAPSULE ORAL
Status: CANCELLED
Start: 2023-10-01

## 2023-09-08 RX ADMIN — HYDROMORPHONE HYDROCHLORIDE 1 MG: 1 INJECTION, SOLUTION INTRAMUSCULAR; INTRAVENOUS; SUBCUTANEOUS at 12:28

## 2023-09-08 RX ADMIN — Medication 5 ML: at 15:38

## 2023-09-08 RX ADMIN — DIPHENHYDRAMINE HYDROCHLORIDE 25 MG: 25 CAPSULE ORAL at 12:28

## 2023-09-08 RX ADMIN — SODIUM CHLORIDE 300 MG: 9 INJECTION, SOLUTION INTRAVENOUS at 13:36

## 2023-09-08 RX ADMIN — HYDROMORPHONE HYDROCHLORIDE 1 MG: 1 INJECTION, SOLUTION INTRAMUSCULAR; INTRAVENOUS; SUBCUTANEOUS at 14:41

## 2023-09-08 RX ADMIN — ONDANSETRON 8 MG: 8 TABLET, ORALLY DISINTEGRATING ORAL at 12:31

## 2023-09-08 RX ADMIN — HYDROMORPHONE HYDROCHLORIDE 1 MG: 1 INJECTION, SOLUTION INTRAMUSCULAR; INTRAVENOUS; SUBCUTANEOUS at 13:33

## 2023-09-08 RX ADMIN — SODIUM CHLORIDE 250 ML: 9 INJECTION, SOLUTION INTRAVENOUS at 13:36

## 2023-09-08 RX ADMIN — Medication 5 ML: at 10:34

## 2023-09-08 RX ADMIN — SODIUM CHLORIDE, POTASSIUM CHLORIDE, SODIUM LACTATE AND CALCIUM CHLORIDE 1000 ML: 600; 310; 30; 20 INJECTION, SOLUTION INTRAVENOUS at 12:28

## 2023-09-08 ASSESSMENT — PAIN SCALES - GENERAL: PAINLEVEL: EXTREME PAIN (9)

## 2023-09-08 NOTE — TELEPHONE ENCOUNTER
Narcotic Refill Request    Medication(s) requested:  Oxycodone  Person Requesting Refill:Jennifer  What pain is the medication treating: sickle cell pain  How is the medication being taken?:1 tab Q 4H  Does pt have enough for today? yes  Is pain being adequately controlled on the current regimen?: yes  Experiencing any side effects from medication?: no    Date of most recent appointment:  today w/Patricia Mantilla CNP  Any No Show Visits:no  Next appointment:   today with Patricia Mantilla CNP  Last fill date and by whom:  Chepe Sr 9/1/23   Reviewed: no    Routed/Paged provider: Patricia Mantilla CNP

## 2023-09-08 NOTE — PROGRESS NOTES
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 She had multiple thromboembolic events in 2021 despite adherent anticoagulation use (though warfarin was perpetually low) and there are concerns for chronic thromboembolic disease but did not have pulmonary HTN on a November 2021 cath. She is maintained on chronic PO opioids and twice-weekly infusion visits (since 1/24/22) but has been able to be maintained on this regimen and has stayed out of the ED most of the time with even rarer admissions.     Interval History:  Jennifer is seen today for routine follow-up. She was admitted to [facility] for sickle cell pain crisis once since our last visit. During that admission her hemoglobin dropped as low as 4.4. She was transfused with 2 units PRBCs.     She had accepted a job at a local [employer] just prior to that hospitalization and was able to start once she was discharged. Work has been going very well. Her company and fellow employees have been very accommodating of her physical limitations. She is able to alternate sitting and standing at work which has been helpful in reducing pain. There have been a few weeks where she has only come in for infusions once. She is very proud of the fact that she hasn't had to miss work for an infusion visit.     She continues to be successful in tapering her oxycodone. She requests to reduced her total tablets per prescription to 10 today. She continues to fill this weekly consistently. Typically after work she  "takes a hot shower and rests if she is uncomfortable. She admits that previously she was using oxycodone when she wanted to rather than when she really needed to. She always takes Tylenol before determining whether she may need oxycodone.    She has been very happy with relationships in her personal life recently. She and her mom and getting along very well. Jennifer is hopeful to take her mom to get their nails done once she receives her first check. She also notes she and her boyfriend have been dating for almost a year. She has been very pleased with how supportive and encouraging he has been for her.    Sickle Cell Disease Comprehensive Checklist  Bone Health/Avascular Necrosis Screening/Symptoms (each visit): no new concerns today  Leg Ulcer evaluation (every visit): none  Hypertension (every visit):stable 3/10/23  Last pulmonary evaluation (asthma, AMAN, pulm HTN): 9/28/22  Stroke/silent cerebral infarct Hx (Y/N): Yes TIA ~2014, first event ~age 1 with full stroke and R sided weakness  Last PCP Visit: 3/6/23  Vaccines:  PCV13: 5/13/19  Pneumovax (PPSV23): 3/04, 10/09, 7/12/19 (next due 7/2024)  Menactra: 4/2010, 9/2015 (MCV done 8/16/21)  Influenza: 11/17/2022, due this fall  Audiology (chelation): done 6/2020, normal.. However, on 6/7, \"While there is no significant change in grade on the CTCAE 4.03 Scale, there were hearing changes bilaterally for both standard and extended high frequency audiometry.  Will need to determine if an ENT consult is advised due to the asymmetry in extended high frequency testing.\"     Plan last reviewed with patient: 7/11/22    Patient background: 23 yo F, enjoys movies and kids though there are times where she does not really want to talk to people. Does not have a lot of social support at home.     Sickle Cell Disease History  Primary Hematologist Team: Jose Rafael Duncan  PCP: none  Genotype: SS  Acute Pain Crisis Treatment: (avoiding IV opioids for now, which she has agreed " to)  ER   Oxycodone 15-20 mg x1  Ketamine 4mg IV x 2--helps with opioid sparing  Toradol 15 mg IV x1   Maintenance IV fluids with LR  Other: Zofran 8 mg IV PRN nausea  Inpatient:  Home oxycodone/Oxycontin regimen, though home oxycodone dosing could be increased to 20 mg to start  PCA plan:   None for now  Other Medications: Zofran  She has had success with ketamine starting at 4mg/h and advancing only to 6mg/h, as 8mg/h made her feel quite poorly..  ASA  Supportive Care: Docusate, Senna  Chronic Pain Medications:    Home regimen Oxycontin 10 mg q12h, oxycodone 15 mg p.o. q.4-6 hours p.r.n. breakthrough pain.  She also continues on Voltaren gel, and Zoloft among other medications.    -Also benefits from mental health visits, acupuncture  Baseline Hemoglobin: 7 g/dl without chronic transfusions  Hydroxyurea use: Yes  H/O blood transfusions: Yes, several (iron overload) Most recent 11/20/2021  H/O Transfusion Reactions: no  Antibodies:none  H/O Acute Chest Syndrome: Yes  Last episode:9/05/22 (previously 4/26/21, 10/2019)   ICU/intubation: not with 9/2022 admission  H/O Stroke: Yes (managed with chronic transfusions in the past, stopped late Spring 2020)  H/O VTE: Yes (2/2021)  H/O Cholecystectomy or Splenectomy: no  H/O Asthma, Pulm HTN, AVN, Leg Ulcers, Nephropathy, Retinopathy, etc: Iron overload, asthma, chronic lung disease, physical limitations from early stroke    ---------------------------------------  Jennifer Cervantes's Goals (discussed today, 9/8/23)    1-3 month goal:  Continue to  hours at work     6 month goal:  Save money for back tattoo for her birthday    12 month goal:  Save money for her own place     Disease-specific goal(s):  Continue to wean oxycodone down. Minimize infusion center visits.  ---------------------------------------      Current Outpatient Medications   Medication Sig Dispense Refill    acetaminophen (TYLENOL) 325 MG tablet Take 2 tablets (650 mg) by mouth every 6 hours as needed  for mild pain 120 tablet 3    albuterol (PROAIR HFA/PROVENTIL HFA/VENTOLIN HFA) 108 (90 Base) MCG/ACT inhaler Inhale 2 puffs into the lungs every 6 hours as needed for shortness of breath or wheezing 8.5 g 3    albuterol (PROVENTIL) (2.5 MG/3ML) 0.083% neb solution Take 2 vials (5 mg) by nebulization every 6 hours as needed for shortness of breath or wheezing 90 mL 3    aspirin (ASA) 81 MG chewable tablet Take 1 tablet (81 mg) by mouth 2 times daily 60 tablet 11    budesonide-formoterol (SYMBICORT) 160-4.5 MCG/ACT Inhaler Inhale 2 puffs into the lungs 2 times daily 10.2 g 3    cetirizine (ZYRTEC) 10 MG tablet Take 1 tablet (10 mg) by mouth daily 30 tablet 1    deferasirox (JADENU) 360 MG tablet Take 4 tablets (1,440 mg) by mouth every evening 120 tablet 4    diphenhydrAMINE (BENADRYL) 25 MG capsule Take 1 capsule (25 mg) by mouth every 6 hours as needed for itching or allergies 30 capsule 0    EPINEPHrine (ANY BX GENERIC EQUIV) 0.3 MG/0.3ML injection 2-pack Inject 0.3 mLs (0.3 mg) into the muscle as needed for anaphylaxis (Patient not taking: Reported on 9/1/2023) 1 each 1    Hydroxyurea 1000 MG TABS Take 3,000 mg by mouth daily 90 tablet 3    naloxone (NARCAN) 4 MG/0.1ML nasal spray Spray 4 mg into one nostril alternating nostrils as needed for opioid reversal every 2-3 minutes until assistance arrives      ondansetron (ZOFRAN) 8 MG tablet Take 1 tablet (8 mg) by mouth every 8 hours as needed 30 tablet 1    oxyCODONE IR (ROXICODONE) 15 MG tablet Take 1 tablet (15 mg) by mouth every 4 hours as needed for severe pain Goal 4 per day. Max 6 per day. 10 tablet 0       Past Medical History  Past Medical History:   Diagnosis Date    Anxiety     Bleeding disorder (H)     Blood clotting disorder (H)     Cerebral infarction (H) 2015    Cognitive developmental delay     low IQ. Please recognize when managing pain and planning with her    Depressive disorder     Hemiplegia and hemiparesis following cerebral infarction  affecting right dominant side (H)     right hand contractures    Iron overload due to repeated red blood cell transfusions     Migraines     Multiple subsegmental pulmonary emboli without acute cor pulmonale (H) 02/01/2021    Oppositional defiant behavior     Presence of intrauterine contraceptive device 2/18/2020    Superficial venous thrombosis of arm, right 03/25/2021    Uncomplicated asthma      Past Surgical History:   Procedure Laterality Date    AS INSERT TUNNELED CV 2 CATH W/O PORT/PUMP      CHOLECYSTECTOMY      CV RIGHT HEART CATH MEASUREMENTS RECORDED N/A 11/18/2021    Procedure: Right Heart Cath;  Surgeon: Jackson Stauffer MD;  Location:  HEART CARDIAC CATH LAB    INSERT PORT VASCULAR ACCESS Left 4/21/2021    Procedure: INSERTION, VASCULAR ACCESS PORT (NOT SURE ON SIDE UNTIL REMOVAL);  Surgeon: Rajan More MD;  Location: [facility] OR    IR CHEST PORT PLACEMENT > 5 YRS OF AGE  4/21/2021    IR CVC NON TUNNEL LINE REMOVAL  5/6/2021    IR CVC NON TUNNEL PLACEMENT > 5 YRS  04/07/2020    IR CVC NON TUNNEL PLACEMENT > 5 YRS  4/30/2021    IR CVC NON TUNNEL PLACEMENT > 5 YRS  9/7/2022    IR PORT REMOVAL LEFT  4/21/2021    REMOVE PORT VASCULAR ACCESS Left 4/21/2021    Procedure: REMOVAL, VASCULAR ACCESS PORT LEFT;  Surgeon: Rajan More MD;  Location: [facility] OR    REPAIR TENDON ELBOW Right 10/02/2019    Procedure: Right Elbow Flexor Lengthening, Flexor Pronator Slide Of Wrist and Finger, Thumb Adductor Lengthening;  Surgeon: Anai Franco MD;  Location: [facility] OR    TONSILLECTOMY Bilateral 10/02/2019    Procedure: Bilateral Tonsillectomy;  Surgeon: Farhana Guy MD;  Location: [facility] OR    ZZC BREAST REDUCTION (INCLUDES LIPO) TIER 3 Bilateral 04/23/2019     Allergies   Allergen Reactions    Contrast Dye      Hives and breathing issues    Fish-Derived Products Hives    Seafood Hives    Gadolinium     Iodinated Contrast Media      Social History   Social History     Tobacco Use    Smoking status:  Never     Passive exposure: Never    Smokeless tobacco: Never   Substance Use Topics    Alcohol use: Not Currently     Alcohol/week: 0.0 standard drinks of alcohol    Drug use: Never    Still living at home with mom and extended family.     Past medical history and social history were reviewed.    Physical Examination:  /80 (BP Location: Left arm, Patient Position: Sitting, Cuff Size: Adult Regular)   Pulse 84   Temp 98.2  F (36.8  C) (Oral)   Resp 18   Wt 65.4 kg (144 lb 3.2 oz)   SpO2 99%   BMI 24.75 kg/m       Wt Readings from Last 10 Encounters:   09/08/23 65.4 kg (144 lb 3.2 oz)   08/11/23 65.5 kg (144 lb 4.8 oz)   07/14/23 65.2 kg (143 lb 12.8 oz)   07/10/23 65.3 kg (144 lb)   06/16/23 67 kg (147 lb 12.8 oz)   06/05/23 67.9 kg (149 lb 11.2 oz)   05/19/23 69.3 kg (152 lb 12.8 oz)   05/13/23 72.2 kg (159 lb 3.2 oz)   05/03/23 74.8 kg (164 lb 14.5 oz)   04/30/23 74.8 kg (165 lb)     General: Pleasant female, NAD  Eyes: EOMI, PERRL. Mild. scleral icterus.  Respiratory: CTA bilaterally, no wheezing.   Ext: No peripheral edema.  MSK: Contractured right arm and hand 2/2 stroke.  Neurologic: Grossly nonfocal. A/O x 4.  Skin: No rashes, petechiae, or bruising noted on exposed skin. L port accessed    Laboratory Data:  Most Recent 3 CBC's:  Recent Labs   Lab Test 09/08/23  1029 08/20/23  0826 08/19/23  0923 08/18/23  1934 08/18/23  1652 08/17/23  0739 08/16/23  0743   WBC  --  7.4 7.0  --  10.3 11.1* 17.9*   HGB 8.1* 6.6* 7.0*   < > 4.5* 6.2*  6.2* 6.2*   MCV 79 79 77*  --  77* 86 86   * 350 405  --  384 407 447   ANEUTAUTO  --   --   --   --  8.2 9.3* 15.1*    < > = values in this interval not displayed.    Most Recent 3 BMP's:  Recent Labs   Lab Test 08/19/23  0923 08/16/23  0743 08/16/23  0100   * 135* 134*   POTASSIUM 3.9 3.8 3.8   CHLORIDE 103 102 102   CO2 23 22 21*   BUN 7.7 6.6 5.2*   CR 0.48* 0.54 0.55   ANIONGAP 8 11 11   MICAH 8.6 8.6 8.8   * 94 90   PROTTOTAL 6.7 7.0 7.8    ALBUMIN 3.8 4.2 4.5    Most Recent 2 LFT's:  Recent Labs   Lab Test 08/19/23  0923 08/16/23  0743   AST 41 42   ALT 22 22   ALKPHOS 66 65   BILITOTAL 4.0* 5.7*      Lab Results   Component Value Date    RETP 8.1 (H) 09/08/2023      Assessment and Plan:  1. Sickle Cell HgbSS Disease  2. Recent hospitalization with acute chest, pulmonary edema (1/2023)  3. Chronic Pain  4. 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 History of CVA  7. Hearing loss    Jennifer is feeling better after her hospital admission in August for pain crisis. She notes a persistent dry cough since discharge which I suspect is related to asthma. Certainly increased exertion at work or environmental factors could be contributing. Her exam today is not concerning for infection. Her labs are stable with hgb back at her baseline at 8.1. No fever, leukocytosis or hypoxia.     She is agreeable to further decrease her oxycodone to 10 tablets per prescription. I congratulated her on her success with tapering opioids. She seems very motivated to continue although she has expressed concern in the past with having no opioid available for pain control at home. She is aware that this will be discussed further with Dr. Duncan at her visit next month. She has been successful in reducing her infusion center visits slightly. Her new job and variable infusion availability may be contributing to this. Nonetheless, it is forward progress and she is feeling great about it.    Desferal is currently on hold due to desire to stop/hold infusion due to decreased quality of life related to the amount of time she needed to be connected for infusion. She remains on Jadenu.  Unfortunately she missed her Ferriscan scheduled mid-August due to her hospital admission. I will request rescheduling today. At this time we will continue monthly sidney infusions on Friday. To date she has had 3 hospital admissions and 3 additional ED  visits which is much improved from previously.       Plan:  -Continue Hydrea to 3000mg daily to help lessen frequency of sickle cell pain, refilled today  -Continue monthly crizanlizumab infusions  -Decrease oxycodone to 10 tablets per prescription. She can continue the frequency at every 4 hours with a max of 6 tablets per day but with the decreased tablets per refill should aim for a max of 4 tablets per day. She can self-reduce infusion days/week and has had some success with this. We will continue to keep the cap at 2/week for now.  -Continue infusion center visits limited to two times per week (Mondays and Fridays ideally although still needs to call to request). Continue diligent home management with current medications, heat, rest, compression, warm baths.   -If unable to manage at home can go to ED but continue to not do IV narcotics in the emergency room. Ketamine previously added to pain plan in ED  - Desferal infusions on hold. Continue Jadenu. Reschedule Ferriscan (missed 8/16 MRI due to admission). Will likely need to resume infusions in the future.   -Continue aspirin BID  -RTC with Dr. Duncan in 1 month otherwise with me monthly coordinating with sidney infusions    75 minutes spent on the date of the encounter doing chart review, review of test results, interpretation of tests, patient visit, and documentation     Patricia Mantilla CNP

## 2023-09-08 NOTE — PATIENT INSTRUCTIONS
[facility] Triage and after hours / weekends / holidays:  [phone]    Please call the triage or after hours line if you experience a temperature greater than or equal to 100.4, shaking chills, have uncontrolled nausea, vomiting and/or diarrhea, dizziness, shortness of breath, chest pain, bleeding, unexplained bruising, or if you have any other new/concerning symptoms, questions or concerns.      If you are having any concerning symptoms or wish to speak to a provider before your next infusion visit, please call triage to notify them so we can adequately serve you.     If you need a refill on a narcotic prescription or other medication, please call before your infusion appointment.

## 2023-09-08 NOTE — LETTER
9/8/2023         RE: Jennifer Cervantes  [address]  [facility] [address]        Dear Colleague,    Thank you for referring your patient, Jennifer Cervantes, to the [facility]. Please see a copy of my visit note below.    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 She had multiple thromboembolic events in 2021 despite adherent anticoagulation use (though warfarin was perpetually low) and there are concerns for chronic thromboembolic disease but did not have pulmonary HTN on a November 2021 cath. She is maintained on chronic PO opioids and twice-weekly infusion visits (since 1/24/22) but has been able to be maintained on this regimen and has stayed out of the ED most of the time with even rarer admissions.     Interval History:  Jennifer is seen today for routine follow-up. She was admitted to [facility] for sickle cell pain crisis once since our last visit. During that admission her hemoglobin dropped as low as 4.4. She was transfused with 2 units PRBCs.     She had accepted a job at a local [employer] just prior to that hospitalization and was able to start once she was discharged. Work has been going very well. Her company and fellow employees have been very accommodating of her physical limitations. She is able to alternate sitting and standing at work which has been helpful in reducing pain. There have been a few weeks where she has only come in for infusions once. She is very proud of the fact that she  "hasn't had to miss work for an infusion visit.     She continues to be successful in tapering her oxycodone. She requests to reduced her total tablets per prescription to 10 today. She continues to fill this weekly consistently. Typically after work she takes a hot shower and rests if she is uncomfortable. She admits that previously she was using oxycodone when she wanted to rather than when she really needed to. She always takes Tylenol before determining whether she may need oxycodone.    She has been very happy with relationships in her personal life recently. She and her mom and getting along very well. Jennifer is hopeful to take her mom to get their nails done once she receives her first check. She also notes she and her boyfriend have been dating for almost a year. She has been very pleased with how supportive and encouraging he has been for her.    Sickle Cell Disease Comprehensive Checklist  Bone Health/Avascular Necrosis Screening/Symptoms (each visit): no new concerns today  Leg Ulcer evaluation (every visit): none  Hypertension (every visit):stable 3/10/23  Last pulmonary evaluation (asthma, AMAN, pulm HTN): 9/28/22  Stroke/silent cerebral infarct Hx (Y/N): Yes TIA ~2014, first event ~age 1 with full stroke and R sided weakness  Last PCP Visit: 3/6/23  Vaccines:  PCV13: 5/13/19  Pneumovax (PPSV23): 3/04, 10/09, 7/12/19 (next due 7/2024)  Menactra: 4/2010, 9/2015 (MCV done 8/16/21)  Influenza: 11/17/2022, due this fall  Audiology (chelation): done 6/2020, normal.. However, on 6/7, \"While there is no significant change in grade on the CTCAE 4.03 Scale, there were hearing changes bilaterally for both standard and extended high frequency audiometry.  Will need to determine if an ENT consult is advised due to the asymmetry in extended high frequency testing.\"     Plan last reviewed with patient: 7/11/22    Patient background: 23 yo F, enjoys movies and kids though there are times where she does not really want " to talk to people. Does not have a lot of social support at home.     Sickle Cell Disease History  Primary Hematologist Team: Jose Rafael Duncan  PCP: none  Genotype: SS  Acute Pain Crisis Treatment: (avoiding IV opioids for now, which she has agreed to)  ER   Oxycodone 15-20 mg x1  Ketamine 4mg IV x 2--helps with opioid sparing  Toradol 15 mg IV x1   Maintenance IV fluids with LR  Other: Zofran 8 mg IV PRN nausea  Inpatient:  Home oxycodone/Oxycontin regimen, though home oxycodone dosing could be increased to 20 mg to start  PCA plan:   None for now  Other Medications: Zofran  She has had success with ketamine starting at 4mg/h and advancing only to 6mg/h, as 8mg/h made her feel quite poorly..  ASA  Supportive Care: Docusate Senna  Chronic Pain Medications:    Home regimen Oxycontin 10 mg q12h, oxycodone 15 mg p.o. q.4-6 hours p.r.n. breakthrough pain.  She also continues on Voltaren gel, and Zoloft among other medications.    -Also benefits from mental health visits, acupuncture  Baseline Hemoglobin: 7 g/dl without chronic transfusions  Hydroxyurea use: Yes  H/O blood transfusions: Yes, several (iron overload) Most recent 11/20/2021  H/O Transfusion Reactions: no  Antibodies:none  H/O Acute Chest Syndrome: Yes  Last episode:9/05/22 (previously 4/26/21, 10/2019)   ICU/intubation: not with 9/2022 admission  H/O Stroke: Yes (managed with chronic transfusions in the past, stopped late Spring 2020)  H/O VTE: Yes (2/2021)  H/O Cholecystectomy or Splenectomy: no  H/O Asthma, Pulm HTN, AVN, Leg Ulcers, Nephropathy, Retinopathy, etc: Iron overload, asthma, chronic lung disease, physical limitations from early stroke    ---------------------------------------  Jennifer Cervantes's Goals (discussed today, 9/8/23)    1-3 month goal:  Continue to  hours at work     6 month goal:  Save money for back tattoo for her birthday    12 month goal:  Save money for her own place     Disease-specific goal(s):  Continue to wean oxycodone  down. Minimize infusion center visits.  ---------------------------------------      Current Outpatient Medications   Medication Sig Dispense Refill    acetaminophen (TYLENOL) 325 MG tablet Take 2 tablets (650 mg) by mouth every 6 hours as needed for mild pain 120 tablet 3    albuterol (PROAIR HFA/PROVENTIL HFA/VENTOLIN HFA) 108 (90 Base) MCG/ACT inhaler Inhale 2 puffs into the lungs every 6 hours as needed for shortness of breath or wheezing 8.5 g 3    albuterol (PROVENTIL) (2.5 MG/3ML) 0.083% neb solution Take 2 vials (5 mg) by nebulization every 6 hours as needed for shortness of breath or wheezing 90 mL 3    aspirin (ASA) 81 MG chewable tablet Take 1 tablet (81 mg) by mouth 2 times daily 60 tablet 11    budesonide-formoterol (SYMBICORT) 160-4.5 MCG/ACT Inhaler Inhale 2 puffs into the lungs 2 times daily 10.2 g 3    cetirizine (ZYRTEC) 10 MG tablet Take 1 tablet (10 mg) by mouth daily 30 tablet 1    deferasirox (JADENU) 360 MG tablet Take 4 tablets (1,440 mg) by mouth every evening 120 tablet 4    diphenhydrAMINE (BENADRYL) 25 MG capsule Take 1 capsule (25 mg) by mouth every 6 hours as needed for itching or allergies 30 capsule 0    EPINEPHrine (ANY BX GENERIC EQUIV) 0.3 MG/0.3ML injection 2-pack Inject 0.3 mLs (0.3 mg) into the muscle as needed for anaphylaxis (Patient not taking: Reported on 9/1/2023) 1 each 1    Hydroxyurea 1000 MG TABS Take 3,000 mg by mouth daily 90 tablet 3    naloxone (NARCAN) 4 MG/0.1ML nasal spray Spray 4 mg into one nostril alternating nostrils as needed for opioid reversal every 2-3 minutes until assistance arrives      ondansetron (ZOFRAN) 8 MG tablet Take 1 tablet (8 mg) by mouth every 8 hours as needed 30 tablet 1    oxyCODONE IR (ROXICODONE) 15 MG tablet Take 1 tablet (15 mg) by mouth every 4 hours as needed for severe pain Goal 4 per day. Max 6 per day. 10 tablet 0       Past Medical History  Past Medical History:   Diagnosis Date    Anxiety     Bleeding disorder (H)     Blood  clotting disorder (H)     Cerebral infarction (H) 2015    Cognitive developmental delay     low IQ. 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 side (H)     right hand contractures    Iron overload due to repeated red blood cell transfusions     Migraines     Multiple subsegmental pulmonary emboli without acute cor pulmonale (H) 02/01/2021    Oppositional defiant behavior     Presence of intrauterine contraceptive device 2/18/2020    Superficial venous thrombosis of arm, right 03/25/2021    Uncomplicated asthma      Past Surgical History:   Procedure Laterality Date    AS INSERT TUNNELED CV 2 CATH W/O PORT/PUMP      CHOLECYSTECTOMY      CV RIGHT HEART CATH MEASUREMENTS RECORDED N/A 11/18/2021    Procedure: Right Heart Cath;  Surgeon: Jackson Stauffer MD;  Location:  HEART CARDIAC CATH LAB    INSERT PORT VASCULAR ACCESS Left 4/21/2021    Procedure: INSERTION, VASCULAR ACCESS PORT (NOT SURE ON SIDE UNTIL REMOVAL);  Surgeon: Rajan More MD;  Location: [facility] OR    IR CHEST PORT PLACEMENT > 5 YRS OF AGE  4/21/2021    IR CVC NON TUNNEL LINE REMOVAL  5/6/2021    IR CVC NON TUNNEL PLACEMENT > 5 YRS  04/07/2020    IR CVC NON TUNNEL PLACEMENT > 5 YRS  4/30/2021    IR CVC NON TUNNEL PLACEMENT > 5 YRS  9/7/2022    IR PORT REMOVAL LEFT  4/21/2021    REMOVE PORT VASCULAR ACCESS Left 4/21/2021    Procedure: REMOVAL, VASCULAR ACCESS PORT LEFT;  Surgeon: Rajan More MD;  Location: [facility] OR    REPAIR TENDON ELBOW Right 10/02/2019    Procedure: Right Elbow Flexor Lengthening, Flexor Pronator Slide Of Wrist and Finger, Thumb Adductor Lengthening;  Surgeon: Anai Franco MD;  Location: [facility] OR    TONSILLECTOMY Bilateral 10/02/2019    Procedure: Bilateral Tonsillectomy;  Surgeon: Farhana Guy MD;  Location: [facility] OR    ZZC BREAST REDUCTION (INCLUDES LIPO) TIER 3 Bilateral 04/23/2019      Allergies   Allergen Reactions    Contrast Dye      Hives and breathing issues    Fish-Derived Products Hives    Seafood Hives    Gadolinium     Iodinated Contrast Media      Social History   Social History     Tobacco Use    Smoking status: Never     Passive exposure: Never    Smokeless tobacco: Never   Substance Use Topics    Alcohol use: Not Currently     Alcohol/week: 0.0 standard drinks of alcohol    Drug use: Never    Still living at home with mom and extended family.     Past medical history and social history were reviewed.    Physical Examination:  /80 (BP Location: Left arm, Patient Position: Sitting, Cuff Size: Adult Regular)   Pulse 84   Temp 98.2  F (36.8  C) (Oral)   Resp 18   Wt 65.4 kg (144 lb 3.2 oz)   SpO2 99%   BMI 24.75 kg/m       Wt Readings from Last 10 Encounters:   09/08/23 65.4 kg (144 lb 3.2 oz)   08/11/23 65.5 kg (144 lb 4.8 oz)   07/14/23 65.2 kg (143 lb 12.8 oz)   07/10/23 65.3 kg (144 lb)   06/16/23 67 kg (147 lb 12.8 oz)   06/05/23 67.9 kg (149 lb 11.2 oz)   05/19/23 69.3 kg (152 lb 12.8 oz)   05/13/23 72.2 kg (159 lb 3.2 oz)   05/03/23 74.8 kg (164 lb 14.5 oz)   04/30/23 74.8 kg (165 lb)     General: Pleasant female, NAD  Eyes: EOMI, PERRL. Mild. scleral icterus.  Respiratory: CTA bilaterally, no wheezing.   Ext: No peripheral edema.  MSK: Contractured right arm and hand 2/2 stroke.  Neurologic: Grossly nonfocal. A/O x 4.  Skin: No rashes, petechiae, or bruising noted on exposed skin. L port accessed    Laboratory Data:  Most Recent 3 CBC's:  Recent Labs   Lab Test 09/08/23  1029 08/20/23  0826 08/19/23  0923 08/18/23  1934 08/18/23  1652 08/17/23  0739 08/16/23  0743   WBC  --  7.4 7.0  --  10.3 11.1* 17.9*   HGB 8.1* 6.6* 7.0*   < > 4.5* 6.2*  6.2* 6.2*   MCV 79 79 77*  --  77* 86 86   * 350 405  --  384 407 447   ANEUTAUTO  --   --   --   --  8.2 9.3* 15.1*    < > = values in this interval not displayed.    Most Recent 3 BMP's:  Recent Labs   Lab Test  08/19/23  0923 08/16/23  0743 08/16/23  0100   * 135* 134*   POTASSIUM 3.9 3.8 3.8   CHLORIDE 103 102 102   CO2 23 22 21*   BUN 7.7 6.6 5.2*   CR 0.48* 0.54 0.55   ANIONGAP 8 11 11   MICAH 8.6 8.6 8.8   * 94 90   PROTTOTAL 6.7 7.0 7.8   ALBUMIN 3.8 4.2 4.5    Most Recent 2 LFT's:  Recent Labs   Lab Test 08/19/23  0923 08/16/23  0743   AST 41 42   ALT 22 22   ALKPHOS 66 65   BILITOTAL 4.0* 5.7*      Lab Results   Component Value Date    RETP 8.1 (H) 09/08/2023      Assessment and Plan:  1. Sickle Cell HgbSS Disease  2. Recent hospitalization with acute chest, pulmonary edema (1/2023)  3. Chronic Pain  4. 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 History of CVA  7. Hearing loss    Jennifer is feeling better after her hospital admission in August for pain crisis. She notes a persistent dry cough since discharge which I suspect is related to asthma. Certainly increased exertion at work or environmental factors could be contributing. Her exam today is not concerning for infection. Her labs are stable with hgb back at her baseline at 8.1. No fever, leukocytosis or hypoxia.     She is agreeable to further decrease her oxycodone to 10 tablets per prescription. I congratulated her on her success with tapering opioids. She seems very motivated to continue although she has expressed concern in the past with having no opioid available for pain control at home. She is aware that this will be discussed further with Dr. Duncan at her visit next month. She has been successful in reducing her infusion center visits slightly. Her new job and variable infusion availability may be contributing to this. Nonetheless, it is forward progress and she is feeling great about it.    Desferal is currently on hold due to desire to stop/hold infusion due to decreased quality of life related to the amount of time she needed to be connected for infusion. She remains on Jadenu.   Unfortunately she missed her Ferriscan scheduled mid-August due to her hospital admission. I will request rescheduling today. At this time we will continue monthly sidney infusions on Friday. To date she has had 3 hospital admissions and 3 additional ED visits which is much improved from previously.       Plan:  -Continue Hydrea to 3000mg daily to help lessen frequency of sickle cell pain, refilled today  -Continue monthly crizanlizumab infusions  -Decrease oxycodone to 10 tablets per prescription. She can continue the frequency at every 4 hours with a max of 6 tablets per day but with the decreased tablets per refill should aim for a max of 4 tablets per day. She can self-reduce infusion days/week and has had some success with this. We will continue to keep the cap at 2/week for now.  -Continue infusion center visits limited to two times per week (Mondays and Fridays ideally although still needs to call to request). Continue diligent home management with current medications, heat, rest, compression, warm baths.   -If unable to manage at home can go to ED but continue to not do IV narcotics in the emergency room. Ketamine previously added to pain plan in ED  - Desferal infusions on hold. Continue Jadenu. Reschedule Ferriscan (missed 8/16 MRI due to admission). Will likely need to resume infusions in the future.   -Continue aspirin BID  -RTC with Dr. Duncan in 1 month otherwise with me monthly coordinating with sidney infusions    75 minutes spent on the date of the encounter doing chart review, review of test results, interpretation of tests, patient visit, and documentation     Patricia Mantilla CNP

## 2023-09-08 NOTE — TELEPHONE ENCOUNTER
Oncology Nurse Triage - Sickle Cell Pain Crisis:  Situation: Jennifer  calling about Sickle Cell Pain Crisis, requesting to add on IV fluids and pain medicine to [facility].    Background:   Patient's last infusion was 9/6/23  Last clinic visit date:8/11/23 with Patricia Mantilla CNP  Next clinic visit date: today w/Patricia Mantilla CNP  Does patient have active treatment plan?  Yes    Assessment of Symptoms:  Onset/Duration of symptoms: 1 day    Is it typical sickle cell pain? Yes   Location: generalized  Character: Sharp           Intensity: 8/10    Any radiation of pain, numbness, tingling, weakness, warmth, swelling, discoloration of arms or legs?No   Fever?No  Chest Pain Present: No   Shortness of breath: No     Other home therapies tried: HEAT/HEATING PAD and WARM BATH   Last home medication taken and when: oxycodone at 0600    Any Refills Needed?: Yes Will discuss with Patricia today at apt. Would like oxycodone pended up now. Pended in separate refill encounter.    Does patient have transportation & length of time to get to clinic: Yes     Recommendations:  0722: Per Charge Nurse: okay to add on IVF/pm if okay'd by provider.  0731: Secure chat sent to Patricia Mantilla asking if okay to add on IVF/pm to [facility] today.  0746: Approved by Patricia Mantilla for adding on IVF/pm to [facility]  Updated Infusion charge nurse. Updated apt notes.  0800: LVM for Jennifer updating her about approval for add on of IVF/pm to [facility]    Routed to Care Team.

## 2023-09-08 NOTE — TELEPHONE ENCOUNTER
PA Initiation    Medication: DEFERASIROX 360 MG PO TABS  Insurance Company: [employer] - Phone [phone] Fax [phone]  Pharmacy Filling the Rx: [facility] [place], [place] - [address] [place] [address] [phone]  Filling Pharmacy Phone:    Filling Pharmacy Fax:    Start Date: 9/8/2023          Thank you,    Carole Ritchie  Oncology Pharmacy Liaison KARAN [email or web address]  Phone: [phone]  Fax: [phone]

## 2023-09-08 NOTE — NURSING NOTE
"Oncology Rooming Note    September 8, 2023 10:37 AM   Jennifer Cervantes is a 23 year old female who presents for:    Chief Complaint   Patient presents with    Port Draw     Labs drawn via port by RN. VS taken.    Oncology Clinic Visit     Sickle cell pain crisis      Initial Vitals: /80 (BP Location: Left arm, Patient Position: Sitting, Cuff Size: Adult Regular)   Pulse 84   Temp 98.2  F (36.8  C) (Oral)   Resp 18   Wt 65.4 kg (144 lb 3.2 oz)   SpO2 99%   BMI 24.75 kg/m   Estimated body mass index is 24.75 kg/m  as calculated from the following:    Height as of 5/10/23: 1.626 m (5' 4\").    Weight as of this encounter: 65.4 kg (144 lb 3.2 oz). Body surface area is 1.72 meters squared.  Extreme Pain (9) Comment: Data Unavailable   No LMP recorded. Patient has had an implant.  Allergies reviewed: Yes  Medications reviewed: Yes    Medications: MEDICATION REFILLS NEEDED TODAY. Provider was notified.  Pharmacy name entered into [facility]: [facility] [place], [place] - [address] [place] [address] [phone]    Clinical concerns: Pt needs refills for:   oxyCODONE (15 MG tablet)   Hydroxyurea (1000 MG tablet)   Aspirin (81 MG chewable tablet)   Deferasirox (JADENU) 360 MG tablet       Stacie Joseph              "

## 2023-09-08 NOTE — NURSING NOTE
"Chief Complaint   Patient presents with    Port Draw     Labs drawn via port by RN. VS taken.     Port accessed with 20 gauge, 3/4\" power needle by RN, labs collected, line flushed with saline and heparin.  Vitals taken. Pt checked in for appointment(s).     Ashley Robles RN    "

## 2023-09-08 NOTE — PROGRESS NOTES
Infusion Nursing Note:  Jennifer Cervantes presents today for Jr, IVFs and pain meds.    Patient seen by provider today: Yes: Patricia Mantilla CNP 9/8/23   present during visit today: Not Applicable.    Note: Patient presents to the infusion center today after her provider appt. She is in 10/10 generalized pain today.    6/10 after 1st dose  4/10 after 2nd dose  3/10 after 3rd dose    After three doses of IV pain meds she reports her pain is tolerable enough to go home. Patient states she has a ride taking her home today.    Intravenous Access:  Implanted Port.    Treatment Conditions:   Latest Reference Range & Units 09/08/23 10:29   Sodium 136 - 145 mmol/L 139   Potassium 3.4 - 5.3 mmol/L 4.1   Chloride 98 - 107 mmol/L 107   Carbon Dioxide (CO2) 22 - 29 mmol/L 22   Urea Nitrogen 6.0 - 20.0 mg/dL 17.1   Creatinine 0.51 - 0.95 mg/dL 0.56   GFR Estimate >60 mL/min/1.73m2 >90   Calcium 8.6 - 10.0 mg/dL 9.1   Anion Gap 7 - 15 mmol/L 10   Ferritin 6 - 175 ng/mL 3,928 (H)   Glucose 70 - 99 mg/dL 111 (H)   WBC 4.0 - 11.0 10e3/uL 9.2   Hemoglobin 11.7 - 15.7 g/dL 8.1 (L)   Hematocrit 35.0 - 47.0 % 24.0 (L)   Platelet Count 150 - 450 10e3/uL 520 (H)   RBC Count 3.80 - 5.20 10e6/uL 3.03 (L)   MCV 78 - 100 fL 79   MCH 26.5 - 33.0 pg 26.7   MCHC 31.5 - 36.5 g/dL 33.8   RDW 10.0 - 15.0 % 23.0 (H)   % Neutrophils % 74   % Lymphocytes % 17   % Monocytes % 4   % Eosinophils % 4   % Basophils % 1   Absolute Basophils 0.0 - 0.2 10e3/uL 0.1   NRBC/W <=0 % 24 (H)   Absolute Neutrophil 1.6 - 8.3 10e3/uL 6.8   Absolute Lymphocytes 0.8 - 5.3 10e3/uL 1.6   Absolute Monocytes 0.0 - 1.3 10e3/uL 0.4   Absolute Eosinophils 0.0 - 0.7 10e3/uL 0.4   Absolute NRBCs <=0.0 10e3/uL 2.2 (H)   RBC Morphology  Confirmed RBC Indices   Platelet Morphology Automated Count Confirmed. Platelet morphology is normal.  Automated Count Confirmed. Platelet morphology is normal.   Sickle Cells None Seen  Slight !   Target Cells None Seen  Marked !   % Retic  0.5 - 2.0 % 8.1 (H)   Absolute Retic 0.025 - 0.095 10e6/uL 0.245 (H)     Biological Infusion Checklist:  ~~~ NOTE: If the patient answers yes to any of the questions below, hold the infusion and contact ordering provider or on-call provider.    Have you recently had an elevated temperature, fever, chills, productive cough, coughing for 3 weeks or longer or hemoptysis,  abnormal vital signs, night sweats,  chest pain or have you noticed a decrease in your appetite, unexplained weight loss or fatigue? Yes, Court Yadav CNP made aware of intermittent cough during her provider appt today.   Do you have any open wounds or new incisions? No  Do you have any upcoming hospitalizations or surgeries? Does not include esophagogastroduodenoscopy, colonoscopy, endoscopic retrograde cholangiopancreatography (ERCP), endoscopic ultrasound (EUS), dental procedures or joint aspiration/steroid injections No  Do you currently have any signs of illness or infection or are you on any antibiotics? No  Have you had any new, sudden or worsening abdominal pain? No  Have you or anyone in your household received a live vaccination in the past 4 weeks? Please note: No live vaccines while on biologic/chemotherapy until 6 months after the last treatment. Patient can receive the flu vaccine (shot only), pneumovax and the Covid vaccine. It is optimal for the patient to get these vaccines mid cycle, but they can be given at any time as long as it is not on the day of the infusion. No  Have you recently been diagnosed with any new nervous system diseases (ie. Multiple sclerosis, Guillain Lykens, seizures, neurological changes) or cancer diagnosis? Are you on any form of radiation or chemotherapy? No  Are you pregnant or breast feeding or do you have plans of pregnancy in the future? No  Have you been having any signs of worsening depression or suicidal ideations?  (benlysta only) No  Have there been any other new onset medical symptoms? No  Have  you had any new blood clots? (IVIG only) n/a  Results reviewed, labs MET treatment parameters, ok to proceed with treatment.    Post Infusion Assessment:  Patient tolerated infusion without incident.  Patient observed for 30 minutes post Jr per protocol.  Blood return noted pre and post infusion.  No evidence of extravasations.  Access discontinued per protocol.     Discharge Plan:   Prescription refills given for oxycodone, ASA, Jadenu and Hydrea.  Discharge instructions reviewed with: Patient.  Patient and/or family verbalized understanding of discharge instructions and all questions answered.  AVS to patient via TDXT.  Patient will return 10/6 for next appointment.   Patient discharged in stable condition accompanied by: self.  Departure Mode: Ambulatory.      Nichole Vasquez RN

## 2023-09-11 ENCOUNTER — NURSE TRIAGE (OUTPATIENT)
Dept: ONCOLOGY | Facility: CLINIC | Age: 23
End: 2023-09-11
Payer: COMMERCIAL

## 2023-09-11 ENCOUNTER — INFUSION THERAPY VISIT (OUTPATIENT)
Dept: ONCOLOGY | Facility: CLINIC | Age: 23
End: 2023-09-11
Attending: PEDIATRICS
Payer: COMMERCIAL

## 2023-09-11 ENCOUNTER — PATIENT OUTREACH (OUTPATIENT)
Dept: CARE COORDINATION | Facility: CLINIC | Age: 23
End: 2023-09-11
Payer: COMMERCIAL

## 2023-09-11 VITALS
TEMPERATURE: 98.1 F | SYSTOLIC BLOOD PRESSURE: 119 MMHG | OXYGEN SATURATION: 99 % | DIASTOLIC BLOOD PRESSURE: 83 MMHG | RESPIRATION RATE: 18 BRPM | HEART RATE: 82 BPM

## 2023-09-11 DIAGNOSIS — D57.00 SICKLE CELL PAIN CRISIS (H): Primary | ICD-10-CM

## 2023-09-11 DIAGNOSIS — G81.10 SPASTIC HEMIPLEGIA, UNSPECIFIED ETIOLOGY, UNSPECIFIED LATERALITY (H): ICD-10-CM

## 2023-09-11 PROCEDURE — 258N000003 HC RX IP 258 OP 636: Performed by: PEDIATRICS

## 2023-09-11 PROCEDURE — 96361 HYDRATE IV INFUSION ADD-ON: CPT

## 2023-09-11 PROCEDURE — 250N000011 HC RX IP 250 OP 636: Mod: JZ | Performed by: PEDIATRICS

## 2023-09-11 PROCEDURE — 96374 THER/PROPH/DIAG INJ IV PUSH: CPT

## 2023-09-11 PROCEDURE — 250N000013 HC RX MED GY IP 250 OP 250 PS 637: Performed by: PEDIATRICS

## 2023-09-11 PROCEDURE — 96376 TX/PRO/DX INJ SAME DRUG ADON: CPT

## 2023-09-11 RX ORDER — HEPARIN SODIUM,PORCINE 10 UNIT/ML
5 VIAL (ML) INTRAVENOUS
Status: CANCELLED | OUTPATIENT
Start: 2023-10-01

## 2023-09-11 RX ORDER — DIPHENHYDRAMINE HCL 25 MG
25 CAPSULE ORAL
Status: COMPLETED | OUTPATIENT
Start: 2023-09-11 | End: 2023-09-11

## 2023-09-11 RX ORDER — ONDANSETRON 4 MG/1
8 TABLET, ORALLY DISINTEGRATING ORAL
Status: COMPLETED | OUTPATIENT
Start: 2023-09-11 | End: 2023-09-11

## 2023-09-11 RX ORDER — ONDANSETRON 4 MG/1
8 TABLET, FILM COATED ORAL
Status: CANCELLED
Start: 2023-10-01

## 2023-09-11 RX ORDER — DIPHENHYDRAMINE HCL 25 MG
25 CAPSULE ORAL
Status: CANCELLED
Start: 2023-10-01

## 2023-09-11 RX ORDER — HEPARIN SODIUM (PORCINE) LOCK FLUSH IV SOLN 100 UNIT/ML 100 UNIT/ML
5 SOLUTION INTRAVENOUS
Status: DISCONTINUED | OUTPATIENT
Start: 2023-09-11 | End: 2023-09-11 | Stop reason: HOSPADM

## 2023-09-11 RX ORDER — HEPARIN SODIUM (PORCINE) LOCK FLUSH IV SOLN 100 UNIT/ML 100 UNIT/ML
5 SOLUTION INTRAVENOUS
Status: CANCELLED | OUTPATIENT
Start: 2023-10-01

## 2023-09-11 RX ADMIN — HYDROMORPHONE HYDROCHLORIDE 1 MG: 1 INJECTION, SOLUTION INTRAMUSCULAR; INTRAVENOUS; SUBCUTANEOUS at 12:22

## 2023-09-11 RX ADMIN — DIPHENHYDRAMINE HYDROCHLORIDE 25 MG: 25 CAPSULE ORAL at 11:29

## 2023-09-11 RX ADMIN — ONDANSETRON 8 MG: 4 TABLET, ORALLY DISINTEGRATING ORAL at 11:29

## 2023-09-11 RX ADMIN — Medication 5 ML: at 14:00

## 2023-09-11 RX ADMIN — SODIUM CHLORIDE, POTASSIUM CHLORIDE, SODIUM LACTATE AND CALCIUM CHLORIDE 1000 ML: 600; 310; 30; 20 INJECTION, SOLUTION INTRAVENOUS at 11:24

## 2023-09-11 RX ADMIN — HYDROMORPHONE HYDROCHLORIDE 1 MG: 1 INJECTION, SOLUTION INTRAMUSCULAR; INTRAVENOUS; SUBCUTANEOUS at 11:25

## 2023-09-11 RX ADMIN — HYDROMORPHONE HYDROCHLORIDE 1 MG: 1 INJECTION, SOLUTION INTRAMUSCULAR; INTRAVENOUS; SUBCUTANEOUS at 13:21

## 2023-09-11 NOTE — PATIENT INSTRUCTIONS
[facility] Triage and after hours / weekends / holidays:  [phone]    Please call the triage or after hours line if you experience a temperature greater than or equal to 100.4, shaking chills, have uncontrolled nausea, vomiting and/or diarrhea, dizziness, shortness of breath, chest pain, bleeding, unexplained bruising, or if you have any other new/concerning symptoms, questions or concerns.      If you are having any concerning symptoms or wish to speak to a provider before your next infusion visit, please call triage to notify them so we can adequately serve you.     If you need a refill on a narcotic prescription or other medication, please call before your infusion appointment.           September 2023 Sunday Monday Tuesday Wednesday Thursday Friday Saturday                            1    BMT INFUSION 2 HR (120 MIN)   8:30 AM   (120 min.)    BMT INFUSION NURSE   [facility] 2       3     4     5     6    BMT INFUSION 4 HR (240 MIN)  12:30 PM   (240 min.)    BMT INFUSION NURSE   [facility] 7     8    LAB CENTRAL  10:00 AM   (15 min.)   [facility]   [facility]    RETURN ACTIVE TREATMENT  10:15 AM   (45 min.)   Patricia Mantilla CNP   [facility]    ONC INFUSION 4 HR (240 MIN)  12:00 PM   (240 min.)    ONC INFUSION NURSE   [facility] 9       10     11    ONC INFUSION 3 HR (180 MIN)  11:30 AM   (180 min.)    ONC INFUSION NURSE   [facility] 12     13     14     15     16       17     18     19     20     21     22     23       24     25     26     27     28     29     30 October 2023 Sunday Monday Tuesday Wednesday Thursday Friday Saturday   1     2    LAB CENTRAL  11:30 AM   (15 min.)    [facility]   [facility]  Clinic    RETURN CCSL  11:45 AM   (30 min.)   Eric Duncan MD   [facility] 3     4     5     6    LAB CENTRAL  10:30 AM   (15 min.)   [facility] LAB DRAW   [facility]    ONC INFUSION 4 HR (240 MIN)  11:00 AM   (240 min.)    ONC INFUSION NURSE   [facility] 7       8     9     10     11     12    NEUROTOXIN   8:45 AM   (60 min.)   Katherine Pierce MD   [facility] 13     14       15     16     17     18     19     20     21       22     23     24     25     26     27     28       29     30     31                                      No results found for this or any previous visit (from the past 24 hour(s)).

## 2023-09-11 NOTE — TELEPHONE ENCOUNTER
Oncology Nurse Triage - Sickle Cell Pain Crisis:    Situation: Jennifer  calling about Sickle Cell Pain Crisis, requesting to be added on for IV fluids and pain medicine    Background:   Patient's last infusion was 9/8/23  Last clinic visit date:9/8/23  Does patient have active treatment plan?  Yes    Assessment of Symptoms:  Onset/Duration of symptoms: 3 days    Is it typical sickle cell pain? Yes States it started after sidney infusion which is pretty typical for post-sidney infusion.  Location: low back and bilateral legs  Character: Sharp and continuous           Intensity: 9/10  Any radiation of pain, numbness, tingling, weakness, warmth, swelling, discoloration of arms or legs?No   Fever?No   Chest Pain Present: No   Shortness of breath: No     Other home therapies tried: HEAT/HEATING PAD and WARM BATH   Last home medication taken and when: 0600 oxycodone  Any Refills Needed?: No     Does patient have transportation & length of time to get to clinic: No 20-25 minutes away    Recommendations:   If you do not hear from the infusion center by 2pm then you will not be able to get in for an infusion today. If symptoms worsen while waiting for call back, and/or you experience fever, chills, SOB, chest pain, cough, n/v, dizziness, numbness, swelling, discoloration of extremities, then seek emergency evaluation in Emergency Department.   Pt voiced understanding.    Added to infusion wait list.    0714: Ange can offer apt at 1130.  0715: called Jennifer who confirmed she can come to apt.  0717: Updated Infusion Charge Nurse, sent message to [facility] to schedule, and sent message to  to coordinate transportation.    Message routed to Care Team.

## 2023-09-11 NOTE — PROGRESS NOTES
Social Work - Transportation  [facility]    Data/Intervention:  Patient Name: Jennifer Cervantes Goes By: Jennifer    /Age: 2000 (23 year old)    Referral From: Ange Abbasi RN  Reason for Referral:  support requested for patient transportation needs for IVF/Pain Meds at 11:30am.  Assessment:  called [employer] to arrange ride through patient's insurance. [employer] arranged  for patient from home with [employer]. Patient will need to call [phone] when ready for return ride home.  Plan: Patient is aware of the transportation plan.  available to assist with any other needs.  CARLOS Chavez,[facility]  Hematology/Oncology Social Worker  Phone:[phone] Pager: [phone]

## 2023-09-11 NOTE — TELEPHONE ENCOUNTER
Prior Authorization Approval    Medication: DEFERASIROX 360 MG PO TABS  Authorization Effective Date: 8/9/2023  Authorization Expiration Date: 9/9/2024  Approved Dose/Quantity: 120 per 30 DS  Reference #: [id number]   Insurance Company: [employer] - Phone [phone] Fax [phone]  Expected CoPay:       CoPay Card Available:      Financial Assistance Needed:   Which Pharmacy is filling the prescription: [facility] [address] 0-943  Pharmacy Notified: Yes  Patient Notified: Yes        Thank you,    Carole Ritchie  Oncology Pharmacy Liaison II  [email or web address]  Phone: [phone]  Fax: [phone]

## 2023-09-11 NOTE — PROGRESS NOTES
Infusion Nursing Note:  Jennifer Cervantes presents today for IV fluids and pain meds.    Patient seen by provider today: No   present during visit today: Not Applicable.    Note: Patient reports pain 9/10 in her legs and back today. States it started over the weekend following her cirz infusion on Friday 9/8. Dilaudid 1mg IV given x3 with pain down to 4/10 prior to discharge    Patient denies any fevers or chills at home. Has an ongoing cough which she reports is unchanged since her visit with Patricia Mantilla a few days ago.      Intravenous Access:  Implanted Port accessed in infusion    Treatment Conditions:  Not Applicable.      Post Infusion Assessment:  Patient tolerated infusion without incident.  Blood return noted pre and post infusion.  Site patent and intact, free from redness, edema or discomfort.  No evidence of extravasations.  Access discontinued per protocol.       Discharge Plan:   Patient declined prescription refills.  Discharge instructions reviewed with: Patient.  Patient and/or family verbalized understanding of discharge instructions and all questions answered.  AVS to patient via [employer].  Patient will return 10/2/23 for next MD appointment.   Patient discharged in stable condition accompanied by: self.  Departure Mode: Ambulatory.      Melissa Oliver RN

## 2023-09-15 ENCOUNTER — NURSE TRIAGE (OUTPATIENT)
Dept: ONCOLOGY | Facility: CLINIC | Age: 23
End: 2023-09-15
Payer: COMMERCIAL

## 2023-09-15 ENCOUNTER — INFUSION THERAPY VISIT (OUTPATIENT)
Dept: INFUSION THERAPY | Facility: CLINIC | Age: 23
End: 2023-09-15
Attending: PEDIATRICS
Payer: COMMERCIAL

## 2023-09-15 ENCOUNTER — PATIENT OUTREACH (OUTPATIENT)
Dept: CARE COORDINATION | Facility: CLINIC | Age: 23
End: 2023-09-15
Payer: COMMERCIAL

## 2023-09-15 VITALS
HEART RATE: 76 BPM | RESPIRATION RATE: 16 BRPM | SYSTOLIC BLOOD PRESSURE: 113 MMHG | OXYGEN SATURATION: 97 % | DIASTOLIC BLOOD PRESSURE: 71 MMHG | TEMPERATURE: 98 F

## 2023-09-15 DIAGNOSIS — D57.00 SICKLE CELL PAIN CRISIS (H): Primary | ICD-10-CM

## 2023-09-15 DIAGNOSIS — G81.10 SPASTIC HEMIPLEGIA, UNSPECIFIED ETIOLOGY, UNSPECIFIED LATERALITY (H): ICD-10-CM

## 2023-09-15 DIAGNOSIS — D57.00 SICKLE CELL PAIN CRISIS (H): ICD-10-CM

## 2023-09-15 PROCEDURE — 96361 HYDRATE IV INFUSION ADD-ON: CPT

## 2023-09-15 PROCEDURE — 250N000011 HC RX IP 250 OP 636: Mod: JZ | Performed by: PEDIATRICS

## 2023-09-15 PROCEDURE — 96376 TX/PRO/DX INJ SAME DRUG ADON: CPT

## 2023-09-15 PROCEDURE — 96374 THER/PROPH/DIAG INJ IV PUSH: CPT

## 2023-09-15 PROCEDURE — 258N000003 HC RX IP 258 OP 636: Performed by: PEDIATRICS

## 2023-09-15 PROCEDURE — 250N000013 HC RX MED GY IP 250 OP 250 PS 637: Performed by: PEDIATRICS

## 2023-09-15 RX ORDER — HEPARIN SODIUM (PORCINE) LOCK FLUSH IV SOLN 100 UNIT/ML 100 UNIT/ML
5 SOLUTION INTRAVENOUS
Status: CANCELLED | OUTPATIENT
Start: 2023-10-01

## 2023-09-15 RX ORDER — DIPHENHYDRAMINE HCL 25 MG
25 CAPSULE ORAL
Status: CANCELLED
Start: 2023-10-01

## 2023-09-15 RX ORDER — ONDANSETRON 8 MG/1
8 TABLET, ORALLY DISINTEGRATING ORAL
Status: COMPLETED | OUTPATIENT
Start: 2023-09-15 | End: 2023-09-15

## 2023-09-15 RX ORDER — OXYCODONE HYDROCHLORIDE 15 MG/1
15 TABLET ORAL EVERY 4 HOURS PRN
Qty: 10 TABLET | Refills: 0 | Status: SHIPPED | OUTPATIENT
Start: 2023-09-15 | End: 2023-09-22

## 2023-09-15 RX ORDER — HEPARIN SODIUM,PORCINE 10 UNIT/ML
5 VIAL (ML) INTRAVENOUS
Status: CANCELLED | OUTPATIENT
Start: 2023-10-01

## 2023-09-15 RX ORDER — ONDANSETRON 4 MG/1
8 TABLET, FILM COATED ORAL
Status: CANCELLED
Start: 2023-10-01

## 2023-09-15 RX ORDER — DIPHENHYDRAMINE HCL 25 MG
25 CAPSULE ORAL
Status: COMPLETED | OUTPATIENT
Start: 2023-09-15 | End: 2023-09-15

## 2023-09-15 RX ORDER — HEPARIN SODIUM (PORCINE) LOCK FLUSH IV SOLN 100 UNIT/ML 100 UNIT/ML
5 SOLUTION INTRAVENOUS
Status: DISCONTINUED | OUTPATIENT
Start: 2023-09-15 | End: 2023-09-16 | Stop reason: HOSPADM

## 2023-09-15 RX ADMIN — DIPHENHYDRAMINE HYDROCHLORIDE 25 MG: 25 CAPSULE ORAL at 10:55

## 2023-09-15 RX ADMIN — HYDROMORPHONE HYDROCHLORIDE 1 MG: 1 INJECTION, SOLUTION INTRAMUSCULAR; INTRAVENOUS; SUBCUTANEOUS at 12:59

## 2023-09-15 RX ADMIN — HYDROMORPHONE HYDROCHLORIDE 1 MG: 1 INJECTION, SOLUTION INTRAMUSCULAR; INTRAVENOUS; SUBCUTANEOUS at 10:46

## 2023-09-15 RX ADMIN — Medication 5 ML: at 13:02

## 2023-09-15 RX ADMIN — ONDANSETRON 8 MG: 8 TABLET, ORALLY DISINTEGRATING ORAL at 10:55

## 2023-09-15 RX ADMIN — SODIUM CHLORIDE, POTASSIUM CHLORIDE, SODIUM LACTATE AND CALCIUM CHLORIDE 1000 ML: 600; 310; 30; 20 INJECTION, SOLUTION INTRAVENOUS at 10:43

## 2023-09-15 RX ADMIN — HYDROMORPHONE HYDROCHLORIDE 1 MG: 1 INJECTION, SOLUTION INTRAMUSCULAR; INTRAVENOUS; SUBCUTANEOUS at 11:46

## 2023-09-15 NOTE — TELEPHONE ENCOUNTER
Oncology Nurse Triage - Sickle Cell Pain Crisis:  Situation: Jennifer  calling about Sickle Cell Pain Crisis, requesting to be added on for IV fluids and pain medicine    Background:   Patient's last infusion was 9/11/23  Last clinic visit date: 9/8/23 w/ Patricia Lunaaislinn  Does patient have active treatment plan?  Yes    Assessment of Symptoms:  Onset/Duration of symptoms: 2 day    Is it typical sickle cell pain? Yes   Location: generalized  Character: Sharp           Intensity: 8/10    Any radiation of pain, numbness, tingling, weakness, warmth, swelling, discoloration of arms or legs?No     Fever?No     Chest Pain Present: No     Shortness of breath: No     Other home therapies tried: HEAT/HEATING PAD     Last home medication taken and when: Oxycodone at 6am    Any Refills Needed?: Yes -Oxycodone    Does patient have transportation & length of time to get to clinic: No-  pt states she will need transportation, but if early slot opens up she can find a way to clinic.    Recommendations:   Added to infusion wait list    Per Roz Arellano [facility] infusion charge nurse, can take pt for infusion. Call to pt, who states she can be to infusion by 10am.   0857 message sent to scheduling.    If you do not hear from the infusion center by 2pm then you will not be able to get in for an infusion today. If symptoms worsen while waiting for call back, and/or you experience fever, chills, SOB, chest pain, cough, n/v, dizziness, numbness, swelling, discoloration of extremities, then seek emergency evaluation in Emergency Department.     Please note, if you are late for your appt, you risk losing your infusion appt as it may delay another patient's infusion who arrived on time.

## 2023-09-15 NOTE — PROGRESS NOTES
Social Work - Transportation  [facility]    Data/Intervention:  Patient Name: Jennifer Cervantes Goes By: Jennifer    /Age: 2000 (23 year old)    Referral From: care team  Reason for Referral:  support requested for patient transportation needs for return ride home from today's appointment.  Assessment:  called [employer] to arrange ride due to not being able to arrange through patient's insurance. [employer] will  for patient from [address] [place] [address] To return patient home when infusion appointment is finished. Ride is set as a will call. Patient will need to call [phone] when ready for return ride home.  Plan: Patient is aware of the transportation plan.  available to assist with any other needs.    Corry GONZALEZ, [facility]  - Oncology  Phone : [phone]  Pager: [phone]

## 2023-09-15 NOTE — PATIENT INSTRUCTIONS
Dear Jennifer Cervantes    Thank you for choosing [facility] ([facility]) for your infusion.  The following information is a summary of our appointment as well as important reminders.          If you are a transplant patient and require transplant education, please click on this link: [email or web address].    We look forward in seeing you on your next appointment here at [facility] ([facility]).  Please don t hesitate to call us at [phone] to reschedule any of your appointments or to speak with one of the [facility] registered nurses.  It was a pleasure taking care of you today.    Sincerely,    [facility]  [facility]  [address] [place] [address]  [id number]  Phone:  [phone]

## 2023-09-15 NOTE — TELEPHONE ENCOUNTER
Pt called to report she is in the car and on her way, says arrival time of 1009. Message sent to Roz Arellano, charge nurse, to update at 0920.  Pt states she will need transportation set up for after infusion to return home.   1002 message sent to [employer].

## 2023-09-15 NOTE — TELEPHONE ENCOUNTER
Narcotic Refill Request  Medication(s) requested:  Oxycodone 15mg tab  Person Requesting Refill: Jennifer  What pain is the medication treating: sickle cell pain  How is the medication being taken?:1 tab q4h PRN  Does pt have enough for today? She will be out this afternoon  Is pain being adequately controlled on the current regimen?: yes  Experiencing any side effects from medication?: no    Date of most recent appointment:  9/8/23  Any No Show Visits: no  Next appointment:   10/2/23  Last fill date and by whom:  9/8/23 by Patricia Mantilla   Reviewed: no access    Send to provider: Patricia Mantilla

## 2023-09-15 NOTE — PROGRESS NOTES
Infusion Nursing Note:  Jennifer Cervantes presents today for IVF, pain meds, anti-emetics, anti-allergy.    Patient seen by provider today: No   present during visit today: Not Applicable.    Note: As ordered in the therapy plan, below medications and IVFadministered:  Administrations This Visit       diphenhydrAMINE (BENADRYL) capsule 25 mg       Admin Date  09/15/2023 Action  $Given Dose  25 mg Route  Oral Administered By  Lien Hathaway RN              heparin 100 unit/mL injection 5 mL       Admin Date  09/15/2023 Action  $Given Dose  5 mL Route  Intracatheter Administered By  Cristela Henry RN              HYDROmorphone (DILAUDID) injection 1 mg       Admin Date  09/15/2023 Action  $Given Dose  1 mg Route  Intravenous Administered By  Lien Hathaway RN               Admin Date  09/15/2023 Action  $Given Dose  1 mg Route  Intravenous Administered By  Lien Hathaway RN               Admin Date  09/15/2023 Action  $Given Dose  1 mg Route  Intravenous Administered By  Cristela Henry RN              lactated ringers BOLUS 1,000 mL       Admin Date  09/15/2023 Action  $New Bag Dose  1,000 mL Rate  500 mL/hr Route  Intravenous Administered By  Lien Hathaway RN              ondansetron (ZOFRAN ODT) ODT tab 8 mg       Admin Date  09/15/2023 Action  $Given Dose  8 mg Route  Oral Administered By  Lien Hathaway RN                -Dilaudid 1 mg given every hr PRN for 3 doses.  -Zofran ODT and benadryl tablet given after the first dose per patient preference.    Intravenous Access:  Implanted Port.    Treatment Conditions:    None    Post Infusion Assessment:  Patient tolerated infusion without incident.  Blood return noted pre and post infusion.  Site patent and intact, free from redness, edema or discomfort.  No evidence of extravasations.       Discharge Plan:   Discharge instructions reviewed with: Patient.  Patient and/or family verbalized understanding of discharge instructions and all  questions answered.  AVS to patient via [employer].  Patient will return as needed for next appointment.   Patient discharged in stable condition accompanied by: self.  Departure Mode: Ambulatory.    /71 (BP Location: Left arm, Patient Position: Semi-Short's, Cuff Size: Adult Regular)   Pulse 76   Temp 98  F (36.7  C) (Oral)   Resp 16   SpO2 97%      Lien Hathaway RN

## 2023-09-16 NOTE — DISCHARGE INSTRUCTIONS
Please make an appointment to follow up with Your Primary Care Provider as soon as possible.   Problem: Discharge Planning  Goal: Discharge to home or other facility with appropriate resources  Outcome: Progressing  Flowsheets (Taken 9/15/2023 2000)  Discharge to home or other facility with appropriate resources: Identify barriers to discharge with patient and caregiver     Problem: Pain  Goal: Verbalizes/displays adequate comfort level or baseline comfort level  Outcome: Progressing  Flowsheets (Taken 9/15/2023 2100)  Verbalizes/displays adequate comfort level or baseline comfort level: Encourage patient to monitor pain and request assistance     Problem: Skin/Tissue Integrity  Goal: Absence of new skin breakdown  Description: 1. Monitor for areas of redness and/or skin breakdown  2. Assess vascular access sites hourly  3. Every 4-6 hours minimum:  Change oxygen saturation probe site  4. Every 4-6 hours:  If on nasal continuous positive airway pressure, respiratory therapy assess nares and determine need for appliance change or resting period.   Outcome: Progressing     Problem: Safety - Adult  Goal: Free from fall injury  Outcome: Progressing  Flowsheets (Taken 9/15/2023 2100)  Free From Fall Injury: Instruct family/caregiver on patient safety     Problem: ABCDS Injury Assessment  Goal: Absence of physical injury  Outcome: Progressing  Flowsheets (Taken 9/15/2023 2100)  Absence of Physical Injury: Implement safety measures based on patient assessment     Problem: Neurosensory - Adult  Goal: Achieves stable or improved neurological status  Outcome: Progressing  Flowsheets (Taken 9/15/2023 2000)  Achieves stable or improved neurological status: Assess for and report changes in neurological status     Problem: Respiratory - Adult  Goal: Achieves optimal ventilation and oxygenation  Outcome: Progressing  Flowsheets (Taken 9/15/2023 2000)  Achieves optimal ventilation and oxygenation: Assess for changes in respiratory status     Problem: Cardiovascular - Adult  Goal: Maintains optimal cardiac output and hemodynamic stability  Outcome: Progressing  Flowsheets (Taken 9/15/2023 2000)  Maintains optimal cardiac output and hemodynamic stability: Monitor blood pressure and heart rate

## 2023-09-18 ENCOUNTER — NURSE TRIAGE (OUTPATIENT)
Dept: ONCOLOGY | Facility: CLINIC | Age: 23
End: 2023-09-18
Payer: COMMERCIAL

## 2023-09-18 ENCOUNTER — INFUSION THERAPY VISIT (OUTPATIENT)
Dept: INFUSION THERAPY | Facility: CLINIC | Age: 23
End: 2023-09-18
Attending: PEDIATRICS
Payer: COMMERCIAL

## 2023-09-18 VITALS
OXYGEN SATURATION: 96 % | HEART RATE: 78 BPM | SYSTOLIC BLOOD PRESSURE: 124 MMHG | TEMPERATURE: 98.1 F | DIASTOLIC BLOOD PRESSURE: 75 MMHG | RESPIRATION RATE: 16 BRPM

## 2023-09-18 DIAGNOSIS — D57.00 SICKLE CELL PAIN CRISIS (H): Primary | ICD-10-CM

## 2023-09-18 DIAGNOSIS — G81.10 SPASTIC HEMIPLEGIA, UNSPECIFIED ETIOLOGY, UNSPECIFIED LATERALITY (H): ICD-10-CM

## 2023-09-18 PROCEDURE — 250N000011 HC RX IP 250 OP 636: Performed by: PEDIATRICS

## 2023-09-18 PROCEDURE — 258N000003 HC RX IP 258 OP 636: Performed by: PEDIATRICS

## 2023-09-18 PROCEDURE — 96376 TX/PRO/DX INJ SAME DRUG ADON: CPT

## 2023-09-18 PROCEDURE — 250N000013 HC RX MED GY IP 250 OP 250 PS 637: Performed by: PEDIATRICS

## 2023-09-18 PROCEDURE — 96374 THER/PROPH/DIAG INJ IV PUSH: CPT

## 2023-09-18 PROCEDURE — 96361 HYDRATE IV INFUSION ADD-ON: CPT

## 2023-09-18 RX ORDER — DIPHENHYDRAMINE HCL 25 MG
25 CAPSULE ORAL
Status: COMPLETED | OUTPATIENT
Start: 2023-09-18 | End: 2023-09-18

## 2023-09-18 RX ORDER — ONDANSETRON 4 MG/1
8 TABLET, ORALLY DISINTEGRATING ORAL
Status: COMPLETED | OUTPATIENT
Start: 2023-09-18 | End: 2023-09-18

## 2023-09-18 RX ORDER — ONDANSETRON 4 MG/1
8 TABLET, FILM COATED ORAL
Status: CANCELLED
Start: 2023-10-01

## 2023-09-18 RX ORDER — DIPHENHYDRAMINE HCL 25 MG
25 CAPSULE ORAL
Status: CANCELLED
Start: 2023-10-01

## 2023-09-18 RX ORDER — HEPARIN SODIUM (PORCINE) LOCK FLUSH IV SOLN 100 UNIT/ML 100 UNIT/ML
5 SOLUTION INTRAVENOUS
Status: DISCONTINUED | OUTPATIENT
Start: 2023-09-18 | End: 2023-09-18 | Stop reason: HOSPADM

## 2023-09-18 RX ORDER — HEPARIN SODIUM,PORCINE 10 UNIT/ML
5 VIAL (ML) INTRAVENOUS
Status: CANCELLED | OUTPATIENT
Start: 2023-10-01

## 2023-09-18 RX ORDER — HEPARIN SODIUM (PORCINE) LOCK FLUSH IV SOLN 100 UNIT/ML 100 UNIT/ML
5 SOLUTION INTRAVENOUS
Status: CANCELLED | OUTPATIENT
Start: 2023-10-01

## 2023-09-18 RX ADMIN — HYDROMORPHONE HYDROCHLORIDE 1 MG: 1 INJECTION, SOLUTION INTRAMUSCULAR; INTRAVENOUS; SUBCUTANEOUS at 11:14

## 2023-09-18 RX ADMIN — SODIUM CHLORIDE, POTASSIUM CHLORIDE, SODIUM LACTATE AND CALCIUM CHLORIDE 1000 ML: 600; 310; 30; 20 INJECTION, SOLUTION INTRAVENOUS at 11:14

## 2023-09-18 RX ADMIN — ONDANSETRON 8 MG: 4 TABLET, ORALLY DISINTEGRATING ORAL at 11:22

## 2023-09-18 RX ADMIN — HYDROMORPHONE HYDROCHLORIDE 1 MG: 1 INJECTION, SOLUTION INTRAMUSCULAR; INTRAVENOUS; SUBCUTANEOUS at 13:17

## 2023-09-18 RX ADMIN — DIPHENHYDRAMINE HYDROCHLORIDE 25 MG: 25 CAPSULE ORAL at 11:21

## 2023-09-18 RX ADMIN — HYDROMORPHONE HYDROCHLORIDE 1 MG: 1 INJECTION, SOLUTION INTRAMUSCULAR; INTRAVENOUS; SUBCUTANEOUS at 12:18

## 2023-09-18 RX ADMIN — Medication 5 ML: at 13:53

## 2023-09-18 NOTE — TELEPHONE ENCOUNTER
Patient scheduled for infusion today at 11:00 am.  Notified, is able to come.  Social work notified to work on rides.

## 2023-09-18 NOTE — TELEPHONE ENCOUNTER
Oncology Nurse Triage - Sickle Cell Pain Crisis:    Situation: Jennifer  calling about Sickle Cell Pain Crisis, requesting to be added on for IV fluids and pain medicine    Background:     Patient's last infusion was 9/15/23  Last clinic visit date:9/8/23  Does patient have active treatment plan?  Yes      Assessment of Symptoms:  Onset/Duration of symptoms: 1 day    Is it typical sickle cell pain? Yes   Location: lower back  Character: Sharp           Intensity: 8/10    Any radiation of pain, numbness, tingling, weakness, warmth, swelling, discoloration of arms or legs?No     Fever?No  (if yes max temperature recorded in last 24 hours):      Chest Pain Present: No     Shortness of breath: No     Other home therapies tried: HEAT/HEATING PAD and WARM BATH     Last home medication taken and when: Oxy 15 mg. 3 am    Any Refills Needed?: No     Does patient have transportation & length of time to get to clinic: No   Will need transport      Recommendations:     Placed on infusion call tracker    If you do not hear from the infusion center by 2pm then you will not be able to get in for an infusion today. If symptoms worsen while waiting for call back, and/or you experience fever, chills, SOB, chest pain, cough, n/v, dizziness, numbness, swelling, discoloration of extremities, then seek emergency evaluation in Emergency Department.     Please note, if you are late for your appt, you risk losing your infusion appt as it may delay another patient's infusion who arrived on time.

## 2023-09-18 NOTE — PROGRESS NOTES
Nursing Note  Jennifer Cervantes [facility] for:   Chief Complaint   Patient presents with    Infusion     IV Fluids/IV Dilaudid     During today's [facility] appointment, orders from Dr JAS Duncan were completed.  Frequency: as needed    Progress note:  Patient identification verified by name and date of birth.  Assessment completed.  Vitals recorded in Doc Flowsheets.  Patient was provided with education regarding medication/procedure and possible side effects.  Patient verbalized understanding.     present during visit today: Not Applicable.    Treatment Conditions: Non-applicable.    Premedications: were not ordered.    Drug Waste Record: No    Infusion length and rate:  infusion given over approximately  2 hours    Labs: were not ordered for this appointment.    Vascular access: port accessed today.    Is the next appt scheduled? NA    Post Infusion Assessment:  Patient tolerated infusion without incident.  Blood return noted pre and post infusion.  Site patent and intact, free from redness, edema or discomfort.  No evidence of extravasations.  Access discontinued per protocol.     Discharge Plan:   Follow up plan of care with: ordering provider as scheduled.  Discharge instructions were reviewed with patient.  Patient/representative verbalized understanding of discharge instructions and all questions answered.  Patient discharged [facility] in stable condition.  Patient declined AVS.     Sahra Frankel RN    Administrations This Visit       diphenhydrAMINE (BENADRYL) capsule 25 mg       Admin Date  09/18/2023 Action  $Given Dose  25 mg Route  Oral Administered By  Sahra Frankel RN              heparin 100 unit/mL injection 5 mL       Admin Date  09/18/2023 Action  $Given Dose  5 mL Route  Intracatheter Administered By  Sahra Frankel RN              HYDROmorphone (DILAUDID)  injection 1 mg       Admin Date  09/18/2023 Action  $Given Dose  1 mg Route  Intravenous Administered By  Sahra Frankel RN               Admin Date  09/18/2023 Action  $Given Dose  1 mg Route  Intravenous Administered By  Sahra Frankel RN               Admin Date  09/18/2023 Action  $Given Dose  1 mg Route  Intravenous Administered By  Sahra Frankel RN              lactated ringers BOLUS 1,000 mL       Admin Date  09/18/2023 Action  $New Bag Dose  1,000 mL Rate  500 mL/hr Route  Intravenous Administered By  Sahra Frankel RN              ondansetron (ZOFRAN ODT) ODT tab 8 mg       Admin Date  09/18/2023 Action  $Given Dose  8 mg Route  Oral Administered By  Sahra Frankel RN                    BP (P) 114/74 (BP Location: Left arm, Patient Position: Sitting, Cuff Size: Adult Regular)   Pulse (P) 96   Temp (P) 98.1  F (36.7  C) (Oral)   Resp (P) 16   SpO2 (P) 94%

## 2023-09-19 ENCOUNTER — NURSE TRIAGE (OUTPATIENT)
Dept: ONCOLOGY | Facility: CLINIC | Age: 23
End: 2023-09-19
Payer: COMMERCIAL

## 2023-09-19 ENCOUNTER — INFUSION THERAPY VISIT (OUTPATIENT)
Dept: INFUSION THERAPY | Facility: CLINIC | Age: 23
End: 2023-09-19
Attending: PEDIATRICS
Payer: COMMERCIAL

## 2023-09-19 VITALS
HEART RATE: 73 BPM | SYSTOLIC BLOOD PRESSURE: 105 MMHG | DIASTOLIC BLOOD PRESSURE: 62 MMHG | TEMPERATURE: 98.3 F | OXYGEN SATURATION: 91 % | RESPIRATION RATE: 16 BRPM

## 2023-09-19 DIAGNOSIS — D57.00 SICKLE CELL PAIN CRISIS (H): Primary | ICD-10-CM

## 2023-09-19 DIAGNOSIS — G81.10 SPASTIC HEMIPLEGIA, UNSPECIFIED ETIOLOGY, UNSPECIFIED LATERALITY (H): ICD-10-CM

## 2023-09-19 PROCEDURE — 96376 TX/PRO/DX INJ SAME DRUG ADON: CPT

## 2023-09-19 PROCEDURE — 258N000003 HC RX IP 258 OP 636: Performed by: PEDIATRICS

## 2023-09-19 PROCEDURE — 96374 THER/PROPH/DIAG INJ IV PUSH: CPT

## 2023-09-19 PROCEDURE — 96361 HYDRATE IV INFUSION ADD-ON: CPT

## 2023-09-19 PROCEDURE — 250N000011 HC RX IP 250 OP 636: Performed by: PEDIATRICS

## 2023-09-19 RX ORDER — HEPARIN SODIUM (PORCINE) LOCK FLUSH IV SOLN 100 UNIT/ML 100 UNIT/ML
5 SOLUTION INTRAVENOUS
Status: CANCELLED | OUTPATIENT
Start: 2023-10-01

## 2023-09-19 RX ORDER — DIPHENHYDRAMINE HCL 25 MG
25 CAPSULE ORAL
Status: CANCELLED
Start: 2023-10-01

## 2023-09-19 RX ORDER — ONDANSETRON 4 MG/1
8 TABLET, FILM COATED ORAL
Status: CANCELLED
Start: 2023-10-01

## 2023-09-19 RX ORDER — HEPARIN SODIUM,PORCINE 10 UNIT/ML
5 VIAL (ML) INTRAVENOUS
Status: CANCELLED | OUTPATIENT
Start: 2023-10-01

## 2023-09-19 RX ORDER — HEPARIN SODIUM (PORCINE) LOCK FLUSH IV SOLN 100 UNIT/ML 100 UNIT/ML
5 SOLUTION INTRAVENOUS
Status: DISCONTINUED | OUTPATIENT
Start: 2023-09-19 | End: 2023-09-19 | Stop reason: HOSPADM

## 2023-09-19 RX ADMIN — HYDROMORPHONE HYDROCHLORIDE 1 MG: 1 INJECTION, SOLUTION INTRAMUSCULAR; INTRAVENOUS; SUBCUTANEOUS at 11:28

## 2023-09-19 RX ADMIN — HYDROMORPHONE HYDROCHLORIDE 1 MG: 1 INJECTION, SOLUTION INTRAMUSCULAR; INTRAVENOUS; SUBCUTANEOUS at 09:32

## 2023-09-19 RX ADMIN — HYDROMORPHONE HYDROCHLORIDE 1 MG: 1 INJECTION, SOLUTION INTRAMUSCULAR; INTRAVENOUS; SUBCUTANEOUS at 10:29

## 2023-09-19 RX ADMIN — Medication 5 ML: at 11:59

## 2023-09-19 RX ADMIN — SODIUM CHLORIDE, POTASSIUM CHLORIDE, SODIUM LACTATE AND CALCIUM CHLORIDE 1000 ML: 600; 310; 30; 20 INJECTION, SOLUTION INTRAVENOUS at 09:33

## 2023-09-19 NOTE — TELEPHONE ENCOUNTER
Oncology Nurse Triage - Sickle Cell Pain Crisis:    Situation: Jennifer  calling about Sickle Cell Pain Crisis, requesting to be added on for IV fluids and pain medicine    Background:     Patient's last infusion was 9/19/23  Last clinic visit date:9/8/23  Does patient have active treatment plan?  Yes      Assessment of Symptoms:  Onset/Duration of symptoms: 1 day    Is it typical sickle cell pain? Yes   Location: Left Side  Character: Sharp and Burning           Intensity: severe    Any radiation of pain, numbness, tingling, weakness, warmth, swelling, discoloration of arms or legs?No     Fever?No  (if yes max temperature recorded in last 24 hours):      Chest Pain Present: No     Shortness of breath: No     Other home therapies tried:         Last home medication taken and when:     Any Refills Needed?: No     Does patient have transportation & length of time to get to clinic: No         Recommendations:         If you do not hear from the infusion center by 2pm then you will not be able to get in for an infusion today. If symptoms worsen while waiting for call back, and/or you experience fever, chills, SOB, chest pain, cough, n/v, dizziness, numbness, swelling, discoloration of extremities, then seek emergency evaluation in Emergency Department.     Please note, if you are late for your appt, you risk losing your infusion appt as it may delay another patient's infusion who arrived on time.       Patient scheduled for 9 am and she is aware.

## 2023-09-19 NOTE — PATIENT INSTRUCTIONS
Dear Jennifer Cervantes    Thank you for choosing [facility] ([facility]) for your infusion.  The following information is a summary of our appointment as well as important reminders.      If you are a transplant patient and require transplant education, please click on this link: [email or web address].    We look forward in seeing you on your next appointment here at [facility] ([facility]).  Please don t hesitate to call us at [phone] to reschedule any of your appointments or to speak with one of the [facility] registered nurses.  It was a pleasure taking care of you today.    Sincerely,    [facility]  [address] [place] [address]  [id number]  Phone:  [phone]

## 2023-09-19 NOTE — PROGRESS NOTES
Infusion Nursing Note:  Jennifer Cervantes presents today for Patient presents with:  Infusion: IVF, Dialuaid    .    Patient seen by provider today: No   present during visit today: Not Applicable.    Note: Patient identification verified by name and date of birth.  Assessment completed.  Vitals recorded in Doc Flowsheets.  Patient was provided with education regarding medication/procedure and possible side effects.  Patient verbalized understanding.    During [facility]'s [facility] appointment, orders from Dr. Eric Duncan were completed.  Frequency: PRN sickle cell crisis. Pre meds none per orders/patient request.  .      Intravenous Access:  Implanted Port.    Treatment Conditions:  Not Applicable.      Post Infusion Assessment:  Patient tolerated infusion without incident.  Blood return noted pre and post infusion.  Site patent and intact, free from redness, edema or discomfort.  Access discontinued per protocol.       Discharge Plan:   Discharge instructions reviewed with: Patient.  Patient and/or family verbalized understanding of discharge instructions and all questions answered.  AVS to patient via [employer].  Patient will return PRN for next appointment.   Patient discharged in stable condition accompanied by: self.  Departure Mode: Ambulatory. Has Jacob Kelly RN

## 2023-09-22 DIAGNOSIS — D57.00 SICKLE CELL PAIN CRISIS (H): ICD-10-CM

## 2023-09-22 PROCEDURE — 99285 EMERGENCY DEPT VISIT HI MDM: CPT | Mod: 25 | Performed by: EMERGENCY MEDICINE

## 2023-09-22 PROCEDURE — 93005 ELECTROCARDIOGRAM TRACING: CPT | Performed by: EMERGENCY MEDICINE

## 2023-09-22 PROCEDURE — 93010 ELECTROCARDIOGRAM REPORT: CPT | Performed by: EMERGENCY MEDICINE

## 2023-09-22 RX ORDER — ONDANSETRON 2 MG/ML
8 INJECTION INTRAMUSCULAR; INTRAVENOUS ONCE
Status: DISCONTINUED | OUTPATIENT
Start: 2023-09-22 | End: 2023-09-23

## 2023-09-22 RX ORDER — KETOROLAC TROMETHAMINE 15 MG/ML
15 INJECTION, SOLUTION INTRAMUSCULAR; INTRAVENOUS ONCE
Status: COMPLETED | OUTPATIENT
Start: 2023-09-22 | End: 2023-09-23

## 2023-09-22 RX ORDER — OXYCODONE HYDROCHLORIDE 15 MG/1
15 TABLET ORAL EVERY 4 HOURS PRN
Qty: 10 TABLET | Refills: 0 | Status: SHIPPED | OUTPATIENT
Start: 2023-09-22 | End: 2023-09-29

## 2023-09-22 NOTE — TELEPHONE ENCOUNTER
Narcotic Refill Request    Medication(s) requested:  Oxycodone   Person Requesting Refill:Jennifer   What pain is the medication treating: sickle cell pain   How is the medication being taken?:when needed every 4-6 hours   Does pt have enough for today? Will be out this afternoon   Is pain being adequately controlled on the current regimen?: yes   Experiencing any side effects from medication?: none     Date of most recent appointment:  9/8/23 Patricia Mantilla   Any No Show Visits:no   Next appointment:   10/2/23 Dr Duncan   Last fill date and by whom:  9/15/23 Patricia Mantilla    Reviewed: No access     Send to provider: Dr Duncan

## 2023-09-23 ENCOUNTER — HOSPITAL ENCOUNTER (EMERGENCY)
Facility: CLINIC | Age: 23
Discharge: HOME | End: 2023-09-23
Attending: EMERGENCY MEDICINE | Admitting: EMERGENCY MEDICINE
Payer: COMMERCIAL

## 2023-09-23 ENCOUNTER — APPOINTMENT (OUTPATIENT)
Dept: GENERAL RADIOLOGY | Facility: CLINIC | Age: 23
End: 2023-09-23
Attending: EMERGENCY MEDICINE
Payer: COMMERCIAL

## 2023-09-23 VITALS
BODY MASS INDEX: 25.61 KG/M2 | HEIGHT: 64 IN | SYSTOLIC BLOOD PRESSURE: 95 MMHG | TEMPERATURE: 98 F | OXYGEN SATURATION: 97 % | WEIGHT: 150 LBS | DIASTOLIC BLOOD PRESSURE: 52 MMHG | RESPIRATION RATE: 20 BRPM | HEART RATE: 96 BPM

## 2023-09-23 DIAGNOSIS — R07.9 RIGHT-SIDED CHEST PAIN: ICD-10-CM

## 2023-09-23 DIAGNOSIS — D57.00 SICKLE CELL PAIN CRISIS (H): ICD-10-CM

## 2023-09-23 LAB
ALBUMIN SERPL BCG-MCNC: 4.6 G/DL (ref 3.5–5.2)
ALP SERPL-CCNC: 72 U/L (ref 35–104)
ALT SERPL W P-5'-P-CCNC: 17 U/L (ref 0–50)
ANION GAP SERPL CALCULATED.3IONS-SCNC: 10 MMOL/L (ref 7–15)
AST SERPL W P-5'-P-CCNC: 37 U/L (ref 0–45)
ATRIAL RATE - MUSE: 75 BPM
BASOPHILS # BLD AUTO: 0.2 10E3/UL (ref 0–0.2)
BASOPHILS NFR BLD AUTO: 2 %
BILIRUB SERPL-MCNC: 2.5 MG/DL
BUN SERPL-MCNC: 5.5 MG/DL (ref 6–20)
CALCIUM SERPL-MCNC: 9.1 MG/DL (ref 8.6–10)
CHLORIDE SERPL-SCNC: 106 MMOL/L (ref 98–107)
CREAT SERPL-MCNC: 0.53 MG/DL (ref 0.51–0.95)
D DIMER PPP FEU-MCNC: 0.86 UG/ML FEU (ref 0–0.5)
DEPRECATED HCO3 PLAS-SCNC: 22 MMOL/L (ref 22–29)
DIASTOLIC BLOOD PRESSURE - MUSE: NORMAL MMHG
EGFRCR SERPLBLD CKD-EPI 2021: >90 ML/MIN/1.73M2
EOSINOPHIL # BLD AUTO: 0.5 10E3/UL (ref 0–0.7)
EOSINOPHIL NFR BLD AUTO: 5 %
ERYTHROCYTE [DISTWIDTH] IN BLOOD BY AUTOMATED COUNT: 25.6 % (ref 10–15)
GLUCOSE SERPL-MCNC: 115 MG/DL (ref 70–99)
HCG SERPL QL: NEGATIVE
HCT VFR BLD AUTO: 24 % (ref 35–47)
HGB BLD-MCNC: 8.2 G/DL (ref 11.7–15.7)
IMM GRANULOCYTES # BLD: 0 10E3/UL
IMM GRANULOCYTES NFR BLD: 0 %
INTERPRETATION ECG - MUSE: NORMAL
LYMPHOCYTES # BLD AUTO: 2.8 10E3/UL (ref 0.8–5.3)
LYMPHOCYTES NFR BLD AUTO: 25 %
MCH RBC QN AUTO: 28 PG (ref 26.5–33)
MCHC RBC AUTO-ENTMCNC: 34.2 G/DL (ref 31.5–36.5)
MCV RBC AUTO: 82 FL (ref 78–100)
MONOCYTES # BLD AUTO: 0.7 10E3/UL (ref 0–1.3)
MONOCYTES NFR BLD AUTO: 6 %
NEUTROPHILS # BLD AUTO: 6.9 10E3/UL (ref 1.6–8.3)
NEUTROPHILS NFR BLD AUTO: 62 %
NRBC # BLD AUTO: 0.2 10E3/UL
NRBC BLD AUTO-RTO: 1 /100
P AXIS - MUSE: 81 DEGREES
PLATELET # BLD AUTO: 406 10E3/UL (ref 150–450)
POTASSIUM SERPL-SCNC: 3.4 MMOL/L (ref 3.4–5.3)
PR INTERVAL - MUSE: 154 MS
PROT SERPL-MCNC: 7.9 G/DL (ref 6.4–8.3)
QRS DURATION - MUSE: 74 MS
QT - MUSE: 414 MS
QTC - MUSE: 462 MS
R AXIS - MUSE: 46 DEGREES
RBC # BLD AUTO: 2.93 10E6/UL (ref 3.8–5.2)
RETICS # AUTO: 0.44 10E6/UL (ref 0.03–0.1)
RETICS/RBC NFR AUTO: 15.1 % (ref 0.5–2)
SODIUM SERPL-SCNC: 138 MMOL/L (ref 136–145)
SYSTOLIC BLOOD PRESSURE - MUSE: NORMAL MMHG
T AXIS - MUSE: 29 DEGREES
TROPONIN T SERPL HS-MCNC: <6 NG/L
VENTRICULAR RATE- MUSE: 75 BPM
WBC # BLD AUTO: 11.1 10E3/UL (ref 4–11)

## 2023-09-23 PROCEDURE — 71046 X-RAY EXAM CHEST 2 VIEWS: CPT | Mod: 26 | Performed by: RADIOLOGY

## 2023-09-23 PROCEDURE — 80053 COMPREHEN METABOLIC PANEL: CPT | Performed by: EMERGENCY MEDICINE

## 2023-09-23 PROCEDURE — 84484 ASSAY OF TROPONIN QUANT: CPT | Performed by: EMERGENCY MEDICINE

## 2023-09-23 PROCEDURE — 250N000009 HC RX 250: Performed by: EMERGENCY MEDICINE

## 2023-09-23 PROCEDURE — 85379 FIBRIN DEGRADATION QUANT: CPT | Performed by: EMERGENCY MEDICINE

## 2023-09-23 PROCEDURE — 85045 AUTOMATED RETICULOCYTE COUNT: CPT | Performed by: EMERGENCY MEDICINE

## 2023-09-23 PROCEDURE — 71046 X-RAY EXAM CHEST 2 VIEWS: CPT

## 2023-09-23 PROCEDURE — 96374 THER/PROPH/DIAG INJ IV PUSH: CPT | Performed by: EMERGENCY MEDICINE

## 2023-09-23 PROCEDURE — 84703 CHORIONIC GONADOTROPIN ASSAY: CPT | Performed by: EMERGENCY MEDICINE

## 2023-09-23 PROCEDURE — 250N000011 HC RX IP 250 OP 636: Mod: JZ | Performed by: EMERGENCY MEDICINE

## 2023-09-23 PROCEDURE — 250N000013 HC RX MED GY IP 250 OP 250 PS 637: Performed by: EMERGENCY MEDICINE

## 2023-09-23 PROCEDURE — 258N000003 HC RX IP 258 OP 636: Performed by: EMERGENCY MEDICINE

## 2023-09-23 PROCEDURE — 36415 COLL VENOUS BLD VENIPUNCTURE: CPT | Performed by: EMERGENCY MEDICINE

## 2023-09-23 PROCEDURE — 96375 TX/PRO/DX INJ NEW DRUG ADDON: CPT | Performed by: EMERGENCY MEDICINE

## 2023-09-23 PROCEDURE — 85025 COMPLETE CBC W/AUTO DIFF WBC: CPT | Performed by: EMERGENCY MEDICINE

## 2023-09-23 PROCEDURE — 96376 TX/PRO/DX INJ SAME DRUG ADON: CPT | Performed by: EMERGENCY MEDICINE

## 2023-09-23 PROCEDURE — 96361 HYDRATE IV INFUSION ADD-ON: CPT | Performed by: EMERGENCY MEDICINE

## 2023-09-23 RX ORDER — HEPARIN SODIUM (PORCINE) LOCK FLUSH IV SOLN 100 UNIT/ML 100 UNIT/ML
5-10 SOLUTION INTRAVENOUS
Status: DISCONTINUED | OUTPATIENT
Start: 2023-09-23 | End: 2023-09-23 | Stop reason: HOSPADM

## 2023-09-23 RX ORDER — HEPARIN SODIUM,PORCINE 10 UNIT/ML
5-10 VIAL (ML) INTRAVENOUS
Status: DISCONTINUED | OUTPATIENT
Start: 2023-09-23 | End: 2023-09-23 | Stop reason: HOSPADM

## 2023-09-23 RX ADMIN — KETOROLAC TROMETHAMINE 15 MG: 15 INJECTION, SOLUTION INTRAMUSCULAR; INTRAVENOUS at 01:51

## 2023-09-23 RX ADMIN — Medication 4 MG: at 04:35

## 2023-09-23 RX ADMIN — OXYCODONE HYDROCHLORIDE 15 MG: 5 TABLET ORAL at 01:53

## 2023-09-23 RX ADMIN — SODIUM CHLORIDE, POTASSIUM CHLORIDE, SODIUM LACTATE AND CALCIUM CHLORIDE 1000 ML: 600; 310; 30; 20 INJECTION, SOLUTION INTRAVENOUS at 02:02

## 2023-09-23 RX ADMIN — Medication 4 MG: at 03:37

## 2023-09-23 ASSESSMENT — ACTIVITIES OF DAILY LIVING (ADL)
ADLS_ACUITY_SCORE: 35

## 2023-09-23 NOTE — ED NOTES
8:40 AM patient was signed out to me brief history is history of sickle cell disease and previous thrombosis not currently anticoagulated came in with a sharp mild upper chest discomfort she is not eligible for a CT pulmonary embolism study plan was for her to have a VQ scan.  She notified the nurse that she would like to go home.  I spoke with her we talked about the test and the reason for doing it.  She says she feels much better and does not really want to wait around for the test and would come back if her symptoms worsen.  She understands that we are unable to exclude pulmonary embolism as a possible cause of her symptoms and she is okay with that.     Venancio Cochran MD  09/23/23 4167

## 2023-09-23 NOTE — ED PROVIDER NOTES
ED Provider Note  [facility]      History     Chief Complaint   Patient presents with    Sickle Cell Pain Crisis     Sickle cell pain on the right side and chest pain that started tonight.     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  She points to the right lateral chest, states is a sharp pain, hurts more with breathing.  No cough or fever.  No shortness of breath.  No abdominal pain, nausea, vomiting or diarrhea.  No lower extremity pain or swelling.  She has had previous PE, states she is not currently taking any blood thinners.  She states that this feels like her normal sickle cell pain.  She states that she did take her home oxycodone, but it just was not adequate enough to control her pain.    Past Medical History  Past Medical History:   Diagnosis Date    Anxiety     Bleeding disorder (H)     Blood clotting disorder (H)     Cerebral infarction (H) 2015    Cognitive developmental delay     low IQ. 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 side (H)     right hand contractures    Iron overload due to repeated red blood cell transfusions     Migraines     Multiple subsegmental pulmonary emboli without acute cor pulmonale (H) 02/01/2021    Oppositional defiant behavior     Presence of intrauterine contraceptive device 2/18/2020    Superficial venous thrombosis of arm, right 03/25/2021    Uncomplicated asthma      Past Surgical History:   Procedure Laterality Date    AS INSERT TUNNELED CV 2 CATH W/O PORT/PUMP      CHOLECYSTECTOMY      CV RIGHT HEART  CATH MEASUREMENTS RECORDED N/A 11/18/2021    Procedure: Right Heart Cath;  Surgeon: Jackson Stauffer MD;  Location:  HE[facility]ARDI[facility] CATH LAB    INSERT PORT VASCULAR ACCESS Left 4/21/2021    Procedure: INSERTION, VASCULAR ACCESS PORT (NOT SURE ON SIDE UNTIL REMOVAL);  Surgeon: Rajan More MD;  Location: [facility] OR    IR CHEST PORT PLACEMENT > 5 YRS OF AGE  4/21/2021    IR CVC NON TUNNEL LINE REMOVAL  5/6/2021    IR CVC NON TUNNEL PLACEMENT > 5 YRS  04/07/2020    IR CVC NON TUNNEL PLACEMENT > 5 YRS  4/30/2021    IR CVC NON TUNNEL PLACEMENT > 5 YRS  9/7/2022    IR PORT REMOVAL LEFT  4/21/2021    REMOVE PORT VASCULAR ACCESS Left 4/21/2021    Procedure: REMOVAL, VASCULAR ACCESS PORT LEFT;  Surgeon: Rajan More MD;  Location: [facility] OR    REPAIR TENDON ELBOW Right 10/02/2019    Procedure: Right Elbow Flexor Lengthening, Flexor Pronator Slide Of Wrist and Finger, Thumb Adductor Lengthening;  Surgeon: Anai Franco MD;  Location: [facility] OR    TONSILLECTOMY Bilateral 10/02/2019    Procedure: Bilateral Tonsillectomy;  Surgeon: Farhana Guy MD;  Location: [facility] OR    ZZC BREAST REDUCTION (INCLUDES LIPO) TIER 3 Bilateral 04/23/2019     acetaminophen (TYLENOL) 325 MG tablet  albuterol (PROAIR HFA/PROVENTIL HFA/VENTOLIN HFA) 108 (90 Base) MCG/ACT inhaler  albuterol (PROVENTIL) (2.5 MG/3ML) 0.083% neb solution  aspirin (ASA) 81 MG chewable tablet  budesonide-formoterol (SYMBICORT) 160-4.5 MCG/ACT Inhaler  cetirizine (ZYRTEC) 10 MG tablet  deferasirox (JADENU) 360 MG tablet  diphenhydrAMINE (BENADRYL) 25 MG capsule  EPINEPHrine (ANY BX GENERIC EQUIV) 0.3 MG/0.3ML injection 2-pack  Hydroxyurea 1000 MG TABS  naloxone (NARCAN) 4 MG/0.1ML nasal spray  ondansetron (ZOFRAN) 8 MG tablet  oxyCODONE IR (ROXICODONE) 15 MG tablet      Allergies   Allergen Reactions    Contrast Dye      Hives and breathing issues    Fish-Derived Products Hives    Seafood Hives    Gadolinium     Iodinated Contrast Media      Family  "History  Family History   Problem Relation Age of Onset    Sickle Cell Trait Mother     Hypertension Mother     Asthma Mother     Sickle Cell Trait Father     Glaucoma No family hx of     Macular Degeneration No family hx of     Diabetes No family hx of     Gout No family hx of      Social History   Social History     Tobacco Use    Smoking status: Never     Passive exposure: Never    Smokeless tobacco: Never   Substance Use Topics    Alcohol use: Not Currently     Alcohol/week: 0.0 standard drinks of alcohol    Drug use: Never         A medically appropriate review of systems was performed with pertinent positives and negatives noted in the HPI, and all other systems negative.    Physical Exam   BP: 113/73  Pulse: 92  Temp: 98.3  F (36.8  C)  Resp: 16  Height: 162.6 cm (5' 4\")  Weight: 68 kg (150 lb)  SpO2: 96 %  Physical Exam  Constitutional:       General: She is not in acute distress.     Appearance: Normal appearance. She is not toxic-appearing.   HENT:      Head: Atraumatic.   Eyes:      General: No scleral icterus.     Conjunctiva/sclera: Conjunctivae normal.   Cardiovascular:      Rate and Rhythm: Normal rate.      Heart sounds: Normal heart sounds.   Pulmonary:      Effort: No respiratory distress.      Breath sounds: Normal breath sounds.   Chest:      Chest wall: Tenderness (mild right lateral chest tenderness) present.   Abdominal:      Palpations: Abdomen is soft.      Tenderness: There is no abdominal tenderness.   Musculoskeletal:         General: No swelling.      Cervical back: Neck supple.      Right lower leg: No edema.      Left lower leg: No edema.   Skin:     General: Skin is warm.   Neurological:      Mental Status: She is alert.           ED Course, Procedures, & Data      Procedures            EKG Interpretation:      Interpreted by Court Ring MD  Time reviewed: 0040  Symptoms at time of EKG: right sided chest pain   Rhythm: normal sinus   Rate: 75  Axis: Normal  Ectopy: " none  Conduction: normal  ST Segments/ T Waves: Non-specific T wave changes  Q Waves: none  Comparison to prior: Unchanged    Clinical Impression: stable EKG                       Results for orders placed or performed during the hospital encounter of 09/23/23   Chest XR,  PA & LAT     Status: None    Narrative    EXAM: XR CHEST 2 VIEWS  LOCATION: [facility]  DATE: 9/23/2023    INDICATION: Chest pain on the right.  COMPARISON: Chest x-ray on 08/16/2023.      Impression    IMPRESSION: PA and lateral views of the chest were obtained. Left chest wall Port-A-Cath distal tip projects over low SVC. Cardiomediastinal silhouette is within normal limits. Mild asymmetric elevation of the right hemidiaphragm as compared to the left.   No suspicious focal pulmonary opacities. No significant pleural effusion or pneumothorax.   Comprehensive metabolic panel     Status: Abnormal   Result Value Ref Range    Sodium 138 136 - 145 mmol/L    Potassium 3.4 3.4 - 5.3 mmol/L    Chloride 106 98 - 107 mmol/L    Carbon Dioxide (CO2) 22 22 - 29 mmol/L    Anion Gap 10 7 - 15 mmol/L    Urea Nitrogen 5.5 (L) 6.0 - 20.0 mg/dL    Creatinine 0.53 0.51 - 0.95 mg/dL    Calcium 9.1 8.6 - 10.0 mg/dL    Glucose 115 (H) 70 - 99 mg/dL    Alkaline Phosphatase 72 35 - 104 U/L    AST 37 0 - 45 U/L    ALT 17 0 - 50 U/L    Protein Total 7.9 6.4 - 8.3 g/dL    Albumin 4.6 3.5 - 5.2 g/dL    Bilirubin Total 2.5 (H) <=1.2 mg/dL    GFR Estimate >90 >60 mL/min/1.73m2   HCG qualitative Blood     Status: Normal   Result Value Ref Range    hCG Serum Qualitative Negative Negative   Reticulocyte count     Status: Abnormal   Result Value Ref Range    % Reticulocyte 15.1 (H) 0.5 - 2.0 %    Absolute Reticulocyte 0.443 (H) 0.025 - 0.095 10e6/uL   Troponin T, High Sensitivity     Status: Normal   Result Value Ref Range    Troponin T, High Sensitivity <6 <=14 ng/L   CBC with platelets and differential     Status: Abnormal   Result  Value Ref Range    WBC Count 11.1 (H) 4.0 - 11.0 10e3/uL    RBC Count 2.93 (L) 3.80 - 5.20 10e6/uL    Hemoglobin 8.2 (L) 11.7 - 15.7 g/dL    Hematocrit 24.0 (L) 35.0 - 47.0 %    MCV 82 78 - 100 fL    MCH 28.0 26.5 - 33.0 pg    MCHC 34.2 31.5 - 36.5 g/dL    RDW 25.6 (H) 10.0 - 15.0 %    Platelet Count 406 150 - 450 10e3/uL    % Neutrophils 62 %    % Lymphocytes 25 %    % Monocytes 6 %    % Eosinophils 5 %    % Basophils 2 %    % Immature Granulocytes 0 %    NRBCs per 100 WBC 1 (H) <1 /100    Absolute Neutrophils 6.9 1.6 - 8.3 10e3/uL    Absolute Lymphocytes 2.8 0.8 - 5.3 10e3/uL    Absolute Monocytes 0.7 0.0 - 1.3 10e3/uL    Absolute Eosinophils 0.5 0.0 - 0.7 10e3/uL    Absolute Basophils 0.2 0.0 - 0.2 10e3/uL    Absolute Immature Granulocytes 0.0 <=0.4 10e3/uL    Absolute NRBCs 0.2 10e3/uL   D dimer quantitative     Status: Abnormal   Result Value Ref Range    D-Dimer Quantitative 0.86 (H) 0.00 - 0.50 ug/mL FEU    Narrative    This D-dimer assay is intended for use in conjunction with a clinical pretest probability assessment model to exclude pulmonary embolism (PE) and deep venous thrombosis (DVT) in outpatients suspected of PE or DVT. The cut-off value is 0.50 ug/mL FEU.   EKG 12 lead     Status: None (Preliminary result)   Result Value Ref Range    Systolic Blood Pressure  mmHg    Diastolic Blood Pressure  mmHg    Ventricular Rate 75 BPM    Atrial Rate 75 BPM    OH Interval 154 ms    QRS Duration 74 ms     ms    QTc 462 ms    P Axis 81 degrees    R AXIS 46 degrees    T Axis 29 degrees    Interpretation ECG       Sinus rhythm  Nonspecific T wave abnormality  Prolonged QT  Abnormal ECG     CBC with platelets differential     Status: Abnormal    Narrative    The following orders were created for panel order CBC with platelets differential.  Procedure                               Abnormality         Status                     ---------                               -----------         ------                      CBC with platelets and d...[[id number]]  Abnormal            Final result                 Please view results for these tests on the individual orders.     Medications   ketorolac (TORADOL) injection 15 mg (15 mg Intravenous $Given 9/23/23 0151)   lactated ringers BOLUS 1,000 mL (0 mLs Intravenous Stopped 9/23/23 0330)   oxyCODONE (ROXICODONE) tablet 15 mg (15 mg Oral $Given 9/23/23 0153)   ketamine (KETALAR) injection 4 mg (4 mg Intravenous $Given 9/23/23 0435)     Labs Ordered and Resulted from Time of ED Arrival to Time of ED Departure   COMPREHENSIVE METABOLIC PANEL - Abnormal       Result Value    Sodium 138      Potassium 3.4      Chloride 106      Carbon Dioxide (CO2) 22      Anion Gap 10      Urea Nitrogen 5.5 (*)     Creatinine 0.53      Calcium 9.1      Glucose 115 (*)     Alkaline Phosphatase 72      AST 37      ALT 17      Protein Total 7.9      Albumin 4.6      Bilirubin Total 2.5 (*)     GFR Estimate >90     RETICULOCYTE COUNT - Abnormal    % Reticulocyte 15.1 (*)     Absolute Reticulocyte 0.443 (*)    CBC WITH PLATELETS AND DIFFERENTIAL - Abnormal    WBC Count 11.1 (*)     RBC Count 2.93 (*)     Hemoglobin 8.2 (*)     Hematocrit 24.0 (*)     MCV 82      MCH 28.0      MCHC 34.2      RDW 25.6 (*)     Platelet Count 406      % Neutrophils 62      % Lymphocytes 25      % Monocytes 6      % Eosinophils 5      % Basophils 2      % Immature Granulocytes 0      NRBCs per 100 WBC 1 (*)     Absolute Neutrophils 6.9      Absolute Lymphocytes 2.8      Absolute Monocytes 0.7      Absolute Eosinophils 0.5      Absolute Basophils 0.2      Absolute Immature Granulocytes 0.0      Absolute NRBCs 0.2     D DIMER QUANTITATIVE - Abnormal    D-Dimer Quantitative 0.86 (*)    HCG QUALITATIVE PREGNANCY - Normal    hCG Serum Qualitative Negative     TROPONIN T, HIGH SENSITIVITY - Normal    Troponin T, High Sensitivity <6       Chest XR,  PA & LAT   Final Result   IMPRESSION: PA and lateral views of the chest were obtained.  Left chest wall Port-A-Cath distal tip projects over low SVC. Cardiomediastinal silhouette is within normal limits. Mild asymmetric elevation of the right hemidiaphragm as compared to the left.    No suspicious focal pulmonary opacities. No significant pleural effusion or pneumothorax.      Lung vent and perf (NM)    (Results Pending)          Critical care was not performed.     Medical Decision Making  The patient's presentation was of high complexity (a chronic illness severe exacerbation, progression, or side effect of treatment).    The patient's evaluation involved:  review of external note(s) from 3+ sources (previous notes)  review of 3+ test result(s) ordered prior to this encounter (previous results)  ordering and/or review of 3+ test(s) in this encounter (see separate area of note for details)    The patient's management necessitated high risk (a parenteral controlled substance).    Assessment & Plan    The patient presents with what she feels is her normal sickle cell pain.  However, it is chest pain.  It is right-sided.  EKG is stable compared with previous.  Troponin is negative.  Chest x-ray is not remarkable.  She was given her normal p.o. pain medication without improvement.  She was ultimately placed in a hallway bed from the waiting room and given her ketamine infusion per her care plan.  She does seem much more comfortable with this.  She does have history of previous PE and other thrombosis.  Given the complaint of pleuritic chest pain, I certainly was concerned about this.  She does have a contrast allergy, so chest CT is not able to be performed.  I did order a D-dimer and unfortunately did come back elevated.  We are awaiting a VQ scan at this time.  She will be signed out to the oncoming provider pending VQ scan.  If this is negative I do think she can be discharged home with a plan to follow-up with her outpatient provider.    Dictation Disclaimer: Some of this Note has been completed with  voice-recognition dictation software. Although errors are generally corrected real-time, there is the potential for a rare error to be present in the completed chart.      I have reviewed the nursing notes. I have reviewed the findings, diagnosis, plan and need for follow up with the patient.    New Prescriptions    No medications on file       Final diagnoses:   Sickle cell pain crisis (H)   Right-sided chest pain       Court Ring  [facility] EMERGENCY DEPARTMENT  9/22/2023     Court Ring MD  09/23/23 0733

## 2023-09-23 NOTE — ED TRIAGE NOTES
Triage Assessment       Row Name 09/22/23 2231       Triage Assessment (Adult)    Airway WDL WDL       Respiratory WDL    Respiratory WDL WDL       Breath Sounds    Breath Sounds All Fields       Skin Circulation/Temperature WDL    Skin Circulation/Temperature WDL WDL       Cardiac WDL    Cardiac WDL WDL       Peripheral/Neurovascular WDL    Peripheral Neurovascular WDL WDL       Cognitive/Neuro/Behavioral WDL    Cognitive/Neuro/Behavioral WDL WDL       [place] Coma Scale    Best Eye Response 4-->(E4) spontaneous    Best Motor Response 6-->(M6) obeys commands    Best Verbal Response 5-->(V5) oriented    [place] Coma Scale Score 15

## 2023-09-25 ENCOUNTER — PATIENT OUTREACH (OUTPATIENT)
Dept: ONCOLOGY | Facility: CLINIC | Age: 23
End: 2023-09-25
Payer: COMMERCIAL

## 2023-09-25 NOTE — PROGRESS NOTES
[facility]: Cancer Care                                                                                            Left message for pt to see how she was feeling after recent visit to ED.  PT has appt tomorrow for ferriscan and will send MyChart with appt info.  Left clinic number for pt to follow up as needed.   Signature:  Arely Krueger RN

## 2023-09-26 ENCOUNTER — INFUSION THERAPY VISIT (OUTPATIENT)
Dept: ONCOLOGY | Facility: CLINIC | Age: 23
End: 2023-09-26
Attending: PEDIATRICS
Payer: COMMERCIAL

## 2023-09-26 ENCOUNTER — NURSE TRIAGE (OUTPATIENT)
Dept: ONCOLOGY | Facility: CLINIC | Age: 23
End: 2023-09-26

## 2023-09-26 ENCOUNTER — HOSPITAL ENCOUNTER (OUTPATIENT)
Dept: MRI IMAGING | Facility: CLINIC | Age: 23
Discharge: HOME | End: 2023-09-26
Attending: PEDIATRICS
Payer: COMMERCIAL

## 2023-09-26 VITALS
SYSTOLIC BLOOD PRESSURE: 138 MMHG | OXYGEN SATURATION: 95 % | DIASTOLIC BLOOD PRESSURE: 84 MMHG | TEMPERATURE: 98.3 F | HEART RATE: 96 BPM | RESPIRATION RATE: 18 BRPM

## 2023-09-26 DIAGNOSIS — E83.111 IRON OVERLOAD DUE TO REPEATED RED BLOOD CELL TRANSFUSIONS: ICD-10-CM

## 2023-09-26 DIAGNOSIS — D57.00 SICKLE CELL PAIN CRISIS (H): Primary | ICD-10-CM

## 2023-09-26 DIAGNOSIS — G81.10 SPASTIC HEMIPLEGIA, UNSPECIFIED ETIOLOGY, UNSPECIFIED LATERALITY (H): ICD-10-CM

## 2023-09-26 PROCEDURE — 96361 HYDRATE IV INFUSION ADD-ON: CPT

## 2023-09-26 PROCEDURE — 96374 THER/PROPH/DIAG INJ IV PUSH: CPT

## 2023-09-26 PROCEDURE — 250N000011 HC RX IP 250 OP 636: Performed by: PEDIATRICS

## 2023-09-26 PROCEDURE — 250N000013 HC RX MED GY IP 250 OP 250 PS 637: Performed by: PEDIATRICS

## 2023-09-26 PROCEDURE — 258N000003 HC RX IP 258 OP 636: Performed by: PEDIATRICS

## 2023-09-26 PROCEDURE — 74181 MRI ABDOMEN W/O CONTRAST: CPT

## 2023-09-26 PROCEDURE — 74181 MRI ABDOMEN W/O CONTRAST: CPT | Mod: 26 | Performed by: RADIOLOGY

## 2023-09-26 PROCEDURE — 96376 TX/PRO/DX INJ SAME DRUG ADON: CPT

## 2023-09-26 RX ORDER — HEPARIN SODIUM (PORCINE) LOCK FLUSH IV SOLN 100 UNIT/ML 100 UNIT/ML
5 SOLUTION INTRAVENOUS
Status: CANCELLED | OUTPATIENT
Start: 2023-10-01

## 2023-09-26 RX ORDER — ONDANSETRON 4 MG/1
8 TABLET, FILM COATED ORAL
Status: CANCELLED
Start: 2023-10-01

## 2023-09-26 RX ORDER — ONDANSETRON 4 MG/1
8 TABLET, ORALLY DISINTEGRATING ORAL
Status: COMPLETED | OUTPATIENT
Start: 2023-09-26 | End: 2023-09-26

## 2023-09-26 RX ORDER — HEPARIN SODIUM (PORCINE) LOCK FLUSH IV SOLN 100 UNIT/ML 100 UNIT/ML
5 SOLUTION INTRAVENOUS
Status: DISCONTINUED | OUTPATIENT
Start: 2023-09-26 | End: 2023-09-26 | Stop reason: HOSPADM

## 2023-09-26 RX ORDER — DIPHENHYDRAMINE HCL 25 MG
25 CAPSULE ORAL
Status: CANCELLED
Start: 2023-10-01

## 2023-09-26 RX ORDER — HEPARIN SODIUM,PORCINE 10 UNIT/ML
5 VIAL (ML) INTRAVENOUS
Status: CANCELLED | OUTPATIENT
Start: 2023-10-01

## 2023-09-26 RX ORDER — DIPHENHYDRAMINE HCL 25 MG
25 CAPSULE ORAL
Status: COMPLETED | OUTPATIENT
Start: 2023-09-26 | End: 2023-09-26

## 2023-09-26 RX ADMIN — ONDANSETRON 8 MG: 4 TABLET, ORALLY DISINTEGRATING ORAL at 11:04

## 2023-09-26 RX ADMIN — HYDROMORPHONE HYDROCHLORIDE 1 MG: 1 INJECTION, SOLUTION INTRAMUSCULAR; INTRAVENOUS; SUBCUTANEOUS at 11:07

## 2023-09-26 RX ADMIN — SODIUM CHLORIDE, POTASSIUM CHLORIDE, SODIUM LACTATE AND CALCIUM CHLORIDE 1000 ML: 600; 310; 30; 20 INJECTION, SOLUTION INTRAVENOUS at 11:04

## 2023-09-26 RX ADMIN — Medication 5 ML: at 13:57

## 2023-09-26 RX ADMIN — HYDROMORPHONE HYDROCHLORIDE 1 MG: 1 INJECTION, SOLUTION INTRAMUSCULAR; INTRAVENOUS; SUBCUTANEOUS at 12:08

## 2023-09-26 RX ADMIN — HYDROMORPHONE HYDROCHLORIDE 1 MG: 1 INJECTION, SOLUTION INTRAMUSCULAR; INTRAVENOUS; SUBCUTANEOUS at 13:09

## 2023-09-26 RX ADMIN — DIPHENHYDRAMINE HYDROCHLORIDE 25 MG: 25 CAPSULE ORAL at 11:04

## 2023-09-26 NOTE — PATIENT INSTRUCTIONS
Contact Numbers  [facility]: [phone] (for symptom and scheduling needs)    Please call the [facility] Triage line if you experience a temperature greater than or equal to 100.4, shaking chills, have uncontrolled nausea, vomiting and/or diarrhea, dizziness, shortness of breath, chest pain, bleeding, unexplained bruising, or if you have any other new/concerning symptoms, questions or concerns.     If you are having any concerning symptoms or wish to speak to a provider before your next infusion visit, please call your care coordinator or triage to notify them so we can adequately serve you.     If you need a refill on a narcotic prescription or other medication, please call triage before your infusion appointment.           September 2023 Sunday Monday Tuesday Wednesday Thursday Friday Saturday 1    BMT INFUSION 2 HR (120 MIN)   8:30 AM   (120 min.)    BMT INFUSION NURSE   [facility] 2       3     4     5     6    BMT INFUSION 4 HR (240 MIN)  12:30 PM   (240 min.)    BMT INFUSION NURSE   [facility] 7     8    LAB CENTRAL  10:00 AM   (15 min.)    [facility] DRAW   [facility]    RETURN ACTIVE TREATMENT  10:15 AM   (45 min.)   Patricia Mantilla CNP   [facility]    ONC INFUSION 4 HR (240 MIN)  12:00 PM   (240 min.)    ONC INFUSION NURSE   [facility] 9       10     11    ONC INFUSION 3 HR (180 MIN)  11:30 AM   (180 min.)    ONC INFUSION NURSE   [facility] 12     13     14     15    SPEC INFUSION 2 HR (120 MIN)  10:00 AM   (120 min.)    [facility] INFUSION NURSE   [facility] 16       17     18    SPEC INFUSION 3 HR (180 MIN)  11:00 AM   (180 min.)    [facility] INFUSION NURSE   [facility]  [place] 19    SPEC INFUSION 3 HR (180 MIN)   9:00 AM   (180 min.)   [facility] INFUSION NURSE   [facility] [place] 20     21     22     23    Admission   1:08 AM   [facility] Emergency Department   (Discharge: 9/23/2023)    XR CHEST 2 VIEWS   2:05 AM   (20 min.)   UUXR3   [facility]   24     25     26    MR ABDOMEN WO   9:00 AM   (45 min.)   UUMR1   [facility]    ONC INFUSION 2 HR (120 MIN)  10:30 AM   (120 min.)    ONC INFUSION NURSE   [facility] 27     28 29 30 October 2023 Sunday Monday Tuesday Wednesday Thursday Friday Saturday   1     2    LAB CENTRAL  11:30 AM   (15 min.)   [facility]   [facility]    RETURN CCSL  11:45 AM   (30 min.)   Eric Duncan MD   [facility] 3     4     5     6    LAB CENTRAL  10:30 AM   (15 min.)   [facility]   [facility]    ONC INFUSION 4 HR (240 MIN)  11:00 AM   (240 min.)    ONC INFUSION NURSE   [facility] 7       8     9     10     11     12    NEUROTOXIN   8:45 AM   (60 min.)   Katherine Pierce MD   [facility] 13     14       15     16     17     18     19     20     21       22     23     24     25     26     27     28       29     30     31                                         Lab Results:  No results found for this or any previous visit (from the past 12 hour(s)).

## 2023-09-26 NOTE — TELEPHONE ENCOUNTER
Called patient and offered 10:30 am time.  She can make this appt.  Charge notified, scheduling requested.

## 2023-09-26 NOTE — PROGRESS NOTES
Infusion Nursing Note:  Jennifer Cervantes presents today for IV Fluids and Pain Medications.   Patient seen by provider today: No   present during visit today: Not Applicable.     Note: Patient presents to infusion today reporting 9/10 sickle cell pain in her back. Denies any recent fevers, chills, SOB, chest pains or cough. Offers no new symptoms or concerns at this time. States goal pain level today is a 6/10.     1L IV fluids, benadryl, zofran and 3 doses of IV dilaudid given per therapy plan orders. Patient reported pain 3/10 at time of discharge. Stated she felt comfortable discharging home at this time.    Intravenous Access:  Implanted Port.     Treatment Conditions:  Not Applicable.    Post Infusion Assessment:  Patient tolerated infusion without incident.  Blood return noted pre and post infusion.  Site patent and intact, free from redness, edema or discomfort.  No evidence of extravasations.  Access discontinued per protocol.      Discharge Plan:   Patient declined prescription refills.  Discharge instructions reviewed with: Patient.  Patient and/or family verbalized understanding of discharge instructions and all questions answered.  AVS to patient via [employer].  Patient will return 10/2 for next appointment with Dr. Duncan.   Patient discharged in stable condition accompanied by: self.  Departure Mode: Ambulatory.        Maritza Bautista RN

## 2023-09-26 NOTE — TELEPHONE ENCOUNTER
Oncology Nurse Triage - Sickle Cell Pain Crisis:    Situation: Jennifer  calling about Sickle Cell Pain Crisis    Background:     Patient's last infusion was 09/23/23  Last clinic visit date:09/08/23 w/Patricia Mantilla  Next follow-up appt on 10/02/23 w/  Does patient have active treatment plan?  Yes    (If pt has been seen in ED or infusion in last 3 days or no showed last clinic visit then does not meet protocol unless specified in pt therapy plan)    Assessment of Symptoms:  Onset/Duration of symptoms: 2 day    Is it typical sickle cell pain? Yes   Location: Back  Character: Burning           Intensity: 10/10    Any radiation of pain, numbness, tingling, weakness, warmth, swelling, discoloration of extremities?No     Fever?No  (if yes max temperature recorded in last 24 hours):      Chest Pain Present: No     Shortness of breath: No     Other home therapies tried: HEAT/HEATING PAD and WARM BATH     Last home medication taken and when: Oxycodone 0600    Any Refills Needed?: No     Does patient have transportation & length of time to get to clinic: Pt has appt at 0900 with provider. If infusion appt is after she leaves [facility] she will need transportation. Otherwise, she will just need a ride home.    Grades for reference:     Grade 1 -   Patient has active treatment plan.   Patients last scheduled clinic appointment was attended  Patient has not been seen in infusion or ED in the last 3 days (clarify exclusions in therapy plans)   Afebrile   Typical sickle cell pain   Home medications have been tried   No other symptoms         Recommendations:   0708 Added to infusion wait list for IVF/pain meds per protocol.      If you do not hear from the infusion center by 2pm then you will not be able to get in for an infusion today. If symptoms worsen while waiting for call back, and/or you experience fever, chills, SOB, chest pain, cough, n/v, dizziness, numbness, swelling, discoloration of extremities, then seek emergency  evaluation in Emergency Department.     Please note, if you are late for your appt, you risk losing your infusion appt as it may delay another patient's infusion who arrived on time.

## 2023-09-29 ENCOUNTER — NURSE TRIAGE (OUTPATIENT)
Dept: ONCOLOGY | Facility: CLINIC | Age: 23
End: 2023-09-29
Payer: COMMERCIAL

## 2023-09-29 ENCOUNTER — INFUSION THERAPY VISIT (OUTPATIENT)
Dept: TRANSPLANT | Facility: CLINIC | Age: 23
End: 2023-09-29
Attending: INTERNAL MEDICINE
Payer: COMMERCIAL

## 2023-09-29 ENCOUNTER — PATIENT OUTREACH (OUTPATIENT)
Dept: CARE COORDINATION | Facility: CLINIC | Age: 23
End: 2023-09-29
Payer: COMMERCIAL

## 2023-09-29 VITALS
HEART RATE: 81 BPM | DIASTOLIC BLOOD PRESSURE: 70 MMHG | OXYGEN SATURATION: 93 % | TEMPERATURE: 97.8 F | RESPIRATION RATE: 18 BRPM | SYSTOLIC BLOOD PRESSURE: 125 MMHG

## 2023-09-29 DIAGNOSIS — G81.10 SPASTIC HEMIPLEGIA, UNSPECIFIED ETIOLOGY, UNSPECIFIED LATERALITY (H): ICD-10-CM

## 2023-09-29 DIAGNOSIS — D57.00 SICKLE CELL PAIN CRISIS (H): Primary | ICD-10-CM

## 2023-09-29 DIAGNOSIS — D57.00 SICKLE CELL PAIN CRISIS (H): ICD-10-CM

## 2023-09-29 PROCEDURE — 250N000011 HC RX IP 250 OP 636: Performed by: PEDIATRICS

## 2023-09-29 PROCEDURE — 96374 THER/PROPH/DIAG INJ IV PUSH: CPT

## 2023-09-29 PROCEDURE — 258N000003 HC RX IP 258 OP 636: Performed by: PEDIATRICS

## 2023-09-29 PROCEDURE — 96361 HYDRATE IV INFUSION ADD-ON: CPT

## 2023-09-29 PROCEDURE — 250N000013 HC RX MED GY IP 250 OP 250 PS 637: Performed by: PEDIATRICS

## 2023-09-29 PROCEDURE — 96376 TX/PRO/DX INJ SAME DRUG ADON: CPT

## 2023-09-29 RX ORDER — ONDANSETRON 4 MG/1
8 TABLET, FILM COATED ORAL
Status: CANCELLED
Start: 2023-10-01

## 2023-09-29 RX ORDER — DIPHENHYDRAMINE HCL 25 MG
25 CAPSULE ORAL
Status: CANCELLED
Start: 2023-10-01

## 2023-09-29 RX ORDER — HEPARIN SODIUM (PORCINE) LOCK FLUSH IV SOLN 100 UNIT/ML 100 UNIT/ML
5 SOLUTION INTRAVENOUS
Status: CANCELLED | OUTPATIENT
Start: 2023-10-01

## 2023-09-29 RX ORDER — OXYCODONE HYDROCHLORIDE 15 MG/1
15 TABLET ORAL EVERY 4 HOURS PRN
Qty: 10 TABLET | Refills: 0 | Status: SHIPPED | OUTPATIENT
Start: 2023-09-29 | End: 2023-10-02

## 2023-09-29 RX ORDER — HEPARIN SODIUM,PORCINE 10 UNIT/ML
5 VIAL (ML) INTRAVENOUS
Status: CANCELLED | OUTPATIENT
Start: 2023-10-01

## 2023-09-29 RX ORDER — ONDANSETRON 4 MG/1
8 TABLET, ORALLY DISINTEGRATING ORAL
Status: COMPLETED | OUTPATIENT
Start: 2023-09-29 | End: 2023-09-29

## 2023-09-29 RX ORDER — DIPHENHYDRAMINE HCL 25 MG
25 CAPSULE ORAL
Status: COMPLETED | OUTPATIENT
Start: 2023-09-29 | End: 2023-09-29

## 2023-09-29 RX ADMIN — DIPHENHYDRAMINE HYDROCHLORIDE 25 MG: 25 CAPSULE ORAL at 12:57

## 2023-09-29 RX ADMIN — HYDROMORPHONE HYDROCHLORIDE 1 MG: 1 INJECTION, SOLUTION INTRAMUSCULAR; INTRAVENOUS; SUBCUTANEOUS at 14:01

## 2023-09-29 RX ADMIN — HYDROMORPHONE HYDROCHLORIDE 1 MG: 1 INJECTION, SOLUTION INTRAMUSCULAR; INTRAVENOUS; SUBCUTANEOUS at 15:00

## 2023-09-29 RX ADMIN — ONDANSETRON 8 MG: 4 TABLET, ORALLY DISINTEGRATING ORAL at 12:57

## 2023-09-29 RX ADMIN — HYDROMORPHONE HYDROCHLORIDE 1 MG: 1 INJECTION, SOLUTION INTRAMUSCULAR; INTRAVENOUS; SUBCUTANEOUS at 13:00

## 2023-09-29 RX ADMIN — SODIUM CHLORIDE, POTASSIUM CHLORIDE, SODIUM LACTATE AND CALCIUM CHLORIDE 1000 ML: 600; 310; 30; 20 INJECTION, SOLUTION INTRAVENOUS at 12:57

## 2023-09-29 ASSESSMENT — PAIN SCALES - GENERAL: PAINLEVEL: EXTREME PAIN (9)

## 2023-09-29 NOTE — PROGRESS NOTES
Infusion Nursing Note:  Jennifer Cervantes presents today for ivf/pain meds.    Patient seen by provider today: No   present during visit today: Not Applicable.    Note: Jennifer here today with 9/10 pain to low back. Port accessed- pt received 1L LR bolus, po benadryl and tylenol, 3 x1mg IV dilaudid. Pt tolerated infusion well, pain decreased to more tolerable level on discharge. No additional infusion needs identified.      Intravenous Access:  Implanted Port.    Treatment Conditions:  Not Applicable.      Post Infusion Assessment:  Patient tolerated infusion without incident.  Blood return noted pre and post infusion.  Site patent and intact, free from redness, edema or discomfort.  No evidence of extravasations.  Access discontinued per protocol.       Discharge Plan:   Patient discharged in stable condition accompanied by: self.  Departure Mode: Ambulatory.      Allison Bowman RN

## 2023-09-29 NOTE — TELEPHONE ENCOUNTER
Oncology Nurse Triage - Sickle Cell Pain Crisis:    Situation: Jennifer  calling about Sickle Cell Pain Crisis, requesting to be added on for IV fluids and pain medicine    Background:     Patient's last infusion was 9/26/23  Last clinic visit date:9/8/23 Patricia Mantilla CNP  Does patient have active treatment plan?  Yes      Assessment of Symptoms:  Onset/Duration of symptoms: 2 day    Is it typical sickle cell pain? Yes   Location: back  Character: Sharp           Intensity: 9/10    Any radiation of pain, numbness, tingling, weakness, warmth, swelling, discoloration of arms or legs?No     Fever?No    Chest Pain Present: No     Shortness of breath: No     Other home therapies tried: HEAT/HEATING PAD and WARM BATH     Last home medication taken and when: 6am oxycodone    Any Refills Needed?: Yes , oxycodone 909 [address]air    Does patient have transportation & length of time to get to clinic: No, needs assist but may be able to get self to clinic.     Recommendations:   Meets protocol    0724 BMT offering for 1:00pm IVF/Pain, pt in agreement and requests transportation round trip.     0726 Message sent to  about assisting with transportation    0728 Scheduling request sent.     Please note, if you are late for your appt, you risk losing your infusion appt as it may delay another patient's infusion who arrived on time.

## 2023-09-29 NOTE — PROGRESS NOTES
Social Work - Transportation  [facility]    Data/Intervention:  Patient Name: Jennifer Cervantes Goes By: Jennifer    /Age: 2000 (23 year old)    Referral From: Ange Abbasi RN  Reason for Referral:  support requested for patient transportation needs for today's appointment.  Assessment:  called [employer] to arrange ride through patient's insurance. [employer] arranged  for patient from home with [employer]. Patient will need to call [phone] when ready for return ride home.  Plan: Patient is aware of the transportation plan.  available to assist with any other needs.    CARLOS Chavez,UDAY  Hematology/Oncology Social Worker  Phone:[phone] Pager: [phone]

## 2023-09-29 NOTE — TELEPHONE ENCOUNTER
Narcotic Refill Request    Medication(s) requested:  Oxycodone  Person Requesting Refill:adelaide  What pain is the medication treating: sickle cell pain in back  How is the medication being taken?:15mg every 4hours as needed for sickle crisis  Does pt have enough for today? no  Is pain being adequately controlled on the current regimen?: okay, requires intermittent IVF/pain infusions at times  Experiencing any side effects from medication?: denies    Date of most recent appointment:  9/8/23  Any No Show Visits:none recently  Next appointment:   10/2/23  Last fill date and by whom:  9/22/23 Dr. Duncan   Reviewed:       Send to provider: Patricia Mantilla CNP last provider visit

## 2023-10-02 ENCOUNTER — APPOINTMENT (OUTPATIENT)
Dept: LAB | Facility: CLINIC | Age: 23
End: 2023-10-02
Attending: PEDIATRICS
Payer: COMMERCIAL

## 2023-10-02 ENCOUNTER — ONCOLOGY VISIT (OUTPATIENT)
Dept: ONCOLOGY | Facility: CLINIC | Age: 23
End: 2023-10-02
Attending: PEDIATRICS
Payer: COMMERCIAL

## 2023-10-02 ENCOUNTER — NURSE TRIAGE (OUTPATIENT)
Dept: ONCOLOGY | Facility: CLINIC | Age: 23
End: 2023-10-02
Payer: COMMERCIAL

## 2023-10-02 ENCOUNTER — INFUSION THERAPY VISIT (OUTPATIENT)
Dept: INFUSION THERAPY | Facility: CLINIC | Age: 23
End: 2023-10-02
Attending: PEDIATRICS
Payer: COMMERCIAL

## 2023-10-02 VITALS
TEMPERATURE: 98 F | RESPIRATION RATE: 16 BRPM | DIASTOLIC BLOOD PRESSURE: 76 MMHG | OXYGEN SATURATION: 100 % | BODY MASS INDEX: 25.2 KG/M2 | SYSTOLIC BLOOD PRESSURE: 128 MMHG | WEIGHT: 146.8 LBS | HEART RATE: 72 BPM

## 2023-10-02 VITALS
HEART RATE: 75 BPM | OXYGEN SATURATION: 95 % | SYSTOLIC BLOOD PRESSURE: 115 MMHG | RESPIRATION RATE: 16 BRPM | DIASTOLIC BLOOD PRESSURE: 77 MMHG

## 2023-10-02 DIAGNOSIS — D57.00 SICKLE CELL PAIN CRISIS (H): Primary | ICD-10-CM

## 2023-10-02 DIAGNOSIS — J45.909 ASTHMA, UNSPECIFIED ASTHMA SEVERITY, UNSPECIFIED WHETHER COMPLICATED, UNSPECIFIED WHETHER PERSISTENT: Primary | ICD-10-CM

## 2023-10-02 DIAGNOSIS — D57.00 SICKLE CELL PAIN CRISIS (H): ICD-10-CM

## 2023-10-02 DIAGNOSIS — J45.909 ASTHMATIC BRONCHITIS WITHOUT COMPLICATION, UNSPECIFIED ASTHMA SEVERITY, UNSPECIFIED WHETHER PERSISTENT: ICD-10-CM

## 2023-10-02 DIAGNOSIS — G81.10 SPASTIC HEMIPLEGIA, UNSPECIFIED ETIOLOGY, UNSPECIFIED LATERALITY (H): ICD-10-CM

## 2023-10-02 DIAGNOSIS — E83.111 IRON OVERLOAD DUE TO REPEATED RED BLOOD CELL TRANSFUSIONS: ICD-10-CM

## 2023-10-02 DIAGNOSIS — J45.41 MODERATE PERSISTENT ASTHMA WITH EXACERBATION: ICD-10-CM

## 2023-10-02 DIAGNOSIS — D57.1 HB-SS DISEASE WITHOUT CRISIS (H): ICD-10-CM

## 2023-10-02 LAB
FERRITIN SERPL-MCNC: 4852 NG/ML (ref 6–175)
HOLD SPECIMEN: NORMAL
HOLD SPECIMEN: NORMAL

## 2023-10-02 PROCEDURE — 96374 THER/PROPH/DIAG INJ IV PUSH: CPT

## 2023-10-02 PROCEDURE — 96376 TX/PRO/DX INJ SAME DRUG ADON: CPT

## 2023-10-02 PROCEDURE — 250N000013 HC RX MED GY IP 250 OP 250 PS 637: Performed by: PEDIATRICS

## 2023-10-02 PROCEDURE — G0008 ADMIN INFLUENZA VIRUS VAC: HCPCS | Performed by: PEDIATRICS

## 2023-10-02 PROCEDURE — 36591 DRAW BLOOD OFF VENOUS DEVICE: CPT | Performed by: PEDIATRICS

## 2023-10-02 PROCEDURE — 250N000011 HC RX IP 250 OP 636: Mod: JZ | Performed by: PEDIATRICS

## 2023-10-02 PROCEDURE — 96361 HYDRATE IV INFUSION ADD-ON: CPT

## 2023-10-02 PROCEDURE — G0463 HOSPITAL OUTPT CLINIC VISIT: HCPCS | Mod: 25 | Performed by: PEDIATRICS

## 2023-10-02 PROCEDURE — 82728 ASSAY OF FERRITIN: CPT | Performed by: PEDIATRICS

## 2023-10-02 PROCEDURE — 99215 OFFICE O/P EST HI 40 MIN: CPT | Performed by: PEDIATRICS

## 2023-10-02 PROCEDURE — 90686 IIV4 VACC NO PRSV 0.5 ML IM: CPT | Performed by: PEDIATRICS

## 2023-10-02 PROCEDURE — 258N000003 HC RX IP 258 OP 636: Performed by: PEDIATRICS

## 2023-10-02 RX ORDER — DIPHENHYDRAMINE HCL 25 MG
25 CAPSULE ORAL
Status: CANCELLED
Start: 2024-01-01

## 2023-10-02 RX ORDER — HEPARIN SODIUM,PORCINE 10 UNIT/ML
5 VIAL (ML) INTRAVENOUS
Status: CANCELLED | OUTPATIENT
Start: 2024-01-01

## 2023-10-02 RX ORDER — PREDNISONE 20 MG/1
20 TABLET ORAL DAILY
Qty: 3 TABLET | Refills: 0 | Status: SHIPPED | OUTPATIENT
Start: 2023-10-02 | End: 2023-11-27

## 2023-10-02 RX ORDER — BUDESONIDE AND FORMOTEROL FUMARATE DIHYDRATE 160; 4.5 UG/1; UG/1
2 AEROSOL RESPIRATORY (INHALATION) 2 TIMES DAILY
Qty: 10.2 G | Refills: 3 | Status: ON HOLD | OUTPATIENT
Start: 2023-10-02 | End: 2024-04-29

## 2023-10-02 RX ORDER — HEPARIN SODIUM (PORCINE) LOCK FLUSH IV SOLN 100 UNIT/ML 100 UNIT/ML
5 SOLUTION INTRAVENOUS ONCE
Status: COMPLETED | OUTPATIENT
Start: 2023-10-02 | End: 2023-10-02

## 2023-10-02 RX ORDER — DIPHENHYDRAMINE HCL 25 MG
25 CAPSULE ORAL
Status: COMPLETED | OUTPATIENT
Start: 2023-10-02 | End: 2023-10-02

## 2023-10-02 RX ORDER — HEPARIN SODIUM (PORCINE) LOCK FLUSH IV SOLN 100 UNIT/ML 100 UNIT/ML
5 SOLUTION INTRAVENOUS
Status: CANCELLED | OUTPATIENT
Start: 2024-01-01

## 2023-10-02 RX ORDER — ONDANSETRON 4 MG/1
8 TABLET, ORALLY DISINTEGRATING ORAL
Status: COMPLETED | OUTPATIENT
Start: 2023-10-02 | End: 2023-10-02

## 2023-10-02 RX ORDER — ONDANSETRON 4 MG/1
8 TABLET, FILM COATED ORAL
Status: CANCELLED
Start: 2024-01-01

## 2023-10-02 RX ORDER — ALBUTEROL SULFATE 0.83 MG/ML
5 SOLUTION RESPIRATORY (INHALATION) EVERY 6 HOURS PRN
Qty: 90 ML | Refills: 3 | Status: SHIPPED | OUTPATIENT
Start: 2023-10-02 | End: 2023-11-27

## 2023-10-02 RX ORDER — OXYCODONE HYDROCHLORIDE 15 MG/1
15 TABLET ORAL EVERY 4 HOURS PRN
Qty: 10 TABLET | Refills: 0 | Status: SHIPPED | OUTPATIENT
Start: 2023-10-02 | End: 2023-10-06

## 2023-10-02 RX ORDER — HEPARIN SODIUM (PORCINE) LOCK FLUSH IV SOLN 100 UNIT/ML 100 UNIT/ML
5 SOLUTION INTRAVENOUS
Status: DISCONTINUED | OUTPATIENT
Start: 2023-10-02 | End: 2023-10-02 | Stop reason: HOSPADM

## 2023-10-02 RX ADMIN — HYDROMORPHONE HYDROCHLORIDE 1 MG: 1 INJECTION, SOLUTION INTRAMUSCULAR; INTRAVENOUS; SUBCUTANEOUS at 15:05

## 2023-10-02 RX ADMIN — HYDROMORPHONE HYDROCHLORIDE 1 MG: 1 INJECTION, SOLUTION INTRAMUSCULAR; INTRAVENOUS; SUBCUTANEOUS at 14:02

## 2023-10-02 RX ADMIN — HYDROMORPHONE HYDROCHLORIDE 1 MG: 1 INJECTION, SOLUTION INTRAMUSCULAR; INTRAVENOUS; SUBCUTANEOUS at 15:59

## 2023-10-02 RX ADMIN — SODIUM CHLORIDE, POTASSIUM CHLORIDE, SODIUM LACTATE AND CALCIUM CHLORIDE 1000 ML: 600; 310; 30; 20 INJECTION, SOLUTION INTRAVENOUS at 14:01

## 2023-10-02 RX ADMIN — INFLUENZA A VIRUS A/VICTORIA/4897/2022 IVR-238 (H1N1) ANTIGEN (FORMALDEHYDE INACTIVATED), INFLUENZA A VIRUS A/DARWIN/9/2021 SAN-010 (H3N2) ANTIGEN (FORMALDEHYDE INACTIVATED), INFLUENZA B VIRUS B/PHUKET/3073/2013 ANTIGEN (FORMALDEHYDE INACTIVATED), AND INFLUENZA B VIRUS B/MICHIGAN/01/2021 ANTIGEN (FORMALDEHYDE INACTIVATED) 0.5 ML: 15; 15; 15; 15 INJECTION, SUSPENSION INTRAMUSCULAR at 12:49

## 2023-10-02 RX ADMIN — DIPHENHYDRAMINE HYDROCHLORIDE 25 MG: 25 CAPSULE ORAL at 14:34

## 2023-10-02 RX ADMIN — Medication 5 ML: at 11:46

## 2023-10-02 RX ADMIN — Medication 5 ML: at 16:22

## 2023-10-02 RX ADMIN — ONDANSETRON 8 MG: 4 TABLET, ORALLY DISINTEGRATING ORAL at 14:34

## 2023-10-02 ASSESSMENT — PAIN SCALES - GENERAL: PAINLEVEL: EXTREME PAIN (9)

## 2023-10-02 NOTE — PROGRESS NOTES
"Infusion Nursing Note:  Jennifer Cervantes presents today for IVF/pain meds.    Patient seen by provider today: Yes   present during visit today: Not Applicable.    Note: Pt given 1L LR over 2 hours, Dilaudid 1mg IV x3 for c/o pain as well as benadryl and zofran.  Pt tolerated infusion without incident, reporting pain \"9\" on arrival and \"3\" and \"much better\" at the end of infusion.      Intravenous Access:  Labs drawn without difficulty.  Implanted Port.    Treatment Conditions:  Not Applicable.      Post Infusion Assessment:  Patient tolerated infusion without incident.  Blood return noted pre and post infusion.  Site patent and intact, free from redness, edema or discomfort.  No evidence of extravasations.  Access discontinued per protocol.       Discharge Plan:   Discharge instructions reviewed with: Patient.  Patient and/or family verbalized understanding of discharge instructions and all questions answered.  AVS to patient via [employer].  Patient will return per MD for next appointment.   Patient discharged in stable condition accompanied by: self.  Departure Mode: Ambulatory.    Administrations This Visit       diphenhydrAMINE (BENADRYL) capsule 25 mg       Admin Date  10/02/2023 Action  $Given Dose  25 mg Route  Oral Administered By  Deanna Tinoco RN              heparin 100 unit/mL injection 5 mL       Admin Date  10/02/2023 Action  $Given Dose  5 mL Route  Intracatheter Administered By  Deanna Tinoco RN              HYDROmorphone (DILAUDID) injection 1 mg       Admin Date  10/02/2023 Action  $Given Dose  1 mg Route  Intravenous Administered By  Deanna Tinoco RN               Admin Date  10/02/2023 Action  $Given Dose  1 mg Route  Intravenous Administered By  Deanna Tinoco RN               Admin Date  10/02/2023 Action  $Given Dose  1 mg Route  Intravenous Administered By  Deanna Tinoco RN              lactated ringers BOLUS 1,000 mL       Admin Date  10/02/2023 Action  $New Bag " Dose  1,000 mL Rate  500 mL/hr Route  Intravenous Administered By  Deanna Tinoco RN              ondansetron (ZOFRAN ODT) ODT tab 8 mg       Admin Date  10/02/2023 Action  $Given Dose  8 mg Route  Oral Administered By  Deanna Tinoco RN Carissa L. Draper, RN

## 2023-10-02 NOTE — NURSING NOTE
Chief Complaint   Patient presents with    Port Draw     Labs drawn via port by RN, VS done     Port accessed with gripper needle by RN, labs collected, line flushed with saline and heparin.  Vitals taken. Pt checked in for appointment(s).

## 2023-10-02 NOTE — PROGRESS NOTES
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 She had multiple thromboembolic events in 2021 despite adherent anticoagulation use (though warfarin was perpetually low) and there are concerns for chronic thromboembolic disease but did not have pulmonary HTN on a November 2021 cath. She is maintained on chronic PO opioids and twice-weekly infusion visits (since 1/24/22) but has been able to be maintained on this regimen and has stayed out of the ED most of the time with even rarer admissions.     Interval History:  Jennifer is doing very well and is in a good mood today. She has sidney on Friday and feels like it is still helping. She has a bit of aching today but it is manageable. She is happy with how her work is progressing and her boss is very supportive of her health needs. She is also in a long-term relationship with a boyfriend she met last November. She was excited for how that is going. She is down to 10 tablets a day for oxycodone and has been able to stay out of the hospital for the most part. When she is in the ED though, she said the ketamine is not helping as much right now, so she wanted to see if she can get some IV Dilaudid when in the ED.    Sickle Cell Disease Comprehensive Checklist  Bone Health/Avascular Necrosis Screening/Symptoms (each visit): no new concerns today  Leg Ulcer evaluation (every visit): none  Hypertension (every visit):stable 3/10/23  Last pulmonary evaluation (asthma, AMAN, pulm HTN): 9/28/22  Stroke/silent  "cerebral infarct Hx (Y/N): Yes TIA ~2014, first event ~age 1 with full stroke and R sided weakness  Last PCP Visit: 3/6/23  Vaccines:  PCV13: 5/13/19  Pneumovax (PPSV23): 3/04, 10/09, 7/12/19 (next due 7/2024)  Menactra: 4/2010, 9/2015 (MCV done 8/16/21)  Influenza: 11/17/2022   Audiology (chelation): done 6/2020, normal.. However, on 6/7, \"While there is no significant change in grade on the CTCAE 4.03 Scale, there were hearing changes bilaterally for both standard and extended high frequency audiometry.  Will need to determine if an ENT consult is advised due to the asymmetry in extended high frequency testing.\"     Plan last reviewed with patient: 10/2/23    Patient background: 22 yo F, enjoys movies and kids though there are times where she does not really want to talk to people. Does not have a lot of social support at home.     Sickle Cell Disease History  Primary Hematologist Team: Jose Rafael Duncan  PCP: none  Genotype: SS  Acute Pain Crisis Treatment: (limiting IV   ER   Dilaudid 1 mg IV q1h up to 3 doses  Toradol 30 mg IV x1   Maintenance IV fluids with LR  Other: Zofran 8 mg IV PRN nausea  Inpatient:  PCA Dilaudid 1 hr q30 minutes, no basal rate  Toradol 30 mg x6h x 4 hr  LR maintenance x 1-2 days  Other Medications: Zofran  ASA  Supportive Care: Docusate, Senna  Chronic Pain Medications:    Home regimen: oxycodone 15 mg p.o. q.4-6 hours p.r.n. breakthrough pain.  She also continues on Voltaren gel, and Zoloft among other medications.    -Also benefits from mental health visits, acupuncture  Baseline Hemoglobin: 7 g/dl without chronic transfusions  Hydroxyurea use: Yes  H/O blood transfusions: Yes, several (iron overload) Most recent 11/20/2021  H/O Transfusion Reactions: no  Antibodies:none  H/O Acute Chest Syndrome: Yes  Last episode:9/05/22 (previously 4/26/21, 10/2019)   ICU/intubation: not with 9/2022 admission  H/O Stroke: Yes (managed with chronic transfusions in the past, stopped late Spring " 2020)  H/O VTE: Yes (2/2021)  H/O Cholecystectomy or Splenectomy: no  H/O Asthma, Pulm HTN, AVN, Leg Ulcers, Nephropathy, Retinopathy, etc: Iron overload, asthma, chronic lung disease, physical limitations from early stroke    ---------------------------------------  Jennifer Cervantes's Goals (discussed today, 10/2/23)    1-3 month goal:  Reach a year with her boyfriend    6 month goal:  Save money for ultimate goal of moving out    12 month goal:  Move into her own place     Disease-specific goal(s):  Continue to wean oxycodone down, next with reduced doses. Minimize infusion center visits.  ---------------------------------------      Current Outpatient Medications   Medication Sig Dispense Refill    acetaminophen (TYLENOL) 325 MG tablet Take 2 tablets (650 mg) by mouth every 6 hours as needed for mild pain 120 tablet 3    albuterol (PROAIR HFA/PROVENTIL HFA/VENTOLIN HFA) 108 (90 Base) MCG/ACT inhaler Inhale 2 puffs into the lungs every 6 hours as needed for shortness of breath or wheezing 8.5 g 3    albuterol (PROVENTIL) (2.5 MG/3ML) 0.083% neb solution Take 2 vials (5 mg) by nebulization every 6 hours as needed for shortness of breath or wheezing (Patient not taking: Reported on 9/29/2023) 90 mL 3    aspirin (ASA) 81 MG chewable tablet Take 1 tablet (81 mg) by mouth 2 times daily 60 tablet 11    budesonide-formoterol (SYMBICORT) 160-4.5 MCG/ACT Inhaler Inhale 2 puffs into the lungs 2 times daily 10.2 g 3    cetirizine (ZYRTEC) 10 MG tablet Take 1 tablet (10 mg) by mouth daily 30 tablet 1    deferasirox (JADENU) 360 MG tablet Take 4 tablets (1,440 mg) by mouth every evening 120 tablet 4    diphenhydrAMINE (BENADRYL) 25 MG capsule Take 1 capsule (25 mg) by mouth every 6 hours as needed for itching or allergies 30 capsule 0    EPINEPHrine (ANY BX GENERIC EQUIV) 0.3 MG/0.3ML injection 2-pack Inject 0.3 mLs (0.3 mg) into the muscle as needed for anaphylaxis (Patient not taking: Reported on 9/29/2023) 1 each 1     Hydroxyurea 1000 MG TABS Take 3,000 mg by mouth daily 90 tablet 3    naloxone (NARCAN) 4 MG/0.1ML nasal spray Spray 4 mg into one nostril alternating nostrils as needed for opioid reversal every 2-3 minutes until assistance arrives (Patient not taking: Reported on 9/29/2023)      ondansetron (ZOFRAN) 8 MG tablet Take 1 tablet (8 mg) by mouth every 8 hours as needed (Patient not taking: Reported on 9/29/2023) 30 tablet 1    oxyCODONE IR (ROXICODONE) 15 MG tablet Take 1 tablet (15 mg) by mouth every 4 hours as needed for severe pain Goal 4 per day. Max 6 per day. 10 tablet 0       Past Medical History  Past Medical History:   Diagnosis Date    Anxiety     Bleeding disorder (H24)     Blood clotting disorder (H24)     Cerebral infarction (H) 2015    Cognitive developmental delay     low IQ. 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 side (H)     right hand contractures    Iron overload due to repeated red blood cell transfusions     Migraines     Multiple subsegmental pulmonary emboli without acute cor pulmonale (H) 02/01/2021    Oppositional defiant behavior     Presence of intrauterine contraceptive device 2/18/2020    Superficial venous thrombosis of arm, right 03/25/2021    Uncomplicated asthma      Past Surgical History:   Procedure Laterality Date    AS INSERT TUNNELED CV 2 CATH W/O PORT/PUMP      CHOLECYSTECTOMY      CV RIGHT HEART CATH MEASUREMENTS RECORDED N/A 11/18/2021    Procedure: Right Heart Cath;  Surgeon: Jackson Stauffer MD;  Location:  [facility] CATH LAB    INSERT PORT VASCULAR ACCESS Left 4/21/2021    Procedure: INSERTION, VASCULAR ACCESS PORT (NOT SURE ON SIDE UNTIL REMOVAL);  Surgeon: Rajan More MD;  Location: [facility] OR    IR CHEST PORT PLACEMENT > 5 YRS OF AGE  4/21/2021    IR CVC NON TUNNEL LINE REMOVAL  5/6/2021    IR CVC NON TUNNEL PLACEMENT > 5 YRS  04/07/2020     IR CVC NON TUNNEL PLACEMENT > 5 YRS  4/30/2021    IR CVC NON TUNNEL PLACEMENT > 5 YRS  9/7/2022    IR PORT REMOVAL LEFT  4/21/2021    REMOVE PORT VASCULAR ACCESS Left 4/21/2021    Procedure: REMOVAL, VASCULAR ACCESS PORT LEFT;  Surgeon: Rajan More MD;  Location: [facility] OR    REPAIR TENDON ELBOW Right 10/02/2019    Procedure: Right Elbow Flexor Lengthening, Flexor Pronator Slide Of Wrist and Finger, Thumb Adductor Lengthening;  Surgeon: Anai Franco MD;  Location: [facility] OR    TONSILLECTOMY Bilateral 10/02/2019    Procedure: Bilateral Tonsillectomy;  Surgeon: Farhana Guy MD;  Location: [facility] OR    ZZC BREAST REDUCTION (INCLUDES LIPO) TIER 3 Bilateral 04/23/2019     Allergies   Allergen Reactions    Contrast Dye      Hives and breathing issues    Fish-Derived Products Hives    Seafood Hives    Gadolinium     Iodinated Contrast Media      Social History   Social History     Tobacco Use    Smoking status: Never     Passive exposure: Never    Smokeless tobacco: Never   Substance Use Topics    Alcohol use: Not Currently     Alcohol/week: 0.0 standard drinks of alcohol    Drug use: Never    Still living at home with mom and extended family.     Past medical history and social history were reviewed.    Physical Examination:  /76 (BP Location: Right arm, Patient Position: Sitting, Cuff Size: Adult Regular)   Pulse 72   Temp 98  F (36.7  C) (Oral)   Resp 16   Wt 66.6 kg (146 lb 12.8 oz)   SpO2 100%   BMI 25.20 kg/m       Wt Readings from Last 10 Encounters:   10/02/23 66.6 kg (146 lb 12.8 oz)   09/22/23 68 kg (150 lb)   09/08/23 65.4 kg (144 lb 3.2 oz)   08/11/23 65.5 kg (144 lb 4.8 oz)   07/14/23 65.2 kg (143 lb 12.8 oz)   07/10/23 65.3 kg (144 lb)   06/16/23 67 kg (147 lb 12.8 oz)   06/05/23 67.9 kg (149 lb 11.2 oz)   05/19/23 69.3 kg (152 lb 12.8 oz)   05/13/23 72.2 kg (159 lb 3.2 oz)     General: Pleasant female, NAD, smiling and happy today  Eyes: EOMI, PERRL. Mild. scleral  icterus.  Respiratory: Normal respiratory effort.  Ext: No peripheral edema.  MSK: Contractured right arm and hand 2/2 stoke.  Neurologic: Grossly nonfocal. A/O x 4.  Skin: No rashes, petechiae, or bruising noted on exposed skin. Port accessed in left chest    Laboratory Data:  Recent Results (from the past 240 hour(s))   EKG 12 lead    Collection Time: 09/23/23 12:47 AM   Result Value Ref Range    Systolic Blood Pressure  mmHg    Diastolic Blood Pressure  mmHg    Ventricular Rate 75 BPM    Atrial Rate 75 BPM    FL Interval 154 ms    QRS Duration 74 ms     ms    QTc 462 ms    P Axis 81 degrees    R AXIS 46 degrees    T Axis 29 degrees    Interpretation ECG       Sinus rhythm  Nonspecific T wave abnormality  Prolonged QT  Abnormal ECG  Unconfirmed report - interpretation of this ECG is computer generated - see medical record for final interpretation    Confirmed by - EMERGENCY ROOM, PHYSICIAN ([id number]),  ROSANNE RAY ([id number]) on 9/23/2023 10:52:54 AM     Comprehensive metabolic panel    Collection Time: 09/23/23  1:44 AM   Result Value Ref Range    Sodium 138 136 - 145 mmol/L    Potassium 3.4 3.4 - 5.3 mmol/L    Chloride 106 98 - 107 mmol/L    Carbon Dioxide (CO2) 22 22 - 29 mmol/L    Anion Gap 10 7 - 15 mmol/L    Urea Nitrogen 5.5 (L) 6.0 - 20.0 mg/dL    Creatinine 0.53 0.51 - 0.95 mg/dL    Calcium 9.1 8.6 - 10.0 mg/dL    Glucose 115 (H) 70 - 99 mg/dL    Alkaline Phosphatase 72 35 - 104 U/L    AST 37 0 - 45 U/L    ALT 17 0 - 50 U/L    Protein Total 7.9 6.4 - 8.3 g/dL    Albumin 4.6 3.5 - 5.2 g/dL    Bilirubin Total 2.5 (H) <=1.2 mg/dL    GFR Estimate >90 >60 mL/min/1.73m2   HCG qualitative Blood    Collection Time: 09/23/23  1:44 AM   Result Value Ref Range    hCG Serum Qualitative Negative Negative   Reticulocyte count    Collection Time: 09/23/23  1:44 AM   Result Value Ref Range    % Reticulocyte 15.1 (H) 0.5 - 2.0 %    Absolute Reticulocyte 0.443 (H) 0.025 - 0.095 10e6/uL   Troponin T, High Sensitivity     Collection Time: 09/23/23  1:44 AM   Result Value Ref Range    Troponin T, High Sensitivity <6 <=14 ng/L   CBC with platelets and differential    Collection Time: 09/23/23  1:44 AM   Result Value Ref Range    WBC Count 11.1 (H) 4.0 - 11.0 10e3/uL    RBC Count 2.93 (L) 3.80 - 5.20 10e6/uL    Hemoglobin 8.2 (L) 11.7 - 15.7 g/dL    Hematocrit 24.0 (L) 35.0 - 47.0 %    MCV 82 78 - 100 fL    MCH 28.0 26.5 - 33.0 pg    MCHC 34.2 31.5 - 36.5 g/dL    RDW 25.6 (H) 10.0 - 15.0 %    Platelet Count 406 150 - 450 10e3/uL    % Neutrophils 62 %    % Lymphocytes 25 %    % Monocytes 6 %    % Eosinophils 5 %    % Basophils 2 %    % Immature Granulocytes 0 %    NRBCs per 100 WBC 1 (H) <1 /100    Absolute Neutrophils 6.9 1.6 - 8.3 10e3/uL    Absolute Lymphocytes 2.8 0.8 - 5.3 10e3/uL    Absolute Monocytes 0.7 0.0 - 1.3 10e3/uL    Absolute Eosinophils 0.5 0.0 - 0.7 10e3/uL    Absolute Basophils 0.2 0.0 - 0.2 10e3/uL    Absolute Immature Granulocytes 0.0 <=0.4 10e3/uL    Absolute NRBCs 0.2 10e3/uL   D dimer quantitative    Collection Time: 09/23/23  3:29 AM   Result Value Ref Range    D-Dimer Quantitative 0.86 (H) 0.00 - 0.50 ug/mL FEU   Ferritin    Collection Time: 10/02/23 11:53 AM   Result Value Ref Range    Ferritin 4,852 (H) 6 - 175 ng/mL   Extra Green Top (Lithium Heparin) Tube    Collection Time: 10/02/23 11:53 AM   Result Value Ref Range    Hold Specimen JIC    Extra Purple Top Tube    Collection Time: 10/02/23 11:53 AM   Result Value Ref Range    Hold Specimen JIC       9/26/23 Ferriscan  MRI FOR LIVER IRON CONCENTRATION (FERRISCAN)     CLINICAL HISTORY: Iron overload due to repeated blood cell transfusions, sickle cell disease with iron overload. Needs interval Ferriscan.     TECHNIQUE: Sequential non-contrast spin-echo MRI imaging obtained of the liver with TR 2500 msec and progressively longer Miley up to 18 msec.     FINDINGS:  Average liver iron concentration is 28.5 mg/g dry tissue (normal = 0.17 - 1.8), previously 31.6 mg/g  dry tissue.  Average liver iron concentration is 510 mmol/kg dry tissue (normal = 3 - 33), previously 566 mmol/kg dry tissue.          Assessment and Plan:  1. Sickle Cell HgbSS Disease  2. Acute pain crises  3. Chronic Pain  4. 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 History of CVA  7. Hearing loss    Jennifer continues to make progress with her oxycodone taper. She has now reached 10 tabs a week, so I think the next step is to reduce dosing. She remains hesitant to completely discontinue the oxycodone.  Alternatively, she could further extend her tablets by more than 1 week when she gets down to around 10 tablets per fill.  She is feeling very optimistic about her job and relationships.     She did ask She has had 3 hospital admissions and 4 additional ED visits so far this calendar year. Thus, I am open to offering IV Dilaudid (for now). I told her this may change in the future. Despite intermittent influxes in pain following sidney infusions, she believes these are helpful in reducing pain crises. We will continue monthly infusions.    Desferal is currently on hold due to desire to stop/hold infusion due to decreased quality of life related to the amount of time she needed to be connected for infusion. Remains on Jadenu. Her Ferriscan did show improvement from last year. We do need to make sure she gets back into audiology and ophthalmology annually due to chelator      Plan:  -Continue Hydrea to 3000mg daily to help lessen frequency of sickle cell pain.   -She has resumed crizanlizumab infusions which we will continue monthly  -Continue slow taper of oxycodone. Stay at 15 mg tablets, 10 per week. She can self-reduce infusion days/week and we will continue to keep the cap at 2/week for now.  -Continue infusion center visits limited to two times per week (Mondays and Fridays ideally although still needs to call to request). Continue diligent home management with  current medications, heat, rest, compression, warm baths.   -If unable to manage at home can go to ED we will trial IV Dilaudid and take a break from ketamine (10/2/23). No PCA use and goal for any admission would still be to discharge by 5 days or less  - Desferal infusions on hold. Continue Jadenu. Repeat Ferriscan in 4-6 months   -Continue aspirin BID  -RTC with Patricia in 1 month, coordinate with sidney infusions. Got flu shot today but declined COVID. I have recommended that Patricia readdress COVID vaccine at next visit.    40 minutes spent on the date of the encounter doing chart review, review of test results, interpretation of tests, patient visit, and documentation     Patricia Mantilla, CNP

## 2023-10-02 NOTE — PATIENT INSTRUCTIONS
Dear Jennifer Cervantes    Thank you for choosing [facility] ([facility]) for your infusion.  The following information is a summary of our appointment as well as important reminders.        If you are a transplant patient and require transplant education, please click on this link: [email or web address].    We look forward in seeing you on your next appointment here at [facility] ([facility]).  Please don t hesitate to call us at [phone] to reschedule any of your appointments or to speak with one of the [facility] registered nurses.  It was a pleasure taking care of you today.    Sincerely,    [facility]  [address] [place] [address]  [address]  Phone:  [phone]

## 2023-10-02 NOTE — NURSING NOTE
"Oncology Rooming Note    October 2, 2023 12:00 PM   Jennifer Cervantes is a 23 year old female who presents for:    Chief Complaint   Patient presents with    Port Draw     Labs drawn via port by RN, HELEN done    Oncology Clinic Visit     Sickle cell      Initial Vitals: /76 (BP Location: Right arm, Patient Position: Sitting, Cuff Size: Adult Regular)   Pulse 72   Temp 98  F (36.7  C) (Oral)   Resp 16   Wt 66.6 kg (146 lb 12.8 oz)   SpO2 100%   BMI 25.20 kg/m   Estimated body mass index is 25.2 kg/m  as calculated from the following:    Height as of 9/22/23: 1.626 m (5' 4\").    Weight as of this encounter: 66.6 kg (146 lb 12.8 oz). Body surface area is 1.73 meters squared.  Extreme Pain (9) Comment: Data Unavailable   No LMP recorded. Patient has had an implant.  Allergies reviewed: Yes  Medications reviewed: Yes    Medications: Medication refills not needed today.  Pharmacy name entered into [facility]: [facility] [place], [place] - [phone]    Clinical concerns:        Dee Dee Hernandez              "

## 2023-10-02 NOTE — TELEPHONE ENCOUNTER
Narcotic Refill Request  Medication(s) requested:  Oxycodone 15mg IR tabs  Person Requesting Refill: Jennifer  What pain is the medication treating: sickle cell pain  How is the medication being taken?: 1 tab q4h PRN  Does pt have enough for today?  Will be out this afternoon  Is pain being adequately controlled on the current regimen?: yes  Experiencing any side effects from medication?: no    Date of most recent appointment:  9/8/23 w/ Patricia Mantilla  Any No Show Visits: no  Next appointment:   10/2/23 w/ Dr Duncan  Last fill date and by whom:  Patricia Mantilla on 9/29/23   Reviewed: no access    Send to provider: Patricia Mantilla

## 2023-10-02 NOTE — LETTER
10/2/2023         RE: Jennifer Cervantes  [address]  [facility] [address]        Dear Colleague,    Thank you for referring your patient, Jennifer Cervantes, to the [facility]. Please see a copy of my visit note below.    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 She had multiple thromboembolic events in 2021 despite adherent anticoagulation use (though warfarin was perpetually low) and there are concerns for chronic thromboembolic disease but did not have pulmonary HTN on a November 2021 cath. She is maintained on chronic PO opioids and twice-weekly infusion visits (since 1/24/22) but has been able to be maintained on this regimen and has stayed out of the ED most of the time with even rarer admissions.     Interval History:  Jennifer is doing very well and is in a good mood today. She has sidney on Friday and feels like it is still helping. She has a bit of aching today but it is manageable. She is happy with how her work is progressing and her boss is very supportive of her health needs. She is also in a long-term relationship with a boyfriend she met last November. She was excited for how that is going. She is down to 10 tablets a day for oxycodone and has been able to stay out of the hospital for the most part. When she is in the ED though, she said the ketamine is not helping as much right now, so she wanted to see if she can get some IV Dilaudid when in the ED.    Sickle Cell Disease Comprehensive  "Checklist  Bone Health/Avascular Necrosis Screening/Symptoms (each visit): no new concerns today  Leg Ulcer evaluation (every visit): none  Hypertension (every visit):stable 3/10/23  Last pulmonary evaluation (asthma, AMAN, pulm HTN): 9/28/22  Stroke/silent cerebral infarct Hx (Y/N): Yes TIA ~2014, first event ~age 1 with full stroke and R sided weakness  Last PCP Visit: 3/6/23  Vaccines:  PCV13: 5/13/19  Pneumovax (PPSV23): 3/04, 10/09, 7/12/19 (next due 7/2024)  Menactra: 4/2010, 9/2015 (MCV done 8/16/21)  Influenza: 11/17/2022   Audiology (chelation): done 6/2020, normal.. However, on 6/7, \"While there is no significant change in grade on the CTCAE 4.03 Scale, there were hearing changes bilaterally for both standard and extended high frequency audiometry.  Will need to determine if an ENT consult is advised due to the asymmetry in extended high frequency testing.\"     Plan last reviewed with patient: 10/2/23    Patient background: 24 yo F, enjoys movies and kids though there are times where she does not really want to talk to people. Does not have a lot of social support at home.     Sickle Cell Disease History  Primary Hematologist Team: Jose Rafael Duncan  PCP: none  Genotype: SS  Acute Pain Crisis Treatment: (limiting IV   ER   Dilaudid 1 mg IV q1h up to 3 doses  Toradol 30 mg IV x1   Maintenance IV fluids with LR  Other: Zofran 8 mg IV PRN nausea  Inpatient:  PCA Dilaudid 1 hr q30 minutes, no basal rate  Toradol 30 mg x6h x 4 hr  LR maintenance x 1-2 days  Other Medications: Zofran  ASA  Supportive Care: Docusate, Senna  Chronic Pain Medications:    Home regimen: oxycodone 15 mg p.o. q.4-6 hours p.r.n. breakthrough pain.  She also continues on Voltaren gel, and Zoloft among other medications.    -Also benefits from mental health visits, acupuncture  Baseline Hemoglobin: 7 g/dl without chronic transfusions  Hydroxyurea use: Yes  H/O blood transfusions: Yes, several (iron overload) Most recent 11/20/2021  H/O " Transfusion Reactions: no  Antibodies:none  H/O Acute Chest Syndrome: Yes  Last episode:9/05/22 (previously 4/26/21, 10/2019)   ICU/intubation: not with 9/2022 admission  H/O Stroke: Yes (managed with chronic transfusions in the past, stopped late Spring 2020)  H/O VTE: Yes (2/2021)  H/O Cholecystectomy or Splenectomy: no  H/O Asthma, Pulm HTN, AVN, Leg Ulcers, Nephropathy, Retinopathy, etc: Iron overload, asthma, chronic lung disease, physical limitations from early stroke    ---------------------------------------  Jennifer Cervantes's Goals (discussed today, 10/2/23)    1-3 month goal:  Reach a year with her boyfriend    6 month goal:  Save money for ultimate goal of moving out    12 month goal:  Move into her own place     Disease-specific goal(s):  Continue to wean oxycodone down, next with reduced doses. Minimize infusion center visits.  ---------------------------------------      Current Outpatient Medications   Medication Sig Dispense Refill    acetaminophen (TYLENOL) 325 MG tablet Take 2 tablets (650 mg) by mouth every 6 hours as needed for mild pain 120 tablet 3    albuterol (PROAIR HFA/PROVENTIL HFA/VENTOLIN HFA) 108 (90 Base) MCG/ACT inhaler Inhale 2 puffs into the lungs every 6 hours as needed for shortness of breath or wheezing 8.5 g 3    albuterol (PROVENTIL) (2.5 MG/3ML) 0.083% neb solution Take 2 vials (5 mg) by nebulization every 6 hours as needed for shortness of breath or wheezing (Patient not taking: Reported on 9/29/2023) 90 mL 3    aspirin (ASA) 81 MG chewable tablet Take 1 tablet (81 mg) by mouth 2 times daily 60 tablet 11    budesonide-formoterol (SYMBICORT) 160-4.5 MCG/ACT Inhaler Inhale 2 puffs into the lungs 2 times daily 10.2 g 3    cetirizine (ZYRTEC) 10 MG tablet Take 1 tablet (10 mg) by mouth daily 30 tablet 1    deferasirox (JADENU) 360 MG tablet Take 4 tablets (1,440 mg) by mouth every evening 120 tablet 4    diphenhydrAMINE (BENADRYL) 25 MG capsule Take 1 capsule (25 mg) by mouth every  6 hours as needed for itching or allergies 30 capsule 0    EPINEPHrine (ANY BX GENERIC EQUIV) 0.3 MG/0.3ML injection 2-pack Inject 0.3 mLs (0.3 mg) into the muscle as needed for anaphylaxis (Patient not taking: Reported on 9/29/2023) 1 each 1    Hydroxyurea 1000 MG TABS Take 3,000 mg by mouth daily 90 tablet 3    naloxone (NARCAN) 4 MG/0.1ML nasal spray Spray 4 mg into one nostril alternating nostrils as needed for opioid reversal every 2-3 minutes until assistance arrives (Patient not taking: Reported on 9/29/2023)      ondansetron (ZOFRAN) 8 MG tablet Take 1 tablet (8 mg) by mouth every 8 hours as needed (Patient not taking: Reported on 9/29/2023) 30 tablet 1    oxyCODONE IR (ROXICODONE) 15 MG tablet Take 1 tablet (15 mg) by mouth every 4 hours as needed for severe pain Goal 4 per day. Max 6 per day. 10 tablet 0       Past Medical History  Past Medical History:   Diagnosis Date    Anxiety     Bleeding disorder (H24)     Blood clotting disorder (H24)     Cerebral infarction (H) 2015    Cognitive developmental delay     low IQ. 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 side (H)     right hand contractures    Iron overload due to repeated red blood cell transfusions     Migraines     Multiple subsegmental pulmonary emboli without acute cor pulmonale (H) 02/01/2021    Oppositional defiant behavior     Presence of intrauterine contraceptive device 2/18/2020    Superficial venous thrombosis of arm, right 03/25/2021    Uncomplicated asthma      Past Surgical History:   Procedure Laterality Date    AS INSERT TUNNELED CV 2 CATH W/O PORT/PUMP      CHOLECYSTECTOMY      CV RIGHT HEART CATH MEASUREMENTS RECORDED N/A 11/18/2021    Procedure: Right Heart Cath;  Surgeon: Jackson Stauffer MD;  Location:  HEART CARDIAC CATH LAB    INSERT PORT VASCULAR ACCESS Left 4/21/2021    Procedure: INSERTION,  VASCULAR ACCESS PORT (NOT SURE ON SIDE UNTIL REMOVAL);  Surgeon: Rajan More MD;  Location: [facility] OR    IR CHEST PORT PLACEMENT > 5 YRS OF AGE  4/21/2021    IR CVC NON TUNNEL LINE REMOVAL  5/6/2021    IR CVC NON TUNNEL PLACEMENT > 5 YRS  04/07/2020    IR CVC NON TUNNEL PLACEMENT > 5 YRS  4/30/2021    IR CVC NON TUNNEL PLACEMENT > 5 YRS  9/7/2022    IR PORT REMOVAL LEFT  4/21/2021    REMOVE PORT VASCULAR ACCESS Left 4/21/2021    Procedure: REMOVAL, VASCULAR ACCESS PORT LEFT;  Surgeon: Rajan More MD;  Location: [facility] OR    REPAIR TENDON ELBOW Right 10/02/2019    Procedure: Right Elbow Flexor Lengthening, Flexor Pronator Slide Of Wrist and Finger, Thumb Adductor Lengthening;  Surgeon: Anai Franco MD;  Location: [facility] OR    TONSILLECTOMY Bilateral 10/02/2019    Procedure: Bilateral Tonsillectomy;  Surgeon: Farhana Guy MD;  Location: [facility] OR    ZZC BREAST REDUCTION (INCLUDES LIPO) TIER 3 Bilateral 04/23/2019     Allergies   Allergen Reactions    Contrast Dye      Hives and breathing issues    Fish-Derived Products Hives    Seafood Hives    Gadolinium     Iodinated Contrast Media      Social History   Social History     Tobacco Use    Smoking status: Never     Passive exposure: Never    Smokeless tobacco: Never   Substance Use Topics    Alcohol use: Not Currently     Alcohol/week: 0.0 standard drinks of alcohol    Drug use: Never    Still living at home with mom and extended family.     Past medical history and social history were reviewed.    Physical Examination:  /76 (BP Location: Right arm, Patient Position: Sitting, Cuff Size: Adult Regular)   Pulse 72   Temp 98  F (36.7  C) (Oral)   Resp 16   Wt 66.6 kg (146 lb 12.8 oz)   SpO2 100%   BMI 25.20 kg/m       Wt Readings from Last 10 Encounters:   10/02/23 66.6 kg (146 lb 12.8 oz)   09/22/23 68 kg (150 lb)   09/08/23 65.4 kg (144 lb 3.2 oz)   08/11/23 65.5 kg (144 lb 4.8 oz)   07/14/23 65.2 kg (143 lb 12.8 oz)   07/10/23 65.3 kg (144  lb)   06/16/23 67 kg (147 lb 12.8 oz)   06/05/23 67.9 kg (149 lb 11.2 oz)   05/19/23 69.3 kg (152 lb 12.8 oz)   05/13/23 72.2 kg (159 lb 3.2 oz)     General: Pleasant female, NAD, smiling and happy today  Eyes: EOMI, PERRL. Mild. scleral icterus.  Respiratory: Normal respiratory effort.  Ext: No peripheral edema.  MSK: Contractured right arm and hand 2/2 stoke.  Neurologic: Grossly nonfocal. A/O x 4.  Skin: No rashes, petechiae, or bruising noted on exposed skin. Port accessed in left chest    Laboratory Data:  Recent Results (from the past 240 hour(s))   EKG 12 lead    Collection Time: 09/23/23 12:47 AM   Result Value Ref Range    Systolic Blood Pressure  mmHg    Diastolic Blood Pressure  mmHg    Ventricular Rate 75 BPM    Atrial Rate 75 BPM    NE Interval 154 ms    QRS Duration 74 ms     ms    QTc 462 ms    P Axis 81 degrees    R AXIS 46 degrees    T Axis 29 degrees    Interpretation ECG       Sinus rhythm  Nonspecific T wave abnormality  Prolonged QT  Abnormal ECG  Unconfirmed report - interpretation of this ECG is computer generated - see medical record for final interpretation    Confirmed by - EMERGENCY ROOM, PHYSICIAN ([id number]),  ROSANNE RAY ([id number]) on 9/23/2023 10:52:54 AM     Comprehensive metabolic panel    Collection Time: 09/23/23  1:44 AM   Result Value Ref Range    Sodium 138 136 - 145 mmol/L    Potassium 3.4 3.4 - 5.3 mmol/L    Chloride 106 98 - 107 mmol/L    Carbon Dioxide (CO2) 22 22 - 29 mmol/L    Anion Gap 10 7 - 15 mmol/L    Urea Nitrogen 5.5 (L) 6.0 - 20.0 mg/dL    Creatinine 0.53 0.51 - 0.95 mg/dL    Calcium 9.1 8.6 - 10.0 mg/dL    Glucose 115 (H) 70 - 99 mg/dL    Alkaline Phosphatase 72 35 - 104 U/L    AST 37 0 - 45 U/L    ALT 17 0 - 50 U/L    Protein Total 7.9 6.4 - 8.3 g/dL    Albumin 4.6 3.5 - 5.2 g/dL    Bilirubin Total 2.5 (H) <=1.2 mg/dL    GFR Estimate >90 >60 mL/min/1.73m2   HCG qualitative Blood    Collection Time: 09/23/23  1:44 AM   Result Value Ref Range    hCG Serum  Qualitative Negative Negative   Reticulocyte count    Collection Time: 09/23/23  1:44 AM   Result Value Ref Range    % Reticulocyte 15.1 (H) 0.5 - 2.0 %    Absolute Reticulocyte 0.443 (H) 0.025 - 0.095 10e6/uL   Troponin T, High Sensitivity    Collection Time: 09/23/23  1:44 AM   Result Value Ref Range    Troponin T, High Sensitivity <6 <=14 ng/L   CBC with platelets and differential    Collection Time: 09/23/23  1:44 AM   Result Value Ref Range    WBC Count 11.1 (H) 4.0 - 11.0 10e3/uL    RBC Count 2.93 (L) 3.80 - 5.20 10e6/uL    Hemoglobin 8.2 (L) 11.7 - 15.7 g/dL    Hematocrit 24.0 (L) 35.0 - 47.0 %    MCV 82 78 - 100 fL    MCH 28.0 26.5 - 33.0 pg    MCHC 34.2 31.5 - 36.5 g/dL    RDW 25.6 (H) 10.0 - 15.0 %    Platelet Count 406 150 - 450 10e3/uL    % Neutrophils 62 %    % Lymphocytes 25 %    % Monocytes 6 %    % Eosinophils 5 %    % Basophils 2 %    % Immature Granulocytes 0 %    NRBCs per 100 WBC 1 (H) <1 /100    Absolute Neutrophils 6.9 1.6 - 8.3 10e3/uL    Absolute Lymphocytes 2.8 0.8 - 5.3 10e3/uL    Absolute Monocytes 0.7 0.0 - 1.3 10e3/uL    Absolute Eosinophils 0.5 0.0 - 0.7 10e3/uL    Absolute Basophils 0.2 0.0 - 0.2 10e3/uL    Absolute Immature Granulocytes 0.0 <=0.4 10e3/uL    Absolute NRBCs 0.2 10e3/uL   D dimer quantitative    Collection Time: 09/23/23  3:29 AM   Result Value Ref Range    D-Dimer Quantitative 0.86 (H) 0.00 - 0.50 ug/mL FEU   Ferritin    Collection Time: 10/02/23 11:53 AM   Result Value Ref Range    Ferritin 4,852 (H) 6 - 175 ng/mL   Extra Green Top (Lithium Heparin) Tube    Collection Time: 10/02/23 11:53 AM   Result Value Ref Range    Hold Specimen JIC    Extra Purple Top Tube    Collection Time: 10/02/23 11:53 AM   Result Value Ref Range    Hold Specimen JIC       9/26/23 Ferriscan  MRI FOR LIVER IRON CONCENTRATION (FERRISCAN)     CLINICAL HISTORY: Iron overload due to repeated blood cell transfusions, sickle cell disease with iron overload. Needs interval Ferriscan.     TECHNIQUE:  Sequential non-contrast spin-echo MRI imaging obtained of the liver with TR 2500 msec and progressively longer Miley up to 18 msec.     FINDINGS:  Average liver iron concentration is 28.5 mg/g dry tissue (normal = 0.17 - 1.8), previously 31.6 mg/g dry tissue.  Average liver iron concentration is 510 mmol/kg dry tissue (normal = 3 - 33), previously 566 mmol/kg dry tissue.          Assessment and Plan:  1. Sickle Cell HgbSS Disease  2. Acute pain crises  3. Chronic Pain  4. 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 History of CVA  7. Hearing loss    Jennifer continues to make progress with her oxycodone taper. She has now reached 10 tabs a week, so I think the next step is to reduce dosing. She remains hesitant to completely discontinue the oxycodone.  Alternatively, she could further extend her tablets by more than 1 week when she gets down to around 10 tablets per fill.  She is feeling very optimistic about her job and relationships.     She did ask She has had 3 hospital admissions and 4 additional ED visits so far this calendar year. Thus, I am open to offering IV Dilaudid (for now). I told her this may change in the future. Despite intermittent influxes in pain following sidney infusions, she believes these are helpful in reducing pain crises. We will continue monthly infusions.    Desferal is currently on hold due to desire to stop/hold infusion due to decreased quality of life related to the amount of time she needed to be connected for infusion. Remains on Jadenu. Her Ferriscan did show improvement from last year. We do need to make sure she gets back into audiology and ophthalmology annually due to chelator      Plan:  -Continue Hydrea to 3000mg daily to help lessen frequency of sickle cell pain.   -She has resumed crizanlizumab infusions which we will continue monthly  -Continue slow taper of oxycodone. Stay at 15 mg tablets, 10 per week. She can self-reduce  infusion days/week and we will continue to keep the cap at 2/week for now.  -Continue infusion center visits limited to two times per week (Mondays and Fridays ideally although still needs to call to request). Continue diligent home management with current medications, heat, rest, compression, warm baths.   -If unable to manage at home can go to ED we will trial IV Dilaudid and take a break from ketamine (10/2/23). No PCA use and goal for any admission would still be to discharge by 5 days or less  - Desferal infusions on hold. Continue Jadenu. Repeat Ferriscan in 4-6 months   -Continue aspirin BID  -RTC with Patricia in 1 month, coordinate with sidney infusions    40 minutes spent on the date of the encounter doing chart review, review of test results, interpretation of tests, patient visit, and documentation     Patricia Mantilla, CNP

## 2023-10-02 NOTE — TELEPHONE ENCOUNTER
Oncology Nurse Triage - Sickle Cell Pain Crisis:  Situation: Jennifer  calling about Sickle Cell Pain Crisis, requesting to be added on for IV fluids and pain medicine    Background:   Patient's last infusion was 9/29/23  Last clinic visit date:9/8/23 w/ Patricia Mantilla  Does patient have active treatment plan?  Yes    Assessment of Symptoms:  Onset/Duration of symptoms: 2 day    Is it typical sickle cell pain? Yes   Location: back  Character: Sharp           Intensity: 10/10    Any radiation of pain, numbness, tingling, weakness, warmth, swelling, discoloration of arms or legs?No     Fever?No    Chest Pain Present: No     Shortness of breath: No     Other home therapies tried: HEAT/HEATING PAD     Last home medication taken and when: 0600 Oxycodone 15mg     Any Refills Needed?: Yes -Oxycodone    Does patient have transportation & length of time to get to clinic: Yes- pt has 1130 app[facility] at [facility], states she already has a ride to clinic for that, but if IVF/pain meds opens sooner can get a ride to clinic, is about 25 minutes away.      Recommendations:   Added to infusion wait list.   0831 call to pt to offer 2pm appt for IVF/pain meds. Pt accepting of this time. Confirmed with Patricia Qureshi, charge RN in [facility] infusion.   0833 message sent to scheduling.     If you do not hear from the infusion center by 2pm then you will not be able to get in for an infusion today. If symptoms worsen while waiting for call back, and/or you experience fever, chills, SOB, chest pain, cough, n/v, dizziness, numbness, swelling, discoloration of extremities, then seek emergency evaluation in Emergency Department.     Please note, if you are late for your appt, you risk losing your infusion appt as it may delay another patient's infusion who arrived on time.

## 2023-10-03 NOTE — PLAN OF CARE
"Blood pressure 128/53, pulse 101, temperature 98.8  F (37.1  C), temperature source Oral, resp. rate 16, height 1.626 m (5' 4.02\"), weight 70.2 kg (154 lb 12.8 oz), SpO2 92 %, Via 3L NC .        Patient A & O X 4  able to make needs known VSS intermittent de-sat to 88% RA. C/o generalized pain received Dilaudid IV PRN X 1 and Oxycodone PRN X 1. Post assessment with some relief . Right port intact. Patient slept for most of morning . Received scheduled medications . Appetite fair . Void adequate not saved . Report had bowel movement yesterday. Up with SBA  call light within reach. Continue monitor closely and update MD with concerns.   .        Goal Outcome Evaluation:      Plan of Care Reviewed With: patient    Overall Patient Progress: no change. Tried to wean Oxygen continue require 3L NC . Hgb 6.2 . Primary team aware.            " Griselda, Ever Connor MD  You18 hours ago (3:28 PM)     CK  Looking through the notes it looks like I wanted her to start the losartan 25 mg daily but she wanted to increase her carvedilol and felt like it was holding her blood pressure fine.  At this point, would like her to take 25 mg of losartan if she has not been taking it.  The Plavix is very reasonable to do in case it was a TIA but difficult to know whether that was the case.  It is good for her heart but could increase bleeding risk.     Contacted patient to review Dr. Villalobos's recommendations. Patient states that her BP has actually normalized now. She would like to wait a couple of days to see if her BP stays in the normal range. She will monitor her BP and call back if it remains elevated. No further questions.

## 2023-10-04 ENCOUNTER — NURSE TRIAGE (OUTPATIENT)
Dept: ONCOLOGY | Facility: CLINIC | Age: 23
End: 2023-10-04
Payer: COMMERCIAL

## 2023-10-04 NOTE — TELEPHONE ENCOUNTER
Pt was in for labs and visit with Dr. Duncan on 10/2, is scheduled again for labs and visit with Patricia Mantilla on 10/6. Writer informed only Ferritin was drawn on Monday.   Pt wondering if she needs additional labs? As she may need to rearrange her transportation.   Writer informed will send message to Patricia Mantilla and MAURISIO Mcgee and call her back to confirm.     1338 return call to pt to inform that there is no ANDREAS visit this week, not until 11/3. Writer informed labs needed before Jr were not drawn on Monday and she will need to come in for labs at 1030 and infusion at 11. Pt voiced understanding.

## 2023-10-05 RX ORDER — ALBUTEROL SULFATE 0.83 MG/ML
2.5 SOLUTION RESPIRATORY (INHALATION)
Status: CANCELLED | OUTPATIENT
Start: 2023-10-06

## 2023-10-05 RX ORDER — EPINEPHRINE 1 MG/ML
0.3 INJECTION, SOLUTION INTRAMUSCULAR; SUBCUTANEOUS EVERY 5 MIN PRN
Status: CANCELLED | OUTPATIENT
Start: 2023-10-06

## 2023-10-05 RX ORDER — HEPARIN SODIUM,PORCINE 10 UNIT/ML
5 VIAL (ML) INTRAVENOUS
Status: CANCELLED | OUTPATIENT
Start: 2023-10-06

## 2023-10-05 RX ORDER — HEPARIN SODIUM (PORCINE) LOCK FLUSH IV SOLN 100 UNIT/ML 100 UNIT/ML
5 SOLUTION INTRAVENOUS
Status: CANCELLED | OUTPATIENT
Start: 2023-10-06

## 2023-10-05 RX ORDER — METHYLPREDNISOLONE SODIUM SUCCINATE 125 MG/2ML
125 INJECTION, POWDER, LYOPHILIZED, FOR SOLUTION INTRAMUSCULAR; INTRAVENOUS
Status: CANCELLED
Start: 2023-10-06

## 2023-10-05 RX ORDER — MEPERIDINE HYDROCHLORIDE 25 MG/ML
25 INJECTION INTRAMUSCULAR; INTRAVENOUS; SUBCUTANEOUS EVERY 30 MIN PRN
Status: CANCELLED | OUTPATIENT
Start: 2023-10-06

## 2023-10-05 RX ORDER — DIPHENHYDRAMINE HYDROCHLORIDE 50 MG/ML
50 INJECTION INTRAMUSCULAR; INTRAVENOUS
Status: CANCELLED
Start: 2023-10-06

## 2023-10-05 RX ORDER — ALBUTEROL SULFATE 90 UG/1
1-2 AEROSOL, METERED RESPIRATORY (INHALATION)
Status: CANCELLED
Start: 2023-10-06

## 2023-10-06 ENCOUNTER — NURSE TRIAGE (OUTPATIENT)
Dept: ONCOLOGY | Facility: CLINIC | Age: 23
End: 2023-10-06
Payer: COMMERCIAL

## 2023-10-06 ENCOUNTER — INFUSION THERAPY VISIT (OUTPATIENT)
Dept: ONCOLOGY | Facility: CLINIC | Age: 23
End: 2023-10-06
Attending: PEDIATRICS
Payer: COMMERCIAL

## 2023-10-06 ENCOUNTER — APPOINTMENT (OUTPATIENT)
Dept: LAB | Facility: CLINIC | Age: 23
End: 2023-10-06
Attending: PEDIATRICS
Payer: COMMERCIAL

## 2023-10-06 VITALS
TEMPERATURE: 97.4 F | SYSTOLIC BLOOD PRESSURE: 126 MMHG | OXYGEN SATURATION: 97 % | DIASTOLIC BLOOD PRESSURE: 73 MMHG | RESPIRATION RATE: 16 BRPM | HEART RATE: 81 BPM

## 2023-10-06 DIAGNOSIS — D57.00 SICKLE CELL PAIN CRISIS (H): Primary | ICD-10-CM

## 2023-10-06 DIAGNOSIS — D57.00 SICKLE CELL PAIN CRISIS (H): ICD-10-CM

## 2023-10-06 DIAGNOSIS — G81.10 SPASTIC HEMIPLEGIA, UNSPECIFIED ETIOLOGY, UNSPECIFIED LATERALITY (H): ICD-10-CM

## 2023-10-06 LAB
ACANTHOCYTES BLD QL SMEAR: ABNORMAL
ANION GAP SERPL CALCULATED.3IONS-SCNC: 10 MMOL/L (ref 7–15)
AUER BODIES BLD QL SMEAR: ABNORMAL
BASO STIPL BLD QL SMEAR: ABNORMAL
BASO+EOS+MONOS # BLD AUTO: ABNORMAL 10*3/UL
BASO+EOS+MONOS NFR BLD AUTO: ABNORMAL %
BASOPHILS # BLD AUTO: 0 10E3/UL (ref 0–0.2)
BASOPHILS NFR BLD AUTO: 1 %
BITE CELLS BLD QL SMEAR: ABNORMAL
BLISTER CELLS BLD QL SMEAR: ABNORMAL
BUN SERPL-MCNC: 4 MG/DL (ref 6–20)
BURR CELLS BLD QL SMEAR: SLIGHT
CALCIUM SERPL-MCNC: 9 MG/DL (ref 8.6–10)
CHLORIDE SERPL-SCNC: 108 MMOL/L (ref 98–107)
CREAT SERPL-MCNC: 0.51 MG/DL (ref 0.51–0.95)
DACRYOCYTES BLD QL SMEAR: ABNORMAL
DEPRECATED HCO3 PLAS-SCNC: 22 MMOL/L (ref 22–29)
EGFRCR SERPLBLD CKD-EPI 2021: >90 ML/MIN/1.73M2
ELLIPTOCYTES BLD QL SMEAR: ABNORMAL
EOSINOPHIL # BLD AUTO: 0.6 10E3/UL (ref 0–0.7)
EOSINOPHIL NFR BLD AUTO: 5 %
ERYTHROCYTE [DISTWIDTH] IN BLOOD BY AUTOMATED COUNT: 24.9 % (ref 10–15)
FRAGMENTS BLD QL SMEAR: SLIGHT
GLUCOSE SERPL-MCNC: 89 MG/DL (ref 70–99)
HCT VFR BLD AUTO: 23.5 % (ref 35–47)
HGB BLD-MCNC: 8.3 G/DL (ref 11.7–15.7)
HGB C CRYSTALS: ABNORMAL
HOWELL-JOLLY BOD BLD QL SMEAR: ABNORMAL
IMM GRANULOCYTES # BLD: 0 10E3/UL
IMM GRANULOCYTES NFR BLD: 0 %
LYMPHOCYTES # BLD AUTO: 1.8 10E3/UL (ref 0.8–5.3)
LYMPHOCYTES NFR BLD AUTO: 18 %
MCH RBC QN AUTO: 29.5 PG (ref 26.5–33)
MCHC RBC AUTO-ENTMCNC: 35.3 G/DL (ref 31.5–36.5)
MCV RBC AUTO: 84 FL (ref 78–100)
MONOCYTES # BLD AUTO: 0.6 10E3/UL (ref 0–1.3)
MONOCYTES NFR BLD AUTO: 7 %
NEUTROPHILS # BLD AUTO: 6.6 10E3/UL (ref 1.6–8.3)
NEUTROPHILS NFR BLD AUTO: 69 %
NEUTS HYPERSEG BLD QL SMEAR: ABNORMAL
NRBC # BLD AUTO: 0.2 10E3/UL
NRBC BLD AUTO-RTO: 2 /100
PLAT MORPH BLD: ABNORMAL
PLATELET # BLD AUTO: 412 10E3/UL (ref 150–450)
POLYCHROMASIA BLD QL SMEAR: ABNORMAL
POTASSIUM SERPL-SCNC: 3.7 MMOL/L (ref 3.4–5.3)
RBC # BLD AUTO: 2.81 10E6/UL (ref 3.8–5.2)
RBC AGGLUT BLD QL: ABNORMAL
RBC MORPH BLD: ABNORMAL
RETICS # AUTO: 0.54 10E6/UL (ref 0.03–0.1)
RETICS/RBC NFR AUTO: 19.3 % (ref 0.5–2)
ROULEAUX BLD QL SMEAR: ABNORMAL
SICKLE CELLS BLD QL SMEAR: ABNORMAL
SMUDGE CELLS BLD QL SMEAR: ABNORMAL
SODIUM SERPL-SCNC: 140 MMOL/L (ref 135–145)
SPHEROCYTES BLD QL SMEAR: ABNORMAL
STOMATOCYTES BLD QL SMEAR: ABNORMAL
TARGETS BLD QL SMEAR: SLIGHT
TOXIC GRANULES BLD QL SMEAR: ABNORMAL
VARIANT LYMPHS BLD QL SMEAR: ABNORMAL
WBC # BLD AUTO: 9.2 10E3/UL (ref 4–11)

## 2023-10-06 PROCEDURE — 96375 TX/PRO/DX INJ NEW DRUG ADDON: CPT

## 2023-10-06 PROCEDURE — 85025 COMPLETE CBC W/AUTO DIFF WBC: CPT | Performed by: PEDIATRICS

## 2023-10-06 PROCEDURE — 85045 AUTOMATED RETICULOCYTE COUNT: CPT | Performed by: PEDIATRICS

## 2023-10-06 PROCEDURE — 80048 BASIC METABOLIC PNL TOTAL CA: CPT | Performed by: PEDIATRICS

## 2023-10-06 PROCEDURE — 96365 THER/PROPH/DIAG IV INF INIT: CPT

## 2023-10-06 PROCEDURE — 250N000011 HC RX IP 250 OP 636: Performed by: PEDIATRICS

## 2023-10-06 PROCEDURE — 258N000003 HC RX IP 258 OP 636: Performed by: PEDIATRICS

## 2023-10-06 PROCEDURE — 96361 HYDRATE IV INFUSION ADD-ON: CPT

## 2023-10-06 PROCEDURE — 250N000013 HC RX MED GY IP 250 OP 250 PS 637: Performed by: PEDIATRICS

## 2023-10-06 PROCEDURE — 96413 CHEMO IV INFUSION 1 HR: CPT

## 2023-10-06 PROCEDURE — 96376 TX/PRO/DX INJ SAME DRUG ADON: CPT

## 2023-10-06 RX ORDER — OXYCODONE HYDROCHLORIDE 15 MG/1
15 TABLET ORAL EVERY 4 HOURS PRN
Qty: 10 TABLET | Refills: 0 | Status: SHIPPED | OUTPATIENT
Start: 2023-10-06 | End: 2023-10-12

## 2023-10-06 RX ORDER — HEPARIN SODIUM (PORCINE) LOCK FLUSH IV SOLN 100 UNIT/ML 100 UNIT/ML
5 SOLUTION INTRAVENOUS
Status: CANCELLED | OUTPATIENT
Start: 2024-01-01

## 2023-10-06 RX ORDER — HEPARIN SODIUM,PORCINE 10 UNIT/ML
5 VIAL (ML) INTRAVENOUS
Status: CANCELLED | OUTPATIENT
Start: 2024-01-01

## 2023-10-06 RX ORDER — ONDANSETRON 4 MG/1
8 TABLET, FILM COATED ORAL
Status: CANCELLED
Start: 2024-01-01

## 2023-10-06 RX ORDER — HEPARIN SODIUM (PORCINE) LOCK FLUSH IV SOLN 100 UNIT/ML 100 UNIT/ML
5 SOLUTION INTRAVENOUS
Status: DISCONTINUED | OUTPATIENT
Start: 2023-10-06 | End: 2023-10-06 | Stop reason: HOSPADM

## 2023-10-06 RX ORDER — ONDANSETRON 8 MG/1
8 TABLET, ORALLY DISINTEGRATING ORAL
Status: COMPLETED | OUTPATIENT
Start: 2023-10-06 | End: 2023-10-06

## 2023-10-06 RX ORDER — HEPARIN SODIUM (PORCINE) LOCK FLUSH IV SOLN 100 UNIT/ML 100 UNIT/ML
5 SOLUTION INTRAVENOUS DAILY PRN
Status: DISCONTINUED | OUTPATIENT
Start: 2023-10-06 | End: 2023-10-06 | Stop reason: HOSPADM

## 2023-10-06 RX ORDER — DIPHENHYDRAMINE HCL 25 MG
25 CAPSULE ORAL
Status: CANCELLED
Start: 2024-01-01

## 2023-10-06 RX ORDER — DIPHENHYDRAMINE HCL 25 MG
25 CAPSULE ORAL
Status: COMPLETED | OUTPATIENT
Start: 2023-10-06 | End: 2023-10-06

## 2023-10-06 RX ADMIN — Medication 5 ML: at 10:41

## 2023-10-06 RX ADMIN — SODIUM CHLORIDE 250 ML: 9 INJECTION, SOLUTION INTRAVENOUS at 12:20

## 2023-10-06 RX ADMIN — DIPHENHYDRAMINE HYDROCHLORIDE 25 MG: 25 CAPSULE ORAL at 11:15

## 2023-10-06 RX ADMIN — HYDROMORPHONE HYDROCHLORIDE 1 MG: 1 INJECTION, SOLUTION INTRAMUSCULAR; INTRAVENOUS; SUBCUTANEOUS at 11:09

## 2023-10-06 RX ADMIN — ONDANSETRON 8 MG: 8 TABLET, ORALLY DISINTEGRATING ORAL at 11:15

## 2023-10-06 RX ADMIN — SODIUM CHLORIDE 300 MG: 9 INJECTION, SOLUTION INTRAVENOUS at 12:20

## 2023-10-06 RX ADMIN — Medication 5 ML: at 14:19

## 2023-10-06 RX ADMIN — SODIUM CHLORIDE, POTASSIUM CHLORIDE, SODIUM LACTATE AND CALCIUM CHLORIDE 1000 ML: 600; 310; 30; 20 INJECTION, SOLUTION INTRAVENOUS at 11:07

## 2023-10-06 RX ADMIN — HYDROMORPHONE HYDROCHLORIDE 1 MG: 1 INJECTION, SOLUTION INTRAMUSCULAR; INTRAVENOUS; SUBCUTANEOUS at 13:22

## 2023-10-06 RX ADMIN — HYDROMORPHONE HYDROCHLORIDE 1 MG: 1 INJECTION, SOLUTION INTRAMUSCULAR; INTRAVENOUS; SUBCUTANEOUS at 12:14

## 2023-10-06 ASSESSMENT — PAIN SCALES - GENERAL: PAINLEVEL: EXTREME PAIN (9)

## 2023-10-06 NOTE — PATIENT INSTRUCTIONS
[facility] Triage and after hours / weekends / holidays:  [phone]    Please call the triage or after hours line if you experience a temperature greater than or equal to 100.4, shaking chills, have uncontrolled nausea, vomiting and/or diarrhea, dizziness, shortness of breath, chest pain, bleeding, unexplained bruising, or if you have any other new/concerning symptoms, questions or concerns.      If you are having any concerning symptoms or wish to speak to a provider before your next infusion visit, please call your care coordinator or triage to notify them so we can adequately serve you.     If you need a refill on a narcotic prescription or other medication, please call before your infusion appointment.

## 2023-10-06 NOTE — TELEPHONE ENCOUNTER
Oncology Nurse Triage - Sickle Cell Pain Crisis:    Situation: Jennifer  calling about Sickle Cell Pain Crisis, requesting to be added on for IV fluids and pain medicine    Background:     Patient's last infusion was 10/2/23  Last clinic visit date:10/2/23 Dr Duncan   Does patient have active treatment plan?  Yes      Assessment of Symptoms:  Onset/Duration of symptoms: 1 day    Is it typical sickle cell pain? Yes   Location: back   Character: Sharp           Intensity: 10/10    Any radiation of pain, numbness, tingling, weakness, warmth, swelling, discoloration of arms or legs?No     Fever?No  (if yes max temperature recorded in last 24 hours):      Chest Pain Present: No     Shortness of breath: No     Other home therapies tried: HEAT/HEATING PAD and WARM BATH     Last home medication taken and when: 6:00am oxycodone     Any Refills Needed?: Yes Oxycodone     Does patient have transportation & length of time to get to clinic: Yes Has transportation set up for Jr appointment   Would like to add IVF/PM to jr infusion?       Recommendations:     Placed on infusion call list   7:30 [employer] secure chat sent to Patricia Mantilla   OK per Patricia Mantilla to add IVF/PM to Jr infusion.   OK per [facility] infusion nurses to add IVF/PM to jr infusion.     Call placed to Jennifer to let her know that we can add IVF/Pain Meds to her jr infusion today.     If you do not hear from the infusion center by 2pm then you will not be able to get in for an infusion today. If symptoms worsen while waiting for call back, and/or you experience fever, chills, SOB, chest pain, cough, n/v, dizziness, numbness, swelling, discoloration of extremities, then seek emergency evaluation in Emergency Department.     Please note, if you are late for your appt, you risk losing your infusion appt as it may delay another patient's infusion who arrived on time.

## 2023-10-06 NOTE — PROGRESS NOTES
Infusion Nursing Note:  Jennifer Cervantes presents today for crizanlizumab, IV fluids + pain medications.    Patient seen by provider today: No, visit with Dr. Duncan on 10/02   present during visit today: Not Applicable.    Note: Jennifer presents today with 10/10 low back pain that started last night after work. She reports that she has been using heat therapy and oxycodone without relief. Her last dose of oxycodone was around 0600 this morning, with little/no relief. Denies fevers/chills. Denies nausea/vomiting. Denies chest pain, SOB, dizziness/lightheadedness, or cough. Offers no other concerns at today's visit.    At the end of infusion, Jennifer reports her pain as 4/10. She feels comfortable going home to further manage her pain. She has a ride set up. She will contact clinic triage or report to ED if pain becomes unmanageable at home.      Intravenous Access:  Implanted Port.    Treatment Conditions:     Latest Reference Range & Units 10/06/23 10:46   Sodium 135 - 145 mmol/L 140   Potassium 3.4 - 5.3 mmol/L 3.7   Chloride 98 - 107 mmol/L 108 (H)   Carbon Dioxide (CO2) 22 - 29 mmol/L 22   Urea Nitrogen 6.0 - 20.0 mg/dL 4.0 (L)   Creatinine 0.51 - 0.95 mg/dL 0.51   GFR Estimate >60 mL/min/1.73m2 >90   Calcium 8.6 - 10.0 mg/dL 9.0   Anion Gap 7 - 15 mmol/L 10   Glucose 70 - 99 mg/dL 89   WBC 4.0 - 11.0 10e3/uL 9.2   Hemoglobin 11.7 - 15.7 g/dL 8.3 (L)   Hematocrit 35.0 - 47.0 % 23.5 (L)   Platelet Count 150 - 450 10e3/uL 412   RBC Count 3.80 - 5.20 10e6/uL 2.81 (L)   MCV 78 - 100 fL 84   MCH 26.5 - 33.0 pg 29.5   MCHC 31.5 - 36.5 g/dL 35.3   RDW 10.0 - 15.0 % 24.9 (H)   % Neutrophils % 69   % Lymphocytes % 18   % Monocytes % 7   % Eosinophils % 5   % Basophils % 1   Absolute Basophils 0.0 - 0.2 10e3/uL 0.0   Absolute Eosinophils 0.0 - 0.7 10e3/uL 0.6   Absolute Immature Granulocytes <=0.4 10e3/uL 0.0   Absolute Lymphocytes 0.8 - 5.3 10e3/uL 1.8   Absolute Monocytes 0.0 - 1.3 10e3/uL 0.6   % Immature  Granulocytes % 0   Absolute Neutrophils 1.6 - 8.3 10e3/uL 6.6   Absolute NRBCs 10e3/uL 0.2   NRBCs per 100 WBC <1 /100 2 (H)   RBC Morphology  Confirmed RBC Indices   Platelet Morphology Automated Count Confirmed. Platelet morphology is normal.  Automated Count Confirmed. Platelet morphology is normal.   Polychromasia None Seen  Moderate !   RBC Fragments None Seen  Slight !   Sickle Cells None Seen  Moderate !   Target Cells None Seen  Slight !   Hurt Cells None Seen  Slight !   % Retic 0.5 - 2.0 % 19.3 (H)   Absolute Retic 0.025 - 0.095 10e6/uL 0.543 (H)     Biological Infusion Checklist:  ~~~ NOTE: If the patient answers yes to any of the questions below, hold the infusion and contact ordering provider or on-call provider.    Have you recently had an elevated temperature, fever, chills, productive cough, coughing for 3 weeks or longer or hemoptysis,  abnormal vital signs, night sweats,  chest pain or have you noticed a decrease in your appetite, unexplained weight loss or fatigue? No  Do you have any open wounds or new incisions? No  Do you have any upcoming hospitalizations or surgeries? Does not include esophagogastroduodenoscopy, colonoscopy, endoscopic retrograde cholangiopancreatography (ERCP), endoscopic ultrasound (EUS), dental procedures or joint aspiration/steroid injections No  Do you currently have any signs of illness or infection or are you on any antibiotics? No  Have you had any new, sudden or worsening abdominal pain? No  Have you or anyone in your household received a live vaccination in the past 4 weeks? Please note: No live vaccines while on biologic/chemotherapy until 6 months after the last treatment. Patient can receive the flu vaccine (shot only), pneumovax and the Covid vaccine. It is optimal for the patient to get these vaccines mid cycle, but they can be given at any time as long as it is not on the day of the infusion. No  Have you recently been diagnosed with any new nervous system  diseases (ie. Multiple sclerosis, Guillain Comstock, seizures, neurological changes) or cancer diagnosis? Are you on any form of radiation or chemotherapy? No  Are you pregnant or breast feeding or do you have plans of pregnancy in the future? No  Have you been having any signs of worsening depression or suicidal ideations?  (benlysta only) No - not applicable  Have there been any other new onset medical symptoms? No  Have you had any new blood clots? (IVIG only) No - not applicable      Post Infusion Assessment:  Patient tolerated infusion without incident.  Blood return noted pre and post infusion.  Site patent and intact, free from redness, edema or discomfort.  No evidence of extravasations.  Access discontinued per protocol.  Biologic Infusion Post Education: Call the triage nurse at your clinic or seek medical attention if you have chills and/or temperature greater than or equal to 100.5, uncontrolled nausea/vomiting, diarrhea, constipation, dizziness, shortness of breath, chest pain, heart palpitations, weakness or any other new or concerning symptoms, questions or concerns.  You cannot have any live virus vaccines prior to or during treatment or up to 6 months post infusion.  If you have an upcoming surgery, medical procedure or dental procedure during treatment, this should be discussed with your ordering physician and your surgeon/dentist.  If you are having any concerning symptom, if you are unsure if you should get your next infusion or wish to speak to a provider before your next infusion, please call your care coordinator or triage nurse at your clinic to notify them so we can adequately serve you.       Discharge Plan:   Prescription refills given for oxycodone.  Discharge instructions reviewed with: Patient.  Patient and/or family verbalized understanding of discharge instructions and all questions answered.  AVS to patient via [employer].  Patient will return 11/03 for next infusion appointment.   Patient  discharged in stable condition accompanied by: self.  Departure Mode: Ambulatory.      Gretchen Cruz RN

## 2023-10-06 NOTE — TELEPHONE ENCOUNTER
Narcotic Refill Request    Medication(s) requested:  Oxycodone   Person Requesting Refill:Jennifer   What pain is the medication treating: sickle cell pain   How is the medication being taken?:Takes every 6 hours   Does pt have enough for today? Will be out today   Is pain being adequately controlled on the current regimen?: yes   Experiencing any side effects from medication?: no    Date of most recent appointment:  10/2/23   Any No Show Visits:No   Next appointment:   11/3/23 Patricia Mantilla   Last fill date and by whom:  10/2/23 DR Duncan    Reviewed: No access     Send to provider: Patricia Mantilla

## 2023-10-10 ENCOUNTER — INFUSION THERAPY VISIT (OUTPATIENT)
Dept: INFUSION THERAPY | Facility: CLINIC | Age: 23
End: 2023-10-10
Attending: PEDIATRICS
Payer: COMMERCIAL

## 2023-10-10 ENCOUNTER — NURSE TRIAGE (OUTPATIENT)
Dept: ONCOLOGY | Facility: CLINIC | Age: 23
End: 2023-10-10
Payer: COMMERCIAL

## 2023-10-10 VITALS
RESPIRATION RATE: 16 BRPM | TEMPERATURE: 98.1 F | OXYGEN SATURATION: 99 % | HEART RATE: 89 BPM | DIASTOLIC BLOOD PRESSURE: 71 MMHG | SYSTOLIC BLOOD PRESSURE: 108 MMHG

## 2023-10-10 DIAGNOSIS — G81.10 SPASTIC HEMIPLEGIA, UNSPECIFIED ETIOLOGY, UNSPECIFIED LATERALITY (H): ICD-10-CM

## 2023-10-10 DIAGNOSIS — D57.00 SICKLE CELL PAIN CRISIS (H): Primary | ICD-10-CM

## 2023-10-10 PROCEDURE — 250N000013 HC RX MED GY IP 250 OP 250 PS 637: Performed by: PEDIATRICS

## 2023-10-10 PROCEDURE — 250N000011 HC RX IP 250 OP 636: Mod: JZ | Performed by: PEDIATRICS

## 2023-10-10 PROCEDURE — 96361 HYDRATE IV INFUSION ADD-ON: CPT

## 2023-10-10 PROCEDURE — 258N000003 HC RX IP 258 OP 636: Performed by: PEDIATRICS

## 2023-10-10 PROCEDURE — 96374 THER/PROPH/DIAG INJ IV PUSH: CPT

## 2023-10-10 PROCEDURE — 96376 TX/PRO/DX INJ SAME DRUG ADON: CPT

## 2023-10-10 RX ORDER — ONDANSETRON 8 MG/1
8 TABLET, ORALLY DISINTEGRATING ORAL
Status: COMPLETED | OUTPATIENT
Start: 2023-10-10 | End: 2023-10-10

## 2023-10-10 RX ORDER — DIPHENHYDRAMINE HCL 25 MG
25 CAPSULE ORAL
Status: COMPLETED | OUTPATIENT
Start: 2023-10-10 | End: 2023-10-10

## 2023-10-10 RX ORDER — HEPARIN SODIUM,PORCINE 10 UNIT/ML
5 VIAL (ML) INTRAVENOUS
Status: CANCELLED | OUTPATIENT
Start: 2024-01-01

## 2023-10-10 RX ORDER — HEPARIN SODIUM (PORCINE) LOCK FLUSH IV SOLN 100 UNIT/ML 100 UNIT/ML
5 SOLUTION INTRAVENOUS
Status: DISCONTINUED | OUTPATIENT
Start: 2023-10-10 | End: 2023-10-10 | Stop reason: HOSPADM

## 2023-10-10 RX ORDER — ONDANSETRON 4 MG/1
8 TABLET, FILM COATED ORAL
Status: CANCELLED
Start: 2024-01-01

## 2023-10-10 RX ORDER — HEPARIN SODIUM (PORCINE) LOCK FLUSH IV SOLN 100 UNIT/ML 100 UNIT/ML
5 SOLUTION INTRAVENOUS
Status: CANCELLED | OUTPATIENT
Start: 2024-01-01

## 2023-10-10 RX ORDER — DIPHENHYDRAMINE HCL 25 MG
25 CAPSULE ORAL
Status: CANCELLED
Start: 2024-01-01

## 2023-10-10 RX ADMIN — SODIUM CHLORIDE, POTASSIUM CHLORIDE, SODIUM LACTATE AND CALCIUM CHLORIDE 1000 ML: 600; 310; 30; 20 INJECTION, SOLUTION INTRAVENOUS at 10:32

## 2023-10-10 RX ADMIN — HYDROMORPHONE HYDROCHLORIDE 1 MG: 1 INJECTION, SOLUTION INTRAMUSCULAR; INTRAVENOUS; SUBCUTANEOUS at 12:31

## 2023-10-10 RX ADMIN — HYDROMORPHONE HYDROCHLORIDE 1 MG: 1 INJECTION, SOLUTION INTRAMUSCULAR; INTRAVENOUS; SUBCUTANEOUS at 11:32

## 2023-10-10 RX ADMIN — Medication 5 ML: at 12:37

## 2023-10-10 RX ADMIN — HYDROMORPHONE HYDROCHLORIDE 1 MG: 1 INJECTION, SOLUTION INTRAMUSCULAR; INTRAVENOUS; SUBCUTANEOUS at 10:34

## 2023-10-10 RX ADMIN — ONDANSETRON 8 MG: 8 TABLET, ORALLY DISINTEGRATING ORAL at 10:38

## 2023-10-10 RX ADMIN — DIPHENHYDRAMINE HYDROCHLORIDE 25 MG: 25 CAPSULE ORAL at 10:34

## 2023-10-10 NOTE — TELEPHONE ENCOUNTER
Oncology Nurse Triage - Sickle Cell Pain Crisis:  Situation: Jennifer  calling about Sickle Cell Pain Crisis, requesting to be added on for IV fluids and pain medicine    Background:   Patient's last infusion was 10/6/23   Last clinic visit date:10/2/23 with Dr. Duncan  Does patient have active treatment plan?  Yes    Assessment of Symptoms:  Onset/Duration of symptoms: 2 day    Is it typical sickle cell pain? Yes   Location: back pain, whole back  Character: Sharp           Intensity: 10/10    Any radiation of pain, numbness, tingling, weakness, warmth, swelling, discoloration of arms or legs?No     Fever?No  Chest Pain Present: No   Shortness of breath: No     Other home therapies tried: HEAT/HEATING PAD and WARM BATH   Last home medication taken and when: 0600 took oxycodone and all other meds    Any Refills Needed?: No     Does patient have transportation & length of time to get to clinic: No Has eye apt at 10 O'clock and has medical ride for that apt; apt is in [place]. Is about one hour long. Call pt and will determine if she needs transportation at that time.    Recommendations:   Added to Infusion Wait list.    If you do not hear from the infusion center by 2pm then you will not be able to get in for an infusion today. If symptoms worsen while waiting for call back, and/or you experience fever, chills, SOB, chest pain, cough, n/v, dizziness, numbness, swelling, discoloration of extremities, then seek emergency evaluation in Emergency Department.   Pt voiced understanding of this information.    0900: [facility] can offer 1030 apt   Called Jennifer who confirmed she can come to apt and has her own transportation to the infusion apt at 1030.  Updated Charge Nurse.  Message sent to [facility]OD to schedule pt.     Routed to Dr. Duncan and Arely Krueger, JOECC

## 2023-10-10 NOTE — PROGRESS NOTES
Jennifer wondering where infusion available.   This writer updated Jennifer, still waiting on infusion openings as of 9:55am.      Rx Auth received from the [facility] pharmacy to RF pt's Vitamin D, Ergocalciferol, 1.25 mg (50,000 units) capsule--take 1 tab po 1 day per week. #12   Last Rx was on 6-26-23 for #12 RF0  Last seen by PCP on 12-19-22 for CPE  Next scheduled appt is on 12-27-23 for a 1 year f/u  *Has lab appointment on 21-21-23.        PASSED  Medication: Vitamin D, Ergocalciferol, 1.25 mg (50,000 units) capsule  Last office visit date: 12-19-22  Next appointment scheduled?: Yes   Number of refills given: #12 RF0

## 2023-10-10 NOTE — PROGRESS NOTES
Infusion Nursing Note:  Jennifer Cervantes presents today for IVF, pain meds, antiemetics.    Patient seen by provider today: No   present during visit today: Not Applicable.    Note: Patient presents with 9/10 sickle cell pain. Pain goal 5/10. 3 doses dilaudid administered per orders. Patient reports pain 4/10 at discharge which she states is tolerable.    Intravenous Access:  Implanted Port.    Treatment Conditions:  Not Applicable.    Post Infusion Assessment:  Patient tolerated infusion without incident.  Blood return noted pre and post infusion.  Site patent and intact, free from redness, edema or discomfort.  No evidence of extravasations.  Access discontinued per protocol.     Discharge Plan:   Discharge instructions reviewed with: Patient.  Patient and/or family verbalized understanding of discharge instructions and all questions answered.  AVS to patient via [employer].  Patient will return PRN for next appointment.   Patient discharged in stable condition accompanied by: self.  Departure Mode: Ambulatory.    Administrations This Visit       diphenhydrAMINE (BENADRYL) capsule 25 mg       Admin Date  10/10/2023 Action  $Given Dose  25 mg Route  Oral Administered By  Claudia Landry RN              heparin 100 unit/mL injection 5 mL       Admin Date  10/10/2023 Action  $Given Dose  5 mL Route  Intracatheter Administered By  Claudia Landry RN              HYDROmorphone (DILAUDID) injection 1 mg       Admin Date  10/10/2023 Action  $Given Dose  1 mg Route  Intravenous Administered By  Claudia Landry RN               Admin Date  10/10/2023 Action  $Given Dose  1 mg Route  Intravenous Administered By  Claudia Landry RN               Admin Date  10/10/2023 Action  $Given Dose  1 mg Route  Intravenous Administered By  Tricia Adame RN              lactated ringers BOLUS 1,000 mL       Admin Date  10/10/2023 Action  $New Bag Dose  1,000 mL Rate  500 mL/hr Route  Intravenous Administered By  Frantz  Claudia, JOE              ondansetron (ZOFRAN ODT) ODT tab 8 mg       Admin Date  10/10/2023 Action  $Given Dose  8 mg Route  Oral Administered By  Claudia Landry, JOE Landry, JOE

## 2023-10-10 NOTE — PATIENT INSTRUCTIONS
Dear Jennifer Cervantes    Thank you for choosing [facility] ([facility]) for your infusion.  The following information is a summary of our appointment as well as important reminders.      If you are a transplant patient and require transplant education, please click on this link: [email or web address].    We look forward in seeing you on your next appointment here at [facility] ([facility]).  Please don t hesitate to call us at [phone] to reschedule any of your appointments or to speak with one of the [facility] registered nurses.  It was a pleasure taking care of you today.    Sincerely,    [facility]  [address] [place] [address]  [id number]  Phone:  [phone]

## 2023-10-12 ENCOUNTER — INFUSION THERAPY VISIT (OUTPATIENT)
Dept: TRANSPLANT | Facility: CLINIC | Age: 23
End: 2023-10-12
Attending: PEDIATRICS
Payer: COMMERCIAL

## 2023-10-12 ENCOUNTER — NURSE TRIAGE (OUTPATIENT)
Dept: ONCOLOGY | Facility: CLINIC | Age: 23
End: 2023-10-12
Payer: COMMERCIAL

## 2023-10-12 ENCOUNTER — OFFICE VISIT (OUTPATIENT)
Dept: ONCOLOGY | Facility: CLINIC | Age: 23
End: 2023-10-12
Attending: STUDENT IN AN ORGANIZED HEALTH CARE EDUCATION/TRAINING PROGRAM
Payer: COMMERCIAL

## 2023-10-12 VITALS
TEMPERATURE: 98.5 F | SYSTOLIC BLOOD PRESSURE: 132 MMHG | HEART RATE: 101 BPM | OXYGEN SATURATION: 92 % | DIASTOLIC BLOOD PRESSURE: 90 MMHG | WEIGHT: 147.4 LBS | BODY MASS INDEX: 25.3 KG/M2

## 2023-10-12 DIAGNOSIS — D57.00 SICKLE CELL PAIN CRISIS (H): ICD-10-CM

## 2023-10-12 DIAGNOSIS — I69.351 HEMIPLEGIA AND HEMIPARESIS FOLLOWING CEREBRAL INFARCTION AFFECTING RIGHT DOMINANT SIDE (H): ICD-10-CM

## 2023-10-12 DIAGNOSIS — D57.00 SICKLE CELL PAIN CRISIS (H): Primary | ICD-10-CM

## 2023-10-12 DIAGNOSIS — G81.10 SPASTIC HEMIPLEGIA, UNSPECIFIED ETIOLOGY, UNSPECIFIED LATERALITY (H): Primary | ICD-10-CM

## 2023-10-12 DIAGNOSIS — G81.10 SPASTIC HEMIPLEGIA, UNSPECIFIED ETIOLOGY, UNSPECIFIED LATERALITY (H): ICD-10-CM

## 2023-10-12 PROCEDURE — 258N000003 HC RX IP 258 OP 636: Performed by: PEDIATRICS

## 2023-10-12 PROCEDURE — 64644 CHEMODENERV 1 EXTREM 5/> MUS: CPT | Mod: GC | Performed by: STUDENT IN AN ORGANIZED HEALTH CARE EDUCATION/TRAINING PROGRAM

## 2023-10-12 PROCEDURE — 250N000013 HC RX MED GY IP 250 OP 250 PS 637: Performed by: PEDIATRICS

## 2023-10-12 PROCEDURE — 250N000020 HC RX IP 250 OP 636 J0585: Mod: JZ | Performed by: PEDIATRICS

## 2023-10-12 PROCEDURE — 250N000011 HC RX IP 250 OP 636: Performed by: PEDIATRICS

## 2023-10-12 PROCEDURE — 96361 HYDRATE IV INFUSION ADD-ON: CPT

## 2023-10-12 PROCEDURE — 96374 THER/PROPH/DIAG INJ IV PUSH: CPT

## 2023-10-12 PROCEDURE — 95874 GUIDE NERV DESTR NEEDLE EMG: CPT | Performed by: STUDENT IN AN ORGANIZED HEALTH CARE EDUCATION/TRAINING PROGRAM

## 2023-10-12 PROCEDURE — 96376 TX/PRO/DX INJ SAME DRUG ADON: CPT

## 2023-10-12 RX ORDER — ONDANSETRON 8 MG/1
8 TABLET, ORALLY DISINTEGRATING ORAL
Status: COMPLETED | OUTPATIENT
Start: 2023-10-12 | End: 2023-10-12

## 2023-10-12 RX ORDER — HEPARIN SODIUM,PORCINE 10 UNIT/ML
5 VIAL (ML) INTRAVENOUS
Status: CANCELLED | OUTPATIENT
Start: 2024-01-01

## 2023-10-12 RX ORDER — HEPARIN SODIUM (PORCINE) LOCK FLUSH IV SOLN 100 UNIT/ML 100 UNIT/ML
5 SOLUTION INTRAVENOUS ONCE
Status: COMPLETED | OUTPATIENT
Start: 2023-10-12 | End: 2023-10-12

## 2023-10-12 RX ORDER — KETOROLAC TROMETHAMINE 30 MG/ML
30 INJECTION, SOLUTION INTRAMUSCULAR; INTRAVENOUS ONCE
Status: CANCELLED
Start: 2024-01-01 | End: 2024-01-01

## 2023-10-12 RX ORDER — ONDANSETRON 4 MG/1
8 TABLET, FILM COATED ORAL
Status: CANCELLED
Start: 2024-01-01

## 2023-10-12 RX ORDER — OXYCODONE HYDROCHLORIDE 15 MG/1
15 TABLET ORAL EVERY 4 HOURS PRN
Qty: 10 TABLET | Refills: 0 | Status: SHIPPED | OUTPATIENT
Start: 2023-10-12 | End: 2023-10-13

## 2023-10-12 RX ORDER — DIPHENHYDRAMINE HCL 25 MG
25 CAPSULE ORAL
Status: CANCELLED
Start: 2024-01-01

## 2023-10-12 RX ORDER — KETOROLAC TROMETHAMINE 30 MG/ML
30 INJECTION, SOLUTION INTRAMUSCULAR; INTRAVENOUS ONCE
Status: CANCELLED
Start: 2023-10-12 | End: 2023-10-12

## 2023-10-12 RX ORDER — HEPARIN SODIUM (PORCINE) LOCK FLUSH IV SOLN 100 UNIT/ML 100 UNIT/ML
5 SOLUTION INTRAVENOUS
Status: CANCELLED | OUTPATIENT
Start: 2024-01-01

## 2023-10-12 RX ORDER — DIPHENHYDRAMINE HCL 25 MG
25 CAPSULE ORAL
Status: COMPLETED | OUTPATIENT
Start: 2023-10-12 | End: 2023-10-12

## 2023-10-12 RX ADMIN — HYDROMORPHONE HYDROCHLORIDE 1 MG: 1 INJECTION, SOLUTION INTRAMUSCULAR; INTRAVENOUS; SUBCUTANEOUS at 10:35

## 2023-10-12 RX ADMIN — ONDANSETRON 8 MG: 8 TABLET, ORALLY DISINTEGRATING ORAL at 10:32

## 2023-10-12 RX ADMIN — HYDROMORPHONE HYDROCHLORIDE 1 MG: 1 INJECTION, SOLUTION INTRAMUSCULAR; INTRAVENOUS; SUBCUTANEOUS at 12:36

## 2023-10-12 RX ADMIN — ONABOTULINUMTOXINA 300 UNITS: 100 INJECTION, POWDER, LYOPHILIZED, FOR SOLUTION INTRADERMAL; INTRAMUSCULAR at 09:48

## 2023-10-12 RX ADMIN — Medication 5 ML: at 12:37

## 2023-10-12 RX ADMIN — DIPHENHYDRAMINE HYDROCHLORIDE 25 MG: 25 CAPSULE ORAL at 10:32

## 2023-10-12 RX ADMIN — SODIUM CHLORIDE, POTASSIUM CHLORIDE, SODIUM LACTATE AND CALCIUM CHLORIDE 1000 ML: 600; 310; 30; 20 INJECTION, SOLUTION INTRAVENOUS at 10:31

## 2023-10-12 RX ADMIN — HYDROMORPHONE HYDROCHLORIDE 1 MG: 1 INJECTION, SOLUTION INTRAMUSCULAR; INTRAVENOUS; SUBCUTANEOUS at 11:39

## 2023-10-12 ASSESSMENT — PAIN SCALES - GENERAL: PAINLEVEL: WORST PAIN (10)

## 2023-10-12 NOTE — TELEPHONE ENCOUNTER
Narcotic Refill Request    Medication(s) requested:  oxycodone 15 mg tablet  Person Requesting Refill:Jennifer  What pain is the medication treating: sickle cell pain  How is the medication being taken?:as written, 1 tab Q 4H as needed  Does pt have enough for today? Yes, she is due for refill tomorrow and would like to pick it up tomorrow.  Is pain being adequately controlled on the current regimen?: yes  Experiencing any side effects from medication?: no    Date of most recent appointment:  10/2/23 with Dr. Duncan  Any No Show Visits:no  Next appointment:   11/3/23 with Patricia Mantilla CNP  Last fill date and by whom:  10/6/23 by Patricia Mantilla CNP   Reviewed: No    Routed/Paged provider: Eric Duncan MD

## 2023-10-12 NOTE — PROGRESS NOTES
Infusion Nursing Note:  Jennifer Cervantes presents today for IV fluids and pain medications.    Patient seen by provider today: Yes: Dr Pierce   present during visit today: Not Applicable.    Note:   Pt received a liter of LR at 500 ml/hr for two hours.    Pt received benadryl 25 mg and zofran 8 mg po as premeds.    Pt received 1 mg dilaudid IV every hour for a total of three doses (3 mg dilaudid total).      Intravenous Access:  Implanted Port.    Treatment Conditions:  Not Applicable.      Post Infusion Assessment:  Patient tolerated infusion without incident.  Blood return noted pre and post infusion.  Site patent and intact, free from redness, edema or discomfort.  No evidence of extravasations.  Access discontinued per protocol.       Discharge Plan:   Discharge instructions reviewed with: Patient.  Patient and/or family verbalized understanding of discharge instructions and all questions answered.  Patient discharged in stable condition accompanied by: self.  Departure Mode: Ambulatory.      Jamaica Napoles RN

## 2023-10-12 NOTE — NURSING NOTE
"Oncology Rooming Note    October 12, 2023 8:54 AM   Jennifer Cervantes is a 23 year old female who presents for:    Chief Complaint   Patient presents with    Oncology Clinic Visit     Sickle cell pain      Initial Vitals: BP (!) 132/90 (BP Location: Right arm, Patient Position: Sitting, Cuff Size: Adult Regular)   Pulse 101   Temp 98.5  F (36.9  C) (Oral)   Wt 66.9 kg (147 lb 6.4 oz)   SpO2 92%   BMI 25.30 kg/m   Estimated body mass index is 25.3 kg/m  as calculated from the following:    Height as of 9/22/23: 1.626 m (5' 4\").    Weight as of this encounter: 66.9 kg (147 lb 6.4 oz). Body surface area is 1.74 meters squared.  Worst Pain (10) Comment: Data Unavailable   No LMP recorded. Patient has had an implant.  Allergies reviewed: Yes  Medications reviewed: Yes    Medications: Medication refills not needed today.  Pharmacy name entered into [employer]: [facility] [place], [place] - [address] [place] [address] [phone]    Clinical concerns:  none      Tiffany Robertson              "

## 2023-10-12 NOTE — NURSING NOTE
Chief Complaint   Patient presents with    Infusion     Add on infusion appointment for IV fluids and pain medications.     Staci Napoles RN

## 2023-10-12 NOTE — TELEPHONE ENCOUNTER
Oncology Nurse Triage - Sickle Cell Pain Crisis:  Situation: Jennifer  calling about Sickle Cell Pain Crisis, requesting to be added on for IV fluids and pain medicine    Background:   Patient's last infusion was 10/10/23   Last clinic visit date:10/2/23 with Dr. Duncan  Next clinic visit date: 11/3/23 with Patricia Mantilla CNP  Does patient have active treatment plan?  Yes    Assessment of Symptoms:  Onset/Duration of symptoms: 1 day    Is it typical sickle cell pain? Yes   Location: back  Character: Sharp and Burning           Intensity: 10/10  Any radiation of pain, numbness, tingling, weakness, warmth, swelling, discoloration of arms or legs?No     Fever?No  Chest Pain Present: No   Shortness of breath: No     Other home therapies tried: HEAT/HEATING PAD and WARM BATH   Last home medication taken and when: 0600 oxycodone    Any Refills Needed?: No     Does patient have transportation & length of time to get to clinic: Has appointment at 0845 with Dr. Pierce; if she can get scheduled after apt w/Dr. Pierce then she has a ride and will not need transportation assistance.    Recommendations:   Added to infusion wait list.    If you do not hear from the infusion center by 2pm then you will not be able to get in for an infusion today. If symptoms worsen while waiting for call back, and/or you experience fever, chills, SOB, chest pain, cough, n/v, dizziness, numbness, swelling, discoloration of extremities, then seek emergency evaluation in Emergency Department.     0735: [facility] can offer apt to Jennifer at 1000 after apt with Dr. Pierce.   Called Jennifer who confirmed she can come to apt at 1000.  Updated [facility] Charge Nurse.  Message sent to [facility]OD to schedule.    Routed to Dr. Duncan and Arely Krueger, JOECC

## 2023-10-12 NOTE — LETTER
10/12/2023         RE: Jennifer Cervantes  [address]  [facility] [address]        Dear Colleague,    Thank you for referring your patient, Jennifer Cervantes, to the [facility]. Please see a copy of my visit note below.    PM&R Botox Procedure Note    Chief Complaint: Spastic Hemiparesis    BP (!) 132/90 (BP Location: Right arm, Patient Position: Sitting, Cuff Size: Adult Regular)   Pulse 101   Temp 98.5  F (36.9  C) (Oral)   Wt 66.9 kg (147 lb 6.4 oz)   SpO2 92%   BMI 25.30 kg/m         Current Outpatient Medications:     acetaminophen (TYLENOL) 325 MG tablet, Take 2 tablets (650 mg) by mouth every 6 hours as needed for mild pain, Disp: 120 tablet, Rfl: 3    albuterol (PROAIR HFA/PROVENTIL HFA/VENTOLIN HFA) 108 (90 Base) MCG/ACT inhaler, Inhale 2 puffs into the lungs every 6 hours as needed for shortness of breath or wheezing, Disp: 8.5 g, Rfl: 3    albuterol (PROVENTIL) (2.5 MG/3ML) 0.083% neb solution, Take 2 vials (5 mg) by nebulization every 6 hours as needed for shortness of breath or wheezing, Disp: 90 mL, Rfl: 3    aspirin (ASA) 81 MG chewable tablet, Take 1 tablet (81 mg) by mouth 2 times daily, Disp: 60 tablet, Rfl: 11    budesonide-formoterol (SYMBICORT) 160-4.5 MCG/ACT Inhaler, Inhale 2 puffs into the lungs 2 times daily, Disp: 10.2 g, Rfl: 3    cetirizine (ZYRTEC) 10 MG tablet, Take 1 tablet (10 mg) by mouth daily, Disp: 30 tablet, Rfl: 1    deferasirox (JADENU) 360 MG tablet, Take 4 tablets (1,440 mg) by mouth every evening, Disp: 120 tablet, Rfl: 4    diphenhydrAMINE (BENADRYL) 25 MG capsule, Take 1 capsule (25 mg) by mouth every 6 hours as needed for itching or allergies, Disp: 30 capsule, Rfl: 0    Hydroxyurea 1000 MG TABS, Take 3,000 mg by mouth daily, Disp: 90 tablet, Rfl: 3    oxyCODONE IR (ROXICODONE) 15 MG tablet, Take 1 tablet (15 mg) by mouth every 4 hours as needed for severe pain Goal 4 per day. Max 6 per day., Disp: 10 tablet, Rfl: 0    predniSONE  (DELTASONE) 20 MG tablet, Take 1 tablet (20 mg) by mouth daily, Disp: 3 tablet, Rfl: 0    EPINEPHrine (ANY BX GENERIC EQUIV) 0.3 MG/0.3ML injection 2-pack, Inject 0.3 mLs (0.3 mg) into the muscle as needed for anaphylaxis (Patient not taking: Reported on 9/29/2023), Disp: 1 each, Rfl: 1    naloxone (NARCAN) 4 MG/0.1ML nasal spray, Spray 4 mg into one nostril alternating nostrils as needed for opioid reversal every 2-3 minutes until assistance arrives (Patient not taking: Reported on 9/29/2023), Disp: , Rfl:     ondansetron (ZOFRAN) 8 MG tablet, Take 1 tablet (8 mg) by mouth every 8 hours as needed (Patient not taking: Reported on 9/29/2023), Disp: 30 tablet, Rfl: 1    Current Facility-Administered Medications:     botulinum toxin type A (BOTOX) 100 units injection 300 Units, 300 Units, Intramuscular, Q90 Days, Eric Duncan MD, 300 Units at 10/12/23 0948    Facility-Administered Medications Ordered in Other Visits:     diphenhydrAMINE (BENADRYL) capsule 25 mg, 25 mg, Oral, Once PRN, Eric Duncan MD    HYDROmorphone (DILAUDID) injection 1 mg, 1 mg, Intravenous, Q1H PRN, Eric Duncan MD    lactated ringers BOLUS 1,000 mL, 1,000 mL, Intravenous, Once, Eric Duncan MD    ondansetron (ZOFRAN ODT) ODT tab 8 mg, 8 mg, Oral, Once PRN, Eric Duncan MD        Allergies   Allergen Reactions    Contrast Dye      Hives and breathing issues    Fish-Derived Products Hives    Seafood Hives    Gadolinium     Iodinated Contrast Media         PHYSICAL EXAM      Motor: 4+/5 with right shoulder abduction, 4/5 with right elbow flexion, unable to assess wrist flexion due to contracture. 4/5 with  strength on right. 5/5 left shoulder abduction, elbow extension, wrist extension and  strength/finger intrinsics. 4/5 with right hip flexion, 4/5 with right knee extension, 3/5 with right ankle dorsiflexion, 4+/5 with right EHL, plantar flexion. 5/5 with all muscle groups in left lower  "extremity.  ROM is 120 degrees with right shoulder abduction, about 130 degrees with right elbow extension.   Tone with MAS- 2/4 with right shoulder abduction, 3/4 with right finger flexors/intrinsics, 2/4 with right knee flexion.Significant right wrist contracture with minimal extension.   Sensory: intact to light touch throughout all dermatomes in bilateral lower extremities.      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      Last seen 23 for last set of injections- injected total of 200 U. Since then, she has had significant improvement in tone improving mobility and ADLs over the last several weeks. Feels a little tighter in her right elbow and with finger flexion.       RESPONSE TO PREVIOUS TREATMENT:  Significant improvement in tone that lasted \"several\" weeks    BOTULINUM NEUROTOXIN INJECTION PROCEDURES:    VERIFICATION OF PATIENT IDENTIFICATION  Responsible Person:  RUIZ  : verified  Full Name: verified    INDICATIONS FOR PROCEDURES:  Jennifer Cervantes is a 21 year old patient with spasticity affecting the right upper extremity and right lower extremity secondary to a diagnosis of sickle cell disease and previous CVA with resulting spastic hemiparesis with associated pain, loss of joint motion, loss of volitional motor control, hygiene difficulty, difficulty with activities of daily living and difficulty with ambulation and transfers.    Her baseline symptoms have been recalcitrant to oral medications and conservative therapy.  She is here today for reinjection with Botox.    GOAL OF PROCEDURE:  The goal of this procedure is to improve increase active range of motion, improve volitional motor control, decrease pain  and enhance functional independence associated with spasticity.    TOTAL DOSE ADMINISTERED:  Increased dose today based on increased tone with MAS with right elbow flexion, finger flexion.    Dose " Administered:  250 units Botox (Botulinum Toxin Type A)       1:1 Dilution     Diluent Used:  Preservative Free Normal Saline  Total Volume of Diluent Used:  2.5 ml  Lot no: [id number]  Exp: 02/2026    CONSENT:  The risks, benefits, and treatment options were discussed with Jennifer Cervantes and she agreed to proceed.    EQUIPMENT USED:  Needle-27mm stimulating/recording  EMG/NCS Machine    SKIN PREPARATION:  Skin preparation was performed using an alcohol wipe.    GUIDANCE DESCRIPTION:  Electro-myographic guidance was necessary throughout the procedure to accurately identify all areas of spastic muscles while avoiding injection of non-spastic muscles and underlying muscles , neighboring nerves and nearby vascular structures.     AREA/MUSCLE INJECTED:  Right biceps- 50 U at 2 sites  Right BR- 50 U  Right pronator teres- 30 U  Right FDS- 45 U  Right FCR- 25 U  Right biceps femoris- 50 U at 2 sites    RESPONSE TO PROCEDURE:  Jennifer Cervantes tolerated the procedure well and there were no immediate complications. She was allowed to recover for an appropriate period of time and was discharged home in stable condition.     Jose Manuel Apple MD  Resident Physician, PM&R    Patient seen and procedure done with Dr. Pierce present and under her supervision. Dr. Piecre agrees with the assessment and plan.    Jennifer Cervantes will follow up by phone with Dr. Pierce for any questions or concerns that may arise.  Jennifer Cervantes will be scheduled for the next series of injections in 12 weeks.    Physician Attestation  I, Katherine Pierce MD, saw this patient and agree with the findings and plan of care as documented in the note.      Items personally reviewed/procedural attestation: vitals, labs, imaging and agree with the interpretation documented in the note and I was present for and supervised the entire procedure.    Katherine Pierce MD

## 2023-10-12 NOTE — PROGRESS NOTES
PM&R Botox Procedure Note    Chief Complaint: Spastic Hemiparesis    BP (!) 132/90 (BP Location: Right arm, Patient Position: Sitting, Cuff Size: Adult Regular)   Pulse 101   Temp 98.5  F (36.9  C) (Oral)   Wt 66.9 kg (147 lb 6.4 oz)   SpO2 92%   BMI 25.30 kg/m         Current Outpatient Medications:     acetaminophen (TYLENOL) 325 MG tablet, Take 2 tablets (650 mg) by mouth every 6 hours as needed for mild pain, Disp: 120 tablet, Rfl: 3    albuterol (PROAIR HFA/PROVENTIL HFA/VENTOLIN HFA) 108 (90 Base) MCG/ACT inhaler, Inhale 2 puffs into the lungs every 6 hours as needed for shortness of breath or wheezing, Disp: 8.5 g, Rfl: 3    albuterol (PROVENTIL) (2.5 MG/3ML) 0.083% neb solution, Take 2 vials (5 mg) by nebulization every 6 hours as needed for shortness of breath or wheezing, Disp: 90 mL, Rfl: 3    aspirin (ASA) 81 MG chewable tablet, Take 1 tablet (81 mg) by mouth 2 times daily, Disp: 60 tablet, Rfl: 11    budesonide-formoterol (SYMBICORT) 160-4.5 MCG/ACT Inhaler, Inhale 2 puffs into the lungs 2 times daily, Disp: 10.2 g, Rfl: 3    cetirizine (ZYRTEC) 10 MG tablet, Take 1 tablet (10 mg) by mouth daily, Disp: 30 tablet, Rfl: 1    deferasirox (JADENU) 360 MG tablet, Take 4 tablets (1,440 mg) by mouth every evening, Disp: 120 tablet, Rfl: 4    diphenhydrAMINE (BENADRYL) 25 MG capsule, Take 1 capsule (25 mg) by mouth every 6 hours as needed for itching or allergies, Disp: 30 capsule, Rfl: 0    Hydroxyurea 1000 MG TABS, Take 3,000 mg by mouth daily, Disp: 90 tablet, Rfl: 3    oxyCODONE IR (ROXICODONE) 15 MG tablet, Take 1 tablet (15 mg) by mouth every 4 hours as needed for severe pain Goal 4 per day. Max 6 per day., Disp: 10 tablet, Rfl: 0    predniSONE (DELTASONE) 20 MG tablet, Take 1 tablet (20 mg) by mouth daily, Disp: 3 tablet, Rfl: 0    EPINEPHrine (ANY BX GENERIC EQUIV) 0.3 MG/0.3ML injection 2-pack, Inject 0.3 mLs (0.3 mg) into the muscle as needed for anaphylaxis (Patient not taking: Reported on  9/29/2023), Disp: 1 each, Rfl: 1    naloxone (NARCAN) 4 MG/0.1ML nasal spray, Spray 4 mg into one nostril alternating nostrils as needed for opioid reversal every 2-3 minutes until assistance arrives (Patient not taking: Reported on 9/29/2023), Disp: , Rfl:     ondansetron (ZOFRAN) 8 MG tablet, Take 1 tablet (8 mg) by mouth every 8 hours as needed (Patient not taking: Reported on 9/29/2023), Disp: 30 tablet, Rfl: 1    Current Facility-Administered Medications:     botulinum toxin type A (BOTOX) 100 units injection 300 Units, 300 Units, Intramuscular, Q90 Days, Eric Duncan MD, 300 Units at 10/12/23 0948    Facility-Administered Medications Ordered in Other Visits:     diphenhydrAMINE (BENADRYL) capsule 25 mg, 25 mg, Oral, Once PRN, Eric Duncan MD    HYDROmorphone (DILAUDID) injection 1 mg, 1 mg, Intravenous, Q1H PRN, Eric Duncan MD    lactated ringers BOLUS 1,000 mL, 1,000 mL, Intravenous, Once, Eric Duncan MD    ondansetron (ZOFRAN ODT) ODT tab 8 mg, 8 mg, Oral, Once PRN, Eric Duncan MD        Allergies   Allergen Reactions    Contrast Dye      Hives and breathing issues    Fish-Derived Products Hives    Seafood Hives    Gadolinium     Iodinated Contrast Media         PHYSICAL EXAM      Motor: 4+/5 with right shoulder abduction, 4/5 with right elbow flexion, unable to assess wrist flexion due to contracture. 4/5 with  strength on right. 5/5 left shoulder abduction, elbow extension, wrist extension and  strength/finger intrinsics. 4/5 with right hip flexion, 4/5 with right knee extension, 3/5 with right ankle dorsiflexion, 4+/5 with right EHL, plantar flexion. 5/5 with all muscle groups in left lower extremity.  ROM is 120 degrees with right shoulder abduction, about 130 degrees with right elbow extension.   Tone with MAS- 2/4 with right shoulder abduction, 3/4 with right finger flexors/intrinsics, 2/4 with right knee flexion.Significant right wrist  "contracture with minimal extension.   Sensory: intact to light touch throughout all dermatomes in bilateral lower extremities.      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      Last seen 23 for last set of injections- injected total of 200 U. Since then, she has had significant improvement in tone improving mobility and ADLs over the last several weeks. Feels a little tighter in her right elbow and with finger flexion.       RESPONSE TO PREVIOUS TREATMENT:  Significant improvement in tone that lasted \"several\" weeks    BOTULINUM NEUROTOXIN INJECTION PROCEDURES:    VERIFICATION OF PATIENT IDENTIFICATION  Responsible Person:  RUIZ  : verified  Full Name: verified    INDICATIONS FOR PROCEDURES:  Jennifer Cervantes is a 21 year old patient with spasticity affecting the right upper extremity and right lower extremity secondary to a diagnosis of sickle cell disease and previous CVA with resulting spastic hemiparesis with associated pain, loss of joint motion, loss of volitional motor control, hygiene difficulty, difficulty with activities of daily living and difficulty with ambulation and transfers.    Her baseline symptoms have been recalcitrant to oral medications and conservative therapy.  She is here today for reinjection with Botox.    GOAL OF PROCEDURE:  The goal of this procedure is to improve increase active range of motion, improve volitional motor control, decrease pain  and enhance functional independence associated with spasticity.    TOTAL DOSE ADMINISTERED:  Increased dose today based on increased tone with MAS with right elbow flexion, finger flexion.    Dose Administered:  250 units Botox (Botulinum Toxin Type A)       1:1 Dilution     Diluent Used:  Preservative Free Normal Saline  Total Volume of Diluent Used:  2.5 ml  Lot no: [id number]  Exp: 2026    CONSENT:  The risks, benefits, and treatment options were " discussed with Jennifer Cervantes and she agreed to proceed.    EQUIPMENT USED:  Needle-27mm stimulating/recording  EMG/NCS Machine    SKIN PREPARATION:  Skin preparation was performed using an alcohol wipe.    GUIDANCE DESCRIPTION:  Electro-myographic guidance was necessary throughout the procedure to accurately identify all areas of spastic muscles while avoiding injection of non-spastic muscles and underlying muscles , neighboring nerves and nearby vascular structures.     AREA/MUSCLE INJECTED:  Right biceps- 50 U at 2 sites  Right BR- 50 U  Right pronator teres- 30 U  Right FDS- 45 U  Right FCR- 25 U  Right biceps femoris- 50 U at 2 sites    RESPONSE TO PROCEDURE:  Jennifer Cervantes tolerated the procedure well and there were no immediate complications. She was allowed to recover for an appropriate period of time and was discharged home in stable condition.     Jose Manuel Apple MD  Resident Physician, PM&R    Patient seen and procedure done with Dr. Pierce present and under her supervision. Dr. Pierce agrees with the assessment and plan.    Jennifer Cervantes will follow up by phone with Dr. Pierce for any questions or concerns that may arise.  Jennifer Cervantes will be scheduled for the next series of injections in 12 weeks.    Physician Attestation   I, Katherine Pierce MD, saw this patient and agree with the findings and plan of care as documented in the note.      Items personally reviewed/procedural attestation: vitals, labs, imaging and agree with the interpretation documented in the note and I was present for and supervised the entire procedure.    Katherine Pierce MD

## 2023-10-13 ENCOUNTER — HOSPITAL ENCOUNTER (EMERGENCY)
Facility: CLINIC | Age: 23
Discharge: HOME | End: 2023-10-13
Attending: EMERGENCY MEDICINE | Admitting: EMERGENCY MEDICINE
Payer: COMMERCIAL

## 2023-10-13 ENCOUNTER — NURSE TRIAGE (OUTPATIENT)
Dept: ONCOLOGY | Facility: CLINIC | Age: 23
End: 2023-10-13
Payer: COMMERCIAL

## 2023-10-13 ENCOUNTER — PATIENT OUTREACH (OUTPATIENT)
Dept: ONCOLOGY | Facility: CLINIC | Age: 23
End: 2023-10-13
Payer: COMMERCIAL

## 2023-10-13 VITALS
RESPIRATION RATE: 16 BRPM | BODY MASS INDEX: 26.46 KG/M2 | DIASTOLIC BLOOD PRESSURE: 75 MMHG | HEIGHT: 64 IN | HEART RATE: 75 BPM | TEMPERATURE: 98.1 F | SYSTOLIC BLOOD PRESSURE: 119 MMHG | WEIGHT: 155 LBS | OXYGEN SATURATION: 92 %

## 2023-10-13 DIAGNOSIS — D57.00 SICKLE CELL PAIN CRISIS (H): Primary | ICD-10-CM

## 2023-10-13 DIAGNOSIS — D57.00 SICKLE CELL PAIN CRISIS (H): ICD-10-CM

## 2023-10-13 LAB
ALBUMIN SERPL BCG-MCNC: 4.6 G/DL (ref 3.5–5.2)
ALP SERPL-CCNC: 78 U/L (ref 35–104)
ALT SERPL W P-5'-P-CCNC: 37 U/L (ref 0–50)
ANION GAP SERPL CALCULATED.3IONS-SCNC: 11 MMOL/L (ref 7–15)
AST SERPL W P-5'-P-CCNC: 62 U/L (ref 0–45)
BASO+EOS+MONOS # BLD AUTO: ABNORMAL 10*3/UL
BASO+EOS+MONOS NFR BLD AUTO: ABNORMAL %
BASOPHILS # BLD AUTO: 0.2 10E3/UL (ref 0–0.2)
BASOPHILS NFR BLD AUTO: 2 %
BILIRUB SERPL-MCNC: 4.7 MG/DL
BUN SERPL-MCNC: 9.2 MG/DL (ref 6–20)
CALCIUM SERPL-MCNC: 9.1 MG/DL (ref 8.6–10)
CHLORIDE SERPL-SCNC: 103 MMOL/L (ref 98–107)
CREAT SERPL-MCNC: 0.56 MG/DL (ref 0.51–0.95)
DEPRECATED HCO3 PLAS-SCNC: 23 MMOL/L (ref 22–29)
EGFRCR SERPLBLD CKD-EPI 2021: >90 ML/MIN/1.73M2
EOSINOPHIL # BLD AUTO: 0.5 10E3/UL (ref 0–0.7)
EOSINOPHIL NFR BLD AUTO: 5 %
ERYTHROCYTE [DISTWIDTH] IN BLOOD BY AUTOMATED COUNT: 26.3 % (ref 10–15)
GLUCOSE SERPL-MCNC: 82 MG/DL (ref 70–99)
HCT VFR BLD AUTO: 22.6 % (ref 35–47)
HGB BLD-MCNC: 8 G/DL (ref 11.7–15.7)
IMM GRANULOCYTES # BLD: 0.1 10E3/UL
IMM GRANULOCYTES NFR BLD: 0 %
LYMPHOCYTES # BLD AUTO: 2.7 10E3/UL (ref 0.8–5.3)
LYMPHOCYTES NFR BLD AUTO: 23 %
MCH RBC QN AUTO: 30.2 PG (ref 26.5–33)
MCHC RBC AUTO-ENTMCNC: 35.4 G/DL (ref 31.5–36.5)
MCV RBC AUTO: 85 FL (ref 78–100)
MONOCYTES # BLD AUTO: 1 10E3/UL (ref 0–1.3)
MONOCYTES NFR BLD AUTO: 8 %
NEUTROPHILS # BLD AUTO: 7.3 10E3/UL (ref 1.6–8.3)
NEUTROPHILS NFR BLD AUTO: 62 %
NRBC # BLD AUTO: 0.4 10E3/UL
NRBC BLD AUTO-RTO: 3 /100
PLATELET # BLD AUTO: 394 10E3/UL (ref 150–450)
POTASSIUM SERPL-SCNC: 3.8 MMOL/L (ref 3.4–5.3)
PROT SERPL-MCNC: 7.5 G/DL (ref 6.4–8.3)
RBC # BLD AUTO: 2.65 10E6/UL (ref 3.8–5.2)
RETICS # AUTO: 0.62 10E6/UL (ref 0.03–0.1)
RETICS/RBC NFR AUTO: 23.6 % (ref 0.5–2)
SODIUM SERPL-SCNC: 137 MMOL/L (ref 135–145)
WBC # BLD AUTO: 11.7 10E3/UL (ref 4–11)

## 2023-10-13 PROCEDURE — 96374 THER/PROPH/DIAG INJ IV PUSH: CPT | Performed by: EMERGENCY MEDICINE

## 2023-10-13 PROCEDURE — 250N000011 HC RX IP 250 OP 636: Mod: JZ | Performed by: EMERGENCY MEDICINE

## 2023-10-13 PROCEDURE — 96361 HYDRATE IV INFUSION ADD-ON: CPT | Performed by: EMERGENCY MEDICINE

## 2023-10-13 PROCEDURE — 96376 TX/PRO/DX INJ SAME DRUG ADON: CPT | Performed by: EMERGENCY MEDICINE

## 2023-10-13 PROCEDURE — 85045 AUTOMATED RETICULOCYTE COUNT: CPT | Performed by: EMERGENCY MEDICINE

## 2023-10-13 PROCEDURE — 80053 COMPREHEN METABOLIC PANEL: CPT | Performed by: EMERGENCY MEDICINE

## 2023-10-13 PROCEDURE — 96375 TX/PRO/DX INJ NEW DRUG ADDON: CPT | Performed by: EMERGENCY MEDICINE

## 2023-10-13 PROCEDURE — 258N000003 HC RX IP 258 OP 636: Performed by: EMERGENCY MEDICINE

## 2023-10-13 PROCEDURE — 99284 EMERGENCY DEPT VISIT MOD MDM: CPT | Performed by: EMERGENCY MEDICINE

## 2023-10-13 PROCEDURE — 99284 EMERGENCY DEPT VISIT MOD MDM: CPT | Mod: 25 | Performed by: EMERGENCY MEDICINE

## 2023-10-13 PROCEDURE — 85025 COMPLETE CBC W/AUTO DIFF WBC: CPT | Performed by: EMERGENCY MEDICINE

## 2023-10-13 PROCEDURE — 36415 COLL VENOUS BLD VENIPUNCTURE: CPT | Performed by: EMERGENCY MEDICINE

## 2023-10-13 RX ORDER — SODIUM CHLORIDE, SODIUM LACTATE, POTASSIUM CHLORIDE, CALCIUM CHLORIDE 600; 310; 30; 20 MG/100ML; MG/100ML; MG/100ML; MG/100ML
INJECTION, SOLUTION INTRAVENOUS CONTINUOUS
Status: DISCONTINUED | OUTPATIENT
Start: 2023-10-13 | End: 2023-10-14 | Stop reason: HOSPADM

## 2023-10-13 RX ORDER — KETOROLAC TROMETHAMINE 30 MG/ML
30 INJECTION, SOLUTION INTRAMUSCULAR; INTRAVENOUS ONCE
Status: COMPLETED | OUTPATIENT
Start: 2023-10-13 | End: 2023-10-13

## 2023-10-13 RX ORDER — ONDANSETRON 2 MG/ML
4 INJECTION INTRAMUSCULAR; INTRAVENOUS ONCE
Status: COMPLETED | OUTPATIENT
Start: 2023-10-13 | End: 2023-10-13

## 2023-10-13 RX ORDER — METHOCARBAMOL 750 MG/1
750 TABLET, FILM COATED ORAL 4 TIMES DAILY PRN
Qty: 60 TABLET | Refills: 1 | Status: SHIPPED | OUTPATIENT
Start: 2023-10-13

## 2023-10-13 RX ORDER — OXYCODONE HYDROCHLORIDE 15 MG/1
15 TABLET ORAL EVERY 4 HOURS PRN
Qty: 25 TABLET | Refills: 0 | Status: SHIPPED | OUTPATIENT
Start: 2023-10-13 | End: 2023-10-20

## 2023-10-13 RX ADMIN — SODIUM CHLORIDE, POTASSIUM CHLORIDE, SODIUM LACTATE AND CALCIUM CHLORIDE: 600; 310; 30; 20 INJECTION, SOLUTION INTRAVENOUS at 19:59

## 2023-10-13 RX ADMIN — HYDROMORPHONE HYDROCHLORIDE 1 MG: 1 INJECTION, SOLUTION INTRAMUSCULAR; INTRAVENOUS; SUBCUTANEOUS at 22:41

## 2023-10-13 RX ADMIN — KETOROLAC TROMETHAMINE 30 MG: 30 INJECTION, SOLUTION INTRAMUSCULAR; INTRAVENOUS at 20:00

## 2023-10-13 RX ADMIN — HYDROMORPHONE HYDROCHLORIDE 1 MG: 1 INJECTION, SOLUTION INTRAMUSCULAR; INTRAVENOUS; SUBCUTANEOUS at 21:15

## 2023-10-13 RX ADMIN — ONDANSETRON 4 MG: 2 INJECTION INTRAMUSCULAR; INTRAVENOUS at 21:38

## 2023-10-13 RX ADMIN — HYDROMORPHONE HYDROCHLORIDE 1 MG: 1 INJECTION, SOLUTION INTRAMUSCULAR; INTRAVENOUS; SUBCUTANEOUS at 19:59

## 2023-10-13 ASSESSMENT — ACTIVITIES OF DAILY LIVING (ADL)
ADLS_ACUITY_SCORE: 35
ADLS_ACUITY_SCORE: 33
ADLS_ACUITY_SCORE: 33

## 2023-10-13 NOTE — ED PROVIDER NOTES
[facility] [place] EMERGENCY DEPARTMENT ([facility])    10/13/23      ED PROVIDER NOTE      History     Chief Complaint   Patient presents with    Sickle Cell Pain Crisis     Sickle cell crisis since last night.9/10. port     HPI  Jennifer Cervantes is a 23 year old female with a past medical history significant for pain crises 2/2 sickle cell disease, acute chest syndrome, right-sided hemiplegia 2/2 cerebral infarction (2015), chronic recurrent PE, moderate asthma and cognitive developmental delay who presents to the ED for management of sickle cell pain crisis.  States that her pain has been ongoing since last evening.  She reports taking prednisone yesterday for her asthma, which she believes to have triggered her sickle cell pain.  She then attempted to treat her pain with a warm shower, heating pad, and medication, but with no relief.  Patient states that the pain initially started in her back, but is now generalized throughout her entire body. Pain is currently at a 9/10.  She denies recent fever, cough, or additional URI symptoms.  Patient does have a port in place.    Past Medical History  Past Medical History:   Diagnosis Date    Anxiety     Bleeding disorder (H24)     Blood clotting disorder (H24)     Cerebral infarction (H) 2015    Cognitive developmental delay     low IQ. 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 side (H)     right hand contractures    Iron overload due to repeated red blood cell transfusions     Migraines     Multiple subsegmental pulmonary emboli without acute cor pulmonale (H) 02/01/2021    Oppositional defiant behavior     Presence of intrauterine contraceptive device 2/18/2020    Superficial venous thrombosis of arm, right 03/25/2021    Uncomplicated asthma      Past Surgical History:   Procedure Laterality Date    AS INSERT TUNNELED CV 2 CATH W/O PORT/PUMP       CHOLECYSTECTOMY      CV RIGHT HEART CATH MEASUREMENTS RECORDED N/A 11/18/2021    Procedure: Right Heart Cath;  Surgeon: Jackson Stauffer MD;  Location:  HEART CARDIAC CATH LAB    INSERT PORT VASCULAR ACCESS Left 4/21/2021    Procedure: INSERTION, VASCULAR ACCESS PORT (NOT SURE ON SIDE UNTIL REMOVAL);  Surgeon: Rajan More MD;  Location: [facility] OR    IR CHEST PORT PLACEMENT > 5 YRS OF AGE  4/21/2021    IR CVC NON TUNNEL LINE REMOVAL  5/6/2021    IR CVC NON TUNNEL PLACEMENT > 5 YRS  04/07/2020    IR CVC NON TUNNEL PLACEMENT > 5 YRS  4/30/2021    IR CVC NON TUNNEL PLACEMENT > 5 YRS  9/7/2022    IR PORT REMOVAL LEFT  4/21/2021    REMOVE PORT VASCULAR ACCESS Left 4/21/2021    Procedure: REMOVAL, VASCULAR ACCESS PORT LEFT;  Surgeon: Rajan More MD;  Location: [facility] OR    REPAIR TENDON ELBOW Right 10/02/2019    Procedure: Right Elbow Flexor Lengthening, Flexor Pronator Slide Of Wrist and Finger, Thumb Adductor Lengthening;  Surgeon: Anai Franco MD;  Location: [facility] OR    TONSILLECTOMY Bilateral 10/02/2019    Procedure: Bilateral Tonsillectomy;  Surgeon: Farhana Guy MD;  Location: [facility] OR    ZZC BREAST REDUCTION (INCLUDES LIPO) TIER 3 Bilateral 04/23/2019     acetaminophen (TYLENOL) 325 MG tablet  albuterol (PROAIR HFA/PROVENTIL HFA/VENTOLIN HFA) 108 (90 Base) MCG/ACT inhaler  albuterol (PROVENTIL) (2.5 MG/3ML) 0.083% neb solution  aspirin (ASA) 81 MG chewable tablet  budesonide-formoterol (SYMBICORT) 160-4.5 MCG/ACT Inhaler  cetirizine (ZYRTEC) 10 MG tablet  deferasirox (JADENU) 360 MG tablet  diphenhydrAMINE (BENADRYL) 25 MG capsule  EPINEPHrine (ANY BX GENERIC EQUIV) 0.3 MG/0.3ML injection 2-pack  Hydroxyurea 1000 MG TABS  methocarbamol (ROBAXIN) 750 MG tablet  naloxone (NARCAN) 4 MG/0.1ML nasal spray  ondansetron (ZOFRAN) 8 MG tablet  oxyCODONE IR (ROXICODONE) 15 MG tablet  predniSONE (DELTASONE) 20 MG tablet      Allergies   Allergen Reactions    Contrast Dye      Hives and breathing issues  "   Fish-Derived Products Hives    Seafood Hives    Gadolinium     Iodinated Contrast Media      Family History  Family History   Problem Relation Age of Onset    Sickle Cell Trait Mother     Hypertension Mother     Asthma Mother     Sickle Cell Trait Father     Glaucoma No family hx of     Macular Degeneration No family hx of     Diabetes No family hx of     Gout No family hx of      Social History   Social History     Tobacco Use    Smoking status: Never     Passive exposure: Never    Smokeless tobacco: Never   Substance Use Topics    Alcohol use: Not Currently     Alcohol/week: 0.0 standard drinks of alcohol    Drug use: Never      Past medical history, past surgical history, medications, allergies, family history, and social history were reviewed with the patient. No additional pertinent items.          Physical Exam   BP: 117/78  Pulse: 91  Resp: 16  Height: 162.6 cm (5' 4\")  Weight: 70.3 kg (155 lb)  SpO2: 91 %  Physical Exam  Vitals and nursing note reviewed.   Constitutional:       General: She is not in acute distress.     Appearance: She is well-developed. She is not ill-appearing, toxic-appearing or diaphoretic.   HENT:      Head: Normocephalic and atraumatic.      Mouth/Throat:      Lips: Pink.      Mouth: Mucous membranes are moist.      Pharynx: Oropharynx is clear. No oropharyngeal exudate.   Eyes:      General: Lids are normal. Scleral icterus present.      Extraocular Movements: Extraocular movements intact.      Right eye: No nystagmus.      Left eye: No nystagmus.      Conjunctiva/sclera: Conjunctivae normal.      Pupils: Pupils are equal, round, and reactive to light.   Neck:      Thyroid: No thyromegaly.      Vascular: No JVD.      Trachea: No tracheal deviation.   Cardiovascular:      Rate and Rhythm: Normal rate and regular rhythm.      Pulses: Normal pulses.      Heart sounds: Normal heart sounds. No murmur heard.     No friction rub. No gallop.      Comments: Subcutaneous port site without " erythema, swelling, discharge or bleeding  Pulmonary:      Effort: Pulmonary effort is normal. No respiratory distress.      Breath sounds: Normal breath sounds.   Abdominal:      General: Bowel sounds are normal. There is no distension.      Palpations: Abdomen is soft. There is no mass.      Tenderness: There is no abdominal tenderness. There is no guarding or rebound.   Musculoskeletal:         General: No tenderness. Normal range of motion.      Cervical back: Normal range of motion and neck supple. No erythema or rigidity.      Right lower leg: No edema.      Left lower leg: No edema.   Lymphadenopathy:      Cervical: No cervical adenopathy.   Skin:     General: Skin is warm and dry.      Capillary Refill: Capillary refill takes less than 2 seconds.      Coloration: Skin is not pale.      Findings: No erythema or rash.   Neurological:      Mental Status: She is alert and oriented to person, place, and time.      Cranial Nerves: No cranial nerve deficit.      Sensory: No sensory deficit.      Motor: Weakness present.      Comments: Right-sided hemiplegia noted   Psychiatric:         Mood and Affect: Mood and affect normal.         Speech: Speech normal.         Behavior: Behavior normal.           ED Course, Procedures, & Data    6:57 PM  The patient was seen and examined by Isael Cruz   in Room ED20.     Procedures                      Results for orders placed or performed during the hospital encounter of 10/13/23   Comprehensive metabolic panel     Status: Abnormal   Result Value Ref Range    Sodium 137 135 - 145 mmol/L    Potassium 3.8 3.4 - 5.3 mmol/L    Carbon Dioxide (CO2) 23 22 - 29 mmol/L    Anion Gap 11 7 - 15 mmol/L    Urea Nitrogen 9.2 6.0 - 20.0 mg/dL    Creatinine 0.56 0.51 - 0.95 mg/dL    GFR Estimate >90 >60 mL/min/1.73m2    Calcium 9.1 8.6 - 10.0 mg/dL    Chloride 103 98 - 107 mmol/L    Glucose 82 70 - 99 mg/dL    Alkaline Phosphatase 78 35 - 104 U/L    AST 62 (H) 0 - 45 U/L    ALT  37 0 - 50 U/L    Protein Total 7.5 6.4 - 8.3 g/dL    Albumin 4.6 3.5 - 5.2 g/dL    Bilirubin Total 4.7 (H) <=1.2 mg/dL   Reticulocyte count     Status: Abnormal   Result Value Ref Range    % Reticulocyte 23.6 (H) 0.5 - 2.0 %    Absolute Reticulocyte 0.622 (H) 0.025 - 0.095 10e6/uL   CBC with platelets and differential     Status: Abnormal   Result Value Ref Range    WBC Count 11.7 (H) 4.0 - 11.0 10e3/uL    RBC Count 2.65 (L) 3.80 - 5.20 10e6/uL    Hemoglobin 8.0 (L) 11.7 - 15.7 g/dL    Hematocrit 22.6 (L) 35.0 - 47.0 %    MCV 85 78 - 100 fL    MCH 30.2 26.5 - 33.0 pg    MCHC 35.4 31.5 - 36.5 g/dL    RDW 26.3 (H) 10.0 - 15.0 %    Platelet Count 394 150 - 450 10e3/uL    % Neutrophils 62 %    % Lymphocytes 23 %    % Monocytes 8 %    Mids % (Monos, Eos, Basos)      % Eosinophils 5 %    % Basophils 2 %    % Immature Granulocytes 0 %    NRBCs per 100 WBC 3 (H) <1 /100    Absolute Neutrophils 7.3 1.6 - 8.3 10e3/uL    Absolute Lymphocytes 2.7 0.8 - 5.3 10e3/uL    Absolute Monocytes 1.0 0.0 - 1.3 10e3/uL    Mids Abs (Monos, Eos, Basos)      Absolute Eosinophils 0.5 0.0 - 0.7 10e3/uL    Absolute Basophils 0.2 0.0 - 0.2 10e3/uL    Absolute Immature Granulocytes 0.1 <=0.4 10e3/uL    Absolute NRBCs 0.4 10e3/uL   CBC with platelets differential     Status: Abnormal    Narrative    The following orders were created for panel order CBC with platelets differential.  Procedure                               Abnormality         Status                     ---------                               -----------         ------                     CBC with platelets and d...[[id number]]  Abnormal            Final result                 Please view results for these tests on the individual orders.     Medications   lactated ringers infusion ( Intravenous Rate/Dose Verify 10/13/23 2019)   HYDROmorphone (DILAUDID) injection 1 mg (1 mg Intravenous $Given 10/13/23 2241)   ketorolac (TORADOL) injection 30 mg (30 mg Intravenous $Given 10/13/23 2000)    ondansetron (ZOFRAN) injection 4 mg (4 mg Intravenous $Given 10/13/23 7078)     Labs Ordered and Resulted from Time of ED Arrival to Time of ED Departure   COMPREHENSIVE METABOLIC PANEL - Abnormal       Result Value    Sodium 137      Potassium 3.8      Carbon Dioxide (CO2) 23      Anion Gap 11      Urea Nitrogen 9.2      Creatinine 0.56      GFR Estimate >90      Calcium 9.1      Chloride 103      Glucose 82      Alkaline Phosphatase 78      AST 62 (*)     ALT 37      Protein Total 7.5      Albumin 4.6      Bilirubin Total 4.7 (*)    RETICULOCYTE COUNT - Abnormal    % Reticulocyte 23.6 (*)     Absolute Reticulocyte 0.622 (*)    CBC WITH PLATELETS AND DIFFERENTIAL - Abnormal    WBC Count 11.7 (*)     RBC Count 2.65 (*)     Hemoglobin 8.0 (*)     Hematocrit 22.6 (*)     MCV 85      MCH 30.2      MCHC 35.4      RDW 26.3 (*)     Platelet Count 394      % Neutrophils 62      % Lymphocytes 23      % Monocytes 8      Mids % (Monos, Eos, Basos)        % Eosinophils 5      % Basophils 2      % Immature Granulocytes 0      NRBCs per 100 WBC 3 (*)     Absolute Neutrophils 7.3      Absolute Lymphocytes 2.7      Absolute Monocytes 1.0      Mids Abs (Monos, Eos, Basos)        Absolute Eosinophils 0.5      Absolute Basophils 0.2      Absolute Immature Granulocytes 0.1      Absolute NRBCs 0.4       No orders to display              Assessment & Plan       This patient presented to the emergency department with symptoms consistent with an acute sickle cell pain crisis. Her pain is similar to the past crisises she has had. There is no evidence for osteomyelitis, acute infectious process, acute chest, stroke, or aplastic crisis.  Her labs do show evidence to suggest an acute pain crisis with increased reticulocyte count, mild leukocytosis, increase in serum bilirubin levels.  She was given several doses of dilaudid while in the emergency department and pain improved to a point where the patient felt comfortable going home and  managing her crisis with oral medication.  She was instructed to follow-up with her hematology clinic and was discharged with a ride in good condition.    I have reviewed the nursing notes. I have reviewed the findings, diagnosis, plan and need for follow up with the patient.    New Prescriptions    No medications on file       Final diagnoses:   Sickle cell pain crisis (H)   I, Willow Martinez, am serving as a trained medical scribe to document services personally performed by Isael Cruz MD, based on the provider's statements to me.      I, Isael Cruz MD, was physically present and have reviewed and verified the accuracy of this note documented by Willow Martinez.       Isael Cruz MD  [facility] EMERGENCY DEPARTMENT  10/13/2023     Isael Cruz MD  10/13/23 9090

## 2023-10-13 NOTE — PROGRESS NOTES
Early refill sent due to major pain crisis and added Robaxin. Jennifer left [place] ED. She is going [place]  [place] [facility] [place] then will go to [facility]

## 2023-10-13 NOTE — PROGRESS NOTES
[facility]: Cancer Care Initial Note                                    Discussion with Patient:                                                      Pt called into triage.  Pt having pain 10/10 and tried going to the ED put there was no place to sit.  Pt has been doing all of her normal interventions such as tylenol, ibuprofen, heat, hot baths, oxy, but she is having trouble getting ahead of the pain.  She had to call off of work today and is afraid she will have to tomorrow too.            Goals          General     Pain Management (pt-stated)      Notes - Note created  10/13/2023  3:51 PM by Arely Krueger RN     Goal Statement: I will establish a plan for preventing and managing pain.  Date Goal set: 10/13/2023  Barriers: disease burden, multiple diagnoses, and transportation  Strengths: motivation, health awareness, and involvement with care team  Date to Achieve By: ongoing  Patient expressed understanding of goal: Yes  Action steps to achieve this goal:  I will take medications as prescribed.   I will call triage with new or worsening pain not controlled by medication.   I will use alternative therapies as directed. (Ice, heat, massage, etc)   I will establish care will palliative care department for ongoing pain management as needed.   I will call triage for medication refills when there are 2-3 days of medication remaining.                 Assessment:                                                      Initial  Patient Reported Pain?: Yes, but is new or different pain  Pain Score: Worst Pain (10)  Pain Loc: Low Back  (DVPRS) Pain Rating Score : 10-As bad as it could be, nothing else matters  Interfered with your usual ACTIVITY?: 9  Interfered with your SLEEP?: 9  Affected your MOOD?: 9  Contributed to your STRESS?: 9  Pain Management Interventions: medication (see MAR)    RNCC Short Symptom Review:     Assessment completed with:: Patient    General/Short Assessment  Does the patient have all their  medications?: Yes  Is the patient experiencing any new or worsening symptoms?: Yes, See comments (Pain 10/10 in back and generalized)  Discussion with patient: Answered patient's questions and addressed concerns    Pain  Patient Reported Pain?: Yes, but is new or different pain  Pain Score: Worst Pain (10)  Pain Loc: Low Back  (DVPRS) Pain Rating Score : 10-As bad as it could be, nothing else matters  Interfered with your usual ACTIVITY?: 9  Interfered with your SLEEP?: 9  Affected your MOOD?: 9  Contributed to your STRESS?: 9  Pain Management Interventions: medication (see MAR)    Patient Coping  Sadness;Fearful (pt fearful of ED and the long wait.  Feels like she won't be seen in a timely manner and will just suffer in the waiting room.)    Clinic Utilization  Patient Aware of Next Appointment?: Yes    Other Patient Concerns       Intervention/Education provided during outreach:                                                       Spoke with Dr Duncan.  He will send in a refill of pain meds and a muscle relaxer.  Advised pt to try a different ED.  She agrees with plan and will  meds and try [facility].      Patient to call/[employer] message with updates    Signature:  Arely Krueger RN

## 2023-10-13 NOTE — ED TRIAGE NOTES
Sickle cell crisis since last night.9/10. port     Triage Assessment (Adult)       Row Name 10/13/23 2920          Triage Assessment    Airway WDL WDL        Respiratory WDL    Respiratory WDL WDL        Skin Circulation/Temperature WDL    Skin Circulation/Temperature WDL WDL        Cardiac WDL    Cardiac WDL WDL        Peripheral/Neurovascular WDL    Peripheral Neurovascular WDL WDL        Cognitive/Neuro/Behavioral WDL    Cognitive/Neuro/Behavioral WDL WDL

## 2023-10-13 NOTE — TELEPHONE ENCOUNTER
Pt calling in to request to be added to the wait list for IVF/pain medication. Pt states she is currently in the ED but it is very full and she wonders if we can get her in. Informed pt that unfortunately it does not look like there are any openings currently at the infusion center and we do have other pts on the wait list. Advised pt to remain in ED for IVF/Pain meds. Pt verbalized understanding.

## 2023-10-13 NOTE — TELEPHONE ENCOUNTER
Oncology Nurse Triage - Pain  Situation: Jennifer reporting the following symptoms: Pain    Background:   Treating Provider: Eric Duncan and Patricia Mantilla CNP. At time of call, Patricia is out of the office.   Date of last office visit: 10/02/23    Assessment:     Location: pain in back and all over; getting worse  Onset today  Duration/Frequency:constant  Rates: 10-20/10  Quality: sharp   Current med(s) used:taking oxycodone Q 4H, tylenol and ibuprofen. Just took meds one hour ago.   Worsens or relieves with: Nothing is relieving it. She has tried warm baths and heat and a blanket with heat but nothing is helping.  Pt is calling because she was at the ED and left because it was packed and there were no beds available. She states she had to call out of work and doesn't want to do that tomorrow. She and her mom have called twice today and ED is still packed with no chairs available for waiting. Pt states the home pain meds are just not helping right now. She is tearful on the phone. She states the weather is not helping the situation.     Recommendations:   Reached out to Arely Krueger, RNCC and Dr. Duncan as Patricia is out of the office.   1527: Per Dr. Duncan, Arely is connecting with Jennifer.  No further action needed from Triage at this time.  1533:Per Arely Jennifer is going to  some more meds and go to [facility] to see if how busy they are.

## 2023-10-14 NOTE — DISCHARGE INSTRUCTIONS
Follow-up with your primary clinic as well as your hematologist.    Continue current home management.    Return to the emergency department for any problems.

## 2023-10-17 ENCOUNTER — PATIENT OUTREACH (OUTPATIENT)
Dept: CARE COORDINATION | Facility: CLINIC | Age: 23
End: 2023-10-17
Payer: COMMERCIAL

## 2023-10-17 ENCOUNTER — NURSE TRIAGE (OUTPATIENT)
Dept: ONCOLOGY | Facility: CLINIC | Age: 23
End: 2023-10-17
Payer: COMMERCIAL

## 2023-10-17 ENCOUNTER — INFUSION THERAPY VISIT (OUTPATIENT)
Dept: TRANSPLANT | Facility: CLINIC | Age: 23
End: 2023-10-17
Attending: PEDIATRICS
Payer: COMMERCIAL

## 2023-10-17 VITALS
RESPIRATION RATE: 18 BRPM | OXYGEN SATURATION: 93 % | DIASTOLIC BLOOD PRESSURE: 74 MMHG | HEART RATE: 104 BPM | TEMPERATURE: 99 F | SYSTOLIC BLOOD PRESSURE: 138 MMHG

## 2023-10-17 DIAGNOSIS — D57.00 SICKLE CELL PAIN CRISIS (H): Primary | ICD-10-CM

## 2023-10-17 DIAGNOSIS — D57.1 HB-SS DISEASE WITHOUT CRISIS (H): ICD-10-CM

## 2023-10-17 DIAGNOSIS — R49.0 HOARSE VOICE QUALITY: ICD-10-CM

## 2023-10-17 DIAGNOSIS — R07.89 CHEST TIGHTNESS: ICD-10-CM

## 2023-10-17 DIAGNOSIS — R07.89 CHEST TIGHTNESS: Primary | ICD-10-CM

## 2023-10-17 DIAGNOSIS — G81.10 SPASTIC HEMIPLEGIA, UNSPECIFIED ETIOLOGY, UNSPECIFIED LATERALITY (H): ICD-10-CM

## 2023-10-17 LAB
ALBUMIN SERPL BCG-MCNC: 4.3 G/DL (ref 3.5–5.2)
ALP SERPL-CCNC: 65 U/L (ref 35–104)
ALT SERPL W P-5'-P-CCNC: 27 U/L (ref 0–50)
ANION GAP SERPL CALCULATED.3IONS-SCNC: 8 MMOL/L (ref 7–15)
AST SERPL W P-5'-P-CCNC: 40 U/L (ref 0–45)
BASO+EOS+MONOS # BLD AUTO: ABNORMAL 10*3/UL
BASO+EOS+MONOS NFR BLD AUTO: ABNORMAL %
BASOPHILS # BLD AUTO: 0.2 10E3/UL (ref 0–0.2)
BASOPHILS NFR BLD AUTO: 2 %
BILIRUB SERPL-MCNC: 3.4 MG/DL
BUN SERPL-MCNC: 5.9 MG/DL (ref 6–20)
C PNEUM DNA SPEC QL NAA+PROBE: NOT DETECTED
CALCIUM SERPL-MCNC: 8.7 MG/DL (ref 8.6–10)
CHLORIDE SERPL-SCNC: 107 MMOL/L (ref 98–107)
CREAT SERPL-MCNC: 0.46 MG/DL (ref 0.51–0.95)
DEPRECATED HCO3 PLAS-SCNC: 23 MMOL/L (ref 22–29)
EGFRCR SERPLBLD CKD-EPI 2021: >90 ML/MIN/1.73M2
EOSINOPHIL # BLD AUTO: 0.5 10E3/UL (ref 0–0.7)
EOSINOPHIL NFR BLD AUTO: 5 %
ERYTHROCYTE [DISTWIDTH] IN BLOOD BY AUTOMATED COUNT: 23.5 % (ref 10–15)
FLUAV H1 2009 PAND RNA SPEC QL NAA+PROBE: NOT DETECTED
FLUAV H1 RNA SPEC QL NAA+PROBE: NOT DETECTED
FLUAV H3 RNA SPEC QL NAA+PROBE: NOT DETECTED
FLUAV RNA SPEC QL NAA+PROBE: NOT DETECTED
FLUBV RNA SPEC QL NAA+PROBE: NOT DETECTED
GLUCOSE SERPL-MCNC: 86 MG/DL (ref 70–99)
GROUP A STREP BY PCR: NOT DETECTED
HADV DNA SPEC QL NAA+PROBE: NOT DETECTED
HCOV PNL SPEC NAA+PROBE: NOT DETECTED
HCT VFR BLD AUTO: 19.9 % (ref 35–47)
HGB BLD-MCNC: 7.2 G/DL (ref 11.7–15.7)
HMPV RNA SPEC QL NAA+PROBE: NOT DETECTED
HPIV1 RNA SPEC QL NAA+PROBE: NOT DETECTED
HPIV2 RNA SPEC QL NAA+PROBE: NOT DETECTED
HPIV3 RNA SPEC QL NAA+PROBE: NOT DETECTED
HPIV4 RNA SPEC QL NAA+PROBE: NOT DETECTED
IMM GRANULOCYTES # BLD: 0.1 10E3/UL
IMM GRANULOCYTES NFR BLD: 1 %
LYMPHOCYTES # BLD AUTO: 1.8 10E3/UL (ref 0.8–5.3)
LYMPHOCYTES NFR BLD AUTO: 17 %
M PNEUMO DNA SPEC QL NAA+PROBE: NOT DETECTED
MCH RBC QN AUTO: 30.8 PG (ref 26.5–33)
MCHC RBC AUTO-ENTMCNC: 36.2 G/DL (ref 31.5–36.5)
MCV RBC AUTO: 85 FL (ref 78–100)
MONOCYTES # BLD AUTO: 0.9 10E3/UL (ref 0–1.3)
MONOCYTES NFR BLD AUTO: 8 %
NEUTROPHILS # BLD AUTO: 7.6 10E3/UL (ref 1.6–8.3)
NEUTROPHILS NFR BLD AUTO: 67 %
NRBC # BLD AUTO: 0.1 10E3/UL
NRBC BLD AUTO-RTO: 1 /100
PLATELET # BLD AUTO: 404 10E3/UL (ref 150–450)
POTASSIUM SERPL-SCNC: 3.9 MMOL/L (ref 3.4–5.3)
PROT SERPL-MCNC: 7.1 G/DL (ref 6.4–8.3)
RBC # BLD AUTO: 2.34 10E6/UL (ref 3.8–5.2)
RETICS # AUTO: 0.46 10E6/UL (ref 0.03–0.1)
RETICS/RBC NFR AUTO: 19.2 % (ref 0.5–2)
RSV RNA SPEC QL NAA+PROBE: NOT DETECTED
RSV RNA SPEC QL NAA+PROBE: NOT DETECTED
RV+EV RNA SPEC QL NAA+PROBE: NOT DETECTED
SODIUM SERPL-SCNC: 138 MMOL/L (ref 135–145)
WBC # BLD AUTO: 11 10E3/UL (ref 4–11)

## 2023-10-17 PROCEDURE — 87581 M.PNEUMON DNA AMP PROBE: CPT | Performed by: REGISTERED NURSE

## 2023-10-17 PROCEDURE — 250N000013 HC RX MED GY IP 250 OP 250 PS 637: Performed by: PEDIATRICS

## 2023-10-17 PROCEDURE — 85045 AUTOMATED RETICULOCYTE COUNT: CPT

## 2023-10-17 PROCEDURE — 80053 COMPREHEN METABOLIC PANEL: CPT

## 2023-10-17 PROCEDURE — 85004 AUTOMATED DIFF WBC COUNT: CPT

## 2023-10-17 PROCEDURE — 96376 TX/PRO/DX INJ SAME DRUG ADON: CPT

## 2023-10-17 PROCEDURE — 96374 THER/PROPH/DIAG INJ IV PUSH: CPT

## 2023-10-17 PROCEDURE — 258N000003 HC RX IP 258 OP 636: Performed by: PEDIATRICS

## 2023-10-17 PROCEDURE — 87651 STREP A DNA AMP PROBE: CPT | Performed by: REGISTERED NURSE

## 2023-10-17 PROCEDURE — 96361 HYDRATE IV INFUSION ADD-ON: CPT

## 2023-10-17 PROCEDURE — 36591 DRAW BLOOD OFF VENOUS DEVICE: CPT

## 2023-10-17 PROCEDURE — 87635 SARS-COV-2 COVID-19 AMP PRB: CPT

## 2023-10-17 PROCEDURE — 250N000011 HC RX IP 250 OP 636: Performed by: PEDIATRICS

## 2023-10-17 RX ORDER — KETOROLAC TROMETHAMINE 30 MG/ML
30 INJECTION, SOLUTION INTRAMUSCULAR; INTRAVENOUS ONCE
Status: CANCELLED
Start: 2024-01-01 | End: 2024-01-01

## 2023-10-17 RX ORDER — HEPARIN SODIUM (PORCINE) LOCK FLUSH IV SOLN 100 UNIT/ML 100 UNIT/ML
5 SOLUTION INTRAVENOUS
Status: CANCELLED | OUTPATIENT
Start: 2024-01-01

## 2023-10-17 RX ORDER — DIPHENHYDRAMINE HCL 25 MG
25 CAPSULE ORAL
Status: COMPLETED | OUTPATIENT
Start: 2023-10-17 | End: 2023-10-17

## 2023-10-17 RX ORDER — HEPARIN SODIUM,PORCINE 10 UNIT/ML
5 VIAL (ML) INTRAVENOUS
Status: CANCELLED | OUTPATIENT
Start: 2024-01-01

## 2023-10-17 RX ORDER — ONDANSETRON 8 MG/1
8 TABLET, ORALLY DISINTEGRATING ORAL
Status: COMPLETED | OUTPATIENT
Start: 2023-10-17 | End: 2023-10-17

## 2023-10-17 RX ORDER — DIPHENHYDRAMINE HCL 25 MG
25 CAPSULE ORAL
Status: CANCELLED
Start: 2024-01-01

## 2023-10-17 RX ORDER — ONDANSETRON 4 MG/1
8 TABLET, FILM COATED ORAL
Status: CANCELLED
Start: 2024-01-01

## 2023-10-17 RX ORDER — HEPARIN SODIUM (PORCINE) LOCK FLUSH IV SOLN 100 UNIT/ML 100 UNIT/ML
5 SOLUTION INTRAVENOUS
Status: DISCONTINUED | OUTPATIENT
Start: 2023-10-17 | End: 2023-10-17 | Stop reason: HOSPADM

## 2023-10-17 RX ADMIN — HYDROMORPHONE HYDROCHLORIDE 1 MG: 1 INJECTION, SOLUTION INTRAMUSCULAR; INTRAVENOUS; SUBCUTANEOUS at 13:39

## 2023-10-17 RX ADMIN — HYDROMORPHONE HYDROCHLORIDE 1 MG: 1 INJECTION, SOLUTION INTRAMUSCULAR; INTRAVENOUS; SUBCUTANEOUS at 12:23

## 2023-10-17 RX ADMIN — HYDROMORPHONE HYDROCHLORIDE 1 MG: 1 INJECTION, SOLUTION INTRAMUSCULAR; INTRAVENOUS; SUBCUTANEOUS at 14:41

## 2023-10-17 RX ADMIN — SODIUM CHLORIDE, POTASSIUM CHLORIDE, SODIUM LACTATE AND CALCIUM CHLORIDE 1000 ML: 600; 310; 30; 20 INJECTION, SOLUTION INTRAVENOUS at 12:30

## 2023-10-17 RX ADMIN — DIPHENHYDRAMINE HYDROCHLORIDE 25 MG: 25 CAPSULE ORAL at 12:23

## 2023-10-17 RX ADMIN — ONDANSETRON 8 MG: 8 TABLET, ORALLY DISINTEGRATING ORAL at 12:23

## 2023-10-17 RX ADMIN — Medication 5 ML: at 15:19

## 2023-10-17 ASSESSMENT — PAIN SCALES - GENERAL: PAINLEVEL: WORST PAIN (10)

## 2023-10-17 NOTE — PROGRESS NOTES
Community Health Worker Note: Telephone Call  Oncology Clinic     Data/Intervention:  Patient Name:  Jennifer Cervantes DOB/Age: [birth year]     Call From: Triage Nurse        Reason for Call:  Transportation      Assessment:     CHW also called [employer]  ([phone] )  to arrange ride through patient's insurance.  [employer] arranged a round trip ride  Patient will need to call when ready for return ride home [phone]. Talked to patient and she will call [employer] to setup  time.     Plan:  Patient is aware of the transportation plan. /CHW available to assist with any other needs.      Isaura OTTO Community Health Worker  Clinic Care Coordination  [facility]  Phone: [phone]

## 2023-10-17 NOTE — PROGRESS NOTES
"Infusion Nursing Note:  Jennifer Cervantes presents today for add-on IVF/pain meds.    Patient seen by provider today: No   present during visit today: Not Applicable.    Note: VSS. Meds and allergies reviewed. Pt reports feeling \"like something is coming on\". She has a newly hoarse voice (no sore throat), 4/10 chest pressure, and wheezing for which her inhaler has been minimally helpful (works for only about an hour) which all started on 10/14. No fevers or other symptoms. Patricia Mantilla CNP notified. CMP, CBC, reticulocyte count, Strep throat swab, RVP swab, and Covid swab ordered. Pt instructed that if chest pressure worsens or is not managed with home pain medication or wheezing progresses or has SOB she needs to go the ER to be evaluated. Pt verbalized understanding.    Pt received 1L LR, 25 mg Benadryl PO, 8 mg Zofran ODT, and 1 mg Dilaudid IVP x3 doses (3 mg total) for back pain. Pt reported pain to be tolerable at time of discharge.       Intravenous Access:  Implanted Port.    Treatment Conditions:  Not Applicable.      Post Infusion Assessment:  Patient tolerated infusion without incident.  Blood return noted pre and post infusion.  No evidence of extravasations.  Access discontinued per protocol.       Discharge Plan:   Patient discharged in stable condition accompanied by: self.  Departure Mode: Ambulatory.      Jaida Kelly RN    "

## 2023-10-17 NOTE — TELEPHONE ENCOUNTER
Oncology Nurse Triage - Sickle Cell Pain Crisis:  Situation: Jennifer  calling about Sickle Cell Pain Crisis, requesting to be added on for IV fluids and pain medicine    Background:   Patient's last infusion was 10/13/23  Last clinic visit date:10/2/23  Does patient have active treatment plan?  Yes    Assessment of Symptoms:  Onset/Duration of symptoms: 1 day    Is it typical sickle cell pain? Yes   Location: back  Character: Sharp           Intensity: 8/10    Any radiation of pain, numbness, tingling, weakness, warmth, swelling, discoloration of arms or legs?No     Fever?No    Chest Pain Present: No     Shortness of breath: No   Pt reports losing her voice and does sound raspy today.     Other home therapies tried: HEAT/HEATING PAD     Last home medication taken and when: Oxycodone and Robaxin 0600    Any Refills Needed?: No     Does patient have transportation & length of time to get to clinic: No - needs transportation    Recommendations:   Added to infusion wait list.   0707 call to pt to offer 1230 appt in BMT infusion. Pt accepting. Updated jonny Sena RN.   7015 message sent to scheduling and SW to help assist w/ transportation.     If you do not hear from the infusion center by 2pm then you will not be able to get in for an infusion today. If symptoms worsen while waiting for call back, and/or you experience fever, chills, SOB, chest pain, cough, n/v, dizziness, numbness, swelling, discoloration of extremities, then seek emergency evaluation in Emergency Department.     Please note, if you are late for your appt, you risk losing your infusion appt as it may delay another patient's infusion who arrived on time.

## 2023-10-18 LAB — SARS-COV-2 RNA RESP QL NAA+PROBE: NEGATIVE

## 2023-10-20 ENCOUNTER — PATIENT OUTREACH (OUTPATIENT)
Dept: CARE COORDINATION | Facility: CLINIC | Age: 23
End: 2023-10-20
Payer: COMMERCIAL

## 2023-10-20 ENCOUNTER — NURSE TRIAGE (OUTPATIENT)
Dept: ONCOLOGY | Facility: CLINIC | Age: 23
End: 2023-10-20
Payer: COMMERCIAL

## 2023-10-20 ENCOUNTER — TELEPHONE (OUTPATIENT)
Dept: OPHTHALMOLOGY | Facility: CLINIC | Age: 23
End: 2023-10-20

## 2023-10-20 ENCOUNTER — INFUSION THERAPY VISIT (OUTPATIENT)
Dept: INFUSION THERAPY | Facility: CLINIC | Age: 23
End: 2023-10-20
Attending: PEDIATRICS
Payer: COMMERCIAL

## 2023-10-20 VITALS
HEART RATE: 84 BPM | RESPIRATION RATE: 16 BRPM | SYSTOLIC BLOOD PRESSURE: 104 MMHG | DIASTOLIC BLOOD PRESSURE: 62 MMHG | OXYGEN SATURATION: 90 % | TEMPERATURE: 98 F

## 2023-10-20 DIAGNOSIS — G81.10 SPASTIC HEMIPLEGIA, UNSPECIFIED ETIOLOGY, UNSPECIFIED LATERALITY (H): ICD-10-CM

## 2023-10-20 DIAGNOSIS — D57.00 SICKLE CELL PAIN CRISIS (H): Primary | ICD-10-CM

## 2023-10-20 DIAGNOSIS — D57.00 SICKLE CELL PAIN CRISIS (H): ICD-10-CM

## 2023-10-20 PROCEDURE — 250N000013 HC RX MED GY IP 250 OP 250 PS 637: Performed by: PEDIATRICS

## 2023-10-20 PROCEDURE — 96375 TX/PRO/DX INJ NEW DRUG ADDON: CPT

## 2023-10-20 PROCEDURE — 96361 HYDRATE IV INFUSION ADD-ON: CPT

## 2023-10-20 PROCEDURE — 258N000003 HC RX IP 258 OP 636: Performed by: PEDIATRICS

## 2023-10-20 PROCEDURE — 96376 TX/PRO/DX INJ SAME DRUG ADON: CPT

## 2023-10-20 PROCEDURE — 250N000011 HC RX IP 250 OP 636: Mod: JZ | Performed by: PEDIATRICS

## 2023-10-20 PROCEDURE — 96374 THER/PROPH/DIAG INJ IV PUSH: CPT

## 2023-10-20 RX ORDER — KETOROLAC TROMETHAMINE 30 MG/ML
30 INJECTION, SOLUTION INTRAMUSCULAR; INTRAVENOUS ONCE
Status: CANCELLED
Start: 2024-01-01 | End: 2024-01-01

## 2023-10-20 RX ORDER — HEPARIN SODIUM (PORCINE) LOCK FLUSH IV SOLN 100 UNIT/ML 100 UNIT/ML
5 SOLUTION INTRAVENOUS
Status: DISCONTINUED | OUTPATIENT
Start: 2023-10-20 | End: 2023-10-20 | Stop reason: HOSPADM

## 2023-10-20 RX ORDER — ONDANSETRON 8 MG/1
8 TABLET, FILM COATED ORAL
Status: COMPLETED | OUTPATIENT
Start: 2023-10-20 | End: 2023-10-20

## 2023-10-20 RX ORDER — KETOROLAC TROMETHAMINE 30 MG/ML
30 INJECTION, SOLUTION INTRAMUSCULAR; INTRAVENOUS ONCE
Status: COMPLETED | OUTPATIENT
Start: 2023-10-20 | End: 2023-10-20

## 2023-10-20 RX ORDER — OXYCODONE HYDROCHLORIDE 15 MG/1
15 TABLET ORAL EVERY 4 HOURS PRN
Qty: 25 TABLET | Refills: 0 | Status: SHIPPED | OUTPATIENT
Start: 2023-10-20 | End: 2023-10-27

## 2023-10-20 RX ORDER — ONDANSETRON 8 MG/1
8 TABLET, FILM COATED ORAL
Status: CANCELLED
Start: 2024-01-01

## 2023-10-20 RX ORDER — DIPHENHYDRAMINE HCL 25 MG
25 CAPSULE ORAL
Status: COMPLETED | OUTPATIENT
Start: 2023-10-20 | End: 2023-10-20

## 2023-10-20 RX ORDER — HEPARIN SODIUM (PORCINE) LOCK FLUSH IV SOLN 100 UNIT/ML 100 UNIT/ML
5 SOLUTION INTRAVENOUS
Status: CANCELLED | OUTPATIENT
Start: 2024-01-01

## 2023-10-20 RX ORDER — DIPHENHYDRAMINE HCL 25 MG
25 CAPSULE ORAL
Status: CANCELLED
Start: 2024-01-01

## 2023-10-20 RX ORDER — HEPARIN SODIUM,PORCINE 10 UNIT/ML
5 VIAL (ML) INTRAVENOUS
Status: CANCELLED | OUTPATIENT
Start: 2024-01-01

## 2023-10-20 RX ADMIN — ONDANSETRON HYDROCHLORIDE 8 MG: 8 TABLET, FILM COATED ORAL at 12:21

## 2023-10-20 RX ADMIN — HYDROMORPHONE HYDROCHLORIDE 1 MG: 1 INJECTION, SOLUTION INTRAMUSCULAR; INTRAVENOUS; SUBCUTANEOUS at 12:19

## 2023-10-20 RX ADMIN — DIPHENHYDRAMINE HYDROCHLORIDE 25 MG: 25 CAPSULE ORAL at 12:22

## 2023-10-20 RX ADMIN — SODIUM CHLORIDE, POTASSIUM CHLORIDE, SODIUM LACTATE AND CALCIUM CHLORIDE 1000 ML: 600; 310; 30; 20 INJECTION, SOLUTION INTRAVENOUS at 12:17

## 2023-10-20 RX ADMIN — HYDROMORPHONE HYDROCHLORIDE 1 MG: 1 INJECTION, SOLUTION INTRAMUSCULAR; INTRAVENOUS; SUBCUTANEOUS at 13:20

## 2023-10-20 RX ADMIN — HEPARIN 5 ML: 100 SYRINGE at 14:48

## 2023-10-20 RX ADMIN — KETOROLAC TROMETHAMINE 30 MG: 30 INJECTION, SOLUTION INTRAMUSCULAR; INTRAVENOUS at 12:25

## 2023-10-20 RX ADMIN — HYDROMORPHONE HYDROCHLORIDE 1 MG: 1 INJECTION, SOLUTION INTRAMUSCULAR; INTRAVENOUS; SUBCUTANEOUS at 14:20

## 2023-10-20 ASSESSMENT — PAIN SCALES - GENERAL: PAINLEVEL: WORST PAIN (10)

## 2023-10-20 NOTE — TELEPHONE ENCOUNTER
Call from pt to check infusion availability. Writer informed a this time, there are no infusion openings, but will call her as soon as something opens. Pt voiced understanding.

## 2023-10-20 NOTE — TELEPHONE ENCOUNTER
Narcotic Refill Request    Medication(s) requested:  oxycodone   Person Requesting Refill: Jennifer   What pain is the medication treating: sickle cell pain   How is the medication being taken?:takes 1 tab every 4 hours since last Friday due to sickle cell pain    Does pt have enough for today? Will be out today   Is pain being adequately controlled on the current regimen?: normally well controlled   Experiencing any side effects from medication?: No     Date of most recent appointment:  10/2/23   Any No Show Visits:No   Next appointment:   11/3/23 Patricia Mantilla   Last fill date and by whom:  10/13/23 DR Duncan    Reviewed: No access     Send to provider:  Dr Duncan

## 2023-10-20 NOTE — TELEPHONE ENCOUNTER
Spoke with patient regarding scheduling for a sooner appointment from the wait-list. Scheduled patient as offered and confirmed appointment details. -Per Patient

## 2023-10-20 NOTE — TELEPHONE ENCOUNTER
Jennifer can be seen at [place] at 12:30 for IVF/PM she states she can make that appointment     Message sent to Social work to help arrange transportation to [place].     Kaykay Son will schedule.

## 2023-10-20 NOTE — PROGRESS NOTES
"Infusion Nursing Note:  Jennifer Cervantes presents today for fluids and meds.    Patient seen by provider today: No   present during visit today: Not Applicable.    Note: Patient states back pain has been worse today.  Rates a \"10\" upon arrival to clinic.    Following fluids and meds patient rates pain at a \"4\" and feels this is tolerable.    Intravenous Access:  Implanted Port.    Treatment Conditions:  Not Applicable.      Post Infusion Assessment:  Patient tolerated infusion without incident.  Blood return noted pre and post infusion.  Site patent and intact, free from redness, edema or discomfort.  No evidence of extravasations.  Access discontinued per protocol.       Discharge Plan:   Patient discharged in stable condition accompanied by: self.  Departure Mode: Ambulatory.  Medical cab called for transportation home.      Zaynab Barron RN    "

## 2023-10-20 NOTE — TELEPHONE ENCOUNTER
Oncology Nurse Triage - Sickle Cell Pain Crisis:    Situation: Jennifer  calling about Sickle Cell Pain Crisis, requesting to be added on for IV fluids and pain medicine    Background:     Patient's last infusion was 10/17/23  Last clinic visit date:10/2/23   Does patient have active treatment plan?  Yes      Assessment of Symptoms:  Onset/Duration of symptoms: 1 day    Is it typical sickle cell pain? Yes   Location: back   Character: Sharp           Intensity: 10/10    Any radiation of pain, numbness, tingling, weakness, warmth, swelling, discoloration of arms or legs?No     Fever?No  (if yes max temperature recorded in last 24 hours):      Chest Pain Present: No     Shortness of breath: No     Other home therapies tried: HEAT/HEATING PAD and WARM BATH     Last home medication taken and when: oxycodone at 6:00am     Any Refills Needed?: Yes     Does patient have transportation & length of time to get to clinic: No needs help with transportation         Recommendations:     Placed on infusion call list     If you do not hear from the infusion center by 2pm then you will not be able to get in for an infusion today. If symptoms worsen while waiting for call back, and/or you experience fever, chills, SOB, chest pain, cough, n/v, dizziness, numbness, swelling, discoloration of extremities, then seek emergency evaluation in Emergency Department.     Please note, if you are late for your appt, you risk losing your infusion appt as it may delay another patient's infusion who arrived on time.                 t

## 2023-10-20 NOTE — PROGRESS NOTES
Social Work - Transportation  [facility]    Data/Intervention:  Patient Name: Jennifer Cervantes Goes By: Jennifer    /Age: 2000 (23 year old)  [facility]  Reason for Referral:  support requested for patient transportation needs for today's appointment.  Assessment:  called [employer] to arrange ride through patient's insurance. [employer] arranged  for patient from home with [employer]. Patient will need to call [phone] when ready for return ride home.  Plan: Patient is aware of the transportation plan.  available to assist with any other needs.    CARLOS Chavez,SW  Hematology/Oncology Social Worker  Phone:[phone] Pager: [phone]

## 2023-10-21 ENCOUNTER — HOSPITAL ENCOUNTER (EMERGENCY)
Facility: CLINIC | Age: 23
Discharge: HOME | End: 2023-10-22
Attending: EMERGENCY MEDICINE | Admitting: EMERGENCY MEDICINE
Payer: COMMERCIAL

## 2023-10-21 DIAGNOSIS — D57.00 SICKLE CELL PAIN CRISIS (H): ICD-10-CM

## 2023-10-21 LAB
ALBUMIN SERPL BCG-MCNC: 4.4 G/DL (ref 3.5–5.2)
ALP SERPL-CCNC: 74 U/L (ref 35–104)
ALT SERPL W P-5'-P-CCNC: 31 U/L (ref 0–50)
ANION GAP SERPL CALCULATED.3IONS-SCNC: 11 MMOL/L (ref 7–15)
AST SERPL W P-5'-P-CCNC: 50 U/L (ref 0–45)
BASO+EOS+MONOS # BLD AUTO: ABNORMAL 10*3/UL
BASO+EOS+MONOS NFR BLD AUTO: ABNORMAL %
BASOPHILS # BLD AUTO: ABNORMAL 10*3/UL
BASOPHILS # BLD MANUAL: 0.4 10E3/UL (ref 0–0.2)
BASOPHILS NFR BLD AUTO: ABNORMAL %
BASOPHILS NFR BLD MANUAL: 3 %
BILIRUB SERPL-MCNC: 3.7 MG/DL
BUN SERPL-MCNC: 16.4 MG/DL (ref 6–20)
CALCIUM SERPL-MCNC: 8.5 MG/DL (ref 8.6–10)
CHLORIDE SERPL-SCNC: 103 MMOL/L (ref 98–107)
CREAT SERPL-MCNC: 0.68 MG/DL (ref 0.51–0.95)
DEPRECATED HCO3 PLAS-SCNC: 23 MMOL/L (ref 22–29)
EGFRCR SERPLBLD CKD-EPI 2021: >90 ML/MIN/1.73M2
EOSINOPHIL # BLD AUTO: ABNORMAL 10*3/UL
EOSINOPHIL # BLD MANUAL: 0.7 10E3/UL (ref 0–0.7)
EOSINOPHIL NFR BLD AUTO: ABNORMAL %
EOSINOPHIL NFR BLD MANUAL: 5 %
ERYTHROCYTE [DISTWIDTH] IN BLOOD BY AUTOMATED COUNT: 27.2 % (ref 10–15)
GLUCOSE SERPL-MCNC: 86 MG/DL (ref 70–99)
HCT VFR BLD AUTO: 20.9 % (ref 35–47)
HGB BLD-MCNC: 7.4 G/DL (ref 11.7–15.7)
IMM GRANULOCYTES # BLD: ABNORMAL 10*3/UL
IMM GRANULOCYTES NFR BLD: ABNORMAL %
LYMPHOCYTES # BLD AUTO: ABNORMAL 10*3/UL
LYMPHOCYTES # BLD MANUAL: 3.3 10E3/UL (ref 0.8–5.3)
LYMPHOCYTES NFR BLD AUTO: ABNORMAL %
LYMPHOCYTES NFR BLD MANUAL: 24 %
MCH RBC QN AUTO: 31 PG (ref 26.5–33)
MCHC RBC AUTO-ENTMCNC: 35.4 G/DL (ref 31.5–36.5)
MCV RBC AUTO: 87 FL (ref 78–100)
MONOCYTES # BLD AUTO: ABNORMAL 10*3/UL
MONOCYTES # BLD MANUAL: 1.2 10E3/UL (ref 0–1.3)
MONOCYTES NFR BLD AUTO: ABNORMAL %
MONOCYTES NFR BLD MANUAL: 9 %
NEUTROPHILS # BLD AUTO: ABNORMAL 10*3/UL
NEUTROPHILS # BLD MANUAL: 8 10E3/UL (ref 1.6–8.3)
NEUTROPHILS NFR BLD AUTO: ABNORMAL %
NEUTROPHILS NFR BLD MANUAL: 59 %
NRBC # BLD AUTO: 0.8 10E3/UL
NRBC # BLD AUTO: 1.5 10E3/UL
NRBC BLD AUTO-RTO: 6 /100
NRBC BLD MANUAL-RTO: 11 %
PLAT MORPH BLD: ABNORMAL
PLATELET # BLD AUTO: 449 10E3/UL (ref 150–450)
POTASSIUM SERPL-SCNC: 3.8 MMOL/L (ref 3.4–5.3)
PROT SERPL-MCNC: 7.1 G/DL (ref 6.4–8.3)
RBC # BLD AUTO: 2.39 10E6/UL (ref 3.8–5.2)
RBC MORPH BLD: ABNORMAL
RETICS # AUTO: 0.63 10E6/UL (ref 0.03–0.1)
RETICS/RBC NFR AUTO: 26 % (ref 0.5–2)
SICKLE CELLS BLD QL SMEAR: ABNORMAL
SODIUM SERPL-SCNC: 137 MMOL/L (ref 135–145)
WBC # BLD AUTO: 13.6 10E3/UL (ref 4–11)

## 2023-10-21 PROCEDURE — 96361 HYDRATE IV INFUSION ADD-ON: CPT | Performed by: EMERGENCY MEDICINE

## 2023-10-21 PROCEDURE — 258N000003 HC RX IP 258 OP 636: Performed by: EMERGENCY MEDICINE

## 2023-10-21 PROCEDURE — 99285 EMERGENCY DEPT VISIT HI MDM: CPT | Performed by: EMERGENCY MEDICINE

## 2023-10-21 PROCEDURE — 99284 EMERGENCY DEPT VISIT MOD MDM: CPT | Mod: 25 | Performed by: EMERGENCY MEDICINE

## 2023-10-21 PROCEDURE — 85007 BL SMEAR W/DIFF WBC COUNT: CPT | Performed by: EMERGENCY MEDICINE

## 2023-10-21 PROCEDURE — 85045 AUTOMATED RETICULOCYTE COUNT: CPT | Performed by: EMERGENCY MEDICINE

## 2023-10-21 PROCEDURE — 96375 TX/PRO/DX INJ NEW DRUG ADDON: CPT | Performed by: EMERGENCY MEDICINE

## 2023-10-21 PROCEDURE — 80053 COMPREHEN METABOLIC PANEL: CPT | Performed by: EMERGENCY MEDICINE

## 2023-10-21 PROCEDURE — 250N000011 HC RX IP 250 OP 636: Performed by: EMERGENCY MEDICINE

## 2023-10-21 PROCEDURE — 85027 COMPLETE CBC AUTOMATED: CPT | Performed by: EMERGENCY MEDICINE

## 2023-10-21 PROCEDURE — 36415 COLL VENOUS BLD VENIPUNCTURE: CPT | Performed by: EMERGENCY MEDICINE

## 2023-10-21 PROCEDURE — 96374 THER/PROPH/DIAG INJ IV PUSH: CPT | Performed by: EMERGENCY MEDICINE

## 2023-10-21 RX ORDER — SODIUM CHLORIDE, SODIUM LACTATE, POTASSIUM CHLORIDE, CALCIUM CHLORIDE 600; 310; 30; 20 MG/100ML; MG/100ML; MG/100ML; MG/100ML
INJECTION, SOLUTION INTRAVENOUS CONTINUOUS
Status: DISCONTINUED | OUTPATIENT
Start: 2023-10-21 | End: 2023-10-22 | Stop reason: HOSPADM

## 2023-10-21 RX ORDER — KETOROLAC TROMETHAMINE 30 MG/ML
30 INJECTION, SOLUTION INTRAMUSCULAR; INTRAVENOUS ONCE
Status: COMPLETED | OUTPATIENT
Start: 2023-10-21 | End: 2023-10-21

## 2023-10-21 RX ORDER — ONDANSETRON 2 MG/ML
8 INJECTION INTRAMUSCULAR; INTRAVENOUS
Status: DISCONTINUED | OUTPATIENT
Start: 2023-10-21 | End: 2023-10-22 | Stop reason: HOSPADM

## 2023-10-21 RX ADMIN — HYDROMORPHONE HYDROCHLORIDE 1 MG: 1 INJECTION, SOLUTION INTRAMUSCULAR; INTRAVENOUS; SUBCUTANEOUS at 22:52

## 2023-10-21 RX ADMIN — KETOROLAC TROMETHAMINE 30 MG: 30 INJECTION, SOLUTION INTRAMUSCULAR; INTRAVENOUS at 22:55

## 2023-10-21 RX ADMIN — SODIUM CHLORIDE, POTASSIUM CHLORIDE, SODIUM LACTATE AND CALCIUM CHLORIDE: 600; 310; 30; 20 INJECTION, SOLUTION INTRAVENOUS at 22:50

## 2023-10-21 ASSESSMENT — ACTIVITIES OF DAILY LIVING (ADL): ADLS_ACUITY_SCORE: 35

## 2023-10-22 VITALS
DIASTOLIC BLOOD PRESSURE: 78 MMHG | SYSTOLIC BLOOD PRESSURE: 126 MMHG | RESPIRATION RATE: 16 BRPM | HEART RATE: 93 BPM | TEMPERATURE: 98.1 F | OXYGEN SATURATION: 98 %

## 2023-10-22 PROCEDURE — 96376 TX/PRO/DX INJ SAME DRUG ADON: CPT | Performed by: EMERGENCY MEDICINE

## 2023-10-22 PROCEDURE — 250N000011 HC RX IP 250 OP 636: Mod: JZ | Performed by: EMERGENCY MEDICINE

## 2023-10-22 PROCEDURE — 96361 HYDRATE IV INFUSION ADD-ON: CPT | Performed by: EMERGENCY MEDICINE

## 2023-10-22 PROCEDURE — 250N000011 HC RX IP 250 OP 636: Performed by: EMERGENCY MEDICINE

## 2023-10-22 RX ORDER — HEPARIN SODIUM (PORCINE) LOCK FLUSH IV SOLN 100 UNIT/ML 100 UNIT/ML
5-10 SOLUTION INTRAVENOUS
Status: DISCONTINUED | OUTPATIENT
Start: 2023-10-22 | End: 2023-10-22 | Stop reason: HOSPADM

## 2023-10-22 RX ADMIN — HEPARIN 5 ML: 100 SYRINGE at 02:21

## 2023-10-22 RX ADMIN — HYDROMORPHONE HYDROCHLORIDE 1 MG: 1 INJECTION, SOLUTION INTRAMUSCULAR; INTRAVENOUS; SUBCUTANEOUS at 01:28

## 2023-10-22 RX ADMIN — HYDROMORPHONE HYDROCHLORIDE 1 MG: 1 INJECTION, SOLUTION INTRAMUSCULAR; INTRAVENOUS; SUBCUTANEOUS at 00:25

## 2023-10-22 ASSESSMENT — ACTIVITIES OF DAILY LIVING (ADL): ADLS_ACUITY_SCORE: 35

## 2023-10-22 NOTE — ED PROVIDER NOTES
ED Provider Note  [facility]      History     Chief Complaint   Patient presents with    Sickle Cell Pain Crisis     Patient arrives in sickle cell crisis with pain that is notable in the back and her ribs.      HPI    Jennifer Cervantes is a 23 year old female with a hx of sickle cell disease, prior issues with acute pain crises 2/2 sickle cell disease, acute chest syndrome, right-sided hemiplegia 2/2 cerebral infarction (2015), chronic recurrent PE, moderate asthma and cognitive developmental delay.  Patient reports that she began having some low back pain yesterday and this has continued to today.  She has taken oxycodone 15 mg orally x2 doses, ibuprofen, and Robaxin as prescribed for her back pain.  This was not helpful for her.  She reports that the pain does not seem to radiate down the buttocks into the legs.  She denies any recent trauma or injury.  This is very consistent with her previous sickle cell pain flares.  Patient denies any fevers or chills, no nasal congestion or sore throat.  No cough, shortness of breath, or chest pain.  No abdominal pain, nausea, vomiting, or diarrhea.  No dysuria or hematuria.  Patient did go to the infusion clinic yesterday for her pain crisis and received her typical care plan.    She has a Care Plan.    She took a medical cab here today.    Past Medical History  Past Medical History:   Diagnosis Date    Anxiety     Bleeding disorder (H24)     Blood clotting disorder (H24)     Cerebral infarction (H) 2015    Cognitive developmental delay     low IQ. 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 side (H)     right hand contractures    Iron overload due to repeated red blood cell transfusions     Migraines     Multiple subsegmental pulmonary emboli without acute cor pulmonale (H) 02/01/2021    Oppositional defiant behavior      Presence of intrauterine contraceptive device 2/18/2020    Superficial venous thrombosis of arm, right 03/25/2021    Uncomplicated asthma      Past Surgical History:   Procedure Laterality Date    AS INSERT TUNNELED CV 2 CATH W/O PORT/PUMP      CHOLECYSTECTOMY      CV RIGHT HEART CATH MEASUREMENTS RECORDED N/A 11/18/2021    Procedure: Right Heart Cath;  Surgeon: Jackson Stauffer MD;  Location:  HEART CARDIAC CATH LAB    INSERT PORT VASCULAR ACCESS Left 4/21/2021    Procedure: INSERTION, VASCULAR ACCESS PORT (NOT SURE ON SIDE UNTIL REMOVAL);  Surgeon: Rajan More MD;  Location: [facility] OR    IR CHEST PORT PLACEMENT > 5 YRS OF AGE  4/21/2021    IR CVC NON TUNNEL LINE REMOVAL  5/6/2021    IR CVC NON TUNNEL PLACEMENT > 5 YRS  04/07/2020    IR CVC NON TUNNEL PLACEMENT > 5 YRS  4/30/2021    IR CVC NON TUNNEL PLACEMENT > 5 YRS  9/7/2022    IR PORT REMOVAL LEFT  4/21/2021    REMOVE PORT VASCULAR ACCESS Left 4/21/2021    Procedure: REMOVAL, VASCULAR ACCESS PORT LEFT;  Surgeon: Rajan More MD;  Location: [facility] OR    REPAIR TENDON ELBOW Right 10/02/2019    Procedure: Right Elbow Flexor Lengthening, Flexor Pronator Slide Of Wrist and Finger, Thumb Adductor Lengthening;  Surgeon: Anai Franco MD;  Location: [facility] OR    TONSILLECTOMY Bilateral 10/02/2019    Procedure: Bilateral Tonsillectomy;  Surgeon: Farhana Guy MD;  Location: [facility] OR    ZZC BREAST REDUCTION (INCLUDES LIPO) TIER 3 Bilateral 04/23/2019     albuterol (PROAIR HFA/PROVENTIL HFA/VENTOLIN HFA) 108 (90 Base) MCG/ACT inhaler  aspirin (ASA) 81 MG chewable tablet  budesonide-formoterol (SYMBICORT) 160-4.5 MCG/ACT Inhaler  cetirizine (ZYRTEC) 10 MG tablet  deferasirox (JADENU) 360 MG tablet  diphenhydrAMINE (BENADRYL) 25 MG capsule  Hydroxyurea 1000 MG TABS  methocarbamol (ROBAXIN) 750 MG tablet  naloxone (NARCAN) 4 MG/0.1ML nasal spray  ondansetron (ZOFRAN) 8 MG tablet  oxyCODONE IR (ROXICODONE) 15 MG tablet  predniSONE (DELTASONE) 20 MG  tablet  acetaminophen (TYLENOL) 325 MG tablet  albuterol (PROVENTIL) (2.5 MG/3ML) 0.083% neb solution  EPINEPHrine (ANY BX GENERIC EQUIV) 0.3 MG/0.3ML injection 2-pack      Allergies   Allergen Reactions    Contrast Dye      Hives and breathing issues    Fish-Derived Products Hives    Seafood Hives    Gadolinium     Iodinated Contrast Media      Family History  Family History   Problem Relation Age of Onset    Sickle Cell Trait Mother     Hypertension Mother     Asthma Mother     Sickle Cell Trait Father     Glaucoma No family hx of     Macular Degeneration No family hx of     Diabetes No family hx of     Gout No family hx of      Social History   Social History     Tobacco Use    Smoking status: Never     Passive exposure: Never    Smokeless tobacco: Never   Substance Use Topics    Alcohol use: Not Currently     Alcohol/week: 0.0 standard drinks of alcohol    Drug use: Never      Past medical history, past surgical history, medications, allergies, family history, and social history were reviewed with the patient. No additional pertinent items.      A complete review of systems was performed with pertinent positives and negatives noted in the HPI, and all other systems negative.    Physical Exam   BP: 128/84  Pulse: 93  Temp: 98.1  F (36.7  C)  Resp: 16  SpO2: 100 %  Physical Exam  Vitals and nursing note reviewed.   Constitutional:       General: She is not in acute distress.     Appearance: She is not diaphoretic.      Comments: Adult female, sitting back in bed, no acute distress.  Pleasant and cooperative.   HENT:      Head: Atraumatic.      Mouth/Throat:      Mouth: Mucous membranes are moist.      Pharynx: Oropharynx is clear. No oropharyngeal exudate.   Eyes:      General: No scleral icterus.     Pupils: Pupils are equal, round, and reactive to light.   Cardiovascular:      Rate and Rhythm: Normal rate.      Pulses: Normal pulses.      Heart sounds: Normal heart sounds. No murmur heard.  Pulmonary:       Effort: No respiratory distress.      Breath sounds: Normal breath sounds.   Abdominal:      General: Bowel sounds are normal. There is no distension.      Palpations: Abdomen is soft.      Tenderness: There is no abdominal tenderness.   Musculoskeletal:         General: No tenderness.      Comments: Mild midline tenderness to palpation of the low lumbar spine, no paraspinous muscle tenderness to palpation, no CVA tenderness to percussion.  No thoracic spine tenderness to palpation.   Skin:     General: Skin is warm.      Findings: No rash.   Neurological:      Mental Status: She is alert.      Comments: Gait stable    Contracture of right hand, chronic         ED Course, Procedures, & Data      Procedures               Results for orders placed or performed during the hospital encounter of 10/21/23   Comprehensive metabolic panel     Status: Abnormal   Result Value Ref Range    Sodium 137 135 - 145 mmol/L    Potassium 3.8 3.4 - 5.3 mmol/L    Carbon Dioxide (CO2) 23 22 - 29 mmol/L    Anion Gap 11 7 - 15 mmol/L    Urea Nitrogen 16.4 6.0 - 20.0 mg/dL    Creatinine 0.68 0.51 - 0.95 mg/dL    GFR Estimate >90 >60 mL/min/1.73m2    Calcium 8.5 (L) 8.6 - 10.0 mg/dL    Chloride 103 98 - 107 mmol/L    Glucose 86 70 - 99 mg/dL    Alkaline Phosphatase 74 35 - 104 U/L    AST 50 (H) 0 - 45 U/L    ALT 31 0 - 50 U/L    Protein Total 7.1 6.4 - 8.3 g/dL    Albumin 4.4 3.5 - 5.2 g/dL    Bilirubin Total 3.7 (H) <=1.2 mg/dL   Reticulocyte count     Status: Abnormal   Result Value Ref Range    % Reticulocyte 26.0 (H) 0.5 - 2.0 %    Absolute Reticulocyte 0.626 (H) 0.025 - 0.095 10e6/uL   CBC with platelets and differential     Status: Abnormal   Result Value Ref Range    WBC Count 13.6 (H) 4.0 - 11.0 10e3/uL    RBC Count 2.39 (L) 3.80 - 5.20 10e6/uL    Hemoglobin 7.4 (L) 11.7 - 15.7 g/dL    Hematocrit 20.9 (L) 35.0 - 47.0 %    MCV 87 78 - 100 fL    MCH 31.0 26.5 - 33.0 pg    MCHC 35.4 31.5 - 36.5 g/dL    RDW 27.2 (H) 10.0 - 15.0 %     Platelet Count 449 150 - 450 10e3/uL    % Neutrophils      % Lymphocytes      % Monocytes      Mids % (Monos, Eos, Basos)      % Eosinophils      % Basophils      % Immature Granulocytes      NRBCs per 100 WBC 6 (H) <1 /100    Absolute Neutrophils      Absolute Lymphocytes      Absolute Monocytes      Mids Abs (Monos, Eos, Basos)      Absolute Eosinophils      Absolute Basophils      Absolute Immature Granulocytes      Absolute NRBCs 0.8 10e3/uL   Manual Differential     Status: Abnormal   Result Value Ref Range    % Neutrophils 59 %    % Lymphocytes 24 %    % Monocytes 9 %    % Eosinophils 5 %    % Basophils 3 %    NRBCs per 100 WBC 11 (H) <=0 %    Absolute Neutrophils 8.0 1.6 - 8.3 10e3/uL    Absolute Lymphocytes 3.3 0.8 - 5.3 10e3/uL    Absolute Monocytes 1.2 0.0 - 1.3 10e3/uL    Absolute Eosinophils 0.7 0.0 - 0.7 10e3/uL    Absolute Basophils 0.4 (H) 0.0 - 0.2 10e3/uL    Absolute NRBCs 1.5 (H) <=0.0 10e3/uL    RBC Morphology Confirmed RBC Indices     Platelet Assessment  Automated Count Confirmed. Platelet morphology is normal.     Automated Count Confirmed. Platelet morphology is normal.    Sickle Cells Marked (A) None Seen   CBC with Platelets & Differential     Status: Abnormal    Narrative    The following orders were created for panel order CBC with Platelets & Differential.  Procedure                               Abnormality         Status                     ---------                               -----------         ------                     CBC with platelets and d...[[id number]]  Abnormal            Final result               Manual Differential[[id number]]          Abnormal            Final result                 Please view results for these tests on the individual orders.     Medications   HYDROmorphone (DILAUDID) injection 1 mg (1 mg Intravenous $Given 10/22/23 0025)   lactated ringers infusion ( Intravenous $New Bag 10/21/23 0183)   ondansetron (ZOFRAN) injection 8 mg (has no administration in  time range)   ketorolac (TORADOL) injection 30 mg (30 mg Intravenous $Given 10/21/23 8647)     Labs Ordered and Resulted from Time of ED Arrival to Time of ED Departure   COMPREHENSIVE METABOLIC PANEL - Abnormal       Result Value    Sodium 137      Potassium 3.8      Carbon Dioxide (CO2) 23      Anion Gap 11      Urea Nitrogen 16.4      Creatinine 0.68      GFR Estimate >90      Calcium 8.5 (*)     Chloride 103      Glucose 86      Alkaline Phosphatase 74      AST 50 (*)     ALT 31      Protein Total 7.1      Albumin 4.4      Bilirubin Total 3.7 (*)    RETICULOCYTE COUNT - Abnormal    % Reticulocyte 26.0 (*)     Absolute Reticulocyte 0.626 (*)    CBC WITH PLATELETS AND DIFFERENTIAL - Abnormal    WBC Count 13.6 (*)     RBC Count 2.39 (*)     Hemoglobin 7.4 (*)     Hematocrit 20.9 (*)     MCV 87      MCH 31.0      MCHC 35.4      RDW 27.2 (*)     Platelet Count 449      % Neutrophils        % Lymphocytes        % Monocytes        Mids % (Monos, Eos, Basos)        % Eosinophils        % Basophils        % Immature Granulocytes        NRBCs per 100 WBC 6 (*)     Absolute Neutrophils        Absolute Lymphocytes        Absolute Monocytes        Mids Abs (Monos, Eos, Basos)        Absolute Eosinophils        Absolute Basophils        Absolute Immature Granulocytes        Absolute NRBCs 0.8     DIFFERENTIAL - Abnormal    % Neutrophils 59      % Lymphocytes 24      % Monocytes 9      % Eosinophils 5      % Basophils 3      NRBCs per 100 WBC 11 (*)     Absolute Neutrophils 8.0      Absolute Lymphocytes 3.3      Absolute Monocytes 1.2      Absolute Eosinophils 0.7      Absolute Basophils 0.4 (*)     Absolute NRBCs 1.5 (*)     RBC Morphology Confirmed RBC Indices      Platelet Assessment        Value: Automated Count Confirmed. Platelet morphology is normal.    Sickle Cells Marked (*)      No orders to display          Critical care was not performed.     Medical Decision Making  The patient's presentation was of high  complexity (a chronic illness severe exacerbation, progression, or side effect of treatment).    The patient's evaluation involved:  review of external note(s) from 2 sources (see separate area of note for details)  review of 2 test result(s) ordered prior to this encounter (see separate area of note for details)  strong consideration of a test (see separate area of note for details) that was ultimately deferred  ordering and/or review of 3+ test(s) in this encounter (see separate area of note for details)    The patient's management necessitated high risk (a parenteral controlled substance).    Assessment & Plan    Patient presents to the emergency department today for the above complaints.  On my evaluation she is alert, cooperative, no acute distress.  Certainly this does appear to be consistent with previous sickle cell flares.  She does have a care plan which we have ordered for her.    We did access her port, we did draw basic labs.  CBC shows a hemoglobin of 7.4, fairly close to her baseline (most recent comparison is 7.2).  White cell count is 13.6, though patient is afebrile., CMP shows AST of 50 and bilirubin of 3.7, consistent with previous, reticulocyte count is 26% indicating an appropriate response and there is no indication of aplastic crisis.  LR at maintenance was ordered per her care plan.  Dilaudid 1 mg IV every hour x3 doses was ordered.  Toradol 30 mg IV x1 dose has been ordered as well as Zofran 8 mg IV x1 dose PRN ordered for her.  I did discuss with her the possibility of checking a urine on her but she does not believe that she has an infection, does not have any clinical symptoms of urinary tract infection such as dysuria or hematuria, and believes her symptoms are very consistent with her previous sickle cell flares.    After receiving her care plan she reports she feels much improved and would like to be discharged.  We will discharge her with instructions to follow-up with her hematology  clinic.  Patient verbalizes understanding.    I have reviewed the nursing notes. I have reviewed the findings, diagnosis, plan and need for follow up with the patient.    New Prescriptions    No medications on file       Final diagnoses:   Sickle cell pain crisis (H)       Leslie Roper MD   [facility] EMERGENCY DEPARTMENT  10/21/2023     Leslie Roper MD  10/22/23 0054

## 2023-10-22 NOTE — ED TRIAGE NOTES
Patient arrives in sickle cell crisis with pain that is notable in the back and her ribs.      Triage Assessment (Adult)       Row Name 10/21/23 2027          Triage Assessment    Airway WDL WDL        Respiratory WDL    Respiratory WDL WDL        Skin Circulation/Temperature WDL    Skin Circulation/Temperature WDL WDL        Cardiac WDL    Cardiac WDL WDL        Peripheral/Neurovascular WDL    Peripheral Neurovascular WDL WDL        Cognitive/Neuro/Behavioral WDL    Cognitive/Neuro/Behavioral WDL WDL                     
unable to assess

## 2023-10-22 NOTE — DISCHARGE INSTRUCTIONS
You have been seen in the emergency department today for your sickle cell pain.  You have indicated that you feel much improved after receiving your care plan.  Please continue to take your regular medications and follow-up with your hematology clinic as scheduled.  Return to the emergency department for new or worsening symptoms.

## 2023-10-24 ENCOUNTER — INFUSION THERAPY VISIT (OUTPATIENT)
Dept: INFUSION THERAPY | Facility: CLINIC | Age: 23
End: 2023-10-24
Attending: PEDIATRICS
Payer: COMMERCIAL

## 2023-10-24 ENCOUNTER — PATIENT OUTREACH (OUTPATIENT)
Dept: CARE COORDINATION | Facility: CLINIC | Age: 23
End: 2023-10-24
Payer: COMMERCIAL

## 2023-10-24 ENCOUNTER — NURSE TRIAGE (OUTPATIENT)
Dept: ONCOLOGY | Facility: CLINIC | Age: 23
End: 2023-10-24
Payer: COMMERCIAL

## 2023-10-24 VITALS
SYSTOLIC BLOOD PRESSURE: 114 MMHG | DIASTOLIC BLOOD PRESSURE: 72 MMHG | TEMPERATURE: 98.5 F | RESPIRATION RATE: 16 BRPM | HEART RATE: 94 BPM | OXYGEN SATURATION: 94 %

## 2023-10-24 DIAGNOSIS — D57.00 SICKLE CELL PAIN CRISIS (H): Primary | ICD-10-CM

## 2023-10-24 DIAGNOSIS — G81.10 SPASTIC HEMIPLEGIA, UNSPECIFIED ETIOLOGY, UNSPECIFIED LATERALITY (H): ICD-10-CM

## 2023-10-24 PROCEDURE — 96375 TX/PRO/DX INJ NEW DRUG ADDON: CPT

## 2023-10-24 PROCEDURE — 258N000003 HC RX IP 258 OP 636: Performed by: PEDIATRICS

## 2023-10-24 PROCEDURE — 96374 THER/PROPH/DIAG INJ IV PUSH: CPT

## 2023-10-24 PROCEDURE — 250N000011 HC RX IP 250 OP 636: Performed by: PEDIATRICS

## 2023-10-24 PROCEDURE — 96376 TX/PRO/DX INJ SAME DRUG ADON: CPT

## 2023-10-24 PROCEDURE — 250N000013 HC RX MED GY IP 250 OP 250 PS 637: Performed by: PEDIATRICS

## 2023-10-24 PROCEDURE — 96361 HYDRATE IV INFUSION ADD-ON: CPT

## 2023-10-24 RX ORDER — DIPHENHYDRAMINE HCL 25 MG
25 CAPSULE ORAL
Status: COMPLETED | OUTPATIENT
Start: 2023-10-24 | End: 2023-10-24

## 2023-10-24 RX ORDER — ONDANSETRON 8 MG/1
8 TABLET, FILM COATED ORAL
Status: CANCELLED
Start: 2024-01-01

## 2023-10-24 RX ORDER — HEPARIN SODIUM,PORCINE 10 UNIT/ML
5 VIAL (ML) INTRAVENOUS
Status: DISCONTINUED | OUTPATIENT
Start: 2023-10-24 | End: 2023-10-24 | Stop reason: HOSPADM

## 2023-10-24 RX ORDER — DIPHENHYDRAMINE HCL 25 MG
25 CAPSULE ORAL
Status: CANCELLED
Start: 2024-01-01

## 2023-10-24 RX ORDER — KETOROLAC TROMETHAMINE 30 MG/ML
30 INJECTION, SOLUTION INTRAMUSCULAR; INTRAVENOUS ONCE
Status: CANCELLED
Start: 2024-01-01 | End: 2024-01-01

## 2023-10-24 RX ORDER — HEPARIN SODIUM,PORCINE 10 UNIT/ML
5 VIAL (ML) INTRAVENOUS
Status: CANCELLED | OUTPATIENT
Start: 2024-01-01

## 2023-10-24 RX ORDER — HEPARIN SODIUM (PORCINE) LOCK FLUSH IV SOLN 100 UNIT/ML 100 UNIT/ML
5 SOLUTION INTRAVENOUS
Status: CANCELLED | OUTPATIENT
Start: 2024-01-01

## 2023-10-24 RX ORDER — ONDANSETRON 4 MG/1
8 TABLET, ORALLY DISINTEGRATING ORAL
Status: COMPLETED | OUTPATIENT
Start: 2023-10-24 | End: 2023-10-24

## 2023-10-24 RX ORDER — KETOROLAC TROMETHAMINE 30 MG/ML
30 INJECTION, SOLUTION INTRAMUSCULAR; INTRAVENOUS ONCE
Status: COMPLETED | OUTPATIENT
Start: 2023-10-24 | End: 2023-10-24

## 2023-10-24 RX ORDER — HEPARIN SODIUM (PORCINE) LOCK FLUSH IV SOLN 100 UNIT/ML 100 UNIT/ML
5 SOLUTION INTRAVENOUS
Status: DISCONTINUED | OUTPATIENT
Start: 2023-10-24 | End: 2023-10-24 | Stop reason: HOSPADM

## 2023-10-24 RX ORDER — ONDANSETRON 8 MG/1
8 TABLET, FILM COATED ORAL
Status: DISCONTINUED | OUTPATIENT
Start: 2023-10-24 | End: 2023-10-24

## 2023-10-24 RX ADMIN — DIPHENHYDRAMINE HYDROCHLORIDE 25 MG: 25 CAPSULE ORAL at 13:16

## 2023-10-24 RX ADMIN — HYDROMORPHONE HYDROCHLORIDE 1 MG: 1 INJECTION, SOLUTION INTRAMUSCULAR; INTRAVENOUS; SUBCUTANEOUS at 14:59

## 2023-10-24 RX ADMIN — HEPARIN 5 ML: 100 SYRINGE at 15:21

## 2023-10-24 RX ADMIN — HYDROMORPHONE HYDROCHLORIDE 1 MG: 1 INJECTION, SOLUTION INTRAMUSCULAR; INTRAVENOUS; SUBCUTANEOUS at 13:13

## 2023-10-24 RX ADMIN — KETOROLAC TROMETHAMINE 30 MG: 30 INJECTION, SOLUTION INTRAMUSCULAR; INTRAVENOUS at 13:18

## 2023-10-24 RX ADMIN — HYDROMORPHONE HYDROCHLORIDE 1 MG: 1 INJECTION, SOLUTION INTRAMUSCULAR; INTRAVENOUS; SUBCUTANEOUS at 14:11

## 2023-10-24 RX ADMIN — ONDANSETRON 8 MG: 4 TABLET, ORALLY DISINTEGRATING ORAL at 13:30

## 2023-10-24 RX ADMIN — SODIUM CHLORIDE, POTASSIUM CHLORIDE, SODIUM LACTATE AND CALCIUM CHLORIDE 1000 ML: 600; 310; 30; 20 INJECTION, SOLUTION INTRAVENOUS at 13:16

## 2023-10-24 NOTE — TELEPHONE ENCOUNTER
Oncology Nurse Triage - Sickle Cell Pain Crisis:  Situation: Jennifer  calling about Sickle Cell Pain Crisis, requesting to be added on for IV fluids and pain medicine    Background:   Patient's last infusion was 10/21/23 in ED; 10/20/23 in infusion  Last clinic visit date: 10/2/23   Does patient have active treatment plan?  Yes    Assessment of Symptoms:  Onset/Duration of symptoms: 2 day    Is it typical sickle cell pain? Yes   Location: bilateral legs  Character: Sharp, burning           Intensity: 9/10    Any radiation of pain, numbness, tingling, weakness, warmth, swelling, discoloration of arms or legs? Yes- pain in legs only, denies other symptoms above.    Fever?No    Chest Pain Present: No     Shortness of breath: No     Other home therapies tried: WARM BATH     Last home medication taken and when: Oxycodone and Robaxin at 0600 today.     Any Refills Needed?: No     Does patient have transportation & length of time to get to clinic: No - needs transportation set up    Recommendations:   0707 Secure Chat sent to Patricia Mantilla to see if okay for pt to come in, as she was in ED on 10/21.   0710 okayed by Patricia Mantilla to add on to infusion wait list.   0810 call to [facility], spoke w/ scheduling who coordinated w/ nursing, who states they could take pt at 1pm today.   0814 call pt to offer 1pm appt at [facility]. Pt accepting of this time.   0817 message sent to social workers with request to arrange transport to [facility].   0830 paged SW.     If you do not hear from the infusion center by 2pm then you will not be able to get in for an infusion today. If symptoms worsen while waiting for call back, and/or you experience fever, chills, SOB, chest pain, cough, n/v, dizziness, numbness, swelling, discoloration of extremities, then seek emergency evaluation in Emergency Department.     Please note, if you are late for your appt, you risk losing your infusion appt as it may delay another patient's infusion who  arrived on time.

## 2023-10-24 NOTE — PROGRESS NOTES
Community Health Worker Note: Telephone Call  Oncology Clinic     Data/Intervention:  Patient Name:  Jennifer Cervantes DOB/Age: [birth year]     Call From: Triage Nurse        Reason for Call:  Transportation      Assessment:     CHW also called [employer]  ([phone] )  to arrange ride through patient's insurance.  [employer] arranged a round trip ride  Patient will need to call when ready for return ride home [phone]. Talked to patient and she will call [employer] to setup  time.     Plan:  Patient is aware of the transportation plan. /CHW available to assist with any other needs.      Isaura OTTO Community Health Worker  Clinic Care Coordination  [facility]  Phone: [phone]

## 2023-10-24 NOTE — PROGRESS NOTES
Infusion Nursing Note:  Jennifer Cervantes presents today for FLuids, meds.    Patient seen by provider today: No   present during visit today: Not Applicable.    Note: Benadryl and Zofran given PO.  Dilaudid and Toradol given IV push.      Intravenous Access:  Implanted Port.    Treatment Conditions:  Per pain protocol.      Post Infusion Assessment:  Patient tolerated infusion without incident.  No evidence of extravasations.  Access discontinued per protocol.       Discharge Plan:   Patient and/or family verbalized understanding of discharge instructions and all questions answered.  Patient discharged in stable condition accompanied by: self.      PRASANNA TRONCOSO RN

## 2023-10-25 ENCOUNTER — PATIENT OUTREACH (OUTPATIENT)
Dept: CARE COORDINATION | Facility: CLINIC | Age: 23
End: 2023-10-25

## 2023-10-25 ENCOUNTER — OFFICE VISIT (OUTPATIENT)
Dept: OPHTHALMOLOGY | Facility: CLINIC | Age: 23
End: 2023-10-25
Payer: COMMERCIAL

## 2023-10-25 ENCOUNTER — NURSE TRIAGE (OUTPATIENT)
Dept: ONCOLOGY | Facility: CLINIC | Age: 23
End: 2023-10-25

## 2023-10-25 ENCOUNTER — INFUSION THERAPY VISIT (OUTPATIENT)
Dept: INFUSION THERAPY | Facility: CLINIC | Age: 23
End: 2023-10-25
Attending: PEDIATRICS
Payer: COMMERCIAL

## 2023-10-25 VITALS
SYSTOLIC BLOOD PRESSURE: 113 MMHG | OXYGEN SATURATION: 94 % | DIASTOLIC BLOOD PRESSURE: 71 MMHG | TEMPERATURE: 99.1 F | HEART RATE: 77 BPM | RESPIRATION RATE: 16 BRPM

## 2023-10-25 DIAGNOSIS — D57.09 SICKLE CELL DISEASE WITH CRISIS AND OTHER COMPLICATION (H): Primary | ICD-10-CM

## 2023-10-25 DIAGNOSIS — H35.412 LATTICE DEGENERATION OF RETINA, LEFT EYE: ICD-10-CM

## 2023-10-25 DIAGNOSIS — H52.4 ACCOMMODATIVE INSUFFICIENCY: ICD-10-CM

## 2023-10-25 DIAGNOSIS — D57.00 SICKLE CELL PAIN CRISIS (H): Primary | ICD-10-CM

## 2023-10-25 DIAGNOSIS — G81.10 SPASTIC HEMIPLEGIA, UNSPECIFIED ETIOLOGY, UNSPECIFIED LATERALITY (H): ICD-10-CM

## 2023-10-25 PROCEDURE — 92014 COMPRE OPH EXAM EST PT 1/>: CPT | Performed by: OPTOMETRIST

## 2023-10-25 PROCEDURE — 258N000003 HC RX IP 258 OP 636: Performed by: PEDIATRICS

## 2023-10-25 PROCEDURE — 96361 HYDRATE IV INFUSION ADD-ON: CPT

## 2023-10-25 PROCEDURE — 96375 TX/PRO/DX INJ NEW DRUG ADDON: CPT

## 2023-10-25 PROCEDURE — 250N000011 HC RX IP 250 OP 636: Performed by: PEDIATRICS

## 2023-10-25 PROCEDURE — 92015 DETERMINE REFRACTIVE STATE: CPT | Performed by: OPTOMETRIST

## 2023-10-25 PROCEDURE — 96374 THER/PROPH/DIAG INJ IV PUSH: CPT

## 2023-10-25 PROCEDURE — 250N000013 HC RX MED GY IP 250 OP 250 PS 637: Performed by: PEDIATRICS

## 2023-10-25 PROCEDURE — 96376 TX/PRO/DX INJ SAME DRUG ADON: CPT

## 2023-10-25 RX ORDER — HEPARIN SODIUM (PORCINE) LOCK FLUSH IV SOLN 100 UNIT/ML 100 UNIT/ML
5 SOLUTION INTRAVENOUS
Status: DISCONTINUED | OUTPATIENT
Start: 2023-10-25 | End: 2023-10-25 | Stop reason: HOSPADM

## 2023-10-25 RX ORDER — HEPARIN SODIUM (PORCINE) LOCK FLUSH IV SOLN 100 UNIT/ML 100 UNIT/ML
5 SOLUTION INTRAVENOUS
Status: CANCELLED | OUTPATIENT
Start: 2024-01-01

## 2023-10-25 RX ORDER — HEPARIN SODIUM,PORCINE 10 UNIT/ML
5 VIAL (ML) INTRAVENOUS
Status: CANCELLED | OUTPATIENT
Start: 2024-01-01

## 2023-10-25 RX ORDER — KETOROLAC TROMETHAMINE 30 MG/ML
30 INJECTION, SOLUTION INTRAMUSCULAR; INTRAVENOUS ONCE
Status: COMPLETED | OUTPATIENT
Start: 2023-10-25 | End: 2023-10-25

## 2023-10-25 RX ORDER — DIPHENHYDRAMINE HCL 25 MG
25 CAPSULE ORAL
Status: COMPLETED | OUTPATIENT
Start: 2023-10-25 | End: 2023-10-25

## 2023-10-25 RX ORDER — KETOROLAC TROMETHAMINE 30 MG/ML
30 INJECTION, SOLUTION INTRAMUSCULAR; INTRAVENOUS ONCE
Status: CANCELLED
Start: 2024-01-01 | End: 2024-01-01

## 2023-10-25 RX ORDER — ONDANSETRON 4 MG/1
8 TABLET, ORALLY DISINTEGRATING ORAL
Status: COMPLETED | OUTPATIENT
Start: 2023-10-25 | End: 2023-10-25

## 2023-10-25 RX ORDER — ONDANSETRON 8 MG/1
8 TABLET, FILM COATED ORAL
Status: CANCELLED
Start: 2024-01-01

## 2023-10-25 RX ORDER — DIPHENHYDRAMINE HCL 25 MG
25 CAPSULE ORAL
Status: CANCELLED
Start: 2024-01-01

## 2023-10-25 RX ADMIN — HEPARIN 5 ML: 100 SYRINGE at 16:18

## 2023-10-25 RX ADMIN — ONDANSETRON 8 MG: 4 TABLET, ORALLY DISINTEGRATING ORAL at 13:49

## 2023-10-25 RX ADMIN — HYDROMORPHONE HYDROCHLORIDE 1 MG: 1 INJECTION, SOLUTION INTRAMUSCULAR; INTRAVENOUS; SUBCUTANEOUS at 13:47

## 2023-10-25 RX ADMIN — DIPHENHYDRAMINE HYDROCHLORIDE 25 MG: 25 CAPSULE ORAL at 13:49

## 2023-10-25 RX ADMIN — HYDROMORPHONE HYDROCHLORIDE 1 MG: 1 INJECTION, SOLUTION INTRAMUSCULAR; INTRAVENOUS; SUBCUTANEOUS at 15:49

## 2023-10-25 RX ADMIN — HYDROMORPHONE HYDROCHLORIDE 1 MG: 1 INJECTION, SOLUTION INTRAMUSCULAR; INTRAVENOUS; SUBCUTANEOUS at 14:44

## 2023-10-25 RX ADMIN — SODIUM CHLORIDE, POTASSIUM CHLORIDE, SODIUM LACTATE AND CALCIUM CHLORIDE 1000 ML: 600; 310; 30; 20 INJECTION, SOLUTION INTRAVENOUS at 13:46

## 2023-10-25 RX ADMIN — KETOROLAC TROMETHAMINE 30 MG: 30 INJECTION, SOLUTION INTRAMUSCULAR; INTRAVENOUS at 13:51

## 2023-10-25 ASSESSMENT — CONF VISUAL FIELD
OD_NORMAL: 1
OD_SUPERIOR_NASAL_RESTRICTION: 0
OS_SUPERIOR_TEMPORAL_RESTRICTION: 0
OS_INFERIOR_NASAL_RESTRICTION: 0
OD_SUPERIOR_TEMPORAL_RESTRICTION: 0
OD_INFERIOR_NASAL_RESTRICTION: 0
OD_INFERIOR_TEMPORAL_RESTRICTION: 0
OS_SUPERIOR_NASAL_RESTRICTION: 0
OS_INFERIOR_TEMPORAL_RESTRICTION: 0
OS_NORMAL: 1
METHOD: COUNTING FINGERS

## 2023-10-25 ASSESSMENT — REFRACTION_MANIFEST
OS_ADD: +1.00
OD_SPHERE: +0.50
OS_CYLINDER: SPHERE
OD_CYLINDER: +0.25
OS_SPHERE: +0.50
OD_AXIS: 145
OD_ADD: +1.00

## 2023-10-25 ASSESSMENT — CUP TO DISC RATIO
OD_RATIO: 0.5
OS_RATIO: 0.5

## 2023-10-25 ASSESSMENT — EXTERNAL EXAM - LEFT EYE: OS_EXAM: NORMAL

## 2023-10-25 ASSESSMENT — SLIT LAMP EXAM - LIDS: COMMENTS: TRACE MGD

## 2023-10-25 ASSESSMENT — EXTERNAL EXAM - RIGHT EYE: OD_EXAM: NORMAL

## 2023-10-25 ASSESSMENT — TONOMETRY
IOP_METHOD: TONOPEN
OD_IOP_MMHG: 20
OS_IOP_MMHG: 19

## 2023-10-25 ASSESSMENT — PAIN SCALES - GENERAL: PAINLEVEL: EXTREME PAIN (9)

## 2023-10-25 ASSESSMENT — VISUAL ACUITY
OS_SC: 20/15
OS_SC+: -3
OD_SC: 20/20
METHOD: SNELLEN - LINEAR

## 2023-10-25 NOTE — PROGRESS NOTES
Assessment/Plan  (D57.09) Sickle cell disease with crisis and other complication (H)  (primary encounter diagnosis)  Plan: Monitor annually with dilated exam. Return to clinic with new flashes, floaters, or curtain over vision.     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     (H52.4) Accommodative insufficiency  Plan: REFRACTION [[id number]]        Updated Rx. Ok to use full time, but will primarily be helpful for near. Return with any adaptation concerns.     Complete documentation of historical and exam elements from today's encounter can  be found in the full encounter summary report (not reduplicated in this progress  note). I personally obtained the chief complaint(s) and history of present illness. I  confirmed and edited as necessary the review of systems, past medical/surgical  history, family history, social history, and examination findings as documented by  others; and I examined the patient myself. I personally reviewed the relevant tests,  images, and reports as documented above. I formulated and edited as necessary the  assessment and plan and discussed the findings and management plan with the  patient and family.    Usama Mcclendon, OD

## 2023-10-25 NOTE — NURSING NOTE
Chief Complaints and History of Present Illnesses   Patient presents with    Annual Eye Exam     Here for annual eye exam     Chief Complaint(s) and History of Present Illness(es)       Annual Eye Exam              Associated symptoms: Negative for eye pain    Treatments tried: no treatments    Pain scale: 0/10    Comments: Here for annual eye exam              Comments    Pt states her current pair of reading glasses are broken.   She is still having difficulty with near vision.   No eye pain or discomfort.   Denies using eyedrops.     Tu Phoenix 8:17 AM October 25, 2023

## 2023-10-25 NOTE — TELEPHONE ENCOUNTER
Oncology Nurse Triage - Sickle Cell Pain Crisis:  Situation: Jennifer  calling about Sickle Cell Pain Crisis, requesting to be added on for IV fluids and pain medicine     Background:   Patient's last infusion was 10/24/23  Last clinic visit date: 10/2/23 w/ Dr. Duncan  Does patient have active treatment plan?  Yes     Assessment of Symptoms:  Onset/Duration of symptoms: 3 days     Is it typical sickle cell pain? Yes   Location: bilateral legs  Character: Sharp           Intensity: 8/10     Any radiation of pain, numbness, tingling, weakness, warmth, swelling, discoloration of arms or legs? Yes- pain in legs only, denies other symptoms above.     Fever? No     Chest Pain Present: No      Shortness of breath: No      Other home therapies tried: WARM BATH      Last home medication taken and when: Oxycodone and Robaxin at 0600 today.      Any Refills Needed?: No      Does patient have transportation & length of time to get to clinic: Pt has appt at 0840 today, if called after for IVF will need transportation    Recommendations:  0707 paged Dr. Duncan for approval.   0755 secure chat to Arely, JOECC, and Dr. Duncan.   0804 per Arely/Dr. Duncan, pt can come into infusion.   0919 LVM for pt offering 2pm slot at [facility], informed she would likely only get 2 doses of pain meds, as they close at 4pm, requested pt call triage line at [phone] to confirm.  0934 return call from pt, who states she will take the [place] appt if there are no other infusion appts available.   0935 call to [place] [facility], spoke w/ Erendira, who will get pt scheduled for 2pm.   0938 message sent to  to help arrange transportation.     If you do not hear from the infusion center by 2pm then you will not be able to get in for an infusion today. If symptoms worsen while waiting for call back, and/or you experience fever, chills, SOB, chest pain, cough, n/v, dizziness, numbness, swelling, discoloration of extremities, then seek  emergency evaluation in Emergency Department.      Please note, if you are late for your appt, you risk losing your infusion appt as it may delay another patient's infusion who arrived on time.

## 2023-10-25 NOTE — PROGRESS NOTES
Social Work - Transportation  [facility]    Data/Intervention:  Patient Name: Jennifer Cervantes Goes By: Jennifer    /Age: 2000 (23 year old)    [facility]  Reason for Referral:  support requested for patient transportation needs for today's appointment.  Assessment:  called [employer] to arrange ride through patient's insurance. [employer]  arranged  for patient from home with [employer]. Patient will need to call [phone] when ready for return ride home.  Plan: Patient is aware of the transportation plan.  available to assist with any other needs.    CARLOS Chavez,UDAY  Hematology/Oncology Social Worker  Phone:[phone] Pager: [phone]

## 2023-10-25 NOTE — PROGRESS NOTES
"Infusion Nursing Note:  Jennifer Cervantes presents today for fluids and pain meds.    Patient seen by provider today: No   present during visit today: Not Applicable.    Note: Patient rating pain a \"9\" in bilateral upper legs.  States she is trying to avoid a full blown crisis.  Following meds and fluids, patient rates pain a ''3'  And states that this is acceptable.    Intravenous Access:  Implanted Port.    Treatment Conditions:  Not Applicable.      Post Infusion Assessment:  Patient tolerated infusion without incident.  Blood return noted pre and post infusion.  Site patent and intact, free from redness, edema or discomfort.  No evidence of extravasations.  Access discontinued per protocol.       Discharge Plan:   Patient discharged in stable condition accompanied by: self.  Departure Mode: Ambulatory.  Will follow up with provider    Zaynab Barron RN    "

## 2023-10-27 ENCOUNTER — HOSPITAL ENCOUNTER (EMERGENCY)
Facility: CLINIC | Age: 23
Discharge: HOME | End: 2023-10-27
Attending: EMERGENCY MEDICINE | Admitting: EMERGENCY MEDICINE
Payer: COMMERCIAL

## 2023-10-27 ENCOUNTER — TELEPHONE (OUTPATIENT)
Dept: PHARMACY | Facility: OTHER | Age: 23
End: 2023-10-27

## 2023-10-27 VITALS
DIASTOLIC BLOOD PRESSURE: 57 MMHG | WEIGHT: 150 LBS | SYSTOLIC BLOOD PRESSURE: 119 MMHG | RESPIRATION RATE: 18 BRPM | TEMPERATURE: 97.7 F | OXYGEN SATURATION: 95 % | HEIGHT: 64 IN | BODY MASS INDEX: 25.61 KG/M2 | HEART RATE: 85 BPM

## 2023-10-27 DIAGNOSIS — D57.00 SICKLE CELL PAIN CRISIS (H): ICD-10-CM

## 2023-10-27 DIAGNOSIS — K29.00 OTHER ACUTE GASTRITIS WITHOUT HEMORRHAGE: ICD-10-CM

## 2023-10-27 LAB
ANION GAP SERPL CALCULATED.3IONS-SCNC: 11 MMOL/L (ref 7–15)
BASOPHILS # BLD AUTO: 0.3 10E3/UL (ref 0–0.2)
BASOPHILS NFR BLD AUTO: 2 %
BUN SERPL-MCNC: 11.7 MG/DL (ref 6–20)
CALCIUM SERPL-MCNC: 8.9 MG/DL (ref 8.6–10)
CHLORIDE SERPL-SCNC: 107 MMOL/L (ref 98–107)
CREAT SERPL-MCNC: 0.75 MG/DL (ref 0.51–0.95)
DEPRECATED HCO3 PLAS-SCNC: 22 MMOL/L (ref 22–29)
EGFRCR SERPLBLD CKD-EPI 2021: >90 ML/MIN/1.73M2
EOSINOPHIL # BLD AUTO: 0.5 10E3/UL (ref 0–0.7)
EOSINOPHIL NFR BLD AUTO: 3 %
ERYTHROCYTE [DISTWIDTH] IN BLOOD BY AUTOMATED COUNT: 28 % (ref 10–15)
GLUCOSE SERPL-MCNC: 91 MG/DL (ref 70–99)
HCT VFR BLD AUTO: 20.8 % (ref 35–47)
HGB BLD-MCNC: 7.1 G/DL (ref 11.7–15.7)
HOLD SPECIMEN: NORMAL
IMM GRANULOCYTES # BLD: 0.5 10E3/UL
IMM GRANULOCYTES NFR BLD: 4 %
LYMPHOCYTES # BLD AUTO: 2.1 10E3/UL (ref 0.8–5.3)
LYMPHOCYTES NFR BLD AUTO: 15 %
MCH RBC QN AUTO: 30.6 PG (ref 26.5–33)
MCHC RBC AUTO-ENTMCNC: 34.1 G/DL (ref 31.5–36.5)
MCV RBC AUTO: 90 FL (ref 78–100)
MONOCYTES # BLD AUTO: 0.9 10E3/UL (ref 0–1.3)
MONOCYTES NFR BLD AUTO: 6 %
NEUTROPHILS # BLD AUTO: 9.8 10E3/UL (ref 1.6–8.3)
NEUTROPHILS NFR BLD AUTO: 70 %
NRBC # BLD AUTO: 0.7 10E3/UL
NRBC BLD AUTO-RTO: 5 /100
PLATELET # BLD AUTO: 398 10E3/UL (ref 150–450)
POTASSIUM SERPL-SCNC: 4.2 MMOL/L (ref 3.4–5.3)
RBC # BLD AUTO: 2.32 10E6/UL (ref 3.8–5.2)
SODIUM SERPL-SCNC: 140 MMOL/L (ref 135–145)
WBC # BLD AUTO: 14.1 10E3/UL (ref 4–11)

## 2023-10-27 PROCEDURE — 99285 EMERGENCY DEPT VISIT HI MDM: CPT | Performed by: EMERGENCY MEDICINE

## 2023-10-27 PROCEDURE — 85014 HEMATOCRIT: CPT | Performed by: EMERGENCY MEDICINE

## 2023-10-27 PROCEDURE — 96376 TX/PRO/DX INJ SAME DRUG ADON: CPT | Performed by: EMERGENCY MEDICINE

## 2023-10-27 PROCEDURE — 80048 BASIC METABOLIC PNL TOTAL CA: CPT | Performed by: EMERGENCY MEDICINE

## 2023-10-27 PROCEDURE — 96374 THER/PROPH/DIAG INJ IV PUSH: CPT | Performed by: EMERGENCY MEDICINE

## 2023-10-27 PROCEDURE — 258N000003 HC RX IP 258 OP 636: Performed by: EMERGENCY MEDICINE

## 2023-10-27 PROCEDURE — 36415 COLL VENOUS BLD VENIPUNCTURE: CPT | Performed by: EMERGENCY MEDICINE

## 2023-10-27 PROCEDURE — 250N000011 HC RX IP 250 OP 636: Mod: JZ | Performed by: EMERGENCY MEDICINE

## 2023-10-27 PROCEDURE — 96375 TX/PRO/DX INJ NEW DRUG ADDON: CPT | Performed by: EMERGENCY MEDICINE

## 2023-10-27 PROCEDURE — 96361 HYDRATE IV INFUSION ADD-ON: CPT | Performed by: EMERGENCY MEDICINE

## 2023-10-27 PROCEDURE — 99285 EMERGENCY DEPT VISIT HI MDM: CPT | Mod: 25 | Performed by: EMERGENCY MEDICINE

## 2023-10-27 RX ORDER — HEPARIN SODIUM (PORCINE) LOCK FLUSH IV SOLN 100 UNIT/ML 100 UNIT/ML
5-10 SOLUTION INTRAVENOUS
Status: DISCONTINUED | OUTPATIENT
Start: 2023-10-27 | End: 2023-10-27 | Stop reason: HOSPADM

## 2023-10-27 RX ORDER — ONDANSETRON 2 MG/ML
4 INJECTION INTRAMUSCULAR; INTRAVENOUS EVERY 30 MIN PRN
Status: DISCONTINUED | OUTPATIENT
Start: 2023-10-27 | End: 2023-10-27 | Stop reason: HOSPADM

## 2023-10-27 RX ORDER — OXYCODONE HYDROCHLORIDE 15 MG/1
15 TABLET ORAL EVERY 4 HOURS PRN
Qty: 25 TABLET | Refills: 0 | Status: SHIPPED | OUTPATIENT
Start: 2023-10-27 | End: 2023-11-03

## 2023-10-27 RX ORDER — KETOROLAC TROMETHAMINE 30 MG/ML
30 INJECTION, SOLUTION INTRAMUSCULAR; INTRAVENOUS ONCE
Status: COMPLETED | OUTPATIENT
Start: 2023-10-27 | End: 2023-10-27

## 2023-10-27 RX ADMIN — HYDROMORPHONE HYDROCHLORIDE 1 MG: 1 INJECTION, SOLUTION INTRAMUSCULAR; INTRAVENOUS; SUBCUTANEOUS at 14:43

## 2023-10-27 RX ADMIN — SODIUM CHLORIDE 1000 ML: 9 INJECTION, SOLUTION INTRAVENOUS at 08:14

## 2023-10-27 RX ADMIN — HYDROMORPHONE HYDROCHLORIDE 1 MG: 1 INJECTION, SOLUTION INTRAMUSCULAR; INTRAVENOUS; SUBCUTANEOUS at 13:45

## 2023-10-27 RX ADMIN — HYDROMORPHONE HYDROCHLORIDE 1 MG: 1 INJECTION, SOLUTION INTRAMUSCULAR; INTRAVENOUS; SUBCUTANEOUS at 12:19

## 2023-10-27 RX ADMIN — KETOROLAC TROMETHAMINE 30 MG: 30 INJECTION, SOLUTION INTRAMUSCULAR; INTRAVENOUS at 09:30

## 2023-10-27 RX ADMIN — Medication 5 ML: at 14:52

## 2023-10-27 RX ADMIN — ONDANSETRON 4 MG: 2 INJECTION INTRAMUSCULAR; INTRAVENOUS at 09:31

## 2023-10-27 RX ADMIN — SODIUM CHLORIDE 1000 ML: 9 INJECTION, SOLUTION INTRAVENOUS at 09:30

## 2023-10-27 ASSESSMENT — ACTIVITIES OF DAILY LIVING (ADL)
ADLS_ACUITY_SCORE: 35
ADLS_ACUITY_SCORE: 33
ADLS_ACUITY_SCORE: 33
ADLS_ACUITY_SCORE: 35

## 2023-10-27 NOTE — TELEPHONE ENCOUNTER
Narcotic Refill Request    Medication(s) requested:  Oxycodone 15 mg  Person Requesting Refill: Patient  What pain is the medication treating: Sickle cell pain  How is the medication being taken?: Typically every 6 hrs, but has been taking it every 4hrs along with ibuprofen and muscle relaxant  Does pt have enough for today? No  Is pain being adequately controlled on the current regimen?: Yes  Experiencing any side effects from medication?: No    Date of most recent appointment:  10/02/23 w/  Any No Show Visits:No  Next appointment:   11/03/23 w/Patricia Mantilla  Last fill date and by whom:  10/20/23 by    Reviewed: No access    Routed/Paged provider:

## 2023-10-27 NOTE — ED TRIAGE NOTES
Lower back pain woke her from sleep. Has used heat and pain medication without relief.      Triage Assessment (Adult)       Row Name 10/27/23 0747          Triage Assessment    Airway WDL WDL        Respiratory WDL    Respiratory WDL WDL        Skin Circulation/Temperature WDL    Skin Circulation/Temperature WDL WDL        Cardiac WDL    Cardiac WDL WDL

## 2023-10-27 NOTE — TELEPHONE ENCOUNTER
MTM referral from: Patient's insurance     MTM referral outreach attempt #1 on October 27, 2023       Outcome: would like visit again this year, prefers Monday, scheduled.    Patricia Blandon, PharmD  MTM Pharmacist  [facility]

## 2023-10-27 NOTE — ED PROVIDER NOTES
ED Provider Note  [facility]      History     Chief Complaint   Patient presents with    Sickle Cell Pain Crisis     back     HPI  Jennifer Cervantes is a 23 year old female with h/o sickle cell disease, recurrent sick cell acute pain crises, prior acute chest syndrome, R-sided hemiplegia 2/2 cerebral infarct (2015), chronic recurrent PE, moderate asthma. Presents with sudden-onset midline lower back pain. Reports that pain is identical to previous sickle cell pain episodes, most recently last week. Began ~0430 AM after getting up to use the bathroom. Went back to bed and reports immediate 10/10 pain. Current severity still 10/10. Pain is in central back, deep, and does not radiate. Prior to arrival, has taken typical PTA meds (15mg oxycodone x1, ibuprofen, robaxin) as well as heat without relief. Denies any SOB, cough, worsening wheezing, or increased need for asthma meds. Denies urinary symptoms. No known sick contacts or infectious symptoms. No abdominal pain or change in BMs. Most recent BM today normal without blood. Has been taking PTA medications as prescribed without any recent changes.    Past Medical History  Past Medical History:   Diagnosis Date    Anxiety     Bleeding disorder (H24)     Blood clotting disorder (H24)     Cerebral infarction (H) 2015    Cognitive developmental delay     low IQ. 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 side (H)     right hand contractures    Iron overload due to repeated red blood cell transfusions     Migraines     Multiple subsegmental pulmonary emboli without acute cor pulmonale (H) 02/01/2021    Oppositional defiant behavior     Presence of intrauterine contraceptive device 2/18/2020    Superficial venous thrombosis of arm, right 03/25/2021    Uncomplicated asthma      Past Surgical History:   Procedure Laterality Date     AS INSERT TUNNELED CV 2 CATH W/O PORT/PUMP      CHOLECYSTECTOMY      CV RIGHT HEART CATH MEASUREMENTS RECORDED N/A 11/18/2021    Procedure: Right Heart Cath;  Surgeon: Jackson Stauffer MD;  Location:  HEART CARDIAC CATH LAB    INSERT PORT VASCULAR ACCESS Left 4/21/2021    Procedure: INSERTION, VASCULAR ACCESS PORT (NOT SURE ON SIDE UNTIL REMOVAL);  Surgeon: Rajan More MD;  Location: [facility] OR    IR CHEST PORT PLACEMENT > 5 YRS OF AGE  4/21/2021    IR CVC NON TUNNEL LINE REMOVAL  5/6/2021    IR CVC NON TUNNEL PLACEMENT > 5 YRS  04/07/2020    IR CVC NON TUNNEL PLACEMENT > 5 YRS  4/30/2021    IR CVC NON TUNNEL PLACEMENT > 5 YRS  9/7/2022    IR PORT REMOVAL LEFT  4/21/2021    REMOVE PORT VASCULAR ACCESS Left 4/21/2021    Procedure: REMOVAL, VASCULAR ACCESS PORT LEFT;  Surgeon: Rajan More MD;  Location: [facility] OR    REPAIR TENDON ELBOW Right 10/02/2019    Procedure: Right Elbow Flexor Lengthening, Flexor Pronator Slide Of Wrist and Finger, Thumb Adductor Lengthening;  Surgeon: Anai Franco MD;  Location: [facility] OR    TONSILLECTOMY Bilateral 10/02/2019    Procedure: Bilateral Tonsillectomy;  Surgeon: Farhana Guy MD;  Location: [facility] OR    ZZC BREAST REDUCTION (INCLUDES LIPO) TIER 3 Bilateral 04/23/2019     acetaminophen (TYLENOL) 325 MG tablet  albuterol (PROAIR HFA/PROVENTIL HFA/VENTOLIN HFA) 108 (90 Base) MCG/ACT inhaler  albuterol (PROVENTIL) (2.5 MG/3ML) 0.083% neb solution  aspirin (ASA) 81 MG chewable tablet  budesonide-formoterol (SYMBICORT) 160-4.5 MCG/ACT Inhaler  cetirizine (ZYRTEC) 10 MG tablet  deferasirox (JADENU) 360 MG tablet  diphenhydrAMINE (BENADRYL) 25 MG capsule  EPINEPHrine (ANY BX GENERIC EQUIV) 0.3 MG/0.3ML injection 2-pack  Hydroxyurea 1000 MG TABS  methocarbamol (ROBAXIN) 750 MG tablet  naloxone (NARCAN) 4 MG/0.1ML nasal spray  ondansetron (ZOFRAN) 8 MG tablet  oxyCODONE IR (ROXICODONE) 15 MG tablet  predniSONE (DELTASONE) 20 MG tablet      Allergies   Allergen Reactions  "   Contrast Dye      Hives and breathing issues    Fish-Derived Products Hives    Seafood Hives    Gadolinium     Iodinated Contrast Media      Family History  Family History   Problem Relation Age of Onset    Sickle Cell Trait Mother     Hypertension Mother     Asthma Mother     Sickle Cell Trait Father     Glaucoma No family hx of     Macular Degeneration No family hx of     Diabetes No family hx of     Gout No family hx of      Social History   Social History     Tobacco Use    Smoking status: Never     Passive exposure: Never    Smokeless tobacco: Never   Substance Use Topics    Alcohol use: Not Currently     Alcohol/week: 0.0 standard drinks of alcohol    Drug use: Never      Past medical history, past surgical history, medications, allergies, family history, and social history were reviewed with the patient. No additional pertinent items.      A complete review of systems was performed with pertinent positives and negatives noted in the HPI, and all other systems negative.    Physical Exam   BP: 134/83  Pulse: 62  Temp: 97.7  F (36.5  C)  Resp: 18  Height: 162.6 cm (5' 4\")  Weight: 68 kg (150 lb)  SpO2: 95 %  Physical Exam  Physical Exam   Constitutional: Alert, oriented, no acute distress. Appears to be in discomfort.  HENT: Normocephalic and atraumatic. EOMI. Pupils equal, round, and reactive.   Pulmonary/Chest: Mild diffuse wheezes. Effort normal. No respiratory distress.   Cardiac: RRR, no murmurs  Abdominal: Abdomen soft, nontender, nondistended. No rebound tenderness.  MSK: Mild midline lumbar tenderness, no paraspinal or CVA tenderness. Chronic contracture of R hand. Long bones without deformity or evidence of trauma  Neurological: No focal deficits. Moving extremities freely. 5/5 strength bilaterally.  Skin: Skin is warm and dry.   Psychiatric:  normal mood and affect.  behavior is normal. Thought content normal.       ED Course, Procedures, & Data      Procedures              Results for orders placed " or performed during the hospital encounter of 10/27/23   [place] Draw     Status: None    Narrative    The following orders were created for panel order [place] Draw.  Procedure                               Abnormality         Status                     ---------                               -----------         ------                     Extra Blue Top Tube[[id number]]                              Final result               Extra Red Top Tube[[id number]]                               Final result               Extra Green Top (Lithium...[[id number]]                      Final result               Extra Purple Top Tube[[id number]]                            Final result                 Please view results for these tests on the individual orders.   Extra Blue Top Tube     Status: None   Result Value Ref Range    Hold Specimen JIC    Extra Red Top Tube     Status: None   Result Value Ref Range    Hold Specimen JIC    Extra Green Top (Lithium Heparin) Tube     Status: None   Result Value Ref Range    Hold Specimen JIC    Extra Purple Top Tube     Status: None   Result Value Ref Range    Hold Specimen JIC    Basic metabolic panel     Status: Normal   Result Value Ref Range    Sodium 140 135 - 145 mmol/L    Potassium 4.2 3.4 - 5.3 mmol/L    Chloride 107 98 - 107 mmol/L    Carbon Dioxide (CO2) 22 22 - 29 mmol/L    Anion Gap 11 7 - 15 mmol/L    Urea Nitrogen 11.7 6.0 - 20.0 mg/dL    Creatinine 0.75 0.51 - 0.95 mg/dL    GFR Estimate >90 >60 mL/min/1.73m2    Calcium 8.9 8.6 - 10.0 mg/dL    Glucose 91 70 - 99 mg/dL   CBC with platelets and differential     Status: Abnormal   Result Value Ref Range    WBC Count 14.1 (H) 4.0 - 11.0 10e3/uL    RBC Count 2.32 (L) 3.80 - 5.20 10e6/uL    Hemoglobin 7.1 (L) 11.7 - 15.7 g/dL    Hematocrit 20.8 (L) 35.0 - 47.0 %    MCV 90 78 - 100 fL    MCH 30.6 26.5 - 33.0 pg    MCHC 34.1 31.5 - 36.5 g/dL    RDW 28.0 (H) 10.0 - 15.0 %    Platelet Count 398 150 - 450 10e3/uL    % Neutrophils 70 %    %  Lymphocytes 15 %    % Monocytes 6 %    % Eosinophils 3 %    % Basophils 2 %    % Immature Granulocytes 4 %    NRBCs per 100 WBC 5 (H) <1 /100    Absolute Neutrophils 9.8 (H) 1.6 - 8.3 10e3/uL    Absolute Lymphocytes 2.1 0.8 - 5.3 10e3/uL    Absolute Monocytes 0.9 0.0 - 1.3 10e3/uL    Absolute Eosinophils 0.5 0.0 - 0.7 10e3/uL    Absolute Basophils 0.3 (H) 0.0 - 0.2 10e3/uL    Absolute Immature Granulocytes 0.5 (H) <=0.4 10e3/uL    Absolute NRBCs 0.7 10e3/uL   CBC with platelets differential     Status: Abnormal    Narrative    The following orders were created for panel order CBC with platelets differential.  Procedure                               Abnormality         Status                     ---------                               -----------         ------                     CBC with platelets and d...[[id number]]  Abnormal            Final result                 Please view results for these tests on the individual orders.     Medications   sodium chloride 0.9% BOLUS 1,000 mL (0 mLs Intravenous Stopped 10/27/23 0926)   sodium chloride 0.9% BOLUS 1,000 mL (0 mLs Intravenous Stopped 10/27/23 1227)   HYDROmorphone (DILAUDID) injection 1 mg (1 mg Intravenous $Given 10/27/23 1443)   ketorolac (TORADOL) injection 30 mg (30 mg Intravenous $Given 10/27/23 0930)     Labs Ordered and Resulted from Time of ED Arrival to Time of ED Departure   CBC WITH PLATELETS AND DIFFERENTIAL - Abnormal       Result Value    WBC Count 14.1 (*)     RBC Count 2.32 (*)     Hemoglobin 7.1 (*)     Hematocrit 20.8 (*)     MCV 90      MCH 30.6      MCHC 34.1      RDW 28.0 (*)     Platelet Count 398      % Neutrophils 70      % Lymphocytes 15      % Monocytes 6      % Eosinophils 3      % Basophils 2      % Immature Granulocytes 4      NRBCs per 100 WBC 5 (*)     Absolute Neutrophils 9.8 (*)     Absolute Lymphocytes 2.1      Absolute Monocytes 0.9      Absolute Eosinophils 0.5      Absolute Basophils 0.3 (*)     Absolute Immature Granulocytes  0.5 (*)     Absolute NRBCs 0.7     BASIC METABOLIC PANEL - Normal    Sodium 140      Potassium 4.2      Chloride 107      Carbon Dioxide (CO2) 22      Anion Gap 11      Urea Nitrogen 11.7      Creatinine 0.75      GFR Estimate >90      Calcium 8.9      Glucose 91       No orders to display          Assessment & Plan    24yo F with sickle cell disease and recurrent acute pain crises with similar presentation including most recent episode last week. Current presentation appears consistent with prior flares. Reports adherence to outpatient SCD management regimen. No acute distress or findings concerning for acute chest syndrome or asthma exacerbation. Vitals stable on presentation. No major findings on exam and patient states that midline lumbar pain is not significantly worsened by palpation. Basic labs obtained and established ED pain plan initiated. CBC with Hgb 7.1 (six days ago 7.4). Also leukocytosis to 14.1 (neutrophil-predominant) though similar 6 days ago (13.6) and without fever or infectious symptoms. BMP wnl. Holding off on further work-up given low suspicion for infection.     After receiving her established care plan with 3x doses of dilaudid, reports significant pain improvement and would like to be discharged. Will discharge with instructions to follow-up with her hematology clinic. Patient verbalizes understanding.    I have reviewed the nursing notes. I have reviewed the findings, diagnosis, plan and need for follow up with the patient.     Discharge Medication List as of 10/27/2023  2:22 PM        START taking these medications    Details   oxyCODONE IR (ROXICODONE) 15 MG tablet Take 1 tablet (15 mg) by mouth every 4 hours as needed for severe pain or breakthrough pain Goal 4 per day. Max 6 per day., Disp-25 tablet, R-0, E-Prescribe             Final diagnoses:   Sickle cell pain crisis (H)       Mykel Luz      [facility] EMERGENCY DEPARTMENT  10/27/2023    ED Attending  Physician Attestation    I Mykel Luz DO, cared for this patient with the medical student. I have performed a history and physical examination of the patient and discussed management with the resident. I reviewed the medical student's documentation above and agree with the documented findings and plan of care.    Summary of HPI, PE, ED Course   Patient evaluated in the emergency department for sickle cell pain crisis.  Pain was treated per her usual protocol.  Hemoglobin at baseline patient was ultimately discharged.    Critical Care & Procedures  Not applicable.        Medical Decision Making  The patient's presentation is strongly suggestive of high complexity (a chronic illness severe exacerbation, progression, or side effect of treatment).    The patient's evaluation involved:  review of external note(s) from 3+ sources (see separate area of note for details)  ordering and/or review of 3+ test(s) in this encounter (see separate area of note for details)  review of 3+ test result(s) ordered prior to this encounter (see separate area of note for details)    The patient's management involved high risk (a parenteral controlled substance).      Mykel Luz DO  Emergency Medicine        Mykel Luz DO  10/30/23 1148

## 2023-10-30 ENCOUNTER — PATIENT OUTREACH (OUTPATIENT)
Dept: CARE COORDINATION | Facility: CLINIC | Age: 23
End: 2023-10-30
Payer: COMMERCIAL

## 2023-10-30 ENCOUNTER — INFUSION THERAPY VISIT (OUTPATIENT)
Dept: TRANSPLANT | Facility: CLINIC | Age: 23
End: 2023-10-30
Attending: PEDIATRICS
Payer: COMMERCIAL

## 2023-10-30 ENCOUNTER — NURSE TRIAGE (OUTPATIENT)
Dept: ONCOLOGY | Facility: CLINIC | Age: 23
End: 2023-10-30
Payer: COMMERCIAL

## 2023-10-30 ENCOUNTER — VIRTUAL VISIT (OUTPATIENT)
Dept: PHARMACY | Facility: CLINIC | Age: 23
End: 2023-10-30
Payer: COMMERCIAL

## 2023-10-30 VITALS
SYSTOLIC BLOOD PRESSURE: 140 MMHG | HEART RATE: 65 BPM | OXYGEN SATURATION: 95 % | DIASTOLIC BLOOD PRESSURE: 90 MMHG | TEMPERATURE: 97.6 F | RESPIRATION RATE: 16 BRPM

## 2023-10-30 DIAGNOSIS — D57.00 SICKLE CELL PAIN CRISIS (H): ICD-10-CM

## 2023-10-30 DIAGNOSIS — E83.111 IRON OVERLOAD DUE TO REPEATED RED BLOOD CELL TRANSFUSIONS: ICD-10-CM

## 2023-10-30 DIAGNOSIS — J30.2 SEASONAL ALLERGIC RHINITIS, UNSPECIFIED TRIGGER: ICD-10-CM

## 2023-10-30 DIAGNOSIS — D57.00 SICKLE CELL PAIN CRISIS (H): Primary | ICD-10-CM

## 2023-10-30 DIAGNOSIS — T78.40XS ALLERGIC REACTION, SEQUELA: ICD-10-CM

## 2023-10-30 DIAGNOSIS — R11.0 NAUSEA: ICD-10-CM

## 2023-10-30 DIAGNOSIS — G81.10 SPASTIC HEMIPLEGIA, UNSPECIFIED ETIOLOGY, UNSPECIFIED LATERALITY (H): ICD-10-CM

## 2023-10-30 DIAGNOSIS — I82.609: ICD-10-CM

## 2023-10-30 DIAGNOSIS — J45.909 UNCOMPLICATED ASTHMA, UNSPECIFIED ASTHMA SEVERITY, UNSPECIFIED WHETHER PERSISTENT: Primary | ICD-10-CM

## 2023-10-30 PROCEDURE — 99605 MTMS BY PHARM NP 15 MIN: CPT | Performed by: PHARMACIST

## 2023-10-30 PROCEDURE — 96374 THER/PROPH/DIAG INJ IV PUSH: CPT

## 2023-10-30 PROCEDURE — 96375 TX/PRO/DX INJ NEW DRUG ADDON: CPT

## 2023-10-30 PROCEDURE — 96376 TX/PRO/DX INJ SAME DRUG ADON: CPT

## 2023-10-30 PROCEDURE — 96361 HYDRATE IV INFUSION ADD-ON: CPT

## 2023-10-30 PROCEDURE — 258N000003 HC RX IP 258 OP 636: Performed by: PEDIATRICS

## 2023-10-30 PROCEDURE — 250N000011 HC RX IP 250 OP 636: Mod: JZ | Performed by: PEDIATRICS

## 2023-10-30 RX ORDER — HEPARIN SODIUM (PORCINE) LOCK FLUSH IV SOLN 100 UNIT/ML 100 UNIT/ML
5 SOLUTION INTRAVENOUS
Status: DISCONTINUED | OUTPATIENT
Start: 2023-10-30 | End: 2023-10-30 | Stop reason: HOSPADM

## 2023-10-30 RX ORDER — HEPARIN SODIUM (PORCINE) LOCK FLUSH IV SOLN 100 UNIT/ML 100 UNIT/ML
5 SOLUTION INTRAVENOUS
Status: CANCELLED | OUTPATIENT
Start: 2024-01-01

## 2023-10-30 RX ORDER — ONDANSETRON 4 MG/1
8 TABLET, FILM COATED ORAL
Status: CANCELLED
Start: 2024-01-01

## 2023-10-30 RX ORDER — HEPARIN SODIUM,PORCINE 10 UNIT/ML
5 VIAL (ML) INTRAVENOUS
Status: CANCELLED | OUTPATIENT
Start: 2024-01-01

## 2023-10-30 RX ORDER — DIPHENHYDRAMINE HCL 25 MG
25 CAPSULE ORAL
Status: CANCELLED
Start: 2024-01-01

## 2023-10-30 RX ORDER — KETOROLAC TROMETHAMINE 30 MG/ML
30 INJECTION, SOLUTION INTRAMUSCULAR; INTRAVENOUS ONCE
Status: CANCELLED
Start: 2024-01-01 | End: 2024-01-01

## 2023-10-30 RX ORDER — KETOROLAC TROMETHAMINE 30 MG/ML
30 INJECTION, SOLUTION INTRAMUSCULAR; INTRAVENOUS ONCE
Status: COMPLETED | OUTPATIENT
Start: 2023-10-30 | End: 2023-10-30

## 2023-10-30 RX ADMIN — HYDROMORPHONE HYDROCHLORIDE 1 MG: 1 INJECTION, SOLUTION INTRAMUSCULAR; INTRAVENOUS; SUBCUTANEOUS at 14:00

## 2023-10-30 RX ADMIN — SODIUM CHLORIDE, POTASSIUM CHLORIDE, SODIUM LACTATE AND CALCIUM CHLORIDE 1000 ML: 600; 310; 30; 20 INJECTION, SOLUTION INTRAVENOUS at 11:35

## 2023-10-30 RX ADMIN — Medication 5 ML: at 15:02

## 2023-10-30 RX ADMIN — HYDROMORPHONE HYDROCHLORIDE 1 MG: 1 INJECTION, SOLUTION INTRAMUSCULAR; INTRAVENOUS; SUBCUTANEOUS at 11:44

## 2023-10-30 RX ADMIN — HYDROMORPHONE HYDROCHLORIDE 1 MG: 1 INJECTION, SOLUTION INTRAMUSCULAR; INTRAVENOUS; SUBCUTANEOUS at 12:47

## 2023-10-30 RX ADMIN — KETOROLAC TROMETHAMINE 30 MG: 30 INJECTION, SOLUTION INTRAMUSCULAR; INTRAVENOUS at 11:47

## 2023-10-30 ASSESSMENT — PAIN SCALES - GENERAL
PAINLEVEL: MILD PAIN (3)
PAINLEVEL: EXTREME PAIN (9)

## 2023-10-30 NOTE — PROGRESS NOTES
Infusion Nursing Note:  Jennifer Cervantes presents today for IVF and pain meds.    Patient seen by provider today: No   present during visit today: Not Applicable.    Note: No labs drawn. Pt received 1L LR bolus over 2 hours, x1 dose of Toradol, and x3 doses of Dilaudid. Pt admitted to infusion clinic rating pain 9/10 in lower back and discharged with pain at a 3/10. All VSS, BP slightly elevated but WDL. Pt discharged with no further clinical concerns.       Intravenous Access:  Implanted Port.    Treatment Conditions:  Not applicable.       Post Infusion Assessment:  Patient tolerated infusion without incident.  Blood return noted pre and post infusion.       Discharge Plan:   Patient and/or family verbalized understanding of discharge instructions and all questions answered.  Patient discharged in stable condition.    Radha Galindo RN

## 2023-10-30 NOTE — PROGRESS NOTES
Medication Therapy Management (MTM) Encounter    ASSESSMENT:                            Medication Adherence/Access: No issues identified    Asthma: stable - no medication changes/recommendations needed at this time     Sickle Cell Anemia/Sickle Cell Pain Crisis: stable - no medication changes/recommendations needed at this time     Blood Clots: stable - no medication changes/recommendations needed at this time     Iron Overload: stable - no medication changes/recommendations needed at this time     Nausea/Vomiting: stable - no medication changes/recommendations needed at this time     Seasonal Allergies: stable - no medication changes/recommendations needed at this time     Allergic Reactions: stable - no medication changes/recommendations needed at this time     PLAN:                            Continue taking medications as prescribed    Follow-up: Return if symptoms worsen or fail to improve.    SUBJECTIVE/OBJECTIVE:                          Jennifer Cervantes is a 23 year old female called for an initial visit. She was referred to me from patient's insurance.      Reason for visit: Medication Review.    Allergies/ADRs: Reviewed in chart  Past Medical History: Reviewed in chart  Tobacco: She reports that she has never smoked. She has never been exposed to tobacco smoke. She has never used smokeless tobacco.  Alcohol: none    Medication Adherence/Access: no issues reported    Asthma:   -ICS/LABA - Symbicort 160/4.5 mcg - 2 puffs two times a day   -Albuterol (ProAir/Ventolin/Proventil) - has not been needing to use it  -Albuterol Nebs as needed - has not been needing to use it  -Prednisone 20 mg tablet - has not needed it but has 3 tablets on hand if she does need it. Her provider explained that they would not like her to use it if she does not need it and she understands that.   For opioid reversal, the patient does have narcan 4 mg nasal spray but she has not had to take it since she has gotten it - needs a refill soon  due to the medication expiring     Patient reports no current medication side effects.      Triggers include: strong odors and fumes and exercise or sports.  Patient reports the following symptoms: none.    Sickle Cell Anemia/Sickle Cell Pain Crisis:  -Hydroxyurea 3000 mg daily    -Methocarbamol 750 mg four times a day as needed for muscle spasms   -Oxycodone IR 15 mg every 4 hours as needed for muscle spasms this works for her and has not needed the oxycodone   -Acetaminophen 650 mg every 6 hours as needed for pain - rarely uses  -Ibuprofen daily 200 mg     Blood Clots:  -Aspirin 81 mg two times a day     No issues reported or questions asked at this time     Iron Overload:  -Deferasirox (Jadenu) 1440 mg daily     No issues reported or questions asked at this time     Nausea/Vomiting:  -Ondansetron 8 mg every 8 hours as needed     Seasonal Allergies:  -Cetirizine 10 mg daily as needed  -Benadryl 25 mg every 6 hours as needed - also helps with sleep, uses sparingly     No issues reported or questions asked at this time     Allergic Reactions:  -Epipen for allergic reactions     No issues reported or questions asked at this time       Today's Vitals: There were no vitals taken for this visit.  ----------------      I spent 20 minutes with this patient today. All changes were made via collaborative practice agreement with Suraj Case MD. A copy of the visit note was provided to the patient's provider(s).    A summary of these recommendations was sent via [employer].    Chapito Fischer, PharmD Student, on 10/30/2023 at 11:04 AM      Telemedicine Visit Details  Type of service:  Telephone visit  Start Time:  10:33 AM  End Time:  10:55 AM       Medication Therapy Recommendations  No medication therapy recommendations to display

## 2023-10-30 NOTE — PROGRESS NOTES
Social Work - Transportation  [facility]    Data/Intervention:  Patient Name: Jennifer Cervantes Goes By: Jennifer    /Age: 2000 (23 year old)  [facility]  Reason for Referral:  support requested for patient transportation needs for today's appointment.    Assessment:  called [employer] to arrange ride through patient's insurance. [employer] arranged  for patient from home with [employer]. Patient will need to call [phone] when ready for return ride home.  Plan: Patient is aware of the transportation plan.  available to assist with any other needs.  CARLOS Chavez,SW  Hematology/Oncology Social Worker  Phone:[phone] Pager: [phone]

## 2023-10-30 NOTE — TELEPHONE ENCOUNTER
BMT has noon availability   Call placed to jennifer and she is ok with noon appt.  Updated infusion nurses that Jennifer can take noon appointment.    Message sent to [facility]   Message sent to social work to arrange transportation.

## 2023-10-30 NOTE — TELEPHONE ENCOUNTER
Oncology Nurse Triage - Sickle Cell Pain Crisis:    Situation: Jennifer  calling about Sickle Cell Pain Crisis, requesting to be added on for IV fluids and pain medicine    Background:     Patient's last infusion was 10/25/23 was in ER on 10/27/23   Last clinic visit date:10/2/23 DR Duncan   Does patient have active treatment plan?  Yes      Assessment of Symptoms:  Onset/Duration of symptoms: 1 day    Is it typical sickle cell pain? Yes   Location: lower back   Character: Sharp           Intensity: 8/10    Any radiation of pain, numbness, tingling, weakness, warmth, swelling, discoloration of arms or legs?No     Fever?No  (if yes max temperature recorded in last 24 hours):      Chest Pain Present: No     Shortness of breath: No     Other home therapies tried: HEAT/HEATING PAD and WARM BATH     Last home medication taken and when: 6:00am oxycodone, ibuprofen and muscle relaxer     Any Refills Needed?: No     Does patient have transportation & length of time to get to clinic: No patient needs transportation to clinic         Recommendations:     Placed on infusion call list     If you do not hear from the infusion center by 2pm then you will not be able to get in for an infusion today. If symptoms worsen while waiting for call back, and/or you experience fever, chills, SOB, chest pain, cough, n/v, dizziness, numbness, swelling, discoloration of extremities, then seek emergency evaluation in Emergency Department.     Please note, if you are late for your appt, you risk losing your infusion appt as it may delay another patient's infusion who arrived on time.

## 2023-11-01 ENCOUNTER — HOSPITAL ENCOUNTER (EMERGENCY)
Facility: CLINIC | Age: 23
Discharge: HOME | End: 2023-11-02
Attending: FAMILY MEDICINE | Admitting: FAMILY MEDICINE
Payer: COMMERCIAL

## 2023-11-01 ENCOUNTER — APPOINTMENT (OUTPATIENT)
Dept: GENERAL RADIOLOGY | Facility: CLINIC | Age: 23
End: 2023-11-01
Attending: FAMILY MEDICINE
Payer: COMMERCIAL

## 2023-11-01 DIAGNOSIS — D57.00 SICKLE CELL PAIN CRISIS (H): ICD-10-CM

## 2023-11-01 LAB
ALBUMIN SERPL BCG-MCNC: 4.2 G/DL (ref 3.5–5.2)
ALBUMIN SERPL BCG-MCNC: NORMAL G/DL
ALP SERPL-CCNC: 59 U/L (ref 35–104)
ALP SERPL-CCNC: NORMAL U/L
ALT SERPL W P-5'-P-CCNC: 26 U/L (ref 0–50)
ALT SERPL W P-5'-P-CCNC: NORMAL U/L
ANION GAP SERPL CALCULATED.3IONS-SCNC: 7 MMOL/L (ref 7–15)
ANION GAP SERPL CALCULATED.3IONS-SCNC: NORMAL MMOL/L
AST SERPL W P-5'-P-CCNC: 49 U/L (ref 0–45)
AST SERPL W P-5'-P-CCNC: NORMAL U/L
BASOPHILS # BLD AUTO: 0.2 10E3/UL (ref 0–0.2)
BASOPHILS # BLD AUTO: NORMAL 10*3/UL
BASOPHILS NFR BLD AUTO: 1 %
BASOPHILS NFR BLD AUTO: NORMAL %
BILIRUB SERPL-MCNC: 3.6 MG/DL
BILIRUB SERPL-MCNC: NORMAL MG/DL
BUN SERPL-MCNC: 9.1 MG/DL (ref 6–20)
BUN SERPL-MCNC: NORMAL MG/DL
CALCIUM SERPL-MCNC: 8.1 MG/DL (ref 8.6–10)
CALCIUM SERPL-MCNC: NORMAL MG/DL
CHLORIDE SERPL-SCNC: 106 MMOL/L (ref 98–107)
CHLORIDE SERPL-SCNC: NORMAL MMOL/L
CREAT SERPL-MCNC: 0.55 MG/DL (ref 0.51–0.95)
CREAT SERPL-MCNC: NORMAL MG/DL
DEPRECATED HCO3 PLAS-SCNC: 24 MMOL/L (ref 22–29)
DEPRECATED HCO3 PLAS-SCNC: NORMAL MMOL/L
EGFRCR SERPLBLD CKD-EPI 2021: >90 ML/MIN/1.73M2
EGFRCR SERPLBLD CKD-EPI 2021: NORMAL ML/MIN/{1.73_M2}
EOSINOPHIL # BLD AUTO: 0.3 10E3/UL (ref 0–0.7)
EOSINOPHIL # BLD AUTO: NORMAL 10*3/UL
EOSINOPHIL NFR BLD AUTO: 2 %
EOSINOPHIL NFR BLD AUTO: NORMAL %
ERYTHROCYTE [DISTWIDTH] IN BLOOD BY AUTOMATED COUNT: 24.4 % (ref 10–15)
ERYTHROCYTE [DISTWIDTH] IN BLOOD BY AUTOMATED COUNT: NORMAL %
GLUCOSE SERPL-MCNC: 95 MG/DL (ref 70–99)
GLUCOSE SERPL-MCNC: NORMAL MG/DL
HCG UR QL: NEGATIVE
HCT VFR BLD AUTO: 19.6 % (ref 35–47)
HCT VFR BLD AUTO: NORMAL %
HGB BLD-MCNC: 7 G/DL (ref 11.7–15.7)
HGB BLD-MCNC: NORMAL G/DL
IMM GRANULOCYTES # BLD: 0.1 10E3/UL
IMM GRANULOCYTES # BLD: NORMAL 10*3/UL
IMM GRANULOCYTES NFR BLD: 0 %
IMM GRANULOCYTES NFR BLD: NORMAL %
INTERNAL QC OK POCT: NORMAL
LYMPHOCYTES # BLD AUTO: 2.5 10E3/UL (ref 0.8–5.3)
LYMPHOCYTES # BLD AUTO: NORMAL 10*3/UL
LYMPHOCYTES NFR BLD AUTO: 17 %
LYMPHOCYTES NFR BLD AUTO: NORMAL %
MCH RBC QN AUTO: 32.3 PG (ref 26.5–33)
MCH RBC QN AUTO: NORMAL PG
MCHC RBC AUTO-ENTMCNC: 35.7 G/DL (ref 31.5–36.5)
MCHC RBC AUTO-ENTMCNC: NORMAL G/DL
MCV RBC AUTO: 90 FL (ref 78–100)
MCV RBC AUTO: NORMAL FL
MONOCYTES # BLD AUTO: 1 10E3/UL (ref 0–1.3)
MONOCYTES # BLD AUTO: NORMAL 10*3/UL
MONOCYTES NFR BLD AUTO: 7 %
MONOCYTES NFR BLD AUTO: NORMAL %
NEUTROPHILS # BLD AUTO: 10.6 10E3/UL (ref 1.6–8.3)
NEUTROPHILS # BLD AUTO: NORMAL 10*3/UL
NEUTROPHILS NFR BLD AUTO: 73 %
NEUTROPHILS NFR BLD AUTO: NORMAL %
NRBC # BLD AUTO: 0.3 10E3/UL
NRBC BLD AUTO-RTO: 2 /100
PLATELET # BLD AUTO: 297 10E3/UL (ref 150–450)
PLATELET # BLD AUTO: NORMAL 10*3/UL
POCT KIT EXPIRATION DATE: NORMAL
POCT KIT LOT NUMBER: NORMAL
POTASSIUM SERPL-SCNC: 3.5 MMOL/L (ref 3.4–5.3)
POTASSIUM SERPL-SCNC: NORMAL MMOL/L
PROT SERPL-MCNC: 6.7 G/DL (ref 6.4–8.3)
PROT SERPL-MCNC: NORMAL G/DL
RBC # BLD AUTO: 2.17 10E6/UL (ref 3.8–5.2)
RBC # BLD AUTO: NORMAL 10*6/UL
RETICS # AUTO: 0.34 10E6/UL (ref 0.03–0.1)
RETICS/RBC NFR AUTO: 21.7 % (ref 0.5–2)
SODIUM SERPL-SCNC: 137 MMOL/L (ref 135–145)
SODIUM SERPL-SCNC: NORMAL MMOL/L
WBC # BLD AUTO: 14.6 10E3/UL (ref 4–11)
WBC # BLD AUTO: NORMAL 10*3/UL

## 2023-11-01 PROCEDURE — 96376 TX/PRO/DX INJ SAME DRUG ADON: CPT

## 2023-11-01 PROCEDURE — 96361 HYDRATE IV INFUSION ADD-ON: CPT

## 2023-11-01 PROCEDURE — 84155 ASSAY OF PROTEIN SERUM: CPT | Performed by: FAMILY MEDICINE

## 2023-11-01 PROCEDURE — 258N000003 HC RX IP 258 OP 636: Performed by: FAMILY MEDICINE

## 2023-11-01 PROCEDURE — 96374 THER/PROPH/DIAG INJ IV PUSH: CPT

## 2023-11-01 PROCEDURE — 250N000011 HC RX IP 250 OP 636: Mod: JZ | Performed by: FAMILY MEDICINE

## 2023-11-01 PROCEDURE — 85045 AUTOMATED RETICULOCYTE COUNT: CPT | Performed by: FAMILY MEDICINE

## 2023-11-01 PROCEDURE — 84450 TRANSFERASE (AST) (SGOT): CPT | Performed by: FAMILY MEDICINE

## 2023-11-01 PROCEDURE — 71046 X-RAY EXAM CHEST 2 VIEWS: CPT

## 2023-11-01 PROCEDURE — 36415 COLL VENOUS BLD VENIPUNCTURE: CPT | Performed by: FAMILY MEDICINE

## 2023-11-01 PROCEDURE — 96375 TX/PRO/DX INJ NEW DRUG ADDON: CPT

## 2023-11-01 PROCEDURE — 99285 EMERGENCY DEPT VISIT HI MDM: CPT | Mod: 25

## 2023-11-01 PROCEDURE — 85025 COMPLETE CBC W/AUTO DIFF WBC: CPT | Performed by: FAMILY MEDICINE

## 2023-11-01 PROCEDURE — 81025 URINE PREGNANCY TEST: CPT | Performed by: FAMILY MEDICINE

## 2023-11-01 PROCEDURE — 99284 EMERGENCY DEPT VISIT MOD MDM: CPT | Performed by: FAMILY MEDICINE

## 2023-11-01 PROCEDURE — 93005 ELECTROCARDIOGRAM TRACING: CPT | Mod: RTG

## 2023-11-01 RX ORDER — KETOROLAC TROMETHAMINE 30 MG/ML
30 INJECTION, SOLUTION INTRAMUSCULAR; INTRAVENOUS ONCE
Status: COMPLETED | OUTPATIENT
Start: 2023-11-01 | End: 2023-11-01

## 2023-11-01 RX ADMIN — HYDROMORPHONE HYDROCHLORIDE 1 MG: 1 INJECTION, SOLUTION INTRAMUSCULAR; INTRAVENOUS; SUBCUTANEOUS at 21:02

## 2023-11-01 RX ADMIN — KETOROLAC TROMETHAMINE 30 MG: 30 INJECTION, SOLUTION INTRAMUSCULAR; INTRAVENOUS at 19:39

## 2023-11-01 RX ADMIN — SODIUM CHLORIDE 2000 ML: 9 INJECTION, SOLUTION INTRAVENOUS at 19:39

## 2023-11-01 RX ADMIN — HYDROMORPHONE HYDROCHLORIDE 1 MG: 1 INJECTION, SOLUTION INTRAMUSCULAR; INTRAVENOUS; SUBCUTANEOUS at 22:51

## 2023-11-01 RX ADMIN — HYDROMORPHONE HYDROCHLORIDE 1 MG: 1 INJECTION, SOLUTION INTRAMUSCULAR; INTRAVENOUS; SUBCUTANEOUS at 19:39

## 2023-11-01 ASSESSMENT — ACTIVITIES OF DAILY LIVING (ADL)
ADLS_ACUITY_SCORE: 35
ADLS_ACUITY_SCORE: 33
ADLS_ACUITY_SCORE: 35

## 2023-11-01 ASSESSMENT — ENCOUNTER SYMPTOMS: SICKLE CELL PAIN: 1

## 2023-11-01 NOTE — ED PROVIDER NOTES
ED Provider Note  [facility]      History     Chief Complaint   Patient presents with    Sickle Cell Pain Crisis     9/10 pain in back       Sickle Cell Pain Crisis    Jennifer Cervantes is a 23 year old female with a history of sickle cell disease, recurrent sick cell acute pain crises, prior acute chest syndrome, R-sided hemiplegia 2/2 cerebral infarct (2015), chronic recurrent PE, and moderate asthma who presents to the ED today with c/o similar back pain to previous sickle crises. She reports it hurts everywhere. All over back, into leg. Last crisis was 10/27/31. She reports dilaudid and toradol usually help with pain. Remote hx of stroke when she was 1. No symptoms besides chronic pain. Last saw hematologist about a month ago.     Past Medical History  Past Medical History:   Diagnosis Date    Anxiety     Bleeding disorder (H24)     Blood clotting disorder (H24)     Cerebral infarction (H) 2015    Cognitive developmental delay     low IQ. 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 side (H)     right hand contractures    Iron overload due to repeated red blood cell transfusions     Migraines     Multiple subsegmental pulmonary emboli without acute cor pulmonale (H) 02/01/2021    Oppositional defiant behavior     Presence of intrauterine contraceptive device 2/18/2020    Superficial venous thrombosis of arm, right 03/25/2021    Uncomplicated asthma      Past Surgical History:   Procedure Laterality Date    AS INSERT TUNNELED CV 2 CATH W/O PORT/PUMP      CHOLECYSTECTOMY      CV RIGHT HEART CATH MEASUREMENTS RECORDED N/A 11/18/2021    Procedure: Right Heart Cath;  Surgeon: Jackson Stauffer MD;  Location:  HEART CARDIAC CATH LAB    INSERT PORT VASCULAR ACCESS Left 4/21/2021    Procedure: INSERTION, VASCULAR ACCESS PORT (NOT SURE ON SIDE UNTIL REMOVAL);  Surgeon:  Rajan More MD;  Location: [facility] OR    IR CHEST PORT PLACEMENT > 5 YRS OF AGE  4/21/2021    IR CVC NON TUNNEL LINE REMOVAL  5/6/2021    IR CVC NON TUNNEL PLACEMENT > 5 YRS  04/07/2020    IR CVC NON TUNNEL PLACEMENT > 5 YRS  4/30/2021    IR CVC NON TUNNEL PLACEMENT > 5 YRS  9/7/2022    IR PORT REMOVAL LEFT  4/21/2021    REMOVE PORT VASCULAR ACCESS Left 4/21/2021    Procedure: REMOVAL, VASCULAR ACCESS PORT LEFT;  Surgeon: Rajan More MD;  Location: [facility] OR    REPAIR TENDON ELBOW Right 10/02/2019    Procedure: Right Elbow Flexor Lengthening, Flexor Pronator Slide Of Wrist and Finger, Thumb Adductor Lengthening;  Surgeon: Anai Franco MD;  Location: [facility] OR    TONSILLECTOMY Bilateral 10/02/2019    Procedure: Bilateral Tonsillectomy;  Surgeon: Farhana Guy MD;  Location: [facility] OR    ZZC BREAST REDUCTION (INCLUDES LIPO) TIER 3 Bilateral 04/23/2019     acetaminophen (TYLENOL) 325 MG tablet  albuterol (PROAIR HFA/PROVENTIL HFA/VENTOLIN HFA) 108 (90 Base) MCG/ACT inhaler  albuterol (PROVENTIL) (2.5 MG/3ML) 0.083% neb solution  aspirin (ASA) 81 MG chewable tablet  budesonide-formoterol (SYMBICORT) 160-4.5 MCG/ACT Inhaler  deferasirox (JADENU) 360 MG tablet  diphenhydrAMINE (BENADRYL) 25 MG capsule  Hydroxyurea 1000 MG TABS  methocarbamol (ROBAXIN) 750 MG tablet  oxyCODONE IR (ROXICODONE) 15 MG tablet  cetirizine (ZYRTEC) 10 MG tablet  EPINEPHrine (ANY BX GENERIC EQUIV) 0.3 MG/0.3ML injection 2-pack  naloxone (NARCAN) 4 MG/0.1ML nasal spray  ondansetron (ZOFRAN) 8 MG tablet  predniSONE (DELTASONE) 20 MG tablet      Allergies   Allergen Reactions    Contrast Dye      Hives and breathing issues    Fish-Derived Products Hives    Seafood Hives    Gadolinium     Iodinated Contrast Media      Family History  Family History   Problem Relation Age of Onset    Sickle Cell Trait Mother     Hypertension Mother     Asthma Mother     Sickle Cell Trait Father     Glaucoma No family hx of     Macular Degeneration No  family hx of     Diabetes No family hx of     Gout No family hx of      Social History   Social History     Tobacco Use    Smoking status: Never     Passive exposure: Never    Smokeless tobacco: Never   Substance Use Topics    Alcohol use: Not Currently     Alcohol/week: 0.0 standard drinks of alcohol    Drug use: Never      Past medical history, past surgical history, medications, allergies, family history, and social history were reviewed with the patient. No additional pertinent items.      A complete review of systems was performed with pertinent positives and negatives noted in the HPI, and all other systems negative.    Physical Exam   BP: 136/81  Pulse: 112  Temp: 98.1  F (36.7  C)  Resp: 16  Weight: 65.9 kg (145 lb 4.8 oz)  SpO2: 96 %  Physical Exam  Constitutional:       General: She is not in acute distress.     Appearance: Normal appearance. She is not diaphoretic.   HENT:      Head: Atraumatic.      Mouth/Throat:      Mouth: Mucous membranes are moist.   Eyes:      General: No scleral icterus.     Conjunctiva/sclera: Conjunctivae normal.   Cardiovascular:      Rate and Rhythm: Normal rate.      Heart sounds: Normal heart sounds.   Pulmonary:      Effort: No respiratory distress.      Breath sounds: Normal breath sounds.   Chest:      Chest wall: Tenderness present.   Abdominal:      General: Abdomen is flat.      Tenderness: There is no abdominal tenderness. There is no guarding or rebound.   Musculoskeletal:      Cervical back: Neck supple.   Skin:     General: Skin is warm.      Findings: No rash.   Neurological:      General: No focal deficit present.      Mental Status: She is alert and oriented to person, place, and time.      Sensory: No sensory deficit.      Motor: No weakness.      Coordination: Coordination normal.           ED Course, Procedures, & Data      Procedures       Results for orders placed or performed during the hospital encounter of 11/01/23   XR Chest 2 Views     Status: None     "Narrative    EXAM: XR CHEST 2 VIEWS  LOCATION: [facility]  DATE: 11/1/2023    INDICATION: sickle crisis  COMPARISON: 09/23/2023      Impression    IMPRESSION: Heart size within normal limits. Left chest port catheter tip terminates near cavoatrial junction. No focal airspace consolidation. No visible pneumothorax or pleural effusion. Right upper quadrant surgical clips.   Comprehensive metabolic panel     Status: None   Result Value Ref Range    Sodium      Potassium      Carbon Dioxide (CO2)      Anion Gap      Urea Nitrogen      Creatinine      GFR Estimate      Calcium      Chloride      Glucose      Alkaline Phosphatase      AST      ALT      Protein Total      Albumin      Bilirubin Total     Reticulocyte count     Status: Abnormal   Result Value Ref Range    % Reticulocyte 21.7 (H) 0.5 - 2.0 %    Absolute Reticulocyte 0.338 (H) 0.025 - 0.095 10e6/uL   CBC with platelets and differential     Status: None   Result Value Ref Range    WBC Count      RBC Count      Hemoglobin      Hematocrit      MCV      MCH      MCHC      RDW      Platelet Count      % Neutrophils      % Lymphocytes      % Monocytes      % Eosinophils      % Basophils      % Immature Granulocytes      Absolute Neutrophils      Absolute Lymphocytes      Absolute Monocytes      Absolute Eosinophils      Absolute Basophils      Absolute Immature Granulocytes      Narrative    Previously reported component [ NRBCs ] is no longer reported.\"  Previously reported component [ NRBCs Absolute ] is no longer reported.\"   Comprehensive metabolic panel     Status: Abnormal   Result Value Ref Range    Sodium 137 135 - 145 mmol/L    Potassium 3.5 3.4 - 5.3 mmol/L    Carbon Dioxide (CO2) 24 22 - 29 mmol/L    Anion Gap 7 7 - 15 mmol/L    Urea Nitrogen 9.1 6.0 - 20.0 mg/dL    Creatinine 0.55 0.51 - 0.95 mg/dL    GFR Estimate >90 >60 mL/min/1.73m2    Calcium 8.1 (L) 8.6 - 10.0 mg/dL    Chloride 106 98 - 107 mmol/L    " Glucose 95 70 - 99 mg/dL    Alkaline Phosphatase 59 35 - 104 U/L    AST 49 (H) 0 - 45 U/L    ALT 26 0 - 50 U/L    Protein Total 6.7 6.4 - 8.3 g/dL    Albumin 4.2 3.5 - 5.2 g/dL    Bilirubin Total 3.6 (H) <=1.2 mg/dL   CBC with platelets and differential     Status: Abnormal   Result Value Ref Range    WBC Count 14.6 (H) 4.0 - 11.0 10e3/uL    RBC Count 2.17 (L) 3.80 - 5.20 10e6/uL    Hemoglobin 7.0 (L) 11.7 - 15.7 g/dL    Hematocrit 19.6 (L) 35.0 - 47.0 %    MCV 90 78 - 100 fL    MCH 32.3 26.5 - 33.0 pg    MCHC 35.7 31.5 - 36.5 g/dL    RDW 24.4 (H) 10.0 - 15.0 %    Platelet Count 297 150 - 450 10e3/uL    % Neutrophils 73 %    % Lymphocytes 17 %    % Monocytes 7 %    % Eosinophils 2 %    % Basophils 1 %    % Immature Granulocytes 0 %    NRBCs per 100 WBC 2 (H) <1 /100    Absolute Neutrophils 10.6 (H) 1.6 - 8.3 10e3/uL    Absolute Lymphocytes 2.5 0.8 - 5.3 10e3/uL    Absolute Monocytes 1.0 0.0 - 1.3 10e3/uL    Absolute Eosinophils 0.3 0.0 - 0.7 10e3/uL    Absolute Basophils 0.2 0.0 - 0.2 10e3/uL    Absolute Immature Granulocytes 0.1 <=0.4 10e3/uL    Absolute NRBCs 0.3 10e3/uL   EKG 12-lead, tracing only     Status: None (Preliminary result)   Result Value Ref Range    Systolic Blood Pressure  mmHg    Diastolic Blood Pressure  mmHg    Ventricular Rate 101 BPM    Atrial Rate 101 BPM    SC Interval 142 ms    QRS Duration 76 ms     ms    QTc 482 ms    P Axis 67 degrees    R AXIS 58 degrees    T Axis -12 degrees    Interpretation ECG       Sinus tachycardia  Nonspecific T wave abnormality  Abnormal ECG     hCG qual urine POCT     Status: Normal   Result Value Ref Range    HCG Qual Urine Negative Negative    Internal QC Check POCT Valid Valid    POCT Kit Lot Number [id number]     POCT Kit Expiration Date 05/29/2025    CBC with platelets differential     Status: None    Narrative    The following orders were created for panel order CBC with platelets differential.  Procedure                               Abnormality          Status                     ---------                               -----------         ------                     CBC with platelets and d...[[id number]]                      Edited Result - FINAL        Please view results for these tests on the individual orders.   CBC with platelets differential     Status: Abnormal    Narrative    The following orders were created for panel order CBC with platelets differential.  Procedure                               Abnormality         Status                     ---------                               -----------         ------                     CBC with platelets and d...[[id number]]  Abnormal            Final result                 Please view results for these tests on the individual orders.     Medications   sodium chloride 0.9% BOLUS 2,000 mL (0 mLs Intravenous Stopped 11/1/23 2218)   ketorolac (TORADOL) injection 30 mg (30 mg Intravenous $Given 11/1/23 1939)   HYDROmorphone (DILAUDID) injection 1 mg (1 mg Intravenous $Given 11/1/23 1939)   HYDROmorphone (DILAUDID) injection 1 mg (1 mg Intravenous $Given 11/1/23 2102)   HYDROmorphone (DILAUDID) injection 1 mg (1 mg Intravenous $Given 11/1/23 2251)   heparin 100 unit/mL injection 100 Units (100 Units Intravenous $Given 11/2/23 0025)     Labs Ordered and Resulted from Time of ED Arrival to Time of ED Departure   RETICULOCYTE COUNT - Abnormal       Result Value    % Reticulocyte 21.7 (*)     Absolute Reticulocyte 0.338 (*)    COMPREHENSIVE METABOLIC PANEL - Abnormal    Sodium 137      Potassium 3.5      Carbon Dioxide (CO2) 24      Anion Gap 7      Urea Nitrogen 9.1      Creatinine 0.55      GFR Estimate >90      Calcium 8.1 (*)     Chloride 106      Glucose 95      Alkaline Phosphatase 59      AST 49 (*)     ALT 26      Protein Total 6.7      Albumin 4.2      Bilirubin Total 3.6 (*)    CBC WITH PLATELETS AND DIFFERENTIAL - Abnormal    WBC Count 14.6 (*)     RBC Count 2.17 (*)     Hemoglobin 7.0 (*)     Hematocrit 19.6 (*)      MCV 90      MCH 32.3      MCHC 35.7      RDW 24.4 (*)     Platelet Count 297      % Neutrophils 73      % Lymphocytes 17      % Monocytes 7      % Eosinophils 2      % Basophils 1      % Immature Granulocytes 0      NRBCs per 100 WBC 2 (*)     Absolute Neutrophils 10.6 (*)     Absolute Lymphocytes 2.5      Absolute Monocytes 1.0      Absolute Eosinophils 0.3      Absolute Basophils 0.2      Absolute Immature Granulocytes 0.1      Absolute NRBCs 0.3     HCG QUALITATIVE URINE POCT - Normal    HCG Qual Urine Negative      Internal QC Check POCT Valid      POCT Kit Lot Number [id number]      POCT Kit Expiration Date 05/29/2025     COMPREHENSIVE METABOLIC PANEL    Sodium        Potassium        Carbon Dioxide (CO2)        Anion Gap        Urea Nitrogen        Creatinine        GFR Estimate        Calcium        Chloride        Glucose        Alkaline Phosphatase        AST        ALT        Protein Total        Albumin        Bilirubin Total       CBC WITH PLATELETS AND DIFFERENTIAL    WBC Count        RBC Count        Hemoglobin        Hematocrit        MCV        MCH        MCHC        RDW        Platelet Count        % Neutrophils        % Lymphocytes        % Monocytes        % Eosinophils        % Basophils        % Immature Granulocytes        Absolute Neutrophils        Absolute Lymphocytes        Absolute Monocytes        Absolute Eosinophils        Absolute Basophils        Absolute Immature Granulocytes         XR Chest 2 Views   Final Result   IMPRESSION: Heart size within normal limits. Left chest port catheter tip terminates near cavoatrial junction. No focal airspace consolidation. No visible pneumothorax or pleural effusion. Right upper quadrant surgical clips.             Critical care was not performed.     Medical Decision Making  The patient's presentation was of moderate complexity (a chronic illness mild to moderate exacerbation, progression, or side effect of treatment).    The patient's evaluation  involved:  ordering and/or review of 3+ test(s) in this encounter (see separate area of note for details)    The patient's management necessitated high risk (a parenteral controlled substance) and high risk (a decision regarding hospitalization).    Assessment & Plan        I have reviewed the nursing notes. I have reviewed the findings, diagnosis, plan and need for follow up with the patient.        Final diagnoses:   Sickle cell pain crisis (H)   I, Philip Diamond, am serving as a trained medical scribe to document services personally performed by Jose Eduardo Rush MD based on the provider's statements to me on November 1, 2023.  This document has been checked and approved by the attending provider.    I, Jose Eduardo Rush MD, was physically present and have reviewed and verified the accuracy of this note documented by Philip Diamond medical scribe.      Jose Eduardo Rush MD  [facility] EMERGENCY DEPARTMENT  11/1/2023     Jose Eduardo Rush MD  11/02/23 1902

## 2023-11-02 ENCOUNTER — HOSPITAL ENCOUNTER (EMERGENCY)
Facility: CLINIC | Age: 23
End: 2023-11-02
Payer: COMMERCIAL

## 2023-11-02 VITALS
BODY MASS INDEX: 24.94 KG/M2 | WEIGHT: 145.3 LBS | HEART RATE: 89 BPM | TEMPERATURE: 98.1 F | OXYGEN SATURATION: 100 % | RESPIRATION RATE: 16 BRPM | SYSTOLIC BLOOD PRESSURE: 136 MMHG | DIASTOLIC BLOOD PRESSURE: 81 MMHG

## 2023-11-02 LAB
ATRIAL RATE - MUSE: 101 BPM
DIASTOLIC BLOOD PRESSURE - MUSE: NORMAL MMHG
INTERPRETATION ECG - MUSE: NORMAL
P AXIS - MUSE: 67 DEGREES
PR INTERVAL - MUSE: 142 MS
QRS DURATION - MUSE: 76 MS
QT - MUSE: 372 MS
QTC - MUSE: 482 MS
R AXIS - MUSE: 58 DEGREES
SYSTOLIC BLOOD PRESSURE - MUSE: NORMAL MMHG
T AXIS - MUSE: -12 DEGREES
VENTRICULAR RATE- MUSE: 101 BPM

## 2023-11-02 PROCEDURE — 250N000011 HC RX IP 250 OP 636: Performed by: FAMILY MEDICINE

## 2023-11-02 RX ORDER — HEPARIN SODIUM (PORCINE) LOCK FLUSH IV SOLN 100 UNIT/ML 100 UNIT/ML
5 SOLUTION INTRAVENOUS
Status: CANCELLED | OUTPATIENT
Start: 2023-11-03

## 2023-11-02 RX ORDER — EPINEPHRINE 1 MG/ML
0.3 INJECTION, SOLUTION INTRAMUSCULAR; SUBCUTANEOUS EVERY 5 MIN PRN
Status: CANCELLED | OUTPATIENT
Start: 2023-11-03

## 2023-11-02 RX ORDER — HEPARIN SODIUM,PORCINE 10 UNIT/ML
5 VIAL (ML) INTRAVENOUS
Status: CANCELLED | OUTPATIENT
Start: 2023-11-03

## 2023-11-02 RX ORDER — METHYLPREDNISOLONE SODIUM SUCCINATE 125 MG/2ML
125 INJECTION, POWDER, LYOPHILIZED, FOR SOLUTION INTRAMUSCULAR; INTRAVENOUS
Status: CANCELLED
Start: 2023-11-03

## 2023-11-02 RX ORDER — DIPHENHYDRAMINE HYDROCHLORIDE 50 MG/ML
50 INJECTION INTRAMUSCULAR; INTRAVENOUS
Status: CANCELLED
Start: 2023-11-03

## 2023-11-02 RX ORDER — MEPERIDINE HYDROCHLORIDE 25 MG/ML
25 INJECTION INTRAMUSCULAR; INTRAVENOUS; SUBCUTANEOUS EVERY 30 MIN PRN
Status: CANCELLED | OUTPATIENT
Start: 2023-11-03

## 2023-11-02 RX ORDER — ALBUTEROL SULFATE 90 UG/1
1-2 AEROSOL, METERED RESPIRATORY (INHALATION)
Status: CANCELLED
Start: 2023-11-03

## 2023-11-02 RX ORDER — ALBUTEROL SULFATE 0.83 MG/ML
2.5 SOLUTION RESPIRATORY (INHALATION)
Status: CANCELLED | OUTPATIENT
Start: 2023-11-03

## 2023-11-02 RX ORDER — HEPARIN SODIUM (PORCINE) LOCK FLUSH IV SOLN 100 UNIT/ML 100 UNIT/ML
100 SOLUTION INTRAVENOUS ONCE
Status: COMPLETED | OUTPATIENT
Start: 2023-11-02 | End: 2023-11-02

## 2023-11-02 RX ADMIN — SODIUM CHLORIDE, PRESERVATIVE FREE 100 UNITS: 5 INJECTION INTRAVENOUS at 00:25

## 2023-11-02 NOTE — ED NOTES
MD aware of patient being placed on O2 at 2 lpm NC for O2 sats dipping into upper 80's; RR 20 but shallow at times

## 2023-11-02 NOTE — PROGRESS NOTES
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 She had multiple thromboembolic events in 2021 despite adherent anticoagulation use (though warfarin was perpetually low) and there are concerns for chronic thromboembolic disease but did not have pulmonary HTN on a November 2021 cath. She is maintained on chronic PO opioids and twice-weekly infusion visits (since 1/24/22) but has been able to be maintained on this regimen and has stayed out of the ED most of the time with even rarer admissions.     Interval History:  Jennifer is seen for routine hematology follow-up and sidney infusion today. Since her last clinic visit with Dr. Duncan 1 month ago she has had 4 ED visits for sickle cell pain. She feels pain has increased with the onset of colder weather over the last month. She continues to work at [employer] and remains very pleased with her job and the support of management. She does admit that she is on her feet more than usual at work which contributes to her pain. Although she is provided frequent breaks and the ability to sit while working, the pain is usually significant by the time she sits down and physically rests. She continues to utilize the infusion center for supplemental IV hydration and opioids although notes that a few of the ED visits were a result of limited infusion availability. She has received an increased quantity of oxycodone with her last 3 prescription refills, presumably due to the generalized increase in pain she has  "been experiencing over the past month.     Sickle Cell Disease Comprehensive Checklist  Bone Health/Avascular Necrosis Screening/Symptoms (each visit): no new concerns today  Leg Ulcer evaluation (every visit): none  Hypertension (every visit):stable 11/3/23  Last pulmonary evaluation (asthma, AMAN, pulm HTN): 9/28/22  Stroke/silent cerebral infarct Hx (Y/N): Yes TIA ~2014, first event ~age 1 with full stroke and R sided weakness  Last PCP Visit: 3/6/23  Vaccines:  PCV13: 5/13/19  Pneumovax (PPSV23): 3/04, 10/09, 7/12/19 (next due 7/2024)  Menactra: 4/2010, 9/2015 (MCV done 8/16/21)  MenB: 9/16/15, 5/13/19  Influenza: 10/2/23  Audiology (chelation): done 6/2020, normal.. However, on 6/7, \"While there is no significant change in grade on the CTCAE 4.03 Scale, there were hearing changes bilaterally for both standard and extended high frequency audiometry.  Will need to determine if an ENT consult is advised due to the asymmetry in extended high frequency testing.\"     Plan last reviewed with patient: 10/2/23    Patient background: 24 yo F, enjoys movies and kids though there are times where she does not really want to talk to people. Does not have a lot of social support at home.     Sickle Cell Disease History  Primary Hematologist Team: Jose Rafael Duncan  PCP: none  Genotype: SS  Acute Pain Crisis Treatment: (limiting IV   ER   Dilaudid 1 mg IV q1h up to 3 doses  Toradol 30 mg IV x1   Maintenance IV fluids with LR  Other: Zofran 8 mg IV PRN nausea  Inpatient:  PCA Dilaudid 1 hr q30 minutes, no basal rate  Toradol 30 mg x6h x 4 hr  LR maintenance x 1-2 days  Other Medications: Zofran  ASA  Supportive Care: Docusate, Senna  Chronic Pain Medications:    Home regimen: oxycodone 15 mg p.o. q.4-6 hours p.r.n. breakthrough pain.  She also continues on Voltaren gel, and Zoloft among other medications.    -Also benefits from mental health visits, acupuncture  Baseline Hemoglobin: 7 g/dl without chronic transfusions  Hydroxyurea " use: Yes  H/O blood transfusions: Yes, several (iron overload) Most recent 11/20/2021  H/O Transfusion Reactions: no  Antibodies:none  H/O Acute Chest Syndrome: Yes  Last episode:9/05/22 (previously 4/26/21, 10/2019)   ICU/intubation: not with 9/2022 admission  H/O Stroke: Yes (managed with chronic transfusions in the past, stopped late Spring 2020)  H/O VTE: Yes (2/2021)  H/O Cholecystectomy or Splenectomy: no  H/O Asthma, Pulm HTN, AVN, Leg Ulcers, Nephropathy, Retinopathy, etc: Iron overload, asthma, chronic lung disease, physical limitations from early stroke    ---------------------------------------  Jennifer Cervantes's Goals (discussed today, 11/3/23)    1-3 month goal:  Reach a year with her boyfriend (achieved!)    6 month goal:  Save money for ultimate goal of moving out    12 month goal:  Move into her own place     Disease-specific goal(s):  Continue to wean oxycodone down, next with reduced doses. Minimize infusion center visits.  ---------------------------------------      Current Outpatient Medications   Medication Sig Dispense Refill    oxyCODONE IR (ROXICODONE) 15 MG tablet Take 1 tablet (15 mg) by mouth every 4 hours as needed for severe pain or breakthrough pain Goal 4 per day. Max 6 per day. 25 tablet 0    acetaminophen (TYLENOL) 325 MG tablet Take 2 tablets (650 mg) by mouth every 6 hours as needed for mild pain 120 tablet 3    albuterol (PROAIR HFA/PROVENTIL HFA/VENTOLIN HFA) 108 (90 Base) MCG/ACT inhaler Inhale 2 puffs into the lungs every 6 hours as needed for shortness of breath or wheezing 8.5 g 3    albuterol (PROVENTIL) (2.5 MG/3ML) 0.083% neb solution Take 2 vials (5 mg) by nebulization every 6 hours as needed for shortness of breath or wheezing 90 mL 3    aspirin (ASA) 81 MG chewable tablet Take 1 tablet (81 mg) by mouth 2 times daily 60 tablet 11    budesonide-formoterol (SYMBICORT) 160-4.5 MCG/ACT Inhaler Inhale 2 puffs into the lungs 2 times daily 10.2 g 3    cetirizine (ZYRTEC) 10 MG  tablet Take 1 tablet (10 mg) by mouth daily 30 tablet 1    deferasirox (JADENU) 360 MG tablet Take 4 tablets (1,440 mg) by mouth every evening 120 tablet 4    diphenhydrAMINE (BENADRYL) 25 MG capsule Take 1 capsule (25 mg) by mouth every 6 hours as needed for itching or allergies 30 capsule 0    EPINEPHrine (ANY BX GENERIC EQUIV) 0.3 MG/0.3ML injection 2-pack Inject 0.3 mLs (0.3 mg) into the muscle as needed for anaphylaxis 1 each 1    Hydroxyurea 1000 MG TABS Take 3,000 mg by mouth daily 90 tablet 3    methocarbamol (ROBAXIN) 750 MG tablet Take 1 tablet (750 mg) by mouth 4 times daily as needed for muscle spasms (during sickle pain crises. Okay to take scheduled while in pain) 60 tablet 1    naloxone (NARCAN) 4 MG/0.1ML nasal spray Spray 4 mg into one nostril alternating nostrils as needed for opioid reversal every 2-3 minutes until assistance arrives      ondansetron (ZOFRAN) 8 MG tablet Take 1 tablet (8 mg) by mouth every 8 hours as needed 30 tablet 1    predniSONE (DELTASONE) 20 MG tablet Take 1 tablet (20 mg) by mouth daily 3 tablet 0       Past Medical History  Past Medical History:   Diagnosis Date    Anxiety     Bleeding disorder (H24)     Blood clotting disorder (H24)     Cerebral infarction (H) 2015    Cognitive developmental delay     low IQ. 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 side (H)     right hand contractures    Iron overload due to repeated red blood cell transfusions     Migraines     Multiple subsegmental pulmonary emboli without acute cor pulmonale (H) 02/01/2021    Oppositional defiant behavior     Presence of intrauterine contraceptive device 2/18/2020    Superficial venous thrombosis of arm, right 03/25/2021    Uncomplicated asthma      Past Surgical History:   Procedure Laterality Date    AS INSERT TUNNELED CV 2 CATH W/O PORT/PUMP      CHOLECYSTECTOMY      CV  RIGHT HEART CATH MEASUREMENTS RECORDED N/A 11/18/2021    Procedure: Right Heart Cath;  Surgeon: Jackson Stauffer MD;  Location:  [facility]ATH [facility]    INSERT PORT VASCULAR ACCESS Left 4/21/2021    Procedure: INSERTION, VASCULAR ACCESS PORT (NOT SURE ON SIDE UNTIL REMOVAL);  Surgeon: Rajan More MD;  Location: [facility] OR    IR CHEST PORT PLACEMENT > 5 YRS OF AGE  4/21/2021    IR CVC NON TUNNEL LINE REMOVAL  5/6/2021    IR CVC NON TUNNEL PLACEMENT > 5 YRS  04/07/2020    IR CVC NON TUNNEL PLACEMENT > 5 YRS  4/30/2021    IR CVC NON TUNNEL PLACEMENT > 5 YRS  9/7/2022    IR PORT REMOVAL LEFT  4/21/2021    REMOVE PORT VASCULAR ACCESS Left 4/21/2021    Procedure: REMOVAL, VASCULAR ACCESS PORT LEFT;  Surgeon: Rajan More MD;  Location: [facility] OR    REPAIR TENDON ELBOW Right 10/02/2019    Procedure: Right Elbow Flexor Lengthening, Flexor Pronator Slide Of Wrist and Finger, Thumb Adductor Lengthening;  Surgeon: Anai Franco MD;  Location: [facility] OR    TONSILLECTOMY Bilateral 10/02/2019    Procedure: Bilateral Tonsillectomy;  Surgeon: Farhana Guy MD;  Location: [facility] OR    ZZC BREAST REDUCTION (INCLUDES LIPO) TIER 3 Bilateral 04/23/2019     Allergies   Allergen Reactions    Contrast Dye      Hives and breathing issues    Fish-Derived Products Hives    Seafood Hives    Gadolinium     Iodinated Contrast Media      Social History   Social History     Tobacco Use    Smoking status: Never     Passive exposure: Never    Smokeless tobacco: Never   Substance Use Topics    Alcohol use: Not Currently     Alcohol/week: 0.0 standard drinks of alcohol    Drug use: Never    Still living at home with mom and extended family.     Past medical history and social history were reviewed.    Physical Examination:  /67   Pulse 89   Temp 97.9  F (36.6  C)   Resp 16   SpO2 95%      Wt Readings from Last 10 Encounters:   11/01/23 65.9 kg (145 lb 4.8 oz)   10/27/23 68 kg (150 lb)   10/13/23 70.3 kg (155 lb)    10/12/23 66.9 kg (147 lb 6.4 oz)   10/02/23 66.6 kg (146 lb 12.8 oz)   09/22/23 68 kg (150 lb)   09/08/23 65.4 kg (144 lb 3.2 oz)   08/11/23 65.5 kg (144 lb 4.8 oz)   07/14/23 65.2 kg (143 lb 12.8 oz)   07/10/23 65.3 kg (144 lb)     General: Pleasant female, NAD, smiling and happy today  Eyes: EOMI, PERRL. Mild. scleral icterus.  Respiratory: Normal respiratory effort.  Ext: No peripheral edema.  MSK: Contractured right arm and hand 2/2 stoke.  Neurologic: Grossly nonfocal. A/O x 4.  Skin: No rashes, petechiae, or bruising noted on exposed skin. Port accessed in left chest    Laboratory Data:  Recent Results (from the past 240 hour(s))   Extra Blue Top Tube    Collection Time: 10/27/23  8:13 AM   Result Value Ref Range    Hold Specimen JIC    Extra Red Top Tube    Collection Time: 10/27/23  8:13 AM   Result Value Ref Range    Hold Specimen JIC    Extra Green Top (Lithium Heparin) Tube    Collection Time: 10/27/23  8:13 AM   Result Value Ref Range    Hold Specimen JIC    Extra Purple Top Tube    Collection Time: 10/27/23  8:13 AM   Result Value Ref Range    Hold Specimen JIC    Basic metabolic panel    Collection Time: 10/27/23  8:13 AM   Result Value Ref Range    Sodium 140 135 - 145 mmol/L    Potassium 4.2 3.4 - 5.3 mmol/L    Chloride 107 98 - 107 mmol/L    Carbon Dioxide (CO2) 22 22 - 29 mmol/L    Anion Gap 11 7 - 15 mmol/L    Urea Nitrogen 11.7 6.0 - 20.0 mg/dL    Creatinine 0.75 0.51 - 0.95 mg/dL    GFR Estimate >90 >60 mL/min/1.73m2    Calcium 8.9 8.6 - 10.0 mg/dL    Glucose 91 70 - 99 mg/dL   CBC with platelets and differential    Collection Time: 10/27/23  8:13 AM   Result Value Ref Range    WBC Count 14.1 (H) 4.0 - 11.0 10e3/uL    RBC Count 2.32 (L) 3.80 - 5.20 10e6/uL    Hemoglobin 7.1 (L) 11.7 - 15.7 g/dL    Hematocrit 20.8 (L) 35.0 - 47.0 %    MCV 90 78 - 100 fL    MCH 30.6 26.5 - 33.0 pg    MCHC 34.1 31.5 - 36.5 g/dL    RDW 28.0 (H) 10.0 - 15.0 %    Platelet Count 398 150 - 450 10e3/uL    % Neutrophils  70 %    % Lymphocytes 15 %    % Monocytes 6 %    % Eosinophils 3 %    % Basophils 2 %    % Immature Granulocytes 4 %    NRBCs per 100 WBC 5 (H) <1 /100    Absolute Neutrophils 9.8 (H) 1.6 - 8.3 10e3/uL    Absolute Lymphocytes 2.1 0.8 - 5.3 10e3/uL    Absolute Monocytes 0.9 0.0 - 1.3 10e3/uL    Absolute Eosinophils 0.5 0.0 - 0.7 10e3/uL    Absolute Basophils 0.3 (H) 0.0 - 0.2 10e3/uL    Absolute Immature Granulocytes 0.5 (H) <=0.4 10e3/uL    Absolute NRBCs 0.7 10e3/uL   EKG 12-lead, tracing only    Collection Time: 11/01/23  7:04 PM   Result Value Ref Range    Systolic Blood Pressure  mmHg    Diastolic Blood Pressure  mmHg    Ventricular Rate 101 BPM    Atrial Rate 101 BPM    MD Interval 142 ms    QRS Duration 76 ms     ms    QTc 482 ms    P Axis 67 degrees    R AXIS 58 degrees    T Axis -12 degrees    Interpretation ECG       Sinus tachycardia  Nonspecific T wave abnormality  Abnormal ECG  Unconfirmed report - interpretation of this ECG is computer generated - see medical record for final interpretation  Confirmed by - EMERGENCY ROOM, PHYSICIAN ([id number]),  RACHEL CARRASQUILLO ([id number]) on 11/2/2023 10:39:47 PM     Comprehensive metabolic panel    Collection Time: 11/01/23  7:27 PM   Result Value Ref Range    Sodium      Potassium      Carbon Dioxide (CO2)      Anion Gap      Urea Nitrogen      Creatinine      GFR Estimate      Calcium      Chloride      Glucose      Alkaline Phosphatase      AST      ALT      Protein Total      Albumin      Bilirubin Total     Reticulocyte count    Collection Time: 11/01/23  7:27 PM   Result Value Ref Range    % Reticulocyte 21.7 (H) 0.5 - 2.0 %    Absolute Reticulocyte 0.338 (H) 0.025 - 0.095 10e6/uL   CBC with platelets and differential    Collection Time: 11/01/23  7:27 PM   Result Value Ref Range    WBC Count      RBC Count      Hemoglobin      Hematocrit      MCV      MCH      MCHC      RDW      Platelet Count      % Neutrophils      % Lymphocytes      % Monocytes      %  Eosinophils      % Basophils      % Immature Granulocytes      Absolute Neutrophils      Absolute Lymphocytes      Absolute Monocytes      Absolute Eosinophils      Absolute Basophils      Absolute Immature Granulocytes     Comprehensive metabolic panel    Collection Time: 11/01/23  8:40 PM   Result Value Ref Range    Sodium 137 135 - 145 mmol/L    Potassium 3.5 3.4 - 5.3 mmol/L    Carbon Dioxide (CO2) 24 22 - 29 mmol/L    Anion Gap 7 7 - 15 mmol/L    Urea Nitrogen 9.1 6.0 - 20.0 mg/dL    Creatinine 0.55 0.51 - 0.95 mg/dL    GFR Estimate >90 >60 mL/min/1.73m2    Calcium 8.1 (L) 8.6 - 10.0 mg/dL    Chloride 106 98 - 107 mmol/L    Glucose 95 70 - 99 mg/dL    Alkaline Phosphatase 59 35 - 104 U/L    AST 49 (H) 0 - 45 U/L    ALT 26 0 - 50 U/L    Protein Total 6.7 6.4 - 8.3 g/dL    Albumin 4.2 3.5 - 5.2 g/dL    Bilirubin Total 3.6 (H) <=1.2 mg/dL   CBC with platelets and differential    Collection Time: 11/01/23  8:40 PM   Result Value Ref Range    WBC Count 14.6 (H) 4.0 - 11.0 10e3/uL    RBC Count 2.17 (L) 3.80 - 5.20 10e6/uL    Hemoglobin 7.0 (L) 11.7 - 15.7 g/dL    Hematocrit 19.6 (L) 35.0 - 47.0 %    MCV 90 78 - 100 fL    MCH 32.3 26.5 - 33.0 pg    MCHC 35.7 31.5 - 36.5 g/dL    RDW 24.4 (H) 10.0 - 15.0 %    Platelet Count 297 150 - 450 10e3/uL    % Neutrophils 73 %    % Lymphocytes 17 %    % Monocytes 7 %    % Eosinophils 2 %    % Basophils 1 %    % Immature Granulocytes 0 %    NRBCs per 100 WBC 2 (H) <1 /100    Absolute Neutrophils 10.6 (H) 1.6 - 8.3 10e3/uL    Absolute Lymphocytes 2.5 0.8 - 5.3 10e3/uL    Absolute Monocytes 1.0 0.0 - 1.3 10e3/uL    Absolute Eosinophils 0.3 0.0 - 0.7 10e3/uL    Absolute Basophils 0.2 0.0 - 0.2 10e3/uL    Absolute Immature Granulocytes 0.1 <=0.4 10e3/uL    Absolute NRBCs 0.3 10e3/uL   hCG qual urine POCT    Collection Time: 11/01/23 10:16 PM   Result Value Ref Range    HCG Qual Urine Negative Negative    Internal QC Check POCT Valid Valid    POCT Kit Lot Number [id number]     POCT Kit  Expiration Date 05/29/2025    Basic metabolic panel    Collection Time: 11/03/23 10:10 AM   Result Value Ref Range    Sodium 140 135 - 145 mmol/L    Potassium 4.0 3.4 - 5.3 mmol/L    Chloride 108 (H) 98 - 107 mmol/L    Carbon Dioxide (CO2) 22 22 - 29 mmol/L    Anion Gap 10 7 - 15 mmol/L    Urea Nitrogen 7.6 6.0 - 20.0 mg/dL    Creatinine 0.47 (L) 0.51 - 0.95 mg/dL    GFR Estimate >90 >60 mL/min/1.73m2    Calcium 8.8 8.6 - 10.0 mg/dL    Glucose 90 70 - 99 mg/dL   Reticulocyte count    Collection Time: 11/03/23 10:10 AM   Result Value Ref Range    % Reticulocyte 22.5 (H) 0.5 - 2.0 %    Absolute Reticulocyte 0.498 (H) 0.025 - 0.095 10e6/uL   Ferritin    Collection Time: 11/03/23 10:10 AM   Result Value Ref Range    Ferritin 32 6 - 175 ng/mL   CBC with platelets and differential    Collection Time: 11/03/23 10:10 AM   Result Value Ref Range    WBC Count 10.4 4.0 - 11.0 10e3/uL    RBC Count 2.22 (L) 3.80 - 5.20 10e6/uL    Hemoglobin 7.0 (L) 11.7 - 15.7 g/dL    Hematocrit 19.3 (L) 35.0 - 47.0 %    MCV 87 78 - 100 fL    MCH 31.5 26.5 - 33.0 pg    MCHC 36.3 31.5 - 36.5 g/dL    RDW 23.8 (H) 10.0 - 15.0 %    Platelet Count 363 150 - 450 10e3/uL    % Neutrophils 65 %    % Lymphocytes 19 %    % Monocytes 9 %    % Eosinophils 4 %    % Basophils 2 %    % Immature Granulocytes 1 %    NRBCs per 100 WBC 2 (H) <1 /100    Absolute Neutrophils 6.8 1.6 - 8.3 10e3/uL    Absolute Lymphocytes 2.0 0.8 - 5.3 10e3/uL    Absolute Monocytes 0.9 0.0 - 1.3 10e3/uL    Absolute Eosinophils 0.4 0.0 - 0.7 10e3/uL    Absolute Basophils 0.2 0.0 - 0.2 10e3/uL    Absolute Immature Granulocytes 0.1 <=0.4 10e3/uL    Absolute NRBCs 0.2 10e3/uL       Assessment and Plan:  1. Sickle Cell HgbSS Disease  2. Acute pain crises  3. Chronic Pain  4. 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 History of CVA  7. Hearing loss    Jennifer is seen today for routine hematology follow-up and monthly sidney infusion. She has  had an increase in ED visits this past month with a total of 4 visits for sickle cell pain crisis. It seems that the change in weather as well as more physical demand with her new job may be contributing to the increase in pain. She denies any recent concerns with her breathing or asthma exacerbations.     She continues to work toward tapering oxycodone although is now receiving 25 tablets of oxycodone (15 mg) weekly which is an increase from 10 tablets weekly. She believes she was prescribed an increased quantity in an effort to effectively manage her pain crises outpatient with the hope to avoid ED visits.    We again reviewed our goal to get back on track with her oxycodone taper. I proposed we either decrease her quantity or decrease the oxycodone dose at this time. Jennifer would like to continue with her current prescription of 25 tablets today and decrease quantity to 10 tablets next week at a continued dose of 15 mg. Once she has successfully returned to a 10 tablet refill we will begin to taper her dose, next to 10 mg, which she is agreeable to.     She is due for a COVID booster today although declines stating she knows many family members who have become ill to the point of requiring hospitalization following COVID vaccination. She feels that since she is at high risk due to underlying sickle cell, history of blood clots and asthma that the vaccine would be dangerous. I explained that a symptomatic immune response is normal and expected following vaccination and that she in fact is at a high risk for complications of thrombosis, sickle cell crises or asthma exacerbation with COVID infection.    Her ED pain pain has been adjusted to now be able to receive IV dilaudid and take a break from ketamine (updated 10/2/23. She feels that sidney infusions are helpful in reducing crises. We will continue monthly infusions.    Desferal is currently on hold due to desire to stop/hold infusion due to decreased quality of  life related to the amount of time she needed to be connected for infusion. Remains on Jadenu. Her Ferriscan in September 2023 did show improvement from last year. We do need to make sure she gets back into audiology and ophthalmology annually due to chelator. Last eye exam was recently completed on 10/25/23. Last audiology exam/audiogram performed 6/22/22. Will request audiology visit today.      Plan:  -Continue Hydrea to 3000mg daily to help lessen frequency of sickle cell pain.   -She has resumed crizanlizumab infusions which we will continue monthly  -Continue slow taper of oxycodone. Prescription was recently increased to 25 tablets per week, 15 mg tablets. Following discussion with Jennifer, will plan to stay at 25 tablets with refill today and decrease back to 10 tablets per week next week which is Jennifer's preference. Following quantity taper, will continue to work on reducing dose. She can self-reduce infusion days/week and we will continue to keep the cap at 2/week for now.  -Continue infusion center visits limited to two times per week (Mondays and Fridays ideally although still needs to call to request). Continue diligent home management with current medications, heat, rest, compression, warm baths. Methocarbamol was recently added which Jennifer is utilizing and finds helpful.  -If unable to manage at home can go to ED  with continued plan to use IV Dilaudid and take a break from ketamine (10/2/23). No PCA use and goal for any admission would still be to discharge by 5 days or less  - Desferal infusions on hold. Continue Jadenu. Repeat Ferriscan in 4-6 months (January-March 2024  -Continue aspirin BID  -RTC with me in 2 months, coordinate with sidney infusions. Again reviewed recommendation for COVID vaccination which, after detailed discussion, she again declines today.    75 minutes spent on the date of the encounter doing chart review, review of test results, interpretation of tests, patient visit, and  documentation     Patricia Mantilla, CNP

## 2023-11-02 NOTE — DISCHARGE INSTRUCTIONS
Discharge to home continue with outpatient management follow-up with your hematologist this week return if marked increase in pain or development of chest pain or shortness of breath.

## 2023-11-03 ENCOUNTER — NURSE TRIAGE (OUTPATIENT)
Dept: ONCOLOGY | Facility: CLINIC | Age: 23
End: 2023-11-03
Payer: COMMERCIAL

## 2023-11-03 ENCOUNTER — ONCOLOGY VISIT (OUTPATIENT)
Dept: ONCOLOGY | Facility: CLINIC | Age: 23
End: 2023-11-03
Attending: REGISTERED NURSE
Payer: COMMERCIAL

## 2023-11-03 ENCOUNTER — INFUSION THERAPY VISIT (OUTPATIENT)
Dept: ONCOLOGY | Facility: CLINIC | Age: 23
End: 2023-11-03
Attending: PEDIATRICS
Payer: COMMERCIAL

## 2023-11-03 ENCOUNTER — APPOINTMENT (OUTPATIENT)
Dept: LAB | Facility: CLINIC | Age: 23
End: 2023-11-03
Attending: PEDIATRICS
Payer: COMMERCIAL

## 2023-11-03 VITALS
RESPIRATION RATE: 16 BRPM | SYSTOLIC BLOOD PRESSURE: 121 MMHG | TEMPERATURE: 97.9 F | HEART RATE: 89 BPM | OXYGEN SATURATION: 95 % | DIASTOLIC BLOOD PRESSURE: 67 MMHG

## 2023-11-03 DIAGNOSIS — E83.111 IRON OVERLOAD DUE TO REPEATED RED BLOOD CELL TRANSFUSIONS: ICD-10-CM

## 2023-11-03 DIAGNOSIS — D57.1 HB-SS DISEASE WITHOUT CRISIS (H): Primary | ICD-10-CM

## 2023-11-03 DIAGNOSIS — D57.1 HB-SS DISEASE WITHOUT CRISIS (H): ICD-10-CM

## 2023-11-03 DIAGNOSIS — D57.00 SICKLE CELL PAIN CRISIS (H): Primary | ICD-10-CM

## 2023-11-03 DIAGNOSIS — G81.10 SPASTIC HEMIPLEGIA, UNSPECIFIED ETIOLOGY, UNSPECIFIED LATERALITY (H): ICD-10-CM

## 2023-11-03 DIAGNOSIS — D57.00 SICKLE CELL PAIN CRISIS (H): ICD-10-CM

## 2023-11-03 LAB
ANION GAP SERPL CALCULATED.3IONS-SCNC: 10 MMOL/L (ref 7–15)
BASOPHILS # BLD AUTO: 0.2 10E3/UL (ref 0–0.2)
BASOPHILS NFR BLD AUTO: 2 %
BUN SERPL-MCNC: 7.6 MG/DL (ref 6–20)
CALCIUM SERPL-MCNC: 8.8 MG/DL (ref 8.6–10)
CHLORIDE SERPL-SCNC: 108 MMOL/L (ref 98–107)
CREAT SERPL-MCNC: 0.47 MG/DL (ref 0.51–0.95)
DEPRECATED HCO3 PLAS-SCNC: 22 MMOL/L (ref 22–29)
EGFRCR SERPLBLD CKD-EPI 2021: >90 ML/MIN/1.73M2
EOSINOPHIL # BLD AUTO: 0.4 10E3/UL (ref 0–0.7)
EOSINOPHIL NFR BLD AUTO: 4 %
ERYTHROCYTE [DISTWIDTH] IN BLOOD BY AUTOMATED COUNT: 23.8 % (ref 10–15)
FERRITIN SERPL-MCNC: 32 NG/ML (ref 6–175)
GLUCOSE SERPL-MCNC: 90 MG/DL (ref 70–99)
HCT VFR BLD AUTO: 19.3 % (ref 35–47)
HGB BLD-MCNC: 7 G/DL (ref 11.7–15.7)
IMM GRANULOCYTES # BLD: 0.1 10E3/UL
IMM GRANULOCYTES NFR BLD: 1 %
LYMPHOCYTES # BLD AUTO: 2 10E3/UL (ref 0.8–5.3)
LYMPHOCYTES NFR BLD AUTO: 19 %
MCH RBC QN AUTO: 31.5 PG (ref 26.5–33)
MCHC RBC AUTO-ENTMCNC: 36.3 G/DL (ref 31.5–36.5)
MCV RBC AUTO: 87 FL (ref 78–100)
MONOCYTES # BLD AUTO: 0.9 10E3/UL (ref 0–1.3)
MONOCYTES NFR BLD AUTO: 9 %
NEUTROPHILS # BLD AUTO: 6.8 10E3/UL (ref 1.6–8.3)
NEUTROPHILS NFR BLD AUTO: 65 %
NRBC # BLD AUTO: 0.2 10E3/UL
NRBC BLD AUTO-RTO: 2 /100
PLATELET # BLD AUTO: 363 10E3/UL (ref 150–450)
POTASSIUM SERPL-SCNC: 4 MMOL/L (ref 3.4–5.3)
RBC # BLD AUTO: 2.22 10E6/UL (ref 3.8–5.2)
RETICS # AUTO: 0.5 10E6/UL (ref 0.03–0.1)
RETICS/RBC NFR AUTO: 22.5 % (ref 0.5–2)
SODIUM SERPL-SCNC: 140 MMOL/L (ref 135–145)
WBC # BLD AUTO: 10.4 10E3/UL (ref 4–11)

## 2023-11-03 PROCEDURE — 80048 BASIC METABOLIC PNL TOTAL CA: CPT | Performed by: PEDIATRICS

## 2023-11-03 PROCEDURE — 85025 COMPLETE CBC W/AUTO DIFF WBC: CPT | Performed by: PEDIATRICS

## 2023-11-03 PROCEDURE — 96365 THER/PROPH/DIAG IV INF INIT: CPT

## 2023-11-03 PROCEDURE — 82728 ASSAY OF FERRITIN: CPT

## 2023-11-03 PROCEDURE — 258N000003 HC RX IP 258 OP 636: Performed by: PEDIATRICS

## 2023-11-03 PROCEDURE — 250N000011 HC RX IP 250 OP 636: Performed by: PEDIATRICS

## 2023-11-03 PROCEDURE — 96361 HYDRATE IV INFUSION ADD-ON: CPT

## 2023-11-03 PROCEDURE — 99215 OFFICE O/P EST HI 40 MIN: CPT | Performed by: REGISTERED NURSE

## 2023-11-03 PROCEDURE — 85045 AUTOMATED RETICULOCYTE COUNT: CPT | Performed by: PEDIATRICS

## 2023-11-03 PROCEDURE — 250N000011 HC RX IP 250 OP 636: Mod: JZ | Performed by: REGISTERED NURSE

## 2023-11-03 PROCEDURE — 96413 CHEMO IV INFUSION 1 HR: CPT

## 2023-11-03 PROCEDURE — 99417 PROLNG OP E/M EACH 15 MIN: CPT | Performed by: REGISTERED NURSE

## 2023-11-03 PROCEDURE — 96376 TX/PRO/DX INJ SAME DRUG ADON: CPT

## 2023-11-03 PROCEDURE — 250N000013 HC RX MED GY IP 250 OP 250 PS 637: Performed by: PEDIATRICS

## 2023-11-03 PROCEDURE — 36591 DRAW BLOOD OFF VENOUS DEVICE: CPT | Performed by: PEDIATRICS

## 2023-11-03 PROCEDURE — 96375 TX/PRO/DX INJ NEW DRUG ADDON: CPT

## 2023-11-03 PROCEDURE — G0463 HOSPITAL OUTPT CLINIC VISIT: HCPCS | Performed by: REGISTERED NURSE

## 2023-11-03 RX ORDER — KETOROLAC TROMETHAMINE 30 MG/ML
30 INJECTION, SOLUTION INTRAMUSCULAR; INTRAVENOUS ONCE
Status: COMPLETED | OUTPATIENT
Start: 2023-11-03 | End: 2023-11-03

## 2023-11-03 RX ORDER — OXYCODONE HYDROCHLORIDE 15 MG/1
15 TABLET ORAL EVERY 4 HOURS PRN
Qty: 25 TABLET | Refills: 0 | Status: SHIPPED | OUTPATIENT
Start: 2023-11-03 | End: 2023-11-08

## 2023-11-03 RX ORDER — HEPARIN SODIUM (PORCINE) LOCK FLUSH IV SOLN 100 UNIT/ML 100 UNIT/ML
5 SOLUTION INTRAVENOUS
Status: CANCELLED | OUTPATIENT
Start: 2024-01-01

## 2023-11-03 RX ORDER — HEPARIN SODIUM (PORCINE) LOCK FLUSH IV SOLN 100 UNIT/ML 100 UNIT/ML
5 SOLUTION INTRAVENOUS
Status: DISCONTINUED | OUTPATIENT
Start: 2023-11-03 | End: 2023-11-03 | Stop reason: HOSPADM

## 2023-11-03 RX ORDER — KETOROLAC TROMETHAMINE 30 MG/ML
30 INJECTION, SOLUTION INTRAMUSCULAR; INTRAVENOUS ONCE
Status: CANCELLED
Start: 2024-01-01 | End: 2024-01-01

## 2023-11-03 RX ORDER — DIPHENHYDRAMINE HCL 25 MG
25 CAPSULE ORAL
Status: COMPLETED | OUTPATIENT
Start: 2023-11-03 | End: 2023-11-03

## 2023-11-03 RX ORDER — ONDANSETRON 4 MG/1
8 TABLET, ORALLY DISINTEGRATING ORAL
Status: COMPLETED | OUTPATIENT
Start: 2023-11-03 | End: 2023-11-03

## 2023-11-03 RX ORDER — ONDANSETRON 4 MG/1
8 TABLET, FILM COATED ORAL
Status: CANCELLED
Start: 2024-01-01

## 2023-11-03 RX ORDER — HEPARIN SODIUM (PORCINE) LOCK FLUSH IV SOLN 100 UNIT/ML 100 UNIT/ML
5 SOLUTION INTRAVENOUS EVERY 8 HOURS
Status: DISCONTINUED | OUTPATIENT
Start: 2023-11-03 | End: 2023-11-03 | Stop reason: HOSPADM

## 2023-11-03 RX ORDER — HEPARIN SODIUM,PORCINE 10 UNIT/ML
5 VIAL (ML) INTRAVENOUS
Status: CANCELLED | OUTPATIENT
Start: 2024-01-01

## 2023-11-03 RX ORDER — DIPHENHYDRAMINE HCL 25 MG
25 CAPSULE ORAL
Status: CANCELLED
Start: 2024-01-01

## 2023-11-03 RX ORDER — ONDANSETRON 4 MG/1
8 TABLET, FILM COATED ORAL
Status: DISCONTINUED | OUTPATIENT
Start: 2023-11-03 | End: 2023-11-03

## 2023-11-03 RX ORDER — HEPARIN SODIUM,PORCINE 10 UNIT/ML
5 VIAL (ML) INTRAVENOUS
Status: DISCONTINUED | OUTPATIENT
Start: 2023-11-03 | End: 2023-11-03 | Stop reason: HOSPADM

## 2023-11-03 RX ADMIN — HYDROMORPHONE HYDROCHLORIDE 1 MG: 1 INJECTION, SOLUTION INTRAMUSCULAR; INTRAVENOUS; SUBCUTANEOUS at 11:49

## 2023-11-03 RX ADMIN — SODIUM CHLORIDE 250 ML: 9 INJECTION, SOLUTION INTRAVENOUS at 12:52

## 2023-11-03 RX ADMIN — Medication 5 ML: at 14:56

## 2023-11-03 RX ADMIN — KETOROLAC TROMETHAMINE 30 MG: 30 INJECTION, SOLUTION INTRAMUSCULAR; INTRAVENOUS at 11:48

## 2023-11-03 RX ADMIN — SODIUM CHLORIDE 334.5 MG: 9 INJECTION, SOLUTION INTRAVENOUS at 12:51

## 2023-11-03 RX ADMIN — SODIUM CHLORIDE, POTASSIUM CHLORIDE, SODIUM LACTATE AND CALCIUM CHLORIDE 1000 ML: 600; 310; 30; 20 INJECTION, SOLUTION INTRAVENOUS at 11:36

## 2023-11-03 RX ADMIN — DIPHENHYDRAMINE HYDROCHLORIDE 25 MG: 25 CAPSULE ORAL at 11:52

## 2023-11-03 RX ADMIN — HYDROMORPHONE HYDROCHLORIDE 1 MG: 1 INJECTION, SOLUTION INTRAMUSCULAR; INTRAVENOUS; SUBCUTANEOUS at 14:12

## 2023-11-03 RX ADMIN — Medication 5 ML: at 10:11

## 2023-11-03 RX ADMIN — ONDANSETRON 8 MG: 4 TABLET, ORALLY DISINTEGRATING ORAL at 12:09

## 2023-11-03 RX ADMIN — HYDROMORPHONE HYDROCHLORIDE 1 MG: 1 INJECTION, SOLUTION INTRAMUSCULAR; INTRAVENOUS; SUBCUTANEOUS at 12:45

## 2023-11-03 ASSESSMENT — PAIN SCALES - GENERAL: PAINLEVEL: EXTREME PAIN (9)

## 2023-11-03 NOTE — Clinical Note
"    11/3/2023         RE: Jennifer Cervantes  [address]  [facility] [address]        Dear Colleague,    Thank you for referring your patient, Jennifer Cervantes, to the [facility]. Please see a copy of my visit note below.    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 She had multiple thromboembolic events in 2021 despite adherent anticoagulation use (though warfarin was perpetually low) and there are concerns for chronic thromboembolic disease but did not have pulmonary HTN on a November 2021 cath. She is maintained on chronic PO opioids and twice-weekly infusion visits (since 1/24/22) but has been able to be maintained on this regimen and has stayed out of the ED most of the time with even rarer admissions.     Interval History:  ***    Sickle Cell Disease Comprehensive Checklist  Bone Health/Avascular Necrosis Screening/Symptoms (each visit): no new concerns today  Leg Ulcer evaluation (every visit): none  Hypertension (every visit):stable 3/10/23  Last pulmonary evaluation (asthma, AMAN, pulm HTN): 9/28/22  Stroke/silent cerebral infarct Hx (Y/N): Yes TIA ~2014, first event ~age 1 with full stroke and R sided weakness  Last PCP Visit: 3/6/23  Vaccines:  PCV13: 5/13/19  Pneumovax (PPSV23): 3/04, 10/09, 7/12/19 (next due 7/2024)  Menactra: 4/2010, 9/2015 (MCV done 8/16/21)  Influenza: 11/17/2022   Audiology (chelation): done 6/2020, normal.. However, on 6/7, \"While there is no significant change in grade on " "the CTCAE 4.03 Scale, there were hearing changes bilaterally for both standard and extended high frequency audiometry.  Will need to determine if an ENT consult is advised due to the asymmetry in extended high frequency testing.\"     Plan last reviewed with patient: 10/2/23    Patient background: 22 yo F, enjoys movies and kids though there are times where she does not really want to talk to people. Does not have a lot of social support at home.     Sickle Cell Disease History  Primary Hematologist Team: Jose Rafael Duncan  PCP: none  Genotype: SS  Acute Pain Crisis Treatment: (limiting IV   ER   Dilaudid 1 mg IV q1h up to 3 doses  Toradol 30 mg IV x1   Maintenance IV fluids with LR  Other: Zofran 8 mg IV PRN nausea  Inpatient:  PCA Dilaudid 1 hr q30 minutes, no basal rate  Toradol 30 mg x6h x 4 hr  LR maintenance x 1-2 days  Other Medications: Zofran  ASA  Supportive Care: Docusate, Senna  Chronic Pain Medications:    Home regimen: oxycodone 15 mg p.o. q.4-6 hours p.r.n. breakthrough pain.  She also continues on Voltaren gel, and Zoloft among other medications.    -Also benefits from mental health visits, acupuncture  Baseline Hemoglobin: 7 g/dl without chronic transfusions  Hydroxyurea use: Yes  H/O blood transfusions: Yes, several (iron overload) Most recent 11/20/2021  H/O Transfusion Reactions: no  Antibodies:none  H/O Acute Chest Syndrome: Yes  Last episode:9/05/22 (previously 4/26/21, 10/2019)   ICU/intubation: not with 9/2022 admission  H/O Stroke: Yes (managed with chronic transfusions in the past, stopped late Spring 2020)  H/O VTE: Yes (2/2021)  H/O Cholecystectomy or Splenectomy: no  H/O Asthma, Pulm HTN, AVN, Leg Ulcers, Nephropathy, Retinopathy, etc: Iron overload, asthma, chronic lung disease, physical limitations from early stroke    ---------------------------------------  Jennifer Cervantes's Goals (discussed today, 10/2/23)    1-3 month goal:  Reach a year with her boyfriend    6 month goal:  Save money " for ultimate goal of moving out    12 month goal:  Move into her own place     Disease-specific goal(s):  Continue to wean oxycodone down, next with reduced doses. Minimize infusion center visits.  ---------------------------------------      Current Outpatient Medications   Medication Sig Dispense Refill    acetaminophen (TYLENOL) 325 MG tablet Take 2 tablets (650 mg) by mouth every 6 hours as needed for mild pain 120 tablet 3    albuterol (PROAIR HFA/PROVENTIL HFA/VENTOLIN HFA) 108 (90 Base) MCG/ACT inhaler Inhale 2 puffs into the lungs every 6 hours as needed for shortness of breath or wheezing 8.5 g 3    albuterol (PROVENTIL) (2.5 MG/3ML) 0.083% neb solution Take 2 vials (5 mg) by nebulization every 6 hours as needed for shortness of breath or wheezing 90 mL 3    aspirin (ASA) 81 MG chewable tablet Take 1 tablet (81 mg) by mouth 2 times daily 60 tablet 11    budesonide-formoterol (SYMBICORT) 160-4.5 MCG/ACT Inhaler Inhale 2 puffs into the lungs 2 times daily 10.2 g 3    cetirizine (ZYRTEC) 10 MG tablet Take 1 tablet (10 mg) by mouth daily 30 tablet 1    deferasirox (JADENU) 360 MG tablet Take 4 tablets (1,440 mg) by mouth every evening 120 tablet 4    diphenhydrAMINE (BENADRYL) 25 MG capsule Take 1 capsule (25 mg) by mouth every 6 hours as needed for itching or allergies 30 capsule 0    EPINEPHrine (ANY BX GENERIC EQUIV) 0.3 MG/0.3ML injection 2-pack Inject 0.3 mLs (0.3 mg) into the muscle as needed for anaphylaxis 1 each 1    Hydroxyurea 1000 MG TABS Take 3,000 mg by mouth daily 90 tablet 3    methocarbamol (ROBAXIN) 750 MG tablet Take 1 tablet (750 mg) by mouth 4 times daily as needed for muscle spasms (during sickle pain crises. Okay to take scheduled while in pain) 60 tablet 1    naloxone (NARCAN) 4 MG/0.1ML nasal spray Spray 4 mg into one nostril alternating nostrils as needed for opioid reversal every 2-3 minutes until assistance arrives      ondansetron (ZOFRAN) 8 MG tablet Take 1 tablet (8 mg) by mouth  every 8 hours as needed 30 tablet 1    oxyCODONE IR (ROXICODONE) 15 MG tablet Take 1 tablet (15 mg) by mouth every 4 hours as needed for severe pain or breakthrough pain Goal 4 per day. Max 6 per day. 25 tablet 0    predniSONE (DELTASONE) 20 MG tablet Take 1 tablet (20 mg) by mouth daily 3 tablet 0       Past Medical History  Past Medical History:   Diagnosis Date    Anxiety     Bleeding disorder (H24)     Blood clotting disorder (H24)     Cerebral infarction (H) 2015    Cognitive developmental delay     low IQ. 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 side (H)     right hand contractures    Iron overload due to repeated red blood cell transfusions     Migraines     Multiple subsegmental pulmonary emboli without acute cor pulmonale (H) 02/01/2021    Oppositional defiant behavior     Presence of intrauterine contraceptive device 2/18/2020    Superficial venous thrombosis of arm, right 03/25/2021    Uncomplicated asthma      Past Surgical History:   Procedure Laterality Date    AS INSERT TUNNELED CV 2 CATH W/O PORT/PUMP      CHOLECYSTECTOMY      CV RIGHT HEART CATH MEASUREMENTS RECORDED N/A 11/18/2021    Procedure: Right Heart Cath;  Surgeon: Jackson Stauffer MD;  Location:  HEART CARDIAC CATH LAB    INSERT PORT VASCULAR ACCESS Left 4/21/2021    Procedure: INSERTION, VASCULAR ACCESS PORT (NOT SURE ON SIDE UNTIL REMOVAL);  Surgeon: Rajan More MD;  Location: [facility] OR    IR CHEST PORT PLACEMENT > 5 YRS OF AGE  4/21/2021    IR CVC NON TUNNEL LINE REMOVAL  5/6/2021    IR CVC NON TUNNEL PLACEMENT > 5 YRS  04/07/2020    IR CVC NON TUNNEL PLACEMENT > 5 YRS  4/30/2021    IR CVC NON TUNNEL PLACEMENT > 5 YRS  9/7/2022    IR PORT REMOVAL LEFT  4/21/2021    REMOVE PORT VASCULAR ACCESS Left 4/21/2021    Procedure: REMOVAL, VASCULAR ACCESS PORT LEFT;  Surgeon: Rajan More MD;  [place] [facility] [place]     REPAIR TENDON ELBOW Right 10/02/2019    Procedure: Right Elbow Flexor Lengthening, Flexor Pronator Slide Of Wrist and Finger, Thumb Adductor Lengthening;  Surgeon: Anai Franco MD;  Location: [facility] OR    TONSILLECTOMY Bilateral 10/02/2019    Procedure: Bilateral Tonsillectomy;  Surgeon: Farhana Guy MD;  Location:  OR    Z BREAST REDUCTION (INCLUDES LIPO) TIER 3 Bilateral 04/23/2019     Allergies   Allergen Reactions    Contrast Dye      Hives and breathing issues    Fish-Derived Products Hives    Seafood Hives    Gadolinium     Iodinated Contrast Media      Social History   Social History     Tobacco Use    Smoking status: Never     Passive exposure: Never    Smokeless tobacco: Never   Substance Use Topics    Alcohol use: Not Currently     Alcohol/week: 0.0 standard drinks of alcohol    Drug use: Never    Still living at home with mom and extended family.     Past medical history and social history were reviewed.    Physical Examination:  There were no vitals taken for this visit.     Wt Readings from Last 10 Encounters:   11/01/23 65.9 kg (145 lb 4.8 oz)   10/27/23 68 kg (150 lb)   10/13/23 70.3 kg (155 lb)   10/12/23 66.9 kg (147 lb 6.4 oz)   10/02/23 66.6 kg (146 lb 12.8 oz)   09/22/23 68 kg (150 lb)   09/08/23 65.4 kg (144 lb 3.2 oz)   08/11/23 65.5 kg (144 lb 4.8 oz)   07/14/23 65.2 kg (143 lb 12.8 oz)   07/10/23 65.3 kg (144 lb)     General: Pleasant female, NAD, smiling and happy today  Eyes: EOMI, PERRL. Mild. scleral icterus.  Respiratory: Normal respiratory effort.  Ext: No peripheral edema.  MSK: Contractured right arm and hand 2/2 stoke.  Neurologic: Grossly nonfocal. A/O x 4.  Skin: No rashes, petechiae, or bruising noted on exposed skin. Port accessed in left chest    Laboratory Data:  Recent Results (from the past 240 hour(s))   Extra Blue Top Tube    Collection Time: 10/27/23  8:13 AM   Result Value Ref Range    Hold Specimen [facility]    Extra Red Top Tube    Collection Time:  10/27/23  8:13 AM   Result Value Ref Range    Hold Specimen [facility]    Extra Green Top (Lithium Heparin) Tube    Collection Time: 10/27/23  8:13 AM   Result Value Ref Range    Hold Specimen JI    Extra Purple Top Tube    Collection Time: 10/27/23  8:13 AM   Result Value Ref Range    Hold Specimen [facility]    Basic metabolic panel    Collection Time: 10/27/23  8:13 AM   Result Value Ref Range    Sodium 140 135 - 145 mmol/L    Potassium 4.2 3.4 - 5.3 mmol/L    Chloride 107 98 - 107 mmol/L    Carbon Dioxide (CO2) 22 22 - 29 mmol/L    Anion Gap 11 7 - 15 mmol/L    Urea Nitrogen 11.7 6.0 - 20.0 mg/dL    Creatinine 0.75 0.51 - 0.95 mg/dL    GFR Estimate >90 >60 mL/min/1.73m2    Calcium 8.9 8.6 - 10.0 mg/dL    Glucose 91 70 - 99 mg/dL   CBC with platelets and differential    Collection Time: 10/27/23  8:13 AM   Result Value Ref Range    WBC Count 14.1 (H) 4.0 - 11.0 10e3/uL    RBC Count 2.32 (L) 3.80 - 5.20 10e6/uL    Hemoglobin 7.1 (L) 11.7 - 15.7 g/dL    Hematocrit 20.8 (L) 35.0 - 47.0 %    MCV 90 78 - 100 fL    MCH 30.6 26.5 - 33.0 pg    MCHC 34.1 31.5 - 36.5 g/dL    RDW 28.0 (H) 10.0 - 15.0 %    Platelet Count 398 150 - 450 10e3/uL    % Neutrophils 70 %    % Lymphocytes 15 %    % Monocytes 6 %    % Eosinophils 3 %    % Basophils 2 %    % Immature Granulocytes 4 %    NRBCs per 100 WBC 5 (H) <1 /100    Absolute Neutrophils 9.8 (H) 1.6 - 8.3 10e3/uL    Absolute Lymphocytes 2.1 0.8 - 5.3 10e3/uL    Absolute Monocytes 0.9 0.0 - 1.3 10e3/uL    Absolute Eosinophils 0.5 0.0 - 0.7 10e3/uL    Absolute Basophils 0.3 (H) 0.0 - 0.2 10e3/uL    Absolute Immature Granulocytes 0.5 (H) <=0.4 10e3/uL    Absolute NRBCs 0.7 10e3/uL   EKG 12-lead, tracing only    Collection Time: 11/01/23  7:04 PM   Result Value Ref Range    Systolic Blood Pressure  mmHg    Diastolic Blood Pressure  mmHg    Ventricular Rate 101 BPM    Atrial Rate 101 BPM    OR Interval 142 ms    QRS Duration 76 ms     ms    QTc 482 ms    P Axis 67 degrees    R AXIS 58 degrees     T Axis -12 degrees    Interpretation ECG       Sinus tachycardia  Nonspecific T wave abnormality  Abnormal ECG     Comprehensive metabolic panel    Collection Time: 11/01/23  7:27 PM   Result Value Ref Range    Sodium      Potassium      Carbon Dioxide (CO2)      Anion Gap      Urea Nitrogen      Creatinine      GFR Estimate      Calcium      Chloride      Glucose      Alkaline Phosphatase      AST      ALT      Protein Total      Albumin      Bilirubin Total     Reticulocyte count    Collection Time: 11/01/23  7:27 PM   Result Value Ref Range    % Reticulocyte 21.7 (H) 0.5 - 2.0 %    Absolute Reticulocyte 0.338 (H) 0.025 - 0.095 10e6/uL   CBC with platelets and differential    Collection Time: 11/01/23  7:27 PM   Result Value Ref Range    WBC Count      RBC Count      Hemoglobin      Hematocrit      MCV      MCH      MCHC      RDW      Platelet Count      % Neutrophils      % Lymphocytes      % Monocytes      % Eosinophils      % Basophils      % Immature Granulocytes      Absolute Neutrophils      Absolute Lymphocytes      Absolute Monocytes      Absolute Eosinophils      Absolute Basophils      Absolute Immature Granulocytes     Comprehensive metabolic panel    Collection Time: 11/01/23  8:40 PM   Result Value Ref Range    Sodium 137 135 - 145 mmol/L    Potassium 3.5 3.4 - 5.3 mmol/L    Carbon Dioxide (CO2) 24 22 - 29 mmol/L    Anion Gap 7 7 - 15 mmol/L    Urea Nitrogen 9.1 6.0 - 20.0 mg/dL    Creatinine 0.55 0.51 - 0.95 mg/dL    GFR Estimate >90 >60 mL/min/1.73m2    Calcium 8.1 (L) 8.6 - 10.0 mg/dL    Chloride 106 98 - 107 mmol/L    Glucose 95 70 - 99 mg/dL    Alkaline Phosphatase 59 35 - 104 U/L    AST 49 (H) 0 - 45 U/L    ALT 26 0 - 50 U/L    Protein Total 6.7 6.4 - 8.3 g/dL    Albumin 4.2 3.5 - 5.2 g/dL    Bilirubin Total 3.6 (H) <=1.2 mg/dL   CBC with platelets and differential    Collection Time: 11/01/23  8:40 PM   Result Value Ref Range    WBC Count 14.6 (H) 4.0 - 11.0 10e3/uL    RBC Count 2.17 (L)  3.80 - 5.20 10e6/uL    Hemoglobin 7.0 (L) 11.7 - 15.7 g/dL    Hematocrit 19.6 (L) 35.0 - 47.0 %    MCV 90 78 - 100 fL    MCH 32.3 26.5 - 33.0 pg    MCHC 35.7 31.5 - 36.5 g/dL    RDW 24.4 (H) 10.0 - 15.0 %    Platelet Count 297 150 - 450 10e3/uL    % Neutrophils 73 %    % Lymphocytes 17 %    % Monocytes 7 %    % Eosinophils 2 %    % Basophils 1 %    % Immature Granulocytes 0 %    NRBCs per 100 WBC 2 (H) <1 /100    Absolute Neutrophils 10.6 (H) 1.6 - 8.3 10e3/uL    Absolute Lymphocytes 2.5 0.8 - 5.3 10e3/uL    Absolute Monocytes 1.0 0.0 - 1.3 10e3/uL    Absolute Eosinophils 0.3 0.0 - 0.7 10e3/uL    Absolute Basophils 0.2 0.0 - 0.2 10e3/uL    Absolute Immature Granulocytes 0.1 <=0.4 10e3/uL    Absolute NRBCs 0.3 10e3/uL   hCG qual urine POCT    Collection Time: 11/01/23 10:16 PM   Result Value Ref Range    HCG Qual Urine Negative Negative    Internal QC Check POCT Valid Valid    POCT Kit Lot Number [id number]     POCT Kit Expiration Date 05/29/2025 9/26/23 Ferriscan  MRI FOR LIVER IRON CONCENTRATION (FERRISCAN)     CLINICAL HISTORY: Iron overload due to repeated blood cell transfusions, sickle cell disease with iron overload. Needs interval Ferriscan.     TECHNIQUE: Sequential non-contrast spin-echo MRI imaging obtained of the liver with TR 2500 msec and progressively longer Miley up to 18 msec.     FINDINGS:  Average liver iron concentration is 28.5 mg/g dry tissue (normal = 0.17 - 1.8), previously 31.6 mg/g dry tissue.  Average liver iron concentration is 510 mmol/kg dry tissue (normal = 3 - 33), previously 566 mmol/kg dry tissue.          Assessment and Plan:  1. Sickle Cell HgbSS Disease  2. Acute pain crises  3. Chronic Pain  4. 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 History of CVA  7. Hearing loss    Jennifer continues to make progress with her oxycodone taper. She has now reached 10 tabs a week, so I think the next step is to reduce dosing. She remains  hesitant to completely discontinue the oxycodone.  Alternatively, she could further extend her tablets by more than 1 week when she gets down to around 10 tablets per fill.  She is feeling very optimistic about her job and relationships.     She did ask She has had 3 hospital admissions and 4 additional ED visits so far this calendar year. Thus, I am open to offering IV Dilaudid (for now). I told her this may change in the future. Despite intermittent influxes in pain following sidney infusions, she believes these are helpful in reducing pain crises. We will continue monthly infusions.    Desferal is currently on hold due to desire to stop/hold infusion due to decreased quality of life related to the amount of time she needed to be connected for infusion. Remains on Jadenu. Her Ferriscan did show improvement from last year. We do need to make sure she gets back into audiology and ophthalmology annually due to chelator      Plan:  -Continue Hydrea to 3000mg daily to help lessen frequency of sickle cell pain.   -She has resumed crizanlizumab infusions which we will continue monthly  -Continue slow taper of oxycodone. Stay at 15 mg tablets, 10 per week. She can self-reduce infusion days/week and we will continue to keep the cap at 2/week for now.  -Continue infusion center visits limited to two times per week (Mondays and Fridays ideally although still needs to call to request). Continue diligent home management with current medications, heat, rest, compression, warm baths.   -If unable to manage at home can go to ED we will trial IV Dilaudid and take a break from ketamine (10/2/23). No PCA use and goal for any admission would still be to discharge by 5 days or less  - Desferal infusions on hold. Continue Jadenu. Repeat Ferriscan in 4-6 months   -Continue aspirin BID  -RTC with Patricia in 1 month, coordinate with sidney infusions. Got flu shot today but declined COVID. I have recommended that Patricia readdress COVID vaccine at  "next visit.    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 She had multiple thromboembolic events in 2021 despite adherent anticoagulation use (though warfarin was perpetually low) and there are concerns for chronic thromboembolic disease but did not have pulmonary HTN on a November 2021 cath. She is maintained on chronic PO opioids and twice-weekly infusion visits (since 1/24/22) but has been able to be maintained on this regimen and has stayed out of the ED most of the time with even rarer admissions.     Interval History:  ***    Sickle Cell Disease Comprehensive Checklist  Bone Health/Avascular Necrosis Screening/Symptoms (each visit): no new concerns today  Leg Ulcer evaluation (every visit): none  Hypertension (every visit):stable 3/10/23  Last pulmonary evaluation (asthma, AMAN, pulm HTN): 9/28/22  Stroke/silent cerebral infarct Hx (Y/N): Yes TIA ~2014, first event ~age 1 with full stroke and R sided weakness  Last PCP Visit: 3/6/23  Vaccines:  PCV13: 5/13/19  Pneumovax (PPSV23): 3/04, 10/09, 7/12/19 (next due 7/2024)  Menactra: 4/2010, 9/2015 (MCV done 8/16/21)  Influenza: 11/17/2022   Audiology (chelation): done 6/2020, normal.. However, on 6/7, \"While there is no significant change in grade on the CTCAE 4.03 Scale, there were hearing changes bilaterally for both standard and extended high frequency audiometry.  Will need to " "determine if an ENT consult is advised due to the asymmetry in extended high frequency testing.\"     Plan last reviewed with patient: 10/2/23    Patient background: 22 yo F, enjoys movies and kids though there are times where she does not really want to talk to people. Does not have a lot of social support at home.     Sickle Cell Disease History  Primary Hematologist Team: Jose Rafael Duncan  PCP: none  Genotype: SS  Acute Pain Crisis Treatment: (limiting IV   ER   Dilaudid 1 mg IV q1h up to 3 doses  Toradol 30 mg IV x1   Maintenance IV fluids with LR  Other: Zofran 8 mg IV PRN nausea  Inpatient:  PCA Dilaudid 1 hr q30 minutes, no basal rate  Toradol 30 mg x6h x 4 hr  LR maintenance x 1-2 days  Other Medications: Zofran  ASA  Supportive Care: Docusate, Senna  Chronic Pain Medications:    Home regimen: oxycodone 15 mg p.o. q.4-6 hours p.r.n. breakthrough pain.  She also continues on Voltaren gel, and Zoloft among other medications.    -Also benefits from mental health visits, acupuncture  Baseline Hemoglobin: 7 g/dl without chronic transfusions  Hydroxyurea use: Yes  H/O blood transfusions: Yes, several (iron overload) Most recent 11/20/2021  H/O Transfusion Reactions: no  Antibodies:none  H/O Acute Chest Syndrome: Yes  Last episode:9/05/22 (previously 4/26/21, 10/2019)   ICU/intubation: not with 9/2022 admission  H/O Stroke: Yes (managed with chronic transfusions in the past, stopped late Spring 2020)  H/O VTE: Yes (2/2021)  H/O Cholecystectomy or Splenectomy: no  H/O Asthma, Pulm HTN, AVN, Leg Ulcers, Nephropathy, Retinopathy, etc: Iron overload, asthma, chronic lung disease, physical limitations from early stroke    ---------------------------------------  Jennifer Cervantes's Goals (discussed today, 11/3/23)    1-3 month goal:  Reach a year with her boyfriend (achieved!)    6 month goal:  Save money for ultimate goal of moving out    12 month goal:  Move into her own place     Disease-specific goal(s):  Continue to wean " oxycodone down, next with reduced doses. Minimize infusion center visits.  ---------------------------------------      Current Outpatient Medications   Medication Sig Dispense Refill     acetaminophen (TYLENOL) 325 MG tablet Take 2 tablets (650 mg) by mouth every 6 hours as needed for mild pain 120 tablet 3     albuterol (PROAIR HFA/PROVENTIL HFA/VENTOLIN HFA) 108 (90 Base) MCG/ACT inhaler Inhale 2 puffs into the lungs every 6 hours as needed for shortness of breath or wheezing 8.5 g 3     albuterol (PROVENTIL) (2.5 MG/3ML) 0.083% neb solution Take 2 vials (5 mg) by nebulization every 6 hours as needed for shortness of breath or wheezing 90 mL 3     aspirin (ASA) 81 MG chewable tablet Take 1 tablet (81 mg) by mouth 2 times daily 60 tablet 11     budesonide-formoterol (SYMBICORT) 160-4.5 MCG/ACT Inhaler Inhale 2 puffs into the lungs 2 times daily 10.2 g 3     cetirizine (ZYRTEC) 10 MG tablet Take 1 tablet (10 mg) by mouth daily 30 tablet 1     deferasirox (JADENU) 360 MG tablet Take 4 tablets (1,440 mg) by mouth every evening 120 tablet 4     diphenhydrAMINE (BENADRYL) 25 MG capsule Take 1 capsule (25 mg) by mouth every 6 hours as needed for itching or allergies 30 capsule 0     EPINEPHrine (ANY BX GENERIC EQUIV) 0.3 MG/0.3ML injection 2-pack Inject 0.3 mLs (0.3 mg) into the muscle as needed for anaphylaxis 1 each 1     Hydroxyurea 1000 MG TABS Take 3,000 mg by mouth daily 90 tablet 3     methocarbamol (ROBAXIN) 750 MG tablet Take 1 tablet (750 mg) by mouth 4 times daily as needed for muscle spasms (during sickle pain crises. Okay to take scheduled while in pain) 60 tablet 1     naloxone (NARCAN) 4 MG/0.1ML nasal spray Spray 4 mg into one nostril alternating nostrils as needed for opioid reversal every 2-3 minutes until assistance arrives       ondansetron (ZOFRAN) 8 MG tablet Take 1 tablet (8 mg) by mouth every 8 hours as needed 30 tablet 1     oxyCODONE IR (ROXICODONE) 15 MG tablet Take 1 tablet (15 mg) by mouth  every 4 hours as needed for severe pain or breakthrough pain Goal 4 per day. Max 6 per day. 25 tablet 0     predniSONE (DELTASONE) 20 MG tablet Take 1 tablet (20 mg) by mouth daily 3 tablet 0       Past Medical History  Past Medical History:   Diagnosis Date     Anxiety      Bleeding disorder (H24)      Blood clotting disorder (H24)      Cerebral infarction (H) 2015     Cognitive developmental delay     low IQ. Please recognize when managing pain and planning with her     Depressive disorder      Hemiplegia and hemiparesis following cerebral infarction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

## 2023-11-03 NOTE — NURSING NOTE
"Oncology Rooming Note    November 3, 2023 10:29 AM   Jennifer Cervantes is a 23 year old female who presents for:    Chief Complaint   Patient presents with    Port Draw     Power needle. Heparin locked,vitals checked    Oncology Clinic Visit     Sickle cell pain crisis     Initial Vitals: /67   Pulse 89   Temp 97.9  F (36.6  C)   Resp 16   SpO2 95%  Estimated body mass index is 24.94 kg/m  as calculated from the following:    Height as of 10/27/23: 1.626 m (5' 4\").    Weight as of 11/1/23: 65.9 kg (145 lb 4.8 oz). There is no height or weight on file to calculate BSA.  Extreme Pain (9) Comment: Data Unavailable   No LMP recorded. Patient has had an implant.  Allergies reviewed: Yes  Medications reviewed: Yes    Medications: MEDICATION REFILLS NEEDED TODAY. Provider was notified.  Pharmacy name entered into EPIC: [facility] [place], [place] - [address] [place] [address] [phone]    Clinical concerns: aspirin, oxycodone, hydroxyurea, deferasirox        Jake Hill              "

## 2023-11-03 NOTE — TELEPHONE ENCOUNTER
Oncology Nurse Triage - Sickle Cell Pain Crisis:    Situation: Jennifer  calling about Sickle Cell Pain Crisis, requesting to be added on for IV fluids and pain medicine    Background:     Patient's last infusion was 10/30/23, 11/1/23 was in ER   Last clinic visit date:10/2/23 Dr Duncan   Does patient have active treatment plan?  Yes      Assessment of Symptoms:  Onset/Duration of symptoms: 2 day    Is it typical sickle cell pain? Yes   Location: lower back   Character: Sharp           Intensity: 8/10    Any radiation of pain, numbness, tingling, weakness, warmth, swelling, discoloration of arms or legs?No     Fever?No  (if yes max temperature recorded in last 24 hours):      Chest Pain Present: No     Shortness of breath: No     Other home therapies tried: HEAT/HEATING PAD and WARM BATH     Last home medication taken and when: 6:00am oxycodone and muscle relaxer     Any Refills Needed?: No     Does patient have transportation & length of time to get to clinic: Yes has own ride because she has appointments scheduled.         Recommendations:     Would like IVF/PM added to sidney infusion   OK per Patricia Mantilla to add   Spoke to infusion nurse Kelsey and it is oK to add on IVF/PM to sidney infusion today.   Call placed to Jennifer and updated her that it was OK to add IVF/PM to sidney today.     If you do not hear from the infusion center by 2pm then you will not be able to get in for an infusion today. If symptoms worsen while waiting for call back, and/or you experience fever, chills, SOB, chest pain, cough, n/v, dizziness, numbness, swelling, discoloration of extremities, then seek emergency evaluation in Emergency Department.     Please note, if you are late for your appt, you risk losing your infusion appt as it may delay another patient's infusion who arrived on time.

## 2023-11-03 NOTE — NURSING NOTE
Chief Complaint   Patient presents with    Port Draw     Power needle. Heparin locked,vitals checked     Elena Mcghee RN on 11/3/2023 at 10:16 AM

## 2023-11-03 NOTE — PATIENT INSTRUCTIONS
[facility] Triage and after hours / weekends / holidays:  [phone]    Please call the triage or after hours line if you experience a temperature greater than or equal to 100.4, shaking chills, have uncontrolled nausea, vomiting and/or diarrhea, dizziness, shortness of breath, chest pain, bleeding, unexplained bruising, or if you have any other new/concerning symptoms, questions or concerns.      If you are having any concerning symptoms or wish to speak to a provider before your next infusion visit, please call triage to notify them so we can adequately serve you.     If you need a refill on a narcotic prescription or other medication, please call before your infusion appointment.                November 2023 Sunday Monday Tuesday Wednesday Thursday Friday Saturday                  1    Admission   6:48 PM   Jose Eduardo Rush MD   [facility] Emergency Department   (Discharge: 11/2/2023)    XR CHEST 2 VIEWS  10:05 PM   (20 min.)   [facility]3   [facility] Imaging 2     3    LAB CENTRAL  10:00 AM   (15 min.)   [facility] DRAW   [facility]    RETURN ACTIVE TREATMENT  10:15 AM   (45 min.)   Patricia Mantilla CNP   [facility]    ONC INFUSION 4 HR (240 MIN)  11:00 AM   (240 min.)    ONC INFUSION NURSE   [facility] 4       5     6     7     8     9     10     11       12     13     14     15     16     17     18       19     20     21     22     23     24     25       26     27     28     29     30 December 2023 Sunday Monday Tuesday Wednesday Thursday Friday Saturday                            1    LAB CENTRAL  10:30 AM   (15 min.)   [facility] DRAW   [facility]    ONC INFUSION 4 HR (240 MIN)  11:00 AM   (240 min.)    ONC INFUSION NURSE   [facility] 2       3     4     5     6     7     8     9        10     11     12     13     14     15     16       17     18     19     20     21     22     23       24     25     26     27     28    LAB CENTRAL  11:00 AM   (15 min.)   [facility] LAB DRAW   [facility]    RETURN ACTIVE TREATMENT  11:15 AM   (45 min.)   Patricia Mantilla CNP   [facility]    ONC INFUSION 4 HR (240 MIN)  12:30 PM   (240 min.)    ONC INFUSION NURSE   [facility] 29     30       31                                                  Recent Results (from the past 24 hour(s))   Basic metabolic panel    Collection Time: 11/03/23 10:10 AM   Result Value Ref Range    Sodium 140 135 - 145 mmol/L    Potassium 4.0 3.4 - 5.3 mmol/L    Chloride 108 (H) 98 - 107 mmol/L    Carbon Dioxide (CO2) 22 22 - 29 mmol/L    Anion Gap 10 7 - 15 mmol/L    Urea Nitrogen 7.6 6.0 - 20.0 mg/dL    Creatinine 0.47 (L) 0.51 - 0.95 mg/dL    GFR Estimate >90 >60 mL/min/1.73m2    Calcium 8.8 8.6 - 10.0 mg/dL    Glucose 90 70 - 99 mg/dL   Reticulocyte count    Collection Time: 11/03/23 10:10 AM   Result Value Ref Range    % Reticulocyte 22.5 (H) 0.5 - 2.0 %    Absolute Reticulocyte 0.498 (H) 0.025 - 0.095 10e6/uL   Ferritin    Collection Time: 11/03/23 10:10 AM   Result Value Ref Range    Ferritin 32 6 - 175 ng/mL   CBC with platelets and differential    Collection Time: 11/03/23 10:10 AM   Result Value Ref Range    WBC Count 10.4 4.0 - 11.0 10e3/uL    RBC Count 2.22 (L) 3.80 - 5.20 10e6/uL    Hemoglobin 7.0 (L) 11.7 - 15.7 g/dL    Hematocrit 19.3 (L) 35.0 - 47.0 %    MCV 87 78 - 100 fL    MCH 31.5 26.5 - 33.0 pg    MCHC 36.3 31.5 - 36.5 g/dL    RDW 23.8 (H) 10.0 - 15.0 %    Platelet Count 363 150 - 450 10e3/uL    % Neutrophils 65 %    % Lymphocytes 19 %    % Monocytes 9 %    % Eosinophils 4 %    % Basophils 2 %    % Immature Granulocytes 1 %    NRBCs per 100 WBC 2 (H) <1 /100    Absolute Neutrophils 6.8 1.6 - 8.3 10e3/uL    Absolute Lymphocytes 2.0 0.8  - 5.3 10e3/uL    Absolute Monocytes 0.9 0.0 - 1.3 10e3/uL    Absolute Eosinophils 0.4 0.0 - 0.7 10e3/uL    Absolute Basophils 0.2 0.0 - 0.2 10e3/uL    Absolute Immature Granulocytes 0.1 <=0.4 10e3/uL    Absolute NRBCs 0.2 10e3/uL

## 2023-11-03 NOTE — PROGRESS NOTES
Infusion Nursing Note:  Jennifer Cervantes presents today for Jr + IVF + Pain meds.    Patient seen by provider today: Yes: Patricia Mantilla CNP   present during visit today: Not Applicable.    Note: Pt presents to infusion today feeling well. Pt offers no new concerns following visit with provider today.    Pt presents to infusion with 9/10 sickle cell pain mostly in her back. After 1L of fluids, dilaudid x3, toradol, zofran and benadryl pt rating pain 3/10. Pt stated she felt comfortable discharging home at this time.    Intravenous Access:  Implanted Port.    Treatment Conditions:  Lab Results   Component Value Date    HGB 7.0 (L) 11/03/2023    WBC 10.4 11/03/2023    ANEU 8.0 10/21/2023    ANEUTAUTO 6.8 11/03/2023     11/03/2023        Lab Results   Component Value Date     11/03/2023    POTASSIUM 4.0 11/03/2023    MAG 2.0 11/11/2021    CR 0.47 (L) 11/03/2023    MICAH 8.8 11/03/2023    BILITOTAL 3.6 (H) 11/01/2023    ALBUMIN 4.2 11/01/2023    ALT 26 11/01/2023    AST 49 (H) 11/01/2023       Results reviewed, labs MET treatment parameters, ok to proceed with treatment.      Post Infusion Assessment:  Patient tolerated infusion without incident.  Blood return noted pre and post infusion.  Site patent and intact, free from redness, edema or discomfort.  No evidence of extravasations.  Access discontinued per protocol.       Discharge Plan:   Patient received prescription refills for Hydroxyurea, Aspirin, Oxycodone and Jadenu.  Discharge instructions reviewed with: Patient.  Patient and/or family verbalized understanding of discharge instructions and all questions answered.  AVS to patient via [employer].  Patient will return PRN for next appointment.   Patient discharged in stable condition accompanied by: self.  Departure Mode: Ambulatory.      Janice Shelley RN

## 2023-11-06 ENCOUNTER — INFUSION THERAPY VISIT (OUTPATIENT)
Dept: ONCOLOGY | Facility: CLINIC | Age: 23
End: 2023-11-06
Attending: PEDIATRICS
Payer: COMMERCIAL

## 2023-11-06 ENCOUNTER — PATIENT OUTREACH (OUTPATIENT)
Dept: CARE COORDINATION | Facility: CLINIC | Age: 23
End: 2023-11-06
Payer: COMMERCIAL

## 2023-11-06 ENCOUNTER — NURSE TRIAGE (OUTPATIENT)
Dept: ONCOLOGY | Facility: CLINIC | Age: 23
End: 2023-11-06
Payer: COMMERCIAL

## 2023-11-06 VITALS
SYSTOLIC BLOOD PRESSURE: 130 MMHG | TEMPERATURE: 98.2 F | DIASTOLIC BLOOD PRESSURE: 80 MMHG | OXYGEN SATURATION: 95 % | HEART RATE: 89 BPM

## 2023-11-06 DIAGNOSIS — D57.00 SICKLE CELL PAIN CRISIS (H): Primary | ICD-10-CM

## 2023-11-06 DIAGNOSIS — G81.10 SPASTIC HEMIPLEGIA, UNSPECIFIED ETIOLOGY, UNSPECIFIED LATERALITY (H): ICD-10-CM

## 2023-11-06 PROCEDURE — 96376 TX/PRO/DX INJ SAME DRUG ADON: CPT

## 2023-11-06 PROCEDURE — 250N000013 HC RX MED GY IP 250 OP 250 PS 637: Performed by: PEDIATRICS

## 2023-11-06 PROCEDURE — 250N000011 HC RX IP 250 OP 636: Performed by: PEDIATRICS

## 2023-11-06 PROCEDURE — 258N000003 HC RX IP 258 OP 636: Performed by: PEDIATRICS

## 2023-11-06 PROCEDURE — 96375 TX/PRO/DX INJ NEW DRUG ADDON: CPT

## 2023-11-06 PROCEDURE — 96374 THER/PROPH/DIAG INJ IV PUSH: CPT

## 2023-11-06 PROCEDURE — 96361 HYDRATE IV INFUSION ADD-ON: CPT

## 2023-11-06 RX ORDER — ONDANSETRON 4 MG/1
8 TABLET, FILM COATED ORAL
Status: CANCELLED
Start: 2024-01-01

## 2023-11-06 RX ORDER — HEPARIN SODIUM (PORCINE) LOCK FLUSH IV SOLN 100 UNIT/ML 100 UNIT/ML
5 SOLUTION INTRAVENOUS
Status: DISCONTINUED | OUTPATIENT
Start: 2023-11-06 | End: 2023-11-06 | Stop reason: HOSPADM

## 2023-11-06 RX ORDER — HEPARIN SODIUM (PORCINE) LOCK FLUSH IV SOLN 100 UNIT/ML 100 UNIT/ML
5 SOLUTION INTRAVENOUS
Status: CANCELLED | OUTPATIENT
Start: 2024-01-01

## 2023-11-06 RX ORDER — KETOROLAC TROMETHAMINE 30 MG/ML
30 INJECTION, SOLUTION INTRAMUSCULAR; INTRAVENOUS ONCE
Status: COMPLETED | OUTPATIENT
Start: 2023-11-06 | End: 2023-11-06

## 2023-11-06 RX ORDER — KETOROLAC TROMETHAMINE 30 MG/ML
30 INJECTION, SOLUTION INTRAMUSCULAR; INTRAVENOUS ONCE
Status: CANCELLED
Start: 2024-01-01 | End: 2024-01-01

## 2023-11-06 RX ORDER — ONDANSETRON 8 MG/1
8 TABLET, ORALLY DISINTEGRATING ORAL
Status: COMPLETED | OUTPATIENT
Start: 2023-11-06 | End: 2023-11-06

## 2023-11-06 RX ORDER — DIPHENHYDRAMINE HCL 25 MG
25 CAPSULE ORAL
Status: COMPLETED | OUTPATIENT
Start: 2023-11-06 | End: 2023-11-06

## 2023-11-06 RX ORDER — HEPARIN SODIUM,PORCINE 10 UNIT/ML
5 VIAL (ML) INTRAVENOUS
Status: CANCELLED | OUTPATIENT
Start: 2024-01-01

## 2023-11-06 RX ORDER — DIPHENHYDRAMINE HCL 25 MG
25 CAPSULE ORAL
Status: CANCELLED
Start: 2024-01-01

## 2023-11-06 RX ADMIN — HYDROMORPHONE HYDROCHLORIDE 1 MG: 1 INJECTION, SOLUTION INTRAMUSCULAR; INTRAVENOUS; SUBCUTANEOUS at 12:43

## 2023-11-06 RX ADMIN — KETOROLAC TROMETHAMINE 30 MG: 30 INJECTION, SOLUTION INTRAMUSCULAR; INTRAVENOUS at 11:47

## 2023-11-06 RX ADMIN — Medication 5 ML: at 13:56

## 2023-11-06 RX ADMIN — SODIUM CHLORIDE, POTASSIUM CHLORIDE, SODIUM LACTATE AND CALCIUM CHLORIDE 1000 ML: 600; 310; 30; 20 INJECTION, SOLUTION INTRAVENOUS at 11:42

## 2023-11-06 RX ADMIN — DIPHENHYDRAMINE HYDROCHLORIDE 25 MG: 25 CAPSULE ORAL at 11:45

## 2023-11-06 RX ADMIN — HYDROMORPHONE HYDROCHLORIDE 1 MG: 1 INJECTION, SOLUTION INTRAMUSCULAR; INTRAVENOUS; SUBCUTANEOUS at 13:44

## 2023-11-06 RX ADMIN — HYDROMORPHONE HYDROCHLORIDE 1 MG: 1 INJECTION, SOLUTION INTRAMUSCULAR; INTRAVENOUS; SUBCUTANEOUS at 11:42

## 2023-11-06 RX ADMIN — ONDANSETRON 8 MG: 8 TABLET, ORALLY DISINTEGRATING ORAL at 11:45

## 2023-11-06 ASSESSMENT — PAIN SCALES - GENERAL: PAINLEVEL: EXTREME PAIN (9)

## 2023-11-06 NOTE — PROGRESS NOTES
Social Work - Transportation  [facility]    Data/Intervention:  Patient Name: Jennifer Cervantes Goes By: Jennifer    /Age: 2000 (23 year old)    [facility]  Reason for Referral:  support requested for patient transportation needs for today's appointment.  Assessment:  called [employer] to arrange ride through patient's insurance. [employer] arranged  for patient from home with [employer]. Patient will need to call [phone] when ready for return ride home.  Plan: Patient is aware of the transportation plan.  available to assist with any other needs.    CARLOS Chavez,UDAY  Hematology/Oncology Social Worker  Phone:[phone] Pager: [phone]

## 2023-11-06 NOTE — TELEPHONE ENCOUNTER
0746 Available appt with [facility] at 1130    0738 Call to pt to confirm they are able to make appt. Pt confirmed.     0751 CCOD and SW notified to assist with scheduling and transportation.

## 2023-11-06 NOTE — PATIENT INSTRUCTIONS
[facility] Triage and after hours / weekends / holidays:  [phone]    Please call the triage or after hours line if you experience a temperature greater than or equal to 100.4, shaking chills, have uncontrolled nausea, vomiting and/or diarrhea, dizziness, shortness of breath, chest pain, bleeding, unexplained bruising, or if you have any other new/concerning symptoms, questions or concerns.      If you are having any concerning symptoms or wish to speak to a provider before your next infusion visit, please call triage to notify them so we can adequately serve you.     If you need a refill on a narcotic prescription or other medication, please call before your infusion appointment.           November 2023 Sunday Monday Tuesday Wednesday Thursday Friday Saturday                  1    Admission   6:48 PM   Jose Eduardo Rush MD   [facility] Emergency Department   (Discharge: 11/2/2023)    XR CHEST 2 VIEWS  10:05 PM   (20 min.)   [facility]3   [facility] Imaging 2     3    LAB CENTRAL  10:00 AM   (15 min.)   [facility]   [facility]    RETURN ACTIVE TREATMENT  10:15 AM   (45 min.)   Patricia Mantilla CNP   [facility]    ONC INFUSION 4 HR (240 MIN)  11:00 AM   (240 min.)   [facility] ONC INFUSION NURSE   [facility] 4       5     6    ONC INFUSION 2 HR (120 MIN)  11:30 AM   (120 min.)   [facility] ONC INFUSION NURSE   [facility] 7     8     9     10     11       12     13     14     15     16     17     18       19     20     21     22     23     24     25       26     27     28     29     30 December 2023 Sunday Monday Tuesday Wednesday Thursday Friday Saturday                            1    LAB CENTRAL  10:30 AM   (15 min.)   [facility]   [facility]    ONC INFUSION 4 HR (240 MIN)  11:00 AM   (240 min.)   [facility] ONC  INFUSION NURSE   [facility] 2       3     4     5     6     7     8     9       10     11     12     13     14     15     16       17     18     19     20     21     22     23       24     25     26     27     28    LAB CENTRAL  11:00 AM   (15 min.)   [facility] LAB DRAW   [facility]    RETURN ACTIVE TREATMENT  11:15 AM   (45 min.)   Patricia Mantilla CNP   [facility]    ONC INFUSION 4 HR (240 MIN)  12:30 PM   (240 min.)    ONC INFUSION NURSE   [facility] 29     30       31                                                No results found for this or any previous visit (from the past 24 hour(s)).

## 2023-11-06 NOTE — TELEPHONE ENCOUNTER
"Oncology Nurse Triage - Sickle Cell Pain Crisis:    Situation: Jennifer  calling about Sickle Cell Pain Crisis, requesting to be added on for IV fluids and pain medicine    Background:     Patient's last infusion was 11/03/23  Last clinic visit date:11/03/23 w/Patricia Mantilla  Does patient have active treatment plan?  Yes      Assessment of Symptoms:  Onset/Duration of symptoms: 1 day    Is it typical sickle cell pain? Yes   Location: \"Generalized\"  Character: Sharp           Intensity: 9/10    Any radiation of pain, numbness, tingling, weakness, warmth, swelling, discoloration of arms or legs?No     Fever?No  (if yes max temperature recorded in last 24 hours):      Chest Pain Present: No     Shortness of breath: No     Other home therapies tried: HEAT/HEATING PAD and WARM BATH     Last home medication taken and when: 0600 oxycodone, ibuprofen, muscle relaxer     Any Refills Needed?: No     Does patient have transportation & length of time to get to clinic: Pt states if there is an opening in the next 30 minutes she will not need transportation. However, if something opens up later then she will need help with transportation.         Recommendations:   Added to infusion wait list for IVF/pain meds per protocol.      If you do not hear from the infusion center by 2pm then you will not be able to get in for an infusion today. If symptoms worsen while waiting for call back, and/or you experience fever, chills, SOB, chest pain, cough, n/v, dizziness, numbness, swelling, discoloration of extremities, then seek emergency evaluation in Emergency Department.     Please note, if you are late for your appt, you risk losing your infusion appt as it may delay another patient's infusion who arrived on time.           "

## 2023-11-06 NOTE — PROGRESS NOTES
Infusion Nursing Note:  Jennifer Cervantes presents today for IV fluids and pain meds.    Patient seen by provider today: No   present during visit today: Not Applicable.    Note: Patient reports generalized pain today 9/10 that started last evening. Received toradol and dilaudid x3 with pain improved to 5/10 prior to discharge.        Intravenous Access:  Implanted Port accessed in infusion    Treatment Conditions:  Not Applicable.      Post Infusion Assessment:  Patient tolerated infusion without incident.  Blood return noted pre and post infusion.  Site patent and intact, free from redness, edema or discomfort.  No evidence of extravasations.  Access discontinued per protocol.       Discharge Plan:   Patient declined prescription refills.  Discharge instructions reviewed with: Patient.  Patient and/or family verbalized understanding of discharge instructions and all questions answered.  AVS to patient via [employer].  Patient will return 12/1/23 for next appointment.   Patient discharged in stable condition accompanied by: self.  Departure Mode: Ambulatory.      Melissa Oliver RN

## 2023-11-07 ENCOUNTER — NURSE TRIAGE (OUTPATIENT)
Dept: ONCOLOGY | Facility: CLINIC | Age: 23
End: 2023-11-07
Payer: COMMERCIAL

## 2023-11-07 NOTE — TELEPHONE ENCOUNTER
Jennifer is calling for two things she would like her care team to help her with:  Oxycodone refill: She states she would like to get refill of 25 tablets but decrease to 10 mg/tablet.  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 She is wondering if someone could explain that to her employer to excuse her from these tasks.     Message routed to Care Team.

## 2023-11-08 ENCOUNTER — PATIENT OUTREACH (OUTPATIENT)
Dept: ONCOLOGY | Facility: CLINIC | Age: 23
End: 2023-11-08
Payer: COMMERCIAL

## 2023-11-08 DIAGNOSIS — D57.00 SICKLE CELL PAIN CRISIS (H): ICD-10-CM

## 2023-11-08 RX ORDER — OXYCODONE HYDROCHLORIDE 10 MG/1
10 TABLET ORAL EVERY 4 HOURS PRN
Qty: 25 TABLET | Refills: 0 | Status: SHIPPED | OUTPATIENT
Start: 2023-11-10 | End: 2023-11-17

## 2023-11-08 NOTE — LETTER
[facility]  [address] [id number] [phone]      11/8/2023    Re: Jennifer Cervantes      TO WHOM IT MAY CONCERN:    Jennifer Cervantes  is a patient of ours that we see for a chronic medical condition.  This condition is exacerbated by changes in tempeture, especially cold, that can cause severe pain.  It would be best for her working environment that she not be near a door that frequently opens and closes.  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      Sincerely,      Arely Krueger RN for Dr David Duncan MD

## 2023-11-08 NOTE — PROGRESS NOTES
[facility]: Cancer Care                                                                                        Letter for work to hopefully excuse her from being near a door that frequently opens and closes, exposing her to cold.        Signature:  Arely Krueger RN

## 2023-11-09 ENCOUNTER — INFUSION THERAPY VISIT (OUTPATIENT)
Dept: TRANSPLANT | Facility: CLINIC | Age: 23
End: 2023-11-09
Payer: COMMERCIAL

## 2023-11-09 ENCOUNTER — NURSE TRIAGE (OUTPATIENT)
Dept: ONCOLOGY | Facility: CLINIC | Age: 23
End: 2023-11-09
Payer: COMMERCIAL

## 2023-11-09 VITALS
OXYGEN SATURATION: 96 % | DIASTOLIC BLOOD PRESSURE: 73 MMHG | HEART RATE: 100 BPM | TEMPERATURE: 98.2 F | SYSTOLIC BLOOD PRESSURE: 116 MMHG | RESPIRATION RATE: 18 BRPM

## 2023-11-09 DIAGNOSIS — D57.00 SICKLE CELL PAIN CRISIS (H): Primary | ICD-10-CM

## 2023-11-09 DIAGNOSIS — G81.10 SPASTIC HEMIPLEGIA, UNSPECIFIED ETIOLOGY, UNSPECIFIED LATERALITY (H): ICD-10-CM

## 2023-11-09 PROCEDURE — 250N000013 HC RX MED GY IP 250 OP 250 PS 637: Performed by: PEDIATRICS

## 2023-11-09 PROCEDURE — 258N000003 HC RX IP 258 OP 636: Performed by: PEDIATRICS

## 2023-11-09 PROCEDURE — 250N000011 HC RX IP 250 OP 636: Performed by: PEDIATRICS

## 2023-11-09 PROCEDURE — 96361 HYDRATE IV INFUSION ADD-ON: CPT

## 2023-11-09 PROCEDURE — 96376 TX/PRO/DX INJ SAME DRUG ADON: CPT

## 2023-11-09 PROCEDURE — 96374 THER/PROPH/DIAG INJ IV PUSH: CPT

## 2023-11-09 PROCEDURE — 96375 TX/PRO/DX INJ NEW DRUG ADDON: CPT

## 2023-11-09 RX ORDER — ONDANSETRON 4 MG/1
8 TABLET, ORALLY DISINTEGRATING ORAL
Status: COMPLETED | OUTPATIENT
Start: 2023-11-09 | End: 2023-11-09

## 2023-11-09 RX ORDER — DIPHENHYDRAMINE HCL 25 MG
25 CAPSULE ORAL
Status: COMPLETED | OUTPATIENT
Start: 2023-11-09 | End: 2023-11-09

## 2023-11-09 RX ORDER — DIPHENHYDRAMINE HCL 25 MG
25 CAPSULE ORAL
Status: CANCELLED
Start: 2024-01-01

## 2023-11-09 RX ORDER — KETOROLAC TROMETHAMINE 30 MG/ML
30 INJECTION, SOLUTION INTRAMUSCULAR; INTRAVENOUS ONCE
Status: COMPLETED | OUTPATIENT
Start: 2023-11-09 | End: 2023-11-09

## 2023-11-09 RX ORDER — KETOROLAC TROMETHAMINE 30 MG/ML
30 INJECTION, SOLUTION INTRAMUSCULAR; INTRAVENOUS ONCE
Status: CANCELLED
Start: 2024-01-01 | End: 2024-01-01

## 2023-11-09 RX ORDER — ONDANSETRON 4 MG/1
8 TABLET, FILM COATED ORAL
Status: CANCELLED
Start: 2024-01-01

## 2023-11-09 RX ORDER — HEPARIN SODIUM,PORCINE 10 UNIT/ML
5 VIAL (ML) INTRAVENOUS
Status: CANCELLED | OUTPATIENT
Start: 2024-01-01

## 2023-11-09 RX ORDER — HEPARIN SODIUM (PORCINE) LOCK FLUSH IV SOLN 100 UNIT/ML 100 UNIT/ML
5 SOLUTION INTRAVENOUS
Status: CANCELLED | OUTPATIENT
Start: 2024-01-01

## 2023-11-09 RX ADMIN — HYDROMORPHONE HYDROCHLORIDE 1 MG: 1 INJECTION, SOLUTION INTRAMUSCULAR; INTRAVENOUS; SUBCUTANEOUS at 09:26

## 2023-11-09 RX ADMIN — SODIUM CHLORIDE, POTASSIUM CHLORIDE, SODIUM LACTATE AND CALCIUM CHLORIDE 1000 ML: 600; 310; 30; 20 INJECTION, SOLUTION INTRAVENOUS at 08:17

## 2023-11-09 RX ADMIN — KETOROLAC TROMETHAMINE 30 MG: 30 INJECTION, SOLUTION INTRAMUSCULAR; INTRAVENOUS at 08:24

## 2023-11-09 RX ADMIN — HYDROMORPHONE HYDROCHLORIDE 1 MG: 1 INJECTION, SOLUTION INTRAMUSCULAR; INTRAVENOUS; SUBCUTANEOUS at 10:23

## 2023-11-09 RX ADMIN — HYDROMORPHONE HYDROCHLORIDE 1 MG: 1 INJECTION, SOLUTION INTRAMUSCULAR; INTRAVENOUS; SUBCUTANEOUS at 08:24

## 2023-11-09 RX ADMIN — DIPHENHYDRAMINE HYDROCHLORIDE 25 MG: 25 CAPSULE ORAL at 10:24

## 2023-11-09 RX ADMIN — ONDANSETRON 8 MG: 8 TABLET, ORALLY DISINTEGRATING ORAL at 10:24

## 2023-11-09 ASSESSMENT — PAIN SCALES - GENERAL: PAINLEVEL: EXTREME PAIN (9)

## 2023-11-09 NOTE — TELEPHONE ENCOUNTER
Oncology Nurse Triage - Sickle Cell Pain Crisis:    Situation: Jennifer  calling about Sickle Cell Pain Crisis, requesting to be added on for IV fluids and pain medicine    Background:     Patient's last infusion was 11/06/23  Last clinic visit date:11/03/23 w/Patricia Mantilla  Does patient have active treatment plan?  Yes      Assessment of Symptoms:  Onset/Duration of symptoms: 1 day    Is it typical sickle cell pain? Yes   Location: lower back  Character: Sharp           Intensity: 10/10    Any radiation of pain, numbness, tingling, weakness, warmth, swelling, discoloration of arms or legs?No     Fever?No  (if yes max temperature recorded in last 24 hours):      Chest Pain Present: No     Shortness of breath: No     Other home therapies tried: HEAT/HEATING PAD and WARM BATH     Last home medication taken and when: Oxycodone and all other medications this morning    Any Refills Needed?: No     Does patient have transportation & length of time to get to clinic: If something is available in the next 0730 minutes she will have a ride; however, she will need help with transportation if its after this time.        Recommendations:     Added to infusion wait list for IVF/pain meds per protocol.      If you do not hear from the infusion center by 2pm then you will not be able to get in for an infusion today. If symptoms worsen while waiting for call back, and/or you experience fever, chills, SOB, chest pain, cough, n/v, dizziness, numbness, swelling, discoloration of extremities, then seek emergency evaluation in Emergency Department.     Please note, if you are late for your appt, you risk losing your infusion appt as it may delay another patient's infusion who arrived on time.

## 2023-11-09 NOTE — PROGRESS NOTES
Infusion Nursing Note:  Jennifer Cervantes presents today for IVF and pain meds.    Patient seen by provider today: No   present during visit today: Not Applicable.    Note: Jennifer here today with 9/10 back pain. Port accessed and pt received 1L LR bolus, total 3mg IV dilaudid, 30mg toradol, 25mg benadryl, and 8mg po zofran. Pain improved to tolerable level on discharge.      Intravenous Access:  Implanted Port.    Treatment Conditions:  Not Applicable.      Post Infusion Assessment:  Patient tolerated infusion without incident.  Blood return noted pre and post infusion.  Site patent and intact, free from redness, edema or discomfort.  No evidence of extravasations.  Access discontinued per protocol.       Discharge Plan:   Patient discharged in stable condition accompanied by: self.  Departure Mode: Ambulatory.      Allison Bowman RN

## 2023-11-09 NOTE — TELEPHONE ENCOUNTER
Available time with BMT if pt is able to arrive within 30 minutes.     0713 Call to pt who confirms she is able to make appt.     0717 CCOD notified to add pt to BMT schedule

## 2023-11-09 NOTE — TELEPHONE ENCOUNTER
"  1.  Reason for the visit:  Consult to discuss excessive daytime sleepiness, snoring and possible sleep apne  2.  Referring provider and clinic name:  Dr. Di Ledezma ENT  3.  Previous Sleep Doctor or Pulmonlogist (clinic name)?  None noted  4.  Records, Procedures, Imaging, and Labs (see below)  No records to obtain        All NOTES from previous office visits that pertain to why they are being seen in the Sleep Center    Previous Sleep Studies, Chest CT, Echos and reports that pertain to why they are seeing Sleep Center    All Sleep records that have been done in the last 2 years that pertain to why they are seeing Sleep Center            Are they being seen for continuation of care for Cpap/Bipap/Avap/Trilogy/Dental Device? none    If yes to above Who and Where was Device issued/currently getting supplies from? na    Are you currently on \"Supplemental Oxygen\" during the day or night?   na                                                                                                                                                      Please remind pt to bring Cpap machine and ask to arrive 15 minutes early to appointment due traffic and congestion                                                 5. Pt Sleep Center Packet received Message left asking pt to arrive 30 minutes early to appointment if no packet received.        Yes: \"please make sure that you bring this to your appointment completed, either the doctor will not see you until this completed or you may be asked to reschedule your appointment.\"     No: mail or email to the pt and explain, \"please make sure that you bring this to your appointment completed, either the doctor will not see you until this completed or you may be asked to reschedule your appointment.\"     ~If pt coming early to fill packet out, ask that they come 30 minutes prior to their appointment~     6. Has the pt's medication list been updated and preferred pharmacy added?     7. Has " Care Transitions focused note:      Establish care set up with Teresa Prescott on Monday Nov 13th at 9:30 am    Updated nsg staff in .    No further concerns    BARBARA Quick    "the allergy list been reviewed?    \"Thank you for choosing [facility] and we look forward to seeing you at your upcoming appointment\"     "

## 2023-11-11 ENCOUNTER — HOSPITAL ENCOUNTER (EMERGENCY)
Facility: CLINIC | Age: 23
Discharge: HOME | End: 2023-11-12
Attending: EMERGENCY MEDICINE | Admitting: EMERGENCY MEDICINE
Payer: COMMERCIAL

## 2023-11-11 ENCOUNTER — APPOINTMENT (OUTPATIENT)
Dept: GENERAL RADIOLOGY | Facility: CLINIC | Age: 23
End: 2023-11-11
Attending: EMERGENCY MEDICINE
Payer: COMMERCIAL

## 2023-11-11 DIAGNOSIS — R09.02 HYPOXIA: ICD-10-CM

## 2023-11-11 DIAGNOSIS — D57.00 SICKLE CELL PAIN CRISIS (H): ICD-10-CM

## 2023-11-11 LAB
ANION GAP SERPL CALCULATED.3IONS-SCNC: 6 MMOL/L (ref 7–15)
BASOPHILS # BLD AUTO: 0.2 10E3/UL (ref 0–0.2)
BASOPHILS NFR BLD AUTO: 2 %
BUN SERPL-MCNC: 9.1 MG/DL (ref 6–20)
CALCIUM SERPL-MCNC: 8.7 MG/DL (ref 8.6–10)
CHLORIDE SERPL-SCNC: 106 MMOL/L (ref 98–107)
CREAT SERPL-MCNC: 0.53 MG/DL (ref 0.51–0.95)
DEPRECATED HCO3 PLAS-SCNC: 27 MMOL/L (ref 22–29)
EGFRCR SERPLBLD CKD-EPI 2021: >90 ML/MIN/1.73M2
EOSINOPHIL # BLD AUTO: 0.6 10E3/UL (ref 0–0.7)
EOSINOPHIL NFR BLD AUTO: 5 %
ERYTHROCYTE [DISTWIDTH] IN BLOOD BY AUTOMATED COUNT: 24.1 % (ref 10–15)
GLUCOSE SERPL-MCNC: 118 MG/DL (ref 70–99)
HCT VFR BLD AUTO: 18.5 % (ref 35–47)
HGB BLD-MCNC: 6.7 G/DL (ref 11.7–15.7)
IMM GRANULOCYTES # BLD: 0 10E3/UL
IMM GRANULOCYTES NFR BLD: 0 %
LYMPHOCYTES # BLD AUTO: 3.2 10E3/UL (ref 0.8–5.3)
LYMPHOCYTES NFR BLD AUTO: 28 %
MCH RBC QN AUTO: 32.4 PG (ref 26.5–33)
MCHC RBC AUTO-ENTMCNC: 36.2 G/DL (ref 31.5–36.5)
MCV RBC AUTO: 89 FL (ref 78–100)
MONOCYTES # BLD AUTO: 1.1 10E3/UL (ref 0–1.3)
MONOCYTES NFR BLD AUTO: 10 %
NEUTROPHILS # BLD AUTO: 6.3 10E3/UL (ref 1.6–8.3)
NEUTROPHILS NFR BLD AUTO: 55 %
NRBC # BLD AUTO: 0.4 10E3/UL
NRBC BLD AUTO-RTO: 3 /100
PLATELET # BLD AUTO: 346 10E3/UL (ref 150–450)
POTASSIUM SERPL-SCNC: 3.6 MMOL/L (ref 3.4–5.3)
RBC # BLD AUTO: 2.07 10E6/UL (ref 3.8–5.2)
SODIUM SERPL-SCNC: 139 MMOL/L (ref 135–145)
WBC # BLD AUTO: 11.3 10E3/UL (ref 4–11)

## 2023-11-11 PROCEDURE — 250N000013 HC RX MED GY IP 250 OP 250 PS 637: Performed by: EMERGENCY MEDICINE

## 2023-11-11 PROCEDURE — 250N000009 HC RX 250: Performed by: EMERGENCY MEDICINE

## 2023-11-11 PROCEDURE — 250N000011 HC RX IP 250 OP 636: Performed by: EMERGENCY MEDICINE

## 2023-11-11 PROCEDURE — 71046 X-RAY EXAM CHEST 2 VIEWS: CPT

## 2023-11-11 PROCEDURE — 96361 HYDRATE IV INFUSION ADD-ON: CPT | Performed by: EMERGENCY MEDICINE

## 2023-11-11 PROCEDURE — 99291 CRITICAL CARE FIRST HOUR: CPT | Mod: 25 | Performed by: EMERGENCY MEDICINE

## 2023-11-11 PROCEDURE — 99285 EMERGENCY DEPT VISIT HI MDM: CPT | Mod: 25 | Performed by: EMERGENCY MEDICINE

## 2023-11-11 PROCEDURE — 258N000003 HC RX IP 258 OP 636: Performed by: EMERGENCY MEDICINE

## 2023-11-11 PROCEDURE — 94640 AIRWAY INHALATION TREATMENT: CPT | Performed by: EMERGENCY MEDICINE

## 2023-11-11 PROCEDURE — 96374 THER/PROPH/DIAG INJ IV PUSH: CPT | Performed by: EMERGENCY MEDICINE

## 2023-11-11 PROCEDURE — 96375 TX/PRO/DX INJ NEW DRUG ADDON: CPT | Performed by: EMERGENCY MEDICINE

## 2023-11-11 PROCEDURE — 96376 TX/PRO/DX INJ SAME DRUG ADON: CPT | Performed by: EMERGENCY MEDICINE

## 2023-11-11 PROCEDURE — 93010 ELECTROCARDIOGRAM REPORT: CPT | Performed by: EMERGENCY MEDICINE

## 2023-11-11 PROCEDURE — 85025 COMPLETE CBC W/AUTO DIFF WBC: CPT | Performed by: EMERGENCY MEDICINE

## 2023-11-11 PROCEDURE — 93005 ELECTROCARDIOGRAM TRACING: CPT | Performed by: EMERGENCY MEDICINE

## 2023-11-11 PROCEDURE — 80048 BASIC METABOLIC PNL TOTAL CA: CPT | Performed by: EMERGENCY MEDICINE

## 2023-11-11 PROCEDURE — 36415 COLL VENOUS BLD VENIPUNCTURE: CPT | Performed by: EMERGENCY MEDICINE

## 2023-11-11 RX ORDER — ACETAMINOPHEN 325 MG/1
975 TABLET ORAL ONCE
Status: COMPLETED | OUTPATIENT
Start: 2023-11-11 | End: 2023-11-11

## 2023-11-11 RX ORDER — ONDANSETRON 2 MG/ML
4 INJECTION INTRAMUSCULAR; INTRAVENOUS ONCE
Status: COMPLETED | OUTPATIENT
Start: 2023-11-11 | End: 2023-11-11

## 2023-11-11 RX ORDER — KETOROLAC TROMETHAMINE 15 MG/ML
15 INJECTION, SOLUTION INTRAMUSCULAR; INTRAVENOUS ONCE
Status: COMPLETED | OUTPATIENT
Start: 2023-11-11 | End: 2023-11-11

## 2023-11-11 RX ORDER — IPRATROPIUM BROMIDE AND ALBUTEROL SULFATE 2.5; .5 MG/3ML; MG/3ML
3 SOLUTION RESPIRATORY (INHALATION) ONCE
Status: COMPLETED | OUTPATIENT
Start: 2023-11-11 | End: 2023-11-11

## 2023-11-11 RX ADMIN — IPRATROPIUM BROMIDE AND ALBUTEROL SULFATE 3 ML: .5; 3 SOLUTION RESPIRATORY (INHALATION) at 18:45

## 2023-11-11 RX ADMIN — SODIUM CHLORIDE, POTASSIUM CHLORIDE, SODIUM LACTATE AND CALCIUM CHLORIDE 1000 ML: 600; 310; 30; 20 INJECTION, SOLUTION INTRAVENOUS at 18:52

## 2023-11-11 RX ADMIN — ACETAMINOPHEN 975 MG: 325 TABLET, FILM COATED ORAL at 18:45

## 2023-11-11 RX ADMIN — ONDANSETRON 4 MG: 2 INJECTION INTRAMUSCULAR; INTRAVENOUS at 20:23

## 2023-11-11 RX ADMIN — HYDROMORPHONE HYDROCHLORIDE 1 MG: 1 INJECTION, SOLUTION INTRAMUSCULAR; INTRAVENOUS; SUBCUTANEOUS at 22:04

## 2023-11-11 RX ADMIN — KETOROLAC TROMETHAMINE 15 MG: 15 INJECTION, SOLUTION INTRAMUSCULAR; INTRAVENOUS at 18:45

## 2023-11-11 RX ADMIN — HYDROMORPHONE HYDROCHLORIDE 1 MG: 1 INJECTION, SOLUTION INTRAMUSCULAR; INTRAVENOUS; SUBCUTANEOUS at 20:21

## 2023-11-11 RX ADMIN — SODIUM CHLORIDE, POTASSIUM CHLORIDE, SODIUM LACTATE AND CALCIUM CHLORIDE 1000 ML: 600; 310; 30; 20 INJECTION, SOLUTION INTRAVENOUS at 22:05

## 2023-11-11 RX ADMIN — HYDROMORPHONE HYDROCHLORIDE 1 MG: 1 INJECTION, SOLUTION INTRAMUSCULAR; INTRAVENOUS; SUBCUTANEOUS at 18:45

## 2023-11-11 ASSESSMENT — ACTIVITIES OF DAILY LIVING (ADL)
ADLS_ACUITY_SCORE: 35

## 2023-11-12 VITALS
TEMPERATURE: 98.4 F | HEART RATE: 103 BPM | WEIGHT: 145.5 LBS | SYSTOLIC BLOOD PRESSURE: 129 MMHG | DIASTOLIC BLOOD PRESSURE: 75 MMHG | OXYGEN SATURATION: 97 % | RESPIRATION RATE: 18 BRPM | BODY MASS INDEX: 24.98 KG/M2

## 2023-11-12 LAB
ABO/RH(D): NORMAL
ANTIBODY SCREEN: NEGATIVE
ATRIAL RATE - MUSE: 95 BPM
DIASTOLIC BLOOD PRESSURE - MUSE: NORMAL MMHG
INTERPRETATION ECG - MUSE: NORMAL
P AXIS - MUSE: 74 DEGREES
PR INTERVAL - MUSE: 154 MS
QRS DURATION - MUSE: 74 MS
QT - MUSE: 402 MS
QTC - MUSE: 505 MS
R AXIS - MUSE: 51 DEGREES
SPECIMEN EXPIRATION DATE: NORMAL
SYSTOLIC BLOOD PRESSURE - MUSE: NORMAL MMHG
T AXIS - MUSE: 32 DEGREES
VENTRICULAR RATE- MUSE: 95 BPM

## 2023-11-12 PROCEDURE — 250N000011 HC RX IP 250 OP 636: Mod: JZ | Performed by: EMERGENCY MEDICINE

## 2023-11-12 RX ORDER — HEPARIN SODIUM (PORCINE) LOCK FLUSH IV SOLN 100 UNIT/ML 100 UNIT/ML
5-10 SOLUTION INTRAVENOUS
Status: DISCONTINUED | OUTPATIENT
Start: 2023-11-12 | End: 2023-11-12 | Stop reason: HOSPADM

## 2023-11-12 RX ADMIN — HEPARIN 5 ML: 100 SYRINGE at 00:26

## 2023-11-12 NOTE — ED NOTES
Received report on pt with hx of sickle cell, presenting with generalized body pain and hypoxia. Pt is In room, awake, AOX3, NAD, VSS, safety maintained, will cont to monitor.

## 2023-11-12 NOTE — DISCHARGE INSTRUCTIONS
Please contact your hematology team regarding outpatient infusion, home medications, and any indication for blood transfusions  Please return to emergency department for fever>104F, persistent vomiting, worsening chest pain, shortness of breath

## 2023-11-12 NOTE — ED PROVIDER NOTES
ED Provider Note  [facility]      History     Chief Complaint   Patient presents with    Generalized Body Aches     Sickle cell pain all over her body since yesterday.     The history is provided by the patient and medical records.     Jennifer Cervantes is a 23 year old female with sickle cell disease SS. Patient presents with 1 day of worsening full body pain in shoulders hips, back, legs bilaterally. No fevers, cough, chest pain, or shortness of breath. Patient is taking home pain med regimen and presents for 9 out of 10 pain typical of her sickle cell flares.    This part of the medical record was transcribed by Pawel Phillips, Medical Scribe, from a dictation done by Venancio Nava MD.      Past Medical History  Past Medical History:   Diagnosis Date    Anxiety     Bleeding disorder (H24)     Blood clotting disorder (H24)     Cerebral infarction (H) 2015    Cognitive developmental delay     low IQ. 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 side (H)     right hand contractures    Iron overload due to repeated red blood cell transfusions     Migraines     Multiple subsegmental pulmonary emboli without acute cor pulmonale (H) 02/01/2021    Oppositional defiant behavior     Presence of intrauterine contraceptive device 2/18/2020    Superficial venous thrombosis of arm, right 03/25/2021    Uncomplicated asthma      Past Surgical History:   Procedure Laterality Date    AS INSERT TUNNELED CV 2 CATH W/O PORT/PUMP      CHOLECYSTECTOMY      CV RIGHT HEART CATH MEASUREMENTS RECORDED N/A 11/18/2021    Procedure: Right Heart Cath;  Surgeon: Jackson Stauffer MD;  Location:  HEART CARDIAC CATH LAB    INSERT PORT VASCULAR ACCESS Left 4/21/2021    Procedure: INSERTION, VASCULAR ACCESS PORT (NOT SURE ON SIDE UNTIL REMOVAL);  Surgeon: Rajan More MD;  [place] [facility]     IR CHEST PORT PLACEMENT > 5 YRS OF AGE  4/21/2021    IR CVC NON TUNNEL LINE REMOVAL  5/6/2021    IR CVC NON TUNNEL PLACEMENT > 5 YRS  04/07/2020    IR CVC NON TUNNEL PLACEMENT > 5 YRS  4/30/2021    IR CVC NON TUNNEL PLACEMENT > 5 YRS  9/7/2022    IR PORT REMOVAL LEFT  4/21/2021    REMOVE PORT VASCULAR ACCESS Left 4/21/2021    Procedure: REMOVAL, VASCULAR ACCESS PORT LEFT;  Surgeon: Rajan More MD;  Location: [facility] OR    REPAIR TENDON ELBOW Right 10/02/2019    Procedure: Right Elbow Flexor Lengthening, Flexor Pronator Slide Of Wrist and Finger, Thumb Adductor Lengthening;  Surgeon: Anai Franco MD;  Location: [facility] OR    TONSILLECTOMY Bilateral 10/02/2019    Procedure: Bilateral Tonsillectomy;  Surgeon: Farhana Guy MD;  Location: [facility] OR    ZZC BREAST REDUCTION (INCLUDES LIPO) TIER 3 Bilateral 04/23/2019     acetaminophen (TYLENOL) 325 MG tablet  albuterol (PROAIR HFA/PROVENTIL HFA/VENTOLIN HFA) 108 (90 Base) MCG/ACT inhaler  albuterol (PROVENTIL) (2.5 MG/3ML) 0.083% neb solution  aspirin (ASA) 81 MG chewable tablet  budesonide-formoterol (SYMBICORT) 160-4.5 MCG/ACT Inhaler  cetirizine (ZYRTEC) 10 MG tablet  deferasirox (JADENU) 360 MG tablet  diphenhydrAMINE (BENADRYL) 25 MG capsule  EPINEPHrine (ANY BX GENERIC EQUIV) 0.3 MG/0.3ML injection 2-pack  Hydroxyurea 1000 MG TABS  methocarbamol (ROBAXIN) 750 MG tablet  naloxone (NARCAN) 4 MG/0.1ML nasal spray  ondansetron (ZOFRAN) 8 MG tablet  oxyCODONE IR (ROXICODONE) 10 MG tablet  predniSONE (DELTASONE) 20 MG tablet      Allergies   Allergen Reactions    Contrast Dye      Hives and breathing issues    Fish-Derived Products Hives    Seafood Hives    Gadolinium     Iodinated Contrast Media      Family History  Family History   Problem Relation Age of Onset    Sickle Cell Trait Mother     Hypertension Mother     Asthma Mother     Sickle Cell Trait Father     Glaucoma No family hx of     Macular Degeneration No family hx of     Diabetes No family hx of      Gout No family hx of      Social History   Social History     Tobacco Use    Smoking status: Never     Passive exposure: Never    Smokeless tobacco: Never   Substance Use Topics    Alcohol use: Not Currently     Alcohol/week: 0.0 standard drinks of alcohol    Drug use: Never         A medically appropriate review of systems was performed with pertinent positives and negatives noted in the HPI, and all other systems negative.    Physical Exam   BP: 127/75  Pulse: 95  Temp: 98.5  F (36.9  C)  Resp: 15  Weight: 66 kg (145 lb 8 oz)  SpO2: 92 %  Physical Exam  Patient appears in moderate pain, slowly moving in bed secondary to pain, however alert and awake. Breathing comfortably, lungs clear. Heart borderline tachycardic but regular     ED Course, Procedures, & Data     ED Course as of 11/11/23 2321   Sat Nov 11, 2023 2058 I have independently reviewed the chest x-ray showing no signs of pneumothorax or pneumonia.      Pain is down to 6 out of 10.  Patient denies shortness of breath or chest pain but continues to have borderline hypoxia so will order CBC and BMP to check for anemia or any organ dysfunction or electrolyte abnormality     Procedures            EKG Interpretation:      Interpreted by Venancio Nava MD  Time reviewed: 1820  Symptoms at time of EKG: Hypoxia  Rhythm: normal sinus   Rate: 95 bpm  Axis: Normal  Conduction: QTc is 505 ms  ST Segments/ T Waves: No acute ischemic changes    Clinical Impression: Sinus rhythm with prolonged QT and no ischemic changes.                        No results found for any visits on 11/11/23.  Medications   ondansetron (ZOFRAN) injection 4 mg (0 mg Intravenous Hold 11/11/23 1844)   HYDROmorphone (DILAUDID) injection 1 mg (has no administration in time range)   lactated ringers BOLUS 1,000 mL (1,000 mLs Intravenous $New Bag 11/11/23 1852)   HYDROmorphone (DILAUDID) injection 1 mg (1 mg Intravenous $Given 11/11/23 1845)   ketorolac (TORADOL) injection 15 mg (15 mg  Intravenous $Given 11/11/23 1845)   acetaminophen (TYLENOL) tablet 975 mg (975 mg Oral $Given 11/11/23 1845)   ipratropium - albuterol 0.5 mg/2.5 mg/3 mL (DUONEB) neb solution 3 mL (3 mLs Nebulization $Given 11/11/23 1845)     Labs Ordered and Resulted from Time of ED Arrival to Time of ED Departure - No data to display  Chest XR,  PA & LAT    (Results Pending)          Critical care was performed.   Critical Care Addendum  My initial assessment, based on my review of nursing observations, review of vital signs, focused history, physical exam, review of cardiac rhythm monitor, and 12 lead ECG analysis, established a high suspicion that Jennifer Cervantes has  hypoxia and severe sickle cell pain , which requires immediate intervention, and therefore she is critically ill.     After the initial assessment, the care team initiated IV fluid administration and initiated medication therapy with IV Dilaudid IV Toradol, nebulizer  to provide stabilization care. Due to the critical nature of this patient, I reassessed vital signs, physical exam, 12 lead ECG analysis, and respiratory status multiple times prior to her disposition.     Time also spent performing documentation, reviewing test results, and and physical reassessments .     Critical care time (excluding teaching time and procedures): 35 minutes.     Assessment & Plan    I reviewed the hematology notes and the snapshot with the pain regimen plan and will provide acute sickle cell pain crisis treatment with IV fluids, IV Dilaudid, Toradol, Tylenol, 1 dose of IV Zofran. Will access port. We will recheck initial O2 sat of 92% as this does not correlate to her signs or symptoms. If persistently hypoxic, will screen for acute chest with chest x-ray and consider treatment with nebulizer given patient has underlying asthma. Will reassess after initial treatment.    730p: Second dose of Dilaudid for 7 out of 10 pain.  Patient has true hypoxia and needs a nebulizer treatment.     ED Course as of 11/11/23 2321   Sat Nov 11, 2023 2058 I have independently reviewed the chest x-ray showing no signs of pneumothorax or pneumonia.      Pain is down to 6 out of 10.  Patient denies shortness of breath or chest pain but continues to have borderline hypoxia so will order CBC and BMP to check for anemia or any organ dysfunction or electrolyte abnormality     1130p: Patient feeling mild to moderate pain after third dose of IV pain medication.  Patient endorses being able to contact hematology for outpatient infusion.    Labs show a hemoglobin of 6.7.  I have reviewed the labs from 8 days ago showing 7.0 hemoglobin.  Will not transfuse but will provide patient's return precautions and close outpatient follow-up instructions    I have reviewed the nursing notes. I have reviewed the findings, diagnosis, plan and need for follow up with the patient.    New Prescriptions    No medications on file       Final diagnoses:   Sickle cell pain crisis (H)   Hypoxia       Venancio Nava MD.  [facility] EMERGENCY DEPARTMENT  11/11/2023     Venancio Nava MD  11/11/23 2053       Venancio Nava MD  11/11/23 2322

## 2023-11-12 NOTE — ED NOTES
Patient is discharge from ED to home, VSS, NAD, discharge instructions provided to pt, she verbalized understanding, pt ambulate from ED in stable condition with all belongings.

## 2023-11-13 ENCOUNTER — PATIENT OUTREACH (OUTPATIENT)
Dept: CARE COORDINATION | Facility: CLINIC | Age: 23
End: 2023-11-13
Payer: COMMERCIAL

## 2023-11-13 ENCOUNTER — INFUSION THERAPY VISIT (OUTPATIENT)
Dept: TRANSPLANT | Facility: CLINIC | Age: 23
End: 2023-11-13
Attending: PEDIATRICS
Payer: COMMERCIAL

## 2023-11-13 ENCOUNTER — NURSE TRIAGE (OUTPATIENT)
Dept: ONCOLOGY | Facility: CLINIC | Age: 23
End: 2023-11-13
Payer: COMMERCIAL

## 2023-11-13 VITALS
RESPIRATION RATE: 16 BRPM | OXYGEN SATURATION: 90 % | HEART RATE: 93 BPM | SYSTOLIC BLOOD PRESSURE: 121 MMHG | TEMPERATURE: 98.2 F | DIASTOLIC BLOOD PRESSURE: 80 MMHG

## 2023-11-13 DIAGNOSIS — G81.10 SPASTIC HEMIPLEGIA, UNSPECIFIED ETIOLOGY, UNSPECIFIED LATERALITY (H): ICD-10-CM

## 2023-11-13 DIAGNOSIS — D57.00 SICKLE CELL PAIN CRISIS (H): Primary | ICD-10-CM

## 2023-11-13 PROCEDURE — 96376 TX/PRO/DX INJ SAME DRUG ADON: CPT

## 2023-11-13 PROCEDURE — 96361 HYDRATE IV INFUSION ADD-ON: CPT

## 2023-11-13 PROCEDURE — 250N000013 HC RX MED GY IP 250 OP 250 PS 637: Performed by: PEDIATRICS

## 2023-11-13 PROCEDURE — 86901 BLOOD TYPING SEROLOGIC RH(D): CPT

## 2023-11-13 PROCEDURE — 258N000003 HC RX IP 258 OP 636: Performed by: PEDIATRICS

## 2023-11-13 PROCEDURE — 250N000011 HC RX IP 250 OP 636: Mod: JZ | Performed by: PEDIATRICS

## 2023-11-13 PROCEDURE — 86923 COMPATIBILITY TEST ELECTRIC: CPT | Performed by: PEDIATRICS

## 2023-11-13 PROCEDURE — 96374 THER/PROPH/DIAG INJ IV PUSH: CPT

## 2023-11-13 PROCEDURE — 96375 TX/PRO/DX INJ NEW DRUG ADDON: CPT

## 2023-11-13 PROCEDURE — 36591 DRAW BLOOD OFF VENOUS DEVICE: CPT

## 2023-11-13 RX ORDER — KETOROLAC TROMETHAMINE 30 MG/ML
30 INJECTION, SOLUTION INTRAMUSCULAR; INTRAVENOUS ONCE
Status: COMPLETED | OUTPATIENT
Start: 2023-11-13 | End: 2023-11-13

## 2023-11-13 RX ORDER — ONDANSETRON 8 MG/1
8 TABLET, ORALLY DISINTEGRATING ORAL
Status: COMPLETED | OUTPATIENT
Start: 2023-11-13 | End: 2023-11-13

## 2023-11-13 RX ORDER — KETOROLAC TROMETHAMINE 30 MG/ML
30 INJECTION, SOLUTION INTRAMUSCULAR; INTRAVENOUS ONCE
Status: CANCELLED
Start: 2024-01-01 | End: 2024-01-01

## 2023-11-13 RX ORDER — HEPARIN SODIUM,PORCINE 10 UNIT/ML
5 VIAL (ML) INTRAVENOUS
Status: CANCELLED | OUTPATIENT
Start: 2024-01-01

## 2023-11-13 RX ORDER — ONDANSETRON 4 MG/1
8 TABLET, FILM COATED ORAL
Status: CANCELLED
Start: 2024-01-01

## 2023-11-13 RX ORDER — DIPHENHYDRAMINE HCL 25 MG
25 CAPSULE ORAL
Status: CANCELLED
Start: 2024-01-01

## 2023-11-13 RX ORDER — HEPARIN SODIUM (PORCINE) LOCK FLUSH IV SOLN 100 UNIT/ML 100 UNIT/ML
5 SOLUTION INTRAVENOUS
Status: CANCELLED | OUTPATIENT
Start: 2024-01-01

## 2023-11-13 RX ORDER — DIPHENHYDRAMINE HCL 25 MG
25 CAPSULE ORAL
Status: COMPLETED | OUTPATIENT
Start: 2023-11-13 | End: 2023-11-13

## 2023-11-13 RX ADMIN — HYDROMORPHONE HYDROCHLORIDE 1 MG: 1 INJECTION, SOLUTION INTRAMUSCULAR; INTRAVENOUS; SUBCUTANEOUS at 11:12

## 2023-11-13 RX ADMIN — DIPHENHYDRAMINE HYDROCHLORIDE 25 MG: 25 CAPSULE ORAL at 11:16

## 2023-11-13 RX ADMIN — ONDANSETRON 8 MG: 8 TABLET, ORALLY DISINTEGRATING ORAL at 11:16

## 2023-11-13 RX ADMIN — HYDROMORPHONE HYDROCHLORIDE 1 MG: 1 INJECTION, SOLUTION INTRAMUSCULAR; INTRAVENOUS; SUBCUTANEOUS at 13:17

## 2023-11-13 RX ADMIN — KETOROLAC TROMETHAMINE 30 MG: 30 INJECTION, SOLUTION INTRAMUSCULAR; INTRAVENOUS at 11:11

## 2023-11-13 RX ADMIN — HYDROMORPHONE HYDROCHLORIDE 1 MG: 1 INJECTION, SOLUTION INTRAMUSCULAR; INTRAVENOUS; SUBCUTANEOUS at 12:13

## 2023-11-13 RX ADMIN — SODIUM CHLORIDE, POTASSIUM CHLORIDE, SODIUM LACTATE AND CALCIUM CHLORIDE 1000 ML: 600; 310; 30; 20 INJECTION, SOLUTION INTRAVENOUS at 11:11

## 2023-11-13 ASSESSMENT — PAIN SCALES - GENERAL: PAINLEVEL: EXTREME PAIN (9)

## 2023-11-13 NOTE — TELEPHONE ENCOUNTER
Oncology Nurse Triage - Sickle Cell Pain Crisis:    Situation: Jennifer  calling about Sickle Cell Pain Crisis, requesting to be added on for IV fluids and pain medicine    Background:     Patient's last infusion was 11/9/23  Last clinic visit date:11/3/23 Patricia Mantilla   Does patient have active treatment plan?  Yes      Assessment of Symptoms:  Onset/Duration of symptoms: 2 day    Is it typical sickle cell pain? Yes   Location: low back   Character: Sharp           Intensity: 9/10    Any radiation of pain, numbness, tingling, weakness, warmth, swelling, discoloration of arms or legs?No     Fever?No  (if yes max temperature recorded in last 24 hours):      Chest Pain Present: No     Shortness of breath: No     Other home therapies tried: HEAT/HEATING PAD and WARM BATH     Last home medication taken and when: 6am oxycodone and muscle relaxer and ibuprofen     Any Refills Needed?: No     Does patient have transportation & length of time to get to clinic: No can get to clinic if early in day, otherwise needs medical transport and needs ride home         Recommendations:     Placed on infusion call list     If you do not hear from the infusion center by 2pm then you will not be able to get in for an infusion today. If symptoms worsen while waiting for call back, and/or you experience fever, chills, SOB, chest pain, cough, n/v, dizziness, numbness, swelling, discoloration of extremities, then seek emergency evaluation in Emergency Department.     Please note, if you are late for your appt, you risk losing your infusion appt as it may delay another patient's infusion who arrived on time.

## 2023-11-13 NOTE — PROGRESS NOTES
Infusion Nursing Note:  Jennifer Cervantes presents today for add on infusion.    Patient seen by provider today: No   present during visit today: Not Applicable.    Note: Patient arrived for add on IVF/pain medication. Report 9/10 pain to low back. Was in ED on 11/11 and reports pain has continued despite home pain medication. Patient also notes, she is feeling sluggish, O2 sats at 89-90 % RA in ED, Hgb 6.7. States this is typically when she received a transfusion. Dr. Duncan paged and notified of symptoms. Patient received 1L LR over 2 hours, benadryl 25 mg PO, Zofran 8 mg PO, Toradol 30 mg IVP, and Dilaudid 1 mg IVP every 1 hour x 3 doses. Patient reports pain improvement rated 5/10 following final dilaudid dose.     11/13/23 @ 1134 -- Dr. Duncan/Jennifer Lai RN -- OK to transfuse 2U PRBC this week. ABO to be drawn today.     Patient scheduled for 2U pRBC on 11/15 at 1300. Patient verbalized understanding of plan and will present to ED for worsening symptoms, chest pain, SOB, lightheadedness.       Intravenous Access:  Peripheral IV placed.    Treatment Conditions:  Not Applicable.      Post Infusion Assessment:  Patient tolerated infusion without incident.  Blood return noted pre and post infusion.  Site patent and intact, free from redness, edema or discomfort.  No evidence of extravasations.  Access discontinued per protocol.       Discharge Plan:   AVS to patient via [employer].  Patient will return 11/15 for next appointment.   Patient discharged in stable condition accompanied by: self.  Departure Mode: Ambulatory.      Jennifer Lai RN

## 2023-11-13 NOTE — PROGRESS NOTES
Social Work - Transportation  [facility]    Data/Intervention:  Patient Name: Jennifer Cervantes Goes By: Jennifer    /Age: 2000 (23 year old)    Referral From: [facility] Triage  Reason for Referral:  support requested for patient transportation needs for today's appointment.  Assessment:  called [employer] to arrange ride. Patient will need to call [phone] when ready for return ride home.  Plan: Patient is aware of the transportation plan.  available to assist with any other needs.    CARLOS Chavez,[facility]  Hematology/Oncology Social Worker  Phone:[phone] Pager: [phone]

## 2023-11-13 NOTE — TELEPHONE ENCOUNTER
BMT can take at 11:00 am     Message sent to CCOD to schedule appointment     Message sent to  to arrange transportation.

## 2023-11-15 ENCOUNTER — INFUSION THERAPY VISIT (OUTPATIENT)
Dept: TRANSPLANT | Facility: CLINIC | Age: 23
End: 2023-11-15
Attending: PEDIATRICS
Payer: COMMERCIAL

## 2023-11-15 ENCOUNTER — NURSE TRIAGE (OUTPATIENT)
Dept: ONCOLOGY | Facility: CLINIC | Age: 23
End: 2023-11-15
Payer: COMMERCIAL

## 2023-11-15 VITALS
OXYGEN SATURATION: 95 % | TEMPERATURE: 97.9 F | DIASTOLIC BLOOD PRESSURE: 68 MMHG | RESPIRATION RATE: 16 BRPM | HEART RATE: 68 BPM | SYSTOLIC BLOOD PRESSURE: 111 MMHG

## 2023-11-15 DIAGNOSIS — D57.00 SICKLE CELL PAIN CRISIS (H): ICD-10-CM

## 2023-11-15 DIAGNOSIS — G81.10 SPASTIC HEMIPLEGIA, UNSPECIFIED ETIOLOGY, UNSPECIFIED LATERALITY (H): ICD-10-CM

## 2023-11-15 DIAGNOSIS — D57.1 HB-SS DISEASE WITHOUT CRISIS (H): Primary | ICD-10-CM

## 2023-11-15 DIAGNOSIS — Z86.73 HISTORY OF STROKE: ICD-10-CM

## 2023-11-15 PROCEDURE — 96376 TX/PRO/DX INJ SAME DRUG ADON: CPT

## 2023-11-15 PROCEDURE — 36430 TRANSFUSION BLD/BLD COMPNT: CPT

## 2023-11-15 PROCEDURE — 250N000011 HC RX IP 250 OP 636: Performed by: PEDIATRICS

## 2023-11-15 PROCEDURE — 96375 TX/PRO/DX INJ NEW DRUG ADDON: CPT

## 2023-11-15 PROCEDURE — 258N000003 HC RX IP 258 OP 636: Performed by: PEDIATRICS

## 2023-11-15 PROCEDURE — 96374 THER/PROPH/DIAG INJ IV PUSH: CPT

## 2023-11-15 PROCEDURE — 250N000013 HC RX MED GY IP 250 OP 250 PS 637: Performed by: PEDIATRICS

## 2023-11-15 RX ORDER — DIPHENHYDRAMINE HCL 25 MG
25 CAPSULE ORAL
Status: COMPLETED | OUTPATIENT
Start: 2023-11-15 | End: 2023-11-15

## 2023-11-15 RX ORDER — ONDANSETRON 4 MG/1
8 TABLET, FILM COATED ORAL
Status: CANCELLED
Start: 2024-01-01

## 2023-11-15 RX ORDER — DIPHENHYDRAMINE HCL 25 MG
25 CAPSULE ORAL
Status: CANCELLED
Start: 2024-01-01

## 2023-11-15 RX ORDER — ONDANSETRON 4 MG/1
8 TABLET, ORALLY DISINTEGRATING ORAL
Status: COMPLETED | OUTPATIENT
Start: 2023-11-15 | End: 2023-11-15

## 2023-11-15 RX ORDER — HEPARIN SODIUM,PORCINE 10 UNIT/ML
5 VIAL (ML) INTRAVENOUS
Status: CANCELLED | OUTPATIENT
Start: 2024-01-01

## 2023-11-15 RX ORDER — HEPARIN SODIUM (PORCINE) LOCK FLUSH IV SOLN 100 UNIT/ML 100 UNIT/ML
5 SOLUTION INTRAVENOUS
Status: CANCELLED | OUTPATIENT
Start: 2024-01-01

## 2023-11-15 RX ORDER — HEPARIN SODIUM (PORCINE) LOCK FLUSH IV SOLN 100 UNIT/ML 100 UNIT/ML
5 SOLUTION INTRAVENOUS ONCE
Status: COMPLETED | OUTPATIENT
Start: 2023-11-15 | End: 2023-11-15

## 2023-11-15 RX ORDER — HEPARIN SODIUM,PORCINE 10 UNIT/ML
5 VIAL (ML) INTRAVENOUS
Status: CANCELLED | OUTPATIENT
Start: 2023-11-15

## 2023-11-15 RX ORDER — HEPARIN SODIUM (PORCINE) LOCK FLUSH IV SOLN 100 UNIT/ML 100 UNIT/ML
5 SOLUTION INTRAVENOUS
Status: CANCELLED | OUTPATIENT
Start: 2023-11-15

## 2023-11-15 RX ORDER — KETOROLAC TROMETHAMINE 30 MG/ML
30 INJECTION, SOLUTION INTRAMUSCULAR; INTRAVENOUS ONCE
Status: COMPLETED | OUTPATIENT
Start: 2023-11-15 | End: 2023-11-15

## 2023-11-15 RX ORDER — KETOROLAC TROMETHAMINE 30 MG/ML
30 INJECTION, SOLUTION INTRAMUSCULAR; INTRAVENOUS ONCE
Status: CANCELLED
Start: 2024-01-01 | End: 2024-01-01

## 2023-11-15 RX ADMIN — KETOROLAC TROMETHAMINE 30 MG: 30 INJECTION, SOLUTION INTRAMUSCULAR; INTRAVENOUS at 12:03

## 2023-11-15 RX ADMIN — ONDANSETRON 8 MG: 4 TABLET, ORALLY DISINTEGRATING ORAL at 11:52

## 2023-11-15 RX ADMIN — SODIUM CHLORIDE, POTASSIUM CHLORIDE, SODIUM LACTATE AND CALCIUM CHLORIDE 1000 ML: 600; 310; 30; 20 INJECTION, SOLUTION INTRAVENOUS at 14:50

## 2023-11-15 RX ADMIN — HYDROMORPHONE HYDROCHLORIDE 1 MG: 1 INJECTION, SOLUTION INTRAMUSCULAR; INTRAVENOUS; SUBCUTANEOUS at 11:52

## 2023-11-15 RX ADMIN — Medication 5 ML: at 16:50

## 2023-11-15 RX ADMIN — DIPHENHYDRAMINE HYDROCHLORIDE 25 MG: 25 CAPSULE ORAL at 13:29

## 2023-11-15 RX ADMIN — HYDROMORPHONE HYDROCHLORIDE 1 MG: 1 INJECTION, SOLUTION INTRAMUSCULAR; INTRAVENOUS; SUBCUTANEOUS at 14:51

## 2023-11-15 RX ADMIN — HYDROMORPHONE HYDROCHLORIDE 1 MG: 1 INJECTION, SOLUTION INTRAMUSCULAR; INTRAVENOUS; SUBCUTANEOUS at 13:29

## 2023-11-15 ASSESSMENT — PAIN SCALES - GENERAL: PAINLEVEL: EXTREME PAIN (9)

## 2023-11-15 NOTE — TELEPHONE ENCOUNTER
Oncology Nurse Triage - Sickle Cell Pain Crisis:    Situation: Jennifer  calling about Sickle Cell Pain Crisis, requesting to be added on for IV fluids and pain medicine    Background:     Patient's last infusion was 11/13/2023  Last clinic visit date:11/3/23 Reta Mantilla CNP  Does patient have active treatment plan?  Yes      Assessment of Symptoms:  Onset/Duration of symptoms: 1 day    Is it typical sickle cell pain? Yes   Location: generalized  Character: Sharp           Intensity: 8/10    Any radiation of pain, numbness, tingling, weakness, warmth, swelling, discoloration of arms or legs?No     Fever?No    Chest Pain Present: No     Shortness of breath: No     Other home therapies tried: HEAT/HEATING PAD and WARM BATH     Last home medication taken and when: 6am oxycodone 10mg and other meds    Any Refills Needed?: No     Does patient have transportation & length of time to get to clinic: Yes   Already has an appt today at 1:00pm for Blood Transfusion and wondering if okay to add on IVF/Pain to plan.     Recommendations:   Meets protocol    0840 BMT would need pt to come in at 12:00pm/Noon to be able to have enough time to add on IVF/pain to infusion appt. Per Jennifer, is able toadjust transportation for arriving into clinic at Noon today.      Please note, if you are late for your appt, you risk losing your infusion appt as it may delay another patient's infusion who arrived on time.

## 2023-11-15 NOTE — PROGRESS NOTES
Infusion Nursing Note:  Jennifer Cervantes presents today for scheduled infusion.    Patient seen by provider today: No   present during visit today: Not Applicable.    Note: Patient received 2 units RBC per blood plan and Dr Duncan's approval. Patient's O2 was sating 89-92% on RA; 1 L NC applied with provider's approval. O2 within normal range. Dilaudid x3, benadryl PO, zofran PO, toradol IV, and 1 L LR bolus for 9/10 back pain with some relief (5/10 upon discharge).      Intravenous Access:  Implanted Port.    Treatment Conditions:  Results reviewed, labs MET treatment parameters, ok to proceed with treatment.      Post Infusion Assessment:  Patient tolerated infusion without incident.       Discharge Plan:   Patient and/or family verbalized understanding of discharge instructions and all questions answered.      Vicenta Boone RN

## 2023-11-17 ENCOUNTER — NURSE TRIAGE (OUTPATIENT)
Dept: ONCOLOGY | Facility: CLINIC | Age: 23
End: 2023-11-17
Payer: COMMERCIAL

## 2023-11-17 ENCOUNTER — HOSPITAL ENCOUNTER (EMERGENCY)
Facility: CLINIC | Age: 23
Discharge: HOME | End: 2023-11-18
Attending: EMERGENCY MEDICINE | Admitting: EMERGENCY MEDICINE
Payer: COMMERCIAL

## 2023-11-17 DIAGNOSIS — D57.00 SICKLE CELL PAIN CRISIS (H): ICD-10-CM

## 2023-11-17 LAB
ALBUMIN SERPL BCG-MCNC: 4.7 G/DL (ref 3.5–5.2)
ALP SERPL-CCNC: 72 U/L (ref 40–150)
ALT SERPL W P-5'-P-CCNC: 27 U/L (ref 0–50)
ANION GAP SERPL CALCULATED.3IONS-SCNC: 10 MMOL/L (ref 7–15)
AST SERPL W P-5'-P-CCNC: 45 U/L (ref 0–45)
BASOPHILS # BLD AUTO: 0.1 10E3/UL (ref 0–0.2)
BASOPHILS NFR BLD AUTO: 1 %
BILIRUB SERPL-MCNC: 4.1 MG/DL
BUN SERPL-MCNC: 13.2 MG/DL (ref 6–20)
CALCIUM SERPL-MCNC: 9.1 MG/DL (ref 8.6–10)
CHLORIDE SERPL-SCNC: 105 MMOL/L (ref 98–107)
CREAT SERPL-MCNC: 0.5 MG/DL (ref 0.51–0.95)
DEPRECATED HCO3 PLAS-SCNC: 24 MMOL/L (ref 22–29)
EGFRCR SERPLBLD CKD-EPI 2021: >90 ML/MIN/1.73M2
EOSINOPHIL # BLD AUTO: 0.5 10E3/UL (ref 0–0.7)
EOSINOPHIL NFR BLD AUTO: 4 %
ERYTHROCYTE [DISTWIDTH] IN BLOOD BY AUTOMATED COUNT: 23 % (ref 10–15)
GLUCOSE SERPL-MCNC: 109 MG/DL (ref 70–99)
HCT VFR BLD AUTO: 29.1 % (ref 35–47)
HGB BLD-MCNC: 10.4 G/DL (ref 11.7–15.7)
HOLD SPECIMEN: NORMAL
IMM GRANULOCYTES # BLD: 0.1 10E3/UL
IMM GRANULOCYTES NFR BLD: 0 %
LYMPHOCYTES # BLD AUTO: 1.8 10E3/UL (ref 0.8–5.3)
LYMPHOCYTES NFR BLD AUTO: 15 %
MCH RBC QN AUTO: 33.4 PG (ref 26.5–33)
MCHC RBC AUTO-ENTMCNC: 35.7 G/DL (ref 31.5–36.5)
MCV RBC AUTO: 94 FL (ref 78–100)
MONOCYTES # BLD AUTO: 0.9 10E3/UL (ref 0–1.3)
MONOCYTES NFR BLD AUTO: 7 %
NEUTROPHILS # BLD AUTO: 8.8 10E3/UL (ref 1.6–8.3)
NEUTROPHILS NFR BLD AUTO: 73 %
NRBC # BLD AUTO: 0.4 10E3/UL
NRBC BLD AUTO-RTO: 4 /100
PLATELET # BLD AUTO: 458 10E3/UL (ref 150–450)
POTASSIUM SERPL-SCNC: 4.2 MMOL/L (ref 3.4–5.3)
PROT SERPL-MCNC: 7.7 G/DL (ref 6.4–8.3)
RBC # BLD AUTO: 3.11 10E6/UL (ref 3.8–5.2)
RETICS # AUTO: 0.67 10E6/UL (ref 0.03–0.1)
RETICS/RBC NFR AUTO: 21.1 % (ref 0.5–2)
SODIUM SERPL-SCNC: 139 MMOL/L (ref 135–145)
WBC # BLD AUTO: 12 10E3/UL (ref 4–11)

## 2023-11-17 PROCEDURE — 96375 TX/PRO/DX INJ NEW DRUG ADDON: CPT | Performed by: EMERGENCY MEDICINE

## 2023-11-17 PROCEDURE — 96361 HYDRATE IV INFUSION ADD-ON: CPT | Performed by: EMERGENCY MEDICINE

## 2023-11-17 PROCEDURE — 99285 EMERGENCY DEPT VISIT HI MDM: CPT | Performed by: EMERGENCY MEDICINE

## 2023-11-17 PROCEDURE — 85025 COMPLETE CBC W/AUTO DIFF WBC: CPT | Performed by: EMERGENCY MEDICINE

## 2023-11-17 PROCEDURE — 85045 AUTOMATED RETICULOCYTE COUNT: CPT | Performed by: EMERGENCY MEDICINE

## 2023-11-17 PROCEDURE — 99284 EMERGENCY DEPT VISIT MOD MDM: CPT | Mod: 25 | Performed by: EMERGENCY MEDICINE

## 2023-11-17 PROCEDURE — 96374 THER/PROPH/DIAG INJ IV PUSH: CPT | Performed by: EMERGENCY MEDICINE

## 2023-11-17 PROCEDURE — 258N000003 HC RX IP 258 OP 636: Performed by: EMERGENCY MEDICINE

## 2023-11-17 PROCEDURE — 80053 COMPREHEN METABOLIC PANEL: CPT | Performed by: EMERGENCY MEDICINE

## 2023-11-17 PROCEDURE — 250N000011 HC RX IP 250 OP 636: Mod: JZ | Performed by: EMERGENCY MEDICINE

## 2023-11-17 PROCEDURE — 96376 TX/PRO/DX INJ SAME DRUG ADON: CPT | Performed by: EMERGENCY MEDICINE

## 2023-11-17 PROCEDURE — 36415 COLL VENOUS BLD VENIPUNCTURE: CPT | Performed by: EMERGENCY MEDICINE

## 2023-11-17 RX ORDER — KETOROLAC TROMETHAMINE 30 MG/ML
30 INJECTION, SOLUTION INTRAMUSCULAR; INTRAVENOUS ONCE
Status: COMPLETED | OUTPATIENT
Start: 2023-11-17 | End: 2023-11-17

## 2023-11-17 RX ORDER — OXYCODONE HYDROCHLORIDE 10 MG/1
10 TABLET ORAL EVERY 4 HOURS PRN
Qty: 25 TABLET | Refills: 0 | Status: SHIPPED | OUTPATIENT
Start: 2023-11-17 | End: 2023-11-24

## 2023-11-17 RX ORDER — ONDANSETRON 2 MG/ML
8 INJECTION INTRAMUSCULAR; INTRAVENOUS
Status: COMPLETED | OUTPATIENT
Start: 2023-11-17 | End: 2023-11-17

## 2023-11-17 RX ORDER — SODIUM CHLORIDE, SODIUM LACTATE, POTASSIUM CHLORIDE, CALCIUM CHLORIDE 600; 310; 30; 20 MG/100ML; MG/100ML; MG/100ML; MG/100ML
INJECTION, SOLUTION INTRAVENOUS CONTINUOUS
Status: DISCONTINUED | OUTPATIENT
Start: 2023-11-17 | End: 2023-11-18 | Stop reason: HOSPADM

## 2023-11-17 RX ADMIN — HYDROMORPHONE HYDROCHLORIDE 1 MG: 1 INJECTION, SOLUTION INTRAMUSCULAR; INTRAVENOUS; SUBCUTANEOUS at 20:31

## 2023-11-17 RX ADMIN — ONDANSETRON 8 MG: 2 INJECTION INTRAMUSCULAR; INTRAVENOUS at 20:32

## 2023-11-17 RX ADMIN — KETOROLAC TROMETHAMINE 30 MG: 30 INJECTION, SOLUTION INTRAMUSCULAR; INTRAVENOUS at 19:57

## 2023-11-17 RX ADMIN — SODIUM CHLORIDE, POTASSIUM CHLORIDE, SODIUM LACTATE AND CALCIUM CHLORIDE: 600; 310; 30; 20 INJECTION, SOLUTION INTRAVENOUS at 20:01

## 2023-11-17 RX ADMIN — HYDROMORPHONE HYDROCHLORIDE 1 MG: 1 INJECTION, SOLUTION INTRAMUSCULAR; INTRAVENOUS; SUBCUTANEOUS at 21:49

## 2023-11-17 RX ADMIN — HYDROMORPHONE HYDROCHLORIDE 1 MG: 1 INJECTION, SOLUTION INTRAMUSCULAR; INTRAVENOUS; SUBCUTANEOUS at 23:08

## 2023-11-17 ASSESSMENT — ACTIVITIES OF DAILY LIVING (ADL)
ADLS_ACUITY_SCORE: 35
ADLS_ACUITY_SCORE: 33
ADLS_ACUITY_SCORE: 35

## 2023-11-17 NOTE — TELEPHONE ENCOUNTER
Oncology Nurse Triage - Sickle Cell Pain Crisis:  Situation: Jennifer  calling about Sickle Cell Pain Crisis, requesting to be added on for IV fluids and pain medicine    Background:   Patient's last infusion was 11//15/23  Last clinic visit date:11/3/23 with Patricia Mantilla CNP  Does patient have active treatment plan?  Yes Pt has limit of two infusions per week. This writer sent secure chat to Patricia Mantilla at 0710 w/pt's request for additional infusion today.  0720: Patricia JIMENEZ approved adding pt to Infusion Wait List. Would like to know when pt is added because she may add her on to her schedule for visit today.    Assessment of Symptoms:  Onset/Duration of symptoms: 2 day following transfusion; got pain meds w/transfusion    Is it typical sickle cell pain? Yes but is worse since transfusion  Location: bilateral legs  Character: Sharp           Intensity: 10/10    Any radiation of pain, numbness, tingling, weakness, warmth, swelling, discoloration of arms or legs?Yes     Fever?No  Chest Pain Present: No   Shortness of breath: No     Other home therapies tried: HEAT/HEATING PAD, WARM BATH, and oxycodone, ibuprofen, muscle relaxants      Last home medication taken and when: 0600    Any Refills Needed?: Yes oxycodone, completely out this afternoon because staying on schedule. Pended up in separate refill encounter    Does patient have transportation & length of time to get to clinic: No 30 minutes    Recommendations:   If you do not hear from the infusion center by 2pm then you will not be able to get in for an infusion today. If symptoms worsen while waiting for call back, and/or you experience fever, chills, SOB, chest pain, cough, n/v, dizziness, numbness, swelling, discoloration of extremities, then seek emergency evaluation in Emergency Department.   Pt voiced understanding.    0721: Called Jennifer and informed her that Patricia approved adding her on to the wait list for infusion today.

## 2023-11-17 NOTE — TELEPHONE ENCOUNTER
Pt calling to report if something opens up in the next hour she will be able to find transportation. It would take her 25 min to get to clinic.     Writer updated wait list to reflect pt update.

## 2023-11-17 NOTE — TELEPHONE ENCOUNTER
Narcotic Refill Request    Medication(s) requested:  Oxycodone IR 10 mg tablet  Person Requesting Refill:Jennifer  What pain is the medication treating: sickle cell pain, today, bilateral leg pain  How is the medication being taken?:as prescribed, every 4H  Does pt have enough for today? She will be out this afternoon  Is pain being adequately controlled on the current regimen?: Usually, yes, but having increased pain since transfusion on 11/15  Experiencing any side effects from medication?: no    Date of most recent appointment:  11/3/23 with Patricia Mantilla CNP  Any No Show Visits:No  Next appointment:   12/28/23 with Patricia Mantilla CNP  Last fill date and by whom:  11/8/23 by Patricia Mantilla CNP   Reviewed: No    Routed/Paged provider: Patricia Mantilla CNP

## 2023-11-18 VITALS
OXYGEN SATURATION: 97 % | RESPIRATION RATE: 18 BRPM | HEART RATE: 102 BPM | DIASTOLIC BLOOD PRESSURE: 75 MMHG | SYSTOLIC BLOOD PRESSURE: 123 MMHG | TEMPERATURE: 98.9 F

## 2023-11-18 PROCEDURE — 250N000011 HC RX IP 250 OP 636: Performed by: EMERGENCY MEDICINE

## 2023-11-18 RX ORDER — HEPARIN SODIUM (PORCINE) LOCK FLUSH IV SOLN 100 UNIT/ML 100 UNIT/ML
100 SOLUTION INTRAVENOUS ONCE
Status: COMPLETED | OUTPATIENT
Start: 2023-11-18 | End: 2023-11-18

## 2023-11-18 RX ADMIN — SODIUM CHLORIDE, PRESERVATIVE FREE 100 UNITS: 5 INJECTION INTRAVENOUS at 01:06

## 2023-11-18 ASSESSMENT — ACTIVITIES OF DAILY LIVING (ADL): ADLS_ACUITY_SCORE: 35

## 2023-11-18 NOTE — ED TRIAGE NOTES
Triage Assessment (Adult)       Row Name 11/17/23 1834          Triage Assessment    Airway WDL WDL        Respiratory WDL    Respiratory WDL WDL        Skin Circulation/Temperature WDL    Skin Circulation/Temperature WDL WDL        Cardiac WDL    Cardiac WDL WDL        Peripheral/Neurovascular WDL    Peripheral Neurovascular WDL WDL        Cognitive/Neuro/Behavioral WDL    Cognitive/Neuro/Behavioral WDL WDL        [place] Coma Scale    Best Eye Response 4-->(E4) spontaneous     Best Motor Response 6-->(M6) obeys commands     Best Verbal Response 5-->(V5) oriented     [place] Coma Scale Score 15

## 2023-11-18 NOTE — ED PROVIDER NOTES
[facility] EMERGENCY DEPARTMENT ([facility])    11/17/23      ED PROVIDER NOTE  [facility] ED H - K      History     Chief Complaint   Patient presents with    Sickle Cell Pain Crisis     Since getting her blood transfusion a few days ago, pain has been uncontrolled      HPI    Jennifer Cervantes is a 23-year-old female with a history of sickle cell disease who presents to the emergency department today complaining sickle cell flare.  Patient reports that earlier today she started to notice pain in her left arm and her back which is fairly consistent for her with her typical sickle cell flares.  The pain was worse today but it started 2 days ago after she received a blood transfusion.  She states that typically after blood transfusion she does tend to have increasing pain.  This is very consistent for her with her sickle cell disease.  Patient denies any recent falls or injuries.  No fevers or chills.  No nasal congestion or sore throat.  No cough, shortness of breath, or chest pain.  No abdominal pain.  No nausea, vomiting, or diarrhea.  Patient denies any urinary symptoms.  She is taking her oxycodone, reports that recently she was down tapered from 15 mg to 10 mg, she is generally taking this every 6 hours but when she is in pain she will take it every 4 hours.  She is also using ibuprofen as well as Robaxin and this does not appear to be helpful for her.  She did call the infusion clinic today but they were unable to accommodate her due to her capacity.      Past Medical History  Past Medical History:   Diagnosis Date    Anxiety     Bleeding disorder (H24)     Blood clotting disorder (H24)     Cerebral infarction (H) 2015    Cognitive developmental delay     low IQ. 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 side (H)     right hand contractures    Iron overload due to repeated red blood cell  transfusions     Migraines     Multiple subsegmental pulmonary emboli without acute cor pulmonale (H) 02/01/2021    Oppositional defiant behavior     Presence of intrauterine contraceptive device 2/18/2020    Superficial venous thrombosis of arm, right 03/25/2021    Uncomplicated asthma      Past Surgical History:   Procedure Laterality Date    AS INSERT TUNNELED CV 2 CATH W/O PORT/PUMP      CHOLECYSTECTOMY      CV RIGHT HEART CATH MEASUREMENTS RECORDED N/A 11/18/2021    Procedure: Right Heart Cath;  Surgeon: Jackson Stauffer MD;  Location:  HEART CARDIAC CATH LAB    INSERT PORT VASCULAR ACCESS Left 4/21/2021    Procedure: INSERTION, VASCULAR ACCESS PORT (NOT SURE ON SIDE UNTIL REMOVAL);  Surgeon: Rajan More MD;  Location: [facility] OR    IR CHEST PORT PLACEMENT > 5 YRS OF AGE  4/21/2021    IR CVC NON TUNNEL LINE REMOVAL  5/6/2021    IR CVC NON TUNNEL PLACEMENT > 5 YRS  04/07/2020    IR CVC NON TUNNEL PLACEMENT > 5 YRS  4/30/2021    IR CVC NON TUNNEL PLACEMENT > 5 YRS  9/7/2022    IR PORT REMOVAL LEFT  4/21/2021    REMOVE PORT VASCULAR ACCESS Left 4/21/2021    Procedure: REMOVAL, VASCULAR ACCESS PORT LEFT;  Surgeon: Rajan More MD;  Location: [facility] OR    REPAIR TENDON ELBOW Right 10/02/2019    Procedure: Right Elbow Flexor Lengthening, Flexor Pronator Slide Of Wrist and Finger, Thumb Adductor Lengthening;  Surgeon: Anai Franco MD;  Location: [facility] OR    TONSILLECTOMY Bilateral 10/02/2019    Procedure: Bilateral Tonsillectomy;  Surgeon: Farhana Guy MD;  Location: [facility]    ZZC BREAST REDUCTION (INCLUDES LIPO) TIER 3 Bilateral 04/23/2019     methocarbamol (ROBAXIN) 750 MG tablet  oxyCODONE IR (ROXICODONE) 10 MG tablet  acetaminophen (TYLENOL) 325 MG tablet  albuterol (PROAIR HFA/PROVENTIL HFA/VENTOLIN HFA) 108 (90 Base) MCG/ACT inhaler  albuterol (PROVENTIL) (2.5 MG/3ML) 0.083% neb solution  aspirin (ASA) 81 MG chewable tablet  budesonide-formoterol (SYMBICORT) 160-4.5 MCG/ACT  Inhaler  cetirizine (ZYRTEC) 10 MG tablet  deferasirox (JADENU) 360 MG tablet  diphenhydrAMINE (BENADRYL) 25 MG capsule  EPINEPHrine (ANY BX GENERIC EQUIV) 0.3 MG/0.3ML injection 2-pack  Hydroxyurea 1000 MG TABS  naloxone (NARCAN) 4 MG/0.1ML nasal spray  ondansetron (ZOFRAN) 8 MG tablet  predniSONE (DELTASONE) 20 MG tablet      Allergies   Allergen Reactions    Contrast Dye      Hives and breathing issues    Fish-Derived Products Hives    Seafood Hives    Gadolinium     Iodinated Contrast Media      Family History  Family History   Problem Relation Age of Onset    Sickle Cell Trait Mother     Hypertension Mother     Asthma Mother     Sickle Cell Trait Father     Glaucoma No family hx of     Macular Degeneration No family hx of     Diabetes No family hx of     Gout No family hx of      Social History   Social History     Tobacco Use    Smoking status: Never     Passive exposure: Never    Smokeless tobacco: Never   Substance Use Topics    Alcohol use: Not Currently     Alcohol/week: 0.0 standard drinks of alcohol    Drug use: Never         A complete review of systems was performed with pertinent positives and negatives noted in the HPI, and all other systems negative.    Physical Exam   BP: 123/75  Pulse: 102  Temp: 98.9  F (37.2  C)  Resp: 18  SpO2: 97 %  Physical Exam  Vitals and nursing note reviewed.   Constitutional:       Appearance: She is not diaphoretic.      Comments: Adult female, sitting in wheelchair, right-sided upper extremity paresis at baseline, appears uncomfortable.   HENT:      Head: Atraumatic.      Mouth/Throat:      Mouth: Mucous membranes are moist.      Pharynx: Oropharynx is clear. No oropharyngeal exudate.   Eyes:      General: No scleral icterus.     Pupils: Pupils are equal, round, and reactive to light.   Cardiovascular:      Rate and Rhythm: Normal rate.      Pulses: Normal pulses.      Heart sounds: Normal heart sounds. No murmur heard.  Pulmonary:      Effort: No respiratory distress.       Breath sounds: Normal breath sounds.   Abdominal:      General: Bowel sounds are normal.      Palpations: Abdomen is soft.      Tenderness: There is no abdominal tenderness.   Musculoskeletal:         General: No tenderness.   Skin:     General: Skin is warm.      Findings: No rash.   Neurological:      Mental Status: She is alert.      Comments: RUE paresis with chronic hand contractures noted           ED Course, Procedures, & Data      Procedures                   Results for orders placed or performed during the hospital encounter of 11/17/23   [place] Draw     Status: None    Narrative    The following orders were created for panel order [place] Draw.  Procedure                               Abnormality         Status                     ---------                               -----------         ------                     Extra Blue Top Tube[[id number]]                              Final result               Extra Red Top Tube[[id number]]                               Final result               Extra Green Top (Lithium...[[id number]]                      Final result               Extra Green Top (Lithium...[[id number]]                      Final result               Extra Purple Top Tube[[id number]]                            Final result               Extra Purple Top Tube[[id number]]                            Final result                 Please view results for these tests on the individual orders.   Extra Blue Top Tube     Status: None   Result Value Ref Range    Hold Specimen JIC    Extra Red Top Tube     Status: None   Result Value Ref Range    Hold Specimen JIC    Extra Green Top (Lithium Heparin) Tube     Status: None   Result Value Ref Range    Hold Specimen JIC    Extra Green Top (Lithium Heparin) Tube     Status: None   Result Value Ref Range    Hold Specimen JIC    Extra Purple Top Tube     Status: None   Result Value Ref Range    Hold Specimen     Extra Purple Top Tube     Status: None   Result  Value Ref Range    Hold Specimen [facility]    Comprehensive metabolic panel     Status: Abnormal   Result Value Ref Range    Sodium 139 135 - 145 mmol/L    Potassium 4.2 3.4 - 5.3 mmol/L    Carbon Dioxide (CO2) 24 22 - 29 mmol/L    Anion Gap 10 7 - 15 mmol/L    Urea Nitrogen 13.2 6.0 - 20.0 mg/dL    Creatinine 0.50 (L) 0.51 - 0.95 mg/dL    GFR Estimate >90 >60 mL/min/1.73m2    Calcium 9.1 8.6 - 10.0 mg/dL    Chloride 105 98 - 107 mmol/L    Glucose 109 (H) 70 - 99 mg/dL    Alkaline Phosphatase 72 40 - 150 U/L    AST 45 0 - 45 U/L    ALT 27 0 - 50 U/L    Protein Total 7.7 6.4 - 8.3 g/dL    Albumin 4.7 3.5 - 5.2 g/dL    Bilirubin Total 4.1 (H) <=1.2 mg/dL   Reticulocyte count     Status: Abnormal   Result Value Ref Range    % Reticulocyte 21.1 (H) 0.5 - 2.0 %    Absolute Reticulocyte 0.670 (H) 0.025 - 0.095 10e6/uL   CBC with platelets and differential     Status: Abnormal   Result Value Ref Range    WBC Count 12.0 (H) 4.0 - 11.0 10e3/uL    RBC Count 3.11 (L) 3.80 - 5.20 10e6/uL    Hemoglobin 10.4 (L) 11.7 - 15.7 g/dL    Hematocrit 29.1 (L) 35.0 - 47.0 %    MCV 94 78 - 100 fL    MCH 33.4 (H) 26.5 - 33.0 pg    MCHC 35.7 31.5 - 36.5 g/dL    RDW 23.0 (H) 10.0 - 15.0 %    Platelet Count 458 (H) 150 - 450 10e3/uL    % Neutrophils 73 %    % Lymphocytes 15 %    % Monocytes 7 %    % Eosinophils 4 %    % Basophils 1 %    % Immature Granulocytes 0 %    NRBCs per 100 WBC 4 (H) <1 /100    Absolute Neutrophils 8.8 (H) 1.6 - 8.3 10e3/uL    Absolute Lymphocytes 1.8 0.8 - 5.3 10e3/uL    Absolute Monocytes 0.9 0.0 - 1.3 10e3/uL    Absolute Eosinophils 0.5 0.0 - 0.7 10e3/uL    Absolute Basophils 0.1 0.0 - 0.2 10e3/uL    Absolute Immature Granulocytes 0.1 <=0.4 10e3/uL    Absolute NRBCs 0.4 10e3/uL   CBC with Platelets & Differential     Status: Abnormal    Narrative    The following orders were created for panel order CBC with Platelets & Differential.  Procedure                               Abnormality         Status                      ---------                               -----------         ------                     CBC with platelets and d...[[id number]]  Abnormal            Final result                 Please view results for these tests on the individual orders.     Medications   lactated ringers infusion ( Intravenous Rate/Dose Verify 11/17/23 2246)   ketorolac (TORADOL) injection 30 mg (30 mg Intravenous $Given 11/17/23 1957)   ondansetron (ZOFRAN) injection 8 mg (8 mg Intravenous $Given 11/17/23 2032)   HYDROmorphone (DILAUDID) injection 1 mg (1 mg Intravenous $Given 11/17/23 2308)     Labs Ordered and Resulted from Time of ED Arrival to Time of ED Departure   COMPREHENSIVE METABOLIC PANEL - Abnormal       Result Value    Sodium 139      Potassium 4.2      Carbon Dioxide (CO2) 24      Anion Gap 10      Urea Nitrogen 13.2      Creatinine 0.50 (*)     GFR Estimate >90      Calcium 9.1      Chloride 105      Glucose 109 (*)     Alkaline Phosphatase 72      AST 45      ALT 27      Protein Total 7.7      Albumin 4.7      Bilirubin Total 4.1 (*)    RETICULOCYTE COUNT - Abnormal    % Reticulocyte 21.1 (*)     Absolute Reticulocyte 0.670 (*)    CBC WITH PLATELETS AND DIFFERENTIAL - Abnormal    WBC Count 12.0 (*)     RBC Count 3.11 (*)     Hemoglobin 10.4 (*)     Hematocrit 29.1 (*)     MCV 94      MCH 33.4 (*)     MCHC 35.7      RDW 23.0 (*)     Platelet Count 458 (*)     % Neutrophils 73      % Lymphocytes 15      % Monocytes 7      % Eosinophils 4      % Basophils 1      % Immature Granulocytes 0      NRBCs per 100 WBC 4 (*)     Absolute Neutrophils 8.8 (*)     Absolute Lymphocytes 1.8      Absolute Monocytes 0.9      Absolute Eosinophils 0.5      Absolute Basophils 0.1      Absolute Immature Granulocytes 0.1      Absolute NRBCs 0.4       No orders to display          Critical care was not performed.     Medical Decision Making  The patient's presentation was of high complexity (a chronic illness severe exacerbation, progression, or side  effect of treatment).    The patient's evaluation involved:  review of external note(s) from 2 sources (see separate area of note for details)  review of 3+ test result(s) ordered prior to this encounter (see separate area of note for details)  ordering and/or review of 3+ test(s) in this encounter (see separate area of note for details)    The patient's management necessitated high risk (a parenteral controlled substance).    Assessment & Plan    Patient presents for the above complaints.  On my evaluation she is alert, cooperative, appears to be in some pain.  Vital signs are within normal limits.  I am not concerned about acute chest syndrome as she has no symptoms of this clinically. Patient does have a very clear care plan which we did follow.  I do not have any concern at this point for infection or acute chest syndrome clinically.  We did access her port and we did check basic labs.  CBC shows a hemoglobin of 10.4, higher than her baseline (she had just received 2 units of packed red blood cells 2 days ago), white count of 12.0.  Platelets are 458, CMP essentially within normal limits with the exception of a bilirubin of 4.1, likely reflective of her acute sickling, reticulocyte count 21.1% so there is certainly no evidence of an aplastic crisis.  We have ordered LR at maintenance per her care plan.  Toradol 30 mg IV x1 ordered as well as Zofran 8 mg IV.  Nausea, and Dilaudid 1 mg IV every hour up to 3 doses.  After this, the patient feels much improved. She will be instructed to follow up with hematology and take her regular meds.  Return for new or worsening symptoms patient verbalizes understanding. .    I have reviewed the nursing notes. I have reviewed the findings, diagnosis, plan and need for follow up with the patient.    New Prescriptions    No medications on file       Final diagnoses:   Sickle cell pain crisis (H)       Leslie Roper MD    I, Quang Virgen, am serving as a trained medical  scribe to document services personally performed by Leslie Roper MD based on the provider's statements to me on November 17, 2023.  This document has been checked and approved by the attending provider.    I, Leslie Roper MD, was physically present and have reviewed and verified the accuracy of this note documented by Quang Virgen, medical scribe.      Leslie Roper MD    [facility] EMERGENCY DEPARTMENT  11/17/2023     Leslie Roper MD  11/18/23 0043

## 2023-11-18 NOTE — ED TRIAGE NOTES
Patient reports she had a blood transfusion a few days ago and that she always gets worsened pain following transfusions. Patient reports she took her pain medications, her muscle relaxers, and hot showers and nothing was working. Patient reports her pain is focused in her arms and it is becoming hard for her to pick things up. Patient has history of clots / pulmonary emboli / cerebral infarctions.     Patient reports she tried to go to the blood disorder and cancer infusion center off [address] so she would not have to go here but reports they did not have any room for her.

## 2023-11-18 NOTE — DISCHARGE INSTRUCTIONS
You have been seen in the emergency department today for your symptoms. Your hemoglobin is 10.4. You have received your protocol here in the ER and you are now feeling much improved. Please continue to take your regular medications and follow up with your hematology clinic. Return to the ER for worsening symptoms.

## 2023-11-20 ENCOUNTER — INFUSION THERAPY VISIT (OUTPATIENT)
Dept: ONCOLOGY | Facility: CLINIC | Age: 23
End: 2023-11-20
Attending: PEDIATRICS
Payer: COMMERCIAL

## 2023-11-20 ENCOUNTER — NURSE TRIAGE (OUTPATIENT)
Dept: ONCOLOGY | Facility: CLINIC | Age: 23
End: 2023-11-20
Payer: COMMERCIAL

## 2023-11-20 ENCOUNTER — PATIENT OUTREACH (OUTPATIENT)
Dept: ONCOLOGY | Facility: CLINIC | Age: 23
End: 2023-11-20

## 2023-11-20 ENCOUNTER — PATIENT OUTREACH (OUTPATIENT)
Dept: CARE COORDINATION | Facility: CLINIC | Age: 23
End: 2023-11-20
Payer: COMMERCIAL

## 2023-11-20 VITALS
HEART RATE: 95 BPM | DIASTOLIC BLOOD PRESSURE: 78 MMHG | OXYGEN SATURATION: 91 % | RESPIRATION RATE: 18 BRPM | TEMPERATURE: 97.5 F | SYSTOLIC BLOOD PRESSURE: 118 MMHG

## 2023-11-20 DIAGNOSIS — R07.1 CHEST PAIN ON BREATHING: Primary | ICD-10-CM

## 2023-11-20 DIAGNOSIS — I27.82 CHRONIC PULMONARY EMBOLISM WITHOUT ACUTE COR PULMONALE, UNSPECIFIED PULMONARY EMBOLISM TYPE (H): ICD-10-CM

## 2023-11-20 DIAGNOSIS — D57.00 SICKLE CELL PAIN CRISIS (H): Primary | ICD-10-CM

## 2023-11-20 DIAGNOSIS — J45.40 MODERATE PERSISTENT ASTHMA WITHOUT COMPLICATION: ICD-10-CM

## 2023-11-20 DIAGNOSIS — G81.10 SPASTIC HEMIPLEGIA, UNSPECIFIED ETIOLOGY, UNSPECIFIED LATERALITY (H): ICD-10-CM

## 2023-11-20 PROCEDURE — 96374 THER/PROPH/DIAG INJ IV PUSH: CPT

## 2023-11-20 PROCEDURE — 96361 HYDRATE IV INFUSION ADD-ON: CPT

## 2023-11-20 PROCEDURE — 258N000003 HC RX IP 258 OP 636: Performed by: PEDIATRICS

## 2023-11-20 PROCEDURE — 96376 TX/PRO/DX INJ SAME DRUG ADON: CPT

## 2023-11-20 PROCEDURE — 96375 TX/PRO/DX INJ NEW DRUG ADDON: CPT

## 2023-11-20 PROCEDURE — 250N000013 HC RX MED GY IP 250 OP 250 PS 637: Performed by: PEDIATRICS

## 2023-11-20 PROCEDURE — 250N000011 HC RX IP 250 OP 636: Performed by: PEDIATRICS

## 2023-11-20 RX ORDER — HEPARIN SODIUM,PORCINE 10 UNIT/ML
5 VIAL (ML) INTRAVENOUS
Status: CANCELLED | OUTPATIENT
Start: 2024-01-01

## 2023-11-20 RX ORDER — DIPHENHYDRAMINE HCL 25 MG
25 CAPSULE ORAL
Status: COMPLETED | OUTPATIENT
Start: 2023-11-20 | End: 2023-11-20

## 2023-11-20 RX ORDER — ONDANSETRON 4 MG/1
8 TABLET, ORALLY DISINTEGRATING ORAL
Status: COMPLETED | OUTPATIENT
Start: 2023-11-20 | End: 2023-11-20

## 2023-11-20 RX ORDER — DIPHENHYDRAMINE HCL 25 MG
25 CAPSULE ORAL
Status: CANCELLED
Start: 2024-01-01

## 2023-11-20 RX ORDER — ONDANSETRON 4 MG/1
8 TABLET, FILM COATED ORAL
Status: CANCELLED
Start: 2024-01-01

## 2023-11-20 RX ORDER — HEPARIN SODIUM (PORCINE) LOCK FLUSH IV SOLN 100 UNIT/ML 100 UNIT/ML
5 SOLUTION INTRAVENOUS
Status: DISCONTINUED | OUTPATIENT
Start: 2023-11-20 | End: 2023-11-20 | Stop reason: HOSPADM

## 2023-11-20 RX ORDER — KETOROLAC TROMETHAMINE 30 MG/ML
30 INJECTION, SOLUTION INTRAMUSCULAR; INTRAVENOUS ONCE
Status: CANCELLED
Start: 2024-01-01 | End: 2024-01-01

## 2023-11-20 RX ORDER — HEPARIN SODIUM (PORCINE) LOCK FLUSH IV SOLN 100 UNIT/ML 100 UNIT/ML
5 SOLUTION INTRAVENOUS
Status: CANCELLED | OUTPATIENT
Start: 2024-01-01

## 2023-11-20 RX ORDER — KETOROLAC TROMETHAMINE 30 MG/ML
30 INJECTION, SOLUTION INTRAMUSCULAR; INTRAVENOUS ONCE
Status: COMPLETED | OUTPATIENT
Start: 2023-11-20 | End: 2023-11-20

## 2023-11-20 RX ADMIN — HYDROMORPHONE HYDROCHLORIDE 1 MG: 1 INJECTION, SOLUTION INTRAMUSCULAR; INTRAVENOUS; SUBCUTANEOUS at 08:39

## 2023-11-20 RX ADMIN — ONDANSETRON 8 MG: 4 TABLET, ORALLY DISINTEGRATING ORAL at 08:40

## 2023-11-20 RX ADMIN — Medication 5 ML: at 11:01

## 2023-11-20 RX ADMIN — DIPHENHYDRAMINE HYDROCHLORIDE 25 MG: 25 CAPSULE ORAL at 08:40

## 2023-11-20 RX ADMIN — HYDROMORPHONE HYDROCHLORIDE 1 MG: 1 INJECTION, SOLUTION INTRAMUSCULAR; INTRAVENOUS; SUBCUTANEOUS at 10:42

## 2023-11-20 RX ADMIN — HYDROMORPHONE HYDROCHLORIDE 1 MG: 1 INJECTION, SOLUTION INTRAMUSCULAR; INTRAVENOUS; SUBCUTANEOUS at 09:42

## 2023-11-20 RX ADMIN — SODIUM CHLORIDE, POTASSIUM CHLORIDE, SODIUM LACTATE AND CALCIUM CHLORIDE 1000 ML: 600; 310; 30; 20 INJECTION, SOLUTION INTRAVENOUS at 08:32

## 2023-11-20 RX ADMIN — KETOROLAC TROMETHAMINE 30 MG: 30 INJECTION, SOLUTION INTRAMUSCULAR; INTRAVENOUS at 08:38

## 2023-11-20 NOTE — PATIENT INSTRUCTIONS
[facility] Triage and after hours / weekends / holidays:  [phone]    Please call the triage or after hours line if you experience a temperature greater than or equal to 100.4, shaking chills, have uncontrolled nausea, vomiting and/or diarrhea, dizziness, shortness of breath, chest pain, bleeding, unexplained bruising, or if you have any other new/concerning symptoms, questions or concerns.      If you are having any concerning symptoms or wish to speak to a provider before your next infusion visit, please call triage to notify them so we can adequately serve you.     If you need a refill on a narcotic prescription or other medication, please call before your infusion appointment.

## 2023-11-20 NOTE — PROGRESS NOTES
"Infusion Nursing Note:  Jennifer Cervantes presents today for IVF/Pain medications.    Patient seen by provider today: No   present during visit today: Not Applicable.    Note: Jennifer comes into the clinic today rating her pain \"all over\" as a 10/10. IV pain medication given. By end of infusion, patient rates her pain as 4/10 and feels comfortable discharging home at this pain level. She also mentions general fatigue and SOB since her labs blood transfusion. She has been calling into work sick due to her inability to stand for long periods of time. Patricia Mantilla CNP was notified and patient will be seen next Monday for labs and provider visit to assess these symptoms.       Intravenous Access:  Implanted Port.    Treatment Conditions:  Not Applicable.      Post Infusion Assessment:  Patient tolerated infusion without incident.  Blood return noted pre and post infusion.  Site patent and intact, free from redness, edema or discomfort.  No evidence of extravasations.  Access discontinued per protocol.       Discharge Plan:   Patient declined prescription refills.  Discharge instructions reviewed with: Patient.  Patient and/or family verbalized understanding of discharge instructions and all questions answered.  AVS to patient via [employer].  Patient will return 12/1 for next appointment.   Patient discharged in stable condition accompanied by: self.  Departure Mode: Ambulatory.      Lien Petersen RN  "

## 2023-11-20 NOTE — TELEPHONE ENCOUNTER
Oncology Nurse Triage - Sickle Cell Pain Crisis:    Situation: Jennifer  calling about Sickle Cell Pain Crisis, requesting to be added on for IV fluids and pain medicine    Background:     Patient's last infusion was 11/15/23  Last clinic visit date:11/3/2023  Does patient have active treatment plan?  Yes      Assessment of Symptoms:  Onset/Duration of symptoms: 3 day    Is it typical sickle cell pain? Yes   Location: general  Character: Sharp           Intensity: 9/10    Any radiation of pain, numbness, tingling, weakness, warmth, swelling, discoloration of arms or legs?No     Fever?No  (if yes max temperature recorded in last 24 hours):      Chest Pain Present: No     Shortness of breath: No     Other home therapies tried: HEAT/HEATING PAD and WARM BATH     Last home medication taken and when: 6:00am    Any Refills Needed?: No     Does patient have transportation & length of time to get to clinic: No         Recommendations:   Meets protocol  Appt available for 8:00am,  0730 Jennifer is able to get ride and is on way.     0735 Scheduling request sent for appt at 8:000am

## 2023-11-20 NOTE — PROGRESS NOTES
Social Work - Transportation  [facility]    Data/Intervention:  Patient Name: Jennifer Cervantes Goes By: Jennifer    /Age: 2000 (23 year old)    [facility]  Reason for Referral:  support requested for patient transportation needs for today's appointment.  Assessment:  called [employer] to arrange ride through patient's insurance. [employer] arranged ride home with [employer]. Patient will need to call [phone] when ready for return ride home.  Plan: Patient is aware of the transportation plan.  available to assist with any other needs.  CARLOS Chavez,LGUDAY  Hematology/Oncology Social Worker  Phone:[phone] Pager: [phone]

## 2023-11-21 ENCOUNTER — INFUSION THERAPY VISIT (OUTPATIENT)
Dept: INFUSION THERAPY | Facility: CLINIC | Age: 23
End: 2023-11-21
Attending: PEDIATRICS
Payer: COMMERCIAL

## 2023-11-21 ENCOUNTER — NURSE TRIAGE (OUTPATIENT)
Dept: ONCOLOGY | Facility: CLINIC | Age: 23
End: 2023-11-21
Payer: COMMERCIAL

## 2023-11-21 ENCOUNTER — HOSPITAL ENCOUNTER (OUTPATIENT)
Dept: GENERAL RADIOLOGY | Facility: CLINIC | Age: 23
Discharge: HOME | End: 2023-11-21
Attending: PEDIATRICS
Payer: COMMERCIAL

## 2023-11-21 ENCOUNTER — HOSPITAL ENCOUNTER (OUTPATIENT)
Dept: NUCLEAR MEDICINE | Facility: CLINIC | Age: 23
Setting detail: NUCLEAR MEDICINE
Discharge: HOME | End: 2023-11-21
Attending: PEDIATRICS | Admitting: PEDIATRICS
Payer: COMMERCIAL

## 2023-11-21 ENCOUNTER — PATIENT OUTREACH (OUTPATIENT)
Dept: CARE COORDINATION | Facility: CLINIC | Age: 23
End: 2023-11-21
Payer: COMMERCIAL

## 2023-11-21 VITALS
HEART RATE: 90 BPM | DIASTOLIC BLOOD PRESSURE: 85 MMHG | RESPIRATION RATE: 16 BRPM | OXYGEN SATURATION: 95 % | SYSTOLIC BLOOD PRESSURE: 124 MMHG | TEMPERATURE: 98.5 F

## 2023-11-21 DIAGNOSIS — D57.00 SICKLE CELL PAIN CRISIS (H): Primary | ICD-10-CM

## 2023-11-21 DIAGNOSIS — R07.1 CHEST PAIN ON BREATHING: ICD-10-CM

## 2023-11-21 DIAGNOSIS — G81.10 SPASTIC HEMIPLEGIA, UNSPECIFIED ETIOLOGY, UNSPECIFIED LATERALITY (H): ICD-10-CM

## 2023-11-21 PROCEDURE — A9567 TECHNETIUM TC-99M AEROSOL: HCPCS | Performed by: PEDIATRICS

## 2023-11-21 PROCEDURE — 272N000035 NM LUNG SCAN VENTILATION AND PERFUSION

## 2023-11-21 PROCEDURE — 78582 LUNG VENTILAT&PERFUS IMAGING: CPT | Mod: 26 | Performed by: RADIOLOGY

## 2023-11-21 PROCEDURE — 250N000013 HC RX MED GY IP 250 OP 250 PS 637: Performed by: PEDIATRICS

## 2023-11-21 PROCEDURE — 250N000011 HC RX IP 250 OP 636: Mod: JZ | Performed by: PEDIATRICS

## 2023-11-21 PROCEDURE — 96375 TX/PRO/DX INJ NEW DRUG ADDON: CPT

## 2023-11-21 PROCEDURE — 258N000003 HC RX IP 258 OP 636: Performed by: PEDIATRICS

## 2023-11-21 PROCEDURE — 96374 THER/PROPH/DIAG INJ IV PUSH: CPT

## 2023-11-21 PROCEDURE — 71046 X-RAY EXAM CHEST 2 VIEWS: CPT | Mod: 26 | Performed by: RADIOLOGY

## 2023-11-21 PROCEDURE — A9540 TC99M MAA: HCPCS | Performed by: PEDIATRICS

## 2023-11-21 PROCEDURE — 96376 TX/PRO/DX INJ SAME DRUG ADON: CPT

## 2023-11-21 PROCEDURE — 96361 HYDRATE IV INFUSION ADD-ON: CPT

## 2023-11-21 PROCEDURE — 999N000127 HC STATISTIC PERIPHERAL IV START W US GUIDANCE

## 2023-11-21 PROCEDURE — 343N000001 HC RX 343: Performed by: PEDIATRICS

## 2023-11-21 PROCEDURE — 71046 X-RAY EXAM CHEST 2 VIEWS: CPT

## 2023-11-21 RX ORDER — KETOROLAC TROMETHAMINE 30 MG/ML
30 INJECTION, SOLUTION INTRAMUSCULAR; INTRAVENOUS ONCE
Status: CANCELLED
Start: 2024-01-01 | End: 2024-01-01

## 2023-11-21 RX ORDER — HEPARIN SODIUM,PORCINE 10 UNIT/ML
5 VIAL (ML) INTRAVENOUS
Status: CANCELLED | OUTPATIENT
Start: 2024-01-01

## 2023-11-21 RX ORDER — KETOROLAC TROMETHAMINE 30 MG/ML
30 INJECTION, SOLUTION INTRAMUSCULAR; INTRAVENOUS ONCE
Status: COMPLETED | OUTPATIENT
Start: 2023-11-21 | End: 2023-11-21

## 2023-11-21 RX ORDER — HEPARIN SODIUM (PORCINE) LOCK FLUSH IV SOLN 100 UNIT/ML 100 UNIT/ML
5 SOLUTION INTRAVENOUS
Status: CANCELLED | OUTPATIENT
Start: 2024-01-01

## 2023-11-21 RX ORDER — DIPHENHYDRAMINE HCL 25 MG
25 CAPSULE ORAL
Status: COMPLETED | OUTPATIENT
Start: 2023-11-21 | End: 2023-11-21

## 2023-11-21 RX ORDER — HEPARIN SODIUM (PORCINE) LOCK FLUSH IV SOLN 100 UNIT/ML 100 UNIT/ML
5 SOLUTION INTRAVENOUS
Status: DISCONTINUED | OUTPATIENT
Start: 2023-11-21 | End: 2023-11-22 | Stop reason: HOSPADM

## 2023-11-21 RX ORDER — DIPHENHYDRAMINE HCL 25 MG
25 CAPSULE ORAL
Status: CANCELLED
Start: 2024-01-01

## 2023-11-21 RX ORDER — ONDANSETRON 8 MG/1
8 TABLET, ORALLY DISINTEGRATING ORAL
Status: COMPLETED | OUTPATIENT
Start: 2023-11-21 | End: 2023-11-21

## 2023-11-21 RX ORDER — ONDANSETRON 4 MG/1
8 TABLET, FILM COATED ORAL
Status: CANCELLED
Start: 2024-01-01

## 2023-11-21 RX ADMIN — DIPHENHYDRAMINE HYDROCHLORIDE 25 MG: 25 CAPSULE ORAL at 14:02

## 2023-11-21 RX ADMIN — KIT FOR THE PREPARATION OF TECHNETIUM TC 99M ALBUMIN AGGREGATED 7.2 MILLICURIE: 2.5 INJECTION, POWDER, FOR SOLUTION INTRAVENOUS at 10:14

## 2023-11-21 RX ADMIN — SODIUM CHLORIDE, POTASSIUM CHLORIDE, SODIUM LACTATE AND CALCIUM CHLORIDE 1000 ML: 600; 310; 30; 20 INJECTION, SOLUTION INTRAVENOUS at 14:02

## 2023-11-21 RX ADMIN — HYDROMORPHONE HYDROCHLORIDE 1 MG: 1 INJECTION, SOLUTION INTRAMUSCULAR; INTRAVENOUS; SUBCUTANEOUS at 16:15

## 2023-11-21 RX ADMIN — HYDROMORPHONE HYDROCHLORIDE 1 MG: 1 INJECTION, SOLUTION INTRAMUSCULAR; INTRAVENOUS; SUBCUTANEOUS at 15:10

## 2023-11-21 RX ADMIN — KIT FOR THE PREPARATION OF TECHNETIUM TC 99M PENTETATE 2 MILLICURIE: 20 INJECTION, POWDER, LYOPHILIZED, FOR SOLUTION INTRAVENOUS; RESPIRATORY (INHALATION) at 10:06

## 2023-11-21 RX ADMIN — ONDANSETRON 8 MG: 8 TABLET, ORALLY DISINTEGRATING ORAL at 14:17

## 2023-11-21 RX ADMIN — HYDROMORPHONE HYDROCHLORIDE 1 MG: 1 INJECTION, SOLUTION INTRAMUSCULAR; INTRAVENOUS; SUBCUTANEOUS at 14:03

## 2023-11-21 RX ADMIN — KETOROLAC TROMETHAMINE 30 MG: 30 INJECTION, SOLUTION INTRAMUSCULAR; INTRAVENOUS at 14:03

## 2023-11-21 RX ADMIN — Medication 5 ML: at 16:31

## 2023-11-21 NOTE — TELEPHONE ENCOUNTER
0856 Available time with [facility] at 2pm.     0924 Call to pt to confirm she is able to make appt. LVM requesting pt return call.    1042 Pt calling back to confirm she is able to make appt at 2 PM. CCOD and SW notified to assist with scheduling and transportation.

## 2023-11-21 NOTE — PROGRESS NOTES
Nursing Note  Jennifer Cervantes presents today to [facility] for:   Chief Complaint   Patient presents with    Infusion     IVF/pain meds     During today's [facility] appointment, orders from Dr. Duncan were completed.  Frequency: as needed    Progress note:  Patient identification verified by name and date of birth.  Assessment completed.  Vitals recorded in Doc Flowsheets.  Patient was provided with education regarding medication/procedure and possible side effects.  Patient verbalized understanding.     present during visit today: Not Applicable.    Treatment Conditions: Ok'd by triage for infusion.    Premedications: administered per order.    Infusion length and rate:  infusion given over approximately  2 hours    Labs: were not ordered for this appointment.    Vascular access: port accessed today.    Is the next appt scheduled? no    Post Infusion Assessment:  Patient tolerated infusion without incident.     Discharge Plan:   Follow up plan of care with: primary care provider,  Discharge instructions were reviewed with patient.  Patient/representative verbalized understanding of discharge instructions and all questions answered.  Patient discharged from [facility] in stable condition.    Patricia Qureshi RN    Administrations This Visit       diphenhydrAMINE (BENADRYL) capsule 25 mg       Admin Date  11/21/2023 Action  $Given Dose  25 mg Route  Oral Documented By  Patricia Qureshi RN              heparin 100 unit/mL injection 5 mL       Admin Date  11/21/2023 Action  $Given Dose  5 mL Route  Intracatheter Documented By  Patricia Qureshi RN              HYDROmorphone (DILAUDID) injection 1 mg       Admin Date  11/21/2023 Action  $Given Dose  1 mg Route  Intravenous Documented By  Patricia Qureshi RN               Admin Date  11/21/2023 Action  $Given Dose  1 mg Route  Intravenous Documented By  Patricia Qureshi  RN               Admin Date  11/21/2023 Action  $Given Dose  1 mg Route  Intravenous Documented By  Tricia Adame RN              ketorolac (TORADOL) injection 30 mg       Admin Date  11/21/2023 Action  $Given Dose  30 mg Route  Intravenous Documented By  Patricia Qureshi RN              lactated ringers BOLUS 1,000 mL       Admin Date  11/21/2023 Action  $New Bag Dose  1,000 mL Rate  500 mL/hr Route  Intravenous Documented By  Patricia Qureshi RN              ondansetron (ZOFRAN ODT) ODT tab 8 mg       Admin Date  11/21/2023 Action  $Given Dose  8 mg Route  Oral Documented By  Patricia Qureshi RN                    /85 (BP Location: Left arm)   Pulse 90   Temp 98.5  F (36.9  C) (Oral)   Resp 16   SpO2 95%

## 2023-11-21 NOTE — TELEPHONE ENCOUNTER
Oncology Nurse Triage - Sickle Cell Pain Crisis:    Situation: Jennifer  calling about Sickle Cell Pain Crisis, requesting to be added on for IV fluids and pain medicine    Background:     Patient's last infusion was 11/20/23  Last clinic visit date:11/3/2023  Does patient have active treatment plan?  Yes      Assessment of Symptoms:  Onset/Duration of symptoms: 2 day    Is it typical sickle cell pain? Yes   Location: Everywhere  Character: Sharp           Intensity: 10/10    Any radiation of pain, numbness, tingling, weakness, warmth, swelling, discoloration of arms or legs?No     Fever?No  (if yes max temperature recorded in last 24 hours):      Chest Pain Present: No     Shortness of breath: No     Other home therapies tried: HEAT/HEATING PAD and WARM BATH     Last home medication taken and when: Oxycodone 0600    Any Refills Needed?: No     Does patient have transportation & length of time to get to clinic: Yes, has transportation. Has appt at 1000.        Recommendations:     Added to infusion wait list for IVF/pain meds per protocol.      If you do not hear from the infusion center by 2pm then you will not be able to get in for an infusion today. If symptoms worsen while waiting for call back, and/or you experience fever, chills, SOB, chest pain, cough, n/v, dizziness, numbness, swelling, discoloration of extremities, then seek emergency evaluation in Emergency Department.     Please note, if you are late for your appt, you risk losing your infusion appt as it may delay another patient's infusion who arrived on time.

## 2023-11-21 NOTE — PROGRESS NOTES
Social Work - Transportation  [facility]    Data/Intervention:  Patient Name: Jennifer Cervantes Goes By: Jennifer    /Age: 2000 (23 year old)  Referral From: [facility]    Reason for Referral:  support requested for patient transportation needs for today's appointment at 2 pm.  Assessment:  called [employer] to arrange ride through patient's insurance. [employer] arranged  for patient from home with [employer]. Patient will need to call [phone] when ready for return ride home.  Plan: Patient is aware of the transportation plan.  available to assist with any other needs.    CARLOS Chavez,SW  Hematology/Oncology Social Worker  Phone:[phone] Pager: [phone]

## 2023-11-24 ENCOUNTER — HOSPITAL ENCOUNTER (EMERGENCY)
Facility: CLINIC | Age: 23
Discharge: HOME | End: 2023-11-24
Attending: EMERGENCY MEDICINE | Admitting: EMERGENCY MEDICINE
Payer: COMMERCIAL

## 2023-11-24 ENCOUNTER — NURSE TRIAGE (OUTPATIENT)
Dept: ONCOLOGY | Facility: CLINIC | Age: 23
End: 2023-11-24
Payer: COMMERCIAL

## 2023-11-24 VITALS
OXYGEN SATURATION: 93 % | BODY MASS INDEX: 24.89 KG/M2 | DIASTOLIC BLOOD PRESSURE: 86 MMHG | HEIGHT: 64 IN | SYSTOLIC BLOOD PRESSURE: 108 MMHG | HEART RATE: 77 BPM | TEMPERATURE: 98.3 F | WEIGHT: 145.8 LBS | RESPIRATION RATE: 12 BRPM

## 2023-11-24 DIAGNOSIS — D57.00 SICKLE CELL PAIN CRISIS (H): ICD-10-CM

## 2023-11-24 LAB
ALBUMIN SERPL BCG-MCNC: 4.6 G/DL (ref 3.5–5.2)
ALP SERPL-CCNC: 67 U/L (ref 40–150)
ALT SERPL W P-5'-P-CCNC: 30 U/L (ref 0–50)
ANION GAP SERPL CALCULATED.3IONS-SCNC: 8 MMOL/L (ref 7–15)
AST SERPL W P-5'-P-CCNC: 50 U/L (ref 0–45)
BASOPHILS # BLD AUTO: 0.3 10E3/UL (ref 0–0.2)
BASOPHILS NFR BLD AUTO: 3 %
BILIRUB SERPL-MCNC: 3.4 MG/DL
BUN SERPL-MCNC: 8.2 MG/DL (ref 6–20)
CALCIUM SERPL-MCNC: 9.3 MG/DL (ref 8.6–10)
CHLORIDE SERPL-SCNC: 106 MMOL/L (ref 98–107)
CREAT SERPL-MCNC: 0.52 MG/DL (ref 0.51–0.95)
DEPRECATED HCO3 PLAS-SCNC: 25 MMOL/L (ref 22–29)
EGFRCR SERPLBLD CKD-EPI 2021: >90 ML/MIN/1.73M2
EOSINOPHIL # BLD AUTO: 0.7 10E3/UL (ref 0–0.7)
EOSINOPHIL NFR BLD AUTO: 6 %
ERYTHROCYTE [DISTWIDTH] IN BLOOD BY AUTOMATED COUNT: 21.8 % (ref 10–15)
GLUCOSE SERPL-MCNC: 89 MG/DL (ref 70–99)
HCG INTACT+B SERPL-ACNC: <1 MIU/ML
HCT VFR BLD AUTO: 21.9 % (ref 35–47)
HGB BLD-MCNC: 7.7 G/DL (ref 11.7–15.7)
IMM GRANULOCYTES # BLD: 0.1 10E3/UL
IMM GRANULOCYTES NFR BLD: 0 %
LYMPHOCYTES # BLD AUTO: 2.4 10E3/UL (ref 0.8–5.3)
LYMPHOCYTES NFR BLD AUTO: 19 %
MCH RBC QN AUTO: 32 PG (ref 26.5–33)
MCHC RBC AUTO-ENTMCNC: 35.2 G/DL (ref 31.5–36.5)
MCV RBC AUTO: 91 FL (ref 78–100)
MONOCYTES # BLD AUTO: 1.2 10E3/UL (ref 0–1.3)
MONOCYTES NFR BLD AUTO: 10 %
NEUTROPHILS # BLD AUTO: 7.7 10E3/UL (ref 1.6–8.3)
NEUTROPHILS NFR BLD AUTO: 62 %
NRBC # BLD AUTO: 0.2 10E3/UL
NRBC BLD AUTO-RTO: 2 /100
PLATELET # BLD AUTO: 459 10E3/UL (ref 150–450)
POTASSIUM SERPL-SCNC: 4 MMOL/L (ref 3.4–5.3)
PROT SERPL-MCNC: 7.2 G/DL (ref 6.4–8.3)
RBC # BLD AUTO: 2.41 10E6/UL (ref 3.8–5.2)
RETICS # AUTO: 0.42 10E6/UL (ref 0.03–0.1)
RETICS/RBC NFR AUTO: 17.3 % (ref 0.5–2)
SODIUM SERPL-SCNC: 139 MMOL/L (ref 135–145)
WBC # BLD AUTO: 12.3 10E3/UL (ref 4–11)

## 2023-11-24 PROCEDURE — 96376 TX/PRO/DX INJ SAME DRUG ADON: CPT | Performed by: EMERGENCY MEDICINE

## 2023-11-24 PROCEDURE — 99284 EMERGENCY DEPT VISIT MOD MDM: CPT | Mod: 25 | Performed by: EMERGENCY MEDICINE

## 2023-11-24 PROCEDURE — 258N000003 HC RX IP 258 OP 636: Performed by: EMERGENCY MEDICINE

## 2023-11-24 PROCEDURE — 85025 COMPLETE CBC W/AUTO DIFF WBC: CPT | Performed by: EMERGENCY MEDICINE

## 2023-11-24 PROCEDURE — 96361 HYDRATE IV INFUSION ADD-ON: CPT | Performed by: EMERGENCY MEDICINE

## 2023-11-24 PROCEDURE — 96374 THER/PROPH/DIAG INJ IV PUSH: CPT | Performed by: EMERGENCY MEDICINE

## 2023-11-24 PROCEDURE — 85045 AUTOMATED RETICULOCYTE COUNT: CPT | Performed by: EMERGENCY MEDICINE

## 2023-11-24 PROCEDURE — 250N000011 HC RX IP 250 OP 636: Mod: JZ | Performed by: EMERGENCY MEDICINE

## 2023-11-24 PROCEDURE — 84702 CHORIONIC GONADOTROPIN TEST: CPT | Performed by: EMERGENCY MEDICINE

## 2023-11-24 PROCEDURE — 80053 COMPREHEN METABOLIC PANEL: CPT | Performed by: EMERGENCY MEDICINE

## 2023-11-24 PROCEDURE — 36415 COLL VENOUS BLD VENIPUNCTURE: CPT | Performed by: EMERGENCY MEDICINE

## 2023-11-24 PROCEDURE — 96375 TX/PRO/DX INJ NEW DRUG ADDON: CPT | Performed by: EMERGENCY MEDICINE

## 2023-11-24 PROCEDURE — 99284 EMERGENCY DEPT VISIT MOD MDM: CPT | Performed by: EMERGENCY MEDICINE

## 2023-11-24 RX ORDER — OXYCODONE HYDROCHLORIDE 10 MG/1
10 TABLET ORAL EVERY 4 HOURS PRN
Qty: 25 TABLET | Refills: 0 | Status: SHIPPED | OUTPATIENT
Start: 2023-11-24 | End: 2023-11-27

## 2023-11-24 RX ORDER — KETOROLAC TROMETHAMINE 30 MG/ML
30 INJECTION, SOLUTION INTRAMUSCULAR; INTRAVENOUS ONCE
Status: COMPLETED | OUTPATIENT
Start: 2023-11-24 | End: 2023-11-24

## 2023-11-24 RX ORDER — HEPARIN SODIUM (PORCINE) LOCK FLUSH IV SOLN 100 UNIT/ML 100 UNIT/ML
5-10 SOLUTION INTRAVENOUS
Status: DISCONTINUED | OUTPATIENT
Start: 2023-11-24 | End: 2023-11-24 | Stop reason: HOSPADM

## 2023-11-24 RX ORDER — SODIUM CHLORIDE, SODIUM LACTATE, POTASSIUM CHLORIDE, CALCIUM CHLORIDE 600; 310; 30; 20 MG/100ML; MG/100ML; MG/100ML; MG/100ML
INJECTION, SOLUTION INTRAVENOUS CONTINUOUS
Status: DISCONTINUED | OUTPATIENT
Start: 2023-11-24 | End: 2023-11-24 | Stop reason: HOSPADM

## 2023-11-24 RX ADMIN — KETOROLAC TROMETHAMINE 30 MG: 30 INJECTION, SOLUTION INTRAMUSCULAR; INTRAVENOUS at 09:47

## 2023-11-24 RX ADMIN — HYDROMORPHONE HYDROCHLORIDE 1 MG: 1 INJECTION, SOLUTION INTRAMUSCULAR; INTRAVENOUS; SUBCUTANEOUS at 09:48

## 2023-11-24 RX ADMIN — HEPARIN 5 ML: 100 SYRINGE at 13:46

## 2023-11-24 RX ADMIN — HYDROMORPHONE HYDROCHLORIDE 1 MG: 1 INJECTION, SOLUTION INTRAMUSCULAR; INTRAVENOUS; SUBCUTANEOUS at 11:07

## 2023-11-24 RX ADMIN — HYDROMORPHONE HYDROCHLORIDE 1 MG: 1 INJECTION, SOLUTION INTRAMUSCULAR; INTRAVENOUS; SUBCUTANEOUS at 12:27

## 2023-11-24 RX ADMIN — SODIUM CHLORIDE, POTASSIUM CHLORIDE, SODIUM LACTATE AND CALCIUM CHLORIDE: 600; 310; 30; 20 INJECTION, SOLUTION INTRAVENOUS at 09:47

## 2023-11-24 ASSESSMENT — ACTIVITIES OF DAILY LIVING (ADL)
ADLS_ACUITY_SCORE: 35
ADLS_ACUITY_SCORE: 35

## 2023-11-24 NOTE — TELEPHONE ENCOUNTER
Pt calling to request to be taken off of wait list for IVF/Pain medications today. She states she will be going to ED.

## 2023-11-24 NOTE — DISCHARGE INSTRUCTIONS
Continue current medications.  Follow-up with your hematologist as planned.    Return emergency department if fever, cough, worse, or other concerns.

## 2023-11-24 NOTE — TELEPHONE ENCOUNTER
Narcotic Refill Request    Medication(s) requested:  Oxycodone 10 mg  Person Requesting Refill: Patient  What pain is the medication treating: Sickle Cell pain  How is the medication being taken?:Taking every 4 hrs  Does pt have enough for today? No  Is pain being adequately controlled on the current regimen?: Yes  Experiencing any side effects from medication?: No    Date of most recent appointment:  11/03/23 aida/Patricia Mantilla  Any No Show Visits: No  Next appointment:   11/27/23 w/Patricia Mantilla  Last fill date and by whom:  11/17/23 by Patricia Mantilla   Reviewed: No access    Routed/Paged provider: Patricia Mantilla

## 2023-11-24 NOTE — TELEPHONE ENCOUNTER
Oncology Nurse Triage - Sickle Cell Pain Crisis:    Situation: Jennifer  calling about Sickle Cell Pain Crisis, requesting to be added on for IV fluids and pain medicine    Background:     Patient's last infusion was 11/21/23  Last clinic visit date:11/03/23 w/Patricia Mantilla  Does patient have active treatment plan?  Yes      Assessment of Symptoms:  Onset/Duration of symptoms: 2 day    Is it typical sickle cell pain? Yes   Location: everywhere  Character: Sharp           Intensity: 9/10    Any radiation of pain, numbness, tingling, weakness, warmth, swelling, discoloration of arms or legs?No     Fever?No  (if yes max temperature recorded in last 24 hours):      Chest Pain Present: No     Shortness of breath: No     Other home therapies tried: HEAT/HEATING PAD     Last home medication taken and when: Oxycodone 0648    Any Refills Needed?: Yes     Does patient have transportation & length of time to get to clinic: No, needs help with transportation.         Recommendations:   Secure message to Patricia for approval.    0802 Patricia approving adding on wait list      Added to infusion wait list for IVF/pain meds per protocol.      Reviewed with pt if she does not hear from the infusion center by 2pm then you will not be able to get in for an infusion today. If symptoms worsen while waiting for call back, and/or you experience fever, chills, SOB, chest pain, cough, n/v, dizziness, numbness, swelling, discoloration of extremities, then seek emergency evaluation in Emergency Department.     Please note, if you are late for your appt, you risk losing your infusion appt as it may delay another patient's infusion who arrived on time.

## 2023-11-24 NOTE — ED PROVIDER NOTES
ED Provider Note  [facility]      History     Chief Complaint   Patient presents with    Sickle Cell Pain Crisis     Body pain all over.9/10 pain. Sickle cell crisis. No temp.     HPI  Jennifer Cervantes is a 23 year old female with a history of sickle cell disease and stroke with residual right-sided hemiplegia presents to the ED for sickle cell pain.  Patient states that she developed diffuse pain in her back and extremities yesterday.  She states that she has been taking her home medications without adequate control of her symptoms.  She feels that her pain is consistent with sickle cell disease exacerbation and feels it is likely related to the cold weather.  She denies any recent illness including fever, cough, dyspnea, chest pain, abdominal pain, nausea, vomiting, dysuria, or urgency.    Past Medical History  Past Medical History:   Diagnosis Date    Anxiety     Bleeding disorder (H24)     Blood clotting disorder (H24)     Cerebral infarction (H) 2015    Cognitive developmental delay     low IQ. 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 side (H)     right hand contractures    Iron overload due to repeated red blood cell transfusions     Migraines     Multiple subsegmental pulmonary emboli without acute cor pulmonale (H) 02/01/2021    Oppositional defiant behavior     Presence of intrauterine contraceptive device 2/18/2020    Superficial venous thrombosis of arm, right 03/25/2021    Uncomplicated asthma      Past Surgical History:   Procedure Laterality Date    AS INSERT TUNNELED CV 2 CATH W/O PORT/PUMP      CHOLECYSTECTOMY      CV RIGHT HEART CATH MEASUREMENTS RECORDED N/A 11/18/2021    Procedure: Right Heart Cath;  Surgeon: Jackson Stauffer MD;  Location:  HEART CARDIAC CATH LAB    INSERT PORT VASCULAR ACCESS Left 4/21/2021    Procedure: INSERTION, VASCULAR  ACCESS PORT (NOT SURE ON SIDE UNTIL REMOVAL);  Surgeon: Rajan More MD;  Location: [facility] OR    IR CHEST PORT PLACEMENT > 5 YRS OF AGE  4/21/2021    IR CVC NON TUNNEL LINE REMOVAL  5/6/2021    IR CVC NON TUNNEL PLACEMENT > 5 YRS  04/07/2020    IR CVC NON TUNNEL PLACEMENT > 5 YRS  4/30/2021    IR CVC NON TUNNEL PLACEMENT > 5 YRS  9/7/2022    IR PORT REMOVAL LEFT  4/21/2021    REMOVE PORT VASCULAR ACCESS Left 4/21/2021    Procedure: REMOVAL, VASCULAR ACCESS PORT LEFT;  Surgeon: Rajan More MD;  Location: [facility] OR    REPAIR TENDON ELBOW Right 10/02/2019    Procedure: Right Elbow Flexor Lengthening, Flexor Pronator Slide Of Wrist and Finger, Thumb Adductor Lengthening;  Surgeon: Anai Franco MD;  Location: [facility] OR    TONSILLECTOMY Bilateral 10/02/2019    Procedure: Bilateral Tonsillectomy;  Surgeon: Farhana Guy MD;  Location: [facility] OR    ZZC BREAST REDUCTION (INCLUDES LIPO) TIER 3 Bilateral 04/23/2019     acetaminophen (TYLENOL) 325 MG tablet  albuterol (PROAIR HFA/PROVENTIL HFA/VENTOLIN HFA) 108 (90 Base) MCG/ACT inhaler  albuterol (PROVENTIL) (2.5 MG/3ML) 0.083% neb solution  aspirin (ASA) 81 MG chewable tablet  budesonide-formoterol (SYMBICORT) 160-4.5 MCG/ACT Inhaler  cetirizine (ZYRTEC) 10 MG tablet  deferasirox (JADENU) 360 MG tablet  diphenhydrAMINE (BENADRYL) 25 MG capsule  EPINEPHrine (ANY BX GENERIC EQUIV) 0.3 MG/0.3ML injection 2-pack  Hydroxyurea 1000 MG TABS  methocarbamol (ROBAXIN) 750 MG tablet  naloxone (NARCAN) 4 MG/0.1ML nasal spray  ondansetron (ZOFRAN) 8 MG tablet  oxyCODONE IR (ROXICODONE) 10 MG tablet  predniSONE (DELTASONE) 20 MG tablet      Allergies   Allergen Reactions    Contrast Dye      Hives and breathing issues    Fish-Derived Products Hives    Seafood Hives    Adhesive Tape Hives     Primipore dressing causes hives    Gadolinium     Iodinated Contrast Media      Family History  Family History   Problem Relation Age of Onset    Sickle Cell Trait Mother      "Hypertension Mother     Asthma Mother     Sickle Cell Trait Father     Glaucoma No family hx of     Macular Degeneration No family hx of     Diabetes No family hx of     Gout No family hx of      Social History   Social History     Tobacco Use    Smoking status: Never     Passive exposure: Never    Smokeless tobacco: Never   Substance Use Topics    Alcohol use: Not Currently     Alcohol/week: 0.0 standard drinks of alcohol    Drug use: Never         A medically appropriate review of systems was performed with pertinent positives and negatives noted in the HPI, and all other systems negative.    Physical Exam   BP: 105/71  Pulse: 83  Temp: 98.4  F (36.9  C)  Resp: 18  Height: 162.6 cm (5' 4\")  Weight: 66.1 kg (145 lb 12.8 oz)  SpO2: 98 %  Physical Exam  Vitals and nursing note reviewed.   Constitutional:       General: She is not in acute distress.     Appearance: Normal appearance. She is not diaphoretic.   HENT:      Head: Normocephalic and atraumatic.   Eyes:      Extraocular Movements: Extraocular movements intact.      Conjunctiva/sclera: Conjunctivae normal.   Cardiovascular:      Rate and Rhythm: Normal rate and regular rhythm.      Pulses: Normal pulses.      Heart sounds: Normal heart sounds.   Pulmonary:      Effort: Pulmonary effort is normal. No respiratory distress.      Breath sounds: Normal breath sounds.   Abdominal:      Palpations: Abdomen is soft.      Tenderness: There is no abdominal tenderness.   Musculoskeletal:         General: No tenderness. Normal range of motion.      Cervical back: Normal range of motion and neck supple.      Comments: Baseline range of motion.  Mild contractures of right upper and lower extremity related to hemiplegia.   Skin:     General: Skin is warm and dry.      Findings: No rash.   Neurological:      Mental Status: She is alert. Mental status is at baseline.      Comments: Right hemiplegia           ED Course, Procedures, & Data      Procedures             Results " for orders placed or performed during the hospital encounter of 11/24/23   Comprehensive metabolic panel     Status: Abnormal   Result Value Ref Range    Sodium 139 135 - 145 mmol/L    Potassium 4.0 3.4 - 5.3 mmol/L    Carbon Dioxide (CO2) 25 22 - 29 mmol/L    Anion Gap 8 7 - 15 mmol/L    Urea Nitrogen 8.2 6.0 - 20.0 mg/dL    Creatinine 0.52 0.51 - 0.95 mg/dL    GFR Estimate >90 >60 mL/min/1.73m2    Calcium 9.3 8.6 - 10.0 mg/dL    Chloride 106 98 - 107 mmol/L    Glucose 89 70 - 99 mg/dL    Alkaline Phosphatase 67 40 - 150 U/L    AST 50 (H) 0 - 45 U/L    ALT 30 0 - 50 U/L    Protein Total 7.2 6.4 - 8.3 g/dL    Albumin 4.6 3.5 - 5.2 g/dL    Bilirubin Total 3.4 (H) <=1.2 mg/dL   Reticulocyte count     Status: Abnormal   Result Value Ref Range    % Reticulocyte 17.3 (H) 0.5 - 2.0 %    Absolute Reticulocyte 0.417 (H) 0.025 - 0.095 10e6/uL   HCG quantitative pregnancy     Status: Normal   Result Value Ref Range    hCG Quantitative <1 <5 mIU/mL   CBC with platelets and differential     Status: Abnormal   Result Value Ref Range    WBC Count 12.3 (H) 4.0 - 11.0 10e3/uL    RBC Count 2.41 (L) 3.80 - 5.20 10e6/uL    Hemoglobin 7.7 (L) 11.7 - 15.7 g/dL    Hematocrit 21.9 (L) 35.0 - 47.0 %    MCV 91 78 - 100 fL    MCH 32.0 26.5 - 33.0 pg    MCHC 35.2 31.5 - 36.5 g/dL    RDW 21.8 (H) 10.0 - 15.0 %    Platelet Count 459 (H) 150 - 450 10e3/uL    % Neutrophils 62 %    % Lymphocytes 19 %    % Monocytes 10 %    % Eosinophils 6 %    % Basophils 3 %    % Immature Granulocytes 0 %    NRBCs per 100 WBC 2 (H) <1 /100    Absolute Neutrophils 7.7 1.6 - 8.3 10e3/uL    Absolute Lymphocytes 2.4 0.8 - 5.3 10e3/uL    Absolute Monocytes 1.2 0.0 - 1.3 10e3/uL    Absolute Eosinophils 0.7 0.0 - 0.7 10e3/uL    Absolute Basophils 0.3 (H) 0.0 - 0.2 10e3/uL    Absolute Immature Granulocytes 0.1 <=0.4 10e3/uL    Absolute NRBCs 0.2 10e3/uL   CBC with platelets differential     Status: Abnormal    Narrative    The following orders were created for panel  order CBC with platelets differential.  Procedure                               Abnormality         Status                     ---------                               -----------         ------                     CBC with platelets and d...[[id number]]  Abnormal            Final result                 Please view results for these tests on the individual orders.               Results for orders placed or performed during the hospital encounter of 11/24/23   Comprehensive metabolic panel     Status: Abnormal   Result Value Ref Range    Sodium 139 135 - 145 mmol/L    Potassium 4.0 3.4 - 5.3 mmol/L    Carbon Dioxide (CO2) 25 22 - 29 mmol/L    Anion Gap 8 7 - 15 mmol/L    Urea Nitrogen 8.2 6.0 - 20.0 mg/dL    Creatinine 0.52 0.51 - 0.95 mg/dL    GFR Estimate >90 >60 mL/min/1.73m2    Calcium 9.3 8.6 - 10.0 mg/dL    Chloride 106 98 - 107 mmol/L    Glucose 89 70 - 99 mg/dL    Alkaline Phosphatase 67 40 - 150 U/L    AST 50 (H) 0 - 45 U/L    ALT 30 0 - 50 U/L    Protein Total 7.2 6.4 - 8.3 g/dL    Albumin 4.6 3.5 - 5.2 g/dL    Bilirubin Total 3.4 (H) <=1.2 mg/dL   Reticulocyte count     Status: Abnormal   Result Value Ref Range    % Reticulocyte 17.3 (H) 0.5 - 2.0 %    Absolute Reticulocyte 0.417 (H) 0.025 - 0.095 10e6/uL   HCG quantitative pregnancy     Status: Normal   Result Value Ref Range    hCG Quantitative <1 <5 mIU/mL   CBC with platelets and differential     Status: Abnormal   Result Value Ref Range    WBC Count 12.3 (H) 4.0 - 11.0 10e3/uL    RBC Count 2.41 (L) 3.80 - 5.20 10e6/uL    Hemoglobin 7.7 (L) 11.7 - 15.7 g/dL    Hematocrit 21.9 (L) 35.0 - 47.0 %    MCV 91 78 - 100 fL    MCH 32.0 26.5 - 33.0 pg    MCHC 35.2 31.5 - 36.5 g/dL    RDW 21.8 (H) 10.0 - 15.0 %    Platelet Count 459 (H) 150 - 450 10e3/uL    % Neutrophils 62 %    % Lymphocytes 19 %    % Monocytes 10 %    % Eosinophils 6 %    % Basophils 3 %    % Immature Granulocytes 0 %    NRBCs per 100 WBC 2 (H) <1 /100    Absolute Neutrophils 7.7 1.6 - 8.3  10e3/uL    Absolute Lymphocytes 2.4 0.8 - 5.3 10e3/uL    Absolute Monocytes 1.2 0.0 - 1.3 10e3/uL    Absolute Eosinophils 0.7 0.0 - 0.7 10e3/uL    Absolute Basophils 0.3 (H) 0.0 - 0.2 10e3/uL    Absolute Immature Granulocytes 0.1 <=0.4 10e3/uL    Absolute NRBCs 0.2 10e3/uL   CBC with platelets differential     Status: Abnormal    Narrative    The following orders were created for panel order CBC with platelets differential.  Procedure                               Abnormality         Status                     ---------                               -----------         ------                     CBC with platelets and d...[[id number]]  Abnormal            Final result                 Please view results for these tests on the individual orders.     Medications   lactated ringers infusion (0 mLs Intravenous Stopped 11/24/23 1327)   heparin 100 unit/mL injection 5-10 mL (5 mLs Intracatheter $Given 11/24/23 1346)   HYDROmorphone (DILAUDID) injection 1 mg (1 mg Intravenous $Given 11/24/23 1227)   ketorolac (TORADOL) injection 30 mg (30 mg Intravenous $Given 11/24/23 0932)     Labs Ordered and Resulted from Time of ED Arrival to Time of ED Departure   COMPREHENSIVE METABOLIC PANEL - Abnormal       Result Value    Sodium 139      Potassium 4.0      Carbon Dioxide (CO2) 25      Anion Gap 8      Urea Nitrogen 8.2      Creatinine 0.52      GFR Estimate >90      Calcium 9.3      Chloride 106      Glucose 89      Alkaline Phosphatase 67      AST 50 (*)     ALT 30      Protein Total 7.2      Albumin 4.6      Bilirubin Total 3.4 (*)    RETICULOCYTE COUNT - Abnormal    % Reticulocyte 17.3 (*)     Absolute Reticulocyte 0.417 (*)    CBC WITH PLATELETS AND DIFFERENTIAL - Abnormal    WBC Count 12.3 (*)     RBC Count 2.41 (*)     Hemoglobin 7.7 (*)     Hematocrit 21.9 (*)     MCV 91      MCH 32.0      MCHC 35.2      RDW 21.8 (*)     Platelet Count 459 (*)     % Neutrophils 62      % Lymphocytes 19      % Monocytes 10      % Eosinophils  6      % Basophils 3      % Immature Granulocytes 0      NRBCs per 100 WBC 2 (*)     Absolute Neutrophils 7.7      Absolute Lymphocytes 2.4      Absolute Monocytes 1.2      Absolute Eosinophils 0.7      Absolute Basophils 0.3 (*)     Absolute Immature Granulocytes 0.1      Absolute NRBCs 0.2     HCG QUANTITATIVE PREGNANCY - Normal    hCG Quantitative <1       No orders to display      1:18 PM feeling markedly improved after above therapies.    Critical care was not performed.     Medical Decision Making  The patient's presentation was of moderate complexity (a chronic illness mild to moderate exacerbation, progression, or side effect of treatment).    The patient's evaluation involved:  review of external note(s) from 3+ sources (previous ED encounters, hematology encounters, care plan)  review of 3+ test result(s) ordered prior to this encounter (previous CBC, comprehensive panel, reticulocyte count)  ordering and/or review of 3+ test(s) in this encounter (see separate area of note for details)    The patient's management necessitated high risk (a parenteral controlled substance).    Assessment & Plan    23 year old female to the emergency department with exacerbation of sickle cell pain.  She has diffuse body pain consistent with previous exacerbations.  No infectious symptoms or other any other concerning new problems.  Patient has normal vital signs and a relatively unrevealing physical exam.  Her labs are at their baseline.  The patient received IV fluids as well as pain medications according to her care plan.  She felt markedly improved afterward and was requesting discharge.  She will be discharged home with plan for hematology follow-up per routine.  Return precautions provided.    I have reviewed the nursing notes. I have reviewed the findings, diagnosis, plan and need for follow up with the patient.    Discharge Medication List as of 11/24/2023  1:27 PM        START taking these medications    Details    oxyCODONE IR (ROXICODONE) 10 MG tablet Take 1 tablet (10 mg) by mouth every 4 hours as needed for severe pain or breakthrough pain Goal 4 per day. Max 6 per day., Disp-25 tablet, R-0, E-Prescribe             Final diagnoses:   Sickle cell pain crisis (H)     Chart documentation was completed with Dragon voice-recognition software. Even though reviewed, this chart may still contain some grammatical, spelling, and word errors.         [facility] EMERGENCY DEPARTMENT  11/24/2023     Francesco Green MD  11/24/23 1384

## 2023-11-27 ENCOUNTER — INFUSION THERAPY VISIT (OUTPATIENT)
Dept: INFUSION THERAPY | Facility: CLINIC | Age: 23
End: 2023-11-27
Attending: REGISTERED NURSE
Payer: COMMERCIAL

## 2023-11-27 ENCOUNTER — ONCOLOGY VISIT (OUTPATIENT)
Dept: ONCOLOGY | Facility: CLINIC | Age: 23
End: 2023-11-27
Attending: REGISTERED NURSE
Payer: COMMERCIAL

## 2023-11-27 ENCOUNTER — APPOINTMENT (OUTPATIENT)
Dept: LAB | Facility: CLINIC | Age: 23
End: 2023-11-27
Attending: REGISTERED NURSE
Payer: COMMERCIAL

## 2023-11-27 ENCOUNTER — NURSE TRIAGE (OUTPATIENT)
Dept: ONCOLOGY | Facility: CLINIC | Age: 23
End: 2023-11-27
Payer: COMMERCIAL

## 2023-11-27 ENCOUNTER — PATIENT OUTREACH (OUTPATIENT)
Dept: CARE COORDINATION | Facility: CLINIC | Age: 23
End: 2023-11-27
Payer: COMMERCIAL

## 2023-11-27 VITALS
RESPIRATION RATE: 18 BRPM | DIASTOLIC BLOOD PRESSURE: 78 MMHG | WEIGHT: 146.6 LBS | OXYGEN SATURATION: 98 % | SYSTOLIC BLOOD PRESSURE: 120 MMHG | HEART RATE: 93 BPM | BODY MASS INDEX: 25.16 KG/M2 | TEMPERATURE: 98.2 F

## 2023-11-27 VITALS — SYSTOLIC BLOOD PRESSURE: 117 MMHG | RESPIRATION RATE: 16 BRPM | DIASTOLIC BLOOD PRESSURE: 67 MMHG | HEART RATE: 101 BPM

## 2023-11-27 DIAGNOSIS — D57.00 SICKLE CELL PAIN CRISIS (H): Primary | ICD-10-CM

## 2023-11-27 DIAGNOSIS — D57.1 HB-SS DISEASE WITHOUT CRISIS (H): Primary | ICD-10-CM

## 2023-11-27 DIAGNOSIS — J45.909 ASTHMATIC BRONCHITIS WITHOUT COMPLICATION, UNSPECIFIED ASTHMA SEVERITY, UNSPECIFIED WHETHER PERSISTENT: ICD-10-CM

## 2023-11-27 DIAGNOSIS — D57.00 SICKLE CELL PAIN CRISIS (H): ICD-10-CM

## 2023-11-27 DIAGNOSIS — G81.10 SPASTIC HEMIPLEGIA, UNSPECIFIED ETIOLOGY, UNSPECIFIED LATERALITY (H): ICD-10-CM

## 2023-11-27 DIAGNOSIS — D57.1 HB-SS DISEASE WITHOUT CRISIS (H): ICD-10-CM

## 2023-11-27 DIAGNOSIS — E83.111 IRON OVERLOAD DUE TO REPEATED RED BLOOD CELL TRANSFUSIONS: ICD-10-CM

## 2023-11-27 DIAGNOSIS — J45.40 MODERATE PERSISTENT ASTHMA WITHOUT COMPLICATION: ICD-10-CM

## 2023-11-27 LAB
ALBUMIN SERPL BCG-MCNC: 4.5 G/DL (ref 3.5–5.2)
ALP SERPL-CCNC: 70 U/L (ref 40–150)
ALT SERPL W P-5'-P-CCNC: 23 U/L (ref 0–50)
ANION GAP SERPL CALCULATED.3IONS-SCNC: 12 MMOL/L (ref 7–15)
AST SERPL W P-5'-P-CCNC: 47 U/L (ref 0–45)
BASOPHILS # BLD AUTO: 0.3 10E3/UL (ref 0–0.2)
BASOPHILS NFR BLD AUTO: 2 %
BILIRUB SERPL-MCNC: 3.3 MG/DL
BUN SERPL-MCNC: 4.9 MG/DL (ref 6–20)
CALCIUM SERPL-MCNC: 9 MG/DL (ref 8.6–10)
CHLORIDE SERPL-SCNC: 106 MMOL/L (ref 98–107)
CREAT SERPL-MCNC: 0.42 MG/DL (ref 0.51–0.95)
DEPRECATED HCO3 PLAS-SCNC: 21 MMOL/L (ref 22–29)
EGFRCR SERPLBLD CKD-EPI 2021: >90 ML/MIN/1.73M2
EOSINOPHIL # BLD AUTO: 0.9 10E3/UL (ref 0–0.7)
EOSINOPHIL NFR BLD AUTO: 7 %
ERYTHROCYTE [DISTWIDTH] IN BLOOD BY AUTOMATED COUNT: 19.8 % (ref 10–15)
FERRITIN SERPL-MCNC: 6182 NG/ML (ref 6–175)
GLUCOSE SERPL-MCNC: 105 MG/DL (ref 70–99)
HCT VFR BLD AUTO: 19.2 % (ref 35–47)
HGB BLD-MCNC: 6.5 G/DL (ref 11.7–15.7)
IMM GRANULOCYTES # BLD: 0.1 10E3/UL
IMM GRANULOCYTES NFR BLD: 1 %
LYMPHOCYTES # BLD AUTO: 2.4 10E3/UL (ref 0.8–5.3)
LYMPHOCYTES NFR BLD AUTO: 19 %
MCH RBC QN AUTO: 31.1 PG (ref 26.5–33)
MCHC RBC AUTO-ENTMCNC: 33.9 G/DL (ref 31.5–36.5)
MCV RBC AUTO: 92 FL (ref 78–100)
MONOCYTES # BLD AUTO: 1 10E3/UL (ref 0–1.3)
MONOCYTES NFR BLD AUTO: 8 %
NEUTROPHILS # BLD AUTO: 8 10E3/UL (ref 1.6–8.3)
NEUTROPHILS NFR BLD AUTO: 63 %
NRBC # BLD AUTO: 0.1 10E3/UL
NRBC BLD AUTO-RTO: 1 /100
PLATELET # BLD AUTO: 425 10E3/UL (ref 150–450)
POTASSIUM SERPL-SCNC: 3.4 MMOL/L (ref 3.4–5.3)
PROT SERPL-MCNC: 7.5 G/DL (ref 6.4–8.3)
RBC # BLD AUTO: 2.09 10E6/UL (ref 3.8–5.2)
RETICS # AUTO: 0.41 10E6/UL (ref 0.03–0.1)
RETICS/RBC NFR AUTO: 19.6 % (ref 0.5–2)
SODIUM SERPL-SCNC: 139 MMOL/L (ref 135–145)
WBC # BLD AUTO: 12.6 10E3/UL (ref 4–11)

## 2023-11-27 PROCEDURE — 250N000011 HC RX IP 250 OP 636: Performed by: PEDIATRICS

## 2023-11-27 PROCEDURE — 85045 AUTOMATED RETICULOCYTE COUNT: CPT

## 2023-11-27 PROCEDURE — 96361 HYDRATE IV INFUSION ADD-ON: CPT

## 2023-11-27 PROCEDURE — 96374 THER/PROPH/DIAG INJ IV PUSH: CPT

## 2023-11-27 PROCEDURE — 82374 ASSAY BLOOD CARBON DIOXIDE: CPT | Performed by: REGISTERED NURSE

## 2023-11-27 PROCEDURE — 258N000003 HC RX IP 258 OP 636: Performed by: PEDIATRICS

## 2023-11-27 PROCEDURE — 96375 TX/PRO/DX INJ NEW DRUG ADDON: CPT

## 2023-11-27 PROCEDURE — 250N000011 HC RX IP 250 OP 636: Mod: JZ | Performed by: REGISTERED NURSE

## 2023-11-27 PROCEDURE — 36591 DRAW BLOOD OFF VENOUS DEVICE: CPT

## 2023-11-27 PROCEDURE — 99215 OFFICE O/P EST HI 40 MIN: CPT | Performed by: REGISTERED NURSE

## 2023-11-27 PROCEDURE — 36591 DRAW BLOOD OFF VENOUS DEVICE: CPT | Performed by: REGISTERED NURSE

## 2023-11-27 PROCEDURE — G0463 HOSPITAL OUTPT CLINIC VISIT: HCPCS | Performed by: REGISTERED NURSE

## 2023-11-27 PROCEDURE — 82728 ASSAY OF FERRITIN: CPT | Performed by: REGISTERED NURSE

## 2023-11-27 PROCEDURE — 250N000013 HC RX MED GY IP 250 OP 250 PS 637: Performed by: PEDIATRICS

## 2023-11-27 PROCEDURE — 96376 TX/PRO/DX INJ SAME DRUG ADON: CPT

## 2023-11-27 PROCEDURE — 85025 COMPLETE CBC W/AUTO DIFF WBC: CPT

## 2023-11-27 RX ORDER — ONDANSETRON 8 MG/1
8 TABLET, ORALLY DISINTEGRATING ORAL
Status: COMPLETED | OUTPATIENT
Start: 2023-11-27 | End: 2023-11-27

## 2023-11-27 RX ORDER — HEPARIN SODIUM,PORCINE 10 UNIT/ML
5 VIAL (ML) INTRAVENOUS
Status: CANCELLED | OUTPATIENT
Start: 2024-01-01

## 2023-11-27 RX ORDER — OXYCODONE HYDROCHLORIDE 10 MG/1
10 TABLET ORAL EVERY 4 HOURS PRN
Qty: 25 TABLET | Refills: 0 | Status: SHIPPED | OUTPATIENT
Start: 2023-12-01 | End: 2023-12-08

## 2023-11-27 RX ORDER — HEPARIN SODIUM (PORCINE) LOCK FLUSH IV SOLN 100 UNIT/ML 100 UNIT/ML
5 SOLUTION INTRAVENOUS
Status: CANCELLED | OUTPATIENT
Start: 2024-01-01

## 2023-11-27 RX ORDER — KETOROLAC TROMETHAMINE 30 MG/ML
30 INJECTION, SOLUTION INTRAMUSCULAR; INTRAVENOUS ONCE
Status: CANCELLED
Start: 2024-01-01 | End: 2024-01-01

## 2023-11-27 RX ORDER — HEPARIN SODIUM (PORCINE) LOCK FLUSH IV SOLN 100 UNIT/ML 100 UNIT/ML
5 SOLUTION INTRAVENOUS
Status: DISCONTINUED | OUTPATIENT
Start: 2023-11-27 | End: 2023-11-27 | Stop reason: HOSPADM

## 2023-11-27 RX ORDER — DIPHENHYDRAMINE HCL 25 MG
25 CAPSULE ORAL
Status: CANCELLED
Start: 2024-01-01

## 2023-11-27 RX ORDER — DIPHENHYDRAMINE HCL 25 MG
25 CAPSULE ORAL
Status: COMPLETED | OUTPATIENT
Start: 2023-11-27 | End: 2023-11-27

## 2023-11-27 RX ORDER — ALBUTEROL SULFATE 90 UG/1
2 AEROSOL, METERED RESPIRATORY (INHALATION) EVERY 6 HOURS PRN
Qty: 8.5 G | Refills: 3 | Status: SHIPPED | OUTPATIENT
Start: 2023-11-27

## 2023-11-27 RX ORDER — ALBUTEROL SULFATE 0.83 MG/ML
5 SOLUTION RESPIRATORY (INHALATION) EVERY 6 HOURS PRN
Qty: 90 ML | Refills: 3 | Status: SHIPPED | OUTPATIENT
Start: 2023-11-27

## 2023-11-27 RX ORDER — KETOROLAC TROMETHAMINE 30 MG/ML
30 INJECTION, SOLUTION INTRAMUSCULAR; INTRAVENOUS ONCE
Status: COMPLETED | OUTPATIENT
Start: 2023-11-27 | End: 2023-11-27

## 2023-11-27 RX ORDER — ONDANSETRON 4 MG/1
8 TABLET, FILM COATED ORAL
Status: CANCELLED
Start: 2024-01-01

## 2023-11-27 RX ORDER — HEPARIN SODIUM (PORCINE) LOCK FLUSH IV SOLN 100 UNIT/ML 100 UNIT/ML
5 SOLUTION INTRAVENOUS ONCE
Status: COMPLETED | OUTPATIENT
Start: 2023-11-27 | End: 2023-11-27

## 2023-11-27 RX ADMIN — HYDROMORPHONE HYDROCHLORIDE 1 MG: 1 INJECTION, SOLUTION INTRAMUSCULAR; INTRAVENOUS; SUBCUTANEOUS at 11:21

## 2023-11-27 RX ADMIN — DIPHENHYDRAMINE HYDROCHLORIDE 25 MG: 25 CAPSULE ORAL at 11:20

## 2023-11-27 RX ADMIN — ONDANSETRON 8 MG: 8 TABLET, ORALLY DISINTEGRATING ORAL at 11:19

## 2023-11-27 RX ADMIN — Medication 5 ML: at 10:18

## 2023-11-27 RX ADMIN — SODIUM CHLORIDE, POTASSIUM CHLORIDE, SODIUM LACTATE AND CALCIUM CHLORIDE 1000 ML: 600; 310; 30; 20 INJECTION, SOLUTION INTRAVENOUS at 11:18

## 2023-11-27 RX ADMIN — HYDROMORPHONE HYDROCHLORIDE 1 MG: 1 INJECTION, SOLUTION INTRAMUSCULAR; INTRAVENOUS; SUBCUTANEOUS at 12:19

## 2023-11-27 RX ADMIN — HYDROMORPHONE HYDROCHLORIDE 1 MG: 1 INJECTION, SOLUTION INTRAMUSCULAR; INTRAVENOUS; SUBCUTANEOUS at 13:19

## 2023-11-27 RX ADMIN — Medication 5 ML: at 13:42

## 2023-11-27 RX ADMIN — KETOROLAC TROMETHAMINE 30 MG: 30 INJECTION, SOLUTION INTRAMUSCULAR; INTRAVENOUS at 11:24

## 2023-11-27 ASSESSMENT — PAIN SCALES - GENERAL: PAINLEVEL: WORST PAIN (10)

## 2023-11-27 NOTE — TELEPHONE ENCOUNTER
Oncology Nurse Triage - Sickle Cell Pain Crisis:  Situation: Jennifer  calling about Sickle Cell Pain Crisis, requesting to be added on for IV fluids and pain medicine    Background:   Patient's last infusion was 11/24 in ED  Last clinic visit date:11/3/23 with Patricia Mantilla CNP  Next clinic visit date: today, 11/27/23 with Patricia Mantilla CNP  Does patient have active treatment plan?  Yes    Assessment of Symptoms:  Onset/Duration of symptoms: 1 day    Is it typical sickle cell pain? Yes   Location: generalized  Character: Sharp           Intensity: 9/10    Any radiation of pain, numbness, tingling, weakness, warmth, swelling, discoloration of arms or legs?No     Fever?No  Chest Pain Present: No   Shortness of breath: Yes Shortness of breath, ongoing    Other home therapies tried: HEAT/HEATING PAD and WARM BATH   Last home medication taken and when: 0600 all meds taken and oxycodone    Any Refills Needed?: no  Does patient have transportation & length of time to get to clinic: Yes   Has transportation to clinic for apt w/provider but will need a ride home from apt if has IVF/pm    Recommendations:   If you do not hear from the infusion center by 2pm then you will not be able to get in for an infusion today. If symptoms worsen while waiting for call back, and/or you experience fever, chills, SOB, chest pain, cough, n/v, dizziness, numbness, swelling, discoloration of extremities, then seek emergency evaluation in Emergency Department.     Added to infusion wait list.    Sent message via secure chat to Patricia Mantilla to ask if okay for IVF/pm since pt had ED visit w/in past 3D. Patricia approved infusion.    [facility] can offer apt a 1130  Called Jennifer who confirmed she can attend apt at 1130.  Updated Infusion Charge Nurse, provider.  Message sent to [facility] to schedule.  Message sent to  to assist with arranging transportation for ride home from infusion.

## 2023-11-27 NOTE — PROGRESS NOTES
Social Work Note  [facility]    Patient had infusion added on after their provider visit and SW requested to assist with ride home. Patient set up ride with insurance as appointment with provider was made last week. Patient still has their will call ride home and does not need new ride. SW called patient to confirm and they do have a ride home through their insurance. Ride not needed as already scheduled on patients end. SW will continue to remain available as needed.    CARLOS Chavez,LGSW  Hematology/Oncology Social Worker  Phone:[phone] Pager: [phone]

## 2023-11-27 NOTE — PROGRESS NOTES
Chief Complaint   Patient presents with    Infusion     IV fluids, pain meds     Infusion Nursing Note:  Jennifer Cervantes presents today for IV fluids, pain meds- PRN.    Patient seen by provider today: Yes, Patricia Mantilla CNP   present during visit today: Not Applicable.    Note:   Confirmed has ride home.  Pain is sharp 9/10 upon arrival, generalized all over. 4/10 at time of discharge.  1 L LR infused over 2.25.      Intravenous Access:  Implanted Port.  Labs drawn post fluids.     Treatment Conditions:  Approved per team for treatment of sickle cell pain.       Post Infusion Assessment:  Patient tolerated infusion without incident.  Patient observed for 30 minutes post last dose of dilaudid.  Blood return noted pre and post infusion.  Site patent and intact, free from redness, edema or discomfort.  No evidence of extravasations.  Access discontinued per protocol.       Discharge Plan:   AVS to patient via MYCHART.  Patient will follow up with ordering provider.   Patient discharged in stable condition accompanied by: self.  Departure Mode: Ambulatory.       Administrations This Visit       diphenhydrAMINE (BENADRYL) capsule 25 mg       Admin Date  11/27/2023 Action  $Given Dose  25 mg Route  Oral Documented By  Tricia Adame, JOE              heparin 100 unit/mL injection 5 mL       Admin Date  11/27/2023 Action  $Given Dose  5 mL Route  Intracatheter Documented By  Roz Kaur, JOE              HYDROmorphone (DILAUDID) injection 1 mg       Admin Date  11/27/2023 Action  $Given Dose  1 mg Route  Intravenous Documented By  Tricia Adame, JOE               Admin Date  11/27/2023 Action  $Given Dose  1 mg Route  Intravenous Documented By  Tricia Adame RN               Admin Date  11/27/2023 Action  $Given Dose  1 mg Route  Intravenous Documented By  Tricia Adame, JOE              ketorolac (TORADOL) injection 30 mg       Admin Date  11/27/2023 Action  $Given Dose  30 mg Route  Intravenous Documented  "By  Tricia Adame RN              lactated ringers BOLUS 1,000 mL       Admin Date  11/27/2023 Action  $New Bag Dose  1,000 mL Rate  500 mL/hr Route  Intravenous Documented By  Tricia Adame RN              ondansetron (ZOFRAN ODT) ODT tab 8 mg       Admin Date  11/27/2023 Action  $Given Dose  8 mg Route  Oral Documented By  Tricia Adame RN                    Vital signs:      BP: 117/67 Pulse: 101   Resp: 16            Estimated body mass index is 25.16 kg/m  as calculated from the following:    Height as of 11/24/23: 1.626 m (5' 4\").    Weight as of an earlier encounter on 11/27/23: 66.5 kg (146 lb 9.6 oz).                  "

## 2023-11-27 NOTE — PROGRESS NOTES
Critical Hemoglobin of 6.5 reported to Patricia Mantilla. Provider recommended giving patient the option to get a blood transfusion in the near future. Pt agreed to the transfusion. Oncology team will follow-up with getting transfusion coordinated and scheduled.

## 2023-11-27 NOTE — NURSING NOTE
"Oncology Rooming Note    November 27, 2023 10:24 AM   Jennifer Crevantes is a 23 year old female who presents for:    Chief Complaint   Patient presents with    Port Draw     Labs drawn via port by RN. Port accessed with 20g 3/4\" power needle, vitals taken. Flushed with saline and heparin. Pt tolerated well. Patient checked into next appointment.      Oncology Clinic Visit     Sickle Cell anemia      Initial Vitals: /78 (BP Location: Right arm, Patient Position: Sitting, Cuff Size: Adult Regular)   Pulse 93   Temp 98.2  F (36.8  C) (Oral)   Resp 18   Wt 66.5 kg (146 lb 9.6 oz)   SpO2 98%   BMI 25.16 kg/m   Estimated body mass index is 25.16 kg/m  as calculated from the following:    Height as of 11/24/23: 1.626 m (5' 4\").    Weight as of this encounter: 66.5 kg (146 lb 9.6 oz). Body surface area is 1.73 meters squared.  Worst Pain (10) Comment: Data Unavailable   No LMP recorded. Patient has had an implant.  Allergies reviewed: Yes  Medications reviewed: Yes    Medications: MEDICATION REFILLS NEEDED TODAY. Provider was notified.  Pharmacy name entered into Handshake: [facility] [place], [place] - [phone]    Clinical concerns: needs hydroxyurea, aspirin, Carl Robertson              "

## 2023-11-27 NOTE — NURSING NOTE
"Chief Complaint   Patient presents with    Port Draw     Labs drawn via port by RN. Port accessed with 20g 3/4\" power needle, vitals taken. Flushed with saline and heparin. Pt tolerated well. Patient checked into next appointment.       Dee Dee Peters RN    "

## 2023-11-27 NOTE — PROGRESS NOTES
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 She had multiple thromboembolic events in 2021 despite adherent anticoagulation use (though warfarin was perpetually low) and there are concerns for chronic thromboembolic disease but did not have pulmonary HTN on a November 2021 cath. She is maintained on chronic PO opioids and twice-weekly infusion visits (since 1/24/22) but has been able to be maintained on this regimen and has stayed out of the ED most of the time with even rarer admissions.     Interval History:  Jennifer is seen for an interim visit due to recent increase in sickle cell pain. So far in November she has had four ED visits due to increased pain. Her primary issue the past few weeks has been shortness of breath. She's had difficulty standing for long periods of time without feeling short of breath. She has been using her albuterol inhaler and nebulizer frequently and has currently run out of both medications. She occasionally wakes up from sleep coughing. She can hear audible wheezes at times. She does acknowledge that there are days when she only needs to use her nebulizer minimally. With the persistent breathing difficulties, she has had significant pain in her chest. She has a dry cough which induces pain bilaterally. She has been out of work most of the past month due to pain and need for both infusion center and ED visits. She still wishes to be able to work and make money but is frustrated because her hours were  "recently cut drastically. She is only scheduled for two 2-hour shifts this week. Apparently full-time employees are given priority with hours. Because of this she is working on looking for a new job, preferably a desk job that is less demanding. Her recent increased pain and respiratory symptoms began after her last blood transfusion. Initially she felt better following the transfusion but in the days following began experiencing the pain in her chest. A recent VQ scan was performed 11/21 which was negative for PE.    Sickle Cell Disease Comprehensive Checklist  Bone Health/Avascular Necrosis Screening/Symptoms (each visit): no new concerns today  Leg Ulcer evaluation (every visit): none  Hypertension (every visit):stable 11/3/23  Last pulmonary evaluation (asthma, AMAN, pulm HTN): 9/28/22, due for follow-up  Stroke/silent cerebral infarct Hx (Y/N): Yes TIA ~2014, first event ~age 1 with full stroke and R sided weakness  Last PCP Visit: 3/6/23  Vaccines:  PCV13: 5/13/19  Pneumovax (PPSV23): 3/04, 10/09, 7/12/19 (next due 7/2024)  Menactra: 4/2010, 9/2015 (MCV done 8/16/21)  MenB: 9/16/15, 5/13/19  Influenza: 10/2/23  Audiology (chelation): done 6/2020, normal.. However, on 6/7, \"While there is no significant change in grade on the CTCAE 4.03 Scale, there were hearing changes bilaterally for both standard and extended high frequency audiometry.  Will need to determine if an ENT consult is advised due to the asymmetry in extended high frequency testing.\"     Plan last reviewed with patient: 10/2/23    Patient background: 22 yo F, enjoys movies and kids though there are times where she does not really want to talk to people. Does not have a lot of social support at home.     Sickle Cell Disease History  Primary Hematologist Team: Jose Rafael Duncan  PCP: none  Genotype: SS  Acute Pain Crisis Treatment: (limiting IV   ER   Dilaudid 1 mg IV q1h up to 3 doses  Toradol 30 mg IV x1   Maintenance IV fluids with LR  Other: Zofran " 8 mg IV PRN nausea  Inpatient:  PCA Dilaudid 1 hr q30 minutes, no basal rate  Toradol 30 mg x6h x 4 hr  LR maintenance x 1-2 days  Other Medications: Zofran  ASA  Supportive Care: Docusate Senna  Chronic Pain Medications:    Home regimen: oxycodone 15 mg p.o. q.4-6 hours p.r.n. breakthrough pain.  She also continues on Voltaren gel, and Zoloft among other medications.    -Also benefits from mental health visits, acupuncture  Baseline Hemoglobin: 7 g/dl without chronic transfusions  Hydroxyurea use: Yes  H/O blood transfusions: Yes, several (iron overload) Most recent 11/20/2021  H/O Transfusion Reactions: no  Antibodies:none  H/O Acute Chest Syndrome: Yes  Last episode:9/05/22 (previously 4/26/21, 10/2019)   ICU/intubation: not with 9/2022 admission  H/O Stroke: Yes (managed with chronic transfusions in the past, stopped late Spring 2020)  H/O VTE: Yes (2/2021)  H/O Cholecystectomy or Splenectomy: no  H/O Asthma, Pulm HTN, AVN, Leg Ulcers, Nephropathy, Retinopathy, etc: Iron overload, asthma, chronic lung disease, physical limitations from early stroke    ---------------------------------------  Jennifer Cervantes's Goals (discussed today, 11/27/23)    1-3 month goal:  Look for a new job    6 month goal:  Save money for ultimate goal of moving out    12 month goal:  Move into her own place     Disease-specific goal(s):  Continue to wean oxycodone down, next with reduced doses. Minimize infusion center visits.  ---------------------------------------      Current Outpatient Medications   Medication Sig Dispense Refill    acetaminophen (TYLENOL) 325 MG tablet Take 2 tablets (650 mg) by mouth every 6 hours as needed for mild pain 120 tablet 3    albuterol (PROAIR HFA/PROVENTIL HFA/VENTOLIN HFA) 108 (90 Base) MCG/ACT inhaler Inhale 2 puffs into the lungs every 6 hours as needed for shortness of breath or wheezing 8.5 g 3    albuterol (PROVENTIL) (2.5 MG/3ML) 0.083% neb solution Take 2 vials (5 mg) by nebulization every 6  hours as needed for shortness of breath or wheezing 90 mL 3    aspirin (ASA) 81 MG chewable tablet Take 1 tablet (81 mg) by mouth 2 times daily 60 tablet 11    budesonide-formoterol (SYMBICORT) 160-4.5 MCG/ACT Inhaler Inhale 2 puffs into the lungs 2 times daily 10.2 g 3    cetirizine (ZYRTEC) 10 MG tablet Take 1 tablet (10 mg) by mouth daily 30 tablet 1    deferasirox (JADENU) 360 MG tablet Take 4 tablets (1,440 mg) by mouth every evening 120 tablet 4    diphenhydrAMINE (BENADRYL) 25 MG capsule Take 1 capsule (25 mg) by mouth every 6 hours as needed for itching or allergies 30 capsule 0    Hydroxyurea 1000 MG TABS Take 3,000 mg by mouth daily 90 tablet 3    methocarbamol (ROBAXIN) 750 MG tablet Take 1 tablet (750 mg) by mouth 4 times daily as needed for muscle spasms (during sickle pain crises. Okay to take scheduled while in pain) 60 tablet 1    ondansetron (ZOFRAN) 8 MG tablet Take 1 tablet (8 mg) by mouth every 8 hours as needed 30 tablet 1    [START ON 12/1/2023] oxyCODONE IR (ROXICODONE) 10 MG tablet Take 1 tablet (10 mg) by mouth every 4 hours as needed for severe pain or breakthrough pain Goal 4 per day. Max 6 per day. 25 tablet 0    EPINEPHrine (ANY BX GENERIC EQUIV) 0.3 MG/0.3ML injection 2-pack Inject 0.3 mLs (0.3 mg) into the muscle as needed for anaphylaxis (Patient not taking: Reported on 11/9/2023) 1 each 1    naloxone (NARCAN) 4 MG/0.1ML nasal spray Spray 4 mg into one nostril alternating nostrils as needed for opioid reversal every 2-3 minutes until assistance arrives (Patient not taking: Reported on 11/9/2023)         Past Medical History  Past Medical History:   Diagnosis Date    Anxiety     Bleeding disorder (H24)     Blood clotting disorder (H24)     Cerebral infarction (H) 2015    Cognitive developmental delay     low IQ. 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 side (H)      right hand contractures    Iron overload due to repeated red blood cell transfusions     Migraines     Multiple subsegmental pulmonary emboli without acute cor pulmonale (H) 02/01/2021    Oppositional defiant behavior     Presence of intrauterine contraceptive device 2/18/2020    Superficial venous thrombosis of arm, right 03/25/2021    Uncomplicated asthma      Past Surgical History:   Procedure Laterality Date    AS INSERT TUNNELED CV 2 CATH W/O PORT/PUMP      CHOLECYSTECTOMY      CV RIGHT HEART CATH MEASUREMENTS RECORDED N/A 11/18/2021    Procedure: Right Heart Cath;  Surgeon: Jackson Stauffer MD;  Location:  [facility]ATH [facility]    INSERT PORT VASCULAR ACCESS Left 4/21/2021    Procedure: INSERTION, VASCULAR ACCESS PORT (NOT SURE ON SIDE UNTIL REMOVAL);  Surgeon: Rajan More MD;  Location: [facility] OR    IR CHEST PORT PLACEMENT > 5 YRS OF AGE  4/21/2021    IR CVC NON TUNNEL LINE REMOVAL  5/6/2021    IR CVC NON TUNNEL PLACEMENT > 5 YRS  04/07/2020    IR CVC NON TUNNEL PLACEMENT > 5 YRS  4/30/2021    IR CVC NON TUNNEL PLACEMENT > 5 YRS  9/7/2022    IR PORT REMOVAL LEFT  4/21/2021    REMOVE PORT VASCULAR ACCESS Left 4/21/2021    Procedure: REMOVAL, VASCULAR ACCESS PORT LEFT;  Surgeon: Rajan More MD;  Location: [facility] OR    REPAIR TENDON ELBOW Right 10/02/2019    Procedure: Right Elbow Flexor Lengthening, Flexor Pronator Slide Of Wrist and Finger, Thumb Adductor Lengthening;  Surgeon: Anai Franco MD;  Location: [facility] OR    TONSILLECTOMY Bilateral 10/02/2019    Procedure: Bilateral Tonsillectomy;  Surgeon: Farhana Guy MD;  Location: [facility] OR    ZZC BREAST REDUCTION (INCLUDES LIPO) TIER 3 Bilateral 04/23/2019     Allergies   Allergen Reactions    Contrast Dye      Hives and breathing issues    Fish-Derived Products Hives    Seafood Hives    Adhesive Tape Hives     Primipore dressing causes hives    Gadolinium     Iodinated Contrast Media      Social History   Social History     Tobacco  Use    Smoking status: Never     Passive exposure: Never    Smokeless tobacco: Never   Substance Use Topics    Alcohol use: Not Currently     Alcohol/week: 0.0 standard drinks of alcohol    Drug use: Never    Still living at home with mom and extended family.   Past medical history and social history were reviewed.    Physical Examination:  /78 (BP Location: Right arm, Patient Position: Sitting, Cuff Size: Adult Regular)   Pulse 93   Temp 98.2  F (36.8  C) (Oral)   Resp 18   Wt 66.5 kg (146 lb 9.6 oz)   SpO2 98%   BMI 25.16 kg/m       Wt Readings from Last 10 Encounters:   11/27/23 66.5 kg (146 lb 9.6 oz)   11/24/23 66.1 kg (145 lb 12.8 oz)   11/11/23 66 kg (145 lb 8 oz)   11/01/23 65.9 kg (145 lb 4.8 oz)   10/27/23 68 kg (150 lb)   10/13/23 70.3 kg (155 lb)   10/12/23 66.9 kg (147 lb 6.4 oz)   10/02/23 66.6 kg (146 lb 12.8 oz)   09/22/23 68 kg (150 lb)   09/08/23 65.4 kg (144 lb 3.2 oz)     General: Pleasant female, NAD  Eyes: EOMI, PERRL. Mild. scleral icterus.  Respiratory: CTA bilaterally, diminished in bases. No wheezing.  Cardiac: RRR, no m/g/r  Ext: No peripheral edema.  MSK: Contractured right arm and hand 2/2 stoke.  Neurologic: Grossly nonfocal. A/O x 4.  Skin: No rashes, petechiae, or bruising noted on exposed skin. Port accessed in left chest    Laboratory Data:  Recent Results (from the past 240 hour(s))   Extra Blue Top Tube    Collection Time: 11/17/23  7:09 PM   Result Value Ref Range    Hold Specimen JIC    Extra Red Top Tube    Collection Time: 11/17/23  7:09 PM   Result Value Ref Range    Hold Specimen JIC    Extra Green Top (Lithium Heparin) Tube    Collection Time: 11/17/23  7:09 PM   Result Value Ref Range    Hold Specimen JIC    Extra Green Top (Lithium Heparin) Tube    Collection Time: 11/17/23  7:09 PM   Result Value Ref Range    Hold Specimen JIC    Extra Purple Top Tube    Collection Time: 11/17/23  7:09 PM   Result Value Ref Range    Hold Specimen     Extra Purple Top Tube     Collection Time: 11/17/23  7:09 PM   Result Value Ref Range    Hold Specimen [facility]    Comprehensive metabolic panel    Collection Time: 11/17/23  7:09 PM   Result Value Ref Range    Sodium 139 135 - 145 mmol/L    Potassium 4.2 3.4 - 5.3 mmol/L    Carbon Dioxide (CO2) 24 22 - 29 mmol/L    Anion Gap 10 7 - 15 mmol/L    Urea Nitrogen 13.2 6.0 - 20.0 mg/dL    Creatinine 0.50 (L) 0.51 - 0.95 mg/dL    GFR Estimate >90 >60 mL/min/1.73m2    Calcium 9.1 8.6 - 10.0 mg/dL    Chloride 105 98 - 107 mmol/L    Glucose 109 (H) 70 - 99 mg/dL    Alkaline Phosphatase 72 40 - 150 U/L    AST 45 0 - 45 U/L    ALT 27 0 - 50 U/L    Protein Total 7.7 6.4 - 8.3 g/dL    Albumin 4.7 3.5 - 5.2 g/dL    Bilirubin Total 4.1 (H) <=1.2 mg/dL   Reticulocyte count    Collection Time: 11/17/23  7:09 PM   Result Value Ref Range    % Reticulocyte 21.1 (H) 0.5 - 2.0 %    Absolute Reticulocyte 0.670 (H) 0.025 - 0.095 10e6/uL   CBC with platelets and differential    Collection Time: 11/17/23  7:09 PM   Result Value Ref Range    WBC Count 12.0 (H) 4.0 - 11.0 10e3/uL    RBC Count 3.11 (L) 3.80 - 5.20 10e6/uL    Hemoglobin 10.4 (L) 11.7 - 15.7 g/dL    Hematocrit 29.1 (L) 35.0 - 47.0 %    MCV 94 78 - 100 fL    MCH 33.4 (H) 26.5 - 33.0 pg    MCHC 35.7 31.5 - 36.5 g/dL    RDW 23.0 (H) 10.0 - 15.0 %    Platelet Count 458 (H) 150 - 450 10e3/uL    % Neutrophils 73 %    % Lymphocytes 15 %    % Monocytes 7 %    % Eosinophils 4 %    % Basophils 1 %    % Immature Granulocytes 0 %    NRBCs per 100 WBC 4 (H) <1 /100    Absolute Neutrophils 8.8 (H) 1.6 - 8.3 10e3/uL    Absolute Lymphocytes 1.8 0.8 - 5.3 10e3/uL    Absolute Monocytes 0.9 0.0 - 1.3 10e3/uL    Absolute Eosinophils 0.5 0.0 - 0.7 10e3/uL    Absolute Basophils 0.1 0.0 - 0.2 10e3/uL    Absolute Immature Granulocytes 0.1 <=0.4 10e3/uL    Absolute NRBCs 0.4 10e3/uL   Comprehensive metabolic panel    Collection Time: 11/24/23  9:42 AM   Result Value Ref Range    Sodium 139 135 - 145 mmol/L    Potassium 4.0 3.4 - 5.3  mmol/L    Carbon Dioxide (CO2) 25 22 - 29 mmol/L    Anion Gap 8 7 - 15 mmol/L    Urea Nitrogen 8.2 6.0 - 20.0 mg/dL    Creatinine 0.52 0.51 - 0.95 mg/dL    GFR Estimate >90 >60 mL/min/1.73m2    Calcium 9.3 8.6 - 10.0 mg/dL    Chloride 106 98 - 107 mmol/L    Glucose 89 70 - 99 mg/dL    Alkaline Phosphatase 67 40 - 150 U/L    AST 50 (H) 0 - 45 U/L    ALT 30 0 - 50 U/L    Protein Total 7.2 6.4 - 8.3 g/dL    Albumin 4.6 3.5 - 5.2 g/dL    Bilirubin Total 3.4 (H) <=1.2 mg/dL   Reticulocyte count    Collection Time: 11/24/23  9:42 AM   Result Value Ref Range    % Reticulocyte 17.3 (H) 0.5 - 2.0 %    Absolute Reticulocyte 0.417 (H) 0.025 - 0.095 10e6/uL   HCG quantitative pregnancy    Collection Time: 11/24/23  9:42 AM   Result Value Ref Range    hCG Quantitative <1 <5 mIU/mL   CBC with platelets and differential    Collection Time: 11/24/23  9:42 AM   Result Value Ref Range    WBC Count 12.3 (H) 4.0 - 11.0 10e3/uL    RBC Count 2.41 (L) 3.80 - 5.20 10e6/uL    Hemoglobin 7.7 (L) 11.7 - 15.7 g/dL    Hematocrit 21.9 (L) 35.0 - 47.0 %    MCV 91 78 - 100 fL    MCH 32.0 26.5 - 33.0 pg    MCHC 35.2 31.5 - 36.5 g/dL    RDW 21.8 (H) 10.0 - 15.0 %    Platelet Count 459 (H) 150 - 450 10e3/uL    % Neutrophils 62 %    % Lymphocytes 19 %    % Monocytes 10 %    % Eosinophils 6 %    % Basophils 3 %    % Immature Granulocytes 0 %    NRBCs per 100 WBC 2 (H) <1 /100    Absolute Neutrophils 7.7 1.6 - 8.3 10e3/uL    Absolute Lymphocytes 2.4 0.8 - 5.3 10e3/uL    Absolute Monocytes 1.2 0.0 - 1.3 10e3/uL    Absolute Eosinophils 0.7 0.0 - 0.7 10e3/uL    Absolute Basophils 0.3 (H) 0.0 - 0.2 10e3/uL    Absolute Immature Granulocytes 0.1 <=0.4 10e3/uL    Absolute NRBCs 0.2 10e3/uL   Ferritin    Collection Time: 11/27/23 10:17 AM   Result Value Ref Range    Ferritin 6,182 (H) 6 - 175 ng/mL   Comprehensive metabolic panel    Collection Time: 11/27/23 10:17 AM   Result Value Ref Range    Sodium 139 135 - 145 mmol/L    Potassium 3.4 3.4 - 5.3 mmol/L     Carbon Dioxide (CO2) 21 (L) 22 - 29 mmol/L    Anion Gap 12 7 - 15 mmol/L    Urea Nitrogen 4.9 (L) 6.0 - 20.0 mg/dL    Creatinine 0.42 (L) 0.51 - 0.95 mg/dL    GFR Estimate >90 >60 mL/min/1.73m2    Calcium 9.0 8.6 - 10.0 mg/dL    Chloride 106 98 - 107 mmol/L    Glucose 105 (H) 70 - 99 mg/dL    Alkaline Phosphatase 70 40 - 150 U/L    AST 47 (H) 0 - 45 U/L    ALT 23 0 - 50 U/L    Protein Total 7.5 6.4 - 8.3 g/dL    Albumin 4.5 3.5 - 5.2 g/dL    Bilirubin Total 3.3 (H) <=1.2 mg/dL       Assessment and Plan:  1. Sickle Cell HgbSS Disease  2. Acute pain crises  3. Chronic Pain  4. 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 History of CVA  7. Hearing loss    Jennifer is seen today for interim follow-up due to recent increase in sickle cell pain and dyspnea. She now feels as if she is experiencing an exacerbation of asthma, evidenced by activity intolerance and increased shortness of breath with activity. Her oxygen saturation in clinic today is reassuring at 98%. She did confirm she took an albuterol nebulizar prior to coming to clinic. Her primarily concern is  getting her sickle cell pain better controlled which she is trying to achieve with routine use of home medications, heat, infusion center visits and limited ED visits. The drastic change in weather has historically been a trigger for her. I am concerned that her physical exertion at work has also contributed to her recent symptoms. We also discussed the possibility of viral illness exacerbating her symptoms although she is not currently exhibiting any symptoms concerning for infection.    She will receive IV fluids and pain medication in clinic today. We can proceed with sidney on Friday 12/1 as scheduled. I anticipate she will need or request an additional infusion for fluid and pain medication which is appropriate. She is aware to call to formally request this accommodation. She has now had at least two significant  exacerbations of asthma symptoms this year. Her last pulmonology visit was September 2022. Will request pulm follow-up for close monitoring.     She continues to work toward tapering oxycodone although is now receiving 25 tablets of oxycodone (15 mg) weekly which is an increase from 10 tablets weekly. She believes she was prescribed an increased quantity in an effort to effectively manage her pain crises outpatient with the hope to avoid ED visits.    Our shared, ongoing goal is to get back on track with her oxycodone taper. She most recently was agreeable to receiving a decreased dose (10 mg) with qty 25 tablets weekly. We will continue with her current prescription due her recent increase in ED utilization and revisit potential taper week by week. A prescription was queued for refill this Friday, 12/1/23.    Her ED pain pain has been adjusted to now be able to receive IV dilaudid and take a break from ketamine (updated 10/2/23. She feels that sidney infusions are helpful in reducing crises. We will continue monthly infusions.    Desferal is currently on hold due to desire to stop/hold infusion due to decreased quality of life related to the amount of time she needed to be connected for infusion. Remains on Jadenu. Her Ferriscan in September 2023 did show improvement from last year. We do need to make sure she gets back into audiology and ophthalmology annually due to chelator. Last eye exam was recently completed on 10/25/23. Audiology visit scheduled 2/7/24.      Plan:  -Pulmonology follow-up requested  -Check CBC and retic today  -Continue Hydrea to 3000mg daily to help lessen frequency of sickle cell pain.   -Continue monthly crizanlizumab infusions.  -Continue slow taper of oxycodone. Currently receiving oxycodone 10 mg every 4 hours PRN, qty 25. Recommend tapering to qty 20 with 12/8 refill if Jennifer is agreeable  -She can self-reduce infusion days/week and we will continue to keep the cap at 2/week for  now.  -Continue infusion center visits limited to two times per week (Mondays and Fridays ideally although still needs to call to request). Continue diligent home management with current medications, heat, rest, compression, warm baths. Methocarbamol was recently added which Jennifer is utilizing and finds helpful.  -If unable to manage at home can go to ED  with continued plan to use IV Dilaudid and take a break from ketamine (10/2/23). No PCA use and goal for any admission would still be to discharge by 5 days or less  - Desferal infusions on hold. Continue Jadenu. Repeat Ferriscan in 4-6 months (January-March 2024  -Continue aspirin BID  -RTC with me in 1 months, coordinate with sidney infusions.     52 minutes spent on the date of the encounter doing chart review, review of test results, interpretation of tests, patient visit, and documentation     Patricia Mantilla, CNP    Addendum:   Hgb today low at 6.5. Offered to proceed with 1 unit PRBC transfusion as she has been increasingly symptomatic or monitor with repeat CBC in 2 days. Jennifer would prefer to proceed with transfusion.

## 2023-11-27 NOTE — Clinical Note
11/27/2023         RE: Jennifer Cervantes  [address]  [facility] [address]        Dear Colleague,    Thank you for referring your patient, Jennifer Cervantes, to the [facility]. Please see a copy of my visit note below.    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 She had multiple thromboembolic events in 2021 despite adherent anticoagulation use (though warfarin was perpetually low) and there are concerns for chronic thromboembolic disease but did not have pulmonary HTN on a November 2021 cath. She is maintained on chronic PO opioids and twice-weekly infusion visits (since 1/24/22) but has been able to be maintained on this regimen and has stayed out of the ED most of the time with even rarer admissions.     Interval History:  Negative VQ scan 11/21  ED 11/1, 11/11, 11/17, 11/24  Hgb 7.7 11/24  RVP? COVID?    Can't stand for long periods of time  Out of breath if standing for too long  Nebulizers more frequently than every 2 hours  Out of neb and rescue inhaler  Wake up from sleep coughing  Out of work most days the past month  Only scheduled for 2 hour shifts, 2 days this week  Priority given to full time employees   Pain and respiratory symptoms increased after blood transfusion    Sickle Cell Disease Comprehensive Checklist  Bone Health/Avascular Necrosis Screening/Symptoms (each visit): no new concerns today  Leg Ulcer evaluation (every visit): none  Hypertension (every visit):stable 11/3/23  Last  "pulmonary evaluation (asthma, AMAN, pulm HTN): 9/28/22  Stroke/silent cerebral infarct Hx (Y/N): Yes TIA ~2014, first event ~age 1 with full stroke and R sided weakness  Last PCP Visit: 3/6/23  Vaccines:  PCV13: 5/13/19  Pneumovax (PPSV23): 3/04, 10/09, 7/12/19 (next due 7/2024)  Menactra: 4/2010, 9/2015 (MCV done 8/16/21)  MenB: 9/16/15, 5/13/19  Influenza: 10/2/23  Audiology (chelation): done 6/2020, normal.. However, on 6/7, \"While there is no significant change in grade on the CTCAE 4.03 Scale, there were hearing changes bilaterally for both standard and extended high frequency audiometry.  Will need to determine if an ENT consult is advised due to the asymmetry in extended high frequency testing.\"     Plan last reviewed with patient: 10/2/23    Patient background: 24 yo F, enjoys movies and kids though there are times where she does not really want to talk to people. Does not have a lot of social support at home.     Sickle Cell Disease History  Primary Hematologist Team: Jose Rafael Duncan  PCP: none  Genotype: SS  Acute Pain Crisis Treatment: (limiting IV   ER   Dilaudid 1 mg IV q1h up to 3 doses  Toradol 30 mg IV x1   Maintenance IV fluids with LR  Other: Zofran 8 mg IV PRN nausea  Inpatient:  PCA Dilaudid 1 hr q30 minutes, no basal rate  Toradol 30 mg x6h x 4 hr  LR maintenance x 1-2 days  Other Medications: Zofran  ASA  Supportive Care: Docusate, Senna  Chronic Pain Medications:    Home regimen: oxycodone 15 mg p.o. q.4-6 hours p.r.n. breakthrough pain.  She also continues on Voltaren gel, and Zoloft among other medications.    -Also benefits from mental health visits, acupuncture  Baseline Hemoglobin: 7 g/dl without chronic transfusions  Hydroxyurea use: Yes  H/O blood transfusions: Yes, several (iron overload) Most recent 11/20/2021  H/O Transfusion Reactions: no  Antibodies:none  H/O Acute Chest Syndrome: Yes  Last episode:9/05/22 (previously 4/26/21, 10/2019)   ICU/intubation: not with 9/2022 " admission  H/O Stroke: Yes (managed with chronic transfusions in the past, stopped late Spring 2020)  H/O VTE: Yes (2/2021)  H/O Cholecystectomy or Splenectomy: no  H/O Asthma, Pulm HTN, AVN, Leg Ulcers, Nephropathy, Retinopathy, etc: Iron overload, asthma, chronic lung disease, physical limitations from early stroke    ---------------------------------------  Jennifer Cervantes's Goals (discussed today, 11/3/23)    1-3 month goal:  Reach a year with her boyfriend (achieved!)    6 month goal:  Save money for ultimate goal of moving out    12 month goal:  Move into her own place     Disease-specific goal(s):  Continue to wean oxycodone down, next with reduced doses. Minimize infusion center visits.  ---------------------------------------      Current Outpatient Medications   Medication Sig Dispense Refill    acetaminophen (TYLENOL) 325 MG tablet Take 2 tablets (650 mg) by mouth every 6 hours as needed for mild pain 120 tablet 3    albuterol (PROAIR HFA/PROVENTIL HFA/VENTOLIN HFA) 108 (90 Base) MCG/ACT inhaler Inhale 2 puffs into the lungs every 6 hours as needed for shortness of breath or wheezing 8.5 g 3    albuterol (PROVENTIL) (2.5 MG/3ML) 0.083% neb solution Take 2 vials (5 mg) by nebulization every 6 hours as needed for shortness of breath or wheezing 90 mL 3    aspirin (ASA) 81 MG chewable tablet Take 1 tablet (81 mg) by mouth 2 times daily 60 tablet 11    budesonide-formoterol (SYMBICORT) 160-4.5 MCG/ACT Inhaler Inhale 2 puffs into the lungs 2 times daily 10.2 g 3    cetirizine (ZYRTEC) 10 MG tablet Take 1 tablet (10 mg) by mouth daily 30 tablet 1    deferasirox (JADENU) 360 MG tablet Take 4 tablets (1,440 mg) by mouth every evening 120 tablet 4    diphenhydrAMINE (BENADRYL) 25 MG capsule Take 1 capsule (25 mg) by mouth every 6 hours as needed for itching or allergies 30 capsule 0    EPINEPHrine (ANY BX GENERIC EQUIV) 0.3 MG/0.3ML injection 2-pack Inject 0.3 mLs (0.3 mg) into the muscle as needed for anaphylaxis  (Patient not taking: Reported on 11/9/2023) 1 each 1    Hydroxyurea 1000 MG TABS Take 3,000 mg by mouth daily 90 tablet 3    methocarbamol (ROBAXIN) 750 MG tablet Take 1 tablet (750 mg) by mouth 4 times daily as needed for muscle spasms (during sickle pain crises. Okay to take scheduled while in pain) 60 tablet 1    naloxone (NARCAN) 4 MG/0.1ML nasal spray Spray 4 mg into one nostril alternating nostrils as needed for opioid reversal every 2-3 minutes until assistance arrives (Patient not taking: Reported on 11/9/2023)      ondansetron (ZOFRAN) 8 MG tablet Take 1 tablet (8 mg) by mouth every 8 hours as needed 30 tablet 1    oxyCODONE IR (ROXICODONE) 10 MG tablet Take 1 tablet (10 mg) by mouth every 4 hours as needed for severe pain or breakthrough pain Goal 4 per day. Max 6 per day. 25 tablet 0    predniSONE (DELTASONE) 20 MG tablet Take 1 tablet (20 mg) by mouth daily (Patient not taking: Reported on 11/13/2023) 3 tablet 0       Past Medical History  Past Medical History:   Diagnosis Date    Anxiety     Bleeding disorder (H24)     Blood clotting disorder (H24)     Cerebral infarction (H) 2015    Cognitive developmental delay     low IQ. 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 side (H)     right hand contractures    Iron overload due to repeated red blood cell transfusions     Migraines     Multiple subsegmental pulmonary emboli without acute cor pulmonale (H) 02/01/2021    Oppositional defiant behavior     Presence of intrauterine contraceptive device 2/18/2020    Superficial venous thrombosis of arm, right 03/25/2021    Uncomplicated asthma      Past Surgical History:   Procedure Laterality Date    AS INSERT TUNNELED CV 2 CATH W/O PORT/PUMP      CHOLECYSTECTOMY      CV RIGHT HEART CATH MEASUREMENTS RECORDED N/A 11/18/2021    Procedure: Right Heart Cath;  Surgeon: Jackson Stauffer MD;   Location:  HEART CARDI[facility] CATH LAB    INSERT PORT VASCULAR ACCESS Left 4/21/2021    Procedure: INSERTION, VASCULAR ACCESS PORT (NOT SURE ON SIDE UNTIL REMOVAL);  Surgeon: Rajan More MD;  Location: [facility] OR    IR CHEST PORT PLACEMENT > 5 YRS OF AGE  4/21/2021    IR CVC NON TUNNEL LINE REMOVAL  5/6/2021    IR CVC NON TUNNEL PLACEMENT > 5 YRS  04/07/2020    IR CVC NON TUNNEL PLACEMENT > 5 YRS  4/30/2021    IR CVC NON TUNNEL PLACEMENT > 5 YRS  9/7/2022    IR PORT REMOVAL LEFT  4/21/2021    REMOVE PORT VASCULAR ACCESS Left 4/21/2021    Procedure: REMOVAL, VASCULAR ACCESS PORT LEFT;  Surgeon: Rajan More MD;  Location: [facility] OR    REPAIR TENDON ELBOW Right 10/02/2019    Procedure: Right Elbow Flexor Lengthening, Flexor Pronator Slide Of Wrist and Finger, Thumb Adductor Lengthening;  Surgeon: Anai Franco MD;  Location: [facility] OR    TONSILLECTOMY Bilateral 10/02/2019    Procedure: Bilateral Tonsillectomy;  Surgeon: Farhana Guy MD;  Location: [facility] OR    ZZC BREAST REDUCTION (INCLUDES LIPO) TIER 3 Bilateral 04/23/2019     Allergies   Allergen Reactions    Contrast Dye      Hives and breathing issues    Fish-Derived Products Hives    Seafood Hives    Adhesive Tape Hives     Primipore dressing causes hives    Gadolinium     Iodinated Contrast Media      Social History   Social History     Tobacco Use    Smoking status: Never     Passive exposure: Never    Smokeless tobacco: Never   Substance Use Topics    Alcohol use: Not Currently     Alcohol/week: 0.0 standard drinks of alcohol    Drug use: Never    Still living at home with mom and extended family.     Past medical history and social history were reviewed.    Physical Examination:  There were no vitals taken for this visit.     Wt Readings from Last 10 Encounters:   11/24/23 66.1 kg (145 lb 12.8 oz)   11/11/23 66 kg (145 lb 8 oz)   11/01/23 65.9 kg (145 lb 4.8 oz)   10/27/23 68 kg (150 lb)   10/13/23 70.3 kg (155 lb)   10/12/23 66.9 kg (147 lb 6.4  oz)   10/02/23 66.6 kg (146 lb 12.8 oz)   09/22/23 68 kg (150 lb)   09/08/23 65.4 kg (144 lb 3.2 oz)   08/11/23 65.5 kg (144 lb 4.8 oz)     General: Pleasant female, NAD, smiling and happy today  Eyes: EOMI, PERRL. Mild. scleral icterus.  Respiratory: Normal respiratory effort.  Ext: No peripheral edema.  MSK: Contractured right arm and hand 2/2 stoke.  Neurologic: Grossly nonfocal. A/O x 4.  Skin: No rashes, petechiae, or bruising noted on exposed skin. Port accessed in left chest    Laboratory Data:  Recent Results (from the past 240 hour(s))   Extra Blue Top Tube    Collection Time: 11/17/23  7:09 PM   Result Value Ref Range    Hold Specimen JIC    Extra Red Top Tube    Collection Time: 11/17/23  7:09 PM   Result Value Ref Range    Hold Specimen JIC    Extra Green Top (Lithium Heparin) Tube    Collection Time: 11/17/23  7:09 PM   Result Value Ref Range    Hold Specimen JIC    Extra Green Top (Lithium Heparin) Tube    Collection Time: 11/17/23  7:09 PM   Result Value Ref Range    Hold Specimen JIC    Extra Purple Top Tube    Collection Time: 11/17/23  7:09 PM   Result Value Ref Range    Hold Specimen     Extra Purple Top Tube    Collection Time: 11/17/23  7:09 PM   Result Value Ref Range    Hold Specimen JIC    Comprehensive metabolic panel    Collection Time: 11/17/23  7:09 PM   Result Value Ref Range    Sodium 139 135 - 145 mmol/L    Potassium 4.2 3.4 - 5.3 mmol/L    Carbon Dioxide (CO2) 24 22 - 29 mmol/L    Anion Gap 10 7 - 15 mmol/L    Urea Nitrogen 13.2 6.0 - 20.0 mg/dL    Creatinine 0.50 (L) 0.51 - 0.95 mg/dL    GFR Estimate >90 >60 mL/min/1.73m2    Calcium 9.1 8.6 - 10.0 mg/dL    Chloride 105 98 - 107 mmol/L    Glucose 109 (H) 70 - 99 mg/dL    Alkaline Phosphatase 72 40 - 150 U/L    AST 45 0 - 45 U/L    ALT 27 0 - 50 U/L    Protein Total 7.7 6.4 - 8.3 g/dL    Albumin 4.7 3.5 - 5.2 g/dL    Bilirubin Total 4.1 (H) <=1.2 mg/dL   Reticulocyte count    Collection Time: 11/17/23  7:09 PM   Result Value Ref Range     % Reticulocyte 21.1 (H) 0.5 - 2.0 %    Absolute Reticulocyte 0.670 (H) 0.025 - 0.095 10e6/uL   CBC with platelets and differential    Collection Time: 11/17/23  7:09 PM   Result Value Ref Range    WBC Count 12.0 (H) 4.0 - 11.0 10e3/uL    RBC Count 3.11 (L) 3.80 - 5.20 10e6/uL    Hemoglobin 10.4 (L) 11.7 - 15.7 g/dL    Hematocrit 29.1 (L) 35.0 - 47.0 %    MCV 94 78 - 100 fL    MCH 33.4 (H) 26.5 - 33.0 pg    MCHC 35.7 31.5 - 36.5 g/dL    RDW 23.0 (H) 10.0 - 15.0 %    Platelet Count 458 (H) 150 - 450 10e3/uL    % Neutrophils 73 %    % Lymphocytes 15 %    % Monocytes 7 %    % Eosinophils 4 %    % Basophils 1 %    % Immature Granulocytes 0 %    NRBCs per 100 WBC 4 (H) <1 /100    Absolute Neutrophils 8.8 (H) 1.6 - 8.3 10e3/uL    Absolute Lymphocytes 1.8 0.8 - 5.3 10e3/uL    Absolute Monocytes 0.9 0.0 - 1.3 10e3/uL    Absolute Eosinophils 0.5 0.0 - 0.7 10e3/uL    Absolute Basophils 0.1 0.0 - 0.2 10e3/uL    Absolute Immature Granulocytes 0.1 <=0.4 10e3/uL    Absolute NRBCs 0.4 10e3/uL   Comprehensive metabolic panel    Collection Time: 11/24/23  9:42 AM   Result Value Ref Range    Sodium 139 135 - 145 mmol/L    Potassium 4.0 3.4 - 5.3 mmol/L    Carbon Dioxide (CO2) 25 22 - 29 mmol/L    Anion Gap 8 7 - 15 mmol/L    Urea Nitrogen 8.2 6.0 - 20.0 mg/dL    Creatinine 0.52 0.51 - 0.95 mg/dL    GFR Estimate >90 >60 mL/min/1.73m2    Calcium 9.3 8.6 - 10.0 mg/dL    Chloride 106 98 - 107 mmol/L    Glucose 89 70 - 99 mg/dL    Alkaline Phosphatase 67 40 - 150 U/L    AST 50 (H) 0 - 45 U/L    ALT 30 0 - 50 U/L    Protein Total 7.2 6.4 - 8.3 g/dL    Albumin 4.6 3.5 - 5.2 g/dL    Bilirubin Total 3.4 (H) <=1.2 mg/dL   Reticulocyte count    Collection Time: 11/24/23  9:42 AM   Result Value Ref Range    % Reticulocyte 17.3 (H) 0.5 - 2.0 %    Absolute Reticulocyte 0.417 (H) 0.025 - 0.095 10e6/uL   HCG quantitative pregnancy    Collection Time: 11/24/23  9:42 AM   Result Value Ref Range    hCG Quantitative <1 <5 mIU/mL   CBC with platelets  and differential    Collection Time: 11/24/23  9:42 AM   Result Value Ref Range    WBC Count 12.3 (H) 4.0 - 11.0 10e3/uL    RBC Count 2.41 (L) 3.80 - 5.20 10e6/uL    Hemoglobin 7.7 (L) 11.7 - 15.7 g/dL    Hematocrit 21.9 (L) 35.0 - 47.0 %    MCV 91 78 - 100 fL    MCH 32.0 26.5 - 33.0 pg    MCHC 35.2 31.5 - 36.5 g/dL    RDW 21.8 (H) 10.0 - 15.0 %    Platelet Count 459 (H) 150 - 450 10e3/uL    % Neutrophils 62 %    % Lymphocytes 19 %    % Monocytes 10 %    % Eosinophils 6 %    % Basophils 3 %    % Immature Granulocytes 0 %    NRBCs per 100 WBC 2 (H) <1 /100    Absolute Neutrophils 7.7 1.6 - 8.3 10e3/uL    Absolute Lymphocytes 2.4 0.8 - 5.3 10e3/uL    Absolute Monocytes 1.2 0.0 - 1.3 10e3/uL    Absolute Eosinophils 0.7 0.0 - 0.7 10e3/uL    Absolute Basophils 0.3 (H) 0.0 - 0.2 10e3/uL    Absolute Immature Granulocytes 0.1 <=0.4 10e3/uL    Absolute NRBCs 0.2 10e3/uL       Assessment and Plan:  1. Sickle Cell HgbSS Disease  2. Acute pain crises  3. Chronic Pain  4. 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 History of CVA  7. Hearing loss    Jennifer is seen today for routine hematology follow-up and monthly sidney infusion. She has had an increase in ED visits this past month with a total of 4 visits for sickle cell pain crisis. It seems that the change in weather as well as more physical demand with her new job may be contributing to the increase in pain. She denies any recent concerns with her breathing or asthma exacerbations.     She continues to work toward tapering oxycodone although is now receiving 25 tablets of oxycodone (15 mg) weekly which is an increase from 10 tablets weekly. She believes she was prescribed an increased quantity in an effort to effectively manage her pain crises outpatient with the hope to avoid ED visits.    We again reviewed our goal to get back on track with her oxycodone taper. I proposed we either decrease her quantity or decrease the  oxycodone dose at this time. Jennifer would like to continue with her current prescription of 25 tablets today and decrease quantity to 10 tablets next week at a continued dose of 15 mg. Once she has successfully returned to a 10 tablet refill we will begin to taper her dose, next to 10 mg, which she is agreeable to.     She is due for a COVID booster today although declines stating she knows many family members who have become ill to the point of requiring hospitalization following COVID vaccination. She feels that since she is at high risk due to underlying sickle cell, history of blood clots and asthma that the vaccine would be dangerous. I explained that a symptomatic immune response is normal and expected following vaccination and that she in fact is at a high risk for complications of thrombosis, sickle cell crises or asthma exacerbation with COVID infection.    Her ED pain pain has been adjusted to now be able to receive IV dilaudid and take a break from ketamine (updated 10/2/23. She feels that sidney infusions are helpful in reducing crises. We will continue monthly infusions.    Desferal is currently on hold due to desire to stop/hold infusion due to decreased quality of life related to the amount of time she needed to be connected for infusion. Remains on Jadenu. Her Ferriscan in September 2023 did show improvement from last year. We do need to make sure she gets back into audiology and ophthalmology annually due to chelator. Last eye exam was recently completed on 10/25/23. Last audiology exam/audiogram performed 6/22/22. Will request audiology visit today.      Plan:  -Continue Hydrea to 3000mg daily to help lessen frequency of sickle cell pain.   -She has resumed crizanlizumab infusions which we will continue monthly  -Continue slow taper of oxycodone. Prescription was recently increased to 25 tablets per week, 15 mg tablets. Following discussion with Jennifer, will plan to stay at 25 tablets with refill  today and decrease back to 10 tablets per week next week which is Jennifer's preference. Following quantity taper, will continue to work on reducing dose. She can self-reduce infusion days/week and we will continue to keep the cap at 2/week for now.  -Continue infusion center visits limited to two times per week (Mondays and Fridays ideally although still needs to call to request). Continue diligent home management with current medications, heat, rest, compression, warm baths. Methocarbamol was recently added which Jennifer is utilizing and finds helpful.  -If unable to manage at home can go to ED  with continued plan to use IV Dilaudid and take a break from ketamine (10/2/23). No PCA use and goal for any admission would still be to discharge by 5 days or less  - Desferal infusions on hold. Continue Jadenu. Repeat Ferriscan in 4-6 months (January-March 2024  -Continue aspirin BID  -RTC with me in 2 months, coordinate with sidney infusions. Again reviewed recommendation for COVID vaccination which, after detailed discussion, she again declines today.    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 She had multiple thromboembolic events in 2021 despite adherent anticoagulation use (though warfarin was perpetually low) and there are concerns for chronic thromboembolic disease but did not have pulmonary HTN on a November 2021 cath. She is  maintained on chronic PO opioids and twice-weekly infusion visits (since 1/24/22) but has been able to be maintained on this regimen and has stayed out of the ED most of the time with even rarer admissions.     Interval History:  Jennifer is seen for an interim visit due to recent increase in sickle cell pain. So far in November she has had four ED visits due to increased pain. Her primary issue the past few weeks has been shortness of breath. She's had difficulty standing for long periods of time without feeling short of breath. She has been using her albuterol inhaler and nebulizer frequently and has currently run out of both medications. She occasionally wakes up from sleep coughing. She can hear audible wheezes at times. She does acknowledge that there are days when she only needs to use her nebulizer minimally. With the persistent breathing difficulties, she has had significant pain in her chest. She has a dry cough which induces pain bilaterally. She has been out of work most of the past month due to pain and need for both infusion center and ED visits. She still wishes to be able to work and make money but is frustrated because her hours were recently cut drastically. She is only scheduled for two 2-hour shifts this week. Apparently full-time employees are given priority with hours. Because of this she is working on looking for a new job, preferably a desk job that is less demanding. Her recent increased pain and respiratory symptoms began after her last blood transfusion. Initially she felt better following the transfusion but in the days following began experiencing the pain in her chest. A recent VQ scan was performed 11/21 which was negative for PE.    Sickle Cell Disease Comprehensive Checklist  Bone Health/Avascular Necrosis Screening/Symptoms (each visit): no new concerns today  Leg Ulcer evaluation (every visit): none  Hypertension (every visit):stable 11/3/23  Last pulmonary evaluation (asthma, AMAN,  "pulm HTN): 9/28/22, due for follow-up  Stroke/silent cerebral infarct Hx (Y/N): Yes TIA ~2014, first event ~age 1 with full stroke and R sided weakness  Last PCP Visit: 3/6/23  Vaccines:  PCV13: 5/13/19  Pneumovax (PPSV23): 3/04, 10/09, 7/12/19 (next due 7/2024)  Menactra: 4/2010, 9/2015 (MCV done 8/16/21)  MenB: 9/16/15, 5/13/19  Influenza: 10/2/23  Audiology (chelation): done 6/2020, normal.. However, on 6/7, \"While there is no significant change in grade on the CTCAE 4.03 Scale, there were hearing changes bilaterally for both standard and extended high frequency audiometry.  Will need to determine if an ENT consult is advised due to the asymmetry in extended high frequency testing.\"     Plan last reviewed with patient: 10/2/23    Patient background: 22 yo F, enjoys movies and kids though there are times where she does not really want to talk to people. Does not have a lot of social support at home.     Sickle Cell Disease History  Primary Hematologist Team: Jose Rafael Duncan  PCP: none  Genotype: SS  Acute Pain Crisis Treatment: (limiting IV   ER   Dilaudid 1 mg IV q1h up to 3 doses  Toradol 30 mg IV x1   Maintenance IV fluids with LR  Other: Zofran 8 mg IV PRN nausea  Inpatient:  PCA Dilaudid 1 hr q30 minutes, no basal rate  Toradol 30 mg x6h x 4 hr  LR maintenance x 1-2 days  Other Medications: Zofran  ASA  Supportive Care: Docusate, Senna  Chronic Pain Medications:    Home regimen: oxycodone 15 mg p.o. q.4-6 hours p.r.n. breakthrough pain.  She also continues on Voltaren gel, and Zoloft among other medications.    -Also benefits from mental health visits, acupuncture  Baseline Hemoglobin: 7 g/dl without chronic transfusions  Hydroxyurea use: Yes  H/O blood transfusions: Yes, several (iron overload) Most recent 11/20/2021  H/O Transfusion Reactions: no  Antibodies:none  H/O Acute Chest Syndrome: Yes  Last episode:9/05/22 (previously 4/26/21, 10/2019)   ICU/intubation: not with 9/2022 admission  H/O Stroke: Yes " (managed with chronic transfusions in the past, stopped late Spring 2020)  H/O VTE: Yes (2/2021)  H/O Cholecystectomy or Splenectomy: no  H/O Asthma, Pulm HTN, AVN, Leg Ulcers, Nephropathy, Retinopathy, etc: Iron overload, asthma, chronic lung disease, physical limitations from early stroke    ---------------------------------------  Jennifer Cervantes's Goals (discussed today, 11/27/23)    1-3 month goal:  Look for a new job    6 month goal:  Save money for ultimate goal of moving out    12 month goal:  Move into her own place     Disease-specific goal(s):  Continue to wean oxycodone down, next with reduced doses. Minimize infusion center visits.  ---------------------------------------      Current Outpatient Medications   Medication Sig Dispense Refill     acetaminophen (TYLENOL) 325 MG tablet Take 2 tablets (650 mg) by mouth every 6 hours as needed for mild pain 120 tablet 3     albuterol (PROAIR HFA/PROVENTIL HFA/VENTOLIN HFA) 108 (90 Base) MCG/ACT inhaler Inhale 2 puffs into the lungs every 6 hours as needed for shortness of breath or wheezing 8.5 g 3     albuterol (PROVENTIL) (2.5 MG/3ML) 0.083% neb solution Take 2 vials (5 mg) by nebulization every 6 hours as needed for shortness of breath or wheezing 90 mL 3     aspirin (ASA) 81 MG chewable tablet Take 1 tablet (81 mg) by mouth 2 times daily 60 tablet 11     budesonide-formoterol (SYMBICORT) 160-4.5 MCG/ACT Inhaler Inhale 2 puffs into the lungs 2 times daily 10.2 g 3     cetirizine (ZYRTEC) 10 MG tablet Take 1 tablet (10 mg) by mouth daily 30 tablet 1     deferasirox (JADENU) 360 MG tablet Take 4 tablets (1,440 mg) by mouth every evening 120 tablet 4     diphenhydrAMINE (BENADRYL) 25 MG capsule Take 1 capsule (25 mg) by mouth every 6 hours as needed for itching or allergies 30 capsule 0     Hydroxyurea 1000 MG TABS Take 3,000 mg by mouth daily 90 tablet 3     methocarbamol (ROBAXIN) 750 MG tablet Take 1 tablet (750 mg) by mouth 4 times daily as needed for muscle  spasms (during sickle pain crises. Okay to take scheduled while in pain) 60 tablet 1     ondansetron (ZOFRAN) 8 MG tablet Take 1 tablet (8 mg) by mouth every 8 hours as needed 30 tablet 1     [START ON 12/1/2023] oxyCODONE IR (ROXICODONE) 10 MG tablet Take 1 tablet (10 mg) by mouth every 4 hours as needed for severe pain or breakthrough pain Goal 4 per day. Max 6 per day. 25 tablet 0     EPINEPHrine (ANY BX GENERIC EQUIV) 0.3 MG/0.3ML injection 2-pack Inject 0.3 mLs (0.3 mg) into the muscle as needed for anaphylaxis (Patient not taking: Reported on 11/9/2023) 1 each 1     naloxone (NARCAN) 4 MG/0.1ML nasal spray Spray 4 mg into one nostril alternating nostrils as needed for opioid reversal every 2-3 minutes until assistance arrives (Patient not taking: Reported on 11/9/2023)         Past Medical History  Past Medical History:   Diagnosis Date     Anxiety      Bleeding disorder (H24)      Blood clotting disorder (H24)      Cerebral infarction (H) 2015     Cognitive developmental delay     low IQ. Please recognize when managing pain and planning with her     Depressive disorder      Hemiplegia and hemiparesis following cerebral infarction affecting right dominant side (H)     right hand contractures     Iron overload due to repeated red blood cell transfusions      Migraines      Multiple subsegmental pulmonary emboli without acute cor pulmonale (H) 02/01/2021     Oppositional defiant behavior      Presence of intrauterine contraceptive device 2/18/2020     Superficial venous thrombosis of arm, right 03/25/2021     Uncomplicated asthma      Past Surgical History:   Procedure Laterality Date     AS INSERT TUNNELED CV 2 CATH W/O PORT/PUMP       CHOLECYSTECTOMY       CV RIGHT HEART CATH MEASUREMENTS RECORDED N/A 11/18/2021    Procedure: Right Heart Cath;  Surgeon: Jackson Stauffer MD;  Location:  [facility]     INSERT PORT VASCULAR ACCESS Left 4/21/2021    Procedure: INSERTION, VASCULAR ACCESS PORT (NOT  SURE ON SIDE UNTIL REMOVAL);  Surgeon: Rajan More MD;  Location: [facility] OR     IR CHEST PORT PLACEMENT > 5 YRS OF AGE  4/21/2021     IR CVC NON TUNNEL LINE REMOVAL  5/6/2021     IR CVC NON TUNNEL PLACEMENT > 5 YRS  04/07/2020     IR CVC NON TUNNEL PLACEMENT > 5 YRS  4/30/2021     IR CVC NON TUNNEL PLACEMENT > 5 YRS  9/7/2022     IR PORT REMOVAL LEFT  4/21/2021     REMOVE PORT VASCULAR ACCESS Left 4/21/2021    Procedure: REMOVAL, VASCULAR ACCESS PORT LEFT;  Surgeon: Rajan More MD;  Location: [facility] OR     REPAIR TENDON ELBOW Right 10/02/2019    Procedure: Right Elbow Flexor Lengthening, Flexor Pronator Slide Of Wrist and Finger, Thumb Adductor Lengthening;  Surgeon: Anai Franco MD;  Location: [facility]     TONSILLECTOMY Bilateral 10/02/2019    Procedure: Bilateral Tonsillectomy;  Surgeon: Farhana Guy MD;  Location: [facility] OR     ZZC BREAST REDUCTION (INCLUDES LIPO) TIER 3 Bilateral 04/23/2019     Allergies   Allergen Reactions     Contrast Dye      Hives and breathing issues     Fish-Derived Products Hives     Seafood Hives     Adhesive Tape Hives     Primipore dressing causes hives     Gadolinium      Iodinated Contrast Media      Social History   Social History     Tobacco Use     Smoking status: Never     Passive exposure: Never     Smokeless tobacco: Never   Substance Use Topics     Alcohol use: Not Currently     Alcohol/week: 0.0 standard drinks of alcohol     Drug use: Never    Still living at home with mom and extended family.   Past medical history and social history were reviewed.    Physical Examination:  /78 (BP Location: Right arm, Patient Position: Sitting, Cuff Size: Adult Regular)   Pulse 93   Temp 98.2  F (36.8  C) (Oral)   Resp 18   Wt 66.5 kg (146 lb 9.6 oz)   SpO2 98%   BMI 25.16 kg/m       Wt Readings from Last 10 Encounters:   11/27/23 66.5 kg (146 lb 9.6 oz)   11/24/23 66.1 kg (145 lb 12.8 oz)   11/11/23 66 kg (145 lb 8 oz)   11/01/23 65.9 kg (145 lb 4.8 oz)    10/27/23 68 kg (150 lb)   10/13/23 70.3 kg (155 lb)   10/12/23 66.9 kg (147 lb 6.4 oz)   10/02/23 66.6 kg (146 lb 12.8 oz)   09/22/23 68 kg (150 lb)   09/08/23 65.4 kg (144 lb 3.2 oz)     General: Pleasant female, NAD  Eyes: EOMI, PERRL. Mild. scleral icterus.  Respiratory: CTA bilaterally, diminished in bases. No wheezing.  Cardiac: RRR, no m/g/r  Ext: No peripheral edema.  MSK: Contractured right arm and hand 2/2 stoke.  Neurologic: Grossly nonfocal. A/O x 4.  Skin: No rashes, petechiae, or bruising noted on exposed skin. Port accessed in left chest    Laboratory Data:  Recent Results (from the past 240 hour(s))   Extra Blue Top Tube    Collection Time: 11/17/23  7:09 PM   Result Value Ref Range    Hold Specimen JIC    Extra Red Top Tube    Collection Time: 11/17/23  7:09 PM   Result Value Ref Range    Hold Specimen JIC    Extra Green Top (Lithium Heparin) Tube    Collection Time: 11/17/23  7:09 PM   Result Value Ref Range    Hold Specimen JIC    Extra Green Top (Lithium Heparin) Tube    Collection Time: 11/17/23  7:09 PM   Result Value Ref Range    Hold Specimen JIC    Extra Purple Top Tube    Collection Time: 11/17/23  7:09 PM   Result Value Ref Range    Hold Specimen     Extra Purple Top Tube    Collection Time: 11/17/23  7:09 PM   Result Value Ref Range    Hold Specimen JIC    Comprehensive metabolic panel    Collection Time: 11/17/23  7:09 PM   Result Value Ref Range    Sodium 139 135 - 145 mmol/L    Potassium 4.2 3.4 - 5.3 mmol/L    Carbon Dioxide (CO2) 24 22 - 29 mmol/L    Anion Gap 10 7 - 15 mmol/L    Urea Nitrogen 13.2 6.0 - 20.0 mg/dL    Creatinine 0.50 (L) 0.51 - 0.95 mg/dL    GFR Estimate >90 >60 mL/min/1.73m2    Calcium 9.1 8.6 - 10.0 mg/dL    Chloride 105 98 - 107 mmol/L    Glucose 109 (H) 70 - 99 mg/dL    Alkaline Phosphatase 72 40 - 150 U/L    AST 45 0 - 45 U/L    ALT 27 0 - 50 U/L    Protein Total 7.7 6.4 - 8.3 g/dL    Albumin 4.7 3.5 - 5.2 g/dL    Bilirubin Total 4.1 (H) <=1.2 mg/dL    Reticulocyte count    Collection Time: 11/17/23  7:09 PM   Result Value Ref Range    % Reticulocyte 21.1 (H) 0.5 - 2.0 %    Absolute Reticulocyte 0.670 (H) 0.025 - 0.095 10e6/uL   CBC with platelets and differential    Collection Time: 11/17/23  7:09 PM   Result Value Ref Range    WBC Count 12.0 (H) 4.0 - 11.0 10e3/uL    RBC Count 3.11 (L) 3.80 - 5.20 10e6/uL    Hemoglobin 10.4 (L) 11.7 - 15.7 g/dL    Hematocrit 29.1 (L) 35.0 - 47.0 %    MCV 94 78 - 100 fL    MCH 33.4 (H) 26.5 - 33.0 pg    MCHC 35.7 31.5 - 36.5 g/dL    RDW 23.0 (H) 10.0 - 15.0 %    Platelet Count 458 (H) 150 - 450 10e3/uL    % Neutrophils 73 %    % Lymphocytes 15 %    % Monocytes 7 %    % Eosinophils 4 %    % Basophils 1 %    % Immature Granulocytes 0 %    NRBCs per 100 WBC 4 (H) <1 /100    Absolute Neutrophils 8.8 (H) 1.6 - 8.3 10e3/uL    Absolute Lymphocytes 1.8 0.8 - 5.3 10e3/uL    Absolute Monocytes 0.9 0.0 - 1.3 10e3/uL    Absolute Eosinophils 0.5 0.0 - 0.7 10e3/uL    Absolute Basophils 0.1 0.0 - 0.2 10e3/uL    Absolute Immature Granulocytes 0.1 <=0.4 10e3/uL    Absolute NRBCs 0.4 10e3/uL   Comprehensive metabolic panel    Collection Time: 11/24/23  9:42 AM   Result Value Ref Range    Sodium 139 135 - 145 mmol/L    Potassium 4.0 3.4 - 5.3 mmol/L    Carbon Dioxide (CO2) 25 22 - 29 mmol/L    Anion Gap 8 7 - 15 mmol/L    Urea Nitrogen 8.2 6.0 - 20.0 mg/dL    Creatinine 0.52 0.51 - 0.95 mg/dL    GFR Estimate >90 >60 mL/min/1.73m2    Calcium 9.3 8.6 - 10.0 mg/dL    Chloride 106 98 - 107 mmol/L    Glucose 89 70 - 99 mg/dL    Alkaline Phosphatase 67 40 - 150 U/L    AST 50 (H) 0 - 45 U/L    ALT 30 0 - 50 U/L    Protein Total 7.2 6.4 - 8.3 g/dL    Albumin 4.6 3.5 - 5.2 g/dL    Bilirubin Total 3.4 (H) <=1.2 mg/dL   Reticulocyte count    Collection Time: 11/24/23  9:42 AM   Result Value Ref Range    % Reticulocyte 17.3 (H) 0.5 - 2.0 %    Absolute Reticulocyte 0.417 (H) 0.025 - 0.095 10e6/uL   HCG quantitative pregnancy    Collection Time: 11/24/23  9:42  AM   Result Value Ref Range    hCG Quantitative <1 <5 mIU/mL   CBC with platelets and differential    Collection Time: 11/24/23  9:42 AM   Result Value Ref Range    WBC Count 12.3 (H) 4.0 - 11.0 10e3/uL    RBC Count 2.41 (L) 3.80 - 5.20 10e6/uL    Hemoglobin 7.7 (L) 11.7 - 15.7 g/dL    Hematocrit 21.9 (L) 35.0 - 47.0 %    MCV 91 78 - 100 fL    MCH 32.0 26.5 - 33.0 pg    MCHC 35.2 31.5 - 36.5 g/dL    RDW 21.8 (H) 10.0 - 15.0 %    Platelet Count 459 (H) 150 - 450 10e3/uL    % Neutrophils 62 %    % Lymphocytes 19 %    % Monocytes 10 %    % Eosinophils 6 %    % Basophils 3 %    % Immature Granulocytes 0 %    NRBCs per 100 WBC 2 (H) <1 /100    Absolute Neutrophils 7.7 1.6 - 8.3 10e3/uL    Absolute Lymphocytes 2.4 0.8 - 5.3 10e3/uL    Absolute Monocytes 1.2 0.0 - 1.3 10e3/uL    Absolute Eosinophils 0.7 0.0 - 0.7 10e3/uL    Absolute Basophils 0.3 (H) 0.0 - 0.2 10e3/uL    Absolute Immature Granulocytes 0.1 <=0.4 10e3/uL    Absolute NRBCs 0.2 10e3/uL   Ferritin    Collection Time: 11/27/23 10:17 AM   Result Value Ref Range    Ferritin 6,182 (H) 6 - 175 ng/mL   Comprehensive metabolic panel    Collection Time: 11/27/23 10:17 AM   Result Value Ref Range    Sodium 139 135 - 145 mmol/L    Potassium 3.4 3.4 - 5.3 mmol/L    Carbon Dioxide (CO2) 21 (L) 22 - 29 mmol/L    Anion Gap 12 7 - 15 mmol/L    Urea Nitrogen 4.9 (L) 6.0 - 20.0 mg/dL    Creatinine 0.42 (L) 0.51 - 0.95 mg/dL    GFR Estimate >90 >60 mL/min/1.73m2    Calcium 9.0 8.6 - 10.0 mg/dL    Chloride 106 98 - 107 mmol/L    Glucose 105 (H) 70 - 99 mg/dL    Alkaline Phosphatase 70 40 - 150 U/L    AST 47 (H) 0 - 45 U/L    ALT 23 0 - 50 U/L    Protein Total 7.5 6.4 - 8.3 g/dL    Albumin 4.5 3.5 - 5.2 g/dL    Bilirubin Total 3.3 (H) <=1.2 mg/dL       Assessment and Plan:  1. Sickle Cell HgbSS Disease  2. Acute pain crises  3. Chronic Pain  4. 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 History of CVA  7. Hearing loss    Jennifer is  seen today for interim follow-up due to recent increase in sickle cell pain and dyspnea. She now feels as if she is experiencing an exacerbation of asthma, evidenced by activity intolerance and increased shortness of breath with activity. Her oxygen saturation in clinic today is reassuring at 98%. She did confirm she took an albuterol nebulizar prior to coming to clinic. Her primarily concern is  getting her sickle cell pain better controlled which she is trying to achieve with routine use of home medications, heat, infusion center visits and limited ED visits. The drastic change in weather has historically been a trigger for her. I am concerned that her physical exertion at work has also contributed to her recent symptoms. We also discussed the possibility of viral illness exacerbating her symptoms although she is not currently exhibiting any symptoms concerning for infection.    She will receive IV fluids and pain medication in clinic today. We can proceed with sidney on Friday 12/1 as scheduled. I anticipate she will need or request an additional infusion for fluid and pain medication which is appropriate. She is aware to call to formally request this accommodation. She has now had at least two significant exacerbations of asthma symptoms this year. Her last pulmonology visit was September 2022. Will request pulm follow-up for close monitoring.     She continues to work toward tapering oxycodone although is now receiving 25 tablets of oxycodone (15 mg) weekly which is an increase from 10 tablets weekly. She believes she was prescribed an increased quantity in an effort to effectively manage her pain crises outpatient with the hope to avoid ED visits.    Our shared, ongoing goal is to get back on track with her oxycodone taper. She most recently was agreeable to receiving a decreased dose (10 mg) with qty 25 tablets weekly. We will continue with her current prescription due her recent increase in ED utilization and  revisit potential taper week by week. A prescription was queued for refill this Friday, 12/1/23.    Her ED pain pain has been adjusted to now be able to receive IV dilaudid and take a break from ketamine (updated 10/2/23. She feels that sidney infusions are helpful in reducing crises. We will continue monthly infusions.    Desferal is currently on hold due to desire to stop/hold infusion due to decreased quality of life related to the amount of time she needed to be connected for infusion. Remains on Jadenu. Her Ferriscan in September 2023 did show improvement from last year. We do need to make sure she gets back into audiology and ophthalmology annually due to chelator. Last eye exam was recently completed on 10/25/23. Audiology visit scheduled 2/7/24.      Plan:  -Pulmonology follow-up requested  -Check CBC and retic today  -Continue Hydrea to 3000mg daily to help lessen frequency of sickle cell pain.   -Continue monthly crizanlizumab infusions.  -Continue slow taper of oxycodone. Currently receiving oxycodone 10 mg every 4 hours PRN, qty 25. Recommend tapering to qty 20 with 12/8 refill if Jennifer is agreeable  -She can self-reduce infusion days/week and we will continue to keep the cap at 2/week for now.  -Continue infusion center visits limited to two times per week (Mondays and Fridays ideally although still needs to call to request). Continue diligent home management with current medications, heat, rest, compression, warm baths. Methocarbamol was recently added which Jennifer is utilizing and finds helpful.  -If unable to manage at home can go to ED  with continued plan to use IV Dilaudid and take a break from ketamine (10/2/23). No PCA use and goal for any admission would still be to discharge by 5 days or less  - Desferal infusions on hold. Continue Jadenu. Repeat Ferriscan in 4-6 months (January-March 2024  -Continue aspirin BID  -RTC with me in 1 months, coordinate with sidney infusions.     52 minutes spent on  the date of the encounter doing chart review, review of test results, interpretation of tests, patient visit, and documentation     Patricia Mantilla CNP      Again, thank you for allowing me to participate in the care of your patient.        Sincerely,        Patricia Mantilla CNP

## 2023-11-28 ENCOUNTER — NURSE TRIAGE (OUTPATIENT)
Dept: ONCOLOGY | Facility: CLINIC | Age: 23
End: 2023-11-28
Payer: COMMERCIAL

## 2023-11-28 LAB
ABO/RH(D): NORMAL
ANTIBODY SCREEN: NEGATIVE
SPECIMEN EXPIRATION DATE: NORMAL

## 2023-11-28 NOTE — TELEPHONE ENCOUNTER
Jennifer is calling in to request a call back from team regarding when/if she needs a transfusion appointment.   She states she can take the call between 3:30 and 4:00 and would like team to call her then if possible. She will be at work until then and will be unable to take a call.    Per Patricia's note from yesterday:  Addendum:   Hgb today low at 6.5. Offered to proceed with 1 unit PRBC transfusion as she has been increasingly symptomatic or monitor with repeat CBC in 2 days. Jennifer would prefer to proceed with transfusion.    This writer informed caller that this message would be sent to her Care Team.    Secure chat sent to Arely Krueger and Patricia Mantilla.  Patricia will order type and screen to be added on from labs yesterday.  Ok to request apt for transfusion the next day.    0745: Called lab and they are unable to add on the type and screen.  Informed Care Team via secure chat.    Spoke with lab and they can have type and screen ready if pt can come today after work for lab draw.    Sent IB to Charge Nurse Pool, RNCC, Clinic Coordinators, and cc'd Triage.

## 2023-11-29 ENCOUNTER — PATIENT OUTREACH (OUTPATIENT)
Dept: ONCOLOGY | Facility: CLINIC | Age: 23
End: 2023-11-29

## 2023-11-29 ENCOUNTER — APPOINTMENT (OUTPATIENT)
Dept: GENERAL RADIOLOGY | Facility: CLINIC | Age: 23
End: 2023-11-29
Attending: EMERGENCY MEDICINE
Payer: COMMERCIAL

## 2023-11-29 ENCOUNTER — APPOINTMENT (OUTPATIENT)
Dept: LAB | Facility: CLINIC | Age: 23
End: 2023-11-29
Payer: COMMERCIAL

## 2023-11-29 ENCOUNTER — HOSPITAL ENCOUNTER (EMERGENCY)
Facility: CLINIC | Age: 23
Discharge: HOME | End: 2023-11-30
Attending: EMERGENCY MEDICINE | Admitting: EMERGENCY MEDICINE
Payer: COMMERCIAL

## 2023-11-29 DIAGNOSIS — J45.901 MILD ASTHMA WITH EXACERBATION, UNSPECIFIED WHETHER PERSISTENT: ICD-10-CM

## 2023-11-29 DIAGNOSIS — D57.00 SICKLE CELL PAIN CRISIS (H): ICD-10-CM

## 2023-11-29 LAB
ALBUMIN SERPL BCG-MCNC: 2.5 G/DL (ref 3.5–5.2)
ALP SERPL-CCNC: 39 U/L (ref 40–150)
ALT SERPL W P-5'-P-CCNC: 12 U/L (ref 0–50)
ANION GAP SERPL CALCULATED.3IONS-SCNC: 8 MMOL/L (ref 7–15)
AST SERPL W P-5'-P-CCNC: 28 U/L (ref 0–45)
BASOPHILS # BLD AUTO: ABNORMAL 10*3/UL
BASOPHILS # BLD MANUAL: 0.5 10E3/UL (ref 0–0.2)
BASOPHILS NFR BLD AUTO: ABNORMAL %
BASOPHILS NFR BLD MANUAL: 3 %
BILIRUB SERPL-MCNC: 1.8 MG/DL
BUN SERPL-MCNC: 4.5 MG/DL (ref 6–20)
CALCIUM SERPL-MCNC: 4.9 MG/DL (ref 8.6–10)
CHLORIDE SERPL-SCNC: 122 MMOL/L (ref 98–107)
CREAT SERPL-MCNC: 0.29 MG/DL (ref 0.51–0.95)
DEPRECATED HCO3 PLAS-SCNC: 16 MMOL/L (ref 22–29)
EGFRCR SERPLBLD CKD-EPI 2021: >90 ML/MIN/1.73M2
EOSINOPHIL # BLD AUTO: ABNORMAL 10*3/UL
EOSINOPHIL # BLD MANUAL: 1.5 10E3/UL (ref 0–0.7)
EOSINOPHIL NFR BLD AUTO: ABNORMAL %
EOSINOPHIL NFR BLD MANUAL: 10 %
ERYTHROCYTE [DISTWIDTH] IN BLOOD BY AUTOMATED COUNT: 20.2 % (ref 10–15)
GLUCOSE SERPL-MCNC: 60 MG/DL (ref 70–99)
HCT VFR BLD AUTO: 21.6 % (ref 35–47)
HGB BLD-MCNC: 7.4 G/DL (ref 11.7–15.7)
IMM GRANULOCYTES # BLD: ABNORMAL 10*3/UL
IMM GRANULOCYTES NFR BLD: ABNORMAL %
LYMPHOCYTES # BLD AUTO: ABNORMAL 10*3/UL
LYMPHOCYTES # BLD MANUAL: 2.3 10E3/UL (ref 0.8–5.3)
LYMPHOCYTES NFR BLD AUTO: ABNORMAL %
LYMPHOCYTES NFR BLD MANUAL: 15 %
MCH RBC QN AUTO: 31.1 PG (ref 26.5–33)
MCHC RBC AUTO-ENTMCNC: 34.3 G/DL (ref 31.5–36.5)
MCV RBC AUTO: 91 FL (ref 78–100)
MONOCYTES # BLD AUTO: ABNORMAL 10*3/UL
MONOCYTES # BLD MANUAL: 1.1 10E3/UL (ref 0–1.3)
MONOCYTES NFR BLD AUTO: ABNORMAL %
MONOCYTES NFR BLD MANUAL: 7 %
NEUTROPHILS # BLD AUTO: ABNORMAL 10*3/UL
NEUTROPHILS # BLD MANUAL: 9.9 10E3/UL (ref 1.6–8.3)
NEUTROPHILS NFR BLD AUTO: ABNORMAL %
NEUTROPHILS NFR BLD MANUAL: 65 %
NRBC # BLD AUTO: 0.2 10E3/UL
NRBC # BLD AUTO: 0.5 10E3/UL
NRBC BLD AUTO-RTO: 1 /100
NRBC BLD MANUAL-RTO: 3 %
PLAT MORPH BLD: ABNORMAL
PLATELET # BLD AUTO: 514 10E3/UL (ref 150–450)
POLYCHROMASIA BLD QL SMEAR: SLIGHT
POTASSIUM SERPL-SCNC: 2.3 MMOL/L (ref 3.4–5.3)
PROT SERPL-MCNC: 4.3 G/DL (ref 6.4–8.3)
RBC # BLD AUTO: 2.38 10E6/UL (ref 3.8–5.2)
RBC MORPH BLD: ABNORMAL
RETICS # AUTO: 0.52 10E6/UL (ref 0.03–0.1)
RETICS/RBC NFR AUTO: 22.1 % (ref 0.5–2)
SICKLE CELLS BLD QL SMEAR: SLIGHT
SODIUM SERPL-SCNC: 146 MMOL/L (ref 135–145)
WBC # BLD AUTO: 15.2 10E3/UL (ref 4–11)

## 2023-11-29 PROCEDURE — 86901 BLOOD TYPING SEROLOGIC RH(D): CPT | Performed by: REGISTERED NURSE

## 2023-11-29 PROCEDURE — 85045 AUTOMATED RETICULOCYTE COUNT: CPT | Performed by: EMERGENCY MEDICINE

## 2023-11-29 PROCEDURE — 250N000011 HC RX IP 250 OP 636: Performed by: EMERGENCY MEDICINE

## 2023-11-29 PROCEDURE — 99285 EMERGENCY DEPT VISIT HI MDM: CPT | Performed by: EMERGENCY MEDICINE

## 2023-11-29 PROCEDURE — 99151 MOD SED SAME PHYS/QHP <5 YRS: CPT

## 2023-11-29 PROCEDURE — 71046 X-RAY EXAM CHEST 2 VIEWS: CPT

## 2023-11-29 PROCEDURE — 36415 COLL VENOUS BLD VENIPUNCTURE: CPT | Performed by: REGISTERED NURSE

## 2023-11-29 PROCEDURE — 96376 TX/PRO/DX INJ SAME DRUG ADON: CPT

## 2023-11-29 PROCEDURE — 86923 COMPATIBILITY TEST ELECTRIC: CPT | Performed by: PEDIATRICS

## 2023-11-29 PROCEDURE — 85007 BL SMEAR W/DIFF WBC COUNT: CPT | Performed by: EMERGENCY MEDICINE

## 2023-11-29 PROCEDURE — 250N000009 HC RX 250: Performed by: EMERGENCY MEDICINE

## 2023-11-29 PROCEDURE — 80053 COMPREHEN METABOLIC PANEL: CPT | Performed by: EMERGENCY MEDICINE

## 2023-11-29 PROCEDURE — 96361 HYDRATE IV INFUSION ADD-ON: CPT

## 2023-11-29 PROCEDURE — 96374 THER/PROPH/DIAG INJ IV PUSH: CPT

## 2023-11-29 PROCEDURE — 258N000003 HC RX IP 258 OP 636: Performed by: EMERGENCY MEDICINE

## 2023-11-29 PROCEDURE — 85027 COMPLETE CBC AUTOMATED: CPT | Performed by: EMERGENCY MEDICINE

## 2023-11-29 PROCEDURE — 94640 AIRWAY INHALATION TREATMENT: CPT

## 2023-11-29 PROCEDURE — 36415 COLL VENOUS BLD VENIPUNCTURE: CPT | Performed by: EMERGENCY MEDICINE

## 2023-11-29 PROCEDURE — 99284 EMERGENCY DEPT VISIT MOD MDM: CPT | Mod: 25

## 2023-11-29 PROCEDURE — 96375 TX/PRO/DX INJ NEW DRUG ADDON: CPT

## 2023-11-29 RX ORDER — KETOROLAC TROMETHAMINE 30 MG/ML
30 INJECTION, SOLUTION INTRAMUSCULAR; INTRAVENOUS ONCE
Status: COMPLETED | OUTPATIENT
Start: 2023-11-29 | End: 2023-11-29

## 2023-11-29 RX ORDER — IPRATROPIUM BROMIDE AND ALBUTEROL SULFATE 2.5; .5 MG/3ML; MG/3ML
3 SOLUTION RESPIRATORY (INHALATION) ONCE
Status: COMPLETED | OUTPATIENT
Start: 2023-11-29 | End: 2023-11-29

## 2023-11-29 RX ORDER — ALBUTEROL SULFATE 0.83 MG/ML
2.5 SOLUTION RESPIRATORY (INHALATION) ONCE
Status: COMPLETED | OUTPATIENT
Start: 2023-11-29 | End: 2023-11-29

## 2023-11-29 RX ADMIN — KETOROLAC TROMETHAMINE 30 MG: 30 INJECTION, SOLUTION INTRAMUSCULAR; INTRAVENOUS at 22:09

## 2023-11-29 RX ADMIN — IPRATROPIUM BROMIDE AND ALBUTEROL SULFATE 3 ML: .5; 3 SOLUTION RESPIRATORY (INHALATION) at 22:02

## 2023-11-29 RX ADMIN — HYDROMORPHONE HYDROCHLORIDE 1 MG: 1 INJECTION, SOLUTION INTRAMUSCULAR; INTRAVENOUS; SUBCUTANEOUS at 22:04

## 2023-11-29 RX ADMIN — ALBUTEROL SULFATE 2.5 MG: 2.5 SOLUTION RESPIRATORY (INHALATION) at 22:41

## 2023-11-29 RX ADMIN — HYDROMORPHONE HYDROCHLORIDE 1 MG: 1 INJECTION, SOLUTION INTRAMUSCULAR; INTRAVENOUS; SUBCUTANEOUS at 23:36

## 2023-11-29 RX ADMIN — SODIUM CHLORIDE 1000 ML: 9 INJECTION, SOLUTION INTRAVENOUS at 22:01

## 2023-11-29 ASSESSMENT — ACTIVITIES OF DAILY LIVING (ADL): ADLS_ACUITY_SCORE: 35

## 2023-11-29 NOTE — PROGRESS NOTES
[facility]: Cancer Care Follow-Up Note                                    Discussion with Patient:                                                      Pt was notified that she would not be able to get blood transfusion today because the type and screen was not done yesterday.  Arrangements made today to go and get labs drawn and approval received from Aravind Horton in infusion that 1 unit of blood could be added onto her Jr on Friday (as well as pain meds).  Pt will attend appt Friday at 11 am.           Goals          General     Pain Management (pt-stated)      Notes - Note created  10/13/2023  3:51 PM by Arely Krueger, RN     Goal Statement: I will establish a plan for preventing and managing pain.  Date Goal set: 10/13/2023  Barriers: disease burden, multiple diagnoses, and transportation  Strengths: motivation, health awareness, and involvement with care team  Date to Achieve By: ongoing  Patient expressed understanding of goal: Yes  Action steps to achieve this goal:  I will take medications as prescribed.   I will call triage with new or worsening pain not controlled by medication.   I will use alternative therapies as directed. (Ice, heat, massage, etc)   I will establish care will palliative care department for ongoing pain management as needed.   I will call triage for medication refills when there are 2-3 days of medication remaining.                 Dates of Treatment:                                                      Infusion given in last 28 days       None            Assessment:                                                        Plan of Care Education Review:   Assessment completed with:: Patient    Plan of Care Education   Plan of Care:: Lab appointment;Treatment schedule  Procedure education provided for: : Blood product transfusion    Evaluation of Learning  Patient Education Provided: Yes  Readiness:: Acceptance  Method:: Explanation  Response:: Verbalizes understanding            Intervention/Education provided during outreach:                                                         Patient to follow up as scheduled at next appt  Patient to call/MyChart message with updates  Confirmed patient has clinic and triage numbers    Signature:  Arely Krueger RN

## 2023-11-30 VITALS
BODY MASS INDEX: 25.27 KG/M2 | TEMPERATURE: 98.1 F | OXYGEN SATURATION: 93 % | SYSTOLIC BLOOD PRESSURE: 109 MMHG | RESPIRATION RATE: 16 BRPM | DIASTOLIC BLOOD PRESSURE: 61 MMHG | WEIGHT: 148 LBS | HEIGHT: 64 IN | HEART RATE: 97 BPM

## 2023-11-30 LAB
ABO/RH(D): NORMAL
ANION GAP SERPL CALCULATED.3IONS-SCNC: 8 MMOL/L (ref 7–15)
ANTIBODY SCREEN: NEGATIVE
BLD PROD TYP BPU: NORMAL
BLD PROD TYP BPU: NORMAL
BLOOD COMPONENT TYPE: NORMAL
BLOOD COMPONENT TYPE: NORMAL
BUN SERPL-MCNC: 6.8 MG/DL (ref 6–20)
CALCIUM SERPL-MCNC: 8.3 MG/DL (ref 8.6–10)
CHLORIDE SERPL-SCNC: 105 MMOL/L (ref 98–107)
CODING SYSTEM: NORMAL
CODING SYSTEM: NORMAL
CREAT SERPL-MCNC: 0.48 MG/DL (ref 0.51–0.95)
CROSSMATCH: NORMAL
CROSSMATCH: NORMAL
DEPRECATED HCO3 PLAS-SCNC: 25 MMOL/L (ref 22–29)
EGFRCR SERPLBLD CKD-EPI 2021: >90 ML/MIN/1.73M2
GLUCOSE SERPL-MCNC: 116 MG/DL (ref 70–99)
ISSUE DATE AND TIME: NORMAL
POTASSIUM SERPL-SCNC: 3.1 MMOL/L (ref 3.4–5.3)
SODIUM SERPL-SCNC: 138 MMOL/L (ref 135–145)
SPECIMEN EXPIRATION DATE: NORMAL
UNIT ABO/RH: NORMAL
UNIT ABO/RH: NORMAL
UNIT NUMBER: NORMAL
UNIT NUMBER: NORMAL
UNIT STATUS: NORMAL
UNIT STATUS: NORMAL
UNIT TYPE ISBT: 9500
UNIT TYPE ISBT: 9500

## 2023-11-30 PROCEDURE — 250N000013 HC RX MED GY IP 250 OP 250 PS 637: Performed by: EMERGENCY MEDICINE

## 2023-11-30 PROCEDURE — 250N000011 HC RX IP 250 OP 636: Performed by: EMERGENCY MEDICINE

## 2023-11-30 PROCEDURE — 250N000011 HC RX IP 250 OP 636: Mod: JZ | Performed by: EMERGENCY MEDICINE

## 2023-11-30 PROCEDURE — 96375 TX/PRO/DX INJ NEW DRUG ADDON: CPT

## 2023-11-30 PROCEDURE — 96376 TX/PRO/DX INJ SAME DRUG ADON: CPT

## 2023-11-30 RX ORDER — HEPARIN SODIUM (PORCINE) LOCK FLUSH IV SOLN 100 UNIT/ML 100 UNIT/ML
5-10 SOLUTION INTRAVENOUS
Status: DISCONTINUED | OUTPATIENT
Start: 2023-11-30 | End: 2023-11-30 | Stop reason: HOSPADM

## 2023-11-30 RX ORDER — MEPERIDINE HYDROCHLORIDE 25 MG/ML
25 INJECTION INTRAMUSCULAR; INTRAVENOUS; SUBCUTANEOUS EVERY 30 MIN PRN
Status: CANCELLED | OUTPATIENT
Start: 2023-12-01

## 2023-11-30 RX ORDER — ALBUTEROL SULFATE 0.83 MG/ML
2.5 SOLUTION RESPIRATORY (INHALATION)
Status: CANCELLED | OUTPATIENT
Start: 2023-12-01

## 2023-11-30 RX ORDER — EPINEPHRINE 1 MG/ML
0.3 INJECTION, SOLUTION INTRAMUSCULAR; SUBCUTANEOUS EVERY 5 MIN PRN
Status: CANCELLED | OUTPATIENT
Start: 2023-12-01

## 2023-11-30 RX ORDER — HEPARIN SODIUM,PORCINE 10 UNIT/ML
5 VIAL (ML) INTRAVENOUS
Status: CANCELLED | OUTPATIENT
Start: 2023-11-30

## 2023-11-30 RX ORDER — HEPARIN SODIUM,PORCINE 10 UNIT/ML
5 VIAL (ML) INTRAVENOUS
Status: CANCELLED | OUTPATIENT
Start: 2023-12-01

## 2023-11-30 RX ORDER — HEPARIN SODIUM (PORCINE) LOCK FLUSH IV SOLN 100 UNIT/ML 100 UNIT/ML
5 SOLUTION INTRAVENOUS
Status: CANCELLED | OUTPATIENT
Start: 2023-11-30

## 2023-11-30 RX ORDER — HEPARIN SODIUM (PORCINE) LOCK FLUSH IV SOLN 100 UNIT/ML 100 UNIT/ML
5 SOLUTION INTRAVENOUS
Status: CANCELLED | OUTPATIENT
Start: 2023-12-01

## 2023-11-30 RX ORDER — METHYLPREDNISOLONE SODIUM SUCCINATE 125 MG/2ML
125 INJECTION, POWDER, LYOPHILIZED, FOR SOLUTION INTRAMUSCULAR; INTRAVENOUS
Status: CANCELLED
Start: 2023-12-01

## 2023-11-30 RX ORDER — ALBUTEROL SULFATE 90 UG/1
1-2 AEROSOL, METERED RESPIRATORY (INHALATION)
Status: CANCELLED
Start: 2023-12-01

## 2023-11-30 RX ORDER — ONDANSETRON 2 MG/ML
4 INJECTION INTRAMUSCULAR; INTRAVENOUS ONCE
Status: COMPLETED | OUTPATIENT
Start: 2023-11-30 | End: 2023-11-30

## 2023-11-30 RX ORDER — POTASSIUM CHLORIDE 1.5 G/1.58G
20 POWDER, FOR SOLUTION ORAL ONCE
Status: COMPLETED | OUTPATIENT
Start: 2023-11-30 | End: 2023-11-30

## 2023-11-30 RX ORDER — DIPHENHYDRAMINE HYDROCHLORIDE 50 MG/ML
50 INJECTION INTRAMUSCULAR; INTRAVENOUS
Status: CANCELLED
Start: 2023-12-01

## 2023-11-30 RX ADMIN — HYDROMORPHONE HYDROCHLORIDE 1 MG: 1 INJECTION, SOLUTION INTRAMUSCULAR; INTRAVENOUS; SUBCUTANEOUS at 02:03

## 2023-11-30 RX ADMIN — POTASSIUM CHLORIDE 20 MEQ: 1.5 POWDER, FOR SOLUTION ORAL at 00:32

## 2023-11-30 RX ADMIN — HEPARIN 5 ML: 100 SYRINGE at 03:35

## 2023-11-30 RX ADMIN — ONDANSETRON 4 MG: 2 INJECTION INTRAMUSCULAR; INTRAVENOUS at 02:04

## 2023-11-30 ASSESSMENT — ACTIVITIES OF DAILY LIVING (ADL)
ADLS_ACUITY_SCORE: 35
ADLS_ACUITY_SCORE: 35

## 2023-11-30 NOTE — ED NOTES
The patient was accepted at shift change signout with a plan for me to follow-up with her regarding how she was feeling after she got her medication doses, per her care plan.  Was also noted that her potassium was slightly low, this was replaced.  Following her medication administration per her pain management plan, she states she is feeling much improved.  She feels she is ready to discharge.  She will be discharged home at this time.  She is encouraged to continue her normal medications, follow-up with her outpatient provider, return with any concerns.  She verbalizes understanding and is agreeable to the plan.    Dictation Disclaimer: Some of this Note has been completed with voice-recognition dictation software. Although errors are generally corrected real-time, there is the potential for a rare error to be present in the completed chart.       Court Ring MD  11/30/23 [id number]

## 2023-11-30 NOTE — ED PROVIDER NOTES
ED Provider Note  [facility]      History     Chief Complaint   Patient presents with    Sickle Cell Pain Crisis    Asthma Exacerbation     HPI  Jennifer Cervantes is a 23 year old female with a history of asthma, sickle cell disease with subsequent acute chest syndrome episodes and stroke with residual right-sided hemiplegia who presents with shortness of breath. The patient attributes this to her asthma and says it does not feel like acute chest syndrome, which she has had in the past. The patient reports shortness of breath beginning two days ago when she went back to work as a . She thinks that the increased movement has been making her breathing worse. Today, she became significantly more short of breath. She took her at home asthma medications including nebulizers, inhalers, pain medications, ibuprofen, and showered which together, provided minimal relief of symptoms. She endorses some chest tightness. She denies chest pain, dizziness or lightheadedness. Denies recent sick contacts.     Past Medical History  Past Medical History:   Diagnosis Date    Anxiety     Bleeding disorder (H24)     Blood clotting disorder (H24)     Cerebral infarction (H) 2015    Cognitive developmental delay     low IQ. 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 side (H)     right hand contractures    Iron overload due to repeated red blood cell transfusions     Migraines     Multiple subsegmental pulmonary emboli without acute cor pulmonale (H) 02/01/2021    Oppositional defiant behavior     Presence of intrauterine contraceptive device 2/18/2020    Superficial venous thrombosis of arm, right 03/25/2021    Uncomplicated asthma      Past Surgical History:   Procedure Laterality Date    AS INSERT TUNNELED CV 2 CATH W/O PORT/PUMP      CHOLECYSTECTOMY      CV RIGHT HEART CATH  MEASUREMENTS RECORDED N/A 11/18/2021    Procedure: Right Heart Cath;  Surgeon: Jackson Stauffer MD;  Location:  HEART CARDIAC CATH LAB    INSERT PORT VASCULAR ACCESS Left 4/21/2021    Procedure: INSERTION, VASCULAR ACCESS PORT (NOT SURE ON SIDE UNTIL REMOVAL);  Surgeon: Rajan More MD;  Location: [facility] OR    IR CHEST PORT PLACEMENT > 5 YRS OF AGE  4/21/2021    IR CVC NON TUNNEL LINE REMOVAL  5/6/2021    IR CVC NON TUNNEL PLACEMENT > 5 YRS  04/07/2020    IR CVC NON TUNNEL PLACEMENT > 5 YRS  4/30/2021    IR CVC NON TUNNEL PLACEMENT > 5 YRS  9/7/2022    IR PORT REMOVAL LEFT  4/21/2021    REMOVE PORT VASCULAR ACCESS Left 4/21/2021    Procedure: REMOVAL, VASCULAR ACCESS PORT LEFT;  Surgeon: Rajan More MD;  Location: [facility] OR    REPAIR TENDON ELBOW Right 10/02/2019    Procedure: Right Elbow Flexor Lengthening, Flexor Pronator Slide Of Wrist and Finger, Thumb Adductor Lengthening;  Surgeon: Anai Franco MD;  Location: [facility] OR    TONSILLECTOMY Bilateral 10/02/2019    Procedure: Bilateral Tonsillectomy;  Surgeon: Farhana Guy MD;  Location: [facility] OR    ZZC BREAST REDUCTION (INCLUDES LIPO) TIER 3 Bilateral 04/23/2019     acetaminophen (TYLENOL) 325 MG tablet  albuterol (PROAIR HFA/PROVENTIL HFA/VENTOLIN HFA) 108 (90 Base) MCG/ACT inhaler  albuterol (PROVENTIL) (2.5 MG/3ML) 0.083% neb solution  aspirin (ASA) 81 MG chewable tablet  budesonide-formoterol (SYMBICORT) 160-4.5 MCG/ACT Inhaler  cetirizine (ZYRTEC) 10 MG tablet  deferasirox (JADENU) 360 MG tablet  diphenhydrAMINE (BENADRYL) 25 MG capsule  Hydroxyurea 1000 MG TABS  methocarbamol (ROBAXIN) 750 MG tablet  naloxone (NARCAN) 4 MG/0.1ML nasal spray  ondansetron (ZOFRAN) 8 MG tablet  oxyCODONE IR (ROXICODONE) 10 MG tablet  EPINEPHrine (ANY BX GENERIC EQUIV) 0.3 MG/0.3ML injection 2-pack      Allergies   Allergen Reactions    Contrast Dye      Hives and breathing issues    Fish-Derived Products Hives    Seafood Hives    Adhesive Tape Hives      "Primipore dressing causes hives    Gadolinium     Iodinated Contrast Media      Family History  Family History   Problem Relation Age of Onset    Sickle Cell Trait Mother     Hypertension Mother     Asthma Mother     Sickle Cell Trait Father     Glaucoma No family hx of     Macular Degeneration No family hx of     Diabetes No family hx of     Gout No family hx of      Social History   Social History     Tobacco Use    Smoking status: Never     Passive exposure: Never    Smokeless tobacco: Never   Substance Use Topics    Alcohol use: Not Currently     Alcohol/week: 0.0 standard drinks of alcohol    Drug use: Never         A medically appropriate review of systems was performed with pertinent positives and negatives noted in the HPI, and all other systems negative.    Physical Exam   BP: 128/83  Pulse: 97  Temp: 97.3  F (36.3  C)  Resp: 18  Height: 162.6 cm (5' 4\")  Weight: 67.1 kg (148 lb)  SpO2: 97 %  Physical Exam  Constitutional:   well nourished, well developed, resting comfortably   HEENT:   Head: Normocephalic and atraumatic.   Eyes: Conjunctivae are normal. Pupils are equal, round, and reactive to light.   Throat: Pharynx has no erythema or exudate, mucous membranes are moist  Cardiovascular: regular rate and rhythm without murmurs or gallops  Pulmonary/Chest: Wheezing in the right greater than left lung fields.   Musculoskeletal:  no edema  Skin: Skin is warm and dry. No rash noted.   Neurological: alert and oriented to person, place, and time. Nonfocal exam  Psychiatric:  normal mood and affect.      ED Course, Procedures, & Data      Procedures                      Results for orders placed or performed during the hospital encounter of 11/29/23   XR Chest 2 Views     Status: None    Narrative    EXAM: XR CHEST 2 VIEWS  LOCATION: [facility]  DATE: 11/29/2023    INDICATION: soa  COMPARISON: 11/21/2023      Impression    IMPRESSION: The implanted port is again seen " overlying the left chest wall, unchanged from prior exam. No acute cardiopulmonary process identified.   Comprehensive metabolic panel     Status: Abnormal   Result Value Ref Range    Sodium 146 (H) 135 - 145 mmol/L    Potassium 2.3 (LL) 3.4 - 5.3 mmol/L    Carbon Dioxide (CO2) 16 (L) 22 - 29 mmol/L    Anion Gap 8 7 - 15 mmol/L    Urea Nitrogen 4.5 (L) 6.0 - 20.0 mg/dL    Creatinine 0.29 (L) 0.51 - 0.95 mg/dL    GFR Estimate >90 >60 mL/min/1.73m2    Calcium 4.9 (LL) 8.6 - 10.0 mg/dL    Chloride 122 (H) 98 - 107 mmol/L    Glucose 60 (L) 70 - 99 mg/dL    Alkaline Phosphatase 39 (L) 40 - 150 U/L    AST 28 0 - 45 U/L    ALT 12 0 - 50 U/L    Protein Total 4.3 (L) 6.4 - 8.3 g/dL    Albumin 2.5 (L) 3.5 - 5.2 g/dL    Bilirubin Total 1.8 (H) <=1.2 mg/dL   Reticulocyte count     Status: Abnormal   Result Value Ref Range    % Reticulocyte 22.1 (H) 0.5 - 2.0 %    Absolute Reticulocyte 0.522 (H) 0.025 - 0.095 10e6/uL   CBC with platelets and differential     Status: Abnormal   Result Value Ref Range    WBC Count 15.2 (H) 4.0 - 11.0 10e3/uL    RBC Count 2.38 (L) 3.80 - 5.20 10e6/uL    Hemoglobin 7.4 (L) 11.7 - 15.7 g/dL    Hematocrit 21.6 (L) 35.0 - 47.0 %    MCV 91 78 - 100 fL    MCH 31.1 26.5 - 33.0 pg    MCHC 34.3 31.5 - 36.5 g/dL    RDW 20.2 (H) 10.0 - 15.0 %    Platelet Count 514 (H) 150 - 450 10e3/uL    % Neutrophils      % Lymphocytes      % Monocytes      % Eosinophils      % Basophils      % Immature Granulocytes      NRBCs per 100 WBC 1 (H) <1 /100    Absolute Neutrophils      Absolute Lymphocytes      Absolute Monocytes      Absolute Eosinophils      Absolute Basophils      Absolute Immature Granulocytes      Absolute NRBCs 0.2 10e3/uL   Manual Differential     Status: Abnormal   Result Value Ref Range    % Neutrophils 65 %    % Lymphocytes 15 %    % Monocytes 7 %    % Eosinophils 10 %    % Basophils 3 %    NRBCs per 100 WBC 3 (H) <=0 %    Absolute Neutrophils 9.9 (H) 1.6 - 8.3 10e3/uL    Absolute Lymphocytes 2.3 0.8 -  5.3 10e3/uL    Absolute Monocytes 1.1 0.0 - 1.3 10e3/uL    Absolute Eosinophils 1.5 (H) 0.0 - 0.7 10e3/uL    Absolute Basophils 0.5 (H) 0.0 - 0.2 10e3/uL    Absolute NRBCs 0.5 (H) <=0.0 10e3/uL    RBC Morphology Confirmed RBC Indices     Platelet Assessment  Automated Count Confirmed. Platelet morphology is normal.     Automated Count Confirmed. Platelet morphology is normal.    Polychromasia Slight (A) None Seen    Sickle Cells Slight (A) None Seen   Basic metabolic panel     Status: Abnormal   Result Value Ref Range    Sodium 138 135 - 145 mmol/L    Potassium 3.1 (L) 3.4 - 5.3 mmol/L    Chloride 105 98 - 107 mmol/L    Carbon Dioxide (CO2) 25 22 - 29 mmol/L    Anion Gap 8 7 - 15 mmol/L    Urea Nitrogen 6.8 6.0 - 20.0 mg/dL    Creatinine 0.48 (L) 0.51 - 0.95 mg/dL    GFR Estimate >90 >60 mL/min/1.73m2    Calcium 8.3 (L) 8.6 - 10.0 mg/dL    Glucose 116 (H) 70 - 99 mg/dL   CBC with platelets differential     Status: Abnormal    Narrative    The following orders were created for panel order CBC with platelets differential.  Procedure                               Abnormality         Status                     ---------                               -----------         ------                     CBC with platelets and d...[[id number]]  Abnormal            Final result               Manual Differential[[id number]]          Abnormal            Final result                 Please view results for these tests on the individual orders.     Medications   sodium chloride 0.9% BOLUS 1,000 mL (0 mLs Intravenous Stopped 11/30/23 0031)   HYDROmorphone (DILAUDID) injection 1 mg (1 mg Intravenous $Given 11/30/23 0203)   ketorolac (TORADOL) injection 30 mg (30 mg Intravenous $Given 11/29/23 2209)   ipratropium - albuterol 0.5 mg/2.5 mg/3 mL (DUONEB) neb solution 3 mL (3 mLs Nebulization $Given 11/29/23 2202)   albuterol (PROVENTIL) neb solution 2.5 mg (2.5 mg Nebulization $Given 11/29/23 2241)   potassium chloride (KLOR-CON) Packet 20  mEq (20 mEq Oral $Given 11/30/23 0032)   ondansetron (ZOFRAN) injection 4 mg (4 mg Intravenous $Given 11/30/23 0204)     Labs Ordered and Resulted from Time of ED Arrival to Time of ED Departure   COMPREHENSIVE METABOLIC PANEL - Abnormal       Result Value    Sodium 146 (*)     Potassium 2.3 (*)     Carbon Dioxide (CO2) 16 (*)     Anion Gap 8      Urea Nitrogen 4.5 (*)     Creatinine 0.29 (*)     GFR Estimate >90      Calcium 4.9 (*)     Chloride 122 (*)     Glucose 60 (*)     Alkaline Phosphatase 39 (*)     AST 28      ALT 12      Protein Total 4.3 (*)     Albumin 2.5 (*)     Bilirubin Total 1.8 (*)    RETICULOCYTE COUNT - Abnormal    % Reticulocyte 22.1 (*)     Absolute Reticulocyte 0.522 (*)    CBC WITH PLATELETS AND DIFFERENTIAL - Abnormal    WBC Count 15.2 (*)     RBC Count 2.38 (*)     Hemoglobin 7.4 (*)     Hematocrit 21.6 (*)     MCV 91      MCH 31.1      MCHC 34.3      RDW 20.2 (*)     Platelet Count 514 (*)     % Neutrophils        % Lymphocytes        % Monocytes        % Eosinophils        % Basophils        % Immature Granulocytes        NRBCs per 100 WBC 1 (*)     Absolute Neutrophils        Absolute Lymphocytes        Absolute Monocytes        Absolute Eosinophils        Absolute Basophils        Absolute Immature Granulocytes        Absolute NRBCs 0.2     DIFFERENTIAL - Abnormal    % Neutrophils 65      % Lymphocytes 15      % Monocytes 7      % Eosinophils 10      % Basophils 3      NRBCs per 100 WBC 3 (*)     Absolute Neutrophils 9.9 (*)     Absolute Lymphocytes 2.3      Absolute Monocytes 1.1      Absolute Eosinophils 1.5 (*)     Absolute Basophils 0.5 (*)     Absolute NRBCs 0.5 (*)     RBC Morphology Confirmed RBC Indices      Platelet Assessment        Value: Automated Count Confirmed. Platelet morphology is normal.    Polychromasia Slight (*)     Sickle Cells Slight (*)    BASIC METABOLIC PANEL - Abnormal    Sodium 138      Potassium 3.1 (*)     Chloride 105      Carbon Dioxide (CO2) 25       Anion Gap 8      Urea Nitrogen 6.8      Creatinine 0.48 (*)     GFR Estimate >90      Calcium 8.3 (*)     Glucose 116 (*)      XR Chest 2 Views   Final Result   IMPRESSION: The implanted port is again seen overlying the left chest wall, unchanged from prior exam. No acute cardiopulmonary process identified.                 Assessment & Plan    Jennifer Cervantes is a 23 year old female with a history of asthma, sickle cell disease with subsequent acute chest syndrome episodes and stroke with residual right-sided hemiplegia who presents with shortness of breath. The patient attributes this to her asthma and says it does not feel like acute chest syndrome, which she has had in the past. Vitals were reassuring. She is afebrile. Exam was notable for wheezing in the right greater than left lung fields.     Differential diagnosis included asthma exacerbation versus URI versus acute chest syndrome. The patient states that she feels like this is related to her asthma. She has not had any recent sick contacts and is afebrile, so URI is less likely. Chest XR was ordered to rule out acute chest syndrome. XR was unchanged from previous imaging and no acute cardiopulmonary process was identified.    Lab workup included CBC, which was unremarkable and near her baseline. CMP was redrawn due to suspected lab error. Redraw showed no significant electrolyte abnormalities. Retic count was expectedly elevated at 22.1.     The patient has an existing pain plan for sickle cell disease, so this was initiated here in the ED as well as symptomatic treatment for her shortness of breath including albuterol nebulizer and ipratropium.     --    ED Attending Physician Attestation    I Isael Cruz MD, cared for this patient with the Medical Student. I performed, or re-performed, the physical exam and medical decision-making. I have verified the accuracy of all the medical student findings and documentation above, and have edited as  necessary.    Summary of HPI, PE, ED Course   Patient is a 23 year old female evaluated in the emergency department for sickle cell pain and asthma exacerbation. Exam and ED course notable for patient does have some wheezing with expiration but does not appear in acute respiratory distress.  She is afebrile and is not hypoxic.  Chest x-ray does not demonstrate findings suspicious for acute chest syndrome.  Initial metabolic panel showed multiple electrolyte abnormalities which raise suspicion for contamination from indwelling flush from her port so this was repeated and results appeared more normal indicating probable contamination in the first specimen.  Other blood work demonstrates somewhat elevated white blood cell count as well as reticulocyte count increasing suspicion for acute sickle cell pain crisis.  She was provided beta agonist treatment as well as pain medication as per her care plan and was signed out to the oncoming physician to reevaluate the patient with plan for probable discharge.        Isael Cruz MD  Emergency Medicine      I have reviewed the nursing notes. I have reviewed the findings, diagnosis, plan and need for follow up with the patient.    Discharge Medication List as of 11/30/2023  2:36 AM          Final diagnoses:   Sickle cell pain crisis (H)   Mild asthma with exacerbation, unspecified whether persistent     I was present with the medical student, Ember Box, MS4, who contributed to the documentation as above. I agree with the assessment and plan documented in today's note.      Isael Cruz  [facility] EMERGENCY DEPARTMENT  11/29/2023     Isael Cruz MD  12/01/23 0022

## 2023-11-30 NOTE — DISCHARGE INSTRUCTIONS
Continue your outpatient medications. Follow up with your clinic provider. Return with any worsening or concerns.

## 2023-11-30 NOTE — ED TRIAGE NOTES
Pt.  States feeling wheezy using inhalers without relief.  Also c/o of lower back pain, due to sickle cell and taking oxycodone at home no relief.   Triage Assessment (Adult)       Row Name 11/29/23 9984          Triage Assessment    Airway WDL WDL        Respiratory WDL    Respiratory WDL WDL        Skin Circulation/Temperature WDL    Skin Circulation/Temperature WDL WDL        Cardiac WDL    Cardiac WDL WDL        Peripheral/Neurovascular WDL    Peripheral Neurovascular WDL WDL        Cognitive/Neuro/Behavioral WDL    Cognitive/Neuro/Behavioral WDL WDL

## 2023-12-01 ENCOUNTER — APPOINTMENT (OUTPATIENT)
Dept: LAB | Facility: CLINIC | Age: 23
End: 2023-12-01
Attending: REGISTERED NURSE
Payer: COMMERCIAL

## 2023-12-01 ENCOUNTER — INFUSION THERAPY VISIT (OUTPATIENT)
Dept: ONCOLOGY | Facility: CLINIC | Age: 23
End: 2023-12-01
Attending: REGISTERED NURSE
Payer: COMMERCIAL

## 2023-12-01 VITALS
BODY MASS INDEX: 25.3 KG/M2 | RESPIRATION RATE: 16 BRPM | TEMPERATURE: 98.6 F | OXYGEN SATURATION: 97 % | WEIGHT: 147.4 LBS | SYSTOLIC BLOOD PRESSURE: 101 MMHG | DIASTOLIC BLOOD PRESSURE: 63 MMHG | HEART RATE: 80 BPM

## 2023-12-01 DIAGNOSIS — Z86.73 HISTORY OF STROKE: ICD-10-CM

## 2023-12-01 DIAGNOSIS — D57.00 SICKLE CELL PAIN CRISIS (H): Primary | ICD-10-CM

## 2023-12-01 DIAGNOSIS — G81.10 SPASTIC HEMIPLEGIA, UNSPECIFIED ETIOLOGY, UNSPECIFIED LATERALITY (H): ICD-10-CM

## 2023-12-01 DIAGNOSIS — D57.1 HB-SS DISEASE WITHOUT CRISIS (H): ICD-10-CM

## 2023-12-01 LAB
ANION GAP SERPL CALCULATED.3IONS-SCNC: 8 MMOL/L (ref 7–15)
BASOPHILS # BLD AUTO: 0.3 10E3/UL (ref 0–0.2)
BASOPHILS NFR BLD AUTO: 2 %
BUN SERPL-MCNC: 3.9 MG/DL (ref 6–20)
CALCIUM SERPL-MCNC: 9 MG/DL (ref 8.6–10)
CHLORIDE SERPL-SCNC: 107 MMOL/L (ref 98–107)
CREAT SERPL-MCNC: 0.49 MG/DL (ref 0.51–0.95)
DEPRECATED HCO3 PLAS-SCNC: 24 MMOL/L (ref 22–29)
EGFRCR SERPLBLD CKD-EPI 2021: >90 ML/MIN/1.73M2
EOSINOPHIL # BLD AUTO: 0.8 10E3/UL (ref 0–0.7)
EOSINOPHIL NFR BLD AUTO: 6 %
ERYTHROCYTE [DISTWIDTH] IN BLOOD BY AUTOMATED COUNT: 21.5 % (ref 10–15)
GLUCOSE SERPL-MCNC: 89 MG/DL (ref 70–99)
HCT VFR BLD AUTO: 21.2 % (ref 35–47)
HGB BLD-MCNC: 7.3 G/DL (ref 11.7–15.7)
IMM GRANULOCYTES # BLD: 0.1 10E3/UL
IMM GRANULOCYTES NFR BLD: 1 %
LYMPHOCYTES # BLD AUTO: 1.8 10E3/UL (ref 0.8–5.3)
LYMPHOCYTES NFR BLD AUTO: 14 %
MCH RBC QN AUTO: 31.5 PG (ref 26.5–33)
MCHC RBC AUTO-ENTMCNC: 34.4 G/DL (ref 31.5–36.5)
MCV RBC AUTO: 91 FL (ref 78–100)
MONOCYTES # BLD AUTO: 0.8 10E3/UL (ref 0–1.3)
MONOCYTES NFR BLD AUTO: 6 %
NEUTROPHILS # BLD AUTO: 9.3 10E3/UL (ref 1.6–8.3)
NEUTROPHILS NFR BLD AUTO: 71 %
NRBC # BLD AUTO: 0.2 10E3/UL
NRBC BLD AUTO-RTO: 2 /100
PLATELET # BLD AUTO: 515 10E3/UL (ref 150–450)
POTASSIUM SERPL-SCNC: 3.9 MMOL/L (ref 3.4–5.3)
RBC # BLD AUTO: 2.32 10E6/UL (ref 3.8–5.2)
RETICS # AUTO: 0.57 10E6/UL (ref 0.03–0.1)
RETICS/RBC NFR AUTO: 23.2 % (ref 0.5–2)
SODIUM SERPL-SCNC: 139 MMOL/L (ref 135–145)
WBC # BLD AUTO: 13 10E3/UL (ref 4–11)

## 2023-12-01 PROCEDURE — 85025 COMPLETE CBC W/AUTO DIFF WBC: CPT | Performed by: PEDIATRICS

## 2023-12-01 PROCEDURE — 86850 RBC ANTIBODY SCREEN: CPT | Performed by: PEDIATRICS

## 2023-12-01 PROCEDURE — 258N000003 HC RX IP 258 OP 636: Performed by: PEDIATRICS

## 2023-12-01 PROCEDURE — 36430 TRANSFUSION BLD/BLD COMPNT: CPT

## 2023-12-01 PROCEDURE — 250N000011 HC RX IP 250 OP 636: Mod: JZ | Performed by: REGISTERED NURSE

## 2023-12-01 PROCEDURE — 96365 THER/PROPH/DIAG IV INF INIT: CPT

## 2023-12-01 PROCEDURE — 85045 AUTOMATED RETICULOCYTE COUNT: CPT | Performed by: PEDIATRICS

## 2023-12-01 PROCEDURE — 250N000013 HC RX MED GY IP 250 OP 250 PS 637: Performed by: PEDIATRICS

## 2023-12-01 PROCEDURE — 96375 TX/PRO/DX INJ NEW DRUG ADDON: CPT

## 2023-12-01 PROCEDURE — 86901 BLOOD TYPING SEROLOGIC RH(D): CPT | Performed by: PEDIATRICS

## 2023-12-01 PROCEDURE — 82374 ASSAY BLOOD CARBON DIOXIDE: CPT | Performed by: PEDIATRICS

## 2023-12-01 PROCEDURE — 96413 CHEMO IV INFUSION 1 HR: CPT

## 2023-12-01 PROCEDURE — 96361 HYDRATE IV INFUSION ADD-ON: CPT

## 2023-12-01 PROCEDURE — 36591 DRAW BLOOD OFF VENOUS DEVICE: CPT | Performed by: PEDIATRICS

## 2023-12-01 PROCEDURE — P9016 RBC LEUKOCYTES REDUCED: HCPCS | Performed by: PEDIATRICS

## 2023-12-01 PROCEDURE — 96376 TX/PRO/DX INJ SAME DRUG ADON: CPT

## 2023-12-01 PROCEDURE — 250N000011 HC RX IP 250 OP 636: Performed by: PEDIATRICS

## 2023-12-01 RX ORDER — DIPHENHYDRAMINE HCL 25 MG
25 CAPSULE ORAL
Status: CANCELLED
Start: 2024-01-01

## 2023-12-01 RX ORDER — KETOROLAC TROMETHAMINE 30 MG/ML
30 INJECTION, SOLUTION INTRAMUSCULAR; INTRAVENOUS ONCE
Status: CANCELLED
Start: 2024-01-01 | End: 2024-01-01

## 2023-12-01 RX ORDER — HEPARIN SODIUM,PORCINE 10 UNIT/ML
5 VIAL (ML) INTRAVENOUS
Status: CANCELLED | OUTPATIENT
Start: 2024-01-01

## 2023-12-01 RX ORDER — ONDANSETRON 4 MG/1
8 TABLET, FILM COATED ORAL
Status: CANCELLED
Start: 2024-01-01

## 2023-12-01 RX ORDER — HEPARIN SODIUM (PORCINE) LOCK FLUSH IV SOLN 100 UNIT/ML 100 UNIT/ML
5 SOLUTION INTRAVENOUS ONCE
Status: COMPLETED | OUTPATIENT
Start: 2023-12-01 | End: 2023-12-01

## 2023-12-01 RX ORDER — DIPHENHYDRAMINE HCL 25 MG
25 CAPSULE ORAL
Status: COMPLETED | OUTPATIENT
Start: 2023-12-01 | End: 2023-12-01

## 2023-12-01 RX ORDER — KETOROLAC TROMETHAMINE 30 MG/ML
30 INJECTION, SOLUTION INTRAMUSCULAR; INTRAVENOUS ONCE
Status: COMPLETED | OUTPATIENT
Start: 2023-12-01 | End: 2023-12-01

## 2023-12-01 RX ORDER — ONDANSETRON 8 MG/1
8 TABLET, ORALLY DISINTEGRATING ORAL
Status: COMPLETED | OUTPATIENT
Start: 2023-12-01 | End: 2023-12-01

## 2023-12-01 RX ORDER — HEPARIN SODIUM (PORCINE) LOCK FLUSH IV SOLN 100 UNIT/ML 100 UNIT/ML
5 SOLUTION INTRAVENOUS
Status: DISCONTINUED | OUTPATIENT
Start: 2023-12-01 | End: 2023-12-01 | Stop reason: HOSPADM

## 2023-12-01 RX ORDER — HEPARIN SODIUM (PORCINE) LOCK FLUSH IV SOLN 100 UNIT/ML 100 UNIT/ML
5 SOLUTION INTRAVENOUS
Status: CANCELLED | OUTPATIENT
Start: 2024-01-01

## 2023-12-01 RX ADMIN — KETOROLAC TROMETHAMINE 30 MG: 30 INJECTION, SOLUTION INTRAMUSCULAR; INTRAVENOUS at 11:22

## 2023-12-01 RX ADMIN — HYDROMORPHONE HYDROCHLORIDE 1 MG: 1 INJECTION, SOLUTION INTRAMUSCULAR; INTRAVENOUS; SUBCUTANEOUS at 13:45

## 2023-12-01 RX ADMIN — HYDROMORPHONE HYDROCHLORIDE 1 MG: 1 INJECTION, SOLUTION INTRAMUSCULAR; INTRAVENOUS; SUBCUTANEOUS at 12:28

## 2023-12-01 RX ADMIN — Medication 5 ML: at 16:46

## 2023-12-01 RX ADMIN — SODIUM CHLORIDE, POTASSIUM CHLORIDE, SODIUM LACTATE AND CALCIUM CHLORIDE 1000 ML: 600; 310; 30; 20 INJECTION, SOLUTION INTRAVENOUS at 15:04

## 2023-12-01 RX ADMIN — SODIUM CHLORIDE 250 ML: 9 INJECTION, SOLUTION INTRAVENOUS at 11:22

## 2023-12-01 RX ADMIN — Medication 5 ML: at 10:45

## 2023-12-01 RX ADMIN — HYDROMORPHONE HYDROCHLORIDE 1 MG: 1 INJECTION, SOLUTION INTRAMUSCULAR; INTRAVENOUS; SUBCUTANEOUS at 11:26

## 2023-12-01 RX ADMIN — DIPHENHYDRAMINE HYDROCHLORIDE 25 MG: 25 CAPSULE ORAL at 11:22

## 2023-12-01 RX ADMIN — SODIUM CHLORIDE 334.5 MG: 9 INJECTION, SOLUTION INTRAVENOUS at 11:40

## 2023-12-01 RX ADMIN — ONDANSETRON 8 MG: 8 TABLET, ORALLY DISINTEGRATING ORAL at 11:37

## 2023-12-01 ASSESSMENT — PAIN SCALES - GENERAL: PAINLEVEL: EXTREME PAIN (8)

## 2023-12-01 NOTE — PATIENT INSTRUCTIONS
[facility] Triage and after hours / weekends / holidays:  [phone]    Please call the triage or after hours line if you experience a temperature greater than or equal to 100.4, shaking chills, have uncontrolled nausea, vomiting and/or diarrhea, dizziness, shortness of breath, chest pain, bleeding, unexplained bruising, or if you have any other new/concerning symptoms, questions or concerns.      If you are having any concerning symptoms or wish to speak to a provider before your next infusion visit, please call your care coordinator or triage to notify them so we can adequately serve you.     If you need a refill on a narcotic prescription or other medication, please call before your infusion appointment.

## 2023-12-01 NOTE — PROGRESS NOTES
Infusion Nursing Note:  Jennifer Cervantes presents today for 1 unit PRBCs, crizanlizumab, IV fluids, pain medications.    Patient seen by provider today: No   present during visit today: Not Applicable.    Note: Jennifer presents today feeling okay. Endorses 8/10 lower back pain, typical of her sickle cell pain. Last took her home medications ~0500 this morning with some relief. The colder weather has been more difficult for her pain management. Denies nausea/vomiting. Denies fevers/chills. Reports she was in the ED recently for asthma flare, which she attributes to her need for blood transfusion. She has been using her inhalers and neb treatments at home with some relief. Endorses intermittent wheezing that resolves with the use of her inhalers/nebs, denies feeling rattling or congestion in her lungs. Cough is nonproductive and dry. Offers no other concerns at today's visit. Upon assessment, no noted wheezing or work of breathing, O2 sats 93% on RA.    Per written communication with Patricia Mantilla CNP/Gretchen Cruz RN [phone]  Hgb is better, give 1 unit blood or okay to skip if Jennifer is feeling better with her breathing    Discussed with Jennifer; she would like to receive 1 unit of blood today.    Jennifer reports pain as 4/10 at time of discharge. She feels comfortable going home to further manage pain. She has a ride available to bring her home.      Intravenous Access:  Implanted Port.    Treatment Conditions:     Latest Reference Range & Units 12/01/23 10:46   Sodium 135 - 145 mmol/L 139   Potassium 3.4 - 5.3 mmol/L 3.9   Chloride 98 - 107 mmol/L 107   Carbon Dioxide (CO2) 22 - 29 mmol/L 24   Urea Nitrogen 6.0 - 20.0 mg/dL 3.9 (L)   Creatinine 0.51 - 0.95 mg/dL 0.49 (L)   GFR Estimate >60 mL/min/1.73m2 >90   Calcium 8.6 - 10.0 mg/dL 9.0   Anion Gap 7 - 15 mmol/L 8   Glucose 70 - 99 mg/dL 89   WBC 4.0 - 11.0 10e3/uL 13.0 (H)   Hemoglobin 11.7 - 15.7 g/dL 7.3 (L)   Hematocrit 35.0 - 47.0 % 21.2 (L)   Platelet  Count 150 - 450 10e3/uL 515 (H)   RBC Count 3.80 - 5.20 10e6/uL 2.32 (L)   MCV 78 - 100 fL 91   MCH 26.5 - 33.0 pg 31.5   MCHC 31.5 - 36.5 g/dL 34.4   RDW 10.0 - 15.0 % 21.5 (H)   % Neutrophils % 71   % Lymphocytes % 14   % Monocytes % 6   % Eosinophils % 6   % Basophils % 2   Absolute Basophils 0.0 - 0.2 10e3/uL 0.3 (H)   Absolute Eosinophils 0.0 - 0.7 10e3/uL 0.8 (H)   Absolute Immature Granulocytes <=0.4 10e3/uL 0.1   Absolute Lymphocytes 0.8 - 5.3 10e3/uL 1.8   Absolute Monocytes 0.0 - 1.3 10e3/uL 0.8   % Immature Granulocytes % 1   Absolute Neutrophils 1.6 - 8.3 10e3/uL 9.3 (H)   Absolute NRBCs 10e3/uL 0.2   NRBCs per 100 WBC <1 /100 2 (H)   % Retic 0.5 - 2.0 % 23.2 (H)   Absolute Retic 0.025 - 0.095 10e6/uL 0.572 (H)     Results reviewed, labs did not meet treatment parameters: Hgb >7, see TORB above.  Blood transfusion consent signed 08/18/23.      Post Infusion Assessment:  Patient tolerated infusion without incident.  Patient observed for 30 minutes post crizanlizumab per protocol.  Blood return noted pre and post infusion.  Site patent and intact, free from redness, edema or discomfort.  No evidence of extravasations.  Access discontinued per protocol.       Discharge Plan:   Patient declined prescription refills.  Discharge instructions reviewed with: Patient.  Patient and/or family verbalized understanding of discharge instructions and all questions answered.  AVS to patient via [employer].  Patient will return 12/28 for next infusion appointment.   Patient discharged in stable condition accompanied by: self.  Departure Mode: Ambulatory.      Gretchen Cruz RN

## 2023-12-01 NOTE — NURSING NOTE
Chief Complaint   Patient presents with    Port Draw     Labs drawn via port by lab RN. VS taken.     Labs drawn via port by RN. Port accessed with 20g flat needle. Flushed with saline and heparin. Pt tolerated well. Vitals taken. Pt checked into next appt.      Cinthia Mane RN

## 2023-12-03 ENCOUNTER — HOSPITAL ENCOUNTER (EMERGENCY)
Facility: CLINIC | Age: 23
Discharge: HOME | End: 2023-12-03
Attending: EMERGENCY MEDICINE | Admitting: EMERGENCY MEDICINE
Payer: COMMERCIAL

## 2023-12-03 VITALS
DIASTOLIC BLOOD PRESSURE: 89 MMHG | OXYGEN SATURATION: 97 % | RESPIRATION RATE: 16 BRPM | HEART RATE: 61 BPM | SYSTOLIC BLOOD PRESSURE: 133 MMHG | TEMPERATURE: 98.2 F

## 2023-12-03 DIAGNOSIS — D57.00 SICKLE CELL PAIN CRISIS (H): ICD-10-CM

## 2023-12-03 LAB
ALBUMIN SERPL BCG-MCNC: 4.5 G/DL (ref 3.5–5.2)
ALP SERPL-CCNC: 67 U/L (ref 40–150)
ALT SERPL W P-5'-P-CCNC: 21 U/L (ref 0–50)
ANION GAP SERPL CALCULATED.3IONS-SCNC: 10 MMOL/L (ref 7–15)
AST SERPL W P-5'-P-CCNC: 35 U/L (ref 0–45)
BASOPHILS # BLD AUTO: 0.2 10E3/UL (ref 0–0.2)
BASOPHILS NFR BLD AUTO: 2 %
BILIRUB SERPL-MCNC: 2.7 MG/DL
BUN SERPL-MCNC: 12.4 MG/DL (ref 6–20)
CALCIUM SERPL-MCNC: 8.9 MG/DL (ref 8.6–10)
CHLORIDE SERPL-SCNC: 104 MMOL/L (ref 98–107)
CREAT SERPL-MCNC: 0.58 MG/DL (ref 0.51–0.95)
DEPRECATED HCO3 PLAS-SCNC: 25 MMOL/L (ref 22–29)
EGFRCR SERPLBLD CKD-EPI 2021: >90 ML/MIN/1.73M2
EOSINOPHIL # BLD AUTO: 0.7 10E3/UL (ref 0–0.7)
EOSINOPHIL NFR BLD AUTO: 6 %
ERYTHROCYTE [DISTWIDTH] IN BLOOD BY AUTOMATED COUNT: 22 % (ref 10–15)
GLUCOSE SERPL-MCNC: 91 MG/DL (ref 70–99)
HCG SERPL QL: NEGATIVE
HCT VFR BLD AUTO: 24.6 % (ref 35–47)
HGB BLD-MCNC: 8.5 G/DL (ref 11.7–15.7)
IMM GRANULOCYTES # BLD: 0 10E3/UL
IMM GRANULOCYTES NFR BLD: 0 %
LYMPHOCYTES # BLD AUTO: 2.1 10E3/UL (ref 0.8–5.3)
LYMPHOCYTES NFR BLD AUTO: 17 %
MCH RBC QN AUTO: 31 PG (ref 26.5–33)
MCHC RBC AUTO-ENTMCNC: 34.6 G/DL (ref 31.5–36.5)
MCV RBC AUTO: 90 FL (ref 78–100)
MONOCYTES # BLD AUTO: 0.8 10E3/UL (ref 0–1.3)
MONOCYTES NFR BLD AUTO: 7 %
NEUTROPHILS # BLD AUTO: 8.2 10E3/UL (ref 1.6–8.3)
NEUTROPHILS NFR BLD AUTO: 68 %
NRBC # BLD AUTO: 0.2 10E3/UL
NRBC BLD AUTO-RTO: 2 /100
PLATELET # BLD AUTO: 488 10E3/UL (ref 150–450)
POTASSIUM SERPL-SCNC: 4.4 MMOL/L (ref 3.4–5.3)
PROT SERPL-MCNC: 7.4 G/DL (ref 6.4–8.3)
RBC # BLD AUTO: 2.74 10E6/UL (ref 3.8–5.2)
RETICS # AUTO: 0.45 10E6/UL (ref 0.03–0.1)
RETICS/RBC NFR AUTO: 16.2 % (ref 0.5–2)
SODIUM SERPL-SCNC: 139 MMOL/L (ref 135–145)
WBC # BLD AUTO: 12 10E3/UL (ref 4–11)

## 2023-12-03 PROCEDURE — 96361 HYDRATE IV INFUSION ADD-ON: CPT | Performed by: EMERGENCY MEDICINE

## 2023-12-03 PROCEDURE — 80053 COMPREHEN METABOLIC PANEL: CPT | Performed by: EMERGENCY MEDICINE

## 2023-12-03 PROCEDURE — 96376 TX/PRO/DX INJ SAME DRUG ADON: CPT | Performed by: EMERGENCY MEDICINE

## 2023-12-03 PROCEDURE — 36415 COLL VENOUS BLD VENIPUNCTURE: CPT | Performed by: EMERGENCY MEDICINE

## 2023-12-03 PROCEDURE — 99284 EMERGENCY DEPT VISIT MOD MDM: CPT | Performed by: EMERGENCY MEDICINE

## 2023-12-03 PROCEDURE — 96375 TX/PRO/DX INJ NEW DRUG ADDON: CPT | Performed by: EMERGENCY MEDICINE

## 2023-12-03 PROCEDURE — 250N000011 HC RX IP 250 OP 636: Performed by: EMERGENCY MEDICINE

## 2023-12-03 PROCEDURE — 99284 EMERGENCY DEPT VISIT MOD MDM: CPT | Mod: 25 | Performed by: EMERGENCY MEDICINE

## 2023-12-03 PROCEDURE — 85025 COMPLETE CBC W/AUTO DIFF WBC: CPT | Performed by: EMERGENCY MEDICINE

## 2023-12-03 PROCEDURE — 84703 CHORIONIC GONADOTROPIN ASSAY: CPT | Performed by: EMERGENCY MEDICINE

## 2023-12-03 PROCEDURE — 96374 THER/PROPH/DIAG INJ IV PUSH: CPT | Performed by: EMERGENCY MEDICINE

## 2023-12-03 PROCEDURE — 250N000011 HC RX IP 250 OP 636: Mod: JZ | Performed by: FAMILY MEDICINE

## 2023-12-03 PROCEDURE — 258N000003 HC RX IP 258 OP 636: Performed by: EMERGENCY MEDICINE

## 2023-12-03 PROCEDURE — 85045 AUTOMATED RETICULOCYTE COUNT: CPT | Performed by: EMERGENCY MEDICINE

## 2023-12-03 RX ORDER — KETOROLAC TROMETHAMINE 30 MG/ML
30 INJECTION, SOLUTION INTRAMUSCULAR; INTRAVENOUS ONCE
Status: COMPLETED | OUTPATIENT
Start: 2023-12-03 | End: 2023-12-03

## 2023-12-03 RX ORDER — HEPARIN SODIUM (PORCINE) LOCK FLUSH IV SOLN 100 UNIT/ML 100 UNIT/ML
5-10 SOLUTION INTRAVENOUS
Status: DISCONTINUED | OUTPATIENT
Start: 2023-12-03 | End: 2023-12-03 | Stop reason: HOSPADM

## 2023-12-03 RX ADMIN — HEPARIN 5 ML: 100 SYRINGE at 11:07

## 2023-12-03 RX ADMIN — KETOROLAC TROMETHAMINE 30 MG: 30 INJECTION, SOLUTION INTRAMUSCULAR; INTRAVENOUS at 06:02

## 2023-12-03 RX ADMIN — SODIUM CHLORIDE, POTASSIUM CHLORIDE, SODIUM LACTATE AND CALCIUM CHLORIDE 1000 ML: 600; 310; 30; 20 INJECTION, SOLUTION INTRAVENOUS at 06:02

## 2023-12-03 RX ADMIN — HYDROMORPHONE HYDROCHLORIDE 1 MG: 1 INJECTION, SOLUTION INTRAMUSCULAR; INTRAVENOUS; SUBCUTANEOUS at 09:44

## 2023-12-03 RX ADMIN — HYDROMORPHONE HYDROCHLORIDE 1 MG: 1 INJECTION, SOLUTION INTRAMUSCULAR; INTRAVENOUS; SUBCUTANEOUS at 06:02

## 2023-12-03 RX ADMIN — HYDROMORPHONE HYDROCHLORIDE 1 MG: 1 INJECTION, SOLUTION INTRAMUSCULAR; INTRAVENOUS; SUBCUTANEOUS at 07:23

## 2023-12-03 ASSESSMENT — ACTIVITIES OF DAILY LIVING (ADL)
ADLS_ACUITY_SCORE: 36
ADLS_ACUITY_SCORE: 33
ADLS_ACUITY_SCORE: 36
ADLS_ACUITY_SCORE: 36

## 2023-12-03 NOTE — ED TRIAGE NOTES
Pt reports having a blood transfusion on Friday and develop pain in her arm, chest,that worsen since Friday night     Triage Assessment (Adult)       Row Name 12/03/23 0443          Triage Assessment    Airway WDL WDL        Respiratory WDL    Respiratory WDL WDL        Skin Circulation/Temperature WDL    Skin Circulation/Temperature WDL WDL        Cardiac WDL    Cardiac WDL WDL        Peripheral/Neurovascular WDL    Peripheral Neurovascular WDL WDL

## 2023-12-03 NOTE — ED PROVIDER NOTES
Emergency Department Patient Sign-out       Brief HPI:  This is a 23 year old female signed out to me by Dr. Ring .  See initial ED Provider note for details of the presentation.            Significant Events prior to my assuming care: Patient with a history of sickle cell disease, prior complications and also multiple previous episodes of sickle cell pain crisis.  Was seen and evaluated completely on the shift prior, and is being treated symptomatically.  No reported evidence of trauma, vascular compromise infectious etiology or other life-threatening etiology of her exacerbation of pain.  She is being administered medications per the protocol which is outlined for her in the chart when she presents in this fashion.      Exam:   Patient Vitals for the past 24 hrs:   BP Temp Temp src Pulse Resp SpO2   12/03/23 0723 117/59 -- -- 77 16 95 %   12/03/23 0442 115/77 98.2  F (36.8  C) Oral 87 18 95 %     General: Patient is in no acute distress and is resting comfortably.  HEENT: Normocephalic atraumatic  Neck: Supple  Cardiovascular: Heart rate normal  Pulmonary: Patient is in no respiratory distress  Extremities: No signs of any significant or life-threatening trauma.  Neurologic: No new focal neurologic deficits.        ED RESULTS:   Results for orders placed or performed during the hospital encounter of 12/03/23 (from the past 24 hour(s))   CBC with platelets differential     Status: Abnormal    Collection Time: 12/03/23  5:50 AM    Narrative    The following orders were created for panel order CBC with platelets differential.  Procedure                               Abnormality         Status                     ---------                               -----------         ------                     CBC with platelets and d...[[id number]]  Abnormal            Final result                 Please view results for these tests on the individual orders.   Comprehensive metabolic panel     Status: Abnormal     Collection Time: 12/03/23  5:50 AM   Result Value Ref Range    Sodium 139 135 - 145 mmol/L    Potassium 4.4 3.4 - 5.3 mmol/L    Carbon Dioxide (CO2) 25 22 - 29 mmol/L    Anion Gap 10 7 - 15 mmol/L    Urea Nitrogen 12.4 6.0 - 20.0 mg/dL    Creatinine 0.58 0.51 - 0.95 mg/dL    GFR Estimate >90 >60 mL/min/1.73m2    Calcium 8.9 8.6 - 10.0 mg/dL    Chloride 104 98 - 107 mmol/L    Glucose 91 70 - 99 mg/dL    Alkaline Phosphatase 67 40 - 150 U/L    AST 35 0 - 45 U/L    ALT 21 0 - 50 U/L    Protein Total 7.4 6.4 - 8.3 g/dL    Albumin 4.5 3.5 - 5.2 g/dL    Bilirubin Total 2.7 (H) <=1.2 mg/dL   Reticulocyte count     Status: Abnormal    Collection Time: 12/03/23  5:50 AM   Result Value Ref Range    % Reticulocyte 16.2 (H) 0.5 - 2.0 %    Absolute Reticulocyte 0.445 (H) 0.025 - 0.095 10e6/uL   HCG qualitative pregnancy (blood)     Status: Normal    Collection Time: 12/03/23  5:50 AM   Result Value Ref Range    hCG Serum Qualitative Negative Negative   CBC with platelets and differential     Status: Abnormal    Collection Time: 12/03/23  5:50 AM   Result Value Ref Range    WBC Count 12.0 (H) 4.0 - 11.0 10e3/uL    RBC Count 2.74 (L) 3.80 - 5.20 10e6/uL    Hemoglobin 8.5 (L) 11.7 - 15.7 g/dL    Hematocrit 24.6 (L) 35.0 - 47.0 %    MCV 90 78 - 100 fL    MCH 31.0 26.5 - 33.0 pg    MCHC 34.6 31.5 - 36.5 g/dL    RDW 22.0 (H) 10.0 - 15.0 %    Platelet Count 488 (H) 150 - 450 10e3/uL    % Neutrophils 68 %    % Lymphocytes 17 %    % Monocytes 7 %    % Eosinophils 6 %    % Basophils 2 %    % Immature Granulocytes 0 %    NRBCs per 100 WBC 2 (H) <1 /100    Absolute Neutrophils 8.2 1.6 - 8.3 10e3/uL    Absolute Lymphocytes 2.1 0.8 - 5.3 10e3/uL    Absolute Monocytes 0.8 0.0 - 1.3 10e3/uL    Absolute Eosinophils 0.7 0.0 - 0.7 10e3/uL    Absolute Basophils 0.2 0.0 - 0.2 10e3/uL    Absolute Immature Granulocytes 0.0 <=0.4 10e3/uL    Absolute NRBCs 0.2 10e3/uL       ED MEDICATIONS:   Medications   HYDROmorphone (DILAUDID) injection 1 mg (1 mg  Intravenous $Given 12/3/23 0723)   HYDROmorphone (DILAUDID) injection 1 mg (1 mg Intravenous $Given 12/3/23 0602)   ketorolac (TORADOL) injection 30 mg (30 mg Intravenous $Given 12/3/23 0602)   lactated ringers BOLUS 1,000 mL (0 mLs Intravenous Stopped 12/3/23 0724)         Impression:    ICD-10-CM    1. Sickle cell pain crisis (H)  D57.00           Plan:    Pending studies include none.  Patient is being treated per the protocol for pain crisis which is outlined in her chart.  Plan to reassess later on the shift to see if she is improving.    Patient now stating she is feeling better and requesting discharge.  Appears improved/stable.  States she has appropriate follow-up in place.  We discussed the indications for emergency department return and follow-up.  Stable for discharge.      MD Courtney Morrison David, MD  12/03/23 1046

## 2023-12-03 NOTE — ED PROVIDER NOTES
ED Provider Note  [facility]      History     Chief Complaint   Patient presents with    Sickle Cell Pain Crisis     HPI  Jennifer Cervantes is a 23 year old female  with a history of asthma, sickle cell disease with subsequent acute chest syndrome episodes and stroke with residual right-sided hemiplegia who presents to the ED with complaint of bilateral arm pain since Friday, 2 days ago, which she states is consistent with a sickle cell crisis.  She states the pain is in the entire arm on both sides.  No new numbness, tingling, weakness.  No trauma.  No fever, cough, shortness of breath, abdominal pain, nausea, vomiting, diarrhea.  Was noted in the triage notes that she has chest pain, but she states she does not have chest pain, and has not had chest pain with this crisis.  She states that she had a transfusion of red cells on Friday, and she thinks that is what triggered the crisis.  She states that she used all of her medications at home, used warm compresses, taking hot baths, and symptoms seem to be getting worse instead of better, which led to her presentation here.    Past Medical History  Past Medical History:   Diagnosis Date    Anxiety     Bleeding disorder (H24)     Blood clotting disorder (H24)     Cerebral infarction (H) 2015    Cognitive developmental delay     low IQ. 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 side (H)     right hand contractures    Iron overload due to repeated red blood cell transfusions     Migraines     Multiple subsegmental pulmonary emboli without acute cor pulmonale (H) 02/01/2021    Oppositional defiant behavior     Presence of intrauterine contraceptive device 2/18/2020    Superficial venous thrombosis of arm, right 03/25/2021    Uncomplicated asthma      Past Surgical History:   Procedure Laterality Date    AS INSERT TUNNELED CV  2 CATH W/O PORT/PUMP      CHOLECYSTECTOMY      CV RIGHT HEART CATH MEASUREMENTS RECORDED N/A 11/18/2021    Procedure: Right Heart Cath;  Surgeon: Jackson Stauffer MD;  [facility]    INSERT PORT VASCULAR ACCESS Left 4/21/2021    Procedure: INSERTION, VASCULAR ACCESS PORT (NOT SURE ON SIDE UNTIL REMOVAL);  Surgeon: Rajan More MD;  Location: [facility] OR    IR CHEST PORT PLACEMENT > 5 YRS OF AGE  4/21/2021    IR CVC NON TUNNEL LINE REMOVAL  5/6/2021    IR CVC NON TUNNEL PLACEMENT > 5 YRS  04/07/2020    IR CVC NON TUNNEL PLACEMENT > 5 YRS  4/30/2021    IR CVC NON TUNNEL PLACEMENT > 5 YRS  9/7/2022    IR PORT REMOVAL LEFT  4/21/2021    REMOVE PORT VASCULAR ACCESS Left 4/21/2021    Procedure: REMOVAL, VASCULAR ACCESS PORT LEFT;  Surgeon: Rajan More MD;  Location: [facility] OR    REPAIR TENDON ELBOW Right 10/02/2019    Procedure: Right Elbow Flexor Lengthening, Flexor Pronator Slide Of Wrist and Finger, Thumb Adductor Lengthening;  Surgeon: Anai Franco MD;  Location: [facility]    TONSILLECTOMY Bilateral 10/02/2019    Procedure: Bilateral Tonsillectomy;  Surgeon: Farhana Guy MD;  Location: [facility]    ZZC BREAST REDUCTION (INCLUDES LIPO) TIER 3 Bilateral 04/23/2019     acetaminophen (TYLENOL) 325 MG tablet  albuterol (PROAIR HFA/PROVENTIL HFA/VENTOLIN HFA) 108 (90 Base) MCG/ACT inhaler  albuterol (PROVENTIL) (2.5 MG/3ML) 0.083% neb solution  aspirin (ASA) 81 MG chewable tablet  budesonide-formoterol (SYMBICORT) 160-4.5 MCG/ACT Inhaler  cetirizine (ZYRTEC) 10 MG tablet  deferasirox (JADENU) 360 MG tablet  diphenhydrAMINE (BENADRYL) 25 MG capsule  EPINEPHrine (ANY BX GENERIC EQUIV) 0.3 MG/0.3ML injection 2-pack  Hydroxyurea 1000 MG TABS  methocarbamol (ROBAXIN) 750 MG tablet  naloxone (NARCAN) 4 MG/0.1ML nasal spray  ondansetron (ZOFRAN) 8 MG tablet  oxyCODONE IR (ROXICODONE) 10 MG tablet      Allergies   Allergen Reactions    Contrast Dye      Hives and breathing issues     Fish-Derived Products Hives    Seafood Hives    Adhesive Tape Hives     Primipore dressing causes hives    Gadolinium     Iodinated Contrast Media      Family History  Family History   Problem Relation Age of Onset    Sickle Cell Trait Mother     Hypertension Mother     Asthma Mother     Sickle Cell Trait Father     Glaucoma No family hx of     Macular Degeneration No family hx of     Diabetes No family hx of     Gout No family hx of      Social History   Social History     Tobacco Use    Smoking status: Never     Passive exposure: Never    Smokeless tobacco: Never   Substance Use Topics    Alcohol use: Not Currently     Alcohol/week: 0.0 standard drinks of alcohol    Drug use: Never         A medically appropriate review of systems was performed with pertinent positives and negatives noted in the HPI, and all other systems negative.    Physical Exam   BP: 115/77  Pulse: 87  Temp: 98.2  F (36.8  C)  Resp: 18  SpO2: 95 %  Physical Exam  Constitutional:       General: She is not in acute distress.     Appearance: Normal appearance. She is not toxic-appearing.   HENT:      Head: Atraumatic.      Mouth/Throat:      Mouth: Mucous membranes are moist.   Eyes:      General: No scleral icterus.     Conjunctiva/sclera: Conjunctivae normal.   Cardiovascular:      Rate and Rhythm: Normal rate.      Heart sounds: Normal heart sounds.   Pulmonary:      Effort: No respiratory distress.      Breath sounds: Normal breath sounds.   Abdominal:      Palpations: Abdomen is soft.      Tenderness: There is no abdominal tenderness.   Musculoskeletal:         General: No deformity.      Cervical back: Neck supple.      Comments: Spastic paresis of the RUE, non-tender, good distal pulse. Left arm mildly to moderately tender with palpation. No rash or erythema noted. No deformity. ROM intact. CMS intact   Skin:     General: Skin is warm.      Findings: No rash.   Neurological:      Mental Status: She is alert.           ED Course,  Procedures, & Data      Procedures                     Results for orders placed or performed during the hospital encounter of 12/03/23   Comprehensive metabolic panel     Status: Abnormal   Result Value Ref Range    Sodium 139 135 - 145 mmol/L    Potassium 4.4 3.4 - 5.3 mmol/L    Carbon Dioxide (CO2) 25 22 - 29 mmol/L    Anion Gap 10 7 - 15 mmol/L    Urea Nitrogen 12.4 6.0 - 20.0 mg/dL    Creatinine 0.58 0.51 - 0.95 mg/dL    GFR Estimate >90 >60 mL/min/1.73m2    Calcium 8.9 8.6 - 10.0 mg/dL    Chloride 104 98 - 107 mmol/L    Glucose 91 70 - 99 mg/dL    Alkaline Phosphatase 67 40 - 150 U/L    AST 35 0 - 45 U/L    ALT 21 0 - 50 U/L    Protein Total 7.4 6.4 - 8.3 g/dL    Albumin 4.5 3.5 - 5.2 g/dL    Bilirubin Total 2.7 (H) <=1.2 mg/dL   Reticulocyte count     Status: Abnormal   Result Value Ref Range    % Reticulocyte 16.2 (H) 0.5 - 2.0 %    Absolute Reticulocyte 0.445 (H) 0.025 - 0.095 10e6/uL   HCG qualitative pregnancy (blood)     Status: Normal   Result Value Ref Range    hCG Serum Qualitative Negative Negative   CBC with platelets and differential     Status: Abnormal   Result Value Ref Range    WBC Count 12.0 (H) 4.0 - 11.0 10e3/uL    RBC Count 2.74 (L) 3.80 - 5.20 10e6/uL    Hemoglobin 8.5 (L) 11.7 - 15.7 g/dL    Hematocrit 24.6 (L) 35.0 - 47.0 %    MCV 90 78 - 100 fL    MCH 31.0 26.5 - 33.0 pg    MCHC 34.6 31.5 - 36.5 g/dL    RDW 22.0 (H) 10.0 - 15.0 %    Platelet Count 488 (H) 150 - 450 10e3/uL    % Neutrophils 68 %    % Lymphocytes 17 %    % Monocytes 7 %    % Eosinophils 6 %    % Basophils 2 %    % Immature Granulocytes 0 %    NRBCs per 100 WBC 2 (H) <1 /100    Absolute Neutrophils 8.2 1.6 - 8.3 10e3/uL    Absolute Lymphocytes 2.1 0.8 - 5.3 10e3/uL    Absolute Monocytes 0.8 0.0 - 1.3 10e3/uL    Absolute Eosinophils 0.7 0.0 - 0.7 10e3/uL    Absolute Basophils 0.2 0.0 - 0.2 10e3/uL    Absolute Immature Granulocytes 0.0 <=0.4 10e3/uL    Absolute NRBCs 0.2 10e3/uL   CBC with platelets differential     Status:  Abnormal    Narrative    The following orders were created for panel order CBC with platelets differential.  Procedure                               Abnormality         Status                     ---------                               -----------         ------                     CBC with platelets and d...[[id number]]  Abnormal            Final result                 Please view results for these tests on the individual orders.     Medications   HYDROmorphone (DILAUDID) injection 1 mg (has no administration in time range)   HYDROmorphone (DILAUDID) injection 1 mg (1 mg Intravenous $Given 12/3/23 0602)   ketorolac (TORADOL) injection 30 mg (30 mg Intravenous $Given 12/3/23 0602)   lactated ringers BOLUS 1,000 mL (1,000 mLs Intravenous $New Bag 12/3/23 0602)     Labs Ordered and Resulted from Time of ED Arrival to Time of ED Departure   COMPREHENSIVE METABOLIC PANEL - Abnormal       Result Value    Sodium 139      Potassium 4.4      Carbon Dioxide (CO2) 25      Anion Gap 10      Urea Nitrogen 12.4      Creatinine 0.58      GFR Estimate >90      Calcium 8.9      Chloride 104      Glucose 91      Alkaline Phosphatase 67      AST 35      ALT 21      Protein Total 7.4      Albumin 4.5      Bilirubin Total 2.7 (*)    RETICULOCYTE COUNT - Abnormal    % Reticulocyte 16.2 (*)     Absolute Reticulocyte 0.445 (*)    CBC WITH PLATELETS AND DIFFERENTIAL - Abnormal    WBC Count 12.0 (*)     RBC Count 2.74 (*)     Hemoglobin 8.5 (*)     Hematocrit 24.6 (*)     MCV 90      MCH 31.0      MCHC 34.6      RDW 22.0 (*)     Platelet Count 488 (*)     % Neutrophils 68      % Lymphocytes 17      % Monocytes 7      % Eosinophils 6      % Basophils 2      % Immature Granulocytes 0      NRBCs per 100 WBC 2 (*)     Absolute Neutrophils 8.2      Absolute Lymphocytes 2.1      Absolute Monocytes 0.8      Absolute Eosinophils 0.7      Absolute Basophils 0.2      Absolute Immature Granulocytes 0.0      Absolute NRBCs 0.2     HCG QUALITATIVE  PREGNANCY - Normal    hCG Serum Qualitative Negative       No orders to display          Critical care was not performed.     Medical Decision Making  The patient's presentation was of moderate complexity (a chronic illness mild to moderate exacerbation, progression, or side effect of treatment).    The patient's evaluation involved:  review of external note(s) from 2 sources (previous  notes)  review of 3+ test result(s) ordered prior to this encounter (previous results)  ordering and/or review of 3+ test(s) in this encounter (see separate area of note for details)    The patient's management necessitated high risk (a parenteral controlled substance).    Assessment & Plan    Patient states that her symptoms are consistent with previous episodes of sickle cell pain crisis.  No trauma, no sign of vascular compromise, no evidence of infectious etiology.  Basic labs are fairly stable with her baseline.  Her protocol meds were ordered.  She is signed out at shift change pending repeat evaluation after her meds are all in to see how she is feeling.    Dictation Disclaimer: Some of this Note has been completed with voice-recognition dictation software. Although errors are generally corrected real-time, there is the potential for a rare error to be present in the completed chart.      I have reviewed the nursing notes. I have reviewed the findings, diagnosis, plan and need for follow up with the patient.    New Prescriptions    No medications on file       Final diagnoses:   Sickle cell pain crisis (H)       Court Ring  [facility] EMERGENCY DEPARTMENT  12/3/2023     Court Ring MD  12/03/23 0712

## 2023-12-03 NOTE — DISCHARGE INSTRUCTIONS
Continue with your normal medications. Follow up with your outpatient provider next week. Return with any worsening or concerns.

## 2023-12-04 ENCOUNTER — INFUSION THERAPY VISIT (OUTPATIENT)
Dept: TRANSPLANT | Facility: CLINIC | Age: 23
End: 2023-12-04
Attending: PEDIATRICS
Payer: COMMERCIAL

## 2023-12-04 ENCOUNTER — PATIENT OUTREACH (OUTPATIENT)
Dept: CARE COORDINATION | Facility: CLINIC | Age: 23
End: 2023-12-04
Payer: COMMERCIAL

## 2023-12-04 ENCOUNTER — NURSE TRIAGE (OUTPATIENT)
Dept: ONCOLOGY | Facility: CLINIC | Age: 23
End: 2023-12-04
Payer: COMMERCIAL

## 2023-12-04 VITALS
OXYGEN SATURATION: 99 % | HEART RATE: 89 BPM | DIASTOLIC BLOOD PRESSURE: 76 MMHG | TEMPERATURE: 98 F | SYSTOLIC BLOOD PRESSURE: 123 MMHG | RESPIRATION RATE: 18 BRPM

## 2023-12-04 DIAGNOSIS — D57.00 SICKLE CELL PAIN CRISIS (H): Primary | ICD-10-CM

## 2023-12-04 DIAGNOSIS — G81.10 SPASTIC HEMIPLEGIA, UNSPECIFIED ETIOLOGY, UNSPECIFIED LATERALITY (H): ICD-10-CM

## 2023-12-04 PROCEDURE — 96375 TX/PRO/DX INJ NEW DRUG ADDON: CPT

## 2023-12-04 PROCEDURE — 96374 THER/PROPH/DIAG INJ IV PUSH: CPT

## 2023-12-04 PROCEDURE — 96361 HYDRATE IV INFUSION ADD-ON: CPT

## 2023-12-04 PROCEDURE — 250N000013 HC RX MED GY IP 250 OP 250 PS 637: Performed by: PEDIATRICS

## 2023-12-04 PROCEDURE — 250N000011 HC RX IP 250 OP 636: Performed by: PEDIATRICS

## 2023-12-04 PROCEDURE — 258N000003 HC RX IP 258 OP 636: Performed by: PEDIATRICS

## 2023-12-04 PROCEDURE — 96376 TX/PRO/DX INJ SAME DRUG ADON: CPT

## 2023-12-04 RX ORDER — HEPARIN SODIUM (PORCINE) LOCK FLUSH IV SOLN 100 UNIT/ML 100 UNIT/ML
5 SOLUTION INTRAVENOUS
Status: CANCELLED | OUTPATIENT
Start: 2024-01-01

## 2023-12-04 RX ORDER — ONDANSETRON 8 MG/1
8 TABLET, ORALLY DISINTEGRATING ORAL
Status: COMPLETED | OUTPATIENT
Start: 2023-12-04 | End: 2023-12-04

## 2023-12-04 RX ORDER — HEPARIN SODIUM (PORCINE) LOCK FLUSH IV SOLN 100 UNIT/ML 100 UNIT/ML
5 SOLUTION INTRAVENOUS ONCE
Status: COMPLETED | OUTPATIENT
Start: 2023-12-04 | End: 2023-12-04

## 2023-12-04 RX ORDER — DIPHENHYDRAMINE HCL 25 MG
25 CAPSULE ORAL
Status: CANCELLED
Start: 2024-01-01

## 2023-12-04 RX ORDER — ONDANSETRON 4 MG/1
8 TABLET, FILM COATED ORAL
Status: CANCELLED
Start: 2024-01-01

## 2023-12-04 RX ORDER — HEPARIN SODIUM,PORCINE 10 UNIT/ML
5 VIAL (ML) INTRAVENOUS
Status: CANCELLED | OUTPATIENT
Start: 2024-01-01

## 2023-12-04 RX ORDER — KETOROLAC TROMETHAMINE 30 MG/ML
30 INJECTION, SOLUTION INTRAMUSCULAR; INTRAVENOUS ONCE
Status: COMPLETED | OUTPATIENT
Start: 2023-12-04 | End: 2023-12-04

## 2023-12-04 RX ORDER — DIPHENHYDRAMINE HCL 25 MG
25 CAPSULE ORAL
Status: COMPLETED | OUTPATIENT
Start: 2023-12-04 | End: 2023-12-04

## 2023-12-04 RX ORDER — KETOROLAC TROMETHAMINE 30 MG/ML
30 INJECTION, SOLUTION INTRAMUSCULAR; INTRAVENOUS ONCE
Status: CANCELLED
Start: 2024-01-01 | End: 2024-01-01

## 2023-12-04 RX ADMIN — KETOROLAC TROMETHAMINE 30 MG: 30 INJECTION, SOLUTION INTRAMUSCULAR; INTRAVENOUS at 11:02

## 2023-12-04 RX ADMIN — DIPHENHYDRAMINE HYDROCHLORIDE 25 MG: 25 CAPSULE ORAL at 11:02

## 2023-12-04 RX ADMIN — HYDROMORPHONE HYDROCHLORIDE 1 MG: 1 INJECTION, SOLUTION INTRAMUSCULAR; INTRAVENOUS; SUBCUTANEOUS at 13:21

## 2023-12-04 RX ADMIN — ONDANSETRON 8 MG: 8 TABLET, ORALLY DISINTEGRATING ORAL at 11:02

## 2023-12-04 RX ADMIN — HYDROMORPHONE HYDROCHLORIDE 1 MG: 1 INJECTION, SOLUTION INTRAMUSCULAR; INTRAVENOUS; SUBCUTANEOUS at 11:59

## 2023-12-04 RX ADMIN — SODIUM CHLORIDE, POTASSIUM CHLORIDE, SODIUM LACTATE AND CALCIUM CHLORIDE 1000 ML: 600; 310; 30; 20 INJECTION, SOLUTION INTRAVENOUS at 11:01

## 2023-12-04 RX ADMIN — HYDROMORPHONE HYDROCHLORIDE 1 MG: 1 INJECTION, SOLUTION INTRAMUSCULAR; INTRAVENOUS; SUBCUTANEOUS at 11:00

## 2023-12-04 RX ADMIN — Medication 5 ML: at 15:40

## 2023-12-04 ASSESSMENT — PAIN SCALES - GENERAL: PAINLEVEL: EXTREME PAIN (9)

## 2023-12-04 NOTE — TELEPHONE ENCOUNTER
Oncology Nurse Triage - Sickle Cell Pain Crisis:  Situation: Jennifer  calling about Sickle Cell Pain Crisis, requesting to be added on for IV fluids and pain medicine    Background:   Patient's last infusion was 12/3/23 in ED  Last clinic visit date:11/27/23 w/ Patricia Mantilla  Does patient have active treatment plan?  Yes    Assessment of Symptoms:  Onset/Duration of symptoms: 4 day    Is it typical sickle cell pain? Yes   Location: bilateral arms  Character: Sharp           Intensity: 9/10    Any radiation of pain, numbness, tingling, weakness, warmth, swelling, discoloration of arms or legs?Yes -pain in arms only    Fever?No    Chest Pain Present: No     Shortness of breath: No     Other home therapies tried: HEAT/HEATING PAD     Last home medication taken and when: Oxycodone and IBU 0600 today    Any Refills Needed?: No     Does patient have transportation & length of time to get to clinic: No- pt will need transportation, unless an asap opening occurs and SW cannot assist w/ transport, then said she will get her own ride.     Recommendations:   0712 paged Dr. Duncan.   0719 secure chat to Patricia Mantilla  0728 okayed by Patricia Mantilla to come in to infusion if appt opens.   0926 call to pt to offer appt in BMT infusion. Pt states she will find her own ride here, hopes to be here between . Will need help with transportation home.   0930 message sent to scheduling.   0931 message sent to SW to assist w/ setting up transportation home.     If you do not hear from the infusion center by 2pm then you will not be able to get in for an infusion today. If symptoms worsen while waiting for call back, and/or you experience fever, chills, SOB, chest pain, cough, n/v, dizziness, numbness, swelling, discoloration of extremities, then seek emergency evaluation in Emergency Department.     Please note, if you are late for your appt, you risk losing your infusion appt as it may delay another patient's infusion who arrived on  time.

## 2023-12-04 NOTE — PROGRESS NOTES
Infusion Nursing Note:  Jennifer Cervantes presents today for add-on infusion.    Patient seen by provider today: No   present during visit today: Not Applicable.    Note: Patient received dilaudid x3, 1 L LR bolus, zofran, benadryl, and toradol for 9/10 arm pain with some relief. Patient was stable prior to discharge.      Intravenous Access:  Implanted Port.    Treatment Conditions:  Results reviewed, labs MET treatment parameters, ok to proceed with treatment.      Post Infusion Assessment:  Patient tolerated infusion without incident.       Discharge Plan:   Patient and/or family verbalized understanding of discharge instructions and all questions answered.      Vicenta Boone RN

## 2023-12-04 NOTE — PROGRESS NOTES
Social Work - Transportation  [facility]    Data/Intervention:  Patient Name: Jennifer Cervantes Goes By: Jennifer    /Age: 2000 (23 year old)    [facility]  Reason for Referral:  support requested for patient transportation needs for today's appointment.  Assessment:  called [facility] [employer] to arrange ride will call ride home. through patient's insurance. Patient will need to call [phone] when ready for return ride home.  Plan: Patient is aware of the transportation plan.  available to assist with any other needs.  CARLOS Chavez,LGSW  Hematology/Oncology Social Worker  Phone:[phone] Pager: [phone]

## 2023-12-06 ENCOUNTER — PATIENT OUTREACH (OUTPATIENT)
Dept: CARE COORDINATION | Facility: CLINIC | Age: 23
End: 2023-12-06

## 2023-12-06 ENCOUNTER — NURSE TRIAGE (OUTPATIENT)
Dept: ONCOLOGY | Facility: CLINIC | Age: 23
End: 2023-12-06

## 2023-12-06 ENCOUNTER — INFUSION THERAPY VISIT (OUTPATIENT)
Dept: TRANSPLANT | Facility: CLINIC | Age: 23
End: 2023-12-06
Attending: PEDIATRICS
Payer: COMMERCIAL

## 2023-12-06 VITALS
DIASTOLIC BLOOD PRESSURE: 75 MMHG | OXYGEN SATURATION: 97 % | HEART RATE: 86 BPM | TEMPERATURE: 98.2 F | SYSTOLIC BLOOD PRESSURE: 133 MMHG | RESPIRATION RATE: 16 BRPM

## 2023-12-06 DIAGNOSIS — D57.00 SICKLE CELL PAIN CRISIS (H): Primary | ICD-10-CM

## 2023-12-06 DIAGNOSIS — G81.10 SPASTIC HEMIPLEGIA, UNSPECIFIED ETIOLOGY, UNSPECIFIED LATERALITY (H): ICD-10-CM

## 2023-12-06 PROCEDURE — 250N000013 HC RX MED GY IP 250 OP 250 PS 637: Performed by: PEDIATRICS

## 2023-12-06 PROCEDURE — 96361 HYDRATE IV INFUSION ADD-ON: CPT

## 2023-12-06 PROCEDURE — 96374 THER/PROPH/DIAG INJ IV PUSH: CPT

## 2023-12-06 PROCEDURE — 96376 TX/PRO/DX INJ SAME DRUG ADON: CPT

## 2023-12-06 PROCEDURE — 250N000011 HC RX IP 250 OP 636: Mod: JZ | Performed by: PEDIATRICS

## 2023-12-06 PROCEDURE — 258N000003 HC RX IP 258 OP 636: Performed by: PEDIATRICS

## 2023-12-06 PROCEDURE — 96375 TX/PRO/DX INJ NEW DRUG ADDON: CPT

## 2023-12-06 RX ORDER — ONDANSETRON 8 MG/1
8 TABLET, ORALLY DISINTEGRATING ORAL
Status: COMPLETED | OUTPATIENT
Start: 2023-12-06 | End: 2023-12-06

## 2023-12-06 RX ORDER — HEPARIN SODIUM (PORCINE) LOCK FLUSH IV SOLN 100 UNIT/ML 100 UNIT/ML
5 SOLUTION INTRAVENOUS
Status: CANCELLED | OUTPATIENT
Start: 2024-01-01

## 2023-12-06 RX ORDER — DIPHENHYDRAMINE HCL 25 MG
25 CAPSULE ORAL
Status: COMPLETED | OUTPATIENT
Start: 2023-12-06 | End: 2023-12-06

## 2023-12-06 RX ORDER — DIPHENHYDRAMINE HCL 25 MG
25 CAPSULE ORAL
Status: CANCELLED
Start: 2024-01-01

## 2023-12-06 RX ORDER — KETOROLAC TROMETHAMINE 30 MG/ML
30 INJECTION, SOLUTION INTRAMUSCULAR; INTRAVENOUS ONCE
Status: CANCELLED
Start: 2024-01-01 | End: 2024-01-01

## 2023-12-06 RX ORDER — ONDANSETRON 4 MG/1
8 TABLET, FILM COATED ORAL
Status: CANCELLED
Start: 2024-01-01

## 2023-12-06 RX ORDER — HEPARIN SODIUM,PORCINE 10 UNIT/ML
5 VIAL (ML) INTRAVENOUS
Status: CANCELLED | OUTPATIENT
Start: 2024-01-01

## 2023-12-06 RX ORDER — KETOROLAC TROMETHAMINE 30 MG/ML
30 INJECTION, SOLUTION INTRAMUSCULAR; INTRAVENOUS ONCE
Status: COMPLETED | OUTPATIENT
Start: 2023-12-06 | End: 2023-12-06

## 2023-12-06 RX ADMIN — SODIUM CHLORIDE, POTASSIUM CHLORIDE, SODIUM LACTATE AND CALCIUM CHLORIDE 1000 ML: 600; 310; 30; 20 INJECTION, SOLUTION INTRAVENOUS at 10:37

## 2023-12-06 RX ADMIN — HYDROMORPHONE HYDROCHLORIDE 1 MG: 1 INJECTION, SOLUTION INTRAMUSCULAR; INTRAVENOUS; SUBCUTANEOUS at 12:44

## 2023-12-06 RX ADMIN — HYDROMORPHONE HYDROCHLORIDE 1 MG: 1 INJECTION, SOLUTION INTRAMUSCULAR; INTRAVENOUS; SUBCUTANEOUS at 10:39

## 2023-12-06 RX ADMIN — DIPHENHYDRAMINE HYDROCHLORIDE 25 MG: 25 CAPSULE ORAL at 10:43

## 2023-12-06 RX ADMIN — HYDROMORPHONE HYDROCHLORIDE 1 MG: 1 INJECTION, SOLUTION INTRAMUSCULAR; INTRAVENOUS; SUBCUTANEOUS at 11:40

## 2023-12-06 RX ADMIN — KETOROLAC TROMETHAMINE 30 MG: 30 INJECTION, SOLUTION INTRAMUSCULAR; INTRAVENOUS at 10:39

## 2023-12-06 RX ADMIN — ONDANSETRON 8 MG: 8 TABLET, ORALLY DISINTEGRATING ORAL at 10:43

## 2023-12-06 ASSESSMENT — PAIN SCALES - GENERAL: PAINLEVEL: EXTREME PAIN (9)

## 2023-12-06 NOTE — TELEPHONE ENCOUNTER
Oncology Nurse Triage - Sickle Cell Pain Crisis:  Situation: Jennifer  calling about Sickle Cell Pain Crisis, requesting to be added on for IV fluids and pain medicine    Background:   Patient's last infusion was 12/3/23 in ED  Last clinic visit date:11/27/23 with Patricia Mantilla CNP  Does patient have active treatment plan?  Yes    Assessment of Symptoms:  Onset/Duration of symptoms: 5 day    Is it typical sickle cell pain? Yes   Location: bilateral arms and middle of back  Character: Sharp           Intensity: 9/10    Any radiation of pain, numbness, tingling, weakness, warmth, swelling, discoloration of arms or legs?No   Fever?No   Chest Pain Present: No   Shortness of breath: No     Other home therapies tried: HEAT/HEATING PAD and WARM BATH   Last home medication taken and when: oxycodone and ibuprofen, and other meds at 0530    Any Refills Needed?: No     Does patient have transportation & length of time to get to clinic: Yes   Has apt today at 0900 and will be finished at 1000, will not need ride to the clinic    0710:Secure chat sent to Patricia Mantilla CNP as pt was in ED 3 days ago.  0719: Patricia approved request for IVF/pm today.    Recommendations:   If you do not hear from the infusion center by 2pm then you will not be able to get in for an infusion today. If symptoms worsen while waiting for call back, and/or you experience fever, chills, SOB, chest pain, cough, n/v, dizziness, numbness, swelling, discoloration of extremities, then seek emergency evaluation in Emergency Department.     Pt voiced understanding  Added to Infusion Wait list.    0751: BMT can take offer apt at 1130  0803: Called Jennifer who confirmed she can be here at 1130. Pt states she has a ride to and from the appointment.  Updated infusion charge nurse.  0806: Message sent to [facility] to schedule.    Message routed to Patricia Mantilla CNP and MAURISIO Asencio

## 2023-12-06 NOTE — PROGRESS NOTES
Infusion Nursing Note:  Jennifer Cervantes presents today for add on infusion.    Patient seen by provider today: No   present during visit today: Not Applicable.    Note: Patient arrived for add on IVF/pain medications. Ongoing pain to mid back and arms bilaterally. Rates pain 9/10. Denies chest pain, SOB, N/V, weakness, fever. ONC toxicity assessment completed. Home medications and allergies reviewed. Received 1L LR over 2 hours, benadryl 25 mg PO, Zofran 8mg ODT, Toradol 30 mg IVP, and Dilaudid 1 mg IVP every 1 hour x 3 doses. Patient reports pain improvement following final dose of dilaudid.     Intravenous Access:  Implanted Port.    Treatment Conditions:  Lab Results   Component Value Date    HGB 8.5 (L) 12/03/2023    WBC 12.0 (H) 12/03/2023    ANEU 9.9 (H) 11/29/2023    ANEUTAUTO 8.2 12/03/2023     (H) 12/03/2023        Lab Results   Component Value Date     12/03/2023    POTASSIUM 4.4 12/03/2023    MAG 2.0 11/11/2021    CR 0.58 12/03/2023    MICAH 8.9 12/03/2023    BILITOTAL 2.7 (H) 12/03/2023    ALBUMIN 4.5 12/03/2023    ALT 21 12/03/2023    AST 35 12/03/2023         Post Infusion Assessment:  Patient tolerated infusion without incident.  Blood return noted pre and post infusion.  Site patent and intact, free from redness, edema or discomfort.  No evidence of extravasations.  Access discontinued per protocol.       Discharge Plan:   AVS to patient via MYCHART.    Patient discharged in stable condition accompanied by: self.  Departure Mode: Ambulatory.      Jennifer Lai RN

## 2023-12-06 NOTE — PROGRESS NOTES
Community Health Worker Note: Telephone Call  Oncology Clinic     Data/Intervention:  Patient Name:  Jennifer Cervantes DOB/Age: [birth year]     Call From: Triage Nurse        Reason for Call:  Transportation      Assessment:     CHW also called [employer]  ([phone]  to arrange ride through patient's insurance.  [employer] arranged a round trip ride with [employer] transportation  Patient will need to call when ready for return ride home [phone]. Talked to patient and she will call them to setup  time.     Plan:  Patient is aware of the transportation plan. /CHW available to assist with any other needs.      Isaura OTTO Community Health Worker  Clinic Care Coordination  [facility]  Phone: [phone]

## 2023-12-08 ENCOUNTER — HOSPITAL ENCOUNTER (EMERGENCY)
Facility: CLINIC | Age: 23
Discharge: HOME | End: 2023-12-08
Attending: EMERGENCY MEDICINE | Admitting: EMERGENCY MEDICINE
Payer: COMMERCIAL

## 2023-12-08 ENCOUNTER — APPOINTMENT (OUTPATIENT)
Dept: GENERAL RADIOLOGY | Facility: CLINIC | Age: 23
End: 2023-12-08
Attending: EMERGENCY MEDICINE
Payer: COMMERCIAL

## 2023-12-08 VITALS
WEIGHT: 150 LBS | TEMPERATURE: 98.3 F | RESPIRATION RATE: 16 BRPM | HEART RATE: 87 BPM | BODY MASS INDEX: 25.75 KG/M2 | OXYGEN SATURATION: 95 % | DIASTOLIC BLOOD PRESSURE: 85 MMHG | SYSTOLIC BLOOD PRESSURE: 124 MMHG

## 2023-12-08 DIAGNOSIS — D57.00 SICKLE CELL PAIN CRISIS (H): ICD-10-CM

## 2023-12-08 LAB
ALBUMIN SERPL BCG-MCNC: 4.5 G/DL (ref 3.5–5.2)
ALP SERPL-CCNC: 65 U/L (ref 40–150)
ALT SERPL W P-5'-P-CCNC: 23 U/L (ref 0–50)
ANION GAP SERPL CALCULATED.3IONS-SCNC: 8 MMOL/L (ref 7–15)
AST SERPL W P-5'-P-CCNC: 49 U/L (ref 0–45)
ATRIAL RATE - MUSE: 82 BPM
BASOPHILS # BLD AUTO: 0.3 10E3/UL (ref 0–0.2)
BASOPHILS NFR BLD AUTO: 2 %
BILIRUB SERPL-MCNC: 2.5 MG/DL
BUN SERPL-MCNC: 9.9 MG/DL (ref 6–20)
CALCIUM SERPL-MCNC: 8.6 MG/DL (ref 8.6–10)
CHLORIDE SERPL-SCNC: 105 MMOL/L (ref 98–107)
CREAT SERPL-MCNC: 0.47 MG/DL (ref 0.51–0.95)
DEPRECATED HCO3 PLAS-SCNC: 25 MMOL/L (ref 22–29)
DIASTOLIC BLOOD PRESSURE - MUSE: NORMAL MMHG
EGFRCR SERPLBLD CKD-EPI 2021: >90 ML/MIN/1.73M2
EOSINOPHIL # BLD AUTO: 0.5 10E3/UL (ref 0–0.7)
EOSINOPHIL NFR BLD AUTO: 5 %
ERYTHROCYTE [DISTWIDTH] IN BLOOD BY AUTOMATED COUNT: 20.8 % (ref 10–15)
GLUCOSE SERPL-MCNC: 100 MG/DL (ref 70–99)
HCT VFR BLD AUTO: 21.5 % (ref 35–47)
HGB BLD-MCNC: 7.4 G/DL (ref 11.7–15.7)
IMM GRANULOCYTES # BLD: 0 10E3/UL
IMM GRANULOCYTES NFR BLD: 0 %
INTERPRETATION ECG - MUSE: NORMAL
LYMPHOCYTES # BLD AUTO: 2.6 10E3/UL (ref 0.8–5.3)
LYMPHOCYTES NFR BLD AUTO: 24 %
MCH RBC QN AUTO: 29.5 PG (ref 26.5–33)
MCHC RBC AUTO-ENTMCNC: 34.4 G/DL (ref 31.5–36.5)
MCV RBC AUTO: 86 FL (ref 78–100)
MONOCYTES # BLD AUTO: 1 10E3/UL (ref 0–1.3)
MONOCYTES NFR BLD AUTO: 9 %
NEUTROPHILS # BLD AUTO: 6.5 10E3/UL (ref 1.6–8.3)
NEUTROPHILS NFR BLD AUTO: 60 %
NRBC # BLD AUTO: 0.1 10E3/UL
NRBC BLD AUTO-RTO: 1 /100
P AXIS - MUSE: 70 DEGREES
PLATELET # BLD AUTO: 455 10E3/UL (ref 150–450)
POTASSIUM SERPL-SCNC: 3.7 MMOL/L (ref 3.4–5.3)
PR INTERVAL - MUSE: 158 MS
PROT SERPL-MCNC: 7.1 G/DL (ref 6.4–8.3)
QRS DURATION - MUSE: 76 MS
QT - MUSE: 402 MS
QTC - MUSE: 469 MS
R AXIS - MUSE: 39 DEGREES
RBC # BLD AUTO: 2.51 10E6/UL (ref 3.8–5.2)
RETICS # AUTO: 0.24 10E6/UL (ref 0.03–0.1)
RETICS/RBC NFR AUTO: 9.6 % (ref 0.5–2)
SODIUM SERPL-SCNC: 138 MMOL/L (ref 135–145)
SYSTOLIC BLOOD PRESSURE - MUSE: NORMAL MMHG
T AXIS - MUSE: 19 DEGREES
VENTRICULAR RATE- MUSE: 82 BPM
WBC # BLD AUTO: 11 10E3/UL (ref 4–11)

## 2023-12-08 PROCEDURE — 80053 COMPREHEN METABOLIC PANEL: CPT | Performed by: EMERGENCY MEDICINE

## 2023-12-08 PROCEDURE — 250N000011 HC RX IP 250 OP 636: Mod: JZ | Performed by: EMERGENCY MEDICINE

## 2023-12-08 PROCEDURE — 36415 COLL VENOUS BLD VENIPUNCTURE: CPT | Performed by: EMERGENCY MEDICINE

## 2023-12-08 PROCEDURE — 96361 HYDRATE IV INFUSION ADD-ON: CPT | Performed by: EMERGENCY MEDICINE

## 2023-12-08 PROCEDURE — 96376 TX/PRO/DX INJ SAME DRUG ADON: CPT | Performed by: EMERGENCY MEDICINE

## 2023-12-08 PROCEDURE — 93010 ELECTROCARDIOGRAM REPORT: CPT | Performed by: EMERGENCY MEDICINE

## 2023-12-08 PROCEDURE — 99285 EMERGENCY DEPT VISIT HI MDM: CPT | Mod: 25 | Performed by: EMERGENCY MEDICINE

## 2023-12-08 PROCEDURE — 85025 COMPLETE CBC W/AUTO DIFF WBC: CPT | Performed by: EMERGENCY MEDICINE

## 2023-12-08 PROCEDURE — 258N000003 HC RX IP 258 OP 636: Performed by: EMERGENCY MEDICINE

## 2023-12-08 PROCEDURE — 71046 X-RAY EXAM CHEST 2 VIEWS: CPT

## 2023-12-08 PROCEDURE — 85045 AUTOMATED RETICULOCYTE COUNT: CPT | Performed by: EMERGENCY MEDICINE

## 2023-12-08 PROCEDURE — 93005 ELECTROCARDIOGRAM TRACING: CPT | Performed by: EMERGENCY MEDICINE

## 2023-12-08 PROCEDURE — 96374 THER/PROPH/DIAG INJ IV PUSH: CPT | Performed by: EMERGENCY MEDICINE

## 2023-12-08 RX ORDER — KETOROLAC TROMETHAMINE 15 MG/ML
15 INJECTION, SOLUTION INTRAMUSCULAR; INTRAVENOUS
Status: DISCONTINUED | OUTPATIENT
Start: 2023-12-08 | End: 2023-12-08 | Stop reason: HOSPADM

## 2023-12-08 RX ORDER — HEPARIN SODIUM (PORCINE) LOCK FLUSH IV SOLN 100 UNIT/ML 100 UNIT/ML
5-10 SOLUTION INTRAVENOUS
Status: DISCONTINUED | OUTPATIENT
Start: 2023-12-08 | End: 2023-12-08 | Stop reason: HOSPADM

## 2023-12-08 RX ORDER — ONDANSETRON 2 MG/ML
8 INJECTION INTRAMUSCULAR; INTRAVENOUS
Status: DISCONTINUED | OUTPATIENT
Start: 2023-12-08 | End: 2023-12-08 | Stop reason: HOSPADM

## 2023-12-08 RX ORDER — OXYCODONE HYDROCHLORIDE 10 MG/1
10 TABLET ORAL EVERY 4 HOURS PRN
Qty: 25 TABLET | Refills: 0 | Status: SHIPPED | OUTPATIENT
Start: 2023-12-08 | End: 2023-12-14

## 2023-12-08 RX ADMIN — SODIUM CHLORIDE, POTASSIUM CHLORIDE, SODIUM LACTATE AND CALCIUM CHLORIDE 1000 ML: 600; 310; 30; 20 INJECTION, SOLUTION INTRAVENOUS at 03:21

## 2023-12-08 RX ADMIN — HEPARIN 5 ML: 100 SYRINGE at 06:41

## 2023-12-08 RX ADMIN — HYDROMORPHONE HYDROCHLORIDE 1 MG: 1 INJECTION, SOLUTION INTRAMUSCULAR; INTRAVENOUS; SUBCUTANEOUS at 04:59

## 2023-12-08 RX ADMIN — HYDROMORPHONE HYDROCHLORIDE 1 MG: 1 INJECTION, SOLUTION INTRAMUSCULAR; INTRAVENOUS; SUBCUTANEOUS at 03:15

## 2023-12-08 RX ADMIN — HYDROMORPHONE HYDROCHLORIDE 1 MG: 1 INJECTION, SOLUTION INTRAMUSCULAR; INTRAVENOUS; SUBCUTANEOUS at 06:15

## 2023-12-08 ASSESSMENT — ACTIVITIES OF DAILY LIVING (ADL)
ADLS_ACUITY_SCORE: 33
ADLS_ACUITY_SCORE: 35

## 2023-12-08 NOTE — ED PROVIDER NOTES
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  Pain started 1.5 days ago.  Patient ran out of oxycodone this past morning, has used her last dose.  She has a refill available this coming day.  Patient has tried hot packs, hot shower, and rest along with her muscle relaxer and ibuprofen.  Pain is diffuse around the body, does also have a heaviness in the chest.  No cough nor fever.  No vomiting or diarrhea.  No abdominal pain.  Pain quality feels similar to past sickle pain flares.    Past Medical History  Past Medical History:   Diagnosis Date    Anxiety     Bleeding disorder (H24)     Blood clotting disorder (H24)     Cerebral infarction (H) 2015    Cognitive developmental delay     low IQ. 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 side (H)     right hand contractures    Iron overload due to repeated red blood cell transfusions     Migraines     Multiple subsegmental pulmonary emboli without acute cor pulmonale (H) 02/01/2021    Oppositional defiant behavior     Presence of intrauterine contraceptive device 2/18/2020    Superficial venous thrombosis of arm, right 03/25/2021    Uncomplicated asthma      Past Surgical History:   Procedure Laterality Date    AS INSERT TUNNELED CV 2 CATH W/O PORT/PUMP      CHOLECYSTECTOMY      CV RIGHT HEART CATH MEASUREMENTS RECORDED N/A 11/18/2021    Procedure: Right Heart Cath;  Surgeon: Jackson Stauffer MD;  Location:  HEART CARDIAC CATH LAB    INSERT PORT VASCULAR ACCESS Left 4/21/2021    Procedure: INSERTION, VASCULAR ACCESS PORT (NOT SURE ON SIDE UNTIL REMOVAL);  Surgeon: Rajan More MD;  [place] [facility] [place]    IR CHEST PORT PLACEMENT > 5 YRS OF AGE  4/21/2021    IR  CVC NON TUNNEL LINE REMOVAL  5/6/2021    IR CVC NON TUNNEL PLACEMENT > 5 YRS  04/07/2020    IR CVC NON TUNNEL PLACEMENT > 5 YRS  4/30/2021    IR CVC NON TUNNEL PLACEMENT > 5 YRS  9/7/2022    IR PORT REMOVAL LEFT  4/21/2021    REMOVE PORT VASCULAR ACCESS Left 4/21/2021    Procedure: REMOVAL, VASCULAR ACCESS PORT LEFT;  Surgeon: Rajan More MD;  Location: [facility] OR    REPAIR TENDON ELBOW Right 10/02/2019    Procedure: Right Elbow Flexor Lengthening, Flexor Pronator Slide Of Wrist and Finger, Thumb Adductor Lengthening;  Surgeon: Anai Franco MD;  Location: [facility] OR    TONSILLECTOMY Bilateral 10/02/2019    Procedure: Bilateral Tonsillectomy;  Surgeon: Farhana Guy MD;  Location: [facility] OR    ZZC BREAST REDUCTION (INCLUDES LIPO) TIER 3 Bilateral 04/23/2019     albuterol (PROAIR HFA/PROVENTIL HFA/VENTOLIN HFA) 108 (90 Base) MCG/ACT inhaler  albuterol (PROVENTIL) (2.5 MG/3ML) 0.083% neb solution  aspirin (ASA) 81 MG chewable tablet  budesonide-formoterol (SYMBICORT) 160-4.5 MCG/ACT Inhaler  cetirizine (ZYRTEC) 10 MG tablet  deferasirox (JADENU) 360 MG tablet  diphenhydrAMINE (BENADRYL) 25 MG capsule  EPINEPHrine (ANY BX GENERIC EQUIV) 0.3 MG/0.3ML injection 2-pack  Hydroxyurea 1000 MG TABS  methocarbamol (ROBAXIN) 750 MG tablet  ondansetron (ZOFRAN) 8 MG tablet  acetaminophen (TYLENOL) 325 MG tablet  naloxone (NARCAN) 4 MG/0.1ML nasal spray  oxyCODONE IR (ROXICODONE) 10 MG tablet      Allergies   Allergen Reactions    Contrast Dye      Hives and breathing issues    Fish-Derived Products Hives    Seafood Hives    Adhesive Tape Hives     Primipore dressing causes hives    Gadolinium     Iodinated Contrast Media      Family History  Family History   Problem Relation Age of Onset    Sickle Cell Trait Mother     Hypertension Mother     Asthma Mother     Sickle Cell Trait Father     Glaucoma No family hx of     Macular Degeneration No family hx of     Diabetes No family hx of     Gout No family hx of       Social History   Social History     Tobacco Use    Smoking status: Never     Passive exposure: Never    Smokeless tobacco: Never   Substance Use Topics    Alcohol use: Not Currently     Alcohol/week: 0.0 standard drinks of alcohol    Drug use: Never         A medically appropriate review of systems was performed with pertinent positives and negatives noted in the HPI, and all other systems negative.    Physical Exam   BP: 124/85  Pulse: 87  Temp: 98.3  F (36.8  C)  Resp: 16  Weight: 68 kg (150 lb)  SpO2: 95 %    Physical Exam  General: appears somewhat uncomfortable. Appears stated age.   HENT: MMM, no oropharyngeal lesions  Eyes: PERRL, normal sclerae  Cardio: regular rate. Regular rhythm. Extremities well perfused  Resp: Normal work of breathing, normal respiratory rate.  Neuro: alert and fully oriented. CN II-XII grossly intact. Grossly normal strength and sensation in all extremities.   MSK: no deformities. Grossly normal ROM in extremities.   Integumentary/Skin: no rash visualized, normal color  Psych: normal affect, normal behavior    ED Course      Procedures            EKG Interpretation:      Interpreted by Huang Domingo MD  Time reviewed: 0217  Symptoms at time of EKG: chest discomfort   Rhythm: normal sinus   Rate: normal  Axis: normal  Ectopy: none  Conduction: normal  ST Segments/ T Waves: No ST-T wave changes  Q Waves: none  Comparison to prior: Compared to 11/11/2023 there are no significant changes    Clinical Impression: normal EKG         Labs Ordered and Resulted from Time of ED Arrival to Time of ED Departure   RETICULOCYTE COUNT - Abnormal       Result Value    % Reticulocyte 9.6 (*)     Absolute Reticulocyte 0.241 (*)    COMPREHENSIVE METABOLIC PANEL - Abnormal    Sodium 138      Potassium 3.7      Carbon Dioxide (CO2) 25      Anion Gap 8      Urea Nitrogen 9.9      Creatinine 0.47 (*)     GFR Estimate >90      Calcium 8.6      Chloride 105      Glucose 100 (*)     Alkaline Phosphatase  65      AST 49 (*)     ALT 23      Protein Total 7.1      Albumin 4.5      Bilirubin Total 2.5 (*)    CBC WITH PLATELETS AND DIFFERENTIAL - Abnormal    WBC Count 11.0      RBC Count 2.51 (*)     Hemoglobin 7.4 (*)     Hematocrit 21.5 (*)     MCV 86      MCH 29.5      MCHC 34.4      RDW 20.8 (*)     Platelet Count 455 (*)     % Neutrophils 60      % Lymphocytes 24      % Monocytes 9      % Eosinophils 5      % Basophils 2      % Immature Granulocytes 0      NRBCs per 100 WBC 1 (*)     Absolute Neutrophils 6.5      Absolute Lymphocytes 2.6      Absolute Monocytes 1.0      Absolute Eosinophils 0.5      Absolute Basophils 0.3 (*)     Absolute Immature Granulocytes 0.0      Absolute NRBCs 0.1       Chest XR,  PA & LAT   Final Result   IMPRESSION: Left internal jugular venous Port-A-Cath terminates in the lower SVC. Lungs appear clear. Normal heart size and pulmonary vascularity. Postcholecystectomy.           Medical Decision Making  The patient's presentation was of high complexity (a chronic illness severe exacerbation, progression, or side effect of treatment).    The patient's evaluation involved:  ordering and/or review of 3+ test(s) in this encounter (see separate area of note for details)  independent interpretation of testing performed by another health professional (CXR)    The patient's management necessitated high risk (a parenteral controlled substance).      Assessments & Plan (with Medical Decision Making)   Patient presenting with diffuse pain with quality similar to past sickle pain flares, also chest heaviness. Vitals in the ED unremarkable. Nursing notes reviewed.     Hemoglobin 7.4, similar to previous.  Bilirubin 2.5, similar to previous.  Reticulocyte count appropriately elevated.  Chest x-ray without evidence of acute chest syndrome nor other visualized acute pathology such as pneumonia, pneumothorax, nor effusion.  EKG showed normal sinus rhythm without evidence of acute cardiac ischemia.    In the  ED, the patient's symptoms were managed with hydromorphone and LR bolus in line with her ED care plan, with improvement in symptoms upon reassessment.  Patient then felt sufficient relief to return to outpatient management.    The complete clinical picture is most consistent with sickle cell pain crisis. After counseling on the diagnosis, work-up, and treatment plan, the patient was discharged to home. The patient was advised to follow-up with primary care in a few days. The patient was advised to return to the ED if worsening symptoms, or any urgent health concerns.     Final diagnoses:   Sickle cell pain crisis (H)     New Prescriptions    No medications on file     --  Huang Domingo MD   Emergency Medicine   [facility] EMERGENCY DEPARTMENT  12/8/2023       Huang Domingo MD  12/08/23 0622

## 2023-12-08 NOTE — DISCHARGE INSTRUCTIONS
Instructions from your doctor today:  Emergency Department (ED) testing is focused on the potential causes of your symptoms that are the most dangerous possibilities, and cannot cover every possibility. Based on the evaluation, it was deemed sufficiently safe to discharge and continue management through the clinics. Thus, follow-up is very important to assess for improvement/worsening, potential further testing, and potential treatment adjustments. If you were given opioid pain medications or other medications that can make you drowsy while in the ED, you should not drive for at least several hours and not until you feel completely back to normal.     Please make an appointment to follow up with:  - Your Primary Care Provider in 1-3 days  - If you do not have a primary care provider, you can be seen in follow-up and establish care by calling any of the clinics below:     - Primary Care Center (phone: [phone])     - Primary Care / [facility] (phone: [phone])   - Have your clinic provider review the results from today's visit with you again, including any potential follow-up or additional testing that may be needed based on the results. Occasionally, incidental findings are found on later review by radiologists that may need follow-up.     Return to the Emergency Department immediately if you have worsening symptoms, or any other urgent health concerns.

## 2023-12-08 NOTE — TELEPHONE ENCOUNTER
Narcotic Refill Request    Medication(s) requested:  Oxycodone 10mg  Person Requesting Refill:Jennifer (pt)   What pain is the medication treating: sickle cell crisis pain  How is the medication being taken?:1 tablet every 4 hours  Does pt have enough for today? Non, was inED early this morning for IVF/Pain.   Is pain being adequately controlled on the current regimen?: Requires IVF/Pain 2-3x weekly  Experiencing any side effects from medication?: denies    Date of most recent appointment:  11/27/23 Reta Dalton CNP  Any No Show Visits:none recently  Next appointment:   12/28/23   Last fill date and by whom:  12/28/23 Reta Johnson CNP   Reviewed: yes      Send to provider: routed to farhan johnson

## 2023-12-11 ENCOUNTER — NURSE TRIAGE (OUTPATIENT)
Dept: ONCOLOGY | Facility: CLINIC | Age: 23
End: 2023-12-11
Payer: COMMERCIAL

## 2023-12-11 ENCOUNTER — INFUSION THERAPY VISIT (OUTPATIENT)
Dept: INFUSION THERAPY | Facility: CLINIC | Age: 23
End: 2023-12-11
Attending: PEDIATRICS
Payer: COMMERCIAL

## 2023-12-11 ENCOUNTER — PATIENT OUTREACH (OUTPATIENT)
Dept: CARE COORDINATION | Facility: CLINIC | Age: 23
End: 2023-12-11
Payer: COMMERCIAL

## 2023-12-11 VITALS
DIASTOLIC BLOOD PRESSURE: 76 MMHG | RESPIRATION RATE: 16 BRPM | HEART RATE: 89 BPM | SYSTOLIC BLOOD PRESSURE: 117 MMHG | TEMPERATURE: 98.1 F

## 2023-12-11 DIAGNOSIS — G81.10 SPASTIC HEMIPLEGIA, UNSPECIFIED ETIOLOGY, UNSPECIFIED LATERALITY (H): ICD-10-CM

## 2023-12-11 DIAGNOSIS — D57.00 SICKLE CELL PAIN CRISIS (H): Primary | ICD-10-CM

## 2023-12-11 PROCEDURE — 96374 THER/PROPH/DIAG INJ IV PUSH: CPT

## 2023-12-11 PROCEDURE — 96375 TX/PRO/DX INJ NEW DRUG ADDON: CPT

## 2023-12-11 PROCEDURE — 96376 TX/PRO/DX INJ SAME DRUG ADON: CPT

## 2023-12-11 PROCEDURE — 250N000011 HC RX IP 250 OP 636: Mod: JZ | Performed by: PEDIATRICS

## 2023-12-11 PROCEDURE — 258N000003 HC RX IP 258 OP 636: Performed by: PEDIATRICS

## 2023-12-11 PROCEDURE — 250N000013 HC RX MED GY IP 250 OP 250 PS 637: Performed by: PEDIATRICS

## 2023-12-11 PROCEDURE — 96361 HYDRATE IV INFUSION ADD-ON: CPT

## 2023-12-11 RX ORDER — ONDANSETRON 4 MG/1
8 TABLET, FILM COATED ORAL
Status: CANCELLED
Start: 2024-01-01

## 2023-12-11 RX ORDER — DIPHENHYDRAMINE HCL 25 MG
25 CAPSULE ORAL
Status: COMPLETED | OUTPATIENT
Start: 2023-12-11 | End: 2023-12-11

## 2023-12-11 RX ORDER — ONDANSETRON 4 MG/1
8 TABLET, ORALLY DISINTEGRATING ORAL
Status: COMPLETED | OUTPATIENT
Start: 2023-12-11 | End: 2023-12-11

## 2023-12-11 RX ORDER — HEPARIN SODIUM (PORCINE) LOCK FLUSH IV SOLN 100 UNIT/ML 100 UNIT/ML
5 SOLUTION INTRAVENOUS
Status: DISCONTINUED | OUTPATIENT
Start: 2023-12-11 | End: 2023-12-11 | Stop reason: HOSPADM

## 2023-12-11 RX ORDER — DIPHENHYDRAMINE HCL 25 MG
25 CAPSULE ORAL
Status: CANCELLED
Start: 2024-01-01

## 2023-12-11 RX ORDER — KETOROLAC TROMETHAMINE 30 MG/ML
30 INJECTION, SOLUTION INTRAMUSCULAR; INTRAVENOUS ONCE
Status: COMPLETED | OUTPATIENT
Start: 2023-12-11 | End: 2023-12-11

## 2023-12-11 RX ORDER — HEPARIN SODIUM (PORCINE) LOCK FLUSH IV SOLN 100 UNIT/ML 100 UNIT/ML
5 SOLUTION INTRAVENOUS
Status: CANCELLED | OUTPATIENT
Start: 2024-01-01

## 2023-12-11 RX ORDER — KETOROLAC TROMETHAMINE 30 MG/ML
30 INJECTION, SOLUTION INTRAMUSCULAR; INTRAVENOUS ONCE
Status: CANCELLED
Start: 2024-01-01 | End: 2024-01-01

## 2023-12-11 RX ORDER — HEPARIN SODIUM,PORCINE 10 UNIT/ML
5 VIAL (ML) INTRAVENOUS
Status: CANCELLED | OUTPATIENT
Start: 2024-01-01

## 2023-12-11 RX ADMIN — HYDROMORPHONE HYDROCHLORIDE 1 MG: 1 INJECTION, SOLUTION INTRAMUSCULAR; INTRAVENOUS; SUBCUTANEOUS at 17:10

## 2023-12-11 RX ADMIN — HYDROMORPHONE HYDROCHLORIDE 1 MG: 1 INJECTION, SOLUTION INTRAMUSCULAR; INTRAVENOUS; SUBCUTANEOUS at 15:17

## 2023-12-11 RX ADMIN — HYDROMORPHONE HYDROCHLORIDE 1 MG: 1 INJECTION, SOLUTION INTRAMUSCULAR; INTRAVENOUS; SUBCUTANEOUS at 16:17

## 2023-12-11 RX ADMIN — ONDANSETRON 8 MG: 4 TABLET, ORALLY DISINTEGRATING ORAL at 15:20

## 2023-12-11 RX ADMIN — KETOROLAC TROMETHAMINE 30 MG: 30 INJECTION, SOLUTION INTRAMUSCULAR; INTRAVENOUS at 15:18

## 2023-12-11 RX ADMIN — Medication 5 ML: at 17:39

## 2023-12-11 RX ADMIN — SODIUM CHLORIDE, POTASSIUM CHLORIDE, SODIUM LACTATE AND CALCIUM CHLORIDE 1000 ML: 600; 310; 30; 20 INJECTION, SOLUTION INTRAVENOUS at 15:14

## 2023-12-11 RX ADMIN — DIPHENHYDRAMINE HYDROCHLORIDE 25 MG: 25 CAPSULE ORAL at 15:20

## 2023-12-11 NOTE — PROGRESS NOTES
Social Work - Transportation  [facility]    Data/Intervention:  Patient Name: Jennifer Cervantes Goes By: Jennifer    /Age: 2000 (23 year old)    Referral From: [facility]  Reason for Referral:  support requested for patient transportation needs for today's appointment.  Assessment:  called [employer] to arrange ride through patient's insurance. [employer] arranged  for patient from home with [employer]. Patient will need to call [phone] when ready for return ride home.  Plan: Patient is aware of the transportation plan.  available to assist with any other needs.    CARLOS Chavez,LGUDAY  Hematology/Oncology Social Worker  Phone:[phone] Pager: [phone]

## 2023-12-11 NOTE — TELEPHONE ENCOUNTER
Oncology Nurse Triage - Sickle Cell Pain Crisis:    Situation: Jennifer  calling about Sickle Cell Pain Crisis, requesting to be added on for IV fluids and pain medicine    Background:     Patient's last infusion was 12/6/23 &12/8/23 was in ER  Last clinic visit date:11/27/23 Patricia Mantilla   Does patient have active treatment plan?  Yes      Assessment of Symptoms:  Onset/Duration of symptoms: 1 day    Is it typical sickle cell pain? Yes   Location: generalized   Character: Sharp           Intensity: 10/10    Any radiation of pain, numbness, tingling, weakness, warmth, swelling, discoloration of arms or legs?No     Fever?No  (if yes max temperature recorded in last 24 hours):      Chest Pain Present: No     Shortness of breath: No     Other home therapies tried: HEAT/HEATING PAD and WARM BATH     Last home medication taken and when: oxycodone 6am     Any Refills Needed?: No     Does patient have transportation & length of time to get to clinic: No patient needs assistance with transportation         Recommendations:     Placed on infusion call list     If you do not hear from the infusion center by 2pm then you will not be able to get in for an infusion today. If symptoms worsen while waiting for call back, and/or you experience fever, chills, SOB, chest pain, cough, n/v, dizziness, numbness, swelling, discoloration of extremities, then seek emergency evaluation in Emergency Department.     Please note, if you are late for your appt, you risk losing your infusion appt as it may delay another patient's infusion who arrived on time.

## 2023-12-11 NOTE — TELEPHONE ENCOUNTER
[facility] can take at 3:30   Call placed to Jennifer and she can make it to a 3:30 appt.     Message sent to CCMAKENZIE     Message sent to UDAY

## 2023-12-11 NOTE — PROGRESS NOTES
Nursing Note  Jennifer Cervantes presents [facility] for:   Chief Complaint   Patient presents with    Infusion     IVF, pain meds       During today's [facility] appointment, orders from Dr. Duncan were completed.  Frequency: as needed related to sickle cell pain    Progress note:    Patient identification verified by name and date of birth.  Assessment completed.  Vitals recorded in Doc Flowsheets.  Patient was provided with education regarding medication/procedure and possible side effects.  Patient verbalized understanding.     present during visit today: Not Applicable.    Treatment Conditions: Non-applicable.    Drug Waste Record: No    Infusion length and rate:  infusion given over approximately 2.25 hours    Labs: were not ordered for this appointment.    Vascular access: port accessed today.    Is the next appt scheduled? prn    Post Infusion Assessment:  Patient tolerated infusion without incident.     Discharge Plan:   Follow up plan of care with: ordering provider as scheduled.  Discharge instructions were reviewed with patient.  Patient/representative verbalized understanding of discharge instructions and all questions answered.  Patient discharged [facility] in stable condition.    Yina Ford RN    Administrations This Visit       diphenhydrAMINE (BENADRYL) capsule 25 mg       Admin Date  12/11/2023 Action  $Given Dose  25 mg Route  Oral Documented By  Yina Ford RN              heparin 100 unit/mL injection 5 mL       Admin Date  12/11/2023 Action  $Given Dose  5 mL Route  Intracatheter Documented By  Yina Ford RN              HYDROmorphone (DILAUDID) injection 1 mg       Admin Date  12/11/2023 Action  $Given Dose  1 mg Route  Intravenous Documented By  Yina Ford RN               Admin Date  12/11/2023 Action  $Given Dose  1 mg Route  Intravenous Documented By  Logan  JOE Bran               Admin Date  12/11/2023 Action  $Given Dose  1 mg Route  Intravenous Documented By  Yina Ford, JOE              ketorolac (TORADOL) injection 30 mg       Admin Date  12/11/2023 Action  $Given Dose  30 mg Route  Intravenous Documented By  Yina Ford, JOE              lactated ringers BOLUS 1,000 mL       Admin Date  12/11/2023 Action  $New Bag Dose  1,000 mL Rate  500 mL/hr Route  Intravenous Documented By  Yina Ford, JOE              ondansetron (ZOFRAN ODT) ODT tab 8 mg       Admin Date  12/11/2023 Action  $Given Dose  8 mg Route  Oral Documented By  Yina Ford RN                  /76 (BP Location: Left arm, Patient Position: Semi-Short's, Cuff Size: Adult Regular)   Pulse 89   Temp 98.1  F (36.7  C) (Oral)   Resp 16

## 2023-12-13 ENCOUNTER — NURSE TRIAGE (OUTPATIENT)
Dept: ONCOLOGY | Facility: CLINIC | Age: 23
End: 2023-12-13
Payer: COMMERCIAL

## 2023-12-13 NOTE — TELEPHONE ENCOUNTER
Call from pt, who states she missed a call from a triage nurse. Writer informed that infusion is unable to take her today for appt, advised if pain worsens she should go to ED or can try back to get on wait list first thing tomorrow morning. Pt voiced understanding.

## 2023-12-13 NOTE — TELEPHONE ENCOUNTER
Oncology Nurse Triage - Sickle Cell Pain Crisis:    Situation: Jennifer  calling about Sickle Cell Pain Crisis, requesting to be added on for IV fluids and pain medicine    Background:     Patient's last infusion was 12/11/23  Last clinic visit date:11/27/23 w/Patricia Mantilla  Does patient have active treatment plan?  Yes      Assessment of Symptoms:  Onset/Duration of symptoms: 1 day    Is it typical sickle cell pain? Yes   Location: Both arms and wrists. More left side than right.   Character: Sharp           Intensity: 10/10    Any radiation of pain, numbness, tingling, weakness, warmth, swelling, discoloration of arms or legs?No     Fever?No  (if yes max temperature recorded in last 24 hours):      Chest Pain Present: No     Shortness of breath: No     Other home therapies tried: HEAT/HEATING PAD     Last home medication taken and when: oxycodone, ibuprofen, muscle relaxer 1100 or 1200    Any Refills Needed?: No     Does patient have transportation & length of time to get to clinic: No, will need help with transportation.        Recommendations:       Reviewed with pt if she does not hear from the infusion center by 2pm then you will not be able to get in for an infusion today. If symptoms worsen while waiting for call back, and/or you experience fever, chills, SOB, chest pain, cough, n/v, dizziness, numbness, swelling, discoloration of extremities, then seek emergency evaluation in Emergency Department.     Please note, if you are late for your appt, you risk losing your infusion appt as it may delay another patient's infusion who arrived on time.       Added to infusion wait list for IVF/pain meds per protocol.

## 2023-12-14 ENCOUNTER — NURSE TRIAGE (OUTPATIENT)
Dept: ONCOLOGY | Facility: CLINIC | Age: 23
End: 2023-12-14
Payer: COMMERCIAL

## 2023-12-14 ENCOUNTER — INFUSION THERAPY VISIT (OUTPATIENT)
Dept: TRANSPLANT | Facility: CLINIC | Age: 23
End: 2023-12-14
Attending: PEDIATRICS
Payer: COMMERCIAL

## 2023-12-14 ENCOUNTER — PATIENT OUTREACH (OUTPATIENT)
Dept: CARE COORDINATION | Facility: CLINIC | Age: 23
End: 2023-12-14
Payer: COMMERCIAL

## 2023-12-14 DIAGNOSIS — D57.00 SICKLE CELL PAIN CRISIS (H): ICD-10-CM

## 2023-12-14 DIAGNOSIS — G81.10 SPASTIC HEMIPLEGIA, UNSPECIFIED ETIOLOGY, UNSPECIFIED LATERALITY (H): ICD-10-CM

## 2023-12-14 DIAGNOSIS — D57.00 SICKLE CELL PAIN CRISIS (H): Primary | ICD-10-CM

## 2023-12-14 PROCEDURE — 250N000011 HC RX IP 250 OP 636: Performed by: PEDIATRICS

## 2023-12-14 PROCEDURE — 258N000003 HC RX IP 258 OP 636: Performed by: PEDIATRICS

## 2023-12-14 PROCEDURE — 96376 TX/PRO/DX INJ SAME DRUG ADON: CPT

## 2023-12-14 PROCEDURE — 96375 TX/PRO/DX INJ NEW DRUG ADDON: CPT

## 2023-12-14 PROCEDURE — 250N000013 HC RX MED GY IP 250 OP 250 PS 637: Performed by: PEDIATRICS

## 2023-12-14 PROCEDURE — 96361 HYDRATE IV INFUSION ADD-ON: CPT

## 2023-12-14 PROCEDURE — 96374 THER/PROPH/DIAG INJ IV PUSH: CPT

## 2023-12-14 RX ORDER — DIPHENHYDRAMINE HCL 25 MG
25 CAPSULE ORAL
Status: CANCELLED
Start: 2024-01-01

## 2023-12-14 RX ORDER — OXYCODONE HYDROCHLORIDE 10 MG/1
10 TABLET ORAL EVERY 4 HOURS PRN
Qty: 25 TABLET | Refills: 0 | Status: SHIPPED | OUTPATIENT
Start: 2023-12-15 | End: 2023-12-22

## 2023-12-14 RX ORDER — KETOROLAC TROMETHAMINE 30 MG/ML
30 INJECTION, SOLUTION INTRAMUSCULAR; INTRAVENOUS ONCE
Status: CANCELLED
Start: 2024-01-01 | End: 2024-01-01

## 2023-12-14 RX ORDER — ONDANSETRON 4 MG/1
8 TABLET, FILM COATED ORAL
Status: CANCELLED
Start: 2024-01-01

## 2023-12-14 RX ORDER — ONDANSETRON 4 MG/1
8 TABLET, ORALLY DISINTEGRATING ORAL
Status: COMPLETED | OUTPATIENT
Start: 2023-12-14 | End: 2023-12-14

## 2023-12-14 RX ORDER — KETOROLAC TROMETHAMINE 30 MG/ML
30 INJECTION, SOLUTION INTRAMUSCULAR; INTRAVENOUS ONCE
Status: COMPLETED | OUTPATIENT
Start: 2023-12-14 | End: 2023-12-14

## 2023-12-14 RX ORDER — HEPARIN SODIUM (PORCINE) LOCK FLUSH IV SOLN 100 UNIT/ML 100 UNIT/ML
5 SOLUTION INTRAVENOUS
Status: CANCELLED | OUTPATIENT
Start: 2024-01-01

## 2023-12-14 RX ORDER — HEPARIN SODIUM,PORCINE 10 UNIT/ML
5 VIAL (ML) INTRAVENOUS
Status: CANCELLED | OUTPATIENT
Start: 2024-01-01

## 2023-12-14 RX ORDER — DIPHENHYDRAMINE HCL 25 MG
25 CAPSULE ORAL
Status: COMPLETED | OUTPATIENT
Start: 2023-12-14 | End: 2023-12-14

## 2023-12-14 RX ORDER — HEPARIN SODIUM (PORCINE) LOCK FLUSH IV SOLN 100 UNIT/ML 100 UNIT/ML
5 SOLUTION INTRAVENOUS
Status: DISCONTINUED | OUTPATIENT
Start: 2023-12-14 | End: 2023-12-15 | Stop reason: HOSPADM

## 2023-12-14 RX ADMIN — HYDROMORPHONE HYDROCHLORIDE 1 MG: 1 INJECTION, SOLUTION INTRAMUSCULAR; INTRAVENOUS; SUBCUTANEOUS at 13:58

## 2023-12-14 RX ADMIN — SODIUM CHLORIDE, POTASSIUM CHLORIDE, SODIUM LACTATE AND CALCIUM CHLORIDE 1000 ML: 600; 310; 30; 20 INJECTION, SOLUTION INTRAVENOUS at 12:49

## 2023-12-14 RX ADMIN — DIPHENHYDRAMINE HYDROCHLORIDE 25 MG: 25 CAPSULE ORAL at 14:56

## 2023-12-14 RX ADMIN — KETOROLAC TROMETHAMINE 30 MG: 30 INJECTION, SOLUTION INTRAMUSCULAR; INTRAVENOUS at 12:51

## 2023-12-14 RX ADMIN — HYDROMORPHONE HYDROCHLORIDE 1 MG: 1 INJECTION, SOLUTION INTRAMUSCULAR; INTRAVENOUS; SUBCUTANEOUS at 12:50

## 2023-12-14 RX ADMIN — Medication 5 ML: at 15:00

## 2023-12-14 RX ADMIN — HYDROMORPHONE HYDROCHLORIDE 1 MG: 1 INJECTION, SOLUTION INTRAMUSCULAR; INTRAVENOUS; SUBCUTANEOUS at 14:56

## 2023-12-14 RX ADMIN — ONDANSETRON 8 MG: 4 TABLET, ORALLY DISINTEGRATING ORAL at 14:56

## 2023-12-14 NOTE — PROGRESS NOTES
Infusion Nursing Note:  Jennifer Cervantes presents today for IVF and pain meds.    Patient seen by provider today: No   present during visit today: Not Applicable.    Note: Jennifer here today for IVF and pain meds. She reports 10/10 pain in her left arm that extends from her wrist to her shoulder, not different from her typical sickle cell pain. Port was accessed, Pt received 1L LR bolus, 1mg x3 doses IV dilaudid, 30mg IV toradol, PO benadryl, and PO zofran. Pt tolerated infusion without difficulty. Pain was at an acceptable level on discharge.      Intravenous Access:  Implanted Port.      Post Infusion Assessment:  Patient tolerated infusion without incident.  Blood return noted pre and post infusion.  Site patent and intact, free from redness, edema or discomfort.  No evidence of extravasations.  Access discontinued per protocol.       Discharge Plan:   Patient discharged in stable condition accompanied by: self.  Departure Mode: Ambulatory.      Allison Bowman RN

## 2023-12-14 NOTE — PROGRESS NOTES
Social Work - Transportation  [facility]    Data/Intervention:  Patient Name: Jennifer Cervantes Goes By: Jennifer    /Age: 2000 (23 year old)    Referral From: [facility]   Reason for Referral:  support requested for patient transportation needs for today's appointment.  Assessment:  called [employer] to arrange ride through patient's insurance. [employer] arranged  for patient from home with [employer]. Patient will need to call [phone] when ready for return ride home.  Plan: Patient is aware of the transportation plan.  available to assist with any other needs.    CARLOS Chavez,LGUDAY  Hematology/Oncology Social Worker  Phone:[phone] Pager: [phone]

## 2023-12-14 NOTE — TELEPHONE ENCOUNTER
Narcotic Refill Request  Medication(s) requested:  oxycodone 10 mg tablet  Person Requesting Refill:Jennifer  What pain is the medication treating: sickle cell pain  How is the medication being taken?:as prescribed, 10 mg q 4H  Does pt have enough for today? Yes but will be out tomorrow and it is due tomorrow.   Is pain being adequately controlled on the current regimen?: yes  Experiencing any side effects from medication?: no    Date of most recent appointment:  11/27/23 with Patricia Mantilla CNP  Any No Show Visits:no  Next appointment:   12/28/23 with Patricia Mantilla CNP  Last fill date and by whom:  12/8/23 by Patricia Mantilla CNP   Reviewed: no    Routed/Paged provider: Patricia Mantilla CNP

## 2023-12-16 ENCOUNTER — HOSPITAL ENCOUNTER (EMERGENCY)
Facility: CLINIC | Age: 23
Discharge: HOME | End: 2023-12-16
Attending: EMERGENCY MEDICINE | Admitting: EMERGENCY MEDICINE
Payer: COMMERCIAL

## 2023-12-16 VITALS
HEART RATE: 99 BPM | OXYGEN SATURATION: 98 % | SYSTOLIC BLOOD PRESSURE: 112 MMHG | RESPIRATION RATE: 16 BRPM | TEMPERATURE: 98.3 F | DIASTOLIC BLOOD PRESSURE: 71 MMHG

## 2023-12-16 DIAGNOSIS — D57.00 SICKLE CELL PAIN CRISIS (H): ICD-10-CM

## 2023-12-16 LAB
ANION GAP SERPL CALCULATED.3IONS-SCNC: 7 MMOL/L (ref 7–15)
BASOPHILS # BLD AUTO: 0.2 10E3/UL (ref 0–0.2)
BASOPHILS NFR BLD AUTO: 2 %
BUN SERPL-MCNC: 7.2 MG/DL (ref 6–20)
CALCIUM SERPL-MCNC: 8.8 MG/DL (ref 8.6–10)
CHLORIDE SERPL-SCNC: 103 MMOL/L (ref 98–107)
CREAT SERPL-MCNC: 0.56 MG/DL (ref 0.51–0.95)
DEPRECATED HCO3 PLAS-SCNC: 26 MMOL/L (ref 22–29)
EGFRCR SERPLBLD CKD-EPI 2021: >90 ML/MIN/1.73M2
EOSINOPHIL # BLD AUTO: 0.6 10E3/UL (ref 0–0.7)
EOSINOPHIL NFR BLD AUTO: 5 %
ERYTHROCYTE [DISTWIDTH] IN BLOOD BY AUTOMATED COUNT: 22.5 % (ref 10–15)
GLUCOSE SERPL-MCNC: 86 MG/DL (ref 70–99)
HCT VFR BLD AUTO: 21.8 % (ref 35–47)
HGB BLD-MCNC: 7.2 G/DL (ref 11.7–15.7)
IMM GRANULOCYTES # BLD: 0.1 10E3/UL
IMM GRANULOCYTES NFR BLD: 0 %
LYMPHOCYTES # BLD AUTO: 2.9 10E3/UL (ref 0.8–5.3)
LYMPHOCYTES NFR BLD AUTO: 23 %
MCH RBC QN AUTO: 29.6 PG (ref 26.5–33)
MCHC RBC AUTO-ENTMCNC: 33 G/DL (ref 31.5–36.5)
MCV RBC AUTO: 90 FL (ref 78–100)
MONOCYTES # BLD AUTO: 1 10E3/UL (ref 0–1.3)
MONOCYTES NFR BLD AUTO: 8 %
NEUTROPHILS # BLD AUTO: 8 10E3/UL (ref 1.6–8.3)
NEUTROPHILS NFR BLD AUTO: 62 %
NRBC # BLD AUTO: 0.1 10E3/UL
NRBC BLD AUTO-RTO: 1 /100
PLATELET # BLD AUTO: 430 10E3/UL (ref 150–450)
POTASSIUM SERPL-SCNC: 4.1 MMOL/L (ref 3.4–5.3)
RBC # BLD AUTO: 2.43 10E6/UL (ref 3.8–5.2)
RETICS # AUTO: 0.39 10E6/UL (ref 0.03–0.1)
RETICS/RBC NFR AUTO: 16.1 % (ref 0.5–2)
SODIUM SERPL-SCNC: 136 MMOL/L (ref 135–145)
WBC # BLD AUTO: 12.8 10E3/UL (ref 4–11)

## 2023-12-16 PROCEDURE — 96376 TX/PRO/DX INJ SAME DRUG ADON: CPT

## 2023-12-16 PROCEDURE — 85025 COMPLETE CBC W/AUTO DIFF WBC: CPT | Performed by: EMERGENCY MEDICINE

## 2023-12-16 PROCEDURE — 250N000011 HC RX IP 250 OP 636: Mod: JZ | Performed by: EMERGENCY MEDICINE

## 2023-12-16 PROCEDURE — 96375 TX/PRO/DX INJ NEW DRUG ADDON: CPT | Performed by: EMERGENCY MEDICINE

## 2023-12-16 PROCEDURE — 258N000003 HC RX IP 258 OP 636: Performed by: EMERGENCY MEDICINE

## 2023-12-16 PROCEDURE — 96375 TX/PRO/DX INJ NEW DRUG ADDON: CPT

## 2023-12-16 PROCEDURE — 99284 EMERGENCY DEPT VISIT MOD MDM: CPT | Mod: 25 | Performed by: EMERGENCY MEDICINE

## 2023-12-16 PROCEDURE — 96374 THER/PROPH/DIAG INJ IV PUSH: CPT | Performed by: EMERGENCY MEDICINE

## 2023-12-16 PROCEDURE — 80048 BASIC METABOLIC PNL TOTAL CA: CPT | Performed by: EMERGENCY MEDICINE

## 2023-12-16 PROCEDURE — 96374 THER/PROPH/DIAG INJ IV PUSH: CPT

## 2023-12-16 PROCEDURE — 85045 AUTOMATED RETICULOCYTE COUNT: CPT | Performed by: EMERGENCY MEDICINE

## 2023-12-16 PROCEDURE — 99285 EMERGENCY DEPT VISIT HI MDM: CPT | Performed by: EMERGENCY MEDICINE

## 2023-12-16 PROCEDURE — 96361 HYDRATE IV INFUSION ADD-ON: CPT | Performed by: EMERGENCY MEDICINE

## 2023-12-16 PROCEDURE — 96376 TX/PRO/DX INJ SAME DRUG ADON: CPT | Performed by: EMERGENCY MEDICINE

## 2023-12-16 PROCEDURE — 96361 HYDRATE IV INFUSION ADD-ON: CPT

## 2023-12-16 PROCEDURE — 99284 EMERGENCY DEPT VISIT MOD MDM: CPT | Mod: 25

## 2023-12-16 PROCEDURE — 36415 COLL VENOUS BLD VENIPUNCTURE: CPT | Performed by: EMERGENCY MEDICINE

## 2023-12-16 RX ORDER — ONDANSETRON 2 MG/ML
4 INJECTION INTRAMUSCULAR; INTRAVENOUS EVERY 30 MIN PRN
Status: DISCONTINUED | OUTPATIENT
Start: 2023-12-16 | End: 2023-12-16 | Stop reason: HOSPADM

## 2023-12-16 RX ORDER — HEPARIN SODIUM (PORCINE) LOCK FLUSH IV SOLN 100 UNIT/ML 100 UNIT/ML
5-10 SOLUTION INTRAVENOUS
Status: DISCONTINUED | OUTPATIENT
Start: 2023-12-16 | End: 2023-12-16 | Stop reason: HOSPADM

## 2023-12-16 RX ORDER — KETOROLAC TROMETHAMINE 15 MG/ML
15 INJECTION, SOLUTION INTRAMUSCULAR; INTRAVENOUS ONCE
Status: COMPLETED | OUTPATIENT
Start: 2023-12-16 | End: 2023-12-16

## 2023-12-16 RX ORDER — HEPARIN SODIUM,PORCINE 10 UNIT/ML
5-10 VIAL (ML) INTRAVENOUS EVERY 24 HOURS
Status: DISCONTINUED | OUTPATIENT
Start: 2023-12-16 | End: 2023-12-16 | Stop reason: HOSPADM

## 2023-12-16 RX ORDER — HEPARIN SODIUM,PORCINE 10 UNIT/ML
5-10 VIAL (ML) INTRAVENOUS
Status: DISCONTINUED | OUTPATIENT
Start: 2023-12-16 | End: 2023-12-16 | Stop reason: HOSPADM

## 2023-12-16 RX ADMIN — ONDANSETRON 4 MG: 2 INJECTION INTRAMUSCULAR; INTRAVENOUS at 03:01

## 2023-12-16 RX ADMIN — SODIUM CHLORIDE 1000 ML: 9 INJECTION, SOLUTION INTRAVENOUS at 03:01

## 2023-12-16 RX ADMIN — HYDROMORPHONE HYDROCHLORIDE 1 MG: 1 INJECTION, SOLUTION INTRAMUSCULAR; INTRAVENOUS; SUBCUTANEOUS at 05:36

## 2023-12-16 RX ADMIN — HYDROMORPHONE HYDROCHLORIDE 1 MG: 1 INJECTION, SOLUTION INTRAMUSCULAR; INTRAVENOUS; SUBCUTANEOUS at 03:02

## 2023-12-16 RX ADMIN — KETOROLAC TROMETHAMINE 15 MG: 15 INJECTION, SOLUTION INTRAMUSCULAR; INTRAVENOUS at 03:02

## 2023-12-16 RX ADMIN — HYDROMORPHONE HYDROCHLORIDE 1 MG: 1 INJECTION, SOLUTION INTRAMUSCULAR; INTRAVENOUS; SUBCUTANEOUS at 04:39

## 2023-12-16 RX ADMIN — HEPARIN 5 ML: 100 SYRINGE at 06:22

## 2023-12-16 ASSESSMENT — ACTIVITIES OF DAILY LIVING (ADL)
ADLS_ACUITY_SCORE: 35
ADLS_ACUITY_SCORE: 35

## 2023-12-16 NOTE — ED PROVIDER NOTES
[facility] EMERGENCY DEPARTMENT ([facility])    12/16/23      ED PROVIDER NOTE        History     Chief Complaint   Patient presents with    Sickle Cell Pain Crisis     Pt reports having increased pain rating 9/10 in bilateral shoulder and back. Pt took oxycodone and muscle relaxer's with no relief. Pain increasing throughout day.     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  She was last seen at Infusion therapy 12/14/23.  No fevers.  No nausea or vomiting.  This feels like her usual pain.    Patient has care plan.    Hemoglobin   Date Value Ref Range Status   12/16/2023 7.2 (L) 11.7 - 15.7 g/dL Final   12/08/2023 7.4 (L) 11.7 - 15.7 g/dL Final   07/10/2021 7.8 (L) 11.7 - 15.7 g/dL Final   07/08/2021 8.1 (L) 11.7 - 15.7 g/dL Final          Past Medical History  Past Medical History:   Diagnosis Date    Anxiety     Bleeding disorder (H24)     Blood clotting disorder (H24)     Cerebral infarction (H) 2015    Cognitive developmental delay     low IQ. 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 side (H)     right hand contractures    Iron overload due to repeated red blood cell transfusions     Migraines     Multiple subsegmental pulmonary emboli without acute cor pulmonale (H) 02/01/2021    Oppositional defiant behavior     Presence of intrauterine contraceptive device 2/18/2020    Superficial venous thrombosis of arm, right 03/25/2021    Uncomplicated asthma      Past Surgical History:   Procedure Laterality Date    AS INSERT TUNNELED CV 2 CATH W/O PORT/PUMP      CHOLECYSTECTOMY      CV RIGHT HEART CATH MEASUREMENTS RECORDED N/A 11/18/2021    Procedure: Right Heart Cath;  Surgeon: Jackson Stauffer MD;  Location:   HEART CARDIAC CATH LAB    INSERT PORT VASCULAR ACCESS Left 4/21/2021    Procedure: INSERTION, VASCULAR ACCESS PORT (NOT SURE ON SIDE UNTIL REMOVAL);  Surgeon: Rajan More MD;  Location: [facility] OR    IR CHEST PORT PLACEMENT > 5 YRS OF AGE  4/21/2021    IR CVC NON TUNNEL LINE REMOVAL  5/6/2021    IR CVC NON TUNNEL PLACEMENT > 5 YRS  04/07/2020    IR CVC NON TUNNEL PLACEMENT > 5 YRS  4/30/2021    IR CVC NON TUNNEL PLACEMENT > 5 YRS  9/7/2022    IR PORT REMOVAL LEFT  4/21/2021    REMOVE PORT VASCULAR ACCESS Left 4/21/2021    Procedure: REMOVAL, VASCULAR ACCESS PORT LEFT;  Surgeon: Rajan More MD;  Location: [facility] OR    REPAIR TENDON ELBOW Right 10/02/2019    Procedure: Right Elbow Flexor Lengthening, Flexor Pronator Slide Of Wrist and Finger, Thumb Adductor Lengthening;  Surgeon: Anai Franco MD;  Location: [facility] OR    TONSILLECTOMY Bilateral 10/02/2019    Procedure: Bilateral Tonsillectomy;  Surgeon: Farhana Guy MD;  Location: [facility] OR    ZZC BREAST REDUCTION (INCLUDES LIPO) TIER 3 Bilateral 04/23/2019     acetaminophen (TYLENOL) 325 MG tablet  albuterol (PROAIR HFA/PROVENTIL HFA/VENTOLIN HFA) 108 (90 Base) MCG/ACT inhaler  albuterol (PROVENTIL) (2.5 MG/3ML) 0.083% neb solution  aspirin (ASA) 81 MG chewable tablet  budesonide-formoterol (SYMBICORT) 160-4.5 MCG/ACT Inhaler  cetirizine (ZYRTEC) 10 MG tablet  deferasirox (JADENU) 360 MG tablet  diphenhydrAMINE (BENADRYL) 25 MG capsule  EPINEPHrine (ANY BX GENERIC EQUIV) 0.3 MG/0.3ML injection 2-pack  Hydroxyurea 1000 MG TABS  methocarbamol (ROBAXIN) 750 MG tablet  naloxone (NARCAN) 4 MG/0.1ML nasal spray  ondansetron (ZOFRAN) 8 MG tablet  oxyCODONE IR (ROXICODONE) 10 MG tablet      Allergies   Allergen Reactions    Contrast Dye      Hives and breathing issues    Fish-Derived Products Hives    Seafood Hives    Adhesive Tape Hives     Primipore dressing causes hives    Gadolinium     Iodinated Contrast Media      Family History  Family History    Problem Relation Age of Onset    Sickle Cell Trait Mother     Hypertension Mother     Asthma Mother     Sickle Cell Trait Father     Glaucoma No family hx of     Macular Degeneration No family hx of     Diabetes No family hx of     Gout No family hx of      Social History   Social History     Tobacco Use    Smoking status: Never     Passive exposure: Never    Smokeless tobacco: Never   Substance Use Topics    Alcohol use: Not Currently     Alcohol/week: 0.0 standard drinks of alcohol    Drug use: Never         A medically appropriate review of systems was performed with pertinent positives and negatives noted in the HPI, and all other systems negative.    Physical Exam   BP: 112/71  Pulse: 99  Temp: 98.3  F (36.8  C)  Resp: 16  SpO2: 98 %  Physical Exam  Constitutional:       General: She is in acute distress.      Appearance: She is ill-appearing. She is not diaphoretic.   HENT:      Head: Normocephalic and atraumatic.      Right Ear: External ear normal.      Left Ear: External ear normal.      Nose: Nose normal.   Eyes:      Extraocular Movements: Extraocular movements intact.      Conjunctiva/sclera: Conjunctivae normal.   Cardiovascular:      Rate and Rhythm: Normal rate and regular rhythm.      Heart sounds: Normal heart sounds.   Pulmonary:      Effort: Pulmonary effort is normal. No respiratory distress.      Breath sounds: Normal breath sounds.   Abdominal:      General: There is no distension.      Palpations: Abdomen is soft.      Tenderness: There is no abdominal tenderness.   Musculoskeletal:         General: No swelling or deformity.      Cervical back: Normal range of motion and neck supple.   Skin:     General: Skin is warm and dry.   Neurological:      Mental Status: Mental status is at baseline.      Comments: Alert, oriented   Psychiatric:         Mood and Affect: Mood normal.         Behavior: Behavior normal.         ED Course, Procedures, & Data      Procedures              Results for orders  placed or performed during the hospital encounter of 12/16/23   Basic metabolic panel     Status: Normal   Result Value Ref Range    Sodium 136 135 - 145 mmol/L    Potassium 4.1 3.4 - 5.3 mmol/L    Chloride 103 98 - 107 mmol/L    Carbon Dioxide (CO2) 26 22 - 29 mmol/L    Anion Gap 7 7 - 15 mmol/L    Urea Nitrogen 7.2 6.0 - 20.0 mg/dL    Creatinine 0.56 0.51 - 0.95 mg/dL    GFR Estimate >90 >60 mL/min/1.73m2    Calcium 8.8 8.6 - 10.0 mg/dL    Glucose 86 70 - 99 mg/dL   Reticulocyte count     Status: Abnormal   Result Value Ref Range    % Reticulocyte 16.1 (H) 0.5 - 2.0 %    Absolute Reticulocyte 0.390 (H) 0.025 - 0.095 10e6/uL   CBC with platelets and differential     Status: Abnormal   Result Value Ref Range    WBC Count 12.8 (H) 4.0 - 11.0 10e3/uL    RBC Count 2.43 (L) 3.80 - 5.20 10e6/uL    Hemoglobin 7.2 (L) 11.7 - 15.7 g/dL    Hematocrit 21.8 (L) 35.0 - 47.0 %    MCV 90 78 - 100 fL    MCH 29.6 26.5 - 33.0 pg    MCHC 33.0 31.5 - 36.5 g/dL    RDW 22.5 (H) 10.0 - 15.0 %    Platelet Count 430 150 - 450 10e3/uL    % Neutrophils 62 %    % Lymphocytes 23 %    % Monocytes 8 %    % Eosinophils 5 %    % Basophils 2 %    % Immature Granulocytes 0 %    NRBCs per 100 WBC 1 (H) <1 /100    Absolute Neutrophils 8.0 1.6 - 8.3 10e3/uL    Absolute Lymphocytes 2.9 0.8 - 5.3 10e3/uL    Absolute Monocytes 1.0 0.0 - 1.3 10e3/uL    Absolute Eosinophils 0.6 0.0 - 0.7 10e3/uL    Absolute Basophils 0.2 0.0 - 0.2 10e3/uL    Absolute Immature Granulocytes 0.1 <=0.4 10e3/uL    Absolute NRBCs 0.1 10e3/uL   CBC with platelets differential     Status: Abnormal    Narrative    The following orders were created for panel order CBC with platelets differential.  Procedure                               Abnormality         Status                     ---------                               -----------         ------                     CBC with platelets and d...[[id number]]  Abnormal            Final result                 Please view results for  these tests on the individual orders.          Results for orders placed or performed during the hospital encounter of 12/16/23   Basic metabolic panel     Status: Normal   Result Value Ref Range    Sodium 136 135 - 145 mmol/L    Potassium 4.1 3.4 - 5.3 mmol/L    Chloride 103 98 - 107 mmol/L    Carbon Dioxide (CO2) 26 22 - 29 mmol/L    Anion Gap 7 7 - 15 mmol/L    Urea Nitrogen 7.2 6.0 - 20.0 mg/dL    Creatinine 0.56 0.51 - 0.95 mg/dL    GFR Estimate >90 >60 mL/min/1.73m2    Calcium 8.8 8.6 - 10.0 mg/dL    Glucose 86 70 - 99 mg/dL   Reticulocyte count     Status: Abnormal   Result Value Ref Range    % Reticulocyte 16.1 (H) 0.5 - 2.0 %    Absolute Reticulocyte 0.390 (H) 0.025 - 0.095 10e6/uL   CBC with platelets and differential     Status: Abnormal   Result Value Ref Range    WBC Count 12.8 (H) 4.0 - 11.0 10e3/uL    RBC Count 2.43 (L) 3.80 - 5.20 10e6/uL    Hemoglobin 7.2 (L) 11.7 - 15.7 g/dL    Hematocrit 21.8 (L) 35.0 - 47.0 %    MCV 90 78 - 100 fL    MCH 29.6 26.5 - 33.0 pg    MCHC 33.0 31.5 - 36.5 g/dL    RDW 22.5 (H) 10.0 - 15.0 %    Platelet Count 430 150 - 450 10e3/uL    % Neutrophils 62 %    % Lymphocytes 23 %    % Monocytes 8 %    % Eosinophils 5 %    % Basophils 2 %    % Immature Granulocytes 0 %    NRBCs per 100 WBC 1 (H) <1 /100    Absolute Neutrophils 8.0 1.6 - 8.3 10e3/uL    Absolute Lymphocytes 2.9 0.8 - 5.3 10e3/uL    Absolute Monocytes 1.0 0.0 - 1.3 10e3/uL    Absolute Eosinophils 0.6 0.0 - 0.7 10e3/uL    Absolute Basophils 0.2 0.0 - 0.2 10e3/uL    Absolute Immature Granulocytes 0.1 <=0.4 10e3/uL    Absolute NRBCs 0.1 10e3/uL   CBC with platelets differential     Status: Abnormal    Narrative    The following orders were created for panel order CBC with platelets differential.  Procedure                               Abnormality         Status                     ---------                               -----------         ------                     CBC with platelets and d...[[id number]]  Abnormal             Final result                 Please view results for these tests on the individual orders.     Medications   sodium chloride 0.9% BOLUS 1,000 mL (1,000 mLs Intravenous $New Bag 12/16/23 0301)   ondansetron (ZOFRAN) injection 4 mg (4 mg Intravenous $Given 12/16/23 0301)   HYDROmorphone (DILAUDID) injection 1 mg (1 mg Intravenous $Given 12/16/23 0302)   sodium chloride (PF) 0.9% PF flush 10-20 mL (has no administration in time range)   sodium chloride (PF) 0.9% PF flush 10-20 mL (has no administration in time range)   sodium chloride (PF) 0.9% PF flush 10-20 mL (has no administration in time range)   heparin lock flush 10 UNIT/ML injection 5-10 mL (has no administration in time range)   heparin lock flush 10 UNIT/ML injection 5-10 mL (has no administration in time range)   heparin 100 unit/mL injection 5-10 mL (has no administration in time range)   ketorolac (TORADOL) injection 15 mg (15 mg Intravenous $Given 12/16/23 0302)     Labs Ordered and Resulted from Time of ED Arrival to Time of ED Departure   RETICULOCYTE COUNT - Abnormal       Result Value    % Reticulocyte 16.1 (*)     Absolute Reticulocyte 0.390 (*)    CBC WITH PLATELETS AND DIFFERENTIAL - Abnormal    WBC Count 12.8 (*)     RBC Count 2.43 (*)     Hemoglobin 7.2 (*)     Hematocrit 21.8 (*)     MCV 90      MCH 29.6      MCHC 33.0      RDW 22.5 (*)     Platelet Count 430      % Neutrophils 62      % Lymphocytes 23      % Monocytes 8      % Eosinophils 5      % Basophils 2      % Immature Granulocytes 0      NRBCs per 100 WBC 1 (*)     Absolute Neutrophils 8.0      Absolute Lymphocytes 2.9      Absolute Monocytes 1.0      Absolute Eosinophils 0.6      Absolute Basophils 0.2      Absolute Immature Granulocytes 0.1      Absolute NRBCs 0.1     BASIC METABOLIC PANEL - Normal    Sodium 136      Potassium 4.1      Chloride 103      Carbon Dioxide (CO2) 26      Anion Gap 7      Urea Nitrogen 7.2      Creatinine 0.56      GFR Estimate >90      Calcium 8.8       Glucose 86       No orders to display       Medical Decision Making  The patient's presentation is strongly suggestive of high complexity (a chronic illness severe exacerbation, progression, or side effect of treatment).    The patient's evaluation involved:  review of external note(s) from 3+ sources (Most recent H&P in addition to clinic and ED note)  review of 2 test result(s) ordered prior to this encounter (Most recent BMP and CBC)    The patient's management involved high risk (a parenteral controlled substance).    Assessment & Plan    43-year-old female presents to us with a chief complaint of sickle cell pain crisis.  Labs today are stable.  Patient was given her usual pain control protocol.  She is feeling better.  Will discharge her    I have reviewed the nursing notes. I have reviewed the findings, diagnosis, plan and need for follow up with the patient.    New Prescriptions    No medications on file       Final diagnoses:   Sickle cell pain crisis (H)         [facility] EMERGENCY DEPARTMENT  12/16/2023     Mykel Luz, DO  12/16/23 0614

## 2023-12-18 ENCOUNTER — PATIENT OUTREACH (OUTPATIENT)
Dept: CARE COORDINATION | Facility: CLINIC | Age: 23
End: 2023-12-18
Payer: COMMERCIAL

## 2023-12-18 ENCOUNTER — INFUSION THERAPY VISIT (OUTPATIENT)
Dept: ONCOLOGY | Facility: CLINIC | Age: 23
End: 2023-12-18
Attending: PEDIATRICS
Payer: COMMERCIAL

## 2023-12-18 ENCOUNTER — NURSE TRIAGE (OUTPATIENT)
Dept: ONCOLOGY | Facility: CLINIC | Age: 23
End: 2023-12-18
Payer: COMMERCIAL

## 2023-12-18 VITALS
SYSTOLIC BLOOD PRESSURE: 110 MMHG | HEART RATE: 82 BPM | RESPIRATION RATE: 16 BRPM | TEMPERATURE: 98.9 F | OXYGEN SATURATION: 98 % | DIASTOLIC BLOOD PRESSURE: 68 MMHG

## 2023-12-18 DIAGNOSIS — G81.10 SPASTIC HEMIPLEGIA, UNSPECIFIED ETIOLOGY, UNSPECIFIED LATERALITY (H): ICD-10-CM

## 2023-12-18 DIAGNOSIS — D57.00 SICKLE CELL PAIN CRISIS (H): Primary | ICD-10-CM

## 2023-12-18 PROCEDURE — 96374 THER/PROPH/DIAG INJ IV PUSH: CPT

## 2023-12-18 PROCEDURE — 250N000011 HC RX IP 250 OP 636: Performed by: PEDIATRICS

## 2023-12-18 PROCEDURE — 258N000003 HC RX IP 258 OP 636: Performed by: PEDIATRICS

## 2023-12-18 PROCEDURE — 96361 HYDRATE IV INFUSION ADD-ON: CPT

## 2023-12-18 PROCEDURE — 96375 TX/PRO/DX INJ NEW DRUG ADDON: CPT

## 2023-12-18 PROCEDURE — 96376 TX/PRO/DX INJ SAME DRUG ADON: CPT

## 2023-12-18 PROCEDURE — 250N000013 HC RX MED GY IP 250 OP 250 PS 637: Performed by: PEDIATRICS

## 2023-12-18 RX ORDER — HEPARIN SODIUM,PORCINE 10 UNIT/ML
5 VIAL (ML) INTRAVENOUS
Status: CANCELLED | OUTPATIENT
Start: 2024-01-01

## 2023-12-18 RX ORDER — KETOROLAC TROMETHAMINE 30 MG/ML
30 INJECTION, SOLUTION INTRAMUSCULAR; INTRAVENOUS ONCE
Status: CANCELLED
Start: 2024-01-01 | End: 2024-01-01

## 2023-12-18 RX ORDER — KETOROLAC TROMETHAMINE 30 MG/ML
30 INJECTION, SOLUTION INTRAMUSCULAR; INTRAVENOUS ONCE
Status: COMPLETED | OUTPATIENT
Start: 2023-12-18 | End: 2023-12-18

## 2023-12-18 RX ORDER — ONDANSETRON 4 MG/1
8 TABLET, ORALLY DISINTEGRATING ORAL
Status: COMPLETED | OUTPATIENT
Start: 2023-12-18 | End: 2023-12-18

## 2023-12-18 RX ORDER — DIPHENHYDRAMINE HCL 25 MG
25 CAPSULE ORAL
Status: COMPLETED | OUTPATIENT
Start: 2023-12-18 | End: 2023-12-18

## 2023-12-18 RX ORDER — DIPHENHYDRAMINE HCL 25 MG
25 CAPSULE ORAL
Status: CANCELLED
Start: 2024-01-01

## 2023-12-18 RX ORDER — HEPARIN SODIUM (PORCINE) LOCK FLUSH IV SOLN 100 UNIT/ML 100 UNIT/ML
5 SOLUTION INTRAVENOUS
Status: CANCELLED | OUTPATIENT
Start: 2024-01-01

## 2023-12-18 RX ORDER — ONDANSETRON 4 MG/1
8 TABLET, FILM COATED ORAL
Status: CANCELLED
Start: 2024-01-01

## 2023-12-18 RX ORDER — HEPARIN SODIUM (PORCINE) LOCK FLUSH IV SOLN 100 UNIT/ML 100 UNIT/ML
5 SOLUTION INTRAVENOUS
Status: DISCONTINUED | OUTPATIENT
Start: 2023-12-18 | End: 2023-12-18 | Stop reason: HOSPADM

## 2023-12-18 RX ADMIN — HYDROMORPHONE HYDROCHLORIDE 1 MG: 1 INJECTION, SOLUTION INTRAMUSCULAR; INTRAVENOUS; SUBCUTANEOUS at 15:30

## 2023-12-18 RX ADMIN — Medication 5 ML: at 15:32

## 2023-12-18 RX ADMIN — HYDROMORPHONE HYDROCHLORIDE 1 MG: 1 INJECTION, SOLUTION INTRAMUSCULAR; INTRAVENOUS; SUBCUTANEOUS at 14:31

## 2023-12-18 RX ADMIN — DIPHENHYDRAMINE HYDROCHLORIDE 25 MG: 25 CAPSULE ORAL at 13:29

## 2023-12-18 RX ADMIN — KETOROLAC TROMETHAMINE 30 MG: 30 INJECTION, SOLUTION INTRAMUSCULAR; INTRAVENOUS at 13:29

## 2023-12-18 RX ADMIN — SODIUM CHLORIDE, POTASSIUM CHLORIDE, SODIUM LACTATE AND CALCIUM CHLORIDE 1000 ML: 600; 310; 30; 20 INJECTION, SOLUTION INTRAVENOUS at 13:26

## 2023-12-18 RX ADMIN — ONDANSETRON 8 MG: 4 TABLET, ORALLY DISINTEGRATING ORAL at 13:35

## 2023-12-18 RX ADMIN — HYDROMORPHONE HYDROCHLORIDE 1 MG: 1 INJECTION, SOLUTION INTRAMUSCULAR; INTRAVENOUS; SUBCUTANEOUS at 13:28

## 2023-12-18 NOTE — TELEPHONE ENCOUNTER
Available appt with [facility] at 1330. Call to pt to offer appt. Pt confirmed she is able to make 1330 appt. Message sent to [facility] to add on to schedule and SW to help with transportation.   
Call to pt to offer 0930 appt. Pt states she will keep 1330 appt.   
Oncology Nurse Triage - Sickle Cell Pain Crisis:    Situation: Jennifer  calling about Sickle Cell Pain Crisis, requesting to be added on for IV fluids and pain medicine    Background:     Patient's last infusion was: 12/16/23  Last clinic visit date:11/27/23 w/Patricia Mantilla  Does patient have active treatment plan?  Yes      Assessment of Symptoms:  Onset/Duration of symptoms: 1 day    Is it typical sickle cell pain? Yes   Location: Left arm  Character: Sharp           Intensity: 9/10    Any radiation of pain, numbness, tingling, weakness, warmth, swelling, discoloration of arms or legs?No     Fever?No  (if yes max temperature recorded in last 24 hours):      Chest Pain Present: No     Shortness of breath: No     Other home therapies tried: HEAT/HEATING PAD     Last home medication taken and when: oxycodone, ibuprofen, muscle relaxer 0600    Any Refills Needed?: No     Does patient have transportation & length of time to get to clinic: If opening early, she can find transportation, 15-20 min. If later on in the day she will need help getting transportation.          Recommendations:   If you do not hear from the infusion center by 2pm then you will not be able to get in for an infusion today. If symptoms worsen while waiting for call back, and/or you experience fever, chills, SOB, chest pain, cough, n/v, dizziness, numbness, swelling, discoloration of extremities, then seek emergency evaluation in Emergency Department.     Please note, if you are late for your appt, you risk losing your infusion appt as it may delay another patient's infusion who arrived on time.     Secure chat to Patricia Mantilla for approval      0718 Patricia approving adding pt on wait list for IVF/Pain meds.            
No

## 2023-12-18 NOTE — PATIENT INSTRUCTIONS
[facility] Triage and after hours / weekends / holidays:  [phone] option 5, option 2    Please call the triage or after hours line if you experience a temperature greater than or equal to 100.4, shaking chills, have uncontrolled nausea, vomiting and/or diarrhea, dizziness, shortness of breath, chest pain, bleeding, unexplained bruising, or if you have any other new/concerning symptoms, questions or concerns.      If you are having any concerning symptoms or wish to speak to a provider before your next infusion visit, please call triage to notify your care team so we can adequately serve you.     If you need a refill on a narcotic prescription or other medication, please call before your infusion appointment.

## 2023-12-18 NOTE — PROGRESS NOTES
Infusion Nursing Note:  Jennifer Cervantes presents today for IV Fluids/Pain Meds.    Patient seen by provider today: No   present during visit today: Not Applicable.    Note: Jennifer denied fevers, chills, cough, SOB, and chest pain. She reported 9/10 left arm pain upon arrival to infusion. She also has back and leg pain however these are less severe. He biggest complaint is the arm pain. She reports this pain has been ongoing for a few weeks now. At times the pain is severe enough that she is unable to move her arm, requiring her mom to feed her and for her to call off work.     She denied any injury to her arm. Denied fevers, warmth, redness, or swelling of the arm.     Notified Patricia Mantilla CNP of ongoing arm pain. Discussed patient's symptoms including that she has been either seen in the infusion center or the ED for pain every 2-3 days since at least the beginning of December.    Per written communication with Patricia Mantilla CNP/Tammie Hale RN 12/18/23 @ 4488  - I don't really think we need an US of arm  - And it doesn't sound focal enough to x-ray it  - hopefully additional pain medications will help    Encouraged patient to continue to monitor her arm pain and call triage for possible IV fluids/pain meds if needed. Patient has follow up visit with Patricia Mantilla CNP on 12/28.    Intravenous Access:  Implanted Port.    Treatment Conditions:  Not Applicable.      Post Infusion Assessment:  Patient tolerated infusion without incident.  Blood return noted pre and post infusion.  Site patent and intact, free from redness, edema or discomfort.  No evidence of extravasations.  Access discontinued per protocol.       Discharge Plan:   Discharge instructions reviewed with: Patient.  Patient and/or family verbalized understanding of discharge instructions and all questions answered.  AVS to patient via [employer].  Patient will return 12/28 for next appointment.   Patient discharged in stable condition accompanied  by: self.  Departure Mode: Ambulatory.      Earnestine Hale RN

## 2023-12-18 NOTE — PROGRESS NOTES
Social Work - Transportation  [facility]    Data/Intervention:  Patient Name: Jennifer Cervantes Goes By: Jennifer    /Age: 2000 (23 year old)    Referral From: [facility]  Reason for Referral:  support requested for patient transportation needs for today's appointment.  Assessment:  called [employer] to arrange ride through patient's insurance. [employer] arranged  for patient from home with [employer]. Patient will need to call [phone] when ready for return ride home.  Plan: Patient is aware of the transportation plan.  available to assist with any other needs.    CARLOS Chavez,LGUDAY  Hematology/Oncology Social Worker  Phone:[phone] Pager: [phone]

## 2023-12-20 ENCOUNTER — PATIENT OUTREACH (OUTPATIENT)
Dept: CARE COORDINATION | Facility: CLINIC | Age: 23
End: 2023-12-20
Payer: COMMERCIAL

## 2023-12-20 ENCOUNTER — NURSE TRIAGE (OUTPATIENT)
Dept: ONCOLOGY | Facility: CLINIC | Age: 23
End: 2023-12-20
Payer: COMMERCIAL

## 2023-12-20 ENCOUNTER — INFUSION THERAPY VISIT (OUTPATIENT)
Dept: ONCOLOGY | Facility: CLINIC | Age: 23
End: 2023-12-20
Attending: PEDIATRICS
Payer: COMMERCIAL

## 2023-12-20 ENCOUNTER — ANCILLARY PROCEDURE (OUTPATIENT)
Dept: ULTRASOUND IMAGING | Facility: CLINIC | Age: 23
End: 2023-12-20
Attending: REGISTERED NURSE
Payer: COMMERCIAL

## 2023-12-20 VITALS
HEART RATE: 83 BPM | RESPIRATION RATE: 16 BRPM | TEMPERATURE: 98.2 F | SYSTOLIC BLOOD PRESSURE: 115 MMHG | WEIGHT: 147.7 LBS | BODY MASS INDEX: 25.35 KG/M2 | OXYGEN SATURATION: 99 % | DIASTOLIC BLOOD PRESSURE: 79 MMHG

## 2023-12-20 DIAGNOSIS — M79.602 PAIN OF LEFT UPPER EXTREMITY: ICD-10-CM

## 2023-12-20 DIAGNOSIS — G81.10 SPASTIC HEMIPLEGIA, UNSPECIFIED ETIOLOGY, UNSPECIFIED LATERALITY (H): ICD-10-CM

## 2023-12-20 DIAGNOSIS — D57.00 SICKLE CELL PAIN CRISIS (H): Primary | ICD-10-CM

## 2023-12-20 DIAGNOSIS — M79.602 PAIN OF LEFT UPPER EXTREMITY: Primary | ICD-10-CM

## 2023-12-20 LAB
BASOPHILS # BLD AUTO: 0.1 10E3/UL (ref 0–0.2)
BASOPHILS NFR BLD AUTO: 1 %
EOSINOPHIL # BLD AUTO: 0.4 10E3/UL (ref 0–0.7)
EOSINOPHIL NFR BLD AUTO: 3 %
ERYTHROCYTE [DISTWIDTH] IN BLOOD BY AUTOMATED COUNT: 22.2 % (ref 10–15)
HCT VFR BLD AUTO: 22.5 % (ref 35–47)
HGB BLD-MCNC: 7.9 G/DL (ref 11.7–15.7)
IMM GRANULOCYTES # BLD: 0 10E3/UL
IMM GRANULOCYTES NFR BLD: 0 %
LYMPHOCYTES # BLD AUTO: 1.9 10E3/UL (ref 0.8–5.3)
LYMPHOCYTES NFR BLD AUTO: 17 %
MCH RBC QN AUTO: 29.9 PG (ref 26.5–33)
MCHC RBC AUTO-ENTMCNC: 35.1 G/DL (ref 31.5–36.5)
MCV RBC AUTO: 85 FL (ref 78–100)
MONOCYTES # BLD AUTO: 0.8 10E3/UL (ref 0–1.3)
MONOCYTES NFR BLD AUTO: 7 %
NEUTROPHILS # BLD AUTO: 7.9 10E3/UL (ref 1.6–8.3)
NEUTROPHILS NFR BLD AUTO: 72 %
NRBC # BLD AUTO: 0.1 10E3/UL
NRBC BLD AUTO-RTO: 1 /100
PLATELET # BLD AUTO: 430 10E3/UL (ref 150–450)
RBC # BLD AUTO: 2.64 10E6/UL (ref 3.8–5.2)
WBC # BLD AUTO: 11.1 10E3/UL (ref 4–11)

## 2023-12-20 PROCEDURE — 85041 AUTOMATED RBC COUNT: CPT | Performed by: PEDIATRICS

## 2023-12-20 PROCEDURE — 96375 TX/PRO/DX INJ NEW DRUG ADDON: CPT

## 2023-12-20 PROCEDURE — 96361 HYDRATE IV INFUSION ADD-ON: CPT

## 2023-12-20 PROCEDURE — 250N000011 HC RX IP 250 OP 636: Performed by: PEDIATRICS

## 2023-12-20 PROCEDURE — 96376 TX/PRO/DX INJ SAME DRUG ADON: CPT

## 2023-12-20 PROCEDURE — 93971 EXTREMITY STUDY: CPT | Mod: LT | Performed by: RADIOLOGY

## 2023-12-20 PROCEDURE — 36591 DRAW BLOOD OFF VENOUS DEVICE: CPT | Performed by: PEDIATRICS

## 2023-12-20 PROCEDURE — 96374 THER/PROPH/DIAG INJ IV PUSH: CPT

## 2023-12-20 PROCEDURE — 85018 HEMOGLOBIN: CPT | Performed by: PEDIATRICS

## 2023-12-20 PROCEDURE — 258N000003 HC RX IP 258 OP 636: Performed by: PEDIATRICS

## 2023-12-20 RX ORDER — HEPARIN SODIUM (PORCINE) LOCK FLUSH IV SOLN 100 UNIT/ML 100 UNIT/ML
5 SOLUTION INTRAVENOUS
Status: CANCELLED | OUTPATIENT
Start: 2024-01-01

## 2023-12-20 RX ORDER — ONDANSETRON 8 MG/1
8 TABLET, ORALLY DISINTEGRATING ORAL
Status: COMPLETED | OUTPATIENT
Start: 2023-12-20 | End: 2023-12-20

## 2023-12-20 RX ORDER — ONDANSETRON 4 MG/1
8 TABLET, FILM COATED ORAL
Status: CANCELLED
Start: 2024-01-01

## 2023-12-20 RX ORDER — DIPHENHYDRAMINE HCL 25 MG
25 CAPSULE ORAL
Status: CANCELLED
Start: 2024-01-01

## 2023-12-20 RX ORDER — KETOROLAC TROMETHAMINE 30 MG/ML
30 INJECTION, SOLUTION INTRAMUSCULAR; INTRAVENOUS ONCE
Status: CANCELLED
Start: 2024-01-01 | End: 2024-01-01

## 2023-12-20 RX ORDER — HEPARIN SODIUM (PORCINE) LOCK FLUSH IV SOLN 100 UNIT/ML 100 UNIT/ML
5 SOLUTION INTRAVENOUS
Status: DISCONTINUED | OUTPATIENT
Start: 2023-12-20 | End: 2023-12-20 | Stop reason: HOSPADM

## 2023-12-20 RX ORDER — HEPARIN SODIUM,PORCINE 10 UNIT/ML
5 VIAL (ML) INTRAVENOUS
Status: CANCELLED | OUTPATIENT
Start: 2024-01-01

## 2023-12-20 RX ORDER — KETOROLAC TROMETHAMINE 30 MG/ML
30 INJECTION, SOLUTION INTRAMUSCULAR; INTRAVENOUS ONCE
Status: COMPLETED | OUTPATIENT
Start: 2023-12-20 | End: 2023-12-20

## 2023-12-20 RX ADMIN — HYDROMORPHONE HYDROCHLORIDE 1 MG: 1 INJECTION, SOLUTION INTRAMUSCULAR; INTRAVENOUS; SUBCUTANEOUS at 13:59

## 2023-12-20 RX ADMIN — HYDROMORPHONE HYDROCHLORIDE 1 MG: 1 INJECTION, SOLUTION INTRAMUSCULAR; INTRAVENOUS; SUBCUTANEOUS at 15:59

## 2023-12-20 RX ADMIN — SODIUM CHLORIDE, POTASSIUM CHLORIDE, SODIUM LACTATE AND CALCIUM CHLORIDE 1000 ML: 600; 310; 30; 20 INJECTION, SOLUTION INTRAVENOUS at 13:56

## 2023-12-20 RX ADMIN — ONDANSETRON 8 MG: 8 TABLET, ORALLY DISINTEGRATING ORAL at 13:57

## 2023-12-20 RX ADMIN — Medication 5 ML: at 16:01

## 2023-12-20 RX ADMIN — HYDROMORPHONE HYDROCHLORIDE 1 MG: 1 INJECTION, SOLUTION INTRAMUSCULAR; INTRAVENOUS; SUBCUTANEOUS at 15:00

## 2023-12-20 RX ADMIN — KETOROLAC TROMETHAMINE 30 MG: 30 INJECTION, SOLUTION INTRAMUSCULAR; INTRAVENOUS at 13:58

## 2023-12-20 ASSESSMENT — PAIN SCALES - GENERAL: PAINLEVEL: EXTREME PAIN (9)

## 2023-12-20 NOTE — PROGRESS NOTES
Patient reports persistent pain on left shoulder to wrist. States that she needed more help moving around at home(eating, dressing up). Missed some days at work due to pain. Denies swelling, fall and open areas. States pain is same from Monday not worsening. Patricia Mantilla NP noted.     Patient agreed with the plan as per provider instruction. Verbalized understanding.     TORB: 12/20/23/Patricia Mantilla NP/Dione Delarosa RN/ Please schedule Ultrasound of the extremity if she is agreeable.

## 2023-12-20 NOTE — TELEPHONE ENCOUNTER
Oncology Nurse Triage - Sickle Cell Pain Crisis:    Situation: Jennifer  calling about Sickle Cell Pain Crisis, requesting to be added on for IV fluids and pain medicine    Background:     Patient's last infusion was 12/18/23  Last clinic visit date:11/27/23 w/Patricia Mantilla  Does patient have active treatment plan?  Yes      Assessment of Symptoms:  Onset/Duration of symptoms: 1 day    Is it typical sickle cell pain? Yes   Location: Left arm  Character: Sharp           Intensity: 9/10    Any radiation of pain, numbness, tingling, weakness, warmth, swelling, discoloration of arms or legs?No     Fever?No  (if yes max temperature recorded in last 24 hours):      Chest Pain Present: No     Shortness of breath: No     Other home therapies tried: HEAT/HEATING PAD and hot showers      Last home medication taken and when: Oxycodone, ibuprofen, muscle relaxer 0600    Any Refills Needed?: No     Does patient have transportation & length of time to get to clinic: No         Recommendations:   If you do not hear from the infusion center by 2pm then you will not be able to get in for an infusion today. If symptoms worsen while waiting for call back, and/or you experience fever, chills, SOB, chest pain, cough, n/v, dizziness, numbness, swelling, discoloration of extremities, then seek emergency evaluation in Emergency Department.     Added to infusion wait list for IVF/pain meds per protocol.

## 2023-12-20 NOTE — PROGRESS NOTES
Social Work - Transportation  [facility]    Data/Intervention:  Patient Name: Jennifer Cervantes Goes By: Jennifer    /Age: 2000 (23 year old)    Referral From: [facility]   Reason for Referral:  support requested for patient transportation needs for today's appointment.  Assessment:  called [employer] to arrange ride through patient's insurance. [employer] arranged  for patient from home with [employer]. Patient will need to call [phone] when ready for return ride home.  Plan: Patient is aware of the transportation plan.  available to assist with any other needs.    CARLOS Chavez,UDAY  Hematology/Oncology Social Worker  Phone:[phone] Pager: [phone]

## 2023-12-20 NOTE — TELEPHONE ENCOUNTER
Time available at 1330 with [facility]. Call to pt to verify she is able to make appt. Pt confirmed she is able to make appt. Message sent to [facility] to add to schedule and SW to help with transport.

## 2023-12-20 NOTE — PROGRESS NOTES
Infusion Nursing Note:  Jennifer Cervantes presents today for IVF and pain medications.    Patient seen by provider today: No   present during visit today: Not Applicable.    Note: Patient presents in infusion for IVF and pain medications. Patients states feeling nauseated and more fatigued in the previous three days. Provider notified and CBC drawn per provider preference. At the time of discharge, patient rates her pain at 4/10 and is comfortable going home.      Intravenous Access:  Implanted Port.    Treatment Conditions:  Not Applicable.      Post Infusion Assessment:  Patient tolerated infusion without incident.  Blood return noted pre and post infusion.  Site patent and intact, free from redness, edema or discomfort.  No evidence of extravasations.  Access discontinued per protocol.       Discharge Plan:   Patient declined prescription refills.  Patient and/or family verbalized understanding of discharge instructions and all questions answered.  Copy of AVS reviewed with patient and/or family.  Patient will return 12/28/23 for next appointment.  AVS to patient via [employer].  Patient will return 12/28/23 for next appointment.   Patient discharged in stable condition accompanied by: self. Transport arranged by .  Departure Mode: Ambulatory.      Stan Siddiqui RN

## 2023-12-22 ENCOUNTER — HOSPITAL ENCOUNTER (EMERGENCY)
Facility: CLINIC | Age: 23
Discharge: HOME | End: 2023-12-23
Attending: EMERGENCY MEDICINE | Admitting: EMERGENCY MEDICINE
Payer: COMMERCIAL

## 2023-12-22 DIAGNOSIS — D57.00 SICKLE CELL PAIN CRISIS (H): ICD-10-CM

## 2023-12-22 LAB
BASOPHILS # BLD AUTO: 0.2 10E3/UL (ref 0–0.2)
BASOPHILS NFR BLD AUTO: 2 %
EOSINOPHIL # BLD AUTO: 0.6 10E3/UL (ref 0–0.7)
EOSINOPHIL NFR BLD AUTO: 5 %
ERYTHROCYTE [DISTWIDTH] IN BLOOD BY AUTOMATED COUNT: 21.9 % (ref 10–15)
HCT VFR BLD AUTO: 22.5 % (ref 35–47)
HGB BLD-MCNC: 7.7 G/DL (ref 11.7–15.7)
IMM GRANULOCYTES # BLD: 0.1 10E3/UL
IMM GRANULOCYTES NFR BLD: 1 %
LYMPHOCYTES # BLD AUTO: 2.6 10E3/UL (ref 0.8–5.3)
LYMPHOCYTES NFR BLD AUTO: 19 %
MCH RBC QN AUTO: 29.7 PG (ref 26.5–33)
MCHC RBC AUTO-ENTMCNC: 34.2 G/DL (ref 31.5–36.5)
MCV RBC AUTO: 87 FL (ref 78–100)
MONOCYTES # BLD AUTO: 1 10E3/UL (ref 0–1.3)
MONOCYTES NFR BLD AUTO: 7 %
NEUTROPHILS # BLD AUTO: 9.1 10E3/UL (ref 1.6–8.3)
NEUTROPHILS NFR BLD AUTO: 66 %
NRBC # BLD AUTO: 0.2 10E3/UL
NRBC BLD AUTO-RTO: 1 /100
PLATELET # BLD AUTO: 394 10E3/UL (ref 150–450)
RBC # BLD AUTO: 2.59 10E6/UL (ref 3.8–5.2)
RETICS # AUTO: 0.41 10E6/UL (ref 0.03–0.1)
RETICS/RBC NFR AUTO: 15.3 % (ref 0.5–2)
WBC # BLD AUTO: 13.6 10E3/UL (ref 4–11)

## 2023-12-22 PROCEDURE — 85045 AUTOMATED RETICULOCYTE COUNT: CPT | Performed by: EMERGENCY MEDICINE

## 2023-12-22 PROCEDURE — 250N000011 HC RX IP 250 OP 636: Performed by: EMERGENCY MEDICINE

## 2023-12-22 PROCEDURE — 85025 COMPLETE CBC W/AUTO DIFF WBC: CPT | Performed by: EMERGENCY MEDICINE

## 2023-12-22 PROCEDURE — 36415 COLL VENOUS BLD VENIPUNCTURE: CPT | Performed by: EMERGENCY MEDICINE

## 2023-12-22 PROCEDURE — 258N000003 HC RX IP 258 OP 636: Performed by: EMERGENCY MEDICINE

## 2023-12-22 PROCEDURE — 99284 EMERGENCY DEPT VISIT MOD MDM: CPT | Mod: 25

## 2023-12-22 PROCEDURE — 96375 TX/PRO/DX INJ NEW DRUG ADDON: CPT

## 2023-12-22 PROCEDURE — 96361 HYDRATE IV INFUSION ADD-ON: CPT

## 2023-12-22 PROCEDURE — 96374 THER/PROPH/DIAG INJ IV PUSH: CPT

## 2023-12-22 PROCEDURE — 99285 EMERGENCY DEPT VISIT HI MDM: CPT | Performed by: EMERGENCY MEDICINE

## 2023-12-22 RX ORDER — ONDANSETRON 2 MG/ML
4 INJECTION INTRAMUSCULAR; INTRAVENOUS ONCE
Status: COMPLETED | OUTPATIENT
Start: 2023-12-22 | End: 2023-12-22

## 2023-12-22 RX ORDER — KETOROLAC TROMETHAMINE 30 MG/ML
30 INJECTION, SOLUTION INTRAMUSCULAR; INTRAVENOUS ONCE
Status: COMPLETED | OUTPATIENT
Start: 2023-12-22 | End: 2023-12-22

## 2023-12-22 RX ORDER — OXYCODONE HYDROCHLORIDE 10 MG/1
10 TABLET ORAL EVERY 4 HOURS PRN
Qty: 25 TABLET | Refills: 0 | Status: SHIPPED | OUTPATIENT
Start: 2023-12-22 | End: 2023-12-29

## 2023-12-22 RX ADMIN — ONDANSETRON 4 MG: 2 INJECTION INTRAMUSCULAR; INTRAVENOUS at 23:31

## 2023-12-22 RX ADMIN — KETOROLAC TROMETHAMINE 30 MG: 30 INJECTION, SOLUTION INTRAMUSCULAR at 23:31

## 2023-12-22 RX ADMIN — HYDROMORPHONE HYDROCHLORIDE 1 MG: 1 INJECTION, SOLUTION INTRAMUSCULAR; INTRAVENOUS; SUBCUTANEOUS at 23:30

## 2023-12-22 RX ADMIN — SODIUM CHLORIDE, POTASSIUM CHLORIDE, SODIUM LACTATE AND CALCIUM CHLORIDE 1000 ML: 600; 310; 30; 20 INJECTION, SOLUTION INTRAVENOUS at 23:30

## 2023-12-22 ASSESSMENT — ACTIVITIES OF DAILY LIVING (ADL): ADLS_ACUITY_SCORE: 33

## 2023-12-22 NOTE — TELEPHONE ENCOUNTER
Narcotic Refill Request    Medication(s) requested:  Oxycodone  Person Requesting Refill: Patient  What pain is the medication treating: Sickle cell pain  How is the medication being taken?: 1 tablet every 4 hrs  Does pt have enough for today? Will be out today  Is pain being adequately controlled on the current regimen?: Yes  Experiencing any side effects from medication?: No    Last clinic visit date:11/27/23 aida/Patricia Mantilla   Any No Show Visits: No  Next appointment:   12/28/23 w/Patricia Mantilla  Last fill date and by whom:  12/15/23 by Patricia Mantilla   Reviewed: No access    Routed provider: Patricia Mantilla

## 2023-12-22 NOTE — TELEPHONE ENCOUNTER
Jennifer calling in about her refill. Noted Patricia Lunaaislinn is not in today.   Will send to Dr Duncan   [facility] secure chat sent to Dr Duncan and Mandie Graham RNCC

## 2023-12-23 VITALS
TEMPERATURE: 97.5 F | RESPIRATION RATE: 18 BRPM | WEIGHT: 150 LBS | BODY MASS INDEX: 25.61 KG/M2 | DIASTOLIC BLOOD PRESSURE: 74 MMHG | HEART RATE: 82 BPM | OXYGEN SATURATION: 95 % | HEIGHT: 64 IN | SYSTOLIC BLOOD PRESSURE: 127 MMHG

## 2023-12-23 PROCEDURE — 250N000011 HC RX IP 250 OP 636: Performed by: EMERGENCY MEDICINE

## 2023-12-23 PROCEDURE — 96376 TX/PRO/DX INJ SAME DRUG ADON: CPT

## 2023-12-23 RX ORDER — HEPARIN SODIUM (PORCINE) LOCK FLUSH IV SOLN 100 UNIT/ML 100 UNIT/ML
5-10 SOLUTION INTRAVENOUS
Status: DISCONTINUED | OUTPATIENT
Start: 2023-12-23 | End: 2023-12-23 | Stop reason: HOSPADM

## 2023-12-23 RX ADMIN — HYDROMORPHONE HYDROCHLORIDE 1 MG: 1 INJECTION, SOLUTION INTRAMUSCULAR; INTRAVENOUS; SUBCUTANEOUS at 02:37

## 2023-12-23 RX ADMIN — HYDROMORPHONE HYDROCHLORIDE 1 MG: 1 INJECTION, SOLUTION INTRAMUSCULAR; INTRAVENOUS; SUBCUTANEOUS at 00:35

## 2023-12-23 RX ADMIN — SODIUM CHLORIDE, PRESERVATIVE FREE 5 ML: 5 INJECTION INTRAVENOUS at 04:24

## 2023-12-23 ASSESSMENT — ACTIVITIES OF DAILY LIVING (ADL)
ADLS_ACUITY_SCORE: 35
ADLS_ACUITY_SCORE: 35

## 2023-12-23 NOTE — ED TRIAGE NOTES
Increasing L arm, pain meds not effective     Triage Assessment (Adult)       Row Name 12/22/23 2392          Triage Assessment    Airway WDL WDL        Respiratory WDL    Respiratory WDL WDL        Skin Circulation/Temperature WDL    Skin Circulation/Temperature WDL WDL        Cardiac WDL    Cardiac WDL WDL        Peripheral/Neurovascular WDL    Peripheral Neurovascular WDL WDL        Cognitive/Neuro/Behavioral WDL    Cognitive/Neuro/Behavioral WDL WDL

## 2023-12-23 NOTE — ED PROVIDER NOTES
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 She states that she has been having pain over the past few weeks that radiates from her left shoulder down to her left wrist.  It is waxing and waning nature.  The pain is located \"on the inside.\"  She was previously able to control the pain with her home medications and she went to her pain infusion this past Wednesday.  However today she ran out of her home oxycodone, and she had to go out into the cold weather to get a refill.  She states that she last took her home oxycodone at 5 PM.  But at that point her pain had become so severe that she could not keep up.  She also took ibuprofen, muscle relaxer, use a heat pad, hot shower x 2.  She also took half tablet of oxycodone for breakthrough pain with no relief.    She denies any fevers, chills, chest pain, shortness of breath, numbness, tingling, weakness, trauma.  She does have chronic right sided deficits from previous CVA which are unchanged today.  She recently had a left upper extremity ultrasound that was negative for DVT.    Past Medical History  Past Medical History:   Diagnosis Date    Anxiety     Bleeding disorder (H24)     Blood clotting disorder (H24)     Cerebral infarction (H) 2015    Cognitive developmental delay     low IQ. 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 side (H)     right hand contractures    Iron overload due to repeated red blood cell transfusions     Migraines     " Multiple subsegmental pulmonary emboli without acute cor pulmonale (H) 02/01/2021    Oppositional defiant behavior     Presence of intrauterine contraceptive device 2/18/2020    Superficial venous thrombosis of arm, right 03/25/2021    Uncomplicated asthma      Past Surgical History:   Procedure Laterality Date    AS INSERT TUNNELED CV 2 CATH W/O PORT/PUMP      CHOLECYSTECTOMY      CV RIGHT HEART CATH MEASUREMENTS RECORDED N/A 11/18/2021    Procedure: Right Heart Cath;  Surgeon: Jackson Stauffer MD;  Location:  HEART CARDIAC CATH LAB    INSERT PORT VASCULAR ACCESS Left 4/21/2021    Procedure: INSERTION, VASCULAR ACCESS PORT (NOT SURE ON SIDE UNTIL REMOVAL);  Surgeon: Rajan More MD;  Location: [facility] OR    IR CHEST PORT PLACEMENT > 5 YRS OF AGE  4/21/2021    IR CVC NON TUNNEL LINE REMOVAL  5/6/2021    IR CVC NON TUNNEL PLACEMENT > 5 YRS  04/07/2020    IR CVC NON TUNNEL PLACEMENT > 5 YRS  4/30/2021    IR CVC NON TUNNEL PLACEMENT > 5 YRS  9/7/2022    IR PORT REMOVAL LEFT  4/21/2021    REMOVE PORT VASCULAR ACCESS Left 4/21/2021    Procedure: REMOVAL, VASCULAR ACCESS PORT LEFT;  Surgeon: Rajan More MD;  Location: [facility] OR    REPAIR TENDON ELBOW Right 10/02/2019    Procedure: Right Elbow Flexor Lengthening, Flexor Pronator Slide Of Wrist and Finger, Thumb Adductor Lengthening;  Surgeon: Anai Franco MD;  Location: [facility] OR    TONSILLECTOMY Bilateral 10/02/2019    Procedure: Bilateral Tonsillectomy;  Surgeon: Farhana Gyu MD;  Location: [facility] OR    ZZC BREAST REDUCTION (INCLUDES LIPO) TIER 3 Bilateral 04/23/2019     acetaminophen (TYLENOL) 325 MG tablet  albuterol (PROAIR HFA/PROVENTIL HFA/VENTOLIN HFA) 108 (90 Base) MCG/ACT inhaler  albuterol (PROVENTIL) (2.5 MG/3ML) 0.083% neb solution  aspirin (ASA) 81 MG chewable tablet  budesonide-formoterol (SYMBICORT) 160-4.5 MCG/ACT Inhaler  cetirizine (ZYRTEC) 10 MG tablet  deferasirox (JADENU) 360 MG tablet  diphenhydrAMINE (BENADRYL) 25 MG  "capsule  EPINEPHrine (ANY BX GENERIC EQUIV) 0.3 MG/0.3ML injection 2-pack  Hydroxyurea 1000 MG TABS  methocarbamol (ROBAXIN) 750 MG tablet  naloxone (NARCAN) 4 MG/0.1ML nasal spray  ondansetron (ZOFRAN) 8 MG tablet  oxyCODONE IR (ROXICODONE) 10 MG tablet      Allergies   Allergen Reactions    Contrast Dye      Hives and breathing issues    Fish-Derived Products Hives    Seafood Hives    Adhesive Tape Hives     Primipore dressing causes hives    Gadolinium     Iodinated Contrast Media      Family History  Family History   Problem Relation Age of Onset    Sickle Cell Trait Mother     Hypertension Mother     Asthma Mother     Sickle Cell Trait Father     Glaucoma No family hx of     Macular Degeneration No family hx of     Diabetes No family hx of     Gout No family hx of      Social History   Social History     Tobacco Use    Smoking status: Never     Passive exposure: Never    Smokeless tobacco: Never   Substance Use Topics    Alcohol use: Not Currently     Alcohol/week: 0.0 standard drinks of alcohol    Drug use: Never      Past medical history, past surgical history, medications, allergies, family history, and social history were reviewed with the patient. No additional pertinent items.      A medically appropriate review of systems was performed with pertinent positives and negatives noted in the HPI, and all other systems negative.    Physical Exam   BP: 122/77  Pulse: 87  Temp: 98.2  F (36.8  C)  Resp: 16  Height: 162.6 cm (5' 4\")  Weight: 68 kg (150 lb)  SpO2: 95 %  Physical Exam  General: No acute distress.  HENT: Normocephalic and atraumatic.   Eyes: EOMI. Conjunctivae normal.   Cardiovascular: Normal rate and regular rhythm.  Normal heart sounds. No murmur heard.  Pulmonary: No respiratory distress. Normal breath sounds.   Abdominal: There is no distension. Abdomen is soft. There is no mass. There is no abdominal tenderness.   Musculoskeletal: No swelling or tenderness.  Chronic right upper extremity " contracture.  Left upper extremity without reproducible tenderness.  Normal range of motion without difficulty.  2+ pulses.  Grossly intact strength and sensation of left upper extremity  Skin: Warm and dry  Neurological: No focal deficit present.   Mood and Affect: Mood normal.     ED Course, Procedures, & Data      Procedures            No results found for any visits on 12/22/23.  Medications - No data to display  Labs Ordered and Resulted from Time of ED Arrival to Time of ED Departure - No data to display  No orders to display          Critical care was not performed.     Medical Decision Making  The patient's presentation was of high complexity (a chronic illness severe exacerbation, progression, or side effect of treatment).    The patient's evaluation involved:  review of external note(s) from 3+ sources (see separate area of note for details)  review of 3+ test result(s) ordered prior to this encounter (see separate area of note for details)  ordering and/or review of 3+ test(s) in this encounter (see separate area of note for details)    The patient's management necessitated further care after sign-out to Dr. Diaz (see their note for further management).    Assessment & Plan    Patient arrives emergency department for chief complaint of sickle cell pain crisis located in the left shoulder radiating to the left wrist.  Recent ultrasound negative for DVT.      On exam, CMS intact.  No reproducible tenderness or deformities and no history of trauma.  Thus will not obtain any additional imaging today.  Patient was given pain control according to her ED care plan with Dilaudid, Toradol, lactated ringer, Zofran.  CBC with Hemoglobin 7.7, consistent with chronic anemia.  She has an appropriate rise in her reticulocytes.  Patient care signed out to oncoming physician to follow up on pain management and final disposition.      I have reviewed the nursing notes. I have reviewed the findings, diagnosis, plan and need  for follow up with the patient.    New Prescriptions    No medications on file       Final diagnoses:   None       [facility] EMERGENCY DEPARTMENT  12/22/2023     Jesica Michael MD  12/23/23 [id number]

## 2023-12-26 ENCOUNTER — NURSE TRIAGE (OUTPATIENT)
Dept: ONCOLOGY | Facility: CLINIC | Age: 23
End: 2023-12-26
Payer: COMMERCIAL

## 2023-12-26 NOTE — TELEPHONE ENCOUNTER
Oncology Nurse Triage - Sickle Cell Pain Crisis:    Situation: Jennifer  calling about Sickle Cell Pain Crisis, requesting to be added on for IV fluids and pain medicine    Background:     Patient's last infusion was 12/20/23 ER on 12/22/23  Last clinic visit date:11/27/23 Patricia Mantilla   Does patient have active treatment plan?  Yes      Assessment of Symptoms:  Onset/Duration of symptoms: 2 day    Is it typical sickle cell pain? Yes   Location: left arm   Character: Sharp           Intensity: 9/10    Any radiation of pain, numbness, tingling, weakness, warmth, swelling, discoloration of arms or legs?No     Fever?No  (if yes max temperature recorded in last 24 hours):      Chest Pain Present: No     Shortness of breath: No     Other home therapies tried: HEAT/HEATING PAD and WARM BATH     Last home medication taken and when: 6:00am oxycodone     Any Refills Needed?: No     Does patient have transportation & length of time to get to clinic: No needs transportation to the clinic         Recommendations:     Placed on infusion call list     If you do not hear from the infusion center by 2pm then you will not be able to get in for an infusion today. If symptoms worsen while waiting for call back, and/or you experience fever, chills, SOB, chest pain, cough, n/v, dizziness, numbness, swelling, discoloration of extremities, then seek emergency evaluation in Emergency Department.     Please note, if you are late for your appt, you risk losing your infusion appt as it may delay another patient's infusion who arrived on time.

## 2023-12-27 ENCOUNTER — NURSE TRIAGE (OUTPATIENT)
Dept: ONCOLOGY | Facility: CLINIC | Age: 23
End: 2023-12-27
Payer: COMMERCIAL

## 2023-12-27 RX ORDER — HEPARIN SODIUM,PORCINE 10 UNIT/ML
5 VIAL (ML) INTRAVENOUS
Status: CANCELLED | OUTPATIENT
Start: 2023-12-28

## 2023-12-27 RX ORDER — METHYLPREDNISOLONE SODIUM SUCCINATE 125 MG/2ML
125 INJECTION, POWDER, LYOPHILIZED, FOR SOLUTION INTRAMUSCULAR; INTRAVENOUS
Status: CANCELLED
Start: 2023-12-28

## 2023-12-27 RX ORDER — ALBUTEROL SULFATE 90 UG/1
1-2 AEROSOL, METERED RESPIRATORY (INHALATION)
Status: CANCELLED
Start: 2023-12-28

## 2023-12-27 RX ORDER — EPINEPHRINE 1 MG/ML
0.3 INJECTION, SOLUTION INTRAMUSCULAR; SUBCUTANEOUS EVERY 5 MIN PRN
Status: CANCELLED | OUTPATIENT
Start: 2023-12-28

## 2023-12-27 RX ORDER — HEPARIN SODIUM (PORCINE) LOCK FLUSH IV SOLN 100 UNIT/ML 100 UNIT/ML
5 SOLUTION INTRAVENOUS
Status: CANCELLED | OUTPATIENT
Start: 2023-12-28

## 2023-12-27 RX ORDER — MEPERIDINE HYDROCHLORIDE 25 MG/ML
25 INJECTION INTRAMUSCULAR; INTRAVENOUS; SUBCUTANEOUS EVERY 30 MIN PRN
Status: CANCELLED | OUTPATIENT
Start: 2023-12-28

## 2023-12-27 RX ORDER — DIPHENHYDRAMINE HYDROCHLORIDE 50 MG/ML
50 INJECTION INTRAMUSCULAR; INTRAVENOUS
Status: CANCELLED
Start: 2023-12-28

## 2023-12-27 RX ORDER — ALBUTEROL SULFATE 0.83 MG/ML
2.5 SOLUTION RESPIRATORY (INHALATION)
Status: CANCELLED | OUTPATIENT
Start: 2023-12-28

## 2023-12-27 NOTE — TELEPHONE ENCOUNTER
Oncology Nurse Triage - Sickle Cell Pain Crisis:    Situation: Jennifer  calling about Sickle Cell Pain Crisis, requesting to be added on for IV fluids and pain medicine    Background:     Patient's last infusion was 12/20ER  Last clinic visit date:11/27/23  Does patient have active treatment plan?  Yes      Assessment of Symptoms:  Onset/Duration of symptoms: 2 day    Is it typical sickle cell pain? Yes   Location: Arms, legs  Character: Sharp           Intensity: severe    Any radiation of pain, numbness, tingling, weakness, warmth, swelling, discoloration of arms or legs?No     Fever?No  (if yes max temperature recorded in last 24 hours):      Chest Pain Present: No     Shortness of breath: No     Other home therapies tried:         Last home medication taken and when:     Any Refills Needed?: No     Does patient have transportation & length of time to get to clinic: No         Recommendations:     IVF/Pain Meds    If you do not hear from the infusion center by 2pm then you will not be able to get in for an infusion today. If symptoms worsen while waiting for call back, and/or you experience fever, chills, SOB, chest pain, cough, n/v, dizziness, numbness, swelling, discoloration of extremities, then seek emergency evaluation in Emergency Department.     Please note, if you are late for your appt, you risk losing your infusion appt as it may delay another patient's infusion who arrived on time.           Show Aquaphor Line: Yes Samples Given: Tide Free & Gentle\\nCetaphil and Eucerin moisturizers - to be applied twice daily on affected area with rash Action 3: Continue Detail Level: Zone

## 2023-12-28 ENCOUNTER — NURSE TRIAGE (OUTPATIENT)
Dept: ONCOLOGY | Facility: CLINIC | Age: 23
End: 2023-12-28
Payer: COMMERCIAL

## 2023-12-28 ENCOUNTER — APPOINTMENT (OUTPATIENT)
Dept: LAB | Facility: CLINIC | Age: 23
End: 2023-12-28
Attending: REGISTERED NURSE
Payer: COMMERCIAL

## 2023-12-28 ENCOUNTER — INFUSION THERAPY VISIT (OUTPATIENT)
Dept: ONCOLOGY | Facility: CLINIC | Age: 23
End: 2023-12-28
Attending: PEDIATRICS
Payer: COMMERCIAL

## 2023-12-28 ENCOUNTER — ONCOLOGY VISIT (OUTPATIENT)
Dept: ONCOLOGY | Facility: CLINIC | Age: 23
End: 2023-12-28
Attending: REGISTERED NURSE
Payer: COMMERCIAL

## 2023-12-28 VITALS
BODY MASS INDEX: 25.73 KG/M2 | TEMPERATURE: 98.3 F | SYSTOLIC BLOOD PRESSURE: 117 MMHG | DIASTOLIC BLOOD PRESSURE: 63 MMHG | OXYGEN SATURATION: 97 % | HEART RATE: 97 BPM | WEIGHT: 149.9 LBS | RESPIRATION RATE: 19 BRPM

## 2023-12-28 DIAGNOSIS — I82.611 SUPERFICIAL VENOUS THROMBOSIS OF ARM, RIGHT: ICD-10-CM

## 2023-12-28 DIAGNOSIS — D57.00 SICKLE CELL PAIN CRISIS (H): Primary | ICD-10-CM

## 2023-12-28 DIAGNOSIS — D57.1 HB-SS DISEASE WITHOUT CRISIS (H): Primary | ICD-10-CM

## 2023-12-28 DIAGNOSIS — G81.10 SPASTIC HEMIPLEGIA, UNSPECIFIED ETIOLOGY, UNSPECIFIED LATERALITY (H): ICD-10-CM

## 2023-12-28 DIAGNOSIS — E83.111 IRON OVERLOAD DUE TO REPEATED RED BLOOD CELL TRANSFUSIONS: ICD-10-CM

## 2023-12-28 LAB
ANION GAP SERPL CALCULATED.3IONS-SCNC: 10 MMOL/L (ref 7–15)
BASOPHILS # BLD AUTO: 0.2 10E3/UL (ref 0–0.2)
BASOPHILS NFR BLD AUTO: 2 %
BUN SERPL-MCNC: 9.8 MG/DL (ref 6–20)
CALCIUM SERPL-MCNC: 9 MG/DL (ref 8.6–10)
CHLORIDE SERPL-SCNC: 107 MMOL/L (ref 98–107)
CREAT SERPL-MCNC: 0.52 MG/DL (ref 0.51–0.95)
DEPRECATED HCO3 PLAS-SCNC: 22 MMOL/L (ref 22–29)
EGFRCR SERPLBLD CKD-EPI 2021: >90 ML/MIN/1.73M2
EOSINOPHIL # BLD AUTO: 0.6 10E3/UL (ref 0–0.7)
EOSINOPHIL NFR BLD AUTO: 4 %
ERYTHROCYTE [DISTWIDTH] IN BLOOD BY AUTOMATED COUNT: 21.5 % (ref 10–15)
FERRITIN SERPL-MCNC: 5430 NG/ML (ref 6–175)
GLUCOSE SERPL-MCNC: 97 MG/DL (ref 70–99)
HCT VFR BLD AUTO: 22.3 % (ref 35–47)
HGB BLD-MCNC: 7.9 G/DL (ref 11.7–15.7)
IMM GRANULOCYTES # BLD: 0.1 10E3/UL
IMM GRANULOCYTES NFR BLD: 0 %
LYMPHOCYTES # BLD AUTO: 1.8 10E3/UL (ref 0.8–5.3)
LYMPHOCYTES NFR BLD AUTO: 13 %
MCH RBC QN AUTO: 29.9 PG (ref 26.5–33)
MCHC RBC AUTO-ENTMCNC: 35.4 G/DL (ref 31.5–36.5)
MCV RBC AUTO: 85 FL (ref 78–100)
MONOCYTES # BLD AUTO: 1.1 10E3/UL (ref 0–1.3)
MONOCYTES NFR BLD AUTO: 8 %
NEUTROPHILS # BLD AUTO: 10.3 10E3/UL (ref 1.6–8.3)
NEUTROPHILS NFR BLD AUTO: 73 %
NRBC # BLD AUTO: 0.1 10E3/UL
NRBC BLD AUTO-RTO: 1 /100
PLATELET # BLD AUTO: 420 10E3/UL (ref 150–450)
POTASSIUM SERPL-SCNC: 3.8 MMOL/L (ref 3.4–5.3)
RBC # BLD AUTO: 2.64 10E6/UL (ref 3.8–5.2)
RETICS # AUTO: 0.39 10E6/UL (ref 0.03–0.1)
RETICS/RBC NFR AUTO: 14.6 % (ref 0.5–2)
SODIUM SERPL-SCNC: 139 MMOL/L (ref 135–145)
WBC # BLD AUTO: 14.1 10E3/UL (ref 4–11)

## 2023-12-28 PROCEDURE — 85045 AUTOMATED RETICULOCYTE COUNT: CPT | Performed by: PEDIATRICS

## 2023-12-28 PROCEDURE — 250N000011 HC RX IP 250 OP 636: Performed by: PEDIATRICS

## 2023-12-28 PROCEDURE — 258N000003 HC RX IP 258 OP 636: Performed by: PEDIATRICS

## 2023-12-28 PROCEDURE — 96365 THER/PROPH/DIAG IV INF INIT: CPT

## 2023-12-28 PROCEDURE — 96413 CHEMO IV INFUSION 1 HR: CPT

## 2023-12-28 PROCEDURE — 99215 OFFICE O/P EST HI 40 MIN: CPT | Performed by: REGISTERED NURSE

## 2023-12-28 PROCEDURE — 80048 BASIC METABOLIC PNL TOTAL CA: CPT | Performed by: PEDIATRICS

## 2023-12-28 PROCEDURE — 250N000011 HC RX IP 250 OP 636: Performed by: REGISTERED NURSE

## 2023-12-28 PROCEDURE — 96376 TX/PRO/DX INJ SAME DRUG ADON: CPT

## 2023-12-28 PROCEDURE — 96361 HYDRATE IV INFUSION ADD-ON: CPT

## 2023-12-28 PROCEDURE — 250N000013 HC RX MED GY IP 250 OP 250 PS 637: Performed by: PEDIATRICS

## 2023-12-28 PROCEDURE — G0463 HOSPITAL OUTPT CLINIC VISIT: HCPCS | Performed by: REGISTERED NURSE

## 2023-12-28 PROCEDURE — 85025 COMPLETE CBC W/AUTO DIFF WBC: CPT | Performed by: PEDIATRICS

## 2023-12-28 PROCEDURE — 96375 TX/PRO/DX INJ NEW DRUG ADDON: CPT

## 2023-12-28 PROCEDURE — 82728 ASSAY OF FERRITIN: CPT | Performed by: REGISTERED NURSE

## 2023-12-28 PROCEDURE — 36591 DRAW BLOOD OFF VENOUS DEVICE: CPT | Performed by: PEDIATRICS

## 2023-12-28 RX ORDER — DIPHENHYDRAMINE HCL 25 MG
25 CAPSULE ORAL
Status: COMPLETED | OUTPATIENT
Start: 2023-12-28 | End: 2023-12-28

## 2023-12-28 RX ORDER — KETOROLAC TROMETHAMINE 30 MG/ML
30 INJECTION, SOLUTION INTRAMUSCULAR; INTRAVENOUS ONCE
Status: COMPLETED | OUTPATIENT
Start: 2023-12-28 | End: 2023-12-28

## 2023-12-28 RX ORDER — DEFERASIROX 360 MG/1
1440 TABLET, FILM COATED ORAL EVERY EVENING
Qty: 120 TABLET | Refills: 4 | Status: SHIPPED | OUTPATIENT
Start: 2023-12-28 | End: 2024-02-23

## 2023-12-28 RX ORDER — HEPARIN SODIUM (PORCINE) LOCK FLUSH IV SOLN 100 UNIT/ML 100 UNIT/ML
5 SOLUTION INTRAVENOUS
Status: CANCELLED | OUTPATIENT
Start: 2024-01-01

## 2023-12-28 RX ORDER — ONDANSETRON 4 MG/1
8 TABLET, FILM COATED ORAL
Status: CANCELLED
Start: 2024-01-01

## 2023-12-28 RX ORDER — HEPARIN SODIUM (PORCINE) LOCK FLUSH IV SOLN 100 UNIT/ML 100 UNIT/ML
5 SOLUTION INTRAVENOUS
Status: DISCONTINUED | OUTPATIENT
Start: 2023-12-28 | End: 2023-12-28 | Stop reason: HOSPADM

## 2023-12-28 RX ORDER — HEPARIN SODIUM (PORCINE) LOCK FLUSH IV SOLN 100 UNIT/ML 100 UNIT/ML
5 SOLUTION INTRAVENOUS ONCE
Status: COMPLETED | OUTPATIENT
Start: 2023-12-28 | End: 2023-12-28

## 2023-12-28 RX ORDER — HEPARIN SODIUM,PORCINE 10 UNIT/ML
5 VIAL (ML) INTRAVENOUS
Status: CANCELLED | OUTPATIENT
Start: 2024-01-01

## 2023-12-28 RX ORDER — ASPIRIN 81 MG/1
81 TABLET, CHEWABLE ORAL 2 TIMES DAILY
Qty: 60 TABLET | Refills: 11 | Status: SHIPPED | OUTPATIENT
Start: 2023-12-28 | End: 2024-02-23

## 2023-12-28 RX ORDER — KETOROLAC TROMETHAMINE 30 MG/ML
30 INJECTION, SOLUTION INTRAMUSCULAR; INTRAVENOUS ONCE
Status: CANCELLED
Start: 2024-01-01 | End: 2024-01-01

## 2023-12-28 RX ORDER — DIPHENHYDRAMINE HCL 25 MG
25 CAPSULE ORAL
Status: CANCELLED
Start: 2024-01-01

## 2023-12-28 RX ORDER — ONDANSETRON 8 MG/1
8 TABLET, ORALLY DISINTEGRATING ORAL
Status: COMPLETED | OUTPATIENT
Start: 2023-12-28 | End: 2023-12-28

## 2023-12-28 RX ADMIN — SODIUM CHLORIDE 340 MG: 9 INJECTION, SOLUTION INTRAVENOUS at 14:04

## 2023-12-28 RX ADMIN — HYDROMORPHONE HYDROCHLORIDE 1 MG: 1 INJECTION, SOLUTION INTRAMUSCULAR; INTRAVENOUS; SUBCUTANEOUS at 15:11

## 2023-12-28 RX ADMIN — SODIUM CHLORIDE, POTASSIUM CHLORIDE, SODIUM LACTATE AND CALCIUM CHLORIDE 1000 ML: 600; 310; 30; 20 INJECTION, SOLUTION INTRAVENOUS at 12:37

## 2023-12-28 RX ADMIN — DIPHENHYDRAMINE HYDROCHLORIDE 25 MG: 25 CAPSULE ORAL at 12:43

## 2023-12-28 RX ADMIN — HYDROMORPHONE HYDROCHLORIDE 1 MG: 1 INJECTION, SOLUTION INTRAMUSCULAR; INTRAVENOUS; SUBCUTANEOUS at 12:40

## 2023-12-28 RX ADMIN — HYDROMORPHONE HYDROCHLORIDE 1 MG: 1 INJECTION, SOLUTION INTRAMUSCULAR; INTRAVENOUS; SUBCUTANEOUS at 14:00

## 2023-12-28 RX ADMIN — Medication 5 ML: at 11:04

## 2023-12-28 RX ADMIN — KETOROLAC TROMETHAMINE 30 MG: 30 INJECTION, SOLUTION INTRAMUSCULAR; INTRAVENOUS at 12:37

## 2023-12-28 RX ADMIN — Medication 5 ML: at 15:51

## 2023-12-28 RX ADMIN — SODIUM CHLORIDE 250 ML: 9 INJECTION, SOLUTION INTRAVENOUS at 14:04

## 2023-12-28 RX ADMIN — ONDANSETRON 8 MG: 8 TABLET, ORALLY DISINTEGRATING ORAL at 12:42

## 2023-12-28 ASSESSMENT — PAIN SCALES - GENERAL: PAINLEVEL: EXTREME PAIN (9)

## 2023-12-28 NOTE — PROGRESS NOTES
Infusion Nursing Note:  Jennifer Cervnates presents today for Crizanlizumab, IVF and pain medications.    Patient seen by provider today: Yes: Patricia Mantilla NP    Note: Pt presents to clinic with c/o pain in LUE and R rib cage, 9/10, sharp and aching.  Goal today 5/10 which was surpassed by time of discharge.  Pt declined pain intervention outside of therapy plan.    Intravenous Access:  Implanted Port.    Treatment Conditions:  Lab Results   Component Value Date    HGB 7.9 (L) 12/28/2023    WBC 14.1 (H) 12/28/2023    ANEU 9.9 (H) 11/29/2023    ANEUTAUTO 10.3 (H) 12/28/2023     12/28/2023      Results reviewed, labs MET treatment parameters, ok to proceed with treatment.    Post Infusion Assessment:  Patient tolerated infusion without incident.  Report given to Lien JIMENEZ RN, at 1515 for discharge following observation.  Blood return noted pre and post infusion.  Patient observed for 30 minutes post Crizanlizumab  Site patent and intact, free from redness, edema or discomfort.  No evidence of extravasations.  Access discontinued per protocol.    Discharge Plan:   Prescription refills given for Aspirin, Jadenu and Hydroxyurea.  Discharge instructions reviewed with: Patient.  Patient and/or family verbalized understanding of discharge instructions and all questions answered.  AVS to patient via [employer].  Patient will return 1/26/2024 for next appointment.   Patient discharged in stable condition accompanied by: self.  Departure Mode: Ambulatory.    Lupe Bolton RN

## 2023-12-28 NOTE — NURSING NOTE
"Chief Complaint   Patient presents with    Oncology Clinic Visit     Sickle cell anemia     Port Draw     Labs drawn via port by RN.      Labs drawn via port by RN. Port accessed with 20G 3/4\" power needle. Flushed with NS and heparin. Pt tolerated well. Vitals taken. Pt checked in for next appointment.    Rin Mckeon, RN  "

## 2023-12-28 NOTE — LETTER
12/28/2023         RE: Jennifer Cervantes  [address]  [facility] [address]        Dear Colleague,    Thank you for referring your patient, Jennifer Cervantes, to the [facility]. Please see a copy of my visit note below.    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 She had multiple thromboembolic events in 2021 despite adherent anticoagulation use (though warfarin was perpetually low) and there are concerns for chronic thromboembolic disease but did not have pulmonary HTN on a November 2021 cath. She is maintained on chronic PO opioids and twice-weekly infusion visits (since 1/24/22) but has been able to be maintained on this regimen and has stayed out of the ED most of the time with even rarer admissions.     Interval History:  Jennifer is seen in clinic today for routine follow-up on SCD and sidney infusion. She's continued to struggle with increased pain over the past few months. Her ED visits have increased, both due to increased pain as well as limitations to get in consistently to the infusion center. Most recently over the past 3 weeks she's had pain throughout her left arm. Initially this was impairing function of her arm to the point that there were times her mom had to feed her and help her in the shower. The function of her arm has now improved but she is still having persistent pain in this area, often starting in the wrist and radiating up to her shoulder. She has not been assigned many hours at  "work but has also missed many days recently due to her pain. She finds hot showers to be most helpful and is also utilizing Tylenol and ibuprofen before using oxycodone. She notes an increase in what she feels are anxiety attacks occurring primarily at night. Today she spoke in detail about her past experience with sickle cell and the difficulty she has faced with multiple family members as well as medical personnel not believing that she is really in pain or is being truthful about symptoms. This has influenced her current relationships with family, medical team and her boyfriend. For example, she notes that her boyfriend often inquires about how she is feeling physically although she rarely tells him the truth when she is in pain. She feels that this extensive history is contributing to her anxiety. She inquired about resuming Xanax, stating she took this when followed in the pediatric clinic. She feels this would help calm her attacks and allow her to sleep better at night. She is also interested in possibly resuming desferal infusions for iron chelation now that she's not working as much.    Sickle Cell Disease Comprehensive Checklist  Bone Health/Avascular Necrosis Screening/Symptoms (each visit): no new concerns today  Leg Ulcer evaluation (every visit): none  Hypertension (every visit):stable 11/3/23  Last pulmonary evaluation (asthma, AMAN, pulm HTN): 9/28/22, due for follow-up  Stroke/silent cerebral infarct Hx (Y/N): Yes TIA ~2014, first event ~age 1 with full stroke and R sided weakness  Last PCP Visit: 3/6/23  Vaccines:  PCV13: 5/13/19  Pneumovax (PPSV23): 3/04, 10/09, 7/12/19 (next due 7/2024)  Menactra: 4/2010, 9/2015 (MCV done 8/16/21)  MenB: 9/16/15, 5/13/19  Influenza: 10/2/23  Audiology (chelation): done 6/2020, normal.. However, on 6/7, \"While there is no significant change in grade on the CTCAE 4.03 Scale, there were hearing changes bilaterally for both standard and extended high frequency " "audiometry.  Will need to determine if an ENT consult is advised due to the asymmetry in extended high frequency testing.\"     Plan last reviewed with patient: 10/2/23    Patient background: 22 yo F, enjoys movies and kids though there are times where she does not really want to talk to people. Does not have a lot of social support at home.     Sickle Cell Disease History  Primary Hematologist Team: Jose Rafael Duncan  PCP: none  Genotype: SS  Acute Pain Crisis Treatment: (limiting IV   ER   Dilaudid 1 mg IV q1h up to 3 doses  Toradol 30 mg IV x1   Maintenance IV fluids with LR  Other: Zofran 8 mg IV PRN nausea  Inpatient:  PCA Dilaudid 1 hr q30 minutes, no basal rate  Toradol 30 mg x6h x 4 hr  LR maintenance x 1-2 days  Other Medications: Zofran  ASA  Supportive Care: Docusate, Senna  Chronic Pain Medications:    Home regimen: oxycodone 15 mg p.o. q.4-6 hours p.r.n. breakthrough pain.  She also continues on Voltaren gel, and Zoloft among other medications.    -Also benefits from mental health visits, acupuncture  Baseline Hemoglobin: 7 g/dl without chronic transfusions  Hydroxyurea use: Yes  H/O blood transfusions: Yes, several (iron overload) Most recent 11/20/2021  H/O Transfusion Reactions: no  Antibodies:none  H/O Acute Chest Syndrome: Yes  Last episode:9/05/22 (previously 4/26/21, 10/2019)   ICU/intubation: not with 9/2022 admission  H/O Stroke: Yes (managed with chronic transfusions in the past, stopped late Spring 2020)  H/O VTE: Yes (2/2021)  H/O Cholecystectomy or Splenectomy: no  H/O Asthma, Pulm HTN, AVN, Leg Ulcers, Nephropathy, Retinopathy, etc: Iron overload, asthma, chronic lung disease, physical limitations from early stroke    ---------------------------------------  Jennifer Cevrantes's Goals (discussed today, 12/28/23)    1-3 month goal:  Look for a new job in January    6 month goal:  Save money for ultimate goal of moving out    12 month goal:  Move into her own place     Disease-specific " goal(s):  Decrease frequency of ED and infusion center visits.  ---------------------------------------      Current Outpatient Medications   Medication Sig Dispense Refill    acetaminophen (TYLENOL) 325 MG tablet Take 2 tablets (650 mg) by mouth every 6 hours as needed for mild pain 120 tablet 3    albuterol (PROAIR HFA/PROVENTIL HFA/VENTOLIN HFA) 108 (90 Base) MCG/ACT inhaler Inhale 2 puffs into the lungs every 6 hours as needed for shortness of breath or wheezing 8.5 g 3    albuterol (PROVENTIL) (2.5 MG/3ML) 0.083% neb solution Take 2 vials (5 mg) by nebulization every 6 hours as needed for shortness of breath or wheezing 90 mL 3    aspirin (ASA) 81 MG chewable tablet Take 1 tablet (81 mg) by mouth 2 times daily 60 tablet 11    budesonide-formoterol (SYMBICORT) 160-4.5 MCG/ACT Inhaler Inhale 2 puffs into the lungs 2 times daily 10.2 g 3    cetirizine (ZYRTEC) 10 MG tablet Take 1 tablet (10 mg) by mouth daily 30 tablet 1    deferasirox (JADENU) 360 MG tablet Take 4 tablets (1,440 mg) by mouth every evening 120 tablet 4    diphenhydrAMINE (BENADRYL) 25 MG capsule Take 1 capsule (25 mg) by mouth every 6 hours as needed for itching or allergies 30 capsule 0    EPINEPHrine (ANY BX GENERIC EQUIV) 0.3 MG/0.3ML injection 2-pack Inject 0.3 mLs (0.3 mg) into the muscle as needed for anaphylaxis 1 each 1    Hydroxyurea 1000 MG TABS Take 3,000 mg by mouth daily 90 tablet 3    methocarbamol (ROBAXIN) 750 MG tablet Take 1 tablet (750 mg) by mouth 4 times daily as needed for muscle spasms (during sickle pain crises. Okay to take scheduled while in pain) 60 tablet 1    ondansetron (ZOFRAN) 8 MG tablet Take 1 tablet (8 mg) by mouth every 8 hours as needed 30 tablet 1    oxyCODONE IR (ROXICODONE) 10 MG tablet Take 1 tablet (10 mg) by mouth every 4 hours as needed for severe pain or breakthrough pain Goal 4 per day. Max 6 per day. 25 tablet 0    naloxone (NARCAN) 4 MG/0.1ML nasal spray Spray 4 mg into one nostril alternating  nostrils as needed for opioid reversal every 2-3 minutes until assistance arrives (Patient not taking: Reported on 12/6/2023)         Past Medical History  Past Medical History:   Diagnosis Date    Anxiety     Bleeding disorder (H24)     Blood clotting disorder (H24)     Cerebral infarction (H) 2015    Cognitive developmental delay     low IQ. 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 side (H)     right hand contractures    Iron overload due to repeated red blood cell transfusions     Migraines     Multiple subsegmental pulmonary emboli without acute cor pulmonale (H) 02/01/2021    Oppositional defiant behavior     Presence of intrauterine contraceptive device 2/18/2020    Superficial venous thrombosis of arm, right 03/25/2021    Uncomplicated asthma      Past Surgical History:   Procedure Laterality Date    AS INSERT TUNNELED CV 2 CATH W/O PORT/PUMP      CHOLECYSTECTOMY      CV RIGHT HEART CATH MEASUREMENTS RECORDED N/A 11/18/2021    Procedure: Right Heart Cath;  Surgeon: Jackson Stauffer MD;  Location:  HEART CARDIAC CATH LAB    INSERT PORT VASCULAR ACCESS Left 4/21/2021    Procedure: INSERTION, VASCULAR ACCESS PORT (NOT SURE ON SIDE UNTIL REMOVAL);  Surgeon: Rajan More MD;  Location: [facility] OR    IR CHEST PORT PLACEMENT > 5 YRS OF AGE  4/21/2021    IR CVC NON TUNNEL LINE REMOVAL  5/6/2021    IR CVC NON TUNNEL PLACEMENT > 5 YRS  04/07/2020    IR CVC NON TUNNEL PLACEMENT > 5 YRS  4/30/2021    IR CVC NON TUNNEL PLACEMENT > 5 YRS  9/7/2022    IR PORT REMOVAL LEFT  4/21/2021    REMOVE PORT VASCULAR ACCESS Left 4/21/2021    Procedure: REMOVAL, VASCULAR ACCESS PORT LEFT;  Surgeon: Rajan More MD;  Location: [facility] OR    REPAIR TENDON ELBOW Right 10/02/2019    Procedure: Right Elbow Flexor Lengthening, Flexor Pronator Slide Of Wrist and Finger, Thumb Adductor Lengthening;  Surgeon: Rob Portillo  Anai Colby MD;  Location: [facility] OR    TONSILLECTOMY Bilateral 10/02/2019    Procedure: Bilateral Tonsillectomy;  Surgeon: Farhana Guy MD;  Location:  OR    [facility] BREAST REDUCTION (INCLUDES LIPO) TIER 3 Bilateral 04/23/2019     Allergies   Allergen Reactions    Contrast Dye      Hives and breathing issues    Fish-Derived Products Hives    Seafood Hives    Adhesive Tape Hives     Primipore dressing causes hives    Gadolinium     Iodinated Contrast Media      Social History   Social History     Tobacco Use    Smoking status: Never     Passive exposure: Never    Smokeless tobacco: Never   Substance Use Topics    Alcohol use: Not Currently     Alcohol/week: 0.0 standard drinks of alcohol    Drug use: Never    Still living at home with mom and extended family.   Past medical history and social history were reviewed.    Physical Examination:  /63 (BP Location: Right arm, Patient Position: Sitting, Cuff Size: Adult Regular)   Pulse 97   Temp 98.3  F (36.8  C) (Oral)   Resp 19   Wt 68 kg (149 lb 14.4 oz)   SpO2 97%   BMI 25.73 kg/m       Wt Readings from Last 10 Encounters:   12/28/23 68 kg (149 lb 14.4 oz)   12/22/23 68 kg (150 lb)   12/20/23 67 kg (147 lb 11.2 oz)   12/08/23 68 kg (150 lb)   12/01/23 66.9 kg (147 lb 6.4 oz)   11/29/23 67.1 kg (148 lb)   11/27/23 66.5 kg (146 lb 9.6 oz)   11/24/23 66.1 kg (145 lb 12.8 oz)   11/11/23 66 kg (145 lb 8 oz)   11/01/23 65.9 kg (145 lb 4.8 oz)     General: Pleasant female, NAD  Eyes: EOMI, PERRL. Mild. scleral icterus.  Respiratory: CTA bilaterally, diminished in bases. No wheezing.  Cardiac: RRR, no m/g/r  Ext: No peripheral edema.  MSK: Contractured right arm and hand 2/2 stoke.  Neurologic: Grossly nonfocal. A/O x 4.  Skin: No rashes, petechiae, or bruising noted on exposed skin. Port accessed in left chest    Laboratory Data:  Recent Results (from the past 240 hour(s))   CBC with platelets and differential    Collection Time: 12/20/23  1:56 PM    Result Value Ref Range    WBC Count 11.1 (H) 4.0 - 11.0 10e3/uL    RBC Count 2.64 (L) 3.80 - 5.20 10e6/uL    Hemoglobin 7.9 (L) 11.7 - 15.7 g/dL    Hematocrit 22.5 (L) 35.0 - 47.0 %    MCV 85 78 - 100 fL    MCH 29.9 26.5 - 33.0 pg    MCHC 35.1 31.5 - 36.5 g/dL    RDW 22.2 (H) 10.0 - 15.0 %    Platelet Count 430 150 - 450 10e3/uL    % Neutrophils 72 %    % Lymphocytes 17 %    % Monocytes 7 %    % Eosinophils 3 %    % Basophils 1 %    % Immature Granulocytes 0 %    NRBCs per 100 WBC 1 (H) <1 /100    Absolute Neutrophils 7.9 1.6 - 8.3 10e3/uL    Absolute Lymphocytes 1.9 0.8 - 5.3 10e3/uL    Absolute Monocytes 0.8 0.0 - 1.3 10e3/uL    Absolute Eosinophils 0.4 0.0 - 0.7 10e3/uL    Absolute Basophils 0.1 0.0 - 0.2 10e3/uL    Absolute Immature Granulocytes 0.0 <=0.4 10e3/uL    Absolute NRBCs 0.1 10e3/uL   Reticulocyte count    Collection Time: 12/22/23 11:21 PM   Result Value Ref Range    % Reticulocyte 15.3 (H) 0.5 - 2.0 %    Absolute Reticulocyte 0.410 (H) 0.025 - 0.095 10e6/uL   CBC with platelets and differential    Collection Time: 12/22/23 11:21 PM   Result Value Ref Range    WBC Count 13.6 (H) 4.0 - 11.0 10e3/uL    RBC Count 2.59 (L) 3.80 - 5.20 10e6/uL    Hemoglobin 7.7 (L) 11.7 - 15.7 g/dL    Hematocrit 22.5 (L) 35.0 - 47.0 %    MCV 87 78 - 100 fL    MCH 29.7 26.5 - 33.0 pg    MCHC 34.2 31.5 - 36.5 g/dL    RDW 21.9 (H) 10.0 - 15.0 %    Platelet Count 394 150 - 450 10e3/uL    % Neutrophils 66 %    % Lymphocytes 19 %    % Monocytes 7 %    % Eosinophils 5 %    % Basophils 2 %    % Immature Granulocytes 1 %    NRBCs per 100 WBC 1 (H) <1 /100    Absolute Neutrophils 9.1 (H) 1.6 - 8.3 10e3/uL    Absolute Lymphocytes 2.6 0.8 - 5.3 10e3/uL    Absolute Monocytes 1.0 0.0 - 1.3 10e3/uL    Absolute Eosinophils 0.6 0.0 - 0.7 10e3/uL    Absolute Basophils 0.2 0.0 - 0.2 10e3/uL    Absolute Immature Granulocytes 0.1 <=0.4 10e3/uL    Absolute NRBCs 0.2 10e3/uL   Basic metabolic panel    Collection Time: 12/28/23 11:11 AM    Result Value Ref Range    Sodium 139 135 - 145 mmol/L    Potassium 3.8 3.4 - 5.3 mmol/L    Chloride 107 98 - 107 mmol/L    Carbon Dioxide (CO2) 22 22 - 29 mmol/L    Anion Gap 10 7 - 15 mmol/L    Urea Nitrogen 9.8 6.0 - 20.0 mg/dL    Creatinine 0.52 0.51 - 0.95 mg/dL    GFR Estimate >90 >60 mL/min/1.73m2    Calcium 9.0 8.6 - 10.0 mg/dL    Glucose 97 70 - 99 mg/dL   Reticulocyte count    Collection Time: 12/28/23 11:11 AM   Result Value Ref Range    % Reticulocyte 14.6 (H) 0.5 - 2.0 %    Absolute Reticulocyte 0.385 (H) 0.025 - 0.095 10e6/uL   Ferritin    Collection Time: 12/28/23 11:11 AM   Result Value Ref Range    Ferritin 5,430 (H) 6 - 175 ng/mL   CBC with platelets and differential    Collection Time: 12/28/23 11:11 AM   Result Value Ref Range    WBC Count 14.1 (H) 4.0 - 11.0 10e3/uL    RBC Count 2.64 (L) 3.80 - 5.20 10e6/uL    Hemoglobin 7.9 (L) 11.7 - 15.7 g/dL    Hematocrit 22.3 (L) 35.0 - 47.0 %    MCV 85 78 - 100 fL    MCH 29.9 26.5 - 33.0 pg    MCHC 35.4 31.5 - 36.5 g/dL    RDW 21.5 (H) 10.0 - 15.0 %    Platelet Count 420 150 - 450 10e3/uL    % Neutrophils 73 %    % Lymphocytes 13 %    % Monocytes 8 %    % Eosinophils 4 %    % Basophils 2 %    % Immature Granulocytes 0 %    NRBCs per 100 WBC 1 (H) <1 /100    Absolute Neutrophils 10.3 (H) 1.6 - 8.3 10e3/uL    Absolute Lymphocytes 1.8 0.8 - 5.3 10e3/uL    Absolute Monocytes 1.1 0.0 - 1.3 10e3/uL    Absolute Eosinophils 0.6 0.0 - 0.7 10e3/uL    Absolute Basophils 0.2 0.0 - 0.2 10e3/uL    Absolute Immature Granulocytes 0.1 <=0.4 10e3/uL    Absolute NRBCs 0.1 10e3/uL       Assessment and Plan:  1. Sickle Cell HgbSS Disease  2. Acute pain crises  3. Chronic Pain  4. 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 History of CVA  7. Hearing loss    Jennifer is seen for routine sickle cell follow-up today. She has been experiencing an increase in sickle cell pain over the past 2 months leading to increased ED visits. She  continues to pursue infusion room visits for IV hydration and pain control although some weeks is only able to get in for one infusion due to capacity. Due to this increased pain we have not recently been able to move forward with further tapering of oxycodone although this is still very much a goal of Jennifer's. A left upper extremity US was performed on 12/20/23 to rule out DVT which was negative.     Her breathing and cough are improving. She does not currently feel that her asthma is uncontrolled. Pulmonology/asthma follow-up is scheduled next month to ensure she does not need any medication changes.     I will discuss resumption of IV desferal with Dr. Duncan, however, we discussed it may be appropriate to take a longer break from treatment and continue Jadenu given improvement on her last Ferriscan from September 2023 as well as due to ototoxicity and ocular risks with chronic treatment.    She will receive IV fluids and pain medication along with sidney today. As we move in to the near year, Jennifer is focused on her goal to decrease ED visits and stay on top of her pain control. We will continue to readdress or shared goal of reducing oxycodone use moving forward although have no plans for prescription adjustment today.     We do need to make sure she gets back into audiology and ophthalmology annually due to chelator. Last eye exam was recently completed on 10/25/23. Audiology visit scheduled 2/7/24.      Plan:  -Pulmonology follow-up as planned January 2024  -Continue Hydrea to 3000mg daily to help lessen frequency of sickle cell pain.   -Continue monthly crizanlizumab infusions.  -Continue slow taper of oxycodone. Currently receiving oxycodone 10 mg every 4 hours PRN, qty 25. Recommend tapering to qty 20 with next refill.  -She can self-reduce infusion days/week and we will continue to keep the cap at 2/week for now.  -Continue infusion center visits limited to two times per week (Mondays and Fridays ideally  although still needs to call to request). Continue diligent home management with current medications, heat, rest, compression, warm baths. Methocarbamol was recently added which Jennifer is utilizing and finds helpful.  -If unable to manage at home can go to ED  with continued plan to use IV Dilaudid and take a break from ketamine (10/2/23). No PCA use and goal for any admission would still be to discharge by 5 days or less  - Desferal infusions on hold. Continue Jadenu. Repeat Ferriscan in 4-6 months (January-March 2024). Will discuss possible resumption with Dr. Duncan.  -Reviewed tactics to control anxiety. I have hesitancy to prescribe Xanax even if she has used this in the past due to her concurrent use with oxycodone. Suggested trial of selective serotonin reuptake inhibitor or could pursue psychotherapy. Will also discuss with Dr. Duncan.  -Continue aspirin BID  -RTC with me in 2 months     60 minutes spent on the date of the encounter doing chart review, review of test results, interpretation of tests, patient visit, and documentation     Patricia Mantilla, CNP

## 2023-12-28 NOTE — TELEPHONE ENCOUNTER
Oncology Nurse Triage - Sickle Cell Pain Crisis:    Situation: Jennifer  calling about Sickle Cell Pain Crisis, requesting to be added on for IV fluids and pain medicine    Background:     Patient's last infusion was 12/20/23   Last clinic visit date:11/27/23 Patricia Mantilla   Does patient have active treatment plan?  Yes      Assessment of Symptoms:  Onset/Duration of symptoms: 4 day    Is it typical sickle cell pain? Yes   Location: left arm and right side   Character: Sharp           Intensity: 9/10    Any radiation of pain, numbness, tingling, weakness, warmth, swelling, discoloration of arms or legs?No     Fever?No  (if yes max temperature recorded in last 24 hours):      Chest Pain Present: No     Shortness of breath: No     Other home therapies tried: HEAT/HEATING PAD and WARM BATH     Last home medication taken and when: oxy at 6am     Any Refills Needed?: No     Does patient have transportation & length of time to get to clinic: Yes has sidney infusion at 12:30         Recommendations:     Placed on infusion call list   7:08 message sent to Patricia Mantilla to see if OK to add on IVF/PM to sidney.   Approved by Patricia Mantilla     Per Infusion nurses OK to add IVF/PM to Sidney today.   7:41 Call placed to Jennifer to let her know that it was OK to add IVF/PM to her sidney infusion today      If you do not hear from the infusion center by 2pm then you will not be able to get in for an infusion today. If symptoms worsen while waiting for call back, and/or you experience fever, chills, SOB, chest pain, cough, n/v, dizziness, numbness, swelling, discoloration of extremities, then seek emergency evaluation in Emergency Department.     Please note, if you are late for your appt, you risk losing your infusion appt as it may delay another patient's infusion who arrived on time.

## 2023-12-28 NOTE — PROGRESS NOTES
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 She had multiple thromboembolic events in 2021 despite adherent anticoagulation use (though warfarin was perpetually low) and there are concerns for chronic thromboembolic disease but did not have pulmonary HTN on a November 2021 cath. She is maintained on chronic PO opioids and twice-weekly infusion visits (since 1/24/22) but has been able to be maintained on this regimen and has stayed out of the ED most of the time with even rarer admissions.     Interval History:  Jennifer is seen in clinic today for routine follow-up on SCD and sidney infusion. She's continued to struggle with increased pain over the past few months. Her ED visits have increased, both due to increased pain as well as limitations to get in consistently to the infusion center. Most recently over the past 3 weeks she's had pain throughout her left arm. Initially this was impairing function of her arm to the point that there were times her mom had to feed her and help her in the shower. The function of her arm has now improved but she is still having persistent pain in this area, often starting in the wrist and radiating up to her shoulder. She has not been assigned many hours at work but has also missed many days recently due to her pain. She finds hot showers to be most helpful and is also utilizing Tylenol and ibuprofen before using oxycodone. She notes an increase in what she feels are anxiety attacks occurring primarily at  "night. Today she spoke in detail about her past experience with sickle cell and the difficulty she has faced with multiple family members as well as medical personnel not believing that she is really in pain or is being truthful about symptoms. This has influenced her current relationships with family, medical team and her boyfriend. For example, she notes that her boyfriend often inquires about how she is feeling physically although she rarely tells him the truth when she is in pain. She feels that this extensive history is contributing to her anxiety. She inquired about resuming Xanax, stating she took this when followed in the pediatric clinic. She feels this would help calm her attacks and allow her to sleep better at night. She is also interested in possibly resuming desferal infusions for iron chelation now that she's not working as much.    Sickle Cell Disease Comprehensive Checklist  Bone Health/Avascular Necrosis Screening/Symptoms (each visit): no new concerns today  Leg Ulcer evaluation (every visit): none  Hypertension (every visit):stable 11/3/23  Last pulmonary evaluation (asthma, AMAN, pulm HTN): 9/28/22, due for follow-up  Stroke/silent cerebral infarct Hx (Y/N): Yes TIA ~2014, first event ~age 1 with full stroke and R sided weakness  Last PCP Visit: 3/6/23  Vaccines:  PCV13: 5/13/19  Pneumovax (PPSV23): 3/04, 10/09, 7/12/19 (next due 7/2024)  Menactra: 4/2010, 9/2015 (MCV done 8/16/21)  MenB: 9/16/15, 5/13/19  Influenza: 10/2/23  Audiology (chelation): done 6/2020, normal.. However, on 6/7, \"While there is no significant change in grade on the CTCAE 4.03 Scale, there were hearing changes bilaterally for both standard and extended high frequency audiometry.  Will need to determine if an ENT consult is advised due to the asymmetry in extended high frequency testing.\"     Plan last reviewed with patient: 10/2/23    Patient background: 24 yo F, enjoys movies and kids though there are times where she does " not really want to talk to people. Does not have a lot of social support at home.     Sickle Cell Disease History  Primary Hematologist Team: Jose Rafael Duncan  PCP: none  Genotype: SS  Acute Pain Crisis Treatment: (limiting IV   ER   Dilaudid 1 mg IV q1h up to 3 doses  Toradol 30 mg IV x1   Maintenance IV fluids with LR  Other: Zofran 8 mg IV PRN nausea  Inpatient:  PCA Dilaudid 1 hr q30 minutes, no basal rate  Toradol 30 mg x6h x 4 hr  LR maintenance x 1-2 days  Other Medications: Zofran  ASA  Supportive Care: Docusate, Senna  Chronic Pain Medications:    Home regimen: oxycodone 15 mg p.o. q.4-6 hours p.r.n. breakthrough pain.  She also continues on Voltaren gel, and Zoloft among other medications.    -Also benefits from mental health visits, acupuncture  Baseline Hemoglobin: 7 g/dl without chronic transfusions  Hydroxyurea use: Yes  H/O blood transfusions: Yes, several (iron overload) Most recent 11/20/2021  H/O Transfusion Reactions: no  Antibodies:none  H/O Acute Chest Syndrome: Yes  Last episode:9/05/22 (previously 4/26/21, 10/2019)   ICU/intubation: not with 9/2022 admission  H/O Stroke: Yes (managed with chronic transfusions in the past, stopped late Spring 2020)  H/O VTE: Yes (2/2021)  H/O Cholecystectomy or Splenectomy: no  H/O Asthma, Pulm HTN, AVN, Leg Ulcers, Nephropathy, Retinopathy, etc: Iron overload, asthma, chronic lung disease, physical limitations from early stroke    ---------------------------------------  Jennifer Cervantes's Goals (discussed today, 12/28/23)    1-3 month goal:  Look for a new job in January    6 month goal:  Save money for ultimate goal of moving out    12 month goal:  Move into her own place     Disease-specific goal(s):  Decrease frequency of ED and infusion center visits.  ---------------------------------------      Current Outpatient Medications   Medication Sig Dispense Refill    acetaminophen (TYLENOL) 325 MG tablet Take 2 tablets (650 mg) by mouth every 6 hours as needed for  mild pain 120 tablet 3    albuterol (PROAIR HFA/PROVENTIL HFA/VENTOLIN HFA) 108 (90 Base) MCG/ACT inhaler Inhale 2 puffs into the lungs every 6 hours as needed for shortness of breath or wheezing 8.5 g 3    albuterol (PROVENTIL) (2.5 MG/3ML) 0.083% neb solution Take 2 vials (5 mg) by nebulization every 6 hours as needed for shortness of breath or wheezing 90 mL 3    aspirin (ASA) 81 MG chewable tablet Take 1 tablet (81 mg) by mouth 2 times daily 60 tablet 11    budesonide-formoterol (SYMBICORT) 160-4.5 MCG/ACT Inhaler Inhale 2 puffs into the lungs 2 times daily 10.2 g 3    cetirizine (ZYRTEC) 10 MG tablet Take 1 tablet (10 mg) by mouth daily 30 tablet 1    deferasirox (JADENU) 360 MG tablet Take 4 tablets (1,440 mg) by mouth every evening 120 tablet 4    diphenhydrAMINE (BENADRYL) 25 MG capsule Take 1 capsule (25 mg) by mouth every 6 hours as needed for itching or allergies 30 capsule 0    EPINEPHrine (ANY BX GENERIC EQUIV) 0.3 MG/0.3ML injection 2-pack Inject 0.3 mLs (0.3 mg) into the muscle as needed for anaphylaxis 1 each 1    Hydroxyurea 1000 MG TABS Take 3,000 mg by mouth daily 90 tablet 3    methocarbamol (ROBAXIN) 750 MG tablet Take 1 tablet (750 mg) by mouth 4 times daily as needed for muscle spasms (during sickle pain crises. Okay to take scheduled while in pain) 60 tablet 1    ondansetron (ZOFRAN) 8 MG tablet Take 1 tablet (8 mg) by mouth every 8 hours as needed 30 tablet 1    oxyCODONE IR (ROXICODONE) 10 MG tablet Take 1 tablet (10 mg) by mouth every 4 hours as needed for severe pain or breakthrough pain Goal 4 per day. Max 6 per day. 25 tablet 0    naloxone (NARCAN) 4 MG/0.1ML nasal spray Spray 4 mg into one nostril alternating nostrils as needed for opioid reversal every 2-3 minutes until assistance arrives (Patient not taking: Reported on 12/6/2023)         Past Medical History  Past Medical History:   Diagnosis Date    Anxiety     Bleeding disorder (H24)     Blood clotting disorder (H24)     Cerebral  infarction (H) 2015    Cognitive developmental delay     low IQ. 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 side (H)     right hand contractures    Iron overload due to repeated red blood cell transfusions     Migraines     Multiple subsegmental pulmonary emboli without acute cor pulmonale (H) 02/01/2021    Oppositional defiant behavior     Presence of intrauterine contraceptive device 2/18/2020    Superficial venous thrombosis of arm, right 03/25/2021    Uncomplicated asthma      Past Surgical History:   Procedure Laterality Date    AS INSERT TUNNELED CV 2 CATH W/O PORT/PUMP      CHOLECYSTECTOMY      CV RIGHT HEART CATH MEASUREMENTS RECORDED N/A 11/18/2021    Procedure: Right Heart Cath;  Surgeon: Jackson Stauffer MD;  Location:  HEART CARDI[facility] CATH LAB    INSERT PORT VASCULAR ACCESS Left 4/21/2021    Procedure: INSERTION, VASCULAR ACCESS PORT (NOT SURE ON SIDE UNTIL REMOVAL);  Surgeon: Rajan More MD;  Location: [facility] OR    IR CHEST PORT PLACEMENT > 5 YRS OF AGE  4/21/2021    IR CVC NON TUNNEL LINE REMOVAL  5/6/2021    IR CVC NON TUNNEL PLACEMENT > 5 YRS  04/07/2020    IR CVC NON TUNNEL PLACEMENT > 5 YRS  4/30/2021    IR CVC NON TUNNEL PLACEMENT > 5 YRS  9/7/2022    IR PORT REMOVAL LEFT  4/21/2021    REMOVE PORT VASCULAR ACCESS Left 4/21/2021    Procedure: REMOVAL, VASCULAR ACCESS PORT LEFT;  Surgeon: Rajan More MD;  Location: [facility] OR    REPAIR TENDON ELBOW Right 10/02/2019    Procedure: Right Elbow Flexor Lengthening, Flexor Pronator Slide Of Wrist and Finger, Thumb Adductor Lengthening;  Surgeon: Anai Franco MD;  Location: [facility]    TONSILLECTOMY Bilateral 10/02/2019    Procedure: Bilateral Tonsillectomy;  Surgeon: Farhana Guy MD;  Location: [facility] OR    ZZC BREAST REDUCTION (INCLUDES LIPO) TIER 3 Bilateral 04/23/2019     Allergies   Allergen Reactions     Contrast Dye      Hives and breathing issues    Fish-Derived Products Hives    Seafood Hives    Adhesive Tape Hives     Primipore dressing causes hives    Gadolinium     Iodinated Contrast Media      Social History   Social History     Tobacco Use    Smoking status: Never     Passive exposure: Never    Smokeless tobacco: Never   Substance Use Topics    Alcohol use: Not Currently     Alcohol/week: 0.0 standard drinks of alcohol    Drug use: Never    Still living at home with mom and extended family.   Past medical history and social history were reviewed.    Physical Examination:  /63 (BP Location: Right arm, Patient Position: Sitting, Cuff Size: Adult Regular)   Pulse 97   Temp 98.3  F (36.8  C) (Oral)   Resp 19   Wt 68 kg (149 lb 14.4 oz)   SpO2 97%   BMI 25.73 kg/m       Wt Readings from Last 10 Encounters:   12/28/23 68 kg (149 lb 14.4 oz)   12/22/23 68 kg (150 lb)   12/20/23 67 kg (147 lb 11.2 oz)   12/08/23 68 kg (150 lb)   12/01/23 66.9 kg (147 lb 6.4 oz)   11/29/23 67.1 kg (148 lb)   11/27/23 66.5 kg (146 lb 9.6 oz)   11/24/23 66.1 kg (145 lb 12.8 oz)   11/11/23 66 kg (145 lb 8 oz)   11/01/23 65.9 kg (145 lb 4.8 oz)     General: Pleasant female, NAD  Eyes: EOMI, PERRL. Mild. scleral icterus.  Respiratory: CTA bilaterally, diminished in bases. No wheezing.  Cardiac: RRR, no m/g/r  Ext: No peripheral edema.  MSK: Contractured right arm and hand 2/2 stoke.  Neurologic: Grossly nonfocal. A/O x 4.  Skin: No rashes, petechiae, or bruising noted on exposed skin. Port accessed in left chest    Laboratory Data:  Recent Results (from the past 240 hour(s))   CBC with platelets and differential    Collection Time: 12/20/23  1:56 PM   Result Value Ref Range    WBC Count 11.1 (H) 4.0 - 11.0 10e3/uL    RBC Count 2.64 (L) 3.80 - 5.20 10e6/uL    Hemoglobin 7.9 (L) 11.7 - 15.7 g/dL    Hematocrit 22.5 (L) 35.0 - 47.0 %    MCV 85 78 - 100 fL    MCH 29.9 26.5 - 33.0 pg    MCHC 35.1 31.5 - 36.5 g/dL    RDW 22.2 (H)  10.0 - 15.0 %    Platelet Count 430 150 - 450 10e3/uL    % Neutrophils 72 %    % Lymphocytes 17 %    % Monocytes 7 %    % Eosinophils 3 %    % Basophils 1 %    % Immature Granulocytes 0 %    NRBCs per 100 WBC 1 (H) <1 /100    Absolute Neutrophils 7.9 1.6 - 8.3 10e3/uL    Absolute Lymphocytes 1.9 0.8 - 5.3 10e3/uL    Absolute Monocytes 0.8 0.0 - 1.3 10e3/uL    Absolute Eosinophils 0.4 0.0 - 0.7 10e3/uL    Absolute Basophils 0.1 0.0 - 0.2 10e3/uL    Absolute Immature Granulocytes 0.0 <=0.4 10e3/uL    Absolute NRBCs 0.1 10e3/uL   Reticulocyte count    Collection Time: 12/22/23 11:21 PM   Result Value Ref Range    % Reticulocyte 15.3 (H) 0.5 - 2.0 %    Absolute Reticulocyte 0.410 (H) 0.025 - 0.095 10e6/uL   CBC with platelets and differential    Collection Time: 12/22/23 11:21 PM   Result Value Ref Range    WBC Count 13.6 (H) 4.0 - 11.0 10e3/uL    RBC Count 2.59 (L) 3.80 - 5.20 10e6/uL    Hemoglobin 7.7 (L) 11.7 - 15.7 g/dL    Hematocrit 22.5 (L) 35.0 - 47.0 %    MCV 87 78 - 100 fL    MCH 29.7 26.5 - 33.0 pg    MCHC 34.2 31.5 - 36.5 g/dL    RDW 21.9 (H) 10.0 - 15.0 %    Platelet Count 394 150 - 450 10e3/uL    % Neutrophils 66 %    % Lymphocytes 19 %    % Monocytes 7 %    % Eosinophils 5 %    % Basophils 2 %    % Immature Granulocytes 1 %    NRBCs per 100 WBC 1 (H) <1 /100    Absolute Neutrophils 9.1 (H) 1.6 - 8.3 10e3/uL    Absolute Lymphocytes 2.6 0.8 - 5.3 10e3/uL    Absolute Monocytes 1.0 0.0 - 1.3 10e3/uL    Absolute Eosinophils 0.6 0.0 - 0.7 10e3/uL    Absolute Basophils 0.2 0.0 - 0.2 10e3/uL    Absolute Immature Granulocytes 0.1 <=0.4 10e3/uL    Absolute NRBCs 0.2 10e3/uL   Basic metabolic panel    Collection Time: 12/28/23 11:11 AM   Result Value Ref Range    Sodium 139 135 - 145 mmol/L    Potassium 3.8 3.4 - 5.3 mmol/L    Chloride 107 98 - 107 mmol/L    Carbon Dioxide (CO2) 22 22 - 29 mmol/L    Anion Gap 10 7 - 15 mmol/L    Urea Nitrogen 9.8 6.0 - 20.0 mg/dL    Creatinine 0.52 0.51 - 0.95 mg/dL    GFR Estimate  >90 >60 mL/min/1.73m2    Calcium 9.0 8.6 - 10.0 mg/dL    Glucose 97 70 - 99 mg/dL   Reticulocyte count    Collection Time: 12/28/23 11:11 AM   Result Value Ref Range    % Reticulocyte 14.6 (H) 0.5 - 2.0 %    Absolute Reticulocyte 0.385 (H) 0.025 - 0.095 10e6/uL   Ferritin    Collection Time: 12/28/23 11:11 AM   Result Value Ref Range    Ferritin 5,430 (H) 6 - 175 ng/mL   CBC with platelets and differential    Collection Time: 12/28/23 11:11 AM   Result Value Ref Range    WBC Count 14.1 (H) 4.0 - 11.0 10e3/uL    RBC Count 2.64 (L) 3.80 - 5.20 10e6/uL    Hemoglobin 7.9 (L) 11.7 - 15.7 g/dL    Hematocrit 22.3 (L) 35.0 - 47.0 %    MCV 85 78 - 100 fL    MCH 29.9 26.5 - 33.0 pg    MCHC 35.4 31.5 - 36.5 g/dL    RDW 21.5 (H) 10.0 - 15.0 %    Platelet Count 420 150 - 450 10e3/uL    % Neutrophils 73 %    % Lymphocytes 13 %    % Monocytes 8 %    % Eosinophils 4 %    % Basophils 2 %    % Immature Granulocytes 0 %    NRBCs per 100 WBC 1 (H) <1 /100    Absolute Neutrophils 10.3 (H) 1.6 - 8.3 10e3/uL    Absolute Lymphocytes 1.8 0.8 - 5.3 10e3/uL    Absolute Monocytes 1.1 0.0 - 1.3 10e3/uL    Absolute Eosinophils 0.6 0.0 - 0.7 10e3/uL    Absolute Basophils 0.2 0.0 - 0.2 10e3/uL    Absolute Immature Granulocytes 0.1 <=0.4 10e3/uL    Absolute NRBCs 0.1 10e3/uL       Assessment and Plan:  1. Sickle Cell HgbSS Disease  2. Acute pain crises  3. Chronic Pain  4. 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 History of CVA  7. Hearing loss    Jennifer is seen for routine sickle cell follow-up today. She has been experiencing an increase in sickle cell pain over the past 2 months leading to increased ED visits. She continues to pursue infusion room visits for IV hydration and pain control although some weeks is only able to get in for one infusion due to capacity. Due to this increased pain we have not recently been able to move forward with further tapering of oxycodone although this is still  very much a goal of Jennifer's. A left upper extremity US was performed on 12/20/23 to rule out DVT which was negative.     Her breathing and cough are improving. She does not currently feel that her asthma is uncontrolled. Pulmonology/asthma follow-up is scheduled next month to ensure she does not need any medication changes.     I will discuss resumption of IV desferal with Dr. Duncan, however, we discussed it may be appropriate to take a longer break from treatment and continue Jadenu given improvement on her last Ferriscan from September 2023 as well as due to ototoxicity and ocular risks with chronic treatment.    She will receive IV fluids and pain medication along with sidney today. As we move in to the near year, Jennifer is focused on her goal to decrease ED visits and stay on top of her pain control. We will continue to readdress or shared goal of reducing oxycodone use moving forward although have no plans for prescription adjustment today.     We do need to make sure she gets back into audiology and ophthalmology annually due to chelator. Last eye exam was recently completed on 10/25/23. Audiology visit scheduled 2/7/24.      Plan:  -Pulmonology follow-up as planned January 2024  -Continue Hydrea to 3000mg daily to help lessen frequency of sickle cell pain.   -Continue monthly crizanlizumab infusions.  -Continue slow taper of oxycodone. Currently receiving oxycodone 10 mg every 4 hours PRN, qty 25. Recommend tapering to qty 20 with next refill.  -She can self-reduce infusion days/week and we will continue to keep the cap at 2/week for now.  -Continue infusion center visits limited to two times per week (Mondays and Fridays ideally although still needs to call to request). Continue diligent home management with current medications, heat, rest, compression, warm baths. Methocarbamol was recently added which Jennifer is utilizing and finds helpful.  -If unable to manage at home can go to ED  with continued plan to  use IV Dilaudid and take a break from ketamine (10/2/23). No PCA use and goal for any admission would still be to discharge by 5 days or less  - Desferal infusions on hold. Continue Jadenu. Repeat Ferriscan in 4-6 months (January-March 2024). Will discuss possible resumption with Dr. Duncan.  -Reviewed tactics to control anxiety. I have hesitancy to prescribe Xanax even if she has used this in the past due to her concurrent use with oxycodone. Suggested trial of selective serotonin reuptake inhibitor or could pursue psychotherapy. Will also discuss with Dr. Duncan.  -Continue aspirin BID  -RTC with me in 2 months     60 minutes spent on the date of the encounter doing chart review, review of test results, interpretation of tests, patient visit, and documentation     Patricia Mantilla, CNP

## 2023-12-28 NOTE — PATIENT INSTRUCTIONS
Contact Numbers    [facility] [place] [facility]: [phone]    Call with chills and/or temperature greater than or equal to 100.5, uncontrolled nausea/vomiting, diarrhea, constipation, dizziness, shortness of breath, chest pain, bleeding, unexplained bruising, or any new/concerning symptoms, questions/concerns.     If you are having any concerning symptoms or wish to speak to a provider before your next infusion visit, please call your care coordinator or triage to notify them so we can adequately serve you.       After Hours: [phone]    If after hours, weekends, or holidays, call main hospital  and ask for Oncology doctor on call.      December 2023 Sunday Monday Tuesday Wednesday Thursday Friday Saturday                            1    LAB CENTRAL  10:30 AM   (15 min.)    [facility] DRAW   [facility]    ONC INFUSION 4 HR (240 MIN)  11:00 AM   (240 min.)    ONC INFUSION NURSE   [facility] 2       3    Admission   4:49 AM   Court Ring MD   [facility]   (Discharge: 12/3/2023) 4    BMT INFUSION 3 HR (180 MIN)  10:30 AM   (180 min.)   [facility] BMT INFUSION NURSE   [facility] [place] 5     6    BMT INFUSION 3 HR (180 MIN)  11:30 AM   (180 min.)   [facility] BMT INFUSION NURSE   [facility] [place] 7     8    Admission   2:09 AM   Huang Domingo MD   [facility]   (Discharge: 12/8/2023)    XR CHEST 2 VIEWS   3:05 AM   (20 min.)   [id number]   [facility] 9       10     11    SPEC INFUSION 2 HR (120 MIN)   3:30 PM   (120 min.)   UC SIPC INFUSION NURSE   [facility] [place] 12     13     14    BMT INFUSION 3 HR (180 MIN)   1:00 PM   (180 min.)   [facility] BMT INFUSION NURSE   [facility] [place]  15     16    Admission   1:13 AM   Mykel Luz DO   [facility]   (Discharge: 12/16/2023)   17     18    ONC INFUSION 3 HR (180 MIN)   1:30 PM   (180 min.)   UC ONC INFUSION NURSE   [facility] 19     20    ONC INFUSION 3 HR (180 MIN)   1:30 PM   (180 min.)   UC ONC INFUSION NURSE   [facility]    US VENOUS   4:25 PM   (30 min.)   [id number]   [facility] 21     22    Admission  10:32 PM   Jesica Michael MD   [facility]   (Discharge: 12/23/2023) 23       24     25     26     27     28    LAB CENTRAL  11:00 AM   (15 min.)   [facility]   [facility]    RETURN ACTIVE TREATMENT  11:15 AM   (45 min.)   Patricia Mantilla CNP   [facility]    ONC INFUSION 4 HR (240 MIN)  12:30 PM   (240 min.)    ONC INFUSION NURSE   [facility] 29 30 31 January 2024 Sunday Monday Tuesday Wednesday Thursday Friday Saturday        1     2     3     4    NEUROTOXIN   9:45 AM   (60 min.)   Katherine Pierce MD   [facility] 5     6       7     8     9     10     11     12    RETURN ASTHMA   4:05 PM   (40 min.)   Jake Harvey MD   [facility] 13       14     15     16     17     18     19     20       21     22     23     24     25     26    LAB CENTRAL  11:00 AM   (15 min.)    [facility]RAW   [facility]    ONC INFUSION 4 HR (240 MIN)  11:30 AM   (240 min.)    ONC INFUSION NURSE   [facility] 27       28     29     30     31                                    Lab Results:  Recent Results (from the past 12 hour(s))   Basic metabolic panel    Collection Time: 12/28/23  11:11 AM   Result Value Ref Range    Sodium 139 135 - 145 mmol/L    Potassium 3.8 3.4 - 5.3 mmol/L    Chloride 107 98 - 107 mmol/L    Carbon Dioxide (CO2) 22 22 - 29 mmol/L    Anion Gap 10 7 - 15 mmol/L    Urea Nitrogen 9.8 6.0 - 20.0 mg/dL    Creatinine 0.52 0.51 - 0.95 mg/dL    GFR Estimate >90 >60 mL/min/1.73m2    Calcium 9.0 8.6 - 10.0 mg/dL    Glucose 97 70 - 99 mg/dL   Reticulocyte count    Collection Time: 12/28/23 11:11 AM   Result Value Ref Range    % Reticulocyte 14.6 (H) 0.5 - 2.0 %    Absolute Reticulocyte 0.385 (H) 0.025 - 0.095 10e6/uL   Ferritin    Collection Time: 12/28/23 11:11 AM   Result Value Ref Range    Ferritin 5,430 (H) 6 - 175 ng/mL   CBC with platelets and differential    Collection Time: 12/28/23 11:11 AM   Result Value Ref Range    WBC Count 14.1 (H) 4.0 - 11.0 10e3/uL    RBC Count 2.64 (L) 3.80 - 5.20 10e6/uL    Hemoglobin 7.9 (L) 11.7 - 15.7 g/dL    Hematocrit 22.3 (L) 35.0 - 47.0 %    MCV 85 78 - 100 fL    MCH 29.9 26.5 - 33.0 pg    MCHC 35.4 31.5 - 36.5 g/dL    RDW 21.5 (H) 10.0 - 15.0 %    Platelet Count 420 150 - 450 10e3/uL    % Neutrophils 73 %    % Lymphocytes 13 %    % Monocytes 8 %    % Eosinophils 4 %    % Basophils 2 %    % Immature Granulocytes 0 %    NRBCs per 100 WBC 1 (H) <1 /100    Absolute Neutrophils 10.3 (H) 1.6 - 8.3 10e3/uL    Absolute Lymphocytes 1.8 0.8 - 5.3 10e3/uL    Absolute Monocytes 1.1 0.0 - 1.3 10e3/uL    Absolute Eosinophils 0.6 0.0 - 0.7 10e3/uL    Absolute Basophils 0.2 0.0 - 0.2 10e3/uL    Absolute Immature Granulocytes 0.1 <=0.4 10e3/uL    Absolute NRBCs 0.1 10e3/uL

## 2023-12-28 NOTE — NURSING NOTE
"Oncology Rooming Note    December 28, 2023 11:16 AM   Jennifer Cervantes is a 23 year old female who presents for:    Chief Complaint   Patient presents with    Oncology Clinic Visit     Sickle cell anemia     Port Draw     Labs drawn via port by RN.      Initial Vitals: /63 (BP Location: Right arm, Patient Position: Sitting, Cuff Size: Adult Regular)   Pulse 97   Temp 98.3  F (36.8  C) (Oral)   Resp 19   Wt 68 kg (149 lb 14.4 oz)   SpO2 97%   BMI 25.73 kg/m   Estimated body mass index is 25.73 kg/m  as calculated from the following:    Height as of 12/22/23: 1.626 m (5' 4\").    Weight as of this encounter: 68 kg (149 lb 14.4 oz). Body surface area is 1.75 meters squared.  Extreme Pain (9) Comment: Data Unavailable   No LMP recorded. Patient has had an implant.  Allergies reviewed: Yes  Medications reviewed: Yes    Medications: MEDICATION REFILLS NEEDED TODAY. Provider was notified.  Pharmacy name entered into [facility]: [facility] [place], [place] - [address] [facility] [id number]    Frailty Screening:   Is the patient here for a new oncology consult visit in cancer care? 2. No      Clinical concerns: hydroxyurea, aspirin, jadenu refills needed        Tiffany Robertson"

## 2023-12-29 ENCOUNTER — INFUSION THERAPY VISIT (OUTPATIENT)
Dept: TRANSPLANT | Facility: CLINIC | Age: 23
End: 2023-12-29
Attending: PEDIATRICS
Payer: COMMERCIAL

## 2023-12-29 ENCOUNTER — NURSE TRIAGE (OUTPATIENT)
Dept: ONCOLOGY | Facility: CLINIC | Age: 23
End: 2023-12-29
Payer: COMMERCIAL

## 2023-12-29 ENCOUNTER — PATIENT OUTREACH (OUTPATIENT)
Dept: CARE COORDINATION | Facility: CLINIC | Age: 23
End: 2023-12-29
Payer: COMMERCIAL

## 2023-12-29 VITALS
DIASTOLIC BLOOD PRESSURE: 74 MMHG | HEART RATE: 92 BPM | TEMPERATURE: 98.3 F | SYSTOLIC BLOOD PRESSURE: 153 MMHG | RESPIRATION RATE: 16 BRPM | OXYGEN SATURATION: 92 %

## 2023-12-29 DIAGNOSIS — D57.00 SICKLE CELL PAIN CRISIS (H): ICD-10-CM

## 2023-12-29 DIAGNOSIS — D57.00 SICKLE CELL PAIN CRISIS (H): Primary | ICD-10-CM

## 2023-12-29 DIAGNOSIS — G81.10 SPASTIC HEMIPLEGIA, UNSPECIFIED ETIOLOGY, UNSPECIFIED LATERALITY (H): ICD-10-CM

## 2023-12-29 PROCEDURE — 96375 TX/PRO/DX INJ NEW DRUG ADDON: CPT

## 2023-12-29 PROCEDURE — 258N000003 HC RX IP 258 OP 636: Performed by: PEDIATRICS

## 2023-12-29 PROCEDURE — 250N000013 HC RX MED GY IP 250 OP 250 PS 637: Performed by: PEDIATRICS

## 2023-12-29 PROCEDURE — 96376 TX/PRO/DX INJ SAME DRUG ADON: CPT

## 2023-12-29 PROCEDURE — 96361 HYDRATE IV INFUSION ADD-ON: CPT

## 2023-12-29 PROCEDURE — 250N000011 HC RX IP 250 OP 636: Performed by: PEDIATRICS

## 2023-12-29 PROCEDURE — 96374 THER/PROPH/DIAG INJ IV PUSH: CPT

## 2023-12-29 RX ORDER — HEPARIN SODIUM (PORCINE) LOCK FLUSH IV SOLN 100 UNIT/ML 100 UNIT/ML
5 SOLUTION INTRAVENOUS
Status: DISCONTINUED | OUTPATIENT
Start: 2023-12-29 | End: 2023-12-29 | Stop reason: HOSPADM

## 2023-12-29 RX ORDER — DIPHENHYDRAMINE HCL 25 MG
25 CAPSULE ORAL
Status: CANCELLED
Start: 2024-01-01

## 2023-12-29 RX ORDER — ONDANSETRON 4 MG/1
8 TABLET, FILM COATED ORAL
Status: CANCELLED
Start: 2024-01-01

## 2023-12-29 RX ORDER — HEPARIN SODIUM,PORCINE 10 UNIT/ML
5 VIAL (ML) INTRAVENOUS
Status: CANCELLED | OUTPATIENT
Start: 2024-01-01

## 2023-12-29 RX ORDER — KETOROLAC TROMETHAMINE 30 MG/ML
30 INJECTION, SOLUTION INTRAMUSCULAR; INTRAVENOUS ONCE
Status: COMPLETED | OUTPATIENT
Start: 2023-12-29 | End: 2023-12-29

## 2023-12-29 RX ORDER — OXYCODONE HYDROCHLORIDE 10 MG/1
10 TABLET ORAL EVERY 4 HOURS PRN
Qty: 20 TABLET | Refills: 0 | Status: SHIPPED | OUTPATIENT
Start: 2023-12-29 | End: 2024-01-04

## 2023-12-29 RX ORDER — HEPARIN SODIUM (PORCINE) LOCK FLUSH IV SOLN 100 UNIT/ML 100 UNIT/ML
5 SOLUTION INTRAVENOUS
Status: CANCELLED | OUTPATIENT
Start: 2024-01-01

## 2023-12-29 RX ORDER — KETOROLAC TROMETHAMINE 30 MG/ML
30 INJECTION, SOLUTION INTRAMUSCULAR; INTRAVENOUS ONCE
Status: CANCELLED
Start: 2024-01-01 | End: 2024-01-01

## 2023-12-29 RX ORDER — DIPHENHYDRAMINE HCL 25 MG
25 CAPSULE ORAL
Status: COMPLETED | OUTPATIENT
Start: 2023-12-29 | End: 2023-12-29

## 2023-12-29 RX ORDER — ONDANSETRON 8 MG/1
8 TABLET, ORALLY DISINTEGRATING ORAL
Status: COMPLETED | OUTPATIENT
Start: 2023-12-29 | End: 2023-12-29

## 2023-12-29 RX ADMIN — HYDROMORPHONE HYDROCHLORIDE 1 MG: 1 INJECTION, SOLUTION INTRAMUSCULAR; INTRAVENOUS; SUBCUTANEOUS at 11:56

## 2023-12-29 RX ADMIN — SODIUM CHLORIDE, POTASSIUM CHLORIDE, SODIUM LACTATE AND CALCIUM CHLORIDE 1000 ML: 600; 310; 30; 20 INJECTION, SOLUTION INTRAVENOUS at 11:55

## 2023-12-29 RX ADMIN — DIPHENHYDRAMINE HYDROCHLORIDE 25 MG: 25 CAPSULE ORAL at 11:56

## 2023-12-29 RX ADMIN — Medication 5 ML: at 14:32

## 2023-12-29 RX ADMIN — HYDROMORPHONE HYDROCHLORIDE 1 MG: 1 INJECTION, SOLUTION INTRAMUSCULAR; INTRAVENOUS; SUBCUTANEOUS at 13:00

## 2023-12-29 RX ADMIN — KETOROLAC TROMETHAMINE 30 MG: 30 INJECTION, SOLUTION INTRAMUSCULAR; INTRAVENOUS at 11:56

## 2023-12-29 RX ADMIN — ONDANSETRON 8 MG: 8 TABLET, ORALLY DISINTEGRATING ORAL at 11:56

## 2023-12-29 RX ADMIN — HYDROMORPHONE HYDROCHLORIDE 1 MG: 1 INJECTION, SOLUTION INTRAMUSCULAR; INTRAVENOUS; SUBCUTANEOUS at 13:57

## 2023-12-29 NOTE — TELEPHONE ENCOUNTER
Oncology Nurse Triage - Sickle Cell Pain Crisis:  Situation: Jennifer  calling about Sickle Cell Pain Crisis, requesting to be added on for IV fluids and pain medicine    Background:   Patient's last infusion was 12/28/23  Last clinic visit date:12/28/23 with Patricia Mantilla CNP  Does patient have active treatment plan?  Yes    Assessment of Symptoms:  Onset/Duration of symptoms: had infusion yesterday for it. It went away, but got SERA yesterday, started back up late night about 8-9 p.m.    Is it typical sickle cell pain? Yes   Location: L arm   Character: Sharp           Intensity: 8/10    Any radiation of pain, numbness, tingling, weakness, warmth, swelling, discoloration of arms or legs?No     Fever?No  Chest Pain Present: No   Shortness of breath: No     Other home therapies tried: HEAT/HEATING PAD and WARM BATH   Last home medication taken and when: 0600: oxycodone    Any Refills Needed?: Yes Oxycodone, no se, not out of med but will be out today; taking it 1 Q 4H, yes controlling pain, pended in separate refill encounter.    Does patient have transportation & length of time to get to clinic: No 15 minutes    Recommendations:   If you do not hear from the infusion center by 2pm then you will not be able to get in for an infusion today. If symptoms worsen while waiting for call back, and/or you experience fever, chills, SOB, chest pain, cough, n/v, dizziness, numbness, swelling, discoloration of extremities, then seek emergency evaluation in Emergency Department.     Pt voiced understanding.  Pt added to infusion wait list.

## 2023-12-29 NOTE — TELEPHONE ENCOUNTER
Narcotic Refill Request  Person Requesting Refill:Jennifer    Medication(s) requested:  oxycodone IR 10 mg tablet  Last fill date and by whom:  12/22/23 by Dr. Duncan  What pain is the medication treating: sickle cell pain  How is the medication being taken?:1 tab Q 4H  Does pt have enough for today? No. She will run out of medication today  Is pain being adequately controlled on the current regimen?: yes  Experiencing any side effects from medication?: no    Date of most recent appointment:  12/28/23 with Patricia Mantilla CNP  Any No Show Visits:No  Next appointment:   2/23/24 with Patricia Mantilla CNP     Reviewed: No    Routed/Paged provider: Pended and Routed to Dr. Thornton as Dr. Duncan and Patricia Mantilla CNP are out of the office today.

## 2023-12-29 NOTE — NURSING NOTE
"Oncology Rooming Note    December 29, 2023 12:24 PM   Jennifer Cervantes is a 23 year old female who presents for:    Chief Complaint   Patient presents with    Infusion     Add on infusion for pain medications and IV fluids. Hx sickle cell disease.     Initial Vitals: BP (!) 153/74   Pulse 92   Temp 98.3  F (36.8  C) (Oral)   Resp 16   SpO2 92%  Estimated body mass index is 25.73 kg/m  as calculated from the following:    Height as of 12/22/23: 1.626 m (5' 4\").    Weight as of 12/28/23: 68 kg (149 lb 14.4 oz). There is no height or weight on file to calculate BSA.  Extreme Pain (9) Comment: Data Unavailable   No LMP recorded. Patient has had an implant.  Allergies reviewed: Yes  Medications reviewed: Yes    Medications: Medication refills not needed today.  Pharmacy name entered into [employer]: [facility] [place], [place] - [address] [place] [address] [phone]    Frailty Screening:   Is the patient here for a new oncology consult visit in cancer care? 2. No      Clinical concerns: none       Jamaica Napoles RN              "

## 2023-12-29 NOTE — TELEPHONE ENCOUNTER
BMT can offer apt to pt at 1200  Called pt who confirmed she can come to apt today at noon.  Message sent to SW to assist with transportation.  Updated Infusion Charge Nurse.  Message sent to CCOD to schedule at 1200.    Message routed to Care Team.

## 2023-12-29 NOTE — TELEPHONE ENCOUNTER
Jennifer is calling because she called pharmacy and they did not have oxy rx. Just took last tab. Pt states she is completely out of medication.   6045: Paged Dr. Thornton to ask her to review and sign.

## 2023-12-29 NOTE — PROGRESS NOTES
Social Work - Transportation  [facility]    Data/Intervention:  Patient Name: Jennifer Cervantes Goes By: Jennifer    /Age: 2000 (23 year old)    Referral From: [facility]  Reason for Referral:  support requested for patient transportation needs for today's appointment.  Assessment:  called [employer] to arrange ride through patient's insurance. [employer] arranged  for patient from home with [employer]. Patient will need to call [phone] when ready for return ride home.  Plan: Patient is aware of the transportation plan.  available to assist with any other needs.    CARLOS Chavez,LGUDAY  Hematology/Oncology Social Worker  Phone:[phone] Pager: [phone]

## 2023-12-31 ENCOUNTER — APPOINTMENT (OUTPATIENT)
Dept: GENERAL RADIOLOGY | Facility: CLINIC | Age: 23
End: 2023-12-31
Attending: EMERGENCY MEDICINE
Payer: COMMERCIAL

## 2023-12-31 ENCOUNTER — HOSPITAL ENCOUNTER (EMERGENCY)
Facility: CLINIC | Age: 23
Discharge: HOME | End: 2023-12-31
Attending: EMERGENCY MEDICINE | Admitting: EMERGENCY MEDICINE
Payer: COMMERCIAL

## 2023-12-31 VITALS
OXYGEN SATURATION: 91 % | DIASTOLIC BLOOD PRESSURE: 57 MMHG | HEART RATE: 88 BPM | RESPIRATION RATE: 21 BRPM | TEMPERATURE: 98.6 F | SYSTOLIC BLOOD PRESSURE: 121 MMHG

## 2023-12-31 DIAGNOSIS — D57.00 SICKLE CELL PAIN CRISIS (H): ICD-10-CM

## 2023-12-31 LAB
ATRIAL RATE - MUSE: 96 BPM
BASOPHILS # BLD AUTO: 0.2 10E3/UL (ref 0–0.2)
BASOPHILS NFR BLD AUTO: 2 %
D DIMER PPP FEU-MCNC: 1.03 UG/ML FEU (ref 0–0.5)
DIASTOLIC BLOOD PRESSURE - MUSE: NORMAL MMHG
EOSINOPHIL # BLD AUTO: 0.5 10E3/UL (ref 0–0.7)
EOSINOPHIL NFR BLD AUTO: 3 %
ERYTHROCYTE [DISTWIDTH] IN BLOOD BY AUTOMATED COUNT: 25.2 % (ref 10–15)
FLUAV RNA SPEC QL NAA+PROBE: NEGATIVE
FLUBV RNA RESP QL NAA+PROBE: NEGATIVE
HCG SERPL QL: NEGATIVE
HCT VFR BLD AUTO: 20.5 % (ref 35–47)
HGB BLD-MCNC: 7.4 G/DL (ref 11.7–15.7)
IMM GRANULOCYTES # BLD: 0.1 10E3/UL
IMM GRANULOCYTES NFR BLD: 0 %
INTERPRETATION ECG - MUSE: NORMAL
LYMPHOCYTES # BLD AUTO: 2.8 10E3/UL (ref 0.8–5.3)
LYMPHOCYTES NFR BLD AUTO: 20 %
MCH RBC QN AUTO: 30.8 PG (ref 26.5–33)
MCHC RBC AUTO-ENTMCNC: 36.1 G/DL (ref 31.5–36.5)
MCV RBC AUTO: 85 FL (ref 78–100)
MONOCYTES # BLD AUTO: 1.2 10E3/UL (ref 0–1.3)
MONOCYTES NFR BLD AUTO: 8 %
NEUTROPHILS # BLD AUTO: 9.6 10E3/UL (ref 1.6–8.3)
NEUTROPHILS NFR BLD AUTO: 67 %
NRBC # BLD AUTO: 0.3 10E3/UL
NRBC BLD AUTO-RTO: 2 /100
P AXIS - MUSE: 74 DEGREES
PLATELET # BLD AUTO: 424 10E3/UL (ref 150–450)
PR INTERVAL - MUSE: 158 MS
QRS DURATION - MUSE: 72 MS
QT - MUSE: 368 MS
QTC - MUSE: 464 MS
R AXIS - MUSE: 47 DEGREES
RBC # BLD AUTO: 2.4 10E6/UL (ref 3.8–5.2)
RSV RNA SPEC NAA+PROBE: NEGATIVE
SARS-COV-2 RNA RESP QL NAA+PROBE: NEGATIVE
SYSTOLIC BLOOD PRESSURE - MUSE: NORMAL MMHG
T AXIS - MUSE: 7 DEGREES
TROPONIN T SERPL HS-MCNC: <6 NG/L
VENTRICULAR RATE- MUSE: 96 BPM
WBC # BLD AUTO: 14.3 10E3/UL (ref 4–11)

## 2023-12-31 PROCEDURE — 96376 TX/PRO/DX INJ SAME DRUG ADON: CPT | Performed by: EMERGENCY MEDICINE

## 2023-12-31 PROCEDURE — 85025 COMPLETE CBC W/AUTO DIFF WBC: CPT | Performed by: EMERGENCY MEDICINE

## 2023-12-31 PROCEDURE — 84484 ASSAY OF TROPONIN QUANT: CPT | Performed by: EMERGENCY MEDICINE

## 2023-12-31 PROCEDURE — 258N000003 HC RX IP 258 OP 636: Performed by: EMERGENCY MEDICINE

## 2023-12-31 PROCEDURE — 250N000011 HC RX IP 250 OP 636: Performed by: EMERGENCY MEDICINE

## 2023-12-31 PROCEDURE — 93005 ELECTROCARDIOGRAM TRACING: CPT | Performed by: EMERGENCY MEDICINE

## 2023-12-31 PROCEDURE — 96361 HYDRATE IV INFUSION ADD-ON: CPT | Performed by: EMERGENCY MEDICINE

## 2023-12-31 PROCEDURE — 85379 FIBRIN DEGRADATION QUANT: CPT | Performed by: EMERGENCY MEDICINE

## 2023-12-31 PROCEDURE — 99285 EMERGENCY DEPT VISIT HI MDM: CPT | Mod: 25 | Performed by: EMERGENCY MEDICINE

## 2023-12-31 PROCEDURE — 93010 ELECTROCARDIOGRAM REPORT: CPT | Performed by: EMERGENCY MEDICINE

## 2023-12-31 PROCEDURE — 87637 SARSCOV2&INF A&B&RSV AMP PRB: CPT | Mod: 59 | Performed by: EMERGENCY MEDICINE

## 2023-12-31 PROCEDURE — 36592 COLLECT BLOOD FROM PICC: CPT | Performed by: EMERGENCY MEDICINE

## 2023-12-31 PROCEDURE — 84703 CHORIONIC GONADOTROPIN ASSAY: CPT | Performed by: EMERGENCY MEDICINE

## 2023-12-31 PROCEDURE — 96375 TX/PRO/DX INJ NEW DRUG ADDON: CPT | Performed by: EMERGENCY MEDICINE

## 2023-12-31 PROCEDURE — 96374 THER/PROPH/DIAG INJ IV PUSH: CPT | Performed by: EMERGENCY MEDICINE

## 2023-12-31 PROCEDURE — 71046 X-RAY EXAM CHEST 2 VIEWS: CPT

## 2023-12-31 RX ORDER — ONDANSETRON 2 MG/ML
8 INJECTION INTRAMUSCULAR; INTRAVENOUS ONCE
Status: COMPLETED | OUTPATIENT
Start: 2023-12-31 | End: 2023-12-31

## 2023-12-31 RX ORDER — KETOROLAC TROMETHAMINE 30 MG/ML
30 INJECTION, SOLUTION INTRAMUSCULAR; INTRAVENOUS ONCE
Status: COMPLETED | OUTPATIENT
Start: 2023-12-31 | End: 2023-12-31

## 2023-12-31 RX ORDER — HEPARIN SODIUM (PORCINE) LOCK FLUSH IV SOLN 100 UNIT/ML 100 UNIT/ML
5 SOLUTION INTRAVENOUS ONCE
Status: COMPLETED | OUTPATIENT
Start: 2023-12-31 | End: 2023-12-31

## 2023-12-31 RX ADMIN — ONDANSETRON 8 MG: 2 INJECTION INTRAMUSCULAR; INTRAVENOUS at 02:56

## 2023-12-31 RX ADMIN — HYDROMORPHONE HYDROCHLORIDE 1 MG: 1 INJECTION, SOLUTION INTRAMUSCULAR; INTRAVENOUS; SUBCUTANEOUS at 03:54

## 2023-12-31 RX ADMIN — HYDROMORPHONE HYDROCHLORIDE 1 MG: 1 INJECTION, SOLUTION INTRAMUSCULAR; INTRAVENOUS; SUBCUTANEOUS at 05:03

## 2023-12-31 RX ADMIN — SODIUM CHLORIDE, POTASSIUM CHLORIDE, SODIUM LACTATE AND CALCIUM CHLORIDE 1000 ML: 600; 310; 30; 20 INJECTION, SOLUTION INTRAVENOUS at 02:38

## 2023-12-31 RX ADMIN — HYDROMORPHONE HYDROCHLORIDE 1 MG: 1 INJECTION, SOLUTION INTRAMUSCULAR; INTRAVENOUS; SUBCUTANEOUS at 02:50

## 2023-12-31 RX ADMIN — HEPARIN 5 ML: 100 SYRINGE at 05:06

## 2023-12-31 RX ADMIN — KETOROLAC TROMETHAMINE 30 MG: 30 INJECTION, SOLUTION INTRAMUSCULAR; INTRAVENOUS at 02:53

## 2023-12-31 ASSESSMENT — ACTIVITIES OF DAILY LIVING (ADL)
ADLS_ACUITY_SCORE: 35
ADLS_ACUITY_SCORE: 35

## 2023-12-31 NOTE — ED TRIAGE NOTES
Pt c/o chest pain, left groin pain and shortness of breath started today. Pt h/o sickle cell.

## 2023-12-31 NOTE — ED PROVIDER NOTES
[facility] EMERGENCY DEPARTMENT ([facility])    12/31/23      ED PROVIDER NOTE      History     Chief Complaint   Patient presents with    Sickle Cell Pain Crisis     Pt c/o chest pain, left groin pain and shortness of breath started today. Pt h/o sickle cell.          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  Symptoms started over the past day.  Denies fevers, chills, cough.  This feels similar to her sickle cell crises.  No known sick contacts.  She does report some chest pain with breathing.  No leg pain or swelling.  She is not on blood thinners.    Of note, patient has Care plan listed in Snapshot.     Past Medical History  Past Medical History:   Diagnosis Date    Anxiety     Bleeding disorder (H24)     Blood clotting disorder (H24)     Cerebral infarction (H) 2015    Cognitive developmental delay     low IQ. 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 side (H)     right hand contractures    Iron overload due to repeated red blood cell transfusions     Migraines     Multiple subsegmental pulmonary emboli without acute cor pulmonale (H) 02/01/2021    Oppositional defiant behavior     Presence of intrauterine contraceptive device 2/18/2020    Superficial venous thrombosis of arm, right 03/25/2021    Uncomplicated asthma      Past Surgical History:   Procedure Laterality Date    AS INSERT TUNNELED CV 2 CATH W/O PORT/PUMP      CHOLECYSTECTOMY      CV RIGHT HEART CATH MEASUREMENTS RECORDED N/A 11/18/2021    Procedure: Right Heart Cath;  Surgeon: Jackson Stauffer MD;  Location:  HEART CARDIAC CATH LAB    INSERT PORT VASCULAR ACCESS Left  4/21/2021    Procedure: INSERTION, VASCULAR ACCESS PORT (NOT SURE ON SIDE UNTIL REMOVAL);  Surgeon: Rajan More MD;  Location: [facility] OR    IR CHEST PORT PLACEMENT > 5 YRS OF AGE  4/21/2021    IR CVC NON TUNNEL LINE REMOVAL  5/6/2021    IR CVC NON TUNNEL PLACEMENT > 5 YRS  04/07/2020    IR CVC NON TUNNEL PLACEMENT > 5 YRS  4/30/2021    IR CVC NON TUNNEL PLACEMENT > 5 YRS  9/7/2022    IR PORT REMOVAL LEFT  4/21/2021    REMOVE PORT VASCULAR ACCESS Left 4/21/2021    Procedure: REMOVAL, VASCULAR ACCESS PORT LEFT;  Surgeon: Rajan More MD;  Location: [facility] OR    REPAIR TENDON ELBOW Right 10/02/2019    Procedure: Right Elbow Flexor Lengthening, Flexor Pronator Slide Of Wrist and Finger, Thumb Adductor Lengthening;  Surgeon: Anai Franco MD;  Location: [facility] OR    TONSILLECTOMY Bilateral 10/02/2019    Procedure: Bilateral Tonsillectomy;  Surgeon: Farhana Guy MD;  Location: [facility] OR    ZZC BREAST REDUCTION (INCLUDES LIPO) TIER 3 Bilateral 04/23/2019     acetaminophen (TYLENOL) 325 MG tablet  albuterol (PROAIR HFA/PROVENTIL HFA/VENTOLIN HFA) 108 (90 Base) MCG/ACT inhaler  albuterol (PROVENTIL) (2.5 MG/3ML) 0.083% neb solution  aspirin (ASA) 81 MG chewable tablet  budesonide-formoterol (SYMBICORT) 160-4.5 MCG/ACT Inhaler  cetirizine (ZYRTEC) 10 MG tablet  deferasirox (JADENU) 360 MG tablet  diphenhydrAMINE (BENADRYL) 25 MG capsule  EPINEPHrine (ANY BX GENERIC EQUIV) 0.3 MG/0.3ML injection 2-pack  Hydroxyurea 1000 MG TABS  methocarbamol (ROBAXIN) 750 MG tablet  naloxone (NARCAN) 4 MG/0.1ML nasal spray  ondansetron (ZOFRAN) 8 MG tablet  oxyCODONE IR (ROXICODONE) 10 MG tablet      Allergies   Allergen Reactions    Contrast Dye      Hives and breathing issues    Fish-Derived Products Hives    Seafood Hives    Adhesive Tape Hives     Primipore dressing causes hives    Gadolinium     Iodinated Contrast Media      Family History  Family History   Problem Relation Age of Onset    Sickle Cell Trait Mother      Hypertension Mother     Asthma Mother     Sickle Cell Trait Father     Glaucoma No family hx of     Macular Degeneration No family hx of     Diabetes No family hx of     Gout No family hx of      Social History   Social History     Tobacco Use    Smoking status: Never     Passive exposure: Never    Smokeless tobacco: Never   Substance Use Topics    Alcohol use: Not Currently     Alcohol/week: 0.0 standard drinks of alcohol    Drug use: Never         A medically appropriate review of systems was performed with pertinent positives and negatives noted in the HPI, and all other systems negative.    Physical Exam      Physical Exam  Physical Exam   Constitutional: oriented to person, place, and time. appears well-developed and well-nourished.   HENT:   Head: Normocephalic and atraumatic.   Neck: Normal range of motion.   Pulmonary/Chest: Effort normal. No respiratory distress.  Clear lungs bilaterally.  Cardiac: No murmurs, rubs, gallops. RRR.  Abdominal: Abdomen soft, nontender, nondistended. No rebound tenderness.  MSK: Long bones without deformity or evidence of trauma.  No extremity edema.  Neurological: alert and oriented to person, place, and time.   Skin: Skin is warm and dry.   Psychiatric:  normal mood and affect.  behavior is normal. Thought content normal.     ED Course, Procedures, & Data      Procedures            EKG Interpretation:      Interpreted by Andrew Chou MD  Time reviewed: 0215  Symptoms at time of EKG: chest pain   Rhythm: normal sinus   Rate: normal  Axis: normal  Ectopy: none  Conduction: normal  ST Segments/ T Waves: No ST-T wave changes  Q Waves: none  Comparison to prior: Unchanged    Clinical Impression: normal EKG                 Results for orders placed or performed during the hospital encounter of 12/31/23   XR Chest 2 Views     Status: None    Narrative    EXAM: XR CHEST 2 VIEWS  LOCATION: [facility]  DATE: 12/31/2023    INDICATION:  chest pain, hx sickle cell crisis  COMPARISON: 12/08/2023.    FINDINGS: Left chest wall port. There is no pneumothorax. The heart size is normal. The lungs are clear. There is no pleural effusion.      Impression    IMPRESSION: No acute abnormality.   D dimer quantitative     Status: Abnormal   Result Value Ref Range    D-Dimer Quantitative 1.03 (H) 0.00 - 0.50 ug/mL FEU    Narrative    This D-dimer assay is intended for use in conjunction with a clinical pretest probability assessment model to exclude pulmonary embolism (PE) and deep venous thrombosis (DVT) in outpatients suspected of PE or DVT. The cut-off value is 0.50 ug/mL FEU.   Troponin T, High Sensitivity     Status: Normal   Result Value Ref Range    Troponin T, High Sensitivity <6 <=14 ng/L   HCG qualitative pregnancy (blood)     Status: Normal   Result Value Ref Range    hCG Serum Qualitative Negative Negative   Asymptomatic Influenza A/B, RSV, & SARS-CoV2 PCR (COVID-19) Nose     Status: Normal    Specimen: Nose; Swab   Result Value Ref Range    Influenza A PCR Negative Negative    Influenza B PCR Negative Negative    RSV PCR Negative Negative    SARS CoV2 PCR Negative Negative    Narrative    Testing was performed using the Xpert Xpress CoV2/Flu/RSV Assay on the [employer] GeneXpert Instrument. This test should be ordered for the detection of SARS-CoV-2, influenza, and RSV viruses in individuals who meet clinical and/or epidemiological criteria. Test performance is unknown in asymptomatic patients. This test is for in vitro diagnostic use under the FDA EUA for laboratories certified under CLIA to perform high or moderate complexity testing. This test has not been FDA cleared or approved. A negative result does not rule out the presence of PCR inhibitors in the specimen or target RNA in concentration below the limit of detection for the assay. If only one viral target is positive but coinfection with multiple targets is suspected, the sample should be  re-tested with another FDA cleared, approved, or authorized test, if coinfection would change clinical management. This test was validated by the [facility]. These laboratories are certified under the Clinical Laboratory Improvement Amendments of 1988 (CLIA-88) as qualified to perform high complexity laboratory testing.   CBC with platelets and differential     Status: Abnormal   Result Value Ref Range    WBC Count 14.3 (H) 4.0 - 11.0 10e3/uL    RBC Count 2.40 (L) 3.80 - 5.20 10e6/uL    Hemoglobin 7.4 (L) 11.7 - 15.7 g/dL    Hematocrit 20.5 (L) 35.0 - 47.0 %    MCV 85 78 - 100 fL    MCH 30.8 26.5 - 33.0 pg    MCHC 36.1 31.5 - 36.5 g/dL    RDW 25.2 (H) 10.0 - 15.0 %    Platelet Count 424 150 - 450 10e3/uL    % Neutrophils 67 %    % Lymphocytes 20 %    % Monocytes 8 %    % Eosinophils 3 %    % Basophils 2 %    % Immature Granulocytes 0 %    NRBCs per 100 WBC 2 (H) <1 /100    Absolute Neutrophils 9.6 (H) 1.6 - 8.3 10e3/uL    Absolute Lymphocytes 2.8 0.8 - 5.3 10e3/uL    Absolute Monocytes 1.2 0.0 - 1.3 10e3/uL    Absolute Eosinophils 0.5 0.0 - 0.7 10e3/uL    Absolute Basophils 0.2 0.0 - 0.2 10e3/uL    Absolute Immature Granulocytes 0.1 <=0.4 10e3/uL    Absolute NRBCs 0.3 10e3/uL   CBC with platelets differential     Status: Abnormal    Narrative    The following orders were created for panel order CBC with platelets differential.  Procedure                               Abnormality         Status                     ---------                               -----------         ------                     CBC with platelets and d...[[id number]]  Abnormal            Final result                 Please view results for these tests on the individual orders.     Medications - No data to display  Labs Ordered and Resulted from Time of ED Arrival to Time of ED Departure - No data to display  No orders to display          Critical care was not performed.     Medical Decision Making  The patient's presentation  was of high complexity (an acute health issue posing potential threat to life or bodily function).    The patient's evaluation involved:  ordering and/or review of 3+ test(s) in this encounter (see separate area of note for details)  independent interpretation of testing performed by another health professional (clear chest x-ray)    The patient's management necessitated moderate risk (limitations due to social determinants of health (high utilizer of the Emergency Department)).    Assessment & Plan    MDM  Patient presenting with symptoms of sickle pain crisis.  Very likely ACS with a normal EKG and troponin.  She does have very mildly elevated D-dimer which is quite chronic for this patient and likely due to the hemolyzing.  She has complete resolution of her symptoms without tachycardia or hypoxia or shortness of breath.  I did order VQ scan for the patient declined this.  After further discussion with the patient she feels fine and never really had much shortness of breath.  The patient will return if she would like to get a VQ scan.  She does have a contrast allergy.  Labs otherwise largely at her baseline.  She is tolerating p.o. intake and appears quite well and is ready for discharge.  She knows she can return if she has any further concerns.    I have reviewed the nursing notes. I have reviewed the findings, diagnosis, plan and need for follow up with the patient.    New Prescriptions    No medications on file       Final diagnoses:   Sickle cell pain crisis (H)       Andrew Chou MD    [facility] EMERGENCY DEPARTMENT  12/31/2023     Andrew Chou MD  12/31/23 0433

## 2023-12-31 NOTE — ED TRIAGE NOTES
Triage Assessment (Adult)       Row Name 12/31/23 0152          Triage Assessment    Airway WDL WDL        Respiratory WDL    Respiratory WDL WDL        Skin Circulation/Temperature WDL    Skin Circulation/Temperature WDL WDL        Cardiac WDL    Cardiac WDL WDL        Peripheral/Neurovascular WDL    Peripheral Neurovascular WDL WDL        Cognitive/Neuro/Behavioral WDL    Cognitive/Neuro/Behavioral WDL WDL        [place] Coma Scale    Best Eye Response 4-->(E4) spontaneous     Best Motor Response 6-->(M6) obeys commands     Best Verbal Response 5-->(V5) oriented     [place] Coma Scale Score 15

## 2024-01-02 ENCOUNTER — INFUSION THERAPY VISIT (OUTPATIENT)
Dept: TRANSPLANT | Facility: CLINIC | Age: 24
End: 2024-01-02
Attending: PEDIATRICS
Payer: COMMERCIAL

## 2024-01-02 ENCOUNTER — NURSE TRIAGE (OUTPATIENT)
Dept: ONCOLOGY | Facility: CLINIC | Age: 24
End: 2024-01-02
Payer: COMMERCIAL

## 2024-01-02 ENCOUNTER — PATIENT OUTREACH (OUTPATIENT)
Dept: CARE COORDINATION | Facility: CLINIC | Age: 24
End: 2024-01-02
Payer: COMMERCIAL

## 2024-01-02 VITALS
DIASTOLIC BLOOD PRESSURE: 81 MMHG | TEMPERATURE: 98.4 F | HEART RATE: 89 BPM | OXYGEN SATURATION: 91 % | SYSTOLIC BLOOD PRESSURE: 126 MMHG | RESPIRATION RATE: 20 BRPM

## 2024-01-02 DIAGNOSIS — G81.10 SPASTIC HEMIPLEGIA, UNSPECIFIED ETIOLOGY, UNSPECIFIED LATERALITY (H): ICD-10-CM

## 2024-01-02 DIAGNOSIS — D57.00 SICKLE CELL PAIN CRISIS (H): Primary | ICD-10-CM

## 2024-01-02 PROCEDURE — 250N000013 HC RX MED GY IP 250 OP 250 PS 637: Performed by: PEDIATRICS

## 2024-01-02 PROCEDURE — 96375 TX/PRO/DX INJ NEW DRUG ADDON: CPT

## 2024-01-02 PROCEDURE — 96361 HYDRATE IV INFUSION ADD-ON: CPT

## 2024-01-02 PROCEDURE — 258N000003 HC RX IP 258 OP 636: Performed by: PEDIATRICS

## 2024-01-02 PROCEDURE — 96374 THER/PROPH/DIAG INJ IV PUSH: CPT

## 2024-01-02 PROCEDURE — 250N000011 HC RX IP 250 OP 636: Performed by: PEDIATRICS

## 2024-01-02 PROCEDURE — 96376 TX/PRO/DX INJ SAME DRUG ADON: CPT

## 2024-01-02 RX ORDER — ONDANSETRON 4 MG/1
8 TABLET, FILM COATED ORAL
Status: CANCELLED
Start: 2024-04-01

## 2024-01-02 RX ORDER — KETOROLAC TROMETHAMINE 30 MG/ML
30 INJECTION, SOLUTION INTRAMUSCULAR; INTRAVENOUS ONCE
Status: COMPLETED | OUTPATIENT
Start: 2024-01-02 | End: 2024-01-02

## 2024-01-02 RX ORDER — DIPHENHYDRAMINE HCL 25 MG
25 CAPSULE ORAL
Status: COMPLETED | OUTPATIENT
Start: 2024-01-02 | End: 2024-01-02

## 2024-01-02 RX ORDER — DIPHENHYDRAMINE HCL 25 MG
25 CAPSULE ORAL
Status: CANCELLED
Start: 2024-04-01

## 2024-01-02 RX ORDER — ONDANSETRON 8 MG/1
8 TABLET, ORALLY DISINTEGRATING ORAL
Status: COMPLETED | OUTPATIENT
Start: 2024-01-02 | End: 2024-01-02

## 2024-01-02 RX ORDER — KETOROLAC TROMETHAMINE 30 MG/ML
30 INJECTION, SOLUTION INTRAMUSCULAR; INTRAVENOUS ONCE
Status: CANCELLED
Start: 2024-04-01 | End: 2024-04-01

## 2024-01-02 RX ORDER — HEPARIN SODIUM,PORCINE 10 UNIT/ML
5 VIAL (ML) INTRAVENOUS
Status: CANCELLED | OUTPATIENT
Start: 2024-04-01

## 2024-01-02 RX ORDER — HEPARIN SODIUM (PORCINE) LOCK FLUSH IV SOLN 100 UNIT/ML 100 UNIT/ML
5 SOLUTION INTRAVENOUS
Status: CANCELLED | OUTPATIENT
Start: 2024-04-01

## 2024-01-02 RX ADMIN — HYDROMORPHONE HYDROCHLORIDE 1 MG: 1 INJECTION, SOLUTION INTRAMUSCULAR; INTRAVENOUS; SUBCUTANEOUS at 10:57

## 2024-01-02 RX ADMIN — ONDANSETRON 8 MG: 8 TABLET, ORALLY DISINTEGRATING ORAL at 10:09

## 2024-01-02 RX ADMIN — DIPHENHYDRAMINE HYDROCHLORIDE 25 MG: 25 CAPSULE ORAL at 10:09

## 2024-01-02 RX ADMIN — KETOROLAC TROMETHAMINE 30 MG: 30 INJECTION, SOLUTION INTRAMUSCULAR; INTRAVENOUS at 10:04

## 2024-01-02 RX ADMIN — SODIUM CHLORIDE, POTASSIUM CHLORIDE, SODIUM LACTATE AND CALCIUM CHLORIDE 1000 ML: 600; 310; 30; 20 INJECTION, SOLUTION INTRAVENOUS at 09:58

## 2024-01-02 RX ADMIN — HYDROMORPHONE HYDROCHLORIDE 1 MG: 1 INJECTION, SOLUTION INTRAMUSCULAR; INTRAVENOUS; SUBCUTANEOUS at 09:58

## 2024-01-02 RX ADMIN — HYDROMORPHONE HYDROCHLORIDE 1 MG: 1 INJECTION, SOLUTION INTRAMUSCULAR; INTRAVENOUS; SUBCUTANEOUS at 12:02

## 2024-01-02 ASSESSMENT — PAIN SCALES - GENERAL: PAINLEVEL: WORST PAIN (10)

## 2024-01-02 NOTE — PROGRESS NOTES
Infusion Nursing Note:  Jennifer Cervantes presents today for pain medications and IV fluids.    Patient seen by provider today: No   present during visit today: Not Applicable.     Note: Pt c/o generalized pain all over 9/10. Pt would like pain down to about 5/10 with interventions.   Pt received a liter of LR at 500 ml/hr for two hours.     Pt received 30 mg IV toradol, 8 mg po zofran, and 25 mg po benadryl.     Pt received 1 mg dilaudid IV every hour for a total of 3 doses (3 mg dilaudid total).   Pt states pain is down to 5/10 upon discharge.        Intravenous Access:  Implanted Port.     Treatment Conditions:  Not Applicable.        Post Infusion Assessment:  Patient tolerated infusion without incident.  Blood return noted pre and post infusion.  Site patent and intact, free from redness, edema or discomfort.  No evidence of extravasations.  Access discontinued per protocol.         Discharge Plan:   Discharge instructions reviewed with: Patient.  Patient and/or family verbalized understanding of discharge instructions and all questions answered.  Patient discharged in stable condition accompanied by: self.  Departure Mode: Ambulatory.

## 2024-01-02 NOTE — TELEPHONE ENCOUNTER
Oncology Nurse Triage - Sickle Cell Pain Crisis:  Situation: Jennifer  calling about Sickle Cell Pain Crisis, requesting to be added on for IV fluids and pain medicine    Background:   Patient's last infusion was 12/29/23 in infusion; 12/31/23 in ED  Last clinic visit date:12/28/23 w/ Patricia Mantilla  Does patient have active treatment plan?  Yes    Assessment of Symptoms:  Onset/Duration of symptoms: 2 day    Is it typical sickle cell pain? Yes   Location: all over  Character: Sharp           Intensity: 10/10    Any radiation of pain, numbness, tingling, weakness, warmth, swelling, discoloration of arms or legs?Yes -pain only, denies others above    Fever?No    Chest Pain Present: No     Shortness of breath: No     Other home therapies tried: HEAT/HEATING PAD and WARM BATH     Last home medication taken and when: 0600 Oxycodone, IBU, methocarbamol    Any Refills Needed?: No     Does patient have transportation & length of time to get to clinic: No -needs transportation.    Recommendations:   Added to infusion wait list.   0706 secure chat to Patricia Mantilla, who approves pt to come in.  0732 call to pt to offer 10am appt in BMT infusion per Radha, charge nurse. Pt accepting.   0735 message sent to scheduling and SW to assist w/ transportation.     If you do not hear from the infusion center by 2pm then you will not be able to get in for an infusion today. If symptoms worsen while waiting for call back, and/or you experience fever, chills, SOB, chest pain, cough, n/v, dizziness, numbness, swelling, discoloration of extremities, then seek emergency evaluation in Emergency Department.     Please note, if you are late for your appt, you risk losing your infusion appt as it may delay another patient's infusion who arrived on time.

## 2024-01-02 NOTE — PROGRESS NOTES
Social Work - Transportation  [facility]    Data/Intervention:  Patient Name: Jennifer Cervantes Goes By: Jennifer    /Age: 2000 (23 year old)    [facility]  Reason for Referral:  support requested for patient transportation needs for today's appointment.  Assessment:  called [employer] to arrange ride through patient's insurance. Per [employer] a ride for today's appointment was already arranged. SW called patient to inform them and they verbalized understanding.   Plan: Patient is aware of the transportation plan.  available to assist with any other needs.    CARLOS Chavez,LGSW  Hematology/Oncology Social Worker  Phone:[phone] Pager: [phone]

## 2024-01-04 ENCOUNTER — PATIENT OUTREACH (OUTPATIENT)
Dept: ONCOLOGY | Facility: CLINIC | Age: 24
End: 2024-01-04

## 2024-01-04 ENCOUNTER — NURSE TRIAGE (OUTPATIENT)
Dept: ONCOLOGY | Facility: CLINIC | Age: 24
End: 2024-01-04
Payer: COMMERCIAL

## 2024-01-04 ENCOUNTER — INFUSION THERAPY VISIT (OUTPATIENT)
Dept: TRANSPLANT | Facility: CLINIC | Age: 24
End: 2024-01-04
Attending: STUDENT IN AN ORGANIZED HEALTH CARE EDUCATION/TRAINING PROGRAM
Payer: COMMERCIAL

## 2024-01-04 ENCOUNTER — OFFICE VISIT (OUTPATIENT)
Dept: ONCOLOGY | Facility: CLINIC | Age: 24
End: 2024-01-04
Attending: STUDENT IN AN ORGANIZED HEALTH CARE EDUCATION/TRAINING PROGRAM
Payer: COMMERCIAL

## 2024-01-04 VITALS
TEMPERATURE: 97.4 F | OXYGEN SATURATION: 94 % | SYSTOLIC BLOOD PRESSURE: 126 MMHG | HEART RATE: 90 BPM | WEIGHT: 149.8 LBS | RESPIRATION RATE: 20 BRPM | BODY MASS INDEX: 25.71 KG/M2 | DIASTOLIC BLOOD PRESSURE: 86 MMHG

## 2024-01-04 VITALS
HEART RATE: 95 BPM | RESPIRATION RATE: 18 BRPM | TEMPERATURE: 97.9 F | DIASTOLIC BLOOD PRESSURE: 85 MMHG | OXYGEN SATURATION: 98 % | SYSTOLIC BLOOD PRESSURE: 135 MMHG

## 2024-01-04 DIAGNOSIS — G81.10 SPASTIC HEMIPLEGIA, UNSPECIFIED ETIOLOGY, UNSPECIFIED LATERALITY (H): Primary | ICD-10-CM

## 2024-01-04 DIAGNOSIS — D57.00 SICKLE CELL PAIN CRISIS (H): ICD-10-CM

## 2024-01-04 DIAGNOSIS — I69.351 HEMIPLEGIA AND HEMIPARESIS FOLLOWING CEREBRAL INFARCTION AFFECTING RIGHT DOMINANT SIDE (H): ICD-10-CM

## 2024-01-04 DIAGNOSIS — G81.10 SPASTIC HEMIPLEGIA, UNSPECIFIED ETIOLOGY, UNSPECIFIED LATERALITY (H): ICD-10-CM

## 2024-01-04 DIAGNOSIS — D57.00 SICKLE CELL PAIN CRISIS (H): Primary | ICD-10-CM

## 2024-01-04 PROCEDURE — 250N000011 HC RX IP 250 OP 636: Performed by: PEDIATRICS

## 2024-01-04 PROCEDURE — 250N000013 HC RX MED GY IP 250 OP 250 PS 637: Performed by: PEDIATRICS

## 2024-01-04 PROCEDURE — 64644 CHEMODENERV 1 EXTREM 5/> MUS: CPT | Performed by: STUDENT IN AN ORGANIZED HEALTH CARE EDUCATION/TRAINING PROGRAM

## 2024-01-04 PROCEDURE — 95874 GUIDE NERV DESTR NEEDLE EMG: CPT | Performed by: STUDENT IN AN ORGANIZED HEALTH CARE EDUCATION/TRAINING PROGRAM

## 2024-01-04 PROCEDURE — 250N000020 HC RX IP 250 OP 636 J0585: Mod: JZ | Performed by: STUDENT IN AN ORGANIZED HEALTH CARE EDUCATION/TRAINING PROGRAM

## 2024-01-04 PROCEDURE — 258N000003 HC RX IP 258 OP 636: Performed by: PEDIATRICS

## 2024-01-04 RX ORDER — HEPARIN SODIUM (PORCINE) LOCK FLUSH IV SOLN 100 UNIT/ML 100 UNIT/ML
5 SOLUTION INTRAVENOUS
Status: CANCELLED | OUTPATIENT
Start: 2024-04-01

## 2024-01-04 RX ORDER — OXYCODONE HYDROCHLORIDE 10 MG/1
10 TABLET ORAL EVERY 4 HOURS PRN
Qty: 20 TABLET | Refills: 0 | Status: SHIPPED | OUTPATIENT
Start: 2024-01-05 | End: 2024-01-12

## 2024-01-04 RX ORDER — KETOROLAC TROMETHAMINE 30 MG/ML
30 INJECTION, SOLUTION INTRAMUSCULAR; INTRAVENOUS ONCE
Status: CANCELLED
Start: 2024-04-01 | End: 2024-04-01

## 2024-01-04 RX ORDER — KETOROLAC TROMETHAMINE 30 MG/ML
30 INJECTION, SOLUTION INTRAMUSCULAR; INTRAVENOUS ONCE
Status: COMPLETED | OUTPATIENT
Start: 2024-01-04 | End: 2024-01-04

## 2024-01-04 RX ORDER — HEPARIN SODIUM,PORCINE 10 UNIT/ML
5 VIAL (ML) INTRAVENOUS
Status: CANCELLED | OUTPATIENT
Start: 2024-04-01

## 2024-01-04 RX ORDER — DIPHENHYDRAMINE HCL 25 MG
25 CAPSULE ORAL
Status: CANCELLED
Start: 2024-04-01

## 2024-01-04 RX ORDER — ONDANSETRON 8 MG/1
8 TABLET, ORALLY DISINTEGRATING ORAL
Status: COMPLETED | OUTPATIENT
Start: 2024-01-04 | End: 2024-01-04

## 2024-01-04 RX ORDER — ONDANSETRON 4 MG/1
8 TABLET, FILM COATED ORAL
Status: CANCELLED
Start: 2024-04-01

## 2024-01-04 RX ORDER — DIPHENHYDRAMINE HCL 25 MG
25 CAPSULE ORAL
Status: COMPLETED | OUTPATIENT
Start: 2024-01-04 | End: 2024-01-04

## 2024-01-04 RX ADMIN — HYDROMORPHONE HYDROCHLORIDE 1 MG: 1 INJECTION, SOLUTION INTRAMUSCULAR; INTRAVENOUS; SUBCUTANEOUS at 11:31

## 2024-01-04 RX ADMIN — ONABOTULINUMTOXINA 300 UNITS: 100 INJECTION, POWDER, LYOPHILIZED, FOR SOLUTION INTRADERMAL; INTRAMUSCULAR at 10:27

## 2024-01-04 RX ADMIN — HYDROMORPHONE HYDROCHLORIDE 1 MG: 1 INJECTION, SOLUTION INTRAMUSCULAR; INTRAVENOUS; SUBCUTANEOUS at 12:36

## 2024-01-04 RX ADMIN — HYDROMORPHONE HYDROCHLORIDE 1 MG: 1 INJECTION, SOLUTION INTRAMUSCULAR; INTRAVENOUS; SUBCUTANEOUS at 13:42

## 2024-01-04 RX ADMIN — ONDANSETRON 8 MG: 8 TABLET, ORALLY DISINTEGRATING ORAL at 11:31

## 2024-01-04 RX ADMIN — DIPHENHYDRAMINE HYDROCHLORIDE 25 MG: 25 CAPSULE ORAL at 11:31

## 2024-01-04 RX ADMIN — SODIUM CHLORIDE, POTASSIUM CHLORIDE, SODIUM LACTATE AND CALCIUM CHLORIDE 1000 ML: 600; 310; 30; 20 INJECTION, SOLUTION INTRAVENOUS at 11:31

## 2024-01-04 RX ADMIN — KETOROLAC TROMETHAMINE 30 MG: 30 INJECTION, SOLUTION INTRAMUSCULAR; INTRAVENOUS at 11:31

## 2024-01-04 ASSESSMENT — PAIN SCALES - GENERAL
PAINLEVEL: WORST PAIN (10)
PAINLEVEL: EXTREME PAIN (9)

## 2024-01-04 NOTE — PROGRESS NOTES
PM&R Botox Procedure Note    Chief Complaint: Spastic Hemiparesis    /86   Pulse 90   Temp 97.4  F (36.3  C)   Resp 20   Wt 67.9 kg (149 lb 12.8 oz)   SpO2 94%   BMI 25.71 kg/m         Current Outpatient Medications:     acetaminophen (TYLENOL) 325 MG tablet, Take 2 tablets (650 mg) by mouth every 6 hours as needed for mild pain, Disp: 120 tablet, Rfl: 3    albuterol (PROAIR HFA/PROVENTIL HFA/VENTOLIN HFA) 108 (90 Base) MCG/ACT inhaler, Inhale 2 puffs into the lungs every 6 hours as needed for shortness of breath or wheezing, Disp: 8.5 g, Rfl: 3    albuterol (PROVENTIL) (2.5 MG/3ML) 0.083% neb solution, Take 2 vials (5 mg) by nebulization every 6 hours as needed for shortness of breath or wheezing, Disp: 90 mL, Rfl: 3    aspirin (ASA) 81 MG chewable tablet, Take 1 tablet (81 mg) by mouth 2 times daily, Disp: 60 tablet, Rfl: 11    budesonide-formoterol (SYMBICORT) 160-4.5 MCG/ACT Inhaler, Inhale 2 puffs into the lungs 2 times daily, Disp: 10.2 g, Rfl: 3    cetirizine (ZYRTEC) 10 MG tablet, Take 1 tablet (10 mg) by mouth daily, Disp: 30 tablet, Rfl: 1    deferasirox (JADENU) 360 MG tablet, Take 4 tablets (1,440 mg) by mouth every evening, Disp: 120 tablet, Rfl: 4    diphenhydrAMINE (BENADRYL) 25 MG capsule, Take 1 capsule (25 mg) by mouth every 6 hours as needed for itching or allergies, Disp: 30 capsule, Rfl: 0    EPINEPHrine (ANY BX GENERIC EQUIV) 0.3 MG/0.3ML injection 2-pack, Inject 0.3 mLs (0.3 mg) into the muscle as needed for anaphylaxis, Disp: 1 each, Rfl: 1    Hydroxyurea 1000 MG TABS, Take 3,000 mg by mouth daily, Disp: 90 tablet, Rfl: 3    methocarbamol (ROBAXIN) 750 MG tablet, Take 1 tablet (750 mg) by mouth 4 times daily as needed for muscle spasms (during sickle pain crises. Okay to take scheduled while in pain), Disp: 60 tablet, Rfl: 1    naloxone (NARCAN) 4 MG/0.1ML nasal spray, Spray 4 mg into one nostril alternating nostrils as needed for opioid reversal every 2-3 minutes until assistance  arrives (Patient not taking: Reported on 12/6/2023), Disp: , Rfl:     ondansetron (ZOFRAN) 8 MG tablet, Take 1 tablet (8 mg) by mouth every 8 hours as needed, Disp: 30 tablet, Rfl: 1    oxyCODONE IR (ROXICODONE) 10 MG tablet, Take 1 tablet (10 mg) by mouth every 4 hours as needed for severe pain or breakthrough pain Goal 4 per day. Max 6 per day., Disp: 20 tablet, Rfl: 0    Current Facility-Administered Medications:     botulinum toxin type A (BOTOX) 100 units injection 300 Units, 300 Units, Intramuscular, Once, Katherine Pierce MD        Allergies   Allergen Reactions    Contrast Dye      Hives and breathing issues    Fish-Derived Products Hives    Seafood Hives    Adhesive Tape Hives     Primipore dressing causes hives    Gadolinium     Iodinated Contrast Media         PHYSICAL EXAM      Motor: 4+/5 with right shoulder abduction, 4/5 with right elbow flexion, unable to assess wrist flexion due to contracture. 4/5 with  strength on right. 5/5 left shoulder abduction, elbow extension, wrist extension and  strength/finger intrinsics. 4/5 with right hip flexion, 4/5 with right knee extension, 3/5 with right ankle dorsiflexion, 4+/5 with right EHL, plantar flexion. 5/5 with all muscle groups in left lower extremity.  ROM is 120 degrees with right shoulder abduction, about 130 degrees with right elbow extension.   Tone with MAS- 2/4 with right shoulder abduction, 3/4 with right finger flexors/intrinsics, 2/4 with right knee flexion.Significant right wrist contracture with minimal extension.   Sensory: intact to light touch throughout all dermatomes in bilateral lower extremities.      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      Last seen 10/12/23 for last set of injections- injected total of 250 U. Since then, she has had significant improvement in tone improving mobility and ADLs over the last several weeks. Feels a  "little tighter in her right elbow, forearm pronation and finger flexion, started to notice the tightness coming back since the end of November/beginning of December      RESPONSE TO PREVIOUS TREATMENT:  Significant improvement in tone that lasted \"several\" weeks    BOTULINUM NEUROTOXIN INJECTION PROCEDURES:    VERIFICATION OF PATIENT IDENTIFICATION  Responsible Person:  RUIZ  : verified  Full Name: verified    INDICATIONS FOR PROCEDURES:  Jennifer Cervantes is a 21 year old patient with spasticity affecting the right upper extremity and right lower extremity secondary to a diagnosis of sickle cell disease and previous CVA with resulting spastic hemiparesis with associated pain, loss of joint motion, loss of volitional motor control, hygiene difficulty, difficulty with activities of daily living and difficulty with ambulation and transfers.    Her baseline symptoms have been recalcitrant to oral medications and conservative therapy.  She is here today for reinjection with Botox.    GOAL OF PROCEDURE:  The goal of this procedure is to improve increase active range of motion, improve volitional motor control, decrease pain  and enhance functional independence associated with spasticity.    TOTAL DOSE ADMINISTERED:  Increased dose today based on increased tone with MAS with right elbow flexion, finger flexion.    Dose Administered:  300 units Botox (Botulinum Toxin Type A)       1:1 Dilution     Diluent Used:  Preservative Free Normal Saline  Total Volume of Diluent Used:  3.0 ml  Lot no: [id number]  Exp: 2026    CONSENT:  The risks, benefits, and treatment options were discussed with Jennifer Cervantes and she agreed to proceed.    EQUIPMENT USED:  Needle-27mm stimulating/recording  EMG/NCS Machine    SKIN PREPARATION:  Skin preparation was performed using an alcohol wipe.    GUIDANCE DESCRIPTION:  Electro-myographic guidance was necessary throughout the procedure to accurately identify all areas of spastic muscles while " avoiding injection of non-spastic muscles and underlying muscles , neighboring nerves and nearby vascular structures.     AREA/MUSCLE INJECTED:  Right biceps- 50 U at 2 sites  Right BR- 50 U  Right pronator teres- 50 U  Right FDS- 50 U  Right FCR- 40 U  Right biceps femoris- 60 U at 2 sites    RESPONSE TO PROCEDURE:  Jennifer Cervantes tolerated the procedure well and there were no immediate complications. She was allowed to recover for an appropriate period of time and was discharged home in stable condition.     Katherine Pierce MD  Physical Medicine & Rehabilitation

## 2024-01-04 NOTE — LETTER
1/4/2024         RE: Jennifer Cervantes  [address]  [facility] [address]        Dear Colleague,    Thank you for referring your patient, Jennifer Cervantes, to the [facility]. Please see a copy of my visit note below.    PM&R Botox Procedure Note    Chief Complaint: Spastic Hemiparesis    /86   Pulse 90   Temp 97.4  F (36.3  C)   Resp 20   Wt 67.9 kg (149 lb 12.8 oz)   SpO2 94%   BMI 25.71 kg/m         Current Outpatient Medications:     acetaminophen (TYLENOL) 325 MG tablet, Take 2 tablets (650 mg) by mouth every 6 hours as needed for mild pain, Disp: 120 tablet, Rfl: 3    albuterol (PROAIR HFA/PROVENTIL HFA/VENTOLIN HFA) 108 (90 Base) MCG/ACT inhaler, Inhale 2 puffs into the lungs every 6 hours as needed for shortness of breath or wheezing, Disp: 8.5 g, Rfl: 3    albuterol (PROVENTIL) (2.5 MG/3ML) 0.083% neb solution, Take 2 vials (5 mg) by nebulization every 6 hours as needed for shortness of breath or wheezing, Disp: 90 mL, Rfl: 3    aspirin (ASA) 81 MG chewable tablet, Take 1 tablet (81 mg) by mouth 2 times daily, Disp: 60 tablet, Rfl: 11    budesonide-formoterol (SYMBICORT) 160-4.5 MCG/ACT Inhaler, Inhale 2 puffs into the lungs 2 times daily, Disp: 10.2 g, Rfl: 3    cetirizine (ZYRTEC) 10 MG tablet, Take 1 tablet (10 mg) by mouth daily, Disp: 30 tablet, Rfl: 1    deferasirox (JADENU) 360 MG tablet, Take 4 tablets (1,440 mg) by mouth every evening, Disp: 120 tablet, Rfl: 4    diphenhydrAMINE (BENADRYL) 25 MG capsule, Take 1 capsule (25 mg) by mouth every 6 hours as needed for itching or allergies, Disp: 30 capsule, Rfl: 0    EPINEPHrine (ANY BX GENERIC EQUIV) 0.3 MG/0.3ML injection 2-pack, Inject 0.3 mLs (0.3 mg) into the muscle as needed for anaphylaxis, Disp: 1 each, Rfl: 1    Hydroxyurea 1000 MG TABS, Take 3,000 mg by mouth daily, Disp: 90 tablet, Rfl: 3    methocarbamol (ROBAXIN) 750 MG tablet, Take 1 tablet (750 mg) by mouth 4 times daily as needed for muscle  spasms (during sickle pain crises. Okay to take scheduled while in pain), Disp: 60 tablet, Rfl: 1    naloxone (NARCAN) 4 MG/0.1ML nasal spray, Spray 4 mg into one nostril alternating nostrils as needed for opioid reversal every 2-3 minutes until assistance arrives (Patient not taking: Reported on 12/6/2023), Disp: , Rfl:     ondansetron (ZOFRAN) 8 MG tablet, Take 1 tablet (8 mg) by mouth every 8 hours as needed, Disp: 30 tablet, Rfl: 1    oxyCODONE IR (ROXICODONE) 10 MG tablet, Take 1 tablet (10 mg) by mouth every 4 hours as needed for severe pain or breakthrough pain Goal 4 per day. Max 6 per day., Disp: 20 tablet, Rfl: 0    Current Facility-Administered Medications:     botulinum toxin type A (BOTOX) 100 units injection 300 Units, 300 Units, Intramuscular, Once, Katherine Pierce MD        Allergies   Allergen Reactions    Contrast Dye      Hives and breathing issues    Fish-Derived Products Hives    Seafood Hives    Adhesive Tape Hives     Primipore dressing causes hives    Gadolinium     Iodinated Contrast Media         PHYSICAL EXAM      Motor: 4+/5 with right shoulder abduction, 4/5 with right elbow flexion, unable to assess wrist flexion due to contracture. 4/5 with  strength on right. 5/5 left shoulder abduction, elbow extension, wrist extension and  strength/finger intrinsics. 4/5 with right hip flexion, 4/5 with right knee extension, 3/5 with right ankle dorsiflexion, 4+/5 with right EHL, plantar flexion. 5/5 with all muscle groups in left lower extremity.  ROM is 120 degrees with right shoulder abduction, about 130 degrees with right elbow extension.   Tone with MAS- 2/4 with right shoulder abduction, 3/4 with right finger flexors/intrinsics, 2/4 with right knee flexion.Significant right wrist contracture with minimal extension.   Sensory: intact to light touch throughout all dermatomes in bilateral lower extremities.      HPI  24 yo HgbSS pain crises, hx of childhood CVA w/ residual R arm  "weakness and significant tone as a result of her past CVAs.      Last seen 10/12/23 for last set of injections- injected total of 250 U. Since then, she has had significant improvement in tone improving mobility and ADLs over the last several weeks. Feels a little tighter in her right elbow, forearm pronation and finger flexion, started to notice the tightness coming back since the end of November/beginning of December      RESPONSE TO PREVIOUS TREATMENT:  Significant improvement in tone that lasted \"several\" weeks    BOTULINUM NEUROTOXIN INJECTION PROCEDURES:    VERIFICATION OF PATIENT IDENTIFICATION  Responsible Person:  RUIZ  : verified  Full Name: verified    INDICATIONS FOR PROCEDURES:  Jennifer Cervantes is a 21 year old patient with spasticity affecting the right upper extremity and right lower extremity secondary to a diagnosis of sickle cell disease and previous CVA with resulting spastic hemiparesis with associated pain, loss of joint motion, loss of volitional motor control, hygiene difficulty, difficulty with activities of daily living and difficulty with ambulation and transfers.    Her baseline symptoms have been recalcitrant to oral medications and conservative therapy.  She is here today for reinjection with Botox.    GOAL OF PROCEDURE:  The goal of this procedure is to improve increase active range of motion, improve volitional motor control, decrease pain  and enhance functional independence associated with spasticity.    TOTAL DOSE ADMINISTERED:  Increased dose today based on increased tone with MAS with right elbow flexion, finger flexion.    Dose Administered:  300 units Botox (Botulinum Toxin Type A)       1:1 Dilution     Diluent Used:  Preservative Free Normal Saline  Total Volume of Diluent Used:  3.0 ml  Lot no: [id number]  Exp: 2026    CONSENT:  The risks, benefits, and treatment options were discussed with Jennifer Cervantes and she agreed to proceed.    EQUIPMENT USED:  Needle-27mm " stimulating/recording  EMG/NCS Machine    SKIN PREPARATION:  Skin preparation was performed using an alcohol wipe.    GUIDANCE DESCRIPTION:  Electro-myographic guidance was necessary throughout the procedure to accurately identify all areas of spastic muscles while avoiding injection of non-spastic muscles and underlying muscles , neighboring nerves and nearby vascular structures.     AREA/MUSCLE INJECTED:  Right biceps- 50 U at 2 sites  Right BR- 50 U  Right pronator teres- 50 U  Right FDS- 50 U  Right FCR- 40 U  Right biceps femoris- 60 U at 2 sites    RESPONSE TO PROCEDURE:  Jennifer Cervantes tolerated the procedure well and there were no immediate complications. She was allowed to recover for an appropriate period of time and was discharged home in stable condition.     Katherine Pierce MD  Physical Medicine & Rehabilitation

## 2024-01-04 NOTE — TELEPHONE ENCOUNTER
[facility]: Physical Medicine and Rehabilitation                                                                                   Assisted with Botox injections today.      Jennifer shared holiday joys and work concerns.  We scheduled next Neurotoxin appointment for in March.    Plan of Care Education Review:   Assessment completed with:: Patient    Plan of Care Education   Does patient understand diagnosis?: Yes  Tx plan/regimen:: Botox every 12 weeks  Does patient understand treatment plan/regimen?: Yes  Supportive services education provided for:: Cancer rehab  Safety/self care at home reviewed with patient:: Yes  Coping - concerns/fears reviewed with patient:: Yes  Plan of Care:: MD follow-up appointment    Evaluation of Learning  Patient Education Provided: Yes  Readiness:: Acceptance  Method:: Explanation  Response:: Verbalizes understanding           Mandie Graham LPN  Oncology PM&R Care Coordination  [phone]

## 2024-01-04 NOTE — NURSING NOTE
"Oncology Rooming Note    January 4, 2024 9:52 AM   Jennifer Cervantes is a 23 year old female who presents for:    Chief Complaint   Patient presents with    Oncology Clinic Visit     Sickle cell pain crisis     Initial Vitals: /86   Pulse 90   Temp 97.4  F (36.3  C)   Resp 20   Wt 67.9 kg (149 lb 12.8 oz)   SpO2 94%   BMI 25.71 kg/m   Estimated body mass index is 25.71 kg/m  as calculated from the following:    Height as of 12/22/23: 1.626 m (5' 4\").    Weight as of this encounter: 67.9 kg (149 lb 12.8 oz). Body surface area is 1.75 meters squared.  Worst Pain (10) Comment: Data Unavailable   No LMP recorded. Patient has had an implant.  Allergies reviewed: No  Medications reviewed: No    Medications: Medication refills not needed today.  Pharmacy name entered into EPIC: [facility] [place], [place] - [address] [place] [address] [phone]    Frailty Screening:   Is the patient here for a new oncology consult visit in cancer care? 2. No      Clinical concerns: Pt declined to review allergies and medications today.      Jesus Yonug"

## 2024-01-04 NOTE — PROGRESS NOTES
Infusion Nursing Note:  Jennifer Cervantes presents today for add-on infusion.    Patient seen by provider today: No   present during visit today: Not Applicable.    Note: Patient received dilaudid x3, 1 L LR bolus, toradol, zofran, and benadryl for 9/10 arm pain with some relief. Patient stable to discharge.      Intravenous Access:  Implanted Port.    Treatment Conditions:  Results reviewed, labs MET treatment parameters, ok to proceed with treatment.      Post Infusion Assessment:  Patient tolerated infusion without incident.       Discharge Plan:   Patient and/or family verbalized understanding of discharge instructions and all questions answered.      Vicenta Boone RN

## 2024-01-04 NOTE — TELEPHONE ENCOUNTER
Oncology Nurse Triage - Sickle Cell Pain Crisis:  Situation: Jennifer  calling about Sickle Cell Pain Crisis, requesting to be added on for IV fluids and pain medicine    Background:   Patient's last infusion was 1/2/24  Last clinic visit date:12/28/23  Does patient have active treatment plan?  Yes    Assessment of Symptoms:  Onset/Duration of symptoms: 2 days    Is it typical sickle cell pain? Yes   Location: L arm  Character: Sharp and Stabbing           Intensity: 10/10    Any radiation of pain, numbness, tingling, weakness, warmth, swelling, discoloration of arms or legs?Yes pain only    Fever?No    Chest Pain Present: No     Shortness of breath: No     Other home therapies tried: HEAT/HEATING PAD     Last home medication taken and when: 0530 IBU, methocarbamol, oxycodone    Any Refills Needed?: No     Does patient have transportation & length of time to get to clinic: Yes -has appt at 10am for 1 hour and will be at . If infusion is after this appt, she would need transportation.     Recommendations:   Added to infusion wait list.   0826 call to pt to offer 1130 appt in BMT infusion. Pt accepting of time.   0828 message sent to scheduling.     If you do not hear from the infusion center by 2pm then you will not be able to get in for an infusion today. If symptoms worsen while waiting for call back, and/or you experience fever, chills, SOB, chest pain, cough, n/v, dizziness, numbness, swelling, discoloration of extremities, then seek emergency evaluation in Emergency Department.     Please note, if you are late for your appt, you risk losing your infusion appt as it may delay another patient's infusion who arrived on time.

## 2024-01-06 ENCOUNTER — HOSPITAL ENCOUNTER (EMERGENCY)
Facility: CLINIC | Age: 24
Discharge: HOME | End: 2024-01-06
Attending: EMERGENCY MEDICINE | Admitting: EMERGENCY MEDICINE
Payer: COMMERCIAL

## 2024-01-06 VITALS
RESPIRATION RATE: 16 BRPM | HEART RATE: 85 BPM | WEIGHT: 149 LBS | SYSTOLIC BLOOD PRESSURE: 129 MMHG | BODY MASS INDEX: 25.44 KG/M2 | TEMPERATURE: 98.5 F | HEIGHT: 64 IN | DIASTOLIC BLOOD PRESSURE: 45 MMHG | OXYGEN SATURATION: 95 %

## 2024-01-06 DIAGNOSIS — D57.00 SICKLE CELL PAIN CRISIS (H): ICD-10-CM

## 2024-01-06 LAB
ANION GAP SERPL CALCULATED.3IONS-SCNC: 10 MMOL/L (ref 7–15)
BASOPHILS # BLD AUTO: 0.2 10E3/UL (ref 0–0.2)
BASOPHILS NFR BLD AUTO: 2 %
BUN SERPL-MCNC: 9.5 MG/DL (ref 6–20)
CALCIUM SERPL-MCNC: 8.9 MG/DL (ref 8.6–10)
CHLORIDE SERPL-SCNC: 103 MMOL/L (ref 98–107)
CREAT SERPL-MCNC: 0.5 MG/DL (ref 0.51–0.95)
DEPRECATED HCO3 PLAS-SCNC: 25 MMOL/L (ref 22–29)
EGFRCR SERPLBLD CKD-EPI 2021: >90 ML/MIN/1.73M2
EOSINOPHIL # BLD AUTO: 0.3 10E3/UL (ref 0–0.7)
EOSINOPHIL NFR BLD AUTO: 3 %
ERYTHROCYTE [DISTWIDTH] IN BLOOD BY AUTOMATED COUNT: 23.6 % (ref 10–15)
GLUCOSE SERPL-MCNC: 83 MG/DL (ref 70–99)
HCT VFR BLD AUTO: 20.3 % (ref 35–47)
HGB BLD-MCNC: 7.1 G/DL (ref 11.7–15.7)
IMM GRANULOCYTES # BLD: 0.1 10E3/UL
IMM GRANULOCYTES NFR BLD: 0 %
LYMPHOCYTES # BLD AUTO: 2.3 10E3/UL (ref 0.8–5.3)
LYMPHOCYTES NFR BLD AUTO: 20 %
MCH RBC QN AUTO: 30 PG (ref 26.5–33)
MCHC RBC AUTO-ENTMCNC: 35 G/DL (ref 31.5–36.5)
MCV RBC AUTO: 86 FL (ref 78–100)
MONOCYTES # BLD AUTO: 1 10E3/UL (ref 0–1.3)
MONOCYTES NFR BLD AUTO: 9 %
NEUTROPHILS # BLD AUTO: 7.6 10E3/UL (ref 1.6–8.3)
NEUTROPHILS NFR BLD AUTO: 66 %
NRBC # BLD AUTO: 0.2 10E3/UL
NRBC BLD AUTO-RTO: 2 /100
PLATELET # BLD AUTO: 430 10E3/UL (ref 150–450)
POTASSIUM SERPL-SCNC: 3.8 MMOL/L (ref 3.4–5.3)
RBC # BLD AUTO: 2.37 10E6/UL (ref 3.8–5.2)
RETICS # AUTO: 0.43 10E6/UL (ref 0.03–0.1)
RETICS/RBC NFR AUTO: 18.2 % (ref 0.5–2)
SODIUM SERPL-SCNC: 138 MMOL/L (ref 135–145)
WBC # BLD AUTO: 11.4 10E3/UL (ref 4–11)

## 2024-01-06 PROCEDURE — 85025 COMPLETE CBC W/AUTO DIFF WBC: CPT | Performed by: EMERGENCY MEDICINE

## 2024-01-06 PROCEDURE — 96361 HYDRATE IV INFUSION ADD-ON: CPT | Performed by: EMERGENCY MEDICINE

## 2024-01-06 PROCEDURE — 36415 COLL VENOUS BLD VENIPUNCTURE: CPT | Performed by: EMERGENCY MEDICINE

## 2024-01-06 PROCEDURE — 80048 BASIC METABOLIC PNL TOTAL CA: CPT | Performed by: EMERGENCY MEDICINE

## 2024-01-06 PROCEDURE — 99284 EMERGENCY DEPT VISIT MOD MDM: CPT | Mod: 25 | Performed by: EMERGENCY MEDICINE

## 2024-01-06 PROCEDURE — 99284 EMERGENCY DEPT VISIT MOD MDM: CPT | Performed by: EMERGENCY MEDICINE

## 2024-01-06 PROCEDURE — 258N000003 HC RX IP 258 OP 636: Performed by: EMERGENCY MEDICINE

## 2024-01-06 PROCEDURE — 250N000011 HC RX IP 250 OP 636: Performed by: EMERGENCY MEDICINE

## 2024-01-06 PROCEDURE — 96374 THER/PROPH/DIAG INJ IV PUSH: CPT | Performed by: EMERGENCY MEDICINE

## 2024-01-06 PROCEDURE — 250N000013 HC RX MED GY IP 250 OP 250 PS 637: Performed by: EMERGENCY MEDICINE

## 2024-01-06 PROCEDURE — 85045 AUTOMATED RETICULOCYTE COUNT: CPT | Performed by: EMERGENCY MEDICINE

## 2024-01-06 PROCEDURE — 96376 TX/PRO/DX INJ SAME DRUG ADON: CPT | Performed by: EMERGENCY MEDICINE

## 2024-01-06 PROCEDURE — 96375 TX/PRO/DX INJ NEW DRUG ADDON: CPT | Performed by: EMERGENCY MEDICINE

## 2024-01-06 RX ORDER — KETOROLAC TROMETHAMINE 30 MG/ML
30 INJECTION, SOLUTION INTRAMUSCULAR; INTRAVENOUS ONCE
Status: COMPLETED | OUTPATIENT
Start: 2024-01-06 | End: 2024-01-06

## 2024-01-06 RX ORDER — ONDANSETRON 2 MG/ML
8 INJECTION INTRAMUSCULAR; INTRAVENOUS EVERY 6 HOURS PRN
Status: DISCONTINUED | OUTPATIENT
Start: 2024-01-06 | End: 2024-01-06 | Stop reason: HOSPADM

## 2024-01-06 RX ORDER — HEPARIN SODIUM (PORCINE) LOCK FLUSH IV SOLN 100 UNIT/ML 100 UNIT/ML
100 SOLUTION INTRAVENOUS ONCE
Status: COMPLETED | OUTPATIENT
Start: 2024-01-06 | End: 2024-01-06

## 2024-01-06 RX ORDER — DIPHENHYDRAMINE HCL 25 MG
25 CAPSULE ORAL ONCE
Status: COMPLETED | OUTPATIENT
Start: 2024-01-06 | End: 2024-01-06

## 2024-01-06 RX ADMIN — HYDROMORPHONE HYDROCHLORIDE 1 MG: 1 INJECTION, SOLUTION INTRAMUSCULAR; INTRAVENOUS; SUBCUTANEOUS at 04:00

## 2024-01-06 RX ADMIN — HYDROMORPHONE HYDROCHLORIDE 1 MG: 1 INJECTION, SOLUTION INTRAMUSCULAR; INTRAVENOUS; SUBCUTANEOUS at 04:58

## 2024-01-06 RX ADMIN — SODIUM CHLORIDE, POTASSIUM CHLORIDE, SODIUM LACTATE AND CALCIUM CHLORIDE 1000 ML: 600; 310; 30; 20 INJECTION, SOLUTION INTRAVENOUS at 04:07

## 2024-01-06 RX ADMIN — DIPHENHYDRAMINE HYDROCHLORIDE 25 MG: 25 CAPSULE ORAL at 06:07

## 2024-01-06 RX ADMIN — HYDROMORPHONE HYDROCHLORIDE 1 MG: 1 INJECTION, SOLUTION INTRAMUSCULAR; INTRAVENOUS; SUBCUTANEOUS at 06:10

## 2024-01-06 RX ADMIN — ONDANSETRON 8 MG: 2 INJECTION INTRAMUSCULAR; INTRAVENOUS at 03:59

## 2024-01-06 RX ADMIN — KETOROLAC TROMETHAMINE 30 MG: 30 INJECTION, SOLUTION INTRAMUSCULAR; INTRAVENOUS at 03:59

## 2024-01-06 RX ADMIN — HEPARIN 100 UNITS: 100 SYRINGE at 07:18

## 2024-01-06 ASSESSMENT — ACTIVITIES OF DAILY LIVING (ADL)
ADLS_ACUITY_SCORE: 35
ADLS_ACUITY_SCORE: 33
ADLS_ACUITY_SCORE: 35

## 2024-01-06 NOTE — ED TRIAGE NOTES
Triage Assessment (Adult)       Row Name 01/06/24 0300          Triage Assessment    Airway WDL WDL        Respiratory WDL    Respiratory WDL WDL        Skin Circulation/Temperature WDL    Skin Circulation/Temperature WDL WDL

## 2024-01-06 NOTE — ED PROVIDER NOTES
ED Provider Note  [facility]      History     Chief Complaint   Patient presents with    Sickle Cell Pain Crisis     Sickle crises.       HPI  Jennifer Cervantes is a 23 year old female who has medical history of sickle disease presenting with sickle cell pain crisis.  This been present since the evening.  Denies falls or injuries.  It is in her arms and legs.  There is no change from prior episodes of sickle cell pain.  She is not having chest pain, fevers, shortness of breath, cough.  Denies any swelling of her extremities.  No headaches.  She tried home medications without relief no abdominal pain, vomiting or diarrhea.    Past Medical History  Past Medical History:   Diagnosis Date    Anxiety     Bleeding disorder (H24)     Blood clotting disorder (H24)     Cerebral infarction (H) 2015    Cognitive developmental delay     low IQ. 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 side (H)     right hand contractures    Iron overload due to repeated red blood cell transfusions     Migraines     Multiple subsegmental pulmonary emboli without acute cor pulmonale (H) 02/01/2021    Oppositional defiant behavior     Presence of intrauterine contraceptive device 2/18/2020    Superficial venous thrombosis of arm, right 03/25/2021    Uncomplicated asthma      Past Surgical History:   Procedure Laterality Date    AS INSERT TUNNELED CV 2 CATH W/O PORT/PUMP      CHOLECYSTECTOMY      CV RIGHT HEART CATH MEASUREMENTS RECORDED N/A 11/18/2021    Procedure: Right Heart Cath;  Surgeon: Jackson Stauffer MD;  Location:  HEART CARDIAC CATH LAB    INSERT PORT VASCULAR ACCESS Left 4/21/2021    Procedure: INSERTION, VASCULAR ACCESS PORT (NOT SURE ON SIDE UNTIL REMOVAL);  Surgeon: Rajan More MD;  Location: [facility] OR    IR CHEST PORT PLACEMENT > 5 YRS OF AGE  4/21/2021    IR CVC NON TUNNEL LINE  REMOVAL  5/6/2021    IR CVC NON TUNNEL PLACEMENT > 5 YRS  04/07/2020    IR CVC NON TUNNEL PLACEMENT > 5 YRS  4/30/2021    IR CVC NON TUNNEL PLACEMENT > 5 YRS  9/7/2022    IR PORT REMOVAL LEFT  4/21/2021    REMOVE PORT VASCULAR ACCESS Left 4/21/2021    Procedure: REMOVAL, VASCULAR ACCESS PORT LEFT;  Surgeon: Rajan More MD;  Location: [facility] OR    REPAIR TENDON ELBOW Right 10/02/2019    Procedure: Right Elbow Flexor Lengthening, Flexor Pronator Slide Of Wrist and Finger, Thumb Adductor Lengthening;  Surgeon: Anai Franco MD;  Location: [facility] OR    TONSILLECTOMY Bilateral 10/02/2019    Procedure: Bilateral Tonsillectomy;  Surgeon: Farhana Guy MD;  Location: [facility] OR    ZZC BREAST REDUCTION (INCLUDES LIPO) TIER 3 Bilateral 04/23/2019     acetaminophen (TYLENOL) 325 MG tablet  albuterol (PROAIR HFA/PROVENTIL HFA/VENTOLIN HFA) 108 (90 Base) MCG/ACT inhaler  albuterol (PROVENTIL) (2.5 MG/3ML) 0.083% neb solution  aspirin (ASA) 81 MG chewable tablet  budesonide-formoterol (SYMBICORT) 160-4.5 MCG/ACT Inhaler  cetirizine (ZYRTEC) 10 MG tablet  deferasirox (JADENU) 360 MG tablet  diphenhydrAMINE (BENADRYL) 25 MG capsule  EPINEPHrine (ANY BX GENERIC EQUIV) 0.3 MG/0.3ML injection 2-pack  Hydroxyurea 1000 MG TABS  methocarbamol (ROBAXIN) 750 MG tablet  naloxone (NARCAN) 4 MG/0.1ML nasal spray  ondansetron (ZOFRAN) 8 MG tablet  oxyCODONE IR (ROXICODONE) 10 MG tablet      Allergies   Allergen Reactions    Contrast Dye      Hives and breathing issues    Fish-Derived Products Hives    Seafood Hives    Adhesive Tape Hives     Primipore dressing causes hives    Gadolinium     Iodinated Contrast Media      Family History  Family History   Problem Relation Age of Onset    Sickle Cell Trait Mother     Hypertension Mother     Asthma Mother     Sickle Cell Trait Father     Glaucoma No family hx of     Macular Degeneration No family hx of     Diabetes No family hx of     Gout No family hx of      Social History   Social  "History     Tobacco Use    Smoking status: Never     Passive exposure: Never    Smokeless tobacco: Never   Substance Use Topics    Alcohol use: Not Currently     Alcohol/week: 0.0 standard drinks of alcohol    Drug use: Never         A medically appropriate review of systems was performed with pertinent positives and negatives noted in the HPI, and all other systems negative.    Physical Exam   BP: 120/82  Pulse: 102  Temp: 98.5  F (36.9  C)  Resp: 16  Height: 162.6 cm (5' 4\")  Weight: 67.6 kg (149 lb)  SpO2: 100 %  Physical Exam  Physical Exam   Constitutional: oriented to person, place, and time. appears well-developed and well-nourished.   HENT:   Head: Normocephalic and atraumatic.   Neck: Normal range of motion.   Pulmonary/Chest: Effort normal. No respiratory distress.   Cardiac: No murmurs, rubs, gallops. RRR.  Abdominal: Abdomen soft, nontender, nondistended. No rebound tenderness.  MSK: Long bones without deformity or evidence of trauma.  No point reproducible tenderness over the upper or lower extremities.  Compartment soft in the upper lower extremities.  Neurological: alert and oriented to person, place, and time.  Stable gait.  Skin: Skin is warm and dry.   Psychiatric:  normal mood and affect.  behavior is normal. Thought content normal.       ED Course, Procedures, & Data      Procedures              Results for orders placed or performed during the hospital encounter of 01/06/24   Basic metabolic panel     Status: Abnormal   Result Value Ref Range    Sodium 138 135 - 145 mmol/L    Potassium 3.8 3.4 - 5.3 mmol/L    Chloride 103 98 - 107 mmol/L    Carbon Dioxide (CO2) 25 22 - 29 mmol/L    Anion Gap 10 7 - 15 mmol/L    Urea Nitrogen 9.5 6.0 - 20.0 mg/dL    Creatinine 0.50 (L) 0.51 - 0.95 mg/dL    GFR Estimate >90 >60 mL/min/1.73m2    Calcium 8.9 8.6 - 10.0 mg/dL    Glucose 83 70 - 99 mg/dL   Reticulocyte count     Status: Abnormal   Result Value Ref Range    % Reticulocyte 18.2 (H) 0.5 - 2.0 %    " Absolute Reticulocyte 0.432 (H) 0.025 - 0.095 10e6/uL   CBC with platelets and differential     Status: Abnormal   Result Value Ref Range    WBC Count 11.4 (H) 4.0 - 11.0 10e3/uL    RBC Count 2.37 (L) 3.80 - 5.20 10e6/uL    Hemoglobin 7.1 (L) 11.7 - 15.7 g/dL    Hematocrit 20.3 (L) 35.0 - 47.0 %    MCV 86 78 - 100 fL    MCH 30.0 26.5 - 33.0 pg    MCHC 35.0 31.5 - 36.5 g/dL    RDW 23.6 (H) 10.0 - 15.0 %    Platelet Count 430 150 - 450 10e3/uL    % Neutrophils 66 %    % Lymphocytes 20 %    % Monocytes 9 %    % Eosinophils 3 %    % Basophils 2 %    % Immature Granulocytes 0 %    NRBCs per 100 WBC 2 (H) <1 /100    Absolute Neutrophils 7.6 1.6 - 8.3 10e3/uL    Absolute Lymphocytes 2.3 0.8 - 5.3 10e3/uL    Absolute Monocytes 1.0 0.0 - 1.3 10e3/uL    Absolute Eosinophils 0.3 0.0 - 0.7 10e3/uL    Absolute Basophils 0.2 0.0 - 0.2 10e3/uL    Absolute Immature Granulocytes 0.1 <=0.4 10e3/uL    Absolute NRBCs 0.2 10e3/uL   CBC with platelets differential     Status: Abnormal    Narrative    The following orders were created for panel order CBC with platelets differential.  Procedure                               Abnormality         Status                     ---------                               -----------         ------                     CBC with platelets and d...[[id number]]  Abnormal            Final result                 Please view results for these tests on the individual orders.     Medications   lactated ringers BOLUS 1,000 mL (has no administration in time range)   HYDROmorphone (DILAUDID) injection 1 mg (has no administration in time range)   ondansetron (ZOFRAN) injection 8 mg (has no administration in time range)   ketorolac (TORADOL) injection 30 mg (has no administration in time range)     Labs Ordered and Resulted from Time of ED Arrival to Time of ED Departure - No data to display  No orders to display          Critical care was not performed.     Medical Decision Making  The patient's presentation was of  moderate complexity (a chronic illness mild to moderate exacerbation, progression, or side effect of treatment).    The patient's evaluation involved:  review of external note(s) from 1 sources (care plan)  ordering and/or review of 3+ test(s) in this encounter (see separate area of note for details)    The patient's management necessitated high risk (a parenteral controlled substance).    Assessment & Plan    MDM  Patient presenting with sickle cell pain crisis.  Pain is typical today without any acute changes from typical symptoms.  No signs of acute chest, pulmonary embolism, hepatobiliary process or neurologic issue.  She was given her pain medications here with good relief.  Labs otherwise show elevated reticulocyte count which is not uncommon for this patient, stable hemoglobin at 7.1 and stable electrolytes.  She feels comfortable discharge.    I have reviewed the nursing notes. I have reviewed the findings, diagnosis, plan and need for follow up with the patient.    New Prescriptions    No medications on file       Final diagnoses:   Sickle cell pain crisis (H)       Andrew Chou  [facility] EMERGENCY DEPARTMENT  1/6/2024     Andrew Chou MD  01/06/24 0548

## 2024-01-08 ENCOUNTER — PATIENT OUTREACH (OUTPATIENT)
Dept: CARE COORDINATION | Facility: CLINIC | Age: 24
End: 2024-01-08
Payer: COMMERCIAL

## 2024-01-08 ENCOUNTER — INFUSION THERAPY VISIT (OUTPATIENT)
Dept: TRANSPLANT | Facility: CLINIC | Age: 24
End: 2024-01-08
Attending: PEDIATRICS
Payer: COMMERCIAL

## 2024-01-08 ENCOUNTER — NURSE TRIAGE (OUTPATIENT)
Dept: ONCOLOGY | Facility: CLINIC | Age: 24
End: 2024-01-08
Payer: COMMERCIAL

## 2024-01-08 DIAGNOSIS — D57.00 SICKLE CELL PAIN CRISIS (H): Primary | ICD-10-CM

## 2024-01-08 DIAGNOSIS — G81.10 SPASTIC HEMIPLEGIA, UNSPECIFIED ETIOLOGY, UNSPECIFIED LATERALITY (H): ICD-10-CM

## 2024-01-08 PROCEDURE — 250N000011 HC RX IP 250 OP 636: Performed by: PEDIATRICS

## 2024-01-08 PROCEDURE — 96375 TX/PRO/DX INJ NEW DRUG ADDON: CPT

## 2024-01-08 PROCEDURE — 96376 TX/PRO/DX INJ SAME DRUG ADON: CPT

## 2024-01-08 PROCEDURE — 258N000003 HC RX IP 258 OP 636: Performed by: PEDIATRICS

## 2024-01-08 PROCEDURE — 96374 THER/PROPH/DIAG INJ IV PUSH: CPT

## 2024-01-08 PROCEDURE — 96361 HYDRATE IV INFUSION ADD-ON: CPT

## 2024-01-08 PROCEDURE — 250N000013 HC RX MED GY IP 250 OP 250 PS 637: Performed by: PEDIATRICS

## 2024-01-08 RX ORDER — ONDANSETRON 4 MG/1
8 TABLET, FILM COATED ORAL
Status: CANCELLED
Start: 2024-04-01

## 2024-01-08 RX ORDER — MEPERIDINE HYDROCHLORIDE 25 MG/ML
25 INJECTION INTRAMUSCULAR; INTRAVENOUS; SUBCUTANEOUS EVERY 30 MIN PRN
Status: CANCELLED | OUTPATIENT
Start: 2024-01-25

## 2024-01-08 RX ORDER — HEPARIN SODIUM,PORCINE 10 UNIT/ML
5 VIAL (ML) INTRAVENOUS
Status: CANCELLED | OUTPATIENT
Start: 2024-01-25

## 2024-01-08 RX ORDER — DIPHENHYDRAMINE HCL 25 MG
25 CAPSULE ORAL
Status: COMPLETED | OUTPATIENT
Start: 2024-01-08 | End: 2024-01-08

## 2024-01-08 RX ORDER — ONDANSETRON 4 MG/1
8 TABLET, ORALLY DISINTEGRATING ORAL
Status: COMPLETED | OUTPATIENT
Start: 2024-01-08 | End: 2024-01-08

## 2024-01-08 RX ORDER — HEPARIN SODIUM (PORCINE) LOCK FLUSH IV SOLN 100 UNIT/ML 100 UNIT/ML
5 SOLUTION INTRAVENOUS
Status: CANCELLED | OUTPATIENT
Start: 2024-01-25

## 2024-01-08 RX ORDER — HEPARIN SODIUM,PORCINE 10 UNIT/ML
5 VIAL (ML) INTRAVENOUS
Status: CANCELLED | OUTPATIENT
Start: 2024-04-01

## 2024-01-08 RX ORDER — DIPHENHYDRAMINE HYDROCHLORIDE 50 MG/ML
50 INJECTION INTRAMUSCULAR; INTRAVENOUS
Status: CANCELLED
Start: 2024-01-25

## 2024-01-08 RX ORDER — ALBUTEROL SULFATE 0.83 MG/ML
2.5 SOLUTION RESPIRATORY (INHALATION)
Status: CANCELLED | OUTPATIENT
Start: 2024-01-25

## 2024-01-08 RX ORDER — HEPARIN SODIUM (PORCINE) LOCK FLUSH IV SOLN 100 UNIT/ML 100 UNIT/ML
5 SOLUTION INTRAVENOUS
Status: CANCELLED | OUTPATIENT
Start: 2024-04-01

## 2024-01-08 RX ORDER — METHYLPREDNISOLONE SODIUM SUCCINATE 125 MG/2ML
125 INJECTION, POWDER, LYOPHILIZED, FOR SOLUTION INTRAMUSCULAR; INTRAVENOUS
Status: CANCELLED
Start: 2024-01-25

## 2024-01-08 RX ORDER — EPINEPHRINE 1 MG/ML
0.3 INJECTION, SOLUTION INTRAMUSCULAR; SUBCUTANEOUS EVERY 5 MIN PRN
Status: CANCELLED | OUTPATIENT
Start: 2024-01-25

## 2024-01-08 RX ORDER — KETOROLAC TROMETHAMINE 30 MG/ML
30 INJECTION, SOLUTION INTRAMUSCULAR; INTRAVENOUS ONCE
Status: CANCELLED
Start: 2024-04-01 | End: 2024-04-01

## 2024-01-08 RX ORDER — DIPHENHYDRAMINE HCL 25 MG
25 CAPSULE ORAL
Status: CANCELLED
Start: 2024-04-01

## 2024-01-08 RX ORDER — KETOROLAC TROMETHAMINE 30 MG/ML
30 INJECTION, SOLUTION INTRAMUSCULAR; INTRAVENOUS ONCE
Status: COMPLETED | OUTPATIENT
Start: 2024-01-08 | End: 2024-01-08

## 2024-01-08 RX ORDER — ALBUTEROL SULFATE 90 UG/1
1-2 AEROSOL, METERED RESPIRATORY (INHALATION)
Status: CANCELLED
Start: 2024-01-25

## 2024-01-08 RX ADMIN — HYDROMORPHONE HYDROCHLORIDE 1 MG: 1 INJECTION, SOLUTION INTRAMUSCULAR; INTRAVENOUS; SUBCUTANEOUS at 12:40

## 2024-01-08 RX ADMIN — ONDANSETRON 8 MG: 4 TABLET, ORALLY DISINTEGRATING ORAL at 12:54

## 2024-01-08 RX ADMIN — KETOROLAC TROMETHAMINE 30 MG: 30 INJECTION, SOLUTION INTRAMUSCULAR; INTRAVENOUS at 12:46

## 2024-01-08 RX ADMIN — HYDROMORPHONE HYDROCHLORIDE 1 MG: 1 INJECTION, SOLUTION INTRAMUSCULAR; INTRAVENOUS; SUBCUTANEOUS at 13:43

## 2024-01-08 RX ADMIN — HYDROMORPHONE HYDROCHLORIDE 1 MG: 1 INJECTION, SOLUTION INTRAMUSCULAR; INTRAVENOUS; SUBCUTANEOUS at 14:44

## 2024-01-08 RX ADMIN — DIPHENHYDRAMINE HYDROCHLORIDE 25 MG: 25 CAPSULE ORAL at 12:45

## 2024-01-08 RX ADMIN — SODIUM CHLORIDE, POTASSIUM CHLORIDE, SODIUM LACTATE AND CALCIUM CHLORIDE 1000 ML: 600; 310; 30; 20 INJECTION, SOLUTION INTRAVENOUS at 12:34

## 2024-01-08 NOTE — PROGRESS NOTES
Social Work - Transportation  [facility]    Data/Intervention:  Patient Name: Jennifer Cervantes Goes By: Jennifer    /Age: 2000 (23 year old)    Referral From: [facility]  Reason for Referral:  support requested for patient transportation needs for today at 12 pm.  Assessment:  called [employer] to arrange ride through patient's insurance. [employer] arranged  for patient from home with [employer]. Patient will need to call [phone] when ready for return ride home.  Plan: Patient is aware of the transportation plan.  available to assist with any other needs.    CARLOS Chavez,LGUDAY  Hematology/Oncology Social Worker  Phone:[phone] Pager: [phone]

## 2024-01-08 NOTE — TELEPHONE ENCOUNTER
BMT can take at noon   Call placed to Jennifer and left message that They could take her at noon for IVF/PM.  Call received from Jennifer that she will take 12:00 appt   Message sent to social work   Message sent to [facility] to schedule

## 2024-01-08 NOTE — PROGRESS NOTES
Infusion Nursing Note:  Jennifer Cervantes presents today for pain medications and IV fluids.    Patient seen by provider today: No   present during visit today: Not Applicable.     Note: Pt c/o 10/10 pain in right leg and left arm and back. Pt would like pain down to about 4/10 with interventions.   Pt received a liter of LR at 500 ml/hr for two hours.     Pt received 30 mg IV toradol, 8 mg po zofran, and 25 mg po benadryl.     Pt received 1 mg dilaudid IV every hour for a total of 3 doses (3 mg dilaudid total).   Pt states pain is down to 5/10 upon discharge.        Intravenous Access:  Implanted Port.     Treatment Conditions:  Not Applicable.        Post Infusion Assessment:  Patient tolerated infusion without incident.  Blood return noted pre and post infusion.  Site patent and intact, free from redness, edema or discomfort.  No evidence of extravasations.  Access discontinued per protocol.         Discharge Plan:   Discharge instructions reviewed with: Patient.  Patient and/or family verbalized understanding of discharge instructions and all questions answered.  Patient discharged in stable condition accompanied by: self.  Departure Mode: Ambulatory.

## 2024-01-10 ENCOUNTER — NURSE TRIAGE (OUTPATIENT)
Dept: ONCOLOGY | Facility: CLINIC | Age: 24
End: 2024-01-10
Payer: COMMERCIAL

## 2024-01-10 ENCOUNTER — INFUSION THERAPY VISIT (OUTPATIENT)
Dept: TRANSPLANT | Facility: CLINIC | Age: 24
End: 2024-01-10
Attending: PEDIATRICS
Payer: COMMERCIAL

## 2024-01-10 ENCOUNTER — PATIENT OUTREACH (OUTPATIENT)
Dept: CARE COORDINATION | Facility: CLINIC | Age: 24
End: 2024-01-10
Payer: COMMERCIAL

## 2024-01-10 VITALS
RESPIRATION RATE: 18 BRPM | SYSTOLIC BLOOD PRESSURE: 122 MMHG | DIASTOLIC BLOOD PRESSURE: 81 MMHG | TEMPERATURE: 98.5 F | OXYGEN SATURATION: 92 % | HEART RATE: 101 BPM

## 2024-01-10 DIAGNOSIS — G81.10 SPASTIC HEMIPLEGIA, UNSPECIFIED ETIOLOGY, UNSPECIFIED LATERALITY (H): ICD-10-CM

## 2024-01-10 DIAGNOSIS — D57.00 SICKLE CELL PAIN CRISIS (H): Primary | ICD-10-CM

## 2024-01-10 PROCEDURE — 250N000011 HC RX IP 250 OP 636: Performed by: PEDIATRICS

## 2024-01-10 PROCEDURE — 96374 THER/PROPH/DIAG INJ IV PUSH: CPT

## 2024-01-10 PROCEDURE — 258N000003 HC RX IP 258 OP 636: Performed by: PEDIATRICS

## 2024-01-10 PROCEDURE — 96361 HYDRATE IV INFUSION ADD-ON: CPT

## 2024-01-10 PROCEDURE — 96375 TX/PRO/DX INJ NEW DRUG ADDON: CPT

## 2024-01-10 PROCEDURE — 250N000013 HC RX MED GY IP 250 OP 250 PS 637: Performed by: PEDIATRICS

## 2024-01-10 PROCEDURE — 96376 TX/PRO/DX INJ SAME DRUG ADON: CPT

## 2024-01-10 RX ORDER — ONDANSETRON 4 MG/1
8 TABLET, ORALLY DISINTEGRATING ORAL
Status: COMPLETED | OUTPATIENT
Start: 2024-01-10 | End: 2024-01-10

## 2024-01-10 RX ORDER — KETOROLAC TROMETHAMINE 30 MG/ML
30 INJECTION, SOLUTION INTRAMUSCULAR; INTRAVENOUS ONCE
Status: CANCELLED
Start: 2024-04-01 | End: 2024-04-01

## 2024-01-10 RX ORDER — ONDANSETRON 4 MG/1
8 TABLET, FILM COATED ORAL
Status: CANCELLED
Start: 2024-04-01

## 2024-01-10 RX ORDER — HEPARIN SODIUM (PORCINE) LOCK FLUSH IV SOLN 100 UNIT/ML 100 UNIT/ML
5 SOLUTION INTRAVENOUS
Status: CANCELLED | OUTPATIENT
Start: 2024-04-01

## 2024-01-10 RX ORDER — KETOROLAC TROMETHAMINE 30 MG/ML
30 INJECTION, SOLUTION INTRAMUSCULAR; INTRAVENOUS ONCE
Status: COMPLETED | OUTPATIENT
Start: 2024-01-10 | End: 2024-01-10

## 2024-01-10 RX ORDER — HEPARIN SODIUM,PORCINE 10 UNIT/ML
5 VIAL (ML) INTRAVENOUS
Status: CANCELLED | OUTPATIENT
Start: 2024-04-01

## 2024-01-10 RX ORDER — DIPHENHYDRAMINE HCL 25 MG
25 CAPSULE ORAL
Status: COMPLETED | OUTPATIENT
Start: 2024-01-10 | End: 2024-01-10

## 2024-01-10 RX ORDER — HEPARIN SODIUM (PORCINE) LOCK FLUSH IV SOLN 100 UNIT/ML 100 UNIT/ML
5 SOLUTION INTRAVENOUS
Status: DISCONTINUED | OUTPATIENT
Start: 2024-01-10 | End: 2024-01-10 | Stop reason: HOSPADM

## 2024-01-10 RX ORDER — DIPHENHYDRAMINE HCL 25 MG
25 CAPSULE ORAL
Status: CANCELLED
Start: 2024-04-01

## 2024-01-10 RX ADMIN — SODIUM CHLORIDE, POTASSIUM CHLORIDE, SODIUM LACTATE AND CALCIUM CHLORIDE 1000 ML: 600; 310; 30; 20 INJECTION, SOLUTION INTRAVENOUS at 13:47

## 2024-01-10 RX ADMIN — KETOROLAC TROMETHAMINE 30 MG: 30 INJECTION, SOLUTION INTRAMUSCULAR; INTRAVENOUS at 13:55

## 2024-01-10 RX ADMIN — DIPHENHYDRAMINE HYDROCHLORIDE 25 MG: 25 CAPSULE ORAL at 13:55

## 2024-01-10 RX ADMIN — HYDROMORPHONE HYDROCHLORIDE 1 MG: 1 INJECTION, SOLUTION INTRAMUSCULAR; INTRAVENOUS; SUBCUTANEOUS at 15:55

## 2024-01-10 RX ADMIN — HYDROMORPHONE HYDROCHLORIDE 1 MG: 1 INJECTION, SOLUTION INTRAMUSCULAR; INTRAVENOUS; SUBCUTANEOUS at 14:55

## 2024-01-10 RX ADMIN — ONDANSETRON 8 MG: 4 TABLET, ORALLY DISINTEGRATING ORAL at 13:47

## 2024-01-10 RX ADMIN — Medication 5 ML: at 16:00

## 2024-01-10 RX ADMIN — HYDROMORPHONE HYDROCHLORIDE 1 MG: 1 INJECTION, SOLUTION INTRAMUSCULAR; INTRAVENOUS; SUBCUTANEOUS at 13:55

## 2024-01-10 ASSESSMENT — PAIN SCALES - GENERAL: PAINLEVEL: WORST PAIN (10)

## 2024-01-10 NOTE — PROGRESS NOTES
Infusion Nursing Note:  Jennifer Cervantes presents today for add-on IVF/pain medication.    Patient seen by provider today: No   present during visit today: Not Applicable.    Note: VSS. Meds and allergies reviewed. Pt reports 10/10 left arm pain that is characteristic of her typical sickle cell pain. Denies any other acute symptoms. Pt received 1L LR, 30 mg Toradol IVP, 25 mg Benadryl PO, 8 mg Zofran ODT, and 1 mg Dilaudid IVP x3 doses (total 3 mg). At time of discharge pt reported pain to be 6/10.      Intravenous Access:  Implanted Port.    Treatment Conditions:  Not Applicable.      Post Infusion Assessment:  Patient tolerated infusion without incident.  Blood return noted pre and post infusion.  Site patent and intact, free from redness, edema or discomfort.  No evidence of extravasations.  Access discontinued per protocol.       Discharge Plan:   Patient discharged in stable condition accompanied by: self.  Departure Mode: Ambulatory.      Jaida Kelly RN

## 2024-01-10 NOTE — TELEPHONE ENCOUNTER
BMT can take whenever she can get to clinic.     Message sent to  to arrange transportation for 1:30 appt for IVF/PM, paged Abdullahi Elliott to assist with arranging transportation.     Call placed to Jennifer and she can do 1:30 or 2pm appt.     Message sent to [facility] to schedule IVF/PM today with BMT at 1:30.     Updated infusion nurses that patient should be in between 1:30 and 2pm for IVF/PM.

## 2024-01-10 NOTE — TELEPHONE ENCOUNTER
Oncology Nurse Triage - Sickle Cell Pain Crisis:    Situation: Jennifer  calling about Sickle Cell Pain Crisis, requesting to be added on for IV fluids and pain medicine    Background:     Patient's last infusion was 1/8/24   Last clinic visit date:12/28/23 Patricia Mantilla   Does patient have active treatment plan?  Yes      Assessment of Symptoms:  Onset/Duration of symptoms: 1 day    Is it typical sickle cell pain? Yes   Location: arms   Character: Sharp           Intensity: 9/10    Any radiation of pain, numbness, tingling, weakness, warmth, swelling, discoloration of arms or legs?No     Fever?No  (if yes max temperature recorded in last 24 hours):      Chest Pain Present: No     Shortness of breath: No     Other home therapies tried: HEAT/HEATING PAD and WARM BATH     Last home medication taken and when: 10am oxycodone muscle relaxer and ibuprofen     Any Refills Needed?: No     Does patient have transportation & length of time to get to clinic: No needs help with transportation         Recommendations:     Added name to infusion list     If you do not hear from the infusion center by 2pm then you will not be able to get in for an infusion today. If symptoms worsen while waiting for call back, and/or you experience fever, chills, SOB, chest pain, cough, n/v, dizziness, numbness, swelling, discoloration of extremities, then seek emergency evaluation in Emergency Department.     Please note, if you are late for your appt, you risk losing your infusion appt as it may delay another patient's infusion who arrived on time.

## 2024-01-10 NOTE — PROGRESS NOTES
Community Health Worker Note: Telephone Call  Oncology Clinic     Data/Intervention:  Patient Name:  Jennifer Cervantes  /Age: [birth year]     Call From: Triage Nurse        Reason for Call:  Transportation      Assessment:     CHW also called [employer]   Patient will need to call when ready for return ride home [phone]. Talked to patient and she will call [employer] to setup  time.     Plan:  Patient is aware of the transportation plan. /CHW available to assist with any other needs.      Isaura OTTO Community Health Worker  Clinic Care Coordination  [facility]  Phone: [phone]

## 2024-01-12 ENCOUNTER — HOSPITAL ENCOUNTER (EMERGENCY)
Facility: CLINIC | Age: 24
Discharge: HOME | End: 2024-01-12
Attending: EMERGENCY MEDICINE | Admitting: EMERGENCY MEDICINE
Payer: COMMERCIAL

## 2024-01-12 VITALS
SYSTOLIC BLOOD PRESSURE: 114 MMHG | RESPIRATION RATE: 18 BRPM | OXYGEN SATURATION: 93 % | HEIGHT: 64 IN | TEMPERATURE: 98.3 F | DIASTOLIC BLOOD PRESSURE: 73 MMHG | BODY MASS INDEX: 24.92 KG/M2 | WEIGHT: 146 LBS | HEART RATE: 101 BPM

## 2024-01-12 DIAGNOSIS — D57.00 SICKLE CELL PAIN CRISIS (H): ICD-10-CM

## 2024-01-12 LAB
ANION GAP SERPL CALCULATED.3IONS-SCNC: 10 MMOL/L (ref 7–15)
BASOPHILS # BLD AUTO: 0.2 10E3/UL (ref 0–0.2)
BASOPHILS NFR BLD AUTO: 1 %
BUN SERPL-MCNC: 8.3 MG/DL (ref 6–20)
CALCIUM SERPL-MCNC: 9.3 MG/DL (ref 8.6–10)
CHLORIDE SERPL-SCNC: 104 MMOL/L (ref 98–107)
CREAT SERPL-MCNC: 0.56 MG/DL (ref 0.51–0.95)
DEPRECATED HCO3 PLAS-SCNC: 24 MMOL/L (ref 22–29)
EGFRCR SERPLBLD CKD-EPI 2021: >90 ML/MIN/1.73M2
EOSINOPHIL # BLD AUTO: 0.6 10E3/UL (ref 0–0.7)
EOSINOPHIL NFR BLD AUTO: 4 %
ERYTHROCYTE [DISTWIDTH] IN BLOOD BY AUTOMATED COUNT: 24.6 % (ref 10–15)
GLUCOSE SERPL-MCNC: 95 MG/DL (ref 70–99)
HCG SERPL QL: NEGATIVE
HCT VFR BLD AUTO: 21.1 % (ref 35–47)
HGB BLD-MCNC: 7.6 G/DL (ref 11.7–15.7)
IMM GRANULOCYTES # BLD: 0 10E3/UL
IMM GRANULOCYTES NFR BLD: 0 %
LYMPHOCYTES # BLD AUTO: 3.2 10E3/UL (ref 0.8–5.3)
LYMPHOCYTES NFR BLD AUTO: 24 %
MCH RBC QN AUTO: 30.8 PG (ref 26.5–33)
MCHC RBC AUTO-ENTMCNC: 36 G/DL (ref 31.5–36.5)
MCV RBC AUTO: 85 FL (ref 78–100)
MONOCYTES # BLD AUTO: 1.2 10E3/UL (ref 0–1.3)
MONOCYTES NFR BLD AUTO: 9 %
NEUTROPHILS # BLD AUTO: 8.6 10E3/UL (ref 1.6–8.3)
NEUTROPHILS NFR BLD AUTO: 62 %
NRBC # BLD AUTO: 0.2 10E3/UL
NRBC BLD AUTO-RTO: 2 /100
PLATELET # BLD AUTO: 455 10E3/UL (ref 150–450)
POTASSIUM SERPL-SCNC: 4.4 MMOL/L (ref 3.4–5.3)
RBC # BLD AUTO: 2.47 10E6/UL (ref 3.8–5.2)
RETICS # AUTO: 0.5 10E6/UL (ref 0.03–0.1)
RETICS/RBC NFR AUTO: 20.9 % (ref 0.5–2)
SODIUM SERPL-SCNC: 138 MMOL/L (ref 135–145)
WBC # BLD AUTO: 13.8 10E3/UL (ref 4–11)

## 2024-01-12 PROCEDURE — 96361 HYDRATE IV INFUSION ADD-ON: CPT | Performed by: EMERGENCY MEDICINE

## 2024-01-12 PROCEDURE — 36415 COLL VENOUS BLD VENIPUNCTURE: CPT | Performed by: EMERGENCY MEDICINE

## 2024-01-12 PROCEDURE — 84703 CHORIONIC GONADOTROPIN ASSAY: CPT | Performed by: EMERGENCY MEDICINE

## 2024-01-12 PROCEDURE — 258N000003 HC RX IP 258 OP 636: Performed by: EMERGENCY MEDICINE

## 2024-01-12 PROCEDURE — 250N000011 HC RX IP 250 OP 636: Performed by: EMERGENCY MEDICINE

## 2024-01-12 PROCEDURE — 96374 THER/PROPH/DIAG INJ IV PUSH: CPT | Performed by: EMERGENCY MEDICINE

## 2024-01-12 PROCEDURE — 96376 TX/PRO/DX INJ SAME DRUG ADON: CPT | Performed by: EMERGENCY MEDICINE

## 2024-01-12 PROCEDURE — 85045 AUTOMATED RETICULOCYTE COUNT: CPT | Performed by: EMERGENCY MEDICINE

## 2024-01-12 PROCEDURE — 80048 BASIC METABOLIC PNL TOTAL CA: CPT | Performed by: EMERGENCY MEDICINE

## 2024-01-12 PROCEDURE — 85025 COMPLETE CBC W/AUTO DIFF WBC: CPT | Performed by: EMERGENCY MEDICINE

## 2024-01-12 PROCEDURE — 96375 TX/PRO/DX INJ NEW DRUG ADDON: CPT | Performed by: EMERGENCY MEDICINE

## 2024-01-12 PROCEDURE — 99285 EMERGENCY DEPT VISIT HI MDM: CPT | Performed by: EMERGENCY MEDICINE

## 2024-01-12 PROCEDURE — 99284 EMERGENCY DEPT VISIT MOD MDM: CPT | Mod: 25 | Performed by: EMERGENCY MEDICINE

## 2024-01-12 RX ORDER — OXYCODONE HYDROCHLORIDE 10 MG/1
10 TABLET ORAL EVERY 4 HOURS PRN
Qty: 20 TABLET | Refills: 0 | Status: SHIPPED | OUTPATIENT
Start: 2024-01-12 | End: 2024-01-18

## 2024-01-12 RX ORDER — KETOROLAC TROMETHAMINE 30 MG/ML
30 INJECTION, SOLUTION INTRAMUSCULAR; INTRAVENOUS ONCE
Status: COMPLETED | OUTPATIENT
Start: 2024-01-12 | End: 2024-01-12

## 2024-01-12 RX ORDER — HEPARIN SODIUM (PORCINE) LOCK FLUSH IV SOLN 100 UNIT/ML 100 UNIT/ML
100 SOLUTION INTRAVENOUS ONCE
Status: COMPLETED | OUTPATIENT
Start: 2024-01-12 | End: 2024-01-12

## 2024-01-12 RX ORDER — ONDANSETRON 2 MG/ML
8 INJECTION INTRAMUSCULAR; INTRAVENOUS
Status: COMPLETED | OUTPATIENT
Start: 2024-01-12 | End: 2024-01-12

## 2024-01-12 RX ADMIN — HYDROMORPHONE HYDROCHLORIDE 1 MG: 1 INJECTION, SOLUTION INTRAMUSCULAR; INTRAVENOUS; SUBCUTANEOUS at 13:33

## 2024-01-12 RX ADMIN — HEPARIN 100 UNITS: 100 SYRINGE at 15:22

## 2024-01-12 RX ADMIN — HYDROMORPHONE HYDROCHLORIDE 1 MG: 1 INJECTION, SOLUTION INTRAMUSCULAR; INTRAVENOUS; SUBCUTANEOUS at 12:18

## 2024-01-12 RX ADMIN — HYDROMORPHONE HYDROCHLORIDE 1 MG: 1 INJECTION, SOLUTION INTRAMUSCULAR; INTRAVENOUS; SUBCUTANEOUS at 10:36

## 2024-01-12 RX ADMIN — SODIUM CHLORIDE, POTASSIUM CHLORIDE, SODIUM LACTATE AND CALCIUM CHLORIDE 1000 ML: 600; 310; 30; 20 INJECTION, SOLUTION INTRAVENOUS at 10:36

## 2024-01-12 RX ADMIN — ONDANSETRON 8 MG: 2 INJECTION INTRAMUSCULAR; INTRAVENOUS at 10:39

## 2024-01-12 RX ADMIN — KETOROLAC TROMETHAMINE 30 MG: 30 INJECTION, SOLUTION INTRAMUSCULAR; INTRAVENOUS at 13:33

## 2024-01-12 ASSESSMENT — ACTIVITIES OF DAILY LIVING (ADL)
ADLS_ACUITY_SCORE: 35
ADLS_ACUITY_SCORE: 33

## 2024-01-12 NOTE — TELEPHONE ENCOUNTER
Narcotic Refill Request    Medication(s) requested:  Oxycodone   Person Requesting Refill: Jennifer   What pain is the medication treating: Sickle cell pain   How is the medication being taken?:1 tab every 4 hours   Does pt have enough for today? Gonna be out has 1 dose left   Is pain being adequately controlled on the current regimen?: yes   Experiencing any side effects from medication?: no     Date of most recent appointment:  12/28/23 Patricia Mantilla     Next appointment:   2/23/24 Patricia Mantilla   Last fill date and by whom:  1/4/24 Patricia Mantilla    Reviewed:  No access     Send to provider: Patricia Mantilla

## 2024-01-12 NOTE — ED TRIAGE NOTES
Patient presents due to waking up at 0500 today due to pain. Patient took pain medication, muscle relaxer and ibuprofen at home with minimal relief. Patient reports 8/10 left arm and low back pain.     Triage Assessment (Adult)       Row Name 01/12/24 0852          Triage Assessment    Airway WDL WDL        Respiratory WDL    Respiratory WDL WDL        Skin Circulation/Temperature WDL    Skin Circulation/Temperature WDL WDL        Cardiac WDL    Cardiac WDL WDL        Peripheral/Neurovascular WDL    Peripheral Neurovascular WDL WDL        Cognitive/Neuro/Behavioral WDL    Cognitive/Neuro/Behavioral WDL WDL

## 2024-01-12 NOTE — ED PROVIDER NOTES
ED Provider Note  [facility]      History     Chief Complaint   Patient presents with    Sickle Cell Pain Crisis     Patient presents due to waking up at 0500 today due to pain. Patient took pain medication, muscle relaxer and ibuprofen at home with minimal relief. Patient reports 8/10 left arm and low back pain.     SHEILA Cervantes is a 23 year old female with past medical history of sickle cell disease presents with sickle cell pain episode. She reports waking up around 5AM this morning with pain. She endorses pain in her left arm and lower back, similar to her previous pain episodes. She rates her pain at an 8/10. She took her home pain medications (oxycodone, ibuprofen, and muscle relaxant) without relief so presented to the ED. She follows with VALENTINA sams (primary: Dr. Duncan) and has pain crisis plan in place. Currently denies fever, chest pain, abdominal pain, extremity swelling, headache, SOB, or cough. Denies any recent trauma/falls. States her last pain crisis occurred last week.     Past Medical History  Past Medical History:   Diagnosis Date    Anxiety     Bleeding disorder (H24)     Blood clotting disorder (H24)     Cerebral infarction (H) 2015    Cognitive developmental delay     low IQ. 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 side (H)     right hand contractures    Iron overload due to repeated red blood cell transfusions     Migraines     Multiple subsegmental pulmonary emboli without acute cor pulmonale (H) 02/01/2021    Oppositional defiant behavior     Presence of intrauterine contraceptive device 2/18/2020    Superficial venous thrombosis of arm, right 03/25/2021    Uncomplicated asthma      Past Surgical History:   Procedure Laterality Date    AS INSERT TUNNELED CV 2 CATH W/O PORT/PUMP      CHOLECYSTECTOMY      CV RIGHT HEART CATH  MEASUREMENTS RECORDED N/A 11/18/2021    Procedure: Right Heart Cath;  Surgeon: Jackson Stauffer MD;  Location:  HEART CARDIAC CATH LAB    INSERT PORT VASCULAR ACCESS Left 4/21/2021    Procedure: INSERTION, VASCULAR ACCESS PORT (NOT SURE ON SIDE UNTIL REMOVAL);  Surgeon: Rajan More MD;  Location: [facility] OR    IR CHEST PORT PLACEMENT > 5 YRS OF AGE  4/21/2021    IR CVC NON TUNNEL LINE REMOVAL  5/6/2021    IR CVC NON TUNNEL PLACEMENT > 5 YRS  04/07/2020    IR CVC NON TUNNEL PLACEMENT > 5 YRS  4/30/2021    IR CVC NON TUNNEL PLACEMENT > 5 YRS  9/7/2022    IR PORT REMOVAL LEFT  4/21/2021    REMOVE PORT VASCULAR ACCESS Left 4/21/2021    Procedure: REMOVAL, VASCULAR ACCESS PORT LEFT;  Surgeon: Rajan More MD;  Location: [facility] OR    REPAIR TENDON ELBOW Right 10/02/2019    Procedure: Right Elbow Flexor Lengthening, Flexor Pronator Slide Of Wrist and Finger, Thumb Adductor Lengthening;  Surgeon: Anai Franco MD;  Location: [facility] OR    TONSILLECTOMY Bilateral 10/02/2019    Procedure: Bilateral Tonsillectomy;  Surgeon: Farhana Guy MD;  Location: [facility] OR    ZZC BREAST REDUCTION (INCLUDES LIPO) TIER 3 Bilateral 04/23/2019     acetaminophen (TYLENOL) 325 MG tablet  albuterol (PROAIR HFA/PROVENTIL HFA/VENTOLIN HFA) 108 (90 Base) MCG/ACT inhaler  albuterol (PROVENTIL) (2.5 MG/3ML) 0.083% neb solution  aspirin (ASA) 81 MG chewable tablet  budesonide-formoterol (SYMBICORT) 160-4.5 MCG/ACT Inhaler  cetirizine (ZYRTEC) 10 MG tablet  deferasirox (JADENU) 360 MG tablet  diphenhydrAMINE (BENADRYL) 25 MG capsule  EPINEPHrine (ANY BX GENERIC EQUIV) 0.3 MG/0.3ML injection 2-pack  Hydroxyurea 1000 MG TABS  methocarbamol (ROBAXIN) 750 MG tablet  naloxone (NARCAN) 4 MG/0.1ML nasal spray  ondansetron (ZOFRAN) 8 MG tablet  oxyCODONE IR (ROXICODONE) 10 MG tablet      Allergies   Allergen Reactions    Contrast Dye      Hives and breathing issues    Fish-Derived Products Hives    Seafood Hives    Adhesive Tape Hives      "Primipore dressing causes hives    Gadolinium     Iodinated Contrast Media      Family History  Family History   Problem Relation Age of Onset    Sickle Cell Trait Mother     Hypertension Mother     Asthma Mother     Sickle Cell Trait Father     Glaucoma No family hx of     Macular Degeneration No family hx of     Diabetes No family hx of     Gout No family hx of      Social History   Social History     Tobacco Use    Smoking status: Never     Passive exposure: Never    Smokeless tobacco: Never   Substance Use Topics    Alcohol use: Not Currently     Alcohol/week: 0.0 standard drinks of alcohol    Drug use: Never         A medically appropriate review of systems was performed with pertinent positives and negatives noted in the HPI, and all other systems negative.    Physical Exam   BP: 114/73  Pulse: 101  Temp: 98.3  F (36.8  C)  Resp: 18  Height: 162.6 cm (5' 4\")  Weight: 66.2 kg (146 lb)  SpO2: 93 %  Physical Exam  Constitutional:       General: She is not in acute distress.     Appearance: Normal appearance. She is not toxic-appearing.   HENT:      Head: Normocephalic and atraumatic.      Nose: Nose normal. No congestion.      Mouth/Throat:      Mouth: Mucous membranes are moist.      Pharynx: Oropharynx is clear.   Eyes:      Extraocular Movements: Extraocular movements intact.      Pupils: Pupils are equal, round, and reactive to light.   Cardiovascular:      Rate and Rhythm: Normal rate and regular rhythm.      Heart sounds: No murmur heard.  Pulmonary:      Effort: Pulmonary effort is normal. No respiratory distress.      Breath sounds: No wheezing or rales.   Abdominal:      General: There is no distension.      Palpations: Abdomen is soft.      Tenderness: There is no abdominal tenderness. There is no guarding.   Musculoskeletal:         General: Normal range of motion.      Cervical back: Normal range of motion.   Skin:     General: Skin is warm and dry.   Neurological:      General: No focal deficit " present.      Mental Status: She is alert and oriented to person, place, and time.         ED Course, Procedures, & Data      Procedures                      Results for orders placed or performed during the hospital encounter of 01/12/24   Basic metabolic panel     Status: Normal   Result Value Ref Range    Sodium 138 135 - 145 mmol/L    Potassium 4.4 3.4 - 5.3 mmol/L    Chloride 104 98 - 107 mmol/L    Carbon Dioxide (CO2) 24 22 - 29 mmol/L    Anion Gap 10 7 - 15 mmol/L    Urea Nitrogen 8.3 6.0 - 20.0 mg/dL    Creatinine 0.56 0.51 - 0.95 mg/dL    GFR Estimate >90 >60 mL/min/1.73m2    Calcium 9.3 8.6 - 10.0 mg/dL    Glucose 95 70 - 99 mg/dL   Reticulocyte count     Status: Abnormal   Result Value Ref Range    % Reticulocyte 20.9 (H) 0.5 - 2.0 %    Absolute Reticulocyte 0.498 (H) 0.025 - 0.095 10e6/uL   HCG qualitative pregnancy (blood)     Status: Normal   Result Value Ref Range    hCG Serum Qualitative Negative Negative   CBC with platelets and differential     Status: Abnormal   Result Value Ref Range    WBC Count 13.8 (H) 4.0 - 11.0 10e3/uL    RBC Count 2.47 (L) 3.80 - 5.20 10e6/uL    Hemoglobin 7.6 (L) 11.7 - 15.7 g/dL    Hematocrit 21.1 (L) 35.0 - 47.0 %    MCV 85 78 - 100 fL    MCH 30.8 26.5 - 33.0 pg    MCHC 36.0 31.5 - 36.5 g/dL    RDW 24.6 (H) 10.0 - 15.0 %    Platelet Count 455 (H) 150 - 450 10e3/uL    % Neutrophils 62 %    % Lymphocytes 24 %    % Monocytes 9 %    % Eosinophils 4 %    % Basophils 1 %    % Immature Granulocytes 0 %    NRBCs per 100 WBC 2 (H) <1 /100    Absolute Neutrophils 8.6 (H) 1.6 - 8.3 10e3/uL    Absolute Lymphocytes 3.2 0.8 - 5.3 10e3/uL    Absolute Monocytes 1.2 0.0 - 1.3 10e3/uL    Absolute Eosinophils 0.6 0.0 - 0.7 10e3/uL    Absolute Basophils 0.2 0.0 - 0.2 10e3/uL    Absolute Immature Granulocytes 0.0 <=0.4 10e3/uL    Absolute NRBCs 0.2 10e3/uL   CBC with platelets differential     Status: Abnormal    Narrative    The following orders were created for panel order CBC with  platelets differential.  Procedure                               Abnormality         Status                     ---------                               -----------         ------                     CBC with platelets and d...[[id number]]  Abnormal            Final result                 Please view results for these tests on the individual orders.     Medications   lactated ringers BOLUS 1,000 mL (1,000 mLs Intravenous $New Bag 1/12/24 1036)   HYDROmorphone (DILAUDID) injection 1 mg (1 mg Intravenous $Given 1/12/24 1333)   ondansetron (ZOFRAN) injection 8 mg (8 mg Intravenous $Given 1/12/24 1039)   ketorolac (TORADOL) injection 30 mg (30 mg Intravenous $Given 1/12/24 1333)     Labs Ordered and Resulted from Time of ED Arrival to Time of ED Departure   RETICULOCYTE COUNT - Abnormal       Result Value    % Reticulocyte 20.9 (*)     Absolute Reticulocyte 0.498 (*)    CBC WITH PLATELETS AND DIFFERENTIAL - Abnormal    WBC Count 13.8 (*)     RBC Count 2.47 (*)     Hemoglobin 7.6 (*)     Hematocrit 21.1 (*)     MCV 85      MCH 30.8      MCHC 36.0      RDW 24.6 (*)     Platelet Count 455 (*)     % Neutrophils 62      % Lymphocytes 24      % Monocytes 9      % Eosinophils 4      % Basophils 1      % Immature Granulocytes 0      NRBCs per 100 WBC 2 (*)     Absolute Neutrophils 8.6 (*)     Absolute Lymphocytes 3.2      Absolute Monocytes 1.2      Absolute Eosinophils 0.6      Absolute Basophils 0.2      Absolute Immature Granulocytes 0.0      Absolute NRBCs 0.2     BASIC METABOLIC PANEL - Normal    Sodium 138      Potassium 4.4      Chloride 104      Carbon Dioxide (CO2) 24      Anion Gap 10      Urea Nitrogen 8.3      Creatinine 0.56      GFR Estimate >90      Calcium 9.3      Glucose 95     HCG QUALITATIVE PREGNANCY - Normal    hCG Serum Qualitative Negative       No orders to display            Assessment & Plan    Jennifer Cervantes is a 23 year old female with past medical history of sickle cell disease presents with sickle  cell pain episode. Patient started on IV dilaudid and IV fluids per heme pain crisis plan. Hgb 7.6 (baseline for her).     She reports symptoms are consistent with her prior pain crises. No chest pain or SOB or fevers. Blood work showed stable anemia, elevated reticulocyte count. BMP within normal limits. She was treated following her care plan. On re-evaluation she reported improvement in symptoms. She tolerated PO. She was stable for discharge. Return precautions discussed, all questions answered.    I have reviewed the nursing notes. I have reviewed the findings, diagnosis, plan and need for follow up with the patient.    New Prescriptions    No medications on file       Final diagnoses:   Sickle cell pain crisis (H)     Ivonne Keita   MS4 [employer]       [facility] EMERGENCY DEPARTMENT  1/12/2024    --    ED Attending Physician Attestation    I Suraj Pino MD, cared for this patient with the Medical Student. I performed, or re-performed, the physical exam and medical decision-making. I have verified the accuracy of all the medical student findings and documentation above, and have edited as necessary.    Summary of HPI, PE, ED Course   Patient is a 23 year old female evaluated in the emergency department for sickle cell pain crisis. Exam and ED course notable for blood work at baseline. After the completion of care in the emergency department, the patient was discharged.    Critical Care & Procedures  Not applicable.        Medical Decision Making  The patient's presentation was of high complexity (a chronic illness severe exacerbation, progression, or side effect of treatment).    The patient's evaluation involved:  review of external note(s) from 3+ sources (infusion therapy note, telephone encounter, clinic note)  review of 3+ test result(s) ordered prior to this encounter (cbc, reticulocyte count, bmp)  ordering and/or review of 3+ test(s) in this encounter (see separate area of  note for details)    The patient's management necessitated high risk (a parenteral controlled substance).          Suraj Pino MD  Emergency Medicine        Suraj Pino MD  01/12/24 9021

## 2024-01-15 ENCOUNTER — NURSE TRIAGE (OUTPATIENT)
Dept: ONCOLOGY | Facility: CLINIC | Age: 24
End: 2024-01-15
Payer: COMMERCIAL

## 2024-01-15 ENCOUNTER — INFUSION THERAPY VISIT (OUTPATIENT)
Dept: INFUSION THERAPY | Facility: CLINIC | Age: 24
End: 2024-01-15
Attending: PEDIATRICS
Payer: COMMERCIAL

## 2024-01-15 VITALS
TEMPERATURE: 97.9 F | HEART RATE: 80 BPM | SYSTOLIC BLOOD PRESSURE: 126 MMHG | DIASTOLIC BLOOD PRESSURE: 75 MMHG | OXYGEN SATURATION: 95 % | RESPIRATION RATE: 16 BRPM

## 2024-01-15 DIAGNOSIS — G81.10 SPASTIC HEMIPLEGIA, UNSPECIFIED ETIOLOGY, UNSPECIFIED LATERALITY (H): ICD-10-CM

## 2024-01-15 DIAGNOSIS — D57.00 SICKLE CELL PAIN CRISIS (H): Primary | ICD-10-CM

## 2024-01-15 PROCEDURE — 250N000011 HC RX IP 250 OP 636: Performed by: PEDIATRICS

## 2024-01-15 PROCEDURE — 96375 TX/PRO/DX INJ NEW DRUG ADDON: CPT

## 2024-01-15 PROCEDURE — 96374 THER/PROPH/DIAG INJ IV PUSH: CPT

## 2024-01-15 PROCEDURE — 96361 HYDRATE IV INFUSION ADD-ON: CPT

## 2024-01-15 PROCEDURE — 96376 TX/PRO/DX INJ SAME DRUG ADON: CPT

## 2024-01-15 PROCEDURE — 250N000013 HC RX MED GY IP 250 OP 250 PS 637: Performed by: PEDIATRICS

## 2024-01-15 PROCEDURE — 258N000003 HC RX IP 258 OP 636: Performed by: PEDIATRICS

## 2024-01-15 RX ORDER — HEPARIN SODIUM (PORCINE) LOCK FLUSH IV SOLN 100 UNIT/ML 100 UNIT/ML
5 SOLUTION INTRAVENOUS
Status: DISCONTINUED | OUTPATIENT
Start: 2024-01-15 | End: 2024-01-15 | Stop reason: HOSPADM

## 2024-01-15 RX ORDER — ONDANSETRON 2 MG/ML
8 INJECTION INTRAMUSCULAR; INTRAVENOUS EVERY 6 HOURS PRN
Status: CANCELLED
Start: 2024-04-01

## 2024-01-15 RX ORDER — DIPHENHYDRAMINE HCL 25 MG
25 CAPSULE ORAL
Status: CANCELLED
Start: 2024-04-01

## 2024-01-15 RX ORDER — HEPARIN SODIUM (PORCINE) LOCK FLUSH IV SOLN 100 UNIT/ML 100 UNIT/ML
5 SOLUTION INTRAVENOUS
Status: CANCELLED | OUTPATIENT
Start: 2024-04-01

## 2024-01-15 RX ORDER — HEPARIN SODIUM,PORCINE 10 UNIT/ML
5 VIAL (ML) INTRAVENOUS
Status: CANCELLED | OUTPATIENT
Start: 2024-04-01

## 2024-01-15 RX ORDER — KETOROLAC TROMETHAMINE 30 MG/ML
30 INJECTION, SOLUTION INTRAMUSCULAR; INTRAVENOUS ONCE
Status: CANCELLED
Start: 2024-04-01 | End: 2024-04-01

## 2024-01-15 RX ORDER — DIPHENHYDRAMINE HCL 25 MG
25 CAPSULE ORAL
Status: COMPLETED | OUTPATIENT
Start: 2024-01-15 | End: 2024-01-15

## 2024-01-15 RX ORDER — ONDANSETRON 2 MG/ML
8 INJECTION INTRAMUSCULAR; INTRAVENOUS EVERY 6 HOURS PRN
Status: DISCONTINUED | OUTPATIENT
Start: 2024-01-15 | End: 2024-01-15 | Stop reason: HOSPADM

## 2024-01-15 RX ORDER — KETOROLAC TROMETHAMINE 30 MG/ML
30 INJECTION, SOLUTION INTRAMUSCULAR; INTRAVENOUS ONCE
Status: COMPLETED | OUTPATIENT
Start: 2024-01-15 | End: 2024-01-15

## 2024-01-15 RX ORDER — ONDANSETRON 4 MG/1
8 TABLET, FILM COATED ORAL
Status: CANCELLED
Start: 2024-04-01

## 2024-01-15 RX ADMIN — KETOROLAC TROMETHAMINE 30 MG: 30 INJECTION, SOLUTION INTRAMUSCULAR; INTRAVENOUS at 09:29

## 2024-01-15 RX ADMIN — HYDROMORPHONE HYDROCHLORIDE 1 MG: 1 INJECTION, SOLUTION INTRAMUSCULAR; INTRAVENOUS; SUBCUTANEOUS at 10:29

## 2024-01-15 RX ADMIN — ONDANSETRON 8 MG: 2 INJECTION INTRAMUSCULAR; INTRAVENOUS at 09:32

## 2024-01-15 RX ADMIN — SODIUM CHLORIDE, POTASSIUM CHLORIDE, SODIUM LACTATE AND CALCIUM CHLORIDE 1000 ML: 600; 310; 30; 20 INJECTION, SOLUTION INTRAVENOUS at 09:36

## 2024-01-15 RX ADMIN — DIPHENHYDRAMINE HYDROCHLORIDE 25 MG: 25 CAPSULE ORAL at 09:35

## 2024-01-15 RX ADMIN — HYDROMORPHONE HYDROCHLORIDE 1 MG: 1 INJECTION, SOLUTION INTRAMUSCULAR; INTRAVENOUS; SUBCUTANEOUS at 09:25

## 2024-01-15 RX ADMIN — HYDROMORPHONE HYDROCHLORIDE 1 MG: 1 INJECTION, SOLUTION INTRAMUSCULAR; INTRAVENOUS; SUBCUTANEOUS at 11:32

## 2024-01-15 RX ADMIN — Medication 5 ML: at 11:53

## 2024-01-15 NOTE — TELEPHONE ENCOUNTER
Oncology Nurse Triage - Sickle Cell Pain Crisis:    Situation: Jennifer  calling about Sickle Cell Pain Crisis, requesting to be added on for IV fluids and pain medicine    Background:     Patient's last infusion was 1/12  Last clinic visit date: 23/38  Does patient have active treatment plan?  Yes      Assessment of Symptoms:  Onset/Duration of symptoms: 1 day    Is it typical sickle cell pain? Yes   Location: legs  Character: Sharp           Intensity: moderate    Any radiation of pain, numbness, tingling, weakness, warmth, swelling, discoloration of arms or legs?No     Fever?No  (if yes max temperature recorded in last 24 hours):      Chest Pain Present: No     Shortness of breath: No     Other home therapies tried: WARM BATH   Oxy     Last home medication taken and when: Oxy-1 hour ago    Any Refills Needed?: No     Does patient have transportation & length of time to get to clinic: Yes         Recommendations:     IVF/Pain med.  Patient scheduled for 9 am today.    If you do not hear from the infusion center by 2pm then you will not be able to get in for an infusion today. If symptoms worsen while waiting for call back, and/or you experience fever, chills, SOB, chest pain, cough, n/v, dizziness, numbIVF/Paness, swelling, discoloration of extremities, then seek emergency evaluation in Emergency Department.     Please note, if you are late for your appt, you risk losing your infusion appt as it may delay another patient's infusion who arrived on time.

## 2024-01-15 NOTE — PROGRESS NOTES
Infusion Nursing Note:  Jennifer Cervantes presents today for Sickle cell crisis- IVF, pain medication.    Patient seen by provider today: No   present during visit today: Not Applicable.    Note:   Patient reports generalized arm and leg pain, 9/10 on arrival. 3 doses of IV dilaudid given per orders,  patient reports pain at 5/10 on discharge, which is tolerable.    Intravenous Access:  Implanted Port.    Treatment Conditions:  Not Applicable.    Post Infusion Assessment:  Patient tolerated infusion without incident.  Blood return noted pre and post infusion.  Site patent and intact, free from redness, edema or discomfort.  No evidence of extravasations.  Access discontinued per protocol.     Discharge Plan:   Patient and/or family verbalized understanding of discharge instructions and all questions answered.  AVS to patient via [employer].  Patient will return as needed for next appointment.   Patient discharged in stable condition accompanied by: self.  Departure Mode: Ambulatory.    Administrations This Visit       diphenhydrAMINE (BENADRYL) capsule 25 mg       Admin Date  01/15/2024 Action  $Given Dose  25 mg Route  Oral Documented By  Echo Perez RN              heparin 100 unit/mL injection 5 mL       Admin Date  01/15/2024 Action  $Given Dose  5 mL Route  Intracatheter Documented By  Roz Arellano RN              HYDROmorphone (DILAUDID) injection 1 mg       Admin Date  01/15/2024 Action  $Given Dose  1 mg Route  Intravenous Documented By  Echo Perez RN               Admin Date  01/15/2024 Action  $Given Dose  1 mg Route  Intravenous Documented By  Echo Perez RN               Admin Date  01/15/2024 Action  $Given Dose  1 mg Route  Intravenous Documented By  Roz Arellano RN              ketorolac (TORADOL) injection 30 mg       Admin Date  01/15/2024 Action  $Given Dose  30 mg Route  Intravenous Documented By  Echo Perez RN              lactated ringers BOLUS  1,000 mL       Admin Date  01/15/2024 Action  $New Bag Dose  1,000 mL Rate  500 mL/hr Route  Intravenous Documented By  Echo Perez RN              ondansetron (ZOFRAN) injection 8 mg       Admin Date  01/15/2024 Action  $Given Dose  8 mg Route  Intravenous Documented By  Echo Perez RN              sodium chloride (PF) 0.9% PF flush 3-20 mL       Admin Date  01/15/2024 Action  $Given Dose  20 mL Route  Intracatheter Documented By  Roz Arellano RN                  /80 (BP Location: Left arm)   Pulse 94   Temp 97.9  F (36.6  C) (Oral)   Resp 16   SpO2 95%     Echo Perez RN

## 2024-01-15 NOTE — PATIENT INSTRUCTIONS
Dear Jennifer Cervantes    Thank you for choosing [facility] ([facility]) for your infusion.  The following information is a summary of our appointment as well as important reminders.      We look forward in seeing you on your next appointment here at [facility] ([facility]).  Please don t hesitate to call us at [phone] to reschedule any of your appointments or to speak with one of the [facility] registered nurses.  It was a pleasure taking care of you today.    Sincerely,    [facility]  [address] [place] [address]  [id number]  Phone:  [phone]

## 2024-01-18 ENCOUNTER — INFUSION THERAPY VISIT (OUTPATIENT)
Dept: TRANSPLANT | Facility: CLINIC | Age: 24
End: 2024-01-18
Attending: PEDIATRICS
Payer: COMMERCIAL

## 2024-01-18 ENCOUNTER — PATIENT OUTREACH (OUTPATIENT)
Dept: CARE COORDINATION | Facility: CLINIC | Age: 24
End: 2024-01-18
Payer: COMMERCIAL

## 2024-01-18 VITALS
OXYGEN SATURATION: 93 % | RESPIRATION RATE: 16 BRPM | DIASTOLIC BLOOD PRESSURE: 74 MMHG | HEART RATE: 86 BPM | TEMPERATURE: 98.6 F | SYSTOLIC BLOOD PRESSURE: 120 MMHG

## 2024-01-18 DIAGNOSIS — G81.10 SPASTIC HEMIPLEGIA, UNSPECIFIED ETIOLOGY, UNSPECIFIED LATERALITY (H): ICD-10-CM

## 2024-01-18 DIAGNOSIS — D57.00 SICKLE CELL PAIN CRISIS (H): ICD-10-CM

## 2024-01-18 DIAGNOSIS — K29.00 OTHER ACUTE GASTRITIS WITHOUT HEMORRHAGE: ICD-10-CM

## 2024-01-18 DIAGNOSIS — D57.00 SICKLE CELL PAIN CRISIS (H): Primary | ICD-10-CM

## 2024-01-18 PROCEDURE — 96361 HYDRATE IV INFUSION ADD-ON: CPT

## 2024-01-18 PROCEDURE — 96375 TX/PRO/DX INJ NEW DRUG ADDON: CPT

## 2024-01-18 PROCEDURE — 250N000013 HC RX MED GY IP 250 OP 250 PS 637: Performed by: PEDIATRICS

## 2024-01-18 PROCEDURE — 96376 TX/PRO/DX INJ SAME DRUG ADON: CPT

## 2024-01-18 PROCEDURE — 250N000011 HC RX IP 250 OP 636: Performed by: PEDIATRICS

## 2024-01-18 PROCEDURE — 96374 THER/PROPH/DIAG INJ IV PUSH: CPT

## 2024-01-18 PROCEDURE — 258N000003 HC RX IP 258 OP 636: Performed by: PEDIATRICS

## 2024-01-18 RX ORDER — KETOROLAC TROMETHAMINE 30 MG/ML
30 INJECTION, SOLUTION INTRAMUSCULAR; INTRAVENOUS ONCE
Status: COMPLETED | OUTPATIENT
Start: 2024-01-18 | End: 2024-01-18

## 2024-01-18 RX ORDER — ONDANSETRON 4 MG/1
8 TABLET, FILM COATED ORAL
Status: CANCELLED
Start: 2024-04-01

## 2024-01-18 RX ORDER — KETOROLAC TROMETHAMINE 30 MG/ML
30 INJECTION, SOLUTION INTRAMUSCULAR; INTRAVENOUS ONCE
Status: CANCELLED
Start: 2024-04-01 | End: 2024-04-01

## 2024-01-18 RX ORDER — HEPARIN SODIUM (PORCINE) LOCK FLUSH IV SOLN 100 UNIT/ML 100 UNIT/ML
5 SOLUTION INTRAVENOUS
Status: DISCONTINUED | OUTPATIENT
Start: 2024-01-18 | End: 2024-01-18 | Stop reason: HOSPADM

## 2024-01-18 RX ORDER — HEPARIN SODIUM,PORCINE 10 UNIT/ML
5 VIAL (ML) INTRAVENOUS
Status: CANCELLED | OUTPATIENT
Start: 2024-04-01

## 2024-01-18 RX ORDER — OXYCODONE HYDROCHLORIDE 10 MG/1
10 TABLET ORAL EVERY 4 HOURS PRN
Qty: 20 TABLET | Refills: 0 | Status: SHIPPED | OUTPATIENT
Start: 2024-01-19 | End: 2024-01-24

## 2024-01-18 RX ORDER — DIPHENHYDRAMINE HCL 25 MG
25 CAPSULE ORAL
Status: CANCELLED
Start: 2024-04-01

## 2024-01-18 RX ORDER — HEPARIN SODIUM (PORCINE) LOCK FLUSH IV SOLN 100 UNIT/ML 100 UNIT/ML
5 SOLUTION INTRAVENOUS
Status: CANCELLED | OUTPATIENT
Start: 2024-04-01

## 2024-01-18 RX ORDER — ONDANSETRON 2 MG/ML
8 INJECTION INTRAMUSCULAR; INTRAVENOUS EVERY 6 HOURS PRN
Status: CANCELLED
Start: 2024-04-01

## 2024-01-18 RX ORDER — ONDANSETRON 2 MG/ML
8 INJECTION INTRAMUSCULAR; INTRAVENOUS EVERY 6 HOURS PRN
Status: DISCONTINUED | OUTPATIENT
Start: 2024-01-18 | End: 2024-01-18 | Stop reason: HOSPADM

## 2024-01-18 RX ORDER — ONDANSETRON 8 MG/1
8 TABLET, FILM COATED ORAL EVERY 8 HOURS PRN
Qty: 30 TABLET | Refills: 1 | Status: SHIPPED | OUTPATIENT
Start: 2024-01-18 | End: 2024-06-17

## 2024-01-18 RX ORDER — DIPHENHYDRAMINE HCL 25 MG
25 CAPSULE ORAL
Status: COMPLETED | OUTPATIENT
Start: 2024-01-18 | End: 2024-01-18

## 2024-01-18 RX ADMIN — ONDANSETRON 8 MG: 2 INJECTION INTRAMUSCULAR; INTRAVENOUS at 10:15

## 2024-01-18 RX ADMIN — DIPHENHYDRAMINE HYDROCHLORIDE 25 MG: 25 CAPSULE ORAL at 10:24

## 2024-01-18 RX ADMIN — HYDROMORPHONE HYDROCHLORIDE 1 MG: 1 INJECTION, SOLUTION INTRAMUSCULAR; INTRAVENOUS; SUBCUTANEOUS at 10:24

## 2024-01-18 RX ADMIN — HYDROMORPHONE HYDROCHLORIDE 1 MG: 1 INJECTION, SOLUTION INTRAMUSCULAR; INTRAVENOUS; SUBCUTANEOUS at 12:31

## 2024-01-18 RX ADMIN — HYDROMORPHONE HYDROCHLORIDE 1 MG: 1 INJECTION, SOLUTION INTRAMUSCULAR; INTRAVENOUS; SUBCUTANEOUS at 11:23

## 2024-01-18 RX ADMIN — KETOROLAC TROMETHAMINE 30 MG: 30 INJECTION, SOLUTION INTRAMUSCULAR; INTRAVENOUS at 10:20

## 2024-01-18 RX ADMIN — Medication 5 ML: at 13:09

## 2024-01-18 RX ADMIN — SODIUM CHLORIDE, POTASSIUM CHLORIDE, SODIUM LACTATE AND CALCIUM CHLORIDE 1000 ML: 600; 310; 30; 20 INJECTION, SOLUTION INTRAVENOUS at 10:12

## 2024-01-18 ASSESSMENT — PAIN SCALES - GENERAL: PAINLEVEL: EXTREME PAIN (9)

## 2024-01-18 NOTE — TELEPHONE ENCOUNTER
Oncology Nurse Triage - Sickle Cell Pain Crisis:    Situation: Jennifer  calling about Sickle Cell Pain Crisis, requesting to be added on for IV fluids and pain medicine    Background:     Patient's last infusion was 01/15/24  Last clinic visit date:12/28/23 w/Patricia Mantilla  Does patient have active treatment plan?  Yes      Assessment of Symptoms:  Onset/Duration of symptoms: 1 day    Is it typical sickle cell pain? Yes   Location: Everywhere  Character: Sharp and stabbing           Intensity: 9/10    Any radiation of pain, numbness, tingling, weakness, warmth, swelling, discoloration of arms or legs?No     Fever?No  (if yes max temperature recorded in last 24 hours):      Chest Pain Present: No     Shortness of breath: No     Other home therapies tried: HEAT/HEATING PAD and WARM BATH     Last home medication taken and when: oxycodone and ibuprofen 0600    Any Refills Needed?: Yes, for tomorrow, oxycodone.    Does patient have transportation & length of time to get to clinic: Yes         Recommendations:   If you do not hear from the infusion center by 2pm then you will not be able to get in for an infusion today. If symptoms worsen while waiting for call back, and/or you experience fever, chills, SOB, chest pain, cough, n/v, dizziness, numbness, swelling, discoloration of extremities, then seek emergency evaluation in Emergency Department.     Added to infusion wait list for IVF/pain meds per protocol.

## 2024-01-18 NOTE — PROGRESS NOTES
"Infusion Nursing Note:  Jennifer Cervantes presents today for IV Fluids and IV Dilaudid/Zofran/Toradol. See MAR.     Patient seen by provider today: No   present during visit today: Not Applicable.    Note: Pt arrived today rating her Pain a \"9/10\". States the pain is \"all over\". Pt also complaining of Nausea. No Vomiting today, but reports one episode of emesis last evening. LR 1L IV infused over 2 hours via Port-a-Cath per orders. Pt also received oral Benadryl (25 mg), Zofran IV, Toradol, and Dilaudid 1 mg IV x 3 (each dose 1 hour apart). Prior to discharge Pt rating her Pain a \"4/10\", stating it's much improved. Also reports Nausea is gone. Pt left ambulatory, in stable condition.       Intravenous Access:  Implanted Port.    Treatment Conditions:  Not Applicable.      Post Infusion Assessment:  Patient tolerated infusion without incident.  Access discontinued per protocol.       Discharge Plan:   Prescription refills given for Zofran. To be filled at [facility] pharmacy- Pt will  on her way out.   Patient discharged in stable condition accompanied by: self.  Departure Mode: Ambulatory.      Farhana Rust RN    "

## 2024-01-18 NOTE — TELEPHONE ENCOUNTER
Available time with BMT at 10am. Call to pt to verify she can make appt. Pt confirming she is able to make 10 am appt. Request sent to [facility] and SW to have pt scheduled and help with transport.      Pt requesting Zofran prescription. Pt states she has been having nausea and vomiting the last two days.

## 2024-01-18 NOTE — PROGRESS NOTES
Social Work - Transportation  [facility]    Data/Intervention:  Patient Name: Jennifer Cervantes Goes By: Jennifer    /Age: 2000 (23 year old)    Referral From: [facility]   Reason for Referral:  support requested for patient transportation needs for today's infusion.  Assessment:  called [employer] to arrange ride through patient's insurance. [employer] arranged  for patient from home with [employer]. Patient will need to call [phone] when ready for return ride home.  Plan: Patient is aware of the transportation plan.  available to assist with any other needs.    CARLOS Chavez,SW  Hematology/Oncology Social Worker  Phone:[phone] Pager: [phone]

## 2024-01-18 NOTE — TELEPHONE ENCOUNTER
Narcotic Refill Request    Medication(s) requested:  Oxycodone  Person Requesting Refill: patient  What pain is the medication treating:  Sickle cell pain  How is the medication being taken?:as prescribed  Does pt have enough for today? Yes, will run out tomorrow  Is pain being adequately controlled on the current regimen?: Yes  Experiencing any side effects from medication?: No    Last clinic visit date:12/28/23 w/Patricia Mantilla   Any No Show Visits: No  Next appointment:   02/23/24  Last fill date and by whom:  01/12/24 by Patricia Mantilla   Reviewed: No access    Routed provider: Patricia Mantilla    Pt states she is asking for refill today so it can be signed tomorrow when she runs out.

## 2024-01-18 NOTE — NURSING NOTE
Chief Complaint   Patient presents with    Infusion     Pt here for IV Fluids and IV Dilaudid, Zofran, and Toradol. Pt with Sickle Cell Disease.      Farhana Rust RN

## 2024-01-22 ENCOUNTER — PATIENT OUTREACH (OUTPATIENT)
Dept: CARE COORDINATION | Facility: CLINIC | Age: 24
End: 2024-01-22
Payer: COMMERCIAL

## 2024-01-22 ENCOUNTER — INFUSION THERAPY VISIT (OUTPATIENT)
Dept: ONCOLOGY | Facility: CLINIC | Age: 24
End: 2024-01-22
Attending: PEDIATRICS
Payer: COMMERCIAL

## 2024-01-22 ENCOUNTER — NURSE TRIAGE (OUTPATIENT)
Dept: ONCOLOGY | Facility: CLINIC | Age: 24
End: 2024-01-22
Payer: COMMERCIAL

## 2024-01-22 DIAGNOSIS — R11.2 NAUSEA AND VOMITING, UNSPECIFIED VOMITING TYPE: ICD-10-CM

## 2024-01-22 DIAGNOSIS — G81.10 SPASTIC HEMIPLEGIA, UNSPECIFIED ETIOLOGY, UNSPECIFIED LATERALITY (H): ICD-10-CM

## 2024-01-22 DIAGNOSIS — R14.0 ABDOMINAL DISTENTION: ICD-10-CM

## 2024-01-22 DIAGNOSIS — D57.1 HB-SS DISEASE WITHOUT CRISIS (H): Primary | ICD-10-CM

## 2024-01-22 DIAGNOSIS — D57.00 SICKLE CELL PAIN CRISIS (H): Primary | ICD-10-CM

## 2024-01-22 PROCEDURE — 96376 TX/PRO/DX INJ SAME DRUG ADON: CPT

## 2024-01-22 PROCEDURE — 96374 THER/PROPH/DIAG INJ IV PUSH: CPT

## 2024-01-22 PROCEDURE — 250N000011 HC RX IP 250 OP 636: Performed by: PEDIATRICS

## 2024-01-22 PROCEDURE — 250N000013 HC RX MED GY IP 250 OP 250 PS 637: Performed by: PEDIATRICS

## 2024-01-22 PROCEDURE — 96361 HYDRATE IV INFUSION ADD-ON: CPT

## 2024-01-22 PROCEDURE — 99215 OFFICE O/P EST HI 40 MIN: CPT | Performed by: REGISTERED NURSE

## 2024-01-22 PROCEDURE — 258N000003 HC RX IP 258 OP 636: Performed by: PEDIATRICS

## 2024-01-22 PROCEDURE — 96375 TX/PRO/DX INJ NEW DRUG ADDON: CPT

## 2024-01-22 RX ORDER — ONDANSETRON 2 MG/ML
8 INJECTION INTRAMUSCULAR; INTRAVENOUS EVERY 6 HOURS PRN
Status: CANCELLED
Start: 2024-04-01

## 2024-01-22 RX ORDER — DIPHENHYDRAMINE HCL 25 MG
25 CAPSULE ORAL
Status: CANCELLED
Start: 2024-04-01

## 2024-01-22 RX ORDER — HEPARIN SODIUM (PORCINE) LOCK FLUSH IV SOLN 100 UNIT/ML 100 UNIT/ML
5 SOLUTION INTRAVENOUS
Status: CANCELLED | OUTPATIENT
Start: 2024-04-01

## 2024-01-22 RX ORDER — KETOROLAC TROMETHAMINE 30 MG/ML
30 INJECTION, SOLUTION INTRAMUSCULAR; INTRAVENOUS ONCE
Status: COMPLETED | OUTPATIENT
Start: 2024-01-22 | End: 2024-01-22

## 2024-01-22 RX ORDER — DIPHENHYDRAMINE HCL 25 MG
25 CAPSULE ORAL
Status: COMPLETED | OUTPATIENT
Start: 2024-01-22 | End: 2024-01-22

## 2024-01-22 RX ORDER — ONDANSETRON 2 MG/ML
8 INJECTION INTRAMUSCULAR; INTRAVENOUS EVERY 6 HOURS PRN
Status: DISCONTINUED | OUTPATIENT
Start: 2024-01-22 | End: 2024-01-22 | Stop reason: HOSPADM

## 2024-01-22 RX ORDER — HEPARIN SODIUM,PORCINE 10 UNIT/ML
5 VIAL (ML) INTRAVENOUS
Status: CANCELLED | OUTPATIENT
Start: 2024-04-01

## 2024-01-22 RX ORDER — ONDANSETRON 4 MG/1
8 TABLET, FILM COATED ORAL
Status: CANCELLED
Start: 2024-04-01

## 2024-01-22 RX ORDER — HEPARIN SODIUM (PORCINE) LOCK FLUSH IV SOLN 100 UNIT/ML 100 UNIT/ML
5 SOLUTION INTRAVENOUS
Status: DISCONTINUED | OUTPATIENT
Start: 2024-01-22 | End: 2024-01-22 | Stop reason: HOSPADM

## 2024-01-22 RX ORDER — KETOROLAC TROMETHAMINE 30 MG/ML
30 INJECTION, SOLUTION INTRAMUSCULAR; INTRAVENOUS ONCE
Status: CANCELLED
Start: 2024-04-01 | End: 2024-04-01

## 2024-01-22 RX ADMIN — SODIUM CHLORIDE, POTASSIUM CHLORIDE, SODIUM LACTATE AND CALCIUM CHLORIDE 1000 ML: 600; 310; 30; 20 INJECTION, SOLUTION INTRAVENOUS at 10:43

## 2024-01-22 RX ADMIN — HYDROMORPHONE HYDROCHLORIDE 1 MG: 1 INJECTION, SOLUTION INTRAMUSCULAR; INTRAVENOUS; SUBCUTANEOUS at 12:48

## 2024-01-22 RX ADMIN — HYDROMORPHONE HYDROCHLORIDE 1 MG: 1 INJECTION, SOLUTION INTRAMUSCULAR; INTRAVENOUS; SUBCUTANEOUS at 10:52

## 2024-01-22 RX ADMIN — Medication 5 ML: at 13:34

## 2024-01-22 RX ADMIN — ONDANSETRON 8 MG: 2 INJECTION INTRAMUSCULAR; INTRAVENOUS at 10:47

## 2024-01-22 RX ADMIN — DIPHENHYDRAMINE HYDROCHLORIDE 25 MG: 25 CAPSULE ORAL at 11:07

## 2024-01-22 RX ADMIN — KETOROLAC TROMETHAMINE 30 MG: 30 INJECTION, SOLUTION INTRAMUSCULAR at 10:56

## 2024-01-22 RX ADMIN — HYDROMORPHONE HYDROCHLORIDE 1 MG: 1 INJECTION, SOLUTION INTRAMUSCULAR; INTRAVENOUS; SUBCUTANEOUS at 11:47

## 2024-01-22 NOTE — PROGRESS NOTES
Social Work - Transportation  [facility]    Data/Intervention:  Patient Name: Jennifer Cervantes Goes By: Jennifer    /Age: 2000 (23 year old)    Referral From: [facility]  Reason for Referral:  support requested for patient transportation needs for today's appointment.  Assessment:  called [employer] to arrange ride through patient's insurance. [employer] arranged  for patient from home with [employer]. Patient will need to call [phone] when ready for return ride home.  Plan: Patient is aware of the transportation plan.  available to assist with any other needs.    CARLOS Chavez,LGUDAY  Hematology/Oncology Social Worker  Phone:[phone] Pager: [phone]

## 2024-01-22 NOTE — PROGRESS NOTES
Infusion Nursing Note:  Jennifer Cervantes presents today for IVF and pain medication .    Patient seen by provider today: Yes: Patricia Mantilla NP    Note: Pt presented to clinic w/ c/o pain in LUE, aching, 9/10.  Pt took PRN oxy this morning without any relief.  Pt declines pain intervention aside from IVF and pain medication per therapy plan.  Pain goal is 5/10 today, which was reached at by time of discharge.  Pt states she has been having random vomiting for approximately one month.  This happens with empty stomach as well as after eating.  Has tried PRN PO Zofran without relief.  Pt would like to speak with ANDREAS.  NP to chair to discuss with pt.    Intravenous Access:  Implanted Port.    Treatment Conditions:  Not Applicable.    Post Infusion Assessment:  Patient tolerated infusion without incident.  Blood return noted pre and post infusion.  Site patent and intact, free from redness, edema or discomfort.  No evidence of extravasations.  Access discontinued per protocol.    Discharge Plan:   Patient declined prescription refills.  Discharge instructions reviewed with: Patient.  Patient and/or family verbalized understanding of discharge instructions and all questions answered.  AVS to patient via [employer].  Patient will return 1/26/2024 for next appointment.   Patient discharged in stable condition accompanied by: self.  Departure Mode: Ambulatory.    Lupe Bolton RN

## 2024-01-22 NOTE — TELEPHONE ENCOUNTER
Oncology Nurse Triage - Sickle Cell Pain Crisis:  Situation: Jennifer  calling about Sickle Cell Pain Crisis, requesting to be added on for IV fluids and pain medicine    Background:   Patient's last infusion was 1/18/24  Last clinic visit date: 12/28/23  Does patient have active treatment plan?  Yes    Assessment of Symptoms:  Onset/Duration of symptoms: 2 day    Is it typical sickle cell pain? Yes   Location: Left arm, leg, side of body  Character: Sharp           Intensity: 10/10    Any radiation of pain, numbness, tingling, weakness, warmth, swelling, discoloration of arms or legs?Yes pain only    Fever?No    Chest Pain Present: No     Shortness of breath: No     Other home therapies tried: HEAT/HEATING PAD     Last home medication taken and when: 0600 Oxycodone, methocarbamol    Any Refills Needed?: No     Does patient have transportation & length of time to get to clinic: No needs transportation set up    Recommendations:   Added to infusion wait list.   Call to pt to offer 11am infusion appt in [facility]. Pt accepting of time.   [id number] message sent to scheduling and SW to assist w/ transportation w/ request they call pt to confirm when set up.     If you do not hear from the infusion center by 2pm then you will not be able to get in for an infusion today. If symptoms worsen while waiting for call back, and/or you experience fever, chills, SOB, chest pain, cough, n/v, dizziness, numbness, swelling, discoloration of extremities, then seek emergency evaluation in Emergency Department.     Please note, if you are late for your appt, you risk losing your infusion appt as it may delay another patient's infusion who arrived on time.

## 2024-01-22 NOTE — PATIENT INSTRUCTIONS
Contact Numbers    [facility] Main Line/TRIAGE: [phone]    Call with chills and/or temperature greater than or equal to 100.5, uncontrolled nausea/vomiting, diarrhea, constipation, dizziness, shortness of breath, chest pain, bleeding, unexplained bruising, or any new/concerning symptoms, questions/concerns.     If you are having any concerning symptoms or wish to speak to a provider before your next infusion visit, please call your care coordinator or triage to notify them so we can adequately serve you.       After Hours: [phone]    If after hours, weekends, or holidays, call main hospital  and ask for Oncology doctor on call.      January 2024 Sunday Monday Tuesday Wednesday Thursday Friday Saturday        1     2    BMT INFUSION 3 HR (180 MIN)  10:00 AM   (180 min.)    BMT INFUSION NURSE   [facility] 3     4    NEUROTOXIN   9:45 AM   (60 min.)   Katherine Pierce MD   [facility]    BMT INFUSION 3 HR (180 MIN)  11:30 AM   (180 min.)    BMT INFUSION NURSE   [facility] [place] 5     6    Admission   3:02 AM   Andrew Chou MD   [facility] Emergency Department   (Discharge: 1/6/2024)   7     8    BMT INFUSION 2 HR (120 MIN)  12:00 PM   (120 min.)    BMT INFUSION NURSE   [facility] [place] 9     10    BMT INFUSION 2 HR (120 MIN)   1:30 PM   (120 min.)    BMT INFUSION NURSE   [facility] [place] 11     12    Admission   8:54 AM   Suraj Pino MD   [facility] Emergency Department   (Discharge: 1/12/2024) 13       14     15    SPEC INFUSION 3 HR (180 MIN)   9:00 AM   (180 min.)    SIPC INFUSION NURSE   [facility] [place] 16     17     18    BMT INFUSION 3 HR (180 MIN)  10:00 AM   (180 min.)     BMT INFUSION NURSE   [facility] [place] 19     20       21     22    ONC INFUSION 3 HR (180 MIN)  11:00 AM   (180 min.)    ONC INFUSION NURSE   [facility] 23     24     25     26    LAB CENTRAL  11:00 AM   (15 min.)   [facility]   [facility]    ONC INFUSION 4 HR (240 MIN)  11:30 AM   (240 min.)    ONC INFUSION NURSE   [facility] 27       28     29 30 31 February 2024 Sunday Monday Tuesday Wednesday Thursday Friday Saturday                       1     2     3       4     5     6     7    ADULT HEARING EVALUATION   7:00 AM   (60 min.)   Yashira Lewis AuD   [facility] 8     9    RETURN ASTHMA  12:45 PM   (40 min.)   Jake Harvey MD   [facility] [place] 10       11     12     13     14     15     16     17       18     19     20     21     22     23    LAB CENTRAL  10:00 AM   (15 min.)   [facility]   [facility]    RETURN ACTIVE TREATMENT  10:15 AM   (45 min.)   Patricia Mantilla CNP   [facility]    ONC INFUSION 4 HR (240 MIN)  11:30 AM   (240 min.)    ONC INFUSION NURSE   [facility] 24       25     26     27     28     29                               Lab Results:  No results found for this or any previous visit (from the past 12 hour(s)).

## 2024-01-22 NOTE — LETTER
1/22/2024         RE: Jennifer Cervantes  [address]  [facility] [address]        Dear Colleague,    Thank you for referring your patient, Jennifer Cervantes, to the [facility]. Please see a copy of my visit note below.    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 She had multiple thromboembolic events in 2021 despite adherent anticoagulation use (though warfarin was perpetually low) and there are concerns for chronic thromboembolic disease but did not have pulmonary HTN on a November 2021 cath. She is maintained on chronic PO opioids and twice-weekly infusion visits (since 1/24/22) but has been able to be maintained on this regimen and has stayed out of the ED most of the time with even rarer admissions.     Interval History:  Jennifer is seen today for an interim visit due to concerns for nausea and vomiting. She states she has struggled with intermittent nausea for many years which is what led GI workup and eventually a cholecystectomy. When at home she states she feels suddenly ill with nausea and severe fatigue on a regular basis. She now has been vomiting intermittently. Eating does not exacerbate or improve her symptoms. She vomits only liquids at times but more solid contents after eating. She has persistent aching pain in her abdomen which is not worse with the nausea or vomiting episodes. She recalls specific events, such as recently making dinner at home and having to call her sister in the room to take over  "because she will feel suddenly nauseated. She denies any constipation or diarrhea. Her stools are very regular with soft/formed bowel movements twice a day. Denies fever/chills. She feels that her abdomen becomes intermittently distended and bloated.      Sickle Cell Disease Comprehensive Checklist  Bone Health/Avascular Necrosis Screening/Symptoms (each visit): no new concerns today  Leg Ulcer evaluation (every visit): none  Hypertension (every visit):stable 11/3/23  Last pulmonary evaluation (asthma, AMAN, pulm HTN): 9/28/22, due for follow-up  Stroke/silent cerebral infarct Hx (Y/N): Yes TIA ~2014, first event ~age 1 with full stroke and R sided weakness  Last PCP Visit: 3/6/23  Vaccines:  PCV13: 5/13/19  Pneumovax (PPSV23): 3/04, 10/09, 7/12/19 (next due 7/2024)  Menactra: 4/2010, 9/2015 (MCV done 8/16/21)  MenB: 9/16/15, 5/13/19  Influenza: 10/2/23  Audiology (chelation): done 6/2020, normal.. However, on 6/7, \"While there is no significant change in grade on the CTCAE 4.03 Scale, there were hearing changes bilaterally for both standard and extended high frequency audiometry.  Will need to determine if an ENT consult is advised due to the asymmetry in extended high frequency testing.\"     Plan last reviewed with patient: 10/2/23    Patient background: 24 yo F, enjoys movies and kids though there are times where she does not really want to talk to people. Does not have a lot of social support at home.     Sickle Cell Disease History  Primary Hematologist Team: Jose Rafael Duncan  PCP: none  Genotype: SS  Acute Pain Crisis Treatment: (limiting IV   ER   Dilaudid 1 mg IV q1h up to 3 doses  Toradol 30 mg IV x1   Maintenance IV fluids with LR  Other: Zofran 8 mg IV PRN nausea  Inpatient:  PCA Dilaudid 1 hr q30 minutes, no basal rate  Toradol 30 mg x6h x 4 hr  LR maintenance x 1-2 days  Other Medications: Zofran  ASA  Supportive Care: Docusate, Senna  Chronic Pain Medications:    Home regimen: oxycodone 15 mg p.o. q.4-6 " hours p.r.n. breakthrough pain.  She also continues on Voltaren gel, and Zoloft among other medications.    -Also benefits from mental health visits, acupuncture  Baseline Hemoglobin: 7 g/dl without chronic transfusions  Hydroxyurea use: Yes  H/O blood transfusions: Yes, several (iron overload) Most recent 11/20/2021  H/O Transfusion Reactions: no  Antibodies:none  H/O Acute Chest Syndrome: Yes  Last episode:9/05/22 (previously 4/26/21, 10/2019)   ICU/intubation: not with 9/2022 admission  H/O Stroke: Yes (managed with chronic transfusions in the past, stopped late Spring 2020)  H/O VTE: Yes (2/2021)  H/O Cholecystectomy or Splenectomy: no  H/O Asthma, Pulm HTN, AVN, Leg Ulcers, Nephropathy, Retinopathy, etc: Iron overload, asthma, chronic lung disease, physical limitations from early stroke    ---------------------------------------  Jennifer Cervantes's Goals (did not discuss today)    1-3 month goal:  Look for a new job in January 6 month goal:  Save money for ultimate goal of moving out    12 month goal:  Move into her own place     Disease-specific goal(s):  Decrease frequency of ED and infusion center visits.  ---------------------------------------      Current Outpatient Medications   Medication Sig Dispense Refill    acetaminophen (TYLENOL) 325 MG tablet Take 2 tablets (650 mg) by mouth every 6 hours as needed for mild pain 120 tablet 3    albuterol (PROAIR HFA/PROVENTIL HFA/VENTOLIN HFA) 108 (90 Base) MCG/ACT inhaler Inhale 2 puffs into the lungs every 6 hours as needed for shortness of breath or wheezing 8.5 g 3    albuterol (PROVENTIL) (2.5 MG/3ML) 0.083% neb solution Take 2 vials (5 mg) by nebulization every 6 hours as needed for shortness of breath or wheezing 90 mL 3    aspirin (ASA) 81 MG chewable tablet Take 1 tablet (81 mg) by mouth 2 times daily 60 tablet 11    budesonide-formoterol (SYMBICORT) 160-4.5 MCG/ACT Inhaler Inhale 2 puffs into the lungs 2 times daily 10.2 g 3    cetirizine (ZYRTEC) 10 MG  tablet Take 1 tablet (10 mg) by mouth daily 30 tablet 1    deferasirox (JADENU) 360 MG tablet Take 4 tablets (1,440 mg) by mouth every evening 120 tablet 4    diphenhydrAMINE (BENADRYL) 25 MG capsule Take 1 capsule (25 mg) by mouth every 6 hours as needed for itching or allergies 30 capsule 0    EPINEPHrine (ANY BX GENERIC EQUIV) 0.3 MG/0.3ML injection 2-pack Inject 0.3 mLs (0.3 mg) into the muscle as needed for anaphylaxis 1 each 1    Hydroxyurea 1000 MG TABS Take 3,000 mg by mouth daily 90 tablet 3    methocarbamol (ROBAXIN) 750 MG tablet Take 1 tablet (750 mg) by mouth 4 times daily as needed for muscle spasms (during sickle pain crises. Okay to take scheduled while in pain) 60 tablet 1    naloxone (NARCAN) 4 MG/0.1ML nasal spray Spray 4 mg into one nostril alternating nostrils as needed for opioid reversal every 2-3 minutes until assistance arrives      ondansetron (ZOFRAN) 8 MG tablet Take 1 tablet (8 mg) by mouth every 8 hours as needed for nausea 30 tablet 1    oxyCODONE IR (ROXICODONE) 10 MG tablet Take 1 tablet (10 mg) by mouth every 4 hours as needed for severe pain or breakthrough pain Goal 4 per day. Max 6 per day. 20 tablet 0       Past Medical History  Past Medical History:   Diagnosis Date    Anxiety     Bleeding disorder (H24)     Blood clotting disorder (H24)     Cerebral infarction (H) 2015    Cognitive developmental delay     low IQ. 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 side (H)     right hand contractures    Iron overload due to repeated red blood cell transfusions     Migraines     Multiple subsegmental pulmonary emboli without acute cor pulmonale (H) 02/01/2021    Oppositional defiant behavior     Presence of intrauterine contraceptive device 2/18/2020    Superficial venous thrombosis of arm, right 03/25/2021    Uncomplicated asthma      Past Surgical History:    Procedure Laterality Date    AS INSERT TUNNELED CV 2 CATH W/O PORT/PUMP      CHOLECYSTECTOMY      CV RIGHT HEART CATH MEASUREMENTS RECORDED N/A 11/18/2021    Procedure: Right Heart Cath;  Surgeon: Jackson Stauffer MD;  Location:  HEART CARDIAC CATH LAB    INSERT PORT VASCULAR ACCESS Left 4/21/2021    Procedure: INSERTION, VASCULAR ACCESS PORT (NOT SURE ON SIDE UNTIL REMOVAL);  Surgeon: Rajan More MD;  Location: [facility] OR    IR CHEST PORT PLACEMENT > 5 YRS OF AGE  4/21/2021    IR CVC NON TUNNEL LINE REMOVAL  5/6/2021    IR CVC NON TUNNEL PLACEMENT > 5 YRS  04/07/2020    IR CVC NON TUNNEL PLACEMENT > 5 YRS  4/30/2021    IR CVC NON TUNNEL PLACEMENT > 5 YRS  9/7/2022    IR PORT REMOVAL LEFT  4/21/2021    REMOVE PORT VASCULAR ACCESS Left 4/21/2021    Procedure: REMOVAL, VASCULAR ACCESS PORT LEFT;  Surgeon: Rajan More MD;  Location: [facility] OR    REPAIR TENDON ELBOW Right 10/02/2019    Procedure: Right Elbow Flexor Lengthening, Flexor Pronator Slide Of Wrist and Finger, Thumb Adductor Lengthening;  Surgeon: Anai Franco MD;  Location: [facility] OR    TONSILLECTOMY Bilateral 10/02/2019    Procedure: Bilateral Tonsillectomy;  Surgeon: Farhana Guy MD;  Location: [facility] OR    ZZC BREAST REDUCTION (INCLUDES LIPO) TIER 3 Bilateral 04/23/2019     Allergies   Allergen Reactions    Contrast Dye      Hives and breathing issues    Fish-Derived Products Hives    Seafood Hives    Adhesive Tape Hives     Primipore dressing causes hives    Gadolinium     Iodinated Contrast Media      Social History   Social History     Tobacco Use    Smoking status: Never     Passive exposure: Never    Smokeless tobacco: Never   Substance Use Topics    Alcohol use: Not Currently     Alcohol/week: 0.0 standard drinks of alcohol    Drug use: Never    Still living at home with mom and extended family.   Past medical history and social history were reviewed.    Physical Examination:  There were no vitals taken for this visit.     Wt  Readings from Last 10 Encounters:   01/12/24 66.2 kg (146 lb)   01/06/24 67.6 kg (149 lb)   01/04/24 67.9 kg (149 lb 12.8 oz)   12/28/23 68 kg (149 lb 14.4 oz)   12/22/23 68 kg (150 lb)   12/20/23 67 kg (147 lb 11.2 oz)   12/08/23 68 kg (150 lb)   12/01/23 66.9 kg (147 lb 6.4 oz)   11/29/23 67.1 kg (148 lb)   11/27/23 66.5 kg (146 lb 9.6 oz)     General: Pleasant female, NAD  Eyes: EOMI, PERRL. Mild. scleral icterus.  Respiratory:Normal respiratory effort  GI:   Ext: No peripheral edema.  MSK: Contractured right arm and hand 2/2 stoke.  Neurologic: Grossly nonfocal. A/O x 4.  Skin: No rashes, petechiae, or bruising noted on exposed skin. Port accessed in left chest    Laboratory Data:  Most Recent 3 CBC's:  Recent Labs   Lab Test 01/12/24  1222 01/06/24  0353 12/31/23  0239   WBC 13.8* 11.4* 14.3*   HGB 7.6* 7.1* 7.4*   MCV 85 86 85   * 430 424   ANEUTAUTO 8.6* 7.6 9.6*    Most Recent 3 BMP's:  Recent Labs   Lab Test 01/12/24  1222 01/06/24  0353 12/28/23  1111 12/16/23  0301 12/08/23  0246 12/03/23  0550 11/29/23  2343 11/29/23  2159    138 139   < > 138 139   < > 146*   POTASSIUM 4.4 3.8 3.8   < > 3.7 4.4   < > 2.3*   CHLORIDE 104 103 107   < > 105 104   < > 122*   CO2 24 25 22   < > 25 25   < > 16*   BUN 8.3 9.5 9.8   < > 9.9 12.4   < > 4.5*   CR 0.56 0.50* 0.52   < > 0.47* 0.58   < > 0.29*   ANIONGAP 10 10 10   < > 8 10   < > 8   MICAH 9.3 8.9 9.0   < > 8.6 8.9   < > 4.9*   GLC 95 83 97   < > 100* 91   < > 60*   PROTTOTAL  --   --   --   --  7.1 7.4  --  4.3*   ALBUMIN  --   --   --   --  4.5 4.5  --  2.5*    < > = values in this interval not displayed.    Most Recent 2 LFT's:  Recent Labs   Lab Test 12/08/23  0246 12/03/23  0550   AST 49* 35   ALT 23 21   ALKPHOS 65 67   BILITOTAL 2.5* 2.7*    Most Recent TSH and T4:No lab results found.  Phos/Mag:  Lab Results   Component Value Date    PHOS 4.8 (H) 05/13/2023    PHOS 5.0 (H) 05/12/2023    PHOS 3.6 02/21/2021    MAG 2.0 11/11/2021    MAG 1.7  02/21/2021    MAG 2.1 02/02/2021        I reviewed the above labs today.     Assessment and Plan:  1. Sickle Cell HgbSS Disease  2. Abdominal pain  3. Abdominal distention  4. Nausea/vomiting   Increase in episodes of vomiting associated with nausea, fatigue and abdominal bloating over the past 1+ month. She has history of cholecystectomy, unclear of when this was but she believes it was [facility] adult clinic at [facility]. Symptoms are similar to her initial presentation prior to surgery from what she recalls but based on her concerns seem to be rather acute-on-chronic in nature. I am less concerned for infectious etiology given the length of time she has been experiencing symptoms, lack of fever or systemic symptoms. No concerning elevation in LFT or bilirubin recently but with increase in vomiting episodes and distention will repeat labs this week including lipase and H. Pylori antigen. Of course bilirubin is rather inconclusive due to chronic elevation with hemolysis s/t SCD. Gastroparesis can be seen in patients with sickle cell disease but would be unusual to present acutely. Due due longstanding history of NSAID and aspirin use, peptic ulcer disease is also on the differential. She is not using antacid medications or PPI which could be considered for symptomatic treatment but will wait to consider this until H. Pylori testing is complete.   -Check CBC with diff, CMP, lipase, H. pylori stool antigen    42 minutes spent on the date of the encounter doing chart review, review of test results, interpretation of tests, patient visit, and documentation     Patricia Mantilla, JOCELYN

## 2024-01-22 NOTE — PROGRESS NOTES
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 She had multiple thromboembolic events in 2021 despite adherent anticoagulation use (though warfarin was perpetually low) and there are concerns for chronic thromboembolic disease but did not have pulmonary HTN on a November 2021 cath. She is maintained on chronic PO opioids and twice-weekly infusion visits (since 1/24/22) but has been able to be maintained on this regimen and has stayed out of the ED most of the time with even rarer admissions.     Interval History:  Jennifer is seen today for an interim visit due to concerns for nausea and vomiting. She states she has struggled with intermittent nausea for many years which is what led GI workup and eventually a cholecystectomy. When at home she states she feels suddenly ill with nausea and severe fatigue on a regular basis. She now has been vomiting intermittently. Eating does not exacerbate or improve her symptoms. She vomits only liquids at times but more solid contents after eating. She has persistent aching pain in her abdomen which is not worse with the nausea or vomiting episodes. She recalls specific events, such as recently making dinner at home and having to call her sister in the room to take over because she will feel suddenly nauseated. She denies any constipation or diarrhea. Her stools are very regular with soft/formed bowel movements twice a day. Denies fever/chills. She feels that her abdomen becomes intermittently distended and  "bloated.      Sickle Cell Disease Comprehensive Checklist  Bone Health/Avascular Necrosis Screening/Symptoms (each visit): no new concerns today  Leg Ulcer evaluation (every visit): none  Hypertension (every visit):stable 11/3/23  Last pulmonary evaluation (asthma, AMAN, pulm HTN): 9/28/22, due for follow-up  Stroke/silent cerebral infarct Hx (Y/N): Yes TIA ~2014, first event ~age 1 with full stroke and R sided weakness  Last PCP Visit: 3/6/23  Vaccines:  PCV13: 5/13/19  Pneumovax (PPSV23): 3/04, 10/09, 7/12/19 (next due 7/2024)  Menactra: 4/2010, 9/2015 (MCV done 8/16/21)  MenB: 9/16/15, 5/13/19  Influenza: 10/2/23  Audiology (chelation): done 6/2020, normal.. However, on 6/7, \"While there is no significant change in grade on the CTCAE 4.03 Scale, there were hearing changes bilaterally for both standard and extended high frequency audiometry.  Will need to determine if an ENT consult is advised due to the asymmetry in extended high frequency testing.\"     Plan last reviewed with patient: 10/2/23    Patient background: 22 yo F, enjoys movies and kids though there are times where she does not really want to talk to people. Does not have a lot of social support at home.     Sickle Cell Disease History  Primary Hematologist Team: Jose Rafael Duncan  PCP: none  Genotype: SS  Acute Pain Crisis Treatment: (limiting IV   ER   Dilaudid 1 mg IV q1h up to 3 doses  Toradol 30 mg IV x1   Maintenance IV fluids with LR  Other: Zofran 8 mg IV PRN nausea  Inpatient:  PCA Dilaudid 1 hr q30 minutes, no basal rate  Toradol 30 mg x6h x 4 hr  LR maintenance x 1-2 days  Other Medications: Zofran  ASA  Supportive Care: Docusate, Senna  Chronic Pain Medications:    Home regimen: oxycodone 15 mg p.o. q.4-6 hours p.r.n. breakthrough pain.  She also continues on Voltaren gel, and Zoloft among other medications.    -Also benefits from mental health visits, acupuncture  Baseline Hemoglobin: 7 g/dl without chronic transfusions  Hydroxyurea use: " Yes  H/O blood transfusions: Yes, several (iron overload) Most recent 11/20/2021  H/O Transfusion Reactions: no  Antibodies:none  H/O Acute Chest Syndrome: Yes  Last episode:9/05/22 (previously 4/26/21, 10/2019)   ICU/intubation: not with 9/2022 admission  H/O Stroke: Yes (managed with chronic transfusions in the past, stopped late Spring 2020)  H/O VTE: Yes (2/2021)  H/O Cholecystectomy or Splenectomy: no  H/O Asthma, Pulm HTN, AVN, Leg Ulcers, Nephropathy, Retinopathy, etc: Iron overload, asthma, chronic lung disease, physical limitations from early stroke    ---------------------------------------  Jennifer Cervantes's Goals (did not discuss today)    1-3 month goal:  Look for a new job in January 6 month goal:  Save money for ultimate goal of moving out    12 month goal:  Move into her own place     Disease-specific goal(s):  Decrease frequency of ED and infusion center visits.  ---------------------------------------      Current Outpatient Medications   Medication Sig Dispense Refill    acetaminophen (TYLENOL) 325 MG tablet Take 2 tablets (650 mg) by mouth every 6 hours as needed for mild pain 120 tablet 3    albuterol (PROAIR HFA/PROVENTIL HFA/VENTOLIN HFA) 108 (90 Base) MCG/ACT inhaler Inhale 2 puffs into the lungs every 6 hours as needed for shortness of breath or wheezing 8.5 g 3    albuterol (PROVENTIL) (2.5 MG/3ML) 0.083% neb solution Take 2 vials (5 mg) by nebulization every 6 hours as needed for shortness of breath or wheezing 90 mL 3    aspirin (ASA) 81 MG chewable tablet Take 1 tablet (81 mg) by mouth 2 times daily 60 tablet 11    budesonide-formoterol (SYMBICORT) 160-4.5 MCG/ACT Inhaler Inhale 2 puffs into the lungs 2 times daily 10.2 g 3    cetirizine (ZYRTEC) 10 MG tablet Take 1 tablet (10 mg) by mouth daily 30 tablet 1    deferasirox (JADENU) 360 MG tablet Take 4 tablets (1,440 mg) by mouth every evening 120 tablet 4    diphenhydrAMINE (BENADRYL) 25 MG capsule Take 1 capsule (25 mg) by mouth every  6 hours as needed for itching or allergies 30 capsule 0    EPINEPHrine (ANY BX GENERIC EQUIV) 0.3 MG/0.3ML injection 2-pack Inject 0.3 mLs (0.3 mg) into the muscle as needed for anaphylaxis 1 each 1    Hydroxyurea 1000 MG TABS Take 3,000 mg by mouth daily 90 tablet 3    methocarbamol (ROBAXIN) 750 MG tablet Take 1 tablet (750 mg) by mouth 4 times daily as needed for muscle spasms (during sickle pain crises. Okay to take scheduled while in pain) 60 tablet 1    naloxone (NARCAN) 4 MG/0.1ML nasal spray Spray 4 mg into one nostril alternating nostrils as needed for opioid reversal every 2-3 minutes until assistance arrives      ondansetron (ZOFRAN) 8 MG tablet Take 1 tablet (8 mg) by mouth every 8 hours as needed for nausea 30 tablet 1    oxyCODONE IR (ROXICODONE) 10 MG tablet Take 1 tablet (10 mg) by mouth every 4 hours as needed for severe pain or breakthrough pain Goal 4 per day. Max 6 per day. 20 tablet 0       Past Medical History  Past Medical History:   Diagnosis Date    Anxiety     Bleeding disorder (H24)     Blood clotting disorder (H24)     Cerebral infarction (H) 2015    Cognitive developmental delay     low IQ. 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 side (H)     right hand contractures    Iron overload due to repeated red blood cell transfusions     Migraines     Multiple subsegmental pulmonary emboli without acute cor pulmonale (H) 02/01/2021    Oppositional defiant behavior     Presence of intrauterine contraceptive device 2/18/2020    Superficial venous thrombosis of arm, right 03/25/2021    Uncomplicated asthma      Past Surgical History:   Procedure Laterality Date    AS INSERT TUNNELED CV 2 CATH W/O PORT/PUMP      CHOLECYSTECTOMY      CV RIGHT HEART CATH MEASUREMENTS RECORDED N/A 11/18/2021    Procedure: Right Heart Cath;  Surgeon: Jackson Stauffer MD;  [facility]: [facility]  CARDIAC CATH LAB    INSERT PORT VASCULAR ACCESS Left 4/21/2021    Procedure: INSERTION, VASCULAR ACCESS PORT (NOT SURE ON SIDE UNTIL REMOVAL);  Surgeon: Rajan More MD;  Location: [facility] OR    IR CHEST PORT PLACEMENT > 5 YRS OF AGE  4/21/2021    IR CVC NON TUNNEL LINE REMOVAL  5/6/2021    IR CVC NON TUNNEL PLACEMENT > 5 YRS  04/07/2020    IR CVC NON TUNNEL PLACEMENT > 5 YRS  4/30/2021    IR CVC NON TUNNEL PLACEMENT > 5 YRS  9/7/2022    IR PORT REMOVAL LEFT  4/21/2021    REMOVE PORT VASCULAR ACCESS Left 4/21/2021    Procedure: REMOVAL, VASCULAR ACCESS PORT LEFT;  Surgeon: Rajan More MD;  Location: [facility] OR    REPAIR TENDON ELBOW Right 10/02/2019    Procedure: Right Elbow Flexor Lengthening, Flexor Pronator Slide Of Wrist and Finger, Thumb Adductor Lengthening;  Surgeon: Anai Franco MD;  Location: [facility] OR    TONSILLECTOMY Bilateral 10/02/2019    Procedure: Bilateral Tonsillectomy;  Surgeon: Farhana Guy MD;  Location: [facility] OR    ZZC BREAST REDUCTION (INCLUDES LIPO) TIER 3 Bilateral 04/23/2019     Allergies   Allergen Reactions    Contrast Dye      Hives and breathing issues    Fish-Derived Products Hives    Seafood Hives    Adhesive Tape Hives     Primipore dressing causes hives    Gadolinium     Iodinated Contrast Media      Social History   Social History     Tobacco Use    Smoking status: Never     Passive exposure: Never    Smokeless tobacco: Never   Substance Use Topics    Alcohol use: Not Currently     Alcohol/week: 0.0 standard drinks of alcohol    Drug use: Never    Still living at home with mom and extended family.   Past medical history and social history were reviewed.    Physical Examination:  There were no vitals taken for this visit.     Wt Readings from Last 10 Encounters:   01/12/24 66.2 kg (146 lb)   01/06/24 67.6 kg (149 lb)   01/04/24 67.9 kg (149 lb 12.8 oz)   12/28/23 68 kg (149 lb 14.4 oz)   12/22/23 68 kg (150 lb)   12/20/23 67 kg (147 lb 11.2 oz)   12/08/23 68 kg (150  lb)   12/01/23 66.9 kg (147 lb 6.4 oz)   11/29/23 67.1 kg (148 lb)   11/27/23 66.5 kg (146 lb 9.6 oz)     General: Pleasant female, NAD  Eyes: EOMI, PERRL. Mild. scleral icterus.  Respiratory:Normal respiratory effort  GI:   Ext: No peripheral edema.  MSK: Contractured right arm and hand 2/2 stoke.  Neurologic: Grossly nonfocal. A/O x 4.  Skin: No rashes, petechiae, or bruising noted on exposed skin. Port accessed in left chest    Laboratory Data:  Most Recent 3 CBC's:  Recent Labs   Lab Test 01/12/24  1222 01/06/24  0353 12/31/23  0239   WBC 13.8* 11.4* 14.3*   HGB 7.6* 7.1* 7.4*   MCV 85 86 85   * 430 424   ANEUTAUTO 8.6* 7.6 9.6*    Most Recent 3 BMP's:  Recent Labs   Lab Test 01/12/24  1222 01/06/24  0353 12/28/23  1111 12/16/23  0301 12/08/23  0246 12/03/23  0550 11/29/23  2343 11/29/23  2159    138 139   < > 138 139   < > 146*   POTASSIUM 4.4 3.8 3.8   < > 3.7 4.4   < > 2.3*   CHLORIDE 104 103 107   < > 105 104   < > 122*   CO2 24 25 22   < > 25 25   < > 16*   BUN 8.3 9.5 9.8   < > 9.9 12.4   < > 4.5*   CR 0.56 0.50* 0.52   < > 0.47* 0.58   < > 0.29*   ANIONGAP 10 10 10   < > 8 10   < > 8   MICAH 9.3 8.9 9.0   < > 8.6 8.9   < > 4.9*   GLC 95 83 97   < > 100* 91   < > 60*   PROTTOTAL  --   --   --   --  7.1 7.4  --  4.3*   ALBUMIN  --   --   --   --  4.5 4.5  --  2.5*    < > = values in this interval not displayed.    Most Recent 2 LFT's:  Recent Labs   Lab Test 12/08/23  0246 12/03/23  0550   AST 49* 35   ALT 23 21   ALKPHOS 65 67   BILITOTAL 2.5* 2.7*    Most Recent TSH and T4:No lab results found.  Phos/Mag:  Lab Results   Component Value Date    PHOS 4.8 (H) 05/13/2023    PHOS 5.0 (H) 05/12/2023    PHOS 3.6 02/21/2021    MAG 2.0 11/11/2021    MAG 1.7 02/21/2021    MAG 2.1 02/02/2021        I reviewed the above labs today.     Assessment and Plan:  1. Sickle Cell HgbSS Disease  2. Abdominal pain  3. Abdominal distention  4. Nausea/vomiting   Increase in episodes of vomiting associated with nausea,  fatigue and abdominal bloating over the past 1+ month. She has history of cholecystectomy, unclear of when this was but she believes it was when [facility] to transfer to the adult clinic at [facility]. Symptoms are similar to her initial presentation prior to surgery from what she recalls but based on her concerns seem to be rather acute-on-chronic in nature. I am less concerned for infectious etiology given the length of time she has been experiencing symptoms, lack of fever or systemic symptoms. No concerning elevation in LFT or bilirubin recently but with increase in vomiting episodes and distention will repeat labs this week including lipase and H. Pylori antigen. Of course bilirubin is rather inconclusive due to chronic elevation with hemolysis s/t SCD. Gastroparesis can be seen in patients with sickle cell disease but would be unusual to present acutely. Due due longstanding history of NSAID and aspirin use, peptic ulcer disease is also on the differential. She is not using antacid medications or PPI which could be considered for symptomatic treatment but will wait to consider this until H. Pylori testing is complete.   -Check CBC with diff, CMP, lipase, H. pylori stool antigen    42 minutes spent on the date of the encounter doing chart review, review of test results, interpretation of tests, patient visit, and documentation     Patricia Mantilla, JOCELYN

## 2024-01-24 ENCOUNTER — PATIENT OUTREACH (OUTPATIENT)
Dept: CARE COORDINATION | Facility: CLINIC | Age: 24
End: 2024-01-24
Payer: COMMERCIAL

## 2024-01-24 ENCOUNTER — INFUSION THERAPY VISIT (OUTPATIENT)
Dept: TRANSPLANT | Facility: CLINIC | Age: 24
End: 2024-01-24
Attending: PEDIATRICS
Payer: COMMERCIAL

## 2024-01-24 ENCOUNTER — NURSE TRIAGE (OUTPATIENT)
Dept: ONCOLOGY | Facility: CLINIC | Age: 24
End: 2024-01-24
Payer: COMMERCIAL

## 2024-01-24 VITALS
OXYGEN SATURATION: 92 % | TEMPERATURE: 98.4 F | SYSTOLIC BLOOD PRESSURE: 133 MMHG | HEART RATE: 106 BPM | DIASTOLIC BLOOD PRESSURE: 82 MMHG | RESPIRATION RATE: 18 BRPM

## 2024-01-24 DIAGNOSIS — G81.10 SPASTIC HEMIPLEGIA, UNSPECIFIED ETIOLOGY, UNSPECIFIED LATERALITY (H): ICD-10-CM

## 2024-01-24 DIAGNOSIS — D57.00 SICKLE CELL PAIN CRISIS (H): Primary | ICD-10-CM

## 2024-01-24 DIAGNOSIS — D57.00 SICKLE CELL PAIN CRISIS (H): ICD-10-CM

## 2024-01-24 PROCEDURE — 258N000003 HC RX IP 258 OP 636: Performed by: PEDIATRICS

## 2024-01-24 PROCEDURE — 96375 TX/PRO/DX INJ NEW DRUG ADDON: CPT

## 2024-01-24 PROCEDURE — 96374 THER/PROPH/DIAG INJ IV PUSH: CPT

## 2024-01-24 PROCEDURE — 96361 HYDRATE IV INFUSION ADD-ON: CPT

## 2024-01-24 PROCEDURE — 250N000011 HC RX IP 250 OP 636: Performed by: PEDIATRICS

## 2024-01-24 PROCEDURE — 250N000013 HC RX MED GY IP 250 OP 250 PS 637: Performed by: PEDIATRICS

## 2024-01-24 PROCEDURE — 96376 TX/PRO/DX INJ SAME DRUG ADON: CPT

## 2024-01-24 RX ORDER — HEPARIN SODIUM (PORCINE) LOCK FLUSH IV SOLN 100 UNIT/ML 100 UNIT/ML
5 SOLUTION INTRAVENOUS
Status: CANCELLED | OUTPATIENT
Start: 2024-04-01

## 2024-01-24 RX ORDER — KETOROLAC TROMETHAMINE 30 MG/ML
30 INJECTION, SOLUTION INTRAMUSCULAR; INTRAVENOUS ONCE
Status: COMPLETED | OUTPATIENT
Start: 2024-01-24 | End: 2024-01-24

## 2024-01-24 RX ORDER — KETOROLAC TROMETHAMINE 30 MG/ML
30 INJECTION, SOLUTION INTRAMUSCULAR; INTRAVENOUS ONCE
Status: CANCELLED
Start: 2024-04-01 | End: 2024-04-01

## 2024-01-24 RX ORDER — ONDANSETRON 2 MG/ML
8 INJECTION INTRAMUSCULAR; INTRAVENOUS EVERY 6 HOURS PRN
Status: DISCONTINUED | OUTPATIENT
Start: 2024-01-24 | End: 2024-01-24 | Stop reason: HOSPADM

## 2024-01-24 RX ORDER — ONDANSETRON 2 MG/ML
8 INJECTION INTRAMUSCULAR; INTRAVENOUS EVERY 6 HOURS PRN
Status: CANCELLED
Start: 2024-04-01

## 2024-01-24 RX ORDER — ONDANSETRON 4 MG/1
8 TABLET, FILM COATED ORAL
Status: CANCELLED
Start: 2024-04-01

## 2024-01-24 RX ORDER — DIPHENHYDRAMINE HCL 25 MG
25 CAPSULE ORAL
Status: CANCELLED
Start: 2024-04-01

## 2024-01-24 RX ORDER — OXYCODONE HYDROCHLORIDE 10 MG/1
10 TABLET ORAL EVERY 4 HOURS PRN
Qty: 20 TABLET | Refills: 0 | Status: SHIPPED | OUTPATIENT
Start: 2024-01-26 | End: 2024-02-01

## 2024-01-24 RX ORDER — DIPHENHYDRAMINE HCL 25 MG
25 CAPSULE ORAL
Status: COMPLETED | OUTPATIENT
Start: 2024-01-24 | End: 2024-01-24

## 2024-01-24 RX ORDER — HEPARIN SODIUM,PORCINE 10 UNIT/ML
5 VIAL (ML) INTRAVENOUS
Status: CANCELLED | OUTPATIENT
Start: 2024-04-01

## 2024-01-24 RX ADMIN — ONDANSETRON 8 MG: 2 INJECTION INTRAMUSCULAR; INTRAVENOUS at 13:18

## 2024-01-24 RX ADMIN — HYDROMORPHONE HYDROCHLORIDE 1 MG: 1 INJECTION, SOLUTION INTRAMUSCULAR; INTRAVENOUS; SUBCUTANEOUS at 14:07

## 2024-01-24 RX ADMIN — SODIUM CHLORIDE, POTASSIUM CHLORIDE, SODIUM LACTATE AND CALCIUM CHLORIDE 1000 ML: 600; 310; 30; 20 INJECTION, SOLUTION INTRAVENOUS at 13:06

## 2024-01-24 RX ADMIN — DIPHENHYDRAMINE HYDROCHLORIDE 25 MG: 25 CAPSULE ORAL at 13:06

## 2024-01-24 RX ADMIN — HYDROMORPHONE HYDROCHLORIDE 1 MG: 1 INJECTION, SOLUTION INTRAMUSCULAR; INTRAVENOUS; SUBCUTANEOUS at 13:06

## 2024-01-24 RX ADMIN — HYDROMORPHONE HYDROCHLORIDE 1 MG: 1 INJECTION, SOLUTION INTRAMUSCULAR; INTRAVENOUS; SUBCUTANEOUS at 15:08

## 2024-01-24 RX ADMIN — KETOROLAC TROMETHAMINE 30 MG: 30 INJECTION, SOLUTION INTRAMUSCULAR; INTRAVENOUS at 13:06

## 2024-01-24 ASSESSMENT — PAIN SCALES - GENERAL: PAINLEVEL: EXTREME PAIN (9)

## 2024-01-24 NOTE — TELEPHONE ENCOUNTER
Narcotic Refill Request  Medication(s) requested:   Oxycodone 10mg tab  Person Requesting Refill: Jennifer  What pain is the medication treating:  sickle cell pain  How is the medication being taken?: 1 tab q4h PRN  Does pt have enough for today? Yes- pt hoping to have prescription ready to be picked up on Friday 1/26.  Is pain being adequately controlled on the current regimen?: sometimes   Experiencing any side effects from medication?: no    Date of most recent appointment:  1/22/24  Any No Show Visits: no  Next appointment:   2/23/24  Last fill date and by whom:  1/18/24 by Patricia Mantilla   Reviewed:  no access    Send to provider: Patricia Mantilla

## 2024-01-24 NOTE — TELEPHONE ENCOUNTER
Oncology Nurse Triage - Sickle Cell Pain Crisis:  Situation: Jennifer  calling about Sickle Cell Pain Crisis, requesting to be added on for IV fluids and pain medicine    Background:   Patient's last infusion was 1/22/24  Last clinic visit date:1/22/24 w/ Patricia Mantilla  Does patient have active treatment plan?  Yes    Assessment of Symptoms:  Onset/Duration of symptoms: 2 day    Is it typical sickle cell pain? Yes   Location: all over  Character: Sharp           Intensity: 9/10    Any radiation of pain, numbness, tingling, weakness, warmth, swelling, discoloration of arms or legs?Yes pain only    Fever? No    Chest Pain Present: No     Shortness of breath: No     Other home therapies tried: HEAT/HEATING PAD     Last home medication taken and when: 1000am Oxycodone and methocarbamol    Any Refills Needed?: Yes -requesting refill for Friday 1/26    Does patient have transportation & length of time to get to clinic: No needs transportation set up    Recommendations:   1112 secure chat to Particia Mantilla, who states okay for pt to come in if infusion can take her.   Added to infusion wait list.   Call to pt to offer 1pm infusion appt. Pt accepting. UDAY Fernando, working on setting up transportation.   [phone] message sent to scheduling.     If you do not hear from the infusion center by 2pm then you will not be able to get in for an infusion today. If symptoms worsen while waiting for call back, and/or you experience fever, chills, SOB, chest pain, cough, n/v, dizziness, numbness, swelling, discoloration of extremities, then seek emergency evaluation in Emergency Department.

## 2024-01-24 NOTE — PROGRESS NOTES
Infusion Nursing Note:  Jennifer Cervantes presents today for add-on IVF/pain meds.    Patient seen by provider today: No   present during visit today: Not Applicable.    Note: VSS. Meds and allergies reviewed. Pt reports 9/10 generalized pain but primarily in left arm- this is her typical sickle cell pain and has no associated cardiac or neurovascular symptoms. Otherwise no acute concerns today. Pt received 1L LR at 500 mL/hr, 30 mg Toradol IVP, 8 mg Zofran IVP, 25 mg Benadryl PO, and 1 mg Dilaudid IVP x3 doses (total 3 mg Dilaudid). Pt reported pain to be tolerable at time of discharge.      Intravenous Access:  Implanted Port.    Treatment Conditions:  Not Applicable.      Post Infusion Assessment:  Patient tolerated infusion without incident.  Blood return noted pre and post infusion.  Site patent and intact, free from redness, edema or discomfort.  No evidence of extravasations.  Access discontinued per protocol.       Discharge Plan:   Patient discharged in stable condition accompanied by: self.  Departure Mode: Ambulatory.      Jaida Kelly RN

## 2024-01-24 NOTE — PROGRESS NOTES
Social Work - Transportation  [facility]    Data/Intervention:  Patient Name: Jennifer Cervantes Goes By: Jennifer    /Age: 2000 (23 year old)  Referral From: Ange Abbasi RN   Reason for Referral:  support requested for patient transportation needs for today's appointment.  Assessment:  called [employer] to arrange ride through patient's insurance. [employer] arranged  for patient from home with [employer]. Patient will need to call [phone] when ready for return ride home.  Plan: Patient is aware of the transportation plan.  available to assist with any other needs.    CARLOS Chavez,LGUDAY  Hematology/Oncology Social Worker  Phone:[phone] Pager: [phone]

## 2024-01-25 RX ORDER — EPINEPHRINE 1 MG/ML
0.3 INJECTION, SOLUTION INTRAMUSCULAR; SUBCUTANEOUS EVERY 5 MIN PRN
Status: CANCELLED | OUTPATIENT
Start: 2024-01-26

## 2024-01-25 RX ORDER — DIPHENHYDRAMINE HYDROCHLORIDE 50 MG/ML
50 INJECTION INTRAMUSCULAR; INTRAVENOUS
Status: CANCELLED
Start: 2024-01-26

## 2024-01-25 RX ORDER — ALBUTEROL SULFATE 90 UG/1
1-2 AEROSOL, METERED RESPIRATORY (INHALATION)
Status: CANCELLED
Start: 2024-01-26

## 2024-01-25 RX ORDER — HEPARIN SODIUM (PORCINE) LOCK FLUSH IV SOLN 100 UNIT/ML 100 UNIT/ML
5 SOLUTION INTRAVENOUS
Status: CANCELLED | OUTPATIENT
Start: 2024-01-26

## 2024-01-25 RX ORDER — ALBUTEROL SULFATE 0.83 MG/ML
2.5 SOLUTION RESPIRATORY (INHALATION)
Status: CANCELLED | OUTPATIENT
Start: 2024-01-26

## 2024-01-25 RX ORDER — METHYLPREDNISOLONE SODIUM SUCCINATE 125 MG/2ML
125 INJECTION, POWDER, LYOPHILIZED, FOR SOLUTION INTRAMUSCULAR; INTRAVENOUS
Status: CANCELLED
Start: 2024-01-26

## 2024-01-25 RX ORDER — HEPARIN SODIUM,PORCINE 10 UNIT/ML
5 VIAL (ML) INTRAVENOUS
Status: CANCELLED | OUTPATIENT
Start: 2024-01-26

## 2024-01-25 RX ORDER — MEPERIDINE HYDROCHLORIDE 25 MG/ML
25 INJECTION INTRAMUSCULAR; INTRAVENOUS; SUBCUTANEOUS EVERY 30 MIN PRN
Status: CANCELLED | OUTPATIENT
Start: 2024-01-26

## 2024-01-26 ENCOUNTER — NURSE TRIAGE (OUTPATIENT)
Dept: ONCOLOGY | Facility: CLINIC | Age: 24
End: 2024-01-26
Payer: COMMERCIAL

## 2024-01-26 ENCOUNTER — INFUSION THERAPY VISIT (OUTPATIENT)
Dept: ONCOLOGY | Facility: CLINIC | Age: 24
End: 2024-01-26
Attending: PEDIATRICS
Payer: COMMERCIAL

## 2024-01-26 ENCOUNTER — APPOINTMENT (OUTPATIENT)
Dept: LAB | Facility: CLINIC | Age: 24
End: 2024-01-26
Attending: PEDIATRICS
Payer: COMMERCIAL

## 2024-01-26 VITALS
OXYGEN SATURATION: 98 % | TEMPERATURE: 98.1 F | HEART RATE: 100 BPM | RESPIRATION RATE: 16 BRPM | SYSTOLIC BLOOD PRESSURE: 124 MMHG | WEIGHT: 145.9 LBS | BODY MASS INDEX: 25.04 KG/M2 | DIASTOLIC BLOOD PRESSURE: 78 MMHG

## 2024-01-26 DIAGNOSIS — R11.2 NAUSEA AND VOMITING, UNSPECIFIED VOMITING TYPE: ICD-10-CM

## 2024-01-26 DIAGNOSIS — R14.0 ABDOMINAL DISTENTION: ICD-10-CM

## 2024-01-26 DIAGNOSIS — E83.111 IRON OVERLOAD DUE TO REPEATED RED BLOOD CELL TRANSFUSIONS: ICD-10-CM

## 2024-01-26 DIAGNOSIS — D57.00 SICKLE CELL PAIN CRISIS (H): Primary | ICD-10-CM

## 2024-01-26 DIAGNOSIS — G81.10 SPASTIC HEMIPLEGIA, UNSPECIFIED ETIOLOGY, UNSPECIFIED LATERALITY (H): ICD-10-CM

## 2024-01-26 DIAGNOSIS — D57.1 HB-SS DISEASE WITHOUT CRISIS (H): ICD-10-CM

## 2024-01-26 LAB
ALBUMIN SERPL BCG-MCNC: 4.6 G/DL (ref 3.5–5.2)
ALP SERPL-CCNC: 63 U/L (ref 40–150)
ALT SERPL W P-5'-P-CCNC: 21 U/L (ref 0–50)
ANION GAP SERPL CALCULATED.3IONS-SCNC: 11 MMOL/L (ref 7–15)
AST SERPL W P-5'-P-CCNC: 49 U/L (ref 0–45)
BASOPHILS # BLD AUTO: 0.3 10E3/UL (ref 0–0.2)
BASOPHILS NFR BLD AUTO: 2 %
BILIRUB SERPL-MCNC: 3.9 MG/DL
BUN SERPL-MCNC: 9.3 MG/DL (ref 6–20)
CALCIUM SERPL-MCNC: 9.2 MG/DL (ref 8.6–10)
CHLORIDE SERPL-SCNC: 104 MMOL/L (ref 98–107)
CREAT SERPL-MCNC: 0.53 MG/DL (ref 0.51–0.95)
DEPRECATED HCO3 PLAS-SCNC: 22 MMOL/L (ref 22–29)
EGFRCR SERPLBLD CKD-EPI 2021: >90 ML/MIN/1.73M2
EOSINOPHIL # BLD AUTO: 0.7 10E3/UL (ref 0–0.7)
EOSINOPHIL NFR BLD AUTO: 5 %
ERYTHROCYTE [DISTWIDTH] IN BLOOD BY AUTOMATED COUNT: 27.5 % (ref 10–15)
FERRITIN SERPL-MCNC: 5234 NG/ML (ref 6–175)
GLUCOSE SERPL-MCNC: 92 MG/DL (ref 70–99)
HCT VFR BLD AUTO: 21.8 % (ref 35–47)
HGB BLD-MCNC: 7.9 G/DL (ref 11.7–15.7)
IMM GRANULOCYTES # BLD: 0.1 10E3/UL
IMM GRANULOCYTES NFR BLD: 1 %
LIPASE SERPL-CCNC: 24 U/L (ref 13–60)
LYMPHOCYTES # BLD AUTO: 1.7 10E3/UL (ref 0.8–5.3)
LYMPHOCYTES NFR BLD AUTO: 12 %
MCH RBC QN AUTO: 31.2 PG (ref 26.5–33)
MCHC RBC AUTO-ENTMCNC: 36.2 G/DL (ref 31.5–36.5)
MCV RBC AUTO: 86 FL (ref 78–100)
MONOCYTES # BLD AUTO: 1.1 10E3/UL (ref 0–1.3)
MONOCYTES NFR BLD AUTO: 7 %
NEUTROPHILS # BLD AUTO: 10.6 10E3/UL (ref 1.6–8.3)
NEUTROPHILS NFR BLD AUTO: 73 %
NRBC # BLD AUTO: 0.6 10E3/UL
NRBC BLD AUTO-RTO: 4 /100
PLATELET # BLD AUTO: 405 10E3/UL (ref 150–450)
POTASSIUM SERPL-SCNC: 3.9 MMOL/L (ref 3.4–5.3)
PROT SERPL-MCNC: 7.8 G/DL (ref 6.4–8.3)
RBC # BLD AUTO: 2.53 10E6/UL (ref 3.8–5.2)
RETICS # AUTO: 0.68 10E6/UL (ref 0.03–0.1)
RETICS/RBC NFR AUTO: 27.8 % (ref 0.5–2)
SODIUM SERPL-SCNC: 137 MMOL/L (ref 135–145)
WBC # BLD AUTO: 14.4 10E3/UL (ref 4–11)

## 2024-01-26 PROCEDURE — 82728 ASSAY OF FERRITIN: CPT

## 2024-01-26 PROCEDURE — 36591 DRAW BLOOD OFF VENOUS DEVICE: CPT

## 2024-01-26 PROCEDURE — 96376 TX/PRO/DX INJ SAME DRUG ADON: CPT

## 2024-01-26 PROCEDURE — 258N000003 HC RX IP 258 OP 636: Performed by: PEDIATRICS

## 2024-01-26 PROCEDURE — 96375 TX/PRO/DX INJ NEW DRUG ADDON: CPT

## 2024-01-26 PROCEDURE — 96365 THER/PROPH/DIAG IV INF INIT: CPT

## 2024-01-26 PROCEDURE — 83690 ASSAY OF LIPASE: CPT

## 2024-01-26 PROCEDURE — 85045 AUTOMATED RETICULOCYTE COUNT: CPT | Performed by: PEDIATRICS

## 2024-01-26 PROCEDURE — 250N000011 HC RX IP 250 OP 636: Performed by: PEDIATRICS

## 2024-01-26 PROCEDURE — 96361 HYDRATE IV INFUSION ADD-ON: CPT

## 2024-01-26 PROCEDURE — 250N000013 HC RX MED GY IP 250 OP 250 PS 637: Performed by: REGISTERED NURSE

## 2024-01-26 PROCEDURE — 85025 COMPLETE CBC W/AUTO DIFF WBC: CPT | Performed by: PEDIATRICS

## 2024-01-26 PROCEDURE — 80053 COMPREHEN METABOLIC PANEL: CPT

## 2024-01-26 PROCEDURE — 250N000013 HC RX MED GY IP 250 OP 250 PS 637: Performed by: PEDIATRICS

## 2024-01-26 PROCEDURE — 96413 CHEMO IV INFUSION 1 HR: CPT

## 2024-01-26 RX ORDER — HEPARIN SODIUM,PORCINE 10 UNIT/ML
5 VIAL (ML) INTRAVENOUS
Status: CANCELLED | OUTPATIENT
Start: 2024-04-01

## 2024-01-26 RX ORDER — HEPARIN SODIUM (PORCINE) LOCK FLUSH IV SOLN 100 UNIT/ML 100 UNIT/ML
5 SOLUTION INTRAVENOUS
Status: COMPLETED | OUTPATIENT
Start: 2024-01-26 | End: 2024-01-26

## 2024-01-26 RX ORDER — DIPHENHYDRAMINE HCL 25 MG
25 CAPSULE ORAL
Status: COMPLETED | OUTPATIENT
Start: 2024-01-26 | End: 2024-01-26

## 2024-01-26 RX ORDER — DIPHENHYDRAMINE HCL 25 MG
50 CAPSULE ORAL
Status: CANCELLED
Start: 2024-04-01

## 2024-01-26 RX ORDER — ONDANSETRON 2 MG/ML
8 INJECTION INTRAMUSCULAR; INTRAVENOUS EVERY 6 HOURS PRN
Status: DISCONTINUED | OUTPATIENT
Start: 2024-01-26 | End: 2024-01-26 | Stop reason: HOSPADM

## 2024-01-26 RX ORDER — HEPARIN SODIUM (PORCINE) LOCK FLUSH IV SOLN 100 UNIT/ML 100 UNIT/ML
5 SOLUTION INTRAVENOUS
Status: DISCONTINUED | OUTPATIENT
Start: 2024-01-26 | End: 2024-01-26 | Stop reason: HOSPADM

## 2024-01-26 RX ORDER — DIPHENHYDRAMINE HCL 25 MG
25 CAPSULE ORAL
Status: CANCELLED
Start: 2024-04-01

## 2024-01-26 RX ORDER — ONDANSETRON 2 MG/ML
8 INJECTION INTRAMUSCULAR; INTRAVENOUS EVERY 6 HOURS PRN
Status: CANCELLED
Start: 2024-04-01

## 2024-01-26 RX ORDER — KETOROLAC TROMETHAMINE 30 MG/ML
30 INJECTION, SOLUTION INTRAMUSCULAR; INTRAVENOUS ONCE
Status: CANCELLED
Start: 2024-04-01 | End: 2024-04-01

## 2024-01-26 RX ORDER — KETOROLAC TROMETHAMINE 30 MG/ML
30 INJECTION, SOLUTION INTRAMUSCULAR; INTRAVENOUS ONCE
Status: COMPLETED | OUTPATIENT
Start: 2024-01-26 | End: 2024-01-26

## 2024-01-26 RX ORDER — HEPARIN SODIUM (PORCINE) LOCK FLUSH IV SOLN 100 UNIT/ML 100 UNIT/ML
5 SOLUTION INTRAVENOUS
Status: CANCELLED | OUTPATIENT
Start: 2024-04-01

## 2024-01-26 RX ORDER — DIPHENHYDRAMINE HCL 25 MG
25 CAPSULE ORAL EVERY 6 HOURS PRN
Status: DISCONTINUED | OUTPATIENT
Start: 2024-01-26 | End: 2024-01-26 | Stop reason: HOSPADM

## 2024-01-26 RX ORDER — ONDANSETRON 4 MG/1
8 TABLET, FILM COATED ORAL
Status: CANCELLED
Start: 2024-04-01

## 2024-01-26 RX ADMIN — HYDROMORPHONE HYDROCHLORIDE 1 MG: 1 INJECTION, SOLUTION INTRAMUSCULAR; INTRAVENOUS; SUBCUTANEOUS at 13:30

## 2024-01-26 RX ADMIN — Medication 5 ML: at 10:59

## 2024-01-26 RX ADMIN — Medication 5 ML: at 14:56

## 2024-01-26 RX ADMIN — SODIUM CHLORIDE, POTASSIUM CHLORIDE, SODIUM LACTATE AND CALCIUM CHLORIDE 1000 ML: 600; 310; 30; 20 INJECTION, SOLUTION INTRAVENOUS at 13:12

## 2024-01-26 RX ADMIN — DIPHENHYDRAMINE HYDROCHLORIDE 25 MG: 25 CAPSULE ORAL at 12:48

## 2024-01-26 RX ADMIN — KETOROLAC TROMETHAMINE 30 MG: 30 INJECTION, SOLUTION INTRAMUSCULAR; INTRAVENOUS at 12:18

## 2024-01-26 RX ADMIN — HYDROMORPHONE HYDROCHLORIDE 1 MG: 1 INJECTION, SOLUTION INTRAMUSCULAR; INTRAVENOUS; SUBCUTANEOUS at 14:33

## 2024-01-26 RX ADMIN — ONDANSETRON 8 MG: 2 INJECTION INTRAMUSCULAR; INTRAVENOUS at 12:18

## 2024-01-26 RX ADMIN — SODIUM CHLORIDE 331 MG: 9 INJECTION, SOLUTION INTRAVENOUS at 11:54

## 2024-01-26 RX ADMIN — HYDROMORPHONE HYDROCHLORIDE 1 MG: 1 INJECTION, SOLUTION INTRAMUSCULAR; INTRAVENOUS; SUBCUTANEOUS at 12:25

## 2024-01-26 ASSESSMENT — PAIN SCALES - GENERAL: PAINLEVEL: WORST PAIN (10)

## 2024-01-26 NOTE — NURSING NOTE
"Chief Complaint   Patient presents with    Port Draw     Labs drawn from port by rn.  Vs taken.     Port accessed with 20 gauge 3/4\" gripper needle  and labs (including research labs) drawn by rn.  Port flushed with NS and heparin.  Pt tolerated well.  VS taken.  Pt checked in for next appt.    Gay Rice RN      "

## 2024-01-26 NOTE — TELEPHONE ENCOUNTER
Call from pt to see if Patricia Mantilla received her message about seeing her in infusion if possible today. Pt mentioned pain, nausea, and asthma issues. Writer informed Patricia does not have openings for a formal visit, but wants infusion nurse to assess her and they can page her if needed for symptoms. Pt voiced understanding.

## 2024-01-26 NOTE — PATIENT INSTRUCTIONS
[facility] Triage and after hours / weekends / holidays:  [phone]    Please call the triage or after hours line if you experience a temperature greater than or equal to 100.4, shaking chills, have uncontrolled nausea, vomiting and/or diarrhea, dizziness, shortness of breath, chest pain, bleeding, unexplained bruising, or if you have any other new/concerning symptoms, questions or concerns.      If you are having any concerning symptoms or wish to speak to a provider before your next infusion visit, please call triage to notify them so we can adequately serve you.     If you need a refill on a narcotic prescription or other medication, please call before your infusion appointment.                January 2024 Sunday Monday Tuesday Wednesday Thursday Friday Saturday        1     2    BMT INFUSION 3 HR (180 MIN)  10:00 AM   (180 min.)    BMT INFUSION NURSE   [facility] 3     4    NEUROTOXIN   9:45 AM   (60 min.)   Katherine Pierce MD   [facility]    BMT INFUSION 3 HR (180 MIN)  11:30 AM   (180 min.)    BMT INFUSION NURSE   [facility] [place] 5     6    Admission   3:02 AM   Andrew Chou MD   [facility] Emergency Department   (Discharge: 1/6/2024)   7     8    BMT INFUSION 2 HR (120 MIN)  12:00 PM   (120 min.)    BMT INFUSION NURSE   [facility] [place] 9     10    BMT INFUSION 2 HR (120 MIN)   1:30 PM   (120 min.)    BMT INFUSION NURSE   [facility] 11     12    Admission   8:54 AM   Suraj Pino MD   [facility] Emergency Department   (Discharge: 1/12/2024) 13       14     15    SPEC INFUSION 3 HR (180 MIN)   9:00 AM   (180 min.)    SIPC INFUSION NURSE   [facility] [place] 16     17      18    BMT INFUSION 3 HR (180 MIN)  10:00 AM   (180 min.)   [facility] BMT INFUSION NURSE   [facility] [place] 19     20       21     22    ONC INFUSION 3 HR (180 MIN)  11:00 AM   (180 min.)    ONC INFUSION NURSE   [facility]    RETURN CCSL   2:30 PM   (45 min.)   Patricia Mantilla CNP   [facility] 23     24    BMT INFUSION 3 HR (180 MIN)   1:00 PM   (180 min.)    BMT INFUSION NURSE   [facility] [place] 25     26    LAB CENTRAL  11:00 AM   (15 min.)   [facility] DRAW   [facility]    ONC INFUSION 4 HR (240 MIN)  11:30 AM   (240 min.)    ONC INFUSION NURSE   [facility] 27       28     29     30     31 February 2024 Sunday Monday Tuesday Wednesday Thursday Friday Saturday                       1     2     3       4     5     6     7    ADULT HEARING EVALUATION   7:00 AM   (60 min.)   Yashira Lewis AuD   [facility] 8     9    RETURN ASTHMA  12:45 PM   (40 min.)   Jake Harvey MD   [facility] 10       11     12     13     14     15     16     17       18     19     20     21     22     23    LAB CENTRAL  10:00 AM   (15 min.)   [facility]RAW   [facility]    RETURN ACTIVE TREATMENT  10:15 AM   (45 min.)   Patricia Mantilla CNP   [facility]    ONC INFUSION 4 HR (240 MIN)  11:30 AM   (240 min.)    ONC INFUSION NURSE   [facility] 24       25     26     27     28     29                              Recent Results (from the past 24 hour(s))   Reticulocyte count    Collection Time: 01/26/24 11:05 AM   Result Value Ref Range    % Reticulocyte 27.8 (H) 0.5 - 2.0 %    Absolute  Reticulocyte 0.675 (H) 0.025 - 0.095 10e6/uL   Lipase    Collection Time: 01/26/24 11:05 AM   Result Value Ref Range    Lipase 24 13 - 60 U/L   Comprehensive metabolic panel    Collection Time: 01/26/24 11:05 AM   Result Value Ref Range    Sodium 137 135 - 145 mmol/L    Potassium 3.9 3.4 - 5.3 mmol/L    Carbon Dioxide (CO2) 22 22 - 29 mmol/L    Anion Gap 11 7 - 15 mmol/L    Urea Nitrogen 9.3 6.0 - 20.0 mg/dL    Creatinine 0.53 0.51 - 0.95 mg/dL    GFR Estimate >90 >60 mL/min/1.73m2    Calcium 9.2 8.6 - 10.0 mg/dL    Chloride 104 98 - 107 mmol/L    Glucose 92 70 - 99 mg/dL    Alkaline Phosphatase 63 40 - 150 U/L    AST 49 (H) 0 - 45 U/L    ALT 21 0 - 50 U/L    Protein Total 7.8 6.4 - 8.3 g/dL    Albumin 4.6 3.5 - 5.2 g/dL    Bilirubin Total 3.9 (H) <=1.2 mg/dL   Ferritin    Collection Time: 01/26/24 11:05 AM   Result Value Ref Range    Ferritin 5,234 (H) 6 - 175 ng/mL   CBC with platelets and differential    Collection Time: 01/26/24 11:05 AM   Result Value Ref Range    WBC Count 14.4 (H) 4.0 - 11.0 10e3/uL    RBC Count 2.53 (L) 3.80 - 5.20 10e6/uL    Hemoglobin 7.9 (L) 11.7 - 15.7 g/dL    Hematocrit 21.8 (L) 35.0 - 47.0 %    MCV 86 78 - 100 fL    MCH 31.2 26.5 - 33.0 pg    MCHC 36.2 31.5 - 36.5 g/dL    RDW 27.5 (H) 10.0 - 15.0 %    Platelet Count 405 150 - 450 10e3/uL    % Neutrophils 73 %    % Lymphocytes 12 %    % Monocytes 7 %    % Eosinophils 5 %    % Basophils 2 %    % Immature Granulocytes 1 %    NRBCs per 100 WBC 4 (H) <1 /100    Absolute Neutrophils 10.6 (H) 1.6 - 8.3 10e3/uL    Absolute Lymphocytes 1.7 0.8 - 5.3 10e3/uL    Absolute Monocytes 1.1 0.0 - 1.3 10e3/uL    Absolute Eosinophils 0.7 0.0 - 0.7 10e3/uL    Absolute Basophils 0.3 (H) 0.0 - 0.2 10e3/uL    Absolute Immature Granulocytes 0.1 <=0.4 10e3/uL    Absolute NRBCs 0.6 10e3/uL

## 2024-01-26 NOTE — PROGRESS NOTES
Infusion Nursing Note:  Jennifer Cervantes presents today for Jr + Pain meds.    Patient seen by provider today: No   present during visit today: Not Applicable.    Note: Pt presents to infusion today with 10/10 generalized sickle cell pain. Pt also reports nausea with vomiting x2 the last two nights. Pt reports asthma attack in the middle of the night. States inhalers are not working but nebulizers are.     IZA Mantilla CNP/Janice Sehlley RN:  - Ok to use pain treatment plan today  - Ok to increase benadryl dose to 50mg d/t itchining and hives with dilaudid    At time of discharge pt rates pain level 3/10 and reports tolerable to go home.    Intravenous Access:  Implanted Port.    Treatment Conditions:  Lab Results   Component Value Date    HGB 7.9 (L) 01/26/2024    WBC 14.4 (H) 01/26/2024    ANEU 9.9 (H) 11/29/2023    ANEUTAUTO 10.6 (H) 01/26/2024     01/26/2024        Lab Results   Component Value Date     01/26/2024    POTASSIUM 3.9 01/26/2024    MAG 2.0 11/11/2021    CR 0.53 01/26/2024    IMCAH 9.2 01/26/2024    BILITOTAL 3.9 (H) 01/26/2024    ALBUMIN 4.6 01/26/2024    ALT 21 01/26/2024    AST 49 (H) 01/26/2024     Results reviewed, labs MET treatment parameters, ok to proceed with treatment.      Post Infusion Assessment:  Patient tolerated infusion without incident.  Blood return noted pre and post infusion.  Site patent and intact, free from redness, edema or discomfort.  No evidence of extravasations.  Access discontinued per protocol.       Discharge Plan:   Patient declined prescription refills.  Discharge instructions reviewed with: Patient.  Patient and/or family verbalized understanding of discharge instructions and all questions answered.  AVS to patient via [employer].  Patient will return 2/23/24 for next appointment.   Patient discharged in stable condition accompanied by: self.  Departure Mode: Ambulatory.      Janice Shelley RN

## 2024-01-26 NOTE — TELEPHONE ENCOUNTER
Jennifer calls in wondering if Patricia Jose Rafael can see her in clinic when she is at clinic for Jr appointment.   Pain is a 10.   States having multiple things going on right now but won't tell this writer exactly what her issues are besides her increased pain.     Epic secure chat sent to Patricia Jose Rafael   She will have Jennifer's infusion nurse check in with her once Jennifer is in infusion. She does not have an opening for a formal visit.

## 2024-01-29 ENCOUNTER — NURSE TRIAGE (OUTPATIENT)
Dept: ONCOLOGY | Facility: CLINIC | Age: 24
End: 2024-01-29
Payer: COMMERCIAL

## 2024-01-29 ENCOUNTER — INFUSION THERAPY VISIT (OUTPATIENT)
Dept: ONCOLOGY | Facility: CLINIC | Age: 24
End: 2024-01-29
Attending: PEDIATRICS
Payer: COMMERCIAL

## 2024-01-29 ENCOUNTER — PATIENT OUTREACH (OUTPATIENT)
Dept: CARE COORDINATION | Facility: CLINIC | Age: 24
End: 2024-01-29
Payer: COMMERCIAL

## 2024-01-29 VITALS
DIASTOLIC BLOOD PRESSURE: 77 MMHG | SYSTOLIC BLOOD PRESSURE: 119 MMHG | OXYGEN SATURATION: 93 % | RESPIRATION RATE: 18 BRPM | TEMPERATURE: 97.8 F | HEART RATE: 90 BPM

## 2024-01-29 DIAGNOSIS — D57.00 SICKLE CELL PAIN CRISIS (H): Primary | ICD-10-CM

## 2024-01-29 DIAGNOSIS — G81.10 SPASTIC HEMIPLEGIA, UNSPECIFIED ETIOLOGY, UNSPECIFIED LATERALITY (H): ICD-10-CM

## 2024-01-29 PROCEDURE — 250N000011 HC RX IP 250 OP 636: Performed by: PEDIATRICS

## 2024-01-29 PROCEDURE — 96376 TX/PRO/DX INJ SAME DRUG ADON: CPT

## 2024-01-29 PROCEDURE — 250N000013 HC RX MED GY IP 250 OP 250 PS 637: Performed by: REGISTERED NURSE

## 2024-01-29 PROCEDURE — 96361 HYDRATE IV INFUSION ADD-ON: CPT

## 2024-01-29 PROCEDURE — 258N000003 HC RX IP 258 OP 636: Performed by: PEDIATRICS

## 2024-01-29 PROCEDURE — 96375 TX/PRO/DX INJ NEW DRUG ADDON: CPT

## 2024-01-29 PROCEDURE — 96374 THER/PROPH/DIAG INJ IV PUSH: CPT

## 2024-01-29 RX ORDER — KETOROLAC TROMETHAMINE 30 MG/ML
30 INJECTION, SOLUTION INTRAMUSCULAR; INTRAVENOUS ONCE
Status: COMPLETED | OUTPATIENT
Start: 2024-01-29 | End: 2024-01-29

## 2024-01-29 RX ORDER — HEPARIN SODIUM (PORCINE) LOCK FLUSH IV SOLN 100 UNIT/ML 100 UNIT/ML
5 SOLUTION INTRAVENOUS
Status: CANCELLED | OUTPATIENT
Start: 2024-04-01

## 2024-01-29 RX ORDER — KETOROLAC TROMETHAMINE 30 MG/ML
30 INJECTION, SOLUTION INTRAMUSCULAR; INTRAVENOUS ONCE
Status: CANCELLED
Start: 2024-04-01 | End: 2024-04-01

## 2024-01-29 RX ORDER — HEPARIN SODIUM,PORCINE 10 UNIT/ML
5 VIAL (ML) INTRAVENOUS
Status: CANCELLED | OUTPATIENT
Start: 2024-04-01

## 2024-01-29 RX ORDER — ONDANSETRON 2 MG/ML
8 INJECTION INTRAMUSCULAR; INTRAVENOUS EVERY 6 HOURS PRN
Status: CANCELLED
Start: 2024-04-01

## 2024-01-29 RX ORDER — DIPHENHYDRAMINE HCL 25 MG
50 CAPSULE ORAL
Status: CANCELLED
Start: 2024-04-01

## 2024-01-29 RX ORDER — ONDANSETRON 2 MG/ML
8 INJECTION INTRAMUSCULAR; INTRAVENOUS EVERY 6 HOURS PRN
Status: DISCONTINUED | OUTPATIENT
Start: 2024-01-29 | End: 2024-01-29 | Stop reason: HOSPADM

## 2024-01-29 RX ORDER — HEPARIN SODIUM (PORCINE) LOCK FLUSH IV SOLN 100 UNIT/ML 100 UNIT/ML
5 SOLUTION INTRAVENOUS
Status: DISCONTINUED | OUTPATIENT
Start: 2024-01-29 | End: 2024-01-29 | Stop reason: HOSPADM

## 2024-01-29 RX ORDER — DIPHENHYDRAMINE HCL 25 MG
50 CAPSULE ORAL
Status: COMPLETED | OUTPATIENT
Start: 2024-01-29 | End: 2024-01-29

## 2024-01-29 RX ORDER — ONDANSETRON 4 MG/1
8 TABLET, FILM COATED ORAL
Status: CANCELLED
Start: 2024-04-01

## 2024-01-29 RX ADMIN — KETOROLAC TROMETHAMINE 30 MG: 30 INJECTION, SOLUTION INTRAMUSCULAR; INTRAVENOUS at 13:41

## 2024-01-29 RX ADMIN — HYDROMORPHONE HYDROCHLORIDE 1 MG: 1 INJECTION, SOLUTION INTRAMUSCULAR; INTRAVENOUS; SUBCUTANEOUS at 13:43

## 2024-01-29 RX ADMIN — HYDROMORPHONE HYDROCHLORIDE 1 MG: 1 INJECTION, SOLUTION INTRAMUSCULAR; INTRAVENOUS; SUBCUTANEOUS at 14:42

## 2024-01-29 RX ADMIN — ONDANSETRON 8 MG: 2 INJECTION INTRAMUSCULAR; INTRAVENOUS at 13:40

## 2024-01-29 RX ADMIN — Medication 5 ML: at 15:42

## 2024-01-29 RX ADMIN — SODIUM CHLORIDE, POTASSIUM CHLORIDE, SODIUM LACTATE AND CALCIUM CHLORIDE 1000 ML: 600; 310; 30; 20 INJECTION, SOLUTION INTRAVENOUS at 13:39

## 2024-01-29 RX ADMIN — HYDROMORPHONE HYDROCHLORIDE 1 MG: 1 INJECTION, SOLUTION INTRAMUSCULAR; INTRAVENOUS; SUBCUTANEOUS at 15:40

## 2024-01-29 RX ADMIN — DIPHENHYDRAMINE HYDROCHLORIDE 50 MG: 25 CAPSULE ORAL at 13:34

## 2024-01-29 ASSESSMENT — PAIN SCALES - GENERAL: PAINLEVEL: SEVERE PAIN (6)

## 2024-01-29 NOTE — TELEPHONE ENCOUNTER
Oncology Nurse Triage - Sickle Cell Pain Crisis:    Situation: Jennifer  calling about Sickle Cell Pain Crisis, requesting to be added on for IV fluids and pain medicine    Background:     Patient's last infusion was 1/26  Last clinic visit date:1/22  Does patient have active treatment plan?  Yes      Assessment of Symptoms:  Onset/Duration of symptoms: 2 day    Is it typical sickle cell pain? Yes   Location: Back, legs  Character: Sharp           Intensity: severe    Any radiation of pain, numbness, tingling, weakness, warmth, swelling, discoloration of arms or legs?No     Fever?No  (if yes max temperature recorded in last 24 hours):      Chest Pain Present: No     Shortness of breath: No     Other home therapies tried: HEAT/HEATING PAD     Last home medication taken and when: Oxy-about 90 min. Ago.    Any Refills Needed?: No     Does patient have transportation & length of time to get to clinic: No         Recommendations:     Pain meds/IVF.  Approved by Patricia Mantilla NP.    If you do not hear from the infusion center by 2pm then you will not be able to get in for an infusion today. If symptoms worsen while waiting for call back, and/or you experience fever, chills, SOB, chest pain, cough, n/v, dizziness, numbness, swelling, discoloration of extremities, then seek emergency evaluation in Emergency Department.     Please note, if you are late for your appt, you risk losing your infusion appt as it may delay another patient's infusion who arrived on time.       Patient got approved for IVF and pain meds today at 2;30.  Transportation from the clinic to home needed.  Being worked on by social work.     Called patient and advised her of plan.  She is already at clinic with a friend and is pleased about the 2:30 appt.

## 2024-01-29 NOTE — PROGRESS NOTES
Infusion Nursing Note:  Jennifer Cervantes presents today for IVF/Pain medication.    Patient seen by provider today: No   present during visit today: Not Applicable.    Note: Patient arrived at infusion endorse generalized constant sharp pain with PS 9/10. States its her usually sickle cell crisis pain. Denies fever/chills. NO new complaints.    Upon discharge patient had 3 doses of Dilaudid with PS 4/10. Patient agreed to go home. Patient verbalized knowledge on when and where to call if symptoms persists/worsen.       Intravenous Access:  Implanted Port.    Treatment Conditions:  Not Applicable.      Post Infusion Assessment:  Patient tolerated infusion without incident.  Blood return noted pre and post infusion.  Site patent and intact, free from redness, edema or discomfort.  No evidence of extravasations.  Access discontinued per protocol.    Discharge Plan:   Patient declined prescription refills.  Discharge instructions reviewed with: Patient.  Patient and/or family verbalized understanding of discharge instructions and all questions answered.  AVS to patient via [employer].  Patient will return 2/23/24 for next appointment.   Patient discharged in stable condition accompanied by: self.  Departure Mode: Ambulatory.      GLORIA YANCEY RN

## 2024-01-29 NOTE — PROGRESS NOTES
Social Work - Transportation  [facility]    Data/Intervention:  Patient Name: Jennifer Cervantes Goes By: Jennifer    /Age: 2000 (23 year old)    [facility]  Reason for Referral:  support requested for patient transportation needs for today's appointment.  Assessment:  called [employer] to arrange ride through patient's insurance. [employer] arranged will call ride with [employer]. Patient will need to call [phone] when ready for return ride home.  Plan: Patient is aware of the transportation plan.  available to assist with any other needs.    CARLOS Chavez,UDAY  Hematology/Oncology Social Worker  Phone:[phone] Pager: [phone]

## 2024-02-01 ENCOUNTER — PATIENT OUTREACH (OUTPATIENT)
Dept: CARE COORDINATION | Facility: CLINIC | Age: 24
End: 2024-02-01
Payer: COMMERCIAL

## 2024-02-01 ENCOUNTER — INFUSION THERAPY VISIT (OUTPATIENT)
Dept: ONCOLOGY | Facility: CLINIC | Age: 24
End: 2024-02-01
Attending: PEDIATRICS
Payer: COMMERCIAL

## 2024-02-01 ENCOUNTER — NURSE TRIAGE (OUTPATIENT)
Dept: ONCOLOGY | Facility: CLINIC | Age: 24
End: 2024-02-01
Payer: COMMERCIAL

## 2024-02-01 VITALS
OXYGEN SATURATION: 92 % | TEMPERATURE: 97.7 F | HEART RATE: 91 BPM | DIASTOLIC BLOOD PRESSURE: 75 MMHG | SYSTOLIC BLOOD PRESSURE: 120 MMHG

## 2024-02-01 DIAGNOSIS — D57.00 SICKLE CELL PAIN CRISIS (H): Primary | ICD-10-CM

## 2024-02-01 DIAGNOSIS — D57.00 SICKLE CELL PAIN CRISIS (H): ICD-10-CM

## 2024-02-01 DIAGNOSIS — K21.9 GASTROESOPHAGEAL REFLUX DISEASE, UNSPECIFIED WHETHER ESOPHAGITIS PRESENT: Primary | ICD-10-CM

## 2024-02-01 DIAGNOSIS — G81.10 SPASTIC HEMIPLEGIA, UNSPECIFIED ETIOLOGY, UNSPECIFIED LATERALITY (H): ICD-10-CM

## 2024-02-01 PROCEDURE — 96376 TX/PRO/DX INJ SAME DRUG ADON: CPT

## 2024-02-01 PROCEDURE — 250N000013 HC RX MED GY IP 250 OP 250 PS 637: Performed by: PEDIATRICS

## 2024-02-01 PROCEDURE — 96374 THER/PROPH/DIAG INJ IV PUSH: CPT

## 2024-02-01 PROCEDURE — 96375 TX/PRO/DX INJ NEW DRUG ADDON: CPT

## 2024-02-01 PROCEDURE — 250N000011 HC RX IP 250 OP 636: Performed by: PEDIATRICS

## 2024-02-01 PROCEDURE — 258N000003 HC RX IP 258 OP 636: Performed by: PEDIATRICS

## 2024-02-01 PROCEDURE — 96361 HYDRATE IV INFUSION ADD-ON: CPT

## 2024-02-01 PROCEDURE — G0463 HOSPITAL OUTPT CLINIC VISIT: HCPCS | Mod: 25

## 2024-02-01 RX ORDER — KETOROLAC TROMETHAMINE 30 MG/ML
30 INJECTION, SOLUTION INTRAMUSCULAR; INTRAVENOUS ONCE
Status: COMPLETED | OUTPATIENT
Start: 2024-02-01 | End: 2024-02-01

## 2024-02-01 RX ORDER — KETOROLAC TROMETHAMINE 30 MG/ML
30 INJECTION, SOLUTION INTRAMUSCULAR; INTRAVENOUS ONCE
Status: CANCELLED
Start: 2024-04-01 | End: 2024-04-01

## 2024-02-01 RX ORDER — ONDANSETRON 4 MG/1
8 TABLET, FILM COATED ORAL
Status: CANCELLED
Start: 2024-04-01

## 2024-02-01 RX ORDER — HEPARIN SODIUM (PORCINE) LOCK FLUSH IV SOLN 100 UNIT/ML 100 UNIT/ML
5 SOLUTION INTRAVENOUS
Status: CANCELLED | OUTPATIENT
Start: 2024-04-01

## 2024-02-01 RX ORDER — DIPHENHYDRAMINE HCL 25 MG
50 CAPSULE ORAL
Status: COMPLETED | OUTPATIENT
Start: 2024-02-01 | End: 2024-02-01

## 2024-02-01 RX ORDER — ONDANSETRON 2 MG/ML
8 INJECTION INTRAMUSCULAR; INTRAVENOUS EVERY 6 HOURS PRN
Status: DISCONTINUED | OUTPATIENT
Start: 2024-02-01 | End: 2024-02-01 | Stop reason: HOSPADM

## 2024-02-01 RX ORDER — HEPARIN SODIUM,PORCINE 10 UNIT/ML
5 VIAL (ML) INTRAVENOUS
Status: CANCELLED | OUTPATIENT
Start: 2024-04-01

## 2024-02-01 RX ORDER — DIPHENHYDRAMINE HCL 25 MG
50 CAPSULE ORAL
Status: CANCELLED
Start: 2024-04-01

## 2024-02-01 RX ORDER — OXYCODONE HYDROCHLORIDE 10 MG/1
10 TABLET ORAL EVERY 4 HOURS PRN
Qty: 20 TABLET | Refills: 0 | Status: SHIPPED | OUTPATIENT
Start: 2024-02-01 | End: 2024-02-08

## 2024-02-01 RX ORDER — HEPARIN SODIUM (PORCINE) LOCK FLUSH IV SOLN 100 UNIT/ML 100 UNIT/ML
5 SOLUTION INTRAVENOUS
Status: DISCONTINUED | OUTPATIENT
Start: 2024-02-01 | End: 2024-02-01 | Stop reason: HOSPADM

## 2024-02-01 RX ORDER — ONDANSETRON 2 MG/ML
8 INJECTION INTRAMUSCULAR; INTRAVENOUS EVERY 6 HOURS PRN
Status: CANCELLED
Start: 2024-04-01

## 2024-02-01 RX ADMIN — SODIUM CHLORIDE, POTASSIUM CHLORIDE, SODIUM LACTATE AND CALCIUM CHLORIDE 1000 ML: 600; 310; 30; 20 INJECTION, SOLUTION INTRAVENOUS at 10:59

## 2024-02-01 RX ADMIN — HYDROMORPHONE HYDROCHLORIDE 1 MG: 1 INJECTION, SOLUTION INTRAMUSCULAR; INTRAVENOUS; SUBCUTANEOUS at 11:07

## 2024-02-01 RX ADMIN — HYDROMORPHONE HYDROCHLORIDE 1 MG: 1 INJECTION, SOLUTION INTRAMUSCULAR; INTRAVENOUS; SUBCUTANEOUS at 13:16

## 2024-02-01 RX ADMIN — DIPHENHYDRAMINE HYDROCHLORIDE 50 MG: 25 CAPSULE ORAL at 11:00

## 2024-02-01 RX ADMIN — ONDANSETRON 8 MG: 2 INJECTION INTRAMUSCULAR; INTRAVENOUS at 11:02

## 2024-02-01 RX ADMIN — Medication 5 ML: at 14:55

## 2024-02-01 RX ADMIN — HYDROMORPHONE HYDROCHLORIDE 1 MG: 1 INJECTION, SOLUTION INTRAMUSCULAR; INTRAVENOUS; SUBCUTANEOUS at 12:09

## 2024-02-01 RX ADMIN — KETOROLAC TROMETHAMINE 30 MG: 30 INJECTION, SOLUTION INTRAMUSCULAR; INTRAVENOUS at 11:05

## 2024-02-01 ASSESSMENT — PAIN SCALES - GENERAL: PAINLEVEL: SEVERE PAIN (6)

## 2024-02-01 NOTE — TELEPHONE ENCOUNTER
Ange can take at 10:30 for IVF/pain meds     Call placed to Jennifer and she can make it to that appt.     Message sent to [facility] to schedule     Message sent to Social workers to arrange transportation.
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
## 2024-02-01 NOTE — TELEPHONE ENCOUNTER
Group Topic:  Coping Skills Education    Date: 3/14/2020  Start Time: 1300  End Time: 1345  Facilitators: Jessica Urias    Focus: Self-Activation  Number in attendance: 3    Patient engaged in coping skills group on nurturing self-awareness for recovery. Patient was encouraged to explore their current emotional, knowledge and confidence awareness through community building where insight can be attained through conversation.    Patient participated and created positive ways to promote self-care with the game Newsvine. The focus of the game of Newsvine is to encourage patients to think of activities they could do to promote positive self-care throughout their treatment.  Method: Group  Attendance: Present  Participation: Active  Patient Response: Appropriate feedback, Attentive, Good eye contact, Interactive and Interested in topic  Mood: Bright  Affect: Calm, Positive and Relaxed  Behavior/Socialization: Appropriate to group, Cooperative and Engaged  Thought Process: Focused  Task Performance: Follows directions     Jessica Urias, LPC-IT, SAC-IT       Oncology Nurse Triage - Sickle Cell Pain Crisis:    Situation: Jennifer  calling about Sickle Cell Pain Crisis, requesting to be added on for IV fluids and pain medicine    Background:     Patient's last infusion was 1/29/24  Last clinic visit date:1/22/24 Patricia Mantilla   Does patient have active treatment plan?  Yes      Assessment of Symptoms:  Onset/Duration of symptoms: 2 day    Is it typical sickle cell pain? Yes   Location: all over pain   Character: Sharp           Intensity: 9/10    Any radiation of pain, numbness, tingling, weakness, warmth, swelling, discoloration of arms or legs?No     Fever?No  (if yes max temperature recorded in last 24 hours):      Chest Pain Present: No     Shortness of breath: No     Other home therapies tried: HEAT/HEATING PAD and WARM BATH     Last home medication taken and when: Oxycodone at 6am     Any Refills Needed?: No     Does patient have transportation & length of time to get to clinic: No  needs transportation to clinic unless it is an early morning spot then she can provide her own to clinic         Recommendations:     Placed on infusion call list     If you do not hear from the infusion center by 2pm then you will not be able to get in for an infusion today. If symptoms worsen while waiting for call back, and/or you experience fever, chills, SOB, chest pain, cough, n/v, dizziness, numbness, swelling, discoloration of extremities, then seek emergency evaluation in Emergency Department.     Please note, if you are late for your appt, you risk losing your infusion appt as it may delay another patient's infusion who arrived on time.

## 2024-02-01 NOTE — PROGRESS NOTES
Community Health Worker Note: Telephone Call  Oncology Clinic     Data/Intervention:  Patient Name:  Jennifer Cervantes DOB/Age: [birth year]     Call From: Triage Nurse        Reason for Call:  Transportation      Assessment:     CHW also called [employer]  ([phone] )  to arrange ride through patient's insurance.  [employer] arranged a round trip ride with [employer] transportation  Patient will need to call when ready for return ride home [phone]. Talked to patient and she will call them to setup  time.     Plan:  Patient is aware of the transportation plan. /CHW available to assist with any other needs.      Isaura OTTO Community Health Worker  Clinic Care Coordination  [facility]  Phone: [phone]

## 2024-02-01 NOTE — PROGRESS NOTES
Infusion Nursing Note:  Jennifer Cervantes presents today for IV fluids, pain medications.    Patient seen by provider today: No   present during visit today: Not Applicable.    Note: Jennifer presents today feeling overall okay. Endorses generalized muscle/joint pain, rate 9/10 this morning. She last took her oxycodone at 6 AM today with little relief. She has been using heat as well. Endorses ongoing intermittent nausea/vomiting, has been using zofran with little relief. She reports vomiting occurs every other day, roughly, usually right after she eats. Because the vomiting happens so quickly after she starts feeling nauseated, she feels she cannot always keep up with the nausea. Denies fevers/chills. Denies SOB, cough, chest pain, or dizziness/lightheadedness. Denies constipation/diarrhea. Denies urinary issues. Endorses unchanged neuropathy. Offers no new concerns at this appointment.    Per written communication with Patricia Mantilla CNP/Gretchen Cruz RN   Could trial a PPI for stomach issues (may be more effective than antiemetic given symptoms), but would like an H. Pylori test completed before since meds can alter test results    Discussed with Jenniefr. She expressed frustration with the test requirements and declined the H. Pylori testing. She also expressed frustration with overall symptom management. We discussed using zofran 45 minutes prior to meals to help with symptoms around eating.    Per written communication with Patricia Mantilla CNP/Gretchen Cruz, RN   Prescription sent for omeprazole, ok to skip H. Pylori testing  Scheduled Jennifer for in-person visit on 02/05 for follow-up on overall symptom management and other questions Jennifer has regarding her treatment plan    At time of discharge, Jennifer rates her pain at 4/10 and feels comfortable going home to further manage pain and has a ride set up to bring her home. She will call clinic triage or report to ED if pain becomes unmanageable.    Intravenous  Access:  Implanted Port.    Treatment Conditions:  Not Applicable.      Post Infusion Assessment:  Patient tolerated infusion without incident.  Blood return noted pre and post infusion.  Site patent and intact, free from redness, edema or discomfort.  No evidence of extravasations.  Access discontinued per protocol.       Discharge Plan:   Prescription refills given for aspirin, zofran, jadenu, hydrea.  Discharge instructions reviewed with: Patient.  Patient and/or family verbalized understanding of discharge instructions and all questions answered.  AVS to patient via [employer].  Patient will return 02/23 for next infusion appointment or PRN for pain management.   Patient discharged in stable condition accompanied by: self.  Departure Mode: Ambulatory.      Gretchen Cruz RN

## 2024-02-01 NOTE — PATIENT INSTRUCTIONS
[facility] Triage and after hours / weekends / holidays:  [phone]    Please call the triage or after hours line if you experience a temperature greater than or equal to 100.4, shaking chills, have uncontrolled nausea, vomiting and/or diarrhea, dizziness, shortness of breath, chest pain, bleeding, unexplained bruising, or if you have any other new/concerning symptoms, questions, or concerns.      If you are having any concerning symptoms or wish to speak to a provider before your next infusion visit, please call your care coordinator or triage to notify them so we can adequately serve you.     If you need a refill on a narcotic prescription or other medication, please call before your infusion appointment.

## 2024-02-03 ENCOUNTER — HOSPITAL ENCOUNTER (EMERGENCY)
Facility: CLINIC | Age: 24
Discharge: HOME | End: 2024-02-04
Attending: EMERGENCY MEDICINE | Admitting: EMERGENCY MEDICINE
Payer: COMMERCIAL

## 2024-02-03 DIAGNOSIS — D57.00 SICKLE CELL PAIN CRISIS (H): ICD-10-CM

## 2024-02-03 LAB
ALBUMIN SERPL BCG-MCNC: 4.4 G/DL (ref 3.5–5.2)
ALP SERPL-CCNC: 70 U/L (ref 40–150)
ALT SERPL W P-5'-P-CCNC: 43 U/L (ref 0–50)
ANION GAP SERPL CALCULATED.3IONS-SCNC: 10 MMOL/L (ref 7–15)
AST SERPL W P-5'-P-CCNC: 60 U/L (ref 0–45)
BASOPHILS # BLD AUTO: 0.2 10E3/UL (ref 0–0.2)
BASOPHILS NFR BLD AUTO: 1 %
BILIRUB SERPL-MCNC: 3.5 MG/DL
BUN SERPL-MCNC: 8.2 MG/DL (ref 6–20)
CALCIUM SERPL-MCNC: 8.9 MG/DL (ref 8.6–10)
CHLORIDE SERPL-SCNC: 107 MMOL/L (ref 98–107)
CREAT SERPL-MCNC: 0.57 MG/DL (ref 0.51–0.95)
DEPRECATED HCO3 PLAS-SCNC: 24 MMOL/L (ref 22–29)
EGFRCR SERPLBLD CKD-EPI 2021: >90 ML/MIN/1.73M2
EOSINOPHIL # BLD AUTO: 0.8 10E3/UL (ref 0–0.7)
EOSINOPHIL NFR BLD AUTO: 6 %
ERYTHROCYTE [DISTWIDTH] IN BLOOD BY AUTOMATED COUNT: 26.9 % (ref 10–15)
GLUCOSE SERPL-MCNC: 97 MG/DL (ref 70–99)
HCG SERPL QL: NEGATIVE
HCT VFR BLD AUTO: 20.6 % (ref 35–47)
HGB BLD-MCNC: 7.3 G/DL (ref 11.7–15.7)
IMM GRANULOCYTES # BLD: 0 10E3/UL
IMM GRANULOCYTES NFR BLD: 0 %
LYMPHOCYTES # BLD AUTO: 2.6 10E3/UL (ref 0.8–5.3)
LYMPHOCYTES NFR BLD AUTO: 19 %
MCH RBC QN AUTO: 30.8 PG (ref 26.5–33)
MCHC RBC AUTO-ENTMCNC: 35.4 G/DL (ref 31.5–36.5)
MCV RBC AUTO: 87 FL (ref 78–100)
MONOCYTES # BLD AUTO: 1 10E3/UL (ref 0–1.3)
MONOCYTES NFR BLD AUTO: 8 %
NEUTROPHILS # BLD AUTO: 9 10E3/UL (ref 1.6–8.3)
NEUTROPHILS NFR BLD AUTO: 66 %
NRBC # BLD AUTO: 0.4 10E3/UL
NRBC BLD AUTO-RTO: 3 /100
PLATELET # BLD AUTO: 384 10E3/UL (ref 150–450)
POTASSIUM SERPL-SCNC: 4 MMOL/L (ref 3.4–5.3)
PROT SERPL-MCNC: 7.2 G/DL (ref 6.4–8.3)
RBC # BLD AUTO: 2.37 10E6/UL (ref 3.8–5.2)
RETICS # AUTO: 0.55 10E6/UL (ref 0.03–0.1)
RETICS/RBC NFR AUTO: 22.4 % (ref 0.5–2)
SODIUM SERPL-SCNC: 141 MMOL/L (ref 135–145)
WBC # BLD AUTO: 12.5 10E3/UL (ref 4–11)

## 2024-02-03 PROCEDURE — 96376 TX/PRO/DX INJ SAME DRUG ADON: CPT | Performed by: EMERGENCY MEDICINE

## 2024-02-03 PROCEDURE — 96361 HYDRATE IV INFUSION ADD-ON: CPT | Performed by: EMERGENCY MEDICINE

## 2024-02-03 PROCEDURE — 99284 EMERGENCY DEPT VISIT MOD MDM: CPT | Mod: 25 | Performed by: EMERGENCY MEDICINE

## 2024-02-03 PROCEDURE — 99284 EMERGENCY DEPT VISIT MOD MDM: CPT | Performed by: EMERGENCY MEDICINE

## 2024-02-03 PROCEDURE — 84703 CHORIONIC GONADOTROPIN ASSAY: CPT | Performed by: EMERGENCY MEDICINE

## 2024-02-03 PROCEDURE — 250N000011 HC RX IP 250 OP 636: Performed by: EMERGENCY MEDICINE

## 2024-02-03 PROCEDURE — 85004 AUTOMATED DIFF WBC COUNT: CPT | Performed by: EMERGENCY MEDICINE

## 2024-02-03 PROCEDURE — 258N000003 HC RX IP 258 OP 636: Performed by: EMERGENCY MEDICINE

## 2024-02-03 PROCEDURE — 80053 COMPREHEN METABOLIC PANEL: CPT | Performed by: EMERGENCY MEDICINE

## 2024-02-03 PROCEDURE — 85045 AUTOMATED RETICULOCYTE COUNT: CPT | Performed by: EMERGENCY MEDICINE

## 2024-02-03 PROCEDURE — 96374 THER/PROPH/DIAG INJ IV PUSH: CPT | Performed by: EMERGENCY MEDICINE

## 2024-02-03 PROCEDURE — 96375 TX/PRO/DX INJ NEW DRUG ADDON: CPT | Performed by: EMERGENCY MEDICINE

## 2024-02-03 PROCEDURE — 36591 DRAW BLOOD OFF VENOUS DEVICE: CPT | Performed by: EMERGENCY MEDICINE

## 2024-02-03 RX ORDER — KETOROLAC TROMETHAMINE 15 MG/ML
15 INJECTION, SOLUTION INTRAMUSCULAR; INTRAVENOUS ONCE
Status: COMPLETED | OUTPATIENT
Start: 2024-02-03 | End: 2024-02-03

## 2024-02-03 RX ORDER — HEPARIN SODIUM (PORCINE) LOCK FLUSH IV SOLN 100 UNIT/ML 100 UNIT/ML
5-10 SOLUTION INTRAVENOUS
Status: DISCONTINUED | OUTPATIENT
Start: 2024-02-03 | End: 2024-02-04 | Stop reason: HOSPADM

## 2024-02-03 RX ADMIN — HYDROMORPHONE HYDROCHLORIDE 1 MG: 1 INJECTION, SOLUTION INTRAMUSCULAR; INTRAVENOUS; SUBCUTANEOUS at 21:50

## 2024-02-03 RX ADMIN — HYDROMORPHONE HYDROCHLORIDE 1 MG: 1 INJECTION, SOLUTION INTRAMUSCULAR; INTRAVENOUS; SUBCUTANEOUS at 22:53

## 2024-02-03 RX ADMIN — HYDROMORPHONE HYDROCHLORIDE 1 MG: 1 INJECTION, SOLUTION INTRAMUSCULAR; INTRAVENOUS; SUBCUTANEOUS at 23:55

## 2024-02-03 RX ADMIN — KETOROLAC TROMETHAMINE 15 MG: 15 INJECTION, SOLUTION INTRAMUSCULAR; INTRAVENOUS at 22:55

## 2024-02-03 RX ADMIN — SODIUM CHLORIDE 1000 ML: 9 INJECTION, SOLUTION INTRAVENOUS at 21:50

## 2024-02-03 ASSESSMENT — ACTIVITIES OF DAILY LIVING (ADL): ADLS_ACUITY_SCORE: 35

## 2024-02-04 VITALS
HEIGHT: 64 IN | BODY MASS INDEX: 25.27 KG/M2 | OXYGEN SATURATION: 96 % | HEART RATE: 90 BPM | RESPIRATION RATE: 18 BRPM | TEMPERATURE: 98.6 F | DIASTOLIC BLOOD PRESSURE: 70 MMHG | WEIGHT: 148 LBS | SYSTOLIC BLOOD PRESSURE: 125 MMHG

## 2024-02-04 LAB
ABO/RH(D): NORMAL
ANTIBODY SCREEN: NEGATIVE
SPECIMEN EXPIRATION DATE: NORMAL

## 2024-02-04 PROCEDURE — 250N000011 HC RX IP 250 OP 636: Performed by: EMERGENCY MEDICINE

## 2024-02-04 RX ADMIN — HEPARIN 5 ML: 100 SYRINGE at 00:32

## 2024-02-04 NOTE — ED PROVIDER NOTES
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  Patient states that the pain onset this morning around 5 AM.  She had taken her home pain medications (10 mg oxycodone), hydrated throughout the day, and took a hot shower, but with no relief.  Patient is instructed to take a dose of her oxycodone a maximum of 6 times per day.  States that she had taken 2 or 3 doses today.  Patient notes that the pain has been continuing to progress.  The pain is present to her entire body, especially her legs, arms, and lower back.  Patient denies fever, chills, nausea, diarrhea, or vomiting.  No chest pain, shortness of breath, cough, or sore throat.    Past Medical History  Past Medical History:   Diagnosis Date    Anxiety     Bleeding disorder (H24)     Blood clotting disorder (H24)     Cerebral infarction (H) 2015    Cognitive developmental delay     low IQ. 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 side (H)     right hand contractures    Iron overload due to repeated red blood cell transfusions     Migraines     Multiple subsegmental pulmonary emboli without acute cor pulmonale (H) 02/01/2021    Oppositional defiant behavior     Presence of intrauterine contraceptive device 2/18/2020    Superficial venous thrombosis of arm, right 03/25/2021    Uncomplicated asthma      Past Surgical History:   Procedure Laterality  Date    AS INSERT TUNNELED CV 2 CATH W/O PORT/PUMP      CHOLECYSTECTOMY      CV RIGHT HEART CATH MEASUREMENTS RECORDED N/A 11/18/2021    Procedure: Right Heart Cath;  Surgeon: Jackson Stauffer MD;  Location:  HEART CARDIAC CATH LAB    INSERT PORT VASCULAR ACCESS Left 4/21/2021    Procedure: INSERTION, VASCULAR ACCESS PORT (NOT SURE ON SIDE UNTIL REMOVAL);  Surgeon: Rajan More MD;  Location: [facility] OR    IR CHEST PORT PLACEMENT > 5 YRS OF AGE  4/21/2021    IR CVC NON TUNNEL LINE REMOVAL  5/6/2021    IR CVC NON TUNNEL PLACEMENT > 5 YRS  04/07/2020    IR CVC NON TUNNEL PLACEMENT > 5 YRS  4/30/2021    IR CVC NON TUNNEL PLACEMENT > 5 YRS  9/7/2022    IR PORT REMOVAL LEFT  4/21/2021    REMOVE PORT VASCULAR ACCESS Left 4/21/2021    Procedure: REMOVAL, VASCULAR ACCESS PORT LEFT;  Surgeon: Rajan More MD;  Location: [facility] OR    REPAIR TENDON ELBOW Right 10/02/2019    Procedure: Right Elbow Flexor Lengthening, Flexor Pronator Slide Of Wrist and Finger, Thumb Adductor Lengthening;  Surgeon: Anai Franco MD;  Location: [facility] OR    TONSILLECTOMY Bilateral 10/02/2019    Procedure: Bilateral Tonsillectomy;  Surgeon: Farhana Guy MD;  Location: [facility] OR    ZZC BREAST REDUCTION (INCLUDES LIPO) TIER 3 Bilateral 04/23/2019     acetaminophen (TYLENOL) 325 MG tablet  albuterol (PROAIR HFA/PROVENTIL HFA/VENTOLIN HFA) 108 (90 Base) MCG/ACT inhaler  albuterol (PROVENTIL) (2.5 MG/3ML) 0.083% neb solution  aspirin (ASA) 81 MG chewable tablet  budesonide-formoterol (SYMBICORT) 160-4.5 MCG/ACT Inhaler  cetirizine (ZYRTEC) 10 MG tablet  deferasirox (JADENU) 360 MG tablet  diphenhydrAMINE (BENADRYL) 25 MG capsule  EPINEPHrine (ANY BX GENERIC EQUIV) 0.3 MG/0.3ML injection 2-pack  Hydroxyurea 1000 MG TABS  methocarbamol (ROBAXIN) 750 MG tablet  naloxone (NARCAN) 4 MG/0.1ML nasal spray  omeprazole (PRILOSEC) 20 MG DR capsule  ondansetron (ZOFRAN) 8 MG tablet  oxyCODONE IR (ROXICODONE) 10 MG tablet      Allergies  "  Allergen Reactions    Contrast Dye      Hives and breathing issues    Fish-Derived Products Hives    Seafood Hives    Adhesive Tape Hives     Primipore dressing causes hives    Gadolinium     Iodinated Contrast Media      Family History  Family History   Problem Relation Age of Onset    Sickle Cell Trait Mother     Hypertension Mother     Asthma Mother     Sickle Cell Trait Father     Glaucoma No family hx of     Macular Degeneration No family hx of     Diabetes No family hx of     Gout No family hx of      Social History   Social History     Tobacco Use    Smoking status: Never     Passive exposure: Never    Smokeless tobacco: Never   Substance Use Topics    Alcohol use: Not Currently     Alcohol/week: 0.0 standard drinks of alcohol    Drug use: Never      Past medical history, past surgical history, medications, allergies, family history, and social history were reviewed with the patient. No additional pertinent items.          Physical Exam   BP: 131/69  Pulse: 108  Temp: 99  F (37.2  C)  Resp: 20  Height: 162.6 cm (5' 4\")  Weight: 67.1 kg (148 lb)  SpO2: 94 %  Physical Exam  Vitals and nursing note reviewed.   Constitutional:       General: She is not in acute distress.     Appearance: Normal appearance. She is well-developed.   HENT:      Head: Normocephalic and atraumatic.   Eyes:      General: No scleral icterus.     Conjunctiva/sclera: Conjunctivae normal.   Cardiovascular:      Rate and Rhythm: Normal rate.   Pulmonary:      Effort: Pulmonary effort is normal. No respiratory distress.   Abdominal:      General: Abdomen is flat.   Musculoskeletal:      Cervical back: Normal range of motion and neck supple.   Skin:     General: Skin is warm and dry.      Findings: No rash.   Neurological:      General: No focal deficit present.      Mental Status: She is alert and oriented to person, place, and time.             ED Course, Procedures, & Data    9:26 PM  The patient was seen and examined by Dmitri Thomas   " in Room ED09.     Procedures                   No results found for any visits on 02/03/24.  Medications - No data to display  Labs Ordered and Resulted from Time of ED Arrival to Time of ED Departure - No data to display  No orders to display          Critical care was not performed.     Medical Decision Making  The patient's presentation was of moderate complexity (a chronic illness mild to moderate exacerbation, progression, or side effect of treatment).    The patient's evaluation involved:  ordering and/or review of 3+ test(s) in this encounter (see separate area of note for details)    The patient's management necessitated high risk (a parenteral controlled substance).    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  Vital signs notable for pulse elevation 108 but otherwise afebrile stable including normal pulse ox at 96% room air.  IV established, labs sent revealed leukocytosis with a BVC of 12.2, hemoglobin decreased 7.3 otherwise stable CBC, elevated reticulocyte count of 23.4%, normal electrolytes.  Patient given a liter normal saline fluid bolus, Dilaudid 1 mg IV and Toradol 15 mg IV.  Dilaudid dosing was repeated x 3 total and upon repeat assessment patient's symptoms had resolved and she is requesting discharge home.    I have reviewed the nursing notes. I have reviewed the findings, diagnosis, plan and need for follow up with the patient.    New Prescriptions    No medications on file       Final diagnoses:   Sickle cell pain crisis (H)   Willow NEWMAN, tosha serving as a trained medical scribe to document services personally performed by Dmitri Thomas MD, based on the provider's  statements to me.      I, Dmitri Thomas MD, was physically present and have reviewed and verified the accuracy of this note documented by Willow Martinez.       Dmitri Thomas MD  [facility] EMERGENCY DEPARTMENT  2/3/2024     Dmitri Thomas MD  02/04/24 0102

## 2024-02-04 NOTE — ED TRIAGE NOTES
Patient arrives with sickle cell crisis pain. Started this morning, took home pain meds but no relief. Has hydrated throughout day and took hot shower, also no relief.    A+O x 4, speaking in full sentences. Driven to ED by friend.

## 2024-02-05 ENCOUNTER — PATIENT OUTREACH (OUTPATIENT)
Dept: CARE COORDINATION | Facility: CLINIC | Age: 24
End: 2024-02-05
Payer: COMMERCIAL

## 2024-02-05 ENCOUNTER — PATIENT OUTREACH (OUTPATIENT)
Dept: ONCOLOGY | Facility: CLINIC | Age: 24
End: 2024-02-05

## 2024-02-05 ENCOUNTER — ONCOLOGY VISIT (OUTPATIENT)
Dept: ONCOLOGY | Facility: CLINIC | Age: 24
End: 2024-02-05
Attending: PEDIATRICS
Payer: COMMERCIAL

## 2024-02-05 ENCOUNTER — NURSE TRIAGE (OUTPATIENT)
Dept: ONCOLOGY | Facility: CLINIC | Age: 24
End: 2024-02-05
Payer: COMMERCIAL

## 2024-02-05 ENCOUNTER — INFUSION THERAPY VISIT (OUTPATIENT)
Dept: TRANSPLANT | Facility: CLINIC | Age: 24
End: 2024-02-05
Attending: PEDIATRICS
Payer: COMMERCIAL

## 2024-02-05 VITALS
SYSTOLIC BLOOD PRESSURE: 123 MMHG | OXYGEN SATURATION: 90 % | TEMPERATURE: 98.1 F | DIASTOLIC BLOOD PRESSURE: 83 MMHG | RESPIRATION RATE: 16 BRPM | HEART RATE: 91 BPM

## 2024-02-05 DIAGNOSIS — R11.2 NAUSEA AND VOMITING, UNSPECIFIED VOMITING TYPE: ICD-10-CM

## 2024-02-05 DIAGNOSIS — G81.10 SPASTIC HEMIPLEGIA, UNSPECIFIED ETIOLOGY, UNSPECIFIED LATERALITY (H): ICD-10-CM

## 2024-02-05 DIAGNOSIS — D57.1 HB-SS DISEASE WITHOUT CRISIS (H): Primary | ICD-10-CM

## 2024-02-05 DIAGNOSIS — R14.0 ABDOMINAL DISTENTION: ICD-10-CM

## 2024-02-05 DIAGNOSIS — D57.00 SICKLE CELL PAIN CRISIS (H): Primary | ICD-10-CM

## 2024-02-05 DIAGNOSIS — D57.1 HB-SS DISEASE WITHOUT CRISIS (H): ICD-10-CM

## 2024-02-05 DIAGNOSIS — R10.13 EPIGASTRIC PAIN: ICD-10-CM

## 2024-02-05 DIAGNOSIS — R50.9 FEVER, UNSPECIFIED FEVER CAUSE: ICD-10-CM

## 2024-02-05 DIAGNOSIS — R06.02 SHORTNESS OF BREATH: ICD-10-CM

## 2024-02-05 LAB
BASOPHILS # BLD AUTO: 0.3 10E3/UL (ref 0–0.2)
BASOPHILS NFR BLD AUTO: 2 %
C PNEUM DNA SPEC QL NAA+PROBE: NOT DETECTED
EOSINOPHIL # BLD AUTO: 0.8 10E3/UL (ref 0–0.7)
EOSINOPHIL NFR BLD AUTO: 7 %
ERYTHROCYTE [DISTWIDTH] IN BLOOD BY AUTOMATED COUNT: 26.3 % (ref 10–15)
FLUAV H1 2009 PAND RNA SPEC QL NAA+PROBE: NOT DETECTED
FLUAV H1 RNA SPEC QL NAA+PROBE: NOT DETECTED
FLUAV H3 RNA SPEC QL NAA+PROBE: NOT DETECTED
FLUAV RNA SPEC QL NAA+PROBE: NOT DETECTED
FLUBV RNA SPEC QL NAA+PROBE: NOT DETECTED
HADV DNA SPEC QL NAA+PROBE: NOT DETECTED
HCOV PNL SPEC NAA+PROBE: DETECTED
HCT VFR BLD AUTO: 19.4 % (ref 35–47)
HGB BLD-MCNC: 7 G/DL (ref 11.7–15.7)
HMPV RNA SPEC QL NAA+PROBE: NOT DETECTED
HPIV1 RNA SPEC QL NAA+PROBE: NOT DETECTED
HPIV2 RNA SPEC QL NAA+PROBE: NOT DETECTED
HPIV3 RNA SPEC QL NAA+PROBE: NOT DETECTED
HPIV4 RNA SPEC QL NAA+PROBE: NOT DETECTED
IMM GRANULOCYTES # BLD: 0.1 10E3/UL
IMM GRANULOCYTES NFR BLD: 1 %
LYMPHOCYTES # BLD AUTO: 1.9 10E3/UL (ref 0.8–5.3)
LYMPHOCYTES NFR BLD AUTO: 16 %
M PNEUMO DNA SPEC QL NAA+PROBE: NOT DETECTED
MCH RBC QN AUTO: 30.3 PG (ref 26.5–33)
MCHC RBC AUTO-ENTMCNC: 36.1 G/DL (ref 31.5–36.5)
MCV RBC AUTO: 84 FL (ref 78–100)
MONOCYTES # BLD AUTO: 0.8 10E3/UL (ref 0–1.3)
MONOCYTES NFR BLD AUTO: 7 %
NEUTROPHILS # BLD AUTO: 7.7 10E3/UL (ref 1.6–8.3)
NEUTROPHILS NFR BLD AUTO: 67 %
NRBC # BLD AUTO: 0.2 10E3/UL
NRBC BLD AUTO-RTO: 2 /100
PLATELET # BLD AUTO: 383 10E3/UL (ref 150–450)
RBC # BLD AUTO: 2.31 10E6/UL (ref 3.8–5.2)
RSV RNA SPEC QL NAA+PROBE: NOT DETECTED
RSV RNA SPEC QL NAA+PROBE: NOT DETECTED
RV+EV RNA SPEC QL NAA+PROBE: NOT DETECTED
SARS-COV-2 RNA RESP QL NAA+PROBE: NEGATIVE
WBC # BLD AUTO: 11.6 10E3/UL (ref 4–11)

## 2024-02-05 PROCEDURE — 250N000013 HC RX MED GY IP 250 OP 250 PS 637: Performed by: PEDIATRICS

## 2024-02-05 PROCEDURE — 96361 HYDRATE IV INFUSION ADD-ON: CPT

## 2024-02-05 PROCEDURE — 86923 COMPATIBILITY TEST ELECTRIC: CPT | Performed by: REGISTERED NURSE

## 2024-02-05 PROCEDURE — 87635 SARS-COV-2 COVID-19 AMP PRB: CPT

## 2024-02-05 PROCEDURE — 96374 THER/PROPH/DIAG INJ IV PUSH: CPT

## 2024-02-05 PROCEDURE — 99215 OFFICE O/P EST HI 40 MIN: CPT | Performed by: REGISTERED NURSE

## 2024-02-05 PROCEDURE — G0463 HOSPITAL OUTPT CLINIC VISIT: HCPCS | Mod: 25

## 2024-02-05 PROCEDURE — 86900 BLOOD TYPING SEROLOGIC ABO: CPT

## 2024-02-05 PROCEDURE — 250N000011 HC RX IP 250 OP 636: Performed by: PEDIATRICS

## 2024-02-05 PROCEDURE — 36591 DRAW BLOOD OFF VENOUS DEVICE: CPT

## 2024-02-05 PROCEDURE — 99417 PROLNG OP E/M EACH 15 MIN: CPT | Performed by: REGISTERED NURSE

## 2024-02-05 PROCEDURE — 96376 TX/PRO/DX INJ SAME DRUG ADON: CPT

## 2024-02-05 PROCEDURE — 85025 COMPLETE CBC W/AUTO DIFF WBC: CPT

## 2024-02-05 PROCEDURE — 258N000003 HC RX IP 258 OP 636: Performed by: PEDIATRICS

## 2024-02-05 PROCEDURE — 87581 M.PNEUMON DNA AMP PROBE: CPT | Performed by: REGISTERED NURSE

## 2024-02-05 PROCEDURE — 96375 TX/PRO/DX INJ NEW DRUG ADDON: CPT

## 2024-02-05 RX ORDER — HEPARIN SODIUM,PORCINE 10 UNIT/ML
5 VIAL (ML) INTRAVENOUS
Status: CANCELLED | OUTPATIENT
Start: 2024-04-01

## 2024-02-05 RX ORDER — KETOROLAC TROMETHAMINE 30 MG/ML
30 INJECTION, SOLUTION INTRAMUSCULAR; INTRAVENOUS ONCE
Status: CANCELLED
Start: 2024-04-01 | End: 2024-04-01

## 2024-02-05 RX ORDER — ONDANSETRON 2 MG/ML
8 INJECTION INTRAMUSCULAR; INTRAVENOUS EVERY 6 HOURS PRN
Status: CANCELLED
Start: 2024-04-01

## 2024-02-05 RX ORDER — HEPARIN SODIUM (PORCINE) LOCK FLUSH IV SOLN 100 UNIT/ML 100 UNIT/ML
5 SOLUTION INTRAVENOUS
Status: CANCELLED | OUTPATIENT
Start: 2024-04-01

## 2024-02-05 RX ORDER — ONDANSETRON 2 MG/ML
8 INJECTION INTRAMUSCULAR; INTRAVENOUS EVERY 6 HOURS PRN
Status: DISCONTINUED | OUTPATIENT
Start: 2024-02-05 | End: 2024-02-05 | Stop reason: HOSPADM

## 2024-02-05 RX ORDER — ONDANSETRON 4 MG/1
8 TABLET, FILM COATED ORAL
Status: CANCELLED
Start: 2024-04-01

## 2024-02-05 RX ORDER — DIPHENHYDRAMINE HCL 25 MG
50 CAPSULE ORAL
Status: CANCELLED
Start: 2024-04-01

## 2024-02-05 RX ORDER — KETOROLAC TROMETHAMINE 30 MG/ML
30 INJECTION, SOLUTION INTRAMUSCULAR; INTRAVENOUS ONCE
Status: COMPLETED | OUTPATIENT
Start: 2024-02-05 | End: 2024-02-05

## 2024-02-05 RX ORDER — DIPHENHYDRAMINE HCL 25 MG
50 CAPSULE ORAL
Status: COMPLETED | OUTPATIENT
Start: 2024-02-05 | End: 2024-02-05

## 2024-02-05 RX ADMIN — HYDROMORPHONE HYDROCHLORIDE 1 MG: 1 INJECTION, SOLUTION INTRAMUSCULAR; INTRAVENOUS; SUBCUTANEOUS at 15:02

## 2024-02-05 RX ADMIN — SODIUM CHLORIDE, POTASSIUM CHLORIDE, SODIUM LACTATE AND CALCIUM CHLORIDE 1000 ML: 600; 310; 30; 20 INJECTION, SOLUTION INTRAVENOUS at 13:09

## 2024-02-05 RX ADMIN — HYDROMORPHONE HYDROCHLORIDE 1 MG: 1 INJECTION, SOLUTION INTRAMUSCULAR; INTRAVENOUS; SUBCUTANEOUS at 13:14

## 2024-02-05 RX ADMIN — ONDANSETRON 8 MG: 2 INJECTION INTRAMUSCULAR; INTRAVENOUS at 13:09

## 2024-02-05 RX ADMIN — HYDROMORPHONE HYDROCHLORIDE 1 MG: 1 INJECTION, SOLUTION INTRAMUSCULAR; INTRAVENOUS; SUBCUTANEOUS at 14:03

## 2024-02-05 RX ADMIN — DIPHENHYDRAMINE HYDROCHLORIDE 50 MG: 25 CAPSULE ORAL at 13:10

## 2024-02-05 RX ADMIN — KETOROLAC TROMETHAMINE 30 MG: 30 INJECTION, SOLUTION INTRAMUSCULAR; INTRAVENOUS at 13:10

## 2024-02-05 ASSESSMENT — PAIN SCALES - GENERAL: PAINLEVEL: EXTREME PAIN (9)

## 2024-02-05 NOTE — PROGRESS NOTES
"Infusion Nursing Note:  Jennifer Cervantes presents today for add on infusion.    Patient seen by provider today: Yes: Patricia Mantilla CNP   present during visit today: Not Applicable.    Note: Patient add on infusion for IVF/pain medication. Patient reports feeling \"foggy\", SOB, ongoing fever, and low back pain rated 8/10. Jennifer states she was in the ED on Saturday for low back pain and since discharge has noted fevers, nasal congestion, increased SOB, loss of taste, and increased fatigue. ANDREAS notified. Home medications and allergies reviewed. Per patient took tylenol, ibuprofen, and Zofran last night. Oxycodone taken at 0600 today.  Assessment completed by ANDREAS. Received 1L LR over 2 hours, benadryl 50 mg PO, Zofran 8 mg IVP, Toradol 30 mg IVP, and dilaudid 1 mg IVP every 1 hour x 3 doses.       Intravenous Access:  Implanted Port.    Treatment Conditions:  Lab Results   Component Value Date    HGB 7.0 (L) 02/05/2024    WBC 11.6 (H) 02/05/2024    ANEU 9.9 (H) 11/29/2023    ANEUTAUTO 7.7 02/05/2024     02/05/2024            Post Infusion Assessment:  Patient tolerated infusion without incident.  Blood return noted pre and post infusion.  Site patent and intact, free from redness, edema or discomfort.  No evidence of extravasations.  Access discontinued per protocol.       Discharge Plan:   AVS to patient via MYCHART.    Patient discharged in stable condition accompanied by: self.  Departure Mode: Ambulatory.      Jennifer Lai RN    "

## 2024-02-05 NOTE — PROGRESS NOTES
Social Work - Transportation  [facility]    Data/Intervention:  Patient Name: Jennifer Cervantes Goes By: Jennifer    /Age: 2000 (23 year old)    Referral From: [facility]  Reason for Referral:  support requested for patient transportation needs for today's appointment.  Assessment:  called [employer] to arrange ride. [employer] arranged round trip ride with will call return. Patient will need to call [phone] when ready for return ride home.  Plan: Patient is aware of the transportation plan.  available to assist with any other needs.    CARLOS Chavez,LGSW  Hematology/Oncology Social Worker  Phone:[phone] Pager: [phone]

## 2024-02-05 NOTE — TELEPHONE ENCOUNTER
BMT can offer apt to Jennifer at 1300.    Sees Patricia today at 1345; Jennifer confirms she can come at that time and is asking if Patricia can see her in infusion center.  Secure chat sent to Patricia who agrees to see pt in infusion.    Message sent to [facility] to schedule in BMT at 1300 today; Provider will see pt in infusion.  Message sent to  to assist with transportation.    Updated Charge Nurse.    Message routed to Care Team.

## 2024-02-05 NOTE — PROGRESS NOTES
[facility]: Cancer Care                                                                                          Completed chart audit to update Oncology Care Coordination enrollment status.  Reviewed POC and pt has appropriate follow up scheduled for ANDREAS and infusion today.        Signature:  Arely Krueger RN

## 2024-02-05 NOTE — Clinical Note
"    2/5/2024         RE: Jennifer Cervantes  [address]  [facility] [place] [address]        Dear Colleague,    Thank you for referring your patient, Jennifer Cervantes, to the [facility]. Please see a copy of my visit note below.    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 She had multiple thromboembolic events in 2021 despite adherent anticoagulation use (though warfarin was perpetually low) and there are concerns for chronic thromboembolic disease but did not have pulmonary HTN on a November 2021 cath. She is maintained on chronic PO opioids and twice-weekly infusion visits (since 1/24/22) but has been able to be maintained on this regimen and has stayed out of the ED most of the time with even rarer admissions.     Interval History:  ***      Sickle Cell Disease Comprehensive Checklist  Bone Health/Avascular Necrosis Screening/Symptoms (each visit): no new concerns today  Leg Ulcer evaluation (every visit): none  Hypertension (every visit):stable 11/3/23  Last pulmonary evaluation (asthma, AMAN, pulm HTN): 9/28/22, due for follow-up  Stroke/silent cerebral infarct Hx (Y/N): Yes TIA ~2014, first event ~age 1 with full stroke and R sided weakness  Last PCP Visit: 3/6/23  Vaccines:  PCV13: 5/13/19  Pneumovax (PPSV23): 3/04, 10/09, 7/12/19 (next due 7/2024)  Menactra: 4/2010, 9/2015 (MCV done 8/16/21)  MenB: 9/16/15, 5/13/19  Influenza: 10/2/23  Audiology (chelation): done 6/2020, normal.. However, on 6/7, \"While there is no " "significant change in grade on the CTCAE 4.03 Scale, there were hearing changes bilaterally for both standard and extended high frequency audiometry.  Will need to determine if an ENT consult is advised due to the asymmetry in extended high frequency testing.\"     Plan last reviewed with patient: 10/2/23    Patient background: 24 yo F, enjoys movies and kids though there are times where she does not really want to talk to people. Does not have a lot of social support at home.     Sickle Cell Disease History  Primary Hematologist Team: Jose Rafael Duncan  PCP: none  Genotype: SS  Acute Pain Crisis Treatment: (limiting IV   ER   Dilaudid 1 mg IV q1h up to 3 doses  Toradol 30 mg IV x1   Maintenance IV fluids with LR  Other: Zofran 8 mg IV PRN nausea  Inpatient:  PCA Dilaudid 1 hr q30 minutes, no basal rate  Toradol 30 mg x6h x 4 hr  LR maintenance x 1-2 days  Other Medications: Zofran  ASA  Supportive Care: Docusate, Senna  Chronic Pain Medications:    Home regimen: oxycodone 15 mg p.o. q.4-6 hours p.r.n. breakthrough pain.  She also continues on Voltaren gel, and Zoloft among other medications.    -Also benefits from mental health visits, acupuncture  Baseline Hemoglobin: 7 g/dl without chronic transfusions  Hydroxyurea use: Yes  H/O blood transfusions: Yes, several (iron overload) Most recent 11/20/2021  H/O Transfusion Reactions: no  Antibodies:none  H/O Acute Chest Syndrome: Yes  Last episode:9/05/22 (previously 4/26/21, 10/2019)   ICU/intubation: not with 9/2022 admission  H/O Stroke: Yes (managed with chronic transfusions in the past, stopped late Spring 2020)  H/O VTE: Yes (2/2021)  H/O Cholecystectomy or Splenectomy: no  H/O Asthma, Pulm HTN, AVN, Leg Ulcers, Nephropathy, Retinopathy, etc: Iron overload, asthma, chronic lung disease, physical limitations from early stroke    ---------------------------------------  Jennifer Cervantes's Goals (did not discuss today)    1-3 month goal:  Look for a new job in January 6 " month goal:  Save money for ultimate goal of moving out    12 month goal:  Move into her own place     Disease-specific goal(s):  Decrease frequency of ED and infusion center visits.  ---------------------------------------      Current Outpatient Medications   Medication Sig Dispense Refill    acetaminophen (TYLENOL) 325 MG tablet Take 2 tablets (650 mg) by mouth every 6 hours as needed for mild pain 120 tablet 3    albuterol (PROAIR HFA/PROVENTIL HFA/VENTOLIN HFA) 108 (90 Base) MCG/ACT inhaler Inhale 2 puffs into the lungs every 6 hours as needed for shortness of breath or wheezing 8.5 g 3    albuterol (PROVENTIL) (2.5 MG/3ML) 0.083% neb solution Take 2 vials (5 mg) by nebulization every 6 hours as needed for shortness of breath or wheezing 90 mL 3    aspirin (ASA) 81 MG chewable tablet Take 1 tablet (81 mg) by mouth 2 times daily 60 tablet 11    budesonide-formoterol (SYMBICORT) 160-4.5 MCG/ACT Inhaler Inhale 2 puffs into the lungs 2 times daily 10.2 g 3    cetirizine (ZYRTEC) 10 MG tablet Take 1 tablet (10 mg) by mouth daily 30 tablet 1    deferasirox (JADENU) 360 MG tablet Take 4 tablets (1,440 mg) by mouth every evening 120 tablet 4    diphenhydrAMINE (BENADRYL) 25 MG capsule Take 1 capsule (25 mg) by mouth every 6 hours as needed for itching or allergies 30 capsule 0    EPINEPHrine (ANY BX GENERIC EQUIV) 0.3 MG/0.3ML injection 2-pack Inject 0.3 mLs (0.3 mg) into the muscle as needed for anaphylaxis 1 each 1    Hydroxyurea 1000 MG TABS Take 3,000 mg by mouth daily 90 tablet 3    methocarbamol (ROBAXIN) 750 MG tablet Take 1 tablet (750 mg) by mouth 4 times daily as needed for muscle spasms (during sickle pain crises. Okay to take scheduled while in pain) 60 tablet 1    naloxone (NARCAN) 4 MG/0.1ML nasal spray Spray 4 mg into one nostril alternating nostrils as needed for opioid reversal every 2-3 minutes until assistance arrives      omeprazole (PRILOSEC) 20 MG DR capsule Take 1 capsule (20 mg) by mouth daily  30 capsule 0    ondansetron (ZOFRAN) 8 MG tablet Take 1 tablet (8 mg) by mouth every 8 hours as needed for nausea 30 tablet 1    oxyCODONE IR (ROXICODONE) 10 MG tablet Take 1 tablet (10 mg) by mouth every 4 hours as needed for severe pain or breakthrough pain Goal 4 per day. Max 6 per day. 20 tablet 0       Past Medical History  Past Medical History:   Diagnosis Date    Anxiety     Bleeding disorder (H24)     Blood clotting disorder (H24)     Cerebral infarction (H) 2015    Cognitive developmental delay     low IQ. 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 side (H)     right hand contractures    Iron overload due to repeated red blood cell transfusions     Migraines     Multiple subsegmental pulmonary emboli without acute cor pulmonale (H) 02/01/2021    Oppositional defiant behavior     Presence of intrauterine contraceptive device 2/18/2020    Superficial venous thrombosis of arm, right 03/25/2021    Uncomplicated asthma      Past Surgical History:   Procedure Laterality Date    AS INSERT TUNNELED CV 2 CATH W/O PORT/PUMP      CHOLECYSTECTOMY      CV RIGHT HEART CATH MEASUREMENTS RECORDED N/A 11/18/2021    Procedure: Right Heart Cath;  Surgeon: Jackson Stauffer MD;  Location:  HEART CARDIAC CATH LAB    INSERT PORT VASCULAR ACCESS Left 4/21/2021    Procedure: INSERTION, VASCULAR ACCESS PORT (NOT SURE ON SIDE UNTIL REMOVAL);  Surgeon: Rajan More MD;  Location: [facility] [place]    IR CHEST PORT PLACEMENT > 5 YRS OF AGE  4/21/2021    IR CVC NON TUNNEL LINE REMOVAL  5/6/2021    IR CVC NON TUNNEL PLACEMENT > 5 YRS  04/07/2020    IR CVC NON TUNNEL PLACEMENT > 5 YRS  4/30/2021    IR CVC NON TUNNEL PLACEMENT > 5 YRS  9/7/2022    IR PORT REMOVAL LEFT  4/21/2021    REMOVE PORT VASCULAR ACCESS Left 4/21/2021    Procedure: REMOVAL, VASCULAR ACCESS PORT LEFT;  Surgeon: Rajan More MD;  Location: [facility] [place]     REPAIR TENDON ELBOW Right 10/02/2019    Procedure: Right Elbow Flexor Lengthening, Flexor Pronator Slide Of Wrist and Finger, Thumb Adductor Lengthening;  Surgeon: Anai Franco MD;  Location: [facility] OR    TONSILLECTOMY Bilateral 10/02/2019    Procedure: Bilateral Tonsillectomy;  Surgeon: Farhana Guy MD;  Location:  OR    ZC BREAST REDUCTION (INCLUDES LIPO) TIER 3 Bilateral 04/23/2019     Allergies   Allergen Reactions    Contrast Dye      Hives and breathing issues    Fish-Derived Products Hives    Seafood Hives    Adhesive Tape Hives     Primipore dressing causes hives    Gadolinium     Iodinated Contrast Media      Social History   Social History     Tobacco Use    Smoking status: Never     Passive exposure: Never    Smokeless tobacco: Never   Substance Use Topics    Alcohol use: Not Currently     Alcohol/week: 0.0 standard drinks of alcohol    Drug use: Never    Still living at home with mom and extended family.   Past medical history and social history were reviewed.    Physical Examination:  There were no vitals taken for this visit.     Wt Readings from Last 10 Encounters:   01/26/24 66.2 kg (145 lb 14.4 oz)   01/12/24 66.2 kg (146 lb)   01/06/24 67.6 kg (149 lb)   01/04/24 67.9 kg (149 lb 12.8 oz)   12/28/23 68 kg (149 lb 14.4 oz)   12/22/23 68 kg (150 lb)   12/20/23 67 kg (147 lb 11.2 oz)   12/08/23 68 kg (150 lb)   12/01/23 66.9 kg (147 lb 6.4 oz)   11/29/23 67.1 kg (148 lb)     General: Pleasant female, NAD  Eyes: EOMI, PERRL. Mild. scleral icterus.  Respiratory:Normal respiratory effort  GI:   Ext: No peripheral edema.  MSK: Contractured right arm and hand 2/2 stoke.  Neurologic: Grossly nonfocal. A/O x 4.  Skin: No rashes, petechiae, or bruising noted on exposed skin. Port accessed in left chest    Laboratory Data:  Most Recent 3 CBC's:  Recent Labs   Lab Test 01/26/24  1105 01/12/24  1222 01/06/24  0353   WBC 14.4* 13.8* 11.4*   HGB 7.9* 7.6* 7.1*   MCV 86 85 86    455*  430   ANEUTAUTO 10.6* 8.6* 7.6    Most Recent 3 BMP's:  Recent Labs   Lab Test 01/26/24  1105 01/12/24  1222 01/06/24  0353 12/16/23  0301 12/08/23  0246 12/03/23  0550    138 138   < > 138 139   POTASSIUM 3.9 4.4 3.8   < > 3.7 4.4   CHLORIDE 104 104 103   < > 105 104   CO2 22 24 25   < > 25 25   BUN 9.3 8.3 9.5   < > 9.9 12.4   CR 0.53 0.56 0.50*   < > 0.47* 0.58   ANIONGAP 11 10 10   < > 8 10   MICAH 9.2 9.3 8.9   < > 8.6 8.9   GLC 92 95 83   < > 100* 91   PROTTOTAL 7.8  --   --   --  7.1 7.4   ALBUMIN 4.6  --   --   --  4.5 4.5    < > = values in this interval not displayed.    Most Recent 2 LFT's:  Recent Labs   Lab Test 01/26/24  1105 12/08/23  0246   AST 49* 49*   ALT 21 23   ALKPHOS 63 65   BILITOTAL 3.9* 2.5*    Most Recent TSH and T4:No lab results found.  Phos/Mag:  Lab Results   Component Value Date    PHOS 4.8 (H) 05/13/2023    PHOS 5.0 (H) 05/12/2023    PHOS 3.6 02/21/2021    MAG 2.0 11/11/2021    MAG 1.7 02/21/2021    MAG 2.1 02/02/2021        I reviewed the above labs today.     Assessment and Plan:  1. Sickle Cell HgbSS Disease  2. Abdominal pain  3. Abdominal distention  4. Nausea/vomiting   Increase in episodes of vomiting associated with nausea, fatigue and abdominal bloating over the past 1+ month. She has history of cholecystectomy, unclear of when this was but she believes it was when she was followed [facility] prior to transfer to the adult clinic at [facility]. Symptoms are similar to her initial presentation prior to surgery from what she recalls but based on her concerns seem to be rather acute-on-chronic in nature. I am less concerned for infectious etiology given the length of time she has been experiencing symptoms, lack of fever or systemic symptoms. No concerning elevation in LFT or bilirubin recently but with increase in vomiting episodes and distention will repeat labs this week including lipase and H. Pylori antigen. Of course bilirubin is rather inconclusive due to  chronic elevation with hemolysis s/t SCD. Gastroparesis can be seen in patients with sickle cell disease but would be unusual to present acutely. Due due longstanding history of NSAID and aspirin use, peptic ulcer disease is also on the differential. She is not using antacid medications or PPI which could be considered for symptomatic treatment but will wait to consider this until H. Pylori testing is complete.   -Check CBC with diff, CMP, lipase, H. 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 She had multiple thromboembolic events in 2021 despite adherent anticoagulation use (though warfarin was perpetually low) and there are concerns for chronic thromboembolic disease but did not have pulmonary HTN on a November 2021 cath. She is maintained on chronic PO opioids and twice-weekly infusion visits (since 1/24/22) but has been able to be maintained on this regimen and has stayed out of the ED most of the time with even rarer admissions.     Interval History:  ***      Sickle Cell Disease Comprehensive Checklist  Bone Health/Avascular Necrosis Screening/Symptoms (each visit): no new concerns today  Leg Ulcer evaluation (every visit): none  Hypertension (every visit):stable 11/3/23  Last pulmonary evaluation (asthma, AMAN, pulm HTN): 9/28/22, due for follow-up  Stroke/silent cerebral infarct Hx (Y/N): Yes  "TIA ~2014, first event ~age 1 with full stroke and R sided weakness  Last PCP Visit: 3/6/23  Vaccines:  PCV13: 5/13/19  Pneumovax (PPSV23): 3/04, 10/09, 7/12/19 (next due 7/2024)  Menactra: 4/2010, 9/2015 (MCV done 8/16/21)  MenB: 9/16/15, 5/13/19  Influenza: 10/2/23  Audiology (chelation): done 6/2020, normal.. However, on 6/7, \"While there is no significant change in grade on the CTCAE 4.03 Scale, there were hearing changes bilaterally for both standard and extended high frequency audiometry.  Will need to determine if an ENT consult is advised due to the asymmetry in extended high frequency testing.\"     Plan last reviewed with patient: 10/2/23    Patient background: 22 yo F, enjoys movies and kids though there are times where she does not really want to talk to people. Does not have a lot of social support at home.     Sickle Cell Disease History  Primary Hematologist Team: Jose Rafael Duncan  PCP: none  Genotype: SS  Acute Pain Crisis Treatment: (limiting IV   ER   Dilaudid 1 mg IV q1h up to 3 doses  Toradol 30 mg IV x1   Maintenance IV fluids with LR  Other: Zofran 8 mg IV PRN nausea  Inpatient:  PCA Dilaudid 1 hr q30 minutes, no basal rate  Toradol 30 mg x6h x 4 hr  LR maintenance x 1-2 days  Other Medications: Zofran  ASA  Supportive Care: Docusate, Senna  Chronic Pain Medications:    Home regimen: oxycodone 15 mg p.o. q.4-6 hours p.r.n. breakthrough pain.  She also continues on Voltaren gel, and Zoloft among other medications.    -Also benefits from mental health visits, acupuncture  Baseline Hemoglobin: 7 g/dl without chronic transfusions  Hydroxyurea use: Yes  H/O blood transfusions: Yes, several (iron overload) Most recent 11/20/2021  H/O Transfusion Reactions: no  Antibodies:none  H/O Acute Chest Syndrome: Yes  Last episode:9/05/22 (previously 4/26/21, 10/2019)   ICU/intubation: not with 9/2022 admission  H/O Stroke: Yes (managed with chronic transfusions in the past, stopped late Spring 2020)  H/O VTE: " Yes (2/2021)  H/O Cholecystectomy or Splenectomy: no  H/O Asthma, Pulm HTN, AVN, Leg Ulcers, Nephropathy, Retinopathy, etc: Iron overload, asthma, chronic lung disease, physical limitations from early stroke    ---------------------------------------  Jennifer Cervantes's Goals (did not discuss today)    1-3 month goal:  Look for a new job in January 6 month goal:  Save money for ultimate goal of moving out    12 month goal:  Move into her own place     Disease-specific goal(s):  Decrease frequency of ED and infusion center visits.  ---------------------------------------      Current Outpatient Medications   Medication Sig Dispense Refill     acetaminophen (TYLENOL) 325 MG tablet Take 2 tablets (650 mg) by mouth every 6 hours as needed for mild pain 120 tablet 3     albuterol (PROAIR HFA/PROVENTIL HFA/VENTOLIN HFA) 108 (90 Base) MCG/ACT inhaler Inhale 2 puffs into the lungs every 6 hours as needed for shortness of breath or wheezing 8.5 g 3     albuterol (PROVENTIL) (2.5 MG/3ML) 0.083% neb solution Take 2 vials (5 mg) by nebulization every 6 hours as needed for shortness of breath or wheezing 90 mL 3     aspirin (ASA) 81 MG chewable tablet Take 1 tablet (81 mg) by mouth 2 times daily 60 tablet 11     budesonide-formoterol (SYMBICORT) 160-4.5 MCG/ACT Inhaler Inhale 2 puffs into the lungs 2 times daily 10.2 g 3     cetirizine (ZYRTEC) 10 MG tablet Take 1 tablet (10 mg) by mouth daily 30 tablet 1     deferasirox (JADENU) 360 MG tablet Take 4 tablets (1,440 mg) by mouth every evening 120 tablet 4     diphenhydrAMINE (BENADRYL) 25 MG capsule Take 1 capsule (25 mg) by mouth every 6 hours as needed for itching or allergies 30 capsule 0     EPINEPHrine (ANY BX GENERIC EQUIV) 0.3 MG/0.3ML injection 2-pack Inject 0.3 mLs (0.3 mg) into the muscle as needed for anaphylaxis 1 each 1     Hydroxyurea 1000 MG TABS Take 3,000 mg by mouth daily 90 tablet 3     methocarbamol (ROBAXIN) 750 MG tablet Take 1 tablet (750 mg) by mouth 4  times daily as needed for muscle spasms (during sickle pain crises. Okay to take scheduled while in pain) 60 tablet 1     naloxone (NARCAN) 4 MG/0.1ML nasal spray Spray 4 mg into one nostril alternating nostrils as needed for opioid reversal every 2-3 minutes until assistance arrives       omeprazole (PRILOSEC) 20 MG DR capsule Take 1 capsule (20 mg) by mouth daily 30 capsule 0     ondansetron (ZOFRAN) 8 MG tablet Take 1 tablet (8 mg) by mouth every 8 hours as needed for nausea 30 tablet 1     oxyCODONE IR (ROXICODONE) 10 MG tablet Take 1 tablet (10 mg) by mouth every 4 hours as needed for severe pain or breakthrough pain Goal 4 per day. Max 6 per day. 20 tablet 0       Past Medical History  Past Medical History:   Diagnosis Date     Anxiety      Bleeding disorder (H24)      Blood clotting disorder (H24)      Cerebral infarction (H) 2015     Cognitive developmental delay     low IQ. Please recognize when managing pain and planning with her     Depressive disorder      Hemiplegia and hemiparesis following cerebral infarction affecting right dominant side (H)     right hand contractures     Iron overload due to repeated red blood cell transfusions      Migraines      Multiple subsegmental pulmonary emboli without acute cor pulmonale (H) 02/01/2021     Oppositional defiant behavior      Presence of intrauterine contraceptive device 2/18/2020     Superficial venous thrombosis of arm, right 03/25/2021     Uncomplicated asthma      Past Surgical History:   Procedure Laterality Date     AS INSERT TUNNELED CV 2 CATH W/O PORT/PUMP       CHOLECYSTECTOMY       CV RIGHT HEART CATH MEASUREMENTS RECORDED N/A 11/18/2021    Procedure: Right Heart Cath;  Surgeon: Jackson Stauffer MD;  Location:  HEART CARDIAC CATH LAB     INSERT PORT VASCULAR ACCESS Left 4/21/2021    Procedure: INSERTION, VASCULAR ACCESS PORT (NOT SURE ON SIDE UNTIL REMOVAL);  Surgeon: Rajan More MD;  Location: [facility] OR     IR CHEST PORT PLACEMENT > 5 YRS OF  AGE  4/21/2021     IR CVC NON TUNNEL LINE REMOVAL  5/6/2021     IR CVC NON TUNNEL PLACEMENT > 5 YRS  04/07/2020     IR CVC NON TUNNEL PLACEMENT > 5 YRS  4/30/2021     IR CVC NON TUNNEL PLACEMENT > 5 YRS  9/7/2022     IR PORT REMOVAL LEFT  4/21/2021     REMOVE PORT VASCULAR ACCESS Left 4/21/2021    Procedure: REMOVAL, VASCULAR ACCESS PORT LEFT;  Surgeon: Rajan More MD;  Location: [facility] OR     REPAIR TENDON ELBOW Right 10/02/2019    Procedure: Right Elbow Flexor Lengthening, Flexor Pronator Slide Of Wrist and Finger, Thumb Adductor Lengthening;  Surgeon: Anai Franco MD;  Location: [facility]     TONSILLECTOMY Bilateral 10/02/2019    Procedure: Bilateral Tonsillectomy;  Surgeon: Farhana Guy MD;  Location: [facility] OR     ZZC BREAST REDUCTION (INCLUDES LIPO) TIER 3 Bilateral 04/23/2019     Allergies   Allergen Reactions     Contrast Dye      Hives and breathing issues     Fish-Derived Products Hives     Seafood Hives     Adhesive Tape Hives     Primipore dressing causes hives     Gadolinium      Iodinated Contrast Media      Social History   Social History     Tobacco Use     Smoking status: Never     Passive exposure: Never     Smokeless tobacco: Never   Substance Use Topics     Alcohol use: Not Currently     Alcohol/week: 0.0 standard drinks of alcohol     Drug use: Never    Still living at home with mom and extended family.   Past medical history and social history were reviewed.    Physical Examination:  There were no vitals taken for this visit.     Wt Readings from Last 10 Encounters:   01/26/24 66.2 kg (145 lb 14.4 oz)   01/12/24 66.2 kg (146 lb)   01/06/24 67.6 kg (149 lb)   01/04/24 67.9 kg (149 lb 12.8 oz)   12/28/23 68 kg (149 lb 14.4 oz)   12/22/23 68 kg (150 lb)   12/20/23 67 kg (147 lb 11.2 oz)   12/08/23 68 kg (150 lb)   12/01/23 66.9 kg (147 lb 6.4 oz)   11/29/23 67.1 kg (148 lb)     General: Pleasant female, NAD  Eyes: EOMI, PERRL. Mild. scleral icterus.  Respiratory:Normal  respiratory effort  GI:   Ext: No peripheral edema.  MSK: Contractured right arm and hand 2/2 stoke.  Neurologic: Grossly nonfocal. A/O x 4.  Skin: No rashes, petechiae, or bruising noted on exposed skin. Port accessed in left chest    Laboratory Data:  Most Recent 3 CBC's:  Recent Labs   Lab Test 01/26/24  1105 01/12/24  1222 01/06/24  0353   WBC 14.4* 13.8* 11.4*   HGB 7.9* 7.6* 7.1*   MCV 86 85 86    455* 430   ANEUTAUTO 10.6* 8.6* 7.6    Most Recent 3 BMP's:  Recent Labs   Lab Test 01/26/24  1105 01/12/24  1222 01/06/24  0353 12/16/23  0301 12/08/23  0246 12/03/23  0550    138 138   < > 138 139   POTASSIUM 3.9 4.4 3.8   < > 3.7 4.4   CHLORIDE 104 104 103   < > 105 104   CO2 22 24 25   < > 25 25   BUN 9.3 8.3 9.5   < > 9.9 12.4   CR 0.53 0.56 0.50*   < > 0.47* 0.58   ANIONGAP 11 10 10   < > 8 10   MICAH 9.2 9.3 8.9   < > 8.6 8.9   GLC 92 95 83   < > 100* 91   PROTTOTAL 7.8  --   --   --  7.1 7.4   ALBUMIN 4.6  --   --   --  4.5 4.5    < > = values in this interval not displayed.    Most Recent 2 LFT's:  Recent Labs   Lab Test 01/26/24  1105 12/08/23  0246   AST 49* 49*   ALT 21 23   ALKPHOS 63 65   BILITOTAL 3.9* 2.5*    Most Recent TSH and T4:No lab results found.  Phos/Mag:  Lab Results   Component Value Date    PHOS 4.8 (H) 05/13/2023    PHOS 5.0 (H) 05/12/2023    PHOS 3.6 02/21/2021    MAG 2.0 11/11/2021    MAG 1.7 02/21/2021    MAG 2.1 02/02/2021        I reviewed the above labs today.     Assessment and Plan:  1. Sickle Cell HgbSS Disease  2. Abdominal pain  3. Abdominal distention  4. Nausea/vomiting   Increase in episodes of vomiting associated with nausea, fatigue and abdominal bloating over the past 1+ month. She has history of cholecystectomy, unclear of when this was but she believes it was when she was followed [facility] prior to transfer to the adult clinic at [facility]. Symptoms are similar to her initial presentation prior to surgery from what she recalls but based on her  concerns seem to be rather acute-on-chronic in nature. I am less concerned for infectious etiology given the length of time she has been experiencing symptoms, lack of fever or systemic symptoms. No concerning elevation in LFT or bilirubin recently but with increase in vomiting episodes and distention will repeat labs this week including lipase and H. Pylori antigen. Of course bilirubin is rather inconclusive due to chronic elevation with hemolysis s/t SCD. Gastroparesis can be seen in patients with sickle cell disease but would be unusual to present acutely. Due due longstanding history of NSAID and aspirin use, peptic ulcer disease is also on the differential. She is not using antacid medications or PPI which could be considered for symptomatic treatment but will wait to consider this until H. Pylori testing is complete.   -Check CBC with diff, CMP, lipase, H. pylori stool antigen    *** minutes spent on the date of the encounter doing {2021 E&M time in:399470}     Patricia Mantilla CNP        Again, thank you for allowing me to participate in the care of your patient.        Sincerely,        Patricia Mantilla CNP

## 2024-02-05 NOTE — TELEPHONE ENCOUNTER
Oncology Nurse Triage - Sickle Cell Pain Crisis:    Situation: Jennifer  calling about Sickle Cell Pain Crisis, requesting to be added on for IV fluids and pain medicine    Background:     Patient's last infusion was 2/1/24   Last clinic visit date:1/22/24 Patricia Mantilla   Does patient have active treatment plan?  Yes      Assessment of Symptoms:  Onset/Duration of symptoms: 2 day    Is it typical sickle cell pain? Yes   Location: lower back   Character: Sharp           Intensity: 10/10    Any radiation of pain, numbness, tingling, weakness, warmth, swelling, discoloration of arms or legs?No     Fever?No  (if yes max temperature recorded in last 24 hours):      Chest Pain Present: No     Shortness of breath: No     Other home therapies tried: HEAT/HEATING PAD and WARM BATH     Last home medication taken and when: oxycodone at 6am     Any Refills Needed?: No     Does patient have transportation & length of time to get to clinic: No needs transportation to the clinic         Recommendations:     Placed on infusion call list     If you do not hear from the infusion center by 2pm then you will not be able to get in for an infusion today. If symptoms worsen while waiting for call back, and/or you experience fever, chills, SOB, chest pain, cough, n/v, dizziness, numbness, swelling, discoloration of extremities, then seek emergency evaluation in Emergency Department.     Please note, if you are late for your appt, you risk losing your infusion appt as it may delay another patient's infusion who arrived on time.

## 2024-02-06 ENCOUNTER — TELEPHONE (OUTPATIENT)
Dept: ONCOLOGY | Facility: CLINIC | Age: 24
End: 2024-02-06
Payer: COMMERCIAL

## 2024-02-06 NOTE — TELEPHONE ENCOUNTER
Jennifer calling,     If cannot get in for blood transfusion for Thursday 2/8/2024 , pt would also be available for a Friday blood transfusion 2/9/2024.     Pt is still waiting for update on appt plans for later this week.     This writer informed Jennifer will forward inquiry to care team.

## 2024-02-07 ENCOUNTER — PATIENT OUTREACH (OUTPATIENT)
Dept: ONCOLOGY | Facility: CLINIC | Age: 24
End: 2024-02-07

## 2024-02-07 LAB
BLD PROD TYP BPU: NORMAL
BLOOD COMPONENT TYPE: NORMAL
CODING SYSTEM: NORMAL
CROSSMATCH: NORMAL
ISSUE DATE AND TIME: NORMAL
UNIT ABO/RH: NORMAL
UNIT NUMBER: NORMAL
UNIT STATUS: NORMAL
UNIT TYPE ISBT: 9500

## 2024-02-07 RX ORDER — HEPARIN SODIUM,PORCINE 10 UNIT/ML
5 VIAL (ML) INTRAVENOUS
Status: CANCELLED | OUTPATIENT
Start: 2024-02-07

## 2024-02-07 RX ORDER — HEPARIN SODIUM (PORCINE) LOCK FLUSH IV SOLN 100 UNIT/ML 100 UNIT/ML
5 SOLUTION INTRAVENOUS
Status: CANCELLED | OUTPATIENT
Start: 2024-02-07

## 2024-02-07 NOTE — PROGRESS NOTES
[facility]: Cancer Care Follow-Up Note                                    Discussion with Patient:                                                      Called pt to make sure she knew transfusion schedule tomorrow. Pt stated understanding.          Goals          General     Pain Management (pt-stated)      Notes - Note created  10/13/2023  3:51 PM by Arely Krueger RN     Goal Statement: I will establish a plan for preventing and managing pain.  Date Goal set: 10/13/2023  Barriers: disease burden, multiple diagnoses, and transportation  Strengths: motivation, health awareness, and involvement with care team  Date to Achieve By: ongoing  Patient expressed understanding of goal: Yes  Action steps to achieve this goal:  I will take medications as prescribed.   I will call triage with new or worsening pain not controlled by medication.   I will use alternative therapies as directed. (Ice, heat, massage, etc)   I will establish care will palliative care department for ongoing pain management as needed.   I will call triage for medication refills when there are 2-3 days of medication remaining.                 Dates of Treatment:                                                      Infusion given in last 28 days       None            Assessment:                                                        Plan of Care Education Review:   Assessment completed with:: Patient    Plan of Care Education   Plan of Care:: Treatment schedule  Procedure education provided for: : Blood product transfusion    Evaluation of Learning  Patient Education Provided: Yes  Readiness:: Acceptance  Method:: Explanation  Response:: Verbalizes understanding           Intervention/Education provided during outreach:                                                         Patient to follow up as scheduled at next appt  Patient to call/[employer] message with updates  Confirmed patient has clinic and triage numbers    Signature:  Arely Krueger RN

## 2024-02-08 ENCOUNTER — TELEPHONE (OUTPATIENT)
Dept: PULMONOLOGY | Facility: CLINIC | Age: 24
End: 2024-02-08

## 2024-02-08 ENCOUNTER — INFUSION THERAPY VISIT (OUTPATIENT)
Dept: INFUSION THERAPY | Facility: CLINIC | Age: 24
End: 2024-02-08
Attending: PEDIATRICS
Payer: COMMERCIAL

## 2024-02-08 ENCOUNTER — NURSE TRIAGE (OUTPATIENT)
Dept: ONCOLOGY | Facility: CLINIC | Age: 24
End: 2024-02-08
Payer: COMMERCIAL

## 2024-02-08 VITALS
TEMPERATURE: 97.6 F | HEART RATE: 76 BPM | SYSTOLIC BLOOD PRESSURE: 118 MMHG | OXYGEN SATURATION: 94 % | RESPIRATION RATE: 16 BRPM | DIASTOLIC BLOOD PRESSURE: 73 MMHG

## 2024-02-08 DIAGNOSIS — D57.1 HB-SS DISEASE WITHOUT CRISIS (H): Primary | ICD-10-CM

## 2024-02-08 DIAGNOSIS — G81.10 SPASTIC HEMIPLEGIA, UNSPECIFIED ETIOLOGY, UNSPECIFIED LATERALITY (H): ICD-10-CM

## 2024-02-08 DIAGNOSIS — D57.00 SICKLE CELL PAIN CRISIS (H): ICD-10-CM

## 2024-02-08 DIAGNOSIS — Z86.73 HISTORY OF STROKE: ICD-10-CM

## 2024-02-08 PROCEDURE — 250N000011 HC RX IP 250 OP 636: Performed by: PEDIATRICS

## 2024-02-08 PROCEDURE — 36430 TRANSFUSION BLD/BLD COMPNT: CPT

## 2024-02-08 PROCEDURE — 96376 TX/PRO/DX INJ SAME DRUG ADON: CPT

## 2024-02-08 PROCEDURE — 250N000013 HC RX MED GY IP 250 OP 250 PS 637: Performed by: PEDIATRICS

## 2024-02-08 PROCEDURE — 96375 TX/PRO/DX INJ NEW DRUG ADDON: CPT

## 2024-02-08 PROCEDURE — P9016 RBC LEUKOCYTES REDUCED: HCPCS | Performed by: REGISTERED NURSE

## 2024-02-08 PROCEDURE — 258N000003 HC RX IP 258 OP 636: Performed by: PEDIATRICS

## 2024-02-08 PROCEDURE — 96374 THER/PROPH/DIAG INJ IV PUSH: CPT

## 2024-02-08 RX ORDER — DIPHENHYDRAMINE HCL 25 MG
50 CAPSULE ORAL
Status: CANCELLED
Start: 2024-04-01

## 2024-02-08 RX ORDER — ONDANSETRON 2 MG/ML
8 INJECTION INTRAMUSCULAR; INTRAVENOUS EVERY 6 HOURS PRN
Status: DISCONTINUED | OUTPATIENT
Start: 2024-02-08 | End: 2024-02-08 | Stop reason: HOSPADM

## 2024-02-08 RX ORDER — DIPHENHYDRAMINE HCL 25 MG
50 CAPSULE ORAL
Status: COMPLETED | OUTPATIENT
Start: 2024-02-08 | End: 2024-02-08

## 2024-02-08 RX ORDER — KETOROLAC TROMETHAMINE 30 MG/ML
30 INJECTION, SOLUTION INTRAMUSCULAR; INTRAVENOUS ONCE
Status: COMPLETED | OUTPATIENT
Start: 2024-02-08 | End: 2024-02-08

## 2024-02-08 RX ORDER — HEPARIN SODIUM,PORCINE 10 UNIT/ML
5 VIAL (ML) INTRAVENOUS
Status: CANCELLED | OUTPATIENT
Start: 2024-04-01

## 2024-02-08 RX ORDER — ONDANSETRON 2 MG/ML
8 INJECTION INTRAMUSCULAR; INTRAVENOUS EVERY 6 HOURS PRN
Status: CANCELLED
Start: 2024-04-01

## 2024-02-08 RX ORDER — HEPARIN SODIUM (PORCINE) LOCK FLUSH IV SOLN 100 UNIT/ML 100 UNIT/ML
5 SOLUTION INTRAVENOUS
Status: DISCONTINUED | OUTPATIENT
Start: 2024-02-08 | End: 2024-02-08 | Stop reason: HOSPADM

## 2024-02-08 RX ORDER — OXYCODONE HYDROCHLORIDE 10 MG/1
10 TABLET ORAL EVERY 4 HOURS PRN
Qty: 20 TABLET | Refills: 0 | Status: SHIPPED | OUTPATIENT
Start: 2024-02-09 | End: 2024-02-14

## 2024-02-08 RX ORDER — HEPARIN SODIUM (PORCINE) LOCK FLUSH IV SOLN 100 UNIT/ML 100 UNIT/ML
5 SOLUTION INTRAVENOUS
Status: CANCELLED | OUTPATIENT
Start: 2024-04-01

## 2024-02-08 RX ORDER — ONDANSETRON 8 MG/1
8 TABLET, ORALLY DISINTEGRATING ORAL
Status: DISCONTINUED | OUTPATIENT
Start: 2024-02-08 | End: 2024-02-08 | Stop reason: HOSPADM

## 2024-02-08 RX ORDER — ONDANSETRON 4 MG/1
8 TABLET, FILM COATED ORAL
Status: CANCELLED
Start: 2024-04-01

## 2024-02-08 RX ORDER — KETOROLAC TROMETHAMINE 30 MG/ML
30 INJECTION, SOLUTION INTRAMUSCULAR; INTRAVENOUS ONCE
Status: CANCELLED
Start: 2024-04-01 | End: 2024-04-01

## 2024-02-08 RX ADMIN — HYDROMORPHONE HYDROCHLORIDE 1 MG: 1 INJECTION, SOLUTION INTRAMUSCULAR; INTRAVENOUS; SUBCUTANEOUS at 10:28

## 2024-02-08 RX ADMIN — SODIUM CHLORIDE, POTASSIUM CHLORIDE, SODIUM LACTATE AND CALCIUM CHLORIDE 1000 ML: 600; 310; 30; 20 INJECTION, SOLUTION INTRAVENOUS at 12:38

## 2024-02-08 RX ADMIN — HYDROMORPHONE HYDROCHLORIDE 1 MG: 1 INJECTION, SOLUTION INTRAMUSCULAR; INTRAVENOUS; SUBCUTANEOUS at 11:32

## 2024-02-08 RX ADMIN — KETOROLAC TROMETHAMINE 30 MG: 30 INJECTION, SOLUTION INTRAMUSCULAR; INTRAVENOUS at 10:28

## 2024-02-08 RX ADMIN — ONDANSETRON 8 MG: 2 INJECTION INTRAMUSCULAR; INTRAVENOUS at 10:27

## 2024-02-08 RX ADMIN — HYDROMORPHONE HYDROCHLORIDE 1 MG: 1 INJECTION, SOLUTION INTRAMUSCULAR; INTRAVENOUS; SUBCUTANEOUS at 12:42

## 2024-02-08 RX ADMIN — DIPHENHYDRAMINE HYDROCHLORIDE 50 MG: 25 CAPSULE ORAL at 10:26

## 2024-02-08 RX ADMIN — Medication 5 ML: at 14:39

## 2024-02-08 NOTE — TELEPHONE ENCOUNTER
Narcotic Refill Request    Medication(s) requested:  Oxycodone   Person Requesting Refill:Jennifer   What pain is the medication treating: sickle cell pain   How is the medication being taken?:every 6 hours has been taking every 4 hours 6 max per day   Does pt have enough for today? Yes   Is pain being adequately controlled on the current regimen?: yes   Experiencing any side effects from medication?: none     Date of most recent appointment:  2/5/24 Patricia Mantilla   Any No Show Visits: no   Next appointment:   2/23/24 Patricia Mantilla   Last fill date and by whom:  2/1/24 Patricia Mantilla    Reviewed:  No access     Send to provider: Patricia Mantilla

## 2024-02-08 NOTE — TELEPHONE ENCOUNTER
Call received from pt in regards to her appt scheduled tomorrow (2/9/24) with Dr. Harvey-she just tested positive for COVID and was interested in rescheduling this appointment. Next available appt with Dr. Harvey was not until 4/5/24, but per our protocol, appt can be changed to virtual visit due to illness as long as pt is able to complete any required PFT's beforehand. PT is not scheduled to have any PFT's, nor does she have any orders for any.     Virtual visit offered to pt, which she accepted-appt updated.

## 2024-02-08 NOTE — PROGRESS NOTES
Infusion Nursing Note:  Jennifer Cervantes presents today for 1u PRBC, IVF/pain medication.    Patient seen by provider today: No   present during visit today: Not Applicable.    Note:   1 unit PRBC infused over ~1.25 hours  1L LR over 2 hours.  Patient reported generalized pain 10/10 upon arrival to appointment, on discharge reported pain 3/10.    Intravenous Access:  Implanted Port.    Treatment Conditions:  Date of consent or authorization: 8/18/2023  All pertinent labs reviewed prior to infusion: YES  Results reviewed, labs MET treatment parameters, ok to proceed with treatment: Routine, Prepare 1 Units- Transfusion Indications: Hemoglobin 7 g/dL or less for critically ill, clinically stable patients    Latest Reference Range & Units 02/05/24 12:51   Hemoglobin 11.7 - 15.7 g/dL 7.0 (L)     Post Infusion Assessment:  Patient tolerated infusion without incident.  Blood return noted pre and post infusion.  Site patent and intact, free from redness, edema or discomfort.  No evidence of extravasations.  Access discontinued per protocol.     Discharge Plan:   Patient and/or family verbalized understanding of discharge instructions and all questions answered.  AVS to patient via [email or web address].  Patient will return PRN for next appointment.   Patient discharged in stable condition accompanied by: self.  Departure Mode: Ambulatory.    Administrations This Visit       diphenhydrAMINE (BENADRYL) capsule 50 mg       Admin Date  02/08/2024 Action  $Given Dose  50 mg Route  Oral Documented By  Echo Perez RN              heparin 100 unit/mL injection 5 mL       Admin Date  02/08/2024 Action  $Given Dose  5 mL Route  Intracatheter Documented By  Echo Perez RN              HYDROmorphone (DILAUDID) injection 1 mg       Admin Date  02/08/2024 Action  $Given Dose  1 mg Route  Intravenous Documented By  Echo Perez RN               Admin Date  02/08/2024 Action  $Given Dose  1 mg Route  Intravenous Documented  By  Echo Perez RN               Admin Date  02/08/2024 Action  $Given Dose  1 mg Route  Intravenous Documented By  Echo Perez RN              ketorolac (TORADOL) injection 30 mg       Admin Date  02/08/2024 Action  $Given Dose  30 mg Route  Intravenous Documented By  Echo Perez RN              lactated ringers BOLUS 1,000 mL       Admin Date  02/08/2024 Action  $New Bag Dose  1,000 mL Rate  500 mL/hr Route  Intravenous Documented By  Ecoh Perez RN              ondansetron (ZOFRAN) injection 8 mg       Admin Date  02/08/2024 Action  $Given Dose  8 mg Route  Intravenous Documented By  Echo Perez RN                /73 (BP Location: Left arm)   Pulse 76   Temp 97.6  F (36.4  C) (Oral)   Resp 16   SpO2 94%     Echo Perez RN

## 2024-02-08 NOTE — TELEPHONE ENCOUNTER
Oncology Nurse Triage - Sickle Cell Pain Crisis:    Situation: Jennifer  calling about Sickle Cell Pain Crisis, requesting to be added on for IV fluids and pain medicine    Background:     Patient's last infusion was 2/5/24   Last clinic visit date:2/5/24 Farhan Mantilla   Does patient have active treatment plan?  Yes      Assessment of Symptoms:  Onset/Duration of symptoms: 1 day    Is it typical sickle cell pain? Yes   Location: everywhere left shoulder is worst   Character: Sharp           Intensity: 10/10    Any radiation of pain, numbness, tingling, weakness, warmth, swelling, discoloration of arms or legs?No     Fever?No  (if yes max temperature recorded in last 24 hours):      Chest Pain Present: No     Shortness of breath: No     Other home therapies tried: HEAT/HEATING PAD and WARM BATH     Last home medication taken and when: oxycodone and muscle relaxer at 6am     Any Refills Needed?: Yes     Does patient have transportation & length of time to get to clinic: Has appt at 10:30 for blood transfusion          Recommendations:     Message sent to Farhan Mantilla to see if OK to add IVF/PM to blood plan today?   OK per farhan Mantilla to add on IVF/PM to blood plan.     If you do not hear from the infusion center by 2pm then you will not be able to get in for an infusion today. If symptoms worsen while waiting for call back, and/or you experience fever, chills, SOB, chest pain, cough, n/v, dizziness, numbness, swelling, discoloration of extremities, then seek emergency evaluation in Emergency Department.     Please note, if you are late for your appt, you risk losing your infusion appt as it may delay another patient's infusion who arrived on time.

## 2024-02-08 NOTE — PATIENT INSTRUCTIONS
Dear Jennifer Cervantes    Thank you for choosing [facility] ([facility]) for your infusion.  The following information is a summary of our appointment as well as important reminders.      We look forward in seeing you on your next appointment here at [facility] ([facility]).  Please don t hesitate to call us at [phone] to reschedule any of your appointments or to speak with one of the [facility] registered nurses.  It was a pleasure taking care of you today.    Sincerely,    [facility]  [address] [place] [address]  [id number]  Phone:  [phone]

## 2024-02-09 ENCOUNTER — VIRTUAL VISIT (OUTPATIENT)
Dept: PULMONOLOGY | Facility: CLINIC | Age: 24
End: 2024-02-09
Attending: STUDENT IN AN ORGANIZED HEALTH CARE EDUCATION/TRAINING PROGRAM
Payer: COMMERCIAL

## 2024-02-09 ENCOUNTER — HOSPITAL ENCOUNTER (EMERGENCY)
Facility: CLINIC | Age: 24
Discharge: HOME | End: 2024-02-10
Attending: EMERGENCY MEDICINE | Admitting: EMERGENCY MEDICINE
Payer: COMMERCIAL

## 2024-02-09 DIAGNOSIS — D57.00 SICKLE CELL PAIN CRISIS (H): ICD-10-CM

## 2024-02-09 DIAGNOSIS — J45.40 MODERATE PERSISTENT ASTHMA, UNSPECIFIED WHETHER COMPLICATED: Primary | ICD-10-CM

## 2024-02-09 LAB
ALBUMIN SERPL BCG-MCNC: 4.2 G/DL (ref 3.5–5.2)
ALP SERPL-CCNC: 67 U/L (ref 40–150)
ALT SERPL W P-5'-P-CCNC: 37 U/L (ref 0–50)
ANION GAP SERPL CALCULATED.3IONS-SCNC: 12 MMOL/L (ref 7–15)
AST SERPL W P-5'-P-CCNC: 62 U/L (ref 0–45)
BASOPHILS # BLD AUTO: 0.2 10E3/UL (ref 0–0.2)
BASOPHILS NFR BLD AUTO: 2 %
BILIRUB SERPL-MCNC: 3 MG/DL
BUN SERPL-MCNC: 9.2 MG/DL (ref 6–20)
CALCIUM SERPL-MCNC: 8.7 MG/DL (ref 8.6–10)
CHLORIDE SERPL-SCNC: 102 MMOL/L (ref 98–107)
CREAT SERPL-MCNC: 0.53 MG/DL (ref 0.51–0.95)
DEPRECATED HCO3 PLAS-SCNC: 24 MMOL/L (ref 22–29)
EGFRCR SERPLBLD CKD-EPI 2021: >90 ML/MIN/1.73M2
EOSINOPHIL # BLD AUTO: 0.7 10E3/UL (ref 0–0.7)
EOSINOPHIL NFR BLD AUTO: 7 %
ERYTHROCYTE [DISTWIDTH] IN BLOOD BY AUTOMATED COUNT: 26.9 % (ref 10–15)
GLUCOSE SERPL-MCNC: 90 MG/DL (ref 70–99)
HCT VFR BLD AUTO: 24.4 % (ref 35–47)
HGB BLD-MCNC: 8.6 G/DL (ref 11.7–15.7)
IMM GRANULOCYTES # BLD: 0 10E3/UL
IMM GRANULOCYTES NFR BLD: 0 %
LYMPHOCYTES # BLD AUTO: 2.2 10E3/UL (ref 0.8–5.3)
LYMPHOCYTES NFR BLD AUTO: 21 %
MCH RBC QN AUTO: 30.9 PG (ref 26.5–33)
MCHC RBC AUTO-ENTMCNC: 35.2 G/DL (ref 31.5–36.5)
MCV RBC AUTO: 88 FL (ref 78–100)
MONOCYTES # BLD AUTO: 0.8 10E3/UL (ref 0–1.3)
MONOCYTES NFR BLD AUTO: 8 %
NEUTROPHILS # BLD AUTO: 6.3 10E3/UL (ref 1.6–8.3)
NEUTROPHILS NFR BLD AUTO: 62 %
NRBC # BLD AUTO: 0.5 10E3/UL
NRBC BLD AUTO-RTO: 5 /100
PLATELET # BLD AUTO: 390 10E3/UL (ref 150–450)
POTASSIUM SERPL-SCNC: 4.1 MMOL/L (ref 3.4–5.3)
PROT SERPL-MCNC: 7.1 G/DL (ref 6.4–8.3)
RBC # BLD AUTO: 2.78 10E6/UL (ref 3.8–5.2)
RETICS # AUTO: 0.66 10E6/UL (ref 0.03–0.1)
RETICS/RBC NFR AUTO: 24.2 % (ref 0.5–2)
SODIUM SERPL-SCNC: 138 MMOL/L (ref 135–145)
WBC # BLD AUTO: 10.6 10E3/UL (ref 4–11)

## 2024-02-09 PROCEDURE — 85045 AUTOMATED RETICULOCYTE COUNT: CPT | Performed by: EMERGENCY MEDICINE

## 2024-02-09 PROCEDURE — 99285 EMERGENCY DEPT VISIT HI MDM: CPT | Performed by: EMERGENCY MEDICINE

## 2024-02-09 PROCEDURE — 85025 COMPLETE CBC W/AUTO DIFF WBC: CPT | Performed by: EMERGENCY MEDICINE

## 2024-02-09 PROCEDURE — 96361 HYDRATE IV INFUSION ADD-ON: CPT | Performed by: EMERGENCY MEDICINE

## 2024-02-09 PROCEDURE — 80053 COMPREHEN METABOLIC PANEL: CPT | Performed by: EMERGENCY MEDICINE

## 2024-02-09 PROCEDURE — 250N000011 HC RX IP 250 OP 636: Performed by: EMERGENCY MEDICINE

## 2024-02-09 PROCEDURE — 96375 TX/PRO/DX INJ NEW DRUG ADDON: CPT | Performed by: EMERGENCY MEDICINE

## 2024-02-09 PROCEDURE — 96376 TX/PRO/DX INJ SAME DRUG ADON: CPT | Performed by: EMERGENCY MEDICINE

## 2024-02-09 PROCEDURE — 99284 EMERGENCY DEPT VISIT MOD MDM: CPT | Mod: 25 | Performed by: EMERGENCY MEDICINE

## 2024-02-09 PROCEDURE — 96374 THER/PROPH/DIAG INJ IV PUSH: CPT | Performed by: EMERGENCY MEDICINE

## 2024-02-09 PROCEDURE — 99215 OFFICE O/P EST HI 40 MIN: CPT | Mod: 95 | Performed by: STUDENT IN AN ORGANIZED HEALTH CARE EDUCATION/TRAINING PROGRAM

## 2024-02-09 PROCEDURE — 258N000003 HC RX IP 258 OP 636: Performed by: EMERGENCY MEDICINE

## 2024-02-09 PROCEDURE — 36415 COLL VENOUS BLD VENIPUNCTURE: CPT | Performed by: EMERGENCY MEDICINE

## 2024-02-09 RX ORDER — ONDANSETRON 2 MG/ML
8 INJECTION INTRAMUSCULAR; INTRAVENOUS
Status: COMPLETED | OUTPATIENT
Start: 2024-02-09 | End: 2024-02-09

## 2024-02-09 RX ORDER — SODIUM CHLORIDE, SODIUM LACTATE, POTASSIUM CHLORIDE, CALCIUM CHLORIDE 600; 310; 30; 20 MG/100ML; MG/100ML; MG/100ML; MG/100ML
INJECTION, SOLUTION INTRAVENOUS CONTINUOUS
Status: DISCONTINUED | OUTPATIENT
Start: 2024-02-09 | End: 2024-02-10 | Stop reason: HOSPADM

## 2024-02-09 RX ORDER — BUDESONIDE AND FORMOTEROL FUMARATE DIHYDRATE 160; 4.5 UG/1; UG/1
AEROSOL RESPIRATORY (INHALATION)
Qty: 20.4 G | Refills: 11 | Status: SHIPPED | OUTPATIENT
Start: 2024-02-09

## 2024-02-09 RX ORDER — KETOROLAC TROMETHAMINE 30 MG/ML
30 INJECTION, SOLUTION INTRAMUSCULAR; INTRAVENOUS ONCE
Status: COMPLETED | OUTPATIENT
Start: 2024-02-09 | End: 2024-02-09

## 2024-02-09 RX ADMIN — SODIUM CHLORIDE, POTASSIUM CHLORIDE, SODIUM LACTATE AND CALCIUM CHLORIDE: 600; 310; 30; 20 INJECTION, SOLUTION INTRAVENOUS at 21:11

## 2024-02-09 RX ADMIN — KETOROLAC TROMETHAMINE 30 MG: 30 INJECTION, SOLUTION INTRAMUSCULAR; INTRAVENOUS at 21:11

## 2024-02-09 RX ADMIN — HYDROMORPHONE HYDROCHLORIDE 1 MG: 1 INJECTION, SOLUTION INTRAMUSCULAR; INTRAVENOUS; SUBCUTANEOUS at 21:41

## 2024-02-09 RX ADMIN — HYDROMORPHONE HYDROCHLORIDE 1 MG: 1 INJECTION, SOLUTION INTRAMUSCULAR; INTRAVENOUS; SUBCUTANEOUS at 23:49

## 2024-02-09 RX ADMIN — ONDANSETRON 8 MG: 2 INJECTION INTRAMUSCULAR; INTRAVENOUS at 21:40

## 2024-02-09 RX ADMIN — HYDROMORPHONE HYDROCHLORIDE 1 MG: 1 INJECTION, SOLUTION INTRAMUSCULAR; INTRAVENOUS; SUBCUTANEOUS at 22:40

## 2024-02-09 ASSESSMENT — ACTIVITIES OF DAILY LIVING (ADL)
ADLS_ACUITY_SCORE: 37
ADLS_ACUITY_SCORE: 37

## 2024-02-09 ASSESSMENT — ASTHMA QUESTIONNAIRES
QUESTION_4 LAST FOUR WEEKS HOW OFTEN HAVE YOU USED YOUR RESCUE INHALER OR NEBULIZER MEDICATION (SUCH AS ALBUTEROL): THREE OR MORE TIMES PER DAY
ACT_TOTALSCORE: 8
QUESTION_3 LAST FOUR WEEKS HOW OFTEN DID YOUR ASTHMA SYMPTOMS (WHEEZING, COUGHING, SHORTNESS OF BREATH, CHEST TIGHTNESS OR PAIN) WAKE YOU UP AT NIGHT OR EARLIER THAN USUAL IN THE MORNING: TWO OR THREE NIGHTS A WEEK
QUESTION_5 LAST FOUR WEEKS HOW WOULD YOU RATE YOUR ASTHMA CONTROL: POORLY CONTROLLED
ACT_TOTALSCORE: 8
QUESTION_1 LAST FOUR WEEKS HOW MUCH OF THE TIME DID YOUR ASTHMA KEEP YOU FROM GETTING AS MUCH DONE AT WORK, SCHOOL OR AT HOME: MOST OF THE TIME
EMERGENCY_ROOM_LAST_YEAR_TOTAL: THREE OR MORE
HOSPITALIZATION_OVERNIGHT_LAST_YEAR_TOTAL: TWO
QUESTION_2 LAST FOUR WEEKS HOW OFTEN HAVE YOU HAD SHORTNESS OF BREATH: MORE THAN ONCE A DAY

## 2024-02-09 NOTE — NURSING NOTE
Is the patient currently in the state of [place]? YES    Visit mode:VIDEO    If the visit is dropped, the patient can be reconnected by: VIDEO VISIT: Text to cell phone:   Telephone Information:   Mobile [phone]       Will anyone else be joining the visit? NO  (If patient encounters technical issues they should call [phone])    How would you like to obtain your AVS? MyChart    Are changes needed to the allergy or medication list? Pt stated no changes to allergies and Pt stated no med changes    Reason for visit: DAFNE CORONAF

## 2024-02-09 NOTE — LETTER
2/9/2024         RE: Jennifer Cervantes  [address]  [facility] [address]        Dear Colleague,    Thank you for referring your patient, Jennifer Cervantes, to the [facility] [place]. Please see a copy of my visit note below.    [facility]  General Pulmonary Clinic-Follow Up VideoVisit  February 9, 2024           Pulm HPI Summary:     Jennifer Cervantes is a 22 year old female with history of  SCA c/b CVA, pain crises, asthma, allergic rhinitis who returns to clinic for asthma management.       Interval Histories:     Patient reports asthma feels less controlled in the last month. Reports frequent night time awakenings in the last month, waking up 2-3x/week usually 1x/night with wheezing and chest tightness which improves symptoms but doesn't last long. Has been taking Symbicort 2 puffs BID, doesn't use it as needed. Uses albuterol inhaler as needed 4-5x/day some days of the week. Sometimes symptoms occur spontaneously but other triggers include strong perfumes/odors. No one smoking/vaping, patient denies smoking or vaping. Has chronic dry cough, not changed. Has dyspnea associated with coughing and wheezing episodes, denies dyspnea at rest in absence of these symptoms. Does have occasional ORTEGA with prolonged exertion like long walks. Reports two asthma exacerbations requiring hospitalization in the last year, but denies being prescribed prednisone as her hematologist has recommended avoiding prednisone due to risk of triggering sickle cell complications such as pain crisis. Tested positive for COVID-19 on (2/5) with symptoms of fatigue, SOB, fevers but patient feels that this is a sign of needing a blood transfusion, and reports that she doesn't feel sick from COVID. No known sick contacts or recent travel.         Medications:     acetaminophen (TYLENOL) 325 MG tablet, Take 2 tablets (650 mg) by mouth  every 6 hours as needed for mild pain  albuterol (PROAIR HFA/PROVENTIL HFA/VENTOLIN HFA) 108 (90 Base) MCG/ACT inhaler, Inhale 2 puffs into the lungs every 6 hours as needed for shortness of breath or wheezing  albuterol (PROVENTIL) (2.5 MG/3ML) 0.083% neb solution, Take 2 vials (5 mg) by nebulization every 6 hours as needed for shortness of breath or wheezing  aspirin (ASA) 81 MG chewable tablet, Take 1 tablet (81 mg) by mouth 2 times daily  budesonide-formoterol (SYMBICORT) 160-4.5 MCG/ACT Inhaler, Inhale 2 puffs into the lungs 2 times daily  cetirizine (ZYRTEC) 10 MG tablet, Take 1 tablet (10 mg) by mouth daily  deferasirox (JADENU) 360 MG tablet, Take 4 tablets (1,440 mg) by mouth every evening  diphenhydrAMINE (BENADRYL) 25 MG capsule, Take 1 capsule (25 mg) by mouth every 6 hours as needed for itching or allergies  EPINEPHrine (ANY BX GENERIC EQUIV) 0.3 MG/0.3ML injection 2-pack, Inject 0.3 mLs (0.3 mg) into the muscle as needed for anaphylaxis  Hydroxyurea 1000 MG TABS, Take 3,000 mg by mouth daily  methocarbamol (ROBAXIN) 750 MG tablet, Take 1 tablet (750 mg) by mouth 4 times daily as needed for muscle spasms (during sickle pain crises. Okay to take scheduled while in pain)  naloxone (NARCAN) 4 MG/0.1ML nasal spray, Spray 4 mg into one nostril alternating nostrils as needed for opioid reversal every 2-3 minutes until assistance arrives (Patient not taking: Reported on 2/5/2024)  omeprazole (PRILOSEC) 20 MG DR capsule, Take 1 capsule (20 mg) by mouth daily  ondansetron (ZOFRAN) 8 MG tablet, Take 1 tablet (8 mg) by mouth every 8 hours as needed for nausea  oxyCODONE IR (ROXICODONE) 10 MG tablet, Take 1 tablet (10 mg) by mouth every 4 hours as needed for severe pain or breakthrough pain Goal 4 per day. Max 6 per day.    [COMPLETED] lactated ringers BOLUS 1,000 mL           Allergies:     Allergies   Allergen Reactions    Contrast Dye      Hives and breathing issues    Fish-Derived Products Hives    Seafood  Hives    Adhesive Tape Hives     Primipore dressing causes hives    Gadolinium     Iodinated Contrast Media             Past Medical and Past Surgical History:     Past Medical History:   Diagnosis Date    Anxiety     Bleeding disorder (H24)     Blood clotting disorder (H24)     Cerebral infarction (H) 2015    Cognitive developmental delay     low IQ. 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 side (H)     right hand contractures    Iron overload due to repeated red blood cell transfusions     Migraines     Multiple subsegmental pulmonary emboli without acute cor pulmonale (H) 02/01/2021    Oppositional defiant behavior     Presence of intrauterine contraceptive device 2/18/2020    Superficial venous thrombosis of arm, right 03/25/2021    Uncomplicated asthma        Past Surgical History:   Procedure Laterality Date    AS INSERT TUNNELED CV 2 CATH W/O PORT/PUMP      CHOLECYSTECTOMY      CV RIGHT HEART CATH MEASUREMENTS RECORDED N/A 11/18/2021    Procedure: Right Heart Cath;  Surgeon: Jackson Stauffer MD;  Location:  HEART CARDIAC CATH LAB    INSERT PORT VASCULAR ACCESS Left 4/21/2021    Procedure: INSERTION, VASCULAR ACCESS PORT (NOT SURE ON SIDE UNTIL REMOVAL);  Surgeon: Rajan More MD;  Location: [facility]    IR CHEST PORT PLACEMENT > 5 YRS OF AGE  4/21/2021    IR CVC NON TUNNEL LINE REMOVAL  5/6/2021    IR CVC NON TUNNEL PLACEMENT > 5 YRS  04/07/2020    IR CVC NON TUNNEL PLACEMENT > 5 YRS  4/30/2021    IR CVC NON TUNNEL PLACEMENT > 5 YRS  9/7/2022    IR PORT REMOVAL LEFT  4/21/2021    REMOVE PORT VASCULAR ACCESS Left 4/21/2021    Procedure: REMOVAL, VASCULAR ACCESS PORT LEFT;  Surgeon: Rajan More MD;  Location: [facility] OR    REPAIR TENDON ELBOW Right 10/02/2019    Procedure: Right Elbow Flexor Lengthening, Flexor Pronator Slide Of Wrist and Finger, Thumb Adductor Lengthening;  Surgeon:  "Anai Franco MD;  Location: [facility] OR    TONSILLECTOMY Bilateral 10/02/2019    Procedure: Bilateral Tonsillectomy;  Surgeon: Farhana Guy MD;  Location: [facility] OR    ZC BREAST REDUCTION (INCLUDES LIPO) TIER 3 Bilateral 04/23/2019             Social History:     Social History     Socioeconomic History    Marital status: Single     Spouse name: None    Number of children: None    Years of education: None    Highest education level: None   Tobacco Use    Smoking status: Never     Passive exposure: Never    Smokeless tobacco: Never   Substance and Sexual Activity    Alcohol use: Not Currently     Alcohol/week: 0.0 standard drinks of alcohol    Drug use: Never    Sexual activity: Not Currently     Partners: Male     Birth control/protection: Other   Social History Narrative    Lives with mom and extended family (mom is her PCA and ILS worker) but \"toxic environment\" per her report. As of 9/28/2022 she is planning to move out at some point soon. She has minimal support at home despite her significant SCD comorbidities and cognitive delay from stroke.     Social Determinants of Health     Interpersonal Safety: Not At Risk (3/10/2023)    Humiliation, Afraid, Rape, and Kick questionnaire     Fear of Current or Ex-Partner: No     Emotionally Abused: No     Physically Abused: No     Sexually Abused: No          Exam:     Video visit       Data:     Labs/PFTs:  Recent Results (from the past 168 hour(s))   Comprehensive metabolic panel    Collection Time: 02/03/24  9:49 PM   Result Value Ref Range    Sodium 141 135 - 145 mmol/L    Potassium 4.0 3.4 - 5.3 mmol/L    Carbon Dioxide (CO2) 24 22 - 29 mmol/L    Anion Gap 10 7 - 15 mmol/L    Urea Nitrogen 8.2 6.0 - 20.0 mg/dL    Creatinine 0.57 0.51 - 0.95 mg/dL    GFR Estimate >90 >60 mL/min/1.73m2    Calcium 8.9 8.6 - 10.0 mg/dL    Chloride 107 98 - 107 mmol/L    Glucose 97 70 - 99 mg/dL    Alkaline Phosphatase 70 40 - 150 U/L    AST 60 (H) 0 - 45 U/L    ALT 43 0 " - 50 U/L    Protein Total 7.2 6.4 - 8.3 g/dL    Albumin 4.4 3.5 - 5.2 g/dL    Bilirubin Total 3.5 (H) <=1.2 mg/dL   Reticulocyte count    Collection Time: 02/03/24  9:49 PM   Result Value Ref Range    % Reticulocyte 22.4 (H) 0.5 - 2.0 %    Absolute Reticulocyte 0.550 (H) 0.025 - 0.095 10e6/uL   HCG qualitative Blood    Collection Time: 02/03/24  9:49 PM   Result Value Ref Range    hCG Serum Qualitative Negative Negative   CBC with platelets and differential    Collection Time: 02/03/24  9:49 PM   Result Value Ref Range    WBC Count 12.5 (H) 4.0 - 11.0 10e3/uL    RBC Count 2.37 (L) 3.80 - 5.20 10e6/uL    Hemoglobin 7.3 (L) 11.7 - 15.7 g/dL    Hematocrit 20.6 (L) 35.0 - 47.0 %    MCV 87 78 - 100 fL    MCH 30.8 26.5 - 33.0 pg    MCHC 35.4 31.5 - 36.5 g/dL    RDW 26.9 (H) 10.0 - 15.0 %    Platelet Count 384 150 - 450 10e3/uL    % Neutrophils 66 %    % Lymphocytes 19 %    % Monocytes 8 %    % Eosinophils 6 %    % Basophils 1 %    % Immature Granulocytes 0 %    NRBCs per 100 WBC 3 (H) <1 /100    Absolute Neutrophils 9.0 (H) 1.6 - 8.3 10e3/uL    Absolute Lymphocytes 2.6 0.8 - 5.3 10e3/uL    Absolute Monocytes 1.0 0.0 - 1.3 10e3/uL    Absolute Eosinophils 0.8 (H) 0.0 - 0.7 10e3/uL    Absolute Basophils 0.2 0.0 - 0.2 10e3/uL    Absolute Immature Granulocytes 0.0 <=0.4 10e3/uL    Absolute NRBCs 0.4 10e3/uL   CBC with platelets and differential    Collection Time: 02/05/24 12:51 PM   Result Value Ref Range    WBC Count 11.6 (H) 4.0 - 11.0 10e3/uL    RBC Count 2.31 (L) 3.80 - 5.20 10e6/uL    Hemoglobin 7.0 (L) 11.7 - 15.7 g/dL    Hematocrit 19.4 (L) 35.0 - 47.0 %    MCV 84 78 - 100 fL    MCH 30.3 26.5 - 33.0 pg    MCHC 36.1 31.5 - 36.5 g/dL    RDW 26.3 (H) 10.0 - 15.0 %    Platelet Count 383 150 - 450 10e3/uL    % Neutrophils 67 %    % Lymphocytes 16 %    % Monocytes 7 %    % Eosinophils 7 %    % Basophils 2 %    % Immature Granulocytes 1 %    NRBCs per 100 WBC 2 (H) <1 /100    Absolute Neutrophils 7.7 1.6 - 8.3 10e3/uL     Absolute Lymphocytes 1.9 0.8 - 5.3 10e3/uL    Absolute Monocytes 0.8 0.0 - 1.3 10e3/uL    Absolute Eosinophils 0.8 (H) 0.0 - 0.7 10e3/uL    Absolute Basophils 0.3 (H) 0.0 - 0.2 10e3/uL    Absolute Immature Granulocytes 0.1 <=0.4 10e3/uL    Absolute NRBCs 0.2 10e3/uL   Adult Type and Screen    Collection Time: 02/05/24 12:51 PM   Result Value Ref Range    ABO/RH(D) O POS     Antibody Screen Negative Negative    SPECIMEN EXPIRATION DATE [id number]    Respiratory Panel PCR    Collection Time: 02/05/24  2:11 PM    Specimen: Nasopharyngeal; Swab   Result Value Ref Range    Adenovirus Not Detected Not Detected    Coronavirus Detected (A) Not Detected    Human Metapneumovirus Not Detected Not Detected    Human Rhin/Enterovirus Not Detected Not Detected    Influenza A Not Detected Not Detected    Influenza A, H1 Not Detected Not Detected    Influenza A 2009 H1N1 Not Detected Not Detected    Influenza A, H3 Not Detected Not Detected    Influenza B Not Detected Not Detected    Parainfluenza Virus 1 Not Detected Not Detected    Parainfluenza Virus 2 Not Detected Not Detected    Parainfluenza Virus 3 Not Detected Not Detected    Parainfluenza Virus 4 Not Detected Not Detected    Respiratory Syncytial Virus A Not Detected Not Detected    Respiratory Syncytial Virus B Not Detected Not Detected    Chlamydia Pneumoniae Not Detected Not Detected    Mycoplasma Pneumoniae Not Detected Not Detected   Symptomatic COVID-19 Virus (Coronavirus) by PCR Nose    Collection Time: 02/05/24  2:13 PM    Specimen: Nose; Swab   Result Value Ref Range    SARS CoV2 PCR Negative Negative   Prepare red blood cells (unit)    Collection Time: 02/07/24  4:48 PM   Result Value Ref Range    Blood Component Type Red Blood Cells     Product Code [id number]     Unit Status Transfused     Unit Number [id number]     CROSSMATCH Compatible     CODING SYSTEM [id number]     ISSUE DATE AND TIME [id number]     UNIT ABO/RH O-     UNIT TYPE ISBT 9500         Imaging      XR Chest 2 Views  Narrative: EXAM: XR CHEST 2 VIEWS  LOCATION: [facility]  DATE: 12/31/2023    INDICATION: chest pain, hx sickle cell crisis  COMPARISON: 12/08/2023.    FINDINGS: Left chest wall port. There is no pneumothorax. The heart size is normal. The lungs are clear. There is no pleural effusion.  Impression: IMPRESSION: No acute abnormality.      Echo:   No results found for this or any previous visit (from the past 4320 hour(s)).         Assessment and Plan:     Pulmonary problem list:  Moderate persistent eosinophilic asthma  Allergic rhinitis  Sickle cell anemia  Recent COVID-19 infection (positive test 2/5/2024)    Patient reporting 1 month of poorly controlled asthma symptoms in the setting of recent COVID-19 infection.  I suspect that her increased symptoms are related to her recent viral infection and should improve with increased frequency of inhaled corticosteroid as well as time.  Could consider starting montelukast if symptoms are not improved with maximal ICS/LABA therapy.    Recommendations:  Increase Symbicort to 2 puffs twice daily and as needed up to 12 puffs daily per HONEY guidelines  Hold albuterol inhaler and nebulizer, utilize Symbicort as above  Consider montelukast at next visit  If symptoms worsen could consider short course of steroids but would coordinate with Hematology  Return to clinic in person in 2 months with PFTs and chest x-ray    Patient was seen and plan of care was discussed with attending physician Dr. Yeh.    Jason Harvey MD  Pulmonary and Critical Care Fellow  Pager: [phone]       Virtual Visit Details     Type of service:  Video Visit   Video Start Time: 1:01 PM  Video End Time:1:47 PM    Originating Location (pt. Location): Home  Distant Location (provider location):  On-site  Platform used for Video Visit: Clarisa          Attestation signed by Norman Yeh MD at 2/11/2024 10:16 PM  (Updated):  I was present with the resident during the history and exam.  I discussed the case with the resident and agree with the findings as documented in the assessment and plan.    Norman Yeh MD FCCP  Associate Professor of Medicine  Division of Pulmonary, Allergy & Critical Care   [facility]  [facility]    I spent a total of  40  minutes reviewing chart (previous notes), reviewing test results, reviewing chest x rays and CT scans, talking with and examining patient, formulating plan, adjusting medications, and documentation of my findings and plan on the day of the encounter.

## 2024-02-09 NOTE — PATIENT INSTRUCTIONS
- Increase symbicort to twice daily PLUS as needed, up to 12 puffs daily (use this instead of albuterol inhaler and nebulizer)  - Call if symptoms not improved, could consider short course of steroids  - Return to clinic in person in 2 months with breathing tests

## 2024-02-09 NOTE — PROGRESS NOTES
"Virtual Visit Details    Type of service:  Video Visit   Video Start Time: {video visit start/end time for provider to select:572900}  Video End Time:{video visit start/end time for provider to select:306859}    Originating Location (pt. Location): {video visit patient location:859766::\"Home\"}  {PROVIDER LOCATION On-site should be selected for visits conducted from your clinic location or adjoining [facility] hospital, academic office, or other nearby [facility] building. Off-site should be selected for all other provider locations, including home:140434}  Distant Location (provider location):  {virtual location provider:417448}  Platform used for Video Visit: {Virtual Visit Platforms:275110::\"ThinkGrid\"}    "

## 2024-02-09 NOTE — PROGRESS NOTES
[facility]  [facility]  General Pulmonary Clinic-Follow Up VideoVisit  February 9, 2024           Pulm HPI Summary:     Jennifer Cervantes is a 22 year old female with history of  SCA c/b CVA, pain crises, asthma, allergic rhinitis who returns to clinic for asthma management.       Interval Histories:     Patient reports asthma feels less controlled in the last month. Reports frequent night time awakenings in the last month, waking up 2-3x/week usually 1x/night with wheezing and chest tightness which improves symptoms but doesn't last long. Has been taking Symbicort 2 puffs BID, doesn't use it as needed. Uses albuterol inhaler as needed 4-5x/day some days of the week. Sometimes symptoms occur spontaneously but other triggers include strong perfumes/odors. No one smoking/vaping, patient denies smoking or vaping. Has chronic dry cough, not changed. Has dyspnea associated with coughing and wheezing episodes, denies dyspnea at rest in absence of these symptoms. Does have occasional ORTEGA with prolonged exertion like long walks. Reports two asthma exacerbations requiring hospitalization in the last year, but denies being prescribed prednisone as her hematologist has recommended avoiding prednisone due to risk of triggering sickle cell complications such as pain crisis. Tested positive for COVID-19 on (2/5) with symptoms of fatigue, SOB, fevers but patient feels that this is a sign of needing a blood transfusion, and reports that she doesn't feel sick from COVID. No known sick contacts or recent travel.         Medications:     acetaminophen (TYLENOL) 325 MG tablet, Take 2 tablets (650 mg) by mouth every 6 hours as needed for mild pain  albuterol (PROAIR HFA/PROVENTIL HFA/VENTOLIN HFA) 108 (90 Base) MCG/ACT inhaler, Inhale 2 puffs into the lungs every 6 hours as needed for shortness of breath or wheezing  albuterol (PROVENTIL) (2.5 MG/3ML) 0.083% neb solution, Take 2 vials (5 mg)  by nebulization every 6 hours as needed for shortness of breath or wheezing  aspirin (ASA) 81 MG chewable tablet, Take 1 tablet (81 mg) by mouth 2 times daily  budesonide-formoterol (SYMBICORT) 160-4.5 MCG/ACT Inhaler, Inhale 2 puffs into the lungs 2 times daily  cetirizine (ZYRTEC) 10 MG tablet, Take 1 tablet (10 mg) by mouth daily  deferasirox (JADENU) 360 MG tablet, Take 4 tablets (1,440 mg) by mouth every evening  diphenhydrAMINE (BENADRYL) 25 MG capsule, Take 1 capsule (25 mg) by mouth every 6 hours as needed for itching or allergies  EPINEPHrine (ANY BX GENERIC EQUIV) 0.3 MG/0.3ML injection 2-pack, Inject 0.3 mLs (0.3 mg) into the muscle as needed for anaphylaxis  Hydroxyurea 1000 MG TABS, Take 3,000 mg by mouth daily  methocarbamol (ROBAXIN) 750 MG tablet, Take 1 tablet (750 mg) by mouth 4 times daily as needed for muscle spasms (during sickle pain crises. Okay to take scheduled while in pain)  naloxone (NARCAN) 4 MG/0.1ML nasal spray, Spray 4 mg into one nostril alternating nostrils as needed for opioid reversal every 2-3 minutes until assistance arrives (Patient not taking: Reported on 2/5/2024)  omeprazole (PRILOSEC) 20 MG DR capsule, Take 1 capsule (20 mg) by mouth daily  ondansetron (ZOFRAN) 8 MG tablet, Take 1 tablet (8 mg) by mouth every 8 hours as needed for nausea  oxyCODONE IR (ROXICODONE) 10 MG tablet, Take 1 tablet (10 mg) by mouth every 4 hours as needed for severe pain or breakthrough pain Goal 4 per day. Max 6 per day.    [COMPLETED] lactated ringers BOLUS 1,000 mL           Allergies:     Allergies   Allergen Reactions    Contrast Dye      Hives and breathing issues    Fish-Derived Products Hives    Seafood Hives    Adhesive Tape Hives     Primipore dressing causes hives    Gadolinium     Iodinated Contrast Media             Past Medical and Past Surgical History:     Past Medical History:   Diagnosis Date    Anxiety     Bleeding disorder (H24)     Blood clotting disorder (H24)     Cerebral  infarction (H) 2015    Cognitive developmental delay     low IQ. 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 side (H)     right hand contractures    Iron overload due to repeated red blood cell transfusions     Migraines     Multiple subsegmental pulmonary emboli without acute cor pulmonale (H) 02/01/2021    Oppositional defiant behavior     Presence of intrauterine contraceptive device 2/18/2020    Superficial venous thrombosis of arm, right 03/25/2021    Uncomplicated asthma        Past Surgical History:   Procedure Laterality Date    AS INSERT TUNNELED CV 2 CATH W/O PORT/PUMP      CHOLECYSTECTOMY      CV RIGHT HEART CATH MEASUREMENTS RECORDED N/A 11/18/2021    Procedure: Right Heart Cath;  Surgeon: Jackson Stauffer MD;  Location:  HEART CARDI[facility] CATH LAB    INSERT PORT VASCULAR ACCESS Left 4/21/2021    Procedure: INSERTION, VASCULAR ACCESS PORT (NOT SURE ON SIDE UNTIL REMOVAL);  Surgeon: Rajan More MD;  Location: [facility] OR    IR CHEST PORT PLACEMENT > 5 YRS OF AGE  4/21/2021    IR CVC NON TUNNEL LINE REMOVAL  5/6/2021    IR CVC NON TUNNEL PLACEMENT > 5 YRS  04/07/2020    IR CVC NON TUNNEL PLACEMENT > 5 YRS  4/30/2021    IR CVC NON TUNNEL PLACEMENT > 5 YRS  9/7/2022    IR PORT REMOVAL LEFT  4/21/2021    REMOVE PORT VASCULAR ACCESS Left 4/21/2021    Procedure: REMOVAL, VASCULAR ACCESS PORT LEFT;  Surgeon: Rajan More MD;  Location: [facility] OR    REPAIR TENDON ELBOW Right 10/02/2019    Procedure: Right Elbow Flexor Lengthening, Flexor Pronator Slide Of Wrist and Finger, Thumb Adductor Lengthening;  Surgeon: Anai Franco MD;  Location: [facility] OR    TONSILLECTOMY Bilateral 10/02/2019    Procedure: Bilateral Tonsillectomy;  Surgeon: Farhana Guy MD;  Location: [facility] OR    ZZC BREAST REDUCTION (INCLUDES LIPO) TIER 3 Bilateral 04/23/2019             Social History:     Social  "History     Socioeconomic History    Marital status: Single     Spouse name: None    Number of children: None    Years of education: None    Highest education level: None   Tobacco Use    Smoking status: Never     Passive exposure: Never    Smokeless tobacco: Never   Substance and Sexual Activity    Alcohol use: Not Currently     Alcohol/week: 0.0 standard drinks of alcohol    Drug use: Never    Sexual activity: Not Currently     Partners: Male     Birth control/protection: Other   Social History Narrative    Lives with mom and extended family (mom is her PCA and ILS worker) but \"toxic environment\" per her report. As of 9/28/2022 she is planning to move out at some point soon. She has minimal support at home despite her significant SCD comorbidities and cognitive delay from stroke.     Social Determinants of Health     Interpersonal Safety: Not At Risk (3/10/2023)    Humiliation, Afraid, Rape, and Kick questionnaire     Fear of Current or Ex-Partner: No     Emotionally Abused: No     Physically Abused: No     Sexually Abused: No          Exam:     Video visit       Data:     Labs/PFTs:  Recent Results (from the past 168 hour(s))   Comprehensive metabolic panel    Collection Time: 02/03/24  9:49 PM   Result Value Ref Range    Sodium 141 135 - 145 mmol/L    Potassium 4.0 3.4 - 5.3 mmol/L    Carbon Dioxide (CO2) 24 22 - 29 mmol/L    Anion Gap 10 7 - 15 mmol/L    Urea Nitrogen 8.2 6.0 - 20.0 mg/dL    Creatinine 0.57 0.51 - 0.95 mg/dL    GFR Estimate >90 >60 mL/min/1.73m2    Calcium 8.9 8.6 - 10.0 mg/dL    Chloride 107 98 - 107 mmol/L    Glucose 97 70 - 99 mg/dL    Alkaline Phosphatase 70 40 - 150 U/L    AST 60 (H) 0 - 45 U/L    ALT 43 0 - 50 U/L    Protein Total 7.2 6.4 - 8.3 g/dL    Albumin 4.4 3.5 - 5.2 g/dL    Bilirubin Total 3.5 (H) <=1.2 mg/dL   Reticulocyte count    Collection Time: 02/03/24  9:49 PM   Result Value Ref Range    % Reticulocyte 22.4 (H) 0.5 - 2.0 %    Absolute Reticulocyte 0.550 (H) 0.025 - 0.095 " 10e6/uL   HCG qualitative Blood    Collection Time: 02/03/24  9:49 PM   Result Value Ref Range    hCG Serum Qualitative Negative Negative   CBC with platelets and differential    Collection Time: 02/03/24  9:49 PM   Result Value Ref Range    WBC Count 12.5 (H) 4.0 - 11.0 10e3/uL    RBC Count 2.37 (L) 3.80 - 5.20 10e6/uL    Hemoglobin 7.3 (L) 11.7 - 15.7 g/dL    Hematocrit 20.6 (L) 35.0 - 47.0 %    MCV 87 78 - 100 fL    MCH 30.8 26.5 - 33.0 pg    MCHC 35.4 31.5 - 36.5 g/dL    RDW 26.9 (H) 10.0 - 15.0 %    Platelet Count 384 150 - 450 10e3/uL    % Neutrophils 66 %    % Lymphocytes 19 %    % Monocytes 8 %    % Eosinophils 6 %    % Basophils 1 %    % Immature Granulocytes 0 %    NRBCs per 100 WBC 3 (H) <1 /100    Absolute Neutrophils 9.0 (H) 1.6 - 8.3 10e3/uL    Absolute Lymphocytes 2.6 0.8 - 5.3 10e3/uL    Absolute Monocytes 1.0 0.0 - 1.3 10e3/uL    Absolute Eosinophils 0.8 (H) 0.0 - 0.7 10e3/uL    Absolute Basophils 0.2 0.0 - 0.2 10e3/uL    Absolute Immature Granulocytes 0.0 <=0.4 10e3/uL    Absolute NRBCs 0.4 10e3/uL   CBC with platelets and differential    Collection Time: 02/05/24 12:51 PM   Result Value Ref Range    WBC Count 11.6 (H) 4.0 - 11.0 10e3/uL    RBC Count 2.31 (L) 3.80 - 5.20 10e6/uL    Hemoglobin 7.0 (L) 11.7 - 15.7 g/dL    Hematocrit 19.4 (L) 35.0 - 47.0 %    MCV 84 78 - 100 fL    MCH 30.3 26.5 - 33.0 pg    MCHC 36.1 31.5 - 36.5 g/dL    RDW 26.3 (H) 10.0 - 15.0 %    Platelet Count 383 150 - 450 10e3/uL    % Neutrophils 67 %    % Lymphocytes 16 %    % Monocytes 7 %    % Eosinophils 7 %    % Basophils 2 %    % Immature Granulocytes 1 %    NRBCs per 100 WBC 2 (H) <1 /100    Absolute Neutrophils 7.7 1.6 - 8.3 10e3/uL    Absolute Lymphocytes 1.9 0.8 - 5.3 10e3/uL    Absolute Monocytes 0.8 0.0 - 1.3 10e3/uL    Absolute Eosinophils 0.8 (H) 0.0 - 0.7 10e3/uL    Absolute Basophils 0.3 (H) 0.0 - 0.2 10e3/uL    Absolute Immature Granulocytes 0.1 <=0.4 10e3/uL    Absolute NRBCs 0.2 10e3/uL   Adult Type and Screen     Collection Time: 02/05/24 12:51 PM   Result Value Ref Range    ABO/RH(D) O POS     Antibody Screen Negative Negative    SPECIMEN EXPIRATION DATE [id number]    Respiratory Panel PCR    Collection Time: 02/05/24  2:11 PM    Specimen: Nasopharyngeal; Swab   Result Value Ref Range    Adenovirus Not Detected Not Detected    Coronavirus Detected (A) Not Detected    Human Metapneumovirus Not Detected Not Detected    Human Rhin/Enterovirus Not Detected Not Detected    Influenza A Not Detected Not Detected    Influenza A, H1 Not Detected Not Detected    Influenza A 2009 H1N1 Not Detected Not Detected    Influenza A, H3 Not Detected Not Detected    Influenza B Not Detected Not Detected    Parainfluenza Virus 1 Not Detected Not Detected    Parainfluenza Virus 2 Not Detected Not Detected    Parainfluenza Virus 3 Not Detected Not Detected    Parainfluenza Virus 4 Not Detected Not Detected    Respiratory Syncytial Virus A Not Detected Not Detected    Respiratory Syncytial Virus B Not Detected Not Detected    Chlamydia Pneumoniae Not Detected Not Detected    Mycoplasma Pneumoniae Not Detected Not Detected   Symptomatic COVID-19 Virus (Coronavirus) by PCR Nose    Collection Time: 02/05/24  2:13 PM    Specimen: Nose; Swab   Result Value Ref Range    SARS CoV2 PCR Negative Negative   Prepare red blood cells (unit)    Collection Time: 02/07/24  4:48 PM   Result Value Ref Range    Blood Component Type Red Blood Cells     Product Code [id number]     Unit Status Transfused     Unit Number [id number]     CROSSMATCH Compatible     CODING SYSTEM [id number]     ISSUE DATE AND TIME [id number]     UNIT ABO/RH O-     UNIT TYPE ISBT 9500        Imaging      XR Chest 2 Views  Narrative: EXAM: XR CHEST 2 VIEWS  LOCATION: [facility]  DATE: 12/31/2023    INDICATION: chest pain, hx sickle cell crisis  COMPARISON: 12/08/2023.    FINDINGS: Left chest wall port. There is no pneumothorax. The heart  size is normal. The lungs are clear. There is no pleural effusion.  Impression: IMPRESSION: No acute abnormality.      Echo:   No results found for this or any previous visit (from the past 4320 hour(s)).         Assessment and Plan:     Pulmonary problem list:  Moderate persistent eosinophilic asthma  Allergic rhinitis  Sickle cell anemia  Recent COVID-19 infection (positive test 2/5/2024)    Patient reporting 1 month of poorly controlled asthma symptoms in the setting of recent COVID-19 infection.  I suspect that her increased symptoms are related to her recent viral infection and should improve with increased frequency of inhaled corticosteroid as well as time.  Could consider starting montelukast if symptoms are not improved with maximal ICS/LABA therapy.    Recommendations:  Increase Symbicort to 2 puffs twice daily and as needed up to 12 puffs daily per HONEY guidelines  Hold albuterol inhaler and nebulizer, utilize Symbicort as above  Consider montelukast at next visit  If symptoms worsen could consider short course of steroids but would coordinate with Hematology  Return to clinic in person in 2 months with PFTs and chest x-ray    Patient was seen and plan of care was discussed with attending physician Dr. Yeh.    Jason Harvey MD  Pulmonary and Critical Care Fellow  Pager: [phone]       Virtual Visit Details     Type of service:  Video Visit   Video Start Time: 1:01 PM  Video End Time:1:47 PM    Originating Location (pt. Location): Home  Distant Location (provider location):  On-site  Platform used for Video Visit: Clarisa

## 2024-02-10 VITALS
HEART RATE: 71 BPM | DIASTOLIC BLOOD PRESSURE: 60 MMHG | SYSTOLIC BLOOD PRESSURE: 117 MMHG | RESPIRATION RATE: 18 BRPM | TEMPERATURE: 98.5 F | OXYGEN SATURATION: 97 %

## 2024-02-10 PROCEDURE — 250N000011 HC RX IP 250 OP 636: Performed by: EMERGENCY MEDICINE

## 2024-02-10 RX ORDER — HEPARIN SODIUM (PORCINE) LOCK FLUSH IV SOLN 100 UNIT/ML 100 UNIT/ML
5-10 SOLUTION INTRAVENOUS
Status: DISCONTINUED | OUTPATIENT
Start: 2024-02-10 | End: 2024-02-10 | Stop reason: HOSPADM

## 2024-02-10 RX ADMIN — HEPARIN 5 ML: 100 SYRINGE at 00:29

## 2024-02-10 ASSESSMENT — ACTIVITIES OF DAILY LIVING (ADL): ADLS_ACUITY_SCORE: 37

## 2024-02-10 NOTE — ED PROVIDER NOTES
ED Provider Note  [facility]      History     Chief Complaint   Patient presents with    Sickle Cell Pain Crisis     Generalized body pain.     The history is provided by the patient and medical records.     Jennifer Cervantes is a 23 year old female with a known history of sickle cell disease who presents to the emergency department today complaining of increasing sickle cell pain.  Patient reports that she had a blood transfusion yesterday at the infusion clinic that her hematology team had ordered due to her dropping hemoglobin.  Today she started noticing worsening pain in her low back as well as her left hand.  Of note, patient has had a previous stroke, and her right upper extremity is largely nonfunctional so she is definitely dependent on her left upper extremity for everything.  Patient denies any chest pain, shortness of breath, fevers, chills, nasal congestion, or sore throat.  No abdominal pain.  No nausea, vomiting, or diarrhea.  No urinary symptoms. Patient denies any trauma or falls. Her back pain is very consistent with her typical sickle cell flares.  No radiation of pain down the buttocks into the legs.  She took her oxycodone 10 mg p.o. every 4 hours as well as her methocarbamol but this was not effective for her.  She comes in because of worsening pain.     This part of the medical record was transcribed by Pawel Phillips, Medical Scribe, from a dictation done by Leslie Roper MD.     Past Medical History  Past Medical History:   Diagnosis Date    Anxiety     Bleeding disorder (H24)     Blood clotting disorder (H24)     Cerebral infarction (H) 2015    Cognitive developmental delay     low IQ. 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 side (H)     right hand contractures    Iron overload due to repeated red blood cell transfusions     Migraines      Multiple subsegmental pulmonary emboli without acute cor pulmonale (H) 02/01/2021    Oppositional defiant behavior     Presence of intrauterine contraceptive device 2/18/2020    Superficial venous thrombosis of arm, right 03/25/2021    Uncomplicated asthma      Past Surgical History:   Procedure Laterality Date    AS INSERT TUNNELED CV 2 CATH W/O PORT/PUMP      CHOLECYSTECTOMY      CV RIGHT HEART CATH MEASUREMENTS RECORDED N/A 11/18/2021    Procedure: Right Heart Cath;  Surgeon: Jackson Stauffer MD;  Location:  HEART CARDIAC CATH LAB    INSERT PORT VASCULAR ACCESS Left 4/21/2021    Procedure: INSERTION, VASCULAR ACCESS PORT (NOT SURE ON SIDE UNTIL REMOVAL);  Surgeon: Rajan More MD;  Location: [facility] OR    IR CHEST PORT PLACEMENT > 5 YRS OF AGE  4/21/2021    IR CVC NON TUNNEL LINE REMOVAL  5/6/2021    IR CVC NON TUNNEL PLACEMENT > 5 YRS  04/07/2020    IR CVC NON TUNNEL PLACEMENT > 5 YRS  4/30/2021    IR CVC NON TUNNEL PLACEMENT > 5 YRS  9/7/2022    IR PORT REMOVAL LEFT  4/21/2021    REMOVE PORT VASCULAR ACCESS Left 4/21/2021    Procedure: REMOVAL, VASCULAR ACCESS PORT LEFT;  Surgeon: Rajan More MD;  Location: [facility] OR    REPAIR TENDON ELBOW Right 10/02/2019    Procedure: Right Elbow Flexor Lengthening, Flexor Pronator Slide Of Wrist and Finger, Thumb Adductor Lengthening;  Surgeon: Anai Franco MD;  Location: [facility] OR    TONSILLECTOMY Bilateral 10/02/2019    Procedure: Bilateral Tonsillectomy;  Surgeon: Farhana Guy MD;  Location: [facility] OR    ZZC BREAST REDUCTION (INCLUDES LIPO) TIER 3 Bilateral 04/23/2019     acetaminophen (TYLENOL) 325 MG tablet  albuterol (PROAIR HFA/PROVENTIL HFA/VENTOLIN HFA) 108 (90 Base) MCG/ACT inhaler  albuterol (PROVENTIL) (2.5 MG/3ML) 0.083% neb solution  aspirin (ASA) 81 MG chewable tablet  budesonide-formoterol (SYMBICORT) 160-4.5 MCG/ACT Inhaler  budesonide-formoterol (SYMBICORT) 160-4.5 MCG/ACT Inhaler  cetirizine (ZYRTEC) 10 MG tablet  deferasirox  (JADENU) 360 MG tablet  diphenhydrAMINE (BENADRYL) 25 MG capsule  EPINEPHrine (ANY BX GENERIC EQUIV) 0.3 MG/0.3ML injection 2-pack  Hydroxyurea 1000 MG TABS  methocarbamol (ROBAXIN) 750 MG tablet  naloxone (NARCAN) 4 MG/0.1ML nasal spray  omeprazole (PRILOSEC) 20 MG DR capsule  ondansetron (ZOFRAN) 8 MG tablet  oxyCODONE IR (ROXICODONE) 10 MG tablet      Allergies   Allergen Reactions    Contrast Dye      Hives and breathing issues    Fish-Derived Products Hives    Seafood Hives    Adhesive Tape Hives     Primipore dressing causes hives    Gadolinium     Iodinated Contrast Media      Family History  Family History   Problem Relation Age of Onset    Sickle Cell Trait Mother     Hypertension Mother     Asthma Mother     Sickle Cell Trait Father     Glaucoma No family hx of     Macular Degeneration No family hx of     Diabetes No family hx of     Gout No family hx of      Social History   Social History     Tobacco Use    Smoking status: Never     Passive exposure: Never    Smokeless tobacco: Never   Substance Use Topics    Alcohol use: Not Currently     Alcohol/week: 0.0 standard drinks of alcohol    Drug use: Never         A medically appropriate review of systems was performed with pertinent positives and negatives noted in the HPI, and all other systems negative.    Physical Exam   BP: 126/84  Pulse: 89  Temp: 98.5  F (36.9  C)  Resp: 16  SpO2: 92 %  Physical Exam  Vitals and nursing note reviewed.   Constitutional:       General: She is not in acute distress.     Appearance: She is not diaphoretic.      Comments: Adult female, pleasant, polite, alert, cooperative   HENT:      Head: Atraumatic.      Mouth/Throat:      Mouth: Mucous membranes are moist.      Pharynx: Oropharynx is clear. No oropharyngeal exudate.   Eyes:      General: No scleral icterus.     Pupils: Pupils are equal, round, and reactive to light.   Cardiovascular:      Rate and Rhythm: Normal rate.      Pulses: Normal pulses.      Heart sounds:  Normal heart sounds. No murmur heard.  Pulmonary:      Effort: No respiratory distress.      Breath sounds: Normal breath sounds.   Abdominal:      General: Bowel sounds are normal.      Palpations: Abdomen is soft.      Tenderness: There is no abdominal tenderness.   Musculoskeletal:         General: Tenderness present.      Comments: Some tenderness to palpation over low back and paraspinous muscles bilaterally    Tenderness to palpation over left hand.  Strength and sensation intact, cap refill normal.    Right upper extremity paretic at baseline   Skin:     General: Skin is warm.      Findings: No rash.   Neurological:      Mental Status: She is alert.         ED Course, Procedures, & Data      Procedures               Results for orders placed or performed during the hospital encounter of 02/09/24   Comprehensive metabolic panel     Status: Abnormal   Result Value Ref Range    Sodium 138 135 - 145 mmol/L    Potassium 4.1 3.4 - 5.3 mmol/L    Carbon Dioxide (CO2) 24 22 - 29 mmol/L    Anion Gap 12 7 - 15 mmol/L    Urea Nitrogen 9.2 6.0 - 20.0 mg/dL    Creatinine 0.53 0.51 - 0.95 mg/dL    GFR Estimate >90 >60 mL/min/1.73m2    Calcium 8.7 8.6 - 10.0 mg/dL    Chloride 102 98 - 107 mmol/L    Glucose 90 70 - 99 mg/dL    Alkaline Phosphatase 67 40 - 150 U/L    AST 62 (H) 0 - 45 U/L    ALT 37 0 - 50 U/L    Protein Total 7.1 6.4 - 8.3 g/dL    Albumin 4.2 3.5 - 5.2 g/dL    Bilirubin Total 3.0 (H) <=1.2 mg/dL   Reticulocyte count     Status: Abnormal   Result Value Ref Range    % Reticulocyte 24.2 (H) 0.5 - 2.0 %    Absolute Reticulocyte 0.662 (H) 0.025 - 0.095 10e6/uL   CBC with platelets and differential     Status: Abnormal   Result Value Ref Range    WBC Count 10.6 4.0 - 11.0 10e3/uL    RBC Count 2.78 (L) 3.80 - 5.20 10e6/uL    Hemoglobin 8.6 (L) 11.7 - 15.7 g/dL    Hematocrit 24.4 (L) 35.0 - 47.0 %    MCV 88 78 - 100 fL    MCH 30.9 26.5 - 33.0 pg    MCHC 35.2 31.5 - 36.5 g/dL    RDW 26.9 (H) 10.0 - 15.0 %    Platelet  Count 390 150 - 450 10e3/uL    % Neutrophils 62 %    % Lymphocytes 21 %    % Monocytes 8 %    % Eosinophils 7 %    % Basophils 2 %    % Immature Granulocytes 0 %    NRBCs per 100 WBC 5 (H) <1 /100    Absolute Neutrophils 6.3 1.6 - 8.3 10e3/uL    Absolute Lymphocytes 2.2 0.8 - 5.3 10e3/uL    Absolute Monocytes 0.8 0.0 - 1.3 10e3/uL    Absolute Eosinophils 0.7 0.0 - 0.7 10e3/uL    Absolute Basophils 0.2 0.0 - 0.2 10e3/uL    Absolute Immature Granulocytes 0.0 <=0.4 10e3/uL    Absolute NRBCs 0.5 10e3/uL   CBC with Platelets & Differential     Status: Abnormal    Narrative    The following orders were created for panel order CBC with Platelets & Differential.  Procedure                               Abnormality         Status                     ---------                               -----------         ------                     CBC with platelets and d...[[id number]]  Abnormal            Final result                 Please view results for these tests on the individual orders.     Medications   HYDROmorphone (DILAUDID) injection 1 mg (1 mg Intravenous $Given 2/9/24 2240)   lactated ringers infusion ( Intravenous Rate/Dose Verify 2/9/24 1879)   ketorolac (TORADOL) injection 30 mg (30 mg Intravenous $Given 2/9/24 2111)   ondansetron (ZOFRAN) injection 8 mg (8 mg Intravenous $Given 2/9/24 2140)     Labs Ordered and Resulted from Time of ED Arrival to Time of ED Departure   COMPREHENSIVE METABOLIC PANEL - Abnormal       Result Value    Sodium 138      Potassium 4.1      Carbon Dioxide (CO2) 24      Anion Gap 12      Urea Nitrogen 9.2      Creatinine 0.53      GFR Estimate >90      Calcium 8.7      Chloride 102      Glucose 90      Alkaline Phosphatase 67      AST 62 (*)     ALT 37      Protein Total 7.1      Albumin 4.2      Bilirubin Total 3.0 (*)    RETICULOCYTE COUNT - Abnormal    % Reticulocyte 24.2 (*)     Absolute Reticulocyte 0.662 (*)    CBC WITH PLATELETS AND DIFFERENTIAL - Abnormal    WBC Count 10.6      RBC  Count 2.78 (*)     Hemoglobin 8.6 (*)     Hematocrit 24.4 (*)     MCV 88      MCH 30.9      MCHC 35.2      RDW 26.9 (*)     Platelet Count 390      % Neutrophils 62      % Lymphocytes 21      % Monocytes 8      % Eosinophils 7      % Basophils 2      % Immature Granulocytes 0      NRBCs per 100 WBC 5 (*)     Absolute Neutrophils 6.3      Absolute Lymphocytes 2.2      Absolute Monocytes 0.8      Absolute Eosinophils 0.7      Absolute Basophils 0.2      Absolute Immature Granulocytes 0.0      Absolute NRBCs 0.5       No orders to display          Critical care was not performed.     Medical Decision Making  The patient's presentation was of high complexity (a chronic illness severe exacerbation, progression, or side effect of treatment).    The patient's evaluation involved:  review of external note(s) from 2 sources (see separate area of note for details)  review of 1 test result(s) ordered prior to this encounter (see separate area of note for details)  ordering and/or review of 3+ test(s) in this encounter (see separate area of note for details)    The patient's management necessitated high risk (a parenteral controlled substance).    Assessment & Plan    Patient presents for the above complaints.  On my evaluation she is alert, cooperative, no acute distress.  Blood pressure is 119/85, pulse is 89, respiratory rate is 16, sats 93% on room air.  She is alert and cooperative during the exam.  She is tender to palpation in the lower lumbar region and over the left hand.     Patient does have a care plan in her chart which we did follow.  She had Dilaudid 1 mg IV every hour x 3 doses ordered in addition to Toradol 30 mg IV x 1, maintenance IV fluids with LR, and Zofran 8 mg IV as needed nausea x 1.  CBC shows a normal white count 10.6, hemoglobin is 8.6, up from 7.0 4 days ago (patient got a blood transfusion yesterday) and normal platelet count, CMP is normal except for AST of 62 (consistent with previous), elevated  bilirubin at 3.0 (down from previous), reticulocyte count elevated at 24.2, consistent with previous sickle cell flares.    Upon reevaluation, after her care plan was given, she is feeling improved and does feel that she can go home.  We will discharge her to home, advised to follow-up with her hematology clinic, she can also contact the infusion clinic if she is having increased pain.  Patient verbalizes understanding.    I have reviewed the nursing notes. I have reviewed the findings, diagnosis, plan and need for follow up with the patient.    New Prescriptions    No medications on file       Final diagnoses:   Sickle cell pain crisis (H)       Leslie Roper MD  [facility] EMERGENCY DEPARTMENT  2/9/2024     Leslie Roper MD  02/09/24 3022

## 2024-02-10 NOTE — ED NOTES
Pt O2 sats down to 88-89% while pt is resting, they come up when pt is talking. Pt was placed on 2L of O2, to help when she is resting. Sats recovered to 97%.

## 2024-02-10 NOTE — DISCHARGE INSTRUCTIONS
You have been seen in the emergency department today for your symptoms.  Your hemoglobin is 8.6 today.  This is very good for you.  We have given you your care plan and you have indicated that you are feeling better.  Please continue to take your regular medications.  Follow-up with your hematology clinic.  You can always contact the infusion clinic during the week if you are having increasing symptoms or return to the emergency department.

## 2024-02-10 NOTE — ED NOTES
Pt reports generalized body pain. States that her home pain meds didn't do enough to help with the pain. Recent blood transfusion that worked to help with her pain for a couple of days, but the pain is now returning. She took her home pain meds around 1700.

## 2024-02-12 ENCOUNTER — INFUSION THERAPY VISIT (OUTPATIENT)
Dept: INFUSION THERAPY | Facility: CLINIC | Age: 24
End: 2024-02-12
Attending: PEDIATRICS
Payer: COMMERCIAL

## 2024-02-12 ENCOUNTER — NURSE TRIAGE (OUTPATIENT)
Dept: ONCOLOGY | Facility: CLINIC | Age: 24
End: 2024-02-12
Payer: COMMERCIAL

## 2024-02-12 VITALS
OXYGEN SATURATION: 94 % | TEMPERATURE: 98.2 F | RESPIRATION RATE: 16 BRPM | SYSTOLIC BLOOD PRESSURE: 120 MMHG | DIASTOLIC BLOOD PRESSURE: 74 MMHG | HEART RATE: 89 BPM

## 2024-02-12 DIAGNOSIS — G81.10 SPASTIC HEMIPLEGIA, UNSPECIFIED ETIOLOGY, UNSPECIFIED LATERALITY (H): ICD-10-CM

## 2024-02-12 DIAGNOSIS — D57.00 SICKLE CELL PAIN CRISIS (H): Primary | ICD-10-CM

## 2024-02-12 PROCEDURE — 258N000003 HC RX IP 258 OP 636: Performed by: PEDIATRICS

## 2024-02-12 PROCEDURE — 250N000011 HC RX IP 250 OP 636: Performed by: PEDIATRICS

## 2024-02-12 PROCEDURE — 96376 TX/PRO/DX INJ SAME DRUG ADON: CPT

## 2024-02-12 PROCEDURE — 96361 HYDRATE IV INFUSION ADD-ON: CPT

## 2024-02-12 PROCEDURE — 96374 THER/PROPH/DIAG INJ IV PUSH: CPT

## 2024-02-12 PROCEDURE — 96375 TX/PRO/DX INJ NEW DRUG ADDON: CPT

## 2024-02-12 PROCEDURE — 250N000013 HC RX MED GY IP 250 OP 250 PS 637: Performed by: PEDIATRICS

## 2024-02-12 RX ORDER — ONDANSETRON 2 MG/ML
8 INJECTION INTRAMUSCULAR; INTRAVENOUS EVERY 6 HOURS PRN
Status: DISCONTINUED | OUTPATIENT
Start: 2024-02-12 | End: 2024-02-12 | Stop reason: HOSPADM

## 2024-02-12 RX ORDER — ONDANSETRON 2 MG/ML
8 INJECTION INTRAMUSCULAR; INTRAVENOUS EVERY 6 HOURS PRN
Status: CANCELLED
Start: 2024-04-01

## 2024-02-12 RX ORDER — KETOROLAC TROMETHAMINE 30 MG/ML
30 INJECTION, SOLUTION INTRAMUSCULAR; INTRAVENOUS ONCE
Status: COMPLETED | OUTPATIENT
Start: 2024-02-12 | End: 2024-02-12

## 2024-02-12 RX ORDER — DIPHENHYDRAMINE HCL 25 MG
50 CAPSULE ORAL
Status: COMPLETED | OUTPATIENT
Start: 2024-02-12 | End: 2024-02-12

## 2024-02-12 RX ORDER — KETOROLAC TROMETHAMINE 30 MG/ML
30 INJECTION, SOLUTION INTRAMUSCULAR; INTRAVENOUS ONCE
Status: CANCELLED
Start: 2024-04-01 | End: 2024-04-01

## 2024-02-12 RX ORDER — HEPARIN SODIUM (PORCINE) LOCK FLUSH IV SOLN 100 UNIT/ML 100 UNIT/ML
5 SOLUTION INTRAVENOUS
Status: CANCELLED | OUTPATIENT
Start: 2024-04-01

## 2024-02-12 RX ORDER — DIPHENHYDRAMINE HCL 25 MG
50 CAPSULE ORAL
Status: CANCELLED
Start: 2024-04-01

## 2024-02-12 RX ORDER — HEPARIN SODIUM,PORCINE 10 UNIT/ML
5 VIAL (ML) INTRAVENOUS
Status: CANCELLED | OUTPATIENT
Start: 2024-04-01

## 2024-02-12 RX ORDER — HEPARIN SODIUM (PORCINE) LOCK FLUSH IV SOLN 100 UNIT/ML 100 UNIT/ML
5 SOLUTION INTRAVENOUS
Status: DISCONTINUED | OUTPATIENT
Start: 2024-02-12 | End: 2024-02-12 | Stop reason: HOSPADM

## 2024-02-12 RX ORDER — ONDANSETRON 4 MG/1
8 TABLET, FILM COATED ORAL
Status: CANCELLED
Start: 2024-04-01

## 2024-02-12 RX ADMIN — ONDANSETRON 8 MG: 2 INJECTION INTRAMUSCULAR; INTRAVENOUS at 09:43

## 2024-02-12 RX ADMIN — DIPHENHYDRAMINE HYDROCHLORIDE 50 MG: 25 CAPSULE ORAL at 09:39

## 2024-02-12 RX ADMIN — KETOROLAC TROMETHAMINE 30 MG: 30 INJECTION, SOLUTION INTRAMUSCULAR; INTRAVENOUS at 09:40

## 2024-02-12 RX ADMIN — HYDROMORPHONE HYDROCHLORIDE 1 MG: 1 INJECTION, SOLUTION INTRAMUSCULAR; INTRAVENOUS; SUBCUTANEOUS at 09:41

## 2024-02-12 RX ADMIN — Medication 5 ML: at 12:21

## 2024-02-12 RX ADMIN — HYDROMORPHONE HYDROCHLORIDE 1 MG: 1 INJECTION, SOLUTION INTRAMUSCULAR; INTRAVENOUS; SUBCUTANEOUS at 10:43

## 2024-02-12 RX ADMIN — HYDROMORPHONE HYDROCHLORIDE 1 MG: 1 INJECTION, SOLUTION INTRAMUSCULAR; INTRAVENOUS; SUBCUTANEOUS at 11:43

## 2024-02-12 RX ADMIN — SODIUM CHLORIDE, POTASSIUM CHLORIDE, SODIUM LACTATE AND CALCIUM CHLORIDE 1000 ML: 600; 310; 30; 20 INJECTION, SOLUTION INTRAVENOUS at 09:38

## 2024-02-12 NOTE — TELEPHONE ENCOUNTER
Oncology Nurse Triage - Sickle Cell Pain Crisis:  Situation: Jennifer  calling about Sickle Cell Pain Crisis, requesting to be added on for IV fluids and pain medicine    Background:   Patient's last infusion was 8/23/23  Last clinic visit date:8/11/23 with Patricia Mantilla CNP  Does patient have active treatment plan?  Yes    Assessment of Symptoms:  Onset/Duration of symptoms: 1 day    Is it typical sickle cell pain? Yes   Location: lower back and L side  Character: Sharp           Intensity: 9/10    Any radiation of pain, numbness, tingling, weakness, warmth, swelling, discoloration of arms or legs?No     Fever?No  Chest Pain Present: No   Shortness of breath: No     Other home therapies tried: HEAT/HEATING PAD and WARM BATH     Last home medication taken and when: 0600, oxycodone 15 mg and tylenol  Any Refills Needed?: Yes Needs refill of oxy, out this afternoon, no side effects, pended in separate refill encounter.    Does patient have transportation & length of time to get to clinic: No 25 minutes    Recommendations:   If you do not hear from the infusion center by 2pm then you will not be able to get in for an infusion today. If symptoms worsen while waiting for call back, and/or you experience fever, chills, SOB, chest pain, cough, n/v, dizziness, numbness, swelling, discoloration of extremities, then seek emergency evaluation in Emergency Department.     Pt voiced understanding of above.    Added to infusion wait list.      0709: [facility] can offer apt to Jennifer after 0830.  0732: Spoke to Jennifer who confirmed she can come and will call medical transportation to see if she can arrange a ride.  0746: Per Jennifer, cab can pick her up at 0845 and she can be here at 0930.  Updated Infusion charge nurse.  Message sent to CCOD to schedule.  Message routed to Care Team.       Yes - the patient is able to be screened

## 2024-02-12 NOTE — PROGRESS NOTES
Chief Complaint   Patient presents with    Infusion     IV fluids, pain meds     Infusion Nursing Note:  Jennifer Cervantes presents today for IV fluids, pain meds.    Patient seen by provider today: No   present during visit today: Not Applicable.    Note:   Pain 9/10 upon arrival. 5/10   1 L LR infused over 2 hours.      Intravenous Access:  Implanted Port.  No labs due.    Treatment Conditions:  Confirmed with care team for treatment today.   Patient has ride home.      Post Infusion Assessment:  Patient tolerated infusion without incident.  Patient observed for 30 minutes post last dose of Dilaudid per pt request.   Blood return noted pre and post infusion.  Site patent and intact, free from redness, edema or discomfort.  No evidence of extravasations.  Access discontinued per protocol.       Discharge Plan:   AVS to patient via MYCHART.  Patient will follow up with team.   Patient discharged in stable condition accompanied by: self.  Departure Mode: Ambulatory.      Administrations This Visit       diphenhydrAMINE (BENADRYL) capsule 50 mg       Admin Date  02/12/2024 Action  $Given Dose  50 mg Route  Oral Documented By  Tricia Adame RN              heparin 100 unit/mL injection 5 mL       Admin Date  02/12/2024 Action  $Given Dose  5 mL Route  Intracatheter Documented By  Tricia Adame RN              HYDROmorphone (DILAUDID) injection 1 mg       Admin Date  02/12/2024 Action  $Given Dose  1 mg Route  Intravenous Documented By  Tricia Adame RN               Admin Date  02/12/2024 Action  $Given Dose  1 mg Route  Intravenous Documented By  Tricia Adame RN               Admin Date  02/12/2024 Action  $Given Dose  1 mg Route  Intravenous Documented By  Tricia Adame RN              ketorolac (TORADOL) injection 30 mg       Admin Date  02/12/2024 Action  $Given Dose  30 mg Route  Intravenous Documented By  Tricia Adaem RN              lactated ringers BOLUS 1,000 mL       Admin  "Date  02/12/2024 Action  $New Bag Dose  1,000 mL Rate  500 mL/hr Route  Intravenous Documented By  Tricia Adame, RN              ondansetron (ZOFRAN) injection 8 mg       Admin Date  02/12/2024 Action  $Given Dose  8 mg Route  Intravenous Documented By  Tricia Adame, RN                    Vital signs:  Temp: 98.2  F (36.8  C) Temp src: Oral BP: 125/82 Pulse: 92   Resp: 18 SpO2: 93 % O2 Device: None (Room air)        Estimated body mass index is 25.4 kg/m  as calculated from the following:    Height as of 2/3/24: 1.626 m (5' 4\").    Weight as of 2/3/24: 67.1 kg (148 lb).          "

## 2024-02-12 NOTE — TELEPHONE ENCOUNTER
[facility] can offer a 0900 apt to pt.  Called pt to offer apt.  0805: Jennifer will set up uber and asked this writer to call her back to see if she is able to get here by 0900    0815: Called Jennifer, she can get here by 0915.   Called [facility] and okay for her to come at 0915; will schedule at 0930  Informed Jennifer it is okay for her to come at 0930.  Pt voiced understanding of this information.    Updated given to [facility] charge nurse.  Message sent to CCOD to schedule.  Routed to Care Team.

## 2024-02-12 NOTE — TELEPHONE ENCOUNTER
Oncology Nurse Triage - Sickle Cell Pain Crisis:  Situation: Jennifer  calling about Sickle Cell Pain Crisis, requesting to be added on for IV fluids and pain medicine    Background:   Patient's last infusion was 2/9/24 in ED  Last clinic visit date:2/5/24 w/ Patricia Mantilla  Does patient have active treatment plan?  Yes    Assessment of Symptoms:  Onset/Duration of symptoms: 3 days    Is it typical sickle cell pain? Yes   Location: L arm, lower back  Character: Sharp           Intensity: 10/10    Any radiation of pain, numbness, tingling, weakness, warmth, swelling, discoloration of arms or legs?Yes -pain only    Fever?No    Chest Pain Present: No     Shortness of breath: No     Other home therapies tried: HEAT/HEATING PAD     Last home medication taken and when: around 0600 Oxycodone, methocarbamol    Any Refills Needed?: No     Does patient have transportation & length of time to get to clinic: Yes -states if morning appt comes up, she could get her own ride in, otherwise would needs transportation set up.    Recommendations:   Added to infusion wait list.    If you do not hear from the infusion center by 2pm then you will not be able to get in for an infusion today. If symptoms worsen while waiting for call back, and/or you experience fever, chills, SOB, chest pain, cough, n/v, dizziness, numbness, swelling, discoloration of extremities, then seek emergency evaluation in Emergency Department.     Please note, if you are late for your appt, you risk losing your infusion appt as it may delay another patient's infusion who arrived on time.

## 2024-02-13 ENCOUNTER — TELEPHONE (OUTPATIENT)
Dept: PULMONOLOGY | Facility: CLINIC | Age: 24
End: 2024-02-13
Payer: COMMERCIAL

## 2024-02-13 NOTE — TELEPHONE ENCOUNTER
Patient Contacted for the patient to call back and schedule the following:    Appointment type: RTA  Provider: FLORA  Return date: 05/31/2024  Specialty phone number: [phone]  Additional appointment(s) needed: N/A  Additonal Notes: Scheduled in first available to complete testing beforehand

## 2024-02-14 ENCOUNTER — PATIENT OUTREACH (OUTPATIENT)
Dept: CARE COORDINATION | Facility: CLINIC | Age: 24
End: 2024-02-14
Payer: COMMERCIAL

## 2024-02-14 ENCOUNTER — NURSE TRIAGE (OUTPATIENT)
Dept: ONCOLOGY | Facility: CLINIC | Age: 24
End: 2024-02-14
Payer: COMMERCIAL

## 2024-02-14 DIAGNOSIS — D57.00 SICKLE CELL PAIN CRISIS (H): ICD-10-CM

## 2024-02-14 RX ORDER — OXYCODONE HYDROCHLORIDE 10 MG/1
10 TABLET ORAL EVERY 4 HOURS PRN
Qty: 20 TABLET | Refills: 0 | Status: SHIPPED | OUTPATIENT
Start: 2024-02-16 | End: 2024-02-22

## 2024-02-14 NOTE — PROGRESS NOTES
Social Work - Follow-Up  [facility]    Data/Intervention:    Patient Name: Jennifer Cervantes Goes By: Jennifer MARTIN/Age: 2000 (23 year old)    Reason for Follow-Up: Return phone call     Collaborated With:    -JOE Tuttle Care Coordinator through [employer] MA     Intervention/Education/Resources Provided:  UDAY received VM from JOE Tuttle when out of office requesting call back from patients care coordinator through [facility].    SW connected with JOE Tuttle who wanted to discuss some care gaps. Most of the questions Marry had were related to patients medical care. UDAY explained role with patient and that most of the questions have to be deferred to patient medical care team.     SW will reach out to Arely Krueger RNCC to follow up with Marry.    Assessment/Plan:  SW will continue to remain available as needed. Previously provided patient/family with writer's contact information and availability.      CARLOS Chavez,UDAY  Hematology/Oncology Social Worker  Phone:[phone] Pager: [phone]

## 2024-02-14 NOTE — CONFIDENTIAL NOTE
Oncology Nurse Triage - Sickle Cell Pain Crisis:    Situation: Jennifer  calling about Sickle Cell Pain Crisis, requesting to be added on for IV fluids and pain medicine    Background:     Patient's last infusion was 2/12  Last clinic visit date:2/5  Does patient have active treatment plan?  Yes      Assessment of Symptoms:  Onset/Duration of symptoms: 1 day    Is it typical sickle cell pain? Yes   Location: Generalized  Character: Sharp           Intensity: moderate    Any radiation of pain, numbness, tingling, weakness, warmth, swelling, discoloration of arms or legs?No     Fever?No  (if yes max temperature recorded in last 24 hours):      Chest Pain Present: No     Shortness of breath: No     Other home therapies tried: HEAT/HEATING PAD     Last home medication taken and when: Today at 6:00 am    Any Refills Needed?: Yes     Does patient have transportation & length of time to get to clinic: No         Recommendations:     Approved by Patricia Mantilla NP for infusion today.    If you do not hear from the infusion center by 2pm then you will not be able to get in for an infusion today. If symptoms worsen while waiting for call back, and/or you experience fever, chills, SOB, chest pain, cough, n/v, dizziness, numbness, swelling, discoloration of extremities, then seek emergency evaluation in Emergency Department.     Please note, if you are late for your appt, you risk losing your infusion appt as it may delay another patient's infusion who arrived on time.

## 2024-02-14 NOTE — TELEPHONE ENCOUNTER
Narcotic Refill Request    Medication(s) requested:  oxy  Person Requesting Refill:patient  What pain is the medication treating: sickle cell  How is the medication being taken?: as prescribed  Does pt have enough for today? yes  Is pain being adequately controlled on the current regimen?: yes  Experiencing any side effects from medication?: no    Date of most recent appointment:  2/5/24  Any No Show Visits:no  Next appointment:   2/23  Last fill date and by whom:  Patricia Mantilla NP on 2/9   Reviewed: no    Send to provider: Jose Rafael

## 2024-02-15 ENCOUNTER — INFUSION THERAPY VISIT (OUTPATIENT)
Dept: INFUSION THERAPY | Facility: CLINIC | Age: 24
End: 2024-02-15
Attending: INTERNAL MEDICINE
Payer: COMMERCIAL

## 2024-02-15 ENCOUNTER — NURSE TRIAGE (OUTPATIENT)
Dept: ONCOLOGY | Facility: CLINIC | Age: 24
End: 2024-02-15
Payer: COMMERCIAL

## 2024-02-15 ENCOUNTER — PATIENT OUTREACH (OUTPATIENT)
Dept: CARE COORDINATION | Facility: CLINIC | Age: 24
End: 2024-02-15
Payer: COMMERCIAL

## 2024-02-15 VITALS
SYSTOLIC BLOOD PRESSURE: 105 MMHG | HEART RATE: 72 BPM | OXYGEN SATURATION: 92 % | DIASTOLIC BLOOD PRESSURE: 60 MMHG | RESPIRATION RATE: 16 BRPM | TEMPERATURE: 97.5 F

## 2024-02-15 DIAGNOSIS — D57.00 SICKLE CELL PAIN CRISIS (H): Primary | ICD-10-CM

## 2024-02-15 DIAGNOSIS — G81.10 SPASTIC HEMIPLEGIA, UNSPECIFIED ETIOLOGY, UNSPECIFIED LATERALITY (H): ICD-10-CM

## 2024-02-15 PROCEDURE — 250N000011 HC RX IP 250 OP 636: Performed by: PEDIATRICS

## 2024-02-15 PROCEDURE — 258N000003 HC RX IP 258 OP 636: Performed by: PEDIATRICS

## 2024-02-15 PROCEDURE — 96374 THER/PROPH/DIAG INJ IV PUSH: CPT

## 2024-02-15 PROCEDURE — 250N000013 HC RX MED GY IP 250 OP 250 PS 637: Performed by: PEDIATRICS

## 2024-02-15 PROCEDURE — 96375 TX/PRO/DX INJ NEW DRUG ADDON: CPT

## 2024-02-15 PROCEDURE — 96376 TX/PRO/DX INJ SAME DRUG ADON: CPT

## 2024-02-15 PROCEDURE — 96361 HYDRATE IV INFUSION ADD-ON: CPT

## 2024-02-15 RX ORDER — KETOROLAC TROMETHAMINE 30 MG/ML
30 INJECTION, SOLUTION INTRAMUSCULAR; INTRAVENOUS ONCE
Status: COMPLETED | OUTPATIENT
Start: 2024-02-15 | End: 2024-02-15

## 2024-02-15 RX ORDER — HEPARIN SODIUM,PORCINE 10 UNIT/ML
5 VIAL (ML) INTRAVENOUS
Status: CANCELLED | OUTPATIENT
Start: 2024-04-01

## 2024-02-15 RX ORDER — ONDANSETRON 2 MG/ML
8 INJECTION INTRAMUSCULAR; INTRAVENOUS EVERY 6 HOURS PRN
Status: CANCELLED
Start: 2024-04-01

## 2024-02-15 RX ORDER — ONDANSETRON 2 MG/ML
8 INJECTION INTRAMUSCULAR; INTRAVENOUS EVERY 6 HOURS PRN
Status: DISCONTINUED | OUTPATIENT
Start: 2024-02-15 | End: 2024-02-15 | Stop reason: HOSPADM

## 2024-02-15 RX ORDER — DIPHENHYDRAMINE HCL 25 MG
50 CAPSULE ORAL
Status: CANCELLED
Start: 2024-04-01

## 2024-02-15 RX ORDER — DIPHENHYDRAMINE HCL 25 MG
50 CAPSULE ORAL
Status: COMPLETED | OUTPATIENT
Start: 2024-02-15 | End: 2024-02-15

## 2024-02-15 RX ORDER — HEPARIN SODIUM (PORCINE) LOCK FLUSH IV SOLN 100 UNIT/ML 100 UNIT/ML
5 SOLUTION INTRAVENOUS
Status: DISCONTINUED | OUTPATIENT
Start: 2024-02-15 | End: 2024-02-15 | Stop reason: HOSPADM

## 2024-02-15 RX ORDER — HEPARIN SODIUM (PORCINE) LOCK FLUSH IV SOLN 100 UNIT/ML 100 UNIT/ML
5 SOLUTION INTRAVENOUS
Status: CANCELLED | OUTPATIENT
Start: 2024-04-01

## 2024-02-15 RX ORDER — ONDANSETRON 4 MG/1
8 TABLET, FILM COATED ORAL
Status: CANCELLED
Start: 2024-04-01

## 2024-02-15 RX ORDER — HEPARIN SODIUM,PORCINE 10 UNIT/ML
5 VIAL (ML) INTRAVENOUS
Status: DISCONTINUED | OUTPATIENT
Start: 2024-02-15 | End: 2024-02-15 | Stop reason: HOSPADM

## 2024-02-15 RX ORDER — KETOROLAC TROMETHAMINE 30 MG/ML
30 INJECTION, SOLUTION INTRAMUSCULAR; INTRAVENOUS ONCE
Status: CANCELLED
Start: 2024-04-01 | End: 2024-04-01

## 2024-02-15 RX ADMIN — SODIUM CHLORIDE, POTASSIUM CHLORIDE, SODIUM LACTATE AND CALCIUM CHLORIDE 1000 ML: 600; 310; 30; 20 INJECTION, SOLUTION INTRAVENOUS at 11:24

## 2024-02-15 RX ADMIN — KETOROLAC TROMETHAMINE 30 MG: 30 INJECTION, SOLUTION INTRAMUSCULAR; INTRAVENOUS at 11:39

## 2024-02-15 RX ADMIN — HYDROMORPHONE HYDROCHLORIDE 1 MG: 1 INJECTION, SOLUTION INTRAMUSCULAR; INTRAVENOUS; SUBCUTANEOUS at 13:56

## 2024-02-15 RX ADMIN — ONDANSETRON 8 MG: 2 INJECTION INTRAMUSCULAR; INTRAVENOUS at 11:37

## 2024-02-15 RX ADMIN — DIPHENHYDRAMINE HYDROCHLORIDE 50 MG: 25 CAPSULE ORAL at 11:36

## 2024-02-15 RX ADMIN — HYDROMORPHONE HYDROCHLORIDE 1 MG: 1 INJECTION, SOLUTION INTRAMUSCULAR; INTRAVENOUS; SUBCUTANEOUS at 11:40

## 2024-02-15 RX ADMIN — Medication 5 ML: at 13:58

## 2024-02-15 RX ADMIN — HYDROMORPHONE HYDROCHLORIDE 1 MG: 1 INJECTION, SOLUTION INTRAMUSCULAR; INTRAVENOUS; SUBCUTANEOUS at 12:50

## 2024-02-15 ASSESSMENT — PAIN SCALES - GENERAL: PAINLEVEL: EXTREME PAIN (9)

## 2024-02-15 NOTE — TELEPHONE ENCOUNTER
"Oncology Nurse Triage - Sickle Cell Pain Crisis:  Situation: Jennifer  calling about Sickle Cell Pain Crisis, requesting to be added on for IV fluids and pain medicine    Background:   Patient's last infusion was 2/12/24  Last clinic visit date:2/5/24 w/ Patricia Mantilla  Does patient have active treatment plan?  Yes    Assessment of Symptoms:  Onset/Duration of symptoms: 2 day    Is it typical sickle cell pain? Yes   Location: \"every where\"  Character: Sharp           Intensity: 10/10    Any radiation of pain, numbness, tingling, weakness, warmth, swelling, discoloration of arms or legs?Yes -pain only    Fever?No    Chest Pain Present: No     Shortness of breath: No     Other home therapies tried: HEAT/HEATING PAD and WARM BATH     Last home medication taken and when: 0600 methocarbamol and IBU    Any Refills Needed?: No     Does patient have transportation & length of time to get to clinic: No -needs transportation    Recommendations:   Added to infusion wait list.   0835 call to pt to offer 1130 appt at [facility], okayed by charge nurse, Uyen. Pt accepting of this. Writer informed will send message to SW to assist w/ transportation. Pt wondering if transport could bring her to [address] [place] [address] to  medications after infusion appt, then home. Writer informed will ask SW and ask them to call her to confirm. Pt voiced understanding.   1241 message sent to .     If you do not hear from the infusion center by 2pm then you will not be able to get in for an infusion today. If symptoms worsen while waiting for call back, and/or you experience fever, chills, SOB, chest pain, cough, n/v, dizziness, numbness, swelling, discoloration of extremities, then seek emergency evaluation in Emergency Department.     Please note, if you are late for your appt, you risk losing your infusion appt as it may delay another patient's infusion who arrived on time.          "

## 2024-02-15 NOTE — PROGRESS NOTES
Social Work - Transportation  [facility]    Data/Intervention:  Patient Name: Jennifer Cervantes Goes By: Jennifer    /Age: 2000 (23 year old)    Referral From: [facility] Triage  Reason for Referral:  support requested for patient transportation needs for 02/15/2024.  Assessment:  called [employer] to arrange ride through patient's insurance. [facility] [employer] arranged  for patient from home with [employer]. Patient will need to call [phone] when ready for return ride home.  Plan: Patient is aware of the transportation plan.  available to assist with any other needs.    CARLOS Chavez,[facility]  Hematology/Oncology Social Worker  Phone:[phone] Pager: [phone]

## 2024-02-15 NOTE — PROGRESS NOTES
Infusion Nursing Note:  Jennifer Cervantes presents today for IVF, pain meds.    Patient seen by provider today: No   present during visit today: Not Applicable.    Note: Benadryl, Zofran IV, Toradol & IV dilaudid (x3 doses) given today.   Pain upon arrival: 9/10 generalized, sharp.   Pain on departure: 5/10 generalized.       Intravenous Access:  Implanted Port.    Treatment Conditions:  Not Applicable.      Post Infusion Assessment:  Patient tolerated infusion without incident.  Blood return noted pre and post infusion.  Site patent and intact, free from redness, edema or discomfort.  No evidence of extravasations.  Access discontinued per protocol.       Discharge Plan:   Discharge instructions reviewed with: Patient.  Patient and/or family verbalized understanding of discharge instructions and all questions answered.  AVS to patient via [employer].  Patient will return PRN for next appointment.   Patient discharged in stable condition accompanied by: self.  Departure Mode: Ambulatory.      Earnestine Sawyer RN

## 2024-02-16 ENCOUNTER — NURSE TRIAGE (OUTPATIENT)
Dept: NURSING | Facility: CLINIC | Age: 24
End: 2024-02-16
Payer: COMMERCIAL

## 2024-02-16 ENCOUNTER — HOSPITAL ENCOUNTER (EMERGENCY)
Facility: CLINIC | Age: 24
Discharge: HOME | End: 2024-02-17
Attending: EMERGENCY MEDICINE | Admitting: EMERGENCY MEDICINE
Payer: COMMERCIAL

## 2024-02-16 VITALS
HEART RATE: 105 BPM | RESPIRATION RATE: 16 BRPM | TEMPERATURE: 98.7 F | SYSTOLIC BLOOD PRESSURE: 120 MMHG | DIASTOLIC BLOOD PRESSURE: 69 MMHG | OXYGEN SATURATION: 92 %

## 2024-02-16 DIAGNOSIS — D57.00 SICKLE CELL PAIN CRISIS (H): ICD-10-CM

## 2024-02-16 PROCEDURE — 96376 TX/PRO/DX INJ SAME DRUG ADON: CPT | Performed by: EMERGENCY MEDICINE

## 2024-02-16 PROCEDURE — 99284 EMERGENCY DEPT VISIT MOD MDM: CPT | Mod: 25 | Performed by: EMERGENCY MEDICINE

## 2024-02-16 PROCEDURE — 96374 THER/PROPH/DIAG INJ IV PUSH: CPT | Performed by: EMERGENCY MEDICINE

## 2024-02-16 PROCEDURE — 96375 TX/PRO/DX INJ NEW DRUG ADDON: CPT | Performed by: EMERGENCY MEDICINE

## 2024-02-16 PROCEDURE — 99284 EMERGENCY DEPT VISIT MOD MDM: CPT | Performed by: EMERGENCY MEDICINE

## 2024-02-16 PROCEDURE — 96361 HYDRATE IV INFUSION ADD-ON: CPT | Performed by: EMERGENCY MEDICINE

## 2024-02-17 LAB
ALBUMIN SERPL BCG-MCNC: 4.6 G/DL (ref 3.5–5.2)
ALP SERPL-CCNC: 65 U/L (ref 40–150)
ALT SERPL W P-5'-P-CCNC: 25 U/L (ref 0–50)
ANION GAP SERPL CALCULATED.3IONS-SCNC: 11 MMOL/L (ref 7–15)
AST SERPL W P-5'-P-CCNC: 54 U/L (ref 0–45)
BASOPHILS # BLD AUTO: ABNORMAL 10*3/UL
BASOPHILS # BLD MANUAL: 0.4 10E3/UL (ref 0–0.2)
BASOPHILS NFR BLD AUTO: ABNORMAL %
BASOPHILS NFR BLD MANUAL: 3 %
BILIRUB SERPL-MCNC: 2.9 MG/DL
BUN SERPL-MCNC: 18.7 MG/DL (ref 6–20)
CALCIUM SERPL-MCNC: 8.9 MG/DL (ref 8.6–10)
CHLORIDE SERPL-SCNC: 102 MMOL/L (ref 98–107)
CREAT SERPL-MCNC: 0.65 MG/DL (ref 0.51–0.95)
DEPRECATED HCO3 PLAS-SCNC: 24 MMOL/L (ref 22–29)
EGFRCR SERPLBLD CKD-EPI 2021: >90 ML/MIN/1.73M2
EOSINOPHIL # BLD AUTO: ABNORMAL 10*3/UL
EOSINOPHIL # BLD MANUAL: 0.4 10E3/UL (ref 0–0.7)
EOSINOPHIL NFR BLD AUTO: ABNORMAL %
EOSINOPHIL NFR BLD MANUAL: 3 %
ERYTHROCYTE [DISTWIDTH] IN BLOOD BY AUTOMATED COUNT: 25.9 % (ref 10–15)
GLUCOSE SERPL-MCNC: 86 MG/DL (ref 70–99)
HCT VFR BLD AUTO: 21.4 % (ref 35–47)
HGB BLD-MCNC: 7.6 G/DL (ref 11.7–15.7)
IMM GRANULOCYTES # BLD: ABNORMAL 10*3/UL
IMM GRANULOCYTES NFR BLD: ABNORMAL %
LYMPHOCYTES # BLD AUTO: ABNORMAL 10*3/UL
LYMPHOCYTES # BLD MANUAL: 5 10E3/UL (ref 0.8–5.3)
LYMPHOCYTES NFR BLD AUTO: ABNORMAL %
LYMPHOCYTES NFR BLD MANUAL: 34 %
MCH RBC QN AUTO: 30.2 PG (ref 26.5–33)
MCHC RBC AUTO-ENTMCNC: 35.5 G/DL (ref 31.5–36.5)
MCV RBC AUTO: 85 FL (ref 78–100)
MONOCYTES # BLD AUTO: ABNORMAL 10*3/UL
MONOCYTES # BLD MANUAL: 0.9 10E3/UL (ref 0–1.3)
MONOCYTES NFR BLD AUTO: ABNORMAL %
MONOCYTES NFR BLD MANUAL: 6 %
NEUTROPHILS # BLD AUTO: ABNORMAL 10*3/UL
NEUTROPHILS # BLD MANUAL: 7.9 10E3/UL (ref 1.6–8.3)
NEUTROPHILS NFR BLD AUTO: ABNORMAL %
NEUTROPHILS NFR BLD MANUAL: 54 %
NRBC # BLD AUTO: 0.1 10E3/UL
NRBC # BLD AUTO: 0.4 10E3/UL
NRBC BLD AUTO-RTO: 3 /100
NRBC BLD MANUAL-RTO: 1 %
PLAT MORPH BLD: ABNORMAL
PLATELET # BLD AUTO: 460 10E3/UL (ref 150–450)
POLYCHROMASIA BLD QL SMEAR: ABNORMAL
POTASSIUM SERPL-SCNC: 4 MMOL/L (ref 3.4–5.3)
PROT SERPL-MCNC: 7.7 G/DL (ref 6.4–8.3)
RBC # BLD AUTO: 2.52 10E6/UL (ref 3.8–5.2)
RBC MORPH BLD: ABNORMAL
RETICS # AUTO: 0.49 10E6/UL (ref 0.03–0.1)
RETICS/RBC NFR AUTO: 19.5 % (ref 0.5–2)
SICKLE CELLS BLD QL SMEAR: ABNORMAL
SODIUM SERPL-SCNC: 137 MMOL/L (ref 135–145)
WBC # BLD AUTO: 14.7 10E3/UL (ref 4–11)

## 2024-02-17 PROCEDURE — 258N000003 HC RX IP 258 OP 636: Performed by: EMERGENCY MEDICINE

## 2024-02-17 PROCEDURE — 96375 TX/PRO/DX INJ NEW DRUG ADDON: CPT | Performed by: EMERGENCY MEDICINE

## 2024-02-17 PROCEDURE — 85027 COMPLETE CBC AUTOMATED: CPT | Performed by: EMERGENCY MEDICINE

## 2024-02-17 PROCEDURE — 80053 COMPREHEN METABOLIC PANEL: CPT | Performed by: EMERGENCY MEDICINE

## 2024-02-17 PROCEDURE — 85045 AUTOMATED RETICULOCYTE COUNT: CPT | Performed by: EMERGENCY MEDICINE

## 2024-02-17 PROCEDURE — 36415 COLL VENOUS BLD VENIPUNCTURE: CPT | Performed by: EMERGENCY MEDICINE

## 2024-02-17 PROCEDURE — 85007 BL SMEAR W/DIFF WBC COUNT: CPT | Performed by: EMERGENCY MEDICINE

## 2024-02-17 PROCEDURE — 96376 TX/PRO/DX INJ SAME DRUG ADON: CPT | Performed by: EMERGENCY MEDICINE

## 2024-02-17 PROCEDURE — 96374 THER/PROPH/DIAG INJ IV PUSH: CPT | Performed by: EMERGENCY MEDICINE

## 2024-02-17 PROCEDURE — 96361 HYDRATE IV INFUSION ADD-ON: CPT | Performed by: EMERGENCY MEDICINE

## 2024-02-17 PROCEDURE — 250N000011 HC RX IP 250 OP 636: Performed by: EMERGENCY MEDICINE

## 2024-02-17 RX ORDER — HEPARIN SODIUM,PORCINE 10 UNIT/ML
5-10 VIAL (ML) INTRAVENOUS
Status: DISCONTINUED | OUTPATIENT
Start: 2024-02-17 | End: 2024-02-17 | Stop reason: HOSPADM

## 2024-02-17 RX ORDER — ONDANSETRON 2 MG/ML
4 INJECTION INTRAMUSCULAR; INTRAVENOUS EVERY 30 MIN PRN
Status: DISCONTINUED | OUTPATIENT
Start: 2024-02-17 | End: 2024-02-17 | Stop reason: HOSPADM

## 2024-02-17 RX ORDER — KETOROLAC TROMETHAMINE 15 MG/ML
10 INJECTION, SOLUTION INTRAMUSCULAR; INTRAVENOUS ONCE
Status: COMPLETED | OUTPATIENT
Start: 2024-02-17 | End: 2024-02-17

## 2024-02-17 RX ORDER — HEPARIN SODIUM (PORCINE) LOCK FLUSH IV SOLN 100 UNIT/ML 100 UNIT/ML
5-10 SOLUTION INTRAVENOUS
Status: DISCONTINUED | OUTPATIENT
Start: 2024-02-17 | End: 2024-02-17 | Stop reason: HOSPADM

## 2024-02-17 RX ORDER — HEPARIN SODIUM,PORCINE 10 UNIT/ML
5-10 VIAL (ML) INTRAVENOUS EVERY 24 HOURS
Status: DISCONTINUED | OUTPATIENT
Start: 2024-02-17 | End: 2024-02-17 | Stop reason: HOSPADM

## 2024-02-17 RX ADMIN — HYDROMORPHONE HYDROCHLORIDE 1 MG: 1 INJECTION, SOLUTION INTRAMUSCULAR; INTRAVENOUS; SUBCUTANEOUS at 02:51

## 2024-02-17 RX ADMIN — HYDROMORPHONE HYDROCHLORIDE 1 MG: 1 INJECTION, SOLUTION INTRAMUSCULAR; INTRAVENOUS; SUBCUTANEOUS at 04:09

## 2024-02-17 RX ADMIN — HYDROMORPHONE HYDROCHLORIDE 1 MG: 1 INJECTION, SOLUTION INTRAMUSCULAR; INTRAVENOUS; SUBCUTANEOUS at 02:17

## 2024-02-17 RX ADMIN — SODIUM CHLORIDE, PRESERVATIVE FREE 5 ML: 5 INJECTION INTRAVENOUS at 04:17

## 2024-02-17 RX ADMIN — SODIUM CHLORIDE 1000 ML: 9 INJECTION, SOLUTION INTRAVENOUS at 01:21

## 2024-02-17 RX ADMIN — KETOROLAC TROMETHAMINE 10 MG: 15 INJECTION, SOLUTION INTRAMUSCULAR; INTRAVENOUS at 01:22

## 2024-02-17 ASSESSMENT — ACTIVITIES OF DAILY LIVING (ADL)
ADLS_ACUITY_SCORE: 35
ADLS_ACUITY_SCORE: 37

## 2024-02-17 NOTE — ED TRIAGE NOTES
Pain in B calves started last night; pt tried all interventions she could at home with no relief.     Triage Assessment (Adult)       Row Name 02/16/24 9476          Triage Assessment    Airway WDL WDL        Respiratory WDL    Respiratory WDL WDL        Skin Circulation/Temperature WDL    Skin Circulation/Temperature WDL WDL        Cardiac WDL    Cardiac WDL WDL        Peripheral/Neurovascular WDL    Peripheral Neurovascular WDL WDL        Cognitive/Neuro/Behavioral WDL    Cognitive/Neuro/Behavioral WDL WDL

## 2024-02-17 NOTE — TELEPHONE ENCOUNTER
"Pt reports sickle cell crisis and pain not being managed despite \"heating pad, pain medication, muscle relaxer, hot shower, resting and hydration\". Pt reports pain is currently severe and an \"8\". Pt wants to be seen in ER states \"I know my body and what I need\". See full assessment below.    Writer advised pt to have another adult drive her to ER now per protocol. Call back if needing further assistance.     Pt verbalizes understanding and agrees to plan.       Reason for Disposition   [1] SEVERE sickle cell pain (e.g., pain episode, sickle cell attack) AND [2] has pain management plan AND [3] NOT better after taking pain medicine per plan    Additional Information   Negative: SEVERE difficulty breathing (e.g., struggling for each breath, retractions, cyanosis, speaks in single words, pulse > 120)   Negative: Shock suspected (e.g., cold/pale/clammy skin, too weak to stand, low BP, rapid pulse)   Negative: [1] Chest pain lasts > 5 minutes AND [2] history of heart disease (i.e., heart attack, bypass surgery, angina, angioplasty, CHF; not just a heart murmur)   Negative: [1] Chest pain that lasts > 5 minutes AND [2] described as crushing, pressure-like, or heavy   Negative: Sounds like a life-threatening emergency to the triager   Negative: Pain that follows an injury   Negative: Pain is not typical of patient's previous SCD acute pain attacks   Negative: [1] SEVERE abdominal pain AND [2] present > 1 hour   Negative: MODERATE difficulty breathing (e.g., speaks in phrases, SOB even at rest, pulse 100-120)   Negative: Chest pain > 5 minutes   Negative: [1] Chest pain AND [2] fever > 100.4 F (38.0 C)   Negative: Painful erection lasting longer than 4 hours    Answer Assessment - Initial Assessment Questions  1. ONSET: \"When did the pain begin?\"       Pt reports pain reoccurred \"a little bit after my infusion, towards the nighttime, constant\" (infusion yesterday)  2. LOCATION: \"Where does it hurt?\"       \"Legs\" bilateral " "\"lower part below knee\" entire calves per pt  3. SEVERITY: \"How bad is the pain?\"  (e.g., Scale 1-10; mild, moderate, or severe)    - MILD (1-3): doesn't interfere with normal activities     - MODERATE (4-7): interferes with normal activities or awakens from sleep     - SEVERE (8-10): excruciating pain, unable to do any normal activities       \"8\"  4. PATTERN: \"Is the pain constant?\" (e.g., yes, no; constant, intermittent)       Constant  5. CAUSE:  What do you think is causing the pain? Is this pain similar to the acute pain attacks you have had in the past? (e.g., similar location, quality, intensity)      Sickle cell   6. PAIN MEDICINES:     - \"Do you have a plan from your doctor for treating your acute pain episodes?\" (e.g., pain management plan)    - \"What have you taken so far for the pain?\" (e.g., nothing, acetaminophen, NSAIDS, narcotic pain medicine).     - \"How much has this helped relieve the pain?\"     - \"Have you done anything else to help the pain?\" (e.g., heating pad)      \"Heating pad, pain medicine, muscle relaxer, hot shower, resting, staying hydrated\"  7. OTHER SYMPTOMS: \"Do you have any other symptoms?\" (e.g., fever, chest pain, shortness of breath, new areas of swelling or redness)      Pt denies   8. PREGNANCY: \"Is there any chance you are pregnant?\" \"When was your last menstrual period?\"      Pt denies LMP \"don't have one\"    Protocols used: Sickle Cell Disease - Acute Pain Episode (Crisis)-A-AH    "

## 2024-02-17 NOTE — ED PROVIDER NOTES
[facility] EMERGENCY DEPARTMENT ([facility])    2/16/24      ED PROVIDER NOTE   History     Chief Complaint   Patient presents with    Sickle Cell Pain Crisis     Pain in B calves started last night; pt tried all interventions she could at home with no relief.     The history is provided by the patient and medical records. No  was used.     Jennifer Cervantes is a 23 year old female with a past medical history of sickle cell disease who presents to the ED for evaluation of increased sickle cell pain. Patient reports she went to her infusion on Thursday and felt fine after, but later on started experiencing increased pain in her legs. Patient reports taking oxycodone, ibuprofen, and muscle relaxer's at home with little to no relief.     Past Medical History  Past Medical History:   Diagnosis Date    Anxiety     Bleeding disorder (H24)     Blood clotting disorder (H24)     Cerebral infarction (H) 2015    Cognitive developmental delay     low IQ. 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 side (H)     right hand contractures    Iron overload due to repeated red blood cell transfusions     Migraines     Multiple subsegmental pulmonary emboli without acute cor pulmonale (H) 02/01/2021    Oppositional defiant behavior     Presence of intrauterine contraceptive device 2/18/2020    Superficial venous thrombosis of arm, right 03/25/2021    Uncomplicated asthma      Past Surgical History:   Procedure Laterality Date    AS INSERT TUNNELED CV 2 CATH W/O PORT/PUMP      CHOLECYSTECTOMY      CV RIGHT HEART CATH MEASUREMENTS RECORDED N/A 11/18/2021    Procedure: Right Heart Cath;  Surgeon: Jackson Stauffer MD;  Location:  HEART CARDIAC CATH LAB    INSERT PORT VASCULAR ACCESS Left 4/21/2021    Procedure: INSERTION, VASCULAR ACCESS PORT (NOT SURE ON SIDE UNTIL REMOVAL);  Surgeon:  Rajan More MD;  Location: [facility] OR    IR CHEST PORT PLACEMENT > 5 YRS OF AGE  4/21/2021    IR CVC NON TUNNEL LINE REMOVAL  5/6/2021    IR CVC NON TUNNEL PLACEMENT > 5 YRS  04/07/2020    IR CVC NON TUNNEL PLACEMENT > 5 YRS  4/30/2021    IR CVC NON TUNNEL PLACEMENT > 5 YRS  9/7/2022    IR PORT REMOVAL LEFT  4/21/2021    REMOVE PORT VASCULAR ACCESS Left 4/21/2021    Procedure: REMOVAL, VASCULAR ACCESS PORT LEFT;  Surgeon: Rajan More MD;  Location: [facility] OR    REPAIR TENDON ELBOW Right 10/02/2019    Procedure: Right Elbow Flexor Lengthening, Flexor Pronator Slide Of Wrist and Finger, Thumb Adductor Lengthening;  Surgeon: Anai Franco MD;  Location: [facility] OR    TONSILLECTOMY Bilateral 10/02/2019    Procedure: Bilateral Tonsillectomy;  Surgeon: Farhana Guy MD;  Location: [facility] OR    ZZC BREAST REDUCTION (INCLUDES LIPO) TIER 3 Bilateral 04/23/2019     acetaminophen (TYLENOL) 325 MG tablet  albuterol (PROAIR HFA/PROVENTIL HFA/VENTOLIN HFA) 108 (90 Base) MCG/ACT inhaler  albuterol (PROVENTIL) (2.5 MG/3ML) 0.083% neb solution  aspirin (ASA) 81 MG chewable tablet  budesonide-formoterol (SYMBICORT) 160-4.5 MCG/ACT Inhaler  budesonide-formoterol (SYMBICORT) 160-4.5 MCG/ACT Inhaler  cetirizine (ZYRTEC) 10 MG tablet  deferasirox (JADENU) 360 MG tablet  diphenhydrAMINE (BENADRYL) 25 MG capsule  EPINEPHrine (ANY BX GENERIC EQUIV) 0.3 MG/0.3ML injection 2-pack  Hydroxyurea 1000 MG TABS  methocarbamol (ROBAXIN) 750 MG tablet  naloxone (NARCAN) 4 MG/0.1ML nasal spray  omeprazole (PRILOSEC) 20 MG DR capsule  ondansetron (ZOFRAN) 8 MG tablet  oxyCODONE IR (ROXICODONE) 10 MG tablet      Allergies   Allergen Reactions    Contrast Dye      Hives and breathing issues    Fish-Derived Products Hives    Seafood Hives    Adhesive Tape Hives     Primipore dressing causes hives    Gadolinium     Iodinated Contrast Media      Family History  Family History   Problem Relation Age of Onset    Sickle Cell Trait Mother      Hypertension Mother     Asthma Mother     Sickle Cell Trait Father     Glaucoma No family hx of     Macular Degeneration No family hx of     Diabetes No family hx of     Gout No family hx of      Social History   Social History     Tobacco Use    Smoking status: Never     Passive exposure: Never    Smokeless tobacco: Never   Substance Use Topics    Alcohol use: Not Currently     Alcohol/week: 0.0 standard drinks of alcohol    Drug use: Never             Physical Exam   BP: 120/69  Pulse: 105  Temp: 98.7  F (37.1  C)  Resp: 16  SpO2: 92 %  Physical Exam  Constitutional:       General: She is not in acute distress.     Appearance: She is not diaphoretic.   HENT:      Head: Normocephalic and atraumatic.      Right Ear: External ear normal.      Left Ear: External ear normal.      Nose: Nose normal.   Eyes:      Extraocular Movements: Extraocular movements intact.      Conjunctiva/sclera: Conjunctivae normal.   Cardiovascular:      Rate and Rhythm: Normal rate and regular rhythm.      Heart sounds: Normal heart sounds.   Pulmonary:      Effort: Pulmonary effort is normal. No respiratory distress.      Breath sounds: Normal breath sounds.   Abdominal:      General: There is no distension.      Palpations: Abdomen is soft.      Tenderness: There is no abdominal tenderness.   Musculoskeletal:         General: No swelling or deformity.      Cervical back: Normal range of motion and neck supple.   Skin:     General: Skin is warm and dry.   Neurological:      Mental Status: Mental status is at baseline.      Comments: Alert, oriented   Psychiatric:         Mood and Affect: Mood normal.         Behavior: Behavior normal.           ED Course, Procedures, & Data      Procedures       Results for orders placed or performed during the hospital encounter of 02/16/24   Comprehensive metabolic panel     Status: Abnormal   Result Value Ref Range    Sodium 137 135 - 145 mmol/L    Potassium 4.0 3.4 - 5.3 mmol/L    Carbon Dioxide (CO2)  24 22 - 29 mmol/L    Anion Gap 11 7 - 15 mmol/L    Urea Nitrogen 18.7 6.0 - 20.0 mg/dL    Creatinine 0.65 0.51 - 0.95 mg/dL    GFR Estimate >90 >60 mL/min/1.73m2    Calcium 8.9 8.6 - 10.0 mg/dL    Chloride 102 98 - 107 mmol/L    Glucose 86 70 - 99 mg/dL    Alkaline Phosphatase 65 40 - 150 U/L    AST 54 (H) 0 - 45 U/L    ALT 25 0 - 50 U/L    Protein Total 7.7 6.4 - 8.3 g/dL    Albumin 4.6 3.5 - 5.2 g/dL    Bilirubin Total 2.9 (H) <=1.2 mg/dL   Reticulocyte count     Status: Abnormal   Result Value Ref Range    % Reticulocyte 19.5 (H) 0.5 - 2.0 %    Absolute Reticulocyte 0.488 (H) 0.025 - 0.095 10e6/uL   CBC with platelets and differential     Status: Abnormal   Result Value Ref Range    WBC Count 14.7 (H) 4.0 - 11.0 10e3/uL    RBC Count 2.52 (L) 3.80 - 5.20 10e6/uL    Hemoglobin 7.6 (L) 11.7 - 15.7 g/dL    Hematocrit 21.4 (L) 35.0 - 47.0 %    MCV 85 78 - 100 fL    MCH 30.2 26.5 - 33.0 pg    MCHC 35.5 31.5 - 36.5 g/dL    RDW 25.9 (H) 10.0 - 15.0 %    Platelet Count 460 (H) 150 - 450 10e3/uL    % Neutrophils      % Lymphocytes      % Monocytes      % Eosinophils      % Basophils      % Immature Granulocytes      NRBCs per 100 WBC 3 (H) <1 /100    Absolute Neutrophils      Absolute Lymphocytes      Absolute Monocytes      Absolute Eosinophils      Absolute Basophils      Absolute Immature Granulocytes      Absolute NRBCs 0.4 10e3/uL   Manual Differential     Status: Abnormal   Result Value Ref Range    % Neutrophils 54 %    % Lymphocytes 34 %    % Monocytes 6 %    % Eosinophils 3 %    % Basophils 3 %    NRBCs per 100 WBC 1 (H) <=0 %    Absolute Neutrophils 7.9 1.6 - 8.3 10e3/uL    Absolute Lymphocytes 5.0 0.8 - 5.3 10e3/uL    Absolute Monocytes 0.9 0.0 - 1.3 10e3/uL    Absolute Eosinophils 0.4 0.0 - 0.7 10e3/uL    Absolute Basophils 0.4 (H) 0.0 - 0.2 10e3/uL    Absolute NRBCs 0.1 (H) <=0.0 10e3/uL    RBC Morphology Confirmed RBC Indices     Platelet Assessment  Automated Count Confirmed. Platelet morphology is normal.      Automated Count Confirmed. Platelet morphology is normal.    Polychromasia Moderate (A) None Seen    Sickle Cells Marked (A) None Seen   CBC with platelets differential     Status: Abnormal    Narrative    The following orders were created for panel order CBC with platelets differential.  Procedure                               Abnormality         Status                     ---------                               -----------         ------                     CBC with platelets and d...[[id number]]  Abnormal            Final result               Manual Differential[[id number]]          Abnormal            Final result                 Please view results for these tests on the individual orders.                   Results for orders placed or performed during the hospital encounter of 02/16/24   Comprehensive metabolic panel     Status: Abnormal   Result Value Ref Range    Sodium 137 135 - 145 mmol/L    Potassium 4.0 3.4 - 5.3 mmol/L    Carbon Dioxide (CO2) 24 22 - 29 mmol/L    Anion Gap 11 7 - 15 mmol/L    Urea Nitrogen 18.7 6.0 - 20.0 mg/dL    Creatinine 0.65 0.51 - 0.95 mg/dL    GFR Estimate >90 >60 mL/min/1.73m2    Calcium 8.9 8.6 - 10.0 mg/dL    Chloride 102 98 - 107 mmol/L    Glucose 86 70 - 99 mg/dL    Alkaline Phosphatase 65 40 - 150 U/L    AST 54 (H) 0 - 45 U/L    ALT 25 0 - 50 U/L    Protein Total 7.7 6.4 - 8.3 g/dL    Albumin 4.6 3.5 - 5.2 g/dL    Bilirubin Total 2.9 (H) <=1.2 mg/dL   Reticulocyte count     Status: Abnormal   Result Value Ref Range    % Reticulocyte 19.5 (H) 0.5 - 2.0 %    Absolute Reticulocyte 0.488 (H) 0.025 - 0.095 10e6/uL   CBC with platelets and differential     Status: Abnormal   Result Value Ref Range    WBC Count 14.7 (H) 4.0 - 11.0 10e3/uL    RBC Count 2.52 (L) 3.80 - 5.20 10e6/uL    Hemoglobin 7.6 (L) 11.7 - 15.7 g/dL    Hematocrit 21.4 (L) 35.0 - 47.0 %    MCV 85 78 - 100 fL    MCH 30.2 26.5 - 33.0 pg    MCHC 35.5 31.5 - 36.5 g/dL    RDW 25.9 (H) 10.0 - 15.0 %    Platelet Count  460 (H) 150 - 450 10e3/uL    % Neutrophils      % Lymphocytes      % Monocytes      % Eosinophils      % Basophils      % Immature Granulocytes      NRBCs per 100 WBC 3 (H) <1 /100    Absolute Neutrophils      Absolute Lymphocytes      Absolute Monocytes      Absolute Eosinophils      Absolute Basophils      Absolute Immature Granulocytes      Absolute NRBCs 0.4 10e3/uL   Manual Differential     Status: Abnormal   Result Value Ref Range    % Neutrophils 54 %    % Lymphocytes 34 %    % Monocytes 6 %    % Eosinophils 3 %    % Basophils 3 %    NRBCs per 100 WBC 1 (H) <=0 %    Absolute Neutrophils 7.9 1.6 - 8.3 10e3/uL    Absolute Lymphocytes 5.0 0.8 - 5.3 10e3/uL    Absolute Monocytes 0.9 0.0 - 1.3 10e3/uL    Absolute Eosinophils 0.4 0.0 - 0.7 10e3/uL    Absolute Basophils 0.4 (H) 0.0 - 0.2 10e3/uL    Absolute NRBCs 0.1 (H) <=0.0 10e3/uL    RBC Morphology Confirmed RBC Indices     Platelet Assessment  Automated Count Confirmed. Platelet morphology is normal.     Automated Count Confirmed. Platelet morphology is normal.    Polychromasia Moderate (A) None Seen    Sickle Cells Marked (A) None Seen   CBC with platelets differential     Status: Abnormal    Narrative    The following orders were created for panel order CBC with platelets differential.  Procedure                               Abnormality         Status                     ---------                               -----------         ------                     CBC with platelets and d...[[id number]]  Abnormal            Final result               Manual Differential[[id number]]          Abnormal            Final result                 Please view results for these tests on the individual orders.     Medications   ondansetron (ZOFRAN) injection 4 mg (has no administration in time range)   heparin lock flush 10 UNIT/ML injection 5-10 mL (has no administration in time range)   heparin lock flush 10 UNIT/ML injection 5-10 mL ( Intracatheter Not Given 2/17/24 0297)    heparin 100 unit/mL injection 5-10 mL (5 mLs Intracatheter $Given 2/17/24 0417)   sodium chloride 0.9% BOLUS 1,000 mL (0 mLs Intravenous Stopped 2/17/24 0419)   ketorolac (TORADOL) injection 10 mg (10 mg Intravenous $Given 2/17/24 0122)   HYDROmorphone (DILAUDID) injection 1 mg (1 mg Intravenous $Given 2/17/24 4399)     Labs Ordered and Resulted from Time of ED Arrival to Time of ED Departure   COMPREHENSIVE METABOLIC PANEL - Abnormal       Result Value    Sodium 137      Potassium 4.0      Carbon Dioxide (CO2) 24      Anion Gap 11      Urea Nitrogen 18.7      Creatinine 0.65      GFR Estimate >90      Calcium 8.9      Chloride 102      Glucose 86      Alkaline Phosphatase 65      AST 54 (*)     ALT 25      Protein Total 7.7      Albumin 4.6      Bilirubin Total 2.9 (*)    RETICULOCYTE COUNT - Abnormal    % Reticulocyte 19.5 (*)     Absolute Reticulocyte 0.488 (*)    CBC WITH PLATELETS AND DIFFERENTIAL - Abnormal    WBC Count 14.7 (*)     RBC Count 2.52 (*)     Hemoglobin 7.6 (*)     Hematocrit 21.4 (*)     MCV 85      MCH 30.2      MCHC 35.5      RDW 25.9 (*)     Platelet Count 460 (*)     % Neutrophils        % Lymphocytes        % Monocytes        % Eosinophils        % Basophils        % Immature Granulocytes        NRBCs per 100 WBC 3 (*)     Absolute Neutrophils        Absolute Lymphocytes        Absolute Monocytes        Absolute Eosinophils        Absolute Basophils        Absolute Immature Granulocytes        Absolute NRBCs 0.4     DIFFERENTIAL - Abnormal    % Neutrophils 54      % Lymphocytes 34      % Monocytes 6      % Eosinophils 3      % Basophils 3      NRBCs per 100 WBC 1 (*)     Absolute Neutrophils 7.9      Absolute Lymphocytes 5.0      Absolute Monocytes 0.9      Absolute Eosinophils 0.4      Absolute Basophils 0.4 (*)     Absolute NRBCs 0.1 (*)     RBC Morphology Confirmed RBC Indices      Platelet Assessment        Value: Automated Count Confirmed. Platelet morphology is normal.     Polychromasia Moderate (*)     Sickle Cells Marked (*)      No orders to display          Medical Decision Making  The patient's presentation is strongly suggestive of high complexity (a chronic illness severe exacerbation, progression, or side effect of treatment).    The patient's evaluation involved:  review of external note(s) from 3+ sources (Most recent H&P in addition to clinic and ED note)  review of 2 test result(s) ordered prior to this encounter (Most recent BMP and CBC)    The patient's management involved high risk (a parenteral controlled substance).    Assessment & Plan    23-year-old female presents to us with a chief complaint complaint of sickle cell pain crisis.  This is similar to her previous episodes of sickle cell pain.  No fevers.  She was managed according to her normal protocol.  Hemoglobin is stable.  At this point she is feeling better and will follow-up with hematologist    I have reviewed the nursing notes. I have reviewed the findings, diagnosis, plan and need for follow up with the patient.    Discharge Medication List as of 2/17/2024  4:19 AM          Final diagnoses:   Sickle cell pain crisis (H)       [facility] EMERGENCY DEPARTMENT  2/16/2024     Mykel Luz,   02/17/24 0432

## 2024-02-19 ENCOUNTER — PATIENT OUTREACH (OUTPATIENT)
Dept: CARE COORDINATION | Facility: CLINIC | Age: 24
End: 2024-02-19
Payer: COMMERCIAL

## 2024-02-19 ENCOUNTER — PATIENT OUTREACH (OUTPATIENT)
Dept: ONCOLOGY | Facility: CLINIC | Age: 24
End: 2024-02-19
Payer: COMMERCIAL

## 2024-02-19 ENCOUNTER — NURSE TRIAGE (OUTPATIENT)
Dept: ONCOLOGY | Facility: CLINIC | Age: 24
End: 2024-02-19
Payer: COMMERCIAL

## 2024-02-19 ENCOUNTER — INFUSION THERAPY VISIT (OUTPATIENT)
Dept: INFUSION THERAPY | Facility: CLINIC | Age: 24
End: 2024-02-19
Attending: PEDIATRICS
Payer: COMMERCIAL

## 2024-02-19 VITALS
DIASTOLIC BLOOD PRESSURE: 67 MMHG | OXYGEN SATURATION: 99 % | HEART RATE: 92 BPM | RESPIRATION RATE: 16 BRPM | SYSTOLIC BLOOD PRESSURE: 120 MMHG | TEMPERATURE: 97.7 F

## 2024-02-19 DIAGNOSIS — G81.10 SPASTIC HEMIPLEGIA, UNSPECIFIED ETIOLOGY, UNSPECIFIED LATERALITY (H): ICD-10-CM

## 2024-02-19 DIAGNOSIS — D57.00 SICKLE CELL PAIN CRISIS (H): Primary | ICD-10-CM

## 2024-02-19 PROCEDURE — 96375 TX/PRO/DX INJ NEW DRUG ADDON: CPT

## 2024-02-19 PROCEDURE — 96361 HYDRATE IV INFUSION ADD-ON: CPT

## 2024-02-19 PROCEDURE — 250N000013 HC RX MED GY IP 250 OP 250 PS 637: Performed by: PEDIATRICS

## 2024-02-19 PROCEDURE — 250N000011 HC RX IP 250 OP 636: Performed by: PEDIATRICS

## 2024-02-19 PROCEDURE — 96376 TX/PRO/DX INJ SAME DRUG ADON: CPT

## 2024-02-19 PROCEDURE — 258N000003 HC RX IP 258 OP 636: Performed by: PEDIATRICS

## 2024-02-19 PROCEDURE — 96374 THER/PROPH/DIAG INJ IV PUSH: CPT

## 2024-02-19 RX ORDER — DIPHENHYDRAMINE HCL 25 MG
50 CAPSULE ORAL
Status: CANCELLED
Start: 2024-04-01

## 2024-02-19 RX ORDER — ONDANSETRON 2 MG/ML
8 INJECTION INTRAMUSCULAR; INTRAVENOUS EVERY 6 HOURS PRN
Status: CANCELLED
Start: 2024-04-01

## 2024-02-19 RX ORDER — ONDANSETRON 4 MG/1
8 TABLET, FILM COATED ORAL
Status: CANCELLED
Start: 2024-04-01

## 2024-02-19 RX ORDER — KETOROLAC TROMETHAMINE 30 MG/ML
30 INJECTION, SOLUTION INTRAMUSCULAR; INTRAVENOUS ONCE
Status: CANCELLED
Start: 2024-04-01 | End: 2024-04-01

## 2024-02-19 RX ORDER — KETOROLAC TROMETHAMINE 30 MG/ML
30 INJECTION, SOLUTION INTRAMUSCULAR; INTRAVENOUS ONCE
Status: COMPLETED | OUTPATIENT
Start: 2024-02-19 | End: 2024-02-19

## 2024-02-19 RX ORDER — DIPHENHYDRAMINE HCL 25 MG
50 CAPSULE ORAL
Status: COMPLETED | OUTPATIENT
Start: 2024-02-19 | End: 2024-02-19

## 2024-02-19 RX ORDER — HEPARIN SODIUM,PORCINE 10 UNIT/ML
5 VIAL (ML) INTRAVENOUS
Status: DISCONTINUED | OUTPATIENT
Start: 2024-02-19 | End: 2024-02-19 | Stop reason: HOSPADM

## 2024-02-19 RX ORDER — HEPARIN SODIUM (PORCINE) LOCK FLUSH IV SOLN 100 UNIT/ML 100 UNIT/ML
5 SOLUTION INTRAVENOUS
Status: CANCELLED | OUTPATIENT
Start: 2024-04-01

## 2024-02-19 RX ORDER — ONDANSETRON 2 MG/ML
8 INJECTION INTRAMUSCULAR; INTRAVENOUS EVERY 6 HOURS PRN
Status: DISCONTINUED | OUTPATIENT
Start: 2024-02-19 | End: 2024-02-19 | Stop reason: HOSPADM

## 2024-02-19 RX ORDER — HEPARIN SODIUM,PORCINE 10 UNIT/ML
5 VIAL (ML) INTRAVENOUS
Status: CANCELLED | OUTPATIENT
Start: 2024-04-01

## 2024-02-19 RX ORDER — HEPARIN SODIUM (PORCINE) LOCK FLUSH IV SOLN 100 UNIT/ML 100 UNIT/ML
5 SOLUTION INTRAVENOUS
Status: DISCONTINUED | OUTPATIENT
Start: 2024-02-19 | End: 2024-02-19 | Stop reason: HOSPADM

## 2024-02-19 RX ADMIN — HYDROMORPHONE HYDROCHLORIDE 1 MG: 1 INJECTION, SOLUTION INTRAMUSCULAR; INTRAVENOUS; SUBCUTANEOUS at 15:59

## 2024-02-19 RX ADMIN — KETOROLAC TROMETHAMINE 30 MG: 30 INJECTION, SOLUTION INTRAMUSCULAR; INTRAVENOUS at 13:59

## 2024-02-19 RX ADMIN — Medication 5 ML: at 16:09

## 2024-02-19 RX ADMIN — HYDROMORPHONE HYDROCHLORIDE 1 MG: 1 INJECTION, SOLUTION INTRAMUSCULAR; INTRAVENOUS; SUBCUTANEOUS at 14:59

## 2024-02-19 RX ADMIN — SODIUM CHLORIDE, POTASSIUM CHLORIDE, SODIUM LACTATE AND CALCIUM CHLORIDE 1000 ML: 600; 310; 30; 20 INJECTION, SOLUTION INTRAVENOUS at 14:00

## 2024-02-19 RX ADMIN — DIPHENHYDRAMINE HYDROCHLORIDE 50 MG: 25 CAPSULE ORAL at 13:57

## 2024-02-19 RX ADMIN — HYDROMORPHONE HYDROCHLORIDE 1 MG: 1 INJECTION, SOLUTION INTRAMUSCULAR; INTRAVENOUS; SUBCUTANEOUS at 14:00

## 2024-02-19 RX ADMIN — ONDANSETRON 8 MG: 2 INJECTION INTRAMUSCULAR; INTRAVENOUS at 13:59

## 2024-02-19 NOTE — PROGRESS NOTES
Social Work - Transportation  [facility]    Data/Intervention:  Patient Name: Jennifer Cervantes Goes By: Jennifer    /Age: 2000 (23 year old)    Referral From: [facility]   Reason for Referral:  support requested for patient transportation needs for today's infusion appt.  Assessment:  called [employer] to arrange ride through patient's insurance. [employer] arranged  for patient from home with [employer]. Patient will need to call [phone] when ready for return ride home.  Plan: Patient is aware of the transportation plan.  available to assist with any other needs.    CARLOS Chavez,[facility]  Hematology/Oncology Social Worker  Phone:[phone] Pager: [phone]

## 2024-02-19 NOTE — PATIENT INSTRUCTIONS
Dear Jennifer Cervantes    Thank you for choosing [facility] ([facility] for your infusion.  The following information is a summary of our appointment as well as important reminders.      If you are a transplant patient and require transplant education, please click on this link: [email or web address].    We look forward in seeing you on your next appointment here at [facility] ([facility]).  Please don t hesitate to call us at [phone] to reschedule any of your appointments or to speak with one of the [facility] registered nurses.  It was a pleasure taking care of you today.    Sincerely,    [facility]  [facility]  [address] [place] [address]  [id number]  Phone:  [phone]

## 2024-02-19 NOTE — PROGRESS NOTES
Infusion Nursing Note:  Jennifer Cervantes presents today for No chief complaint on file.      Patient seen by provider today: No   present during visit today: No    Note: During today's [facility] appointment, orders from Eric Duncan MD  were completed.  Patient identification verified by name and date of birth.  Assessment completed.  Vitals recorded in Doc Flowsheets.  Patient was provided with education regarding medication/procedure and possible side effects.  Patient verbalized understanding.    Frequency: PRN  Labs: none  Premedications:N/A   Total infusion time of approximately 2 hours     Administrations This Visit       diphenhydrAMINE (BENADRYL) capsule 50 mg       Admin Date  02/19/2024 Action  $Given Dose  50 mg Route  Oral Documented By  Elena Kelly RN              HYDROmorphone (DILAUDID) injection 1 mg       Admin Date  02/19/2024 Action  $Given Dose  1 mg Route  Intravenous Documented By  Elena Kelly RN               Admin Date  02/19/2024 Action  $Given Dose  1 mg Route  Intravenous Documented By  Elena Kelly RN              ketorolac (TORADOL) injection 30 mg       Admin Date  02/19/2024 Action  $Given Dose  30 mg Route  Intravenous Documented By  Elena Kelly RN              lactated ringers BOLUS 1,000 mL       Admin Date  02/19/2024 Action  $New Bag Dose  1,000 mL Rate  500 mL/hr Route  Intravenous Documented By  Elena Kelly RN              ondansetron (ZOFRAN) injection 8 mg       Admin Date  02/19/2024 Action  $Given Dose  8 mg Route  Intravenous Documented By  Elena Kelly RN                    Intravenous Access:  Labs drawn without difficulty.  Port accessed    Treatment Conditions:  Not Applicable.      Post Infusion Assessment:  Patient tolerated infusion without incident.  Site patent and intact, free from redness, edema or discomfort.  No evidence of extravasations.  Access discontinued per protocol.        Discharge Plan:   Discharge instructions reviewed with: Patient.  AVS to patient via [employer].  Patient will return   Future Appointments   Date Time Provider Department Center   2/20/2024  9:30 AM Judy Anders AuD [facility]   2/23/2024 10:00 AM  [facility] DRAW [facility]   2/23/2024 10:30 AM Patricia Mantilla CNP [facility]   2/23/2024 11:30 AM  ONC INFUSION NURSE [facility]   3/18/2024  1:00 PM  [facility] DRAW [facility]   3/18/2024  1:30 PM Eric Duncan MD [facility]   3/22/2024 11:00 AM  [facility] LAB DRAW [facility]   3/22/2024 11:30 AM  ONC INFUSION NURSE [facility]   3/28/2024  8:00 AM Katherine Pierce MD [facility]   4/19/2024 11:00 AM  [facility] DRAW [facility]   4/19/2024 11:30 AM  ONC INFUSION NURSE [facility]   5/17/2024 11:00 AM  [facility] LAB DRAW [facility]   5/17/2024 11:30 AM  ONC INFUSION NURSE [facility]   5/31/2024  1:00 PM [email or web address]   5/31/2024  1:30 PM UC PFL D [facility]   5/31/2024  2:00 PM UC PFL A [facility]   5/31/2024  2:20 PM Jake Harvey MD [facility]   6/14/2024 10:00 AM  [facility] LAB DRAW [facility]   6/14/2024 10:30 AM Patricia Mantilla CNP [facility]   6/14/2024 11:30 AM  ONC INFUSION NURSE [facility]   7/12/2024 11:00 AM  [facility] LAB DRAW [facility]   7/12/2024 11:30 AM  ONC INFUSION NURSE [facility]      for next appointment.   Patient discharged in stable condition accompanied by: self.  Departure Mode: Ambulatory    /53 (BP Location: Left arm, Patient Position: Sitting, Cuff Size: Adult Regular)   Pulse 88   Temp 97.7  F (36.5  C) (Oral)   Resp 16   SpO2 99%   Elena Kelly RN on 2/19/2024 at 3:06 PM  .

## 2024-02-19 NOTE — TELEPHONE ENCOUNTER
Oncology Nurse Triage - Sickle Cell Pain Crisis:    Situation: Jennifer  calling about Sickle Cell Pain Crisis, requesting to be added on for IV fluids and pain medicine    Background:     Patient's last infusion was 2/16/2024 couldn't get into outpt so went to ED  Last clinic visit date:2/5/2024 Patricia Mantilla CNP  Does patient have active treatment plan?  Yes      Assessment of Symptoms:  Onset/Duration of symptoms: 2 day    Is it typical sickle cell pain? Yes   Location: arms and lower back  Character: Sharp           Intensity: 9/10    Any radiation of pain, numbness, tingling, weakness, warmth, swelling, discoloration of arms or legs?No     Fever?No    Chest Pain Present: No     Shortness of breath: No     Other home therapies tried: HEAT/HEATING PAD and WARM BATH     Last home medication taken and when: oxycodone around 6am    Any Refills Needed?: No     Does patient have transportation & length of time to get to clinic: No         Recommendations:   Meets protocol for IVF/Pain    [facility] , pt in agreement with plan. Needs transportation.     0814 Paged  Abdullahi    [id number] Scheduling request sent.     Please note, if you are late for your appt, you risk losing your infusion appt as it may delay another patient's infusion who arrived on time.

## 2024-02-21 ENCOUNTER — NURSE TRIAGE (OUTPATIENT)
Dept: ONCOLOGY | Facility: CLINIC | Age: 24
End: 2024-02-21
Payer: COMMERCIAL

## 2024-02-21 ENCOUNTER — HOSPITAL ENCOUNTER (EMERGENCY)
Facility: CLINIC | Age: 24
Discharge: HOME | End: 2024-02-21
Attending: EMERGENCY MEDICINE | Admitting: EMERGENCY MEDICINE
Payer: COMMERCIAL

## 2024-02-21 VITALS
RESPIRATION RATE: 16 BRPM | SYSTOLIC BLOOD PRESSURE: 118 MMHG | OXYGEN SATURATION: 93 % | DIASTOLIC BLOOD PRESSURE: 63 MMHG | TEMPERATURE: 98.3 F | HEART RATE: 75 BPM

## 2024-02-21 DIAGNOSIS — D57.00 SICKLE CELL PAIN CRISIS (H): ICD-10-CM

## 2024-02-21 LAB
ALBUMIN SERPL BCG-MCNC: 4.6 G/DL (ref 3.5–5.2)
ALP SERPL-CCNC: 61 U/L (ref 40–150)
ALT SERPL W P-5'-P-CCNC: 29 U/L (ref 0–50)
ANION GAP SERPL CALCULATED.3IONS-SCNC: 12 MMOL/L (ref 7–15)
AST SERPL W P-5'-P-CCNC: 74 U/L (ref 0–45)
BASOPHILS # BLD AUTO: 0.3 10E3/UL (ref 0–0.2)
BASOPHILS NFR BLD AUTO: 2 %
BILIRUB SERPL-MCNC: 4.4 MG/DL
BUN SERPL-MCNC: 7.8 MG/DL (ref 6–20)
CALCIUM SERPL-MCNC: 9 MG/DL (ref 8.6–10)
CHLORIDE SERPL-SCNC: 105 MMOL/L (ref 98–107)
CREAT SERPL-MCNC: 0.53 MG/DL (ref 0.51–0.95)
DEPRECATED HCO3 PLAS-SCNC: 23 MMOL/L (ref 22–29)
EGFRCR SERPLBLD CKD-EPI 2021: >90 ML/MIN/1.73M2
EOSINOPHIL # BLD AUTO: 0.3 10E3/UL (ref 0–0.7)
EOSINOPHIL NFR BLD AUTO: 2 %
ERYTHROCYTE [DISTWIDTH] IN BLOOD BY AUTOMATED COUNT: 23.8 % (ref 10–15)
GLUCOSE SERPL-MCNC: 88 MG/DL (ref 70–99)
HCT VFR BLD AUTO: 20.3 % (ref 35–47)
HGB BLD-MCNC: 7.3 G/DL (ref 11.7–15.7)
IMM GRANULOCYTES # BLD: 0.1 10E3/UL
IMM GRANULOCYTES NFR BLD: 1 %
LIPASE SERPL-CCNC: 24 U/L (ref 13–60)
LYMPHOCYTES # BLD AUTO: 1.5 10E3/UL (ref 0.8–5.3)
LYMPHOCYTES NFR BLD AUTO: 8 %
MCH RBC QN AUTO: 31.1 PG (ref 26.5–33)
MCHC RBC AUTO-ENTMCNC: 36 G/DL (ref 31.5–36.5)
MCV RBC AUTO: 86 FL (ref 78–100)
MONOCYTES # BLD AUTO: 1.3 10E3/UL (ref 0–1.3)
MONOCYTES NFR BLD AUTO: 7 %
NEUTROPHILS # BLD AUTO: 15.7 10E3/UL (ref 1.6–8.3)
NEUTROPHILS NFR BLD AUTO: 80 %
NRBC # BLD AUTO: 0.2 10E3/UL
NRBC BLD AUTO-RTO: 1 /100
PLATELET # BLD AUTO: 418 10E3/UL (ref 150–450)
POTASSIUM SERPL-SCNC: 3.9 MMOL/L (ref 3.4–5.3)
PROT SERPL-MCNC: 7.6 G/DL (ref 6.4–8.3)
RBC # BLD AUTO: 2.35 10E6/UL (ref 3.8–5.2)
SODIUM SERPL-SCNC: 140 MMOL/L (ref 135–145)
WBC # BLD AUTO: 19.3 10E3/UL (ref 4–11)

## 2024-02-21 PROCEDURE — 250N000011 HC RX IP 250 OP 636: Performed by: EMERGENCY MEDICINE

## 2024-02-21 PROCEDURE — 83690 ASSAY OF LIPASE: CPT | Performed by: EMERGENCY MEDICINE

## 2024-02-21 PROCEDURE — 36591 DRAW BLOOD OFF VENOUS DEVICE: CPT | Performed by: EMERGENCY MEDICINE

## 2024-02-21 PROCEDURE — 96376 TX/PRO/DX INJ SAME DRUG ADON: CPT | Performed by: EMERGENCY MEDICINE

## 2024-02-21 PROCEDURE — 99284 EMERGENCY DEPT VISIT MOD MDM: CPT | Mod: 25 | Performed by: EMERGENCY MEDICINE

## 2024-02-21 PROCEDURE — 96375 TX/PRO/DX INJ NEW DRUG ADDON: CPT | Performed by: EMERGENCY MEDICINE

## 2024-02-21 PROCEDURE — 85004 AUTOMATED DIFF WBC COUNT: CPT | Performed by: EMERGENCY MEDICINE

## 2024-02-21 PROCEDURE — 96374 THER/PROPH/DIAG INJ IV PUSH: CPT | Performed by: EMERGENCY MEDICINE

## 2024-02-21 PROCEDURE — 80053 COMPREHEN METABOLIC PANEL: CPT | Performed by: EMERGENCY MEDICINE

## 2024-02-21 PROCEDURE — 96361 HYDRATE IV INFUSION ADD-ON: CPT | Performed by: EMERGENCY MEDICINE

## 2024-02-21 PROCEDURE — 258N000003 HC RX IP 258 OP 636: Performed by: EMERGENCY MEDICINE

## 2024-02-21 PROCEDURE — 99285 EMERGENCY DEPT VISIT HI MDM: CPT | Performed by: EMERGENCY MEDICINE

## 2024-02-21 RX ORDER — ONDANSETRON 2 MG/ML
4 INJECTION INTRAMUSCULAR; INTRAVENOUS EVERY 30 MIN PRN
Status: DISCONTINUED | OUTPATIENT
Start: 2024-02-21 | End: 2024-02-21 | Stop reason: HOSPADM

## 2024-02-21 RX ORDER — HEPARIN SODIUM (PORCINE) LOCK FLUSH IV SOLN 100 UNIT/ML 100 UNIT/ML
5-10 SOLUTION INTRAVENOUS
Status: DISCONTINUED | OUTPATIENT
Start: 2024-02-21 | End: 2024-02-21 | Stop reason: HOSPADM

## 2024-02-21 RX ORDER — HEPARIN SODIUM,PORCINE 10 UNIT/ML
5-10 VIAL (ML) INTRAVENOUS
Status: DISCONTINUED | OUTPATIENT
Start: 2024-02-21 | End: 2024-02-21 | Stop reason: HOSPADM

## 2024-02-21 RX ORDER — HEPARIN SODIUM,PORCINE 10 UNIT/ML
5-10 VIAL (ML) INTRAVENOUS EVERY 24 HOURS
Status: DISCONTINUED | OUTPATIENT
Start: 2024-02-21 | End: 2024-02-21 | Stop reason: HOSPADM

## 2024-02-21 RX ORDER — KETOROLAC TROMETHAMINE 15 MG/ML
15 INJECTION, SOLUTION INTRAMUSCULAR; INTRAVENOUS ONCE
Status: COMPLETED | OUTPATIENT
Start: 2024-02-21 | End: 2024-02-21

## 2024-02-21 RX ADMIN — HYDROMORPHONE HYDROCHLORIDE 1 MG: 1 INJECTION, SOLUTION INTRAMUSCULAR; INTRAVENOUS; SUBCUTANEOUS at 15:58

## 2024-02-21 RX ADMIN — HYDROMORPHONE HYDROCHLORIDE 1 MG: 1 INJECTION, SOLUTION INTRAMUSCULAR; INTRAVENOUS; SUBCUTANEOUS at 14:34

## 2024-02-21 RX ADMIN — ONDANSETRON 4 MG: 2 INJECTION INTRAMUSCULAR; INTRAVENOUS at 13:19

## 2024-02-21 RX ADMIN — HYDROMORPHONE HYDROCHLORIDE 1 MG: 1 INJECTION, SOLUTION INTRAMUSCULAR; INTRAVENOUS; SUBCUTANEOUS at 13:19

## 2024-02-21 RX ADMIN — SODIUM CHLORIDE 1000 ML: 9 INJECTION, SOLUTION INTRAVENOUS at 13:23

## 2024-02-21 RX ADMIN — HEPARIN, PORCINE (PF) 10 UNIT/ML INTRAVENOUS SYRINGE 5 ML: at 16:23

## 2024-02-21 NOTE — ED PROVIDER NOTES
[facility] EMERGENCY DEPARTMENT ([facility])    2/21/24      ED PROVIDER NOTE    History     Chief Complaint   Patient presents with    Abdominal Pain     Onset 2 hours ago with left sided  abdominal pain radiating to entire body.     The history is provided by the patient and medical records.     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 Patient reports bad abdominal pain on the left side, describing as tight and squeezing. She states that she cannot manage the pain with her at home medication. She states that this is different from her usual sickle cell pain. She denies fever, coughing, vomiting, dysuria. She denies pain on the right side. She denies known history of pancreatitis.      Of note, patient has ED care plan in place as showed below:    Message to the ER: Please understand that due to a previous stroke, she has issues with urinary incontinence. Please respect this and help her in timely fashion when asked. Delays in help on this in the ED on a recurrent basis have been very defeating and demeaning.   Acute Pain Crisis Treatment: (limiting IV dosing)  ER   Dilaudid 1 mg IV q1h up to 3 doses  Toradol 30 mg IV x1   Maintenance IV fluids with LR  Other: Zofran 8 mg IV PRN nausea  Inpatient:  PCA Dilaudid 1 hr q30 minutes, no basal rate  Toradol 30 mg x6h x 4 hr  LR maintenance x 1-2 days  Other Medications: Zofran  ASA  Supportive Care: Docusate, Senna    Past Medical History  Past Medical History:   Diagnosis Date    Anxiety     Bleeding disorder (H24)     Blood clotting disorder (H24)     Cerebral infarction (H) 2015    Cognitive developmental delay     low IQ. Please recognize when managing pain and planning with her    Depressive disorder     Hemiplegia and hemiparesis following  cerebral infarction affecting right dominant side (H)     right hand contractures    Iron overload due to repeated red blood cell transfusions     Migraines     Multiple subsegmental pulmonary emboli without acute cor pulmonale (H) 02/01/2021    Oppositional defiant behavior     Presence of intrauterine contraceptive device 2/18/2020    Superficial venous thrombosis of arm, right 03/25/2021    Uncomplicated asthma      Past Surgical History:   Procedure Laterality Date    AS INSERT TUNNELED CV 2 CATH W/O PORT/PUMP      CHOLECYSTECTOMY      CV RIGHT HEART CATH MEASUREMENTS RECORDED N/A 11/18/2021    Procedure: Right Heart Cath;  Surgeon: Jackson Stauffer MD;  Location:  HEART CARDIAC CATH LAB    INSERT PORT VASCULAR ACCESS Left 4/21/2021    Procedure: INSERTION, VASCULAR ACCESS PORT (NOT SURE ON SIDE UNTIL REMOVAL);  Surgeon: Rajan More MD;  Location: [facility] OR    IR CHEST PORT PLACEMENT > 5 YRS OF AGE  4/21/2021    IR CVC NON TUNNEL LINE REMOVAL  5/6/2021    IR CVC NON TUNNEL PLACEMENT > 5 YRS  04/07/2020    IR CVC NON TUNNEL PLACEMENT > 5 YRS  4/30/2021    IR CVC NON TUNNEL PLACEMENT > 5 YRS  9/7/2022    IR PORT REMOVAL LEFT  4/21/2021    REMOVE PORT VASCULAR ACCESS Left 4/21/2021    Procedure: REMOVAL, VASCULAR ACCESS PORT LEFT;  Surgeon: Rajan More MD;  Location: [facility] OR    REPAIR TENDON ELBOW Right 10/02/2019    Procedure: Right Elbow Flexor Lengthening, Flexor Pronator Slide Of Wrist and Finger, Thumb Adductor Lengthening;  Surgeon: Anai Franco MD;  Location: [facility] OR    TONSILLECTOMY Bilateral 10/02/2019    Procedure: Bilateral Tonsillectomy;  Surgeon: Farhana Guy MD;  Location: [facility] OR    ZZC BREAST REDUCTION (INCLUDES LIPO) TIER 3 Bilateral 04/23/2019     acetaminophen (TYLENOL) 325 MG tablet  albuterol (PROAIR HFA/PROVENTIL HFA/VENTOLIN HFA) 108 (90 Base) MCG/ACT inhaler  albuterol (PROVENTIL) (2.5 MG/3ML) 0.083% neb solution  aspirin (ASA) 81 MG chewable  tablet  budesonide-formoterol (SYMBICORT) 160-4.5 MCG/ACT Inhaler  budesonide-formoterol (SYMBICORT) 160-4.5 MCG/ACT Inhaler  cetirizine (ZYRTEC) 10 MG tablet  deferasirox (JADENU) 360 MG tablet  diphenhydrAMINE (BENADRYL) 25 MG capsule  EPINEPHrine (ANY BX GENERIC EQUIV) 0.3 MG/0.3ML injection 2-pack  Hydroxyurea 1000 MG TABS  methocarbamol (ROBAXIN) 750 MG tablet  naloxone (NARCAN) 4 MG/0.1ML nasal spray  omeprazole (PRILOSEC) 20 MG DR capsule  ondansetron (ZOFRAN) 8 MG tablet  oxyCODONE IR (ROXICODONE) 10 MG tablet      Allergies   Allergen Reactions    Contrast Dye      Hives and breathing issues    Fish-Derived Products Hives    Seafood Hives    Adhesive Tape Hives     Primipore dressing causes hives    Gadolinium     Iodinated Contrast Media      Family History  Family History   Problem Relation Age of Onset    Sickle Cell Trait Mother     Hypertension Mother     Asthma Mother     Sickle Cell Trait Father     Glaucoma No family hx of     Macular Degeneration No family hx of     Diabetes No family hx of     Gout No family hx of      Social History   Social History     Tobacco Use    Smoking status: Never     Passive exposure: Never    Smokeless tobacco: Never   Substance Use Topics    Alcohol use: Not Currently     Alcohol/week: 0.0 standard drinks of alcohol    Drug use: Never         A complete review of systems was performed with pertinent positives and negatives noted in the HPI, and all other systems negative.    Physical Exam   BP: 121/66  Pulse: 108  Temp: 98.3  F (36.8  C)  Resp: 16  SpO2: 94 %  Physical Exam  Constitutional:       General: She is not in acute distress.     Appearance: She is ill-appearing. She is not diaphoretic.   HENT:      Head: Normocephalic and atraumatic.      Right Ear: External ear normal.      Left Ear: External ear normal.      Nose: Nose normal.   Eyes:      Extraocular Movements: Extraocular movements intact.      Conjunctiva/sclera: Conjunctivae normal.   Cardiovascular:       Rate and Rhythm: Normal rate and regular rhythm.      Heart sounds: Normal heart sounds.   Pulmonary:      Effort: Pulmonary effort is normal. No respiratory distress.      Breath sounds: Normal breath sounds.   Abdominal:      General: There is no distension.      Palpations: Abdomen is soft.      Tenderness: There is abdominal tenderness in the left upper quadrant.   Musculoskeletal:         General: No swelling or deformity.      Cervical back: Normal range of motion and neck supple.   Skin:     General: Skin is warm and dry.   Neurological:      Mental Status: Mental status is at baseline.      Comments: Alert, oriented   Psychiatric:         Mood and Affect: Mood normal.         Behavior: Behavior normal.           ED Course, Procedures, & Data      Procedures          Results for orders placed or performed during the hospital encounter of 02/21/24   Lipase     Status: Normal   Result Value Ref Range    Lipase 24 13 - 60 U/L   Comprehensive metabolic panel     Status: Abnormal   Result Value Ref Range    Sodium 140 135 - 145 mmol/L    Potassium 3.9 3.4 - 5.3 mmol/L    Carbon Dioxide (CO2) 23 22 - 29 mmol/L    Anion Gap 12 7 - 15 mmol/L    Urea Nitrogen 7.8 6.0 - 20.0 mg/dL    Creatinine 0.53 0.51 - 0.95 mg/dL    GFR Estimate >90 >60 mL/min/1.73m2    Calcium 9.0 8.6 - 10.0 mg/dL    Chloride 105 98 - 107 mmol/L    Glucose 88 70 - 99 mg/dL    Alkaline Phosphatase 61 40 - 150 U/L    AST 74 (H) 0 - 45 U/L    ALT 29 0 - 50 U/L    Protein Total 7.6 6.4 - 8.3 g/dL    Albumin 4.6 3.5 - 5.2 g/dL    Bilirubin Total 4.4 (H) <=1.2 mg/dL   CBC with platelets and differential     Status: Abnormal   Result Value Ref Range    WBC Count 19.3 (H) 4.0 - 11.0 10e3/uL    RBC Count 2.35 (L) 3.80 - 5.20 10e6/uL    Hemoglobin 7.3 (L) 11.7 - 15.7 g/dL    Hematocrit 20.3 (L) 35.0 - 47.0 %    MCV 86 78 - 100 fL    MCH 31.1 26.5 - 33.0 pg    MCHC 36.0 31.5 - 36.5 g/dL    RDW 23.8 (H) 10.0 - 15.0 %    Platelet Count 418 150 - 450  10e3/uL    % Neutrophils 80 %    % Lymphocytes 8 %    % Monocytes 7 %    % Eosinophils 2 %    % Basophils 2 %    % Immature Granulocytes 1 %    NRBCs per 100 WBC 1 (H) <1 /100    Absolute Neutrophils 15.7 (H) 1.6 - 8.3 10e3/uL    Absolute Lymphocytes 1.5 0.8 - 5.3 10e3/uL    Absolute Monocytes 1.3 0.0 - 1.3 10e3/uL    Absolute Eosinophils 0.3 0.0 - 0.7 10e3/uL    Absolute Basophils 0.3 (H) 0.0 - 0.2 10e3/uL    Absolute Immature Granulocytes 0.1 <=0.4 10e3/uL    Absolute NRBCs 0.2 10e3/uL   CBC with platelets differential     Status: Abnormal    Narrative    The following orders were created for panel order CBC with platelets differential.  Procedure                               Abnormality         Status                     ---------                               -----------         ------                     CBC with platelets and d...[[id number]]  Abnormal            Final result                 Please view results for these tests on the individual orders.              Results for orders placed or performed during the hospital encounter of 02/21/24   Lipase     Status: Normal   Result Value Ref Range    Lipase 24 13 - 60 U/L   Comprehensive metabolic panel     Status: Abnormal   Result Value Ref Range    Sodium 140 135 - 145 mmol/L    Potassium 3.9 3.4 - 5.3 mmol/L    Carbon Dioxide (CO2) 23 22 - 29 mmol/L    Anion Gap 12 7 - 15 mmol/L    Urea Nitrogen 7.8 6.0 - 20.0 mg/dL    Creatinine 0.53 0.51 - 0.95 mg/dL    GFR Estimate >90 >60 mL/min/1.73m2    Calcium 9.0 8.6 - 10.0 mg/dL    Chloride 105 98 - 107 mmol/L    Glucose 88 70 - 99 mg/dL    Alkaline Phosphatase 61 40 - 150 U/L    AST 74 (H) 0 - 45 U/L    ALT 29 0 - 50 U/L    Protein Total 7.6 6.4 - 8.3 g/dL    Albumin 4.6 3.5 - 5.2 g/dL    Bilirubin Total 4.4 (H) <=1.2 mg/dL   CBC with platelets and differential     Status: Abnormal   Result Value Ref Range    WBC Count 19.3 (H) 4.0 - 11.0 10e3/uL    RBC Count 2.35 (L) 3.80 - 5.20 10e6/uL    Hemoglobin 7.3 (L) 11.7  - 15.7 g/dL    Hematocrit 20.3 (L) 35.0 - 47.0 %    MCV 86 78 - 100 fL    MCH 31.1 26.5 - 33.0 pg    MCHC 36.0 31.5 - 36.5 g/dL    RDW 23.8 (H) 10.0 - 15.0 %    Platelet Count 418 150 - 450 10e3/uL    % Neutrophils 80 %    % Lymphocytes 8 %    % Monocytes 7 %    % Eosinophils 2 %    % Basophils 2 %    % Immature Granulocytes 1 %    NRBCs per 100 WBC 1 (H) <1 /100    Absolute Neutrophils 15.7 (H) 1.6 - 8.3 10e3/uL    Absolute Lymphocytes 1.5 0.8 - 5.3 10e3/uL    Absolute Monocytes 1.3 0.0 - 1.3 10e3/uL    Absolute Eosinophils 0.3 0.0 - 0.7 10e3/uL    Absolute Basophils 0.3 (H) 0.0 - 0.2 10e3/uL    Absolute Immature Granulocytes 0.1 <=0.4 10e3/uL    Absolute NRBCs 0.2 10e3/uL   CBC with platelets differential     Status: Abnormal    Narrative    The following orders were created for panel order CBC with platelets differential.  Procedure                               Abnormality         Status                     ---------                               -----------         ------                     CBC with platelets and d...[[id number]]  Abnormal            Final result                 Please view results for these tests on the individual orders.     Medications   ondansetron (ZOFRAN) injection 4 mg (4 mg Intravenous $Given 2/21/24 1319)   sodium chloride (PF) 0.9% PF flush 10-20 mL (has no administration in time range)   sodium chloride (PF) 0.9% PF flush 10-20 mL (has no administration in time range)   sodium chloride (PF) 0.9% PF flush 10-20 mL (20 mLs Intracatheter $Given 2/21/24 1325)   heparin lock flush 10 UNIT/ML injection 5-10 mL (has no administration in time range)   heparin lock flush 10 UNIT/ML injection 5-10 mL ( Intracatheter Not Given 2/21/24 1325)   heparin 100 unit/mL injection 5-10 mL ( Intracatheter Not Given 2/21/24 1325)   ketorolac (TORADOL) injection 15 mg (15 mg Intravenous Not Given 2/21/24 1325)   sodium chloride 0.9% BOLUS 1,000 mL (0 mLs Intravenous Stopped 2/21/24 1430)   HYDROmorphone  (DILAUDID) injection 1 mg (1 mg Intravenous $Given 2/21/24 5705)     Labs Ordered and Resulted from Time of ED Arrival to Time of ED Departure   COMPREHENSIVE METABOLIC PANEL - Abnormal       Result Value    Sodium 140      Potassium 3.9      Carbon Dioxide (CO2) 23      Anion Gap 12      Urea Nitrogen 7.8      Creatinine 0.53      GFR Estimate >90      Calcium 9.0      Chloride 105      Glucose 88      Alkaline Phosphatase 61      AST 74 (*)     ALT 29      Protein Total 7.6      Albumin 4.6      Bilirubin Total 4.4 (*)    CBC WITH PLATELETS AND DIFFERENTIAL - Abnormal    WBC Count 19.3 (*)     RBC Count 2.35 (*)     Hemoglobin 7.3 (*)     Hematocrit 20.3 (*)     MCV 86      MCH 31.1      MCHC 36.0      RDW 23.8 (*)     Platelet Count 418      % Neutrophils 80      % Lymphocytes 8      % Monocytes 7      % Eosinophils 2      % Basophils 2      % Immature Granulocytes 1      NRBCs per 100 WBC 1 (*)     Absolute Neutrophils 15.7 (*)     Absolute Lymphocytes 1.5      Absolute Monocytes 1.3      Absolute Eosinophils 0.3      Absolute Basophils 0.3 (*)     Absolute Immature Granulocytes 0.1      Absolute NRBCs 0.2     LIPASE - Normal    Lipase 24     ROUTINE UA WITH MICROSCOPIC REFLEX TO CULTURE   HCG QUALITATIVE URINE     No orders to display          Medical Decision Making  The patient's presentation is strongly suggestive of high complexity (a chronic illness severe exacerbation, progression, or side effect of treatment).    The patient's evaluation involved:  review of external note(s) from 3+ sources (Most recent H&P in addition to clinic and ED note)  review of 2 test result(s) ordered prior to this encounter (Most recent BMP and CBC)  Review of 2+ test ordered today CBC CMP    The patient's management involved moderate risk (prescription drug management including medications given in the ED).      Assessment & Plan    23-year-old female presents to us with a chief complaint of sickle cell pain.  The pain is in  her left abdomen today.  Vital signs are unremarkable with exception of mild tachycardia.  Patient was given fluids IV Dilaudid and Zofran per her pain protocol.  This did resolve his symptoms.  Labs are reassuring.  Hemoglobin stable.  She will follow-up with her usual care team and return to us as needed    I have reviewed the nursing notes. I have reviewed the findings, diagnosis, plan and need for follow up with the patient.    New Prescriptions    No medications on file       Final diagnoses:   Sickle cell pain crisis (H)       Mykel Luz DO  [facility] EMERGENCY DEPARTMENT  2/21/2024           Mykel Luz DO  02/21/24 160

## 2024-02-21 NOTE — TELEPHONE ENCOUNTER
"Jennifer is calling to update Dr. Duncan, Patricia Mantilla, JOCELYN, and Arely Krueger, RNCC.    She is on her way to the ED from work at the time of the call.  She did not have a work accident.  She reports a pain in her L stomach area and nausea.  The pain feels like someone is squeezing, pinching, and twisting her intestines; the pain is worsening, so she is going to the ED to have it checked.  She has an appointment on Friday in clinic that she wants to keep. (Labs, apt with Patricia, and SERA infusion)  She wants this writer to let the team know that she is on her way to the ED and why she is on her way to the ED.  She states, \"I don't want them to think I am drug seeking because I really am in a lot of pain.\"    She asked this writer to let the team know about this.  This writer told Jennifer the message would be sent to the Care Team now.    Routed to Care Team.  "

## 2024-02-22 DIAGNOSIS — D57.00 SICKLE CELL PAIN CRISIS (H): ICD-10-CM

## 2024-02-22 RX ORDER — ALBUTEROL SULFATE 90 UG/1
1-2 AEROSOL, METERED RESPIRATORY (INHALATION)
Status: CANCELLED
Start: 2024-02-23

## 2024-02-22 RX ORDER — METHYLPREDNISOLONE SODIUM SUCCINATE 125 MG/2ML
125 INJECTION, POWDER, LYOPHILIZED, FOR SOLUTION INTRAMUSCULAR; INTRAVENOUS
Status: CANCELLED
Start: 2024-02-23

## 2024-02-22 RX ORDER — MEPERIDINE HYDROCHLORIDE 25 MG/ML
25 INJECTION INTRAMUSCULAR; INTRAVENOUS; SUBCUTANEOUS EVERY 30 MIN PRN
Status: CANCELLED | OUTPATIENT
Start: 2024-02-23

## 2024-02-22 RX ORDER — HEPARIN SODIUM (PORCINE) LOCK FLUSH IV SOLN 100 UNIT/ML 100 UNIT/ML
5 SOLUTION INTRAVENOUS
Status: CANCELLED | OUTPATIENT
Start: 2024-02-23

## 2024-02-22 RX ORDER — DIPHENHYDRAMINE HYDROCHLORIDE 50 MG/ML
50 INJECTION INTRAMUSCULAR; INTRAVENOUS
Status: CANCELLED
Start: 2024-02-23

## 2024-02-22 RX ORDER — HEPARIN SODIUM,PORCINE 10 UNIT/ML
5 VIAL (ML) INTRAVENOUS
Status: CANCELLED | OUTPATIENT
Start: 2024-02-23

## 2024-02-22 RX ORDER — OXYCODONE HYDROCHLORIDE 10 MG/1
10 TABLET ORAL EVERY 4 HOURS PRN
Qty: 20 TABLET | Refills: 0 | Status: SHIPPED | OUTPATIENT
Start: 2024-02-23 | End: 2024-02-29

## 2024-02-22 RX ORDER — ALBUTEROL SULFATE 0.83 MG/ML
2.5 SOLUTION RESPIRATORY (INHALATION)
Status: CANCELLED | OUTPATIENT
Start: 2024-02-23

## 2024-02-22 RX ORDER — EPINEPHRINE 1 MG/ML
0.3 INJECTION, SOLUTION INTRAMUSCULAR; SUBCUTANEOUS EVERY 5 MIN PRN
Status: CANCELLED | OUTPATIENT
Start: 2024-02-23

## 2024-02-22 NOTE — PROGRESS NOTES
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 She had multiple thromboembolic events in 2021 despite adherent anticoagulation use (though warfarin was perpetually low) and there are concerns for chronic thromboembolic disease but did not have pulmonary HTN on a November 2021 cath. She is maintained on chronic PO opioids and twice-weekly infusion visits (since 1/24/22) but has been able to be maintained on this regimen and has stayed out of the ED most of the time with even rarer admissions for most of 2023.     Interval History:  Jennifer was seen in the ED two days ago on 2/21. She was working after having many weeks off due to persistent sickle cell pain. Her first shift back was an 8 hour shift. 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 She also developed nausea with the pain and vomited once at work. Her mom had to pick her up and bring her to the ER. While there, her pain responded to her routine ER opioid plan and she was able to discharge.     She continues to experience intermittent nausea and vomiting. This has not improved since starting omeprazole but she continues to take this daily. She finds relief in rubbing her stomach in circular motions.    She's had difficulty sleeping over the past few months. It is not uncommon for her to fall asleep for 2-3 hours and then stay awake for the rest of the night. She uses Benadryl occasionally for this but does  "not find this very helpful. Her home life has been okay. For the most part she is getting along with her mother and siblings. She is still dating her boyfriend and finds he is very supportive although struggles opening up to people at times based on previous relationships with family and other boyfriends     Sickle Cell Disease Comprehensive Checklist  Bone Health/Avascular Necrosis Screening/Symptoms (each visit): no new concerns today  Leg Ulcer evaluation (every visit): no  Hypertension (every visit):stable 11/3/23  Last pulmonary evaluation (asthma, AMAN, pulm HTN): 9/28/22, due for follow-up  Stroke/silent cerebral infarct Hx (Y/N): Yes TIA ~2014, first event ~age 1 with full stroke and R sided weakness  Last PCP Visit: 3/6/23  Vaccines:  PCV13: 5/13/19  Pneumovax (PPSV23): 3/04, 10/09, 7/12/19 (next due 7/2024)  Menactra: 4/2010, 9/2015 (MCV done 8/16/21)  MenB: 9/16/15, 5/13/19  Influenza: 10/2/23  Audiology (chelation): done 6/2020, normal.. However, on 6/7, \"While there is no significant change in grade on the CTCAE 4.03 Scale, there were hearing changes bilaterally for both standard and extended high frequency audiometry.  Will need to determine if an ENT consult is advised due to the asymmetry in extended high frequency testing.\"     Plan last reviewed with patient: 10/2/23    Patient background: 22 yo F, enjoys movies and kids though there are times where she does not really want to talk to people. Does not have a lot of social support at home.     Sickle Cell Disease History  Primary Hematologist Team: Jose Rafael Duncan  PCP: none  Genotype: SS  Acute Pain Crisis Treatment: (limiting IV   ER   Dilaudid 1 mg IV q1h up to 3 doses  Toradol 30 mg IV x1   Maintenance IV fluids with LR  Other: Zofran 8 mg IV PRN nausea  Inpatient:  PCA Dilaudid 1 hr q30 minutes, no basal rate  Toradol 30 mg x6h x 4 hr  LR maintenance x 1-2 days  Other Medications: Zofran  ASA  Supportive Care: Docusate, Senna  Chronic Pain " Medications:    Home regimen: oxycodone 15 mg p.o. q.4-6 hours p.r.n. breakthrough pain.  She also continues on Voltaren gel, and Zoloft among other medications.    -Also benefits from mental health visits, acupuncture  Baseline Hemoglobin: 7 g/dl without chronic transfusions  Hydroxyurea use: Yes  H/O blood transfusions: Yes, several (iron overload) Most recent 11/20/2021  H/O Transfusion Reactions: no  Antibodies:none  H/O Acute Chest Syndrome: Yes  Last episode:9/05/22 (previously 4/26/21, 10/2019)   ICU/intubation: not with 9/2022 admission  H/O Stroke: Yes (managed with chronic transfusions in the past, stopped late Spring 2020)  H/O VTE: Yes (2/2021)  H/O Cholecystectomy or Splenectomy: no  H/O Asthma, Pulm HTN, AVN, Leg Ulcers, Nephropathy, Retinopathy, etc: Iron overload, asthma, chronic lung disease, physical limitations from early stroke    ---------------------------------------  Jennifer Cervantes's Goals (discussed today 2/23/24)    1-3 month goal:  Return to work, hopefully with shorter shifts. Would like to complete [facility] paperwork to keep her job safe.    6 month goal:      12 month goal:      Disease-specific goal(s):  Decrease frequency of ED and infusion center visits. Ultimately would like to decrease oxycodone use.  ---------------------------------------      Current Outpatient Medications   Medication Sig Dispense Refill    aspirin (ASA) 81 MG chewable tablet Take 1 tablet (81 mg) by mouth 2 times daily 60 tablet 11    deferasirox (JADENU) 360 MG tablet Take 4 tablets (1,440 mg) by mouth every evening 120 tablet 4    Hydroxyurea 1000 MG TABS Take 3,000 mg by mouth daily 90 tablet 3    melatonin 5 MG tablet Take 1 tablet (5 mg) by mouth nightly as needed for sleep 30 tablet 1    acetaminophen (TYLENOL) 325 MG tablet Take 2 tablets (650 mg) by mouth every 6 hours as needed for mild pain 120 tablet 3    albuterol (PROAIR HFA/PROVENTIL HFA/VENTOLIN HFA) 108 (90 Base) MCG/ACT inhaler Inhale 2 puffs into  the lungs every 6 hours as needed for shortness of breath or wheezing 8.5 g 3    albuterol (PROVENTIL) (2.5 MG/3ML) 0.083% neb solution Take 2 vials (5 mg) by nebulization every 6 hours as needed for shortness of breath or wheezing 90 mL 3    budesonide-formoterol (SYMBICORT) 160-4.5 MCG/ACT Inhaler Inhale 2 puffs twice daily plus 1-2 puffs as needed. May use up to 12 puffs per day. 20.4 g 11    budesonide-formoterol (SYMBICORT) 160-4.5 MCG/ACT Inhaler Inhale 2 puffs into the lungs 2 times daily 10.2 g 3    cetirizine (ZYRTEC) 10 MG tablet Take 1 tablet (10 mg) by mouth daily 30 tablet 1    diphenhydrAMINE (BENADRYL) 25 MG capsule Take 1 capsule (25 mg) by mouth every 6 hours as needed for itching or allergies 30 capsule 0    EPINEPHrine (ANY BX GENERIC EQUIV) 0.3 MG/0.3ML injection 2-pack Inject 0.3 mLs (0.3 mg) into the muscle as needed for anaphylaxis 1 each 1    methocarbamol (ROBAXIN) 750 MG tablet Take 1 tablet (750 mg) by mouth 4 times daily as needed for muscle spasms (during sickle pain crises. Okay to take scheduled while in pain) 60 tablet 1    naloxone (NARCAN) 4 MG/0.1ML nasal spray Spray 4 mg into one nostril alternating nostrils as needed for opioid reversal every 2-3 minutes until assistance arrives (Patient not taking: Reported on 2/5/2024)      omeprazole (PRILOSEC) 20 MG DR capsule Take 1 capsule (20 mg) by mouth daily 30 capsule 0    ondansetron (ZOFRAN) 8 MG tablet Take 1 tablet (8 mg) by mouth every 8 hours as needed for nausea 30 tablet 1    oxyCODONE IR (ROXICODONE) 10 MG tablet Take 1 tablet (10 mg) by mouth every 4 hours as needed for severe pain or breakthrough pain Goal 4 per day. Max 6 per day. 20 tablet 0       Past Medical History  Past Medical History:   Diagnosis Date    Anxiety     Bleeding disorder (H24)     Blood clotting disorder (H24)     Cerebral infarction (H) 2015    Cognitive developmental delay     low IQ. Please recognize when managing pain and planning with her     Depressive disorder     Hemiplegia and hemiparesis following cerebral infarction affecting right dominant side (H)     right hand contractures    Iron overload due to repeated red blood cell transfusions     Migraines     Multiple subsegmental pulmonary emboli without acute cor pulmonale (H) 02/01/2021    Oppositional defiant behavior     Presence of intrauterine contraceptive device 2/18/2020    Superficial venous thrombosis of arm, right 03/25/2021    Uncomplicated asthma      Past Surgical History:   Procedure Laterality Date    AS INSERT TUNNELED CV 2 CATH W/O PORT/PUMP      CHOLECYSTECTOMY      CV RIGHT HEART CATH MEASUREMENTS RECORDED N/A 11/18/2021    Procedure: Right Heart Cath;  Surgeon: Jackson Stauffer MD;  Location:  HEART CARDIAC CATH LAB    INSERT PORT VASCULAR ACCESS Left 4/21/2021    Procedure: INSERTION, VASCULAR ACCESS PORT (NOT SURE ON SIDE UNTIL REMOVAL);  Surgeon: Rajan More MD;  Location: [facility] OR    IR CHEST PORT PLACEMENT > 5 YRS OF AGE  4/21/2021    IR CVC NON TUNNEL LINE REMOVAL  5/6/2021    IR CVC NON TUNNEL PLACEMENT > 5 YRS  04/07/2020    IR CVC NON TUNNEL PLACEMENT > 5 YRS  4/30/2021    IR CVC NON TUNNEL PLACEMENT > 5 YRS  9/7/2022    IR PORT REMOVAL LEFT  4/21/2021    REMOVE PORT VASCULAR ACCESS Left 4/21/2021    Procedure: REMOVAL, VASCULAR ACCESS PORT LEFT;  Surgeon: Rajan More MD;  Location: [facility] OR    REPAIR TENDON ELBOW Right 10/02/2019    Procedure: Right Elbow Flexor Lengthening, Flexor Pronator Slide Of Wrist and Finger, Thumb Adductor Lengthening;  Surgeon: Anai Franco MD;  Location: [facility] OR    TONSILLECTOMY Bilateral 10/02/2019    Procedure: Bilateral Tonsillectomy;  Surgeon: Farhana Guy MD;  Location: [facility] OR    ZZC BREAST REDUCTION (INCLUDES LIPO) TIER 3 Bilateral 04/23/2019     Allergies   Allergen Reactions    Contrast Dye      Hives and breathing issues    Fish-Derived Products Hives    Seafood Hives    Adhesive Tape Hives     Primipore  dressing causes hives    Gadolinium     Iodinated Contrast Media      Social History   Social History     Tobacco Use    Smoking status: Never     Passive exposure: Never    Smokeless tobacco: Never   Substance Use Topics    Alcohol use: Not Currently     Alcohol/week: 0.0 standard drinks of alcohol    Drug use: Never    Still living at home with mom and extended family.   Past medical history and social history were reviewed.    Physical Examination:  /78 (BP Location: Right arm, Patient Position: Sitting, Cuff Size: Adult Regular)   Pulse 82   Temp 98.1  F (36.7  C) (Oral)   Resp 16   SpO2 96%       Wt Readings from Last 10 Encounters:   02/03/24 67.1 kg (148 lb)   01/26/24 66.2 kg (145 lb 14.4 oz)   01/12/24 66.2 kg (146 lb)   01/06/24 67.6 kg (149 lb)   01/04/24 67.9 kg (149 lb 12.8 oz)   12/28/23 68 kg (149 lb 14.4 oz)   12/22/23 68 kg (150 lb)   12/20/23 67 kg (147 lb 11.2 oz)   12/08/23 68 kg (150 lb)   12/01/23 66.9 kg (147 lb 6.4 oz)     General: Pleasant female, NAD  Eyes: EOMI, PERRL. Mild. scleral icterus.  Respiratory: Faint expiratory wheeze in LLL, otherwise CTA  CV: rrr, no m/g/r.  GI: Soft, NT. No hepatosplenomegaly noted.  Ext: No peripheral edema.  MSK: Contractured right arm and hand 2/2 stoke.  Neurologic: Grossly nonfocal. A/O x 4.  Skin: No rashes, petechiae, or bruising noted on exposed skin. Port accessed in left chest    Laboratory Data:  Most Recent 3 CBC's:  Recent Labs   Lab Test 02/23/24  1024 02/21/24  1315 02/17/24  0113 02/09/24  2112   WBC 12.8* 19.3* 14.7* 10.6   HGB 7.6* 7.3* 7.6* 8.6*   MCV 86 86 85 88   * 418 460* 390   ANEUTAUTO 8.2 15.7*  --  6.3    Most Recent 3 BMP's:  Recent Labs   Lab Test 02/23/24  1024 02/21/24  1315 02/17/24  0113 02/09/24  2112    140 137 138   POTASSIUM 3.6 3.9 4.0 4.1   CHLORIDE 106 105 102 102   CO2 22 23 24 24   BUN 8.5 7.8 18.7 9.2   CR 0.49* 0.53 0.65 0.53   ANIONGAP 11 12 11 12   MICAH 9.0 9.0 8.9 8.7   GLC 87 88 86 90    PROTTOTAL  --  7.6 7.7 7.1   ALBUMIN  --  4.6 4.6 4.2    Most Recent 2 LFT's:  Recent Labs   Lab Test 02/21/24  1315 02/17/24  0113   AST 74* 54*   ALT 29 25   ALKPHOS 61 65   BILITOTAL 4.4* 2.9*    Most Recent TSH and T4:No lab results found.  Phos/Mag:  Lab Results   Component Value Date    PHOS 4.8 (H) 05/13/2023    PHOS 5.0 (H) 05/12/2023    PHOS 3.6 02/21/2021    MAG 2.0 11/11/2021    MAG 1.7 02/21/2021    MAG 2.1 02/02/2021        I reviewed the above labs today.    Assessment and Plan:  1. Sickle Cell HgbSS Disease  2. Acute pain crises  3. Chronic Pain  4. 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 History of CVA  7. Hearing loss  8. Nausea/vomiting     Jennifer is seen for routine sickle cell follow-up and sidney today. She has been experiencing an increase in sickle cell pain over the past 4 months leading to increased ED visits. Her pain has been limiting her ability to work although she feels motivated to return to work and get back into an normal routine. She's interesting in completing [facility] paperwork. I encouraged her to reach out to her manager or HR department for the appropriate paperwork.     She continues to pursue infusion room visits for IV hydration and pain control. Her ability to get in for infusion is variable based on capacity. This is typically when she turns to ED utilization for pain control. We have neither increased or decreased her oxycodone prescriptions for many months now and did not discuss this in detail today. Based on recent discussions though, she is still motivated to reduce oxycodone use and has wanted to wait until her pain was better controlled. She is currently receiving weekly prescriptions, 10 mg with a total quantity of 20 tablets.    She was seen by pulmonology on 2/9/24 for follow-up of asthma. She was advised to add Symbicort as needed in addition to her twice daily routine use which she has started. She denies any  recent asthma flares but does have an occasional cough which she feels originates deep in her lungs.    I advised a trial of melatonin for sleep. She voiced some concern that she heard on TikTok this wasn't supposed to be used long-term. I think this is a much more acceptable pharmacological option compared to alternative medications which have the potential to cause more significant side effects or drug interactions.     Based on previous discussion Dr. Duncan we've decided to continue taking a break from IV desferal and continue Jadenu given improvement on her last Ferriscan from September 2023 as well as due to ototoxicity and ocular risks with chronic treatment. We do need to make sure she gets back into audiology and ophthalmology annually due to chelator. Last eye exam was recently completed on 10/25/23. Audiology visit scheduled 2/27/24.    Persistent nausea and vomiting have been a newer concern in the past few months. Lipase level was previously checked and is normal. She refused H. pylori testing but was agreeable to trial a PPI which has not resulted in improvement. We previously discussed pursuing an upper endoscopy which she is agreeable to.        Plan:  -Continue Hydrea to 3000mg daily to help lessen frequency of sickle cell pain.   -Continue monthly crizanlizumab infusions.  -Continue slow taper of oxycodone. Currently receiving oxycodone 10 mg every 4 hours PRN, qty 20. Recommend tapering to qty 15 when she is agreeable.  -She can self-reduce infusion days/week and we will continue to keep the cap at 2/week for now.  -Continue infusion center visits limited to two times per week (Mondays and Fridays ideally although still needs to call to request). Continue diligent home management with current medications, heat, rest, compression, warm baths. Methocarbamol was recently added which Jennifer is utilizing and finds helpful.  -If unable to manage at home can go to ED  with continued plan to use IV  Dilaudid and take a break from ketamine (10/2/23). No PCA use and goal for any admission would still be to discharge by 5 days or less  - Desferal infusions on hold. Continue Jadenu. Repeat Ferriscan in 4-6 months (January-March 2024). Requested scheduling today.  -Continue aspirin BID  -Upper GI endoscopy for evaluation of persistent n/v  -Trial melatonin 5 mg at bedtime for insomnia  -RTC with Dr. Duncan in 1 month     75 minutes spent on the date of the encounter doing chart review, review of test results, interpretation of tests, patient visit, and documentation     Patricia Mantilla, CNP

## 2024-02-22 NOTE — TELEPHONE ENCOUNTER
Narcotic Refill Request  Medication(s) requested:  Oxycodone 10mg tab- hoping to get signed to  for 2/23/24.   Person Requesting Refill: Jennifer   What pain is the medication treating: sickle cell pain  How is the medication being taken?:  1 tab q6h PRN  Does pt have enough for today? yes  Is pain being adequately controlled on the current regimen?: yes  Experiencing any side effects from medication?:     Date of most recent appointment:  2/5/24 w/ Patricia Mantilla   Any No Show Visits: no  Next appointment:   2/23/24  Last fill date and by whom:  Patricia Mantilla on 2/16/24   Reviewed:  no access    Send to provider: Patricia Mantilla

## 2024-02-23 ENCOUNTER — ONCOLOGY VISIT (OUTPATIENT)
Dept: ONCOLOGY | Facility: CLINIC | Age: 24
End: 2024-02-23
Attending: REGISTERED NURSE
Payer: COMMERCIAL

## 2024-02-23 ENCOUNTER — NURSE TRIAGE (OUTPATIENT)
Dept: ONCOLOGY | Facility: CLINIC | Age: 24
End: 2024-02-23
Payer: COMMERCIAL

## 2024-02-23 ENCOUNTER — APPOINTMENT (OUTPATIENT)
Dept: LAB | Facility: CLINIC | Age: 24
End: 2024-02-23
Attending: REGISTERED NURSE
Payer: COMMERCIAL

## 2024-02-23 VITALS
SYSTOLIC BLOOD PRESSURE: 118 MMHG | DIASTOLIC BLOOD PRESSURE: 78 MMHG | HEART RATE: 82 BPM | TEMPERATURE: 98.1 F | OXYGEN SATURATION: 96 % | RESPIRATION RATE: 16 BRPM

## 2024-02-23 DIAGNOSIS — D57.1 HB-SS DISEASE WITHOUT CRISIS (H): Primary | ICD-10-CM

## 2024-02-23 DIAGNOSIS — E83.111 IRON OVERLOAD DUE TO REPEATED RED BLOOD CELL TRANSFUSIONS: ICD-10-CM

## 2024-02-23 DIAGNOSIS — F51.01 PRIMARY INSOMNIA: ICD-10-CM

## 2024-02-23 DIAGNOSIS — G81.10 SPASTIC HEMIPLEGIA, UNSPECIFIED ETIOLOGY, UNSPECIFIED LATERALITY (H): ICD-10-CM

## 2024-02-23 DIAGNOSIS — J45.909 ASTHMA, UNSPECIFIED ASTHMA SEVERITY, UNSPECIFIED WHETHER COMPLICATED, UNSPECIFIED WHETHER PERSISTENT: ICD-10-CM

## 2024-02-23 DIAGNOSIS — I82.611 SUPERFICIAL VENOUS THROMBOSIS OF ARM, RIGHT: ICD-10-CM

## 2024-02-23 DIAGNOSIS — D57.00 SICKLE CELL PAIN CRISIS (H): Primary | ICD-10-CM

## 2024-02-23 LAB
ANION GAP SERPL CALCULATED.3IONS-SCNC: 11 MMOL/L (ref 7–15)
BASOPHILS # BLD AUTO: 0.3 10E3/UL (ref 0–0.2)
BASOPHILS NFR BLD AUTO: 3 %
BUN SERPL-MCNC: 8.5 MG/DL (ref 6–20)
CALCIUM SERPL-MCNC: 9 MG/DL (ref 8.6–10)
CHLORIDE SERPL-SCNC: 106 MMOL/L (ref 98–107)
CREAT SERPL-MCNC: 0.49 MG/DL (ref 0.51–0.95)
DEPRECATED HCO3 PLAS-SCNC: 22 MMOL/L (ref 22–29)
EGFRCR SERPLBLD CKD-EPI 2021: >90 ML/MIN/1.73M2
EOSINOPHIL # BLD AUTO: 0.5 10E3/UL (ref 0–0.7)
EOSINOPHIL NFR BLD AUTO: 4 %
ERYTHROCYTE [DISTWIDTH] IN BLOOD BY AUTOMATED COUNT: 24.4 % (ref 10–15)
GLUCOSE SERPL-MCNC: 87 MG/DL (ref 70–99)
HCT VFR BLD AUTO: 21.1 % (ref 35–47)
HGB BLD-MCNC: 7.6 G/DL (ref 11.7–15.7)
IMM GRANULOCYTES # BLD: 0.1 10E3/UL
IMM GRANULOCYTES NFR BLD: 1 %
LYMPHOCYTES # BLD AUTO: 2.6 10E3/UL (ref 0.8–5.3)
LYMPHOCYTES NFR BLD AUTO: 20 %
MCH RBC QN AUTO: 30.9 PG (ref 26.5–33)
MCHC RBC AUTO-ENTMCNC: 36 G/DL (ref 31.5–36.5)
MCV RBC AUTO: 86 FL (ref 78–100)
MONOCYTES # BLD AUTO: 1.1 10E3/UL (ref 0–1.3)
MONOCYTES NFR BLD AUTO: 9 %
NEUTROPHILS # BLD AUTO: 8.2 10E3/UL (ref 1.6–8.3)
NEUTROPHILS NFR BLD AUTO: 63 %
NRBC # BLD AUTO: 0.4 10E3/UL
NRBC BLD AUTO-RTO: 3 /100
PLATELET # BLD AUTO: 456 10E3/UL (ref 150–450)
POTASSIUM SERPL-SCNC: 3.6 MMOL/L (ref 3.4–5.3)
RBC # BLD AUTO: 2.46 10E6/UL (ref 3.8–5.2)
RETICS # AUTO: 0.57 10E6/UL (ref 0.03–0.1)
RETICS/RBC NFR AUTO: 24.2 % (ref 0.5–2)
SODIUM SERPL-SCNC: 139 MMOL/L (ref 135–145)
WBC # BLD AUTO: 12.8 10E3/UL (ref 4–11)

## 2024-02-23 PROCEDURE — 85025 COMPLETE CBC W/AUTO DIFF WBC: CPT | Performed by: PEDIATRICS

## 2024-02-23 PROCEDURE — 250N000011 HC RX IP 250 OP 636: Performed by: REGISTERED NURSE

## 2024-02-23 PROCEDURE — G0463 HOSPITAL OUTPT CLINIC VISIT: HCPCS | Performed by: REGISTERED NURSE

## 2024-02-23 PROCEDURE — 99215 OFFICE O/P EST HI 40 MIN: CPT | Performed by: REGISTERED NURSE

## 2024-02-23 PROCEDURE — 80048 BASIC METABOLIC PNL TOTAL CA: CPT | Performed by: PEDIATRICS

## 2024-02-23 PROCEDURE — 250N000011 HC RX IP 250 OP 636: Mod: JW | Performed by: PEDIATRICS

## 2024-02-23 PROCEDURE — 96413 CHEMO IV INFUSION 1 HR: CPT

## 2024-02-23 PROCEDURE — 250N000013 HC RX MED GY IP 250 OP 250 PS 637: Performed by: PEDIATRICS

## 2024-02-23 PROCEDURE — 96365 THER/PROPH/DIAG IV INF INIT: CPT

## 2024-02-23 PROCEDURE — 258N000003 HC RX IP 258 OP 636: Performed by: PEDIATRICS

## 2024-02-23 PROCEDURE — 96375 TX/PRO/DX INJ NEW DRUG ADDON: CPT

## 2024-02-23 PROCEDURE — 96361 HYDRATE IV INFUSION ADD-ON: CPT

## 2024-02-23 PROCEDURE — 85045 AUTOMATED RETICULOCYTE COUNT: CPT | Performed by: PEDIATRICS

## 2024-02-23 PROCEDURE — 36591 DRAW BLOOD OFF VENOUS DEVICE: CPT | Performed by: PEDIATRICS

## 2024-02-23 PROCEDURE — 96376 TX/PRO/DX INJ SAME DRUG ADON: CPT

## 2024-02-23 PROCEDURE — 99417 PROLNG OP E/M EACH 15 MIN: CPT | Performed by: REGISTERED NURSE

## 2024-02-23 RX ORDER — ONDANSETRON 2 MG/ML
8 INJECTION INTRAMUSCULAR; INTRAVENOUS EVERY 6 HOURS PRN
Status: CANCELLED
Start: 2024-04-01

## 2024-02-23 RX ORDER — DIPHENHYDRAMINE HCL 25 MG
50 CAPSULE ORAL
Status: COMPLETED | OUTPATIENT
Start: 2024-02-23 | End: 2024-02-23

## 2024-02-23 RX ORDER — DIPHENHYDRAMINE HCL 25 MG
50 CAPSULE ORAL
Status: CANCELLED
Start: 2024-04-01

## 2024-02-23 RX ORDER — HEPARIN SODIUM,PORCINE 10 UNIT/ML
5 VIAL (ML) INTRAVENOUS
Status: CANCELLED | OUTPATIENT
Start: 2024-04-01

## 2024-02-23 RX ORDER — KETOROLAC TROMETHAMINE 30 MG/ML
30 INJECTION, SOLUTION INTRAMUSCULAR; INTRAVENOUS ONCE
Status: COMPLETED | OUTPATIENT
Start: 2024-02-23 | End: 2024-02-23

## 2024-02-23 RX ORDER — ONDANSETRON 4 MG/1
8 TABLET, FILM COATED ORAL
Status: CANCELLED
Start: 2024-04-01

## 2024-02-23 RX ORDER — HEPARIN SODIUM (PORCINE) LOCK FLUSH IV SOLN 100 UNIT/ML 100 UNIT/ML
5 SOLUTION INTRAVENOUS
Status: DISCONTINUED | OUTPATIENT
Start: 2024-02-23 | End: 2024-02-23 | Stop reason: HOSPADM

## 2024-02-23 RX ORDER — KETOROLAC TROMETHAMINE 30 MG/ML
30 INJECTION, SOLUTION INTRAMUSCULAR; INTRAVENOUS ONCE
Status: CANCELLED
Start: 2024-04-01 | End: 2024-04-01

## 2024-02-23 RX ORDER — HEPARIN SODIUM (PORCINE) LOCK FLUSH IV SOLN 100 UNIT/ML 100 UNIT/ML
5 SOLUTION INTRAVENOUS
Status: CANCELLED | OUTPATIENT
Start: 2024-04-01

## 2024-02-23 RX ORDER — ASPIRIN 81 MG/1
81 TABLET, CHEWABLE ORAL 2 TIMES DAILY
Qty: 60 TABLET | Refills: 11 | Status: SHIPPED | OUTPATIENT
Start: 2024-02-23

## 2024-02-23 RX ORDER — ONDANSETRON 2 MG/ML
8 INJECTION INTRAMUSCULAR; INTRAVENOUS EVERY 6 HOURS PRN
Status: DISCONTINUED | OUTPATIENT
Start: 2024-02-23 | End: 2024-02-23 | Stop reason: HOSPADM

## 2024-02-23 RX ORDER — DEFERASIROX 360 MG/1
1440 TABLET, FILM COATED ORAL EVERY EVENING
Qty: 120 TABLET | Refills: 4 | Status: SHIPPED | OUTPATIENT
Start: 2024-02-23 | End: 2024-08-22

## 2024-02-23 RX ORDER — HEPARIN SODIUM (PORCINE) LOCK FLUSH IV SOLN 100 UNIT/ML 100 UNIT/ML
5 SOLUTION INTRAVENOUS ONCE
Status: COMPLETED | OUTPATIENT
Start: 2024-02-23 | End: 2024-02-23

## 2024-02-23 RX ADMIN — ONDANSETRON 8 MG: 2 INJECTION INTRAMUSCULAR; INTRAVENOUS at 12:29

## 2024-02-23 RX ADMIN — Medication 5 ML: at 10:24

## 2024-02-23 RX ADMIN — DIPHENHYDRAMINE HYDROCHLORIDE 50 MG: 25 CAPSULE ORAL at 12:25

## 2024-02-23 RX ADMIN — SODIUM CHLORIDE, POTASSIUM CHLORIDE, SODIUM LACTATE AND CALCIUM CHLORIDE 1000 ML: 600; 310; 30; 20 INJECTION, SOLUTION INTRAVENOUS at 13:48

## 2024-02-23 RX ADMIN — HYDROMORPHONE HYDROCHLORIDE 1 MG: 1 INJECTION, SOLUTION INTRAMUSCULAR; INTRAVENOUS; SUBCUTANEOUS at 13:36

## 2024-02-23 RX ADMIN — HYDROMORPHONE HYDROCHLORIDE 1 MG: 1 INJECTION, SOLUTION INTRAMUSCULAR; INTRAVENOUS; SUBCUTANEOUS at 14:42

## 2024-02-23 RX ADMIN — Medication 5 ML: at 14:42

## 2024-02-23 RX ADMIN — SODIUM CHLORIDE 331 MG: 9 INJECTION, SOLUTION INTRAVENOUS at 12:34

## 2024-02-23 RX ADMIN — KETOROLAC TROMETHAMINE 30 MG: 30 INJECTION, SOLUTION INTRAMUSCULAR; INTRAVENOUS at 12:25

## 2024-02-23 RX ADMIN — SODIUM CHLORIDE 250 ML: 9 INJECTION, SOLUTION INTRAVENOUS at 12:34

## 2024-02-23 RX ADMIN — HYDROMORPHONE HYDROCHLORIDE 1 MG: 1 INJECTION, SOLUTION INTRAMUSCULAR; INTRAVENOUS; SUBCUTANEOUS at 12:25

## 2024-02-23 ASSESSMENT — PAIN SCALES - GENERAL: PAINLEVEL: EXTREME PAIN (8)

## 2024-02-23 NOTE — Clinical Note
2/23/2024         RE: Jennifer Cervantes  [address]  [facility] [address]        Dear Colleague,    Thank you for referring your patient, Jennifer Cervantes, to the [facility]. Please see a copy of my visit note below.    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 She had multiple thromboembolic events in 2021 despite adherent anticoagulation use (though warfarin was perpetually low) and there are concerns for chronic thromboembolic disease but did not have pulmonary HTN on a November 2021 cath. She is maintained on chronic PO opioids and twice-weekly infusion visits (since 1/24/22) but has been able to be maintained on this regimen and has stayed out of the ED most of the time with even rarer admissions for most of 2023.     Interval History:  Jennifer was seen in the ED two days ago on 2/21. She was working after having many weeks off due to persistent sickle cell pain. Her first shift back was an 8 hour shift. 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 She also developed nausea with the pain and vomited once at work. Her mom had to pick her up and bring her to the ER. While there, her pain responded to her routine ER opioid plan and she was able to discharge.     She continues to experience intermittent nausea and vomiting. This has not improved since starting omeprazole but she continues to take this daily. She finds relief in  "rubbing her stomach in circular motions.    She's had difficulty sleeping over the past few months. It is not uncommon for her to fall asleep for 2-3 hours and then stay awake for the rest of the night. She uses Benadryl occasionally for this but does not find this very helpful. Her home life has been okay. For the most part she is getting along with her mother and siblings. She is still dating her boyfriend and finds he is very supportive although struggles opening up to people at times based on previous relationships with family and other boyfriends       Sickle Cell Disease Comprehensive Checklist  Bone Health/Avascular Necrosis Screening/Symptoms (each visit): no new concerns today  Leg Ulcer evaluation (every visit): no  Hypertension (every visit):stable 11/3/23  Last pulmonary evaluation (asthma, AMAN, pulm HTN): 9/28/22, due for follow-up  Stroke/silent cerebral infarct Hx (Y/N): Yes TIA ~2014, first event ~age 1 with full stroke and R sided weakness  Last PCP Visit: 3/6/23  Vaccines:  PCV13: 5/13/19  Pneumovax (PPSV23): 3/04, 10/09, 7/12/19 (next due 7/2024)  Menactra: 4/2010, 9/2015 (MCV done 8/16/21)  MenB: 9/16/15, 5/13/19  Influenza: 10/2/23  Audiology (chelation): done 6/2020, normal.. However, on 6/7, \"While there is no significant change in grade on the CTCAE 4.03 Scale, there were hearing changes bilaterally for both standard and extended high frequency audiometry.  Will need to determine if an ENT consult is advised due to the asymmetry in extended high frequency testing.\"     Plan last reviewed with patient: 10/2/23    Patient background: 24 yo F, enjoys movies and kids though there are times where she does not really want to talk to people. Does not have a lot of social support at home.     Sickle Cell Disease History  Primary Hematologist Team: Jose Rafael Duncan  PCP: none  Genotype: SS  Acute Pain Crisis Treatment: (limiting IV   ER   Dilaudid 1 mg IV q1h up to 3 doses  Toradol 30 mg IV x1 "   Maintenance IV fluids with LR  Other: Zofran 8 mg IV PRN nausea  Inpatient:  PCA Dilaudid 1 hr q30 minutes, no basal rate  Toradol 30 mg x6h x 4 hr  LR maintenance x 1-2 days  Other Medications: Zofran  ASA  Supportive Care: Docusate, Senna  Chronic Pain Medications:    Home regimen: oxycodone 15 mg p.o. q.4-6 hours p.r.n. breakthrough pain.  She also continues on Voltaren gel, and Zoloft among other medications.    -Also benefits from mental health visits, acupuncture  Baseline Hemoglobin: 7 g/dl without chronic transfusions  Hydroxyurea use: Yes  H/O blood transfusions: Yes, several (iron overload) Most recent 11/20/2021  H/O Transfusion Reactions: no  Antibodies:none  H/O Acute Chest Syndrome: Yes  Last episode:9/05/22 (previously 4/26/21, 10/2019)   ICU/intubation: not with 9/2022 admission  H/O Stroke: Yes (managed with chronic transfusions in the past, stopped late Spring 2020)  H/O VTE: Yes (2/2021)  H/O Cholecystectomy or Splenectomy: no  H/O Asthma, Pulm HTN, AVN, Leg Ulcers, Nephropathy, Retinopathy, etc: Iron overload, asthma, chronic lung disease, physical limitations from early stroke    ---------------------------------------  Jennifer Cervantes's Goals (discussed today 2/23/24)    1-3 month goal:  Return to work, hopefully with shorter shifts. Would like to complete [facility] paperwork to keep her job safe.    6 month goal:      12 month goal:      Disease-specific goal(s):  Decrease frequency of ED and infusion center visits. Ultimately would like to decrease oxycodone use.  ---------------------------------------      Current Outpatient Medications   Medication Sig Dispense Refill    aspirin (ASA) 81 MG chewable tablet Take 1 tablet (81 mg) by mouth 2 times daily 60 tablet 11    deferasirox (JADENU) 360 MG tablet Take 4 tablets (1,440 mg) by mouth every evening 120 tablet 4    Hydroxyurea 1000 MG TABS Take 3,000 mg by mouth daily 90 tablet 3    melatonin 5 MG tablet Take 1 tablet (5 mg) by mouth nightly as  needed for sleep 30 tablet 1    acetaminophen (TYLENOL) 325 MG tablet Take 2 tablets (650 mg) by mouth every 6 hours as needed for mild pain 120 tablet 3    albuterol (PROAIR HFA/PROVENTIL HFA/VENTOLIN HFA) 108 (90 Base) MCG/ACT inhaler Inhale 2 puffs into the lungs every 6 hours as needed for shortness of breath or wheezing 8.5 g 3    albuterol (PROVENTIL) (2.5 MG/3ML) 0.083% neb solution Take 2 vials (5 mg) by nebulization every 6 hours as needed for shortness of breath or wheezing 90 mL 3    budesonide-formoterol (SYMBICORT) 160-4.5 MCG/ACT Inhaler Inhale 2 puffs twice daily plus 1-2 puffs as needed. May use up to 12 puffs per day. 20.4 g 11    budesonide-formoterol (SYMBICORT) 160-4.5 MCG/ACT Inhaler Inhale 2 puffs into the lungs 2 times daily 10.2 g 3    cetirizine (ZYRTEC) 10 MG tablet Take 1 tablet (10 mg) by mouth daily 30 tablet 1    diphenhydrAMINE (BENADRYL) 25 MG capsule Take 1 capsule (25 mg) by mouth every 6 hours as needed for itching or allergies 30 capsule 0    EPINEPHrine (ANY BX GENERIC EQUIV) 0.3 MG/0.3ML injection 2-pack Inject 0.3 mLs (0.3 mg) into the muscle as needed for anaphylaxis 1 each 1    methocarbamol (ROBAXIN) 750 MG tablet Take 1 tablet (750 mg) by mouth 4 times daily as needed for muscle spasms (during sickle pain crises. Okay to take scheduled while in pain) 60 tablet 1    naloxone (NARCAN) 4 MG/0.1ML nasal spray Spray 4 mg into one nostril alternating nostrils as needed for opioid reversal every 2-3 minutes until assistance arrives (Patient not taking: Reported on 2/5/2024)      omeprazole (PRILOSEC) 20 MG DR capsule Take 1 capsule (20 mg) by mouth daily 30 capsule 0    ondansetron (ZOFRAN) 8 MG tablet Take 1 tablet (8 mg) by mouth every 8 hours as needed for nausea 30 tablet 1    oxyCODONE IR (ROXICODONE) 10 MG tablet Take 1 tablet (10 mg) by mouth every 4 hours as needed for severe pain or breakthrough pain Goal 4 per day. Max 6 per day. 20 tablet 0       Past Medical  History  Past Medical History:   Diagnosis Date    Anxiety     Bleeding disorder (H24)     Blood clotting disorder (H24)     Cerebral infarction (H) 2015    Cognitive developmental delay     low IQ. 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 side (H)     right hand contractures    Iron overload due to repeated red blood cell transfusions     Migraines     Multiple subsegmental pulmonary emboli without acute cor pulmonale (H) 02/01/2021    Oppositional defiant behavior     Presence of intrauterine contraceptive device 2/18/2020    Superficial venous thrombosis of arm, right 03/25/2021    Uncomplicated asthma      Past Surgical History:   Procedure Laterality Date    AS INSERT TUNNELED CV 2 CATH W/O PORT/PUMP      CHOLECYSTECTOMY      CV RIGHT HEART CATH MEASUREMENTS RECORDED N/A 11/18/2021    Procedure: Right Heart Cath;  Surgeon: Jackson Stauffer MD;  Location:  HEART CARDIAC CATH LAB    INSERT PORT VASCULAR ACCESS Left 4/21/2021    Procedure: INSERTION, VASCULAR ACCESS PORT (NOT SURE ON SIDE UNTIL REMOVAL);  Surgeon: Rajan More MD;  Location: [facility] OR    IR CHEST PORT PLACEMENT > 5 YRS OF AGE  4/21/2021    IR CVC NON TUNNEL LINE REMOVAL  5/6/2021    IR CVC NON TUNNEL PLACEMENT > 5 YRS  04/07/2020    IR CVC NON TUNNEL PLACEMENT > 5 YRS  4/30/2021    IR CVC NON TUNNEL PLACEMENT > 5 YRS  9/7/2022    IR PORT REMOVAL LEFT  4/21/2021    REMOVE PORT VASCULAR ACCESS Left 4/21/2021    Procedure: REMOVAL, VASCULAR ACCESS PORT LEFT;  Surgeon: Rajan More MD;  Location: [facility] OR    REPAIR TENDON ELBOW Right 10/02/2019    Procedure: Right Elbow Flexor Lengthening, Flexor Pronator Slide Of Wrist and Finger, Thumb Adductor Lengthening;  Surgeon: Anai Franco MD;  Location: [facility] OR    TONSILLECTOMY Bilateral 10/02/2019    Procedure: Bilateral Tonsillectomy;  Surgeon: Farhana Guy  MD Ruchi;  Location: [facility] BREAST REDUCTION (INCLUDES LIPO) TIER 3 Bilateral 04/23/2019     Allergies   Allergen Reactions    Contrast Dye      Hives and breathing issues    Fish-Derived Products Hives    Seafood Hives    Adhesive Tape Hives     Primipore dressing causes hives    Gadolinium     Iodinated Contrast Media      Social History   Social History     Tobacco Use    Smoking status: Never     Passive exposure: Never    Smokeless tobacco: Never   Substance Use Topics    Alcohol use: Not Currently     Alcohol/week: 0.0 standard drinks of alcohol    Drug use: Never    Still living at home with mom and extended family.   Past medical history and social history were reviewed.    Physical Examination:  /78 (BP Location: Right arm, Patient Position: Sitting, Cuff Size: Adult Regular)   Pulse 82   Temp 98.1  F (36.7  C) (Oral)   Resp 16   SpO2 96%       Wt Readings from Last 10 Encounters:   02/03/24 67.1 kg (148 lb)   01/26/24 66.2 kg (145 lb 14.4 oz)   01/12/24 66.2 kg (146 lb)   01/06/24 67.6 kg (149 lb)   01/04/24 67.9 kg (149 lb 12.8 oz)   12/28/23 68 kg (149 lb 14.4 oz)   12/22/23 68 kg (150 lb)   12/20/23 67 kg (147 lb 11.2 oz)   12/08/23 68 kg (150 lb)   12/01/23 66.9 kg (147 lb 6.4 oz)     General: Pleasant female, NAD  Eyes: EOMI, PERRL. Mild. scleral icterus.  Respiratory: Faint expiratory wheeze in LLL, otherwise CTA  CV: rrr, no m/g/r.  GI: Soft, NT. No hepatosplenomegaly noted.  Ext: No peripheral edema.  MSK: Contractured right arm and hand 2/2 stoke.  Neurologic: Grossly nonfocal. A/O x 4.  Skin: No rashes, petechiae, or bruising noted on exposed skin. Port accessed in left chest    Laboratory Data:  Most Recent 3 CBC's:  Recent Labs   Lab Test 02/23/24  1024 02/21/24  1315 02/17/24  0113 02/09/24  2112   WBC 12.8* 19.3* 14.7* 10.6   HGB 7.6* 7.3* 7.6* 8.6*   MCV 86 86 85 88   * 418 460* 390   ANEUTAUTO 8.2 15.7*  --  6.3    Most Recent 3 BMP's:  Recent Labs   Lab Test  02/23/24  1024 02/21/24  1315 02/17/24  0113 02/09/24  2112    140 137 138   POTASSIUM 3.6 3.9 4.0 4.1   CHLORIDE 106 105 102 102   CO2 22 23 24 24   BUN 8.5 7.8 18.7 9.2   CR 0.49* 0.53 0.65 0.53   ANIONGAP 11 12 11 12   MICAH 9.0 9.0 8.9 8.7   GLC 87 88 86 90   PROTTOTAL  --  7.6 7.7 7.1   ALBUMIN  --  4.6 4.6 4.2    Most Recent 2 LFT's:  Recent Labs   Lab Test 02/21/24  1315 02/17/24  0113   AST 74* 54*   ALT 29 25   ALKPHOS 61 65   BILITOTAL 4.4* 2.9*    Most Recent TSH and T4:No lab results found.  Phos/Mag:  Lab Results   Component Value Date    PHOS 4.8 (H) 05/13/2023    PHOS 5.0 (H) 05/12/2023    PHOS 3.6 02/21/2021    MAG 2.0 11/11/2021    MAG 1.7 02/21/2021    MAG 2.1 02/02/2021        I reviewed the above labs today.    Assessment and Plan:  1. Sickle Cell HgbSS Disease  2. Acute pain crises  3. Chronic Pain  4. 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 History of CVA  7. Hearing loss  8. Nausea/vomiting     Jennifer is seen for routine sickle cell follow-up and sidney today. She has been experiencing an increase in sickle cell pain over the past 4 months leading to increased ED visits. Her pain has been limiting her ability to work although she feels motivated to      She continues to pursue infusion room visits for IV hydration and pain control although some weeks is only able to get in for one infusion due to capacity. Due to this increased pain we have not recently been able to move forward with further tapering of oxycodone although this is still very much a goal of Jennifer's. A left upper extremity US was performed on 12/20/23 to rule out DVT which was negative.      Her breathing and cough are improving. She does not currently feel that her asthma is uncontrolled. Pulmonology/asthma follow-up is scheduled next month to ensure she does not need any medication changes.      I will discuss resumption of IV desferal with Dr. Duncan, however, we  discussed it may be appropriate to take a longer break from treatment and continue Jadenu given improvement on her last Ferriscan from September 2023 as well as due to ototoxicity and ocular risks with chronic treatment.     She will receive IV fluids and pain medication along with sidney today. As we move in to the near year, Jennifer is focused on her goal to decrease ED visits and stay on top of her pain control. We will continue to readdress or shared goal of reducing oxycodone use moving forward although have no plans for prescription adjustment today.      We do need to make sure she gets back into audiology and ophthalmology annually due to chelator. Last eye exam was recently completed on 10/25/23. Audiology visit scheduled 2/7/24.        Plan:  -Pulmonology follow-up as planned January 2024  -Continue Hydrea to 3000mg daily to help lessen frequency of sickle cell pain.   -Continue monthly crizanlizumab infusions.  -Continue slow taper of oxycodone. Currently receiving oxycodone 10 mg every 4 hours PRN, qty 25. Recommend tapering to qty 20 with next refill.  -She can self-reduce infusion days/week and we will continue to keep the cap at 2/week for now.  -Continue infusion center visits limited to two times per week (Mondays and Fridays ideally although still needs to call to request). Continue diligent home management with current medications, heat, rest, compression, warm baths. Methocarbamol was recently added which Jennifer is utilizing and finds helpful.  -If unable to manage at home can go to ED  with continued plan to use IV Dilaudid and take a break from ketamine (10/2/23). No PCA use and goal for any admission would still be to discharge by 5 days or less  - Desferal infusions on hold. Continue Jadenu. Repeat Ferriscan in 4-6 months (January-March 2024). Will discuss possible resumption with Dr. Duncan.  -Reviewed tactics to control anxiety. I have hesitancy to prescribe Xanax even if she has used this in  the past due to her concurrent use with oxycodone. Suggested trial of selective serotonin reuptake inhibitor or could pursue psychotherapy. Will also discuss with Dr. Duncan.  -Continue aspirin BID  -RTC with me in 2 months         Sickle cell disease  Fever  Shortness of breath  Dysgeusia  Reported fever over the weekend with associated dyspnea and dysgeusia. Home covid test negative. Will repeat covid PCR and RVP today. Hgb has dropped to 7.0. Will plan for tentative blood transfusion with 1 unit PRBC this week as she has historically dropped to the 6 range and required blood with viral illness in the past.   -Covid and RVP swab today  -1 unit PRBC 2/8  -RTC 2/23 for ANDREAS visit with next sidney infusion      Abdominal pain  Abdominal distention  Nausea/vomiting   Increase in episodes of vomiting associated with nausea, fatigue and abdominal bloating over the past ~2 months. She has history of cholecystectomy, unclear of when this was but she believes it was when she was followed at [facility] shortly prior to transfer to the adult clinic at [facility]. Lipase 1/26 normal so low concern for pancreatitis. H. Pylori testing was ordered but ultimately Jennifer refused this. Bowels are very regular so low concern for bowel obstruction. Differentials include gastroparesis, GERD or PUD. She has now started on a PPI but only a few days ago so I wouldn't expect a significant response yet. Discussed today that h. Pylori testing could now be influenced by PPI use. Regardless, she doesn't want to proceed with this. We reviewed potential differentials especially in the setting of sickle cell disease and the typical role of imaging. She had an upper endoscopy in 2020 at [facility] which I recommend repeating at this point. Jennifer is agreeable to this.  -Upper endoscopy next available      Mood disorder  Anxiety  Depression  Jennifer has inquired about benzodiazepine use and is aware that this is not recommended to use concurrently with  opioids. 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 She had multiple thromboembolic events in 2021 despite adherent anticoagulation use (though warfarin was perpetually low) and there are concerns for chronic thromboembolic disease but did not have pulmonary HTN on a November 2021 cath. She is maintained on chronic PO opioids and twice-weekly infusion visits (since 1/24/22) but has been able to be maintained on this regimen and has stayed out of the ED most of the time with even rarer admissions for most of 2023.     Interval History:  Jennifer was seen in the ED two days ago on 2/21. She was working after having many weeks off due to persistent sickle cell pain. Her first shift back was an 8 hour shift. 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 She also developed nausea with the pain and vomited once at work. Her mom had to pick her up and bring her to the ER. While there, her pain responded to her routine ER opioid plan and she was able to discharge.     She continues to experience intermittent nausea and vomiting. This has not improved since  "starting omeprazole but she continues to take this daily. She finds relief in rubbing her stomach in circular motions.    She's had difficulty sleeping over the past few months. It is not uncommon for her to fall asleep for 2-3 hours and then stay awake for the rest of the night. She uses Benadryl occasionally for this but does not find this very helpful. Her home life has been okay. For the most part she is getting along with her mother and siblings. She is still dating her boyfriend and finds he is very supportive although struggles opening up to people at times based on previous relationships with family and other boyfriends     Sickle Cell Disease Comprehensive Checklist  Bone Health/Avascular Necrosis Screening/Symptoms (each visit): no new concerns today  Leg Ulcer evaluation (every visit): no  Hypertension (every visit):stable 11/3/23  Last pulmonary evaluation (asthma, AMAN, pulm HTN): 9/28/22, due for follow-up  Stroke/silent cerebral infarct Hx (Y/N): Yes TIA ~2014, first event ~age 1 with full stroke and R sided weakness  Last PCP Visit: 3/6/23  Vaccines:  PCV13: 5/13/19  Pneumovax (PPSV23): 3/04, 10/09, 7/12/19 (next due 7/2024)  Menactra: 4/2010, 9/2015 (MCV done 8/16/21)  MenB: 9/16/15, 5/13/19  Influenza: 10/2/23  Audiology (chelation): done 6/2020, normal.. However, on 6/7, \"While there is no significant change in grade on the CTCAE 4.03 Scale, there were hearing changes bilaterally for both standard and extended high frequency audiometry.  Will need to determine if an ENT consult is advised due to the asymmetry in extended high frequency testing.\"     Plan last reviewed with patient: 10/2/23    Patient background: 22 yo F, enjoys movies and kids though there are times where she does not really want to talk to people. Does not have a lot of social support at home.     Sickle Cell Disease History  Primary Hematologist Team: Jose Rafael Duncan  PCP: none  Genotype: SS  Acute Pain Crisis Treatment: " (limiting IV   ER   Dilaudid 1 mg IV q1h up to 3 doses  Toradol 30 mg IV x1   Maintenance IV fluids with LR  Other: Zofran 8 mg IV PRN nausea  Inpatient:  PCA Dilaudid 1 hr q30 minutes, no basal rate  Toradol 30 mg x6h x 4 hr  LR maintenance x 1-2 days  Other Medications: Zofran  ASA  Supportive Care: Docusate, Senna  Chronic Pain Medications:    Home regimen: oxycodone 15 mg p.o. q.4-6 hours p.r.n. breakthrough pain.  She also continues on Voltaren gel, and Zoloft among other medications.    -Also benefits from mental health visits, acupuncture  Baseline Hemoglobin: 7 g/dl without chronic transfusions  Hydroxyurea use: Yes  H/O blood transfusions: Yes, several (iron overload) Most recent 11/20/2021  H/O Transfusion Reactions: no  Antibodies:none  H/O Acute Chest Syndrome: Yes  Last episode:9/05/22 (previously 4/26/21, 10/2019)   ICU/intubation: not with 9/2022 admission  H/O Stroke: Yes (managed with chronic transfusions in the past, stopped late Spring 2020)  H/O VTE: Yes (2/2021)  H/O Cholecystectomy or Splenectomy: no  H/O Asthma, Pulm HTN, AVN, Leg Ulcers, Nephropathy, Retinopathy, etc: Iron overload, asthma, chronic lung disease, physical limitations from early stroke    ---------------------------------------  Jennifer Cervantes's Goals (discussed today 2/23/24)    1-3 month goal:  Return to work, hopefully with shorter shifts. Would like to complete [facility] paperwork to keep her job safe.    6 month goal:      12 month goal:      Disease-specific goal(s):  Decrease frequency of ED and infusion center visits. Ultimately would like to decrease oxycodone use.  ---------------------------------------      Current Outpatient Medications   Medication Sig Dispense Refill     aspirin (ASA) 81 MG chewable tablet Take 1 tablet (81 mg) by mouth 2 times daily 60 tablet 11     deferasirox (JADENU) 360 MG tablet Take 4 tablets (1,440 mg) by mouth every evening 120 tablet 4     Hydroxyurea 1000 MG TABS Take 3,000 mg by mouth daily  90 tablet 3     melatonin 5 MG tablet Take 1 tablet (5 mg) by mouth nightly as needed for sleep 30 tablet 1     acetaminophen (TYLENOL) 325 MG tablet Take 2 tablets (650 mg) by mouth every 6 hours as needed for mild pain 120 tablet 3     albuterol (PROAIR HFA/PROVENTIL HFA/VENTOLIN HFA) 108 (90 Base) MCG/ACT inhaler Inhale 2 puffs into the lungs every 6 hours as needed for shortness of breath or wheezing 8.5 g 3     albuterol (PROVENTIL) (2.5 MG/3ML) 0.083% neb solution Take 2 vials (5 mg) by nebulization every 6 hours as needed for shortness of breath or wheezing 90 mL 3     budesonide-formoterol (SYMBICORT) 160-4.5 MCG/ACT Inhaler Inhale 2 puffs twice daily plus 1-2 puffs as needed. May use up to 12 puffs per day. 20.4 g 11     budesonide-formoterol (SYMBICORT) 160-4.5 MCG/ACT Inhaler Inhale 2 puffs into the lungs 2 times daily 10.2 g 3     cetirizine (ZYRTEC) 10 MG tablet Take 1 tablet (10 mg) by mouth daily 30 tablet 1     diphenhydrAMINE (BENADRYL) 25 MG capsule Take 1 capsule (25 mg) by mouth every 6 hours as needed for itching or allergies 30 capsule 0     EPINEPHrine (ANY BX GENERIC EQUIV) 0.3 MG/0.3ML injection 2-pack Inject 0.3 mLs (0.3 mg) into the muscle as needed for anaphylaxis 1 each 1     methocarbamol (ROBAXIN) 750 MG tablet Take 1 tablet (750 mg) by mouth 4 times daily as needed for muscle spasms (during sickle pain crises. Okay to take scheduled while in pain) 60 tablet 1     naloxone (NARCAN) 4 MG/0.1ML nasal spray Spray 4 mg into one nostril alternating nostrils as needed for opioid reversal every 2-3 minutes until assistance arrives (Patient not taking: Reported on 2/5/2024)       omeprazole (PRILOSEC) 20 MG DR capsule Take 1 capsule (20 mg) by mouth daily 30 capsule 0     ondansetron (ZOFRAN) 8 MG tablet Take 1 tablet (8 mg) by mouth every 8 hours as needed for nausea 30 tablet 1     oxyCODONE IR (ROXICODONE) 10 MG tablet Take 1 tablet (10 mg) by mouth every 4 hours as needed for severe pain  or breakthrough pain Goal 4 per day. Max 6 per day. 20 tablet 0       Past Medical History  Past Medical History:   Diagnosis Date     Anxiety      Bleeding disorder (H24)      Blood clotting disorder (H24)      Cerebral infarction (H) 2015     Cognitive developmental delay     low IQ. Please recognize when managing pain and planning with her     Depressive disorder      Hemiplegia and hemiparesis following cerebral infarction affecting right dominant side (H)     right hand contractures     Iron overload due to repeated red blood cell transfusions      Migraines      Multiple subsegmental pulmonary emboli without acute cor pulmonale (H) 02/01/2021     Oppositional defiant behavior      Presence of intrauterine contraceptive device 2/18/2020     Superficial venous thrombosis of arm, right 03/25/2021     Uncomplicated asthma      Past Surgical History:   Procedure Laterality Date     AS INSERT TUNNELED CV 2 CATH W/O PORT/PUMP       CHOLECYSTECTOMY       CV RIGHT HEART CATH MEASUREMENTS RECORDED N/A 11/18/2021    Procedure: Right Heart Cath;  Surgeon: Jackson Stauffer MD;  Location:  HEART CARDIAC CATH LAB     INSERT PORT VASCULAR ACCESS Left 4/21/2021    Procedure: INSERTION, VASCULAR ACCESS PORT (NOT SURE ON SIDE UNTIL REMOVAL);  Surgeon: Rajan More MD;  Location: [facility] OR     IR CHEST PORT PLACEMENT > 5 YRS OF AGE  4/21/2021     IR CVC NON TUNNEL LINE REMOVAL  5/6/2021     IR CVC NON TUNNEL PLACEMENT > 5 YRS  04/07/2020     IR CVC NON TUNNEL PLACEMENT > 5 YRS  4/30/2021     IR CVC NON TUNNEL PLACEMENT > 5 YRS  9/7/2022     IR PORT REMOVAL LEFT  4/21/2021     REMOVE PORT VASCULAR ACCESS Left 4/21/2021    Procedure: REMOVAL, VASCULAR ACCESS PORT LEFT;  Surgeon: Rajan More MD;  Location: [facility] OR     REPAIR TENDON ELBOW Right 10/02/2019    Procedure: Right Elbow Flexor Lengthening, Flexor Pronator Slide Of Wrist and Finger, Thumb Adductor Lengthening;  Surgeon: Anai Franco MD;  Location:  OR      TONSILLECTOMY Bilateral 10/02/2019    Procedure: Bilateral Tonsillectomy;  Surgeon: Farhana Guy MD;  Location:  OR     [facility] BREAST REDUCTION (INCLUDES LIPO) TIER 3 Bilateral 04/23/2019     Allergies   Allergen Reactions     Contrast Dye      Hives and breathing issues     Fish-Derived Products Hives     Seafood Hives     Adhesive Tape Hives     Primipore dressing causes hives     Gadolinium      Iodinated Contrast Media      Social History   Social History     Tobacco Use     Smoking status: Never     Passive exposure: Never     Smokeless tobacco: Never   Substance Use Topics     Alcohol use: Not Currently     Alcohol/week: 0.0 standard drinks of alcohol     Drug use: Never    Still living at home with mom and extended family.   Past medical history and social history were reviewed.    Physical Examination:  /78 (BP Location: Right arm, Patient Position: Sitting, Cuff Size: Adult Regular)   Pulse 82   Temp 98.1  F (36.7  C) (Oral)   Resp 16   SpO2 96%       Wt Readings from Last 10 Encounters:   02/03/24 67.1 kg (148 lb)   01/26/24 66.2 kg (145 lb 14.4 oz)   01/12/24 66.2 kg (146 lb)   01/06/24 67.6 kg (149 lb)   01/04/24 67.9 kg (149 lb 12.8 oz)   12/28/23 68 kg (149 lb 14.4 oz)   12/22/23 68 kg (150 lb)   12/20/23 67 kg (147 lb 11.2 oz)   12/08/23 68 kg (150 lb)   12/01/23 66.9 kg (147 lb 6.4 oz)     General: Pleasant female, NAD  Eyes: EOMI, PERRL. Mild. scleral icterus.  Respiratory: Faint expiratory wheeze in LLL, otherwise CTA  CV: rrr, no m/g/r.  GI: Soft, NT. No hepatosplenomegaly noted.  Ext: No peripheral edema.  MSK: Contractured right arm and hand 2/2 stoke.  Neurologic: Grossly nonfocal. A/O x 4.  Skin: No rashes, petechiae, or bruising noted on exposed skin. Port accessed in left chest    Laboratory Data:  Most Recent 3 CBC's:  Recent Labs   Lab Test 02/23/24  1024 02/21/24  1315 02/17/24  0113 02/09/24  2112   WBC 12.8* 19.3* 14.7* 10.6   HGB 7.6* 7.3* 7.6* 8.6*   MCV 86  86 85 88   * 418 460* 390   ANEUTAUTO 8.2 15.7*  --  6.3    Most Recent 3 BMP's:  Recent Labs   Lab Test 02/23/24  1024 02/21/24  1315 02/17/24  0113 02/09/24  2112    140 137 138   POTASSIUM 3.6 3.9 4.0 4.1   CHLORIDE 106 105 102 102   CO2 22 23 24 24   BUN 8.5 7.8 18.7 9.2   CR 0.49* 0.53 0.65 0.53   ANIONGAP 11 12 11 12   MICAH 9.0 9.0 8.9 8.7   GLC 87 88 86 90   PROTTOTAL  --  7.6 7.7 7.1   ALBUMIN  --  4.6 4.6 4.2    Most Recent 2 LFT's:  Recent Labs   Lab Test 02/21/24  1315 02/17/24  0113   AST 74* 54*   ALT 29 25   ALKPHOS 61 65   BILITOTAL 4.4* 2.9*    Most Recent TSH and T4:No lab results found.  Phos/Mag:  Lab Results   Component Value Date    PHOS 4.8 (H) 05/13/2023    PHOS 5.0 (H) 05/12/2023    PHOS 3.6 02/21/2021    MAG 2.0 11/11/2021    MAG 1.7 02/21/2021    MAG 2.1 02/02/2021        I reviewed the above labs today.    Assessment and Plan:  1. Sickle Cell HgbSS Disease  2. Acute pain crises  3. Chronic Pain  4. 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 History of CVA  7. Hearing loss  8. Nausea/vomiting     Jennifer is seen for routine sickle cell follow-up and sidney today. She has been experiencing an increase in sickle cell pain over the past 4 months leading to increased ED visits. Her pain has been limiting her ability to work although she feels motivated to return to work and get back into an normal routine. She's interesting in completing [place] paperwork. I encouraged her to reach out to her manager or HR department for the appropriate paperwork.     She continues to pursue infusion room visits for IV hydration and pain control. Her ability to get in for infusion is variable based on capacity. This is typically when she turns to ED utilization for pain control. We have neither increased or decreased her oxycodone prescriptions for many months now and did not discuss this in detail today. Based on recent discussions though, she is still  motivated to reduce oxycodone use and has wanted to wait until her pain was better controlled. She is currently receiving weekly prescriptions, 10 mg with a total quantity of 20 tablets.    She was seen by pulmonology on 2/9/24 for follow-up of asthma. She was advised to add Symbicort as needed in addition to her twice daily routine use which she has started. She denies any recent asthma flares but does have an occasional cough which she feels originates deep in her lungs.    I advised a trial of melatonin for sleep. She voiced some concern that she heard on TikTok this wasn't supposed to be used long-term. I think this is a much more acceptable pharmacological option compared to alternative medications which have the potential to cause more significant side effects or drug interactions.     Based on previous discussion Dr. Duncan we've decided to continue taking a break from IV desferal and continue Jadenu given improvement on her last Ferriscan from September 2023 as well as due to ototoxicity and ocular risks with chronic treatment. We do need to make sure she gets back into audiology and ophthalmology annually due to chelator. Last eye exam was recently completed on 10/25/23. Audiology visit scheduled 2/27/24.    Persistent nausea and vomiting have been a newer concern in the past few months. Lipase level was previously checked and is normal. She refused H. pylori testing but was agreeable to trial a PPI which has not resulted in improvement. We previously discussed pursuing an upper endoscopy which she is agreeable to.        Plan:  -Continue Hydrea to 3000mg daily to help lessen frequency of sickle cell pain.   -Continue monthly crizanlizumab infusions.  -Continue slow taper of oxycodone. Currently receiving oxycodone 10 mg every 4 hours PRN, qty 20. Recommend tapering to qty 15 when she is agreeable.  -She can self-reduce infusion days/week and we will continue to keep the cap at 2/week for now.  -Continue  infusion center visits limited to two times per week (Mondays and Fridays ideally although still needs to call to request). Continue diligent home management with current medications, heat, rest, compression, warm baths. Methocarbamol was recently added which Jennifer is utilizing and finds helpful.  -If unable to manage at home can go to ED  with continued plan to use IV Dilaudid and take a break from ketamine (10/2/23). No PCA use and goal for any admission would still be to discharge by 5 days or less  - Desferal infusions on hold. Continue Jadenu. Repeat Ferriscan in 4-6 months (January-March 2024). Requested scheduling today.  -Continue aspirin BID  -Upper GI endoscopy for evaluation of persistent n/v  -RTC with Dr. Duncan in 1 month     75 minutes spent on the date of the encounter doing chart review, review of test results, interpretation of tests, patient visit, and documentation     Patricia Mantilla CNP        Again, thank you for allowing me to participate in the care of your patient.        Sincerely,        Patricia Mantilla CNP

## 2024-02-23 NOTE — TELEPHONE ENCOUNTER
Oncology Nurse Triage - Sickle Cell Pain Crisis:    Situation: Jennifer  calling about Sickle Cell Pain Crisis, requesting to be added on for IV fluids and pain medicine    Background:     Patient's last infusion was 2/21/2024  Last clinic visit date:2/5/2024 Patricia Mantilla  Does patient have active treatment plan?  Yes      Assessment of Symptoms:  Onset/Duration of symptoms: 1 day    Is it typical sickle cell pain? Yes   Location: general  Character: Sharp           Intensity: 8/10    Any radiation of pain, numbness, tingling, weakness, warmth, swelling, discoloration of arms or legs?No     Fever?No    Chest Pain Present: No     Shortness of breath: No     Other home therapies tried: HEAT/HEATING PAD and WARM BATH     Last home medication taken and when: oxycodone 10mg taken aruond 6am    Any Refills Needed?: No     Does patient have transportation & length of time to get to clinic: Yes   Has 10am for labs and 10:30am Patricia Mantilla CNP appt with Jr infusion after      Recommendations:   0611 Paged Patricia Mantilla CNP    1910 approved by Patricia to add on IVF/Pain to plan for today.

## 2024-02-23 NOTE — PROGRESS NOTES
AUDIOLOGY REPORT    SUBJECTIVE: Jennifer Cervantes is a 23 year old female who was seen in the Audiology Clinic at the [facility] for audiologic evaluation, referred by Patricia Mantilla C.N.P. Patient has a history of sickle cell disease and iron overload due to repeated blood cell transfusion. 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 Previous audiologic ototoxic monitoring on 6/6/22 revealed normal hearing bilaterally. Jennifer reports bilateral tinnitus abd denies changes in hearing, pain, drainage, dizziness or ear surgery.      OBJECTIVE:  Abuse Screening:  Do you feel unsafe at home or work/school? No  Do you feel threatened by someone? No  Does anyone try to keep you from having contact with others, or doing things outside of your home? No  Physical signs of abuse present? No    Otoscopic exam indicated:    RIGHT: Minimal cerumen     LEFT: Minimal cerumen     Pure Tone Thresholds assessed using conventional audiometry with good, reliability from 250-8000 Hz bilaterally using insert earphones and circumaural headphones     RIGHT: Normal hearing    LEFT: Normal hearing     High Frequency Audiometry was performed from 9,000-16,000 Hz:     RIGHT: Present and outside age norms from 9-16 kHz    LEFT: Present and within age norms from 9-11.2 kHz, and outside age norms from 12.5-16 kHz    Distortion Product Otoacoustic Emissions (DPOAEs) were performed from 1,500-10,000 Hz (Titan equipment):    RIGHT: Present from 1.5-4.2 kHz, absent from 5-10 kHz    LEFT: Present from 1.5-5 kHz, absent from 6-8.5 kHz, present at 10 kHz     Tympanogram:    RIGHT: normal eardrum mobility    LEFT: normal eardrum mobility    Reflexes (reported by stimulus ear):    RIGHT: Ipsilateral: present at normal levels    RIGHT: Contralateral: present at normal levels    LEFT:  Ipsilateral: present at normal levels    LEFT: Contralateral: present at normal levels    Speech Reception  Threshold:    RIGHT: 15 dB HL    LEFT: 10 dB HL    Word Recognition Score:     RIGHT: 88% at 55 dB HL using NU-6 recorded word list.    LEFT:  100% at 55 dB HL using NU-6 recorded word list.      ASSESSMENT:     Comparison to Previous Audiogram dated 6/6/2022:    Pure Tone Thresholds 250-8000 Hz:    RIGHT: stable    LEFT: stable    High Frequency Audiometry 9,000-16,000Hz:     RIGHT: stable    LEFT: stable    Word Recognition Score:    RIGHT: stable    LEFT: stable      Comparison to Baseline Audiogram dated 7/29/2020:    Word Recognition Score:    RIGHT: stable    LEFT: stable    High Frequency Audiometry 9,000-16,000Hz:     RIGHT: significant change from 12.5-14 kHz    LEFT: significant change from 12.5-16 kHz  *A significant change obtained in high frequency audiometry does not always indicate a shift in the patient's Grade. Please see below for Grading information.       CTCAE4.03 Scale Grade:  *Grading based on comparison to patient's Baseline Audiogram (on a 1, 2, 3, 4, 6 and 8 kHz audiogram) dated 7/29/2020:      RIGHT: No Grade, stable  LEFT: No Grade, stable      PLAN:  Patient was counseled regarding today's results.  It is recommended that the patient return for testing based on physician protocol and recommendation or sooner should changes or new symptoms be noted. Please call this clinic with questions regarding these results or recommendations.      Dave Linares, CCC-A  Clinical Audiologist  [place] #[id number]       CC:  MD Patricia Damon CNP

## 2024-02-23 NOTE — NURSING NOTE
"Chief Complaint   Patient presents with    Oncology Clinic Visit     Sickle cell anemia    Port Draw     Port accessed and labs drawn by rn in lab. Vital signs taken.     Port accessed by RN in lab with 20g 3/4\" power needle, labs drawn, port flushed with saline and heparin, vitals checked, pt checked in for next appointment.    Staci Napoles RN    "

## 2024-02-23 NOTE — PROGRESS NOTES
Infusion Nursing Note:  Jennifer Cervantes presents today for Jr, IVFs and pain meds.    Patient seen by provider today: Yes: Patricia Mantilla CNP   present during visit today: Not Applicable.    Note: Patient presents to the infusion center today after her provider appt. C/O 8/10 generalized muscle pain . After three doses of IV Dilaudid and IV Toradol, pain was 3/10. Patient felt comfortable being discharged home at that level.     Intravenous Access:  Implanted Port.    Treatment Conditions:   Latest Reference Range & Units 02/23/24 10:24   Sodium 135 - 145 mmol/L 139   Potassium 3.4 - 5.3 mmol/L 3.6   Chloride 98 - 107 mmol/L 106   Carbon Dioxide (CO2) 22 - 29 mmol/L 22   Urea Nitrogen 6.0 - 20.0 mg/dL 8.5   Creatinine 0.51 - 0.95 mg/dL 0.49 (L)   GFR Estimate >60 mL/min/1.73m2 >90   Calcium 8.6 - 10.0 mg/dL 9.0   Anion Gap 7 - 15 mmol/L 11   Glucose 70 - 99 mg/dL 87   WBC 4.0 - 11.0 10e3/uL 12.8 (H)   Hemoglobin 11.7 - 15.7 g/dL 7.6 (L)   Hematocrit 35.0 - 47.0 % 21.1 (L)   Platelet Count 150 - 450 10e3/uL 456 (H)   RBC Count 3.80 - 5.20 10e6/uL 2.46 (L)   MCV 78 - 100 fL 86   MCH 26.5 - 33.0 pg 30.9   MCHC 31.5 - 36.5 g/dL 36.0   RDW 10.0 - 15.0 % 24.4 (H)   % Neutrophils % 63   % Lymphocytes % 20   % Monocytes % 9   % Eosinophils % 4   % Basophils % 3   Absolute Basophils 0.0 - 0.2 10e3/uL 0.3 (H)   Absolute Eosinophils 0.0 - 0.7 10e3/uL 0.5   Absolute Immature Granulocytes <=0.4 10e3/uL 0.1   Absolute Lymphocytes 0.8 - 5.3 10e3/uL 2.6   Absolute Monocytes 0.0 - 1.3 10e3/uL 1.1   % Immature Granulocytes % 1   Absolute Neutrophils 1.6 - 8.3 10e3/uL 8.2   Absolute NRBCs 10e3/uL 0.4   NRBCs per 100 WBC <1 /100 3 (H)   % Retic 0.5 - 2.0 % 24.2 (H)   Absolute Retic 0.025 - 0.095 10e6/uL 0.573 (H)     Biological Infusion Checklist:  ~~~ NOTE: If the patient answers yes to any of the questions below, hold the infusion and contact ordering provider or on-call provider.    Have you recently had an elevated  temperature, fever, chills, productive cough, coughing for 3 weeks or longer or hemoptysis,  abnormal vital signs, night sweats,  chest pain or have you noticed a decrease in your appetite, unexplained weight loss or fatigue? No  Do you have any open wounds or new incisions? No  Do you have any upcoming hospitalizations or surgeries? Does not include esophagogastroduodenoscopy, colonoscopy, endoscopic retrograde cholangiopancreatography (ERCP), endoscopic ultrasound (EUS), dental procedures or joint aspiration/steroid injections No  Do you currently have any signs of illness or infection or are you on any antibiotics? No  Have you had any new, sudden or worsening abdominal pain? No  Have you or anyone in your household received a live vaccination in the past 4 weeks? Please note: No live vaccines while on biologic/chemotherapy until 6 months after the last treatment. Patient can receive the flu vaccine (shot only), pneumovax and the Covid vaccine. It is optimal for the patient to get these vaccines mid cycle, but they can be given at any time as long as it is not on the day of the infusion. No  Have you recently been diagnosed with any new nervous system diseases (ie. Multiple sclerosis, Guillain Juneau, seizures, neurological changes) or cancer diagnosis? Are you on any form of radiation or chemotherapy? No  Are you pregnant or breast feeding or do you have plans of pregnancy in the future? No  Have there been any other new onset medical symptoms? No  Results reviewed, labs MET treatment parameters, ok to proceed with treatment.    Post Infusion Assessment:  Patient tolerated infusion without incident.  Patient observed for 30 minutes post Jr per protocol.  Blood return noted pre and post infusion.  Site patent and intact, free from redness, edema or discomfort.  No evidence of extravasations.  Access discontinued per protocol.     Discharge Plan:   Prescription refills given for asa, jadenu, hydrea, melatonin and  oxy .  Discharge instructions reviewed with: Patient.  Patient and/or family verbalized understanding of discharge instructions and all questions answered.  AVS to patient via [facility].  Patient will return 3/22 for next appointment.   Departure Mode: Ambulatory.      Nichole Vasquez RN

## 2024-02-23 NOTE — PATIENT INSTRUCTIONS
[facility] Triage and after hours / weekends / holidays:  [phone]    Please call the [facility] Triage line if you experience a temperature greater than or equal to 100.4, shaking chills, have uncontrolled nausea, vomiting and/or diarrhea, dizziness, shortness of breath, chest pain, bleeding, unexplained bruising, or if you have any other new/concerning symptoms, questions or concerns.     If you wish to speak to a provider before your next infusion visit, please call your care coordinator to notify them so we can adequately serve you.    If you need a refill on a narcotic prescription or other medication, please call before your infusion appointment.      EDUCATION POST BIOLOGICAL/CHEMOTHERAPY INFUSION  Call the triage nurse at your clinic or seek medical attention if you have chills and/or temperature greater than or equal to 100.5, uncontrolled nausea/vomiting, diarrhea, constipation, dizziness, shortness of breath, chest pain, heart palpitations, weakness or any other new or concerning symptoms, questions or concerns.  You can not have any live virus vaccines prior to or during treatment or up to 6 months post infusion.  If you have an upcoming surgery, medical procedure or dental procedure during treatment, this should be discussed with your ordering physician and your surgeon/dentist.  If you are having any concerning symptom, if you are unsure if you should get your next infusion or wish to speak to a provider before your next infusion, please call your care coordinator or triage nurse at your clinic to notify them so we can adequately serve you.         February 2024 Sunday Monday Tuesday Wednesday Thursday Friday Saturday                       1    ONC INFUSION 2 HR (120 MIN)  10:30 AM   (120 min.)    ONC INFUSION NURSE   [facility] 2     3    Admission   9:04 PM   Dmitri Thomas MD   [facility] Emergency Department   (Discharge: 2/4/2024)   4     5    BMT  INFUSION 2 HR (120 MIN)   1:00 PM   (120 min.)   UC BMT INFUSION NURSE   [facility] [place]    RETURN CCSL   1:30 PM   (45 min.)   Patricia Mantilla CNP   [facility] 6     7     8    SPEC INFUSION 3 HR (180 MIN)  10:30 AM   (180 min.)   UC SIPC INFUSION NURSE   [facility] 9    RETURN ASTHMA  12:45 PM   (40 min.)   Jake Harvey MD   [facility]    Admission   7:44 PM   Leslie Roper MD   [facility] Emergency Department   (Discharge: 2/10/2024) 10       11     12    SPEC INFUSION 3 HR (180 MIN)   9:30 AM   (180 min.)   UC SIPC INFUSION NURSE   [facility] 13     14     15    INFUSION 4 HR (240 MIN)  11:30 AM   (240 min.)   RH INFUSION CHAIR   [facility] 16    Admission  11:29 PM   Mykel Luz DO   [facility] Emergency Department   (Discharge: 2/17/2024) 17       18     19    SPEC INFUSION 3 HR (180 MIN)   1:00 PM   (180 min.)   UC SIPC INFUSION NURSE   [facility] 20     21    Admission  12:15 PM   [facility] Emergency Department   (Discharge: 2/21/2024) 22     23    LAB CENTRAL  10:00 AM   (15 min.)   [facility] DRAW   [facility]    RETURN ACTIVE TREATMENT  10:15 AM   (45 min.)   Patricia Mantilla CNP   [facility]    ONC INFUSION 4 HR (240 MIN)  11:30 AM   (240 min.)   UC ONC INFUSION NURSE   [facility] 24       25     26     27    ADULT HEARING EVALUATION   3:30 PM   (60 min.)   Judy Anders AuD   [facility] chele 28     29 March 2024 Sunday Monday Tuesday Wednesday Thursday Friday Saturday                             1     2       3     4  Happy Birthday!     5     6     7     8     9       10     11     12     13     14     15     16       17     18    LAB CENTRAL   1:00 PM   (15 min.)    [facility] DRAW   [facility]    RETURN CCSL   1:15 PM   (30 min.)   Eric Duncan MD   [facility] 19     20     21     22    LAB CENTRAL  11:00 AM   (15 min.)    [facility] DRAW   [facility]    ONC INFUSION 4 HR (240 MIN)  11:30 AM   (240 min.)    ONC INFUSION NURSE   [facility] 23       24     25     26     27     28    NEUROTOXIN   7:45 AM   (60 min.)   Katherine Pierce MD   [facility] 29     30       31                                                  Recent Results (from the past 24 hour(s))   Basic metabolic panel    Collection Time: 02/23/24 10:24 AM   Result Value Ref Range    Sodium 139 135 - 145 mmol/L    Potassium 3.6 3.4 - 5.3 mmol/L    Chloride 106 98 - 107 mmol/L    Carbon Dioxide (CO2) 22 22 - 29 mmol/L    Anion Gap 11 7 - 15 mmol/L    Urea Nitrogen 8.5 6.0 - 20.0 mg/dL    Creatinine 0.49 (L) 0.51 - 0.95 mg/dL    GFR Estimate >90 >60 mL/min/1.73m2    Calcium 9.0 8.6 - 10.0 mg/dL    Glucose 87 70 - 99 mg/dL   Reticulocyte count    Collection Time: 02/23/24 10:24 AM   Result Value Ref Range    % Reticulocyte 24.2 (H) 0.5 - 2.0 %    Absolute Reticulocyte 0.573 (H) 0.025 - 0.095 10e6/uL   CBC with platelets and differential    Collection Time: 02/23/24 10:24 AM   Result Value Ref Range    WBC Count 12.8 (H) 4.0 - 11.0 10e3/uL    RBC Count 2.46 (L) 3.80 - 5.20 10e6/uL    Hemoglobin 7.6 (L) 11.7 - 15.7 g/dL    Hematocrit 21.1 (L) 35.0 - 47.0 %    MCV 86 78 - 100 fL    MCH 30.9 26.5 - 33.0 pg    MCHC 36.0 31.5 - 36.5 g/dL    RDW 24.4 (H) 10.0 - 15.0 %    Platelet Count 456 (H) 150 - 450 10e3/uL    % Neutrophils 63 %    % Lymphocytes 20 %    %  Monocytes 9 %    % Eosinophils 4 %    % Basophils 3 %    % Immature Granulocytes 1 %    NRBCs per 100 WBC 3 (H) <1 /100    Absolute Neutrophils 8.2 1.6 - 8.3 10e3/uL    Absolute Lymphocytes 2.6 0.8 - 5.3 10e3/uL    Absolute Monocytes 1.1 0.0 - 1.3 10e3/uL    Absolute Eosinophils 0.5 0.0 - 0.7 10e3/uL    Absolute Basophils 0.3 (H) 0.0 - 0.2 10e3/uL    Absolute Immature Granulocytes 0.1 <=0.4 10e3/uL    Absolute NRBCs 0.4 10e3/uL

## 2024-02-23 NOTE — NURSING NOTE
"Oncology Rooming Note    February 23, 2024 10:29 AM   Jennifer Cervantes is a 23 year old female who presents for:    Chief Complaint   Patient presents with    Oncology Clinic Visit     Sickle cell anemia     Initial Vitals: /78 (BP Location: Right arm, Patient Position: Sitting, Cuff Size: Adult Regular)   Pulse 82   Temp 98.1  F (36.7  C) (Oral)   Resp 16   SpO2 96%  Estimated body mass index is 25.4 kg/m  as calculated from the following:    Height as of 2/3/24: 1.626 m (5' 4\").    Weight as of 2/3/24: 67.1 kg (148 lb). There is no height or weight on file to calculate BSA.  Extreme Pain (8) Comment: Data Unavailable   No LMP recorded. Patient has had an implant.  Allergies reviewed: Yes  Medications reviewed: Yes    Medications: Medication refills not needed today.  Pharmacy name entered into EPIC: [facility] [place], [place] - [address] [place] [address] [phone]    Frailty Screening:   Is the patient here for a new oncology consult visit in cancer care? 2. No      Clinical concerns: none       Stacie Joseph              "

## 2024-02-26 ENCOUNTER — INFUSION THERAPY VISIT (OUTPATIENT)
Dept: INFUSION THERAPY | Facility: CLINIC | Age: 24
End: 2024-02-26
Attending: PEDIATRICS
Payer: COMMERCIAL

## 2024-02-26 ENCOUNTER — NURSE TRIAGE (OUTPATIENT)
Dept: ONCOLOGY | Facility: CLINIC | Age: 24
End: 2024-02-26
Payer: COMMERCIAL

## 2024-02-26 VITALS
RESPIRATION RATE: 16 BRPM | TEMPERATURE: 98.4 F | SYSTOLIC BLOOD PRESSURE: 123 MMHG | HEART RATE: 63 BPM | DIASTOLIC BLOOD PRESSURE: 84 MMHG | OXYGEN SATURATION: 100 %

## 2024-02-26 DIAGNOSIS — D57.00 SICKLE CELL PAIN CRISIS (H): Primary | ICD-10-CM

## 2024-02-26 DIAGNOSIS — G81.10 SPASTIC HEMIPLEGIA, UNSPECIFIED ETIOLOGY, UNSPECIFIED LATERALITY (H): ICD-10-CM

## 2024-02-26 PROCEDURE — 250N000013 HC RX MED GY IP 250 OP 250 PS 637: Performed by: PEDIATRICS

## 2024-02-26 PROCEDURE — 250N000011 HC RX IP 250 OP 636: Performed by: PEDIATRICS

## 2024-02-26 PROCEDURE — 96376 TX/PRO/DX INJ SAME DRUG ADON: CPT

## 2024-02-26 PROCEDURE — 96374 THER/PROPH/DIAG INJ IV PUSH: CPT

## 2024-02-26 PROCEDURE — 96361 HYDRATE IV INFUSION ADD-ON: CPT

## 2024-02-26 PROCEDURE — 258N000003 HC RX IP 258 OP 636: Performed by: PEDIATRICS

## 2024-02-26 PROCEDURE — 96375 TX/PRO/DX INJ NEW DRUG ADDON: CPT

## 2024-02-26 RX ORDER — HEPARIN SODIUM (PORCINE) LOCK FLUSH IV SOLN 100 UNIT/ML 100 UNIT/ML
5 SOLUTION INTRAVENOUS
Status: DISCONTINUED | OUTPATIENT
Start: 2024-02-26 | End: 2024-02-26 | Stop reason: HOSPADM

## 2024-02-26 RX ORDER — DIPHENHYDRAMINE HCL 25 MG
50 CAPSULE ORAL
Status: CANCELLED
Start: 2024-04-01

## 2024-02-26 RX ORDER — KETOROLAC TROMETHAMINE 30 MG/ML
30 INJECTION, SOLUTION INTRAMUSCULAR; INTRAVENOUS ONCE
Status: COMPLETED | OUTPATIENT
Start: 2024-02-26 | End: 2024-02-26

## 2024-02-26 RX ORDER — HEPARIN SODIUM,PORCINE 10 UNIT/ML
5 VIAL (ML) INTRAVENOUS
Status: CANCELLED | OUTPATIENT
Start: 2024-04-01

## 2024-02-26 RX ORDER — HEPARIN SODIUM (PORCINE) LOCK FLUSH IV SOLN 100 UNIT/ML 100 UNIT/ML
5 SOLUTION INTRAVENOUS
Status: CANCELLED | OUTPATIENT
Start: 2024-04-01

## 2024-02-26 RX ORDER — ONDANSETRON 2 MG/ML
8 INJECTION INTRAMUSCULAR; INTRAVENOUS EVERY 6 HOURS PRN
Status: DISCONTINUED | OUTPATIENT
Start: 2024-02-26 | End: 2024-02-26 | Stop reason: HOSPADM

## 2024-02-26 RX ORDER — ONDANSETRON 2 MG/ML
8 INJECTION INTRAMUSCULAR; INTRAVENOUS EVERY 6 HOURS PRN
Status: CANCELLED
Start: 2024-04-01

## 2024-02-26 RX ORDER — DIPHENHYDRAMINE HCL 25 MG
50 CAPSULE ORAL
Status: COMPLETED | OUTPATIENT
Start: 2024-02-26 | End: 2024-02-26

## 2024-02-26 RX ORDER — ONDANSETRON 4 MG/1
8 TABLET, FILM COATED ORAL
Status: CANCELLED
Start: 2024-04-01

## 2024-02-26 RX ORDER — KETOROLAC TROMETHAMINE 30 MG/ML
30 INJECTION, SOLUTION INTRAMUSCULAR; INTRAVENOUS ONCE
Status: CANCELLED
Start: 2024-04-01 | End: 2024-04-01

## 2024-02-26 RX ADMIN — ONDANSETRON 8 MG: 2 INJECTION INTRAMUSCULAR; INTRAVENOUS at 08:30

## 2024-02-26 RX ADMIN — HYDROMORPHONE HYDROCHLORIDE 1 MG: 1 INJECTION, SOLUTION INTRAMUSCULAR; INTRAVENOUS; SUBCUTANEOUS at 09:39

## 2024-02-26 RX ADMIN — Medication 5 ML: at 10:52

## 2024-02-26 RX ADMIN — KETOROLAC TROMETHAMINE 30 MG: 30 INJECTION, SOLUTION INTRAMUSCULAR; INTRAVENOUS at 08:38

## 2024-02-26 RX ADMIN — HYDROMORPHONE HYDROCHLORIDE 1 MG: 1 INJECTION, SOLUTION INTRAMUSCULAR; INTRAVENOUS; SUBCUTANEOUS at 08:36

## 2024-02-26 RX ADMIN — HYDROMORPHONE HYDROCHLORIDE 1 MG: 1 INJECTION, SOLUTION INTRAMUSCULAR; INTRAVENOUS; SUBCUTANEOUS at 10:32

## 2024-02-26 RX ADMIN — SODIUM CHLORIDE, POTASSIUM CHLORIDE, SODIUM LACTATE AND CALCIUM CHLORIDE 1000 ML: 600; 310; 30; 20 INJECTION, SOLUTION INTRAVENOUS at 08:22

## 2024-02-26 RX ADMIN — DIPHENHYDRAMINE HYDROCHLORIDE 50 MG: 25 CAPSULE ORAL at 08:29

## 2024-02-26 NOTE — TELEPHONE ENCOUNTER
Oncology Nurse Triage - Sickle Cell Pain Crisis:  Situation: Jennifer  calling about Sickle Cell Pain Crisis, requesting to be added on for IV fluids and pain medicine    Background:   Patient's last infusion was 2/23/24   Last clinic visit date:2/2324 with Patricia Mantilla CNP  Does patient have active treatment plan?  Yes    Assessment of Symptoms:  Onset/Duration of symptoms: 1 day    Is it typical sickle cell pain? Yes   Location: L arm  Character: pains shooting up arm, from hand going up arm, pain in fingers           Intensity: 9/10    Any radiation of pain, numbness, tingling, weakness, warmth, swelling, discoloration of arms or legs?No     Fever?No  Chest Pain Present: No   Shortness of breath: No     Other home therapies tried: HEAT/HEATING PAD and WARM BATH   Last home medication taken and when: 0300 oxycodone 10 mg    Any Refills Needed?: No     Does patient have transportation & length of time to get to clinic: No   If 10 or 11, will need ride  If earlier than 10 can get a ride.    Recommendations:   If you do not hear from the infusion center by 2pm then you will not be able to get in for an infusion today. If symptoms worsen while waiting for call back, and/or you experience fever, chills, SOB, chest pain, cough, n/v, dizziness, numbness, swelling, discoloration of extremities, then seek emergency evaluation in Emergency Department.     Pt voiced understanding.    Added to Infusion Wait list.      0705: [facility] can offer Jennifer an apt at 0800  0712: Called Jennifer who confirmed she can be here at 0800 and has a ride.  Message sent to [facility] to schedule.  Message sent to [facility] charge to update her.    Message routed to Care Team.

## 2024-02-26 NOTE — PROGRESS NOTES
Nursing Note  Jennifer Cervantes presents today [facility] for:   Chief Complaint   Patient presents with    Infusion     IVF/pain meds     During today's [facility] appointment, orders from Dr. Duncan were completed.  Frequency:  as needed    Progress note:  Patient identification verified by name and date of birth.  Assessment completed.  Vitals recorded in Doc Flowsheets.  Patient was provided with education regarding medication/procedure and possible side effects.  Patient verbalized understanding.     present during visit today: Not Applicable.    Treatment Conditions: Non-applicable. Dilaudid given hourly x 3. See flowsheet for pain assessment    Infusion length and rate:  infusion given over approximately  2 hours    Labs: were not ordered for this appointment.    Vascular access: port accessed today.    Is the next appt scheduled? yes    Post Infusion Assessment:  Patient tolerated infusion without incident.     Discharge Plan:   Follow up plan of care with: ongoing infusions at infusion centers in [facility].  Discharge instructions were reviewed with patient.  Patient/representative verbalized understanding of discharge instructions and all questions answered.  Patient discharged from [facility] in stable condition.    Patricia Qureshi RN    Administrations This Visit       diphenhydrAMINE (BENADRYL) capsule 50 mg       Admin Date  02/26/2024 Action  $Given Dose  50 mg Route  Oral Documented By  Patricia Qureshi RN              heparin 100 unit/mL injection 5 mL       Admin Date  02/26/2024 Action  $Given Dose  5 mL Route  Intracatheter Documented By  Patricia Qureshi RN              HYDROmorphone (DILAUDID) injection 1 mg       Admin Date  02/26/2024 Action  $Given Dose  1 mg Route  Intravenous Documented By  Patricia Qureshi RN               Admin Date  02/26/2024 Action  $Given Dose  1 mg Route  Intravenous  Documented By  Patricia Qureshi RN               Admin Date  02/26/2024 Action  $Given Dose  1 mg Route  Intravenous Documented By  Patricia Qureshi RN              ketorolac (TORADOL) injection 30 mg       Admin Date  02/26/2024 Action  $Given Dose  30 mg Route  Intravenous Documented By  Patricia Qureshi RN              lactated ringers BOLUS 1,000 mL       Admin Date  02/26/2024 Action  $New Bag Dose  1,000 mL Rate  500 mL/hr Route  Intravenous Documented By  Patricia Qureshi RN              ondansetron (ZOFRAN) injection 8 mg       Admin Date  02/26/2024 Action  $Given Dose  8 mg Route  Intravenous Documented By  Patricia Qureshi RN                    /84 (BP Location: Right arm)   Pulse 63   Temp 98.4  F (36.9  C) (Oral)   Resp 16   SpO2 100%

## 2024-02-27 ENCOUNTER — OFFICE VISIT (OUTPATIENT)
Dept: AUDIOLOGY | Facility: CLINIC | Age: 24
End: 2024-02-27
Attending: REGISTERED NURSE
Payer: COMMERCIAL

## 2024-02-27 DIAGNOSIS — H93.19 TINNITUS: Primary | ICD-10-CM

## 2024-02-27 PROCEDURE — 92550 TYMPANOMETRY & REFLEX THRESH: CPT | Performed by: AUDIOLOGIST

## 2024-02-27 PROCEDURE — 92557 COMPREHENSIVE HEARING TEST: CPT | Performed by: AUDIOLOGIST

## 2024-02-27 PROCEDURE — 92588 EVOKED AUDITORY TST COMPLETE: CPT | Performed by: AUDIOLOGIST

## 2024-02-27 NOTE — Clinical Note
Hello! Thank you for your referral. No change in hearing- No Grade in both ears. I recommend that she return as you recommend. Please let me know if you have any questions.  Thank you!  Abdulaziz Linares, CCC-A Audiologist

## 2024-02-28 ENCOUNTER — NURSE TRIAGE (OUTPATIENT)
Dept: ONCOLOGY | Facility: CLINIC | Age: 24
End: 2024-02-28
Payer: COMMERCIAL

## 2024-02-28 NOTE — TELEPHONE ENCOUNTER
Oncology Nurse Triage - Sickle Cell Pain Crisis:  Situation: Jennifer  calling about Sickle Cell Pain Crisis, requesting to be added on for IV fluids and pain medicine    Background:   Patient's last infusion was 2/26/24  Last clinic visit date:2/23/24 with Patricia Mantilla CNP  Does patient have active treatment plan?  Yes    Assessment of Symptoms:  Onset/Duration of symptoms: 1 day, started last night    Is it typical sickle cell pain? Yes   Location: L side of body  Character: Sharp           Intensity: 8/10    Any radiation of pain, numbness, tingling, weakness, warmth, swelling, discoloration of arms or legs?No     Fever?No  Chest Pain Present: No   Shortness of breath: No     Other home therapies tried: HEAT/HEATING PAD and WARM BATH   Last home medication taken and when: oxycodone at 0500    Any Refills Needed?: No     Does patient have transportation & length of time to get to clinic: Yes   If sooner openings she can try to arrange transportation      Recommendations:   Added to infusion wait list.    If you do not hear from the infusion center by 2pm then you will not be able to get in for an infusion today. If symptoms worsen while waiting for call back, and/or you experience fever, chills, SOB, chest pain, cough, n/v, dizziness, numbness, swelling, discoloration of extremities, then seek emergency evaluation in Emergency Department.   Pt voiced understanding.    Unable to offer apt today d/t capacity.

## 2024-02-29 ENCOUNTER — NURSE TRIAGE (OUTPATIENT)
Dept: ONCOLOGY | Facility: CLINIC | Age: 24
End: 2024-02-29
Payer: COMMERCIAL

## 2024-02-29 DIAGNOSIS — D57.00 SICKLE CELL PAIN CRISIS (H): ICD-10-CM

## 2024-02-29 RX ORDER — OXYCODONE HYDROCHLORIDE 10 MG/1
10 TABLET ORAL EVERY 4 HOURS PRN
Qty: 20 TABLET | Refills: 0 | Status: SHIPPED | OUTPATIENT
Start: 2024-03-01 | End: 2024-03-07

## 2024-02-29 NOTE — TELEPHONE ENCOUNTER
Oncology Nurse Triage - Sickle Cell Pain Crisis:    Situation: Jennifer  calling about Sickle Cell Pain Crisis, requesting to be added on for IV fluids and pain medicine    Background:     Patient's last infusion was 02/26/24  Last clinic visit date:02/23/24 w/Patricia Mantilla  Does patient have active treatment plan?  Yes      Assessment of Symptoms:  Onset/Duration of symptoms: 3 day    Is it typical sickle cell pain? Yes   Location: Whole body  Character: Sharp and burning           Intensity: 9/10    Any radiation of pain, numbness, tingling, weakness, warmth, swelling, discoloration of arms or legs?No     Fever?No  (if yes max temperature recorded in last 24 hours):      Chest Pain Present: No     Shortness of breath: No     Other home therapies tried: HEAT/HEATING PAD and WARM BATH     Last home medication taken and when: oxycodone, ibuprofen, muscle relaxer 0600    Any Refills Needed?: Yes, oxycodone    Does patient have transportation & length of time to get to clinic: Yes, pt states she has transportation if something opens up within the next hour. Would take 20 min to get to clinic. Otherwise, will need help transportation.        Recommendations:   Added to infusion wait list for IVF/pain meds per protocol.      If you do not hear from the infusion center by 2pm then you will not be able to get in for an infusion today. If symptoms worsen while waiting for call back, and/or you experience fever, chills, SOB, chest pain, cough, n/v, dizziness, numbness, swelling, discoloration of extremities, then seek emergency evaluation in Emergency Department.

## 2024-02-29 NOTE — TELEPHONE ENCOUNTER
Narcotic Refill Request    Medication(s) requested:  Oxycodone  Person Requesting Refill: Patient  What pain is the medication treating: Sickle cell pain  How is the medication being taken?: 1 tablet every 6 hrs  Does pt have enough for today? Will be out tomorrow  Is pain being adequately controlled on the current regimen?: Yes  Experiencing any side effects from medication?: No    Date of most recent appointment:  02/26/24 w/Patricia Mantilla  Any No Show Visits: No  Next appointment:   03/18/24 w/  Last fill date and by whom:  02/23/24 by Patricia Mantilla   Reviewed: No acces    Routed provider: Patricia Mantilla

## 2024-03-01 ENCOUNTER — HOSPITAL ENCOUNTER (EMERGENCY)
Facility: CLINIC | Age: 24
Discharge: HOME | End: 2024-03-01
Attending: EMERGENCY MEDICINE | Admitting: EMERGENCY MEDICINE
Payer: COMMERCIAL

## 2024-03-01 ENCOUNTER — NURSE TRIAGE (OUTPATIENT)
Dept: ONCOLOGY | Facility: CLINIC | Age: 24
End: 2024-03-01

## 2024-03-01 VITALS
HEIGHT: 64 IN | SYSTOLIC BLOOD PRESSURE: 134 MMHG | BODY MASS INDEX: 25.61 KG/M2 | RESPIRATION RATE: 16 BRPM | HEART RATE: 84 BPM | DIASTOLIC BLOOD PRESSURE: 84 MMHG | TEMPERATURE: 98.5 F | OXYGEN SATURATION: 94 % | WEIGHT: 150 LBS

## 2024-03-01 DIAGNOSIS — D57.00 SICKLE CELL DISEASE WITH CRISIS (H): ICD-10-CM

## 2024-03-01 LAB
ACANTHOCYTES BLD QL SMEAR: ABNORMAL
ALBUMIN SERPL BCG-MCNC: 4.5 G/DL (ref 3.5–5.2)
ALP SERPL-CCNC: 63 U/L (ref 40–150)
ALT SERPL W P-5'-P-CCNC: 25 U/L (ref 0–50)
ANION GAP SERPL CALCULATED.3IONS-SCNC: 12 MMOL/L (ref 7–15)
AST SERPL W P-5'-P-CCNC: 57 U/L (ref 0–45)
AUER BODIES BLD QL SMEAR: ABNORMAL
BASO STIPL BLD QL SMEAR: ABNORMAL
BASOPHILS # BLD AUTO: 0.2 10E3/UL (ref 0–0.2)
BASOPHILS NFR BLD AUTO: 2 %
BILIRUB SERPL-MCNC: 3.6 MG/DL
BITE CELLS BLD QL SMEAR: ABNORMAL
BLISTER CELLS BLD QL SMEAR: ABNORMAL
BUN SERPL-MCNC: 7.3 MG/DL (ref 6–20)
BURR CELLS BLD QL SMEAR: ABNORMAL
CALCIUM SERPL-MCNC: 8.8 MG/DL (ref 8.6–10)
CHLORIDE SERPL-SCNC: 103 MMOL/L (ref 98–107)
CREAT SERPL-MCNC: 0.47 MG/DL (ref 0.51–0.95)
DACRYOCYTES BLD QL SMEAR: ABNORMAL
DEPRECATED HCO3 PLAS-SCNC: 22 MMOL/L (ref 22–29)
EGFRCR SERPLBLD CKD-EPI 2021: >90 ML/MIN/1.73M2
ELLIPTOCYTES BLD QL SMEAR: ABNORMAL
EOSINOPHIL # BLD AUTO: 0.6 10E3/UL (ref 0–0.7)
EOSINOPHIL NFR BLD AUTO: 5 %
ERYTHROCYTE [DISTWIDTH] IN BLOOD BY AUTOMATED COUNT: 25.9 % (ref 10–15)
FRAGMENTS BLD QL SMEAR: ABNORMAL
GLUCOSE SERPL-MCNC: 82 MG/DL (ref 70–99)
HCG SERPL QL: NEGATIVE
HCT VFR BLD AUTO: 22.9 % (ref 35–47)
HGB BLD-MCNC: 7.7 G/DL (ref 11.7–15.7)
HGB C CRYSTALS: ABNORMAL
HOWELL-JOLLY BOD BLD QL SMEAR: ABNORMAL
IMM GRANULOCYTES # BLD: 0.1 10E3/UL
IMM GRANULOCYTES NFR BLD: 1 %
LYMPHOCYTES # BLD AUTO: 2.3 10E3/UL (ref 0.8–5.3)
LYMPHOCYTES NFR BLD AUTO: 19 %
MCH RBC QN AUTO: 31.2 PG (ref 26.5–33)
MCHC RBC AUTO-ENTMCNC: 33.6 G/DL (ref 31.5–36.5)
MCV RBC AUTO: 93 FL (ref 78–100)
MONOCYTES # BLD AUTO: 1.2 10E3/UL (ref 0–1.3)
MONOCYTES NFR BLD AUTO: 10 %
NEUTROPHILS # BLD AUTO: 7.8 10E3/UL (ref 1.6–8.3)
NEUTROPHILS NFR BLD AUTO: 63 %
NEUTS HYPERSEG BLD QL SMEAR: ABNORMAL
NRBC # BLD AUTO: 0.4 10E3/UL
NRBC BLD AUTO-RTO: 3 /100
PLAT MORPH BLD: ABNORMAL
PLATELET # BLD AUTO: 389 10E3/UL (ref 150–450)
POLYCHROMASIA BLD QL SMEAR: SLIGHT
POTASSIUM SERPL-SCNC: 3.6 MMOL/L (ref 3.4–5.3)
PROT SERPL-MCNC: 7.5 G/DL (ref 6.4–8.3)
RBC # BLD AUTO: 2.47 10E6/UL (ref 3.8–5.2)
RBC AGGLUT BLD QL: ABNORMAL
RBC MORPH BLD: ABNORMAL
RETICS # AUTO: 0.72 10E6/UL (ref 0.03–0.1)
RETICS/RBC NFR AUTO: 26.4 % (ref 0.5–2)
ROULEAUX BLD QL SMEAR: ABNORMAL
SICKLE CELLS BLD QL SMEAR: ABNORMAL
SMUDGE CELLS BLD QL SMEAR: ABNORMAL
SODIUM SERPL-SCNC: 137 MMOL/L (ref 135–145)
SPHEROCYTES BLD QL SMEAR: ABNORMAL
STOMATOCYTES BLD QL SMEAR: ABNORMAL
TARGETS BLD QL SMEAR: ABNORMAL
TOXIC GRANULES BLD QL SMEAR: ABNORMAL
VARIANT LYMPHS BLD QL SMEAR: ABNORMAL
WBC # BLD AUTO: 12.2 10E3/UL (ref 4–11)

## 2024-03-01 PROCEDURE — 99284 EMERGENCY DEPT VISIT MOD MDM: CPT | Mod: 25 | Performed by: EMERGENCY MEDICINE

## 2024-03-01 PROCEDURE — 250N000011 HC RX IP 250 OP 636: Performed by: PHYSICIAN ASSISTANT

## 2024-03-01 PROCEDURE — 99285 EMERGENCY DEPT VISIT HI MDM: CPT | Mod: FS | Performed by: EMERGENCY MEDICINE

## 2024-03-01 PROCEDURE — 96376 TX/PRO/DX INJ SAME DRUG ADON: CPT | Performed by: EMERGENCY MEDICINE

## 2024-03-01 PROCEDURE — 96374 THER/PROPH/DIAG INJ IV PUSH: CPT | Performed by: EMERGENCY MEDICINE

## 2024-03-01 PROCEDURE — 85025 COMPLETE CBC W/AUTO DIFF WBC: CPT | Performed by: PHYSICIAN ASSISTANT

## 2024-03-01 PROCEDURE — 80053 COMPREHEN METABOLIC PANEL: CPT | Performed by: PHYSICIAN ASSISTANT

## 2024-03-01 PROCEDURE — 96375 TX/PRO/DX INJ NEW DRUG ADDON: CPT | Performed by: EMERGENCY MEDICINE

## 2024-03-01 PROCEDURE — 96361 HYDRATE IV INFUSION ADD-ON: CPT | Performed by: EMERGENCY MEDICINE

## 2024-03-01 PROCEDURE — 258N000003 HC RX IP 258 OP 636: Performed by: PHYSICIAN ASSISTANT

## 2024-03-01 PROCEDURE — 250N000011 HC RX IP 250 OP 636: Performed by: EMERGENCY MEDICINE

## 2024-03-01 PROCEDURE — 36415 COLL VENOUS BLD VENIPUNCTURE: CPT | Performed by: PHYSICIAN ASSISTANT

## 2024-03-01 PROCEDURE — 85045 AUTOMATED RETICULOCYTE COUNT: CPT | Performed by: PHYSICIAN ASSISTANT

## 2024-03-01 PROCEDURE — 84703 CHORIONIC GONADOTROPIN ASSAY: CPT | Performed by: PHYSICIAN ASSISTANT

## 2024-03-01 RX ORDER — KETOROLAC TROMETHAMINE 30 MG/ML
30 INJECTION, SOLUTION INTRAMUSCULAR; INTRAVENOUS ONCE
Status: COMPLETED | OUTPATIENT
Start: 2024-03-01 | End: 2024-03-01

## 2024-03-01 RX ORDER — HEPARIN SODIUM (PORCINE) LOCK FLUSH IV SOLN 100 UNIT/ML 100 UNIT/ML
100 SOLUTION INTRAVENOUS ONCE
Status: COMPLETED | OUTPATIENT
Start: 2024-03-01 | End: 2024-03-01

## 2024-03-01 RX ORDER — ONDANSETRON 2 MG/ML
4 INJECTION INTRAMUSCULAR; INTRAVENOUS ONCE
Status: COMPLETED | OUTPATIENT
Start: 2024-03-01 | End: 2024-03-01

## 2024-03-01 RX ADMIN — HYDROMORPHONE HYDROCHLORIDE 1 MG: 1 INJECTION, SOLUTION INTRAMUSCULAR; INTRAVENOUS; SUBCUTANEOUS at 16:37

## 2024-03-01 RX ADMIN — HYDROMORPHONE HYDROCHLORIDE 1 MG: 1 INJECTION, SOLUTION INTRAMUSCULAR; INTRAVENOUS; SUBCUTANEOUS at 14:22

## 2024-03-01 RX ADMIN — HEPARIN 100 UNITS: 100 SYRINGE at 17:44

## 2024-03-01 RX ADMIN — SODIUM CHLORIDE, POTASSIUM CHLORIDE, SODIUM LACTATE AND CALCIUM CHLORIDE 1000 ML: 600; 310; 30; 20 INJECTION, SOLUTION INTRAVENOUS at 14:23

## 2024-03-01 RX ADMIN — KETOROLAC TROMETHAMINE 30 MG: 30 INJECTION, SOLUTION INTRAMUSCULAR; INTRAVENOUS at 14:22

## 2024-03-01 RX ADMIN — HYDROMORPHONE HYDROCHLORIDE 1 MG: 1 INJECTION, SOLUTION INTRAMUSCULAR; INTRAVENOUS; SUBCUTANEOUS at 15:38

## 2024-03-01 RX ADMIN — ONDANSETRON 4 MG: 2 INJECTION INTRAMUSCULAR; INTRAVENOUS at 14:22

## 2024-03-01 ASSESSMENT — ACTIVITIES OF DAILY LIVING (ADL)
ADLS_ACUITY_SCORE: 37
ADLS_ACUITY_SCORE: 35
ADLS_ACUITY_SCORE: 37
ADLS_ACUITY_SCORE: 37

## 2024-03-01 ASSESSMENT — COLUMBIA-SUICIDE SEVERITY RATING SCALE - C-SSRS
6. HAVE YOU EVER DONE ANYTHING, STARTED TO DO ANYTHING, OR PREPARED TO DO ANYTHING TO END YOUR LIFE?: NO
1. IN THE PAST MONTH, HAVE YOU WISHED YOU WERE DEAD OR WISHED YOU COULD GO TO SLEEP AND NOT WAKE UP?: NO
2. HAVE YOU ACTUALLY HAD ANY THOUGHTS OF KILLING YOURSELF IN THE PAST MONTH?: NO

## 2024-03-01 NOTE — ED PROVIDER NOTES
[facility] EMERGENCY DEPARTMENT ([facility])  3/01/24    History     Chief Complaint   Patient presents with    Sickle Cell Pain Crisis     Generalized severe pain since Tuesday, was unable to get into the infusion center so was advised to come here      HPI  Jennifer Cervantes is a 23 year old female who has a PMH significant for sickle cell disease with recurrent pain crises, right-sided hemiplegia and hemiparesis associate with prior cerebral infarction, history of PE on anticoagulation who presents to the emergency department with concerns for sickle cell pain crisis.    Patient presents alone.  She states that she went in for an infusion through her hematology clinic on Monday, starting Wednesday had recurrence of her sickle cell pain.  She has been trying to get in to clinic for infusions, states that she was told that they were full and she was unable to get in.  She is experiencing pain throughout the entirety of her body, which per patient is similar to prior sickle cell flares.  She is not having any new symptoms.  She denies any associated fevers, cough dyspnea chest pain new abdominal pain from her baseline flares.  No vomiting diarrhea urinary symptoms.    Patient did fill her oxycodone prescription this week, has not yet started taking this.  She is hoping to follow-up with her oncology team next week.    Past Medical History  Past Medical History:   Diagnosis Date    Anxiety     Bleeding disorder (H24)     Blood clotting disorder (H24)     Cerebral infarction (H) 2015    Cognitive developmental delay     low IQ. 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 side (H)     right hand contractures    Iron overload due to repeated red blood cell transfusions     Migraines     Multiple subsegmental pulmonary emboli without acute cor pulmonale (H) 02/01/2021    Oppositional defiant  behavior     Presence of intrauterine contraceptive device 2/18/2020    Superficial venous thrombosis of arm, right 03/25/2021    Uncomplicated asthma      Past Surgical History:   Procedure Laterality Date    AS INSERT TUNNELED CV 2 CATH W/O PORT/PUMP      CHOLECYSTECTOMY      CV RIGHT HEART CATH MEASUREMENTS RECORDED N/A 11/18/2021    Procedure: Right Heart Cath;  Surgeon: Jackson Stauffer MD;  Location:  HEART CARDIAC CATH LAB    INSERT PORT VASCULAR ACCESS Left 4/21/2021    Procedure: INSERTION, VASCULAR ACCESS PORT (NOT SURE ON SIDE UNTIL REMOVAL);  Surgeon: Rajan More MD;  Location: [facility] OR    IR CHEST PORT PLACEMENT > 5 YRS OF AGE  4/21/2021    IR CVC NON TUNNEL LINE REMOVAL  5/6/2021    IR CVC NON TUNNEL PLACEMENT > 5 YRS  04/07/2020    IR CVC NON TUNNEL PLACEMENT > 5 YRS  4/30/2021    IR CVC NON TUNNEL PLACEMENT > 5 YRS  9/7/2022    IR PORT REMOVAL LEFT  4/21/2021    REMOVE PORT VASCULAR ACCESS Left 4/21/2021    Procedure: REMOVAL, VASCULAR ACCESS PORT LEFT;  Surgeon: Rajan More MD;  Location: [facility] OR    REPAIR TENDON ELBOW Right 10/02/2019    Procedure: Right Elbow Flexor Lengthening, Flexor Pronator Slide Of Wrist and Finger, Thumb Adductor Lengthening;  Surgeon: Anai Franco MD;  Location: [facility] OR    TONSILLECTOMY Bilateral 10/02/2019    Procedure: Bilateral Tonsillectomy;  Surgeon: Farhana Guy MD;  Location: [facility] OR    ZZC BREAST REDUCTION (INCLUDES LIPO) TIER 3 Bilateral 04/23/2019     acetaminophen (TYLENOL) 325 MG tablet  albuterol (PROAIR HFA/PROVENTIL HFA/VENTOLIN HFA) 108 (90 Base) MCG/ACT inhaler  albuterol (PROVENTIL) (2.5 MG/3ML) 0.083% neb solution  aspirin (ASA) 81 MG chewable tablet  budesonide-formoterol (SYMBICORT) 160-4.5 MCG/ACT Inhaler  budesonide-formoterol (SYMBICORT) 160-4.5 MCG/ACT Inhaler  cetirizine (ZYRTEC) 10 MG tablet  deferasirox (JADENU) 360 MG tablet  diphenhydrAMINE (BENADRYL) 25 MG capsule  EPINEPHrine (ANY BX GENERIC EQUIV) 0.3 MG/0.3ML  "injection 2-pack  Hydroxyurea 1000 MG TABS  melatonin 5 MG tablet  methocarbamol (ROBAXIN) 750 MG tablet  naloxone (NARCAN) 4 MG/0.1ML nasal spray  omeprazole (PRILOSEC) 20 MG DR capsule  ondansetron (ZOFRAN) 8 MG tablet  oxyCODONE IR (ROXICODONE) 10 MG tablet      Allergies   Allergen Reactions    Contrast Dye      Hives and breathing issues    Fish-Derived Products Hives    Seafood Hives    Adhesive Tape Hives     Primipore dressing causes hives    Gadolinium     Iodinated Contrast Media      Family History  Family History   Problem Relation Age of Onset    Sickle Cell Trait Mother     Hypertension Mother     Asthma Mother     Sickle Cell Trait Father     Glaucoma No family hx of     Macular Degeneration No family hx of     Diabetes No family hx of     Gout No family hx of      Social History   Social History     Tobacco Use    Smoking status: Never     Passive exposure: Never    Smokeless tobacco: Never   Substance Use Topics    Alcohol use: Not Currently     Alcohol/week: 0.0 standard drinks of alcohol    Drug use: Never         A medically appropriate review of systems was performed with pertinent positives and negatives noted in the HPI, and all other systems negative.    Physical Exam   BP: 134/84  Pulse: 84  Temp: 98.5  F (36.9  C)  Resp: 16  Height: 162.6 cm (5' 4\")  Weight: 68 kg (150 lb)  SpO2: 94 %  Physical Exam  GENERAL APPEARANCE: The patient is well developed, well appearing, and in no acute distress.  HEAD:  Normocephalic and atraumatic.   EENT: Voice normal.  NECK: Trachea is midline.  LUNGS: Breath sounds are equal and clear bilaterally. No wheezes, rhonchi, or rales.  HEART: Regular rate and normal rhythm.    ABDOMEN: Soft, flat, and benign. No mass, tenderness, guarding, or rebound.Bowel sounds are present.  EXTREMITIES: No cyanosis, clubbing, or edema.  NEUROLOGIC: No focal sensory or motor deficits are noted.  PSYCHIATRIC: The patient is awake, alert.  Appropriate mood and affect.  SKIN: Warm, " dry, and well perfused. Good turgor.    ED Course, Procedures, & Data           Results for orders placed or performed during the hospital encounter of 03/01/24   Reticulocyte count     Status: Abnormal   Result Value Ref Range    % Reticulocyte 26.4 (H) 0.5 - 2.0 %    Absolute Reticulocyte 0.725 (H) 0.025 - 0.095 10e6/uL   HCG qualitative pregnancy (blood)     Status: Normal   Result Value Ref Range    hCG Serum Qualitative Negative Negative   Comprehensive metabolic panel     Status: Abnormal   Result Value Ref Range    Sodium 137 135 - 145 mmol/L    Potassium 3.6 3.4 - 5.3 mmol/L    Carbon Dioxide (CO2) 22 22 - 29 mmol/L    Anion Gap 12 7 - 15 mmol/L    Urea Nitrogen 7.3 6.0 - 20.0 mg/dL    Creatinine 0.47 (L) 0.51 - 0.95 mg/dL    GFR Estimate >90 >60 mL/min/1.73m2    Calcium 8.8 8.6 - 10.0 mg/dL    Chloride 103 98 - 107 mmol/L    Glucose 82 70 - 99 mg/dL    Alkaline Phosphatase 63 40 - 150 U/L    AST 57 (H) 0 - 45 U/L    ALT 25 0 - 50 U/L    Protein Total 7.5 6.4 - 8.3 g/dL    Albumin 4.5 3.5 - 5.2 g/dL    Bilirubin Total 3.6 (H) <=1.2 mg/dL   CBC with platelets and differential     Status: Abnormal   Result Value Ref Range    WBC Count 12.2 (H) 4.0 - 11.0 10e3/uL    RBC Count 2.47 (L) 3.80 - 5.20 10e6/uL    Hemoglobin 7.7 (L) 11.7 - 15.7 g/dL    Hematocrit 22.9 (L) 35.0 - 47.0 %    MCV 93 78 - 100 fL    MCH 31.2 26.5 - 33.0 pg    MCHC 33.6 31.5 - 36.5 g/dL    RDW 25.9 (H) 10.0 - 15.0 %    Platelet Count 389 150 - 450 10e3/uL    % Neutrophils 63 %    % Lymphocytes 19 %    % Monocytes 10 %    % Eosinophils 5 %    % Basophils 2 %    % Immature Granulocytes 1 %    NRBCs per 100 WBC 3 (H) <1 /100    Absolute Neutrophils 7.8 1.6 - 8.3 10e3/uL    Absolute Lymphocytes 2.3 0.8 - 5.3 10e3/uL    Absolute Monocytes 1.2 0.0 - 1.3 10e3/uL    Absolute Eosinophils 0.6 0.0 - 0.7 10e3/uL    Absolute Basophils 0.2 0.0 - 0.2 10e3/uL    Absolute Immature Granulocytes 0.1 <=0.4 10e3/uL    Absolute NRBCs 0.4 10e3/uL   RBC and  Platelet Morphology     Status: Abnormal   Result Value Ref Range    Platelet Assessment  Automated Count Confirmed. Platelet morphology is normal.     Automated Count Confirmed. Platelet morphology is normal.    Acanthocytes      Larry Rods      Basophilic Stippling      Bite Cells      Blister Cells      [place] Cells      Elliptocytes      Hgb C Crystals      White-Jolly Bodies      Hypersegmented Neutrophils      Polychromasia Slight (A) None Seen    RBC agglutination      RBC Fragments      Reactive Lymphocytes      Rouleaux      Sickle Cells Moderate (A) None Seen    Smudge Cells      Spherocytes      Stomatocytes      Target Cells      Teardrop Cells      Toxic Neutrophils      RBC Morphology Confirmed RBC Indices    CBC with platelets differential     Status: Abnormal    Narrative    The following orders were created for panel order CBC with platelets differential.  Procedure                               Abnormality         Status                     ---------                               -----------         ------                     CBC with platelets and d...[[id number]]  Abnormal            Final result               RBC and Platelet Morphology[[id number]]  Abnormal            Final result                 Please view results for these tests on the individual orders.     Medications   HYDROmorphone (DILAUDID) injection 1 mg (1 mg Intravenous $Given 3/1/24 1637)   ketorolac (TORADOL) injection 30 mg (30 mg Intravenous $Given 3/1/24 1422)   lactated ringers BOLUS 1,000 mL (0 mLs Intravenous Stopped 3/1/24 1559)   ondansetron (ZOFRAN) injection 4 mg (4 mg Intravenous $Given 3/1/24 1422)   heparin 100 unit/mL injection 100 Units (100 Units Intravenous $Given 3/1/24 1744)     Labs Ordered and Resulted from Time of ED Arrival to Time of ED Departure   RETICULOCYTE COUNT - Abnormal       Result Value    % Reticulocyte 26.4 (*)     Absolute Reticulocyte 0.725 (*)    COMPREHENSIVE METABOLIC PANEL - Abnormal    Sodium  137      Potassium 3.6      Carbon Dioxide (CO2) 22      Anion Gap 12      Urea Nitrogen 7.3      Creatinine 0.47 (*)     GFR Estimate >90      Calcium 8.8      Chloride 103      Glucose 82      Alkaline Phosphatase 63      AST 57 (*)     ALT 25      Protein Total 7.5      Albumin 4.5      Bilirubin Total 3.6 (*)    CBC WITH PLATELETS AND DIFFERENTIAL - Abnormal    WBC Count 12.2 (*)     RBC Count 2.47 (*)     Hemoglobin 7.7 (*)     Hematocrit 22.9 (*)     MCV 93      MCH 31.2      MCHC 33.6      RDW 25.9 (*)     Platelet Count 389      % Neutrophils 63      % Lymphocytes 19      % Monocytes 10      % Eosinophils 5      % Basophils 2      % Immature Granulocytes 1      NRBCs per 100 WBC 3 (*)     Absolute Neutrophils 7.8      Absolute Lymphocytes 2.3      Absolute Monocytes 1.2      Absolute Eosinophils 0.6      Absolute Basophils 0.2      Absolute Immature Granulocytes 0.1      Absolute NRBCs 0.4     RBC AND PLATELET MORPHOLOGY - Abnormal    Platelet Assessment        Value: Automated Count Confirmed. Platelet morphology is normal.    Acanthocytes        Larry Rods        Basophilic Stippling        Bite Cells        Blister Cells        Juvencio Cells        Elliptocytes        Hgb C Crystals        White-Jolly Bodies        Hypersegmented Neutrophils        Polychromasia Slight (*)     RBC agglutination        RBC Fragments        Reactive Lymphocytes        Rouleaux        Sickle Cells Moderate (*)     Smudge Cells        Spherocytes        Stomatocytes        Target Cells        Teardrop Cells        Toxic Neutrophils        RBC Morphology Confirmed RBC Indices     HCG QUALITATIVE PREGNANCY - Normal    hCG Serum Qualitative Negative       No orders to display              Assessment & Plan    This is a 23-year-old female with history of sickle cell disease presenting with concerns for sickle cell pain crisis per patient this is similar to prior crises.  Presentation here she is afebrile she is normoxic she is not  tachycardic.  She has clear lungs soft abdomen.  Patient states her symptoms are similar to prior flares, will initiate screening labs CBC chemistry reticulocyte count.  Patient be initiated on her care plan for pain control consisting of Dilaudid LR Zofran Toradol.        I have reviewed the nursing notes. I have reviewed the findings, diagnosis, plan and need for follow up with the patient.    Discharge Medication List as of 3/1/2024  5:26 PM          Final diagnoses:   Sickle cell disease with crisis (H)       Swati Donnell, PAC  [facility] EMERGENCY DEPARTMENT  3/1/2024    ----------    --    ED Attending Physician Attestation    I Venancio Nava MD, cared for this patient with the Advanced Practice Provider (ANDREAS). I have performed a history and physical examination of the patient independent of the ANDREAS. I reviewed the ANDREAS's documentation above and agree with the documented findings and plan of care. I personally provided a substantive portion of the care for this patient, including the complete Medical Decision Making. Please see the ANDREAS's documentation for full details.    Summary of HPI, PE, ED Course   Patient is a 23 year old female evaluated in the emergency department for sickle cell pain.  Pain reported all over body but worse in left shoulder.  Denies chest pain cough fever    Exam and ED course notable for after IV Dilaudid given, patient in less pain.  Breathing comfortably.     After the completion of care in the emergency department, the patient was discharged.            Medical Decision Making  The patient's presentation was of high complexity (an acute health issue posing potential threat to life or bodily function).    The patient's evaluation involved:  ordering and/or review of 3+ test(s) in this encounter (see separate area of note for details)    The patient's management necessitated high risk (a parenteral controlled substance).    Assessment and plan  Sickle cell pain crisis  requiring IV Dilaudid, fluids, labs, pregnancy test    530p Reevaluation   prior to discharge, patient has received 3 doses of IV Dilaudid per her care plan which was reviewed in epic  Patient felt as if her pain was treated appropriately and is taking a taxi home and not driving.  Patient will follow-up with her hematologist      Venancio Nava MD  Emergency Medicine        Venancio Nava MD  03/01/24 1951

## 2024-03-01 NOTE — ED NOTES
Discharge plan discussed with pt, pt verbalized understanding. Port flushed with heparin and de accessed.  Pt called medical ride for transport home.

## 2024-03-01 NOTE — DISCHARGE INSTRUCTIONS
Here in the emergency room you have reassuring evaluation.  He had improvement of symptoms with medications from your care plan.  I recommend following up closely with your hematology team next week.    If you develop any new or worsening symptoms, is important to return right away to the emergency department for further evaluation and management.

## 2024-03-01 NOTE — TELEPHONE ENCOUNTER
"Oncology Nurse Triage - Sickle Cell Pain Crisis:    Situation: Jennifer  calling about Sickle Cell Pain Crisis, requesting to be added on for IV fluids and pain medicine    Background:     Patient's last infusion was 02/26/24  Last clinic visit date:02/23/24 w/Patricia Mantilla  Does patient have active treatment plan?  Yes      Assessment of Symptoms:  Onset/Duration of symptoms: 3 day    Is it typical sickle cell pain? Yes   Location: \"all over\"  Character: Sharp           Intensity: 10/10    Any radiation of pain, numbness, tingling, weakness, warmth, swelling, discoloration of arms or legs?No     Fever?No  (if yes max temperature recorded in last 24 hours):      Chest Pain Present: No     Shortness of breath: No     Other home therapies tried: HEAT/HEATING PAD and WARM BATH     Last home medication taken and when: ibuprofen and muscle relaxer     Any Refills Needed?: No     Does patient have transportation & length of time to get to clinic: Yes, if something opens up unexpectedly and she needs to be in clinic within 20 min; However, if there is more time she would like assistance with transportation.         Recommendations:     Added to infusion wait list for IVF/pain meds per protocol.      If you do not hear from the infusion center by 2pm then you will not be able to get in for an infusion today. If symptoms worsen while waiting for call back, and/or you experience fever, chills, SOB, chest pain, cough, n/v, dizziness, numbness, swelling, discoloration of extremities, then seek emergency evaluation in Emergency Department.           "

## 2024-03-01 NOTE — ED TRIAGE NOTES
Triage Assessment (Adult)       Row Name 03/01/24 1231          Triage Assessment    Airway WDL WDL        Respiratory WDL    Respiratory WDL WDL        Skin Circulation/Temperature WDL    Skin Circulation/Temperature WDL WDL        Cardiac WDL    Cardiac WDL WDL        Peripheral/Neurovascular WDL    Peripheral Neurovascular WDL WDL        Cognitive/Neuro/Behavioral WDL    Cognitive/Neuro/Behavioral WDL WDL

## 2024-03-01 NOTE — ED TRIAGE NOTES
Patient had an infusion on Monday and then has had pain since Tuesday. Patient was unable to get into infusion again, and the infusion people told her to go to the ER instead since they could not get her in. Patient has a history of sickle cell, developmental delay, blood clots, and stroke. Patient reports generalized severe pain.

## 2024-03-04 ENCOUNTER — PATIENT OUTREACH (OUTPATIENT)
Dept: CARE COORDINATION | Facility: CLINIC | Age: 24
End: 2024-03-04
Payer: COMMERCIAL

## 2024-03-04 ENCOUNTER — INFUSION THERAPY VISIT (OUTPATIENT)
Dept: INFUSION THERAPY | Facility: CLINIC | Age: 24
End: 2024-03-04
Attending: PEDIATRICS
Payer: COMMERCIAL

## 2024-03-04 ENCOUNTER — NURSE TRIAGE (OUTPATIENT)
Dept: ONCOLOGY | Facility: CLINIC | Age: 24
End: 2024-03-04
Payer: COMMERCIAL

## 2024-03-04 VITALS
SYSTOLIC BLOOD PRESSURE: 129 MMHG | DIASTOLIC BLOOD PRESSURE: 78 MMHG | OXYGEN SATURATION: 98 % | TEMPERATURE: 97.7 F | HEART RATE: 94 BPM | RESPIRATION RATE: 19 BRPM

## 2024-03-04 DIAGNOSIS — G81.10 SPASTIC HEMIPLEGIA, UNSPECIFIED ETIOLOGY, UNSPECIFIED LATERALITY (H): ICD-10-CM

## 2024-03-04 DIAGNOSIS — D57.00 SICKLE CELL PAIN CRISIS (H): Primary | ICD-10-CM

## 2024-03-04 PROCEDURE — 258N000003 HC RX IP 258 OP 636: Performed by: PEDIATRICS

## 2024-03-04 PROCEDURE — 250N000013 HC RX MED GY IP 250 OP 250 PS 637: Performed by: PEDIATRICS

## 2024-03-04 PROCEDURE — 96361 HYDRATE IV INFUSION ADD-ON: CPT

## 2024-03-04 PROCEDURE — 96374 THER/PROPH/DIAG INJ IV PUSH: CPT

## 2024-03-04 PROCEDURE — 250N000011 HC RX IP 250 OP 636: Performed by: PEDIATRICS

## 2024-03-04 PROCEDURE — 96376 TX/PRO/DX INJ SAME DRUG ADON: CPT

## 2024-03-04 PROCEDURE — 96375 TX/PRO/DX INJ NEW DRUG ADDON: CPT

## 2024-03-04 RX ORDER — ONDANSETRON 2 MG/ML
8 INJECTION INTRAMUSCULAR; INTRAVENOUS EVERY 6 HOURS PRN
Status: DISCONTINUED | OUTPATIENT
Start: 2024-03-04 | End: 2024-03-04 | Stop reason: HOSPADM

## 2024-03-04 RX ORDER — HEPARIN SODIUM (PORCINE) LOCK FLUSH IV SOLN 100 UNIT/ML 100 UNIT/ML
5 SOLUTION INTRAVENOUS
Status: CANCELLED | OUTPATIENT
Start: 2024-04-01

## 2024-03-04 RX ORDER — DIPHENHYDRAMINE HCL 25 MG
50 CAPSULE ORAL
Status: CANCELLED
Start: 2024-04-01

## 2024-03-04 RX ORDER — KETOROLAC TROMETHAMINE 30 MG/ML
30 INJECTION, SOLUTION INTRAMUSCULAR; INTRAVENOUS ONCE
Status: CANCELLED
Start: 2024-04-01 | End: 2024-04-01

## 2024-03-04 RX ORDER — DIPHENHYDRAMINE HCL 25 MG
50 CAPSULE ORAL
Status: COMPLETED | OUTPATIENT
Start: 2024-03-04 | End: 2024-03-04

## 2024-03-04 RX ORDER — ONDANSETRON 4 MG/1
8 TABLET, FILM COATED ORAL
Status: CANCELLED
Start: 2024-04-01

## 2024-03-04 RX ORDER — KETOROLAC TROMETHAMINE 30 MG/ML
30 INJECTION, SOLUTION INTRAMUSCULAR; INTRAVENOUS ONCE
Status: COMPLETED | OUTPATIENT
Start: 2024-03-04 | End: 2024-03-04

## 2024-03-04 RX ORDER — HEPARIN SODIUM (PORCINE) LOCK FLUSH IV SOLN 100 UNIT/ML 100 UNIT/ML
5 SOLUTION INTRAVENOUS
Status: DISCONTINUED | OUTPATIENT
Start: 2024-03-04 | End: 2024-03-04 | Stop reason: HOSPADM

## 2024-03-04 RX ORDER — HEPARIN SODIUM,PORCINE 10 UNIT/ML
5 VIAL (ML) INTRAVENOUS
Status: CANCELLED | OUTPATIENT
Start: 2024-04-01

## 2024-03-04 RX ORDER — ONDANSETRON 2 MG/ML
8 INJECTION INTRAMUSCULAR; INTRAVENOUS EVERY 6 HOURS PRN
Status: CANCELLED
Start: 2024-04-01

## 2024-03-04 RX ADMIN — Medication 5 ML: at 11:50

## 2024-03-04 RX ADMIN — HYDROMORPHONE HYDROCHLORIDE 1 MG: 1 INJECTION, SOLUTION INTRAMUSCULAR; INTRAVENOUS; SUBCUTANEOUS at 10:31

## 2024-03-04 RX ADMIN — HYDROMORPHONE HYDROCHLORIDE 1 MG: 1 INJECTION, SOLUTION INTRAMUSCULAR; INTRAVENOUS; SUBCUTANEOUS at 11:39

## 2024-03-04 RX ADMIN — HYDROMORPHONE HYDROCHLORIDE 1 MG: 1 INJECTION, SOLUTION INTRAMUSCULAR; INTRAVENOUS; SUBCUTANEOUS at 09:36

## 2024-03-04 RX ADMIN — DIPHENHYDRAMINE HYDROCHLORIDE 50 MG: 25 CAPSULE ORAL at 09:37

## 2024-03-04 RX ADMIN — KETOROLAC TROMETHAMINE 30 MG: 30 INJECTION, SOLUTION INTRAMUSCULAR; INTRAVENOUS at 09:34

## 2024-03-04 RX ADMIN — SODIUM CHLORIDE, POTASSIUM CHLORIDE, SODIUM LACTATE AND CALCIUM CHLORIDE 1000 ML: 600; 310; 30; 20 INJECTION, SOLUTION INTRAVENOUS at 09:45

## 2024-03-04 RX ADMIN — ONDANSETRON 8 MG: 2 INJECTION INTRAMUSCULAR; INTRAVENOUS at 09:35

## 2024-03-04 ASSESSMENT — PAIN SCALES - GENERAL: PAINLEVEL: EXTREME PAIN (9)

## 2024-03-04 NOTE — TELEPHONE ENCOUNTER
[facility]   Call ghulamoackaye to Jennifer and she can make it to the 9am appt.   Message sent to [employer] for ride home   Message sent to [facility] to schedule

## 2024-03-04 NOTE — PATIENT INSTRUCTIONS
Dear Jennifer Cervantes    Thank you for choosing [facility] ([facility]) for your infusion.  The following information is a summary of our appointment as well as important reminders.      We look forward in seeing you on your next appointment here at [facility] ([facility]  Please don t hesitate to call us at [phone] to reschedule any of your appointments or to speak with one of the [facility] registered nurses.  It was a pleasure taking care of you today.    Sincerely,    [facility]  [address] [place] [address]  [id number]  Phone:  [phone]

## 2024-03-04 NOTE — PROGRESS NOTES
Infusion Nursing Note:  Jennifer Cervantes presents today for IVF/pain.    Patient seen by provider today: No   present during visit today: Not Applicable.    Note:   1L of LR infused over 2 hours  3x dilaudid given every hour  Administrations This Visit       diphenhydrAMINE (BENADRYL) capsule 50 mg       Admin Date  03/04/2024 Action  $Given Dose  50 mg Route  Oral Documented By  Bhupendra Nieves RN              heparin 100 unit/mL injection 5 mL       Admin Date  03/04/2024 Action  $Given Dose  5 mL Route  Intracatheter Documented By  Bhupendra Nieves RN              HYDROmorphone (DILAUDID) injection 1 mg       Admin Date  03/04/2024 Action  $Given Dose  1 mg Route  Intravenous Documented By  Bhupendra Nieves RN               Admin Date  03/04/2024 Action  $Given Dose  1 mg Route  Intravenous Documented By  Bhupendra Nieves RN               Admin Date  03/04/2024 Action  $Given Dose  1 mg Route  Intravenous Documented By  Bhupendra Nieves RN              ketorolac (TORADOL) injection 30 mg       Admin Date  03/04/2024 Action  $Given Dose  30 mg Route  Intravenous Documented By  Bhupendra Nieves RN              lactated ringers BOLUS 1,000 mL       Admin Date  03/04/2024 Action  $New Bag Dose  1,000 mL Rate  500 mL/hr Route  Intravenous Documented By  Bhupendra Nieves RN              ondansetron (ZOFRAN) injection 8 mg       Admin Date  03/04/2024 Action  $Given Dose  8 mg Route  Intravenous Documented By  Bhupendra Nieves RN                    Intravenous Access:  Implanted Port.    Treatment Conditions:  Not Applicable.      Post Infusion Assessment:  Patient tolerated infusion without incident.  Blood return noted pre and post infusion.  Site patent and intact, free from redness, edema or discomfort.  No evidence of extravasations.  Access discontinued per protocol.       Discharge Plan:   Patient and/or family verbalized understanding of discharge instructions and all questions answered.  AVS to patient  via [facility].   Patient discharged in stable condition accompanied by: self.  Departure Mode: Ambulatory.      Bhupendra Nieves RN

## 2024-03-04 NOTE — PROGRESS NOTES
Social Work - Transportation  [facility]    Data/Intervention:  Patient Name: Jennifer Cervantes Goes By: Jennifer    /Age: 2000 (24 year old)    Referral From: [facility]  Reason for Referral:  support requested for patient transportation needs for today's appointment.  Assessment:  called [employer] to arrange will call ride home. Patient will need to call [phone] when ready for return ride home.  Plan: Patient is aware of the transportation plan.  available to assist with any other needs.    CARLOS Chavez,SW  Hematology/Oncology Social Worker  Phone:[phone] Pager: [phone]

## 2024-03-04 NOTE — TELEPHONE ENCOUNTER
Oncology Nurse Triage - Sickle Cell Pain Crisis:    Situation: Jennifer  calling about Sickle Cell Pain Crisis, requesting to be added on for IV fluids and pain medicine    Background:     Patient's last infusion was 2/26/24   Last clinic visit date:2/23/24 Patricia Mantilla   Does patient have active treatment plan?  Yes was in ER on 3/1/24       Assessment of Symptoms:  Onset/Duration of symptoms: 6 day    Is it typical sickle cell pain? Yes   Location: shoulders and going down into arms   Character: Sharp           Intensity: 9/10    Any radiation of pain, numbness, tingling, weakness, warmth, swelling, discoloration of arms or legs?No     Fever?No  (if yes max temperature recorded in last 24 hours):      Chest Pain Present: No     Shortness of breath: No     Other home therapies tried: HEAT/HEATING PAD and WARM BATH     Last home medication taken and when: 6am oxycodone and ibuprofen     Any Refills Needed?: No     Does patient have transportation & length of time to get to clinic: No needs transportation to clinic unless before 9am         Recommendations:     Placed on infusion call list     If you do not hear from the infusion center by 2pm then you will not be able to get in for an infusion today. If symptoms worsen while waiting for call back, and/or you experience fever, chills, SOB, chest pain, cough, n/v, dizziness, numbness, swelling, discoloration of extremities, then seek emergency evaluation in Emergency Department.     Please note, if you are late for your appt, you risk losing your infusion appt as it may delay another patient's infusion who arrived on time.

## 2024-03-06 ENCOUNTER — NURSE TRIAGE (OUTPATIENT)
Dept: ONCOLOGY | Facility: CLINIC | Age: 24
End: 2024-03-06
Payer: COMMERCIAL

## 2024-03-06 ENCOUNTER — PATIENT OUTREACH (OUTPATIENT)
Dept: CARE COORDINATION | Facility: CLINIC | Age: 24
End: 2024-03-06
Payer: COMMERCIAL

## 2024-03-06 ENCOUNTER — INFUSION THERAPY VISIT (OUTPATIENT)
Dept: INFUSION THERAPY | Facility: CLINIC | Age: 24
End: 2024-03-06
Attending: PEDIATRICS
Payer: COMMERCIAL

## 2024-03-06 VITALS
TEMPERATURE: 98.3 F | DIASTOLIC BLOOD PRESSURE: 70 MMHG | OXYGEN SATURATION: 95 % | RESPIRATION RATE: 18 BRPM | SYSTOLIC BLOOD PRESSURE: 108 MMHG | HEART RATE: 85 BPM

## 2024-03-06 DIAGNOSIS — D57.00 SICKLE CELL PAIN CRISIS (H): Primary | ICD-10-CM

## 2024-03-06 DIAGNOSIS — G81.10 SPASTIC HEMIPLEGIA, UNSPECIFIED ETIOLOGY, UNSPECIFIED LATERALITY (H): ICD-10-CM

## 2024-03-06 PROCEDURE — 96361 HYDRATE IV INFUSION ADD-ON: CPT

## 2024-03-06 PROCEDURE — 250N000013 HC RX MED GY IP 250 OP 250 PS 637: Performed by: PEDIATRICS

## 2024-03-06 PROCEDURE — 258N000003 HC RX IP 258 OP 636: Performed by: PEDIATRICS

## 2024-03-06 PROCEDURE — 96376 TX/PRO/DX INJ SAME DRUG ADON: CPT

## 2024-03-06 PROCEDURE — 96375 TX/PRO/DX INJ NEW DRUG ADDON: CPT

## 2024-03-06 PROCEDURE — 250N000011 HC RX IP 250 OP 636: Performed by: PEDIATRICS

## 2024-03-06 PROCEDURE — 96374 THER/PROPH/DIAG INJ IV PUSH: CPT

## 2024-03-06 RX ORDER — KETOROLAC TROMETHAMINE 30 MG/ML
30 INJECTION, SOLUTION INTRAMUSCULAR; INTRAVENOUS ONCE
Status: CANCELLED
Start: 2024-04-01 | End: 2024-04-01

## 2024-03-06 RX ORDER — ONDANSETRON 2 MG/ML
8 INJECTION INTRAMUSCULAR; INTRAVENOUS EVERY 6 HOURS PRN
Status: DISCONTINUED | OUTPATIENT
Start: 2024-03-06 | End: 2024-03-06 | Stop reason: HOSPADM

## 2024-03-06 RX ORDER — HEPARIN SODIUM (PORCINE) LOCK FLUSH IV SOLN 100 UNIT/ML 100 UNIT/ML
5 SOLUTION INTRAVENOUS
Status: DISCONTINUED | OUTPATIENT
Start: 2024-03-06 | End: 2024-03-06 | Stop reason: HOSPADM

## 2024-03-06 RX ORDER — DIPHENHYDRAMINE HCL 25 MG
50 CAPSULE ORAL
Status: CANCELLED
Start: 2024-04-01

## 2024-03-06 RX ORDER — ONDANSETRON 4 MG/1
8 TABLET, FILM COATED ORAL
Status: CANCELLED
Start: 2024-04-01

## 2024-03-06 RX ORDER — HEPARIN SODIUM,PORCINE 10 UNIT/ML
5 VIAL (ML) INTRAVENOUS
Status: CANCELLED | OUTPATIENT
Start: 2024-04-01

## 2024-03-06 RX ORDER — DIPHENHYDRAMINE HCL 25 MG
50 CAPSULE ORAL
Status: COMPLETED | OUTPATIENT
Start: 2024-03-06 | End: 2024-03-06

## 2024-03-06 RX ORDER — ONDANSETRON 2 MG/ML
8 INJECTION INTRAMUSCULAR; INTRAVENOUS EVERY 6 HOURS PRN
Status: CANCELLED
Start: 2024-04-01

## 2024-03-06 RX ORDER — KETOROLAC TROMETHAMINE 30 MG/ML
30 INJECTION, SOLUTION INTRAMUSCULAR; INTRAVENOUS ONCE
Status: COMPLETED | OUTPATIENT
Start: 2024-03-06 | End: 2024-03-06

## 2024-03-06 RX ORDER — HEPARIN SODIUM (PORCINE) LOCK FLUSH IV SOLN 100 UNIT/ML 100 UNIT/ML
5 SOLUTION INTRAVENOUS
Status: CANCELLED | OUTPATIENT
Start: 2024-04-01

## 2024-03-06 RX ADMIN — KETOROLAC TROMETHAMINE 30 MG: 30 INJECTION, SOLUTION INTRAMUSCULAR; INTRAVENOUS at 08:50

## 2024-03-06 RX ADMIN — DIPHENHYDRAMINE HYDROCHLORIDE 50 MG: 25 CAPSULE ORAL at 08:48

## 2024-03-06 RX ADMIN — SODIUM CHLORIDE, POTASSIUM CHLORIDE, SODIUM LACTATE AND CALCIUM CHLORIDE 1000 ML: 600; 310; 30; 20 INJECTION, SOLUTION INTRAVENOUS at 08:46

## 2024-03-06 RX ADMIN — ONDANSETRON 8 MG: 2 INJECTION INTRAMUSCULAR; INTRAVENOUS at 08:53

## 2024-03-06 RX ADMIN — Medication 5 ML: at 11:19

## 2024-03-06 RX ADMIN — HYDROMORPHONE HYDROCHLORIDE 1 MG: 1 INJECTION, SOLUTION INTRAMUSCULAR; INTRAVENOUS; SUBCUTANEOUS at 10:50

## 2024-03-06 RX ADMIN — HYDROMORPHONE HYDROCHLORIDE 1 MG: 1 INJECTION, SOLUTION INTRAMUSCULAR; INTRAVENOUS; SUBCUTANEOUS at 09:54

## 2024-03-06 RX ADMIN — HYDROMORPHONE HYDROCHLORIDE 1 MG: 1 INJECTION, SOLUTION INTRAMUSCULAR; INTRAVENOUS; SUBCUTANEOUS at 08:57

## 2024-03-06 ASSESSMENT — PAIN SCALES - GENERAL
PAINLEVEL: MODERATE PAIN (4)
PAINLEVEL: WORST PAIN (10)

## 2024-03-06 NOTE — PROGRESS NOTES
Social Work - Transportation  [facility]    Data/Intervention:  Patient Name: Jennifer Cervantes Goes By: Jennifer    /Age: 2000 (24 year old)    Referral From: [facility]  Reason for Referral:  support requested for patient transportation needs for today's appointment.  Assessment:  called [employer] to arrange ride through patient's insurance. [employer] arranged  for patient from home with [employer]. Patient will need to call [phone] when ready for return ride home.  Plan: Patient is aware of the transportation plan.  available to assist with any other needs.    CARLOS Chavez,LGUDAY  Hematology/Oncology Social Worker  Phone:[phone] Pager: [phone]

## 2024-03-06 NOTE — PATIENT INSTRUCTIONS
Dear Jennifer Cervantes    Thank you for choosing [facility] ([facility]) for your infusion.  The following information is a summary of our appointment as well as important reminders.      We look forward in seeing you on your next appointment here at [facility] ([facility]).  Please don t hesitate to call us at [phone] to reschedule any of your appointments or to speak with one of the [facility] registered nurses.  It was a pleasure taking care of you today.    Sincerely,    [facility]  [facility]  [address] [place] [address]  [id number]  Phone:  [phone]

## 2024-03-06 NOTE — TELEPHONE ENCOUNTER
Oncology Nurse Triage - Sickle Cell Pain Crisis:    Situation: Jennifer  calling about Sickle Cell Pain Crisis, requesting to be added on for IV fluids and pain medicine    Background:     Patient's last infusion was 3/4/24   Last clinic visit date:2/23/24 Patricia johnson   Does patient have active treatment plan?  Yes      Assessment of Symptoms:  Onset/Duration of symptoms: 1 day    Is it typical sickle cell pain? Yes   Location: back   Character: Sharp and Burning           Intensity: 10/10    Any radiation of pain, numbness, tingling, weakness, warmth, swelling, discoloration of arms or legs?No     Fever?No  (if yes max temperature recorded in last 24 hours):      Chest Pain Present: No     Shortness of breath: No     Other home therapies tried: HEAT/HEATING PAD and WARM BATH     Last home medication taken and when: 6:00am oxycodone and ibuprofen     Any Refills Needed?: No     Does patient have transportation & length of time to get to clinic: No needs help with transportation if has early time can get in on own         Recommendations:     Placed on infusion call list     If you do not hear from the infusion center by 2pm then you will not be able to get in for an infusion today. If symptoms worsen while waiting for call back, and/or you experience fever, chills, SOB, chest pain, cough, n/v, dizziness, numbness, swelling, discoloration of extremities, then seek emergency evaluation in Emergency Department.     Please note, if you are late for your appt, you risk losing your infusion appt as it may delay another patient's infusion who arrived on time.

## 2024-03-06 NOTE — PROGRESS NOTES
Infusion Nursing Note:  Jennifer Cervantes presents today for IVF & pain meds.    Patient seen by provider today: No   present during visit today: Not Applicable.    Note: 1L LR infused over 2 hours. Benadryl, Zofran, Toradol and Dilaudid (x3 doses) administered. Pain 10/10 upon arrival. Pain 4/10 at discharge. Pt states this is tolerable to go home. No labs ordered for this visit.       Intravenous Access:  Implanted Port.      Post Infusion Assessment:  Patient tolerated infusion without incident.  Blood return noted pre and post infusion.  Site patent and intact, free from redness, edema or discomfort.  Access discontinued per protocol.       Discharge Plan:   Discharge instructions reviewed with: Patient.  Patient and/or family verbalized understanding of discharge instructions and all questions answered.  AVS to patient via [facility] [employer].  Patient will return 3/22 for next appointment.   Patient discharged in stable condition accompanied by: self.  Departure Mode: Ambulatory.    Administrations This Visit       diphenhydrAMINE (BENADRYL) capsule 50 mg       Admin Date  03/06/2024 Action  $Given Dose  50 mg Route  Oral Documented By  Rin Mckeon RN              heparin 100 unit/mL injection 5 mL       Admin Date  03/06/2024 Action  $Given Dose  5 mL Route  Intracatheter Documented By  Rin Mckeon RN              HYDROmorphone (DILAUDID) injection 1 mg       Admin Date  03/06/2024 Action  $Given Dose  1 mg Route  Intravenous Documented By  Rin Mckeon RN               Admin Date  03/06/2024 Action  $Given Dose  1 mg Route  Intravenous Documented By  Rin Mckeon RN               Admin Date  03/06/2024 Action  $Given Dose  1 mg Route  Intravenous Documented By  Rin Mckeon RN              ketorolac (TORADOL) injection 30 mg       Admin Date  03/06/2024 Action  $Given Dose  30 mg Route  Intravenous Documented By  Rin Mckeon RN              lactated ringers BOLUS 1,000  mL       Admin Date  03/06/2024 Action  $New Bag Dose  1,000 mL Rate  500 mL/hr Route  Intravenous Documented By  Rin Mckeon, JOE              ondansetron (ZOFRAN) injection 8 mg       Admin Date  03/06/2024 Action  $Given Dose  8 mg Route  Intravenous Documented By  Rin Mckeon, JOE                  /78 (BP Location: Left arm, Patient Position: Sitting, Cuff Size: Adult Regular)   Pulse 98   Temp 98.3  F (36.8  C) (Oral)   Resp 18   SpO2 95%     Rin Mckeon, RN

## 2024-03-06 NOTE — TELEPHONE ENCOUNTER
[facility] can take at 8:30     Call placed to Jennifer and she can get a ride in for 8:30 appt. Will need ride home     Message sent to [email or web address] to schedule     Message sent to UDAY for ride home

## 2024-03-07 DIAGNOSIS — D57.00 SICKLE CELL PAIN CRISIS (H): ICD-10-CM

## 2024-03-07 RX ORDER — OXYCODONE HYDROCHLORIDE 10 MG/1
10 TABLET ORAL EVERY 4 HOURS PRN
Qty: 20 TABLET | Refills: 0 | Status: SHIPPED | OUTPATIENT
Start: 2024-03-08 | End: 2024-03-14

## 2024-03-07 NOTE — TELEPHONE ENCOUNTER
Narcotic Refill Request    Medication(s) requested:  oxycodone   Person Requesting Refill:Jennifer   What pain is the medication treating: sickle cell pain   How is the medication being taken?:takes every day every 6 hours   Does pt have enough for today? Has enough for today   Is pain being adequately controlled on the current regimen?: yes   Experiencing any side effects from medication?: No     Date of most recent appointment:  2/23/24 Patricia mantilla   Any No Show Visits: no   Next appointment:   3/18/24 DR Duncan   Last fill date and by whom:  2/29/24 Patricia Mantilla    Reviewed: No access     Send to provider: Patricia Mantilla

## 2024-03-08 ENCOUNTER — HOSPITAL ENCOUNTER (EMERGENCY)
Facility: CLINIC | Age: 24
Discharge: HOME | End: 2024-03-08
Attending: FAMILY MEDICINE | Admitting: FAMILY MEDICINE
Payer: COMMERCIAL

## 2024-03-08 ENCOUNTER — NURSE TRIAGE (OUTPATIENT)
Dept: ONCOLOGY | Facility: CLINIC | Age: 24
End: 2024-03-08
Payer: COMMERCIAL

## 2024-03-08 ENCOUNTER — APPOINTMENT (OUTPATIENT)
Dept: GENERAL RADIOLOGY | Facility: CLINIC | Age: 24
End: 2024-03-08
Attending: FAMILY MEDICINE
Payer: COMMERCIAL

## 2024-03-08 VITALS
TEMPERATURE: 98.5 F | RESPIRATION RATE: 18 BRPM | WEIGHT: 150 LBS | SYSTOLIC BLOOD PRESSURE: 115 MMHG | OXYGEN SATURATION: 96 % | DIASTOLIC BLOOD PRESSURE: 77 MMHG | BODY MASS INDEX: 25.61 KG/M2 | HEIGHT: 64 IN | HEART RATE: 93 BPM

## 2024-03-08 DIAGNOSIS — D57.00 SICKLE CELL PAIN CRISIS (H): ICD-10-CM

## 2024-03-08 LAB
ANION GAP SERPL CALCULATED.3IONS-SCNC: 8 MMOL/L (ref 7–15)
BASOPHILS # BLD AUTO: 0.2 10E3/UL (ref 0–0.2)
BASOPHILS NFR BLD AUTO: 2 %
BUN SERPL-MCNC: 6.3 MG/DL (ref 6–20)
CALCIUM SERPL-MCNC: 9.1 MG/DL (ref 8.6–10)
CHLORIDE SERPL-SCNC: 102 MMOL/L (ref 98–107)
CREAT SERPL-MCNC: 0.48 MG/DL (ref 0.51–0.95)
DEPRECATED HCO3 PLAS-SCNC: 24 MMOL/L (ref 22–29)
EGFRCR SERPLBLD CKD-EPI 2021: >90 ML/MIN/1.73M2
EOSINOPHIL # BLD AUTO: 0.6 10E3/UL (ref 0–0.7)
EOSINOPHIL NFR BLD AUTO: 5 %
ERYTHROCYTE [DISTWIDTH] IN BLOOD BY AUTOMATED COUNT: 25.7 % (ref 10–15)
GLUCOSE SERPL-MCNC: 82 MG/DL (ref 70–99)
HCT VFR BLD AUTO: 21.7 % (ref 35–47)
HGB BLD-MCNC: 7.7 G/DL (ref 11.7–15.7)
IMM GRANULOCYTES # BLD: 0.1 10E3/UL
IMM GRANULOCYTES NFR BLD: 1 %
LYMPHOCYTES # BLD AUTO: 2.3 10E3/UL (ref 0.8–5.3)
LYMPHOCYTES NFR BLD AUTO: 20 %
MCH RBC QN AUTO: 31.4 PG (ref 26.5–33)
MCHC RBC AUTO-ENTMCNC: 35.5 G/DL (ref 31.5–36.5)
MCV RBC AUTO: 89 FL (ref 78–100)
MONOCYTES # BLD AUTO: 0.9 10E3/UL (ref 0–1.3)
MONOCYTES NFR BLD AUTO: 8 %
NEUTROPHILS # BLD AUTO: 7.4 10E3/UL (ref 1.6–8.3)
NEUTROPHILS NFR BLD AUTO: 64 %
NRBC # BLD AUTO: 0.3 10E3/UL
NRBC BLD AUTO-RTO: 3 /100
PLATELET # BLD AUTO: 453 10E3/UL (ref 150–450)
POTASSIUM SERPL-SCNC: 4.1 MMOL/L (ref 3.4–5.3)
RBC # BLD AUTO: 2.45 10E6/UL (ref 3.8–5.2)
RETICS # AUTO: 0.66 10E6/UL (ref 0.03–0.1)
RETICS/RBC NFR AUTO: 26.5 % (ref 0.5–2)
SODIUM SERPL-SCNC: 134 MMOL/L (ref 135–145)
WBC # BLD AUTO: 11.5 10E3/UL (ref 4–11)

## 2024-03-08 PROCEDURE — 71046 X-RAY EXAM CHEST 2 VIEWS: CPT

## 2024-03-08 PROCEDURE — 250N000009 HC RX 250: Performed by: FAMILY MEDICINE

## 2024-03-08 PROCEDURE — 85045 AUTOMATED RETICULOCYTE COUNT: CPT | Performed by: EMERGENCY MEDICINE

## 2024-03-08 PROCEDURE — 258N000003 HC RX IP 258 OP 636: Performed by: EMERGENCY MEDICINE

## 2024-03-08 PROCEDURE — 250N000011 HC RX IP 250 OP 636: Performed by: EMERGENCY MEDICINE

## 2024-03-08 PROCEDURE — 96374 THER/PROPH/DIAG INJ IV PUSH: CPT | Performed by: FAMILY MEDICINE

## 2024-03-08 PROCEDURE — 80048 BASIC METABOLIC PNL TOTAL CA: CPT | Performed by: EMERGENCY MEDICINE

## 2024-03-08 PROCEDURE — 85025 COMPLETE CBC W/AUTO DIFF WBC: CPT | Performed by: EMERGENCY MEDICINE

## 2024-03-08 PROCEDURE — 250N000011 HC RX IP 250 OP 636: Performed by: FAMILY MEDICINE

## 2024-03-08 PROCEDURE — 99284 EMERGENCY DEPT VISIT MOD MDM: CPT | Mod: 25 | Performed by: FAMILY MEDICINE

## 2024-03-08 PROCEDURE — 96361 HYDRATE IV INFUSION ADD-ON: CPT | Performed by: FAMILY MEDICINE

## 2024-03-08 PROCEDURE — 99285 EMERGENCY DEPT VISIT HI MDM: CPT | Performed by: FAMILY MEDICINE

## 2024-03-08 PROCEDURE — 96375 TX/PRO/DX INJ NEW DRUG ADDON: CPT | Performed by: FAMILY MEDICINE

## 2024-03-08 PROCEDURE — 96376 TX/PRO/DX INJ SAME DRUG ADON: CPT | Performed by: FAMILY MEDICINE

## 2024-03-08 PROCEDURE — 36415 COLL VENOUS BLD VENIPUNCTURE: CPT | Performed by: EMERGENCY MEDICINE

## 2024-03-08 RX ORDER — ONDANSETRON 2 MG/ML
4 INJECTION INTRAMUSCULAR; INTRAVENOUS ONCE
Status: COMPLETED | OUTPATIENT
Start: 2024-03-08 | End: 2024-03-08

## 2024-03-08 RX ORDER — ONDANSETRON 2 MG/ML
4 INJECTION INTRAMUSCULAR; INTRAVENOUS EVERY 8 HOURS PRN
Status: DISCONTINUED | OUTPATIENT
Start: 2024-03-08 | End: 2024-03-08 | Stop reason: HOSPADM

## 2024-03-08 RX ORDER — IPRATROPIUM BROMIDE AND ALBUTEROL SULFATE 2.5; .5 MG/3ML; MG/3ML
3 SOLUTION RESPIRATORY (INHALATION) ONCE
Status: COMPLETED | OUTPATIENT
Start: 2024-03-08 | End: 2024-03-08

## 2024-03-08 RX ORDER — SODIUM CHLORIDE, SODIUM LACTATE, POTASSIUM CHLORIDE, CALCIUM CHLORIDE 600; 310; 30; 20 MG/100ML; MG/100ML; MG/100ML; MG/100ML
INJECTION, SOLUTION INTRAVENOUS CONTINUOUS
Status: DISCONTINUED | OUTPATIENT
Start: 2024-03-08 | End: 2024-03-08 | Stop reason: HOSPADM

## 2024-03-08 RX ORDER — HEPARIN SODIUM (PORCINE) LOCK FLUSH IV SOLN 100 UNIT/ML 100 UNIT/ML
100 SOLUTION INTRAVENOUS ONCE
Status: COMPLETED | OUTPATIENT
Start: 2024-03-08 | End: 2024-03-08

## 2024-03-08 RX ORDER — KETOROLAC TROMETHAMINE 30 MG/ML
30 INJECTION, SOLUTION INTRAMUSCULAR; INTRAVENOUS ONCE
Status: COMPLETED | OUTPATIENT
Start: 2024-03-08 | End: 2024-03-08

## 2024-03-08 RX ADMIN — HYDROMORPHONE HYDROCHLORIDE 1 MG: 1 INJECTION, SOLUTION INTRAMUSCULAR; INTRAVENOUS; SUBCUTANEOUS at 10:53

## 2024-03-08 RX ADMIN — HYDROMORPHONE HYDROCHLORIDE 1 MG: 1 INJECTION, SOLUTION INTRAMUSCULAR; INTRAVENOUS; SUBCUTANEOUS at 09:47

## 2024-03-08 RX ADMIN — ONDANSETRON 4 MG: 2 INJECTION INTRAMUSCULAR; INTRAVENOUS at 11:24

## 2024-03-08 RX ADMIN — HEPARIN 100 UNITS: 100 SYRINGE at 13:14

## 2024-03-08 RX ADMIN — IPRATROPIUM BROMIDE AND ALBUTEROL SULFATE 3 ML: .5; 3 SOLUTION RESPIRATORY (INHALATION) at 09:51

## 2024-03-08 RX ADMIN — HYDROMORPHONE HYDROCHLORIDE 1 MG: 1 INJECTION, SOLUTION INTRAMUSCULAR; INTRAVENOUS; SUBCUTANEOUS at 12:02

## 2024-03-08 RX ADMIN — KETOROLAC TROMETHAMINE 30 MG: 30 INJECTION, SOLUTION INTRAMUSCULAR at 09:49

## 2024-03-08 RX ADMIN — SODIUM CHLORIDE, POTASSIUM CHLORIDE, SODIUM LACTATE AND CALCIUM CHLORIDE: 600; 310; 30; 20 INJECTION, SOLUTION INTRAVENOUS at 09:45

## 2024-03-08 ASSESSMENT — ACTIVITIES OF DAILY LIVING (ADL)
ADLS_ACUITY_SCORE: 37
ADLS_ACUITY_SCORE: 35
ADLS_ACUITY_SCORE: 37
ADLS_ACUITY_SCORE: 37
ADLS_ACUITY_SCORE: 35

## 2024-03-08 ASSESSMENT — COLUMBIA-SUICIDE SEVERITY RATING SCALE - C-SSRS
1. IN THE PAST MONTH, HAVE YOU WISHED YOU WERE DEAD OR WISHED YOU COULD GO TO SLEEP AND NOT WAKE UP?: NO
6. HAVE YOU EVER DONE ANYTHING, STARTED TO DO ANYTHING, OR PREPARED TO DO ANYTHING TO END YOUR LIFE?: NO
2. HAVE YOU ACTUALLY HAD ANY THOUGHTS OF KILLING YOURSELF IN THE PAST MONTH?: NO

## 2024-03-08 NOTE — TELEPHONE ENCOUNTER
Jennifer call in she is having sharp pains in her stomach and her back is a 10/10. She states she will be going in to the ER and wanted her care team to know.

## 2024-03-08 NOTE — ED TRIAGE NOTES
Sickle cell crisis. Back pain and asthma problems since last night.       Triage Assessment (Adult)       Row Name 03/08/24 0803          Triage Assessment    Airway WDL WDL        Respiratory WDL    Respiratory WDL WDL        Skin Circulation/Temperature WDL    Skin Circulation/Temperature WDL WDL        Cardiac WDL    Cardiac WDL WDL        Peripheral/Neurovascular WDL    Peripheral Neurovascular WDL WDL        Cognitive/Neuro/Behavioral WDL    Cognitive/Neuro/Behavioral WDL WDL

## 2024-03-08 NOTE — ED PROVIDER NOTES
"ED Provider Note  [facility]      History     Chief Complaint   Patient presents with    Sickle Cell Pain Crisis     Sickle cell crisis. Back pain and asthma problems since last night.     HPI  Jennifer Cervantes is a 24 year old female who is a history of sickle cell disease and asthma.  She states last night she developed diffuse pain in her back both upper and lower, bilateral, nonradiating.  This is not pleuritic.  There is no associated numbness weakness or tingling.  She does have chronic right hemiparesis related to a prior cerebral infarct.  This is not changed.  No fever, chills, cough or runny nose.  She took her regular home pain medications and the pain is actually improved but not relieved.  She states \"the pain is above that line\", stating that she generally needs pain control either at the infusion center or the ED in this circumstance.  Also has left upper quadrant abdominal pain which she has been dealing with for several months, states they are working on getting an endoscopy there is a prior history of an ulcer.  Is not vomiting, no diarrhea, no change in bowel habit.    Past Medical History  Past Medical History:   Diagnosis Date    Anxiety     Bleeding disorder (H24)     Blood clotting disorder (H24)     Cerebral infarction (H) 2015    Cognitive developmental delay     low IQ. 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 side (H)     right hand contractures    Iron overload due to repeated red blood cell transfusions     Migraines     Multiple subsegmental pulmonary emboli without acute cor pulmonale (H) 02/01/2021    Oppositional defiant behavior     Presence of intrauterine contraceptive device 2/18/2020    Superficial venous thrombosis of arm, right 03/25/2021    Uncomplicated asthma      Past Surgical History:   Procedure Laterality Date    AS " INSERT TUNNELED CV 2 CATH W/O PORT/PUMP      CHOLECYSTECTOMY      CV RIGHT HEART CATH MEASUREMENTS RECORDED N/A 11/18/2021    Procedure: Right Heart Cath;  Surgeon: Jackson Stauffer MD;  Location:  HEART CARDIAC CATH LAB    INSERT PORT VASCULAR ACCESS Left 4/21/2021    Procedure: INSERTION, VASCULAR ACCESS PORT (NOT SURE ON SIDE UNTIL REMOVAL);  Surgeon: Rajan More MD;  Location: [facility] OR    IR CHEST PORT PLACEMENT > 5 YRS OF AGE  4/21/2021    IR CVC NON TUNNEL LINE REMOVAL  5/6/2021    IR CVC NON TUNNEL PLACEMENT > 5 YRS  04/07/2020    IR CVC NON TUNNEL PLACEMENT > 5 YRS  4/30/2021    IR CVC NON TUNNEL PLACEMENT > 5 YRS  9/7/2022    IR PORT REMOVAL LEFT  4/21/2021    REMOVE PORT VASCULAR ACCESS Left 4/21/2021    Procedure: REMOVAL, VASCULAR ACCESS PORT LEFT;  Surgeon: Rajan More MD;  Location: [facility] OR    REPAIR TENDON ELBOW Right 10/02/2019    Procedure: Right Elbow Flexor Lengthening, Flexor Pronator Slide Of Wrist and Finger, Thumb Adductor Lengthening;  Surgeon: Anai Franco MD;  Location: [facility] OR    TONSILLECTOMY Bilateral 10/02/2019    Procedure: Bilateral Tonsillectomy;  Surgeon: Farhana Guy MD;  Location: [facility] OR    ZZC BREAST REDUCTION (INCLUDES LIPO) TIER 3 Bilateral 04/23/2019     acetaminophen (TYLENOL) 325 MG tablet  albuterol (PROAIR HFA/PROVENTIL HFA/VENTOLIN HFA) 108 (90 Base) MCG/ACT inhaler  albuterol (PROVENTIL) (2.5 MG/3ML) 0.083% neb solution  aspirin (ASA) 81 MG chewable tablet  budesonide-formoterol (SYMBICORT) 160-4.5 MCG/ACT Inhaler  budesonide-formoterol (SYMBICORT) 160-4.5 MCG/ACT Inhaler  cetirizine (ZYRTEC) 10 MG tablet  deferasirox (JADENU) 360 MG tablet  diphenhydrAMINE (BENADRYL) 25 MG capsule  EPINEPHrine (ANY BX GENERIC EQUIV) 0.3 MG/0.3ML injection 2-pack  Hydroxyurea 1000 MG TABS  melatonin 5 MG tablet  methocarbamol (ROBAXIN) 750 MG tablet  naloxone (NARCAN) 4 MG/0.1ML nasal spray  omeprazole (PRILOSEC) 20 MG DR capsule  ondansetron (ZOFRAN) 8 MG  "tablet  oxyCODONE IR (ROXICODONE) 10 MG tablet      Allergies   Allergen Reactions    Contrast Dye      Hives and breathing issues    Fish-Derived Products Hives    Seafood Hives    Adhesive Tape Hives     Primipore dressing causes hives    Gadolinium     Iodinated Contrast Media      Family History  Family History   Problem Relation Age of Onset    Sickle Cell Trait Mother     Hypertension Mother     Asthma Mother     Sickle Cell Trait Father     Glaucoma No family hx of     Macular Degeneration No family hx of     Diabetes No family hx of     Gout No family hx of      Social History   Social History     Tobacco Use    Smoking status: Never     Passive exposure: Never    Smokeless tobacco: Never   Substance Use Topics    Alcohol use: Not Currently     Alcohol/week: 0.0 standard drinks of alcohol    Drug use: Never         A medically appropriate review of systems was performed with pertinent positives and negatives noted in the HPI, and all other systems negative.    Physical Exam   BP: 115/77  Pulse: 93  Temp: 98.5  F (36.9  C)  Resp: 18  Height: 162.6 cm (5' 4\")  Weight: 68 kg (150 lb)  SpO2: 96 %  Physical Exam  Vitals and nursing note reviewed.   Constitutional:       General: She is not in acute distress.     Appearance: Normal appearance. She is not diaphoretic.   HENT:      Head: Atraumatic.      Mouth/Throat:      Mouth: Mucous membranes are moist.   Eyes:      General: No scleral icterus.     Conjunctiva/sclera: Conjunctivae normal.   Cardiovascular:      Rate and Rhythm: Normal rate.      Heart sounds: Normal heart sounds.   Pulmonary:      Effort: No respiratory distress.      Breath sounds: Normal breath sounds.   Abdominal:      General: Abdomen is flat.      Tenderness: There is abdominal tenderness in the left upper quadrant. There is no left CVA tenderness, guarding or rebound.   Musculoskeletal:      Cervical back: Neck supple.      Comments: Diffuse tenderness of the musculature of her entire " back.  No point vertebral midline tenderness.   Skin:     General: Skin is warm.      Findings: No rash.   Neurological:      Mental Status: She is alert.           ED Course, Procedures, & Data      Procedures               Results for orders placed or performed during the hospital encounter of 03/08/24   XR Chest 2 Views     Status: None    Narrative    CHEST TWO VIEWS  3/8/2024 11:10 AM     HISTORY: Upper back pain, cough, sickle cell.    COMPARISON: 12/31/2023.      Impression    IMPRESSION: No acute cardiopulmonary disease. Stable left chest  Port-A-Cath.    CAT DAIZ MD         SYSTEM ID:  [id number]   Basic metabolic panel     Status: Abnormal   Result Value Ref Range    Sodium 134 (L) 135 - 145 mmol/L    Potassium 4.1 3.4 - 5.3 mmol/L    Chloride 102 98 - 107 mmol/L    Carbon Dioxide (CO2) 24 22 - 29 mmol/L    Anion Gap 8 7 - 15 mmol/L    Urea Nitrogen 6.3 6.0 - 20.0 mg/dL    Creatinine 0.48 (L) 0.51 - 0.95 mg/dL    GFR Estimate >90 >60 mL/min/1.73m2    Calcium 9.1 8.6 - 10.0 mg/dL    Glucose 82 70 - 99 mg/dL   Reticulocyte count     Status: Abnormal   Result Value Ref Range    % Reticulocyte 26.5 (H) 0.5 - 2.0 %    Absolute Reticulocyte 0.660 (H) 0.025 - 0.095 10e6/uL   CBC with platelets and differential     Status: Abnormal   Result Value Ref Range    WBC Count 11.5 (H) 4.0 - 11.0 10e3/uL    RBC Count 2.45 (L) 3.80 - 5.20 10e6/uL    Hemoglobin 7.7 (L) 11.7 - 15.7 g/dL    Hematocrit 21.7 (L) 35.0 - 47.0 %    MCV 89 78 - 100 fL    MCH 31.4 26.5 - 33.0 pg    MCHC 35.5 31.5 - 36.5 g/dL    RDW 25.7 (H) 10.0 - 15.0 %    Platelet Count 453 (H) 150 - 450 10e3/uL    % Neutrophils 64 %    % Lymphocytes 20 %    % Monocytes 8 %    % Eosinophils 5 %    % Basophils 2 %    % Immature Granulocytes 1 %    NRBCs per 100 WBC 3 (H) <1 /100    Absolute Neutrophils 7.4 1.6 - 8.3 10e3/uL    Absolute Lymphocytes 2.3 0.8 - 5.3 10e3/uL    Absolute Monocytes 0.9 0.0 - 1.3 10e3/uL    Absolute Eosinophils 0.6 0.0 - 0.7 10e3/uL     Absolute Basophils 0.2 0.0 - 0.2 10e3/uL    Absolute Immature Granulocytes 0.1 <=0.4 10e3/uL    Absolute NRBCs 0.3 10e3/uL   CBC with platelets differential     Status: Abnormal    Narrative    The following orders were created for panel order CBC with platelets differential.  Procedure                               Abnormality         Status                     ---------                               -----------         ------                     CBC with platelets and d...[[id number]]  Abnormal            Final result                 Please view results for these tests on the individual orders.     Medications   HYDROmorphone (DILAUDID) injection 1 mg (1 mg Intravenous $Given 3/8/24 0947)   lactated ringers infusion (0 mLs Intravenous Stopped 3/8/24 1310)   ondansetron (ZOFRAN) injection 4 mg (4 mg Intravenous $Given 3/8/24 1124)   ketorolac (TORADOL) injection 30 mg (30 mg Intravenous $Given 3/8/24 0949)   ipratropium - albuterol 0.5 mg/2.5 mg/3 mL (DUONEB) neb solution 3 mL (3 mLs Nebulization $Given 3/8/24 0951)   HYDROmorphone (DILAUDID) injection 1 mg (1 mg Intravenous $Given 3/8/24 1053)   ondansetron (ZOFRAN) injection 4 mg (4 mg Intravenous Not Given 3/8/24 1301)   HYDROmorphone (DILAUDID) injection 1 mg (1 mg Intravenous $Given 3/8/24 1202)   heparin 100 unit/mL injection 100 Units (100 Units Intravenous $Given 3/8/24 1314)     Labs Ordered and Resulted from Time of ED Arrival to Time of ED Departure   BASIC METABOLIC PANEL - Abnormal       Result Value    Sodium 134 (*)     Potassium 4.1      Chloride 102      Carbon Dioxide (CO2) 24      Anion Gap 8      Urea Nitrogen 6.3      Creatinine 0.48 (*)     GFR Estimate >90      Calcium 9.1      Glucose 82     RETICULOCYTE COUNT - Abnormal    % Reticulocyte 26.5 (*)     Absolute Reticulocyte 0.660 (*)    CBC WITH PLATELETS AND DIFFERENTIAL - Abnormal    WBC Count 11.5 (*)     RBC Count 2.45 (*)     Hemoglobin 7.7 (*)     Hematocrit 21.7 (*)     MCV 89      MCH  31.4      MCHC 35.5      RDW 25.7 (*)     Platelet Count 453 (*)     % Neutrophils 64      % Lymphocytes 20      % Monocytes 8      % Eosinophils 5      % Basophils 2      % Immature Granulocytes 1      NRBCs per 100 WBC 3 (*)     Absolute Neutrophils 7.4      Absolute Lymphocytes 2.3      Absolute Monocytes 0.9      Absolute Eosinophils 0.6      Absolute Basophils 0.2      Absolute Immature Granulocytes 0.1      Absolute NRBCs 0.3     HCG QUALITATIVE URINE POCT     XR Chest 2 Views   Final Result   IMPRESSION: No acute cardiopulmonary disease. Stable left chest   Port-A-Cath.      CAT DIAZ MD            SYSTEM ID:  [id number]             Critical care was not performed.     Medical Decision Making  The patient's presentation was of high complexity (a chronic illness severe exacerbation, progression, or side effect of treatment).    The patient's evaluation involved:  review of external note(s) from 3+ sources (reviewed multiple notes from hematology clinic and from prior ED visits)  review of 2 test result(s) ordered prior to this encounter (review of prior hemoglobin and retake count values from prior encounters for comparison)  ordering and/or review of 3+ test(s) in this encounter (see separate area of note for details)  independent interpretation of testing performed by another health professional (chest x-ray images personally reviewed and reviewed radiologist interpretation)    The patient's management necessitated moderate risk (prescription drug management including medications given in the ED), high risk (a parenteral controlled substance), and high risk (a decision regarding hospitalization).    Assessment & Plan    24-year-old female with a history of sickle cell disease, asthma, and intermittent sickle cell pain crisis.  Today presenting with diffuse back pain and left upper quadrant abdominal pain.  Patient states this is very similar pain to multiple prior episodes of sickle cell pain crisis.    On  exam she has normal vital signs, she is not in any respiratory distress.  Her upper airway is clear.  Diffuse muscular tenderness of the back but no point vertebral tenderness and her lungs are clear.  She has mild left upper quadrant tenderness without peritoneal signs.  No CVA tenderness.  Her hemoglobin is 7.7 reticulocyte count 26.5 both very comparable to recent and multiple prior checks.  There is no sign of chest syndrome, pneumonia, pneumothorax, sepsis, infarct or other complication based on the exam and diagnostic studies.  Clinically she has improved with the treatment outlined in her care plan for ED or infusion center, reports she is feeling better.  She is now requesting discharge.  Has appropriate follow-up planned through hematology.  We discussed the indications for emergency department return and follow-up.  Stable for discharge.      I have reviewed the nursing notes. I have reviewed the findings, diagnosis, plan and need for follow up with the patient.    New Prescriptions    No medications on file       Final diagnoses:   Sickle cell pain crisis (H)       Ifeanyi Valdez MD  [facility] EMERGENCY DEPARTMENT  3/8/2024     Ifeanyi Valdez MD  03/08/24 1826

## 2024-03-08 NOTE — DISCHARGE INSTRUCTIONS
Thank you for choosing [facility].     Please closely monitor for further symptoms. Return to the Emergency Department if you develop any new or worsening signs or symptoms.    If you received any opiate pain medications or sedatives during your visit, please do not drive for at least 8 hours.     Labs, cultures or final xray interpretations may still need to be reviewed.  We will call you if your plan of care needs to be changed.    Please follow up with your hematology provider as soon as possible.

## 2024-03-11 ENCOUNTER — INFUSION THERAPY VISIT (OUTPATIENT)
Dept: TRANSPLANT | Facility: CLINIC | Age: 24
End: 2024-03-11
Attending: PEDIATRICS
Payer: COMMERCIAL

## 2024-03-11 ENCOUNTER — NURSE TRIAGE (OUTPATIENT)
Dept: ONCOLOGY | Facility: CLINIC | Age: 24
End: 2024-03-11
Payer: COMMERCIAL

## 2024-03-11 ENCOUNTER — PATIENT OUTREACH (OUTPATIENT)
Dept: CARE COORDINATION | Facility: CLINIC | Age: 24
End: 2024-03-11
Payer: COMMERCIAL

## 2024-03-11 VITALS
OXYGEN SATURATION: 100 % | HEART RATE: 92 BPM | DIASTOLIC BLOOD PRESSURE: 72 MMHG | TEMPERATURE: 98 F | SYSTOLIC BLOOD PRESSURE: 133 MMHG | RESPIRATION RATE: 16 BRPM

## 2024-03-11 DIAGNOSIS — G81.10 SPASTIC HEMIPLEGIA, UNSPECIFIED ETIOLOGY, UNSPECIFIED LATERALITY (H): ICD-10-CM

## 2024-03-11 DIAGNOSIS — D57.00 SICKLE CELL PAIN CRISIS (H): Primary | ICD-10-CM

## 2024-03-11 PROCEDURE — 258N000003 HC RX IP 258 OP 636: Performed by: PEDIATRICS

## 2024-03-11 PROCEDURE — 250N000011 HC RX IP 250 OP 636: Performed by: PEDIATRICS

## 2024-03-11 PROCEDURE — 96361 HYDRATE IV INFUSION ADD-ON: CPT

## 2024-03-11 PROCEDURE — 96374 THER/PROPH/DIAG INJ IV PUSH: CPT

## 2024-03-11 PROCEDURE — 250N000013 HC RX MED GY IP 250 OP 250 PS 637: Performed by: PEDIATRICS

## 2024-03-11 PROCEDURE — 96376 TX/PRO/DX INJ SAME DRUG ADON: CPT

## 2024-03-11 PROCEDURE — 96375 TX/PRO/DX INJ NEW DRUG ADDON: CPT

## 2024-03-11 RX ORDER — ONDANSETRON 4 MG/1
8 TABLET, FILM COATED ORAL
Status: CANCELLED
Start: 2024-04-01

## 2024-03-11 RX ORDER — DIPHENHYDRAMINE HCL 25 MG
50 CAPSULE ORAL
Status: COMPLETED | OUTPATIENT
Start: 2024-03-11 | End: 2024-03-11

## 2024-03-11 RX ORDER — ONDANSETRON 4 MG/1
8 TABLET, ORALLY DISINTEGRATING ORAL
Status: DISCONTINUED | OUTPATIENT
Start: 2024-03-11 | End: 2024-03-11 | Stop reason: HOSPADM

## 2024-03-11 RX ORDER — HEPARIN SODIUM (PORCINE) LOCK FLUSH IV SOLN 100 UNIT/ML 100 UNIT/ML
5 SOLUTION INTRAVENOUS ONCE
Status: COMPLETED | OUTPATIENT
Start: 2024-03-11 | End: 2024-03-11

## 2024-03-11 RX ORDER — KETOROLAC TROMETHAMINE 30 MG/ML
30 INJECTION, SOLUTION INTRAMUSCULAR; INTRAVENOUS ONCE
Status: CANCELLED
Start: 2024-04-01 | End: 2024-04-01

## 2024-03-11 RX ORDER — ONDANSETRON 2 MG/ML
8 INJECTION INTRAMUSCULAR; INTRAVENOUS EVERY 6 HOURS PRN
Status: CANCELLED
Start: 2024-04-01

## 2024-03-11 RX ORDER — HEPARIN SODIUM,PORCINE 10 UNIT/ML
5 VIAL (ML) INTRAVENOUS
Status: CANCELLED | OUTPATIENT
Start: 2024-04-01

## 2024-03-11 RX ORDER — KETOROLAC TROMETHAMINE 30 MG/ML
30 INJECTION, SOLUTION INTRAMUSCULAR; INTRAVENOUS ONCE
Status: COMPLETED | OUTPATIENT
Start: 2024-03-11 | End: 2024-03-11

## 2024-03-11 RX ORDER — DIPHENHYDRAMINE HCL 25 MG
50 CAPSULE ORAL
Status: CANCELLED
Start: 2024-04-01

## 2024-03-11 RX ORDER — ONDANSETRON 2 MG/ML
8 INJECTION INTRAMUSCULAR; INTRAVENOUS EVERY 6 HOURS PRN
Status: DISCONTINUED | OUTPATIENT
Start: 2024-03-11 | End: 2024-03-11 | Stop reason: HOSPADM

## 2024-03-11 RX ORDER — HEPARIN SODIUM (PORCINE) LOCK FLUSH IV SOLN 100 UNIT/ML 100 UNIT/ML
5 SOLUTION INTRAVENOUS
Status: CANCELLED | OUTPATIENT
Start: 2024-04-01

## 2024-03-11 RX ADMIN — ONDANSETRON 8 MG: 2 INJECTION INTRAMUSCULAR; INTRAVENOUS at 12:02

## 2024-03-11 RX ADMIN — HYDROMORPHONE HYDROCHLORIDE 1 MG: 1 INJECTION, SOLUTION INTRAMUSCULAR; INTRAVENOUS; SUBCUTANEOUS at 12:58

## 2024-03-11 RX ADMIN — Medication 5 ML: at 15:10

## 2024-03-11 RX ADMIN — HYDROMORPHONE HYDROCHLORIDE 1 MG: 1 INJECTION, SOLUTION INTRAMUSCULAR; INTRAVENOUS; SUBCUTANEOUS at 14:02

## 2024-03-11 RX ADMIN — DIPHENHYDRAMINE HYDROCHLORIDE 50 MG: 25 CAPSULE ORAL at 12:04

## 2024-03-11 RX ADMIN — HYDROMORPHONE HYDROCHLORIDE 1 MG: 1 INJECTION, SOLUTION INTRAMUSCULAR; INTRAVENOUS; SUBCUTANEOUS at 12:00

## 2024-03-11 RX ADMIN — KETOROLAC TROMETHAMINE 30 MG: 30 INJECTION, SOLUTION INTRAMUSCULAR; INTRAVENOUS at 12:04

## 2024-03-11 RX ADMIN — SODIUM CHLORIDE, POTASSIUM CHLORIDE, SODIUM LACTATE AND CALCIUM CHLORIDE 1000 ML: 600; 310; 30; 20 INJECTION, SOLUTION INTRAVENOUS at 12:02

## 2024-03-11 ASSESSMENT — PAIN SCALES - GENERAL: PAINLEVEL: EXTREME PAIN (9)

## 2024-03-11 NOTE — PROGRESS NOTES
Infusion Nursing Note:  Jennifer Cervantes presents today for add-on infusion.    Patient seen by provider today: No   present during visit today: Not Applicable.    Note: Patient received dilaudid x3, 1 L LR bolus, zofran IV, benadryl PO and Toradol for 9/10 abdominal and lower extremity pain with some relief. Patient discharged with pain at 5/10. Stable upon discharge.      Intravenous Access:  Implanted Port.      Post Infusion Assessment:  Patient tolerated infusion without incident.       Discharge Plan:   Patient and/or family verbalized understanding of discharge instructions and all questions answered.      Vicenta Boone RN

## 2024-03-11 NOTE — PROGRESS NOTES
Social Work - Transportation  [facility]    Data/Intervention:  Patient Name: Jennifer Cervantes Goes By: Jennifer    /Age: 2000 (24 year old)    Referral From: [facility]   Reason for Referral:  support requested for patient transportation needs for today's appointment.  Assessment:  called [employer] to arrange ride through patient's insurance. [employer] arranged  for patient from home with [employer]. Patient will need to call [phone] when ready for return ride home.  Plan: Patient is aware of the transportation plan.  available to assist with any other needs.    CARLOS Chavez,LGUDAY  Hematology/Oncology Social Worker  Phone:[phone] Pager: [phone]

## 2024-03-11 NOTE — TELEPHONE ENCOUNTER
Oncology Nurse Triage - Sickle Cell Pain Crisis:    Situation: Jennifer  calling about Sickle Cell Pain Crisis, requesting to be added on for IV fluids and pain medicine    Background:     Patient's last infusion was 3/8/99094 in ED for back pain  Last clinic visit date:2/23/2024   Does patient have active treatment plan?  Yes      Assessment of Symptoms:  Onset/Duration of symptoms: 1 day    Is it typical sickle cell pain? Yes   Location: hips down  Character: Sharp           Intensity: 9/10    Any radiation of pain, numbness, tingling, weakness, warmth, swelling, discoloration of arms or legs?No     Fever?No    Chest Pain Present: No     Shortness of breath: No     Other home therapies tried: HEAT/HEATING PAD and WARM BATH     Last home medication taken and when: 6:00am Oxycodone    Any Refills Needed?: No     Does patient have transportation & length of time to get to clinic: No         Recommendations:   Meets protocol for new week.     0750 Offer for 12:00pm  IVF/Pain appt with [facility] infusion clinics, pt in agreement with plan and request transportation assist.     0752  request sent to assist with transportation    0753 Scheduling request sent to add to BMT infusion schedule.     Please note, if you are late for your appt, you risk losing your infusion appt as it may delay another patient's infusion who arrived on time.

## 2024-03-12 ENCOUNTER — HOSPITAL ENCOUNTER (EMERGENCY)
Facility: CLINIC | Age: 24
Discharge: HOME | End: 2024-03-13
Attending: EMERGENCY MEDICINE | Admitting: EMERGENCY MEDICINE
Payer: COMMERCIAL

## 2024-03-12 DIAGNOSIS — D57.00 SICKLE CELL PAIN CRISIS (H): ICD-10-CM

## 2024-03-12 PROCEDURE — 99284 EMERGENCY DEPT VISIT MOD MDM: CPT | Mod: 25 | Performed by: EMERGENCY MEDICINE

## 2024-03-12 PROCEDURE — 96375 TX/PRO/DX INJ NEW DRUG ADDON: CPT | Performed by: EMERGENCY MEDICINE

## 2024-03-12 PROCEDURE — 96361 HYDRATE IV INFUSION ADD-ON: CPT | Performed by: EMERGENCY MEDICINE

## 2024-03-12 PROCEDURE — 99285 EMERGENCY DEPT VISIT HI MDM: CPT | Performed by: EMERGENCY MEDICINE

## 2024-03-12 PROCEDURE — 96376 TX/PRO/DX INJ SAME DRUG ADON: CPT | Performed by: EMERGENCY MEDICINE

## 2024-03-12 PROCEDURE — 96374 THER/PROPH/DIAG INJ IV PUSH: CPT | Performed by: EMERGENCY MEDICINE

## 2024-03-13 VITALS
OXYGEN SATURATION: 91 % | DIASTOLIC BLOOD PRESSURE: 74 MMHG | RESPIRATION RATE: 18 BRPM | TEMPERATURE: 98.4 F | SYSTOLIC BLOOD PRESSURE: 108 MMHG | HEART RATE: 91 BPM

## 2024-03-13 LAB
ALBUMIN SERPL BCG-MCNC: 4.2 G/DL (ref 3.5–5.2)
ALP SERPL-CCNC: 63 U/L (ref 40–150)
ALT SERPL W P-5'-P-CCNC: 42 U/L (ref 0–50)
ANION GAP SERPL CALCULATED.3IONS-SCNC: 9 MMOL/L (ref 7–15)
AST SERPL W P-5'-P-CCNC: 58 U/L (ref 0–45)
BASOPHILS # BLD AUTO: 0.2 10E3/UL (ref 0–0.2)
BASOPHILS NFR BLD AUTO: 2 %
BILIRUB SERPL-MCNC: 3.8 MG/DL
BUN SERPL-MCNC: 8 MG/DL (ref 6–20)
CALCIUM SERPL-MCNC: 8.5 MG/DL (ref 8.6–10)
CHLORIDE SERPL-SCNC: 105 MMOL/L (ref 98–107)
CREAT SERPL-MCNC: 0.5 MG/DL (ref 0.51–0.95)
DEPRECATED HCO3 PLAS-SCNC: 22 MMOL/L (ref 22–29)
EGFRCR SERPLBLD CKD-EPI 2021: >90 ML/MIN/1.73M2
EOSINOPHIL # BLD AUTO: 0.9 10E3/UL (ref 0–0.7)
EOSINOPHIL NFR BLD AUTO: 7 %
ERYTHROCYTE [DISTWIDTH] IN BLOOD BY AUTOMATED COUNT: 24.4 % (ref 10–15)
GLUCOSE SERPL-MCNC: 90 MG/DL (ref 70–99)
HCT VFR BLD AUTO: 18.8 % (ref 35–47)
HGB BLD-MCNC: 7 G/DL (ref 11.7–15.7)
IMM GRANULOCYTES # BLD: 0.1 10E3/UL
IMM GRANULOCYTES NFR BLD: 0 %
LYMPHOCYTES # BLD AUTO: 2.3 10E3/UL (ref 0.8–5.3)
LYMPHOCYTES NFR BLD AUTO: 17 %
MCH RBC QN AUTO: 32.1 PG (ref 26.5–33)
MCHC RBC AUTO-ENTMCNC: 37.2 G/DL (ref 31.5–36.5)
MCV RBC AUTO: 86 FL (ref 78–100)
MONOCYTES # BLD AUTO: 1.1 10E3/UL (ref 0–1.3)
MONOCYTES NFR BLD AUTO: 8 %
NEUTROPHILS # BLD AUTO: 8.9 10E3/UL (ref 1.6–8.3)
NEUTROPHILS NFR BLD AUTO: 66 %
NRBC # BLD AUTO: 0.3 10E3/UL
NRBC BLD AUTO-RTO: 2 /100
PLATELET # BLD AUTO: 386 10E3/UL (ref 150–450)
POTASSIUM SERPL-SCNC: 3.5 MMOL/L (ref 3.4–5.3)
PROT SERPL-MCNC: 6.8 G/DL (ref 6.4–8.3)
RBC # BLD AUTO: 2.18 10E6/UL (ref 3.8–5.2)
RETICS # AUTO: 0.5 10E6/UL (ref 0.03–0.1)
RETICS/RBC NFR AUTO: 23.1 % (ref 0.5–2)
SODIUM SERPL-SCNC: 136 MMOL/L (ref 135–145)
WBC # BLD AUTO: 13.5 10E3/UL (ref 4–11)

## 2024-03-13 PROCEDURE — 80053 COMPREHEN METABOLIC PANEL: CPT | Performed by: EMERGENCY MEDICINE

## 2024-03-13 PROCEDURE — 258N000003 HC RX IP 258 OP 636: Performed by: EMERGENCY MEDICINE

## 2024-03-13 PROCEDURE — 96375 TX/PRO/DX INJ NEW DRUG ADDON: CPT | Performed by: EMERGENCY MEDICINE

## 2024-03-13 PROCEDURE — 96361 HYDRATE IV INFUSION ADD-ON: CPT | Performed by: EMERGENCY MEDICINE

## 2024-03-13 PROCEDURE — 96374 THER/PROPH/DIAG INJ IV PUSH: CPT | Performed by: EMERGENCY MEDICINE

## 2024-03-13 PROCEDURE — 96376 TX/PRO/DX INJ SAME DRUG ADON: CPT | Performed by: EMERGENCY MEDICINE

## 2024-03-13 PROCEDURE — 36415 COLL VENOUS BLD VENIPUNCTURE: CPT | Performed by: EMERGENCY MEDICINE

## 2024-03-13 PROCEDURE — 85045 AUTOMATED RETICULOCYTE COUNT: CPT | Performed by: EMERGENCY MEDICINE

## 2024-03-13 PROCEDURE — 85025 COMPLETE CBC W/AUTO DIFF WBC: CPT | Performed by: EMERGENCY MEDICINE

## 2024-03-13 PROCEDURE — 250N000011 HC RX IP 250 OP 636: Performed by: EMERGENCY MEDICINE

## 2024-03-13 RX ORDER — HEPARIN SODIUM,PORCINE 10 UNIT/ML
5-10 VIAL (ML) INTRAVENOUS EVERY 24 HOURS
Status: DISCONTINUED | OUTPATIENT
Start: 2024-03-13 | End: 2024-03-13 | Stop reason: HOSPADM

## 2024-03-13 RX ORDER — KETOROLAC TROMETHAMINE 30 MG/ML
30 INJECTION, SOLUTION INTRAMUSCULAR; INTRAVENOUS ONCE
Status: COMPLETED | OUTPATIENT
Start: 2024-03-13 | End: 2024-03-13

## 2024-03-13 RX ORDER — ONDANSETRON 2 MG/ML
8 INJECTION INTRAMUSCULAR; INTRAVENOUS EVERY 6 HOURS PRN
Status: DISCONTINUED | OUTPATIENT
Start: 2024-03-13 | End: 2024-03-13 | Stop reason: HOSPADM

## 2024-03-13 RX ADMIN — HYDROMORPHONE HYDROCHLORIDE 1 MG: 1 INJECTION, SOLUTION INTRAMUSCULAR; INTRAVENOUS; SUBCUTANEOUS at 01:12

## 2024-03-13 RX ADMIN — SODIUM CHLORIDE, POTASSIUM CHLORIDE, SODIUM LACTATE AND CALCIUM CHLORIDE 1000 ML: 600; 310; 30; 20 INJECTION, SOLUTION INTRAVENOUS at 00:33

## 2024-03-13 RX ADMIN — HYDROMORPHONE HYDROCHLORIDE 1 MG: 1 INJECTION, SOLUTION INTRAMUSCULAR; INTRAVENOUS; SUBCUTANEOUS at 03:19

## 2024-03-13 RX ADMIN — HYDROMORPHONE HYDROCHLORIDE 1 MG: 1 INJECTION, SOLUTION INTRAMUSCULAR; INTRAVENOUS; SUBCUTANEOUS at 02:05

## 2024-03-13 RX ADMIN — ONDANSETRON 8 MG: 2 INJECTION INTRAMUSCULAR; INTRAVENOUS at 01:09

## 2024-03-13 RX ADMIN — HEPARIN, PORCINE (PF) 10 UNIT/ML INTRAVENOUS SYRINGE 5 ML: at 03:21

## 2024-03-13 RX ADMIN — KETOROLAC TROMETHAMINE 30 MG: 30 INJECTION, SOLUTION INTRAMUSCULAR at 00:33

## 2024-03-13 ASSESSMENT — ACTIVITIES OF DAILY LIVING (ADL)
ADLS_ACUITY_SCORE: 37

## 2024-03-13 NOTE — ED PROVIDER NOTES
[facility] EMERGENCY DEPARTMENT ([facility])    3/12/24      ED PROVIDER NOTE      History     Chief Complaint   Patient presents with    Sickle Cell Pain Crisis     The history is provided by the patient and medical records.     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  Patient states that she had been running errands with her mom yesterday, when she began to experience the onset of a sickle cell pain crisis.  She had taken her home medications (oxycodone and muscle relaxers) as well as a hot shower, but states that the pain has continued to worsen since.  Notes that the pain is the most severe along the sides of her abdomen.  Patient denies chest pain, fevers, shortness of breath, or urinary symptoms.  No recent injuries or falls.    Past Medical History  Past Medical History:   Diagnosis Date    Anxiety     Bleeding disorder (H24)     Blood clotting disorder (H24)     Cerebral infarction (H) 2015    Cognitive developmental delay     low IQ. 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 side (H)     right hand contractures    Iron overload due to repeated red blood cell transfusions     Migraines     Multiple subsegmental pulmonary emboli without acute cor pulmonale (H) 02/01/2021    Oppositional defiant behavior     Presence of intrauterine contraceptive device 2/18/2020    Superficial venous thrombosis of arm, right 03/25/2021    Uncomplicated asthma      Past Surgical History:   Procedure Laterality Date    AS INSERT TUNNELED CV 2 CATH W/O PORT/PUMP      CHOLECYSTECTOMY      CV RIGHT HEART  CATH MEASUREMENTS RECORDED N/A 11/18/2021    Procedure: Right Heart Cath;  Surgeon: Jackson Stauffer MD;  Location:  HEART CARDIAC CATH LAB    INSERT PORT VASCULAR ACCESS Left 4/21/2021    Procedure: INSERTION, VASCULAR ACCESS PORT (NOT SURE ON SIDE UNTIL REMOVAL);  Surgeon: Rajan More MD;  Location: [facility] OR    IR CHEST PORT PLACEMENT > 5 YRS OF AGE  4/21/2021    IR CVC NON TUNNEL LINE REMOVAL  5/6/2021    IR CVC NON TUNNEL PLACEMENT > 5 YRS  04/07/2020    IR CVC NON TUNNEL PLACEMENT > 5 YRS  4/30/2021    IR CVC NON TUNNEL PLACEMENT > 5 YRS  9/7/2022    IR PORT REMOVAL LEFT  4/21/2021    REMOVE PORT VASCULAR ACCESS Left 4/21/2021    Procedure: REMOVAL, VASCULAR ACCESS PORT LEFT;  Surgeon: Rajan More MD;  Location: [facility] OR    REPAIR TENDON ELBOW Right 10/02/2019    Procedure: Right Elbow Flexor Lengthening, Flexor Pronator Slide Of Wrist and Finger, Thumb Adductor Lengthening;  Surgeon: Anai Franco MD;  Location: [facility] OR    TONSILLECTOMY Bilateral 10/02/2019    Procedure: Bilateral Tonsillectomy;  Surgeon: Farhana Guy MD;  Location: [facility] OR    ZZC BREAST REDUCTION (INCLUDES LIPO) TIER 3 Bilateral 04/23/2019     aspirin (ASA) 81 MG chewable tablet  budesonide-formoterol (SYMBICORT) 160-4.5 MCG/ACT Inhaler  budesonide-formoterol (SYMBICORT) 160-4.5 MCG/ACT Inhaler  cetirizine (ZYRTEC) 10 MG tablet  deferasirox (JADENU) 360 MG tablet  Hydroxyurea 1000 MG TABS  methocarbamol (ROBAXIN) 750 MG tablet  omeprazole (PRILOSEC) 20 MG DR capsule  ondansetron (ZOFRAN) 8 MG tablet  oxyCODONE IR (ROXICODONE) 10 MG tablet  acetaminophen (TYLENOL) 325 MG tablet  albuterol (PROAIR HFA/PROVENTIL HFA/VENTOLIN HFA) 108 (90 Base) MCG/ACT inhaler  albuterol (PROVENTIL) (2.5 MG/3ML) 0.083% neb solution  diphenhydrAMINE (BENADRYL) 25 MG capsule  EPINEPHrine (ANY BX GENERIC EQUIV) 0.3 MG/0.3ML injection 2-pack  melatonin 5 MG tablet  naloxone (NARCAN) 4 MG/0.1ML nasal spray      Allergies   Allergen  Reactions    Contrast Dye      Hives and breathing issues    Fish-Derived Products Hives    Seafood Hives    Adhesive Tape Hives     Primipore dressing causes hives    Gadolinium     Iodinated Contrast Media      Family History  Family History   Problem Relation Age of Onset    Sickle Cell Trait Mother     Hypertension Mother     Asthma Mother     Sickle Cell Trait Father     Glaucoma No family hx of     Macular Degeneration No family hx of     Diabetes No family hx of     Gout No family hx of      Social History   Social History     Tobacco Use    Smoking status: Never     Passive exposure: Never    Smokeless tobacco: Never   Substance Use Topics    Alcohol use: Not Currently     Alcohol/week: 0.0 standard drinks of alcohol    Drug use: Never      Past medical history, past surgical history, medications, allergies, family history, and social history were reviewed with the patient. No additional pertinent items.          Physical Exam   BP: 130/71  Pulse: 101  Temp: 98.4  F (36.9  C)  Resp: 16  SpO2: 96 %  Physical Exam  Physical Exam   Constitutional: oriented to person, place, and time. appears well-developed and well-nourished.   HENT:   Head: Normocephalic and atraumatic.   Neck: Normal range of motion.   Pulmonary/Chest: Effort normal. No respiratory distress. No TTP over the chest wall  Cardiac: No murmurs, rubs, gallops. RRR.  Abdominal: Abdomen soft, nontender, nondistended. No rebound tenderness.  MSK: Long bones without deformity or evidence of trauma. No TTP over the T/L spine or paraspinus muscles.  Neurological: alert and oriented to person, place, and time.   Skin: Skin is warm and dry.   Psychiatric:  normal mood and affect.  behavior is normal. Thought content normal.       ED Course, Procedures, & Data    12:04 AM  The patient was seen and examined by Andrew Chou   in Room ED19.     Procedures            Results for orders placed or performed during the hospital encounter of 03/12/24    Comprehensive metabolic panel     Status: Abnormal   Result Value Ref Range    Sodium 136 135 - 145 mmol/L    Potassium 3.5 3.4 - 5.3 mmol/L    Carbon Dioxide (CO2) 22 22 - 29 mmol/L    Anion Gap 9 7 - 15 mmol/L    Urea Nitrogen 8.0 6.0 - 20.0 mg/dL    Creatinine 0.50 (L) 0.51 - 0.95 mg/dL    GFR Estimate >90 >60 mL/min/1.73m2    Calcium 8.5 (L) 8.6 - 10.0 mg/dL    Chloride 105 98 - 107 mmol/L    Glucose 90 70 - 99 mg/dL    Alkaline Phosphatase 63 40 - 150 U/L    AST 58 (H) 0 - 45 U/L    ALT 42 0 - 50 U/L    Protein Total 6.8 6.4 - 8.3 g/dL    Albumin 4.2 3.5 - 5.2 g/dL    Bilirubin Total 3.8 (H) <=1.2 mg/dL   Reticulocyte count     Status: Abnormal   Result Value Ref Range    % Reticulocyte 23.1 (H) 0.5 - 2.0 %    Absolute Reticulocyte 0.504 (H) 0.025 - 0.095 10e6/uL   CBC with platelets and differential     Status: Abnormal   Result Value Ref Range    WBC Count 13.5 (H) 4.0 - 11.0 10e3/uL    RBC Count 2.18 (L) 3.80 - 5.20 10e6/uL    Hemoglobin 7.0 (L) 11.7 - 15.7 g/dL    Hematocrit 18.8 (L) 35.0 - 47.0 %    MCV 86 78 - 100 fL    MCH 32.1 26.5 - 33.0 pg    MCHC 37.2 (H) 31.5 - 36.5 g/dL    RDW 24.4 (H) 10.0 - 15.0 %    Platelet Count 386 150 - 450 10e3/uL    % Neutrophils 66 %    % Lymphocytes 17 %    % Monocytes 8 %    % Eosinophils 7 %    % Basophils 2 %    % Immature Granulocytes 0 %    NRBCs per 100 WBC 2 (H) <1 /100    Absolute Neutrophils 8.9 (H) 1.6 - 8.3 10e3/uL    Absolute Lymphocytes 2.3 0.8 - 5.3 10e3/uL    Absolute Monocytes 1.1 0.0 - 1.3 10e3/uL    Absolute Eosinophils 0.9 (H) 0.0 - 0.7 10e3/uL    Absolute Basophils 0.2 0.0 - 0.2 10e3/uL    Absolute Immature Granulocytes 0.1 <=0.4 10e3/uL    Absolute NRBCs 0.3 10e3/uL   CBC with platelets differential     Status: Abnormal    Narrative    The following orders were created for panel order CBC with platelets differential.  Procedure                               Abnormality         Status                     ---------                                -----------         ------                     CBC with platelets and d...[[id number]]  Abnormal            Final result                 Please view results for these tests on the individual orders.     Medications - No data to display  Labs Ordered and Resulted from Time of ED Arrival to Time of ED Departure - No data to display  No orders to display          Critical care was not performed.     Medical Decision Making  The patient's presentation was of high complexity (an acute health issue posing potential threat to life or bodily function).    The patient's evaluation involved:  review of external note(s) from 1 sources (ED care plan)  strong consideration of a test (CXR) that was ultimately deferred  ordering and/or review of 3+ test(s) in this encounter (retake count, CBC, metabolic panel)    The patient's management necessitated high risk (a parenteral controlled substance).    Assessment & Plan    MDM  Patient presenting with sickle cell pain crisis mainly in her back.  No symptoms of acute chest.  She is not febrile.  She has no other symptoms of complication related to sickle cell such as hepatobiliary process, CVA, VTE.  She feels much better after her pain meds.  Hemoglobin stable.  White count mildly elevated but no signs of infection.  Metabolic panel otherwise less reassuring.  Patient discharged, return precautions given.  She agrees with this plan.    I have reviewed the nursing notes. I have reviewed the findings, diagnosis, plan and need for follow up with the patient.    New Prescriptions    No medications on file       Final diagnoses:   Sickle cell pain crisis (H)   IWillow, am serving as a trained medical scribe to document services personally performed by Andrew Chou MD, based on the provider's statements to me.     Andrew NEWMAN MD, was physically present and have reviewed and verified the accuracy of this note documented by Willow Martinez.      Andrew Chou MD  [facility]  [facility] EMERGENCY DEPARTMENT  3/12/2024     Andrew Chou MD  03/13/24 031

## 2024-03-13 NOTE — ED NOTES
AVS given and reviewed. Port de-accessed and locked with heparin prior to de-accessing. Pt discharged has got a taxi ride home.

## 2024-03-13 NOTE — DISCHARGE INSTRUCTIONS
Return emergency department if you develop worsening pain, chest pain, shortness of breath, fevers or if you have any further concerns.

## 2024-03-14 ENCOUNTER — NURSE TRIAGE (OUTPATIENT)
Dept: ONCOLOGY | Facility: CLINIC | Age: 24
End: 2024-03-14

## 2024-03-14 ENCOUNTER — ANCILLARY PROCEDURE (OUTPATIENT)
Dept: ULTRASOUND IMAGING | Facility: CLINIC | Age: 24
End: 2024-03-14
Attending: REGISTERED NURSE
Payer: COMMERCIAL

## 2024-03-14 ENCOUNTER — PATIENT OUTREACH (OUTPATIENT)
Dept: CARE COORDINATION | Facility: CLINIC | Age: 24
End: 2024-03-14

## 2024-03-14 DIAGNOSIS — M79.89 SWELLING OF RIGHT LOWER EXTREMITY: Primary | ICD-10-CM

## 2024-03-14 DIAGNOSIS — M79.89 SWELLING OF RIGHT LOWER EXTREMITY: ICD-10-CM

## 2024-03-14 DIAGNOSIS — D57.00 SICKLE CELL PAIN CRISIS (H): ICD-10-CM

## 2024-03-14 PROCEDURE — 93971 EXTREMITY STUDY: CPT | Mod: RT | Performed by: RADIOLOGY

## 2024-03-14 RX ORDER — OXYCODONE HYDROCHLORIDE 10 MG/1
10 TABLET ORAL EVERY 4 HOURS PRN
Qty: 20 TABLET | Refills: 0 | Status: SHIPPED | OUTPATIENT
Start: 2024-03-14 | End: 2024-03-21

## 2024-03-14 NOTE — TELEPHONE ENCOUNTER
Jennifer is calling to see if she can get into infusion today. Informed pt that we are still waiting for openings in infusion and we will call her if something becomes available.    Pt asked this writer to ask Patricia if Jennifer can be scheduled for an ultrasound. She bumped her leg against her mom's van. Hurts really bad.  R leg--below her knee on R leg on R side.   No pain in calf, just area around where hurt  Swollen,  Big dot.  Is worried about a blood clot.  Painful, sensitive to touch.  Numbness or tingling no worse than what residual from stroke.    0930: Secure chat sent to Patricia Mantilla for advise about ultrasound.

## 2024-03-14 NOTE — TELEPHONE ENCOUNTER
Received a call from Jennifer regarding a ride to her US apt.  US apt has not been scheduled yet.  SW not aware she needed a ride. Informed SW that pt will need transportation when US apt is scheduled.   Informed pt that this writer will call her back when we get clarification about US apt.

## 2024-03-14 NOTE — PROGRESS NOTES
Social Work - Transportation  [facility]    Data/Intervention:  Patient Name: Jennifer Cervantes Goes By: Jennifer    /Age: 2000 (24 year old)    Referral From: [facility]  Reason for Referral:  support requested for patient transportation needs for today's appointment.  Assessment:  called [employer] to arrange ride through patient's insurance. [employer] arranged  for patient from home with [employer]. Patient will need to call [phone] when ready for return ride home.  Plan: Patient is aware of the transportation plan.  available to assist with any other needs.    CARLOS Chavez,LGUDAY  Hematology/Oncology Social Worker  Phone:[phone] Pager: [phone]

## 2024-03-14 NOTE — TELEPHONE ENCOUNTER
Oncology Nurse Triage - Sickle Cell Pain Crisis:    Situation: Jennifer  calling about Sickle Cell Pain Crisis, requesting to be added on for IV fluids and pain medicine    Background:     Patient's last infusion was 3/11/2024, was in ED on 3/12/2024  Last clinic visit date:2/23/2024 Patricia Mantilla  Does patient have active treatment plan?  Yes      Assessment of Symptoms:  Onset/Duration of symptoms: 1 day    Is it typical sickle cell pain? Yes   Location: legs hurting  Character: Burning           Intensity: 9/10    Any radiation of pain, numbness, tingling, weakness, warmth, swelling, discoloration of arms or legs?No     Fever?No  Chest Pain Present: No     Shortness of breath: No     Other home therapies tried: HEAT/HEATING PAD and WARM BATH     Last home medication taken and when: Oxycodone 6:00am     Any Refills Needed?: Yes  Oxycodone  [address] [facility]    Does patient have transportation & length of time to get to clinic: No         Recommendations:   0725 Per Patricia Mantilla CNP approved for IVF/Pain per therapy plan.     If you do not hear from the infusion center by 2pm then you will not be able to get in for an infusion today. If symptoms worsen while waiting for call back, and/or you experience fever, chills, SOB, chest pain, cough, n/v, dizziness, numbness, swelling, discoloration of extremities, then seek emergency evaluation in Emergency Department.     Please note, if you are late for your appt, you risk losing your infusion appt as it may delay another patient's infusion who arrived on time.

## 2024-03-14 NOTE — TELEPHONE ENCOUNTER
1207 This writer spoke to Jennifer discussed options as of this time no openings for today for IVF/pain appt, pt has option to get US done today then call back tomorrow for IVF/Pain appt or pt could schedule US tomorrow and call back in morning for IVF/Pain appt to try and coordinate transportation needs for tomorrow 3/15 in one day?   Jennifer in agreement to schedule US for tomorrow 3/15 in hopes it will coordinate with an IVF/Pain appt tomorrow.     1310 Pt changed mind and scheduled US for later this afternoon and 4:10pm. SW assisting pt with transportation.

## 2024-03-14 NOTE — TELEPHONE ENCOUNTER
Narcotic Refill Request    Medication(s) requested:  oxycodone  Person Requesting Refill:Jennifer (pt)  What pain is the medication treating: sickle cell crisis pain  How is the medication being taken?:1 tablet every 4hours with max 6 per day  Does pt have enough for today? no  Is pain being adequately controlled on the current regimen?: ok, requires intermittent IVF/Pain during week  Experiencing any side effects from medication?: denies    Date of most recent appointment:  2/23/2024 Patricia Mantilla CNP  Any No Show Visits:none recently  Next appointment:   3/18/2024   Last fill date and by whom:  3/7/2024 Patricia Mantilla CNP   Reviewed: not an agent    Send to provider: route to last provider John Mcgee

## 2024-03-14 NOTE — TELEPHONE ENCOUNTER
9:45am Per Patricia Mantilla CNP order for Ultrasound of Right Lower Extremity     1021 Updated Jennifer Billy on Plan for US or Right Lower Extremity either today or tomorrow and still waiting for infusion appts at [facility] today.

## 2024-03-15 ENCOUNTER — PATIENT OUTREACH (OUTPATIENT)
Dept: CARE COORDINATION | Facility: CLINIC | Age: 24
End: 2024-03-15

## 2024-03-15 ENCOUNTER — INFUSION THERAPY VISIT (OUTPATIENT)
Dept: TRANSPLANT | Facility: CLINIC | Age: 24
End: 2024-03-15
Attending: PEDIATRICS
Payer: COMMERCIAL

## 2024-03-15 ENCOUNTER — TELEPHONE (OUTPATIENT)
Dept: INTERNAL MEDICINE | Facility: CLINIC | Age: 24
End: 2024-03-15
Payer: COMMERCIAL

## 2024-03-15 ENCOUNTER — NURSE TRIAGE (OUTPATIENT)
Dept: ONCOLOGY | Facility: CLINIC | Age: 24
End: 2024-03-15
Payer: COMMERCIAL

## 2024-03-15 VITALS
RESPIRATION RATE: 18 BRPM | SYSTOLIC BLOOD PRESSURE: 122 MMHG | HEART RATE: 82 BPM | DIASTOLIC BLOOD PRESSURE: 68 MMHG | OXYGEN SATURATION: 89 %

## 2024-03-15 DIAGNOSIS — G81.10 SPASTIC HEMIPLEGIA, UNSPECIFIED ETIOLOGY, UNSPECIFIED LATERALITY (H): ICD-10-CM

## 2024-03-15 DIAGNOSIS — D57.00 SICKLE CELL PAIN CRISIS (H): Primary | ICD-10-CM

## 2024-03-15 PROCEDURE — 250N000013 HC RX MED GY IP 250 OP 250 PS 637: Performed by: PEDIATRICS

## 2024-03-15 PROCEDURE — 96361 HYDRATE IV INFUSION ADD-ON: CPT

## 2024-03-15 PROCEDURE — 96374 THER/PROPH/DIAG INJ IV PUSH: CPT

## 2024-03-15 PROCEDURE — 96375 TX/PRO/DX INJ NEW DRUG ADDON: CPT

## 2024-03-15 PROCEDURE — 250N000011 HC RX IP 250 OP 636: Performed by: PEDIATRICS

## 2024-03-15 PROCEDURE — 258N000003 HC RX IP 258 OP 636: Performed by: PEDIATRICS

## 2024-03-15 PROCEDURE — 96376 TX/PRO/DX INJ SAME DRUG ADON: CPT

## 2024-03-15 RX ORDER — ONDANSETRON 4 MG/1
8 TABLET, FILM COATED ORAL
Status: CANCELLED
Start: 2024-04-01

## 2024-03-15 RX ORDER — HEPARIN SODIUM,PORCINE 10 UNIT/ML
5 VIAL (ML) INTRAVENOUS
Status: CANCELLED | OUTPATIENT
Start: 2024-04-01

## 2024-03-15 RX ORDER — ONDANSETRON 2 MG/ML
8 INJECTION INTRAMUSCULAR; INTRAVENOUS EVERY 6 HOURS PRN
Status: DISCONTINUED | OUTPATIENT
Start: 2024-03-15 | End: 2024-03-15 | Stop reason: HOSPADM

## 2024-03-15 RX ORDER — ONDANSETRON 2 MG/ML
8 INJECTION INTRAMUSCULAR; INTRAVENOUS EVERY 6 HOURS PRN
Status: CANCELLED
Start: 2024-04-01

## 2024-03-15 RX ORDER — HEPARIN SODIUM (PORCINE) LOCK FLUSH IV SOLN 100 UNIT/ML 100 UNIT/ML
5 SOLUTION INTRAVENOUS
Status: CANCELLED | OUTPATIENT
Start: 2024-04-01

## 2024-03-15 RX ORDER — KETOROLAC TROMETHAMINE 30 MG/ML
30 INJECTION, SOLUTION INTRAMUSCULAR; INTRAVENOUS ONCE
Status: CANCELLED
Start: 2024-04-01 | End: 2024-04-01

## 2024-03-15 RX ORDER — DIPHENHYDRAMINE HCL 25 MG
50 CAPSULE ORAL
Status: CANCELLED
Start: 2024-04-01

## 2024-03-15 RX ORDER — DIPHENHYDRAMINE HCL 25 MG
50 CAPSULE ORAL
Status: COMPLETED | OUTPATIENT
Start: 2024-03-15 | End: 2024-03-15

## 2024-03-15 RX ADMIN — SODIUM CHLORIDE, POTASSIUM CHLORIDE, SODIUM LACTATE AND CALCIUM CHLORIDE 1000 ML: 600; 310; 30; 20 INJECTION, SOLUTION INTRAVENOUS at 12:01

## 2024-03-15 RX ADMIN — DIPHENHYDRAMINE HYDROCHLORIDE 50 MG: 25 CAPSULE ORAL at 11:57

## 2024-03-15 RX ADMIN — HYDROMORPHONE HYDROCHLORIDE 1 MG: 1 INJECTION, SOLUTION INTRAMUSCULAR; INTRAVENOUS; SUBCUTANEOUS at 14:10

## 2024-03-15 RX ADMIN — ONDANSETRON 8 MG: 2 INJECTION INTRAMUSCULAR; INTRAVENOUS at 11:56

## 2024-03-15 RX ADMIN — HYDROMORPHONE HYDROCHLORIDE 1 MG: 1 INJECTION, SOLUTION INTRAMUSCULAR; INTRAVENOUS; SUBCUTANEOUS at 13:04

## 2024-03-15 RX ADMIN — HYDROMORPHONE HYDROCHLORIDE 1 MG: 1 INJECTION, SOLUTION INTRAMUSCULAR; INTRAVENOUS; SUBCUTANEOUS at 11:56

## 2024-03-15 ASSESSMENT — PAIN SCALES - GENERAL: PAINLEVEL: EXTREME PAIN (9)

## 2024-03-15 NOTE — TELEPHONE ENCOUNTER
BMT has noon availability     Per Jennifer she can take that appt.     Message sent to SW for transportation to and from appt.     Message sent to [facility] to schedule.

## 2024-03-15 NOTE — PROGRESS NOTES
Social Work - Transportation  [facility]    Data/Intervention:  Patient Name: Jennifer Cervantes Goes By: Jennifer    /Age: 2000 (24 year old)    Referral From: transportation  Reason for Referral:  support requested for patient transportation needs for infusion appointment on 3/15/24.  Assessment:  called [employer] to arrange ride through patient's insurance. [employer] arranged  for patient from home with [employer]. Patient will need to call when ready for return ride home.  Plan: Patient is aware of the transportation plan.  available to assist with any other needs.    Corry GONZALEZ, [facility]  - Oncology  Phone : [phone]  Pager: [phone]

## 2024-03-15 NOTE — PROGRESS NOTES
Infusion Nursing Note:  Jennifer Cervantes presents today for add on IVF and pain meds.    Patient seen by provider today: No   present during visit today: Not Applicable.    Note: No labs/no provider visit today. Given 1L LR bolus over 2 hours, 8mg IVP Zofran and 50mg PO Benadryl. Given 1mg IVP Dilaudid Q1 HR for max of 3 doses. Patient stated pain improved to 5/10 upon discharge.    Intravenous Access:  Implanted Port.  +BR noted pre and post infusion  De-accessed prior to discharge.    Treatment Conditions:  Not Applicable.      Post Infusion Assessment:  Patient tolerated infusion without incident.       Discharge Plan:   Patient discharged in stable condition accompanied by: self.      Allison Wiggins RN

## 2024-03-15 NOTE — TELEPHONE ENCOUNTER
Oncology Nurse Triage - Sickle Cell Pain Crisis:     Situation: Jennifer  calling about Sickle Cell Pain Crisis, requesting to be added on for IV fluids and pain medicine     Background:      Patient's last infusion was 3/11/2024, was in ED on 3/12/2024  Last clinic visit date:2/23/2024 Patricia Mantilla  Does patient have active treatment plan?  Yes        Assessment of Symptoms:  Onset/Duration of symptoms: 2day     Is it typical sickle cell pain? Yes   Location: legs hurting lower back  Character: Burning           Intensity: 10/10     Any radiation of pain, numbness, tingling, weakness, warmth, swelling, discoloration of arms or legs?No      Fever?No    Chest Pain Present: No      Shortness of breath: No      Other home therapies tried: HEAT/HEATING PAD and WARM BATH      Last home medication taken and when: Oxycodone 6:00am      Any Refills Needed?: no     Does patient have transportation & length of time to get to clinic: No         Recommendations:    5377 Paged Patricia mantilla  7108 Per Patricia vargas for IVF/Pain per plan    If you do not hear from the infusion center by 2pm then you will not be able to get in for an infusion today. If symptoms worsen while waiting for call back, and/or you experience fever, chills, SOB, chest pain, cough, n/v, dizziness, numbness, swelling, discoloration of extremities, then seek emergency evaluation in Emergency Department.      Please note, if you are late for your appt, you risk losing your infusion appt as it may delay another patient's infusion who arrived on time.

## 2024-03-15 NOTE — TELEPHONE ENCOUNTER
Narcan dispensed at pharmacy per Opiate Antagonist Protocol and Collaborative Practice Agreement.    Patient requested Narcan for:  own use in the event of an opioid overdose.    Screening Questions:  I currently use or have a history of using opioids.  Yes   I am in contact with a person who has a history of using opioids.  No  The person to whom Narcan will be administered has an allergy to naloxone.  No      Randolph Pérez, Pharm.D.  [facility]  [phone]

## 2024-03-17 ENCOUNTER — HOSPITAL ENCOUNTER (EMERGENCY)
Facility: CLINIC | Age: 24
Discharge: HOME | End: 2024-03-17
Attending: EMERGENCY MEDICINE | Admitting: EMERGENCY MEDICINE
Payer: COMMERCIAL

## 2024-03-17 ENCOUNTER — APPOINTMENT (OUTPATIENT)
Dept: GENERAL RADIOLOGY | Facility: CLINIC | Age: 24
End: 2024-03-17
Attending: EMERGENCY MEDICINE
Payer: COMMERCIAL

## 2024-03-17 VITALS
HEART RATE: 76 BPM | SYSTOLIC BLOOD PRESSURE: 130 MMHG | RESPIRATION RATE: 16 BRPM | DIASTOLIC BLOOD PRESSURE: 68 MMHG | TEMPERATURE: 97.9 F | WEIGHT: 155 LBS | BODY MASS INDEX: 26.46 KG/M2 | OXYGEN SATURATION: 95 % | HEIGHT: 64 IN

## 2024-03-17 DIAGNOSIS — D57.00 SICKLE CELL PAIN CRISIS (H): ICD-10-CM

## 2024-03-17 LAB
ALBUMIN SERPL BCG-MCNC: 4.3 G/DL (ref 3.5–5.2)
ALP SERPL-CCNC: 66 U/L (ref 40–150)
ALT SERPL W P-5'-P-CCNC: 49 U/L (ref 0–50)
ANION GAP SERPL CALCULATED.3IONS-SCNC: 11 MMOL/L (ref 7–15)
AST SERPL W P-5'-P-CCNC: 71 U/L (ref 0–45)
ATRIAL RATE - MUSE: 87 BPM
BASOPHILS # BLD AUTO: ABNORMAL 10*3/UL
BASOPHILS # BLD MANUAL: 0.1 10E3/UL (ref 0–0.2)
BASOPHILS NFR BLD AUTO: ABNORMAL %
BASOPHILS NFR BLD MANUAL: 1 %
BILIRUB SERPL-MCNC: 4.5 MG/DL
BUN SERPL-MCNC: 6.1 MG/DL (ref 6–20)
CALCIUM SERPL-MCNC: 8.5 MG/DL (ref 8.6–10)
CHLORIDE SERPL-SCNC: 104 MMOL/L (ref 98–107)
CREAT SERPL-MCNC: 0.48 MG/DL (ref 0.51–0.95)
DEPRECATED HCO3 PLAS-SCNC: 22 MMOL/L (ref 22–29)
DIASTOLIC BLOOD PRESSURE - MUSE: NORMAL MMHG
EGFRCR SERPLBLD CKD-EPI 2021: >90 ML/MIN/1.73M2
EOSINOPHIL # BLD AUTO: ABNORMAL 10*3/UL
EOSINOPHIL # BLD MANUAL: 0.3 10E3/UL (ref 0–0.7)
EOSINOPHIL NFR BLD AUTO: ABNORMAL %
EOSINOPHIL NFR BLD MANUAL: 2 %
ERYTHROCYTE [DISTWIDTH] IN BLOOD BY AUTOMATED COUNT: 28.6 % (ref 10–15)
FRAGMENTS BLD QL SMEAR: ABNORMAL
GLUCOSE SERPL-MCNC: 86 MG/DL (ref 70–99)
HCT VFR BLD AUTO: 19.4 % (ref 35–47)
HGB BLD-MCNC: 7 G/DL (ref 11.7–15.7)
IMM GRANULOCYTES # BLD: ABNORMAL 10*3/UL
IMM GRANULOCYTES NFR BLD: ABNORMAL %
INTERPRETATION ECG - MUSE: NORMAL
LYMPHOCYTES # BLD AUTO: ABNORMAL 10*3/UL
LYMPHOCYTES # BLD MANUAL: 4.3 10E3/UL (ref 0.8–5.3)
LYMPHOCYTES NFR BLD AUTO: ABNORMAL %
LYMPHOCYTES NFR BLD MANUAL: 29 %
MCH RBC QN AUTO: 32.6 PG (ref 26.5–33)
MCHC RBC AUTO-ENTMCNC: 36.1 G/DL (ref 31.5–36.5)
MCV RBC AUTO: 90 FL (ref 78–100)
MONOCYTES # BLD AUTO: ABNORMAL 10*3/UL
MONOCYTES # BLD MANUAL: 1 10E3/UL (ref 0–1.3)
MONOCYTES NFR BLD AUTO: ABNORMAL %
MONOCYTES NFR BLD MANUAL: 7 %
NEUTROPHILS # BLD AUTO: ABNORMAL 10*3/UL
NEUTROPHILS # BLD MANUAL: 9 10E3/UL (ref 1.6–8.3)
NEUTROPHILS NFR BLD AUTO: ABNORMAL %
NEUTROPHILS NFR BLD MANUAL: 61 %
NRBC # BLD AUTO: 1.5 10E3/UL
NRBC # BLD AUTO: 1.6 10E3/UL
NRBC BLD AUTO-RTO: 11 /100
NRBC BLD MANUAL-RTO: 10 %
P AXIS - MUSE: 69 DEGREES
PLAT MORPH BLD: ABNORMAL
PLATELET # BLD AUTO: 327 10E3/UL (ref 150–450)
POTASSIUM SERPL-SCNC: 3.8 MMOL/L (ref 3.4–5.3)
PR INTERVAL - MUSE: 166 MS
PROT SERPL-MCNC: 6.9 G/DL (ref 6.4–8.3)
QRS DURATION - MUSE: 74 MS
QT - MUSE: 396 MS
QTC - MUSE: 476 MS
R AXIS - MUSE: 44 DEGREES
RBC # BLD AUTO: 2.15 10E6/UL (ref 3.8–5.2)
RBC MORPH BLD: ABNORMAL
RETICS # AUTO: 0.67 10E6/UL (ref 0.03–0.1)
RETICS/RBC NFR AUTO: >30 % (ref 0.5–2)
SICKLE CELLS BLD QL SMEAR: ABNORMAL
SODIUM SERPL-SCNC: 137 MMOL/L (ref 135–145)
SYSTOLIC BLOOD PRESSURE - MUSE: NORMAL MMHG
T AXIS - MUSE: 34 DEGREES
TARGETS BLD QL SMEAR: SLIGHT
TROPONIN T SERPL HS-MCNC: <6 NG/L
VENTRICULAR RATE- MUSE: 87 BPM
WBC # BLD AUTO: 14.7 10E3/UL (ref 4–11)

## 2024-03-17 PROCEDURE — 96360 HYDRATION IV INFUSION INIT: CPT | Performed by: EMERGENCY MEDICINE

## 2024-03-17 PROCEDURE — 93010 ELECTROCARDIOGRAM REPORT: CPT | Performed by: EMERGENCY MEDICINE

## 2024-03-17 PROCEDURE — 93005 ELECTROCARDIOGRAM TRACING: CPT | Performed by: EMERGENCY MEDICINE

## 2024-03-17 PROCEDURE — 80053 COMPREHEN METABOLIC PANEL: CPT | Performed by: EMERGENCY MEDICINE

## 2024-03-17 PROCEDURE — 96374 THER/PROPH/DIAG INJ IV PUSH: CPT | Performed by: EMERGENCY MEDICINE

## 2024-03-17 PROCEDURE — 85048 AUTOMATED LEUKOCYTE COUNT: CPT | Performed by: EMERGENCY MEDICINE

## 2024-03-17 PROCEDURE — 250N000011 HC RX IP 250 OP 636: Performed by: EMERGENCY MEDICINE

## 2024-03-17 PROCEDURE — 96375 TX/PRO/DX INJ NEW DRUG ADDON: CPT | Performed by: EMERGENCY MEDICINE

## 2024-03-17 PROCEDURE — 96376 TX/PRO/DX INJ SAME DRUG ADON: CPT | Performed by: EMERGENCY MEDICINE

## 2024-03-17 PROCEDURE — 71046 X-RAY EXAM CHEST 2 VIEWS: CPT

## 2024-03-17 PROCEDURE — 85007 BL SMEAR W/DIFF WBC COUNT: CPT | Performed by: EMERGENCY MEDICINE

## 2024-03-17 PROCEDURE — 84484 ASSAY OF TROPONIN QUANT: CPT | Performed by: EMERGENCY MEDICINE

## 2024-03-17 PROCEDURE — 96361 HYDRATE IV INFUSION ADD-ON: CPT | Performed by: EMERGENCY MEDICINE

## 2024-03-17 PROCEDURE — 99285 EMERGENCY DEPT VISIT HI MDM: CPT | Mod: 25 | Performed by: EMERGENCY MEDICINE

## 2024-03-17 PROCEDURE — 36591 DRAW BLOOD OFF VENOUS DEVICE: CPT | Performed by: EMERGENCY MEDICINE

## 2024-03-17 PROCEDURE — 85045 AUTOMATED RETICULOCYTE COUNT: CPT | Performed by: EMERGENCY MEDICINE

## 2024-03-17 PROCEDURE — 258N000003 HC RX IP 258 OP 636: Performed by: EMERGENCY MEDICINE

## 2024-03-17 RX ORDER — HEPARIN SODIUM,PORCINE 10 UNIT/ML
5-10 VIAL (ML) INTRAVENOUS
Status: DISCONTINUED | OUTPATIENT
Start: 2024-03-17 | End: 2024-03-17 | Stop reason: HOSPADM

## 2024-03-17 RX ORDER — HEPARIN SODIUM (PORCINE) LOCK FLUSH IV SOLN 100 UNIT/ML 100 UNIT/ML
5-10 SOLUTION INTRAVENOUS
Status: DISCONTINUED | OUTPATIENT
Start: 2024-03-17 | End: 2024-03-17 | Stop reason: HOSPADM

## 2024-03-17 RX ORDER — HEPARIN SODIUM,PORCINE 10 UNIT/ML
5-10 VIAL (ML) INTRAVENOUS EVERY 24 HOURS
Status: DISCONTINUED | OUTPATIENT
Start: 2024-03-17 | End: 2024-03-17 | Stop reason: HOSPADM

## 2024-03-17 RX ORDER — KETOROLAC TROMETHAMINE 15 MG/ML
15 INJECTION, SOLUTION INTRAMUSCULAR; INTRAVENOUS ONCE
Status: COMPLETED | OUTPATIENT
Start: 2024-03-17 | End: 2024-03-17

## 2024-03-17 RX ORDER — ONDANSETRON 2 MG/ML
8 INJECTION INTRAMUSCULAR; INTRAVENOUS
Status: COMPLETED | OUTPATIENT
Start: 2024-03-17 | End: 2024-03-17

## 2024-03-17 RX ORDER — SODIUM CHLORIDE, SODIUM LACTATE, POTASSIUM CHLORIDE, CALCIUM CHLORIDE 600; 310; 30; 20 MG/100ML; MG/100ML; MG/100ML; MG/100ML
INJECTION, SOLUTION INTRAVENOUS CONTINUOUS
Status: DISCONTINUED | OUTPATIENT
Start: 2024-03-17 | End: 2024-03-17 | Stop reason: HOSPADM

## 2024-03-17 RX ADMIN — HEPARIN, PORCINE (PF) 10 UNIT/ML INTRAVENOUS SYRINGE 5 ML: at 08:13

## 2024-03-17 RX ADMIN — HYDROMORPHONE HYDROCHLORIDE 1 MG: 1 INJECTION, SOLUTION INTRAMUSCULAR; INTRAVENOUS; SUBCUTANEOUS at 04:09

## 2024-03-17 RX ADMIN — HYDROMORPHONE HYDROCHLORIDE 1 MG: 1 INJECTION, SOLUTION INTRAMUSCULAR; INTRAVENOUS; SUBCUTANEOUS at 05:18

## 2024-03-17 RX ADMIN — HYDROMORPHONE HYDROCHLORIDE 1 MG: 1 INJECTION, SOLUTION INTRAMUSCULAR; INTRAVENOUS; SUBCUTANEOUS at 06:36

## 2024-03-17 RX ADMIN — SODIUM CHLORIDE, POTASSIUM CHLORIDE, SODIUM LACTATE AND CALCIUM CHLORIDE: 600; 310; 30; 20 INJECTION, SOLUTION INTRAVENOUS at 04:09

## 2024-03-17 RX ADMIN — ONDANSETRON 8 MG: 2 INJECTION INTRAMUSCULAR; INTRAVENOUS at 04:09

## 2024-03-17 ASSESSMENT — ACTIVITIES OF DAILY LIVING (ADL)
ADLS_ACUITY_SCORE: 37
ADLS_ACUITY_SCORE: 37
ADLS_ACUITY_SCORE: 35
ADLS_ACUITY_SCORE: 37
ADLS_ACUITY_SCORE: 35
ADLS_ACUITY_SCORE: 35
ADLS_ACUITY_SCORE: 37
ADLS_ACUITY_SCORE: 35

## 2024-03-17 NOTE — ED TRIAGE NOTES
C/o pain in chest and lower back. Pt states this started at 2000. Pt used home oxycodone 10mg and flexeril. Denies recent illness.      Triage Assessment (Adult)       Row Name 03/17/24 0055          Triage Assessment    Airway WDL WDL        Respiratory WDL    Respiratory WDL WDL        Skin Circulation/Temperature WDL    Skin Circulation/Temperature WDL WDL        Cardiac WDL    Cardiac WDL WDL        Peripheral/Neurovascular WDL    Peripheral Neurovascular WDL WDL        Cognitive/Neuro/Behavioral WDL    Cognitive/Neuro/Behavioral WDL WDL

## 2024-03-17 NOTE — ED PROVIDER NOTES
[facility] [place] EMERGENCY DEPARTMENT ([facility])    3/17/24      ED PROVIDER NOTE  History     Chief Complaint   Patient presents with    Sickle Cell Pain Crisis     C/o pain in chest and lower back. Pt states this started at 2000. Pt used home oxycodone 10mg and flexeril.      HPI  Jennifer Cervantes is a 24 year old female with PMH of sickle cell disease who presents to the ED with sickle cell pain crisis. She reports back and chest pain. She states she was already having this pain and it worsened tonight around 8pm. At this time she used her home 10 mg oxycodone and flexeril, but this did not improve symptoms, prompting ED visit. She describes the pain as tightness in her chest and sharp pain in her back. She states this felt like her typical sickle cell pain and is just looking for pain relief. No recent infectious symptoms. No N/V. Some F/C. No known sick contacts.     Past Medical History  Past Medical History:   Diagnosis Date    Anxiety     Bleeding disorder (H24)     Blood clotting disorder (H24)     Cerebral infarction (H) 2015    Cognitive developmental delay     low IQ. 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 side (H)     right hand contractures    Iron overload due to repeated red blood cell transfusions     Migraines     Multiple subsegmental pulmonary emboli without acute cor pulmonale (H) 02/01/2021    Oppositional defiant behavior     Presence of intrauterine contraceptive device 2/18/2020    Superficial venous thrombosis of arm, right 03/25/2021    Uncomplicated asthma      Past Surgical History:   Procedure Laterality Date    AS INSERT TUNNELED CV 2 CATH W/O PORT/PUMP      CHOLECYSTECTOMY      CV RIGHT HEART CATH MEASUREMENTS RECORDED N/A 11/18/2021    Procedure: Right Heart Cath;  Surgeon: Jackson Stauffer MD;  Location:  HEART CARDIAC CATH LAB    INSERT PORT  VASCULAR ACCESS Left 4/21/2021    Procedure: INSERTION, VASCULAR ACCESS PORT (NOT SURE ON SIDE UNTIL REMOVAL);  Surgeon: Rajan More MD;  Location: [facility] OR    IR CHEST PORT PLACEMENT > 5 YRS OF AGE  4/21/2021    IR CVC NON TUNNEL LINE REMOVAL  5/6/2021    IR CVC NON TUNNEL PLACEMENT > 5 YRS  04/07/2020    IR CVC NON TUNNEL PLACEMENT > 5 YRS  4/30/2021    IR CVC NON TUNNEL PLACEMENT > 5 YRS  9/7/2022    IR PORT REMOVAL LEFT  4/21/2021    REMOVE PORT VASCULAR ACCESS Left 4/21/2021    Procedure: REMOVAL, VASCULAR ACCESS PORT LEFT;  Surgeon: Rajan More MD;  Location: [facility] OR    REPAIR TENDON ELBOW Right 10/02/2019    Procedure: Right Elbow Flexor Lengthening, Flexor Pronator Slide Of Wrist and Finger, Thumb Adductor Lengthening;  Surgeon: Anai Franco MD;  Location: [facility] OR    TONSILLECTOMY Bilateral 10/02/2019    Procedure: Bilateral Tonsillectomy;  Surgeon: Farhana Guy MD;  Location: [facility] OR    ZZC BREAST REDUCTION (INCLUDES LIPO) TIER 3 Bilateral 04/23/2019     acetaminophen (TYLENOL) 325 MG tablet  albuterol (PROAIR HFA/PROVENTIL HFA/VENTOLIN HFA) 108 (90 Base) MCG/ACT inhaler  albuterol (PROVENTIL) (2.5 MG/3ML) 0.083% neb solution  aspirin (ASA) 81 MG chewable tablet  budesonide-formoterol (SYMBICORT) 160-4.5 MCG/ACT Inhaler  budesonide-formoterol (SYMBICORT) 160-4.5 MCG/ACT Inhaler  cetirizine (ZYRTEC) 10 MG tablet  deferasirox (JADENU) 360 MG tablet  diphenhydrAMINE (BENADRYL) 25 MG capsule  EPINEPHrine (ANY BX GENERIC EQUIV) 0.3 MG/0.3ML injection 2-pack  Hydroxyurea 1000 MG TABS  melatonin 5 MG tablet  methocarbamol (ROBAXIN) 750 MG tablet  naloxone (NARCAN) 4 MG/0.1ML nasal spray  naloxone (NARCAN) 4 MG/0.1ML nasal spray  omeprazole (PRILOSEC) 20 MG DR capsule  ondansetron (ZOFRAN) 8 MG tablet  oxyCODONE IR (ROXICODONE) 10 MG tablet      Allergies   Allergen Reactions    Contrast Dye      Hives and breathing issues    Fish-Derived Products Hives    Seafood Hives    Adhesive Tape  "Hives     Primipore dressing causes hives    Gadolinium     Iodinated Contrast Media      Family History  Family History   Problem Relation Age of Onset    Sickle Cell Trait Mother     Hypertension Mother     Asthma Mother     Sickle Cell Trait Father     Glaucoma No family hx of     Macular Degeneration No family hx of     Diabetes No family hx of     Gout No family hx of      Social History   Social History     Tobacco Use    Smoking status: Never     Passive exposure: Never    Smokeless tobacco: Never   Substance Use Topics    Alcohol use: Not Currently     Alcohol/week: 0.0 standard drinks of alcohol    Drug use: Never      Past medical history, past surgical history, medications, allergies, family history, and social history were reviewed with the patient. No additional pertinent items.      A complete review of systems was performed with pertinent positives and negatives noted in the HPI, and all other systems negative.    Physical Exam   BP: 122/77  Pulse: 87  Temp: 98.1  F (36.7  C)  Resp: 16  Height: 162.6 cm (5' 4\")  Weight: 70.3 kg (155 lb)  SpO2: 95 %  Physical Exam  General: Afebrile, no acute distress   HEENT: Normocephalic, atraumatic, conjunctivae normal. MMM  Neck: non-tender, supple  Cardio: regular rate. regular rhythm   Resp: Normal work of breathing, no respiratory distress, lungs clear bilaterally, no wheezing, rhonchi, rales  Chest/Back: no visual signs of trauma, no CVA tenderness   Abdomen: soft, non distension, no tenderness, no peritoneal signs   Neuro: alert and fully oriented. CN II-XII grossly intact. Grossly normal strength and sensation in all extremities.   MSK: no deformities. Normal range of motion  Integumentary/Skin: no rash visualized, normal color  Psych: normal affect, normal behavior    ED Course, Procedures, & Data      Procedures         EKG Interpretation:      Interpreted by Jamee Martin MD  Time reviewed: 0243  Symptoms at time of EKG: chest pain   Rhythm: normal " sinus   Rate: normal  Axis: normal  Ectopy: none  Conduction: normal  ST Segments/ T Waves: No acute ischemic change  Q Waves: none  Comparison to prior: Unchanged    Clinical Impression: normal EKG          Results for orders placed or performed during the hospital encounter of 03/17/24   XR Chest 2 Views     Status: None    Narrative    EXAM: XR CHEST 2 VIEWS  LOCATION: [facility] [place]  DATE: 3/17/2024    INDICATION: chest pain, history of sickle cell disease  COMPARISON: 03/08/2024      Impression    IMPRESSION: No focal airspace opacity, pleural effusion or pneumothorax. Heart size and pulmonary vascularity are normal. Mild nonspecific elevation of the right hemidiaphragm is unchanged. Left chest wall port catheter terminates at the superior   cavoatrial junction.   Comprehensive metabolic panel     Status: Abnormal   Result Value Ref Range    Sodium 137 135 - 145 mmol/L    Potassium 3.8 3.4 - 5.3 mmol/L    Carbon Dioxide (CO2) 22 22 - 29 mmol/L    Anion Gap 11 7 - 15 mmol/L    Urea Nitrogen 6.1 6.0 - 20.0 mg/dL    Creatinine 0.48 (L) 0.51 - 0.95 mg/dL    GFR Estimate >90 >60 mL/min/1.73m2    Calcium 8.5 (L) 8.6 - 10.0 mg/dL    Chloride 104 98 - 107 mmol/L    Glucose 86 70 - 99 mg/dL    Alkaline Phosphatase 66 40 - 150 U/L    AST 71 (H) 0 - 45 U/L    ALT 49 0 - 50 U/L    Protein Total 6.9 6.4 - 8.3 g/dL    Albumin 4.3 3.5 - 5.2 g/dL    Bilirubin Total 4.5 (H) <=1.2 mg/dL   Reticulocyte count     Status: Abnormal   Result Value Ref Range    % Reticulocyte >30.0 (H) 0.5 - 2.0 %    Absolute Reticulocyte 0.674 (H) 0.025 - 0.095 10e6/uL   Troponin T, High Sensitivity     Status: Normal   Result Value Ref Range    Troponin T, High Sensitivity <6 <=14 ng/L   CBC with platelets and differential     Status: Abnormal   Result Value Ref Range    WBC Count 14.7 (H) 4.0 - 11.0 10e3/uL    RBC Count 2.15 (L) 3.80 - 5.20 10e6/uL    Hemoglobin 7.0 (L) 11.7 - 15.7 g/dL    Hematocrit 19.4  (L) 35.0 - 47.0 %    MCV 90 78 - 100 fL    MCH 32.6 26.5 - 33.0 pg    MCHC 36.1 31.5 - 36.5 g/dL    RDW 28.6 (H) 10.0 - 15.0 %    Platelet Count 327 150 - 450 10e3/uL    % Neutrophils      % Lymphocytes      % Monocytes      % Eosinophils      % Basophils      % Immature Granulocytes      NRBCs per 100 WBC 11 (H) <1 /100    Absolute Neutrophils      Absolute Lymphocytes      Absolute Monocytes      Absolute Eosinophils      Absolute Basophils      Absolute Immature Granulocytes      Absolute NRBCs 1.6 10e3/uL   Manual Differential     Status: Abnormal   Result Value Ref Range    % Neutrophils 61 %    % Lymphocytes 29 %    % Monocytes 7 %    % Eosinophils 2 %    % Basophils 1 %    Absolute Neutrophils 9.0 (H) 1.6 - 8.3 10e3/uL    Absolute Lymphocytes 4.3 0.8 - 5.3 10e3/uL    Absolute Monocytes 1.0 0.0 - 1.3 10e3/uL    Absolute Eosinophils 0.3 0.0 - 0.7 10e3/uL    Absolute Basophils 0.1 0.0 - 0.2 10e3/uL    RBC Morphology Confirmed RBC Indices     Platelet Assessment  Automated Count Confirmed. Platelet morphology is normal.     Automated Count Confirmed. Platelet morphology is normal.    RBC Fragments Moderate (A) None Seen    Sickle Cells Moderate (A) None Seen    Target Cells Slight (A) None Seen    NRBCs per 100 WBC 10 %    Absolute NRBCs 1.5 10e3/uL   EKG 12-lead, tracing only     Status: None (Preliminary result)   Result Value Ref Range    Systolic Blood Pressure  mmHg    Diastolic Blood Pressure  mmHg    Ventricular Rate 87 BPM    Atrial Rate 87 BPM    ND Interval 166 ms    QRS Duration 74 ms     ms    QTc 476 ms    P Axis 69 degrees    R AXIS 44 degrees    T Axis 34 degrees    Interpretation ECG Sinus rhythm  Normal ECG      CBC with platelets differential     Status: Abnormal    Narrative    The following orders were created for panel order CBC with platelets differential.  Procedure                               Abnormality         Status                     ---------                                -----------         ------                     CBC with platelets and d...[[id number]]  Abnormal            Final result               Manual Differential[[id number]]          Abnormal            Final result                 Please view results for these tests on the individual orders.     Medications   HYDROmorphone (DILAUDID) injection 1 mg (1 mg Intravenous $Given 3/17/24 0518)   lactated ringers infusion ( Intravenous $New Bag 3/17/24 0409)   sodium chloride (PF) 0.9% PF flush 10-20 mL (has no administration in time range)   sodium chloride (PF) 0.9% PF flush 10-20 mL (has no administration in time range)   sodium chloride (PF) 0.9% PF flush 10-20 mL (10 mLs Intracatheter $Given 3/17/24 0414)   heparin lock flush 10 UNIT/ML injection 5-10 mL (has no administration in time range)   heparin lock flush 10 UNIT/ML injection 5-10 mL ( Intracatheter Not Given 3/17/24 0159)   ketorolac (TORADOL) injection 15 mg (15 mg Intravenous Not Given 3/17/24 0420)   ondansetron (ZOFRAN) injection 8 mg (8 mg Intravenous $Given 3/17/24 0409)     Labs Ordered and Resulted from Time of ED Arrival to Time of ED Departure   COMPREHENSIVE METABOLIC PANEL - Abnormal       Result Value    Sodium 137      Potassium 3.8      Carbon Dioxide (CO2) 22      Anion Gap 11      Urea Nitrogen 6.1      Creatinine 0.48 (*)     GFR Estimate >90      Calcium 8.5 (*)     Chloride 104      Glucose 86      Alkaline Phosphatase 66      AST 71 (*)     ALT 49      Protein Total 6.9      Albumin 4.3      Bilirubin Total 4.5 (*)    RETICULOCYTE COUNT - Abnormal    % Reticulocyte >30.0 (*)     Absolute Reticulocyte 0.674 (*)    CBC WITH PLATELETS AND DIFFERENTIAL - Abnormal    WBC Count 14.7 (*)     RBC Count 2.15 (*)     Hemoglobin 7.0 (*)     Hematocrit 19.4 (*)     MCV 90      MCH 32.6      MCHC 36.1      RDW 28.6 (*)     Platelet Count 327      % Neutrophils        % Lymphocytes        % Monocytes        % Eosinophils        % Basophils        % Immature  Granulocytes        NRBCs per 100 WBC 11 (*)     Absolute Neutrophils        Absolute Lymphocytes        Absolute Monocytes        Absolute Eosinophils        Absolute Basophils        Absolute Immature Granulocytes        Absolute NRBCs 1.6     MANUAL DIFFERENTIAL - Abnormal    % Neutrophils 61      % Lymphocytes 29      % Monocytes 7      % Eosinophils 2      % Basophils 1      Absolute Neutrophils 9.0 (*)     Absolute Lymphocytes 4.3      Absolute Monocytes 1.0      Absolute Eosinophils 0.3      Absolute Basophils 0.1      RBC Morphology Confirmed RBC Indices      Platelet Assessment        Value: Automated Count Confirmed. Platelet morphology is normal.    RBC Fragments Moderate (*)     Sickle Cells Moderate (*)     Target Cells Slight (*)     NRBCs per 100 WBC 10      Absolute NRBCs 1.5     TROPONIN T, HIGH SENSITIVITY - Normal    Troponin T, High Sensitivity <6       XR Chest 2 Views   Final Result   IMPRESSION: No focal airspace opacity, pleural effusion or pneumothorax. Heart size and pulmonary vascularity are normal. Mild nonspecific elevation of the right hemidiaphragm is unchanged. Left chest wall port catheter terminates at the superior    cavoatrial junction.             Critical care was not performed.     Medical Decision Making  The patient's presentation was of high complexity (a chronic illness severe exacerbation, progression, or side effect of treatment).    The patient's evaluation involved:  review of external note(s) from 3+ sources (prior ED notes, hematology notes, care plan)  review of 3+ test result(s) ordered prior to this encounter (prior labs, chest x-ray, EKG)  ordering and/or review of 3+ test(s) in this encounter (see separate area of note for details)  independent interpretation of testing performed by another health professional (chest x-ray)    The patient's management necessitated high risk (a parenteral controlled substance) and high risk (a decision regarding  hospitalization).    Assessment & Plan    Jennifer Cervantes is a 24 year old female with PMH of sickle cell disease who presents to the ED with sickle cell pain crisis. Upon arrival patient is nontoxic-appearing, afebrile, no distress.  Patient hemodynamically stable vital signs within normal limits.  Patient presenting with sickle cell pain which she reports is typical for her however reports pain in her chest and back so comprehensive labs, EKG, chest x-ray performed.  Patient denies any fever or recent illnesses.  I reviewed EKG which demonstrates normal sinus rhythm with a ventricular rate of 87 bpm no acute ischemic change.  I personally reviewed and interpreted chest x-ray which is unremarkable with no focal airspace opacity, pleural effusion, pneumothorax.  Comprehensive labs remarkable for leukocytosis with white blood cell count of 14.7, per chart review patient typically has leukocytosis and currently has no acute infectious symptoms.  Hemoglobin 7 near baseline, initial high sensitive troponin less than 6, no other acute electrolyte abnormality.    Patient received 3 doses of IV pain medication per care plan and on reevaluation patient resting comfortably, reports improvement of her symptoms and would like to go home with continue pain management with her oral medications.  Encourage close outpatient follow-up with her oncology/hematology team.  Return precautions discussed.  Patient understands and agrees with the plan.    I have reviewed the nursing notes. I have reviewed the findings, diagnosis, plan and need for follow up with the patient.    New Prescriptions    No medications on file       Final diagnoses:   Sickle cell pain crisis (H)   Philip NEWMAN, am serving as a trained medical scribe to document services personally performed by Jamee Martin MD based on the provider's statements to me on March 17, 2024.  This document has been checked and approved by the attending provider.    Jamee NEWMAN.  Mario FUNG, was physically present and have reviewed and verified the accuracy of this note documented by Philip Diamond, medical scribe.      Jamee Martin MD  [facility] EMERGENCY DEPARTMENT  3/17/2024     Jamee Martin MD  03/17/24 0679

## 2024-03-18 ENCOUNTER — MYC MEDICAL ADVICE (OUTPATIENT)
Dept: ONCOLOGY | Facility: CLINIC | Age: 24
End: 2024-03-18

## 2024-03-18 ENCOUNTER — APPOINTMENT (OUTPATIENT)
Dept: LAB | Facility: CLINIC | Age: 24
End: 2024-03-18
Attending: PEDIATRICS
Payer: COMMERCIAL

## 2024-03-18 ENCOUNTER — NURSE TRIAGE (OUTPATIENT)
Dept: ONCOLOGY | Facility: CLINIC | Age: 24
End: 2024-03-18
Payer: COMMERCIAL

## 2024-03-18 ENCOUNTER — ONCOLOGY VISIT (OUTPATIENT)
Dept: ONCOLOGY | Facility: CLINIC | Age: 24
End: 2024-03-18
Attending: PEDIATRICS
Payer: COMMERCIAL

## 2024-03-18 ENCOUNTER — INFUSION THERAPY VISIT (OUTPATIENT)
Dept: TRANSPLANT | Facility: CLINIC | Age: 24
End: 2024-03-18
Attending: PEDIATRICS
Payer: COMMERCIAL

## 2024-03-18 VITALS
RESPIRATION RATE: 16 BRPM | HEART RATE: 90 BPM | TEMPERATURE: 97.9 F | SYSTOLIC BLOOD PRESSURE: 130 MMHG | BODY MASS INDEX: 26.13 KG/M2 | OXYGEN SATURATION: 95 % | WEIGHT: 152.2 LBS | DIASTOLIC BLOOD PRESSURE: 77 MMHG

## 2024-03-18 DIAGNOSIS — E83.111 IRON OVERLOAD DUE TO REPEATED RED BLOOD CELL TRANSFUSIONS: ICD-10-CM

## 2024-03-18 DIAGNOSIS — D57.00 SICKLE CELL PAIN CRISIS (H): Primary | ICD-10-CM

## 2024-03-18 DIAGNOSIS — G81.10 SPASTIC HEMIPLEGIA, UNSPECIFIED ETIOLOGY, UNSPECIFIED LATERALITY (H): ICD-10-CM

## 2024-03-18 DIAGNOSIS — D57.1 HB-SS DISEASE WITHOUT CRISIS (H): ICD-10-CM

## 2024-03-18 LAB
FERRITIN SERPL-MCNC: 3847 NG/ML (ref 6–175)
HOLD SPECIMEN: NORMAL

## 2024-03-18 PROCEDURE — G0463 HOSPITAL OUTPT CLINIC VISIT: HCPCS

## 2024-03-18 PROCEDURE — 96375 TX/PRO/DX INJ NEW DRUG ADDON: CPT

## 2024-03-18 PROCEDURE — 250N000011 HC RX IP 250 OP 636: Performed by: PEDIATRICS

## 2024-03-18 PROCEDURE — 258N000003 HC RX IP 258 OP 636: Performed by: PEDIATRICS

## 2024-03-18 PROCEDURE — 96376 TX/PRO/DX INJ SAME DRUG ADON: CPT

## 2024-03-18 PROCEDURE — 99215 OFFICE O/P EST HI 40 MIN: CPT | Mod: GC | Performed by: PEDIATRICS

## 2024-03-18 PROCEDURE — 96361 HYDRATE IV INFUSION ADD-ON: CPT

## 2024-03-18 PROCEDURE — G0463 HOSPITAL OUTPT CLINIC VISIT: HCPCS | Mod: 25

## 2024-03-18 PROCEDURE — 82728 ASSAY OF FERRITIN: CPT | Performed by: PEDIATRICS

## 2024-03-18 PROCEDURE — 250N000013 HC RX MED GY IP 250 OP 250 PS 637: Performed by: PEDIATRICS

## 2024-03-18 PROCEDURE — 36591 DRAW BLOOD OFF VENOUS DEVICE: CPT | Performed by: PEDIATRICS

## 2024-03-18 PROCEDURE — 96374 THER/PROPH/DIAG INJ IV PUSH: CPT

## 2024-03-18 PROCEDURE — 99417 PROLNG OP E/M EACH 15 MIN: CPT | Mod: GC | Performed by: PEDIATRICS

## 2024-03-18 RX ORDER — IBUPROFEN 600 MG/1
600 TABLET, FILM COATED ORAL EVERY 6 HOURS PRN
COMMUNITY
End: 2024-10-04

## 2024-03-18 RX ORDER — ONDANSETRON 2 MG/ML
8 INJECTION INTRAMUSCULAR; INTRAVENOUS EVERY 6 HOURS PRN
Status: CANCELLED
Start: 2024-04-01

## 2024-03-18 RX ORDER — HEPARIN SODIUM (PORCINE) LOCK FLUSH IV SOLN 100 UNIT/ML 100 UNIT/ML
500 SOLUTION INTRAVENOUS ONCE
Status: COMPLETED | OUTPATIENT
Start: 2024-03-18 | End: 2024-03-18

## 2024-03-18 RX ORDER — DIPHENHYDRAMINE HCL 25 MG
50 CAPSULE ORAL
Status: CANCELLED
Start: 2024-04-01

## 2024-03-18 RX ORDER — DIPHENHYDRAMINE HCL 25 MG
50 CAPSULE ORAL
Status: COMPLETED | OUTPATIENT
Start: 2024-03-18 | End: 2024-03-18

## 2024-03-18 RX ORDER — ONDANSETRON 4 MG/1
8 TABLET, FILM COATED ORAL
Status: CANCELLED
Start: 2024-04-01

## 2024-03-18 RX ORDER — KETOROLAC TROMETHAMINE 30 MG/ML
30 INJECTION, SOLUTION INTRAMUSCULAR; INTRAVENOUS ONCE
Status: CANCELLED
Start: 2024-04-01 | End: 2024-04-01

## 2024-03-18 RX ORDER — HEPARIN SODIUM (PORCINE) LOCK FLUSH IV SOLN 100 UNIT/ML 100 UNIT/ML
5 SOLUTION INTRAVENOUS
Status: DISCONTINUED | OUTPATIENT
Start: 2024-03-18 | End: 2024-03-18 | Stop reason: HOSPADM

## 2024-03-18 RX ORDER — HEPARIN SODIUM,PORCINE 10 UNIT/ML
5 VIAL (ML) INTRAVENOUS
Status: CANCELLED | OUTPATIENT
Start: 2024-04-01

## 2024-03-18 RX ORDER — KETOROLAC TROMETHAMINE 30 MG/ML
30 INJECTION, SOLUTION INTRAMUSCULAR; INTRAVENOUS ONCE
Status: COMPLETED | OUTPATIENT
Start: 2024-03-18 | End: 2024-03-18

## 2024-03-18 RX ORDER — HEPARIN SODIUM (PORCINE) LOCK FLUSH IV SOLN 100 UNIT/ML 100 UNIT/ML
5 SOLUTION INTRAVENOUS
Status: CANCELLED | OUTPATIENT
Start: 2024-04-01

## 2024-03-18 RX ORDER — ONDANSETRON 2 MG/ML
8 INJECTION INTRAMUSCULAR; INTRAVENOUS EVERY 6 HOURS PRN
Status: DISCONTINUED | OUTPATIENT
Start: 2024-03-18 | End: 2024-03-18 | Stop reason: HOSPADM

## 2024-03-18 RX ADMIN — SODIUM CHLORIDE, POTASSIUM CHLORIDE, SODIUM LACTATE AND CALCIUM CHLORIDE 1000 ML: 600; 310; 30; 20 INJECTION, SOLUTION INTRAVENOUS at 12:04

## 2024-03-18 RX ADMIN — HYDROMORPHONE HYDROCHLORIDE 1 MG: 1 INJECTION, SOLUTION INTRAMUSCULAR; INTRAVENOUS; SUBCUTANEOUS at 13:16

## 2024-03-18 RX ADMIN — Medication 5 ML: at 15:21

## 2024-03-18 RX ADMIN — Medication 500 UNITS: at 12:03

## 2024-03-18 RX ADMIN — HYDROMORPHONE HYDROCHLORIDE 1 MG: 1 INJECTION, SOLUTION INTRAMUSCULAR; INTRAVENOUS; SUBCUTANEOUS at 12:14

## 2024-03-18 RX ADMIN — KETOROLAC TROMETHAMINE 30 MG: 30 INJECTION, SOLUTION INTRAMUSCULAR; INTRAVENOUS at 12:15

## 2024-03-18 RX ADMIN — HYDROMORPHONE HYDROCHLORIDE 1 MG: 1 INJECTION, SOLUTION INTRAMUSCULAR; INTRAVENOUS; SUBCUTANEOUS at 14:17

## 2024-03-18 RX ADMIN — DIPHENHYDRAMINE HYDROCHLORIDE 50 MG: 25 CAPSULE ORAL at 12:13

## 2024-03-18 RX ADMIN — ONDANSETRON 8 MG: 2 INJECTION INTRAMUSCULAR; INTRAVENOUS at 12:14

## 2024-03-18 ASSESSMENT — PAIN SCALES - GENERAL: PAINLEVEL: WORST PAIN (10)
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
## 2024-03-18 NOTE — NURSING NOTE
"Oncology Rooming Note    March 18, 2024 12:41 PM   Jennifer Cervantes is a 24 year old female who presents for:    Chief Complaint   Patient presents with    Port Draw     Labs drawn via port by RN in lab.  VS taken    Oncology Clinic Visit     Return; hx Sickle cell anemia     Initial Vitals: /77   Pulse 90   Temp 97.9  F (36.6  C) (Oral)   Resp 16   Wt 69 kg (152 lb 3.2 oz)   SpO2 95%   BMI 26.13 kg/m   Estimated body mass index is 26.13 kg/m  as calculated from the following:    Height as of 3/17/24: 1.626 m (5' 4\").    Weight as of this encounter: 69 kg (152 lb 3.2 oz). Body surface area is 1.77 meters squared.  Worst Pain (10) Comment: Data Unavailable   No LMP recorded. Patient has had an implant.  Allergies reviewed: Yes  Medications reviewed: Yes    Medications: MEDICATION REFILLS NEEDED TODAY. Provider was notified.  Pharmacy name entered into [employer]: [place], [place] - [address] [place] [address] [phone]    Frailty Screening:   Is the patient here for a new oncology consult visit in cancer care? 2. No      Clinical concerns: None.       Jaida Kelly RN              "

## 2024-03-18 NOTE — TELEPHONE ENCOUNTER
Oncology Nurse Triage - Sickle Cell Pain Crisis:    Situation: Jennifer  calling about Sickle Cell Pain Crisis, requesting to be added on for IV fluids and pain medicine    Background:     Patient's last infusion was 03/17/24 ED visit  Last clinic visit date:02/23/24 w/ Patricia Mantilla  Does patient have active treatment plan?  Yes      Assessment of Symptoms:  Onset/Duration of symptoms: 3 day    Is it typical sickle cell pain? Yes   Location: Back  Character: Sharp           Intensity: 9/10    Any radiation of pain, numbness, tingling, weakness, warmth, swelling, discoloration of arms or legs?No     Fever?No  (if yes max temperature recorded in last 24 hours):      Chest Pain Present: No     Shortness of breath: No     Other home therapies tried: HEAT/HEATING PAD and WARM BATH     Last home medication taken and when: 0600 oxycodone    Any Refills Needed?: No     Does patient have transportation & length of time to get to clinic: Yes, if an appt opens up quickly; however, will need help with transportation if its later in the day.        Recommendations:   If you do not hear from the infusion center by 2pm then you will not be able to get in for an infusion today. If symptoms worsen while waiting for call back, and/or you experience fever, chills, SOB, chest pain, cough, n/v, dizziness, numbness, swelling, discoloration of extremities, then seek emergency evaluation in Emergency Department.     Added to infusion wait list for IVF/pain meds per protocol.    Secure message sent to Patricia Mantilla for approval    0749 Patricia Mantilla approving IVF/Pain meds for today.

## 2024-03-18 NOTE — PROGRESS NOTES
Infusion Nursing Note:  Jennifer Cervantes presents today for add-on IVF/pain meds for 10/10 back pain.    Patient seen by provider today: Yes: Dr. Duncan   present during visit today: Not Applicable.    Note: VSS. Meds and allergies reviewed. Pt assessed by provider during infusion. Pt received 1L LR, 50 mg Benadryl PO, 8 mg Zofran IVP, 30 mg Toradol IVP, and 1 mg Dilaudid IVP x3 doses (total 3 mg IVP). Pt reported improvement in pain at time of discharge.      Intravenous Access:  Implanted Port.    Treatment Conditions:  Not Applicable.      Post Infusion Assessment:  Patient tolerated infusion without incident.  Blood return noted pre and post infusion.  Site patent and intact, free from redness, edema or discomfort.  No evidence of extravasations.  Access discontinued per protocol.       Discharge Plan:   Patient discharged in stable condition accompanied by: self.  Departure Mode: Ambulatory.      Jaida Kelly RN

## 2024-03-18 NOTE — TELEPHONE ENCOUNTER
Available appt with BMT at 1230 with labs at 1145. Arely RNCC stating  can see pt at infusion if okay with pt.     0701 Call to pt to offer appt. Pt stating she is okay with seeing  in infusion and confirms she can make appt. Pt also stating she does not need help with transportation.     Message to [facility] to assist with scheduling.
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
## 2024-03-18 NOTE — NURSING NOTE
"Chief Complaint   Patient presents with    Port Draw     Labs drawn via port by RN in lab.  VS taken       Port accessed with 20 gauge 3/4\" Power needle by RN, labs collected, line flushed with saline and heparin.  Vitals taken. Pt checked in for appointment(s).    Lilian Chiu RN    "

## 2024-03-19 ENCOUNTER — NURSE TRIAGE (OUTPATIENT)
Dept: ONCOLOGY | Facility: CLINIC | Age: 24
End: 2024-03-19
Payer: COMMERCIAL

## 2024-03-19 LAB
ABO/RH(D): NORMAL
ANTIBODY SCREEN: NEGATIVE
SPECIMEN EXPIRATION DATE: NORMAL

## 2024-03-19 NOTE — TELEPHONE ENCOUNTER
Jennifer calling wondering about update from care team about discussion with Dr Duncan yesterday about blood transfusion for this Friday 3/22/2024.      This writer informed Jennifer will send inquiry to care team.

## 2024-03-20 ENCOUNTER — PATIENT OUTREACH (OUTPATIENT)
Dept: ONCOLOGY | Facility: CLINIC | Age: 24
End: 2024-03-20

## 2024-03-20 ENCOUNTER — LAB (OUTPATIENT)
Dept: LAB | Facility: CLINIC | Age: 24
End: 2024-03-20
Attending: PEDIATRICS
Payer: COMMERCIAL

## 2024-03-20 DIAGNOSIS — D57.1 HB-SS DISEASE WITHOUT CRISIS (H): ICD-10-CM

## 2024-03-20 LAB
BASOPHILS # BLD AUTO: 0.2 10E3/UL (ref 0–0.2)
BASOPHILS NFR BLD AUTO: 1 %
EOSINOPHIL # BLD AUTO: 0.5 10E3/UL (ref 0–0.7)
EOSINOPHIL NFR BLD AUTO: 4 %
ERYTHROCYTE [DISTWIDTH] IN BLOOD BY AUTOMATED COUNT: 25.6 % (ref 10–15)
HCT VFR BLD AUTO: 23.2 % (ref 35–47)
HGB BLD-MCNC: 8.1 G/DL (ref 11.7–15.7)
IMM GRANULOCYTES # BLD: 0.1 10E3/UL
IMM GRANULOCYTES NFR BLD: 0 %
LYMPHOCYTES # BLD AUTO: 1.5 10E3/UL (ref 0.8–5.3)
LYMPHOCYTES NFR BLD AUTO: 12 %
MCH RBC QN AUTO: 32.1 PG (ref 26.5–33)
MCHC RBC AUTO-ENTMCNC: 34.9 G/DL (ref 31.5–36.5)
MCV RBC AUTO: 92 FL (ref 78–100)
MONOCYTES # BLD AUTO: 0.9 10E3/UL (ref 0–1.3)
MONOCYTES NFR BLD AUTO: 8 %
NEUTROPHILS # BLD AUTO: 8.8 10E3/UL (ref 1.6–8.3)
NEUTROPHILS NFR BLD AUTO: 75 %
NRBC # BLD AUTO: 0.4 10E3/UL
NRBC BLD AUTO-RTO: 4 /100
PLATELET # BLD AUTO: 379 10E3/UL (ref 150–450)
RBC # BLD AUTO: 2.52 10E6/UL (ref 3.8–5.2)
WBC # BLD AUTO: 11.9 10E3/UL (ref 4–11)

## 2024-03-20 PROCEDURE — 86900 BLOOD TYPING SEROLOGIC ABO: CPT

## 2024-03-20 PROCEDURE — 36591 DRAW BLOOD OFF VENOUS DEVICE: CPT

## 2024-03-20 PROCEDURE — 250N000011 HC RX IP 250 OP 636: Performed by: PEDIATRICS

## 2024-03-20 PROCEDURE — 86923 COMPATIBILITY TEST ELECTRIC: CPT | Performed by: PEDIATRICS

## 2024-03-20 PROCEDURE — 85025 COMPLETE CBC W/AUTO DIFF WBC: CPT

## 2024-03-20 RX ORDER — DIPHENHYDRAMINE HYDROCHLORIDE 50 MG/ML
50 INJECTION INTRAMUSCULAR; INTRAVENOUS
Status: CANCELLED
Start: 2024-03-21

## 2024-03-20 RX ORDER — MEPERIDINE HYDROCHLORIDE 25 MG/ML
25 INJECTION INTRAMUSCULAR; INTRAVENOUS; SUBCUTANEOUS EVERY 30 MIN PRN
Status: CANCELLED | OUTPATIENT
Start: 2024-03-21

## 2024-03-20 RX ORDER — ALBUTEROL SULFATE 0.83 MG/ML
2.5 SOLUTION RESPIRATORY (INHALATION)
Status: CANCELLED | OUTPATIENT
Start: 2024-03-21

## 2024-03-20 RX ORDER — METHYLPREDNISOLONE SODIUM SUCCINATE 125 MG/2ML
125 INJECTION, POWDER, LYOPHILIZED, FOR SOLUTION INTRAMUSCULAR; INTRAVENOUS
Status: CANCELLED
Start: 2024-03-21

## 2024-03-20 RX ORDER — HEPARIN SODIUM,PORCINE 10 UNIT/ML
5 VIAL (ML) INTRAVENOUS
Status: CANCELLED | OUTPATIENT
Start: 2024-03-21

## 2024-03-20 RX ORDER — ALBUTEROL SULFATE 90 UG/1
1-2 AEROSOL, METERED RESPIRATORY (INHALATION)
Status: CANCELLED
Start: 2024-03-21

## 2024-03-20 RX ORDER — EPINEPHRINE 1 MG/ML
0.3 INJECTION, SOLUTION INTRAMUSCULAR; SUBCUTANEOUS EVERY 5 MIN PRN
Status: CANCELLED | OUTPATIENT
Start: 2024-03-21

## 2024-03-20 RX ORDER — HEPARIN SODIUM (PORCINE) LOCK FLUSH IV SOLN 100 UNIT/ML 100 UNIT/ML
5 SOLUTION INTRAVENOUS
Status: CANCELLED | OUTPATIENT
Start: 2024-03-21

## 2024-03-20 RX ORDER — HEPARIN SODIUM (PORCINE) LOCK FLUSH IV SOLN 100 UNIT/ML 100 UNIT/ML
5 SOLUTION INTRAVENOUS ONCE
Status: COMPLETED | OUTPATIENT
Start: 2024-03-20 | End: 2024-03-20

## 2024-03-20 RX ADMIN — Medication 5 ML: at 11:57

## 2024-03-20 NOTE — NURSING NOTE
"Chief Complaint   Patient presents with    Port Draw     Lab only appointment. Port accessed and labs drawn by rn in lab.         Port accessed with 20g 3/4\" gripper needle, labs drawn by RN in lab.  Port flushed with saline and heparin.  Port de-accessed.    Staci Napoles RN    "

## 2024-03-20 NOTE — PROGRESS NOTES
[facility]: Cancer Care Follow-Up Note                                    Discussion with Patient:                                                      Spoke with pt- she will get her labs drawn today for blood transfusion Friday.  Dr Duncan she is getting blood due to fatigue and Hgb was 7.0 on 3/17.    Pt also asked if she could make an appt with Dr Sales.  When pressed for details, patient stated she wanted a second opinion.  She did not want to change MD's completely, but wanted a visit with a new MD. She stated she had a hard visit on Monday with Dr Duncan. IB sent to manager, Mónica Duncan and Isabelle to discuss.          Goals          General     Pain Management (pt-stated)      Notes - Note created  10/13/2023  3:51 PM by Arely Krueger RN     Goal Statement: I will establish a plan for preventing and managing pain.  Date Goal set: 10/13/2023  Barriers: disease burden, multiple diagnoses, and transportation  Strengths: motivation, health awareness, and involvement with care team  Date to Achieve By: ongoing  Patient expressed understanding of goal: Yes  Action steps to achieve this goal:  I will take medications as prescribed.   I will call triage with new or worsening pain not controlled by medication.   I will use alternative therapies as directed. (Ice, heat, massage, etc)   I will establish care will palliative care department for ongoing pain management as needed.   I will call triage for medication refills when there are 2-3 days of medication remaining.                 Dates of Treatment:                                                      Infusion given in last 28 days       None            Assessment:                                                        Plan of Care Education Review:   Assessment completed with:: Patient    Plan of Care Education   Plan of Care:: Treatment schedule;Lab appointment  Procedure education provided for: : Blood product transfusion    Evaluation  of Learning  Patient Education Provided: Yes  Readiness:: Acceptance  Method:: Explanation  Response:: Verbalizes understanding           Intervention/Education provided during outreach:                                                       Pt has appt on Friday for Lab, Jr and transfusion    Patient to follow up as scheduled at next appt  Patient to call/MyChart message with updates  Confirmed patient has clinic and triage numbers    Signature:  Arely Krueger RN

## 2024-03-21 ENCOUNTER — TELEPHONE (OUTPATIENT)
Dept: ONCOLOGY | Facility: CLINIC | Age: 24
End: 2024-03-21
Payer: COMMERCIAL

## 2024-03-21 DIAGNOSIS — D57.00 SICKLE CELL PAIN CRISIS (H): ICD-10-CM

## 2024-03-21 LAB
BLD PROD TYP BPU: NORMAL
BLOOD COMPONENT TYPE: NORMAL
CODING SYSTEM: NORMAL
CROSSMATCH: NORMAL
ISSUE DATE AND TIME: NORMAL
UNIT ABO/RH: NORMAL
UNIT NUMBER: NORMAL
UNIT STATUS: NORMAL
UNIT TYPE ISBT: 5100

## 2024-03-21 RX ORDER — OXYCODONE HYDROCHLORIDE 10 MG/1
10 TABLET ORAL EVERY 4 HOURS PRN
Qty: 20 TABLET | Refills: 0 | Status: SHIPPED | OUTPATIENT
Start: 2024-03-21 | End: 2024-03-27

## 2024-03-21 RX ORDER — HEPARIN SODIUM (PORCINE) LOCK FLUSH IV SOLN 100 UNIT/ML 100 UNIT/ML
5 SOLUTION INTRAVENOUS
Status: CANCELLED | OUTPATIENT
Start: 2024-03-21

## 2024-03-21 RX ORDER — HEPARIN SODIUM,PORCINE 10 UNIT/ML
5 VIAL (ML) INTRAVENOUS
Status: CANCELLED | OUTPATIENT
Start: 2024-03-21

## 2024-03-21 NOTE — TELEPHONE ENCOUNTER
Prior Authorization Approval    Medication: OXYCODONE HCL 10 MG PO TABS  Authorization Effective Date: 2/20/2024  Authorization Expiration Date: 3/21/2025  Approved Dose/Quantity: 20 per 4 DS  Reference #: [id number]   Insurance Company: [employer] - Phone [phone] Fax [phone]  Expected CoPay: $    CoPay Card Available:      Financial Assistance Needed:   Which Pharmacy is filling the prescription: [facility] [address]  Pharmacy Notified: yes  Patient Notified: yes          Thank you,    Carole Ritchie  Oncology Pharmacy Liaison II  [email or web address]  Phone: [phone]  Fax: [phone]

## 2024-03-21 NOTE — TELEPHONE ENCOUNTER
PA Initiation    Medication: OXYCODONE HCL 10 MG PO TABS  Insurance Company: [employer] - Phone [phone] Fax [phone]  Pharmacy Filling the Rx: [facility], [place] - [address] [place] [address] [phone]  Filling Pharmacy Phone:    Filling Pharmacy Fax:    Start Date: 3/21/2024          Thank you,    Carole Ritchie  Oncology Pharmacy Liaison [email or web address]  Phone: [phone]  Fax: [phone]

## 2024-03-21 NOTE — CONFIDENTIAL NOTE
Jennifer would like to speak to Patricia Mantilla , Jennifer states it is important but would not tell this writer what it is regarding.   Has Jr and Blood transfusion tomorrow at 11:30 and was going to call in tomorrow to have pain meds and fluids added on to her appointment.   This is not what she wants to talk with Patricia about it is something else that is important and she will be in at 11:30 for her Jr and blood transfusion.

## 2024-03-21 NOTE — TELEPHONE ENCOUNTER
Narcotic Refill Request    Medication(s) requested:  oxycodone   Person Requesting Refill: Jennifer   What pain is the medication treating:  sickle cell pain   How is the medication being taken?:takes every 6 hours takes 6 max per day   Does pt have enough for today? Yes   Is pain being adequately controlled on the current regimen?: yes   Experiencing any side effects from medication?: No     Date of most recent appointment:  2/23/24 Patricia Mantilla   Any No Show Visits: no   Next appointment:   4/29/24 Patricia Mantilla   Last fill date and by whom:  3/14/24 Patricia Mantilla    Reviewed:  No access     Send to provider: Patricia Mantilla and Sebastian Krueger RNCC

## 2024-03-22 ENCOUNTER — NURSE TRIAGE (OUTPATIENT)
Dept: ONCOLOGY | Facility: CLINIC | Age: 24
End: 2024-03-22
Payer: COMMERCIAL

## 2024-03-22 ENCOUNTER — APPOINTMENT (OUTPATIENT)
Dept: LAB | Facility: CLINIC | Age: 24
End: 2024-03-22
Attending: PEDIATRICS
Payer: COMMERCIAL

## 2024-03-22 ENCOUNTER — INFUSION THERAPY VISIT (OUTPATIENT)
Dept: ONCOLOGY | Facility: CLINIC | Age: 24
End: 2024-03-22
Attending: PEDIATRICS
Payer: COMMERCIAL

## 2024-03-22 ENCOUNTER — ONCOLOGY VISIT (OUTPATIENT)
Dept: ONCOLOGY | Facility: CLINIC | Age: 24
End: 2024-03-22
Attending: REGISTERED NURSE
Payer: COMMERCIAL

## 2024-03-22 VITALS
RESPIRATION RATE: 16 BRPM | HEART RATE: 69 BPM | OXYGEN SATURATION: 98 % | TEMPERATURE: 97.8 F | DIASTOLIC BLOOD PRESSURE: 64 MMHG | SYSTOLIC BLOOD PRESSURE: 108 MMHG

## 2024-03-22 VITALS
BODY MASS INDEX: 25.58 KG/M2 | TEMPERATURE: 98 F | RESPIRATION RATE: 16 BRPM | HEART RATE: 108 BPM | DIASTOLIC BLOOD PRESSURE: 84 MMHG | OXYGEN SATURATION: 95 % | WEIGHT: 149 LBS | SYSTOLIC BLOOD PRESSURE: 120 MMHG

## 2024-03-22 DIAGNOSIS — D57.1 HB-SS DISEASE WITHOUT CRISIS (H): Primary | ICD-10-CM

## 2024-03-22 DIAGNOSIS — D57.1 HB-SS DISEASE WITHOUT CRISIS (H): ICD-10-CM

## 2024-03-22 DIAGNOSIS — Z86.73 HISTORY OF STROKE: ICD-10-CM

## 2024-03-22 DIAGNOSIS — G81.10 SPASTIC HEMIPLEGIA, UNSPECIFIED ETIOLOGY, UNSPECIFIED LATERALITY (H): ICD-10-CM

## 2024-03-22 DIAGNOSIS — D57.00 SICKLE CELL PAIN CRISIS (H): Primary | ICD-10-CM

## 2024-03-22 LAB
ANION GAP SERPL CALCULATED.3IONS-SCNC: 12 MMOL/L (ref 7–15)
BASOPHILS # BLD AUTO: 0.2 10E3/UL (ref 0–0.2)
BASOPHILS NFR BLD AUTO: 2 %
BUN SERPL-MCNC: 9.3 MG/DL (ref 6–20)
CALCIUM SERPL-MCNC: 9 MG/DL (ref 8.6–10)
CHLORIDE SERPL-SCNC: 103 MMOL/L (ref 98–107)
CREAT SERPL-MCNC: 0.49 MG/DL (ref 0.51–0.95)
DEPRECATED HCO3 PLAS-SCNC: 21 MMOL/L (ref 22–29)
EGFRCR SERPLBLD CKD-EPI 2021: >90 ML/MIN/1.73M2
EOSINOPHIL # BLD AUTO: 0.4 10E3/UL (ref 0–0.7)
EOSINOPHIL NFR BLD AUTO: 4 %
ERYTHROCYTE [DISTWIDTH] IN BLOOD BY AUTOMATED COUNT: 22.9 % (ref 10–15)
GLUCOSE SERPL-MCNC: 106 MG/DL (ref 70–99)
HCT VFR BLD AUTO: 20.8 % (ref 35–47)
HGB BLD-MCNC: 7.4 G/DL (ref 11.7–15.7)
IMM GRANULOCYTES # BLD: 0 10E3/UL
IMM GRANULOCYTES NFR BLD: 0 %
LYMPHOCYTES # BLD AUTO: 1.4 10E3/UL (ref 0.8–5.3)
LYMPHOCYTES NFR BLD AUTO: 12 %
MCH RBC QN AUTO: 31.8 PG (ref 26.5–33)
MCHC RBC AUTO-ENTMCNC: 35.6 G/DL (ref 31.5–36.5)
MCV RBC AUTO: 89 FL (ref 78–100)
MONOCYTES # BLD AUTO: 0.9 10E3/UL (ref 0–1.3)
MONOCYTES NFR BLD AUTO: 8 %
NEUTROPHILS # BLD AUTO: 8.3 10E3/UL (ref 1.6–8.3)
NEUTROPHILS NFR BLD AUTO: 74 %
NRBC # BLD AUTO: 0.2 10E3/UL
NRBC BLD AUTO-RTO: 2 /100
PLATELET # BLD AUTO: 355 10E3/UL (ref 150–450)
POTASSIUM SERPL-SCNC: 4 MMOL/L (ref 3.4–5.3)
RBC # BLD AUTO: 2.33 10E6/UL (ref 3.8–5.2)
RETICS # AUTO: 0.51 10E6/UL (ref 0.03–0.1)
RETICS/RBC NFR AUTO: 22.6 % (ref 0.5–2)
SODIUM SERPL-SCNC: 136 MMOL/L (ref 135–145)
WBC # BLD AUTO: 11.3 10E3/UL (ref 4–11)

## 2024-03-22 PROCEDURE — 250N000011 HC RX IP 250 OP 636: Performed by: PEDIATRICS

## 2024-03-22 PROCEDURE — 96376 TX/PRO/DX INJ SAME DRUG ADON: CPT

## 2024-03-22 PROCEDURE — 85048 AUTOMATED LEUKOCYTE COUNT: CPT | Performed by: PEDIATRICS

## 2024-03-22 PROCEDURE — 96365 THER/PROPH/DIAG IV INF INIT: CPT

## 2024-03-22 PROCEDURE — 99215 OFFICE O/P EST HI 40 MIN: CPT | Performed by: REGISTERED NURSE

## 2024-03-22 PROCEDURE — 96375 TX/PRO/DX INJ NEW DRUG ADDON: CPT

## 2024-03-22 PROCEDURE — G0463 HOSPITAL OUTPT CLINIC VISIT: HCPCS | Mod: 25,27 | Performed by: REGISTERED NURSE

## 2024-03-22 PROCEDURE — 80048 BASIC METABOLIC PNL TOTAL CA: CPT | Performed by: PEDIATRICS

## 2024-03-22 PROCEDURE — 36591 DRAW BLOOD OFF VENOUS DEVICE: CPT | Performed by: PEDIATRICS

## 2024-03-22 PROCEDURE — 85045 AUTOMATED RETICULOCYTE COUNT: CPT | Performed by: PEDIATRICS

## 2024-03-22 PROCEDURE — 250N000013 HC RX MED GY IP 250 OP 250 PS 637: Performed by: PEDIATRICS

## 2024-03-22 PROCEDURE — 250N000011 HC RX IP 250 OP 636: Performed by: REGISTERED NURSE

## 2024-03-22 PROCEDURE — 258N000003 HC RX IP 258 OP 636: Performed by: PEDIATRICS

## 2024-03-22 PROCEDURE — 36430 TRANSFUSION BLD/BLD COMPNT: CPT

## 2024-03-22 PROCEDURE — 96361 HYDRATE IV INFUSION ADD-ON: CPT

## 2024-03-22 PROCEDURE — P9016 RBC LEUKOCYTES REDUCED: HCPCS | Performed by: PEDIATRICS

## 2024-03-22 PROCEDURE — G0463 HOSPITAL OUTPT CLINIC VISIT: HCPCS | Mod: 25

## 2024-03-22 RX ORDER — ONDANSETRON 4 MG/1
8 TABLET, FILM COATED ORAL
Status: CANCELLED
Start: 2024-04-01

## 2024-03-22 RX ORDER — HEPARIN SODIUM (PORCINE) LOCK FLUSH IV SOLN 100 UNIT/ML 100 UNIT/ML
5 SOLUTION INTRAVENOUS ONCE
Status: COMPLETED | OUTPATIENT
Start: 2024-03-22 | End: 2024-03-22

## 2024-03-22 RX ORDER — DIPHENHYDRAMINE HCL 25 MG
50 CAPSULE ORAL
Status: COMPLETED | OUTPATIENT
Start: 2024-03-22 | End: 2024-03-22

## 2024-03-22 RX ORDER — KETOROLAC TROMETHAMINE 30 MG/ML
30 INJECTION, SOLUTION INTRAMUSCULAR; INTRAVENOUS ONCE
Status: COMPLETED | OUTPATIENT
Start: 2024-03-22 | End: 2024-03-22

## 2024-03-22 RX ORDER — KETOROLAC TROMETHAMINE 30 MG/ML
30 INJECTION, SOLUTION INTRAMUSCULAR; INTRAVENOUS ONCE
Status: CANCELLED
Start: 2024-04-01 | End: 2024-04-01

## 2024-03-22 RX ORDER — HEPARIN SODIUM,PORCINE 10 UNIT/ML
5 VIAL (ML) INTRAVENOUS
Status: CANCELLED | OUTPATIENT
Start: 2024-04-01

## 2024-03-22 RX ORDER — ONDANSETRON 2 MG/ML
8 INJECTION INTRAMUSCULAR; INTRAVENOUS EVERY 6 HOURS PRN
Status: CANCELLED
Start: 2024-04-01

## 2024-03-22 RX ORDER — HEPARIN SODIUM (PORCINE) LOCK FLUSH IV SOLN 100 UNIT/ML 100 UNIT/ML
5 SOLUTION INTRAVENOUS
Status: DISCONTINUED | OUTPATIENT
Start: 2024-03-22 | End: 2024-03-22 | Stop reason: HOSPADM

## 2024-03-22 RX ORDER — ONDANSETRON 2 MG/ML
8 INJECTION INTRAMUSCULAR; INTRAVENOUS EVERY 6 HOURS PRN
Status: DISCONTINUED | OUTPATIENT
Start: 2024-03-22 | End: 2024-03-22 | Stop reason: HOSPADM

## 2024-03-22 RX ORDER — DIPHENHYDRAMINE HCL 25 MG
50 CAPSULE ORAL
Status: CANCELLED
Start: 2024-04-01

## 2024-03-22 RX ORDER — HEPARIN SODIUM (PORCINE) LOCK FLUSH IV SOLN 100 UNIT/ML 100 UNIT/ML
5 SOLUTION INTRAVENOUS
Status: CANCELLED | OUTPATIENT
Start: 2024-04-01

## 2024-03-22 RX ADMIN — HYDROMORPHONE HYDROCHLORIDE 1 MG: 1 INJECTION, SOLUTION INTRAMUSCULAR; INTRAVENOUS; SUBCUTANEOUS at 15:00

## 2024-03-22 RX ADMIN — HYDROMORPHONE HYDROCHLORIDE 1 MG: 1 INJECTION, SOLUTION INTRAMUSCULAR; INTRAVENOUS; SUBCUTANEOUS at 13:39

## 2024-03-22 RX ADMIN — HYDROMORPHONE HYDROCHLORIDE 1 MG: 1 INJECTION, SOLUTION INTRAMUSCULAR; INTRAVENOUS; SUBCUTANEOUS at 12:07

## 2024-03-22 RX ADMIN — ONDANSETRON 8 MG: 2 INJECTION INTRAMUSCULAR; INTRAVENOUS at 12:11

## 2024-03-22 RX ADMIN — Medication 5 ML: at 10:51

## 2024-03-22 RX ADMIN — DIPHENHYDRAMINE HYDROCHLORIDE 50 MG: 25 CAPSULE ORAL at 12:09

## 2024-03-22 RX ADMIN — SODIUM CHLORIDE 250 ML: 9 INJECTION, SOLUTION INTRAVENOUS at 12:07

## 2024-03-22 RX ADMIN — Medication 5 ML: at 16:08

## 2024-03-22 RX ADMIN — KETOROLAC TROMETHAMINE 30 MG: 30 INJECTION, SOLUTION INTRAMUSCULAR; INTRAVENOUS at 12:34

## 2024-03-22 RX ADMIN — SODIUM CHLORIDE, POTASSIUM CHLORIDE, SODIUM LACTATE AND CALCIUM CHLORIDE 1000 ML: 600; 310; 30; 20 INJECTION, SOLUTION INTRAVENOUS at 14:59

## 2024-03-22 RX ADMIN — SODIUM CHLORIDE 338 MG: 9 INJECTION, SOLUTION INTRAVENOUS at 13:38

## 2024-03-22 ASSESSMENT — PAIN SCALES - GENERAL: PAINLEVEL: EXTREME PAIN (9)

## 2024-03-22 NOTE — Clinical Note
3/22/2024         RE: Jennifer Cervantes  [address]  [place] [address]        Dear Colleague,    Thank you for referring your patient, Jennifer Cervantes, to the [facility]. Please see a copy of my visit note below.    No notes on file    Again, thank you for allowing me to participate in the care of your patient.        Sincerely,        Patricia Mantilla CNP

## 2024-03-22 NOTE — PATIENT INSTRUCTIONS
[facility] Triage and after hours / weekends / holidays:  [phone] option 5, option 2    Please call the triage or after hours line if you experience a temperature greater than or equal to 100.4, shaking chills, have uncontrolled nausea, vomiting and/or diarrhea, dizziness, shortness of breath, chest pain, bleeding, unexplained bruising, or if you have any other new/concerning symptoms, questions or concerns.      If you are having any concerning symptoms or wish to speak to a provider before your next infusion visit, please call triage to notify your care team so we can adequately serve you.     If you need a refill on a narcotic prescription or other medication, please call before your infusion appointment.

## 2024-03-22 NOTE — LETTER
[facility]  [address] [facility] [address]  Phone: [phone]  Fax: [phone]    March 22, 2024        Jennifer Cervantes  [address]  [place] [address]          To whom it may concern:    RE: Jennifer Cervantes    Patient was seen and treated today at our clinic. She should remain out of work through 4/5/24 due to her current medical condition. We will continue to reassess her ability to resume a modified work schedule moving forward.      Please contact me for questions or concerns.      Sincerely,          Patricia Mantilla, CNP

## 2024-03-22 NOTE — TELEPHONE ENCOUNTER
"Oncology Nurse Triage - Sickle Cell Pain Crisis:  Situation: Jennifer  calling about Sickle Cell Pain Crisis, requesting to be added on for IV fluids and pain medicine    Background:   Patient's last infusion was 3/18/24  Last clinic visit date: 3/18/24 w/ Dr. Duncan. Pt sees Patricia Mantilla today at 1pm.   Does patient have active treatment plan?  Yes    Assessment of Symptoms:  Onset/Duration of symptoms: 2 day    Is it typical sickle cell pain? Yes   Location: back, legs, \"generalized\"  Character: Sharp           Intensity: 9/10    Any radiation of pain, numbness, tingling, weakness, warmth, swelling, discoloration of arms or legs? Yes- pain only, denies other symptoms    Fever?No    Chest Pain Present: No     Shortness of breath: No     Other home therapies tried: HEAT/HEATING PAD and WARM BATH     Last home medication taken and when: 0600 Oxycodone and IBU    Any Refills Needed?: No     Does patient have transportation & length of time to get to clinic: Yes     Recommendations:   Call to [facility], [facility] w/ Kelsey, who states okay to add on IVF/pain meds to 1130 appt today.     Please note, if you are late for your appt, you risk losing your infusion appt as it may delay another patient's infusion who arrived on time.          " sinus, rate 53, no ST elevations/depressions, flattened T lateral leads

## 2024-03-22 NOTE — PROGRESS NOTES
Infusion Nursing Note:  Jennifer Cervantes presents today for Possible Transfusion/Jr/IV Fluids/Pain Meds.    Patient seen by provider today: Yes: Patricia Mantilla CNP during infusion   present during visit today: Not Applicable.    Note: Jennifer reported significant fatigue today. She reported that she needed to take a break multiple times when getting ready this morning.    Reported generalized 9/10 pain upon arrival to infusion. Received IV dilaudid 1 mg x 3 and IV toradol. Pain down to 6/10 at time of discharge. Patient reported feeling comfortable to discharge home and continue to manage pain.    Upon arrival to infusion room, patient's oxygen saturation was 88% on room air. 2L O2 via nasal cannula applied. Recovered to 97%. Patricia Mantilla CNP notified.    After 1 unit pRBCs, patient's oxygen saturation was 97% on room air. Patient remained on room air for approximately 1 hour while jr infused.    After jr infusion, patient's O2 saturation was hovering between 88-91% on room air. Patient was drowsy but easily arousable. Patricia Mantilla at bedside at time.    TAYO Mantilla CNP/Tammie Hale RN 03/22/24 @ 1500  - place patient on 2L oxygen during IV fluids  - patient is okay to discharge if she can maintain oxygen sats around 90-92% on room air with deep breathing at time of discharge    After fluid bolus, patient's oxygen saturations would drop to 89-90% when sleeping. Upon waking and taking deep breaths, sats recovered to 97% on room air.     Patient requested to be discharged home. She confirmed that she has an oximeter at home. Educated patient to monitor her oxygen saturation at home this weekend and to have her mom monitor her saturation when sleeping. Encouraged patient to present to ED if she is unable to maintain saturations of 90% or greater. Verbalized understanding.    Intravenous Access:  Implanted Port.    Treatment Conditions:   Latest Reference Range & Units 03/22/24 10:45   Sodium 135 -  145 mmol/L 136   Potassium 3.4 - 5.3 mmol/L 4.0   Chloride 98 - 107 mmol/L 103   Carbon Dioxide (CO2) 22 - 29 mmol/L 21 (L)   Urea Nitrogen 6.0 - 20.0 mg/dL 9.3   Creatinine 0.51 - 0.95 mg/dL 0.49 (L)   GFR Estimate >60 mL/min/1.73m2 >90   Calcium 8.6 - 10.0 mg/dL 9.0   Anion Gap 7 - 15 mmol/L 12   Glucose 70 - 99 mg/dL 106 (H)   WBC 4.0 - 11.0 10e3/uL 11.3 (H)   Hemoglobin 11.7 - 15.7 g/dL 7.4 (L)   Hematocrit 35.0 - 47.0 % 20.8 (L)   Platelet Count 150 - 450 10e3/uL 355   RBC Count 3.80 - 5.20 10e6/uL 2.33 (L)   MCV 78 - 100 fL 89   MCH 26.5 - 33.0 pg 31.8   MCHC 31.5 - 36.5 g/dL 35.6   RDW 10.0 - 15.0 % 22.9 (H)   % Neutrophils % 74   % Lymphocytes % 12   % Monocytes % 8   % Eosinophils % 4   % Basophils % 2   Absolute Basophils 0.0 - 0.2 10e3/uL 0.2   Absolute Eosinophils 0.0 - 0.7 10e3/uL 0.4   Absolute Immature Granulocytes <=0.4 10e3/uL 0.0   Absolute Lymphocytes 0.8 - 5.3 10e3/uL 1.4   Absolute Monocytes 0.0 - 1.3 10e3/uL 0.9   % Immature Granulocytes % 0   Absolute Neutrophils 1.6 - 8.3 10e3/uL 8.3   Absolute NRBCs 10e3/uL 0.2   NRBCs per 100 WBC <1 /100 2 (H)     Results reviewed, labs MET treatment parameters, ok to proceed with treatment.  Blood transfusion consent signed 8/18/23.      Post Infusion Assessment:  Patient tolerated infusion without incident.  Blood return noted pre and post infusion.  Site patent and intact, free from redness, edema or discomfort.  No evidence of extravasations.  Access discontinued per protocol.       Discharge Plan:   Prescription refills given for oxycodone.  Discharge instructions reviewed with: Patient.  Patient and/or family verbalized understanding of discharge instructions and all questions answered.  AVS to patient via MYCHART.  Patient will return 4/19 for next appointment.   Patient discharged in stable condition accompanied by: self.  Departure Mode: Ambulatory.      Earnestine Hale RN

## 2024-03-22 NOTE — NURSING NOTE
Chief Complaint   Patient presents with    Port Draw     Labs collected from port by RN. Vitals taken. Checked in for appointment(s).      Port accessed with 20 gauge 3/4 inch flat needle by RN, labs collected, line flushed with saline and heparin.  Vitals taken. Pt checked in for appointment(s).    Carly Ellis RN

## 2024-03-25 ENCOUNTER — INFUSION THERAPY VISIT (OUTPATIENT)
Dept: ONCOLOGY | Facility: CLINIC | Age: 24
End: 2024-03-25
Attending: PEDIATRICS
Payer: COMMERCIAL

## 2024-03-25 ENCOUNTER — NURSE TRIAGE (OUTPATIENT)
Dept: ONCOLOGY | Facility: CLINIC | Age: 24
End: 2024-03-25
Payer: COMMERCIAL

## 2024-03-25 ENCOUNTER — PATIENT OUTREACH (OUTPATIENT)
Dept: CARE COORDINATION | Facility: CLINIC | Age: 24
End: 2024-03-25
Payer: COMMERCIAL

## 2024-03-25 VITALS
RESPIRATION RATE: 16 BRPM | DIASTOLIC BLOOD PRESSURE: 84 MMHG | OXYGEN SATURATION: 94 % | TEMPERATURE: 98.1 F | HEART RATE: 69 BPM | SYSTOLIC BLOOD PRESSURE: 122 MMHG

## 2024-03-25 DIAGNOSIS — G81.10 SPASTIC HEMIPLEGIA, UNSPECIFIED ETIOLOGY, UNSPECIFIED LATERALITY (H): ICD-10-CM

## 2024-03-25 DIAGNOSIS — D57.00 SICKLE CELL PAIN CRISIS (H): Primary | ICD-10-CM

## 2024-03-25 PROCEDURE — 96375 TX/PRO/DX INJ NEW DRUG ADDON: CPT

## 2024-03-25 PROCEDURE — 96376 TX/PRO/DX INJ SAME DRUG ADON: CPT

## 2024-03-25 PROCEDURE — 258N000003 HC RX IP 258 OP 636: Performed by: PEDIATRICS

## 2024-03-25 PROCEDURE — 96361 HYDRATE IV INFUSION ADD-ON: CPT

## 2024-03-25 PROCEDURE — 96374 THER/PROPH/DIAG INJ IV PUSH: CPT

## 2024-03-25 PROCEDURE — 250N000011 HC RX IP 250 OP 636: Performed by: PEDIATRICS

## 2024-03-25 PROCEDURE — 250N000013 HC RX MED GY IP 250 OP 250 PS 637: Performed by: PEDIATRICS

## 2024-03-25 RX ORDER — HEPARIN SODIUM (PORCINE) LOCK FLUSH IV SOLN 100 UNIT/ML 100 UNIT/ML
5 SOLUTION INTRAVENOUS
Status: DISCONTINUED | OUTPATIENT
Start: 2024-03-25 | End: 2024-03-25 | Stop reason: HOSPADM

## 2024-03-25 RX ORDER — ONDANSETRON 2 MG/ML
8 INJECTION INTRAMUSCULAR; INTRAVENOUS EVERY 6 HOURS PRN
Status: DISCONTINUED | OUTPATIENT
Start: 2024-03-25 | End: 2024-03-25 | Stop reason: HOSPADM

## 2024-03-25 RX ORDER — HEPARIN SODIUM,PORCINE 10 UNIT/ML
5 VIAL (ML) INTRAVENOUS
Status: CANCELLED | OUTPATIENT
Start: 2024-04-01

## 2024-03-25 RX ORDER — KETOROLAC TROMETHAMINE 30 MG/ML
30 INJECTION, SOLUTION INTRAMUSCULAR; INTRAVENOUS ONCE
Status: CANCELLED
Start: 2024-04-01 | End: 2024-04-01

## 2024-03-25 RX ORDER — DIPHENHYDRAMINE HCL 25 MG
50 CAPSULE ORAL
Status: CANCELLED
Start: 2024-04-01

## 2024-03-25 RX ORDER — HEPARIN SODIUM (PORCINE) LOCK FLUSH IV SOLN 100 UNIT/ML 100 UNIT/ML
5 SOLUTION INTRAVENOUS
Status: CANCELLED | OUTPATIENT
Start: 2024-04-01

## 2024-03-25 RX ORDER — ONDANSETRON 4 MG/1
8 TABLET, FILM COATED ORAL
Status: CANCELLED
Start: 2024-04-01

## 2024-03-25 RX ORDER — KETOROLAC TROMETHAMINE 30 MG/ML
30 INJECTION, SOLUTION INTRAMUSCULAR; INTRAVENOUS ONCE
Status: COMPLETED | OUTPATIENT
Start: 2024-03-25 | End: 2024-03-25

## 2024-03-25 RX ORDER — ONDANSETRON 2 MG/ML
8 INJECTION INTRAMUSCULAR; INTRAVENOUS EVERY 6 HOURS PRN
Status: CANCELLED
Start: 2024-04-01

## 2024-03-25 RX ORDER — DIPHENHYDRAMINE HCL 25 MG
50 CAPSULE ORAL
Status: COMPLETED | OUTPATIENT
Start: 2024-03-25 | End: 2024-03-25

## 2024-03-25 RX ADMIN — HYDROMORPHONE HYDROCHLORIDE 1 MG: 1 INJECTION, SOLUTION INTRAMUSCULAR; INTRAVENOUS; SUBCUTANEOUS at 12:57

## 2024-03-25 RX ADMIN — SODIUM CHLORIDE, POTASSIUM CHLORIDE, SODIUM LACTATE AND CALCIUM CHLORIDE 1000 ML: 600; 310; 30; 20 INJECTION, SOLUTION INTRAVENOUS at 12:49

## 2024-03-25 RX ADMIN — HYDROMORPHONE HYDROCHLORIDE 1 MG: 1 INJECTION, SOLUTION INTRAMUSCULAR; INTRAVENOUS; SUBCUTANEOUS at 15:01

## 2024-03-25 RX ADMIN — KETOROLAC TROMETHAMINE 30 MG: 30 INJECTION, SOLUTION INTRAMUSCULAR; INTRAVENOUS at 12:51

## 2024-03-25 RX ADMIN — HYDROMORPHONE HYDROCHLORIDE 1 MG: 1 INJECTION, SOLUTION INTRAMUSCULAR; INTRAVENOUS; SUBCUTANEOUS at 13:55

## 2024-03-25 RX ADMIN — ONDANSETRON 8 MG: 2 INJECTION INTRAMUSCULAR; INTRAVENOUS at 12:56

## 2024-03-25 RX ADMIN — Medication 5 ML: at 15:49

## 2024-03-25 RX ADMIN — DIPHENHYDRAMINE HYDROCHLORIDE 50 MG: 25 CAPSULE ORAL at 12:50

## 2024-03-25 NOTE — PROGRESS NOTES
Infusion Nursing Note:  Jennifer Cervantes presents today for IV fluids and pain meds.    Patient seen by provider today: NO    present during visit today: Not Applicable.    Note: Patient had a visit with Patricia Mantilla and received IV fluids, pain med, crizanlizumab, and a blood transfusion on Friday 3/22.  See last infusion notes. Patient states that she continues to have SOB on exertion since her visit on 3/22. SOB is not worse but has not improved. Denies any dizziness, chest pain, cough, or fevers or chills with the shortness of breath. No shortness of breath at rest. O2 saturations on arrival were 90-93% on room air. Provider notified of ongoing sob.      3/25/2024 1307 Written Communication: Patricia Mantilla CNP/Melissa Oliver RN  -She was lower last week too. Let's put some O2 on her while she gets her pain meds since that might sedate her and recheck after she's had her last dose     O2 sats remained in the upper 90s on 2 liters of O2 by nasal canula. RN turned off oxygen after patient's 3rd dose dilaudid. Patient was drowsy at that time. O2 sats dropped to the mid 80s on Room air. RN placed oxygen back on and sats returned to the upper 90s.    3/25/2024 1530 Written Communication: Patricia Mantilla CNP/Melissa Oliver RN  -Have her do lots of deep breathing. If she can maintain her sats 90 or above she can go. If she can't, I'd recommend the ED     RN kept oxygen on and allowed patient  to sleep. About an hour after last dost of dilaudid RN had patient sit up. Oxygen turned off. Patient more awake. Monitored for several minutes and maintained sats in the low 90s (91-95%) on room air.  Patient stated she felt comfortable discharging home.  RN encouraged patient to go to the ED with any chest pain pain, worsening shortness of breath, dizziness, or other new symptoms. Patient verbalized understanding.     Reported generalized pain 9/10 on arrival to infusion and pain improved to 5/10 prior to discharge after toradol and  dilaudid x3    Intravenous Access:  Implanted Port accessed in infusion     Treatment Conditions:  Not Applicable.      Post Infusion Assessment:  Patient tolerated infusion poorly due to :  hypoxia on room air  Blood return noted pre and post infusion.  Site patent and intact, free from redness, edema or discomfort.  No evidence of extravasations.  Access discontinued per protocol.       Discharge Plan:   Patient declined prescription refills.  Discharge instructions reviewed with: Patient.  Patient and/or family verbalized understanding of discharge instructions and all questions answered.  AVS to patient via [employer].  Patient will return 4/19/24 for next appointment.   Patient discharged in stable condition accompanied by: self.  Departure Mode: Ambulatory.      Melissa Oliver RN

## 2024-03-25 NOTE — TELEPHONE ENCOUNTER
"Oncology Nurse Triage - Sickle Cell Pain Crisis:  Situation: Jennifer  calling about Sickle Cell Pain Crisis, requesting to be added on for IV fluids and pain medicine    Background:   Patient's last infusion was 3/22/24  Last clinic visit date: 2/23/24 w/ Patricia Mantilla  Does patient have active treatment plan?  Yes    Assessment of Symptoms:  Onset/Duration of symptoms: 3 day    Is it typical sickle cell pain? Yes   Location: \"every where\"  Character: Sharp           Intensity: 9/10    Any radiation of pain, numbness, tingling, weakness, warmth, swelling, discoloration of arms or legs?No     Fever?No    Chest Pain Present: No     Shortness of breath: No     Other home therapies tried: HEAT/HEATING PAD and WARM BATH     Last home medication taken and when: 0600 Oxycodone    Any Refills Needed?: No     Does patient have transportation & length of time to get to clinic: No -needs transport    Recommendations:   Okayed by Patricia Mantilla via secure chat to come in for infusion.   Added to infusion wait list.   0741 call to pt to offer 1230pm appt in [facility], okayed by Cordelia charge nurse. Pt accepting of time.   0744 message sent to  to help arrange transport and scheduling.     If you do not hear from the infusion center by 2pm then you will not be able to get in for an infusion today. If symptoms worsen while waiting for call back, and/or you experience fever, chills, SOB, chest pain, cough, n/v, dizziness, numbness, swelling, discoloration of extremities, then seek emergency evaluation in Emergency Department.     Please note, if you are late for your appt, you risk losing your infusion appt as it may delay another patient's infusion who arrived on time.          "

## 2024-03-25 NOTE — PROGRESS NOTES
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 She had multiple thromboembolic events in 2021 despite adherent anticoagulation use (though warfarin was perpetually low) and there are concerns for chronic thromboembolic disease but did not have pulmonary HTN on a November 2021 cath. She is maintained on chronic PO opioids and twice-weekly infusion visits (since 1/24/22) but has been able to be maintained on this regimen and has stayed out of the ED most of the time with even rarer admissions for most of 2023.     Interval History:  Jennifer was seen today while in the infusion center per her request to follow-up on concerns from her recent hematology visit on 3/18. She continues to have ongoing concerns about her care in general and her care team. She feels she has been labeled as addicted to pain medications which she feels is unfair because she uses opioids that are prescribed by our clinic. She states she feels fearful to go to the emergency department due to concern that she will be further labeled as addicted to pain medication. She is considering transition care to a different hematologist in our clinic although acknowledges that seeing multiple providers is not necessarily going to be helpful. She is also considering transitioning her care out of the [facility] system. She verbalizes frustration in her inability to obtain a work letter and has concerns she'll need to file for unemployment due to her limited ability to work over the past 3 months.    Sickle Cell  Disease Comprehensive Checklist  Bone Health/Avascular Necrosis Screening/Symptoms (each visit): no new concerns today  Leg Ulcer evaluation (every visit): no  Hypertension (every visit):stable 11/3/23  Last pulmonary evaluation (asthma, AMAN, pulm HTN): 9/28/22, due for follow-up  Stroke/silent cerebral infarct Hx (Y/N): Yes TIA ~2014, first event ~age 1 with full stroke and R sided weakness  Last PCP Visit: 3/6/23  Vaccines:  PCV13: 5/13/19  Pneumovax (PPSV23): 3/04, 10/09, 7/12/19 (next due 7/2024)  Menactra: 4/2010, 9/2015 (MCV done 8/16/21)  MenB: 9/16/15, 5/13/19  Influenza: 10/2/23  Audiology (chelation): done 2/27/24  Comparison to Previous Audiogram dated 6/6/2022:     Pure Tone Thresholds 250-8000 Hz:    RIGHT: stable    LEFT: stable     High Frequency Audiometry 9,000-16,000Hz:     RIGHT: stable    LEFT: stable     Word Recognition Score:    RIGHT: stable    LEFT: stable    Plan last reviewed with patient: 10/2/23    Patient background: 24 yo F, enjoys movies and kids though there are times where she does not really want to talk to people. Does not have a lot of social support at home.     Sickle Cell Disease History  Primary Hematologist Team: Jose Rafael Duncan  PCP: none  Genotype: SS  Acute Pain Crisis Treatment: (limiting IV)   ER   Dilaudid 1 mg IV q1h up to 3 doses  Toradol 30 mg IV x1   Maintenance IV fluids with LR  Other: Zofran 8 mg IV PRN nausea  Inpatient:  PCA Dilaudid 1 hr q30 minutes, no basal rate  Toradol 30 mg x6h x 4 hr  LR maintenance x 1-2 days  Other Medications: Zofran  ASA  Supportive Care: Docusate, Senna  Chronic Pain Medications:    Home regimen: oxycodone 15 mg p.o. q.4-6 hours p.r.n. breakthrough pain.  She also continues on Voltaren gel, and Zoloft among other medications.    -Also benefits from mental health visits, acupuncture  Baseline Hemoglobin: 7 g/dl without chronic transfusions  Hydroxyurea use: Yes  H/O blood transfusions: Yes, several (iron overload) Most recent  11/20/2021  H/O Transfusion Reactions: no  Antibodies:none  H/O Acute Chest Syndrome: Yes  Last episode:9/05/22 (previously 4/26/21, 10/2019)   ICU/intubation: not with 9/2022 admission  H/O Stroke: Yes (managed with chronic transfusions in the past, stopped late Spring 2020)  H/O VTE: Yes (2/2021)  H/O Cholecystectomy or Splenectomy: no  H/O Asthma, Pulm HTN, AVN, Leg Ulcers, Nephropathy, Retinopathy, etc: Iron overload, asthma, chronic lung disease, physical limitations from early stroke    ---------------------------------------  Jennifer Cervantes's Goals (did not review today)    1-3 month goal:  Return to work, hopefully with shorter shifts. Would like to complete [facility] paperwork to keep her job safe.    6 month goal:      12 month goal:      Disease-specific goal(s):  Decrease frequency of ED and infusion center visits. Ultimately would like to decrease oxycodone use.  ---------------------------------------      Current Outpatient Medications   Medication Sig Dispense Refill    acetaminophen (TYLENOL) 325 MG tablet Take 2 tablets (650 mg) by mouth every 6 hours as needed for mild pain (Patient not taking: Reported on 3/18/2024) 120 tablet 3    albuterol (PROAIR HFA/PROVENTIL HFA/VENTOLIN HFA) 108 (90 Base) MCG/ACT inhaler Inhale 2 puffs into the lungs every 6 hours as needed for shortness of breath or wheezing (Patient not taking: Reported on 3/18/2024) 8.5 g 3    albuterol (PROVENTIL) (2.5 MG/3ML) 0.083% neb solution Take 2 vials (5 mg) by nebulization every 6 hours as needed for shortness of breath or wheezing (Patient not taking: Reported on 3/18/2024) 90 mL 3    aspirin (ASA) 81 MG chewable tablet Take 1 tablet (81 mg) by mouth 2 times daily 60 tablet 11    budesonide-formoterol (SYMBICORT) 160-4.5 MCG/ACT Inhaler Inhale 2 puffs twice daily plus 1-2 puffs as needed. May use up to 12 puffs per day. 20.4 g 11    budesonide-formoterol (SYMBICORT) 160-4.5 MCG/ACT Inhaler Inhale 2 puffs into the lungs 2 times daily  10.2 g 3    cetirizine (ZYRTEC) 10 MG tablet Take 1 tablet (10 mg) by mouth daily 30 tablet 1    deferasirox (JADENU) 360 MG tablet Take 4 tablets (1,440 mg) by mouth every evening 120 tablet 4    diphenhydrAMINE (BENADRYL) 25 MG capsule Take 1 capsule (25 mg) by mouth every 6 hours as needed for itching or allergies 30 capsule 0    EPINEPHrine (ANY BX GENERIC EQUIV) 0.3 MG/0.3ML injection 2-pack Inject 0.3 mLs (0.3 mg) into the muscle as needed for anaphylaxis (Patient not taking: Reported on 3/18/2024) 1 each 1    Hydroxyurea 1000 MG TABS Take 3,000 mg by mouth daily 90 tablet 3    ibuprofen (ADVIL/MOTRIN) 600 MG tablet Take 600 mg by mouth every 6 hours as needed for moderate pain Using rarely, 2x/week at most      melatonin 5 MG tablet Take 1 tablet (5 mg) by mouth nightly as needed for sleep 30 tablet 1    methocarbamol (ROBAXIN) 750 MG tablet Take 1 tablet (750 mg) by mouth 4 times daily as needed for muscle spasms (during sickle pain crises. Okay to take scheduled while in pain) 60 tablet 1    naloxone (NARCAN) 4 MG/0.1ML nasal spray Spray 1 spray (4 mg) into one nostril alternating nostrils as needed for opioid reversal every 2-3 minutes until assistance arrives (Patient not taking: Reported on 3/18/2024) 0.2 mL 0    naloxone (NARCAN) 4 MG/0.1ML nasal spray Spray 4 mg into one nostril alternating nostrils as needed for opioid reversal every 2-3 minutes until assistance arrives (Patient not taking: Reported on 2/5/2024)      omeprazole (PRILOSEC) 20 MG DR capsule Take 1 capsule (20 mg) by mouth daily (Patient not taking: Reported on 3/18/2024) 30 capsule 0    ondansetron (ZOFRAN) 8 MG tablet Take 1 tablet (8 mg) by mouth every 8 hours as needed for nausea 30 tablet 1    oxyCODONE IR (ROXICODONE) 10 MG tablet Take 1 tablet (10 mg) by mouth every 4 hours as needed for severe pain or breakthrough pain Goal 4 per day. Max 6 per day. 20 tablet 0       Past Medical History  Past Medical History:   Diagnosis Date     Anxiety     Bleeding disorder (H24)     Blood clotting disorder (H24)     Cerebral infarction (H) 2015    Cognitive developmental delay     low IQ. 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 side (H)     right hand contractures    Iron overload due to repeated red blood cell transfusions     Migraines     Multiple subsegmental pulmonary emboli without acute cor pulmonale (H) 02/01/2021    Oppositional defiant behavior     Presence of intrauterine contraceptive device 2/18/2020    Superficial venous thrombosis of arm, right 03/25/2021    Uncomplicated asthma      Past Surgical History:   Procedure Laterality Date    AS INSERT TUNNELED CV 2 CATH W/O PORT/PUMP      CHOLECYSTECTOMY      CV RIGHT HEART CATH MEASUREMENTS RECORDED N/A 11/18/2021    Procedure: Right Heart Cath;  Surgeon: Jackson Stauffer MD;  Location:  HEART CARDIAC CATH LAB    INSERT PORT VASCULAR ACCESS Left 4/21/2021    Procedure: INSERTION, VASCULAR ACCESS PORT (NOT SURE ON SIDE UNTIL REMOVAL);  Surgeon: Rajan More MD;  Location: [facility] [place]    IR CHEST PORT PLACEMENT > 5 YRS OF AGE  4/21/2021    IR CVC NON TUNNEL LINE REMOVAL  5/6/2021    IR CVC NON TUNNEL PLACEMENT > 5 YRS  04/07/2020    IR CVC NON TUNNEL PLACEMENT > 5 YRS  4/30/2021    IR CVC NON TUNNEL PLACEMENT > 5 YRS  9/7/2022    IR PORT REMOVAL LEFT  4/21/2021    REMOVE PORT VASCULAR ACCESS Left 4/21/2021    Procedure: REMOVAL, VASCULAR ACCESS PORT LEFT;  Surgeon: Rajan More MD;  Location: [facility] [place]    REPAIR TENDON ELBOW Right 10/02/2019    Procedure: Right Elbow Flexor Lengthening, Flexor Pronator Slide Of Wrist and Finger, Thumb Adductor Lengthening;  Surgeon: Anai Franco MD;  Location: [facility] OR    TONSILLECTOMY Bilateral 10/02/2019    Procedure: Bilateral Tonsillectomy;  Surgeon: Farhana Guy MD;  Location:  OR    ZZC BREAST REDUCTION  (INCLUDES LIPO) TIER 3 Bilateral 04/23/2019     Allergies   Allergen Reactions    Contrast Dye      Hives and breathing issues    Fish-Derived Products Hives    Seafood Hives    Adhesive Tape Hives     Primipore dressing causes hives    Gadolinium     Iodinated Contrast Media      Social History   Social History     Tobacco Use    Smoking status: Never     Passive exposure: Never    Smokeless tobacco: Never   Substance Use Topics    Alcohol use: Not Currently     Alcohol/week: 0.0 standard drinks of alcohol    Drug use: Never   Her mom lives next door to her.  Past medical history and social history were reviewed.    Physical Examination:  /84   Pulse 108   Temp 98  F (36.7  C)   Resp 16   Wt 67.6 kg (149 lb)   SpO2 95%   BMI 25.58 kg/m        Wt Readings from Last 10 Encounters:   03/22/24 67.6 kg (149 lb)   03/18/24 69 kg (152 lb 3.2 oz)   03/17/24 70.3 kg (155 lb)   03/08/24 68 kg (150 lb)   03/01/24 68 kg (150 lb)   02/03/24 67.1 kg (148 lb)   01/26/24 66.2 kg (145 lb 14.4 oz)   01/12/24 66.2 kg (146 lb)   01/06/24 67.6 kg (149 lb)   01/04/24 67.9 kg (149 lb 12.8 oz)     General: Pleasant female, NAD  Eyes: EOMI, PERRL  Respiratory: Normal effort, no adventitious breath sounds  Neurologic: Grossly nonfocal. A/O x 4.  Skin: No rashes, petechiae, or bruising noted on exposed skin. Port accessed in left chest  Psych: tearful, inconsolable     Laboratory Data:  Most Recent 3 CBC's:  Recent Labs   Lab Test 03/22/24  1045 03/20/24  1154 03/17/24  0349 03/13/24  0032   WBC 11.3* 11.9* 14.7* 13.5*   HGB 7.4* 8.1* 7.0* 7.0*   MCV 89 92 90 86    379 327 386   ANEUTAUTO 8.3 8.8*  --  8.9*    Most Recent 3 BMP's:  Recent Labs   Lab Test 03/22/24  1045 03/17/24  0349 03/13/24  0032 03/08/24  0939 03/01/24  1418    137 136   < > 137   POTASSIUM 4.0 3.8 3.5   < > 3.6   CHLORIDE 103 104 105   < > 103   CO2 21* 22 22   < > 22   BUN 9.3 6.1 8.0   < > 7.3   CR 0.49* 0.48* 0.50*   < > 0.47*   ANIONGAP 12 11  9   < > 12   MICAH 9.0 8.5* 8.5*   < > 8.8   * 86 90   < > 82   PROTTOTAL  --  6.9 6.8  --  7.5   ALBUMIN  --  4.3 4.2  --  4.5    < > = values in this interval not displayed.    Most Recent 2 LFT's:  Recent Labs   Lab Test 03/17/24  0349 03/13/24  0032   AST 71* 58*   ALT 49 42   ALKPHOS 66 63   BILITOTAL 4.5* 3.8*    Most Recent TSH and T4:No lab results found.  Phos/Mag:  Lab Results   Component Value Date    PHOS 4.8 (H) 05/13/2023    PHOS 5.0 (H) 05/12/2023    PHOS 3.6 02/21/2021    MAG 2.0 11/11/2021    MAG 1.7 02/21/2021    MAG 2.1 02/02/2021       Latest Reference Range & Units 12/28/23 11:11 01/26/24 11:05 03/18/24 12:02   Ferritin 6 - 175 ng/mL 5,430 (H) 5,234 (H) 3,847 (H)     I reviewed the above labs today.    Assessment and Plan:  1. Sickle Cell HgbSS Disease  2. Acute pain crises  3. Chronic Pain  4. 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 History of CVA  7. Hearing loss  8. Nausea/vomiting     Jennifer is seen today for an interim visit to continue to discuss goals of care and concern over her recent hematology visit with Dr. Duncan on 3/18/24. Please see his note for additional details. She voices specific anxiety and frustration over the fact that she has been labeled as an addict in regard to her opioid use, despite efforts for clarification on the meaning of this term during her recent visit. We discussed her desire to seek care elsewhere or with a different provider in clinic. She specifically has asked to see Dr. Slaes who is unable to accommodate Jennifer as a new patient at this time which I relayed to her, along with the fact that he did review her case and would support the same recommendations outlined by Dr. Duncan to make active efforts to reduce her opioid use. I also personally expressed that I support these efforts and confirmed that ultimately Jennifer does share this goal. I stressed that we do not need to make an  immediate changes to her plan today but that I would like her to come in to her visits with a focused approach in an effort to move forward with her care rather than become stagnant within our goal-setting.     Regarding her ability to return to work, we discussed that prohibiting her to work for an extended time frame sets her up to remain out of employment for longer than necessary due to the fact that we can not determine a definitive date in which she will feel physically capable enough to complete her shifts, particularly in the setting of chronic pain. I agreed to providing a work letter limiting her return for another two weeks but that this would need to be reevaluated in short intervals, even weekly if necessary.     At this time she would like to continue care with our clinic under guidance of her current hematology team including myself and Dr. Duncan. I will see her in 2 weeks for close follow-up.     -------------------------------------  Plan:  -Work letter provided today to remain out of work for 2 weeks. Need for reassessment prior to receiving additional letters. Would recommend she pursue formal FMLA in the future.  -Upper GI endoscopy for evaluation of persistent n/v  -Continue Hydrea to 3000mg daily to help lessen frequency of sickle cell pain.   -Continue monthly crizanlizumab infusions.  -Continue slow taper of oxycodone. Currently receiving oxycodone 10 mg every 4 hours PRN, qty 20. Plan to tapering to qty 15 when she is agreeable.  -She can self-reduce infusion days/week and we will continue to keep the cap at 2/week for now.  -Continue infusion center visits limited to two times per week (Mondays and Fridays ideally although still needs to call to request). Continue diligent home management with current medications, heat, rest, compression, warm baths. Methocarbamol was recently added which Jennifer is utilizing and finds helpful.  -If unable to manage at home can go to ED  with continued  plan to use IV Dilaudid and take a break from ketamine (10/2/23). No PCA use and goal for any admission would still be to discharge by 5 days or less  - Continue Jadenu. Repeat Ferriscan in 4-6 months (January-March 2024). Will consider deferiprone after Ferriscan  -Continue aspirin BID  -RTC with Dr. Duncan in ~1 month, scheduled for 4/29/24    60 minutes spent on the date of the encounter doing chart review, review of test results, interpretation of tests, patient visit, and documentation     Patricia Mantilla, CNP

## 2024-03-25 NOTE — PATIENT INSTRUCTIONS
-go to the ED if you have any chest pain, dizziness, worsening shortness of breath, or other new symptoms        [facility] Triage and after hours / weekends / holidays:  [phone]    Please call the triage or after hours line if you experience a temperature greater than or equal to 100.4, shaking chills, have uncontrolled nausea, vomiting and/or diarrhea, dizziness, shortness of breath, chest pain, bleeding, unexplained bruising, or if you have any other new/concerning symptoms, questions or concerns.      If you are having any concerning symptoms or wish to speak to a provider before your next infusion visit, please call triage to notify them so we can adequately serve you.     If you need a refill on a narcotic prescription or other medication, please call before your infusion appointment.             March 2024 Sunday Monday Tuesday Wednesday Thursday Friday Saturday                            1    Admission   1:31 PM   Venancio Nava MD   [facility] Emergency Department   (Discharge: 3/1/2024) 2       3     4  Happy Birthday!    SPEC INFUSION 3 HR (180 MIN)   9:00 AM   (180 min.)   [facility] INFUSION NURSE   [facility] 5     6    SPEC INFUSION 3 HR (180 MIN)   8:30 AM   (180 min.)   [facility] INFUSION NURSE   [facility] 7     8    Admission   8:54 AM   Ifeanyi Valdez MD   [facility] Emergency Department   (Discharge: 3/8/2024)    XR CHEST 2 VIEWS  10:25 AM   (20 min.)   URXR3   [facility] 9       10     11    BMT INFUSION 3 HR (180 MIN)  12:00 PM   (180 min.)    BMT INFUSION NURSE   [facility] [place] 12    Admission  11:37 PM   Andrew Chou MD   [facility] Emergency Department   (Discharge: 3/13/2024) 13     14    US LWR EXT VENOUS DUPLEX RIGHT   3:55 PM   (30 min.)   [email or web address]   [facility] Imaging  [facility] 15    BMT INFUSION 3 HR (180 MIN)  12:00 PM   (180 min.)   UC BMT INFUSION NURSE   [facility] [place] 16       17    Admission  12:57 AM   [facility] Emergency Department   (Discharge: 3/17/2024)    XR CHEST 2 VIEWS   4:25 AM   (20 min.)   URXR3   [facility] 18    LAB CENTRAL  11:45 AM   (15 min.)   [facility] DRAW   [facility]    BMT INFUSION 3 HR (180 MIN)  12:30 PM   (180 min.)   [facility] BMT INFUSION NURSE   [facility] [place]    RETURN CCSL   1:15 PM   (30 min.)   Eric Duncan MD   [facility] 19     20    LAB CENTRAL  12:00 PM   (15 min.)   [facility] DRAW   [facility] 21     22    LAB CENTRAL  11:00 AM   (15 min.)   [facility] DRAW   [facility]    ONC INFUSION 6 HR (360 MIN)  11:30 AM   (360 min.)    ONC INFUSION NURSE   [facility]    RETURN CCSL  12:45 PM   (45 min.)   Patricia Mantilla CNP   [facility] 23       24     25    ONC INFUSION 3 HR (180 MIN)  12:30 PM   (180 min.)    ONC INFUSION NURSE   [facility] 26     27     28    NEUROTOXIN   7:45 AM   (60 min.)   Katherine Pierce MD   [facility] 29     30       31 April 2024 Sunday Monday Tuesday Wednesday Thursday Friday Saturday        1     2     3     4     5     6       7     8     9     10     11     12     13       14     15     16     17     18     19    LAB CENTRAL  11:00 AM   (15 min.)    [facility] DRAW   [facility]    ONC INFUSION 4 HR (240 MIN)  11:30 AM   (240 min.)   [facility] ONC INFUSION NURSE   [facility] 20       21     22     23     24      25     26     27       28     29    LAB CENTRAL  11:00 AM   (15 min.)   [facility] LAB DRAW   [facility]    RETURN CCSL  11:15 AM   (30 min.)   Eric Duncan MD   [facility] 30                                      No results found for this or any previous visit (from the past 24 hour(s)).

## 2024-03-25 NOTE — PROGRESS NOTES
Social Work - Transportation  [facility]    Data/Intervention:  Patient Name: Jennifer Cervantes Goes By: Jennifer    /Age: 2000 (24 year old)    Referral From: [facility]  Reason for Referral:  support requested for patient transportation needs for today's appointment.  Assessment:  called [employer] to arrange ride through patient's insurance. [employer] arranged  for patient from home with [employer]. Patient will need to call [phone] when ready for return ride home.  Plan: Patient is aware of the transportation plan.  available to assist with any other needs.    CARLOS Chavez,LGSW  Hematology/Oncology Social Worker  Phone:[phone] Pager: [phone]

## 2024-03-27 ENCOUNTER — NURSE TRIAGE (OUTPATIENT)
Dept: ONCOLOGY | Facility: CLINIC | Age: 24
End: 2024-03-27
Payer: COMMERCIAL

## 2024-03-27 ENCOUNTER — INFUSION THERAPY VISIT (OUTPATIENT)
Dept: INFUSION THERAPY | Facility: CLINIC | Age: 24
End: 2024-03-27
Attending: PEDIATRICS
Payer: COMMERCIAL

## 2024-03-27 ENCOUNTER — PATIENT OUTREACH (OUTPATIENT)
Dept: CARE COORDINATION | Facility: CLINIC | Age: 24
End: 2024-03-27
Payer: COMMERCIAL

## 2024-03-27 VITALS
DIASTOLIC BLOOD PRESSURE: 75 MMHG | HEART RATE: 98 BPM | TEMPERATURE: 98 F | OXYGEN SATURATION: 94 % | SYSTOLIC BLOOD PRESSURE: 122 MMHG | RESPIRATION RATE: 18 BRPM

## 2024-03-27 DIAGNOSIS — D57.00 SICKLE CELL PAIN CRISIS (H): ICD-10-CM

## 2024-03-27 DIAGNOSIS — D57.00 SICKLE CELL PAIN CRISIS (H): Primary | ICD-10-CM

## 2024-03-27 DIAGNOSIS — G81.10 SPASTIC HEMIPLEGIA, UNSPECIFIED ETIOLOGY, UNSPECIFIED LATERALITY (H): ICD-10-CM

## 2024-03-27 PROCEDURE — 250N000013 HC RX MED GY IP 250 OP 250 PS 637: Performed by: PEDIATRICS

## 2024-03-27 PROCEDURE — 258N000003 HC RX IP 258 OP 636: Performed by: PEDIATRICS

## 2024-03-27 PROCEDURE — 96376 TX/PRO/DX INJ SAME DRUG ADON: CPT

## 2024-03-27 PROCEDURE — 96361 HYDRATE IV INFUSION ADD-ON: CPT

## 2024-03-27 PROCEDURE — 250N000011 HC RX IP 250 OP 636: Performed by: PEDIATRICS

## 2024-03-27 PROCEDURE — 96375 TX/PRO/DX INJ NEW DRUG ADDON: CPT

## 2024-03-27 PROCEDURE — 96374 THER/PROPH/DIAG INJ IV PUSH: CPT

## 2024-03-27 RX ORDER — HEPARIN SODIUM (PORCINE) LOCK FLUSH IV SOLN 100 UNIT/ML 100 UNIT/ML
5 SOLUTION INTRAVENOUS
Status: DISCONTINUED | OUTPATIENT
Start: 2024-03-27 | End: 2024-03-27 | Stop reason: HOSPADM

## 2024-03-27 RX ORDER — ONDANSETRON 2 MG/ML
8 INJECTION INTRAMUSCULAR; INTRAVENOUS EVERY 6 HOURS PRN
Status: CANCELLED
Start: 2024-04-01

## 2024-03-27 RX ORDER — KETOROLAC TROMETHAMINE 30 MG/ML
30 INJECTION, SOLUTION INTRAMUSCULAR; INTRAVENOUS ONCE
Status: COMPLETED | OUTPATIENT
Start: 2024-03-27 | End: 2024-03-27

## 2024-03-27 RX ORDER — ONDANSETRON 2 MG/ML
8 INJECTION INTRAMUSCULAR; INTRAVENOUS EVERY 6 HOURS PRN
Status: DISCONTINUED | OUTPATIENT
Start: 2024-03-27 | End: 2024-03-27 | Stop reason: HOSPADM

## 2024-03-27 RX ORDER — DIPHENHYDRAMINE HCL 25 MG
50 CAPSULE ORAL
Status: COMPLETED | OUTPATIENT
Start: 2024-03-27 | End: 2024-03-27

## 2024-03-27 RX ORDER — HEPARIN SODIUM (PORCINE) LOCK FLUSH IV SOLN 100 UNIT/ML 100 UNIT/ML
5 SOLUTION INTRAVENOUS
Status: CANCELLED | OUTPATIENT
Start: 2024-04-01

## 2024-03-27 RX ORDER — DIPHENHYDRAMINE HCL 25 MG
50 CAPSULE ORAL
Status: CANCELLED
Start: 2024-04-01

## 2024-03-27 RX ORDER — ONDANSETRON 4 MG/1
8 TABLET, FILM COATED ORAL
Status: CANCELLED
Start: 2024-04-01

## 2024-03-27 RX ORDER — HEPARIN SODIUM,PORCINE 10 UNIT/ML
5 VIAL (ML) INTRAVENOUS
Status: CANCELLED | OUTPATIENT
Start: 2024-04-01

## 2024-03-27 RX ORDER — KETOROLAC TROMETHAMINE 30 MG/ML
30 INJECTION, SOLUTION INTRAMUSCULAR; INTRAVENOUS ONCE
Status: CANCELLED
Start: 2024-04-01 | End: 2024-04-01

## 2024-03-27 RX ADMIN — HYDROMORPHONE HYDROCHLORIDE 1 MG: 1 INJECTION, SOLUTION INTRAMUSCULAR; INTRAVENOUS; SUBCUTANEOUS at 16:25

## 2024-03-27 RX ADMIN — KETOROLAC TROMETHAMINE 30 MG: 30 INJECTION, SOLUTION INTRAMUSCULAR; INTRAVENOUS at 15:27

## 2024-03-27 RX ADMIN — DIPHENHYDRAMINE HYDROCHLORIDE 50 MG: 25 CAPSULE ORAL at 17:51

## 2024-03-27 RX ADMIN — ONDANSETRON 8 MG: 2 INJECTION INTRAMUSCULAR; INTRAVENOUS at 15:23

## 2024-03-27 RX ADMIN — SODIUM CHLORIDE, POTASSIUM CHLORIDE, SODIUM LACTATE AND CALCIUM CHLORIDE 1000 ML: 600; 310; 30; 20 INJECTION, SOLUTION INTRAVENOUS at 15:21

## 2024-03-27 RX ADMIN — Medication 5 ML: at 17:52

## 2024-03-27 RX ADMIN — HYDROMORPHONE HYDROCHLORIDE 1 MG: 1 INJECTION, SOLUTION INTRAMUSCULAR; INTRAVENOUS; SUBCUTANEOUS at 17:27

## 2024-03-27 RX ADMIN — HYDROMORPHONE HYDROCHLORIDE 1 MG: 1 INJECTION, SOLUTION INTRAMUSCULAR; INTRAVENOUS; SUBCUTANEOUS at 15:26

## 2024-03-27 NOTE — TELEPHONE ENCOUNTER
Oncology Nurse Triage - Sickle Cell Pain Crisis:  Situation: Jennifer  calling about Sickle Cell Pain Crisis, requesting to be added on for IV fluids and pain medicine    Background:   Patient's last infusion was 3/25/24   Last clinic visit date:3/22/24 with Patricia Mantilla CNP  Does patient have active treatment plan?  Yes    Assessment of Symptoms:  Onset/Duration of symptoms: earlier this a.m.    Is it typical sickle cell pain? Yes   Location: all over, back and shoulders  Character: Dull ache           Intensity: 8.5/10    Any radiation of pain, numbness, tingling, weakness, warmth, swelling, discoloration of arms or legs?No     Fever?No  Chest Pain Present: No   Shortness of breath: No     Other home therapies tried: HEAT/HEATING PAD and WARM BATH   Last home medication taken and when: 0630 oxycodone    Any Refills Needed?: Yes     Does patient have transportation & length of time to get to clinic: No 25 minutes.    Recommendations:   Added to Infusion Wait list    If you do not hear from the infusion center by 2pm then you will not be able to get in for an infusion today. If symptoms worsen while waiting for call back, and/or you experience fever, chills, SOB, chest pain, cough, n/v, dizziness, numbness, swelling, discoloration of extremities, then seek emergency evaluation in Emergency Department.     Pt voiced understanding.    [facility] can offer apt at 1500.    1230: Called Jennifer and offered apt. Confirmed she can come at 1500.   Message sent to  to assist with transportation.  Message sent to [facility] to schedule.  Updated Infusion charge nurse.     Routed to Care Team.

## 2024-03-27 NOTE — TELEPHONE ENCOUNTER
Narcotic Refill Request  Person Requesting Refill: Jennifer    Medication(s) requested:  oxycodone IR 10 mg tablet  Last fill date and by whom:  Patricia Mantilla CNP on 3/21/24  What pain is the medication treating: sickle cell pain  How is the medication being taken?:1 tab Q 6H  Does pt have enough for today? Yes. Is due Friday.  Is pain being adequately controlled on the current regimen?: Yes  Experiencing any side effects from medication?: no    Date of most recent appointment:  3/22/24 with Patricia Mantilla CNP  Any No Show Visits:No  Next appointment:   4/8/24 with Patricia Mantilla CNP     Reviewed: No    Routed/Paged provider: Patricia Mantilla CNP

## 2024-03-27 NOTE — PROGRESS NOTES
Social Work - Transportation  [facility]    Data/Intervention:  Patient Name: Jennifer Cervantes Goes By: Jennifer    /Age: 2000 (24 year old)    Referral From: [facility]   Reason for Referral:  support requested for patient transportation needs for today's appointment.  Assessment:  called [employer] to arrange ride through patient's insurance. [employer] arranged  for patient from home with [employer]. Patient will need to call [phone] when ready for return ride home.  Plan: Patient is aware of the transportation plan.  available to assist with any other needs.    CARLOS Chavez,LGSW  Hematology/Oncology Social Worker  Phone:[phone] Pager: [phone]

## 2024-03-27 NOTE — PROGRESS NOTES
Nursing Note  Jennifer Cervantes presents today to [facility] for:   Chief Complaint   Patient presents with    Infusion     IVF, pain meds       During today's [facility] appointment, orders from Dr. Duncan were completed.  Frequency: as needed    Progress note:  Patient identification verified by name and date of birth.  Assessment completed.  Vitals recorded in Doc Flowsheets.  Patient was provided with education regarding medication/procedure and possible side effects.  Patient verbalized understanding.     present during visit today: Not Applicable.    Treatment Conditions: Non-applicable.    Infusion length and rate:  infusion given over approximately  2 hours    Labs: were not ordered for this appointment.    Vascular access: port accessed today.    Is the next appt scheduled? prn    Post Infusion Assessment:  Patient tolerated infusion without incident.     Discharge Plan:   Follow up plan of care with: ordering provider as scheduled.  Discharge instructions were reviewed with patient.  Patient/representative verbalized understanding of discharge instructions and all questions answered.  Patient discharged from [facility] in stable condition.    Yina Ford RN    Administrations This Visit       diphenhydrAMINE (BENADRYL) capsule 50 mg       Admin Date  03/27/2024 Action  $Given Dose  50 mg Route  Oral Documented By  Yina Ford RN              heparin 100 unit/mL injection 5 mL       Admin Date  03/27/2024 Action  $Given Dose  5 mL Route  Intracatheter Documented By  Yina Ford RN              HYDROmorphone (DILAUDID) injection 1 mg       Admin Date  03/27/2024 Action  $Given Dose  1 mg Route  Intravenous Documented By  Yina Ford RN               Admin Date  03/27/2024 Action  $Given Dose  1 mg Route  Intravenous Documented By  Yina Ford RN               Admin Date  03/27/2024 Action  $Given  Dose  1 mg Route  Intravenous Documented By  Yina Ford, JOE              ketorolac (TORADOL) injection 30 mg       Admin Date  03/27/2024 Action  $Given Dose  30 mg Route  Intravenous Documented By  Yina Ford, JOE              lactated ringers BOLUS 1,000 mL       Admin Date  03/27/2024 Action  $New Bag Dose  1,000 mL Rate  500 mL/hr Route  Intravenous Documented By  Yina Ford, JOE              ondansetron (ZOFRAN) injection 8 mg       Admin Date  03/27/2024 Action  $Given Dose  8 mg Route  Intravenous Documented By  Yina Ford, JOE                      /75 (BP Location: Left arm, Patient Position: Semi-Short's, Cuff Size: Adult Regular)   Pulse 98   Temp 98  F (36.7  C) (Oral)   Resp 18   SpO2 94%

## 2024-03-28 RX ORDER — OXYCODONE HYDROCHLORIDE 10 MG/1
10 TABLET ORAL EVERY 4 HOURS PRN
Qty: 20 TABLET | Refills: 0 | Status: SHIPPED | OUTPATIENT
Start: 2024-03-29 | End: 2024-04-03

## 2024-03-28 NOTE — TELEPHONE ENCOUNTER
Pt calling to check on status of refill request. Pt stating she just wants to make sure she is able to  prescription on Friday.

## 2024-03-29 ENCOUNTER — OFFICE VISIT (OUTPATIENT)
Dept: ONCOLOGY | Facility: CLINIC | Age: 24
End: 2024-03-29
Attending: STUDENT IN AN ORGANIZED HEALTH CARE EDUCATION/TRAINING PROGRAM
Payer: COMMERCIAL

## 2024-03-29 ENCOUNTER — HOSPITAL ENCOUNTER (EMERGENCY)
Facility: CLINIC | Age: 24
Discharge: HOME | End: 2024-03-29
Attending: EMERGENCY MEDICINE | Admitting: EMERGENCY MEDICINE
Payer: COMMERCIAL

## 2024-03-29 VITALS
DIASTOLIC BLOOD PRESSURE: 64 MMHG | TEMPERATURE: 98.5 F | SYSTOLIC BLOOD PRESSURE: 112 MMHG | HEART RATE: 80 BPM | OXYGEN SATURATION: 91 % | RESPIRATION RATE: 18 BRPM

## 2024-03-29 DIAGNOSIS — D57.00 SICKLE CELL PAIN CRISIS (H): ICD-10-CM

## 2024-03-29 DIAGNOSIS — G81.10 SPASTIC HEMIPLEGIA, UNSPECIFIED ETIOLOGY, UNSPECIFIED LATERALITY (H): Primary | ICD-10-CM

## 2024-03-29 DIAGNOSIS — I69.351 HEMIPLEGIA AND HEMIPARESIS FOLLOWING CEREBRAL INFARCTION AFFECTING RIGHT DOMINANT SIDE (H): ICD-10-CM

## 2024-03-29 DIAGNOSIS — D57.1 HB-SS DISEASE WITHOUT CRISIS (H): ICD-10-CM

## 2024-03-29 LAB
ANION GAP SERPL CALCULATED.3IONS-SCNC: 12 MMOL/L (ref 7–15)
BASOPHILS # BLD AUTO: 0.3 10E3/UL (ref 0–0.2)
BASOPHILS NFR BLD AUTO: 2 %
BUN SERPL-MCNC: 8.3 MG/DL (ref 6–20)
CALCIUM SERPL-MCNC: 8.7 MG/DL (ref 8.6–10)
CHLORIDE SERPL-SCNC: 102 MMOL/L (ref 98–107)
CREAT SERPL-MCNC: 0.53 MG/DL (ref 0.51–0.95)
DEPRECATED HCO3 PLAS-SCNC: 21 MMOL/L (ref 22–29)
EGFRCR SERPLBLD CKD-EPI 2021: >90 ML/MIN/1.73M2
EOSINOPHIL # BLD AUTO: 0.6 10E3/UL (ref 0–0.7)
EOSINOPHIL NFR BLD AUTO: 4 %
ERYTHROCYTE [DISTWIDTH] IN BLOOD BY AUTOMATED COUNT: 24.2 % (ref 10–15)
GLUCOSE SERPL-MCNC: 91 MG/DL (ref 70–99)
HCT VFR BLD AUTO: 20.9 % (ref 35–47)
HGB BLD-MCNC: 7.4 G/DL (ref 11.7–15.7)
IMM GRANULOCYTES # BLD: 0.1 10E3/UL
IMM GRANULOCYTES NFR BLD: 1 %
LYMPHOCYTES # BLD AUTO: 3 10E3/UL (ref 0.8–5.3)
LYMPHOCYTES NFR BLD AUTO: 24 %
MCH RBC QN AUTO: 30.3 PG (ref 26.5–33)
MCHC RBC AUTO-ENTMCNC: 35.4 G/DL (ref 31.5–36.5)
MCV RBC AUTO: 86 FL (ref 78–100)
MONOCYTES # BLD AUTO: 1 10E3/UL (ref 0–1.3)
MONOCYTES NFR BLD AUTO: 8 %
NEUTROPHILS # BLD AUTO: 7.6 10E3/UL (ref 1.6–8.3)
NEUTROPHILS NFR BLD AUTO: 61 %
NRBC # BLD AUTO: 0.4 10E3/UL
NRBC BLD AUTO-RTO: 4 /100
PLATELET # BLD AUTO: 503 10E3/UL (ref 150–450)
POTASSIUM SERPL-SCNC: 3.9 MMOL/L (ref 3.4–5.3)
RBC # BLD AUTO: 2.44 10E6/UL (ref 3.8–5.2)
RETICS # AUTO: 0.11 10E6/UL (ref 0.03–0.1)
RETICS/RBC NFR AUTO: 21.1 % (ref 0.5–2)
SODIUM SERPL-SCNC: 135 MMOL/L (ref 135–145)
WBC # BLD AUTO: 12.5 10E3/UL (ref 4–11)

## 2024-03-29 PROCEDURE — 96361 HYDRATE IV INFUSION ADD-ON: CPT | Performed by: EMERGENCY MEDICINE

## 2024-03-29 PROCEDURE — 85045 AUTOMATED RETICULOCYTE COUNT: CPT | Performed by: EMERGENCY MEDICINE

## 2024-03-29 PROCEDURE — 95874 GUIDE NERV DESTR NEEDLE EMG: CPT | Performed by: STUDENT IN AN ORGANIZED HEALTH CARE EDUCATION/TRAINING PROGRAM

## 2024-03-29 PROCEDURE — 250N000011 HC RX IP 250 OP 636: Performed by: EMERGENCY MEDICINE

## 2024-03-29 PROCEDURE — 96374 THER/PROPH/DIAG INJ IV PUSH: CPT | Mod: 59 | Performed by: EMERGENCY MEDICINE

## 2024-03-29 PROCEDURE — 258N000003 HC RX IP 258 OP 636: Performed by: EMERGENCY MEDICINE

## 2024-03-29 PROCEDURE — 96376 TX/PRO/DX INJ SAME DRUG ADON: CPT | Performed by: EMERGENCY MEDICINE

## 2024-03-29 PROCEDURE — 250N000020 HC RX IP 250 OP 636 J0585: Mod: JZ | Performed by: PEDIATRICS

## 2024-03-29 PROCEDURE — 64644 CHEMODENERV 1 EXTREM 5/> MUS: CPT | Performed by: STUDENT IN AN ORGANIZED HEALTH CARE EDUCATION/TRAINING PROGRAM

## 2024-03-29 PROCEDURE — 99285 EMERGENCY DEPT VISIT HI MDM: CPT | Performed by: EMERGENCY MEDICINE

## 2024-03-29 PROCEDURE — 80048 BASIC METABOLIC PNL TOTAL CA: CPT | Performed by: EMERGENCY MEDICINE

## 2024-03-29 PROCEDURE — 36415 COLL VENOUS BLD VENIPUNCTURE: CPT | Performed by: EMERGENCY MEDICINE

## 2024-03-29 PROCEDURE — 99284 EMERGENCY DEPT VISIT MOD MDM: CPT | Mod: 25 | Performed by: EMERGENCY MEDICINE

## 2024-03-29 PROCEDURE — 96375 TX/PRO/DX INJ NEW DRUG ADDON: CPT | Mod: 59 | Performed by: EMERGENCY MEDICINE

## 2024-03-29 PROCEDURE — 64644 CHEMODENERV 1 EXTREM 5/> MUS: CPT

## 2024-03-29 PROCEDURE — 85025 COMPLETE CBC W/AUTO DIFF WBC: CPT | Performed by: EMERGENCY MEDICINE

## 2024-03-29 RX ORDER — KETOROLAC TROMETHAMINE 15 MG/ML
15 INJECTION, SOLUTION INTRAMUSCULAR; INTRAVENOUS ONCE
Status: COMPLETED | OUTPATIENT
Start: 2024-03-29 | End: 2024-03-29

## 2024-03-29 RX ORDER — HEPARIN SODIUM (PORCINE) LOCK FLUSH IV SOLN 100 UNIT/ML 100 UNIT/ML
5 SOLUTION INTRAVENOUS
Status: CANCELLED | OUTPATIENT
Start: 2024-04-01

## 2024-03-29 RX ORDER — KETOROLAC TROMETHAMINE 30 MG/ML
30 INJECTION, SOLUTION INTRAMUSCULAR; INTRAVENOUS ONCE
Status: CANCELLED
Start: 2024-04-01 | End: 2024-04-01

## 2024-03-29 RX ORDER — HYDROXYUREA 500 MG/1
CAPSULE ORAL
Status: ON HOLD | COMMUNITY
Start: 2024-02-23 | End: 2024-04-29

## 2024-03-29 RX ORDER — HEPARIN SODIUM,PORCINE 10 UNIT/ML
5 VIAL (ML) INTRAVENOUS
Status: CANCELLED | OUTPATIENT
Start: 2024-04-01

## 2024-03-29 RX ORDER — ONDANSETRON 4 MG/1
8 TABLET, FILM COATED ORAL
Status: CANCELLED
Start: 2024-04-01

## 2024-03-29 RX ORDER — HEPARIN SODIUM (PORCINE) LOCK FLUSH IV SOLN 100 UNIT/ML 100 UNIT/ML
5-10 SOLUTION INTRAVENOUS
Status: DISCONTINUED | OUTPATIENT
Start: 2024-03-29 | End: 2024-03-29 | Stop reason: HOSPADM

## 2024-03-29 RX ORDER — ONDANSETRON 2 MG/ML
8 INJECTION INTRAMUSCULAR; INTRAVENOUS EVERY 6 HOURS PRN
Status: CANCELLED
Start: 2024-04-01

## 2024-03-29 RX ORDER — DIPHENHYDRAMINE HCL 25 MG
50 CAPSULE ORAL
Status: CANCELLED
Start: 2024-04-01

## 2024-03-29 RX ORDER — ONDANSETRON 2 MG/ML
4 INJECTION INTRAMUSCULAR; INTRAVENOUS EVERY 30 MIN PRN
Status: DISCONTINUED | OUTPATIENT
Start: 2024-03-29 | End: 2024-03-29 | Stop reason: HOSPADM

## 2024-03-29 RX ADMIN — HYDROMORPHONE HYDROCHLORIDE 1 MG: 1 INJECTION, SOLUTION INTRAMUSCULAR; INTRAVENOUS; SUBCUTANEOUS at 05:24

## 2024-03-29 RX ADMIN — ONABOTULINUMTOXINA 300 UNITS: 100 INJECTION, POWDER, LYOPHILIZED, FOR SOLUTION INTRADERMAL; INTRAMUSCULAR at 08:35

## 2024-03-29 RX ADMIN — SODIUM CHLORIDE, POTASSIUM CHLORIDE, SODIUM LACTATE AND CALCIUM CHLORIDE 1000 ML: 600; 310; 30; 20 INJECTION, SOLUTION INTRAVENOUS at 04:36

## 2024-03-29 RX ADMIN — HYDROMORPHONE HYDROCHLORIDE 1 MG: 1 INJECTION, SOLUTION INTRAMUSCULAR; INTRAVENOUS; SUBCUTANEOUS at 04:37

## 2024-03-29 RX ADMIN — HEPARIN 5 ML: 100 SYRINGE at 05:40

## 2024-03-29 RX ADMIN — ONDANSETRON 4 MG: 2 INJECTION INTRAMUSCULAR; INTRAVENOUS at 03:47

## 2024-03-29 RX ADMIN — KETOROLAC TROMETHAMINE 15 MG: 15 INJECTION, SOLUTION INTRAMUSCULAR; INTRAVENOUS at 03:46

## 2024-03-29 RX ADMIN — HYDROMORPHONE HYDROCHLORIDE 1 MG: 1 INJECTION, SOLUTION INTRAMUSCULAR; INTRAVENOUS; SUBCUTANEOUS at 03:54

## 2024-03-29 ASSESSMENT — COLUMBIA-SUICIDE SEVERITY RATING SCALE - C-SSRS
2. HAVE YOU ACTUALLY HAD ANY THOUGHTS OF KILLING YOURSELF IN THE PAST MONTH?: NO
6. HAVE YOU EVER DONE ANYTHING, STARTED TO DO ANYTHING, OR PREPARED TO DO ANYTHING TO END YOUR LIFE?: NO
1. IN THE PAST MONTH, HAVE YOU WISHED YOU WERE DEAD OR WISHED YOU COULD GO TO SLEEP AND NOT WAKE UP?: NO

## 2024-03-29 ASSESSMENT — ACTIVITIES OF DAILY LIVING (ADL)
ADLS_ACUITY_SCORE: 35
ADLS_ACUITY_SCORE: 37
ADLS_ACUITY_SCORE: 35

## 2024-03-29 NOTE — ED TRIAGE NOTES
Patient reports pain in left arm since this morning, continues to get worse, tried oxycodone at home with no relief      Triage Assessment (Adult)       Row Name 03/29/24 0259          Triage Assessment    Airway WDL WDL        Respiratory WDL    Respiratory WDL WDL        Skin Circulation/Temperature WDL    Skin Circulation/Temperature WDL WDL        Cardiac WDL    Cardiac WDL WDL        Peripheral/Neurovascular WDL    Peripheral Neurovascular WDL WDL        Cognitive/Neuro/Behavioral WDL    Cognitive/Neuro/Behavioral WDL WDL

## 2024-03-29 NOTE — ED PROVIDER NOTES
ED Provider Note  [facility]      History     Chief Complaint   Patient presents with    Sickle Cell Pain Crisis     Patient reports pain in left arm since this morning, continues to get worse, tried oxycodone at home with no relief      HPI  Jennifer Cervantes is a 24 year old female who presents to us with chief complaint of sickle cell pain.  Her current pain is in her back and her left arm.  This started earlier today.  She has tried her oral medications without relief.  No fevers.  No nausea or vomiting.  No chest pain or shortness of breath.    Past Medical History  Past Medical History:   Diagnosis Date    Anxiety     Bleeding disorder (H24)     Blood clotting disorder (H24)     Cerebral infarction (H) 2015    Cognitive developmental delay     low IQ. 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 side (H)     right hand contractures    Iron overload due to repeated red blood cell transfusions     Migraines     Multiple subsegmental pulmonary emboli without acute cor pulmonale (H) 02/01/2021    Oppositional defiant behavior     Presence of intrauterine contraceptive device 2/18/2020    Superficial venous thrombosis of arm, right 03/25/2021    Uncomplicated asthma      Past Surgical History:   Procedure Laterality Date    AS INSERT TUNNELED CV 2 CATH W/O PORT/PUMP      CHOLECYSTECTOMY      CV RIGHT HEART CATH MEASUREMENTS RECORDED N/A 11/18/2021    Procedure: Right Heart Cath;  Surgeon: Jackson Stauffer MD;  Location:  HEART CARDI[facility] CATH LAB    INSERT PORT VASCULAR ACCESS Left 4/21/2021    Procedure: INSERTION, VASCULAR ACCESS PORT (NOT SURE ON SIDE UNTIL REMOVAL);  Surgeon: Rajan More MD;  Location: [facility] OR    IR CHEST PORT PLACEMENT > 5 YRS OF AGE  4/21/2021    IR CVC NON TUNNEL LINE REMOVAL  5/6/2021    IR CVC NON TUNNEL PLACEMENT > 5 YRS  04/07/2020    IR  CVC NON TUNNEL PLACEMENT > 5 YRS  4/30/2021    IR CVC NON TUNNEL PLACEMENT > 5 YRS  9/7/2022    IR PORT REMOVAL LEFT  4/21/2021    REMOVE PORT VASCULAR ACCESS Left 4/21/2021    Procedure: REMOVAL, VASCULAR ACCESS PORT LEFT;  Surgeon: Rajan More MD;  Location: [facility] OR    REPAIR TENDON ELBOW Right 10/02/2019    Procedure: Right Elbow Flexor Lengthening, Flexor Pronator Slide Of Wrist and Finger, Thumb Adductor Lengthening;  Surgeon: Anai Franco MD;  Location: [facility] OR    TONSILLECTOMY Bilateral 10/02/2019    Procedure: Bilateral Tonsillectomy;  Surgeon: Farhana Guy MD;  Location: [facility] OR    ZZC BREAST REDUCTION (INCLUDES LIPO) TIER 3 Bilateral 04/23/2019     acetaminophen (TYLENOL) 325 MG tablet  albuterol (PROAIR HFA/PROVENTIL HFA/VENTOLIN HFA) 108 (90 Base) MCG/ACT inhaler  albuterol (PROVENTIL) (2.5 MG/3ML) 0.083% neb solution  aspirin (ASA) 81 MG chewable tablet  budesonide-formoterol (SYMBICORT) 160-4.5 MCG/ACT Inhaler  budesonide-formoterol (SYMBICORT) 160-4.5 MCG/ACT Inhaler  cetirizine (ZYRTEC) 10 MG tablet  deferasirox (JADENU) 360 MG tablet  diphenhydrAMINE (BENADRYL) 25 MG capsule  EPINEPHrine (ANY BX GENERIC EQUIV) 0.3 MG/0.3ML injection 2-pack  Hydroxyurea 1000 MG TABS  ibuprofen (ADVIL/MOTRIN) 600 MG tablet  melatonin 5 MG tablet  methocarbamol (ROBAXIN) 750 MG tablet  naloxone (NARCAN) 4 MG/0.1ML nasal spray  naloxone (NARCAN) 4 MG/0.1ML nasal spray  omeprazole (PRILOSEC) 20 MG DR capsule  ondansetron (ZOFRAN) 8 MG tablet  oxyCODONE IR (ROXICODONE) 10 MG tablet      Allergies   Allergen Reactions    Contrast Dye      Hives and breathing issues    Fish-Derived Products Hives    Seafood Hives    Adhesive Tape Hives     Primipore dressing causes hives    Gadolinium     Iodinated Contrast Media      Family History  Family History   Problem Relation Age of Onset    Sickle Cell Trait Mother     Hypertension Mother     Asthma Mother     Sickle Cell Trait Father     Glaucoma No family  hx of     Macular Degeneration No family hx of     Diabetes No family hx of     Gout No family hx of      Social History   Social History     Tobacco Use    Smoking status: Never     Passive exposure: Never    Smokeless tobacco: Never   Substance Use Topics    Alcohol use: Not Currently     Alcohol/week: 0.0 standard drinks of alcohol    Drug use: Never         A medically appropriate review of systems was performed with pertinent positives and negatives noted in the HPI, and all other systems negative.    Physical Exam   BP: 114/78  Pulse: 93  Temp: 98.5  F (36.9  C)  Resp: 18  SpO2: 93 %  Physical Exam  Constitutional:       General: She is not in acute distress.     Appearance: She is not diaphoretic.   HENT:      Head: Normocephalic and atraumatic.      Right Ear: External ear normal.      Left Ear: External ear normal.      Nose: Nose normal.   Eyes:      Extraocular Movements: Extraocular movements intact.      Conjunctiva/sclera: Conjunctivae normal.   Cardiovascular:      Rate and Rhythm: Normal rate and regular rhythm.      Heart sounds: Normal heart sounds.   Pulmonary:      Effort: Pulmonary effort is normal. No respiratory distress.      Breath sounds: Normal breath sounds.   Abdominal:      General: There is no distension.      Palpations: Abdomen is soft.      Tenderness: There is no abdominal tenderness.   Musculoskeletal:         General: No swelling or deformity.      Cervical back: Normal range of motion and neck supple.   Skin:     General: Skin is warm and dry.   Neurological:      Mental Status: Mental status is at baseline.      Comments: Alert, oriented   Psychiatric:         Mood and Affect: Mood normal.         Behavior: Behavior normal.           ED Course, Procedures, & Data      Procedures          Results for orders placed or performed during the hospital encounter of 03/29/24   Basic metabolic panel     Status: Abnormal   Result Value Ref Range    Sodium 135 135 - 145 mmol/L     Potassium 3.9 3.4 - 5.3 mmol/L    Chloride 102 98 - 107 mmol/L    Carbon Dioxide (CO2) 21 (L) 22 - 29 mmol/L    Anion Gap 12 7 - 15 mmol/L    Urea Nitrogen 8.3 6.0 - 20.0 mg/dL    Creatinine 0.53 0.51 - 0.95 mg/dL    GFR Estimate >90 >60 mL/min/1.73m2    Calcium 8.7 8.6 - 10.0 mg/dL    Glucose 91 70 - 99 mg/dL   Reticulocyte count     Status: Abnormal   Result Value Ref Range    % Reticulocyte 21.1 (H) 0.5 - 2.0 %    Absolute Reticulocyte 0.106 (H) 0.025 - 0.095 10e6/uL   CBC with platelets and differential     Status: Abnormal   Result Value Ref Range    WBC Count 12.5 (H) 4.0 - 11.0 10e3/uL    RBC Count 2.44 (L) 3.80 - 5.20 10e6/uL    Hemoglobin 7.4 (L) 11.7 - 15.7 g/dL    Hematocrit 20.9 (L) 35.0 - 47.0 %    MCV 86 78 - 100 fL    MCH 30.3 26.5 - 33.0 pg    MCHC 35.4 31.5 - 36.5 g/dL    RDW 24.2 (H) 10.0 - 15.0 %    Platelet Count 503 (H) 150 - 450 10e3/uL    % Neutrophils 61 %    % Lymphocytes 24 %    % Monocytes 8 %    % Eosinophils 4 %    % Basophils 2 %    % Immature Granulocytes 1 %    NRBCs per 100 WBC 4 (H) <1 /100    Absolute Neutrophils 7.6 1.6 - 8.3 10e3/uL    Absolute Lymphocytes 3.0 0.8 - 5.3 10e3/uL    Absolute Monocytes 1.0 0.0 - 1.3 10e3/uL    Absolute Eosinophils 0.6 0.0 - 0.7 10e3/uL    Absolute Basophils 0.3 (H) 0.0 - 0.2 10e3/uL    Absolute Immature Granulocytes 0.1 <=0.4 10e3/uL    Absolute NRBCs 0.4 10e3/uL   CBC with platelets differential     Status: Abnormal    Narrative    The following orders were created for panel order CBC with platelets differential.  Procedure                               Abnormality         Status                     ---------                               -----------         ------                     CBC with platelets and d...[[id number]]  Abnormal            Final result                 Please view results for these tests on the individual orders.     Medications   lactated ringers BOLUS 1,000 mL (1,000 mLs Intravenous $New Bag 3/29/24 3560)   ondansetron  (ZOFRAN) injection 4 mg (4 mg Intravenous $Given 3/29/24 0246)   HYDROmorphone (DILAUDID) injection 1 mg (1 mg Intravenous $Given 3/29/24 6553)   ketorolac (TORADOL) injection 15 mg (15 mg Intravenous $Given 3/29/24 3982)     Labs Ordered and Resulted from Time of ED Arrival to Time of ED Departure   BASIC METABOLIC PANEL - Abnormal       Result Value    Sodium 135      Potassium 3.9      Chloride 102      Carbon Dioxide (CO2) 21 (*)     Anion Gap 12      Urea Nitrogen 8.3      Creatinine 0.53      GFR Estimate >90      Calcium 8.7      Glucose 91     RETICULOCYTE COUNT - Abnormal    % Reticulocyte 21.1 (*)     Absolute Reticulocyte 0.106 (*)    CBC WITH PLATELETS AND DIFFERENTIAL - Abnormal    WBC Count 12.5 (*)     RBC Count 2.44 (*)     Hemoglobin 7.4 (*)     Hematocrit 20.9 (*)     MCV 86      MCH 30.3      MCHC 35.4      RDW 24.2 (*)     Platelet Count 503 (*)     % Neutrophils 61      % Lymphocytes 24      % Monocytes 8      % Eosinophils 4      % Basophils 2      % Immature Granulocytes 1      NRBCs per 100 WBC 4 (*)     Absolute Neutrophils 7.6      Absolute Lymphocytes 3.0      Absolute Monocytes 1.0      Absolute Eosinophils 0.6      Absolute Basophils 0.3 (*)     Absolute Immature Granulocytes 0.1      Absolute NRBCs 0.4       No orders to display          Medical Decision Making  The patient's presentation is strongly suggestive of high complexity (a chronic illness severe exacerbation, progression, or side effect of treatment).    The patient's evaluation involved:  review of external note(s) from 3+ sources (Most recent H&P in addition to clinic and ED note)  review of 2 test result(s) ordered prior to this encounter (Most recent BMP and CBC)    The patient's management involved high risk (a parenteral controlled substance).    Assessment & Plan    24-year-old female presents to us with a chief complaint of sickle cell pain.  She has no fever no systemic symptoms to suggest acute chest syndrome or  other infection.  She was given pain relief via her normal care plan including Dilaudid Toradol fluids Zofran.  She is feeling better.  Hemoglobin is at her baseline.  We will discharge her to follow-up with primary care.    I have reviewed the nursing notes. I have reviewed the findings, diagnosis, plan and need for follow up with the patient.    New Prescriptions    No medications on file       Final diagnoses:   Sickle cell pain crisis (H)       Mykel Luz  [facility] EMERGENCY DEPARTMENT  3/29/2024     Mykel Luz DO  03/29/24 0518

## 2024-03-29 NOTE — PROGRESS NOTES
PM&R Botox Procedure Note    Chief Complaint: Spastic Hemiparesis    There were no vitals taken for this visit.       Current Outpatient Medications:     hydroxyurea (HYDREA) 500 MG capsule, , Disp: , Rfl:     acetaminophen (TYLENOL) 325 MG tablet, Take 2 tablets (650 mg) by mouth every 6 hours as needed for mild pain (Patient not taking: Reported on 3/18/2024), Disp: 120 tablet, Rfl: 3    albuterol (PROAIR HFA/PROVENTIL HFA/VENTOLIN HFA) 108 (90 Base) MCG/ACT inhaler, Inhale 2 puffs into the lungs every 6 hours as needed for shortness of breath or wheezing (Patient not taking: Reported on 3/18/2024), Disp: 8.5 g, Rfl: 3    albuterol (PROVENTIL) (2.5 MG/3ML) 0.083% neb solution, Take 2 vials (5 mg) by nebulization every 6 hours as needed for shortness of breath or wheezing (Patient not taking: Reported on 3/18/2024), Disp: 90 mL, Rfl: 3    aspirin (ASA) 81 MG chewable tablet, Take 1 tablet (81 mg) by mouth 2 times daily, Disp: 60 tablet, Rfl: 11    budesonide-formoterol (SYMBICORT) 160-4.5 MCG/ACT Inhaler, Inhale 2 puffs twice daily plus 1-2 puffs as needed. May use up to 12 puffs per day., Disp: 20.4 g, Rfl: 11    budesonide-formoterol (SYMBICORT) 160-4.5 MCG/ACT Inhaler, Inhale 2 puffs into the lungs 2 times daily, Disp: 10.2 g, Rfl: 3    cetirizine (ZYRTEC) 10 MG tablet, Take 1 tablet (10 mg) by mouth daily, Disp: 30 tablet, Rfl: 1    deferasirox (JADENU) 360 MG tablet, Take 4 tablets (1,440 mg) by mouth every evening, Disp: 120 tablet, Rfl: 4    diphenhydrAMINE (BENADRYL) 25 MG capsule, Take 1 capsule (25 mg) by mouth every 6 hours as needed for itching or allergies, Disp: 30 capsule, Rfl: 0    EPINEPHrine (ANY BX GENERIC EQUIV) 0.3 MG/0.3ML injection 2-pack, Inject 0.3 mLs (0.3 mg) into the muscle as needed for anaphylaxis (Patient not taking: Reported on 3/18/2024), Disp: 1 each, Rfl: 1    Hydroxyurea 1000 MG TABS, Take 3,000 mg by mouth daily, Disp: 90 tablet, Rfl: 3    ibuprofen (ADVIL/MOTRIN) 600 MG tablet,  Take 600 mg by mouth every 6 hours as needed for moderate pain Using rarely, 2x/week at most, Disp: , Rfl:     melatonin 5 MG tablet, Take 1 tablet (5 mg) by mouth nightly as needed for sleep, Disp: 30 tablet, Rfl: 1    methocarbamol (ROBAXIN) 750 MG tablet, Take 1 tablet (750 mg) by mouth 4 times daily as needed for muscle spasms (during sickle pain crises. Okay to take scheduled while in pain), Disp: 60 tablet, Rfl: 1    naloxone (NARCAN) 4 MG/0.1ML nasal spray, Spray 1 spray (4 mg) into one nostril alternating nostrils as needed for opioid reversal every 2-3 minutes until assistance arrives (Patient not taking: Reported on 3/18/2024), Disp: 0.2 mL, Rfl: 0    naloxone (NARCAN) 4 MG/0.1ML nasal spray, Spray 4 mg into one nostril alternating nostrils as needed for opioid reversal every 2-3 minutes until assistance arrives (Patient not taking: Reported on 2/5/2024), Disp: , Rfl:     omeprazole (PRILOSEC) 20 MG DR capsule, Take 1 capsule (20 mg) by mouth daily (Patient not taking: Reported on 3/18/2024), Disp: 30 capsule, Rfl: 0    ondansetron (ZOFRAN) 8 MG tablet, Take 1 tablet (8 mg) by mouth every 8 hours as needed for nausea, Disp: 30 tablet, Rfl: 1    oxyCODONE IR (ROXICODONE) 10 MG tablet, Take 1 tablet (10 mg) by mouth every 4 hours as needed for severe pain or breakthrough pain Goal 4 per day. Max 6 per day., Disp: 20 tablet, Rfl: 0    Current Facility-Administered Medications:     botulinum toxin type A (BOTOX) 100 units injection 400 Units, 400 Units, Intramuscular, Q90 Days, Eric Duncan MD, 300 Units at 03/29/24 0835        Allergies   Allergen Reactions    Contrast Dye      Hives and breathing issues    Fish-Derived Products Hives    Seafood Hives    Adhesive Tape Hives     Primipore dressing causes hives    Gadolinium     Iodinated Contrast Media         PHYSICAL EXAM      Strength: 4+/5 with right shoulder abduction, 4/5 with right elbow flexion, unable to assess wrist flexion due to  "contracture. 4/5 with  strength on right. 5/5 left shoulder abduction, elbow extension, wrist extension and  strength/finger intrinsics. 4/5 with right hip flexion, 4/5 with right knee extension, 3/5 with right ankle dorsiflexion, 4+/5 with right EHL, plantar flexion. 5/5 with all muscle groups in left lower extremity.    ROM is 120 degrees with right shoulder abduction, about 130 degrees with right elbow extension.     Tone with MAS-   2/4 with right shoulder abduction   3/4 with right finger flexors/intrinsics   2/4 with right knee flexion.    Significant right wrist contracture with minimal extension.   Sensory: intact to light touch throughout all dermatomes in bilateral lower extremities.      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      Since last time seen in this clinic, patient has visited 10 times the ED for pain crisis. Last seen by oncology on 3/22/2024 by Dr. Mantilla,where they had long discussion about tapering oxycodone. Patient also thinking to pursue FMLA (refer to note for more details).    Last seen 23 for last set of injections- injected total of 300 U, which was an increase of 50 Units from previous dose of 250 units.     Since then, she has had significant improvement in tone improving mobility and ADLs over the last several weeks. Feels a little tighter in her right elbow, forearm pronation and finger flexion, started to notice the tightness coming back since the end of November/beginning of December      RESPONSE TO PREVIOUS TREATMENT:  Significant improvement in tone that lasted \"several\" weeks    BOTULINUM NEUROTOXIN INJECTION PROCEDURES:    VERIFICATION OF PATIENT IDENTIFICATION  Responsible Person:  RUIZ DENNISB: verified  Full Name: verified    INDICATIONS FOR PROCEDURES:  Jennifer Cervantes is a 21 year old patient with spasticity affecting the right upper extremity and right lower extremity " secondary to a diagnosis of sickle cell disease and previous CVA with resulting spastic hemiparesis with associated pain, loss of joint motion, loss of volitional motor control, hygiene difficulty, difficulty with activities of daily living and difficulty with ambulation and transfers.    Her baseline symptoms have been recalcitrant to oral medications and conservative therapy.  She is here today for reinjection with Botox.    GOAL OF PROCEDURE:  The goal of this procedure is to improve increase active range of motion, improve volitional motor control, decrease pain  and enhance functional independence associated with spasticity.    TOTAL DOSE ADMINISTERED:  Increased dose today based on increased tone with MAS with right elbow flexion, finger flexion.    Dose Administered:  300 units Botox (Botulinum Toxin Type A)       1:1 Dilution     Diluent Used:  Preservative Free Normal Saline  Total Volume of Diluent Used:  3.0 ml  Lot no: [id number]  Exp: 06/2026    CONSENT:  The risks, benefits, and treatment options were discussed with Jennifer Cervantes and she agreed to proceed.    EQUIPMENT USED:  Needle-27mm stimulating/recording  EMG/NCS Machine    SKIN PREPARATION:  Skin preparation was performed using an alcohol wipe.    GUIDANCE DESCRIPTION:  Electro-myographic guidance was necessary throughout the procedure to accurately identify all areas of spastic muscles while avoiding injection of non-spastic muscles and underlying muscles , neighboring nerves and nearby vascular structures.     AREA/MUSCLE INJECTED:  Right biceps- 50 U at 2 sites  Right BR- 50 U  Right pronator teres- 50 U  Right FDS- 50 U  Right FCR- 40 U  Right biceps femoris- 60 U at 2 sites    RESPONSE TO PROCEDURE:  Jennifer Cervantes tolerated the procedure well and there were no immediate complications. She was allowed to recover for an appropriate period of time and was discharged home in stable condition.     Patient seen and discussed with attending physician  Dr. Pierce. The attending provider was present for the entire procedure documented.    Katherine Pierce MD  Physical Medicine & Rehabilitation

## 2024-03-29 NOTE — LETTER
3/29/2024         RE: Jennifer Cervantes  [address]  [facility] [address]        Dear Colleague,    Thank you for referring your patient, Jennifer Cervantes, to the [facility]. Please see a copy of my visit note below.    PM&R Botox Procedure Note    Chief Complaint: Spastic Hemiparesis    There were no vitals taken for this visit.       Current Outpatient Medications:     hydroxyurea (HYDREA) 500 MG capsule, , Disp: , Rfl:     acetaminophen (TYLENOL) 325 MG tablet, Take 2 tablets (650 mg) by mouth every 6 hours as needed for mild pain (Patient not taking: Reported on 3/18/2024), Disp: 120 tablet, Rfl: 3    albuterol (PROAIR HFA/PROVENTIL HFA/VENTOLIN HFA) 108 (90 Base) MCG/ACT inhaler, Inhale 2 puffs into the lungs every 6 hours as needed for shortness of breath or wheezing (Patient not taking: Reported on 3/18/2024), Disp: 8.5 g, Rfl: 3    albuterol (PROVENTIL) (2.5 MG/3ML) 0.083% neb solution, Take 2 vials (5 mg) by nebulization every 6 hours as needed for shortness of breath or wheezing (Patient not taking: Reported on 3/18/2024), Disp: 90 mL, Rfl: 3    aspirin (ASA) 81 MG chewable tablet, Take 1 tablet (81 mg) by mouth 2 times daily, Disp: 60 tablet, Rfl: 11    budesonide-formoterol (SYMBICORT) 160-4.5 MCG/ACT Inhaler, Inhale 2 puffs twice daily plus 1-2 puffs as needed. May use up to 12 puffs per day., Disp: 20.4 g, Rfl: 11    budesonide-formoterol (SYMBICORT) 160-4.5 MCG/ACT Inhaler, Inhale 2 puffs into the lungs 2 times daily, Disp: 10.2 g, Rfl: 3    cetirizine (ZYRTEC) 10 MG tablet, Take 1 tablet (10 mg) by mouth daily, Disp: 30 tablet, Rfl: 1    deferasirox (JADENU) 360 MG tablet, Take 4 tablets (1,440 mg) by mouth every evening, Disp: 120 tablet, Rfl: 4    diphenhydrAMINE (BENADRYL) 25 MG capsule, Take 1 capsule (25 mg) by mouth every 6 hours as needed for itching or allergies, Disp: 30 capsule, Rfl: 0    EPINEPHrine (ANY BX GENERIC EQUIV) 0.3 MG/0.3ML injection 2-pack,  Inject 0.3 mLs (0.3 mg) into the muscle as needed for anaphylaxis (Patient not taking: Reported on 3/18/2024), Disp: 1 each, Rfl: 1    Hydroxyurea 1000 MG TABS, Take 3,000 mg by mouth daily, Disp: 90 tablet, Rfl: 3    ibuprofen (ADVIL/MOTRIN) 600 MG tablet, Take 600 mg by mouth every 6 hours as needed for moderate pain Using rarely, 2x/week at most, Disp: , Rfl:     melatonin 5 MG tablet, Take 1 tablet (5 mg) by mouth nightly as needed for sleep, Disp: 30 tablet, Rfl: 1    methocarbamol (ROBAXIN) 750 MG tablet, Take 1 tablet (750 mg) by mouth 4 times daily as needed for muscle spasms (during sickle pain crises. Okay to take scheduled while in pain), Disp: 60 tablet, Rfl: 1    naloxone (NARCAN) 4 MG/0.1ML nasal spray, Spray 1 spray (4 mg) into one nostril alternating nostrils as needed for opioid reversal every 2-3 minutes until assistance arrives (Patient not taking: Reported on 3/18/2024), Disp: 0.2 mL, Rfl: 0    naloxone (NARCAN) 4 MG/0.1ML nasal spray, Spray 4 mg into one nostril alternating nostrils as needed for opioid reversal every 2-3 minutes until assistance arrives (Patient not taking: Reported on 2/5/2024), Disp: , Rfl:     omeprazole (PRILOSEC) 20 MG DR capsule, Take 1 capsule (20 mg) by mouth daily (Patient not taking: Reported on 3/18/2024), Disp: 30 capsule, Rfl: 0    ondansetron (ZOFRAN) 8 MG tablet, Take 1 tablet (8 mg) by mouth every 8 hours as needed for nausea, Disp: 30 tablet, Rfl: 1    oxyCODONE IR (ROXICODONE) 10 MG tablet, Take 1 tablet (10 mg) by mouth every 4 hours as needed for severe pain or breakthrough pain Goal 4 per day. Max 6 per day., Disp: 20 tablet, Rfl: 0    Current Facility-Administered Medications:     botulinum toxin type A (BOTOX) 100 units injection 400 Units, 400 Units, Intramuscular, Q90 Days, Eric Duncan MD, 300 Units at 03/29/24 0835        Allergies   Allergen Reactions    Contrast Dye      Hives and breathing issues    Fish-Derived Products Hives    Seafood  "Hives    Adhesive Tape Hives     Primipore dressing causes hives    Gadolinium     Iodinated Contrast Media         PHYSICAL EXAM      Strength: 4+/5 with right shoulder abduction, 4/5 with right elbow flexion, unable to assess wrist flexion due to contracture. 4/5 with  strength on right. 5/5 left shoulder abduction, elbow extension, wrist extension and  strength/finger intrinsics. 4/5 with right hip flexion, 4/5 with right knee extension, 3/5 with right ankle dorsiflexion, 4+/5 with right EHL, plantar flexion. 5/5 with all muscle groups in left lower extremity.    ROM is 120 degrees with right shoulder abduction, about 130 degrees with right elbow extension.     Tone with MAS-   2/4 with right shoulder abduction   3/4 with right finger flexors/intrinsics   2/4 with right knee flexion.    Significant right wrist contracture with minimal extension.   Sensory: intact to light touch throughout all dermatomes in bilateral lower extremities.      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      Since last time seen in this clinic, patient has visited 10 times the ED for pain crisis. Last seen by oncology on 3/22/2024 by Dr. Mantilla,where they had long discussion about tapering oxycodone. Patient also thinking to pursue FMLA (refer to note for more details).    Last seen 01/04/23 for last set of injections- injected total of 300 U, which was an increase of 50 Units from previous dose of 250 units.     Since then, she has had significant improvement in tone improving mobility and ADLs over the last several weeks. Feels a little tighter in her right elbow, forearm pronation and finger flexion, started to notice the tightness coming back since the end of November/beginning of December      RESPONSE TO PREVIOUS TREATMENT:  Significant improvement in tone that lasted \"several\" weeks    BOTULINUM NEUROTOXIN INJECTION " PROCEDURES:    VERIFICATION OF PATIENT IDENTIFICATION  Responsible Person:  RUIZ  : verified  Full Name: verified    INDICATIONS FOR PROCEDURES:  Jennifer Cervantes is a 21 year old patient with spasticity affecting the right upper extremity and right lower extremity secondary to a diagnosis of sickle cell disease and previous CVA with resulting spastic hemiparesis with associated pain, loss of joint motion, loss of volitional motor control, hygiene difficulty, difficulty with activities of daily living and difficulty with ambulation and transfers.    Her baseline symptoms have been recalcitrant to oral medications and conservative therapy.  She is here today for reinjection with Botox.    GOAL OF PROCEDURE:  The goal of this procedure is to improve increase active range of motion, improve volitional motor control, decrease pain  and enhance functional independence associated with spasticity.    TOTAL DOSE ADMINISTERED:  Increased dose today based on increased tone with MAS with right elbow flexion, finger flexion.    Dose Administered:  300 units Botox (Botulinum Toxin Type A)       1:1 Dilution     Diluent Used:  Preservative Free Normal Saline  Total Volume of Diluent Used:  3.0 ml  Lot no: [id number]  Exp: 2026    CONSENT:  The risks, benefits, and treatment options were discussed with Jennifer Cervantes and she agreed to proceed.    EQUIPMENT USED:  Needle-27mm stimulating/recording  EMG/NCS Machine    SKIN PREPARATION:  Skin preparation was performed using an alcohol wipe.    GUIDANCE DESCRIPTION:  Electro-myographic guidance was necessary throughout the procedure to accurately identify all areas of spastic muscles while avoiding injection of non-spastic muscles and underlying muscles , neighboring nerves and nearby vascular structures.     AREA/MUSCLE INJECTED:  Right biceps- 50 U at 2 sites  Right BR- 50 U  Right pronator teres- 50 U  Right FDS- 50 U  Right FCR- 40 U  Right biceps femoris- 60 U at 2 sites    RESPONSE  TO PROCEDURE:  Jennifer Cervantes tolerated the procedure well and there were no immediate complications. She was allowed to recover for an appropriate period of time and was discharged home in stable condition.     Patient seen and discussed with attending physician Dr. Pierce. The attending provider was present for the entire procedure documented.    Katherine Pierce MD  Physical Medicine & Rehabilitation

## 2024-03-29 NOTE — NURSING NOTE
"Oncology Rooming Note    March 29, 2024 7:55 AM   Jennifer Cervantes is a 24 year old female who presents for:    Chief Complaint   Patient presents with    Oncology Clinic Visit     Spastic hemiparesis     Initial Vitals: There were no vitals taken for this visit. Estimated body mass index is 25.58 kg/m  as calculated from the following:    Height as of 3/17/24: 1.626 m (5' 4\").    Weight as of 3/22/24: 67.6 kg (149 lb). There is no height or weight on file to calculate BSA.  Data Unavailable Comment: Data Unavailable   No LMP recorded. Patient has had an implant.  Allergies reviewed: Yes  Medications reviewed: Yes    Medications: Medication refills not needed today.  Pharmacy name entered into [facility]: [place], [place] - [address] [facility] [phone]    Frailty Screening:   Is the patient here for a new oncology consult visit in cancer care? 2. No      Clinical concerns: none.       Jake Hill"

## 2024-03-29 NOTE — PATIENT INSTRUCTIONS
It was nice to see you again today.    1. You received your next set of botox injections.  2. You will be scheduled for your next set of injections in 12 weeks.

## 2024-03-29 NOTE — ED NOTES
Port de access with no issues. Stats are stable after IV dilaudid. Pt is discharge with no new concerns.

## 2024-04-01 ENCOUNTER — PATIENT OUTREACH (OUTPATIENT)
Dept: CARE COORDINATION | Facility: CLINIC | Age: 24
End: 2024-04-01
Payer: COMMERCIAL

## 2024-04-01 ENCOUNTER — NURSE TRIAGE (OUTPATIENT)
Dept: ONCOLOGY | Facility: CLINIC | Age: 24
End: 2024-04-01
Payer: COMMERCIAL

## 2024-04-01 ENCOUNTER — INFUSION THERAPY VISIT (OUTPATIENT)
Dept: INFUSION THERAPY | Facility: CLINIC | Age: 24
End: 2024-04-01
Attending: PEDIATRICS
Payer: COMMERCIAL

## 2024-04-01 VITALS
TEMPERATURE: 97.7 F | SYSTOLIC BLOOD PRESSURE: 110 MMHG | HEART RATE: 81 BPM | RESPIRATION RATE: 16 BRPM | DIASTOLIC BLOOD PRESSURE: 65 MMHG | OXYGEN SATURATION: 92 %

## 2024-04-01 DIAGNOSIS — G81.10 SPASTIC HEMIPLEGIA, UNSPECIFIED ETIOLOGY, UNSPECIFIED LATERALITY (H): ICD-10-CM

## 2024-04-01 DIAGNOSIS — D57.00 SICKLE CELL PAIN CRISIS (H): Primary | ICD-10-CM

## 2024-04-01 PROCEDURE — 250N000013 HC RX MED GY IP 250 OP 250 PS 637: Performed by: PEDIATRICS

## 2024-04-01 PROCEDURE — 96376 TX/PRO/DX INJ SAME DRUG ADON: CPT

## 2024-04-01 PROCEDURE — 258N000003 HC RX IP 258 OP 636: Performed by: PEDIATRICS

## 2024-04-01 PROCEDURE — 96375 TX/PRO/DX INJ NEW DRUG ADDON: CPT

## 2024-04-01 PROCEDURE — 96374 THER/PROPH/DIAG INJ IV PUSH: CPT

## 2024-04-01 PROCEDURE — 250N000011 HC RX IP 250 OP 636: Performed by: PEDIATRICS

## 2024-04-01 PROCEDURE — 96361 HYDRATE IV INFUSION ADD-ON: CPT

## 2024-04-01 RX ORDER — HEPARIN SODIUM (PORCINE) LOCK FLUSH IV SOLN 100 UNIT/ML 100 UNIT/ML
5 SOLUTION INTRAVENOUS
Status: CANCELLED | OUTPATIENT
Start: 2024-07-01

## 2024-04-01 RX ORDER — DIPHENHYDRAMINE HCL 25 MG
50 CAPSULE ORAL
Status: CANCELLED
Start: 2024-07-01

## 2024-04-01 RX ORDER — DIPHENHYDRAMINE HCL 25 MG
50 CAPSULE ORAL
Status: COMPLETED | OUTPATIENT
Start: 2024-04-01 | End: 2024-04-01

## 2024-04-01 RX ORDER — ONDANSETRON 4 MG/1
8 TABLET, ORALLY DISINTEGRATING ORAL
Status: DISCONTINUED | OUTPATIENT
Start: 2024-04-01 | End: 2024-04-01 | Stop reason: HOSPADM

## 2024-04-01 RX ORDER — HEPARIN SODIUM,PORCINE 10 UNIT/ML
5 VIAL (ML) INTRAVENOUS
Status: CANCELLED | OUTPATIENT
Start: 2024-07-01

## 2024-04-01 RX ORDER — ONDANSETRON 2 MG/ML
8 INJECTION INTRAMUSCULAR; INTRAVENOUS EVERY 6 HOURS PRN
Status: CANCELLED
Start: 2024-07-01

## 2024-04-01 RX ORDER — ONDANSETRON 4 MG/1
8 TABLET, FILM COATED ORAL
Status: CANCELLED
Start: 2024-07-01

## 2024-04-01 RX ORDER — KETOROLAC TROMETHAMINE 30 MG/ML
30 INJECTION, SOLUTION INTRAMUSCULAR; INTRAVENOUS ONCE
Status: COMPLETED | OUTPATIENT
Start: 2024-04-01 | End: 2024-04-01

## 2024-04-01 RX ORDER — HEPARIN SODIUM (PORCINE) LOCK FLUSH IV SOLN 100 UNIT/ML 100 UNIT/ML
5 SOLUTION INTRAVENOUS
Status: DISCONTINUED | OUTPATIENT
Start: 2024-04-01 | End: 2024-04-01 | Stop reason: HOSPADM

## 2024-04-01 RX ORDER — ONDANSETRON 2 MG/ML
8 INJECTION INTRAMUSCULAR; INTRAVENOUS EVERY 6 HOURS PRN
Status: DISCONTINUED | OUTPATIENT
Start: 2024-04-01 | End: 2024-04-01 | Stop reason: HOSPADM

## 2024-04-01 RX ORDER — KETOROLAC TROMETHAMINE 30 MG/ML
30 INJECTION, SOLUTION INTRAMUSCULAR; INTRAVENOUS ONCE
Status: CANCELLED
Start: 2024-07-01 | End: 2024-07-01

## 2024-04-01 RX ADMIN — SODIUM CHLORIDE, POTASSIUM CHLORIDE, SODIUM LACTATE AND CALCIUM CHLORIDE 1000 ML: 600; 310; 30; 20 INJECTION, SOLUTION INTRAVENOUS at 09:03

## 2024-04-01 RX ADMIN — HYDROMORPHONE HYDROCHLORIDE 1 MG: 1 INJECTION, SOLUTION INTRAMUSCULAR; INTRAVENOUS; SUBCUTANEOUS at 09:12

## 2024-04-01 RX ADMIN — Medication 5 ML: at 11:40

## 2024-04-01 RX ADMIN — HYDROMORPHONE HYDROCHLORIDE 1 MG: 1 INJECTION, SOLUTION INTRAMUSCULAR; INTRAVENOUS; SUBCUTANEOUS at 11:14

## 2024-04-01 RX ADMIN — KETOROLAC TROMETHAMINE 30 MG: 30 INJECTION, SOLUTION INTRAMUSCULAR; INTRAVENOUS at 09:16

## 2024-04-01 RX ADMIN — DIPHENHYDRAMINE HYDROCHLORIDE 50 MG: 25 CAPSULE ORAL at 09:10

## 2024-04-01 RX ADMIN — ONDANSETRON 8 MG: 2 INJECTION INTRAMUSCULAR; INTRAVENOUS at 09:21

## 2024-04-01 RX ADMIN — HYDROMORPHONE HYDROCHLORIDE 1 MG: 1 INJECTION, SOLUTION INTRAMUSCULAR; INTRAVENOUS; SUBCUTANEOUS at 10:15

## 2024-04-01 NOTE — PROGRESS NOTES
Social Work - Transportation  [facility]    Data/Intervention:  Patient Name: Jennifer Cervantes Goes By: Jennifer    /Age: 2000 (24 year old)    [facility]  Reason for Referral:  support requested for patient transportation needs for today's appointment.  Assessment:  called [employer] to arrange ride through patient's insurance. [employer] arranged for a will call ride home with [employer]. Patient will need to call [phone] when ready for return ride home.  Plan: Patient is aware of the transportation plan.  available to assist with any other needs.    CARLOS Chavez,UDAY  Hematology/Oncology Social Worker  Phone:[phone] Pager: [phone]

## 2024-04-01 NOTE — TELEPHONE ENCOUNTER
Oncology Nurse Triage - Sickle Cell Pain Crisis:    Situation: Jennifer  calling about Sickle Cell Pain Crisis, requesting to be added on for IV fluids and pain medicine    Background:     Patient's last infusion was 3/27/24  Last clinic visit date:3/22/24 Patricia Lunaaislinn   on 3/29/24  Does patient have active treatment plan?  Yes      Assessment of Symptoms:  Onset/Duration of symptoms: 1 day    Is it typical sickle cell pain? Yes   Location: lower back and left arm   Character: Sharp           Intensity: 9/10    Any radiation of pain, numbness, tingling, weakness, warmth, swelling, discoloration of arms or legs?No     Fever?No  (if yes max temperature recorded in last 24 hours):      Chest Pain Present: No     Shortness of breath: No     Other home therapies tried: HEAT/HEATING PAD and WARM BATH     Last home medication taken and when: 6am oxycodone     Any Refills Needed?: No     Does patient have transportation & length of time to get to clinic: No   Can get own transportation if early AM appts or else will need transportation assistance       Recommendations:     Placed on infusion call list     If you do not hear from the infusion center by 2pm then you will not be able to get in for an infusion today. If symptoms worsen while waiting for call back, and/or you experience fever, chills, SOB, chest pain, cough, n/v, dizziness, numbness, swelling, discoloration of extremities, then seek emergency evaluation in Emergency Department.     Please note, if you are late for your appt, you risk losing your infusion appt as it may delay another patient's infusion who arrived on time.

## 2024-04-01 NOTE — PROGRESS NOTES
Nursing Note  Jennifer Cervantes [facility] for:   Chief Complaint   Patient presents with    Infusion     IV Fluids/Meds       During [facility]'s [facility] appointment, orders from Dr JAS Duncan were completed.  Frequency: as needed    Progress note:  Patient identification verified by name and date of birth.  Assessment completed.  Vitals recorded in Doc Flowsheets.  Patient was provided with education regarding medication/procedure and possible side effects.  Patient verbalized understanding.     present during visit today: Not Applicable.    Treatment Conditions: Non-applicable.    Premedications: were not ordered.    Drug Waste Record: No    Infusion length and rate:  infusion given over approximately 2 hours.  Labs: were not ordered for this appointment.    Vascular access: port accessed today.    Is the next appt scheduled? NA    Post Infusion Assessment:  Patient tolerated infusion without incident.  Blood return noted pre and post infusion.  Site patent and intact, free from redness, edema or discomfort.  No evidence of extravasations.  Access discontinued per protocol.     Discharge Plan:   Follow up plan of care with: ordering provider as scheduled.  Discharge instructions were reviewed with patient.  Patient/representative verbalized understanding of discharge instructions and all questions answered.  Patient discharged [facility] in stable condition.  Patient declined AVS.     Sahra Frankel RN    Administrations This Visit       diphenhydrAMINE (BENADRYL) capsule 50 mg       Admin Date  04/01/2024 Action  $Given Dose  50 mg Route  Oral Documented By  Sahra Frankel RN              heparin 100 unit/mL injection 5 mL       Admin Date  04/01/2024 Action  $Given Dose  5 mL Route  Intracatheter Documented By  Sahra Frankel RN              HYDROmorphone (DILAUDID) injection 1 mg        Admin Date  04/01/2024 Action  $Given Dose  1 mg Route  Intravenous Documented By  Sahra Frankel RN               Admin Date  04/01/2024 Action  $Given Dose  1 mg Route  Intravenous Documented By  Sahra Frankel RN               Admin Date  04/01/2024 Action  $Given Dose  1 mg Route  Intravenous Documented By  Sahra Frankel RN              ketorolac (TORADOL) injection 30 mg       Admin Date  04/01/2024 Action  $Given Dose  30 mg Route  Intravenous Documented By  Sahra Frankel RN              lactated ringers BOLUS 1,000 mL       Admin Date  04/01/2024 Action  $New Bag Dose  1,000 mL Rate  500 mL/hr Route  Intravenous Documented By  Sahra Frankel RN              ondansetron (ZOFRAN) injection 8 mg       Admin Date  04/01/2024 Action  $Given Dose  8 mg Route  Intravenous Documented By  Sahra Frankel RN                    /79 (BP Location: Left arm, Patient Position: Sitting, Cuff Size: Adult Regular)   Pulse 94   Temp 97.7  F (36.5  C) (Oral)   Resp 16   SpO2 92%

## 2024-04-01 NOTE — TELEPHONE ENCOUNTER
Sipc can take at 8:30     Call placed to Jennifer she can make it to the 8:30 appt.     Message sent to [facility] to schedule     Message sent to [employer] for ride home

## 2024-04-03 ENCOUNTER — INFUSION THERAPY VISIT (OUTPATIENT)
Dept: INFUSION THERAPY | Facility: CLINIC | Age: 24
End: 2024-04-03
Attending: PEDIATRICS
Payer: COMMERCIAL

## 2024-04-03 ENCOUNTER — NURSE TRIAGE (OUTPATIENT)
Dept: ONCOLOGY | Facility: CLINIC | Age: 24
End: 2024-04-03
Payer: COMMERCIAL

## 2024-04-03 VITALS
SYSTOLIC BLOOD PRESSURE: 130 MMHG | OXYGEN SATURATION: 92 % | TEMPERATURE: 97.9 F | DIASTOLIC BLOOD PRESSURE: 75 MMHG | RESPIRATION RATE: 16 BRPM | HEART RATE: 88 BPM

## 2024-04-03 DIAGNOSIS — G81.10 SPASTIC HEMIPLEGIA, UNSPECIFIED ETIOLOGY, UNSPECIFIED LATERALITY (H): ICD-10-CM

## 2024-04-03 DIAGNOSIS — D57.00 SICKLE CELL PAIN CRISIS (H): ICD-10-CM

## 2024-04-03 DIAGNOSIS — D57.00 SICKLE CELL PAIN CRISIS (H): Primary | ICD-10-CM

## 2024-04-03 PROCEDURE — 250N000011 HC RX IP 250 OP 636: Performed by: PEDIATRICS

## 2024-04-03 PROCEDURE — 96376 TX/PRO/DX INJ SAME DRUG ADON: CPT

## 2024-04-03 PROCEDURE — 96375 TX/PRO/DX INJ NEW DRUG ADDON: CPT

## 2024-04-03 PROCEDURE — 250N000013 HC RX MED GY IP 250 OP 250 PS 637: Performed by: PEDIATRICS

## 2024-04-03 PROCEDURE — 258N000003 HC RX IP 258 OP 636: Performed by: PEDIATRICS

## 2024-04-03 PROCEDURE — 96374 THER/PROPH/DIAG INJ IV PUSH: CPT

## 2024-04-03 PROCEDURE — 96361 HYDRATE IV INFUSION ADD-ON: CPT

## 2024-04-03 RX ORDER — HEPARIN SODIUM (PORCINE) LOCK FLUSH IV SOLN 100 UNIT/ML 100 UNIT/ML
5 SOLUTION INTRAVENOUS
Status: DISCONTINUED | OUTPATIENT
Start: 2024-04-03 | End: 2024-04-03 | Stop reason: HOSPADM

## 2024-04-03 RX ORDER — ONDANSETRON 4 MG/1
8 TABLET, FILM COATED ORAL
Status: CANCELLED
Start: 2024-07-01

## 2024-04-03 RX ORDER — ONDANSETRON 2 MG/ML
8 INJECTION INTRAMUSCULAR; INTRAVENOUS EVERY 6 HOURS PRN
Status: DISCONTINUED | OUTPATIENT
Start: 2024-04-03 | End: 2024-04-03 | Stop reason: HOSPADM

## 2024-04-03 RX ORDER — HEPARIN SODIUM,PORCINE 10 UNIT/ML
5 VIAL (ML) INTRAVENOUS
Status: CANCELLED | OUTPATIENT
Start: 2024-07-01

## 2024-04-03 RX ORDER — DIPHENHYDRAMINE HCL 25 MG
50 CAPSULE ORAL
Status: CANCELLED
Start: 2024-07-01

## 2024-04-03 RX ORDER — ONDANSETRON 8 MG/1
8 TABLET, ORALLY DISINTEGRATING ORAL
Status: DISCONTINUED | OUTPATIENT
Start: 2024-04-03 | End: 2024-04-03 | Stop reason: HOSPADM

## 2024-04-03 RX ORDER — ONDANSETRON 2 MG/ML
8 INJECTION INTRAMUSCULAR; INTRAVENOUS EVERY 6 HOURS PRN
Status: CANCELLED
Start: 2024-07-01

## 2024-04-03 RX ORDER — OXYCODONE HYDROCHLORIDE 10 MG/1
10 TABLET ORAL EVERY 4 HOURS PRN
Qty: 20 TABLET | Refills: 0 | Status: SHIPPED | OUTPATIENT
Start: 2024-04-03 | End: 2024-04-10

## 2024-04-03 RX ORDER — HEPARIN SODIUM (PORCINE) LOCK FLUSH IV SOLN 100 UNIT/ML 100 UNIT/ML
5 SOLUTION INTRAVENOUS
Status: CANCELLED | OUTPATIENT
Start: 2024-07-01

## 2024-04-03 RX ORDER — KETOROLAC TROMETHAMINE 30 MG/ML
30 INJECTION, SOLUTION INTRAMUSCULAR; INTRAVENOUS ONCE
Status: CANCELLED
Start: 2024-07-01 | End: 2024-07-01

## 2024-04-03 RX ORDER — DIPHENHYDRAMINE HCL 25 MG
50 CAPSULE ORAL
Status: COMPLETED | OUTPATIENT
Start: 2024-04-03 | End: 2024-04-03

## 2024-04-03 RX ORDER — KETOROLAC TROMETHAMINE 30 MG/ML
30 INJECTION, SOLUTION INTRAMUSCULAR; INTRAVENOUS ONCE
Status: COMPLETED | OUTPATIENT
Start: 2024-04-03 | End: 2024-04-03

## 2024-04-03 RX ADMIN — HYDROMORPHONE HYDROCHLORIDE 1 MG: 1 INJECTION, SOLUTION INTRAMUSCULAR; INTRAVENOUS; SUBCUTANEOUS at 11:34

## 2024-04-03 RX ADMIN — SODIUM CHLORIDE, POTASSIUM CHLORIDE, SODIUM LACTATE AND CALCIUM CHLORIDE 1000 ML: 600; 310; 30; 20 INJECTION, SOLUTION INTRAVENOUS at 09:32

## 2024-04-03 RX ADMIN — ONDANSETRON 8 MG: 2 INJECTION INTRAMUSCULAR; INTRAVENOUS at 09:39

## 2024-04-03 RX ADMIN — HYDROMORPHONE HYDROCHLORIDE 1 MG: 1 INJECTION, SOLUTION INTRAMUSCULAR; INTRAVENOUS; SUBCUTANEOUS at 10:42

## 2024-04-03 RX ADMIN — HYDROMORPHONE HYDROCHLORIDE 1 MG: 1 INJECTION, SOLUTION INTRAMUSCULAR; INTRAVENOUS; SUBCUTANEOUS at 09:37

## 2024-04-03 RX ADMIN — DIPHENHYDRAMINE HYDROCHLORIDE 50 MG: 25 CAPSULE ORAL at 09:34

## 2024-04-03 RX ADMIN — KETOROLAC TROMETHAMINE 30 MG: 30 INJECTION, SOLUTION INTRAMUSCULAR; INTRAVENOUS at 09:36

## 2024-04-03 RX ADMIN — Medication 5 ML: at 11:54

## 2024-04-03 NOTE — TELEPHONE ENCOUNTER
Oncology Nurse Triage - Sickle Cell Pain Crisis:    Situation: Jennifer  calling about Sickle Cell Pain Crisis, requesting to be added on for IV fluids and pain medicine    Background:     Patient's last infusion was 4/1/24  Last clinic visit date:3/22/23 Patricia Mantilla   Does patient have active treatment plan?  Yes      Assessment of Symptoms:  Onset/Duration of symptoms: 2 day    Is it typical sickle cell pain? Yes   Location: ribs and arms   Character: Sharp           Intensity: 8/10    Any radiation of pain, numbness, tingling, weakness, warmth, swelling, discoloration of arms or legs?No     Fever?No  (if yes max temperature recorded in last 24 hours):      Chest Pain Present: No     Shortness of breath: No     Other home therapies tried: HEAT/HEATING PAD and WARM BATH     Last home medication taken and when: 6:00am muscle relaxer and ibuprofen 5:00am oxycodone     Any Refills Needed?: Yes separate refill encounter sent     Does patient have transportation & length of time to get to clinic: Yes patient needs transportation help unless early morning appt then only needs help home         Recommendations:     Placed on infusion call list     If you do not hear from the infusion center by 2pm then you will not be able to get in for an infusion today. If symptoms worsen while waiting for call back, and/or you experience fever, chills, SOB, chest pain, cough, n/v, dizziness, numbness, swelling, discoloration of extremities, then seek emergency evaluation in Emergency Department.     Please note, if you are late for your appt, you risk losing your infusion appt as it may delay another patient's infusion who arrived on time.

## 2024-04-03 NOTE — PROGRESS NOTES
Nursing Note  Jennifer Cervantes presents [facility] for:   Chief Complaint   Patient presents with    Infusion     IVF, pain meds       During [facility]'s [facility] appointment, orders from Dr. Duncan were completed.  Frequency: as needed    Progress note:  Patient identification verified by name and date of birth.  Assessment completed.  Vitals recorded in Doc Flowsheets.  Patient was provided with education regarding medication/procedure and possible side effects.  Patient verbalized understanding.     present during visit today: Not Applicable.    Treatment Conditions: Non-applicable.    Premedications: administered per order.    Drug Waste Record: No    Infusion length and rate:  infusion given over approximately  2 hours + 20 minute wait period post final dilaudid dose.    Labs: were not ordered for this appointment.    Vascular access: port accessed today.    Is the next appt scheduled? prn    Post Infusion Assessment:  Patient tolerated infusion without incident.     Discharge Plan:   Follow up plan of care with: ordering provider as scheduled.  Discharge instructions were reviewed with patient.  Patient/representative verbalized understanding of discharge instructions and all questions answered.  [facility] discharged [facility] in stable condition.    Yina Ford RN    Administrations This Visit       diphenhydrAMINE (BENADRYL) capsule 50 mg       Admin Date  04/03/2024 Action  $Given Dose  50 mg Route  Oral Documented By  Yina Ford RN              heparin 100 unit/mL injection 5 mL       Admin Date  04/03/2024 Action  $Given Dose  5 mL Route  Intracatheter Documented By  Yina Ford RN              HYDROmorphone (DILAUDID) injection 1 mg       Admin Date  04/03/2024 Action  $Given Dose  1 mg Route  Intravenous Documented By  Yina Ford RN               Admin Date  04/03/2024  Action  $Given Dose  1 mg Route  Intravenous Documented By  Yina Ford RN               Admin Date  04/03/2024 Action  $Given Dose  1 mg Route  Intravenous Documented By  Yina Ford, JOE              ketorolac (TORADOL) injection 30 mg       Admin Date  04/03/2024 Action  $Given Dose  30 mg Route  Intravenous Documented By  Yina Ford, JOE              lactated ringers BOLUS 1,000 mL       Admin Date  04/03/2024 Action  $New Bag Dose  1,000 mL Rate  500 mL/hr Route  Intravenous Documented By  Yina Ford, JOE              ondansetron (ZOFRAN) injection 8 mg       Admin Date  04/03/2024 Action  $Given Dose  8 mg Route  Intravenous Documented By  Yina Ford, JOE                    /75 (BP Location: Left arm, Patient Position: Semi-Short's, Cuff Size: Adult Regular)   Pulse 88   Temp 97.9  F (36.6  C) (Oral)   Resp 16

## 2024-04-03 NOTE — TELEPHONE ENCOUNTER
Narcotic Refill Request    Medication(s) requested:  oxycodone   Person Requesting Refill:adelaide   What pain is the medication treating: sickle cell pain   How is the medication being taken?:Takes 3 tabs per day 6 max per day   Does pt have enough for today? Yes   Is pain being adequately controlled on the current regimen?: yes   Experiencing any side effects from medication?: None     Date of most recent appointment:  3/22/24 patricia Mantilla   Any No Show Visits:No   Next appointment:   4/8/24 Patricia Mantilla   Last fill date and by whom:  3/28/24 Patricia Mantilla    Reviewed: No Access     Send to provider: Dr Thibodeaux on call and Arely Krueger RNCC

## 2024-04-05 ENCOUNTER — NURSE TRIAGE (OUTPATIENT)
Dept: ONCOLOGY | Facility: CLINIC | Age: 24
End: 2024-04-05
Payer: COMMERCIAL

## 2024-04-05 ENCOUNTER — APPOINTMENT (OUTPATIENT)
Dept: GENERAL RADIOLOGY | Facility: CLINIC | Age: 24
End: 2024-04-05
Attending: STUDENT IN AN ORGANIZED HEALTH CARE EDUCATION/TRAINING PROGRAM
Payer: COMMERCIAL

## 2024-04-05 ENCOUNTER — HOSPITAL ENCOUNTER (EMERGENCY)
Facility: CLINIC | Age: 24
Discharge: HOME | End: 2024-04-05
Attending: STUDENT IN AN ORGANIZED HEALTH CARE EDUCATION/TRAINING PROGRAM | Admitting: STUDENT IN AN ORGANIZED HEALTH CARE EDUCATION/TRAINING PROGRAM
Payer: COMMERCIAL

## 2024-04-05 VITALS
RESPIRATION RATE: 16 BRPM | HEART RATE: 63 BPM | TEMPERATURE: 98.6 F | SYSTOLIC BLOOD PRESSURE: 118 MMHG | BODY MASS INDEX: 25.75 KG/M2 | OXYGEN SATURATION: 92 % | WEIGHT: 150 LBS | DIASTOLIC BLOOD PRESSURE: 76 MMHG

## 2024-04-05 DIAGNOSIS — D57.00 SICKLE CELL ANEMIA WITH CRISIS (H): ICD-10-CM

## 2024-04-05 LAB
ACANTHOCYTES BLD QL SMEAR: ABNORMAL
ALBUMIN SERPL BCG-MCNC: 4.4 G/DL (ref 3.5–5.2)
ALP SERPL-CCNC: 60 U/L (ref 40–150)
ALT SERPL W P-5'-P-CCNC: 27 U/L (ref 0–50)
ANION GAP SERPL CALCULATED.3IONS-SCNC: 9 MMOL/L (ref 7–15)
AST SERPL W P-5'-P-CCNC: 51 U/L (ref 0–45)
ATRIAL RATE - MUSE: 83 BPM
AUER BODIES BLD QL SMEAR: ABNORMAL
BASO STIPL BLD QL SMEAR: ABNORMAL
BASOPHILS # BLD AUTO: 0.3 10E3/UL (ref 0–0.2)
BASOPHILS NFR BLD AUTO: 3 %
BILIRUB SERPL-MCNC: 3.1 MG/DL
BITE CELLS BLD QL SMEAR: ABNORMAL
BLISTER CELLS BLD QL SMEAR: ABNORMAL
BUN SERPL-MCNC: 7.9 MG/DL (ref 6–20)
BURR CELLS BLD QL SMEAR: ABNORMAL
CALCIUM SERPL-MCNC: 8.7 MG/DL (ref 8.6–10)
CHLORIDE SERPL-SCNC: 106 MMOL/L (ref 98–107)
CREAT SERPL-MCNC: 0.5 MG/DL (ref 0.51–0.95)
DACRYOCYTES BLD QL SMEAR: ABNORMAL
DEPRECATED HCO3 PLAS-SCNC: 23 MMOL/L (ref 22–29)
DIASTOLIC BLOOD PRESSURE - MUSE: NORMAL MMHG
EGFRCR SERPLBLD CKD-EPI 2021: >90 ML/MIN/1.73M2
ELLIPTOCYTES BLD QL SMEAR: ABNORMAL
EOSINOPHIL # BLD AUTO: 0.5 10E3/UL (ref 0–0.7)
EOSINOPHIL NFR BLD AUTO: 4 %
ERYTHROCYTE [DISTWIDTH] IN BLOOD BY AUTOMATED COUNT: 25.7 % (ref 10–15)
FLUAV RNA SPEC QL NAA+PROBE: NEGATIVE
FLUBV RNA RESP QL NAA+PROBE: NEGATIVE
FRAGMENTS BLD QL SMEAR: ABNORMAL
GLUCOSE SERPL-MCNC: 88 MG/DL (ref 70–99)
HCG SERPL QL: NEGATIVE
HCT VFR BLD AUTO: 20.6 % (ref 35–47)
HGB BLD-MCNC: 7.2 G/DL (ref 11.7–15.7)
HGB C CRYSTALS: ABNORMAL
HOWELL-JOLLY BOD BLD QL SMEAR: ABNORMAL
IMM GRANULOCYTES # BLD: 0.1 10E3/UL
IMM GRANULOCYTES NFR BLD: 1 %
INTERPRETATION ECG - MUSE: NORMAL
LIPASE SERPL-CCNC: 27 U/L (ref 13–60)
LYMPHOCYTES # BLD AUTO: 2.3 10E3/UL (ref 0.8–5.3)
LYMPHOCYTES NFR BLD AUTO: 20 %
MCH RBC QN AUTO: 31 PG (ref 26.5–33)
MCHC RBC AUTO-ENTMCNC: 35 G/DL (ref 31.5–36.5)
MCV RBC AUTO: 89 FL (ref 78–100)
MONOCYTES # BLD AUTO: 1 10E3/UL (ref 0–1.3)
MONOCYTES NFR BLD AUTO: 9 %
NEUTROPHILS # BLD AUTO: 7.6 10E3/UL (ref 1.6–8.3)
NEUTROPHILS NFR BLD AUTO: 63 %
NEUTS HYPERSEG BLD QL SMEAR: ABNORMAL
NRBC # BLD AUTO: 0.6 10E3/UL
NRBC BLD AUTO-RTO: 5 /100
P AXIS - MUSE: 67 DEGREES
PLAT MORPH BLD: ABNORMAL
PLATELET # BLD AUTO: 500 10E3/UL (ref 150–450)
POLYCHROMASIA BLD QL SMEAR: SLIGHT
POTASSIUM SERPL-SCNC: 3.8 MMOL/L (ref 3.4–5.3)
PR INTERVAL - MUSE: 164 MS
PROT SERPL-MCNC: 7.1 G/DL (ref 6.4–8.3)
QRS DURATION - MUSE: 78 MS
QT - MUSE: 412 MS
QTC - MUSE: 484 MS
R AXIS - MUSE: 31 DEGREES
RBC # BLD AUTO: 2.32 10E6/UL (ref 3.8–5.2)
RBC AGGLUT BLD QL: ABNORMAL
RBC MORPH BLD: ABNORMAL
RETICS # AUTO: 0.61 10E6/UL (ref 0.03–0.1)
RETICS/RBC NFR AUTO: 25.5 % (ref 0.5–2)
ROULEAUX BLD QL SMEAR: ABNORMAL
RSV RNA SPEC NAA+PROBE: NEGATIVE
SARS-COV-2 RNA RESP QL NAA+PROBE: NEGATIVE
SICKLE CELLS BLD QL SMEAR: ABNORMAL
SMUDGE CELLS BLD QL SMEAR: ABNORMAL
SODIUM SERPL-SCNC: 138 MMOL/L (ref 135–145)
SPHEROCYTES BLD QL SMEAR: ABNORMAL
STOMATOCYTES BLD QL SMEAR: ABNORMAL
SYSTOLIC BLOOD PRESSURE - MUSE: NORMAL MMHG
T AXIS - MUSE: 21 DEGREES
TARGETS BLD QL SMEAR: SLIGHT
TOXIC GRANULES BLD QL SMEAR: ABNORMAL
TROPONIN T SERPL HS-MCNC: <6 NG/L
VARIANT LYMPHS BLD QL SMEAR: ABNORMAL
VENTRICULAR RATE- MUSE: 83 BPM
WBC # BLD AUTO: 11.8 10E3/UL (ref 4–11)

## 2024-04-05 PROCEDURE — 83690 ASSAY OF LIPASE: CPT | Performed by: STUDENT IN AN ORGANIZED HEALTH CARE EDUCATION/TRAINING PROGRAM

## 2024-04-05 PROCEDURE — 71046 X-RAY EXAM CHEST 2 VIEWS: CPT

## 2024-04-05 PROCEDURE — 99285 EMERGENCY DEPT VISIT HI MDM: CPT | Mod: 25 | Performed by: STUDENT IN AN ORGANIZED HEALTH CARE EDUCATION/TRAINING PROGRAM

## 2024-04-05 PROCEDURE — 85045 AUTOMATED RETICULOCYTE COUNT: CPT | Performed by: STUDENT IN AN ORGANIZED HEALTH CARE EDUCATION/TRAINING PROGRAM

## 2024-04-05 PROCEDURE — 36415 COLL VENOUS BLD VENIPUNCTURE: CPT | Performed by: STUDENT IN AN ORGANIZED HEALTH CARE EDUCATION/TRAINING PROGRAM

## 2024-04-05 PROCEDURE — 93005 ELECTROCARDIOGRAM TRACING: CPT | Performed by: STUDENT IN AN ORGANIZED HEALTH CARE EDUCATION/TRAINING PROGRAM

## 2024-04-05 PROCEDURE — 96376 TX/PRO/DX INJ SAME DRUG ADON: CPT | Performed by: STUDENT IN AN ORGANIZED HEALTH CARE EDUCATION/TRAINING PROGRAM

## 2024-04-05 PROCEDURE — 96374 THER/PROPH/DIAG INJ IV PUSH: CPT | Performed by: STUDENT IN AN ORGANIZED HEALTH CARE EDUCATION/TRAINING PROGRAM

## 2024-04-05 PROCEDURE — 80053 COMPREHEN METABOLIC PANEL: CPT | Performed by: STUDENT IN AN ORGANIZED HEALTH CARE EDUCATION/TRAINING PROGRAM

## 2024-04-05 PROCEDURE — 93010 ELECTROCARDIOGRAM REPORT: CPT | Performed by: STUDENT IN AN ORGANIZED HEALTH CARE EDUCATION/TRAINING PROGRAM

## 2024-04-05 PROCEDURE — 250N000009 HC RX 250: Performed by: STUDENT IN AN ORGANIZED HEALTH CARE EDUCATION/TRAINING PROGRAM

## 2024-04-05 PROCEDURE — 93005 ELECTROCARDIOGRAM TRACING: CPT | Mod: RTG

## 2024-04-05 PROCEDURE — 87637 SARSCOV2&INF A&B&RSV AMP PRB: CPT | Performed by: STUDENT IN AN ORGANIZED HEALTH CARE EDUCATION/TRAINING PROGRAM

## 2024-04-05 PROCEDURE — 96375 TX/PRO/DX INJ NEW DRUG ADDON: CPT | Performed by: STUDENT IN AN ORGANIZED HEALTH CARE EDUCATION/TRAINING PROGRAM

## 2024-04-05 PROCEDURE — 250N000011 HC RX IP 250 OP 636: Performed by: STUDENT IN AN ORGANIZED HEALTH CARE EDUCATION/TRAINING PROGRAM

## 2024-04-05 PROCEDURE — 84703 CHORIONIC GONADOTROPIN ASSAY: CPT | Performed by: STUDENT IN AN ORGANIZED HEALTH CARE EDUCATION/TRAINING PROGRAM

## 2024-04-05 PROCEDURE — 85025 COMPLETE CBC W/AUTO DIFF WBC: CPT | Performed by: STUDENT IN AN ORGANIZED HEALTH CARE EDUCATION/TRAINING PROGRAM

## 2024-04-05 PROCEDURE — 84484 ASSAY OF TROPONIN QUANT: CPT | Performed by: STUDENT IN AN ORGANIZED HEALTH CARE EDUCATION/TRAINING PROGRAM

## 2024-04-05 RX ORDER — KETOROLAC TROMETHAMINE 30 MG/ML
30 INJECTION, SOLUTION INTRAMUSCULAR; INTRAVENOUS ONCE
Status: COMPLETED | OUTPATIENT
Start: 2024-04-05 | End: 2024-04-05

## 2024-04-05 RX ORDER — ALBUTEROL SULFATE 0.83 MG/ML
2.5 SOLUTION RESPIRATORY (INHALATION) ONCE
Status: COMPLETED | OUTPATIENT
Start: 2024-04-05 | End: 2024-04-05

## 2024-04-05 RX ORDER — ONDANSETRON 2 MG/ML
8 INJECTION INTRAMUSCULAR; INTRAVENOUS ONCE
Status: COMPLETED | OUTPATIENT
Start: 2024-04-05 | End: 2024-04-05

## 2024-04-05 RX ADMIN — ALBUTEROL SULFATE 2.5 MG: 2.5 SOLUTION RESPIRATORY (INHALATION) at 13:58

## 2024-04-05 RX ADMIN — HYDROMORPHONE HYDROCHLORIDE 1 MG: 1 INJECTION, SOLUTION INTRAMUSCULAR; INTRAVENOUS; SUBCUTANEOUS at 10:50

## 2024-04-05 RX ADMIN — HYDROMORPHONE HYDROCHLORIDE 1 MG: 1 INJECTION, SOLUTION INTRAMUSCULAR; INTRAVENOUS; SUBCUTANEOUS at 12:38

## 2024-04-05 RX ADMIN — HYDROMORPHONE HYDROCHLORIDE 1 MG: 1 INJECTION, SOLUTION INTRAMUSCULAR; INTRAVENOUS; SUBCUTANEOUS at 14:05

## 2024-04-05 RX ADMIN — KETOROLAC TROMETHAMINE 30 MG: 30 INJECTION, SOLUTION INTRAMUSCULAR at 10:50

## 2024-04-05 RX ADMIN — ONDANSETRON 8 MG: 2 INJECTION INTRAMUSCULAR; INTRAVENOUS at 10:49

## 2024-04-05 ASSESSMENT — ACTIVITIES OF DAILY LIVING (ADL)
ADLS_ACUITY_SCORE: 37

## 2024-04-05 NOTE — ED TRIAGE NOTES
Triage Assessment (Adult)       Row Name 04/05/24 1016          Triage Assessment    Airway WDL WDL        Respiratory WDL    Respiratory WDL WDL        Skin Circulation/Temperature WDL    Skin Circulation/Temperature WDL WDL        Cardiac WDL    Cardiac WDL WDL        Peripheral/Neurovascular WDL    Peripheral Neurovascular WDL WDL

## 2024-04-05 NOTE — DISCHARGE INSTRUCTIONS
You are seen in the department due to pain crisis related to your sickle cell.  You had lab work here, blood work, and x-ray which all here are reassuring with no signs of anything that needs you stay in the hospital.  We are able to get your pain under control    Without you having some feeling as though you are having wheezing, right now it does not seem as though you are having an asthma exacerbation.  It is okay to continue to take the albuterol every 4 hours if this is making you feel better.  We would recommend that you speak with your provider at your appointment on Monday discuss this further if he continues to return to Emergency Department if you have worsening difficulty breathing, chest pain, or pain

## 2024-04-05 NOTE — ED PROVIDER NOTES
ED Provider Note  [facility]      History     Chief Complaint   Patient presents with    Sickle Cell Pain Crisis     HPI  Jennifer Cervantes is a 24 year old female with a past medical history of sickle cell anemia, previous resultant acute chest and CVA with some residual deficits presents emergency department due to sickle cell pain.  Endorses that started yesterday, his whole body pain, and feels similar to previous sickle cell pain crises she has had in the past.  Has also had some nonproductive cough, wheeze for which she used albuterol inhaler last night with some significant relief.  Now endorsing no significant difficulty breathing, denies any chest pain, fevers or chills, abdominal pain or nausea or vomiting.    Past Medical History  Past Medical History:   Diagnosis Date    Anxiety     Bleeding disorder (H24)     Blood clotting disorder (H24)     Cerebral infarction (H) 2015    Cognitive developmental delay     low IQ. 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 side (H)     right hand contractures    Iron overload due to repeated red blood cell transfusions     Migraines     Multiple subsegmental pulmonary emboli without acute cor pulmonale (H) 02/01/2021    Oppositional defiant behavior     Presence of intrauterine contraceptive device 2/18/2020    Superficial venous thrombosis of arm, right 03/25/2021    Uncomplicated asthma      Past Surgical History:   Procedure Laterality Date    AS INSERT TUNNELED CV 2 CATH W/O PORT/PUMP      CHOLECYSTECTOMY      CV RIGHT HEART CATH MEASUREMENTS RECORDED N/A 11/18/2021    Procedure: Right Heart Cath;  Surgeon: Jackson Stauffer MD;  Location:  HEART CARDIAC CATH LAB    INSERT PORT VASCULAR ACCESS Left 4/21/2021    Procedure: INSERTION, VASCULAR ACCESS PORT (NOT SURE ON SIDE UNTIL REMOVAL);  Surgeon: Rajan More MD;   Location: [facility] OR    IR CHEST PORT PLACEMENT > 5 YRS OF AGE  4/21/2021    IR CVC NON TUNNEL LINE REMOVAL  5/6/2021    IR CVC NON TUNNEL PLACEMENT > 5 YRS  04/07/2020    IR CVC NON TUNNEL PLACEMENT > 5 YRS  4/30/2021    IR CVC NON TUNNEL PLACEMENT > 5 YRS  9/7/2022    IR PORT REMOVAL LEFT  4/21/2021    REMOVE PORT VASCULAR ACCESS Left 4/21/2021    Procedure: REMOVAL, VASCULAR ACCESS PORT LEFT;  Surgeon: Rajan More MD;  Location: [facility] OR    REPAIR TENDON ELBOW Right 10/02/2019    Procedure: Right Elbow Flexor Lengthening, Flexor Pronator Slide Of Wrist and Finger, Thumb Adductor Lengthening;  Surgeon: Anai Franco MD;  Location: [facility] OR    TONSILLECTOMY Bilateral 10/02/2019    Procedure: Bilateral Tonsillectomy;  Surgeon: Farhana Guy MD;  Location: [facility] OR    ZZC BREAST REDUCTION (INCLUDES LIPO) TIER 3 Bilateral 04/23/2019     acetaminophen (TYLENOL) 325 MG tablet  albuterol (PROAIR HFA/PROVENTIL HFA/VENTOLIN HFA) 108 (90 Base) MCG/ACT inhaler  albuterol (PROVENTIL) (2.5 MG/3ML) 0.083% neb solution  aspirin (ASA) 81 MG chewable tablet  budesonide-formoterol (SYMBICORT) 160-4.5 MCG/ACT Inhaler  budesonide-formoterol (SYMBICORT) 160-4.5 MCG/ACT Inhaler  cetirizine (ZYRTEC) 10 MG tablet  deferasirox (JADENU) 360 MG tablet  diphenhydrAMINE (BENADRYL) 25 MG capsule  EPINEPHrine (ANY BX GENERIC EQUIV) 0.3 MG/0.3ML injection 2-pack  hydroxyurea (HYDREA) 500 MG capsule  Hydroxyurea 1000 MG TABS  ibuprofen (ADVIL/MOTRIN) 600 MG tablet  melatonin 5 MG tablet  methocarbamol (ROBAXIN) 750 MG tablet  naloxone (NARCAN) 4 MG/0.1ML nasal spray  naloxone (NARCAN) 4 MG/0.1ML nasal spray  omeprazole (PRILOSEC) 20 MG DR capsule  ondansetron (ZOFRAN) 8 MG tablet  oxyCODONE IR (ROXICODONE) 10 MG tablet      Allergies   Allergen Reactions    Contrast Dye      Hives and breathing issues    Fish-Derived Products Hives    Seafood Hives    Adhesive Tape Hives     Primipore dressing causes hives    Gadolinium      Iodinated Contrast Media      Family History  Family History   Problem Relation Age of Onset    Sickle Cell Trait Mother     Hypertension Mother     Asthma Mother     Sickle Cell Trait Father     Glaucoma No family hx of     Macular Degeneration No family hx of     Diabetes No family hx of     Gout No family hx of      Social History   Social History     Tobacco Use    Smoking status: Never     Passive exposure: Never    Smokeless tobacco: Never   Substance Use Topics    Alcohol use: Not Currently     Alcohol/week: 0.0 standard drinks of alcohol    Drug use: Never         A medically appropriate review of systems was performed with pertinent positives and negatives noted in the HPI, and all other systems negative.    Physical Exam   BP: 114/74  Pulse: 89  Temp: 98.5  F (36.9  C)  Resp: 16  Weight: 68 kg (150 lb)  SpO2: 95 %  Physical Exam  GEN: Well appearing, non toxic, cooperative  HEENT: normocephalic and atraumatic, PERRLA, EOMI  CV: well-perfused, normal skin color for ethnicity, RRR  PULM: breathing comfortably, in no respiratory distress, CABL, no prolonged expiratory phase  ABD: nondistended, soft, nontender, negative liz's, negative mcburney point tenderness  EXT: Full range of motion.  No edema.  NEURO: awake, conversant, grossly normal bilateral upper and lower extremity strength & ROM   SKIN: No rashes, ecchymosis, or lacerations  PSYCH: Calm and cooperative, interactive      ED Course, Procedures, & Data      Procedures            EKG Interpretation:      Interpreted by Lisa Coombs MD  Time reviewed: 1040  Symptoms at time of EKG: body pain   Rhythm: normal sinus   Rate: normal  Axis: normal  Ectopy: none  Conduction: normal  ST Segments/ T Waves: No ST-T wave changes  Q Waves: none  Comparison to prior: Unchanged    Clinical Impression: baseline EKG for pt          Results for orders placed or performed during the hospital encounter of 04/05/24   XR Chest 2 Views     Status: None    Narrative     CHEST TWO VIEWS 4/5/2024 11:14 AM     HISTORY: dyspnea, cp, SSC    COMPARISON: 3/17/2024       Impression    IMPRESSION: No acute cardiopulmonary abnormality. Left chest wall port  catheter is seen with tip overlying the cavoatrial junction.    LILIAN FIELDS MD         SYSTEM ID:  [id number]   Comprehensive metabolic panel     Status: Abnormal   Result Value Ref Range    Sodium 138 135 - 145 mmol/L    Potassium 3.8 3.4 - 5.3 mmol/L    Carbon Dioxide (CO2) 23 22 - 29 mmol/L    Anion Gap 9 7 - 15 mmol/L    Urea Nitrogen 7.9 6.0 - 20.0 mg/dL    Creatinine 0.50 (L) 0.51 - 0.95 mg/dL    GFR Estimate >90 >60 mL/min/1.73m2    Calcium 8.7 8.6 - 10.0 mg/dL    Chloride 106 98 - 107 mmol/L    Glucose 88 70 - 99 mg/dL    Alkaline Phosphatase 60 40 - 150 U/L    AST 51 (H) 0 - 45 U/L    ALT 27 0 - 50 U/L    Protein Total 7.1 6.4 - 8.3 g/dL    Albumin 4.4 3.5 - 5.2 g/dL    Bilirubin Total 3.1 (H) <=1.2 mg/dL   Lipase     Status: Normal   Result Value Ref Range    Lipase 27 13 - 60 U/L   Troponin T, High Sensitivity     Status: Normal   Result Value Ref Range    Troponin T, High Sensitivity <6 <=14 ng/L   Reticulocyte count     Status: Abnormal   Result Value Ref Range    % Reticulocyte 25.5 (H) 0.5 - 2.0 %    Absolute Reticulocyte 0.610 (H) 0.025 - 0.095 10e6/uL   Symptomatic Influenza A/B, RSV, & SARS-CoV2 PCR (COVID-19) Nasopharyngeal     Status: Normal    Specimen: Nasopharyngeal; Swab   Result Value Ref Range    Influenza A PCR Negative Negative    Influenza B PCR Negative Negative    RSV PCR Negative Negative    SARS CoV2 PCR Negative Negative    Narrative    Testing was performed using the Xpert Xpress CoV2/Flu/RSV Assay on the [employer] Instrument. This test should be ordered for the detection of SARS-CoV-2, influenza, and RSV viruses in individuals who meet clinical and/or epidemiological criteria. Test performance is unknown in asymptomatic patients. This test is for in vitro diagnostic use under the FDA EUA for  laboratories certified under CLIA to perform high or moderate complexity testing. This test has not been FDA cleared or approved. A negative result does not rule out the presence of PCR inhibitors in the specimen or target RNA in concentration below the limit of detection for the assay. If only one viral target is positive but coinfection with multiple targets is suspected, the sample should be re-tested with another FDA cleared, approved, or authorized test, if coinfection would change clinical management. This test was validated by the [facility]. These laboratories are certified under the Clinical Laboratory Improvement Amendments of 1988 (CLIA-88) as qualified to perform high complexity laboratory testing.   HCG qualitative Blood     Status: Normal   Result Value Ref Range    hCG Serum Qualitative Negative Negative   CBC with platelets and differential     Status: Abnormal   Result Value Ref Range    WBC Count 11.8 (H) 4.0 - 11.0 10e3/uL    RBC Count 2.32 (L) 3.80 - 5.20 10e6/uL    Hemoglobin 7.2 (L) 11.7 - 15.7 g/dL    Hematocrit 20.6 (L) 35.0 - 47.0 %    MCV 89 78 - 100 fL    MCH 31.0 26.5 - 33.0 pg    MCHC 35.0 31.5 - 36.5 g/dL    RDW 25.7 (H) 10.0 - 15.0 %    Platelet Count 500 (H) 150 - 450 10e3/uL    % Neutrophils 63 %    % Lymphocytes 20 %    % Monocytes 9 %    % Eosinophils 4 %    % Basophils 3 %    % Immature Granulocytes 1 %    NRBCs per 100 WBC 5 (H) <1 /100    Absolute Neutrophils 7.6 1.6 - 8.3 10e3/uL    Absolute Lymphocytes 2.3 0.8 - 5.3 10e3/uL    Absolute Monocytes 1.0 0.0 - 1.3 10e3/uL    Absolute Eosinophils 0.5 0.0 - 0.7 10e3/uL    Absolute Basophils 0.3 (H) 0.0 - 0.2 10e3/uL    Absolute Immature Granulocytes 0.1 <=0.4 10e3/uL    Absolute NRBCs 0.6 10e3/uL   RBC and Platelet Morphology     Status: Abnormal   Result Value Ref Range    Platelet Assessment  Automated Count Confirmed. Platelet morphology is normal.     Automated Count Confirmed. Platelet morphology is normal.     Acanthocytes      Larry Rods      Basophilic Stippling      Bite Cells      Blister Cells      Juvencio Cells      Elliptocytes      Hgb C Crystals      White-Jolly Bodies      Hypersegmented Neutrophils      Polychromasia Slight (A) None Seen    RBC agglutination      RBC Fragments      Reactive Lymphocytes      Rouleaux      Sickle Cells Moderate (A) None Seen    Smudge Cells      Spherocytes      Stomatocytes      Target Cells Slight (A) None Seen    Teardrop Cells      Toxic Neutrophils      RBC Morphology Confirmed RBC Indices    EKG 12-lead, tracing only     Status: None   Result Value Ref Range    Systolic Blood Pressure  mmHg    Diastolic Blood Pressure  mmHg    Ventricular Rate 83 BPM    Atrial Rate 83 BPM    IL Interval 164 ms    QRS Duration 78 ms     ms    QTc 484 ms    P Axis 67 degrees    R AXIS 31 degrees    T Axis 21 degrees    Interpretation ECG       Sinus rhythm  Prolonged QT  Abnormal ECG  Unconfirmed report - interpretation of this ECG is computer generated - see medical record for final interpretation  Confirmed by - EMERGENCY ROOM, PHYSICIAN ([id number]),  ROSANNE RAY ([id number]) on 4/5/2024 10:47:40 AM     CBC with platelets differential     Status: Abnormal    Narrative    The following orders were created for panel order CBC with platelets differential.  Procedure                               Abnormality         Status                     ---------                               -----------         ------                     CBC with platelets and d...[[id number]]  Abnormal            Final result               RBC and Platelet Morphology[[id number]]  Abnormal            Final result                 Please view results for these tests on the individual orders.     Medications   HYDROmorphone (DILAUDID) injection 1 mg (1 mg Intravenous $Given 4/5/24 0523)   ketorolac (TORADOL) injection 30 mg (30 mg Intravenous $Given 4/5/24 1050)   ondansetron (ZOFRAN) injection 8 mg (8 mg Intravenous $Given  4/5/24 1043)   albuterol (PROVENTIL) neb solution 2.5 mg (2.5 mg Nebulization $Given 4/5/24 3226)     Labs Ordered and Resulted from Time of ED Arrival to Time of ED Departure   COMPREHENSIVE METABOLIC PANEL - Abnormal       Result Value    Sodium 138      Potassium 3.8      Carbon Dioxide (CO2) 23      Anion Gap 9      Urea Nitrogen 7.9      Creatinine 0.50 (*)     GFR Estimate >90      Calcium 8.7      Chloride 106      Glucose 88      Alkaline Phosphatase 60      AST 51 (*)     ALT 27      Protein Total 7.1      Albumin 4.4      Bilirubin Total 3.1 (*)    RETICULOCYTE COUNT - Abnormal    % Reticulocyte 25.5 (*)     Absolute Reticulocyte 0.610 (*)    CBC WITH PLATELETS AND DIFFERENTIAL - Abnormal    WBC Count 11.8 (*)     RBC Count 2.32 (*)     Hemoglobin 7.2 (*)     Hematocrit 20.6 (*)     MCV 89      MCH 31.0      MCHC 35.0      RDW 25.7 (*)     Platelet Count 500 (*)     % Neutrophils 63      % Lymphocytes 20      % Monocytes 9      % Eosinophils 4      % Basophils 3      % Immature Granulocytes 1      NRBCs per 100 WBC 5 (*)     Absolute Neutrophils 7.6      Absolute Lymphocytes 2.3      Absolute Monocytes 1.0      Absolute Eosinophils 0.5      Absolute Basophils 0.3 (*)     Absolute Immature Granulocytes 0.1      Absolute NRBCs 0.6     RBC AND PLATELET MORPHOLOGY - Abnormal    Platelet Assessment        Value: Automated Count Confirmed. Platelet morphology is normal.    Acanthocytes        Larry Rods        Basophilic Stippling        Bite Cells        Blister Cells        [place] Cells        Elliptocytes        Hgb C Crystals        White-Jolly Bodies        Hypersegmented Neutrophils        Polychromasia Slight (*)     RBC agglutination        RBC Fragments        Reactive Lymphocytes        Rouleaux        Sickle Cells Moderate (*)     Smudge Cells        Spherocytes        Stomatocytes        Target Cells Slight (*)     Teardrop Cells        Toxic Neutrophils        RBC Morphology Confirmed RBC Indices      LIPASE - Normal    Lipase 27     TROPONIN T, HIGH SENSITIVITY - Normal    Troponin T, High Sensitivity <6     INFLUENZA A/B, RSV, & SARS-COV2 PCR - Normal    Influenza A PCR Negative      Influenza B PCR Negative      RSV PCR Negative      SARS CoV2 PCR Negative     HCG QUALITATIVE PREGNANCY - Normal    hCG Serum Qualitative Negative       XR Chest 2 Views   Final Result   IMPRESSION: No acute cardiopulmonary abnormality. Left chest wall port   catheter is seen with tip overlying the cavoatrial junction.      LILIAN FIELDS MD            SYSTEM ID:  [id number]             Critical care was not performed.     Medical Decision Making  The patient's presentation was of high complexity (a chronic illness severe exacerbation, progression, or side effect of treatment).    The patient's evaluation involved:  review of external note(s) from 3+ sources (see separate area of note for details)  review of 3+ test result(s) ordered prior to this encounter (see separate area of note for details)  ordering and/or review of 3+ test(s) in this encounter (see separate area of note for details)    The patient's management necessitated high risk (drug therapy requiring intensive monitoring (see separate area of note for details)) and high risk (a decision regarding hospitalization).    Assessment & Plan    24-year-old female with a past medical history of sickle cell anemia, asthma presenting to emergency department due to whole body pain that she endorses is similar to her sickle cell crises in the past, as well as a nonproductive cough and wheeze last night that was relieved with albuterol noted to be hemodynamically stable, with no focal lung findings, and neurologically at her baseline here.    Differential includes sickle cell pain crisis, asthma exacerbation, pneumonia, bronchitis, viral syndrome, acute chest    Will plan to follow her care plan as follows for pain control, and evaluate for underlying possible causes:  ER    Dilaudid 1 mg IV q1h up to 3 doses  Toradol 30 mg IV x1   Maintenance IV fluids with LR  Other: Zofran 8 mg IV PRN nausea    3:05 PM patient reevaluated a number of times.  Overall feeling better after each pain medication dose.  She did ask for a nebulizer due to feeling as though she was wheezing, however no evidence of wheeze on evaluation.  Patient did feel subjectively better afterwards.  Right now, not enough convincing findings to send her home with some prednisone, but she does have a follow-up appointment on Monday to talk to them about the fact that she is been having more wheeze over the last couple of weeks in general.  Currently do not see any need for admission.  She is feeling better and amenable for plan for discharge.      I have reviewed the nursing notes. I have reviewed the findings, diagnosis, plan and need for follow up with the patient.    New Prescriptions    No medications on file       Final diagnoses:   Sickle cell anemia with crisis (H)       Lisa Coombs MD  [facility] EMERGENCY DEPARTMENT  4/5/2024     Lisa Coombs MD  04/05/24 [id number]

## 2024-04-05 NOTE — ED TRIAGE NOTES
Onset of sickle cell pain generalized states she cannot pin point which part hurts more than the other.  Onset of dyspnea last night and states she was wheezing and slight cough, did a nebulizer 4 hours ago and states the dyspnea has resolved.. 9/10.

## 2024-04-05 NOTE — TELEPHONE ENCOUNTER
Oncology Nurse Triage - Sickle Cell Pain Crisis:  Situation: Jennifer  calling about Sickle Cell Pain Crisis, requesting to be added on for IV fluids and pain medicine. Asthma acting up. Breathe fine, just took a treatment, when she breathes, can feel the wheezing in her chest.    Background:   Patient's last infusion was 4/3/24  Last clinic visit date:3/22/24 with Patricia Mantilla CNP  Does patient have active treatment plan?  Yes She is limited to two infusion apts/week. Has had two infusions this week.    Assessment of Symptoms:  Onset/Duration of symptoms: 1 day-started during the night.    Is it typical sickle cell pain? Yes   Location: lower back, can't sleep tonight  Character: sharp/shooting and it's not worse than usual, haven't been asleep yet           Intensity: 9.5/10    Any radiation of pain, numbness, tingling, weakness, warmth, swelling, discoloration of arms or legs?No     Fever?No  Chest Pain Present: No   Shortness of breath: No Checking HR and O2 sat on my phone: HR: 88-90, O2 sats--80's  O2 sats usually run in %    Other home therapies tried: Warm baths and heating pad     Last home medication taken and when: oxycodone an hour ago    Any Refills Needed?: No     No infusion apts available.    Recommendations:   Paging provider at 0839: Dr. Thibodeaux as Jennifer's care team is out of the office    0850: Return call from Dr. Thibodeaux: reviewed pt's sx, therapy plan; there is no infusion availability today and concern is for asthma flare, low sats and wheezing. Advised ED.    0855: spoke to Jennifer and advised ED d/t low sats, wheezing, pain.    Routed to Care Team.

## 2024-04-08 ENCOUNTER — TELEPHONE (OUTPATIENT)
Dept: PULMONOLOGY | Facility: CLINIC | Age: 24
End: 2024-04-08
Payer: COMMERCIAL

## 2024-04-08 ENCOUNTER — ONCOLOGY VISIT (OUTPATIENT)
Dept: ONCOLOGY | Facility: CLINIC | Age: 24
End: 2024-04-08
Attending: REGISTERED NURSE
Payer: COMMERCIAL

## 2024-04-08 ENCOUNTER — INFUSION THERAPY VISIT (OUTPATIENT)
Dept: TRANSPLANT | Facility: CLINIC | Age: 24
End: 2024-04-08
Attending: PEDIATRICS
Payer: COMMERCIAL

## 2024-04-08 ENCOUNTER — NURSE TRIAGE (OUTPATIENT)
Dept: ONCOLOGY | Facility: CLINIC | Age: 24
End: 2024-04-08
Payer: COMMERCIAL

## 2024-04-08 ENCOUNTER — APPOINTMENT (OUTPATIENT)
Dept: LAB | Facility: CLINIC | Age: 24
End: 2024-04-08
Attending: REGISTERED NURSE
Payer: COMMERCIAL

## 2024-04-08 VITALS
SYSTOLIC BLOOD PRESSURE: 120 MMHG | WEIGHT: 152.1 LBS | HEART RATE: 85 BPM | DIASTOLIC BLOOD PRESSURE: 75 MMHG | OXYGEN SATURATION: 95 % | BODY MASS INDEX: 26.11 KG/M2 | RESPIRATION RATE: 16 BRPM | TEMPERATURE: 98.5 F

## 2024-04-08 DIAGNOSIS — G81.10 SPASTIC HEMIPLEGIA, UNSPECIFIED ETIOLOGY, UNSPECIFIED LATERALITY (H): ICD-10-CM

## 2024-04-08 DIAGNOSIS — R10.13 EPIGASTRIC PAIN: ICD-10-CM

## 2024-04-08 DIAGNOSIS — D57.1 HB-SS DISEASE WITHOUT CRISIS (H): Primary | ICD-10-CM

## 2024-04-08 DIAGNOSIS — D57.00 SICKLE CELL PAIN CRISIS (H): Primary | ICD-10-CM

## 2024-04-08 LAB
ACANTHOCYTES BLD QL SMEAR: SLIGHT
ALBUMIN SERPL BCG-MCNC: 4.6 G/DL (ref 3.5–5.2)
ALP SERPL-CCNC: 65 U/L (ref 40–150)
ALT SERPL W P-5'-P-CCNC: 19 U/L (ref 0–50)
ANION GAP SERPL CALCULATED.3IONS-SCNC: 10 MMOL/L (ref 7–15)
AST SERPL W P-5'-P-CCNC: 45 U/L (ref 0–45)
AUER BODIES BLD QL SMEAR: ABNORMAL
BASO STIPL BLD QL SMEAR: ABNORMAL
BASOPHILS # BLD AUTO: 0.2 10E3/UL (ref 0–0.2)
BASOPHILS NFR BLD AUTO: 2 %
BILIRUB SERPL-MCNC: 3.3 MG/DL
BITE CELLS BLD QL SMEAR: ABNORMAL
BLISTER CELLS BLD QL SMEAR: ABNORMAL
BUN SERPL-MCNC: 6.6 MG/DL (ref 6–20)
BURR CELLS BLD QL SMEAR: ABNORMAL
CALCIUM SERPL-MCNC: 9 MG/DL (ref 8.6–10)
CHLORIDE SERPL-SCNC: 106 MMOL/L (ref 98–107)
CREAT SERPL-MCNC: 0.46 MG/DL (ref 0.51–0.95)
DACRYOCYTES BLD QL SMEAR: ABNORMAL
DEPRECATED HCO3 PLAS-SCNC: 22 MMOL/L (ref 22–29)
EGFRCR SERPLBLD CKD-EPI 2021: >90 ML/MIN/1.73M2
ELLIPTOCYTES BLD QL SMEAR: ABNORMAL
EOSINOPHIL # BLD AUTO: 0.5 10E3/UL (ref 0–0.7)
EOSINOPHIL NFR BLD AUTO: 4 %
ERYTHROCYTE [DISTWIDTH] IN BLOOD BY AUTOMATED COUNT: 24.1 % (ref 10–15)
FRAGMENTS BLD QL SMEAR: ABNORMAL
GLUCOSE SERPL-MCNC: 86 MG/DL (ref 70–99)
HCT VFR BLD AUTO: 22.5 % (ref 35–47)
HGB BLD-MCNC: 8 G/DL (ref 11.7–15.7)
HGB C CRYSTALS: ABNORMAL
HOWELL-JOLLY BOD BLD QL SMEAR: ABNORMAL
IMM GRANULOCYTES # BLD: 0.1 10E3/UL
IMM GRANULOCYTES NFR BLD: 1 %
LYMPHOCYTES # BLD AUTO: 1.6 10E3/UL (ref 0.8–5.3)
LYMPHOCYTES NFR BLD AUTO: 12 %
MCH RBC QN AUTO: 31.3 PG (ref 26.5–33)
MCHC RBC AUTO-ENTMCNC: 35.6 G/DL (ref 31.5–36.5)
MCV RBC AUTO: 88 FL (ref 78–100)
MONOCYTES # BLD AUTO: 1.1 10E3/UL (ref 0–1.3)
MONOCYTES NFR BLD AUTO: 8 %
NEUTROPHILS # BLD AUTO: 10.2 10E3/UL (ref 1.6–8.3)
NEUTROPHILS NFR BLD AUTO: 73 %
NEUTS HYPERSEG BLD QL SMEAR: ABNORMAL
NRBC # BLD AUTO: 0.6 10E3/UL
NRBC BLD AUTO-RTO: 5 /100
PLAT MORPH BLD: ABNORMAL
PLATELET # BLD AUTO: 490 10E3/UL (ref 150–450)
POLYCHROMASIA BLD QL SMEAR: ABNORMAL
POTASSIUM SERPL-SCNC: 3.8 MMOL/L (ref 3.4–5.3)
PROT SERPL-MCNC: 7.9 G/DL (ref 6.4–8.3)
RBC # BLD AUTO: 2.56 10E6/UL (ref 3.8–5.2)
RBC AGGLUT BLD QL: ABNORMAL
RBC MORPH BLD: ABNORMAL
RETICS # AUTO: 0.72 10E6/UL (ref 0.03–0.1)
RETICS/RBC NFR AUTO: 29.4 % (ref 0.5–2)
ROULEAUX BLD QL SMEAR: ABNORMAL
SICKLE CELLS BLD QL SMEAR: ABNORMAL
SMUDGE CELLS BLD QL SMEAR: ABNORMAL
SODIUM SERPL-SCNC: 138 MMOL/L (ref 135–145)
SPHEROCYTES BLD QL SMEAR: ABNORMAL
STOMATOCYTES BLD QL SMEAR: ABNORMAL
TARGETS BLD QL SMEAR: ABNORMAL
TOXIC GRANULES BLD QL SMEAR: ABNORMAL
VARIANT LYMPHS BLD QL SMEAR: ABNORMAL
WBC # BLD AUTO: 13 10E3/UL (ref 4–11)

## 2024-04-08 PROCEDURE — 96376 TX/PRO/DX INJ SAME DRUG ADON: CPT

## 2024-04-08 PROCEDURE — 250N000013 HC RX MED GY IP 250 OP 250 PS 637: Performed by: PEDIATRICS

## 2024-04-08 PROCEDURE — G0463 HOSPITAL OUTPT CLINIC VISIT: HCPCS | Performed by: REGISTERED NURSE

## 2024-04-08 PROCEDURE — G2211 COMPLEX E/M VISIT ADD ON: HCPCS | Performed by: REGISTERED NURSE

## 2024-04-08 PROCEDURE — 99417 PROLNG OP E/M EACH 15 MIN: CPT | Performed by: REGISTERED NURSE

## 2024-04-08 PROCEDURE — 96375 TX/PRO/DX INJ NEW DRUG ADDON: CPT

## 2024-04-08 PROCEDURE — 82565 ASSAY OF CREATININE: CPT | Performed by: REGISTERED NURSE

## 2024-04-08 PROCEDURE — 96374 THER/PROPH/DIAG INJ IV PUSH: CPT

## 2024-04-08 PROCEDURE — 250N000011 HC RX IP 250 OP 636: Performed by: REGISTERED NURSE

## 2024-04-08 PROCEDURE — 85045 AUTOMATED RETICULOCYTE COUNT: CPT | Performed by: REGISTERED NURSE

## 2024-04-08 PROCEDURE — 36591 DRAW BLOOD OFF VENOUS DEVICE: CPT | Performed by: REGISTERED NURSE

## 2024-04-08 PROCEDURE — 85025 COMPLETE CBC W/AUTO DIFF WBC: CPT | Performed by: REGISTERED NURSE

## 2024-04-08 PROCEDURE — 99215 OFFICE O/P EST HI 40 MIN: CPT | Performed by: REGISTERED NURSE

## 2024-04-08 PROCEDURE — 258N000003 HC RX IP 258 OP 636: Performed by: PEDIATRICS

## 2024-04-08 PROCEDURE — 250N000011 HC RX IP 250 OP 636: Performed by: PEDIATRICS

## 2024-04-08 RX ORDER — HEPARIN SODIUM (PORCINE) LOCK FLUSH IV SOLN 100 UNIT/ML 100 UNIT/ML
5 SOLUTION INTRAVENOUS ONCE
Status: COMPLETED | OUTPATIENT
Start: 2024-04-08 | End: 2024-04-08

## 2024-04-08 RX ORDER — DIPHENHYDRAMINE HCL 25 MG
50 CAPSULE ORAL
Status: CANCELLED
Start: 2024-07-01

## 2024-04-08 RX ORDER — KETOROLAC TROMETHAMINE 30 MG/ML
30 INJECTION, SOLUTION INTRAMUSCULAR; INTRAVENOUS ONCE
Status: COMPLETED | OUTPATIENT
Start: 2024-04-08 | End: 2024-04-08

## 2024-04-08 RX ORDER — ONDANSETRON 2 MG/ML
8 INJECTION INTRAMUSCULAR; INTRAVENOUS EVERY 6 HOURS PRN
Status: DISCONTINUED | OUTPATIENT
Start: 2024-04-08 | End: 2024-04-08 | Stop reason: HOSPADM

## 2024-04-08 RX ORDER — HEPARIN SODIUM (PORCINE) LOCK FLUSH IV SOLN 100 UNIT/ML 100 UNIT/ML
5 SOLUTION INTRAVENOUS
Status: CANCELLED | OUTPATIENT
Start: 2024-07-01

## 2024-04-08 RX ORDER — ONDANSETRON 4 MG/1
8 TABLET, FILM COATED ORAL
Status: CANCELLED
Start: 2024-07-01

## 2024-04-08 RX ORDER — HEPARIN SODIUM,PORCINE 10 UNIT/ML
5 VIAL (ML) INTRAVENOUS
Status: CANCELLED | OUTPATIENT
Start: 2024-07-01

## 2024-04-08 RX ORDER — ONDANSETRON 2 MG/ML
8 INJECTION INTRAMUSCULAR; INTRAVENOUS EVERY 6 HOURS PRN
Status: CANCELLED
Start: 2024-07-01

## 2024-04-08 RX ORDER — DIPHENHYDRAMINE HCL 25 MG
50 CAPSULE ORAL
Status: COMPLETED | OUTPATIENT
Start: 2024-04-08 | End: 2024-04-08

## 2024-04-08 RX ORDER — KETOROLAC TROMETHAMINE 30 MG/ML
30 INJECTION, SOLUTION INTRAMUSCULAR; INTRAVENOUS ONCE
Status: CANCELLED
Start: 2024-07-01 | End: 2024-07-01

## 2024-04-08 RX ADMIN — HYDROMORPHONE HYDROCHLORIDE 1 MG: 1 INJECTION, SOLUTION INTRAMUSCULAR; INTRAVENOUS; SUBCUTANEOUS at 14:03

## 2024-04-08 RX ADMIN — SODIUM CHLORIDE, POTASSIUM CHLORIDE, SODIUM LACTATE AND CALCIUM CHLORIDE 1000 ML: 600; 310; 30; 20 INJECTION, SOLUTION INTRAVENOUS at 12:49

## 2024-04-08 RX ADMIN — ONDANSETRON 8 MG: 2 INJECTION INTRAMUSCULAR; INTRAVENOUS at 12:58

## 2024-04-08 RX ADMIN — Medication 5 ML: at 12:33

## 2024-04-08 RX ADMIN — HYDROMORPHONE HYDROCHLORIDE 1 MG: 1 INJECTION, SOLUTION INTRAMUSCULAR; INTRAVENOUS; SUBCUTANEOUS at 14:53

## 2024-04-08 RX ADMIN — HYDROMORPHONE HYDROCHLORIDE 1 MG: 1 INJECTION, SOLUTION INTRAMUSCULAR; INTRAVENOUS; SUBCUTANEOUS at 12:51

## 2024-04-08 RX ADMIN — KETOROLAC TROMETHAMINE 30 MG: 30 INJECTION, SOLUTION INTRAMUSCULAR; INTRAVENOUS at 12:58

## 2024-04-08 RX ADMIN — DIPHENHYDRAMINE HYDROCHLORIDE 50 MG: 25 CAPSULE ORAL at 12:50

## 2024-04-08 ASSESSMENT — PAIN SCALES - GENERAL: PAINLEVEL: EXTREME PAIN (8)

## 2024-04-08 NOTE — Clinical Note
4/8/2024         RE: Jennifer Cervantes  [address]  [facility] [address]        Dear Colleague,    Thank you for referring your patient, Jennifer Cervantes, to the [facility]. Please see a copy of my visit note below.    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 She had multiple thromboembolic events in 2021 despite adherent anticoagulation use (though warfarin was perpetually low) and there are concerns for chronic thromboembolic disease but did not have pulmonary HTN on a November 2021 cath. She is maintained on chronic PO opioids and twice-weekly infusion visits (since 1/24/22) but has been able to be maintained on this regimen and has stayed out of the ED most of the time with even rarer admissions for most of 2023.     Interval History:  ***    Sickle Cell Disease Comprehensive Checklist  Bone Health/Avascular Necrosis Screening/Symptoms (each visit): no new concerns today  Leg Ulcer evaluation (every visit): no  Hypertension (every visit):stable 11/3/23  Last pulmonary evaluation (asthma, AMAN, pulm HTN): 9/28/22, due for follow-up  Stroke/silent cerebral infarct Hx (Y/N): Yes TIA ~2014, first event ~age 1 with full stroke and R sided weakness  Last PCP Visit: 3/6/23  Vaccines:  PCV13: 5/13/19  Pneumovax (PPSV23): 3/04, 10/09, 7/12/19 (next due 7/2024)  Menactra: 4/2010, 9/2015 (MCV done 8/16/21)  MenB: 9/16/15, 5/13/19  Influenza: 10/2/23  Audiology (chelation): done 2/27/24  Comparison to Previous Audiogram dated 6/6/2022:      Pure Tone Thresholds 250-8000 Hz:    RIGHT: stable    LEFT: stable     High Frequency Audiometry 9,000-16,000Hz:     RIGHT: stable    LEFT: stable     Word Recognition Score:    RIGHT: stable    LEFT: stable    Plan last reviewed with patient: 10/2/23    Patient background: 24 yo F, enjoys movies and kids though there are times where she does not really want to talk to people. Does not have a lot of social support at home.     Sickle Cell Disease History  Primary Hematologist Team: Jose Rafael Duncan  PCP: none  Genotype: SS  Acute Pain Crisis Treatment: (limiting IV)   ER   Dilaudid 1 mg IV q1h up to 3 doses  Toradol 30 mg IV x1   Maintenance IV fluids with LR  Other: Zofran 8 mg IV PRN nausea  Inpatient:  PCA Dilaudid 1 hr q30 minutes, no basal rate  Toradol 30 mg x6h x 4 hr  LR maintenance x 1-2 days  Other Medications: Zofran  ASA  Supportive Care: Docusate, Senna  Chronic Pain Medications:    Home regimen: oxycodone 15 mg p.o. q.4-6 hours p.r.n. breakthrough pain.  She also continues on Voltaren gel, and Zoloft among other medications.    -Also benefits from mental health visits, acupuncture  Baseline Hemoglobin: 7 g/dl without chronic transfusions  Hydroxyurea use: Yes  H/O blood transfusions: Yes, several (iron overload) Most recent 11/20/2021  H/O Transfusion Reactions: no  Antibodies:none  H/O Acute Chest Syndrome: Yes  Last episode:9/05/22 (previously 4/26/21, 10/2019)   ICU/intubation: not with 9/2022 admission  H/O Stroke: Yes (managed with chronic transfusions in the past, stopped late Spring 2020)  H/O VTE: Yes (2/2021)  H/O Cholecystectomy or Splenectomy: no  H/O Asthma, Pulm HTN, AVN, Leg Ulcers, Nephropathy, Retinopathy, etc: Iron overload, asthma, chronic lung disease, physical limitations from early stroke    ---------------------------------------  Jennifer Cervantes's Goals (did not review today)    1-3 month goal:  Return to work, hopefully with shorter shifts. Would like to complete [facility] paperwork  to keep her job safe.    6 month goal:      12 month goal:      Disease-specific goal(s):  Decrease frequency of ED and infusion center visits. Ultimately would like to decrease oxycodone use.  ---------------------------------------      Current Outpatient Medications   Medication Sig Dispense Refill    acetaminophen (TYLENOL) 325 MG tablet Take 2 tablets (650 mg) by mouth every 6 hours as needed for mild pain (Patient not taking: Reported on 3/18/2024) 120 tablet 3    albuterol (PROAIR HFA/PROVENTIL HFA/VENTOLIN HFA) 108 (90 Base) MCG/ACT inhaler Inhale 2 puffs into the lungs every 6 hours as needed for shortness of breath or wheezing (Patient not taking: Reported on 3/18/2024) 8.5 g 3    albuterol (PROVENTIL) (2.5 MG/3ML) 0.083% neb solution Take 2 vials (5 mg) by nebulization every 6 hours as needed for shortness of breath or wheezing (Patient not taking: Reported on 3/18/2024) 90 mL 3    aspirin (ASA) 81 MG chewable tablet Take 1 tablet (81 mg) by mouth 2 times daily 60 tablet 11    budesonide-formoterol (SYMBICORT) 160-4.5 MCG/ACT Inhaler Inhale 2 puffs twice daily plus 1-2 puffs as needed. May use up to 12 puffs per day. 20.4 g 11    budesonide-formoterol (SYMBICORT) 160-4.5 MCG/ACT Inhaler Inhale 2 puffs into the lungs 2 times daily 10.2 g 3    cetirizine (ZYRTEC) 10 MG tablet Take 1 tablet (10 mg) by mouth daily 30 tablet 1    deferasirox (JADENU) 360 MG tablet Take 4 tablets (1,440 mg) by mouth every evening 120 tablet 4    diphenhydrAMINE (BENADRYL) 25 MG capsule Take 1 capsule (25 mg) by mouth every 6 hours as needed for itching or allergies 30 capsule 0    EPINEPHrine (ANY BX GENERIC EQUIV) 0.3 MG/0.3ML injection 2-pack Inject 0.3 mLs (0.3 mg) into the muscle as needed for anaphylaxis (Patient not taking: Reported on 3/18/2024) 1 each 1    hydroxyurea (HYDREA) 500 MG capsule       Hydroxyurea 1000 MG TABS Take 3,000 mg by mouth daily 90 tablet 3    ibuprofen (ADVIL/MOTRIN) 600 MG tablet Take 600 mg by  mouth every 6 hours as needed for moderate pain Using rarely, 2x/week at most      melatonin 5 MG tablet Take 1 tablet (5 mg) by mouth nightly as needed for sleep 30 tablet 1    methocarbamol (ROBAXIN) 750 MG tablet Take 1 tablet (750 mg) by mouth 4 times daily as needed for muscle spasms (during sickle pain crises. Okay to take scheduled while in pain) 60 tablet 1    naloxone (NARCAN) 4 MG/0.1ML nasal spray Spray 1 spray (4 mg) into one nostril alternating nostrils as needed for opioid reversal every 2-3 minutes until assistance arrives (Patient not taking: Reported on 3/18/2024) 0.2 mL 0    naloxone (NARCAN) 4 MG/0.1ML nasal spray Spray 4 mg into one nostril alternating nostrils as needed for opioid reversal every 2-3 minutes until assistance arrives (Patient not taking: Reported on 2/5/2024)      omeprazole (PRILOSEC) 20 MG DR capsule Take 1 capsule (20 mg) by mouth daily (Patient not taking: Reported on 3/18/2024) 30 capsule 0    ondansetron (ZOFRAN) 8 MG tablet Take 1 tablet (8 mg) by mouth every 8 hours as needed for nausea 30 tablet 1    oxyCODONE IR (ROXICODONE) 10 MG tablet Take 1 tablet (10 mg) by mouth every 4 hours as needed for severe pain or breakthrough pain Goal 4 per day. Max 6 per day. 20 tablet 0       Past Medical History  Past Medical History:   Diagnosis Date    Anxiety     Bleeding disorder (H24)     Blood clotting disorder (H24)     Cerebral infarction (H) 2015    Cognitive developmental delay     low IQ. 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 side (H)     right hand contractures    Iron overload due to repeated red blood cell transfusions     Migraines     Multiple subsegmental pulmonary emboli without acute cor pulmonale (H) 02/01/2021    Oppositional defiant behavior     Presence of intrauterine contraceptive device 2/18/2020    Superficial venous thrombosis of  arm, right 03/25/2021    Uncomplicated asthma      Past Surgical History:   Procedure Laterality Date    AS INSERT TUNNELED CV 2 CATH W/O PORT/PUMP      CHOLECYSTECTOMY      CV RIGHT HEART CATH MEASUREMENTS RECORDED N/A 11/18/2021    Procedure: Right Heart Cath;  Surgeon: Jackson Stauffer MD;  Location:  HEART CARDIAC CATH LAB    INSERT PORT VASCULAR ACCESS Left 4/21/2021    Procedure: INSERTION, VASCULAR ACCESS PORT (NOT SURE ON SIDE UNTIL REMOVAL);  Surgeon: Rajan More MD;  Location: [facility] OR    IR CHEST PORT PLACEMENT > 5 YRS OF AGE  4/21/2021    IR CVC NON TUNNEL LINE REMOVAL  5/6/2021    IR CVC NON TUNNEL PLACEMENT > 5 YRS  04/07/2020    IR CVC NON TUNNEL PLACEMENT > 5 YRS  4/30/2021    IR CVC NON TUNNEL PLACEMENT > 5 YRS  9/7/2022    IR PORT REMOVAL LEFT  4/21/2021    REMOVE PORT VASCULAR ACCESS Left 4/21/2021    Procedure: REMOVAL, VASCULAR ACCESS PORT LEFT;  Surgeon: Rajan More MD;  Location: [facility] OR    REPAIR TENDON ELBOW Right 10/02/2019    Procedure: Right Elbow Flexor Lengthening, Flexor Pronator Slide Of Wrist and Finger, Thumb Adductor Lengthening;  Surgeon: Anai Franco MD;  Location: [facility] OR    TONSILLECTOMY Bilateral 10/02/2019    Procedure: Bilateral Tonsillectomy;  Surgeon: Farhana Guy MD;  Location: [facility] OR    ZZC BREAST REDUCTION (INCLUDES LIPO) TIER 3 Bilateral 04/23/2019     Allergies   Allergen Reactions    Contrast Dye      Hives and breathing issues    Fish-Derived Products Hives    Seafood Hives    Adhesive Tape Hives     Primipore dressing causes hives    Gadolinium     Iodinated Contrast Media      Social History   Social History     Tobacco Use    Smoking status: Never     Passive exposure: Never    Smokeless tobacco: Never   Substance Use Topics    Alcohol use: Not Currently     Alcohol/week: 0.0 standard drinks of alcohol    Drug use: Never   Her mom lives next door to her.  Past medical history and social history were reviewed.    Physical  Examination:  There were no vitals taken for this visit.      Wt Readings from Last 10 Encounters:   04/05/24 68 kg (150 lb)   03/22/24 67.6 kg (149 lb)   03/18/24 69 kg (152 lb 3.2 oz)   03/17/24 70.3 kg (155 lb)   03/08/24 68 kg (150 lb)   03/01/24 68 kg (150 lb)   02/03/24 67.1 kg (148 lb)   01/26/24 66.2 kg (145 lb 14.4 oz)   01/12/24 66.2 kg (146 lb)   01/06/24 67.6 kg (149 lb)     General: Pleasant female, NAD  Eyes: EOMI, PERRL  Respiratory: Normal effort, no adventitious breath sounds  Neurologic: Grossly nonfocal. A/O x 4.  Skin: No rashes, petechiae, or bruising noted on exposed skin. Port accessed in left chest  Psych: tearful, inconsolable     Laboratory Data:  Most Recent 3 CBC's:  Recent Labs   Lab Test 04/05/24  1037 03/29/24  0351 03/22/24  1045   WBC 11.8* 12.5* 11.3*   HGB 7.2* 7.4* 7.4*   MCV 89 86 89   * 503* 355   ANEUTAUTO 7.6 7.6 8.3    Most Recent 3 BMP's:  Recent Labs   Lab Test 04/05/24  1037 03/29/24  0351 03/22/24  1045 03/17/24  0349 03/13/24  0032    135 136 137 136   POTASSIUM 3.8 3.9 4.0 3.8 3.5   CHLORIDE 106 102 103 104 105   CO2 23 21* 21* 22 22   BUN 7.9 8.3 9.3 6.1 8.0   CR 0.50* 0.53 0.49* 0.48* 0.50*   ANIONGAP 9 12 12 11 9   MICAH 8.7 8.7 9.0 8.5* 8.5*   GLC 88 91 106* 86 90   PROTTOTAL 7.1  --   --  6.9 6.8   ALBUMIN 4.4  --   --  4.3 4.2    Most Recent 2 LFT's:  Recent Labs   Lab Test 04/05/24  1037 03/17/24  0349   AST 51* 71*   ALT 27 49   ALKPHOS 60 66   BILITOTAL 3.1* 4.5*    Most Recent TSH and T4:No lab results found.  Phos/Mag:  Lab Results   Component Value Date    PHOS 4.8 (H) 05/13/2023    PHOS 5.0 (H) 05/12/2023    PHOS 3.6 02/21/2021    MAG 2.0 11/11/2021    MAG 1.7 02/21/2021    MAG 2.1 02/02/2021       Latest Reference Range & Units 12/28/23 11:11 01/26/24 11:05 03/18/24 12:02   Ferritin 6 - 175 ng/mL 5,430 (H) 5,234 (H) 3,847 (H)     I reviewed the above labs today.    Assessment and Plan:  1. Sickle Cell HgbSS Disease  2. Acute pain crises  3.  Chronic Pain  4. 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 History of CVA  7. Hearing loss  8. Nausea/vomiting     Jennifer is seen today for an interim visit to continue to discuss goals of care and concern over her recent hematology visit with Dr. Duncan on 3/18/24. Please see his note for additional details. She voices specific anxiety and frustration over the fact that she has been labeled as an addict in regard to her opioid use, despite efforts for clarification on the meaning of this term during her recent visit. We discussed her desire to seek care elsewhere or with a different provider in clinic. She specifically has asked to see Dr. Sales who is unable to accommodate Jennifer as a new patient at this time which I relayed to her, along with the fact that he did review her case and would support the same recommendations outlined by Dr. Duncan to make active efforts to reduce her opioid use. I also personally expressed that I support these efforts and confirmed that ultimately Jennifer does share this goal. I stressed that we do not need to make an immediate changes to her plan today but that I would like her to come in to her visits with a focused approach in an effort to move forward with her care rather than become stagnant within our goal-setting.     Regarding her ability to return to work, we discussed that prohibiting her to work for an extended time frame sets her up to remain out of employment for longer than necessary due to the fact that we can not determine a definitive date in which she will feel physically capable enough to complete her shifts, particularly in the setting of chronic pain. I agreed to providing a work letter limiting her return for another two weeks but that this would need to be reevaluated in short intervals, even weekly if necessary.     At this time she would like to continue care with our clinic under guidance of her  current hematology team including myself and Dr. Duncan. I will see her in 2 weeks for close follow-up.     -------------------------------------  Plan:  -Work letter provided today to remain out of work for 2 weeks. Need for reassessment prior to receiving additional letters. Would recommend she pursue formal FMLA in the future.  -Upper GI endoscopy for evaluation of persistent n/v  -Continue Hydrea to 3000mg daily to help lessen frequency of sickle cell pain.   -Continue monthly crizanlizumab infusions.  -Continue slow taper of oxycodone. Currently receiving oxycodone 10 mg every 4 hours PRN, qty 20. Plan to tapering to qty 15 when she is agreeable.  -She can self-reduce infusion days/week and we will continue to keep the cap at 2/week for now.  -Continue infusion center visits limited to two times per week (Mondays and Fridays ideally although still needs to call to request). Continue diligent home management with current medications, heat, rest, compression, warm baths. Methocarbamol was recently added which Jennifer is utilizing and finds helpful.  -If unable to manage at home can go to ED  with continued plan to use IV Dilaudid and take a break from ketamine (10/2/23). No PCA use and goal for any admission would still be to discharge by 5 days or less  - Continue Jadenu. Repeat Ferriscan in 4-6 months (January-March 2024). Will consider deferiprone after Ferriscan  -Continue aspirin BID  -RTC with Dr. Duncan in ~1 month, scheduled for 4/29/24    Patricia Mantilla CNP  ---  *** minutes spent on the date of the encounter doing {2021 E&M time in:[id number]}.    The longitudinal plan of care for the diagnosis(es)/condition(s) as documented were addressed during this visit. Due to the added complexity in care, I will continue to support Jennifer in the subsequent management and with ongoing continuity of care.                    Again, thank you for allowing me to participate in the care of your patient.         Sincerely,        Patricia Mantilla, CNP

## 2024-04-08 NOTE — PROGRESS NOTES
Infusion Nursing Note:  Jennifer Cervantes presents today for add on IVF and pain meds.    Patient seen by provider today: Patricia Castellano   present during visit today: Not Applicable.     Note: Labs monitored. Pt notes back pain that is 8/10. Given 1L LR bolus over 2 hours, 8mg IVP Zofran and 50mg PO Benadryl. Given 1mg IVP Dilaudid Q1 HR for max of 3 doses. Patient stated pain improved to 5/10 which is goal upon discharge.     Intravenous Access:  Implanted Port.  +BR noted pre and post infusion  De-accessed prior to discharge.     Treatment Conditions:  Not Applicable.        Post Infusion Assessment:  Patient tolerated infusion without incident.         Discharge Plan:   Patient discharged in stable condition accompanied by: self.

## 2024-04-08 NOTE — TELEPHONE ENCOUNTER
Oncology Nurse Triage - Sickle Cell Pain Crisis:  Situation: Jennifer  calling about Sickle Cell Pain Crisis, requesting to be added on for IV fluids and pain medicine    Background:   Patient's last infusion was 4/3/24  Last clinic visit date:3/22/24 with Patricia Mantilla CNP  Next clinic visit is 4/8/24 with Patricia at 1230  Does patient have active treatment plan?  Yes    Assessment of Symptoms:  Onset/Duration of symptoms: 1 day    Is it typical sickle cell pain? Yes   Location: everywhere, back is very painful  Character: Sharp           Intensity: 8/10    Any radiation of pain, numbness, tingling, weakness, warmth, swelling, discoloration of arms or legs?No     Fever?No  Chest Pain Present: No   Shortness of breath: No     Other home therapies tried: HEAT/HEATING PAD and WARM BATH   Last home medication taken and when: 0615 oxycodone Ibuprofen, muscle relaxant    Any Refills Needed?: No     Does patient have transportation & length of time to get to clinic: Needs transportation. Please call if early apt offered. Has an apt with Patricia today at 1200.    Recommendations:   Added to infusion wait list.    If you do not hear from the infusion center by 2pm then you will not be able to get in for an infusion today. If symptoms worsen while waiting for call back, and/or you experience fever, chills, SOB, chest pain, cough, n/v, dizziness, numbness, swelling, discoloration of extremities, then seek emergency evaluation in Emergency Department.     Pt voiced understanding.

## 2024-04-08 NOTE — TELEPHONE ENCOUNTER
Patient confirmed scheduled appointment:  Date: 07/03/2024  Time: 2:15PM  Visit type: RTA  Provider: FLORA  [place]: [facility]  Testing/imaging: CXR, FPFT, FENO scheduled prior  Additional notes: N/A

## 2024-04-08 NOTE — NURSING NOTE
"Oncology Rooming Note    April 8, 2024 1:11 PM   Jennifer Cervantes is a 24 year old female who presents for:    Chief Complaint   Patient presents with    Port Draw     Labs drawn via port    RECHECK     Sickle cell here for provider visit     Initial Vitals: /75   Pulse 85   Temp 98.5  F (36.9  C) (Oral)   Resp 16   Wt 69 kg (152 lb 1.6 oz)   SpO2 95%   BMI 26.11 kg/m   Estimated body mass index is 26.11 kg/m  as calculated from the following:    Height as of 3/17/24: 1.626 m (5' 4\").    Weight as of this encounter: 69 kg (152 lb 1.6 oz). Body surface area is 1.77 meters squared.  Extreme Pain (8) Comment: Data Unavailable   No LMP recorded. Patient has had an implant.  Allergies reviewed: Yes  Medications reviewed: Yes    Medications: Medication refills not needed today.  Pharmacy name entered into [facility]: [place], [place] - [address] [place] [address] [phone]    Frailty Screening:   Is the patient here for a new oncology consult visit in cancer care? 2. No      Clinical concerns: Back pain      Yao Cordero RN              "

## 2024-04-08 NOTE — NURSING NOTE
Chief Complaint   Patient presents with    Port Draw     Labs drawn via port     Port accessed with 20g flat needle by RN, labs collected, line flushed with saline and heparin.  Vitals taken. Pt checked in for appointment(s).     Shantell ARMIJO RN PHN BSN  BMT/Oncology Lab

## 2024-04-08 NOTE — TELEPHONE ENCOUNTER
1134 secure chat to Patricia Mantilla to determine if she wants to see pt in clinic or in infusion at 1230, then infusion could take pt at 1300.   Patricia can see pt in infusion center at 1230.   1137 call to pt, informed that BMT infusion can take her at 1230 today and Patricia Mantilla will see her in infusion. Pt okay with this plan. Writer informed after 1215 labs, to head to infusion center. Pt voiced understanding.   1141 message sent to scheduling.

## 2024-04-08 NOTE — PROGRESS NOTES
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 She had multiple thromboembolic events in 2021 despite adherent anticoagulation use (though warfarin was perpetually low) and there are concerns for chronic thromboembolic disease but did not have pulmonary HTN on a November 2021 cath. She is maintained on chronic PO opioids and twice-weekly infusion visits (since 1/24/22) but has been able to be maintained on this regimen and has stayed out of the ED most of the time with even rarer admissions for most of 2023.     Interval History:  Jennifer is seen today in the infusion center for close follow-up.  -She has been hesitant to go to the ED or come to the infusion center the past few weeks as she feels her medical team associates her use of the infusion center with addiction to pain medication. Her mother has been encouraging her to continue to pursue infusion appointments for fluids and pain medication when needed. She was in the ED last on 4/5 for pain control  -She's experiencing increased pain in her left arm. At times she is unable to use the arm (her dominant arm) due to the pain. Her mother has had to assist her with bathing and feeding her at times because of this. She's also experiencing diffuse pain, often in the morning, that makes it hard for her to sit up. She gets out of bed slowly and takes a shower to be able to be functional  -She still hasn't been able to return to work and is considering quitting her job. Her manager remains supportive although she hasn't  worked for many weeks, possibly months.   -She is using oxycodone routinely at home but has been sure to utilize muscle relaxants, Tylenol and ibuprofen  -She continues to experience sharp abdominal pain although has been vomiting less  -Regarding her plans to possible pursue meeting with a new hematologist, she first plans to meet with Dr. Duncan at least one more time during her next scheduled visit with him in late April  -She is feeling anxious. She has a goal to completely discontinue oxycodone so that she can instead take Xanax for anxiety. She strongly feels that CBT or counseling would not be helpful for her.    Sickle Cell Disease Comprehensive Checklist  Bone Health/Avascular Necrosis Screening/Symptoms (each visit): no new concerns today  Leg Ulcer evaluation (every visit): no  Hypertension (every visit):stable 11/3/23  Last pulmonary evaluation (asthma, AMAN, pulm HTN): 9/28/22, due for follow-up  Stroke/silent cerebral infarct Hx (Y/N): Yes TIA ~2014, first event ~age 1 with full stroke and R sided weakness  Last PCP Visit: 3/6/23  Vaccines:  PCV13: 5/13/19  Pneumovax (PPSV23): 3/04, 10/09, 7/12/19 (next due 7/2024)  Menactra: 4/2010, 9/2015 (MCV done 8/16/21)  MenB: 9/16/15, 5/13/19  Influenza: 10/2/23  Audiology (chelation): done 2/27/24  Comparison to Previous Audiogram dated 6/6/2022:     Pure Tone Thresholds 250-8000 Hz:    RIGHT: stable    LEFT: stable     High Frequency Audiometry 9,000-16,000Hz:     RIGHT: stable    LEFT: stable     Word Recognition Score:    RIGHT: stable    LEFT: stable    Plan last reviewed with patient: 10/2/23    Patient background: 24 yo F, enjoys movies and kids though there are times where she does not really want to talk to people. Does not have a lot of social support at home.     Sickle Cell Disease History  Primary Hematologist Team: Jose Rafael Duncan  PCP: none  Genotype: SS  Acute Pain Crisis Treatment: (limiting IV)   ER   Dilaudid 1 mg IV q1h up to 3 doses  Toradol  30 mg IV x1   Maintenance IV fluids with LR  Other: Zofran 8 mg IV PRN nausea  Inpatient:  PCA Dilaudid 1 hr q30 minutes, no basal rate  Toradol 30 mg x6h x 4 hr  LR maintenance x 1-2 days  Other Medications: Zofran  ASA  Supportive Care: Docusate, Senna  Chronic Pain Medications:    Home regimen: oxycodone 15 mg p.o. q.4-6 hours p.r.n. breakthrough pain.  She also continues on Voltaren gel, and Zoloft among other medications.    -Also benefits from mental health visits, acupuncture  Baseline Hemoglobin: 7 g/dl without chronic transfusions  Hydroxyurea use: Yes  H/O blood transfusions: Yes, several (iron overload) Most recent 11/20/2021  H/O Transfusion Reactions: no  Antibodies:none  H/O Acute Chest Syndrome: Yes  Last episode:9/05/22 (previously 4/26/21, 10/2019)   ICU/intubation: not with 9/2022 admission  H/O Stroke: Yes (managed with chronic transfusions in the past, stopped late Spring 2020)  H/O VTE: Yes (2/2021)  H/O Cholecystectomy or Splenectomy: no  H/O Asthma, Pulm HTN, AVN, Leg Ulcers, Nephropathy, Retinopathy, etc: Iron overload, asthma, chronic lung disease, physical limitations from early stroke    ---------------------------------------  Jennifer Cervantes's Goals (did not review today)    1-3 month goal:      6 month goal:      12 month goal:      Disease-specific goal(s):  Decrease frequency of ED and infusion center visits. Ultimately would like to decrease oxycodone use.  ---------------------------------------      Current Outpatient Medications   Medication Sig Dispense Refill    aspirin (ASA) 81 MG chewable tablet Take 1 tablet (81 mg) by mouth 2 times daily 60 tablet 11    budesonide-formoterol (SYMBICORT) 160-4.5 MCG/ACT Inhaler Inhale 2 puffs twice daily plus 1-2 puffs as needed. May use up to 12 puffs per day. 20.4 g 11    budesonide-formoterol (SYMBICORT) 160-4.5 MCG/ACT Inhaler Inhale 2 puffs into the lungs 2 times daily 10.2 g 3    cetirizine (ZYRTEC) 10 MG tablet Take 1 tablet (10 mg) by  mouth daily 30 tablet 1    deferasirox (JADENU) 360 MG tablet Take 4 tablets (1,440 mg) by mouth every evening 120 tablet 4    diphenhydrAMINE (BENADRYL) 25 MG capsule Take 1 capsule (25 mg) by mouth every 6 hours as needed for itching or allergies 30 capsule 0    hydroxyurea (HYDREA) 500 MG capsule       Hydroxyurea 1000 MG TABS Take 3,000 mg by mouth daily 90 tablet 3    ibuprofen (ADVIL/MOTRIN) 600 MG tablet Take 600 mg by mouth every 6 hours as needed for moderate pain Using rarely, 2x/week at most      melatonin 5 MG tablet Take 1 tablet (5 mg) by mouth nightly as needed for sleep 30 tablet 1    methocarbamol (ROBAXIN) 750 MG tablet Take 1 tablet (750 mg) by mouth 4 times daily as needed for muscle spasms (during sickle pain crises. Okay to take scheduled while in pain) 60 tablet 1    ondansetron (ZOFRAN) 8 MG tablet Take 1 tablet (8 mg) by mouth every 8 hours as needed for nausea 30 tablet 1    oxyCODONE IR (ROXICODONE) 10 MG tablet Take 1 tablet (10 mg) by mouth every 4 hours as needed for severe pain or breakthrough pain Goal 4 per day. Max 6 per day. 20 tablet 0    acetaminophen (TYLENOL) 325 MG tablet Take 2 tablets (650 mg) by mouth every 6 hours as needed for mild pain (Patient not taking: Reported on 3/18/2024) 120 tablet 3    albuterol (PROAIR HFA/PROVENTIL HFA/VENTOLIN HFA) 108 (90 Base) MCG/ACT inhaler Inhale 2 puffs into the lungs every 6 hours as needed for shortness of breath or wheezing (Patient not taking: Reported on 3/18/2024) 8.5 g 3    albuterol (PROVENTIL) (2.5 MG/3ML) 0.083% neb solution Take 2 vials (5 mg) by nebulization every 6 hours as needed for shortness of breath or wheezing (Patient not taking: Reported on 3/18/2024) 90 mL 3    EPINEPHrine (ANY BX GENERIC EQUIV) 0.3 MG/0.3ML injection 2-pack Inject 0.3 mLs (0.3 mg) into the muscle as needed for anaphylaxis (Patient not taking: Reported on 3/18/2024) 1 each 1    naloxone (NARCAN) 4 MG/0.1ML nasal spray Spray 1 spray (4 mg) into one  nostril alternating nostrils as needed for opioid reversal every 2-3 minutes until assistance arrives (Patient not taking: Reported on 3/18/2024) 0.2 mL 0    naloxone (NARCAN) 4 MG/0.1ML nasal spray Spray 4 mg into one nostril alternating nostrils as needed for opioid reversal every 2-3 minutes until assistance arrives (Patient not taking: Reported on 2/5/2024)      omeprazole (PRILOSEC) 20 MG DR capsule Take 1 capsule (20 mg) by mouth daily (Patient not taking: Reported on 3/18/2024) 30 capsule 0       Past Medical History  Past Medical History:   Diagnosis Date    Anxiety     Bleeding disorder (H24)     Blood clotting disorder (H24)     Cerebral infarction (H) 2015    Cognitive developmental delay     low IQ. 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 side (H)     right hand contractures    Iron overload due to repeated red blood cell transfusions     Migraines     Multiple subsegmental pulmonary emboli without acute cor pulmonale (H) 02/01/2021    Oppositional defiant behavior     Presence of intrauterine contraceptive device 2/18/2020    Superficial venous thrombosis of arm, right 03/25/2021    Uncomplicated asthma      Past Surgical History:   Procedure Laterality Date    AS INSERT TUNNELED CV 2 CATH W/O PORT/PUMP      CHOLECYSTECTOMY      CV RIGHT HEART CATH MEASUREMENTS RECORDED N/A 11/18/2021    Procedure: Right Heart Cath;  Surgeon: Jackson Stauffer MD;  Location:  HEART CARDIAC CATH LAB    INSERT PORT VASCULAR ACCESS Left 4/21/2021    Procedure: INSERTION, VASCULAR ACCESS PORT (NOT SURE ON SIDE UNTIL REMOVAL);  Surgeon: Rajan More MD;  [place] [facility] [place]    IR CHEST PORT PLACEMENT > 5 YRS OF AGE  4/21/2021    IR CVC NON TUNNEL LINE REMOVAL  5/6/2021    IR CVC NON TUNNEL PLACEMENT > 5 YRS  04/07/2020    IR CVC NON TUNNEL PLACEMENT > 5 YRS  4/30/2021    IR CVC NON TUNNEL  PLACEMENT > 5 YRS  9/7/2022    IR PORT REMOVAL LEFT  4/21/2021    REMOVE PORT VASCULAR ACCESS Left 4/21/2021    Procedure: REMOVAL, VASCULAR ACCESS PORT LEFT;  Surgeon: Rajan More MD;  Location: [facility] OR    REPAIR TENDON ELBOW Right 10/02/2019    Procedure: Right Elbow Flexor Lengthening, Flexor Pronator Slide Of Wrist and Finger, Thumb Adductor Lengthening;  Surgeon: Anai Franco MD;  Location: [facility] OR    TONSILLECTOMY Bilateral 10/02/2019    Procedure: Bilateral Tonsillectomy;  Surgeon: Farhana Guy MD;  Location: [facility] OR    ZZC BREAST REDUCTION (INCLUDES LIPO) TIER 3 Bilateral 04/23/2019     Allergies   Allergen Reactions    Contrast Dye      Hives and breathing issues    Fish-Derived Products Hives    Seafood Hives    Adhesive Tape Hives     Primipore dressing causes hives    Gadolinium     Iodinated Contrast Media      Social History   Social History     Tobacco Use    Smoking status: Never     Passive exposure: Never    Smokeless tobacco: Never   Substance Use Topics    Alcohol use: Not Currently     Alcohol/week: 0.0 standard drinks of alcohol    Drug use: Never   Her mom lives next door to her.  Past medical history and social history were reviewed.    Physical Examination:  /75   Pulse 85   Temp 98.5  F (36.9  C) (Oral)   Resp 16   Wt 69 kg (152 lb 1.6 oz)   SpO2 95%   BMI 26.11 kg/m        Wt Readings from Last 10 Encounters:   04/08/24 69 kg (152 lb 1.6 oz)   04/05/24 68 kg (150 lb)   03/22/24 67.6 kg (149 lb)   03/18/24 69 kg (152 lb 3.2 oz)   03/17/24 70.3 kg (155 lb)   03/08/24 68 kg (150 lb)   03/01/24 68 kg (150 lb)   02/03/24 67.1 kg (148 lb)   01/26/24 66.2 kg (145 lb 14.4 oz)   01/12/24 66.2 kg (146 lb)     General: Pleasant female, NAD  Eyes: EOMI, PERRL  Respiratory: Normal effort, no adventitious breath sounds  Ext: no peripheral edema  Neurologic: Grossly nonfocal. A/O x 4.  Skin: No rashes, petechiae, or bruising noted on exposed skin. Port accessed in left  chest  Laboratory Data:  Most Recent 3 CBC's:  Recent Labs   Lab Test 04/08/24  1239 04/05/24  1037 03/29/24  0351   WBC 13.0* 11.8* 12.5*   HGB 8.0* 7.2* 7.4*   MCV 88 89 86   * 500* 503*   ANEUTAUTO 10.2* 7.6 7.6    Most Recent 3 BMP's:  Recent Labs   Lab Test 04/08/24  1239 04/05/24  1037 03/29/24  0351 03/22/24  1045 03/17/24  0349    138 135   < > 137   POTASSIUM 3.8 3.8 3.9   < > 3.8   CHLORIDE 106 106 102   < > 104   CO2 22 23 21*   < > 22   BUN 6.6 7.9 8.3   < > 6.1   CR 0.46* 0.50* 0.53   < > 0.48*   ANIONGAP 10 9 12   < > 11   MICAH 9.0 8.7 8.7   < > 8.5*   GLC 86 88 91   < > 86   PROTTOTAL 7.9 7.1  --   --  6.9   ALBUMIN 4.6 4.4  --   --  4.3    < > = values in this interval not displayed.    Most Recent 2 LFT's:  Recent Labs   Lab Test 04/08/24  1239 04/05/24  1037   AST 45 51*   ALT 19 27   ALKPHOS 65 60   BILITOTAL 3.3* 3.1*    Most Recent TSH and T4:No lab results found.  Phos/Mag:  Lab Results   Component Value Date    PHOS 4.8 (H) 05/13/2023    PHOS 5.0 (H) 05/12/2023    PHOS 3.6 02/21/2021    MAG 2.0 11/11/2021    MAG 1.7 02/21/2021    MAG 2.1 02/02/2021       Latest Reference Range & Units 12/28/23 11:11 01/26/24 11:05 03/18/24 12:02   Ferritin 6 - 175 ng/mL 5,430 (H) 5,234 (H) 3,847 (H)     I reviewed the above labs today.    Assessment and Plan:  1. Sickle Cell HgbSS Disease  2. Acute pain crises  3. Chronic Pain  4. 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 History of CVA  7. Hearing loss  8. Nausea/vomiting     Jennifer is seen today for close follow-up due to ongoing concerns with her care team and goals of care discussions. She voices frustration with her increase in pain lately. She continues to utilize the infusion center twice a week as able which I reiterated is appropriate to do in cases where she is experiencing increased pain. I also stressed to Jennifer that limiting ED use is an ongoing goal in her case although this does  not need to prevent her from going to the ED in cases where her symptoms are unmanageable at home. She plans to have further discussion regarding communication concerns and goals of care with Dr. Duncan in late April. I encouraged her to keep this appointment. I will see her shortly before their clinic appointment per her request. We discussed ways to focus her thoughts and goal during that visits, including writing down questions or concerns so that she is able to refer to these points during her visit.    She is considering quitting her job as she has not been able to work for many months. She did not request an additional work letter today. Her GI symptoms are improving although she reports persistent epigastric pain. The previously requested upper GI endoscopy was not scheduled and I have requested this today. She is also due for a Ferriscan which we will attempt to schedule prior to her visit with Dr. Duncan in late April.     I again offered to arrange a mental health/counseling referral for her anxiety concerns. She is focused on pharmacologic management of anxiety, specifically with Xanax which she's used in the past, although based on previous discussion this is not a safe option to use concurrently with chronic opioids. I ensured her that CBT could be initiated at any time she is ready. Although she is able to open up to certain members of her care team, she feels this is generally very difficult for her and would limit her success with therapy.     -------------------------------------  Plan:  -Upper GI endoscopy for evaluation of persistent n/v  -Continue Hydrea to 3000mg daily to help lessen frequency of sickle cell pain.   -Continue monthly crizanlizumab infusions.  -Continue slow taper of oxycodone. Currently receiving oxycodone 10 mg every 4 hours PRN, qty 20. Plan to tapering to qty 15 when she is agreeable.  -She can self-reduce infusion days/week and we will continue to keep the cap at 2/week  for now.  -Continue infusion center visits limited to two times per week (Mondays and Fridays ideally although still needs to call to request). Continue diligent home management with current medications, heat, rest, compression, warm baths. Methocarbamol was recently added which Jennifer is utilizing and finds helpful.  -If unable to manage at home can go to ED  with continued plan to use IV Dilaudid and take a break from ketamine (10/2/23). No PCA use and goal for any admission would still be to discharge by 5 days or less  - Continue Jadenu. Repeat Ferriscan in 4-6 months (January-March 2024). Will consider deferiprone after Ferriscan  -Continue aspirin BID  -RTC with Dr. Duncan in ~1 month, scheduled for 4/29/24. RTC with me 4/26.    Patricia Mantilla CNP  ---  60 minutes spent on the date of the encounter doing chart review, review of test results, interpretation of tests, patient visit, and documentation.    The longitudinal plan of care for the diagnosis(es)/condition(s) as documented were addressed during this visit. Due to the added complexity in care, I will continue to support Jennifer in the subsequent management and with ongoing continuity of care.

## 2024-04-09 ENCOUNTER — NURSE TRIAGE (OUTPATIENT)
Dept: ONCOLOGY | Facility: CLINIC | Age: 24
End: 2024-04-09
Payer: COMMERCIAL

## 2024-04-09 NOTE — TELEPHONE ENCOUNTER
Jennifer is out of work right now, needs a letter to go to her manager letting her manager know what is going on with her health, and to let them know she will be out until further notice.

## 2024-04-10 ENCOUNTER — INFUSION THERAPY VISIT (OUTPATIENT)
Dept: TRANSPLANT | Facility: CLINIC | Age: 24
End: 2024-04-10
Attending: PEDIATRICS
Payer: COMMERCIAL

## 2024-04-10 ENCOUNTER — NURSE TRIAGE (OUTPATIENT)
Dept: ONCOLOGY | Facility: CLINIC | Age: 24
End: 2024-04-10
Payer: COMMERCIAL

## 2024-04-10 VITALS
SYSTOLIC BLOOD PRESSURE: 129 MMHG | OXYGEN SATURATION: 92 % | DIASTOLIC BLOOD PRESSURE: 82 MMHG | TEMPERATURE: 98.8 F | HEART RATE: 97 BPM | RESPIRATION RATE: 18 BRPM

## 2024-04-10 DIAGNOSIS — G81.10 SPASTIC HEMIPLEGIA, UNSPECIFIED ETIOLOGY, UNSPECIFIED LATERALITY (H): ICD-10-CM

## 2024-04-10 DIAGNOSIS — D57.00 SICKLE CELL PAIN CRISIS (H): Primary | ICD-10-CM

## 2024-04-10 DIAGNOSIS — D57.00 SICKLE CELL PAIN CRISIS (H): ICD-10-CM

## 2024-04-10 PROCEDURE — 96376 TX/PRO/DX INJ SAME DRUG ADON: CPT

## 2024-04-10 PROCEDURE — 250N000013 HC RX MED GY IP 250 OP 250 PS 637: Performed by: PEDIATRICS

## 2024-04-10 PROCEDURE — 96375 TX/PRO/DX INJ NEW DRUG ADDON: CPT

## 2024-04-10 PROCEDURE — 258N000003 HC RX IP 258 OP 636: Performed by: PEDIATRICS

## 2024-04-10 PROCEDURE — 250N000011 HC RX IP 250 OP 636: Performed by: PEDIATRICS

## 2024-04-10 PROCEDURE — 96374 THER/PROPH/DIAG INJ IV PUSH: CPT

## 2024-04-10 PROCEDURE — 96361 HYDRATE IV INFUSION ADD-ON: CPT

## 2024-04-10 RX ORDER — HEPARIN SODIUM (PORCINE) LOCK FLUSH IV SOLN 100 UNIT/ML 100 UNIT/ML
5 SOLUTION INTRAVENOUS
Status: CANCELLED | OUTPATIENT
Start: 2024-07-01

## 2024-04-10 RX ORDER — HEPARIN SODIUM,PORCINE 10 UNIT/ML
5 VIAL (ML) INTRAVENOUS
Status: CANCELLED | OUTPATIENT
Start: 2024-07-01

## 2024-04-10 RX ORDER — ONDANSETRON 2 MG/ML
8 INJECTION INTRAMUSCULAR; INTRAVENOUS EVERY 6 HOURS PRN
Status: DISCONTINUED | OUTPATIENT
Start: 2024-04-10 | End: 2024-04-10 | Stop reason: HOSPADM

## 2024-04-10 RX ORDER — OXYCODONE HYDROCHLORIDE 10 MG/1
10 TABLET ORAL EVERY 4 HOURS PRN
Qty: 20 TABLET | Refills: 0 | Status: SHIPPED | OUTPATIENT
Start: 2024-04-12 | End: 2024-04-17

## 2024-04-10 RX ORDER — ONDANSETRON 2 MG/ML
8 INJECTION INTRAMUSCULAR; INTRAVENOUS EVERY 6 HOURS PRN
Status: CANCELLED
Start: 2024-07-01

## 2024-04-10 RX ORDER — HEPARIN SODIUM (PORCINE) LOCK FLUSH IV SOLN 100 UNIT/ML 100 UNIT/ML
5 SOLUTION INTRAVENOUS
Status: DISCONTINUED | OUTPATIENT
Start: 2024-04-10 | End: 2024-04-10 | Stop reason: HOSPADM

## 2024-04-10 RX ORDER — ONDANSETRON 4 MG/1
8 TABLET, FILM COATED ORAL
Status: CANCELLED
Start: 2024-07-01

## 2024-04-10 RX ORDER — KETOROLAC TROMETHAMINE 30 MG/ML
30 INJECTION, SOLUTION INTRAMUSCULAR; INTRAVENOUS ONCE
Status: COMPLETED | OUTPATIENT
Start: 2024-04-10 | End: 2024-04-10

## 2024-04-10 RX ORDER — DIPHENHYDRAMINE HCL 25 MG
50 CAPSULE ORAL
Status: COMPLETED | OUTPATIENT
Start: 2024-04-10 | End: 2024-04-10

## 2024-04-10 RX ORDER — DIPHENHYDRAMINE HCL 25 MG
50 CAPSULE ORAL
Status: CANCELLED
Start: 2024-07-01

## 2024-04-10 RX ORDER — KETOROLAC TROMETHAMINE 30 MG/ML
30 INJECTION, SOLUTION INTRAMUSCULAR; INTRAVENOUS ONCE
Status: CANCELLED
Start: 2024-07-01 | End: 2024-07-01

## 2024-04-10 RX ADMIN — SODIUM CHLORIDE, POTASSIUM CHLORIDE, SODIUM LACTATE AND CALCIUM CHLORIDE 1000 ML: 600; 310; 30; 20 INJECTION, SOLUTION INTRAVENOUS at 09:03

## 2024-04-10 RX ADMIN — HYDROMORPHONE HYDROCHLORIDE 1 MG: 1 INJECTION, SOLUTION INTRAMUSCULAR; INTRAVENOUS; SUBCUTANEOUS at 11:09

## 2024-04-10 RX ADMIN — KETOROLAC TROMETHAMINE 30 MG: 30 INJECTION, SOLUTION INTRAMUSCULAR; INTRAVENOUS at 09:39

## 2024-04-10 RX ADMIN — HYDROMORPHONE HYDROCHLORIDE 1 MG: 1 INJECTION, SOLUTION INTRAMUSCULAR; INTRAVENOUS; SUBCUTANEOUS at 10:05

## 2024-04-10 RX ADMIN — ONDANSETRON 8 MG: 2 INJECTION INTRAMUSCULAR; INTRAVENOUS at 09:05

## 2024-04-10 RX ADMIN — HYDROMORPHONE HYDROCHLORIDE 1 MG: 1 INJECTION, SOLUTION INTRAMUSCULAR; INTRAVENOUS; SUBCUTANEOUS at 09:04

## 2024-04-10 RX ADMIN — DIPHENHYDRAMINE HYDROCHLORIDE 50 MG: 25 CAPSULE ORAL at 09:04

## 2024-04-10 RX ADMIN — Medication 5 ML: at 11:14

## 2024-04-10 ASSESSMENT — PAIN SCALES - GENERAL: PAINLEVEL: EXTREME PAIN (9)

## 2024-04-10 NOTE — TELEPHONE ENCOUNTER
Oncology Nurse Triage - Sickle Cell Pain Crisis:  Situation: Jennifer  calling about Sickle Cell Pain Crisis, requesting to be added on for IV fluids and pain medicine    Background:   Patient's last infusion was 4/8/24  Last clinic visit date: 4/8/24 with Patricia Mantilla CNP  Does patient have active treatment plan?  Yes    Assessment of Symptoms:  Onset/Duration of symptoms: 1 day    Is it typical sickle cell pain? Yes   Location: both sides  Character: Stabbing           Intensity: 9/10    Any radiation of pain, numbness, tingling, weakness, warmth, swelling, discoloration of arms or legs?No     Fever?No  Chest Pain Present: No   Shortness of breath: No     Other home therapies tried: HEAT/HEATING PAD and WARM BATH   Last home medication taken and when: 0600, oxycodone ibuprofen    Any Refills Needed?: Yes No se oxycodone. Out Friday    Does patient have transportation & length of time to get to clinic: No Will try to get ride if early opening offered.       Recommendations:   Added to Infusion Wait list.    If you do not hear from the infusion center by 2pm then you will not be able to get in for an infusion today. If symptoms worsen while waiting for call back, and/or you experience fever, chills, SOB, chest pain, cough, n/v, dizziness, numbness, swelling, discoloration of extremities, then seek emergency evaluation in Emergency Department.     Please note, if you are late for your appt, you risk losing your infusion appt as it may delay another patient's infusion who arrived on time.       0725: BMT can offer apt at 0830  0727: Called Jennifer and she confirmed she can come to the clinic at that time. Has ride to clinic.   Updated BMT charge nurse.  0742: Message sent to [facility] to schedule.    Routed to Care Team.

## 2024-04-10 NOTE — TELEPHONE ENCOUNTER
Narcotic Refill Request  Person Requesting Refill: Jennifer    Medication(s) requested:  oxycodone IR 10 mg tabelt  Last fill date and by whom:  4/3/24 with Estefany Thibodeaux MD  What pain is the medication treating: sickle cell pain  How is the medication being taken?:1 tablet Q 4H  Does pt have enough for today? Yes. Will be out Friday  Is pain being adequately controlled on the current regimen?: yes  Experiencing any side effects from medication?: No    Date of most recent appointment:  4/8/24 with Patricia Mantilla CNP  Any No Show Visits:No  Next appointment:   4/29/24 with Dr. Duncan     Reviewed: No    Routed/Paged provider: Patricia Mantilla CNP

## 2024-04-10 NOTE — PROGRESS NOTES
Infusion Nursing Note:  Jennifer Cervantes presents today for IVF and pain meds.    Patient seen by provider today: No   present during visit today: Not Applicable.    Note: Pt received 1L Lactated Ringer given, PRN Dilaudid x3, IV Zofran, IV Toradol and PO Benadryl. Allergies and home medication reviewed. Pt presented to infusion rating pain a 9/10 and discharged at a 6/10.      Intravenous Access:  Implanted Port.    Treatment Conditions: No labs drawn.       Post Infusion Assessment:  Patient tolerated infusion without incident.  Blood return noted pre and post infusion.       Discharge Plan:   Patient discharged in stable condition.    Radha Galindo RN

## 2024-04-11 ENCOUNTER — TELEPHONE (OUTPATIENT)
Dept: ONCOLOGY | Facility: CLINIC | Age: 24
End: 2024-04-11
Payer: COMMERCIAL

## 2024-04-11 ENCOUNTER — MYC MEDICAL ADVICE (OUTPATIENT)
Dept: ONCOLOGY | Facility: CLINIC | Age: 24
End: 2024-04-11
Payer: COMMERCIAL

## 2024-04-11 ENCOUNTER — PATIENT OUTREACH (OUTPATIENT)
Dept: ONCOLOGY | Facility: CLINIC | Age: 24
End: 2024-04-11
Payer: COMMERCIAL

## 2024-04-11 ENCOUNTER — HOSPITAL ENCOUNTER (EMERGENCY)
Facility: CLINIC | Age: 24
Discharge: HOME | End: 2024-04-12
Attending: EMERGENCY MEDICINE | Admitting: EMERGENCY MEDICINE
Payer: COMMERCIAL

## 2024-04-11 VITALS
TEMPERATURE: 99.1 F | BODY MASS INDEX: 26.57 KG/M2 | OXYGEN SATURATION: 91 % | HEART RATE: 89 BPM | SYSTOLIC BLOOD PRESSURE: 124 MMHG | DIASTOLIC BLOOD PRESSURE: 80 MMHG | WEIGHT: 154.8 LBS | RESPIRATION RATE: 18 BRPM

## 2024-04-11 DIAGNOSIS — R10.13 EPIGASTRIC PAIN: Primary | ICD-10-CM

## 2024-04-11 DIAGNOSIS — D57.00 SICKLE CELL PAIN CRISIS (H): ICD-10-CM

## 2024-04-11 LAB
ALBUMIN SERPL BCG-MCNC: 4.1 G/DL (ref 3.5–5.2)
ALP SERPL-CCNC: 63 U/L (ref 40–150)
ALT SERPL W P-5'-P-CCNC: 19 U/L (ref 0–50)
ANION GAP SERPL CALCULATED.3IONS-SCNC: 10 MMOL/L (ref 7–15)
AST SERPL W P-5'-P-CCNC: 51 U/L (ref 0–45)
BASOPHILS # BLD AUTO: 0.2 10E3/UL (ref 0–0.2)
BASOPHILS NFR BLD AUTO: 2 %
BILIRUB SERPL-MCNC: 3.1 MG/DL
BUN SERPL-MCNC: 7.7 MG/DL (ref 6–20)
CALCIUM SERPL-MCNC: 8.5 MG/DL (ref 8.6–10)
CHLORIDE SERPL-SCNC: 103 MMOL/L (ref 98–107)
CREAT SERPL-MCNC: 0.5 MG/DL (ref 0.51–0.95)
DEPRECATED HCO3 PLAS-SCNC: 24 MMOL/L (ref 22–29)
EGFRCR SERPLBLD CKD-EPI 2021: >90 ML/MIN/1.73M2
EOSINOPHIL # BLD AUTO: 0.6 10E3/UL (ref 0–0.7)
EOSINOPHIL NFR BLD AUTO: 5 %
ERYTHROCYTE [DISTWIDTH] IN BLOOD BY AUTOMATED COUNT: 24.8 % (ref 10–15)
GLUCOSE SERPL-MCNC: 97 MG/DL (ref 70–99)
HCT VFR BLD AUTO: 20.1 % (ref 35–47)
HGB BLD-MCNC: 7.3 G/DL (ref 11.7–15.7)
IMM GRANULOCYTES # BLD: 0.1 10E3/UL
IMM GRANULOCYTES NFR BLD: 1 %
LYMPHOCYTES # BLD AUTO: 2.7 10E3/UL (ref 0.8–5.3)
LYMPHOCYTES NFR BLD AUTO: 20 %
MCH RBC QN AUTO: 31.9 PG (ref 26.5–33)
MCHC RBC AUTO-ENTMCNC: 36.3 G/DL (ref 31.5–36.5)
MCV RBC AUTO: 88 FL (ref 78–100)
MONOCYTES # BLD AUTO: 1 10E3/UL (ref 0–1.3)
MONOCYTES NFR BLD AUTO: 8 %
NEUTROPHILS # BLD AUTO: 8.5 10E3/UL (ref 1.6–8.3)
NEUTROPHILS NFR BLD AUTO: 64 %
NRBC # BLD AUTO: 0.4 10E3/UL
NRBC BLD AUTO-RTO: 3 /100
PLATELET # BLD AUTO: 444 10E3/UL (ref 150–450)
POTASSIUM SERPL-SCNC: 4.2 MMOL/L (ref 3.4–5.3)
PROT SERPL-MCNC: 6.8 G/DL (ref 6.4–8.3)
RBC # BLD AUTO: 2.29 10E6/UL (ref 3.8–5.2)
SODIUM SERPL-SCNC: 137 MMOL/L (ref 135–145)
WBC # BLD AUTO: 13.1 10E3/UL (ref 4–11)

## 2024-04-11 PROCEDURE — 250N000011 HC RX IP 250 OP 636: Performed by: EMERGENCY MEDICINE

## 2024-04-11 PROCEDURE — 85025 COMPLETE CBC W/AUTO DIFF WBC: CPT | Performed by: EMERGENCY MEDICINE

## 2024-04-11 PROCEDURE — 250N000009 HC RX 250: Performed by: EMERGENCY MEDICINE

## 2024-04-11 PROCEDURE — 96374 THER/PROPH/DIAG INJ IV PUSH: CPT | Performed by: EMERGENCY MEDICINE

## 2024-04-11 PROCEDURE — 85045 AUTOMATED RETICULOCYTE COUNT: CPT | Performed by: EMERGENCY MEDICINE

## 2024-04-11 PROCEDURE — 258N000003 HC RX IP 258 OP 636: Performed by: EMERGENCY MEDICINE

## 2024-04-11 PROCEDURE — 80053 COMPREHEN METABOLIC PANEL: CPT | Performed by: EMERGENCY MEDICINE

## 2024-04-11 PROCEDURE — 96361 HYDRATE IV INFUSION ADD-ON: CPT | Performed by: EMERGENCY MEDICINE

## 2024-04-11 PROCEDURE — 85060 BLOOD SMEAR INTERPRETATION: CPT | Performed by: PATHOLOGY

## 2024-04-11 PROCEDURE — 36415 COLL VENOUS BLD VENIPUNCTURE: CPT | Performed by: EMERGENCY MEDICINE

## 2024-04-11 PROCEDURE — 99285 EMERGENCY DEPT VISIT HI MDM: CPT | Performed by: EMERGENCY MEDICINE

## 2024-04-11 PROCEDURE — 99284 EMERGENCY DEPT VISIT MOD MDM: CPT | Mod: 25 | Performed by: EMERGENCY MEDICINE

## 2024-04-11 PROCEDURE — 96375 TX/PRO/DX INJ NEW DRUG ADDON: CPT | Performed by: EMERGENCY MEDICINE

## 2024-04-11 RX ORDER — KETOROLAC TROMETHAMINE 30 MG/ML
30 INJECTION, SOLUTION INTRAMUSCULAR; INTRAVENOUS ONCE
Status: COMPLETED | OUTPATIENT
Start: 2024-04-11 | End: 2024-04-11

## 2024-04-11 RX ORDER — IPRATROPIUM BROMIDE AND ALBUTEROL SULFATE 2.5; .5 MG/3ML; MG/3ML
3 SOLUTION RESPIRATORY (INHALATION) ONCE
Status: COMPLETED | OUTPATIENT
Start: 2024-04-11 | End: 2024-04-11

## 2024-04-11 RX ORDER — KETOROLAC TROMETHAMINE 15 MG/ML
15 INJECTION, SOLUTION INTRAMUSCULAR; INTRAVENOUS ONCE
Status: DISCONTINUED | OUTPATIENT
Start: 2024-04-11 | End: 2024-04-11

## 2024-04-11 RX ADMIN — SODIUM CHLORIDE, POTASSIUM CHLORIDE, SODIUM LACTATE AND CALCIUM CHLORIDE 1000 ML: 600; 310; 30; 20 INJECTION, SOLUTION INTRAVENOUS at 23:02

## 2024-04-11 RX ADMIN — IPRATROPIUM BROMIDE AND ALBUTEROL SULFATE 3 ML: .5; 3 SOLUTION RESPIRATORY (INHALATION) at 23:44

## 2024-04-11 RX ADMIN — KETOROLAC TROMETHAMINE 30 MG: 30 INJECTION, SOLUTION INTRAMUSCULAR at 22:59

## 2024-04-11 RX ADMIN — HYDROMORPHONE HYDROCHLORIDE 1 MG: 1 INJECTION, SOLUTION INTRAMUSCULAR; INTRAVENOUS; SUBCUTANEOUS at 23:44

## 2024-04-11 ASSESSMENT — COLUMBIA-SUICIDE SEVERITY RATING SCALE - C-SSRS
2. HAVE YOU ACTUALLY HAD ANY THOUGHTS OF KILLING YOURSELF IN THE PAST MONTH?: NO
1. IN THE PAST MONTH, HAVE YOU WISHED YOU WERE DEAD OR WISHED YOU COULD GO TO SLEEP AND NOT WAKE UP?: NO
6. HAVE YOU EVER DONE ANYTHING, STARTED TO DO ANYTHING, OR PREPARED TO DO ANYTHING TO END YOUR LIFE?: NO

## 2024-04-11 ASSESSMENT — ACTIVITIES OF DAILY LIVING (ADL): ADLS_ACUITY_SCORE: 35

## 2024-04-11 NOTE — PROGRESS NOTES
[facility]: Cancer Care Follow-Up Note                                    Discussion with Patient:                                                      Letter created by Patricia SANTIAGO  Sent instructions to pt on how to access via [employer].  Will leave copy upfront for  also.         Goals          General     Pain Management (pt-stated)      Notes - Note created  10/13/2023  3:51 PM by Arely Krueger RN     Goal Statement: I will establish a plan for preventing and managing pain.  Date Goal set: 10/13/2023  Barriers: disease burden, multiple diagnoses, and transportation  Strengths: motivation, health awareness, and involvement with care team  Date to Achieve By: ongoing  Patient expressed understanding of goal: Yes  Action steps to achieve this goal:  I will take medications as prescribed.   I will call triage with new or worsening pain not controlled by medication.   I will use alternative therapies as directed. (Ice, heat, massage, etc)   I will establish care will palliative care department for ongoing pain management as needed.   I will call triage for medication refills when there are 2-3 days of medication remaining.                 Dates of Treatment:                                                      Infusion given in last 28 days       None            Assessment:                                                        Plan of Care Education Review:   Assessment completed with:: Patient    Plan of Care Education        Evaluation of Learning  Patient Education Provided: Yes  Method:: Explanation           Intervention/Education provided during outreach:                                                         Patient to follow up as scheduled at next appt  Patient to call/[employer] message with updates  Confirmed patient has clinic and triage numbers    Signature:  Arely Krueger RN

## 2024-04-11 NOTE — TELEPHONE ENCOUNTER
Jennifer calling requesting needs another letter for medical leave for employer.     Please call Jennifer with any questions or send letter via [employer].     This writer informed Jennifer will send this patient request to RNCURRY Wilkinson

## 2024-04-12 ENCOUNTER — HOSPITAL ENCOUNTER (OUTPATIENT)
Facility: AMBULATORY SURGERY CENTER | Age: 24
End: 2024-04-12
Attending: STUDENT IN AN ORGANIZED HEALTH CARE EDUCATION/TRAINING PROGRAM
Payer: COMMERCIAL

## 2024-04-12 ENCOUNTER — TELEPHONE (OUTPATIENT)
Dept: GASTROENTEROLOGY | Facility: CLINIC | Age: 24
End: 2024-04-12
Payer: COMMERCIAL

## 2024-04-12 LAB
PATH REPORT.COMMENTS IMP SPEC: NORMAL
PATH REPORT.COMMENTS IMP SPEC: NORMAL
PATH REPORT.FINAL DX SPEC: NORMAL
PATH REPORT.MICROSCOPIC SPEC OTHER STN: NORMAL
PATH REPORT.MICROSCOPIC SPEC OTHER STN: NORMAL
PATH REPORT.RELEVANT HX SPEC: NORMAL
RETICS # AUTO: NORMAL 10*3/UL
RETICS/RBC NFR AUTO: NORMAL %

## 2024-04-12 PROCEDURE — 96376 TX/PRO/DX INJ SAME DRUG ADON: CPT | Performed by: EMERGENCY MEDICINE

## 2024-04-12 PROCEDURE — 250N000011 HC RX IP 250 OP 636: Performed by: EMERGENCY MEDICINE

## 2024-04-12 PROCEDURE — 96375 TX/PRO/DX INJ NEW DRUG ADDON: CPT | Performed by: EMERGENCY MEDICINE

## 2024-04-12 PROCEDURE — 36415 COLL VENOUS BLD VENIPUNCTURE: CPT | Performed by: EMERGENCY MEDICINE

## 2024-04-12 PROCEDURE — 96361 HYDRATE IV INFUSION ADD-ON: CPT | Performed by: EMERGENCY MEDICINE

## 2024-04-12 RX ORDER — ONDANSETRON 2 MG/ML
4 INJECTION INTRAMUSCULAR; INTRAVENOUS EVERY 30 MIN PRN
Status: DISCONTINUED | OUTPATIENT
Start: 2024-04-12 | End: 2024-04-12 | Stop reason: HOSPADM

## 2024-04-12 RX ORDER — HEPARIN SODIUM (PORCINE) LOCK FLUSH IV SOLN 100 UNIT/ML 100 UNIT/ML
5-10 SOLUTION INTRAVENOUS
Status: DISCONTINUED | OUTPATIENT
Start: 2024-04-12 | End: 2024-04-12 | Stop reason: HOSPADM

## 2024-04-12 RX ADMIN — ONDANSETRON 4 MG: 2 INJECTION INTRAMUSCULAR; INTRAVENOUS at 01:15

## 2024-04-12 RX ADMIN — HYDROMORPHONE HYDROCHLORIDE 1 MG: 1 INJECTION, SOLUTION INTRAMUSCULAR; INTRAVENOUS; SUBCUTANEOUS at 00:58

## 2024-04-12 RX ADMIN — HYDROMORPHONE HYDROCHLORIDE 1 MG: 1 INJECTION, SOLUTION INTRAMUSCULAR; INTRAVENOUS; SUBCUTANEOUS at 02:48

## 2024-04-12 RX ADMIN — HEPARIN 5 ML: 100 SYRINGE at 04:04

## 2024-04-12 ASSESSMENT — ACTIVITIES OF DAILY LIVING (ADL)
ADLS_ACUITY_SCORE: 37

## 2024-04-12 NOTE — ED PROVIDER NOTES
[facility] EMERGENCY DEPARTMENT ([facility])    4/11/24      ED PROVIDER NOTE     History     Chief Complaint   Patient presents with    Generalized Body Aches     Left sided body ache.    Wheezing     HPI  Jennifer Cervantes is a 24 year old female with a past medical history of sickle cell anemia and asthma who presents to the ED for evaluation of generalized body aches and wheezing. Patient reports sharp left sided pain that started this evening. Patient states it is a burning sensation. She got in the shower and had some symptom relief, but it returned once she got out. Patient reports taking ibuprofen, muscle relaxer, and one dose of oxycodone with no relief. Patient also reports worsening asthma over the past few days. Earlier today she reports some wheezing, but states she took a nebulizer treatment and felt better. She states albuterol helps her symptoms. Patient notes her sickle cell occurs all over. Patient reports she usually takes takes 6 tabs of oxycodone max daily, but is trying to slowly wean off. She is currently trying to take 3 tabs max per day. Patient states she is also on Symbicort twice daily. Denies chest pain or shortness of breath. Denies recent trauma or falls. Denies nausea, vomiting, or fevers. Denies worsening pain with deep inspiration. Denies urinary symptoms.     Past Medical History  Past Medical History:   Diagnosis Date    Anxiety     Bleeding disorder (H24)     Blood clotting disorder (H24)     Cerebral infarction (H) 2015    Cognitive developmental delay     low IQ. 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 side (H)     right hand contractures    Iron overload due to repeated red blood cell transfusions     Migraines     Multiple subsegmental pulmonary emboli without acute cor pulmonale (H) 02/01/2021    Oppositional defiant behavior     Presence of  intrauterine contraceptive device 2/18/2020    Superficial venous thrombosis of arm, right 03/25/2021    Uncomplicated asthma      Past Surgical History:   Procedure Laterality Date    AS INSERT TUNNELED CV 2 CATH W/O PORT/PUMP      CHOLECYSTECTOMY      CV RIGHT HEART CATH MEASUREMENTS RECORDED N/A 11/18/2021    Procedure: Right Heart Cath;  Surgeon: Jackson Stauffer MD;  Location:  HEART CARDIAC CATH LAB    INSERT PORT VASCULAR ACCESS Left 4/21/2021    Procedure: INSERTION, VASCULAR ACCESS PORT (NOT SURE ON SIDE UNTIL REMOVAL);  Surgeon: Rajan More MD;  Location: [facility] OR    IR CHEST PORT PLACEMENT > 5 YRS OF AGE  4/21/2021    IR CVC NON TUNNEL LINE REMOVAL  5/6/2021    IR CVC NON TUNNEL PLACEMENT > 5 YRS  04/07/2020    IR CVC NON TUNNEL PLACEMENT > 5 YRS  4/30/2021    IR CVC NON TUNNEL PLACEMENT > 5 YRS  9/7/2022    IR PORT REMOVAL LEFT  4/21/2021    REMOVE PORT VASCULAR ACCESS Left 4/21/2021    Procedure: REMOVAL, VASCULAR ACCESS PORT LEFT;  Surgeon: Rajan More MD;  Location: [facility] OR    REPAIR TENDON ELBOW Right 10/02/2019    Procedure: Right Elbow Flexor Lengthening, Flexor Pronator Slide Of Wrist and Finger, Thumb Adductor Lengthening;  Surgeon: Anai Franco MD;  Location: [facility] OR    TONSILLECTOMY Bilateral 10/02/2019    Procedure: Bilateral Tonsillectomy;  Surgeon: Farhana Guy MD;  Location: [facility] OR    ZZC BREAST REDUCTION (INCLUDES LIPO) TIER 3 Bilateral 04/23/2019     acetaminophen (TYLENOL) 325 MG tablet  albuterol (PROAIR HFA/PROVENTIL HFA/VENTOLIN HFA) 108 (90 Base) MCG/ACT inhaler  albuterol (PROVENTIL) (2.5 MG/3ML) 0.083% neb solution  aspirin (ASA) 81 MG chewable tablet  budesonide-formoterol (SYMBICORT) 160-4.5 MCG/ACT Inhaler  budesonide-formoterol (SYMBICORT) 160-4.5 MCG/ACT Inhaler  cetirizine (ZYRTEC) 10 MG tablet  deferasirox (JADENU) 360 MG tablet  diphenhydrAMINE (BENADRYL) 25 MG capsule  EPINEPHrine (ANY BX GENERIC EQUIV) 0.3 MG/0.3ML injection  2-pack  hydroxyurea (HYDREA) 500 MG capsule  Hydroxyurea 1000 MG TABS  ibuprofen (ADVIL/MOTRIN) 600 MG tablet  melatonin 5 MG tablet  methocarbamol (ROBAXIN) 750 MG tablet  naloxone (NARCAN) 4 MG/0.1ML nasal spray  naloxone (NARCAN) 4 MG/0.1ML nasal spray  omeprazole (PRILOSEC) 20 MG DR capsule  ondansetron (ZOFRAN) 8 MG tablet  [START ON 4/12/2024] oxyCODONE IR (ROXICODONE) 10 MG tablet      Allergies   Allergen Reactions    Contrast Dye      Hives and breathing issues    Fish-Derived Products Hives    Seafood Hives    Adhesive Tape Hives     Primipore dressing causes hives    Gadolinium     Iodinated Contrast Media      Family History  Family History   Problem Relation Age of Onset    Sickle Cell Trait Mother     Hypertension Mother     Asthma Mother     Sickle Cell Trait Father     Glaucoma No family hx of     Macular Degeneration No family hx of     Diabetes No family hx of     Gout No family hx of      Social History   Social History     Tobacco Use    Smoking status: Never     Passive exposure: Never    Smokeless tobacco: Never   Substance Use Topics    Alcohol use: Not Currently     Alcohol/week: 0.0 standard drinks of alcohol    Drug use: Never         A medically appropriate review of systems was performed with pertinent positives and negatives noted in the HPI, and all other systems negative.    Physical Exam   BP: 124/80  Pulse: 89  Temp: 99.1  F (37.3  C)  Resp: 18  Weight: 70.2 kg (154 lb 12.8 oz)  SpO2: 91 %  Physical Exam  General:  No acute distress.  HENT:  Normocephalic and atraumatic.   Eyes: EOMI. Conjunctivae normal.   Cardiovascular: Normal rate and regular rhythm.  Normal heart sounds. No murmur heard.  Pulmonary:  No respiratory distress.  Scant end expiratory wheeze  Abdominal: There is no distension.  Abdomen is soft. There is no mass.  There is no abdominal tenderness.   Musculoskeletal: No swelling or tenderness.  Moving all extremities spontaneously.  No reproducible tenderness of left  lateral lower rib cage/flank  Skin: Warm and dry  Neurological: No focal deficit present.   Mood and Affect: Mood normal.    ED Course, Procedures, & Data      Procedures               No results found for any visits on 04/11/24.  Medications - No data to display  Labs Ordered and Resulted from Time of ED Arrival to Time of ED Departure - No data to display  No orders to display          Critical care was not performed.     Medical Decision Making  The patient's presentation was of high complexity (a chronic illness severe exacerbation, progression, or side effect of treatment).  The patient's evaluation involved:  review of external note(s) from 3+ sources (see separate area of note for details)  review of 3+ test result(s) ordered prior to this encounter (see separate area of note for details)  ordering and/or review of 3+ test(s) in this encounter (see separate area of note for details)  independent interpretation of testing performed by another health professional (see separate area of note for details)  discussion of management or test interpretation with another health professional (see separate area of note for details)    The patient's management necessitated moderate risk (prescription drug management including medications given in the ED) and further care after sign-out to Dr. Ring (see their note for further management).    Assessment & Plan    Patient presents emergency department for chief complaint of left lower rib cage/lateral trunk pain that began today.      In addition she does have a history of asthma and notes some recent wheezing, improved with albuterol inhaler at home prior to arrival.  On exam she is afebrile and in no acute distress.  Lungs are clear except for scant end expiratory wheeze.  Patient was provided a DuoNeb with relief of her symptoms.  She has been afebrile, and does not note any pulmonary infectious symptoms.  Due to complete resolution of her dyspnea, I do not suspect  "acute chest syndrome.    Will treat her pain according to her ED care plan.  She received Zofran, Dilaudid, Toradol, lactated ringer bolus.  Blood work reveals a leukocytosis of 13.1, hemoglobin of 7.3.  Both around her baseline.  Reticulocyte was unable to be run due to \"interfering substance.\"  This was redrawn.    On reevaluation she was still having pain and nausea and was given her second dose of Dilaudid and Zofran.  Patient care transferred out to oncoming physician to follow-up on additional pain management in the emergency department and reevaluation, and follow-up with her reticulocyte count.      I have reviewed the nursing notes. I have reviewed the findings, diagnosis, plan and need for follow up with the patient.    New Prescriptions    No medications on file       Final diagnoses:   None     [facility] EMERGENCY DEPARTMENT  4/11/2024     Jesica Michael MD  04/12/24 0114    "

## 2024-04-12 NOTE — TELEPHONE ENCOUNTER
"Endoscopy Scheduling Screen    Have you had a positive Covid test in the last 14 days?  No    What is your communication preference for Instructions and/or Bowel Prep?   MyChart    What insurance is in the chart?  Other:  [employer]    Ordering/Referring Provider:     Patricia Mantilla CNP in  ONCOLOGY ADULT      (If ordering provider performs procedure, schedule with ordering provider unless otherwise instructed. )    BMI: Estimated body mass index is 26.57 kg/m  as calculated from the following:    Height as of 3/17/24: 1.626 m (5' 4\").    Weight as of 4/11/24: 70.2 kg (154 lb 12.8 oz).     Sedation Ordered  MAC/deep sedation.   BMI<= 45 45 < BMI <= 48 48 < BMI < = 50  BMI > 50   No Restrictions No [facility]  No [facility]  [facility] with exceptions Hospital Only OR Only       Do you have a history of malignant hyperthermia?  No    (Females) Are you currently pregnant?   No     Have you been diagnosed or told you have pulmonary hypertension?   No    Do you have an LVAD?  No    Have you been told you have moderate to severe sleep apnea?  No    Have you been told you have COPD, asthma, or any other lung disease?  No    Do you have any heart conditions?  No     Have you ever had or are you waiting for an organ transplant?  No. Continue scheduling, no site restrictions.    Have you had a stroke or transient ischemic attack (TIA aka \"mini stroke\" in the last 6 months?   No    Have you been diagnosed with or been told you have cirrhosis of the liver?   No    Are you currently on dialysis?   No    Do you need assistance transferring?   No    BMI: Estimated body mass index is 26.57 kg/m  as calculated from the following:    Height as of 3/17/24: 1.626 m (5' 4\").    Weight as of 4/11/24: 70.2 kg (154 lb 12.8 oz).     Is patients BMI > 40 and scheduling location [facility]?  No    Do you take an injectable medication for weight loss or diabetes (excluding insulin)?  No    Do you take the medication Naltrexone?  No    Do you take " blood thinners?  No       Prep   Are you currently on dialysis or do you have chronic kidney disease?  No    Do you have a diagnosis of diabetes?  No    Do you have a diagnosis of cystic fibrosis (CF)?  No    On a regular basis do you go 3 -5 days between bowel movements?  No    BMI > 40?  No    Preferred Pharmacy:    [place], [place] - [address]  [address]  [facility] [address]  Phone: [phone] Fax: [phone] Alternate Fax: [phone], [phone]      Final Scheduling Details     Procedure scheduled  Upper endoscopy (EGD)    Surgeon:  John     Date of procedure:  05/09/2024     Pre-OP / PAC:   No - Not required for this site.    Location  [facility] - ASC - Patient preference.    Sedation   MAC/Deep Sedation - Per order.      Patient Reminders:   You will receive a call from a Nurse to review instructions and health history.  This assessment must be completed prior to your procedure.  Failure to complete the Nurse assessment may result in the procedure being cancelled.      On the day of your procedure, please designate an adult(s) who can drive you home stay with you for the next 24 hours. The medicines used in the exam will make you sleepy. You will not be able to drive.      You cannot take public transportation, ride share services, or non-medical taxi service without a responsible caregiver.  Medical transport services are allowed with the requirement that a responsible caregiver will receive you at your destination.  We require that drivers and caregivers are confirmed prior to your procedure.

## 2024-04-12 NOTE — ED NOTES
Patient was accepted at shift change signout the plan for me to follow-up with her after she got her pain protocol to assess how her pain was doing.  She states she feels much better, feels she is ready to discharge.  She is encouraged to continue her normal home meds, follow-up with her outpatient provider, return with any worsening or concerns.  She verbalizes understanding and is agreeable to the plan.    Dictation Disclaimer: Some of this Note has been completed with voice-recognition dictation software. Although errors are generally corrected real-time, there is the potential for a rare error to be present in the completed chart.       Court Ring MD  04/12/24 3130

## 2024-04-12 NOTE — DISCHARGE INSTRUCTIONS
Continue with your home medications. Follow up with your outpatient provider within a week. Return with any worsening or concerns.

## 2024-04-15 ENCOUNTER — INFUSION THERAPY VISIT (OUTPATIENT)
Dept: TRANSPLANT | Facility: CLINIC | Age: 24
End: 2024-04-15
Attending: PEDIATRICS
Payer: COMMERCIAL

## 2024-04-15 ENCOUNTER — NURSE TRIAGE (OUTPATIENT)
Dept: ONCOLOGY | Facility: CLINIC | Age: 24
End: 2024-04-15
Payer: COMMERCIAL

## 2024-04-15 ENCOUNTER — PATIENT OUTREACH (OUTPATIENT)
Dept: CARE COORDINATION | Facility: CLINIC | Age: 24
End: 2024-04-15
Payer: COMMERCIAL

## 2024-04-15 VITALS
SYSTOLIC BLOOD PRESSURE: 125 MMHG | OXYGEN SATURATION: 90 % | RESPIRATION RATE: 16 BRPM | HEART RATE: 95 BPM | TEMPERATURE: 98.5 F | DIASTOLIC BLOOD PRESSURE: 75 MMHG

## 2024-04-15 DIAGNOSIS — G81.10 SPASTIC HEMIPLEGIA, UNSPECIFIED ETIOLOGY, UNSPECIFIED LATERALITY (H): ICD-10-CM

## 2024-04-15 DIAGNOSIS — D57.00 SICKLE CELL PAIN CRISIS (H): Primary | ICD-10-CM

## 2024-04-15 PROCEDURE — 258N000003 HC RX IP 258 OP 636: Performed by: PEDIATRICS

## 2024-04-15 PROCEDURE — 250N000011 HC RX IP 250 OP 636: Performed by: PEDIATRICS

## 2024-04-15 PROCEDURE — 96361 HYDRATE IV INFUSION ADD-ON: CPT

## 2024-04-15 PROCEDURE — 96374 THER/PROPH/DIAG INJ IV PUSH: CPT

## 2024-04-15 PROCEDURE — 250N000013 HC RX MED GY IP 250 OP 250 PS 637: Performed by: PEDIATRICS

## 2024-04-15 PROCEDURE — 96376 TX/PRO/DX INJ SAME DRUG ADON: CPT

## 2024-04-15 PROCEDURE — 96375 TX/PRO/DX INJ NEW DRUG ADDON: CPT

## 2024-04-15 RX ORDER — KETOROLAC TROMETHAMINE 30 MG/ML
30 INJECTION, SOLUTION INTRAMUSCULAR; INTRAVENOUS ONCE
Status: COMPLETED | OUTPATIENT
Start: 2024-04-15 | End: 2024-04-15

## 2024-04-15 RX ORDER — HEPARIN SODIUM (PORCINE) LOCK FLUSH IV SOLN 100 UNIT/ML 100 UNIT/ML
5 SOLUTION INTRAVENOUS
Status: CANCELLED | OUTPATIENT
Start: 2024-07-01

## 2024-04-15 RX ORDER — ONDANSETRON 2 MG/ML
8 INJECTION INTRAMUSCULAR; INTRAVENOUS EVERY 6 HOURS PRN
Status: DISCONTINUED | OUTPATIENT
Start: 2024-04-15 | End: 2024-04-15 | Stop reason: HOSPADM

## 2024-04-15 RX ORDER — DIPHENHYDRAMINE HCL 25 MG
50 CAPSULE ORAL
Status: COMPLETED | OUTPATIENT
Start: 2024-04-15 | End: 2024-04-15

## 2024-04-15 RX ORDER — HEPARIN SODIUM,PORCINE 10 UNIT/ML
5 VIAL (ML) INTRAVENOUS
Status: CANCELLED | OUTPATIENT
Start: 2024-07-01

## 2024-04-15 RX ORDER — ONDANSETRON 2 MG/ML
8 INJECTION INTRAMUSCULAR; INTRAVENOUS EVERY 6 HOURS PRN
Status: CANCELLED
Start: 2024-07-01

## 2024-04-15 RX ORDER — HEPARIN SODIUM (PORCINE) LOCK FLUSH IV SOLN 100 UNIT/ML 100 UNIT/ML
5 SOLUTION INTRAVENOUS
Status: DISCONTINUED | OUTPATIENT
Start: 2024-04-15 | End: 2024-04-15 | Stop reason: HOSPADM

## 2024-04-15 RX ORDER — DIPHENHYDRAMINE HCL 25 MG
50 CAPSULE ORAL
Status: CANCELLED
Start: 2024-07-01

## 2024-04-15 RX ORDER — ONDANSETRON 4 MG/1
8 TABLET, FILM COATED ORAL
Status: CANCELLED
Start: 2024-07-01

## 2024-04-15 RX ORDER — KETOROLAC TROMETHAMINE 30 MG/ML
30 INJECTION, SOLUTION INTRAMUSCULAR; INTRAVENOUS ONCE
Status: CANCELLED
Start: 2024-07-01 | End: 2024-07-01

## 2024-04-15 RX ADMIN — DIPHENHYDRAMINE HYDROCHLORIDE 50 MG: 25 CAPSULE ORAL at 13:09

## 2024-04-15 RX ADMIN — HYDROMORPHONE HYDROCHLORIDE 1 MG: 1 INJECTION, SOLUTION INTRAMUSCULAR; INTRAVENOUS; SUBCUTANEOUS at 14:09

## 2024-04-15 RX ADMIN — HYDROMORPHONE HYDROCHLORIDE 1 MG: 1 INJECTION, SOLUTION INTRAMUSCULAR; INTRAVENOUS; SUBCUTANEOUS at 15:11

## 2024-04-15 RX ADMIN — HYDROMORPHONE HYDROCHLORIDE 1 MG: 1 INJECTION, SOLUTION INTRAMUSCULAR; INTRAVENOUS; SUBCUTANEOUS at 13:12

## 2024-04-15 RX ADMIN — SODIUM CHLORIDE, POTASSIUM CHLORIDE, SODIUM LACTATE AND CALCIUM CHLORIDE 1000 ML: 600; 310; 30; 20 INJECTION, SOLUTION INTRAVENOUS at 13:10

## 2024-04-15 RX ADMIN — Medication 5 ML: at 15:42

## 2024-04-15 RX ADMIN — KETOROLAC TROMETHAMINE 30 MG: 30 INJECTION, SOLUTION INTRAMUSCULAR; INTRAVENOUS at 13:30

## 2024-04-15 RX ADMIN — ONDANSETRON 8 MG: 2 INJECTION INTRAMUSCULAR; INTRAVENOUS at 13:11

## 2024-04-15 ASSESSMENT — PAIN SCALES - GENERAL: PAINLEVEL: EXTREME PAIN (9)

## 2024-04-15 NOTE — PROGRESS NOTES
Infusion Nursing Note:  Jennifer Cervantes presents today for add on IVF/pain medication.    Patient seen by provider today: No   present during visit today: Not Applicable.    Note: Jennifer arrived for add on IVF/pain due to worsening pain to right leg and left arm. Patient rates pain 9/10 today and states pain has been intermittent over the past couple days. Jennifer is also concerned but increased SOB with exertion which is requiring her to rest while completing ADL's. She has been using her nebulizer and inhalers at home as ordered. VORB @ 9807 -- Patricia Mantilla PA-C/Jennifer Lai RN -- OK for patient to discharge home if oxygen sat's remain above 90% on RA. Denies chest pain, N/V, fever. ONC toxicity assessment completed. Home medications and allergies reviewed. Received 1L LR over 2 hours, Zofran 8 mg IVP, benadryl 50 mg PO, Toradol 30 mg IVP, and dilaudid 1 mg IVP every 60 minutes x 3 doses.     Intravenous Access:  Implanted Port.    Treatment Conditions:  Not Applicable.      Post Infusion Assessment:  Patient tolerated infusion without incident.  Blood return noted pre and post infusion.  Site patent and intact, free from redness, edema or discomfort.  Access discontinued per protocol.       Discharge Plan:   Patient discharged in stable condition accompanied by: self.  Departure Mode: Ambulatory.      Jennifer Lai RN

## 2024-04-15 NOTE — TELEPHONE ENCOUNTER
Is unable to come in for 8:30 appt. As she states she has a personal issue.     Wants to be added to list again but will need help with transportation.     Infusion nurses and social workers updated.

## 2024-04-15 NOTE — TELEPHONE ENCOUNTER
BMT can take at 1:00pm.     Call placed to Jennifer and that appt will work out for her.     Message sent to Social workers to arrange transportation.     Message sent to [facility] to schedule

## 2024-04-15 NOTE — TELEPHONE ENCOUNTER
[facility] can take 8:30     She will get own ride in and will need assistance with ride home     Message sent to Social workers about transport home.     Message sent to [facility] to schedule.

## 2024-04-15 NOTE — PROGRESS NOTES
Social Work - Transportation  [facility]    Data/Intervention:  Patient Name: Jennifer Cervantes Goes By: Jennifer    /Age: 2000 (24 year old)    Referral From: Ange Abbasi RN  Reason for Referral:  support requested for patient transportation needs for today's appointment.  Assessment:  called [employer] to arrange ride through patient's insurance. [employer] arranged  for patient from home with [employer]. Patient will need to call [phone] when ready for return ride home.  Plan: Patient is aware of the transportation plan.  available to assist with any other needs.    CARLOS Chavez,LGUDAY  Hematology/Oncology Social Worker  Phone:[phone] Pager: [phone]

## 2024-04-15 NOTE — TELEPHONE ENCOUNTER
Oncology Nurse Triage - Sickle Cell Pain Crisis:    Situation: Jennifer  calling about Sickle Cell Pain Crisis, requesting to be added on for IV fluids and pain medicine    Background:     Patient's last infusion was 4/10/24   Last clinic visit date:4/8/24 Patricia Mantilla   Does patient have active treatment plan?  Yes      Assessment of Symptoms:  Onset/Duration of symptoms: 1 day    Is it typical sickle cell pain? Yes   Location: right side leg, lower back and arms   Character: Sharp           Intensity: 8/10    Any radiation of pain, numbness, tingling, weakness, warmth, swelling, discoloration of arms or legs?No     Fever?No  (if yes max temperature recorded in last 24 hours):      Chest Pain Present: No     Shortness of breath: No     Other home therapies tried: HEAT/HEATING PAD and WARM BATH     Last home medication taken and when: ibuprofen and oxycodone at 6am     Any Refills Needed?: No     Does patient have transportation & length of time to get to clinic: No if has early appt ride can get own ride in if later in the day then will need help with transportation         Recommendations:     Placed on infusion call list     If you do not hear from the infusion center by 2pm then you will not be able to get in for an infusion today. If symptoms worsen while waiting for call back, and/or you experience fever, chills, SOB, chest pain, cough, n/v, dizziness, numbness, swelling, discoloration of extremities, then seek emergency evaluation in Emergency Department.     Please note, if you are late for your appt, you risk losing your infusion appt as it may delay another patient's infusion who arrived on time.

## 2024-04-17 ENCOUNTER — NURSE TRIAGE (OUTPATIENT)
Dept: ONCOLOGY | Facility: CLINIC | Age: 24
End: 2024-04-17
Payer: COMMERCIAL

## 2024-04-17 DIAGNOSIS — D57.00 SICKLE CELL PAIN CRISIS (H): ICD-10-CM

## 2024-04-17 RX ORDER — OXYCODONE HYDROCHLORIDE 10 MG/1
10 TABLET ORAL EVERY 4 HOURS PRN
Qty: 20 TABLET | Refills: 0 | Status: ON HOLD | OUTPATIENT
Start: 2024-04-19 | End: 2024-04-29

## 2024-04-17 NOTE — TELEPHONE ENCOUNTER
Narcotic Refill Request    Medication(s) requested:  Oxycodone 10 mg  Person Requesting Refill: patient  What pain is the medication treating: Sickle Cell pain  How is the medication being taken?:1 tablet every 6 hrs  Does pt have enough for today? Yes  Is pain being adequately controlled on the current regimen?: Yes  Experiencing any side effects from medication?: No    Date of most recent appointment:  04/08/24 aida/Patricia Mantilla  Any No Show Visits: No  Next appointment:   04/26/24 Karl Mantilla  Last fill date and by whom:  04/10/24 by Patricia Mantilla   Reviewed: No access    Routed provider: Patricia Mantilla

## 2024-04-17 NOTE — TELEPHONE ENCOUNTER
Oncology Nurse Triage - Sickle Cell Pain Crisis:    Situation: Jennifer  calling about Sickle Cell Pain Crisis, requesting to be added on for IV fluids and pain medicine    Background:     Patient's last infusion was 04/15/24   Last clinic visit date:04/08/24 w/Patricia Mantilla  Does patient have active treatment plan?  Yes      Assessment of Symptoms:  Onset/Duration of symptoms: 1 day    Is it typical sickle cell pain? Yes   Location: Left side  Character: Sharp           Intensity: 8/10    Any radiation of pain, numbness, tingling, weakness, warmth, swelling, discoloration of arms or legs?No     Fever?No  (if yes max temperature recorded in last 24 hours):      Chest Pain Present: No     Shortness of breath: No     Other home therapies tried: HEAT/HEATING PAD and WARM BATH     Last home medication taken and when: 0600 oxycodone, ibuprofen    Any Refills Needed?: Yes, oxycodone 10 mg    Does patient have transportation & length of time to get to clinic: No         Recommendations:   Added to infusion wait list for IVF/pain meds per protocol.    If you do not hear from the infusion center by 2pm then you will not be able to get in for an infusion today. If symptoms worsen while waiting for call back, and/or you experience fever, chills, SOB, chest pain, cough, n/v, dizziness, numbness, swelling, discoloration of extremities, then seek emergency evaluation in Emergency Department.     Please note, if you are late for your appt, you risk losing your infusion appt as it may delay another patient's infusion who arrived on time.

## 2024-04-18 ENCOUNTER — PATIENT OUTREACH (OUTPATIENT)
Dept: CARE COORDINATION | Facility: CLINIC | Age: 24
End: 2024-04-18
Payer: COMMERCIAL

## 2024-04-18 ENCOUNTER — NURSE TRIAGE (OUTPATIENT)
Dept: ONCOLOGY | Facility: CLINIC | Age: 24
End: 2024-04-18
Payer: COMMERCIAL

## 2024-04-18 ENCOUNTER — INFUSION THERAPY VISIT (OUTPATIENT)
Dept: TRANSPLANT | Facility: CLINIC | Age: 24
End: 2024-04-18
Attending: PEDIATRICS
Payer: COMMERCIAL

## 2024-04-18 VITALS
RESPIRATION RATE: 16 BRPM | WEIGHT: 151.01 LBS | SYSTOLIC BLOOD PRESSURE: 115 MMHG | TEMPERATURE: 98.1 F | OXYGEN SATURATION: 92 % | BODY MASS INDEX: 25.92 KG/M2 | DIASTOLIC BLOOD PRESSURE: 76 MMHG | HEART RATE: 82 BPM

## 2024-04-18 DIAGNOSIS — G81.10 SPASTIC HEMIPLEGIA, UNSPECIFIED ETIOLOGY, UNSPECIFIED LATERALITY (H): ICD-10-CM

## 2024-04-18 DIAGNOSIS — D57.00 SICKLE CELL PAIN CRISIS (H): Primary | ICD-10-CM

## 2024-04-18 PROCEDURE — 96375 TX/PRO/DX INJ NEW DRUG ADDON: CPT

## 2024-04-18 PROCEDURE — 96361 HYDRATE IV INFUSION ADD-ON: CPT

## 2024-04-18 PROCEDURE — 250N000013 HC RX MED GY IP 250 OP 250 PS 637: Performed by: PEDIATRICS

## 2024-04-18 PROCEDURE — 250N000011 HC RX IP 250 OP 636: Performed by: PEDIATRICS

## 2024-04-18 PROCEDURE — 258N000003 HC RX IP 258 OP 636: Performed by: PEDIATRICS

## 2024-04-18 PROCEDURE — 96374 THER/PROPH/DIAG INJ IV PUSH: CPT

## 2024-04-18 PROCEDURE — 96376 TX/PRO/DX INJ SAME DRUG ADON: CPT

## 2024-04-18 RX ORDER — ONDANSETRON 4 MG/1
8 TABLET, FILM COATED ORAL
Status: CANCELLED
Start: 2024-07-01

## 2024-04-18 RX ORDER — DIPHENHYDRAMINE HCL 25 MG
50 CAPSULE ORAL
Status: COMPLETED | OUTPATIENT
Start: 2024-04-18 | End: 2024-04-18

## 2024-04-18 RX ORDER — KETOROLAC TROMETHAMINE 30 MG/ML
30 INJECTION, SOLUTION INTRAMUSCULAR; INTRAVENOUS ONCE
Status: CANCELLED
Start: 2024-07-01 | End: 2024-07-01

## 2024-04-18 RX ORDER — EPINEPHRINE 1 MG/ML
0.3 INJECTION, SOLUTION, CONCENTRATE INTRAVENOUS EVERY 5 MIN PRN
Status: CANCELLED | OUTPATIENT
Start: 2024-04-18

## 2024-04-18 RX ORDER — KETOROLAC TROMETHAMINE 30 MG/ML
30 INJECTION, SOLUTION INTRAMUSCULAR; INTRAVENOUS ONCE
Status: COMPLETED | OUTPATIENT
Start: 2024-04-18 | End: 2024-04-18

## 2024-04-18 RX ORDER — DIPHENHYDRAMINE HYDROCHLORIDE 50 MG/ML
50 INJECTION INTRAMUSCULAR; INTRAVENOUS
Status: CANCELLED
Start: 2024-04-19

## 2024-04-18 RX ORDER — DIPHENHYDRAMINE HYDROCHLORIDE 50 MG/ML
50 INJECTION INTRAMUSCULAR; INTRAVENOUS
Status: CANCELLED
Start: 2024-04-18

## 2024-04-18 RX ORDER — HEPARIN SODIUM,PORCINE 10 UNIT/ML
5 VIAL (ML) INTRAVENOUS
Status: CANCELLED | OUTPATIENT
Start: 2024-04-18

## 2024-04-18 RX ORDER — ALBUTEROL SULFATE 90 UG/1
1-2 AEROSOL, METERED RESPIRATORY (INHALATION)
Status: CANCELLED
Start: 2024-04-19

## 2024-04-18 RX ORDER — ONDANSETRON 2 MG/ML
8 INJECTION INTRAMUSCULAR; INTRAVENOUS EVERY 6 HOURS PRN
Status: CANCELLED
Start: 2024-07-01

## 2024-04-18 RX ORDER — ALBUTEROL SULFATE 0.83 MG/ML
2.5 SOLUTION RESPIRATORY (INHALATION)
Status: CANCELLED | OUTPATIENT
Start: 2024-04-19

## 2024-04-18 RX ORDER — HEPARIN SODIUM,PORCINE 10 UNIT/ML
5 VIAL (ML) INTRAVENOUS
Status: CANCELLED | OUTPATIENT
Start: 2024-04-19

## 2024-04-18 RX ORDER — HEPARIN SODIUM (PORCINE) LOCK FLUSH IV SOLN 100 UNIT/ML 100 UNIT/ML
5 SOLUTION INTRAVENOUS
Status: DISCONTINUED | OUTPATIENT
Start: 2024-04-18 | End: 2024-04-18 | Stop reason: HOSPADM

## 2024-04-18 RX ORDER — HEPARIN SODIUM (PORCINE) LOCK FLUSH IV SOLN 100 UNIT/ML 100 UNIT/ML
5 SOLUTION INTRAVENOUS
Status: CANCELLED | OUTPATIENT
Start: 2024-07-01

## 2024-04-18 RX ORDER — METHYLPREDNISOLONE SODIUM SUCCINATE 125 MG/2ML
125 INJECTION, POWDER, LYOPHILIZED, FOR SOLUTION INTRAMUSCULAR; INTRAVENOUS
Status: CANCELLED
Start: 2024-04-19

## 2024-04-18 RX ORDER — HEPARIN SODIUM (PORCINE) LOCK FLUSH IV SOLN 100 UNIT/ML 100 UNIT/ML
5 SOLUTION INTRAVENOUS
Status: CANCELLED | OUTPATIENT
Start: 2024-04-18

## 2024-04-18 RX ORDER — EPINEPHRINE 1 MG/ML
0.3 INJECTION, SOLUTION, CONCENTRATE INTRAVENOUS EVERY 5 MIN PRN
Status: CANCELLED | OUTPATIENT
Start: 2024-04-19

## 2024-04-18 RX ORDER — HEPARIN SODIUM,PORCINE 10 UNIT/ML
5 VIAL (ML) INTRAVENOUS
Status: CANCELLED | OUTPATIENT
Start: 2024-07-01

## 2024-04-18 RX ORDER — MEPERIDINE HYDROCHLORIDE 25 MG/ML
25 INJECTION INTRAMUSCULAR; INTRAVENOUS; SUBCUTANEOUS EVERY 30 MIN PRN
Status: CANCELLED | OUTPATIENT
Start: 2024-04-19

## 2024-04-18 RX ORDER — ALBUTEROL SULFATE 90 UG/1
1-2 AEROSOL, METERED RESPIRATORY (INHALATION)
Status: CANCELLED
Start: 2024-04-18

## 2024-04-18 RX ORDER — MEPERIDINE HYDROCHLORIDE 25 MG/ML
25 INJECTION INTRAMUSCULAR; INTRAVENOUS; SUBCUTANEOUS EVERY 30 MIN PRN
Status: CANCELLED | OUTPATIENT
Start: 2024-04-18

## 2024-04-18 RX ORDER — ONDANSETRON 2 MG/ML
8 INJECTION INTRAMUSCULAR; INTRAVENOUS EVERY 6 HOURS PRN
Status: DISCONTINUED | OUTPATIENT
Start: 2024-04-18 | End: 2024-04-18 | Stop reason: HOSPADM

## 2024-04-18 RX ORDER — DIPHENHYDRAMINE HCL 25 MG
50 CAPSULE ORAL
Status: CANCELLED
Start: 2024-07-01

## 2024-04-18 RX ORDER — HEPARIN SODIUM (PORCINE) LOCK FLUSH IV SOLN 100 UNIT/ML 100 UNIT/ML
5 SOLUTION INTRAVENOUS
Status: CANCELLED | OUTPATIENT
Start: 2024-04-19

## 2024-04-18 RX ORDER — ALBUTEROL SULFATE 0.83 MG/ML
2.5 SOLUTION RESPIRATORY (INHALATION)
Status: CANCELLED | OUTPATIENT
Start: 2024-04-18

## 2024-04-18 RX ORDER — METHYLPREDNISOLONE SODIUM SUCCINATE 125 MG/2ML
125 INJECTION, POWDER, LYOPHILIZED, FOR SOLUTION INTRAMUSCULAR; INTRAVENOUS
Status: CANCELLED
Start: 2024-04-18

## 2024-04-18 RX ADMIN — KETOROLAC TROMETHAMINE 30 MG: 30 INJECTION, SOLUTION INTRAMUSCULAR; INTRAVENOUS at 12:23

## 2024-04-18 RX ADMIN — DIPHENHYDRAMINE HYDROCHLORIDE 50 MG: 25 CAPSULE ORAL at 12:11

## 2024-04-18 RX ADMIN — Medication 5 ML: at 14:26

## 2024-04-18 RX ADMIN — SODIUM CHLORIDE, POTASSIUM CHLORIDE, SODIUM LACTATE AND CALCIUM CHLORIDE 1000 ML: 600; 310; 30; 20 INJECTION, SOLUTION INTRAVENOUS at 12:12

## 2024-04-18 RX ADMIN — ONDANSETRON 8 MG: 2 INJECTION INTRAMUSCULAR; INTRAVENOUS at 12:15

## 2024-04-18 RX ADMIN — HYDROMORPHONE HYDROCHLORIDE 1 MG: 1 INJECTION, SOLUTION INTRAMUSCULAR; INTRAVENOUS; SUBCUTANEOUS at 14:20

## 2024-04-18 RX ADMIN — HYDROMORPHONE HYDROCHLORIDE 1 MG: 1 INJECTION, SOLUTION INTRAMUSCULAR; INTRAVENOUS; SUBCUTANEOUS at 13:23

## 2024-04-18 RX ADMIN — HYDROMORPHONE HYDROCHLORIDE 1 MG: 1 INJECTION, SOLUTION INTRAMUSCULAR; INTRAVENOUS; SUBCUTANEOUS at 12:20

## 2024-04-18 ASSESSMENT — PAIN SCALES - GENERAL: PAINLEVEL: EXTREME PAIN (8)

## 2024-04-18 NOTE — NURSING NOTE
Chief Complaint   Patient presents with    Infusion     Pt here for IV Fluids, IV Zofran, IV Dilaudid, and IV Toradol. Pt with Sickle Cell Disease.      Farhana Rust RN

## 2024-04-18 NOTE — TELEPHONE ENCOUNTER
Oncology Nurse Triage - Sickle Cell Pain Crisis:    Situation: Jennifer  calling about Sickle Cell Pain Crisis, requesting to be added on for IV fluids and pain medicine    Background:     Patient's last infusion was 4/15/24   Last clinic visit date:4/8/24 Patricia Mantilla   Does patient have active treatment plan?  Yes      Assessment of Symptoms:  Onset/Duration of symptoms: 2 day    Is it typical sickle cell pain? Yes   Location: legs   Character: Sharp           Intensity: 8/10    Any radiation of pain, numbness, tingling, weakness, warmth, swelling, discoloration of arms or legs?No     Fever?No  (if yes max temperature recorded in last 24 hours):      Chest Pain Present: No     Shortness of breath: No     Other home therapies tried: HEAT/HEATING PAD and WARM BATH     Last home medication taken and when: 6am oxy and ibuprofen     Any Refills Needed?: No     Does patient have transportation & length of time to get to clinic: No needs transporation to and from clinic         Recommendations:     Placed on infusion call list     If you do not hear from the infusion center by 2pm then you will not be able to get in for an infusion today. If symptoms worsen while waiting for call back, and/or you experience fever, chills, SOB, chest pain, cough, n/v, dizziness, numbness, swelling, discoloration of extremities, then seek emergency evaluation in Emergency Department.     Please note, if you are late for your appt, you risk losing your infusion appt as it may delay another patient's infusion who arrived on time.

## 2024-04-18 NOTE — PROGRESS NOTES
Social Work - Transportation  [facility]    Data/Intervention:  Patient Name: Jenniefr Cervantes Goes By: Jennifer    /Age: 2000 (24 year old)    [facility]  Reason for Referral:  support requested for patient transportation needs for today's appointment.  Assessment:  called [employer] to arrange ride through patient's insurance. [employer] arranged  for patient from home with [employer]. Patient will need to call [phone] when ready for return ride home.  Plan: Patient is aware of the transportation plan.  available to assist with any other needs.    CARLOS Chavez,[facility]  Hematology/Oncology Social Worker  Phone:[phone] Pager: [phone]

## 2024-04-18 NOTE — TELEPHONE ENCOUNTER
BMT can take at 12pm     Call placed to Jennifer and she can make it to that appt.     Message sent to SW to arrange transportation     Message sent to [facility]OD to schedule

## 2024-04-18 NOTE — PROGRESS NOTES
"Infusion Nursing Note:  Jennifer Cervantes presents today for IV Fluids, and IV Zofran/Dilaudid/Toradol. See MAR.     Patient seen by provider today: No   present during visit today: Not Applicable.    Note: Pt presents today rating the Pain in her Legs and Hips an \"8/10\". Pt received LR 1L IV over 2 hours, as well as IV Zofran, IV Toradol, and 3 doses of IV Dilaudid (each 1 hour apart) as part of Sickle Cell pain crisis plan. Pt reports improvement in her pain prior to discharge- rating it a \"6/10\". Denies any SOB at today's visit. Sating 92% on RA. A&O x 3.       Intravenous Access:  Implanted Port.    Treatment Conditions:  Not Applicable.      Post Infusion Assessment:  Patient tolerated infusion without incident.  Blood return noted pre and post infusion.  Access discontinued per protocol.       Discharge Plan:   Copy of AVS reviewed with patient and/or family.  Patient will return tomorrow for next appointment/infusion.  Patient discharged in stable condition accompanied by: self.  Departure Mode: Ambulatory.      Farhana Rust RN    "

## 2024-04-19 ENCOUNTER — INFUSION THERAPY VISIT (OUTPATIENT)
Dept: ONCOLOGY | Facility: CLINIC | Age: 24
End: 2024-04-19
Attending: PEDIATRICS
Payer: COMMERCIAL

## 2024-04-19 ENCOUNTER — APPOINTMENT (OUTPATIENT)
Dept: LAB | Facility: CLINIC | Age: 24
End: 2024-04-19
Attending: PEDIATRICS
Payer: COMMERCIAL

## 2024-04-19 VITALS
SYSTOLIC BLOOD PRESSURE: 109 MMHG | RESPIRATION RATE: 16 BRPM | BODY MASS INDEX: 26.23 KG/M2 | WEIGHT: 152.8 LBS | TEMPERATURE: 98.6 F | OXYGEN SATURATION: 90 % | HEART RATE: 88 BPM | DIASTOLIC BLOOD PRESSURE: 70 MMHG

## 2024-04-19 DIAGNOSIS — F41.9 ANXIETY: ICD-10-CM

## 2024-04-19 DIAGNOSIS — D57.00 SICKLE CELL PAIN CRISIS (H): Primary | ICD-10-CM

## 2024-04-19 DIAGNOSIS — G81.10 SPASTIC HEMIPLEGIA, UNSPECIFIED ETIOLOGY, UNSPECIFIED LATERALITY (H): ICD-10-CM

## 2024-04-19 DIAGNOSIS — D57.1 HB-SS DISEASE WITHOUT CRISIS (H): Primary | ICD-10-CM

## 2024-04-19 LAB
ACANTHOCYTES BLD QL SMEAR: NORMAL
ANION GAP SERPL CALCULATED.3IONS-SCNC: 10 MMOL/L (ref 7–15)
AUER BODIES BLD QL SMEAR: NORMAL
BASO STIPL BLD QL SMEAR: NORMAL
BASOPHILS # BLD AUTO: 0.2 10E3/UL (ref 0–0.2)
BASOPHILS NFR BLD AUTO: 2 %
BITE CELLS BLD QL SMEAR: NORMAL
BLISTER CELLS BLD QL SMEAR: NORMAL
BUN SERPL-MCNC: 5.1 MG/DL (ref 6–20)
BURR CELLS BLD QL SMEAR: NORMAL
CALCIUM SERPL-MCNC: 8.5 MG/DL (ref 8.6–10)
CHLORIDE SERPL-SCNC: 107 MMOL/L (ref 98–107)
CREAT SERPL-MCNC: 0.57 MG/DL (ref 0.51–0.95)
DACRYOCYTES BLD QL SMEAR: NORMAL
DEPRECATED HCO3 PLAS-SCNC: 23 MMOL/L (ref 22–29)
EGFRCR SERPLBLD CKD-EPI 2021: >90 ML/MIN/1.73M2
ELLIPTOCYTES BLD QL SMEAR: NORMAL
EOSINOPHIL # BLD AUTO: 0.6 10E3/UL (ref 0–0.7)
EOSINOPHIL NFR BLD AUTO: 7 %
ERYTHROCYTE [DISTWIDTH] IN BLOOD BY AUTOMATED COUNT: 23.7 % (ref 10–15)
FRAGMENTS BLD QL SMEAR: NORMAL
GLUCOSE SERPL-MCNC: 97 MG/DL (ref 70–99)
HCT VFR BLD AUTO: 20.1 % (ref 35–47)
HGB BLD-MCNC: 7.4 G/DL (ref 11.7–15.7)
HGB C CRYSTALS: NORMAL
HOWELL-JOLLY BOD BLD QL SMEAR: NORMAL
IMM GRANULOCYTES # BLD: 0 10E3/UL
IMM GRANULOCYTES NFR BLD: 1 %
LYMPHOCYTES # BLD AUTO: 1.4 10E3/UL (ref 0.8–5.3)
LYMPHOCYTES NFR BLD AUTO: 18 %
MCH RBC QN AUTO: 31.8 PG (ref 26.5–33)
MCHC RBC AUTO-ENTMCNC: 36.8 G/DL (ref 31.5–36.5)
MCV RBC AUTO: 86 FL (ref 78–100)
MONOCYTES # BLD AUTO: 0.8 10E3/UL (ref 0–1.3)
MONOCYTES NFR BLD AUTO: 10 %
NEUTROPHILS # BLD AUTO: 5.2 10E3/UL (ref 1.6–8.3)
NEUTROPHILS NFR BLD AUTO: 62 %
NEUTS HYPERSEG BLD QL SMEAR: NORMAL
NRBC # BLD AUTO: 0.4 10E3/UL
NRBC BLD AUTO-RTO: 5 /100
PLAT MORPH BLD: NORMAL
PLATELET # BLD AUTO: 427 10E3/UL (ref 150–450)
POLYCHROMASIA BLD QL SMEAR: NORMAL
POTASSIUM SERPL-SCNC: 4 MMOL/L (ref 3.4–5.3)
RBC # BLD AUTO: 2.33 10E6/UL (ref 3.8–5.2)
RBC AGGLUT BLD QL: NORMAL
RBC MORPH BLD: NORMAL
RETICS # AUTO: 0.65 10E6/UL (ref 0.03–0.1)
RETICS/RBC NFR AUTO: 29.4 % (ref 0.5–2)
ROULEAUX BLD QL SMEAR: NORMAL
SICKLE CELLS BLD QL SMEAR: NORMAL
SMUDGE CELLS BLD QL SMEAR: NORMAL
SODIUM SERPL-SCNC: 140 MMOL/L (ref 135–145)
SPHEROCYTES BLD QL SMEAR: NORMAL
STOMATOCYTES BLD QL SMEAR: NORMAL
TARGETS BLD QL SMEAR: NORMAL
TOXIC GRANULES BLD QL SMEAR: NORMAL
VARIANT LYMPHS BLD QL SMEAR: NORMAL
WBC # BLD AUTO: 8.1 10E3/UL (ref 4–11)

## 2024-04-19 PROCEDURE — 85025 COMPLETE CBC W/AUTO DIFF WBC: CPT | Performed by: PEDIATRICS

## 2024-04-19 PROCEDURE — 96413 CHEMO IV INFUSION 1 HR: CPT

## 2024-04-19 PROCEDURE — 82565 ASSAY OF CREATININE: CPT | Performed by: PEDIATRICS

## 2024-04-19 PROCEDURE — 85045 AUTOMATED RETICULOCYTE COUNT: CPT | Performed by: PEDIATRICS

## 2024-04-19 PROCEDURE — 258N000003 HC RX IP 258 OP 636: Performed by: PEDIATRICS

## 2024-04-19 PROCEDURE — 96361 HYDRATE IV INFUSION ADD-ON: CPT

## 2024-04-19 PROCEDURE — 96376 TX/PRO/DX INJ SAME DRUG ADON: CPT

## 2024-04-19 PROCEDURE — 36591 DRAW BLOOD OFF VENOUS DEVICE: CPT | Performed by: PEDIATRICS

## 2024-04-19 PROCEDURE — 96375 TX/PRO/DX INJ NEW DRUG ADDON: CPT

## 2024-04-19 PROCEDURE — 250N000011 HC RX IP 250 OP 636: Performed by: PEDIATRICS

## 2024-04-19 PROCEDURE — 82374 ASSAY BLOOD CARBON DIOXIDE: CPT | Performed by: PEDIATRICS

## 2024-04-19 PROCEDURE — 96365 THER/PROPH/DIAG IV INF INIT: CPT

## 2024-04-19 RX ORDER — HEPARIN SODIUM (PORCINE) LOCK FLUSH IV SOLN 100 UNIT/ML 100 UNIT/ML
5 SOLUTION INTRAVENOUS
Status: CANCELLED | OUTPATIENT
Start: 2024-07-01

## 2024-04-19 RX ORDER — ONDANSETRON 4 MG/1
8 TABLET, FILM COATED ORAL
Status: CANCELLED
Start: 2024-07-01

## 2024-04-19 RX ORDER — HEPARIN SODIUM (PORCINE) LOCK FLUSH IV SOLN 100 UNIT/ML 100 UNIT/ML
5 SOLUTION INTRAVENOUS
Status: DISCONTINUED | OUTPATIENT
Start: 2024-04-19 | End: 2024-04-19 | Stop reason: HOSPADM

## 2024-04-19 RX ORDER — ONDANSETRON 2 MG/ML
8 INJECTION INTRAMUSCULAR; INTRAVENOUS EVERY 6 HOURS PRN
Status: CANCELLED
Start: 2024-07-01

## 2024-04-19 RX ORDER — DIPHENHYDRAMINE HCL 25 MG
50 CAPSULE ORAL
Status: CANCELLED
Start: 2024-07-01

## 2024-04-19 RX ORDER — KETOROLAC TROMETHAMINE 30 MG/ML
30 INJECTION, SOLUTION INTRAMUSCULAR; INTRAVENOUS ONCE
Status: COMPLETED | OUTPATIENT
Start: 2024-04-19 | End: 2024-04-19

## 2024-04-19 RX ORDER — KETOROLAC TROMETHAMINE 30 MG/ML
30 INJECTION, SOLUTION INTRAMUSCULAR; INTRAVENOUS ONCE
Status: CANCELLED
Start: 2024-07-01 | End: 2024-07-01

## 2024-04-19 RX ORDER — ONDANSETRON 2 MG/ML
8 INJECTION INTRAMUSCULAR; INTRAVENOUS EVERY 6 HOURS PRN
Status: DISCONTINUED | OUTPATIENT
Start: 2024-04-19 | End: 2024-04-19 | Stop reason: HOSPADM

## 2024-04-19 RX ORDER — HEPARIN SODIUM,PORCINE 10 UNIT/ML
5 VIAL (ML) INTRAVENOUS
Status: CANCELLED | OUTPATIENT
Start: 2024-07-01

## 2024-04-19 RX ADMIN — HYDROMORPHONE HYDROCHLORIDE 1 MG: 1 INJECTION, SOLUTION INTRAMUSCULAR; INTRAVENOUS; SUBCUTANEOUS at 13:59

## 2024-04-19 RX ADMIN — KETOROLAC TROMETHAMINE 30 MG: 30 INJECTION, SOLUTION INTRAMUSCULAR; INTRAVENOUS at 13:51

## 2024-04-19 RX ADMIN — Medication 5 ML: at 11:03

## 2024-04-19 RX ADMIN — Medication 5 ML: at 15:23

## 2024-04-19 RX ADMIN — ONDANSETRON 8 MG: 2 INJECTION INTRAMUSCULAR; INTRAVENOUS at 13:55

## 2024-04-19 RX ADMIN — SODIUM CHLORIDE, POTASSIUM CHLORIDE, SODIUM LACTATE AND CALCIUM CHLORIDE 1000 ML: 600; 310; 30; 20 INJECTION, SOLUTION INTRAVENOUS at 13:18

## 2024-04-19 RX ADMIN — SODIUM CHLORIDE 346.5 MG: 9 INJECTION, SOLUTION INTRAVENOUS at 11:59

## 2024-04-19 RX ADMIN — HYDROMORPHONE HYDROCHLORIDE 1 MG: 1 INJECTION, SOLUTION INTRAMUSCULAR; INTRAVENOUS; SUBCUTANEOUS at 12:34

## 2024-04-19 RX ADMIN — HYDROMORPHONE HYDROCHLORIDE 1 MG: 1 INJECTION, SOLUTION INTRAMUSCULAR; INTRAVENOUS; SUBCUTANEOUS at 15:05

## 2024-04-19 RX ADMIN — SODIUM CHLORIDE 250 ML: 9 INJECTION, SOLUTION INTRAVENOUS at 11:55

## 2024-04-19 ASSESSMENT — PAIN SCALES - GENERAL: PAINLEVEL: EXTREME PAIN (8)

## 2024-04-19 NOTE — PROGRESS NOTES
Infusion Nursing Note:  Jennifer Cervantes presents today for Crizanlizumab + Add-on Pain meds/IV Fluids.    Patient seen by provider today: No   present during visit today: Not Applicable.    Note: Patient arrives feeling okay. Reports 9/10 bilateral hip pain that radiates down lower legs. Received pain/meds IV fluids here yesterday and Monday and she doesn't think we will be able to accommodate her again today per triage pain protocol. Writer reached out to Dr. Duncan. Pt also has been having issues with spontaneous vomiting either before/after meals. Vomited twice yesterday. Didn't eat anything this morning but was able to tolerate crackers/cookies here. She denies significant weight loss and manages to eat. Scheduled for EGD early May.    Per [facility] SecureChat Dr. Duncan/Allison Orozco, RN @1216 4/19/24:  - Please give pain meds/IV fluids now. Would like to avoid ED.    Pt requested Dilaudid, Zofran and Toradol. After three doses of IV Dilaudid given, pt reported pain at a 3/10 and was acceptable for discharge.    Pt expressed issues with anxiety today and writer offered piotr referral. Pt appreciated efforts and verbalized she would like a referral placed. Referral made by Dr. Duncan per request.      Intravenous Access:  Implanted Port.    Treatment Conditions:  Lab Results   Component Value Date    HGB 7.4 (L) 04/19/2024    WBC 8.1 04/19/2024    ANEU 9.0 (H) 03/17/2024    ANEUTAUTO 8.5 (H) 04/11/2024     04/19/2024        Lab Results   Component Value Date     04/19/2024    POTASSIUM 4.0 04/19/2024    MAG 2.0 11/11/2021    CR 0.57 04/19/2024    MICAH 8.5 (L) 04/19/2024    BILITOTAL 3.1 (H) 04/11/2024    ALBUMIN 4.1 04/11/2024    ALT 19 04/11/2024    AST 51 (H) 04/11/2024       Results reviewed, labs MET treatment parameters, ok to proceed with treatment.  Biological Infusion Checklist:  ~~~ NOTE: If the patient answers yes to any of the questions below, hold the infusion and contact  ordering provider or on-call provider.    Have you recently had an elevated temperature, fever, chills, productive cough, coughing for 3 weeks or longer or hemoptysis,  abnormal vital signs, night sweats,  chest pain or have you noticed a decrease in your appetite, unexplained weight loss or fatigue? No  Do you have any open wounds or new incisions? No  Do you have any upcoming hospitalizations or surgeries? Does not include esophagogastroduodenoscopy, colonoscopy, endoscopic retrograde cholangiopancreatography (ERCP), endoscopic ultrasound (EUS), dental procedures or joint aspiration/steroid injections No (having EDG 5/9)  Do you currently have any signs of illness or infection or are you on any antibiotics? No  Have you had any new, sudden or worsening abdominal pain? No  Have you or anyone in your household received a live vaccination in the past 4 weeks? Please note: No live vaccines while on biologic/chemotherapy until 6 months after the last treatment. Patient can receive the flu vaccine (shot only), pneumovax and the Covid vaccine. It is optimal for the patient to get these vaccines mid cycle, but they can be given at any time as long as it is not on the day of the infusion. No  Have you recently been diagnosed with any new nervous system diseases (ie. Multiple sclerosis, Guillain Huntington, seizures, neurological changes) or cancer diagnosis? Are you on any form of radiation or chemotherapy? No  Are you pregnant or breast feeding or do you have plans of pregnancy in the future? No  Have you been having any signs of worsening depression or suicidal ideations?  (benlysta only) No  Have there been any other new onset medical symptoms? No  Have you had any new blood clots? (IVIG only) No      Post Infusion Assessment:  Patient tolerated infusion without incident.  Blood return noted pre and post infusion.  Patient observed for 30 minutes post Jr per protocol.  Site patent and intact, free from redness, edema or  discomfort.  No evidence of extravasations.  Access discontinued per protocol.       Discharge Plan:   Patient declined prescription refills.  Discharge instructions reviewed with: Patient.  Patient and/or family verbalized understanding of discharge instructions and all questions answered.  AVS to patient via [facility] [facility].  Patient will return 4/26 for ANDREAS visit and 4/29 labs/visit with Dr. Duncan.  Patient discharged in stable condition accompanied by: self.  Departure Mode: Ambulatory.      Allison Orozco RN

## 2024-04-19 NOTE — NURSING NOTE
Chief Complaint   Patient presents with    Port Draw     Vitals taken, port accessed, labs drawn, heparin locked, checked into next apppt     /70 (BP Location: Left arm, Patient Position: Sitting, Cuff Size: Adult Large)   Pulse 88   Temp 98.6  F (37  C) (Oral)   Resp 16   Wt 69.3 kg (152 lb 12.8 oz)   SpO2 90%   BMI 26.23 kg/m    Aravind Feldman RN on 4/19/2024 at 11:05 AM

## 2024-04-21 LAB
ABO/RH(D): NORMAL
ANTIBODY SCREEN: NEGATIVE
SPECIMEN EXPIRATION DATE: NORMAL

## 2024-04-22 ENCOUNTER — HOSPITAL ENCOUNTER (EMERGENCY)
Facility: CLINIC | Age: 24
Discharge: HOME | End: 2024-04-22
Attending: FAMILY MEDICINE | Admitting: FAMILY MEDICINE
Payer: COMMERCIAL

## 2024-04-22 ENCOUNTER — NURSE TRIAGE (OUTPATIENT)
Dept: ONCOLOGY | Facility: CLINIC | Age: 24
End: 2024-04-22
Payer: COMMERCIAL

## 2024-04-22 ENCOUNTER — APPOINTMENT (OUTPATIENT)
Dept: NUCLEAR MEDICINE | Facility: CLINIC | Age: 24
End: 2024-04-22
Attending: FAMILY MEDICINE
Payer: COMMERCIAL

## 2024-04-22 ENCOUNTER — INFUSION THERAPY VISIT (OUTPATIENT)
Dept: TRANSPLANT | Facility: CLINIC | Age: 24
End: 2024-04-22
Attending: PEDIATRICS
Payer: COMMERCIAL

## 2024-04-22 ENCOUNTER — ANCILLARY PROCEDURE (OUTPATIENT)
Dept: GENERAL RADIOLOGY | Facility: CLINIC | Age: 24
End: 2024-04-22
Attending: PEDIATRICS
Payer: COMMERCIAL

## 2024-04-22 VITALS
RESPIRATION RATE: 16 BRPM | HEART RATE: 89 BPM | SYSTOLIC BLOOD PRESSURE: 112 MMHG | OXYGEN SATURATION: 97 % | TEMPERATURE: 97.7 F | DIASTOLIC BLOOD PRESSURE: 64 MMHG

## 2024-04-22 VITALS
OXYGEN SATURATION: 100 % | TEMPERATURE: 98.6 F | DIASTOLIC BLOOD PRESSURE: 71 MMHG | HEART RATE: 74 BPM | RESPIRATION RATE: 18 BRPM | SYSTOLIC BLOOD PRESSURE: 119 MMHG

## 2024-04-22 DIAGNOSIS — D57.00 SICKLE CELL PAIN CRISIS (H): Primary | ICD-10-CM

## 2024-04-22 DIAGNOSIS — R09.02 HYPOXIA: ICD-10-CM

## 2024-04-22 DIAGNOSIS — D57.00 SICKLE CELL DISEASE WITH CRISIS (H): ICD-10-CM

## 2024-04-22 DIAGNOSIS — G81.10 SPASTIC HEMIPLEGIA, UNSPECIFIED ETIOLOGY, UNSPECIFIED LATERALITY (H): ICD-10-CM

## 2024-04-22 LAB
ALBUMIN SERPL BCG-MCNC: 4.1 G/DL (ref 3.5–5.2)
ALP SERPL-CCNC: 53 U/L (ref 40–150)
ALT SERPL W P-5'-P-CCNC: 14 U/L (ref 0–50)
ANION GAP SERPL CALCULATED.3IONS-SCNC: 10 MMOL/L (ref 7–15)
AST SERPL W P-5'-P-CCNC: 48 U/L (ref 0–45)
BASOPHILS # BLD AUTO: 0.2 10E3/UL (ref 0–0.2)
BASOPHILS NFR BLD AUTO: 2 %
BILIRUB SERPL-MCNC: 3.6 MG/DL
BLD PROD TYP BPU: NORMAL
BLOOD COMPONENT TYPE: NORMAL
BUN SERPL-MCNC: 7.3 MG/DL (ref 6–20)
CALCIUM SERPL-MCNC: 8.3 MG/DL (ref 8.6–10)
CHLORIDE SERPL-SCNC: 107 MMOL/L (ref 98–107)
CODING SYSTEM: NORMAL
CREAT SERPL-MCNC: 0.51 MG/DL (ref 0.51–0.95)
CROSSMATCH: NORMAL
DEPRECATED HCO3 PLAS-SCNC: 22 MMOL/L (ref 22–29)
EGFRCR SERPLBLD CKD-EPI 2021: >90 ML/MIN/1.73M2
EOSINOPHIL # BLD AUTO: 0.9 10E3/UL (ref 0–0.7)
EOSINOPHIL NFR BLD AUTO: 7 %
ERYTHROCYTE [DISTWIDTH] IN BLOOD BY AUTOMATED COUNT: 23.5 % (ref 10–15)
GLUCOSE SERPL-MCNC: 88 MG/DL (ref 70–99)
HCT VFR BLD AUTO: 17.3 % (ref 35–47)
HGB BLD-MCNC: 6.1 G/DL (ref 11.7–15.7)
IMM GRANULOCYTES # BLD: 0.1 10E3/UL
IMM GRANULOCYTES NFR BLD: 1 %
ISSUE DATE AND TIME: NORMAL
LYMPHOCYTES # BLD AUTO: 2.8 10E3/UL (ref 0.8–5.3)
LYMPHOCYTES NFR BLD AUTO: 22 %
MCH RBC QN AUTO: 31 PG (ref 26.5–33)
MCHC RBC AUTO-ENTMCNC: 35.3 G/DL (ref 31.5–36.5)
MCV RBC AUTO: 88 FL (ref 78–100)
MONOCYTES # BLD AUTO: 1 10E3/UL (ref 0–1.3)
MONOCYTES NFR BLD AUTO: 8 %
NEUTROPHILS # BLD AUTO: 7.9 10E3/UL (ref 1.6–8.3)
NEUTROPHILS NFR BLD AUTO: 60 %
NRBC # BLD AUTO: 0.3 10E3/UL
NRBC BLD AUTO-RTO: 2 /100
NT-PROBNP SERPL-MCNC: 67 PG/ML (ref 0–450)
PLATELET # BLD AUTO: 342 10E3/UL (ref 150–450)
POTASSIUM SERPL-SCNC: 3.8 MMOL/L (ref 3.4–5.3)
PROT SERPL-MCNC: 6.7 G/DL (ref 6.4–8.3)
RBC # BLD AUTO: 1.97 10E6/UL (ref 3.8–5.2)
RETICS # AUTO: 0.54 10E6/UL (ref 0.03–0.1)
RETICS/RBC NFR AUTO: 28.6 % (ref 0.5–2)
SODIUM SERPL-SCNC: 139 MMOL/L (ref 135–145)
UNIT ABO/RH: NORMAL
UNIT NUMBER: NORMAL
UNIT STATUS: NORMAL
UNIT TYPE ISBT: 9500
WBC # BLD AUTO: 12.9 10E3/UL (ref 4–11)

## 2024-04-22 PROCEDURE — A9540 TC99M MAA: HCPCS | Performed by: FAMILY MEDICINE

## 2024-04-22 PROCEDURE — 272N000035 NM LUNG SCAN VENTILATION AND PERFUSION

## 2024-04-22 PROCEDURE — 99284 EMERGENCY DEPT VISIT MOD MDM: CPT | Mod: 25 | Performed by: FAMILY MEDICINE

## 2024-04-22 PROCEDURE — 71046 X-RAY EXAM CHEST 2 VIEWS: CPT | Performed by: RADIOLOGY

## 2024-04-22 PROCEDURE — 96374 THER/PROPH/DIAG INJ IV PUSH: CPT | Mod: 59 | Performed by: FAMILY MEDICINE

## 2024-04-22 PROCEDURE — 96375 TX/PRO/DX INJ NEW DRUG ADDON: CPT

## 2024-04-22 PROCEDURE — A9567 TECHNETIUM TC-99M AEROSOL: HCPCS | Performed by: FAMILY MEDICINE

## 2024-04-22 PROCEDURE — 80053 COMPREHEN METABOLIC PANEL: CPT

## 2024-04-22 PROCEDURE — 96374 THER/PROPH/DIAG INJ IV PUSH: CPT

## 2024-04-22 PROCEDURE — 86900 BLOOD TYPING SEROLOGIC ABO: CPT

## 2024-04-22 PROCEDURE — 96376 TX/PRO/DX INJ SAME DRUG ADON: CPT

## 2024-04-22 PROCEDURE — 250N000013 HC RX MED GY IP 250 OP 250 PS 637: Performed by: PEDIATRICS

## 2024-04-22 PROCEDURE — 250N000011 HC RX IP 250 OP 636: Performed by: PEDIATRICS

## 2024-04-22 PROCEDURE — 83880 ASSAY OF NATRIURETIC PEPTIDE: CPT

## 2024-04-22 PROCEDURE — 86923 COMPATIBILITY TEST ELECTRIC: CPT | Performed by: PEDIATRICS

## 2024-04-22 PROCEDURE — 96361 HYDRATE IV INFUSION ADD-ON: CPT

## 2024-04-22 PROCEDURE — 36591 DRAW BLOOD OFF VENOUS DEVICE: CPT

## 2024-04-22 PROCEDURE — 258N000003 HC RX IP 258 OP 636: Performed by: PEDIATRICS

## 2024-04-22 PROCEDURE — 78582 LUNG VENTILAT&PERFUS IMAGING: CPT

## 2024-04-22 PROCEDURE — 85025 COMPLETE CBC W/AUTO DIFF WBC: CPT

## 2024-04-22 PROCEDURE — 250N000011 HC RX IP 250 OP 636: Performed by: FAMILY MEDICINE

## 2024-04-22 PROCEDURE — 85045 AUTOMATED RETICULOCYTE COUNT: CPT

## 2024-04-22 PROCEDURE — 343N000001 HC RX 343: Performed by: FAMILY MEDICINE

## 2024-04-22 PROCEDURE — 78582 LUNG VENTILAT&PERFUS IMAGING: CPT | Mod: 26 | Performed by: RADIOLOGY

## 2024-04-22 PROCEDURE — 99284 EMERGENCY DEPT VISIT MOD MDM: CPT | Performed by: FAMILY MEDICINE

## 2024-04-22 RX ORDER — HEPARIN SODIUM,PORCINE 10 UNIT/ML
5 VIAL (ML) INTRAVENOUS
Status: CANCELLED | OUTPATIENT
Start: 2024-04-22

## 2024-04-22 RX ORDER — ONDANSETRON 4 MG/1
8 TABLET, FILM COATED ORAL
Status: CANCELLED
Start: 2024-07-01

## 2024-04-22 RX ORDER — DIPHENHYDRAMINE HCL 25 MG
50 CAPSULE ORAL
Status: CANCELLED
Start: 2024-07-01

## 2024-04-22 RX ORDER — HEPARIN SODIUM (PORCINE) LOCK FLUSH IV SOLN 100 UNIT/ML 100 UNIT/ML
5 SOLUTION INTRAVENOUS
Status: CANCELLED | OUTPATIENT
Start: 2024-04-22

## 2024-04-22 RX ORDER — DIPHENHYDRAMINE HCL 25 MG
50 CAPSULE ORAL
Status: COMPLETED | OUTPATIENT
Start: 2024-04-22 | End: 2024-04-22

## 2024-04-22 RX ORDER — HEPARIN SODIUM (PORCINE) LOCK FLUSH IV SOLN 100 UNIT/ML 100 UNIT/ML
5 SOLUTION INTRAVENOUS
Status: CANCELLED | OUTPATIENT
Start: 2024-07-01

## 2024-04-22 RX ORDER — KETOROLAC TROMETHAMINE 30 MG/ML
30 INJECTION, SOLUTION INTRAMUSCULAR; INTRAVENOUS ONCE
Status: CANCELLED
Start: 2024-07-01 | End: 2024-07-01

## 2024-04-22 RX ORDER — ONDANSETRON 2 MG/ML
8 INJECTION INTRAMUSCULAR; INTRAVENOUS EVERY 6 HOURS PRN
Status: DISCONTINUED | OUTPATIENT
Start: 2024-04-22 | End: 2024-04-22 | Stop reason: HOSPADM

## 2024-04-22 RX ORDER — HEPARIN SODIUM,PORCINE 10 UNIT/ML
5 VIAL (ML) INTRAVENOUS
Status: CANCELLED | OUTPATIENT
Start: 2024-07-01

## 2024-04-22 RX ORDER — ONDANSETRON 2 MG/ML
8 INJECTION INTRAMUSCULAR; INTRAVENOUS EVERY 6 HOURS PRN
Status: CANCELLED
Start: 2024-07-01

## 2024-04-22 RX ORDER — KETOROLAC TROMETHAMINE 30 MG/ML
30 INJECTION, SOLUTION INTRAMUSCULAR; INTRAVENOUS ONCE
Status: COMPLETED | OUTPATIENT
Start: 2024-04-22 | End: 2024-04-22

## 2024-04-22 RX ADMIN — SODIUM CHLORIDE, POTASSIUM CHLORIDE, SODIUM LACTATE AND CALCIUM CHLORIDE 1000 ML: 600; 310; 30; 20 INJECTION, SOLUTION INTRAVENOUS at 09:38

## 2024-04-22 RX ADMIN — ONDANSETRON 8 MG: 2 INJECTION INTRAMUSCULAR; INTRAVENOUS at 09:39

## 2024-04-22 RX ADMIN — HYDROMORPHONE HYDROCHLORIDE 1 MG: 1 INJECTION, SOLUTION INTRAMUSCULAR; INTRAVENOUS; SUBCUTANEOUS at 13:52

## 2024-04-22 RX ADMIN — HYDROMORPHONE HYDROCHLORIDE 1 MG: 1 INJECTION, SOLUTION INTRAMUSCULAR; INTRAVENOUS; SUBCUTANEOUS at 10:36

## 2024-04-22 RX ADMIN — KETOROLAC TROMETHAMINE 30 MG: 30 INJECTION, SOLUTION INTRAMUSCULAR; INTRAVENOUS at 09:38

## 2024-04-22 RX ADMIN — HYDROMORPHONE HYDROCHLORIDE 1 MG: 1 INJECTION, SOLUTION INTRAMUSCULAR; INTRAVENOUS; SUBCUTANEOUS at 09:38

## 2024-04-22 RX ADMIN — KIT FOR THE PREPARATION OF TECHNETIUM TC 99M PENTETATE 2 MILLICURIE: 20 INJECTION, POWDER, LYOPHILIZED, FOR SOLUTION INTRAVENOUS; RESPIRATORY (INHALATION) at 21:08

## 2024-04-22 RX ADMIN — HYDROMORPHONE HYDROCHLORIDE 1 MG: 1 INJECTION, SOLUTION INTRAMUSCULAR; INTRAVENOUS; SUBCUTANEOUS at 22:53

## 2024-04-22 RX ADMIN — KIT FOR THE PREPARATION OF TECHNETIUM TC 99M ALBUMIN AGGREGATED 7.1 MILLICURIE: 2.5 INJECTION, POWDER, FOR SOLUTION INTRAVENOUS at 21:25

## 2024-04-22 RX ADMIN — DIPHENHYDRAMINE HYDROCHLORIDE 50 MG: 25 CAPSULE ORAL at 09:39

## 2024-04-22 ASSESSMENT — ACTIVITIES OF DAILY LIVING (ADL)
ADLS_ACUITY_SCORE: 37

## 2024-04-22 NOTE — TELEPHONE ENCOUNTER
Oncology Nurse Triage - Sickle Cell Pain Crisis:    Situation: Jennifer  calling about Sickle Cell Pain Crisis, requesting to be added on for IV fluids and pain medicine    Background:     Patient's last infusion was 04/19/24  Last clinic visit date:04/08/24 w/Patricia Mantilla  Does patient have active treatment plan?  Yes      Assessment of Symptoms:  Onset/Duration of symptoms: 1 day    Is it typical sickle cell pain? Yes   Location: Down back shoulders and down to waist  Character: Sharp           Intensity: 8/10    Any radiation of pain, numbness, tingling, weakness, warmth, swelling, discoloration of arms or legs?No     Fever?No  (if yes max temperature recorded in last 24 hours):      Chest Pain Present: No     Shortness of breath: No     Other home therapies tried: HEAT/HEATING PAD and WARM BATH     Last home medication taken and when: Oxycodone 8pm    Any Refills Needed?: No     Does patient have transportation & length of time to get to clinic: Yes, 10 min        Recommendations:     Added to infusion wait list for IVF/pain meds per protocol.      If you do not hear from the infusion center by 2pm then you will not be able to get in for an infusion today. If symptoms worsen while waiting for call back, and/or you experience fever, chills, SOB, chest pain, cough, n/v, dizziness, numbness, swelling, discoloration of extremities, then seek emergency evaluation in Emergency Department.     Please note, if you are late for your appt, you risk losing your infusion appt as it may delay another patient's infusion who arrived on time.

## 2024-04-22 NOTE — TELEPHONE ENCOUNTER
Available appt at [place] [facility] at 1000. Call to pt to offer appt. Pt declining and stating she will wait to see if appt becomes available at [facility].    Post-Care Instructions: I reviewed with the patient in detail post-care instructions. Patient is to wear sunprotection, and avoid picking at any of the treated lesions. Pt may apply Vaseline to crusted or scabbing areas. Consent: The patient's consent was obtained including but not limited to risks of crusting, scabbing, blistering, scarring, darker or lighter pigmentary change, recurrence, incomplete removal and infection. Duration Of Freeze Thaw-Cycle (Seconds): 3 Detail Level: Simple Render Note In Bullet Format When Appropriate: No Medical Necessity Clause: This procedure was medically necessary because the lesions that were treated were: Medical Necessity Information: It is in your best interest to select a reason for this procedure from the list below. All of these items fulfill various CMS LCD requirements except the new and changing color options.

## 2024-04-22 NOTE — ED TRIAGE NOTES
Triage Assessment (Adult)       Row Name 04/22/24 1603          Triage Assessment    Airway WDL WDL        Respiratory WDL    Respiratory WDL WDL        Skin Circulation/Temperature WDL    Skin Circulation/Temperature WDL WDL        Cardiac WDL    Cardiac WDL WDL        Peripheral/Neurovascular WDL    Peripheral Neurovascular WDL WDL        Cognitive/Neuro/Behavioral WDL    Cognitive/Neuro/Behavioral WDL WDL

## 2024-04-22 NOTE — ED NOTES
Pt received care for Sickle Cell crisis today (Benadryl, Toradol, IV Dilaudid) at infusion clinic.  She began to have hypoxia (O2 sats in mid-80's). She was sent to ER due to hypoxia and different back pain than usual.

## 2024-04-22 NOTE — ED PROVIDER NOTES
ED Provider Note  [facility]      History     Chief Complaint   Patient presents with    Sickle Cell Pain Crisis     Was at clinic today, experienced hypoxia and came via EMS     HPI  Jennifer Cervantes is a 24 year old female with a care plan with a history of sickle cell anemia and asthma who presents the emergency room with recent evaluation in clinic including treatment with pain medications which have been successful however it was noted that she was hypoxic and her clinic and they sent her here for evaluation for possible pulmonary embolism.  Patient has had a history of previous pulmonary embolism and at this time would require a VQ scan for evaluation because she has an allergy to iodine.  Patient does note that she has had some increase in shortness of breath but denies any specific chest pain at this time she states that her pain has been managed with the medications given in clinic.    Past Medical History  Past Medical History:   Diagnosis Date    Anxiety     Bleeding disorder (H24)     Blood clotting disorder (H24)     Cerebral infarction (H) 2015    Cognitive developmental delay     low IQ. 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 side (H)     right hand contractures    Iron overload due to repeated red blood cell transfusions     Migraines     Multiple subsegmental pulmonary emboli without acute cor pulmonale (H) 02/01/2021    Oppositional defiant behavior     Presence of intrauterine contraceptive device 2/18/2020    Superficial venous thrombosis of arm, right 03/25/2021    Uncomplicated asthma      Past Surgical History:   Procedure Laterality Date    AS INSERT TUNNELED CV 2 CATH W/O PORT/PUMP      CHOLECYSTECTOMY      CV RIGHT HEART CATH MEASUREMENTS RECORDED N/A 11/18/2021    Procedure: Right Heart Cath;  Surgeon: Jackson Stauffer MD;  Location:   HEART CARDIAC CATH LAB    INSERT PORT VASCULAR ACCESS Left 4/21/2021    Procedure: INSERTION, VASCULAR ACCESS PORT (NOT SURE ON SIDE UNTIL REMOVAL);  Surgeon: Rajan More MD;  Location: [facility] OR    IR CHEST PORT PLACEMENT > 5 YRS OF AGE  4/21/2021    IR CVC NON TUNNEL LINE REMOVAL  5/6/2021    IR CVC NON TUNNEL PLACEMENT > 5 YRS  04/07/2020    IR CVC NON TUNNEL PLACEMENT > 5 YRS  4/30/2021    IR CVC NON TUNNEL PLACEMENT > 5 YRS  9/7/2022    IR PORT REMOVAL LEFT  4/21/2021    REMOVE PORT VASCULAR ACCESS Left 4/21/2021    Procedure: REMOVAL, VASCULAR ACCESS PORT LEFT;  Surgeon: Rajan More MD;  Location: [facility] OR    REPAIR TENDON ELBOW Right 10/02/2019    Procedure: Right Elbow Flexor Lengthening, Flexor Pronator Slide Of Wrist and Finger, Thumb Adductor Lengthening;  Surgeon: Anai Franco MD;  Location: [facility] OR    TONSILLECTOMY Bilateral 10/02/2019    Procedure: Bilateral Tonsillectomy;  Surgeon: Farhana Guy MD;  Location: [facility] OR    ZZC BREAST REDUCTION (INCLUDES LIPO) TIER 3 Bilateral 04/23/2019     albuterol (PROAIR HFA/PROVENTIL HFA/VENTOLIN HFA) 108 (90 Base) MCG/ACT inhaler  albuterol (PROVENTIL) (2.5 MG/3ML) 0.083% neb solution  aspirin (ASA) 81 MG chewable tablet  budesonide-formoterol (SYMBICORT) 160-4.5 MCG/ACT Inhaler  budesonide-formoterol (SYMBICORT) 160-4.5 MCG/ACT Inhaler  cetirizine (ZYRTEC) 10 MG tablet  deferasirox (JADENU) 360 MG tablet  diphenhydrAMINE (BENADRYL) 25 MG capsule  Hydroxyurea 1000 MG TABS  ibuprofen (ADVIL/MOTRIN) 600 MG tablet  melatonin 5 MG tablet  methocarbamol (ROBAXIN) 750 MG tablet  omeprazole (PRILOSEC) 20 MG DR capsule  ondansetron (ZOFRAN) 8 MG tablet  oxyCODONE IR (ROXICODONE) 10 MG tablet  acetaminophen (TYLENOL) 325 MG tablet  EPINEPHrine (ANY BX GENERIC EQUIV) 0.3 MG/0.3ML injection 2-pack  hydroxyurea (HYDREA) 500 MG capsule  naloxone (NARCAN) 4 MG/0.1ML nasal spray  naloxone (NARCAN) 4 MG/0.1ML nasal spray      Allergies   Allergen Reactions     Contrast Dye      Hives and breathing issues    Fish-Derived Products Hives    Seafood Hives    Adhesive Tape Hives     Primipore dressing causes hives    Gadolinium     Iodinated Contrast Media      Family History  Family History   Problem Relation Age of Onset    Sickle Cell Trait Mother     Hypertension Mother     Asthma Mother     Sickle Cell Trait Father     Glaucoma No family hx of     Macular Degeneration No family hx of     Diabetes No family hx of     Gout No family hx of      Social History   Social History     Tobacco Use    Smoking status: Never     Passive exposure: Never    Smokeless tobacco: Never   Substance Use Topics    Alcohol use: Not Currently     Alcohol/week: 0.0 standard drinks of alcohol    Drug use: Never         A medically appropriate review of systems was performed with pertinent positives and negatives noted in the HPI, and all other systems negative.    Physical Exam   BP: 116/74  Pulse: 74  Temp: 98.6  F (37  C)  Resp: 18  SpO2: 97 %  Physical Exam  Constitutional:       General: She is not in acute distress.     Appearance: Normal appearance. She is not diaphoretic.   HENT:      Head: Atraumatic.      Mouth/Throat:      Mouth: Mucous membranes are moist.   Eyes:      General: No scleral icterus.     Conjunctiva/sclera: Conjunctivae normal.   Cardiovascular:      Rate and Rhythm: Normal rate.      Heart sounds: Normal heart sounds.   Pulmonary:      Effort: No respiratory distress.      Breath sounds: Normal breath sounds.   Abdominal:      General: Abdomen is flat.   Musculoskeletal:      Cervical back: Neck supple.   Skin:     General: Skin is warm.      Findings: No rash.   Neurological:      General: No focal deficit present.      Mental Status: She is alert and oriented to person, place, and time.           ED Course, Procedures, & Data      Procedures          Results for orders placed or performed during the hospital encounter of 04/22/24   NM Lung Scan Ventilation and  Perfusion     Status: None    Narrative    Examination:NM LUNG SCAN VENTILATION AND PERFUSION, 2024 9:55 PM     Indication:  sickle cell  sats contrast allergy     Additional Information: none    Comparison: Same day chest radiographs    Technique:    The patient received 2.0 mCi of Tc-99m DTPA aerosol for ventilation  and 7.1 mCi of Tc-99m MAA for perfusion. Multiple images were obtained  of the lungs in Anterior, posterior, RICHTER, RPO, LPO, and  Tongan  projections.    Comparison: VQ scan 2023, 2023. Same day chest radiograph.    Findings:    The ventilation study demonstrates somewhat inhomogenous distribution  of radiotracer with clumping in the bilateral hilar regions as well as  swallowed radiotracer in the stomach.    The perfusion study demonstrates areas of matched defects in the right  lung posteriorly along the fissure as well as the left lateral lung  posteriorly. No definite mismatched defects.      Impression    Impression:  No mismatched perfusion defects to suggest acute pulmonary embolus.    I have personally reviewed the examination and initial interpretation  and I agree with the findings.    KEN BARBOUR DO         SYSTEM ID:  [id number]   Results for orders placed or performed in visit on 24   XR Chest 2 Views     Status: None    Narrative    Chest 2 views    INDICATION: Hypoxia. Sickle cell disease.    COMPARISON: 2024    FINDINGS: Heart size and shape normal. No consolidation.  Cholecystectomy clips right upper abdomen. Left IJ catheter tip in the  superior cavoatrial junction. No visible advanced bony changes from  sickle cell.      Impression    IMPRESSION: No acute findings radiographically.    MARY JANE JUÁREZ MD         SYSTEM ID:  [id number]   Results for orders placed or performed in visit on 24   Comprehensive metabolic panel     Status: Abnormal   Result Value Ref Range    Sodium 139 135 - 145 mmol/L    Potassium 3.8 3.4 - 5.3 mmol/L    Carbon  Dioxide (CO2) 22 22 - 29 mmol/L    Anion Gap 10 7 - 15 mmol/L    Urea Nitrogen 7.3 6.0 - 20.0 mg/dL    Creatinine 0.51 0.51 - 0.95 mg/dL    GFR Estimate >90 >60 mL/min/1.73m2    Calcium 8.3 (L) 8.6 - 10.0 mg/dL    Chloride 107 98 - 107 mmol/L    Glucose 88 70 - 99 mg/dL    Alkaline Phosphatase 53 40 - 150 U/L    AST 48 (H) 0 - 45 U/L    ALT 14 0 - 50 U/L    Protein Total 6.7 6.4 - 8.3 g/dL    Albumin 4.1 3.5 - 5.2 g/dL    Bilirubin Total 3.6 (H) <=1.2 mg/dL   N terminal pro BNP outpatient     Status: Normal   Result Value Ref Range    N Terminal Pro BNP Outpatient 67 0 - 450 pg/mL   Reticulocyte count     Status: Abnormal   Result Value Ref Range    % Reticulocyte 28.6 (H) 0.5 - 2.0 %    Absolute Reticulocyte 0.540 (H) 0.025 - 0.095 10e6/uL   CBC with platelets and differential     Status: Abnormal   Result Value Ref Range    WBC Count 12.9 (H) 4.0 - 11.0 10e3/uL    RBC Count 1.97 (L) 3.80 - 5.20 10e6/uL    Hemoglobin 6.1 (LL) 11.7 - 15.7 g/dL    Hematocrit 17.3 (L) 35.0 - 47.0 %    MCV 88 78 - 100 fL    MCH 31.0 26.5 - 33.0 pg    MCHC 35.3 31.5 - 36.5 g/dL    RDW 23.5 (H) 10.0 - 15.0 %    Platelet Count 342 150 - 450 10e3/uL    % Neutrophils 60 %    % Lymphocytes 22 %    % Monocytes 8 %    % Eosinophils 7 %    % Basophils 2 %    % Immature Granulocytes 1 %    NRBCs per 100 WBC 2 (H) <1 /100    Absolute Neutrophils 7.9 1.6 - 8.3 10e3/uL    Absolute Lymphocytes 2.8 0.8 - 5.3 10e3/uL    Absolute Monocytes 1.0 0.0 - 1.3 10e3/uL    Absolute Eosinophils 0.9 (H) 0.0 - 0.7 10e3/uL    Absolute Basophils 0.2 0.0 - 0.2 10e3/uL    Absolute Immature Granulocytes 0.1 <=0.4 10e3/uL    Absolute NRBCs 0.3 10e3/uL   Adult Type and Screen     Status: None   Result Value Ref Range    ABO/RH(D) O POS     Antibody Screen Negative Negative    SPECIMEN EXPIRATION DATE [id number]    CBC with platelets differential     Status: Abnormal    Narrative    The following orders were created for panel order CBC with platelets  differential.  Procedure                               Abnormality         Status                     ---------                               -----------         ------                     CBC with platelets and d...[[id number]]  Abnormal            Final result                 Please view results for these tests on the individual orders.   ABO/Rh type and screen     Status: None    Narrative    The following orders were created for panel order ABO/Rh type and screen.  Procedure                               Abnormality         Status                     ---------                               -----------         ------                     Adult Type and Screen[[id number]]                            Final result                 Please view results for these tests on the individual orders.     Medications   technetium pentetate Tc99m (DTPA) inhaled radioisotope 2 millicurie (2 millicuries Inhalation $Given 4/22/24 2108)   technetium pertechnetate with albumin (Tc99m MAA) radioisotope injection 4.8-7.2 millicurie (7.1 millicuries Intravenous $Given 4/22/24 2125)   HYDROmorphone (DILAUDID) injection 1 mg (1 mg Intravenous $Given 4/22/24 2253)     Labs Ordered and Resulted from Time of ED Arrival to Time of ED Departure - No data to display  NM Lung Scan Ventilation and Perfusion   Final Result   Impression:   No mismatched perfusion defects to suggest acute pulmonary embolus.      I have personally reviewed the examination and initial interpretation   and I agree with the findings.      KEN BARBOUR DO            SYSTEM ID:  [id number]             Critical care was not performed.     Medical Decision Making  The patient's presentation was of moderate complexity (a chronic illness mild to moderate exacerbation, progression, or side effect of treatment).    The patient's evaluation involved:  ordering and/or review of 3+ test(s) in this encounter (see separate area of note for details) patient was also discussed with  hematology who stated that the patient needs to have a PE ruled out and that if symptoms have decreased would follow-up with her tomorrow.    The patient's management necessitated high risk (a decision regarding hospitalization).  We had a discussion of the need for possible hospitalization if she is still hypoxic patient feels confident that she can manage her symptoms at home and is scheduled for transfusion tomorrow in the transfusion center.    Assessment & Plan        I have reviewed the nursing notes. I have reviewed the findings, diagnosis, plan and need for follow up with the patient.        Final diagnoses:   Sickle cell disease with crisis (H)       Jose Eduardo Rush  [facility] EMERGENCY DEPARTMENT  4/22/2024     Jose Eduardo Rush MD  04/23/24 2041

## 2024-04-22 NOTE — PROGRESS NOTES
Infusion Nursing Note:  Jennifer Cervantes presents today for IVF and pain meds.    Patient seen by provider today: No   present during visit today: Not Applicable.    Note: Jennifer here today for add on IVF and pain meds. Arrives reporting 8/10 pain that starts in upper back and shoulders and radiates down to her low back. This is slightly unusual for her usual low back pain. She endorses some ORTEGA and yesterday had chest pain with drinking cold fluids and deep breaths. On arrival, she is slightly hypoxic, 88-89% on RA. Contacted Dr. Duncan who ordered CBC/CMP/Retic/BNP. Hgb found to be 6.1, Dr. Duncan was notified, pt set up for transfusion tomorrow. In ongoing care for the patient, hypoxia did not recover. Pt consistently 85-86% on room air, Dr. Duncan recommending pt be seen in ED for PE rule out. Pt is upset, but agreeable to going. Dr Duncan ok'd proceeding with pain plan today. Pt placed on 2-3L NC. [facility] transport contacted and pt was discharged in their care to be transported to [facility] ED for further workup.      Intravenous Access:  Implanted Port.    Treatment Conditions:  Lab Results   Component Value Date    HGB 6.1 (LL) 04/22/2024    WBC 12.9 (H) 04/22/2024    ANEU 9.0 (H) 03/17/2024    ANEUTAUTO 7.9 04/22/2024     04/22/2024        Lab Results   Component Value Date     04/22/2024    POTASSIUM 3.8 04/22/2024    MAG 2.0 11/11/2021    CR 0.51 04/22/2024    MICAH 8.3 (L) 04/22/2024    BILITOTAL 3.6 (H) 04/22/2024    ALBUMIN 4.1 04/22/2024    ALT 14 04/22/2024    AST 48 (H) 04/22/2024         Post Infusion Assessment:  Patient tolerated infusion without incident.  Blood return noted pre and post infusion.  Site patent and intact, free from redness, edema or discomfort.  No evidence of extravasations.  Access discontinued per protocol.       Discharge Plan:   Patient discharged in stable condition accompanied by: EMS  Departure Mode: Cart.      Allison Bowman RN

## 2024-04-22 NOTE — TELEPHONE ENCOUNTER
Writer received message from BMT infusion charge Allison stating they will take pt now since she is in clinic. Message sent to [facility] to have pt added to schedule.

## 2024-04-22 NOTE — ED NOTES
Bed: ED18  Expected date: 4/22/24  Expected time: 3:35 PM  Means of arrival: Ambulance  Comments:  [facility] 25yo female, sickle cell crisis

## 2024-04-23 ENCOUNTER — HOSPITAL ENCOUNTER (INPATIENT)
Facility: CLINIC | Age: 24
LOS: 6 days | Discharge: HOME | DRG: 189 | End: 2024-04-29
Attending: FAMILY MEDICINE | Admitting: INTERNAL MEDICINE
Payer: COMMERCIAL

## 2024-04-23 ENCOUNTER — APPOINTMENT (OUTPATIENT)
Dept: GENERAL RADIOLOGY | Facility: CLINIC | Age: 24
DRG: 189 | End: 2024-04-23
Attending: FAMILY MEDICINE
Payer: COMMERCIAL

## 2024-04-23 ENCOUNTER — INFUSION THERAPY VISIT (OUTPATIENT)
Dept: TRANSPLANT | Facility: CLINIC | Age: 24
DRG: 189 | End: 2024-04-23
Attending: REGISTERED NURSE
Payer: COMMERCIAL

## 2024-04-23 ENCOUNTER — APPOINTMENT (OUTPATIENT)
Dept: CT IMAGING | Facility: CLINIC | Age: 24
DRG: 189 | End: 2024-04-23
Attending: FAMILY MEDICINE
Payer: COMMERCIAL

## 2024-04-23 VITALS
OXYGEN SATURATION: 97 % | TEMPERATURE: 98.4 F | RESPIRATION RATE: 14 BRPM | HEART RATE: 70 BPM | DIASTOLIC BLOOD PRESSURE: 60 MMHG | SYSTOLIC BLOOD PRESSURE: 96 MMHG

## 2024-04-23 DIAGNOSIS — Z86.73 HISTORY OF STROKE: ICD-10-CM

## 2024-04-23 DIAGNOSIS — J45.901 MODERATE ASTHMA WITH EXACERBATION, UNSPECIFIED WHETHER PERSISTENT: ICD-10-CM

## 2024-04-23 DIAGNOSIS — M54.9 CHRONIC BILATERAL BACK PAIN, UNSPECIFIED BACK LOCATION: ICD-10-CM

## 2024-04-23 DIAGNOSIS — R09.02 HYPOXIA: ICD-10-CM

## 2024-04-23 DIAGNOSIS — G81.10 SPASTIC HEMIPLEGIA, UNSPECIFIED ETIOLOGY, UNSPECIFIED LATERALITY (H): ICD-10-CM

## 2024-04-23 DIAGNOSIS — D57.1 HB-SS DISEASE WITHOUT CRISIS (H): Primary | ICD-10-CM

## 2024-04-23 DIAGNOSIS — G89.29 CHRONIC BILATERAL BACK PAIN, UNSPECIFIED BACK LOCATION: ICD-10-CM

## 2024-04-23 DIAGNOSIS — D57.00 SICKLE CELL PAIN CRISIS (H): ICD-10-CM

## 2024-04-23 DIAGNOSIS — D57.00 SICKLE CELL DISEASE WITH CRISIS (H): ICD-10-CM

## 2024-04-23 DIAGNOSIS — I63.9 CEREBRAL INFARCTION, UNSPECIFIED MECHANISM (H): ICD-10-CM

## 2024-04-23 DIAGNOSIS — I69.351 HEMIPARESIS AFFECTING RIGHT SIDE AS LATE EFFECT OF CEREBROVASCULAR ACCIDENT (H): Primary | ICD-10-CM

## 2024-04-23 LAB
ALBUMIN SERPL BCG-MCNC: 4.2 G/DL (ref 3.5–5.2)
ALBUMIN UR-MCNC: NEGATIVE MG/DL
ALP SERPL-CCNC: 60 U/L (ref 40–150)
ALT SERPL W P-5'-P-CCNC: 21 U/L (ref 0–50)
ANION GAP SERPL CALCULATED.3IONS-SCNC: 9 MMOL/L (ref 7–15)
APPEARANCE UR: CLEAR
APTT PPP: 26 SECONDS (ref 22–38)
AST SERPL W P-5'-P-CCNC: 55 U/L (ref 0–45)
ATRIAL RATE - MUSE: 78 BPM
BACTERIA #/AREA URNS HPF: ABNORMAL /HPF
BASOPHILS # BLD AUTO: 0.2 10E3/UL (ref 0–0.2)
BASOPHILS NFR BLD AUTO: 1 %
BILIRUB SERPL-MCNC: 3.6 MG/DL
BILIRUB UR QL STRIP: NEGATIVE
BUN SERPL-MCNC: 4.7 MG/DL (ref 6–20)
CALCIUM SERPL-MCNC: 8.6 MG/DL (ref 8.6–10)
CHLORIDE SERPL-SCNC: 102 MMOL/L (ref 98–107)
COLOR UR AUTO: YELLOW
CREAT SERPL-MCNC: 0.53 MG/DL (ref 0.51–0.95)
CREAT SERPL-MCNC: 0.53 MG/DL (ref 0.51–0.95)
CRP SERPL-MCNC: <3 MG/L
DEPRECATED HCO3 PLAS-SCNC: 25 MMOL/L (ref 22–29)
DIASTOLIC BLOOD PRESSURE - MUSE: NORMAL MMHG
EGFRCR SERPLBLD CKD-EPI 2021: >90 ML/MIN/1.73M2
EGFRCR SERPLBLD CKD-EPI 2021: >90 ML/MIN/1.73M2
EOSINOPHIL # BLD AUTO: 0.5 10E3/UL (ref 0–0.7)
EOSINOPHIL NFR BLD AUTO: 5 %
ERYTHROCYTE [DISTWIDTH] IN BLOOD BY AUTOMATED COUNT: 21.3 % (ref 10–15)
FLUAV RNA SPEC QL NAA+PROBE: NEGATIVE
FLUBV RNA RESP QL NAA+PROBE: NEGATIVE
GLUCOSE SERPL-MCNC: 77 MG/DL (ref 70–99)
GLUCOSE UR STRIP-MCNC: NEGATIVE MG/DL
HCG SERPL QL: NEGATIVE
HCT VFR BLD AUTO: 22.2 % (ref 35–47)
HGB BLD-MCNC: 8.1 G/DL (ref 11.7–15.7)
HGB UR QL STRIP: NEGATIVE
IMM GRANULOCYTES # BLD: 0 10E3/UL
IMM GRANULOCYTES NFR BLD: 0 %
INR PPP: 1.25 (ref 0.85–1.15)
INTERPRETATION ECG - MUSE: NORMAL
KETONES UR STRIP-MCNC: NEGATIVE MG/DL
LACTATE SERPL-SCNC: 0.5 MMOL/L (ref 0.7–2)
LEUKOCYTE ESTERASE UR QL STRIP: NEGATIVE
LIPASE SERPL-CCNC: 14 U/L (ref 13–60)
LYMPHOCYTES # BLD AUTO: 2.2 10E3/UL (ref 0.8–5.3)
LYMPHOCYTES NFR BLD AUTO: 20 %
MCH RBC QN AUTO: 30.8 PG (ref 26.5–33)
MCHC RBC AUTO-ENTMCNC: 36.5 G/DL (ref 31.5–36.5)
MCV RBC AUTO: 84 FL (ref 78–100)
MONOCYTES # BLD AUTO: 0.8 10E3/UL (ref 0–1.3)
MONOCYTES NFR BLD AUTO: 8 %
MUCOUS THREADS #/AREA URNS LPF: PRESENT /LPF
NEUTROPHILS # BLD AUTO: 7.1 10E3/UL (ref 1.6–8.3)
NEUTROPHILS NFR BLD AUTO: 66 %
NITRATE UR QL: NEGATIVE
NRBC # BLD AUTO: 0.3 10E3/UL
NRBC BLD AUTO-RTO: 3 /100
NT-PROBNP SERPL-MCNC: 161 PG/ML (ref 0–450)
P AXIS - MUSE: 67 DEGREES
PH UR STRIP: 6.5 [PH] (ref 5–7)
PLATELET # BLD AUTO: 369 10E3/UL (ref 150–450)
POTASSIUM SERPL-SCNC: 3.8 MMOL/L (ref 3.4–5.3)
PR INTERVAL - MUSE: 170 MS
PROT SERPL-MCNC: 6.8 G/DL (ref 6.4–8.3)
QRS DURATION - MUSE: 72 MS
QT - MUSE: 404 MS
QTC - MUSE: 460 MS
R AXIS - MUSE: 42 DEGREES
RBC # BLD AUTO: 2.63 10E6/UL (ref 3.8–5.2)
RBC URINE: 2 /HPF
RSV RNA SPEC NAA+PROBE: NEGATIVE
SARS-COV-2 RNA RESP QL NAA+PROBE: NEGATIVE
SODIUM SERPL-SCNC: 136 MMOL/L (ref 135–145)
SP GR UR STRIP: 1.01 (ref 1–1.03)
SQUAMOUS EPITHELIAL: 2 /HPF
SYSTOLIC BLOOD PRESSURE - MUSE: NORMAL MMHG
T AXIS - MUSE: 31 DEGREES
TROPONIN T SERPL HS-MCNC: <6 NG/L
TSH SERPL DL<=0.005 MIU/L-ACNC: 0.6 UIU/ML (ref 0.3–4.2)
UROBILINOGEN UR STRIP-MCNC: 3 MG/DL
VENTRICULAR RATE- MUSE: 78 BPM
WBC # BLD AUTO: 10.9 10E3/UL (ref 4–11)
WBC URINE: 4 /HPF

## 2024-04-23 PROCEDURE — 71250 CT THORAX DX C-: CPT

## 2024-04-23 PROCEDURE — 93308 TTE F-UP OR LMTD: CPT | Performed by: FAMILY MEDICINE

## 2024-04-23 PROCEDURE — 36430 TRANSFUSION BLD/BLD COMPNT: CPT

## 2024-04-23 PROCEDURE — 93010 ELECTROCARDIOGRAM REPORT: CPT | Mod: 59 | Performed by: FAMILY MEDICINE

## 2024-04-23 PROCEDURE — 85610 PROTHROMBIN TIME: CPT | Performed by: FAMILY MEDICINE

## 2024-04-23 PROCEDURE — 96375 TX/PRO/DX INJ NEW DRUG ADDON: CPT

## 2024-04-23 PROCEDURE — 96361 HYDRATE IV INFUSION ADD-ON: CPT | Performed by: FAMILY MEDICINE

## 2024-04-23 PROCEDURE — 120N000002 HC R&B MED SURG/OB UMMC

## 2024-04-23 PROCEDURE — 85730 THROMBOPLASTIN TIME PARTIAL: CPT | Performed by: FAMILY MEDICINE

## 2024-04-23 PROCEDURE — 250N000011 HC RX IP 250 OP 636: Performed by: FAMILY MEDICINE

## 2024-04-23 PROCEDURE — 85025 COMPLETE CBC W/AUTO DIFF WBC: CPT | Performed by: FAMILY MEDICINE

## 2024-04-23 PROCEDURE — 250N000011 HC RX IP 250 OP 636: Performed by: INTERNAL MEDICINE

## 2024-04-23 PROCEDURE — 96374 THER/PROPH/DIAG INJ IV PUSH: CPT

## 2024-04-23 PROCEDURE — 84443 ASSAY THYROID STIM HORMONE: CPT | Performed by: FAMILY MEDICINE

## 2024-04-23 PROCEDURE — 83605 ASSAY OF LACTIC ACID: CPT | Performed by: FAMILY MEDICINE

## 2024-04-23 PROCEDURE — 86140 C-REACTIVE PROTEIN: CPT | Performed by: FAMILY MEDICINE

## 2024-04-23 PROCEDURE — 96374 THER/PROPH/DIAG INJ IV PUSH: CPT | Performed by: FAMILY MEDICINE

## 2024-04-23 PROCEDURE — 84484 ASSAY OF TROPONIN QUANT: CPT | Performed by: FAMILY MEDICINE

## 2024-04-23 PROCEDURE — 93005 ELECTROCARDIOGRAM TRACING: CPT | Mod: 59 | Performed by: FAMILY MEDICINE

## 2024-04-23 PROCEDURE — 250N000011 HC RX IP 250 OP 636: Performed by: PEDIATRICS

## 2024-04-23 PROCEDURE — 258N000003 HC RX IP 258 OP 636: Performed by: PEDIATRICS

## 2024-04-23 PROCEDURE — 87637 SARSCOV2&INF A&B&RSV AMP PRB: CPT | Performed by: FAMILY MEDICINE

## 2024-04-23 PROCEDURE — 99223 1ST HOSP IP/OBS HIGH 75: CPT | Performed by: INTERNAL MEDICINE

## 2024-04-23 PROCEDURE — P9016 RBC LEUKOCYTES REDUCED: HCPCS | Performed by: PEDIATRICS

## 2024-04-23 PROCEDURE — 250N000009 HC RX 250: Performed by: FAMILY MEDICINE

## 2024-04-23 PROCEDURE — 99285 EMERGENCY DEPT VISIT HI MDM: CPT | Mod: 25 | Performed by: FAMILY MEDICINE

## 2024-04-23 PROCEDURE — 250N000013 HC RX MED GY IP 250 OP 250 PS 637: Performed by: PEDIATRICS

## 2024-04-23 PROCEDURE — 83880 ASSAY OF NATRIURETIC PEPTIDE: CPT | Performed by: FAMILY MEDICINE

## 2024-04-23 PROCEDURE — 250N000013 HC RX MED GY IP 250 OP 250 PS 637: Performed by: INTERNAL MEDICINE

## 2024-04-23 PROCEDURE — 81001 URINALYSIS AUTO W/SCOPE: CPT | Performed by: FAMILY MEDICINE

## 2024-04-23 PROCEDURE — 258N000003 HC RX IP 258 OP 636: Performed by: FAMILY MEDICINE

## 2024-04-23 PROCEDURE — 258N000003 HC RX IP 258 OP 636

## 2024-04-23 PROCEDURE — 80053 COMPREHEN METABOLIC PANEL: CPT | Performed by: FAMILY MEDICINE

## 2024-04-23 PROCEDURE — 71046 X-RAY EXAM CHEST 2 VIEWS: CPT

## 2024-04-23 PROCEDURE — 96376 TX/PRO/DX INJ SAME DRUG ADON: CPT

## 2024-04-23 PROCEDURE — 84703 CHORIONIC GONADOTROPIN ASSAY: CPT | Performed by: FAMILY MEDICINE

## 2024-04-23 PROCEDURE — 83690 ASSAY OF LIPASE: CPT | Performed by: FAMILY MEDICINE

## 2024-04-23 PROCEDURE — 93308 TTE F-UP OR LMTD: CPT | Mod: 26 | Performed by: FAMILY MEDICINE

## 2024-04-23 PROCEDURE — 36415 COLL VENOUS BLD VENIPUNCTURE: CPT | Performed by: FAMILY MEDICINE

## 2024-04-23 RX ORDER — KETOROLAC TROMETHAMINE 30 MG/ML
30 INJECTION, SOLUTION INTRAMUSCULAR; INTRAVENOUS ONCE
Status: CANCELLED
Start: 2024-07-01 | End: 2024-07-01

## 2024-04-23 RX ORDER — ENOXAPARIN SODIUM 100 MG/ML
40 INJECTION SUBCUTANEOUS EVERY 24 HOURS
Status: DISCONTINUED | OUTPATIENT
Start: 2024-04-24 | End: 2024-04-23

## 2024-04-23 RX ORDER — AMOXICILLIN 250 MG
1 CAPSULE ORAL 2 TIMES DAILY PRN
Status: DISCONTINUED | OUTPATIENT
Start: 2024-04-23 | End: 2024-04-29 | Stop reason: HOSPADM

## 2024-04-23 RX ORDER — ONDANSETRON 2 MG/ML
4 INJECTION INTRAMUSCULAR; INTRAVENOUS EVERY 30 MIN PRN
Status: DISCONTINUED | OUTPATIENT
Start: 2024-04-23 | End: 2024-04-23

## 2024-04-23 RX ORDER — ENOXAPARIN SODIUM 100 MG/ML
40 INJECTION SUBCUTANEOUS EVERY 24 HOURS
Status: DISCONTINUED | OUTPATIENT
Start: 2024-04-23 | End: 2024-04-23

## 2024-04-23 RX ORDER — SODIUM CHLORIDE 9 MG/ML
INJECTION, SOLUTION INTRAVENOUS CONTINUOUS
Status: DISCONTINUED | OUTPATIENT
Start: 2024-04-23 | End: 2024-04-26

## 2024-04-23 RX ORDER — IPRATROPIUM BROMIDE AND ALBUTEROL SULFATE 2.5; .5 MG/3ML; MG/3ML
3 SOLUTION RESPIRATORY (INHALATION) ONCE
Status: COMPLETED | OUTPATIENT
Start: 2024-04-23 | End: 2024-04-23

## 2024-04-23 RX ORDER — ONDANSETRON 2 MG/ML
8 INJECTION INTRAMUSCULAR; INTRAVENOUS EVERY 6 HOURS PRN
Status: CANCELLED
Start: 2024-07-01

## 2024-04-23 RX ORDER — LIDOCAINE 40 MG/G
CREAM TOPICAL
Status: DISCONTINUED | OUTPATIENT
Start: 2024-04-23 | End: 2024-04-24

## 2024-04-23 RX ORDER — ONDANSETRON 2 MG/ML
4 INJECTION INTRAMUSCULAR; INTRAVENOUS EVERY 6 HOURS PRN
Status: DISCONTINUED | OUTPATIENT
Start: 2024-04-23 | End: 2024-04-29 | Stop reason: HOSPADM

## 2024-04-23 RX ORDER — ACETAMINOPHEN 325 MG/1
975 TABLET ORAL EVERY 8 HOURS
Status: DISCONTINUED | OUTPATIENT
Start: 2024-04-23 | End: 2024-04-29 | Stop reason: HOSPADM

## 2024-04-23 RX ORDER — IPRATROPIUM BROMIDE AND ALBUTEROL SULFATE 2.5; .5 MG/3ML; MG/3ML
3 SOLUTION RESPIRATORY (INHALATION) EVERY 4 HOURS PRN
Status: DISCONTINUED | OUTPATIENT
Start: 2024-04-24 | End: 2024-04-29 | Stop reason: HOSPADM

## 2024-04-23 RX ORDER — DIPHENHYDRAMINE HCL 25 MG
50 CAPSULE ORAL
Status: CANCELLED
Start: 2024-07-01

## 2024-04-23 RX ORDER — HEPARIN SODIUM (PORCINE) LOCK FLUSH IV SOLN 100 UNIT/ML 100 UNIT/ML
5 SOLUTION INTRAVENOUS
Status: CANCELLED | OUTPATIENT
Start: 2024-07-01

## 2024-04-23 RX ORDER — ONDANSETRON 2 MG/ML
8 INJECTION INTRAMUSCULAR; INTRAVENOUS EVERY 6 HOURS PRN
Status: DISCONTINUED | OUTPATIENT
Start: 2024-04-23 | End: 2024-04-23 | Stop reason: HOSPADM

## 2024-04-23 RX ORDER — CALCIUM CARBONATE 500 MG/1
1000 TABLET, CHEWABLE ORAL 4 TIMES DAILY PRN
Status: DISCONTINUED | OUTPATIENT
Start: 2024-04-23 | End: 2024-04-29 | Stop reason: HOSPADM

## 2024-04-23 RX ORDER — DIPHENHYDRAMINE HCL 25 MG
50 CAPSULE ORAL
Status: COMPLETED | OUTPATIENT
Start: 2024-04-23 | End: 2024-04-23

## 2024-04-23 RX ORDER — ONDANSETRON 4 MG/1
4 TABLET, ORALLY DISINTEGRATING ORAL EVERY 6 HOURS PRN
Status: DISCONTINUED | OUTPATIENT
Start: 2024-04-23 | End: 2024-04-29 | Stop reason: HOSPADM

## 2024-04-23 RX ORDER — HEPARIN SODIUM,PORCINE 10 UNIT/ML
5 VIAL (ML) INTRAVENOUS
Status: CANCELLED | OUTPATIENT
Start: 2024-07-01

## 2024-04-23 RX ORDER — SODIUM CHLORIDE 9 MG/ML
INJECTION, SOLUTION INTRAVENOUS
Status: COMPLETED
Start: 2024-04-23 | End: 2024-04-23

## 2024-04-23 RX ORDER — AMOXICILLIN 250 MG
2 CAPSULE ORAL 2 TIMES DAILY PRN
Status: DISCONTINUED | OUTPATIENT
Start: 2024-04-23 | End: 2024-04-29 | Stop reason: HOSPADM

## 2024-04-23 RX ORDER — LIDOCAINE 40 MG/G
CREAM TOPICAL
Status: DISCONTINUED | OUTPATIENT
Start: 2024-04-23 | End: 2024-04-29 | Stop reason: HOSPADM

## 2024-04-23 RX ORDER — ONDANSETRON 4 MG/1
8 TABLET, FILM COATED ORAL
Status: CANCELLED
Start: 2024-07-01

## 2024-04-23 RX ADMIN — HYDROMORPHONE HYDROCHLORIDE 1 MG: 1 INJECTION, SOLUTION INTRAMUSCULAR; INTRAVENOUS; SUBCUTANEOUS at 09:13

## 2024-04-23 RX ADMIN — SODIUM CHLORIDE: 9 INJECTION, SOLUTION INTRAVENOUS at 23:28

## 2024-04-23 RX ADMIN — HYDROMORPHONE HYDROCHLORIDE 1 MG: 1 INJECTION, SOLUTION INTRAMUSCULAR; INTRAVENOUS; SUBCUTANEOUS at 22:28

## 2024-04-23 RX ADMIN — SODIUM CHLORIDE, POTASSIUM CHLORIDE, SODIUM LACTATE AND CALCIUM CHLORIDE 1000 ML: 600; 310; 30; 20 INJECTION, SOLUTION INTRAVENOUS at 10:58

## 2024-04-23 RX ADMIN — HYDROMORPHONE HYDROCHLORIDE 1 MG: 1 INJECTION, SOLUTION INTRAMUSCULAR; INTRAVENOUS; SUBCUTANEOUS at 11:54

## 2024-04-23 RX ADMIN — IPRATROPIUM BROMIDE AND ALBUTEROL SULFATE 3 ML: .5; 3 SOLUTION RESPIRATORY (INHALATION) at 21:36

## 2024-04-23 RX ADMIN — DIPHENHYDRAMINE HYDROCHLORIDE 50 MG: 25 CAPSULE ORAL at 09:00

## 2024-04-23 RX ADMIN — SODIUM CHLORIDE 1000 ML: 9 INJECTION, SOLUTION INTRAVENOUS at 17:57

## 2024-04-23 RX ADMIN — HYDROMORPHONE HYDROCHLORIDE 1 MG: 1 INJECTION, SOLUTION INTRAMUSCULAR; INTRAVENOUS; SUBCUTANEOUS at 20:02

## 2024-04-23 RX ADMIN — ACETAMINOPHEN 975 MG: 325 TABLET, FILM COATED ORAL at 23:05

## 2024-04-23 RX ADMIN — HYDROMORPHONE HYDROCHLORIDE 1 MG: 1 INJECTION, SOLUTION INTRAMUSCULAR; INTRAVENOUS; SUBCUTANEOUS at 10:41

## 2024-04-23 RX ADMIN — ONDANSETRON 8 MG: 2 INJECTION INTRAMUSCULAR; INTRAVENOUS at 09:00

## 2024-04-23 ASSESSMENT — ACTIVITIES OF DAILY LIVING (ADL)
ADLS_ACUITY_SCORE: 37

## 2024-04-23 ASSESSMENT — ENCOUNTER SYMPTOMS: SHORTNESS OF BREATH: 1

## 2024-04-23 ASSESSMENT — PAIN SCALES - GENERAL: PAINLEVEL: EXTREME PAIN (9)

## 2024-04-23 NOTE — ED NOTES
Nuclear medicine called regarding pt needing PIV access for testing, critical care RN called and will attempt access while pt down in nuclear medicine.

## 2024-04-23 NOTE — ED NOTES
Bed: ED19  Expected date:   Expected time:   Means of arrival:   Comments:  Hold [facility] 24 M /general sickle cell pain/lethargic

## 2024-04-23 NOTE — ED NOTES
Pt requesting to be discharged with port still accessed for infusion appointment tomorrow. MD notified and OK with patient discharged with accessed port.

## 2024-04-23 NOTE — ED NOTES
Pt given dose of dilaudid per MD order, Pt's O2 sats dropped to 83%. Pt placed on 3L O2 with improvement to 92% and MD notified.

## 2024-04-23 NOTE — DISCHARGE INSTRUCTIONS
Discharge to home with plans to return tomorrow for scheduled transfusion as discussed or return if marked increase in symptoms

## 2024-04-23 NOTE — ED TRIAGE NOTES
Triage Assessment (Adult)       Row Name 04/23/24 0813          Triage Assessment    Airway WDL WDL        Respiratory WDL    Respiratory WDL X;rhythm/pattern     Rhythm/Pattern, Respiratory shortness of breath        Skin Circulation/Temperature WDL    Skin Circulation/Temperature WDL WDL        Cardiac WDL    Cardiac WDL WDL        Peripheral/Neurovascular WDL    Peripheral Neurovascular WDL WDL        Cognitive/Neuro/Behavioral WDL    Cognitive/Neuro/Behavioral WDL WDL

## 2024-04-23 NOTE — PROGRESS NOTES
Infusion Nursing Note:  Jennifer Cervantes presents today for RBCs, add on IVF and pain meds.    Patient seen by provider today: No   present during visit today: Not Applicable.    Note: No labs/no provider visit. Jennifer presents ill appearing today. She is in a wheelchair d/t weakness, drowsy, and c/o n/v and 8/10 generalized pain. Had 1 episode of vomiting upon arrival, given 8mg IVP Zofran - n/v resolved. Patient is hypoxic on RA, satting 87%. Patient placed on 2L NC, satting 97%, but drops if O2 is taken off. C/o 8/10 pain all over, feels very sluggish and weak. Approved for pain management protocol per ANDREAS Mantilla. Given 1mg IVP Dilaudid Q1HR for max of 3 doses. 1unit RBCs transfused, VSS throughout. 1L LR given over 2 hours. Attempted to wean patient off of O2, but sats continued to hold at 88-89%. RN contacted ANDREAS Mantilla regarding O2 saturation and it was recommended if patient could not maintain O2 sats 90% or greater to report to ED. Jennifer was advised to go ED and was transported to [facility] via [facility] transport.    Intravenous Access:  Implanted Port.  +BR noted pre and post infusion  Left accessed for ED visit    Treatment Conditions:  Hgb 6.1 on 4/22; Approved for IVF and pain meds by ANDREAS Mantilla    Post Infusion Assessment:  Infusion completed without complication. Patient states she is feeling better, no more nausea/vomiting, still feeling sluggish and weak, patient still hypoxic on RA.    Discharge Plan:   Patient discharged in stable condition in the care of [facility] transport, reporting to [facility] ED.    Allison Wiggins RN

## 2024-04-23 NOTE — ED PROVIDER NOTES
[facility] [place]    4/23/24      ED PROVIDER NOTE   ED 19  History     Chief Complaint   Patient presents with    Shortness of Breath     Pt went to clinic yesterday for hypoxia, admitted to hospital yesterday and discharged. Pt went back to clinic with hypoxia today, sent to ER. 6/10 pain, good sats on 2L     The history is provided by the patient and medical records.   Shortness of Breath    Jennifer Cervantes is a 24 year old female with history of sickle cell disease, spastic hemiplegia who presents with sickle cell pain flare as well as low O2 sats.  Patient was seen at infusion center yesterday as an add-on for IV fluids and pain medications.  She reported 8/10 pain in her back and shoulders.  She was noted to be slightly hypoxic to 88-89 on room air.  On lab work she was found to have a hemoglobin of 6.1.  Throughout her infusion clinic visit yesterday her O2 sats were consistently 85-86% on room air.  Dr. Duncan recommended patient come to the emergency department for PT rule out, and she did this and was seen by Dr. Young who ordered a nuc med scan.  This was negative for acute PE.  She returned to the infusion center today as an add-on for IV fluids and pain medications.  She was noted to appear ill with drowsiness, 8/10 generalized pain.  She again was hypoxic on room air, satting 87% and had nausea and vomiting x 1.  She did improve to 97% on 2 L via nasal cannula but would become hypoxic again when the oxygen was discontinued.  She was given Dilaudid 1 mg x 3, 8 mg Zofran and a 1 unit of RBCs without symptomatic improvement. They tried to wean her off of O2 but she continued to have O2 sats of 88-89%.  She was sent to the ED as she cannot maintain O2 sats of 90% or greater. Here she continues to have sickle cell pain, shortness of breath and nausea. No fevers, leg pain or swelling.     Past Medical History  Past Medical History:   Diagnosis Date    Anxiety      Bleeding disorder (H24)     Blood clotting disorder (H24)     Cerebral infarction (H) 2015    Cognitive developmental delay     low IQ. 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 side (H)     right hand contractures    Iron overload due to repeated red blood cell transfusions     Migraines     Multiple subsegmental pulmonary emboli without acute cor pulmonale (H) 02/01/2021    Oppositional defiant behavior     Presence of intrauterine contraceptive device 2/18/2020    Superficial venous thrombosis of arm, right 03/25/2021    Uncomplicated asthma      Past Surgical History:   Procedure Laterality Date    AS INSERT TUNNELED CV 2 CATH W/O PORT/PUMP      CHOLECYSTECTOMY      CV RIGHT HEART CATH MEASUREMENTS RECORDED N/A 11/18/2021    Procedure: Right Heart Cath;  Surgeon: Jackson Stauffer MD;  Location:  HEART CARDIAC CATH LAB    INSERT PORT VASCULAR ACCESS Left 4/21/2021    Procedure: INSERTION, VASCULAR ACCESS PORT (NOT SURE ON SIDE UNTIL REMOVAL);  Surgeon: Rajan More MD;  Location: [facility] OR    IR CHEST PORT PLACEMENT > 5 YRS OF AGE  4/21/2021    IR CVC NON TUNNEL LINE REMOVAL  5/6/2021    IR CVC NON TUNNEL PLACEMENT > 5 YRS  04/07/2020    IR CVC NON TUNNEL PLACEMENT > 5 YRS  4/30/2021    IR CVC NON TUNNEL PLACEMENT > 5 YRS  9/7/2022    IR PORT REMOVAL LEFT  4/21/2021    REMOVE PORT VASCULAR ACCESS Left 4/21/2021    Procedure: REMOVAL, VASCULAR ACCESS PORT LEFT;  Surgeon: Rajan More MD;  Location: [facility] OR    REPAIR TENDON ELBOW Right 10/02/2019    Procedure: Right Elbow Flexor Lengthening, Flexor Pronator Slide Of Wrist and Finger, Thumb Adductor Lengthening;  Surgeon: Anai Franco MD;  Location: [facility] OR    TONSILLECTOMY Bilateral 10/02/2019    Procedure: Bilateral Tonsillectomy;  Surgeon: Farhana Guy MD;  Location: [facility] OR    ZZC BREAST REDUCTION (INCLUDES LIPO)  TIER 3 Bilateral 04/23/2019     No current outpatient medications on file.    Allergies   Allergen Reactions    Contrast Dye      Hives and breathing issues    Fish-Derived Products Hives    Seafood Hives    Adhesive Tape Hives     Primipore dressing causes hives    Gadolinium     Iodinated Contrast Media      Family History  Family History   Problem Relation Age of Onset    Sickle Cell Trait Mother     Hypertension Mother     Asthma Mother     Sickle Cell Trait Father     Glaucoma No family hx of     Macular Degeneration No family hx of     Diabetes No family hx of     Gout No family hx of      Social History   Social History     Tobacco Use    Smoking status: Never     Passive exposure: Never    Smokeless tobacco: Never   Substance Use Topics    Alcohol use: Not Currently     Alcohol/week: 0.0 standard drinks of alcohol    Drug use: Never         A medically appropriate review of systems was performed with pertinent positives and negatives noted in the HPI, and all other systems negative.    Physical Exam   BP: 113/67  Pulse: 72  Temp: 98  F (36.7  C)  Resp: 16  SpO2: 96 %  Physical Exam  Vitals and nursing note reviewed.   Constitutional:       General: She is in acute distress.      Appearance: Normal appearance. She is well-developed. She is not toxic-appearing.      Comments: Patient probably stable otherwise hears on 2 L O2 she is typically not.  Sats are 98% she is not tachycardic blood pressure stable afebrile here.  She has no respiratory distress at this point.  Does have a Port-A-Cath that was accessed already.   HENT:      Head: Normocephalic and atraumatic.      Nose: Nose normal.      Mouth/Throat:      Mouth: Mucous membranes are moist.      Pharynx: Oropharynx is clear.   Eyes:      General: Scleral icterus present.      Extraocular Movements: Extraocular movements intact.      Pupils: Pupils are equal, round, and reactive to light.      Comments: Consistent with sickle cell   Cardiovascular:       Rate and Rhythm: Normal rate and regular rhythm.   Pulmonary:      Effort: Pulmonary effort is normal. No respiratory distress.      Breath sounds: No stridor. No wheezing.   Abdominal:      General: Abdomen is flat.      Palpations: Abdomen is soft.      Tenderness: There is no abdominal tenderness. There is no guarding or rebound.   Musculoskeletal:         General: No swelling or tenderness.      Cervical back: Normal range of motion and neck supple. No rigidity.      Comments: Negative Homans' sign   Skin:     General: Skin is warm and dry.      Capillary Refill: Capillary refill takes less than 2 seconds.      Findings: No rash.   Neurological:      General: No focal deficit present.      Mental Status: She is alert and oriented to person, place, and time. Mental status is at baseline.   Psychiatric:      Comments: Appropriate here in the ER           ED Course, Procedures, & Data          Records reviewed in epic patient with nuclear medicine study that was negative yesterday as she has contrast allergy.  Also chest x-ray negative yesterday.  Other lab testing reviewed etc.  Recommendations etc. also reviewed infusion clinics note today also as noted in HPI.  Patient with anemia also did receive a unit of blood yesterday.  Hemoglobin 6.1.    In the ER we will recheck COVID influenza RSV get another EKG which showed no hyperacute ischemic changes no tachycardia noted below.  Will check another chest x-ray to make sure there has not been any sudden change at all with her symptoms.  Will also check other basic labs cardiac markers etc. and will reassess.  This point patient has history of asthma but does not feel like an asthma component.    Bedside ultrasound done by myself echo without any pericardial effusion valvular function appears normal no dilation etc.    EKG was done revealing normal sinus rhythm without ischemic changes.  Chest x-ray done reviewed by myself without any acute infiltrates or effusions  and pneumothorax etc.  COVID influenza RSV were negative.  Thyroid functions within normal limits lipase is 14 BNP is 161 troponin is negative.  Urinalysis negative negative pregnancy test INR 1.25.  Lactic acid 0.5 sodium 136 potassium 3.8.  Bicarb 25 gap is 9 glucose 77 total bilirubin 3.6.  AST is 55 white count 10.9 hemoglobin 8.1 which is up from 6.1 yesterday.    Patient was having pain but not chest pain more of her sickle cell pain generalized achiness did receive 1 mg of Dilaudid IV here in the ER.  Somewhat improved.  IV fluids were given continued oxygen therapy here in the ER we have taken her off and she dropped her sats to the low 80s continue to place her on oxygen I talked to hematology also will order a noncontrast CT of the chest also give a DuoNeb as she has some reactive airway issues but we will plan admit to medicine I talked to them they agree with plan patient agrees also will be admitted to medicine.    CT scan of the chest reviewed by radiology did not reveal any acute abnormalities.    Patient admitted to medicine for sickle cell disease with unclear etiology of hypoxia.    Procedures  Results for orders placed during the hospital encounter of 04/23/24    POC US ECHO LIMITED    Impression  Limited Bedside Cardiac Ultrasound, performed and interpreted by me.  Indication: Shortness of Breath.  Parasternal long axis and parasternal short axis views were acquired.  Image quality was satisfactory.    Findings:  Global left ventricular function appears intact.  Chambers do not appear dilated.  There is no evidence of free fluid within the pericardium.    IMPRESSION: Grossly normal left ventricular function and chamber size.  No pericardial effusion..                  EKG Interpretation:      Interpreted by Pedro Ryan MD  Time reviewed: 1745   Symptoms at time of EKG: shortness of breath   Rhythm: normal sinus   Rate: 78 BPM  Axis: NORMAL  Ectopy: none  Conduction: normal  ST Segments/ T  Waves: No ST-T wave changes  Q Waves: none  Comparison to prior: Unchanged    Clinical Impression: normal EKG.       Results for orders placed or performed during the hospital encounter of 04/23/24   XR Chest 2 Views     Status: None    Narrative    EXAM: XR CHEST 2 VIEWS  LOCATION: [facility]  DATE: 4/23/2024    INDICATION: hypoxia    COMPARISON: 04/22/2024      Impression    IMPRESSION: Lungs are clear. No pleural effusion or pneumothorax. Normal heart size. Stable left-sided port.     POC US ECHO LIMITED     Status: None    Impression    Limited Bedside Cardiac Ultrasound, performed and interpreted by me.   Indication: Shortness of Breath.  Parasternal long axis and parasternal short axis views were acquired.   Image quality was satisfactory.    Findings:    Global left ventricular function appears intact.  Chambers do not appear dilated.  There is no evidence of free fluid within the pericardium.    IMPRESSION: Grossly normal left ventricular function and chamber size.  No pericardial effusion..       CT Chest w/o Contrast     Status: None    Narrative    EXAM: CT CHEST W/O CONTRAST  LOCATION: [facility]  DATE: 4/23/2024    INDICATION: hypoxia sickle cell allergic to contrast!  COMPARISON: None.  TECHNIQUE: CT chest without IV contrast. Multiplanar reformats were obtained. Dose reduction techniques were used.  CONTRAST: None.    FINDINGS:   LUNGS AND PLEURA: There is mild dependent atelectasis bilaterally. The lungs are otherwise clear. No pneumothorax or pleural effusion.    MEDIASTINUM/AXILLAE: Left chest wall port. No lymph node enlargement. The heart size is normal.    CORONARY ARTERY CALCIFICATION: None.    UPPER ABDOMEN: Small calcified spleen consistent with history of sickle cell disease.    MUSCULOSKELETAL: Unremarkable.      Impression    IMPRESSION:   1.  Mild dependent atelectasis bilaterally. No other acute  abnormality.     INR     Status: Abnormal   Result Value Ref Range    INR 1.25 (H) 0.85 - 1.15   Partial thromboplastin time     Status: Normal   Result Value Ref Range    aPTT 26 22 - 38 Seconds   CRP inflammation     Status: Normal   Result Value Ref Range    CRP Inflammation <3.00 <5.00 mg/L   Comprehensive metabolic panel     Status: Abnormal   Result Value Ref Range    Sodium 136 135 - 145 mmol/L    Potassium 3.8 3.4 - 5.3 mmol/L    Carbon Dioxide (CO2) 25 22 - 29 mmol/L    Anion Gap 9 7 - 15 mmol/L    Urea Nitrogen 4.7 (L) 6.0 - 20.0 mg/dL    Creatinine 0.53 0.51 - 0.95 mg/dL    GFR Estimate >90 >60 mL/min/1.73m2    Calcium 8.6 8.6 - 10.0 mg/dL    Chloride 102 98 - 107 mmol/L    Glucose 77 70 - 99 mg/dL    Alkaline Phosphatase 60 40 - 150 U/L    AST 55 (H) 0 - 45 U/L    ALT 21 0 - 50 U/L    Protein Total 6.8 6.4 - 8.3 g/dL    Albumin 4.2 3.5 - 5.2 g/dL    Bilirubin Total 3.6 (H) <=1.2 mg/dL   Lipase     Status: Normal   Result Value Ref Range    Lipase 14 13 - 60 U/L   Lactic acid whole blood     Status: Abnormal   Result Value Ref Range    Lactic Acid 0.5 (L) 0.7 - 2.0 mmol/L   Troponin T, High Sensitivity     Status: Normal   Result Value Ref Range    Troponin T, High Sensitivity <6 <=14 ng/L   TSH     Status: Normal   Result Value Ref Range    TSH 0.60 0.30 - 4.20 uIU/mL   Nt probnp inpatient (BNP)     Status: Normal   Result Value Ref Range    N terminal Pro BNP Inpatient 161 0 - 450 pg/mL   HCG qualitative Blood     Status: Normal   Result Value Ref Range    hCG Serum Qualitative Negative Negative   UA with Microscopic reflex to Culture     Status: Abnormal    Specimen: Urine, Clean Catch   Result Value Ref Range    Color Urine Yellow Colorless, Straw, Light Yellow, Yellow    Appearance Urine Clear Clear    Glucose Urine Negative Negative mg/dL    Bilirubin Urine Negative Negative    Ketones Urine Negative Negative mg/dL    Specific Gravity Urine 1.008 1.003 - 1.035    Blood Urine Negative Negative    pH  Urine 6.5 5.0 - 7.0    Protein Albumin Urine Negative Negative mg/dL    Urobilinogen Urine 3.0 (A) Normal, 2.0 mg/dL    Nitrite Urine Negative Negative    Leukocyte Esterase Urine Negative Negative    Bacteria Urine Few (A) None Seen /HPF    Mucus Urine Present (A) None Seen /LPF    RBC Urine 2 <=2 /HPF    WBC Urine 4 <=5 /HPF    Squamous Epithelials Urine 2 (H) <=1 /HPF    Narrative    Urine Culture not indicated   Symptomatic Influenza A/B, RSV, & SARS-CoV2 PCR (COVID-19) Nasopharyngeal     Status: Normal    Specimen: Nasopharyngeal; Swab   Result Value Ref Range    Influenza A PCR Negative Negative    Influenza B PCR Negative Negative    RSV PCR Negative Negative    SARS CoV2 PCR Negative Negative    Narrative    Testing was performed using the Xpert Xpress CoV2/Flu/RSV Assay on the One[employer] Instrument. This test should be ordered for the detection of SARS-CoV-2, influenza, and RSV viruses in individuals who meet clinical and/or epidemiological criteria. Test performance is unknown in asymptomatic patients. This test is for in vitro diagnostic use under the FDA EUA for laboratories certified under CLIA to perform high or moderate complexity testing. This test has not been FDA cleared or approved. A negative result does not rule out the presence of PCR inhibitors in the specimen or target RNA in concentration below the limit of detection for the assay. If only one viral target is positive but coinfection with multiple targets is suspected, the sample should be re-tested with another FDA cleared, approved, or authorized test, if coinfection would change clinical management. This test was validated by the [facility]. These laboratories are certified under the Clinical Laboratory Improvement Amendments of 1988 (CLIA-88) as qualified to perform high complexity laboratory testing.   CBC with platelets and differential     Status: Abnormal   Result Value Ref Range    WBC Count 10.9 4.0 -  11.0 10e3/uL    RBC Count 2.63 (L) 3.80 - 5.20 10e6/uL    Hemoglobin 8.1 (L) 11.7 - 15.7 g/dL    Hematocrit 22.2 (L) 35.0 - 47.0 %    MCV 84 78 - 100 fL    MCH 30.8 26.5 - 33.0 pg    MCHC 36.5 31.5 - 36.5 g/dL    RDW 21.3 (H) 10.0 - 15.0 %    Platelet Count 369 150 - 450 10e3/uL    % Neutrophils 66 %    % Lymphocytes 20 %    % Monocytes 8 %    % Eosinophils 5 %    % Basophils 1 %    % Immature Granulocytes 0 %    NRBCs per 100 WBC 3 (H) <1 /100    Absolute Neutrophils 7.1 1.6 - 8.3 10e3/uL    Absolute Lymphocytes 2.2 0.8 - 5.3 10e3/uL    Absolute Monocytes 0.8 0.0 - 1.3 10e3/uL    Absolute Eosinophils 0.5 0.0 - 0.7 10e3/uL    Absolute Basophils 0.2 0.0 - 0.2 10e3/uL    Absolute Immature Granulocytes 0.0 <=0.4 10e3/uL    Absolute NRBCs 0.3 10e3/uL   Creatinine     Status: Normal   Result Value Ref Range    Creatinine 0.53 0.51 - 0.95 mg/dL    GFR Estimate >90 >60 mL/min/1.73m2   EKG 12-lead, tracing only     Status: None   Result Value Ref Range    Systolic Blood Pressure  mmHg    Diastolic Blood Pressure  mmHg    Ventricular Rate 78 BPM    Atrial Rate 78 BPM    SC Interval 170 ms    QRS Duration 72 ms     ms    QTc 460 ms    P Axis 67 degrees    R AXIS 42 degrees    T Axis 31 degrees    Interpretation ECG       Sinus rhythm  Normal ECG  Unconfirmed report - interpretation of this ECG is computer generated - see medical record for final interpretation  Confirmed by - EMERGENCY ROOM, PHYSICIAN ([id number]),  RACHEL CARRASQUILLO ([id number]) on 4/23/2024 9:22:13 PM     CBC with platelets differential     Status: Abnormal    Narrative    The following orders were created for panel order CBC with platelets differential.  Procedure                               Abnormality         Status                     ---------                               -----------         ------                     CBC with platelets and d...[[id number]]  Abnormal            Final result                 Please view results for these tests on the  individual orders.     Medications   lidocaine 1 % 0.1-1 mL (has no administration in time range)   lidocaine (LMX4) cream (has no administration in time range)   sodium chloride (PF) 0.9% PF flush 3 mL (3 mLs Intracatheter Not Given 4/24/24 0235)   sodium chloride (PF) 0.9% PF flush 3 mL (has no administration in time range)   HYDROmorphone (DILAUDID) injection 1 mg (1 mg Intravenous $Given 4/24/24 0144)   lidocaine 1 % 0.1-1 mL (has no administration in time range)   lidocaine (LMX4) cream (has no administration in time range)   sodium chloride (PF) 0.9% PF flush 3 mL (3 mLs Intracatheter $Given 4/23/24 2306)   sodium chloride (PF) 0.9% PF flush 3 mL (has no administration in time range)   senna-docusate (SENOKOT-S/PERICOLACE) 8.6-50 MG per tablet 1 tablet (has no administration in time range)     Or   senna-docusate (SENOKOT-S/PERICOLACE) 8.6-50 MG per tablet 2 tablet (has no administration in time range)   calcium carbonate (TUMS) chewable tablet 1,000 mg (has no administration in time range)   ondansetron (ZOFRAN ODT) ODT tab 4 mg (has no administration in time range)     Or   ondansetron (ZOFRAN) injection 4 mg (has no administration in time range)   acetaminophen (TYLENOL) tablet 975 mg (975 mg Oral $Given 4/23/24 2305)   sodium chloride 0.9 % infusion ( Intravenous Rate/Dose Verify 4/24/24 0144)   ipratropium - albuterol 0.5 mg/2.5 mg/3 mL (DUONEB) neb solution 3 mL (has no administration in time range)   sodium chloride 0.9% BOLUS 1,000 mL (0 mLs Intravenous Stopped 4/23/24 1919)   HYDROmorphone (DILAUDID) injection 1 mg (1 mg Intravenous $Given 4/23/24 2002)   ipratropium - albuterol 0.5 mg/2.5 mg/3 mL (DUONEB) neb solution 3 mL (3 mLs Nebulization $Given 4/23/24 0634)     Labs Ordered and Resulted from Time of ED Arrival to Time of ED Departure   INR - Abnormal       Result Value    INR 1.25 (*)    COMPREHENSIVE METABOLIC PANEL - Abnormal    Sodium 136      Potassium 3.8      Carbon Dioxide (CO2) 25       Anion Gap 9      Urea Nitrogen 4.7 (*)     Creatinine 0.53      GFR Estimate >90      Calcium 8.6      Chloride 102      Glucose 77      Alkaline Phosphatase 60      AST 55 (*)     ALT 21      Protein Total 6.8      Albumin 4.2      Bilirubin Total 3.6 (*)    LACTIC ACID WHOLE BLOOD - Abnormal    Lactic Acid 0.5 (*)    ROUTINE UA WITH MICROSCOPIC REFLEX TO CULTURE - Abnormal    Color Urine Yellow      Appearance Urine Clear      Glucose Urine Negative      Bilirubin Urine Negative      Ketones Urine Negative      Specific Gravity Urine 1.008      Blood Urine Negative      pH Urine 6.5      Protein Albumin Urine Negative      Urobilinogen Urine 3.0 (*)     Nitrite Urine Negative      Leukocyte Esterase Urine Negative      Bacteria Urine Few (*)     Mucus Urine Present (*)     RBC Urine 2      WBC Urine 4      Squamous Epithelials Urine 2 (*)    CBC WITH PLATELETS AND DIFFERENTIAL - Abnormal    WBC Count 10.9      RBC Count 2.63 (*)     Hemoglobin 8.1 (*)     Hematocrit 22.2 (*)     MCV 84      MCH 30.8      MCHC 36.5      RDW 21.3 (*)     Platelet Count 369      % Neutrophils 66      % Lymphocytes 20      % Monocytes 8      % Eosinophils 5      % Basophils 1      % Immature Granulocytes 0      NRBCs per 100 WBC 3 (*)     Absolute Neutrophils 7.1      Absolute Lymphocytes 2.2      Absolute Monocytes 0.8      Absolute Eosinophils 0.5      Absolute Basophils 0.2      Absolute Immature Granulocytes 0.0      Absolute NRBCs 0.3     PARTIAL THROMBOPLASTIN TIME - Normal    aPTT 26     CRP INFLAMMATION - Normal    CRP Inflammation <3.00     LIPASE - Normal    Lipase 14     TROPONIN T, HIGH SENSITIVITY - Normal    Troponin T, High Sensitivity <6     TSH - Normal    TSH 0.60     NT PROBNP INPATIENT - Normal    N terminal Pro BNP Inpatient 161     HCG QUALITATIVE PREGNANCY - Normal    hCG Serum Qualitative Negative     INFLUENZA A/B, RSV, & SARS-COV2 PCR - Normal    Influenza A PCR Negative      Influenza B PCR Negative      RSV  PCR Negative      SARS CoV2 PCR Negative     CREATININE - Normal    Creatinine 0.53      GFR Estimate >90       CT Chest w/o Contrast   Final Result   IMPRESSION:    1.  Mild dependent atelectasis bilaterally. No other acute abnormality.         POC US ECHO LIMITED   Final Result   Limited Bedside Cardiac Ultrasound, performed and interpreted by me.    Indication: Shortness of Breath.   Parasternal long axis and parasternal short axis views were acquired.    Image quality was satisfactory.      Findings:     Global left ventricular function appears intact.   Chambers do not appear dilated.   There is no evidence of free fluid within the pericardium.      IMPRESSION: Grossly normal left ventricular function and chamber size.  No pericardial effusion..            XR Chest 2 Views   Final Result   IMPRESSION: Lungs are clear. No pleural effusion or pneumothorax. Normal heart size. Stable left-sided port.               Critical care was not performed.     Medical Decision Making  The patient's presentation was of high complexity (an acute health issue posing potential threat to life or bodily function).    The patient's evaluation involved:  review of external note(s) from 3+ sources (see separate area of note for details)  review of 3+ test result(s) ordered prior to this encounter (see separate area of note for details)  ordering and/or review of 3+ test(s) in this encounter (see separate area of note for details)  discussion of management or test interpretation with another health professional (see separate area of note for details)    The patient's management necessitated high risk (a parenteral controlled substance) and high risk (a decision regarding hospitalization).    Assessment & Plan   24-year-old female history of sickle cell disease with history of previous stroke concerns can Dante to thrombosis along with history of PEs in the past not current on anticoagulants has allergies to contrast dye developed  hypoxia for last few days had a VQ scan done yesterday was negative and sent home from the ER continues to have hypoxia with nausea vomiting at infusion center sent to the ER requiring O2 therapy as ox saturations were in the 80s otherwise.  No true chest pain or chest acute syndrome.  No abdominal pain patient valuated EKG sinus rhythm chest x-ray without any changes point-of-care ultrasound done by myself without any significant findings no pericardial effusion labs otherwise stable hemoglobin given unit of blood yesterday now 8.1 which is improved COVID influenza RSV are negative.  Negative pregnancy test urinalysis negative.  Unclear etiology patient given DuoNeb noncontrast chest CT was negative for any acute subtle pneumonias or effusions etc.  Patient mated to medicine for further management and workup of sickle cell disease with unclear cause of hypoxia.       I have reviewed the nursing notes. I have reviewed the findings, diagnosis, plan and need for follow up with the patient.    Current Discharge Medication List          Final diagnoses:   Hypoxia   Sickle cell disease with crisis (H)   Cerebral infarction, unspecified mechanism (H) - right side weakness hx     I, Bambi Tejeda, am serving as a trained medical scribe to document services personally performed by Pedro Ryan MD based on the provider's statements to me on April 23, 2024.  This document has been checked and approved by the attending provider.    I, Pedro Ryan MD, was physically present and have reviewed and verified the accuracy of this note documented by Bambi Tejeda, medical scribe.      Pedro Ryan MD   [facility] EMERGENCY DEPARTMENT  4/23/2024    This note was created at least in part by the use of dragon voice dictation system. Inadvertent typographical errors may still exist.  Pedro Ryan MD.  Patient evaluated in the emergency department during the COVID-19 pandemic period. Careful attention to  patients safety was addressed throughout the evaluation. Evaluation and treatment management was initiated with disposition made efficiently and appropriate as possible to minimize any risk of potential exposure to patient during this evaluation.          Pedro Ryan MD  04/24/24 [id number]

## 2024-04-24 LAB
ANION GAP SERPL CALCULATED.3IONS-SCNC: 8 MMOL/L (ref 7–15)
BASOPHILS # BLD AUTO: 0.2 10E3/UL (ref 0–0.2)
BASOPHILS NFR BLD AUTO: 2 %
BUN SERPL-MCNC: 5.2 MG/DL (ref 6–20)
CALCIUM SERPL-MCNC: 8 MG/DL (ref 8.6–10)
CHLORIDE SERPL-SCNC: 106 MMOL/L (ref 98–107)
CREAT SERPL-MCNC: 0.51 MG/DL (ref 0.51–0.95)
DEPRECATED HCO3 PLAS-SCNC: 25 MMOL/L (ref 22–29)
EGFRCR SERPLBLD CKD-EPI 2021: >90 ML/MIN/1.73M2
EOSINOPHIL # BLD AUTO: 0.7 10E3/UL (ref 0–0.7)
EOSINOPHIL NFR BLD AUTO: 9 %
ERYTHROCYTE [DISTWIDTH] IN BLOOD BY AUTOMATED COUNT: 21.2 % (ref 10–15)
GLUCOSE SERPL-MCNC: 81 MG/DL (ref 70–99)
HCT VFR BLD AUTO: 21.5 % (ref 35–47)
HGB BLD-MCNC: 7.7 G/DL (ref 11.7–15.7)
IMM GRANULOCYTES # BLD: 0 10E3/UL
IMM GRANULOCYTES NFR BLD: 0 %
LYMPHOCYTES # BLD AUTO: 2.6 10E3/UL (ref 0.8–5.3)
LYMPHOCYTES NFR BLD AUTO: 31 %
MAGNESIUM SERPL-MCNC: 1.9 MG/DL (ref 1.7–2.3)
MCH RBC QN AUTO: 30.7 PG (ref 26.5–33)
MCHC RBC AUTO-ENTMCNC: 35.8 G/DL (ref 31.5–36.5)
MCV RBC AUTO: 86 FL (ref 78–100)
MONOCYTES # BLD AUTO: 0.8 10E3/UL (ref 0–1.3)
MONOCYTES NFR BLD AUTO: 10 %
NEUTROPHILS # BLD AUTO: 4.1 10E3/UL (ref 1.6–8.3)
NEUTROPHILS NFR BLD AUTO: 48 %
NRBC # BLD AUTO: 0.2 10E3/UL
NRBC BLD AUTO-RTO: 2 /100
PLATELET # BLD AUTO: 379 10E3/UL (ref 150–450)
POTASSIUM SERPL-SCNC: 3.7 MMOL/L (ref 3.4–5.3)
RBC # BLD AUTO: 2.51 10E6/UL (ref 3.8–5.2)
SODIUM SERPL-SCNC: 139 MMOL/L (ref 135–145)
WBC # BLD AUTO: 8.4 10E3/UL (ref 4–11)

## 2024-04-24 PROCEDURE — 250N000011 HC RX IP 250 OP 636: Performed by: INTERNAL MEDICINE

## 2024-04-24 PROCEDURE — 99221 1ST HOSP IP/OBS SF/LOW 40: CPT | Performed by: STUDENT IN AN ORGANIZED HEALTH CARE EDUCATION/TRAINING PROGRAM

## 2024-04-24 PROCEDURE — 85025 COMPLETE CBC W/AUTO DIFF WBC: CPT | Performed by: INTERNAL MEDICINE

## 2024-04-24 PROCEDURE — 250N000013 HC RX MED GY IP 250 OP 250 PS 637: Performed by: INTERNAL MEDICINE

## 2024-04-24 PROCEDURE — 82374 ASSAY BLOOD CARBON DIOXIDE: CPT | Performed by: INTERNAL MEDICINE

## 2024-04-24 PROCEDURE — 99233 SBSQ HOSP IP/OBS HIGH 50: CPT | Performed by: INTERNAL MEDICINE

## 2024-04-24 PROCEDURE — 83735 ASSAY OF MAGNESIUM: CPT | Performed by: INTERNAL MEDICINE

## 2024-04-24 PROCEDURE — 120N000002 HC R&B MED SURG/OB UMMC

## 2024-04-24 PROCEDURE — 36592 COLLECT BLOOD FROM PICC: CPT | Performed by: INTERNAL MEDICINE

## 2024-04-24 RX ADMIN — ONDANSETRON 4 MG: 2 INJECTION INTRAMUSCULAR; INTRAVENOUS at 18:54

## 2024-04-24 RX ADMIN — HYDROMORPHONE HYDROCHLORIDE 1 MG: 1 INJECTION, SOLUTION INTRAMUSCULAR; INTRAVENOUS; SUBCUTANEOUS at 01:44

## 2024-04-24 RX ADMIN — HYDROMORPHONE HYDROCHLORIDE 1 MG: 1 INJECTION, SOLUTION INTRAMUSCULAR; INTRAVENOUS; SUBCUTANEOUS at 04:46

## 2024-04-24 RX ADMIN — HYDROMORPHONE HYDROCHLORIDE 1 MG: 1 INJECTION, SOLUTION INTRAMUSCULAR; INTRAVENOUS; SUBCUTANEOUS at 08:10

## 2024-04-24 RX ADMIN — ACETAMINOPHEN 975 MG: 325 TABLET, FILM COATED ORAL at 22:29

## 2024-04-24 RX ADMIN — HYDROMORPHONE HYDROCHLORIDE 1 MG: 1 INJECTION, SOLUTION INTRAMUSCULAR; INTRAVENOUS; SUBCUTANEOUS at 11:43

## 2024-04-24 RX ADMIN — HYDROMORPHONE HYDROCHLORIDE 1 MG: 1 INJECTION, SOLUTION INTRAMUSCULAR; INTRAVENOUS; SUBCUTANEOUS at 16:38

## 2024-04-24 RX ADMIN — HYDROMORPHONE HYDROCHLORIDE 1 MG: 1 INJECTION, SOLUTION INTRAMUSCULAR; INTRAVENOUS; SUBCUTANEOUS at 22:29

## 2024-04-24 RX ADMIN — HYDROMORPHONE HYDROCHLORIDE 1 MG: 1 INJECTION, SOLUTION INTRAMUSCULAR; INTRAVENOUS; SUBCUTANEOUS at 19:44

## 2024-04-24 ASSESSMENT — ACTIVITIES OF DAILY LIVING (ADL)
ADLS_ACUITY_SCORE: 38
ADLS_ACUITY_SCORE: 37
ADLS_ACUITY_SCORE: 38
DEPENDENT_IADLS:: TRANSPORTATION;INDEPENDENT
ADLS_ACUITY_SCORE: 38
ADLS_ACUITY_SCORE: 38
ADLS_ACUITY_SCORE: 40
ADLS_ACUITY_SCORE: 40
ADLS_ACUITY_SCORE: 38
ADLS_ACUITY_SCORE: 37
ADLS_ACUITY_SCORE: 38

## 2024-04-24 NOTE — CONSULTS
Care Management Initial Consult    General Information  Assessment completed with: Patient,    Type of CM/SW Visit: Initial Assessment    Primary Care Provider verified and updated as needed: Yes   Readmission within the last 30 days: current reason for admission unrelated to previous admission         Advance Care Planning: Advance Care Planning Reviewed: no concerns identified        Communication Assessment  Patient's communication style: spoken language (English or Bilingual)        Cognitive  Cognitive/Neuro/Behavioral: WDL                      Living Environment:   People in home: parent(s) (mother)  Rohini  Current living Arrangements: house      Able to return to prior arrangements: yes     Family/Social Support:  Care provided by: parent(s)  Provides care for: no one, unable/limited ability to care for self  Marital Status: Single  Parent(s)          Description of Support System: Supportive, Involved    Support Assessment: Adequate family and caregiver support    Current Resources:   Patient receiving home care services: No     Community Resources: PCA  Equipment currently used at home:    Supplies currently used at home: None    Employment/Financial:  Employment Status: unemployed        Financial Concerns: none   Referral to Financial Worker: No     Does the patient's insurance plan have a 3 day qualifying hospital stay waiver?  No    Lifestyle & Psychosocial Needs:  Social Determinants of Health     Food Insecurity: Not on file   Depression: Not at risk (3/6/2023)    PHQ-2     PHQ-2 Score: 2   Housing Stability: Not on file   Tobacco Use: Low Risk  (4/22/2024)    Patient History     Smoking Tobacco Use: Never     Smokeless Tobacco Use: Never     Passive Exposure: Never   Financial Resource Strain: Not on file   Alcohol Use: Not on file   Transportation Needs: Not on file   Physical Activity: Not on file   Interpersonal Safety: Not At Risk (3/10/2023)    Humiliation, Afraid, Rape, and Kick questionnaire      Fear of Current or Ex-Partner: No     Emotionally Abused: No     Physically Abused: No     Sexually Abused: No   Stress: Not on file   Social Connections: Not on file   Health Literacy: Not on file     Functional Status:  Prior to admission patient needed assistance:   Dependent ADLs:: Independent  Dependent IADLs:: Transportation, Independent     Mental Health Status:  Mental Health Status: No Current Concerns       Chemical Dependency Status:  Chemical Dependency Status: No Current Concerns           Values/Beliefs:  Spiritual, Cultural Beliefs, Catholic Practices, Values that affect care: no             Additional Information:     Met with patient at bedside for initial consult due to high risk score. Introduced self and the role of the care tea,. Patient was open to answer the assessment questions. Patient confirmed living at the house address on file with mother who helps with most of her care. Patient states she walks independently but does not drive so will take a cab ride at discharge. Patient confirmed her current insurance and PCP on file.     CC will continue to follow up.   Hilda Morales RN, PHN, BSN   Float Nurse Care Coordinator  Covering for Unit 5[facility]  Phone [phone]

## 2024-04-24 NOTE — CONSULTS
Hematology Consult Note   Date of Service: 04/24/2024    Patient: Jennifer Cervantes  MRN: [id number]  Admission Date: 4/23/2024  Today's Date: 4/24/2024  Hospital Day # Hospital Day: 2    Reason for Consult: sickle cell anemia, hypoxemia    Assessment & Plan:   Jennifer Cervantes is a 24 year old female with HbSS sickle cell anemia, pediatric hx of CVA, and asthma admitted on 4/23/2024 for evaluation of persistent shortness of breath and hypoxia. During treatment for sickle cell pain at an infusion center on 4/22, she was found to by hypoxic to the 80s and anemic to Hb 6.1, was transfused, went home, but returned to the hospital feeling SOB with vomiting on 4/23, now admitted.    Persistent hypoxia is likely due to asthma exacerbation with allergen exposure at home, need to rule out pulmonary hypertension. No signs of infection on CXR and labs, and no signs of PE on V/Q scan, does not seem to be fluid-overloaded, not concerning for acute chest syndrome.    Recommendations:   - Continue q3h IV Dilaudid, pain is controlled with this  - OK to give steroids during this hospitalization for acute hypoxic respiratory failure and for asthma symptoms in addition to DuoNeb and Symbicort, encourage incentive spirometry  - consider echocardiogram to assess for R heart function, avoid fluid overload  - recommended to be treated to standard of care for asthma by PCP or pulmonology after discharge ie, daily Symbicort    Patient was seen and plan of care was discussed with attending physician Dr. Cogan.    We will continue to follow this patient. Please don't hesitate to contact the Fellow On-Call with questions.    I spent 45 minutes face-to-face or coordinating care of Jennifer Cervantes.  Over 50% of our time on the unit was spent counseling the patient and/or coordinating care regarding     Srinath Pisano, MS4  [employer]      Physician Attestation   I saw this patient with the medical/ANDREAS student who  participated in the service and in the documentation of the note. I have verified the history and personally performed the physical exam and medical decision making. I agree with the findings, assessment, and plan of care as documented in the note, with the following additions:    Key findings: 24-year-old woman with sickle cell anemia (Hb SS), prior CVA with residual hemiparesis and cognitive delay, asthma, recurrent VTE's, admitted with hypoxemia.  She is requiring 3 to 4 L of nasal cannula oxygen at present.  VQ scan did not show evidence of PE.  On exam she has tightness and wheezing on the right side of her chest.  Suggest management for asthma exacerbation and infection rule out.  If not improving, may need reassessment for pulmonary hypertension.    45 minutes spent by me on the date of the encounter doing chart review, history and exam, documentation and further activities per the note    Jacob Cogan, MD  Hematology        History of Present Illness:    Jennifer Cervantes is a 24 year old femaleHbSS sickle cell anemia and asthma admitted on 4/23/2024 for evaluation of persistent shortness of breath and hypoxia. During treatment for sickle cell pain at an infusion center on 4/22, she was found to by hypoxic to the 80s and anemic to Hb 6.1, was admitted and transfused. On 4/22 she went home, but returned to the hospital feeling SOB with vomiting on the morning of 4/23, now admitted.    Her current sickle pain is well-controlled with medication as given (IV dilaudid q3 h) but remains with SOB and wheezing. Duoneb has provided relief in the hospital. No vomiting today.    She describes feeling generally ill with sickle cell pain and SOB beginning around 4/20 and seeking treatment at an IV infusion center on 4/22. Taking albuterol and Symbicort PRN for asthma symptoms without full relief. After a brief hospitalization on 4/22, she went home to sleep. In the morning of 4/23, she reports waking up with a headache, and  being in a room where her family member was sorting through many boxes of clothing that may have had dust or mold. She describes coughing and vomiting several times. She denies recent viral illness and UTI.     She recalls a similar admission for hypoxia in May 2023 which was attributed to her asthma. At that hospitalization she was given neb treatments and a low dose of 0.5 mg/kg IV Solumedrol. Sent home with a Prednisone 40 mg for 5 days for her asthma symptoms.    Her last echocardiogram was on 9/9/2022 with showed normal EF, normal pulmonary artery systolic pressure, and no R heart abnormalities or other defects. In Feb 2022 she had an echo bubble study demonstrating PFO.    Hematologic History:  HbSS sickle cell anemia  History of VTE February 2021     Review of Systems: Pertinent positive and negative systems described in HPI; the remainder of the 14 systems are negative    Past Medical History:  Past Medical History:   Diagnosis Date    Anxiety     Bleeding disorder (H24)     Blood clotting disorder (H24)     Cerebral infarction (H) 2015    Cognitive developmental delay     low IQ. 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 side (H)     right hand contractures    Iron overload due to repeated red blood cell transfusions     Migraines     Multiple subsegmental pulmonary emboli without acute cor pulmonale (H) 02/01/2021    Oppositional defiant behavior     Presence of intrauterine contraceptive device 2/18/2020    Superficial venous thrombosis of arm, right 03/25/2021    Uncomplicated asthma        Past Surgical History:  Past Surgical History:   Procedure Laterality Date    AS INSERT TUNNELED CV 2 CATH W/O PORT/PUMP      CHOLECYSTECTOMY      CV RIGHT HEART CATH MEASUREMENTS RECORDED N/A 11/18/2021    Procedure: Right Heart Cath;  Surgeon: Jackson Stauffer MD;  Location[facility]  "CARDIAC CATH LAB    INSERT PORT VASCULAR ACCESS Left 4/21/2021    Procedure: INSERTION, VASCULAR ACCESS PORT (NOT SURE ON SIDE UNTIL REMOVAL);  Surgeon: Rajan More MD;  Location: [facility] OR    IR CHEST PORT PLACEMENT > 5 YRS OF AGE  4/21/2021    IR CVC NON TUNNEL LINE REMOVAL  5/6/2021    IR CVC NON TUNNEL PLACEMENT > 5 YRS  04/07/2020    IR CVC NON TUNNEL PLACEMENT > 5 YRS  4/30/2021    IR CVC NON TUNNEL PLACEMENT > 5 YRS  9/7/2022    IR PORT REMOVAL LEFT  4/21/2021    REMOVE PORT VASCULAR ACCESS Left 4/21/2021    Procedure: REMOVAL, VASCULAR ACCESS PORT LEFT;  Surgeon: Rajan More MD;  Location: [facility] OR    REPAIR TENDON ELBOW Right 10/02/2019    Procedure: Right Elbow Flexor Lengthening, Flexor Pronator Slide Of Wrist and Finger, Thumb Adductor Lengthening;  Surgeon: Anai Franco MD;  Location: [facility] OR    TONSILLECTOMY Bilateral 10/02/2019    Procedure: Bilateral Tonsillectomy;  Surgeon: Farhana Guy MD;  Location: [facility] OR    ZZC BREAST REDUCTION (INCLUDES LIPO) TIER 3 Bilateral 04/23/2019       Social History:  Social History     Socioeconomic History    Marital status: Single   Tobacco Use    Smoking status: Never     Passive exposure: Never    Smokeless tobacco: Never   Substance and Sexual Activity    Alcohol use: Not Currently     Alcohol/week: 0.0 standard drinks of alcohol    Drug use: Never    Sexual activity: Not Currently     Partners: Male     Birth control/protection: Other   Social History Narrative    Lives with mom and extended family (mom is her PCA and ILS worker) but \"toxic environment\" per her report. As of 9/28/2022 she is planning to move out at some point soon. She has minimal support at home despite her significant SCD comorbidities and cognitive delay from stroke.     Social Determinants of Health     Interpersonal Safety: Not At Risk (3/10/2023)    Humiliation, Afraid, Rape, and Kick questionnaire     Fear of Current or Ex-Partner: No     Emotionally Abused: No     " Physically Abused: No     Sexually Abused: No        Family History  Family History   Problem Relation Age of Onset    Sickle Cell Trait Mother     Hypertension Mother     Asthma Mother     Sickle Cell Trait Father     Glaucoma No family hx of     Macular Degeneration No family hx of     Diabetes No family hx of     Gout No family hx of        Outpatient Medications:  Current Facility-Administered Medications   Medication Dose Route Frequency Provider Last Rate Last Admin    acetaminophen (TYLENOL) tablet 975 mg  975 mg Oral Q8H Gino Wong MD   975 mg at 04/23/24 2305    calcium carbonate (TUMS) chewable tablet 1,000 mg  1,000 mg Oral 4x Daily PRN Gino Wong MD        HYDROmorphone (DILAUDID) injection 1 mg  1 mg Intravenous Q3H PRN Gino Wong MD   1 mg at 04/24/24 0810    ipratropium - albuterol 0.5 mg/2.5 mg/3 mL (DUONEB) neb solution 3 mL  3 mL Nebulization Q4H PRN Gino Wong MD        lidocaine (LMX4) cream   Topical Q1H PRN Pedro Ryan MD        lidocaine (LMX4) cream   Topical Q1H PRN Gino Wong MD        lidocaine 1 % 0.1-1 mL  0.1-1 mL Other Q1H PRN Pedro Ryan MD        lidocaine 1 % 0.1-1 mL  0.1-1 mL Other Q1H PRN Gino Wong MD        ondansetron (ZOFRAN ODT) ODT tab 4 mg  4 mg Oral Q6H PRN Gino Wong MD        Or    ondansetron (ZOFRAN) injection 4 mg  4 mg Intravenous Q6H PRN Gino Wong MD        senna-docusate (SENOKOT-S/PERICOLACE) 8.6-50 MG per tablet 1 tablet  1 tablet Oral BID PRN Gino Wong MD        Or    senna-docusate (SENOKOT-S/PERICOLACE) 8.6-50 MG per tablet 2 tablet  2 tablet Oral BID PRN Gino Wong MD        sodium chloride (PF) 0.9% PF flush 3 mL  3 mL Intracatheter Q8H Pedro Ryan MD   3 mL at 04/23/24 2002    sodium chloride (PF) 0.9% PF flush 3 mL  3 mL Intracatheter q1 min prn Pedro Ryan MD        sodium chloride (PF) 0.9%  "PF flush 3 mL  3 mL Intracatheter Q8H Gino Wong MD   3 mL at 04/23/24 2306    sodium chloride (PF) 0.9% PF flush 3 mL  3 mL Intracatheter q1 min prn Gino Wong MD   20 mL at 04/24/24 0613    sodium chloride 0.9 % infusion   Intravenous Continuous Gino Wong  mL/hr at 04/24/24 0144 Rate Verify at 04/24/24 0144        Physical Exam:    BP 93/47 (BP Location: Left arm)   Pulse 92   Temp 97.9  F (36.6  C) (Oral)   Resp 15   LMP  (LMP Unknown)   SpO2 97%  on 2 lpm; sats down to 89% on RA  Gen: Tired-appearing, in moderate distress to discomfort, laying in bed, not coughing on exam  HEENT: EOMI, oxygen mask in place  CV: Normal rate, regular rhythm. No m/r/g  Pulm: Restricted breath sounds L>R, left lower lobes with course breath sounds, transmitted upper airway sounds, and wheezing audible with deep inspiration  Abd: Soft, mildly obese  Ext: No lower extremity edema  Skin: No remarkable rash, cyanosis or petechial lesion  Neuro: Alert and answering questions appropriately. RUE some restricted movement, otherwise moving all extremities without issue or focal neurologic deficits.    Labs & Studies: I personally reviewed the following studies:    Imaging -- CT chest w/o contrast on 4/23 showing \"mild dependent atelectasis bilaterally\" without signs of pneumonia or pleural effusion    ROUTINE LABS (Last four results):  CMP  Recent Labs   Lab 04/24/24  0614 04/23/24  1754 04/22/24  1058 04/19/24  1101    136 139 140   POTASSIUM 3.7 3.8 3.8 4.0   CHLORIDE 106 102 107 107   CO2 25 25 22 23   ANIONGAP 8 9 10 10   GLC 81 77 88 97   BUN 5.2* 4.7* 7.3 5.1*   CR 0.51 0.53  0.53 0.51 0.57   GFRESTIMATED >90 >90  >90 >90 >90   MICAH 8.0* 8.6 8.3* 8.5*   MAG 1.9  --   --   --    PROTTOTAL  --  6.8 6.7  --    ALBUMIN  --  4.2 4.1  --    BILITOTAL  --  3.6* 3.6*  --    ALKPHOS  --  60 53  --    AST  --  55* 48*  --    ALT  --  21 14  --      CBC  Recent Labs   Lab 04/24/24  0656 " 04/23/24  1754 04/22/24  1058 04/19/24  1101   WBC 8.4 10.9 12.9* 8.1   RBC 2.51* 2.63* 1.97* 2.33*   HGB 7.7* 8.1* 6.1* 7.4*   HCT 21.5* 22.2* 17.3* 20.1*   MCV 86 84 88 86   MCH 30.7 30.8 31.0 31.8   MCHC 35.8 36.5 35.3 36.8*   RDW 21.2* 21.3* 23.5* 23.7*    369 342 427     INR  Recent Labs   Lab 04/23/24  1754   INR 1.25*

## 2024-04-24 NOTE — PROGRESS NOTES
[facility]    Medicine Progress Note - Hospitalist Service, GOLD TEAM 20    Date of Admission:  4/23/2024    Assessment & Plan      Jennifer Cervantes is a 24 year old female admitted on 4/23/2024 for evaluation of persistent shortness of breath and hypoxia.  Patient has a past medical history significant for sickle cell disease and spastic hemiplegia.   Patient is stated not feeling well approximately 2 days ago, she was seen at the infusion center yesterday and received IV pain medication due to concern for possible sickle cell disease flare, she also was noticed with persistent hypoxia with O2 sats in the mid 80s.  Patient also was found with a hemoglobin of 6.1.  Patient was evaluated at the emergency department and she had a VQ scan that was negative for acute PE (CT chest with contrast was not done because is allergic to contrast).  Patient was discharged home but she presented again to the infusion center today due to persistent shortness of breath, malaise, nausea and vomiting.  Patient was found with persistent hypoxia today and was placed on O2 nasal cannula.  Patient also received 1 unit of red blood cells and hemoglobin went up to 8.1.  Patient was referred again to the ER for evaluation of persistent hypoxia.  Patient denies any fever, productive cough, upper respiratory symptoms, chills, diaphoresis, dysuria, abdominal pain or diarrhea.  Patient also denies any sick contacts.  At the emergency department chest x-ray showed no acute infiltrate.  Respiratory viral panel was negative for COVID or influenza.  White blood cell count was normal.  Patient was admitted to the hospital for workup of acute hypoxic respiratory failure.    # Acute hypoxic respiratory failure  - No clear etiology for persistent hypoxia at this time.  CT chest was done at the emergency department and waiting for official report from radiology.  Follow-up results.  - Continue on O2 nasal  cannula for now.  - Check procalcitonin.  Hold antibiotics for now patient is afebrile and normal white blood cell count at this moment.  - DuoNebs as needed.  - Continue on PTA Symbicort.    # Sickle cell disease  - Hematology consult in the morning.  - Continue on IV Dilaudid for acute pain, patient endorsed good response from previous doses of IV Dilaudid.  - Continue monitoring hemoglobin, no further transfusions for now.  - Continue on gentle hydration for now.           Diet: Combination Diet Regular Diet Adult    DVT Prophylaxis: Pneumatic Compression Devices  Lopez Catheter: Not present  Lines: PRESENT             Cardiac Monitoring: None  Code Status: Full Code      Clinically Significant Risk Factors Present on Admission          # Hypocalcemia: Lowest Ca = 8 mg/dL in last 2 days, will monitor and replace as appropriate      # Coagulation Defect: INR = 1.25 (Ref range: 0.85 - 1.15) and/or PTT = 26 Seconds (Ref range: 22 - 38 Seconds), will monitor for bleeding  # Drug Induced Platelet Defect: home medication list includes an antiplatelet medication     # Acute Respiratory Failure: Documented O2 saturation < 91%.  Continue supplemental oxygen as needed         # Asthma: noted on problem list        Disposition Plan     Medically Ready for Discharge: Anticipated in 2-4 Days             Jace Mendoza DO, VERNON  Hospitalist Service, GOLD TEAM 20  [facility]  Securely message with [employer] (more info)  Text page via [employer] Paging/Directory   See signed in provider for up to date coverage information  ______________________________________________________________________    Interval History   Patient reports feeling OK today.  Per nursing staff, patient doesn't want Dilaudid PCA.  Patient's pain is controlled on IV push.  Work of breathing is marginally improved.    Physical Exam   Vital Signs: Temp: 97.9  F (36.6  C) Temp src: Oral BP: 101/63 Pulse: 59   Resp: 16 SpO2: 97  % O2 Device: Oxymask Oxygen Delivery: 3 LPM  Weight: 0 lbs 0 oz    GENERAL: Alert and oriented x 3; no acute distress; well-nourished.  HEENT: Normocephalic; atraumatic; PERRLA; MMM.  CV: RRR; normal S1, S2; no rubs, murmurs, or gallops.  RESP: Lung fields clear to aucultation B/L; no wheezing or crepitations.  GI: Abdomen is soft, nontender, nondistended; no organomegaly; normal bowel sounds.  : Deferred genital examination.   MSK: No clubbing, cyanosis, or edema.  DERM: Skin is intact; no rash, lesions, or skin breakdown.  NEURO: Right hemiplegia.  PSYCH: No active hallucinations; affect, insight appear within normal limits.    Medical Decision Making       55 MINUTES SPENT BY ME on the date of service doing chart review, history, exam, documentation & further activities per the note.      Data     I have personally reviewed the following data over the past 24 hrs:    8.4  \   7.7 (L)   / 379     139 106 5.2 (L) /  81   3.7 25 0.51 \     ALT: 21 AST: 55 (H) AP: 60 TBILI: 3.6 (H)   ALB: 4.2 TOT PROTEIN: 6.8 LIPASE: 14     Trop: <6 BNP: 161     TSH: 0.60 T4: N/A A1C: N/A     Procal: N/A CRP: <3.00 Lactic Acid: 0.5 (L)       INR:  1.25 (H) PTT:  26   D-dimer:  N/A Fibrinogen:  N/A       Imaging results reviewed over the past 24 hrs:   Recent Results (from the past 24 hour(s))   XR Chest 2 Views    Narrative    EXAM: XR CHEST 2 VIEWS  LOCATION: [facility]  DATE: 4/23/2024    INDICATION: hypoxia    COMPARISON: 04/22/2024      Impression    IMPRESSION: Lungs are clear. No pleural effusion or pneumothorax. Normal heart size. Stable left-sided port.     POC US ECHO LIMITED    Impression    Limited Bedside Cardiac Ultrasound, performed and interpreted by me.   Indication: Shortness of Breath.  Parasternal long axis and parasternal short axis views were acquired.   Image quality was satisfactory.    Findings:    Global left ventricular function appears intact.  Chambers do not  appear dilated.  There is no evidence of free fluid within the pericardium.    IMPRESSION: Grossly normal left ventricular function and chamber size.  No pericardial effusion..       CT Chest w/o Contrast    Narrative    EXAM: CT CHEST W/O CONTRAST  LOCATION: [facility]  DATE: 4/23/2024    INDICATION: hypoxia sickle cell allergic to contrast!  COMPARISON: None.  TECHNIQUE: CT chest without IV contrast. Multiplanar reformats were obtained. Dose reduction techniques were used.  CONTRAST: None.    FINDINGS:   LUNGS AND PLEURA: There is mild dependent atelectasis bilaterally. The lungs are otherwise clear. No pneumothorax or pleural effusion.    MEDIASTINUM/AXILLAE: Left chest wall port. No lymph node enlargement. The heart size is normal.    CORONARY ARTERY CALCIFICATION: None.    UPPER ABDOMEN: Small calcified spleen consistent with history of sickle cell disease.    MUSCULOSKELETAL: Unremarkable.      Impression    IMPRESSION:   1.  Mild dependent atelectasis bilaterally. No other acute abnormality.

## 2024-04-24 NOTE — PLAN OF CARE
Goal Outcome Evaluation:      Plan of Care Reviewed With: patient    Overall Patient Progress: improvingOverall Patient Progress: improving         Hilda Morales RN, PHN, BSN   Float Nurse Care Coordinator  Covering for Unit 5MS  Phone [phone]

## 2024-04-24 NOTE — ED NOTES
Patient continues to be A/O x 4 very pleasant and is soft spoken.  Bhargavi cath - SL.  Patient's O2 sat dropped to 80% RA. Patient placed back on O2 3LPM OxyMask. Prefers mask instead of the nasal cannula. Pt denied feeling SOB when O2 dropped to 80%. Patient was not doing anything was just resting on bed but O2 sat dropped. MD updated.  Pt is continent with B & B, commode at .

## 2024-04-24 NOTE — PLAN OF CARE
/74 (BP Location: Right arm, Patient Position: Semi-Short's, Cuff Size: Adult Regular)   Pulse 90   Temp 97.9  F (36.6  C) (Oral)   Resp 14   LMP  (LMP Unknown)   SpO2 92%    No acute events overnight  Admitted from ED to unit 0200 for hypoxia.   On 3L oxy mask. VSS. A.O x 4   Pain managed with q3hr Dilaudid . Refuses tylenol .   SBA to BSC-Cont BB. C/o of  generalized weakness  Port a cath infusing cont NS at 100ml/hr  Reg diet.

## 2024-04-24 NOTE — H&P
[facility]    History and Physical - Hospitalist Service, GOLD TEAM        Date of Admission:  4/23/2024    Assessment & Plan      Jennifer Cervantes is a 24 year old female admitted on 4/23/2024 for evaluation of persistent shortness of breath and hypoxia.  Patient has a past medical history significant for sickle cell disease and spastic hemiplegia.   Patient is stated not feeling well approximately 2 days ago, she was seen at the infusion center yesterday and received IV pain medication due to concern for possible sickle cell disease flare, she also was noticed with persistent hypoxia with O2 sats in the mid 80s.  Patient also was found with a hemoglobin of 6.1.  Patient was evaluated at the emergency department and she had a VQ scan that was negative for acute PE (CT chest with contrast was not done because is allergic to contrast).  Patient was discharged home but she presented again to the infusion center today due to persistent shortness of breath, malaise, nausea and vomiting.  Patient was found with persistent hypoxia today and was placed on O2 nasal cannula.  Patient also received 1 unit of red blood cells and hemoglobin went up to 8.1.  Patient was referred again to the ER for evaluation of persistent hypoxia.  Patient denies any fever, productive cough, upper respiratory symptoms, chills, diaphoresis, dysuria, abdominal pain or diarrhea.  Patient also denies any sick contacts.  At the emergency department chest x-ray showed no acute infiltrate.  Respiratory viral panel was negative for COVID or influenza.  White blood cell count was normal.  Patient was admitted to the hospital for workup of acute hypoxic respiratory failure.    Acute hypoxic respiratory failure  -No clear etiology for persistent hypoxia at this time.  CT chest was done at the emergency department and waiting for official report from radiology.  Follow-up results.  -Continue on O2 nasal cannula  for now.  -Check procalcitonin.  Hold antibiotics for now patient is afebrile and normal white blood cell count at this moment.  -DuoNebs as needed.  -Continue on PTA Symbicort.    Sickle cell disease  -Hematology consult in the morning.  -Continue on IV Dilaudid for acute pain, patient endorsed good response from previous doses of IV Dilaudid.  -Continue monitoring hemoglobin, no further transfusions for now.  -Continue on gentle hydration for now.           Diet: Combination Diet Regular Diet Adult    DVT Prophylaxis: Pneumatic Compression Devices and Anti-embolisim stockings (TEDs)  Lopez Catheter: Not present  Lines: PRESENT             Cardiac Monitoring: None  Code Status: Full Code      Clinically Significant Risk Factors Present on Admission          # Hypocalcemia: Lowest Ca = 8.3 mg/dL in last 2 days, will monitor and replace as appropriate      # Coagulation Defect: INR = 1.25 (Ref range: 0.85 - 1.15) and/or PTT = 26 Seconds (Ref range: 22 - 38 Seconds), will monitor for bleeding  # Drug Induced Platelet Defect: home medication list includes an antiplatelet medication     # Acute Respiratory Failure: Documented O2 saturation < 91%.  Continue supplemental oxygen as needed         # Asthma: noted on problem list        Disposition Plan     Medically Ready for Discharge: Anticipated in 2-4 Days           Gino Wong MD  Hospitalist Service, [facility]  Securely message with [employer] (more info)  Text page via [facility] Paging/Directory   See signed in provider for up to date coverage information    ______________________________________________________________________    Chief Complaint     Shortness of breath    History is obtained from the patient    History of Present Illness   Jennifer Cervantes is a 24 year old female admitted on 4/23/2024 for evaluation of persistent shortness of breath and hypoxia.  Patient is stated not feeling well  approximately 2 days ago, she was seen at the infusion center yesterday and received IV pain medication due to concern for possible sickle cell disease flare, she also was noticed with persistent hypoxia with O2 sats in the mid 80s.  Patient also was found with a hemoglobin of 6.1.  Patient was evaluated at the emergency department and she had a VQ scan that was negative for acute PE (CT chest with contrast was not done because is allergic to contrast).  Patient was discharged home but she presented again to the infusion center today due to persistent shortness of breath, malaise, nausea and vomiting.  Patient was found with persistent hypoxia today and was placed on O2 nasal cannula.  Patient also received 1 unit of red blood cells and hemoglobin went up to 8.1.  Patient was referred again to the ER for evaluation of persistent hypoxia.  Patient denies any fever, productive cough, upper respiratory symptoms, chills, diaphoresis, dysuria, abdominal pain or diarrhea.    I saw the patient at the emergency department.  Patient was hemodynamically stable, afebrile.  Patient looked comfortable but she endorsed pain.  Patient was on O2 nasal cannula maintaining good oxygen saturations.  Patient agreed with admission to the hospital for workup of persistent hypoxia.      Past Medical History    Past Medical History:   Diagnosis Date    Anxiety     Bleeding disorder (H24)     Blood clotting disorder (H24)     Cerebral infarction (H) 2015    Cognitive developmental delay     low IQ. 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 side (H)     right hand contractures    Iron overload due to repeated red blood cell transfusions     Migraines     Multiple subsegmental pulmonary emboli without acute cor pulmonale (H) 02/01/2021    Oppositional defiant behavior     Presence of intrauterine contraceptive device  2/18/2020    Superficial venous thrombosis of arm, right 03/25/2021    Uncomplicated asthma        Past Surgical History   Past Surgical History:   Procedure Laterality Date    AS INSERT TUNNELED CV 2 CATH W/O PORT/PUMP      CHOLECYSTECTOMY      CV RIGHT HEART CATH MEASUREMENTS RECORDED N/A 11/18/2021    Procedure: Right Heart Cath;  Surgeon: Jackson Stauffer MD;  Location:  HEART CARDIAC CATH LAB    INSERT PORT VASCULAR ACCESS Left 4/21/2021    Procedure: INSERTION, VASCULAR ACCESS PORT (NOT SURE ON SIDE UNTIL REMOVAL);  Surgeon: Rajan More MD;  Location: [facility] OR    IR CHEST PORT PLACEMENT > 5 YRS OF AGE  4/21/2021    IR CVC NON TUNNEL LINE REMOVAL  5/6/2021    IR CVC NON TUNNEL PLACEMENT > 5 YRS  04/07/2020    IR CVC NON TUNNEL PLACEMENT > 5 YRS  4/30/2021    IR CVC NON TUNNEL PLACEMENT > 5 YRS  9/7/2022    IR PORT REMOVAL LEFT  4/21/2021    REMOVE PORT VASCULAR ACCESS Left 4/21/2021    Procedure: REMOVAL, VASCULAR ACCESS PORT LEFT;  Surgeon: Rajan More MD;  Location: [facility] OR    REPAIR TENDON ELBOW Right 10/02/2019    Procedure: Right Elbow Flexor Lengthening, Flexor Pronator Slide Of Wrist and Finger, Thumb Adductor Lengthening;  Surgeon: Anai Franco MD;  Location: [facility] OR    TONSILLECTOMY Bilateral 10/02/2019    Procedure: Bilateral Tonsillectomy;  Surgeon: Farhana Guy MD;  Location: [facility] OR    ZZC BREAST REDUCTION (INCLUDES LIPO) TIER 3 Bilateral 04/23/2019       Prior to Admission Medications   Prior to Admission Medications   Prescriptions Last Dose Informant Patient Reported? Taking?   EPINEPHrine (ANY BX GENERIC EQUIV) 0.3 MG/0.3ML injection 2-pack  Self No No   Sig: Inject 0.3 mLs (0.3 mg) into the muscle as needed for anaphylaxis   Patient not taking: Reported on 3/18/2024   Hydroxyurea 1000 MG TABS   No No   Sig: Take 3,000 mg by mouth daily   acetaminophen (TYLENOL) 325 MG tablet  Self No No   Sig: Take 2 tablets (650 mg) by mouth every 6 hours as needed for mild pain    Patient not taking: Reported on 3/18/2024   albuterol (PROAIR HFA/PROVENTIL HFA/VENTOLIN HFA) 108 (90 Base) MCG/ACT inhaler   No No   Sig: Inhale 2 puffs into the lungs every 6 hours as needed for shortness of breath or wheezing   albuterol (PROVENTIL) (2.5 MG/3ML) 0.083% neb solution   No No   Sig: Take 2 vials (5 mg) by nebulization every 6 hours as needed for shortness of breath or wheezing   aspirin (ASA) 81 MG chewable tablet   No No   Sig: Take 1 tablet (81 mg) by mouth 2 times daily   budesonide-formoterol (SYMBICORT) 160-4.5 MCG/ACT Inhaler   No No   Sig: Inhale 2 puffs into the lungs 2 times daily   budesonide-formoterol (SYMBICORT) 160-4.5 MCG/ACT Inhaler   No No   Sig: Inhale 2 puffs twice daily plus 1-2 puffs as needed. May use up to 12 puffs per day.   cetirizine (ZYRTEC) 10 MG tablet  Self No No   Sig: Take 1 tablet (10 mg) by mouth daily   deferasirox (JADENU) 360 MG tablet   No No   Sig: Take 4 tablets (1,440 mg) by mouth every evening   diphenhydrAMINE (BENADRYL) 25 MG capsule  Self No No   Sig: Take 1 capsule (25 mg) by mouth every 6 hours as needed for itching or allergies   hydroxyurea (HYDREA) 500 MG capsule   Yes No   ibuprofen (ADVIL/MOTRIN) 600 MG tablet   Yes No   Sig: Take 600 mg by mouth every 6 hours as needed for moderate pain Using rarely, 2x/week at most   melatonin 5 MG tablet   No No   Sig: Take 1 tablet (5 mg) by mouth nightly as needed for sleep   methocarbamol (ROBAXIN) 750 MG tablet   No No   Sig: Take 1 tablet (750 mg) by mouth 4 times daily as needed for muscle spasms (during sickle pain crises. Okay to take scheduled while in pain)   naloxone (NARCAN) 4 MG/0.1ML nasal spray  Self Yes No   Sig: Spray 4 mg into one nostril alternating nostrils as needed for opioid reversal every 2-3 minutes until assistance arrives   Patient not taking: Reported on 2/5/2024   naloxone (NARCAN) 4 MG/0.1ML nasal spray   Yes No   Sig: Spray 1 spray (4 mg) into one nostril alternating nostrils as  needed for opioid reversal every 2-3 minutes until assistance arrives   Patient not taking: Reported on 3/18/2024   omeprazole (PRILOSEC) 20 MG DR capsule   No No   Sig: Take 1 capsule (20 mg) by mouth daily   ondansetron (ZOFRAN) 8 MG tablet   No No   Sig: Take 1 tablet (8 mg) by mouth every 8 hours as needed for nausea   oxyCODONE IR (ROXICODONE) 10 MG tablet   No No   Sig: Take 1 tablet (10 mg) by mouth every 4 hours as needed for severe pain or breakthrough pain Goal 4 per day. Max 6 per day.      Facility-Administered Medications: None        Review of Systems    The 10 point Review of Systems is negative other than noted in the HPI or here.      Physical Exam   Vital Signs: Temp: 98  F (36.7  C) Temp src: Oral BP: 112/64 Pulse: 95   Resp: 18 SpO2: 100 % O2 Device: Oxymask Oxygen Delivery: 3 LPM  Weight: 0 lbs 0 oz    General Appearance: Alert, on O2 nasal cannula, no acute distress.  Respiratory: Normal respiratory effort, good aeration, no crackles or wheezing.  Cardiovascular: Regular rate and rhythm, normal heart sounds, no murmur.  GI: Abdomen was soft, positive bowel sounds, no tenderness to palpation.  Skin: No rash.  Other: Alert, oriented x 3.  Pleasant.  Right arm with spasticity.  Moving all extremities.    Medical Decision Making       55 MINUTES SPENT BY ME on the date of service doing chart review, history, exam, documentation & further activities per the note.      Data     I have personally reviewed the following data over the past 24 hrs:    10.9  \   8.1 (L)   / 369     136 102 4.7 (L) /  77   3.8 25 0.53; 0.53 \     ALT: 21 AST: 55 (H) AP: 60 TBILI: 3.6 (H)   ALB: 4.2 TOT PROTEIN: 6.8 LIPASE: 14     Trop: <6 BNP: 161     TSH: 0.60 T4: N/A A1C: N/A     Procal: N/A CRP: <3.00 Lactic Acid: 0.5 (L)       INR:  1.25 (H) PTT:  26   D-dimer:  N/A Fibrinogen:  N/A       Imaging results reviewed over the past 24 hrs:   Recent Results (from the past 24 hour(s))   XR Chest 2 Views    Narrative    EXAM:  XR CHEST 2 VIEWS  LOCATION: [facility]  DATE: 4/23/2024    INDICATION: hypoxia    COMPARISON: 04/22/2024      Impression    IMPRESSION: Lungs are clear. No pleural effusion or pneumothorax. Normal heart size. Stable left-sided port.

## 2024-04-25 ENCOUNTER — APPOINTMENT (OUTPATIENT)
Dept: CARDIOLOGY | Facility: CLINIC | Age: 24
DRG: 189 | End: 2024-04-25
Attending: PHYSICIAN ASSISTANT
Payer: COMMERCIAL

## 2024-04-25 LAB
LVEF ECHO: NORMAL
MAGNESIUM SERPL-MCNC: 1.8 MG/DL (ref 1.7–2.3)
POTASSIUM SERPL-SCNC: 4 MMOL/L (ref 3.4–5.3)

## 2024-04-25 PROCEDURE — 99233 SBSQ HOSP IP/OBS HIGH 50: CPT | Performed by: INTERNAL MEDICINE

## 2024-04-25 PROCEDURE — 258N000003 HC RX IP 258 OP 636: Performed by: INTERNAL MEDICINE

## 2024-04-25 PROCEDURE — 250N000011 HC RX IP 250 OP 636: Performed by: INTERNAL MEDICINE

## 2024-04-25 PROCEDURE — 93306 TTE W/DOPPLER COMPLETE: CPT

## 2024-04-25 PROCEDURE — 250N000013 HC RX MED GY IP 250 OP 250 PS 637: Performed by: INTERNAL MEDICINE

## 2024-04-25 PROCEDURE — 84132 ASSAY OF SERUM POTASSIUM: CPT | Performed by: INTERNAL MEDICINE

## 2024-04-25 PROCEDURE — 120N000002 HC R&B MED SURG/OB UMMC

## 2024-04-25 PROCEDURE — 36415 COLL VENOUS BLD VENIPUNCTURE: CPT | Performed by: INTERNAL MEDICINE

## 2024-04-25 PROCEDURE — 83735 ASSAY OF MAGNESIUM: CPT | Performed by: INTERNAL MEDICINE

## 2024-04-25 PROCEDURE — 93306 TTE W/DOPPLER COMPLETE: CPT | Mod: 26 | Performed by: INTERNAL MEDICINE

## 2024-04-25 RX ORDER — NALOXONE HYDROCHLORIDE 0.4 MG/ML
0.2 INJECTION, SOLUTION INTRAMUSCULAR; INTRAVENOUS; SUBCUTANEOUS
Status: DISCONTINUED | OUTPATIENT
Start: 2024-04-25 | End: 2024-04-29 | Stop reason: HOSPADM

## 2024-04-25 RX ORDER — NALOXONE HYDROCHLORIDE 0.4 MG/ML
0.4 INJECTION, SOLUTION INTRAMUSCULAR; INTRAVENOUS; SUBCUTANEOUS
Status: DISCONTINUED | OUTPATIENT
Start: 2024-04-25 | End: 2024-04-29 | Stop reason: HOSPADM

## 2024-04-25 RX ORDER — ASPIRIN 81 MG/1
81 TABLET, CHEWABLE ORAL 2 TIMES DAILY
Status: DISCONTINUED | OUTPATIENT
Start: 2024-04-25 | End: 2024-04-29 | Stop reason: HOSPADM

## 2024-04-25 RX ORDER — OXYCODONE HYDROCHLORIDE 10 MG/1
10 TABLET ORAL EVERY 4 HOURS PRN
Status: DISCONTINUED | OUTPATIENT
Start: 2024-04-25 | End: 2024-04-29 | Stop reason: HOSPADM

## 2024-04-25 RX ORDER — METHOCARBAMOL 750 MG/1
750 TABLET, FILM COATED ORAL 4 TIMES DAILY PRN
Status: DISCONTINUED | OUTPATIENT
Start: 2024-04-25 | End: 2024-04-29

## 2024-04-25 RX ORDER — BUDESONIDE AND FORMOTEROL FUMARATE DIHYDRATE 160; 4.5 UG/1; UG/1
2 AEROSOL RESPIRATORY (INHALATION) 2 TIMES DAILY
Status: DISCONTINUED | OUTPATIENT
Start: 2024-04-25 | End: 2024-04-29 | Stop reason: HOSPADM

## 2024-04-25 RX ADMIN — SODIUM CHLORIDE: 9 INJECTION, SOLUTION INTRAVENOUS at 18:37

## 2024-04-25 RX ADMIN — OXYCODONE HYDROCHLORIDE 10 MG: 10 TABLET ORAL at 18:36

## 2024-04-25 RX ADMIN — METHOCARBAMOL 750 MG: 750 TABLET ORAL at 14:05

## 2024-04-25 RX ADMIN — ACETAMINOPHEN 975 MG: 325 TABLET, FILM COATED ORAL at 22:03

## 2024-04-25 RX ADMIN — HYDROMORPHONE HYDROCHLORIDE 1 MG: 1 INJECTION, SOLUTION INTRAMUSCULAR; INTRAVENOUS; SUBCUTANEOUS at 11:14

## 2024-04-25 RX ADMIN — HYDROMORPHONE HYDROCHLORIDE 1 MG: 1 INJECTION, SOLUTION INTRAMUSCULAR; INTRAVENOUS; SUBCUTANEOUS at 01:33

## 2024-04-25 RX ADMIN — ACETAMINOPHEN 975 MG: 325 TABLET, FILM COATED ORAL at 14:05

## 2024-04-25 RX ADMIN — ASPIRIN 81 MG CHEWABLE TABLET 81 MG: 81 TABLET CHEWABLE at 19:55

## 2024-04-25 RX ADMIN — BUDESONIDE AND FORMOTEROL FUMARATE DIHYDRATE 2 PUFF: 160; 4.5 AEROSOL RESPIRATORY (INHALATION) at 19:55

## 2024-04-25 RX ADMIN — HYDROMORPHONE HYDROCHLORIDE 1 MG: 1 INJECTION, SOLUTION INTRAMUSCULAR; INTRAVENOUS; SUBCUTANEOUS at 22:03

## 2024-04-25 RX ADMIN — ACETAMINOPHEN 975 MG: 325 TABLET, FILM COATED ORAL at 04:46

## 2024-04-25 RX ADMIN — HYDROMORPHONE HYDROCHLORIDE 1 MG: 1 INJECTION, SOLUTION INTRAMUSCULAR; INTRAVENOUS; SUBCUTANEOUS at 17:47

## 2024-04-25 RX ADMIN — OXYCODONE HYDROCHLORIDE 10 MG: 10 TABLET ORAL at 14:05

## 2024-04-25 RX ADMIN — HYDROMORPHONE HYDROCHLORIDE 1 MG: 1 INJECTION, SOLUTION INTRAMUSCULAR; INTRAVENOUS; SUBCUTANEOUS at 15:23

## 2024-04-25 RX ADMIN — HYDROMORPHONE HYDROCHLORIDE 1 MG: 1 INJECTION, SOLUTION INTRAMUSCULAR; INTRAVENOUS; SUBCUTANEOUS at 19:55

## 2024-04-25 RX ADMIN — SODIUM CHLORIDE: 9 INJECTION, SOLUTION INTRAVENOUS at 08:43

## 2024-04-25 RX ADMIN — HYDROMORPHONE HYDROCHLORIDE 1 MG: 1 INJECTION, SOLUTION INTRAMUSCULAR; INTRAVENOUS; SUBCUTANEOUS at 04:46

## 2024-04-25 RX ADMIN — HYDROMORPHONE HYDROCHLORIDE 1 MG: 1 INJECTION, SOLUTION INTRAMUSCULAR; INTRAVENOUS; SUBCUTANEOUS at 08:08

## 2024-04-25 RX ADMIN — OMEPRAZOLE 20 MG: 20 CAPSULE, DELAYED RELEASE ORAL at 14:05

## 2024-04-25 ASSESSMENT — ACTIVITIES OF DAILY LIVING (ADL)
ADLS_ACUITY_SCORE: 40

## 2024-04-25 NOTE — PROGRESS NOTES
/63 (BP Location: Left arm)   Pulse 59   Temp 97.9  F (36.6  C) (Oral)   Resp 16   LMP  (LMP Unknown)   SpO2 97%      Pt is alert&Ox4, call light within reach. Oxy 3L  Able to make needs known. Pt denies chest pain, sob, and n/t. C/o of nausea, IV zofran was given. Skin intact. Pt had L port cath infusing 100ml/hr. Pt pain managed with dilaudid Q3hr. Pt continent bowel and bladder. Uses commode at bedside. Eating and drinking adequately.     P:continue with plan of care

## 2024-04-25 NOTE — TELEPHONE ENCOUNTER
Patient is being added retrospectively into a [facility] collaboration . I see she is unfortunately currently in the hospital again. I will close this and ask the team to look into her care timing.   [id number]

## 2024-04-25 NOTE — PLAN OF CARE
/76 (BP Location: Right arm, Patient Position: Semi-Short's, Cuff Size: Adult Regular)   Pulse 59   Temp 97.9  F (36.6  C) (Oral)   Resp 15   LMP  (LMP Unknown)   SpO2 98%    No acute events overnight  On 3L oxy mask. Tried weaning to 2 but she desatted to mid 80s.  VSS. A.O x 4   Pain managed with q3hr Dilaudid and scheduled tylenol  SBA to BSC-Cont BB. C/o of  generalized weakness  Port a cath infusing cont NS at 100ml/hr  Reg diet.

## 2024-04-26 LAB
CREAT SERPL-MCNC: 0.49 MG/DL (ref 0.51–0.95)
EGFRCR SERPLBLD CKD-EPI 2021: >90 ML/MIN/1.73M2
PLATELET # BLD AUTO: 393 10E3/UL (ref 150–450)
POTASSIUM SERPL-SCNC: 4.4 MMOL/L (ref 3.4–5.3)

## 2024-04-26 PROCEDURE — 84132 ASSAY OF SERUM POTASSIUM: CPT | Performed by: INTERNAL MEDICINE

## 2024-04-26 PROCEDURE — 99233 SBSQ HOSP IP/OBS HIGH 50: CPT | Performed by: INTERNAL MEDICINE

## 2024-04-26 PROCEDURE — 85049 AUTOMATED PLATELET COUNT: CPT | Performed by: INTERNAL MEDICINE

## 2024-04-26 PROCEDURE — 120N000002 HC R&B MED SURG/OB UMMC

## 2024-04-26 PROCEDURE — 258N000003 HC RX IP 258 OP 636: Performed by: INTERNAL MEDICINE

## 2024-04-26 PROCEDURE — 250N000012 HC RX MED GY IP 250 OP 636 PS 637: Performed by: INTERNAL MEDICINE

## 2024-04-26 PROCEDURE — 250N000011 HC RX IP 250 OP 636: Performed by: INTERNAL MEDICINE

## 2024-04-26 PROCEDURE — 82565 ASSAY OF CREATININE: CPT | Performed by: INTERNAL MEDICINE

## 2024-04-26 PROCEDURE — 36591 DRAW BLOOD OFF VENOUS DEVICE: CPT | Performed by: INTERNAL MEDICINE

## 2024-04-26 PROCEDURE — 250N000013 HC RX MED GY IP 250 OP 250 PS 637: Performed by: INTERNAL MEDICINE

## 2024-04-26 RX ORDER — PREDNISONE 20 MG/1
40 TABLET ORAL DAILY
Status: DISCONTINUED | OUTPATIENT
Start: 2024-04-26 | End: 2024-04-29 | Stop reason: HOSPADM

## 2024-04-26 RX ADMIN — BUDESONIDE AND FORMOTEROL FUMARATE DIHYDRATE 2 PUFF: 160; 4.5 AEROSOL RESPIRATORY (INHALATION) at 09:03

## 2024-04-26 RX ADMIN — ACETAMINOPHEN 975 MG: 325 TABLET, FILM COATED ORAL at 14:23

## 2024-04-26 RX ADMIN — HYDROMORPHONE HYDROCHLORIDE 1 MG: 1 INJECTION, SOLUTION INTRAMUSCULAR; INTRAVENOUS; SUBCUTANEOUS at 19:04

## 2024-04-26 RX ADMIN — ACETAMINOPHEN 975 MG: 325 TABLET, FILM COATED ORAL at 22:22

## 2024-04-26 RX ADMIN — SODIUM CHLORIDE: 9 INJECTION, SOLUTION INTRAVENOUS at 05:10

## 2024-04-26 RX ADMIN — HYDROMORPHONE HYDROCHLORIDE 1 MG: 1 INJECTION, SOLUTION INTRAMUSCULAR; INTRAVENOUS; SUBCUTANEOUS at 05:58

## 2024-04-26 RX ADMIN — ACETAMINOPHEN 975 MG: 325 TABLET, FILM COATED ORAL at 09:03

## 2024-04-26 RX ADMIN — HYDROMORPHONE HYDROCHLORIDE 1 MG: 1 INJECTION, SOLUTION INTRAMUSCULAR; INTRAVENOUS; SUBCUTANEOUS at 23:12

## 2024-04-26 RX ADMIN — HYDROMORPHONE HYDROCHLORIDE 1 MG: 1 INJECTION, SOLUTION INTRAMUSCULAR; INTRAVENOUS; SUBCUTANEOUS at 21:01

## 2024-04-26 RX ADMIN — OMEPRAZOLE 20 MG: 20 CAPSULE, DELAYED RELEASE ORAL at 08:51

## 2024-04-26 RX ADMIN — OXYCODONE HYDROCHLORIDE 10 MG: 10 TABLET ORAL at 01:02

## 2024-04-26 RX ADMIN — HYDROMORPHONE HYDROCHLORIDE 1 MG: 1 INJECTION, SOLUTION INTRAMUSCULAR; INTRAVENOUS; SUBCUTANEOUS at 14:23

## 2024-04-26 RX ADMIN — HYDROMORPHONE HYDROCHLORIDE 1 MG: 1 INJECTION, SOLUTION INTRAMUSCULAR; INTRAVENOUS; SUBCUTANEOUS at 03:20

## 2024-04-26 RX ADMIN — ASPIRIN 81 MG CHEWABLE TABLET 81 MG: 81 TABLET CHEWABLE at 21:01

## 2024-04-26 RX ADMIN — BUDESONIDE AND FORMOTEROL FUMARATE DIHYDRATE 2 PUFF: 160; 4.5 AEROSOL RESPIRATORY (INHALATION) at 22:22

## 2024-04-26 RX ADMIN — HYDROMORPHONE HYDROCHLORIDE 1 MG: 1 INJECTION, SOLUTION INTRAMUSCULAR; INTRAVENOUS; SUBCUTANEOUS at 11:56

## 2024-04-26 RX ADMIN — ONDANSETRON 4 MG: 2 INJECTION INTRAMUSCULAR; INTRAVENOUS at 01:02

## 2024-04-26 RX ADMIN — HYDROMORPHONE HYDROCHLORIDE 1 MG: 1 INJECTION, SOLUTION INTRAMUSCULAR; INTRAVENOUS; SUBCUTANEOUS at 08:51

## 2024-04-26 RX ADMIN — OXYCODONE HYDROCHLORIDE 10 MG: 10 TABLET ORAL at 05:05

## 2024-04-26 RX ADMIN — PREDNISONE 40 MG: 20 TABLET ORAL at 16:12

## 2024-04-26 RX ADMIN — ASPIRIN 81 MG CHEWABLE TABLET 81 MG: 81 TABLET CHEWABLE at 08:51

## 2024-04-26 RX ADMIN — HYDROMORPHONE HYDROCHLORIDE 1 MG: 1 INJECTION, SOLUTION INTRAMUSCULAR; INTRAVENOUS; SUBCUTANEOUS at 00:09

## 2024-04-26 ASSESSMENT — ACTIVITIES OF DAILY LIVING (ADL)
ADLS_ACUITY_SCORE: 40
ADLS_ACUITY_SCORE: 40
ADLS_ACUITY_SCORE: 39
ADLS_ACUITY_SCORE: 39
ADLS_ACUITY_SCORE: 40
ADLS_ACUITY_SCORE: 39
ADLS_ACUITY_SCORE: 40

## 2024-04-26 NOTE — PLAN OF CARE
"/71 (BP Location: Left arm)   Pulse 86   Temp 98.2  F (36.8  C) (Oral)   Resp 16   LMP  (LMP Unknown)   SpO2 96%      8920-2353  No acute events overnight  On 3L oxy mask.  VSS. A.O x 4   Pain managed with q2hr Dilaudid and oxy   SBA to BSC-Cont BB. C/o of  generalized weakness  Port a cath infusing cont NS at 100ml/hr  Reg diet.    2200 Writer had gone to give  pain med to this pt  and asked her  if she wanted to take her tylenol also.  This pt yelled at writer saying  \" Shut up and hold on can you not see I am on the phone with my sister\"  This writer educated pt on importance of maintaining mutual respect.   Handed pt to JOE de souza at 2300.   "

## 2024-04-26 NOTE — PROGRESS NOTES
[facility]    Medicine Progress Note - Hospitalist Service, GOLD TEAM 20    Date of Admission:  4/23/2024    Assessment & Plan   Jennifer Cervantes is a 24 year old female admitted on 4/23/2024 for evaluation of persistent shortness of breath and hypoxia. Patient has a past medical history significant for sickle cell disease and spastic hemiplegia.     Patient is stated not feeling well approximately 2 days ago, she was seen at the infusion center yesterday and received IV pain medication due to concern for possible sickle cell disease flare, she also was noticed with persistent hypoxia with O2 sats in the mid 80s.  Patient also was found with a hemoglobin of 6.1.  Patient was evaluated at the emergency department and she had a VQ scan that was negative for acute PE (CT chest with contrast was not done because is allergic to contrast).  Patient was discharged home but she presented again to the infusion center today due to persistent shortness of breath, malaise, nausea and vomiting.  Patient was found with persistent hypoxia today and was placed on O2 nasal cannula.  Patient also received 1 unit of red blood cells and hemoglobin went up to 8.1.    Patient was referred again to the ER for evaluation of persistent hypoxia.  Patient denies any fever, productive cough, upper respiratory symptoms, chills, diaphoresis, dysuria, abdominal pain or diarrhea.  Patient also denies any sick contacts.  At the emergency department chest x-ray showed no acute infiltrate.  Respiratory viral panel was negative for COVID or influenza.  White blood cell count was normal.  Patient was admitted to the hospital for workup of acute hypoxic respiratory failure.    # Acute hypoxic respiratory failure  - No clear etiology for persistent hypoxia at this time.  CT chest was done at the emergency department and waiting for official report from radiology.  Follow-up results.  - Continue on O2 nasal  cannula for now.  - Check procalcitonin.  Hold antibiotics for now patient is afebrile and normal white blood cell count at this moment.  - DuoNebs as needed.  - Continue on PTA Symbicort.  -Concern exists for pulmonary hypertension  - Check 2D echocardiogram per hematology ANDREAS    # Sickle cell disease  - Hematology consult in the morning.  - Continue on IV Dilaudid for acute pain, patient endorsed good response from previous doses of IV Dilaudid.  - Continue monitoring hemoglobin, no further transfusions for now.  - Continue on gentle hydration for now.           Diet: Combination Diet Regular Diet Adult    DVT Prophylaxis: Aspirin 81 mg p.o. twice daily  Lopez Catheter: Not present  Lines: PRESENT      Port A Cath Single 04/21/21 Left Chest wall-Site Assessment: WDL      Cardiac Monitoring: None  Code Status: Full Code      Clinically Significant Risk Factors               # Coagulation Defect: INR = 1.25 (Ref range: 0.85 - 1.15) and/or PTT = 26 Seconds (Ref range: 22 - 38 Seconds), will monitor for bleeding               # Financial/Environmental Concerns: none  # Asthma: noted on problem list        Disposition Plan     Medically Ready for Discharge: Anticipated in 2-4 Days             Jace Mendoza DO, VERNON  Hospitalist Service, GOLD TEAM 20  [facility]  Securely message with [employer] (more info)  Text page via [employer] Paging/Directory   See signed in provider for up to date coverage information  ______________________________________________________________________    Interval History   Patient reports work of breathing acutely exacerbated this morning.  Patient reports since then, work of breathing is improved.  Patient is still requiring supplemental oxygen.  Concern exists for pulmonary hypertension per hematology ANDREAS.  Check 2D echocardiogram.    Physical Exam   Vital Signs: Temp: 98.2  F (36.8  C) Temp src: Oral BP: 126/71 Pulse: 86   Resp: 16 SpO2: 96 % O2 Device:  Oxymask Oxygen Delivery: 2 LPM  Weight: 0 lbs 0 oz    GENERAL: Alert and oriented x 3; no acute distress; well-nourished.  HEENT: Normocephalic; atraumatic; PERRLA; MMM.  CV: RRR; normal S1, S2; left chest port.  RESP: Lung fields clear to aucultation B/L; no wheezing or crepitations.  GI: Abdomen is soft, nontender, nondistended; no organomegaly; normal bowel sounds.  : Deferred genital examination.   MSK: No clubbing, cyanosis, or edema.  DERM: Skin is intact; no rash, lesions, or skin breakdown.  NEURO: No focal deficits appreciated; strength & sensorium are grossly intact.  PSYCH: No active hallucinations; affect, insight appear within normal limits.    Medical Decision Making       55 MINUTES SPENT BY ME on the date of service doing chart review, history, exam, documentation & further activities per the note.      Data     I have personally reviewed the following data over the past 24 hrs:    N/A  \   N/A   / N/A     N/A N/A N/A /  N/A   4.0 N/A N/A \       Imaging results reviewed over the past 24 hrs:   Recent Results (from the past 24 hour(s))   Echo Complete   Result Value    LVEF  55-60%    Narrative    [id number]  [id number]  [id number]  [id number]^GIGI^SUNSHINE^ABELINO     [facility]  Echocardiography Laboratory  [address] [place] [id number]     Name: HIMANSHU AL  MRN: [id number]  : 2000  Study Date: 2024 01:41 PM  Age: 24 yrs  Gender: Female  Patient Location: [facility] [[email or web address]]  Reason For Study: SOB  Ordering Physician: SUNSHINE YU  Performed By: Jessi Salas RDCS     BSA: 1.7 m2  Height: 64 in  Weight: 152 lb  HR: 71  BP: 112/81 mmHg  ______________________________________________________________________________  Procedure  Complete Portable Echo Adult. Echocardiogram with two-dimensional, color and  spectral Doppler performed.  ______________________________________________________________________________  Interpretation Summary  Global and regional  left ventricular function is normal with an EF of 55-60%.  Global right ventricular function is normal.  No significant valvular abnormalities present.  Pulmonary artery systolic pressure is normal.  Estimated mean right atrial pressure is normal.  No pericardial effusion is present.  ______________________________________________________________________________  Left Ventricle  Left ventricular size is normal. Left ventricular wall thickness is normal.  Global and regional left ventricular function is normal with an EF of 55-60%.  No regional wall motion abnormalities are seen.     Right Ventricle  The right ventricle is normal size. Global right ventricular function is  normal.     Atria  Both atria appear normal.     Mitral Valve  The mitral valve is normal.     Aortic Valve  Aortic valve is normal in structure and function.     Tricuspid Valve  The tricuspid valve is normal. Trace tricuspid insufficiency is present. The  right ventricular systolic pressure is approximated at 28.1 mmHg plus the  right atrial pressure. Pulmonary artery systolic pressure is normal.     Pulmonic Valve  The pulmonic valve is normal.     Vessels  The aorta root is normal. The inferior vena cava is normal. Estimated mean  right atrial pressure is normal.     Pericardium  No pericardial effusion is present.     Miscellaneous  No significant valvular abnormalities present.     Compared to Previous Study  Previous study not available for comparison.  ______________________________________________________________________________  MMode/2D Measurements & Calculations  IVSd: 0.88 cm  LVIDd: 5.4 cm  LVIDs: 3.8 cm  LVPWd: 0.90 cm  FS: 29.6 %  LV mass(C)d: 175.5 grams  LV mass(C)dI: 100.8 grams/m2  Ao root diam: 2.9 cm  asc Aorta Diam: 2.9 cm  LVOT diam: 2.2 cm  LVOT area: 3.8 cm2  Ao root diam index Ht(cm/m): 1.8  Ao root diam index BSA (cm/m2): 1.7  Asc Ao diam index BSA (cm/m2): 1.7  Asc Ao diam index Ht(cm/m): 1.8  LA Volume (BP): 55.0 ml      LA Volume Index (BP): 31.6 ml/m2  RV Base: 2.9 cm  RWT: 0.33  TAPSE: 2.8 cm     Doppler Measurements & Calculations  MV E max ashkan: 135.0 cm/sec  MV A max ashkan: 99.4 cm/sec  MV E/A: 1.4  MV dec time: 0.19 sec  PA acc time: 0.11 sec  TR max ashkan: 265.0 cm/sec  TR max P.1 mmHg  E/E' avg: 10.3     Lateral E/e': 8.9  Medial E/e': 11.6  RV S Ashkan: 14.0 cm/sec     ______________________________________________________________________________  Report approved by: Chava Naylor 2024 03:44 PM

## 2024-04-26 NOTE — PLAN OF CARE
Goal Outcome Evaluation:      Plan of Care Reviewed With: patient    Overall Patient Progress: improvingOverall Patient Progress: improving    Pt remains alert and oriented x4. Been doing well on 2L via Oxymask.     Rates back pain @7-8/10; managed with PRN dilaudid, oxycodone and Robaxin.    Continent of B&B; using bedside commode. LBM 4/25.    Left PIV infusing NS @100ml/hr.

## 2024-04-26 NOTE — PLAN OF CARE
"Goal Outcome Evaluation:    Alert and oriented X 4, continent of bowel and bladder, voiding adequate amount without difficulty in the bedside commode, no BM this shift. Stated Shortness of breath improving, complaint of pain in lower back. Rating pain 9-10/10 on pain scale. Pain managed with oral dilaudid, oxycodone,Tylenol and Robaxin.      Patient called, the  NST answered  and asked how may I assist you?. Pt Stated \"I want my nurse\" the nursing assistant asked the pt if there is anything  she would like .She stated \" I want my nurse\". Nurse went to the room and the pt was asking about her dilaudid. Nurse informed the pt that she gets the dilaudid every two hours as needed. Writer explained to the patient  it would be time efficient  to let nursing staff know what she wants or needs on the call instead of having the nurse go to the room first.The pt got upset and told writer \"you don't have to be rude.\" Nurse, you don't tell me Rude. She had her mother on the phone. She stated \"MOM you hear her. Even If mom is on the phone, you don't tell me rude. Pt requested to see charge nurse, writer called the CN. CN came immediately to the pt room but pt waited for the nurse to leave before talking with the CN. Pain med was given as requested. Continue POC.    "

## 2024-04-26 NOTE — PROGRESS NOTES
[facility]    Medicine Progress Note - Hospitalist Service, GOLD TEAM 16    Date of Admission:  4/23/2024    Assessment & Plan   A: Patient is a 25 y/o woman who has a past medical history significant for sickle cell disease, history of acute chest syndrome, iron overload secondary to frequent transfusions, history of CVA, moderate persistent asthma, depression and history of recurrent VTE/PE. Patient initially presented on 22-Apr-2024, after being sent from infusion clinic, with dyspnea and hypoxemia. Patient could not receive IV contrast due to allergy and underwent V/Q scan which was negative for PE. Patient was discharged to home on 22-Apr-2023. Patient presented to infusion clinic on 23-Apr-2024 as an add-on for IV fluids and pain medications. Patient was again found to be hypoxemic on room air. Patient received 1 unit of pRBCs without improvement in symptoms. As patient had persistent hypoxemia, patient was sent to the ED. Patient was admitted to the hospital on 23-Apr-2024 for acute hypoxemic respiratory failure.    Cause of acute hypoxemic respiratory failure is not fully clear at this point. CT chest on 23-Apr-2023 only showed mild dependent atelectasis bilaterally and showed no other acute abnormality. Covid-19, influenza and RSV tests were negative. Patient having a cough raises asthma exacerbation as a possibility.     P:  1.) Acute hypoxemic respiratory failure:  - Oxygen support as needed.    2.) Sickle cell disease:  - Patient on IV hydromorphone and oxycodone as needed for pain.  - Hematology seeing.  - Stopping IV fluids for now.    3.) Moderate persistent asthma with possible exacerbation:  - Patient to be on prednisone 40 mg daily for now. Monitoring response.  - Patient usually on symbicort which is being continued. Duonebs as needed.    4.) History of CVA:  - Patient on aspirin 81 mg twice daily.    5.) Personal history of VTE and PE:  - Pneumatic  compression device ordered.          Diet: Combination Diet Regular Diet Adult    Lopez Catheter: Not present  Lines: PRESENT             Cardiac Monitoring: None  Code Status: Full Code      Clinically Significant Risk Factors                             # Financial/Environmental Concerns: none  # Asthma: noted on problem list            Chirag Johnson MD  Hospitalist Service, GOLD TEAM 16  [facility]  Securely message with [employer] (more info)  Text page via [employer] Paging/Directory   See signed in provider for up to date coverage information  ______________________________________________________________________    Interval History   Patient noted still having dyspnea and dry cough. Patient noted no wheezing, no chest tightness and no pleurisy. Patient noted no fever and no chills.    Patient noted pain that is currently controlled with IV hydromorphone. Patient noted that she has pain following transfusions normally.    Physical Exam   Vital Signs: Temp: 98.1  F (36.7  C) Temp src: Oral BP: 118/70 Pulse: 80   Resp: 18 SpO2: 94 % O2 Device: Oxymask Oxygen Delivery: 2 LPM  Weight: 0 lbs 0 oz    General: Patient comfortable, NAD.  Heart: RRR, S1 S2 w/o murmurs.  Lungs: Breath sounds present. No overt wheezes heard. Some crackles in both lung bases.  Abdomen: Soft, nontender.  Extremities: No pitting edema in legs.     Medical Decision Making             Data

## 2024-04-27 LAB
ANION GAP SERPL CALCULATED.3IONS-SCNC: 12 MMOL/L (ref 7–15)
BUN SERPL-MCNC: 4.9 MG/DL (ref 6–20)
CALCIUM SERPL-MCNC: 8.8 MG/DL (ref 8.6–10)
CHLORIDE SERPL-SCNC: 100 MMOL/L (ref 98–107)
CREAT SERPL-MCNC: 0.4 MG/DL (ref 0.51–0.95)
DEPRECATED HCO3 PLAS-SCNC: 25 MMOL/L (ref 22–29)
EGFRCR SERPLBLD CKD-EPI 2021: >90 ML/MIN/1.73M2
ERYTHROCYTE [DISTWIDTH] IN BLOOD BY AUTOMATED COUNT: 20.1 % (ref 10–15)
GLUCOSE SERPL-MCNC: 96 MG/DL (ref 70–99)
HCT VFR BLD AUTO: 21.7 % (ref 35–47)
HGB BLD-MCNC: 7.7 G/DL (ref 11.7–15.7)
MAGNESIUM SERPL-MCNC: 1.7 MG/DL (ref 1.7–2.3)
MCH RBC QN AUTO: 30 PG (ref 26.5–33)
MCHC RBC AUTO-ENTMCNC: 35.5 G/DL (ref 31.5–36.5)
MCV RBC AUTO: 84 FL (ref 78–100)
PLATELET # BLD AUTO: 396 10E3/UL (ref 150–450)
POTASSIUM SERPL-SCNC: 3.9 MMOL/L (ref 3.4–5.3)
RBC # BLD AUTO: 2.57 10E6/UL (ref 3.8–5.2)
SODIUM SERPL-SCNC: 137 MMOL/L (ref 135–145)
WBC # BLD AUTO: 8.2 10E3/UL (ref 4–11)

## 2024-04-27 PROCEDURE — 250N000011 HC RX IP 250 OP 636: Performed by: INTERNAL MEDICINE

## 2024-04-27 PROCEDURE — 36592 COLLECT BLOOD FROM PICC: CPT | Performed by: INTERNAL MEDICINE

## 2024-04-27 PROCEDURE — 250N000013 HC RX MED GY IP 250 OP 250 PS 637: Performed by: INTERNAL MEDICINE

## 2024-04-27 PROCEDURE — 99233 SBSQ HOSP IP/OBS HIGH 50: CPT | Performed by: INTERNAL MEDICINE

## 2024-04-27 PROCEDURE — 85014 HEMATOCRIT: CPT | Performed by: INTERNAL MEDICINE

## 2024-04-27 PROCEDURE — 83735 ASSAY OF MAGNESIUM: CPT | Performed by: INTERNAL MEDICINE

## 2024-04-27 PROCEDURE — 120N000002 HC R&B MED SURG/OB UMMC

## 2024-04-27 PROCEDURE — 250N000012 HC RX MED GY IP 250 OP 636 PS 637: Performed by: INTERNAL MEDICINE

## 2024-04-27 PROCEDURE — 80048 BASIC METABOLIC PNL TOTAL CA: CPT | Performed by: INTERNAL MEDICINE

## 2024-04-27 RX ADMIN — HYDROMORPHONE HYDROCHLORIDE 1 MG: 1 INJECTION, SOLUTION INTRAMUSCULAR; INTRAVENOUS; SUBCUTANEOUS at 19:25

## 2024-04-27 RX ADMIN — HYDROMORPHONE HYDROCHLORIDE 1 MG: 1 INJECTION, SOLUTION INTRAMUSCULAR; INTRAVENOUS; SUBCUTANEOUS at 03:34

## 2024-04-27 RX ADMIN — HYDROMORPHONE HYDROCHLORIDE 1 MG: 1 INJECTION, SOLUTION INTRAMUSCULAR; INTRAVENOUS; SUBCUTANEOUS at 14:42

## 2024-04-27 RX ADMIN — HYDROMORPHONE HYDROCHLORIDE 1 MG: 1 INJECTION, SOLUTION INTRAMUSCULAR; INTRAVENOUS; SUBCUTANEOUS at 17:20

## 2024-04-27 RX ADMIN — ACETAMINOPHEN 975 MG: 325 TABLET, FILM COATED ORAL at 14:42

## 2024-04-27 RX ADMIN — ASPIRIN 81 MG CHEWABLE TABLET 81 MG: 81 TABLET CHEWABLE at 08:01

## 2024-04-27 RX ADMIN — ACETAMINOPHEN 975 MG: 325 TABLET, FILM COATED ORAL at 22:46

## 2024-04-27 RX ADMIN — HYDROMORPHONE HYDROCHLORIDE 1 MG: 1 INJECTION, SOLUTION INTRAMUSCULAR; INTRAVENOUS; SUBCUTANEOUS at 01:34

## 2024-04-27 RX ADMIN — HYDROMORPHONE HYDROCHLORIDE 1 MG: 1 INJECTION, SOLUTION INTRAMUSCULAR; INTRAVENOUS; SUBCUTANEOUS at 21:31

## 2024-04-27 RX ADMIN — HYDROMORPHONE HYDROCHLORIDE 1 MG: 1 INJECTION, SOLUTION INTRAMUSCULAR; INTRAVENOUS; SUBCUTANEOUS at 12:36

## 2024-04-27 RX ADMIN — HYDROMORPHONE HYDROCHLORIDE 1 MG: 1 INJECTION, SOLUTION INTRAMUSCULAR; INTRAVENOUS; SUBCUTANEOUS at 10:11

## 2024-04-27 RX ADMIN — BUDESONIDE AND FORMOTEROL FUMARATE DIHYDRATE 2 PUFF: 160; 4.5 AEROSOL RESPIRATORY (INHALATION) at 19:31

## 2024-04-27 RX ADMIN — ASPIRIN 81 MG CHEWABLE TABLET 81 MG: 81 TABLET CHEWABLE at 19:31

## 2024-04-27 RX ADMIN — HYDROMORPHONE HYDROCHLORIDE 1 MG: 1 INJECTION, SOLUTION INTRAMUSCULAR; INTRAVENOUS; SUBCUTANEOUS at 05:53

## 2024-04-27 RX ADMIN — HYDROMORPHONE HYDROCHLORIDE 1 MG: 1 INJECTION, SOLUTION INTRAMUSCULAR; INTRAVENOUS; SUBCUTANEOUS at 08:04

## 2024-04-27 RX ADMIN — BUDESONIDE AND FORMOTEROL FUMARATE DIHYDRATE 2 PUFF: 160; 4.5 AEROSOL RESPIRATORY (INHALATION) at 08:04

## 2024-04-27 RX ADMIN — OMEPRAZOLE 20 MG: 20 CAPSULE, DELAYED RELEASE ORAL at 08:01

## 2024-04-27 RX ADMIN — ACETAMINOPHEN 975 MG: 325 TABLET, FILM COATED ORAL at 05:53

## 2024-04-27 RX ADMIN — PREDNISONE 40 MG: 20 TABLET ORAL at 08:01

## 2024-04-27 ASSESSMENT — ACTIVITIES OF DAILY LIVING (ADL)
ADLS_ACUITY_SCORE: 40
ADLS_ACUITY_SCORE: 39
ADLS_ACUITY_SCORE: 39
ADLS_ACUITY_SCORE: 40
ADLS_ACUITY_SCORE: 42
ADLS_ACUITY_SCORE: 39
ADLS_ACUITY_SCORE: 40
ADLS_ACUITY_SCORE: 42
ADLS_ACUITY_SCORE: 40
ADLS_ACUITY_SCORE: 39
ADLS_ACUITY_SCORE: 40
ADLS_ACUITY_SCORE: 40
ADLS_ACUITY_SCORE: 42

## 2024-04-27 NOTE — PLAN OF CARE
"Goal Outcome Evaluation:           Overall Patient Progress: no changeOverall Patient Progress: no change     24 year old female patient that was admitted for hypoxemia secdondary to sickle cell disease. Patient's vitals have been stable, on 3L O2 with the oxy-mask \"goal is 94% and above\". Alert x4, last BM was on 4/26, up to commode at bedside. Regular diet, stand by assist, no skin issues. Left port \"chest\", full code, contact precaution, allergie to contrast. History of VTE, PE, asthma. Patient is RN managed for Mag, and K+ no correction needed during my shift. Patient reports pain of the lower back \"treated with med's and cold compress\".       "

## 2024-04-27 NOTE — PROGRESS NOTES
VS: /87 (BP Location: Right arm, Patient Position: Sitting, Cuff Size: Adult Regular)   Pulse 90   Temp 98.1  F (36.7  C) (Oral)   Resp 19   LMP  (LMP Unknown)   SpO2 95%      O2: Sating >90% on RA. Lung sounds clear. Denies chest pain and SOB.   Output: Voids spontaneously and adequately.  BSC    Last BM: 4/23 Bowel sounds active x4. Passing flatus.    Activity: Up with SBA assist, FWW, and gait belt.    Skin: No skin issues    Pain: Pain was managed with IV dilaudid    CMS: A&Ox4. Denies N/T.    Dressing: Dressing to  Left port C/D/I.   Diet: Regular. Appetite was good.    LDA: Left port is S/L.    Equipment: IV pole, FWW, gait belt, and personal belongings. Call light within reach and uses appropriately.   Plan:     Additional Info: No acute changes this shift. Fluids stop during this shift. Pain management during  shift. Pt. Appears to call excessively for IV dilaudid and rated pain 5-8/10. Writer encouraged pt. To eat intake poor during shift. Continue POC.

## 2024-04-27 NOTE — PLAN OF CARE
Goal Outcome Evaluation:       End of shift Summary: See flowsheet for VS and detail assessments.    /60 (BP Location: Right arm, Patient Position: Semi-Short's, Cuff Size: Adult Regular)   Pulse 79   Temp 98.1  F (36.7  C) (Oral)   Resp 18   LMP  (LMP Unknown)   SpO2 93%       Changes this Shift:      Pulmonary: On 3L oxy mask. Sating >90% on RA. Lung sounds clear. Denies chest pain and SOB.      Output: Pt uses bedside commode. Voids spontaneously and adequately.  LBM 0n 4/26 per pt report, and passing flatus. Active bowel sounds active x4.     Activity: Steady on her gait. Up with SBA assist, and gait bel.       Skin: Visible skin is intact.     Pain: Pt c/o lower back pain, rated 6-9/10. Pain managed with q 2hr IV Dilaudid and scheduled tylenol. Pt is requesting IV dilaudid Q 2 hours, education was provided of important to report SE of the meds. Ice pack provided.      Neuro/CMS: Pt is alert&Ox4. Calm and cooperative.      Dressings/Drains: Dressing to Left port C/D/I.      IV: Port a cath on the left chest, patent, SL.     Additional info: No acute changes this shift. Sleep and rest promoted. Pt appear to be awake most of the shift, sated family (mother) on the phone all night. Fluid encouraged. Call light within reach, and able to make needs known.     Plan:  Continue Plan Of Care.

## 2024-04-28 LAB
MAGNESIUM SERPL-MCNC: 1.9 MG/DL (ref 1.7–2.3)
POTASSIUM SERPL-SCNC: 3.9 MMOL/L (ref 3.4–5.3)

## 2024-04-28 PROCEDURE — 99231 SBSQ HOSP IP/OBS SF/LOW 25: CPT | Performed by: STUDENT IN AN ORGANIZED HEALTH CARE EDUCATION/TRAINING PROGRAM

## 2024-04-28 PROCEDURE — 250N000013 HC RX MED GY IP 250 OP 250 PS 637: Performed by: INTERNAL MEDICINE

## 2024-04-28 PROCEDURE — 250N000011 HC RX IP 250 OP 636: Performed by: INTERNAL MEDICINE

## 2024-04-28 PROCEDURE — 120N000002 HC R&B MED SURG/OB UMMC

## 2024-04-28 PROCEDURE — 36591 DRAW BLOOD OFF VENOUS DEVICE: CPT | Performed by: INTERNAL MEDICINE

## 2024-04-28 PROCEDURE — 99232 SBSQ HOSP IP/OBS MODERATE 35: CPT | Performed by: INTERNAL MEDICINE

## 2024-04-28 PROCEDURE — 250N000012 HC RX MED GY IP 250 OP 636 PS 637: Performed by: INTERNAL MEDICINE

## 2024-04-28 PROCEDURE — 83735 ASSAY OF MAGNESIUM: CPT | Performed by: INTERNAL MEDICINE

## 2024-04-28 PROCEDURE — 84132 ASSAY OF SERUM POTASSIUM: CPT | Performed by: INTERNAL MEDICINE

## 2024-04-28 RX ADMIN — OXYCODONE HYDROCHLORIDE 10 MG: 10 TABLET ORAL at 15:12

## 2024-04-28 RX ADMIN — ACETAMINOPHEN 975 MG: 325 TABLET, FILM COATED ORAL at 06:20

## 2024-04-28 RX ADMIN — HYDROMORPHONE HYDROCHLORIDE 1 MG: 1 INJECTION, SOLUTION INTRAMUSCULAR; INTRAVENOUS; SUBCUTANEOUS at 14:13

## 2024-04-28 RX ADMIN — ACETAMINOPHEN 975 MG: 325 TABLET, FILM COATED ORAL at 22:15

## 2024-04-28 RX ADMIN — HYDROMORPHONE HYDROCHLORIDE 1 MG: 1 INJECTION, SOLUTION INTRAMUSCULAR; INTRAVENOUS; SUBCUTANEOUS at 11:04

## 2024-04-28 RX ADMIN — PREDNISONE 40 MG: 20 TABLET ORAL at 08:57

## 2024-04-28 RX ADMIN — HYDROMORPHONE HYDROCHLORIDE 1 MG: 1 INJECTION, SOLUTION INTRAMUSCULAR; INTRAVENOUS; SUBCUTANEOUS at 06:20

## 2024-04-28 RX ADMIN — ASPIRIN 81 MG CHEWABLE TABLET 81 MG: 81 TABLET CHEWABLE at 08:43

## 2024-04-28 RX ADMIN — BUDESONIDE AND FORMOTEROL FUMARATE DIHYDRATE 2 PUFF: 160; 4.5 AEROSOL RESPIRATORY (INHALATION) at 19:18

## 2024-04-28 RX ADMIN — HYDROMORPHONE HYDROCHLORIDE 1 MG: 1 INJECTION, SOLUTION INTRAMUSCULAR; INTRAVENOUS; SUBCUTANEOUS at 04:11

## 2024-04-28 RX ADMIN — HYDROMORPHONE HYDROCHLORIDE 1 MG: 1 INJECTION, SOLUTION INTRAMUSCULAR; INTRAVENOUS; SUBCUTANEOUS at 08:45

## 2024-04-28 RX ADMIN — BUDESONIDE AND FORMOTEROL FUMARATE DIHYDRATE 2 PUFF: 160; 4.5 AEROSOL RESPIRATORY (INHALATION) at 08:40

## 2024-04-28 RX ADMIN — HYDROMORPHONE HYDROCHLORIDE 1 MG: 1 INJECTION, SOLUTION INTRAMUSCULAR; INTRAVENOUS; SUBCUTANEOUS at 23:50

## 2024-04-28 RX ADMIN — ACETAMINOPHEN 975 MG: 325 TABLET, FILM COATED ORAL at 15:09

## 2024-04-28 RX ADMIN — OXYCODONE HYDROCHLORIDE 10 MG: 10 TABLET ORAL at 19:18

## 2024-04-28 RX ADMIN — ASPIRIN 81 MG CHEWABLE TABLET 81 MG: 81 TABLET CHEWABLE at 19:18

## 2024-04-28 RX ADMIN — HYDROMORPHONE HYDROCHLORIDE 1 MG: 1 INJECTION, SOLUTION INTRAMUSCULAR; INTRAVENOUS; SUBCUTANEOUS at 17:29

## 2024-04-28 RX ADMIN — HYDROMORPHONE HYDROCHLORIDE 1 MG: 1 INJECTION, SOLUTION INTRAMUSCULAR; INTRAVENOUS; SUBCUTANEOUS at 00:06

## 2024-04-28 RX ADMIN — OMEPRAZOLE 20 MG: 20 CAPSULE, DELAYED RELEASE ORAL at 08:44

## 2024-04-28 RX ADMIN — HYDROMORPHONE HYDROCHLORIDE 1 MG: 1 INJECTION, SOLUTION INTRAMUSCULAR; INTRAVENOUS; SUBCUTANEOUS at 20:38

## 2024-04-28 RX ADMIN — HYDROMORPHONE HYDROCHLORIDE 1 MG: 1 INJECTION, SOLUTION INTRAMUSCULAR; INTRAVENOUS; SUBCUTANEOUS at 02:13

## 2024-04-28 ASSESSMENT — ACTIVITIES OF DAILY LIVING (ADL)
ADLS_ACUITY_SCORE: 40
ADLS_ACUITY_SCORE: 39
ADLS_ACUITY_SCORE: 40
ADLS_ACUITY_SCORE: 39
ADLS_ACUITY_SCORE: 40
ADLS_ACUITY_SCORE: 39
ADLS_ACUITY_SCORE: 40
ADLS_ACUITY_SCORE: 39

## 2024-04-28 NOTE — PLAN OF CARE
Goal Outcome Evaluation:  Vitally stable, alert and oriented X4. Pain managed with IV dilaudid. Refused oxycodone when offered to her. Plan of care to be continued.

## 2024-04-28 NOTE — PROGRESS NOTES
Hematology follow-up note    24-year-old woman with sickle cell anemia (Hb SS), prior CVA with residual hemiparesis and cognitive delay, asthma, recurrent VTE's, admitted with hypoxemia and pain crisis.    In terms of her pain, she remains on hydromorphone 1 mg every 2 hours IV and oral oxycodone 10 mg every 4 hours, both as needed.  He has been receiving the Dilaudid roughly every 2 hours since admission.  She has not received oxycodone since April 26.  At home she takes oxycodone 15 mg every 4-6 hours as needed.  Primary team will discuss with patient weaning IV Dilaudid and transitioning to home oxycodone.    In terms of her hypoxia, she was started on oral prednisone and continues to receive as needed nebulizers.  Less oxygen required today, primary team attempting to wean to room air.    On interview, trying to sleep. O2 sat 93-95% on room air.    Jacob Cogan, MD  Hematology    30 minutes spent by me on the date of the encounter doing chart review, history and exam, documentation and further activities per the note

## 2024-04-28 NOTE — CARE PLAN
End of shift Summary: See flowsheet for VS and detail assessments.  Changes this Shift:   Pulmonary: Pt is sating adequately on 1 L of O2 via oxymask.   Output: Last BM 4/27. Pt uses bedside commode.  Activity: Standby assist  Skin: No skin issues. Intact.  Pain: Pt rates pain 7/10 managed with PRNs look at MAR.  Neuro/CMS: Alert and Oriented x 4.  Dressings/Drains: Left chest port  IV: None  Additional info:Regular diet.  Plan: Pain management

## 2024-04-28 NOTE — CARE PLAN
End of shift Summary: See flowsheet for VS and detail assessments.  Changes this Shift:   Pulmonary:Pt is sating adequately on 3 L of O2 via Oxymask.  Output: Last BM 4/26. Pt uses bedside commode.  Activity:Standby assist.  Skin: Intact.  Pain: Pt rated pain 7-9/10, managed with PRNs. Pt refused oxycodone when offered to her.   Neuro/CMS: Alert and Oriented x 4.   Dressings/Drains: None  IV: Left chest port.  Additional info:Pt is on a regular diet.  Plan:Continue POC.

## 2024-04-28 NOTE — PROGRESS NOTES
[facility]    Medicine Progress Note - Hospitalist Service, GOLD TEAM 16    Date of Admission:  4/23/2024    Assessment & Plan   A: Patient is a 25 y/o woman who has a past medical history significant for sickle cell disease, history of acute chest syndrome, iron overload secondary to frequent transfusions, history of CVA, moderate persistent asthma, depression and history of recurrent VTE/PE. Patient initially presented on 22-Apr-2024, after being sent from infusion clinic, with dyspnea and hypoxemia. Patient could not receive IV contrast due to allergy and underwent V/Q scan which was negative for PE. Patient was discharged to home on 22-Apr-2023. Patient presented to infusion clinic on 23-Apr-2024 as an add-on for IV fluids and pain medications. Patient was again found to be hypoxemic on room air. Patient received 1 unit of pRBCs without improvement in symptoms. As patient had persistent hypoxemia, patient was sent to the ED. Patient was admitted to the hospital on 23-Apr-2024 for acute hypoxemic respiratory failure.     Cause of acute hypoxemic respiratory failure is not fully clear at this point. CT chest on 23-Apr-2023 only showed mild dependent atelectasis bilaterally and showed no other acute abnormality. Covid-19, influenza and RSV tests were negative. Patient having a cough raises asthma exacerbation as a possibility.      P:  1.) Acute hypoxemic respiratory failure, resolving:  - Attempting to titrate down to room air.     2.) Sickle cell disease:  - Patient on IV hydromorphone and oxycodone as needed for pain. After discussion, reducing frequency of IV hydromorphone. Encouraged patient to use oxycodone instead of IV hydromorphone.   - Hematology seeing.  - Stopped IV fluids for now.     3.) Moderate persistent asthma with possible exacerbation, improving:  - Patient to continue prednisone 40 mg daily for now. Patient on day 3 of prednisone. Monitoring  response.  - Patient usually on symbicort which is being continued. Duonebs as needed.     4.) History of CVA:  - Patient on aspirin 81 mg twice daily.     5.) Personal history of VTE and PE:  - Pneumatic compression device ordered.             Diet: Combination Diet Regular Diet Adult    Lopez Catheter: Not present  Lines: PRESENT      Port A Cath Single 04/21/21 Left Chest wall-Site Assessment: WDL      Cardiac Monitoring: None  Code Status: Full Code      Clinically Significant Risk Factors                             # Financial/Environmental Concerns: none  # Asthma: noted on problem list               Chirag Johnson MD  Hospitalist Service, GOLD TEAM 16  [facility]  Securely message with [employer] (more info)  Text page via [employer] Paging/Directory   See signed in provider for up to date coverage information  ______________________________________________________________________    Interval History   Patient noted cough improved. Patient noted having oxygen saturation in the low 90s when she takes off her oxygen.    Patient noted pain that is unchanged from her usual sickle cell pain.    Physical Exam   Vital Signs: Temp: 98.4  F (36.9  C) Temp src: Oral BP: 120/76 Pulse: 83   Resp: 17 SpO2: 99 % O2 Device: Oxymask Oxygen Delivery: 3 LPM   Oxygen saturation 92-98% on 1.5 litres per minute of oxygen.    General: Patient comfortable, NAD.  Heart: RRR, S1 S2 w/o murmurs.  Lungs: Breath sounds present. No crackles/wheezes heard.    Labs noted.  Sodium 137; Potassium 3.9; Creatinine 0.40;  WBC 8.2; Hb 7.7; Platelets 396;      Medical Decision Making             Data

## 2024-04-28 NOTE — PLAN OF CARE
"Goal Outcome Evaluation:       24 year old female patient that was admitted for hypoxemia secdondary to sickle cell disease. Patient's vitals have been stable, on 3-1L O2 with the oxy-mask \"goal is 94% and above\". Provider requested that we titrate down the O2 to see how the patient dose. Currently on 1L and SPO2 is holding. Alert x4, last BM was on 4/26, up to commode at bedside. Regular diet, stand by assist, no skin issues. Left port \"chest\", full code, contact precaution, allergie to contrast. History of VTE, PE, asthma. Patient is RN managed for Mag, and K+.                  "

## 2024-04-29 ENCOUNTER — TELEPHONE (OUTPATIENT)
Dept: GASTROENTEROLOGY | Facility: CLINIC | Age: 24
End: 2024-04-29
Payer: COMMERCIAL

## 2024-04-29 VITALS
SYSTOLIC BLOOD PRESSURE: 116 MMHG | DIASTOLIC BLOOD PRESSURE: 71 MMHG | RESPIRATION RATE: 17 BRPM | OXYGEN SATURATION: 92 % | HEART RATE: 101 BPM | TEMPERATURE: 98.3 F

## 2024-04-29 DIAGNOSIS — D57.00 SICKLE CELL PAIN CRISIS (H): ICD-10-CM

## 2024-04-29 LAB
CREAT SERPL-MCNC: 0.47 MG/DL (ref 0.51–0.95)
EGFRCR SERPLBLD CKD-EPI 2021: >90 ML/MIN/1.73M2
MAGNESIUM SERPL-MCNC: 1.7 MG/DL (ref 1.7–2.3)
PLATELET # BLD AUTO: 441 10E3/UL (ref 150–450)
POTASSIUM SERPL-SCNC: 3.4 MMOL/L (ref 3.4–5.3)

## 2024-04-29 PROCEDURE — 36591 DRAW BLOOD OFF VENOUS DEVICE: CPT | Performed by: INTERNAL MEDICINE

## 2024-04-29 PROCEDURE — 99233 SBSQ HOSP IP/OBS HIGH 50: CPT | Performed by: PEDIATRICS

## 2024-04-29 PROCEDURE — 250N000013 HC RX MED GY IP 250 OP 250 PS 637: Performed by: INTERNAL MEDICINE

## 2024-04-29 PROCEDURE — 85049 AUTOMATED PLATELET COUNT: CPT | Performed by: INTERNAL MEDICINE

## 2024-04-29 PROCEDURE — 82565 ASSAY OF CREATININE: CPT | Performed by: INTERNAL MEDICINE

## 2024-04-29 PROCEDURE — 83735 ASSAY OF MAGNESIUM: CPT | Performed by: INTERNAL MEDICINE

## 2024-04-29 PROCEDURE — 250N000011 HC RX IP 250 OP 636: Performed by: INTERNAL MEDICINE

## 2024-04-29 PROCEDURE — 84132 ASSAY OF SERUM POTASSIUM: CPT | Performed by: INTERNAL MEDICINE

## 2024-04-29 PROCEDURE — 250N000012 HC RX MED GY IP 250 OP 636 PS 637: Performed by: INTERNAL MEDICINE

## 2024-04-29 RX ORDER — HEPARIN SODIUM (PORCINE) LOCK FLUSH IV SOLN 100 UNIT/ML 100 UNIT/ML
5-10 SOLUTION INTRAVENOUS
Status: DISCONTINUED | OUTPATIENT
Start: 2024-04-29 | End: 2024-04-29 | Stop reason: HOSPADM

## 2024-04-29 RX ORDER — ACETAMINOPHEN 325 MG/1
975 TABLET ORAL EVERY 8 HOURS
Qty: 270 TABLET | Refills: 0 | Status: SHIPPED | OUTPATIENT
Start: 2024-04-29 | End: 2024-10-04

## 2024-04-29 RX ORDER — OXYCODONE HYDROCHLORIDE 10 MG/1
10 TABLET ORAL EVERY 4 HOURS PRN
Qty: 20 TABLET | Refills: 0 | Status: SHIPPED | OUTPATIENT
Start: 2024-04-29 | End: 2024-05-06

## 2024-04-29 RX ORDER — HEPARIN SODIUM,PORCINE 10 UNIT/ML
5-10 VIAL (ML) INTRAVENOUS EVERY 24 HOURS
Status: DISCONTINUED | OUTPATIENT
Start: 2024-04-29 | End: 2024-04-29 | Stop reason: HOSPADM

## 2024-04-29 RX ORDER — PREDNISONE 20 MG/1
40 TABLET ORAL DAILY
Qty: 6 TABLET | Refills: 0 | Status: SHIPPED | OUTPATIENT
Start: 2024-04-30 | End: 2024-05-03

## 2024-04-29 RX ORDER — METHOCARBAMOL 750 MG/1
750 TABLET, FILM COATED ORAL 4 TIMES DAILY PRN
Status: DISCONTINUED | OUTPATIENT
Start: 2024-04-29 | End: 2024-04-29 | Stop reason: HOSPADM

## 2024-04-29 RX ORDER — HEPARIN SODIUM,PORCINE 10 UNIT/ML
5-10 VIAL (ML) INTRAVENOUS
Status: DISCONTINUED | OUTPATIENT
Start: 2024-04-29 | End: 2024-04-29 | Stop reason: HOSPADM

## 2024-04-29 RX ADMIN — OXYCODONE HYDROCHLORIDE 10 MG: 10 TABLET ORAL at 07:50

## 2024-04-29 RX ADMIN — HYDROMORPHONE HYDROCHLORIDE 1 MG: 1 INJECTION, SOLUTION INTRAMUSCULAR; INTRAVENOUS; SUBCUTANEOUS at 12:40

## 2024-04-29 RX ADMIN — BUDESONIDE AND FORMOTEROL FUMARATE DIHYDRATE 2 PUFF: 160; 4.5 AEROSOL RESPIRATORY (INHALATION) at 07:49

## 2024-04-29 RX ADMIN — HYDROMORPHONE HYDROCHLORIDE 1 MG: 1 INJECTION, SOLUTION INTRAMUSCULAR; INTRAVENOUS; SUBCUTANEOUS at 02:48

## 2024-04-29 RX ADMIN — OXYCODONE HYDROCHLORIDE 10 MG: 10 TABLET ORAL at 11:58

## 2024-04-29 RX ADMIN — HYDROMORPHONE HYDROCHLORIDE 1 MG: 1 INJECTION, SOLUTION INTRAMUSCULAR; INTRAVENOUS; SUBCUTANEOUS at 06:40

## 2024-04-29 RX ADMIN — HEPARIN, PORCINE (PF) 10 UNIT/ML INTRAVENOUS SYRINGE 5 ML: at 16:30

## 2024-04-29 RX ADMIN — PREDNISONE 40 MG: 20 TABLET ORAL at 07:50

## 2024-04-29 RX ADMIN — OXYCODONE HYDROCHLORIDE 10 MG: 10 TABLET ORAL at 00:45

## 2024-04-29 RX ADMIN — OMEPRAZOLE 20 MG: 20 CAPSULE, DELAYED RELEASE ORAL at 07:50

## 2024-04-29 RX ADMIN — ACETAMINOPHEN 975 MG: 325 TABLET, FILM COATED ORAL at 06:40

## 2024-04-29 RX ADMIN — METHOCARBAMOL 750 MG: 750 TABLET ORAL at 12:49

## 2024-04-29 RX ADMIN — HYDROMORPHONE HYDROCHLORIDE 1 MG: 1 INJECTION, SOLUTION INTRAMUSCULAR; INTRAVENOUS; SUBCUTANEOUS at 09:41

## 2024-04-29 RX ADMIN — ASPIRIN 81 MG CHEWABLE TABLET 81 MG: 81 TABLET CHEWABLE at 07:50

## 2024-04-29 RX ADMIN — OXYCODONE HYDROCHLORIDE 10 MG: 10 TABLET ORAL at 16:28

## 2024-04-29 RX ADMIN — ACETAMINOPHEN 975 MG: 325 TABLET, FILM COATED ORAL at 14:24

## 2024-04-29 RX ADMIN — HYDROMORPHONE HYDROCHLORIDE 1 MG: 1 INJECTION, SOLUTION INTRAMUSCULAR; INTRAVENOUS; SUBCUTANEOUS at 15:44

## 2024-04-29 ASSESSMENT — ACTIVITIES OF DAILY LIVING (ADL)
ADLS_ACUITY_SCORE: 40
ADLS_ACUITY_SCORE: 42
ADLS_ACUITY_SCORE: 40
ADLS_ACUITY_SCORE: 42
ADLS_ACUITY_SCORE: 40
ADLS_ACUITY_SCORE: 42
ADLS_ACUITY_SCORE: 42
ADLS_ACUITY_SCORE: 40
ADLS_ACUITY_SCORE: 40
ADLS_ACUITY_SCORE: 42
ADLS_ACUITY_SCORE: 40

## 2024-04-29 NOTE — PROGRESS NOTES
[facility]    Medicine Progress Note - Hospitalist Service, GOLD TEAM 16    Date of Admission:  4/23/2024    Assessment & Plan   A: Patient is a 23 y/o woman who has a past medical history significant for sickle cell disease, history of acute chest syndrome, iron overload secondary to frequent transfusions, history of CVA, moderate persistent asthma, depression and history of recurrent VTE/PE. Patient initially presented on 22-Apr-2024, after being sent from infusion clinic, with dyspnea and hypoxemia. Patient could not receive IV contrast due to allergy and underwent V/Q scan which was negative for PE. Patient was discharged to home on 22-Apr-2023. Patient presented to infusion clinic on 23-Apr-2024 as an add-on for IV fluids and pain medications. Patient was again found to be hypoxemic on room air. Patient received 1 unit of pRBCs without improvement in symptoms. As patient had persistent hypoxemia, patient was sent to the ED. Patient was admitted to the hospital on 23-Apr-2024 for acute hypoxemic respiratory failure.     Cause of acute hypoxemic respiratory failure is not fully clear at this point. CT chest on 23-Apr-2023 only showed mild dependent atelectasis bilaterally and showed no other acute abnormality. Covid-19, influenza and RSV tests were negative. Patient having a cough raises asthma exacerbation as a possibility.      P:  1.) Acute hypoxemic respiratory failure, resolving:  - Patient now on room air.     2.) Sickle cell disease:  - Patient on IV hydromorphone and oxycodone as needed for pain. Encouraged patient to use oxycodone instead of IV hydromorphone.   - Patient to be on oxycodone as outpatient.  - Hematology seeing.  - Stopped IV fluids for now.     3.) Moderate persistent asthma with possible exacerbation, improving:  - Patient to continue prednisone 40 mg daily for now. Patient on day 4 of prednisone. Planning a 7 day course of prednisone.  -  Patient usually on symbicort which is being continued. Duonebs as needed.     4.) History of CVA:  - Patient on aspirin 81 mg twice daily.     5.) Personal history of VTE and PE:  - Pneumatic compression device ordered.             Diet: Combination Diet Regular Diet Adult    Lopez Catheter: Not present  Lines: PRESENT             Cardiac Monitoring: None  Code Status: Full Code      Clinically Significant Risk Factors                             # Financial/Environmental Concerns: none  # Asthma: noted on problem list            Chirag Johnson MD  Hospitalist Service, GOLD TEAM 16  [facility]  Securely message with [employer] (more info)  Text page via [employer] Paging/Directory   See signed in provider for up to date coverage information  ______________________________________________________________________    Interval History   Patient noted no cough and no dyspnea today. Patient noted still having pain in back. Patient stated that this was similar to chronic pain from sickle cell disease but was somewhat more intense. Patient noted only transient improvement with muscle relaxants in the past.     Physical Exam   Vital Signs: Temp: 98.3  F (36.8  C) Temp src: Oral BP: 104/52 Pulse: 101   Resp: 17 SpO2: 97 % O2 Device: None (Room air)      General: Patient comfortable, NAD.  Heart: RRR, S1 S2 w/o murmurs.  Lungs: Breath sounds present. No crackles/wheezes heard.  Back: No discomfort with palpation.     Medical Decision Making

## 2024-04-29 NOTE — PLAN OF CARE
Goal Outcome Evaluation:         Alert and oriented X4.    Vitally stable. /52 (BP Location: Right arm)   Pulse 91   Temp 98  F (36.7  C) (Oral)   Resp 17   LMP  (LMP Unknown)   SpO2 92%       Pain managed with iV dilaudid, oxycodone, tylenol.

## 2024-04-29 NOTE — TELEPHONE ENCOUNTER
Narcotic Refill Request    Medication(s) requested:  Oxycodone 10 mg   Person Requesting Refill: Patient  What pain is the medication treating: Sickle cell pain  How is the medication being taken?:every 6 hrs  Does pt have enough for today? No  Is pain being adequately controlled on the current regimen?: Yes  Experiencing any side effects from medication?: No    Date of most recent appointment:  04/08/24 aida/Patricia Mantilla  Any No Show Visits: No  Next appointment:   06/14/24 w/Patricia Mantilla  Last fill date and by whom:  04/19/24 by Patricia Mantilla   Reviewed: no access    Routed/Paged provider: Patricia Mantilla

## 2024-04-29 NOTE — TELEPHONE ENCOUNTER
Caller: No call    Reason for Reschedule/Cancellation   (please be detailed, any staff messages or encounters to note?): Needs hospital due to low Hgb.       Prior to reschedule please review:  Ordering Provider: MALAIKA SILVERIO Determined: MAC  Does patient have any ASC Exclusions, please identify?: Y - low Hgb      Notes on Cancelled Procedure:  Procedure: Upper Endoscopy [EGD]   Date: 5/9/2024  Location: [facility] [place]; [address] [place] [address], 5th Floor, [place] [id number]  Surgeon: John      Rescheduled: No, patient is currently admitted. Writed responded to staff message and included oncology team to see if they want it completed while she is admitted.        Did you cancel or rescheduled an EUS procedure? No.

## 2024-04-29 NOTE — PLAN OF CARE
Goal Outcome Evaluation:      Patient alert and oriented x4  Room air  VSS  Pain managed with Tylenol, Oxy and IV dilaudid.   Good appetite. Bowel sounds audible. BM 4/28.  Independent in room. Voids spontaneously without difficulties.   Call light within reach, able to make needs known.   Pt. discharged at 1713 to home with friend.  Pt. was accompanied by NST, and left with personal belongings.  Prior to discharge, PIV was removed.  Pt. received complete discharge paperwork and  medications as filled by discharge pharmacy.  Pt. was given times of last dose for all discharge medications in writing on discharge medication sheets.  Discharge teaching included  medication, pain management, activity restrictions, dressing changes, and signs and symptoms of infection.  Pt. to follow up with MR of abdomen on May 3rd.Pt. had no further questions at the time of discharge and no unmet needs were identified.

## 2024-04-29 NOTE — TELEPHONE ENCOUNTER
----- Message from Jesica Aguiar RN sent at 4/27/2024  3:29 PM CDT -----  Regarding: r/s to hospital setting  Patient is scheduled for a Upper endoscopy (EGD)  Date of procedure: 5.9.2024  Indication: Epigastric pain, nausea, vomiting  Sedation type: MAC    Reason for r/s: Hgb is less than 9.    FYI patient is currently admitted in the hospital.       Thank you,   Jesica Aguiar RN  Endoscopy Procedure Pre Assessment RN  [phone] option 4

## 2024-04-30 ENCOUNTER — NURSE TRIAGE (OUTPATIENT)
Dept: ONCOLOGY | Facility: CLINIC | Age: 24
End: 2024-04-30
Payer: COMMERCIAL

## 2024-04-30 ENCOUNTER — PATIENT OUTREACH (OUTPATIENT)
Dept: CARE COORDINATION | Facility: CLINIC | Age: 24
End: 2024-04-30
Payer: COMMERCIAL

## 2024-04-30 ENCOUNTER — INFUSION THERAPY VISIT (OUTPATIENT)
Dept: ONCOLOGY | Facility: CLINIC | Age: 24
End: 2024-04-30
Attending: PEDIATRICS
Payer: COMMERCIAL

## 2024-04-30 VITALS
OXYGEN SATURATION: 97 % | BODY MASS INDEX: 25.51 KG/M2 | HEART RATE: 96 BPM | WEIGHT: 148.59 LBS | DIASTOLIC BLOOD PRESSURE: 78 MMHG | SYSTOLIC BLOOD PRESSURE: 124 MMHG | TEMPERATURE: 98.9 F

## 2024-04-30 DIAGNOSIS — D57.00 SICKLE CELL PAIN CRISIS (H): Primary | ICD-10-CM

## 2024-04-30 DIAGNOSIS — G81.10 SPASTIC HEMIPLEGIA, UNSPECIFIED ETIOLOGY, UNSPECIFIED LATERALITY (H): ICD-10-CM

## 2024-04-30 PROCEDURE — 250N000011 HC RX IP 250 OP 636: Performed by: PEDIATRICS

## 2024-04-30 PROCEDURE — 250N000013 HC RX MED GY IP 250 OP 250 PS 637: Performed by: PEDIATRICS

## 2024-04-30 PROCEDURE — 258N000003 HC RX IP 258 OP 636: Performed by: PEDIATRICS

## 2024-04-30 PROCEDURE — 96375 TX/PRO/DX INJ NEW DRUG ADDON: CPT

## 2024-04-30 PROCEDURE — 96376 TX/PRO/DX INJ SAME DRUG ADON: CPT

## 2024-04-30 PROCEDURE — 96361 HYDRATE IV INFUSION ADD-ON: CPT

## 2024-04-30 PROCEDURE — 96374 THER/PROPH/DIAG INJ IV PUSH: CPT

## 2024-04-30 RX ORDER — DIPHENHYDRAMINE HCL 25 MG
50 CAPSULE ORAL
Status: CANCELLED
Start: 2024-07-01

## 2024-04-30 RX ORDER — HEPARIN SODIUM,PORCINE 10 UNIT/ML
5 VIAL (ML) INTRAVENOUS
Status: CANCELLED | OUTPATIENT
Start: 2024-07-01

## 2024-04-30 RX ORDER — ONDANSETRON 2 MG/ML
8 INJECTION INTRAMUSCULAR; INTRAVENOUS EVERY 6 HOURS PRN
Status: CANCELLED
Start: 2024-07-01

## 2024-04-30 RX ORDER — DIPHENHYDRAMINE HCL 25 MG
50 CAPSULE ORAL
Status: COMPLETED | OUTPATIENT
Start: 2024-04-30 | End: 2024-04-30

## 2024-04-30 RX ORDER — HEPARIN SODIUM (PORCINE) LOCK FLUSH IV SOLN 100 UNIT/ML 100 UNIT/ML
5 SOLUTION INTRAVENOUS
Status: DISCONTINUED | OUTPATIENT
Start: 2024-04-30 | End: 2024-04-30 | Stop reason: HOSPADM

## 2024-04-30 RX ORDER — HEPARIN SODIUM (PORCINE) LOCK FLUSH IV SOLN 100 UNIT/ML 100 UNIT/ML
5 SOLUTION INTRAVENOUS
Status: CANCELLED | OUTPATIENT
Start: 2024-07-01

## 2024-04-30 RX ORDER — KETOROLAC TROMETHAMINE 30 MG/ML
30 INJECTION, SOLUTION INTRAMUSCULAR; INTRAVENOUS ONCE
Status: CANCELLED
Start: 2024-07-01 | End: 2024-07-01

## 2024-04-30 RX ORDER — KETOROLAC TROMETHAMINE 30 MG/ML
30 INJECTION, SOLUTION INTRAMUSCULAR; INTRAVENOUS ONCE
Status: COMPLETED | OUTPATIENT
Start: 2024-04-30 | End: 2024-04-30

## 2024-04-30 RX ORDER — ONDANSETRON 2 MG/ML
8 INJECTION INTRAMUSCULAR; INTRAVENOUS EVERY 6 HOURS PRN
Status: DISCONTINUED | OUTPATIENT
Start: 2024-04-30 | End: 2024-04-30 | Stop reason: HOSPADM

## 2024-04-30 RX ORDER — ONDANSETRON 4 MG/1
8 TABLET, FILM COATED ORAL
Status: CANCELLED
Start: 2024-07-01

## 2024-04-30 RX ADMIN — HYDROMORPHONE HYDROCHLORIDE 1 MG: 1 INJECTION, SOLUTION INTRAMUSCULAR; INTRAVENOUS; SUBCUTANEOUS at 08:58

## 2024-04-30 RX ADMIN — Medication 5 ML: at 11:55

## 2024-04-30 RX ADMIN — DIPHENHYDRAMINE HYDROCHLORIDE 50 MG: 25 CAPSULE ORAL at 08:55

## 2024-04-30 RX ADMIN — HYDROMORPHONE HYDROCHLORIDE 1 MG: 1 INJECTION, SOLUTION INTRAMUSCULAR; INTRAVENOUS; SUBCUTANEOUS at 10:02

## 2024-04-30 RX ADMIN — KETOROLAC TROMETHAMINE 30 MG: 30 INJECTION, SOLUTION INTRAMUSCULAR; INTRAVENOUS at 08:57

## 2024-04-30 RX ADMIN — ONDANSETRON 8 MG: 2 INJECTION INTRAMUSCULAR; INTRAVENOUS at 08:55

## 2024-04-30 RX ADMIN — SODIUM CHLORIDE, POTASSIUM CHLORIDE, SODIUM LACTATE AND CALCIUM CHLORIDE 1000 ML: 600; 310; 30; 20 INJECTION, SOLUTION INTRAVENOUS at 08:55

## 2024-04-30 RX ADMIN — HYDROMORPHONE HYDROCHLORIDE 1 MG: 1 INJECTION, SOLUTION INTRAMUSCULAR; INTRAVENOUS; SUBCUTANEOUS at 11:00

## 2024-04-30 NOTE — PROGRESS NOTES
Social Work - Transportation  [facility]    Data/Intervention:  Patient Name: Jennifer Cervantes Goes By: Jennifer    /Age: 2000 (24 year old)    Referral From: [facility]  Reason for Referral:  support requested for patient transportation needs for today's appointments.  Assessment:  called [employer] to arrange ride through patient's insurance. [employer] arranged  for patient from home with [employer]. Patient will need to call [phone] when ready for return ride home.  Plan: Patient is aware of the transportation plan.  available to assist with any other needs.    CARLOS Chavez,LGUDAY  Hematology/Oncology Social Worker  Phone:[phone] Pager: [phone]

## 2024-04-30 NOTE — TELEPHONE ENCOUNTER
Oncology Nurse Triage - Sickle Cell Pain Crisis:    Situation: Jennifer  calling about Sickle Cell Pain Crisis, requesting to be added on for IV fluids and pain medicine    Background:     Patient's last infusion was 4/23/2024   Last clinic visit date:4/8/2024 Patricia Mantilla   Does patient have active treatment plan?  Yes      Assessment of Symptoms:  Onset/Duration of symptoms: 1 day    Is it typical sickle cell pain? Yes   Location: generalized  Character: Sharp           Intensity: 8/10    Any radiation of pain, numbness, tingling, weakness, warmth, swelling, discoloration of arms or legs?No     Fever?No     Chest Pain Present: No     Shortness of breath: No     Other home therapies tried: HEAT/HEATING PAD and WARM BATH     Last home medication taken and when: Oxycodone yesterday after discharge from hospital    Any Refills Needed?: No     Does patient have transportation & length of time to get to clinic: No   But try calling if last minute appt opens up.       Recommendations:   Meets protocol    0722 Appt available for [employer] at 8:00am for IVF/Pain if pt is able to get a ride to clinic.     0726 CCOD and SW request sent to assist with scheduling and ride home needs.     Please note, if you are late for your appt, you risk losing your infusion appt as it may delay another patient's infusion who arrived on time.

## 2024-04-30 NOTE — PROGRESS NOTES
Hematology Progress Note  04/29/2024      Assessment:  Jennifer Cervantes is a 24-year-old woman with sickle cell anemia (Hb SS), prior CVA with residual hemiparesis and cognitive delay, asthma, recurrent VTE's, and chronic pain who has been admitted for several days with hypoxemia and pain crisis. She has been able to step down to oral analgesics today and is ready to go home. I am in agreement with that plan. She should go home on her PTA oxycodone dose. She has regular routine follow up already.        Eric Duncan MD  Classical Hematologist  Division of Hematology, Oncology, and Transplantation  [facility]  [facility]  Pager: [phone]    50 minutes spent by me on the date of the encounter doing chart review, history and exam, documentation and further activities per the note    ---------------  Interval Events  She has maintained better O2 sats in the last 24 hours and is sitting up more. She denies cough or significant dyspnea. She is on room air and her pain is improving. 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 She is feeling good about her overall trajectory despite needing to come to the ED more these last few months, and she has been able to stay away from admissions.      /71 (BP Location: Right arm)   Pulse 101   Temp 98.3  F (36.8  C) (Oral)   Resp 17   LMP  (LMP Unknown)   SpO2 92%   Gen: sitting up in bed, conversant and comfortable  HEENT: no icterus, MMM  RESP: breathing comfortably on room air, O2 sat 90-94%  SKIN: no rashes, no ecchymoses  NEURO: cognitively intact and speaking clearly. Chronic contractures of right extremities evident and unchanged.    No current facility-administered medications for this  encounter.    Current Outpatient Medications:     acetaminophen (TYLENOL) 325 MG tablet, Take 3 tablets (975 mg) by mouth every 8 hours, Disp: 270 tablet, Rfl: 0    [START ON 4/30/2024] predniSONE (DELTASONE) 20 MG tablet, Take 2 tablets (40 mg) by mouth daily for 3 days, Disp: 6 tablet, Rfl: 0    albuterol (PROAIR HFA/PROVENTIL HFA/VENTOLIN HFA) 108 (90 Base) MCG/ACT inhaler, Inhale 2 puffs into the lungs every 6 hours as needed for shortness of breath or wheezing, Disp: 8.5 g, Rfl: 3    albuterol (PROVENTIL) (2.5 MG/3ML) 0.083% neb solution, Take 2 vials (5 mg) by nebulization every 6 hours as needed for shortness of breath or wheezing, Disp: 90 mL, Rfl: 3    aspirin (ASA) 81 MG chewable tablet, Take 1 tablet (81 mg) by mouth 2 times daily, Disp: 60 tablet, Rfl: 11    budesonide-formoterol (SYMBICORT) 160-4.5 MCG/ACT Inhaler, Inhale 2 puffs twice daily plus 1-2 puffs as needed. May use up to 12 puffs per day., Disp: 20.4 g, Rfl: 11    cetirizine (ZYRTEC) 10 MG tablet, Take 1 tablet (10 mg) by mouth daily, Disp: 30 tablet, Rfl: 1    deferasirox (JADENU) 360 MG tablet, Take 4 tablets (1,440 mg) by mouth every evening, Disp: 120 tablet, Rfl: 4    diphenhydrAMINE (BENADRYL) 25 MG capsule, Take 1 capsule (25 mg) by mouth every 6 hours as needed for itching or allergies, Disp: 30 capsule, Rfl: 0    EPINEPHrine (ANY BX GENERIC EQUIV) 0.3 MG/0.3ML injection 2-pack, Inject 0.3 mLs (0.3 mg) into the muscle as needed for anaphylaxis, Disp: 1 each, Rfl: 1    Hydroxyurea 1000 MG TABS, Take 3,000 mg by mouth daily, Disp: 90 tablet, Rfl: 3    ibuprofen (ADVIL/MOTRIN) 600 MG tablet, Take 600 mg by mouth every 6 hours as needed for moderate pain Using rarely, 2x/week at most, Disp: , Rfl:     melatonin 5 MG tablet, Take 1 tablet (5 mg) by mouth nightly as needed for sleep, Disp: 30 tablet, Rfl: 1    methocarbamol (ROBAXIN) 750 MG tablet, Take 1 tablet (750 mg) by mouth 4 times daily as needed for muscle spasms (during sickle pain  crises. Okay to take scheduled while in pain), Disp: 60 tablet, Rfl: 1    naloxone (NARCAN) 4 MG/0.1ML nasal spray, Spray 1 spray (4 mg) into one nostril alternating nostrils as needed for opioid reversal every 2-3 minutes until assistance arrives, Disp: 0.2 mL, Rfl: 0    naloxone (NARCAN) 4 MG/0.1ML nasal spray, Spray 4 mg into one nostril alternating nostrils as needed for opioid reversal every 2-3 minutes until assistance arrives, Disp: , Rfl:     omeprazole (PRILOSEC) 20 MG DR capsule, Take 1 capsule (20 mg) by mouth daily, Disp: 30 capsule, Rfl: 0    ondansetron (ZOFRAN) 8 MG tablet, Take 1 tablet (8 mg) by mouth every 8 hours as needed for nausea, Disp: 30 tablet, Rfl: 1    oxyCODONE IR (ROXICODONE) 10 MG tablet, Take 1 tablet (10 mg) by mouth every 4 hours as needed for severe pain or breakthrough pain Goal 4 per day. Max 6 per day., Disp: 20 tablet, Rfl: 0    Recent Results (from the past 24 hour(s))   Platelet count    Collection Time: 04/29/24 11:40 AM   Result Value Ref Range    Platelet Count 441 150 - 450 10e3/uL   Creatinine    Collection Time: 04/29/24 11:40 AM   Result Value Ref Range    Creatinine 0.47 (L) 0.51 - 0.95 mg/dL    GFR Estimate >90 >60 mL/min/1.73m2   Potassium    Collection Time: 04/29/24 11:40 AM   Result Value Ref Range    Potassium 3.4 3.4 - 5.3 mmol/L   Magnesium    Collection Time: 04/29/24 11:40 AM   Result Value Ref Range    Magnesium 1.7 1.7 - 2.3 mg/dL

## 2024-04-30 NOTE — DISCHARGE SUMMARY
[facility]  Hospitalist Discharge Summary      Date of Admission:  23-Apr-2024  Date of Discharge:   29-Apr-2024  Discharging Provider: Chirag Johnson MD  Discharge Service: Hospitalist Service, GOLD TEAM 16    Discharge Diagnoses   1.) Acute hypoxemic respiratory failure   2.) Sickle cell disease   3.) Moderate persistent asthma with possible exacerbation   4.) History of CVA   5.) Personal history of VTE and PE     Clinically Significant Risk Factors          Follow-ups Needed After Discharge   Follow-up Appointments     Adult [facility] Follow-up and recommended labs and tests      Follow up with PCP in 3 days.    Appointments on [place] and/or [facility] (with [facility] or [facility]   provider or service). Call [phone] if you haven't heard regarding   these appointments within 7 days of discharge.            Unresulted Labs Ordered in the Past 30 Days of this Admission       No orders found from 3/24/2024 to 4/24/2024.            Discharge Disposition   - Patient was discharged to home in good condition    Hospital Course   Patient is a 25 y/o woman who has a past medical history significant for sickle cell disease, history of acute chest syndrome, iron overload secondary to frequent transfusions, history of CVA, moderate persistent asthma, depression and history of recurrent VTE/PE. Patient initially presented on 22-Apr-2024, after being sent from infusion clinic, with dyspnea and hypoxemia. Patient could not receive IV contrast due to allergy and underwent V/Q scan which was negative for PE. Patient was discharged to home on 22-Apr-2023. Patient presented to infusion clinic on 23-Apr-2024 as an add-on for IV fluids and pain medications. Patient was again found to be hypoxemic on room air. Patient received 1 unit of pRBCs without improvement in symptoms. As patient had persistent hypoxemia, patient was sent to the ED. Patient was admitted to the hospital on  23-Apr-2024 for acute hypoxemic respiratory failure.     Cause of acute hypoxemic respiratory failure is not fully clear at this point. CT chest on 23-Apr-2023 only showed mild dependent atelectasis bilaterally and showed no other acute abnormality. Covid-19, influenza and RSV tests were negative. Patient having a cough raised asthma exacerbation as a possibility.     1.) Acute hypoxemic respiratory failure:  - Respiratory status improved with treatment of asthma exacerbation.   - Patient was on room air by the time of discharge.      2.) Sickle cell disease:  - Patient was on IV hydromorphone and oxycodone as needed for pain during hospital stay. Patient's pain appeared to be chronic pain from sickle cell disease.  - Patient to be on oxycodone as outpatient.  - Hematology saw patient during hospital stay.     3.) Moderate persistent asthma with possible exacerbation, improving:  - Patient was started on prednisone 40 mg daily with improvement in symptoms. Patient to complete a 7 day course of prednisone as outpatient.  - Patient to continue symbicort inhaler as outpatient.     4.) History of CVA:  - Patient on aspirin 81 mg twice daily.     5.) Personal history of VTE and PE:  - Pneumatic compression device ordered during hospital stay.       Consultations This Hospital Stay   HEMATOLOGY ADULT IP CONSULT  CARE MANAGEMENT / SOCIAL WORK IP CONSULT    Code Status   Full Code    Time Spent on this Encounter   - Time spent discharging patient was 35 minutes.        Chirag Johnson MD  [facility] MED SURG  [address] [place] [id number]  Phone: [phone]  Fax: [phone]  ______________________________________________________________________    Physical Exam   Vital Signs: Temp: 98.3  F (36.8  C) Temp src: Oral BP: 116/71 Pulse: 101   Resp: 17 SpO2: 92 % O2 Device: None (Room air)      General: Patient comfortable, NAD.  Heart: RRR, S1 S2 w/o murmurs.  Lungs: Breath sounds present. No  crackles/wheezes heard.  Back: No discomfort with palpation.        Primary Care Physician   Suraj Case    Discharge Orders      Reason for your hospital stay    Acute hypoxemic respiratory failure     Activity    Your activity upon discharge: As tolerated     Adult [facility] Follow-up and recommended labs and tests    Follow up with PCP in 3 days.    Appointments on [place] and/or [facility] (with [facility] or [facility] provider or service). Call [phone] if you haven't heard regarding these appointments within 7 days of discharge.     Discharge Instructions    Call PCP or seek medical attention with worsening dyspnea, significant new cough, discomfort with breathing or fever.     Diet    Follow this diet upon discharge: Regular diet.       Significant Results and Procedures   - None    Discharge Medications   Discharge Medication List as of 4/29/2024  6:24 PM        START taking these medications    Details   predniSONE (DELTASONE) 20 MG tablet Take 2 tablets (40 mg) by mouth daily for 3 days, Disp-6 tablet, R-0, E-Prescribe           CONTINUE these medications which have CHANGED    Details   acetaminophen (TYLENOL) 325 MG tablet Take 3 tablets (975 mg) by mouth every 8 hours, Disp-270 tablet, R-0, E-Prescribe           CONTINUE these medications which have NOT CHANGED    Details   albuterol (PROAIR HFA/PROVENTIL HFA/VENTOLIN HFA) 108 (90 Base) MCG/ACT inhaler Inhale 2 puffs into the lungs every 6 hours as needed for shortness of breath or wheezing, Disp-8.5 g, R-3, E-PrescribePharmacy may dispense brand covered by insurance (Proair, or proventil or ventolin or generic albuterol inhaler)      albuterol (PROVENTIL) (2.5 MG/3ML) 0.083% neb solution Take 2 vials (5 mg) by nebulization every 6 hours as needed for shortness of breath or wheezing, Disp-90 mL, R-3, E-Prescribe      aspirin (ASA) 81 MG chewable tablet Take 1 tablet (81 mg) by mouth 2 times daily, Disp-60 tablet, R-11, E-Prescribe       budesonide-formoterol (SYMBICORT) 160-4.5 MCG/ACT Inhaler Inhale 2 puffs twice daily plus 1-2 puffs as needed. May use up to 12 puffs per day., Disp-20.4 g, R-11, E-PrescribePlease dispense #2 10.2g inhalers for a 30-day supply per HONEY 2021 guidelines. If reject arises, enter DUR codes: HD / M0 / 1G. Note: MA  prefers brand Symbicort.      cetirizine (ZYRTEC) 10 MG tablet Take 1 tablet (10 mg) by mouth daily, Disp-30 tablet, R-1, E-Prescribe      deferasirox (JADENU) 360 MG tablet Take 4 tablets (1,440 mg) by mouth every evening, Disp-120 tablet, R-4, E-Prescribe      diphenhydrAMINE (BENADRYL) 25 MG capsule Take 1 capsule (25 mg) by mouth every 6 hours as needed for itching or allergies, Disp-30 capsule, R-0, E-Prescribe      EPINEPHrine (ANY BX GENERIC EQUIV) 0.3 MG/0.3ML injection 2-pack Inject 0.3 mLs (0.3 mg) into the muscle as needed for anaphylaxis, Disp-1 each, R-1, E-Prescribe      Hydroxyurea 1000 MG TABS Take 3,000 mg by mouth daily, Disp-90 tablet, R-3, E-Prescribe      ibuprofen (ADVIL/MOTRIN) 600 MG tablet Take 600 mg by mouth every 6 hours as needed for moderate pain Using rarely, 2x/week at most, Historical      melatonin 5 MG tablet Take 1 tablet (5 mg) by mouth nightly as needed for sleep, Disp-30 tablet, R-1, E-Prescribe      methocarbamol (ROBAXIN) 750 MG tablet Take 1 tablet (750 mg) by mouth 4 times daily as needed for muscle spasms (during sickle pain crises. Okay to take scheduled while in pain), Disp-60 tablet, R-1, E-Prescribe      !! naloxone (NARCAN) 4 MG/0.1ML nasal spray Spray 1 spray (4 mg) into one nostril alternating nostrils as needed for opioid reversal every 2-3 minutes until assistance arrives, Disp-0.2 mL, R-0, Historical      !! naloxone (NARCAN) 4 MG/0.1ML nasal spray Spray 4 mg into one nostril alternating nostrils as needed for opioid reversal every 2-3 minutes until assistance arrives, Historical      omeprazole (PRILOSEC) 20 MG DR capsule Take 1 capsule (20 mg) by mouth  daily, Disp-30 capsule, R-0, E-Prescribe      ondansetron (ZOFRAN) 8 MG tablet Take 1 tablet (8 mg) by mouth every 8 hours as needed for nausea, Disp-30 tablet, R-1, E-Prescribe      oxyCODONE IR (ROXICODONE) 10 MG tablet Take 1 tablet (10 mg) by mouth every 4 hours as needed for severe pain or breakthrough pain Goal 4 per day. Max 6 per day., Disp-20 tablet, R-0, E-Prescribe       !! - Potential duplicate medications found. Please discuss with provider.        STOP taking these medications       hydroxyurea (HYDREA) 500 MG capsule Comments:   Reason for Stopping:             Allergies   Allergies   Allergen Reactions    Contrast Dye      Hives and breathing issues    Fish-Derived Products Hives    Seafood Hives    Adhesive Tape Hives     Primipore dressing causes hives    Gadolinium     Iodinated Contrast Media

## 2024-04-30 NOTE — PROGRESS NOTES
Infusion Nursing Note:  Jennifer Cervantes presents today for IVF/DPain medication.    Patient seen by provider today: No   present during visit today: Not Applicable.    Note: Patient endorses generalized constant sharp pain with PS 8/10. State its her usual sickle cell crisis pain. Denies fever/chills. Denies chest and abdominal discomfort. No new concern.    Upon discharge, Patient had 3 doses of Dilaudid and Toradol with PS 5/10. Patient verbalized knowledge on where and when to call if symptoms persists/worsen.       Intravenous Access:  Implanted Port.    Treatment Conditions:  Not Applicable.      Post Infusion Assessment:  Patient tolerated infusion without incident.  Blood return noted pre and post infusion.  Site patent and intact, free from redness, edema or discomfort.  No evidence of extravasations.  Access discontinued per protocol.       Discharge Plan:   Patient declined prescription refills.  Discharge instructions reviewed with: Patient.  Patient and/or family verbalized understanding of discharge instructions and all questions answered.  AVS to patient via [employer].  Patient will return 5/17/24 for next appointment.   Patient discharged in stable condition accompanied by: self.  Departure Mode: Ambulatory.      GLORIA YANCEY RN

## 2024-05-01 ENCOUNTER — PATIENT OUTREACH (OUTPATIENT)
Dept: CARE COORDINATION | Facility: CLINIC | Age: 24
End: 2024-05-01
Payer: COMMERCIAL

## 2024-05-01 ENCOUNTER — INFUSION THERAPY VISIT (OUTPATIENT)
Dept: TRANSPLANT | Facility: CLINIC | Age: 24
End: 2024-05-01
Attending: PEDIATRICS
Payer: COMMERCIAL

## 2024-05-01 ENCOUNTER — NURSE TRIAGE (OUTPATIENT)
Dept: ONCOLOGY | Facility: CLINIC | Age: 24
End: 2024-05-01
Payer: COMMERCIAL

## 2024-05-01 VITALS
OXYGEN SATURATION: 95 % | RESPIRATION RATE: 16 BRPM | TEMPERATURE: 98.7 F | SYSTOLIC BLOOD PRESSURE: 131 MMHG | DIASTOLIC BLOOD PRESSURE: 5 MMHG | HEART RATE: 98 BPM

## 2024-05-01 DIAGNOSIS — D57.00 SICKLE CELL PAIN CRISIS (H): Primary | ICD-10-CM

## 2024-05-01 DIAGNOSIS — G81.10 SPASTIC HEMIPLEGIA, UNSPECIFIED ETIOLOGY, UNSPECIFIED LATERALITY (H): ICD-10-CM

## 2024-05-01 PROCEDURE — 258N000003 HC RX IP 258 OP 636: Performed by: PEDIATRICS

## 2024-05-01 PROCEDURE — 96361 HYDRATE IV INFUSION ADD-ON: CPT

## 2024-05-01 PROCEDURE — 250N000011 HC RX IP 250 OP 636: Performed by: PEDIATRICS

## 2024-05-01 PROCEDURE — 96376 TX/PRO/DX INJ SAME DRUG ADON: CPT

## 2024-05-01 PROCEDURE — 96375 TX/PRO/DX INJ NEW DRUG ADDON: CPT

## 2024-05-01 PROCEDURE — 250N000013 HC RX MED GY IP 250 OP 250 PS 637: Performed by: PEDIATRICS

## 2024-05-01 PROCEDURE — 96374 THER/PROPH/DIAG INJ IV PUSH: CPT

## 2024-05-01 RX ORDER — HEPARIN SODIUM (PORCINE) LOCK FLUSH IV SOLN 100 UNIT/ML 100 UNIT/ML
5 SOLUTION INTRAVENOUS
Status: CANCELLED | OUTPATIENT
Start: 2024-07-01

## 2024-05-01 RX ORDER — KETOROLAC TROMETHAMINE 30 MG/ML
30 INJECTION, SOLUTION INTRAMUSCULAR; INTRAVENOUS ONCE
Status: COMPLETED | OUTPATIENT
Start: 2024-05-01 | End: 2024-05-01

## 2024-05-01 RX ORDER — ONDANSETRON 2 MG/ML
8 INJECTION INTRAMUSCULAR; INTRAVENOUS EVERY 6 HOURS PRN
Status: DISCONTINUED | OUTPATIENT
Start: 2024-05-01 | End: 2024-05-01 | Stop reason: HOSPADM

## 2024-05-01 RX ORDER — HEPARIN SODIUM,PORCINE 10 UNIT/ML
5 VIAL (ML) INTRAVENOUS
Status: CANCELLED | OUTPATIENT
Start: 2024-07-01

## 2024-05-01 RX ORDER — KETOROLAC TROMETHAMINE 30 MG/ML
30 INJECTION, SOLUTION INTRAMUSCULAR; INTRAVENOUS ONCE
Status: CANCELLED
Start: 2024-07-01 | End: 2024-07-01

## 2024-05-01 RX ORDER — ONDANSETRON 4 MG/1
8 TABLET, FILM COATED ORAL
Status: CANCELLED
Start: 2024-07-01

## 2024-05-01 RX ORDER — DIPHENHYDRAMINE HCL 25 MG
50 CAPSULE ORAL
Status: CANCELLED
Start: 2024-07-01

## 2024-05-01 RX ORDER — DIPHENHYDRAMINE HCL 25 MG
50 CAPSULE ORAL
Status: COMPLETED | OUTPATIENT
Start: 2024-05-01 | End: 2024-05-01

## 2024-05-01 RX ORDER — ONDANSETRON 2 MG/ML
8 INJECTION INTRAMUSCULAR; INTRAVENOUS EVERY 6 HOURS PRN
Status: CANCELLED
Start: 2024-07-01

## 2024-05-01 RX ADMIN — HYDROMORPHONE HYDROCHLORIDE 1 MG: 1 INJECTION, SOLUTION INTRAMUSCULAR; INTRAVENOUS; SUBCUTANEOUS at 12:09

## 2024-05-01 RX ADMIN — SODIUM CHLORIDE, POTASSIUM CHLORIDE, SODIUM LACTATE AND CALCIUM CHLORIDE 1000 ML: 600; 310; 30; 20 INJECTION, SOLUTION INTRAVENOUS at 11:59

## 2024-05-01 RX ADMIN — ONDANSETRON 8 MG: 2 INJECTION INTRAMUSCULAR; INTRAVENOUS at 12:09

## 2024-05-01 RX ADMIN — HYDROMORPHONE HYDROCHLORIDE 1 MG: 1 INJECTION, SOLUTION INTRAMUSCULAR; INTRAVENOUS; SUBCUTANEOUS at 14:23

## 2024-05-01 RX ADMIN — KETOROLAC TROMETHAMINE 30 MG: 30 INJECTION, SOLUTION INTRAMUSCULAR; INTRAVENOUS at 12:09

## 2024-05-01 RX ADMIN — HYDROMORPHONE HYDROCHLORIDE 1 MG: 1 INJECTION, SOLUTION INTRAMUSCULAR; INTRAVENOUS; SUBCUTANEOUS at 13:19

## 2024-05-01 RX ADMIN — DIPHENHYDRAMINE HYDROCHLORIDE 50 MG: 25 CAPSULE ORAL at 12:09

## 2024-05-01 NOTE — PROGRESS NOTES
Social Work - Transportation  [facility]    Data/Intervention:  Patient Name: Jennifer Cervantes Goes By: Jennifer    /Age: 2000 (24 year old)    Referral From: [facility]  Reason for Referral:  support requested for patient transportation needs for today's appointment.  Assessment:  called [employer] to arrange ride through patient's insurance. [employer] arranged  for patient from home with [employer]. Patient will need to call [phone] when ready for return ride home.  Plan: Patient is aware of the transportation plan.  available to assist with any other needs.    CARLOS Chavez,LGUDAY  Hematology/Oncology Social Worker  Phone:[phone] Pager: [phone]

## 2024-05-01 NOTE — TELEPHONE ENCOUNTER
0950, this writer offere pt for appt availble for BMT for IVF/Painn at 12:00pm today, Jennifer in agreement    0936 SW notified to assist pt with transportation needs     0950 Scheduling request sent.

## 2024-05-01 NOTE — PROGRESS NOTES
Infusion Nursing Note:  Jennifer Cervantes presents today for add on IVF and pain meds.    Patient seen by provider today: No   present during visit today: Not Applicable.    Note: No labs/no provider. 1L LR given over 2 hours; Given 50mg PO Benadryl, 8mg IVP Zofran, 30mg IVP Toradol, and 1mg IVP Dilaudid Q1 HR for max of 3 doses. Patient reported improvement in pain upon discharge.    Intravenous Access:  Implanted Port.  +BR noted pre and post infusion  De-accessed prior to discharge.    Treatment Conditions:  Met treatment protocol per provider.      Post Infusion Assessment:  Patient tolerated infusion without incident.       Discharge Plan:   Patient discharged in stable condition accompanied by: self.  Departure Mode: Ambulatory.      Allison Wiggins RN

## 2024-05-01 NOTE — PROGRESS NOTES
[facility]    Background: Transitional Care Management program identified per system criteria and reviewed by [facility] Resource Center team for possible outreach.    Assessment: Upon chart review, CCR Team member will not proceed with patient outreach related to this episode of Transitional Care Management program due to reason below:    Patient has active communication with a nurse, provider or care team for reason of post-hospital follow up plan.  Outreach call by CCRC team not indicated to minimize duplicative efforts.     Plan: Transitional Care Management episode addressed appropriately per reason noted above.      KHALIDA Mejía  [facility]    *Connected Care Resource Team does NOT follow patient ongoing. Referrals are identified based on internal discharge reports and the outreach is to ensure patient has an understanding of their discharge instructions.   no clubbing/no cyanosis

## 2024-05-01 NOTE — TELEPHONE ENCOUNTER
Oncology Nurse Triage - Sickle Cell Pain Crisis:    Situation: Jennifer  calling about Sickle Cell Pain Crisis, requesting to be added on for IV fluids and pain medicine    Background:     Patient's last infusion was 4/30/24  Last clinic visit date:4/8/24 Patricia Mantilla   Does patient have active treatment plan?  Yes      Assessment of Symptoms:  Onset/Duration of symptoms: 3 day has not been able to get pain under control     Is it typical sickle cell pain? Yes   Location: generalized body pain   Character: Sharp           Intensity: 9/10    Any radiation of pain, numbness, tingling, weakness, warmth, swelling, discoloration of arms or legs?No     Fever?No  (if yes max temperature recorded in last 24 hours):      Chest Pain Present: No     Shortness of breath: No     Other home therapies tried: HEAT/HEATING PAD and WARM BATH     Last home medication taken and when: 6:45 Took oxycodone ibuprofen     Any Refills Needed?: No     Does patient have transportation & length of time to get to clinic: No needs transportation help         Recommendations:     Placed on infusion call list     If you do not hear from the infusion center by 2pm then you will not be able to get in for an infusion today. If symptoms worsen while waiting for call back, and/or you experience fever, chills, SOB, chest pain, cough, n/v, dizziness, numbness, swelling, discoloration of extremities, then seek emergency evaluation in Emergency Department.     Please note, if you are late for your appt, you risk losing your infusion appt as it may delay another patient's infusion who arrived on time.

## 2024-05-02 ENCOUNTER — PATIENT OUTREACH (OUTPATIENT)
Dept: ONCOLOGY | Facility: CLINIC | Age: 24
End: 2024-05-02
Payer: COMMERCIAL

## 2024-05-02 NOTE — PROGRESS NOTES
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 She had multiple thromboembolic events in 2021 despite adherent anticoagulation use (though warfarin was perpetually low) and there are concerns for chronic thromboembolic disease but did not have pulmonary HTN on a November 2021 cath. She is maintained on chronic PO opioids and twice-weekly infusion visits (since 1/24/22) but has been able to be maintained on this regimen and has stayed out of the ED most of the time with even rarer admissions for most of 2023.     Interval History:  Jennifer is seen for hospital follow-up today. She feels fairly well since discharging 4/29. Her breathing is much improved. She continues to struggle with constant, diffuse pain which is not responding as well to opioids as usual. Despite this, she still feels motivated to decrease her oral opioid use. She inquires about adjusting her limitations to infusion visits and would the option of coming in 3x/week if possible to avoid ED visits. She is planning on looking for a new job soon. She'd like to work in a hospital setting. She does not feel she can return to work in the next 2 weeks and states her employer again needs a letter to support that. She was able to talk to Dr. Duncan while she was admitted and feels she is in a much better place overall with her care team.     Sickle Cell Disease Comprehensive Checklist  Bone Health/Avascular Necrosis Screening/Symptoms (each visit): no new concerns today  Leg Ulcer evaluation (every visit):  no  Hypertension (every visit):stable 11/3/23  Last pulmonary evaluation (asthma, AMAN, pulm HTN): 9/28/22, due for follow-up  Stroke/silent cerebral infarct Hx (Y/N): Yes TIA ~2014, first event ~age 1 with full stroke and R sided weakness  Last PCP Visit: 3/6/23  Vaccines:  PCV13: 5/13/19  Pneumovax (PPSV23): 3/04, 10/09, 7/12/19 (next due 7/2024)  Menactra: 4/2010, 9/2015 (MCV done 8/16/21)  MenB: 9/16/15, 5/13/19  Influenza: 10/2/23  Audiology (chelation): done 2/27/24  Comparison to Previous Audiogram dated 6/6/2022:     Pure Tone Thresholds 250-8000 Hz:    RIGHT: stable    LEFT: stable     High Frequency Audiometry 9,000-16,000Hz:     RIGHT: stable    LEFT: stable     Word Recognition Score:    RIGHT: stable    LEFT: stable    Plan last reviewed with patient: 10/2/23    Patient background: 24 yo F, enjoys movies and kids though there are times where she does not really want to talk to people. Does not have a lot of social support at home.     Sickle Cell Disease History  Primary Hematologist Team: Jose Rafael Duncan  PCP: none  Genotype: SS  Acute Pain Crisis Treatment: (limiting IV)   ER   Dilaudid 1 mg IV q1h up to 3 doses  Toradol 30 mg IV x1   Maintenance IV fluids with LR  Other: Zofran 8 mg IV PRN nausea  Inpatient:  PCA Dilaudid 1 hr q30 minutes, no basal rate  Toradol 30 mg x6h x 4 hr  LR maintenance x 1-2 days  Other Medications: Zofran  ASA  Supportive Care: Docusate, Senna  Chronic Pain Medications:    Home regimen: oxycodone 15 mg p.o. q.4-6 hours p.r.n. breakthrough pain.  She also continues on Voltaren gel, and Zoloft among other medications.    -Also benefits from mental health visits, acupuncture  Baseline Hemoglobin: 7 g/dl without chronic transfusions  Hydroxyurea use: Yes  H/O blood transfusions: Yes, several (iron overload) Most recent 11/20/2021  H/O Transfusion Reactions: no  Antibodies:none  H/O Acute Chest Syndrome: Yes  Last episode:9/05/22 (previously 4/26/21, 10/2019)   ICU/intubation:  not with 9/2022 admission  H/O Stroke: Yes (managed with chronic transfusions in the past, stopped late Spring 2020)  H/O VTE: Yes (2/2021)  H/O Cholecystectomy or Splenectomy: no  H/O Asthma, Pulm HTN, AVN, Leg Ulcers, Nephropathy, Retinopathy, etc: Iron overload, asthma, chronic lung disease, physical limitations from early stroke    ---------------------------------------  Jennifer Cervantes's Goals (reviewed today 5/3/24)    1-3 month goal:  Look for a new job    6 month goal:      12 month goal:      Disease-specific goal(s):  Decrease frequency of ED visits. Decrease opioid qty by 5 tablets with next refill.      Current Outpatient Medications   Medication Sig Dispense Refill    acetaminophen (TYLENOL) 325 MG tablet Take 3 tablets (975 mg) by mouth every 8 hours 270 tablet 0    albuterol (PROAIR HFA/PROVENTIL HFA/VENTOLIN HFA) 108 (90 Base) MCG/ACT inhaler Inhale 2 puffs into the lungs every 6 hours as needed for shortness of breath or wheezing 8.5 g 3    albuterol (PROVENTIL) (2.5 MG/3ML) 0.083% neb solution Take 2 vials (5 mg) by nebulization every 6 hours as needed for shortness of breath or wheezing 90 mL 3    aspirin (ASA) 81 MG chewable tablet Take 1 tablet (81 mg) by mouth 2 times daily 60 tablet 11    budesonide-formoterol (SYMBICORT) 160-4.5 MCG/ACT Inhaler Inhale 2 puffs twice daily plus 1-2 puffs as needed. May use up to 12 puffs per day. 20.4 g 11    cetirizine (ZYRTEC) 10 MG tablet Take 1 tablet (10 mg) by mouth daily 30 tablet 1    deferasirox (JADENU) 360 MG tablet Take 4 tablets (1,440 mg) by mouth every evening 120 tablet 4    diphenhydrAMINE (BENADRYL) 25 MG capsule Take 1 capsule (25 mg) by mouth every 6 hours as needed for itching or allergies 30 capsule 0    EPINEPHrine (ANY BX GENERIC EQUIV) 0.3 MG/0.3ML injection 2-pack Inject 0.3 mLs (0.3 mg) into the muscle as needed for anaphylaxis 1 each 1    Hydroxyurea 1000 MG TABS Take 3,000 mg by mouth daily 90 tablet 3    ibuprofen (ADVIL/MOTRIN) 600  MG tablet Take 600 mg by mouth every 6 hours as needed for moderate pain Using rarely, 2x/week at most      melatonin 5 MG tablet Take 1 tablet (5 mg) by mouth nightly as needed for sleep 30 tablet 1    methocarbamol (ROBAXIN) 750 MG tablet Take 1 tablet (750 mg) by mouth 4 times daily as needed for muscle spasms (during sickle pain crises. Okay to take scheduled while in pain) 60 tablet 1    naloxone (NARCAN) 4 MG/0.1ML nasal spray Spray 1 spray (4 mg) into one nostril alternating nostrils as needed for opioid reversal every 2-3 minutes until assistance arrives 0.2 mL 0    naloxone (NARCAN) 4 MG/0.1ML nasal spray Spray 4 mg into one nostril alternating nostrils as needed for opioid reversal every 2-3 minutes until assistance arrives      omeprazole (PRILOSEC) 20 MG DR capsule Take 1 capsule (20 mg) by mouth daily 30 capsule 0    ondansetron (ZOFRAN) 8 MG tablet Take 1 tablet (8 mg) by mouth every 8 hours as needed for nausea 30 tablet 1    oxyCODONE IR (ROXICODONE) 10 MG tablet Take 1 tablet (10 mg) by mouth every 4 hours as needed for severe pain or breakthrough pain Goal 4 per day. Max 6 per day. 20 tablet 0       Past Medical History  Past Medical History:   Diagnosis Date    Anxiety     Bleeding disorder (H24)     Blood clotting disorder (H24)     Cerebral infarction (H) 2015    Cognitive developmental delay     low IQ. 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 side (H)     right hand contractures    Iron overload due to repeated red blood cell transfusions     Migraines     Multiple subsegmental pulmonary emboli without acute cor pulmonale (H) 02/01/2021    Oppositional defiant behavior     Presence of intrauterine contraceptive device 2/18/2020    Superficial venous thrombosis of arm, right 03/25/2021    Uncomplicated asthma      Past Surgical History:   Procedure Laterality Date    AS  INSERT TUNNELED CV 2 CATH W/O PORT/PUMP      CHOLECYSTECTOMY      CV RIGHT HEART CATH MEASUREMENTS RECORDED N/A 11/18/2021    Procedure: Right Heart Cath;  Surgeon: Jackson Stauffer MD;  Location:  [facility]ATH [facility]    INSERT PORT VASCULAR ACCESS Left 4/21/2021    Procedure: INSERTION, VASCULAR ACCESS PORT (NOT SURE ON SIDE UNTIL REMOVAL);  Surgeon: Rajan More MD;  Location: [facility] OR    IR CHEST PORT PLACEMENT > 5 YRS OF AGE  4/21/2021    IR CVC NON TUNNEL LINE REMOVAL  5/6/2021    IR CVC NON TUNNEL PLACEMENT > 5 YRS  04/07/2020    IR CVC NON TUNNEL PLACEMENT > 5 YRS  4/30/2021    IR CVC NON TUNNEL PLACEMENT > 5 YRS  9/7/2022    IR PORT REMOVAL LEFT  4/21/2021    REMOVE PORT VASCULAR ACCESS Left 4/21/2021    Procedure: REMOVAL, VASCULAR ACCESS PORT LEFT;  Surgeon: Rajan More MD;  Location: [facility] OR    REPAIR TENDON ELBOW Right 10/02/2019    Procedure: Right Elbow Flexor Lengthening, Flexor Pronator Slide Of Wrist and Finger, Thumb Adductor Lengthening;  Surgeon: Anai Franco MD;  Location: [facility] OR    TONSILLECTOMY Bilateral 10/02/2019    Procedure: Bilateral Tonsillectomy;  Surgeon: Farhana Guy MD;  Location: [facility] OR    ZZC BREAST REDUCTION (INCLUDES LIPO) TIER 3 Bilateral 04/23/2019     Allergies   Allergen Reactions    Contrast Dye      Hives and breathing issues    Fish-Derived Products Hives    Seafood Hives    Adhesive Tape Hives     Primipore dressing causes hives    Gadolinium     Iodinated Contrast Media      Social History   Social History     Tobacco Use    Smoking status: Never     Passive exposure: Never    Smokeless tobacco: Never   Substance Use Topics    Alcohol use: Not Currently     Alcohol/week: 0.0 standard drinks of alcohol    Drug use: Never   Her mom lives next door to her.  Past medical history and social history were reviewed.    Physical Examination:  /76 (BP Location: Right arm, Patient Position: Sitting, Cuff Size: Adult Regular)   Pulse 88    Temp 98.8  F (37.1  C) (Oral)   Resp 16   Wt 67.9 kg (149 lb 12.8 oz)   LMP  (LMP Unknown)   SpO2 100%   BMI 25.71 kg/m        Wt Readings from Last 10 Encounters:   05/03/24 67.9 kg (149 lb 12.8 oz)   04/30/24 67.4 kg (148 lb 9.4 oz)   04/19/24 69.3 kg (152 lb 12.8 oz)   04/18/24 68.5 kg (151 lb 0.2 oz)   04/11/24 70.2 kg (154 lb 12.8 oz)   04/08/24 69 kg (152 lb 1.6 oz)   04/05/24 68 kg (150 lb)   03/22/24 67.6 kg (149 lb)   03/18/24 69 kg (152 lb 3.2 oz)   03/17/24 70.3 kg (155 lb)     General: Pleasant female, NAD  Eyes: EOMI, PERRL  Respiratory: Normal effort, no adventitious breath sounds  Ext: no peripheral edema  Neurologic: Grossly nonfocal. A/O x 4.  Skin: No rashes, petechiae, or bruising noted on exposed skin.   Laboratory Data:  Most Recent 3 CBC's:  Recent Labs   Lab Test 04/29/24  1140 04/27/24  0940 04/26/24  0814 04/24/24  0656 04/23/24  1754 04/22/24  1058   WBC  --  8.2  --  8.4 10.9 12.9*   HGB  --  7.7*  --  7.7* 8.1* 6.1*   MCV  --  84  --  86 84 88    396 393 379 369 342   ANEUTAUTO  --   --   --  4.1 7.1 7.9    Most Recent 3 BMP's:  Recent Labs   Lab Test 04/29/24  1140 04/28/24  1501 04/27/24  0940 04/26/24  0814 04/25/24  0819 04/24/24  0614 04/23/24  1754 04/22/24  1058 04/19/24  1101 04/11/24  2252   NA  --   --  137  --   --  139 136 139   < > 137   POTASSIUM 3.4 3.9 3.9 4.4   < > 3.7 3.8 3.8   < > 4.2   CHLORIDE  --   --  100  --   --  106 102 107   < > 103   CO2  --   --  25  --   --  25 25 22   < > 24   BUN  --   --  4.9*  --   --  5.2* 4.7* 7.3   < > 7.7   CR 0.47*  --  0.40* 0.49*  --  0.51 0.53  0.53 0.51   < > 0.50*   ANIONGAP  --   --  12  --   --  8 9 10   < > 10   MICAH  --   --  8.8  --   --  8.0* 8.6 8.3*   < > 8.5*   GLC  --   --  96  --   --  81 77 88   < > 97   PROTTOTAL  --   --   --   --   --   --  6.8 6.7  --  6.8   ALBUMIN  --   --   --   --   --   --  4.2 4.1  --  4.1    < > = values in this interval not displayed.    Most Recent 2 LFT's:  Recent Labs   Lab  Test 04/23/24  1754 04/22/24  1058   AST 55* 48*   ALT 21 14   ALKPHOS 60 53   BILITOTAL 3.6* 3.6*    Most Recent TSH and T4:  Recent Labs   Lab Test 04/23/24  1754   TSH 0.60     Phos/Mag:  Lab Results   Component Value Date    PHOS 4.8 (H) 05/13/2023    PHOS 5.0 (H) 05/12/2023    PHOS 3.6 02/21/2021    MAG 1.7 04/29/2024    MAG 1.9 04/28/2024    MAG 1.7 04/27/2024      I reviewed the above labs today.    Assessment and Plan:  1. Sickle Cell HgbSS Disease  2. Acute pain crises  3. Chronic Pain  4. 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 History of CVA  7. Hearing loss  8. Nausea/vomiting     Jennifer is seen today for hospital follow-up after admission 4/23-4/29 for hypoxia, moderate persistent asthma with possible exacerbation and sickle cell pain. She has had recent discussion with our entire care team (Arely DIAZCC, Dr. Duncan, me) and feels good about the level of communication and respect exhibited at this time. She has no immediate plan to change care providers.     She continues to struggle with chronic pain, noting opioid use is less effective than usual. She has been working on acknowledging the pain and working through this despite achieving previous levels of relief. We again reviewed the ultimate goal of reducing ED visits and overall oral opioid use. Jennifer has done a great job of avoiding any increase in oral oxycodone over the past few months although her ED utilization has increased. She inquires about coming to infusion 3 times per week instead of her current limitation of twice weekly infusions. I agreed to further discuss this with Dr. Duncan although we candidly spoke about how this plays a role in her overall opioid use. Although this may limit ED utilization, it may not necessarily contribute to decrease in opioids since she would be simply be receiving the same medication in a different setting. Jennifer verbalizes clear understanding of this  and continues to work on creative ways to achieve her overall goal. She was clear that she would still like to decrease her oral oxycodone quantity in a few weeks to 15 tablets/fill rather than 20 tablets/fill despite the decision made about infusion visits.     I provided a final work letter today and advised that we would not be providing additional work notes as it is impossible to predict her ability to work based on the chronicity of her symptoms. She is actively looking for a new role that may better fit her current lifestyle and health needs.    She remains on Jadenu but has been taking a break from deferiprone for a few months now. Repeat Ferriscan was performed this morning showing and increase in LIC now at 31.8 compared to 28.5 in September 2023. I will also be discussing possible resumption of deferiprone with Dr. Duncan.    She has not yet scheduled and EGD but continues to have persistent nausea and appetite changes as well as intermittent epigastric pain. I would like her to pursue this to further evaluate for possible gastritis or ulcers. Currently she is scheduled for a GI visit (virtual) 5/20 which I encourage her to attend.    -------------------------------------  Plan:  -GI consult 5/20 for evaluation of persistent n/v and epigastric pain  -Continue Hydrea to 3000mg daily to help lessen frequency of sickle cell pain.   -Continue monthly crizanlizumab infusions.  -Continue slow taper of oxycodone. Currently receiving oxycodone 10 mg every 4 hours PRN, qty 20. Plan to tapering to qty 15 in about 2 weeks. I'll see her prior to this.  -She can self-reduce infusion days/week and we will continue to keep the cap at 2/week for now. I'll discuss a possible increase to 3/week with Dr. Duncan.  -Continue infusion center visits limited to two times per week (Mondays and Fridays ideally although still needs to call to request). Continue diligent home management with current medications, heat, rest,  compression, warm baths. Methocarbamol was recently added which Jennifer is utilizing and finds helpful.  -If unable to manage at home can go to ED  with continued plan to use IV Dilaudid and take a break from ketamine (10/2/23). No PCA use and goal for any admission would still be to discharge by 5 days or less  - Continue Jadenu. Will consider deferiprone based on 5/3 Ferriscan results.  -Continue aspirin BID  -RTC in 2 weeks, coordinate with sidney renny Mantilla CNP  ---  54 minutes spent on the date of the encounter doing chart review, review of test results, interpretation of tests, patient visit, and documentation.    The longitudinal plan of care for the diagnosis(es)/condition(s) as documented were addressed during this visit. Due to the added complexity in care, I will continue to support Jennifer in the subsequent management and with ongoing continuity of care.

## 2024-05-02 NOTE — PROGRESS NOTES
[facility]: Cancer Care Follow-Up Note                                    Discussion with Patient:                                                      Pt contacted writer to discuss pain management and infusion schedule.  Pt has appt with ANDREAS tomorrow and was encouraged to discuss her thoughts with Patricia JIMENEZ and writer will touch base with Patricia and Dr Duncan next week. Pt asked writer to not reach out to ANDREAS or MD until after she had a chance to discuss with Patricia.   Writer asked pt to reach back out to [facility] if she needed help with advocating for herself.          Goals          General     Pain Management (pt-stated)      Notes - Note created  10/13/2023  3:51 PM by Arely Krueger, JOE     Goal Statement: I will establish a plan for preventing and managing pain.  Date Goal set: 10/13/2023  Barriers: disease burden, multiple diagnoses, and transportation  Strengths: motivation, health awareness, and involvement with care team  Date to Achieve By: ongoing  Patient expressed understanding of goal: Yes  Action steps to achieve this goal:  I will take medications as prescribed.   I will call triage with new or worsening pain not controlled by medication.   I will use alternative therapies as directed. (Ice, heat, massage, etc)   I will establish care will palliative care department for ongoing pain management as needed.   I will call triage for medication refills when there are 2-3 days of medication remaining.                 Dates of Treatment:                                                      Infusion given in last 28 days       None            Assessment:                                                        Plan of Care Education Review:   Assessment completed with:: Patient    Plan of Care Education   Plan of Care:: ANDREAS follow-up appointment    Evaluation of Learning  Patient Education Provided: Yes  Readiness:: Acceptance  Method:: Explanation  Response:: Verbalizes understanding            Intervention/Education provided during outreach:                                                         Patient to follow up as scheduled at next appt  Patient to call/MyChart message with updates  Confirmed patient has clinic and triage numbers    Signature:  Arely Krueger RN

## 2024-05-03 ENCOUNTER — HOSPITAL ENCOUNTER (OUTPATIENT)
Dept: MRI IMAGING | Facility: CLINIC | Age: 24
Discharge: HOME | End: 2024-05-03
Attending: PEDIATRICS
Payer: COMMERCIAL

## 2024-05-03 ENCOUNTER — ONCOLOGY VISIT (OUTPATIENT)
Dept: ONCOLOGY | Facility: CLINIC | Age: 24
End: 2024-05-03
Attending: PEDIATRICS
Payer: COMMERCIAL

## 2024-05-03 VITALS
HEART RATE: 88 BPM | OXYGEN SATURATION: 100 % | DIASTOLIC BLOOD PRESSURE: 76 MMHG | SYSTOLIC BLOOD PRESSURE: 113 MMHG | WEIGHT: 149.8 LBS | BODY MASS INDEX: 25.71 KG/M2 | RESPIRATION RATE: 16 BRPM | TEMPERATURE: 98.8 F

## 2024-05-03 DIAGNOSIS — J45.909 ASTHMA, UNSPECIFIED ASTHMA SEVERITY, UNSPECIFIED WHETHER COMPLICATED, UNSPECIFIED WHETHER PERSISTENT: ICD-10-CM

## 2024-05-03 DIAGNOSIS — D57.1 HB-SS DISEASE WITHOUT CRISIS (H): Primary | ICD-10-CM

## 2024-05-03 DIAGNOSIS — E83.111 IRON OVERLOAD DUE TO REPEATED RED BLOOD CELL TRANSFUSIONS: ICD-10-CM

## 2024-05-03 DIAGNOSIS — R10.13 EPIGASTRIC PAIN: ICD-10-CM

## 2024-05-03 PROCEDURE — 74181 MRI ABDOMEN W/O CONTRAST: CPT

## 2024-05-03 PROCEDURE — G2211 COMPLEX E/M VISIT ADD ON: HCPCS | Performed by: REGISTERED NURSE

## 2024-05-03 PROCEDURE — G0463 HOSPITAL OUTPT CLINIC VISIT: HCPCS | Performed by: REGISTERED NURSE

## 2024-05-03 PROCEDURE — 99215 OFFICE O/P EST HI 40 MIN: CPT | Performed by: REGISTERED NURSE

## 2024-05-03 PROCEDURE — 74181 MRI ABDOMEN W/O CONTRAST: CPT | Mod: 26 | Performed by: RADIOLOGY

## 2024-05-03 ASSESSMENT — PAIN SCALES - GENERAL: PAINLEVEL: EXTREME PAIN (9)

## 2024-05-03 NOTE — Clinical Note
5/3/2024         RE: Jennifer Cervantes  [address]  [facility] [address]        Dear Colleague,    Thank you for referring your patient, Jennifer Cervantes, to the [facility]. Please see a copy of my visit note below.    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 She had multiple thromboembolic events in 2021 despite adherent anticoagulation use (though warfarin was perpetually low) and there are concerns for chronic thromboembolic disease but did not have pulmonary HTN on a November 2021 cath. She is maintained on chronic PO opioids and twice-weekly infusion visits (since 1/24/22) but has been able to be maintained on this regimen and has stayed out of the ED most of the time with even rarer admissions for most of 2023.     Interval History:  ***    Sickle Cell Disease Comprehensive Checklist  Bone Health/Avascular Necrosis Screening/Symptoms (each visit): no new concerns today  Leg Ulcer evaluation (every visit): no  Hypertension (every visit):stable 11/3/23  Last pulmonary evaluation (asthma, AMAN, pulm HTN): 9/28/22, due for follow-up  Stroke/silent cerebral infarct Hx (Y/N): Yes TIA ~2014, first event ~age 1 with full stroke and R sided weakness  Last PCP Visit: 3/6/23  Vaccines:  PCV13: 5/13/19  Pneumovax (PPSV23): 3/04, 10/09, 7/12/19 (next due 7/2024)  Menactra: 4/2010, 9/2015 (MCV done 8/16/21)  MenB: 9/16/15, 5/13/19  Influenza: 10/2/23  Audiology (chelation): done 2/27/24  Comparison to Previous Audiogram dated 6/6/2022:      Pure Tone Thresholds 250-8000 Hz:    RIGHT: stable    LEFT: stable     High Frequency Audiometry 9,000-16,000Hz:     RIGHT: stable    LEFT: stable     Word Recognition Score:    RIGHT: stable    LEFT: stable    Plan last reviewed with patient: 10/2/23    Patient background: 24 yo F, enjoys movies and kids though there are times where she does not really want to talk to people. Does not have a lot of social support at home.     Sickle Cell Disease History  Primary Hematologist Team: Jose Rafael Duncan  PCP: none  Genotype: SS  Acute Pain Crisis Treatment: (limiting IV)   ER   Dilaudid 1 mg IV q1h up to 3 doses  Toradol 30 mg IV x1   Maintenance IV fluids with LR  Other: Zofran 8 mg IV PRN nausea  Inpatient:  PCA Dilaudid 1 hr q30 minutes, no basal rate  Toradol 30 mg x6h x 4 hr  LR maintenance x 1-2 days  Other Medications: Zofran  ASA  Supportive Care: Docusate, Senna  Chronic Pain Medications:    Home regimen: oxycodone 15 mg p.o. q.4-6 hours p.r.n. breakthrough pain.  She also continues on Voltaren gel, and Zoloft among other medications.    -Also benefits from mental health visits, acupuncture  Baseline Hemoglobin: 7 g/dl without chronic transfusions  Hydroxyurea use: Yes  H/O blood transfusions: Yes, several (iron overload) Most recent 11/20/2021  H/O Transfusion Reactions: no  Antibodies:none  H/O Acute Chest Syndrome: Yes  Last episode:9/05/22 (previously 4/26/21, 10/2019)   ICU/intubation: not with 9/2022 admission  H/O Stroke: Yes (managed with chronic transfusions in the past, stopped late Spring 2020)  H/O VTE: Yes (2/2021)  H/O Cholecystectomy or Splenectomy: no  H/O Asthma, Pulm HTN, AVN, Leg Ulcers, Nephropathy, Retinopathy, etc: Iron overload, asthma, chronic lung disease, physical limitations from early stroke    ---------------------------------------  eJnnifer Cervantes's Goals (did not review today)    1-3 month goal:      6 month goal:      12 month goal:      Disease-specific goal(s):  Decrease  frequency of ED and infusion center visits. Ultimately would like to decrease oxycodone use.  ---------------------------------------      Current Outpatient Medications   Medication Sig Dispense Refill    acetaminophen (TYLENOL) 325 MG tablet Take 3 tablets (975 mg) by mouth every 8 hours 270 tablet 0    albuterol (PROAIR HFA/PROVENTIL HFA/VENTOLIN HFA) 108 (90 Base) MCG/ACT inhaler Inhale 2 puffs into the lungs every 6 hours as needed for shortness of breath or wheezing 8.5 g 3    albuterol (PROVENTIL) (2.5 MG/3ML) 0.083% neb solution Take 2 vials (5 mg) by nebulization every 6 hours as needed for shortness of breath or wheezing 90 mL 3    aspirin (ASA) 81 MG chewable tablet Take 1 tablet (81 mg) by mouth 2 times daily 60 tablet 11    budesonide-formoterol (SYMBICORT) 160-4.5 MCG/ACT Inhaler Inhale 2 puffs twice daily plus 1-2 puffs as needed. May use up to 12 puffs per day. 20.4 g 11    cetirizine (ZYRTEC) 10 MG tablet Take 1 tablet (10 mg) by mouth daily 30 tablet 1    deferasirox (JADENU) 360 MG tablet Take 4 tablets (1,440 mg) by mouth every evening 120 tablet 4    diphenhydrAMINE (BENADRYL) 25 MG capsule Take 1 capsule (25 mg) by mouth every 6 hours as needed for itching or allergies 30 capsule 0    EPINEPHrine (ANY BX GENERIC EQUIV) 0.3 MG/0.3ML injection 2-pack Inject 0.3 mLs (0.3 mg) into the muscle as needed for anaphylaxis 1 each 1    Hydroxyurea 1000 MG TABS Take 3,000 mg by mouth daily 90 tablet 3    ibuprofen (ADVIL/MOTRIN) 600 MG tablet Take 600 mg by mouth every 6 hours as needed for moderate pain Using rarely, 2x/week at most      melatonin 5 MG tablet Take 1 tablet (5 mg) by mouth nightly as needed for sleep 30 tablet 1    methocarbamol (ROBAXIN) 750 MG tablet Take 1 tablet (750 mg) by mouth 4 times daily as needed for muscle spasms (during sickle pain crises. Okay to take scheduled while in pain) 60 tablet 1    naloxone (NARCAN) 4 MG/0.1ML nasal spray Spray 1 spray (4 mg) into one nostril  alternating nostrils as needed for opioid reversal every 2-3 minutes until assistance arrives 0.2 mL 0    naloxone (NARCAN) 4 MG/0.1ML nasal spray Spray 4 mg into one nostril alternating nostrils as needed for opioid reversal every 2-3 minutes until assistance arrives      omeprazole (PRILOSEC) 20 MG DR capsule Take 1 capsule (20 mg) by mouth daily 30 capsule 0    ondansetron (ZOFRAN) 8 MG tablet Take 1 tablet (8 mg) by mouth every 8 hours as needed for nausea 30 tablet 1    oxyCODONE IR (ROXICODONE) 10 MG tablet Take 1 tablet (10 mg) by mouth every 4 hours as needed for severe pain or breakthrough pain Goal 4 per day. Max 6 per day. 20 tablet 0    predniSONE (DELTASONE) 20 MG tablet Take 2 tablets (40 mg) by mouth daily for 3 days 6 tablet 0       Past Medical History  Past Medical History:   Diagnosis Date    Anxiety     Bleeding disorder (H24)     Blood clotting disorder (H24)     Cerebral infarction (H) 2015    Cognitive developmental delay     low IQ. 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 side (H)     right hand contractures    Iron overload due to repeated red blood cell transfusions     Migraines     Multiple subsegmental pulmonary emboli without acute cor pulmonale (H) 02/01/2021    Oppositional defiant behavior     Presence of intrauterine contraceptive device 2/18/2020    Superficial venous thrombosis of arm, right 03/25/2021    Uncomplicated asthma      Past Surgical History:   Procedure Laterality Date    AS INSERT TUNNELED CV 2 CATH W/O PORT/PUMP      CHOLECYSTECTOMY      CV RIGHT HEART CATH MEASUREMENTS RECORDED N/A 11/18/2021    Procedure: Right Heart Cath;  Surgeon: Jackson Stauffer MD;  Location:  HEART CARDIAC CATH LAB    INSERT PORT VASCULAR ACCESS Left 4/21/2021    Procedure: INSERTION, VASCULAR ACCESS PORT (NOT SURE ON SIDE UNTIL REMOVAL);  Surgeon: Rajan More,  MD;  Location: [facility] OR    IR CHEST PORT PLACEMENT > 5 YRS OF AGE  4/21/2021    IR CVC NON TUNNEL LINE REMOVAL  5/6/2021    IR CVC NON TUNNEL PLACEMENT > 5 YRS  04/07/2020    IR CVC NON TUNNEL PLACEMENT > 5 YRS  4/30/2021    IR CVC NON TUNNEL PLACEMENT > 5 YRS  9/7/2022    IR PORT REMOVAL LEFT  4/21/2021    REMOVE PORT VASCULAR ACCESS Left 4/21/2021    Procedure: REMOVAL, VASCULAR ACCESS PORT LEFT;  Surgeon: Rajan More MD;  Location: [facility] OR    REPAIR TENDON ELBOW Right 10/02/2019    Procedure: Right Elbow Flexor Lengthening, Flexor Pronator Slide Of Wrist and Finger, Thumb Adductor Lengthening;  Surgeon: Anai Franco MD;  Location: [facility] OR    TONSILLECTOMY Bilateral 10/02/2019    Procedure: Bilateral Tonsillectomy;  Surgeon: Farhana Guy MD;  Location: [facility] OR    ZZC BREAST REDUCTION (INCLUDES LIPO) TIER 3 Bilateral 04/23/2019     Allergies   Allergen Reactions    Contrast Dye      Hives and breathing issues    Fish-Derived Products Hives    Seafood Hives    Adhesive Tape Hives     Primipore dressing causes hives    Gadolinium     Iodinated Contrast Media      Social History   Social History     Tobacco Use    Smoking status: Never     Passive exposure: Never    Smokeless tobacco: Never   Substance Use Topics    Alcohol use: Not Currently     Alcohol/week: 0.0 standard drinks of alcohol    Drug use: Never   Her mom lives next door to her.  Past medical history and social history were reviewed.    Physical Examination:  LMP  (LMP Unknown)       Wt Readings from Last 10 Encounters:   04/30/24 67.4 kg (148 lb 9.4 oz)   04/19/24 69.3 kg (152 lb 12.8 oz)   04/18/24 68.5 kg (151 lb 0.2 oz)   04/11/24 70.2 kg (154 lb 12.8 oz)   04/08/24 69 kg (152 lb 1.6 oz)   04/05/24 68 kg (150 lb)   03/22/24 67.6 kg (149 lb)   03/18/24 69 kg (152 lb 3.2 oz)   03/17/24 70.3 kg (155 lb)   03/08/24 68 kg (150 lb)     General: Pleasant female, NAD  Eyes: EOMI, PERRL  Respiratory: Normal effort, no adventitious  breath sounds  Ext: no peripheral edema  Neurologic: Grossly nonfocal. A/O x 4.  Skin: No rashes, petechiae, or bruising noted on exposed skin. Port accessed in left chest  Laboratory Data:  Most Recent 3 CBC's:  Recent Labs   Lab Test 04/29/24  1140 04/27/24  0940 04/26/24  0814 04/24/24  0656 04/23/24  1754 04/22/24  1058   WBC  --  8.2  --  8.4 10.9 12.9*   HGB  --  7.7*  --  7.7* 8.1* 6.1*   MCV  --  84  --  86 84 88    396 393 379 369 342   ANEUTAUTO  --   --   --  4.1 7.1 7.9    Most Recent 3 BMP's:  Recent Labs   Lab Test 04/29/24  1140 04/28/24  1501 04/27/24  0940 04/26/24  0814 04/25/24  0819 04/24/24  0614 04/23/24  1754 04/22/24  1058 04/19/24  1101 04/11/24  2252   NA  --   --  137  --   --  139 136 139   < > 137   POTASSIUM 3.4 3.9 3.9 4.4   < > 3.7 3.8 3.8   < > 4.2   CHLORIDE  --   --  100  --   --  106 102 107   < > 103   CO2  --   --  25  --   --  25 25 22   < > 24   BUN  --   --  4.9*  --   --  5.2* 4.7* 7.3   < > 7.7   CR 0.47*  --  0.40* 0.49*  --  0.51 0.53  0.53 0.51   < > 0.50*   ANIONGAP  --   --  12  --   --  8 9 10   < > 10   MICAH  --   --  8.8  --   --  8.0* 8.6 8.3*   < > 8.5*   GLC  --   --  96  --   --  81 77 88   < > 97   PROTTOTAL  --   --   --   --   --   --  6.8 6.7  --  6.8   ALBUMIN  --   --   --   --   --   --  4.2 4.1  --  4.1    < > = values in this interval not displayed.    Most Recent 2 LFT's:  Recent Labs   Lab Test 04/23/24  1754 04/22/24  1058   AST 55* 48*   ALT 21 14   ALKPHOS 60 53   BILITOTAL 3.6* 3.6*    Most Recent TSH and T4:  Recent Labs   Lab Test 04/23/24  1754   TSH 0.60     Phos/Mag:  Lab Results   Component Value Date    PHOS 4.8 (H) 05/13/2023    PHOS 5.0 (H) 05/12/2023    PHOS 3.6 02/21/2021    MAG 1.7 04/29/2024    MAG 1.9 04/28/2024    MAG 1.7 04/27/2024      I reviewed the above labs today.    Assessment and Plan:  1. Sickle Cell HgbSS Disease  2. Acute pain crises  3. Chronic Pain  4. Iron overload  5. Recurrent VTE/PE but inability to remain  therapeutic on anticoagulation  6. History of CVA  7. Hearing loss  8. Nausea/vomiting     Jennifer is seen today for close follow-up due to ongoing concerns with her care team and goals of care discussions. She voices frustration with her increase in pain lately. She continues to utilize the infusion center twice a week as able which I reiterated is appropriate to do in cases where she is experiencing increased pain. I also stressed to Jennifer that limiting ED use is an ongoing goal in her case although this does not need to prevent her from going to the ED in cases where her symptoms are unmanageable at home. She plans to have further discussion regarding communication concerns and goals of care with Dr. Duncan in late April. I encouraged her to keep this appointment. I will see her shortly before their clinic appointment per her request. We discussed ways to focus her thoughts and goal during that visits, including writing down questions or concerns so that she is able to refer to these points during her visit.    She is considering quitting her job as she has not been able to work for many months. She did not request an additional work letter today. Her GI symptoms are improving although she reports persistent epigastric pain. The previously requested upper GI endoscopy was not scheduled and I have requested this today. She is also due for a Ferriscan which we will attempt to schedule prior to her visit with Dr. Duncan in late April.     I again offered to arrange a mental health/counseling referral for her anxiety concerns. She is focused on pharmacologic management of anxiety, specifically with Xanax which she's used in the past, although based on previous discussion this is not a safe option to use concurrently with chronic opioids. I ensured her that CBT could be initiated at any time she is ready. Although she is able to open up to certain members of her care team, she feels this is generally very difficult  for her and would limit her success with therapy.     -------------------------------------  Plan:  -Upper GI endoscopy for evaluation of persistent n/v  -Continue Hydrea to 3000mg daily to help lessen frequency of sickle cell pain.   -Continue monthly crizanlizumab infusions.  -Continue slow taper of oxycodone. Currently receiving oxycodone 10 mg every 4 hours PRN, qty 20. Plan to tapering to qty 15 when she is agreeable.  -She can self-reduce infusion days/week and we will continue to keep the cap at 2/week for now.  -Continue infusion center visits limited to two times per week (Mondays and Fridays ideally although still needs to call to request). Continue diligent home management with current medications, heat, rest, compression, warm baths. Methocarbamol was recently added which Jennifer is utilizing and finds helpful.  -If unable to manage at home can go to ED  with continued plan to use IV Dilaudid and take a break from ketamine (10/2/23). No PCA use and goal for any admission would still be to discharge by 5 days or less  - Continue Jadenu. Repeat Ferriscan in 4-6 months (January-March 2024). Will consider deferiprone after Ferriscan  -Continue aspirin BID  -RTC ***    Patricia Mantilla CNP  ---  *** minutes spent on the date of the encounter doing {2021 E&M time in:335190}.      The longitudinal plan of care for the diagnosis(es)/condition(s) as documented were addressed during this visit. Due to the added complexity in care, I will continue to support Jennifer in the subsequent management and with ongoing continuity of care.                    Again, thank you for allowing me to participate in the care of your patient.        Sincerely,        Patricia Mantilla CNP

## 2024-05-03 NOTE — LETTER
May 3, 2024      Jennifer Cervantes  [address]  [facility] [address]        To Whom It May Concern,        [facility]  [address] [facility] [address]  Phone: [phone]  Fax: [phone]               April 11, 2024        Jennifer Cervantes  [address]  [facility] [address]           To Whom It May Concern,      Jennifer Cervantes should remain out of work through 5/13/24 due to her current medical condition requiring frequent clinic visits. We will continue to reassess her ability to resume a modified work schedule moving forward.        Please contact me for questions or concerns.        Sincerely,           Patricia Mantilla CNP         Sincerely,        Patricia Mantilla CNP

## 2024-05-03 NOTE — TELEPHONE ENCOUNTER
REFERRAL INFORMATION:  Referring Provider:  Patricia Mantilla CNP  Referring Clinic:  [facility]  Reason for Visit/Diagnosis: Epigastric pain     FUTURE VISIT INFORMATION:  Appointment Date:   5/20/24  Appointment Time: 9:00     NOTES STATUS DETAILS   OFFICE NOTE from Referring Provider Internal OV 4/8/24, 2/5/24-Patricia Mantilla CNP   MEDICATION LIST Internal         ENDOSCOPY  Care Everywhere 2/6/20-[facility]   STOOL TESTING Internal 1/23/24   PERTINENT LABS Internal    PATHOLOGY REPORTS (RELATED) Care Everywhere 2/6/20-[facility]    IMAGING (CT, MRI, EGD, MRCP, Small Bowel Follow Through/SBT, MR/CT Enterography) Internal XR chest-4/23/24-7/10/21    MRI abdomen 5/3/24-8/8/21    CT chest 4/23/24-11/10/21    CT abdomen 7/10/21    US abdomen 7/8/21

## 2024-05-03 NOTE — NURSING NOTE
"Oncology Rooming Note    May 3, 2024 1:50 PM   Jennifer Cervantes is a 24 year old female who presents for:    Chief Complaint   Patient presents with    Oncology Clinic Visit     RTN for sickle Cell Anemia     Initial Vitals: /76 (BP Location: Right arm, Patient Position: Sitting, Cuff Size: Adult Regular)   Pulse 88   Temp 98.8  F (37.1  C) (Oral)   Resp 16   Wt 67.9 kg (149 lb 12.8 oz)   LMP  (LMP Unknown)   SpO2 100%   BMI 25.71 kg/m   Estimated body mass index is 25.71 kg/m  as calculated from the following:    Height as of 3/17/24: 1.626 m (5' 4\").    Weight as of this encounter: 67.9 kg (149 lb 12.8 oz). Body surface area is 1.75 meters squared.  Extreme Pain (9) Comment: sharp stabbing and shooting pain   No LMP recorded (lmp unknown). Patient has had an implant.  Allergies reviewed: Yes  Medications reviewed: Yes    Medications: Medication refills not needed today.  Pharmacy name entered into [facility]: [facility] [place], [place] - [address] [place] [address] [phone]    Frailty Screening:   Is the patient here for a new oncology consult visit in cancer care? 2. No      Clinical concerns:  None      Mitch Shaffer"

## 2024-05-03 NOTE — LETTER
May 3, 2024      Jennifer Cervantes  [address]  [facility] [address]        To Whom It May Concern,            Jennifer Cervantes should remain out of work through 5/13/24 due to her current medical condition requiring frequent clinic visits. We will continue to reassess her ability to resume a modified work schedule moving forward.        Please contact me for questions or concerns.        Sincerely,           Patricia Mantilla CNP       Sincerely,        Patricia Mantilla CNP

## 2024-05-06 ENCOUNTER — NURSE TRIAGE (OUTPATIENT)
Dept: ONCOLOGY | Facility: CLINIC | Age: 24
End: 2024-05-06
Payer: COMMERCIAL

## 2024-05-06 ENCOUNTER — INFUSION THERAPY VISIT (OUTPATIENT)
Dept: TRANSPLANT | Facility: CLINIC | Age: 24
End: 2024-05-06
Attending: PEDIATRICS
Payer: COMMERCIAL

## 2024-05-06 ENCOUNTER — PATIENT OUTREACH (OUTPATIENT)
Dept: CARE COORDINATION | Facility: CLINIC | Age: 24
End: 2024-05-06
Payer: COMMERCIAL

## 2024-05-06 ENCOUNTER — HOSPITAL ENCOUNTER (OUTPATIENT)
Facility: CLINIC | Age: 24
End: 2024-05-06
Attending: INTERNAL MEDICINE | Admitting: INTERNAL MEDICINE
Payer: COMMERCIAL

## 2024-05-06 VITALS
TEMPERATURE: 98.2 F | OXYGEN SATURATION: 95 % | RESPIRATION RATE: 16 BRPM | HEART RATE: 100 BPM | DIASTOLIC BLOOD PRESSURE: 79 MMHG | SYSTOLIC BLOOD PRESSURE: 117 MMHG

## 2024-05-06 DIAGNOSIS — G81.10 SPASTIC HEMIPLEGIA, UNSPECIFIED ETIOLOGY, UNSPECIFIED LATERALITY (H): ICD-10-CM

## 2024-05-06 DIAGNOSIS — D57.00 SICKLE CELL PAIN CRISIS (H): Primary | ICD-10-CM

## 2024-05-06 DIAGNOSIS — D57.00 SICKLE CELL PAIN CRISIS (H): ICD-10-CM

## 2024-05-06 PROCEDURE — 250N000013 HC RX MED GY IP 250 OP 250 PS 637: Performed by: PEDIATRICS

## 2024-05-06 PROCEDURE — 96361 HYDRATE IV INFUSION ADD-ON: CPT

## 2024-05-06 PROCEDURE — 96376 TX/PRO/DX INJ SAME DRUG ADON: CPT

## 2024-05-06 PROCEDURE — 250N000011 HC RX IP 250 OP 636: Performed by: PEDIATRICS

## 2024-05-06 PROCEDURE — 96375 TX/PRO/DX INJ NEW DRUG ADDON: CPT

## 2024-05-06 PROCEDURE — 258N000003 HC RX IP 258 OP 636: Performed by: PEDIATRICS

## 2024-05-06 PROCEDURE — 96374 THER/PROPH/DIAG INJ IV PUSH: CPT

## 2024-05-06 RX ORDER — HEPARIN SODIUM (PORCINE) LOCK FLUSH IV SOLN 100 UNIT/ML 100 UNIT/ML
5 SOLUTION INTRAVENOUS
Status: DISCONTINUED | OUTPATIENT
Start: 2024-05-06 | End: 2024-05-06 | Stop reason: HOSPADM

## 2024-05-06 RX ORDER — DIPHENHYDRAMINE HCL 25 MG
50 CAPSULE ORAL
Status: COMPLETED | OUTPATIENT
Start: 2024-05-06 | End: 2024-05-06

## 2024-05-06 RX ORDER — ONDANSETRON 2 MG/ML
8 INJECTION INTRAMUSCULAR; INTRAVENOUS EVERY 6 HOURS PRN
Status: DISCONTINUED | OUTPATIENT
Start: 2024-05-06 | End: 2024-05-06 | Stop reason: HOSPADM

## 2024-05-06 RX ORDER — DIPHENHYDRAMINE HCL 25 MG
50 CAPSULE ORAL
Status: CANCELLED
Start: 2024-07-01

## 2024-05-06 RX ORDER — ONDANSETRON 2 MG/ML
8 INJECTION INTRAMUSCULAR; INTRAVENOUS EVERY 6 HOURS PRN
Status: CANCELLED
Start: 2024-07-01

## 2024-05-06 RX ORDER — OXYCODONE HYDROCHLORIDE 10 MG/1
10 TABLET ORAL EVERY 4 HOURS PRN
Qty: 20 TABLET | Refills: 0 | Status: SHIPPED | OUTPATIENT
Start: 2024-05-06 | End: 2024-05-13

## 2024-05-06 RX ORDER — HEPARIN SODIUM,PORCINE 10 UNIT/ML
5 VIAL (ML) INTRAVENOUS
Status: CANCELLED | OUTPATIENT
Start: 2024-07-01

## 2024-05-06 RX ORDER — ONDANSETRON 4 MG/1
8 TABLET, FILM COATED ORAL
Status: CANCELLED
Start: 2024-07-01

## 2024-05-06 RX ORDER — KETOROLAC TROMETHAMINE 30 MG/ML
30 INJECTION, SOLUTION INTRAMUSCULAR; INTRAVENOUS ONCE
Status: CANCELLED
Start: 2024-07-01 | End: 2024-07-01

## 2024-05-06 RX ORDER — KETOROLAC TROMETHAMINE 30 MG/ML
30 INJECTION, SOLUTION INTRAMUSCULAR; INTRAVENOUS ONCE
Status: COMPLETED | OUTPATIENT
Start: 2024-05-06 | End: 2024-05-06

## 2024-05-06 RX ORDER — HEPARIN SODIUM (PORCINE) LOCK FLUSH IV SOLN 100 UNIT/ML 100 UNIT/ML
5 SOLUTION INTRAVENOUS
Status: CANCELLED | OUTPATIENT
Start: 2024-07-01

## 2024-05-06 RX ADMIN — ONDANSETRON 8 MG: 2 INJECTION INTRAMUSCULAR; INTRAVENOUS at 09:55

## 2024-05-06 RX ADMIN — SODIUM CHLORIDE, POTASSIUM CHLORIDE, SODIUM LACTATE AND CALCIUM CHLORIDE 1000 ML: 600; 310; 30; 20 INJECTION, SOLUTION INTRAVENOUS at 09:52

## 2024-05-06 RX ADMIN — KETOROLAC TROMETHAMINE 30 MG: 30 INJECTION, SOLUTION INTRAMUSCULAR at 10:02

## 2024-05-06 RX ADMIN — Medication 5 ML: at 12:32

## 2024-05-06 RX ADMIN — HYDROMORPHONE HYDROCHLORIDE 1 MG: 1 INJECTION, SOLUTION INTRAMUSCULAR; INTRAVENOUS; SUBCUTANEOUS at 10:03

## 2024-05-06 RX ADMIN — DIPHENHYDRAMINE HYDROCHLORIDE 50 MG: 25 CAPSULE ORAL at 09:54

## 2024-05-06 RX ADMIN — HYDROMORPHONE HYDROCHLORIDE 1 MG: 1 INJECTION, SOLUTION INTRAMUSCULAR; INTRAVENOUS; SUBCUTANEOUS at 11:56

## 2024-05-06 RX ADMIN — HYDROMORPHONE HYDROCHLORIDE 1 MG: 1 INJECTION, SOLUTION INTRAMUSCULAR; INTRAVENOUS; SUBCUTANEOUS at 10:57

## 2024-05-06 ASSESSMENT — PAIN SCALES - GENERAL: PAINLEVEL: EXTREME PAIN (9)

## 2024-05-06 NOTE — TELEPHONE ENCOUNTER
Caller: Writer to patient    Rescheduled: Yes,   Procedure: Upper Endoscopy [EGD]    Date: 5/9/2024   Location: [facility]; [address] [place] [address], [address] Floor, [place] [address]    Surgeon: Say   Sedation Level Scheduled  MAC ,  Reason for Sedation Level Order   Instructions updated and sent: Leonarda     Does patient need PAC or Pre -Op Rescheduled? : No       Did you cancel or rescheduled an EUS procedure? No.

## 2024-05-06 NOTE — TELEPHONE ENCOUNTER
Oncology Nurse Triage - Sickle Cell Pain Crisis:    Situation: Jennifer  calling about Sickle Cell Pain Crisis, requesting to be added on for IV fluids and pain medicine    Background:     Patient's last infusion was 05/01/24  Last clinic visit date:05/03/24 w/Patricia Mantilla  Does patient have active treatment plan?  Yes      Assessment of Symptoms:  Onset/Duration of symptoms: 4 day    Is it typical sickle cell pain? Yes   Location: Back  Character: Sharp           Intensity: 9/10    Any radiation of pain, numbness, tingling, weakness, warmth, swelling, discoloration of arms or legs?No     Fever?No  (if yes max temperature recorded in last 24 hours):      Chest Pain Present: No     Shortness of breath: No     Other home therapies tried: HEAT/HEATING PAD and WARM BATH     Last home medication taken and when: ibuprofen and oxycodone 0600    Any Refills Needed?: Yes, Oxycodone    Does patient have transportation & length of time to get to clinic: No, however, if an appt opens up suddenly and requires her to be in clinic quickly, she can try to find transportation.        Recommendations:     Added to infusion wait list for IVF/pain meds per protocol.      If you do not hear from the infusion center by 2pm then you will not be able to get in for an infusion today. If symptoms worsen while waiting for call back, and/or you experience fever, chills, SOB, chest pain, cough, n/v, dizziness, numbness, swelling, discoloration of extremities, then seek emergency evaluation in Emergency Department.     Please note, if you are late for your appt, you risk losing your infusion appt as it may delay another patient's infusion who arrived on time.

## 2024-05-06 NOTE — TELEPHONE ENCOUNTER
Available time with BMT at 1000. Call to pt to verify they are able to make appt. Pt confirming she is able to make appt. Message sent to CCOD and SW to assist with scheduling and transportation.

## 2024-05-06 NOTE — NURSING NOTE
"Oncology Rooming Note    May 6, 2024 2:58 PM   Jennifer Cervantes is a 24 year old female who presents for:    Chief Complaint   Patient presents with    Infusion     Add on infusion appointment for IVF and pain medications. History of sickle cell.     Initial Vitals: /79 (BP Location: Right arm, Patient Position: Sitting, Cuff Size: Adult Regular)   Pulse 100   Temp 98.2  F (36.8  C) (Oral)   Resp 16   LMP  (LMP Unknown)   SpO2 95%  Estimated body mass index is 25.71 kg/m  as calculated from the following:    Height as of 3/17/24: 1.626 m (5' 4\").    Weight as of 5/3/24: 67.9 kg (149 lb 12.8 oz). There is no height or weight on file to calculate BSA.  Extreme Pain (9) Comment: Data Unavailable   No LMP recorded (lmp unknown). Patient has had an implant.  Allergies reviewed: Yes  Medications reviewed: Yes    Medications: refill for oxycodone sent by cassidy [employer]ugenia ANDREAS.  Pharmacy name entered into [facility]: [facility] [place], [place] - [address] [facility] [phone]    Frailty Screening:   Is the patient here for a new oncology consult visit in cancer care? 2. No      Clinical concerns: none       Jamaica Napoles RN              "

## 2024-05-06 NOTE — TELEPHONE ENCOUNTER
Narcotic Refill Request    Medication(s) requested:  Oxycodone  Person Requesting Refill: Patient  What pain is the medication treating: Sickle Cell pain  How is the medication being taken?:Every 6 hrs but has had to take more frequently due to recent increase in pain  Does pt have enough for today? No  Is pain being adequately controlled on the current regimen?: Yes  Experiencing any side effects from medication?: No    Date of most recent appointment:  05/03/24 wMikael Mantilla  Any No Show Visits: No  Next appointment:   06/14/24 wMikael Matnilla  Last fill date and by whom:  04/29/24 by Patricia Mantilla   Reviewed: No access    Routed provider: Ptaricia Mantilla

## 2024-05-06 NOTE — PROGRESS NOTES
Infusion Nursing Note:  Jennifer Cervantes presents today for IV fluids and pain m.    Patient seen by provider today: No   present during visit today: Not Applicable.    Note:   Pt received a liter of LR over two hours.    Pt received 50 mg po benadryl, 8 mg IV zofran, 30 mg IV toradol.    Pt received 1 mg dilaudid IV every hour for a total of 3 doses (3 mg dilaudid total).      Intravenous Access:  Pfeiffer.    Treatment Conditions:  Not Applicable.      Post Infusion Assessment:  Patient tolerated infusion without incident.  Blood return noted pre and post infusion.  Site patent and intact, free from redness, edema or discomfort.  No evidence of extravasations.  Access discontinued per protocol.       Discharge Plan:   Discharge instructions reviewed with: Patient.  Patient and/or family verbalized understanding of discharge instructions and all questions answered.  Patient discharged in stable condition accompanied by: self.  Departure Mode: Ambulatory.      Jamaica Napoles RN

## 2024-05-06 NOTE — PROGRESS NOTES
Social Work - Transportation  [facility]    Data/Intervention:  Patient Name: Jennifer Cervantes Goes By: Jennifer    /Age: 2000 (24 year old)    Referral From: [facility]  Reason for Referral:  support requested for patient transportation needs for today's appointment.  Assessment:  called [employer] to arrange ride through patient's insurance. [employer] arranged  for patient from home with [employer]. Patient will need to call [phone] when ready for return ride home.  Plan: Patient is aware of the transportation plan.  available to assist with any other needs.    CARLOS Chavez,LGUDAY  Hematology/Oncology Social Worker  Phone:[phone] Pager: [phone]

## 2024-05-07 ENCOUNTER — TELEPHONE (OUTPATIENT)
Dept: GASTROENTEROLOGY | Facility: CLINIC | Age: 24
End: 2024-05-07
Payer: COMMERCIAL

## 2024-05-07 NOTE — TELEPHONE ENCOUNTER
Pre assessment completed for upcoming procedure.   (Please see previous telephone encounter notes for complete details)      Procedure details:    Arrival time and facility location reviewed.    Pre op exam needed? N/A    Designated  policy reviewed. Instructed to have someone stay 24  hours post procedure.       Medication review:    Medications reviewed. Please see supporting documentation below. Holding recommendations discussed (if applicable).       Prep for procedure:     Procedure prep instructions reviewed.        Any additional information needed:  N/A      Patient  verbalized understanding and had no questions or concerns at this time.      Zaynab Gresham RN  Endoscopy Procedure Pre Assessment RN  [phone] option 4

## 2024-05-07 NOTE — TELEPHONE ENCOUNTER
Pre visit planning completed.      Procedure details:    Patient scheduled for Upper endoscopy (EGD) on 5/9/2024.     Arrival time: 0745. Procedure time 0915    Facility location: [facility]; [address] [place] [address], 3rd Floor, [place] [address]. Check in location: Main entrance at registration desk.    Sedation type: MAC    Pre op exam needed? N/A    Indication for procedure: Epigastric pain [R10.13]       Chart review:     Electronic implanted devices? No    Recent diagnosis of diverticulitis within the last 6 weeks? N/A    Diabetic? No      Medication review:    Anticoagulants? No    NSAIDS? Yes.  Ibuprofen (Advil, Motrin).  Holding interval of 1 day before procedure.    Other medication HOLDING recommendations:  N/A      Prep for procedure:     Prep instructions sent via [employer]         Zaynab Gresham RN  Endoscopy Procedure Pre Assessment RN  [phone] option 4

## 2024-05-08 ENCOUNTER — INFUSION THERAPY VISIT (OUTPATIENT)
Dept: TRANSPLANT | Facility: CLINIC | Age: 24
End: 2024-05-08
Attending: INTERNAL MEDICINE
Payer: COMMERCIAL

## 2024-05-08 ENCOUNTER — ANESTHESIA EVENT (OUTPATIENT)
Dept: GASTROENTEROLOGY | Facility: CLINIC | Age: 24
End: 2024-05-08

## 2024-05-08 ENCOUNTER — NURSE TRIAGE (OUTPATIENT)
Dept: ONCOLOGY | Facility: CLINIC | Age: 24
End: 2024-05-08
Payer: COMMERCIAL

## 2024-05-08 VITALS
OXYGEN SATURATION: 95 % | RESPIRATION RATE: 18 BRPM | SYSTOLIC BLOOD PRESSURE: 123 MMHG | TEMPERATURE: 98.7 F | DIASTOLIC BLOOD PRESSURE: 71 MMHG | HEART RATE: 98 BPM

## 2024-05-08 DIAGNOSIS — D57.00 SICKLE CELL PAIN CRISIS (H): Primary | ICD-10-CM

## 2024-05-08 DIAGNOSIS — G81.10 SPASTIC HEMIPLEGIA, UNSPECIFIED ETIOLOGY, UNSPECIFIED LATERALITY (H): ICD-10-CM

## 2024-05-08 PROCEDURE — 250N000011 HC RX IP 250 OP 636: Performed by: PEDIATRICS

## 2024-05-08 PROCEDURE — 258N000003 HC RX IP 258 OP 636: Performed by: PEDIATRICS

## 2024-05-08 PROCEDURE — 96375 TX/PRO/DX INJ NEW DRUG ADDON: CPT

## 2024-05-08 PROCEDURE — 96376 TX/PRO/DX INJ SAME DRUG ADON: CPT

## 2024-05-08 PROCEDURE — 250N000013 HC RX MED GY IP 250 OP 250 PS 637: Performed by: PEDIATRICS

## 2024-05-08 PROCEDURE — 96361 HYDRATE IV INFUSION ADD-ON: CPT

## 2024-05-08 PROCEDURE — 96374 THER/PROPH/DIAG INJ IV PUSH: CPT

## 2024-05-08 RX ORDER — ONDANSETRON 2 MG/ML
8 INJECTION INTRAMUSCULAR; INTRAVENOUS EVERY 6 HOURS PRN
Status: CANCELLED
Start: 2024-07-01

## 2024-05-08 RX ORDER — LIDOCAINE 40 MG/G
CREAM TOPICAL
Status: CANCELLED | OUTPATIENT
Start: 2024-05-08

## 2024-05-08 RX ORDER — DIPHENHYDRAMINE HCL 25 MG
50 CAPSULE ORAL
Status: COMPLETED | OUTPATIENT
Start: 2024-05-08 | End: 2024-05-08

## 2024-05-08 RX ORDER — ONDANSETRON 2 MG/ML
8 INJECTION INTRAMUSCULAR; INTRAVENOUS EVERY 6 HOURS PRN
Status: DISCONTINUED | OUTPATIENT
Start: 2024-05-08 | End: 2024-05-08 | Stop reason: HOSPADM

## 2024-05-08 RX ORDER — DIPHENHYDRAMINE HCL 25 MG
50 CAPSULE ORAL
Status: CANCELLED
Start: 2024-07-01

## 2024-05-08 RX ORDER — KETOROLAC TROMETHAMINE 30 MG/ML
30 INJECTION, SOLUTION INTRAMUSCULAR; INTRAVENOUS ONCE
Status: CANCELLED
Start: 2024-07-01 | End: 2024-07-01

## 2024-05-08 RX ORDER — HEPARIN SODIUM,PORCINE 10 UNIT/ML
5 VIAL (ML) INTRAVENOUS
Status: CANCELLED | OUTPATIENT
Start: 2024-07-01

## 2024-05-08 RX ORDER — ONDANSETRON 4 MG/1
8 TABLET, FILM COATED ORAL
Status: CANCELLED
Start: 2024-07-01

## 2024-05-08 RX ORDER — ONDANSETRON 2 MG/ML
4 INJECTION INTRAMUSCULAR; INTRAVENOUS
Status: CANCELLED | OUTPATIENT
Start: 2024-05-08

## 2024-05-08 RX ORDER — HEPARIN SODIUM (PORCINE) LOCK FLUSH IV SOLN 100 UNIT/ML 100 UNIT/ML
5 SOLUTION INTRAVENOUS
Status: CANCELLED | OUTPATIENT
Start: 2024-07-01

## 2024-05-08 RX ORDER — HEPARIN SODIUM (PORCINE) LOCK FLUSH IV SOLN 100 UNIT/ML 100 UNIT/ML
5 SOLUTION INTRAVENOUS
Status: DISCONTINUED | OUTPATIENT
Start: 2024-05-08 | End: 2024-05-08 | Stop reason: HOSPADM

## 2024-05-08 RX ORDER — KETOROLAC TROMETHAMINE 30 MG/ML
30 INJECTION, SOLUTION INTRAMUSCULAR; INTRAVENOUS ONCE
Status: COMPLETED | OUTPATIENT
Start: 2024-05-08 | End: 2024-05-08

## 2024-05-08 RX ADMIN — KETOROLAC TROMETHAMINE 30 MG: 30 INJECTION, SOLUTION INTRAMUSCULAR; INTRAVENOUS at 09:57

## 2024-05-08 RX ADMIN — HYDROMORPHONE HYDROCHLORIDE 1 MG: 1 INJECTION, SOLUTION INTRAMUSCULAR; INTRAVENOUS; SUBCUTANEOUS at 11:56

## 2024-05-08 RX ADMIN — SODIUM CHLORIDE, POTASSIUM CHLORIDE, SODIUM LACTATE AND CALCIUM CHLORIDE 1000 ML: 600; 310; 30; 20 INJECTION, SOLUTION INTRAVENOUS at 09:51

## 2024-05-08 RX ADMIN — HYDROMORPHONE HYDROCHLORIDE 1 MG: 1 INJECTION, SOLUTION INTRAMUSCULAR; INTRAVENOUS; SUBCUTANEOUS at 11:04

## 2024-05-08 RX ADMIN — ONDANSETRON 8 MG: 2 INJECTION INTRAMUSCULAR; INTRAVENOUS at 09:57

## 2024-05-08 RX ADMIN — DIPHENHYDRAMINE HYDROCHLORIDE 50 MG: 25 CAPSULE ORAL at 09:56

## 2024-05-08 RX ADMIN — Medication 5 ML: at 12:36

## 2024-05-08 RX ADMIN — HYDROMORPHONE HYDROCHLORIDE 1 MG: 1 INJECTION, SOLUTION INTRAMUSCULAR; INTRAVENOUS; SUBCUTANEOUS at 09:58

## 2024-05-08 ASSESSMENT — PAIN SCALES - GENERAL: PAINLEVEL: EXTREME PAIN (9)

## 2024-05-08 NOTE — PROGRESS NOTES
Infusion Nursing Note:  Jennifer Cervantes presents today for IV fluids and pain medications.    Patient seen by provider today: No   present during visit today: Not Applicable.    Note:   Pt received a liter of LR over 2 hours. Pt given 8 mg zofran IV, 50 mg po benadryl, 30 mg IV toradol. Pt received 1 mg IV dilaudid every hour for 3 doses (3 mg dilaudid total). Pain decreased from 9/10 to 6/10.    Pt continues to have left side abdominal pain when discharged; secure chat with Patricia Mantilla who recommended rest and heat packs, and ER if she develops nausea, vomiting, or fever. Per patient she has an EGD scheduled for tomorrow for further evaluation of abdominal pain.     Intravenous Access:  Implanted Port.    Treatment Conditions:  Not Applicable.      Post Infusion Assessment:  Patient tolerated infusion without incident.  Blood return noted pre and post infusion.  Site patent and intact, free from redness, edema or discomfort.  No evidence of extravasations.  Access discontinued per protocol.       Discharge Plan:   Discharge instructions reviewed with: Patient.  Patient and/or family verbalized understanding of discharge instructions and all questions answered.  Patient discharged in stable condition accompanied by: self.  Departure Mode: Ambulatory.      Jamaica Napoles RN

## 2024-05-08 NOTE — TELEPHONE ENCOUNTER
Oncology Nurse Triage - Sickle Cell Pain Crisis:    Situation: Jennifer  calling about Sickle Cell Pain Crisis, requesting to be added on for IV fluids and pain medicine    Background:     Patient's last infusion was 5/6/2024  Last clinic visit date:5/3/2024  Does patient have active treatment plan?  Yes      Assessment of Symptoms:  Onset/Duration of symptoms: 1 day    Is it typical sickle cell pain? Yes   Location: feels inside stomach, rib cages  Character: Sharp           Intensity: 9/10    Any radiation of pain, numbness, tingling, weakness, warmth, swelling, discoloration of arms or legs?No     Fever?No    Chest Pain Present: No     Shortness of breath: No     Other home therapies tried: HEAT/HEATING PAD and WARM BATH     Last home medication taken and when: oxycodone around 6am    Any Refills Needed?: No     Does patient have transportation & length of time to get to clinic: Yes         Recommendations:   Meets protocol no labs    0728 Offering appt at 10:00am with BMT today, Pt in agreement with plan. For today since  out of clinic, pt states is able to figure out transportation needs for today's infusion.     0734 Scheduling request sent to [facility]    Please note, if you are late for your appt, you risk losing your infusion appt as it may delay another patient's infusion who arrived on time.           
1.1

## 2024-05-08 NOTE — NURSING NOTE
"Oncology Rooming Note    May 8, 2024 10:16 AM   Jennifer Cervantes is a 24 year old female who presents for:    Chief Complaint   Patient presents with    Infusion     Add on infusion appointment for IVF and pain medication. History of sickle cell disease.     Initial Vitals: /71 (BP Location: Right arm, Patient Position: Sitting, Cuff Size: Adult Regular)   Pulse 98   Temp 98.7  F (37.1  C) (Oral)   Resp 18   LMP  (LMP Unknown)   SpO2 95%  Estimated body mass index is 25.71 kg/m  as calculated from the following:    Height as of 3/17/24: 1.626 m (5' 4\").    Weight as of 5/3/24: 67.9 kg (149 lb 12.8 oz). There is no height or weight on file to calculate BSA.  Extreme Pain (9) Comment: Data Unavailable   No LMP recorded (lmp unknown). Patient has had an implant.  Allergies reviewed: Yes  Medications reviewed: Yes    Medications: Medication refills not needed today.  Pharmacy name entered into [facility]: [place], [place] - [facility] [phone]    Frailty Screening:   Is the patient here for a new oncology consult visit in cancer care? 2. No      Clinical concerns: none       Jamaica Napoles RN              "

## 2024-05-09 ENCOUNTER — TELEPHONE (OUTPATIENT)
Dept: GASTROENTEROLOGY | Facility: CLINIC | Age: 24
End: 2024-05-09
Payer: COMMERCIAL

## 2024-05-09 ENCOUNTER — NURSE TRIAGE (OUTPATIENT)
Dept: ONCOLOGY | Facility: CLINIC | Age: 24
End: 2024-05-09
Payer: COMMERCIAL

## 2024-05-09 ENCOUNTER — ANESTHESIA (OUTPATIENT)
Dept: GASTROENTEROLOGY | Facility: CLINIC | Age: 24
End: 2024-05-09

## 2024-05-09 ENCOUNTER — INFUSION THERAPY VISIT (OUTPATIENT)
Dept: TRANSPLANT | Facility: CLINIC | Age: 24
End: 2024-05-09
Attending: PEDIATRICS
Payer: COMMERCIAL

## 2024-05-09 VITALS
TEMPERATURE: 98.6 F | DIASTOLIC BLOOD PRESSURE: 75 MMHG | OXYGEN SATURATION: 95 % | RESPIRATION RATE: 20 BRPM | HEART RATE: 93 BPM | SYSTOLIC BLOOD PRESSURE: 124 MMHG

## 2024-05-09 DIAGNOSIS — D57.00 SICKLE CELL PAIN CRISIS (H): Primary | ICD-10-CM

## 2024-05-09 DIAGNOSIS — G81.10 SPASTIC HEMIPLEGIA, UNSPECIFIED ETIOLOGY, UNSPECIFIED LATERALITY (H): ICD-10-CM

## 2024-05-09 PROCEDURE — 258N000003 HC RX IP 258 OP 636: Performed by: PEDIATRICS

## 2024-05-09 PROCEDURE — 250N000013 HC RX MED GY IP 250 OP 250 PS 637: Performed by: PEDIATRICS

## 2024-05-09 PROCEDURE — 96376 TX/PRO/DX INJ SAME DRUG ADON: CPT

## 2024-05-09 PROCEDURE — 250N000011 HC RX IP 250 OP 636: Performed by: PEDIATRICS

## 2024-05-09 PROCEDURE — 96361 HYDRATE IV INFUSION ADD-ON: CPT

## 2024-05-09 PROCEDURE — 96374 THER/PROPH/DIAG INJ IV PUSH: CPT

## 2024-05-09 PROCEDURE — 96375 TX/PRO/DX INJ NEW DRUG ADDON: CPT

## 2024-05-09 RX ORDER — ONDANSETRON 2 MG/ML
8 INJECTION INTRAMUSCULAR; INTRAVENOUS EVERY 6 HOURS PRN
Status: CANCELLED
Start: 2024-07-01

## 2024-05-09 RX ORDER — HEPARIN SODIUM,PORCINE 10 UNIT/ML
5 VIAL (ML) INTRAVENOUS
Status: CANCELLED | OUTPATIENT
Start: 2024-07-01

## 2024-05-09 RX ORDER — DIPHENHYDRAMINE HCL 25 MG
50 CAPSULE ORAL
Status: CANCELLED
Start: 2024-07-01

## 2024-05-09 RX ORDER — KETOROLAC TROMETHAMINE 30 MG/ML
30 INJECTION, SOLUTION INTRAMUSCULAR; INTRAVENOUS ONCE
Status: COMPLETED | OUTPATIENT
Start: 2024-05-09 | End: 2024-05-09

## 2024-05-09 RX ORDER — ONDANSETRON 2 MG/ML
8 INJECTION INTRAMUSCULAR; INTRAVENOUS EVERY 6 HOURS PRN
Status: DISCONTINUED | OUTPATIENT
Start: 2024-05-09 | End: 2024-05-09 | Stop reason: HOSPADM

## 2024-05-09 RX ORDER — HEPARIN SODIUM (PORCINE) LOCK FLUSH IV SOLN 100 UNIT/ML 100 UNIT/ML
5 SOLUTION INTRAVENOUS
Status: DISCONTINUED | OUTPATIENT
Start: 2024-05-09 | End: 2024-05-09 | Stop reason: HOSPADM

## 2024-05-09 RX ORDER — DIPHENHYDRAMINE HCL 25 MG
50 CAPSULE ORAL
Status: COMPLETED | OUTPATIENT
Start: 2024-05-09 | End: 2024-05-09

## 2024-05-09 RX ORDER — KETOROLAC TROMETHAMINE 30 MG/ML
30 INJECTION, SOLUTION INTRAMUSCULAR; INTRAVENOUS ONCE
Status: CANCELLED
Start: 2024-07-01 | End: 2024-07-01

## 2024-05-09 RX ORDER — HEPARIN SODIUM (PORCINE) LOCK FLUSH IV SOLN 100 UNIT/ML 100 UNIT/ML
5 SOLUTION INTRAVENOUS
Status: CANCELLED | OUTPATIENT
Start: 2024-07-01

## 2024-05-09 RX ORDER — ONDANSETRON 4 MG/1
8 TABLET, FILM COATED ORAL
Status: CANCELLED
Start: 2024-07-01

## 2024-05-09 RX ADMIN — SODIUM CHLORIDE, POTASSIUM CHLORIDE, SODIUM LACTATE AND CALCIUM CHLORIDE 1000 ML: 600; 310; 30; 20 INJECTION, SOLUTION INTRAVENOUS at 09:16

## 2024-05-09 RX ADMIN — HYDROMORPHONE HYDROCHLORIDE 1 MG: 1 INJECTION, SOLUTION INTRAMUSCULAR; INTRAVENOUS; SUBCUTANEOUS at 10:15

## 2024-05-09 RX ADMIN — DIPHENHYDRAMINE HYDROCHLORIDE 50 MG: 25 CAPSULE ORAL at 09:17

## 2024-05-09 RX ADMIN — HYDROMORPHONE HYDROCHLORIDE 1 MG: 1 INJECTION, SOLUTION INTRAMUSCULAR; INTRAVENOUS; SUBCUTANEOUS at 09:17

## 2024-05-09 RX ADMIN — HYDROMORPHONE HYDROCHLORIDE 1 MG: 1 INJECTION, SOLUTION INTRAMUSCULAR; INTRAVENOUS; SUBCUTANEOUS at 11:16

## 2024-05-09 RX ADMIN — KETOROLAC TROMETHAMINE 30 MG: 30 INJECTION, SOLUTION INTRAMUSCULAR; INTRAVENOUS at 09:17

## 2024-05-09 RX ADMIN — ONDANSETRON 8 MG: 2 INJECTION INTRAMUSCULAR; INTRAVENOUS at 09:17

## 2024-05-09 ASSESSMENT — PAIN SCALES - GENERAL: PAINLEVEL: WORST PAIN (10)

## 2024-05-09 NOTE — TELEPHONE ENCOUNTER
Oncology Nurse Triage - Sickle Cell Pain Crisis:    Situation: Jennifer  calling about Sickle Cell Pain Crisis, requesting to be added on for IV fluids and pain medicine    Background:     Patient's last infusion was 05/08/24  Last clinic visit date:5/3/2024  Does patient have active treatment plan?  Yes      Assessment of Symptoms:  Onset/Duration of symptoms: 2 day    Is it typical sickle cell pain? Yes   Location: sides and lower back  Character: Sharp           Intensity: 9/10    Any radiation of pain, numbness, tingling, weakness, warmth, swelling, discoloration of arms or legs?No     Fever?No  (if yes max temperature recorded in last 24 hours):      Chest Pain Present: No     Shortness of breath: No     Other home therapies tried:  Heating pads, warm showers, warm blanket      Last home medication taken and when: 0640 oxycodone, ibuprofen and muscle relaxant.   Any Refills Needed?: No     Does patient have transportation & length of time to get to clinic: No       Pt stating pain regimen is not sufficient for pain. She wants to avoid going to ED and wants to know what else she can do.      Recommendations:   Secure message to Patricia Mantilla for approval to add to wait list.    7623 Patricia approving adding pt to wait list for IVF/pain meds.    0166 Call to pt to inform her she will be added to wait list. Informed her if she does not hear from the infusion center by 2pm then she will not be able to get in for an infusion today. If symptoms worsen while waiting for call back, and/or you experience fever, chills, SOB, chest pain, cough, n/v, dizziness, numbness, swelling, discoloration of extremities, then seek emergency evaluation in Emergency Department.     Please note, if you are late for your appt, you risk losing your infusion appt as it may delay another patient's infusion who arrived on time.

## 2024-05-09 NOTE — TELEPHONE ENCOUNTER
Caller: Jennifer Cervantes   Reason for Reschedule/Cancellation (please be detailed, any staff messages or encounters to note?):     Per pt -- same day cancel       Prior to reschedule please review:  Ordering Provider:    MALAIKA SILVERIO      Sedation Determined: mac   Does patient have any ASC Exclusions, please identify?: n       Notes on Cancelled Procedure:  Procedure:Upper Endoscopy [EGD]   Date: 05/09/2024  Location:[facility]; [address] [facility], [address] [place] [address]  Surgeon: Say         Rescheduled: Yes   Procedure: Upper Endoscopy [EGD]  Date: 11/07/2024  Location: [facility]; [address] [facility], [address] [place] [address]   Surgeon: Say   Sedation Level Scheduled  MAC          Reason for Sedation Level per order   Prep/Instructions updated and sent: lorene     Does patient need PAC or Pre -Op Rescheduled? : n        Send In - basket message to Panc - Abhi Pool if EUS procedure is canceled or rescheduled: [ N/A, YES or NO] n/a

## 2024-05-09 NOTE — PROGRESS NOTES
Infusion Nursing Note:  Jennifer Cervantes presents today for IVF and pain meds.    Patient seen by provider today: No   present during visit today: Not Applicable.    Note: Jennifer here today feeling okay, having 10/10 generalized pain but also reporting feeling really discouraged and disappointed as she was not able to find someone to bring her to her scheduled EGD this morning and it had to be canceled. She is feeling frustrated by many of the people in her life and feels like there are not many people who are there for her right now. RN provided reassurance and validated patient's feelings. Jennifer received 1L LR bolus over 2hrs, 30mg IV toradol, 50mg PO benadryl, 8mg IV zofran, and 1mg x3 doses IV dilaudid. Pt tolerated infusion well, pain was improved on discharge.      Intravenous Access:  Implanted Port.    Treatment Conditions:  Not Applicable.      Post Infusion Assessment:  Patient tolerated infusion without incident.  Blood return noted pre and post infusion.  Site patent and intact, free from redness, edema or discomfort.  No evidence of extravasations.  Access discontinued per protocol.       Discharge Plan:   Patient discharged in stable condition accompanied by: self.  Departure Mode: Ambulatory.      Allison Bowman RN

## 2024-05-09 NOTE — TELEPHONE ENCOUNTER
Call to pt to inform her available appt with BMT at 0900. Pt stating she is able to find own transportation and will call back if having any problems but would like to take appt. [facility] notified to add pt to schedule.

## 2024-05-13 ENCOUNTER — PATIENT OUTREACH (OUTPATIENT)
Dept: CARE COORDINATION | Facility: CLINIC | Age: 24
End: 2024-05-13
Payer: COMMERCIAL

## 2024-05-13 ENCOUNTER — NURSE TRIAGE (OUTPATIENT)
Dept: ONCOLOGY | Facility: CLINIC | Age: 24
End: 2024-05-13
Payer: COMMERCIAL

## 2024-05-13 ENCOUNTER — INFUSION THERAPY VISIT (OUTPATIENT)
Dept: TRANSPLANT | Facility: CLINIC | Age: 24
End: 2024-05-13
Attending: PEDIATRICS
Payer: COMMERCIAL

## 2024-05-13 VITALS
DIASTOLIC BLOOD PRESSURE: 81 MMHG | OXYGEN SATURATION: 100 % | HEART RATE: 86 BPM | SYSTOLIC BLOOD PRESSURE: 116 MMHG | RESPIRATION RATE: 16 BRPM | TEMPERATURE: 97.9 F

## 2024-05-13 DIAGNOSIS — G81.10 SPASTIC HEMIPLEGIA, UNSPECIFIED ETIOLOGY, UNSPECIFIED LATERALITY (H): ICD-10-CM

## 2024-05-13 DIAGNOSIS — D57.00 SICKLE CELL PAIN CRISIS (H): ICD-10-CM

## 2024-05-13 DIAGNOSIS — D57.00 SICKLE CELL PAIN CRISIS (H): Primary | ICD-10-CM

## 2024-05-13 PROCEDURE — 250N000011 HC RX IP 250 OP 636: Performed by: PEDIATRICS

## 2024-05-13 PROCEDURE — 96374 THER/PROPH/DIAG INJ IV PUSH: CPT

## 2024-05-13 PROCEDURE — 96361 HYDRATE IV INFUSION ADD-ON: CPT

## 2024-05-13 PROCEDURE — 250N000013 HC RX MED GY IP 250 OP 250 PS 637: Performed by: PEDIATRICS

## 2024-05-13 PROCEDURE — 96376 TX/PRO/DX INJ SAME DRUG ADON: CPT

## 2024-05-13 PROCEDURE — 96375 TX/PRO/DX INJ NEW DRUG ADDON: CPT

## 2024-05-13 PROCEDURE — 258N000003 HC RX IP 258 OP 636: Performed by: PEDIATRICS

## 2024-05-13 RX ORDER — DIPHENHYDRAMINE HCL 25 MG
50 CAPSULE ORAL
Status: CANCELLED
Start: 2024-07-01

## 2024-05-13 RX ORDER — KETOROLAC TROMETHAMINE 30 MG/ML
30 INJECTION, SOLUTION INTRAMUSCULAR; INTRAVENOUS ONCE
Status: CANCELLED
Start: 2024-07-01 | End: 2024-07-01

## 2024-05-13 RX ORDER — HEPARIN SODIUM (PORCINE) LOCK FLUSH IV SOLN 100 UNIT/ML 100 UNIT/ML
5 SOLUTION INTRAVENOUS
Status: CANCELLED | OUTPATIENT
Start: 2024-07-01

## 2024-05-13 RX ORDER — HEPARIN SODIUM (PORCINE) LOCK FLUSH IV SOLN 100 UNIT/ML 100 UNIT/ML
5 SOLUTION INTRAVENOUS
Status: DISCONTINUED | OUTPATIENT
Start: 2024-05-13 | End: 2024-05-13 | Stop reason: HOSPADM

## 2024-05-13 RX ORDER — ONDANSETRON 2 MG/ML
8 INJECTION INTRAMUSCULAR; INTRAVENOUS EVERY 6 HOURS PRN
Status: CANCELLED
Start: 2024-07-01

## 2024-05-13 RX ORDER — ONDANSETRON 4 MG/1
8 TABLET, FILM COATED ORAL
Status: CANCELLED
Start: 2024-07-01

## 2024-05-13 RX ORDER — DIPHENHYDRAMINE HCL 25 MG
50 CAPSULE ORAL
Status: COMPLETED | OUTPATIENT
Start: 2024-05-13 | End: 2024-05-13

## 2024-05-13 RX ORDER — ONDANSETRON 2 MG/ML
8 INJECTION INTRAMUSCULAR; INTRAVENOUS EVERY 6 HOURS PRN
Status: DISCONTINUED | OUTPATIENT
Start: 2024-05-13 | End: 2024-05-13 | Stop reason: HOSPADM

## 2024-05-13 RX ORDER — HEPARIN SODIUM,PORCINE 10 UNIT/ML
5 VIAL (ML) INTRAVENOUS
Status: CANCELLED | OUTPATIENT
Start: 2024-07-01

## 2024-05-13 RX ORDER — OXYCODONE HYDROCHLORIDE 10 MG/1
10 TABLET ORAL EVERY 4 HOURS PRN
Qty: 20 TABLET | Refills: 0 | Status: ON HOLD | OUTPATIENT
Start: 2024-05-13 | End: 2024-05-20

## 2024-05-13 RX ORDER — KETOROLAC TROMETHAMINE 30 MG/ML
30 INJECTION, SOLUTION INTRAMUSCULAR; INTRAVENOUS ONCE
Status: COMPLETED | OUTPATIENT
Start: 2024-05-13 | End: 2024-05-13

## 2024-05-13 RX ADMIN — HYDROMORPHONE HYDROCHLORIDE 1 MG: 1 INJECTION, SOLUTION INTRAMUSCULAR; INTRAVENOUS; SUBCUTANEOUS at 13:12

## 2024-05-13 RX ADMIN — DIPHENHYDRAMINE HYDROCHLORIDE 50 MG: 25 CAPSULE ORAL at 11:06

## 2024-05-13 RX ADMIN — HYDROMORPHONE HYDROCHLORIDE 1 MG: 1 INJECTION, SOLUTION INTRAMUSCULAR; INTRAVENOUS; SUBCUTANEOUS at 11:06

## 2024-05-13 RX ADMIN — HYDROMORPHONE HYDROCHLORIDE 1 MG: 1 INJECTION, SOLUTION INTRAMUSCULAR; INTRAVENOUS; SUBCUTANEOUS at 12:14

## 2024-05-13 RX ADMIN — KETOROLAC TROMETHAMINE 30 MG: 30 INJECTION, SOLUTION INTRAMUSCULAR; INTRAVENOUS at 11:06

## 2024-05-13 RX ADMIN — SODIUM CHLORIDE, POTASSIUM CHLORIDE, SODIUM LACTATE AND CALCIUM CHLORIDE 1000 ML: 600; 310; 30; 20 INJECTION, SOLUTION INTRAVENOUS at 11:09

## 2024-05-13 RX ADMIN — Medication 5 ML: at 14:10

## 2024-05-13 RX ADMIN — ONDANSETRON 8 MG: 2 INJECTION INTRAMUSCULAR; INTRAVENOUS at 12:14

## 2024-05-13 ASSESSMENT — PAIN SCALES - GENERAL: PAINLEVEL: EXTREME PAIN (8)

## 2024-05-13 NOTE — TELEPHONE ENCOUNTER
Oncology Nurse Triage - Sickle Cell Pain Crisis:    Situation: Jennifer  calling about Sickle Cell Pain Crisis, requesting to be added on for IV fluids and pain medicine    Background:     Patient's last infusion was 5/9/24  Last clinic visit date:5/3/24 Patricia Mantilla   Does patient have active treatment plan?  Yes      Assessment of Symptoms:  Onset/Duration of symptoms: 3 day    Is it typical sickle cell pain? Yes   Location: generalized today   Character: Sharp           Intensity: 8/10    Any radiation of pain, numbness, tingling, weakness, warmth, swelling, discoloration of arms or legs?No     Fever?No  (if yes max temperature recorded in last 24 hours):      Chest Pain Present: No     Shortness of breath: No     Other home therapies tried: HEAT/HEATING PAD and WARM BATH     Last home medication taken and when: ibuprofen and oxycodone at 6am     Any Refills Needed?: Yes     Does patient have transportation & length of time to get to clinic: No needs help with transportation         Recommendations:     Placed on infusion call list     If you do not hear from the infusion center by 2pm then you will not be able to get in for an infusion today. If symptoms worsen while waiting for call back, and/or you experience fever, chills, SOB, chest pain, cough, n/v, dizziness, numbness, swelling, discoloration of extremities, then seek emergency evaluation in Emergency Department.     Please note, if you are late for your appt, you risk losing your infusion appt as it may delay another patient's infusion who arrived on time.

## 2024-05-13 NOTE — PROGRESS NOTES
Infusion Nursing Note:  Jennifer Cervantes presents today for IVF and pain meds.    Patient seen by provider today: No   present during visit today: Not Applicable.    Note: No labs drawn. Pt received 1L LR for IVF, x3 doses of IV Dilaudid, IV Toradol, PO Benadryl and IV Zofran. All VSS. Pain rated 8/10 upon admission and 5/10 upon discharge.    Intravenous Access:  Implanted Port.    Treatment Conditions:  Lab Results   Component Value Date    HGB 7.7 (L) 04/27/2024    WBC 8.2 04/27/2024    ANEU 9.0 (H) 03/17/2024    ANEUTAUTO 4.1 04/24/2024     04/29/2024        Lab Results   Component Value Date     04/27/2024    POTASSIUM 3.4 04/29/2024    MAG 1.7 04/29/2024    CR 0.47 (L) 04/29/2024    MICAH 8.8 04/27/2024    BILITOTAL 3.6 (H) 04/23/2024    ALBUMIN 4.2 04/23/2024    ALT 21 04/23/2024    AST 55 (H) 04/23/2024       Results reviewed, labs MET treatment parameters, ok to proceed with treatment.      Post Infusion Assessment:  Patient tolerated infusion without incident.  Blood return noted pre and post infusion.       Discharge Plan:   Patient and/or family verbalized understanding of discharge instructions and all questions answered.  Patient discharged in stable condition accompanied by: self.      Radha Galindo RN

## 2024-05-13 NOTE — TELEPHONE ENCOUNTER
Narcotic Refill Request    Medication(s) requested:  oxycodone   Person Requesting Refill:Jennifer  What pain is the medication treating: sickle cell pain   How is the medication being taken?:takes every 6 hours sometimes every 4   Does pt have enough for today? All out today   Is pain being adequately controlled on the current regimen?: yes   Experiencing any side effects from medication?: none     Date of most recent appointment:  5/3/24 Patricia mantilla   Any No Show Visits: no   Next appointment:   5/17/24 Patricia mantilla   Last fill date and by whom:  5/6/24 Patricia Mantilla    Reviewed:  No access     Send to provider:  Patricia Mantilla and Arely Krueger

## 2024-05-13 NOTE — PROGRESS NOTES
Social Work - Transportation  [facility]    Data/Intervention:  Patient Name: Jennifer Cervantes Goes By: Jennifer    /Age: 2000 (24 year old)    Referral From: [facility]  Reason for Referral:  support requested for patient transportation needs for today's appointment.  Assessment:  called [employer] to arrange ride through patient's insurance. [employer] arranged  for patient from home with [employer]. Patient will need to call [phone] when ready for return ride home.  Plan: Patient is aware of the transportation plan.  available to assist with any other needs.    CARLOS Chavez,LGUDAY  Hematology/Oncology Social Worker  Phone:[phone] Pager: [phone]

## 2024-05-13 NOTE — TELEPHONE ENCOUNTER
BMT can take at 11:00am     Call placed to Jennifer and she said she can make it to that appointment     Message sent to  to arrange transportation     Message sent to [facility] to schedule.

## 2024-05-15 ENCOUNTER — NURSE TRIAGE (OUTPATIENT)
Dept: ONCOLOGY | Facility: CLINIC | Age: 24
End: 2024-05-15
Payer: COMMERCIAL

## 2024-05-15 ENCOUNTER — INFUSION THERAPY VISIT (OUTPATIENT)
Dept: TRANSPLANT | Facility: CLINIC | Age: 24
End: 2024-05-15
Attending: PEDIATRICS
Payer: COMMERCIAL

## 2024-05-15 VITALS
OXYGEN SATURATION: 95 % | SYSTOLIC BLOOD PRESSURE: 122 MMHG | HEART RATE: 94 BPM | RESPIRATION RATE: 18 BRPM | DIASTOLIC BLOOD PRESSURE: 78 MMHG | TEMPERATURE: 97.6 F

## 2024-05-15 DIAGNOSIS — G81.10 SPASTIC HEMIPLEGIA, UNSPECIFIED ETIOLOGY, UNSPECIFIED LATERALITY (H): ICD-10-CM

## 2024-05-15 DIAGNOSIS — D57.00 SICKLE CELL PAIN CRISIS (H): Primary | ICD-10-CM

## 2024-05-15 PROCEDURE — 250N000011 HC RX IP 250 OP 636: Performed by: PEDIATRICS

## 2024-05-15 PROCEDURE — 96376 TX/PRO/DX INJ SAME DRUG ADON: CPT

## 2024-05-15 PROCEDURE — 96361 HYDRATE IV INFUSION ADD-ON: CPT

## 2024-05-15 PROCEDURE — 96375 TX/PRO/DX INJ NEW DRUG ADDON: CPT

## 2024-05-15 PROCEDURE — 96374 THER/PROPH/DIAG INJ IV PUSH: CPT

## 2024-05-15 PROCEDURE — 250N000013 HC RX MED GY IP 250 OP 250 PS 637: Performed by: PEDIATRICS

## 2024-05-15 PROCEDURE — 258N000003 HC RX IP 258 OP 636: Performed by: PEDIATRICS

## 2024-05-15 RX ORDER — DIPHENHYDRAMINE HCL 25 MG
50 CAPSULE ORAL
Status: CANCELLED
Start: 2024-07-01

## 2024-05-15 RX ORDER — ONDANSETRON 4 MG/1
8 TABLET, FILM COATED ORAL
Status: CANCELLED
Start: 2024-07-01

## 2024-05-15 RX ORDER — HEPARIN SODIUM (PORCINE) LOCK FLUSH IV SOLN 100 UNIT/ML 100 UNIT/ML
5 SOLUTION INTRAVENOUS
Status: DISCONTINUED | OUTPATIENT
Start: 2024-05-15 | End: 2024-05-15 | Stop reason: HOSPADM

## 2024-05-15 RX ORDER — ONDANSETRON 2 MG/ML
8 INJECTION INTRAMUSCULAR; INTRAVENOUS EVERY 6 HOURS PRN
Status: CANCELLED
Start: 2024-07-01

## 2024-05-15 RX ORDER — HEPARIN SODIUM (PORCINE) LOCK FLUSH IV SOLN 100 UNIT/ML 100 UNIT/ML
5 SOLUTION INTRAVENOUS
Status: CANCELLED | OUTPATIENT
Start: 2024-07-01

## 2024-05-15 RX ORDER — PREDNISONE 20 MG/1
20 TABLET ORAL 2 TIMES DAILY
COMMUNITY
End: 2024-05-16

## 2024-05-15 RX ORDER — ONDANSETRON 2 MG/ML
8 INJECTION INTRAMUSCULAR; INTRAVENOUS EVERY 6 HOURS PRN
Status: DISCONTINUED | OUTPATIENT
Start: 2024-05-15 | End: 2024-05-15 | Stop reason: HOSPADM

## 2024-05-15 RX ORDER — HEPARIN SODIUM,PORCINE 10 UNIT/ML
5 VIAL (ML) INTRAVENOUS
Status: CANCELLED | OUTPATIENT
Start: 2024-07-01

## 2024-05-15 RX ORDER — KETOROLAC TROMETHAMINE 30 MG/ML
30 INJECTION, SOLUTION INTRAMUSCULAR; INTRAVENOUS ONCE
Status: COMPLETED | OUTPATIENT
Start: 2024-05-15 | End: 2024-05-15

## 2024-05-15 RX ORDER — DIPHENHYDRAMINE HCL 25 MG
50 CAPSULE ORAL
Status: COMPLETED | OUTPATIENT
Start: 2024-05-15 | End: 2024-05-15

## 2024-05-15 RX ORDER — KETOROLAC TROMETHAMINE 30 MG/ML
30 INJECTION, SOLUTION INTRAMUSCULAR; INTRAVENOUS ONCE
Status: CANCELLED
Start: 2024-07-01 | End: 2024-07-01

## 2024-05-15 RX ADMIN — HYDROMORPHONE HYDROCHLORIDE 1 MG: 1 INJECTION, SOLUTION INTRAMUSCULAR; INTRAVENOUS; SUBCUTANEOUS at 11:13

## 2024-05-15 RX ADMIN — ONDANSETRON 8 MG: 2 INJECTION INTRAMUSCULAR; INTRAVENOUS at 11:14

## 2024-05-15 RX ADMIN — SODIUM CHLORIDE, POTASSIUM CHLORIDE, SODIUM LACTATE AND CALCIUM CHLORIDE 1000 ML: 600; 310; 30; 20 INJECTION, SOLUTION INTRAVENOUS at 11:11

## 2024-05-15 RX ADMIN — KETOROLAC TROMETHAMINE 30 MG: 30 INJECTION, SOLUTION INTRAMUSCULAR; INTRAVENOUS at 11:12

## 2024-05-15 RX ADMIN — HYDROMORPHONE HYDROCHLORIDE 1 MG: 1 INJECTION, SOLUTION INTRAMUSCULAR; INTRAVENOUS; SUBCUTANEOUS at 12:20

## 2024-05-15 RX ADMIN — Medication 5 ML: at 13:25

## 2024-05-15 RX ADMIN — DIPHENHYDRAMINE HYDROCHLORIDE 50 MG: 25 CAPSULE ORAL at 11:15

## 2024-05-15 RX ADMIN — HYDROMORPHONE HYDROCHLORIDE 1 MG: 1 INJECTION, SOLUTION INTRAMUSCULAR; INTRAVENOUS; SUBCUTANEOUS at 13:22

## 2024-05-15 NOTE — PROGRESS NOTES
Infusion Nursing Note:  Jennifer Cervantes presents today for IVF and pain meds.    Patient seen by provider today: No   present during visit today: Not Applicable.    Note: Jennifer here today for add on IVF and pain meds. She reports some respiratory symptoms which have restarted today- SpO2 95% on room air. She is managing symptoms at home with nebs q4-6hrs. She understands when to go to hospital. Secure chat sent to Patricia Mantilla, pt should reduce prednisone to only 1 dose tomorrow, relayed to patient who understands. Jennifer received the following medications in infusion today:    Administrations This Visit       diphenhydrAMINE (BENADRYL) capsule 50 mg       Admin Date  05/15/2024 Action  $Given Dose  50 mg Route  Oral Documented By  Allison Bowman RN              heparin 100 unit/mL injection 5 mL       Admin Date  05/15/2024 Action  $Given Dose  5 mL Route  Intracatheter Documented By  Allison Bowman RN              HYDROmorphone (DILAUDID) injection 1 mg       Admin Date  05/15/2024 Action  $Given Dose  1 mg Route  Intravenous Documented By  Allison Bowman RN               Admin Date  05/15/2024 Action  $Given Dose  1 mg Route  Intravenous Documented By  Allison Wiggins RN               Admin Date  05/15/2024 Action  $Given Dose  1 mg Route  Intravenous Documented By  Allison Bowman RN              ketorolac (TORADOL) injection 30 mg       Admin Date  05/15/2024 Action  $Given Dose  30 mg Route  Intravenous Documented By  Allison Bowman, JOE              lactated ringers BOLUS 1,000 mL       Admin Date  05/15/2024 Action  $New Bag Dose  1,000 mL Rate  500 mL/hr Route  Intravenous Documented By  Allison Bowman, JOE              ondansetron (ZOFRAN) injection 8 mg       Admin Date  05/15/2024 Action  $Given Dose  8 mg Route  Intravenous Documented By  Allison Bowman RN                 .  Pain decreased to manageable level at end of infusion.    Intravenous Access:  Implanted Port.    Treatment Conditions:  Not  Applicable.      Post Infusion Assessment:  Patient tolerated infusion without incident.  Blood return noted pre and post infusion.  Site patent and intact, free from redness, edema or discomfort.  No evidence of extravasations.  Access discontinued per protocol.       Discharge Plan:   Patient discharged in stable condition accompanied by: self.  Departure Mode: Ambulatory.      Allison Bowman RN

## 2024-05-15 NOTE — TELEPHONE ENCOUNTER
Oncology Nurse Triage - Sickle Cell Pain Crisis:  Situation: Jennifer  calling about Sickle Cell Pain Crisis, requesting to be added on for IV fluids and pain medicine    Background:   Patient's last infusion was 5/13/24  Last clinic visit date:5/3/24 with Patricia Mantilla CNP  Does patient have active treatment plan?  Yes    Assessment of Symptoms:  Onset/Duration of symptoms: 1 day--since last night. Trying to wait until Friday when has sidney so she could add on    Is it typical sickle cell pain? Yes   Location: generalized  Character: Sharp           Intensity: 9/10    Any radiation of pain, numbness, tingling, weakness, warmth, swelling, discoloration of arms or legs?No     Fever?No  Chest Pain Present: No   Shortness of breath: No     Other home therapies tried:    Last home medication taken and when: 0600 took, oxy and ibuprofen    Any Refills Needed?: No     Does patient have transportation & length of time to get to clinic: No 15 minutes away from clinic.    Recommendations:   If you do not hear from the infusion center by 2pm then you will not be able to get in for an infusion today. If symptoms worsen while waiting for call back, and/or you experience fever, chills, SOB, chest pain, cough, n/v, dizziness, numbness, swelling, discoloration of extremities, then seek emergency evaluation in Emergency Department.     Added to infusion wait list.    0914: BMT can offer apt for Jennifer at 1100.  0915: Called Jennifer and she confirmed she can come to the clinic for infusion at that time.  Message sent to [facility] to ask them to schedule apt.  Message sent to SW to ask them to contact pt to assist with ride to and from clinic apt.  Updated infusion charge nurse.

## 2024-05-16 ENCOUNTER — HOSPITAL ENCOUNTER (INPATIENT)
Facility: CLINIC | Age: 24
LOS: 4 days | Discharge: HOME | DRG: 812 | End: 2024-05-20
Attending: EMERGENCY MEDICINE | Admitting: INTERNAL MEDICINE
Payer: COMMERCIAL

## 2024-05-16 ENCOUNTER — NURSE TRIAGE (OUTPATIENT)
Dept: ONCOLOGY | Facility: CLINIC | Age: 24
End: 2024-05-16
Payer: COMMERCIAL

## 2024-05-16 ENCOUNTER — APPOINTMENT (OUTPATIENT)
Dept: ULTRASOUND IMAGING | Facility: CLINIC | Age: 24
DRG: 812 | End: 2024-05-16
Attending: PHYSICIAN ASSISTANT
Payer: COMMERCIAL

## 2024-05-16 ENCOUNTER — APPOINTMENT (OUTPATIENT)
Dept: CT IMAGING | Facility: CLINIC | Age: 24
DRG: 812 | End: 2024-05-16
Attending: PHYSICIAN ASSISTANT
Payer: COMMERCIAL

## 2024-05-16 DIAGNOSIS — R10.9 ABDOMINAL DISCOMFORT: ICD-10-CM

## 2024-05-16 DIAGNOSIS — D57.00 SICKLE CELL DISEASE WITH CRISIS (H): ICD-10-CM

## 2024-05-16 LAB
ABO/RH(D): NORMAL
ALBUMIN SERPL BCG-MCNC: 4.2 G/DL (ref 3.5–5.2)
ALBUMIN UR-MCNC: NEGATIVE MG/DL
ALP SERPL-CCNC: 53 U/L (ref 40–150)
ALT SERPL W P-5'-P-CCNC: 21 U/L (ref 0–50)
ANION GAP SERPL CALCULATED.3IONS-SCNC: 7 MMOL/L (ref 7–15)
ANTIBODY SCREEN: NEGATIVE
APPEARANCE UR: CLEAR
APTT PPP: 26 SECONDS (ref 22–38)
AST SERPL W P-5'-P-CCNC: 34 U/L (ref 0–45)
BASOPHILS # BLD AUTO: 0.2 10E3/UL (ref 0–0.2)
BASOPHILS NFR BLD AUTO: 1 %
BILIRUB SERPL-MCNC: 2.3 MG/DL
BILIRUB UR QL STRIP: NEGATIVE
BLD PROD TYP BPU: NORMAL
BLOOD COMPONENT TYPE: NORMAL
BUN SERPL-MCNC: 10.9 MG/DL (ref 6–20)
CALCIUM SERPL-MCNC: 8.6 MG/DL (ref 8.6–10)
CHLORIDE SERPL-SCNC: 106 MMOL/L (ref 98–107)
CODING SYSTEM: NORMAL
COLOR UR AUTO: ABNORMAL
CREAT SERPL-MCNC: 0.56 MG/DL (ref 0.51–0.95)
CROSSMATCH: NORMAL
DEPRECATED HCO3 PLAS-SCNC: 25 MMOL/L (ref 22–29)
EGFRCR SERPLBLD CKD-EPI 2021: >90 ML/MIN/1.73M2
EOSINOPHIL # BLD AUTO: 0.4 10E3/UL (ref 0–0.7)
EOSINOPHIL NFR BLD AUTO: 2 %
ERYTHROCYTE [DISTWIDTH] IN BLOOD BY AUTOMATED COUNT: 21.2 % (ref 10–15)
GLUCOSE SERPL-MCNC: 86 MG/DL (ref 70–99)
GLUCOSE UR STRIP-MCNC: NEGATIVE MG/DL
HCG UR QL: NEGATIVE
HCT VFR BLD AUTO: 18.9 % (ref 35–47)
HGB BLD-MCNC: 6.6 G/DL (ref 11.7–15.7)
HGB UR QL STRIP: ABNORMAL
HOLD SPECIMEN: NORMAL
IMM GRANULOCYTES # BLD: 0.1 10E3/UL
IMM GRANULOCYTES NFR BLD: 1 %
INR PPP: 1.29 (ref 0.85–1.15)
ISSUE DATE AND TIME: NORMAL
KETONES UR STRIP-MCNC: NEGATIVE MG/DL
LACTATE SERPL-SCNC: 0.6 MMOL/L (ref 0.7–2)
LEUKOCYTE ESTERASE UR QL STRIP: NEGATIVE
LIPASE SERPL-CCNC: 32 U/L (ref 13–60)
LYMPHOCYTES # BLD AUTO: 2.2 10E3/UL (ref 0.8–5.3)
LYMPHOCYTES NFR BLD AUTO: 13 %
MAGNESIUM SERPL-MCNC: 2 MG/DL (ref 1.7–2.3)
MCH RBC QN AUTO: 30.7 PG (ref 26.5–33)
MCHC RBC AUTO-ENTMCNC: 34.9 G/DL (ref 31.5–36.5)
MCV RBC AUTO: 88 FL (ref 78–100)
MONOCYTES # BLD AUTO: 1.2 10E3/UL (ref 0–1.3)
MONOCYTES NFR BLD AUTO: 7 %
MUCOUS THREADS #/AREA URNS LPF: PRESENT /LPF
NEUTROPHILS # BLD AUTO: 12.6 10E3/UL (ref 1.6–8.3)
NEUTROPHILS NFR BLD AUTO: 76 %
NITRATE UR QL: NEGATIVE
NRBC # BLD AUTO: 0.1 10E3/UL
NRBC BLD AUTO-RTO: 1 /100
PH UR STRIP: 6 [PH] (ref 5–7)
PLATELET # BLD AUTO: 476 10E3/UL (ref 150–450)
POTASSIUM SERPL-SCNC: 4.2 MMOL/L (ref 3.4–5.3)
PROT SERPL-MCNC: 6.6 G/DL (ref 6.4–8.3)
RBC # BLD AUTO: 2.15 10E6/UL (ref 3.8–5.2)
RBC URINE: 1 /HPF
SODIUM SERPL-SCNC: 138 MMOL/L (ref 135–145)
SP GR UR STRIP: 1.01 (ref 1–1.03)
SPECIMEN EXPIRATION DATE: NORMAL
SQUAMOUS EPITHELIAL: <1 /HPF
UNIT ABO/RH: NORMAL
UNIT NUMBER: NORMAL
UNIT STATUS: NORMAL
UNIT TYPE ISBT: 5100
UROBILINOGEN UR STRIP-MCNC: NORMAL MG/DL
WBC # BLD AUTO: 16.6 10E3/UL (ref 4–11)
WBC URINE: 3 /HPF

## 2024-05-16 PROCEDURE — P9016 RBC LEUKOCYTES REDUCED: HCPCS | Performed by: EMERGENCY MEDICINE

## 2024-05-16 PROCEDURE — 96376 TX/PRO/DX INJ SAME DRUG ADON: CPT | Performed by: EMERGENCY MEDICINE

## 2024-05-16 PROCEDURE — 86900 BLOOD TYPING SEROLOGIC ABO: CPT | Performed by: EMERGENCY MEDICINE

## 2024-05-16 PROCEDURE — 250N000011 HC RX IP 250 OP 636: Performed by: PHYSICIAN ASSISTANT

## 2024-05-16 PROCEDURE — 96361 HYDRATE IV INFUSION ADD-ON: CPT | Performed by: EMERGENCY MEDICINE

## 2024-05-16 PROCEDURE — 85610 PROTHROMBIN TIME: CPT | Performed by: EMERGENCY MEDICINE

## 2024-05-16 PROCEDURE — 99291 CRITICAL CARE FIRST HOUR: CPT | Performed by: EMERGENCY MEDICINE

## 2024-05-16 PROCEDURE — 120N000002 HC R&B MED SURG/OB UMMC

## 2024-05-16 PROCEDURE — 258N000003 HC RX IP 258 OP 636: Performed by: PHYSICIAN ASSISTANT

## 2024-05-16 PROCEDURE — 86923 COMPATIBILITY TEST ELECTRIC: CPT | Performed by: EMERGENCY MEDICINE

## 2024-05-16 PROCEDURE — 83605 ASSAY OF LACTIC ACID: CPT | Performed by: EMERGENCY MEDICINE

## 2024-05-16 PROCEDURE — 74176 CT ABD & PELVIS W/O CONTRAST: CPT | Mod: 26 | Performed by: RADIOLOGY

## 2024-05-16 PROCEDURE — 250N000011 HC RX IP 250 OP 636: Performed by: EMERGENCY MEDICINE

## 2024-05-16 PROCEDURE — 85730 THROMBOPLASTIN TIME PARTIAL: CPT | Performed by: EMERGENCY MEDICINE

## 2024-05-16 PROCEDURE — 99223 1ST HOSP IP/OBS HIGH 75: CPT | Performed by: PHYSICIAN ASSISTANT

## 2024-05-16 PROCEDURE — 83690 ASSAY OF LIPASE: CPT | Performed by: EMERGENCY MEDICINE

## 2024-05-16 PROCEDURE — 96375 TX/PRO/DX INJ NEW DRUG ADDON: CPT | Performed by: EMERGENCY MEDICINE

## 2024-05-16 PROCEDURE — 85025 COMPLETE CBC W/AUTO DIFF WBC: CPT | Performed by: EMERGENCY MEDICINE

## 2024-05-16 PROCEDURE — 82040 ASSAY OF SERUM ALBUMIN: CPT | Performed by: EMERGENCY MEDICINE

## 2024-05-16 PROCEDURE — 76705 ECHO EXAM OF ABDOMEN: CPT | Mod: 26 | Performed by: RADIOLOGY

## 2024-05-16 PROCEDURE — 93975 VASCULAR STUDY: CPT

## 2024-05-16 PROCEDURE — 96374 THER/PROPH/DIAG INJ IV PUSH: CPT | Performed by: EMERGENCY MEDICINE

## 2024-05-16 PROCEDURE — 36415 COLL VENOUS BLD VENIPUNCTURE: CPT | Performed by: EMERGENCY MEDICINE

## 2024-05-16 PROCEDURE — 74176 CT ABD & PELVIS W/O CONTRAST: CPT

## 2024-05-16 PROCEDURE — 81001 URINALYSIS AUTO W/SCOPE: CPT | Performed by: PHYSICIAN ASSISTANT

## 2024-05-16 PROCEDURE — 93975 VASCULAR STUDY: CPT | Mod: 26 | Performed by: RADIOLOGY

## 2024-05-16 PROCEDURE — 76705 ECHO EXAM OF ABDOMEN: CPT

## 2024-05-16 PROCEDURE — 258N000003 HC RX IP 258 OP 636: Performed by: EMERGENCY MEDICINE

## 2024-05-16 PROCEDURE — 83735 ASSAY OF MAGNESIUM: CPT | Performed by: EMERGENCY MEDICINE

## 2024-05-16 PROCEDURE — 99291 CRITICAL CARE FIRST HOUR: CPT | Mod: 25 | Performed by: EMERGENCY MEDICINE

## 2024-05-16 PROCEDURE — 81025 URINE PREGNANCY TEST: CPT | Performed by: PHYSICIAN ASSISTANT

## 2024-05-16 RX ORDER — ALBUTEROL SULFATE 90 UG/1
2 AEROSOL, METERED RESPIRATORY (INHALATION) EVERY 6 HOURS PRN
Status: DISCONTINUED | OUTPATIENT
Start: 2024-05-16 | End: 2024-05-20 | Stop reason: HOSPADM

## 2024-05-16 RX ORDER — AMOXICILLIN 250 MG
2 CAPSULE ORAL 2 TIMES DAILY PRN
Status: DISCONTINUED | OUTPATIENT
Start: 2024-05-16 | End: 2024-05-20 | Stop reason: HOSPADM

## 2024-05-16 RX ORDER — PANTOPRAZOLE SODIUM 40 MG/1
40 TABLET, DELAYED RELEASE ORAL
Status: DISCONTINUED | OUTPATIENT
Start: 2024-05-17 | End: 2024-05-16

## 2024-05-16 RX ORDER — ONDANSETRON 4 MG/1
8 TABLET, ORALLY DISINTEGRATING ORAL EVERY 8 HOURS PRN
Status: DISCONTINUED | OUTPATIENT
Start: 2024-05-16 | End: 2024-05-20 | Stop reason: HOSPADM

## 2024-05-16 RX ORDER — ACETAMINOPHEN 325 MG/1
975 TABLET ORAL EVERY 8 HOURS
Status: DISCONTINUED | OUTPATIENT
Start: 2024-05-16 | End: 2024-05-20 | Stop reason: HOSPADM

## 2024-05-16 RX ORDER — DEFERASIROX 360 MG/1
1440 TABLET, FILM COATED ORAL EVERY EVENING
Status: DISCONTINUED | OUTPATIENT
Start: 2024-05-16 | End: 2024-05-20 | Stop reason: HOSPADM

## 2024-05-16 RX ORDER — SENNOSIDES 8.6 MG
8.6 TABLET ORAL 2 TIMES DAILY
Status: DISCONTINUED | OUTPATIENT
Start: 2024-05-16 | End: 2024-05-20 | Stop reason: HOSPADM

## 2024-05-16 RX ORDER — LIDOCAINE 40 MG/G
CREAM TOPICAL
Status: DISCONTINUED | OUTPATIENT
Start: 2024-05-16 | End: 2024-05-20 | Stop reason: HOSPADM

## 2024-05-16 RX ORDER — PREDNISONE 20 MG/1
20 TABLET ORAL 2 TIMES DAILY
Status: DISCONTINUED | OUTPATIENT
Start: 2024-05-16 | End: 2024-05-16

## 2024-05-16 RX ORDER — ASPIRIN 81 MG/1
81 TABLET, CHEWABLE ORAL 2 TIMES DAILY
Status: DISCONTINUED | OUTPATIENT
Start: 2024-05-16 | End: 2024-05-20 | Stop reason: HOSPADM

## 2024-05-16 RX ORDER — AMOXICILLIN 250 MG
1 CAPSULE ORAL 2 TIMES DAILY PRN
Status: DISCONTINUED | OUTPATIENT
Start: 2024-05-16 | End: 2024-05-20 | Stop reason: HOSPADM

## 2024-05-16 RX ORDER — ONDANSETRON 2 MG/ML
8 INJECTION INTRAMUSCULAR; INTRAVENOUS EVERY 8 HOURS PRN
Status: DISCONTINUED | OUTPATIENT
Start: 2024-05-16 | End: 2024-05-20 | Stop reason: HOSPADM

## 2024-05-16 RX ORDER — KETOROLAC TROMETHAMINE 15 MG/ML
10 INJECTION, SOLUTION INTRAMUSCULAR; INTRAVENOUS ONCE
Status: COMPLETED | OUTPATIENT
Start: 2024-05-16 | End: 2024-05-16

## 2024-05-16 RX ORDER — KETOROLAC TROMETHAMINE 30 MG/ML
30 INJECTION, SOLUTION INTRAMUSCULAR; INTRAVENOUS EVERY 6 HOURS PRN
Status: DISCONTINUED | OUTPATIENT
Start: 2024-05-16 | End: 2024-05-20 | Stop reason: HOSPADM

## 2024-05-16 RX ORDER — ALBUTEROL SULFATE 0.83 MG/ML
5 SOLUTION RESPIRATORY (INHALATION) EVERY 6 HOURS PRN
Status: DISCONTINUED | OUTPATIENT
Start: 2024-05-16 | End: 2024-05-20 | Stop reason: HOSPADM

## 2024-05-16 RX ORDER — SODIUM CHLORIDE, SODIUM LACTATE, POTASSIUM CHLORIDE, CALCIUM CHLORIDE 600; 310; 30; 20 MG/100ML; MG/100ML; MG/100ML; MG/100ML
INJECTION, SOLUTION INTRAVENOUS CONTINUOUS
Status: DISPENSED | OUTPATIENT
Start: 2024-05-16 | End: 2024-05-18

## 2024-05-16 RX ORDER — ONDANSETRON 8 MG/1
8 TABLET, FILM COATED ORAL EVERY 8 HOURS PRN
Status: DISCONTINUED | OUTPATIENT
Start: 2024-05-16 | End: 2024-05-16

## 2024-05-16 RX ORDER — CETIRIZINE HYDROCHLORIDE 10 MG/1
10 TABLET ORAL DAILY
Status: DISCONTINUED | OUTPATIENT
Start: 2024-05-16 | End: 2024-05-16

## 2024-05-16 RX ORDER — FLUTICASONE FUROATE AND VILANTEROL 200; 25 UG/1; UG/1
1 POWDER RESPIRATORY (INHALATION) DAILY
Status: DISCONTINUED | OUTPATIENT
Start: 2024-05-16 | End: 2024-05-20 | Stop reason: HOSPADM

## 2024-05-16 RX ORDER — DIPHENHYDRAMINE HCL 25 MG
25 CAPSULE ORAL EVERY 6 HOURS PRN
Status: DISCONTINUED | OUTPATIENT
Start: 2024-05-16 | End: 2024-05-16

## 2024-05-16 RX ORDER — IBUPROFEN 800 MG/1
800 TABLET, FILM COATED ORAL EVERY 8 HOURS PRN
COMMUNITY

## 2024-05-16 RX ORDER — CALCIUM CARBONATE 500 MG/1
1000 TABLET, CHEWABLE ORAL 4 TIMES DAILY PRN
Status: DISCONTINUED | OUTPATIENT
Start: 2024-05-16 | End: 2024-05-20 | Stop reason: HOSPADM

## 2024-05-16 RX ORDER — DOCUSATE SODIUM 100 MG/1
100 CAPSULE, LIQUID FILLED ORAL 2 TIMES DAILY
Status: DISCONTINUED | OUTPATIENT
Start: 2024-05-16 | End: 2024-05-20 | Stop reason: HOSPADM

## 2024-05-16 RX ORDER — METHOCARBAMOL 750 MG/1
750 TABLET, FILM COATED ORAL 4 TIMES DAILY PRN
Status: DISCONTINUED | OUTPATIENT
Start: 2024-05-16 | End: 2024-05-20 | Stop reason: HOSPADM

## 2024-05-16 RX ADMIN — KETOROLAC TROMETHAMINE 10 MG: 15 INJECTION, SOLUTION INTRAMUSCULAR; INTRAVENOUS at 12:29

## 2024-05-16 RX ADMIN — SODIUM CHLORIDE, POTASSIUM CHLORIDE, SODIUM LACTATE AND CALCIUM CHLORIDE 1000 ML: 600; 310; 30; 20 INJECTION, SOLUTION INTRAVENOUS at 12:30

## 2024-05-16 RX ADMIN — HYDROMORPHONE HYDROCHLORIDE 1 MG: 1 INJECTION, SOLUTION INTRAMUSCULAR; INTRAVENOUS; SUBCUTANEOUS at 12:56

## 2024-05-16 RX ADMIN — HYDROMORPHONE HYDROCHLORIDE 1 MG: 1 INJECTION, SOLUTION INTRAMUSCULAR; INTRAVENOUS; SUBCUTANEOUS at 12:29

## 2024-05-16 RX ADMIN — HYDROMORPHONE HYDROCHLORIDE 1 MG: 1 INJECTION, SOLUTION INTRAMUSCULAR; INTRAVENOUS; SUBCUTANEOUS at 23:21

## 2024-05-16 RX ADMIN — HYDROMORPHONE HYDROCHLORIDE 1 MG: 1 INJECTION, SOLUTION INTRAMUSCULAR; INTRAVENOUS; SUBCUTANEOUS at 14:19

## 2024-05-16 RX ADMIN — KETOROLAC TROMETHAMINE 30 MG: 30 INJECTION, SOLUTION INTRAMUSCULAR at 20:50

## 2024-05-16 RX ADMIN — SODIUM CHLORIDE, POTASSIUM CHLORIDE, SODIUM LACTATE AND CALCIUM CHLORIDE: 600; 310; 30; 20 INJECTION, SOLUTION INTRAVENOUS at 20:51

## 2024-05-16 RX ADMIN — ONDANSETRON 8 MG: 2 INJECTION INTRAMUSCULAR; INTRAVENOUS at 21:17

## 2024-05-16 RX ADMIN — HYDROMORPHONE HYDROCHLORIDE 1 MG: 1 INJECTION, SOLUTION INTRAMUSCULAR; INTRAVENOUS; SUBCUTANEOUS at 16:31

## 2024-05-16 RX ADMIN — HYDROMORPHONE HYDROCHLORIDE 1 MG: 1 INJECTION, SOLUTION INTRAMUSCULAR; INTRAVENOUS; SUBCUTANEOUS at 19:02

## 2024-05-16 RX ADMIN — HYDROMORPHONE HYDROCHLORIDE 1 MG: 1 INJECTION, SOLUTION INTRAMUSCULAR; INTRAVENOUS; SUBCUTANEOUS at 21:18

## 2024-05-16 ASSESSMENT — ACTIVITIES OF DAILY LIVING (ADL)
ADLS_ACUITY_SCORE: 40
ADLS_ACUITY_SCORE: 38
ADLS_ACUITY_SCORE: 40
ADLS_ACUITY_SCORE: 38
ADLS_ACUITY_SCORE: 40
ADLS_ACUITY_SCORE: 38
ADLS_ACUITY_SCORE: 22
ADLS_ACUITY_SCORE: 40
ADLS_ACUITY_SCORE: 38
ADLS_ACUITY_SCORE: 40
ADLS_ACUITY_SCORE: 22
ADLS_ACUITY_SCORE: 40

## 2024-05-16 ASSESSMENT — COLUMBIA-SUICIDE SEVERITY RATING SCALE - C-SSRS
1. IN THE PAST MONTH, HAVE YOU WISHED YOU WERE DEAD OR WISHED YOU COULD GO TO SLEEP AND NOT WAKE UP?: NO
2. HAVE YOU ACTUALLY HAD ANY THOUGHTS OF KILLING YOURSELF IN THE PAST MONTH?: NO
6. HAVE YOU EVER DONE ANYTHING, STARTED TO DO ANYTHING, OR PREPARED TO DO ANYTHING TO END YOUR LIFE?: NO

## 2024-05-16 NOTE — MEDICATION SCRIBE - ADMISSION MEDICATION HISTORY
Medication Scribe Admission Medication History    Admission medication history is complete. The information provided in this note is only as accurate as the sources available at the time of the update.    Information Source(s): Patient and CareEverywhere/SureScripts via in-person    Pertinent Information: Patient repored taking 20mg prednisone today and was told by her provider to discontinue it after last dose today.    Changes made to PTA medication list:  Added: None  Deleted: prednisone, cetirizine, benadryl  Changed: None    Allergies reviewed with patient and updates made in EHR: yes    Medication History Completed By: Brenda Trejo 5/16/2024 4:14 PM    PTA Med List   Medication Sig Last Dose    aspirin (ASA) 81 MG chewable tablet Take 1 tablet (81 mg) by mouth 2 times daily 5/16/2024    deferasirox (JADENU) 360 MG tablet Take 4 tablets (1,440 mg) by mouth every evening 5/16/2024    Hydroxyurea 1000 MG TABS Take 3,000 mg by mouth daily 5/16/2024    ibuprofen (ADVIL/MOTRIN) 800 MG tablet Take 800 mg by mouth every 8 hours as needed for moderate pain 5/16/2024    methocarbamol (ROBAXIN) 750 MG tablet Take 1 tablet (750 mg) by mouth 4 times daily as needed for muscle spasms (during sickle pain crises. Okay to take scheduled while in pain) More than a month    omeprazole (PRILOSEC) 20 MG DR capsule Take 1 capsule (20 mg) by mouth daily 5/16/2024    ondansetron (ZOFRAN) 8 MG tablet Take 1 tablet (8 mg) by mouth every 8 hours as needed for nausea 5/16/2024    oxyCODONE IR (ROXICODONE) 10 MG tablet Take 1 tablet (10 mg) by mouth every 4 hours as needed for severe pain or breakthrough pain Goal 4 per day. Max 6 per day. 5/16/2024

## 2024-05-16 NOTE — ED PROVIDER NOTES
[facility] EMERGENCY DEPARTMENT ([facility])  5/16/24  ED 06      History     Chief Complaint   Patient presents with    Sickle Cell Pain Crisis     Woke up with left side hurting.  10/10 pain.  Took home meds and ineffective.     HPI  Jennfier Cervantes is a 24 year old female with sickle cell anemia, history of stroke, past acute chest syndrome and intermittent asthma now presents with 12 hours of bilateral flank and hip discomfort similar to past sickle cell crises. She also reports crampy abdominal discomfort. Denies nausea, headache, chest pain, cough, upper respiratory symptoms or urinary frequency/dysuria.    This part of the medical record was transcribed by Jacob BURDICK, from a dictation done by Venancio Nava MD.       Physical Exam   BP: 131/83  Pulse: 91  Temp: 98.1  F (36.7  C)  Resp: 16  SpO2: 95 %  Physical Exam  Patient appears mildly uncomfortable. Breathing comfortably, speaking full sentences. Baseline right upper extremity contracted. Lungs clear without wheezes. Heart regular rate and rhythm. Abdomen soft, non-tender and non-distended.     This part of the medical record was transcribed by Jacob BURDICK, from a dictation done by Venancio Nava MD.     ED Course, Procedures, & Data      Procedures              Results for orders placed or performed during the hospital encounter of 05/16/24   INR     Status: Abnormal   Result Value Ref Range    INR 1.29 (H) 0.85 - 1.15   Comprehensive metabolic panel     Status: Abnormal   Result Value Ref Range    Sodium 138 135 - 145 mmol/L    Potassium 4.2 3.4 - 5.3 mmol/L    Carbon Dioxide (CO2) 25 22 - 29 mmol/L    Anion Gap 7 7 - 15 mmol/L    Urea Nitrogen 10.9 6.0 - 20.0 mg/dL    Creatinine 0.56 0.51 - 0.95 mg/dL    GFR Estimate >90 >60 mL/min/1.73m2    Calcium 8.6 8.6 - 10.0 mg/dL    Chloride 106 98 - 107 mmol/L    Glucose 86 70 - 99 mg/dL    Alkaline Phosphatase 53 40 - 150 U/L    AST 34 0 - 45 U/L    ALT 21 0 - 50 U/L     Protein Total 6.6 6.4 - 8.3 g/dL    Albumin 4.2 3.5 - 5.2 g/dL    Bilirubin Total 2.3 (H) <=1.2 mg/dL   Lipase     Status: Normal   Result Value Ref Range    Lipase 32 13 - 60 U/L   Lactic acid whole blood with 1x repeat in 2 hr when >2     Status: Abnormal   Result Value Ref Range    Lactic Acid, Initial 0.6 (L) 0.7 - 2.0 mmol/L   Magnesium     Status: Normal   Result Value Ref Range    Magnesium 2.0 1.7 - 2.3 mg/dL   CBC with platelets and differential     Status: Abnormal   Result Value Ref Range    WBC Count 16.6 (H) 4.0 - 11.0 10e3/uL    RBC Count 2.15 (L) 3.80 - 5.20 10e6/uL    Hemoglobin 6.6 (LL) 11.7 - 15.7 g/dL    Hematocrit 18.9 (L) 35.0 - 47.0 %    MCV 88 78 - 100 fL    MCH 30.7 26.5 - 33.0 pg    MCHC 34.9 31.5 - 36.5 g/dL    RDW 21.2 (H) 10.0 - 15.0 %    Platelet Count 476 (H) 150 - 450 10e3/uL    % Neutrophils 76 %    % Lymphocytes 13 %    % Monocytes 7 %    % Eosinophils 2 %    % Basophils 1 %    % Immature Granulocytes 1 %    NRBCs per 100 WBC 1 (H) <1 /100    Absolute Neutrophils 12.6 (H) 1.6 - 8.3 10e3/uL    Absolute Lymphocytes 2.2 0.8 - 5.3 10e3/uL    Absolute Monocytes 1.2 0.0 - 1.3 10e3/uL    Absolute Eosinophils 0.4 0.0 - 0.7 10e3/uL    Absolute Basophils 0.2 0.0 - 0.2 10e3/uL    Absolute Immature Granulocytes 0.1 <=0.4 10e3/uL    Absolute NRBCs 0.1 10e3/uL   CBC with platelets differential     Status: Abnormal    Narrative    The following orders were created for panel order CBC with platelets differential.  Procedure                               Abnormality         Status                     ---------                               -----------         ------                     CBC with platelets and d...[[id number]]  Abnormal            Final result                 Please view results for these tests on the individual orders.     Medications   HYDROmorphone (DILAUDID) injection 1 mg (has no administration in time range)   lactated ringers BOLUS 1,000 mL (1,000 mLs Intravenous $New Bag  5/16/24 1230)   ketorolac (TORADOL) injection 10 mg (10 mg Intravenous $Given 5/16/24 1229)   HYDROmorphone (DILAUDID) injection 1 mg (1 mg Intravenous $Given 5/16/24 1229)   HYDROmorphone (DILAUDID) injection 1 mg (1 mg Intravenous $Given 5/16/24 1256)     Labs Ordered and Resulted from Time of ED Arrival to Time of ED Departure   INR - Abnormal       Result Value    INR 1.29 (*)    COMPREHENSIVE METABOLIC PANEL - Abnormal    Sodium 138      Potassium 4.2      Carbon Dioxide (CO2) 25      Anion Gap 7      Urea Nitrogen 10.9      Creatinine 0.56      GFR Estimate >90      Calcium 8.6      Chloride 106      Glucose 86      Alkaline Phosphatase 53      AST 34      ALT 21      Protein Total 6.6      Albumin 4.2      Bilirubin Total 2.3 (*)    LACTIC ACID WHOLE BLOOD WITH 1X REPEAT IN 2 HR WHEN >2 - Abnormal    Lactic Acid, Initial 0.6 (*)    CBC WITH PLATELETS AND DIFFERENTIAL - Abnormal    WBC Count 16.6 (*)     RBC Count 2.15 (*)     Hemoglobin 6.6 (*)     Hematocrit 18.9 (*)     MCV 88      MCH 30.7      MCHC 34.9      RDW 21.2 (*)     Platelet Count 476 (*)     % Neutrophils 76      % Lymphocytes 13      % Monocytes 7      % Eosinophils 2      % Basophils 1      % Immature Granulocytes 1      NRBCs per 100 WBC 1 (*)     Absolute Neutrophils 12.6 (*)     Absolute Lymphocytes 2.2      Absolute Monocytes 1.2      Absolute Eosinophils 0.4      Absolute Basophils 0.2      Absolute Immature Granulocytes 0.1      Absolute NRBCs 0.1     LIPASE - Normal    Lipase 32     MAGNESIUM - Normal    Magnesium 2.0     PARTIAL THROMBOPLASTIN TIME   HCG QUALITATIVE URINE   ROUTINE UA WITH MICROSCOPIC REFLEX TO CULTURE   PREPARE RED BLOOD CELLS (UNIT)     CT Abdomen Pelvis w/o Contrast    (Results Pending)   US Abdomen Limited w Abdomen Doppler Limited    (Results Pending)          Critical Care Addendum    My initial assessment, based on my review of nursing observations, review of vital signs, focused history, physical exam, and  discussion with hematology attending md , established that Jennifer Cervantes has  severe sickle cell pain crisis with transfusion-dependent anemia , which requires immediate intervention, and therefore She is critically ill.     After the initial assessment, the care team initiated multiple lab tests, initiated IV fluid administration, initiated medication therapy with iv dilaudid/toradol, and consulted with hematology  to provide stabilization care. Due to the critical nature of this patient, I reassessed nursing observations, vital signs, physical exam, and respiratory status multiple times prior to She disposition.     Time also spent performing documentation, reviewing test results, discussion with consultants, and coordination of care.     Critical care time (excluding teaching time and procedures): 35 minutes.       Medical Decision Making      Assessment & Plan    Abdominal discomfort. Differential diagnosis gastroenteritis, intra-abdominal infection. Will rule out pregnancy & electrolyte abnormality. Labs including lipase, lactate, sickle cell pain crisis. I reviewed the snapshot ED plan and will implement IV fluids, IV Dilaudid and IV Toradol. Reassess pain for subsequent doses as needed and decide on advanced imaging and ultimate disposition.    1230pm: Initial dose of pain medications given.  Initial delay was supply related to get resources to access patient's port.  IV fluids running and labs pending    2pm: I discussed the anemia under 7 with patient's hematologist Dr. JAS Duncan, who agrees with blood transfusion of sickle negative RBCs.  Given abdominal pain he also recommends an evaluation for portal vein thrombosis.  Given that patient has IV contrast allergy and abdominal pain, I will perform a CT abdomen pelvis without IV contrast but also perform an ultrasound of the abdomen specifically to evaluate for portal vein thrombosis.  Given her pain and anemia which is transfusion dependent, will  admit    Paper blood consent performed.  Endorsed to medicine hospitalist triage for admission to [facility].      This part of the medical record was transcribed by PILO PATTON Medical Scribe, from a dictation done by Venancio Nava MD.     I have reviewed the nursing notes. I have reviewed the findings, diagnosis, plan and need for follow up with the patient.    New Prescriptions    No medications on file       Final diagnoses:   Sickle cell disease with crisis (H)   Abdominal discomfort       Venancio Nava MD  [facility] EMERGENCY DEPARTMENT  5/16/2024        Venancio Nava MD  05/16/24 1240       Venancio Nava MD  05/16/24 1404       Venancio Nava MD  05/16/24 1418       Venancio Nava MD  05/17/24 0556

## 2024-05-16 NOTE — TELEPHONE ENCOUNTER
"Pt calling regarding pain. Pt tearful during call. She reports pain \"was so bad could barely walk.\" She took ibuprofen and tyelnol and now is having severe abd pain. States located in all of her abd, rates 10/10 and describes as sharp. Other symptom she is having is a persistent cough.   Denies F/C, SOB, chest pain, N/V, bleeding.     Writer advised ED if pain is severe.     Pt requesting to speak to Patricia Mantilla or .    Informed pt writer would reach out to Patricia Mantilla. Advised pt to report to ED if sx continue to progress while she is waiting for call back.    Secure message sent to Patricia Mantilla    08 Patricia recommending pt report to ED.     0856 Call to pt to inform her of provider recommendation. Pt verbalized understanding and states she does have someone who can take her to ED.    Call to [facility] and provided report to nurse.      "

## 2024-05-16 NOTE — H&P
[facility]    History and Physical - Hospitalist Service, GOLD TEAM        Date of Admission:  5/16/2024    Assessment & Plan      Jennifer Cervantes is a 24 year old female admitted on 5/16/2024. She has a history of sickle cell anemia, stroke leading to cognitive delays and right upper extremity hemiparesis, iron overload due to chronic transfusions, past acute chest syndrome anxiety, depression and moderate persistent asthma who presented to the ED with bilateral flank and hip pain similar to past sickle cell crisis.     Sickle cell anemia   Sickle cell pain crisis   Chronic pain   Primary hematologist is Dr. Duncan. Presented with bilateral flank and hip pain similar to prior sickle cell pain crisis. Also has abdominal pain as below. In the ED found to have hgb 6.6. WBC 16.6. CMP with elevated tbili otherwise unremarkable. LA 0.6. No hypoxia. Denies respiratory symptoms. After ED discussed with Dr. Duncan, recommended for 1 unit pRBC (started in the ED.)   -Consult hematology, appreciate assistance   -Transfuse 1 unit PRBC per ED  -Repeat CBC in AM   -Assess for portal vein thrombosis as below   -Pain management:    -Patient prefers IV dilaudid PRN doses rather than PCA (of note, PCA dilaudid dosing in care plan does not specify dose of dilaudid and Toradol dosing unclear; should be revisited by hematology team.)   -IV dilaudid 1 mg every 2 hours PRN   -Toradol 30 mg every 6 hours    -LR maintenance X 1-2 days  -Bowel regimen with docusate and senna   -Continue PTA hydrozyurea 3000 mg daily   -Methocarbamol 750 mg QID PRN   -Zofran 8 mg every 8 hour PRN  -Omeprazole 20 mg dialy   -Check UA  -CT and ultrasound abdomen as below    Hemochromatosis   Iron overload   Interval Interval FerriScan while on chelation 5/3/24 with marked hepatic hemachromatosis, iron concentrations which have increased since prior scan 9/2023.   -Continue Jadenu 1440 mg every evening    Abdominal  pain   Hx of cholecystectomy   Presented with cramping abdominal discomfort.  Patient reports longstanding history of intermittent abdominal pain, status postcholecystectomy.  Following cholecystectomy she had improvement in pain however intermittent abdominal pain developed again over the last several weeks.  She notes she was scheduled for an EGD however this was postponed and now scheduled for November 2024.  In the ED lipase wnl. Tbili elevated at 2.3. Given abdominal pain and sickle cell, Dr. Duncan recommended evaluation for portal vein thrombosis.   -US abdomin to assess for portal vein thrombosis   -NPO for abdominal US  -Pending CT and ultrasound findings, consider GI consultation    Hx of CVA   -ASA 81mg BID     Anxiety   Depression   Not currently on any medications.     Moderate persistent asthma  With recent possible asthma exacerbation requring hospital admission due to acute hypoxic respiratory failure. CT chest at that time (4/23/2024) with mild dependent atelectasis bilaterally with no other acute abnormalities.  Respiratory status improved with treatment of asthma exacerbation patient was on room air at time of discharge.  She was started on prednisone to complete 7-day course of 40 mg daily.  -Continue PTA albuterol PRN   -Continue albuterol nebs as needed  -Continue PTA Symbicort BID  -Continue cetirizine 10 mg daily             Diet: NPO per Anesthesia Guidelines for Procedure/Surgery Except for: Meds, Ice Chips    DVT Prophylaxis: Pneumatic Compression Devices  Lopez Catheter: Not present  Lines: PRESENT      Port A Cath Single 04/21/21 Left Chest wall-Site Assessment: WDL      Cardiac Monitoring: None  Code Status:  Full code    Clinically Significant Risk Factors Present on Admission               # Coagulation Defect: INR = 1.29 (Ref range: 0.85 - 1.15) and/or PTT = 26 Seconds (Ref range: 22 - 38 Seconds), will monitor for bleeding  # Drug Induced Platelet Defect: home medication list  includes an antiplatelet medication            # Financial/Environmental Concerns:    # Asthma: noted on problem list        Disposition Plan     Medically Ready for Discharge: Anticipated in 2-4 Days         The patient's care was discussed with the Attending Physician, Dr. Philippe and Patient.    RONALDO Mcfarland  Hospitalist Service, [facility]  Securely message with [employer] (more info)  Text page via [facility] Paging/Directory   See signed in provider for up to date coverage information    ______________________________________________________________________    Chief Complaint   Hip and abdominal pain    History is obtained from the patient and review of medical records.    History of Present Illness   Jennifer Cervantes is a 24 year old female who has a history of sickle cell anemia, stroke leading to cognitive delays and right upper extremity hemiparesis, iron overload due to chronic transfusions, past acute chest syndrome, anxiety, depression and moderate persistent asthma who presented to the ED with bilateral flank and hip pain similar to past sickle cell crisis.  Patient reports yesterday she was feeling well.  However, this morning she awoke with severe bilateral hip pain extending to lateral aspects of abdomen.  She also notes generalized abdominal discomfort with shooting abdominal pain.  Patient feels hip and flank pain is similar to prior sickle cell pain crises.  She notes history of intermittent abdominal pain for which she underwent cholecystectomy with improvement of pain.  However, over the last several weeks, abdominal pain has reoccurred.  She notes intermittent vomiting with or without abdominal pain.  Reports no vomiting today.  She was able to eat 2 pieces of pizza.  She denies any cough, shortness of breath, fevers, chills, chest pain, dyspnea.  She does report history of acute chest as well as moderate persistent asthma.  She was recently  hospitalized for suspected asthma exacerbation with acute hypoxic respiratory failure.  At that time she was discharged with 7-day course of prednisone.      Past Medical History    Past Medical History:   Diagnosis Date    Anxiety     Bleeding disorder (H24)     Blood clotting disorder (H24)     Cerebral infarction (H) 2015    Cognitive developmental delay     low IQ. 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 side (H)     right hand contractures    Iron overload due to repeated red blood cell transfusions     Migraines     Multiple subsegmental pulmonary emboli without acute cor pulmonale (H) 02/01/2021    Oppositional defiant behavior     Presence of intrauterine contraceptive device 2/18/2020    Superficial venous thrombosis of arm, right 03/25/2021    Uncomplicated asthma        Past Surgical History   Past Surgical History:   Procedure Laterality Date    AS INSERT TUNNELED CV 2 CATH W/O PORT/PUMP      CHOLECYSTECTOMY      CV RIGHT HEART CATH MEASUREMENTS RECORDED N/A 11/18/2021    Procedure: Right Heart Cath;  Surgeon: Jackson Stauffer MD;  Location:  HEART CARDIAC CATH LAB    INSERT PORT VASCULAR ACCESS Left 4/21/2021    Procedure: INSERTION, VASCULAR ACCESS PORT (NOT SURE ON SIDE UNTIL REMOVAL);  Surgeon: Rajan More MD;  Location: [facility] OR    IR CHEST PORT PLACEMENT > 5 YRS OF AGE  4/21/2021    IR CVC NON TUNNEL LINE REMOVAL  5/6/2021    IR CVC NON TUNNEL PLACEMENT > 5 YRS  04/07/2020    IR CVC NON TUNNEL PLACEMENT > 5 YRS  4/30/2021    IR CVC NON TUNNEL PLACEMENT > 5 YRS  9/7/2022    IR PORT REMOVAL LEFT  4/21/2021    REMOVE PORT VASCULAR ACCESS Left 4/21/2021    Procedure: REMOVAL, VASCULAR ACCESS PORT LEFT;  Surgeon: Rajan More MD;  Location: [facility] OR    REPAIR TENDON ELBOW Right 10/02/2019    Procedure: Right Elbow Flexor Lengthening, Flexor Pronator Slide Of Wrist and  Finger, Thumb Adductor Lengthening;  Surgeon: Anai Franco MD;  Location: [facility] OR    TONSILLECTOMY Bilateral 10/02/2019    Procedure: Bilateral Tonsillectomy;  Surgeon: Farhana Guy MD;  Location: [facility] OR    Z BREAST REDUCTION (INCLUDES LIPO) TIER 3 Bilateral 04/23/2019       Prior to Admission Medications   Prior to Admission Medications   Prescriptions Last Dose Informant Patient Reported? Taking?   EPINEPHrine (ANY BX GENERIC EQUIV) 0.3 MG/0.3ML injection 2-pack  Self No No   Sig: Inject 0.3 mLs (0.3 mg) into the muscle as needed for anaphylaxis   Patient not taking: Reported on 5/15/2024   Hydroxyurea 1000 MG TABS   No No   Sig: Take 3,000 mg by mouth daily   acetaminophen (TYLENOL) 325 MG tablet   No No   Sig: Take 3 tablets (975 mg) by mouth every 8 hours   albuterol (PROAIR HFA/PROVENTIL HFA/VENTOLIN HFA) 108 (90 Base) MCG/ACT inhaler   No No   Sig: Inhale 2 puffs into the lungs every 6 hours as needed for shortness of breath or wheezing   albuterol (PROVENTIL) (2.5 MG/3ML) 0.083% neb solution   No No   Sig: Take 2 vials (5 mg) by nebulization every 6 hours as needed for shortness of breath or wheezing   aspirin (ASA) 81 MG chewable tablet 5/16/2024  No Yes   Sig: Take 1 tablet (81 mg) by mouth 2 times daily   budesonide-formoterol (SYMBICORT) 160-4.5 MCG/ACT Inhaler   No No   Sig: Inhale 2 puffs twice daily plus 1-2 puffs as needed. May use up to 12 puffs per day.   cetirizine (ZYRTEC) 10 MG tablet  Self No No   Sig: Take 1 tablet (10 mg) by mouth daily   deferasirox (JADENU) 360 MG tablet   No No   Sig: Take 4 tablets (1,440 mg) by mouth every evening   diphenhydrAMINE (BENADRYL) 25 MG capsule  Self No No   Sig: Take 1 capsule (25 mg) by mouth every 6 hours as needed for itching or allergies   ibuprofen (ADVIL/MOTRIN) 600 MG tablet   Yes No   Sig: Take 600 mg by mouth every 6 hours as needed for moderate pain Using rarely, 2x/week at most   ibuprofen (ADVIL/MOTRIN) 800 MG tablet  5/16/2024  Yes Yes   Sig: Take 800 mg by mouth every 8 hours as needed for moderate pain   melatonin 5 MG tablet   No No   Sig: Take 1 tablet (5 mg) by mouth nightly as needed for sleep   methocarbamol (ROBAXIN) 750 MG tablet   No No   Sig: Take 1 tablet (750 mg) by mouth 4 times daily as needed for muscle spasms (during sickle pain crises. Okay to take scheduled while in pain)   naloxone (NARCAN) 4 MG/0.1ML nasal spray  Self Yes No   Sig: Spray 4 mg into one nostril alternating nostrils as needed for opioid reversal every 2-3 minutes until assistance arrives   Patient not taking: Reported on 5/15/2024   naloxone (NARCAN) 4 MG/0.1ML nasal spray   Yes No   Sig: Spray 1 spray (4 mg) into one nostril alternating nostrils as needed for opioid reversal every 2-3 minutes until assistance arrives   Patient not taking: Reported on 5/15/2024   omeprazole (PRILOSEC) 20 MG DR capsule   No No   Sig: Take 1 capsule (20 mg) by mouth daily   ondansetron (ZOFRAN) 8 MG tablet   No No   Sig: Take 1 tablet (8 mg) by mouth every 8 hours as needed for nausea   oxyCODONE IR (ROXICODONE) 10 MG tablet 5/16/2024  No Yes   Sig: Take 1 tablet (10 mg) by mouth every 4 hours as needed for severe pain or breakthrough pain Goal 4 per day. Max 6 per day.   predniSONE (DELTASONE) 20 MG tablet   Yes No   Sig: Take 20 mg by mouth 2 times daily Taking per discharge doctor, has 2 days left as of 5/15 per pt      Facility-Administered Medications: None        Allergies   Allergies   Allergen Reactions    Contrast Dye      Hives and breathing issues    Fish-Derived Products Hives    Seafood Hives    Adhesive Tape Hives     Primipore dressing causes hives    Gadolinium     Iodinated Contrast Media         Physical Exam   Blood pressure 131/83, pulse 91, temperature 98.1  F (36.7  C), temperature source Oral, resp. rate 16, SpO2 95%, not currently breastfeeding.  GENERAL: Alert and oriented x 3. NAD. Closes eyes during interview, however continues to  listen.  HEENT: Anicteric sclera. EOMI. Mucous membranes moist and without lesions.   CV: RRR. S1, S2. No murmurs appreciated.   RESPIRATORY: Effort normal on RA. Lungs CTAB with no wheezing, rales, rhonchi.   GI: Abdomen soft and non distended, bowel sounds present. Mild tenderness to palpation of generalized abdomen. No rebound, guarding.   MUSCULOSKELETAL: No joint swelling or tenderness. Moves all extremities.  Baseline right upper extremity contraction.   NEUROLOGICAL: No focal deficits.  Sensation intact throughout  EXTREMITIES: No peripheral edema. Intact bilateral pedal pulses.   SKIN: No jaundice. No rashes of exposed skin.      Medical Decision Making       75 MINUTES SPENT BY ME on the date of service doing chart review, history, exam, documentation & further activities per the note.      Data     I have personally reviewed the following data over the past 24 hrs:    16.6 (H)  \   6.6 (LL)   / 476 (H)     138 106 10.9 /  86   4.2 25 0.56 \     ALT: 21 AST: 34 AP: 53 TBILI: 2.3 (H)   ALB: 4.2 TOT PROTEIN: 6.6 LIPASE: 32     Procal: N/A CRP: N/A Lactic Acid: 0.6 (L)       INR:  1.29 (H) PTT:  26   D-dimer:  N/A Fibrinogen:  N/A

## 2024-05-16 NOTE — ED TRIAGE NOTES
Has a port     Triage Assessment (Adult)       Row Name 05/16/24 1121          Triage Assessment    Airway WDL WDL        Respiratory WDL    Respiratory WDL WDL        Skin Circulation/Temperature WDL    Skin Circulation/Temperature WDL WDL        Cardiac WDL    Cardiac WDL WDL        Peripheral/Neurovascular WDL    Peripheral Neurovascular WDL WDL        Cognitive/Neuro/Behavioral WDL    Cognitive/Neuro/Behavioral WDL WDL

## 2024-05-17 LAB
ALBUMIN SERPL BCG-MCNC: 4.1 G/DL (ref 3.5–5.2)
ALP SERPL-CCNC: 54 U/L (ref 40–150)
ALT SERPL W P-5'-P-CCNC: 20 U/L (ref 0–50)
ANION GAP SERPL CALCULATED.3IONS-SCNC: 8 MMOL/L (ref 7–15)
AST SERPL W P-5'-P-CCNC: 36 U/L (ref 0–45)
BILIRUB SERPL-MCNC: 2.6 MG/DL
BUN SERPL-MCNC: 6.6 MG/DL (ref 6–20)
CALCIUM SERPL-MCNC: 8.3 MG/DL (ref 8.6–10)
CHLORIDE SERPL-SCNC: 107 MMOL/L (ref 98–107)
CREAT SERPL-MCNC: 0.57 MG/DL (ref 0.51–0.95)
DEPRECATED HCO3 PLAS-SCNC: 26 MMOL/L (ref 22–29)
EGFRCR SERPLBLD CKD-EPI 2021: >90 ML/MIN/1.73M2
ERYTHROCYTE [DISTWIDTH] IN BLOOD BY AUTOMATED COUNT: 20.3 % (ref 10–15)
GLUCOSE SERPL-MCNC: 86 MG/DL (ref 70–99)
HCT VFR BLD AUTO: 21.9 % (ref 35–47)
HGB BLD-MCNC: 7.4 G/DL (ref 11.7–15.7)
INR PPP: 1.21 (ref 0.85–1.15)
MCH RBC QN AUTO: 29.5 PG (ref 26.5–33)
MCHC RBC AUTO-ENTMCNC: 33.8 G/DL (ref 31.5–36.5)
MCV RBC AUTO: 87 FL (ref 78–100)
PLATELET # BLD AUTO: 406 10E3/UL (ref 150–450)
POTASSIUM SERPL-SCNC: 4.2 MMOL/L (ref 3.4–5.3)
PROT SERPL-MCNC: 6.5 G/DL (ref 6.4–8.3)
RBC # BLD AUTO: 2.51 10E6/UL (ref 3.8–5.2)
RETICS # AUTO: 0.52 10E6/UL (ref 0.03–0.1)
RETICS/RBC NFR AUTO: 20.1 % (ref 0.5–2)
SODIUM SERPL-SCNC: 141 MMOL/L (ref 135–145)
WBC # BLD AUTO: 11.6 10E3/UL (ref 4–11)

## 2024-05-17 PROCEDURE — 85027 COMPLETE CBC AUTOMATED: CPT | Performed by: PHYSICIAN ASSISTANT

## 2024-05-17 PROCEDURE — 82040 ASSAY OF SERUM ALBUMIN: CPT | Performed by: PHYSICIAN ASSISTANT

## 2024-05-17 PROCEDURE — 258N000003 HC RX IP 258 OP 636: Performed by: PHYSICIAN ASSISTANT

## 2024-05-17 PROCEDURE — 120N000002 HC R&B MED SURG/OB UMMC

## 2024-05-17 PROCEDURE — 99222 1ST HOSP IP/OBS MODERATE 55: CPT | Mod: GC | Performed by: INTERNAL MEDICINE

## 2024-05-17 PROCEDURE — 36591 DRAW BLOOD OFF VENOUS DEVICE: CPT | Performed by: PHYSICIAN ASSISTANT

## 2024-05-17 PROCEDURE — 250N000011 HC RX IP 250 OP 636: Performed by: PHYSICIAN ASSISTANT

## 2024-05-17 PROCEDURE — 85610 PROTHROMBIN TIME: CPT | Performed by: PHYSICIAN ASSISTANT

## 2024-05-17 PROCEDURE — 85045 AUTOMATED RETICULOCYTE COUNT: CPT | Performed by: PHYSICIAN ASSISTANT

## 2024-05-17 PROCEDURE — 99233 SBSQ HOSP IP/OBS HIGH 50: CPT | Performed by: INTERNAL MEDICINE

## 2024-05-17 PROCEDURE — 250N000013 HC RX MED GY IP 250 OP 250 PS 637: Performed by: PHYSICIAN ASSISTANT

## 2024-05-17 RX ORDER — NALOXONE HYDROCHLORIDE 0.4 MG/ML
0.4 INJECTION, SOLUTION INTRAMUSCULAR; INTRAVENOUS; SUBCUTANEOUS
Status: DISCONTINUED | OUTPATIENT
Start: 2024-05-17 | End: 2024-05-20 | Stop reason: HOSPADM

## 2024-05-17 RX ORDER — NALOXONE HYDROCHLORIDE 0.4 MG/ML
0.2 INJECTION, SOLUTION INTRAMUSCULAR; INTRAVENOUS; SUBCUTANEOUS
Status: DISCONTINUED | OUTPATIENT
Start: 2024-05-17 | End: 2024-05-20 | Stop reason: HOSPADM

## 2024-05-17 RX ADMIN — SODIUM CHLORIDE, POTASSIUM CHLORIDE, SODIUM LACTATE AND CALCIUM CHLORIDE: 600; 310; 30; 20 INJECTION, SOLUTION INTRAVENOUS at 09:32

## 2024-05-17 RX ADMIN — HYDROMORPHONE HYDROCHLORIDE 1 MG: 1 INJECTION, SOLUTION INTRAMUSCULAR; INTRAVENOUS; SUBCUTANEOUS at 10:12

## 2024-05-17 RX ADMIN — HYDROMORPHONE HYDROCHLORIDE 1 MG: 1 INJECTION, SOLUTION INTRAMUSCULAR; INTRAVENOUS; SUBCUTANEOUS at 21:03

## 2024-05-17 RX ADMIN — ASPIRIN 81 MG CHEWABLE TABLET 81 MG: 81 TABLET CHEWABLE at 08:08

## 2024-05-17 RX ADMIN — HYDROMORPHONE HYDROCHLORIDE 1 MG: 1 INJECTION, SOLUTION INTRAMUSCULAR; INTRAVENOUS; SUBCUTANEOUS at 16:15

## 2024-05-17 RX ADMIN — DOCUSATE SODIUM 100 MG: 100 CAPSULE, LIQUID FILLED ORAL at 08:08

## 2024-05-17 RX ADMIN — HYDROXYUREA 3000 MG: 300 CAPSULE ORAL at 09:12

## 2024-05-17 RX ADMIN — HYDROMORPHONE HYDROCHLORIDE 1 MG: 1 INJECTION, SOLUTION INTRAMUSCULAR; INTRAVENOUS; SUBCUTANEOUS at 18:20

## 2024-05-17 RX ADMIN — HYDROMORPHONE HYDROCHLORIDE 1 MG: 1 INJECTION, SOLUTION INTRAMUSCULAR; INTRAVENOUS; SUBCUTANEOUS at 08:08

## 2024-05-17 RX ADMIN — HYDROMORPHONE HYDROCHLORIDE 1 MG: 1 INJECTION, SOLUTION INTRAMUSCULAR; INTRAVENOUS; SUBCUTANEOUS at 03:43

## 2024-05-17 RX ADMIN — FLUTICASONE FUROATE AND VILANTEROL TRIFENATATE 1 PUFF: 200; 25 POWDER RESPIRATORY (INHALATION) at 09:29

## 2024-05-17 RX ADMIN — ACETAMINOPHEN 975 MG: 325 TABLET, FILM COATED ORAL at 08:08

## 2024-05-17 RX ADMIN — ACETAMINOPHEN 975 MG: 325 TABLET, FILM COATED ORAL at 16:15

## 2024-05-17 RX ADMIN — ONDANSETRON 8 MG: 2 INJECTION INTRAMUSCULAR; INTRAVENOUS at 14:14

## 2024-05-17 RX ADMIN — HYDROMORPHONE HYDROCHLORIDE 1 MG: 1 INJECTION, SOLUTION INTRAMUSCULAR; INTRAVENOUS; SUBCUTANEOUS at 12:05

## 2024-05-17 RX ADMIN — HYDROMORPHONE HYDROCHLORIDE 1 MG: 1 INJECTION, SOLUTION INTRAMUSCULAR; INTRAVENOUS; SUBCUTANEOUS at 01:39

## 2024-05-17 RX ADMIN — SENNOSIDES AND DOCUSATE SODIUM 1 TABLET: 50; 8.6 TABLET ORAL at 08:08

## 2024-05-17 RX ADMIN — HYDROMORPHONE HYDROCHLORIDE 1 MG: 1 INJECTION, SOLUTION INTRAMUSCULAR; INTRAVENOUS; SUBCUTANEOUS at 06:06

## 2024-05-17 RX ADMIN — HYDROMORPHONE HYDROCHLORIDE 1 MG: 1 INJECTION, SOLUTION INTRAMUSCULAR; INTRAVENOUS; SUBCUTANEOUS at 14:03

## 2024-05-17 RX ADMIN — SODIUM CHLORIDE, POTASSIUM CHLORIDE, SODIUM LACTATE AND CALCIUM CHLORIDE: 600; 310; 30; 20 INJECTION, SOLUTION INTRAVENOUS at 23:07

## 2024-05-17 RX ADMIN — HYDROMORPHONE HYDROCHLORIDE 1 MG: 1 INJECTION, SOLUTION INTRAMUSCULAR; INTRAVENOUS; SUBCUTANEOUS at 23:03

## 2024-05-17 RX ADMIN — ASPIRIN 81 MG CHEWABLE TABLET 81 MG: 81 TABLET CHEWABLE at 19:48

## 2024-05-17 ASSESSMENT — ACTIVITIES OF DAILY LIVING (ADL)
ADLS_ACUITY_SCORE: 21
ADLS_ACUITY_SCORE: 22
ADLS_ACUITY_SCORE: 21
ADLS_ACUITY_SCORE: 21
ADLS_ACUITY_SCORE: 22
ADLS_ACUITY_SCORE: 21
ADLS_ACUITY_SCORE: 22
ADLS_ACUITY_SCORE: 21
ADLS_ACUITY_SCORE: 22
ADLS_ACUITY_SCORE: 21
ADLS_ACUITY_SCORE: 22
ADLS_ACUITY_SCORE: 22
DEPENDENT_IADLS:: TRANSPORTATION;INDEPENDENT

## 2024-05-17 NOTE — CONSULTS
Care Management Initial Consult    General Information  Assessment completed with: Patient,    Type of CM/SW Visit: Initial Assessment    Primary Care Provider verified and updated as needed: Yes   Readmission within the last 30 days: previous discharge plan unsuccessful   Return Category: Progression of disease  Reason for Consult: discharge planning  Advance Care Planning: Advance Care Planning Reviewed: no concerns identified          Communication Assessment  Patient's communication style: spoken language (English or Bilingual)    Hearing Difficulty or Deaf: no   Wear Glasses or Blind: no    Cognitive  Cognitive/Neuro/Behavioral: WDL                      Living Environment:   People in home: parent(s), sibling(s)  Rohini  Current living Arrangements: house      Able to return to prior arrangements: yes       Family/Social Support:  Care provided by: parent(s)  Provides care for: no one, unable/limited ability to care for self  Marital Status: Single  Parent(s)          Description of Support System: Supportive, Involved    Support Assessment: Adequate family and caregiver support    Current Resources:   Patient receiving home care services: No     Community Resources: PCA, Other (see comment) (Mother is PCA)  Equipment currently used at home: none  Supplies currently used at home: None    Employment/Financial:  Employment Status: unemployed        Financial Concerns: none   Referral to Financial Worker: No       Does the patient's insurance plan have a 3 day qualifying hospital stay waiver?  No    Lifestyle & Psychosocial Needs:  Social Determinants of Health     Food Insecurity: Not on file   Depression: Not at risk (3/6/2023)    PHQ-2     PHQ-2 Score: 2   Housing Stability: Not on file   Tobacco Use: Low Risk  (5/15/2024)    Patient History     Smoking Tobacco Use: Never     Smokeless Tobacco Use: Never     Passive Exposure: Never   Financial Resource Strain: Not on file   Alcohol Use: Not on file  "  Transportation Needs: Not on file   Physical Activity: Not on file   Interpersonal Safety: Not At Risk (3/10/2023)    Humiliation, Afraid, Rape, and Kick questionnaire     Fear of Current or Ex-Partner: No     Emotionally Abused: No     Physically Abused: No     Sexually Abused: No   Stress: Not on file   Social Connections: Not on file   Health Literacy: Not on file       Functional Status:  Prior to admission patient needed assistance:   Dependent ADLs:: Independent  Dependent IADLs:: Transportation, Independent  Assesssment of Functional Status: Not at baseline with ADL Functioning    Mental Health Status:          Chemical Dependency Status:                Values/Beliefs:  Spiritual, Cultural Beliefs, Jain Practices, Values that affect care: no               Additional Information:  Per H&P: \" history of sickle cell anemia, stroke leading to cognitive delays and right upper extremity hemiparesis, iron overload due to chronic transfusions, past acute chest syndrome anxiety, depression and moderate persistent asthma who presented to the ED with bilateral flank and hip pain similar to past sickle cell crisis. \"    RNCC met with patient to complete initial assessment and introduce RNCC role.  Patient reports she lives in a home with her mother and her 3 siblings.  She states that her mother is her PCA, and she does most of her transportation. Patient reports that she is independent with all other cares at home.  She states that her mom helps her with things around the house during crisis.     Patient denies any mental health, CD, Spiritual, or financial concerns.  Patient states she isn't working and receiving disability benefits. Patient states that either her mother or a medical ride will be her transportation home.    RNCC to follow up with any discharge needs.     Corry Mattson RN    Nurse Coordinator     Covering for: [place] Celis    Phone: *[phone]    Social Work and Care Management " Department    SEARCHABLE in [facility] - search CARE COORDINATOR    [place] & [place] ([id number]) Saturday & Sunday; ([phone]) [facility] Recognized Holidays    Units: 5A, 5B & 5C  Pager: [phone]    Units: 6B, 6C & 6D    Pager: [phone]    Units: 7A, 7B, 7C & 7D    Pager: [phone]    Units: 6A & ICU   Pager: [phone]    Units: 5 Ortho, 5MS & WB ED Pager: [phone]    Units: 6MS, 8A & 10 ICU  Pager [phone]

## 2024-05-17 NOTE — CARE PLAN
End of shift Summary: See flowsheet for VS and detail assessments.  Changes this Shift:   Pulmonary:Pt is sating adequately with 2 LPM via NC. Pt denies chest pain and SOB.  Output: Pt is voiding adequately.  Activity:IND.  Skin: Intact.  Pain: Pt rates pain 5-8/10 managed with IV dilaudid.  Neuro/CMS: Alert and oriented x 4.  Dressings/Drains: None  IV: Left PIV infusing LR at 75 ml/hr. Left chest port SL.  Additional info:  Plan: Continue POC.

## 2024-05-17 NOTE — PLAN OF CARE
Goal Outcome Evaluation:      Plan of Care Reviewed With: patient    Overall Patient Progress: improving    Outcome Evaluation: Pt remains intermittently nauseous but has some relief with IV zofran. PRN IV dilaudid q2hrs and PRN IV toradol q6hrs available for pain. Hemoglobin 7.2 this AM.           VS: Temp: 97.5  F (36.4  C) Temp src: Oral BP: 116/69 Pulse: 98   Resp: 16 SpO2: 90 % O2 Device: None (Room air)       O2: >90% on room air. Denies SOB and CP, no cough present. Lung sounds clear.   Output: Voiding spontaneously and without difficulty. Pt is menstruating.   Last BM: 05/16/2024 per pt report   Activity: Up independently   Skin: WDL   Pain: Managed with IV dilaudid and IV toradol   Neuro: A & O x4, denies N/T   Dressing: N/A   Diet: Regular   LDA: L) chest port infusing LR at 75ml/hr, L) PIV SL   Equipment: Personal belongings, call light, IV pump/pole   Plan: Continue with plan of care   Additional Info:

## 2024-05-17 NOTE — PLAN OF CARE
Goal Outcome Evaluation:      VS: Blood pressure 95/51, pulse 72, temperature 97.8  F (36.6  C), temperature source Oral, resp. rate 22, SpO2 96%, not currently breastfeeding.   O2: RA. Denies SOB and CP.    Output: Voiding adequately in the bathroom without problem.    Last BM: 5/16   Activity: IND   Skin: Intact   Pain: Rates 5-8/10, managed with IV dilaudid   CMS: A&O x 4   Dressing: None   Diet: Regular. At 1400, pt c/o nausea and emesis episode x 1. Given IV Zofran with some relief.    LDA: L PIV infusing LR @ 75 ml/hr. Left chest port SL.    Equipment: IV pole, personal belongings   Plan: Continue POC.    Additional Info:

## 2024-05-17 NOTE — PLAN OF CARE
Goal Outcome Evaluation:      Plan of Care Reviewed With: patient    Overall Patient Progress: no change    Outcome Evaluation: Pt arrived to unit from ED at 1755. Abdominal US completed this shift, CT completed prior to arrival on unit. PRN IV dilaudid q 2hrs and PRN IV toradol q 6hrs for pain. IV zofran given x1 this shift for nausea. 1 unit RBC began infusing at 1957, no transfusion reaction. Pt appears to be sleeping in between cares.      VS: Temp: 97.7  F (36.5  C) Temp src: Oral BP: 118/70 Pulse: 77   Resp: 16 SpO2: 99 % O2 Device: Nasal cannula Oxygen Delivery: 2 LPM     O2: >95% on 2LPM via nasal cannula. Placed on 2LPM due to desatting to 88-89%. Now maintaining sats >95% on 2LPM. Denies SOB and CP, no cough present. Lung sounds clear.   Output: Voiding spontaneously and without difficulty   Last BM: 05/15/2024 per pt report   Activity: Up independently   Skin: Intact. 2 RN skin assessment done upon admission   Pain: Managed with IV dilaudid and IV toradol   Neuro: A & O x4, denies N/T   Dressing: N/A   Diet: Regular, had some nausea this shift. IV zofran given x1 with relief of symptoms   LDA: L) chest port infusing blood, L) PIV infusing LR at 75ml/hr   Equipment: Personal belongings, call light, IV pump/pole   Plan: Continue with plan of care   Additional Info: Recheck hemoglobin in AM

## 2024-05-17 NOTE — PLAN OF CARE
Goal Outcome Evaluation:      Plan of Care Reviewed With: patient    Overall Patient Progress: no changeOverall Patient Progress: no change       Corry Mattson, RN    Nurse Coordinator     Covering for:     Phone: *    Social Work and Care Management Department    SEARCHABLE in [facility] - search CARE COORDINATOR    [place] & [place] ([phone]) Saturday & Sunday; ([phone]) [facility] Recognized Holidays    Units: 5A, 5B & 5C  Pager: [phone]    Units: 6B, 6C & 6D    Pager: [phone]    Units: 7A, 7B, 7C & 7D    Pager: [phone]    Units: 6A & ICU   Pager: [phone]    Units: 5 Ortho, 5MS & WB ED Pager: [phone]    Units: 6MS, 8A & 10 ICU  Pager [phone]

## 2024-05-17 NOTE — CONSULTS
Hematology Consult Note   Date of Service: 05/17/2024     Patient: Jennifer Cervantes  MRN: [id number]  Admission Date: 5/16/2024  Hospital Day # 1   Primary Outpatient Hematologist: Dr. Duncan   Reason for Consult: Sickle cell pain crisis    History of Present Illness:    Jennifer Cervantes is a 24 year old woman with a history of sickle cell disease, stroke c/b cognitive developmental delay and RUE hemiparesis who presented with pain involving her bilateral flank and hip.  She has noted that over the past several weeks she has had intermittent abdominal pain with occasional vomiting. Her flank and hip pains began recently, and feels like her prior sickle cell pain. She has had a poor appetite, but has been able to eat some solids and maintains fluid intake. Denies fever, chills, HA, COB, cough, chest pain, palpitations, dysuria, new rashes.      In the ED she was satting well, hgb 6.6 (baseline ~7.0), WBC 16.6, platelets 476, total bilirubin 2.3.  The ED had talked with her outpatient hematologist and transfuse 1u pRBC. Pt started to feel better after getting blood and starting PRN dilaudid.     Review of Systems: Pertinent positive and negative systems described in HPI; the remainder of the 14 systems are negative    Past Medical History:  Past Medical History:   Diagnosis Date    Anxiety     Bleeding disorder (H24)     Blood clotting disorder (H24)     Cerebral infarction (H) 2015    Cognitive developmental delay     low IQ. 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 side (H)     right hand contractures    Iron overload due to repeated red blood cell transfusions     Migraines     Multiple subsegmental pulmonary emboli without acute cor pulmonale (H) 02/01/2021    Oppositional defiant behavior     Presence of intrauterine contraceptive device 2/18/2020    Superficial venous thrombosis of arm,  right 03/25/2021    Uncomplicated asthma        Past Surgical History:  Past Surgical History:   Procedure Laterality Date    AS INSERT TUNNELED CV 2 CATH W/O PORT/PUMP      CHOLECYSTECTOMY      CV RIGHT HEART CATH MEASUREMENTS RECORDED N/A 11/18/2021    Procedure: Right Heart Cath;  Surgeon: Jackson Stauffer MD;  Location:  HEART CARDIAC CATH LAB    INSERT PORT VASCULAR ACCESS Left 4/21/2021    Procedure: INSERTION, VASCULAR ACCESS PORT (NOT SURE ON SIDE UNTIL REMOVAL);  Surgeon: Rajan More MD;  Location: [facility] OR    IR CHEST PORT PLACEMENT > 5 YRS OF AGE  4/21/2021    IR CVC NON TUNNEL LINE REMOVAL  5/6/2021    IR CVC NON TUNNEL PLACEMENT > 5 YRS  04/07/2020    IR CVC NON TUNNEL PLACEMENT > 5 YRS  4/30/2021    IR CVC NON TUNNEL PLACEMENT > 5 YRS  9/7/2022    IR PORT REMOVAL LEFT  4/21/2021    REMOVE PORT VASCULAR ACCESS Left 4/21/2021    Procedure: REMOVAL, VASCULAR ACCESS PORT LEFT;  Surgeon: Rajan More MD;  Location: [facility] OR    REPAIR TENDON ELBOW Right 10/02/2019    Procedure: Right Elbow Flexor Lengthening, Flexor Pronator Slide Of Wrist and Finger, Thumb Adductor Lengthening;  Surgeon: Anai Franco MD;  Location: [facility] OR    TONSILLECTOMY Bilateral 10/02/2019    Procedure: Bilateral Tonsillectomy;  Surgeon: Farhana Guy MD;  Location: [facility] OR    ZZC BREAST REDUCTION (INCLUDES LIPO) TIER 3 Bilateral 04/23/2019       Social History:  Social History     Socioeconomic History    Marital status: Single     Spouse name: None    Number of children: None    Years of education: None    Highest education level: None   Tobacco Use    Smoking status: Never     Passive exposure: Never    Smokeless tobacco: Never   Substance and Sexual Activity    Alcohol use: Not Currently     Alcohol/week: 0.0 standard drinks of alcohol    Drug use: Never    Sexual activity: Not Currently     Partners: Male     Birth control/protection: Other   Social History Narrative    Lives with mom and extended family  "(mom is her PCA and ILS worker) but \"toxic environment\" per her report. As of 9/28/2022 she is planning to move out at some point soon. She has minimal support at home despite her significant SCD comorbidities and cognitive delay from stroke.     Social Determinants of Health     Interpersonal Safety: Not At Risk (3/10/2023)    Humiliation, Afraid, Rape, and Kick questionnaire     Fear of Current or Ex-Partner: No     Emotionally Abused: No     Physically Abused: No     Sexually Abused: No        Family History  Family History   Problem Relation Age of Onset    Sickle Cell Trait Mother     Hypertension Mother     Asthma Mother     Sickle Cell Trait Father     Glaucoma No family hx of     Macular Degeneration No family hx of     Diabetes No family hx of     Gout No family hx of        Outpatient Medications:  Current Facility-Administered Medications   Medication Dose Route Frequency Provider Last Rate Last Admin    acetaminophen (TYLENOL) tablet 975 mg  975 mg Oral Q8H Sisi Cardenas PA   975 mg at 05/17/24 1615    albuterol (PROVENTIL HFA/VENTOLIN HFA) inhaler  2 puff Inhalation Q6H PRN Sisi Cardenas PA        albuterol (PROVENTIL) neb solution 5 mg  5 mg Nebulization Q6H PRN Sisi Cardenas PA        aspirin (ASA) chewable tablet 81 mg  81 mg Oral BID Sisi Cardenas PA   81 mg at 05/17/24 0808    calcium carbonate (TUMS) chewable tablet 1,000 mg  1,000 mg Oral 4x Daily PRN Sisi Cardenas PA        deferasirox (JADENU) tablet 1,440 mg  1,440 mg Oral QPM Sisi Cardenas PA        docusate sodium (COLACE) capsule 100 mg  100 mg Oral BID Sisi Cardenas PA   100 mg at 05/17/24 0808    fluticasone-vilanterol (BREO ELLIPTA) 200-25 MCG/ACT inhaler 1 puff  1 puff Inhalation Daily Sisi Cardenas PA   1 puff at 05/17/24 0929    HYDROmorphone (DILAUDID) injection 1 mg  1 mg Intravenous Q2H PRN Sisi Cardenas PA   1 mg at 05/17/24 1615    hydroxyurea (HYDREA) capsule 3,000 mg  3,000 mg Oral Daily Sisi Cardenas PA   " 3,000 mg at 05/17/24 0912    ketorolac (TORADOL) injection 30 mg  30 mg Intravenous Q6H PRN Sisi Cardenas PA   30 mg at 05/16/24 2050    lactated ringers infusion   Intravenous Continuous Sisi Cardenas PA 75 mL/hr at 05/17/24 0932 New Bag at 05/17/24 0932    lidocaine (LMX4) cream   Topical Q1H PRN Sisi Cardenas PA        lidocaine 1 % 0.1-1 mL  0.1-1 mL Other Q1H PRN Sisi Cardenas PA        methocarbamol (ROBAXIN) tablet 750 mg  750 mg Oral 4x Daily PRN Sisi Cardenas PA        naloxone (NARCAN) injection 0.2 mg  0.2 mg Intravenous Q2 Min PRN Nadia Philippe MD        Or    naloxone (NARCAN) injection 0.4 mg  0.4 mg Intravenous Q2 Min PRN Nadia Philippe MD        Or    naloxone (NARCAN) injection 0.2 mg  0.2 mg Intramuscular Q2 Min PRN Nadia Philippe MD        Or    naloxone (NARCAN) injection 0.4 mg  0.4 mg Intramuscular Q2 Min PRN Nadia Philippe MD        ondansetron (ZOFRAN) injection 8 mg  8 mg Intravenous Q8H PRN Sisi Cardenas PA   8 mg at 05/17/24 1414    Or    ondansetron (ZOFRAN ODT) ODT tab 8 mg  8 mg Oral Q8H PRN Sisi Cardenas PA        Patient is already receiving mechanical prophylaxis   Does not apply Continuous PRN Sisi Cardenas PA        senna-docusate (SENOKOT-S/PERICOLACE) 8.6-50 MG per tablet 1 tablet  1 tablet Oral BID PRN Sisi Cardenas PA   1 tablet at 05/17/24 0808    Or    senna-docusate (SENOKOT-S/PERICOLACE) 8.6-50 MG per tablet 2 tablet  2 tablet Oral BID PRN Sisi Cardenas PA        sennosides (SENOKOT) tablet 8.6 mg  8.6 mg Oral BID Sisi Cardenas PA        sodium chloride (PF) 0.9% PF flush 3 mL  3 mL Intracatheter Q8H Sisi Cardenas PA   3 mL at 05/17/24 0143    sodium chloride (PF) 0.9% PF flush 3 mL  3 mL Intracatheter q1 min prn Sisi Cardenas PA            Physical Exam:    /69 (BP Location: Right arm)   Pulse 98   Temp 97.5  F (36.4  C) (Oral)   Resp 16   LMP  (LMP Unknown)   SpO2 90%   General: alert and cooperative, NAD  HEENT: NC/AT,  sclera anicteric, MMM  CV: RRR  Resp: CTAB, normal work of breathing on room air   GI: soft, non-distended  MSK: warm and well-perfused  Skin: no rashes on exposed skin  Neuro: Alert and interactive, moves all extremities equally  Psych: Mood and affect normal     Labs & Studies: I personally reviewed the following studies:  ROUTINE LABS (Last four results):  CMP  Recent Labs   Lab 05/17/24  0658 05/16/24  1218    138   POTASSIUM 4.2 4.2   CHLORIDE 107 106   CO2 26 25   ANIONGAP 8 7   GLC 86 86   BUN 6.6 10.9   CR 0.57 0.56   GFRESTIMATED >90 >90   MICAH 8.3* 8.6   MAG  --  2.0   PROTTOTAL 6.5 6.6   ALBUMIN 4.1 4.2   BILITOTAL 2.6* 2.3*   ALKPHOS 54 53   AST 36 34   ALT 20 21     CBC  Recent Labs   Lab 05/17/24  0658 05/16/24  1218   WBC 11.6* 16.6*   RBC 2.51* 2.15*   HGB 7.4* 6.6*   HCT 21.9* 18.9*   MCV 87 88   MCH 29.5 30.7   MCHC 33.8 34.9   RDW 20.3* 21.2*    476*     INR  Recent Labs   Lab 05/17/24  0658 05/16/24  1218   INR 1.21* 1.29*     CT CAP 5/16/24:   IMPRESSION:   1. Borderline cardiomegaly. Mild atelectatic lower lobe opacities  bilaterally.  2. Minimal subchondral sclerosis in the right femoral head which could  represent early avascular necrosis.  3. Mild mesenteric and retroperitoneal lymphadenopathy similar to  7/10/2021.  4. Continued decreased size and calcification of the spleen consistent  with history of sickle cell disease    Assessment & Plan:   Jennifer Cervantes is a 24 year old woman with a history of sickle cell disease, stroke c/b cognitive developmental delay and RUE hemiparesis who presented with pain involving her bilateral flank and hip. She had also endorsed several weeks of intermittent abdominal pain. Overall her presentation was most consistent with a sickle cell pain crisis. She did not have a large oxygen requirement or respiratory symptoms to suggest acute chest syndrome. Her abdominal pain work-up was largely unrevealing so far, including CT AP without acute findings  and abdominal US which was negative for portal vein thrombus.     General Sickle Cell Management   - Monitor CBC w/ diff, retic count, bilirubin daily for several days then ok to reduce frequency if clinically improved  - Please do NOT transfuse blood unless discussed with Hematology first. Do NOT transfuse based solely off hemoglobin level given risk of iron overload and risk of developing RBC antibodies. Low hemoglobin normal in sickle cell disease based off congenital anemia.   - Continue home hydroxyurea, 3000 mg daily   - Continue folic acid  - Agree with DVT prophylaxis    Acute vaso-occlusive pain episode   - Continue aggressive pain control per primary team and care plan  - Consider scheduled tylenol, NSAIDs (ketorolac or ibuprofen), gabapentin,   - Consider topicals (lidocaine patch, diclofenac gel, BenGay, warm packs) if patient finds these helpful     Inpatient pain plan:   Inpatient: (pt preferred PRN dilaudid instead of PCA this admission)   PCA Dilaudid 1 hr q30 minutes, no basal rate  Toradol 30 mg x6h x 4   LR maintenance x 1-2 days  Other Medications: Zofran  ASA  Supportive Care: Docusate, Senna    Monitor for adverse effects of opioids:  - Encourage bowel regimen, including Senna. Consider methylnaltrexone if opioid induced constipation is severe.  - Consider diphenhydramine, hydroxyzine PRN for pruritis, consider trial of gabapentin if refractory.  - Antiemetics PRN for nausea    Acute chest prevention:  - Monitor O2 sats and vitals  - Encourage incentive spirometry q1h WA  - Encourage ambulation, consider PT consult  - Monitor for fluid overload and discontinue fluids if taking adequate po  - Please page hematology if patient develops fever, acute shortness of breath or other symptoms of acute chest syndrome.     Recommendations:   - Agree with pain control with dilaudid PRN (pt preferred over PCA), currently dilaudid 1mg q2hr PRN  - CBC w/ diff, CMP, retic daily for the next several days   -  Consider scheduled tylenol and toradol if needed for further pain management   - S/p 1u pRBC transfusion (5/16), can hold off on further transfusion   - FYI pt requested to have PRN nebulizer for her asthma (already has albuterol inhaler and neb ordered)   - Add incentive spirometer     Patient was seen and plan of care was discussed with attending physician Dr. Villagran.    We will continue to follow this patient.   Yan Houston MD   Hematology/Oncology Fellow PGY5  Pager: [phone]

## 2024-05-17 NOTE — UTILIZATION REVIEW
Admission Status; Secondary Review Determination     Admission Date: 5/16/2024 11:15 AM       Under the authority of the Utilization Management Committee, the utilization review process indicated a secondary review on the above patient.  The review outcome is based on review of the medical records, discussions with staff, and applying clinical experience noted on the date of the review.        (x)      Inpatient Status Appropriate - This patient's medical care is consistent with medical management for inpatient care and reasonable inpatient medical practice.       RATIONALE FOR DETERMINATION      Brief clinical presentation, information copied from the chart, abbreviated and edited for relevant content:     Despite no PCA, would consider her appropriate for IP.     Jennifer Cervantes is a 24 year old female admitted on 5/16/2024. She has a history of sickle cell anemia, stroke leading to cognitive delays and right upper extremity hemiparesis, iron overload due to chronic transfusions, past acute chest syndrome anxiety, depression and moderate persistent asthma who presented to the ED with bilateral flank and hip pain similar to past sickle cell crisis.  In the ED found to have hgb 6.6. WBC 16.6. CMP with elevated tbili otherwise unremarkable.Transfused 1 unit PRBC per ED but Heme does not recommend anymore without their approval. Daily CBC in AM . Assess for portal vein thrombosis. Pain management: Patient prefers IV dilaudid PRN doses rather than PCA IV dilaudid 1 mg every 2 hours PRN and Toradol 30 mg every 6 hours. Plan IVF LR maintenance X 1-2 days.  CT and ultrasound abdomen.       At the time of admission with the information available to the attending physician, more than 2 nights hospital complex care was anticipated. Also, there was a risk of adverse outcome if patient was treated outpatient or observation. High intensity of services anticipated. Inpatient admission appropriate based on Medicare guidelines.        The information on this document is developed by the utilization review team in order for the business office to ensure compliance.  This only denotes the appropriateness of proper admission status and does not reflect the quality of care rendered.         The definitions of Inpatient Status and Observation Status used in making the determination above are those provided in the CMS Coverage Manual, Chapter 1 and Chapter 6, section 70.4.      Sincerely,      Scarlett Hassan MD   Utilization Review/ Case Management  [facility].

## 2024-05-17 NOTE — PROGRESS NOTES
[facility]    Medicine Progress Note - Hospitalist Service, GOLD TEAM 16    Date of Admission:  5/16/2024    Assessment & Plan   Jennifer Cervantes is a 24 year old female admitted on 5/16/2024.  She has a history of sickle cell anemia, stroke leading to cognitive delays and right upper extremity hemiparesis, iron overload due to chronic transfusions, past acute chest syndrome anxiety, depression and moderate persistent asthma who presented to the ED on 5/16 with bilateral flank and hip pain similar to past sickle cell crisis.      Sickle cell anemia   Sickle cell vaso-occlusive pain crisis    Chronic pain   ---   Primary hematologist is Dr. Duncan.   ---   Presented with bilateral flank and hip pain similar to prior sickle cell pain crisis.   ---   In the ED found to have Hgb 6.6 g, WBC 16.6, CMP with elevated total bili otherwise unremarkable.  LA 0.6.  No hypoxia.   ---   Denied respiratory symptoms.   ---   ED staff discussed pt's Hgb with Dr. Duncan who recommended 1 unit pRBC transfusion which she received in the ED  ---   Hgb is 7.4 g today  ---   CT AP w/o contrast obtained for eval of abd pain revealed minimal subchondral sclerosis in the right femoral head which could represent early avascular necrosis but no acute intra-abd or pelvic pathology  ---   Hematology service consulted   ---   Repeat CBC in AM   ---   Abd US negative for portal vein thrombosis   ---   UA grossly negative for infection  ---   Pain management:               -Patient prefers IV dilaudid PRN doses rather than PCA (of note, PCA dilaudid dosing in care plan does not specify dose of dilaudid and Toradol dosing unclear; should be revisited by hematology team.)   -IV dilaudid 1 mg every 2 hours PRN   -Toradol 30 mg every 6 hours    ---   LR maintenance X 1-2 days  ---   Bowel regimen with docusate and senna   ---   Continue PTA hydrozyurea 3000 mg daily   ---   Methocarbamol 750 mg QID PRN   ---    Zofran 8 mg every 8 hour PRN  ---   Omeprazole 20 mg dialy      Hemochromatosis   Iron overload   ---   Interval FerriScan while on chelation 5/3/24 with marked hepatic hemachromatosis, iron concentrations which have increased since prior scan 9/2023.   ---   Continue Jadenu 1440 mg qpm     Abdominal pain   Hx of cholecystectomy   ---   Presented with cramping abd discomfort.    ---   Patient reports longstanding history of intermittent abdominal pain, status post cholecystectomy.  Following cholecystectomy she had improvement in pain however intermittent abdominal pain developed again over the last several weeks.    ---   She notes she was scheduled for an EGD, however this was postponed for November 2024.    ---   In the ED lipase wnl. Tbili elevated at 2.3.   ---   Given abdominal pain and sickle cell, Dr. Duncan recommended evaluation for portal vein thrombosis.   ---   US abd negative for portal vein thrombosis   ---   CT AP w/o contrast obtained for eval of abd pain revealed minimal subchondral sclerosis in the right femoral head which could represent early avascular necrosis but no acute intra-abd or pelvic pathology  ---   Abdominal pain resolved since admission    Hx of CVA   ---   ASA 81mg BID      Anxiety   Depression   ---   Not currently on any medications.      Moderate persistent asthma  ---   With recent possible asthma exacerbation requring hospital admission due to acute hypoxic respiratory failure.  CT chest at that time (4/23/2024) with mild dependent atelectasis bilaterally with no other acute abnormalities. Respiratory status improved with treatment of asthma exacerbation patient was on room air at time of discharge.  She was started on prednisone to complete 7-day course of 40 mg daily.  ---   No pulmonary symptoms since admission   ---   On RA   ---   Continue PTA albuterol PRN   ---   Continue albuterol nebs as needed  ---   Continue PTA Symbicort BID  ---   Continue cetirizine 10 mg  daily             Cardiac Monitoring: None  Code Status:  Full code  Diet: Regular Diet Adult    DVT Prophylaxis: Pneumatic Compression Devices  Lopez Catheter: Not present  Lines: PRESENT  Port A Cath Single 04/21/21 Left Chest wall-Site Assessment: WDL   Disposition Plan    Anticipate 3-5 days of hospitalization for for sickle cell vaso-occlusive pain crisis            Ramu Christian MD  Hospitalist Service, GOLD TEAM 16  [facility]  Securely message with [employer] (more info)  Text page via [facility] Paging/Directory   See signed in provider for up to date coverage information  ______________________________________________________________________    Interval History   Denied abdominal pain or chest pain  Denied SOB, on room air  Current pain regimen working fine  Sleepy, unable to stay awake during my interview  Resting comfortably in bed  She had no complaint during my evaluation  Uneventful night    Physical Exam   Vital Signs: Temp: 97.8  F (36.6  C) Temp src: Oral BP: 95/51 Pulse: 72   Resp: 22 SpO2: 96 % O2 Device: Nasal cannula Oxygen Delivery: 2 LPM  Weight: 0 lbs 0 oz  General: aao x 3, NAD.  HEENT:  NC/AT, PERRL, EOMI, neck supple, no thyromegaly, op clear, mmm.  CVS:  NL s 1 and s2, no m/r/g.  Lungs:  CTA B/L.   Abd:  Soft, + bs, NT, no rebound or gaurding, no fluid shift.  Ext:  No c/c.  Lymph:  No edema.  Neuro: Upper extremity weakness noted  Musculoskeletal: No calf tenderness to palpation.    Skin:  No rash.  Psychiatry:  Mood and affect appropriate.      Data     I have personally reviewed the following data over the past 24 hrs:    11.6 (H)  \   7.4 (L)   / 406     141 107 6.6 /  86   4.2 26 0.57 \     ALT: 20 AST: 36 AP: 54 TBILI: 2.6 (H)   ALB: 4.1 TOT PROTEIN: 6.5 LIPASE: N/A     INR:  1.21 (H) PTT:  N/A   D-dimer:  N/A Fibrinogen:  N/A     Ferritin:  N/A % Retic:  20.1 (H) LDH:  N/A       Imaging results reviewed over the past 24 hrs:   Recent Results  (from the past 24 hour(s))   CT Abdomen Pelvis w/o Contrast    Narrative    CT ABDOMEN PELVIS W/O CONTRAST 5/16/2024 5:50 PM    CLINICAL HISTORY: Abdominal pain/tender, no fever, IV contrast allergy  TECHNIQUE: CT scan of the abdomen and pelvis was performed without IV  contrast. Multiplanar reformats were obtained. Dose reduction  techniques were used.  CONTRAST: None.    COMPARISON: Limited ferriscan MR 5/3/2024, CT chest 4/20/2024, CT  abdomen/pelvis 7/10/2021    FINDINGS:   LOWER CHEST: Borderline cardiomegaly. Anemic blood pool. Mild  atelectatic opacities in the lung bases bilaterally.    HEPATOBILIARY: Unremarkable nonenhanced appearance of the liver.  Cholecystectomy. Nondilated bile ducts.    PANCREAS: Normal.    SPLEEN: Small calcified spleen consistent with history of sickle cell  disease.    ADRENAL GLANDS: Normal.    KIDNEYS/BLADDER: Normal.    BOWEL: No obstruction or inflammatory change. Normal appendix.  Unremarkable terminal ileum and ileocecal valve. Moderate amount of  colonic stool.    LYMPH NODES: Mild mesenteric and retroperitoneal lymphadenopathy  similar to 7/10/2021.    VASCULATURE: Unremarkable.    PELVIC ORGANS: Normal.    OTHER: Trace pelvic free fluid. Small fat-containing periumbilical  hernia.    MUSCULOSKELETAL: Minimal subchondral sclerosis in the right femoral  head (series 2 image 42).      Impression    IMPRESSION:   1. Borderline cardiomegaly. Mild atelectatic lower lobe opacities  bilaterally.  2. Minimal subchondral sclerosis in the right femoral head which could  represent early avascular necrosis.  3. Mild mesenteric and retroperitoneal lymphadenopathy similar to  7/10/2021.  4. Continued decreased size and calcification of the spleen consistent  with history of sickle cell disease.    I have personally reviewed the examination and initial interpretation  and I agree with the findings.    VIOLA ESCOBAR MD         SYSTEM ID:  [id number]   US Abdomen Limited w Doppler Complete     Narrative    EXAMINATION: US ABDOMEN LIMITED WITH DOPPLER COMPLETE 5/16/2024 6:49  PM     COMPARISON: CT same day on    HISTORY: sickle cell, abdo pain, r/o Portal vein thrombosis    TECHNIQUE: The abdomen was scanned in standard fashion with  specialized ultrasound transducer(s) using both gray-scale, color  Doppler, and spectral flow techniques.    Findings:  Liver: The liver demonstrates normal homogeneous echotexture. No  evidence of a focal hepatic mass.     Extrahepatic portal vein flow is antegrade at 34 cm/s.  Right portal vein flow is antegrade, measuring 29 cm/s.  Left portal vein flow is antegrade, measuring 19 cm/s.    Flow in the hepatic artery is towards the liver and:  95 cm/s peak systolic  0.75 resistive index.     The splenic vein is patent and flow is towards the liver.  The left,  middle, and right hepatic veins are patent with flow towards the IVC.  The IVC is patent with flow towards the heart.   The visualized aorta  is not dilated.    Gallbladder: Cholecystectomy.    Bile Ducts: Both the intra- and extrahepatic biliary system are of  normal caliber.  The common bile duct measures 4 mm.    Pancreas: Visualized portions of the head and body of the pancreas are  unremarkable.     Kidney: The right kidney measures 11.4 cm long. There is no  hydronephrosis or hydroureter, no shadowing renal calculi, cystic  lesion or mass.     Fluid: No evidence of ascites or pleural effusions.      Impression    Impression:   Patent Doppler evaluation of the hepatic vasculature. No portal vein  thrombus.    I have personally reviewed the examination and initial interpretation  and I agree with the findings.    VIOLA ESCOBAR MD         SYSTEM ID:  [id number]

## 2024-05-18 PROCEDURE — 120N000002 HC R&B MED SURG/OB UMMC

## 2024-05-18 PROCEDURE — 250N000011 HC RX IP 250 OP 636: Performed by: PHYSICIAN ASSISTANT

## 2024-05-18 PROCEDURE — 99233 SBSQ HOSP IP/OBS HIGH 50: CPT | Performed by: INTERNAL MEDICINE

## 2024-05-18 PROCEDURE — 258N000003 HC RX IP 258 OP 636: Performed by: PHYSICIAN ASSISTANT

## 2024-05-18 PROCEDURE — 250N000013 HC RX MED GY IP 250 OP 250 PS 637: Performed by: PHYSICIAN ASSISTANT

## 2024-05-18 RX ADMIN — ASPIRIN 81 MG CHEWABLE TABLET 81 MG: 81 TABLET CHEWABLE at 19:59

## 2024-05-18 RX ADMIN — FLUTICASONE FUROATE AND VILANTEROL TRIFENATATE 1 PUFF: 200; 25 POWDER RESPIRATORY (INHALATION) at 09:04

## 2024-05-18 RX ADMIN — HYDROMORPHONE HYDROCHLORIDE 1 MG: 1 INJECTION, SOLUTION INTRAMUSCULAR; INTRAVENOUS; SUBCUTANEOUS at 18:47

## 2024-05-18 RX ADMIN — HYDROMORPHONE HYDROCHLORIDE 1 MG: 1 INJECTION, SOLUTION INTRAMUSCULAR; INTRAVENOUS; SUBCUTANEOUS at 06:47

## 2024-05-18 RX ADMIN — HYDROMORPHONE HYDROCHLORIDE 1 MG: 1 INJECTION, SOLUTION INTRAMUSCULAR; INTRAVENOUS; SUBCUTANEOUS at 14:32

## 2024-05-18 RX ADMIN — HYDROMORPHONE HYDROCHLORIDE 1 MG: 1 INJECTION, SOLUTION INTRAMUSCULAR; INTRAVENOUS; SUBCUTANEOUS at 02:07

## 2024-05-18 RX ADMIN — HYDROMORPHONE HYDROCHLORIDE 1 MG: 1 INJECTION, SOLUTION INTRAMUSCULAR; INTRAVENOUS; SUBCUTANEOUS at 11:52

## 2024-05-18 RX ADMIN — HYDROMORPHONE HYDROCHLORIDE 1 MG: 1 INJECTION, SOLUTION INTRAMUSCULAR; INTRAVENOUS; SUBCUTANEOUS at 22:54

## 2024-05-18 RX ADMIN — ACETAMINOPHEN 975 MG: 325 TABLET, FILM COATED ORAL at 16:39

## 2024-05-18 RX ADMIN — ACETAMINOPHEN 975 MG: 325 TABLET, FILM COATED ORAL at 00:33

## 2024-05-18 RX ADMIN — HYDROMORPHONE HYDROCHLORIDE 1 MG: 1 INJECTION, SOLUTION INTRAMUSCULAR; INTRAVENOUS; SUBCUTANEOUS at 04:44

## 2024-05-18 RX ADMIN — ONDANSETRON 8 MG: 2 INJECTION INTRAMUSCULAR; INTRAVENOUS at 16:01

## 2024-05-18 RX ADMIN — ACETAMINOPHEN 975 MG: 325 TABLET, FILM COATED ORAL at 08:56

## 2024-05-18 RX ADMIN — HYDROXYUREA 3000 MG: 300 CAPSULE ORAL at 09:04

## 2024-05-18 RX ADMIN — ASPIRIN 81 MG CHEWABLE TABLET 81 MG: 81 TABLET CHEWABLE at 08:56

## 2024-05-18 RX ADMIN — HYDROMORPHONE HYDROCHLORIDE 1 MG: 1 INJECTION, SOLUTION INTRAMUSCULAR; INTRAVENOUS; SUBCUTANEOUS at 16:39

## 2024-05-18 RX ADMIN — HYDROMORPHONE HYDROCHLORIDE 1 MG: 1 INJECTION, SOLUTION INTRAMUSCULAR; INTRAVENOUS; SUBCUTANEOUS at 08:54

## 2024-05-18 RX ADMIN — HYDROMORPHONE HYDROCHLORIDE 1 MG: 1 INJECTION, SOLUTION INTRAMUSCULAR; INTRAVENOUS; SUBCUTANEOUS at 20:55

## 2024-05-18 RX ADMIN — SODIUM CHLORIDE, POTASSIUM CHLORIDE, SODIUM LACTATE AND CALCIUM CHLORIDE: 600; 310; 30; 20 INJECTION, SOLUTION INTRAVENOUS at 11:57

## 2024-05-18 ASSESSMENT — ACTIVITIES OF DAILY LIVING (ADL)
ADLS_ACUITY_SCORE: 21

## 2024-05-18 NOTE — PROGRESS NOTES
[facility]    Medicine Progress Note - Hospitalist Service, GOLD TEAM 16    Date of Admission:  5/16/2024    Assessment & Plan   Jennifer Cervantes is a 24 year old female admitted on 5/16/2024.  She has a history of sickle cell anemia, stroke leading to cognitive delays and right upper extremity hemiparesis, iron overload due to chronic transfusions, past acute chest syndrome anxiety, depression and moderate persistent asthma who presented to the ED on 5/16 with bilateral flank and hip pain similar to past sickle cell crisis.      Sickle cell anemia   Sickle cell vaso-occlusive pain crisis    Chronic pain   ---   Primary hematologist is Dr. Duncan.   ---   Presented with bilateral flank and hip pain similar to prior sickle cell pain crisis.   ---   In the ED found to have Hgb 6.6 g, WBC 16.6, CMP with elevated total bili otherwise unremarkable.  LA 0.6.  No hypoxia.   ---   Denied respiratory symptoms.   ---   ED staff discussed pt's Hgb with Dr. Duncan who recommended 1 unit pRBC transfusion which she received in the ED  ---   She rate her pain today at 8 out of 10 today  ---   Hgb was 7.4 g on 5/17  ---   CT AP w/o contrast obtained for eval of abd pain revealed minimal subchondral sclerosis in the right femoral head which could represent early avascular necrosis but no acute intra-abd or pelvic pathology  ---   Hematology service consulted   ---   Repeat CBC in AM   ---   Abd US negative for portal vein thrombosis   ---   UA grossly negative for infection  ---   Pain management:               -Patient prefers IV dilaudid PRN doses rather than PCA (of note, PCA dilaudid dosing in care plan does not specify dose of dilaudid and Toradol dosing unclear; should be revisited by hematology team.)   -IV dilaudid 1 mg every 2 hours PRN   -Toradol 30 mg every 6 hours    ---   LR maintenance X 1-2 days  ---   Bowel regimen with docusate and senna   ---   Continue PTA hydrozyurea  3000 mg daily   ---   Methocarbamol 750 mg QID PRN   ---   Zofran 8 mg every 8 hour PRN  ---   Omeprazole 20 mg dialy      Hemochromatosis   Iron overload   ---   Interval FerriScan while on chelation 5/3/24 with marked hepatic hemachromatosis, iron concentrations which have increased since prior scan 9/2023.   ---   Continue Jadenu 1440 mg qpm     Abdominal pain   Hx of cholecystectomy   ---   Presented with cramping abd discomfort.    ---   Patient reports longstanding history of intermittent abdominal pain, status post cholecystectomy.  Following cholecystectomy she had improvement in pain however intermittent abdominal pain developed again over the last several weeks.    ---   She notes she was scheduled for an EGD, however this was postponed for November 2024.    ---   In the ED lipase wnl. Tbili elevated at 2.3.   ---   Given abdominal pain and sickle cell, Dr. Duncan recommended evaluation for portal vein thrombosis.   ---   US abd negative for portal vein thrombosis   ---   CT AP w/o contrast obtained for eval of abd pain revealed minimal subchondral sclerosis in the right femoral head which could represent early avascular necrosis but no acute intra-abd or pelvic pathology  ---   Abdominal pain resolved     Hx of CVA   ---   ASA 81mg BID      Anxiety   Depression   ---   Not currently on any medications.      Moderate persistent asthma  ---   With recent possible asthma exacerbation requring hospital admission due to acute hypoxic respiratory failure.  CT chest at that time (4/23/2024) with mild dependent atelectasis bilaterally with no other acute abnormalities. Respiratory status improved with treatment of asthma exacerbation patient was on room air at time of discharge.  She was started on prednisone to complete 7-day course of 40 mg daily.  ---   No pulmonary symptoms since admission   ---   On RA   ---   Continue PTA albuterol PRN   ---   Continue albuterol nebs as needed  ---   Continue PTA Symbicort  BID  ---   Continue cetirizine 10 mg daily             Cardiac Monitoring: None  Code Status:  Full code  Diet: Regular Diet Adult    DVT Prophylaxis: Pneumatic Compression Devices  Lopez Catheter: Not present  Lines: PRESENT  Port A Cath Single 04/21/21 Left Chest wall-Site Assessment: WDL   Disposition Plan    Anticipate 2-3 days of hospitalization for for sickle cell vaso-occlusive pain crisis            Ramu Christian MD  Hospitalist Service, GOLD TEAM 16  [facility]  Securely message with [employer] (more info)  Text page via [employer] Paging/Directory   See signed in provider for up to date coverage information  ______________________________________________________________________    Interval History   Denied abd or chest pain  She rate her sickle cell pain crisis as 8 out 10 today  She says current pain meds help  No new complaints    Physical Exam   Vital Signs: Temp: 98.1  F (36.7  C) Temp src: Oral BP: 111/64 Pulse: 100   Resp: 16 SpO2: 92 % O2 Device: None (Room air)    Weight: 0 lbs 0 oz  General: aao x 3, NAD.  HEENT:  NC/AT, PERRL, EOMI, neck supple, no thyromegaly, op clear, mmm.  CVS:  NL s 1 and s2, no m/r/g.  Lungs:  CTA B/L.   Abd:  Soft, + bs, NT, no rebound or gaurding, no fluid shift.  Ext:  No c/c.  Lymph:  No edema.  Neuro: Upper extremity weakness noted  Musculoskeletal: No calf tenderness to palpation.    Skin:  No rash.  Psychiatry:  Mood and affect appropriate.      Data         Imaging results reviewed over the past 24 hrs:   No results found for this or any previous visit (from the past 24 hour(s)).

## 2024-05-18 NOTE — PROGRESS NOTES
VS: /81 (BP Location: Right arm)   Pulse 101   Temp 98.1  F (36.7  C) (Oral)   Resp 16   LMP  (LMP Unknown)   SpO2 92%         O2: >90% on room air. Denies SOB and CP, no cough present. Lung sounds clear.   Output: Voiding spontaneously and without difficulty. Pt is menstruating.   Last BM: 05/16/2024 per pt report, declined bowel meds this am   Activity: Up independently   Skin: WDL   Pain: Managed with IV dilaudid    Neuro: A & O x4, denies N/T   Dressing: N/A   Diet: Regular   LDA: L) chest port infusing LR at 75ml/hr, L) PIV SL   Equipment: Personal belongings, call light, IV pump/pole   Plan: Continue with plan of care   Additional Info:

## 2024-05-18 NOTE — PLAN OF CARE
Goal Outcome Evaluation:      Plan of Care Reviewed With: patient    Overall Patient Progress: improvingOverall Patient Progress: improving    Outcome Evaluation: No c/o nausea. Resting between cares and pain meds. Frequency extending between pain med requests. Can make her needs known.

## 2024-05-18 NOTE — PLAN OF CARE
Goal Outcome Evaluation:      Plan of Care Reviewed With: patient    Overall Patient Progress: improving    Outcome Evaluation: Pt appears to be resting comfortably in between cares. IV dialudid PRN q2hrs for pain.         VS: Temp: 98.1  F (36.7  C) Temp src: Oral BP: 119/81 Pulse: 101   Resp: 16 SpO2: 92 % O2 Device: None (Room air)       O2: >90% on room air. Denies SOB and CP, no cough present. Lung sounds clear.   Output: Voiding spontaneously and without difficulty. Pt is menstruating.   Last BM: 05/16/2024 per pt report   Activity: Up independently   Skin: WDL   Pain: Managed with IV dilaudid and IV toradol   Neuro: A & O x4, denies N/T   Dressing: N/A   Diet: Regular. One episode of emesis this shift, IV zofran given and pt stated relief of signs/symptoms. Writer encouraged pt to eat more bland foods.   LDA: L) chest port infusing LR at 75ml/hr, L) PIV SL   Equipment: Personal belongings, call light, IV pump/pole   Plan: Continue with plan of care   Additional Info:

## 2024-05-18 NOTE — PLAN OF CARE
Goal Outcome Evaluation:      Plan of Care Reviewed With: patient    Overall Patient Progress: no change    VS: Temp: 97.7  F (36.5  C) Temp src: Oral BP: 117/75 Pulse: 96   Resp: 20 SpO2: 92 % O2 Device: None (Room air)   O2: SpO2 > 90% and stable on RA. LS clear and equal bilaterally. Denies chest pain and SOB.    Output: Voids spontaneously without difficulty to bathroom.   Last BM: 5/16/2024. BS active / passing flatus.    Activity: Up SBA .   Skin: Intact   Pain: Pain managed with scheduled Tylenol & PRN IV Dilaudid. Other pain medicine was offered to pt, but pt declined.    CMS: Intact, AOx4. Denies numbness and tingling.   Dressing: None   Diet: Regular diet. Denies nausea/vomiting.    LDA: R PIV SL. L upper chest Port infusing LR @ 75 mL/hr   Equipment: IV pole, personal belongings,    Plan: Contact precautions maintained. Continue with plan of care &pain management. Call light within reach, pt able to make needs known.

## 2024-05-19 LAB
ERYTHROCYTE [DISTWIDTH] IN BLOOD BY AUTOMATED COUNT: 22.3 % (ref 10–15)
HCT VFR BLD AUTO: 20.2 % (ref 35–47)
HGB BLD-MCNC: 6.8 G/DL (ref 11.7–15.7)
HOLD SPECIMEN: NORMAL
MCH RBC QN AUTO: 29.4 PG (ref 26.5–33)
MCHC RBC AUTO-ENTMCNC: 33.7 G/DL (ref 31.5–36.5)
MCV RBC AUTO: 87 FL (ref 78–100)
PLATELET # BLD AUTO: 438 10E3/UL (ref 150–450)
RBC # BLD AUTO: 2.31 10E6/UL (ref 3.8–5.2)
WBC # BLD AUTO: 11.8 10E3/UL (ref 4–11)

## 2024-05-19 PROCEDURE — 250N000011 HC RX IP 250 OP 636: Performed by: PHYSICIAN ASSISTANT

## 2024-05-19 PROCEDURE — 120N000002 HC R&B MED SURG/OB UMMC

## 2024-05-19 PROCEDURE — 250N000013 HC RX MED GY IP 250 OP 250 PS 637: Performed by: PHYSICIAN ASSISTANT

## 2024-05-19 PROCEDURE — 250N000011 HC RX IP 250 OP 636: Performed by: INTERNAL MEDICINE

## 2024-05-19 PROCEDURE — 99232 SBSQ HOSP IP/OBS MODERATE 35: CPT | Performed by: INTERNAL MEDICINE

## 2024-05-19 PROCEDURE — 85027 COMPLETE CBC AUTOMATED: CPT | Performed by: INTERNAL MEDICINE

## 2024-05-19 PROCEDURE — 36591 DRAW BLOOD OFF VENOUS DEVICE: CPT | Performed by: INTERNAL MEDICINE

## 2024-05-19 RX ORDER — HEPARIN SODIUM,PORCINE 10 UNIT/ML
5-10 VIAL (ML) INTRAVENOUS EVERY 24 HOURS
Status: DISCONTINUED | OUTPATIENT
Start: 2024-05-20 | End: 2024-05-20 | Stop reason: HOSPADM

## 2024-05-19 RX ORDER — HEPARIN SODIUM (PORCINE) LOCK FLUSH IV SOLN 100 UNIT/ML 100 UNIT/ML
5-10 SOLUTION INTRAVENOUS
Status: DISCONTINUED | OUTPATIENT
Start: 2024-05-20 | End: 2024-05-20 | Stop reason: HOSPADM

## 2024-05-19 RX ORDER — HEPARIN SODIUM,PORCINE 10 UNIT/ML
5-10 VIAL (ML) INTRAVENOUS
Status: DISCONTINUED | OUTPATIENT
Start: 2024-05-19 | End: 2024-05-20 | Stop reason: HOSPADM

## 2024-05-19 RX ADMIN — HYDROMORPHONE HYDROCHLORIDE 1 MG: 1 INJECTION, SOLUTION INTRAMUSCULAR; INTRAVENOUS; SUBCUTANEOUS at 10:41

## 2024-05-19 RX ADMIN — ACETAMINOPHEN 975 MG: 325 TABLET, FILM COATED ORAL at 16:39

## 2024-05-19 RX ADMIN — FLUTICASONE FUROATE AND VILANTEROL TRIFENATATE 1 PUFF: 200; 25 POWDER RESPIRATORY (INHALATION) at 08:32

## 2024-05-19 RX ADMIN — HYDROMORPHONE HYDROCHLORIDE 1 MG: 1 INJECTION, SOLUTION INTRAMUSCULAR; INTRAVENOUS; SUBCUTANEOUS at 21:08

## 2024-05-19 RX ADMIN — HYDROMORPHONE HYDROCHLORIDE 1 MG: 1 INJECTION, SOLUTION INTRAMUSCULAR; INTRAVENOUS; SUBCUTANEOUS at 01:06

## 2024-05-19 RX ADMIN — ACETAMINOPHEN 975 MG: 325 TABLET, FILM COATED ORAL at 08:32

## 2024-05-19 RX ADMIN — ASPIRIN 81 MG CHEWABLE TABLET 81 MG: 81 TABLET CHEWABLE at 08:32

## 2024-05-19 RX ADMIN — HYDROMORPHONE HYDROCHLORIDE 1 MG: 1 INJECTION, SOLUTION INTRAMUSCULAR; INTRAVENOUS; SUBCUTANEOUS at 15:04

## 2024-05-19 RX ADMIN — HYDROMORPHONE HYDROCHLORIDE 1 MG: 1 INJECTION, SOLUTION INTRAMUSCULAR; INTRAVENOUS; SUBCUTANEOUS at 12:52

## 2024-05-19 RX ADMIN — HYDROMORPHONE HYDROCHLORIDE 1 MG: 1 INJECTION, SOLUTION INTRAMUSCULAR; INTRAVENOUS; SUBCUTANEOUS at 03:16

## 2024-05-19 RX ADMIN — HYDROMORPHONE HYDROCHLORIDE 1 MG: 1 INJECTION, SOLUTION INTRAMUSCULAR; INTRAVENOUS; SUBCUTANEOUS at 19:08

## 2024-05-19 RX ADMIN — ACETAMINOPHEN 975 MG: 325 TABLET, FILM COATED ORAL at 01:05

## 2024-05-19 RX ADMIN — ACETAMINOPHEN 975 MG: 325 TABLET, FILM COATED ORAL at 23:36

## 2024-05-19 RX ADMIN — HYDROMORPHONE HYDROCHLORIDE 1 MG: 1 INJECTION, SOLUTION INTRAMUSCULAR; INTRAVENOUS; SUBCUTANEOUS at 08:32

## 2024-05-19 RX ADMIN — SENNOSIDES 8.6 MG: 8.6 TABLET, FILM COATED ORAL at 19:13

## 2024-05-19 RX ADMIN — DOCUSATE SODIUM 100 MG: 100 CAPSULE, LIQUID FILLED ORAL at 19:13

## 2024-05-19 RX ADMIN — HYDROMORPHONE HYDROCHLORIDE 1 MG: 1 INJECTION, SOLUTION INTRAMUSCULAR; INTRAVENOUS; SUBCUTANEOUS at 23:37

## 2024-05-19 RX ADMIN — HYDROXYUREA 3000 MG: 300 CAPSULE ORAL at 08:32

## 2024-05-19 RX ADMIN — ASPIRIN 81 MG CHEWABLE TABLET 81 MG: 81 TABLET CHEWABLE at 19:13

## 2024-05-19 RX ADMIN — HYDROMORPHONE HYDROCHLORIDE 1 MG: 1 INJECTION, SOLUTION INTRAMUSCULAR; INTRAVENOUS; SUBCUTANEOUS at 16:56

## 2024-05-19 RX ADMIN — HEPARIN, PORCINE (PF) 10 UNIT/ML INTRAVENOUS SYRINGE 5 ML: at 23:37

## 2024-05-19 ASSESSMENT — ACTIVITIES OF DAILY LIVING (ADL)
ADLS_ACUITY_SCORE: 21

## 2024-05-19 NOTE — PLAN OF CARE
Goal Outcome Evaluation:      Plan of Care Reviewed With: patient    Overall Patient Progress: improving    Outcome Evaluation: Pt reported that her pain is getting better today & resting comfortably between cares.    VS: Temp: 98.2  F (36.8  C) Temp src: Oral BP: 124/74 Pulse: 100   Resp: 18 SpO2: 92 % O2 Device: None (Room air)      O2: SpO2 > 90% and stable on RA. LS clear and equal bilaterally. Denies chest pain and SOB.    Output: Voids spontaneously without difficulty to bathroom.   Last BM: 5/18/2024 per. BS active / passing flatus.    Activity: Up ad janett, steady gait.   Skin: Intact   Pain: Pain managed with scheduled Tylenol & PRN IV Dilaudid & pt calls for it around the clock when awake. Other pain medicine was offered to pt, but pt declined.    CMS: Intact, AOx4. Denies numbness and tingling.   Dressing: None   Diet: Regular diet. Denies nausea/vomiting.    LDA: R PIV SL. L upper chest Port SL.   Equipment: IV pole, personal belongings,    Plan: Contact precautions maintained. Continue with plan of care &pain management. Call light within reach, pt able to make needs known.

## 2024-05-19 NOTE — PROGRESS NOTES
[facility]    Medicine Progress Note - Hospitalist Service, GOLD TEAM 16    Date of Admission:  5/16/2024    Assessment & Plan   Jennifer Cervantes is a 24 year old female admitted on 5/16/2024.  She has a history of sickle cell anemia, stroke leading to cognitive delays and right upper extremity hemiparesis, iron overload due to chronic transfusions, past acute chest syndrome anxiety, depression and moderate persistent asthma who presented to the ED on 5/16 with bilateral flank and hip pain similar to past sickle cell crisis.      Sickle cell anemia   Sickle cell vaso-occlusive pain crisis    Chronic pain   ---   Primary hematologist is Dr. Duncan.   ---   Presented with bilateral flank and hip pain similar to prior sickle cell pain crisis.   ---   In the ED found to have Hgb 6.6 g, WBC 16.6, CMP with elevated total bili otherwise unremarkable.  LA 0.6.  No hypoxia.   ---   Denied respiratory symptoms.   ---   ED staff discussed pt's Hgb with Dr. Duncan who recommended 1 unit pRBC transfusion which she received in the ED  ---   Pain still not optimally controlled  ---   Hgb was 7.4 g on 5/17  ---   CT AP w/o contrast obtained for eval of abd pain revealed minimal subchondral sclerosis in the right femoral head which could represent early avascular necrosis but no acute intra-abd or pelvic pathology  ---   Abd US negative for portal vein thrombosis   ---   UA grossly negative for infection  ---   Pain management:               -Patient prefers IV dilaudid PRN doses rather than PCA (of note, PCA dilaudid dosing in care plan does not specify dose of dilaudid and Toradol dosing unclear; should be revisited by hematology team.)   -IV dilaudid 1 mg every 2 hours PRN   -Toradol 30 mg every 6 hours    ---   S/p LR IVF hydration  ---   Bowel regimen with docusate and senna   ---   Continue PTA hydrozyurea 3000 mg daily   ---   Methocarbamol 750 mg QID PRN   ---   Zofran 8 mg every  8 hour PRN  ---   Omeprazole 20 mg dialy      Hemochromatosis   Iron overload   ---   Interval FerriScan while on chelation 5/3/24 with marked hepatic hemachromatosis, iron concentrations which have increased since prior scan 9/2023.   ---   Continue Jadenu 1440 mg qpm     Abdominal pain   Hx of cholecystectomy   ---   Presented with cramping abd discomfort.    ---   Patient reports longstanding history of intermittent abdominal pain, status post cholecystectomy.  Following cholecystectomy she had improvement in pain however intermittent abdominal pain developed again over the last several weeks.    ---   She notes she was scheduled for an EGD, however this was postponed for November 2024.    ---   In the ED lipase wnl. Tbili elevated at 2.3.   ---   Given abdominal pain and sickle cell, Dr. Duncan recommended evaluation for portal vein thrombosis.   ---   US abd negative for portal vein thrombosis   ---   CT AP w/o contrast obtained for eval of abd pain revealed minimal subchondral sclerosis in the right femoral head which could represent early avascular necrosis but no acute intra-abd or pelvic pathology  ---   Abdominal pain resolved     Hx of CVA   ---   ASA 81mg BID      Anxiety   Depression   ---   Not currently on any medications.      Moderate persistent asthma  ---   With recent possible asthma exacerbation requring hospital admission due to acute hypoxic respiratory failure.  CT chest at that time (4/23/2024) with mild dependent atelectasis bilaterally with no other acute abnormalities. Respiratory status improved with treatment of asthma exacerbation patient was on room air at time of discharge.  She was started on prednisone to complete 7-day course of 40 mg daily.  ---   No pulmonary symptoms since admission   ---   On RA   ---   Continue PTA albuterol PRN   ---   Continue albuterol nebs as needed  ---   Continue PTA Symbicort BID  ---   Continue cetirizine 10 mg daily             Cardiac Monitoring:  None  Code Status:  Full code  Diet: Regular Diet Adult    DVT Prophylaxis: Pneumatic Compression Devices  Lopez Catheter: Not present  Lines: PRESENT  Port A Cath Single 04/21/21 Left Chest wall-Site Assessment: WDL   Disposition Plan    Possible discharge later this afternoon vs tomorrow morning            Ramu Christian MD  Hospitalist Service, GOLD TEAM 16  [facility]  Securely message with [employer] (more info)  Text page via [employer] Paging/Directory   See signed in provider for up to date coverage information  ______________________________________________________________________    Interval History   Pain not optimally controlled  No other active issues  Uneventful night    Physical Exam   Vital Signs: Temp: 98  F (36.7  C) Temp src: Oral BP: 120/64 Pulse: 91   Resp: 16 SpO2: 92 % O2 Device: None (Room air)    Weight: 0 lbs 0 oz  General: aao x 3, NAD.  HEENT:  NC/AT, PERRL, EOMI, neck supple, no thyromegaly, op clear, mmm.  CVS:  NL s 1 and s2, no m/r/g.  Lungs:  CTA B/L.   Abd:  Soft, + bs, NT, no rebound or gaurding, no fluid shift.  Ext:  No c/c.  Lymph:  No edema.  Neuro: Upper extremity weakness noted  Musculoskeletal: No calf tenderness to palpation.    Skin:  No rash.  Psychiatry:  Mood and affect appropriate.      Data         Imaging results reviewed over the past 24 hrs:   No results found for this or any previous visit (from the past 24 hour(s)).

## 2024-05-19 NOTE — PROGRESS NOTES
VS: /64 (BP Location: Left arm)   Pulse 91   Temp 98  F (36.7  C) (Oral)   Resp 16   LMP  (LMP Unknown)   SpO2 92%            O2: >90% on room air. Denies SOB and CP, no cough present. Lung sounds clear.   Output: Voiding spontaneously and without difficulty. Pt is menstruating.   Last BM: 05/18/2024 per pt report, declined bowel meds this am   Activity: Up independently   Skin: WDL   Pain: Managed with IV dilaudid    Neuro: A & O x4, denies N/T   Dressing: N/A   Diet: Regular   LDA: L) chest port SL,  L) PIV SL   Equipment: Personal belongings, call light, IV pump/pole   Plan: Her pain is getting more controlled and anticipates discharging brittany     Additional Info:

## 2024-05-19 NOTE — PLAN OF CARE
Goal Outcome Evaluation:      Plan of Care Reviewed With: patient    Overall Patient Progress: improvingOverall Patient Progress: improving    Outcome Evaluation: No c/o nausea. Resting between cares and pain meds. States she feels readyto go home today or brittany. Can make her needs known.

## 2024-05-19 NOTE — PLAN OF CARE
Patient here for sickle cell crisis    Goal Outcome Evaluation:      Plan of Care Reviewed With: patient    Overall Patient Progress: improving    Outcome Evaluation: Pain managed well, stating her pain has gone from being a 9 ot a 5-7      VS: Temp: 97.7  F (36.5  C) Temp src: Oral BP: 126/59 Pulse: 83   Resp: 20 SpO2: 90 % O2 Device: None (Room air)      O2: RA   Output: Using bathroom independently   Last BM: 5/18   Activity: Independent   Skin: No issues   Pain: Moderate to high, using dilaudid q2h, last dose at 2110.   Neuro: A&O x4, limited use of RUE due to prior CVA   Labs: None this shift   Diet: Regular   IV: L PIV SL, port L chest SL   LDA:  No other LDA   Plan: Continue pain management, being followed by hematology   Additional Info: Defarasirox not available, called pharmacy and they said it is non formulary so it would cost pt $4k to have it filled here. Talked to patient and she said she didn't expect to be admitted so she didn't bring it and is expecting to go home tomorrow anyway. If for some reason she does not, she will have a family member bring it in.

## 2024-05-20 ENCOUNTER — PRE VISIT (OUTPATIENT)
Dept: GASTROENTEROLOGY | Facility: CLINIC | Age: 24
End: 2024-05-20

## 2024-05-20 ENCOUNTER — PATIENT OUTREACH (OUTPATIENT)
Dept: ONCOLOGY | Facility: CLINIC | Age: 24
End: 2024-05-20

## 2024-05-20 ENCOUNTER — TELEPHONE (OUTPATIENT)
Dept: ONCOLOGY | Facility: CLINIC | Age: 24
End: 2024-05-20

## 2024-05-20 VITALS
TEMPERATURE: 97.6 F | HEART RATE: 81 BPM | DIASTOLIC BLOOD PRESSURE: 76 MMHG | RESPIRATION RATE: 16 BRPM | OXYGEN SATURATION: 94 % | SYSTOLIC BLOOD PRESSURE: 125 MMHG

## 2024-05-20 DIAGNOSIS — D57.00 SICKLE CELL PAIN CRISIS (H): ICD-10-CM

## 2024-05-20 PROCEDURE — 250N000013 HC RX MED GY IP 250 OP 250 PS 637: Performed by: PHYSICIAN ASSISTANT

## 2024-05-20 PROCEDURE — 250N000011 HC RX IP 250 OP 636: Performed by: INTERNAL MEDICINE

## 2024-05-20 PROCEDURE — 99239 HOSP IP/OBS DSCHRG MGMT >30: CPT | Performed by: INTERNAL MEDICINE

## 2024-05-20 PROCEDURE — 250N000011 HC RX IP 250 OP 636: Performed by: PHYSICIAN ASSISTANT

## 2024-05-20 RX ORDER — OXYCODONE HYDROCHLORIDE 10 MG/1
10 TABLET ORAL EVERY 4 HOURS PRN
Qty: 20 TABLET | Refills: 0 | Status: SHIPPED | OUTPATIENT
Start: 2024-05-20 | End: 2024-05-28

## 2024-05-20 RX ADMIN — FLUTICASONE FUROATE AND VILANTEROL TRIFENATATE 1 PUFF: 200; 25 POWDER RESPIRATORY (INHALATION) at 08:26

## 2024-05-20 RX ADMIN — HYDROMORPHONE HYDROCHLORIDE 1 MG: 1 INJECTION, SOLUTION INTRAMUSCULAR; INTRAVENOUS; SUBCUTANEOUS at 08:27

## 2024-05-20 RX ADMIN — DOCUSATE SODIUM 100 MG: 100 CAPSULE, LIQUID FILLED ORAL at 08:26

## 2024-05-20 RX ADMIN — HYDROMORPHONE HYDROCHLORIDE 1 MG: 1 INJECTION, SOLUTION INTRAMUSCULAR; INTRAVENOUS; SUBCUTANEOUS at 03:46

## 2024-05-20 RX ADMIN — HYDROMORPHONE HYDROCHLORIDE 1 MG: 1 INJECTION, SOLUTION INTRAMUSCULAR; INTRAVENOUS; SUBCUTANEOUS at 05:53

## 2024-05-20 RX ADMIN — HYDROMORPHONE HYDROCHLORIDE 1 MG: 1 INJECTION, SOLUTION INTRAMUSCULAR; INTRAVENOUS; SUBCUTANEOUS at 01:42

## 2024-05-20 RX ADMIN — HEPARIN, PORCINE (PF) 10 UNIT/ML INTRAVENOUS SYRINGE 5 ML: at 01:42

## 2024-05-20 RX ADMIN — ACETAMINOPHEN 975 MG: 325 TABLET, FILM COATED ORAL at 08:26

## 2024-05-20 RX ADMIN — HYDROMORPHONE HYDROCHLORIDE 1 MG: 1 INJECTION, SOLUTION INTRAMUSCULAR; INTRAVENOUS; SUBCUTANEOUS at 10:39

## 2024-05-20 RX ADMIN — HYDROXYUREA 3000 MG: 300 CAPSULE ORAL at 08:30

## 2024-05-20 RX ADMIN — ASPIRIN 81 MG CHEWABLE TABLET 81 MG: 81 TABLET CHEWABLE at 08:26

## 2024-05-20 RX ADMIN — HYDROMORPHONE HYDROCHLORIDE 1 MG: 1 INJECTION, SOLUTION INTRAMUSCULAR; INTRAVENOUS; SUBCUTANEOUS at 12:36

## 2024-05-20 RX ADMIN — HEPARIN, PORCINE (PF) 10 UNIT/ML INTRAVENOUS SYRINGE 5 ML: at 03:46

## 2024-05-20 RX ADMIN — SENNOSIDES 8.6 MG: 8.6 TABLET, FILM COATED ORAL at 08:26

## 2024-05-20 RX ADMIN — HEPARIN, PORCINE (PF) 10 UNIT/ML INTRAVENOUS SYRINGE 5 ML: at 05:53

## 2024-05-20 ASSESSMENT — ACTIVITIES OF DAILY LIVING (ADL)
ADLS_ACUITY_SCORE: 21

## 2024-05-20 NOTE — PROGRESS NOTES
A/Ox's 4. Pt rated pain as tolerable. IV Dilaudid and Tylenol given for pain control. Pt states her pain is improving and might be at the point where she may be able to go home today. CMS to baseline. Pt has Right sided weakness baseline. Tolerated regular diet. Denied any nausea, CP, SOB, lightheadedness or dizziness. Voiding without pain or difficulty. Pt up ind Resting in bed at this time with call light in reach. Able to make needs known.

## 2024-05-20 NOTE — PROGRESS NOTES
"This RN de-accessed the portacath per the patient's guidance.  This RN was going to get another RN to de-access it, but the patient wanted to show this RN how to do it.  She stated, \"I do this all the time at home, I can show you.\"    This RN got the heparin from the pyxis and supplies for the patient.  The patient showed this RN how to remove the needle, but as the needle came out this RN forgot to give the heparin.  The needle was already out.  A gauze and small tegaderm were applied.  The patient repeatedly said, \"its ok, I was heparinized earlier, I go back to the lab on Friday, its all ok.\"  This RN apologized, but the patient stated  \"it was all ok and not to worry.\"  She said, \"you did good!\"    "

## 2024-05-20 NOTE — DISCHARGE SUMMARY
[facility]  Hospitalist Discharge Summary      Date of Admission:  5/16/2024  Date of Discharge:  5/20/2024  Discharging Provider: Ramu Christian MD  Discharge Service: Hospitalist Service, GOLD TEAM 16    Discharge Diagnoses   Sickle cell veno-occlusive pain crisis    Follow-ups Needed After Discharge   Follow-up Appointments    Follow Up and recommended labs and tests     Follow up with primary care provider, Suraj Case, within 7 days      Discharge Disposition   Discharged to home  Condition at discharge: Stable    Hospital Course   Jennifer Cervantes is a 23 yo female w/ h/o sickle cell anemia, stroke leading to cognitive delays and right upper extremity hemiparesis, iron overload due to chronic transfusions, past acute chest syndrome, anxiety, depression and moderate persistent asthma who presented to the ED on 5/16 with bilateral flank and hip pain similar to past sickle cell crisis.      Sickle cell anemia   Sickle cell vaso-occlusive pain crisis    Chronic pain   ---   Primary hematologist is Dr. Duncan.   ---   Presented with bilateral flank and hip pain similar to prior sickle cell pain crisis.   ---   In the ED found to have Hgb 6.6 g, WBC 16.6, CMP with elevated total bili otherwise unremarkable.  LA 0.6.  No hypoxia.   ---   Denied respiratory symptoms.   ---   CT AP w/o contrast at admit for eval of abd pain revealed minimal subchondral sclerosis in the right femoral head which could represent early avascular necrosis but no acute intra-abd or pelvic pathology  ---   Abd US negative for portal vein thrombosis   ---   UA grossly negative for infection  ---   S/p 1 unit PRBC transfusion in the ED per Dr. Duncan rec  ---   Hgb dropped down to 6.8 g on 5/19 but pt remained asymptomatic  ---   Treated w/ IVF, IV dilaudid 1 mg every 2 hours PRN (pt declined PCA), Toradol 30 mg every 6 hours, tylenol and Methocarbamol   ---   She has also received bowel  regimen meds  ---   She was kept PTA hydrozyurea 3000 mg daily   ---   Pain crisis has resolved   ---   Pt is stable for d/c to home today w/ f/u at PCP and hematology clinic asap     Hemochromatosis   Iron overload   ---   Interval FerriScan while on chelation 5/3/24 with marked hepatic hemachromatosis, iron concentrations which have increased since prior scan 9/2023.   ---   Continue Jadenu 1440 mg qpm     Abdominal pain   Hx of cholecystectomy   ---   Presented with cramping abd discomfort.    ---   Patient reports longstanding h/o intermittent abd pain, s/p cholecystectomy.    ---   Following cholecystectomy she had improvement in pain however intermittent abd pain developed again over the last several weeks PTA.    ---   She was scheduled for an EGD, however this was postponed for November 2024.    ---   In the ED lipase wnl, Tbili elevated at 2.3.   ---   US abd negative for portal vein thrombosis   ---   CT AP w/o contrast obtained for eval of abd pain revealed minimal subchondral sclerosis in the right femoral head which could represent early avascular necrosis but no acute intra-abd or pelvic pathology  ---   Abdominal pain resolved   ---   Received pain meds as described above    Hx of CVA   ---   ASA 81mg BID      Anxiety   Depression   ---   Not currently on any medications.      Moderate persistent asthma  ---   Recent h/o asthma exacerbation requring hospital admission due to acute hypoxic respiratory failure.  CT chest at that time (4/23/2024) with mild dependent atelectasis bilaterally with no other acute abnormalities.  Respiratory status improved with treatment of asthma exacerbation.  She was on room air at time of discharge.  She completed prednisone 40 mg po daily x 7 days  ---   No pulmonary symptoms this admission   ---   On RA throughout this hospitalization  ---   Continue PTA albuterol PRN   ---   Continue albuterol nebs as needed  ---   Continue PTA Symbicort BID  ---   Continue cetirizine  10 mg daily        Consultations This Hospital Stay   HEMATOLOGY ADULT IP CONSULT  CARE MANAGEMENT / SOCIAL WORK IP CONSULT    Code Status   Full Code    Time Spent on this Encounter   I, Ramu Christian MD, personally saw the patient today and spent greater than 30 minutes discharging this patient.       Ramu Christian MD  [facility] MED SURG ORTHOPEDIC  [address] [address]  Phone: [phone]  Fax: [phone]  ______________________________________________________________________    Physical Exam   Vital Signs: Temp: 97.6  F (36.4  C) Temp src: Oral BP: 125/76 Pulse: 81   Resp: 16 SpO2: 94 % O2 Device: None (Room air)    Weight: 0 lbs 0 oz  General: aao x 3, NAD.  HEENT:  NC/AT, PERRL, EOMI, neck supple, no thyromegaly, op clear, mmm.  CVS:  NL s 1 and s2, no m/r/g.  Lungs:  CTA B/L.   Abd:  Soft, + bs, NT, no rebound or gaurding, no fluid shift.  Ext:  No c/c.  Lymph:  No edema.  Neuro:  Nonfocal.  Musculoskeletal: No calf tenderness to palpation.    Skin:  No rash.  Psychiatry:  Mood and affect appropriate.         Primary Care Physician   Suraj Case    Discharge Orders      Reason for your hospital stay    Sickle cell veno-occlusive pain crisis     Activity    Your activity upon discharge: activity as tolerated     Follow Up and recommended labs and tests    Follow up with primary care provider, Suraj Case, within 7 days     Diet    Follow this diet upon discharge:   Regular Diet Adult       Discharge Medications   Current Discharge Medication List        CONTINUE these medications which have NOT CHANGED    Details   aspirin (ASA) 81 MG chewable tablet Take 1 tablet (81 mg) by mouth 2 times daily  Qty: 60 tablet, Refills: 11    Associated Diagnoses: Superficial venous thrombosis of arm, right      deferasirox (JADENU) 360 MG tablet Take 4 tablets (1,440 mg) by mouth every evening  Qty: 120 tablet, Refills: 4    Associated Diagnoses: Iron overload due to  repeated red blood cell transfusions      Hydroxyurea 1000 MG TABS Take 3,000 mg by mouth daily  Qty: 90 tablet, Refills: 3    Associated Diagnoses: 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 ibuprofen (ADVIL/MOTRIN) 800 MG tablet Take 800 mg by mouth every 8 hours as needed for moderate pain      methocarbamol (ROBAXIN) 750 MG tablet Take 1 tablet (750 mg) by mouth 4 times daily as needed for muscle spasms (during sickle pain crises. Okay to take scheduled while in pain)  Qty: 60 tablet, Refills: 1    Associated Diagnoses: Sickle cell pain crisis (H)      omeprazole (PRILOSEC) 20 MG DR capsule Take 1 capsule (20 mg) by mouth daily  Qty: 30 capsule, Refills: 0    Associated Diagnoses: Gastroesophageal reflux disease, unspecified whether esophagitis present      ondansetron (ZOFRAN) 8 MG tablet Take 1 tablet (8 mg) by mouth every 8 hours as needed for nausea  Qty: 30 tablet, Refills: 1    Associated Diagnoses: Other acute gastritis without hemorrhage      oxyCODONE IR (ROXICODONE) 10 MG tablet Take 1 tablet (10 mg) by mouth every 4 hours as needed for severe pain or breakthrough pain Goal 4 per day. Max 6 per day.  Qty: 20 tablet, Refills: 0    Associated Diagnoses: Sickle cell pain crisis (H)      acetaminophen (TYLENOL) 325 MG tablet Take 3 tablets (975 mg) by mouth every 8 hours  Qty: 270 tablet, Refills: 0    Associated Diagnoses: Chronic bilateral back pain, unspecified back location      albuterol (PROAIR HFA/PROVENTIL HFA/VENTOLIN HFA) 108 (90 Base) MCG/ACT inhaler Inhale 2 puffs into the lungs every 6 hours as needed for shortness of breath or wheezing  Qty: 8.5 g, Refills: 3    Comments: Pharmacy may dispense brand covered by insurance (Proair, or proventil or ventolin or generic albuterol inhaler)  Associated Diagnoses: Asthmatic bronchitis without complication, unspecified asthma severity, unspecified whether persistent      albuterol (PROVENTIL) (2.5 MG/3ML) 0.083% neb solution Take 2 vials (5 mg) by  nebulization every 6 hours as needed for shortness of breath or wheezing  Qty: 90 mL, Refills: 3    Associated Diagnoses: Asthmatic bronchitis without complication, unspecified asthma severity, unspecified whether persistent      budesonide-formoterol (SYMBICORT) 160-4.5 MCG/ACT Inhaler Inhale 2 puffs twice daily plus 1-2 puffs as needed. May use up to 12 puffs per day.  Qty: 20.4 g, Refills: 11    Comments: Please dispense #2 10.2g inhalers for a 30-day supply per [place] 2021 guidelines. If reject arises, enter DUR codes: HD / M0 / 1G. Note: MA prefers brand Symbicort.  Associated Diagnoses: Moderate persistent asthma, unspecified whether complicated      EPINEPHrine (ANY BX GENERIC EQUIV) 0.3 MG/0.3ML injection 2-pack Inject 0.3 mLs (0.3 mg) into the muscle as needed for anaphylaxis  Qty: 1 each, Refills: 1    Associated Diagnoses: Anaphylaxis, sequela      !! ibuprofen (ADVIL/MOTRIN) 600 MG tablet Take 600 mg by mouth every 6 hours as needed for moderate pain Using rarely, 2x/week at most      melatonin 5 MG tablet Take 1 tablet (5 mg) by mouth nightly as needed for sleep  Qty: 30 tablet, Refills: 1    Associated Diagnoses: Primary insomnia      !! naloxone (NARCAN) 4 MG/0.1ML nasal spray Spray 1 spray (4 mg) into one nostril alternating nostrils as needed for opioid reversal every 2-3 minutes until assistance arrives  Qty: 0.2 mL, Refills: 0      !! naloxone (NARCAN) 4 MG/0.1ML nasal spray Spray 4 mg into one nostril alternating nostrils as needed for opioid reversal every 2-3 minutes until assistance arrives       !! - Potential duplicate medications found. Please discuss with provider.        Allergies   Allergies   Allergen Reactions    Contrast Dye      Hives and breathing issues    Fish-Derived Products Hives    Seafood Hives    Adhesive Tape Hives     Primipore dressing causes hives    Gadolinium     Iodinated Contrast Media

## 2024-05-20 NOTE — PROGRESS NOTES
VS: /76   Pulse 81   Temp 97.6  F (36.4  C)   Resp 16   LMP  (LMP Unknown)   SpO2 94%     O2: RA   Output: Voids in toilet   Last BM: 5/18   Activity: indep   Skin: intact   Pain: In constant pain    CMS: Denies numbness/tingling   Dressing: none   Diet: reg   LDA: Portacath in L chest area   Equipment: none   Plan: Discharge home today   Additional Info:         Jennifer walked off the unit and this RN escorted her down to the front entrance.   A transport taxi picked her up.  She had her discharge papers and understood them.  All questions and concerns were addressed.

## 2024-05-20 NOTE — PROGRESS NOTES
Care Management Discharge Note    Discharge Date: 05/20/2024       Discharge Disposition: Home    Discharge Services: PCA (Mother is PCA)    Discharge DME: None    Discharge Transportation: family or friend will provide, health plan transportation    Private pay costs discussed: Not applicable    Does the patient's insurance plan have a 3 day qualifying hospital stay waiver?  No    PAS Confirmation Code:    Patient/family educated on Medicare website which has current facility and service quality ratings: no    Handoff Referral Completed: Yes    Additional Information:  Plan for patient to discharge to home today. Sickle Cell Crisis improved. No home care needs indicated. RNCC available as needed.      Mali Garcia RN, BSN  Care Coordinator, [facility] Orth[facility]  Phone [phone]  Pager [phone]

## 2024-05-20 NOTE — TELEPHONE ENCOUNTER
Jennifer called, is likely being discharged from hospital today and wanted to speak to care team about discharge plan.    This writer informed will send message to care team.

## 2024-05-21 ENCOUNTER — NURSE TRIAGE (OUTPATIENT)
Dept: ONCOLOGY | Facility: CLINIC | Age: 24
End: 2024-05-21
Payer: COMMERCIAL

## 2024-05-21 ENCOUNTER — INFUSION THERAPY VISIT (OUTPATIENT)
Dept: TRANSPLANT | Facility: CLINIC | Age: 24
End: 2024-05-21
Attending: PEDIATRICS
Payer: COMMERCIAL

## 2024-05-21 ENCOUNTER — PATIENT OUTREACH (OUTPATIENT)
Dept: CARE COORDINATION | Facility: CLINIC | Age: 24
End: 2024-05-21

## 2024-05-21 ENCOUNTER — PATIENT OUTREACH (OUTPATIENT)
Dept: CARE COORDINATION | Facility: CLINIC | Age: 24
End: 2024-05-21
Payer: COMMERCIAL

## 2024-05-21 VITALS
DIASTOLIC BLOOD PRESSURE: 81 MMHG | TEMPERATURE: 98.6 F | SYSTOLIC BLOOD PRESSURE: 118 MMHG | HEART RATE: 96 BPM | RESPIRATION RATE: 16 BRPM | OXYGEN SATURATION: 97 %

## 2024-05-21 DIAGNOSIS — G81.10 SPASTIC HEMIPLEGIA, UNSPECIFIED ETIOLOGY, UNSPECIFIED LATERALITY (H): ICD-10-CM

## 2024-05-21 DIAGNOSIS — D57.00 SICKLE CELL PAIN CRISIS (H): Primary | ICD-10-CM

## 2024-05-21 PROCEDURE — 250N000011 HC RX IP 250 OP 636: Performed by: PEDIATRICS

## 2024-05-21 PROCEDURE — 96374 THER/PROPH/DIAG INJ IV PUSH: CPT

## 2024-05-21 PROCEDURE — 96376 TX/PRO/DX INJ SAME DRUG ADON: CPT

## 2024-05-21 PROCEDURE — 96375 TX/PRO/DX INJ NEW DRUG ADDON: CPT

## 2024-05-21 PROCEDURE — 258N000003 HC RX IP 258 OP 636: Performed by: PEDIATRICS

## 2024-05-21 PROCEDURE — 96361 HYDRATE IV INFUSION ADD-ON: CPT

## 2024-05-21 PROCEDURE — 250N000013 HC RX MED GY IP 250 OP 250 PS 637: Performed by: PEDIATRICS

## 2024-05-21 RX ORDER — HEPARIN SODIUM (PORCINE) LOCK FLUSH IV SOLN 100 UNIT/ML 100 UNIT/ML
5 SOLUTION INTRAVENOUS
Status: CANCELLED | OUTPATIENT
Start: 2024-07-01

## 2024-05-21 RX ORDER — HEPARIN SODIUM,PORCINE 10 UNIT/ML
5 VIAL (ML) INTRAVENOUS
Status: CANCELLED | OUTPATIENT
Start: 2024-07-01

## 2024-05-21 RX ORDER — ONDANSETRON 2 MG/ML
8 INJECTION INTRAMUSCULAR; INTRAVENOUS EVERY 6 HOURS PRN
Status: CANCELLED
Start: 2024-07-01

## 2024-05-21 RX ORDER — KETOROLAC TROMETHAMINE 30 MG/ML
30 INJECTION, SOLUTION INTRAMUSCULAR; INTRAVENOUS ONCE
Status: COMPLETED | OUTPATIENT
Start: 2024-05-21 | End: 2024-05-21

## 2024-05-21 RX ORDER — DIPHENHYDRAMINE HCL 25 MG
50 CAPSULE ORAL
Status: CANCELLED
Start: 2024-07-01

## 2024-05-21 RX ORDER — ONDANSETRON 4 MG/1
8 TABLET, FILM COATED ORAL
Status: CANCELLED
Start: 2024-07-01

## 2024-05-21 RX ORDER — ONDANSETRON 2 MG/ML
8 INJECTION INTRAMUSCULAR; INTRAVENOUS EVERY 6 HOURS PRN
Status: DISCONTINUED | OUTPATIENT
Start: 2024-05-21 | End: 2024-05-21 | Stop reason: HOSPADM

## 2024-05-21 RX ORDER — KETOROLAC TROMETHAMINE 30 MG/ML
30 INJECTION, SOLUTION INTRAMUSCULAR; INTRAVENOUS ONCE
Status: CANCELLED
Start: 2024-07-01 | End: 2024-07-01

## 2024-05-21 RX ORDER — DIPHENHYDRAMINE HCL 25 MG
50 CAPSULE ORAL
Status: COMPLETED | OUTPATIENT
Start: 2024-05-21 | End: 2024-05-21

## 2024-05-21 RX ADMIN — HYDROMORPHONE HYDROCHLORIDE 1 MG: 1 INJECTION, SOLUTION INTRAMUSCULAR; INTRAVENOUS; SUBCUTANEOUS at 11:56

## 2024-05-21 RX ADMIN — HYDROMORPHONE HYDROCHLORIDE 1 MG: 1 INJECTION, SOLUTION INTRAMUSCULAR; INTRAVENOUS; SUBCUTANEOUS at 09:57

## 2024-05-21 RX ADMIN — SODIUM CHLORIDE, POTASSIUM CHLORIDE, SODIUM LACTATE AND CALCIUM CHLORIDE 1000 ML: 600; 310; 30; 20 INJECTION, SOLUTION INTRAVENOUS at 09:52

## 2024-05-21 RX ADMIN — DIPHENHYDRAMINE HYDROCHLORIDE 50 MG: 25 CAPSULE ORAL at 09:48

## 2024-05-21 RX ADMIN — HYDROMORPHONE HYDROCHLORIDE 1 MG: 1 INJECTION, SOLUTION INTRAMUSCULAR; INTRAVENOUS; SUBCUTANEOUS at 11:01

## 2024-05-21 RX ADMIN — ONDANSETRON 8 MG: 2 INJECTION INTRAMUSCULAR; INTRAVENOUS at 09:49

## 2024-05-21 RX ADMIN — KETOROLAC TROMETHAMINE 30 MG: 30 INJECTION, SOLUTION INTRAMUSCULAR; INTRAVENOUS at 09:52

## 2024-05-21 ASSESSMENT — PAIN SCALES - GENERAL: PAINLEVEL: EXTREME PAIN (9)

## 2024-05-21 NOTE — PROGRESS NOTES
Infusion Nursing Note:  Jennifer Cervantes presents today for IVF and pain meds.    Patient seen by provider today: No   present during visit today: Not Applicable.    Note: No labs/no provider visit. Other than upper and lower extremity pain, patient assessment benign. Given 1L LR over 2 hours, 50mg PO Benadryl, 30mg IVP Toradol, 8mg IVP Zofran, and 1mg IVP Dilaudid Q1HR for max of 3 doses. Patient reported improvement of pain to 6/10 upon discharge.    Intravenous Access:  Implanted Port.  +BR noted pre and post infusion  De-accessed prior to discharge    Treatment Conditions:  Met treatment protocol.      Post Infusion Assessment:  Patient tolerated infusion without incident.       Discharge Plan:   Patient discharged in stable condition accompanied by: self.  Departure Mode: Ambulatory.      Allison Wiggins RN

## 2024-05-21 NOTE — TELEPHONE ENCOUNTER
Available time for 0930 w/BMT. Call to pt to confirm she is able to make it. Pt confirmed she is able to make 0930 appt. CCOD notified to add pt to schedule.

## 2024-05-21 NOTE — TELEPHONE ENCOUNTER
Oncology Nurse Triage - Sickle Cell Pain Crisis:    Situation: Jennifer  calling about Sickle Cell Pain Crisis, requesting to be added on for IV fluids and pain medicine    Background:     Patient's last infusion was 05/20/24  Last clinic visit date:05/03/24 w/Patricia Mantilla CNP  Does patient have active treatment plan?  Yes      Assessment of Symptoms:  Onset/Duration of symptoms: 1 day    Is it typical sickle cell pain? Yes   Location: Both arms  Character: Sharp           Intensity: 8/10    Any radiation of pain, numbness, tingling, weakness, warmth, swelling, discoloration of arms or legs?No     Fever?No  (if yes max temperature recorded in last 24 hours):      Chest Pain Present: No     Shortness of breath: No     Other home therapies tried: HEAT/HEATING PAD and WARM BATH     Last home medication taken and when: 0615 ibuprofen    Any Refills Needed?: No     Does patient have transportation & length of time to get to clinic: States she might be able to find transportation if something opens up quickly; however, will need assistance with transportation otherwise.         Recommendations:     [id number] Secure message to Patricia Mantilla for approval to add on pt to wait list    0733 Approved by Patricia Mantilla for IVF/Pain meds today    If you do not hear from the infusion center by 2pm then you will not be able to get in for an infusion today. If symptoms worsen while waiting for call back, and/or you experience fever, chills, SOB, chest pain, cough, n/v, dizziness, numbness, swelling, discoloration of extremities, then seek emergency evaluation in Emergency Department.     Please note, if you are late for your appt, you risk losing your infusion appt as it may delay another patient's infusion who arrived on time.

## 2024-05-21 NOTE — PROGRESS NOTES
Clinic Care Coordination Contact  Care Coordination Clinician Chart Review    Situation: Patient chart reviewed by care coordinator.    Background: Clinic Care Coordination Referral received from inpatient care team for transition handoff communication following hospital admission.    Assessment: Upon chart review, patient is not a candidate for Primary Care Clinic Care Coordination enrollment due to reason stated below:  Primary Care Clinic Care Coordination will defer follow-up outreach to Specialty Clinic Care Coordination team who are already closely following patient.- Oncology CC and Oncology SW     Plan/Recommendations: Clinic Care Coordination Referral/order cancelled. RN/SW CC will perform no further monitoring/outreaches at this time and will remain available as needed. If new needs arise, a new Care Coordination Referral may be placed. RN will also not do TCM outreach at this time as oncology has already performed outreach. Pt has not seen PCP since 2022, seen in [facility] since May 2023. RN will route to clinic coordinators to help patient schedule with PCP.     TAD ESQUIVEL RN on 5/22/2024 at 9:14 AM

## 2024-05-21 NOTE — PROGRESS NOTES
Social Work - Transportation  [facility]    Data/Intervention:  Patient Name: Jennifer Cervantes Goes By: Jennifer    /Age: 2000 (24 year old)    Referral From: Patient  Reason for Referral:  support requested for patient transportation needs for today's appointment.  Assessment:  called [employer] to arrange ride through patient's insurance. [employer] arranged one way ride home with [employer]. Patient will need to call [phone] when ready for return ride home.  Plan: Patient is aware of the transportation plan.  available to assist with any other needs.    CARLOS Chavez,[facility]  Hematology/Oncology Social Worker  Phone:[phone] Pager: [phone]

## 2024-05-22 ENCOUNTER — HOSPITAL ENCOUNTER (EMERGENCY)
Facility: CLINIC | Age: 24
Discharge: HOME | End: 2024-05-22
Attending: EMERGENCY MEDICINE | Admitting: EMERGENCY MEDICINE
Payer: COMMERCIAL

## 2024-05-22 VITALS
HEIGHT: 64 IN | HEART RATE: 92 BPM | DIASTOLIC BLOOD PRESSURE: 75 MMHG | BODY MASS INDEX: 25.61 KG/M2 | OXYGEN SATURATION: 98 % | TEMPERATURE: 98.3 F | WEIGHT: 150 LBS | SYSTOLIC BLOOD PRESSURE: 120 MMHG | RESPIRATION RATE: 16 BRPM

## 2024-05-22 DIAGNOSIS — D57.00 SICKLE CELL PAIN CRISIS (H): ICD-10-CM

## 2024-05-22 LAB
ALBUMIN SERPL BCG-MCNC: 4.4 G/DL (ref 3.5–5.2)
ALP SERPL-CCNC: 65 U/L (ref 40–150)
ALT SERPL W P-5'-P-CCNC: 81 U/L (ref 0–50)
ANION GAP SERPL CALCULATED.3IONS-SCNC: 12 MMOL/L (ref 7–15)
AST SERPL W P-5'-P-CCNC: 73 U/L (ref 0–45)
BASOPHILS # BLD AUTO: 0.1 10E3/UL (ref 0–0.2)
BASOPHILS NFR BLD AUTO: 1 %
BILIRUB SERPL-MCNC: 3 MG/DL
BUN SERPL-MCNC: 6.4 MG/DL (ref 6–20)
CALCIUM SERPL-MCNC: 8.4 MG/DL (ref 8.6–10)
CHLORIDE SERPL-SCNC: 106 MMOL/L (ref 98–107)
CREAT SERPL-MCNC: 0.59 MG/DL (ref 0.51–0.95)
DEPRECATED HCO3 PLAS-SCNC: 24 MMOL/L (ref 22–29)
EGFRCR SERPLBLD CKD-EPI 2021: >90 ML/MIN/1.73M2
EOSINOPHIL # BLD AUTO: 0.3 10E3/UL (ref 0–0.7)
EOSINOPHIL NFR BLD AUTO: 3 %
ERYTHROCYTE [DISTWIDTH] IN BLOOD BY AUTOMATED COUNT: 22 % (ref 10–15)
GLUCOSE SERPL-MCNC: 89 MG/DL (ref 70–99)
HCT VFR BLD AUTO: 24.1 % (ref 35–47)
HGB BLD-MCNC: 8.2 G/DL (ref 11.7–15.7)
IMM GRANULOCYTES # BLD: 0.1 10E3/UL
IMM GRANULOCYTES NFR BLD: 1 %
LYMPHOCYTES # BLD AUTO: 1.6 10E3/UL (ref 0.8–5.3)
LYMPHOCYTES NFR BLD AUTO: 15 %
MCH RBC QN AUTO: 30.5 PG (ref 26.5–33)
MCHC RBC AUTO-ENTMCNC: 34 G/DL (ref 31.5–36.5)
MCV RBC AUTO: 90 FL (ref 78–100)
MONOCYTES # BLD AUTO: 1 10E3/UL (ref 0–1.3)
MONOCYTES NFR BLD AUTO: 9 %
NEUTROPHILS # BLD AUTO: 7.5 10E3/UL (ref 1.6–8.3)
NEUTROPHILS NFR BLD AUTO: 71 %
NRBC # BLD AUTO: 0.1 10E3/UL
NRBC BLD AUTO-RTO: 1 /100
PLATELET # BLD AUTO: 447 10E3/UL (ref 150–450)
POTASSIUM SERPL-SCNC: 3.8 MMOL/L (ref 3.4–5.3)
PROT SERPL-MCNC: 7 G/DL (ref 6.4–8.3)
RBC # BLD AUTO: 2.69 10E6/UL (ref 3.8–5.2)
RETICS # AUTO: 0.55 10E6/UL (ref 0.03–0.1)
RETICS/RBC NFR AUTO: 21.2 % (ref 0.5–2)
SODIUM SERPL-SCNC: 142 MMOL/L (ref 135–145)
WBC # BLD AUTO: 10.6 10E3/UL (ref 4–11)

## 2024-05-22 PROCEDURE — 99284 EMERGENCY DEPT VISIT MOD MDM: CPT | Mod: 25 | Performed by: EMERGENCY MEDICINE

## 2024-05-22 PROCEDURE — 85025 COMPLETE CBC W/AUTO DIFF WBC: CPT | Performed by: EMERGENCY MEDICINE

## 2024-05-22 PROCEDURE — 250N000011 HC RX IP 250 OP 636: Performed by: EMERGENCY MEDICINE

## 2024-05-22 PROCEDURE — 85045 AUTOMATED RETICULOCYTE COUNT: CPT | Performed by: EMERGENCY MEDICINE

## 2024-05-22 PROCEDURE — 258N000003 HC RX IP 258 OP 636: Performed by: EMERGENCY MEDICINE

## 2024-05-22 PROCEDURE — 96376 TX/PRO/DX INJ SAME DRUG ADON: CPT | Performed by: EMERGENCY MEDICINE

## 2024-05-22 PROCEDURE — 99285 EMERGENCY DEPT VISIT HI MDM: CPT | Performed by: EMERGENCY MEDICINE

## 2024-05-22 PROCEDURE — 96375 TX/PRO/DX INJ NEW DRUG ADDON: CPT | Performed by: EMERGENCY MEDICINE

## 2024-05-22 PROCEDURE — 96361 HYDRATE IV INFUSION ADD-ON: CPT | Performed by: EMERGENCY MEDICINE

## 2024-05-22 PROCEDURE — 36415 COLL VENOUS BLD VENIPUNCTURE: CPT | Performed by: EMERGENCY MEDICINE

## 2024-05-22 PROCEDURE — 96374 THER/PROPH/DIAG INJ IV PUSH: CPT | Performed by: EMERGENCY MEDICINE

## 2024-05-22 PROCEDURE — 80053 COMPREHEN METABOLIC PANEL: CPT | Performed by: EMERGENCY MEDICINE

## 2024-05-22 RX ORDER — KETOROLAC TROMETHAMINE 30 MG/ML
30 INJECTION, SOLUTION INTRAMUSCULAR; INTRAVENOUS ONCE
Status: COMPLETED | OUTPATIENT
Start: 2024-05-22 | End: 2024-05-22

## 2024-05-22 RX ORDER — HEPARIN SODIUM,PORCINE 10 UNIT/ML
5-10 VIAL (ML) INTRAVENOUS
Status: DISCONTINUED | OUTPATIENT
Start: 2024-05-22 | End: 2024-05-22 | Stop reason: HOSPADM

## 2024-05-22 RX ORDER — HEPARIN SODIUM,PORCINE 10 UNIT/ML
5-10 VIAL (ML) INTRAVENOUS EVERY 24 HOURS
Status: DISCONTINUED | OUTPATIENT
Start: 2024-05-22 | End: 2024-05-22 | Stop reason: HOSPADM

## 2024-05-22 RX ORDER — HEPARIN SODIUM (PORCINE) LOCK FLUSH IV SOLN 100 UNIT/ML 100 UNIT/ML
5-10 SOLUTION INTRAVENOUS
Status: DISCONTINUED | OUTPATIENT
Start: 2024-05-22 | End: 2024-05-22 | Stop reason: HOSPADM

## 2024-05-22 RX ADMIN — HYDROMORPHONE HYDROCHLORIDE 1 MG: 1 INJECTION, SOLUTION INTRAMUSCULAR; INTRAVENOUS; SUBCUTANEOUS at 04:33

## 2024-05-22 RX ADMIN — HYDROMORPHONE HYDROCHLORIDE 1 MG: 1 INJECTION, SOLUTION INTRAMUSCULAR; INTRAVENOUS; SUBCUTANEOUS at 03:02

## 2024-05-22 RX ADMIN — SODIUM CHLORIDE, POTASSIUM CHLORIDE, SODIUM LACTATE AND CALCIUM CHLORIDE 1000 ML: 600; 310; 30; 20 INJECTION, SOLUTION INTRAVENOUS at 01:28

## 2024-05-22 RX ADMIN — HYDROMORPHONE HYDROCHLORIDE 1 MG: 1 INJECTION, SOLUTION INTRAMUSCULAR; INTRAVENOUS; SUBCUTANEOUS at 01:56

## 2024-05-22 RX ADMIN — KETOROLAC TROMETHAMINE 30 MG: 30 INJECTION, SOLUTION INTRAMUSCULAR at 01:28

## 2024-05-22 RX ADMIN — HEPARIN, PORCINE (PF) 10 UNIT/ML INTRAVENOUS SYRINGE 5 ML: at 06:06

## 2024-05-22 ASSESSMENT — ACTIVITIES OF DAILY LIVING (ADL)
ADLS_ACUITY_SCORE: 35
ADLS_ACUITY_SCORE: 37

## 2024-05-22 ASSESSMENT — COLUMBIA-SUICIDE SEVERITY RATING SCALE - C-SSRS
6. HAVE YOU EVER DONE ANYTHING, STARTED TO DO ANYTHING, OR PREPARED TO DO ANYTHING TO END YOUR LIFE?: NO
2. HAVE YOU ACTUALLY HAD ANY THOUGHTS OF KILLING YOURSELF IN THE PAST MONTH?: NO
1. IN THE PAST MONTH, HAVE YOU WISHED YOU WERE DEAD OR WISHED YOU COULD GO TO SLEEP AND NOT WAKE UP?: NO

## 2024-05-22 NOTE — ED TRIAGE NOTES
Generalized discomfort, pain in right leg with bumps per pt report.     Triage Assessment (Adult)       Row Name 05/22/24 0023          Triage Assessment    Airway WDL WDL        Respiratory WDL    Respiratory WDL WDL        Skin Circulation/Temperature WDL    Skin Circulation/Temperature WDL WDL        Cardiac WDL    Cardiac WDL WDL        Peripheral/Neurovascular WDL    Peripheral Neurovascular WDL WDL        Cognitive/Neuro/Behavioral WDL    Cognitive/Neuro/Behavioral WDL WDL

## 2024-05-22 NOTE — TELEPHONE ENCOUNTER
Patient confirmed scheduled appointment:  Date: 6/21/24  Time: 2:00pm  Visit type: UMP PHYSICAL  Provider: PCP  Location: [facility]

## 2024-05-22 NOTE — DISCHARGE INSTRUCTIONS
Continue with your normal medications. Follow up on Friday as planned. Return with any new or worsening symptoms or any other concerns.

## 2024-05-22 NOTE — ED PROVIDER NOTES
[facility] EMERGENCY DEPARTMENT ([place] [facility])    5/22/24      ED PROVIDER NOTE    History     Chief Complaint   Patient presents with    Sickle Cell Pain Crisis    Leg Pain     Right pain     HPI  Jennifer Cervantes is a 24 year old female with PMH notable for sickle cell anemia, stroke leading to cognitive delays and right upper extremity hemiparesis, iron overload d/t chronic transfusions, past acute chest syndrome, DVT, and moderate persistent asthma who presents to the ED complaining of increase in her sickle cell pain.  She was just discharged from the hospital on Monday.  She states that she was feeling decently well on Monday.  Tuesday she decided to go to the infusion center to get some pain meds, hoping to maintain her improvement.  She states that by the time she left the infusion center she was feeling really good.  She states that she then noted a little bit of pain in her right lower leg, felt a couple small lumps, and started to become worried as she has had previous DVTs.  She states she does not recall striking her leg.  She states that the pain started to get a little bit worse, she started to have low back pain.  She feels that this is sickle cell pain crisis.  Again, no trauma.  She does have some chronic weakness, but no change in that.  No new numbness.  No fever, cough, shortness of breath, abdominal pain, nausea, vomiting, diarrhea.  No other complaints at this time.    This part of the medical record was transcribed by [employer] Scribe, from a dictation done by Court Ring MD.     Past Medical History  Past Medical History:   Diagnosis Date    Anxiety     Bleeding disorder (H24)     Blood clotting disorder (H24)     Cerebral infarction (H) 2015    Cognitive developmental delay     low IQ. 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 side (H)     right  hand contractures    Iron overload due to repeated red blood cell transfusions     Migraines     Multiple subsegmental pulmonary emboli without acute cor pulmonale (H) 02/01/2021    Oppositional defiant behavior     Presence of intrauterine contraceptive device 2/18/2020    Superficial venous thrombosis of arm, right 03/25/2021    Uncomplicated asthma      Past Surgical History:   Procedure Laterality Date    AS INSERT TUNNELED CV 2 CATH W/O PORT/PUMP      CHOLECYSTECTOMY      CV RIGHT HEART CATH MEASUREMENTS RECORDED N/A 11/18/2021    Procedure: Right Heart Cath;  Surgeon: Jackson Stauffer MD;  Location:  HEART CARDIAC CATH LAB    INSERT PORT VASCULAR ACCESS Left 4/21/2021    Procedure: INSERTION, VASCULAR ACCESS PORT (NOT SURE ON SIDE UNTIL REMOVAL);  Surgeon: Rajan More MD;  Location: [facility] OR    IR CHEST PORT PLACEMENT > 5 YRS OF AGE  4/21/2021    IR CVC NON TUNNEL LINE REMOVAL  5/6/2021    IR CVC NON TUNNEL PLACEMENT > 5 YRS  04/07/2020    IR CVC NON TUNNEL PLACEMENT > 5 YRS  4/30/2021    IR CVC NON TUNNEL PLACEMENT > 5 YRS  9/7/2022    IR PORT REMOVAL LEFT  4/21/2021    REMOVE PORT VASCULAR ACCESS Left 4/21/2021    Procedure: REMOVAL, VASCULAR ACCESS PORT LEFT;  Surgeon: Rajan More MD;  Location: [facility] OR    REPAIR TENDON ELBOW Right 10/02/2019    Procedure: Right Elbow Flexor Lengthening, Flexor Pronator Slide Of Wrist and Finger, Thumb Adductor Lengthening;  Surgeon: Anai Franco MD;  Location: [facility] OR    TONSILLECTOMY Bilateral 10/02/2019    Procedure: Bilateral Tonsillectomy;  Surgeon: Farhana Guy MD;  Location: [facility] OR    ZZC BREAST REDUCTION (INCLUDES LIPO) TIER 3 Bilateral 04/23/2019     ibuprofen (ADVIL/MOTRIN) 600 MG tablet  methocarbamol (ROBAXIN) 750 MG tablet  oxyCODONE IR (ROXICODONE) 10 MG tablet  acetaminophen (TYLENOL) 325 MG tablet  albuterol (PROAIR HFA/PROVENTIL HFA/VENTOLIN HFA) 108 (90 Base) MCG/ACT inhaler  albuterol (PROVENTIL) (2.5 MG/3ML) 0.083% neb  "solution  aspirin (ASA) 81 MG chewable tablet  budesonide-formoterol (SYMBICORT) 160-4.5 MCG/ACT Inhaler  deferasirox (JADENU) 360 MG tablet  EPINEPHrine (ANY BX GENERIC EQUIV) 0.3 MG/0.3ML injection 2-pack  Hydroxyurea 1000 MG TABS  ibuprofen (ADVIL/MOTRIN) 800 MG tablet  melatonin 5 MG tablet  naloxone (NARCAN) 4 MG/0.1ML nasal spray  naloxone (NARCAN) 4 MG/0.1ML nasal spray  omeprazole (PRILOSEC) 20 MG DR capsule  ondansetron (ZOFRAN) 8 MG tablet      Allergies   Allergen Reactions    Contrast Dye      Hives and breathing issues    Fish-Derived Products Hives    Seafood Hives    Adhesive Tape Hives     Primipore dressing causes hives    Gadolinium     Iodinated Contrast Media      Family History  Family History   Problem Relation Age of Onset    Sickle Cell Trait Mother     Hypertension Mother     Asthma Mother     Sickle Cell Trait Father     Glaucoma No family hx of     Macular Degeneration No family hx of     Diabetes No family hx of     Gout No family hx of      Social History   Social History     Tobacco Use    Smoking status: Never     Passive exposure: Never    Smokeless tobacco: Never   Substance Use Topics    Alcohol use: Not Currently     Alcohol/week: 0.0 standard drinks of alcohol    Drug use: Never      Past medical history, past surgical history, medications, allergies, family history, and social history were reviewed with the patient. No additional pertinent items.      A complete review of systems was performed with pertinent positives and negatives noted in the HPI, and all other systems negative.    Physical Exam   BP: 123/77  Pulse: 89  Temp: 98.3  F (36.8  C)  Resp: 18  Height: 162.6 cm (5' 4\")  Weight: 68 kg (150 lb)  SpO2: 95 %  Physical Exam  Constitutional:       General: She is not in acute distress.     Appearance: Normal appearance. She is not toxic-appearing.   HENT:      Head: Atraumatic.      Mouth/Throat:      Mouth: Mucous membranes are moist.   Eyes:      General: No scleral " icterus.     Conjunctiva/sclera: Conjunctivae normal.   Cardiovascular:      Rate and Rhythm: Normal rate.      Heart sounds: Normal heart sounds.   Pulmonary:      Effort: No respiratory distress.      Breath sounds: Normal breath sounds.   Abdominal:      Palpations: Abdomen is soft.      Tenderness: There is no abdominal tenderness.   Musculoskeletal:         General: Tenderness present. No deformity.      Cervical back: Neck supple.        Legs:    Skin:     General: Skin is warm.      Findings: No rash.   Neurological:      Mental Status: She is alert.           ED Course, Procedures, & Data      Procedures              Results for orders placed or performed during the hospital encounter of 05/22/24   Comprehensive metabolic panel     Status: Abnormal   Result Value Ref Range    Sodium 142 135 - 145 mmol/L    Potassium 3.8 3.4 - 5.3 mmol/L    Carbon Dioxide (CO2) 24 22 - 29 mmol/L    Anion Gap 12 7 - 15 mmol/L    Urea Nitrogen 6.4 6.0 - 20.0 mg/dL    Creatinine 0.59 0.51 - 0.95 mg/dL    GFR Estimate >90 >60 mL/min/1.73m2    Calcium 8.4 (L) 8.6 - 10.0 mg/dL    Chloride 106 98 - 107 mmol/L    Glucose 89 70 - 99 mg/dL    Alkaline Phosphatase 65 40 - 150 U/L    AST 73 (H) 0 - 45 U/L    ALT 81 (H) 0 - 50 U/L    Protein Total 7.0 6.4 - 8.3 g/dL    Albumin 4.4 3.5 - 5.2 g/dL    Bilirubin Total 3.0 (H) <=1.2 mg/dL   Reticulocyte count     Status: Abnormal   Result Value Ref Range    % Reticulocyte 21.2 (H) 0.5 - 2.0 %    Absolute Reticulocyte 0.548 (H) 0.025 - 0.095 10e6/uL   CBC with platelets and differential     Status: Abnormal   Result Value Ref Range    WBC Count 10.6 4.0 - 11.0 10e3/uL    RBC Count 2.69 (L) 3.80 - 5.20 10e6/uL    Hemoglobin 8.2 (L) 11.7 - 15.7 g/dL    Hematocrit 24.1 (L) 35.0 - 47.0 %    MCV 90 78 - 100 fL    MCH 30.5 26.5 - 33.0 pg    MCHC 34.0 31.5 - 36.5 g/dL    RDW 22.0 (H) 10.0 - 15.0 %    Platelet Count 447 150 - 450 10e3/uL    % Neutrophils 71 %    % Lymphocytes 15 %    % Monocytes 9 %     % Eosinophils 3 %    % Basophils 1 %    % Immature Granulocytes 1 %    NRBCs per 100 WBC 1 (H) <1 /100    Absolute Neutrophils 7.5 1.6 - 8.3 10e3/uL    Absolute Lymphocytes 1.6 0.8 - 5.3 10e3/uL    Absolute Monocytes 1.0 0.0 - 1.3 10e3/uL    Absolute Eosinophils 0.3 0.0 - 0.7 10e3/uL    Absolute Basophils 0.1 0.0 - 0.2 10e3/uL    Absolute Immature Granulocytes 0.1 <=0.4 10e3/uL    Absolute NRBCs 0.1 10e3/uL   CBC with platelets differential     Status: Abnormal    Narrative    The following orders were created for panel order CBC with platelets differential.  Procedure                               Abnormality         Status                     ---------                               -----------         ------                     CBC with platelets and d...[[id number]]  Abnormal            Final result                 Please view results for these tests on the individual orders.     Medications   sodium chloride (PF) 0.9% PF flush 10-20 mL (10 mLs Intracatheter $Given 5/22/24 0605)   sodium chloride (PF) 0.9% PF flush 10-20 mL (has no administration in time range)   sodium chloride (PF) 0.9% PF flush 10-20 mL (has no administration in time range)   heparin lock flush 10 UNIT/ML injection 5-10 mL (5 mLs Intracatheter $Given 5/22/24 0606)   heparin lock flush 10 UNIT/ML injection 5-10 mL (has no administration in time range)   heparin 100 unit/mL injection 5-10 mL (has no administration in time range)   HYDROmorphone (DILAUDID) injection 1 mg (1 mg Intravenous $Given 5/22/24 0433)   ketorolac (TORADOL) injection 30 mg (30 mg Intravenous $Given 5/22/24 0128)   lactated ringers BOLUS 1,000 mL (0 mLs Intravenous Stopped 5/22/24 0937)     Labs Ordered and Resulted from Time of ED Arrival to Time of ED Departure   COMPREHENSIVE METABOLIC PANEL - Abnormal       Result Value    Sodium 142      Potassium 3.8      Carbon Dioxide (CO2) 24      Anion Gap 12      Urea Nitrogen 6.4      Creatinine 0.59      GFR Estimate >90       Calcium 8.4 (*)     Chloride 106      Glucose 89      Alkaline Phosphatase 65      AST 73 (*)     ALT 81 (*)     Protein Total 7.0      Albumin 4.4      Bilirubin Total 3.0 (*)    RETICULOCYTE COUNT - Abnormal    % Reticulocyte 21.2 (*)     Absolute Reticulocyte 0.548 (*)    CBC WITH PLATELETS AND DIFFERENTIAL - Abnormal    WBC Count 10.6      RBC Count 2.69 (*)     Hemoglobin 8.2 (*)     Hematocrit 24.1 (*)     MCV 90      MCH 30.5      MCHC 34.0      RDW 22.0 (*)     Platelet Count 447      % Neutrophils 71      % Lymphocytes 15      % Monocytes 9      % Eosinophils 3      % Basophils 1      % Immature Granulocytes 1      NRBCs per 100 WBC 1 (*)     Absolute Neutrophils 7.5      Absolute Lymphocytes 1.6      Absolute Monocytes 1.0      Absolute Eosinophils 0.3      Absolute Basophils 0.1      Absolute Immature Granulocytes 0.1      Absolute NRBCs 0.1     ROUTINE UA WITH MICROSCOPIC     No orders to display          Critical care was not performed.     Medical Decision Making  The patient's presentation was of high complexity (a chronic illness severe exacerbation, progression, or side effect of treatment).    The patient's evaluation involved:  review of external note(s) from 2 sources (previous notes)  review of 3+ test result(s) ordered prior to this encounter (previous results)  ordering and/or review of 3+ test(s) in this encounter (see separate area of note for details)    The patient's management necessitated high risk (a parenteral controlled substance).    Assessment & Plan    The bumps that she noticed on her legs seem to be overlying small contusions.  They are very superficial feeling, I do not think they represent DVT.  She does feel she is having pain crisis with pain in her back.  She was treated with a protocol.  She is retaking, her hemoglobin is up from most recent baseline.  T. bili is up a little to 3.0 from most recent 2.6.  AST and ALT are up slightly at 81 and 73 respectively.  This is up  from most recent baseline.  She has had mildly elevated transaminases in the past.  She does not report abdominal pain, no tenderness on exam today.  She is status postcholecystectomy.  I did let her know about these findings.  No clearly infectious signs or symptoms, other sign of infection on exam.  No trauma.  She did receive 3 doses of Dilaudid per protocol as well as Toradol.  She reports she feels improved, feels she can discharge home.  She does have an appointment in 2 days with her hematology clinic.  She is encouraged to keep this appointment, return to the ER with any new or worsening symptoms, any other concerns.  She verbalizes understanding and is agreeable to the plan.    Dictation Disclaimer: Some of this Note has been completed with voice-recognition dictation software. Although errors are generally corrected real-time, there is the potential for a rare error to be present in the completed chart.      I have reviewed the nursing notes. I have reviewed the findings, diagnosis, plan and need for follow up with the patient.    Discharge Medication List as of 5/22/2024  5:50 AM          Final diagnoses:   Sickle cell pain crisis (H)       Court Ring  [facility] EMERGENCY DEPARTMENT  5/22/2024     Court Ring MD  05/22/24 0620

## 2024-05-23 ENCOUNTER — PATIENT OUTREACH (OUTPATIENT)
Dept: ONCOLOGY | Facility: CLINIC | Age: 24
End: 2024-05-23

## 2024-05-23 ENCOUNTER — PATIENT OUTREACH (OUTPATIENT)
Dept: CARE COORDINATION | Facility: CLINIC | Age: 24
End: 2024-05-23
Payer: COMMERCIAL

## 2024-05-23 ENCOUNTER — INFUSION THERAPY VISIT (OUTPATIENT)
Dept: ONCOLOGY | Facility: CLINIC | Age: 24
End: 2024-05-23
Payer: COMMERCIAL

## 2024-05-23 ENCOUNTER — NURSE TRIAGE (OUTPATIENT)
Dept: ONCOLOGY | Facility: CLINIC | Age: 24
End: 2024-05-23
Payer: COMMERCIAL

## 2024-05-23 VITALS
HEART RATE: 90 BPM | SYSTOLIC BLOOD PRESSURE: 125 MMHG | DIASTOLIC BLOOD PRESSURE: 74 MMHG | OXYGEN SATURATION: 96 % | TEMPERATURE: 98.2 F

## 2024-05-23 DIAGNOSIS — G81.10 SPASTIC HEMIPLEGIA, UNSPECIFIED ETIOLOGY, UNSPECIFIED LATERALITY (H): ICD-10-CM

## 2024-05-23 DIAGNOSIS — D57.00 SICKLE CELL PAIN CRISIS (H): Primary | ICD-10-CM

## 2024-05-23 PROCEDURE — 250N000011 HC RX IP 250 OP 636: Performed by: PEDIATRICS

## 2024-05-23 PROCEDURE — 96375 TX/PRO/DX INJ NEW DRUG ADDON: CPT

## 2024-05-23 PROCEDURE — 258N000003 HC RX IP 258 OP 636: Performed by: PEDIATRICS

## 2024-05-23 PROCEDURE — 96376 TX/PRO/DX INJ SAME DRUG ADON: CPT

## 2024-05-23 PROCEDURE — 96361 HYDRATE IV INFUSION ADD-ON: CPT

## 2024-05-23 PROCEDURE — 250N000013 HC RX MED GY IP 250 OP 250 PS 637: Performed by: PEDIATRICS

## 2024-05-23 PROCEDURE — 96365 THER/PROPH/DIAG IV INF INIT: CPT

## 2024-05-23 PROCEDURE — 96413 CHEMO IV INFUSION 1 HR: CPT

## 2024-05-23 RX ORDER — DIPHENHYDRAMINE HYDROCHLORIDE 50 MG/ML
50 INJECTION INTRAMUSCULAR; INTRAVENOUS
Status: CANCELLED
Start: 2024-06-13

## 2024-05-23 RX ORDER — EPINEPHRINE 1 MG/ML
0.3 INJECTION, SOLUTION, CONCENTRATE INTRAVENOUS EVERY 5 MIN PRN
Status: CANCELLED | OUTPATIENT
Start: 2024-06-13

## 2024-05-23 RX ORDER — ONDANSETRON 2 MG/ML
8 INJECTION INTRAMUSCULAR; INTRAVENOUS EVERY 6 HOURS PRN
Status: CANCELLED
Start: 2024-07-01

## 2024-05-23 RX ORDER — DIPHENHYDRAMINE HCL 25 MG
50 CAPSULE ORAL
Status: CANCELLED
Start: 2024-07-01

## 2024-05-23 RX ORDER — HEPARIN SODIUM,PORCINE 10 UNIT/ML
5 VIAL (ML) INTRAVENOUS
Status: CANCELLED | OUTPATIENT
Start: 2024-07-01

## 2024-05-23 RX ORDER — MEPERIDINE HYDROCHLORIDE 25 MG/ML
25 INJECTION INTRAMUSCULAR; INTRAVENOUS; SUBCUTANEOUS EVERY 30 MIN PRN
Status: CANCELLED | OUTPATIENT
Start: 2024-06-13

## 2024-05-23 RX ORDER — ONDANSETRON 4 MG/1
8 TABLET, FILM COATED ORAL
Status: CANCELLED
Start: 2024-07-01

## 2024-05-23 RX ORDER — KETOROLAC TROMETHAMINE 30 MG/ML
30 INJECTION, SOLUTION INTRAMUSCULAR; INTRAVENOUS ONCE
Status: COMPLETED | OUTPATIENT
Start: 2024-05-23 | End: 2024-05-23

## 2024-05-23 RX ORDER — ONDANSETRON 2 MG/ML
8 INJECTION INTRAMUSCULAR; INTRAVENOUS EVERY 6 HOURS PRN
Status: DISCONTINUED | OUTPATIENT
Start: 2024-05-23 | End: 2024-05-23 | Stop reason: HOSPADM

## 2024-05-23 RX ORDER — ALBUTEROL SULFATE 90 UG/1
1-2 AEROSOL, METERED RESPIRATORY (INHALATION)
Status: CANCELLED
Start: 2024-06-13

## 2024-05-23 RX ORDER — ALBUTEROL SULFATE 0.83 MG/ML
2.5 SOLUTION RESPIRATORY (INHALATION)
Status: CANCELLED | OUTPATIENT
Start: 2024-06-13

## 2024-05-23 RX ORDER — HEPARIN SODIUM (PORCINE) LOCK FLUSH IV SOLN 100 UNIT/ML 100 UNIT/ML
5 SOLUTION INTRAVENOUS
Status: DISCONTINUED | OUTPATIENT
Start: 2024-05-23 | End: 2024-05-23 | Stop reason: HOSPADM

## 2024-05-23 RX ORDER — HEPARIN SODIUM (PORCINE) LOCK FLUSH IV SOLN 100 UNIT/ML 100 UNIT/ML
5 SOLUTION INTRAVENOUS
Status: CANCELLED | OUTPATIENT
Start: 2024-07-01

## 2024-05-23 RX ORDER — KETOROLAC TROMETHAMINE 30 MG/ML
30 INJECTION, SOLUTION INTRAMUSCULAR; INTRAVENOUS ONCE
Status: CANCELLED
Start: 2024-07-01 | End: 2024-07-01

## 2024-05-23 RX ORDER — METHYLPREDNISOLONE SODIUM SUCCINATE 125 MG/2ML
125 INJECTION, POWDER, LYOPHILIZED, FOR SOLUTION INTRAMUSCULAR; INTRAVENOUS
Status: CANCELLED
Start: 2024-06-13

## 2024-05-23 RX ORDER — HEPARIN SODIUM,PORCINE 10 UNIT/ML
5 VIAL (ML) INTRAVENOUS
Status: CANCELLED | OUTPATIENT
Start: 2024-06-13

## 2024-05-23 RX ORDER — HEPARIN SODIUM (PORCINE) LOCK FLUSH IV SOLN 100 UNIT/ML 100 UNIT/ML
5 SOLUTION INTRAVENOUS
Status: CANCELLED | OUTPATIENT
Start: 2024-06-13

## 2024-05-23 RX ORDER — DIPHENHYDRAMINE HCL 25 MG
50 CAPSULE ORAL
Status: COMPLETED | OUTPATIENT
Start: 2024-05-23 | End: 2024-05-23

## 2024-05-23 RX ADMIN — KETOROLAC TROMETHAMINE 30 MG: 30 INJECTION, SOLUTION INTRAMUSCULAR; INTRAVENOUS at 09:14

## 2024-05-23 RX ADMIN — Medication 5 ML: at 12:06

## 2024-05-23 RX ADMIN — SODIUM CHLORIDE 250 ML: 9 INJECTION, SOLUTION INTRAVENOUS at 10:21

## 2024-05-23 RX ADMIN — HYDROMORPHONE HYDROCHLORIDE 1 MG: 1 INJECTION, SOLUTION INTRAMUSCULAR; INTRAVENOUS; SUBCUTANEOUS at 10:13

## 2024-05-23 RX ADMIN — SODIUM CHLORIDE, POTASSIUM CHLORIDE, SODIUM LACTATE AND CALCIUM CHLORIDE 1000 ML: 600; 310; 30; 20 INJECTION, SOLUTION INTRAVENOUS at 09:09

## 2024-05-23 RX ADMIN — SODIUM CHLORIDE 339.5 MG: 9 INJECTION, SOLUTION INTRAVENOUS at 10:21

## 2024-05-23 RX ADMIN — HYDROMORPHONE HYDROCHLORIDE 1 MG: 1 INJECTION, SOLUTION INTRAMUSCULAR; INTRAVENOUS; SUBCUTANEOUS at 11:35

## 2024-05-23 RX ADMIN — DIPHENHYDRAMINE HYDROCHLORIDE 50 MG: 25 CAPSULE ORAL at 09:10

## 2024-05-23 RX ADMIN — HYDROMORPHONE HYDROCHLORIDE 1 MG: 1 INJECTION, SOLUTION INTRAMUSCULAR; INTRAVENOUS; SUBCUTANEOUS at 09:09

## 2024-05-23 RX ADMIN — ONDANSETRON 8 MG: 2 INJECTION INTRAMUSCULAR; INTRAVENOUS at 09:18

## 2024-05-23 NOTE — PROGRESS NOTES
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 She had multiple thromboembolic events in 2021 despite adherent anticoagulation use (though warfarin was perpetually low) and there are concerns for chronic thromboembolic disease but did not have pulmonary HTN on a November 2021 cath. She is maintained on chronic PO opioids and twice-weekly infusion visits (since 1/24/22) but has been able to be maintained on this regimen and has stayed out of the ED most of the time with even rarer admissions for most of 2023.     Interval History:  Jennifer is seen for routine follow-up.     She was recently admitted 5/16 through 5/24 vaso-occlusive pain crisis.  Due to acute, severe abdominal pain she did have CT and ultrasound imaging while inpatient which was unremarkable.  Her abdominal symptoms have improved.  Overall she does still experience nausea and vomiting a few times per week.  This has been going on for many months.  She missed her last EGD due to not having a ride.    She has been struggling at home with her mom over the past few weeks.  This happens periodically.  Her mom acts as her PCA but Jennifer says she often declines to give her rides to appointments or do things just put her hair up.  She feels she has had increased pain as result of this stress.  She continues to treat her pain at home with oxycodone, Tylenol, ibuprofen, methocarbamol and hot baths.  She has been seen in the infusion center a few times this week although has not found this significantly helpful.   Most of her pain is focused in her pelvis at the moment.    She put in her 2-week notice at her current job.  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  She has an in person interview in just over a week.      Sickle Cell Disease Comprehensive Checklist  Bone Health/Avascular Necrosis Screening/Symptoms (each visit): no new concerns today  Leg Ulcer evaluation (every visit): no  Hypertension (every visit):stable 11/3/23  Last pulmonary evaluation (asthma, AMAN, pulm HTN): 9/28/22, due for follow-up  Stroke/silent cerebral infarct Hx (Y/N): Yes TIA ~2014, first event ~age 1 with full stroke and R sided weakness  Last PCP Visit: 3/6/23  Vaccines:  PCV13: 5/13/19  Pneumovax (PPSV23): 3/04, 10/09, 7/12/19 (next due 7/2024)  Menactra: 4/2010, 9/2015 (MCV done 8/16/21)  MenB: 9/16/15, 5/13/19  Influenza: 10/2/23  Audiology (chelation): done 2/27/24  Comparison to Previous Audiogram dated 6/6/2022:     Pure Tone Thresholds 250-8000 Hz:    RIGHT: stable    LEFT: stable     High Frequency Audiometry 9,000-16,000Hz:     RIGHT: stable    LEFT: stable     Word Recognition Score:    RIGHT: stable    LEFT: stable    Plan last reviewed with patient: 10/2/23    Patient background: 22 yo F, enjoys movies and kids though there are times where she does not really want to talk to people. Does not have a lot of social support at home.     Sickle Cell Disease History  Primary Hematologist Team: Jose Rafael Duncan  PCP: none  Genotype: SS  Acute Pain Crisis Treatment: (limiting IV)   ER   Dilaudid 1 mg IV q1h up to 3 doses  Toradol 30 mg IV x1   Maintenance IV fluids with LR  Other: Zofran 8 mg IV PRN nausea  Inpatient:  PCA Dilaudid 1 hr q30 minutes, no basal rate  Toradol 30 mg x6h x 4 hr  LR maintenance x 1-2 days  Other Medications: Zofran  ASA  Supportive Care: Docusate, Senna  Chronic Pain Medications:    Home regimen: oxycodone 15 mg p.o. q.4-6 hours p.r.n. breakthrough pain.  She also  continues on Voltaren gel, and Zoloft among other medications.    -Also benefits from mental health visits, acupuncture  Baseline Hemoglobin: 7 g/dl without chronic transfusions  Hydroxyurea use: Yes  H/O blood transfusions: Yes, several (iron overload) Most recent 11/20/2021  H/O Transfusion Reactions: no  Antibodies:none  H/O Acute Chest Syndrome: Yes  Last episode:9/05/22 (previously 4/26/21, 10/2019)   ICU/intubation: not with 9/2022 admission  H/O Stroke: Yes (managed with chronic transfusions in the past, stopped late Spring 2020)  H/O VTE: Yes (2/2021)  H/O Cholecystectomy or Splenectomy: no  H/O Asthma, Pulm HTN, AVN, Leg Ulcers, Nephropathy, Retinopathy, etc: Iron overload, asthma, chronic lung disease, physical limitations from early stroke    ---------------------------------------  Jennifer Cervantes's Goals (reviewed today 5/24/24)    1-3 month goal:  Interview for a new job    6 month goal:  Find her own place after saving up money    12 month goal:      Disease-specific goal(s):  Decrease frequency of ED visits. Decrease opioid qty by 10 tablets with next refill (June)      Current Outpatient Medications   Medication Sig Dispense Refill    acetaminophen (TYLENOL) 325 MG tablet Take 3 tablets (975 mg) by mouth every 8 hours 270 tablet 0    albuterol (PROAIR HFA/PROVENTIL HFA/VENTOLIN HFA) 108 (90 Base) MCG/ACT inhaler Inhale 2 puffs into the lungs every 6 hours as needed for shortness of breath or wheezing 8.5 g 3    albuterol (PROVENTIL) (2.5 MG/3ML) 0.083% neb solution Take 2 vials (5 mg) by nebulization every 6 hours as needed for shortness of breath or wheezing 90 mL 3    aspirin (ASA) 81 MG chewable tablet Take 1 tablet (81 mg) by mouth 2 times daily 60 tablet 11    budesonide-formoterol (SYMBICORT) 160-4.5 MCG/ACT Inhaler Inhale 2 puffs twice daily plus 1-2 puffs as needed. May use up to 12 puffs per day. 20.4 g 11    deferasirox (JADENU) 360 MG tablet Take 4 tablets (1,440 mg) by mouth every evening  120 tablet 4    EPINEPHrine (ANY BX GENERIC EQUIV) 0.3 MG/0.3ML injection 2-pack Inject 0.3 mLs (0.3 mg) into the muscle as needed for anaphylaxis 1 each 1    Hydroxyurea 1000 MG TABS Take 3,000 mg by mouth daily 90 tablet 3    ibuprofen (ADVIL/MOTRIN) 600 MG tablet Take 600 mg by mouth every 6 hours as needed for moderate pain Using rarely, 2x/week at most      ibuprofen (ADVIL/MOTRIN) 800 MG tablet Take 800 mg by mouth every 8 hours as needed for moderate pain      melatonin 5 MG tablet Take 1 tablet (5 mg) by mouth nightly as needed for sleep 30 tablet 1    methocarbamol (ROBAXIN) 750 MG tablet Take 1 tablet (750 mg) by mouth 4 times daily as needed for muscle spasms (during sickle pain crises. Okay to take scheduled while in pain) 60 tablet 1    naloxone (NARCAN) 4 MG/0.1ML nasal spray Spray 1 spray (4 mg) into one nostril alternating nostrils as needed for opioid reversal every 2-3 minutes until assistance arrives 0.2 mL 0    naloxone (NARCAN) 4 MG/0.1ML nasal spray Spray 4 mg into one nostril alternating nostrils as needed for opioid reversal every 2-3 minutes until assistance arrives      omeprazole (PRILOSEC) 20 MG DR capsule Take 1 capsule (20 mg) by mouth daily 30 capsule 0    ondansetron (ZOFRAN) 8 MG tablet Take 1 tablet (8 mg) by mouth every 8 hours as needed for nausea 30 tablet 1    oxyCODONE IR (ROXICODONE) 10 MG tablet Take 1 tablet (10 mg) by mouth every 4 hours as needed for severe pain or breakthrough pain Goal 4 per day. Max 6 per day. 20 tablet 0       Past Medical History  Past Medical History:   Diagnosis Date    Anxiety     Bleeding disorder (H24)     Blood clotting disorder (H24)     Cerebral infarction (H) 2015    Cognitive developmental delay     low IQ. 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 side (H)     right hand contractures    Iron overload due to repeated  red blood cell transfusions     Migraines     Multiple subsegmental pulmonary emboli without acute cor pulmonale (H) 02/01/2021    Oppositional defiant behavior     Presence of intrauterine contraceptive device 2/18/2020    Superficial venous thrombosis of arm, right 03/25/2021    Uncomplicated asthma      Past Surgical History:   Procedure Laterality Date    AS INSERT TUNNELED CV 2 CATH W/O PORT/PUMP      CHOLECYSTECTOMY      CV RIGHT HEART CATH MEASUREMENTS RECORDED N/A 11/18/2021    Procedure: Right Heart Cath;  Surgeon: Jackson Stauffer MD;  Location:  HEART CARDIAC CATH LAB    INSERT PORT VASCULAR ACCESS Left 4/21/2021    Procedure: INSERTION, VASCULAR ACCESS PORT (NOT SURE ON SIDE UNTIL REMOVAL);  Surgeon: Rajan More MD;  Location: [facility] OR    IR CHEST PORT PLACEMENT > 5 YRS OF AGE  4/21/2021    IR CVC NON TUNNEL LINE REMOVAL  5/6/2021    IR CVC NON TUNNEL PLACEMENT > 5 YRS  04/07/2020    IR CVC NON TUNNEL PLACEMENT > 5 YRS  4/30/2021    IR CVC NON TUNNEL PLACEMENT > 5 YRS  9/7/2022    IR PORT REMOVAL LEFT  4/21/2021    REMOVE PORT VASCULAR ACCESS Left 4/21/2021    Procedure: REMOVAL, VASCULAR ACCESS PORT LEFT;  Surgeon: Rajan More MD;  Location: [facility] OR    REPAIR TENDON ELBOW Right 10/02/2019    Procedure: Right Elbow Flexor Lengthening, Flexor Pronator Slide Of Wrist and Finger, Thumb Adductor Lengthening;  Surgeon: Anai Franco MD;  Location: [facility] OR    TONSILLECTOMY Bilateral 10/02/2019    Procedure: Bilateral Tonsillectomy;  Surgeon: Farhana Guy MD;  Location: [facility] OR    ZZC BREAST REDUCTION (INCLUDES LIPO) TIER 3 Bilateral 04/23/2019     Allergies   Allergen Reactions    Contrast Dye      Hives and breathing issues    Fish-Derived Products Hives    Seafood Hives    Adhesive Tape Hives     Primipore dressing causes hives    Gadolinium     Iodinated Contrast Media      Social History   Social History     Tobacco Use    Smoking status: Never     Passive exposure: Never     Smokeless tobacco: Never   Substance Use Topics    Alcohol use: Not Currently     Alcohol/week: 0.0 standard drinks of alcohol    Drug use: Never   Her mom lives next door to her.  Past medical history and social history were reviewed.    Physical Examination:  /81   Pulse 103   Temp 98.9  F (37.2  C) (Oral)   Resp 16   Wt 70.3 kg (154 lb 14.4 oz)   LMP  (LMP Unknown)   SpO2 95%   BMI 26.59 kg/m        Wt Readings from Last 10 Encounters:   05/24/24 70.3 kg (154 lb 14.4 oz)   05/22/24 68 kg (150 lb)   05/03/24 67.9 kg (149 lb 12.8 oz)   04/30/24 67.4 kg (148 lb 9.4 oz)   04/19/24 69.3 kg (152 lb 12.8 oz)   04/18/24 68.5 kg (151 lb 0.2 oz)   04/11/24 70.2 kg (154 lb 12.8 oz)   04/08/24 69 kg (152 lb 1.6 oz)   04/05/24 68 kg (150 lb)   03/22/24 67.6 kg (149 lb)     General: Pleasant female, NAD  Eyes: EOMI, PERRL  Respiratory: Normal effort, no adventitious breath sounds  Ext: no peripheral edema  Neurologic: Grossly nonfocal. A/O x 4.  Skin: No rashes, petechiae, or bruising noted on exposed skin.   Laboratory Data:  Most Recent 3 CBC's:  Recent Labs   Lab Test 05/24/24  0942 05/22/24  0124 05/19/24  0755 05/17/24  0658 05/16/24  1218   WBC 10.0 10.6 11.8*   < > 16.6*   HGB 8.3* 8.2* 6.8*   < > 6.6*   MCV 89 90 87   < > 88    447 438   < > 476*   ANEUTAUTO 7.6 7.5  --   --  12.6*    < > = values in this interval not displayed.    Most Recent 3 BMP's:  Recent Labs   Lab Test 05/24/24  0942 05/22/24  0124 05/17/24  0658 05/16/24  1218    142 141 138   POTASSIUM 4.3 3.8 4.2 4.2   CHLORIDE 107 106 107 106   CO2 22 24 26 25   BUN 8.5 6.4 6.6 10.9   CR 0.51 0.59 0.57 0.56   ANIONGAP 10 12 8 7   MICAH 8.9 8.4* 8.3* 8.6   GLC 93 89 86 86   PROTTOTAL  --  7.0 6.5 6.6   ALBUMIN  --  4.4 4.1 4.2    Most Recent 2 LFT's:  Recent Labs   Lab Test 05/22/24  0124 05/17/24  0658   AST 73* 36   ALT 81* 20   ALKPHOS 65 54   BILITOTAL 3.0* 2.6*    Most Recent TSH and T4:  Recent Labs   Lab Test 04/23/24  6284    TSH 0.60     Phos/Mag:  Lab Results   Component Value Date    PHOS 4.8 (H) 05/13/2023    PHOS 5.0 (H) 05/12/2023    PHOS 3.6 02/21/2021    MAG 2.0 05/16/2024    MAG 1.7 04/29/2024    MAG 1.9 04/28/2024      I reviewed the above labs today.    Assessment and Plan:  1. Sickle Cell HgbSS Disease  2. Acute pain crises  3. Chronic Pain  4. 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 History of CVA  7. Hearing loss  8. Nausea/vomiting       Jennifer is seen today for routine follow-up and recent hospital follow-up after admission 5/16 through 5/20 for sickle cell pain crisis.  She acknowledges that she did need to go to the ED once this week and was seen in the infusion center 2 times for pain control.  She feels that her recent pain exacerbation is related to emotional stress regarding relationships with her mom and siblings.  She feels safe in her current housing situation but has a goal to save up enough money to move out within the next few months.  She recently quit her job and is applied for a new job at the [employer] which she is very excited about.    Today we discussed pain control efforts and acknowledged that although her current stressors are contributing to physical pain they will not necessarily successfully be treated with additional opioids.  She is motivated to continue to limit her infusion center visits and even more so limit her ED visits if possible.  We decided not to adjust her oxycodone prescription today but she feels strongly that she would like to decrease her total tablet count by 5 to 10 tablets when I see her in about 3 weeks.    She remains on Jadenu but has been taking a break from additional chelation for a few months now.  Repeat Ferriscan was performed 5/3 showing and increase in LIC now at 31.8 compared to 28.5 in September 2023. We will likely add deferiprone to her chelation regimen but I will first confirm this with   Perla.     She missed her last EGD but continues to have persistent nausea and appetite changes as well as intermittent epigastric pain. I would still like her to pursue this to further evaluate for possible gastritis or ulcers. Currently this is rescheduled in November but we can inquire about an earlier appointment.     -------------------------------------  Plan:  -Continue Hydrea to 3000mg daily to help lessen frequency of sickle cell pain.   -Continue monthly crizanlizumab infusions.  -Continue slow taper of oxycodone. Currently receiving oxycodone 10 mg every 4 hours PRN, qty 20. Plan to tapering to qty 15 in about 3 weeks. I'll see her prior to this.  -She can self-reduce infusion days/week and we will continue to keep the cap at 2/week for now. This was recently revisited with her care team.   -Continue infusion center visits limited to two times per week (Mondays and Fridays ideally although still needs to call to request). Continue diligent home management with current medications, heat, rest, compression, warm baths. Methocarbamol was recently added which Jennifer is utilizing and finds helpful.  -If unable to manage at home can go to ED  with continued plan to use IV Dilaudid and take a break from ketamine (10/2/23). No PCA use and goal for any admission would still be to discharge by 5 days or less  -EGD for evaluation of ongoing n/v  - Continue Jadenu. Will consider deferiprone based on 5/3 Ferriscan results.  -Continue aspirin BID  -RTC in 3 weeks, coordinate with sidney Mantilla CNP  ---  60 minutes spent on the date of the encounter doing chart review, review of test results, interpretation of tests, patient visit, and documentation.    The longitudinal plan of care for the diagnosis(es)/condition(s) as documented were addressed during this visit. Due to the added complexity in care, I will continue to support Jennifer in the subsequent management and with ongoing continuity of  care.

## 2024-05-23 NOTE — PROGRESS NOTES
Social Work - Transportation  [facility]    Data/Intervention:  Patient Name: Jennifer Cervantes Goes By: Jennifer    /Age: 2000 (24 year old)    [facility]  Reason for Referral:  support requested for patient transportation needs for today's appointment.  Assessment:  called [employer] to arrange ride through patient's insurance. [employer] arranged ride home with [employer]. Patient will need to call [phone] when ready for return ride home.  Plan: Patient is aware of the transportation plan.  available to assist with any other needs.    CARLOS Chavez,SW  Hematology/Oncology Social Worker  Phone:[phone] Pager: [phone]

## 2024-05-23 NOTE — PROGRESS NOTES
~~~ NOTE: If the patient answers yes to any of the questions below, hold the infusion and contact ordering provider or on-call provider.    Do you currently have any signs of illness or infection or are you on any antibiotics? No  Have you recently had an elevated temperature, fever, chills, productive cough, coughing for 3 weeks or longer or hemoptysis, abnormal vital signs, night sweats, chest pain or have you noticed a decrease in your appetite, unexplained weight loss or fatigue? No  Have you had any new, sudden, or worsening abdominal pain? No  Do you have any open wounds or new incisions? (exclude for patients with hidradenitis suppurativa) No  Have you recently been diagnosed with any new nervous system diseases (ie. Multiple sclerosis, Guillain Bethel, seizures, neurological changes) or cancer diagnosis? Are you on any form of radiation or chemotherapy? No  Have there been any other new onset medical symptoms? No  Are you pregnant or breast feeding or do you have plans of pregnancy in the future? No; N/A  Do you have any upcoming hospitalizations or surgeries? Does not include esophagogastroduodenoscopy, colonoscopy, endoscopic retrograde cholangiopancreatography (ERCP), endoscopic ultrasound (EUS), dental procedures (including cleanings, fillings, implants, extractions)  or joint aspiration/steroid injections No  Have you or anyone in your household received a live vaccination in the past 4 weeks? Please note: No live vaccines while on biologic/chemotherapy until 6 months after the last treatment. Patient can receive the flu vaccine (shot only).  It is optimal for the patient to get it mid cycle, but it can be given at any time as long as it is not on the day of the infusion. No  If applicable to prescribed medication, confirm negative PPD or quantiferon gold MTB. If positive, verify has negative chest x-ray or the patient is at least 4 weeks post initiation of INH/B6 therapy and have clearance from provider  before infusion (Y/N:[id number]) NA  If applicable to prescribed medication, confirm negative hepatitis B surface antigen or hepatitis C. If positive, clearance from provider before infusion. (Y/N: [id number]) NA  Rheumatology patients receiving tocilizumab (Actemra): If labs were drawn within the past week, hold dosing until cleared to infuse If AST/ALT > 2 X upper limit normal; ANC < 1.0. N/A  Patients receiving belimumab (Benlysta): Have you been having any signs of worsening depression or suicidal ideations? N/A  Infusion Nursing Note:  Jennifer Cervantes presents today for IV fluids, pain meds, crizanlizumab.    Patient seen by provider today: No   present during visit today: Not Applicable.    Note: Patient arrived to infusion with pain 9/10 in her back, hips, pelvis.  Denies any other new physical concerns. Toradol x1 and dilaudid given x3 with pain improved to 6/10 prior to discharge    Patient was admitted last week when her sidney was due. Patricia Mantilla gave infusion RN approval to give sidney today and patient agreeable to receiving       Intravenous Access:  Implanted Port accessed in infusion     Treatment Conditions:    Labs drawn in the ED yesterday  Lab Results   Component Value Date    HGB 8.2 (L) 05/22/2024    WBC 10.6 05/22/2024    ANEU 9.0 (H) 03/17/2024    ANEUTAUTO 7.5 05/22/2024     05/22/2024        Lab Results   Component Value Date     05/22/2024    POTASSIUM 3.8 05/22/2024    MAG 2.0 05/16/2024    CR 0.59 05/22/2024    MICAH 8.4 (L) 05/22/2024    BILITOTAL 3.0 (H) 05/22/2024    ALBUMIN 4.4 05/22/2024    ALT 81 (H) 05/22/2024    AST 73 (H) 05/22/2024         Post Infusion Assessment:  Patient tolerated infusion without incident.  Patient observed for 30 minutes post sidney per protocol.  Blood return noted pre and post infusion.  Site patent and intact, free from redness, edema or discomfort.  No evidence of extravasations.  Access discontinued per protocol.    Biologic Infusion Post  Education: Call the triage nurse at your clinic or seek medical attention if you have chills and/or temperature greater than or equal to 100.5, uncontrolled nausea/vomiting, diarrhea, constipation, dizziness, shortness of breath, chest pain, heart palpitations, weakness or any other new or concerning symptoms, questions or concerns.  You cannot have any live virus vaccines prior to or during treatment or up to 6 months post infusion.  If you have an upcoming surgery, medical procedure or dental procedure during treatment, this should be discussed with your ordering physician and your surgeon/dentist.  If you are having any concerning symptom, if you are unsure if you should get your next infusion or wish to speak to a provider before your next infusion, please call your care coordinator or triage nurse at your clinic to notify them so we can adequately serve you.       Discharge Plan:   Patient declined prescription refills.  Discharge instructions reviewed with: Patient.  Patient and/or family verbalized understanding of discharge instructions and all questions answered.  AVS to patient via [facility].  Patient will return tomorrow for next provider appointment.   Patient discharged in stable condition accompanied by: self.  Departure Mode: Ambulatory.      Melissa Oliver RN

## 2024-05-23 NOTE — TELEPHONE ENCOUNTER
Oncology Nurse Triage - Sickle Cell Pain Crisis:    Situation: Adelaide  calling about Sickle Cell Pain Crisis, requesting to be added on for IV fluids and pain medicine    Background:     Patient's last infusion was ED on 5/22/2024  Last clinic visit date:5/3/2024 Patricia Mantilla CNP  Does patient have active treatment plan?  Yes      Assessment of Symptoms:  Onset/Duration of symptoms: 1 day    Is it typical sickle cell pain? Yes   Location: lower back and pelvis  Character: Sharp           Intensity: 9/10    Any radiation of pain, numbness, tingling, weakness, warmth, swelling, discoloration of arms or legs?No     Fever?No    Chest Pain Present: No     Shortness of breath: No     Other home therapies tried: HEAT/HEATING PAD and WARM BATH     Last home medication taken and when: 6:00am Oxycodone    Any Refills Needed?: No     Does patient have transportation & length of time to get to clinic: No     Call back adelaide today at Phone [phone] (ok to add to chart)     Recommendations:   Per Patricia Mantilla CNP okay for IVF/Pain today.     0734 Appt available in [facility] infusion for 9:30am,  isn't available for assist with transportation, but could assist with ride home.  Adelaide in agreement about getting ride to clinic and requires assist for ride home.     0704 Message sent to  for them to review when they start shift closer to 8:30am to assist with transportation.     0752 Scheduling request sent to add on to pt schedule.           If you do not hear from the infusion center by 2pm then you will not be able to get in for an infusion today. If symptoms worsen while waiting for call back, and/or you experience fever, chills, SOB, chest pain, cough, n/v, dizziness, numbness, swelling, discoloration of extremities, then seek emergency evaluation in Emergency Department.     Please note, if you are late for your appt, you risk losing your infusion appt as it may delay another patient's infusion who  arrived on time.

## 2024-05-23 NOTE — PATIENT INSTRUCTIONS
EDUCATION POST BIOLOGICAL/CHEMOTHERAPY INFUSION  Call the triage nurse at your clinic or seek medical attention if you have chills and/or temperature greater than or equal to 100.5, uncontrolled nausea/vomiting, diarrhea, constipation, dizziness, shortness of breath, chest pain, heart palpitations, weakness or any other new or concerning symptoms, questions or concerns.  You can not have any live virus vaccines prior to or during treatment or up to 6 months post infusion.  If you have an upcoming surgery, medical procedure or dental procedure during treatment, this should be discussed with your ordering physician and your surgeon/dentist.  If you are having any concerning symptom, if you are unsure if you should get your next infusion or wish to speak to a provider before your next infusion, please call your care coordinator or triage nurse at your clinic to notify them so we can adequately serve you.      [facility] Triage and after hours / weekends / holidays:  [phone]    Please call the triage or after hours line if you experience a temperature greater than or equal to 100.4, shaking chills, have uncontrolled nausea, vomiting and/or diarrhea, dizziness, shortness of breath, chest pain, bleeding, unexplained bruising, or if you have any other new/concerning symptoms, questions or concerns.      If you are having any concerning symptoms or wish to speak to a provider before your next infusion visit, please call triage to notify them so we can adequately serve you.     If you need a refill on a narcotic prescription or other medication, please call before your infusion appointment.           May 2024      [place] Monday Tuesday Wednesday Thursday Friday Saturday                  1    BMT INFUSION 3 HR (180 MIN)  12:00 PM   (180 min.)    BMT INFUSION NURSE   [facility] [place] 2     3    MR ABDOMEN WO   7:15 AM   (45 min.)   UUMR1   [facility]  Imaging    RETURN CCSL   1:45 PM   (45 min.)   Patricia Mantilla CNP   [facility] 4       5     6    BMT INFUSION 3 HR (180 MIN)  10:00 AM   (180 min.)   [facility] BMT INFUSION NURSE   [facility] [place] 7     8    BMT INFUSION 3 HR (180 MIN)  10:00 AM   (180 min.)   UC BMT INFUSION NURSE   [facility] [place] 9    BMT INFUSION 2 HR (120 MIN)   9:00 AM   (120 min.)   UC BMT INFUSION NURSE   [facility] [place] 10     11       12     13    BMT INFUSION 3 HR (180 MIN)  11:00 AM   (180 min.)   UC BMT INFUSION NURSE   [facility] [place] 14     15    BMT INFUSION 3 HR (180 MIN)  11:00 AM   (180 min.)   UC BMT INFUSION NURSE   [facility] [place] 16    Admission  11:15 AM   Nadia Philippe MD   [facility] Orthopedic   (Discharge: 5/20/2024)    CT ABDOMEN PELVIS WO   5:20 PM   (20 min.)   URCT1   [facility] Imaging    US ABD LTD W ABD/PEL DUP COMP   5:55 PM   (60 min.)   URUS2   [facility] 17     18       19     20    NEW GI NEXT AVAILABLE   8:45 AM   (60 min.)   María Garcia PA-C   [facility] [place] 21    BMT INFUSION 3 HR (180 MIN)   9:30 AM   (180 min.)   UC BMT INFUSION NURSE   [facility] [place] 22    Admission  12:27 AM   [facility] Emergency Department   (Discharge: 5/22/2024) 23    ONC INFUSION 3 HR (180 MIN)   9:30 AM   (180 min.)    ONC INFUSION NURSE   [facility] 24    LAB CENTRAL   9:00 AM   (15 min.)    [facility]   [facility]    RETURN ACTIVE TREATMENT   9:15 AM   (45 min.)   Patricia Mantilla CNP   [facility] 25        26     27     28     29     30     31 June 2024 Sunday Monday Tuesday Wednesday Thursday Friday Saturday                                 1       2     3     4     5     6     7     8       9     10     11     12     13     14    LAB CENTRAL  10:00 AM   (15 min.)   [facility]   [facility]    RETURN ACTIVE TREATMENT  10:15 AM   (45 min.)   Patricia Mantilla CNP   [facility]    ONC INFUSION 4 HR (240 MIN)  11:30 AM   (240 min.)    ONC INFUSION NURSE   [facility] 15       16     17     18     19     20     21    [facility] PHYSICAL   1:45 PM   (30 min.)   Suraj Case MD   [facility] [place] 22       23     24     25     26     27     28    NEUROTOXIN   7:45 AM   (60 min.)   Katherine Pierce MD   [facility] 29       30                                                No results found for this or any previous visit (from the past 24 hour(s)).

## 2024-05-24 ENCOUNTER — ONCOLOGY VISIT (OUTPATIENT)
Dept: ONCOLOGY | Facility: CLINIC | Age: 24
End: 2024-05-24
Attending: REGISTERED NURSE
Payer: COMMERCIAL

## 2024-05-24 ENCOUNTER — APPOINTMENT (OUTPATIENT)
Dept: LAB | Facility: CLINIC | Age: 24
End: 2024-05-24
Attending: REGISTERED NURSE
Payer: COMMERCIAL

## 2024-05-24 VITALS
WEIGHT: 154.9 LBS | RESPIRATION RATE: 16 BRPM | TEMPERATURE: 98.9 F | HEART RATE: 103 BPM | BODY MASS INDEX: 26.59 KG/M2 | SYSTOLIC BLOOD PRESSURE: 127 MMHG | DIASTOLIC BLOOD PRESSURE: 81 MMHG | OXYGEN SATURATION: 95 %

## 2024-05-24 DIAGNOSIS — D57.1 HB-SS DISEASE WITHOUT CRISIS (H): Primary | ICD-10-CM

## 2024-05-24 DIAGNOSIS — E83.111 IRON OVERLOAD DUE TO REPEATED RED BLOOD CELL TRANSFUSIONS: ICD-10-CM

## 2024-05-24 PROCEDURE — G2211 COMPLEX E/M VISIT ADD ON: HCPCS | Performed by: REGISTERED NURSE

## 2024-05-24 PROCEDURE — 99417 PROLNG OP E/M EACH 15 MIN: CPT | Performed by: REGISTERED NURSE

## 2024-05-24 PROCEDURE — 250N000011 HC RX IP 250 OP 636: Performed by: REGISTERED NURSE

## 2024-05-24 PROCEDURE — G0463 HOSPITAL OUTPT CLINIC VISIT: HCPCS | Performed by: REGISTERED NURSE

## 2024-05-24 PROCEDURE — 99215 OFFICE O/P EST HI 40 MIN: CPT | Performed by: REGISTERED NURSE

## 2024-05-24 RX ORDER — HEPARIN SODIUM (PORCINE) LOCK FLUSH IV SOLN 100 UNIT/ML 100 UNIT/ML
5 SOLUTION INTRAVENOUS ONCE
Status: COMPLETED | OUTPATIENT
Start: 2024-05-24 | End: 2024-05-24

## 2024-05-24 RX ORDER — HEPARIN SODIUM (PORCINE) LOCK FLUSH IV SOLN 100 UNIT/ML 100 UNIT/ML
500 SOLUTION INTRAVENOUS ONCE
Status: DISCONTINUED | OUTPATIENT
Start: 2024-05-24 | End: 2024-05-24 | Stop reason: HOSPADM

## 2024-05-24 RX ADMIN — Medication 5 ML: at 09:46

## 2024-05-24 ASSESSMENT — PAIN SCALES - GENERAL: PAINLEVEL: EXTREME PAIN (9)

## 2024-05-24 NOTE — NURSING NOTE
"Oncology Rooming Note    May 24, 2024 9:47 AM   Jennifer Cervantes is a 24 year old female who presents for:    Chief Complaint   Patient presents with    Blood Draw     Port blood draw with heparin flush by lab RN    Oncology Clinic Visit     Sickle cell pain crisis     Initial Vitals: /81   Pulse 103   Temp 98.9  F (37.2  C) (Oral)   Resp 16   Wt 70.3 kg (154 lb 14.4 oz)   LMP  (LMP Unknown)   SpO2 95%   BMI 26.59 kg/m   Estimated body mass index is 26.59 kg/m  as calculated from the following:    Height as of 5/22/24: 1.626 m (5' 4\").    Weight as of this encounter: 70.3 kg (154 lb 14.4 oz). Body surface area is 1.78 meters squared.  Extreme Pain (9) Comment: Data Unavailable   No LMP recorded (lmp unknown). Patient has had an implant.  Allergies reviewed: Yes  Medications reviewed: Yes    Medications: Medication refills not needed today.  Pharmacy name entered into [email or web address]: [place], [place] - [facility] [phone]    Frailty Screening:   Is the patient here for a new oncology consult visit in cancer care? 2. No      Clinical concerns: None       Ashley Whiting CMA            "

## 2024-05-24 NOTE — NURSING NOTE
Chief Complaint   Patient presents with    Blood Draw     Port blood draw with heparin flush by lab RN    Oncology Clinic Visit     Sickle cell pain crisis       Port accessed with blood draw and heparin flush by lab RN. Vitals taken and next appointment arrived.    Patricia Brower RN

## 2024-05-24 NOTE — Clinical Note
5/24/2024         RE: Jennifer Cervantes  [address]  [place] [address]        Dear Colleague,    Thank you for referring your patient, Jennifer Cervantes, to the [facility]. Please see a copy of my visit note below.    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 She had multiple thromboembolic events in 2021 despite adherent anticoagulation use (though warfarin was perpetually low) and there are concerns for chronic thromboembolic disease but did not have pulmonary HTN on a November 2021 cath. She is maintained on chronic PO opioids and twice-weekly infusion visits (since 1/24/22) but has been able to be maintained on this regimen and has stayed out of the ED most of the time with even rarer admissions for most of 2023.     Interval History:  ***    Sickle Cell Disease Comprehensive Checklist  Bone Health/Avascular Necrosis Screening/Symptoms (each visit): no new concerns today  Leg Ulcer evaluation (every visit): no  Hypertension (every visit):stable 11/3/23  Last pulmonary evaluation (asthma, AMAN, pulm HTN): 9/28/22, due for follow-up  Stroke/silent cerebral infarct Hx (Y/N): Yes TIA ~2014, first event ~age 1 with full stroke and R sided weakness  Last PCP Visit: 3/6/23  Vaccines:  PCV13: 5/13/19  Pneumovax (PPSV23): 3/04, 10/09, 7/12/19 (next due 7/2024)  Menactra: 4/2010, 9/2015 (MCV done 8/16/21)  MenB: 9/16/15, 5/13/19  Influenza: 10/2/23  Audiology (chelation): done 2/27/24  Comparison to Previous Audiogram dated 6/6/2022:      Pure Tone Thresholds 250-8000 Hz:    RIGHT: stable    LEFT: stable     High Frequency Audiometry 9,000-16,000Hz:     RIGHT: stable    LEFT: stable     Word Recognition Score:    RIGHT: stable    LEFT: stable    Plan last reviewed with patient: 10/2/23    Patient background: 24 yo F, enjoys movies and kids though there are times where she does not really want to talk to people. Does not have a lot of social support at home.     Sickle Cell Disease History  Primary Hematologist Team: Jose Rafael Duncan  PCP: none  Genotype: SS  Acute Pain Crisis Treatment: (limiting IV)   ER   Dilaudid 1 mg IV q1h up to 3 doses  Toradol 30 mg IV x1   Maintenance IV fluids with LR  Other: Zofran 8 mg IV PRN nausea  Inpatient:  PCA Dilaudid 1 hr q30 minutes, no basal rate  Toradol 30 mg x6h x 4 hr  LR maintenance x 1-2 days  Other Medications: Zofran  ASA  Supportive Care: Docusate, Senna  Chronic Pain Medications:    Home regimen: oxycodone 15 mg p.o. q.4-6 hours p.r.n. breakthrough pain.  She also continues on Voltaren gel, and Zoloft among other medications.    -Also benefits from mental health visits, acupuncture  Baseline Hemoglobin: 7 g/dl without chronic transfusions  Hydroxyurea use: Yes  H/O blood transfusions: Yes, several (iron overload) Most recent 11/20/2021  H/O Transfusion Reactions: no  Antibodies:none  H/O Acute Chest Syndrome: Yes  Last episode:9/05/22 (previously 4/26/21, 10/2019)   ICU/intubation: not with 9/2022 admission  H/O Stroke: Yes (managed with chronic transfusions in the past, stopped late Spring 2020)  H/O VTE: Yes (2/2021)  H/O Cholecystectomy or Splenectomy: no  H/O Asthma, Pulm HTN, AVN, Leg Ulcers, Nephropathy, Retinopathy, etc: Iron overload, asthma, chronic lung disease, physical limitations from early stroke    ---------------------------------------  Jennifer Cervantes's Goals (reviewed today 5/3/24)    1-3 month goal:  Look for a new job    6 month goal:      12 month goal:      Disease-specific  goal(s):  Decrease frequency of ED visits. Decrease opioid qty by 5 tablets with next refill.      Current Outpatient Medications   Medication Sig Dispense Refill    acetaminophen (TYLENOL) 325 MG tablet Take 3 tablets (975 mg) by mouth every 8 hours 270 tablet 0    albuterol (PROAIR HFA/PROVENTIL HFA/VENTOLIN HFA) 108 (90 Base) MCG/ACT inhaler Inhale 2 puffs into the lungs every 6 hours as needed for shortness of breath or wheezing 8.5 g 3    albuterol (PROVENTIL) (2.5 MG/3ML) 0.083% neb solution Take 2 vials (5 mg) by nebulization every 6 hours as needed for shortness of breath or wheezing 90 mL 3    aspirin (ASA) 81 MG chewable tablet Take 1 tablet (81 mg) by mouth 2 times daily 60 tablet 11    budesonide-formoterol (SYMBICORT) 160-4.5 MCG/ACT Inhaler Inhale 2 puffs twice daily plus 1-2 puffs as needed. May use up to 12 puffs per day. 20.4 g 11    deferasirox (JADENU) 360 MG tablet Take 4 tablets (1,440 mg) by mouth every evening 120 tablet 4    EPINEPHrine (ANY BX GENERIC EQUIV) 0.3 MG/0.3ML injection 2-pack Inject 0.3 mLs (0.3 mg) into the muscle as needed for anaphylaxis 1 each 1    Hydroxyurea 1000 MG TABS Take 3,000 mg by mouth daily 90 tablet 3    ibuprofen (ADVIL/MOTRIN) 600 MG tablet Take 600 mg by mouth every 6 hours as needed for moderate pain Using rarely, 2x/week at most      ibuprofen (ADVIL/MOTRIN) 800 MG tablet Take 800 mg by mouth every 8 hours as needed for moderate pain      melatonin 5 MG tablet Take 1 tablet (5 mg) by mouth nightly as needed for sleep 30 tablet 1    methocarbamol (ROBAXIN) 750 MG tablet Take 1 tablet (750 mg) by mouth 4 times daily as needed for muscle spasms (during sickle pain crises. Okay to take scheduled while in pain) 60 tablet 1    naloxone (NARCAN) 4 MG/0.1ML nasal spray Spray 1 spray (4 mg) into one nostril alternating nostrils as needed for opioid reversal every 2-3 minutes until assistance arrives 0.2 mL 0    naloxone (NARCAN) 4 MG/0.1ML nasal spray Spray 4 mg into  one nostril alternating nostrils as needed for opioid reversal every 2-3 minutes until assistance arrives      omeprazole (PRILOSEC) 20 MG DR capsule Take 1 capsule (20 mg) by mouth daily 30 capsule 0    ondansetron (ZOFRAN) 8 MG tablet Take 1 tablet (8 mg) by mouth every 8 hours as needed for nausea 30 tablet 1    oxyCODONE IR (ROXICODONE) 10 MG tablet Take 1 tablet (10 mg) by mouth every 4 hours as needed for severe pain or breakthrough pain Goal 4 per day. Max 6 per day. 20 tablet 0       Past Medical History  Past Medical History:   Diagnosis Date    Anxiety     Bleeding disorder (H24)     Blood clotting disorder (H24)     Cerebral infarction (H) 2015    Cognitive developmental delay     low IQ. 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 side (H)     right hand contractures    Iron overload due to repeated red blood cell transfusions     Migraines     Multiple subsegmental pulmonary emboli without acute cor pulmonale (H) 02/01/2021    Oppositional defiant behavior     Presence of intrauterine contraceptive device 2/18/2020    Superficial venous thrombosis of arm, right 03/25/2021    Uncomplicated asthma      Past Surgical History:   Procedure Laterality Date    AS INSERT TUNNELED CV 2 CATH W/O PORT/PUMP      CHOLECYSTECTOMY      CV RIGHT HEART CATH MEASUREMENTS RECORDED N/A 11/18/2021    Procedure: Right Heart Cath;  Surgeon: Jackson Stauffer MD;  Location:  HEART CARDIAC CATH LAB    INSERT PORT VASCULAR ACCESS Left 4/21/2021    Procedure: INSERTION, VASCULAR ACCESS PORT (NOT SURE ON SIDE UNTIL REMOVAL);  Surgeon: Rajan More MD;  Location: [facility] OR    IR CHEST PORT PLACEMENT > 5 YRS OF AGE  4/21/2021    IR CVC NON TUNNEL LINE REMOVAL  5/6/2021    IR CVC NON TUNNEL PLACEMENT > 5 YRS  04/07/2020    IR CVC NON TUNNEL PLACEMENT > 5 YRS  4/30/2021    IR CVC NON TUNNEL PLACEMENT > 5 YRS   9/7/2022    IR PORT REMOVAL LEFT  4/21/2021    REMOVE PORT VASCULAR ACCESS Left 4/21/2021    Procedure: REMOVAL, VASCULAR ACCESS PORT LEFT;  Surgeon: Rajan More MD;  Location: [facility] OR    REPAIR TENDON ELBOW Right 10/02/2019    Procedure: Right Elbow Flexor Lengthening, Flexor Pronator Slide Of Wrist and Finger, Thumb Adductor Lengthening;  Surgeon: Anai Franco MD;  Location: [facility] OR    TONSILLECTOMY Bilateral 10/02/2019    Procedure: Bilateral Tonsillectomy;  Surgeon: Farhana Guy MD;  Location: [facility] OR    ZC BREAST REDUCTION (INCLUDES LIPO) TIER 3 Bilateral 04/23/2019     Allergies   Allergen Reactions    Contrast Dye      Hives and breathing issues    Fish-Derived Products Hives    Seafood Hives    Adhesive Tape Hives     Primipore dressing causes hives    Gadolinium     Iodinated Contrast Media      Social History   Social History     Tobacco Use    Smoking status: Never     Passive exposure: Never    Smokeless tobacco: Never   Substance Use Topics    Alcohol use: Not Currently     Alcohol/week: 0.0 standard drinks of alcohol    Drug use: Never   Her mom lives next door to her.  Past medical history and social history were reviewed.    Physical Examination:  LMP  (LMP Unknown)       Wt Readings from Last 10 Encounters:   05/22/24 68 kg (150 lb)   05/03/24 67.9 kg (149 lb 12.8 oz)   04/30/24 67.4 kg (148 lb 9.4 oz)   04/19/24 69.3 kg (152 lb 12.8 oz)   04/18/24 68.5 kg (151 lb 0.2 oz)   04/11/24 70.2 kg (154 lb 12.8 oz)   04/08/24 69 kg (152 lb 1.6 oz)   04/05/24 68 kg (150 lb)   03/22/24 67.6 kg (149 lb)   03/18/24 69 kg (152 lb 3.2 oz)     General: Pleasant female, NAD  Eyes: EOMI, PERRL  Respiratory: Normal effort, no adventitious breath sounds  Ext: no peripheral edema  Neurologic: Grossly nonfocal. A/O x 4.  Skin: No rashes, petechiae, or bruising noted on exposed skin.   Laboratory Data:  Most Recent 3 CBC's:  Recent Labs   Lab Test 05/22/24  0124 05/19/24  0755 05/17/24  0658  05/16/24  1218 04/26/24  0814 04/24/24  0656   WBC 10.6 11.8* 11.6* 16.6*   < > 8.4   HGB 8.2* 6.8* 7.4* 6.6*   < > 7.7*   MCV 90 87 87 88   < > 86    438 406 476*   < > 379   ANEUTAUTO 7.5  --   --  12.6*  --  4.1    < > = values in this interval not displayed.    Most Recent 3 BMP's:  Recent Labs   Lab Test 05/22/24  0124 05/17/24  0658 05/16/24  1218    141 138   POTASSIUM 3.8 4.2 4.2   CHLORIDE 106 107 106   CO2 24 26 25   BUN 6.4 6.6 10.9   CR 0.59 0.57 0.56   ANIONGAP 12 8 7   MICAH 8.4* 8.3* 8.6   GLC 89 86 86   PROTTOTAL 7.0 6.5 6.6   ALBUMIN 4.4 4.1 4.2    Most Recent 2 LFT's:  Recent Labs   Lab Test 05/22/24  0124 05/17/24  0658   AST 73* 36   ALT 81* 20   ALKPHOS 65 54   BILITOTAL 3.0* 2.6*    Most Recent TSH and T4:  Recent Labs   Lab Test 04/23/24  1754   TSH 0.60     Phos/Mag:  Lab Results   Component Value Date    PHOS 4.8 (H) 05/13/2023    PHOS 5.0 (H) 05/12/2023    PHOS 3.6 02/21/2021    MAG 2.0 05/16/2024    MAG 1.7 04/29/2024    MAG 1.9 04/28/2024      I reviewed the above labs today.    Assessment and Plan:  1. Sickle Cell HgbSS Disease  2. Acute pain crises  3. Chronic Pain  4. 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 History of CVA  7. Hearing loss  8. Nausea/vomiting     Jennifer is seen today for hospital follow-up after admission 4/23-4/29 for hypoxia, moderate persistent asthma with possible exacerbation and sickle cell pain. She has had recent discussion with our entire care team (Arely FORDE, Dr. Duncan, me) and feels good about the level of communication and respect exhibited at this time. She has no immediate plan to change care providers.     She continues to struggle with chronic pain, noting opioid use is less effective than usual. She has been working on acknowledging the pain and working through this despite achieving previous levels of relief. We again reviewed the ultimate goal of reducing ED visits and overall oral  opioid use. Jennifer has done a great job of avoiding any increase in oral oxycodone over the past few months although her ED utilization has increased. She inquires about coming to infusion 3 times per week instead of her current limitation of twice weekly infusions. I agreed to further discuss this with Dr. Duncan although we candidly spoke about how this plays a role in her overall opioid use. Although this may limit ED utilization, it may not necessarily contribute to decrease in opioids since she would be simply be receiving the same medication in a different setting. Jennifer verbalizes clear understanding of this and continues to work on creative ways to achieve her overall goal. She was clear that she would still like to decrease her oral oxycodone quantity in a few weeks to 15 tablets/fill rather than 20 tablets/fill despite the decision made about infusion visits.     I provided a final work letter today and advised that we would not be providing additional work notes as it is impossible to predict her ability to work based on the chronicity of her symptoms. She is actively looking for a new role that may better fit her current lifestyle and health needs.    She remains on Jadenu but has been taking a break from deferiprone for a few months now. Repeat Ferriscan was performed this morning showing and increase in LIC now at 31.8 compared to 28.5 in September 2023. I will also be discussing possible resumption of deferiprone with Dr. Duncan.    She has not yet scheduled and EGD but continues to have persistent nausea and appetite changes as well as intermittent epigastric pain. I would like her to pursue this to further evaluate for possible gastritis or ulcers. Currently she is scheduled for a GI visit (virtual) 5/20 which I encourage her to attend.    -------------------------------------  Plan:  -GI consult 5/20 for evaluation of persistent n/v and epigastric pain  -Continue Hydrea to 3000mg daily to help  lessen frequency of sickle cell pain.   -Continue monthly crizanlizumab infusions.  -Continue slow taper of oxycodone. Currently receiving oxycodone 10 mg every 4 hours PRN, qty 20. Plan to tapering to qty 15 in about 2 weeks. I'll see her prior to this.  -She can self-reduce infusion days/week and we will continue to keep the cap at 2/week for now. I'll discuss a possible increase to 3/week with Dr. Duncan.  -Continue infusion center visits limited to two times per week (Mondays and Fridays ideally although still needs to call to request). Continue diligent home management with current medications, heat, rest, compression, warm baths. Methocarbamol was recently added which Jennifer is utilizing and finds helpful.  -If unable to manage at home can go to ED  with continued plan to use IV Dilaudid and take a break from ketamine (10/2/23). No PCA use and goal for any admission would still be to discharge by 5 days or less  - Continue Jadenu. Will consider deferiprone based on 5/3 Ferriscan results.  -Continue aspirin BID  -RTC in 2 weeks, coordinate with sidney infusion    Patricia Mantilla CNP  ---  *** minutes spent on the date of the encounter doing {2021 E&M time in:425868}.      The longitudinal plan of care for the diagnosis(es)/condition(s) as documented were addressed during this visit. Due to the added complexity in care, I will continue to support Jennifer in the subsequent management and with ongoing continuity of care.                    Again, thank you for allowing me to participate in the care of your patient.        Sincerely,        Patricia Mantilla CNP

## 2024-05-28 ENCOUNTER — NURSE TRIAGE (OUTPATIENT)
Dept: ONCOLOGY | Facility: CLINIC | Age: 24
End: 2024-05-28
Payer: COMMERCIAL

## 2024-05-28 ENCOUNTER — INFUSION THERAPY VISIT (OUTPATIENT)
Dept: INFUSION THERAPY | Facility: CLINIC | Age: 24
End: 2024-05-28
Attending: PEDIATRICS
Payer: COMMERCIAL

## 2024-05-28 VITALS
SYSTOLIC BLOOD PRESSURE: 123 MMHG | HEART RATE: 99 BPM | RESPIRATION RATE: 16 BRPM | DIASTOLIC BLOOD PRESSURE: 76 MMHG | TEMPERATURE: 98 F | OXYGEN SATURATION: 99 %

## 2024-05-28 DIAGNOSIS — D57.00 SICKLE CELL PAIN CRISIS (H): ICD-10-CM

## 2024-05-28 DIAGNOSIS — D57.00 SICKLE CELL PAIN CRISIS (H): Primary | ICD-10-CM

## 2024-05-28 DIAGNOSIS — G81.10 SPASTIC HEMIPLEGIA, UNSPECIFIED ETIOLOGY, UNSPECIFIED LATERALITY (H): ICD-10-CM

## 2024-05-28 PROCEDURE — 96361 HYDRATE IV INFUSION ADD-ON: CPT

## 2024-05-28 PROCEDURE — 96374 THER/PROPH/DIAG INJ IV PUSH: CPT

## 2024-05-28 PROCEDURE — 250N000011 HC RX IP 250 OP 636: Performed by: PEDIATRICS

## 2024-05-28 PROCEDURE — 96376 TX/PRO/DX INJ SAME DRUG ADON: CPT

## 2024-05-28 PROCEDURE — 258N000003 HC RX IP 258 OP 636: Performed by: PEDIATRICS

## 2024-05-28 PROCEDURE — 96375 TX/PRO/DX INJ NEW DRUG ADDON: CPT

## 2024-05-28 PROCEDURE — 250N000013 HC RX MED GY IP 250 OP 250 PS 637: Performed by: PEDIATRICS

## 2024-05-28 RX ORDER — ONDANSETRON 2 MG/ML
8 INJECTION INTRAMUSCULAR; INTRAVENOUS EVERY 6 HOURS PRN
Status: CANCELLED
Start: 2024-07-01

## 2024-05-28 RX ORDER — ONDANSETRON 4 MG/1
8 TABLET, FILM COATED ORAL
Status: CANCELLED
Start: 2024-07-01

## 2024-05-28 RX ORDER — HEPARIN SODIUM,PORCINE 10 UNIT/ML
5 VIAL (ML) INTRAVENOUS
Status: CANCELLED | OUTPATIENT
Start: 2024-07-01

## 2024-05-28 RX ORDER — KETOROLAC TROMETHAMINE 30 MG/ML
30 INJECTION, SOLUTION INTRAMUSCULAR; INTRAVENOUS ONCE
Status: COMPLETED | OUTPATIENT
Start: 2024-05-28 | End: 2024-05-28

## 2024-05-28 RX ORDER — HEPARIN SODIUM (PORCINE) LOCK FLUSH IV SOLN 100 UNIT/ML 100 UNIT/ML
5 SOLUTION INTRAVENOUS
Status: CANCELLED | OUTPATIENT
Start: 2024-07-01

## 2024-05-28 RX ORDER — ONDANSETRON 2 MG/ML
8 INJECTION INTRAMUSCULAR; INTRAVENOUS EVERY 6 HOURS PRN
Status: DISCONTINUED | OUTPATIENT
Start: 2024-05-28 | End: 2024-05-28 | Stop reason: HOSPADM

## 2024-05-28 RX ORDER — DIPHENHYDRAMINE HCL 25 MG
50 CAPSULE ORAL
Status: CANCELLED
Start: 2024-07-01

## 2024-05-28 RX ORDER — HEPARIN SODIUM (PORCINE) LOCK FLUSH IV SOLN 100 UNIT/ML 100 UNIT/ML
5 SOLUTION INTRAVENOUS
Status: DISCONTINUED | OUTPATIENT
Start: 2024-05-28 | End: 2024-05-28 | Stop reason: HOSPADM

## 2024-05-28 RX ORDER — DIPHENHYDRAMINE HCL 25 MG
50 CAPSULE ORAL
Status: COMPLETED | OUTPATIENT
Start: 2024-05-28 | End: 2024-05-28

## 2024-05-28 RX ORDER — OXYCODONE HYDROCHLORIDE 10 MG/1
10 TABLET ORAL EVERY 4 HOURS PRN
Qty: 20 TABLET | Refills: 0 | Status: SHIPPED | OUTPATIENT
Start: 2024-05-28 | End: 2024-06-03

## 2024-05-28 RX ORDER — KETOROLAC TROMETHAMINE 30 MG/ML
30 INJECTION, SOLUTION INTRAMUSCULAR; INTRAVENOUS ONCE
Status: CANCELLED
Start: 2024-07-01 | End: 2024-07-01

## 2024-05-28 RX ADMIN — HYDROMORPHONE HYDROCHLORIDE 1 MG: 1 INJECTION, SOLUTION INTRAMUSCULAR; INTRAVENOUS; SUBCUTANEOUS at 15:04

## 2024-05-28 RX ADMIN — HYDROMORPHONE HYDROCHLORIDE 1 MG: 1 INJECTION, SOLUTION INTRAMUSCULAR; INTRAVENOUS; SUBCUTANEOUS at 15:57

## 2024-05-28 RX ADMIN — HYDROMORPHONE HYDROCHLORIDE 1 MG: 1 INJECTION, SOLUTION INTRAMUSCULAR; INTRAVENOUS; SUBCUTANEOUS at 14:10

## 2024-05-28 RX ADMIN — SODIUM CHLORIDE, POTASSIUM CHLORIDE, SODIUM LACTATE AND CALCIUM CHLORIDE 1000 ML: 600; 310; 30; 20 INJECTION, SOLUTION INTRAVENOUS at 14:06

## 2024-05-28 RX ADMIN — ONDANSETRON 8 MG: 2 INJECTION INTRAMUSCULAR; INTRAVENOUS at 14:11

## 2024-05-28 RX ADMIN — Medication 5 ML: at 16:07

## 2024-05-28 RX ADMIN — DIPHENHYDRAMINE HYDROCHLORIDE 50 MG: 25 CAPSULE ORAL at 14:09

## 2024-05-28 RX ADMIN — KETOROLAC TROMETHAMINE 30 MG: 30 INJECTION, SOLUTION INTRAMUSCULAR; INTRAVENOUS at 14:18

## 2024-05-28 ASSESSMENT — PAIN SCALES - GENERAL: PAINLEVEL: WORST PAIN (10)

## 2024-05-28 NOTE — TELEPHONE ENCOUNTER
Sip[facility] can take at 2pm.     Call placed to Jennifer and she can make it to the 2pm appointment     Message sent to [facility] to schedule.

## 2024-05-28 NOTE — TELEPHONE ENCOUNTER
Narcotic Refill Request    Medication(s) requested:  oxycodone   Person Requesting Refill:Jennifer   What pain is the medication treating: Sickle cell pain   How is the medication being taken?:every 6 hours   Does pt have enough for today? All out of medication   Is pain being adequately controlled on the current regimen?: yes   Experiencing any side effects from medication?: none     Date of most recent appointment:  5/24/24   Any No Show Visits:No   Next appointment:   6/14/24 Farhan Mantilla   Last fill date and by whom:  farhan mantilla 5/20/24    Reviewed: No access     Send to provider: Farhan Mantilla and Arely Krueger

## 2024-05-28 NOTE — TELEPHONE ENCOUNTER
Oncology Nurse Triage - Sickle Cell Pain Crisis:    Situation: Jennifer  calling about Sickle Cell Pain Crisis, requesting to be added on for IV fluids and pain medicine    Background:     Patient's last infusion was 5/23/24  Last clinic visit date:5/24/24  Does patient have active treatment plan?  Yes      Assessment of Symptoms:  Onset/Duration of symptoms: 4 day    Is it typical sickle cell pain? Yes   Location: lower back   Character: Sharp           Intensity: 10/10    Any radiation of pain, numbness, tingling, weakness, warmth, swelling, discoloration of arms or legs?No     Fever?No  (if yes max temperature recorded in last 24 hours):      Chest Pain Present: No     Shortness of breath: No     Other home therapies tried: HEAT/HEATING PAD and WARM BATH     Last home medication taken and when: oxycodone last night     Any Refills Needed?: No     Does patient have transportation & length of time to get to clinic: Yes         Recommendations:     Placed on infusion call list     If you do not hear from the infusion center by 2pm then you will not be able to get in for an infusion today. If symptoms worsen while waiting for call back, and/or you experience fever, chills, SOB, chest pain, cough, n/v, dizziness, numbness, swelling, discoloration of extremities, then seek emergency evaluation in Emergency Department.     Please note, if you are late for your appt, you risk losing your infusion appt as it may delay another patient's infusion who arrived on time.

## 2024-05-28 NOTE — PATIENT INSTRUCTIONS
Dear Jennifer Cervantes    Thank you for choosing [facility] ([facility]) for your infusion.  The following information is a summary of our appointment as well as important reminders.      If you are a transplant patient and require transplant education, please click on this link: [email or web address].    We look forward in seeing you on your next appointment here at [facility] ([facility]).  Please don t hesitate to call us at [phone] to reschedule any of your appointments or to speak with one of the [facility] registered nurses.  It was a pleasure taking care of you today.    Sincerely,    [facility]  [facility]  [address] [place] [address]  [id number]  Phone:  [phone]

## 2024-05-28 NOTE — PROGRESS NOTES
Infusion Nursing Note:  Jennifer Cervantes presents today for IVF/pain meds.    Patient seen by provider today: No   present during visit today: Not Applicable.    Note: Patient presents with 10/10 sickle cell pain. Patient received 1mg dilaudid X 3 doses, toradol, zofran, benadryl. At discharge patient rates pain 5/10 which patient states is tolerable to go home.    Intravenous Access:  Implanted Port.    Treatment Conditions:  Not Applicable.    Post Infusion Assessment:  Patient tolerated infusion without incident.  Blood return noted pre and post infusion.  Site patent and intact, free from redness, edema or discomfort.  No evidence of extravasations.  Access discontinued per protocol.     Discharge Plan:   Discharge instructions reviewed with: Patient.  Patient and/or family verbalized understanding of discharge instructions and all questions answered.  AVS to patient via [employer].  Patient will return PRN for next appointment.   Patient discharged in stable condition accompanied by: self.  Departure Mode: Ambulatory.    Administrations This Visit       diphenhydrAMINE (BENADRYL) capsule 50 mg       Admin Date  05/28/2024 Action  $Given Dose  50 mg Route  Oral Documented By  Claudia Landry RN              heparin 100 unit/mL injection 5 mL       Admin Date  05/28/2024 Action  $Given Dose  5 mL Route  Intracatheter Documented By  Claudia Landry RN              HYDROmorphone (DILAUDID) injection 1 mg       Admin Date  05/28/2024 Action  $Given Dose  1 mg Route  Intravenous Documented By  Claudia Landry RN               Admin Date  05/28/2024 Action  $Given Dose  1 mg Route  Intravenous Documented By  Claudia Landry RN               Admin Date  05/28/2024 Action  $Given Dose  1 mg Route  Intravenous Documented By  Claudia Landry RN              ketorolac (TORADOL) injection 30 mg       Admin Date  05/28/2024 Action  $Given Dose  30 mg Route  Intravenous Documented By  Claudia Landry RN               lactated ringers BOLUS 1,000 mL       Admin Date  05/28/2024 Action  $New Bag Dose  1,000 mL Rate  500 mL/hr Route  Intravenous Documented By  Claudia Landry, RN              ondansetron (ZOFRAN) injection 8 mg       Admin Date  05/28/2024 Action  $Given Dose  8 mg Route  Intravenous Documented By  Claudia Landry, RN                    Claudia Landry, RN

## 2024-05-30 ENCOUNTER — MYC MEDICAL ADVICE (OUTPATIENT)
Dept: ONCOLOGY | Facility: CLINIC | Age: 24
End: 2024-05-30

## 2024-05-30 ENCOUNTER — NURSE TRIAGE (OUTPATIENT)
Dept: ONCOLOGY | Facility: CLINIC | Age: 24
End: 2024-05-30
Payer: COMMERCIAL

## 2024-05-30 ENCOUNTER — PATIENT OUTREACH (OUTPATIENT)
Dept: CARE COORDINATION | Facility: CLINIC | Age: 24
End: 2024-05-30
Payer: COMMERCIAL

## 2024-05-30 ENCOUNTER — INFUSION THERAPY VISIT (OUTPATIENT)
Dept: TRANSPLANT | Facility: CLINIC | Age: 24
End: 2024-05-30
Attending: PEDIATRICS
Payer: COMMERCIAL

## 2024-05-30 ENCOUNTER — TELEPHONE (OUTPATIENT)
Dept: ONCOLOGY | Facility: CLINIC | Age: 24
End: 2024-05-30
Payer: COMMERCIAL

## 2024-05-30 VITALS
SYSTOLIC BLOOD PRESSURE: 128 MMHG | HEART RATE: 99 BPM | DIASTOLIC BLOOD PRESSURE: 86 MMHG | OXYGEN SATURATION: 97 % | RESPIRATION RATE: 18 BRPM

## 2024-05-30 DIAGNOSIS — D57.00 SICKLE CELL PAIN CRISIS (H): Primary | ICD-10-CM

## 2024-05-30 DIAGNOSIS — E83.111 IRON OVERLOAD DUE TO REPEATED RED BLOOD CELL TRANSFUSIONS: Primary | ICD-10-CM

## 2024-05-30 DIAGNOSIS — G81.10 SPASTIC HEMIPLEGIA, UNSPECIFIED ETIOLOGY, UNSPECIFIED LATERALITY (H): ICD-10-CM

## 2024-05-30 PROCEDURE — 258N000003 HC RX IP 258 OP 636: Performed by: PEDIATRICS

## 2024-05-30 PROCEDURE — 96376 TX/PRO/DX INJ SAME DRUG ADON: CPT

## 2024-05-30 PROCEDURE — 96375 TX/PRO/DX INJ NEW DRUG ADDON: CPT

## 2024-05-30 PROCEDURE — 96374 THER/PROPH/DIAG INJ IV PUSH: CPT

## 2024-05-30 PROCEDURE — 96361 HYDRATE IV INFUSION ADD-ON: CPT

## 2024-05-30 PROCEDURE — 250N000013 HC RX MED GY IP 250 OP 250 PS 637: Performed by: PEDIATRICS

## 2024-05-30 PROCEDURE — 250N000011 HC RX IP 250 OP 636: Performed by: PEDIATRICS

## 2024-05-30 RX ORDER — ONDANSETRON 2 MG/ML
8 INJECTION INTRAMUSCULAR; INTRAVENOUS EVERY 6 HOURS PRN
Status: CANCELLED
Start: 2024-07-01

## 2024-05-30 RX ORDER — HEPARIN SODIUM (PORCINE) LOCK FLUSH IV SOLN 100 UNIT/ML 100 UNIT/ML
5 SOLUTION INTRAVENOUS
Status: CANCELLED | OUTPATIENT
Start: 2024-07-01

## 2024-05-30 RX ORDER — DIPHENHYDRAMINE HCL 25 MG
50 CAPSULE ORAL
Status: CANCELLED
Start: 2024-07-01

## 2024-05-30 RX ORDER — HEPARIN SODIUM (PORCINE) LOCK FLUSH IV SOLN 100 UNIT/ML 100 UNIT/ML
5 SOLUTION INTRAVENOUS ONCE
Status: COMPLETED | OUTPATIENT
Start: 2024-05-30 | End: 2024-05-30

## 2024-05-30 RX ORDER — HEPARIN SODIUM,PORCINE 10 UNIT/ML
5 VIAL (ML) INTRAVENOUS
Status: CANCELLED | OUTPATIENT
Start: 2024-07-01

## 2024-05-30 RX ORDER — KETOROLAC TROMETHAMINE 30 MG/ML
30 INJECTION, SOLUTION INTRAMUSCULAR; INTRAVENOUS ONCE
Status: COMPLETED | OUTPATIENT
Start: 2024-05-30 | End: 2024-05-30

## 2024-05-30 RX ORDER — ONDANSETRON 4 MG/1
8 TABLET, FILM COATED ORAL
Status: CANCELLED
Start: 2024-07-01

## 2024-05-30 RX ORDER — KETOROLAC TROMETHAMINE 30 MG/ML
30 INJECTION, SOLUTION INTRAMUSCULAR; INTRAVENOUS ONCE
Status: CANCELLED
Start: 2024-07-01 | End: 2024-07-01

## 2024-05-30 RX ORDER — DIPHENHYDRAMINE HCL 25 MG
50 CAPSULE ORAL
Status: COMPLETED | OUTPATIENT
Start: 2024-05-30 | End: 2024-05-30

## 2024-05-30 RX ORDER — ONDANSETRON 2 MG/ML
8 INJECTION INTRAMUSCULAR; INTRAVENOUS EVERY 6 HOURS PRN
Status: DISCONTINUED | OUTPATIENT
Start: 2024-05-30 | End: 2024-05-30 | Stop reason: HOSPADM

## 2024-05-30 RX ADMIN — Medication 5 ML: at 15:26

## 2024-05-30 RX ADMIN — HYDROMORPHONE HYDROCHLORIDE 1 MG: 1 INJECTION, SOLUTION INTRAMUSCULAR; INTRAVENOUS; SUBCUTANEOUS at 13:25

## 2024-05-30 RX ADMIN — DIPHENHYDRAMINE HYDROCHLORIDE 50 MG: 25 CAPSULE ORAL at 12:21

## 2024-05-30 RX ADMIN — HYDROMORPHONE HYDROCHLORIDE 1 MG: 1 INJECTION, SOLUTION INTRAMUSCULAR; INTRAVENOUS; SUBCUTANEOUS at 12:20

## 2024-05-30 RX ADMIN — HYDROMORPHONE HYDROCHLORIDE 1 MG: 1 INJECTION, SOLUTION INTRAMUSCULAR; INTRAVENOUS; SUBCUTANEOUS at 14:24

## 2024-05-30 RX ADMIN — SODIUM CHLORIDE, POTASSIUM CHLORIDE, SODIUM LACTATE AND CALCIUM CHLORIDE 1000 ML: 600; 310; 30; 20 INJECTION, SOLUTION INTRAVENOUS at 12:17

## 2024-05-30 RX ADMIN — ONDANSETRON 8 MG: 2 INJECTION INTRAMUSCULAR; INTRAVENOUS at 12:20

## 2024-05-30 RX ADMIN — KETOROLAC TROMETHAMINE 30 MG: 30 INJECTION, SOLUTION INTRAMUSCULAR; INTRAVENOUS at 12:21

## 2024-05-30 NOTE — TELEPHONE ENCOUNTER
BMT can take at noon     Call placed to Jennifer left message for her with appt time     CCOD notified to schedule appt.

## 2024-05-30 NOTE — TELEPHONE ENCOUNTER
Oncology Nurse Triage - Sickle Cell Pain Crisis:    Situation: Jennifer  calling about Sickle Cell Pain Crisis, requesting to be added on for IV fluids and pain medicine    Background:     Patient's last infusion was 5/28/24   Last clinic visit date:5/24/24 Patricia Mantilla   Does patient have active treatment plan?  Yes      Assessment of Symptoms:  Onset/Duration of symptoms: 2 day    Is it typical sickle cell pain? Yes   Location: generalized   Character: Sharp           Intensity: 8/10    Any radiation of pain, numbness, tingling, weakness, warmth, swelling, discoloration of arms or legs?No     Fever?No  (if yes max temperature recorded in last 24 hours):      Chest Pain Present: No     Shortness of breath: No     Other home therapies tried: HEAT/HEATING PAD and WARM BATH     Last home medication taken and when: muscle relaxer last nite     Any Refills Needed?: No     Does patient have transportation & length of time to get to clinic: Yes 10 mins         Recommendations:     Placed on infusion call list     If you do not hear from the infusion center by 2pm then you will not be able to get in for an infusion today. If symptoms worsen while waiting for call back, and/or you experience fever, chills, SOB, chest pain, cough, n/v, dizziness, numbness, swelling, discoloration of extremities, then seek emergency evaluation in Emergency Department.     Please note, if you are late for your appt, you risk losing your infusion appt as it may delay another patient's infusion who arrived on time.

## 2024-05-30 NOTE — PROGRESS NOTES
Social Work - Transportation  [facility]    Data/Intervention:  Patient Name: Jennifer Cervantes Goes By: Jennifer    /Age: 2000 (24 year old)    [facility]  Reason for Referral:  support requested for patient transportation needs for today's appointment.  Assessment:  called [employer] to arrange ride one way ride home. Patient will need to call [phone] ([employer]) when ready for return ride home.  Plan: Patient is aware of the transportation plan.  available to assist with any other needs.    CARLOS Chavez,LGSW  Hematology/Oncology Social Worker  Phone:[phone] Pager: [phone]'

## 2024-05-30 NOTE — PROGRESS NOTES
Infusion Nursing Note:  Jennifer Cervantes presents today for IVF and pain meds.    Patient seen by provider today: No   present during visit today: Not Applicable.    Note: Jennifer here today feeling okay, having 7/10 pain, generalized. Port accessed. Pt received 1L LR bolus over 2hrs, 30mg IV toradol, 8mg IV zofran, 50mg PO benadryl, total 1mg x 3 doses IV dilaudid. Pt tolerated infusion without difficulty, symptoms improved on discharge.      Intravenous Access:  Implanted Port.    Treatment Conditions:  Not Applicable.      Post Infusion Assessment:  Patient tolerated infusion without incident.  Blood return noted pre and post infusion.  Site patent and intact, free from redness, edema or discomfort.  No evidence of extravasations.  Access discontinued per protocol.       Discharge Plan:   Patient discharged in stable condition accompanied by: self.  Departure Mode: Ambulatory.      Allison Bowman RN

## 2024-05-30 NOTE — TELEPHONE ENCOUNTER
PA Initiation    Medication: DEFERIPRONE (TWICE DAILY) 1000 MG PO TABS  Insurance Company: [employer] - Phone [phone] Fax [phone]  Pharmacy Filling the Rx: [facility] - [place], [place] - [address]  Filling Pharmacy Phone:    Filling Pharmacy Fax:    Start Date: 5/30/2024          Thank you,    Carole Ritchie  Oncology Pharmacy Liaison [email or web address]  Phone: [phone]  Fax: [phone]

## 2024-05-31 NOTE — TELEPHONE ENCOUNTER
Prior Authorization Approval    Medication: DEFERIPRONE (TWICE DAILY) 1000 MG PO TABS  Authorization Effective Date: 5/1/2024  Authorization Expiration Date: 5/31/2025  Approved Dose/Quantity: 150 per 30 DS  Reference #: [id number]   Insurance Company: [employer] - Phone [phone] Fax [phone]  Expected CoPay: $ 0  CoPay Card Available:      Financial Assistance Needed:   Which Pharmacy is filling the prescription: [facility] - [place] [address]   Pharmacy Notified:   Patient Notified:           Thank you,    Carole Ritchie  Oncology Pharmacy Liaison II  [email or web address]  Phone: [phone]  Fax: [phone]

## 2024-06-01 ENCOUNTER — HOSPITAL ENCOUNTER (EMERGENCY)
Facility: CLINIC | Age: 24
Discharge: HOME | End: 2024-06-01
Attending: EMERGENCY MEDICINE | Admitting: EMERGENCY MEDICINE
Payer: COMMERCIAL

## 2024-06-01 VITALS
HEIGHT: 64 IN | TEMPERATURE: 97.9 F | BODY MASS INDEX: 26.46 KG/M2 | SYSTOLIC BLOOD PRESSURE: 103 MMHG | RESPIRATION RATE: 16 BRPM | OXYGEN SATURATION: 96 % | HEART RATE: 80 BPM | WEIGHT: 155 LBS | DIASTOLIC BLOOD PRESSURE: 63 MMHG

## 2024-06-01 DIAGNOSIS — D57.00 SICKLE CELL PAIN CRISIS (H): ICD-10-CM

## 2024-06-01 LAB
ALBUMIN SERPL BCG-MCNC: 4.7 G/DL (ref 3.5–5.2)
ALP SERPL-CCNC: 66 U/L (ref 40–150)
ALT SERPL W P-5'-P-CCNC: 28 U/L (ref 0–50)
ANION GAP SERPL CALCULATED.3IONS-SCNC: 11 MMOL/L (ref 7–15)
AST SERPL W P-5'-P-CCNC: 40 U/L (ref 0–45)
BASOPHILS # BLD AUTO: 0.3 10E3/UL (ref 0–0.2)
BASOPHILS NFR BLD AUTO: 2 %
BILIRUB SERPL-MCNC: 1.9 MG/DL
BUN SERPL-MCNC: 6.2 MG/DL (ref 6–20)
CALCIUM SERPL-MCNC: 9.2 MG/DL (ref 8.6–10)
CHLORIDE SERPL-SCNC: 102 MMOL/L (ref 98–107)
CREAT SERPL-MCNC: 0.49 MG/DL (ref 0.51–0.95)
DEPRECATED HCO3 PLAS-SCNC: 25 MMOL/L (ref 22–29)
EGFRCR SERPLBLD CKD-EPI 2021: >90 ML/MIN/1.73M2
EOSINOPHIL # BLD AUTO: 0.3 10E3/UL (ref 0–0.7)
EOSINOPHIL NFR BLD AUTO: 2 %
ERYTHROCYTE [DISTWIDTH] IN BLOOD BY AUTOMATED COUNT: 20.1 % (ref 10–15)
GLUCOSE SERPL-MCNC: 93 MG/DL (ref 70–99)
HCT VFR BLD AUTO: 22.9 % (ref 35–47)
HGB BLD-MCNC: 7.7 G/DL (ref 11.7–15.7)
IMM GRANULOCYTES # BLD: 0.1 10E3/UL
IMM GRANULOCYTES NFR BLD: 0 %
LYMPHOCYTES # BLD AUTO: 3.2 10E3/UL (ref 0.8–5.3)
LYMPHOCYTES NFR BLD AUTO: 27 %
MCH RBC QN AUTO: 29.1 PG (ref 26.5–33)
MCHC RBC AUTO-ENTMCNC: 33.6 G/DL (ref 31.5–36.5)
MCV RBC AUTO: 86 FL (ref 78–100)
MONOCYTES # BLD AUTO: 1.1 10E3/UL (ref 0–1.3)
MONOCYTES NFR BLD AUTO: 9 %
NEUTROPHILS # BLD AUTO: 7.1 10E3/UL (ref 1.6–8.3)
NEUTROPHILS NFR BLD AUTO: 60 %
NRBC # BLD AUTO: 0.1 10E3/UL
NRBC BLD AUTO-RTO: 1 /100
PLATELET # BLD AUTO: 609 10E3/UL (ref 150–450)
POTASSIUM SERPL-SCNC: 3.7 MMOL/L (ref 3.4–5.3)
PROT SERPL-MCNC: 7.9 G/DL (ref 6.4–8.3)
RBC # BLD AUTO: 2.65 10E6/UL (ref 3.8–5.2)
RETICS # AUTO: 0.36 10E6/UL (ref 0.03–0.1)
RETICS/RBC NFR AUTO: 13.8 % (ref 0.5–2)
SODIUM SERPL-SCNC: 138 MMOL/L (ref 135–145)
WBC # BLD AUTO: 11.9 10E3/UL (ref 4–11)

## 2024-06-01 PROCEDURE — 85025 COMPLETE CBC W/AUTO DIFF WBC: CPT | Performed by: EMERGENCY MEDICINE

## 2024-06-01 PROCEDURE — 36415 COLL VENOUS BLD VENIPUNCTURE: CPT | Performed by: EMERGENCY MEDICINE

## 2024-06-01 PROCEDURE — 85045 AUTOMATED RETICULOCYTE COUNT: CPT | Performed by: EMERGENCY MEDICINE

## 2024-06-01 PROCEDURE — 96376 TX/PRO/DX INJ SAME DRUG ADON: CPT | Performed by: EMERGENCY MEDICINE

## 2024-06-01 PROCEDURE — 96374 THER/PROPH/DIAG INJ IV PUSH: CPT | Performed by: EMERGENCY MEDICINE

## 2024-06-01 PROCEDURE — 99284 EMERGENCY DEPT VISIT MOD MDM: CPT | Mod: 25 | Performed by: EMERGENCY MEDICINE

## 2024-06-01 PROCEDURE — 96361 HYDRATE IV INFUSION ADD-ON: CPT | Performed by: EMERGENCY MEDICINE

## 2024-06-01 PROCEDURE — 250N000011 HC RX IP 250 OP 636: Performed by: EMERGENCY MEDICINE

## 2024-06-01 PROCEDURE — 258N000003 HC RX IP 258 OP 636: Performed by: EMERGENCY MEDICINE

## 2024-06-01 PROCEDURE — 96375 TX/PRO/DX INJ NEW DRUG ADDON: CPT | Performed by: EMERGENCY MEDICINE

## 2024-06-01 PROCEDURE — 80053 COMPREHEN METABOLIC PANEL: CPT | Performed by: EMERGENCY MEDICINE

## 2024-06-01 PROCEDURE — 99285 EMERGENCY DEPT VISIT HI MDM: CPT | Performed by: EMERGENCY MEDICINE

## 2024-06-01 RX ORDER — KETOROLAC TROMETHAMINE 30 MG/ML
30 INJECTION, SOLUTION INTRAMUSCULAR; INTRAVENOUS ONCE
Status: COMPLETED | OUTPATIENT
Start: 2024-06-01 | End: 2024-06-01

## 2024-06-01 RX ORDER — HEPARIN SODIUM (PORCINE) LOCK FLUSH IV SOLN 100 UNIT/ML 100 UNIT/ML
5-10 SOLUTION INTRAVENOUS
Status: DISCONTINUED | OUTPATIENT
Start: 2024-06-01 | End: 2024-06-02 | Stop reason: HOSPADM

## 2024-06-01 RX ORDER — ONDANSETRON 2 MG/ML
8 INJECTION INTRAMUSCULAR; INTRAVENOUS ONCE
Status: COMPLETED | OUTPATIENT
Start: 2024-06-01 | End: 2024-06-01

## 2024-06-01 RX ORDER — HEPARIN SODIUM,PORCINE 10 UNIT/ML
5-10 VIAL (ML) INTRAVENOUS
Status: DISCONTINUED | OUTPATIENT
Start: 2024-06-01 | End: 2024-06-02 | Stop reason: HOSPADM

## 2024-06-01 RX ORDER — HEPARIN SODIUM,PORCINE 10 UNIT/ML
5-10 VIAL (ML) INTRAVENOUS EVERY 24 HOURS
Status: DISCONTINUED | OUTPATIENT
Start: 2024-06-01 | End: 2024-06-02 | Stop reason: HOSPADM

## 2024-06-01 RX ADMIN — ONDANSETRON 8 MG: 2 INJECTION INTRAMUSCULAR; INTRAVENOUS at 19:59

## 2024-06-01 RX ADMIN — HYDROMORPHONE HYDROCHLORIDE 1 MG: 1 INJECTION, SOLUTION INTRAMUSCULAR; INTRAVENOUS; SUBCUTANEOUS at 21:01

## 2024-06-01 RX ADMIN — HYDROMORPHONE HYDROCHLORIDE 1 MG: 1 INJECTION, SOLUTION INTRAMUSCULAR; INTRAVENOUS; SUBCUTANEOUS at 19:48

## 2024-06-01 RX ADMIN — HEPARIN, PORCINE (PF) 10 UNIT/ML INTRAVENOUS SYRINGE 5 ML: at 23:12

## 2024-06-01 RX ADMIN — HYDROMORPHONE HYDROCHLORIDE 1 MG: 1 INJECTION, SOLUTION INTRAMUSCULAR; INTRAVENOUS; SUBCUTANEOUS at 22:26

## 2024-06-01 RX ADMIN — SODIUM CHLORIDE 1000 ML: 9 INJECTION, SOLUTION INTRAVENOUS at 19:48

## 2024-06-01 RX ADMIN — KETOROLAC TROMETHAMINE 30 MG: 30 INJECTION, SOLUTION INTRAMUSCULAR at 19:47

## 2024-06-01 ASSESSMENT — ACTIVITIES OF DAILY LIVING (ADL)
ADLS_ACUITY_SCORE: 37

## 2024-06-02 NOTE — DISCHARGE INSTRUCTIONS
Follow-up with your hematologist by phone on Monday to let them know how you are doing.    Return to the emergency department for any problems.

## 2024-06-02 NOTE — ED PROVIDER NOTES
[facility] EMERGENCY DEPARTMENT ([facility])    6/01/24      ED PROVIDER NOTE    History     Chief Complaint   Patient presents with    Sickle Cell Pain Crisis     Bilateral legs and left arm pain, pain started this morning at 7AM. Took pain meds no improvement. Pain level 8/10.     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  She reports that this is consistent with past sickle cell pain crises.  She denies fevers, chest pain, shortness of breath or cough.  She did take her home pain medication and tried heat, but this did not help.    Past Medical History  Past Medical History:   Diagnosis Date    Anxiety     Bleeding disorder (H24)     Blood clotting disorder (H24)     Cerebral infarction (H) 2015    Cognitive developmental delay     low IQ. 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 side (H)     right hand contractures    Iron overload due to repeated red blood cell transfusions     Migraines     Multiple subsegmental pulmonary emboli without acute cor pulmonale (H) 02/01/2021    Oppositional defiant behavior     Presence of intrauterine contraceptive device 2/18/2020    Superficial venous thrombosis of arm, right 03/25/2021    Uncomplicated asthma      Past Surgical History:   Procedure Laterality Date    AS INSERT TUNNELED CV 2 CATH W/O PORT/PUMP      CHOLECYSTECTOMY      CV RIGHT HEART CATH MEASUREMENTS RECORDED N/A 11/18/2021    Procedure: Right Heart Cath;  Surgeon: Jackson Stauffer MD;  Location:  HEART CARDIAC CATH LAB    INSERT PORT VASCULAR ACCESS Left 4/21/2021    Procedure: INSERTION, VASCULAR  ACCESS PORT (NOT SURE ON SIDE UNTIL REMOVAL);  Surgeon: Rajan More MD;  Location: [facility] OR    IR CHEST PORT PLACEMENT > 5 YRS OF AGE  4/21/2021    IR CVC NON TUNNEL LINE REMOVAL  5/6/2021    IR CVC NON TUNNEL PLACEMENT > 5 YRS  04/07/2020    IR CVC NON TUNNEL PLACEMENT > 5 YRS  4/30/2021    IR CVC NON TUNNEL PLACEMENT > 5 YRS  9/7/2022    IR PORT REMOVAL LEFT  4/21/2021    REMOVE PORT VASCULAR ACCESS Left 4/21/2021    Procedure: REMOVAL, VASCULAR ACCESS PORT LEFT;  Surgeon: Rajan More MD;  Location: [facility] OR    REPAIR TENDON ELBOW Right 10/02/2019    Procedure: Right Elbow Flexor Lengthening, Flexor Pronator Slide Of Wrist and Finger, Thumb Adductor Lengthening;  Surgeon: Anai Franco MD;  Location: [facility] OR    TONSILLECTOMY Bilateral 10/02/2019    Procedure: Bilateral Tonsillectomy;  Surgeon: Farhana Guy MD;  Location: [facility] OR    ZZC BREAST REDUCTION (INCLUDES LIPO) TIER 3 Bilateral 04/23/2019     acetaminophen (TYLENOL) 325 MG tablet  albuterol (PROAIR HFA/PROVENTIL HFA/VENTOLIN HFA) 108 (90 Base) MCG/ACT inhaler  albuterol (PROVENTIL) (2.5 MG/3ML) 0.083% neb solution  aspirin (ASA) 81 MG chewable tablet  budesonide-formoterol (SYMBICORT) 160-4.5 MCG/ACT Inhaler  deferasirox (JADENU) 360 MG tablet  Deferiprone, Twice Daily, 1000 MG TABS  EPINEPHrine (ANY BX GENERIC EQUIV) 0.3 MG/0.3ML injection 2-pack  Hydroxyurea 1000 MG TABS  ibuprofen (ADVIL/MOTRIN) 600 MG tablet  ibuprofen (ADVIL/MOTRIN) 800 MG tablet  melatonin 5 MG tablet  methocarbamol (ROBAXIN) 750 MG tablet  naloxone (NARCAN) 4 MG/0.1ML nasal spray  naloxone (NARCAN) 4 MG/0.1ML nasal spray  omeprazole (PRILOSEC) 20 MG DR capsule  ondansetron (ZOFRAN) 8 MG tablet  oxyCODONE IR (ROXICODONE) 10 MG tablet      Allergies   Allergen Reactions    Contrast Dye      Hives and breathing issues    Fish-Derived Products Hives    Seafood Hives    Adhesive Tape Hives     Primipore dressing causes hives    Gadolinium     Iodinated Contrast  "Media      Family History  Family History   Problem Relation Age of Onset    Sickle Cell Trait Mother     Hypertension Mother     Asthma Mother     Sickle Cell Trait Father     Glaucoma No family hx of     Macular Degeneration No family hx of     Diabetes No family hx of     Gout No family hx of      Social History   Social History     Tobacco Use    Smoking status: Never     Passive exposure: Never    Smokeless tobacco: Never   Substance Use Topics    Alcohol use: Not Currently     Alcohol/week: 0.0 standard drinks of alcohol    Drug use: Never      Past medical history, past surgical history, medications, allergies, family history, and social history were reviewed with the patient. No additional pertinent items.     A complete review of systems was performed with pertinent positives and negatives noted in the HPI, and all other systems negative.    Physical Exam   BP: 119/78  Pulse: 98  Temp: 98.5  F (36.9  C)  Resp: 16  Height: 162.6 cm (5' 4\")  Weight: 70.3 kg (155 lb)  SpO2: 97 %  Physical Exam  Vitals and nursing note reviewed.   Constitutional:       General: She is not in acute distress.     Appearance: She is well-developed. She is not ill-appearing, toxic-appearing or diaphoretic.   HENT:      Head: Normocephalic and atraumatic.      Mouth/Throat:      Lips: Pink.      Mouth: Mucous membranes are moist.      Pharynx: Oropharynx is clear. No oropharyngeal exudate.   Eyes:      General: Lids are normal. No scleral icterus.     Extraocular Movements: Extraocular movements intact.      Right eye: No nystagmus.      Left eye: No nystagmus.      Pupils: Pupils are equal, round, and reactive to light.      Comments: Conjunctival pallor noted   Neck:      Thyroid: No thyromegaly.      Vascular: No JVD.      Trachea: No tracheal deviation.   Cardiovascular:      Rate and Rhythm: Normal rate and regular rhythm.      Pulses: Normal pulses.      Heart sounds: Normal heart sounds. No murmur heard.     No friction rub. " No gallop.   Pulmonary:      Effort: Pulmonary effort is normal. No respiratory distress.      Breath sounds: Normal breath sounds.   Chest:      Comments: Left upper chest port site without erythema, swelling, bleeding or discharge.  Abdominal:      General: Bowel sounds are normal. There is no distension.      Palpations: Abdomen is soft. There is no mass.      Tenderness: There is no abdominal tenderness. There is no guarding or rebound.   Musculoskeletal:         General: No tenderness. Normal range of motion.      Cervical back: Normal range of motion and neck supple. No erythema or rigidity.      Right lower leg: No edema.      Left lower leg: No edema.   Lymphadenopathy:      Cervical: No cervical adenopathy.   Skin:     General: Skin is warm and dry.      Capillary Refill: Capillary refill takes less than 2 seconds.      Coloration: Skin is not pale.      Findings: No erythema or rash.   Neurological:      Mental Status: She is alert and oriented to person, place, and time.      Cranial Nerves: No cranial nerve deficit.      Motor: Weakness present.      Comments: Right-sided weakness noted, this is chronic and secondary to past stroke.   Psychiatric:         Mood and Affect: Mood and affect normal.         Speech: Speech normal.         Behavior: Behavior normal.           ED Course, Procedures, & Data      Procedures                Results for orders placed or performed during the hospital encounter of 06/01/24   Comprehensive metabolic panel     Status: Abnormal   Result Value Ref Range    Sodium 138 135 - 145 mmol/L    Potassium 3.7 3.4 - 5.3 mmol/L    Carbon Dioxide (CO2) 25 22 - 29 mmol/L    Anion Gap 11 7 - 15 mmol/L    Urea Nitrogen 6.2 6.0 - 20.0 mg/dL    Creatinine 0.49 (L) 0.51 - 0.95 mg/dL    GFR Estimate >90 >60 mL/min/1.73m2    Calcium 9.2 8.6 - 10.0 mg/dL    Chloride 102 98 - 107 mmol/L    Glucose 93 70 - 99 mg/dL    Alkaline Phosphatase 66 40 - 150 U/L    AST 40 0 - 45 U/L    ALT 28 0 - 50  U/L    Protein Total 7.9 6.4 - 8.3 g/dL    Albumin 4.7 3.5 - 5.2 g/dL    Bilirubin Total 1.9 (H) <=1.2 mg/dL   Reticulocyte count     Status: Abnormal   Result Value Ref Range    % Reticulocyte 13.8 (H) 0.5 - 2.0 %    Absolute Reticulocyte 0.364 (H) 0.025 - 0.095 10e6/uL   CBC with platelets and differential     Status: Abnormal   Result Value Ref Range    WBC Count 11.9 (H) 4.0 - 11.0 10e3/uL    RBC Count 2.65 (L) 3.80 - 5.20 10e6/uL    Hemoglobin 7.7 (L) 11.7 - 15.7 g/dL    Hematocrit 22.9 (L) 35.0 - 47.0 %    MCV 86 78 - 100 fL    MCH 29.1 26.5 - 33.0 pg    MCHC 33.6 31.5 - 36.5 g/dL    RDW 20.1 (H) 10.0 - 15.0 %    Platelet Count 609 (H) 150 - 450 10e3/uL    % Neutrophils 60 %    % Lymphocytes 27 %    % Monocytes 9 %    % Eosinophils 2 %    % Basophils 2 %    % Immature Granulocytes 0 %    NRBCs per 100 WBC 1 (H) <1 /100    Absolute Neutrophils 7.1 1.6 - 8.3 10e3/uL    Absolute Lymphocytes 3.2 0.8 - 5.3 10e3/uL    Absolute Monocytes 1.1 0.0 - 1.3 10e3/uL    Absolute Eosinophils 0.3 0.0 - 0.7 10e3/uL    Absolute Basophils 0.3 (H) 0.0 - 0.2 10e3/uL    Absolute Immature Granulocytes 0.1 <=0.4 10e3/uL    Absolute NRBCs 0.1 10e3/uL   CBC with platelets differential     Status: Abnormal    Narrative    The following orders were created for panel order CBC with platelets differential.  Procedure                               Abnormality         Status                     ---------                               -----------         ------                     CBC with platelets and d...[[id number]]  Abnormal            Final result                 Please view results for these tests on the individual orders.     Medications   HYDROmorphone (DILAUDID) injection 1 mg (1 mg Intravenous $Given 6/1/24 3731)   sodium chloride 0.9% BOLUS 1,000 mL (0 mLs Intravenous Stopped 6/1/24 2119)   ketorolac (TORADOL) injection 30 mg (30 mg Intravenous $Given 6/1/24 1947)   ondansetron (ZOFRAN) injection 8 mg (8 mg Intravenous $Given 6/1/24  1959)     Labs Ordered and Resulted from Time of ED Arrival to Time of ED Departure   COMPREHENSIVE METABOLIC PANEL - Abnormal       Result Value    Sodium 138      Potassium 3.7      Carbon Dioxide (CO2) 25      Anion Gap 11      Urea Nitrogen 6.2      Creatinine 0.49 (*)     GFR Estimate >90      Calcium 9.2      Chloride 102      Glucose 93      Alkaline Phosphatase 66      AST 40      ALT 28      Protein Total 7.9      Albumin 4.7      Bilirubin Total 1.9 (*)    RETICULOCYTE COUNT - Abnormal    % Reticulocyte 13.8 (*)     Absolute Reticulocyte 0.364 (*)    CBC WITH PLATELETS AND DIFFERENTIAL - Abnormal    WBC Count 11.9 (*)     RBC Count 2.65 (*)     Hemoglobin 7.7 (*)     Hematocrit 22.9 (*)     MCV 86      MCH 29.1      MCHC 33.6      RDW 20.1 (*)     Platelet Count 609 (*)     % Neutrophils 60      % Lymphocytes 27      % Monocytes 9      % Eosinophils 2      % Basophils 2      % Immature Granulocytes 0      NRBCs per 100 WBC 1 (*)     Absolute Neutrophils 7.1      Absolute Lymphocytes 3.2      Absolute Monocytes 1.1      Absolute Eosinophils 0.3      Absolute Basophils 0.3 (*)     Absolute Immature Granulocytes 0.1      Absolute NRBCs 0.1       No orders to display              Assessment & Plan       This patient presented to the emergency department with symptoms consistent with an acute sickle cell pain crisis. Her pain is similar to the past crisises she has had. There is no evidence for osteomyelitis, acute infectious process, acute chest, stroke, or aplastic crisis.  She was given several doses of dilaudid while in the emergency department and did feel improved.  After third dose of Dilaudid, she reported that she felt improved to the point where she was comfortable going home.  She was discharged with instructions for care and follow-up in good condition.      I have reviewed the nursing notes. I have reviewed the findings, diagnosis, plan and need for follow up with the patient.    New Prescriptions     No medications on file       Final diagnoses:   Sickle cell pain crisis (H)       Isael Cruz MD  formerly [facility] EMERGENCY DEPARTMENT  6/1/2024     Isael Cruz MD  06/01/24 0650

## 2024-06-03 ENCOUNTER — NURSE TRIAGE (OUTPATIENT)
Dept: ONCOLOGY | Facility: CLINIC | Age: 24
End: 2024-06-03
Payer: COMMERCIAL

## 2024-06-03 ENCOUNTER — INFUSION THERAPY VISIT (OUTPATIENT)
Dept: ONCOLOGY | Facility: CLINIC | Age: 24
End: 2024-06-03
Attending: PEDIATRICS
Payer: COMMERCIAL

## 2024-06-03 ENCOUNTER — PATIENT OUTREACH (OUTPATIENT)
Dept: ONCOLOGY | Facility: CLINIC | Age: 24
End: 2024-06-03
Payer: COMMERCIAL

## 2024-06-03 DIAGNOSIS — D57.00 SICKLE CELL PAIN CRISIS (H): ICD-10-CM

## 2024-06-03 DIAGNOSIS — D57.00 SICKLE CELL PAIN CRISIS (H): Primary | ICD-10-CM

## 2024-06-03 DIAGNOSIS — G81.10 SPASTIC HEMIPLEGIA, UNSPECIFIED ETIOLOGY, UNSPECIFIED LATERALITY (H): ICD-10-CM

## 2024-06-03 PROCEDURE — 96361 HYDRATE IV INFUSION ADD-ON: CPT

## 2024-06-03 PROCEDURE — 250N000011 HC RX IP 250 OP 636: Performed by: PEDIATRICS

## 2024-06-03 PROCEDURE — 96376 TX/PRO/DX INJ SAME DRUG ADON: CPT

## 2024-06-03 PROCEDURE — 96374 THER/PROPH/DIAG INJ IV PUSH: CPT

## 2024-06-03 PROCEDURE — 258N000003 HC RX IP 258 OP 636: Performed by: PEDIATRICS

## 2024-06-03 PROCEDURE — 96375 TX/PRO/DX INJ NEW DRUG ADDON: CPT

## 2024-06-03 PROCEDURE — 250N000013 HC RX MED GY IP 250 OP 250 PS 637: Performed by: PEDIATRICS

## 2024-06-03 RX ORDER — KETOROLAC TROMETHAMINE 30 MG/ML
30 INJECTION, SOLUTION INTRAMUSCULAR; INTRAVENOUS ONCE
Status: COMPLETED | OUTPATIENT
Start: 2024-06-03 | End: 2024-06-03

## 2024-06-03 RX ORDER — KETOROLAC TROMETHAMINE 30 MG/ML
30 INJECTION, SOLUTION INTRAMUSCULAR; INTRAVENOUS ONCE
Status: CANCELLED
Start: 2024-07-01 | End: 2024-07-01

## 2024-06-03 RX ORDER — ONDANSETRON 2 MG/ML
8 INJECTION INTRAMUSCULAR; INTRAVENOUS EVERY 6 HOURS PRN
Status: DISCONTINUED | OUTPATIENT
Start: 2024-06-03 | End: 2024-06-03 | Stop reason: HOSPADM

## 2024-06-03 RX ORDER — HEPARIN SODIUM (PORCINE) LOCK FLUSH IV SOLN 100 UNIT/ML 100 UNIT/ML
5 SOLUTION INTRAVENOUS
Status: CANCELLED | OUTPATIENT
Start: 2024-07-01

## 2024-06-03 RX ORDER — OXYCODONE HYDROCHLORIDE 10 MG/1
10 TABLET ORAL EVERY 4 HOURS PRN
Qty: 20 TABLET | Refills: 0 | Status: SHIPPED | OUTPATIENT
Start: 2024-06-03 | End: 2024-06-10

## 2024-06-03 RX ORDER — ONDANSETRON 2 MG/ML
8 INJECTION INTRAMUSCULAR; INTRAVENOUS EVERY 6 HOURS PRN
Status: CANCELLED
Start: 2024-07-01

## 2024-06-03 RX ORDER — ONDANSETRON 4 MG/1
8 TABLET, FILM COATED ORAL
Status: CANCELLED
Start: 2024-07-01

## 2024-06-03 RX ORDER — HEPARIN SODIUM (PORCINE) LOCK FLUSH IV SOLN 100 UNIT/ML 100 UNIT/ML
5 SOLUTION INTRAVENOUS
Status: DISCONTINUED | OUTPATIENT
Start: 2024-06-03 | End: 2024-06-03 | Stop reason: HOSPADM

## 2024-06-03 RX ORDER — DIPHENHYDRAMINE HCL 25 MG
50 CAPSULE ORAL
Status: CANCELLED
Start: 2024-07-01

## 2024-06-03 RX ORDER — DIPHENHYDRAMINE HCL 25 MG
50 CAPSULE ORAL
Status: COMPLETED | OUTPATIENT
Start: 2024-06-03 | End: 2024-06-03

## 2024-06-03 RX ORDER — HEPARIN SODIUM,PORCINE 10 UNIT/ML
5 VIAL (ML) INTRAVENOUS
Status: CANCELLED | OUTPATIENT
Start: 2024-07-01

## 2024-06-03 RX ADMIN — HYDROMORPHONE HYDROCHLORIDE 1 MG: 1 INJECTION, SOLUTION INTRAMUSCULAR; INTRAVENOUS; SUBCUTANEOUS at 14:19

## 2024-06-03 RX ADMIN — KETOROLAC TROMETHAMINE 30 MG: 30 INJECTION, SOLUTION INTRAMUSCULAR; INTRAVENOUS at 13:18

## 2024-06-03 RX ADMIN — HYDROMORPHONE HYDROCHLORIDE 1 MG: 1 INJECTION, SOLUTION INTRAMUSCULAR; INTRAVENOUS; SUBCUTANEOUS at 13:20

## 2024-06-03 RX ADMIN — ONDANSETRON 8 MG: 2 INJECTION INTRAMUSCULAR; INTRAVENOUS at 13:15

## 2024-06-03 RX ADMIN — SODIUM CHLORIDE, POTASSIUM CHLORIDE, SODIUM LACTATE AND CALCIUM CHLORIDE 1000 ML: 600; 310; 30; 20 INJECTION, SOLUTION INTRAVENOUS at 13:14

## 2024-06-03 RX ADMIN — HYDROMORPHONE HYDROCHLORIDE 1 MG: 1 INJECTION, SOLUTION INTRAMUSCULAR; INTRAVENOUS; SUBCUTANEOUS at 15:17

## 2024-06-03 RX ADMIN — DIPHENHYDRAMINE HYDROCHLORIDE 50 MG: 25 CAPSULE ORAL at 13:16

## 2024-06-03 RX ADMIN — Medication 5 ML: at 15:51

## 2024-06-03 NOTE — PROGRESS NOTES
[facility]: Cancer Care                                                                                        Infusion nurse reached out to RNCC asking if I could contact pt to follow-up on some things at home bothering pt.  RNCC tried both phones and left messages on both lines.  Unable to reach.       Signature:  Arely Krueger RN

## 2024-06-03 NOTE — PROGRESS NOTES
Infusion Nursing Note:  Jennifer Cervantes presents today for IVF and pain medication.    Patient seen by provider today: No    Note: Pt presents to clinic with c/o pain in lower back, aching 8/10.  Her pain goal today is 5/10 which was reached by time of discharge.  Pt did not request or require any intervention for pain today aside from medications as adminisered.    Pt tearful in clinic, expressed stress at home w/ personal familial relationships.  Pt requested RONALDO Sr or Dr. Duncan come to room, however, they are unavailable today.  RNCC, Arely MARTIN, offered to call, but pt then states she may not answer call.  Active listening, offered SW services, assistance with psychological referral for communication and supportive tools [pt states already has this scheduled].  Pt denies that she feels like hurting herself or others.  Friend to room to offer support as well.      Intravenous Access:  Implanted Port.    Treatment Conditions:  Not Applicable.    Post Infusion Assessment:  Patient tolerated infusion without incident.  Blood return noted pre and post infusion.  Site patent and intact, free from redness, edema or discomfort.  No evidence of extravasations.  Access discontinued per protocol.    Discharge Plan:   Prescription refills given for oxycodone.  Discharge instructions reviewed with: Patient.  Patient and/or family verbalized understanding of discharge instructions and all questions answered.  AVS to patient via [employer].  Patient will return 6/14/2024 for next appointment.   Patient discharged in stable condition accompanied by: self.  Departure Mode: Ambulatory.    Lupe Bolton RN

## 2024-06-03 NOTE — TELEPHONE ENCOUNTER
Oncology Nurse Triage - Sickle Cell Pain Crisis:  Situation: Jennifer  calling about Sickle Cell Pain Crisis, requesting to be added on for IV fluids and pain medicine    Background:   Patient's last infusion was 5/30/24  Last clinic visit date:5/24/24 with Patricia Mantilla CNP  Does patient have active treatment plan?  Yes    Assessment of Symptoms:  Onset/Duration of symptoms: 1 day    Is it typical sickle cell pain? Yes   Location: generalized  Character: Sharp           Intensity: 8/10    Any radiation of pain, numbness, tingling, weakness, warmth, swelling, discoloration of arms or legs?No     Fever?No  Chest Pain Present: No   Shortness of breath: No     Other home therapies tried: HEAT/HEATING PAD and WARM BATH   Last home medication taken and when: 0600 ibuprofen and oxycodone    Any Refills Needed?: Yes oxycodone--Pended up in separate refill encounter.    Does patient have transportation & length of time to get to clinic: Yes   10 minutes away and does have a ride.      Recommendations:   If you do not hear from the infusion center by 2pm then you will not be able to get in for an infusion today. If symptoms worsen while waiting for call back, and/or you experience fever, chills, SOB, chest pain, cough, n/v, dizziness, numbness, swelling, discoloration of extremities, then seek emergency evaluation in Emergency Department.     Added to infusion wait list.  Pt voiced understanding.    0737: Per Melissa, they can offer at [address] in [place]  0743: Call made to Jennifer who confirmed she can come to the apt today.   Updated [facility] Charge Nurse.  Message sent to [facility]OD to schedule.    Routed to Care Team.

## 2024-06-03 NOTE — TELEPHONE ENCOUNTER
Narcotic Refill Request  Person Requesting Refill: Jennifer    Medication(s) requested:  oxycodone IR 10 mg tablet  Last fill date and by whom:  5/28/24 by Patricia Mantilla CNP  What pain is the medication treating: sickle cell pain  How is the medication being taken?:Q 6H is what she is trying to do.  Does pt have enough for today? Yes, but she is almost out  Is pain being adequately controlled on the current regimen?: Pain is effectively managed but if pain is unbearable, needs to take more frequently than Q 6 hours.  Experiencing any side effects from medication?: No    Date of most recent appointment:  5/24/24 with Patricia Mantilla CNP  Any No Show Visits: No  Next appointment:   6/12/24 with Patricia Mantilla CNP     Reviewed: No access    Routed/Paged provider: Patricia Mantilla CNP

## 2024-06-03 NOTE — PATIENT INSTRUCTIONS
Contact Numbers    [facility] Main Line/TRIAGE: [phone]    Call with chills and/or temperature greater than or equal to 100.5, uncontrolled nausea/vomiting, diarrhea, constipation, dizziness, shortness of breath, chest pain, bleeding, unexplained bruising, or any new/concerning symptoms, questions/concerns.     If you are having any concerning symptoms or wish to speak to a provider before your next infusion visit, please call your care coordinator or triage to notify them so we can adequately serve you.       After Hours: [phone]    If after hours, weekends, or holidays, call main hospital  and ask for Oncology doctor on call.      June 2024 Sunday Monday Tuesday Wednesday Thursday Friday Saturday                                 1    Admission   7:19 PM   [facility] Emergency Department   (Discharge: 6/1/2024)   2     3    ONC INFUSION 3 HR (180 MIN)   1:00 PM   (180 min.)    ONC INFUSION NURSE   [facility] 4     5     6     7     8       9     10     11     12    RETURN CCSL  11:45 AM   (45 min.)   Patricia Mantilla CNP   [facility] 13     14    LAB CENTRAL  10:45 AM   (15 min.)   [facility] DRAW   [facility]    ONC INFUSION 4 HR (240 MIN)  11:30 AM   (240 min.)    ONC INFUSION NURSE   [facility] 15       16     17     18     19     20     21    [facility] PHYSICAL   1:45 PM   (30 min.)   Suraj Case MD   [facility] Internal Medicine [place] 22       23     24     25     26     27     28    NEUROTOXIN   7:45 AM   (60 min.)   Katherine Pierce MD   [facility] 29 30 July 2024 Sunday Monday Tuesday Wednesday Thursday Friday Saturday        1     2     3    XR CHEST 2 VIEWS  12:25 PM   (20 min.)   [id number]   [facility]  [place]    FULL PFT W-WO MIP/MEP/MVV  12:45 PM   (60 min.)    PFL D   [facility] [place]    FENO   1:15 PM   (30 min.)    PFL B   [facility] [place]    RETURN ASTHMA   2:00 PM   (40 min.)   Jake Harvey MD   [facility] [place] 4     5     6       7     8     9     10     11     12    LAB CENTRAL  11:00 AM   (15 min.)    [facility] DRAW   [facility]    ONC INFUSION 4 HR (240 MIN)  11:30 AM   (240 min.)    ONC INFUSION NURSE   [facility] 13       14     15     16     17     18     19     20       21     22     23     24     25     26     27       28     29     30     31                                    Lab Results:  No results found for this or any previous visit (from the past 12 hour(s)).

## 2024-06-04 ENCOUNTER — NURSE TRIAGE (OUTPATIENT)
Dept: ONCOLOGY | Facility: CLINIC | Age: 24
End: 2024-06-04
Payer: COMMERCIAL

## 2024-06-04 ENCOUNTER — INFUSION THERAPY VISIT (OUTPATIENT)
Dept: ONCOLOGY | Facility: CLINIC | Age: 24
End: 2024-06-04
Attending: INTERNAL MEDICINE
Payer: COMMERCIAL

## 2024-06-04 VITALS
OXYGEN SATURATION: 98 % | TEMPERATURE: 98 F | DIASTOLIC BLOOD PRESSURE: 75 MMHG | HEART RATE: 79 BPM | RESPIRATION RATE: 18 BRPM | SYSTOLIC BLOOD PRESSURE: 117 MMHG

## 2024-06-04 DIAGNOSIS — G81.10 SPASTIC HEMIPLEGIA, UNSPECIFIED ETIOLOGY, UNSPECIFIED LATERALITY (H): ICD-10-CM

## 2024-06-04 DIAGNOSIS — D57.00 SICKLE CELL PAIN CRISIS (H): Primary | ICD-10-CM

## 2024-06-04 PROCEDURE — 96376 TX/PRO/DX INJ SAME DRUG ADON: CPT

## 2024-06-04 PROCEDURE — 250N000011 HC RX IP 250 OP 636: Performed by: PEDIATRICS

## 2024-06-04 PROCEDURE — 250N000013 HC RX MED GY IP 250 OP 250 PS 637: Performed by: PEDIATRICS

## 2024-06-04 PROCEDURE — 258N000003 HC RX IP 258 OP 636: Performed by: PEDIATRICS

## 2024-06-04 PROCEDURE — 96374 THER/PROPH/DIAG INJ IV PUSH: CPT

## 2024-06-04 PROCEDURE — 96375 TX/PRO/DX INJ NEW DRUG ADDON: CPT

## 2024-06-04 RX ORDER — HEPARIN SODIUM (PORCINE) LOCK FLUSH IV SOLN 100 UNIT/ML 100 UNIT/ML
5 SOLUTION INTRAVENOUS
Status: CANCELLED | OUTPATIENT
Start: 2024-07-01

## 2024-06-04 RX ORDER — ONDANSETRON 2 MG/ML
8 INJECTION INTRAMUSCULAR; INTRAVENOUS EVERY 6 HOURS PRN
Status: CANCELLED
Start: 2024-07-01

## 2024-06-04 RX ORDER — HEPARIN SODIUM,PORCINE 10 UNIT/ML
5 VIAL (ML) INTRAVENOUS
Status: CANCELLED | OUTPATIENT
Start: 2024-07-01

## 2024-06-04 RX ORDER — ONDANSETRON 2 MG/ML
8 INJECTION INTRAMUSCULAR; INTRAVENOUS EVERY 6 HOURS PRN
Status: DISCONTINUED | OUTPATIENT
Start: 2024-06-04 | End: 2024-06-04 | Stop reason: HOSPADM

## 2024-06-04 RX ORDER — HEPARIN SODIUM (PORCINE) LOCK FLUSH IV SOLN 100 UNIT/ML 100 UNIT/ML
5 SOLUTION INTRAVENOUS
Status: DISCONTINUED | OUTPATIENT
Start: 2024-06-04 | End: 2024-06-04 | Stop reason: HOSPADM

## 2024-06-04 RX ORDER — DIPHENHYDRAMINE HCL 25 MG
50 CAPSULE ORAL
Status: COMPLETED | OUTPATIENT
Start: 2024-06-04 | End: 2024-06-04

## 2024-06-04 RX ORDER — ONDANSETRON 4 MG/1
8 TABLET, FILM COATED ORAL
Status: CANCELLED
Start: 2024-07-01

## 2024-06-04 RX ORDER — KETOROLAC TROMETHAMINE 30 MG/ML
30 INJECTION, SOLUTION INTRAMUSCULAR; INTRAVENOUS ONCE
Status: COMPLETED | OUTPATIENT
Start: 2024-06-04 | End: 2024-06-04

## 2024-06-04 RX ORDER — DIPHENHYDRAMINE HCL 25 MG
50 CAPSULE ORAL
Status: CANCELLED
Start: 2024-07-01

## 2024-06-04 RX ORDER — KETOROLAC TROMETHAMINE 30 MG/ML
30 INJECTION, SOLUTION INTRAMUSCULAR; INTRAVENOUS ONCE
Status: CANCELLED
Start: 2024-07-01 | End: 2024-07-01

## 2024-06-04 RX ADMIN — ONDANSETRON 8 MG: 2 INJECTION INTRAMUSCULAR; INTRAVENOUS at 12:53

## 2024-06-04 RX ADMIN — DIPHENHYDRAMINE HYDROCHLORIDE 50 MG: 25 CAPSULE ORAL at 12:51

## 2024-06-04 RX ADMIN — KETOROLAC TROMETHAMINE 30 MG: 30 INJECTION, SOLUTION INTRAMUSCULAR; INTRAVENOUS at 12:53

## 2024-06-04 RX ADMIN — HYDROMORPHONE HYDROCHLORIDE 1 MG: 1 INJECTION, SOLUTION INTRAMUSCULAR; INTRAVENOUS; SUBCUTANEOUS at 14:57

## 2024-06-04 RX ADMIN — HYDROMORPHONE HYDROCHLORIDE 1 MG: 1 INJECTION, SOLUTION INTRAMUSCULAR; INTRAVENOUS; SUBCUTANEOUS at 13:51

## 2024-06-04 RX ADMIN — HYDROMORPHONE HYDROCHLORIDE 1 MG: 1 INJECTION, SOLUTION INTRAMUSCULAR; INTRAVENOUS; SUBCUTANEOUS at 12:55

## 2024-06-04 RX ADMIN — SODIUM CHLORIDE, POTASSIUM CHLORIDE, SODIUM LACTATE AND CALCIUM CHLORIDE 1000 ML: 600; 310; 30; 20 INJECTION, SOLUTION INTRAVENOUS at 12:51

## 2024-06-04 RX ADMIN — Medication 5 ML: at 15:05

## 2024-06-04 NOTE — PROGRESS NOTES
Infusion Nursing Note:  Jennifer Cervantes presents today for IVF/pain medication.    Patient seen by provider today: No   present during visit today: Not Applicable.    Note: Patient reports non radiating sharp right side pain with PS 7/10. Denies fever/ chills. Denies chest and abdominal discomfort. No issues with voiding/bowels No new complaints. Patient states that its her usual sickle cell crisis pain.    Patient had 3 doses of Dilaudid with PS 4/10. Patient verbalized knowledge on where and when to call if symptoms persists/worsen.       Intravenous Access:  Implanted Port.    Treatment Conditions:  Not Applicable.      Post Infusion Assessment:  Patient tolerated infusion without incident.  Blood return noted pre and post infusion.  Site patent and intact, free from redness, edema or discomfort.  No evidence of extravasations.  Access discontinued per protocol.       Discharge Plan:   Patient declined prescription refills.  Discharge instructions reviewed with: Patient.  Patient and/or family verbalized understanding of discharge instructions and all questions answered.  AVS to patient via [employer].  Patient will return  6/14 for next appointment.   Patient discharged in stable condition accompanied by: self.  Departure Mode: Ambulatory.      GLORIA YANCEY RN

## 2024-06-04 NOTE — TELEPHONE ENCOUNTER
Oncology Nurse Triage - Sickle Cell Pain Crisis:    Situation: Jennifer  calling about Sickle Cell Pain Crisis, requesting to be added on for IV fluids and pain medicine    Background:     Patient's last infusion was 06/03/24  Last clinic visit date:5/24/24 with Patricia Mantilla CNP  Does patient have active treatment plan?  Yes      Assessment of Symptoms:  Onset/Duration of symptoms: 1 day    Is it typical sickle cell pain? Yes   Location: Right side  Character: Sharp           Intensity: 7/10    Any radiation of pain, numbness, tingling, weakness, warmth, swelling, discoloration of arms or legs?No     Fever?No  (if yes max temperature recorded in last 24 hours):      Chest Pain Present: No     Shortness of breath: No     Other home therapies tried: HEAT/HEATING PAD and WARM BATH     Last home medication taken and when: 0700 oxycodone    Any Refills Needed?: No     Does patient have transportation & length of time to get to clinic: Yes, already at [facility]        Recommendations:     If you do not hear from the infusion center by 2pm then you will not be able to get in for an infusion today. If symptoms worsen while waiting for call back, and/or you experience fever, chills, SOB, chest pain, cough, n/v, dizziness, numbness, swelling, discoloration of extremities, then seek emergency evaluation in Emergency Department.     Please note, if you are late for your appt, you risk losing your infusion appt as it may delay another patient's infusion who arrived on time.            Refill approved as requested.

## 2024-06-05 ENCOUNTER — NURSE TRIAGE (OUTPATIENT)
Dept: ONCOLOGY | Facility: CLINIC | Age: 24
End: 2024-06-05
Payer: COMMERCIAL

## 2024-06-05 DIAGNOSIS — D57.1 HB-SS DISEASE WITHOUT CRISIS (H): Primary | ICD-10-CM

## 2024-06-05 NOTE — TELEPHONE ENCOUNTER
"Oncology Nurse Triage    Situation:   eJnnifer reporting the following symptoms:  - increase fatigue    Background:   Treating Provider:   Dr. Duncan    Date of last office visit: 5/24/2024 Patricia Mantilla CNP    Recent Treatments:last sickle cell IVF/Pain was yesterday 6/5/2024.     Assessment:     Onset: Increase fatigue for past few days.    Pt states is concerned that last Hgb 7.7 on 6/1 was in 7\"s and is concerned about decreasing hemoglobin.    Pt also woke up with a puffier face around eyes, cheeks like worked the night shift but didn't.     Main ask is to recheck labs to recheck hemoglobin?     Recommendations:   Routed to care team.     1346 Per Patricia vargas for pt to get CBC labs this week.    1357 Scheduling request sent to call patient to schedule labs for CBC as approved by provider.          "

## 2024-06-06 ENCOUNTER — NURSE TRIAGE (OUTPATIENT)
Dept: ONCOLOGY | Facility: CLINIC | Age: 24
End: 2024-06-06
Payer: COMMERCIAL

## 2024-06-06 ENCOUNTER — HOSPITAL ENCOUNTER (EMERGENCY)
Facility: CLINIC | Age: 24
Discharge: HOME | End: 2024-06-06
Attending: EMERGENCY MEDICINE | Admitting: EMERGENCY MEDICINE
Payer: COMMERCIAL

## 2024-06-06 ENCOUNTER — APPOINTMENT (OUTPATIENT)
Dept: GENERAL RADIOLOGY | Facility: CLINIC | Age: 24
End: 2024-06-06
Attending: EMERGENCY MEDICINE
Payer: COMMERCIAL

## 2024-06-06 ENCOUNTER — LAB (OUTPATIENT)
Dept: LAB | Facility: CLINIC | Age: 24
End: 2024-06-06
Attending: REGISTERED NURSE
Payer: COMMERCIAL

## 2024-06-06 VITALS
OXYGEN SATURATION: 99 % | DIASTOLIC BLOOD PRESSURE: 84 MMHG | TEMPERATURE: 98.3 F | HEART RATE: 101 BPM | BODY MASS INDEX: 26.61 KG/M2 | RESPIRATION RATE: 16 BRPM | SYSTOLIC BLOOD PRESSURE: 124 MMHG | WEIGHT: 155 LBS

## 2024-06-06 DIAGNOSIS — R07.9 RIGHT-SIDED CHEST PAIN: ICD-10-CM

## 2024-06-06 DIAGNOSIS — D57.1 HB-SS DISEASE WITHOUT CRISIS (H): ICD-10-CM

## 2024-06-06 LAB
ATRIAL RATE - MUSE: 71 BPM
BASOPHILS # BLD AUTO: 0.2 10E3/UL (ref 0–0.2)
BASOPHILS NFR BLD AUTO: 3 %
DIASTOLIC BLOOD PRESSURE - MUSE: NORMAL MMHG
EOSINOPHIL # BLD AUTO: 0.4 10E3/UL (ref 0–0.7)
EOSINOPHIL NFR BLD AUTO: 5 %
ERYTHROCYTE [DISTWIDTH] IN BLOOD BY AUTOMATED COUNT: 21.9 % (ref 10–15)
HCT VFR BLD AUTO: 22.2 % (ref 35–47)
HGB BLD-MCNC: 7.5 G/DL (ref 11.7–15.7)
HOLD SPECIMEN: NORMAL
HOLD SPECIMEN: NORMAL
IMM GRANULOCYTES # BLD: 0 10E3/UL
IMM GRANULOCYTES NFR BLD: 0 %
INTERPRETATION ECG - MUSE: NORMAL
LYMPHOCYTES # BLD AUTO: 1.6 10E3/UL (ref 0.8–5.3)
LYMPHOCYTES NFR BLD AUTO: 18 %
MCH RBC QN AUTO: 29.2 PG (ref 26.5–33)
MCHC RBC AUTO-ENTMCNC: 33.8 G/DL (ref 31.5–36.5)
MCV RBC AUTO: 86 FL (ref 78–100)
MONOCYTES # BLD AUTO: 0.8 10E3/UL (ref 0–1.3)
MONOCYTES NFR BLD AUTO: 8 %
NEUTROPHILS # BLD AUTO: 6.1 10E3/UL (ref 1.6–8.3)
NEUTROPHILS NFR BLD AUTO: 66 %
NRBC # BLD AUTO: 0.2 10E3/UL
NRBC BLD AUTO-RTO: 3 /100
P AXIS - MUSE: 62 DEGREES
PLATELET # BLD AUTO: 582 10E3/UL (ref 150–450)
PR INTERVAL - MUSE: 160 MS
QRS DURATION - MUSE: 76 MS
QT - MUSE: 430 MS
QTC - MUSE: 467 MS
R AXIS - MUSE: 38 DEGREES
RBC # BLD AUTO: 2.57 10E6/UL (ref 3.8–5.2)
RETICS # AUTO: 0.5 10E6/UL (ref 0.03–0.1)
RETICS/RBC NFR AUTO: 19.7 % (ref 0.5–2)
SYSTOLIC BLOOD PRESSURE - MUSE: NORMAL MMHG
T AXIS - MUSE: 29 DEGREES
TROPONIN T SERPL HS-MCNC: <6 NG/L
VENTRICULAR RATE- MUSE: 71 BPM
WBC # BLD AUTO: 9.2 10E3/UL (ref 4–11)

## 2024-06-06 PROCEDURE — 36591 DRAW BLOOD OFF VENOUS DEVICE: CPT

## 2024-06-06 PROCEDURE — 250N000011 HC RX IP 250 OP 636: Mod: JZ | Performed by: EMERGENCY MEDICINE

## 2024-06-06 PROCEDURE — 99285 EMERGENCY DEPT VISIT HI MDM: CPT | Mod: 25 | Performed by: EMERGENCY MEDICINE

## 2024-06-06 PROCEDURE — 96374 THER/PROPH/DIAG INJ IV PUSH: CPT | Performed by: EMERGENCY MEDICINE

## 2024-06-06 PROCEDURE — 93010 ELECTROCARDIOGRAM REPORT: CPT | Performed by: EMERGENCY MEDICINE

## 2024-06-06 PROCEDURE — 250N000011 HC RX IP 250 OP 636: Mod: JZ | Performed by: REGISTERED NURSE

## 2024-06-06 PROCEDURE — 93005 ELECTROCARDIOGRAM TRACING: CPT | Performed by: EMERGENCY MEDICINE

## 2024-06-06 PROCEDURE — 85025 COMPLETE CBC W/AUTO DIFF WBC: CPT

## 2024-06-06 PROCEDURE — 71046 X-RAY EXAM CHEST 2 VIEWS: CPT

## 2024-06-06 PROCEDURE — 85045 AUTOMATED RETICULOCYTE COUNT: CPT | Performed by: EMERGENCY MEDICINE

## 2024-06-06 PROCEDURE — 96372 THER/PROPH/DIAG INJ SC/IM: CPT | Mod: XS | Performed by: EMERGENCY MEDICINE

## 2024-06-06 PROCEDURE — 36415 COLL VENOUS BLD VENIPUNCTURE: CPT | Performed by: EMERGENCY MEDICINE

## 2024-06-06 PROCEDURE — 84484 ASSAY OF TROPONIN QUANT: CPT | Performed by: EMERGENCY MEDICINE

## 2024-06-06 PROCEDURE — 96375 TX/PRO/DX INJ NEW DRUG ADDON: CPT | Performed by: EMERGENCY MEDICINE

## 2024-06-06 PROCEDURE — 96376 TX/PRO/DX INJ SAME DRUG ADON: CPT | Performed by: EMERGENCY MEDICINE

## 2024-06-06 RX ORDER — ONDANSETRON 2 MG/ML
8 INJECTION INTRAMUSCULAR; INTRAVENOUS ONCE
Status: COMPLETED | OUTPATIENT
Start: 2024-06-06 | End: 2024-06-06

## 2024-06-06 RX ORDER — HEPARIN SODIUM (PORCINE) LOCK FLUSH IV SOLN 100 UNIT/ML 100 UNIT/ML
5 SOLUTION INTRAVENOUS ONCE
Status: COMPLETED | OUTPATIENT
Start: 2024-06-06 | End: 2024-06-06

## 2024-06-06 RX ORDER — KETOROLAC TROMETHAMINE 30 MG/ML
30 INJECTION, SOLUTION INTRAMUSCULAR; INTRAVENOUS ONCE
Status: COMPLETED | OUTPATIENT
Start: 2024-06-06 | End: 2024-06-06

## 2024-06-06 RX ORDER — HEPARIN SODIUM,PORCINE 10 UNIT/ML
3-6 VIAL (ML) INTRAVENOUS
Status: DISCONTINUED | OUTPATIENT
Start: 2024-06-06 | End: 2024-06-06 | Stop reason: HOSPADM

## 2024-06-06 RX ADMIN — HYDROMORPHONE HYDROCHLORIDE 1 MG: 1 INJECTION, SOLUTION INTRAMUSCULAR; INTRAVENOUS; SUBCUTANEOUS at 13:36

## 2024-06-06 RX ADMIN — Medication 5 ML: at 10:09

## 2024-06-06 RX ADMIN — HYDROMORPHONE HYDROCHLORIDE 1 MG: 1 INJECTION, SOLUTION INTRAMUSCULAR; INTRAVENOUS; SUBCUTANEOUS at 14:37

## 2024-06-06 RX ADMIN — HEPARIN, PORCINE (PF) 10 UNIT/ML INTRAVENOUS SYRINGE 5 ML: at 15:38

## 2024-06-06 RX ADMIN — HYDROMORPHONE HYDROCHLORIDE 1 MG: 1 INJECTION, SOLUTION INTRAMUSCULAR; INTRAVENOUS; SUBCUTANEOUS at 12:25

## 2024-06-06 RX ADMIN — KETOROLAC TROMETHAMINE 30 MG: 30 INJECTION, SOLUTION INTRAMUSCULAR at 12:20

## 2024-06-06 RX ADMIN — ONDANSETRON 8 MG: 2 INJECTION INTRAMUSCULAR; INTRAVENOUS at 12:23

## 2024-06-06 ASSESSMENT — ACTIVITIES OF DAILY LIVING (ADL)
ADLS_ACUITY_SCORE: 37
ADLS_ACUITY_SCORE: 37
ADLS_ACUITY_SCORE: 35
ADLS_ACUITY_SCORE: 37

## 2024-06-06 NOTE — ED NOTES
Bed: ED18  Expected date:   Expected time:   Means of arrival:   Comments:  Triage- T.D. --- come get when room is ready

## 2024-06-06 NOTE — NURSING NOTE
Chief Complaint   Patient presents with    Blood Draw     Port blood draw with heparin flush by lab RN       Port accessed with blood draw and heparin flush by lab RN.    Patricia Brower RN

## 2024-06-06 NOTE — DISCHARGE INSTRUCTIONS
Please return to the emergency department for new or worsening symptoms particularly chest pain, difficulty breathing, fever cough or URI symptoms.    We did not complete a VQ scan today so we cannot confirm your pain is not caused from blood clot.  Please return for shortness of breath or worsening symptoms or if you change your mind and would prefer to get scanned.

## 2024-06-06 NOTE — ED TRIAGE NOTES
Pt is having right sided pain in rib area, lower back pain both that are similar in presentation when having a sickle cell crisis.     Triage Assessment (Adult)       Row Name 06/06/24 1103          Triage Assessment    Airway WDL WDL        Respiratory WDL    Respiratory WDL WDL        Skin Circulation/Temperature WDL    Skin Circulation/Temperature WDL WDL        Cardiac WDL    Cardiac WDL WDL        Peripheral/Neurovascular WDL    Peripheral Neurovascular WDL WDL        Cognitive/Neuro/Behavioral WDL    Cognitive/Neuro/Behavioral WDL WDL

## 2024-06-06 NOTE — ED PROVIDER NOTES
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 Patient states two days ago she had an appointment for her normal IVF/pain medication infusions when she began experiencing right sided rib pain. She states the infusion helped this pain however, that night the pain became progressively worse and she has been unable to relieve this pain since then. She has been using heat pads, hot showers and her at-home pain regimen with no relief of pain. She states yesterday she also began to feel more fatigued and tired. She was referred to have labs drawn for evaluation of anemia and possible blood transfusion. She had labs drawn this morning and states she decided to present to ED for evaluation of this pain.  She states her rib pain is not usually typical of her sickle cell pain but has experienced it in the past however, she does not think this pain is concerning of a blood clot. She is not on blood thinners. She was last on blood thinners 2 years ago. She denies recent cough, SOB or fever. No leg swelling, calf pain.  She was recently hospitalized no other prolonged immobilization.  EMERGENCY CARE PLAN   Sickle Cell Disease History  Primary Hematologist Team: Jose Rafael Duncan  PCP: none  Genotype: SS  Acute Pain Crisis Treatment: (limiting IV dosing)  ER   Dilaudid 1 mg IV q1h up to 3 doses  Toradol 30 mg IV x1   Maintenance IV fluids with LR  Other: Zofran 8 mg IV PRN nausea  Inpatient:  PCA Dilaudid 1 hr q30  minutes, no basal rate  Toradol 30 mg x6h x 4 hr  LR maintenance x 1-2 days  Other Medications: Zofran  ASA  Supportive Care: Docusate, Senna  Chronic Pain Medications:                          Home regimen: oxycodone 15 mg p.o. q.4-6 hours p.r.n. breakthrough pain.  She also continues on Voltaren gel, and Zoloft among other medications.                          -Also benefits from mental health visits, acupuncture  Baseline Hemoglobin: 7 g/dl without chronic transfusions  Hydroxyurea use: Yes  H/O blood transfusions: Yes, several (iron overload) Most recent 11/20/2021  H/O Transfusion Reactions: no  Antibodies:none  H/O Acute Chest Syndrome: Yes  Last episode:9/05/22 (previously 4/26/21, 10/2019)          ICU/intubation: not with 9/2022 admission  H/O Stroke: Yes (managed with chronic transfusions in the past, stopped late Spring 2020)  H/O VTE: Yes (2/2021)  H/O Cholecystectomy or Splenectomy: no  H/O Asthma, Pulm HTN, AVN, Leg Ulcers, Nephropathy, Retinopathy, etc: Iron overload, asthma, chronic lung disease, physical limitations from early stroke    Past Medical History  Past Medical History:   Diagnosis Date    Anxiety     Bleeding disorder (H24)     Blood clotting disorder (H24)     Cerebral infarction (H) 2015    Cognitive developmental delay     low IQ. 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 side (H)     right hand contractures    Iron overload due to repeated red blood cell transfusions     Migraines     Multiple subsegmental pulmonary emboli without acute cor pulmonale (H) 02/01/2021    Oppositional defiant behavior     Presence of intrauterine contraceptive device 2/18/2020    Superficial venous thrombosis of arm, right 03/25/2021    Uncomplicated asthma      Past Surgical History:   Procedure Laterality Date    AS INSERT TUNNELED CV 2 CATH W/O PORT/PUMP      CHOLECYSTECTOMY       CV RIGHT HEART CATH MEASUREMENTS RECORDED N/A 11/18/2021    Procedure: Right Heart Cath;  Surgeon: Jackson Stauffer MD;  Location:  HEART CARDIAC CATH LAB    INSERT PORT VASCULAR ACCESS Left 4/21/2021    Procedure: INSERTION, VASCULAR ACCESS PORT (NOT SURE ON SIDE UNTIL REMOVAL);  Surgeon: Rajan More MD;  Location: [facility] OR    IR CHEST PORT PLACEMENT > 5 YRS OF AGE  4/21/2021    IR CVC NON TUNNEL LINE REMOVAL  5/6/2021    IR CVC NON TUNNEL PLACEMENT > 5 YRS  04/07/2020    IR CVC NON TUNNEL PLACEMENT > 5 YRS  4/30/2021    IR CVC NON TUNNEL PLACEMENT > 5 YRS  9/7/2022    IR PORT REMOVAL LEFT  4/21/2021    REMOVE PORT VASCULAR ACCESS Left 4/21/2021    Procedure: REMOVAL, VASCULAR ACCESS PORT LEFT;  Surgeon: Rajan More MD;  Location: [facility] OR    REPAIR TENDON ELBOW Right 10/02/2019    Procedure: Right Elbow Flexor Lengthening, Flexor Pronator Slide Of Wrist and Finger, Thumb Adductor Lengthening;  Surgeon: Anai Franco MD;  Location: [facility] OR    TONSILLECTOMY Bilateral 10/02/2019    Procedure: Bilateral Tonsillectomy;  Surgeon: Farhana Guy MD;  Location: [facility] OR    ZZC BREAST REDUCTION (INCLUDES LIPO) TIER 3 Bilateral 04/23/2019     acetaminophen (TYLENOL) 325 MG tablet  albuterol (PROAIR HFA/PROVENTIL HFA/VENTOLIN HFA) 108 (90 Base) MCG/ACT inhaler  albuterol (PROVENTIL) (2.5 MG/3ML) 0.083% neb solution  aspirin (ASA) 81 MG chewable tablet  budesonide-formoterol (SYMBICORT) 160-4.5 MCG/ACT Inhaler  deferasirox (JADENU) 360 MG tablet  Deferiprone, Twice Daily, 1000 MG TABS  EPINEPHrine (ANY BX GENERIC EQUIV) 0.3 MG/0.3ML injection 2-pack  Hydroxyurea 1000 MG TABS  ibuprofen (ADVIL/MOTRIN) 600 MG tablet  ibuprofen (ADVIL/MOTRIN) 800 MG tablet  melatonin 5 MG tablet  methocarbamol (ROBAXIN) 750 MG tablet  naloxone (NARCAN) 4 MG/0.1ML nasal spray  naloxone (NARCAN) 4 MG/0.1ML nasal spray  omeprazole (PRILOSEC) 20 MG DR capsule  ondansetron (ZOFRAN) 8 MG tablet  oxyCODONE IR (ROXICODONE) 10 MG  tablet      Allergies   Allergen Reactions    Contrast Dye      Hives and breathing issues    Fish-Derived Products Hives    Seafood Hives    Adhesive Tape Hives     Primipore dressing causes hives    Gadolinium     Iodinated Contrast Media      Family History  Family History   Problem Relation Age of Onset    Sickle Cell Trait Mother     Hypertension Mother     Asthma Mother     Sickle Cell Trait Father     Glaucoma No family hx of     Macular Degeneration No family hx of     Diabetes No family hx of     Gout No family hx of      Social History   Social History     Tobacco Use    Smoking status: Never     Passive exposure: Never    Smokeless tobacco: Never   Substance Use Topics    Alcohol use: Not Currently     Alcohol/week: 0.0 standard drinks of alcohol    Drug use: Never      Past medical history, past surgical history, medications, allergies, family history, and social history were reviewed with the patient. No additional pertinent items.     A medically appropriate review of systems was performed with pertinent positives and negatives noted in the HPI, and all other systems negative.    Physical Exam   BP: 124/84  Pulse: 101  Temp: 98.3  F (36.8  C)  Resp: 16  Weight: 70.3 kg (155 lb)  SpO2: 99 %  Physical Exam  General: awake, alert, NAD  Head: normal cephalic  HEENT: pupils equal, conjugate gaze intact  Neck: Supple  CV: regular rate and rhythm without murmur  Lungs: clear to auscultation  EXT: lower extremities without swelling or edema  Neuro: awake, answers questions appropriately. No focal deficits noted     ED Course, Procedures, & Data      Procedures            EKG Interpretation:      Interpreted by Kilo Cruz MD  Time reviewed: 13:31:54  Symptoms at time of EKG: Sickle Cell Pain Crisis   Rhythm: normal sinus   Rate: normal  Axis: normal  Ectopy: none  Conduction: normal  ST Segments/ T Waves: No ST-T wave changes  Q Waves: none  Comparison to prior: Unchanged from 4/23    Clinical Impression:  No signs of acute ischemia         Results for orders placed or performed during the hospital encounter of 06/06/24   Chest XR,  PA & LAT     Status: None    Narrative    CHEST TWO VIEWS  6/6/2024 12:52 PM     HISTORY: 24-year-old woman with right-sided rib pain.       Impression    IMPRESSION: Since April 23, 2024, heart size remains normal. Left  internal jugular port and catheter in good position. No pleural  effusion, pneumothorax, or abnormal area of consolidation. No obvious  rib fracture.    CHIP ROMAN MD         SYSTEM ID:  [id number]   [place] Draw     Status: None    Narrative    The following orders were created for panel order [place] Draw.  Procedure                               Abnormality         Status                     ---------                               -----------         ------                     Extra Green Top (Lithium...[[id number]]                      Final result               Extra Purple Top Tube[[id number]]                            Final result                 Please view results for these tests on the individual orders.   Extra Green Top (Lithium Heparin) Tube     Status: None   Result Value Ref Range    Hold Specimen JIC    Extra Purple Top Tube     Status: None   Result Value Ref Range    Hold Specimen JIC    Reticulocyte count     Status: Abnormal   Result Value Ref Range    % Reticulocyte 19.7 (H) 0.5 - 2.0 %    Absolute Reticulocyte 0.500 (H) 0.025 - 0.095 10e6/uL   Troponin T, High Sensitivity     Status: Normal   Result Value Ref Range    Troponin T, High Sensitivity <6 <=14 ng/L   Results for orders placed or performed in visit on 06/06/24   CBC with platelets and differential     Status: Abnormal   Result Value Ref Range    WBC Count 9.2 4.0 - 11.0 10e3/uL    RBC Count 2.57 (L) 3.80 - 5.20 10e6/uL    Hemoglobin 7.5 (L) 11.7 - 15.7 g/dL    Hematocrit 22.2 (L) 35.0 - 47.0 %    MCV 86 78 - 100 fL    MCH 29.2 26.5 - 33.0 pg    MCHC 33.8 31.5 - 36.5 g/dL    RDW 21.9 (H)  10.0 - 15.0 %    Platelet Count 582 (H) 150 - 450 10e3/uL    % Neutrophils 66 %    % Lymphocytes 18 %    % Monocytes 8 %    % Eosinophils 5 %    % Basophils 3 %    % Immature Granulocytes 0 %    NRBCs per 100 WBC 3 (H) <1 /100    Absolute Neutrophils 6.1 1.6 - 8.3 10e3/uL    Absolute Lymphocytes 1.6 0.8 - 5.3 10e3/uL    Absolute Monocytes 0.8 0.0 - 1.3 10e3/uL    Absolute Eosinophils 0.4 0.0 - 0.7 10e3/uL    Absolute Basophils 0.2 0.0 - 0.2 10e3/uL    Absolute Immature Granulocytes 0.0 <=0.4 10e3/uL    Absolute NRBCs 0.2 10e3/uL   CBC with platelets differential     Status: Abnormal    Narrative    The following orders were created for panel order CBC with platelets differential.  Procedure                               Abnormality         Status                     ---------                               -----------         ------                     CBC with platelets and d...[[id number]]  Abnormal            Final result                 Please view results for these tests on the individual orders.     Medications   ketorolac (TORADOL) injection 30 mg (30 mg Intramuscular $Given 6/6/24 1220)   HYDROmorphone (DILAUDID) injection 1 mg (1 mg Intravenous $Given 6/6/24 1437)   ondansetron (ZOFRAN) injection 8 mg (8 mg Intravenous $Given 6/6/24 1223)     Labs Ordered and Resulted from Time of ED Arrival to Time of ED Departure   RETICULOCYTE COUNT - Abnormal       Result Value    % Reticulocyte 19.7 (*)     Absolute Reticulocyte 0.500 (*)    TROPONIN T, HIGH SENSITIVITY - Normal    Troponin T, High Sensitivity <6       Chest XR,  PA & LAT   Final Result   IMPRESSION: Since April 23, 2024, heart size remains normal. Left   internal jugular port and catheter in good position. No pleural   effusion, pneumothorax, or abnormal area of consolidation. No obvious   rib fracture.      CHIP ROMAN MD            SYSTEM ID:  [id number]      NM Lung Scan Ventilation and Perfusion    (Results Pending)          Critical care was not  performed.     Medical Decision Making  The patient's presentation was of high complexity (a chronic illness severe exacerbation, progression, or side effect of treatment).    The patient's evaluation involved:  review of external note(s) from 3+ sources (see separate area of note for details)  review of 3+ test result(s) ordered prior to this encounter (see separate area of note for details)  ordering and/or review of 3+ test(s) in this encounter (see separate area of note for details)  independent interpretation of testing performed by another health professional (chest x-ray reviewed by me no acute finding)    The patient's management necessitated high risk (a parenteral controlled substance) and high risk (a decision regarding hospitalization).    Assessment & Plan    Debby Cervantes is a 24-year-old female who presents to the emergency department with right-sided rib pain.  She reports this is secondary to sickle cell pain crisis for which she has had in the past but this is not a typical presentation.  She is tachycardic though satting 99% on room air and is denying cough URI symptoms or shortness of breath which is reassuring.    Differential would include chronic pain, could also consider other more dangerous sources of chest pain including PE, acute chest syndrome, pneumonia infection or ACS.    EKG shows no signs of acute ischemia, troponin is normal.  I ordered her reticulocyte count which is appropriately elevated so do not worry about bone marrow failure at this time.  I reviewed labs that were done earlier today.  Her hemoglobin is near baseline.  White count is normal.    Patient got pain medication per her care plan on reassessment reported feeling much better.    Chest x-ray without acute findings.  Patient does have a history of PE she is not anticoagulated.  She is tachycardic I do feel that we need to rule her out for PE.  She is allergic to IV contrast will order V/Q study.  This was pending at the  time of signout was ordered signed out to the oncoming evening physician will follow-up on V/Q results and dispo.    Addendum: Patient asked to discharge without V/Q study performed.  I went and discussed this with the patient she is aware that this is the only way we can assess for blood clot.  Patient reports that she does not believe her symptoms are secondary from a blood clot, she reports this feels very typical of her sickle pain crises and does not want an additional study she understands that blood clots could be life-threatening and that we have not been able to rule that out with today's ER evaluation but she is requesting discharge without V/Q study.  She will return for new or worsening symptoms.    I have reviewed the nursing notes. I have reviewed the findings, diagnosis, plan and need for follow up with the patient.    New Prescriptions    No medications on file       Final diagnoses:   Right-sided chest pain   I, PILO PATTON, am serving as a trained medical scribe to document services personally performed by Kilo Dai MD, based on the provider's statements to me.      I, Kilo Dai MD, was physically present and have reviewed and verified the accuracy of this note documented by PILO PATTON.     Kilo Dai MD  [facility] EMERGENCY DEPARTMENT  6/6/2024           Kilo Dai MD  06/06/24 1450       Kilo Dai MD  06/06/24 [id number]

## 2024-06-07 NOTE — PLAN OF CARE
"[id number]    Pt A&Ox4 reports generalized pain being controlled with PCA pump, oral oxycodone and tylenol. Capno in place this shift. Port accessed for PCA and running NS TKO. Voiding regularly. VSS on RA    /64 (BP Location: Right arm)   Pulse 100   Temp 96.9  F (36.1  C) (Oral)   Resp 18   Ht 1.626 m (5' 4\")   Wt 69.6 kg (153 lb 8 oz)   LMP  (LMP Unknown)   SpO2 92%   BMI 26.35 kg/m        "  in summary the patient is a 78-year-old man with both end-stage heart failure as well as probable new metastatic lung cancer.  I have had long discussions with Dr. Jarrell and with the patient's heart failure doctor Dr. Shell.  He is not a candidate for advanced therapies at this time based on his lung cancer and his renal insufficiency.  As per Dr. Shell suggestion will transfer patient to ICU for dobutamine infusion in hopes of making patient more comfortable and possibly improving his renal function this dobutamine infusion  can be and should be looked upon as a palliative measure.  Should his renal function improve and should the patient improved symptomatically consideration to other therapies could be given potentially

## 2024-06-09 ENCOUNTER — HOSPITAL ENCOUNTER (EMERGENCY)
Facility: CLINIC | Age: 24
Discharge: HOME | End: 2024-06-09
Attending: EMERGENCY MEDICINE | Admitting: EMERGENCY MEDICINE
Payer: COMMERCIAL

## 2024-06-09 VITALS
TEMPERATURE: 97.4 F | DIASTOLIC BLOOD PRESSURE: 86 MMHG | BODY MASS INDEX: 26.61 KG/M2 | OXYGEN SATURATION: 94 % | HEART RATE: 84 BPM | SYSTOLIC BLOOD PRESSURE: 130 MMHG | RESPIRATION RATE: 16 BRPM | WEIGHT: 155 LBS

## 2024-06-09 DIAGNOSIS — D57.00 SICKLE CELL PAIN CRISIS (H): ICD-10-CM

## 2024-06-09 LAB
ACANTHOCYTES BLD QL SMEAR: ABNORMAL
ALBUMIN SERPL BCG-MCNC: 4.6 G/DL (ref 3.5–5.2)
ALP SERPL-CCNC: 63 U/L (ref 40–150)
ALT SERPL W P-5'-P-CCNC: 31 U/L (ref 0–50)
ANION GAP SERPL CALCULATED.3IONS-SCNC: 11 MMOL/L (ref 7–15)
AST SERPL W P-5'-P-CCNC: 39 U/L (ref 0–45)
AUER BODIES BLD QL SMEAR: ABNORMAL
BASO STIPL BLD QL SMEAR: ABNORMAL
BASOPHILS # BLD AUTO: 0.2 10E3/UL (ref 0–0.2)
BASOPHILS NFR BLD AUTO: 2 %
BILIRUB SERPL-MCNC: 2.4 MG/DL
BITE CELLS BLD QL SMEAR: ABNORMAL
BLISTER CELLS BLD QL SMEAR: ABNORMAL
BUN SERPL-MCNC: 7.7 MG/DL (ref 6–20)
BURR CELLS BLD QL SMEAR: ABNORMAL
CALCIUM SERPL-MCNC: 8.9 MG/DL (ref 8.6–10)
CHLORIDE SERPL-SCNC: 106 MMOL/L (ref 98–107)
CREAT SERPL-MCNC: 0.51 MG/DL (ref 0.51–0.95)
DACRYOCYTES BLD QL SMEAR: ABNORMAL
DEPRECATED HCO3 PLAS-SCNC: 22 MMOL/L (ref 22–29)
EGFRCR SERPLBLD CKD-EPI 2021: >90 ML/MIN/1.73M2
ELLIPTOCYTES BLD QL SMEAR: ABNORMAL
EOSINOPHIL # BLD AUTO: 0.4 10E3/UL (ref 0–0.7)
EOSINOPHIL NFR BLD AUTO: 4 %
ERYTHROCYTE [DISTWIDTH] IN BLOOD BY AUTOMATED COUNT: 20.7 % (ref 10–15)
FRAGMENTS BLD QL SMEAR: ABNORMAL
GLUCOSE SERPL-MCNC: 90 MG/DL (ref 70–99)
HCT VFR BLD AUTO: 22.2 % (ref 35–47)
HGB BLD-MCNC: 7.6 G/DL (ref 11.7–15.7)
HGB C CRYSTALS: ABNORMAL
HOWELL-JOLLY BOD BLD QL SMEAR: ABNORMAL
IMM GRANULOCYTES # BLD: 0 10E3/UL
IMM GRANULOCYTES NFR BLD: 0 %
LYMPHOCYTES # BLD AUTO: 2.4 10E3/UL (ref 0.8–5.3)
LYMPHOCYTES NFR BLD AUTO: 23 %
MCH RBC QN AUTO: 29.8 PG (ref 26.5–33)
MCHC RBC AUTO-ENTMCNC: 34.2 G/DL (ref 31.5–36.5)
MCV RBC AUTO: 87 FL (ref 78–100)
MONOCYTES # BLD AUTO: 0.8 10E3/UL (ref 0–1.3)
MONOCYTES NFR BLD AUTO: 8 %
NEUTROPHILS # BLD AUTO: 6.5 10E3/UL (ref 1.6–8.3)
NEUTROPHILS NFR BLD AUTO: 63 %
NEUTS HYPERSEG BLD QL SMEAR: ABNORMAL
NRBC # BLD AUTO: 0.1 10E3/UL
NRBC BLD AUTO-RTO: 1 /100
PLAT MORPH BLD: ABNORMAL
PLATELET # BLD AUTO: 566 10E3/UL (ref 150–450)
POLYCHROMASIA BLD QL SMEAR: ABNORMAL
POTASSIUM SERPL-SCNC: 3.9 MMOL/L (ref 3.4–5.3)
PROT SERPL-MCNC: 7.4 G/DL (ref 6.4–8.3)
RBC # BLD AUTO: 2.55 10E6/UL (ref 3.8–5.2)
RBC AGGLUT BLD QL: ABNORMAL
RBC MORPH BLD: ABNORMAL
RETICS # AUTO: 0.42 10E6/UL (ref 0.03–0.1)
RETICS/RBC NFR AUTO: 16.6 % (ref 0.5–2)
ROULEAUX BLD QL SMEAR: ABNORMAL
SICKLE CELLS BLD QL SMEAR: ABNORMAL
SMUDGE CELLS BLD QL SMEAR: ABNORMAL
SODIUM SERPL-SCNC: 139 MMOL/L (ref 135–145)
SPHEROCYTES BLD QL SMEAR: ABNORMAL
STOMATOCYTES BLD QL SMEAR: ABNORMAL
TARGETS BLD QL SMEAR: SLIGHT
TOXIC GRANULES BLD QL SMEAR: ABNORMAL
VARIANT LYMPHS BLD QL SMEAR: ABNORMAL
WBC # BLD AUTO: 10.3 10E3/UL (ref 4–11)

## 2024-06-09 PROCEDURE — 85025 COMPLETE CBC W/AUTO DIFF WBC: CPT | Performed by: EMERGENCY MEDICINE

## 2024-06-09 PROCEDURE — 250N000011 HC RX IP 250 OP 636: Mod: JZ | Performed by: EMERGENCY MEDICINE

## 2024-06-09 PROCEDURE — 96376 TX/PRO/DX INJ SAME DRUG ADON: CPT | Performed by: EMERGENCY MEDICINE

## 2024-06-09 PROCEDURE — 36415 COLL VENOUS BLD VENIPUNCTURE: CPT | Performed by: EMERGENCY MEDICINE

## 2024-06-09 PROCEDURE — 258N000003 HC RX IP 258 OP 636: Mod: JZ | Performed by: EMERGENCY MEDICINE

## 2024-06-09 PROCEDURE — 96361 HYDRATE IV INFUSION ADD-ON: CPT | Performed by: EMERGENCY MEDICINE

## 2024-06-09 PROCEDURE — 99285 EMERGENCY DEPT VISIT HI MDM: CPT | Performed by: EMERGENCY MEDICINE

## 2024-06-09 PROCEDURE — 80053 COMPREHEN METABOLIC PANEL: CPT | Performed by: EMERGENCY MEDICINE

## 2024-06-09 PROCEDURE — 85045 AUTOMATED RETICULOCYTE COUNT: CPT | Performed by: EMERGENCY MEDICINE

## 2024-06-09 PROCEDURE — 99284 EMERGENCY DEPT VISIT MOD MDM: CPT | Mod: 25 | Performed by: EMERGENCY MEDICINE

## 2024-06-09 PROCEDURE — 96374 THER/PROPH/DIAG INJ IV PUSH: CPT | Performed by: EMERGENCY MEDICINE

## 2024-06-09 PROCEDURE — 96375 TX/PRO/DX INJ NEW DRUG ADDON: CPT | Performed by: EMERGENCY MEDICINE

## 2024-06-09 RX ORDER — HEPARIN SODIUM 1000 [USP'U]/ML
5 INJECTION, SOLUTION INTRAVENOUS; SUBCUTANEOUS ONCE
Status: DISCONTINUED | OUTPATIENT
Start: 2024-06-09 | End: 2024-06-09 | Stop reason: DRUGHIGH

## 2024-06-09 RX ORDER — HEPARIN SODIUM (PORCINE) LOCK FLUSH IV SOLN 100 UNIT/ML 100 UNIT/ML
100 SOLUTION INTRAVENOUS ONCE
Status: COMPLETED | OUTPATIENT
Start: 2024-06-09 | End: 2024-06-09

## 2024-06-09 RX ORDER — ONDANSETRON 2 MG/ML
8 INJECTION INTRAMUSCULAR; INTRAVENOUS
Status: DISCONTINUED | OUTPATIENT
Start: 2024-06-09 | End: 2024-06-09 | Stop reason: HOSPADM

## 2024-06-09 RX ORDER — KETOROLAC TROMETHAMINE 30 MG/ML
30 INJECTION, SOLUTION INTRAMUSCULAR; INTRAVENOUS ONCE
Status: COMPLETED | OUTPATIENT
Start: 2024-06-09 | End: 2024-06-09

## 2024-06-09 RX ADMIN — SODIUM CHLORIDE, POTASSIUM CHLORIDE, SODIUM LACTATE AND CALCIUM CHLORIDE 1000 ML: 600; 310; 30; 20 INJECTION, SOLUTION INTRAVENOUS at 01:32

## 2024-06-09 RX ADMIN — HYDROMORPHONE HYDROCHLORIDE 1 MG: 1 INJECTION, SOLUTION INTRAMUSCULAR; INTRAVENOUS; SUBCUTANEOUS at 01:50

## 2024-06-09 RX ADMIN — KETOROLAC TROMETHAMINE 30 MG: 30 INJECTION, SOLUTION INTRAMUSCULAR at 01:27

## 2024-06-09 RX ADMIN — HYDROMORPHONE HYDROCHLORIDE 1 MG: 1 INJECTION, SOLUTION INTRAMUSCULAR; INTRAVENOUS; SUBCUTANEOUS at 04:19

## 2024-06-09 RX ADMIN — HEPARIN 100 UNITS: 100 SYRINGE at 05:26

## 2024-06-09 RX ADMIN — HYDROMORPHONE HYDROCHLORIDE 1 MG: 1 INJECTION, SOLUTION INTRAMUSCULAR; INTRAVENOUS; SUBCUTANEOUS at 03:08

## 2024-06-09 ASSESSMENT — ACTIVITIES OF DAILY LIVING (ADL)
ADLS_ACUITY_SCORE: 37
ADLS_ACUITY_SCORE: 35
ADLS_ACUITY_SCORE: 37

## 2024-06-09 NOTE — ED TRIAGE NOTES
Patient has a hx of sickle cell pain. Patient states her pain in her right arm started about 2 hours ago. Pt said it started to continue to get worse, and did take some pain medications but it did not help at all. Patient is worried as this is her dominant side as she had a stroke on her left side in the past.

## 2024-06-09 NOTE — ED PROVIDER NOTES
History     Chief Complaint   Patient presents with    Sickle Cell Pain Crisis     Patient has a hx of sickle cell pain. Patient states her pain in her left arm started about 2 hours ago. Pt said it started to continue to get worse, and did take some pain medications but it did not help at all. Patient is worried as this is her dominant side as she had a stroke on her right side in the past.      HPI  Jennifer Cervantes is a 24 year old female with PMH notable for sickle cell anemia with frequent pain crises  who presents to the ED with arm pain.  Patient reports pain located on the left arm.  No trauma.  Pain is similar to past sickle cell pain flares.  No pain in the chest or shortness of breath.  No recent fevers.  She has tried ibuprofen and her home prescribed oxycodone without sufficient improvement.     Physical Exam   BP: 126/88  Pulse: 76  Temp: 98  F (36.7  C)  Resp: 16  Weight: 70.3 kg (155 lb)  SpO2: 97 %    Physical Exam  General: Appears somewhat uncomfortable. Appears stated age.   HENT: MMM, no oropharyngeal lesions  Eyes: PERRL, normal sclerae   Cardio: Regular rate. Regular rhythm. Extremities well perfused with 2+ radial pulses  Resp: Normal work of breathing, Normal respiratory rate.   Neuro: alert without signs of confusion. CN II-XII grossly intact. Grossly normal strength and sensation in all extremities.   Psych: normal affect, normal behavior  MSK: Left arm with soft compartments, mildly tender, not overly warm, no significant pain with shoulder/elbow/wrist ROM.    ED Course      Procedures              Labs Ordered and Resulted from Time of ED Arrival to Time of ED Departure   COMPREHENSIVE METABOLIC PANEL - Abnormal       Result Value    Sodium 139      Potassium 3.9      Carbon Dioxide (CO2) 22      Anion Gap 11      Urea Nitrogen 7.7      Creatinine 0.51      GFR Estimate >90      Calcium 8.9      Chloride 106      Glucose 90      Alkaline Phosphatase 63      AST 39      ALT 31      Protein  Total 7.4      Albumin 4.6      Bilirubin Total 2.4 (*)    RETICULOCYTE COUNT - Abnormal    % Reticulocyte 16.6 (*)     Absolute Reticulocyte 0.424 (*)    CBC WITH PLATELETS AND DIFFERENTIAL - Abnormal    WBC Count 10.3      RBC Count 2.55 (*)     Hemoglobin 7.6 (*)     Hematocrit 22.2 (*)     MCV 87      MCH 29.8      MCHC 34.2      RDW 20.7 (*)     Platelet Count 566 (*)     % Neutrophils 63      % Lymphocytes 23      % Monocytes 8      % Eosinophils 4      % Basophils 2      % Immature Granulocytes 0      NRBCs per 100 WBC 1 (*)     Absolute Neutrophils 6.5      Absolute Lymphocytes 2.4      Absolute Monocytes 0.8      Absolute Eosinophils 0.4      Absolute Basophils 0.2      Absolute Immature Granulocytes 0.0      Absolute NRBCs 0.1     RBC AND PLATELET MORPHOLOGY - Abnormal    Platelet Assessment        Acanthocytes        Larry Rods        Basophilic Stippling        Bite Cells        Blister Cells        Juvencio Cells        Elliptocytes        Hgb C Crystals        White-Jolly Bodies        Hypersegmented Neutrophils        Polychromasia        RBC agglutination        RBC Fragments        Reactive Lymphocytes        Rouleaux        Sickle Cells Moderate (*)     Smudge Cells        Spherocytes        Stomatocytes        Target Cells Slight (*)     Teardrop Cells        Toxic Neutrophils        RBC Morphology Confirmed RBC Indices       No orders to display            Medical Decision Making  The patient's presentation was of high complexity (a chronic illness severe exacerbation, progression, or side effect of treatment).    The patient's evaluation involved:  review of external note(s) from 1 sources (care coordination note)  ordering and/or review of 3+ test(s) in this encounter (see separate area of note for details)    The patient's management necessitated high risk (a parenteral controlled substance).      Assessments & Plan   Patient presenting with left arm pain with quality similar to past sickle cell  pain flares. Vitals in the ED unremarkable. Nursing notes reviewed.  Exam of the arm was unremarkable.  No arm swelling to suggest DVT.  No evidence of infection.  Hemoglobin at baseline.  Reticulocyte count appropriately elevated.  Chemistry panel unremarkable.    Patient with ED care plan in place, which was reviewed.  Pain medications ordered in line with patient's ED care plan.  In the ED, the patient's symptoms were managed with hydromorphone and ketorolac, with improvement in symptoms upon reassessment.  Patient then felt pain was adequately controlled for outpatient management.    The complete clinical picture is most consistent with sickle cell pain flare. After counseling on the diagnosis, work-up, and treatment plan, the patient was discharged to home. The patient was advised to follow-up with her hematologist within a few days. The patient was advised to return to the ED if worsening symptoms, or any urgent health concerns.     Final diagnoses:   Sickle cell pain crisis (H)     Discharge Medication List as of 6/9/2024  4:39 AM        --  Huang Domingo MD   Emergency Medicine   [facility] EMERGENCY DEPARTMENT  6/9/2024       Huang Domingo MD  06/09/24 0616

## 2024-06-09 NOTE — DISCHARGE INSTRUCTIONS
Instructions from your doctor today:  Emergency Department (ED) testing is focused on the potential causes of your symptoms that are the most dangerous possibilities, and cannot cover every possibility. Based on the evaluation, it was deemed sufficiently safe to discharge and continue management through the clinics. Thus, follow-up is very important to assess for improvement/worsening, potential further testing, and potential treatment adjustments. If you were given opioid pain medications or other medications that can make you drowsy while in the ED, you should not drive for at least several hours and not until you feel completely back to normal.     Please make an appointment to follow up with:  - your hematologist in 2-4 days  - If you do not have a primary care provider, you can be seen in follow-up and establish care by calling any of the clinics below:     - [facility] (phone: [phone])     - Primary [facility] / [facility] (phone: [phone])   - Have your clinic provider review the results from today's visit with you again, including any potential follow-up or additional testing that may be needed based on the results. Occasionally, incidental findings are found on later review by radiologists that may need follow-up.     Return to the Emergency Department immediately if you have worsening symptoms, or any other urgent health concerns.

## 2024-06-10 ENCOUNTER — INFUSION THERAPY VISIT (OUTPATIENT)
Dept: INFUSION THERAPY | Facility: CLINIC | Age: 24
End: 2024-06-10
Attending: PEDIATRICS
Payer: COMMERCIAL

## 2024-06-10 ENCOUNTER — PATIENT OUTREACH (OUTPATIENT)
Dept: CARE COORDINATION | Facility: CLINIC | Age: 24
End: 2024-06-10
Payer: COMMERCIAL

## 2024-06-10 ENCOUNTER — NURSE TRIAGE (OUTPATIENT)
Dept: ONCOLOGY | Facility: CLINIC | Age: 24
End: 2024-06-10
Payer: COMMERCIAL

## 2024-06-10 VITALS
TEMPERATURE: 98.2 F | OXYGEN SATURATION: 98 % | RESPIRATION RATE: 18 BRPM | SYSTOLIC BLOOD PRESSURE: 120 MMHG | DIASTOLIC BLOOD PRESSURE: 72 MMHG | HEART RATE: 82 BPM

## 2024-06-10 DIAGNOSIS — G81.10 SPASTIC HEMIPLEGIA, UNSPECIFIED ETIOLOGY, UNSPECIFIED LATERALITY (H): ICD-10-CM

## 2024-06-10 DIAGNOSIS — D57.00 SICKLE CELL PAIN CRISIS (H): Primary | ICD-10-CM

## 2024-06-10 DIAGNOSIS — D57.00 SICKLE CELL PAIN CRISIS (H): ICD-10-CM

## 2024-06-10 PROCEDURE — 96376 TX/PRO/DX INJ SAME DRUG ADON: CPT

## 2024-06-10 PROCEDURE — 96374 THER/PROPH/DIAG INJ IV PUSH: CPT

## 2024-06-10 PROCEDURE — 258N000003 HC RX IP 258 OP 636: Mod: JZ | Performed by: PEDIATRICS

## 2024-06-10 PROCEDURE — 250N000011 HC RX IP 250 OP 636: Mod: JZ | Performed by: PEDIATRICS

## 2024-06-10 PROCEDURE — 96361 HYDRATE IV INFUSION ADD-ON: CPT

## 2024-06-10 PROCEDURE — 250N000013 HC RX MED GY IP 250 OP 250 PS 637: Performed by: PEDIATRICS

## 2024-06-10 PROCEDURE — 96375 TX/PRO/DX INJ NEW DRUG ADDON: CPT

## 2024-06-10 RX ORDER — HEPARIN SODIUM (PORCINE) LOCK FLUSH IV SOLN 100 UNIT/ML 100 UNIT/ML
5 SOLUTION INTRAVENOUS
Status: CANCELLED | OUTPATIENT
Start: 2024-07-01

## 2024-06-10 RX ORDER — DIPHENHYDRAMINE HCL 25 MG
50 CAPSULE ORAL
Status: COMPLETED | OUTPATIENT
Start: 2024-06-10 | End: 2024-06-10

## 2024-06-10 RX ORDER — KETOROLAC TROMETHAMINE 30 MG/ML
30 INJECTION, SOLUTION INTRAMUSCULAR; INTRAVENOUS ONCE
Status: CANCELLED
Start: 2024-07-01 | End: 2024-07-01

## 2024-06-10 RX ORDER — ONDANSETRON 2 MG/ML
8 INJECTION INTRAMUSCULAR; INTRAVENOUS EVERY 6 HOURS PRN
Status: CANCELLED
Start: 2024-07-01

## 2024-06-10 RX ORDER — KETOROLAC TROMETHAMINE 30 MG/ML
30 INJECTION, SOLUTION INTRAMUSCULAR; INTRAVENOUS ONCE
Status: COMPLETED | OUTPATIENT
Start: 2024-06-10 | End: 2024-06-10

## 2024-06-10 RX ORDER — OXYCODONE HYDROCHLORIDE 10 MG/1
10 TABLET ORAL EVERY 4 HOURS PRN
Qty: 20 TABLET | Refills: 0 | Status: SHIPPED | OUTPATIENT
Start: 2024-06-10 | End: 2024-06-14

## 2024-06-10 RX ORDER — HEPARIN SODIUM (PORCINE) LOCK FLUSH IV SOLN 100 UNIT/ML 100 UNIT/ML
5 SOLUTION INTRAVENOUS
Status: DISCONTINUED | OUTPATIENT
Start: 2024-06-10 | End: 2024-06-10 | Stop reason: HOSPADM

## 2024-06-10 RX ORDER — HEPARIN SODIUM,PORCINE 10 UNIT/ML
5 VIAL (ML) INTRAVENOUS
Status: CANCELLED | OUTPATIENT
Start: 2024-07-01

## 2024-06-10 RX ORDER — ONDANSETRON 4 MG/1
8 TABLET, FILM COATED ORAL
Status: CANCELLED
Start: 2024-07-01

## 2024-06-10 RX ORDER — ONDANSETRON 2 MG/ML
8 INJECTION INTRAMUSCULAR; INTRAVENOUS EVERY 6 HOURS PRN
Status: DISCONTINUED | OUTPATIENT
Start: 2024-06-10 | End: 2024-06-10 | Stop reason: HOSPADM

## 2024-06-10 RX ORDER — DIPHENHYDRAMINE HCL 25 MG
50 CAPSULE ORAL
Status: CANCELLED
Start: 2024-07-01

## 2024-06-10 RX ADMIN — SODIUM CHLORIDE, POTASSIUM CHLORIDE, SODIUM LACTATE AND CALCIUM CHLORIDE 1000 ML: 600; 310; 30; 20 INJECTION, SOLUTION INTRAVENOUS at 09:26

## 2024-06-10 RX ADMIN — Medication 5 ML: at 11:45

## 2024-06-10 RX ADMIN — HYDROMORPHONE HYDROCHLORIDE 1 MG: 1 INJECTION, SOLUTION INTRAMUSCULAR; INTRAVENOUS; SUBCUTANEOUS at 10:45

## 2024-06-10 RX ADMIN — KETOROLAC TROMETHAMINE 30 MG: 30 INJECTION, SOLUTION INTRAMUSCULAR; INTRAVENOUS at 09:34

## 2024-06-10 RX ADMIN — HYDROMORPHONE HYDROCHLORIDE 1 MG: 1 INJECTION, SOLUTION INTRAMUSCULAR; INTRAVENOUS; SUBCUTANEOUS at 09:38

## 2024-06-10 RX ADMIN — HYDROMORPHONE HYDROCHLORIDE 1 MG: 1 INJECTION, SOLUTION INTRAMUSCULAR; INTRAVENOUS; SUBCUTANEOUS at 11:42

## 2024-06-10 RX ADMIN — DIPHENHYDRAMINE HYDROCHLORIDE 50 MG: 25 CAPSULE ORAL at 09:41

## 2024-06-10 RX ADMIN — ONDANSETRON 8 MG: 2 INJECTION INTRAMUSCULAR; INTRAVENOUS at 09:28

## 2024-06-10 ASSESSMENT — PAIN SCALES - GENERAL: PAINLEVEL: SEVERE PAIN (7)

## 2024-06-10 NOTE — TELEPHONE ENCOUNTER
Narcotic Refill Request    Medication(s) requested:  Oxycodone  Person Requesting Refill:Jennifer (pt)  What pain is the medication treating: sickle cell pain  How is the medication being taken?:1 tablet every 4hours as needed  Does pt have enough for today? no  Is pain being adequately controlled on the current regimen?: okay, requires IVF/Pain a couple times a week  Experiencing any side effects from medication?: denies    Date of most recent appointment:  5/24/2024 Patricia Mantilla CNP  Any No Show Visits:none recently  Next appointment:   pending to be scheduled with next provider appt  Last fill date and by whom:  Patricia Mantilla 6/3/2024.    Reviewed: YES      Send to provider: Patricia Mantilla CNP and RNCC

## 2024-06-10 NOTE — PATIENT INSTRUCTIONS
Dear Jennifer Cervantes    Thank you for choosing [facility] ([facility]) for your infusion.  The following information is a summary of our appointment as well as important reminders.      We look forward in seeing you on your next appointment here at [facility] ([facility]).  Please don t hesitate to call us at [phone] to reschedule any of your appointments or to speak with one of the [facility] registered nurses.  It was a pleasure taking care of you today.    Sincerely,    [facility]  [facility]  [address] [place] [address]  [id number]  Phone:  [phone]

## 2024-06-10 NOTE — TELEPHONE ENCOUNTER
Oncology Nurse Triage - Sickle Cell Pain Crisis:    Situation: Jennifer  calling about Sickle Cell Pain Crisis, requesting to be added on for IV fluids and pain medicine    Background:     Patient's last infusion was Sat 6/8 was in ED  Last clinic visit date:5/24/2024 Farhan Mantilla CNP  Does patient have active treatment plan?  Yes      Assessment of Symptoms:  Onset/Duration of symptoms: 2 day    Is it typical sickle cell pain? Yes   Location: Arms, lower back  Character: Sharp           Intensity: 7/10    Any radiation of pain, numbness, tingling, weakness, warmth, swelling, discoloration of arms or legs?No     Fever?No    Chest Pain Present: No     Shortness of breath: No     Other home therapies tried: HEAT/HEATING PAD and WARM BATH     Last home medication taken and when:  Oxycodone taken around 6:00am    Any Refills Needed?: Yes , Oxycodone send to [address] [place] [address] [facility]    Does patient have transportation & length of time to get to clinic: Yes       Call Jennifer at [phone]    Recommendations:   0705 Paged Farhan Mantilla CNP    0805 Approved by farhan Mantilla for iVF/pain    0835 Offer for infusion appt today at  [facility] 9:00am, Jennifer in agreement and able to get to clinic by 9:00am.     0840 Scheduling request sent to add to [facility]      Please note, if you are late for your appt, you risk losing your infusion appt as it may delay another patient's infusion who arrived on time.

## 2024-06-10 NOTE — PROGRESS NOTES
Infusion Nursing Note:  Jennifer Cervantes presents today for IVF, analgesics and antiemetics.    Patient seen by provider today: No   present during visit today: Not Applicable.    Note:   1L LR infused over 2 hours.  Zofran given IVP  Toradol given IVP  PO benadryl given    Intravenous Access:  Implanted Port.    Treatment Conditions:  Not Applicable.    Administrations This Visit       diphenhydrAMINE (BENADRYL) capsule 50 mg       Admin Date  06/10/2024 Action  $Given Dose  50 mg Route  Oral Documented By  Eulalia Rashid RN              heparin lock flush 100 unit/mL injection 5 mL       Admin Date  06/10/2024 Action  $Given Dose  5 mL Route  Intracatheter Documented By  Eulalia Rashid RN              HYDROmorphone (DILAUDID) injection 1 mg       Admin Date  06/10/2024 Action  $Given Dose  1 mg Route  Intravenous Documented By  Eulalia Rashid RN               Admin Date  06/10/2024 Action  $Given Dose  1 mg Route  Intravenous Documented By  Eulalia Rashid RN               Admin Date  06/10/2024 Action  $Given Dose  1 mg Route  Intravenous Documented By  Eulalia Rashid RN              ketorolac (TORADOL) injection 30 mg       Admin Date  06/10/2024 Action  $Given Dose  30 mg Route  Intravenous Documented By  Eulalia Rashid RN              lactated ringers BOLUS 1,000 mL       Admin Date  06/10/2024 Action  $New Bag Dose  1,000 mL Rate  500 mL/hr Route  Intravenous Documented By  Eulalia Rashid RN              ondansetron (ZOFRAN) injection 8 mg       Admin Date  06/10/2024 Action  $Given Dose  8 mg Route  Intravenous Documented By  Eulalia Rashid RN                  /72   Pulse 82   Temp 98.2  F (36.8  C)   Resp 18   LMP  (LMP Unknown)   SpO2 98%     Post Infusion Assessment:  Patient tolerated infusion without incident.  Blood return noted pre and post infusion.  Site patent and intact, free from redness, edema or discomfort.  No evidence of extravasations.  Access discontinued  per protocol.   Post-infusion pain = 3/10    Discharge Plan:   Discharge instructions reviewed with: Patient.  Patient and/or family verbalized understanding of discharge instructions and all questions answered.  AVS to patient via [employer].  Patient will return PRN for next appointment.   Patient discharged in stable condition accompanied by: self.  Departure Mode: Ambulatory.      Eulalia Rashid RN

## 2024-06-12 ENCOUNTER — NURSE TRIAGE (OUTPATIENT)
Dept: ONCOLOGY | Facility: CLINIC | Age: 24
End: 2024-06-12
Payer: COMMERCIAL

## 2024-06-12 NOTE — TELEPHONE ENCOUNTER
Oncology Nurse Triage - Sickle Cell Pain Crisis:    Situation: Jennifer  calling about Sickle Cell Pain Crisis, requesting to be added on for IV fluids and pain medicine    Background:     Patient's last infusion was 6/10/2024  Last clinic visit date:5/24/2024 Patricia johnson CNP  Does patient have active treatment plan?  Yes      Assessment of Symptoms:  Onset/Duration of symptoms: 1 day    Is it typical sickle cell pain? Yes   Location: legs  Character: Dull ache           Intensity: 7/10    Any radiation of pain, numbness, tingling, weakness, warmth, swelling, discoloration of arms or legs?No     Fever?No  (if yes max temperature recorded in last 24 hours):      Chest Pain Present: No     Shortness of breath: No     Other home therapies tried: HEAT/HEATING PAD and WARM BATH     Last home medication taken and when: Oxycodone and IBUProfen 6;00am    Any Refills Needed?: No     Does patient have transportation & length of time to get to clinic: Yes   Needs a ride, but okay to offer if last minute cancellation opens up.       Recommendations:   Meets protocol    1225 Jennifer called back checking in on status of infusion opening. No current appts available. Jennifer is aware and will try back tomorrow morning.       If you do not hear from the infusion center by 2pm then you will not be able to get in for an infusion today. If symptoms worsen while waiting for call back, and/or you experience fever, chills, SOB, chest pain, cough, n/v, dizziness, numbness, swelling, discoloration of extremities, then seek emergency evaluation in Emergency Department.

## 2024-06-13 ENCOUNTER — PATIENT OUTREACH (OUTPATIENT)
Dept: CARE COORDINATION | Facility: CLINIC | Age: 24
End: 2024-06-13
Payer: COMMERCIAL

## 2024-06-13 ENCOUNTER — INFUSION THERAPY VISIT (OUTPATIENT)
Dept: TRANSPLANT | Facility: CLINIC | Age: 24
End: 2024-06-13
Attending: PEDIATRICS
Payer: COMMERCIAL

## 2024-06-13 ENCOUNTER — NURSE TRIAGE (OUTPATIENT)
Dept: ONCOLOGY | Facility: CLINIC | Age: 24
End: 2024-06-13

## 2024-06-13 VITALS
RESPIRATION RATE: 18 BRPM | HEART RATE: 97 BPM | DIASTOLIC BLOOD PRESSURE: 77 MMHG | OXYGEN SATURATION: 94 % | TEMPERATURE: 98.9 F | SYSTOLIC BLOOD PRESSURE: 121 MMHG

## 2024-06-13 DIAGNOSIS — G81.10 SPASTIC HEMIPLEGIA, UNSPECIFIED ETIOLOGY, UNSPECIFIED LATERALITY (H): ICD-10-CM

## 2024-06-13 DIAGNOSIS — D57.00 SICKLE CELL PAIN CRISIS (H): Primary | ICD-10-CM

## 2024-06-13 PROCEDURE — 250N000013 HC RX MED GY IP 250 OP 250 PS 637: Performed by: PEDIATRICS

## 2024-06-13 PROCEDURE — 96374 THER/PROPH/DIAG INJ IV PUSH: CPT

## 2024-06-13 PROCEDURE — 96375 TX/PRO/DX INJ NEW DRUG ADDON: CPT

## 2024-06-13 PROCEDURE — 96376 TX/PRO/DX INJ SAME DRUG ADON: CPT

## 2024-06-13 PROCEDURE — 96361 HYDRATE IV INFUSION ADD-ON: CPT

## 2024-06-13 PROCEDURE — 258N000003 HC RX IP 258 OP 636: Mod: JZ | Performed by: PEDIATRICS

## 2024-06-13 PROCEDURE — 250N000011 HC RX IP 250 OP 636: Mod: JZ | Performed by: PEDIATRICS

## 2024-06-13 RX ORDER — HEPARIN SODIUM (PORCINE) LOCK FLUSH IV SOLN 100 UNIT/ML 100 UNIT/ML
5 SOLUTION INTRAVENOUS
Status: DISCONTINUED | OUTPATIENT
Start: 2024-06-13 | End: 2024-06-13 | Stop reason: HOSPADM

## 2024-06-13 RX ORDER — ONDANSETRON 2 MG/ML
8 INJECTION INTRAMUSCULAR; INTRAVENOUS EVERY 6 HOURS PRN
Status: DISCONTINUED | OUTPATIENT
Start: 2024-06-13 | End: 2024-06-13 | Stop reason: HOSPADM

## 2024-06-13 RX ORDER — HEPARIN SODIUM,PORCINE 10 UNIT/ML
5 VIAL (ML) INTRAVENOUS
Status: CANCELLED | OUTPATIENT
Start: 2024-07-01

## 2024-06-13 RX ORDER — KETOROLAC TROMETHAMINE 30 MG/ML
30 INJECTION, SOLUTION INTRAMUSCULAR; INTRAVENOUS ONCE
Status: CANCELLED
Start: 2024-07-01 | End: 2024-07-01

## 2024-06-13 RX ORDER — HEPARIN SODIUM (PORCINE) LOCK FLUSH IV SOLN 100 UNIT/ML 100 UNIT/ML
5 SOLUTION INTRAVENOUS
Status: CANCELLED | OUTPATIENT
Start: 2024-07-01

## 2024-06-13 RX ORDER — DIPHENHYDRAMINE HCL 25 MG
50 CAPSULE ORAL
Status: COMPLETED | OUTPATIENT
Start: 2024-06-13 | End: 2024-06-13

## 2024-06-13 RX ORDER — ONDANSETRON 2 MG/ML
8 INJECTION INTRAMUSCULAR; INTRAVENOUS EVERY 6 HOURS PRN
Status: CANCELLED
Start: 2024-07-01

## 2024-06-13 RX ORDER — DIPHENHYDRAMINE HCL 25 MG
50 CAPSULE ORAL
Status: CANCELLED
Start: 2024-07-01

## 2024-06-13 RX ORDER — ONDANSETRON 4 MG/1
8 TABLET, FILM COATED ORAL
Status: CANCELLED
Start: 2024-07-01

## 2024-06-13 RX ORDER — KETOROLAC TROMETHAMINE 30 MG/ML
30 INJECTION, SOLUTION INTRAMUSCULAR; INTRAVENOUS ONCE
Status: COMPLETED | OUTPATIENT
Start: 2024-06-13 | End: 2024-06-13

## 2024-06-13 RX ADMIN — HYDROMORPHONE HYDROCHLORIDE 1 MG: 1 INJECTION, SOLUTION INTRAMUSCULAR; INTRAVENOUS; SUBCUTANEOUS at 13:36

## 2024-06-13 RX ADMIN — DIPHENHYDRAMINE HYDROCHLORIDE 50 MG: 25 CAPSULE ORAL at 12:25

## 2024-06-13 RX ADMIN — HYDROMORPHONE HYDROCHLORIDE 1 MG: 1 INJECTION, SOLUTION INTRAMUSCULAR; INTRAVENOUS; SUBCUTANEOUS at 14:39

## 2024-06-13 RX ADMIN — SODIUM CHLORIDE, POTASSIUM CHLORIDE, SODIUM LACTATE AND CALCIUM CHLORIDE 1000 ML: 600; 310; 30; 20 INJECTION, SOLUTION INTRAVENOUS at 12:25

## 2024-06-13 RX ADMIN — HYDROMORPHONE HYDROCHLORIDE 1 MG: 1 INJECTION, SOLUTION INTRAMUSCULAR; INTRAVENOUS; SUBCUTANEOUS at 12:26

## 2024-06-13 RX ADMIN — ONDANSETRON 8 MG: 2 INJECTION INTRAMUSCULAR; INTRAVENOUS at 12:56

## 2024-06-13 RX ADMIN — Medication 5 ML: at 16:02

## 2024-06-13 RX ADMIN — KETOROLAC TROMETHAMINE 30 MG: 30 INJECTION, SOLUTION INTRAMUSCULAR; INTRAVENOUS at 12:31

## 2024-06-13 NOTE — TELEPHONE ENCOUNTER
Oncology Nurse Triage - Sickle Cell Pain Crisis:     Situation: Jennifer  calling about Sickle Cell Pain Crisis, requesting to be added on for IV fluids and pain medicine     Background:      Patient's last infusion was 6/10/2024  Last clinic visit date:5/24/2024 Patricia johnson CNP  Does patient have active treatment plan?  Yes        Assessment of Symptoms:  Onset/Duration of symptoms: 2 day     Is it typical sickle cell pain? Yes   Location: legs  Character: Sharp, generalized, Dull ache           Intensity: 10/10     Any radiation of pain, numbness, tingling, weakness, warmth, swelling, discoloration of arms or legs?No      Fever?No       Chest Pain Present: No      Shortness of breath: No      Other home therapies tried: HEAT/HEATING PAD and WARM BATH      Last home medication taken and when: Oxycodone and IBUProfen 6;00am     Any Refills Needed?: No      Does patient have transportation & length of time to get to clinic: Yes   Needs a ride, but okay to offer if last minute cancellation opens up.         Recommendations:   Meets protocol

## 2024-06-13 NOTE — PROGRESS NOTES
Social Work - Transportation  [facility]    Data/Intervention:  Patient Name: Jennifer Cervantes Goes By: Jennifer    /Age: 2000 (24 year old)    Referral From: Patient  Reason for Referral:  support requested for patient transportation needs for today's appointment.  Assessment:  called [employer] to arrange ride through patient's insurance. [employer] arranged  for patient from home with [employer]. Patient will need to call [phone] when ready for return ride home.  Plan: Patient is aware of the transportation plan.  available to assist with any other needs.    CARLOS Chavez,[facility]  Hematology/Oncology Social Worker  Phone:[phone] Pager: [phone]

## 2024-06-13 NOTE — PROGRESS NOTES
Infusion Nursing Note:  Jennifer Cervantes presents today for IVF and pain meds.    Patient seen by provider today: No   present during visit today: Not Applicable.    Note: Allergies and home meds reviewed. Pt received 1L LR, x3 doses Dilaudid, IV Toradol, IV Zofran, and PO Benadryl. Pt rated pain 8/10 upon admission and a 4 /10 when discharging.       Intravenous Access:  Implanted Port.    Treatment Conditions:  Lab Results   Component Value Date    HGB 7.6 (L) 06/09/2024    WBC 10.3 06/09/2024    ANEU 9.0 (H) 03/17/2024    ANEUTAUTO 6.5 06/09/2024     (H) 06/09/2024        Lab Results   Component Value Date     06/09/2024    POTASSIUM 3.9 06/09/2024    MAG 2.0 05/16/2024    CR 0.51 06/09/2024    MICAH 8.9 06/09/2024    BILITOTAL 2.4 (H) 06/09/2024    ALBUMIN 4.6 06/09/2024    ALT 31 06/09/2024    AST 39 06/09/2024       Results reviewed, labs MET treatment parameters, ok to proceed with treatment.      Post Infusion Assessment:  Patient tolerated infusion without incident.  Blood return noted pre and post infusion.       Discharge Plan:   Patient and/or family verbalized understanding of discharge instructions and all questions answered.      Radha Galindo RN

## 2024-06-14 DIAGNOSIS — D57.00 SICKLE CELL PAIN CRISIS (H): ICD-10-CM

## 2024-06-14 RX ORDER — OXYCODONE HYDROCHLORIDE 10 MG/1
10 TABLET ORAL EVERY 4 HOURS PRN
Qty: 20 TABLET | Refills: 0 | Status: SHIPPED | OUTPATIENT
Start: 2024-06-14 | End: 2024-06-19

## 2024-06-14 NOTE — TELEPHONE ENCOUNTER
Narcotic Refill Request    Medication(s) requested:  Oxycodone  Person Requesting Refill: Patient  What pain is the medication treating: Sickle cell pain  How is the medication being taken?: 1 tablet every 6 hrs  Does pt have enough for today? Yes  Is pain being adequately controlled on the current regimen?: Yes  Experiencing any side effects from medication?: No    Date of most recent appointment:  05/24/24 w/Patricia Mantilla  Any No Show Visits: No  Next appointment:   06/19/24 w/Daya Nascimento  Last fill date and by whom:  06/10/24 by Patricia Mantilla   Reviewed: No access    Routed/Paged provider: Patricia Mantilla

## 2024-06-15 ENCOUNTER — HOSPITAL ENCOUNTER (EMERGENCY)
Facility: CLINIC | Age: 24
Discharge: HOME | End: 2024-06-16
Attending: EMERGENCY MEDICINE | Admitting: EMERGENCY MEDICINE
Payer: COMMERCIAL

## 2024-06-15 DIAGNOSIS — D57.00 SICKLE CELL PAIN CRISIS (H): ICD-10-CM

## 2024-06-15 PROCEDURE — 99284 EMERGENCY DEPT VISIT MOD MDM: CPT | Mod: 25 | Performed by: EMERGENCY MEDICINE

## 2024-06-15 PROCEDURE — 99285 EMERGENCY DEPT VISIT HI MDM: CPT | Performed by: EMERGENCY MEDICINE

## 2024-06-15 RX ORDER — KETOROLAC TROMETHAMINE 15 MG/ML
10 INJECTION, SOLUTION INTRAMUSCULAR; INTRAVENOUS ONCE
Status: COMPLETED | OUTPATIENT
Start: 2024-06-15 | End: 2024-06-16

## 2024-06-15 RX ORDER — ONDANSETRON 2 MG/ML
8 INJECTION INTRAMUSCULAR; INTRAVENOUS ONCE
Status: COMPLETED | OUTPATIENT
Start: 2024-06-15 | End: 2024-06-16

## 2024-06-16 ENCOUNTER — HEALTH MAINTENANCE LETTER (OUTPATIENT)
Age: 24
End: 2024-06-16

## 2024-06-16 VITALS
OXYGEN SATURATION: 96 % | SYSTOLIC BLOOD PRESSURE: 128 MMHG | RESPIRATION RATE: 16 BRPM | TEMPERATURE: 97.6 F | HEART RATE: 81 BPM | DIASTOLIC BLOOD PRESSURE: 89 MMHG

## 2024-06-16 LAB
ANION GAP SERPL CALCULATED.3IONS-SCNC: 12 MMOL/L (ref 7–15)
BASOPHILS # BLD AUTO: 0.2 10E3/UL (ref 0–0.2)
BASOPHILS NFR BLD AUTO: 1 %
BUN SERPL-MCNC: 8.9 MG/DL (ref 6–20)
CALCIUM SERPL-MCNC: 8.8 MG/DL (ref 8.6–10)
CHLORIDE SERPL-SCNC: 108 MMOL/L (ref 98–107)
CREAT SERPL-MCNC: 0.49 MG/DL (ref 0.51–0.95)
DEPRECATED HCO3 PLAS-SCNC: 22 MMOL/L (ref 22–29)
EGFRCR SERPLBLD CKD-EPI 2021: >90 ML/MIN/1.73M2
EOSINOPHIL # BLD AUTO: 0.4 10E3/UL (ref 0–0.7)
EOSINOPHIL NFR BLD AUTO: 3 %
ERYTHROCYTE [DISTWIDTH] IN BLOOD BY AUTOMATED COUNT: 24.8 % (ref 10–15)
GLUCOSE SERPL-MCNC: 113 MG/DL (ref 70–99)
HCT VFR BLD AUTO: 21.7 % (ref 35–47)
HGB BLD-MCNC: 7.4 G/DL (ref 11.7–15.7)
IMM GRANULOCYTES # BLD: 0.1 10E3/UL
IMM GRANULOCYTES NFR BLD: 1 %
LYMPHOCYTES # BLD AUTO: 2.2 10E3/UL (ref 0.8–5.3)
LYMPHOCYTES NFR BLD AUTO: 13 %
MCH RBC QN AUTO: 30.1 PG (ref 26.5–33)
MCHC RBC AUTO-ENTMCNC: 34.1 G/DL (ref 31.5–36.5)
MCV RBC AUTO: 88 FL (ref 78–100)
MONOCYTES # BLD AUTO: 0.9 10E3/UL (ref 0–1.3)
MONOCYTES NFR BLD AUTO: 5 %
NEUTROPHILS # BLD AUTO: 13.3 10E3/UL (ref 1.6–8.3)
NEUTROPHILS NFR BLD AUTO: 77 %
NRBC # BLD AUTO: 0.6 10E3/UL
NRBC BLD AUTO-RTO: 3 /100
PLATELET # BLD AUTO: 486 10E3/UL (ref 150–450)
POTASSIUM SERPL-SCNC: 3.6 MMOL/L (ref 3.4–5.3)
RBC # BLD AUTO: 2.46 10E6/UL (ref 3.8–5.2)
RETICS # AUTO: 0.55 10E6/UL (ref 0.03–0.1)
RETICS/RBC NFR AUTO: 22.6 % (ref 0.5–2)
SODIUM SERPL-SCNC: 142 MMOL/L (ref 135–145)
WBC # BLD AUTO: 17.1 10E3/UL (ref 4–11)

## 2024-06-16 PROCEDURE — 85045 AUTOMATED RETICULOCYTE COUNT: CPT | Performed by: EMERGENCY MEDICINE

## 2024-06-16 PROCEDURE — 250N000011 HC RX IP 250 OP 636: Mod: JZ | Performed by: EMERGENCY MEDICINE

## 2024-06-16 PROCEDURE — 85025 COMPLETE CBC W/AUTO DIFF WBC: CPT | Performed by: EMERGENCY MEDICINE

## 2024-06-16 PROCEDURE — 36415 COLL VENOUS BLD VENIPUNCTURE: CPT | Performed by: EMERGENCY MEDICINE

## 2024-06-16 PROCEDURE — 96374 THER/PROPH/DIAG INJ IV PUSH: CPT | Performed by: EMERGENCY MEDICINE

## 2024-06-16 PROCEDURE — 96375 TX/PRO/DX INJ NEW DRUG ADDON: CPT | Performed by: EMERGENCY MEDICINE

## 2024-06-16 PROCEDURE — 96361 HYDRATE IV INFUSION ADD-ON: CPT | Performed by: EMERGENCY MEDICINE

## 2024-06-16 PROCEDURE — 80048 BASIC METABOLIC PNL TOTAL CA: CPT | Performed by: EMERGENCY MEDICINE

## 2024-06-16 PROCEDURE — 258N000003 HC RX IP 258 OP 636: Mod: JZ | Performed by: EMERGENCY MEDICINE

## 2024-06-16 RX ORDER — HEPARIN SODIUM (PORCINE) LOCK FLUSH IV SOLN 100 UNIT/ML 100 UNIT/ML
5-10 SOLUTION INTRAVENOUS
Status: DISCONTINUED | OUTPATIENT
Start: 2024-06-16 | End: 2024-06-16 | Stop reason: HOSPADM

## 2024-06-16 RX ADMIN — HYDROMORPHONE HYDROCHLORIDE 1 MG: 1 INJECTION, SOLUTION INTRAMUSCULAR; INTRAVENOUS; SUBCUTANEOUS at 00:09

## 2024-06-16 RX ADMIN — KETOROLAC TROMETHAMINE 10 MG: 15 INJECTION, SOLUTION INTRAMUSCULAR; INTRAVENOUS at 00:11

## 2024-06-16 RX ADMIN — HYDROMORPHONE HYDROCHLORIDE 1 MG: 1 INJECTION, SOLUTION INTRAMUSCULAR; INTRAVENOUS; SUBCUTANEOUS at 01:08

## 2024-06-16 RX ADMIN — SODIUM CHLORIDE 1000 ML: 9 INJECTION, SOLUTION INTRAVENOUS at 00:11

## 2024-06-16 RX ADMIN — HYDROMORPHONE HYDROCHLORIDE 1 MG: 1 INJECTION, SOLUTION INTRAMUSCULAR; INTRAVENOUS; SUBCUTANEOUS at 02:12

## 2024-06-16 RX ADMIN — ONDANSETRON 8 MG: 2 INJECTION INTRAMUSCULAR; INTRAVENOUS at 00:06

## 2024-06-16 RX ADMIN — HEPARIN 5 ML: 100 SYRINGE at 03:14

## 2024-06-16 ASSESSMENT — ACTIVITIES OF DAILY LIVING (ADL)
ADLS_ACUITY_SCORE: 37

## 2024-06-16 NOTE — ED PROVIDER NOTES
ED Provider Note  [facility]      History     Chief Complaint   Patient presents with    Sickle Cell Pain Crisis     HPI  Jennifer Cervantes is a 24 year old female with a past medical history notable for sickle cell anemia with frequent pain crises, iron overload due to chronic transfusions, DVT, asthma, stroke leading to cognitive delays and right upper extremity hemiparesis who presents to the ED with abdominal pain.  This is her normal sickle cell pain.  She has been vomiting as well.  No fevers.    Past Medical History  Past Medical History:   Diagnosis Date    Anxiety     Bleeding disorder (H24)     Blood clotting disorder (H24)     Cerebral infarction (H) 2015    Cognitive developmental delay     low IQ. 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 side (H)     right hand contractures    Iron overload due to repeated red blood cell transfusions     Migraines     Multiple subsegmental pulmonary emboli without acute cor pulmonale (H) 02/01/2021    Oppositional defiant behavior     Presence of intrauterine contraceptive device 2/18/2020    Superficial venous thrombosis of arm, right 03/25/2021    Uncomplicated asthma      Past Surgical History:   Procedure Laterality Date    AS INSERT TUNNELED CV 2 CATH W/O PORT/PUMP      CHOLECYSTECTOMY      CV RIGHT HEART CATH MEASUREMENTS RECORDED N/A 11/18/2021    Procedure: Right Heart Cath;  Surgeon: Jackson Stauffer MD;  Location:  HEART CARDIAC CATH LAB    INSERT PORT VASCULAR ACCESS Left 4/21/2021    Procedure: INSERTION, VASCULAR ACCESS PORT (NOT SURE ON SIDE UNTIL REMOVAL);  Surgeon: Rajan More MD;  Location: [facility] OR    IR CHEST PORT PLACEMENT > 5 YRS OF AGE  4/21/2021    IR CVC NON TUNNEL LINE REMOVAL  5/6/2021    IR CVC NON TUNNEL PLACEMENT > 5 YRS  04/07/2020    IR CVC NON TUNNEL PLACEMENT > 5 YRS  4/30/2021    IR  CVC NON TUNNEL PLACEMENT > 5 YRS  9/7/2022    IR PORT REMOVAL LEFT  4/21/2021    REMOVE PORT VASCULAR ACCESS Left 4/21/2021    Procedure: REMOVAL, VASCULAR ACCESS PORT LEFT;  Surgeon: Rajan More MD;  Location: [facility] OR    REPAIR TENDON ELBOW Right 10/02/2019    Procedure: Right Elbow Flexor Lengthening, Flexor Pronator Slide Of Wrist and Finger, Thumb Adductor Lengthening;  Surgeon: Anai Franco MD;  Location: [facility] OR    TONSILLECTOMY Bilateral 10/02/2019    Procedure: Bilateral Tonsillectomy;  Surgeon: Farhana Guy MD;  Location: [facility] OR    ZZC BREAST REDUCTION (INCLUDES LIPO) TIER 3 Bilateral 04/23/2019     acetaminophen (TYLENOL) 325 MG tablet  albuterol (PROAIR HFA/PROVENTIL HFA/VENTOLIN HFA) 108 (90 Base) MCG/ACT inhaler  albuterol (PROVENTIL) (2.5 MG/3ML) 0.083% neb solution  aspirin (ASA) 81 MG chewable tablet  budesonide-formoterol (SYMBICORT) 160-4.5 MCG/ACT Inhaler  deferasirox (JADENU) 360 MG tablet  Deferiprone, Twice Daily, 1000 MG TABS  EPINEPHrine (ANY BX GENERIC EQUIV) 0.3 MG/0.3ML injection 2-pack  Hydroxyurea 1000 MG TABS  ibuprofen (ADVIL/MOTRIN) 600 MG tablet  ibuprofen (ADVIL/MOTRIN) 800 MG tablet  melatonin 5 MG tablet  methocarbamol (ROBAXIN) 750 MG tablet  naloxone (NARCAN) 4 MG/0.1ML nasal spray  naloxone (NARCAN) 4 MG/0.1ML nasal spray  omeprazole (PRILOSEC) 20 MG DR capsule  ondansetron (ZOFRAN) 8 MG tablet  oxyCODONE IR (ROXICODONE) 10 MG tablet      Allergies   Allergen Reactions    Contrast Dye      Hives and breathing issues    Fish-Derived Products Hives    Seafood Hives    Adhesive Tape Hives     Primipore dressing causes hives    Gadolinium     Iodinated Contrast Media      Family History  Family History   Problem Relation Age of Onset    Sickle Cell Trait Mother     Hypertension Mother     Asthma Mother     Sickle Cell Trait Father     Glaucoma No family hx of     Macular Degeneration No family hx of     Diabetes No family hx of     Gout No family hx of       Social History   Social History     Tobacco Use    Smoking status: Never     Passive exposure: Never    Smokeless tobacco: Never   Substance Use Topics    Alcohol use: Not Currently     Alcohol/week: 0.0 standard drinks of alcohol    Drug use: Never      A medically appropriate review of systems was performed with pertinent positives and negatives noted in the HPI, and all other systems negative.    Physical Exam   BP: 126/88  Pulse: 78  Temp: 98.3  F (36.8  C)  Resp: 18  SpO2: 97 %  Physical Exam  Constitutional:       General: She is in acute distress.      Appearance: She is not diaphoretic.   HENT:      Head: Normocephalic and atraumatic.      Right Ear: External ear normal.      Left Ear: External ear normal.      Nose: Nose normal.   Eyes:      Extraocular Movements: Extraocular movements intact.      Conjunctiva/sclera: Conjunctivae normal.   Cardiovascular:      Rate and Rhythm: Normal rate and regular rhythm.      Heart sounds: Normal heart sounds.   Pulmonary:      Effort: Pulmonary effort is normal. No respiratory distress.      Breath sounds: Normal breath sounds.   Abdominal:      General: There is no distension.      Palpations: Abdomen is soft.      Tenderness: There is no abdominal tenderness.   Musculoskeletal:         General: No swelling or deformity.      Cervical back: Normal range of motion and neck supple.   Skin:     General: Skin is warm and dry.   Neurological:      Mental Status: Mental status is at baseline.      Comments: Alert, oriented   Psychiatric:      Comments: Tearful           ED Course, Procedures, & Data      Procedures            No results found for any visits on 06/15/24.  Medications   sodium chloride 0.9% BOLUS 1,000 mL (has no administration in time range)   ondansetron (ZOFRAN) injection 8 mg (has no administration in time range)   HYDROmorphone (DILAUDID) injection 1 mg (has no administration in time range)   ketorolac (TORADOL) injection 10 mg (has no  administration in time range)     Labs Ordered and Resulted from Time of ED Arrival to Time of ED Departure - No data to display  No orders to display   2023 Emergency Medicine Coding Guide from [employer]  on 6/16/2024  ** All calculations should be rechecked by clinician prior to use **    RESULT SUMMARY:  5 Estimated Level of Service  Problems: High (5)    Risk: High (5)    Data: Moderate (4)    NARRATIVE MDM:  This patient's problem complexity is High as patient: with chronic illness(es) with severe exacerbation/progression/side effects.   This patient's risk is High due to: overall presentation requiring evaluation for a potentially High-risk process.  This patient's data complexity is Moderate due to:   -multiple tests ordered        INPUTS:  Number and Complexity --> 1 = 5: chronic illness w/severe exacerbation (a)  Risk level --> 4 = High  Tests ordered --> 3 = ?3  Tests results reviewed (excluding labs) --> 0 = 0  Prior external notes reviewed --> 0 = 0  Assessment requiring and independent historian --> 0 = No  Independent interpretation of tests --> 0 = No  Discussed management/test interpretation w/external professional --> 0 = No       Assessment & Plan    24-year-old female with a history of sickle cell presents to us with chief complaint of pain crisis.  She states this is her usual pain.  Her pain protocol as well as labs have been ordered.  Patient currently signed out to the oncoming physician pending reassessment and disposition decision    I have reviewed the nursing notes. I have reviewed the findings, diagnosis, plan and need for follow up with the patient.    New Prescriptions    No medications on file       Final diagnoses:   Sickle cell pain crisis (H)       Mykel Luz DO  [facility] EMERGENCY DEPARTMENT  6/15/2024     Mykel Luz DO  06/16/24 0003

## 2024-06-16 NOTE — ED NOTES
Emergency Department Patient Sign-out       Brief HPI and ED course:  Patient is a 24 year old female signed out to me by Dr. Luz.  See initial ED Provider note for details of the presentation. In brief, patient with abdominal pain with quality similar to past sickle flares.     Vitals:   Patient Vitals for the past 24 hrs:   BP Temp Temp src Pulse Resp SpO2   06/15/24 2330 126/88 98.3  F (36.8  C) Oral 78 18 97 %     Received Sign-out Plan:    Pending studies include: none      Plan:   - f/u pain control after 3 doses of meds per ED care plan    Events after assuming care:  After medications, patient reported adequate symptom control and request for discharge home. Patient discharged home with recommendation for close f/u with her hematologist, return to ED if worsening symptoms or other urgent health concerns.      --  Huang Domingo MD   Emergency Medicine         Huang Domingo MD  06/16/24 0809

## 2024-06-16 NOTE — ED TRIAGE NOTES
Patient reported sharp pain to abdomen and pain all over. She said pain started about an hour ago. She also c/o nausea and vomiting.      Triage Assessment (Adult)       Row Name 06/15/24 6709          Triage Assessment    Airway WDL WDL        Respiratory WDL    Respiratory WDL WDL        Skin Circulation/Temperature WDL    Skin Circulation/Temperature WDL WDL        Cardiac WDL    Cardiac WDL WDL        Peripheral/Neurovascular WDL    Peripheral Neurovascular WDL WDL        Cognitive/Neuro/Behavioral WDL    Cognitive/Neuro/Behavioral WDL WDL

## 2024-06-16 NOTE — DISCHARGE INSTRUCTIONS
Instructions from your doctor today:  Emergency Department (ED) testing is focused on the potential causes of your symptoms that are the most dangerous possibilities, and cannot cover every possibility. Based on the evaluation, it was deemed sufficiently safe to discharge and continue management through the clinics. Thus, follow-up is very important to assess for improvement/worsening, potential further testing, and potential treatment adjustments. If you were given opioid pain medications or other medications that can make you drowsy while in the ED, you should not drive for at least several hours and not until you feel completely back to normal.     Please make an appointment to follow up with:  - your hematologist as soon as possible  - If you do not have a primary care provider, you can be seen in follow-up and establish care by calling any of the clinics below:     - Primary Care Center (phone: [phone])     - Primary Care / [facility] (phone: [phone])   - Have your clinic provider review the results from today's visit with you again, including any potential follow-up or additional testing that may be needed based on the results. Occasionally, incidental findings are found on later review by radiologists that may need follow-up.     Return to the Emergency Department immediately if you have worsening symptoms, or any other urgent health concerns.

## 2024-06-17 ENCOUNTER — INFUSION THERAPY VISIT (OUTPATIENT)
Dept: ONCOLOGY | Facility: CLINIC | Age: 24
End: 2024-06-17
Attending: PEDIATRICS
Payer: COMMERCIAL

## 2024-06-17 ENCOUNTER — PATIENT OUTREACH (OUTPATIENT)
Dept: CARE COORDINATION | Facility: CLINIC | Age: 24
End: 2024-06-17
Payer: COMMERCIAL

## 2024-06-17 ENCOUNTER — NURSE TRIAGE (OUTPATIENT)
Dept: ONCOLOGY | Facility: CLINIC | Age: 24
End: 2024-06-17
Payer: COMMERCIAL

## 2024-06-17 VITALS
RESPIRATION RATE: 16 BRPM | TEMPERATURE: 98.6 F | DIASTOLIC BLOOD PRESSURE: 85 MMHG | SYSTOLIC BLOOD PRESSURE: 121 MMHG | OXYGEN SATURATION: 97 % | HEART RATE: 95 BPM

## 2024-06-17 DIAGNOSIS — R10.13 EPIGASTRIC PAIN: Primary | ICD-10-CM

## 2024-06-17 DIAGNOSIS — D57.00 SICKLE CELL PAIN CRISIS (H): Primary | ICD-10-CM

## 2024-06-17 DIAGNOSIS — G81.10 SPASTIC HEMIPLEGIA, UNSPECIFIED ETIOLOGY, UNSPECIFIED LATERALITY (H): ICD-10-CM

## 2024-06-17 DIAGNOSIS — R11.2 NAUSEA AND VOMITING, UNSPECIFIED VOMITING TYPE: ICD-10-CM

## 2024-06-17 DIAGNOSIS — K21.9 GASTROESOPHAGEAL REFLUX DISEASE, UNSPECIFIED WHETHER ESOPHAGITIS PRESENT: ICD-10-CM

## 2024-06-17 PROCEDURE — 96361 HYDRATE IV INFUSION ADD-ON: CPT

## 2024-06-17 PROCEDURE — 96376 TX/PRO/DX INJ SAME DRUG ADON: CPT

## 2024-06-17 PROCEDURE — 250N000013 HC RX MED GY IP 250 OP 250 PS 637: Performed by: PEDIATRICS

## 2024-06-17 PROCEDURE — 250N000011 HC RX IP 250 OP 636: Mod: JZ | Performed by: PEDIATRICS

## 2024-06-17 PROCEDURE — 258N000003 HC RX IP 258 OP 636: Mod: JZ | Performed by: PEDIATRICS

## 2024-06-17 PROCEDURE — 96374 THER/PROPH/DIAG INJ IV PUSH: CPT

## 2024-06-17 PROCEDURE — 96375 TX/PRO/DX INJ NEW DRUG ADDON: CPT

## 2024-06-17 RX ORDER — KETOROLAC TROMETHAMINE 30 MG/ML
30 INJECTION, SOLUTION INTRAMUSCULAR; INTRAVENOUS ONCE
Status: COMPLETED | OUTPATIENT
Start: 2024-06-17 | End: 2024-06-17

## 2024-06-17 RX ORDER — ONDANSETRON 8 MG/1
8 TABLET, ORALLY DISINTEGRATING ORAL EVERY 8 HOURS PRN
Qty: 40 TABLET | Refills: 4 | Status: SHIPPED | OUTPATIENT
Start: 2024-06-17

## 2024-06-17 RX ORDER — DIPHENHYDRAMINE HCL 25 MG
50 CAPSULE ORAL
Status: CANCELLED
Start: 2024-07-01

## 2024-06-17 RX ORDER — HEPARIN SODIUM,PORCINE 10 UNIT/ML
5 VIAL (ML) INTRAVENOUS
Status: CANCELLED | OUTPATIENT
Start: 2024-07-01

## 2024-06-17 RX ORDER — ONDANSETRON 2 MG/ML
8 INJECTION INTRAMUSCULAR; INTRAVENOUS EVERY 6 HOURS PRN
Status: CANCELLED
Start: 2024-07-01

## 2024-06-17 RX ORDER — HEPARIN SODIUM (PORCINE) LOCK FLUSH IV SOLN 100 UNIT/ML 100 UNIT/ML
5 SOLUTION INTRAVENOUS
Status: CANCELLED | OUTPATIENT
Start: 2024-07-01

## 2024-06-17 RX ORDER — ONDANSETRON 2 MG/ML
8 INJECTION INTRAMUSCULAR; INTRAVENOUS EVERY 6 HOURS PRN
Status: DISCONTINUED | OUTPATIENT
Start: 2024-06-17 | End: 2024-06-17 | Stop reason: HOSPADM

## 2024-06-17 RX ORDER — ONDANSETRON 4 MG/1
8 TABLET, FILM COATED ORAL
Status: CANCELLED
Start: 2024-07-01

## 2024-06-17 RX ORDER — KETOROLAC TROMETHAMINE 30 MG/ML
30 INJECTION, SOLUTION INTRAMUSCULAR; INTRAVENOUS ONCE
Status: CANCELLED
Start: 2024-07-01 | End: 2024-07-01

## 2024-06-17 RX ORDER — DIPHENHYDRAMINE HCL 25 MG
50 CAPSULE ORAL
Status: COMPLETED | OUTPATIENT
Start: 2024-06-17 | End: 2024-06-17

## 2024-06-17 RX ORDER — HEPARIN SODIUM (PORCINE) LOCK FLUSH IV SOLN 100 UNIT/ML 100 UNIT/ML
5 SOLUTION INTRAVENOUS
Status: DISCONTINUED | OUTPATIENT
Start: 2024-06-17 | End: 2024-06-17 | Stop reason: HOSPADM

## 2024-06-17 RX ADMIN — Medication 5 ML: at 15:43

## 2024-06-17 RX ADMIN — KETOROLAC TROMETHAMINE 30 MG: 30 INJECTION, SOLUTION INTRAMUSCULAR; INTRAVENOUS at 13:14

## 2024-06-17 RX ADMIN — HYDROMORPHONE HYDROCHLORIDE 1 MG: 1 INJECTION, SOLUTION INTRAMUSCULAR; INTRAVENOUS; SUBCUTANEOUS at 14:17

## 2024-06-17 RX ADMIN — HYDROMORPHONE HYDROCHLORIDE 1 MG: 1 INJECTION, SOLUTION INTRAMUSCULAR; INTRAVENOUS; SUBCUTANEOUS at 15:16

## 2024-06-17 RX ADMIN — SODIUM CHLORIDE, POTASSIUM CHLORIDE, SODIUM LACTATE AND CALCIUM CHLORIDE 1000 ML: 600; 310; 30; 20 INJECTION, SOLUTION INTRAVENOUS at 13:08

## 2024-06-17 RX ADMIN — ONDANSETRON 8 MG: 2 INJECTION INTRAMUSCULAR; INTRAVENOUS at 13:17

## 2024-06-17 RX ADMIN — DIPHENHYDRAMINE HYDROCHLORIDE 50 MG: 25 CAPSULE ORAL at 13:12

## 2024-06-17 RX ADMIN — HYDROMORPHONE HYDROCHLORIDE 1 MG: 1 INJECTION, SOLUTION INTRAMUSCULAR; INTRAVENOUS; SUBCUTANEOUS at 13:12

## 2024-06-17 NOTE — CONFIDENTIAL NOTE
Oncology Nurse Triage - Sickle Cell Pain Crisis:    Situation: Jennifer  calling about Sickle Cell Pain Crisis, requesting to be added on for IV fluids and pain medicine    Background:     Patient's last infusion was 6/16  Last clinic visit date:5/24  Does patient have active treatment plan?  Yes      Assessment of Symptoms:  Onset/Duration of symptoms: 3 day    Is it typical sickle cell pain? Yes   Location: Generalized  Character: Sharp           Intensity: 8/10    Any radiation of pain, numbness, tingling, weakness, warmth, swelling, discoloration of arms or legs?No     Fever?No  (if yes max temperature recorded in last 24 hours):      Chest Pain Present: No     Shortness of breath: No     Other home therapies tried: HEAT/HEATING PAD     Last home medication taken and when: Oxy at 6 am    Any Refills Needed?: No     Does patient have transportation & length of time to get to clinic: Yes         Recommendations:     Approved for infusion today by Dr. Duncan    If you do not hear from the infusion center by 2pm then you will not be able to get in for an infusion today. If symptoms worsen while waiting for call back, and/or you experience fever, chills, SOB, chest pain, cough, n/v, dizziness, numbness, swelling, discoloration of extremities, then seek emergency evaluation in Emergency Department.     Please note, if you are late for your appt, you risk losing your infusion appt as it may delay another patient's infusion who arrived on time.

## 2024-06-17 NOTE — TELEPHONE ENCOUNTER
1200 Offer for infusion if pt can come now (by 12:30-12:45pm) Jennifer in agreement and will come now for infusion, will need assist with ride home.     1210 Scheduling request sent to add on   message sent to assist with transportation home.

## 2024-06-17 NOTE — PROGRESS NOTES
Community Health Worker Note: Telephone Call  Oncology Clinic     Data/Intervention:  Patient Name:  Jennifer Cervantes DOB/Age: [birth year]     Call From: Triage Nurse        Reason for Call:  Transportation      Assessment:     CHW also called [employer]  ([phone] )  to arrange ride through patient's insurance.  Zahroof Valvese activated ride with [employer] transportation  Patient will need to call when ready for return ride home [phone]. Talked to patient and she will call them to setup  time.     Plan:  Patient is aware of the transportation plan. /CHW available to assist with any other needs.      Isaura OTTO Community Health Worker  Clinic Care Coordination  [facility]  Phone: [phone]

## 2024-06-17 NOTE — PROGRESS NOTES
Infusion Nursing Note:  Jennifer Cervantes presents today for IV Fluids/Pain Meds.    Patient seen by provider today: No   present during visit today: Not Applicable.    Note: Was in the ED on Saturday for pain and nausea/vomiting. Jennifer reported she has been having abdominal aches that her team is aware of however this weekend she started having stabbing abdominal pain.    She has been unable to eat and take her medications d/t the pain and vomiting. She ate noodles yesterday but nothing else since.     She has not been taking omperazole d/t being out of the medication. She has been unable to take zofran as she cannot keep her medications down.    Discussed with patient resuming her omperazole and taking zofran as scheduled for the next 1-2 days to see if symptoms improve. She is agreeable to this.     Dr. Duncan notified of symptoms and asked for omperazole refill. Asked for zofran to be changed to ODT as opposed to tablets.     Per written communication with Dr. Duncan/Tammie Hale RN 06/17/24 @ 0356  - I can send omperazole and ODT; will add refills to each    Encouraged patient to follow up with care team on Monday during visit about moving her EGD to earlier date than November.    Reported 7/10 generalized pain upon arrival to infusion. Received IV dilaudid x 3, toradol, and benadryl. Pain down to 4/10 at time of discharge. Patient felt comfortable to discharge home and continue to manage her pain.      Intravenous Access:  Implanted Port.    Treatment Conditions:  Not Applicable.      Post Infusion Assessment:  Patient tolerated infusion without incident.  Blood return noted pre and post infusion.  Site patent and intact, free from redness, edema or discomfort.  No evidence of extravasations.  Access discontinued per protocol.       Discharge Plan:   Prescription refills given for zofran and omperazole.  Discharge instructions reviewed with: Patient.  Patient and/or family verbalized understanding of  discharge instructions and all questions answered.  AVS to patient via [employer].  Patient will return 6/20 for next appointment.   Patient discharged in stable condition accompanied by: self.  Departure Mode: Ambulatory.      Earnestine Hale RN

## 2024-06-17 NOTE — PATIENT INSTRUCTIONS
[facility] Triage and after hours / weekends / holidays:  [phone] option 5, option 2    Please call the triage or after hours line if you experience a temperature greater than or equal to 100.4, shaking chills, have uncontrolled nausea, vomiting and/or diarrhea, dizziness, shortness of breath, chest pain, bleeding, unexplained bruising, or if you have any other new/concerning symptoms, questions or concerns.      If you are having any concerning symptoms or wish to speak to a provider before your next infusion visit, please call triage to notify your care team so we can adequately serve you.     If you need a refill on a narcotic prescription or other medication, please call before your infusion appointment.

## 2024-06-19 ENCOUNTER — NURSE TRIAGE (OUTPATIENT)
Dept: ONCOLOGY | Facility: CLINIC | Age: 24
End: 2024-06-19
Payer: COMMERCIAL

## 2024-06-19 ENCOUNTER — PATIENT OUTREACH (OUTPATIENT)
Dept: CARE COORDINATION | Facility: CLINIC | Age: 24
End: 2024-06-19
Payer: COMMERCIAL

## 2024-06-19 ENCOUNTER — VIRTUAL VISIT (OUTPATIENT)
Dept: ONCOLOGY | Facility: CLINIC | Age: 24
End: 2024-06-19
Attending: PEDIATRICS
Payer: COMMERCIAL

## 2024-06-19 ENCOUNTER — INFUSION THERAPY VISIT (OUTPATIENT)
Dept: TRANSPLANT | Facility: CLINIC | Age: 24
End: 2024-06-19
Attending: PEDIATRICS
Payer: COMMERCIAL

## 2024-06-19 VITALS
DIASTOLIC BLOOD PRESSURE: 85 MMHG | HEART RATE: 95 BPM | HEIGHT: 64 IN | SYSTOLIC BLOOD PRESSURE: 121 MMHG | WEIGHT: 158 LBS | BODY MASS INDEX: 26.98 KG/M2

## 2024-06-19 VITALS
DIASTOLIC BLOOD PRESSURE: 60 MMHG | TEMPERATURE: 97.7 F | SYSTOLIC BLOOD PRESSURE: 123 MMHG | OXYGEN SATURATION: 98 % | HEART RATE: 81 BPM | RESPIRATION RATE: 18 BRPM

## 2024-06-19 DIAGNOSIS — G81.10 SPASTIC HEMIPLEGIA, UNSPECIFIED ETIOLOGY, UNSPECIFIED LATERALITY (H): ICD-10-CM

## 2024-06-19 DIAGNOSIS — D57.00 SICKLE CELL PAIN CRISIS (H): Primary | ICD-10-CM

## 2024-06-19 DIAGNOSIS — R11.2 NAUSEA AND VOMITING, UNSPECIFIED VOMITING TYPE: ICD-10-CM

## 2024-06-19 DIAGNOSIS — R10.13 EPIGASTRIC PAIN: ICD-10-CM

## 2024-06-19 PROCEDURE — 96375 TX/PRO/DX INJ NEW DRUG ADDON: CPT

## 2024-06-19 PROCEDURE — 96376 TX/PRO/DX INJ SAME DRUG ADON: CPT

## 2024-06-19 PROCEDURE — 96374 THER/PROPH/DIAG INJ IV PUSH: CPT

## 2024-06-19 PROCEDURE — 258N000003 HC RX IP 258 OP 636: Mod: JZ | Performed by: PEDIATRICS

## 2024-06-19 PROCEDURE — 96361 HYDRATE IV INFUSION ADD-ON: CPT

## 2024-06-19 PROCEDURE — 250N000013 HC RX MED GY IP 250 OP 250 PS 637: Performed by: PEDIATRICS

## 2024-06-19 PROCEDURE — 99215 OFFICE O/P EST HI 40 MIN: CPT | Mod: 95

## 2024-06-19 PROCEDURE — 250N000011 HC RX IP 250 OP 636: Mod: JZ | Performed by: PEDIATRICS

## 2024-06-19 RX ORDER — METHYLPREDNISOLONE SODIUM SUCCINATE 125 MG/2ML
125 INJECTION, POWDER, LYOPHILIZED, FOR SOLUTION INTRAMUSCULAR; INTRAVENOUS
Status: CANCELLED
Start: 2024-06-20

## 2024-06-19 RX ORDER — ONDANSETRON 2 MG/ML
8 INJECTION INTRAMUSCULAR; INTRAVENOUS EVERY 6 HOURS PRN
Status: DISCONTINUED | OUTPATIENT
Start: 2024-06-19 | End: 2024-06-19 | Stop reason: HOSPADM

## 2024-06-19 RX ORDER — KETOROLAC TROMETHAMINE 30 MG/ML
30 INJECTION, SOLUTION INTRAMUSCULAR; INTRAVENOUS ONCE
Status: COMPLETED | OUTPATIENT
Start: 2024-06-19 | End: 2024-06-19

## 2024-06-19 RX ORDER — DIPHENHYDRAMINE HYDROCHLORIDE 50 MG/ML
50 INJECTION INTRAMUSCULAR; INTRAVENOUS
Status: CANCELLED
Start: 2024-06-20

## 2024-06-19 RX ORDER — DIPHENHYDRAMINE HCL 25 MG
50 CAPSULE ORAL
Status: CANCELLED
Start: 2024-07-01

## 2024-06-19 RX ORDER — EPINEPHRINE 1 MG/ML
0.3 INJECTION, SOLUTION INTRAMUSCULAR; SUBCUTANEOUS EVERY 5 MIN PRN
Status: CANCELLED | OUTPATIENT
Start: 2024-06-20

## 2024-06-19 RX ORDER — HEPARIN SODIUM,PORCINE 10 UNIT/ML
5 VIAL (ML) INTRAVENOUS
Status: CANCELLED | OUTPATIENT
Start: 2024-07-01

## 2024-06-19 RX ORDER — HEPARIN SODIUM (PORCINE) LOCK FLUSH IV SOLN 100 UNIT/ML 100 UNIT/ML
5 SOLUTION INTRAVENOUS
Status: CANCELLED | OUTPATIENT
Start: 2024-07-01

## 2024-06-19 RX ORDER — KETOROLAC TROMETHAMINE 30 MG/ML
30 INJECTION, SOLUTION INTRAMUSCULAR; INTRAVENOUS ONCE
Status: CANCELLED
Start: 2024-07-01 | End: 2024-07-01

## 2024-06-19 RX ORDER — ALBUTEROL SULFATE 90 UG/1
1-2 AEROSOL, METERED RESPIRATORY (INHALATION)
Status: CANCELLED
Start: 2024-06-20

## 2024-06-19 RX ORDER — ONDANSETRON 2 MG/ML
8 INJECTION INTRAMUSCULAR; INTRAVENOUS EVERY 6 HOURS PRN
Status: CANCELLED
Start: 2024-07-01

## 2024-06-19 RX ORDER — MEPERIDINE HYDROCHLORIDE 25 MG/ML
25 INJECTION INTRAMUSCULAR; INTRAVENOUS; SUBCUTANEOUS EVERY 30 MIN PRN
Status: CANCELLED | OUTPATIENT
Start: 2024-06-20

## 2024-06-19 RX ORDER — HEPARIN SODIUM,PORCINE 10 UNIT/ML
5 VIAL (ML) INTRAVENOUS
Status: CANCELLED | OUTPATIENT
Start: 2024-06-20

## 2024-06-19 RX ORDER — ALBUTEROL SULFATE 0.83 MG/ML
2.5 SOLUTION RESPIRATORY (INHALATION)
Status: CANCELLED | OUTPATIENT
Start: 2024-06-20

## 2024-06-19 RX ORDER — HEPARIN SODIUM (PORCINE) LOCK FLUSH IV SOLN 100 UNIT/ML 100 UNIT/ML
5 SOLUTION INTRAVENOUS
Status: CANCELLED | OUTPATIENT
Start: 2024-06-20

## 2024-06-19 RX ORDER — ONDANSETRON 4 MG/1
8 TABLET, FILM COATED ORAL
Status: CANCELLED
Start: 2024-07-01

## 2024-06-19 RX ORDER — OXYCODONE HYDROCHLORIDE 10 MG/1
10 TABLET ORAL EVERY 4 HOURS PRN
Qty: 15 TABLET | Refills: 0 | Status: SHIPPED | OUTPATIENT
Start: 2024-06-24 | End: 2024-06-27

## 2024-06-19 RX ORDER — DIPHENHYDRAMINE HCL 25 MG
50 CAPSULE ORAL
Status: COMPLETED | OUTPATIENT
Start: 2024-06-19 | End: 2024-06-19

## 2024-06-19 RX ADMIN — SODIUM CHLORIDE, POTASSIUM CHLORIDE, SODIUM LACTATE AND CALCIUM CHLORIDE 1000 ML: 600; 310; 30; 20 INJECTION, SOLUTION INTRAVENOUS at 11:59

## 2024-06-19 RX ADMIN — HYDROMORPHONE HYDROCHLORIDE 1 MG: 1 INJECTION, SOLUTION INTRAMUSCULAR; INTRAVENOUS; SUBCUTANEOUS at 14:03

## 2024-06-19 RX ADMIN — ONDANSETRON 8 MG: 2 INJECTION INTRAMUSCULAR; INTRAVENOUS at 11:58

## 2024-06-19 RX ADMIN — DIPHENHYDRAMINE HYDROCHLORIDE 50 MG: 25 CAPSULE ORAL at 11:58

## 2024-06-19 RX ADMIN — KETOROLAC TROMETHAMINE 30 MG: 30 INJECTION, SOLUTION INTRAMUSCULAR; INTRAVENOUS at 11:58

## 2024-06-19 RX ADMIN — HYDROMORPHONE HYDROCHLORIDE 1 MG: 1 INJECTION, SOLUTION INTRAMUSCULAR; INTRAVENOUS; SUBCUTANEOUS at 11:58

## 2024-06-19 RX ADMIN — HYDROMORPHONE HYDROCHLORIDE 1 MG: 1 INJECTION, SOLUTION INTRAMUSCULAR; INTRAVENOUS; SUBCUTANEOUS at 13:03

## 2024-06-19 ASSESSMENT — PAIN SCALES - GENERAL
PAINLEVEL: SEVERE PAIN (7)
PAINLEVEL: SEVERE PAIN (7)

## 2024-06-19 NOTE — PROGRESS NOTES
Infusion Nursing Note:  Jennifer Cervantes presents today for IVF and pain meds.    Patient seen by provider today: Yes, VV with Daya Nascimento   present during visit today: Not Applicable.    Note: Jennifer here today with 7/10 pain, generalized. Pt received 1L LR bolus, 1mg x3 doses dilaudid, 30mg iv toradol, 8mg iv zofran, and 50mg PO benadryl. Tolerated infusion without difficulty, pain improved on discharge.      Intravenous Access:  Implanted Port.    Treatment Conditions:  Not Applicable.      Post Infusion Assessment:  Patient tolerated infusion without incident.  Blood return noted pre and post infusion.  Site patent and intact, free from redness, edema or discomfort.  No evidence of extravasations.  Access discontinued per protocol.       Discharge Plan:   Patient discharged in stable condition accompanied by: self.  Departure Mode: Ambulatory.      Allison Bowman RN

## 2024-06-19 NOTE — NURSING NOTE
Is the patient currently in the state of [place]? YES    Visit mode:VIDEO    If the visit is dropped, the patient can be reconnected by: VIDEO VISIT: Text to cell phone:   Telephone Information:   Mobile [phone]   Mobile [phone]       Will anyone else be joining the visit? NO  (If patient encounters technical issues they should call [phone]    How would you like to obtain your AVS? MyChart    Are changes needed to the allergy or medication list? Pt stated no changes to allergies and Pt stated no med changes    Are refills needed on medications prescribed by this physician? NO    Reason for visit: DAFNE DIAZ

## 2024-06-19 NOTE — Clinical Note
"6/19/2024      Jennifer Cervantes  [address]  [facility] [address]      Dear Colleague,    Thank you for referring your patient, Jennifer Cervantes, to the [facility]. Please see a copy of my visit note below.    Virtual Visit Details    Type of service:  Video Visit   Video Start Time: {video visit start/end time for provider to select:251912}  Video End Time:{video visit start/end time for provider to select:806754}    Originating Location (pt. Location): {video visit patient location:991678::\"Home\"}  {PROVIDER LOCATION On-site should be selected for visits conducted from your clinic location or adjoining [facility] hospital, academic office, or other nearby [facility] building. 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 She had multiple thromboembolic events in 2021 despite adherent anticoagulation use (though warfarin was perpetually low) and there are concerns for chronic thromboembolic disease but did not have pulmonary HTN on a November 2021 cath. She is maintained on chronic PO opioids and twice-weekly infusion visits (since 1/24/22) but has been able to be maintained on this regimen and has stayed out of the ED most of the time with even rarer admissions for most of 2023.     Interval History:  Jennifer is seen " for routine follow-up.     She was recently admitted 5/16 through 5/24 vaso-occlusive pain crisis.  Due to acute, severe abdominal pain she did have CT and ultrasound imaging while inpatient which was unremarkable.  Her abdominal symptoms have improved.  Overall she does still experience nausea and vomiting a few times per week.  This has been going on for many months.  She missed her last EGD due to not having a ride.    She has been struggling at home with her mom over the past few weeks.  This happens periodically.  Her mom acts as her PCA but Jennifer says she often declines to give her rides to appointments or do things just put her hair up.  She feels she has had increased pain as result of this stress.  She continues to treat her pain at home with oxycodone, Tylenol, ibuprofen, methocarbamol and hot baths.  She has been seen in the infusion center a few times this week although has not found this significantly helpful.  Most of her pain is focused in her pelvis at the moment.    She put in her 2-week notice at her current job.  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  She has an in person interview in just over a week.      Sickle Cell Disease Comprehensive Checklist  Bone Health/Avascular Necrosis Screening/Symptoms (each visit): no new concerns today  Leg Ulcer evaluation (every visit): no  Hypertension (every visit):stable 11/3/23  Last pulmonary evaluation (asthma, AMAN, pulm HTN): 9/28/22, due for follow-up  Stroke/silent cerebral infarct Hx (Y/N): Yes TIA ~2014, first event ~age 1 with full stroke and R sided weakness  Last PCP Visit: 3/6/23  Vaccines:  PCV13: 5/13/19  Pneumovax (PPSV23): 3/04, 10/09, 7/12/19 (next due 7/2024)  Menactra: 4/2010, 9/2015 (MCV done 8/16/21)  MenB: 9/16/15, 5/13/19  Influenza: 10/2/23  Audiology (chelation): done 2/27/24  Comparison to Previous Audiogram dated 6/6/2022:     Pure Tone Thresholds 250-8000 Hz:    RIGHT: stable     LEFT: stable     High Frequency Audiometry 9,000-16,000Hz:     RIGHT: stable    LEFT: stable     Word Recognition Score:    RIGHT: stable    LEFT: stable    Plan last reviewed with patient: 10/2/23    Patient background: 24 yo F, enjoys movies and kids though there are times where she does not really want to talk to people. Does not have a lot of social support at home.     Sickle Cell Disease History  Primary Hematologist Team: Jose Rafael Duncan  PCP: none  Genotype: SS  Acute Pain Crisis Treatment: (limiting IV)   ER   Dilaudid 1 mg IV q1h up to 3 doses  Toradol 30 mg IV x1   Maintenance IV fluids with LR  Other: Zofran 8 mg IV PRN nausea  Inpatient:  PCA Dilaudid 1 hr q30 minutes, no basal rate  Toradol 30 mg x6h x 4 hr  LR maintenance x 1-2 days  Other Medications: Zofran  ASA  Supportive Care: Docusate, Senna  Chronic Pain Medications:    Home regimen: oxycodone 15 mg p.o. q.4-6 hours p.r.n. breakthrough pain.  She also continues on Voltaren gel, and Zoloft among other medications.    -Also benefits from mental health visits, acupuncture  Baseline Hemoglobin: 7 g/dl without chronic transfusions  Hydroxyurea use: Yes  H/O blood transfusions: Yes, several (iron overload) Most recent 11/20/2021  H/O Transfusion Reactions: no  Antibodies:none  H/O Acute Chest Syndrome: Yes  Last episode:9/05/22 (previously 4/26/21, 10/2019)   ICU/intubation: not with 9/2022 admission  H/O Stroke: Yes (managed with chronic transfusions in the past, stopped late Spring 2020)  H/O VTE: Yes (2/2021)  H/O Cholecystectomy or Splenectomy: no  H/O Asthma, Pulm HTN, AVN, Leg Ulcers, Nephropathy, Retinopathy, etc: Iron overload, asthma, chronic lung disease, physical limitations from early stroke    ---------------------------------------  Jennifer Cervantes's Goals (reviewed today 5/24/24)    1-3 month goal:  Interview for a new job    6 month goal:  Find her own place after saving up money    12 month goal:      Disease-specific  goal(s):  Decrease frequency of ED visits. Decrease opioid qty by 10 tablets with next refill (June)      Current Outpatient Medications   Medication Sig Dispense Refill    acetaminophen (TYLENOL) 325 MG tablet Take 3 tablets (975 mg) by mouth every 8 hours 270 tablet 0    albuterol (PROAIR HFA/PROVENTIL HFA/VENTOLIN HFA) 108 (90 Base) MCG/ACT inhaler Inhale 2 puffs into the lungs every 6 hours as needed for shortness of breath or wheezing 8.5 g 3    albuterol (PROVENTIL) (2.5 MG/3ML) 0.083% neb solution Take 2 vials (5 mg) by nebulization every 6 hours as needed for shortness of breath or wheezing 90 mL 3    aspirin (ASA) 81 MG chewable tablet Take 1 tablet (81 mg) by mouth 2 times daily 60 tablet 11    budesonide-formoterol (SYMBICORT) 160-4.5 MCG/ACT Inhaler Inhale 2 puffs twice daily plus 1-2 puffs as needed. May use up to 12 puffs per day. 20.4 g 11    deferasirox (JADENU) 360 MG tablet Take 4 tablets (1,440 mg) by mouth every evening 120 tablet 4    Deferiprone, Twice Daily, 1000 MG TABS Take 2,000 mg by mouth 2 times daily Take 3000 mg by mouth every morning and 2000 mg every evening. 150 tablet 3    EPINEPHrine (ANY BX GENERIC EQUIV) 0.3 MG/0.3ML injection 2-pack Inject 0.3 mLs (0.3 mg) into the muscle as needed for anaphylaxis 1 each 1    Hydroxyurea 1000 MG TABS Take 3,000 mg by mouth daily 90 tablet 3    ibuprofen (ADVIL/MOTRIN) 600 MG tablet Take 600 mg by mouth every 6 hours as needed for moderate pain Using rarely, 2x/week at most      ibuprofen (ADVIL/MOTRIN) 800 MG tablet Take 800 mg by mouth every 8 hours as needed for moderate pain      melatonin 5 MG tablet Take 1 tablet (5 mg) by mouth nightly as needed for sleep 30 tablet 1    methocarbamol (ROBAXIN) 750 MG tablet Take 1 tablet (750 mg) by mouth 4 times daily as needed for muscle spasms (during sickle pain crises. Okay to take scheduled while in pain) 60 tablet 1    naloxone (NARCAN) 4 MG/0.1ML nasal spray Spray 1 spray (4 mg) into one nostril  alternating nostrils as needed for opioid reversal every 2-3 minutes until assistance arrives 0.2 mL 0    naloxone (NARCAN) 4 MG/0.1ML nasal spray Spray 4 mg into one nostril alternating nostrils as needed for opioid reversal every 2-3 minutes until assistance arrives      omeprazole (PRILOSEC) 20 MG DR capsule Take 1 capsule (20 mg) by mouth daily 30 capsule 0    ondansetron (ZOFRAN ODT) 8 MG ODT tab Take 1 tablet (8 mg) by mouth every 8 hours as needed for nausea 40 tablet 4    oxyCODONE IR (ROXICODONE) 10 MG tablet Take 1 tablet (10 mg) by mouth every 4 hours as needed for severe pain or breakthrough pain Goal 4 per day. Max 6 per day. 20 tablet 0       Past Medical History  Past Medical History:   Diagnosis Date    Anxiety     Bleeding disorder (H24)     Blood clotting disorder (H24)     Cerebral infarction (H) 2015    Cognitive developmental delay     low IQ. 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 side (H)     right hand contractures    Iron overload due to repeated red blood cell transfusions     Migraines     Multiple subsegmental pulmonary emboli without acute cor pulmonale (H) 02/01/2021    Oppositional defiant behavior     Presence of intrauterine contraceptive device 2/18/2020    Superficial venous thrombosis of arm, right 03/25/2021    Uncomplicated asthma      Past Surgical History:   Procedure Laterality Date    AS INSERT TUNNELED CV 2 CATH W/O PORT/PUMP      CHOLECYSTECTOMY      CV RIGHT HEART CATH MEASUREMENTS RECORDED N/A 11/18/2021    Procedure: Right Heart Cath;  Surgeon: Jackson Stauffer MD;  Location:  HEART CARDIAC CATH LAB    INSERT PORT VASCULAR ACCESS Left 4/21/2021    Procedure: INSERTION, VASCULAR ACCESS PORT (NOT SURE ON SIDE UNTIL REMOVAL);  Surgeon: Rajan More MD;  Location: [facility] OR    IR CHEST PORT PLACEMENT > 5 YRS OF AGE  4/21/2021    IR CVC NON  TUNNEL LINE REMOVAL  5/6/2021    IR CVC NON TUNNEL PLACEMENT > 5 YRS  04/07/2020    IR CVC NON TUNNEL PLACEMENT > 5 YRS  4/30/2021    IR CVC NON TUNNEL PLACEMENT > 5 YRS  9/7/2022    IR PORT REMOVAL LEFT  4/21/2021    REMOVE PORT VASCULAR ACCESS Left 4/21/2021    Procedure: REMOVAL, VASCULAR ACCESS PORT LEFT;  Surgeon: Rajan More MD;  Location: [facility] OR    REPAIR TENDON ELBOW Right 10/02/2019    Procedure: Right Elbow Flexor Lengthening, Flexor Pronator Slide Of Wrist and Finger, Thumb Adductor Lengthening;  Surgeon: Anai Franco MD;  Location: [facility] OR    TONSILLECTOMY Bilateral 10/02/2019    Procedure: Bilateral Tonsillectomy;  Surgeon: Farhana Guy MD;  Location: [facility] OR    ZZC BREAST REDUCTION (INCLUDES LIPO) TIER 3 Bilateral 04/23/2019     Allergies   Allergen Reactions    Contrast Dye      Hives and breathing issues    Fish-Derived Products Hives    Seafood Hives    Adhesive Tape Hives     Primipore dressing causes hives    Gadolinium     Iodinated Contrast Media      Social History   Social History     Tobacco Use    Smoking status: Never     Passive exposure: Never    Smokeless tobacco: Never   Substance Use Topics    Alcohol use: Not Currently     Alcohol/week: 0.0 standard drinks of alcohol    Drug use: Never   Her mom lives next door to her.  Past medical history and social history were reviewed.    Physical Examination:  LMP  (LMP Unknown)       Wt Readings from Last 10 Encounters:   06/09/24 70.3 kg (155 lb)   06/06/24 70.3 kg (155 lb)   06/01/24 70.3 kg (155 lb)   05/24/24 70.3 kg (154 lb 14.4 oz)   05/22/24 68 kg (150 lb)   05/03/24 67.9 kg (149 lb 12.8 oz)   04/30/24 67.4 kg (148 lb 9.4 oz)   04/19/24 69.3 kg (152 lb 12.8 oz)   04/18/24 68.5 kg (151 lb 0.2 oz)   04/11/24 70.2 kg (154 lb 12.8 oz)     General: Pleasant female, NAD  Eyes: EOMI, PERRL  Respiratory: Normal effort, no adventitious breath sounds  Ext: no peripheral edema  Neurologic: Grossly nonfocal. A/O x 4.  Skin:  No rashes, petechiae, or bruising noted on exposed skin.   Laboratory Data:  Most Recent 3 CBC's:  Recent Labs   Lab Test 06/16/24  0016 06/09/24  0107 06/06/24  1006   WBC 17.1* 10.3 9.2   HGB 7.4* 7.6* 7.5*   MCV 88 87 86   * 566* 582*   ANEUTAUTO 13.3* 6.5 6.1    Most Recent 3 BMP's:  Recent Labs   Lab Test 06/16/24  0016 06/09/24  0106 06/01/24 1949 05/24/24  0942 05/22/24  0124    139 138   < > 142   POTASSIUM 3.6 3.9 3.7   < > 3.8   CHLORIDE 108* 106 102   < > 106   CO2 22 22 25   < > 24   BUN 8.9 7.7 6.2   < > 6.4   CR 0.49* 0.51 0.49*   < > 0.59   ANIONGAP 12 11 11   < > 12   MICAH 8.8 8.9 9.2   < > 8.4*   * 90 93   < > 89   PROTTOTAL  --  7.4 7.9  --  7.0   ALBUMIN  --  4.6 4.7  --  4.4    < > = values in this interval not displayed.    Most Recent 2 LFT's:  Recent Labs   Lab Test 06/09/24 0106 06/01/24 1949   AST 39 40   ALT 31 28   ALKPHOS 63 66   BILITOTAL 2.4* 1.9*    Most Recent TSH and T4:  Recent Labs   Lab Test 04/23/24  1754   TSH 0.60     Phos/Mag:  Lab Results   Component Value Date    PHOS 4.8 (H) 05/13/2023    PHOS 5.0 (H) 05/12/2023    PHOS 3.6 02/21/2021    MAG 2.0 05/16/2024    MAG 1.7 04/29/2024    MAG 1.9 04/28/2024      I reviewed the above labs today.    Assessment and Plan:  1. Sickle Cell HgbSS Disease  2. Acute pain crises  3. Chronic Pain  4. 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 History of CVA  7. Hearing loss  8. Nausea/vomiting       Jennifer is seen today for routine follow-up and recent hospital follow-up after admission 5/16 through 5/20 for sickle cell pain crisis.  She acknowledges that she did need to go to the ED once this week and was seen in the infusion center 2 times for pain control.  She feels that her recent pain exacerbation is related to emotional stress regarding relationships with her mom and siblings.  She feels safe in her current housing situation but has a goal to save up enough money  to move out within the next few months.  She recently quit her job and is applied for a new job at the [employer] which she is very excited about.    Today we discussed pain control efforts and acknowledged that although her current stressors are contributing to physical pain they will not necessarily successfully be treated with additional opioids.  She is motivated to continue to limit her infusion center visits and even more so limit her ED visits if possible.  We decided not to adjust her oxycodone prescription today but she feels strongly that she would like to decrease her total tablet count by 5 to 10 tablets when I see her in about 3 weeks.    She remains on Jadenu but has been taking a break from additional chelation for a few months now.  Repeat Ferriscan was performed 5/3 showing and increase in LIC now at 31.8 compared to 28.5 in September 2023. We will likely add deferiprone to her chelation regimen but I will first confirm this with Dr. Duncan.     She missed her last EGD but continues to have persistent nausea and appetite changes as well as intermittent epigastric pain. I would still like her to pursue this to further evaluate for possible gastritis or ulcers. Currently this is rescheduled in November but we can inquire about an earlier appointment.     -------------------------------------  Plan:  -Continue Hydrea to 3000mg daily to help lessen frequency of sickle cell pain.   -Continue monthly crizanlizumab infusions.  -Continue slow taper of oxycodone. Currently receiving oxycodone 10 mg every 4 hours PRN, qty 20. Plan to tapering to qty 15 in about 3 weeks. I'll see her prior to this.  -She can self-reduce infusion days/week and we will continue to keep the cap at 2/week for now. This was recently revisited with her care team.   -Continue infusion center visits limited to two times per week (Mondays and Fridays ideally although still needs to call to request). Continue diligent home  "management with current medications, heat, rest, compression, warm baths. Methocarbamol was recently added which Jennifer is utilizing and finds helpful.  -If unable to manage at home can go to ED  with continued plan to use IV Dilaudid and take a break from ketamine (10/2/23). No PCA use and goal for any admission would still be to discharge by 5 days or less  -EGD for evaluation of ongoing n/v  - Continue Jadenu. Will consider deferiprone based on 5/3 Ferriscan results.  -Continue aspirin BID  -RTC in 3 weeks, coordinate with [facility] infusion                  Virtual Visit Details    Type of service:  Video Visit   Video Start Time: {video visit start/end time for provider to select:140731}  Video End Time:{video visit start/end time for provider to select:586597}    Originating Location (pt. Location): {video visit patient location:694985::\"Home\"}  {PROVIDER LOCATION On-site should be selected for visits conducted from your clinic location or adjoining [facility] hospital, academic office, or other nearby [facility] building. 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 She had multiple thromboembolic events in 2021 despite adherent anticoagulation use (though warfarin was perpetually low) and there are concerns for chronic thromboembolic disease " but did not have pulmonary HTN on a November 2021 cath. She is maintained on chronic PO opioids and twice-weekly infusion visits (since 1/24/22) but has been able to be maintained on this regimen and has stayed out of the ED most of the time with even rarer admissions for most of 2023.     Interval History:  Jennifer is seen for routine follow-up. She missed her visit with Patricia Mantilla last week. She is doing okay today. She has some generalized abdominal pain that she contributes to sickle cell pain. She has no challenges with taking her medications regularly. She has continued frequent nausea and vomiting. She last vomited Saturday into Sunday. She states that she went to the ER on 6/15 because as soon as she took her pills she threw them up and had sharp pains in her abdomen. She felt dehydrated and that she couldn't keep anything down. She would like to move her EDG up from November if able. She continues to treat her pain at home with oxycodone, Tylenol, ibuprofen, and methocarbamol. She had pelvic pain about one month ago but no current pain. She called in for IVF and IV pain meds today, her second infusion this week and knows if she goes today she cannot get more IVF or IV pain meds with sidney tomorrow.     Sickle Cell Disease Comprehensive Checklist  Bone Health/Avascular Necrosis Screening/Symptoms (each visit): no new concerns today  Leg Ulcer evaluation (every visit): no  Hypertension (every visit):stable 11/3/23  Last pulmonary evaluation (asthma, AMAN, pulm HTN): 9/28/22, due for follow-up  Stroke/silent cerebral infarct Hx (Y/N): Yes TIA ~2014, first event ~age 1 with full stroke and R sided weakness  Last PCP Visit: 3/6/23  Vaccines:  PCV13: 5/13/19  Pneumovax (PPSV23): 3/04, 10/09, 7/12/19 (next due 7/2024)  Menactra: 4/2010, 9/2015 (MCV done 8/16/21)  MenB: 9/16/15, 5/13/19  Influenza: 10/2/23  Audiology (chelation): done 2/27/24  Comparison to Previous Audiogram dated 6/6/2022:     Pure Tone Thresholds  250-8000 Hz:    RIGHT: stable    LEFT: stable     High Frequency Audiometry 9,000-16,000Hz:     RIGHT: stable    LEFT: stable     Word Recognition Score:    RIGHT: stable    LEFT: stable    Plan last reviewed with patient: 10/2/23    Patient background: 24 yo F, enjoys movies and kids though there are times where she does not really want to talk to people. Does not have a lot of social support at home.     Sickle Cell Disease History  Primary Hematologist Team: Jose Rafael Duncan  PCP: none  Genotype: SS  Acute Pain Crisis Treatment: (limiting IV)   ER   Dilaudid 1 mg IV q1h up to 3 doses  Toradol 30 mg IV x1   Maintenance IV fluids with LR  Other: Zofran 8 mg IV PRN nausea  Inpatient:  PCA Dilaudid 1 hr q30 minutes, no basal rate  Toradol 30 mg x6h x 4 hr  LR maintenance x 1-2 days  Other Medications: Zofran  ASA  Supportive Care: Docusate, Senna  Chronic Pain Medications:    Home regimen: oxycodone 15 mg p.o. q.4-6 hours p.r.n. breakthrough pain.  She also continues on Voltaren gel, and Zoloft among other medications.    -Also benefits from mental health visits, acupuncture  Baseline Hemoglobin: 7 g/dl without chronic transfusions  Hydroxyurea use: Yes  H/O blood transfusions: Yes, several (iron overload) Most recent 11/20/2021  H/O Transfusion Reactions: no  Antibodies:none  H/O Acute Chest Syndrome: Yes  Last episode:9/05/22 (previously 4/26/21, 10/2019)   ICU/intubation: not with 9/2022 admission  H/O Stroke: Yes (managed with chronic transfusions in the past, stopped late Spring 2020)  H/O VTE: Yes (2/2021)  H/O Cholecystectomy or Splenectomy: no  H/O Asthma, Pulm HTN, AVN, Leg Ulcers, Nephropathy, Retinopathy, etc: Iron overload, asthma, chronic lung disease, physical limitations from early stroke    ---------------------------------------  Jennifer Cervantes's Goals (las reviewed- 5/24/24)    1-3 month goal:  Interview for a new job-- continues to look for job on 6/19/24 visit.     6 month goal:  Find her own place  after saving up money    12 month goal:      Disease-specific goal(s):  Decrease frequency of ED visits. Decrease opioid qty by 10 tablets with next refill (June)      Current Outpatient Medications   Medication Sig Dispense Refill     acetaminophen (TYLENOL) 325 MG tablet Take 3 tablets (975 mg) by mouth every 8 hours 270 tablet 0     albuterol (PROAIR HFA/PROVENTIL HFA/VENTOLIN HFA) 108 (90 Base) MCG/ACT inhaler Inhale 2 puffs into the lungs every 6 hours as needed for shortness of breath or wheezing 8.5 g 3     albuterol (PROVENTIL) (2.5 MG/3ML) 0.083% neb solution Take 2 vials (5 mg) by nebulization every 6 hours as needed for shortness of breath or wheezing 90 mL 3     aspirin (ASA) 81 MG chewable tablet Take 1 tablet (81 mg) by mouth 2 times daily 60 tablet 11     budesonide-formoterol (SYMBICORT) 160-4.5 MCG/ACT Inhaler Inhale 2 puffs twice daily plus 1-2 puffs as needed. May use up to 12 puffs per day. 20.4 g 11     deferasirox (JADENU) 360 MG tablet Take 4 tablets (1,440 mg) by mouth every evening 120 tablet 4     Deferiprone, Twice Daily, 1000 MG TABS Take 2,000 mg by mouth 2 times daily Take 3000 mg by mouth every morning and 2000 mg every evening. 150 tablet 3     EPINEPHrine (ANY BX GENERIC EQUIV) 0.3 MG/0.3ML injection 2-pack Inject 0.3 mLs (0.3 mg) into the muscle as needed for anaphylaxis 1 each 1     Hydroxyurea 1000 MG TABS Take 3,000 mg by mouth daily 90 tablet 3     ibuprofen (ADVIL/MOTRIN) 600 MG tablet Take 600 mg by mouth every 6 hours as needed for moderate pain Using rarely, 2x/week at most       ibuprofen (ADVIL/MOTRIN) 800 MG tablet Take 800 mg by mouth every 8 hours as needed for moderate pain       melatonin 5 MG tablet Take 1 tablet (5 mg) by mouth nightly as needed for sleep 30 tablet 1     methocarbamol (ROBAXIN) 750 MG tablet Take 1 tablet (750 mg) by mouth 4 times daily as needed for muscle spasms (during sickle pain crises. Okay to take scheduled while in pain) 60 tablet 1      naloxone (NARCAN) 4 MG/0.1ML nasal spray Spray 1 spray (4 mg) into one nostril alternating nostrils as needed for opioid reversal every 2-3 minutes until assistance arrives 0.2 mL 0     naloxone (NARCAN) 4 MG/0.1ML nasal spray Spray 4 mg into one nostril alternating nostrils as needed for opioid reversal every 2-3 minutes until assistance arrives       omeprazole (PRILOSEC) 20 MG DR capsule Take 1 capsule (20 mg) by mouth daily 30 capsule 0     ondansetron (ZOFRAN ODT) 8 MG ODT tab Take 1 tablet (8 mg) by mouth every 8 hours as needed for nausea 40 tablet 4     oxyCODONE IR (ROXICODONE) 10 MG tablet Take 1 tablet (10 mg) by mouth every 4 hours as needed for severe pain or breakthrough pain Goal 4 per day. Max 6 per day. 20 tablet 0       Past Medical History  Past Medical History:   Diagnosis Date     Anxiety      Bleeding disorder (H24)      Blood clotting disorder (H24)      Cerebral infarction (H) 2015     Cognitive developmental delay     low IQ. Please recognize when managing pain and planning with her     Depressive disorder      Hemiplegia and hemiparesis following cerebral infarction affecting right dominant side (H)     right hand contractures     Iron overload due to repeated red blood cell transfusions      Migraines      Multiple subsegmental pulmonary emboli without acute cor pulmonale (H) 02/01/2021     Oppositional defiant behavior      Presence of intrauterine contraceptive device 2/18/2020     Superficial venous thrombosis of arm, right 03/25/2021     Uncomplicated asthma      Past Surgical History:   Procedure Laterality Date     AS INSERT TUNNELED CV 2 CATH W/O PORT/PUMP       CHOLECYSTECTOMY       CV RIGHT HEART CATH MEASUREMENTS RECORDED N/A 11/18/2021    Procedure: Right Heart Cath;  Surgeon: Jackson Stauffer MD;  Location:  [facility]     INSERT PORT VASCULAR ACCESS Left 4/21/2021    Procedure: INSERTION, VASCULAR ACCESS PORT (NOT SURE ON SIDE UNTIL REMOVAL);  Surgeon: Derik  MD Rajan;  Location: [facility] OR     IR CHEST PORT PLACEMENT > 5 YRS OF AGE  4/21/2021     IR CVC NON TUNNEL LINE REMOVAL  5/6/2021     IR CVC NON TUNNEL PLACEMENT > 5 YRS  04/07/2020     IR CVC NON TUNNEL PLACEMENT > 5 YRS  4/30/2021     IR CVC NON TUNNEL PLACEMENT > 5 YRS  9/7/2022     IR PORT REMOVAL LEFT  4/21/2021     REMOVE PORT VASCULAR ACCESS Left 4/21/2021    Procedure: REMOVAL, VASCULAR ACCESS PORT LEFT;  Surgeon: Rajan More MD;  Location: [facility] OR     REPAIR TENDON ELBOW Right 10/02/2019    Procedure: Right Elbow Flexor Lengthening, Flexor Pronator Slide Of Wrist and Finger, Thumb Adductor Lengthening;  Surgeon: Anai Franco MD;  [facility]: [facility]     TONSILLECTOMY Bilateral 10/02/2019    Procedure: Bilateral Tonsillectomy;  Surgeon: Farhana Guy MD;  Location: [facility]     ZZC BREAST REDUCTION (INCLUDES LIPO) TIER 3 Bilateral 04/23/2019     Allergies   Allergen Reactions     Contrast Dye      Hives and breathing issues     Fish-Derived Products Hives     Seafood Hives     Adhesive Tape Hives     Primipore dressing causes hives     Gadolinium      Iodinated Contrast Media      Social History   Social History     Tobacco Use     Smoking status: Never     Passive exposure: Never     Smokeless tobacco: Never   Substance Use Topics     Alcohol use: Not Currently     Alcohol/week: 0.0 standard drinks of alcohol     Drug use: Never   Her mom lives next door to her.  Past medical history and social history were reviewed.    Physical Examination:  Video physical exam  General: Patient appears well in no acute distress.   Skin: No visualized rash or lesions on visualized skin  Eyes: EOMI, no erythema, sclera icterus or discharge noted  Resp: Appears to be breathing comfortably without accessory muscle usage, speaking in full sentences, no cough  MSK: Appears to have normal range of motion based on visualized movements  Neurologic: No apparent tremors, facial movements symmetric  Psych: affect  bright, alert and oriented     Laboratory Data:  Most Recent 3 CBC's:  Recent Labs   Lab Test 06/16/24  0016 06/09/24  0107 06/06/24  1006   WBC 17.1* 10.3 9.2   HGB 7.4* 7.6* 7.5*   MCV 88 87 86   * 566* 582*   ANEUTAUTO 13.3* 6.5 6.1    Most Recent 3 BMP's:  Recent Labs   Lab Test 06/16/24  0016 06/09/24  0106 06/01/24  1949 05/24/24  0942 05/22/24  0124    139 138   < > 142   POTASSIUM 3.6 3.9 3.7   < > 3.8   CHLORIDE 108* 106 102   < > 106   CO2 22 22 25   < > 24   BUN 8.9 7.7 6.2   < > 6.4   CR 0.49* 0.51 0.49*   < > 0.59   ANIONGAP 12 11 11   < > 12   MICAH 8.8 8.9 9.2   < > 8.4*   * 90 93   < > 89   PROTTOTAL  --  7.4 7.9  --  7.0   ALBUMIN  --  4.6 4.7  --  4.4    < > = values in this interval not displayed.    Most Recent 2 LFT's:  Recent Labs   Lab Test 06/09/24 0106 06/01/24 1949   AST 39 40   ALT 31 28   ALKPHOS 63 66   BILITOTAL 2.4* 1.9*    Most Recent TSH and T4:  Recent Labs   Lab Test 04/23/24  1754   TSH 0.60     Phos/Mag:  Lab Results   Component Value Date    PHOS 4.8 (H) 05/13/2023    PHOS 5.0 (H) 05/12/2023    PHOS 3.6 02/21/2021    MAG 2.0 05/16/2024    MAG 1.7 04/29/2024    MAG 1.9 04/28/2024      I reviewed the above labs today.    Assessment and Plan:  1. Sickle Cell HgbSS Disease  2. Acute pain crises  3. Chronic Pain  4. 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 History of CVA  7. Hearing loss  8. Nausea/vomiting       Jennifer is seen today for routine follow-up. She acknowledges that she did need to go to the ED once earlier this past weekend. She was also in the ER twice between 6/6-6/9. She remains motivated to decrease her oral oxycodone from 20 tabs to 15 tabs with her next refill on Monday 6/24/24. She has needed twice weekly IVF/IV pain medications and frequent ED visits. Will need to continue to discuss trying to limit her infusion center visits and even more so limit her ED visits if possible. She would really  like to try 15 tabs instead of 20 tabs and I'm in support of this (discussed with Dr. Duncan who is also in agreement).     She remains on Jadenu and added in additional chelation with deferiprone on 5/30/24 as repeat Ferriscan was performed 5/3 showing and increase in LIC now at 31.8 compared to 28.5 in September 2023.    She missed her last EGD but continues to have persistent nausea and appetite changes as well as intermittent epigastric pain. I would still like her to pursue this to further evaluate for possible gastritis or ulcers. Currently this is rescheduled in November but we can inquire about an earlier appointment. Will attempt to move up again today 6/19/24.     -------------------------------------  Plan:  -Continue Hydrea to 3000mg daily to help lessen frequency of sickle cell pain.   -Continue monthly crizanlizumab infusions.  -Continue slow taper of oxycodone. Currently receiving oxycodone 10 mg every 4 hours PRN, qty 20. Plan to tapering to qty 15 on 6/24/24.   -She can self-reduce infusion days/week and we will continue to keep the cap at 2/week for now. This was recently revisited with her care team.   -Continue infusion center visits limited to two times per week (Mondays and Fridays ideally although still needs to call to request). Ok to get her second IVF/IV pain meds today 6/19/24.   - Continue diligent home management with current medications, heat, rest, compression, warm baths. Methocarbamol was recently added which Jennifer is utilizing and finds helpful.  -If unable to manage at home can go to ED  with continued plan to use IV Dilaudid and take a break from ketamine (10/2/23). No PCA use and goal for any admission would still be to discharge by 5 days or less  -EGD for evaluation of ongoing n/v  - Continue Jadenu and deferiprone.  -Continue aspirin BID  -RTC with Patricia Mantilla as scheduled in August.     50 minutes spent on the date of the encounter doing chart review, review of test  results, interpretation of tests, patient visit, and documentation      Daya Nascimento PA-C                      Again, thank you for allowing me to participate in the care of your patient.        Sincerely,        Daya Nascimento PA-C

## 2024-06-19 NOTE — PROGRESS NOTES
Social Work - Transportation  [facility]    Data/Intervention:  Patient Name: Jennifer Cervantes Goes By: Jennifer    /Age: 2000 (24 year old)    Referral From: [facility]  Reason for Referral:  support requested for patient transportation needs for today's appointment.  Assessment:  called [employer] to arrange ride through patient's insurance. [employer] arranged  for patient from home with [employer]. Patient will need to call [phone] when ready for return ride home.  Plan: Patient is aware of the transportation plan.  available to assist with any other needs.    CARLOS Chavez,UDAY  Hematology/Oncology Social Worker  Phone:[phone] Pager: [phone]

## 2024-06-19 NOTE — TELEPHONE ENCOUNTER
BMT can take at 11am     Call placed to Jennifer she can make it to the 11am appointment     Message sent to UDAY to arrange transportation     Message sent to [facility] to schedule.

## 2024-06-19 NOTE — PROGRESS NOTES
Virtual Visit Details    Type of service:  Video Visit   Video Start Time:  10:05 AM  Video End Time: 10:25 AM    Originating Location (pt. Location): Home  Distant Location (provider location):  Off-site  Platform used for Video Visit: Well    Adult Sickle Cell Outpatient Visit Note  Jun 19, 2024    Reason for Visit: routine follow-up for sickle cell disease, HgbSS    History of Present Illness: Jennifer Cervantes is a 24 year old female with HgbSS complicated by frequent pain crises (acute and chronic components), history of stroke leading to significant cognitive delays and right upper extremity hemiparesis, iron overload 2/2 chronic transfusions as secondary ppx post-CVA, anxiety/depression, asthma, She is currently on Hydrea and Jadenu. She had multiple thromboembolic events in 2021 despite adherent anticoagulation use (though warfarin was perpetually low) and there are concerns for chronic thromboembolic disease but did not have pulmonary HTN on a November 2021 cath. She is maintained on chronic PO opioids and twice-weekly infusion visits (since 1/24/22) but has been able to be maintained on this regimen and has stayed out of the ED most of the time with even rarer admissions for most of 2023.     Interval History:  Jennifer is seen for routine follow-up. She missed her visit with Patricia Mantilla last week. She is doing okay today. She has some generalized abdominal pain that she contributes to sickle cell pain. She has no challenges with taking her medications regularly. She has continued frequent nausea and vomiting. She last vomited Saturday into Sunday. She states that she went to the ER on 6/15 because as soon as she took her pills she threw them up and had sharp pains in her abdomen. She felt dehydrated and that she couldn't keep anything down. She would like to move her EDG up from November if able. She continues to treat her pain at home with oxycodone, Tylenol, ibuprofen, and methocarbamol. She had pelvic  pain about one month ago but no current pain. She called in for IVF and IV pain meds today, her second infusion this week and knows if she goes today she cannot get more IVF or IV pain meds with sidney tomorrow.     Sickle Cell Disease Comprehensive Checklist  Bone Health/Avascular Necrosis Screening/Symptoms (each visit): no new concerns today  Leg Ulcer evaluation (every visit): no  Hypertension (every visit):stable 11/3/23  Last pulmonary evaluation (asthma, AMAN, pulm HTN): 9/28/22, due for follow-up  Stroke/silent cerebral infarct Hx (Y/N): Yes TIA ~2014, first event ~age 1 with full stroke and R sided weakness  Last PCP Visit: 3/6/23  Vaccines:  PCV13: 5/13/19  Pneumovax (PPSV23): 3/04, 10/09, 7/12/19 (next due 7/2024)  Menactra: 4/2010, 9/2015 (MCV done 8/16/21)  MenB: 9/16/15, 5/13/19  Influenza: 10/2/23  Audiology (chelation): done 2/27/24  Comparison to Previous Audiogram dated 6/6/2022:     Pure Tone Thresholds 250-8000 Hz:    RIGHT: stable    LEFT: stable     High Frequency Audiometry 9,000-16,000Hz:     RIGHT: stable    LEFT: stable     Word Recognition Score:    RIGHT: stable    LEFT: stable    Plan last reviewed with patient: 10/2/23    Patient background: 24 yo F, enjoys movies and kids though there are times where she does not really want to talk to people. Does not have a lot of social support at home.     Sickle Cell Disease History  Primary Hematologist Team: Jose Rafael Duncan  PCP: none  Genotype: SS  Acute Pain Crisis Treatment: (limiting IV)   ER   Dilaudid 1 mg IV q1h up to 3 doses  Toradol 30 mg IV x1   Maintenance IV fluids with LR  Other: Zofran 8 mg IV PRN nausea  Inpatient:  PCA Dilaudid 1 hr q30 minutes, no basal rate  Toradol 30 mg x6h x 4 hr  LR maintenance x 1-2 days  Other Medications: Zofran  ASA  Supportive Care: Docusate, Senna  Chronic Pain Medications:    Home regimen: oxycodone 15 mg p.o. q.4-6 hours p.r.n. breakthrough pain.  She also continues on Voltaren gel, and Zoloft among  other medications.    -Also benefits from mental health visits, acupuncture  Baseline Hemoglobin: 7 g/dl without chronic transfusions  Hydroxyurea use: Yes  H/O blood transfusions: Yes, several (iron overload) Most recent 11/20/2021  H/O Transfusion Reactions: no  Antibodies:none  H/O Acute Chest Syndrome: Yes  Last episode:9/05/22 (previously 4/26/21, 10/2019)   ICU/intubation: not with 9/2022 admission  H/O Stroke: Yes (managed with chronic transfusions in the past, stopped late Spring 2020)  H/O VTE: Yes (2/2021)  H/O Cholecystectomy or Splenectomy: no  H/O Asthma, Pulm HTN, AVN, Leg Ulcers, Nephropathy, Retinopathy, etc: Iron overload, asthma, chronic lung disease, physical limitations from early stroke    ---------------------------------------  Jennifer Cervantes's Goals (las reviewed- 5/24/24)    1-3 month goal:  Interview for a new job-- continues to look for job on 6/19/24 visit.     6 month goal:  Find her own place after saving up money    12 month goal:      Disease-specific goal(s):  Decrease frequency of ED visits. Decrease opioid qty by 10 tablets with next refill (June)      Current Outpatient Medications   Medication Sig Dispense Refill    acetaminophen (TYLENOL) 325 MG tablet Take 3 tablets (975 mg) by mouth every 8 hours 270 tablet 0    albuterol (PROAIR HFA/PROVENTIL HFA/VENTOLIN HFA) 108 (90 Base) MCG/ACT inhaler Inhale 2 puffs into the lungs every 6 hours as needed for shortness of breath or wheezing 8.5 g 3    albuterol (PROVENTIL) (2.5 MG/3ML) 0.083% neb solution Take 2 vials (5 mg) by nebulization every 6 hours as needed for shortness of breath or wheezing 90 mL 3    aspirin (ASA) 81 MG chewable tablet Take 1 tablet (81 mg) by mouth 2 times daily 60 tablet 11    budesonide-formoterol (SYMBICORT) 160-4.5 MCG/ACT Inhaler Inhale 2 puffs twice daily plus 1-2 puffs as needed. May use up to 12 puffs per day. 20.4 g 11    deferasirox (JADENU) 360 MG tablet Take 4 tablets (1,440 mg) by mouth every  evening 120 tablet 4    Deferiprone, Twice Daily, 1000 MG TABS Take 2,000 mg by mouth 2 times daily Take 3000 mg by mouth every morning and 2000 mg every evening. 150 tablet 3    EPINEPHrine (ANY BX GENERIC EQUIV) 0.3 MG/0.3ML injection 2-pack Inject 0.3 mLs (0.3 mg) into the muscle as needed for anaphylaxis 1 each 1    Hydroxyurea 1000 MG TABS Take 3,000 mg by mouth daily 90 tablet 3    ibuprofen (ADVIL/MOTRIN) 600 MG tablet Take 600 mg by mouth every 6 hours as needed for moderate pain Using rarely, 2x/week at most      ibuprofen (ADVIL/MOTRIN) 800 MG tablet Take 800 mg by mouth every 8 hours as needed for moderate pain      melatonin 5 MG tablet Take 1 tablet (5 mg) by mouth nightly as needed for sleep 30 tablet 1    methocarbamol (ROBAXIN) 750 MG tablet Take 1 tablet (750 mg) by mouth 4 times daily as needed for muscle spasms (during sickle pain crises. Okay to take scheduled while in pain) 60 tablet 1    naloxone (NARCAN) 4 MG/0.1ML nasal spray Spray 1 spray (4 mg) into one nostril alternating nostrils as needed for opioid reversal every 2-3 minutes until assistance arrives 0.2 mL 0    naloxone (NARCAN) 4 MG/0.1ML nasal spray Spray 4 mg into one nostril alternating nostrils as needed for opioid reversal every 2-3 minutes until assistance arrives      omeprazole (PRILOSEC) 20 MG DR capsule Take 1 capsule (20 mg) by mouth daily 30 capsule 0    ondansetron (ZOFRAN ODT) 8 MG ODT tab Take 1 tablet (8 mg) by mouth every 8 hours as needed for nausea 40 tablet 4    oxyCODONE IR (ROXICODONE) 10 MG tablet Take 1 tablet (10 mg) by mouth every 4 hours as needed for severe pain or breakthrough pain Goal 4 per day. Max 6 per day. 20 tablet 0       Past Medical History  Past Medical History:   Diagnosis Date    Anxiety     Bleeding disorder (H24)     Blood clotting disorder (H24)     Cerebral infarction (H) 2015    Cognitive developmental delay     low IQ. Please recognize when managing pain and planning with her     Depressive disorder     Hemiplegia and hemiparesis following cerebral infarction affecting right dominant side (H)     right hand contractures    Iron overload due to repeated red blood cell transfusions     Migraines     Multiple subsegmental pulmonary emboli without acute cor pulmonale (H) 02/01/2021    Oppositional defiant behavior     Presence of intrauterine contraceptive device 2/18/2020    Superficial venous thrombosis of arm, right 03/25/2021    Uncomplicated asthma      Past Surgical History:   Procedure Laterality Date    AS INSERT TUNNELED CV 2 CATH W/O PORT/PUMP      CHOLECYSTECTOMY      CV RIGHT HEART CATH MEASUREMENTS RECORDED N/A 11/18/2021    Procedure: Right Heart Cath;  Surgeon: Jackson Stauffer MD;  Location:  [facility] C[facility] LAB    INSERT PORT VASCULAR ACCESS Left 4/21/2021    Procedure: INSERTION, VASCULAR ACCESS PORT (NOT SURE ON SIDE UNTIL REMOVAL);  Surgeon: Rajan More MD;  Location: [facility] OR    IR CHEST PORT PLACEMENT > 5 YRS OF AGE  4/21/2021    IR CVC NON TUNNEL LINE REMOVAL  5/6/2021    IR CVC NON TUNNEL PLACEMENT > 5 YRS  04/07/2020    IR CVC NON TUNNEL PLACEMENT > 5 YRS  4/30/2021    IR CVC NON TUNNEL PLACEMENT > 5 YRS  9/7/2022    IR PORT REMOVAL LEFT  4/21/2021    REMOVE PORT VASCULAR ACCESS Left 4/21/2021    Procedure: REMOVAL, VASCULAR ACCESS PORT LEFT;  Surgeon: Rajan More MD;  Location: [facility] OR    REPAIR TENDON ELBOW Right 10/02/2019    Procedure: Right Elbow Flexor Lengthening, Flexor Pronator Slide Of Wrist and Finger, Thumb Adductor Lengthening;  Surgeon: Anai Franco MD;  [facility]: [facility]    TONSILLECTOMY Bilateral 10/02/2019    Procedure: Bilateral Tonsillectomy;  Surgeon: Farhana Guy MD;  Location: [facility]    ZZC BREAST REDUCTION (INCLUDES LIPO) TIER 3 Bilateral 04/23/2019     Allergies   Allergen Reactions    Contrast Dye      Hives and breathing issues    Fish-Derived Products Hives    Seafood Hives    Adhesive Tape Hives     Primipore  dressing causes hives    Gadolinium     Iodinated Contrast Media      Social History   Social History     Tobacco Use    Smoking status: Never     Passive exposure: Never    Smokeless tobacco: Never   Substance Use Topics    Alcohol use: Not Currently     Alcohol/week: 0.0 standard drinks of alcohol    Drug use: Never   Her mom lives next door to her.  Past medical history and social history were reviewed.    Physical Examination:  Video physical exam  General: Patient appears well in no acute distress.   Skin: No visualized rash or lesions on visualized skin  Eyes: EOMI, no erythema, sclera icterus or discharge noted  Resp: Appears to be breathing comfortably without accessory muscle usage, speaking in full sentences, no cough  MSK: Appears to have normal range of motion based on visualized movements  Neurologic: No apparent tremors, facial movements symmetric  Psych: affect bright, alert and oriented     Laboratory Data:  Most Recent 3 CBC's:  Recent Labs   Lab Test 06/16/24  0016 06/09/24  0107 06/06/24  1006   WBC 17.1* 10.3 9.2   HGB 7.4* 7.6* 7.5*   MCV 88 87 86   * 566* 582*   ANEUTAUTO 13.3* 6.5 6.1    Most Recent 3 BMP's:  Recent Labs   Lab Test 06/16/24  0016 06/09/24  0106 06/01/24  1949 05/24/24  0942 05/22/24  0124    139 138   < > 142   POTASSIUM 3.6 3.9 3.7   < > 3.8   CHLORIDE 108* 106 102   < > 106   CO2 22 22 25   < > 24   BUN 8.9 7.7 6.2   < > 6.4   CR 0.49* 0.51 0.49*   < > 0.59   ANIONGAP 12 11 11   < > 12   MICAH 8.8 8.9 9.2   < > 8.4*   * 90 93   < > 89   PROTTOTAL  --  7.4 7.9  --  7.0   ALBUMIN  --  4.6 4.7  --  4.4    < > = values in this interval not displayed.    Most Recent 2 LFT's:  Recent Labs   Lab Test 06/09/24  0106 06/01/24 1949   AST 39 40   ALT 31 28   ALKPHOS 63 66   BILITOTAL 2.4* 1.9*    Most Recent TSH and T4:  Recent Labs   Lab Test 04/23/24  1754   TSH 0.60     Phos/Mag:  Lab Results   Component Value Date    PHOS 4.8 (H) 05/13/2023    PHOS 5.0 (H)  05/12/2023    PHOS 3.6 02/21/2021    MAG 2.0 05/16/2024    MAG 1.7 04/29/2024    MAG 1.9 04/28/2024      I reviewed the above labs today.    Assessment and Plan:  1. Sickle Cell HgbSS Disease  2. Acute pain crises  3. Chronic Pain  4. 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 History of CVA  7. Hearing loss  8. Nausea/vomiting       Jennifer is seen today for routine follow-up. She acknowledges that she did need to go to the ED once earlier this past weekend. She was also in the ER twice between 6/6-6/9. She remains motivated to decrease her oral oxycodone from 20 tabs to 15 tabs with her next refill on Monday 6/24/24. She has needed twice weekly IVF/IV pain medications and frequent ED visits. Will need to continue to discuss trying to limit her infusion center visits and even more so limit her ED visits if possible. She would really like to try 15 tabs instead of 20 tabs and I'm in support of this (discussed with Dr. Duncan who is also in agreement).     She remains on Jadenu and added in additional chelation with deferiprone on 5/30/24 as repeat Ferriscan was performed 5/3 showing and increase in LIC now at 31.8 compared to 28.5 in September 2023.    She missed her last EGD but continues to have persistent nausea and appetite changes as well as intermittent epigastric pain. I would still like her to pursue this to further evaluate for possible gastritis or ulcers. Currently this is rescheduled in November but we can inquire about an earlier appointment. Will attempt to move up again today 6/19/24.     -------------------------------------  Plan:  -Continue Hydrea to 3000mg daily to help lessen frequency of sickle cell pain.   -Continue monthly crizanlizumab infusions.  -Continue slow taper of oxycodone. Currently receiving oxycodone 10 mg every 4 hours PRN, qty 20. Plan to tapering to qty 15 on 6/24/24.   -She can self-reduce infusion days/week and we will  continue to keep the cap at 2/week for now. This was recently revisited with her care team.   -Continue infusion center visits limited to two times per week (Mondays and Fridays ideally although still needs to call to request). Ok to get her second IVF/IV pain meds today 6/19/24.   - Continue diligent home management with current medications, heat, rest, compression, warm baths. Methocarbamol was recently added which Jennifer is utilizing and finds helpful.  -If unable to manage at home can go to ED  with continued plan to use IV Dilaudid and take a break from ketamine (10/2/23). No PCA use and goal for any admission would still be to discharge by 5 days or less  -EGD for evaluation of ongoing n/v  - Continue Jadenu and deferiprone.  -Continue aspirin BID  -RTC with Patricia Mantilla as scheduled in August.     50 minutes spent on the date of the encounter doing chart review, review of test results, interpretation of tests, patient visit, and documentation      Daya Nascimento PA-C

## 2024-06-19 NOTE — TELEPHONE ENCOUNTER
Oncology Nurse Triage - Sickle Cell Pain Crisis:    Situation: Jennifer  calling about Sickle Cell Pain Crisis, requesting to be added on for IV fluids and pain medicine    Background:     Patient's last infusion was 6/17/24  Last clinic visit date:5/24/24 farhan Mantilla   Does patient have active treatment plan?  Yes      Assessment of Symptoms:  Onset/Duration of symptoms: 1 day    Is it typical sickle cell pain? Yes   Location: lower back   Character: Sharp           Intensity: 7/10    Any radiation of pain, numbness, tingling, weakness, warmth, swelling, discoloration of arms or legs?No     Fever?No  (if yes max temperature recorded in last 24 hours):      Chest Pain Present: No     Shortness of breath: No     Other home therapies tried: HEAT/HEATING PAD and WARM BATH     Last home medication taken and when: 6am oxycodone     Any Refills Needed?: No     Does patient have transportation & length of time to get to clinic: No will need help with transportation         Recommendations:     Placed on infusion call list     If you do not hear from the infusion center by 2pm then you will not be able to get in for an infusion today. If symptoms worsen while waiting for call back, and/or you experience fever, chills, SOB, chest pain, cough, n/v, dizziness, numbness, swelling, discoloration of extremities, then seek emergency evaluation in Emergency Department.     Please note, if you are late for your appt, you risk losing your infusion appt as it may delay another patient's infusion who arrived on time.

## 2024-06-20 ENCOUNTER — APPOINTMENT (OUTPATIENT)
Dept: LAB | Facility: CLINIC | Age: 24
End: 2024-06-20
Attending: PEDIATRICS
Payer: COMMERCIAL

## 2024-06-20 ENCOUNTER — INFUSION THERAPY VISIT (OUTPATIENT)
Dept: ONCOLOGY | Facility: CLINIC | Age: 24
End: 2024-06-20
Attending: PEDIATRICS
Payer: COMMERCIAL

## 2024-06-20 VITALS
SYSTOLIC BLOOD PRESSURE: 127 MMHG | OXYGEN SATURATION: 96 % | DIASTOLIC BLOOD PRESSURE: 79 MMHG | WEIGHT: 157.7 LBS | TEMPERATURE: 98.4 F | RESPIRATION RATE: 18 BRPM | BODY MASS INDEX: 27.07 KG/M2 | HEART RATE: 89 BPM

## 2024-06-20 DIAGNOSIS — D57.00 SICKLE CELL PAIN CRISIS (H): Primary | ICD-10-CM

## 2024-06-20 LAB
ANION GAP SERPL CALCULATED.3IONS-SCNC: 10 MMOL/L (ref 7–15)
BASOPHILS # BLD AUTO: 0.2 10E3/UL (ref 0–0.2)
BASOPHILS NFR BLD AUTO: 2 %
BUN SERPL-MCNC: 8.1 MG/DL (ref 6–20)
CALCIUM SERPL-MCNC: 8.7 MG/DL (ref 8.6–10)
CHLORIDE SERPL-SCNC: 106 MMOL/L (ref 98–107)
CREAT SERPL-MCNC: 0.55 MG/DL (ref 0.51–0.95)
DEPRECATED HCO3 PLAS-SCNC: 24 MMOL/L (ref 22–29)
EGFRCR SERPLBLD CKD-EPI 2021: >90 ML/MIN/1.73M2
EOSINOPHIL # BLD AUTO: 0.4 10E3/UL (ref 0–0.7)
EOSINOPHIL NFR BLD AUTO: 5 %
ERYTHROCYTE [DISTWIDTH] IN BLOOD BY AUTOMATED COUNT: 23.3 % (ref 10–15)
GLUCOSE SERPL-MCNC: 91 MG/DL (ref 70–99)
HCT VFR BLD AUTO: 22.4 % (ref 35–47)
HGB BLD-MCNC: 7.6 G/DL (ref 11.7–15.7)
IMM GRANULOCYTES # BLD: 0 10E3/UL
IMM GRANULOCYTES NFR BLD: 0 %
LYMPHOCYTES # BLD AUTO: 1.8 10E3/UL (ref 0.8–5.3)
LYMPHOCYTES NFR BLD AUTO: 19 %
MCH RBC QN AUTO: 30.3 PG (ref 26.5–33)
MCHC RBC AUTO-ENTMCNC: 33.9 G/DL (ref 31.5–36.5)
MCV RBC AUTO: 89 FL (ref 78–100)
MONOCYTES # BLD AUTO: 0.8 10E3/UL (ref 0–1.3)
MONOCYTES NFR BLD AUTO: 8 %
NEUTROPHILS # BLD AUTO: 6.5 10E3/UL (ref 1.6–8.3)
NEUTROPHILS NFR BLD AUTO: 66 %
NRBC # BLD AUTO: 0.2 10E3/UL
NRBC BLD AUTO-RTO: 3 /100
PLATELET # BLD AUTO: 381 10E3/UL (ref 150–450)
POTASSIUM SERPL-SCNC: 3.9 MMOL/L (ref 3.4–5.3)
RBC # BLD AUTO: 2.51 10E6/UL (ref 3.8–5.2)
RETICS # AUTO: 0.47 10E6/UL (ref 0.03–0.1)
RETICS/RBC NFR AUTO: 19.2 % (ref 0.5–2)
SODIUM SERPL-SCNC: 140 MMOL/L (ref 135–145)
WBC # BLD AUTO: 9.7 10E3/UL (ref 4–11)

## 2024-06-20 PROCEDURE — 82565 ASSAY OF CREATININE: CPT | Performed by: PEDIATRICS

## 2024-06-20 PROCEDURE — 85004 AUTOMATED DIFF WBC COUNT: CPT | Performed by: PEDIATRICS

## 2024-06-20 PROCEDURE — 250N000011 HC RX IP 250 OP 636: Performed by: PEDIATRICS

## 2024-06-20 PROCEDURE — 96365 THER/PROPH/DIAG IV INF INIT: CPT

## 2024-06-20 PROCEDURE — 85045 AUTOMATED RETICULOCYTE COUNT: CPT | Performed by: PEDIATRICS

## 2024-06-20 PROCEDURE — 82374 ASSAY BLOOD CARBON DIOXIDE: CPT | Performed by: PEDIATRICS

## 2024-06-20 PROCEDURE — 96413 CHEMO IV INFUSION 1 HR: CPT

## 2024-06-20 PROCEDURE — 258N000003 HC RX IP 258 OP 636: Mod: JZ | Performed by: PEDIATRICS

## 2024-06-20 PROCEDURE — 36591 DRAW BLOOD OFF VENOUS DEVICE: CPT | Performed by: PEDIATRICS

## 2024-06-20 RX ORDER — HEPARIN SODIUM (PORCINE) LOCK FLUSH IV SOLN 100 UNIT/ML 100 UNIT/ML
5 SOLUTION INTRAVENOUS ONCE
Status: COMPLETED | OUTPATIENT
Start: 2024-06-20 | End: 2024-06-20

## 2024-06-20 RX ORDER — HEPARIN SODIUM (PORCINE) LOCK FLUSH IV SOLN 100 UNIT/ML 100 UNIT/ML
5 SOLUTION INTRAVENOUS
Status: DISCONTINUED | OUTPATIENT
Start: 2024-06-20 | End: 2024-06-20 | Stop reason: HOSPADM

## 2024-06-20 RX ADMIN — HEPARIN 5 ML: 100 SYRINGE at 09:35

## 2024-06-20 RX ADMIN — Medication 5 ML: at 11:51

## 2024-06-20 RX ADMIN — SODIUM CHLORIDE 250 ML: 9 INJECTION, SOLUTION INTRAVENOUS at 10:14

## 2024-06-20 RX ADMIN — SODIUM CHLORIDE 357.5 MG: 9 INJECTION, SOLUTION INTRAVENOUS at 10:16

## 2024-06-20 ASSESSMENT — PAIN SCALES - GENERAL: PAINLEVEL: MILD PAIN (3)

## 2024-06-20 NOTE — PROGRESS NOTES
Infusion Nursing Note:  Jennifer Cervantes presents today for Crizanlizumab.    Patient seen by provider today: farhan Connor NP 6/19/24   present during visit today: Not Applicable.    Note: Patient reports no changes overnight. No new concern. No intervention for pain today.       Intravenous Access:  Implanted Port.    Treatment Conditions:  Not Applicable.      Post Infusion Assessment:  Patient tolerated infusion without incident.  Observation for 30 minutes post Crizanlizumab.  Blood return noted pre and post infusion.  Site patent and intact, free from redness, edema or discomfort.  No evidence of extravasations.  Access discontinued per protocol.       Discharge Plan:   Patient declined prescription refills.  Discharge instructions reviewed with: Patient.  Patient and/or family verbalized understanding of discharge instructions and all questions answered.  AVS to patient via [employer].  Patient will return 7/17 for next appointment.   Patient discharged in stable condition accompanied by: self.  Departure Mode: Ambulatory.      GLORIA YANCEY RN

## 2024-06-20 NOTE — NURSING NOTE
"Chief Complaint   Patient presents with    Port Draw     Labs drawn via port by RN. Port accessed with 20g 3/4\" power needle and vitals WNL. Flushed with saline and heparin. Pt tolerated well. Patient checked into next appointment.       Dee Dee Peters RN    "

## 2024-06-21 ENCOUNTER — NURSE TRIAGE (OUTPATIENT)
Dept: ONCOLOGY | Facility: CLINIC | Age: 24
End: 2024-06-21

## 2024-06-21 ENCOUNTER — INFUSION THERAPY VISIT (OUTPATIENT)
Dept: TRANSPLANT | Facility: CLINIC | Age: 24
End: 2024-06-21
Attending: PEDIATRICS
Payer: COMMERCIAL

## 2024-06-21 ENCOUNTER — PATIENT OUTREACH (OUTPATIENT)
Dept: CARE COORDINATION | Facility: CLINIC | Age: 24
End: 2024-06-21

## 2024-06-21 VITALS
DIASTOLIC BLOOD PRESSURE: 82 MMHG | TEMPERATURE: 98.2 F | OXYGEN SATURATION: 95 % | HEART RATE: 95 BPM | RESPIRATION RATE: 16 BRPM | SYSTOLIC BLOOD PRESSURE: 132 MMHG

## 2024-06-21 DIAGNOSIS — D57.00 SICKLE CELL PAIN CRISIS (H): Primary | ICD-10-CM

## 2024-06-21 DIAGNOSIS — G81.10 SPASTIC HEMIPLEGIA, UNSPECIFIED ETIOLOGY, UNSPECIFIED LATERALITY (H): ICD-10-CM

## 2024-06-21 PROCEDURE — 250N000011 HC RX IP 250 OP 636: Mod: JZ | Performed by: PEDIATRICS

## 2024-06-21 PROCEDURE — 96361 HYDRATE IV INFUSION ADD-ON: CPT

## 2024-06-21 PROCEDURE — 96376 TX/PRO/DX INJ SAME DRUG ADON: CPT

## 2024-06-21 PROCEDURE — 96374 THER/PROPH/DIAG INJ IV PUSH: CPT

## 2024-06-21 PROCEDURE — 258N000003 HC RX IP 258 OP 636: Mod: JZ | Performed by: PEDIATRICS

## 2024-06-21 PROCEDURE — 96375 TX/PRO/DX INJ NEW DRUG ADDON: CPT

## 2024-06-21 PROCEDURE — 250N000013 HC RX MED GY IP 250 OP 250 PS 637: Performed by: PEDIATRICS

## 2024-06-21 RX ORDER — KETOROLAC TROMETHAMINE 30 MG/ML
30 INJECTION, SOLUTION INTRAMUSCULAR; INTRAVENOUS ONCE
Status: CANCELLED
Start: 2024-07-01 | End: 2024-07-01

## 2024-06-21 RX ORDER — DIPHENHYDRAMINE HCL 25 MG
50 CAPSULE ORAL
Status: CANCELLED
Start: 2024-07-01

## 2024-06-21 RX ORDER — HEPARIN SODIUM (PORCINE) LOCK FLUSH IV SOLN 100 UNIT/ML 100 UNIT/ML
5 SOLUTION INTRAVENOUS ONCE
Status: COMPLETED | OUTPATIENT
Start: 2024-06-21 | End: 2024-06-21

## 2024-06-21 RX ORDER — KETOROLAC TROMETHAMINE 30 MG/ML
30 INJECTION, SOLUTION INTRAMUSCULAR; INTRAVENOUS ONCE
Status: COMPLETED | OUTPATIENT
Start: 2024-06-21 | End: 2024-06-21

## 2024-06-21 RX ORDER — HEPARIN SODIUM,PORCINE 10 UNIT/ML
5 VIAL (ML) INTRAVENOUS
Status: CANCELLED | OUTPATIENT
Start: 2024-07-01

## 2024-06-21 RX ORDER — ONDANSETRON 4 MG/1
8 TABLET, FILM COATED ORAL
Status: CANCELLED
Start: 2024-07-01

## 2024-06-21 RX ORDER — DIPHENHYDRAMINE HCL 25 MG
50 CAPSULE ORAL
Status: COMPLETED | OUTPATIENT
Start: 2024-06-21 | End: 2024-06-21

## 2024-06-21 RX ORDER — ONDANSETRON 2 MG/ML
8 INJECTION INTRAMUSCULAR; INTRAVENOUS EVERY 6 HOURS PRN
Status: DISCONTINUED | OUTPATIENT
Start: 2024-06-21 | End: 2024-06-21 | Stop reason: HOSPADM

## 2024-06-21 RX ORDER — ONDANSETRON 2 MG/ML
8 INJECTION INTRAMUSCULAR; INTRAVENOUS EVERY 6 HOURS PRN
Status: CANCELLED
Start: 2024-07-01

## 2024-06-21 RX ORDER — HEPARIN SODIUM (PORCINE) LOCK FLUSH IV SOLN 100 UNIT/ML 100 UNIT/ML
5 SOLUTION INTRAVENOUS
Status: CANCELLED | OUTPATIENT
Start: 2024-07-01

## 2024-06-21 RX ADMIN — KETOROLAC TROMETHAMINE 30 MG: 30 INJECTION, SOLUTION INTRAMUSCULAR; INTRAVENOUS at 12:33

## 2024-06-21 RX ADMIN — ONDANSETRON 8 MG: 2 INJECTION INTRAMUSCULAR; INTRAVENOUS at 12:33

## 2024-06-21 RX ADMIN — Medication 5 ML: at 14:44

## 2024-06-21 RX ADMIN — HYDROMORPHONE HYDROCHLORIDE 1 MG: 1 INJECTION, SOLUTION INTRAMUSCULAR; INTRAVENOUS; SUBCUTANEOUS at 12:30

## 2024-06-21 RX ADMIN — HYDROMORPHONE HYDROCHLORIDE 1 MG: 1 INJECTION, SOLUTION INTRAMUSCULAR; INTRAVENOUS; SUBCUTANEOUS at 13:30

## 2024-06-21 RX ADMIN — SODIUM CHLORIDE, POTASSIUM CHLORIDE, SODIUM LACTATE AND CALCIUM CHLORIDE 1000 ML: 600; 310; 30; 20 INJECTION, SOLUTION INTRAVENOUS at 12:30

## 2024-06-21 RX ADMIN — HYDROMORPHONE HYDROCHLORIDE 1 MG: 1 INJECTION, SOLUTION INTRAMUSCULAR; INTRAVENOUS; SUBCUTANEOUS at 14:36

## 2024-06-21 RX ADMIN — DIPHENHYDRAMINE HYDROCHLORIDE 50 MG: 25 CAPSULE ORAL at 12:32

## 2024-06-21 ASSESSMENT — PAIN SCALES - GENERAL: PAINLEVEL: SEVERE PAIN (7)

## 2024-06-21 NOTE — TELEPHONE ENCOUNTER
Available time with BMT. Call to pt to verify she is able to come in for infusion before 1300. Pt confirmed she is available to come in before 1300. Message sent to SW to assist with transportation and CCOD to assist with scheduling.

## 2024-06-21 NOTE — TELEPHONE ENCOUNTER
Oncology Nurse Triage - Pain    Situation: Jennifer reporting the following symptoms: Pain    Background:   Patient's last infusion was 06/19/24 and 06/17/24  Date of last office visit: 06/19/24 w/Daya Nascimento  Does patient have active treatment plan?  Yes    Assessment:     Pain started after her Jr infusion yesterday. Pt is constant.   Is it typical sickle cell pain? Yes   Location: Back  Character: Sharp           Intensity: 7/10    Any radiation of pain, numbness, tingling, weakness, warmth, swelling, discoloration of arms or legs?No     Fever?No  (if yes max temperature recorded in last 24 hours):      Chest Pain Present: No     Shortness of breath: No     Other home therapies tried: HEAT/HEATING PAD and WARM BATH     Last home medication taken and when: 0800 oxycodone, ibuprofen 0830 Robaxin    Does patient have transportation & length of time to get to clinic: No, is 15 min from clinic    Pt states she is wondering what else she can do since she is taking all of her medications as prescribed and trying home remedies and pain continue to get worse.       Recommendations:   [employer] message to     0952  approving adding pt on to wait list for IVF/pain meds today.    0953 Call to pt to inform her of provider recommendation. Reviewed with pt if symptoms worsen while waiting for call back, and/or you experience fever, chills, SOB, chest pain, cough, n/v, dizziness, numbness, swelling, discoloration of extremities, then seek emergency evaluation in Emergency Department. Pt verbalized understanding.     0937 Pt added to wait list

## 2024-06-21 NOTE — PROGRESS NOTES
Ambulatory Care Management- Transportation Support  Oncology Clinic     Data/Intervention:  Patient Name: Jennifer Cervantes    /Age: 2000 (24 year old)     CCRC team member collaborated with following Oncology SW regarding this request: From Nurse Triage for transportation      Assessment:  CHW/CTA called [facility] to arrange medical appointment transportation in conjunction with patient's insurance. [facility] arranged  for patient from home with [employer].  Patient will need to call when ready for return ride home [phone] for a pickup change for 11:15am from a 12:30pm pickup that was already arranged prior to me calling [facility].    Plan:  Patient is aware of the transportation plan.  available to assist with any other needs.        Isaura OTTO Community Health Worker  Clinic Care Coordination  [facility]  Phone: [phone]

## 2024-06-21 NOTE — PROGRESS NOTES
Infusion Nursing Note:  Jennifer Cervantes presents today for add-on infusion.    Patient seen by provider today: No   present during visit today: Not Applicable.    Note: Patient received dilaudid x3, 1 L LR, zofran IV, tramadol, and benadryl per therapy plan for 7/10 low back pain with some relief. Patient had some hives noted on her L thigh- patient was unsure if they developed while she was in infusion or at home; hives resolved post benadryl administration (patient was already going to be taking benadryl per her therapy plan).      Intravenous Access:  Implanted Port.    Treatment Conditions:  Results reviewed, labs MET treatment parameters, ok to proceed with treatment.      Post Infusion Assessment:  Patient tolerated infusion without incident.       Discharge Plan:   Patient and/or family verbalized understanding of discharge instructions and all questions answered.      Vicenta Boone RN

## 2024-06-24 ENCOUNTER — INFUSION THERAPY VISIT (OUTPATIENT)
Dept: TRANSPLANT | Facility: CLINIC | Age: 24
End: 2024-06-24
Attending: PEDIATRICS
Payer: COMMERCIAL

## 2024-06-24 ENCOUNTER — NURSE TRIAGE (OUTPATIENT)
Dept: ONCOLOGY | Facility: CLINIC | Age: 24
End: 2024-06-24
Payer: COMMERCIAL

## 2024-06-24 VITALS
SYSTOLIC BLOOD PRESSURE: 120 MMHG | RESPIRATION RATE: 20 BRPM | TEMPERATURE: 98.4 F | OXYGEN SATURATION: 96 % | HEART RATE: 102 BPM | DIASTOLIC BLOOD PRESSURE: 79 MMHG

## 2024-06-24 DIAGNOSIS — D57.00 SICKLE CELL PAIN CRISIS (H): Primary | ICD-10-CM

## 2024-06-24 DIAGNOSIS — G81.10 SPASTIC HEMIPLEGIA, UNSPECIFIED ETIOLOGY, UNSPECIFIED LATERALITY (H): ICD-10-CM

## 2024-06-24 DIAGNOSIS — D57.00 SICKLE CELL PAIN CRISIS (H): ICD-10-CM

## 2024-06-24 LAB
ALBUMIN SERPL BCG-MCNC: 4.9 G/DL (ref 3.5–5.2)
ALP SERPL-CCNC: 61 U/L (ref 40–150)
ALT SERPL W P-5'-P-CCNC: 20 U/L (ref 0–50)
ANION GAP SERPL CALCULATED.3IONS-SCNC: 12 MMOL/L (ref 7–15)
AST SERPL W P-5'-P-CCNC: 53 U/L (ref 0–45)
BASOPHILS # BLD AUTO: 0.2 10E3/UL (ref 0–0.2)
BASOPHILS NFR BLD AUTO: 2 %
BILIRUB SERPL-MCNC: 2.8 MG/DL
BUN SERPL-MCNC: 8.8 MG/DL (ref 6–20)
CALCIUM SERPL-MCNC: 9.3 MG/DL (ref 8.6–10)
CHLORIDE SERPL-SCNC: 105 MMOL/L (ref 98–107)
CREAT SERPL-MCNC: 0.47 MG/DL (ref 0.51–0.95)
DEPRECATED HCO3 PLAS-SCNC: 21 MMOL/L (ref 22–29)
EGFRCR SERPLBLD CKD-EPI 2021: >90 ML/MIN/1.73M2
EOSINOPHIL # BLD AUTO: 0.6 10E3/UL (ref 0–0.7)
EOSINOPHIL NFR BLD AUTO: 5 %
ERYTHROCYTE [DISTWIDTH] IN BLOOD BY AUTOMATED COUNT: 20.8 % (ref 10–15)
GLUCOSE SERPL-MCNC: 91 MG/DL (ref 70–99)
HCT VFR BLD AUTO: 22.1 % (ref 35–47)
HGB BLD-MCNC: 7.6 G/DL (ref 11.7–15.7)
IMM GRANULOCYTES # BLD: 0.1 10E3/UL
IMM GRANULOCYTES NFR BLD: 0 %
LYMPHOCYTES # BLD AUTO: 1.4 10E3/UL (ref 0.8–5.3)
LYMPHOCYTES NFR BLD AUTO: 11 %
MCH RBC QN AUTO: 28.9 PG (ref 26.5–33)
MCHC RBC AUTO-ENTMCNC: 34.4 G/DL (ref 31.5–36.5)
MCV RBC AUTO: 84 FL (ref 78–100)
MONOCYTES # BLD AUTO: 0.8 10E3/UL (ref 0–1.3)
MONOCYTES NFR BLD AUTO: 7 %
NEUTROPHILS # BLD AUTO: 9.5 10E3/UL (ref 1.6–8.3)
NEUTROPHILS NFR BLD AUTO: 75 %
NRBC # BLD AUTO: 0.1 10E3/UL
NRBC BLD AUTO-RTO: 1 /100
PLATELET # BLD AUTO: 394 10E3/UL (ref 150–450)
POTASSIUM SERPL-SCNC: 3.8 MMOL/L (ref 3.4–5.3)
PROT SERPL-MCNC: 8.2 G/DL (ref 6.4–8.3)
RBC # BLD AUTO: 2.63 10E6/UL (ref 3.8–5.2)
RETICS # AUTO: 0.41 10E6/UL (ref 0.03–0.1)
RETICS/RBC NFR AUTO: 15.4 % (ref 0.5–2)
SODIUM SERPL-SCNC: 138 MMOL/L (ref 135–145)
WBC # BLD AUTO: 12.6 10E3/UL (ref 4–11)

## 2024-06-24 PROCEDURE — 258N000003 HC RX IP 258 OP 636: Mod: JZ | Performed by: PEDIATRICS

## 2024-06-24 PROCEDURE — 96376 TX/PRO/DX INJ SAME DRUG ADON: CPT

## 2024-06-24 PROCEDURE — 250N000011 HC RX IP 250 OP 636: Mod: JZ | Performed by: PEDIATRICS

## 2024-06-24 PROCEDURE — 96375 TX/PRO/DX INJ NEW DRUG ADDON: CPT

## 2024-06-24 PROCEDURE — 85045 AUTOMATED RETICULOCYTE COUNT: CPT | Performed by: REGISTERED NURSE

## 2024-06-24 PROCEDURE — 82247 BILIRUBIN TOTAL: CPT | Performed by: REGISTERED NURSE

## 2024-06-24 PROCEDURE — 96361 HYDRATE IV INFUSION ADD-ON: CPT

## 2024-06-24 PROCEDURE — 82374 ASSAY BLOOD CARBON DIOXIDE: CPT | Performed by: REGISTERED NURSE

## 2024-06-24 PROCEDURE — 36591 DRAW BLOOD OFF VENOUS DEVICE: CPT | Performed by: REGISTERED NURSE

## 2024-06-24 PROCEDURE — 96374 THER/PROPH/DIAG INJ IV PUSH: CPT

## 2024-06-24 PROCEDURE — 250N000013 HC RX MED GY IP 250 OP 250 PS 637: Performed by: PEDIATRICS

## 2024-06-24 PROCEDURE — 85025 COMPLETE CBC W/AUTO DIFF WBC: CPT | Performed by: REGISTERED NURSE

## 2024-06-24 RX ORDER — OXYCODONE HYDROCHLORIDE 10 MG/1
10 TABLET ORAL EVERY 4 HOURS PRN
Qty: 15 TABLET | Refills: 0 | Status: CANCELLED | OUTPATIENT
Start: 2024-06-24

## 2024-06-24 RX ORDER — HEPARIN SODIUM,PORCINE 10 UNIT/ML
5 VIAL (ML) INTRAVENOUS
Status: CANCELLED | OUTPATIENT
Start: 2024-07-01

## 2024-06-24 RX ORDER — DIPHENHYDRAMINE HCL 25 MG
50 CAPSULE ORAL
Status: CANCELLED
Start: 2024-07-01

## 2024-06-24 RX ORDER — HEPARIN SODIUM (PORCINE) LOCK FLUSH IV SOLN 100 UNIT/ML 100 UNIT/ML
5 SOLUTION INTRAVENOUS
Status: CANCELLED | OUTPATIENT
Start: 2024-07-01

## 2024-06-24 RX ORDER — ONDANSETRON 4 MG/1
8 TABLET, FILM COATED ORAL
Status: CANCELLED
Start: 2024-07-01

## 2024-06-24 RX ORDER — KETOROLAC TROMETHAMINE 30 MG/ML
30 INJECTION, SOLUTION INTRAMUSCULAR; INTRAVENOUS ONCE
Status: CANCELLED
Start: 2024-07-01 | End: 2024-07-01

## 2024-06-24 RX ORDER — DIPHENHYDRAMINE HCL 25 MG
50 CAPSULE ORAL
Status: COMPLETED | OUTPATIENT
Start: 2024-06-24 | End: 2024-06-24

## 2024-06-24 RX ORDER — ONDANSETRON 2 MG/ML
8 INJECTION INTRAMUSCULAR; INTRAVENOUS EVERY 6 HOURS PRN
Status: DISCONTINUED | OUTPATIENT
Start: 2024-06-24 | End: 2024-06-24 | Stop reason: HOSPADM

## 2024-06-24 RX ORDER — ONDANSETRON 2 MG/ML
8 INJECTION INTRAMUSCULAR; INTRAVENOUS EVERY 6 HOURS PRN
Status: CANCELLED
Start: 2024-07-01

## 2024-06-24 RX ORDER — KETOROLAC TROMETHAMINE 30 MG/ML
30 INJECTION, SOLUTION INTRAMUSCULAR; INTRAVENOUS ONCE
Status: COMPLETED | OUTPATIENT
Start: 2024-06-24 | End: 2024-06-24

## 2024-06-24 RX ADMIN — SODIUM CHLORIDE, POTASSIUM CHLORIDE, SODIUM LACTATE AND CALCIUM CHLORIDE 1000 ML: 600; 310; 30; 20 INJECTION, SOLUTION INTRAVENOUS at 09:59

## 2024-06-24 RX ADMIN — HYDROMORPHONE HYDROCHLORIDE 1 MG: 1 INJECTION, SOLUTION INTRAMUSCULAR; INTRAVENOUS; SUBCUTANEOUS at 10:00

## 2024-06-24 RX ADMIN — DIPHENHYDRAMINE HYDROCHLORIDE 50 MG: 25 CAPSULE ORAL at 10:01

## 2024-06-24 RX ADMIN — HYDROMORPHONE HYDROCHLORIDE 1 MG: 1 INJECTION, SOLUTION INTRAMUSCULAR; INTRAVENOUS; SUBCUTANEOUS at 12:01

## 2024-06-24 RX ADMIN — KETOROLAC TROMETHAMINE 30 MG: 30 INJECTION, SOLUTION INTRAMUSCULAR; INTRAVENOUS at 10:00

## 2024-06-24 RX ADMIN — ONDANSETRON 8 MG: 2 INJECTION INTRAMUSCULAR; INTRAVENOUS at 10:00

## 2024-06-24 RX ADMIN — HYDROMORPHONE HYDROCHLORIDE 1 MG: 1 INJECTION, SOLUTION INTRAMUSCULAR; INTRAVENOUS; SUBCUTANEOUS at 11:00

## 2024-06-24 ASSESSMENT — PAIN SCALES - GENERAL: PAINLEVEL: EXTREME PAIN (8)

## 2024-06-24 NOTE — TELEPHONE ENCOUNTER
Call to pharmacy to verify they have prescription sent on 06/19. Pharmacy tech verifying they do have prescription and will be filling it today.

## 2024-06-24 NOTE — TELEPHONE ENCOUNTER
Narcotic Refill Request    Medication(s) requested:  Oxycodone  Person Requesting Refill: Patient  What pain is the medication treating: Sickle Cell pain  How is the medication being taken?:1 tablet every 6 hrs, 3-4 max but will take 6 if she needs to  Does pt have enough for today? Will run out today  Is pain being adequately controlled on the current regimen?: Yes  Experiencing any side effects from medication?: no    Date of most recent appointment:  06/19/24 w/Daay Nascimento  Any No Show Visits: No  Next appointment:   08/15/24 w/Patricia Mantilla  Last fill date and by whom:  06/19/24 by Daya Nascimento   Reviewed: No Access    Routed/Paged provider: Daya Nascimento

## 2024-06-24 NOTE — PROGRESS NOTES
"Infusion Nursing Note:  Jennifer Cervantes presents today for IVF and pain meds.    Patient seen by provider today: No   present during visit today: Not Applicable.    Note: Jennifer here today feeling poorly, she feels like she is \"getting sick.\" She says she has a \"tickle\" in her chest and throat and a mild cough. No fevers or chills. Patricia Mantilla was contacted and labs were drawn at her request. No additional changes, pt to monitor for worsening symptoms. Pt received 1L NS bolus, 50mg PO benadryl, 8mg IV zofran, 30mg IV toradol, and 1mg x 3 doses IV dilaudid.      Intravenous Access:  Implanted Port.    Treatment Conditions:  Lab Results   Component Value Date    HGB 7.6 (L) 06/24/2024    WBC 12.6 (H) 06/24/2024    ANEU 9.0 (H) 03/17/2024    ANEUTAUTO 9.5 (H) 06/24/2024     06/24/2024        Lab Results   Component Value Date     06/24/2024    POTASSIUM 3.8 06/24/2024    MAG 2.0 05/16/2024    CR 0.47 (L) 06/24/2024    MICAH 9.3 06/24/2024    BILITOTAL 2.8 (H) 06/24/2024    ALBUMIN 4.9 06/24/2024    ALT 20 06/24/2024    AST 53 (H) 06/24/2024         Post Infusion Assessment:  Patient tolerated infusion without incident.  Blood return noted pre and post infusion.  Site patent and intact, free from redness, edema or discomfort.  No evidence of extravasations.  Access discontinued per protocol.       Discharge Plan:   Patient discharged in stable condition accompanied by: self.  Departure Mode: Ambulatory.      Allison Bowman RN    "

## 2024-06-24 NOTE — TELEPHONE ENCOUNTER
"Oncology Nurse Triage - Sickle Cell Pain Crisis:    Situation: Jennifer  calling about Sickle Cell Pain Crisis, requesting to be added on for IV fluids and pain medicine    Background:     Patient's last infusion was 06/21/24  Last clinic visit date:06/19/24  Does patient have active treatment plan?  Yes      Assessment of Symptoms:  Onset/Duration of symptoms: 2 day    Is it typical sickle cell pain? No   Location: \"generalized\"  Character: Dull ache           Intensity: 8/10    Any radiation of pain, numbness, tingling, weakness, warmth, swelling, discoloration of arms or legs?No     Fever?No  (if yes max temperature recorded in last 24 hours):      Chest Pain Present: No     Shortness of breath: No     Other home therapies tried: HEAT/HEATING PAD and WARM BATH     Last home medication taken and when: 0600 oxycodone, ibuprofen, robaxin    Any Refills Needed?: Yes, oxycodone    Does patient have transportation & length of time to get to clinic: No, 15-20 min from clinic        Recommendations:   If you do not hear from the infusion center by 2pm then you will not be able to get in for an infusion today. If symptoms worsen while waiting for call back, and/or you experience fever, chills, SOB, chest pain, cough, n/v, dizziness, numbness, swelling, discoloration of extremities, then seek emergency evaluation in Emergency Department.     Please note, if you are late for your appt, you risk losing your infusion appt as it may delay another patient's infusion who arrived on time.     [id number] [employer] message to  for approval    [id number]  approving adding to wait list for IVF/Pain meds            "

## 2024-06-24 NOTE — TELEPHONE ENCOUNTER
Available appt with BMT between 0830 and 1000. Pt stating she is able to get to clinic by 1000. She reports she has her own transportation to clinic and back home.   Message sent to CCOD to add pt to schedule.

## 2024-06-25 ENCOUNTER — HOSPITAL ENCOUNTER (EMERGENCY)
Facility: CLINIC | Age: 24
Discharge: HOME | End: 2024-06-25
Attending: EMERGENCY MEDICINE | Admitting: EMERGENCY MEDICINE
Payer: COMMERCIAL

## 2024-06-25 ENCOUNTER — APPOINTMENT (OUTPATIENT)
Dept: GENERAL RADIOLOGY | Facility: CLINIC | Age: 24
End: 2024-06-25
Attending: EMERGENCY MEDICINE
Payer: COMMERCIAL

## 2024-06-25 VITALS
HEIGHT: 64 IN | SYSTOLIC BLOOD PRESSURE: 133 MMHG | TEMPERATURE: 97.9 F | OXYGEN SATURATION: 99 % | HEART RATE: 106 BPM | DIASTOLIC BLOOD PRESSURE: 79 MMHG | RESPIRATION RATE: 16 BRPM | WEIGHT: 151.6 LBS | BODY MASS INDEX: 25.88 KG/M2

## 2024-06-25 DIAGNOSIS — D57.1 HB-SS DISEASE WITHOUT CRISIS (H): ICD-10-CM

## 2024-06-25 DIAGNOSIS — D57.00 SICKLE CELL PAIN CRISIS (H): ICD-10-CM

## 2024-06-25 LAB
ALBUMIN SERPL BCG-MCNC: 4.8 G/DL (ref 3.5–5.2)
ALP SERPL-CCNC: 65 U/L (ref 40–150)
ALT SERPL W P-5'-P-CCNC: 31 U/L (ref 0–50)
ANION GAP SERPL CALCULATED.3IONS-SCNC: 10 MMOL/L (ref 7–15)
AST SERPL W P-5'-P-CCNC: 57 U/L (ref 0–45)
BASOPHILS # BLD AUTO: ABNORMAL 10*3/UL
BASOPHILS # BLD MANUAL: 0 10E3/UL (ref 0–0.2)
BASOPHILS NFR BLD AUTO: ABNORMAL %
BASOPHILS NFR BLD MANUAL: 0 %
BILIRUB SERPL-MCNC: 3.1 MG/DL
BUN SERPL-MCNC: 7.2 MG/DL (ref 6–20)
CALCIUM SERPL-MCNC: 9 MG/DL (ref 8.6–10)
CHLORIDE SERPL-SCNC: 103 MMOL/L (ref 98–107)
CREAT SERPL-MCNC: 0.5 MG/DL (ref 0.51–0.95)
DEPRECATED HCO3 PLAS-SCNC: 24 MMOL/L (ref 22–29)
EGFRCR SERPLBLD CKD-EPI 2021: >90 ML/MIN/1.73M2
EOSINOPHIL # BLD AUTO: ABNORMAL 10*3/UL
EOSINOPHIL # BLD MANUAL: 1.1 10E3/UL (ref 0–0.7)
EOSINOPHIL NFR BLD AUTO: ABNORMAL %
EOSINOPHIL NFR BLD MANUAL: 9 %
ERYTHROCYTE [DISTWIDTH] IN BLOOD BY AUTOMATED COUNT: 21.3 % (ref 10–15)
FLUAV RNA SPEC QL NAA+PROBE: NEGATIVE
FLUBV RNA RESP QL NAA+PROBE: NEGATIVE
GLUCOSE SERPL-MCNC: 86 MG/DL (ref 70–99)
HCG INTACT+B SERPL-ACNC: <1 MIU/ML
HCT VFR BLD AUTO: 20.1 % (ref 35–47)
HGB BLD-MCNC: 7.2 G/DL (ref 11.7–15.7)
HOLD SPECIMEN: NORMAL
HOLD SPECIMEN: NORMAL
IMM GRANULOCYTES # BLD: ABNORMAL 10*3/UL
IMM GRANULOCYTES NFR BLD: ABNORMAL %
LIPASE SERPL-CCNC: 22 U/L (ref 13–60)
LYMPHOCYTES # BLD AUTO: ABNORMAL 10*3/UL
LYMPHOCYTES # BLD MANUAL: 1.6 10E3/UL (ref 0.8–5.3)
LYMPHOCYTES NFR BLD AUTO: ABNORMAL %
LYMPHOCYTES NFR BLD MANUAL: 13 %
MCH RBC QN AUTO: 29.6 PG (ref 26.5–33)
MCHC RBC AUTO-ENTMCNC: 35.8 G/DL (ref 31.5–36.5)
MCV RBC AUTO: 83 FL (ref 78–100)
MONOCYTES # BLD AUTO: ABNORMAL 10*3/UL
MONOCYTES # BLD MANUAL: 0.8 10E3/UL (ref 0–1.3)
MONOCYTES NFR BLD AUTO: ABNORMAL %
MONOCYTES NFR BLD MANUAL: 7 %
NEUTROPHILS # BLD AUTO: ABNORMAL 10*3/UL
NEUTROPHILS # BLD MANUAL: 8.6 10E3/UL (ref 1.6–8.3)
NEUTROPHILS NFR BLD AUTO: ABNORMAL %
NEUTROPHILS NFR BLD MANUAL: 71 %
NRBC # BLD AUTO: 0.1 10E3/UL
NRBC BLD MANUAL-RTO: 1 %
NT-PROBNP SERPL-MCNC: 44 PG/ML (ref 0–450)
PLAT MORPH BLD: ABNORMAL
PLATELET # BLD AUTO: 396 10E3/UL (ref 150–450)
POTASSIUM SERPL-SCNC: 4.4 MMOL/L (ref 3.4–5.3)
PROT SERPL-MCNC: 7.9 G/DL (ref 6.4–8.3)
RBC # BLD AUTO: 2.43 10E6/UL (ref 3.8–5.2)
RBC MORPH BLD: ABNORMAL
RETICS # AUTO: NORMAL 10*3/UL
RETICS/RBC NFR AUTO: NORMAL %
RSV RNA SPEC NAA+PROBE: NEGATIVE
SARS-COV-2 RNA RESP QL NAA+PROBE: NEGATIVE
SICKLE CELLS BLD QL SMEAR: ABNORMAL
SODIUM SERPL-SCNC: 137 MMOL/L (ref 135–145)
TARGETS BLD QL SMEAR: ABNORMAL
TROPONIN T SERPL HS-MCNC: <6 NG/L
WBC # BLD AUTO: 12.1 10E3/UL (ref 4–11)

## 2024-06-25 PROCEDURE — 258N000003 HC RX IP 258 OP 636: Performed by: EMERGENCY MEDICINE

## 2024-06-25 PROCEDURE — 250N000013 HC RX MED GY IP 250 OP 250 PS 637: Performed by: EMERGENCY MEDICINE

## 2024-06-25 PROCEDURE — 71046 X-RAY EXAM CHEST 2 VIEWS: CPT

## 2024-06-25 PROCEDURE — 36415 COLL VENOUS BLD VENIPUNCTURE: CPT | Performed by: EMERGENCY MEDICINE

## 2024-06-25 PROCEDURE — 96375 TX/PRO/DX INJ NEW DRUG ADDON: CPT | Performed by: EMERGENCY MEDICINE

## 2024-06-25 PROCEDURE — 83880 ASSAY OF NATRIURETIC PEPTIDE: CPT | Performed by: EMERGENCY MEDICINE

## 2024-06-25 PROCEDURE — 87637 SARSCOV2&INF A&B&RSV AMP PRB: CPT | Performed by: EMERGENCY MEDICINE

## 2024-06-25 PROCEDURE — 96376 TX/PRO/DX INJ SAME DRUG ADON: CPT | Performed by: EMERGENCY MEDICINE

## 2024-06-25 PROCEDURE — 85045 AUTOMATED RETICULOCYTE COUNT: CPT | Performed by: EMERGENCY MEDICINE

## 2024-06-25 PROCEDURE — 99284 EMERGENCY DEPT VISIT MOD MDM: CPT | Performed by: EMERGENCY MEDICINE

## 2024-06-25 PROCEDURE — 99285 EMERGENCY DEPT VISIT HI MDM: CPT | Mod: 25 | Performed by: EMERGENCY MEDICINE

## 2024-06-25 PROCEDURE — 80053 COMPREHEN METABOLIC PANEL: CPT | Performed by: EMERGENCY MEDICINE

## 2024-06-25 PROCEDURE — 96374 THER/PROPH/DIAG INJ IV PUSH: CPT | Performed by: EMERGENCY MEDICINE

## 2024-06-25 PROCEDURE — 84702 CHORIONIC GONADOTROPIN TEST: CPT | Performed by: EMERGENCY MEDICINE

## 2024-06-25 PROCEDURE — 96361 HYDRATE IV INFUSION ADD-ON: CPT | Performed by: EMERGENCY MEDICINE

## 2024-06-25 PROCEDURE — 85007 BL SMEAR W/DIFF WBC COUNT: CPT | Performed by: EMERGENCY MEDICINE

## 2024-06-25 PROCEDURE — 83690 ASSAY OF LIPASE: CPT | Performed by: EMERGENCY MEDICINE

## 2024-06-25 PROCEDURE — 250N000011 HC RX IP 250 OP 636: Mod: JZ | Performed by: EMERGENCY MEDICINE

## 2024-06-25 PROCEDURE — 93010 ELECTROCARDIOGRAM REPORT: CPT | Performed by: EMERGENCY MEDICINE

## 2024-06-25 PROCEDURE — 85027 COMPLETE CBC AUTOMATED: CPT | Performed by: EMERGENCY MEDICINE

## 2024-06-25 PROCEDURE — 93005 ELECTROCARDIOGRAM TRACING: CPT | Performed by: EMERGENCY MEDICINE

## 2024-06-25 PROCEDURE — 84484 ASSAY OF TROPONIN QUANT: CPT | Performed by: EMERGENCY MEDICINE

## 2024-06-25 RX ORDER — KETOROLAC TROMETHAMINE 30 MG/ML
30 INJECTION, SOLUTION INTRAMUSCULAR; INTRAVENOUS ONCE
Status: COMPLETED | OUTPATIENT
Start: 2024-06-25 | End: 2024-06-25

## 2024-06-25 RX ORDER — HEPARIN SODIUM (PORCINE) LOCK FLUSH IV SOLN 100 UNIT/ML 100 UNIT/ML
100 SOLUTION INTRAVENOUS ONCE
Status: COMPLETED | OUTPATIENT
Start: 2024-06-25 | End: 2024-06-25

## 2024-06-25 RX ORDER — SODIUM CHLORIDE, SODIUM LACTATE, POTASSIUM CHLORIDE, CALCIUM CHLORIDE 600; 310; 30; 20 MG/100ML; MG/100ML; MG/100ML; MG/100ML
INJECTION, SOLUTION INTRAVENOUS CONTINUOUS
Status: DISCONTINUED | OUTPATIENT
Start: 2024-06-25 | End: 2024-06-26 | Stop reason: HOSPADM

## 2024-06-25 RX ORDER — ACETAMINOPHEN 325 MG/1
650 TABLET ORAL ONCE
Status: COMPLETED | OUTPATIENT
Start: 2024-06-25 | End: 2024-06-25

## 2024-06-25 RX ADMIN — HEPARIN 100 UNITS: 100 SYRINGE at 22:36

## 2024-06-25 RX ADMIN — HYDROMORPHONE HYDROCHLORIDE 1 MG: 1 INJECTION, SOLUTION INTRAMUSCULAR; INTRAVENOUS; SUBCUTANEOUS at 19:01

## 2024-06-25 RX ADMIN — HYDROMORPHONE HYDROCHLORIDE 1 MG: 1 INJECTION, SOLUTION INTRAMUSCULAR; INTRAVENOUS; SUBCUTANEOUS at 21:24

## 2024-06-25 RX ADMIN — KETOROLAC TROMETHAMINE 30 MG: 30 INJECTION, SOLUTION INTRAMUSCULAR at 17:55

## 2024-06-25 RX ADMIN — HYDROMORPHONE HYDROCHLORIDE 1 MG: 1 INJECTION, SOLUTION INTRAMUSCULAR; INTRAVENOUS; SUBCUTANEOUS at 17:55

## 2024-06-25 RX ADMIN — SODIUM CHLORIDE, POTASSIUM CHLORIDE, SODIUM LACTATE AND CALCIUM CHLORIDE: 600; 310; 30; 20 INJECTION, SOLUTION INTRAVENOUS at 17:55

## 2024-06-25 RX ADMIN — ACETAMINOPHEN 650 MG: 325 TABLET, FILM COATED ORAL at 21:24

## 2024-06-25 RX ADMIN — HYDROMORPHONE HYDROCHLORIDE 1 MG: 1 INJECTION, SOLUTION INTRAMUSCULAR; INTRAVENOUS; SUBCUTANEOUS at 20:32

## 2024-06-25 ASSESSMENT — ACTIVITIES OF DAILY LIVING (ADL)
ADLS_ACUITY_SCORE: 37
ADLS_ACUITY_SCORE: 37
ADLS_ACUITY_SCORE: 35
ADLS_ACUITY_SCORE: 37

## 2024-06-25 NOTE — ED PROVIDER NOTES
"       [facility] EMERGENCY DEPARTMENT ([facility])    6/25/24      ED PROVIDER NOTE       History     Chief Complaint   Patient presents with    Sickle Cell Pain Crisis     \"Body feels achy and having abdominal pain.\" Upper right abdominal pain.     HPI  Jennifer Cervantes is a 24 year old female with a history of sickle cell anemia with frequent pain crises, iron overload due to chronic transfusions, deep vein thrombosis, asthma, gastritis, cerebral infarction, and right upper extremity hemiparesis who presents to the ED generalized body aches and abdominal pain related to sickle cell pain crisis.  Patient states that she has been having pain for the past few days.  She went to infusion center yesterday and felt better after IV fluids and pain medications.  This morning, she again awoke with generalized pain.  She is also developed a cough that has been nonproductive.  She complains of a constant pressure in her chest.  She denies any pleuritic chest pain.  She denies any hemoptysis.  She does state that she had a fever to 102 today but took Tylenol and has subsequently not had any recurrent fever.  She also complains of right upper quadrant abdominal pain without associated nausea or vomiting.  She denies any diarrhea or constipation.  She has had that pain before and has had CT, ultrasound, and MRIs performed.  Her MRI revealed iron overload.  CT and ultrasound were largely unremarkable.    Past Medical History  Past Medical History:   Diagnosis Date    Anxiety     Bleeding disorder (H24)     Blood clotting disorder (H24)     Cerebral infarction (H) 2015    Cognitive developmental delay     low IQ. 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 side (H)     right hand contractures    Iron overload due to repeated red blood cell transfusions     Migraines     Multiple subsegmental pulmonary " emboli without acute cor pulmonale (H) 02/01/2021    Oppositional defiant behavior     Presence of intrauterine contraceptive device 2/18/2020    Superficial venous thrombosis of arm, right 03/25/2021    Uncomplicated asthma      Past Surgical History:   Procedure Laterality Date    AS INSERT TUNNELED CV 2 CATH W/O PORT/PUMP      CHOLECYSTECTOMY      CV RIGHT HEART CATH MEASUREMENTS RECORDED N/A 11/18/2021    Procedure: Right Heart Cath;  Surgeon: Jackson Stauffer MD;  Location:  HEART CARDIAC CATH LAB    INSERT PORT VASCULAR ACCESS Left 4/21/2021    Procedure: INSERTION, VASCULAR ACCESS PORT (NOT SURE ON SIDE UNTIL REMOVAL);  Surgeon: Rajan More MD;  Location: [facility] OR    IR CHEST PORT PLACEMENT > 5 YRS OF AGE  4/21/2021    IR CVC NON TUNNEL LINE REMOVAL  5/6/2021    IR CVC NON TUNNEL PLACEMENT > 5 YRS  04/07/2020    IR CVC NON TUNNEL PLACEMENT > 5 YRS  4/30/2021    IR CVC NON TUNNEL PLACEMENT > 5 YRS  9/7/2022    IR PORT REMOVAL LEFT  4/21/2021    REMOVE PORT VASCULAR ACCESS Left 4/21/2021    Procedure: REMOVAL, VASCULAR ACCESS PORT LEFT;  Surgeon: Rajan More MD;  Location: [facility] OR    REPAIR TENDON ELBOW Right 10/02/2019    Procedure: Right Elbow Flexor Lengthening, Flexor Pronator Slide Of Wrist and Finger, Thumb Adductor Lengthening;  Surgeon: Anai Franco MD;  Location: [facility] OR    TONSILLECTOMY Bilateral 10/02/2019    Procedure: Bilateral Tonsillectomy;  Surgeon: Farhana Guy MD;  Location: [facility] OR    ZZC BREAST REDUCTION (INCLUDES LIPO) TIER 3 Bilateral 04/23/2019     acetaminophen (TYLENOL) 325 MG tablet  albuterol (PROAIR HFA/PROVENTIL HFA/VENTOLIN HFA) 108 (90 Base) MCG/ACT inhaler  albuterol (PROVENTIL) (2.5 MG/3ML) 0.083% neb solution  aspirin (ASA) 81 MG chewable tablet  budesonide-formoterol (SYMBICORT) 160-4.5 MCG/ACT Inhaler  deferasirox (JADENU) 360 MG tablet  Deferiprone, Twice Daily, 1000 MG TABS  EPINEPHrine (ANY BX GENERIC EQUIV) 0.3 MG/0.3ML injection  "2-pack  Hydroxyurea 1000 MG TABS  ibuprofen (ADVIL/MOTRIN) 600 MG tablet  ibuprofen (ADVIL/MOTRIN) 800 MG tablet  melatonin 5 MG tablet  methocarbamol (ROBAXIN) 750 MG tablet  naloxone (NARCAN) 4 MG/0.1ML nasal spray  naloxone (NARCAN) 4 MG/0.1ML nasal spray  omeprazole (PRILOSEC) 20 MG DR capsule  ondansetron (ZOFRAN ODT) 8 MG ODT tab  oxyCODONE IR (ROXICODONE) 10 MG tablet      Allergies   Allergen Reactions    Contrast Dye      Hives and breathing issues    Fish-Derived Products Hives    Seafood Hives    Adhesive Tape Hives     Primipore dressing causes hives    Gadolinium     Iodinated Contrast Media      Family History  Family History   Problem Relation Age of Onset    Sickle Cell Trait Mother     Hypertension Mother     Asthma Mother     Sickle Cell Trait Father     Glaucoma No family hx of     Macular Degeneration No family hx of     Diabetes No family hx of     Gout No family hx of      Social History   Social History     Tobacco Use    Smoking status: Never     Passive exposure: Never    Smokeless tobacco: Never   Substance Use Topics    Alcohol use: Not Currently     Alcohol/week: 0.0 standard drinks of alcohol    Drug use: Never      A medically appropriate review of systems was performed with pertinent positives and negatives noted in the HPI, and all other systems negative.    Physical Exam   BP: 133/79  Pulse: 106  Temp: 97.9  F (36.6  C)  Resp: 16  Height: 162.6 cm (5' 4\")  Weight: 68.8 kg (151 lb 9.6 oz)  SpO2: 99 %  Physical Exam  Vitals and nursing note reviewed.   Constitutional:       General: She is not in acute distress.     Appearance: Normal appearance. She is not diaphoretic.   HENT:      Head: Normocephalic and atraumatic.   Eyes:      Extraocular Movements: Extraocular movements intact.      Conjunctiva/sclera: Conjunctivae normal.   Cardiovascular:      Rate and Rhythm: Normal rate and regular rhythm.      Pulses: Normal pulses.      Heart sounds: Normal heart sounds.   Pulmonary:      " Effort: Pulmonary effort is normal. No respiratory distress.      Breath sounds: Normal breath sounds.   Abdominal:      Palpations: Abdomen is soft.      Tenderness: There is no abdominal tenderness.   Musculoskeletal:         General: No tenderness. Normal range of motion.      Cervical back: Normal range of motion and neck supple.   Skin:     General: Skin is warm.      Findings: No rash.   Neurological:      General: No focal deficit present.      Mental Status: She is alert.      Motor: No weakness.      Coordination: Coordination normal.   Psychiatric:         Mood and Affect: Mood normal.           ED Course, Procedures, & Data      Reviewed hematology phone encounter from yesterday to arrange infusion center appointment.  Reviewed hematology sickle cell outpatient visit from 6/19/2024.    Reviewed care plan.  Reviewed previous emergency department visits for sickle cell pain crisis from 6/15/2024, 6/9/2024, 6/6/2024    Reviewed previous CBC, comprehensive metabolic panel, reticulocyte count, EKG  Procedures            EKG Interpretation:      Interpreted by LISA WORLEY MD, MD  Time reviewed: 1757  Symptoms at time of EKG: chest pain   Rhythm: normal sinus   Rate: 99  Axis: normal  Ectopy: none  Conduction: normal  ST Segments/ T Waves: No ST-T wave changes  Q Waves: none  Comparison to prior: Unchanged from 6/6/24    Clinical Impression: normal EKG           Results for orders placed or performed during the hospital encounter of 06/25/24   XR Chest 2 Views     Status: None    Narrative    EXAM: XR CHEST 2 VIEWS  LOCATION: [facility]  DATE: 6/25/2024    INDICATION: Fever, cough  COMPARISON: None.      Impression    IMPRESSION: No consolidation, pleural effusion or pneumothorax. Left IJ Mediport catheter is in good position. Normal cardiomediastinal silhouette.   Comprehensive metabolic panel     Status: Abnormal   Result Value Ref Range    Sodium 137 135 - 145  mmol/L    Potassium 4.4 3.4 - 5.3 mmol/L    Carbon Dioxide (CO2) 24 22 - 29 mmol/L    Anion Gap 10 7 - 15 mmol/L    Urea Nitrogen 7.2 6.0 - 20.0 mg/dL    Creatinine 0.50 (L) 0.51 - 0.95 mg/dL    GFR Estimate >90 >60 mL/min/1.73m2    Calcium 9.0 8.6 - 10.0 mg/dL    Chloride 103 98 - 107 mmol/L    Glucose 86 70 - 99 mg/dL    Alkaline Phosphatase 65 40 - 150 U/L    AST 57 (H) 0 - 45 U/L    ALT 31 0 - 50 U/L    Protein Total 7.9 6.4 - 8.3 g/dL    Albumin 4.8 3.5 - 5.2 g/dL    Bilirubin Total 3.1 (H) <=1.2 mg/dL   Lipase     Status: Normal   Result Value Ref Range    Lipase 22 13 - 60 U/L   Troponin T, High Sensitivity     Status: Normal   Result Value Ref Range    Troponin T, High Sensitivity <6 <=14 ng/L   Nt probnp inpatient (BNP)     Status: Normal   Result Value Ref Range    N terminal Pro BNP Inpatient 44 0 - 450 pg/mL   HCG quantitative pregnancy     Status: Normal   Result Value Ref Range    hCG Quantitative <1 <5 mIU/mL   Reticulocyte count     Status: None   Result Value Ref Range    % Reticulocyte      Absolute Reticulocyte     Symptomatic Influenza A/B, RSV, & SARS-CoV2 PCR (COVID-19) Nasopharyngeal     Status: Normal    Specimen: Nasopharyngeal; Swab   Result Value Ref Range    Influenza A PCR Negative Negative    Influenza B PCR Negative Negative    RSV PCR Negative Negative    SARS CoV2 PCR Negative Negative    Narrative    Testing was performed using the Xpert Xpress CoV2/Flu/RSV Assay on the Cepheid GeneXpert Instrument. This test should be ordered for the detection of SARS-CoV-2, influenza, and RSV viruses in individuals who meet clinical and/or epidemiological criteria. Test performance is unknown in asymptomatic patients. This test is for in vitro diagnostic use under the FDA EUA for laboratories certified under CLIA to perform high or moderate complexity testing. This test has not been FDA cleared or approved. A negative result does not rule out the presence of PCR inhibitors in the specimen or  target RNA in concentration below the limit of detection for the assay. If only one viral target is positive but coinfection with multiple targets is suspected, the sample should be re-tested with another FDA cleared, approved, or authorized test, if coinfection would change clinical management. This test was validated by the [facility]. These laboratories are certified under the Clinical Laboratory Improvement Amendments of 1988 (CLIA-88) as qualified to perform high complexity laboratory testing.   Extra Tube ([place] Draw)     Status: None    Narrative    The following orders were created for panel order Extra Tube ([place] Draw).  Procedure                               Abnormality         Status                     ---------                               -----------         ------                     Extra Blue Top Tube[[id number]]                              Final result               Extra Red Top Tube[[id number]]                               Final result                 Please view results for these tests on the individual orders.   CBC with platelets and differential     Status: Abnormal   Result Value Ref Range    WBC Count 12.1 (H) 4.0 - 11.0 10e3/uL    RBC Count 2.43 (L) 3.80 - 5.20 10e6/uL    Hemoglobin 7.2 (L) 11.7 - 15.7 g/dL    Hematocrit 20.1 (L) 35.0 - 47.0 %    MCV 83 78 - 100 fL    MCH 29.6 26.5 - 33.0 pg    MCHC 35.8 31.5 - 36.5 g/dL    RDW 21.3 (H) 10.0 - 15.0 %    Platelet Count 396 150 - 450 10e3/uL    % Neutrophils      % Lymphocytes      % Monocytes      % Eosinophils      % Basophils      % Immature Granulocytes      Absolute Neutrophils      Absolute Lymphocytes      Absolute Monocytes      Absolute Eosinophils      Absolute Basophils      Absolute Immature Granulocytes     Extra Blue Top Tube     Status: None   Result Value Ref Range    Hold Specimen JIC    Extra Red Top Tube     Status: None   Result Value Ref Range    Hold Specimen JIC    Manual Differential      Status: Abnormal   Result Value Ref Range    % Neutrophils 71 %    % Lymphocytes 13 %    % Monocytes 7 %    % Eosinophils 9 %    % Basophils 0 %    NRBCs per 100 WBC 1 (H) <=0 %    Absolute Neutrophils 8.6 (H) 1.6 - 8.3 10e3/uL    Absolute Lymphocytes 1.6 0.8 - 5.3 10e3/uL    Absolute Monocytes 0.8 0.0 - 1.3 10e3/uL    Absolute Eosinophils 1.1 (H) 0.0 - 0.7 10e3/uL    Absolute Basophils 0.0 0.0 - 0.2 10e3/uL    Absolute NRBCs 0.1 (H) <=0.0 10e3/uL    RBC Morphology Confirmed RBC Indices     Platelet Assessment  Automated Count Confirmed. Platelet morphology is normal.     Automated Count Confirmed. Platelet morphology is normal.    Sickle Cells Moderate (A) None Seen    Target Cells Moderate (A) None Seen   EKG 12-lead, tracing only     Status: None (Preliminary result)   Result Value Ref Range    Systolic Blood Pressure  mmHg    Diastolic Blood Pressure  mmHg    Ventricular Rate 99 BPM    Atrial Rate 99 BPM    SC Interval 154 ms    QRS Duration 72 ms     ms    QTc 474 ms    P Axis 59 degrees    R AXIS 47 degrees    T Axis -1 degrees    Interpretation ECG Sinus rhythm  Normal ECG      CBC with platelets differential     Status: Abnormal    Narrative    The following orders were created for panel order CBC with platelets differential.  Procedure                               Abnormality         Status                     ---------                               -----------         ------                     CBC with platelets and d...[[id number]]  Abnormal            Final result               Manual Differential[[id number]]          Abnormal            Final result                 Please view results for these tests on the individual orders.     Medications   lactated ringers infusion ( Intravenous Rate/Dose Verify 6/25/24 2036)   acetaminophen (TYLENOL) tablet 650 mg (has no administration in time range)   HYDROmorphone (DILAUDID) injection 1 mg (has no administration in time range)   HYDROmorphone (DILAUDID)  injection 1 mg (1 mg Intravenous $Given 6/25/24 2032)   ketorolac (TORADOL) injection 30 mg (30 mg Intravenous $Given 6/25/24 1755)     Labs Ordered and Resulted from Time of ED Arrival to Time of ED Departure   COMPREHENSIVE METABOLIC PANEL - Abnormal       Result Value    Sodium 137      Potassium 4.4      Carbon Dioxide (CO2) 24      Anion Gap 10      Urea Nitrogen 7.2      Creatinine 0.50 (*)     GFR Estimate >90      Calcium 9.0      Chloride 103      Glucose 86      Alkaline Phosphatase 65      AST 57 (*)     ALT 31      Protein Total 7.9      Albumin 4.8      Bilirubin Total 3.1 (*)    CBC WITH PLATELETS AND DIFFERENTIAL - Abnormal    WBC Count 12.1 (*)     RBC Count 2.43 (*)     Hemoglobin 7.2 (*)     Hematocrit 20.1 (*)     MCV 83      MCH 29.6      MCHC 35.8      RDW 21.3 (*)     Platelet Count 396      % Neutrophils        % Lymphocytes        % Monocytes        % Eosinophils        % Basophils        % Immature Granulocytes        Absolute Neutrophils        Absolute Lymphocytes        Absolute Monocytes        Absolute Eosinophils        Absolute Basophils        Absolute Immature Granulocytes       DIFFERENTIAL - Abnormal    % Neutrophils 71      % Lymphocytes 13      % Monocytes 7      % Eosinophils 9      % Basophils 0      NRBCs per 100 WBC 1 (*)     Absolute Neutrophils 8.6 (*)     Absolute Lymphocytes 1.6      Absolute Monocytes 0.8      Absolute Eosinophils 1.1 (*)     Absolute Basophils 0.0      Absolute NRBCs 0.1 (*)     RBC Morphology Confirmed RBC Indices      Platelet Assessment        Value: Automated Count Confirmed. Platelet morphology is normal.    Sickle Cells Moderate (*)     Target Cells Moderate (*)    LIPASE - Normal    Lipase 22     TROPONIN T, HIGH SENSITIVITY - Normal    Troponin T, High Sensitivity <6     NT PROBNP INPATIENT - Normal    N terminal Pro BNP Inpatient 44     HCG QUANTITATIVE PREGNANCY - Normal    hCG Quantitative <1     INFLUENZA A/B, RSV, & SARS-COV2 PCR - Normal     Influenza A PCR Negative      Influenza B PCR Negative      RSV PCR Negative      SARS CoV2 PCR Negative     RETICULOCYTE COUNT    % Reticulocyte        Absolute Reticulocyte       ROUTINE UA WITH MICROSCOPIC REFLEX TO CULTURE     XR Chest 2 Views   Final Result   IMPRESSION: No consolidation, pleural effusion or pneumothorax. Left IJ Mediport catheter is in good position. Normal cardiomediastinal silhouette.        Medications   lactated ringers infusion ( Intravenous Rate/Dose Verify 6/25/24 2036)   acetaminophen (TYLENOL) tablet 650 mg (has no administration in time range)   HYDROmorphone (DILAUDID) injection 1 mg (has no administration in time range)   HYDROmorphone (DILAUDID) injection 1 mg (1 mg Intravenous $Given 6/25/24 2032)   ketorolac (TORADOL) injection 30 mg (30 mg Intravenous $Given 6/25/24 1755)       9:17 PM feeling improved after above therapies.  Requesting 1 additional dose of pain medicine before discharge.  Given acetaminophen and additional 1 mg of Dilaudid.    Critical care was not performed.     Medical Decision Making  The patient's presentation was of moderate complexity (a chronic illness mild to moderate exacerbation, progression, or side effect of treatment).    The patient's evaluation involved:  review of external note(s) from 3+ sources (see separate area of note for details)  review of 3+ test result(s) ordered prior to this encounter (see separate area of note for details)  ordering and/or review of 3+ test(s) in this encounter (see separate area of note for details)  independent interpretation of testing performed by another health professional (EKG interpreted by me with above results.)    The patient's management necessitated high risk (a parenteral controlled substance).    Assessment & Plan    24 year old female with history of sickle cell disease with DVT, asthma, gastritis, stroke, to the emergency department with diffuse body pain.  Patient also complains of a fever this  morning and a minimal and nonproductive cough.  The patient's EKG is normal and her troponin is normal so not suspect ACS after hours of symptoms.  Her chest radiograph does not reveal any infiltrates do not suspect acute chest syndrome.  Patient is not hypoxic nor dyspneic so have low suspicion for PE.  Patient's labs are at their baseline with white count of 12, hemoglobin of 7.2, mild AST elevation.  Lipase normal so not suspect pancreatitis.  BNP normal so not suspect heart failure.  Patient feeling markedly improved after treatment according to her pain protocol.  She thought with additional dose of Dilaudid and acetaminophen that she be ready for discharge.  She appears clinically well and appropriate for outpatient management.    I have reviewed the nursing notes. I have reviewed the findings, diagnosis, plan and need for follow up with the patient.    New Prescriptions    No medications on file       Final diagnoses:   Sickle cell pain crisis (H)     Chart documentation was completed with Dragon voice-recognition software. Even though reviewed, this chart may still contain some grammatical, spelling, and word errors.     Francesco Green MD  [facility] EMERGENCY DEPARTMENT  6/25/2024     Francesco Green MD  06/26/24 1356

## 2024-06-25 NOTE — ED TRIAGE NOTES
Triage Assessment (Adult)       Row Name 06/25/24 1700          Triage Assessment    Airway WDL WDL        Respiratory WDL    Respiratory WDL WDL        Skin Circulation/Temperature WDL    Skin Circulation/Temperature WDL WDL

## 2024-06-25 NOTE — TELEPHONE ENCOUNTER
Have You Had Laser Resurfacing Before?: has had previous treatments Hydrea refill   Last prescribing provider: Patricia Mantilla     Last clinic visit date: 6/19/24 Daya Nascimento     Recommendations for requested medication (if none, N/A): Copied from chart note   Plan:  -Continue Hydrea to 3000mg daily to help lessen frequency of sickle cell pain.  -Continue monthly crizanlizumab infusions.  -Continue slow taper of oxycodone. Currently receiving oxycodone 10 mg every 4 hours PRN, qty 20. Plan to tapering to qty 15 on 6/24/24.     Any other pertinent information (if none, N/A): N/A    Refilled: Y/N, if NO, why?     When Was Your Last Laser Resurfacing Treatment?: 10/04/2023

## 2024-06-26 LAB
ATRIAL RATE - MUSE: 99 BPM
DIASTOLIC BLOOD PRESSURE - MUSE: NORMAL MMHG
INTERPRETATION ECG - MUSE: NORMAL
P AXIS - MUSE: 59 DEGREES
PR INTERVAL - MUSE: 154 MS
QRS DURATION - MUSE: 72 MS
QT - MUSE: 370 MS
QTC - MUSE: 474 MS
R AXIS - MUSE: 47 DEGREES
SYSTOLIC BLOOD PRESSURE - MUSE: NORMAL MMHG
T AXIS - MUSE: -1 DEGREES
VENTRICULAR RATE- MUSE: 99 BPM

## 2024-06-26 RX ORDER — HYDROXYUREA 500 MG/1
CAPSULE ORAL
Qty: 180 CAPSULE | Refills: 3 | Status: SHIPPED | OUTPATIENT
Start: 2024-06-26

## 2024-06-26 NOTE — DISCHARGE INSTRUCTIONS
Continue current medications.  Drink plenty of fluids.    Return if fever, trouble breathing, cough, or other concerns.    Follow-up with your hematologist.

## 2024-06-27 ENCOUNTER — INFUSION THERAPY VISIT (OUTPATIENT)
Dept: TRANSPLANT | Facility: CLINIC | Age: 24
End: 2024-06-27
Attending: INTERNAL MEDICINE
Payer: COMMERCIAL

## 2024-06-27 ENCOUNTER — NURSE TRIAGE (OUTPATIENT)
Dept: ONCOLOGY | Facility: CLINIC | Age: 24
End: 2024-06-27
Payer: COMMERCIAL

## 2024-06-27 VITALS
SYSTOLIC BLOOD PRESSURE: 125 MMHG | TEMPERATURE: 97.6 F | OXYGEN SATURATION: 96 % | DIASTOLIC BLOOD PRESSURE: 69 MMHG | RESPIRATION RATE: 16 BRPM | HEART RATE: 99 BPM

## 2024-06-27 DIAGNOSIS — D57.00 SICKLE CELL PAIN CRISIS (H): ICD-10-CM

## 2024-06-27 DIAGNOSIS — D57.00 SICKLE CELL PAIN CRISIS (H): Primary | ICD-10-CM

## 2024-06-27 DIAGNOSIS — G81.10 SPASTIC HEMIPLEGIA, UNSPECIFIED ETIOLOGY, UNSPECIFIED LATERALITY (H): ICD-10-CM

## 2024-06-27 PROCEDURE — 250N000011 HC RX IP 250 OP 636: Performed by: PEDIATRICS

## 2024-06-27 PROCEDURE — 258N000003 HC RX IP 258 OP 636: Performed by: PEDIATRICS

## 2024-06-27 PROCEDURE — 250N000013 HC RX MED GY IP 250 OP 250 PS 637: Performed by: PEDIATRICS

## 2024-06-27 PROCEDURE — 96361 HYDRATE IV INFUSION ADD-ON: CPT

## 2024-06-27 PROCEDURE — 96376 TX/PRO/DX INJ SAME DRUG ADON: CPT

## 2024-06-27 PROCEDURE — 96374 THER/PROPH/DIAG INJ IV PUSH: CPT

## 2024-06-27 PROCEDURE — 96375 TX/PRO/DX INJ NEW DRUG ADDON: CPT

## 2024-06-27 RX ORDER — HEPARIN SODIUM (PORCINE) LOCK FLUSH IV SOLN 100 UNIT/ML 100 UNIT/ML
5 SOLUTION INTRAVENOUS
Status: CANCELLED | OUTPATIENT
Start: 2024-07-01

## 2024-06-27 RX ORDER — ONDANSETRON 4 MG/1
8 TABLET, FILM COATED ORAL
Status: CANCELLED
Start: 2024-07-01

## 2024-06-27 RX ORDER — KETOROLAC TROMETHAMINE 30 MG/ML
30 INJECTION, SOLUTION INTRAMUSCULAR; INTRAVENOUS ONCE
Status: CANCELLED
Start: 2024-07-01 | End: 2024-07-01

## 2024-06-27 RX ORDER — DIPHENHYDRAMINE HCL 25 MG
50 CAPSULE ORAL
Status: CANCELLED
Start: 2024-07-01

## 2024-06-27 RX ORDER — HEPARIN SODIUM,PORCINE 10 UNIT/ML
5 VIAL (ML) INTRAVENOUS
Status: CANCELLED | OUTPATIENT
Start: 2024-07-01

## 2024-06-27 RX ORDER — HEPARIN SODIUM (PORCINE) LOCK FLUSH IV SOLN 100 UNIT/ML 100 UNIT/ML
5 SOLUTION INTRAVENOUS
Status: DISCONTINUED | OUTPATIENT
Start: 2024-06-27 | End: 2024-06-27 | Stop reason: HOSPADM

## 2024-06-27 RX ORDER — OXYCODONE HYDROCHLORIDE 10 MG/1
10 TABLET ORAL EVERY 4 HOURS PRN
Qty: 15 TABLET | Refills: 0 | Status: SHIPPED | OUTPATIENT
Start: 2024-07-01 | End: 2024-07-01

## 2024-06-27 RX ORDER — ONDANSETRON 2 MG/ML
8 INJECTION INTRAMUSCULAR; INTRAVENOUS EVERY 6 HOURS PRN
Status: DISCONTINUED | OUTPATIENT
Start: 2024-06-27 | End: 2024-06-27 | Stop reason: HOSPADM

## 2024-06-27 RX ORDER — DIPHENHYDRAMINE HCL 25 MG
50 CAPSULE ORAL
Status: COMPLETED | OUTPATIENT
Start: 2024-06-27 | End: 2024-06-27

## 2024-06-27 RX ORDER — ONDANSETRON 2 MG/ML
8 INJECTION INTRAMUSCULAR; INTRAVENOUS EVERY 6 HOURS PRN
Status: CANCELLED
Start: 2024-07-01

## 2024-06-27 RX ORDER — KETOROLAC TROMETHAMINE 30 MG/ML
30 INJECTION, SOLUTION INTRAMUSCULAR; INTRAVENOUS ONCE
Status: COMPLETED | OUTPATIENT
Start: 2024-06-27 | End: 2024-06-27

## 2024-06-27 RX ADMIN — HYDROMORPHONE HYDROCHLORIDE 1 MG: 1 INJECTION, SOLUTION INTRAMUSCULAR; INTRAVENOUS; SUBCUTANEOUS at 10:18

## 2024-06-27 RX ADMIN — Medication 5 ML: at 11:24

## 2024-06-27 RX ADMIN — DIPHENHYDRAMINE HYDROCHLORIDE 50 MG: 25 CAPSULE ORAL at 09:12

## 2024-06-27 RX ADMIN — HYDROMORPHONE HYDROCHLORIDE 1 MG: 1 INJECTION, SOLUTION INTRAMUSCULAR; INTRAVENOUS; SUBCUTANEOUS at 09:12

## 2024-06-27 RX ADMIN — HYDROMORPHONE HYDROCHLORIDE 1 MG: 1 INJECTION, SOLUTION INTRAMUSCULAR; INTRAVENOUS; SUBCUTANEOUS at 11:21

## 2024-06-27 RX ADMIN — ONDANSETRON 8 MG: 2 INJECTION INTRAMUSCULAR; INTRAVENOUS at 09:44

## 2024-06-27 RX ADMIN — KETOROLAC TROMETHAMINE 30 MG: 30 INJECTION, SOLUTION INTRAMUSCULAR; INTRAVENOUS at 09:12

## 2024-06-27 RX ADMIN — SODIUM CHLORIDE, POTASSIUM CHLORIDE, SODIUM LACTATE AND CALCIUM CHLORIDE 1000 ML: 600; 310; 30; 20 INJECTION, SOLUTION INTRAVENOUS at 09:10

## 2024-06-27 NOTE — TELEPHONE ENCOUNTER
Narcotic Refill Request    Medication(s) requested:  oxycodone  Person Requesting Refill:Jennifer Cervantes  What pain is the medication treating: sicklecell pain  How is the medication being taken?:1 tablet every 4hours as needed for pain  Does pt have enough for today? yes  Is pain being adequately controlled on the current regimen?: ok, requires IVF/Pain during weekdays  Experiencing any side effects from medication?: denies    Date of most recent appointment:  6/19/2024 virual with Daya Nascimento, 5/24/2024 inperson with Farhan mantilla CNP  Any No Show Visits:none recently  Next appointment:   8/15/2024 Farhan Mantilla CNP  Last fill date and by whom:  Daya Nascimento 6/19/2024   Reviewed: YES      Send to provider: farhan mantilla

## 2024-06-27 NOTE — TELEPHONE ENCOUNTER
Oncology Nurse Triage - Sickle Cell Pain Crisis:    Situation: Jennifer  calling about Sickle Cell Pain Crisis, requesting to be added on for IV fluids and pain medicine    Background:     Patient's last infusion was ED infusion on 6/25/2024  Last clinic visit date:6/19/2024 Daya Nascimento  Does patient have active treatment plan?  Yes      Assessment of Symptoms:  Onset/Duration of symptoms: 1 day    Is it typical sickle cell pain? Yes   Location: legs  Character: Sharp           Intensity: 8/10    Any radiation of pain, numbness, tingling, weakness, warmth, swelling, discoloration of arms or legs?No     Fever?No    Chest Pain Present: No     Shortness of breath: No     Other home therapies tried: HEAT/HEATING PAD and WARM BATH     Last home medication taken and when: this morning    Any Refills Needed?: Yes     Does patient have transportation & length of time to get to clinic: Yes         Recommendations:   0710 Ok'd by Patricia Mantilla CNP    0746 Offer for BMT at infusion  for right now (8am) Pt in agreement and is on way.       Please note, if you are late for your appt, you risk losing your infusion appt as it may delay another patient's infusion who arrived on time.

## 2024-06-27 NOTE — PROGRESS NOTES
Infusion Nursing Note:  Jennifer Cervantes presents today for IVF and pain meds.    Patient seen by provider today: No   present during visit today: Not Applicable.    Note: Allergies and home medications reviewed. 1L of LR, IV Toradol, x3 IV Dilaudid, PO Benadryl, and IV Zofran given and tolerated well. Pt admitted to infusion clinic with pain rated a 7/10 and discharged rating the pain a 6/10. Port de accessed and pt discharged.      Intravenous Access:  Implanted Port.    Treatment Conditions:  Not Applicable.      Post Infusion Assessment:  Patient tolerated infusion without incident.  Blood return noted pre and post infusion.       Discharge Plan:   Patient and/or family verbalized understanding of discharge instructions and all questions answered.  Patient discharged in stable condition accompanied by: self.      Radha Galindo RN

## 2024-06-28 ENCOUNTER — HOSPITAL ENCOUNTER (EMERGENCY)
Facility: CLINIC | Age: 24
Discharge: HOME | End: 2024-06-28
Attending: EMERGENCY MEDICINE | Admitting: EMERGENCY MEDICINE
Payer: COMMERCIAL

## 2024-06-28 ENCOUNTER — OFFICE VISIT (OUTPATIENT)
Dept: ONCOLOGY | Facility: CLINIC | Age: 24
End: 2024-06-28
Attending: STUDENT IN AN ORGANIZED HEALTH CARE EDUCATION/TRAINING PROGRAM
Payer: COMMERCIAL

## 2024-06-28 ENCOUNTER — NURSE TRIAGE (OUTPATIENT)
Dept: ONCOLOGY | Facility: CLINIC | Age: 24
End: 2024-06-28

## 2024-06-28 VITALS
SYSTOLIC BLOOD PRESSURE: 114 MMHG | TEMPERATURE: 98.3 F | RESPIRATION RATE: 16 BRPM | BODY MASS INDEX: 26.8 KG/M2 | WEIGHT: 157 LBS | OXYGEN SATURATION: 94 % | HEIGHT: 64 IN | HEART RATE: 78 BPM | DIASTOLIC BLOOD PRESSURE: 71 MMHG

## 2024-06-28 VITALS
OXYGEN SATURATION: 92 % | RESPIRATION RATE: 20 BRPM | HEART RATE: 97 BPM | WEIGHT: 157.8 LBS | BODY MASS INDEX: 27.09 KG/M2 | SYSTOLIC BLOOD PRESSURE: 112 MMHG | TEMPERATURE: 98.5 F | DIASTOLIC BLOOD PRESSURE: 69 MMHG

## 2024-06-28 DIAGNOSIS — D57.00 SICKLE CELL PAIN CRISIS (H): ICD-10-CM

## 2024-06-28 DIAGNOSIS — D64.9 ANEMIA: ICD-10-CM

## 2024-06-28 DIAGNOSIS — M54.50 LOW BACK PAIN: ICD-10-CM

## 2024-06-28 DIAGNOSIS — G81.10 SPASTIC HEMIPARESIS (H): ICD-10-CM

## 2024-06-28 DIAGNOSIS — G81.10 SPASTIC HEMIPLEGIA, UNSPECIFIED ETIOLOGY, UNSPECIFIED LATERALITY (H): ICD-10-CM

## 2024-06-28 DIAGNOSIS — I69.351 HEMIPLEGIA AND HEMIPARESIS FOLLOWING CEREBRAL INFARCTION AFFECTING RIGHT DOMINANT SIDE (H): ICD-10-CM

## 2024-06-28 DIAGNOSIS — D57.1 HB-SS DISEASE WITHOUT CRISIS (H): Primary | ICD-10-CM

## 2024-06-28 LAB
ALBUMIN SERPL BCG-MCNC: 4.7 G/DL (ref 3.5–5.2)
ALP SERPL-CCNC: 64 U/L (ref 40–150)
ALT SERPL W P-5'-P-CCNC: 33 U/L (ref 0–50)
ANION GAP SERPL CALCULATED.3IONS-SCNC: 13 MMOL/L (ref 7–15)
AST SERPL W P-5'-P-CCNC: 55 U/L (ref 0–45)
BASOPHILS # BLD AUTO: 0.3 10E3/UL (ref 0–0.2)
BASOPHILS NFR BLD AUTO: 2 %
BILIRUB SERPL-MCNC: 2.8 MG/DL
BUN SERPL-MCNC: 5.6 MG/DL (ref 6–20)
CALCIUM SERPL-MCNC: 9.3 MG/DL (ref 8.6–10)
CHLORIDE SERPL-SCNC: 103 MMOL/L (ref 98–107)
CREAT SERPL-MCNC: 0.55 MG/DL (ref 0.51–0.95)
DEPRECATED HCO3 PLAS-SCNC: 23 MMOL/L (ref 22–29)
EGFRCR SERPLBLD CKD-EPI 2021: >90 ML/MIN/1.73M2
EOSINOPHIL # BLD AUTO: 0.7 10E3/UL (ref 0–0.7)
EOSINOPHIL NFR BLD AUTO: 6 %
ERYTHROCYTE [DISTWIDTH] IN BLOOD BY AUTOMATED COUNT: 25.3 % (ref 10–15)
GLUCOSE SERPL-MCNC: 84 MG/DL (ref 70–99)
HCT VFR BLD AUTO: 19.9 % (ref 35–47)
HGB BLD-MCNC: 6.8 G/DL (ref 11.7–15.7)
HOLD SPECIMEN: NORMAL
IMM GRANULOCYTES # BLD: 0.2 10E3/UL
IMM GRANULOCYTES NFR BLD: 1 %
LYMPHOCYTES # BLD AUTO: 2.5 10E3/UL (ref 0.8–5.3)
LYMPHOCYTES NFR BLD AUTO: 20 %
MCH RBC QN AUTO: 29.4 PG (ref 26.5–33)
MCHC RBC AUTO-ENTMCNC: 34.2 G/DL (ref 31.5–36.5)
MCV RBC AUTO: 86 FL (ref 78–100)
MONOCYTES # BLD AUTO: 1.2 10E3/UL (ref 0–1.3)
MONOCYTES NFR BLD AUTO: 9 %
NEUTROPHILS # BLD AUTO: 8 10E3/UL (ref 1.6–8.3)
NEUTROPHILS NFR BLD AUTO: 62 %
NRBC # BLD AUTO: 0.6 10E3/UL
NRBC BLD AUTO-RTO: 5 /100
PLATELET # BLD AUTO: 458 10E3/UL (ref 150–450)
POTASSIUM SERPL-SCNC: 4.1 MMOL/L (ref 3.4–5.3)
PROT SERPL-MCNC: 7.7 G/DL (ref 6.4–8.3)
RBC # BLD AUTO: 2.31 10E6/UL (ref 3.8–5.2)
RETICS # AUTO: 0.52 10E6/UL (ref 0.03–0.1)
RETICS/RBC NFR AUTO: 22.4 % (ref 0.5–2)
SODIUM SERPL-SCNC: 139 MMOL/L (ref 135–145)
WBC # BLD AUTO: 12.8 10E3/UL (ref 4–11)

## 2024-06-28 PROCEDURE — 96376 TX/PRO/DX INJ SAME DRUG ADON: CPT | Mod: 59 | Performed by: EMERGENCY MEDICINE

## 2024-06-28 PROCEDURE — 96375 TX/PRO/DX INJ NEW DRUG ADDON: CPT | Performed by: EMERGENCY MEDICINE

## 2024-06-28 PROCEDURE — 36415 COLL VENOUS BLD VENIPUNCTURE: CPT

## 2024-06-28 PROCEDURE — 93010 ELECTROCARDIOGRAM REPORT: CPT

## 2024-06-28 PROCEDURE — 99285 EMERGENCY DEPT VISIT HI MDM: CPT | Mod: FS | Performed by: EMERGENCY MEDICINE

## 2024-06-28 PROCEDURE — 99284 EMERGENCY DEPT VISIT MOD MDM: CPT | Mod: 25 | Performed by: EMERGENCY MEDICINE

## 2024-06-28 PROCEDURE — 64645 CHEMODENERV 1 EXTREM 5/> EA: CPT

## 2024-06-28 PROCEDURE — 93005 ELECTROCARDIOGRAM TRACING: CPT | Performed by: EMERGENCY MEDICINE

## 2024-06-28 PROCEDURE — 250N000011 HC RX IP 250 OP 636

## 2024-06-28 PROCEDURE — 64644 CHEMODENERV 1 EXTREM 5/> MUS: CPT

## 2024-06-28 PROCEDURE — 96374 THER/PROPH/DIAG INJ IV PUSH: CPT | Mod: 59 | Performed by: EMERGENCY MEDICINE

## 2024-06-28 PROCEDURE — 250N000020 HC RX IP 250 OP 636 J0585: Mod: JZ | Performed by: PEDIATRICS

## 2024-06-28 PROCEDURE — 250N000011 HC RX IP 250 OP 636: Performed by: EMERGENCY MEDICINE

## 2024-06-28 PROCEDURE — 258N000003 HC RX IP 258 OP 636

## 2024-06-28 PROCEDURE — 96361 HYDRATE IV INFUSION ADD-ON: CPT | Performed by: EMERGENCY MEDICINE

## 2024-06-28 PROCEDURE — 85045 AUTOMATED RETICULOCYTE COUNT: CPT

## 2024-06-28 PROCEDURE — 64644 CHEMODENERV 1 EXTREM 5/> MUS: CPT | Mod: GC | Performed by: STUDENT IN AN ORGANIZED HEALTH CARE EDUCATION/TRAINING PROGRAM

## 2024-06-28 PROCEDURE — 85025 COMPLETE CBC W/AUTO DIFF WBC: CPT

## 2024-06-28 PROCEDURE — 250N000013 HC RX MED GY IP 250 OP 250 PS 637

## 2024-06-28 PROCEDURE — 80053 COMPREHEN METABOLIC PANEL: CPT

## 2024-06-28 PROCEDURE — 95874 GUIDE NERV DESTR NEEDLE EMG: CPT | Performed by: STUDENT IN AN ORGANIZED HEALTH CARE EDUCATION/TRAINING PROGRAM

## 2024-06-28 RX ORDER — HEPARIN SODIUM,PORCINE 10 UNIT/ML
5 VIAL (ML) INTRAVENOUS
Status: CANCELLED | OUTPATIENT
Start: 2024-07-01

## 2024-06-28 RX ORDER — METHOCARBAMOL 750 MG/1
750 TABLET, FILM COATED ORAL ONCE
Status: COMPLETED | OUTPATIENT
Start: 2024-06-28 | End: 2024-06-28

## 2024-06-28 RX ORDER — HEPARIN SODIUM (PORCINE) LOCK FLUSH IV SOLN 100 UNIT/ML 100 UNIT/ML
5 SOLUTION INTRAVENOUS
Status: CANCELLED | OUTPATIENT
Start: 2024-07-01

## 2024-06-28 RX ORDER — ONDANSETRON 2 MG/ML
8 INJECTION INTRAMUSCULAR; INTRAVENOUS EVERY 6 HOURS PRN
Status: CANCELLED
Start: 2024-07-01

## 2024-06-28 RX ORDER — ONDANSETRON 4 MG/1
8 TABLET, FILM COATED ORAL
Status: CANCELLED
Start: 2024-07-01

## 2024-06-28 RX ORDER — ONDANSETRON 2 MG/ML
4 INJECTION INTRAMUSCULAR; INTRAVENOUS ONCE
Status: COMPLETED | OUTPATIENT
Start: 2024-06-28 | End: 2024-06-28

## 2024-06-28 RX ORDER — KETOROLAC TROMETHAMINE 30 MG/ML
30 INJECTION, SOLUTION INTRAMUSCULAR; INTRAVENOUS ONCE
Status: CANCELLED
Start: 2024-07-01 | End: 2024-07-01

## 2024-06-28 RX ORDER — HEPARIN SODIUM (PORCINE) LOCK FLUSH IV SOLN 100 UNIT/ML 100 UNIT/ML
5-10 SOLUTION INTRAVENOUS
Status: DISCONTINUED | OUTPATIENT
Start: 2024-06-28 | End: 2024-06-29 | Stop reason: HOSPADM

## 2024-06-28 RX ORDER — LIDOCAINE 4 G/G
1 PATCH TOPICAL ONCE
Status: DISCONTINUED | OUTPATIENT
Start: 2024-06-28 | End: 2024-06-29 | Stop reason: HOSPADM

## 2024-06-28 RX ORDER — ACETAMINOPHEN 500 MG
1000 TABLET ORAL ONCE
Status: COMPLETED | OUTPATIENT
Start: 2024-06-28 | End: 2024-06-28

## 2024-06-28 RX ORDER — KETOROLAC TROMETHAMINE 30 MG/ML
30 INJECTION, SOLUTION INTRAMUSCULAR; INTRAVENOUS ONCE
Status: COMPLETED | OUTPATIENT
Start: 2024-06-28 | End: 2024-06-28

## 2024-06-28 RX ORDER — DEFERIPRONE 1000 MG/1
1 TABLET ORAL DAILY
COMMUNITY
Start: 2024-06-27 | End: 2024-08-16

## 2024-06-28 RX ORDER — DIPHENHYDRAMINE HCL 25 MG
50 CAPSULE ORAL
Status: CANCELLED
Start: 2024-07-01

## 2024-06-28 RX ADMIN — ACETAMINOPHEN 1000 MG: 500 TABLET ORAL at 22:36

## 2024-06-28 RX ADMIN — HYDROMORPHONE HYDROCHLORIDE 1 MG: 1 INJECTION, SOLUTION INTRAMUSCULAR; INTRAVENOUS; SUBCUTANEOUS at 19:38

## 2024-06-28 RX ADMIN — ONABOTULINUMTOXINA 300 UNITS: 100 INJECTION, POWDER, LYOPHILIZED, FOR SOLUTION INTRADERMAL; INTRAMUSCULAR at 08:51

## 2024-06-28 RX ADMIN — HYDROMORPHONE HYDROCHLORIDE 1 MG: 1 INJECTION, SOLUTION INTRAMUSCULAR; INTRAVENOUS; SUBCUTANEOUS at 18:22

## 2024-06-28 RX ADMIN — HEPARIN 5 ML: 100 SYRINGE at 23:10

## 2024-06-28 RX ADMIN — ONDANSETRON 4 MG: 2 INJECTION INTRAMUSCULAR; INTRAVENOUS at 21:20

## 2024-06-28 RX ADMIN — KETOROLAC TROMETHAMINE 30 MG: 30 INJECTION, SOLUTION INTRAMUSCULAR at 19:41

## 2024-06-28 RX ADMIN — HYDROMORPHONE HYDROCHLORIDE 1 MG: 1 INJECTION, SOLUTION INTRAMUSCULAR; INTRAVENOUS; SUBCUTANEOUS at 21:17

## 2024-06-28 RX ADMIN — LIDOCAINE 1 PATCH: 4 PATCH TOPICAL at 18:25

## 2024-06-28 RX ADMIN — SODIUM CHLORIDE, POTASSIUM CHLORIDE, SODIUM LACTATE AND CALCIUM CHLORIDE 1000 ML: 600; 310; 30; 20 INJECTION, SOLUTION INTRAVENOUS at 18:33

## 2024-06-28 RX ADMIN — METHOCARBAMOL 750 MG: 750 TABLET ORAL at 22:36

## 2024-06-28 ASSESSMENT — ACTIVITIES OF DAILY LIVING (ADL)
ADLS_ACUITY_SCORE: 37

## 2024-06-28 ASSESSMENT — PAIN SCALES - GENERAL: PAINLEVEL: NO PAIN (0)

## 2024-06-28 NOTE — ED TRIAGE NOTES
Sickle cell pain mainly in back.  Pain meds not helping   Triage Assessment (Adult)       Row Name 06/28/24 160          Triage Assessment    Airway WDL WDL        Respiratory WDL    Respiratory WDL WDL        Skin Circulation/Temperature WDL    Skin Circulation/Temperature WDL WDL        Cardiac WDL    Cardiac WDL WDL        Peripheral/Neurovascular WDL    Peripheral Neurovascular WDL WDL        Cognitive/Neuro/Behavioral WDL    Cognitive/Neuro/Behavioral WDL WDL

## 2024-06-28 NOTE — PATIENT INSTRUCTIONS
It was nice to see you again today.    1. You received your next set of botox injections today.  2. You will be scheduled for your next set of injections in 12 weeks.

## 2024-06-28 NOTE — NURSING NOTE
"Oncology Rooming Note    June 28, 2024 7:51 AM   Jennifer Cervantes is a 24 year old female who presents for:    Chief Complaint   Patient presents with    Oncology Clinic Visit     BOTOX injections     Initial Vitals: /69   Pulse 97   Temp 98.5  F (36.9  C)   Resp 20   Wt 71.6 kg (157 lb 12.8 oz)   SpO2 92%   BMI 27.09 kg/m   Estimated body mass index is 27.09 kg/m  as calculated from the following:    Height as of 6/25/24: 1.626 m (5' 4\").    Weight as of this encounter: 71.6 kg (157 lb 12.8 oz). Body surface area is 1.8 meters squared.  No Pain (0) Comment: Data Unavailable   No LMP recorded. Patient has had an implant.  Allergies reviewed: Yes  Medications reviewed: Yes    Medications: Medication refills not needed today.  Pharmacy name entered into EPIC:    [facility] [place], [place] - [address] [place] [address] [phone]  [facility] - [place], [place] - [facility] [address]     Frailty Screening:   Is the patient here for a new oncology consult visit in cancer care? 2. No      Clinical concerns: none.       Jake Hill            "

## 2024-06-28 NOTE — ED PROVIDER NOTES
[facility] EMERGENCY DEPARTMENT ([facility])    6/28/24       ED PROVIDER NOTE    History     Chief Complaint   Patient presents with    Sickle Cell Pain Crisis     The history is provided by the patient and medical records. No  was used.     Jennifer Cervantes is a 24 year old female with a past medical history of sickle cell anemia with frequent pain, asthma, gastritis, DVT, cerebral infarction, iron overload due to repeated red blood cell transfusions, and right upper extremity spastic hemiparesis, who presents to the ED for evaluation of sickle cell pain.  She states that her pain began this morning and is located to her bilateral low back.  She states that this location and distribution of pain is consistent with her previous sickle cell pain exacerbations.  She states that she attempted her at home pain medication regimen including ibuprofen, muscle relaxers, and oxycodone without significant improvement of her symptoms.  She called her hematology office for pain management recommendations for which they recommended to seek evaluation in the ED further if her at home regimen is not working.  She last took ibuprofen, muscle relaxer, and oxycodone at around 2 PM this afternoon.  She denies any chest pain, shortness of air, cough, or bleeding.  No fever. She denies any exacerbating activities or injury to her low back that could have caused her pain exacerbation.  She denies any saddle anesthesia, loss of bladder or bowel function, numbness, weakness, tingling into her lower extremities.  She denies any abdominal pain, nausea, or vomiting symptoms.  She denies headache and is otherwise oriented to person, place, and time in the ED.    Past Medical History  Past Medical History:   Diagnosis Date    Anxiety     Bleeding disorder (H24)     Blood clotting disorder (H24)     Cerebral infarction (H) 2015    Cognitive developmental delay     low IQ. Please recognize when managing pain and  planning with her    Depressive disorder     Hemiplegia and hemiparesis following cerebral infarction affecting right dominant side (H)     right hand contractures    Iron overload due to repeated red blood cell transfusions     Migraines     Multiple subsegmental pulmonary emboli without acute cor pulmonale (H) 02/01/2021    Oppositional defiant behavior     Presence of intrauterine contraceptive device 2/18/2020    Superficial venous thrombosis of arm, right 03/25/2021    Uncomplicated asthma      Past Surgical History:   Procedure Laterality Date    AS INSERT TUNNELED CV 2 CATH W/O PORT/PUMP      CHOLECYSTECTOMY      CV RIGHT HEART CATH MEASUREMENTS RECORDED N/A 11/18/2021    Procedure: Right Heart Cath;  Surgeon: Jackson Stauffer MD;  Location:  HEART CARDIAC CATH LAB    INSERT PORT VASCULAR ACCESS Left 4/21/2021    Procedure: INSERTION, VASCULAR ACCESS PORT (NOT SURE ON SIDE UNTIL REMOVAL);  Surgeon: Rajan More MD;  Location: [facility] OR    IR CHEST PORT PLACEMENT > 5 YRS OF AGE  4/21/2021    IR CVC NON TUNNEL LINE REMOVAL  5/6/2021    IR CVC NON TUNNEL PLACEMENT > 5 YRS  04/07/2020    IR CVC NON TUNNEL PLACEMENT > 5 YRS  4/30/2021    IR CVC NON TUNNEL PLACEMENT > 5 YRS  9/7/2022    IR PORT REMOVAL LEFT  4/21/2021    REMOVE PORT VASCULAR ACCESS Left 4/21/2021    Procedure: REMOVAL, VASCULAR ACCESS PORT LEFT;  Surgeon: Rajan More MD;  Location: [facility] OR    REPAIR TENDON ELBOW Right 10/02/2019    Procedure: Right Elbow Flexor Lengthening, Flexor Pronator Slide Of Wrist and Finger, Thumb Adductor Lengthening;  Surgeon: Anai Franco MD;  Location: [facility] OR    TONSILLECTOMY Bilateral 10/02/2019    Procedure: Bilateral Tonsillectomy;  Surgeon: Farhana Guy MD;  Location: [facility] OR    ZZC BREAST REDUCTION (INCLUDES LIPO) TIER 3 Bilateral 04/23/2019     acetaminophen (TYLENOL) 325 MG tablet  albuterol (PROAIR HFA/PROVENTIL HFA/VENTOLIN HFA) 108 (90 Base) MCG/ACT inhaler  albuterol (PROVENTIL)  (2.5 MG/3ML) 0.083% neb solution  aspirin (ASA) 81 MG chewable tablet  budesonide-formoterol (SYMBICORT) 160-4.5 MCG/ACT Inhaler  deferasirox (JADENU) 360 MG tablet  deferiprone (FERRIPROX) 1000 MG TABS tablet  hydroxyurea (HYDREA) 500 MG capsule  ibuprofen (ADVIL/MOTRIN) 600 MG tablet  melatonin 5 MG tablet  methocarbamol (ROBAXIN) 750 MG tablet  omeprazole (PRILOSEC) 20 MG DR capsule  ondansetron (ZOFRAN ODT) 8 MG ODT tab  [START ON 7/1/2024] oxyCODONE IR (ROXICODONE) 10 MG tablet  EPINEPHrine (ANY BX GENERIC EQUIV) 0.3 MG/0.3ML injection 2-pack  ibuprofen (ADVIL/MOTRIN) 800 MG tablet  naloxone (NARCAN) 4 MG/0.1ML nasal spray  naloxone (NARCAN) 4 MG/0.1ML nasal spray      Allergies   Allergen Reactions    Contrast Dye      Hives and breathing issues    Fish-Derived Products Hives    Seafood Hives    Adhesive Tape Hives     Primipore dressing causes hives    Gadolinium     Iodinated Contrast Media      Family History  Family History   Problem Relation Age of Onset    Sickle Cell Trait Mother     Hypertension Mother     Asthma Mother     Sickle Cell Trait Father     Glaucoma No family hx of     Macular Degeneration No family hx of     Diabetes No family hx of     Gout No family hx of      Social History   Social History     Tobacco Use    Smoking status: Never     Passive exposure: Never    Smokeless tobacco: Never   Substance Use Topics    Alcohol use: Not Currently     Alcohol/week: 0.0 standard drinks of alcohol    Drug use: Never      Past medical history, past surgical history, medications, allergies, family history, and social history were reviewed with the patient. No additional pertinent items.     A medically appropriate review of systems was performed with pertinent positives and negatives noted in the HPI, and all other systems negative. and A complete review of systems was performed with pertinent positives and negatives noted in the HPI, and all other systems negative.    Physical Exam   BP:  "120/70  Pulse: 94  Temp: 98.3  F (36.8  C)  Resp: 16  Height: 162.6 cm (5' 4\")  Weight: 71.2 kg (157 lb)  SpO2: 94 %    Physical Exam  Constitutional:       General: She is not in acute distress.     Appearance: Normal appearance. She is normal weight. She is not ill-appearing, toxic-appearing or diaphoretic.      Comments: Appears uncomfortable due to pain   HENT:      Mouth/Throat:      Mouth: Mucous membranes are moist.      Pharynx: Oropharynx is clear.   Cardiovascular:      Rate and Rhythm: Normal rate and regular rhythm.      Pulses: Normal pulses.   Pulmonary:      Effort: Pulmonary effort is normal. No respiratory distress.      Breath sounds: Normal breath sounds. No stridor. No wheezing, rhonchi or rales.   Chest:      Chest wall: No tenderness.   Abdominal:      General: Abdomen is flat. Bowel sounds are normal.      Palpations: Abdomen is soft.   Musculoskeletal:      Cervical back: Normal.      Thoracic back: Normal.      Lumbar back: Tenderness present. No spasms or bony tenderness.        Back:    Skin:     General: Skin is warm.      Capillary Refill: Capillary refill takes less than 2 seconds.      Findings: No erythema or rash.   Neurological:      General: No focal deficit present.      Mental Status: She is alert. Mental status is at baseline.   Psychiatric:         Mood and Affect: Mood normal.         Behavior: Behavior normal.         ED Course, Procedures, & Data     ED Course as of 06/28/24 2228 Fri Jun 28, 2024 1830 Hemoglobin(!!): 6.8   1837 Hematology/ oncology paged   [id number] Discussed patient case with hematology/ oncology fellow who was able to review patient's chart. He recommended recheck hemoglobin tomorrow if admitted today or recheck in the heme/ onc office on Monday if pain is controlled and comfortable to discharge.      Procedures            EKG Interpretation:      Interpreted by Patricia Interiano PA-C  Time reviewed: 1723   Symptoms at time of EKG: back pain   Rhythm: " normal sinus   Rate: Normal  Axis: Normal  Ectopy: none  Conduction: normal  ST Segments/ T Waves: No ST-T wave changes and No acute ischemic changes  Q Waves: none  Comparison to prior: Unchanged from 06/25/24    Clinical Impression: normal EKG     Results for orders placed or performed during the hospital encounter of 06/28/24   Reticulocyte count     Status: Abnormal   Result Value Ref Range    % Reticulocyte 22.4 (H) 0.5 - 2.0 %    Absolute Reticulocyte 0.518 (H) 0.025 - 0.095 10e6/uL   Comprehensive metabolic panel     Status: Abnormal   Result Value Ref Range    Sodium 139 135 - 145 mmol/L    Potassium 4.1 3.4 - 5.3 mmol/L    Carbon Dioxide (CO2) 23 22 - 29 mmol/L    Anion Gap 13 7 - 15 mmol/L    Urea Nitrogen 5.6 (L) 6.0 - 20.0 mg/dL    Creatinine 0.55 0.51 - 0.95 mg/dL    GFR Estimate >90 >60 mL/min/1.73m2    Calcium 9.3 8.6 - 10.0 mg/dL    Chloride 103 98 - 107 mmol/L    Glucose 84 70 - 99 mg/dL    Alkaline Phosphatase 64 40 - 150 U/L    AST 55 (H) 0 - 45 U/L    ALT 33 0 - 50 U/L    Protein Total 7.7 6.4 - 8.3 g/dL    Albumin 4.7 3.5 - 5.2 g/dL    Bilirubin Total 2.8 (H) <=1.2 mg/dL   CBC with platelets and differential     Status: Abnormal   Result Value Ref Range    WBC Count 12.8 (H) 4.0 - 11.0 10e3/uL    RBC Count 2.31 (L) 3.80 - 5.20 10e6/uL    Hemoglobin 6.8 (LL) 11.7 - 15.7 g/dL    Hematocrit 19.9 (L) 35.0 - 47.0 %    MCV 86 78 - 100 fL    MCH 29.4 26.5 - 33.0 pg    MCHC 34.2 31.5 - 36.5 g/dL    RDW 25.3 (H) 10.0 - 15.0 %    Platelet Count 458 (H) 150 - 450 10e3/uL    % Neutrophils 62 %    % Lymphocytes 20 %    % Monocytes 9 %    % Eosinophils 6 %    % Basophils 2 %    % Immature Granulocytes 1 %    NRBCs per 100 WBC 5 (H) <1 /100    Absolute Neutrophils 8.0 1.6 - 8.3 10e3/uL    Absolute Lymphocytes 2.5 0.8 - 5.3 10e3/uL    Absolute Monocytes 1.2 0.0 - 1.3 10e3/uL    Absolute Eosinophils 0.7 0.0 - 0.7 10e3/uL    Absolute Basophils 0.3 (H) 0.0 - 0.2 10e3/uL    Absolute Immature Granulocytes 0.2 <=0.4  10e3/uL    Absolute NRBCs 0.6 10e3/uL   Extra Tube     Status: None    Narrative    The following orders were created for panel order Extra Tube.  Procedure                               Abnormality         Status                     ---------                               -----------         ------                     Extra Purple Top Tube[[id number]]                            Final result                 Please view results for these tests on the individual orders.   Extra Purple Top Tube     Status: None   Result Value Ref Range    Hold Specimen [facility]    EKG 12-lead, tracing only     Status: None (Preliminary result)   Result Value Ref Range    Systolic Blood Pressure  mmHg    Diastolic Blood Pressure  mmHg    Ventricular Rate 82 BPM    Atrial Rate 82 BPM    OH Interval 152 ms    QRS Duration 74 ms     ms    QTc 455 ms    P Axis 70 degrees    R AXIS 42 degrees    T Axis 18 degrees    Interpretation ECG Sinus rhythm  Normal ECG      CBC with platelets differential     Status: Abnormal    Narrative    The following orders were created for panel order CBC with platelets differential.  Procedure                               Abnormality         Status                     ---------                               -----------         ------                     CBC with platelets and d...[[id number]]  Abnormal            Final result                 Please view results for these tests on the individual orders.     Medications   Lidocaine (LIDOCARE) 4 % Patch 1 patch (1 patch Transdermal $Patch/Med Applied 6/28/24 9669)   methocarbamol (ROBAXIN) tablet 750 mg (has no administration in time range)   acetaminophen (TYLENOL) tablet 1,000 mg (has no administration in time range)   sodium chloride (PF) 0.9% PF flush 10-20 mL (has no administration in time range)   anticoagulant citrate flush 5-10 mL (has no administration in time range)   HYDROmorphone (DILAUDID) injection 1 mg (1 mg Intravenous $Given 6/28/24 2112)    lactated ringers BOLUS 1,000 mL (0 mLs Intravenous Stopped 6/28/24 2120)   ketorolac (TORADOL) injection 30 mg (30 mg Intravenous $Given 6/28/24 1941)   ondansetron (ZOFRAN) injection 4 mg (4 mg Intravenous $Given 6/28/24 2120)     Labs Ordered and Resulted from Time of ED Arrival to Time of ED Departure   RETICULOCYTE COUNT - Abnormal       Result Value    % Reticulocyte 22.4 (*)     Absolute Reticulocyte 0.518 (*)    COMPREHENSIVE METABOLIC PANEL - Abnormal    Sodium 139      Potassium 4.1      Carbon Dioxide (CO2) 23      Anion Gap 13      Urea Nitrogen 5.6 (*)     Creatinine 0.55      GFR Estimate >90      Calcium 9.3      Chloride 103      Glucose 84      Alkaline Phosphatase 64      AST 55 (*)     ALT 33      Protein Total 7.7      Albumin 4.7      Bilirubin Total 2.8 (*)    CBC WITH PLATELETS AND DIFFERENTIAL - Abnormal    WBC Count 12.8 (*)     RBC Count 2.31 (*)     Hemoglobin 6.8 (*)     Hematocrit 19.9 (*)     MCV 86      MCH 29.4      MCHC 34.2      RDW 25.3 (*)     Platelet Count 458 (*)     % Neutrophils 62      % Lymphocytes 20      % Monocytes 9      % Eosinophils 6      % Basophils 2      % Immature Granulocytes 1      NRBCs per 100 WBC 5 (*)     Absolute Neutrophils 8.0      Absolute Lymphocytes 2.5      Absolute Monocytes 1.2      Absolute Eosinophils 0.7      Absolute Basophils 0.3 (*)     Absolute Immature Granulocytes 0.2      Absolute NRBCs 0.6       No orders to display          Critical care was not performed.     Medical Decision Making  The patient's presentation was of high complexity (a chronic illness severe exacerbation, progression, or side effect of treatment).    The patient's evaluation involved:  review of external note(s) from 1 sources (previous ED encounters for sickle cell pain crisis)  review of 1 test result(s) ordered prior to this encounter (previous imaging and labs)  ordering and/or review of 3+ test(s) in this encounter (see separate area of note for  details)  discussion of management or test interpretation with another health professional (hematology/oncology)    The patient's management necessitated high risk: IV opiates given.    Assessment & Plan    Jennifer is a 24-year-old female that presented to the ED with complaints of low back pain consistent with her previous sickle cell pain crisis exacerbations.  Acceptable vital signs on presentation.  Patient appears to be uncomfortable but in otherwise no acute distress and nontoxic-appearing.  Reproducible pain to her bilateral low back.  No red flag back pain symptoms including saddle anesthesia, loss of bladder or bowel function, or weakness into her lower extremities.  No chest symptoms reported by the patient on initial evaluation and on reassessment throughout the ED visit including chest pain, shortness of breath, cough, palpitations, or otherwise upper back pain.  Pain management plan was initiated in the ED.  IV Dilaudid 1 mg x 3, lidocaine patch, IV Toradol, p.o. Tylenol, p.o. Robaxin in the ED for pain.  Improvement of pain from an 7-8/10 on presentation down to a 3-4/10 after medications were administered.  Patient voiced comfort and agreeability with discharging home with improvement of her symptoms.  Discussed hemoglobin results with hematology/oncology who recommended repeat hemoglobin either tomorrow if patient requiring inpatient admission for pain control versus repeat in the clinic on Monday with follow-up with her regular providers.  Discussed these recommendations with the patient who was agreeable.  Patient is stable for discharge home at this time.  Strict return precautions given.  Continue pain management plan over the weekend for recurrent pain.  Follow-up closely with her hematology office on Monday for reevaluation and recheck of her symptoms as well as repeat hemoglobin.  Patient was agreeable to the discharge treatment plan, voiced understanding, and all questions were answered prior  to discharge.    I have reviewed the nursing notes. I have reviewed the findings, diagnosis, plan and need for follow up with the patient.    New Prescriptions    No medications on file       Final diagnoses:   Sickle cell pain crisis (H)   Low back pain   Anemia       Patricia Interiano PA-C    [facility] EMERGENCY DEPARTMENT  6/28/2024     Patricia Interiano PA-C  06/28/24 2220    --    ED Attending Physician Attestation    I Kilo Dai MD, cared for this patient with the Advanced Practice Provider (ANDREAS). I personally provided a substantive portion of the care for this patient, including approving the care plan for the number and complexity of problems addressed and taking responsibility related to the risk of complications and/or morbidity or mortality of patient management. Please see the ANDREAS's documentation for full details.    Patient presented with pain consistent with her usual sickle cell pain.  She denies any other concerns such as new neurologic complaints, fever, URI symptoms, chest complaints excetra.  She was treated per her care plan.  She did have a hemoglobin of 6.8 this was discussed with hematology oncology, no transfusion recommended today.  Patient will be discharged once her pain is controlled with Monday morning follow-up with heme-onc, sooner for new concerns or symptoms.      Kilo Dai MD  Emergency Medicine          Kilo Dai MD  06/29/24 1920

## 2024-06-28 NOTE — PROGRESS NOTES
PM&R Botox Procedure Note    Chief Complaint: Spastic Hemiparesis    /69   Pulse 97   Temp 98.5  F (36.9  C)   Resp 20   Wt 71.6 kg (157 lb 12.8 oz)   SpO2 92%   BMI 27.09 kg/m         Current Outpatient Medications:     deferiprone (FERRIPROX) 1000 MG TABS tablet, , Disp: , Rfl:     acetaminophen (TYLENOL) 325 MG tablet, Take 3 tablets (975 mg) by mouth every 8 hours, Disp: 270 tablet, Rfl: 0    albuterol (PROAIR HFA/PROVENTIL HFA/VENTOLIN HFA) 108 (90 Base) MCG/ACT inhaler, Inhale 2 puffs into the lungs every 6 hours as needed for shortness of breath or wheezing, Disp: 8.5 g, Rfl: 3    albuterol (PROVENTIL) (2.5 MG/3ML) 0.083% neb solution, Take 2 vials (5 mg) by nebulization every 6 hours as needed for shortness of breath or wheezing, Disp: 90 mL, Rfl: 3    aspirin (ASA) 81 MG chewable tablet, Take 1 tablet (81 mg) by mouth 2 times daily, Disp: 60 tablet, Rfl: 11    budesonide-formoterol (SYMBICORT) 160-4.5 MCG/ACT Inhaler, Inhale 2 puffs twice daily plus 1-2 puffs as needed. May use up to 12 puffs per day., Disp: 20.4 g, Rfl: 11    deferasirox (JADENU) 360 MG tablet, Take 4 tablets (1,440 mg) by mouth every evening, Disp: 120 tablet, Rfl: 4    EPINEPHrine (ANY BX GENERIC EQUIV) 0.3 MG/0.3ML injection 2-pack, Inject 0.3 mLs (0.3 mg) into the muscle as needed for anaphylaxis (Patient not taking: Reported on 6/19/2024), Disp: 1 each, Rfl: 1    hydroxyurea (HYDREA) 500 MG capsule, TAKE 6 CAPSULES (3,000 MG) BY MOUTH ONCE DAILY., Disp: 180 capsule, Rfl: 3    ibuprofen (ADVIL/MOTRIN) 600 MG tablet, Take 600 mg by mouth every 6 hours as needed for moderate pain Using rarely, 2x/week at most, Disp: , Rfl:     ibuprofen (ADVIL/MOTRIN) 800 MG tablet, Take 800 mg by mouth every 8 hours as needed for moderate pain, Disp: , Rfl:     melatonin 5 MG tablet, Take 1 tablet (5 mg) by mouth nightly as needed for sleep, Disp: 30 tablet, Rfl: 1    methocarbamol (ROBAXIN) 750 MG tablet, Take 1 tablet (750 mg) by mouth 4  times daily as needed for muscle spasms (during sickle pain crises. Okay to take scheduled while in pain), Disp: 60 tablet, Rfl: 1    naloxone (NARCAN) 4 MG/0.1ML nasal spray, Spray 1 spray (4 mg) into one nostril alternating nostrils as needed for opioid reversal every 2-3 minutes until assistance arrives (Patient not taking: Reported on 6/19/2024), Disp: 0.2 mL, Rfl: 0    naloxone (NARCAN) 4 MG/0.1ML nasal spray, Spray 4 mg into one nostril alternating nostrils as needed for opioid reversal every 2-3 minutes until assistance arrives (Patient not taking: Reported on 6/19/2024), Disp: , Rfl:     omeprazole (PRILOSEC) 20 MG DR capsule, Take 1 capsule (20 mg) by mouth daily, Disp: 30 capsule, Rfl: 0    ondansetron (ZOFRAN ODT) 8 MG ODT tab, Take 1 tablet (8 mg) by mouth every 8 hours as needed for nausea, Disp: 40 tablet, Rfl: 4    [START ON 7/1/2024] oxyCODONE IR (ROXICODONE) 10 MG tablet, Take 1 tablet (10 mg) by mouth every 4 hours as needed for severe pain or breakthrough pain Goal 4 per day. Max 6 per day., Disp: 15 tablet, Rfl: 0    Current Facility-Administered Medications:     botulinum toxin type A (BOTOX) 100 units injection 300 Units, 300 Units, Intramuscular, Q90 Days, Eric Duncan MD, 300 Units at 06/28/24 0851        Allergies   Allergen Reactions    Contrast Dye      Hives and breathing issues    Fish-Derived Products Hives    Seafood Hives    Adhesive Tape Hives     Primipore dressing causes hives    Gadolinium     Iodinated Contrast Media         PHYSICAL EXAM      Strength: 4+/5 with right shoulder abduction, 4/5 with right elbow flexion, unable to assess wrist flexion due to contracture. 4/5 with  strength on right. 5/5 left shoulder abduction, elbow extension, wrist extension and  strength/finger intrinsics. 4/5 with right hip flexion, 4/5 with right knee extension, 3/5 with right ankle dorsiflexion, 4+/5 with right EHL, plantar flexion. 5/5 with all muscle groups in left lower  "extremity.    ROM is 120 degrees with right shoulder abduction, about 130 degrees with right elbow extension.     Tone with MAS-   2/4 with right shoulder abduction   3/4 with right finger flexors/intrinsics   2/4 with right knee flexion.    Significant right wrist contracture with minimal extension.   Sensory: intact to light touch throughout all dermatomes in bilateral lower extremities.      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      Last seen 3/29/24 for last set of injections- injected total of 300 U    Since then, she has had significant improvement in tone improving mobility and ADLs over the last several weeks. Feels a little tighter in her right hamstring more so than in the past and also right elbow, forearm pronation and finger flexion, started to notice the tightness coming back over the last month.      RESPONSE TO PREVIOUS TREATMENT:  Significant improvement in tone that lasted \"several\" weeks    BOTULINUM NEUROTOXIN INJECTION PROCEDURES:    VERIFICATION OF PATIENT IDENTIFICATION  Responsible Person:  RUIZ  : verified  Full Name: verified    INDICATIONS FOR PROCEDURES:  Jennifer Cervantes is a 21 year old patient with spasticity affecting the right upper extremity and right lower extremity secondary to a diagnosis of sickle cell disease and previous CVA with resulting spastic hemiparesis with associated pain, loss of joint motion, loss of volitional motor control, hygiene difficulty, difficulty with activities of daily living and difficulty with ambulation and transfers.    Her baseline symptoms have been recalcitrant to oral medications and conservative therapy.  She is here today for reinjection with Botox.    GOAL OF PROCEDURE:  The goal of this procedure is to improve increase active range of motion, improve volitional motor control, decrease pain  and enhance functional independence associated with " spasticity.    TOTAL DOSE ADMINISTERED:  Increased dose today based on increased tone with MAS with right elbow flexion, finger flexion.    Dose Administered:  300 units Botox (Botulinum Toxin Type A)       1:1 Dilution     Diluent Used:  Preservative Free Normal Saline  Total Volume of Diluent Used:  3.0 ml  Lot no: [id number]  Exp: 06/2026    CONSENT:  The risks, benefits, and treatment options were discussed with Jennifer Cervantes and she agreed to proceed.    EQUIPMENT USED:  Needle-27mm stimulating/recording  EMG/NCS Machine    SKIN PREPARATION:  Skin preparation was performed using an alcohol wipe.    GUIDANCE DESCRIPTION:  Electro-myographic guidance was necessary throughout the procedure to accurately identify all areas of spastic muscles while avoiding injection of non-spastic muscles and underlying muscles , neighboring nerves and nearby vascular structures.     AREA/MUSCLE INJECTED:  Right biceps- 50 U at 2 sites  Right BR- 50 U  Right pronator teres- 50 U  Right FDS- 50 U  Right FCR- 40 U  Right biceps femoris- 60 U at 2 sites    RESPONSE TO PROCEDURE:  Jennifer Cervantes tolerated the procedure well and there were no immediate complications. She was allowed to recover for an appropriate period of time and was discharged home in stable condition.     Patient seen and discussed with attending physician Dr. Pierce. The attending provider was present for the entire procedure documented.    Katherine Pierce MD  Physical Medicine & Rehabilitation

## 2024-06-28 NOTE — LETTER
6/28/2024      Jennifer Cervantes  [address]  [place] [address]      Dear Colleague,    Thank you for referring your patient, Jennifer Cervantes, to the [facility]. Please see a copy of my visit note below.    PM&R Botox Procedure Note    Chief Complaint: Spastic Hemiparesis    /69   Pulse 97   Temp 98.5  F (36.9  C)   Resp 20   Wt 71.6 kg (157 lb 12.8 oz)   SpO2 92%   BMI 27.09 kg/m         Current Outpatient Medications:      deferiprone (FERRIPROX) 1000 MG TABS tablet, , Disp: , Rfl:      acetaminophen (TYLENOL) 325 MG tablet, Take 3 tablets (975 mg) by mouth every 8 hours, Disp: 270 tablet, Rfl: 0     albuterol (PROAIR HFA/PROVENTIL HFA/VENTOLIN HFA) 108 (90 Base) MCG/ACT inhaler, Inhale 2 puffs into the lungs every 6 hours as needed for shortness of breath or wheezing, Disp: 8.5 g, Rfl: 3     albuterol (PROVENTIL) (2.5 MG/3ML) 0.083% neb solution, Take 2 vials (5 mg) by nebulization every 6 hours as needed for shortness of breath or wheezing, Disp: 90 mL, Rfl: 3     aspirin (ASA) 81 MG chewable tablet, Take 1 tablet (81 mg) by mouth 2 times daily, Disp: 60 tablet, Rfl: 11     budesonide-formoterol (SYMBICORT) 160-4.5 MCG/ACT Inhaler, Inhale 2 puffs twice daily plus 1-2 puffs as needed. May use up to 12 puffs per day., Disp: 20.4 g, Rfl: 11     deferasirox (JADENU) 360 MG tablet, Take 4 tablets (1,440 mg) by mouth every evening, Disp: 120 tablet, Rfl: 4     EPINEPHrine (ANY BX GENERIC EQUIV) 0.3 MG/0.3ML injection 2-pack, Inject 0.3 mLs (0.3 mg) into the muscle as needed for anaphylaxis (Patient not taking: Reported on 6/19/2024), Disp: 1 each, Rfl: 1     hydroxyurea (HYDREA) 500 MG capsule, TAKE 6 CAPSULES (3,000 MG) BY MOUTH ONCE DAILY., Disp: 180 capsule, Rfl: 3     ibuprofen (ADVIL/MOTRIN) 600 MG tablet, Take 600 mg by mouth every 6 hours as needed for moderate pain Using rarely, 2x/week at most, Disp: , Rfl:      ibuprofen (ADVIL/MOTRIN) 800 MG tablet, Take 800 mg  by mouth every 8 hours as needed for moderate pain, Disp: , Rfl:      melatonin 5 MG tablet, Take 1 tablet (5 mg) by mouth nightly as needed for sleep, Disp: 30 tablet, Rfl: 1     methocarbamol (ROBAXIN) 750 MG tablet, Take 1 tablet (750 mg) by mouth 4 times daily as needed for muscle spasms (during sickle pain crises. Okay to take scheduled while in pain), Disp: 60 tablet, Rfl: 1     naloxone (NARCAN) 4 MG/0.1ML nasal spray, Spray 1 spray (4 mg) into one nostril alternating nostrils as needed for opioid reversal every 2-3 minutes until assistance arrives (Patient not taking: Reported on 6/19/2024), Disp: 0.2 mL, Rfl: 0     naloxone (NARCAN) 4 MG/0.1ML nasal spray, Spray 4 mg into one nostril alternating nostrils as needed for opioid reversal every 2-3 minutes until assistance arrives (Patient not taking: Reported on 6/19/2024), Disp: , Rfl:      omeprazole (PRILOSEC) 20 MG DR capsule, Take 1 capsule (20 mg) by mouth daily, Disp: 30 capsule, Rfl: 0     ondansetron (ZOFRAN ODT) 8 MG ODT tab, Take 1 tablet (8 mg) by mouth every 8 hours as needed for nausea, Disp: 40 tablet, Rfl: 4     [START ON 7/1/2024] oxyCODONE IR (ROXICODONE) 10 MG tablet, Take 1 tablet (10 mg) by mouth every 4 hours as needed for severe pain or breakthrough pain Goal 4 per day. Max 6 per day., Disp: 15 tablet, Rfl: 0    Current Facility-Administered Medications:      botulinum toxin type A (BOTOX) 100 units injection 300 Units, 300 Units, Intramuscular, Q90 Days, Eric Duncan MD, 300 Units at 06/28/24 0851        Allergies   Allergen Reactions     Contrast Dye      Hives and breathing issues     Fish-Derived Products Hives     Seafood Hives     Adhesive Tape Hives     Primipore dressing causes hives     Gadolinium      Iodinated Contrast Media         PHYSICAL EXAM      Strength: 4+/5 with right shoulder abduction, 4/5 with right elbow flexion, unable to assess wrist flexion due to contracture. 4/5 with  strength on right. 5/5 left  "shoulder abduction, elbow extension, wrist extension and  strength/finger intrinsics. 4/5 with right hip flexion, 4/5 with right knee extension, 3/5 with right ankle dorsiflexion, 4+/5 with right EHL, plantar flexion. 5/5 with all muscle groups in left lower extremity.    ROM is 120 degrees with right shoulder abduction, about 130 degrees with right elbow extension.     Tone with MAS-   2/4 with right shoulder abduction   3/4 with right finger flexors/intrinsics   2/4 with right knee flexion.    Significant right wrist contracture with minimal extension.   Sensory: intact to light touch throughout all dermatomes in bilateral lower extremities.      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      Last seen 3/29/24 for last set of injections- injected total of 300 U    Since then, she has had significant improvement in tone improving mobility and ADLs over the last several weeks. Feels a little tighter in her right hamstring more so than in the past and also right elbow, forearm pronation and finger flexion, started to notice the tightness coming back over the last month.      RESPONSE TO PREVIOUS TREATMENT:  Significant improvement in tone that lasted \"several\" weeks    BOTULINUM NEUROTOXIN INJECTION PROCEDURES:    VERIFICATION OF PATIENT IDENTIFICATION  Responsible Person:  RUIZ  : verified  Full Name: verified    INDICATIONS FOR PROCEDURES:  Jennifer Cervantes is a 21 year old patient with spasticity affecting the right upper extremity and right lower extremity secondary to a diagnosis of sickle cell disease and previous CVA with resulting spastic hemiparesis with associated pain, loss of joint motion, loss of volitional motor control, hygiene difficulty, difficulty with activities of daily living and difficulty with ambulation and transfers.    Her baseline symptoms have been recalcitrant to oral medications and conservative " therapy.  She is here today for reinjection with Botox.    GOAL OF PROCEDURE:  The goal of this procedure is to improve increase active range of motion, improve volitional motor control, decrease pain  and enhance functional independence associated with spasticity.    TOTAL DOSE ADMINISTERED:  Increased dose today based on increased tone with MAS with right elbow flexion, finger flexion.    Dose Administered:  300 units Botox (Botulinum Toxin Type A)       1:1 Dilution     Diluent Used:  Preservative Free Normal Saline  Total Volume of Diluent Used:  3.0 ml  Lot no: [id number]  Exp: 06/2026    CONSENT:  The risks, benefits, and treatment options were discussed with Jennifer Cervantes and she agreed to proceed.    EQUIPMENT USED:  Needle-27mm stimulating/recording  EMG/NCS Machine    SKIN PREPARATION:  Skin preparation was performed using an alcohol wipe.    GUIDANCE DESCRIPTION:  Electro-myographic guidance was necessary throughout the procedure to accurately identify all areas of spastic muscles while avoiding injection of non-spastic muscles and underlying muscles , neighboring nerves and nearby vascular structures.     AREA/MUSCLE INJECTED:  Right biceps- 50 U at 2 sites  Right BR- 50 U  Right pronator teres- 50 U  Right FDS- 50 U  Right FCR- 40 U  Right biceps femoris- 60 U at 2 sites    RESPONSE TO PROCEDURE:  Jennifer Cervantes tolerated the procedure well and there were no immediate complications. She was allowed to recover for an appropriate period of time and was discharged home in stable condition.     Patient seen and discussed with attending physician Dr. Pierce. The attending provider was present for the entire procedure documented.    Katherine Pierce MD  Physical Medicine & Rehabilitation         Again, thank you for allowing me to participate in the care of your patient.        Sincerely,        Katherine Pierce MD

## 2024-06-28 NOTE — TELEPHONE ENCOUNTER
Oncology Nurse Triage - Pain    Situation: Jennifer reporting the following symptoms: Pain    Background:   Treating Provider:       Date of last office visit: 06/19/24 w/Daya Nascimento    Assessment:     Location:  back pain, typical sickle cell pain  Onset:  this morning  Duration/Frequency: Constant  Rates: 7/10  Quality: throbbing  Current med(s) used: oxycodone (1 tablet every 6 hrs), muscle relaxer ( 1 tablet 4x a day) and ibuprofen (600 mg every 6-8 hrs)  Worsens or relieves with: pain medications not providing relief. Trying heat pads/hot showers w/o relief.    Denies fevers/chills, chest pain, SOB, N/V, problems with bowel/bladder    Pt wondering if provider has any other recommendations to help over the weekend. Pt states she is trying to avoid the ED.     Recommendations:   Secure message sent to Patricia Mantilla    1350 Patricia responding, Unfortunately no with the exception she can add Tylenol. If this is her typical pain, I would try to continue to rotate her current med options and stay well hydrated. If pain is uncontrolled with these meds, she would need to go to the ED.     1359 Call to pt to inform her of provider response/recommendation. Pt verbalized understanding and grateful for help.

## 2024-06-29 LAB
ATRIAL RATE - MUSE: 82 BPM
DIASTOLIC BLOOD PRESSURE - MUSE: NORMAL MMHG
INTERPRETATION ECG - MUSE: NORMAL
P AXIS - MUSE: 70 DEGREES
PR INTERVAL - MUSE: 152 MS
QRS DURATION - MUSE: 74 MS
QT - MUSE: 390 MS
QTC - MUSE: 455 MS
R AXIS - MUSE: 42 DEGREES
SYSTOLIC BLOOD PRESSURE - MUSE: NORMAL MMHG
T AXIS - MUSE: 18 DEGREES
VENTRICULAR RATE- MUSE: 82 BPM

## 2024-06-29 NOTE — DISCHARGE INSTRUCTIONS
Continue you at home pain management plan as directed for recurrent symptoms tomorrow and over the weekend  Follow-up with your hematology office on Monday for repeat check of hemoglobin   You may continue to utilize over-the-counter lidocaine patches as directed only for low back pain  Otherwise, do not hesitate to return to the ED if you have any worsening or concerning signs or symptoms or further exacerbations of your sickle cell pain

## 2024-07-01 ENCOUNTER — NURSE TRIAGE (OUTPATIENT)
Dept: ONCOLOGY | Facility: CLINIC | Age: 24
End: 2024-07-01
Payer: COMMERCIAL

## 2024-07-01 ENCOUNTER — PATIENT OUTREACH (OUTPATIENT)
Dept: CARE COORDINATION | Facility: CLINIC | Age: 24
End: 2024-07-01
Payer: COMMERCIAL

## 2024-07-01 ENCOUNTER — INFUSION THERAPY VISIT (OUTPATIENT)
Dept: TRANSPLANT | Facility: CLINIC | Age: 24
End: 2024-07-01
Attending: PEDIATRICS
Payer: COMMERCIAL

## 2024-07-01 ENCOUNTER — APPOINTMENT (OUTPATIENT)
Dept: LAB | Facility: CLINIC | Age: 24
End: 2024-07-01
Attending: PEDIATRICS
Payer: COMMERCIAL

## 2024-07-01 VITALS
OXYGEN SATURATION: 97 % | BODY MASS INDEX: 26.21 KG/M2 | SYSTOLIC BLOOD PRESSURE: 142 MMHG | HEART RATE: 94 BPM | DIASTOLIC BLOOD PRESSURE: 94 MMHG | RESPIRATION RATE: 18 BRPM | WEIGHT: 152.7 LBS | TEMPERATURE: 97.7 F

## 2024-07-01 DIAGNOSIS — D57.00 SICKLE CELL PAIN CRISIS (H): Primary | ICD-10-CM

## 2024-07-01 DIAGNOSIS — G81.10 SPASTIC HEMIPLEGIA, UNSPECIFIED ETIOLOGY, UNSPECIFIED LATERALITY (H): Primary | ICD-10-CM

## 2024-07-01 DIAGNOSIS — D57.00 SICKLE CELL PAIN CRISIS (H): ICD-10-CM

## 2024-07-01 LAB
ALBUMIN SERPL BCG-MCNC: 5.1 G/DL (ref 3.5–5.2)
ALP SERPL-CCNC: 69 U/L (ref 40–150)
ALT SERPL W P-5'-P-CCNC: 24 U/L (ref 0–50)
ANION GAP SERPL CALCULATED.3IONS-SCNC: 13 MMOL/L (ref 7–15)
AST SERPL W P-5'-P-CCNC: 46 U/L (ref 0–45)
BASOPHILS # BLD AUTO: 0.2 10E3/UL (ref 0–0.2)
BASOPHILS NFR BLD AUTO: 2 %
BILIRUB SERPL-MCNC: 3.2 MG/DL
BUN SERPL-MCNC: 7.3 MG/DL (ref 6–20)
CALCIUM SERPL-MCNC: 9.3 MG/DL (ref 8.6–10)
CHLORIDE SERPL-SCNC: 104 MMOL/L (ref 98–107)
CREAT SERPL-MCNC: 0.49 MG/DL (ref 0.51–0.95)
DEPRECATED HCO3 PLAS-SCNC: 20 MMOL/L (ref 22–29)
EGFRCR SERPLBLD CKD-EPI 2021: >90 ML/MIN/1.73M2
EOSINOPHIL # BLD AUTO: 0.4 10E3/UL (ref 0–0.7)
EOSINOPHIL NFR BLD AUTO: 3 %
ERYTHROCYTE [DISTWIDTH] IN BLOOD BY AUTOMATED COUNT: 26.3 % (ref 10–15)
GLUCOSE SERPL-MCNC: 91 MG/DL (ref 70–99)
HCT VFR BLD AUTO: 21.3 % (ref 35–47)
HGB BLD-MCNC: 7.5 G/DL (ref 11.7–15.7)
IMM GRANULOCYTES # BLD: 0.1 10E3/UL
IMM GRANULOCYTES NFR BLD: 0 %
LYMPHOCYTES # BLD AUTO: 1.9 10E3/UL (ref 0.8–5.3)
LYMPHOCYTES NFR BLD AUTO: 13 %
MCH RBC QN AUTO: 31 PG (ref 26.5–33)
MCHC RBC AUTO-ENTMCNC: 35.2 G/DL (ref 31.5–36.5)
MCV RBC AUTO: 88 FL (ref 78–100)
MONOCYTES # BLD AUTO: 0.9 10E3/UL (ref 0–1.3)
MONOCYTES NFR BLD AUTO: 6 %
NEUTROPHILS # BLD AUTO: 11 10E3/UL (ref 1.6–8.3)
NEUTROPHILS NFR BLD AUTO: 76 %
NRBC # BLD AUTO: 0.8 10E3/UL
NRBC BLD AUTO-RTO: 5 /100
PLATELET # BLD AUTO: 449 10E3/UL (ref 150–450)
POTASSIUM SERPL-SCNC: 4.1 MMOL/L (ref 3.4–5.3)
PROT SERPL-MCNC: 8.5 G/DL (ref 6.4–8.3)
RBC # BLD AUTO: 2.42 10E6/UL (ref 3.8–5.2)
RETICS # AUTO: 0.64 10E6/UL (ref 0.03–0.1)
RETICS/RBC NFR AUTO: 26.1 % (ref 0.5–2)
SODIUM SERPL-SCNC: 137 MMOL/L (ref 135–145)
WBC # BLD AUTO: 14.4 10E3/UL (ref 4–11)

## 2024-07-01 PROCEDURE — 258N000003 HC RX IP 258 OP 636: Performed by: PEDIATRICS

## 2024-07-01 PROCEDURE — 36591 DRAW BLOOD OFF VENOUS DEVICE: CPT

## 2024-07-01 PROCEDURE — 250N000011 HC RX IP 250 OP 636: Performed by: PEDIATRICS

## 2024-07-01 PROCEDURE — 82040 ASSAY OF SERUM ALBUMIN: CPT

## 2024-07-01 PROCEDURE — 85045 AUTOMATED RETICULOCYTE COUNT: CPT

## 2024-07-01 PROCEDURE — 96376 TX/PRO/DX INJ SAME DRUG ADON: CPT

## 2024-07-01 PROCEDURE — 96375 TX/PRO/DX INJ NEW DRUG ADDON: CPT

## 2024-07-01 PROCEDURE — 96361 HYDRATE IV INFUSION ADD-ON: CPT

## 2024-07-01 PROCEDURE — 250N000013 HC RX MED GY IP 250 OP 250 PS 637: Performed by: PEDIATRICS

## 2024-07-01 PROCEDURE — 85025 COMPLETE CBC W/AUTO DIFF WBC: CPT

## 2024-07-01 PROCEDURE — 96374 THER/PROPH/DIAG INJ IV PUSH: CPT

## 2024-07-01 RX ORDER — DIPHENHYDRAMINE HCL 25 MG
50 CAPSULE ORAL
Status: COMPLETED | OUTPATIENT
Start: 2024-07-01 | End: 2024-07-01

## 2024-07-01 RX ORDER — KETOROLAC TROMETHAMINE 30 MG/ML
30 INJECTION, SOLUTION INTRAMUSCULAR; INTRAVENOUS ONCE
Status: COMPLETED | OUTPATIENT
Start: 2024-07-01 | End: 2024-07-01

## 2024-07-01 RX ORDER — HEPARIN SODIUM (PORCINE) LOCK FLUSH IV SOLN 100 UNIT/ML 100 UNIT/ML
5 SOLUTION INTRAVENOUS
Status: DISCONTINUED | OUTPATIENT
Start: 2024-07-01 | End: 2024-07-01 | Stop reason: HOSPADM

## 2024-07-01 RX ORDER — ONDANSETRON 2 MG/ML
8 INJECTION INTRAMUSCULAR; INTRAVENOUS EVERY 6 HOURS PRN
Status: CANCELLED
Start: 2024-10-01

## 2024-07-01 RX ORDER — HEPARIN SODIUM (PORCINE) LOCK FLUSH IV SOLN 100 UNIT/ML 100 UNIT/ML
5 SOLUTION INTRAVENOUS
Status: CANCELLED | OUTPATIENT
Start: 2024-10-01

## 2024-07-01 RX ORDER — ONDANSETRON 4 MG/1
8 TABLET, FILM COATED ORAL
Status: CANCELLED
Start: 2024-10-01

## 2024-07-01 RX ORDER — HEPARIN SODIUM,PORCINE 10 UNIT/ML
5 VIAL (ML) INTRAVENOUS
Status: CANCELLED | OUTPATIENT
Start: 2024-10-01

## 2024-07-01 RX ORDER — DIPHENHYDRAMINE HCL 25 MG
50 CAPSULE ORAL
Status: CANCELLED
Start: 2024-10-01

## 2024-07-01 RX ORDER — OXYCODONE HYDROCHLORIDE 10 MG/1
10 TABLET ORAL EVERY 4 HOURS PRN
Qty: 15 TABLET | Refills: 0 | Status: SHIPPED | OUTPATIENT
Start: 2024-07-01 | End: 2024-07-08

## 2024-07-01 RX ORDER — ONDANSETRON 2 MG/ML
8 INJECTION INTRAMUSCULAR; INTRAVENOUS EVERY 6 HOURS PRN
Status: DISCONTINUED | OUTPATIENT
Start: 2024-07-01 | End: 2024-07-01 | Stop reason: HOSPADM

## 2024-07-01 RX ORDER — KETOROLAC TROMETHAMINE 30 MG/ML
30 INJECTION, SOLUTION INTRAMUSCULAR; INTRAVENOUS ONCE
Status: CANCELLED
Start: 2024-10-01 | End: 2024-10-01

## 2024-07-01 RX ADMIN — SODIUM CHLORIDE, POTASSIUM CHLORIDE, SODIUM LACTATE AND CALCIUM CHLORIDE 1000 ML: 600; 310; 30; 20 INJECTION, SOLUTION INTRAVENOUS at 11:29

## 2024-07-01 RX ADMIN — ONDANSETRON 8 MG: 2 INJECTION INTRAMUSCULAR; INTRAVENOUS at 11:30

## 2024-07-01 RX ADMIN — Medication 5 ML: at 14:36

## 2024-07-01 RX ADMIN — HYDROMORPHONE HYDROCHLORIDE 1 MG: 1 INJECTION, SOLUTION INTRAMUSCULAR; INTRAVENOUS; SUBCUTANEOUS at 12:27

## 2024-07-01 RX ADMIN — HYDROMORPHONE HYDROCHLORIDE 1 MG: 1 INJECTION, SOLUTION INTRAMUSCULAR; INTRAVENOUS; SUBCUTANEOUS at 13:26

## 2024-07-01 RX ADMIN — KETOROLAC TROMETHAMINE 30 MG: 30 INJECTION, SOLUTION INTRAMUSCULAR; INTRAVENOUS at 11:31

## 2024-07-01 RX ADMIN — DIPHENHYDRAMINE HYDROCHLORIDE 50 MG: 25 CAPSULE ORAL at 11:30

## 2024-07-01 RX ADMIN — HYDROMORPHONE HYDROCHLORIDE 1 MG: 1 INJECTION, SOLUTION INTRAMUSCULAR; INTRAVENOUS; SUBCUTANEOUS at 11:31

## 2024-07-01 ASSESSMENT — PAIN SCALES - GENERAL: PAINLEVEL: EXTREME PAIN (8)

## 2024-07-01 NOTE — TELEPHONE ENCOUNTER
Approved by Patricia Mantilla, Get labs CBC, CMP, and retic.     Orders for labs entered.     Can take at 11:30 for labs and at noon for infusion with BMT.     Message sent to SW to arrange transportation     Message sent to [facility] to schedule

## 2024-07-01 NOTE — TELEPHONE ENCOUNTER
Oncology Nurse Triage - Sickle Cell Pain Crisis:    Situation: Jennifer  calling about Sickle Cell Pain Crisis, requesting to be added on for IV fluids and pain medicine    Background:     Patient's last infusion was 6/27/24  Last clinic visit date:6/19/24 Daya Nascimento   Does patient have active treatment plan?  Yes      Assessment of Symptoms:  Onset/Duration of symptoms: 1 day    Is it typical sickle cell pain? Yes   Location: all over   Character: Dull ache           Intensity: 7/10    Any radiation of pain, numbness, tingling, weakness, warmth, swelling, discoloration of arms or legs?No     Fever?No  (if yes max temperature recorded in last 24 hours):      Chest Pain Present: No     Shortness of breath: No     Other home therapies tried: HEAT/HEATING PAD and WARM BATH     Last home medication taken and when: 6am muscle relaxer ibuprofen and oxycodone     Any Refills Needed?: Yes     Does patient have transportation & length of time to get to clinic: Yes if an early morning, otherwise needs help with transportation         Recommendations:     Placed on infusion call list, asked provider if needs lab due to Hgb of 6.8 while in ER     If you do not hear from the infusion center by 2pm then you will not be able to get in for an infusion today. If symptoms worsen while waiting for call back, and/or you experience fever, chills, SOB, chest pain, cough, n/v, dizziness, numbness, swelling, discoloration of extremities, then seek emergency evaluation in Emergency Department.     Please note, if you are late for your appt, you risk losing your infusion appt as it may delay another patient's infusion who arrived on time.

## 2024-07-01 NOTE — NURSING NOTE
Chief Complaint   Patient presents with    Labs Only     Labs drawn via port by RN in lab, vitals completed,     Port accessed using 20g 3/4inch needle, pt tolerated well. Labs collected, line flushed with saline- pt going straight to infusion. VS obtained.    Allison Bowman, RN

## 2024-07-01 NOTE — NURSING NOTE
"Oncology Rooming Note    July 1, 2024 1:31 PM   Jennifer Cervantes is a 24 year old female who presents for:    Chief Complaint   Patient presents with    Labs Only     Labs drawn via port by RN in lab, vitals completed,    Infusion     Add on appointment for IV fluids and pain medications. History of sickle cell disease.     Initial Vitals: BP (!) 142/94 (BP Location: Right arm)   Pulse 94   Temp 97.7  F (36.5  C) (Oral)   Resp 18   Wt 69.3 kg (152 lb 11.2 oz)   SpO2 97%   BMI 26.21 kg/m   Estimated body mass index is 26.21 kg/m  as calculated from the following:    Height as of 6/28/24: 1.626 m (5' 4\").    Weight as of this encounter: 69.3 kg (152 lb 11.2 oz). Body surface area is 1.77 meters squared.  Extreme Pain (8) Comment: Data Unavailable   No LMP recorded. Patient has had an implant.  Allergies reviewed: Yes  Medications reviewed: Yes    Medications: Refills needed today; prescription already sent to pharmacy by farhan JOHNS.  Pharmacy name entered into [facility]:    [facility] - [place], [place] - [phone]  [facility] - [place], [place] - [address] [place] [address]    Frailty Screening:   Is the patient here for a new oncology consult visit in cancer care? 2. No      Clinical concerns: none     Jamaica Napoles RN              "

## 2024-07-01 NOTE — PROGRESS NOTES
Infusion Nursing Note:  Jennifer Cervantes presents today for IV fluids and pain medications.    Patient seen by provider today: No   present during visit today: Not Applicable.    Note:   Pt received a liter of LR over 2 hours.    Pt received benadryl 50 mg po, zofran 8 mg IV, and toradol 30 mg IV.    Pt given 1 mg dilaudid every hour for 3 doses (3 mg dilaudid total).      Intravenous Access:  Implanted Port.    Treatment Conditions:  Lab Results   Component Value Date    HGB 7.5 (L) 07/01/2024    WBC 14.4 (H) 07/01/2024    ANEU 8.6 (H) 06/25/2024    ANEUTAUTO 11.0 (H) 07/01/2024     07/01/2024        Lab Results   Component Value Date     07/01/2024    POTASSIUM 4.1 07/01/2024    MAG 2.0 05/16/2024    CR 0.49 (L) 07/01/2024    MICAH 9.3 07/01/2024    BILITOTAL 3.2 (H) 07/01/2024    ALBUMIN 5.1 07/01/2024    ALT 24 07/01/2024    AST 46 (H) 07/01/2024       Labs were monitored.      Post Infusion Assessment:  Patient tolerated infusion without incident.  Blood return noted pre and post infusion.  Site patent and intact, free from redness, edema or discomfort.  No evidence of extravasations.  Access discontinued per protocol.       Discharge Plan:   Discharge instructions reviewed with: Patient.  Patient and/or family verbalized understanding of discharge instructions and all questions answered.  Patient discharged in stable condition accompanied by: self.  Departure Mode: Ambulatory.      Jamaica Napoles RN

## 2024-07-01 NOTE — TELEPHONE ENCOUNTER
Narcotic Refill Request    Medication(s) requested:  oxycodone   Person Requesting Refill:Jennifer   What pain is the medication treating: sickle cell pain   How is the medication being taken?:every 6 hours   Does pt have enough for today? Will be out today   Is pain being adequately controlled on the current regimen?: yes   Experiencing any side effects from medication?: None     Date of most recent appointment:  6/19/24 Daya Nascimento   Any No Show Visits:No   Next appointment:   8/15/24 Patricia Mantilla   Last fill date and by whom:  6/27/24 Patricia mantilla    Reviewed: No access     Send to provider: Patricia Mantilla and Arely Krueger

## 2024-07-01 NOTE — PROGRESS NOTES
Social Work - Transportation  [facility]    Data/Intervention:  Patient Name: Jennifer Cervantes Goes By: Jennifer    /Age: 2000 (24 year old)    Referral From: [facility]  Reason for Referral:  support requested for patient transportation needs for today's appointment.  Assessment:  called [employer] to arrange ride through patient's insurance. [employer] arranged  for patient from home with [employer]. Patient will need to call [phone] when ready for return ride home.  Plan: Patient is aware of the transportation plan.  available to assist with any other needs.    CARLOS Chavez,LGUDAY  Hematology/Oncology Social Worker  Phone:[phone] Pager: [phone]

## 2024-07-03 ENCOUNTER — INFUSION THERAPY VISIT (OUTPATIENT)
Dept: TRANSPLANT | Facility: CLINIC | Age: 24
End: 2024-07-03
Attending: PEDIATRICS
Payer: COMMERCIAL

## 2024-07-03 ENCOUNTER — OFFICE VISIT (OUTPATIENT)
Dept: INTERNAL MEDICINE | Facility: CLINIC | Age: 24
End: 2024-07-03
Payer: COMMERCIAL

## 2024-07-03 ENCOUNTER — NURSE TRIAGE (OUTPATIENT)
Dept: ONCOLOGY | Facility: CLINIC | Age: 24
End: 2024-07-03

## 2024-07-03 VITALS
BODY MASS INDEX: 26.43 KG/M2 | DIASTOLIC BLOOD PRESSURE: 63 MMHG | HEART RATE: 104 BPM | SYSTOLIC BLOOD PRESSURE: 119 MMHG | OXYGEN SATURATION: 99 % | WEIGHT: 154 LBS

## 2024-07-03 VITALS
TEMPERATURE: 98.2 F | DIASTOLIC BLOOD PRESSURE: 80 MMHG | HEART RATE: 92 BPM | RESPIRATION RATE: 16 BRPM | OXYGEN SATURATION: 93 % | SYSTOLIC BLOOD PRESSURE: 133 MMHG

## 2024-07-03 DIAGNOSIS — D57.00 SICKLE CELL PAIN CRISIS (H): ICD-10-CM

## 2024-07-03 DIAGNOSIS — G89.29 OTHER CHRONIC PAIN: Primary | ICD-10-CM

## 2024-07-03 DIAGNOSIS — F41.0 GENERALIZED ANXIETY DISORDER WITH PANIC ATTACKS: ICD-10-CM

## 2024-07-03 DIAGNOSIS — D57.00 SICKLE CELL PAIN CRISIS (H): Primary | ICD-10-CM

## 2024-07-03 DIAGNOSIS — F41.1 GENERALIZED ANXIETY DISORDER WITH PANIC ATTACKS: ICD-10-CM

## 2024-07-03 DIAGNOSIS — G81.10 SPASTIC HEMIPLEGIA, UNSPECIFIED ETIOLOGY, UNSPECIFIED LATERALITY (H): ICD-10-CM

## 2024-07-03 DIAGNOSIS — Z97.5 NEXPLANON IN PLACE: ICD-10-CM

## 2024-07-03 PROBLEM — R06.02 SHORTNESS OF BREATH: Status: ACTIVE | Noted: 2022-08-20

## 2024-07-03 PROBLEM — J45.40 MODERATE PERSISTENT ASTHMA WITHOUT COMPLICATION: Status: ACTIVE | Noted: 2019-05-03

## 2024-07-03 PROCEDURE — 96361 HYDRATE IV INFUSION ADD-ON: CPT

## 2024-07-03 PROCEDURE — 250N000013 HC RX MED GY IP 250 OP 250 PS 637: Performed by: PEDIATRICS

## 2024-07-03 PROCEDURE — 96376 TX/PRO/DX INJ SAME DRUG ADON: CPT

## 2024-07-03 PROCEDURE — 96375 TX/PRO/DX INJ NEW DRUG ADDON: CPT

## 2024-07-03 PROCEDURE — 250N000011 HC RX IP 250 OP 636: Performed by: PEDIATRICS

## 2024-07-03 PROCEDURE — 258N000003 HC RX IP 258 OP 636: Performed by: PEDIATRICS

## 2024-07-03 PROCEDURE — 99215 OFFICE O/P EST HI 40 MIN: CPT | Performed by: PEDIATRICS

## 2024-07-03 PROCEDURE — 96374 THER/PROPH/DIAG INJ IV PUSH: CPT

## 2024-07-03 RX ORDER — ONDANSETRON 4 MG/1
8 TABLET, FILM COATED ORAL
Status: CANCELLED
Start: 2024-10-01

## 2024-07-03 RX ORDER — HEPARIN SODIUM,PORCINE 10 UNIT/ML
5 VIAL (ML) INTRAVENOUS
Status: CANCELLED | OUTPATIENT
Start: 2024-10-01

## 2024-07-03 RX ORDER — KETOROLAC TROMETHAMINE 30 MG/ML
30 INJECTION, SOLUTION INTRAMUSCULAR; INTRAVENOUS ONCE
Status: COMPLETED | OUTPATIENT
Start: 2024-07-03 | End: 2024-07-03

## 2024-07-03 RX ORDER — DIPHENHYDRAMINE HCL 25 MG
50 CAPSULE ORAL
Status: COMPLETED | OUTPATIENT
Start: 2024-07-03 | End: 2024-07-03

## 2024-07-03 RX ORDER — HEPARIN SODIUM (PORCINE) LOCK FLUSH IV SOLN 100 UNIT/ML 100 UNIT/ML
5 SOLUTION INTRAVENOUS
Status: CANCELLED | OUTPATIENT
Start: 2024-10-01

## 2024-07-03 RX ORDER — GABAPENTIN 300 MG/1
300 CAPSULE ORAL 3 TIMES DAILY
Qty: 90 CAPSULE | Refills: 1 | Status: SHIPPED | OUTPATIENT
Start: 2024-07-03 | End: 2024-09-23

## 2024-07-03 RX ORDER — KETOROLAC TROMETHAMINE 30 MG/ML
30 INJECTION, SOLUTION INTRAMUSCULAR; INTRAVENOUS ONCE
Status: CANCELLED
Start: 2024-10-01 | End: 2024-10-01

## 2024-07-03 RX ORDER — DIPHENHYDRAMINE HCL 25 MG
50 CAPSULE ORAL
Status: CANCELLED
Start: 2024-10-01

## 2024-07-03 RX ORDER — ONDANSETRON 2 MG/ML
8 INJECTION INTRAMUSCULAR; INTRAVENOUS EVERY 6 HOURS PRN
Status: DISCONTINUED | OUTPATIENT
Start: 2024-07-03 | End: 2024-07-03 | Stop reason: HOSPADM

## 2024-07-03 RX ORDER — ONDANSETRON 2 MG/ML
8 INJECTION INTRAMUSCULAR; INTRAVENOUS EVERY 6 HOURS PRN
Status: CANCELLED
Start: 2024-10-01

## 2024-07-03 RX ADMIN — KETOROLAC TROMETHAMINE 30 MG: 30 INJECTION, SOLUTION INTRAMUSCULAR; INTRAVENOUS at 10:39

## 2024-07-03 RX ADMIN — DIPHENHYDRAMINE HYDROCHLORIDE 50 MG: 25 CAPSULE ORAL at 10:38

## 2024-07-03 RX ADMIN — HYDROMORPHONE HYDROCHLORIDE 1 MG: 1 INJECTION, SOLUTION INTRAMUSCULAR; INTRAVENOUS; SUBCUTANEOUS at 10:39

## 2024-07-03 RX ADMIN — SODIUM CHLORIDE, POTASSIUM CHLORIDE, SODIUM LACTATE AND CALCIUM CHLORIDE 1000 ML: 600; 310; 30; 20 INJECTION, SOLUTION INTRAVENOUS at 10:48

## 2024-07-03 RX ADMIN — HYDROMORPHONE HYDROCHLORIDE 1 MG: 1 INJECTION, SOLUTION INTRAMUSCULAR; INTRAVENOUS; SUBCUTANEOUS at 13:00

## 2024-07-03 RX ADMIN — ONDANSETRON 8 MG: 2 INJECTION INTRAMUSCULAR; INTRAVENOUS at 10:38

## 2024-07-03 RX ADMIN — HYDROMORPHONE HYDROCHLORIDE 1 MG: 1 INJECTION, SOLUTION INTRAMUSCULAR; INTRAVENOUS; SUBCUTANEOUS at 11:50

## 2024-07-03 SDOH — HEALTH STABILITY: PHYSICAL HEALTH: ON AVERAGE, HOW MANY DAYS PER WEEK DO YOU ENGAGE IN MODERATE TO STRENUOUS EXERCISE (LIKE A BRISK WALK)?: 1 DAY

## 2024-07-03 ASSESSMENT — ASTHMA QUESTIONNAIRES
QUESTION_4 LAST FOUR WEEKS HOW OFTEN HAVE YOU USED YOUR RESCUE INHALER OR NEBULIZER MEDICATION (SUCH AS ALBUTEROL): THREE OR MORE TIMES PER DAY
ACT_TOTALSCORE: 11
ACT_TOTALSCORE: 11
QUESTION_3 LAST FOUR WEEKS HOW OFTEN DID YOUR ASTHMA SYMPTOMS (WHEEZING, COUGHING, SHORTNESS OF BREATH, CHEST TIGHTNESS OR PAIN) WAKE YOU UP AT NIGHT OR EARLIER THAN USUAL IN THE MORNING: TWO OR THREE NIGHTS A WEEK
QUESTION_5 LAST FOUR WEEKS HOW WOULD YOU RATE YOUR ASTHMA CONTROL: SOMEWHAT CONTROLLED
QUESTION_2 LAST FOUR WEEKS HOW OFTEN HAVE YOU HAD SHORTNESS OF BREATH: THREE TO SIX TIMES A WEEK
HOSPITALIZATION_OVERNIGHT_LAST_YEAR_TOTAL: THREE OR MORE
QUESTION_1 LAST FOUR WEEKS HOW MUCH OF THE TIME DID YOUR ASTHMA KEEP YOU FROM GETTING AS MUCH DONE AT WORK, SCHOOL OR AT HOME: MOST OF THE TIME
EMERGENCY_ROOM_LAST_YEAR_TOTAL: THREE OR MORE

## 2024-07-03 ASSESSMENT — PATIENT HEALTH QUESTIONNAIRE - PHQ9
SUM OF ALL RESPONSES TO PHQ QUESTIONS 1-9: 3
SUM OF ALL RESPONSES TO PHQ QUESTIONS 1-9: 3
10. IF YOU CHECKED OFF ANY PROBLEMS, HOW DIFFICULT HAVE THESE PROBLEMS MADE IT FOR YOU TO DO YOUR WORK, TAKE CARE OF THINGS AT HOME, OR GET ALONG WITH OTHER PEOPLE: SOMEWHAT DIFFICULT

## 2024-07-03 ASSESSMENT — PAIN SCALES - GENERAL: PAINLEVEL: EXTREME PAIN (8)

## 2024-07-03 ASSESSMENT — SOCIAL DETERMINANTS OF HEALTH (SDOH): HOW OFTEN DO YOU GET TOGETHER WITH FRIENDS OR RELATIVES?: NEVER

## 2024-07-03 NOTE — PROGRESS NOTES
"Dear patient. Thank you for visiting with me. I want you to feel respected, understood, and empowered. \"Respect\" is valuing you as much as I would a close family member. \"Empowerment\" happens when you are fully informed, and can make the best possible decision for you.  Please ask me questions!  Challenge anything that is not clear.    Medical records are primarily used as memory aids for me and my colleagues. Things to know about my documentation style:  - The 'problem list' includes current symptoms or diagnoses, and some problems that are resolved but may return. I use the past medical history for problems not expected to return.  - I use single quotation marks for things that you or I said, when I want to clarify who was speaking.  - I use double quotation marks when copying a term from elsewhere in your records. Italics (besides here) may also denote a quotation.  If you have questions or concerns, please contact me; I will reply as soon as time allows.    Jennifer Cervantes is a 24 year old woman, with concerns including:  Patient presents with:  Physical: Pt here or annual physical      PCP: Suraj Case   Visit type: problem-oriented      Assessment & Plan        Disposition comment: this was a shortened appointment because I was running late and Jennifer managed to get into an infusion appointment. We agreed to defer the preventive screening, etc, until our next appointment. Also we will be meeting more frequently now (see below).            Problem List as of 7/3/2024 Reviewed: 10/30/2023 11:15 AM by Chapito Fischer            Noted    1. Other chronic pain - Primary 2/18/2020     Overview Signed 7/3/2024  3:32 PM by Suraj Caes MD      back and limb pain that 'sneaks up' and may transition into sickle crisis pain.          Last Assessment & Plan 7/3/2024 Office Visit Written 7/3/2024  3:32 PM by Suraj Case MD        Jennifer has a long history of an uncertain border between " sickle cell crisis pain and what she calls 'chronic pain.'   She continues to go to the ED. She feels there is ongoing question about separate diagnoses besides the sickle crisis. Taking pain meds, muscle relaxer, hot showers, laying on heating pads.  She is trying to get in for infusion today. She had to be put off her job because of pain.  There has been some fear and second-guessing about whether she is 'an addict,' including by her family.  We talked about how the two pains feel, as near as she can differentiate them.   -- Crisis pain feels excruciating like stabbing or lightning, can be at anywhere (arms, legs, back). Will worsen over time.  -- Back pain 'can creep up me,' more gradual onset, feels aching, burning, and pulling, and then often ends up with the same stabbing pain.  In 2019-20, she was taking gabapentin up to 900 TID, but stopped because her mom felt she was too somnolent (including at school). In retrospect, it isn't clear whether the gabapentin helped any aspect of her pain.       OBJECTIVE: Bilateral paralumbar hypertonicity, left more than right, no worsening on palpation, no evidence of trigger points.       ASSESSMENT: I'm sympathetic about the challenge of differentiating between crisis and other types of pain. In my experience, this is a not-uncommon trap that develops for some young adults with sickle cell. I believe she is experiencing:  (a) sickle crisis pain  (b) some chronic myofascial and/or fibromyalgia pain  (c) occasional transition from myofascial pain to crisis  Jennifer has always wbeen a fierce self-advocate, so she feels justified going to the ER (it was one of the first things she said to me, 3 years ago). I haven't been able to see her much, so I haven't been able to help her with this problem. Today she seems tired of the whole issue, and seemed pleased at the prospect of regular follow-up.       PLAN: Diagnostic trial of gabapentin 300mg PM, then 300mg twice daily, then 300  mg three times daily. Dose increase should be slow, and pull back if she becomes somnolent. The previous dose was high for her age and size at the time.  The gabapentin won't help her sickle cell pain at all, but it might help the 'sneaking up' pain (b, above) and perhaps the transitional pain (c, above)  RV in 1 month.          Relevant Medications     gabapentin (NEURONTIN) 300 MG capsule     Other Relevant Orders     Adult Mental Health  Referral    2. 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 Generalized anxiety disorder with panic attacks 5/3/2019     Last Assessment & Plan 7/3/2024 Office Visit Written 7/3/2024  3:43 PM by Suraj Case MD      Ongoing periodic anxiety, in part worsened by medical care, needing to go to ED, and confusion about next steps, being doubted by parents.  She asked for xanax, this was a medicine written by her previous PCP for anxiety, it helped.  She asked for help again finding a therapist.       ASSESSMENT and PLAN: With the existing stress, it sounds like she is also having PTSD. I don't feel comfortable about xanax, in part because of its eventual loss of benefit. However, I'm open to a temporary course as part of understanding the cause of her pain, see above.  Discussed about therapist, she agreed.           Relevant Medications     gabapentin (NEURONTIN) 300 MG capsule     Other Relevant Orders     Adult Mental Health  Referral    4. Nexplanon in place 2/11/2021     Last Assessment & Plan 7/3/2024 Office Visit Written 7/3/2024  3:46 PM by Suraj Case MD      Prev records referred to depo shot. Now she has an implantable.                              Time note (e5, 40'): The total of my time (on the date of service) for this service was >40 minutes, including discussion/face-to-face, chart review,  interpretation not otherwise reported, documentation, and updating of the computerized record.      Subjective   Jennifer is a 24 year old, presenting for the following:  Physical (Pt here or annual physical)        7/3/2024     9:10 AM   Additional Questions   Roomed by ADALID THOMAS            HPI        7/3/2024   General Health   How would you rate your overall physical health? (!) FAIR   Feel stress (tense, anxious, or unable to sleep) Only a little      (!) STRESS CONCERN      7/3/2024   Nutrition   Three or more servings of calcium each day? Yes   Diet: Regular (no restrictions)   How many servings of fruit and vegetables per day? 4 or more   How many sweetened beverages each day? 0-1            7/3/2024   Exercise   Days per week of moderate/strenous exercise 1 day      (!) EXERCISE CONCERN      7/3/2024   Social Factors   Frequency of gathering with friends or relatives Never   Worry food won't last until get money to buy more No   Food not last or not have enough money for food? No   Do you have housing? (Housing is defined as stable permanent housing and does not include staying ouside in a car, in a tent, in an abandoned building, in an overnight shelter, or couch-surfing.) Yes   Are you worried about losing your housing? No   Lack of transportation? No   Unable to get utilities (heat,electricity)? No      (!) SOCIAL CONNECTIONS CONCERN      7/3/2024   Dental   Dentist two times every year? Yes            7/3/2024   TB Screening   Were you born outside of the [place]? No          Today's PHQ-9 Score:       7/3/2024     8:35 AM   PHQ-9 SCORE   PHQ-9 Total Score MyChart 3 (Minimal depression)   PHQ-9 Total Score 3         7/3/2024   Substance Use   Alcohol more than 3/day or more than 7/wk Not Applicable   Do you use any other substances recreationally? No        Social History     Tobacco Use    Smoking status: Never     Passive exposure: Never    Smokeless tobacco: Never   Substance Use Topics    Alcohol use: Not  "Currently     Alcohol/week: 0.0 standard drinks of alcohol    Drug use: Never                    7/3/2024   One time HIV Screening   Previous HIV test? Yes          7/3/2024   STI Screening   New sexual partner(s) since last STI/HIV test? No              3/6/2023    11:21 AM   PAP / HPV   PAP Negative for Intraepithelial Lesion or Malignancy (NILM)            7/3/2024   Contraception/Family Planning   Questions about contraception or family planning No           Reviewed and updated as needed this visit by Provider                         Objective    Exam  /63 (BP Location: Left arm, Patient Position: Sitting, Cuff Size: Adult Regular)   Pulse 104   Wt 69.9 kg (154 lb)   SpO2 99%   BMI 26.43 kg/m     Estimated body mass index is 26.43 kg/m  as calculated from the following:    Height as of 6/28/24: 1.626 m (5' 4\").    Weight as of this encounter: 69.9 kg (154 lb).    Physical Exam          Signed Electronically by: Suraj Case MD    "

## 2024-07-03 NOTE — PROGRESS NOTES
Infusion Nursing Note:  Jennifer Cervantes presents today for add on IVF and pain meds.    Patient seen by provider today: No   present during visit today: Not Applicable.    Note: No labs/no provider visit. Patient reported pain 8/10 upon arrival. VSS. Given 1L LR over 2 hours, 8mg IVP Zofran, 50mg PO Benadryl, and 30mg IVP Toradol. Given 1mg IVP Dilaudid Q1 HR for max of 3 doses. Patient reported pain improvement of 6/10 upon discharge.    Intravenous Access:  Implanted Port.  +BR noted pre and post infusion  De-accessed prior to discharge    Treatment Conditions:  Met treatment protocol per MD/Triage.    Post Infusion Assessment:  Patient tolerated infusion without incident.     Discharge Plan:   Patient discharged in stable condition accompanied by: self.  Departure Mode: Ambulatory.      Allison Wiggins RN

## 2024-07-03 NOTE — ASSESSMENT & PLAN NOTE
Ongoing periodic anxiety, in part worsened by medical care, needing to go to ED, and confusion about next steps, being doubted by parents.  She asked for xanax, this was a medicine written by her previous PCP for anxiety, it helped.  She asked for help again finding a therapist.       ASSESSMENT and PLAN: With the existing stress, it sounds like she is also having PTSD. I don't feel comfortable about xanax, in part because of its eventual loss of benefit. However, I'm open to a temporary course as part of understanding the cause of her pain, see above.  Discussed about therapist, she agreed.

## 2024-07-03 NOTE — TELEPHONE ENCOUNTER
Oncology Nurse Triage - Sickle Cell Pain Crisis:    Situation: Jennifer  calling about Sickle Cell Pain Crisis, requesting to be added on for IV fluids and pain medicine    Background:     Patient's last infusion was 7/1/24  Last clinic visit date:6/19/24 [facility] Nascimento   Does patient have active treatment plan?  Yes      Assessment of Symptoms:  Onset/Duration of symptoms: 2 day    Is it typical sickle cell pain? Yes   Location: generalized   Character: Sharp           Intensity: 8/10    Any radiation of pain, numbness, tingling, weakness, warmth, swelling, discoloration of arms or legs?No     Fever?No  (if yes max temperature recorded in last 24 hours):      Chest Pain Present: No     Shortness of breath: No     Other home therapies tried: HEAT/HEATING PAD and WARM BATH     Last home medication taken and when: oxycodone and ibuprofen at 6am     Any Refills Needed?: No     Does patient have transportation & length of time to get to clinic: Yes has an appointment at 9am with PCP can come in after that         Recommendations:     Placed on infusion call list     If you do not hear from the infusion center by 2pm then you will not be able to get in for an infusion today. If symptoms worsen while waiting for call back, and/or you experience fever, chills, SOB, chest pain, cough, n/v, dizziness, numbness, swelling, discoloration of extremities, then seek emergency evaluation in Emergency Department.     Please note, if you are late for your appt, you risk losing your infusion appt as it may delay another patient's infusion who arrived on time.

## 2024-07-03 NOTE — PATIENT INSTRUCTIONS
Instructions: gabapentin         - Please start with 300 mg at bedtime.       - After 3-5 days, add 300 mg in the morning       - After 3-5 days of 300 mg twice daily, add 300 mg at lunch       - Then hold for a few weeks at 300 mg three times daily          - Send me a message or get a video slot, to discuss whether we should try increasing further.    Remember: we are trying to reduce the 'sneaking up on' pain, not the sickle stabbing/shocking pain.      Follow-up in person after 1-2 months. I will need to see how you are doing.

## 2024-07-03 NOTE — TELEPHONE ENCOUNTER
[id number] Jennifer called [place] [facility] [place]iring about infusion possibility before leaves the building.    0976 Offer from [facility] at 10:00am IVF/Pain, no labs. Pt in agreement  Scheduling request sent.

## 2024-07-03 NOTE — ASSESSMENT & PLAN NOTE
Jennifer has a long history of an uncertain border between sickle cell crisis pain and what she calls 'chronic pain.'   She continues to go to the ED. She feels there is ongoing question about separate diagnoses besides the sickle crisis. Taking pain meds, muscle relaxer, hot showers, laying on heating pads.  She is trying to get in for infusion today. She had to be put off her job because of pain.  There has been some fear and second-guessing about whether she is 'an addict,' including by her family.  We talked about how the two pains feel, as near as she can differentiate them.   -- Crisis pain feels excruciating like stabbing or lightning, can be at anywhere (arms, legs, back). Will worsen over time.  -- Back pain 'can creep up me,' more gradual onset, feels aching, burning, and pulling, and then often ends up with the same stabbing pain.  In 2019-20, she was taking gabapentin up to 900 TID, but stopped because her mom felt she was too somnolent (including at school). In retrospect, it isn't clear whether the gabapentin helped any aspect of her pain.       OBJECTIVE: Bilateral paralumbar hypertonicity, left more than right, no worsening on palpation, no evidence of trigger points.       ASSESSMENT: I'm sympathetic about the challenge of differentiating between crisis and other types of pain. In my experience, this is a not-uncommon trap that develops for some young adults with sickle cell. I believe she is experiencing:  (a) sickle crisis pain  (b) some chronic myofascial and/or fibromyalgia pain  (c) occasional transition from myofascial pain to crisis  Jennifer has always wbeen a fierce self-advocate, so she feels justified going to the ER (it was one of the first things she said to me, 3 years ago). I haven't been able to see her much, so I haven't been able to help her with this problem. Today she seems tired of the whole issue, and seemed pleased at the prospect of regular follow-up.       PLAN: Diagnostic  trial of gabapentin 300mg PM, then 300mg twice daily, then 300 mg three times daily. Dose increase should be slow, and pull back if she becomes somnolent. The previous dose was high for her age and size at the time.  The gabapentin won't help her sickle cell pain at all, but it might help the 'sneaking up' pain (b, above) and perhaps the transitional pain (c, above)  RV in 1 month.

## 2024-07-05 ENCOUNTER — HOSPITAL ENCOUNTER (EMERGENCY)
Facility: CLINIC | Age: 24
Discharge: HOME | End: 2024-07-05
Attending: EMERGENCY MEDICINE | Admitting: EMERGENCY MEDICINE
Payer: COMMERCIAL

## 2024-07-05 VITALS
DIASTOLIC BLOOD PRESSURE: 80 MMHG | HEART RATE: 68 BPM | SYSTOLIC BLOOD PRESSURE: 118 MMHG | RESPIRATION RATE: 16 BRPM | TEMPERATURE: 97.9 F | OXYGEN SATURATION: 97 %

## 2024-07-05 DIAGNOSIS — D57.00 SICKLE CELL PAIN CRISIS (H): ICD-10-CM

## 2024-07-05 LAB
ALBUMIN SERPL BCG-MCNC: 4.3 G/DL (ref 3.5–5.2)
ALP SERPL-CCNC: 63 U/L (ref 40–150)
ALT SERPL W P-5'-P-CCNC: 28 U/L (ref 0–50)
ANION GAP SERPL CALCULATED.3IONS-SCNC: 10 MMOL/L (ref 7–15)
AST SERPL W P-5'-P-CCNC: 46 U/L (ref 0–45)
BASOPHILS # BLD AUTO: 0.2 10E3/UL (ref 0–0.2)
BASOPHILS NFR BLD AUTO: 2 %
BILIRUB SERPL-MCNC: 2.9 MG/DL
BUN SERPL-MCNC: 7.2 MG/DL (ref 6–20)
CALCIUM SERPL-MCNC: 8.4 MG/DL (ref 8.6–10)
CHLORIDE SERPL-SCNC: 103 MMOL/L (ref 98–107)
CREAT SERPL-MCNC: 0.54 MG/DL (ref 0.51–0.95)
DEPRECATED HCO3 PLAS-SCNC: 23 MMOL/L (ref 22–29)
EGFRCR SERPLBLD CKD-EPI 2021: >90 ML/MIN/1.73M2
EOSINOPHIL # BLD AUTO: 0.7 10E3/UL (ref 0–0.7)
EOSINOPHIL NFR BLD AUTO: 5 %
ERYTHROCYTE [DISTWIDTH] IN BLOOD BY AUTOMATED COUNT: 24.7 % (ref 10–15)
GLUCOSE SERPL-MCNC: 102 MG/DL (ref 70–99)
HCG SERPL QL: NEGATIVE
HCT VFR BLD AUTO: 19.1 % (ref 35–47)
HGB BLD-MCNC: 6.8 G/DL (ref 11.7–15.7)
IMM GRANULOCYTES # BLD: 0.1 10E3/UL
IMM GRANULOCYTES NFR BLD: 1 %
LYMPHOCYTES # BLD AUTO: 2.8 10E3/UL (ref 0.8–5.3)
LYMPHOCYTES NFR BLD AUTO: 22 %
MCH RBC QN AUTO: 30.4 PG (ref 26.5–33)
MCHC RBC AUTO-ENTMCNC: 35.6 G/DL (ref 31.5–36.5)
MCV RBC AUTO: 85 FL (ref 78–100)
MONOCYTES # BLD AUTO: 1 10E3/UL (ref 0–1.3)
MONOCYTES NFR BLD AUTO: 8 %
NEUTROPHILS # BLD AUTO: 7.9 10E3/UL (ref 1.6–8.3)
NEUTROPHILS NFR BLD AUTO: 62 %
NRBC # BLD AUTO: 0.6 10E3/UL
NRBC BLD AUTO-RTO: 4 /100
PLATELET # BLD AUTO: 406 10E3/UL (ref 150–450)
POTASSIUM SERPL-SCNC: 3.5 MMOL/L (ref 3.4–5.3)
PROT SERPL-MCNC: 7 G/DL (ref 6.4–8.3)
RBC # BLD AUTO: 2.24 10E6/UL (ref 3.8–5.2)
RETICS # AUTO: 0.63 10E6/UL (ref 0.03–0.1)
RETICS/RBC NFR AUTO: 26.1 % (ref 0.5–2)
SODIUM SERPL-SCNC: 136 MMOL/L (ref 135–145)
WBC # BLD AUTO: 12.7 10E3/UL (ref 4–11)

## 2024-07-05 PROCEDURE — 84703 CHORIONIC GONADOTROPIN ASSAY: CPT | Performed by: EMERGENCY MEDICINE

## 2024-07-05 PROCEDURE — 85025 COMPLETE CBC W/AUTO DIFF WBC: CPT | Performed by: EMERGENCY MEDICINE

## 2024-07-05 PROCEDURE — 80053 COMPREHEN METABOLIC PANEL: CPT | Performed by: EMERGENCY MEDICINE

## 2024-07-05 PROCEDURE — 96375 TX/PRO/DX INJ NEW DRUG ADDON: CPT | Performed by: EMERGENCY MEDICINE

## 2024-07-05 PROCEDURE — 96361 HYDRATE IV INFUSION ADD-ON: CPT | Performed by: EMERGENCY MEDICINE

## 2024-07-05 PROCEDURE — 85045 AUTOMATED RETICULOCYTE COUNT: CPT | Performed by: EMERGENCY MEDICINE

## 2024-07-05 PROCEDURE — 99284 EMERGENCY DEPT VISIT MOD MDM: CPT | Mod: 25 | Performed by: EMERGENCY MEDICINE

## 2024-07-05 PROCEDURE — 36415 COLL VENOUS BLD VENIPUNCTURE: CPT | Performed by: EMERGENCY MEDICINE

## 2024-07-05 PROCEDURE — 96374 THER/PROPH/DIAG INJ IV PUSH: CPT | Performed by: EMERGENCY MEDICINE

## 2024-07-05 PROCEDURE — 258N000003 HC RX IP 258 OP 636: Performed by: EMERGENCY MEDICINE

## 2024-07-05 PROCEDURE — 96376 TX/PRO/DX INJ SAME DRUG ADON: CPT | Performed by: EMERGENCY MEDICINE

## 2024-07-05 PROCEDURE — 250N000011 HC RX IP 250 OP 636: Performed by: EMERGENCY MEDICINE

## 2024-07-05 PROCEDURE — 99285 EMERGENCY DEPT VISIT HI MDM: CPT | Performed by: EMERGENCY MEDICINE

## 2024-07-05 RX ORDER — ONDANSETRON 2 MG/ML
8 INJECTION INTRAMUSCULAR; INTRAVENOUS ONCE
Status: COMPLETED | OUTPATIENT
Start: 2024-07-05 | End: 2024-07-05

## 2024-07-05 RX ORDER — HEPARIN SODIUM (PORCINE) LOCK FLUSH IV SOLN 100 UNIT/ML 100 UNIT/ML
5-10 SOLUTION INTRAVENOUS
Status: DISCONTINUED | OUTPATIENT
Start: 2024-07-05 | End: 2024-07-05 | Stop reason: HOSPADM

## 2024-07-05 RX ORDER — KETOROLAC TROMETHAMINE 30 MG/ML
30 INJECTION, SOLUTION INTRAMUSCULAR; INTRAVENOUS ONCE
Status: COMPLETED | OUTPATIENT
Start: 2024-07-05 | End: 2024-07-05

## 2024-07-05 RX ADMIN — KETOROLAC TROMETHAMINE 30 MG: 30 INJECTION, SOLUTION INTRAMUSCULAR at 03:06

## 2024-07-05 RX ADMIN — HYDROMORPHONE HYDROCHLORIDE 1 MG: 1 INJECTION, SOLUTION INTRAMUSCULAR; INTRAVENOUS; SUBCUTANEOUS at 03:06

## 2024-07-05 RX ADMIN — HYDROMORPHONE HYDROCHLORIDE 1 MG: 1 INJECTION, SOLUTION INTRAMUSCULAR; INTRAVENOUS; SUBCUTANEOUS at 05:28

## 2024-07-05 RX ADMIN — ONDANSETRON 8 MG: 2 INJECTION INTRAMUSCULAR; INTRAVENOUS at 03:07

## 2024-07-05 RX ADMIN — HYDROMORPHONE HYDROCHLORIDE 1 MG: 1 INJECTION, SOLUTION INTRAMUSCULAR; INTRAVENOUS; SUBCUTANEOUS at 04:16

## 2024-07-05 RX ADMIN — HEPARIN 5 ML: 100 SYRINGE at 06:12

## 2024-07-05 RX ADMIN — SODIUM CHLORIDE, POTASSIUM CHLORIDE, SODIUM LACTATE AND CALCIUM CHLORIDE 1000 ML: 600; 310; 30; 20 INJECTION, SOLUTION INTRAVENOUS at 02:55

## 2024-07-05 ASSESSMENT — ACTIVITIES OF DAILY LIVING (ADL)
ADLS_ACUITY_SCORE: 37
ADLS_ACUITY_SCORE: 35
ADLS_ACUITY_SCORE: 37
ADLS_ACUITY_SCORE: 37

## 2024-07-05 ASSESSMENT — ENCOUNTER SYMPTOMS: SICKLE CELL PAIN: 1

## 2024-07-05 NOTE — ED TRIAGE NOTES
Pt reporting sickle cell pain, pain to arms and lower back. Pain started around 0700 yesterday.

## 2024-07-05 NOTE — ED NOTES
Pt alert and ambulatory in room. Requesting cab ride home. O2 sats 97% on RA. PT d/c'd and cab ordered for pt.

## 2024-07-05 NOTE — ED TRIAGE NOTES
Triage Assessment (Adult)       Row Name 07/05/24 0204          Triage Assessment    Airway WDL WDL        Respiratory WDL    Respiratory WDL WDL        Skin Circulation/Temperature WDL    Skin Circulation/Temperature WDL WDL        Cardiac WDL    Cardiac WDL WDL        Peripheral/Neurovascular WDL    Peripheral Neurovascular WDL WDL        Cognitive/Neuro/Behavioral WDL    Cognitive/Neuro/Behavioral WDL WDL

## 2024-07-05 NOTE — ED PROVIDER NOTES
ED Provider Note  [facility]      History     Chief Complaint   Patient presents with    Sickle Cell Pain Crisis       Sickle Cell Pain Crisis    Jennifer Cervantes is a 24 year old female who has a history of sickle cell disease, presents the emergency department the chief complaint of sickle cell pain crisis.  Patient reports that she has pain in her bilateral lower back and arms consistent areas where she has had sickle cell pain before.  She denies any chest pain, fevers, intra-abdominal pain, vomiting.  She does endorse nausea associated with pain.  No other additional acute complaints at this time.    Past Medical History  Past Medical History:   Diagnosis Date    Anxiety     Bleeding disorder (H24)     Blood clotting disorder (H24)     Cerebral infarction (H) 2015    Cognitive developmental delay     low IQ. 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 side (H)     right hand contractures    Iron overload due to repeated red blood cell transfusions     Migraines     Multiple subsegmental pulmonary emboli without acute cor pulmonale (H) 02/01/2021    Oppositional defiant behavior     Presence of intrauterine contraceptive device 2/18/2020    Superficial venous thrombosis of arm, right 03/25/2021    Uncomplicated asthma      Past Surgical History:   Procedure Laterality Date    AS INSERT TUNNELED CV 2 CATH W/O PORT/PUMP      CHOLECYSTECTOMY      CV RIGHT HEART CATH MEASUREMENTS RECORDED N/A 11/18/2021    Procedure: Right Heart Cath;  Surgeon: Jackson Stauffer MD;  Location:  HEART CARDIAC CATH LAB    INSERT PORT VASCULAR ACCESS Left 4/21/2021    Procedure: INSERTION, VASCULAR ACCESS PORT (NOT SURE ON SIDE UNTIL REMOVAL);  Surgeon: Rajan More MD;  Location: [facility] OR    IR CHEST PORT PLACEMENT > 5 YRS OF AGE  4/21/2021    IR CVC NON TUNNEL LINE REMOVAL  5/6/2021     IR CVC NON TUNNEL PLACEMENT > 5 YRS  04/07/2020    IR CVC NON TUNNEL PLACEMENT > 5 YRS  4/30/2021    IR CVC NON TUNNEL PLACEMENT > 5 YRS  9/7/2022    IR PORT REMOVAL LEFT  4/21/2021    REMOVE PORT VASCULAR ACCESS Left 4/21/2021    Procedure: REMOVAL, VASCULAR ACCESS PORT LEFT;  Surgeon: Rajan More MD;  Location: [facility] OR    REPAIR TENDON ELBOW Right 10/02/2019    Procedure: Right Elbow Flexor Lengthening, Flexor Pronator Slide Of Wrist and Finger, Thumb Adductor Lengthening;  Surgeon: Anai Franco MD;  Location: [facility] OR    TONSILLECTOMY Bilateral 10/02/2019    Procedure: Bilateral Tonsillectomy;  Surgeon: Farhana Guy MD;  Location: [facility] OR    ZZC BREAST REDUCTION (INCLUDES LIPO) TIER 3 Bilateral 04/23/2019     acetaminophen (TYLENOL) 325 MG tablet  albuterol (PROAIR HFA/PROVENTIL HFA/VENTOLIN HFA) 108 (90 Base) MCG/ACT inhaler  albuterol (PROVENTIL) (2.5 MG/3ML) 0.083% neb solution  aspirin (ASA) 81 MG chewable tablet  budesonide-formoterol (SYMBICORT) 160-4.5 MCG/ACT Inhaler  deferasirox (JADENU) 360 MG tablet  deferiprone (FERRIPROX) 1000 MG TABS tablet  gabapentin (NEURONTIN) 300 MG capsule  hydroxyurea (HYDREA) 500 MG capsule  ibuprofen (ADVIL/MOTRIN) 600 MG tablet  ibuprofen (ADVIL/MOTRIN) 800 MG tablet  melatonin 5 MG tablet  methocarbamol (ROBAXIN) 750 MG tablet  naloxone (NARCAN) 4 MG/0.1ML nasal spray  naloxone (NARCAN) 4 MG/0.1ML nasal spray  omeprazole (PRILOSEC) 20 MG DR capsule  ondansetron (ZOFRAN ODT) 8 MG ODT tab  oxyCODONE IR (ROXICODONE) 10 MG tablet  EPINEPHrine (ANY BX GENERIC EQUIV) 0.3 MG/0.3ML injection 2-pack      Allergies   Allergen Reactions    Contrast Dye      Hives and breathing issues    Fish-Derived Products Hives    Seafood Hives    Adhesive Tape Hives     Primipore dressing causes hives    Gadolinium     Iodinated Contrast Media      Family History  Family History   Problem Relation Age of Onset    Sickle Cell Trait Mother     Hypertension Mother      Asthma Mother     Sickle Cell Trait Father     Glaucoma No family hx of     Macular Degeneration No family hx of     Diabetes No family hx of     Gout No family hx of      Social History   Social History     Tobacco Use    Smoking status: Never     Passive exposure: Never    Smokeless tobacco: Never   Substance Use Topics    Alcohol use: Not Currently     Alcohol/week: 0.0 standard drinks of alcohol    Drug use: Never      Past medical history, past surgical history, medications, allergies, family history, and social history were reviewed with the patient. No additional pertinent items.   A medically appropriate review of systems was performed with pertinent positives and negatives noted in the HPI, and all other systems negative.    Physical Exam   BP: 113/84  Pulse: 87  Temp: 97.9  F (36.6  C)  Resp: 16  SpO2: 96 %  Physical Exam  Vitals and nursing note reviewed.   Constitutional:       General: She is not in acute distress.     Appearance: Normal appearance. She is not diaphoretic.   HENT:      Head: Atraumatic.      Mouth/Throat:      Mouth: Mucous membranes are moist.   Eyes:      General: No scleral icterus.     Conjunctiva/sclera: Conjunctivae normal.   Cardiovascular:      Rate and Rhythm: Normal rate.      Heart sounds: Normal heart sounds.   Pulmonary:      Effort: No respiratory distress.      Breath sounds: Normal breath sounds.   Abdominal:      General: Abdomen is flat.   Musculoskeletal:      Cervical back: Neck supple.   Skin:     General: Skin is warm.      Findings: No rash.   Neurological:      Mental Status: She is alert and oriented to person, place, and time. Mental status is at baseline.      Comments: Contracted right hand (baseline)   Psychiatric:         Mood and Affect: Mood normal.         Behavior: Behavior normal.           ED Course, Procedures, & Data      Procedures                Results for orders placed or performed during the hospital encounter of 07/05/24   Comprehensive  metabolic panel     Status: Abnormal   Result Value Ref Range    Sodium 136 135 - 145 mmol/L    Potassium 3.5 3.4 - 5.3 mmol/L    Carbon Dioxide (CO2) 23 22 - 29 mmol/L    Anion Gap 10 7 - 15 mmol/L    Urea Nitrogen 7.2 6.0 - 20.0 mg/dL    Creatinine 0.54 0.51 - 0.95 mg/dL    GFR Estimate >90 >60 mL/min/1.73m2    Calcium 8.4 (L) 8.6 - 10.0 mg/dL    Chloride 103 98 - 107 mmol/L    Glucose 102 (H) 70 - 99 mg/dL    Alkaline Phosphatase 63 40 - 150 U/L    AST 46 (H) 0 - 45 U/L    ALT 28 0 - 50 U/L    Protein Total 7.0 6.4 - 8.3 g/dL    Albumin 4.3 3.5 - 5.2 g/dL    Bilirubin Total 2.9 (H) <=1.2 mg/dL   Reticulocyte count     Status: Abnormal   Result Value Ref Range    % Reticulocyte 26.1 (H) 0.5 - 2.0 %    Absolute Reticulocyte 0.626 (H) 0.025 - 0.095 10e6/uL   HCG qualitative pregnancy (blood)     Status: Normal   Result Value Ref Range    hCG Serum Qualitative Negative Negative   CBC with platelets and differential     Status: Abnormal   Result Value Ref Range    WBC Count 12.7 (H) 4.0 - 11.0 10e3/uL    RBC Count 2.24 (L) 3.80 - 5.20 10e6/uL    Hemoglobin 6.8 (LL) 11.7 - 15.7 g/dL    Hematocrit 19.1 (L) 35.0 - 47.0 %    MCV 85 78 - 100 fL    MCH 30.4 26.5 - 33.0 pg    MCHC 35.6 31.5 - 36.5 g/dL    RDW 24.7 (H) 10.0 - 15.0 %    Platelet Count 406 150 - 450 10e3/uL    % Neutrophils 62 %    % Lymphocytes 22 %    % Monocytes 8 %    % Eosinophils 5 %    % Basophils 2 %    % Immature Granulocytes 1 %    NRBCs per 100 WBC 4 (H) <1 /100    Absolute Neutrophils 7.9 1.6 - 8.3 10e3/uL    Absolute Lymphocytes 2.8 0.8 - 5.3 10e3/uL    Absolute Monocytes 1.0 0.0 - 1.3 10e3/uL    Absolute Eosinophils 0.7 0.0 - 0.7 10e3/uL    Absolute Basophils 0.2 0.0 - 0.2 10e3/uL    Absolute Immature Granulocytes 0.1 <=0.4 10e3/uL    Absolute NRBCs 0.6 10e3/uL   CBC with Platelets & Differential     Status: Abnormal    Narrative    The following orders were created for panel order CBC with Platelets & Differential.  Procedure                                Abnormality         Status                     ---------                               -----------         ------                     CBC with platelets and d...[[id number]]  Abnormal            Final result                 Please view results for these tests on the individual orders.     Medications   HYDROmorphone (DILAUDID) injection 1 mg (1 mg Intravenous $Given 7/5/24 0528)   ketorolac (TORADOL) injection 30 mg (30 mg Intravenous $Given 7/5/24 0306)   lactated ringers BOLUS 1,000 mL (0 mLs Intravenous Stopped 7/5/24 0524)   ondansetron (ZOFRAN) injection 8 mg (8 mg Intravenous $Given 7/5/24 0307)     Labs Ordered and Resulted from Time of ED Arrival to Time of ED Departure   COMPREHENSIVE METABOLIC PANEL - Abnormal       Result Value    Sodium 136      Potassium 3.5      Carbon Dioxide (CO2) 23      Anion Gap 10      Urea Nitrogen 7.2      Creatinine 0.54      GFR Estimate >90      Calcium 8.4 (*)     Chloride 103      Glucose 102 (*)     Alkaline Phosphatase 63      AST 46 (*)     ALT 28      Protein Total 7.0      Albumin 4.3      Bilirubin Total 2.9 (*)    RETICULOCYTE COUNT - Abnormal    % Reticulocyte 26.1 (*)     Absolute Reticulocyte 0.626 (*)    CBC WITH PLATELETS AND DIFFERENTIAL - Abnormal    WBC Count 12.7 (*)     RBC Count 2.24 (*)     Hemoglobin 6.8 (*)     Hematocrit 19.1 (*)     MCV 85      MCH 30.4      MCHC 35.6      RDW 24.7 (*)     Platelet Count 406      % Neutrophils 62      % Lymphocytes 22      % Monocytes 8      % Eosinophils 5      % Basophils 2      % Immature Granulocytes 1      NRBCs per 100 WBC 4 (*)     Absolute Neutrophils 7.9      Absolute Lymphocytes 2.8      Absolute Monocytes 1.0      Absolute Eosinophils 0.7      Absolute Basophils 0.2      Absolute Immature Granulocytes 0.1      Absolute NRBCs 0.6     HCG QUALITATIVE PREGNANCY - Normal    hCG Serum Qualitative Negative       No orders to display          Critical care was not performed.     Medical Decision  Making  The patient's presentation was of high complexity (a chronic illness severe exacerbation, progression, or side effect of treatment).    The patient's evaluation involved:  review of external note(s) from 1 sources (see separate area of note for details)  review of 3+ test result(s) ordered prior to this encounter (see separate area of note for details)  ordering and/or review of 3+ test(s) in this encounter (see separate area of note for details)    The patient's management necessitated high risk (a parenteral controlled substance).    Assessment & Plan      Jennifer Cervantes is a 24 year old female who has a history of sickle cell disease, presents the emergency department the chief complaint of sickle cell pain crisis.  Patient is nontoxic-appearing on exam, has vital signs within normal limits.  She has no concerning features of acute chest crisis, and no red flag symptoms related to back pain including no lower extremity weakness, numbness, saddle anesthesia, urinary retention, or bowel incontinence.  She has a care plan in place and was treated accordingly with Dilaudid, Toradol, IV fluids, Zofran, and workup with labs including a reticulocyte count.  Patient had reassuring workup today and improvement in symptoms with the regimen according to her care plan.  She was discharged with outpatient follow-up and return precautions.    I have reviewed the nursing notes. I have reviewed the findings, diagnosis, plan and need for follow up with the patient.    New Prescriptions    No medications on file       Final diagnoses:   Sickle cell pain crisis (H)       Jesus Payne MD  [facility] EMERGENCY DEPARTMENT  7/5/2024     Jesus Payne MD  07/05/24 0579

## 2024-07-05 NOTE — ED NOTES
PT sleepy at time of discharge. O2 sats 86-91% on RA. PT placed back on 1L O2 NC. Will allow rest until able to stay awake and RA sat WNL.

## 2024-07-08 ENCOUNTER — PATIENT OUTREACH (OUTPATIENT)
Dept: CARE COORDINATION | Facility: CLINIC | Age: 24
End: 2024-07-08
Payer: COMMERCIAL

## 2024-07-08 ENCOUNTER — INFUSION THERAPY VISIT (OUTPATIENT)
Dept: TRANSPLANT | Facility: CLINIC | Age: 24
End: 2024-07-08
Attending: PEDIATRICS
Payer: COMMERCIAL

## 2024-07-08 ENCOUNTER — NURSE TRIAGE (OUTPATIENT)
Dept: ONCOLOGY | Facility: CLINIC | Age: 24
End: 2024-07-08
Payer: COMMERCIAL

## 2024-07-08 VITALS
SYSTOLIC BLOOD PRESSURE: 122 MMHG | RESPIRATION RATE: 16 BRPM | OXYGEN SATURATION: 93 % | DIASTOLIC BLOOD PRESSURE: 80 MMHG | TEMPERATURE: 98.3 F | HEART RATE: 98 BPM

## 2024-07-08 DIAGNOSIS — G81.10 SPASTIC HEMIPLEGIA, UNSPECIFIED ETIOLOGY, UNSPECIFIED LATERALITY (H): ICD-10-CM

## 2024-07-08 DIAGNOSIS — D57.00 SICKLE CELL PAIN CRISIS (H): ICD-10-CM

## 2024-07-08 DIAGNOSIS — D57.00 SICKLE CELL PAIN CRISIS (H): Primary | ICD-10-CM

## 2024-07-08 PROCEDURE — 258N000003 HC RX IP 258 OP 636: Performed by: PEDIATRICS

## 2024-07-08 PROCEDURE — 250N000013 HC RX MED GY IP 250 OP 250 PS 637: Performed by: PEDIATRICS

## 2024-07-08 PROCEDURE — 96375 TX/PRO/DX INJ NEW DRUG ADDON: CPT

## 2024-07-08 PROCEDURE — 250N000011 HC RX IP 250 OP 636: Performed by: PEDIATRICS

## 2024-07-08 PROCEDURE — 96374 THER/PROPH/DIAG INJ IV PUSH: CPT

## 2024-07-08 PROCEDURE — 96361 HYDRATE IV INFUSION ADD-ON: CPT

## 2024-07-08 PROCEDURE — 96376 TX/PRO/DX INJ SAME DRUG ADON: CPT

## 2024-07-08 RX ORDER — KETOROLAC TROMETHAMINE 30 MG/ML
30 INJECTION, SOLUTION INTRAMUSCULAR; INTRAVENOUS ONCE
Status: CANCELLED
Start: 2024-10-01 | End: 2024-10-01

## 2024-07-08 RX ORDER — DIPHENHYDRAMINE HCL 25 MG
50 CAPSULE ORAL
Status: CANCELLED
Start: 2024-10-01

## 2024-07-08 RX ORDER — ONDANSETRON 2 MG/ML
8 INJECTION INTRAMUSCULAR; INTRAVENOUS EVERY 6 HOURS PRN
Status: DISCONTINUED | OUTPATIENT
Start: 2024-07-08 | End: 2024-07-08 | Stop reason: HOSPADM

## 2024-07-08 RX ORDER — HEPARIN SODIUM (PORCINE) LOCK FLUSH IV SOLN 100 UNIT/ML 100 UNIT/ML
5 SOLUTION INTRAVENOUS
Status: CANCELLED | OUTPATIENT
Start: 2024-10-01

## 2024-07-08 RX ORDER — DIPHENHYDRAMINE HCL 25 MG
50 CAPSULE ORAL
Status: COMPLETED | OUTPATIENT
Start: 2024-07-08 | End: 2024-07-08

## 2024-07-08 RX ORDER — ONDANSETRON 4 MG/1
8 TABLET, FILM COATED ORAL
Status: CANCELLED
Start: 2024-10-01

## 2024-07-08 RX ORDER — HEPARIN SODIUM,PORCINE 10 UNIT/ML
5 VIAL (ML) INTRAVENOUS
Status: CANCELLED | OUTPATIENT
Start: 2024-10-01

## 2024-07-08 RX ORDER — KETOROLAC TROMETHAMINE 30 MG/ML
30 INJECTION, SOLUTION INTRAMUSCULAR; INTRAVENOUS ONCE
Status: COMPLETED | OUTPATIENT
Start: 2024-07-08 | End: 2024-07-08

## 2024-07-08 RX ORDER — HEPARIN SODIUM (PORCINE) LOCK FLUSH IV SOLN 100 UNIT/ML 100 UNIT/ML
5 SOLUTION INTRAVENOUS
Status: DISCONTINUED | OUTPATIENT
Start: 2024-07-08 | End: 2024-07-08 | Stop reason: HOSPADM

## 2024-07-08 RX ORDER — OXYCODONE HYDROCHLORIDE 10 MG/1
10 TABLET ORAL EVERY 4 HOURS PRN
Qty: 15 TABLET | Refills: 0 | Status: SHIPPED | OUTPATIENT
Start: 2024-07-08 | End: 2024-07-15

## 2024-07-08 RX ORDER — ONDANSETRON 2 MG/ML
8 INJECTION INTRAMUSCULAR; INTRAVENOUS EVERY 6 HOURS PRN
Status: CANCELLED
Start: 2024-10-01

## 2024-07-08 RX ADMIN — SODIUM CHLORIDE, POTASSIUM CHLORIDE, SODIUM LACTATE AND CALCIUM CHLORIDE 1000 ML: 600; 310; 30; 20 INJECTION, SOLUTION INTRAVENOUS at 11:02

## 2024-07-08 RX ADMIN — Medication 5 ML: at 14:06

## 2024-07-08 RX ADMIN — HYDROMORPHONE HYDROCHLORIDE 1 MG: 1 INJECTION, SOLUTION INTRAMUSCULAR; INTRAVENOUS; SUBCUTANEOUS at 11:02

## 2024-07-08 RX ADMIN — HYDROMORPHONE HYDROCHLORIDE 1 MG: 1 INJECTION, SOLUTION INTRAMUSCULAR; INTRAVENOUS; SUBCUTANEOUS at 13:09

## 2024-07-08 RX ADMIN — ONDANSETRON 8 MG: 2 INJECTION INTRAMUSCULAR; INTRAVENOUS at 11:02

## 2024-07-08 RX ADMIN — DIPHENHYDRAMINE HYDROCHLORIDE 50 MG: 25 CAPSULE ORAL at 11:02

## 2024-07-08 RX ADMIN — KETOROLAC TROMETHAMINE 30 MG: 30 INJECTION, SOLUTION INTRAMUSCULAR; INTRAVENOUS at 11:02

## 2024-07-08 RX ADMIN — HYDROMORPHONE HYDROCHLORIDE 1 MG: 1 INJECTION, SOLUTION INTRAMUSCULAR; INTRAVENOUS; SUBCUTANEOUS at 12:05

## 2024-07-08 ASSESSMENT — PAIN SCALES - GENERAL: PAINLEVEL: SEVERE PAIN (7)

## 2024-07-08 NOTE — TELEPHONE ENCOUNTER
Oncology Nurse Triage - Sickle Cell Pain Crisis:  Situation: Jennifer  calling about Sickle Cell Pain Crisis, requesting to be added on for IV fluids and pain medicine    Background:   Patient's last infusion was 7/3/24  Last clinic visit date:6/19/24 with RONALDO Franco  Does patient have active treatment plan?  Yes    Assessment of Symptoms:  Onset/Duration of symptoms: 1 day    Is it typical sickle cell pain? Yes   Location: lower back  Character: Dull ache           Intensity: 7/10    Any radiation of pain, numbness, tingling, weakness, warmth, swelling, discoloration of arms or legs?No     Fever?No  Chest Pain Present: No   Shortness of breath: No     Other home therapies tried: HEAT/HEATING PAD and WARM BATH   Last home medication taken and when: oxy ibuprofen and muscle relaxant at 0600    Any Refills Needed?: Yes Completely out, no side effects, taking it Q 6H--pended up in separate refill encounter    Does patient have transportation & length of time to get to clinic: No 10 minutes    Recommendations:   If you do not hear from the infusion center by 2pm then you will not be able to get in for an infusion today. If symptoms worsen while waiting for call back, and/or you experience fever, chills, SOB, chest pain, cough, n/v, dizziness, numbness, swelling, discoloration of extremities, then seek emergency evaluation in Emergency Department.     Added to infusion wait list. Pt voiced understanding.    0723: BMT can offer apt at 1130  0737: Spoke with Jennifer and offered apt in BMT at 1130. Pt confirmed she can be here at 1130.  Updated infusion charge nurse.  Message sent to CCOD to schedule pt.     Routed to Care Team.

## 2024-07-08 NOTE — PROGRESS NOTES
"Infusion Nursing Note:  Jennifer Cervantes presents today for IV fluids and pain medications.    Patient seen by provider today: No   present during visit today: Not Applicable.    Note:   Pt complains of 7/10 pain that started in her back and now is \"all over.\"    Pt received a liter of LR over an hour.    Pt received 50 mg po benadryl, 8 mg IV zofran, 30 mg IV reglan. Pt received 1 mg dilaudid every hour for 3 doses (3 mg dilaudid total).    Prescription for oxycodone refilled and bought to patient by pharmacy.      Intravenous Access:  Implanted Port.    Treatment Conditions:  Not Applicable.    Discharge Plan:   Discharge instructions reviewed with: Patient.  Patient and/or family verbalized understanding of discharge instructions and all questions answered.  Patient discharged in stable condition accompanied by: self.  Departure Mode: Ambulatory.      Jamaica Napoles RN    "

## 2024-07-08 NOTE — PROGRESS NOTES
Social Work - Transportation  [facility]    Data/Intervention:  Patient Name: Jennifer Cervantes Goes By: Jennifer    /Age: 2000 (24 year old)  Referral From: Patient  Reason for Referral:  support requested for patient transportation needs for today's appointment.  Assessment:  called [employer] to arrange will call ride home. [employer] arranged ride with [employer]. Patient will need to call [phone] when ready for return ride home.  Plan: Patient is aware of the transportation plan.  available to assist with any other needs.    CARLOS Chavez,LGUDAY  Hematology/Oncology Social Worker  Phone:[phone] Pager: [phone]

## 2024-07-08 NOTE — TELEPHONE ENCOUNTER
Narcotic Refill Request  Person Requesting Refill:Jennifer    Medication(s) requested:  oxycodone 10 mg tablet  Last fill date and by whom:  7/1/24 by Patricia Mantilla CNP  What pain is the medication treating: sickle cell pain  How is the medication being taken?: trying to take Q 6H but it is written for Q4H  Does pt have enough for today? No  Is pain being adequately controlled on the current regimen?: Yes  Experiencing any side effects from medication?: No    Date of most recent appointment:  6/19/24 with RONALDO Franco  Any No Show Visits:No  Next appointment:   8/15/24 with Patricia Mantilla CNP     Reviewed: No Access    Routed/Paged provider: Routed to RONALDO Franco

## 2024-07-08 NOTE — NURSING NOTE
"Oncology Rooming Note    July 8, 2024 1:42 PM   Jennifer Cervantes is a 24 year old female who presents for:    Chief Complaint   Patient presents with    Infusion     Add on IV fluids and pain medications. History of sickle cell disease.     Initial Vitals: /80 (BP Location: Right arm, Patient Position: Sitting, Cuff Size: Adult Regular)   Pulse 98   Temp 98.3  F (36.8  C) (Oral)   Resp 16   SpO2 93%  Estimated body mass index is 26.43 kg/m  as calculated from the following:    Height as of 6/28/24: 1.626 m (5' 4\").    Weight as of 7/3/24: 69.9 kg (154 lb). There is no height or weight on file to calculate BSA.  Severe Pain (7) Comment: Data Unavailable   No LMP recorded. Patient has had an implant.  Allergies reviewed: Yes  Medications reviewed: Yes    Medications: MEDICATION REFILLS NEEDED TODAY. Provider was notified.  Pharmacy name entered into [facility]:    [facility] - [place], [place] - [address] [place] [address] [id number]  [facility] - [place], [place] - [address] [place] [address]    Frailty Screening:   Is the patient here for a new oncology consult visit in cancer care? 2. No      Clinical concerns: n/a       Jamaica Napoles RN              "

## 2024-07-10 ENCOUNTER — NURSE TRIAGE (OUTPATIENT)
Dept: ONCOLOGY | Facility: CLINIC | Age: 24
End: 2024-07-10
Payer: COMMERCIAL

## 2024-07-10 ENCOUNTER — PATIENT OUTREACH (OUTPATIENT)
Dept: CARE COORDINATION | Facility: CLINIC | Age: 24
End: 2024-07-10
Payer: COMMERCIAL

## 2024-07-10 ENCOUNTER — INFUSION THERAPY VISIT (OUTPATIENT)
Dept: TRANSPLANT | Facility: CLINIC | Age: 24
End: 2024-07-10
Attending: PEDIATRICS
Payer: COMMERCIAL

## 2024-07-10 VITALS
TEMPERATURE: 98.7 F | RESPIRATION RATE: 16 BRPM | SYSTOLIC BLOOD PRESSURE: 120 MMHG | HEART RATE: 97 BPM | OXYGEN SATURATION: 92 % | DIASTOLIC BLOOD PRESSURE: 84 MMHG

## 2024-07-10 DIAGNOSIS — G81.10 SPASTIC HEMIPLEGIA, UNSPECIFIED ETIOLOGY, UNSPECIFIED LATERALITY (H): ICD-10-CM

## 2024-07-10 DIAGNOSIS — D57.00 SICKLE CELL PAIN CRISIS (H): Primary | ICD-10-CM

## 2024-07-10 PROCEDURE — 96376 TX/PRO/DX INJ SAME DRUG ADON: CPT

## 2024-07-10 PROCEDURE — 258N000003 HC RX IP 258 OP 636: Performed by: PEDIATRICS

## 2024-07-10 PROCEDURE — 96374 THER/PROPH/DIAG INJ IV PUSH: CPT

## 2024-07-10 PROCEDURE — 96375 TX/PRO/DX INJ NEW DRUG ADDON: CPT

## 2024-07-10 PROCEDURE — 96361 HYDRATE IV INFUSION ADD-ON: CPT

## 2024-07-10 PROCEDURE — 250N000011 HC RX IP 250 OP 636: Performed by: PEDIATRICS

## 2024-07-10 PROCEDURE — 250N000013 HC RX MED GY IP 250 OP 250 PS 637: Performed by: PEDIATRICS

## 2024-07-10 RX ORDER — KETOROLAC TROMETHAMINE 30 MG/ML
30 INJECTION, SOLUTION INTRAMUSCULAR; INTRAVENOUS ONCE
Status: CANCELLED
Start: 2024-10-01 | End: 2024-10-01

## 2024-07-10 RX ORDER — HEPARIN SODIUM (PORCINE) LOCK FLUSH IV SOLN 100 UNIT/ML 100 UNIT/ML
5 SOLUTION INTRAVENOUS DAILY PRN
Status: DISCONTINUED | OUTPATIENT
Start: 2024-07-10 | End: 2024-07-10 | Stop reason: HOSPADM

## 2024-07-10 RX ORDER — HEPARIN SODIUM (PORCINE) LOCK FLUSH IV SOLN 100 UNIT/ML 100 UNIT/ML
5 SOLUTION INTRAVENOUS
Status: CANCELLED | OUTPATIENT
Start: 2024-10-01

## 2024-07-10 RX ORDER — DIPHENHYDRAMINE HCL 25 MG
50 CAPSULE ORAL
Status: CANCELLED
Start: 2024-10-01

## 2024-07-10 RX ORDER — HEPARIN SODIUM,PORCINE 10 UNIT/ML
5 VIAL (ML) INTRAVENOUS
Status: CANCELLED | OUTPATIENT
Start: 2024-10-01

## 2024-07-10 RX ORDER — KETOROLAC TROMETHAMINE 30 MG/ML
30 INJECTION, SOLUTION INTRAMUSCULAR; INTRAVENOUS ONCE
Status: COMPLETED | OUTPATIENT
Start: 2024-07-10 | End: 2024-07-10

## 2024-07-10 RX ORDER — DIPHENHYDRAMINE HCL 25 MG
50 CAPSULE ORAL
Status: COMPLETED | OUTPATIENT
Start: 2024-07-10 | End: 2024-07-10

## 2024-07-10 RX ORDER — ONDANSETRON 2 MG/ML
8 INJECTION INTRAMUSCULAR; INTRAVENOUS EVERY 6 HOURS PRN
Status: CANCELLED
Start: 2024-10-01

## 2024-07-10 RX ORDER — ONDANSETRON 2 MG/ML
8 INJECTION INTRAMUSCULAR; INTRAVENOUS EVERY 6 HOURS PRN
Status: DISCONTINUED | OUTPATIENT
Start: 2024-07-10 | End: 2024-07-10 | Stop reason: HOSPADM

## 2024-07-10 RX ORDER — ONDANSETRON 4 MG/1
8 TABLET, FILM COATED ORAL
Status: CANCELLED
Start: 2024-10-01

## 2024-07-10 RX ADMIN — HYDROMORPHONE HYDROCHLORIDE 1 MG: 1 INJECTION, SOLUTION INTRAMUSCULAR; INTRAVENOUS; SUBCUTANEOUS at 13:00

## 2024-07-10 RX ADMIN — HYDROMORPHONE HYDROCHLORIDE 1 MG: 1 INJECTION, SOLUTION INTRAMUSCULAR; INTRAVENOUS; SUBCUTANEOUS at 10:55

## 2024-07-10 RX ADMIN — ONDANSETRON 8 MG: 2 INJECTION INTRAMUSCULAR; INTRAVENOUS at 10:54

## 2024-07-10 RX ADMIN — Medication 5 ML: at 13:03

## 2024-07-10 RX ADMIN — DIPHENHYDRAMINE HYDROCHLORIDE 50 MG: 25 CAPSULE ORAL at 10:53

## 2024-07-10 RX ADMIN — KETOROLAC TROMETHAMINE 30 MG: 30 INJECTION, SOLUTION INTRAMUSCULAR; INTRAVENOUS at 10:54

## 2024-07-10 RX ADMIN — HYDROMORPHONE HYDROCHLORIDE 1 MG: 1 INJECTION, SOLUTION INTRAMUSCULAR; INTRAVENOUS; SUBCUTANEOUS at 11:58

## 2024-07-10 RX ADMIN — SODIUM CHLORIDE, POTASSIUM CHLORIDE, SODIUM LACTATE AND CALCIUM CHLORIDE 1000 ML: 600; 310; 30; 20 INJECTION, SOLUTION INTRAVENOUS at 10:47

## 2024-07-10 ASSESSMENT — PAIN SCALES - GENERAL: PAINLEVEL: SEVERE PAIN (7)

## 2024-07-10 NOTE — TELEPHONE ENCOUNTER
"Oncology Nurse Triage - Sickle Cell Pain Crisis:    Situation: Jennifer  calling about Sickle Cell Pain Crisis, requesting to be added on for IV fluids and pain medicine    Background:     Patient's last infusion was 07/08/24  Last clinic visit date:06/19/24 w/Daya Nascimento  Does patient have active treatment plan?  Yes      Assessment of Symptoms:  Onset/Duration of symptoms: 1 day    Is it typical sickle cell pain? Yes   Location: \"generalized\"  Character: Dull ache           Intensity: 7/10    Any radiation of pain, numbness, tingling, weakness, warmth, swelling, discoloration of arms or legs?No     Fever?No  (if yes max temperature recorded in last 24 hours):      Chest Pain Present: No     Shortness of breath: No     Other home therapies tried: HEAT/HEATING PAD and WARM BATH     Last home medication taken and when: 0600 ibuprofen    Any Refills Needed?: No     Does patient have transportation & length of time to get to clinic:          Recommendations:     If you do not hear from the infusion center by 2pm then you will not be able to get in for an infusion today. If symptoms worsen while waiting for call back, and/or you experience fever, chills, SOB, chest pain, cough, n/v, dizziness, numbness, swelling, discoloration of extremities, then seek emergency evaluation in Emergency Department.     Please note, if you are late for your appt, you risk losing your infusion appt as it may delay another patient's infusion who arrived on time.           "

## 2024-07-10 NOTE — TELEPHONE ENCOUNTER
Available appt at 11 with BMT. Call to pt to verify she is able to make appt. Pt confirming she can make appt.    Message sent to CCOD and SW to help with scheduling and transportation.

## 2024-07-10 NOTE — PROGRESS NOTES
Infusion Nursing Note:  Jennifer Cervantes presents today for add on infusion.    Patient seen by provider today: No   present during visit today: Not Applicable.    Note: Patient added on for IVF/pain medication. Patient reports generalized pain rated 7/10 for the past couple days. Denies chest pain, SOB, N/V, fever, weakness. Home medications and allergies reviewed. ONC toxicity assessment completed. Received 1L LR over 2 hours. Given benadryl 50 mg PO, Zofran 8 mg IVP, Toradol 30 mg IVP, and dilaudid 1 mg IVP every 1 hour for 3 doses. Patient reports some pain improvement following 3rd dose of dilaudid.       Intravenous Access:  Implanted Port.    Treatment Conditions:  Not Applicable.      Post Infusion Assessment:  Patient tolerated infusion without incident.  Blood return noted pre and post infusion.  Site patent and intact, free from redness, edema or discomfort.  No evidence of extravasations.       Discharge Plan:   Patient discharged in stable condition accompanied by: self.  Departure Mode: Ambulatory.      Jennifer Lai RN

## 2024-07-10 NOTE — PROGRESS NOTES
Social Work - Transportation  [facility]    Data/Intervention:  Patient Name: Jennifer Cervantes Goes By: Jennifer    /Age: 2000 (24 year old)    Referral From: [facility]  Reason for Referral:  support requested for patient transportation needs for today's appointment.  Assessment:  called [employer] to arrange ride through patient's insurance. [employer] arranged  for patient from home with [employer]. Patient will need to call [phone] when ready for return ride home.  Plan: Patient is aware of the transportation plan.  available to assist with any other needs.    CARLOS Chavez,LGUDAY  Hematology/Oncology Social Worker  Phone:[phone] Pager: [phone]

## 2024-07-12 ENCOUNTER — HOSPITAL ENCOUNTER (EMERGENCY)
Facility: CLINIC | Age: 24
Discharge: HOME | End: 2024-07-12
Attending: FAMILY MEDICINE | Admitting: FAMILY MEDICINE
Payer: COMMERCIAL

## 2024-07-12 VITALS
SYSTOLIC BLOOD PRESSURE: 103 MMHG | HEIGHT: 64 IN | DIASTOLIC BLOOD PRESSURE: 60 MMHG | BODY MASS INDEX: 26.69 KG/M2 | OXYGEN SATURATION: 97 % | HEART RATE: 89 BPM | TEMPERATURE: 97.7 F | WEIGHT: 156.3 LBS | RESPIRATION RATE: 16 BRPM

## 2024-07-12 DIAGNOSIS — D57.00 SICKLE CELL DISEASE WITH CRISIS (H): ICD-10-CM

## 2024-07-12 LAB
ALBUMIN SERPL BCG-MCNC: 4.6 G/DL (ref 3.5–5.2)
ALP SERPL-CCNC: 61 U/L (ref 40–150)
ALT SERPL W P-5'-P-CCNC: 27 U/L (ref 0–50)
ANION GAP SERPL CALCULATED.3IONS-SCNC: 10 MMOL/L (ref 7–15)
AST SERPL W P-5'-P-CCNC: 54 U/L (ref 0–45)
ATRIAL RATE - MUSE: 77 BPM
BASOPHILS # BLD AUTO: 0.2 10E3/UL (ref 0–0.2)
BASOPHILS NFR BLD AUTO: 2 %
BILIRUB SERPL-MCNC: 2.5 MG/DL
BUN SERPL-MCNC: 9.3 MG/DL (ref 6–20)
CALCIUM SERPL-MCNC: 9.3 MG/DL (ref 8.6–10)
CHLORIDE SERPL-SCNC: 105 MMOL/L (ref 98–107)
CREAT SERPL-MCNC: 0.52 MG/DL (ref 0.51–0.95)
DEPRECATED HCO3 PLAS-SCNC: 22 MMOL/L (ref 22–29)
DIASTOLIC BLOOD PRESSURE - MUSE: NORMAL MMHG
EGFRCR SERPLBLD CKD-EPI 2021: >90 ML/MIN/1.73M2
EOSINOPHIL # BLD AUTO: 0.4 10E3/UL (ref 0–0.7)
EOSINOPHIL NFR BLD AUTO: 4 %
ERYTHROCYTE [DISTWIDTH] IN BLOOD BY AUTOMATED COUNT: 26.4 % (ref 10–15)
GLUCOSE SERPL-MCNC: 89 MG/DL (ref 70–99)
HCT VFR BLD AUTO: 19.5 % (ref 35–47)
HGB BLD-MCNC: 6.9 G/DL (ref 11.7–15.7)
IMM GRANULOCYTES # BLD: 0.1 10E3/UL
IMM GRANULOCYTES NFR BLD: 1 %
INTERPRETATION ECG - MUSE: NORMAL
LYMPHOCYTES # BLD AUTO: 1.8 10E3/UL (ref 0.8–5.3)
LYMPHOCYTES NFR BLD AUTO: 17 %
MCH RBC QN AUTO: 30.5 PG (ref 26.5–33)
MCHC RBC AUTO-ENTMCNC: 35.4 G/DL (ref 31.5–36.5)
MCV RBC AUTO: 86 FL (ref 78–100)
MONOCYTES # BLD AUTO: 0.9 10E3/UL (ref 0–1.3)
MONOCYTES NFR BLD AUTO: 9 %
NEUTROPHILS # BLD AUTO: 7.3 10E3/UL (ref 1.6–8.3)
NEUTROPHILS NFR BLD AUTO: 67 %
NRBC # BLD AUTO: 0.6 10E3/UL
NRBC BLD AUTO-RTO: 5 /100
P AXIS - MUSE: 69 DEGREES
PLATELET # BLD AUTO: 447 10E3/UL (ref 150–450)
POTASSIUM SERPL-SCNC: 4.2 MMOL/L (ref 3.4–5.3)
PR INTERVAL - MUSE: 164 MS
PROT SERPL-MCNC: 7.6 G/DL (ref 6.4–8.3)
QRS DURATION - MUSE: 76 MS
QT - MUSE: 412 MS
QTC - MUSE: 466 MS
R AXIS - MUSE: 47 DEGREES
RBC # BLD AUTO: 2.26 10E6/UL (ref 3.8–5.2)
RETICS # AUTO: 0.51 10E6/UL (ref 0.03–0.1)
RETICS/RBC NFR AUTO: 22.8 % (ref 0.5–2)
SODIUM SERPL-SCNC: 137 MMOL/L (ref 135–145)
SYSTOLIC BLOOD PRESSURE - MUSE: NORMAL MMHG
T AXIS - MUSE: 21 DEGREES
VENTRICULAR RATE- MUSE: 77 BPM
WBC # BLD AUTO: 10.8 10E3/UL (ref 4–11)

## 2024-07-12 PROCEDURE — 96361 HYDRATE IV INFUSION ADD-ON: CPT | Performed by: FAMILY MEDICINE

## 2024-07-12 PROCEDURE — 96376 TX/PRO/DX INJ SAME DRUG ADON: CPT | Performed by: FAMILY MEDICINE

## 2024-07-12 PROCEDURE — 93005 ELECTROCARDIOGRAM TRACING: CPT | Performed by: FAMILY MEDICINE

## 2024-07-12 PROCEDURE — 85045 AUTOMATED RETICULOCYTE COUNT: CPT | Performed by: FAMILY MEDICINE

## 2024-07-12 PROCEDURE — 250N000011 HC RX IP 250 OP 636: Performed by: FAMILY MEDICINE

## 2024-07-12 PROCEDURE — 96375 TX/PRO/DX INJ NEW DRUG ADDON: CPT | Performed by: FAMILY MEDICINE

## 2024-07-12 PROCEDURE — 82040 ASSAY OF SERUM ALBUMIN: CPT | Performed by: FAMILY MEDICINE

## 2024-07-12 PROCEDURE — 36415 COLL VENOUS BLD VENIPUNCTURE: CPT | Performed by: FAMILY MEDICINE

## 2024-07-12 PROCEDURE — 96374 THER/PROPH/DIAG INJ IV PUSH: CPT | Performed by: FAMILY MEDICINE

## 2024-07-12 PROCEDURE — 99284 EMERGENCY DEPT VISIT MOD MDM: CPT | Performed by: FAMILY MEDICINE

## 2024-07-12 PROCEDURE — 85025 COMPLETE CBC W/AUTO DIFF WBC: CPT | Performed by: FAMILY MEDICINE

## 2024-07-12 PROCEDURE — 93010 ELECTROCARDIOGRAM REPORT: CPT | Performed by: FAMILY MEDICINE

## 2024-07-12 PROCEDURE — 250N000013 HC RX MED GY IP 250 OP 250 PS 637: Performed by: FAMILY MEDICINE

## 2024-07-12 PROCEDURE — 99284 EMERGENCY DEPT VISIT MOD MDM: CPT | Mod: 25 | Performed by: FAMILY MEDICINE

## 2024-07-12 PROCEDURE — 93005 ELECTROCARDIOGRAM TRACING: CPT | Mod: RTG

## 2024-07-12 PROCEDURE — 258N000003 HC RX IP 258 OP 636: Performed by: FAMILY MEDICINE

## 2024-07-12 RX ORDER — HEPARIN SODIUM (PORCINE) LOCK FLUSH IV SOLN 100 UNIT/ML 100 UNIT/ML
100 SOLUTION INTRAVENOUS ONCE
Status: COMPLETED | OUTPATIENT
Start: 2024-07-12 | End: 2024-07-12

## 2024-07-12 RX ORDER — DIPHENHYDRAMINE HCL 50 MG
50 CAPSULE ORAL ONCE
Status: COMPLETED | OUTPATIENT
Start: 2024-07-12 | End: 2024-07-12

## 2024-07-12 RX ORDER — ONDANSETRON 2 MG/ML
4 INJECTION INTRAMUSCULAR; INTRAVENOUS ONCE
Status: COMPLETED | OUTPATIENT
Start: 2024-07-12 | End: 2024-07-12

## 2024-07-12 RX ORDER — KETOROLAC TROMETHAMINE 30 MG/ML
30 INJECTION, SOLUTION INTRAMUSCULAR; INTRAVENOUS ONCE
Status: COMPLETED | OUTPATIENT
Start: 2024-07-12 | End: 2024-07-12

## 2024-07-12 RX ADMIN — DIPHENHYDRAMINE HYDROCHLORIDE 50 MG: 50 CAPSULE ORAL at 10:28

## 2024-07-12 RX ADMIN — SODIUM CHLORIDE, POTASSIUM CHLORIDE, SODIUM LACTATE AND CALCIUM CHLORIDE 1000 ML: 600; 310; 30; 20 INJECTION, SOLUTION INTRAVENOUS at 10:28

## 2024-07-12 RX ADMIN — HYDROMORPHONE HYDROCHLORIDE 1 MG: 1 INJECTION, SOLUTION INTRAMUSCULAR; INTRAVENOUS; SUBCUTANEOUS at 11:35

## 2024-07-12 RX ADMIN — ONDANSETRON 4 MG: 2 INJECTION INTRAMUSCULAR; INTRAVENOUS at 10:25

## 2024-07-12 RX ADMIN — KETOROLAC TROMETHAMINE 30 MG: 30 INJECTION, SOLUTION INTRAMUSCULAR at 10:25

## 2024-07-12 RX ADMIN — HYDROMORPHONE HYDROCHLORIDE 1 MG: 1 INJECTION, SOLUTION INTRAMUSCULAR; INTRAVENOUS; SUBCUTANEOUS at 10:27

## 2024-07-12 RX ADMIN — HEPARIN 100 UNITS: 100 SYRINGE at 14:50

## 2024-07-12 RX ADMIN — HYDROMORPHONE HYDROCHLORIDE 1 MG: 1 INJECTION, SOLUTION INTRAMUSCULAR; INTRAVENOUS; SUBCUTANEOUS at 12:45

## 2024-07-12 ASSESSMENT — ACTIVITIES OF DAILY LIVING (ADL)
ADLS_ACUITY_SCORE: 37

## 2024-07-12 NOTE — ED PROVIDER NOTES
[facility] [place] ([facility])    7/12/24      ED PROVIDER NOTE   History     Chief Complaint   Patient presents with    Abdominal Pain     States to have abd pain late last night.  States she has sickle cell and was taking her med to help it but is not helping.  Pt has Hx of CVA with right side paresis.     The history is provided by the patient and medical records.     Jennifer Cervantes is a 24 year old female with history of sickle cell disease, moderately severe restriction on PFTs, prior CVA, hepatic hemachromatosis, power port in place for infusions (implantation date 4/21/2021 by Dr. Jitendra Lyn) who presents to the emergency department with abdominal pain that started late last night.  She has tried her home medications but could not control her pain with this.     Epic records reviewed.  Patient had presented to the emergency department 1 week ago and endorsed abdominal pain at that time as well.  She was seen by Dr. Jesus Payne who performed workup with labs.  She was treated with Dilaudid, lactated Ringer's, Toradol and Zofran.    Patient has an EGD scheduled on 11/7/2024.    Past Medical History  Past Medical History:   Diagnosis Date    Anxiety     Bleeding disorder (H24)     Blood clotting disorder (H24)     Cerebral infarction (H) 2015    Cognitive developmental delay     low IQ. 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 side (H)     right hand contractures    Iron overload due to repeated red blood cell transfusions     Migraines     Multiple subsegmental pulmonary emboli without acute cor pulmonale (H) 02/01/2021    Oppositional defiant behavior     Presence of intrauterine contraceptive device 2/18/2020    Superficial venous thrombosis of arm, right 03/25/2021    Uncomplicated asthma      Past Surgical History:   Procedure Laterality Date    AS INSERT  TUNNELED CV 2 CATH W/O PORT/PUMP      CHOLECYSTECTOMY      CV RIGHT HEART CATH MEASUREMENTS RECORDED N/A 11/18/2021    Procedure: Right Heart Cath;  Surgeon: Jackson Stauffer MD;  Location: [facility]    INSERT PORT VASCULAR ACCESS Left 4/21/2021    Procedure: INSERTION, VASCULAR ACCESS PORT (NOT SURE ON SIDE UNTIL REMOVAL);  Surgeon: Rajan More MD;  Location: [facility] OR    IR CHEST PORT PLACEMENT > 5 YRS OF AGE  4/21/2021    IR CVC NON TUNNEL LINE REMOVAL  5/6/2021    IR CVC NON TUNNEL PLACEMENT > 5 YRS  04/07/2020    IR CVC NON TUNNEL PLACEMENT > 5 YRS  4/30/2021    IR CVC NON TUNNEL PLACEMENT > 5 YRS  9/7/2022    IR PORT REMOVAL LEFT  4/21/2021    REMOVE PORT VASCULAR ACCESS Left 4/21/2021    Procedure: REMOVAL, VASCULAR ACCESS PORT LEFT;  Surgeon: Rajan More MD;  Location: [facility] OR    REPAIR TENDON ELBOW Right 10/02/2019    Procedure: Right Elbow Flexor Lengthening, Flexor Pronator Slide Of Wrist and Finger, Thumb Adductor Lengthening;  Surgeon: Anai Franco MD;  Location: [facility] OR    TONSILLECTOMY Bilateral 10/02/2019    Procedure: Bilateral Tonsillectomy;  Surgeon: Farhana Guy MD;  Location: [facility] OR    ZZC BREAST REDUCTION (INCLUDES LIPO) TIER 3 Bilateral 04/23/2019     acetaminophen (TYLENOL) 325 MG tablet  albuterol (PROAIR HFA/PROVENTIL HFA/VENTOLIN HFA) 108 (90 Base) MCG/ACT inhaler  albuterol (PROVENTIL) (2.5 MG/3ML) 0.083% neb solution  aspirin (ASA) 81 MG chewable tablet  budesonide-formoterol (SYMBICORT) 160-4.5 MCG/ACT Inhaler  deferasirox (JADENU) 360 MG tablet  deferiprone (FERRIPROX) 1000 MG TABS tablet  EPINEPHrine (ANY BX GENERIC EQUIV) 0.3 MG/0.3ML injection 2-pack  gabapentin (NEURONTIN) 300 MG capsule  hydroxyurea (HYDREA) 500 MG capsule  ibuprofen (ADVIL/MOTRIN) 600 MG tablet  ibuprofen (ADVIL/MOTRIN) 800 MG tablet  melatonin 5 MG tablet  methocarbamol (ROBAXIN) 750 MG tablet  naloxone (NARCAN) 4 MG/0.1ML nasal spray  naloxone (NARCAN) 4 MG/0.1ML  "nasal spray  omeprazole (PRILOSEC) 20 MG DR capsule  ondansetron (ZOFRAN ODT) 8 MG ODT tab  oxyCODONE IR (ROXICODONE) 10 MG tablet      Allergies   Allergen Reactions    Contrast Dye      Hives and breathing issues    Fish-Derived Products Hives    Seafood Hives    Adhesive Tape Hives     Primipore dressing causes hives    Gadolinium     Iodinated Contrast Media      Family History  Family History   Problem Relation Age of Onset    Sickle Cell Trait Mother     Hypertension Mother     Asthma Mother     Sickle Cell Trait Father     Glaucoma No family hx of     Macular Degeneration No family hx of     Diabetes No family hx of     Gout No family hx of      Social History   Social History     Tobacco Use    Smoking status: Never     Passive exposure: Never    Smokeless tobacco: Never   Substance Use Topics    Alcohol use: Not Currently     Alcohol/week: 0.0 standard drinks of alcohol    Drug use: Never      A medically appropriate review of systems was performed with pertinent positives and negatives noted in the HPI, and all other systems negative.    Physical Exam   BP: 123/77  Pulse: 89  Temp: 97.7  F (36.5  C)  Resp: 16  Height: 162.6 cm (5' 4\")  Weight: 70.9 kg (156 lb 4.8 oz)  SpO2: 97 %  Physical Exam  Constitutional:       General: She is not in acute distress.     Appearance: Normal appearance. She is not diaphoretic.   HENT:      Head: Atraumatic.      Mouth/Throat:      Mouth: Mucous membranes are moist.   Eyes:      General: No scleral icterus.     Conjunctiva/sclera: Conjunctivae normal.   Cardiovascular:      Rate and Rhythm: Normal rate.      Heart sounds: Normal heart sounds.   Pulmonary:      Effort: No respiratory distress.      Breath sounds: Normal breath sounds.   Abdominal:      General: Abdomen is flat.      Tenderness: There is abdominal tenderness. There is no guarding or rebound.   Musculoskeletal:      Cervical back: Neck supple.   Skin:     General: Skin is warm.      Findings: No rash. "   Neurological:      General: No focal deficit present.      Mental Status: She is alert and oriented to person, place, and time.      Sensory: No sensory deficit.      Motor: No weakness.      Coordination: Coordination normal.           ED Course, Procedures, & Data      Procedures            EKG Interpretation:      Interpreted by Jose Eduardo Rush MD  Time reviewed: 0929  Symptoms at time of EKG: abdominal pain   Rhythm: normal sinus   Rate: 77  Axis: normal  Ectopy: none  Conduction: normal  ST Segments/ T Waves: No ST-T wave changes  Q Waves: none  Comparison to prior: Similar to prior EKG from 6/28/2024    Clinical Impression: normal sinus rhythm, no acute ischemic changes       Results for orders placed or performed during the hospital encounter of 07/12/24   Comprehensive metabolic panel     Status: Abnormal   Result Value Ref Range    Sodium 137 135 - 145 mmol/L    Potassium 4.2 3.4 - 5.3 mmol/L    Carbon Dioxide (CO2) 22 22 - 29 mmol/L    Anion Gap 10 7 - 15 mmol/L    Urea Nitrogen 9.3 6.0 - 20.0 mg/dL    Creatinine 0.52 0.51 - 0.95 mg/dL    GFR Estimate >90 >60 mL/min/1.73m2    Calcium 9.3 8.6 - 10.0 mg/dL    Chloride 105 98 - 107 mmol/L    Glucose 89 70 - 99 mg/dL    Alkaline Phosphatase 61 40 - 150 U/L    AST 54 (H) 0 - 45 U/L    ALT 27 0 - 50 U/L    Protein Total 7.6 6.4 - 8.3 g/dL    Albumin 4.6 3.5 - 5.2 g/dL    Bilirubin Total 2.5 (H) <=1.2 mg/dL   Reticulocyte count     Status: Abnormal   Result Value Ref Range    % Reticulocyte 22.8 (H) 0.5 - 2.0 %    Absolute Reticulocyte 0.505 (H) 0.025 - 0.095 10e6/uL   CBC with platelets and differential     Status: Abnormal   Result Value Ref Range    WBC Count 10.8 4.0 - 11.0 10e3/uL    RBC Count 2.26 (L) 3.80 - 5.20 10e6/uL    Hemoglobin 6.9 (LL) 11.7 - 15.7 g/dL    Hematocrit 19.5 (L) 35.0 - 47.0 %    MCV 86 78 - 100 fL    MCH 30.5 26.5 - 33.0 pg    MCHC 35.4 31.5 - 36.5 g/dL    RDW 26.4 (H) 10.0 - 15.0 %    Platelet Count 447 150 - 450 10e3/uL    %  Neutrophils 67 %    % Lymphocytes 17 %    % Monocytes 9 %    % Eosinophils 4 %    % Basophils 2 %    % Immature Granulocytes 1 %    NRBCs per 100 WBC 5 (H) <1 /100    Absolute Neutrophils 7.3 1.6 - 8.3 10e3/uL    Absolute Lymphocytes 1.8 0.8 - 5.3 10e3/uL    Absolute Monocytes 0.9 0.0 - 1.3 10e3/uL    Absolute Eosinophils 0.4 0.0 - 0.7 10e3/uL    Absolute Basophils 0.2 0.0 - 0.2 10e3/uL    Absolute Immature Granulocytes 0.1 <=0.4 10e3/uL    Absolute NRBCs 0.6 10e3/uL   EKG 12-lead, tracing only     Status: None   Result Value Ref Range    Systolic Blood Pressure  mmHg    Diastolic Blood Pressure  mmHg    Ventricular Rate 77 BPM    Atrial Rate 77 BPM    NY Interval 164 ms    QRS Duration 76 ms     ms    QTc 466 ms    P Axis 69 degrees    R AXIS 47 degrees    T Axis 21 degrees    Interpretation ECG       Sinus rhythm  Normal ECG  Unconfirmed report - interpretation of this ECG is computer generated - see medical record for final interpretation  Confirmed by - EMERGENCY ROOM, PHYSICIAN ([id number]),  RACHEL CARRASQUILLO ([id number]) on 7/12/2024 9:34:24 PM     CBC with platelets differential     Status: Abnormal    Narrative    The following orders were created for panel order CBC with platelets differential.  Procedure                               Abnormality         Status                     ---------                               -----------         ------                     CBC with platelets and d...[[id number]]  Abnormal            Final result                 Please view results for these tests on the individual orders.     Medications   lactated ringers BOLUS 1,000 mL (0 mLs Intravenous Stopped 7/12/24 1433)   HYDROmorphone (DILAUDID) injection 1 mg (1 mg Intravenous $Given 7/12/24 1027)   ketorolac (TORADOL) injection 30 mg (30 mg Intravenous $Given 7/12/24 1025)   diphenhydrAMINE (BENADRYL) capsule 50 mg (50 mg Oral $Given 7/12/24 1028)   ondansetron (ZOFRAN) injection 4 mg (4 mg Intravenous $Given 7/12/24  1025)   HYDROmorphone (DILAUDID) injection 1 mg (1 mg Intravenous $Given 7/12/24 1135)   HYDROmorphone (DILAUDID) injection 1 mg (1 mg Intravenous $Given 7/12/24 1245)   heparin lock flush 100 unit/mL injection 100 Units (100 Units Intravenous $Given 7/12/24 2737)     Labs Ordered and Resulted from Time of ED Arrival to Time of ED Departure   COMPREHENSIVE METABOLIC PANEL - Abnormal       Result Value    Sodium 137      Potassium 4.2      Carbon Dioxide (CO2) 22      Anion Gap 10      Urea Nitrogen 9.3      Creatinine 0.52      GFR Estimate >90      Calcium 9.3      Chloride 105      Glucose 89      Alkaline Phosphatase 61      AST 54 (*)     ALT 27      Protein Total 7.6      Albumin 4.6      Bilirubin Total 2.5 (*)    RETICULOCYTE COUNT - Abnormal    % Reticulocyte 22.8 (*)     Absolute Reticulocyte 0.505 (*)    CBC WITH PLATELETS AND DIFFERENTIAL - Abnormal    WBC Count 10.8      RBC Count 2.26 (*)     Hemoglobin 6.9 (*)     Hematocrit 19.5 (*)     MCV 86      MCH 30.5      MCHC 35.4      RDW 26.4 (*)     Platelet Count 447      % Neutrophils 67      % Lymphocytes 17      % Monocytes 9      % Eosinophils 4      % Basophils 2      % Immature Granulocytes 1      NRBCs per 100 WBC 5 (*)     Absolute Neutrophils 7.3      Absolute Lymphocytes 1.8      Absolute Monocytes 0.9      Absolute Eosinophils 0.4      Absolute Basophils 0.2      Absolute Immature Granulocytes 0.1      Absolute NRBCs 0.6       No orders to display          Critical care was not performed.     Medical Decision Making  The patient's presentation was of moderate complexity (a chronic illness mild to moderate exacerbation, progression, or side effect of treatment).    The patient's evaluation involved:  ordering and/or review of 3+ test(s) in this encounter (see separate area of note for details)    The patient's management necessitated moderate risk (prescription drug management including medications given in the ED), high risk (a parenteral  controlled substance), and high risk (a decision regarding hospitalization).    Assessment & Plan        I have reviewed the nursing notes. I have reviewed the findings, diagnosis, plan and need for follow up with the patient.    Patient seen and evaluated for sickle cell crisis with specific pain plan patient received 3 doses of IV Dilaudid as well as Toradol and Zofran with marked improvement of symptoms and negative lab evaluation patient will be discharged to home with follow-up with outpatient providers.    Final diagnoses:   Sickle cell disease with crisis (H)       Jose Eduardo Rush MD  [facility] EMERGENCY DEPARTMENT  7/12/2024           Jose Eduardo Rush MD  07/16/24 [id number]

## 2024-07-12 NOTE — DISCHARGE INSTRUCTIONS
Discharged home continue with your current outpatient program and follow-up with hematology as scheduled.

## 2024-07-15 ENCOUNTER — NURSE TRIAGE (OUTPATIENT)
Dept: ONCOLOGY | Facility: CLINIC | Age: 24
End: 2024-07-15
Payer: COMMERCIAL

## 2024-07-15 ENCOUNTER — INFUSION THERAPY VISIT (OUTPATIENT)
Dept: TRANSPLANT | Facility: CLINIC | Age: 24
End: 2024-07-15
Attending: PEDIATRICS
Payer: COMMERCIAL

## 2024-07-15 VITALS
RESPIRATION RATE: 18 BRPM | HEART RATE: 118 BPM | TEMPERATURE: 98.4 F | DIASTOLIC BLOOD PRESSURE: 92 MMHG | SYSTOLIC BLOOD PRESSURE: 129 MMHG | OXYGEN SATURATION: 92 %

## 2024-07-15 DIAGNOSIS — G81.10 SPASTIC HEMIPLEGIA, UNSPECIFIED ETIOLOGY, UNSPECIFIED LATERALITY (H): ICD-10-CM

## 2024-07-15 DIAGNOSIS — D57.00 SICKLE CELL PAIN CRISIS (H): Primary | ICD-10-CM

## 2024-07-15 DIAGNOSIS — D57.00 SICKLE CELL PAIN CRISIS (H): ICD-10-CM

## 2024-07-15 PROCEDURE — 96361 HYDRATE IV INFUSION ADD-ON: CPT

## 2024-07-15 PROCEDURE — 258N000003 HC RX IP 258 OP 636: Performed by: PEDIATRICS

## 2024-07-15 PROCEDURE — 96376 TX/PRO/DX INJ SAME DRUG ADON: CPT

## 2024-07-15 PROCEDURE — 96374 THER/PROPH/DIAG INJ IV PUSH: CPT

## 2024-07-15 PROCEDURE — 250N000011 HC RX IP 250 OP 636: Performed by: PEDIATRICS

## 2024-07-15 PROCEDURE — 250N000013 HC RX MED GY IP 250 OP 250 PS 637: Performed by: PEDIATRICS

## 2024-07-15 PROCEDURE — 96375 TX/PRO/DX INJ NEW DRUG ADDON: CPT

## 2024-07-15 RX ORDER — ONDANSETRON 2 MG/ML
8 INJECTION INTRAMUSCULAR; INTRAVENOUS EVERY 6 HOURS PRN
Status: DISCONTINUED | OUTPATIENT
Start: 2024-07-15 | End: 2024-07-15 | Stop reason: HOSPADM

## 2024-07-15 RX ORDER — HEPARIN SODIUM,PORCINE 10 UNIT/ML
5 VIAL (ML) INTRAVENOUS
Status: CANCELLED | OUTPATIENT
Start: 2024-10-01

## 2024-07-15 RX ORDER — DIPHENHYDRAMINE HCL 25 MG
50 CAPSULE ORAL
Status: CANCELLED
Start: 2024-10-01

## 2024-07-15 RX ORDER — OXYCODONE HYDROCHLORIDE 10 MG/1
10 TABLET ORAL EVERY 4 HOURS PRN
Qty: 15 TABLET | Refills: 0 | Status: SHIPPED | OUTPATIENT
Start: 2024-07-15 | End: 2024-07-22

## 2024-07-15 RX ORDER — KETOROLAC TROMETHAMINE 30 MG/ML
30 INJECTION, SOLUTION INTRAMUSCULAR; INTRAVENOUS ONCE
Status: COMPLETED | OUTPATIENT
Start: 2024-07-15 | End: 2024-07-15

## 2024-07-15 RX ORDER — ONDANSETRON 2 MG/ML
8 INJECTION INTRAMUSCULAR; INTRAVENOUS EVERY 6 HOURS PRN
Status: CANCELLED
Start: 2024-10-01

## 2024-07-15 RX ORDER — ONDANSETRON 4 MG/1
8 TABLET, FILM COATED ORAL
Status: CANCELLED
Start: 2024-10-01

## 2024-07-15 RX ORDER — KETOROLAC TROMETHAMINE 30 MG/ML
30 INJECTION, SOLUTION INTRAMUSCULAR; INTRAVENOUS ONCE
Status: CANCELLED
Start: 2024-10-01 | End: 2024-10-01

## 2024-07-15 RX ORDER — DIPHENHYDRAMINE HCL 25 MG
50 CAPSULE ORAL
Status: COMPLETED | OUTPATIENT
Start: 2024-07-15 | End: 2024-07-15

## 2024-07-15 RX ORDER — HEPARIN SODIUM (PORCINE) LOCK FLUSH IV SOLN 100 UNIT/ML 100 UNIT/ML
5 SOLUTION INTRAVENOUS
Status: CANCELLED | OUTPATIENT
Start: 2024-10-01

## 2024-07-15 RX ADMIN — HYDROMORPHONE HYDROCHLORIDE 1 MG: 1 INJECTION, SOLUTION INTRAMUSCULAR; INTRAVENOUS; SUBCUTANEOUS at 12:12

## 2024-07-15 RX ADMIN — DIPHENHYDRAMINE HYDROCHLORIDE 50 MG: 25 CAPSULE ORAL at 11:09

## 2024-07-15 RX ADMIN — HYDROMORPHONE HYDROCHLORIDE 1 MG: 1 INJECTION, SOLUTION INTRAMUSCULAR; INTRAVENOUS; SUBCUTANEOUS at 11:09

## 2024-07-15 RX ADMIN — KETOROLAC TROMETHAMINE 30 MG: 30 INJECTION, SOLUTION INTRAMUSCULAR; INTRAVENOUS at 11:09

## 2024-07-15 RX ADMIN — SODIUM CHLORIDE, POTASSIUM CHLORIDE, SODIUM LACTATE AND CALCIUM CHLORIDE 1000 ML: 600; 310; 30; 20 INJECTION, SOLUTION INTRAVENOUS at 11:07

## 2024-07-15 RX ADMIN — HYDROMORPHONE HYDROCHLORIDE 1 MG: 1 INJECTION, SOLUTION INTRAMUSCULAR; INTRAVENOUS; SUBCUTANEOUS at 13:13

## 2024-07-15 ASSESSMENT — PAIN SCALES - GENERAL: PAINLEVEL: SEVERE PAIN (7)

## 2024-07-15 NOTE — PROGRESS NOTES
Infusion Nursing Note:  Jennifer Cervantes presents today for IVF and pain meds.    Patient seen by provider today: No   present during visit today: Not Applicable.    Note: Jennifer here feeling okay, having some stomach pains and generalized pain 7/10. Port accessed and pt received 1L LR bolus, 1mg x3 doses IV dilaudid, 50mg benadryl, and 30mg IV toradol. Pt tolerated infusion well, pain decreased to tolerable level on discharge.      Intravenous Access:  Implanted Port.    Treatment Conditions:  Not Applicable.      Post Infusion Assessment:  Patient tolerated infusion without incident.  Blood return noted pre and post infusion.  Site patent and intact, free from redness, edema or discomfort.  No evidence of extravasations.  Access discontinued per protocol.       Discharge Plan:   Patient discharged in stable condition accompanied by: self.  Departure Mode: Ambulatory.      Allison Bowman RN

## 2024-07-15 NOTE — TELEPHONE ENCOUNTER
Oncology Nurse Triage - Sickle Cell Pain Crisis:    Situation: Jennifer  calling about Sickle Cell Pain Crisis, requesting to be added on for IV fluids and pain medicine    Background:     Patient's last infusion was 7/12/2024  Last clinic visit date:6/19/2024 Daya Nascimento  Does patient have active treatment plan?  Yes      Assessment of Symptoms:  Onset/Duration of symptoms: 1 day    Is it typical sickle cell pain? Yes   Location: general all over  Character: throbbing           Intensity: 7/10    Any radiation of pain, numbness, tingling, weakness, warmth, swelling, discoloration of arms or legs?No     Fever?No    Chest Pain Present: No     Shortness of breath: No     Other home therapies tried: HEAT/HEATING PAD and WARM BATH     Last home medication taken and when: oxycodone and ibuprofen around 6:00am    Any Refills Needed?: Yes  Oxycodone  [address] [facility]    Does patient have transportation & length of time to get to clinic: No   Could get ride if early morning appt comes ups      Recommendations:   Meets protocol    Please note, if you are late for your appt, you risk losing your infusion appt as it may delay another patient's infusion who arrived on time.       Patient scheduled today at 10:30 am in BMT.  Called patient and advised of appt.

## 2024-07-15 NOTE — TELEPHONE ENCOUNTER
Narcotic Refill Request    Medication(s) requested:  oxycodone  Person Requesting Refill:Jennifer (pt)  What pain is the medication treating: sickle cell crisis pain   How is the medication being taken?:tries to take 1 tablet every 6hours  Does pt have enough for today? no  Is pain being adequately controlled on the current regimen?: ok, needs IVF/Pain infusions about 2 to 3x week.   Experiencing any side effects from medication?: denies    Date of most recent appointment:  6/19/2024 virtual with [facility] next provider visit with Patricia Mantilla CNP  Any No Show Visits:none recently  Next appointment:   8/15/2024. Patricia Mantilla   Last fill date and by whom:  Patricia Mantilla on 7/8/2024   Reviewed: yes    Send to provider: Patricia Mantilla CNP

## 2024-07-16 RX ORDER — HEPARIN SODIUM (PORCINE) LOCK FLUSH IV SOLN 100 UNIT/ML 100 UNIT/ML
5 SOLUTION INTRAVENOUS
Status: CANCELLED | OUTPATIENT
Start: 2024-07-17

## 2024-07-16 RX ORDER — HEPARIN SODIUM,PORCINE 10 UNIT/ML
5 VIAL (ML) INTRAVENOUS
Status: CANCELLED | OUTPATIENT
Start: 2024-07-17

## 2024-07-16 RX ORDER — DIPHENHYDRAMINE HYDROCHLORIDE 50 MG/ML
50 INJECTION INTRAMUSCULAR; INTRAVENOUS
Status: CANCELLED
Start: 2024-07-17

## 2024-07-16 RX ORDER — METHYLPREDNISOLONE SODIUM SUCCINATE 125 MG/2ML
125 INJECTION, POWDER, LYOPHILIZED, FOR SOLUTION INTRAMUSCULAR; INTRAVENOUS
Status: CANCELLED
Start: 2024-07-17

## 2024-07-16 RX ORDER — ALBUTEROL SULFATE 0.83 MG/ML
2.5 SOLUTION RESPIRATORY (INHALATION)
Status: CANCELLED | OUTPATIENT
Start: 2024-07-17

## 2024-07-16 RX ORDER — ALBUTEROL SULFATE 90 UG/1
1-2 AEROSOL, METERED RESPIRATORY (INHALATION)
Status: CANCELLED
Start: 2024-07-17

## 2024-07-16 RX ORDER — EPINEPHRINE 1 MG/ML
0.3 INJECTION, SOLUTION INTRAMUSCULAR; SUBCUTANEOUS EVERY 5 MIN PRN
Status: CANCELLED | OUTPATIENT
Start: 2024-07-17

## 2024-07-16 RX ORDER — MEPERIDINE HYDROCHLORIDE 25 MG/ML
25 INJECTION INTRAMUSCULAR; INTRAVENOUS; SUBCUTANEOUS EVERY 30 MIN PRN
Status: CANCELLED | OUTPATIENT
Start: 2024-07-17

## 2024-07-17 ENCOUNTER — APPOINTMENT (OUTPATIENT)
Dept: LAB | Facility: CLINIC | Age: 24
End: 2024-07-17
Attending: PEDIATRICS
Payer: COMMERCIAL

## 2024-07-17 ENCOUNTER — INFUSION THERAPY VISIT (OUTPATIENT)
Dept: ONCOLOGY | Facility: CLINIC | Age: 24
End: 2024-07-17
Attending: PEDIATRICS
Payer: COMMERCIAL

## 2024-07-17 VITALS
HEART RATE: 109 BPM | SYSTOLIC BLOOD PRESSURE: 130 MMHG | BODY MASS INDEX: 25.95 KG/M2 | WEIGHT: 151.2 LBS | TEMPERATURE: 97.8 F | DIASTOLIC BLOOD PRESSURE: 80 MMHG | RESPIRATION RATE: 18 BRPM | OXYGEN SATURATION: 94 %

## 2024-07-17 DIAGNOSIS — D57.00 SICKLE CELL PAIN CRISIS (H): Primary | ICD-10-CM

## 2024-07-17 DIAGNOSIS — G81.10 SPASTIC HEMIPLEGIA, UNSPECIFIED ETIOLOGY, UNSPECIFIED LATERALITY (H): ICD-10-CM

## 2024-07-17 DIAGNOSIS — D57.00 SICKLE CELL CRISIS (H): Primary | ICD-10-CM

## 2024-07-17 LAB
ABO/RH(D): NORMAL
ANION GAP SERPL CALCULATED.3IONS-SCNC: 12 MMOL/L (ref 7–15)
ANTIBODY SCREEN: NEGATIVE
BASOPHILS # BLD AUTO: 0.2 10E3/UL (ref 0–0.2)
BASOPHILS NFR BLD AUTO: 2 %
BUN SERPL-MCNC: 8 MG/DL (ref 6–20)
CALCIUM SERPL-MCNC: 9.4 MG/DL (ref 8.8–10.4)
CHLORIDE SERPL-SCNC: 107 MMOL/L (ref 98–107)
CREAT SERPL-MCNC: 0.53 MG/DL (ref 0.51–0.95)
EGFRCR SERPLBLD CKD-EPI 2021: >90 ML/MIN/1.73M2
EOSINOPHIL # BLD AUTO: 0.6 10E3/UL (ref 0–0.7)
EOSINOPHIL NFR BLD AUTO: 5 %
ERYTHROCYTE [DISTWIDTH] IN BLOOD BY AUTOMATED COUNT: 24.1 % (ref 10–15)
GLUCOSE SERPL-MCNC: 94 MG/DL (ref 70–99)
HCO3 SERPL-SCNC: 21 MMOL/L (ref 22–29)
HCT VFR BLD AUTO: 18.7 % (ref 35–47)
HGB BLD-MCNC: 6.7 G/DL (ref 11.7–15.7)
IMM GRANULOCYTES # BLD: 0.1 10E3/UL
IMM GRANULOCYTES NFR BLD: 1 %
LYMPHOCYTES # BLD AUTO: 1.7 10E3/UL (ref 0.8–5.3)
LYMPHOCYTES NFR BLD AUTO: 15 %
MCH RBC QN AUTO: 28.6 PG (ref 26.5–33)
MCHC RBC AUTO-ENTMCNC: 35.8 G/DL (ref 31.5–36.5)
MCV RBC AUTO: 80 FL (ref 78–100)
MONOCYTES # BLD AUTO: 1.2 10E3/UL (ref 0–1.3)
MONOCYTES NFR BLD AUTO: 10 %
NEUTROPHILS # BLD AUTO: 7.7 10E3/UL (ref 1.6–8.3)
NEUTROPHILS NFR BLD AUTO: 67 %
NRBC # BLD AUTO: 0.3 10E3/UL
NRBC BLD AUTO-RTO: 3 /100
PLATELET # BLD AUTO: 400 10E3/UL (ref 150–450)
POTASSIUM SERPL-SCNC: 3.8 MMOL/L (ref 3.4–5.3)
RBC # BLD AUTO: 2.34 10E6/UL (ref 3.8–5.2)
RETICS # AUTO: 0.52 10E6/UL (ref 0.03–0.1)
RETICS/RBC NFR AUTO: 23.2 % (ref 0.5–2)
SARS-COV-2 RNA RESP QL NAA+PROBE: NEGATIVE
SODIUM SERPL-SCNC: 140 MMOL/L (ref 135–145)
SPECIMEN EXPIRATION DATE: NORMAL
WBC # BLD AUTO: 11.5 10E3/UL (ref 4–11)

## 2024-07-17 PROCEDURE — 80048 BASIC METABOLIC PNL TOTAL CA: CPT | Performed by: PEDIATRICS

## 2024-07-17 PROCEDURE — 86923 COMPATIBILITY TEST ELECTRIC: CPT | Performed by: PEDIATRICS

## 2024-07-17 PROCEDURE — 85045 AUTOMATED RETICULOCYTE COUNT: CPT | Performed by: PEDIATRICS

## 2024-07-17 PROCEDURE — 96375 TX/PRO/DX INJ NEW DRUG ADDON: CPT

## 2024-07-17 PROCEDURE — 96361 HYDRATE IV INFUSION ADD-ON: CPT

## 2024-07-17 PROCEDURE — 96365 THER/PROPH/DIAG IV INF INIT: CPT

## 2024-07-17 PROCEDURE — 258N000003 HC RX IP 258 OP 636: Performed by: PEDIATRICS

## 2024-07-17 PROCEDURE — 87635 SARS-COV-2 COVID-19 AMP PRB: CPT | Performed by: REGISTERED NURSE

## 2024-07-17 PROCEDURE — 96413 CHEMO IV INFUSION 1 HR: CPT

## 2024-07-17 PROCEDURE — 250N000011 HC RX IP 250 OP 636: Performed by: PEDIATRICS

## 2024-07-17 PROCEDURE — 86900 BLOOD TYPING SEROLOGIC ABO: CPT | Performed by: REGISTERED NURSE

## 2024-07-17 PROCEDURE — 85025 COMPLETE CBC W/AUTO DIFF WBC: CPT | Performed by: PEDIATRICS

## 2024-07-17 PROCEDURE — 96376 TX/PRO/DX INJ SAME DRUG ADON: CPT

## 2024-07-17 PROCEDURE — 250N000013 HC RX MED GY IP 250 OP 250 PS 637: Performed by: PEDIATRICS

## 2024-07-17 PROCEDURE — 36591 DRAW BLOOD OFF VENOUS DEVICE: CPT | Performed by: PEDIATRICS

## 2024-07-17 RX ORDER — ONDANSETRON 4 MG/1
8 TABLET, FILM COATED ORAL
Status: CANCELLED
Start: 2024-10-01

## 2024-07-17 RX ORDER — HEPARIN SODIUM (PORCINE) LOCK FLUSH IV SOLN 100 UNIT/ML 100 UNIT/ML
5 SOLUTION INTRAVENOUS
Status: DISCONTINUED | OUTPATIENT
Start: 2024-07-17 | End: 2024-07-17 | Stop reason: HOSPADM

## 2024-07-17 RX ORDER — DIPHENHYDRAMINE HCL 25 MG
50 CAPSULE ORAL
Status: CANCELLED
Start: 2024-10-01

## 2024-07-17 RX ORDER — ONDANSETRON 2 MG/ML
8 INJECTION INTRAMUSCULAR; INTRAVENOUS EVERY 6 HOURS PRN
Status: CANCELLED
Start: 2024-10-01

## 2024-07-17 RX ORDER — DIPHENHYDRAMINE HCL 25 MG
50 CAPSULE ORAL
Status: COMPLETED | OUTPATIENT
Start: 2024-07-17 | End: 2024-07-17

## 2024-07-17 RX ORDER — HEPARIN SODIUM (PORCINE) LOCK FLUSH IV SOLN 100 UNIT/ML 100 UNIT/ML
5 SOLUTION INTRAVENOUS
Status: CANCELLED | OUTPATIENT
Start: 2024-10-01

## 2024-07-17 RX ORDER — HEPARIN SODIUM (PORCINE) LOCK FLUSH IV SOLN 100 UNIT/ML 100 UNIT/ML
5 SOLUTION INTRAVENOUS ONCE
Status: COMPLETED | OUTPATIENT
Start: 2024-07-17 | End: 2024-07-17

## 2024-07-17 RX ORDER — KETOROLAC TROMETHAMINE 30 MG/ML
30 INJECTION, SOLUTION INTRAMUSCULAR; INTRAVENOUS ONCE
Status: COMPLETED | OUTPATIENT
Start: 2024-07-17 | End: 2024-07-17

## 2024-07-17 RX ORDER — ONDANSETRON 2 MG/ML
8 INJECTION INTRAMUSCULAR; INTRAVENOUS EVERY 6 HOURS PRN
Status: DISCONTINUED | OUTPATIENT
Start: 2024-07-17 | End: 2024-07-17 | Stop reason: HOSPADM

## 2024-07-17 RX ORDER — KETOROLAC TROMETHAMINE 30 MG/ML
30 INJECTION, SOLUTION INTRAMUSCULAR; INTRAVENOUS ONCE
Status: CANCELLED
Start: 2024-10-01 | End: 2024-10-01

## 2024-07-17 RX ORDER — HEPARIN SODIUM,PORCINE 10 UNIT/ML
5 VIAL (ML) INTRAVENOUS
Status: CANCELLED | OUTPATIENT
Start: 2024-10-01

## 2024-07-17 RX ADMIN — DIPHENHYDRAMINE HYDROCHLORIDE 50 MG: 25 CAPSULE ORAL at 12:51

## 2024-07-17 RX ADMIN — HYDROMORPHONE HYDROCHLORIDE 1 MG: 1 INJECTION, SOLUTION INTRAMUSCULAR; INTRAVENOUS; SUBCUTANEOUS at 12:53

## 2024-07-17 RX ADMIN — Medication 5 ML: at 15:30

## 2024-07-17 RX ADMIN — HYDROMORPHONE HYDROCHLORIDE 1 MG: 1 INJECTION, SOLUTION INTRAMUSCULAR; INTRAVENOUS; SUBCUTANEOUS at 14:58

## 2024-07-17 RX ADMIN — KETOROLAC TROMETHAMINE 30 MG: 30 INJECTION, SOLUTION INTRAMUSCULAR; INTRAVENOUS at 12:56

## 2024-07-17 RX ADMIN — HYDROMORPHONE HYDROCHLORIDE 1 MG: 1 INJECTION, SOLUTION INTRAMUSCULAR; INTRAVENOUS; SUBCUTANEOUS at 13:55

## 2024-07-17 RX ADMIN — SODIUM CHLORIDE 343 MG: 9 INJECTION, SOLUTION INTRAVENOUS at 11:41

## 2024-07-17 RX ADMIN — SODIUM CHLORIDE, POTASSIUM CHLORIDE, SODIUM LACTATE AND CALCIUM CHLORIDE 1000 ML: 600; 310; 30; 20 INJECTION, SOLUTION INTRAVENOUS at 12:50

## 2024-07-17 RX ADMIN — Medication 5 ML: at 10:16

## 2024-07-17 RX ADMIN — ONDANSETRON 8 MG: 2 INJECTION INTRAMUSCULAR; INTRAVENOUS at 12:59

## 2024-07-17 RX ADMIN — SODIUM CHLORIDE 250 ML: 9 INJECTION, SOLUTION INTRAVENOUS at 11:41

## 2024-07-17 ASSESSMENT — PAIN SCALES - GENERAL: PAINLEVEL: SEVERE PAIN (7)

## 2024-07-17 NOTE — PROGRESS NOTES
Infusion Nursing Note:  Jennifer Cervantes presents today for Crizanlizumab, IV Fluids and Pain Medication.    Patient seen by provider today: No   present during visit today: Not Applicable.    Note: Patient presents to infusion today doing okay. Ongoing sickle cell pain rating 7/10 today. Declines needing any pain interventions at this time. Reports new dry cough and increased SOB over the last week. Denies any fevers, chills or chest pains. No swelling or other signs/symptoms of infection.     Hgb low at 6.7 today. Patricia Mantilla NP notified of the above.     TORB @ 1100 Patricia Mantilla NP/ Maritza Leonard, RN:  - Collect Covid Swab  - Have patient return in 2 days to recheck labs/possible transfusion  - If Hgb < 7 and symptomatic on Friday, should give blood   - OK to proceed with Jr today    Patient reporting sickle cell pain increased to 8/10 in hips and back while in infusion. Requesting to receive IV fluids/pain medications now. Patricia Mantilla NP notified.    TORB @ 1230 Patricia Mantilla NP/ Maritza Leonard, RN:  - OK to give IV fluids/pain meds but this would count as her second and final infusion for this week     Above relayed to patient. Patient verbalized understanding.     1L IVF, IV Dilaudid x3, IV Toradol, IV Zofran and PO Benadryl given per orders. Patient rated pain at 6/10 at time of discharge. Patient felt comfortable discharging home at this time.    Intravenous Access:  Implanted Port.    Treatment Conditions:  Lab Results   Component Value Date    HGB 6.7 (LL) 07/17/2024    WBC 11.5 (H) 07/17/2024    ANEU 8.6 (H) 06/25/2024    ANEUTAUTO 7.7 07/17/2024     07/17/2024        Lab Results   Component Value Date     07/17/2024    POTASSIUM 3.8 07/17/2024    MAG 2.0 05/16/2024    CR 0.53 07/17/2024    MICAH 9.4 07/17/2024    BILITOTAL 2.5 (H) 07/12/2024    ALBUMIN 4.6 07/12/2024    ALT 27 07/12/2024    AST 54 (H) 07/12/2024       Results reviewed, labs MET treatment parameters, ok to proceed  with treatment.  Biological Infusion Checklist:  ~~~ NOTE: If the patient answers yes to any of the questions below, hold the infusion and contact ordering provider or on-call provider.    Have you recently had an elevated temperature, fever, chills, productive cough, coughing for 3 weeks or longer or hemoptysis,  abnormal vital signs, night sweats,  chest pain or have you noticed a decrease in your appetite, unexplained weight loss or fatigue? No  Do you have any open wounds or new incisions? No  Do you have any upcoming hospitalizations or surgeries? Does not include esophagogastroduodenoscopy, colonoscopy, endoscopic retrograde cholangiopancreatography (ERCP), endoscopic ultrasound (EUS), dental procedures or joint aspiration/steroid injections No  Do you currently have any signs of illness or infection or are you on any antibiotics? Yes, desiree Mantilla NP aware  Have you had any new, sudden or worsening abdominal pain? No  Have you or anyone in your household received a live vaccination in the past 4 weeks? Please note: No live vaccines while on biologic/chemotherapy until 6 months after the last treatment. Patient can receive the flu vaccine (shot only), pneumovax and the Covid vaccine. It is optimal for the patient to get these vaccines mid cycle, but they can be given at any time as long as it is not on the day of the infusion. No  Have you recently been diagnosed with any new nervous system diseases (ie. Multiple sclerosis, Guillain [place], seizures, neurological changes) or cancer diagnosis? Are you on any form of radiation or chemotherapy? No  Are you pregnant or breast feeding or do you have plans of pregnancy in the future? No  Have you been having any signs of worsening depression or suicidal ideations?  (benlysta only) No  Have there been any other new onset medical symptoms? Yes, SOB and desiree Mantilla NP aware  Have you had any new blood clots? (IVIG only) No    Post Infusion  Assessment:  Patient tolerated infusion without incident.  Patient observed for 30 minutes post Crizanlizumab per protocol.  Blood return noted pre and post infusion.  Site patent and intact, free from redness, edema or discomfort.  No evidence of extravasations.  Access discontinued per protocol.     Discharge Plan:   Patient declined prescription refills.  Discharge instructions reviewed with: Patient.  Patient and/or family verbalized understanding of discharge instructions and all questions answered.  AVS to patient via [employer].  Patient will return 7/19 for next appointment for labs/possible transfusion. IB sent to scheduling. Patient aware.   Patient discharged in stable condition accompanied by: self.  Departure Mode: Ambulatory.      Vicenta Leonard RN

## 2024-07-17 NOTE — NURSING NOTE
Port accessed with 20 g 3/4 inch power needle by RN, labs collected, line flushed with saline and heparin.  Vitals taken. Pt checked in for appointment(s).     Radha Galindo RN

## 2024-07-17 NOTE — PATIENT INSTRUCTIONS
Contact Numbers  [facility]: [phone] (for symptom and scheduling needs)    Please call the [facility] Triage line if you experience a temperature greater than or equal to 100.4, shaking chills, have uncontrolled nausea, vomiting and/or diarrhea, dizziness, shortness of breath, chest pain, bleeding, unexplained bruising, or if you have any other new/concerning symptoms, questions or concerns.     If you are having any concerning symptoms or wish to speak to a provider before your next infusion visit, please call your care coordinator or triage to notify them so we can adequately serve you.     If you need a refill on a narcotic prescription or other medication, please call triage before your infusion appointment.           July 2024 Sunday Monday Tuesday Wednesday Thursday Friday Saturday        1    LAB CENTRAL  11:30 AM   (15 min.)   [facility] LAB DRAW   [facility]    BMT INFUSION 2 HR (120 MIN)  12:00 PM   (120 min.)    BMT INFUSION NURSE   [facility] Blood and Marrow Transplant [facility] 2     3    UM PHYSICAL   9:15 AM   (30 min.)   Suraj Case MD   [facility] Internal Medicine [place]    BMT INFUSION 3 HR (180 MIN)  10:00 AM   (180 min.)    BMT INFUSION NURSE   [facility] Blood and Marrow Transplant Program [place] 4     5    Admission   2:07 AM   Jesus Payne MD   [facility] Emergency Department   (Discharge: 7/5/2024) 6       7     8    BMT INFUSION 3 HR (180 MIN)  11:30 AM   (180 min.)    BMT INFUSION NURSE   [facility] Blood and Marrow Transplant [facility] 9     10    BMT INFUSION 3 HR (180 MIN)  11:00 AM   (180 min.)    BMT INFUSION NURSE   [facility] Blood and Marrow Transplant [facility] 11     12    Admission   9:15 AM   [facility] Emergency Department   (Discharge: 7/12/2024) 13       14     15    BMT INFUSION 2 HR (120 MIN)  10:30  AM   (120 min.)    BMT INFUSION NURSE   [facility] Blood and Marrow Transplant Program [place] 16     17    LAB CENTRAL   9:15 AM   (15 min.)    [facility] DRAW   [facility]    ONC INFUSION 4 HR (240 MIN)  10:30 AM   (240 min.)    ONC INFUSION NURSE   [facility] 18     19     20       21     22     23     24     25     26     27       28     29     30     31                                August 2024 Sunday Monday Tuesday Wednesday Thursday Friday Saturday                       1     2     3       4     5     6     7     8     9     10       11     12     13     14     15    LAB CENTRAL   9:30 AM   (15 min.)    [facility] LAB DRAW   [facility]    RETURN ACTIVE TREATMENT   9:45 AM   (45 min.)   Patricia Mantilla CNP   [facility]    ONC INFUSION 4 HR (240 MIN)  10:30 AM   (240 min.)    ONC INFUSION NURSE   [facility] 16     17       18     19     20     21     22     23     24       25     26     27     28     29     30     31                     Lab Results:  Recent Results (from the past 12 hour(s))   Basic metabolic panel    Collection Time: 07/17/24 10:12 AM   Result Value Ref Range    Sodium 140 135 - 145 mmol/L    Potassium 3.8 3.4 - 5.3 mmol/L    Chloride 107 98 - 107 mmol/L    Carbon Dioxide (CO2) 21 (L) 22 - 29 mmol/L    Anion Gap 12 7 - 15 mmol/L    Urea Nitrogen 8.0 6.0 - 20.0 mg/dL    Creatinine 0.53 0.51 - 0.95 mg/dL    GFR Estimate >90 >60 mL/min/1.73m2    Calcium 9.4 8.8 - 10.4 mg/dL    Glucose 94 70 - 99 mg/dL   Reticulocyte count    Collection Time: 07/17/24 10:12 AM   Result Value Ref Range    % Reticulocyte 23.2 (H) 0.5 - 2.0 %    Absolute Reticulocyte 0.520 (H) 0.025 - 0.095 10e6/uL   CBC with platelets and differential    Collection Time: 07/17/24 10:12 AM   Result Value Ref Range    WBC Count 11.5 (H) 4.0 - 11.0 10e3/uL    RBC Count  2.34 (L) 3.80 - 5.20 10e6/uL    Hemoglobin 6.7 (LL) 11.7 - 15.7 g/dL    Hematocrit 18.7 (L) 35.0 - 47.0 %    MCV 80 78 - 100 fL    MCH 28.6 26.5 - 33.0 pg    MCHC 35.8 31.5 - 36.5 g/dL    RDW 24.1 (H) 10.0 - 15.0 %    Platelet Count 400 150 - 450 10e3/uL    % Neutrophils 67 %    % Lymphocytes 15 %    % Monocytes 10 %    % Eosinophils 5 %    % Basophils 2 %    % Immature Granulocytes 1 %    NRBCs per 100 WBC 3 (H) <1 /100    Absolute Neutrophils 7.7 1.6 - 8.3 10e3/uL    Absolute Lymphocytes 1.7 0.8 - 5.3 10e3/uL    Absolute Monocytes 1.2 0.0 - 1.3 10e3/uL    Absolute Eosinophils 0.6 0.0 - 0.7 10e3/uL    Absolute Basophils 0.2 0.0 - 0.2 10e3/uL    Absolute Immature Granulocytes 0.1 <=0.4 10e3/uL    Absolute NRBCs 0.3 10e3/uL   Adult Type and Screen    Collection Time: 07/17/24 10:12 AM   Result Value Ref Range    ABO/RH(D) O POS     Antibody Screen Negative Negative    SPECIMEN EXPIRATION DATE [id number]    Symptomatic COVID-19 Virus (Coronavirus) by PCR Nose    Collection Time: 07/17/24 11:13 AM    Specimen: Nose; Swab   Result Value Ref Range    SARS CoV2 PCR Negative Negative

## 2024-07-18 LAB
ABO/RH(D): NORMAL
ANTIBODY SCREEN: NEGATIVE
BLD PROD TYP BPU: NORMAL
BLOOD COMPONENT TYPE: NORMAL
CODING SYSTEM: NORMAL
CROSSMATCH: NORMAL
SPECIMEN EXPIRATION DATE: NORMAL
UNIT ABO/RH: NORMAL
UNIT NUMBER: NORMAL
UNIT STATUS: NORMAL
UNIT TYPE ISBT: 5100

## 2024-07-18 RX ORDER — HEPARIN SODIUM (PORCINE) LOCK FLUSH IV SOLN 100 UNIT/ML 100 UNIT/ML
5 SOLUTION INTRAVENOUS
Status: CANCELLED | OUTPATIENT
Start: 2024-07-18

## 2024-07-18 RX ORDER — HEPARIN SODIUM,PORCINE 10 UNIT/ML
5 VIAL (ML) INTRAVENOUS
Status: CANCELLED | OUTPATIENT
Start: 2024-07-18

## 2024-07-19 ENCOUNTER — HOSPITAL ENCOUNTER (EMERGENCY)
Facility: CLINIC | Age: 24
Discharge: HOME | End: 2024-07-19
Attending: EMERGENCY MEDICINE | Admitting: EMERGENCY MEDICINE
Payer: COMMERCIAL

## 2024-07-19 ENCOUNTER — LAB (OUTPATIENT)
Dept: LAB | Facility: CLINIC | Age: 24
End: 2024-07-19
Attending: PEDIATRICS
Payer: COMMERCIAL

## 2024-07-19 ENCOUNTER — INFUSION THERAPY VISIT (OUTPATIENT)
Dept: ONCOLOGY | Facility: CLINIC | Age: 24
End: 2024-07-19
Attending: PEDIATRICS
Payer: COMMERCIAL

## 2024-07-19 ENCOUNTER — APPOINTMENT (OUTPATIENT)
Dept: CT IMAGING | Facility: CLINIC | Age: 24
End: 2024-07-19
Attending: EMERGENCY MEDICINE
Payer: COMMERCIAL

## 2024-07-19 VITALS
TEMPERATURE: 98 F | DIASTOLIC BLOOD PRESSURE: 72 MMHG | HEART RATE: 75 BPM | WEIGHT: 153 LBS | SYSTOLIC BLOOD PRESSURE: 119 MMHG | BODY MASS INDEX: 26.12 KG/M2 | RESPIRATION RATE: 16 BRPM | HEIGHT: 64 IN | OXYGEN SATURATION: 98 %

## 2024-07-19 VITALS
OXYGEN SATURATION: 95 % | HEART RATE: 84 BPM | RESPIRATION RATE: 16 BRPM | SYSTOLIC BLOOD PRESSURE: 117 MMHG | DIASTOLIC BLOOD PRESSURE: 70 MMHG | TEMPERATURE: 98 F

## 2024-07-19 VITALS
SYSTOLIC BLOOD PRESSURE: 118 MMHG | WEIGHT: 152.8 LBS | BODY MASS INDEX: 26.23 KG/M2 | RESPIRATION RATE: 16 BRPM | TEMPERATURE: 98.3 F | DIASTOLIC BLOOD PRESSURE: 64 MMHG | HEART RATE: 90 BPM

## 2024-07-19 DIAGNOSIS — Z86.73 HISTORY OF STROKE: ICD-10-CM

## 2024-07-19 DIAGNOSIS — R10.84 GENERALIZED ABDOMINAL PAIN: ICD-10-CM

## 2024-07-19 DIAGNOSIS — D57.1 HB-SS DISEASE WITHOUT CRISIS (H): Primary | ICD-10-CM

## 2024-07-19 DIAGNOSIS — D57.00 SICKLE CELL CRISIS (H): ICD-10-CM

## 2024-07-19 LAB
ALBUMIN SERPL BCG-MCNC: 4.6 G/DL (ref 3.5–5.2)
ALP SERPL-CCNC: 64 U/L (ref 40–150)
ALT SERPL W P-5'-P-CCNC: 27 U/L (ref 0–50)
ANION GAP SERPL CALCULATED.3IONS-SCNC: 11 MMOL/L (ref 7–15)
AST SERPL W P-5'-P-CCNC: 62 U/L (ref 0–45)
BASOPHILS # BLD AUTO: ABNORMAL 10*3/UL
BASOPHILS # BLD MANUAL: 0.4 10E3/UL (ref 0–0.2)
BASOPHILS NFR BLD AUTO: ABNORMAL %
BASOPHILS NFR BLD MANUAL: 3 %
BILIRUB SERPL-MCNC: 3.2 MG/DL
BUN SERPL-MCNC: 4.9 MG/DL (ref 6–20)
CALCIUM SERPL-MCNC: 8.7 MG/DL (ref 8.8–10.4)
CHLORIDE SERPL-SCNC: 104 MMOL/L (ref 98–107)
CREAT SERPL-MCNC: 0.51 MG/DL (ref 0.51–0.95)
EGFRCR SERPLBLD CKD-EPI 2021: >90 ML/MIN/1.73M2
EOSINOPHIL # BLD AUTO: ABNORMAL 10*3/UL
EOSINOPHIL # BLD MANUAL: 0.5 10E3/UL (ref 0–0.7)
EOSINOPHIL NFR BLD AUTO: ABNORMAL %
EOSINOPHIL NFR BLD MANUAL: 4 %
ERYTHROCYTE [DISTWIDTH] IN BLOOD BY AUTOMATED COUNT: 29.1 % (ref 10–15)
GLUCOSE SERPL-MCNC: 81 MG/DL (ref 70–99)
HCG SERPL QL: NEGATIVE
HCO3 SERPL-SCNC: 23 MMOL/L (ref 22–29)
HCT VFR BLD AUTO: 20.1 % (ref 35–47)
HGB BLD-MCNC: 7.2 G/DL (ref 11.7–15.7)
HOWELL-JOLLY BOD BLD QL SMEAR: PRESENT
IMM GRANULOCYTES # BLD: ABNORMAL 10*3/UL
IMM GRANULOCYTES NFR BLD: ABNORMAL %
LIPASE SERPL-CCNC: 34 U/L (ref 13–60)
LYMPHOCYTES # BLD AUTO: ABNORMAL 10*3/UL
LYMPHOCYTES # BLD MANUAL: 3.1 10E3/UL (ref 0.8–5.3)
LYMPHOCYTES NFR BLD AUTO: ABNORMAL %
LYMPHOCYTES NFR BLD MANUAL: 26 %
MCH RBC QN AUTO: 30.3 PG (ref 26.5–33)
MCHC RBC AUTO-ENTMCNC: 35.8 G/DL (ref 31.5–36.5)
MCV RBC AUTO: 85 FL (ref 78–100)
MONOCYTES # BLD AUTO: ABNORMAL 10*3/UL
MONOCYTES # BLD MANUAL: 0.5 10E3/UL (ref 0–1.3)
MONOCYTES NFR BLD AUTO: ABNORMAL %
MONOCYTES NFR BLD MANUAL: 4 %
NEUTROPHILS # BLD AUTO: ABNORMAL 10*3/UL
NEUTROPHILS # BLD MANUAL: 7.4 10E3/UL (ref 1.6–8.3)
NEUTROPHILS NFR BLD AUTO: ABNORMAL %
NEUTROPHILS NFR BLD MANUAL: 63 %
NRBC # BLD AUTO: 1.3 10E3/UL
NRBC # BLD AUTO: 2.1 10E3/UL
NRBC BLD AUTO-RTO: 11 /100
NRBC BLD MANUAL-RTO: 18 %
PLAT MORPH BLD: ABNORMAL
PLATELET # BLD AUTO: 431 10E3/UL (ref 150–450)
POLYCHROMASIA BLD QL SMEAR: ABNORMAL
POTASSIUM SERPL-SCNC: 3.9 MMOL/L (ref 3.4–5.3)
PROT SERPL-MCNC: 7.5 G/DL (ref 6.4–8.3)
RBC # BLD AUTO: 2.38 10E6/UL (ref 3.8–5.2)
RBC MORPH BLD: ABNORMAL
SICKLE CELLS BLD QL SMEAR: ABNORMAL
SODIUM SERPL-SCNC: 138 MMOL/L (ref 135–145)
TARGETS BLD QL SMEAR: ABNORMAL
WBC # BLD AUTO: 11.8 10E3/UL (ref 4–11)

## 2024-07-19 PROCEDURE — 82247 BILIRUBIN TOTAL: CPT | Performed by: EMERGENCY MEDICINE

## 2024-07-19 PROCEDURE — 85014 HEMATOCRIT: CPT

## 2024-07-19 PROCEDURE — 86900 BLOOD TYPING SEROLOGIC ABO: CPT

## 2024-07-19 PROCEDURE — 36591 DRAW BLOOD OFF VENOUS DEVICE: CPT

## 2024-07-19 PROCEDURE — 36415 COLL VENOUS BLD VENIPUNCTURE: CPT | Performed by: EMERGENCY MEDICINE

## 2024-07-19 PROCEDURE — 85007 BL SMEAR W/DIFF WBC COUNT: CPT

## 2024-07-19 PROCEDURE — 99285 EMERGENCY DEPT VISIT HI MDM: CPT | Mod: 25 | Performed by: EMERGENCY MEDICINE

## 2024-07-19 PROCEDURE — 83690 ASSAY OF LIPASE: CPT | Performed by: EMERGENCY MEDICINE

## 2024-07-19 PROCEDURE — 250N000011 HC RX IP 250 OP 636: Performed by: EMERGENCY MEDICINE

## 2024-07-19 PROCEDURE — 74176 CT ABD & PELVIS W/O CONTRAST: CPT

## 2024-07-19 PROCEDURE — 96376 TX/PRO/DX INJ SAME DRUG ADON: CPT | Performed by: EMERGENCY MEDICINE

## 2024-07-19 PROCEDURE — 99285 EMERGENCY DEPT VISIT HI MDM: CPT | Performed by: EMERGENCY MEDICINE

## 2024-07-19 PROCEDURE — 96375 TX/PRO/DX INJ NEW DRUG ADDON: CPT | Performed by: EMERGENCY MEDICINE

## 2024-07-19 PROCEDURE — 96374 THER/PROPH/DIAG INJ IV PUSH: CPT | Performed by: EMERGENCY MEDICINE

## 2024-07-19 PROCEDURE — 250N000011 HC RX IP 250 OP 636: Performed by: PEDIATRICS

## 2024-07-19 PROCEDURE — 258N000003 HC RX IP 258 OP 636: Performed by: EMERGENCY MEDICINE

## 2024-07-19 PROCEDURE — 84703 CHORIONIC GONADOTROPIN ASSAY: CPT | Performed by: EMERGENCY MEDICINE

## 2024-07-19 PROCEDURE — 96361 HYDRATE IV INFUSION ADD-ON: CPT | Performed by: EMERGENCY MEDICINE

## 2024-07-19 RX ORDER — ONDANSETRON 2 MG/ML
8 INJECTION INTRAMUSCULAR; INTRAVENOUS ONCE
Status: COMPLETED | OUTPATIENT
Start: 2024-07-19 | End: 2024-07-19

## 2024-07-19 RX ORDER — KETOROLAC TROMETHAMINE 30 MG/ML
30 INJECTION, SOLUTION INTRAMUSCULAR; INTRAVENOUS ONCE
Status: COMPLETED | OUTPATIENT
Start: 2024-07-19 | End: 2024-07-19

## 2024-07-19 RX ORDER — HEPARIN SODIUM (PORCINE) LOCK FLUSH IV SOLN 100 UNIT/ML 100 UNIT/ML
5 SOLUTION INTRAVENOUS ONCE
Status: COMPLETED | OUTPATIENT
Start: 2024-07-19 | End: 2024-07-19

## 2024-07-19 RX ORDER — HEPARIN SODIUM (PORCINE) LOCK FLUSH IV SOLN 100 UNIT/ML 100 UNIT/ML
100 SOLUTION INTRAVENOUS ONCE
Status: COMPLETED | OUTPATIENT
Start: 2024-07-19 | End: 2024-07-19

## 2024-07-19 RX ADMIN — HYDROMORPHONE HYDROCHLORIDE 1 MG: 1 INJECTION, SOLUTION INTRAMUSCULAR; INTRAVENOUS; SUBCUTANEOUS at 14:55

## 2024-07-19 RX ADMIN — HYDROMORPHONE HYDROCHLORIDE 1 MG: 1 INJECTION, SOLUTION INTRAMUSCULAR; INTRAVENOUS; SUBCUTANEOUS at 12:06

## 2024-07-19 RX ADMIN — Medication 5 ML: at 06:38

## 2024-07-19 RX ADMIN — KETOROLAC TROMETHAMINE 30 MG: 30 INJECTION, SOLUTION INTRAMUSCULAR at 12:05

## 2024-07-19 RX ADMIN — HYDROMORPHONE HYDROCHLORIDE 1 MG: 1 INJECTION, SOLUTION INTRAMUSCULAR; INTRAVENOUS; SUBCUTANEOUS at 13:32

## 2024-07-19 RX ADMIN — ONDANSETRON 8 MG: 2 INJECTION INTRAMUSCULAR; INTRAVENOUS at 12:05

## 2024-07-19 RX ADMIN — HEPARIN 100 UNITS: 100 SYRINGE at 15:44

## 2024-07-19 RX ADMIN — SODIUM CHLORIDE 1000 ML: 9 INJECTION, SOLUTION INTRAVENOUS at 12:04

## 2024-07-19 ASSESSMENT — ACTIVITIES OF DAILY LIVING (ADL)
ADLS_ACUITY_SCORE: 37
ADLS_ACUITY_SCORE: 35
ADLS_ACUITY_SCORE: 37

## 2024-07-19 NOTE — PATIENT INSTRUCTIONS
[facility] Triage and after hours / weekends / holidays:  [phone]    Please call the triage or after hours line if you experience a temperature greater than or equal to 100.4, shaking chills, have uncontrolled nausea, vomiting and/or diarrhea, dizziness, shortness of breath, chest pain, bleeding, unexplained bruising, or if you have any other new/concerning symptoms, questions or concerns.      If you are having any concerning symptoms or wish to speak to a provider before your next infusion visit, please call triage to notify them so we can adequately serve you.     If you need a refill on a narcotic prescription or other medication, please call before your infusion appointment.                 July 2024 Sunday Monday Tuesday Wednesday Thursday Friday Saturday        1    LAB CENTRAL  11:30 AM   (15 min.)   [facility] DRAW   [facility]    BMT INFUSION 2 HR (120 MIN)  12:00 PM   (120 min.)    BMT INFUSION NURSE   [facility] Blood and Marrow Transplant [facility] 2     3    UM PHYSICAL   9:15 AM   (30 min.)   Suraj Case MD   [facility] Internal Medicine [place]    BMT INFUSION 3 HR (180 MIN)  10:00 AM   (180 min.)    BMT INFUSION NURSE   [facility] Blood and Marrow Transplant [facility] 4     5    Admission   2:07 AM   Jesus Payne MD   [facility]   (Discharge: 7/5/2024) 6       7     8    BMT INFUSION 3 HR (180 MIN)  11:30 AM   (180 min.)    BMT INFUSION NURSE   [facility] Blood and Marrow Transplant [facility] 9     10    BMT INFUSION 3 HR (180 MIN)  11:00 AM   (180 min.)    BMT INFUSION NURSE   [facility] Blood and Marrow Transplant [facility] 11     12    Admission   9:15 AM   [facility]   (Discharge: 7/12/2024) 13       14     15    BMT INFUSION 2 HR (120 MIN)  10:30 AM   (120 min.)   [facility] B[facility]  INFUSION NURSE   [facility] Blood and Marrow Transplant Program [place] 16     17    LAB CENTRAL   9:15 AM   (15 min.)   [facility]RAW   [facility]    ONC INFUSION 4 HR (240 MIN)  10:30 AM   (240 min.)   [facility] ONC INFUSION NURSE   [facility] 18     19    LAB CENTRAL   7:00 AM   (15 min.)   [facility]   [facility]    ONC INFUSION 3 HR (180 MIN)   7:30 AM   (180 min.)   [facility] ONC INFUSION NURSE   [facility]    Admission  10:10 AM   [facility] Emergency Department 20       21     22     23     24     25     26     27       28     29     30     31                                August 2024 Sunday Monday Tuesday Wednesday Thursday Friday Saturday                       1     2     3       4     5     6     7     8     9     10       11     12     13     14     15    LAB CENTRAL   9:30 AM   (15 min.)    [facility] DRAW   [facility]    RETURN ACTIVE TREATMENT   9:45 AM   (45 min.)   Patricia Mantilla CNP   [facility]    ONC INFUSION 4 HR (240 MIN)  10:30 AM   (240 min.)    ONC INFUSION NURSE   [facility] 16     17       18     19     20     21     22     23     24       25     26     27     28     29     30     31                     Lab Results:  Recent Results (from the past 12 hour(s))   CBC with platelets and differential    Collection Time: 07/19/24  6:47 AM   Result Value Ref Range    WBC Count 11.8 (H) 4.0 - 11.0 10e3/uL    RBC Count 2.38 (L) 3.80 - 5.20 10e6/uL    Hemoglobin 7.2 (L) 11.7 - 15.7 g/dL    Hematocrit 20.1 (L) 35.0 - 47.0 %    MCV 85 78 - 100 fL    MCH 30.3 26.5 - 33.0 pg    MCHC 35.8 31.5 - 36.5 g/dL    RDW 29.1 (H) 10.0 - 15.0 %    Platelet Count 431 150 - 450 10e3/uL    % Neutrophils      % Lymphocytes      % Monocytes      % Eosinophils      % Basophils      %  Immature Granulocytes      NRBCs per 100 WBC 11 (H) <1 /100    Absolute Neutrophils      Absolute Lymphocytes      Absolute Monocytes      Absolute Eosinophils      Absolute Basophils      Absolute Immature Granulocytes      Absolute NRBCs 1.3 10e3/uL   Adult Type and Screen    Collection Time: 07/19/24  6:47 AM   Result Value Ref Range    ABO/RH(D) O POS     Antibody Screen Negative Negative    SPECIMEN EXPIRATION DATE [id number]    Manual Differential    Collection Time: 07/19/24  6:47 AM   Result Value Ref Range    % Neutrophils 63 %    % Lymphocytes 26 %    % Monocytes 4 %    % Eosinophils 4 %    % Basophils 3 %    NRBCs per 100 WBC 18 (H) <=0 %    Absolute Neutrophils 7.4 1.6 - 8.3 10e3/uL    Absolute Lymphocytes 3.1 0.8 - 5.3 10e3/uL    Absolute Monocytes 0.5 0.0 - 1.3 10e3/uL    Absolute Eosinophils 0.5 0.0 - 0.7 10e3/uL    Absolute Basophils 0.4 (H) 0.0 - 0.2 10e3/uL    Absolute NRBCs 2.1 (H) <=0.0 10e3/uL    RBC Morphology Confirmed RBC Indices     Platelet Assessment (A) Automated Count Confirmed. Platelet morphology is normal.     Automated Count Confirmed. Giant platelets are present.    [place] Bodies Present (A) None Seen    Polychromasia Moderate (A) None Seen    Sickle Cells Marked (A) None Seen    Target Cells Moderate (A) None Seen

## 2024-07-19 NOTE — PROGRESS NOTES
"Infusion Nursing Note:  Jennifer Cervantes presents today for possible transfusion-did not meet parameters .    Patient seen by provider today: No   present during visit today: Not Applicable.    Note: Patient presents to infusion with the following:  Continued cough for the past week. Pt states cough is dry and has not progressively gotten worse. Pt states with each cough, she has a sharp pain in her chest that occurs only during the coughing episode. Pt states cough drops haven't helped and cough medicine only helps for a short period of time. Pt states cough is keeping her up at night.   Continued shortness of breath for the past week. Pt states she got short of breath with the walk from lobby to [address]. Pt states she did not have to stop and rest. Pt states shortness of breath has not progressively gotten worse.   Patient denies new acute discomfort and states she is aware to go to the ER if pain is unmanageable over the weekend. Pt denies further s/s of infection such as fever, sore throat, body aches, chills, headache, increased nasal congestion, or changes in taste/smell. Patient denies s/s of low hemoglobin such as dizziness, lightheadedness, headache, appearing pale, or increased heart rate. Patient aware to seek medical attention if the above symptoms develop at home.    On-call Dr. Hernandez made aware of patients continued shortness of breath and cough symptoms   TORB. 7/19/24. 0811. Dr. Hernandez. Rik Ayers RN. \"If patient is agreeable, chest xray ordered\"    Pt made aware of Dr. Rivas recommendation for chest xray. Pt states she does not want to stay for chest xray to be scheduled. Pt is aware to seek medical assistance if cough/shortness of breath continues to get worse of any new s/s of infection develop at home.    Intravenous Access:  Implanted Port.    Treatment Conditions:  Lab Results   Component Value Date    HGB 7.2 (L) 07/19/2024    WBC 11.8 (H) 07/19/2024    ANEU 7.4 07/19/2024    " ANEUTAUTO 7.7 07/17/2024     07/19/2024        Results reviewed, labs did NOT meet treatment parameters per TORB below from 7/17/24: Hemoglobin greater then 7.0.  TORB @ 1100 Patricia Mantilla NP/ Maritza Leonard RN:  - If Hgb < 7 and symptomatic on Friday, should give blood      Post Infusion Assessment:  Access discontinued per protocol.       Discharge Plan:   Patient declined prescription refills.  Discharge instructions reviewed with: Patient.  Patient and/or family verbalized understanding of discharge instructions and all questions answered.  AVS to patient via [employer].  Patient will return 8/15 for next appointment.   Patient discharged in stable condition accompanied by: self.  Departure Mode: Ambulatory.      Rik Ayers RN

## 2024-07-19 NOTE — ED PROVIDER NOTES
ED Provider Note  [facility]      History     Chief Complaint   Patient presents with    Abdominal Pain     Pt has sickle cell. She states that the pain has been going on for months     HPI  Jennifer Cervantes is a 24 year old female with a history of sickle cell disease, moderately severe restriction on PFTs, prior CVA, hepatic hemachromatosis, power port in place for infusions (implantation date 4/21/2021 by Dr. Jitendra Lyn) who presents to the emergency department due to abdominal pain and vomiting.     Patient states she has had ongoing stomach pain for months and medication is not helping. Patient states this feels different from her typical sickle cell pain. Patient has sharp pain in the middle of her abdomen. Pain is constant and does not move. Patient tried oxycodone, ibuprofen, and a muscle relaxer without relief. Patient has been vomiting, last vomited this morning. Patient is scared to eat due to vomiting up food. Patient is having 1-2 normal bowel movements every day. Patient was at the infusion clinic this morning for blood transfusion, was found to have normal hemoglobin so was sent home. No alcohol use. Patient used to be on blood thinners.     Past Medical History  Past Medical History:   Diagnosis Date    Anxiety     Bleeding disorder (H24)     Blood clotting disorder (H24)     Cerebral infarction (H) 2015    Cognitive developmental delay     low IQ. 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 side (H)     right hand contractures    Iron overload due to repeated red blood cell transfusions     Migraines     Multiple subsegmental pulmonary emboli without acute cor pulmonale (H) 02/01/2021    Oppositional defiant behavior     Presence of intrauterine contraceptive device 2/18/2020    Superficial venous thrombosis of arm, right 03/25/2021    Uncomplicated  asthma      Past Surgical History:   Procedure Laterality Date    AS INSERT TUNNELED CV 2 CATH W/O PORT/PUMP      CHOLECYSTECTOMY      CV RIGHT HEART CATH MEASUREMENTS RECORDED N/A 11/18/2021    Procedure: Right Heart Cath;  Surgeon: Jackson Stauffer MD;  Location:  HEART CARDIAC CATH LAB    INSERT PORT VASCULAR ACCESS Left 4/21/2021    Procedure: INSERTION, VASCULAR ACCESS PORT (NOT SURE ON SIDE UNTIL REMOVAL);  Surgeon: Rajan More MD;  Location: [facility] OR    IR CHEST PORT PLACEMENT > 5 YRS OF AGE  4/21/2021    IR CVC NON TUNNEL LINE REMOVAL  5/6/2021    IR CVC NON TUNNEL PLACEMENT > 5 YRS  04/07/2020    IR CVC NON TUNNEL PLACEMENT > 5 YRS  4/30/2021    IR CVC NON TUNNEL PLACEMENT > 5 YRS  9/7/2022    IR PORT REMOVAL LEFT  4/21/2021    REMOVE PORT VASCULAR ACCESS Left 4/21/2021    Procedure: REMOVAL, VASCULAR ACCESS PORT LEFT;  Surgeon: Rajan More MD;  Location: [facility] OR    REPAIR TENDON ELBOW Right 10/02/2019    Procedure: Right Elbow Flexor Lengthening, Flexor Pronator Slide Of Wrist and Finger, Thumb Adductor Lengthening;  Surgeon: Anai Franco MD;  [facility]: [facility]    TONSILLECTOMY Bilateral 10/02/2019    Procedure: Bilateral Tonsillectomy;  Surgeon: Farhana Guy MD;  Location: [facility] OR    ZZC BREAST REDUCTION (INCLUDES LIPO) TIER 3 Bilateral 04/23/2019     acetaminophen (TYLENOL) 325 MG tablet  aspirin (ASA) 81 MG chewable tablet  budesonide-formoterol (SYMBICORT) 160-4.5 MCG/ACT Inhaler  deferasirox (JADENU) 360 MG tablet  deferiprone (FERRIPROX) 1000 MG TABS tablet  gabapentin (NEURONTIN) 300 MG capsule  hydroxyurea (HYDREA) 500 MG capsule  ibuprofen (ADVIL/MOTRIN) 800 MG tablet  melatonin 5 MG tablet  methocarbamol (ROBAXIN) 750 MG tablet  omeprazole (PRILOSEC) 20 MG DR capsule  ondansetron (ZOFRAN ODT) 8 MG ODT tab  oxyCODONE IR (ROXICODONE) 10 MG tablet  albuterol (PROAIR HFA/PROVENTIL HFA/VENTOLIN HFA) 108 (90 Base) MCG/ACT inhaler  albuterol (PROVENTIL) (2.5 MG/3ML) 0.083%  "neb solution  EPINEPHrine (ANY BX GENERIC EQUIV) 0.3 MG/0.3ML injection 2-pack  ibuprofen (ADVIL/MOTRIN) 600 MG tablet  naloxone (NARCAN) 4 MG/0.1ML nasal spray  naloxone (NARCAN) 4 MG/0.1ML nasal spray      Allergies   Allergen Reactions    Contrast Dye      Hives and breathing issues    Fish-Derived Products Hives    Seafood Hives    Adhesive Tape Hives     Primipore dressing causes hives    Gadolinium     Iodinated Contrast Media      Family History  Family History   Problem Relation Age of Onset    Sickle Cell Trait Mother     Hypertension Mother     Asthma Mother     Sickle Cell Trait Father     Glaucoma No family hx of     Macular Degeneration No family hx of     Diabetes No family hx of     Gout No family hx of      Social History   Social History     Tobacco Use    Smoking status: Never     Passive exposure: Never    Smokeless tobacco: Never   Substance Use Topics    Alcohol use: Not Currently     Alcohol/week: 0.0 standard drinks of alcohol    Drug use: Never      A medically appropriate review of systems was performed with pertinent positives and negatives noted in the HPI, and all other systems negative.    Physical Exam   BP: 116/73  Pulse: 77  Temp: 98.2  F (36.8  C)  Resp: 16  Height: 162.6 cm (5' 4\")  Weight: 69.4 kg (153 lb)  SpO2: 94 %  Physical Exam  Physical Exam   Constitutional: oriented to person, place, and time. appears well-developed and well-nourished.   HENT:   Head: Normocephalic and atraumatic.   Neck: Normal range of motion.   Pulmonary/Chest: Effort normal. No respiratory distress.   Cardiac: No murmurs, rubs, gallops. RRR.  Abdominal: Abdomen soft, nontender, nondistended. No rebound tenderness.  MSK: Long bones without deformity or evidence of trauma  Neurological: alert and oriented to person, place, and time.   Skin: Skin is warm and dry.   Psychiatric:  normal mood and affect.  behavior is normal. Thought content normal.       ED Course, Procedures, & Data      Procedures          "   Results for orders placed or performed during the hospital encounter of 07/19/24   CT Abdomen Pelvis w/o Contrast     Status: None (Preliminary result)    Narrative    CT ABDOMEN AND PELVIS WITHOUT CONTRAST  7/19/2024 2:15 PM    CLINICAL HISTORY: Abdominal pain.  Vomiting.     TECHNIQUE: CT scan of the abdomen and pelvis was performed without IV  contrast. Multiplanar reformats were obtained. Dose reduction  techniques were used.  CONTRAST: None.    COMPARISON: None.    FINDINGS:   LOWER CHEST: No infiltrates or effusions.    HEPATOBILIARY: No significant mass or bile duct dilatation.  Cholecystectomy.     PANCREAS: No significant mass, duct dilatation, or inflammatory  change.    SPLEEN: Calcified spleen presumably related to autoinfarction is  stable.    ADRENAL GLANDS: No significant nodules.    KIDNEYS/BLADDER: No significant mass, stones, or hydronephrosis.    BOWEL: No obstruction or inflammatory change.    VASCULATURE: No abdominal aortic aneurysm.    PELVIC ORGANS: No pelvic masses.    OTHER: No free air or free fluid.    MUSCULOSKELETAL: No frankly destructive bony lesions.      Impression    IMPRESSION:  No acute process demonstrated.   Comprehensive metabolic panel     Status: Abnormal   Result Value Ref Range    Sodium 138 135 - 145 mmol/L    Potassium 3.9 3.4 - 5.3 mmol/L    Carbon Dioxide (CO2) 23 22 - 29 mmol/L    Anion Gap 11 7 - 15 mmol/L    Urea Nitrogen 4.9 (L) 6.0 - 20.0 mg/dL    Creatinine 0.51 0.51 - 0.95 mg/dL    GFR Estimate >90 >60 mL/min/1.73m2    Calcium 8.7 (L) 8.8 - 10.4 mg/dL    Chloride 104 98 - 107 mmol/L    Glucose 81 70 - 99 mg/dL    Alkaline Phosphatase 64 40 - 150 U/L    AST 62 (H) 0 - 45 U/L    ALT 27 0 - 50 U/L    Protein Total 7.5 6.4 - 8.3 g/dL    Albumin 4.6 3.5 - 5.2 g/dL    Bilirubin Total 3.2 (H) <=1.2 mg/dL   Lipase     Status: Normal   Result Value Ref Range    Lipase 34 13 - 60 U/L   HCG qualitative pregnancy (blood)     Status: Normal   Result Value Ref Range    hCG  Serum Qualitative Negative Negative   Results for orders placed or performed in visit on 07/19/24   Prepare red blood cells (unit)     Status: None (Preliminary result)   Result Value Ref Range    Blood Component Type Red Blood Cells     Product Code [id number]     Unit Status Ready for issue     Unit Number [id number]     CROSSMATCH Compatible     CODING SYSTEM [id number]    Results for orders placed or performed in visit on 07/19/24   CBC with platelets and differential     Status: Abnormal   Result Value Ref Range    WBC Count 11.8 (H) 4.0 - 11.0 10e3/uL    RBC Count 2.38 (L) 3.80 - 5.20 10e6/uL    Hemoglobin 7.2 (L) 11.7 - 15.7 g/dL    Hematocrit 20.1 (L) 35.0 - 47.0 %    MCV 85 78 - 100 fL    MCH 30.3 26.5 - 33.0 pg    MCHC 35.8 31.5 - 36.5 g/dL    RDW 29.1 (H) 10.0 - 15.0 %    Platelet Count 431 150 - 450 10e3/uL    % Neutrophils      % Lymphocytes      % Monocytes      % Eosinophils      % Basophils      % Immature Granulocytes      NRBCs per 100 WBC 11 (H) <1 /100    Absolute Neutrophils      Absolute Lymphocytes      Absolute Monocytes      Absolute Eosinophils      Absolute Basophils      Absolute Immature Granulocytes      Absolute NRBCs 1.3 10e3/uL   Manual Differential     Status: Abnormal   Result Value Ref Range    % Neutrophils 63 %    % Lymphocytes 26 %    % Monocytes 4 %    % Eosinophils 4 %    % Basophils 3 %    NRBCs per 100 WBC 18 (H) <=0 %    Absolute Neutrophils 7.4 1.6 - 8.3 10e3/uL    Absolute Lymphocytes 3.1 0.8 - 5.3 10e3/uL    Absolute Monocytes 0.5 0.0 - 1.3 10e3/uL    Absolute Eosinophils 0.5 0.0 - 0.7 10e3/uL    Absolute Basophils 0.4 (H) 0.0 - 0.2 10e3/uL    Absolute NRBCs 2.1 (H) <=0.0 10e3/uL    RBC Morphology Confirmed RBC Indices     Platelet Assessment (A) Automated Count Confirmed. Platelet morphology is normal.     Automated Count Confirmed. Giant platelets are present.    White-Rosharon Bodies Present (A) None Seen    Polychromasia Moderate (A) None Seen    Sickle Cells Marked (A)  None Seen    Target Cells Moderate (A) None Seen   Adult Type and Screen     Status: None   Result Value Ref Range    ABO/RH(D) O POS     Antibody Screen Negative Negative    SPECIMEN EXPIRATION DATE [id number]    CBC with platelets differential     Status: Abnormal    Narrative    The following orders were created for panel order CBC with platelets differential.  Procedure                               Abnormality         Status                     ---------                               -----------         ------                     CBC with platelets and d...[[id number]]  Abnormal            Final result               Manual Differential[[id number]]          Abnormal            Final result                 Please view results for these tests on the individual orders.   ABO/Rh type and screen     Status: None    Narrative    The following orders were created for panel order ABO/Rh type and screen.  Procedure                               Abnormality         Status                     ---------                               -----------         ------                     Adult Type and Screen[[id number]]                            Final result                 Please view results for these tests on the individual orders.     Medications - No data to display  Labs Ordered and Resulted from Time of ED Arrival to Time of ED Departure - No data to display  No orders to display          Critical care was not performed.     Medical Decision Making  The patient's presentation was of high complexity (a chronic illness severe exacerbation, progression, or side effect of treatment).    The patient's evaluation involved:  review of external note(s) from 1 sources (hematology oncology note from today)  review of 1 test result(s) ordered prior to this encounter (CBC)  ordering and/or review of 3+ test(s) in this encounter (see separate area of note for details)    The patient's management necessitated high risk (a parenteral  controlled substance).    Assessment & Plan    MDM  Patient presented with abdominal pain.  This does seem to be chronic.  To do CT scan due to worsening pain however this is unremarkable.  Absent gallbladder, unlikely biliary process.  Labs here are reassuring.  White blood count is normal.  She has no fevers.  She does not have urinary symptoms that are very unlikely nephrolithiasis, cystitis, pyelonephritis.  After pain medication she does feel much better.  Denies any cardiorespiratory concerns today.  She will be discharged and discussed follow-up and return precautions.    I have reviewed the nursing notes. I have reviewed the findings, diagnosis, plan and need for follow up with the patient.    New Prescriptions    No medications on file       Final diagnoses:   Generalized abdominal pain   I, Pawel Phillips, am serving as a trained medical scribe to document services personally performed by Andrew Chou MD, based to the provider's statements to me.     IAndrew MD, was physically present and have reviewed and verified the accuracy of this note documented by Pawel Phillips.     Andrew Chou MD  [facility] EMERGENCY DEPARTMENT  7/19/2024     Andrew Chou MD  07/19/24 1500

## 2024-07-19 NOTE — DISCHARGE INSTRUCTIONS
Return to the Emergency Department if you have any further concerns    Please follow up with your PCP/gastroenterology for you abdominal pain

## 2024-07-19 NOTE — NURSING NOTE
Chief Complaint   Patient presents with    Port Draw     Labs drawn via port by RN in lab.      Port accessed with 20g flat needle by RN, labs collected, line flushed with saline and heparin.  Vitals taken. Pt checked in for appointment(s).     Shantell ARMIJO RN PHN BSN  BMT/Oncology Lab

## 2024-07-22 ENCOUNTER — INFUSION THERAPY VISIT (OUTPATIENT)
Dept: TRANSPLANT | Facility: CLINIC | Age: 24
End: 2024-07-22
Attending: PEDIATRICS
Payer: COMMERCIAL

## 2024-07-22 VITALS
TEMPERATURE: 98.3 F | DIASTOLIC BLOOD PRESSURE: 87 MMHG | RESPIRATION RATE: 16 BRPM | HEART RATE: 74 BPM | SYSTOLIC BLOOD PRESSURE: 125 MMHG | OXYGEN SATURATION: 96 %

## 2024-07-22 DIAGNOSIS — D57.00 SICKLE CELL PAIN CRISIS (H): Primary | ICD-10-CM

## 2024-07-22 DIAGNOSIS — D57.00 SICKLE CELL PAIN CRISIS (H): ICD-10-CM

## 2024-07-22 DIAGNOSIS — G81.10 SPASTIC HEMIPLEGIA, UNSPECIFIED ETIOLOGY, UNSPECIFIED LATERALITY (H): ICD-10-CM

## 2024-07-22 PROCEDURE — 250N000011 HC RX IP 250 OP 636: Performed by: PEDIATRICS

## 2024-07-22 PROCEDURE — 258N000003 HC RX IP 258 OP 636: Performed by: PEDIATRICS

## 2024-07-22 PROCEDURE — 96376 TX/PRO/DX INJ SAME DRUG ADON: CPT

## 2024-07-22 PROCEDURE — 96361 HYDRATE IV INFUSION ADD-ON: CPT

## 2024-07-22 PROCEDURE — 250N000013 HC RX MED GY IP 250 OP 250 PS 637: Performed by: PEDIATRICS

## 2024-07-22 PROCEDURE — 96375 TX/PRO/DX INJ NEW DRUG ADDON: CPT

## 2024-07-22 PROCEDURE — 96374 THER/PROPH/DIAG INJ IV PUSH: CPT

## 2024-07-22 RX ORDER — HEPARIN SODIUM,PORCINE 10 UNIT/ML
5 VIAL (ML) INTRAVENOUS
Status: CANCELLED | OUTPATIENT
Start: 2024-10-01

## 2024-07-22 RX ORDER — KETOROLAC TROMETHAMINE 30 MG/ML
30 INJECTION, SOLUTION INTRAMUSCULAR; INTRAVENOUS ONCE
Status: CANCELLED
Start: 2024-10-01 | End: 2024-10-01

## 2024-07-22 RX ORDER — DIPHENHYDRAMINE HCL 25 MG
50 CAPSULE ORAL
Status: CANCELLED
Start: 2024-10-01

## 2024-07-22 RX ORDER — ONDANSETRON 4 MG/1
8 TABLET, FILM COATED ORAL
Status: CANCELLED
Start: 2024-10-01

## 2024-07-22 RX ORDER — ONDANSETRON 2 MG/ML
8 INJECTION INTRAMUSCULAR; INTRAVENOUS EVERY 6 HOURS PRN
Status: CANCELLED
Start: 2024-10-01

## 2024-07-22 RX ORDER — OXYCODONE HYDROCHLORIDE 10 MG/1
10 TABLET ORAL EVERY 4 HOURS PRN
Qty: 15 TABLET | Refills: 0 | Status: SHIPPED | OUTPATIENT
Start: 2024-07-22 | End: 2024-07-29

## 2024-07-22 RX ORDER — DIPHENHYDRAMINE HCL 25 MG
50 CAPSULE ORAL
Status: COMPLETED | OUTPATIENT
Start: 2024-07-22 | End: 2024-07-22

## 2024-07-22 RX ORDER — KETOROLAC TROMETHAMINE 30 MG/ML
30 INJECTION, SOLUTION INTRAMUSCULAR; INTRAVENOUS ONCE
Status: COMPLETED | OUTPATIENT
Start: 2024-07-22 | End: 2024-07-22

## 2024-07-22 RX ORDER — HEPARIN SODIUM (PORCINE) LOCK FLUSH IV SOLN 100 UNIT/ML 100 UNIT/ML
5 SOLUTION INTRAVENOUS
Status: CANCELLED | OUTPATIENT
Start: 2024-10-01

## 2024-07-22 RX ADMIN — HYDROMORPHONE HYDROCHLORIDE 1 MG: 1 INJECTION, SOLUTION INTRAMUSCULAR; INTRAVENOUS; SUBCUTANEOUS at 11:01

## 2024-07-22 RX ADMIN — HYDROMORPHONE HYDROCHLORIDE 1 MG: 1 INJECTION, SOLUTION INTRAMUSCULAR; INTRAVENOUS; SUBCUTANEOUS at 09:56

## 2024-07-22 RX ADMIN — HYDROMORPHONE HYDROCHLORIDE 1 MG: 1 INJECTION, SOLUTION INTRAMUSCULAR; INTRAVENOUS; SUBCUTANEOUS at 12:03

## 2024-07-22 RX ADMIN — SODIUM CHLORIDE, POTASSIUM CHLORIDE, SODIUM LACTATE AND CALCIUM CHLORIDE 1000 ML: 600; 310; 30; 20 INJECTION, SOLUTION INTRAVENOUS at 10:05

## 2024-07-22 RX ADMIN — KETOROLAC TROMETHAMINE 30 MG: 30 INJECTION, SOLUTION INTRAMUSCULAR; INTRAVENOUS at 09:57

## 2024-07-22 RX ADMIN — DIPHENHYDRAMINE HYDROCHLORIDE 50 MG: 25 CAPSULE ORAL at 09:56

## 2024-07-22 NOTE — CONFIDENTIAL NOTE
Oncology Nurse Triage - Sickle Cell Pain Crisis:    Situation: Jennifer  calling about Sickle Cell Pain Crisis, requesting to be added on for IV fluids and pain medicine    Background:     Patient's last infusion was 7/19  Last clinic visit date:6/28  Does patient have active treatment plan?  Yes      Assessment of Symptoms:  Onset/Duration of symptoms: 1 day    Is it typical sickle cell pain? Yes   Location: legs  Character: Dull ache           Intensity: 6/10    Any radiation of pain, numbness, tingling, weakness, warmth, swelling, discoloration of arms or legs?No     Fever?No  (if yes max temperature recorded in last 24 hours):      Chest Pain Present: No     Shortness of breath: No     Other home therapies tried: HEAT/HEATING PAD     Last home medication taken and when: 6 am    Any Refills Needed?: Yes     Does patient have transportation & length of time to get to clinic: Yes         Recommendations:     Approved by Patricia Mantilla.  She is scheduled for 9 am today.    If you do not hear from the infusion center by 2pm then you will not be able to get in for an infusion today. If symptoms worsen while waiting for call back, and/or you experience fever, chills, SOB, chest pain, cough, n/v, dizziness, numbness, swelling, discoloration of extremities, then seek emergency evaluation in Emergency Department.     Please note, if you are late for your appt, you risk losing your infusion appt as it may delay another patient's infusion who arrived on time.

## 2024-07-22 NOTE — PROGRESS NOTES
Infusion Nursing Note:  Jennifer Cervantes presents today for add on IVF and pain meds.    Patient seen by provider today: No   present during visit today: Not Applicable.    Note: No labs/no provider. Patient assessment benign with exception of pain. Given 1L LR over 90 minutes, 50mg PO Benadryl, and 30mg IVP Toradol, and 1mg IVP Dilaudid Q1HR for max of 3 doses.     Intravenous Access:  Implanted Port.  +BR noted pre and post infusion.  De-accessed prior to discharge    Treatment Conditions:  Not Applicable.    Post Infusion Assessment:  Patient tolerated infusion without incident.       Discharge Plan:   Patient discharged in stable condition accompanied by: self.  Departure Mode: Ambulatory.      Allison Wiggins RN

## 2024-07-25 ENCOUNTER — PATIENT OUTREACH (OUTPATIENT)
Dept: CARE COORDINATION | Facility: CLINIC | Age: 24
End: 2024-07-25
Payer: COMMERCIAL

## 2024-07-25 ENCOUNTER — NURSE TRIAGE (OUTPATIENT)
Dept: ONCOLOGY | Facility: CLINIC | Age: 24
End: 2024-07-25
Payer: COMMERCIAL

## 2024-07-25 ENCOUNTER — INFUSION THERAPY VISIT (OUTPATIENT)
Dept: INFUSION THERAPY | Facility: CLINIC | Age: 24
End: 2024-07-25
Attending: PEDIATRICS
Payer: COMMERCIAL

## 2024-07-25 VITALS
TEMPERATURE: 98.2 F | RESPIRATION RATE: 16 BRPM | SYSTOLIC BLOOD PRESSURE: 125 MMHG | OXYGEN SATURATION: 90 % | HEART RATE: 75 BPM | DIASTOLIC BLOOD PRESSURE: 84 MMHG

## 2024-07-25 DIAGNOSIS — G81.10 SPASTIC HEMIPLEGIA, UNSPECIFIED ETIOLOGY, UNSPECIFIED LATERALITY (H): ICD-10-CM

## 2024-07-25 DIAGNOSIS — D57.00 SICKLE CELL PAIN CRISIS (H): Primary | ICD-10-CM

## 2024-07-25 PROCEDURE — 258N000003 HC RX IP 258 OP 636: Performed by: PEDIATRICS

## 2024-07-25 PROCEDURE — 96374 THER/PROPH/DIAG INJ IV PUSH: CPT

## 2024-07-25 PROCEDURE — 250N000011 HC RX IP 250 OP 636: Performed by: PEDIATRICS

## 2024-07-25 PROCEDURE — 96375 TX/PRO/DX INJ NEW DRUG ADDON: CPT

## 2024-07-25 PROCEDURE — 96376 TX/PRO/DX INJ SAME DRUG ADON: CPT

## 2024-07-25 PROCEDURE — 96361 HYDRATE IV INFUSION ADD-ON: CPT

## 2024-07-25 PROCEDURE — 250N000013 HC RX MED GY IP 250 OP 250 PS 637: Performed by: PEDIATRICS

## 2024-07-25 RX ORDER — HEPARIN SODIUM,PORCINE 10 UNIT/ML
5 VIAL (ML) INTRAVENOUS
Status: DISCONTINUED | OUTPATIENT
Start: 2024-07-25 | End: 2024-07-25 | Stop reason: HOSPADM

## 2024-07-25 RX ORDER — ONDANSETRON 2 MG/ML
8 INJECTION INTRAMUSCULAR; INTRAVENOUS EVERY 6 HOURS PRN
Status: CANCELLED
Start: 2024-10-01

## 2024-07-25 RX ORDER — HEPARIN SODIUM,PORCINE 10 UNIT/ML
5 VIAL (ML) INTRAVENOUS
Status: CANCELLED | OUTPATIENT
Start: 2024-10-01

## 2024-07-25 RX ORDER — ONDANSETRON 2 MG/ML
8 INJECTION INTRAMUSCULAR; INTRAVENOUS EVERY 6 HOURS PRN
Status: DISCONTINUED | OUTPATIENT
Start: 2024-07-25 | End: 2024-07-25 | Stop reason: HOSPADM

## 2024-07-25 RX ORDER — KETOROLAC TROMETHAMINE 30 MG/ML
30 INJECTION, SOLUTION INTRAMUSCULAR; INTRAVENOUS ONCE
Status: CANCELLED
Start: 2024-10-01 | End: 2024-10-01

## 2024-07-25 RX ORDER — HEPARIN SODIUM (PORCINE) LOCK FLUSH IV SOLN 100 UNIT/ML 100 UNIT/ML
5 SOLUTION INTRAVENOUS
Status: CANCELLED | OUTPATIENT
Start: 2024-10-01

## 2024-07-25 RX ORDER — KETOROLAC TROMETHAMINE 30 MG/ML
30 INJECTION, SOLUTION INTRAMUSCULAR; INTRAVENOUS ONCE
Status: COMPLETED | OUTPATIENT
Start: 2024-07-25 | End: 2024-07-25

## 2024-07-25 RX ORDER — HEPARIN SODIUM (PORCINE) LOCK FLUSH IV SOLN 100 UNIT/ML 100 UNIT/ML
5 SOLUTION INTRAVENOUS
Status: DISCONTINUED | OUTPATIENT
Start: 2024-07-25 | End: 2024-07-25 | Stop reason: HOSPADM

## 2024-07-25 RX ORDER — ONDANSETRON 4 MG/1
8 TABLET, FILM COATED ORAL
Status: CANCELLED
Start: 2024-10-01

## 2024-07-25 RX ORDER — DIPHENHYDRAMINE HCL 25 MG
50 CAPSULE ORAL
Status: COMPLETED | OUTPATIENT
Start: 2024-07-25 | End: 2024-07-25

## 2024-07-25 RX ORDER — DIPHENHYDRAMINE HCL 25 MG
50 CAPSULE ORAL
Status: CANCELLED
Start: 2024-10-01

## 2024-07-25 RX ADMIN — ONDANSETRON 8 MG: 2 INJECTION INTRAMUSCULAR; INTRAVENOUS at 11:33

## 2024-07-25 RX ADMIN — SODIUM CHLORIDE, POTASSIUM CHLORIDE, SODIUM LACTATE AND CALCIUM CHLORIDE 1000 ML: 600; 310; 30; 20 INJECTION, SOLUTION INTRAVENOUS at 11:41

## 2024-07-25 RX ADMIN — HYDROMORPHONE HYDROCHLORIDE 1 MG: 1 INJECTION, SOLUTION INTRAMUSCULAR; INTRAVENOUS; SUBCUTANEOUS at 12:38

## 2024-07-25 RX ADMIN — HYDROMORPHONE HYDROCHLORIDE 1 MG: 1 INJECTION, SOLUTION INTRAMUSCULAR; INTRAVENOUS; SUBCUTANEOUS at 11:38

## 2024-07-25 RX ADMIN — Medication 5 ML: at 13:45

## 2024-07-25 RX ADMIN — KETOROLAC TROMETHAMINE 30 MG: 30 INJECTION, SOLUTION INTRAMUSCULAR; INTRAVENOUS at 11:40

## 2024-07-25 RX ADMIN — DIPHENHYDRAMINE HYDROCHLORIDE 50 MG: 25 CAPSULE ORAL at 11:40

## 2024-07-25 RX ADMIN — HYDROMORPHONE HYDROCHLORIDE 1 MG: 1 INJECTION, SOLUTION INTRAMUSCULAR; INTRAVENOUS; SUBCUTANEOUS at 13:34

## 2024-07-25 NOTE — PATIENT INSTRUCTIONS
Dear Jennifer Cervantes    Thank you for choosing [facility] ([facility] for your infusion.  The following information is a summary of our appointment as well as important reminders.      If you are a transplant patient and require transplant education, please click on this link: [email or web address].    If you have any questions on your upcoming Specialty Infusion appointments, please call scheduling at [phone].  It was a pleasure taking care of you today.    Sincerely,    [facility]  [facility]  [address] [place] [address]  [address]  Phone:  [phone]

## 2024-07-25 NOTE — PROGRESS NOTES
Social Work - Transportation  [facility]    Data/Intervention:  Patient Name: Jennifer Cervantes Goes By: Jennifer    /Age: 2000 (24 year old)    Referral From: [facility]  Reason for Referral:  support requested for patient transportation needs for today's appointment.  Assessment:  called [employer] to arrange ride through patient's insurance. [employer] arranged  for patient from home with [employer]. Patient will need to call [phone] when ready for return ride home.  Plan: Patient is aware of the transportation plan.  available to assist with any other needs.    CARLOS Chavez,UDAY  Hematology/Oncology Social Worker  Phone:[phone] Pager: [phone]

## 2024-07-25 NOTE — TELEPHONE ENCOUNTER
Oncology Nurse Triage - Sickle Cell Pain Crisis:    Situation: Jennifer  calling about Sickle Cell Pain Crisis, requesting to be added on for IV fluids and pain medicine    Background:     Patient's last infusion was 7/22/24  Last clinic visit date:6/19/24 Daya Nascimento   Does patient have active treatment plan?  Yes      Assessment of Symptoms:  Onset/Duration of symptoms: 2 day    Is it typical sickle cell pain? Yes   Location: legs arms   Character: Sharp           Intensity: 7/10    Any radiation of pain, numbness, tingling, weakness, warmth, swelling, discoloration of arms or legs?No     Fever?No  (if yes max temperature recorded in last 24 hours):      Chest Pain Present: No     Shortness of breath: No     Other home therapies tried: HEAT/HEATING PAD and WARM BATH     Last home medication taken and when: oxycodone and ibuprofen at 6am     Any Refills Needed?: No     Does patient have transportation & length of time to get to clinic: No if early opening please let patient know         Recommendations:     Placed on infusion call list     If you do not hear from the infusion center by 2pm then you will not be able to get in for an infusion today. If symptoms worsen while waiting for call back, and/or you experience fever, chills, SOB, chest pain, cough, n/v, dizziness, numbness, swelling, discoloration of extremities, then seek emergency evaluation in Emergency Department.     Please note, if you are late for your appt, you risk losing your infusion appt as it may delay another patient's infusion who arrived on time.

## 2024-07-25 NOTE — TELEPHONE ENCOUNTER
[facility] can take at 11am     Call placed to Jennifer and she can come in at 11am   Message sent to [facility] to schedule     Message sent to  to arrange transportation.

## 2024-07-25 NOTE — PROGRESS NOTES
Infusion Nursing Note:  Jennifer Cervantes presents today for Patient presents with:  Infusion: Fluids, pain meds- sickle cell crisis      Patient seen by provider today: No   present during visit today: No    Note: During today's [facility] appointment, orders from Dr. Duncan were completed.  Patient identification verified by name and date of birth.  Assessment completed.  Vitals recorded in Doc Flowsheets.  Patient was provided with education regarding medication/procedure and possible side effects.  Patient verbalized understanding.      Administrations This Visit       diphenhydrAMINE (BENADRYL) capsule 50 mg       Admin Date  07/25/2024 Action  $Given Dose  50 mg Route  Oral Documented By  Elena Kelly RN              heparin lock flush 100 unit/mL injection 5 mL       Admin Date  07/25/2024 Action  $Given Dose  5 mL Route  Intracatheter Documented By  Elena Kelly RN              HYDROmorphone (DILAUDID) injection 1 mg       Admin Date  07/25/2024 Action  $Given Dose  1 mg Route  Intravenous Documented By  Elena Kelly RN               Admin Date  07/25/2024 Action  $Given Dose  1 mg Route  Intravenous Documented By  Elena Kelly RN               Admin Date  07/25/2024 Action  $Given Dose  1 mg Route  Intravenous Documented By  Elnea Kelly RN              ketorolac (TORADOL) injection 30 mg       Admin Date  07/25/2024 Action  $Given Dose  30 mg Route  Intravenous Documented By  Elena Kelly RN              lactated ringers BOLUS 1,000 mL       Admin Date  07/25/2024 Action  $New Bag Dose  1,000 mL Rate  500 mL/hr Route  Intravenous Documented By  Elena Kelly RN              ondansetron (ZOFRAN) injection 8 mg       Admin Date  07/25/2024 Action  $Given Dose  8 mg Route  Intravenous Documented By  Elena Kelly RN                    Intravenous Access:  Port accessed    Treatment Conditions:  N/A.      Post Infusion Assessment:  Patient  tolerated infusion without incident.  Site patent and intact, free from redness, edema or discomfort.  No evidence of extravasations.  Access discontinued per protocol.       Discharge Plan:   Discharge instructions reviewed with: Patient.  AVS to patient via [employer].  Patient will return   Future Appointments   Date Time Provider Department Center   8/15/2024  9:30 AM  [facility]   8/15/2024 10:00 AM Patricia Mantilla CNP [facility]   8/15/2024 10:30 AM  [facility] INFUSION NURSE [facility]   9/13/2024 12:00 PM  [facility]   9/13/2024 12:30 PM  [facility] INFUSION NURSE [facility]   9/18/2024 12:00 PM [id number]   9/18/2024 12:30 PM [facility]   9/18/2024  1:30 PM [facility]   9/18/2024  2:55 PM Jake Harvey MD [facility]   10/11/2024 10:00 AM  [facility]   10/11/2024 10:30 AM Patricia Mantilla CNP [facility]   10/11/2024 11:30 AM  [facility] INFUSION NURSE [facility]   11/8/2024 12:00 PM  [facility]   11/8/2024 12:30 PM  [facility] INFUSION NURSE [facility]   3/27/2025  3:00 PM Shandra Caruso, PhD [facility]      for next appointment.   Patient discharged in stable condition accompanied by: self. Via medical ride at 1410  Departure Mode: Ambulatory    /84 (BP Location: Left arm, Patient Position: Semi-Short's, Cuff Size: Adult Regular)   Pulse 75   Temp 98.2  F (36.8  C) (Oral)   Resp 16   LMP  (LMP Unknown)   SpO2 90%   Elena Kelly RN on 7/25/2024 at 11:48 AM  .

## 2024-07-29 ENCOUNTER — NURSE TRIAGE (OUTPATIENT)
Dept: ONCOLOGY | Facility: CLINIC | Age: 24
End: 2024-07-29
Payer: COMMERCIAL

## 2024-07-29 DIAGNOSIS — D57.00 SICKLE CELL PAIN CRISIS (H): ICD-10-CM

## 2024-07-29 RX ORDER — OXYCODONE HYDROCHLORIDE 10 MG/1
10 TABLET ORAL EVERY 4 HOURS PRN
Qty: 15 TABLET | Refills: 0 | Status: SHIPPED | OUTPATIENT
Start: 2024-07-29 | End: 2024-08-05

## 2024-07-29 NOTE — TELEPHONE ENCOUNTER
Jennifer calling to ask if there is any room in infusion this afternoon.  Informed caller that there are no current openings.  Pt voiced understanding.

## 2024-07-29 NOTE — TELEPHONE ENCOUNTER
Oncology Nurse Triage - Sickle Cell Pain Crisis:    Situation: Jennifer  calling about Sickle Cell Pain Crisis, requesting to be added on for IV fluids and pain medicine    Background:     Patient's last infusion was 07/25/4   Last clinic visit date:06/19/24 w/Daya Nascimento  Does patient have active treatment plan?  Yes      Assessment of Symptoms:  Onset/Duration of symptoms: 3 day    Is it typical sickle cell pain? Yes   Location: Generalized  Character: Sharp           Intensity: 8/10    Any radiation of pain, numbness, tingling, weakness, warmth, swelling, discoloration of arms or legs?No     Fever?No  (if yes max temperature recorded in last 24 hours):      Chest Pain Present: No     Shortness of breath: No     Other home therapies tried: HEAT/HEATING PAD and warm showers      Last home medication taken and when: 0600 oxycodone     Any Refills Needed?: Yes     Does patient have transportation & length of time to get to clinic: Yes, 10 min        Recommendations:     If you do not hear from the infusion center by 2pm then you will not be able to get in for an infusion today. If symptoms worsen while waiting for call back, and/or you experience fever, chills, SOB, chest pain, cough, n/v, dizziness, numbness, swelling, discoloration of extremities, then seek emergency evaluation in Emergency Department.     Please note, if you are late for your appt, you risk losing your infusion appt as it may delay another patient's infusion who arrived on time.

## 2024-07-29 NOTE — TELEPHONE ENCOUNTER
Narcotic Refill Request    Medication(s) requested:  Oxycodone  Person Requesting Refill: patient  What pain is the medication treating: Sickle Cell pain  How is the medication being taken?:Every 6 hrs  Does pt have enough for today? No  Is pain being adequately controlled on the current regimen?: Yes  Experiencing any side effects from medication?: No    Date of most recent appointment:  06/19/24 w/Daya Nascimento  Any No Show Visits: No  Next appointment:   08/15/24 w/Patricia Mantilla  Last fill date and by whom:  07/22/24 by Patricia Mantilla   Reviewed: no access    Routed provider: Patricia Mantilla

## 2024-07-30 ENCOUNTER — NURSE TRIAGE (OUTPATIENT)
Dept: ONCOLOGY | Facility: CLINIC | Age: 24
End: 2024-07-30
Payer: COMMERCIAL

## 2024-07-30 ENCOUNTER — PATIENT OUTREACH (OUTPATIENT)
Dept: CARE COORDINATION | Facility: CLINIC | Age: 24
End: 2024-07-30
Payer: COMMERCIAL

## 2024-07-30 ENCOUNTER — INFUSION THERAPY VISIT (OUTPATIENT)
Dept: INFUSION THERAPY | Facility: CLINIC | Age: 24
End: 2024-07-30
Attending: PEDIATRICS
Payer: COMMERCIAL

## 2024-07-30 VITALS
SYSTOLIC BLOOD PRESSURE: 112 MMHG | DIASTOLIC BLOOD PRESSURE: 78 MMHG | TEMPERATURE: 98.7 F | RESPIRATION RATE: 16 BRPM | OXYGEN SATURATION: 92 % | HEART RATE: 85 BPM

## 2024-07-30 DIAGNOSIS — G81.10 SPASTIC HEMIPLEGIA, UNSPECIFIED ETIOLOGY, UNSPECIFIED LATERALITY (H): ICD-10-CM

## 2024-07-30 DIAGNOSIS — D57.00 SICKLE CELL PAIN CRISIS (H): Primary | ICD-10-CM

## 2024-07-30 PROCEDURE — 96374 THER/PROPH/DIAG INJ IV PUSH: CPT

## 2024-07-30 PROCEDURE — 96376 TX/PRO/DX INJ SAME DRUG ADON: CPT

## 2024-07-30 PROCEDURE — 258N000003 HC RX IP 258 OP 636: Performed by: PEDIATRICS

## 2024-07-30 PROCEDURE — 250N000011 HC RX IP 250 OP 636: Performed by: PEDIATRICS

## 2024-07-30 PROCEDURE — 96361 HYDRATE IV INFUSION ADD-ON: CPT

## 2024-07-30 PROCEDURE — 96375 TX/PRO/DX INJ NEW DRUG ADDON: CPT

## 2024-07-30 RX ORDER — DIPHENHYDRAMINE HCL 25 MG
50 CAPSULE ORAL
Status: DISCONTINUED | OUTPATIENT
Start: 2024-07-30 | End: 2024-07-30 | Stop reason: HOSPADM

## 2024-07-30 RX ORDER — KETOROLAC TROMETHAMINE 30 MG/ML
30 INJECTION, SOLUTION INTRAMUSCULAR; INTRAVENOUS ONCE
Status: COMPLETED | OUTPATIENT
Start: 2024-07-30 | End: 2024-07-30

## 2024-07-30 RX ORDER — HEPARIN SODIUM (PORCINE) LOCK FLUSH IV SOLN 100 UNIT/ML 100 UNIT/ML
5 SOLUTION INTRAVENOUS
Status: DISCONTINUED | OUTPATIENT
Start: 2024-07-30 | End: 2024-07-30 | Stop reason: HOSPADM

## 2024-07-30 RX ORDER — DIPHENHYDRAMINE HCL 25 MG
50 CAPSULE ORAL
Status: CANCELLED
Start: 2024-10-01

## 2024-07-30 RX ORDER — HEPARIN SODIUM,PORCINE 10 UNIT/ML
5 VIAL (ML) INTRAVENOUS
Status: CANCELLED | OUTPATIENT
Start: 2024-10-01

## 2024-07-30 RX ORDER — HEPARIN SODIUM (PORCINE) LOCK FLUSH IV SOLN 100 UNIT/ML 100 UNIT/ML
5 SOLUTION INTRAVENOUS
Status: CANCELLED | OUTPATIENT
Start: 2024-10-01

## 2024-07-30 RX ORDER — KETOROLAC TROMETHAMINE 30 MG/ML
30 INJECTION, SOLUTION INTRAMUSCULAR; INTRAVENOUS ONCE
Status: CANCELLED
Start: 2024-10-01 | End: 2024-10-01

## 2024-07-30 RX ORDER — ONDANSETRON 4 MG/1
8 TABLET, FILM COATED ORAL
Status: CANCELLED
Start: 2024-10-01

## 2024-07-30 RX ORDER — ONDANSETRON 2 MG/ML
8 INJECTION INTRAMUSCULAR; INTRAVENOUS EVERY 6 HOURS PRN
Status: DISCONTINUED | OUTPATIENT
Start: 2024-07-30 | End: 2024-07-30 | Stop reason: HOSPADM

## 2024-07-30 RX ORDER — ONDANSETRON 2 MG/ML
8 INJECTION INTRAMUSCULAR; INTRAVENOUS EVERY 6 HOURS PRN
Status: CANCELLED
Start: 2024-10-01

## 2024-07-30 RX ADMIN — HYDROMORPHONE HYDROCHLORIDE 1 MG: 1 INJECTION, SOLUTION INTRAMUSCULAR; INTRAVENOUS; SUBCUTANEOUS at 12:07

## 2024-07-30 RX ADMIN — HYDROMORPHONE HYDROCHLORIDE 1 MG: 1 INJECTION, SOLUTION INTRAMUSCULAR; INTRAVENOUS; SUBCUTANEOUS at 13:10

## 2024-07-30 RX ADMIN — Medication 5 ML: at 14:23

## 2024-07-30 RX ADMIN — ONDANSETRON 8 MG: 2 INJECTION INTRAMUSCULAR; INTRAVENOUS at 12:10

## 2024-07-30 RX ADMIN — HYDROMORPHONE HYDROCHLORIDE 1 MG: 1 INJECTION, SOLUTION INTRAMUSCULAR; INTRAVENOUS; SUBCUTANEOUS at 14:17

## 2024-07-30 RX ADMIN — KETOROLAC TROMETHAMINE 30 MG: 30 INJECTION, SOLUTION INTRAMUSCULAR; INTRAVENOUS at 12:47

## 2024-07-30 RX ADMIN — SODIUM CHLORIDE, POTASSIUM CHLORIDE, SODIUM LACTATE AND CALCIUM CHLORIDE 1000 ML: 600; 310; 30; 20 INJECTION, SOLUTION INTRAVENOUS at 12:01

## 2024-07-30 NOTE — TELEPHONE ENCOUNTER
"Oncology Nurse Triage - Sickle Cell Pain Crisis:    Situation: Jennifer  calling about Sickle Cell Pain Crisis, requesting to be added on for IV fluids and pain medicine    Background:     Patient's last infusion was 07/25/4       Last clinic visit date:06/19/24 w/Daya Nascimento  Does patient have active treatment plan?  Yes      Assessment of Symptoms:  Onset/Duration of symptoms: 3 day    Is it typical sickle cell pain? Yes   Location: \"everywhere\"  Character: Sharp           Intensity: 9/10    Any radiation of pain, numbness, tingling, weakness, warmth, swelling, discoloration of arms or legs?No     Fever?No  (if yes max temperature recorded in last 24 hours):      Chest Pain Present: No     Shortness of breath: No     Other home therapies tried: HEAT/HEATING PAD and WARM BATH     Last home medication taken and when: 0600 oxycodone    Any Refills Needed?: No    Does patient have transportation & length of time to get to clinic: Yes, 10 min away        Recommendations:     If you do not hear from the infusion center by 2pm then you will not be able to get in for an infusion today. If symptoms worsen while waiting for call back, and/or you experience fever, chills, SOB, chest pain, cough, n/v, dizziness, numbness, swelling, discoloration of extremities, then seek emergency evaluation in Emergency Department.     Please note, if you are late for your appt, you risk losing your infusion appt as it may delay another patient's infusion who arrived on time.           "

## 2024-07-30 NOTE — PROGRESS NOTES
Infusion Nursing Note:  Jennifer Cervantes presents today for IVF, pain medication.    Patient seen by provider today: No   present during visit today: Not Applicable.    Note: Jennifer reports have a pain crisis - 9/10 generalized pain.   Given 1L LR, 3 doses of 1mg IV dilaudid, 30mg IV Toradol, and 8mg of IV Zofran.     On discharge pain was down to 4/10. Patient confirmed she had transportation arranged.    Intravenous Access:  Implanted Port.    Treatment Conditions:  Not Applicable.    Post Infusion Assessment:  Patient tolerated infusion without incident.  Blood return noted pre and post infusion.  Site patent and intact, free from redness, edema or discomfort.  No evidence of extravasations.  Access discontinued per protocol.     Discharge Plan:   Discharge instructions reviewed with: Patient.  Patient and/or family verbalized understanding of discharge instructions and all questions answered.  AVS to patient via [employer].  Patient will return PRN for next appointment.   Patient discharged in stable condition accompanied by: self.  Departure Mode: Ambulatory.    Ashwin Cox RN

## 2024-07-30 NOTE — PROGRESS NOTES
Social Work - Transportation  [facility]    Data/Intervention:  Patient Name: Jennifer Cervantes Goes By: Jennifer    /Age: 2000 (24 year old)    Referral From: [facility]  Reason for Referral:  support requested for patient transportation needs for today's appointment.  Assessment:  called [employer] to arrange ride through patient's insurance. [employer] arranged  for patient from home with [employer]. Patient will need to call [phone] when ready for return ride home.  Plan: Patient is aware of the transportation plan.  available to assist with any other needs.    CARLOS Chavez,UDAY  Hematology/Oncology Social Worker  Phone:[phone] Pager: [phone]

## 2024-07-30 NOTE — TELEPHONE ENCOUNTER
Available time at [facility]. Secure message to Patricia Mantilla to verify okay to schedule pt at [facility]. Patricia approving scheduling pt at [facility].    Call to [facility] to assist with scheduling pt. Pt scheduled for 1130 infusion.     Call to pt to inform of of appt. Notified pt SW would work on transportation. Pt verbalized understanding.

## 2024-07-31 ENCOUNTER — NURSE TRIAGE (OUTPATIENT)
Dept: ONCOLOGY | Facility: CLINIC | Age: 24
End: 2024-07-31

## 2024-07-31 NOTE — TELEPHONE ENCOUNTER
Oncology Nurse Triage - Sickle Cell Pain Crisis:    Situation: Jennifer  calling about Sickle Cell Pain Crisis, requesting to be added on for IV fluids and pain medicine    Background:     Patient's last infusion was 07/30/24  Last clinic visit date:06/19/24 w/Daya Nascimento  Does patient have active treatment plan?  Yes      Assessment of Symptoms:  Onset/Duration of symptoms: 1 day    Is it typical sickle cell pain? Yes   Location: everywhere  Character: Dull ache           Intensity: 6/10    Any radiation of pain, numbness, tingling, weakness, warmth, swelling, discoloration of arms or legs?No     Fever?No  (if yes max temperature recorded in last 24 hours):      Chest Pain Present: No     Shortness of breath: No     Other home therapies tried: HEAT/HEATING PAD     Last home medication taken and when: 0600 oxycodone and ibuprofen    Any Refills Needed?: No     Does patient have transportation & length of time to get to clinic: No, needs assistance with transportation.        Recommendations:     If you do not hear from the infusion center by 2pm then you will not be able to get in for an infusion today. If symptoms worsen while waiting for call back, and/or you experience fever, chills, SOB, chest pain, cough, n/v, dizziness, numbness, swelling, discoloration of extremities, then seek emergency evaluation in Emergency Department.

## 2024-08-01 ENCOUNTER — INFUSION THERAPY VISIT (OUTPATIENT)
Dept: TRANSPLANT | Facility: CLINIC | Age: 24
End: 2024-08-01
Attending: PEDIATRICS
Payer: COMMERCIAL

## 2024-08-01 ENCOUNTER — NURSE TRIAGE (OUTPATIENT)
Dept: ONCOLOGY | Facility: CLINIC | Age: 24
End: 2024-08-01
Payer: COMMERCIAL

## 2024-08-01 VITALS
OXYGEN SATURATION: 94 % | TEMPERATURE: 98.9 F | RESPIRATION RATE: 16 BRPM | SYSTOLIC BLOOD PRESSURE: 112 MMHG | DIASTOLIC BLOOD PRESSURE: 76 MMHG | HEART RATE: 100 BPM

## 2024-08-01 DIAGNOSIS — G81.10 SPASTIC HEMIPLEGIA, UNSPECIFIED ETIOLOGY, UNSPECIFIED LATERALITY (H): ICD-10-CM

## 2024-08-01 DIAGNOSIS — D57.00 SICKLE CELL PAIN CRISIS (H): Primary | ICD-10-CM

## 2024-08-01 PROCEDURE — 96375 TX/PRO/DX INJ NEW DRUG ADDON: CPT

## 2024-08-01 PROCEDURE — 250N000011 HC RX IP 250 OP 636: Performed by: PEDIATRICS

## 2024-08-01 PROCEDURE — 96361 HYDRATE IV INFUSION ADD-ON: CPT

## 2024-08-01 PROCEDURE — 96374 THER/PROPH/DIAG INJ IV PUSH: CPT

## 2024-08-01 PROCEDURE — 258N000003 HC RX IP 258 OP 636: Performed by: PEDIATRICS

## 2024-08-01 PROCEDURE — 96376 TX/PRO/DX INJ SAME DRUG ADON: CPT

## 2024-08-01 PROCEDURE — 250N000013 HC RX MED GY IP 250 OP 250 PS 637: Performed by: PEDIATRICS

## 2024-08-01 RX ORDER — HEPARIN SODIUM,PORCINE 10 UNIT/ML
5 VIAL (ML) INTRAVENOUS
Status: CANCELLED | OUTPATIENT
Start: 2024-10-01

## 2024-08-01 RX ORDER — DIPHENHYDRAMINE HCL 25 MG
50 CAPSULE ORAL
Status: CANCELLED
Start: 2024-10-01

## 2024-08-01 RX ORDER — KETOROLAC TROMETHAMINE 30 MG/ML
30 INJECTION, SOLUTION INTRAMUSCULAR; INTRAVENOUS ONCE
Status: COMPLETED | OUTPATIENT
Start: 2024-08-01 | End: 2024-08-01

## 2024-08-01 RX ORDER — HEPARIN SODIUM (PORCINE) LOCK FLUSH IV SOLN 100 UNIT/ML 100 UNIT/ML
5 SOLUTION INTRAVENOUS
Status: DISCONTINUED | OUTPATIENT
Start: 2024-08-01 | End: 2024-08-01 | Stop reason: HOSPADM

## 2024-08-01 RX ORDER — HEPARIN SODIUM (PORCINE) LOCK FLUSH IV SOLN 100 UNIT/ML 100 UNIT/ML
5 SOLUTION INTRAVENOUS
Status: CANCELLED | OUTPATIENT
Start: 2024-10-01

## 2024-08-01 RX ORDER — ONDANSETRON 2 MG/ML
8 INJECTION INTRAMUSCULAR; INTRAVENOUS EVERY 6 HOURS PRN
Status: CANCELLED
Start: 2024-10-01

## 2024-08-01 RX ORDER — DIPHENHYDRAMINE HCL 25 MG
50 CAPSULE ORAL
Status: COMPLETED | OUTPATIENT
Start: 2024-08-01 | End: 2024-08-01

## 2024-08-01 RX ORDER — KETOROLAC TROMETHAMINE 30 MG/ML
30 INJECTION, SOLUTION INTRAMUSCULAR; INTRAVENOUS ONCE
Status: CANCELLED
Start: 2024-10-01 | End: 2024-10-01

## 2024-08-01 RX ORDER — ONDANSETRON 4 MG/1
8 TABLET, FILM COATED ORAL
Status: CANCELLED
Start: 2024-10-01

## 2024-08-01 RX ADMIN — DIPHENHYDRAMINE HYDROCHLORIDE 50 MG: 25 CAPSULE ORAL at 09:19

## 2024-08-01 RX ADMIN — HYDROMORPHONE HYDROCHLORIDE 1 MG: 1 INJECTION, SOLUTION INTRAMUSCULAR; INTRAVENOUS; SUBCUTANEOUS at 10:28

## 2024-08-01 RX ADMIN — SODIUM CHLORIDE, POTASSIUM CHLORIDE, SODIUM LACTATE AND CALCIUM CHLORIDE 1000 ML: 600; 310; 30; 20 INJECTION, SOLUTION INTRAVENOUS at 09:18

## 2024-08-01 RX ADMIN — HYDROMORPHONE HYDROCHLORIDE 1 MG: 1 INJECTION, SOLUTION INTRAMUSCULAR; INTRAVENOUS; SUBCUTANEOUS at 09:25

## 2024-08-01 RX ADMIN — Medication 5 ML: at 11:27

## 2024-08-01 RX ADMIN — KETOROLAC TROMETHAMINE 30 MG: 30 INJECTION, SOLUTION INTRAMUSCULAR; INTRAVENOUS at 09:19

## 2024-08-01 RX ADMIN — HYDROMORPHONE HYDROCHLORIDE 1 MG: 1 INJECTION, SOLUTION INTRAMUSCULAR; INTRAVENOUS; SUBCUTANEOUS at 11:26

## 2024-08-01 NOTE — TELEPHONE ENCOUNTER
Oncology Nurse Triage - Sickle Cell Pain Crisis:  Situation: Jennifer  calling about Sickle Cell Pain Crisis, requesting to be added on for IV fluids and pain medicine    Background:   Patient's last infusion was 7/30/24  Last clinic visit date:6/19/24 with RONALDO Franco  Does patient have active treatment plan?  Yes    Assessment of Symptoms:  Onset/Duration of symptoms: 7 day    Is it typical sickle cell pain? Yes   Location: all over  Character: Dull ache           Intensity: 8/10    Any radiation of pain, numbness, tingling, weakness, warmth, swelling, discoloration of arms or legs?No     Fever?No  Chest Pain Present: No   Shortness of breath: No     Other home therapies tried: HEAT/HEATING PAD and WARM BATH     Last home medication taken and when: oxycodone, ibuprofen at 0600    Any Refills Needed?: No     Does patient have transportation & length of time to get to clinic: No If early opening, then can get there herself--. If at 10 or 11, she needs a ride.    Recommendations:   If you do not hear from the infusion center by 2pm then you will not be able to get in for an infusion today. If symptoms worsen while waiting for call back, and/or you experience fever, chills, SOB, chest pain, cough, n/v, dizziness, numbness, swelling, discoloration of extremities, then seek emergency evaluation in Emergency Department.     Pt voiced understanding.  Added to infusion wait list.    0711:BMT can offer 0930 apt.  0712: Call made to Jennifer who confirmed she can come to the clinic at that time. She has transportation to and from the clinic today.  Updated infusion charge nurse.    0750: Message sent to [facility] to schedule pt today.    Message routed to Care Team.

## 2024-08-01 NOTE — PROGRESS NOTES
Infusion Nursing Note:  Jennifer Cervantes presents today for IVF and pain medications.    Patient seen by provider today: No   present during visit today: Not Applicable.    Note: Allergies and home medications reviewed. Pt reports pain at a 7/10 upon admission to clinic and a 4/10 upon discharge. Pt received 1L LR with x3 doses of IV Dilaudid, x1 dose Toradol and PO Benadryl. Tolerated all infusions well, discharged with no further concerns.       Intravenous Access:  Implanted Port.    Treatment Conditions:  Not Applicable.      Post Infusion Assessment:  Patient tolerated infusion without incident.  Blood return noted pre and post infusion.       Discharge Plan:   Patient and/or family verbalized understanding of discharge instructions and all questions answered.  Patient discharged in stable condition accompanied by: self.      Radha Galindo RN

## 2024-08-04 ENCOUNTER — HOSPITAL ENCOUNTER (EMERGENCY)
Facility: CLINIC | Age: 24
Discharge: HOME | End: 2024-08-04
Attending: EMERGENCY MEDICINE | Admitting: EMERGENCY MEDICINE
Payer: COMMERCIAL

## 2024-08-04 VITALS
OXYGEN SATURATION: 92 % | RESPIRATION RATE: 16 BRPM | TEMPERATURE: 97.9 F | DIASTOLIC BLOOD PRESSURE: 69 MMHG | HEART RATE: 88 BPM | BODY MASS INDEX: 25.61 KG/M2 | WEIGHT: 150 LBS | HEIGHT: 64 IN | SYSTOLIC BLOOD PRESSURE: 108 MMHG

## 2024-08-04 DIAGNOSIS — D57.00 SICKLE CELL PAIN CRISIS (H): ICD-10-CM

## 2024-08-04 LAB
ANION GAP SERPL CALCULATED.3IONS-SCNC: 11 MMOL/L (ref 7–15)
BASOPHILS # BLD AUTO: 0.2 10E3/UL (ref 0–0.2)
BASOPHILS NFR BLD AUTO: 1 %
BUN SERPL-MCNC: 9.5 MG/DL (ref 6–20)
CALCIUM SERPL-MCNC: 8.6 MG/DL (ref 8.8–10.4)
CHLORIDE SERPL-SCNC: 102 MMOL/L (ref 98–107)
CREAT SERPL-MCNC: 0.57 MG/DL (ref 0.51–0.95)
EGFRCR SERPLBLD CKD-EPI 2021: >90 ML/MIN/1.73M2
EOSINOPHIL # BLD AUTO: 0.4 10E3/UL (ref 0–0.7)
EOSINOPHIL NFR BLD AUTO: 3 %
ERYTHROCYTE [DISTWIDTH] IN BLOOD BY AUTOMATED COUNT: 28.4 % (ref 10–15)
GLUCOSE SERPL-MCNC: 102 MG/DL (ref 70–99)
HCO3 SERPL-SCNC: 23 MMOL/L (ref 22–29)
HCT VFR BLD AUTO: 18.6 % (ref 35–47)
HGB BLD-MCNC: 6.7 G/DL (ref 11.7–15.7)
IMM GRANULOCYTES # BLD: 0.1 10E3/UL
IMM GRANULOCYTES NFR BLD: 1 %
LYMPHOCYTES # BLD AUTO: 2.5 10E3/UL (ref 0.8–5.3)
LYMPHOCYTES NFR BLD AUTO: 21 %
MCH RBC QN AUTO: 31.6 PG (ref 26.5–33)
MCHC RBC AUTO-ENTMCNC: 36 G/DL (ref 31.5–36.5)
MCV RBC AUTO: 88 FL (ref 78–100)
MONOCYTES # BLD AUTO: 1.1 10E3/UL (ref 0–1.3)
MONOCYTES NFR BLD AUTO: 9 %
NEUTROPHILS # BLD AUTO: 7.9 10E3/UL (ref 1.6–8.3)
NEUTROPHILS NFR BLD AUTO: 65 %
NRBC # BLD AUTO: 0.6 10E3/UL
NRBC BLD AUTO-RTO: 5 /100
PLATELET # BLD AUTO: 402 10E3/UL (ref 150–450)
POTASSIUM SERPL-SCNC: 3.6 MMOL/L (ref 3.4–5.3)
RBC # BLD AUTO: 2.12 10E6/UL (ref 3.8–5.2)
SODIUM SERPL-SCNC: 136 MMOL/L (ref 135–145)
WBC # BLD AUTO: 12.1 10E3/UL (ref 4–11)

## 2024-08-04 PROCEDURE — 99284 EMERGENCY DEPT VISIT MOD MDM: CPT | Mod: 25 | Performed by: EMERGENCY MEDICINE

## 2024-08-04 PROCEDURE — 99284 EMERGENCY DEPT VISIT MOD MDM: CPT | Performed by: EMERGENCY MEDICINE

## 2024-08-04 PROCEDURE — 96361 HYDRATE IV INFUSION ADD-ON: CPT | Performed by: EMERGENCY MEDICINE

## 2024-08-04 PROCEDURE — 258N000003 HC RX IP 258 OP 636: Performed by: EMERGENCY MEDICINE

## 2024-08-04 PROCEDURE — 96375 TX/PRO/DX INJ NEW DRUG ADDON: CPT | Performed by: EMERGENCY MEDICINE

## 2024-08-04 PROCEDURE — 96374 THER/PROPH/DIAG INJ IV PUSH: CPT | Performed by: EMERGENCY MEDICINE

## 2024-08-04 PROCEDURE — 85025 COMPLETE CBC W/AUTO DIFF WBC: CPT | Performed by: EMERGENCY MEDICINE

## 2024-08-04 PROCEDURE — 36592 COLLECT BLOOD FROM PICC: CPT | Performed by: EMERGENCY MEDICINE

## 2024-08-04 PROCEDURE — 96376 TX/PRO/DX INJ SAME DRUG ADON: CPT | Performed by: EMERGENCY MEDICINE

## 2024-08-04 PROCEDURE — 80048 BASIC METABOLIC PNL TOTAL CA: CPT | Performed by: EMERGENCY MEDICINE

## 2024-08-04 PROCEDURE — 250N000011 HC RX IP 250 OP 636: Performed by: EMERGENCY MEDICINE

## 2024-08-04 RX ORDER — KETOROLAC TROMETHAMINE 15 MG/ML
15 INJECTION, SOLUTION INTRAMUSCULAR; INTRAVENOUS ONCE
Status: COMPLETED | OUTPATIENT
Start: 2024-08-04 | End: 2024-08-04

## 2024-08-04 RX ORDER — ONDANSETRON 2 MG/ML
4 INJECTION INTRAMUSCULAR; INTRAVENOUS EVERY 30 MIN PRN
Status: DISCONTINUED | OUTPATIENT
Start: 2024-08-04 | End: 2024-08-04 | Stop reason: HOSPADM

## 2024-08-04 RX ADMIN — SODIUM CHLORIDE, POTASSIUM CHLORIDE, SODIUM LACTATE AND CALCIUM CHLORIDE 1000 ML: 600; 310; 30; 20 INJECTION, SOLUTION INTRAVENOUS at 04:05

## 2024-08-04 RX ADMIN — KETOROLAC TROMETHAMINE 15 MG: 15 INJECTION, SOLUTION INTRAMUSCULAR; INTRAVENOUS at 04:06

## 2024-08-04 RX ADMIN — ONDANSETRON 4 MG: 2 INJECTION INTRAMUSCULAR; INTRAVENOUS at 04:30

## 2024-08-04 RX ADMIN — HYDROMORPHONE HYDROCHLORIDE 1 MG: 1 INJECTION, SOLUTION INTRAMUSCULAR; INTRAVENOUS; SUBCUTANEOUS at 04:10

## 2024-08-04 RX ADMIN — HYDROMORPHONE HYDROCHLORIDE 1 MG: 1 INJECTION, SOLUTION INTRAMUSCULAR; INTRAVENOUS; SUBCUTANEOUS at 06:30

## 2024-08-04 RX ADMIN — HYDROMORPHONE HYDROCHLORIDE 1 MG: 1 INJECTION, SOLUTION INTRAMUSCULAR; INTRAVENOUS; SUBCUTANEOUS at 05:22

## 2024-08-04 ASSESSMENT — ACTIVITIES OF DAILY LIVING (ADL)
ADLS_ACUITY_SCORE: 37
ADLS_ACUITY_SCORE: 35
ADLS_ACUITY_SCORE: 35

## 2024-08-04 NOTE — ED PROVIDER NOTES
ED Provider Note  [facility]      History     Chief Complaint   Patient presents with    Sickle Cell Pain Crisis     Pain in left arm, came out of nowhere, no fall, taking medications. Pain started an hour and a half ago, pain medications taken all day and before pain started.     SHEILA Cervantes is a 24 year old female who presents to us with a chief complaint of sickle cell pain crisis.  She has arm and back pain that came on unexpectedly.  No trauma.  No falls.  No other injuries.  This is very similar to previous sickle cell pain.  No fevers.  No chest pain or shortness of breath.    Past Medical History  Past Medical History:   Diagnosis Date    Anxiety     Bleeding disorder (H24)     Blood clotting disorder (H24)     Cerebral infarction (H) 2015    Cognitive developmental delay     low IQ. 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 side (H)     right hand contractures    Iron overload due to repeated red blood cell transfusions     Migraines     Multiple subsegmental pulmonary emboli without acute cor pulmonale (H) 02/01/2021    Oppositional defiant behavior     Presence of intrauterine contraceptive device 2/18/2020    Superficial venous thrombosis of arm, right 03/25/2021    Uncomplicated asthma      Past Surgical History:   Procedure Laterality Date    AS INSERT TUNNELED CV 2 CATH W/O PORT/PUMP      CHOLECYSTECTOMY      CV RIGHT HEART CATH MEASUREMENTS RECORDED N/A 11/18/2021    Procedure: Right Heart Cath;  Surgeon: Jackson Stauffer MD;  Location:  HEART CARDIAC CATH LAB    INSERT PORT VASCULAR ACCESS Left 4/21/2021    Procedure: INSERTION, VASCULAR ACCESS PORT (NOT SURE ON SIDE UNTIL REMOVAL);  Surgeon: Rajan More MD;  Location: [facility] OR    IR CHEST PORT PLACEMENT > 5 YRS OF AGE  4/21/2021    IR CVC NON TUNNEL LINE REMOVAL  5/6/2021    IR CVC NON  TUNNEL PLACEMENT > 5 YRS  04/07/2020    IR CVC NON TUNNEL PLACEMENT > 5 YRS  4/30/2021    IR CVC NON TUNNEL PLACEMENT > 5 YRS  9/7/2022    IR PORT REMOVAL LEFT  4/21/2021    REMOVE PORT VASCULAR ACCESS Left 4/21/2021    Procedure: REMOVAL, VASCULAR ACCESS PORT LEFT;  Surgeon: Rajan More MD;  Location: [facility] OR    REPAIR TENDON ELBOW Right 10/02/2019    Procedure: Right Elbow Flexor Lengthening, Flexor Pronator Slide Of Wrist and Finger, Thumb Adductor Lengthening;  Surgeon: Anai Franco MD;  Location: [facility] OR    TONSILLECTOMY Bilateral 10/02/2019    Procedure: Bilateral Tonsillectomy;  Surgeon: Farhana Guy MD;  Location: [facility] OR    ZZC BREAST REDUCTION (INCLUDES LIPO) TIER 3 Bilateral 04/23/2019     acetaminophen (TYLENOL) 325 MG tablet  albuterol (PROAIR HFA/PROVENTIL HFA/VENTOLIN HFA) 108 (90 Base) MCG/ACT inhaler  albuterol (PROVENTIL) (2.5 MG/3ML) 0.083% neb solution  aspirin (ASA) 81 MG chewable tablet  deferasirox (JADENU) 360 MG tablet  deferiprone (FERRIPROX) 1000 MG TABS tablet  gabapentin (NEURONTIN) 300 MG capsule  budesonide-formoterol (SYMBICORT) 160-4.5 MCG/ACT Inhaler  EPINEPHrine (ANY BX GENERIC EQUIV) 0.3 MG/0.3ML injection 2-pack  hydroxyurea (HYDREA) 500 MG capsule  ibuprofen (ADVIL/MOTRIN) 600 MG tablet  ibuprofen (ADVIL/MOTRIN) 800 MG tablet  melatonin 5 MG tablet  methocarbamol (ROBAXIN) 750 MG tablet  naloxone (NARCAN) 4 MG/0.1ML nasal spray  naloxone (NARCAN) 4 MG/0.1ML nasal spray  omeprazole (PRILOSEC) 20 MG DR capsule  ondansetron (ZOFRAN ODT) 8 MG ODT tab  oxyCODONE IR (ROXICODONE) 10 MG tablet      Allergies   Allergen Reactions    Contrast Dye      Hives and breathing issues    Fish-Derived Products Hives    Seafood Hives    Adhesive Tape Hives     Primipore dressing causes hives    Gadolinium     Iodinated Contrast Media      Family History  Family History   Problem Relation Age of Onset    Sickle Cell Trait Mother     Hypertension Mother     Asthma Mother      "Sickle Cell Trait Father     Glaucoma No family hx of     Macular Degeneration No family hx of     Diabetes No family hx of     Gout No family hx of      Social History   Social History     Tobacco Use    Smoking status: Never     Passive exposure: Never    Smokeless tobacco: Never   Substance Use Topics    Alcohol use: Not Currently     Alcohol/week: 0.0 standard drinks of alcohol    Drug use: Never      A medically appropriate review of systems was performed with pertinent positives and negatives noted in the HPI, and all other systems negative.    Physical Exam   BP: 108/69  Pulse: 88  Temp: 97.9  F (36.6  C)  Resp: 16  Height: 162.6 cm (5' 4\")  Weight: 68 kg (150 lb)  SpO2: 92 %  Physical Exam  Constitutional:       General: She is not in acute distress.     Appearance: She is not diaphoretic.   HENT:      Head: Normocephalic and atraumatic.      Right Ear: External ear normal.      Left Ear: External ear normal.      Nose: Nose normal.   Eyes:      Extraocular Movements: Extraocular movements intact.      Conjunctiva/sclera: Conjunctivae normal.   Cardiovascular:      Rate and Rhythm: Normal rate and regular rhythm.      Heart sounds: Normal heart sounds.   Pulmonary:      Effort: Pulmonary effort is normal. No respiratory distress.      Breath sounds: Normal breath sounds.   Abdominal:      General: There is no distension.      Palpations: Abdomen is soft.      Tenderness: There is no abdominal tenderness.   Musculoskeletal:         General: No swelling or deformity.      Cervical back: Normal range of motion and neck supple.   Skin:     General: Skin is warm and dry.   Neurological:      Mental Status: Mental status is at baseline.      Comments: Alert, oriented   Psychiatric:         Mood and Affect: Mood normal.         Behavior: Behavior normal.           ED Course, Procedures, & Data      Procedures            Results for orders placed or performed during the hospital encounter of 08/04/24   Basic " metabolic panel     Status: Abnormal   Result Value Ref Range    Sodium 136 135 - 145 mmol/L    Potassium 3.6 3.4 - 5.3 mmol/L    Chloride 102 98 - 107 mmol/L    Carbon Dioxide (CO2) 23 22 - 29 mmol/L    Anion Gap 11 7 - 15 mmol/L    Urea Nitrogen 9.5 6.0 - 20.0 mg/dL    Creatinine 0.57 0.51 - 0.95 mg/dL    GFR Estimate >90 >60 mL/min/1.73m2    Calcium 8.6 (L) 8.8 - 10.4 mg/dL    Glucose 102 (H) 70 - 99 mg/dL   CBC with platelets and differential     Status: Abnormal   Result Value Ref Range    WBC Count 12.1 (H) 4.0 - 11.0 10e3/uL    RBC Count 2.12 (L) 3.80 - 5.20 10e6/uL    Hemoglobin 6.7 (LL) 11.7 - 15.7 g/dL    Hematocrit 18.6 (L) 35.0 - 47.0 %    MCV 88 78 - 100 fL    MCH 31.6 26.5 - 33.0 pg    MCHC 36.0 31.5 - 36.5 g/dL    RDW 28.4 (H) 10.0 - 15.0 %    Platelet Count 402 150 - 450 10e3/uL    % Neutrophils 65 %    % Lymphocytes 21 %    % Monocytes 9 %    % Eosinophils 3 %    % Basophils 1 %    % Immature Granulocytes 1 %    NRBCs per 100 WBC 5 (H) <1 /100    Absolute Neutrophils 7.9 1.6 - 8.3 10e3/uL    Absolute Lymphocytes 2.5 0.8 - 5.3 10e3/uL    Absolute Monocytes 1.1 0.0 - 1.3 10e3/uL    Absolute Eosinophils 0.4 0.0 - 0.7 10e3/uL    Absolute Basophils 0.2 0.0 - 0.2 10e3/uL    Absolute Immature Granulocytes 0.1 <=0.4 10e3/uL    Absolute NRBCs 0.6 10e3/uL   CBC with platelets differential     Status: Abnormal    Narrative    The following orders were created for panel order CBC with platelets differential.  Procedure                               Abnormality         Status                     ---------                               -----------         ------                     CBC with platelets and d...[[id number]]  Abnormal            Final result                 Please view results for these tests on the individual orders.     Medications   ondansetron (ZOFRAN) injection 4 mg (4 mg Intravenous $Given 8/4/24 7349)   HYDROmorphone (DILAUDID) injection 1 mg (1 mg Intravenous $Given 8/4/24 6555)   ketorolac  (TORADOL) injection 15 mg (15 mg Intravenous $Given 8/4/24 7277)   lactated ringers BOLUS 1,000 mL (1,000 mLs Intravenous $New Bag 8/4/24 8069)     Labs Ordered and Resulted from Time of ED Arrival to Time of ED Departure   BASIC METABOLIC PANEL - Abnormal       Result Value    Sodium 136      Potassium 3.6      Chloride 102      Carbon Dioxide (CO2) 23      Anion Gap 11      Urea Nitrogen 9.5      Creatinine 0.57      GFR Estimate >90      Calcium 8.6 (*)     Glucose 102 (*)    CBC WITH PLATELETS AND DIFFERENTIAL - Abnormal    WBC Count 12.1 (*)     RBC Count 2.12 (*)     Hemoglobin 6.7 (*)     Hematocrit 18.6 (*)     MCV 88      MCH 31.6      MCHC 36.0      RDW 28.4 (*)     Platelet Count 402      % Neutrophils 65      % Lymphocytes 21      % Monocytes 9      % Eosinophils 3      % Basophils 1      % Immature Granulocytes 1      NRBCs per 100 WBC 5 (*)     Absolute Neutrophils 7.9      Absolute Lymphocytes 2.5      Absolute Monocytes 1.1      Absolute Eosinophils 0.4      Absolute Basophils 0.2      Absolute Immature Granulocytes 0.1      Absolute NRBCs 0.6       No orders to display          Assessment & Plan    24-year-old female presents to us with a chief complaint of sickle cell pain crisis.  She was given treatment per her hematology established protocol.  Hemoglobin was stable.  Labs are otherwise reassuring.  Will discharge her to follow-up with primary care and her hematologist.    I have reviewed the nursing notes. I have reviewed the findings, diagnosis, plan and need for follow up with the patient.    New Prescriptions    No medications on file       Final diagnoses:   Sickle cell pain crisis (H)         [facility] EMERGENCY DEPARTMENT  8/4/2024     Mykel Luz DO  08/04/24 0674

## 2024-08-04 NOTE — ED TRIAGE NOTES
Triage Assessment (Adult)       Row Name 08/04/24 0258          Triage Assessment    Airway WDL WDL        Respiratory WDL    Respiratory WDL WDL        Skin Circulation/Temperature WDL    Skin Circulation/Temperature WDL WDL        Cardiac WDL    Cardiac WDL WDL        Peripheral/Neurovascular WDL    Peripheral Neurovascular WDL WDL        Cognitive/Neuro/Behavioral WDL    Cognitive/Neuro/Behavioral WDL WDL

## 2024-08-05 ENCOUNTER — NURSE TRIAGE (OUTPATIENT)
Dept: ONCOLOGY | Facility: CLINIC | Age: 24
End: 2024-08-05
Payer: COMMERCIAL

## 2024-08-05 ENCOUNTER — PATIENT OUTREACH (OUTPATIENT)
Dept: ONCOLOGY | Facility: CLINIC | Age: 24
End: 2024-08-05

## 2024-08-05 ENCOUNTER — PATIENT OUTREACH (OUTPATIENT)
Dept: CARE COORDINATION | Facility: CLINIC | Age: 24
End: 2024-08-05
Payer: COMMERCIAL

## 2024-08-05 ENCOUNTER — INFUSION THERAPY VISIT (OUTPATIENT)
Dept: INFUSION THERAPY | Facility: CLINIC | Age: 24
End: 2024-08-05
Attending: PEDIATRICS
Payer: COMMERCIAL

## 2024-08-05 VITALS
RESPIRATION RATE: 16 BRPM | DIASTOLIC BLOOD PRESSURE: 72 MMHG | HEART RATE: 84 BPM | TEMPERATURE: 98.6 F | OXYGEN SATURATION: 91 % | SYSTOLIC BLOOD PRESSURE: 119 MMHG

## 2024-08-05 DIAGNOSIS — G81.10 SPASTIC HEMIPLEGIA, UNSPECIFIED ETIOLOGY, UNSPECIFIED LATERALITY (H): ICD-10-CM

## 2024-08-05 DIAGNOSIS — D57.00 SICKLE CELL PAIN CRISIS (H): Primary | ICD-10-CM

## 2024-08-05 DIAGNOSIS — D57.00 SICKLE CELL PAIN CRISIS (H): ICD-10-CM

## 2024-08-05 PROCEDURE — 96374 THER/PROPH/DIAG INJ IV PUSH: CPT

## 2024-08-05 PROCEDURE — 96375 TX/PRO/DX INJ NEW DRUG ADDON: CPT

## 2024-08-05 PROCEDURE — 96361 HYDRATE IV INFUSION ADD-ON: CPT

## 2024-08-05 PROCEDURE — 250N000013 HC RX MED GY IP 250 OP 250 PS 637: Performed by: PEDIATRICS

## 2024-08-05 PROCEDURE — 96376 TX/PRO/DX INJ SAME DRUG ADON: CPT

## 2024-08-05 PROCEDURE — 258N000003 HC RX IP 258 OP 636: Performed by: PEDIATRICS

## 2024-08-05 PROCEDURE — 250N000011 HC RX IP 250 OP 636: Performed by: PEDIATRICS

## 2024-08-05 RX ORDER — HEPARIN SODIUM (PORCINE) LOCK FLUSH IV SOLN 100 UNIT/ML 100 UNIT/ML
5 SOLUTION INTRAVENOUS
Status: DISCONTINUED | OUTPATIENT
Start: 2024-08-05 | End: 2024-08-05 | Stop reason: HOSPADM

## 2024-08-05 RX ORDER — DIPHENHYDRAMINE HCL 25 MG
50 CAPSULE ORAL
Status: COMPLETED | OUTPATIENT
Start: 2024-08-05 | End: 2024-08-05

## 2024-08-05 RX ORDER — HEPARIN SODIUM (PORCINE) LOCK FLUSH IV SOLN 100 UNIT/ML 100 UNIT/ML
5 SOLUTION INTRAVENOUS
Status: CANCELLED | OUTPATIENT
Start: 2024-10-01

## 2024-08-05 RX ORDER — ONDANSETRON 2 MG/ML
8 INJECTION INTRAMUSCULAR; INTRAVENOUS EVERY 6 HOURS PRN
Status: DISCONTINUED | OUTPATIENT
Start: 2024-08-05 | End: 2024-08-05 | Stop reason: HOSPADM

## 2024-08-05 RX ORDER — ONDANSETRON 4 MG/1
8 TABLET, FILM COATED ORAL
Status: CANCELLED
Start: 2024-10-01

## 2024-08-05 RX ORDER — DIPHENHYDRAMINE HCL 25 MG
50 CAPSULE ORAL
Status: CANCELLED
Start: 2024-10-01

## 2024-08-05 RX ORDER — KETOROLAC TROMETHAMINE 30 MG/ML
30 INJECTION, SOLUTION INTRAMUSCULAR; INTRAVENOUS ONCE
Status: CANCELLED
Start: 2024-10-01 | End: 2024-10-01

## 2024-08-05 RX ORDER — KETOROLAC TROMETHAMINE 30 MG/ML
30 INJECTION, SOLUTION INTRAMUSCULAR; INTRAVENOUS ONCE
Status: COMPLETED | OUTPATIENT
Start: 2024-08-05 | End: 2024-08-05

## 2024-08-05 RX ORDER — OXYCODONE HYDROCHLORIDE 10 MG/1
10 TABLET ORAL EVERY 4 HOURS PRN
Qty: 15 TABLET | Refills: 0 | Status: SHIPPED | OUTPATIENT
Start: 2024-08-05 | End: 2024-08-12

## 2024-08-05 RX ORDER — HEPARIN SODIUM,PORCINE 10 UNIT/ML
5 VIAL (ML) INTRAVENOUS
Status: CANCELLED | OUTPATIENT
Start: 2024-10-01

## 2024-08-05 RX ORDER — ONDANSETRON 2 MG/ML
8 INJECTION INTRAMUSCULAR; INTRAVENOUS EVERY 6 HOURS PRN
Status: CANCELLED
Start: 2024-10-01

## 2024-08-05 RX ADMIN — DIPHENHYDRAMINE HYDROCHLORIDE 50 MG: 25 CAPSULE ORAL at 12:53

## 2024-08-05 RX ADMIN — HYDROMORPHONE HYDROCHLORIDE 1 MG: 1 INJECTION, SOLUTION INTRAMUSCULAR; INTRAVENOUS; SUBCUTANEOUS at 12:56

## 2024-08-05 RX ADMIN — KETOROLAC TROMETHAMINE 30 MG: 30 INJECTION, SOLUTION INTRAMUSCULAR; INTRAVENOUS at 12:53

## 2024-08-05 RX ADMIN — ONDANSETRON 8 MG: 2 INJECTION INTRAMUSCULAR; INTRAVENOUS at 12:53

## 2024-08-05 RX ADMIN — SODIUM CHLORIDE, POTASSIUM CHLORIDE, SODIUM LACTATE AND CALCIUM CHLORIDE 1000 ML: 600; 310; 30; 20 INJECTION, SOLUTION INTRAVENOUS at 12:53

## 2024-08-05 RX ADMIN — Medication 5 ML: at 15:37

## 2024-08-05 RX ADMIN — HYDROMORPHONE HYDROCHLORIDE 1 MG: 1 INJECTION, SOLUTION INTRAMUSCULAR; INTRAVENOUS; SUBCUTANEOUS at 15:01

## 2024-08-05 RX ADMIN — HYDROMORPHONE HYDROCHLORIDE 1 MG: 1 INJECTION, SOLUTION INTRAMUSCULAR; INTRAVENOUS; SUBCUTANEOUS at 14:00

## 2024-08-05 NOTE — PROGRESS NOTES
Infusion Nursing Note:  Jennifer Cervantes presents today for IV fluids + pain meds.    Patient seen by provider today: No   present during visit today: Not Applicable.    Note: Patient presents for sickle cell pain crisis. Reports 6/10 low back pain upon arrival. Received 1L LR, benadryl, zofran, toradol, IV dilaudid X3 doses. Patient reports pain 2/10 upon discharge. Confirms she has a ride for today.       Intravenous Access:  Implanted Port.    Treatment Conditions:  Not Applicable.      Post Infusion Assessment:  Patient tolerated infusion without incident.  Blood return noted pre and post infusion.  Site patent and intact, free from redness, edema or discomfort.  No evidence of extravasations.  Access discontinued per protocol.       Discharge Plan:   Discharge instructions reviewed with: Patient.  Patient and/or family verbalized understanding of discharge instructions and all questions answered.  AVS to patient via [employer].  Patient will return 8/15 in [place] for next appointment.   Patient discharged in stable condition accompanied by: self.  Departure Mode: Ambulatory.      Patricia Son RN

## 2024-08-05 NOTE — TELEPHONE ENCOUNTER
Jennifer calling and requesting to be added on to [facility].    Call to [facility] to check on availability. Available appt at 1230.     Call to Jennifer to check if she is able to make appt and has her own transportation.

## 2024-08-05 NOTE — PROGRESS NOTES
Social Work - Transportation  [facility]    Data/Intervention:  Patient Name: Jennifer Cervantes Goes By: Jennifer    /Age: 2000 (24 year old)    Referral From: [facility]  Reason for Referral:  support requested for patient transportation needs for today's appointment.  Assessment:  called [employer] to arrange ride through patient's insurance. [employer] arranged  for patient from home with [employer]. Patient will need to call [phone] when ready for return ride home.  Plan: Patient is aware of the transportation plan.  available to assist with any other needs.    CARLOS Chavez,LGUDAY  Hematology/Oncology Social Worker  Phone:[phone] Pager: [phone]

## 2024-08-05 NOTE — TELEPHONE ENCOUNTER
Jennifer calling about help with scheduling for a ride home from [facility]. Pt will also be at [facility] around 12:30pm.     1203 This writer sent message to  to call patient to assist with ride home.

## 2024-08-05 NOTE — TELEPHONE ENCOUNTER
Note was accidentally written on pt's [employer] message on 7/30. Pt called in today at 0701. Note moved from writewith on 7/30 to this new encounter.  Oncology Nurse Triage - Sickle Cell Pain Crisis:  Situation: Jennifer  calling about Sickle Cell Pain Crisis, requesting to be added on for IV fluids and pain medicine     Background:   Patient's last infusion was In ED yesterday  Last clinic visit date:6/19/24 with RONALDO Franco  Does patient have active treatment plan?  Yes     Assessment of Symptoms:  Onset/Duration of symptoms: 1 day     Is it typical sickle cell pain? Yes   Location: lower back  Character: Dull ache           Intensity: 7/10     Any radiation of pain, numbness, tingling, weakness, warmth, swelling, discoloration of arms or legs?No      Fever?No  Chest Pain Present: No   Shortness of breath: No      Other home therapies tried: HEAT/HEATING PAD and WARM BATH   Last home medication taken and when: did not ask     Any Refills Needed?: Yes oxy refill, no SE, will be out today, Q 6H     Does patient have transportation & length of time to get to clinic: Yes 10 minutes     0726: Secure chat sent to Patricia JIMENEZ asking if okay for Jennifer to have infusion today. Per Patricia, okay for pt to come in for IVF/pm today.     Recommendations:   If you do not hear from the infusion center by 2pm then you will not be able to get in for an infusion today. If symptoms worsen while waiting for call back, and/or you experience fever, chills, SOB, chest pain, cough, n/v, dizziness, numbness, swelling, discoloration of extremities, then seek emergency evaluation in Emergency Department.      Pt voiced understanding.     Added to infusion wait list.     0834: [place] has two openings. Discussed with Jennifer and she does not want to go to .

## 2024-08-05 NOTE — PROGRESS NOTES
[facility]: Cancer Care Follow-Up Note                                    Discussion with Patient:                                                      Pt called and requested a referral to she a psychiatrist for potential medication start and management.  She does already have a appt for psychotherapy in March of 2025.  Writer offered to try and get her into therapy sooner and pt declined.  Arranged for follow-up appt with Perla in 10/21/24.  Pt will see Patricia next week and she talked about trying to get off oxy by September.          Goals          General     Pain Management (pt-stated)      Notes - Note created  10/13/2023  3:51 PM by Arely Krueger, RN     Goal Statement: I will establish a plan for preventing and managing pain.  Date Goal set: 10/13/2023  Barriers: disease burden, multiple diagnoses, and transportation  Strengths: motivation, health awareness, and involvement with care team  Date to Achieve By: ongoing  Patient expressed understanding of goal: Yes  Action steps to achieve this goal:  I will take medications as prescribed.   I will call triage with new or worsening pain not controlled by medication.   I will use alternative therapies as directed. (Ice, heat, massage, etc)   I will establish care will palliative care department for ongoing pain management as needed.   I will call triage for medication refills when there are 2-3 days of medication remaining.                 Dates of Treatment:                                                      Infusion given in last 28 days       None            Assessment:                                                        Plan of Care Education Review:   Assessment completed with:: Patient    Plan of Care Education   Diagnosis:: Sickle Cell Disease  Does patient understand diagnosis?: Yes  Tx plan/regimen:: Jr, PRN pain meds/ IVF  Does patient understand treatment plan/regimen?: Yes  Plan of Care:: Lab appointment;MD follow-up appointment;ANDREAS  follow-up appointment;Treatment schedule  When to call provider:: Increased shortness of breath;New/worsening pain;Temperature >100.4F    Evaluation of Learning  Patient Education Provided: Yes  Readiness:: Acceptance  Method:: Explanation  Response:: Verbalizes understanding           Intervention/Education provided during outreach:                                                       IB sent to Perla asking about referral    Patient to follow up as scheduled at next appt  Patient to call/Elivarhart message with updates  Confirmed patient has clinic and triage numbers    Signature:  Arely Krueger RN

## 2024-08-05 NOTE — TELEPHONE ENCOUNTER
Narcotic Refill Request    Medication(s) requested:  oxycodone 10mg  Person Requesting Refill:Jennifer Cervantes  What pain is the medication treating: sickle cell pain, lower back  How is the medication being taken?:10mg every 4hours  Does pt have enough for today? no  Is pain being adequately controlled on the current regimen?: okay, requires intermittent IVF/Pain weekly  Experiencing any side effects from medication?: denies    Date of most recent appointment:  6/19/2024 virtual with Daya next provider visit with Patricia Mantilla CNP  Any No Show Visits:none recently  Next appointment:   8/15/2024. Patricia Mantilla   Last fill date and by whom: Patricia Mantilla CNP   Reviewed: YES      Send to provider: routed to Patricia Mantilla CNP and RNCC Gregory

## 2024-08-06 ENCOUNTER — HOSPITAL ENCOUNTER (EMERGENCY)
Facility: CLINIC | Age: 24
Discharge: HOME | End: 2024-08-07
Attending: EMERGENCY MEDICINE | Admitting: EMERGENCY MEDICINE
Payer: COMMERCIAL

## 2024-08-06 DIAGNOSIS — R10.12 LEFT UPPER QUADRANT ABDOMINAL PAIN: ICD-10-CM

## 2024-08-06 PROCEDURE — 96361 HYDRATE IV INFUSION ADD-ON: CPT | Performed by: EMERGENCY MEDICINE

## 2024-08-06 PROCEDURE — 96374 THER/PROPH/DIAG INJ IV PUSH: CPT | Performed by: EMERGENCY MEDICINE

## 2024-08-06 PROCEDURE — 96376 TX/PRO/DX INJ SAME DRUG ADON: CPT | Performed by: EMERGENCY MEDICINE

## 2024-08-06 PROCEDURE — 96375 TX/PRO/DX INJ NEW DRUG ADDON: CPT | Performed by: EMERGENCY MEDICINE

## 2024-08-06 PROCEDURE — 99284 EMERGENCY DEPT VISIT MOD MDM: CPT | Performed by: EMERGENCY MEDICINE

## 2024-08-06 PROCEDURE — 99284 EMERGENCY DEPT VISIT MOD MDM: CPT | Mod: 25 | Performed by: EMERGENCY MEDICINE

## 2024-08-07 VITALS
WEIGHT: 158 LBS | HEART RATE: 116 BPM | OXYGEN SATURATION: 91 % | DIASTOLIC BLOOD PRESSURE: 77 MMHG | RESPIRATION RATE: 16 BRPM | TEMPERATURE: 97.9 F | BODY MASS INDEX: 26.98 KG/M2 | HEIGHT: 64 IN | SYSTOLIC BLOOD PRESSURE: 124 MMHG

## 2024-08-07 LAB
ALBUMIN SERPL BCG-MCNC: 4.2 G/DL (ref 3.5–5.2)
ALBUMIN UR-MCNC: NEGATIVE MG/DL
ALP SERPL-CCNC: 59 U/L (ref 40–150)
ALT SERPL W P-5'-P-CCNC: 58 U/L (ref 0–50)
ANION GAP SERPL CALCULATED.3IONS-SCNC: 10 MMOL/L (ref 7–15)
APPEARANCE UR: CLEAR
AST SERPL W P-5'-P-CCNC: 77 U/L (ref 0–45)
BASOPHILS # BLD AUTO: ABNORMAL 10*3/UL
BASOPHILS # BLD MANUAL: 0.1 10E3/UL (ref 0–0.2)
BASOPHILS NFR BLD AUTO: ABNORMAL %
BASOPHILS NFR BLD MANUAL: 1 %
BILIRUB SERPL-MCNC: 3.3 MG/DL
BILIRUB UR QL STRIP: NEGATIVE
BUN SERPL-MCNC: 7.1 MG/DL (ref 6–20)
BURR CELLS BLD QL SMEAR: SLIGHT
CALCIUM SERPL-MCNC: 8.3 MG/DL (ref 8.8–10.4)
CHLORIDE SERPL-SCNC: 105 MMOL/L (ref 98–107)
COLOR UR AUTO: YELLOW
CREAT SERPL-MCNC: 0.54 MG/DL (ref 0.51–0.95)
EGFRCR SERPLBLD CKD-EPI 2021: >90 ML/MIN/1.73M2
EOSINOPHIL # BLD AUTO: ABNORMAL 10*3/UL
EOSINOPHIL # BLD MANUAL: 0.5 10E3/UL (ref 0–0.7)
EOSINOPHIL NFR BLD AUTO: ABNORMAL %
EOSINOPHIL NFR BLD MANUAL: 4 %
ERYTHROCYTE [DISTWIDTH] IN BLOOD BY AUTOMATED COUNT: 27.8 % (ref 10–15)
FRAGMENTS BLD QL SMEAR: ABNORMAL
GLUCOSE SERPL-MCNC: 95 MG/DL (ref 70–99)
GLUCOSE UR STRIP-MCNC: NEGATIVE MG/DL
HCO3 SERPL-SCNC: 23 MMOL/L (ref 22–29)
HCT VFR BLD AUTO: 18.6 % (ref 35–47)
HGB BLD-MCNC: 6.8 G/DL (ref 11.7–15.7)
HGB UR QL STRIP: NEGATIVE
IMM GRANULOCYTES # BLD: ABNORMAL 10*3/UL
IMM GRANULOCYTES NFR BLD: ABNORMAL %
KETONES UR STRIP-MCNC: NEGATIVE MG/DL
LEUKOCYTE ESTERASE UR QL STRIP: NEGATIVE
LIPASE SERPL-CCNC: 30 U/L (ref 13–60)
LYMPHOCYTES # BLD AUTO: ABNORMAL 10*3/UL
LYMPHOCYTES # BLD MANUAL: 2.5 10E3/UL (ref 0.8–5.3)
LYMPHOCYTES NFR BLD AUTO: ABNORMAL %
LYMPHOCYTES NFR BLD MANUAL: 20 %
MCH RBC QN AUTO: 31.3 PG (ref 26.5–33)
MCHC RBC AUTO-ENTMCNC: 36.6 G/DL (ref 31.5–36.5)
MCV RBC AUTO: 86 FL (ref 78–100)
MONOCYTES # BLD AUTO: ABNORMAL 10*3/UL
MONOCYTES # BLD MANUAL: 0.2 10E3/UL (ref 0–1.3)
MONOCYTES NFR BLD AUTO: ABNORMAL %
MONOCYTES NFR BLD MANUAL: 2 %
MUCOUS THREADS #/AREA URNS LPF: PRESENT /LPF
MYELOCYTES # BLD MANUAL: 0.2 10E3/UL
MYELOCYTES NFR BLD MANUAL: 2 %
NEUTROPHILS # BLD AUTO: ABNORMAL 10*3/UL
NEUTROPHILS # BLD MANUAL: 8.8 10E3/UL (ref 1.6–8.3)
NEUTROPHILS NFR BLD AUTO: ABNORMAL %
NEUTROPHILS NFR BLD MANUAL: 71 %
NITRATE UR QL: NEGATIVE
NRBC # BLD AUTO: 0.9 10E3/UL
NRBC # BLD AUTO: 1.7 10E3/UL
NRBC BLD AUTO-RTO: 7 /100
NRBC BLD MANUAL-RTO: 14 %
PH UR STRIP: 6 [PH] (ref 5–7)
PLAT MORPH BLD: ABNORMAL
PLATELET # BLD AUTO: 392 10E3/UL (ref 150–450)
POLYCHROMASIA BLD QL SMEAR: SLIGHT
POTASSIUM SERPL-SCNC: 3.7 MMOL/L (ref 3.4–5.3)
PROT SERPL-MCNC: 7 G/DL (ref 6.4–8.3)
RBC # BLD AUTO: 2.17 10E6/UL (ref 3.8–5.2)
RBC MORPH BLD: ABNORMAL
RBC URINE: <1 /HPF
SICKLE CELLS BLD QL SMEAR: ABNORMAL
SODIUM SERPL-SCNC: 138 MMOL/L (ref 135–145)
SP GR UR STRIP: 1.01 (ref 1–1.03)
SQUAMOUS EPITHELIAL: 1 /HPF
TARGETS BLD QL SMEAR: SLIGHT
UROBILINOGEN UR STRIP-MCNC: 6 MG/DL
WBC # BLD AUTO: 12.4 10E3/UL (ref 4–11)
WBC URINE: 2 /HPF

## 2024-08-07 PROCEDURE — 85007 BL SMEAR W/DIFF WBC COUNT: CPT | Performed by: EMERGENCY MEDICINE

## 2024-08-07 PROCEDURE — 36415 COLL VENOUS BLD VENIPUNCTURE: CPT | Performed by: EMERGENCY MEDICINE

## 2024-08-07 PROCEDURE — 258N000003 HC RX IP 258 OP 636: Performed by: EMERGENCY MEDICINE

## 2024-08-07 PROCEDURE — 85027 COMPLETE CBC AUTOMATED: CPT | Performed by: EMERGENCY MEDICINE

## 2024-08-07 PROCEDURE — 250N000011 HC RX IP 250 OP 636: Performed by: EMERGENCY MEDICINE

## 2024-08-07 PROCEDURE — 80053 COMPREHEN METABOLIC PANEL: CPT | Performed by: EMERGENCY MEDICINE

## 2024-08-07 PROCEDURE — 83690 ASSAY OF LIPASE: CPT | Performed by: EMERGENCY MEDICINE

## 2024-08-07 PROCEDURE — 81001 URINALYSIS AUTO W/SCOPE: CPT | Performed by: EMERGENCY MEDICINE

## 2024-08-07 RX ORDER — ONDANSETRON 2 MG/ML
8 INJECTION INTRAMUSCULAR; INTRAVENOUS ONCE
Status: COMPLETED | OUTPATIENT
Start: 2024-08-07 | End: 2024-08-07

## 2024-08-07 RX ORDER — SODIUM CHLORIDE, SODIUM LACTATE, POTASSIUM CHLORIDE, CALCIUM CHLORIDE 600; 310; 30; 20 MG/100ML; MG/100ML; MG/100ML; MG/100ML
INJECTION, SOLUTION INTRAVENOUS CONTINUOUS
Status: DISCONTINUED | OUTPATIENT
Start: 2024-08-07 | End: 2024-08-07 | Stop reason: HOSPADM

## 2024-08-07 RX ORDER — METOCLOPRAMIDE 5 MG/1
5 TABLET ORAL 3 TIMES DAILY PRN
Qty: 45 TABLET | Refills: 0 | Status: SHIPPED | OUTPATIENT
Start: 2024-08-07 | End: 2024-08-22

## 2024-08-07 RX ORDER — KETOROLAC TROMETHAMINE 30 MG/ML
30 INJECTION, SOLUTION INTRAMUSCULAR; INTRAVENOUS ONCE
Status: COMPLETED | OUTPATIENT
Start: 2024-08-07 | End: 2024-08-07

## 2024-08-07 RX ORDER — METOCLOPRAMIDE HYDROCHLORIDE 5 MG/ML
5 INJECTION INTRAMUSCULAR; INTRAVENOUS ONCE
Status: COMPLETED | OUTPATIENT
Start: 2024-08-07 | End: 2024-08-07

## 2024-08-07 RX ORDER — METOCLOPRAMIDE 5 MG/1
5 TABLET ORAL
Qty: 60 TABLET | Refills: 0 | Status: SHIPPED | OUTPATIENT
Start: 2024-08-07 | End: 2024-08-07

## 2024-08-07 RX ADMIN — KETOROLAC TROMETHAMINE 30 MG: 30 INJECTION, SOLUTION INTRAMUSCULAR at 00:29

## 2024-08-07 RX ADMIN — HYDROMORPHONE HYDROCHLORIDE 1 MG: 1 INJECTION, SOLUTION INTRAMUSCULAR; INTRAVENOUS; SUBCUTANEOUS at 03:33

## 2024-08-07 RX ADMIN — SODIUM CHLORIDE, POTASSIUM CHLORIDE, SODIUM LACTATE AND CALCIUM CHLORIDE: 600; 310; 30; 20 INJECTION, SOLUTION INTRAVENOUS at 00:27

## 2024-08-07 RX ADMIN — HYDROMORPHONE HYDROCHLORIDE 1 MG: 1 INJECTION, SOLUTION INTRAMUSCULAR; INTRAVENOUS; SUBCUTANEOUS at 00:28

## 2024-08-07 RX ADMIN — HYDROMORPHONE HYDROCHLORIDE 1 MG: 1 INJECTION, SOLUTION INTRAMUSCULAR; INTRAVENOUS; SUBCUTANEOUS at 01:36

## 2024-08-07 RX ADMIN — ONDANSETRON 8 MG: 2 INJECTION INTRAMUSCULAR; INTRAVENOUS at 00:29

## 2024-08-07 RX ADMIN — METOCLOPRAMIDE HYDROCHLORIDE 5 MG: 5 INJECTION INTRAMUSCULAR; INTRAVENOUS at 01:03

## 2024-08-07 ASSESSMENT — ACTIVITIES OF DAILY LIVING (ADL)
ADLS_ACUITY_SCORE: 37

## 2024-08-07 NOTE — ED TRIAGE NOTES
Triage Assessment (Adult)       Row Name 08/06/24 9160          Triage Assessment    Airway WDL WDL        Respiratory WDL    Respiratory WDL WDL        Skin Circulation/Temperature WDL    Skin Circulation/Temperature WDL WDL        Cardiac WDL    Cardiac WDL WDL        Peripheral/Neurovascular WDL    Peripheral Neurovascular WDL WDL        Cognitive/Neuro/Behavioral WDL    Cognitive/Neuro/Behavioral WDL WDL

## 2024-08-07 NOTE — ED PROVIDER NOTES
[facility] EMERGENCY DEPARTMENT ([facility])    8/06/24      ED PROVIDER NOTE    History     Chief Complaint   Patient presents with    Sickle Cell Pain Crisis     Patient presents with sickle cell pain.     SHEILA Cervantes is a 24 year old female with sickle cell anemia, moderately severe restriction on PFTs, prior CVA, hepatic hemachromatosis, power port in place for infusions who presents to the ED for evaluation off abdominal pain. She states this pain has been ongoing for months and medication is not helping. Pain is primarily on her left side. She has not been able to eat because of the pain. She endorses multiple episodes of vomiting. She tried taking nausea medication, unsure what, but immediately threw this up. She reports this is different than her usual sickle cell pain. No unusual recent foods. No known sick contacts. No recent infectious symptoms. Her typical sickle cell pain is in her arms and back. No other symptoms noted.    Past Medical History  Past Medical History:   Diagnosis Date    Anxiety     Bleeding disorder (H24)     Blood clotting disorder (H24)     Cerebral infarction (H) 2015    Cognitive developmental delay     low IQ. 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 side (H)     right hand contractures    Iron overload due to repeated red blood cell transfusions     Migraines     Multiple subsegmental pulmonary emboli without acute cor pulmonale (H) 02/01/2021    Oppositional defiant behavior     Presence of intrauterine contraceptive device 2/18/2020    Superficial venous thrombosis of arm, right 03/25/2021    Uncomplicated asthma      Past Surgical History:   Procedure Laterality Date    AS INSERT TUNNELED CV 2 CATH W/O PORT/PUMP      CHOLECYSTECTOMY      CV RIGHT HEART CATH MEASUREMENTS RECORDED N/A 11/18/2021    Procedure: Right Heart Cath;  Surgeon:  Jackson Stauffer MD;  Location:  HEART CARDIAC CATH LAB    INSERT PORT VASCULAR ACCESS Left 4/21/2021    Procedure: INSERTION, VASCULAR ACCESS PORT (NOT SURE ON SIDE UNTIL REMOVAL);  Surgeon: Rajan More MD;  Location: [facility] OR    IR CHEST PORT PLACEMENT > 5 YRS OF AGE  4/21/2021    IR CVC NON TUNNEL LINE REMOVAL  5/6/2021    IR CVC NON TUNNEL PLACEMENT > 5 YRS  04/07/2020    IR CVC NON TUNNEL PLACEMENT > 5 YRS  4/30/2021    IR CVC NON TUNNEL PLACEMENT > 5 YRS  9/7/2022    IR PORT REMOVAL LEFT  4/21/2021    REMOVE PORT VASCULAR ACCESS Left 4/21/2021    Procedure: REMOVAL, VASCULAR ACCESS PORT LEFT;  Surgeon: Rajan More MD;  Location: [facility] OR    REPAIR TENDON ELBOW Right 10/02/2019    Procedure: Right Elbow Flexor Lengthening, Flexor Pronator Slide Of Wrist and Finger, Thumb Adductor Lengthening;  Surgeon: Anai Franco MD;  Location: [facility] OR    TONSILLECTOMY Bilateral 10/02/2019    Procedure: Bilateral Tonsillectomy;  Surgeon: Farhana Guy MD;  Location: [facility] OR    ZZC BREAST REDUCTION (INCLUDES LIPO) TIER 3 Bilateral 04/23/2019     acetaminophen (TYLENOL) 325 MG tablet  albuterol (PROAIR HFA/PROVENTIL HFA/VENTOLIN HFA) 108 (90 Base) MCG/ACT inhaler  albuterol (PROVENTIL) (2.5 MG/3ML) 0.083% neb solution  aspirin (ASA) 81 MG chewable tablet  budesonide-formoterol (SYMBICORT) 160-4.5 MCG/ACT Inhaler  deferasirox (JADENU) 360 MG tablet  deferiprone (FERRIPROX) 1000 MG TABS tablet  EPINEPHrine (ANY BX GENERIC EQUIV) 0.3 MG/0.3ML injection 2-pack  gabapentin (NEURONTIN) 300 MG capsule  hydroxyurea (HYDREA) 500 MG capsule  ibuprofen (ADVIL/MOTRIN) 600 MG tablet  ibuprofen (ADVIL/MOTRIN) 800 MG tablet  melatonin 5 MG tablet  methocarbamol (ROBAXIN) 750 MG tablet  naloxone (NARCAN) 4 MG/0.1ML nasal spray  naloxone (NARCAN) 4 MG/0.1ML nasal spray  omeprazole (PRILOSEC) 20 MG DR capsule  ondansetron (ZOFRAN ODT) 8 MG ODT tab  oxyCODONE IR (ROXICODONE) 10 MG tablet      Allergies   Allergen  "Reactions    Contrast Dye      Hives and breathing issues    Fish-Derived Products Hives    Seafood Hives    Adhesive Tape Hives     Primipore dressing causes hives    Gadolinium     Iodinated Contrast Media      Family History  Family History   Problem Relation Age of Onset    Sickle Cell Trait Mother     Hypertension Mother     Asthma Mother     Sickle Cell Trait Father     Glaucoma No family hx of     Macular Degeneration No family hx of     Diabetes No family hx of     Gout No family hx of      Social History   Social History     Tobacco Use    Smoking status: Never     Passive exposure: Never    Smokeless tobacco: Never   Substance Use Topics    Alcohol use: Not Currently     Alcohol/week: 0.0 standard drinks of alcohol    Drug use: Never      Past medical history, past surgical history, medications, allergies, family history, and social history were reviewed with the patient. No additional pertinent items.     A complete review of systems was performed with pertinent positives and negatives noted in the HPI, and all other systems negative.    Physical Exam   BP: 133/78  Pulse: 116  Temp: 98.6  F (37  C)  Resp: 16  Height: 162.6 cm (5' 4\")  Weight: 71.7 kg (158 lb)  SpO2: 90 %  Physical Exam  Vitals and nursing note reviewed.   Constitutional:       General: She is in acute distress.      Appearance: She is well-developed. She is not diaphoretic.   HENT:      Head: Normocephalic and atraumatic.      Mouth/Throat:      Pharynx: No oropharyngeal exudate.   Eyes:      General: No scleral icterus.        Right eye: No discharge.         Left eye: No discharge.      Pupils: Pupils are equal, round, and reactive to light.   Cardiovascular:      Rate and Rhythm: Normal rate and regular rhythm.      Heart sounds: Normal heart sounds. No murmur heard.     No friction rub. No gallop.   Pulmonary:      Effort: Pulmonary effort is normal. No respiratory distress.      Breath sounds: Normal breath sounds. No wheezing. "   Chest:      Chest wall: No tenderness.   Abdominal:      General: Bowel sounds are normal. There is no distension.      Palpations: Abdomen is soft.      Tenderness: There is abdominal tenderness in the left upper quadrant.   Musculoskeletal:         General: No tenderness or deformity. Normal range of motion.      Cervical back: Normal range of motion and neck supple.   Skin:     General: Skin is warm and dry.      Coloration: Skin is not pale.      Findings: No erythema or rash.   Neurological:      Mental Status: She is alert and oriented to person, place, and time.      Cranial Nerves: No cranial nerve deficit.   Psychiatric:         Mood and Affect: Affect is tearful.           ED Course, Procedures, & Data      Procedures                No results found for any visits on 08/06/24.  Medications - No data to display  Labs Ordered and Resulted from Time of ED Arrival to Time of ED Departure - No data to display  No orders to display              Assessment & Plan    This is a 24-year-old female with a history of sickle cell disease who presents with abdominal pain.  This started this morning with no known precipitating factors.  On exam she has upper quadrant tenderness.  Patient tearful, appears uncomfortable.  Lab work shows no acute abnormalities.  Patient was given LR, hydromorphone, ondansetron, ketorolac, and metoclopramide.  Patient was signed out to incoming physician pending reevaluation.    I have reviewed the nursing notes. I have reviewed the findings, diagnosis, plan and need for follow up with the patient.    New Prescriptions    No medications on file       Final diagnoses:   None   Philip NEWMAN, am serving as a trained medical scribe to document services personally performed by Jitendra Brandon DO based on the provider's statements to me on August 6, 2024.  This document has been checked and approved by the attending provider.    IJitendra DO, was physically present and have reviewed  and verified the accuracy of this note documented by Philip Diamond, medical scribe.      Jitendra Brandon DO  [facility] EMERGENCY DEPARTMENT  8/6/2024     Jitendra Brandon DO  08/07/24 0110

## 2024-08-08 ENCOUNTER — INFUSION THERAPY VISIT (OUTPATIENT)
Dept: INFUSION THERAPY | Facility: CLINIC | Age: 24
End: 2024-08-08
Attending: PEDIATRICS
Payer: COMMERCIAL

## 2024-08-08 ENCOUNTER — NURSE TRIAGE (OUTPATIENT)
Dept: ONCOLOGY | Facility: CLINIC | Age: 24
End: 2024-08-08
Payer: COMMERCIAL

## 2024-08-08 VITALS
HEART RATE: 92 BPM | OXYGEN SATURATION: 93 % | DIASTOLIC BLOOD PRESSURE: 64 MMHG | TEMPERATURE: 98.2 F | RESPIRATION RATE: 18 BRPM | SYSTOLIC BLOOD PRESSURE: 115 MMHG

## 2024-08-08 DIAGNOSIS — D57.00 SICKLE CELL PAIN CRISIS (H): Primary | ICD-10-CM

## 2024-08-08 DIAGNOSIS — G81.10 SPASTIC HEMIPLEGIA, UNSPECIFIED ETIOLOGY, UNSPECIFIED LATERALITY (H): ICD-10-CM

## 2024-08-08 PROCEDURE — 96376 TX/PRO/DX INJ SAME DRUG ADON: CPT

## 2024-08-08 PROCEDURE — 250N000011 HC RX IP 250 OP 636: Performed by: PEDIATRICS

## 2024-08-08 PROCEDURE — 250N000013 HC RX MED GY IP 250 OP 250 PS 637: Performed by: PEDIATRICS

## 2024-08-08 PROCEDURE — 96361 HYDRATE IV INFUSION ADD-ON: CPT

## 2024-08-08 PROCEDURE — 96375 TX/PRO/DX INJ NEW DRUG ADDON: CPT

## 2024-08-08 PROCEDURE — 258N000003 HC RX IP 258 OP 636: Performed by: PEDIATRICS

## 2024-08-08 PROCEDURE — 96374 THER/PROPH/DIAG INJ IV PUSH: CPT

## 2024-08-08 RX ORDER — ONDANSETRON 4 MG/1
8 TABLET, ORALLY DISINTEGRATING ORAL
Status: DISCONTINUED | OUTPATIENT
Start: 2024-08-08 | End: 2024-08-08 | Stop reason: HOSPADM

## 2024-08-08 RX ORDER — KETOROLAC TROMETHAMINE 30 MG/ML
30 INJECTION, SOLUTION INTRAMUSCULAR; INTRAVENOUS ONCE
Status: COMPLETED | OUTPATIENT
Start: 2024-08-08 | End: 2024-08-08

## 2024-08-08 RX ORDER — ONDANSETRON 2 MG/ML
8 INJECTION INTRAMUSCULAR; INTRAVENOUS EVERY 6 HOURS PRN
Status: DISCONTINUED | OUTPATIENT
Start: 2024-08-08 | End: 2024-08-08 | Stop reason: HOSPADM

## 2024-08-08 RX ORDER — HEPARIN SODIUM (PORCINE) LOCK FLUSH IV SOLN 100 UNIT/ML 100 UNIT/ML
5 SOLUTION INTRAVENOUS
Status: CANCELLED | OUTPATIENT
Start: 2024-10-01

## 2024-08-08 RX ORDER — KETOROLAC TROMETHAMINE 30 MG/ML
30 INJECTION, SOLUTION INTRAMUSCULAR; INTRAVENOUS ONCE
Status: CANCELLED
Start: 2024-10-01 | End: 2024-10-01

## 2024-08-08 RX ORDER — DIPHENHYDRAMINE HCL 25 MG
50 CAPSULE ORAL
Status: CANCELLED
Start: 2024-10-01

## 2024-08-08 RX ORDER — ONDANSETRON 4 MG/1
8 TABLET, FILM COATED ORAL
Status: CANCELLED
Start: 2024-10-01

## 2024-08-08 RX ORDER — DIPHENHYDRAMINE HCL 25 MG
50 CAPSULE ORAL
Status: COMPLETED | OUTPATIENT
Start: 2024-08-08 | End: 2024-08-08

## 2024-08-08 RX ORDER — HEPARIN SODIUM,PORCINE 10 UNIT/ML
5 VIAL (ML) INTRAVENOUS
Status: CANCELLED | OUTPATIENT
Start: 2024-10-01

## 2024-08-08 RX ORDER — ONDANSETRON 2 MG/ML
8 INJECTION INTRAMUSCULAR; INTRAVENOUS EVERY 6 HOURS PRN
Status: CANCELLED
Start: 2024-10-01

## 2024-08-08 RX ORDER — HEPARIN SODIUM (PORCINE) LOCK FLUSH IV SOLN 100 UNIT/ML 100 UNIT/ML
5 SOLUTION INTRAVENOUS
Status: DISCONTINUED | OUTPATIENT
Start: 2024-08-08 | End: 2024-08-08 | Stop reason: HOSPADM

## 2024-08-08 RX ADMIN — HYDROMORPHONE HYDROCHLORIDE 1 MG: 1 INJECTION, SOLUTION INTRAMUSCULAR; INTRAVENOUS; SUBCUTANEOUS at 10:17

## 2024-08-08 RX ADMIN — HYDROMORPHONE HYDROCHLORIDE 1 MG: 1 INJECTION, SOLUTION INTRAMUSCULAR; INTRAVENOUS; SUBCUTANEOUS at 09:05

## 2024-08-08 RX ADMIN — KETOROLAC TROMETHAMINE 30 MG: 30 INJECTION, SOLUTION INTRAMUSCULAR; INTRAVENOUS at 08:58

## 2024-08-08 RX ADMIN — HYDROMORPHONE HYDROCHLORIDE 1 MG: 1 INJECTION, SOLUTION INTRAMUSCULAR; INTRAVENOUS; SUBCUTANEOUS at 11:04

## 2024-08-08 RX ADMIN — DIPHENHYDRAMINE HYDROCHLORIDE 50 MG: 25 CAPSULE ORAL at 08:59

## 2024-08-08 RX ADMIN — ONDANSETRON 8 MG: 2 INJECTION INTRAMUSCULAR; INTRAVENOUS at 08:58

## 2024-08-08 RX ADMIN — Medication 5 ML: at 11:53

## 2024-08-08 RX ADMIN — SODIUM CHLORIDE, POTASSIUM CHLORIDE, SODIUM LACTATE AND CALCIUM CHLORIDE 1000 ML: 600; 310; 30; 20 INJECTION, SOLUTION INTRAVENOUS at 09:13

## 2024-08-08 NOTE — PROGRESS NOTES
Paged Dr. Duncan with following message: 9:53 AM :   in [facility] for sickle crisis. Patient often desaturates after her second dose of Dilaudid. Could you add O2 with parameters to her treatment plan? JOE Avitia    Paged Dr. Duncan again 10:56 AM D/T no response -  O2 sats at 85% . Received call back.   TORB: place patient on O2 at 2L for O2 sats < 90% and titrate to above 90%. Dr. Duncan/Elena Kelly RN on 8/8/2024 at 10:59 AM      Infusion Nursing Note:  Jennifer Cervantes presents today for Patient presents with:  Infusion: Sickle cell crisis      Patient seen by provider today: No   present during visit today: No    Note: During today's [facility] appointment, orders from Dr. Duncan were completed.  Patient identification verified by name and date of birth.  Assessment completed.  Vitals recorded in Doc Flowsheets.  Patient was provided with education regarding medication/procedure and possible side effects.  Patient verbalized understanding.      Administrations This Visit       diphenhydrAMINE (BENADRYL) capsule 50 mg       Admin Date  08/08/2024 Action  $Given Dose  50 mg Route  Oral Documented By  Elena Kelly RN              HYDROmorphone (DILAUDID) injection 1 mg       Admin Date  08/08/2024 Action  $Given Dose  1 mg Route  Intravenous Documented By  Elena Kelly RN               Admin Date  08/08/2024 Action  $Given Dose  1 mg Route  Intravenous Documented By  Elena Kelly RN               Admin Date  08/08/2024 Action  $Given Dose  1 mg Route  Intravenous Documented By  Elena Kelly RN              ketorolac (TORADOL) injection 30 mg       Admin Date  08/08/2024 Action  $Given Dose  30 mg Route  Intravenous Documented By  Elena Kelly RN              lactated ringers BOLUS 1,000 mL       Admin Date  08/08/2024 Action  $New Bag Dose  1,000 mL Rate  500 mL/hr Route  Intravenous Documented By  Elena Kelly RN              ondansetron (ZOFRAN)  injection 8 mg       Admin Date  08/08/2024 Action  $Given Dose  8 mg Route  Intravenous Documented By  Elena Kelly RN                    Intravenous Access:  Port accessed    Treatment Conditions:  N/A.      Post Infusion Assessment:  Patient tolerated infusion without incident.  Site patent and intact, free from redness, edema or discomfort.  No evidence of extravasations.  Access discontinued per protocol.   Oxygen sats remained low throughout therapy and patient required oxygen to maintain above 90%. Titrated to RA and Pt able to maintain O2 Sats at 93% at time of discharge.   Pain at time of admission 7/10, 4/10 at discharge.      Discharge Plan:   Discharge instructions reviewed with: Patient.  AVS to patient via [email or web address].    Patient will return PRN for next appointment.   Patient discharged in stable condition accompanied by: self. Via medical ride at 1200  Departure Mode: Ambulatory    /81 (BP Location: Left arm, Patient Position: Semi-Short's)   Pulse 92   Temp 98.2  F (36.8  C) (Oral)   Resp 18   LMP  (LMP Unknown)   SpO2 95%   Elena Kelly RN on 7/25/2024 at 11:48 AM  .

## 2024-08-08 NOTE — PATIENT INSTRUCTIONS
Dear Jennifer Cervantes    Thank you for choosing [facility] ([facility] for your infusion.  The following information is a summary of our appointment as well as important reminders.          If you are a transplant patient and require transplant education, please click on this link: [email or web address].    If you have any questions on your upcoming Specialty Infusion appointments, please call scheduling at [phone].  It was a pleasure taking care of you today.    Sincerely,    [facility]  [facility]  [address] [place] [address]  [id number]  Phone:  [phone]

## 2024-08-08 NOTE — TELEPHONE ENCOUNTER
Oncology Nurse Triage - Sickle Cell Pain Crisis:  Situation: Jennifer  calling about Sickle Cell Pain Crisis, requesting to be added on for IV fluids and pain medicine    Background:   Patient's last infusion was in ED yesterday.  Last clinic visit date:06/19/24 with RONALDO Franco  Does patient have active treatment plan?  Yes    Assessment of Symptoms:  Onset/Duration of symptoms: 1 day    Is it typical sickle cell pain? Yes   Location: both legs, in ED yesterday for a totally different reason  Character: Sharp           Intensity: 7/10    Any radiation of pain, numbness, tingling, weakness, warmth, swelling, discoloration of arms or legs?No     Fever?No  Chest Pain Present: No   Shortness of breath: No     Other home therapies tried: HEAT/HEATING PAD and WARM BATH   Last home medication taken and when: ibuprofen at 0600, has oxycodone, but wanted to try ibuprofen first    Any Refills Needed?: No     Does patient have transportation & length of time to get to clinic: Yes 10 min  Has to come in early to get a RX filled.    Recommendations:   If you do not hear from the infusion center by 2pm then you will not be able to get in for an infusion today. If symptoms worsen while waiting for call back, and/or you experience fever, chills, SOB, chest pain, cough, n/v, dizziness, numbness, swelling, discoloration of extremities, then seek emergency evaluation in Emergency Department.     Pt voiced understanding.    0732: Secure chat sent to RONALDO Franco to ask if okay to have pt come in for outpatient IVF/pm even if she had ED visit yesterday.   0733: [facility] can offer pt apt at 0830 on chair 43.  0738: Moisés Stapleton: Okay for pt to come in for infusion today if this is her second visit this week in clinic.  0739: Called Jennifer and offered apt. She is on her way to the clinic and confirmed that she can come for the 0830 apt.   0741: Message sent to [facility] to schedule apt today in [facility] at 0830.  Updated infusion Charge  Nurse.    Routed to Care Team.

## 2024-08-12 ENCOUNTER — NURSE TRIAGE (OUTPATIENT)
Dept: ONCOLOGY | Facility: CLINIC | Age: 24
End: 2024-08-12
Payer: COMMERCIAL

## 2024-08-12 ENCOUNTER — PATIENT OUTREACH (OUTPATIENT)
Dept: CARE COORDINATION | Facility: CLINIC | Age: 24
End: 2024-08-12
Payer: COMMERCIAL

## 2024-08-12 ENCOUNTER — INFUSION THERAPY VISIT (OUTPATIENT)
Dept: INFUSION THERAPY | Facility: CLINIC | Age: 24
End: 2024-08-12
Attending: PEDIATRICS
Payer: COMMERCIAL

## 2024-08-12 VITALS
SYSTOLIC BLOOD PRESSURE: 129 MMHG | RESPIRATION RATE: 16 BRPM | DIASTOLIC BLOOD PRESSURE: 78 MMHG | TEMPERATURE: 97.4 F | HEART RATE: 92 BPM | OXYGEN SATURATION: 96 %

## 2024-08-12 DIAGNOSIS — D57.00 SICKLE CELL PAIN CRISIS (H): ICD-10-CM

## 2024-08-12 DIAGNOSIS — G81.10 SPASTIC HEMIPLEGIA, UNSPECIFIED ETIOLOGY, UNSPECIFIED LATERALITY (H): ICD-10-CM

## 2024-08-12 DIAGNOSIS — D57.00 SICKLE CELL PAIN CRISIS (H): Primary | ICD-10-CM

## 2024-08-12 PROCEDURE — 96375 TX/PRO/DX INJ NEW DRUG ADDON: CPT

## 2024-08-12 PROCEDURE — 96376 TX/PRO/DX INJ SAME DRUG ADON: CPT

## 2024-08-12 PROCEDURE — 258N000003 HC RX IP 258 OP 636: Performed by: PEDIATRICS

## 2024-08-12 PROCEDURE — 250N000011 HC RX IP 250 OP 636: Performed by: PEDIATRICS

## 2024-08-12 PROCEDURE — 96374 THER/PROPH/DIAG INJ IV PUSH: CPT

## 2024-08-12 PROCEDURE — 96361 HYDRATE IV INFUSION ADD-ON: CPT

## 2024-08-12 PROCEDURE — 250N000013 HC RX MED GY IP 250 OP 250 PS 637: Performed by: PEDIATRICS

## 2024-08-12 RX ORDER — OXYCODONE HYDROCHLORIDE 10 MG/1
10 TABLET ORAL EVERY 4 HOURS PRN
Qty: 15 TABLET | Refills: 0 | Status: SHIPPED | OUTPATIENT
Start: 2024-08-12 | End: 2024-08-15

## 2024-08-12 RX ORDER — HEPARIN SODIUM (PORCINE) LOCK FLUSH IV SOLN 100 UNIT/ML 100 UNIT/ML
5 SOLUTION INTRAVENOUS
Status: DISCONTINUED | OUTPATIENT
Start: 2024-08-12 | End: 2024-08-12 | Stop reason: HOSPADM

## 2024-08-12 RX ORDER — ONDANSETRON 4 MG/1
8 TABLET, FILM COATED ORAL
Status: CANCELLED
Start: 2024-10-01

## 2024-08-12 RX ORDER — DIPHENHYDRAMINE HCL 25 MG
50 CAPSULE ORAL
Status: CANCELLED
Start: 2024-10-01

## 2024-08-12 RX ORDER — KETOROLAC TROMETHAMINE 30 MG/ML
30 INJECTION, SOLUTION INTRAMUSCULAR; INTRAVENOUS ONCE
Status: COMPLETED | OUTPATIENT
Start: 2024-08-12 | End: 2024-08-12

## 2024-08-12 RX ORDER — ONDANSETRON 2 MG/ML
8 INJECTION INTRAMUSCULAR; INTRAVENOUS EVERY 6 HOURS PRN
Status: DISCONTINUED | OUTPATIENT
Start: 2024-08-12 | End: 2024-08-12 | Stop reason: HOSPADM

## 2024-08-12 RX ORDER — HEPARIN SODIUM,PORCINE 10 UNIT/ML
5 VIAL (ML) INTRAVENOUS
Status: CANCELLED | OUTPATIENT
Start: 2024-10-01

## 2024-08-12 RX ORDER — HEPARIN SODIUM (PORCINE) LOCK FLUSH IV SOLN 100 UNIT/ML 100 UNIT/ML
5 SOLUTION INTRAVENOUS
Status: CANCELLED | OUTPATIENT
Start: 2024-10-01

## 2024-08-12 RX ORDER — ONDANSETRON 2 MG/ML
8 INJECTION INTRAMUSCULAR; INTRAVENOUS EVERY 6 HOURS PRN
Status: CANCELLED
Start: 2024-10-01

## 2024-08-12 RX ORDER — DIPHENHYDRAMINE HCL 25 MG
50 CAPSULE ORAL
Status: COMPLETED | OUTPATIENT
Start: 2024-08-12 | End: 2024-08-12

## 2024-08-12 RX ORDER — KETOROLAC TROMETHAMINE 30 MG/ML
30 INJECTION, SOLUTION INTRAMUSCULAR; INTRAVENOUS ONCE
Status: CANCELLED
Start: 2024-10-01 | End: 2024-10-01

## 2024-08-12 RX ADMIN — HYDROMORPHONE HYDROCHLORIDE 1 MG: 1 INJECTION, SOLUTION INTRAMUSCULAR; INTRAVENOUS; SUBCUTANEOUS at 14:21

## 2024-08-12 RX ADMIN — HYDROMORPHONE HYDROCHLORIDE 1 MG: 1 INJECTION, SOLUTION INTRAMUSCULAR; INTRAVENOUS; SUBCUTANEOUS at 15:25

## 2024-08-12 RX ADMIN — KETOROLAC TROMETHAMINE 30 MG: 30 INJECTION, SOLUTION INTRAMUSCULAR; INTRAVENOUS at 14:21

## 2024-08-12 RX ADMIN — HYDROMORPHONE HYDROCHLORIDE 1 MG: 1 INJECTION, SOLUTION INTRAMUSCULAR; INTRAVENOUS; SUBCUTANEOUS at 16:19

## 2024-08-12 RX ADMIN — ONDANSETRON 8 MG: 2 INJECTION INTRAMUSCULAR; INTRAVENOUS at 14:21

## 2024-08-12 RX ADMIN — DIPHENHYDRAMINE HYDROCHLORIDE 25 MG: 25 CAPSULE ORAL at 14:20

## 2024-08-12 RX ADMIN — Medication 5 ML: at 16:32

## 2024-08-12 RX ADMIN — SODIUM CHLORIDE, POTASSIUM CHLORIDE, SODIUM LACTATE AND CALCIUM CHLORIDE 1000 ML: 600; 310; 30; 20 INJECTION, SOLUTION INTRAVENOUS at 14:15

## 2024-08-12 ASSESSMENT — PAIN SCALES - GENERAL: PAINLEVEL: EXTREME PAIN (8)

## 2024-08-12 NOTE — TELEPHONE ENCOUNTER
Oncology Nurse Triage - Sickle Cell Pain Crisis:    Situation: Jennifer  calling about Sickle Cell Pain Crisis, requesting to be added on for IV fluids and pain medicine    Background:     Patient's last infusion was 8/8/2024  Last clinic visit date:6/19/2024 Daya Nascimento  Does patient have active treatment plan?  Yes      Assessment of Symptoms:  Onset/Duration of symptoms: 3 day    Is it typical sickle cell pain? Yes   Location: arms and legs  Character: Sharp           Intensity: 7/10    Any radiation of pain, numbness, tingling, weakness, warmth, swelling, discoloration of arms or legs?No     Fever?No     Chest Pain Present: No     Shortness of breath: No     Other home therapies tried: HEAT/HEATING PAD and WARM BATH     Last home medication taken and when: Yesterday oxycodone    Any Refills Needed?: Yes , oxycodone 10mg every 4 hours, pharmacy  [facility].     Does patient have transportation & length of time to get to clinic: Yes has ride to clinic, would need help with ride home.     Recommendations:   Meets protocol     0744 Offer for 1:30pm appt with [facility] infusion for IVF/Pain. Jennifer in agreement, verified has ride to clinic but needs assistance with ride home.     0749 Scheduling request sent.  request sent to call patient to assist with ride home details.     Please note, if you are late for your appt, you risk losing your infusion appt as it may delay another patient's infusion who arrived on time.

## 2024-08-12 NOTE — PROGRESS NOTES
Infusion Nursing Note:  Jennifer Cervantes presents today for   Chief Complaint   Patient presents with    Infusion     IV hydration, analgesia, anti-emetics       Patient seen by provider today: No   present during visit today: Not Applicable.    Note:   -Orders from Eric Duncan MD completed. Frequency: as needed, via triage.  -1L LR IV over 2hrs 15min.  -25mg Benadryl PO.  -8mg Zofran IVP.  -30mg Toradol IVP.  -1mg Dilaudid IVP hourly x3.  -Pain at start of infusion 8/10, generalized. Pain at discharge 5/10.    Intravenous Access:  Implanted Port accessed by RN.    Treatment Conditions:  Not Applicable.    Discharge Plan:   Discharge instructions reviewed with: Patient.  Patient and/or family verbalized understanding of discharge instructions and all questions answered.  AVS to patient via [employer].      Patient will call triage as needed for next appt.    Report given to late RN at 1625.  Care transferred at that time.        Roz Kaur RN    /78 (BP Location: Left arm, Cuff Size: Adult Regular)   Pulse 92   Temp 97.4  F (36.3  C)   Resp 16   LMP  (LMP Unknown)   SpO2 96%     Administrations This Visit       diphenhydrAMINE (BENADRYL) capsule 50 mg       Admin Date  08/12/2024 Action  $Given Dose  25 mg Route  Oral Documented By  Roz Kaur RN              heparin lock flush 100 unit/mL injection 5 mL       Admin Date  08/12/2024 Action  $Given Dose  5 mL Route  Intracatheter Documented By  Roz Arellano RN              HYDROmorphone (DILAUDID) injection 1 mg       Admin Date  08/12/2024 Action  $Given Dose  1 mg Route  Intravenous Documented By  Roz Kaur RN               Admin Date  08/12/2024 Action  $Given Dose  1 mg Route  Intravenous Documented By  Roz Kaur RN               Admin Date  08/12/2024 Action  $Given Dose  1 mg Route  Intravenous Documented By  Roz Kaur RN              ketorolac (TORADOL) injection 30 mg       Admin Date  08/12/2024 Action  $Given  Dose  30 mg Route  Intravenous Documented By  Roz Kaur, RN              lactated ringers BOLUS 1,000 mL       Admin Date  08/12/2024 Action  $New Bag Dose  1,000 mL Rate  500 mL/hr Route  Intravenous Documented By  Roz Kaur, JOE              ondansetron (ZOFRAN) injection 8 mg       Admin Date  08/12/2024 Action  $Given Dose  8 mg Route  Intravenous Documented By  Roz Kaur, RN

## 2024-08-12 NOTE — TELEPHONE ENCOUNTER
Narcotic Refill Request    Medication(s) requested:  oxycodone  Person Requesting Refill:Jennifer Cervantes  What pain is the medication treating: sickle cell pain  How is the medication being taken?:10mg every 4hours for crisis pain  Does pt have enough for today? no  Is pain being adequately controlled on the current regimen?: okay, requires intermittent IVF/Pain at times  Experiencing any side effects from medication?: denies    Date of most recent appointment:  6/19/2024 Daya Nascimento PA-C  Any No Show Visits:none recently  Next appointment:   8/15/2024 Patricia Mantilla CNP  Last fill date and by whom:  Patricia Mantilla CNP on 8/5/2024.    Reviewed: YES      Send to provider: Patricia Mantilla CNP

## 2024-08-12 NOTE — PROGRESS NOTES
Social Work - Transportation  [facility]    Data/Intervention:  Patient Name: Jennifer Cervantes Goes By: Jennifer    /Age: 2000 (24 year old)    Referral From: [facility]  Reason for Referral:  support requested for patient transportation needs for today's appointment.  Assessment:  called [employer] to arrange ride through patient's insurance. [employer] arranged  for patient from home with [employer]. Patient will need to call [phone] when ready for return ride home.  Plan: Patient is aware of the transportation plan.  available to assist with any other needs.    CARLOS Chavez,LGUDAY  Hematology/Oncology Social Worker  Phone:[phone] Pager: [phone]

## 2024-08-14 RX ORDER — HEPARIN SODIUM,PORCINE 10 UNIT/ML
5 VIAL (ML) INTRAVENOUS
Status: CANCELLED | OUTPATIENT
Start: 2024-08-15

## 2024-08-14 RX ORDER — MEPERIDINE HYDROCHLORIDE 25 MG/ML
25 INJECTION INTRAMUSCULAR; INTRAVENOUS; SUBCUTANEOUS EVERY 30 MIN PRN
Status: CANCELLED | OUTPATIENT
Start: 2024-08-15

## 2024-08-14 RX ORDER — EPINEPHRINE 1 MG/ML
0.3 INJECTION, SOLUTION INTRAMUSCULAR; SUBCUTANEOUS EVERY 5 MIN PRN
Status: CANCELLED | OUTPATIENT
Start: 2024-08-15

## 2024-08-14 RX ORDER — HEPARIN SODIUM (PORCINE) LOCK FLUSH IV SOLN 100 UNIT/ML 100 UNIT/ML
5 SOLUTION INTRAVENOUS
Status: CANCELLED | OUTPATIENT
Start: 2024-08-15

## 2024-08-14 RX ORDER — DIPHENHYDRAMINE HYDROCHLORIDE 50 MG/ML
50 INJECTION INTRAMUSCULAR; INTRAVENOUS
Status: CANCELLED
Start: 2024-08-15

## 2024-08-14 RX ORDER — METHYLPREDNISOLONE SODIUM SUCCINATE 125 MG/2ML
125 INJECTION, POWDER, LYOPHILIZED, FOR SOLUTION INTRAMUSCULAR; INTRAVENOUS
Status: CANCELLED
Start: 2024-08-15

## 2024-08-14 RX ORDER — ALBUTEROL SULFATE 90 UG/1
1-2 AEROSOL, METERED RESPIRATORY (INHALATION)
Status: CANCELLED
Start: 2024-08-15

## 2024-08-14 RX ORDER — ALBUTEROL SULFATE 0.83 MG/ML
2.5 SOLUTION RESPIRATORY (INHALATION)
Status: CANCELLED | OUTPATIENT
Start: 2024-08-15

## 2024-08-15 ENCOUNTER — ONCOLOGY VISIT (OUTPATIENT)
Dept: ONCOLOGY | Facility: CLINIC | Age: 24
End: 2024-08-15
Attending: PEDIATRICS
Payer: COMMERCIAL

## 2024-08-15 ENCOUNTER — APPOINTMENT (OUTPATIENT)
Dept: LAB | Facility: CLINIC | Age: 24
End: 2024-08-15
Attending: PEDIATRICS
Payer: COMMERCIAL

## 2024-08-15 ENCOUNTER — NURSE TRIAGE (OUTPATIENT)
Dept: ONCOLOGY | Facility: CLINIC | Age: 24
End: 2024-08-15
Payer: COMMERCIAL

## 2024-08-15 VITALS
OXYGEN SATURATION: 96 % | TEMPERATURE: 97.9 F | WEIGHT: 155.1 LBS | DIASTOLIC BLOOD PRESSURE: 78 MMHG | BODY MASS INDEX: 26.62 KG/M2 | HEART RATE: 89 BPM | RESPIRATION RATE: 18 BRPM | SYSTOLIC BLOOD PRESSURE: 124 MMHG

## 2024-08-15 VITALS
HEART RATE: 86 BPM | RESPIRATION RATE: 16 BRPM | TEMPERATURE: 97.7 F | SYSTOLIC BLOOD PRESSURE: 100 MMHG | DIASTOLIC BLOOD PRESSURE: 63 MMHG | OXYGEN SATURATION: 96 %

## 2024-08-15 DIAGNOSIS — D57.1 HB-SS DISEASE WITHOUT CRISIS (H): Primary | ICD-10-CM

## 2024-08-15 DIAGNOSIS — F32.A DEPRESSIVE DISORDER: ICD-10-CM

## 2024-08-15 DIAGNOSIS — F41.0 GENERALIZED ANXIETY DISORDER WITH PANIC ATTACKS: ICD-10-CM

## 2024-08-15 DIAGNOSIS — F41.1 GENERALIZED ANXIETY DISORDER WITH PANIC ATTACKS: ICD-10-CM

## 2024-08-15 DIAGNOSIS — G81.10 SPASTIC HEMIPLEGIA, UNSPECIFIED ETIOLOGY, UNSPECIFIED LATERALITY (H): ICD-10-CM

## 2024-08-15 DIAGNOSIS — D57.00 SICKLE CELL PAIN CRISIS (H): Primary | ICD-10-CM

## 2024-08-15 DIAGNOSIS — R10.13 EPIGASTRIC PAIN: ICD-10-CM

## 2024-08-15 DIAGNOSIS — G89.29 OTHER CHRONIC PAIN: ICD-10-CM

## 2024-08-15 DIAGNOSIS — D57.00 SICKLE CELL PAIN CRISIS (H): ICD-10-CM

## 2024-08-15 LAB
ANION GAP SERPL CALCULATED.3IONS-SCNC: 10 MMOL/L (ref 7–15)
BASOPHILS # BLD AUTO: 0.2 10E3/UL (ref 0–0.2)
BASOPHILS NFR BLD AUTO: 2 %
BUN SERPL-MCNC: 14.5 MG/DL (ref 6–20)
CALCIUM SERPL-MCNC: 8.7 MG/DL (ref 8.8–10.4)
CHLORIDE SERPL-SCNC: 107 MMOL/L (ref 98–107)
CREAT SERPL-MCNC: 0.58 MG/DL (ref 0.51–0.95)
EGFRCR SERPLBLD CKD-EPI 2021: >90 ML/MIN/1.73M2
EOSINOPHIL # BLD AUTO: 0.4 10E3/UL (ref 0–0.7)
EOSINOPHIL NFR BLD AUTO: 4 %
ERYTHROCYTE [DISTWIDTH] IN BLOOD BY AUTOMATED COUNT: 24.7 % (ref 10–15)
GLUCOSE SERPL-MCNC: 85 MG/DL (ref 70–99)
HCO3 SERPL-SCNC: 21 MMOL/L (ref 22–29)
HCT VFR BLD AUTO: 19 % (ref 35–47)
HGB BLD-MCNC: 6.8 G/DL (ref 11.7–15.7)
IMM GRANULOCYTES # BLD: 0.1 10E3/UL
IMM GRANULOCYTES NFR BLD: 1 %
LYMPHOCYTES # BLD AUTO: 1.7 10E3/UL (ref 0.8–5.3)
LYMPHOCYTES NFR BLD AUTO: 16 %
MCH RBC QN AUTO: 30.8 PG (ref 26.5–33)
MCHC RBC AUTO-ENTMCNC: 35.8 G/DL (ref 31.5–36.5)
MCV RBC AUTO: 86 FL (ref 78–100)
MONOCYTES # BLD AUTO: 1.1 10E3/UL (ref 0–1.3)
MONOCYTES NFR BLD AUTO: 10 %
NEUTROPHILS # BLD AUTO: 7.5 10E3/UL (ref 1.6–8.3)
NEUTROPHILS NFR BLD AUTO: 67 %
NRBC # BLD AUTO: 0.3 10E3/UL
NRBC BLD AUTO-RTO: 2 /100
PLATELET # BLD AUTO: 373 10E3/UL (ref 150–450)
POTASSIUM SERPL-SCNC: 4 MMOL/L (ref 3.4–5.3)
RBC # BLD AUTO: 2.21 10E6/UL (ref 3.8–5.2)
RETICS # AUTO: 0.42 10E6/UL (ref 0.03–0.1)
RETICS/RBC NFR AUTO: 18.1 % (ref 0.5–2)
SODIUM SERPL-SCNC: 138 MMOL/L (ref 135–145)
WBC # BLD AUTO: 11.1 10E3/UL (ref 4–11)

## 2024-08-15 PROCEDURE — 96361 HYDRATE IV INFUSION ADD-ON: CPT

## 2024-08-15 PROCEDURE — 99215 OFFICE O/P EST HI 40 MIN: CPT | Performed by: REGISTERED NURSE

## 2024-08-15 PROCEDURE — 250N000011 HC RX IP 250 OP 636: Performed by: PEDIATRICS

## 2024-08-15 PROCEDURE — 96413 CHEMO IV INFUSION 1 HR: CPT

## 2024-08-15 PROCEDURE — 250N000011 HC RX IP 250 OP 636: Performed by: REGISTERED NURSE

## 2024-08-15 PROCEDURE — 250N000013 HC RX MED GY IP 250 OP 250 PS 637: Performed by: PEDIATRICS

## 2024-08-15 PROCEDURE — G0463 HOSPITAL OUTPT CLINIC VISIT: HCPCS | Mod: 25 | Performed by: REGISTERED NURSE

## 2024-08-15 PROCEDURE — 36591 DRAW BLOOD OFF VENOUS DEVICE: CPT | Performed by: PEDIATRICS

## 2024-08-15 PROCEDURE — 99417 PROLNG OP E/M EACH 15 MIN: CPT | Performed by: REGISTERED NURSE

## 2024-08-15 PROCEDURE — 258N000003 HC RX IP 258 OP 636: Performed by: PEDIATRICS

## 2024-08-15 PROCEDURE — 96376 TX/PRO/DX INJ SAME DRUG ADON: CPT

## 2024-08-15 PROCEDURE — 85045 AUTOMATED RETICULOCYTE COUNT: CPT | Performed by: PEDIATRICS

## 2024-08-15 PROCEDURE — 96365 THER/PROPH/DIAG IV INF INIT: CPT

## 2024-08-15 PROCEDURE — 85025 COMPLETE CBC W/AUTO DIFF WBC: CPT | Performed by: PEDIATRICS

## 2024-08-15 PROCEDURE — G2211 COMPLEX E/M VISIT ADD ON: HCPCS | Performed by: REGISTERED NURSE

## 2024-08-15 PROCEDURE — 96375 TX/PRO/DX INJ NEW DRUG ADDON: CPT

## 2024-08-15 PROCEDURE — 80048 BASIC METABOLIC PNL TOTAL CA: CPT | Performed by: PEDIATRICS

## 2024-08-15 RX ORDER — KETOROLAC TROMETHAMINE 30 MG/ML
30 INJECTION, SOLUTION INTRAMUSCULAR; INTRAVENOUS ONCE
Status: CANCELLED
Start: 2024-10-01 | End: 2024-10-01

## 2024-08-15 RX ORDER — DIPHENHYDRAMINE HCL 25 MG
50 CAPSULE ORAL
Status: CANCELLED
Start: 2024-10-01

## 2024-08-15 RX ORDER — KETOROLAC TROMETHAMINE 30 MG/ML
30 INJECTION, SOLUTION INTRAMUSCULAR; INTRAVENOUS ONCE
Status: COMPLETED | OUTPATIENT
Start: 2024-08-15 | End: 2024-08-15

## 2024-08-15 RX ORDER — HEPARIN SODIUM,PORCINE 10 UNIT/ML
5 VIAL (ML) INTRAVENOUS
Status: CANCELLED | OUTPATIENT
Start: 2024-10-01

## 2024-08-15 RX ORDER — HEPARIN SODIUM (PORCINE) LOCK FLUSH IV SOLN 100 UNIT/ML 100 UNIT/ML
5 SOLUTION INTRAVENOUS ONCE
Status: COMPLETED | OUTPATIENT
Start: 2024-08-15 | End: 2024-08-15

## 2024-08-15 RX ORDER — ONDANSETRON 4 MG/1
8 TABLET, FILM COATED ORAL
Status: CANCELLED
Start: 2024-10-01

## 2024-08-15 RX ORDER — DIPHENHYDRAMINE HCL 25 MG
50 CAPSULE ORAL
Status: COMPLETED | OUTPATIENT
Start: 2024-08-15 | End: 2024-08-15

## 2024-08-15 RX ORDER — HEPARIN SODIUM (PORCINE) LOCK FLUSH IV SOLN 100 UNIT/ML 100 UNIT/ML
5 SOLUTION INTRAVENOUS
Status: CANCELLED | OUTPATIENT
Start: 2024-10-01

## 2024-08-15 RX ORDER — OXYCODONE HYDROCHLORIDE 10 MG/1
10 TABLET ORAL EVERY 4 HOURS PRN
Status: ON HOLD
Start: 2024-08-15 | End: 2024-08-21

## 2024-08-15 RX ORDER — HEPARIN SODIUM (PORCINE) LOCK FLUSH IV SOLN 100 UNIT/ML 100 UNIT/ML
5 SOLUTION INTRAVENOUS
Status: DISCONTINUED | OUTPATIENT
Start: 2024-08-15 | End: 2024-08-15 | Stop reason: HOSPADM

## 2024-08-15 RX ORDER — ONDANSETRON 2 MG/ML
8 INJECTION INTRAMUSCULAR; INTRAVENOUS EVERY 6 HOURS PRN
Status: DISCONTINUED | OUTPATIENT
Start: 2024-08-15 | End: 2024-08-15 | Stop reason: HOSPADM

## 2024-08-15 RX ORDER — ONDANSETRON 2 MG/ML
8 INJECTION INTRAMUSCULAR; INTRAVENOUS EVERY 6 HOURS PRN
Status: CANCELLED
Start: 2024-10-01

## 2024-08-15 RX ADMIN — SODIUM CHLORIDE 352 MG: 9 INJECTION, SOLUTION INTRAVENOUS at 12:09

## 2024-08-15 RX ADMIN — SODIUM CHLORIDE, POTASSIUM CHLORIDE, SODIUM LACTATE AND CALCIUM CHLORIDE 1000 ML: 600; 310; 30; 20 INJECTION, SOLUTION INTRAVENOUS at 11:06

## 2024-08-15 RX ADMIN — Medication 5 ML: at 09:23

## 2024-08-15 RX ADMIN — SODIUM CHLORIDE 250 ML: 9 INJECTION, SOLUTION INTRAVENOUS at 12:02

## 2024-08-15 RX ADMIN — HYDROMORPHONE HYDROCHLORIDE 1 MG: 1 INJECTION, SOLUTION INTRAMUSCULAR; INTRAVENOUS; SUBCUTANEOUS at 12:05

## 2024-08-15 RX ADMIN — HYDROMORPHONE HYDROCHLORIDE 1 MG: 1 INJECTION, SOLUTION INTRAMUSCULAR; INTRAVENOUS; SUBCUTANEOUS at 11:07

## 2024-08-15 RX ADMIN — Medication 5 ML: at 14:22

## 2024-08-15 RX ADMIN — HYDROMORPHONE HYDROCHLORIDE 1 MG: 1 INJECTION, SOLUTION INTRAMUSCULAR; INTRAVENOUS; SUBCUTANEOUS at 13:11

## 2024-08-15 RX ADMIN — ONDANSETRON 8 MG: 2 INJECTION INTRAMUSCULAR; INTRAVENOUS at 11:06

## 2024-08-15 RX ADMIN — KETOROLAC TROMETHAMINE 30 MG: 30 INJECTION, SOLUTION INTRAMUSCULAR; INTRAVENOUS at 11:07

## 2024-08-15 RX ADMIN — DIPHENHYDRAMINE HYDROCHLORIDE 50 MG: 25 CAPSULE ORAL at 11:07

## 2024-08-15 ASSESSMENT — PAIN SCALES - GENERAL: PAINLEVEL: EXTREME PAIN (8)

## 2024-08-15 NOTE — NURSING NOTE
"Oncology Rooming Note    August 15, 2024 9:57 AM   Jennifer Cervantes is a 24 year old female who presents for:    Chief Complaint   Patient presents with    Port Draw     Labs drawn via port by RN in lab.     Oncology Clinic Visit     Sickle Cell Anemia     Initial Vitals: /78   Pulse 89   Temp 97.9  F (36.6  C) (Oral)   Resp 18   Wt 70.4 kg (155 lb 1.6 oz)   LMP  (LMP Unknown)   SpO2 96%   BMI 26.62 kg/m   Estimated body mass index is 26.62 kg/m  as calculated from the following:    Height as of 8/6/24: 1.626 m (5' 4\").    Weight as of this encounter: 70.4 kg (155 lb 1.6 oz). Body surface area is 1.78 meters squared.  Extreme Pain (8) Comment: Data Unavailable   No LMP recorded (lmp unknown). Patient has had an implant.  Allergies reviewed: Yes  Medications reviewed: Yes    Medications: Medication refills not needed today.  Pharmacy name entered into [employer]:    [facility] - [place], [place] -  [facility] [id number]  [facility] - [place], [place] - [address] [place] [address]    Frailty Screening:   Is the patient here for a new oncology consult visit in cancer care? 2. No      Clinical concerns: Pt rates her pain at 8/10.  This includes her \"entire body\".       Stacey Murphy            "

## 2024-08-15 NOTE — PROGRESS NOTES
"Infusion Nursing Note:  Jennifer Cervantes presents today for Jr/IVF/pain meds.    Patient seen by provider today: Yes: Patricia Mantilla, CNP   present during visit today: Not Applicable.    Note: Pt arrived after visit with Patricia offering no new complaints.  Pt reports, on arrival, that her pain is an \"8\" and \"everywhere.\"      Pt rating her pain \"6\" and \"a little better\" after infusion.      Intravenous Access:  Implanted Port.    Treatment Conditions:  Not Applicable.      Post Infusion Assessment:  Patient tolerated all infusions without incident.  Patient observed for 30 minutes post Jr per protocol.  Blood return noted pre and post infusion.  Site patent and intact, free from redness, edema or discomfort.  No evidence of extravasations.  Access discontinued per protocol.       Discharge Plan:   Patient declined prescription refills.  Discharge instructions reviewed with: Patient.  Patient and/or family verbalized understanding of discharge instructions and all questions answered.  AVS to patient via [employer]HART.  Patient will return 09/13 for next appointment.   Patient discharged in stable condition accompanied by: self.  Departure Mode: Ambulatory.    Administrations This Visit       crizanlizumab-tmca (ADAKVEO) 352 mg in sodium chloride 0.9 % 145.2 mL infusion       Admin Date  08/15/2024 Action  $New Bag Dose  352 mg Rate  145.2 mL/hr Route  Intravenous Documented By  Deanna Tinoco RN              diphenhydrAMINE (BENADRYL) capsule 50 mg       Admin Date  08/15/2024 Action  $Given Dose  50 mg Route  Oral Documented By  Deanna Tinoco RN              heparin lock flush 100 unit/mL injection 5 mL       Admin Date  08/15/2024 Action  $Given Dose  5 mL Route  Intracatheter Documented By  Deanna Tinoco RN              HYDROmorphone (DILAUDID) injection 1 mg       Admin Date  08/15/2024 Action  $Given Dose  1 mg Route  Intravenous Documented By  Deanna Tinoco RN               Admin " Date  08/15/2024 Action  $Given Dose  1 mg Route  Intravenous Documented By  Deanna Tinoco RN               Admin Date  08/15/2024 Action  $Given Dose  1 mg Route  Intravenous Documented By  Deanna Tinoco RN              ketorolac (TORADOL) injection 30 mg       Admin Date  08/15/2024 Action  $Given Dose  30 mg Route  Intravenous Documented By  Deanna Tinoco RN              lactated ringers BOLUS 1,000 mL       Admin Date  08/15/2024 Action  $New Bag Dose  1,000 mL Rate  500 mL/hr Route  Intravenous Documented By  Deanna Tinoco RN              ondansetron (ZOFRAN) injection 8 mg       Admin Date  08/15/2024 Action  $Given Dose  8 mg Route  Intravenous Documented By  Deanna Tinoco RN              sodium chloride (PF) 0.9% PF flush 3-20 mL       Admin Date  08/15/2024 Action  $Given Dose  10 mL Route  Intracatheter Documented By  Deanna Tinoco RN              sodium chloride 0.9% BOLUS 250 mL       Admin Date  08/15/2024 Action  $New Bag Dose  250 mL Route  Intravenous Documented By  Deanna Tinoco RN Carissa L. Draper, RN

## 2024-08-15 NOTE — PROGRESS NOTES
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 She had multiple thromboembolic events in 2021 despite adherent anticoagulation use (though warfarin was perpetually low) and there are concerns for chronic thromboembolic disease but did not have pulmonary HTN on a November 2021 cath. She is maintained on chronic PO opioids and twice-weekly infusion visits (since 1/24/22) but has been able to be maintained on this regimen and has stayed out of the ED most of the time with even rarer admissions for most of 2023.     Interval History:  Jennifer is seen today prior to routine sidney appointment. She has been trying to decrease her ED utilization and has been frustrated because the majority of her ED visits recently have been to address abdominal pain rather than sickle cell pain. She went over 2 weeks without an ED visit which she was proud of (7/19-8/4). Following her most recent ED discharge, she was prescribed metoclopramide which she's been taking twice a day on average. This has been very helpful in minimizing but not resolving her abdominal symptoms. When she started the metoclopramide, she stopped the omeprazole as she wanted to see how she'd feel with only one medication addressing her GI symptoms.    She continues to feel motivated to decreased her opioid use. Today she requests to go from 15 to 10 tablets of oxycodone per weekly refill starting next week. She acknowledges that she receives additional opioid through her IV dilaudid doses in clinic.     She feels that  things are stable although trying at times with her family relationships at home. Her sister recently had a baby girl a few weeks early who will be coming home from the NICU tomorrow.       Sickle Cell Disease Comprehensive Checklist  Bone Health/Avascular Necrosis Screening/Symptoms (each visit): no new concerns today  Leg Ulcer evaluation (every visit): no  Hypertension (every visit):stable 11/3/23  Last pulmonary evaluation (asthma, AMAN, pulm HTN): 9/28/22, due for follow-up  Stroke/silent cerebral infarct Hx (Y/N): Yes TIA ~2014, first event ~age 1 with full stroke and R sided weakness  Last PCP Visit: 7/3/24 with Dr. Case  Vaccines:  PCV13: 5/13/19  Pneumovax (PPSV23): 3/04, 10/09, 7/12/19, due-did not discuss today  Menactra: 4/2010, 9/2015 (MCV done 8/16/21)  MenB: 9/16/15, 5/13/19  Influenza: 10/2/23  Audiology (chelation): done 2/27/24    Comparison to Previous Audiogram dated 6/6/2022:     Pure Tone Thresholds 250-8000 Hz:    RIGHT: stable    LEFT: stable     High Frequency Audiometry 9,000-16,000Hz:     RIGHT: stable    LEFT: stable     Word Recognition Score:    RIGHT: stable    LEFT: stable    Plan last reviewed with patient: 10/2/23    Patient background: 22 yo F, enjoys movies and kids though there are times where she does not really want to talk to people. Does not have a lot of social support at home.     Sickle Cell Disease History  Primary Hematologist Team: Jose Rafael Duncan  PCP: none  Genotype: SS  Acute Pain Crisis Treatment: (limiting IV)   ER   Dilaudid 1 mg IV q1h up to 3 doses  Toradol 30 mg IV x1   Maintenance IV fluids with LR  Other: Zofran 8 mg IV PRN nausea  Inpatient:  PCA Dilaudid 1 hr q30 minutes, no basal rate  Toradol 30 mg x6h x 4 hr  LR maintenance x 1-2 days  Other Medications: Zofran  ASA  Supportive Care: Docusate, Senna  Chronic Pain Medications:    Home regimen: oxycodone 15 mg p.o. q.4-6 hours p.r.n. breakthrough pain.  She also continues on Voltaren gel, and Zoloft  among other medications.    -Also benefits from mental health visits, acupuncture  Baseline Hemoglobin: 7 g/dl without chronic transfusions  Hydroxyurea use: Yes  H/O blood transfusions: Yes, several (iron overload) Most recent 11/20/2021  H/O Transfusion Reactions: no  Antibodies:none  H/O Acute Chest Syndrome: Yes  Last episode:9/05/22 (previously 4/26/21, 10/2019)   ICU/intubation: not with 9/2022 admission  H/O Stroke: Yes (managed with chronic transfusions in the past, stopped late Spring 2020)  H/O VTE: Yes (2/2021)  H/O Cholecystectomy or Splenectomy: no  H/O Asthma, Pulm HTN, AVN, Leg Ulcers, Nephropathy, Retinopathy, etc: Iron overload, asthma, chronic lung disease, physical limitations from early stroke    ---------------------------------------  Jennifer Cervantes's Goals (reviewed today 5/24/24)    1-3 month goal:  Continue to look for a job    6 month goal:      12 month goal:      Disease-specific goal(s):  Decrease frequency of ED visits. Decrease opioid qty per weekly refill. She'd like to get down to 5 tablets per week by October      Current Outpatient Medications   Medication Sig Dispense Refill    oxyCODONE IR (ROXICODONE) 10 MG tablet Take 1 tablet (10 mg) by mouth every 4 hours as needed for severe pain or breakthrough pain Goal 4 per day. Max 6 per day.      acetaminophen (TYLENOL) 325 MG tablet Take 3 tablets (975 mg) by mouth every 8 hours 270 tablet 0    albuterol (PROAIR HFA/PROVENTIL HFA/VENTOLIN HFA) 108 (90 Base) MCG/ACT inhaler Inhale 2 puffs into the lungs every 6 hours as needed for shortness of breath or wheezing 8.5 g 3    albuterol (PROVENTIL) (2.5 MG/3ML) 0.083% neb solution Take 2 vials (5 mg) by nebulization every 6 hours as needed for shortness of breath or wheezing 90 mL 3    aspirin (ASA) 81 MG chewable tablet Take 1 tablet (81 mg) by mouth 2 times daily 60 tablet 11    budesonide-formoterol (SYMBICORT) 160-4.5 MCG/ACT Inhaler Inhale 2 puffs twice daily plus 1-2 puffs as needed.  May use up to 12 puffs per day. 20.4 g 11    deferasirox (JADENU) 360 MG tablet Take 4 tablets (1,440 mg) by mouth every evening 120 tablet 4    deferiprone (FERRIPROX) 1000 MG TABS tablet Take 1 tablet by mouth daily      EPINEPHrine (ANY BX GENERIC EQUIV) 0.3 MG/0.3ML injection 2-pack Inject 0.3 mLs (0.3 mg) into the muscle as needed for anaphylaxis 1 each 1    gabapentin (NEURONTIN) 300 MG capsule Take 1 capsule (300 mg) by mouth 3 times daily Take PO 1 pill in evening (300 mg) TID to start. If tolerated, increase by 300 mg in morning or lunch every few days, updating your doctor's office in time to get refills. The maximum dose is 900 mg TID. 90 capsule 1    hydroxyurea (HYDREA) 500 MG capsule TAKE 6 CAPSULES (3,000 MG) BY MOUTH ONCE DAILY. 180 capsule 3    ibuprofen (ADVIL/MOTRIN) 600 MG tablet Take 600 mg by mouth every 6 hours as needed for moderate pain Using rarely, 2x/week at most      ibuprofen (ADVIL/MOTRIN) 800 MG tablet Take 800 mg by mouth every 8 hours as needed for moderate pain      melatonin 5 MG tablet Take 1 tablet (5 mg) by mouth nightly as needed for sleep 30 tablet 1    methocarbamol (ROBAXIN) 750 MG tablet Take 1 tablet (750 mg) by mouth 4 times daily as needed for muscle spasms (during sickle pain crises. Okay to take scheduled while in pain) 60 tablet 1    metoclopramide (REGLAN) 5 MG tablet Take 1 tablet (5 mg) by mouth 3 times daily as needed (Nausea) 45 tablet 0    naloxone (NARCAN) 4 MG/0.1ML nasal spray Spray 4 mg into one nostril alternating nostrils as needed for opioid reversal every 2-3 minutes until assistance arrives 0.2 mL 0    naloxone (NARCAN) 4 MG/0.1ML nasal spray Spray 4 mg into one nostril alternating nostrils as needed for opioid reversal every 2-3 minutes until assistance arrives      omeprazole (PRILOSEC) 20 MG DR capsule Take 1 capsule (20 mg) by mouth daily 30 capsule 0    ondansetron (ZOFRAN ODT) 8 MG ODT tab Take 1 tablet (8 mg) by mouth every 8 hours as needed for  nausea 40 tablet 4     Past Medical History  Past Medical History:   Diagnosis Date    Anxiety     Bleeding disorder (H24)     Blood clotting disorder (H24)     Cerebral infarction (H) 2015    Cognitive developmental delay     low IQ. 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 side (H)     right hand contractures    Iron overload due to repeated red blood cell transfusions     Migraines     Multiple subsegmental pulmonary emboli without acute cor pulmonale (H) 02/01/2021    Oppositional defiant behavior     Presence of intrauterine contraceptive device 2/18/2020    Superficial venous thrombosis of arm, right 03/25/2021    Uncomplicated asthma      Past Surgical History:   Procedure Laterality Date    AS INSERT TUNNELED CV 2 CATH W/O PORT/PUMP      CHOLECYSTECTOMY      CV RIGHT HEART CATH MEASUREMENTS RECORDED N/A 11/18/2021    Procedure: Right Heart Cath;  Surgeon: Jackson Stauffer MD;  Location:  HEART CARDIAC CATH LAB    INSERT PORT VASCULAR ACCESS Left 4/21/2021    Procedure: INSERTION, VASCULAR ACCESS PORT (NOT SURE ON SIDE UNTIL REMOVAL);  Surgeon: Rajan More MD;  Location: [facility] OR    IR CHEST PORT PLACEMENT > 5 YRS OF AGE  4/21/2021    IR CVC NON TUNNEL LINE REMOVAL  5/6/2021    IR CVC NON TUNNEL PLACEMENT > 5 YRS  04/07/2020    IR CVC NON TUNNEL PLACEMENT > 5 YRS  4/30/2021    IR CVC NON TUNNEL PLACEMENT > 5 YRS  9/7/2022    IR PORT REMOVAL LEFT  4/21/2021    REMOVE PORT VASCULAR ACCESS Left 4/21/2021    Procedure: REMOVAL, VASCULAR ACCESS PORT LEFT;  Surgeon: Rajan More MD;  Location: [facility] OR    REPAIR TENDON ELBOW Right 10/02/2019    Procedure: Right Elbow Flexor Lengthening, Flexor Pronator Slide Of Wrist and Finger, Thumb Adductor Lengthening;  Surgeon: Anai Franco MD;  Location: [facility] OR    TONSILLECTOMY Bilateral 10/02/2019    Procedure: Bilateral  Tonsillectomy;  Surgeon: Farhana Guy MD;  Location:  OR    [facility] BREAST REDUCTION (INCLUDES LIPO) TIER 3 Bilateral 04/23/2019     Allergies   Allergen Reactions    Contrast Dye      Hives and breathing issues    Fish-Derived Products Hives    Seafood Hives    Adhesive Tape Hives     Primipore dressing causes hives    Gadolinium     Iodinated Contrast Media      Social History   Social History     Tobacco Use    Smoking status: Never     Passive exposure: Never    Smokeless tobacco: Never   Substance Use Topics    Alcohol use: Not Currently     Alcohol/week: 0.0 standard drinks of alcohol    Drug use: Never   Her mom lives next door to her.  Past medical history and social history were reviewed.    Physical Examination:  /78   Pulse 89   Temp 97.9  F (36.6  C) (Oral)   Resp 18   Wt 70.4 kg (155 lb 1.6 oz)   LMP  (LMP Unknown)   SpO2 96%   BMI 26.62 kg/m        Wt Readings from Last 10 Encounters:   08/15/24 70.4 kg (155 lb 1.6 oz)   08/06/24 71.7 kg (158 lb)   08/04/24 68 kg (150 lb)   07/19/24 69.4 kg (153 lb)   07/19/24 69.3 kg (152 lb 12.8 oz)   07/17/24 68.6 kg (151 lb 3.2 oz)   07/12/24 70.9 kg (156 lb 4.8 oz)   07/03/24 69.9 kg (154 lb)   07/01/24 69.3 kg (152 lb 11.2 oz)   06/28/24 71.2 kg (157 lb)     General: Pleasant female, NAD  Eyes: EOMI, PERRL  Respiratory: Normal effort, no adventitious breath sounds  Ext: no peripheral edema  Neurologic: Grossly nonfocal. A/O x 4.  Skin: No rashes, petechiae, or bruising noted on exposed skin.   Laboratory Data:  Most Recent 3 CBC's:  Recent Labs   Lab Test 08/15/24  0934 08/07/24  0026 08/04/24  0404 07/19/24  0647 07/17/24  1012   WBC 11.1* 12.4* 12.1*   < > 11.5*   HGB 6.8* 6.8* 6.7*   < > 6.7*   MCV 86 86 88   < > 80    392 402   < > 400   ANEUTAUTO 7.5  --  7.9  --  7.7    < > = values in this interval not displayed.    Most Recent 3 BMP's:  Recent Labs   Lab Test 08/15/24  0934 08/07/24  0026 08/04/24  0404 07/19/24  1204  07/17/24  1012 07/12/24  1041    138 136 138   < > 137   POTASSIUM 4.0 3.7 3.6 3.9   < > 4.2   CHLORIDE 107 105 102 104   < > 105   CO2 21* 23 23 23   < > 22   BUN 14.5 7.1 9.5 4.9*   < > 9.3   CR 0.58 0.54 0.57 0.51   < > 0.52   ANIONGAP 10 10 11 11   < > 10   MICAH 8.7* 8.3* 8.6* 8.7*   < > 9.3   GLC 85 95 102* 81   < > 89   PROTTOTAL  --  7.0  --  7.5  --  7.6   ALBUMIN  --  4.2  --  4.6  --  4.6    < > = values in this interval not displayed.    Most Recent 2 LFT's:  Recent Labs   Lab Test 08/07/24  0026 07/19/24  1203   AST 77* 62*   ALT 58* 27   ALKPHOS 59 64   BILITOTAL 3.3* 3.2*    Most Recent TSH and T4:  Recent Labs   Lab Test 04/23/24  1754   TSH 0.60     Phos/Mag:  Lab Results   Component Value Date    PHOS 4.8 (H) 05/13/2023    PHOS 5.0 (H) 05/12/2023    PHOS 3.6 02/21/2021    MAG 2.0 05/16/2024    MAG 1.7 04/29/2024    MAG 1.9 04/28/2024      I reviewed the above labs today.    Assessment and Plan:  1. Sickle Cell HgbSS Disease  2. Acute pain crises  3. Chronic Pain  4. 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 History of CVA  7. Hearing loss  8. Nausea/vomiting       Jennifer is seen today for routine follow-up and monthly crizanlizumab infusion. One of her primary concerns recently is ongoing abdominal pain. She is not having any new bowel concerns such as diarrhea or constipation. Her abdominal pain often precedes meals and overall has limited her intake. She recently started metoclopramide 5 mg three times a day as needed following an ED visit which has been the most helpful so far in controlling her symptoms. We discussed the mechanism of metoclopramide today. Her response to the medication could be indicative of a functional GI issue such as functional dyspepsia or gastroparesis. She is scheduled for an EGD in November which I would like to proceed with for further evaluation. Imaging studies have so far been negative. She has concerns that  she will not have a ride to her endoscopy appointment in November since her family canceled on taking her at the last minute which caused her to miss her last procedure. I encouraged her to discuss this with her mother and siblings now so she has a plan in place by the time of her appointment in a few months.    She is actively working on reducing her ED visits. Today she requests to decrease her total oxycodone quantity per week refill from 15 to 10 tablets starting with her next fill on Monday 8/19. Her goal is to reduce this further decrease to 5 tablets per refill in October. The prescription is intended to be used as needed therefore it is not imperative she receives refills once a week. Nonetheless, she is making progress in her goal to reduce her opioid use which I congratulated her on today.       She has remained on Jadenu but had been taking a break from additional chelation for a few months.  Repeat Ferriscan was performed 5/3/24 showing and increase in LIC now at 31.8 compared to 28.5 in September 2023. Deferiprone has now been added to her chelation regimen. She will be due for a repeat Ferriscan around November 2024.      -------------------------------------  Plan:  -Continue Hydrea to 3000mg daily to help lessen frequency of sickle cell pain.   -Continue monthly crizanlizumab infusions.  -Continue slow taper of oxycodone. Currently receiving oxycodone 10 mg every 4 hours PRN, qty 15. Plan to tapering to qty 10 on Monday 8/19.  .  -She can self-reduce infusion days/week and we will continue to keep the cap at 2/week for now.   -Continue infusion center visits limited to two times per week (Mondays and Fridays ideally although still needs to call to request). Continue diligent home management with current medications, heat, rest, compression, warm baths.   -If unable to manage at home can go to ED  with continued plan to use IV Dilaudid and take a break from ketamine (10/2/23). No PCA use and goal for  any admission would still be to discharge by 5 days or less  -EGD for evaluation of ongoing n/v and abdominal pain, scheduled in November   -Continue metoclopramide 5 mg TID PRN  - Continue Jadenu and deferiprone for iron overload  -Continue aspirin BID  -She is due for an updated pneumococcal vaccine which we did not discuss today  -RTC in 2 months, coordinate with sidney Mantilla CNP  ---  59 minutes spent on the date of the encounter doing chart review, review of test results, interpretation of tests, patient visit, and documentation.    The longitudinal plan of care for the diagnosis(es)/condition(s) as documented were addressed during this visit. Due to the added complexity in care, I will continue to support Jennifer in the subsequent management and with ongoing continuity of care.

## 2024-08-15 NOTE — Clinical Note
8/15/2024      Jennifer Cervantes  [address]  [facility] [address]      Dear Colleague,    Thank you for referring your patient, Jennifer Cervantes, to the [facility]. Please see a copy of my visit note below.    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 She had multiple thromboembolic events in 2021 despite adherent anticoagulation use (though warfarin was perpetually low) and there are concerns for chronic thromboembolic disease but did not have pulmonary HTN on a November 2021 cath. She is maintained on chronic PO opioids and twice-weekly infusion visits (since 1/24/22) but has been able to be maintained on this regimen and has stayed out of the ED most of the time with even rarer admissions for most of 2023.     Interval History:  Abdominal pain  Metoclopramide 5 mg TID PRN at discharge-taking twice a day  Stopped omeprazole when starting metocolopramide        Sickle Cell Disease Comprehensive Checklist  Bone Health/Avascular Necrosis Screening/Symptoms (each visit): no new concerns today  Leg Ulcer evaluation (every visit): no  Hypertension (every visit):stable 11/3/23  Last pulmonary evaluation (asthma, AMAN, pulm HTN): 9/28/22, due for follow-up  Stroke/silent cerebral infarct Hx (Y/N): Yes TIA ~2014, first event ~age 1 with full stroke and R sided weakness  Last PCP Visit: 3/6/23  Vaccines:  PCV13: 5/13/19  Pneumovax (PPSV23): 3/04, 10/09, 7/12/19 (next due 7/2024)  Menactra: 4/2010, 9/2015 (MCV done 8/16/21)  MenB: 9/16/15,  5/13/19  Influenza: 10/2/23  Audiology (chelation): done 2/27/24  Comparison to Previous Audiogram dated 6/6/2022:     Pure Tone Thresholds 250-8000 Hz:    RIGHT: stable    LEFT: stable     High Frequency Audiometry 9,000-16,000Hz:     RIGHT: stable    LEFT: stable     Word Recognition Score:    RIGHT: stable    LEFT: stable    Plan last reviewed with patient: 10/2/23    Patient background: 22 yo F, enjoys movies and kids though there are times where she does not really want to talk to people. Does not have a lot of social support at home.     Sickle Cell Disease History  Primary Hematologist Team: Jose Rafael Duncan  PCP: none  Genotype: SS  Acute Pain Crisis Treatment: (limiting IV)   ER   Dilaudid 1 mg IV q1h up to 3 doses  Toradol 30 mg IV x1   Maintenance IV fluids with LR  Other: Zofran 8 mg IV PRN nausea  Inpatient:  PCA Dilaudid 1 hr q30 minutes, no basal rate  Toradol 30 mg x6h x 4 hr  LR maintenance x 1-2 days  Other Medications: Zofran  ASA  Supportive Care: Docusate, Senna  Chronic Pain Medications:    Home regimen: oxycodone 15 mg p.o. q.4-6 hours p.r.n. breakthrough pain.  She also continues on Voltaren gel, and Zoloft among other medications.    -Also benefits from mental health visits, acupuncture  Baseline Hemoglobin: 7 g/dl without chronic transfusions  Hydroxyurea use: Yes  H/O blood transfusions: Yes, several (iron overload) Most recent 11/20/2021  H/O Transfusion Reactions: no  Antibodies:none  H/O Acute Chest Syndrome: Yes  Last episode:9/05/22 (previously 4/26/21, 10/2019)   ICU/intubation: not with 9/2022 admission  H/O Stroke: Yes (managed with chronic transfusions in the past, stopped late Spring 2020)  H/O VTE: Yes (2/2021)  H/O Cholecystectomy or Splenectomy: no  H/O Asthma, Pulm HTN, AVN, Leg Ulcers, Nephropathy, Retinopathy, etc: Iron overload, asthma, chronic lung disease, physical limitations from early stroke    ---------------------------------------  Jennifer I Billy's Goals (reviewed  today 5/24/24)    1-3 month goal:  Interview for a new job    6 month goal:  Find her own place after saving up money    12 month goal:      Disease-specific goal(s):  Decrease frequency of ED visits. Decrease opioid qty by 10 tablets with next refill (June)      Current Outpatient Medications   Medication Sig Dispense Refill    acetaminophen (TYLENOL) 325 MG tablet Take 3 tablets (975 mg) by mouth every 8 hours 270 tablet 0    albuterol (PROAIR HFA/PROVENTIL HFA/VENTOLIN HFA) 108 (90 Base) MCG/ACT inhaler Inhale 2 puffs into the lungs every 6 hours as needed for shortness of breath or wheezing 8.5 g 3    albuterol (PROVENTIL) (2.5 MG/3ML) 0.083% neb solution Take 2 vials (5 mg) by nebulization every 6 hours as needed for shortness of breath or wheezing 90 mL 3    aspirin (ASA) 81 MG chewable tablet Take 1 tablet (81 mg) by mouth 2 times daily 60 tablet 11    budesonide-formoterol (SYMBICORT) 160-4.5 MCG/ACT Inhaler Inhale 2 puffs twice daily plus 1-2 puffs as needed. May use up to 12 puffs per day. 20.4 g 11    deferasirox (JADENU) 360 MG tablet Take 4 tablets (1,440 mg) by mouth every evening 120 tablet 4    deferiprone (FERRIPROX) 1000 MG TABS tablet Take 1 tablet by mouth daily      EPINEPHrine (ANY BX GENERIC EQUIV) 0.3 MG/0.3ML injection 2-pack Inject 0.3 mLs (0.3 mg) into the muscle as needed for anaphylaxis 1 each 1    gabapentin (NEURONTIN) 300 MG capsule Take 1 capsule (300 mg) by mouth 3 times daily Take PO 1 pill in evening (300 mg) TID to start. If tolerated, increase by 300 mg in morning or lunch every few days, updating your doctor's office in time to get refills. The maximum dose is 900 mg TID. 90 capsule 1    hydroxyurea (HYDREA) 500 MG capsule TAKE 6 CAPSULES (3,000 MG) BY MOUTH ONCE DAILY. 180 capsule 3    ibuprofen (ADVIL/MOTRIN) 600 MG tablet Take 600 mg by mouth every 6 hours as needed for moderate pain Using rarely, 2x/week at most      ibuprofen (ADVIL/MOTRIN) 800 MG tablet Take 800 mg by  mouth every 8 hours as needed for moderate pain      melatonin 5 MG tablet Take 1 tablet (5 mg) by mouth nightly as needed for sleep 30 tablet 1    methocarbamol (ROBAXIN) 750 MG tablet Take 1 tablet (750 mg) by mouth 4 times daily as needed for muscle spasms (during sickle pain crises. Okay to take scheduled while in pain) 60 tablet 1    metoclopramide (REGLAN) 5 MG tablet Take 1 tablet (5 mg) by mouth 3 times daily as needed (Nausea) 45 tablet 0    naloxone (NARCAN) 4 MG/0.1ML nasal spray Spray 4 mg into one nostril alternating nostrils as needed for opioid reversal every 2-3 minutes until assistance arrives 0.2 mL 0    naloxone (NARCAN) 4 MG/0.1ML nasal spray Spray 4 mg into one nostril alternating nostrils as needed for opioid reversal every 2-3 minutes until assistance arrives      omeprazole (PRILOSEC) 20 MG DR capsule Take 1 capsule (20 mg) by mouth daily 30 capsule 0    ondansetron (ZOFRAN ODT) 8 MG ODT tab Take 1 tablet (8 mg) by mouth every 8 hours as needed for nausea 40 tablet 4    oxyCODONE IR (ROXICODONE) 10 MG tablet Take 1 tablet (10 mg) by mouth every 4 hours as needed for severe pain or breakthrough pain Goal 4 per day. Max 6 per day. 15 tablet 0       Past Medical History  Past Medical History:   Diagnosis Date    Anxiety     Bleeding disorder (H24)     Blood clotting disorder (H24)     Cerebral infarction (H) 2015    Cognitive developmental delay     low IQ. 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 side (H)     right hand contractures    Iron overload due to repeated red blood cell transfusions     Migraines     Multiple subsegmental pulmonary emboli without acute cor pulmonale (H) 02/01/2021    Oppositional defiant behavior     Presence of intrauterine contraceptive device 2/18/2020    Superficial venous thrombosis of arm, right 03/25/2021    Uncomplicated asthma       Past Surgical History:   Procedure Laterality Date    AS INSERT TUNNELED CV 2 CATH W/O PORT/PUMP      CHOLECYSTECTOMY      CV RIGHT HEART CATH MEASUREMENTS RECORDED N/A 11/18/2021    Procedure: Right Heart Cath;  Surgeon: Jackson Stauffer MD;  Location:  HEART CARDIAC CATH LAB    INSERT PORT VASCULAR ACCESS Left 4/21/2021    Procedure: INSERTION, VASCULAR ACCESS PORT (NOT SURE ON SIDE UNTIL REMOVAL);  Surgeon: Rajan More MD;  Location: [facility] OR    IR CHEST PORT PLACEMENT > 5 YRS OF AGE  4/21/2021    IR CVC NON TUNNEL LINE REMOVAL  5/6/2021    IR CVC NON TUNNEL PLACEMENT > 5 YRS  04/07/2020    IR CVC NON TUNNEL PLACEMENT > 5 YRS  4/30/2021    IR CVC NON TUNNEL PLACEMENT > 5 YRS  9/7/2022    IR PORT REMOVAL LEFT  4/21/2021    REMOVE PORT VASCULAR ACCESS Left 4/21/2021    Procedure: REMOVAL, VASCULAR ACCESS PORT LEFT;  Surgeon: Rajan More MD;  Location: [facility] OR    REPAIR TENDON ELBOW Right 10/02/2019    Procedure: Right Elbow Flexor Lengthening, Flexor Pronator Slide Of Wrist and Finger, Thumb Adductor Lengthening;  Surgeon: Anai Franco MD;  Location: [facility] OR    TONSILLECTOMY Bilateral 10/02/2019    Procedure: Bilateral Tonsillectomy;  Surgeon: Farhana Guy MD;  Location: [facility] OR    ZZC BREAST REDUCTION (INCLUDES LIPO) TIER 3 Bilateral 04/23/2019     Allergies   Allergen Reactions    Contrast Dye      Hives and breathing issues    Fish-Derived Products Hives    Seafood Hives    Adhesive Tape Hives     Primipore dressing causes hives    Gadolinium     Iodinated Contrast Media      Social History   Social History     Tobacco Use    Smoking status: Never     Passive exposure: Never    Smokeless tobacco: Never   Substance Use Topics    Alcohol use: Not Currently     Alcohol/week: 0.0 standard drinks of alcohol    Drug use: Never   Her mom lives next door to her.  Past medical history and social history were reviewed.    Physical Examination:  /78   Pulse 89   Temp 97.9  F (36.6   C) (Oral)   Resp 18   Wt 70.4 kg (155 lb 1.6 oz)   LMP  (LMP Unknown)   SpO2 96%   BMI 26.62 kg/m        Wt Readings from Last 10 Encounters:   08/15/24 70.4 kg (155 lb 1.6 oz)   08/06/24 71.7 kg (158 lb)   08/04/24 68 kg (150 lb)   07/19/24 69.4 kg (153 lb)   07/19/24 69.3 kg (152 lb 12.8 oz)   07/17/24 68.6 kg (151 lb 3.2 oz)   07/12/24 70.9 kg (156 lb 4.8 oz)   07/03/24 69.9 kg (154 lb)   07/01/24 69.3 kg (152 lb 11.2 oz)   06/28/24 71.2 kg (157 lb)     General: Pleasant female, NAD  Eyes: EOMI, PERRL  Respiratory: Normal effort, no adventitious breath sounds  Ext: no peripheral edema  Neurologic: Grossly nonfocal. A/O x 4.  Skin: No rashes, petechiae, or bruising noted on exposed skin.   Laboratory Data:  Most Recent 3 CBC's:  Recent Labs   Lab Test 08/07/24  0026 08/04/24  0404 07/19/24  0647 07/17/24  1012 07/12/24  1041   WBC 12.4* 12.1* 11.8* 11.5* 10.8   HGB 6.8* 6.7* 7.2* 6.7* 6.9*   MCV 86 88 85 80 86    402 431 400 447   ANEUTAUTO  --  7.9  --  7.7 7.3    Most Recent 3 BMP's:  Recent Labs   Lab Test 08/07/24  0026 08/04/24  0404 07/19/24  1203 07/17/24  1012 07/12/24  1041    136 138   < > 137   POTASSIUM 3.7 3.6 3.9   < > 4.2   CHLORIDE 105 102 104   < > 105   CO2 23 23 23   < > 22   BUN 7.1 9.5 4.9*   < > 9.3   CR 0.54 0.57 0.51   < > 0.52   ANIONGAP 10 11 11   < > 10   MICAH 8.3* 8.6* 8.7*   < > 9.3   GLC 95 102* 81   < > 89   PROTTOTAL 7.0  --  7.5  --  7.6   ALBUMIN 4.2  --  4.6  --  4.6    < > = values in this interval not displayed.    Most Recent 2 LFT's:  Recent Labs   Lab Test 08/07/24  0026 07/19/24  1203   AST 77* 62*   ALT 58* 27   ALKPHOS 59 64   BILITOTAL 3.3* 3.2*    Most Recent TSH and T4:  Recent Labs   Lab Test 04/23/24  1754   TSH 0.60     Phos/Mag:  Lab Results   Component Value Date    PHOS 4.8 (H) 05/13/2023    PHOS 5.0 (H) 05/12/2023    PHOS 3.6 02/21/2021    MAG 2.0 05/16/2024    MAG 1.7 04/29/2024    MAG 1.9 04/28/2024      I reviewed the above labs  today.    Assessment and Plan:  1. Sickle Cell HgbSS Disease  2. Acute pain crises  3. Chronic Pain  4. 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 History of CVA  7. Hearing loss  8. Nausea/vomiting       Jennifer is seen today for routine follow-up and recent hospital follow-up after admission 5/16 through 5/20 for sickle cell pain crisis.  She acknowledges that she did need to go to the ED once this week and was seen in the infusion center 2 times for pain control.  She feels that her recent pain exacerbation is related to emotional stress regarding relationships with her mom and siblings.  She feels safe in her current housing situation but has a goal to save up enough money to move out within the next few months.  She recently quit her job and is applied for a new job at the [employer] which she is very excited about.    Today we discussed pain control efforts and acknowledged that although her current stressors are contributing to physical pain they will not necessarily successfully be treated with additional opioids.  She is motivated to continue to limit her infusion center visits and even more so limit her ED visits if possible.  We decided not to adjust her oxycodone prescription today but she feels strongly that she would like to decrease her total tablet count by 5 to 10 tablets when I see her in about 3 weeks.    She remains on Jadenu but has been taking a break from additional chelation for a few months now.  Repeat Ferriscan was performed 5/3 showing and increase in LIC now at 31.8 compared to 28.5 in September 2023. We will likely add deferiprone to her chelation regimen but I will first confirm this with Dr. Duncan.     She missed her last EGD but continues to have persistent nausea and appetite changes as well as intermittent epigastric pain. I would still like her to pursue this to further evaluate for possible gastritis or ulcers. Currently  this is rescheduled in November but we can inquire about an earlier appointment.     -------------------------------------  Plan:  -Continue Hydrea to 3000mg daily to help lessen frequency of sickle cell pain.   -Continue monthly crizanlizumab infusions.  -Continue slow taper of oxycodone. Currently receiving oxycodone 10 mg every 4 hours PRN, qty 20. Plan to tapering to qty 15 in about 3 weeks. I'll see her prior to this.  -She can self-reduce infusion days/week and we will continue to keep the cap at 2/week for now. This was recently revisited with her care team.   -Continue infusion center visits limited to two times per week (Mondays and Fridays ideally although still needs to call to request). Continue diligent home management with current medications, heat, rest, compression, warm baths. Methocarbamol was recently added which Jennifer is utilizing and finds helpful.  -If unable to manage at home can go to ED  with continued plan to use IV Dilaudid and take a break from ketamine (10/2/23). No PCA use and goal for any admission would still be to discharge by 5 days or less  -EGD for evaluation of ongoing n/v  - Continue Jadenu. Will consider deferiprone based on 5/3 Ferriscan results.  -Continue aspirin BID  -RTC in 3 weeks, coordinate with sidney renny Mantilla CNP  ---  *** minutes spent on the date of the encounter doing {2021 E&M time in:[id number]}.    The longitudinal plan of care for the diagnosis(es)/condition(s) as documented were addressed during this visit. Due to the added complexity in care, I will continue to support Jennifer in the subsequent management and with ongoing continuity of care.                  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 She had multiple thromboembolic events in 2021 despite adherent anticoagulation use (though warfarin was perpetually low) and there are concerns for chronic thromboembolic disease but did not have pulmonary HTN on a November 2021 cath. She is maintained on chronic PO opioids and twice-weekly infusion visits (since 1/24/22) but has been able to be maintained on this regimen and has stayed out of the ED most of the time with even rarer admissions for most of 2023.     Interval History:  Jennifer is seen today prior to routine sidney appointment. She has been trying to decrease her ED utilization and has been frustrated because the majority of her ED visits recently have been to address abdominal pain rather than sickle cell pain. She went over 2 weeks without an ED visit which she was proud of (7/19-8/4). Following her most recent ED discharge, she was prescribed metoclopramide which she's been taking twice a day on average. This has been very helpful in minimizing but not resolving her abdominal symptoms. When she started the metoclopramide, she stopped the omeprazole as she wanted to see how she'd feel with only one medication addressing her GI symptoms.    She continues to feel motivated to decreased her opioid use. Today she requests to go from 15 to 10 tablets of oxycodone per weekly refill starting next week. She acknowledges that she receives additional opioid through her IV dilaudid doses in clinic.     She feels that things are stable although trying at times with her family relationships at home. Her sister recently had a baby girl a few weeks early who will be coming home from the NICU tomorrow.       Sickle Cell Disease Comprehensive Checklist  Bone Health/Avascular Necrosis Screening/Symptoms (each  visit): no new concerns today  Leg Ulcer evaluation (every visit): no  Hypertension (every visit):stable 11/3/23  Last pulmonary evaluation (asthma, AMAN, pulm HTN): 9/28/22, due for follow-up  Stroke/silent cerebral infarct Hx (Y/N): Yes TIA ~2014, first event ~age 1 with full stroke and R sided weakness  Last PCP Visit: 7/3/24 with Dr. Case  Vaccines:  PCV13: 5/13/19  Pneumovax (PPSV23): 3/04, 10/09, 7/12/19, due-did not discuss today  Menactra: 4/2010, 9/2015 (MCV done 8/16/21)  MenB: 9/16/15, 5/13/19  Influenza: 10/2/23  Audiology (chelation): done 2/27/24    Comparison to Previous Audiogram dated 6/6/2022:     Pure Tone Thresholds 250-8000 Hz:    RIGHT: stable    LEFT: stable     High Frequency Audiometry 9,000-16,000Hz:     RIGHT: stable    LEFT: stable     Word Recognition Score:    RIGHT: stable    LEFT: stable    Plan last reviewed with patient: 10/2/23    Patient background: 22 yo F, enjoys movies and kids though there are times where she does not really want to talk to people. Does not have a lot of social support at home.     Sickle Cell Disease History  Primary Hematologist Team: Jose Rafael Duncan  PCP: none  Genotype: SS  Acute Pain Crisis Treatment: (limiting IV)   ER   Dilaudid 1 mg IV q1h up to 3 doses  Toradol 30 mg IV x1   Maintenance IV fluids with LR  Other: Zofran 8 mg IV PRN nausea  Inpatient:  PCA Dilaudid 1 hr q30 minutes, no basal rate  Toradol 30 mg x6h x 4 hr  LR maintenance x 1-2 days  Other Medications: Zofran  ASA  Supportive Care: Docusate, Senna  Chronic Pain Medications:    Home regimen: oxycodone 15 mg p.o. q.4-6 hours p.r.n. breakthrough pain.  She also continues on Voltaren gel, and Zoloft among other medications.    -Also benefits from mental health visits, acupuncture  Baseline Hemoglobin: 7 g/dl without chronic transfusions  Hydroxyurea use: Yes  H/O blood transfusions: Yes, several (iron overload) Most recent 11/20/2021  H/O Transfusion Reactions: no  Antibodies:none  H/O  Acute Chest Syndrome: Yes  Last episode:9/05/22 (previously 4/26/21, 10/2019)   ICU/intubation: not with 9/2022 admission  H/O Stroke: Yes (managed with chronic transfusions in the past, stopped late Spring 2020)  H/O VTE: Yes (2/2021)  H/O Cholecystectomy or Splenectomy: no  H/O Asthma, Pulm HTN, AVN, Leg Ulcers, Nephropathy, Retinopathy, etc: Iron overload, asthma, chronic lung disease, physical limitations from early stroke    ---------------------------------------  Jennifer Cervantes's Goals (reviewed today 5/24/24)    1-3 month goal:  Continue to look for a job    6 month goal:      12 month goal:      Disease-specific goal(s):  Decrease frequency of ED visits. Decrease opioid qty per weekly refill. She'd like to get down to 5 tablets per week by October      Current Outpatient Medications   Medication Sig Dispense Refill     oxyCODONE IR (ROXICODONE) 10 MG tablet Take 1 tablet (10 mg) by mouth every 4 hours as needed for severe pain or breakthrough pain Goal 4 per day. Max 6 per day.       acetaminophen (TYLENOL) 325 MG tablet Take 3 tablets (975 mg) by mouth every 8 hours 270 tablet 0     albuterol (PROAIR HFA/PROVENTIL HFA/VENTOLIN HFA) 108 (90 Base) MCG/ACT inhaler Inhale 2 puffs into the lungs every 6 hours as needed for shortness of breath or wheezing 8.5 g 3     albuterol (PROVENTIL) (2.5 MG/3ML) 0.083% neb solution Take 2 vials (5 mg) by nebulization every 6 hours as needed for shortness of breath or wheezing 90 mL 3     aspirin (ASA) 81 MG chewable tablet Take 1 tablet (81 mg) by mouth 2 times daily 60 tablet 11     budesonide-formoterol (SYMBICORT) 160-4.5 MCG/ACT Inhaler Inhale 2 puffs twice daily plus 1-2 puffs as needed. May use up to 12 puffs per day. 20.4 g 11     deferasirox (JADENU) 360 MG tablet Take 4 tablets (1,440 mg) by mouth every evening 120 tablet 4     deferiprone (FERRIPROX) 1000 MG TABS tablet Take 1 tablet by mouth daily       EPINEPHrine (ANY BX GENERIC EQUIV) 0.3 MG/0.3ML injection  2-pack Inject 0.3 mLs (0.3 mg) into the muscle as needed for anaphylaxis 1 each 1     gabapentin (NEURONTIN) 300 MG capsule Take 1 capsule (300 mg) by mouth 3 times daily Take PO 1 pill in evening (300 mg) TID to start. If tolerated, increase by 300 mg in morning or lunch every few days, updating your doctor's office in time to get refills. The maximum dose is 900 mg TID. 90 capsule 1     hydroxyurea (HYDREA) 500 MG capsule TAKE 6 CAPSULES (3,000 MG) BY MOUTH ONCE DAILY. 180 capsule 3     ibuprofen (ADVIL/MOTRIN) 600 MG tablet Take 600 mg by mouth every 6 hours as needed for moderate pain Using rarely, 2x/week at most       ibuprofen (ADVIL/MOTRIN) 800 MG tablet Take 800 mg by mouth every 8 hours as needed for moderate pain       melatonin 5 MG tablet Take 1 tablet (5 mg) by mouth nightly as needed for sleep 30 tablet 1     methocarbamol (ROBAXIN) 750 MG tablet Take 1 tablet (750 mg) by mouth 4 times daily as needed for muscle spasms (during sickle pain crises. Okay to take scheduled while in pain) 60 tablet 1     metoclopramide (REGLAN) 5 MG tablet Take 1 tablet (5 mg) by mouth 3 times daily as needed (Nausea) 45 tablet 0     naloxone (NARCAN) 4 MG/0.1ML nasal spray Spray 4 mg into one nostril alternating nostrils as needed for opioid reversal every 2-3 minutes until assistance arrives 0.2 mL 0     naloxone (NARCAN) 4 MG/0.1ML nasal spray Spray 4 mg into one nostril alternating nostrils as needed for opioid reversal every 2-3 minutes until assistance arrives       omeprazole (PRILOSEC) 20 MG DR capsule Take 1 capsule (20 mg) by mouth daily 30 capsule 0     ondansetron (ZOFRAN ODT) 8 MG ODT tab Take 1 tablet (8 mg) by mouth every 8 hours as needed for nausea 40 tablet 4     Past Medical History  Past Medical History:   Diagnosis Date     Anxiety      Bleeding disorder (H24)      Blood clotting disorder (H24)      Cerebral infarction (H) 2015     Cognitive developmental delay     low IQ. Please recognize when  managing pain and planning with her     Depressive disorder      Hemiplegia and hemiparesis following cerebral infarction affecting right dominant side (H)     right hand contractures     Iron overload due to repeated red blood cell transfusions      Migraines      Multiple subsegmental pulmonary emboli without acute cor pulmonale (H) 02/01/2021     Oppositional defiant behavior      Presence of intrauterine contraceptive device 2/18/2020     Superficial venous thrombosis of arm, right 03/25/2021     Uncomplicated asthma      Past Surgical History:   Procedure Laterality Date     AS INSERT TUNNELED CV 2 CATH W/O PORT/PUMP       CHOLECYSTECTOMY       CV RIGHT HEART CATH MEASUREMENTS RECORDED N/A 11/18/2021    Procedure: Right Heart Cath;  Surgeon: Jackson Stauffer MD;  Location:  [facility]ATH [facility]     INSERT PORT VASCULAR ACCESS Left 4/21/2021    Procedure: INSERTION, VASCULAR ACCESS PORT (NOT SURE ON SIDE UNTIL REMOVAL);  Surgeon: Rajan More MD;  Location: [facility] OR     IR CHEST PORT PLACEMENT > 5 YRS OF AGE  4/21/2021     IR CVC NON TUNNEL LINE REMOVAL  5/6/2021     IR CVC NON TUNNEL PLACEMENT > 5 YRS  04/07/2020     IR CVC NON TUNNEL PLACEMENT > 5 YRS  4/30/2021     IR CVC NON TUNNEL PLACEMENT > 5 YRS  9/7/2022     IR PORT REMOVAL LEFT  4/21/2021     REMOVE PORT VASCULAR ACCESS Left 4/21/2021    Procedure: REMOVAL, VASCULAR ACCESS PORT LEFT;  Surgeon: Rajan More MD;  Location: [facility] OR     REPAIR TENDON ELBOW Right 10/02/2019    Procedure: Right Elbow Flexor Lengthening, Flexor Pronator Slide Of Wrist and Finger, Thumb Adductor Lengthening;  Surgeon: Anai Franco MD;  Location: [facility]     TONSILLECTOMY Bilateral 10/02/2019    Procedure: Bilateral Tonsillectomy;  Surgeon: Farhana Guy MD;  Location: [facility]     ZZC BREAST REDUCTION (INCLUDES LIPO) TIER 3 Bilateral 04/23/2019     Allergies   Allergen Reactions     Contrast Dye      Hives and breathing issues     Fish-Derived  Products Hives     Seafood Hives     Adhesive Tape Hives     Primipore dressing causes hives     Gadolinium      Iodinated Contrast Media      Social History   Social History     Tobacco Use     Smoking status: Never     Passive exposure: Never     Smokeless tobacco: Never   Substance Use Topics     Alcohol use: Not Currently     Alcohol/week: 0.0 standard drinks of alcohol     Drug use: Never   Her mom lives next door to her.  Past medical history and social history were reviewed.    Physical Examination:  /78   Pulse 89   Temp 97.9  F (36.6  C) (Oral)   Resp 18   Wt 70.4 kg (155 lb 1.6 oz)   LMP  (LMP Unknown)   SpO2 96%   BMI 26.62 kg/m        Wt Readings from Last 10 Encounters:   08/15/24 70.4 kg (155 lb 1.6 oz)   08/06/24 71.7 kg (158 lb)   08/04/24 68 kg (150 lb)   07/19/24 69.4 kg (153 lb)   07/19/24 69.3 kg (152 lb 12.8 oz)   07/17/24 68.6 kg (151 lb 3.2 oz)   07/12/24 70.9 kg (156 lb 4.8 oz)   07/03/24 69.9 kg (154 lb)   07/01/24 69.3 kg (152 lb 11.2 oz)   06/28/24 71.2 kg (157 lb)     General: Pleasant female, NAD  Eyes: EOMI, PERRL  Respiratory: Normal effort, no adventitious breath sounds  Ext: no peripheral edema  Neurologic: Grossly nonfocal. A/O x 4.  Skin: No rashes, petechiae, or bruising noted on exposed skin.   Laboratory Data:  Most Recent 3 CBC's:  Recent Labs   Lab Test 08/15/24  0934 08/07/24  0026 08/04/24  0404 07/19/24  0647 07/17/24  1012   WBC 11.1* 12.4* 12.1*   < > 11.5*   HGB 6.8* 6.8* 6.7*   < > 6.7*   MCV 86 86 88   < > 80    392 402   < > 400   ANEUTAUTO 7.5  --  7.9  --  7.7    < > = values in this interval not displayed.    Most Recent 3 BMP's:  Recent Labs   Lab Test 08/15/24  0934 08/07/24  0026 08/04/24  0404 07/19/24  1203 07/17/24  1012 07/12/24  1041    138 136 138   < > 137   POTASSIUM 4.0 3.7 3.6 3.9   < > 4.2   CHLORIDE 107 105 102 104   < > 105   CO2 21* 23 23 23   < > 22   BUN 14.5 7.1 9.5 4.9*   < > 9.3   CR 0.58 0.54 0.57 0.51   < > 0.52    ANIONGAP 10 10 11 11   < > 10   MICAH 8.7* 8.3* 8.6* 8.7*   < > 9.3   GLC 85 95 102* 81   < > 89   PROTTOTAL  --  7.0  --  7.5  --  7.6   ALBUMIN  --  4.2  --  4.6  --  4.6    < > = values in this interval not displayed.    Most Recent 2 LFT's:  Recent Labs   Lab Test 08/07/24  0026 07/19/24  1203   AST 77* 62*   ALT 58* 27   ALKPHOS 59 64   BILITOTAL 3.3* 3.2*    Most Recent TSH and T4:  Recent Labs   Lab Test 04/23/24  1754   TSH 0.60     Phos/Mag:  Lab Results   Component Value Date    PHOS 4.8 (H) 05/13/2023    PHOS 5.0 (H) 05/12/2023    PHOS 3.6 02/21/2021    MAG 2.0 05/16/2024    MAG 1.7 04/29/2024    MAG 1.9 04/28/2024      I reviewed the above labs today.    Assessment and Plan:  1. Sickle Cell HgbSS Disease  2. Acute pain crises  3. Chronic Pain  4. 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 History of CVA  7. Hearing loss  8. Nausea/vomiting       Jennifer is seen today for routine follow-up and monthly crizanlizumab infusion. One of her primary concerns recently is ongoing abdominal pain. She is not having any new bowel concerns such as diarrhea or constipation. Her abdominal pain often precedes meals and overall has limited her intake. She recently started metoclopramide 5 mg three times a day as needed following an ED visit which has been the most helpful so far in controlling her symptoms. We discussed the mechanism of metoclopramide today. Her response to the medication could be indicative of a functional GI issue such as functional dyspepsia or gastroparesis. She is scheduled for an EGD in November which I would like to proceed with for further evaluation. Imaging studies have so far been negative. She has concerns that she will not have a ride to her endoscopy appointment in November since her family canceled on taking her at the last minute which caused her to miss her last procedure. I encouraged her to discuss this with her mother and siblings now  so she has a plan in place by the time of her appointment in a few months.    She is actively working on reducing her ED visits. Today she requests to decrease her total oxycodone quantity per week refill from 15 to 10 tablets starting with her next fill on Monday 8/19. Her goal is to reduce this further decrease to 5 tablets per refill in October. The prescription is intended to be used as needed therefore it is not imperative she receives refills once a week. Nonetheless, she is making progress in her goal to reduce her opioid use which I congratulated her on today.        and recent hospital follow-up after admission 5/16 through 5/20 for sickle cell pain crisis.  She acknowledges that she did need to go to the ED once this week and was seen in the infusion center 2 times for pain control.  She feels that her recent pain exacerbation is related to emotional stress regarding relationships with her mom and siblings.  She feels safe in her current housing situation but has a goal to save up enough money to move out within the next few months.  She recently quit her job and is applied for a new job at the [employer] which she is very excited about.    Today we discussed pain control efforts and acknowledged that although her current stressors are contributing to physical pain they will not necessarily successfully be treated with additional opioids.  She is motivated to continue to limit her infusion center visits and even more so limit her ED visits if possible.  We decided not to adjust her oxycodone prescription today but she feels strongly that she would like to decrease her total tablet count by 5 to 10 tablets when I see her in about 3 weeks.    She remains on Jadenu but has been taking a break from additional chelation for a few months now.  Repeat Ferriscan was performed 5/3 showing and increase in LIC now at 31.8 compared to 28.5 in September 2023. We will likely add deferiprone to her chelation  regimen but I will first confirm this with Dr. Duncan.     She missed her last EGD but continues to have persistent nausea and appetite changes as well as intermittent epigastric pain. I would still like her to pursue this to further evaluate for possible gastritis or ulcers. Currently this is rescheduled in November but we can inquire about an earlier appointment.     -------------------------------------  Plan:  -Continue Hydrea to 3000mg daily to help lessen frequency of sickle cell pain.   -Continue monthly crizanlizumab infusions.  -Continue slow taper of oxycodone. Currently receiving oxycodone 10 mg every 4 hours PRN, qty 20. Plan to tapering to qty 15 in about 3 weeks. I'll see her prior to this.  -She can self-reduce infusion days/week and we will continue to keep the cap at 2/week for now. This was recently revisited with her care team.   -Continue infusion center visits limited to two times per week (Mondays and Fridays ideally although still needs to call to request). Continue diligent home management with current medications, heat, rest, compression, warm baths. Methocarbamol was recently added which Jennifer is utilizing and finds helpful.  -If unable to manage at home can go to ED  with continued plan to use IV Dilaudid and take a break from ketamine (10/2/23). No PCA use and goal for any admission would still be to discharge by 5 days or less  -EGD for evaluation of ongoing n/v  - Continue Jadenu. Will consider deferiprone based on 5/3 Ferriscan results.  -Continue aspirin BID  -RTC in 3 weeks, coordinate with sidney renny Mantilla CNP  ---  *** minutes spent on the date of the encounter doing {2021 E&M time in:[id number]}.    The longitudinal plan of care for the diagnosis(es)/condition(s) as documented were addressed during this visit. Due to the added complexity in care, I will continue to support Jennifer in the subsequent management and with ongoing continuity of  care.                    Again, thank you for allowing me to participate in the care of your patient.        Sincerely,        Patricia Mantilla CNP

## 2024-08-15 NOTE — TELEPHONE ENCOUNTER
"Oncology Nurse Triage - Sickle Cell Pain Crisis:  Situation: Jennifer  calling about Sickle Cell Pain Crisis, requesting to be added on for IV fluids and pain medicine    Background:   Patient's last infusion was 8/1224  Last clinic visit date:6/19/24; Sees Patricia Mantilla today  Does patient have active treatment plan?  Yes    Assessment of Symptoms:  Onset/Duration of symptoms: 2 day    Is it typical sickle cell pain? Yes   Location: \"every where\"  Character: Sharp           Intensity: 8/10    Any radiation of pain, numbness, tingling, weakness, warmth, swelling, discoloration of arms or legs?No     Fever? No    Chest Pain Present: No     Shortness of breath: No     Other home therapies tried: HEAT/HEATING PAD     Last home medication taken and when: IBU at 0600 today; Oxycodone yesterday.    Any Refills Needed?: No     Does patient have transportation & length of time to get to clinic: No     Recommendations:   [phone] secure chat to Patricia Mantilla  Per Ember, charge nurse in [facility], if [facility] says its okay to add on IVF/pain meds to [facility] infusion, they can accommodate.   Per Patricia Mantilla, okay to add on IVF/pain meds. Infusion updated via chat.            "

## 2024-08-16 ENCOUNTER — HOSPITAL ENCOUNTER (INPATIENT)
Facility: CLINIC | Age: 24
LOS: 5 days | Discharge: HOME | DRG: 811 | End: 2024-08-21
Attending: EMERGENCY MEDICINE | Admitting: INTERNAL MEDICINE
Payer: COMMERCIAL

## 2024-08-16 ENCOUNTER — APPOINTMENT (OUTPATIENT)
Dept: GENERAL RADIOLOGY | Facility: CLINIC | Age: 24
DRG: 811 | End: 2024-08-16
Attending: PHYSICIAN ASSISTANT
Payer: COMMERCIAL

## 2024-08-16 DIAGNOSIS — M79.604 BILATERAL LEG PAIN: ICD-10-CM

## 2024-08-16 DIAGNOSIS — D57.00 SICKLE CELL DISEASE WITH CRISIS (H): ICD-10-CM

## 2024-08-16 DIAGNOSIS — R11.0 NAUSEA: ICD-10-CM

## 2024-08-16 DIAGNOSIS — M79.601 BILATERAL ARM PAIN: Primary | ICD-10-CM

## 2024-08-16 DIAGNOSIS — D57.00 SICKLE CELL PAIN CRISIS (H): ICD-10-CM

## 2024-08-16 DIAGNOSIS — R09.02 HYPOXIA: ICD-10-CM

## 2024-08-16 DIAGNOSIS — J96.01 ACUTE RESPIRATORY FAILURE WITH HYPOXIA (H): ICD-10-CM

## 2024-08-16 DIAGNOSIS — M79.602 BILATERAL ARM PAIN: Primary | ICD-10-CM

## 2024-08-16 DIAGNOSIS — M79.605 BILATERAL LEG PAIN: ICD-10-CM

## 2024-08-16 LAB
ALBUMIN SERPL BCG-MCNC: 4.5 G/DL (ref 3.5–5.2)
ALP SERPL-CCNC: 58 U/L (ref 40–150)
ALT SERPL W P-5'-P-CCNC: 41 U/L (ref 0–50)
ANION GAP SERPL CALCULATED.3IONS-SCNC: 11 MMOL/L (ref 7–15)
AST SERPL W P-5'-P-CCNC: 77 U/L (ref 0–45)
BASOPHILS # BLD AUTO: ABNORMAL 10*3/UL
BASOPHILS # BLD MANUAL: 0.4 10E3/UL (ref 0–0.2)
BASOPHILS NFR BLD AUTO: ABNORMAL %
BASOPHILS NFR BLD MANUAL: 3 %
BILIRUB SERPL-MCNC: 3.7 MG/DL
BUN SERPL-MCNC: 5.9 MG/DL (ref 6–20)
CALCIUM SERPL-MCNC: 8.9 MG/DL (ref 8.8–10.4)
CHLORIDE SERPL-SCNC: 104 MMOL/L (ref 98–107)
CREAT SERPL-MCNC: 0.54 MG/DL (ref 0.51–0.95)
EGFRCR SERPLBLD CKD-EPI 2021: >90 ML/MIN/1.73M2
EOSINOPHIL # BLD AUTO: ABNORMAL 10*3/UL
EOSINOPHIL # BLD MANUAL: 0.7 10E3/UL (ref 0–0.7)
EOSINOPHIL NFR BLD AUTO: ABNORMAL %
EOSINOPHIL NFR BLD MANUAL: 6 %
ERYTHROCYTE [DISTWIDTH] IN BLOOD BY AUTOMATED COUNT: 27.5 % (ref 10–15)
GLUCOSE SERPL-MCNC: 98 MG/DL (ref 70–99)
HCG SERPL QL: NEGATIVE
HCO3 SERPL-SCNC: 23 MMOL/L (ref 22–29)
HCT VFR BLD AUTO: 17.9 % (ref 35–47)
HGB BLD-MCNC: 6.6 G/DL (ref 11.7–15.7)
IMM GRANULOCYTES # BLD: ABNORMAL 10*3/UL
IMM GRANULOCYTES NFR BLD: ABNORMAL %
LYMPHOCYTES # BLD AUTO: ABNORMAL 10*3/UL
LYMPHOCYTES # BLD MANUAL: 2.6 10E3/UL (ref 0.8–5.3)
LYMPHOCYTES NFR BLD AUTO: ABNORMAL %
LYMPHOCYTES NFR BLD MANUAL: 22 %
MCH RBC QN AUTO: 31.4 PG (ref 26.5–33)
MCHC RBC AUTO-ENTMCNC: 36.9 G/DL (ref 31.5–36.5)
MCV RBC AUTO: 85 FL (ref 78–100)
METAMYELOCYTES # BLD MANUAL: 0.1 10E3/UL
METAMYELOCYTES NFR BLD MANUAL: 1 %
MONOCYTES # BLD AUTO: ABNORMAL 10*3/UL
MONOCYTES # BLD MANUAL: 0.6 10E3/UL (ref 0–1.3)
MONOCYTES NFR BLD AUTO: ABNORMAL %
MONOCYTES NFR BLD MANUAL: 5 %
NEUTROPHILS # BLD AUTO: ABNORMAL 10*3/UL
NEUTROPHILS # BLD MANUAL: 7.4 10E3/UL (ref 1.6–8.3)
NEUTROPHILS NFR BLD AUTO: ABNORMAL %
NEUTROPHILS NFR BLD MANUAL: 63 %
NRBC # BLD AUTO: 0.9 10E3/UL
NRBC # BLD AUTO: 1.2 10E3/UL
NRBC BLD AUTO-RTO: 8 /100
NRBC BLD MANUAL-RTO: 10 %
PLAT MORPH BLD: ABNORMAL
PLATELET # BLD AUTO: 384 10E3/UL (ref 150–450)
POLYCHROMASIA BLD QL SMEAR: SLIGHT
POTASSIUM SERPL-SCNC: 4.2 MMOL/L (ref 3.4–5.3)
PROT SERPL-MCNC: 7.7 G/DL (ref 6.4–8.3)
RBC # BLD AUTO: 2.1 10E6/UL (ref 3.8–5.2)
RBC MORPH BLD: ABNORMAL
RETICS # AUTO: 1.79 10E6/UL (ref 0.03–0.1)
RETICS/RBC NFR AUTO: 21.5 % (ref 0.5–2)
SICKLE CELLS BLD QL SMEAR: ABNORMAL
SODIUM SERPL-SCNC: 138 MMOL/L (ref 135–145)
TARGETS BLD QL SMEAR: SLIGHT
WBC # BLD AUTO: 11.7 10E3/UL (ref 4–11)

## 2024-08-16 PROCEDURE — 71046 X-RAY EXAM CHEST 2 VIEWS: CPT

## 2024-08-16 PROCEDURE — 85045 AUTOMATED RETICULOCYTE COUNT: CPT | Performed by: PHYSICIAN ASSISTANT

## 2024-08-16 PROCEDURE — 250N000013 HC RX MED GY IP 250 OP 250 PS 637

## 2024-08-16 PROCEDURE — 258N000003 HC RX IP 258 OP 636

## 2024-08-16 PROCEDURE — 99285 EMERGENCY DEPT VISIT HI MDM: CPT | Mod: 25 | Performed by: EMERGENCY MEDICINE

## 2024-08-16 PROCEDURE — 250N000011 HC RX IP 250 OP 636: Performed by: PHYSICIAN ASSISTANT

## 2024-08-16 PROCEDURE — 99223 1ST HOSP IP/OBS HIGH 75: CPT | Mod: FS

## 2024-08-16 PROCEDURE — 36415 COLL VENOUS BLD VENIPUNCTURE: CPT | Performed by: PHYSICIAN ASSISTANT

## 2024-08-16 PROCEDURE — 99207 PR APP CREDIT; MD BILLING SHARED VISIT: CPT | Performed by: INTERNAL MEDICINE

## 2024-08-16 PROCEDURE — 99418 PROLNG IP/OBS E/M EA 15 MIN: CPT | Mod: FS

## 2024-08-16 PROCEDURE — 96361 HYDRATE IV INFUSION ADD-ON: CPT | Performed by: EMERGENCY MEDICINE

## 2024-08-16 PROCEDURE — 99285 EMERGENCY DEPT VISIT HI MDM: CPT | Mod: FS | Performed by: EMERGENCY MEDICINE

## 2024-08-16 PROCEDURE — 96375 TX/PRO/DX INJ NEW DRUG ADDON: CPT | Performed by: EMERGENCY MEDICINE

## 2024-08-16 PROCEDURE — 80053 COMPREHEN METABOLIC PANEL: CPT | Performed by: PHYSICIAN ASSISTANT

## 2024-08-16 PROCEDURE — 258N000003 HC RX IP 258 OP 636: Performed by: PHYSICIAN ASSISTANT

## 2024-08-16 PROCEDURE — 96374 THER/PROPH/DIAG INJ IV PUSH: CPT | Performed by: EMERGENCY MEDICINE

## 2024-08-16 PROCEDURE — 96376 TX/PRO/DX INJ SAME DRUG ADON: CPT | Performed by: EMERGENCY MEDICINE

## 2024-08-16 PROCEDURE — 250N000011 HC RX IP 250 OP 636

## 2024-08-16 PROCEDURE — 120N000002 HC R&B MED SURG/OB UMMC

## 2024-08-16 PROCEDURE — 85014 HEMATOCRIT: CPT | Performed by: PHYSICIAN ASSISTANT

## 2024-08-16 PROCEDURE — 85007 BL SMEAR W/DIFF WBC COUNT: CPT | Performed by: PHYSICIAN ASSISTANT

## 2024-08-16 PROCEDURE — 84703 CHORIONIC GONADOTROPIN ASSAY: CPT | Performed by: PHYSICIAN ASSISTANT

## 2024-08-16 RX ORDER — CALCIUM CARBONATE 500 MG/1
1000 TABLET, CHEWABLE ORAL 4 TIMES DAILY PRN
Status: DISCONTINUED | OUTPATIENT
Start: 2024-08-16 | End: 2024-08-21 | Stop reason: HOSPADM

## 2024-08-16 RX ORDER — ENOXAPARIN SODIUM 100 MG/ML
1 INJECTION SUBCUTANEOUS ONCE
Status: COMPLETED | OUTPATIENT
Start: 2024-08-16 | End: 2024-08-16

## 2024-08-16 RX ORDER — SODIUM CHLORIDE, SODIUM LACTATE, POTASSIUM CHLORIDE, CALCIUM CHLORIDE 600; 310; 30; 20 MG/100ML; MG/100ML; MG/100ML; MG/100ML
INJECTION, SOLUTION INTRAVENOUS CONTINUOUS
Status: DISCONTINUED | OUTPATIENT
Start: 2024-08-16 | End: 2024-08-21 | Stop reason: HOSPADM

## 2024-08-16 RX ORDER — GABAPENTIN 300 MG/1
300 CAPSULE ORAL 3 TIMES DAILY
Status: DISCONTINUED | OUTPATIENT
Start: 2024-08-17 | End: 2024-08-21 | Stop reason: HOSPADM

## 2024-08-16 RX ORDER — LIDOCAINE 40 MG/G
CREAM TOPICAL
Status: DISCONTINUED | OUTPATIENT
Start: 2024-08-16 | End: 2024-08-21 | Stop reason: HOSPADM

## 2024-08-16 RX ORDER — FLUTICASONE FUROATE AND VILANTEROL 100; 25 UG/1; UG/1
1 POWDER RESPIRATORY (INHALATION) DAILY
Status: DISCONTINUED | OUTPATIENT
Start: 2024-08-17 | End: 2024-08-21 | Stop reason: HOSPADM

## 2024-08-16 RX ORDER — METOCLOPRAMIDE 5 MG/1
5 TABLET ORAL 3 TIMES DAILY PRN
Status: DISCONTINUED | OUTPATIENT
Start: 2024-08-16 | End: 2024-08-20

## 2024-08-16 RX ORDER — KETOROLAC TROMETHAMINE 30 MG/ML
30 INJECTION, SOLUTION INTRAMUSCULAR; INTRAVENOUS EVERY 6 HOURS PRN
Status: DISCONTINUED | OUTPATIENT
Start: 2024-08-17 | End: 2024-08-18

## 2024-08-16 RX ORDER — AMOXICILLIN 250 MG
2 CAPSULE ORAL 2 TIMES DAILY PRN
Status: DISCONTINUED | OUTPATIENT
Start: 2024-08-16 | End: 2024-08-21 | Stop reason: HOSPADM

## 2024-08-16 RX ORDER — ASPIRIN 81 MG/1
81 TABLET, CHEWABLE ORAL 2 TIMES DAILY
Status: DISCONTINUED | OUTPATIENT
Start: 2024-08-16 | End: 2024-08-21 | Stop reason: HOSPADM

## 2024-08-16 RX ORDER — HYDROXYUREA 500 MG/1
3000 CAPSULE ORAL DAILY
Status: DISCONTINUED | OUTPATIENT
Start: 2024-08-17 | End: 2024-08-20

## 2024-08-16 RX ORDER — METHOCARBAMOL 750 MG/1
750 TABLET, FILM COATED ORAL 4 TIMES DAILY PRN
Status: DISCONTINUED | OUTPATIENT
Start: 2024-08-16 | End: 2024-08-21 | Stop reason: HOSPADM

## 2024-08-16 RX ORDER — ENOXAPARIN SODIUM 100 MG/ML
1 INJECTION SUBCUTANEOUS EVERY 12 HOURS
Status: DISCONTINUED | OUTPATIENT
Start: 2024-08-17 | End: 2024-08-19

## 2024-08-16 RX ORDER — ACETAMINOPHEN 325 MG/1
975 TABLET ORAL EVERY 6 HOURS
Status: DISCONTINUED | OUTPATIENT
Start: 2024-08-16 | End: 2024-08-21 | Stop reason: HOSPADM

## 2024-08-16 RX ORDER — DEFERASIROX 360 MG/1
1440 TABLET, FILM COATED ORAL EVERY EVENING
Status: DISCONTINUED | OUTPATIENT
Start: 2024-08-17 | End: 2024-08-21 | Stop reason: HOSPADM

## 2024-08-16 RX ORDER — ALBUTEROL SULFATE 90 UG/1
2 AEROSOL, METERED RESPIRATORY (INHALATION) EVERY 6 HOURS PRN
Status: DISCONTINUED | OUTPATIENT
Start: 2024-08-16 | End: 2024-08-18

## 2024-08-16 RX ORDER — AMOXICILLIN 250 MG
1 CAPSULE ORAL 2 TIMES DAILY PRN
Status: DISCONTINUED | OUTPATIENT
Start: 2024-08-16 | End: 2024-08-21 | Stop reason: HOSPADM

## 2024-08-16 RX ORDER — KETOROLAC TROMETHAMINE 30 MG/ML
30 INJECTION, SOLUTION INTRAMUSCULAR; INTRAVENOUS ONCE
Status: COMPLETED | OUTPATIENT
Start: 2024-08-16 | End: 2024-08-16

## 2024-08-16 RX ADMIN — ASPIRIN 81 MG CHEWABLE TABLET 81 MG: 81 TABLET CHEWABLE at 22:29

## 2024-08-16 RX ADMIN — SODIUM CHLORIDE, POTASSIUM CHLORIDE, SODIUM LACTATE AND CALCIUM CHLORIDE 1000 ML: 600; 310; 30; 20 INJECTION, SOLUTION INTRAVENOUS at 19:19

## 2024-08-16 RX ADMIN — ENOXAPARIN SODIUM 70 MG: 80 INJECTION SUBCUTANEOUS at 22:06

## 2024-08-16 RX ADMIN — KETOROLAC TROMETHAMINE 30 MG: 30 INJECTION, SOLUTION INTRAMUSCULAR at 19:18

## 2024-08-16 RX ADMIN — HYDROMORPHONE HYDROCHLORIDE 1 MG: 1 INJECTION, SOLUTION INTRAMUSCULAR; INTRAVENOUS; SUBCUTANEOUS at 22:29

## 2024-08-16 RX ADMIN — HYDROMORPHONE HYDROCHLORIDE 1 MG: 1 INJECTION, SOLUTION INTRAMUSCULAR; INTRAVENOUS; SUBCUTANEOUS at 19:18

## 2024-08-16 RX ADMIN — SODIUM CHLORIDE, POTASSIUM CHLORIDE, SODIUM LACTATE AND CALCIUM CHLORIDE: 600; 310; 30; 20 INJECTION, SOLUTION INTRAVENOUS at 23:41

## 2024-08-16 RX ADMIN — HYDROMORPHONE HYDROCHLORIDE 1 MG: 1 INJECTION, SOLUTION INTRAMUSCULAR; INTRAVENOUS; SUBCUTANEOUS at 20:05

## 2024-08-16 RX ADMIN — ACETAMINOPHEN 975 MG: 325 TABLET ORAL at 22:29

## 2024-08-16 ASSESSMENT — ACTIVITIES OF DAILY LIVING (ADL)
ADLS_ACUITY_SCORE: 37
ADLS_ACUITY_SCORE: 37

## 2024-08-16 NOTE — ED PROVIDER NOTES
[facility] EMERGENCY DEPARTMENT ([facility])    8/16/24       ED PROVIDER NOTE    History     Chief Complaint   Patient presents with    Sickle Cell Pain Crisis     Complains of pain to extremities. Reports receiving Jr infusions, and a side effect is joint pain.  She believes she may be experiencing the side effect.     HPI  25yo F pmhx sickle cell disease, moderately severe restriction on PFTs, prior CVA, hepatic hemachromatosis p/w diffuse b/l arm and leg pain consitsent with her prior sickle crisis.  Began this morning.  Has been refractory to oxycodone and ibuprofen.  Denies associated CP, SOB, cough, fever, chills, abdominal pain, nausea, vomiting, diarrhea.      Past Medical History  Past Medical History:   Diagnosis Date    Anxiety     Bleeding disorder (H24)     Blood clotting disorder (H24)     Cerebral infarction (H) 2015    Cognitive developmental delay     low IQ. 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 side (H)     right hand contractures    Iron overload due to repeated red blood cell transfusions     Migraines     Multiple subsegmental pulmonary emboli without acute cor pulmonale (H) 02/01/2021    Oppositional defiant behavior     Presence of intrauterine contraceptive device 2/18/2020    Superficial venous thrombosis of arm, right 03/25/2021    Uncomplicated asthma      Past Surgical History:   Procedure Laterality Date    AS INSERT TUNNELED CV 2 CATH W/O PORT/PUMP      CHOLECYSTECTOMY      CV RIGHT HEART CATH MEASUREMENTS RECORDED N/A 11/18/2021    Procedure: Right Heart Cath;  Surgeon: Jackson Stauffer MD;  Location:  HEART CARDIAC CATH LAB    INSERT PORT VASCULAR ACCESS Left 4/21/2021    Procedure: INSERTION, VASCULAR ACCESS PORT (NOT SURE ON SIDE UNTIL REMOVAL);  Surgeon: Rajan More MD;  Location: [facility] OR    IR CHEST PORT PLACEMENT > 5 YRS OF AGE   4/21/2021    IR CVC NON TUNNEL LINE REMOVAL  5/6/2021    IR CVC NON TUNNEL PLACEMENT > 5 YRS  04/07/2020    IR CVC NON TUNNEL PLACEMENT > 5 YRS  4/30/2021    IR CVC NON TUNNEL PLACEMENT > 5 YRS  9/7/2022    IR PORT REMOVAL LEFT  4/21/2021    REMOVE PORT VASCULAR ACCESS Left 4/21/2021    Procedure: REMOVAL, VASCULAR ACCESS PORT LEFT;  Surgeon: Rajan More MD;  Location: [facility] OR    REPAIR TENDON ELBOW Right 10/02/2019    Procedure: Right Elbow Flexor Lengthening, Flexor Pronator Slide Of Wrist and Finger, Thumb Adductor Lengthening;  Surgeon: Anai Franco MD;  Location: [facility] OR    TONSILLECTOMY Bilateral 10/02/2019    Procedure: Bilateral Tonsillectomy;  Surgeon: Farhana Guy MD;  Location: [facility] OR    ZZC BREAST REDUCTION (INCLUDES LIPO) TIER 3 Bilateral 04/23/2019     acetaminophen (TYLENOL) 325 MG tablet  albuterol (PROAIR HFA/PROVENTIL HFA/VENTOLIN HFA) 108 (90 Base) MCG/ACT inhaler  albuterol (PROVENTIL) (2.5 MG/3ML) 0.083% neb solution  aspirin (ASA) 81 MG chewable tablet  budesonide-formoterol (SYMBICORT) 160-4.5 MCG/ACT Inhaler  deferasirox (JADENU) 360 MG tablet  deferiprone (FERRIPROX) 1000 MG TABS tablet  EPINEPHrine (ANY BX GENERIC EQUIV) 0.3 MG/0.3ML injection 2-pack  gabapentin (NEURONTIN) 300 MG capsule  hydroxyurea (HYDREA) 500 MG capsule  ibuprofen (ADVIL/MOTRIN) 600 MG tablet  ibuprofen (ADVIL/MOTRIN) 800 MG tablet  melatonin 5 MG tablet  methocarbamol (ROBAXIN) 750 MG tablet  metoclopramide (REGLAN) 5 MG tablet  naloxone (NARCAN) 4 MG/0.1ML nasal spray  naloxone (NARCAN) 4 MG/0.1ML nasal spray  omeprazole (PRILOSEC) 20 MG DR capsule  ondansetron (ZOFRAN ODT) 8 MG ODT tab  oxyCODONE IR (ROXICODONE) 10 MG tablet      Allergies   Allergen Reactions    Contrast Dye Angioedema     Hives and breathing issues    Fish-Derived Products Hives    Seafood Hives    Adhesive Tape Hives     Primipore dressing causes hives    Gadolinium     Iodinated Contrast Media      Family History  Family  History   Problem Relation Age of Onset    Sickle Cell Trait Mother     Hypertension Mother     Asthma Mother     Sickle Cell Trait Father     Glaucoma No family hx of     Macular Degeneration No family hx of     Diabetes No family hx of     Gout No family hx of      Social History   Social History     Tobacco Use    Smoking status: Never     Passive exposure: Never    Smokeless tobacco: Never   Substance Use Topics    Alcohol use: Not Currently     Alcohol/week: 0.0 standard drinks of alcohol    Drug use: Never      A complete review of systems was performed with pertinent positives and negatives noted in the HPI, and all other systems negative.    Physical Exam   BP: 124/75  Pulse: 97  Temp: 98.2  F (36.8  C)  Resp: 16  SpO2: (!) 89 % /75   Pulse 97   Temp 98.2  F (36.8  C) (Oral)   Resp 16   LMP  (LMP Unknown)   SpO2 (!) 89%     Physical Exam  Constitutional:       General: She is not in acute distress.     Appearance: Normal appearance. She is not diaphoretic.   HENT:      Head: Atraumatic.      Mouth/Throat:      Mouth: Mucous membranes are moist.   Eyes:      Conjunctiva/sclera: Conjunctivae normal.   Cardiovascular:      Rate and Rhythm: Normal rate and regular rhythm.      Heart sounds: Normal heart sounds.   Pulmonary:      Effort: Pulmonary effort is normal. No respiratory distress.      Breath sounds: Normal breath sounds.   Abdominal:      General: Abdomen is flat.   Musculoskeletal:      Cervical back: Neck supple.   Skin:     General: Skin is warm.   Neurological:      Mental Status: She is alert.           ED Course, Procedures, & Data      Procedures                Results for orders placed or performed during the hospital encounter of 08/16/24   XR Chest 2 Views     Status: None    Narrative    EXAM: XR CHEST 2 VIEWS  LOCATION: [facility]  DATE: 8/16/2024    INDICATION: Bilateral arm and lower extremity pain; sickle cell disease.  COMPARISON:  6/25/2024.      Impression    IMPRESSION: No acute airspace consolidation. No pleural effusion or pneumothorax.    Cardiomediastinal silhouette is normal.    Left chest wall port catheter is present. Cholecystectomy.   Reticulocyte count     Status: Abnormal   Result Value Ref Range    % Reticulocyte 21.5 (H) 0.5 - 2.0 %    Absolute Reticulocyte 1.790 (H) 0.025 - 0.095 10e6/uL   HCG qualitative pregnancy (blood)     Status: Normal   Result Value Ref Range    hCG Serum Qualitative Negative Negative   Comprehensive metabolic panel     Status: Abnormal   Result Value Ref Range    Sodium 138 135 - 145 mmol/L    Potassium 4.2 3.4 - 5.3 mmol/L    Carbon Dioxide (CO2) 23 22 - 29 mmol/L    Anion Gap 11 7 - 15 mmol/L    Urea Nitrogen 5.9 (L) 6.0 - 20.0 mg/dL    Creatinine 0.54 0.51 - 0.95 mg/dL    GFR Estimate >90 >60 mL/min/1.73m2    Calcium 8.9 8.8 - 10.4 mg/dL    Chloride 104 98 - 107 mmol/L    Glucose 98 70 - 99 mg/dL    Alkaline Phosphatase 58 40 - 150 U/L    AST 77 (H) 0 - 45 U/L    ALT 41 0 - 50 U/L    Protein Total 7.7 6.4 - 8.3 g/dL    Albumin 4.5 3.5 - 5.2 g/dL    Bilirubin Total 3.7 (H) <=1.2 mg/dL   CBC with platelets and differential     Status: Abnormal   Result Value Ref Range    WBC Count 11.7 (H) 4.0 - 11.0 10e3/uL    RBC Count 2.10 (L) 3.80 - 5.20 10e6/uL    Hemoglobin 6.6 (LL) 11.7 - 15.7 g/dL    Hematocrit 17.9 (L) 35.0 - 47.0 %    MCV 85 78 - 100 fL    MCH 31.4 26.5 - 33.0 pg    MCHC 36.9 (H) 31.5 - 36.5 g/dL    RDW 27.5 (H) 10.0 - 15.0 %    Platelet Count 384 150 - 450 10e3/uL    % Neutrophils      % Lymphocytes      % Monocytes      % Eosinophils      % Basophils      % Immature Granulocytes      NRBCs per 100 WBC 8 (H) <1 /100    Absolute Neutrophils      Absolute Lymphocytes      Absolute Monocytes      Absolute Eosinophils      Absolute Basophils      Absolute Immature Granulocytes      Absolute NRBCs 0.9 10e3/uL   Manual Differential     Status: Abnormal   Result Value Ref Range    % Neutrophils 63  %    % Lymphocytes 22 %    % Monocytes 5 %    % Eosinophils 6 %    % Basophils 3 %    % Metamyelocytes 1 %    NRBCs per 100 WBC 10 (H) <=0 %    Absolute Neutrophils 7.4 1.6 - 8.3 10e3/uL    Absolute Lymphocytes 2.6 0.8 - 5.3 10e3/uL    Absolute Monocytes 0.6 0.0 - 1.3 10e3/uL    Absolute Eosinophils 0.7 0.0 - 0.7 10e3/uL    Absolute Basophils 0.4 (H) 0.0 - 0.2 10e3/uL    Absolute Metamyelocytes 0.1 (H) <=0.0 10e3/uL    Absolute NRBCs 1.2 (H) <=0.0 10e3/uL    RBC Morphology Confirmed RBC Indices     Platelet Assessment  Automated Count Confirmed. Platelet morphology is normal.     Automated Count Confirmed. Platelet morphology is normal.    Polychromasia Slight (A) None Seen    Sickle Cells Marked (A) None Seen    Target Cells Slight (A) None Seen   CBC with platelets differential     Status: Abnormal    Narrative    The following orders were created for panel order CBC with platelets differential.  Procedure                               Abnormality         Status                     ---------                               -----------         ------                     CBC with platelets and d...[[id number]]  Abnormal            Final result               Manual Differential[[id number]]          Abnormal            Final result                 Please view results for these tests on the individual orders.     Medications   enoxaparin ANTICOAGULANT (LOVENOX) injection 70 mg (has no administration in time range)   lactated ringers BOLUS 1,000 mL (0 mLs Intravenous Stopped 8/16/24 2050)   HYDROmorphone (DILAUDID) injection 1 mg (1 mg Intravenous $Given 8/16/24 1918)   ketorolac (TORADOL) injection 30 mg (30 mg Intravenous $Given 8/16/24 1918)   HYDROmorphone (DILAUDID) injection 1 mg (1 mg Intravenous $Given 8/16/24 2005)     Labs Ordered and Resulted from Time of ED Arrival to Time of ED Departure   RETICULOCYTE COUNT - Abnormal       Result Value    % Reticulocyte 21.5 (*)     Absolute Reticulocyte 1.790 (*)     COMPREHENSIVE METABOLIC PANEL - Abnormal    Sodium 138      Potassium 4.2      Carbon Dioxide (CO2) 23      Anion Gap 11      Urea Nitrogen 5.9 (*)     Creatinine 0.54      GFR Estimate >90      Calcium 8.9      Chloride 104      Glucose 98      Alkaline Phosphatase 58      AST 77 (*)     ALT 41      Protein Total 7.7      Albumin 4.5      Bilirubin Total 3.7 (*)    CBC WITH PLATELETS AND DIFFERENTIAL - Abnormal    WBC Count 11.7 (*)     RBC Count 2.10 (*)     Hemoglobin 6.6 (*)     Hematocrit 17.9 (*)     MCV 85      MCH 31.4      MCHC 36.9 (*)     RDW 27.5 (*)     Platelet Count 384      % Neutrophils        % Lymphocytes        % Monocytes        % Eosinophils        % Basophils        % Immature Granulocytes        NRBCs per 100 WBC 8 (*)     Absolute Neutrophils        Absolute Lymphocytes        Absolute Monocytes        Absolute Eosinophils        Absolute Basophils        Absolute Immature Granulocytes        Absolute NRBCs 0.9     DIFFERENTIAL - Abnormal    % Neutrophils 63      % Lymphocytes 22      % Monocytes 5      % Eosinophils 6      % Basophils 3      % Metamyelocytes 1      NRBCs per 100 WBC 10 (*)     Absolute Neutrophils 7.4      Absolute Lymphocytes 2.6      Absolute Monocytes 0.6      Absolute Eosinophils 0.7      Absolute Basophils 0.4 (*)     Absolute Metamyelocytes 0.1 (*)     Absolute NRBCs 1.2 (*)     RBC Morphology Confirmed RBC Indices      Platelet Assessment        Value: Automated Count Confirmed. Platelet morphology is normal.    Polychromasia Slight (*)     Sickle Cells Marked (*)     Target Cells Slight (*)    HCG QUALITATIVE PREGNANCY - Normal    hCG Serum Qualitative Negative       XR Chest 2 Views   Final Result   IMPRESSION: No acute airspace consolidation. No pleural effusion or pneumothorax.      Cardiomediastinal silhouette is normal.      Left chest wall port catheter is present. Cholecystectomy.             Critical care was not performed.     Medical Decision Making  The  patient's presentation was of high complexity (a chronic illness severe exacerbation, progression, or side effect of treatment).    The patient's evaluation involved:  review of external note(s) from 3+ sources (clinical)  review of 3+ test result(s) ordered prior to this encounter (see separate area of note for details)  ordering and/or review of 3+ test(s) in this encounter (see separate area of note for details)    The patient's management necessitated high risk (a decision regarding hospitalization).    Assessment & Plan    23yo F pmhx sickle cell disease, moderately severe restriction on PFTs, prior CVA, hepatic hemachromatosis p/w diffuse b/l arm and leg pain consitsent with her prior sickle crisis.  No CP, SOB, cough, f/c, abdominal  pain, nausea, vomiting, diarrhea.    In ED patient is HDS/AF, NAD.  She is noted to be hypoxemic at 89%.  Though she has clear lungs and no SOB or chest pain.  Chart review shows that she was similarly hypoxemic on ED visit on 8/4/2024 and 8/6/24, with patient reporting this is common when she is anemic (hemoglobin yesterday 6.8, which appears to be patient's baseline).    Although suspicion for acute chest is low with no SOB, CP or fever, will x-ray was obtained and negative.  CBC showed baseline anemia of 6.6.  Elevated retake count.  CMP and pregnancy negative.  Patient received ketorolac and Dilaudid x 2 as per care plan with significant treatment symptoms.  However, given patient's persistent hypoxemia had planned for CT PE study.  However, patient with significant (angioedema) contrast allergy.  Although patient asymptomatic without CP or SOB, feel she is high enough risk that she warrants admission for VQ scan.  Will empirically cover with Lovenox for tonight.    I have reviewed the nursing notes. I have reviewed the findings, diagnosis, plan and need for follow up with the patient.    New Prescriptions    No medications on file       Final diagnoses:   Hypoxia   Sickle  cell disease with crisis (H)         Danyel Blackburn PA-C  [facility] EMERGENCY DEPARTMENT  8/16/2024     Danyel Blackburn PA-C  08/16/24 [id number]

## 2024-08-16 NOTE — ED TRIAGE NOTES
Triage Assessment (Adult)       Row Name 08/16/24 1756          Triage Assessment    Airway WDL WDL        Respiratory WDL    Respiratory WDL WDL        Skin Circulation/Temperature WDL    Skin Circulation/Temperature WDL WDL        Cardiac WDL    Cardiac WDL WDL        Peripheral/Neurovascular WDL    Peripheral Neurovascular WDL WDL        Cognitive/Neuro/Behavioral WDL    Cognitive/Neuro/Behavioral WDL WDL

## 2024-08-17 LAB
ANION GAP SERPL CALCULATED.3IONS-SCNC: 7 MMOL/L (ref 7–15)
BASOPHILS # BLD AUTO: 0.5 10E3/UL (ref 0–0.2)
BASOPHILS NFR BLD AUTO: 4 %
BILIRUB SERPL-MCNC: 3.3 MG/DL
BUN SERPL-MCNC: 4.3 MG/DL (ref 6–20)
CALCIUM SERPL-MCNC: 8 MG/DL (ref 8.8–10.4)
CHLORIDE SERPL-SCNC: 106 MMOL/L (ref 98–107)
CREAT SERPL-MCNC: 0.53 MG/DL (ref 0.51–0.95)
EGFRCR SERPLBLD CKD-EPI 2021: >90 ML/MIN/1.73M2
EOSINOPHIL # BLD AUTO: 1 10E3/UL (ref 0–0.7)
EOSINOPHIL NFR BLD AUTO: 9 %
ERYTHROCYTE [DISTWIDTH] IN BLOOD BY AUTOMATED COUNT: 26.7 % (ref 10–15)
FLUAV RNA SPEC QL NAA+PROBE: NEGATIVE
FLUBV RNA RESP QL NAA+PROBE: NEGATIVE
GLUCOSE SERPL-MCNC: 91 MG/DL (ref 70–99)
HCO3 SERPL-SCNC: 26 MMOL/L (ref 22–29)
HCT VFR BLD AUTO: 17.8 % (ref 35–47)
HGB BLD-MCNC: 6.5 G/DL (ref 11.7–15.7)
IMM GRANULOCYTES # BLD: 0.2 10E3/UL
IMM GRANULOCYTES NFR BLD: 1 %
LYMPHOCYTES # BLD AUTO: 4.2 10E3/UL (ref 0.8–5.3)
LYMPHOCYTES NFR BLD AUTO: 37 %
MCH RBC QN AUTO: 31.4 PG (ref 26.5–33)
MCHC RBC AUTO-ENTMCNC: 36.5 G/DL (ref 31.5–36.5)
MCV RBC AUTO: 86 FL (ref 78–100)
MONOCYTES # BLD AUTO: 0.5 10E3/UL (ref 0–1.3)
MONOCYTES NFR BLD AUTO: 5 %
NEUTROPHILS # BLD AUTO: 4.9 10E3/UL (ref 1.6–8.3)
NEUTROPHILS NFR BLD AUTO: 43 %
NRBC # BLD AUTO: 1 10E3/UL
NRBC BLD AUTO-RTO: 9 /100
PLATELET # BLD AUTO: 405 10E3/UL (ref 150–450)
POTASSIUM SERPL-SCNC: 3.7 MMOL/L (ref 3.4–5.3)
RBC # BLD AUTO: 2.07 10E6/UL (ref 3.8–5.2)
RETICS # AUTO: 0.49 10E6/UL (ref 0.03–0.1)
RETICS/RBC NFR AUTO: 23.6 % (ref 0.5–2)
RSV RNA SPEC NAA+PROBE: NEGATIVE
SARS-COV-2 RNA RESP QL NAA+PROBE: NEGATIVE
SODIUM SERPL-SCNC: 139 MMOL/L (ref 135–145)
WBC # BLD AUTO: 11.4 10E3/UL (ref 4–11)

## 2024-08-17 PROCEDURE — 85025 COMPLETE CBC W/AUTO DIFF WBC: CPT

## 2024-08-17 PROCEDURE — 250N000011 HC RX IP 250 OP 636: Performed by: HOSPITALIST

## 2024-08-17 PROCEDURE — 94640 AIRWAY INHALATION TREATMENT: CPT

## 2024-08-17 PROCEDURE — 85045 AUTOMATED RETICULOCYTE COUNT: CPT

## 2024-08-17 PROCEDURE — 99222 1ST HOSP IP/OBS MODERATE 55: CPT | Performed by: INTERNAL MEDICINE

## 2024-08-17 PROCEDURE — 120N000002 HC R&B MED SURG/OB UMMC

## 2024-08-17 PROCEDURE — 80048 BASIC METABOLIC PNL TOTAL CA: CPT

## 2024-08-17 PROCEDURE — 82247 BILIRUBIN TOTAL: CPT

## 2024-08-17 PROCEDURE — 258N000003 HC RX IP 258 OP 636: Performed by: HOSPITALIST

## 2024-08-17 PROCEDURE — 250N000011 HC RX IP 250 OP 636

## 2024-08-17 PROCEDURE — 250N000009 HC RX 250

## 2024-08-17 PROCEDURE — 250N000013 HC RX MED GY IP 250 OP 250 PS 637

## 2024-08-17 PROCEDURE — 36592 COLLECT BLOOD FROM PICC: CPT

## 2024-08-17 PROCEDURE — 87637 SARSCOV2&INF A&B&RSV AMP PRB: CPT

## 2024-08-17 PROCEDURE — 99233 SBSQ HOSP IP/OBS HIGH 50: CPT | Performed by: HOSPITALIST

## 2024-08-17 RX ORDER — IPRATROPIUM BROMIDE AND ALBUTEROL SULFATE 2.5; .5 MG/3ML; MG/3ML
3 SOLUTION RESPIRATORY (INHALATION)
Status: DISCONTINUED | OUTPATIENT
Start: 2024-08-17 | End: 2024-08-17

## 2024-08-17 RX ORDER — IPRATROPIUM BROMIDE AND ALBUTEROL SULFATE 2.5; .5 MG/3ML; MG/3ML
3 SOLUTION RESPIRATORY (INHALATION) EVERY 4 HOURS PRN
Status: DISCONTINUED | OUTPATIENT
Start: 2024-08-17 | End: 2024-08-21 | Stop reason: HOSPADM

## 2024-08-17 RX ADMIN — ENOXAPARIN SODIUM 70 MG: 80 INJECTION SUBCUTANEOUS at 20:13

## 2024-08-17 RX ADMIN — GABAPENTIN 300 MG: 300 CAPSULE ORAL at 13:14

## 2024-08-17 RX ADMIN — SODIUM CHLORIDE, POTASSIUM CHLORIDE, SODIUM LACTATE AND CALCIUM CHLORIDE: 600; 310; 30; 20 INJECTION, SOLUTION INTRAVENOUS at 09:49

## 2024-08-17 RX ADMIN — OMEPRAZOLE 20 MG: 20 CAPSULE, DELAYED RELEASE ORAL at 08:41

## 2024-08-17 RX ADMIN — HYDROMORPHONE HYDROCHLORIDE 1 MG: 1 INJECTION, SOLUTION INTRAMUSCULAR; INTRAVENOUS; SUBCUTANEOUS at 18:13

## 2024-08-17 RX ADMIN — HYDROMORPHONE HYDROCHLORIDE 1 MG: 1 INJECTION, SOLUTION INTRAMUSCULAR; INTRAVENOUS; SUBCUTANEOUS at 09:02

## 2024-08-17 RX ADMIN — ACETAMINOPHEN 975 MG: 325 TABLET ORAL at 04:47

## 2024-08-17 RX ADMIN — ASPIRIN 81 MG CHEWABLE TABLET 81 MG: 81 TABLET CHEWABLE at 08:41

## 2024-08-17 RX ADMIN — HYDROMORPHONE HYDROCHLORIDE 1 MG: 1 INJECTION, SOLUTION INTRAMUSCULAR; INTRAVENOUS; SUBCUTANEOUS at 04:47

## 2024-08-17 RX ADMIN — HYDROMORPHONE HYDROCHLORIDE 1 MG: 1 INJECTION, SOLUTION INTRAMUSCULAR; INTRAVENOUS; SUBCUTANEOUS at 02:40

## 2024-08-17 RX ADMIN — HYDROMORPHONE HYDROCHLORIDE 1 MG: 1 INJECTION, SOLUTION INTRAMUSCULAR; INTRAVENOUS; SUBCUTANEOUS at 00:32

## 2024-08-17 RX ADMIN — HYDROMORPHONE HYDROCHLORIDE 1 MG: 1 INJECTION, SOLUTION INTRAMUSCULAR; INTRAVENOUS; SUBCUTANEOUS at 06:52

## 2024-08-17 RX ADMIN — HYDROMORPHONE HYDROCHLORIDE 1 MG: 1 INJECTION, SOLUTION INTRAMUSCULAR; INTRAVENOUS; SUBCUTANEOUS at 16:09

## 2024-08-17 RX ADMIN — ASPIRIN 81 MG CHEWABLE TABLET 81 MG: 81 TABLET CHEWABLE at 20:11

## 2024-08-17 RX ADMIN — HYDROMORPHONE HYDROCHLORIDE 1 MG: 1 INJECTION, SOLUTION INTRAMUSCULAR; INTRAVENOUS; SUBCUTANEOUS at 22:30

## 2024-08-17 RX ADMIN — DEFERASIROX 1440 MG: 360 TABLET, FILM COATED ORAL at 20:12

## 2024-08-17 RX ADMIN — ENOXAPARIN SODIUM 70 MG: 80 INJECTION SUBCUTANEOUS at 08:39

## 2024-08-17 RX ADMIN — GABAPENTIN 300 MG: 300 CAPSULE ORAL at 20:13

## 2024-08-17 RX ADMIN — HYDROXYUREA 3000 MG: 500 CAPSULE ORAL at 08:41

## 2024-08-17 RX ADMIN — HYDROMORPHONE HYDROCHLORIDE 1 MG: 1 INJECTION, SOLUTION INTRAMUSCULAR; INTRAVENOUS; SUBCUTANEOUS at 20:13

## 2024-08-17 RX ADMIN — HYDROMORPHONE HYDROCHLORIDE 1 MG: 1 INJECTION, SOLUTION INTRAMUSCULAR; INTRAVENOUS; SUBCUTANEOUS at 13:14

## 2024-08-17 RX ADMIN — SODIUM CHLORIDE, POTASSIUM CHLORIDE, SODIUM LACTATE AND CALCIUM CHLORIDE: 600; 310; 30; 20 INJECTION, SOLUTION INTRAVENOUS at 21:42

## 2024-08-17 RX ADMIN — ACETAMINOPHEN 975 MG: 325 TABLET ORAL at 22:30

## 2024-08-17 RX ADMIN — GABAPENTIN 300 MG: 300 CAPSULE ORAL at 08:41

## 2024-08-17 RX ADMIN — IPRATROPIUM BROMIDE AND ALBUTEROL SULFATE 3 ML: .5; 3 SOLUTION RESPIRATORY (INHALATION) at 17:50

## 2024-08-17 RX ADMIN — METHOCARBAMOL 750 MG: 750 TABLET ORAL at 08:41

## 2024-08-17 RX ADMIN — KETOROLAC TROMETHAMINE 30 MG: 30 INJECTION, SOLUTION INTRAMUSCULAR at 08:40

## 2024-08-17 RX ADMIN — KETOROLAC TROMETHAMINE 30 MG: 30 INJECTION, SOLUTION INTRAMUSCULAR at 01:29

## 2024-08-17 ASSESSMENT — ACTIVITIES OF DAILY LIVING (ADL)
ADLS_ACUITY_SCORE: 37
ADLS_ACUITY_SCORE: 22
ADLS_ACUITY_SCORE: 37
ADLS_ACUITY_SCORE: 22
DEPENDENT_IADLS:: TRANSPORTATION
ADLS_ACUITY_SCORE: 22

## 2024-08-17 NOTE — CONSULTS
Hematology Consult Note    History  Jennifer Cervantes is a 24-year old woman with sickle cell disease (HbSS) complicated by frequent pain crises, stroke, iron overload, and thromboembolic events in 2021 not on anticoagulation who presents with generalized pain and was found to be hypoxic in the emergency department although she did not herself report respiratory complaints.  Her oxygen was noted to be in the 80s on room air and while chest radiograph showed no consolidations or other acute abnormalities, CT angiogram could not be performed due to contrast allergy and therefore she was started on empiric Lovenox for possible pulmonary embolism and a V/Q scan is planned.  Hematology was asked to consult during this current admission.      She reports that her pain is generalized although she cannot further specify which locations of her body it is at.  Of note, however, she specifically denies any chest pain or any shortness of breath.  She cannot identify any specific events that may have triggered this episode of pain.  She was on 3L supplemental oxygen when I met with her.    Laboratory Data  I personally reviewed and interpreted the pertinent results as follows:  --Hemoglobin is 6.5 which is at/near baseline and would not recommend transfusion at this time  --WBC is 11.4 and is at her baseline (which is chronic mild leukocytosis)  --Platelets are normal at 405  --Creatinine is 0.53 and renal function (by eGFR) is normal  --Total bilirubin is 3.3 and reticulocyte count is 23.6% (both within her baseline ranges) reflecting ongoing hemolysis   --Chest radiograph showed no acute consolidations or other abnormalities     Impression  Based on the available history, my independent interpretation of the laboratory test results and discussion with the patient, I believe that she is experiencing (1) hypoxia of unclear etiology for which pulmonary embolism workup is underway and is very reasonable given her prior history and (2) a  sickle cell VOC which is relatively uncomplicated with no clear current evidence to support acute chest syndrome (although she has hypoxia there are no consolidations on chest imaging). Also of note, she had a similar presentation with hypoxia to the 80s in 4/2024 and a V/W scan was negative for PE at that time and TTE showed normal PASP.  She does have a history of asthma exacerbation and this could be on the differential diagnosis as well, although again she does not self-report respiratory symptoms.    Recommendations:  Hypoxia  --Reasonable to pursue V/Q scan for rule out of pulmonary embolism as you are doing and to continue empiric Lovenox (treatment dose) until that scan is done  --Could consider repeat TTE (last done 4/2024)  --Would also consider asthma exacerbation on the differential  --Wean oxygen as tolerated  Acute Vaso-Occlusive Pain Episode  --Continue aggressive pain control per pain plan/primary team  --Continue standing Tylenol and can use Toradol as well    --Aggressive bowel regimen  --Consider topicals (lidocaine patch, Voltaren gel, warm packs) if helpful  --Please avoid IV benadryl  Acute Chest Syndrome (ACS) Prevention  --Monitor oxygenation and respiratory rate carefully  --Encourage incentive spirometry and ambulation  --Monitor for fluid overload  --Please page Hematology if patient develops fever, acute shortness of breath, or other new symptoms concerning for acute chest syndrome  General Sickle Cell Management  --Daily CBC with differential, bilirubin, retic count for now  --Please discuss transfusion with Hematology first if primary team is considering this  --Continue folic acid and Hydrea   Iron Overload  --Continue Jadenu    We will continue to follow with you.    Overall, I spent 60 minutes today (DOS) face-to-face and/or coordinating care as documented above and specifically listed as below:  --Reviewing documentation related to patient's history and this current admission as  well as notes from prior outpatient and inpatient visits available in [facility]   --Review and clinical interpretation of pertinent laboratory test results and radiology reports from this admission  --Discussion with the patient via video  --Discussion with patient's primary care team  --Documentation in the electronic health record    Estefany Thibodeaux MD  Date of Service: 8/17/2024     Visit/Communication Style   Virtual (Video) communication was used to evaluate Jennifer.  Jennifer consented to the use of video communication.  Patient's location: [facility] MED SURG ORTHOPEDIC  Provider's location during the visit: [facility]

## 2024-08-17 NOTE — PLAN OF CARE
Goal Outcome Evaluation:      Plan of Care Reviewed With: patient    Overall Patient Progress: improvingOverall Patient Progress: improving    Outcome Evaluation: Plan to discharge to home once sickle cell crisis is over and medically cleared.

## 2024-08-17 NOTE — PLAN OF CARE
VS: Patient refusing    O2: 3L O2 per NC   Output: Continent of bowel and bladder   Last BM:    Activity: Up ad janett, no restrictions   Skin: Visualized skin intact   Pain: Managed with medication and position   CMS: intact   Dressing: na   Diet: regular   LDA: Chest port   Equipment: Iv pump, call light, personal items   Plan: Continue plan of care, pain management, oxygenation, nuclear medicine imaging of chest   Additional Info: Patient is alert and oriented. Very soft spoken. Refusing some cares and medications. Was weepy in AM when vitals were to be taken. Calmed down and resting. Patient did not want to eat morning meal. Patient has RUE weakness, wrist and elbow contraction due to previous CVA. Mood is much better after having slept. Call light in reach.

## 2024-08-17 NOTE — PLAN OF CARE
Ortho Admission Note    Reason for admission: hypoxia, sickle cell crisis  Primary team notified of pt arrival.  Admitted from: [facility] ED  Via: Cart  Accompanied by: self  Belongings: in room with patient  Admission Required Doc Completed: Yes  Teaching: Orientation to unit and call light- call light within reach, use of console, meal times, when to call for the RN, and enforced importance of safety.  IV Access: port a cath L chest  Telemetry: No  Ht./Wt.: Completed  Code Status verified on armband: Yes  2 RN Skin Assessment Completed with: Nick Blanco  Suction/Ambu bag/Flowmeter at bedside: Yes    Pt status:    A/Ox4. 3 L O2  Continent B/B.   Independent  Contact precautions maintained  Pain managed with PRN IV dilaudid  L chest lio cath running  mL/hour  Hemoglobin morning lab value - 6.5. Providers aware. Will hold on transfusion until day team discusses with hematology - per provider.       Temp:  [97.7  F (36.5  C)-98.2  F (36.8  C)] 97.7  F (36.5  C)  Pulse:  [65-97] 65  Resp:  [16-17] 17  BP: (113-124)/(75-84) 122/84  SpO2:  [89 %-99 %] 97 %

## 2024-08-17 NOTE — H&P
[facility]    History and Physical - Hospitalist Service, GOLD TEAM        Date of Admission:  8/16/2024    Assessment & Plan      Jennifer Cervantes is a 24 year old female with a history of sickle cell anemia, cognitive delays and right upper extremity hemiparesis due to past CVA, iron overload, anxiety and depression, who was admitted to [facility] on 8/16/2024 for further evaluation and treatment of acute pain event and rule out of PE.     Hypoxia  Asthma  In the ED, noted to be hypoxic to 89% and again to low 80's on recheck. CXR unremarkable. Unable to obtain CTPE to rule out PE due to contrast allergy and unable to have VQ study done this evening. Started empirically on Lovenox by ED team. Patient overall asymptomatic--denies shortness of breath, weakness, fatigue, dizziness. Given she is persistently hypoxic, will rule out viral process. Low clinical concern for bacterial etiology or asthma exacerbation.   - Continue Lovenox for now  - VQ scan in AM  - Continuous pulse ox  - Wean oxygen as tolerated  - Viral studies  - Continue PTA albuterol PRN and Breo Ellipta (subbed for Symbicort)    Acute Pain Event  Sickle Cell Anemia  Follows closely with [facility] Hematology as outpatient. Last seen day prior to admission. Reports she received her Jr infusion day prior to admission, which typically precipitates pain events for her thus presented to the ED for further management. In the ED, labs near baseline. Patient with hypoxia as noted above, but otherwise hemodynamically stable. Overall, feels pain responsive to current regimen.   - Consider hematology consult in AM   - Pain management   - Dilaudid 1 mg IV q2h PRN (less than assumed dosing of 1 mg every hour on care plan per patient request)   - Toradol 30 mg q6h scheduled   - Tylenol 1,000 mg q6h scheduled   - Gabapentin TID scheduled   - Methocarbamol 750 mg QID PRN  - Follow CBC with diff, retic, bilirubin  - LR infusion for  "now  - Continue PTA hydroxyurea    Chronic abdominal pain  Chronic nausea   Ongoing, chronic issue. Does not feel worse on admission. PTA on Reglan & omeprazole.   - Continue PTA reglan & omeprazole  - Check Qtc (last checked in early July)  - Follow up with GI in November    Hemochromatosis Continue PTA Jadenu.   History of CVA Continue PTA ASA.         Diet: Combination Diet Regular Diet Adult  DVT Prophylaxis: Lovenox  Lopez Catheter: Not present  Lines: PRESENT             Cardiac Monitoring: None  Code Status: Full Code    Clinically Significant Risk Factors Present on Admission                # Drug Induced Platelet Defect: home medication list includes an antiplatelet medication      # Anemia: based on hgb <11       # Overweight: Estimated body mass index is 26.62 kg/m  as calculated from the following:    Height as of 8/6/24: 1.626 m (5' 4\").    Weight as of 8/15/24: 70.4 kg (155 lb 1.6 oz).         # Financial/Environmental Concerns:    # Asthma: noted on problem list              Disposition Plan     Medically Ready for Discharge: Anticipated Tomorrow         The patient's care was discussed with the Attending Physician, Dr. Mendoza, Bedside Nurse, and Patient.    Aleida Bryan PA-C  Hospitalist Service, [facility]  Securely message with [employer] (more info)  Text page via [employer] Paging/Directory   See signed in provider for up to date coverage information    ______________________________________________________________________    Chief Complaint   Acute pain event, hypoxia    History is obtained from the patient and patient's chart    History of Present Illness   Jennifer Cervantes is a 24 year old female with a history of sickle cell anemia, cognitive delays and right upper extremity hemiparesis due to past CVA, iron overload, anxiety and depression, who was admitted to [facility] on 8/16/2024 for further evaluation and treatment of acute pain event and rule " out of PE.     Patient reports she has overall been doing well. Notes she had her infusion yesterday, which typically precipitates pain events for her. She is eager and motivated to get off oxycodone completely. Feels her pain has been managed appropriately in the ED. Adamantly denies any shortness of breath. Feeling well.       Past Medical History    Past Medical History:   Diagnosis Date    Anxiety     Bleeding disorder (H24)     Blood clotting disorder (H24)     Cerebral infarction (H) 2015    Cognitive developmental delay     low IQ. 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 side (H)     right hand contractures    Iron overload due to repeated red blood cell transfusions     Migraines     Multiple subsegmental pulmonary emboli without acute cor pulmonale (H) 02/01/2021    Oppositional defiant behavior     Presence of intrauterine contraceptive device 2/18/2020    Superficial venous thrombosis of arm, right 03/25/2021    Uncomplicated asthma        Past Surgical History   Past Surgical History:   Procedure Laterality Date    AS INSERT TUNNELED CV 2 CATH W/O PORT/PUMP      CHOLECYSTECTOMY      CV RIGHT HEART CATH MEASUREMENTS RECORDED N/A 11/18/2021    Procedure: Right Heart Cath;  Surgeon: Jackson Stauffer MD;  Location:  HEART CARDIAC CATH LAB    INSERT PORT VASCULAR ACCESS Left 4/21/2021    Procedure: INSERTION, VASCULAR ACCESS PORT (NOT SURE ON SIDE UNTIL REMOVAL);  Surgeon: Rajan More MD;  Location: [facility] OR    IR CHEST PORT PLACEMENT > 5 YRS OF AGE  4/21/2021    IR CVC NON TUNNEL LINE REMOVAL  5/6/2021    IR CVC NON TUNNEL PLACEMENT > 5 YRS  04/07/2020    IR CVC NON TUNNEL PLACEMENT > 5 YRS  4/30/2021    IR CVC NON TUNNEL PLACEMENT > 5 YRS  9/7/2022    IR PORT REMOVAL LEFT  4/21/2021    REMOVE PORT VASCULAR ACCESS Left 4/21/2021    Procedure: REMOVAL, VASCULAR ACCESS PORT LEFT;   Surgeon: Rajan More MD;  Location: [facility] OR    REPAIR TENDON ELBOW Right 10/02/2019    Procedure: Right Elbow Flexor Lengthening, Flexor Pronator Slide Of Wrist and Finger, Thumb Adductor Lengthening;  Surgeon: Anai Franco MD;  Location: [facility] OR    TONSILLECTOMY Bilateral 10/02/2019    Procedure: Bilateral Tonsillectomy;  Surgeon: Farhana Guy MD;  Location: [facility] OR    ZZC BREAST REDUCTION (INCLUDES LIPO) TIER 3 Bilateral 04/23/2019       Prior to Admission Medications   Prior to Admission Medications   Prescriptions Last Dose Informant Patient Reported? Taking?   EPINEPHrine (ANY BX GENERIC EQUIV) 0.3 MG/0.3ML injection 2-pack  Self No No   Sig: Inject 0.3 mLs (0.3 mg) into the muscle as needed for anaphylaxis   acetaminophen (TYLENOL) 325 MG tablet   No No   Sig: Take 3 tablets (975 mg) by mouth every 8 hours   albuterol (PROAIR HFA/PROVENTIL HFA/VENTOLIN HFA) 108 (90 Base) MCG/ACT inhaler 8/16/2024  No Yes   Sig: Inhale 2 puffs into the lungs every 6 hours as needed for shortness of breath or wheezing   albuterol (PROVENTIL) (2.5 MG/3ML) 0.083% neb solution Past Month  No Yes   Sig: Take 2 vials (5 mg) by nebulization every 6 hours as needed for shortness of breath or wheezing   aspirin (ASA) 81 MG chewable tablet 8/16/2024  No Yes   Sig: Take 1 tablet (81 mg) by mouth 2 times daily   budesonide-formoterol (SYMBICORT) 160-4.5 MCG/ACT Inhaler 8/16/2024  No Yes   Sig: Inhale 2 puffs twice daily plus 1-2 puffs as needed. May use up to 12 puffs per day.   deferasirox (JADENU) 360 MG tablet 8/16/2024 at 4pm  No Yes   Sig: Take 4 tablets (1,440 mg) by mouth every evening   gabapentin (NEURONTIN) 300 MG capsule 8/16/2024 at 4pm  No Yes   Sig: Take 1 capsule (300 mg) by mouth 3 times daily Take PO 1 pill in evening (300 mg) TID to start. If tolerated, increase by 300 mg in morning or lunch every few days, updating your doctor's office in time to get refills. The maximum dose is 900 mg TID.    hydroxyurea (HYDREA) 500 MG capsule 8/16/2024 at 4pm  No Yes   Sig: TAKE 6 CAPSULES (3,000 MG) BY MOUTH ONCE DAILY.   Patient taking differently: Take 3,000 mg by mouth daily   ibuprofen (ADVIL/MOTRIN) 600 MG tablet   Yes No   Sig: Take 600 mg by mouth every 6 hours as needed for moderate pain Using rarely, 2x/week at most   ibuprofen (ADVIL/MOTRIN) 800 MG tablet 8/16/2024 at 4pm  Yes Yes   Sig: Take 800 mg by mouth every 8 hours as needed for moderate pain   melatonin 5 MG tablet   No No   Sig: Take 1 tablet (5 mg) by mouth nightly as needed for sleep   methocarbamol (ROBAXIN) 750 MG tablet 8/16/2024 at 4pm  No Yes   Sig: Take 1 tablet (750 mg) by mouth 4 times daily as needed for muscle spasms (during sickle pain crises. Okay to take scheduled while in pain)   metoclopramide (REGLAN) 5 MG tablet 8/16/2024 at 4pm  No Yes   Sig: Take 1 tablet (5 mg) by mouth 3 times daily as needed (Nausea)   naloxone (NARCAN) 4 MG/0.1ML nasal spray  Self Yes No   Sig: Spray 4 mg into one nostril alternating nostrils as needed for opioid reversal every 2-3 minutes until assistance arrives   naloxone (NARCAN) 4 MG/0.1ML nasal spray   Yes No   Sig: Spray 4 mg into one nostril alternating nostrils as needed for opioid reversal every 2-3 minutes until assistance arrives   omeprazole (PRILOSEC) 20 MG DR capsule 8/15/2024  No Yes   Sig: Take 1 capsule (20 mg) by mouth daily   ondansetron (ZOFRAN ODT) 8 MG ODT tab 8/15/2024  No Yes   Sig: Take 1 tablet (8 mg) by mouth every 8 hours as needed for nausea   oxyCODONE IR (ROXICODONE) 10 MG tablet 8/16/2024 at 4pm  No Yes   Sig: Take 1 tablet (10 mg) by mouth every 4 hours as needed for severe pain or breakthrough pain Goal 4 per day. Max 6 per day.      Facility-Administered Medications: None           Physical Exam   Vital Signs: Temp: 98.2  F (36.8  C) Temp src: Oral BP: 124/75 Pulse: 97   Resp: 16 SpO2: (!) 89 % O2 Device: None (Room air)    Weight: 0 lbs 0 oz    General Appearance:  Comfortable, nontoxic appearing female seen laying in bed.  Eyes: PERRLA.  No conjunctival icterus.  HEENT: Atraumatic.  Respiratory: Breathing comfortably on room air.  Lung sounds clear to auscultation throughout.  No wheezes, rhonchi, rales.  Cardiovascular: RRR.  No murmurs, rubs, gallops.  GI: Bowel sounds present throughout.  Abdomen soft, nontender.  Lymph/Hematologic: No bruising on exposed skin.  Skin: Port in place in left upper chest without evidence of infection. No lesions or rashes noted on exposed skin.  Musculoskeletal: Moving all extremities spontaneously.  Neurologic: Cranial nerves II through XII grossly intact.  Psychiatric: Mood appropriate.      Medical Decision Making       60 MINUTES SPENT BY ME on the date of service doing chart review, history, exam, documentation & further activities per the note.      Data   Imaging results reviewed over the past 24 hrs:   Recent Results (from the past 24 hour(s))   XR Chest 2 Views    Narrative    EXAM: XR CHEST 2 VIEWS  LOCATION: [facility]  DATE: 8/16/2024    INDICATION: Bilateral arm and lower extremity pain; sickle cell disease.  COMPARISON: 6/25/2024.      Impression    IMPRESSION: No acute airspace consolidation. No pleural effusion or pneumothorax.    Cardiomediastinal silhouette is normal.    Left chest wall port catheter is present. Cholecystectomy.     Recent Labs   Lab 08/16/24  1911 08/15/24  0934   WBC 11.7* 11.1*   HGB 6.6* 6.8*   MCV 85 86    373    138   POTASSIUM 4.2 4.0   CHLORIDE 104 107   CO2 23 21*   BUN 5.9* 14.5   CR 0.54 0.58   ANIONGAP 11 10   MICAH 8.9 8.7*   GLC 98 85   ALBUMIN 4.5  --    PROTTOTAL 7.7  --    BILITOTAL 3.7*  --    ALKPHOS 58  --    ALT 41  --    AST 77*  --

## 2024-08-17 NOTE — PROGRESS NOTES
[facility]    Medicine Progress Note - Hospitalist Service, GOLD TEAM 22    Date of Admission:  8/16/2024    Assessment & Plan      Jennifer Cervantes is a 24 year old female with a history of sickle cell anemia, cognitive delays and right upper extremity hemiparesis due to past CVA, iron overload, anxiety and depression, who was admitted to [facility] on 8/16/2024 for further evaluation and treatment of acute pain event and rule out of PE.     Hypoxia  Asthma  In the ED, noted to be hypoxic to 89% and again to low 80's on recheck. CXR unremarkable. Unable to obtain CTPE to rule out PE due to contrast allergy and unable to have VQ study done this evening. Started empirically on Lovenox by ED team. Patient overall asymptomatic--denies shortness of breath, weakness, fatigue, dizziness. Given she is persistently hypoxic, will rule out viral process. Low clinical concern for bacterial etiology or asthma exacerbation.   - Continue therapeutic Lovenox for now  - VQ scan ordered.  - Continuous pulse ox  - Wean oxygen as tolerated  - Viral studies  - Continue PTA albuterol PRN and Breo Ellipta (subbed for Symbicort)    Acute Pain Event  Sickle Cell Anemia  Follows closely with [facility] Hematology as outpatient. Last seen day prior to admission. Reports she received her Jr infusion day prior to admission, which typically precipitates pain events for her thus presented to the ED for further management. In the ED, labs near baseline. Patient with hypoxia as noted above, but otherwise hemodynamically stable. Overall, feels pain responsive to current regimen.   - Consider hematology consult in AM   - Pain management   - Dilaudid 1 mg IV q2h PRN (less than assumed dosing of 1 mg every hour on care plan per patient request)   - Toradol 30 mg q6h scheduled   - Tylenol 1,000 mg q6h scheduled   - Gabapentin TID scheduled   - Methocarbamol 750 mg QID PRN  - Follow CBC with diff, retic, bilirubin  - LR  "infusion for now  - Continue PTA hydroxyurea  - continue to monitor for onset of Acute chest    Chronic abdominal pain  Chronic nausea   Ongoing, chronic issue. Does not feel worse on admission. PTA on Reglan & omeprazole.   - Continue PTA reglan & omeprazole  - Check Qtc (last checked in early July)  - Follow up with GI in November    Hemochromatosis Continue PTA Jadenu.   History of CVA Continue PTA ASA.           Diet: Combination Diet Regular Diet Adult    DVT Prophylaxis: Enoxaparin (Lovenox) SQ  Lopez Catheter: Not present  Lines: PRESENT      Port A Cath Single 04/21/21 Left Chest wall-Site Assessment: WDL      Cardiac Monitoring: None  Code Status: Full Code      Clinically Significant Risk Factors Present on Admission          # Hypocalcemia: Lowest Ca = 8 mg/dL in last 2 days, will monitor and replace as appropriate       # Drug Induced Platelet Defect: home medication list includes an antiplatelet medication      # Anemia: based on hgb <11       # Overweight: Estimated body mass index is 28.33 kg/m  as calculated from the following:    Height as of this encounter: 1.626 m (5' 4.02\").    Weight as of this encounter: 74.9 kg (165 lb 2 oz).       # Financial/Environmental Concerns:    # Asthma: noted on problem list              Disposition Plan     Medically Ready for Discharge: Anticipated in 5+ Days             Jer Isabel MD  Hospitalist Service, GOLD TEAM 22  [facility]  Securely message with [employer] (more info)  Text page via [facility] Paging/Directory   See signed in provider for up to date coverage information  ______________________________________________________________________    Interval History     Not very cooperative for a history. Denies shortness of breath    Physical Exam   Vital Signs: Temp: 97.3  F (36.3  C) Temp src: Oral BP: 115/71 Pulse: 63   Resp: 18 SpO2: 97 % O2 Device: None (Room air) Oxygen Delivery: 3 LPM  Weight: 165 lbs 1.99 " oz    Physical Exam  Constitutional:       Appearance: Normal appearance.   HENT:      Head: Normocephalic.      Mouth/Throat:      Mouth: Mucous membranes are moist.   Eyes:      Extraocular Movements: Extraocular movements intact.      Pupils: Pupils are equal, round, and reactive to light.   Cardiovascular:      Rate and Rhythm: Normal rate and regular rhythm.      Heart sounds: No murmur heard.  Pulmonary:      Effort: Pulmonary effort is normal. No respiratory distress.      Breath sounds: Normal breath sounds. No wheezing.   Abdominal:      General: Abdomen is flat. Bowel sounds are normal.      Tenderness: There is no abdominal tenderness.   Musculoskeletal:      Cervical back: Normal range of motion.   Neurological:      Mental Status: She is alert and oriented to person, place, and time.      Comments: Right hemiparesis           Medical Decision Making       50 MINUTES SPENT BY ME on the date of service doing chart review, history, exam, documentation & further activities per the note.      Data     I have personally reviewed the following data over the past 24 hrs:    11.4 (H)  \   6.5 (LL)   / 405     139 106 4.3 (L) /  91   3.7 26 0.53 \     ALT: 41 AST: 77 (H) AP: 58 TBILI: 3.3 (H)   ALB: 4.5 TOT PROTEIN: 7.7 LIPASE: N/A     Ferritin:  N/A % Retic:  23.6 (H) LDH:  N/A       Imaging results reviewed over the past 24 hrs:   Recent Results (from the past 24 hour(s))   XR Chest 2 Views    Narrative    EXAM: XR CHEST 2 VIEWS  LOCATION: [facility]  DATE: 8/16/2024    INDICATION: Bilateral arm and lower extremity pain; sickle cell disease.  COMPARISON: 6/25/2024.      Impression    IMPRESSION: No acute airspace consolidation. No pleural effusion or pneumothorax.    Cardiomediastinal silhouette is normal.    Left chest wall port catheter is present. Cholecystectomy.

## 2024-08-17 NOTE — MEDICATION SCRIBE - ADMISSION MEDICATION HISTORY
Medication Scribe Admission Medication History    Admission medication history is complete. The information provided in this note is only as accurate as the sources available at the time of the update.    Information Source(s): Patient and CareEverywhere/SureScripts via in-person    Pertinent Information: N/A    Changes made to PTA medication list:  Added: None  Deleted: deferiprone 1000mg   Changed: None    Allergies reviewed with patient and updates made in EHR: yes    Medication History Completed By: Brenda Trejo 8/16/2024 10:09 PM    PTA Med List   Medication Sig Last Dose    albuterol (PROAIR HFA/PROVENTIL HFA/VENTOLIN HFA) 108 (90 Base) MCG/ACT inhaler Inhale 2 puffs into the lungs every 6 hours as needed for shortness of breath or wheezing 8/16/2024    albuterol (PROVENTIL) (2.5 MG/3ML) 0.083% neb solution Take 2 vials (5 mg) by nebulization every 6 hours as needed for shortness of breath or wheezing Past Month    aspirin (ASA) 81 MG chewable tablet Take 1 tablet (81 mg) by mouth 2 times daily 8/16/2024    budesonide-formoterol (SYMBICORT) 160-4.5 MCG/ACT Inhaler Inhale 2 puffs twice daily plus 1-2 puffs as needed. May use up to 12 puffs per day. 8/16/2024    deferasirox (JADENU) 360 MG tablet Take 4 tablets (1,440 mg) by mouth every evening 8/16/2024 at 4pm    gabapentin (NEURONTIN) 300 MG capsule Take 1 capsule (300 mg) by mouth 3 times daily Take PO 1 pill in evening (300 mg) TID to start. If tolerated, increase by 300 mg in morning or lunch every few days, updating your doctor's office in time to get refills. The maximum dose is 900 mg TID. 8/16/2024 at 4pm    hydroxyurea (HYDREA) 500 MG capsule TAKE 6 CAPSULES (3,000 MG) BY MOUTH ONCE DAILY. (Patient taking differently: Take 3,000 mg by mouth daily) 8/16/2024 at 4pm    ibuprofen (ADVIL/MOTRIN) 800 MG tablet Take 800 mg by mouth every 8 hours as needed for moderate pain 8/16/2024 at 4pm    methocarbamol (ROBAXIN) 750 MG tablet Take 1 tablet (750 mg)  by mouth 4 times daily as needed for muscle spasms (during sickle pain crises. Okay to take scheduled while in pain) 8/16/2024 at 4pm    metoclopramide (REGLAN) 5 MG tablet Take 1 tablet (5 mg) by mouth 3 times daily as needed (Nausea) 8/16/2024 at 4pm    omeprazole (PRILOSEC) 20 MG DR capsule Take 1 capsule (20 mg) by mouth daily 8/15/2024    ondansetron (ZOFRAN ODT) 8 MG ODT tab Take 1 tablet (8 mg) by mouth every 8 hours as needed for nausea 8/15/2024    oxyCODONE IR (ROXICODONE) 10 MG tablet Take 1 tablet (10 mg) by mouth every 4 hours as needed for severe pain or breakthrough pain Goal 4 per day. Max 6 per day. 8/16/2024 at 4pm

## 2024-08-17 NOTE — CONSULTS
Care Management Initial Consult    General Information  Assessment completed with: Patient,    Type of CM/SW Visit: Initial Assessment    Primary Care Provider verified and updated as needed: Yes (Suraj Case [phone] [address] [facility] [address] [facility] [address])   Readmission within the last 30 days: other (see comments) (Sickle Cell Crisis)   Return Category: Exacerbation of disease  Reason for Consult: other (see comments) (elevated risk score)  Advance Care Planning: Advance Care Planning Reviewed: no concerns identified        Communication Assessment  Patient's communication style: spoken language (English or Bilingual)    Hearing Difficulty or Deaf: no   Wear Glasses or Blind: yes    Cognitive  Cognitive/Neuro/Behavioral: WDL                      Living Environment:   People in home: parent(s), sibling(s)  Rohini  Current living Arrangements: house      Able to return to prior arrangements: yes       Family/Social Support:  Care provided by: self, parent(s)  Provides care for: no one  Marital Status: Single  Parent(s)          Description of Support System: Supportive, Involved    Support Assessment: Adequate family and caregiver support, Adequate social supports    Current Resources:   Patient receiving home care services: No     Community Resources: None  Equipment currently used at home: none  Supplies currently used at home: None    Employment/Financial:  Employment Status: unemployed        Financial Concerns: none   Referral to Financial Worker: No     Does the patient's insurance plan have a 3 day qualifying hospital stay waiver?  No    Lifestyle & Psychosocial Needs:  Social Determinants of Health     Food Insecurity: Low Risk  (7/3/2024)    Food Insecurity     Within the past 12 months, did you worry that your food would run out before you got money to buy more?: No     Within the past 12 months, did the food you bought just not last and you didn t have money to get more?: No    Depression: Not at risk (7/3/2024)    PHQ-2     PHQ-2 Score: 1   Housing Stability: Low Risk  (7/3/2024)    Housing Stability     Do you have housing? : Yes     Are you worried about losing your housing?: No   Tobacco Use: Low Risk  (8/6/2024)    Patient History     Smoking Tobacco Use: Never     Smokeless Tobacco Use: Never     Passive Exposure: Never   Financial Resource Strain: Low Risk  (7/3/2024)    Financial Resource Strain     Within the past 12 months, have you or your family members you live with been unable to get utilities (heat, electricity) when it was really needed?: No   Alcohol Use: Not on file   Transportation Needs: Low Risk  (7/3/2024)    Transportation Needs     Within the past 12 months, has lack of transportation kept you from medical appointments, getting your medicines, non-medical meetings or appointments, work, or from getting things that you need?: No   Physical Activity: Unknown (7/3/2024)    Exercise Vital Sign     Days of Exercise per Week: 1 day     Minutes of Exercise per Session: Not on file   Interpersonal Safety: Low Risk  (7/3/2024)    Interpersonal Safety     Do you feel physically and emotionally safe where you currently live?: Yes     Within the past 12 months, have you been hit, slapped, kicked or otherwise physically hurt by someone?: No     Within the past 12 months, have you been humiliated or emotionally abused in other ways by your partner or ex-partner?: No   Stress: No Stress Concern Present (7/3/2024)    [employer] - Occupational Stress Questionnaire     Feeling of Stress : Only a little   Social Connections: Unknown (7/3/2024)    Social Connection and Isolation Panel [NHANES]     Frequency of Communication with Friends and Family: Not on file     Frequency of Social Gatherings with Friends and Family: Never     Attends Christian Services: Not on file     Active Member of Clubs or Organizations: Not on file     Attends Club or  Organization Meetings: Not on file     Marital Status: Not on file   Health Literacy: Not on file       Functional Status:  Prior to admission patient needed assistance:   Dependent ADLs:: Independent, Ambulation-no assistive device  Dependent IADLs:: Transportation  Assesssment of Functional Status: At functional baseline    Mental Health Status:  Mental Health Status: No Current Concerns       Chemical Dependency Status:  Chemical Dependency Status: No Current Concerns             Values/Beliefs:  Spiritual, Cultural Beliefs, Christianity Practices, Values that affect care: no          Values/Beliefs Comment:  (None)    Additional Information:  Met with patient at bedside to complete CMA. No plans for home care at discharge. Patient lives with her mother and siblings. Patient's mother is her PCA. Patient walks independently, but has upper right sided paralysis due to a past CVA. Plan for patient to discharge to home after sickle cell crisis. RNCC available as needed.      Mali Garcia RN, BSN  Care Coordinator, [facility]  Phone [phone]  Pager [phone]

## 2024-08-17 NOTE — CONSULTS
Note created for documentation purposes;   Full Consult note already documented 8/17/2024 9:50AM  Please refer to that note

## 2024-08-17 NOTE — PLAN OF CARE
"  Problem: Adult Inpatient Plan of Care  Goal: Plan of Care Review  Description:   VS:  Refused Vitals    O2: O2 3 Liters NC / patient asked to have O2 moved to 4 liters   Output: Continent of Bowel and Bladder    Last BM:  08/15/24   Activity:  Independent    Up for meals?  Yes   Skin:  No Issues    Pain:  Yes   CMS:  A&OX 2-3   Dressing:  None    Diet:  Regular    LDA:  Port A Cath Left   ml/hr    Equipment:  Personal Belongings    Plan:  POC   Additional Info:  Sickle Cell Crisis    Hypoxic  DuoNeb 1750 3 ml 1750    Pain management   Dilaudid 1 mg  Q2Hrs   1609  1813  Pain level 10 crying, restless, and agitated     Refused Tylenol     Outcome: Progressing  Goal: Patient-Specific Goal (Individualized)  Description: You can add care plan individualizations to a care plan. Examples of Individualization might be:  \"Parent requests to be called daily at 9am for status\", \"I have a hard time hearing out of my right ear\", or \"Do not touch me to wake me up as it startles  me\".  Outcome: Progressing  Goal: Absence of Hospital-Acquired Illness or Injury  Outcome: Progressing  Intervention: Prevent Skin Injury  Recent Flowsheet Documentation  Taken 8/17/2024 1639 by Lis Sosa, RN  Body Position:   position changed independently   turned   left  Taken 8/17/2024 1609 by Lis Sosa, RN  Body Position:   position changed independently   turned   left  Goal: Optimal Comfort and Wellbeing  Outcome: Progressing  Intervention: Monitor Pain and Promote Comfort  Recent Flowsheet Documentation  Taken 8/17/2024 1609 by Lis Sosa, RN  Pain Management Interventions: medication (see MAR)  Goal: Readiness for Transition of Care  Outcome: Progressing     Problem: Pain Acute  Goal: Optimal Pain Control and Function  Outcome: Progressing  Intervention: Develop Pain Management Plan  Recent Flowsheet Documentation  Taken 8/17/2024 1609 by Lis Sosa, RN  Pain Management Interventions: medication (see MAR)   "   Problem: Skin Injury Risk Increased  Goal: Skin Health and Integrity  Outcome: Progressing  Intervention: Plan: Nurse Driven Intervention: Moisture Management  Recent Flowsheet Documentation  Taken 8/17/2024 1609 by Lis Sosa, RN  Bathing/Skin Care: wipes, CHG  Intervention: Optimize Skin Protection  Recent Flowsheet Documentation  Taken 8/17/2024 1639 by Lis Sosa, RN  Activity Management:   activity adjusted per tolerance   activity encouraged  Taken 8/17/2024 1609 by Lis Sosa, RN  Activity Management:   activity adjusted per tolerance   activity encouraged   Goal Outcome Evaluation:

## 2024-08-18 ENCOUNTER — APPOINTMENT (OUTPATIENT)
Dept: ULTRASOUND IMAGING | Facility: CLINIC | Age: 24
DRG: 811 | End: 2024-08-18
Attending: HOSPITALIST
Payer: COMMERCIAL

## 2024-08-18 LAB
ANION GAP SERPL CALCULATED.3IONS-SCNC: 10 MMOL/L (ref 7–15)
BASOPHILS # BLD AUTO: ABNORMAL 10*3/UL
BASOPHILS # BLD MANUAL: 0.4 10E3/UL (ref 0–0.2)
BASOPHILS NFR BLD AUTO: ABNORMAL %
BASOPHILS NFR BLD MANUAL: 4 %
BILIRUB SERPL-MCNC: 2.2 MG/DL
BUN SERPL-MCNC: 3.4 MG/DL (ref 6–20)
CALCIUM SERPL-MCNC: 8.6 MG/DL (ref 8.8–10.4)
CHLORIDE SERPL-SCNC: 105 MMOL/L (ref 98–107)
CREAT SERPL-MCNC: 0.49 MG/DL (ref 0.51–0.95)
EGFRCR SERPLBLD CKD-EPI 2021: >90 ML/MIN/1.73M2
EOSINOPHIL # BLD AUTO: ABNORMAL 10*3/UL
EOSINOPHIL # BLD MANUAL: 0.4 10E3/UL (ref 0–0.7)
EOSINOPHIL NFR BLD AUTO: ABNORMAL %
EOSINOPHIL NFR BLD MANUAL: 4 %
ERYTHROCYTE [DISTWIDTH] IN BLOOD BY AUTOMATED COUNT: 26.6 % (ref 10–15)
GLUCOSE SERPL-MCNC: 85 MG/DL (ref 70–99)
HCO3 SERPL-SCNC: 27 MMOL/L (ref 22–29)
HCT VFR BLD AUTO: 17.6 % (ref 35–47)
HGB BLD-MCNC: 6.4 G/DL (ref 11.7–15.7)
IMM GRANULOCYTES # BLD: ABNORMAL 10*3/UL
IMM GRANULOCYTES NFR BLD: ABNORMAL %
LMWH PPP CHRO-ACNC: 0.85 IU/ML
LYMPHOCYTES # BLD AUTO: ABNORMAL 10*3/UL
LYMPHOCYTES # BLD MANUAL: 4 10E3/UL (ref 0.8–5.3)
LYMPHOCYTES NFR BLD AUTO: ABNORMAL %
LYMPHOCYTES NFR BLD MANUAL: 44 %
MCH RBC QN AUTO: 31.7 PG (ref 26.5–33)
MCHC RBC AUTO-ENTMCNC: 36.4 G/DL (ref 31.5–36.5)
MCV RBC AUTO: 87 FL (ref 78–100)
METAMYELOCYTES # BLD MANUAL: 0.1 10E3/UL
METAMYELOCYTES NFR BLD MANUAL: 1 %
MONOCYTES # BLD AUTO: ABNORMAL 10*3/UL
MONOCYTES # BLD MANUAL: 0.5 10E3/UL (ref 0–1.3)
MONOCYTES NFR BLD AUTO: ABNORMAL %
MONOCYTES NFR BLD MANUAL: 5 %
NEUTROPHILS # BLD AUTO: ABNORMAL 10*3/UL
NEUTROPHILS # BLD MANUAL: 3.7 10E3/UL (ref 1.6–8.3)
NEUTROPHILS NFR BLD AUTO: ABNORMAL %
NEUTROPHILS NFR BLD MANUAL: 41 %
NRBC # BLD AUTO: 1.1 10E3/UL
NRBC # BLD AUTO: 1.2 10E3/UL
NRBC BLD AUTO-RTO: 13 /100
NRBC BLD MANUAL-RTO: 13 %
NT-PROBNP SERPL-MCNC: 362 PG/ML (ref 0–450)
PLAT MORPH BLD: ABNORMAL
PLATELET # BLD AUTO: 362 10E3/UL (ref 150–450)
POLYCHROMASIA BLD QL SMEAR: SLIGHT
POTASSIUM SERPL-SCNC: 3.7 MMOL/L (ref 3.4–5.3)
PROMYELOCYTES # BLD MANUAL: 0.1 10E3/UL
PROMYELOCYTES NFR BLD MANUAL: 1 %
RBC # BLD AUTO: 2.02 10E6/UL (ref 3.8–5.2)
RBC MORPH BLD: ABNORMAL
RETICS # AUTO: 0.56 10E6/UL (ref 0.03–0.1)
RETICS/RBC NFR AUTO: 27.3 % (ref 0.5–2)
SICKLE CELLS BLD QL SMEAR: ABNORMAL
SODIUM SERPL-SCNC: 142 MMOL/L (ref 135–145)
TARGETS BLD QL SMEAR: ABNORMAL
TROPONIN T SERPL HS-MCNC: <6 NG/L
WBC # BLD AUTO: 9 10E3/UL (ref 4–11)

## 2024-08-18 PROCEDURE — 250N000011 HC RX IP 250 OP 636: Performed by: HOSPITALIST

## 2024-08-18 PROCEDURE — 84484 ASSAY OF TROPONIN QUANT: CPT | Performed by: HOSPITALIST

## 2024-08-18 PROCEDURE — 93970 EXTREMITY STUDY: CPT | Mod: 26 | Performed by: RADIOLOGY

## 2024-08-18 PROCEDURE — 99232 SBSQ HOSP IP/OBS MODERATE 35: CPT | Performed by: INTERNAL MEDICINE

## 2024-08-18 PROCEDURE — 94640 AIRWAY INHALATION TREATMENT: CPT | Mod: 76

## 2024-08-18 PROCEDURE — 94640 AIRWAY INHALATION TREATMENT: CPT

## 2024-08-18 PROCEDURE — 85014 HEMATOCRIT: CPT | Performed by: HOSPITALIST

## 2024-08-18 PROCEDURE — 120N000002 HC R&B MED SURG/OB UMMC

## 2024-08-18 PROCEDURE — 250N000011 HC RX IP 250 OP 636: Performed by: PHYSICIAN ASSISTANT

## 2024-08-18 PROCEDURE — 250N000011 HC RX IP 250 OP 636: Performed by: INTERNAL MEDICINE

## 2024-08-18 PROCEDURE — 93970 EXTREMITY STUDY: CPT

## 2024-08-18 PROCEDURE — 36591 DRAW BLOOD OFF VENOUS DEVICE: CPT | Performed by: HOSPITALIST

## 2024-08-18 PROCEDURE — 85520 HEPARIN ASSAY: CPT | Performed by: HOSPITALIST

## 2024-08-18 PROCEDURE — 250N000013 HC RX MED GY IP 250 OP 250 PS 637

## 2024-08-18 PROCEDURE — 82374 ASSAY BLOOD CARBON DIOXIDE: CPT | Performed by: HOSPITALIST

## 2024-08-18 PROCEDURE — 999N000157 HC STATISTIC RCP TIME EA 10 MIN

## 2024-08-18 PROCEDURE — 83880 ASSAY OF NATRIURETIC PEPTIDE: CPT | Performed by: HOSPITALIST

## 2024-08-18 PROCEDURE — 85007 BL SMEAR W/DIFF WBC COUNT: CPT | Performed by: HOSPITALIST

## 2024-08-18 PROCEDURE — 99233 SBSQ HOSP IP/OBS HIGH 50: CPT | Performed by: HOSPITALIST

## 2024-08-18 PROCEDURE — 93010 ELECTROCARDIOGRAM REPORT: CPT | Performed by: INTERNAL MEDICINE

## 2024-08-18 PROCEDURE — 258N000003 HC RX IP 258 OP 636: Performed by: HOSPITALIST

## 2024-08-18 PROCEDURE — 250N000013 HC RX MED GY IP 250 OP 250 PS 637: Performed by: HOSPITALIST

## 2024-08-18 PROCEDURE — 85045 AUTOMATED RETICULOCYTE COUNT: CPT | Performed by: HOSPITALIST

## 2024-08-18 PROCEDURE — 250N000009 HC RX 250

## 2024-08-18 PROCEDURE — 82247 BILIRUBIN TOTAL: CPT | Performed by: HOSPITALIST

## 2024-08-18 RX ORDER — ALBUTEROL SULFATE 90 UG/1
2 AEROSOL, METERED RESPIRATORY (INHALATION) EVERY 6 HOURS PRN
Status: DISCONTINUED | OUTPATIENT
Start: 2024-08-18 | End: 2024-08-21 | Stop reason: HOSPADM

## 2024-08-18 RX ORDER — HEPARIN SODIUM,PORCINE 10 UNIT/ML
5-10 VIAL (ML) INTRAVENOUS
Status: DISCONTINUED | OUTPATIENT
Start: 2024-08-18 | End: 2024-08-21 | Stop reason: HOSPADM

## 2024-08-18 RX ORDER — METOCLOPRAMIDE HYDROCHLORIDE 5 MG/ML
5 INJECTION INTRAMUSCULAR; INTRAVENOUS ONCE
Status: COMPLETED | OUTPATIENT
Start: 2024-08-18 | End: 2024-08-18

## 2024-08-18 RX ORDER — HEPARIN SODIUM,PORCINE 10 UNIT/ML
5-10 VIAL (ML) INTRAVENOUS EVERY 24 HOURS
Status: DISCONTINUED | OUTPATIENT
Start: 2024-08-18 | End: 2024-08-21 | Stop reason: HOSPADM

## 2024-08-18 RX ORDER — HEPARIN SODIUM (PORCINE) LOCK FLUSH IV SOLN 100 UNIT/ML 100 UNIT/ML
5-10 SOLUTION INTRAVENOUS
Status: DISCONTINUED | OUTPATIENT
Start: 2024-08-18 | End: 2024-08-21 | Stop reason: HOSPADM

## 2024-08-18 RX ORDER — IBUPROFEN 400 MG/1
400 TABLET, FILM COATED ORAL ONCE
Status: COMPLETED | OUTPATIENT
Start: 2024-08-18 | End: 2024-08-18

## 2024-08-18 RX ADMIN — GABAPENTIN 300 MG: 300 CAPSULE ORAL at 20:38

## 2024-08-18 RX ADMIN — IPRATROPIUM BROMIDE AND ALBUTEROL SULFATE 3 ML: .5; 3 SOLUTION RESPIRATORY (INHALATION) at 19:53

## 2024-08-18 RX ADMIN — HYDROMORPHONE HYDROCHLORIDE 1 MG: 1 INJECTION, SOLUTION INTRAMUSCULAR; INTRAVENOUS; SUBCUTANEOUS at 14:13

## 2024-08-18 RX ADMIN — IBUPROFEN 400 MG: 400 TABLET, FILM COATED ORAL at 16:21

## 2024-08-18 RX ADMIN — HYDROMORPHONE HYDROCHLORIDE 1 MG: 1 INJECTION, SOLUTION INTRAMUSCULAR; INTRAVENOUS; SUBCUTANEOUS at 05:33

## 2024-08-18 RX ADMIN — HYDROMORPHONE HYDROCHLORIDE 1 MG: 1 INJECTION, SOLUTION INTRAMUSCULAR; INTRAVENOUS; SUBCUTANEOUS at 02:33

## 2024-08-18 RX ADMIN — IPRATROPIUM BROMIDE AND ALBUTEROL SULFATE 3 ML: .5; 3 SOLUTION RESPIRATORY (INHALATION) at 09:29

## 2024-08-18 RX ADMIN — GABAPENTIN 300 MG: 300 CAPSULE ORAL at 09:04

## 2024-08-18 RX ADMIN — METOCLOPRAMIDE HYDROCHLORIDE 5 MG: 5 INJECTION INTRAMUSCULAR; INTRAVENOUS at 06:43

## 2024-08-18 RX ADMIN — ACETAMINOPHEN 975 MG: 325 TABLET ORAL at 11:39

## 2024-08-18 RX ADMIN — ASPIRIN 81 MG CHEWABLE TABLET 81 MG: 81 TABLET CHEWABLE at 20:38

## 2024-08-18 RX ADMIN — HYDROMORPHONE HYDROCHLORIDE 1 MG: 1 INJECTION, SOLUTION INTRAMUSCULAR; INTRAVENOUS; SUBCUTANEOUS at 20:39

## 2024-08-18 RX ADMIN — HYDROMORPHONE HYDROCHLORIDE 1 MG: 1 INJECTION, SOLUTION INTRAMUSCULAR; INTRAVENOUS; SUBCUTANEOUS at 00:31

## 2024-08-18 RX ADMIN — GABAPENTIN 300 MG: 300 CAPSULE ORAL at 14:12

## 2024-08-18 RX ADMIN — Medication 5 ML: at 22:49

## 2024-08-18 RX ADMIN — DEFERASIROX 1440 MG: 360 TABLET, FILM COATED ORAL at 20:38

## 2024-08-18 RX ADMIN — ASPIRIN 81 MG CHEWABLE TABLET 81 MG: 81 TABLET CHEWABLE at 09:04

## 2024-08-18 RX ADMIN — HYDROXYUREA 3000 MG: 500 CAPSULE ORAL at 09:04

## 2024-08-18 RX ADMIN — ENOXAPARIN SODIUM 70 MG: 80 INJECTION SUBCUTANEOUS at 20:42

## 2024-08-18 RX ADMIN — HYDROMORPHONE HYDROCHLORIDE 1 MG: 1 INJECTION, SOLUTION INTRAMUSCULAR; INTRAVENOUS; SUBCUTANEOUS at 16:20

## 2024-08-18 RX ADMIN — HYDROMORPHONE HYDROCHLORIDE 1 MG: 1 INJECTION, SOLUTION INTRAMUSCULAR; INTRAVENOUS; SUBCUTANEOUS at 11:40

## 2024-08-18 RX ADMIN — HYDROMORPHONE HYDROCHLORIDE 1 MG: 1 INJECTION, SOLUTION INTRAMUSCULAR; INTRAVENOUS; SUBCUTANEOUS at 09:05

## 2024-08-18 RX ADMIN — HYDROMORPHONE HYDROCHLORIDE 1 MG: 1 INJECTION, SOLUTION INTRAMUSCULAR; INTRAVENOUS; SUBCUTANEOUS at 18:24

## 2024-08-18 RX ADMIN — SODIUM CHLORIDE, POTASSIUM CHLORIDE, SODIUM LACTATE AND CALCIUM CHLORIDE: 600; 310; 30; 20 INJECTION, SOLUTION INTRAVENOUS at 08:14

## 2024-08-18 RX ADMIN — HYDROMORPHONE HYDROCHLORIDE 1 MG: 1 INJECTION, SOLUTION INTRAMUSCULAR; INTRAVENOUS; SUBCUTANEOUS at 22:49

## 2024-08-18 RX ADMIN — OMEPRAZOLE 20 MG: 20 CAPSULE, DELAYED RELEASE ORAL at 09:04

## 2024-08-18 RX ADMIN — ENOXAPARIN SODIUM 70 MG: 80 INJECTION SUBCUTANEOUS at 09:05

## 2024-08-18 ASSESSMENT — ACTIVITIES OF DAILY LIVING (ADL)
ADLS_ACUITY_SCORE: 22

## 2024-08-18 NOTE — PHARMACY-ANTICOAGULATION SERVICE
Clinical Pharmacy Note- Enoxaparin Anti-Xa Evaluation for ADULT Patients    Date of Service 2024  Patient's  2000   Patient's age:  24 year old  Patient's Weight used for enoxaparin dosin.9 kg (165 lb 2 oz)  Patient's BMI: Body mass index is 28.33 kg/m .   Enoxaparin Indication: DVT/PE treatment  Goal LMWH anti-Xa level for ADULT patients: 0.5-1 IU/mL (1 mg/kg or 0.75 mg/kg BID dose)  Current Enoxaparin Regimen: 1 mg/kg subcutaneous Q 12 hours     Creatinine for last 3 days:   Creatinine   Date Value Ref Range Status   2024 0.49 (L) 0.51 - 0.95 mg/dL Final   2024 0.53 0.51 - 0.95 mg/dL Final   2024 0.54 0.51 - 0.95 mg/dL Final   07/10/2021 0.50 (L) 0.52 - 1.04 mg/dL Final   2021 0.59 0.52 - 1.04 mg/dL Final   2021 0.53 0.52 - 1.04 mg/dL Final      Current estimated CrCL:  Serum creatinine: 0.49 mg/dL (L) 24 0606  Estimated creatinine clearance: 174 mL/min (A)     Platelet count for last 3 days:   Platelet Count   Date Value Ref Range Status   2024 362 150 - 450 10e3/uL Final   2024 405 150 - 450 10e3/uL Final   2024 384 150 - 450 10e3/uL Final   07/10/2021 290 150 - 450 10e9/L Final   2021 298 150 - 450 10e9/L Final   2021 279 150 - 450 10e9/L Final      Recent Enoxaparin anti-Xa Levels (past 3 days):   Recent Labs     24  1314   ALMWH 0.85   ]    ASSESSMENT OF LEVELS AND CURRENT REGIMEN:   1) Enoxaparin anti-XA level was drawn ~4 hours post 4th dose at steady state.    2) Current LMWH anti-Xa peak level is: AT GOAL    3) Renal Function:  Scr changed > 50% in last 72 hours? No  Urine Output: Unable to determine    4) Platelet Counts have been assessed and are: Stable    RECOMMENDATIONS:      Recommended Enoxaparin Anti-Xa Dose Adjustments  VTE Treatment (1 mg/kg or 0.75 mg/kg SQ Q12h dosing)   4h peak Anti-Xa (IU/ml) Hold next dose? Dose change Repeat Anti-Xa levels   <0.35 No INCREASE   25% 4 hours after the dose once at  steady state   0.35-0.49 No INCREASE    10-15%    0.5-1 No No change Repeat only if necessary   1.01-1.59 No DECREASE   20% 4 hours after the dose once at steady state   1.6-2 x 3 h DECREASE   30%    >2 All further doses should be held and anti-Xa levels   measured every 12 hours until it is <0.5 units/ml.    Decrease previous dose by 40% when restarted.   Non-Standard Treatment (1.5 mg/kg SQ Q24h) AND VTE Prophylaxis Dosing   If LMWH anti-Xa levels are outside goal range, adjust dose up/down proportionally by percentage as pharmacokinetics are linear     1) Enoxaparin Regimen/Dose Plan: NO change  2) Recheck Enoxaparin anti-Xa levels: No recheck necessary at this time.    The pharmacist will continue to monitor and follow enoxaparin LMWH anti-Xa levels as needed.      Please contact pharmacy if enoxaparin needs to be held for a procedure or if goal levels change.    CORTES FIELDS CHRISTOPHER, Pharm.D

## 2024-08-18 NOTE — PROGRESS NOTES
[facility]    Medicine Progress Note - Hospitalist Service, GOLD TEAM 22    Date of Admission:  8/16/2024    Assessment & Plan      Jennifer Cervantes is a 24 year old female with a history of sickle cell anemia, cognitive delays and right upper extremity hemiparesis due to past CVA, iron overload, anxiety and depression, who was admitted to [facility] on 8/16/2024 for further evaluation and treatment of acute pain event and rule out of PE.     Acute hypoxic respiratory failure  Asthma  Hx of acute pulmonary embolism  In the ED, noted to be hypoxic to 89% and again to low 80's on recheck. CXR unremarkable. Unable to obtain CTPE to rule out PE due to contrast allergy. Started empirically on Lovenox by ED team. Patient overall asymptomatic--denies shortness of breath, weakness, fatigue, dizziness. Given she is persistently hypoxic, will rule out viral process. Low clinical concern for bacterial etiology or asthma exacerbation.   - Continue therapeutic Lovenox for now  - VQ scan ordered. Discussed about premedications prior to CT chest to reduce the risk of allergic reaction; discussed about sensitivity/specificity of V/Q scan which can be inferior to CT chest. Patient still is not interested in pursuing CT chest with premedications and would like to pursue with V/Q scan. Patient does not does clearly rule out recurrent VTEs. Will also check LE US Doppler for pretest probability. Will also check high sensitivity Troponin T and NT-Pro NT-Pro BNP. No criteria for acute chest, pneumonia but will monitor   - Continuous pulse ox  - Wean oxygen as tolerated  - Viral studies  - Continue PTA albuterol PRN and Breo Ellipta (subbed for Symbicort)    Acute Pain Event  Sickle Cell Anemia  Follows closely with [facility] Hematology as outpatient. Last seen day prior to admission. Reports she received her Jr infusion day prior to admission, which typically precipitates pain events for her thus  "presented to the ED for further management. In the ED, labs near baseline. Patient with hypoxia as noted above, but otherwise hemodynamically stable. Overall, feels pain responsive to current regimen.   - Pain management   - Dilaudid 1 mg IV q2h PRN (less than assumed dosing of 1 mg every hour on care plan per patient request), - Tylenol 1,000 mg q6h scheduled,  Gabapentin TID scheduled,  Methocarbamol 750 mg QID PRN. Stopped Toradol in view of AC use   - Follow CBC with diff, retic, bilirubin  - S/P LR infusion  - Continue PTA hydroxyurea; incentive spirometry use  - Hematology consulted and appreciate recommendations    Chronic abdominal pain  Chronic nausea   Ongoing, chronic issue. Does not feel worse on admission. PTA on Reglan & omeprazole.   - Continue PTA reglan & omeprazole  - Check Qtc (last checked in early July)  - Follow up with GI in November    Hemochromatosis Continue PTA Jadenu.   History of CVA Continue PTA ASA.         Diet: Combination Diet Regular Diet Adult    DVT Prophylaxis: Enoxaparin (Lovenox) SQ  Lopez Catheter: Not present  Lines: PRESENT      Port A Cath Single 04/21/21 Left Chest wall-Site Assessment: WDL      Cardiac Monitoring: None  Code Status: Full Code      Clinically Significant Risk Factors          # Hypocalcemia: Lowest Ca = 8 mg/dL in last 2 days, will monitor and replace as appropriate                     # Overweight: Estimated body mass index is 28.33 kg/m  as calculated from the following:    Height as of this encounter: 1.626 m (5' 4.02\").    Weight as of this encounter: 74.9 kg (165 lb 2 oz)., PRESENT ON ADMISSION       # Financial/Environmental Concerns: none  # Asthma: noted on problem list              Disposition Plan     Medically Ready for Discharge: Anticipated in 5+ Days         Jer Isabel MD  Hospitalist Service, GOLD TEAM 22  [facility]  Securely message with [employer] (more info)  Text page via [employer] " Paging/Directory   See signed in provider for up to date coverage information  ______________________________________________________________________    Interval History     More alert and interactive. Reports controlled pain. Denies wheezing, coughing or shortness of breath. Compliant with incentive spirometry use    Physical Exam   Vital Signs: Temp: 97.6  F (36.4  C) Temp src: Oral BP: 112/71 Pulse: 71   Resp: 16 SpO2: 95 % O2 Device: Oxymask Oxygen Delivery: 4 LPM  Weight: 165 lbs 1.99 oz    Physical Exam  Constitutional:       Appearance: Normal appearance.   HENT:      Head: Normocephalic.      Mouth/Throat:      Mouth: Mucous membranes are moist.   Eyes:      Extraocular Movements: Extraocular movements intact.      Pupils: Pupils are equal, round, and reactive to light.   Cardiovascular:      Rate and Rhythm: Normal rate and regular rhythm.      Heart sounds: No murmur heard.  Pulmonary:      Effort: Pulmonary effort is normal. No respiratory distress.      Breath sounds: Normal breath sounds. No wheezing.   Abdominal:      General: Abdomen is flat. Bowel sounds are normal.      Tenderness: There is no abdominal tenderness.   Musculoskeletal:      Cervical back: Normal range of motion.   Neurological:      Mental Status: She is alert and oriented to person, place, and time.      Comments: Right hemiparesis           Medical Decision Making       50 MINUTES SPENT BY ME on the date of service doing chart review, history, exam, documentation & further activities per the note.      Data     I have personally reviewed the following data over the past 24 hrs:    9.0  \   6.4 (LL)   / 362     142 105 3.4 (L) /  85   3.7 27 0.49 (L) \     ALT: N/A AST: N/A AP: N/A TBILI: 2.2 (H)   ALB: N/A TOT PROTEIN: N/A LIPASE: N/A     Ferritin:  N/A % Retic:  27.3 (H) LDH:  N/A       Imaging results reviewed over the past 24 hrs:   No results found for this or any previous visit (from the past 24 hour(s)).

## 2024-08-18 NOTE — PLAN OF CARE
"  Problem: Adult Inpatient Plan of Care  Goal: Plan of Care Review  Description:   VS:  /68 (BP Location: Left arm)   Pulse 77   Temp 97.9  F (36.6  C) (Oral)   Resp 16   Ht 1.626 m (5' 4.02\")   Wt 74.9 kg (165 lb 2 oz)   LMP  (LMP Unknown)   SpO2 99%   BMI 28.33 kg/m      O2:  4 Liters O2 Oximyzer Mask   Output:  Continent of Bowel and Bladder    Last BM:  08/17/24   Activity:  Independent   Up for meals?  Yes    Skin:  No Issues    Pain:  Yes    CMS:  A&OX 3-4   Dressing:  None    Diet:  Regular    LDA:  Port A Cath Left     Equipment:  Personal Belongings    Plan:  POC   Additional Info:  Sickle Cell Crisis / Pain Management     Patient is rude, agitated, anxious, restless, and uncooperative during shift    Hypoxic  DuoNeb  08/18/ 0929     Pain management   Dilaudid 1 mg  Q2Hrs   1630   1X  1828    1X    Patient refused to have Oxygen turned down to 2 Liters or to be weaned to lower dose   Pain level 7 restless, and agitated      Refused Tylenol   Received Ibuprofen 1X dose     Potassium  3.7     Outcome: Progressing  Goal: Patient-Specific Goal (Individualized)  Description: You can add care plan individualizations to a care plan. Examples of Individualization might be:  \"Parent requests to be called daily at 9am for status\", \"I have a hard time hearing out of my right ear\", or \"Do not touch me to wake me up as it startles  me\".  Outcome: Progressing  Goal: Absence of Hospital-Acquired Illness or Injury  Outcome: Progressing  Intervention: Identify and Manage Fall Risk  Recent Flowsheet Documentation  Taken 8/18/2024 1632 by Lis Sosa, RN  Safety Promotion/Fall Prevention:   activity supervised   assistive device/personal items within reach   safety round/check completed  Goal: Optimal Comfort and Wellbeing  Outcome: Progressing  Goal: Readiness for Transition of Care  Outcome: Progressing     Problem: Pain Acute  Goal: Optimal Pain Control and Function  Outcome: Progressing     Problem: " Skin Injury Risk Increased  Goal: Skin Health and Integrity  Outcome: Progressing  Intervention: Plan: Nurse Driven Intervention: Moisture Management  Recent Flowsheet Documentation  Taken 8/18/2024 1632 by Lis Sosa, RN  Bathing/Skin Care: wipes, CHG   Goal Outcome Evaluation:

## 2024-08-18 NOTE — PROGRESS NOTES
Hematology Consult Progress Note    I reviewed the patient's history and pertinent medical records on 8/18/2024.  I met with the patient and discussed my impression and recommendations.      EVENTS/SUBJECTIVE  She is now on 5L supplemental oxygen (she was receiving a nebulizer treatment when I met with her).  She states her breathing does not subjectively feel different at this time.  She does wonder whether her anemia could be contributing to her hypoxia as she sometimes feels this is the case.  Per report from her team she was hesitant about the VQ scan yesterday but when I met with her this morning she said she would be willing to have it done today.    LABORATORY   Review of pertinent results and my independent interpretation as follows:  --Hemoglobin is 6.4 and although hard to tell her baseline it appears to be around 7 and she did tell me that she generally feels better when she is above 7  --Normal creatinine today 0.49  --Total bilirubin 2.2 (slightly decreased)    RECOMMENDATIONS  Hypoxia  --Reasonable to obtain V/Q scan (or contrast CTA with premedication) for rule out of pulmonary embolism and to continue empiric Lovenox (treatment dose) until pulmonary embolism can be ruled in/out  --Could consider repeat TTE (last done 4/2024)  --Continue nebulizers and inhaler; if workup for PE negative note she has received prednisone in the past for ?asthma exacerbation  --If no clear etiology for her hypoxia otherwise and no evidence PE (or acute chest) on further imaging could consider transfusion although keeping in mind risk of iron overload, antibodies, etc  --Wean oxygen as tolerated  Acute Vaso-Occlusive Pain Episode  --Continue aggressive pain control per pain plan/primary team  --Continue standing Tylenol and Toradol (can be standing)   --Aggressive bowel regimen  --Please avoid IV benadryl  Acute Chest Syndrome (ACS) Prevention  --Monitor oxygenation and respiratory rate carefully  --Encourage incentive  Recommended artificial tears to use as directed. spirometry and ambulation  --Monitor for fluid overload  --Please page Hematology if patient develops fever, acute shortness of breath, or other new symptoms concerning for acute chest syndrome including if any evidence of infiltrates on chest imaging to be done today  General Sickle Cell Management  --Daily CBC with differential, bilirubin, retic count for now  --Continue folic acid and Hydrea   Iron Overload  --Continue Cyrusu    Estefany Thibodeaux MD  Date of Service: 8/18/2024

## 2024-08-18 NOTE — PLAN OF CARE
"  VS: /71 (BP Location: Left arm)   Pulse 71   Temp 97.6  F (36.4  C) (Oral)   Resp 16   Ht 1.626 m (5' 4.02\")   Wt 74.9 kg (165 lb 2 oz)   LMP  (LMP Unknown)   SpO2 95%   BMI 28.33 kg/m     O2: O2 @ 3L using oximizer mask   Output: Continent of bowel and bladder   Last BM: 8/15/24   Activity: Up ad janett   Skin: intact   Pain: Managed with medications and positioning   CMS: intact   Dressing: na   Diet: Regular, poor intake   LDA: L chest port   Equipment: IV pump, call light, personal items   Plan: Continue POC, NM imaging of chest   Additional Info: Patient is alert and oriented. Able to make needs known. Requests PRN medication thru the day. Duoneb given this AM for wheezing. Nebulizer effective in reducing wheezing. Patient is refusing inhaler, states that it does not work as well as the nebulizer.  Requested Ibuprofen from MD for complaints of headache. One time dose ordered. Resting comfortably in bed. Call light in reach.                             "

## 2024-08-18 NOTE — PLAN OF CARE
Problem: Skin Injury Risk Increased  Goal: Skin Health and Integrity  Outcome: Progressing  Intervention: Plan: Nurse Driven Intervention: Moisture Management    Problem: Adult Inpatient Plan of Care  Goal: Optimal Comfort and Wellbeing  Outcome: Progressing     Goal Outcome Evaluation:    A/O x4. Able to make needs known  4 L O2 via oxygen mask at pt's request  Left lio cath running continuous LR @ 100 mL/hr  Regular diet  Continent B/B. LBM 8/15  Pain managed with IV dilaudid q2h  Contact precautions maintained  Patient became nauseous around 6am and vomited. Pt requested IV medication for nauseous, provider was contacted and put in one time dose of IV Reglan  Call light within reach, will continue to monitor and follow POC

## 2024-08-19 ENCOUNTER — APPOINTMENT (OUTPATIENT)
Dept: CARDIOLOGY | Facility: CLINIC | Age: 24
DRG: 811 | End: 2024-08-19
Attending: HOSPITALIST
Payer: COMMERCIAL

## 2024-08-19 ENCOUNTER — APPOINTMENT (OUTPATIENT)
Dept: NUCLEAR MEDICINE | Facility: CLINIC | Age: 24
DRG: 811 | End: 2024-08-19
Attending: HOSPITALIST
Payer: COMMERCIAL

## 2024-08-19 ENCOUNTER — APPOINTMENT (OUTPATIENT)
Dept: GENERAL RADIOLOGY | Facility: CLINIC | Age: 24
DRG: 811 | End: 2024-08-19
Attending: HOSPITALIST
Payer: COMMERCIAL

## 2024-08-19 PROBLEM — R09.02 HYPOXIA: Status: RESOLVED | Noted: 2024-08-16 | Resolved: 2024-08-19

## 2024-08-19 PROBLEM — D57.00 SICKLE CELL DISEASE WITH CRISIS (H): Status: RESOLVED | Noted: 2024-04-23 | Resolved: 2024-08-19

## 2024-08-19 LAB
ANION GAP SERPL CALCULATED.3IONS-SCNC: 11 MMOL/L (ref 7–15)
ATRIAL RATE - MUSE: 80 BPM
BASOPHILS # BLD AUTO: ABNORMAL 10*3/UL
BASOPHILS # BLD MANUAL: 0.4 10E3/UL (ref 0–0.2)
BASOPHILS NFR BLD AUTO: ABNORMAL %
BASOPHILS NFR BLD MANUAL: 3 %
BILIRUB SERPL-MCNC: 3.1 MG/DL
BUN SERPL-MCNC: 3.9 MG/DL (ref 6–20)
CALCIUM SERPL-MCNC: 9 MG/DL (ref 8.8–10.4)
CHLORIDE SERPL-SCNC: 101 MMOL/L (ref 98–107)
CREAT SERPL-MCNC: 0.49 MG/DL (ref 0.51–0.95)
DIASTOLIC BLOOD PRESSURE - MUSE: NORMAL MMHG
EGFRCR SERPLBLD CKD-EPI 2021: >90 ML/MIN/1.73M2
EOSINOPHIL # BLD AUTO: ABNORMAL 10*3/UL
EOSINOPHIL # BLD MANUAL: 0.6 10E3/UL (ref 0–0.7)
EOSINOPHIL NFR BLD AUTO: ABNORMAL %
EOSINOPHIL NFR BLD MANUAL: 4 %
ERYTHROCYTE [DISTWIDTH] IN BLOOD BY AUTOMATED COUNT: 26.1 % (ref 10–15)
GLUCOSE SERPL-MCNC: 80 MG/DL (ref 70–99)
HCO3 SERPL-SCNC: 27 MMOL/L (ref 22–29)
HCT VFR BLD AUTO: 18.9 % (ref 35–47)
HGB BLD-MCNC: 6.8 G/DL (ref 11.7–15.7)
IMM GRANULOCYTES # BLD: ABNORMAL 10*3/UL
IMM GRANULOCYTES NFR BLD: ABNORMAL %
INTERPRETATION ECG - MUSE: NORMAL
LVEF ECHO: NORMAL
LYMPHOCYTES # BLD AUTO: ABNORMAL 10*3/UL
LYMPHOCYTES # BLD MANUAL: 1.4 10E3/UL (ref 0.8–5.3)
LYMPHOCYTES NFR BLD AUTO: ABNORMAL %
LYMPHOCYTES NFR BLD MANUAL: 10 %
MCH RBC QN AUTO: 31.3 PG (ref 26.5–33)
MCHC RBC AUTO-ENTMCNC: 36 G/DL (ref 31.5–36.5)
MCV RBC AUTO: 87 FL (ref 78–100)
MONOCYTES # BLD AUTO: ABNORMAL 10*3/UL
MONOCYTES # BLD MANUAL: 0.3 10E3/UL (ref 0–1.3)
MONOCYTES NFR BLD AUTO: ABNORMAL %
MONOCYTES NFR BLD MANUAL: 2 %
NEUTROPHILS # BLD AUTO: ABNORMAL 10*3/UL
NEUTROPHILS # BLD MANUAL: 11.2 10E3/UL (ref 1.6–8.3)
NEUTROPHILS NFR BLD AUTO: ABNORMAL %
NEUTROPHILS NFR BLD MANUAL: 81 %
NRBC # BLD AUTO: 0.9 10E3/UL
NRBC # BLD AUTO: 1.2 10E3/UL
NRBC BLD AUTO-RTO: 6 /100
NRBC BLD MANUAL-RTO: 9 %
P AXIS - MUSE: 70 DEGREES
PLAT MORPH BLD: ABNORMAL
PLATELET # BLD AUTO: 400 10E3/UL (ref 150–450)
POLYCHROMASIA BLD QL SMEAR: SLIGHT
POTASSIUM SERPL-SCNC: 3.8 MMOL/L (ref 3.4–5.3)
PR INTERVAL - MUSE: 176 MS
QRS DURATION - MUSE: 64 MS
QT - MUSE: 410 MS
QTC - MUSE: 472 MS
R AXIS - MUSE: 51 DEGREES
RBC # BLD AUTO: 2.17 10E6/UL (ref 3.8–5.2)
RBC MORPH BLD: ABNORMAL
RETICS # AUTO: 0.19 10E6/UL (ref 0.03–0.1)
RETICS/RBC NFR AUTO: 18 % (ref 0.5–2)
SICKLE CELLS BLD QL SMEAR: SLIGHT
SODIUM SERPL-SCNC: 139 MMOL/L (ref 135–145)
STOMATOCYTES BLD QL SMEAR: SLIGHT
SYSTOLIC BLOOD PRESSURE - MUSE: NORMAL MMHG
T AXIS - MUSE: 7 DEGREES
TARGETS BLD QL SMEAR: SLIGHT
VENTRICULAR RATE- MUSE: 80 BPM
WBC # BLD AUTO: 13.8 10E3/UL (ref 4–11)

## 2024-08-19 PROCEDURE — 93325 DOPPLER ECHO COLOR FLOW MAPG: CPT

## 2024-08-19 PROCEDURE — A9540 TC99M MAA: HCPCS | Performed by: HOSPITALIST

## 2024-08-19 PROCEDURE — 250N000013 HC RX MED GY IP 250 OP 250 PS 637

## 2024-08-19 PROCEDURE — 272N000035 NM LUNG SCAN VENTILATION AND PERFUSION

## 2024-08-19 PROCEDURE — 250N000011 HC RX IP 250 OP 636: Performed by: PHYSICIAN ASSISTANT

## 2024-08-19 PROCEDURE — A9567 TECHNETIUM TC-99M AEROSOL: HCPCS | Performed by: HOSPITALIST

## 2024-08-19 PROCEDURE — 250N000011 HC RX IP 250 OP 636: Performed by: HOSPITALIST

## 2024-08-19 PROCEDURE — 93325 DOPPLER ECHO COLOR FLOW MAPG: CPT | Mod: 26 | Performed by: STUDENT IN AN ORGANIZED HEALTH CARE EDUCATION/TRAINING PROGRAM

## 2024-08-19 PROCEDURE — 343N000001 HC RX 343: Performed by: HOSPITALIST

## 2024-08-19 PROCEDURE — 71046 X-RAY EXAM CHEST 2 VIEWS: CPT

## 2024-08-19 PROCEDURE — 78582 LUNG VENTILAT&PERFUS IMAGING: CPT | Mod: 26 | Performed by: RADIOLOGY

## 2024-08-19 PROCEDURE — 85027 COMPLETE CBC AUTOMATED: CPT | Performed by: HOSPITALIST

## 2024-08-19 PROCEDURE — 78582 LUNG VENTILAT&PERFUS IMAGING: CPT

## 2024-08-19 PROCEDURE — 99233 SBSQ HOSP IP/OBS HIGH 50: CPT | Performed by: HOSPITALIST

## 2024-08-19 PROCEDURE — 80048 BASIC METABOLIC PNL TOTAL CA: CPT | Performed by: HOSPITALIST

## 2024-08-19 PROCEDURE — 71046 X-RAY EXAM CHEST 2 VIEWS: CPT | Mod: 26 | Performed by: RADIOLOGY

## 2024-08-19 PROCEDURE — 85045 AUTOMATED RETICULOCYTE COUNT: CPT | Performed by: HOSPITALIST

## 2024-08-19 PROCEDURE — 82247 BILIRUBIN TOTAL: CPT | Performed by: HOSPITALIST

## 2024-08-19 PROCEDURE — 36591 DRAW BLOOD OFF VENOUS DEVICE: CPT | Performed by: HOSPITALIST

## 2024-08-19 PROCEDURE — 93308 TTE F-UP OR LMTD: CPT | Mod: 26 | Performed by: STUDENT IN AN ORGANIZED HEALTH CARE EDUCATION/TRAINING PROGRAM

## 2024-08-19 PROCEDURE — 120N000002 HC R&B MED SURG/OB UMMC

## 2024-08-19 PROCEDURE — 93321 DOPPLER ECHO F-UP/LMTD STD: CPT | Mod: 26 | Performed by: STUDENT IN AN ORGANIZED HEALTH CARE EDUCATION/TRAINING PROGRAM

## 2024-08-19 PROCEDURE — 85007 BL SMEAR W/DIFF WBC COUNT: CPT | Performed by: HOSPITALIST

## 2024-08-19 RX ORDER — ENOXAPARIN SODIUM 100 MG/ML
40 INJECTION SUBCUTANEOUS EVERY 24 HOURS
Status: DISCONTINUED | OUTPATIENT
Start: 2024-08-20 | End: 2024-08-21 | Stop reason: HOSPADM

## 2024-08-19 RX ADMIN — HYDROMORPHONE HYDROCHLORIDE 1 MG: 1 INJECTION, SOLUTION INTRAMUSCULAR; INTRAVENOUS; SUBCUTANEOUS at 18:16

## 2024-08-19 RX ADMIN — KIT FOR THE PREPARATION OF TECHNETIUM TC 99M PENTETATE 2 MILLICURIE: 20 INJECTION, POWDER, LYOPHILIZED, FOR SOLUTION INTRAVENOUS; RESPIRATORY (INHALATION) at 15:16

## 2024-08-19 RX ADMIN — HYDROMORPHONE HYDROCHLORIDE 1 MG: 1 INJECTION, SOLUTION INTRAMUSCULAR; INTRAVENOUS; SUBCUTANEOUS at 12:43

## 2024-08-19 RX ADMIN — HYDROMORPHONE HYDROCHLORIDE 1 MG: 1 INJECTION, SOLUTION INTRAMUSCULAR; INTRAVENOUS; SUBCUTANEOUS at 02:53

## 2024-08-19 RX ADMIN — ENOXAPARIN SODIUM 70 MG: 80 INJECTION SUBCUTANEOUS at 08:12

## 2024-08-19 RX ADMIN — ACETAMINOPHEN 975 MG: 325 TABLET ORAL at 22:47

## 2024-08-19 RX ADMIN — HYDROMORPHONE HYDROCHLORIDE 1 MG: 1 INJECTION, SOLUTION INTRAMUSCULAR; INTRAVENOUS; SUBCUTANEOUS at 15:46

## 2024-08-19 RX ADMIN — HYDROMORPHONE HYDROCHLORIDE 1 MG: 1 INJECTION, SOLUTION INTRAMUSCULAR; INTRAVENOUS; SUBCUTANEOUS at 10:32

## 2024-08-19 RX ADMIN — HYDROXYUREA 3000 MG: 500 CAPSULE ORAL at 08:18

## 2024-08-19 RX ADMIN — HYDROMORPHONE HYDROCHLORIDE 1 MG: 1 INJECTION, SOLUTION INTRAMUSCULAR; INTRAVENOUS; SUBCUTANEOUS at 22:47

## 2024-08-19 RX ADMIN — HYDROMORPHONE HYDROCHLORIDE 1 MG: 1 INJECTION, SOLUTION INTRAMUSCULAR; INTRAVENOUS; SUBCUTANEOUS at 08:08

## 2024-08-19 RX ADMIN — GABAPENTIN 300 MG: 300 CAPSULE ORAL at 08:13

## 2024-08-19 RX ADMIN — ASPIRIN 81 MG CHEWABLE TABLET 81 MG: 81 TABLET CHEWABLE at 08:13

## 2024-08-19 RX ADMIN — HYDROMORPHONE HYDROCHLORIDE 1 MG: 1 INJECTION, SOLUTION INTRAMUSCULAR; INTRAVENOUS; SUBCUTANEOUS at 20:31

## 2024-08-19 RX ADMIN — Medication 5 ML: at 05:46

## 2024-08-19 RX ADMIN — OMEPRAZOLE 20 MG: 20 CAPSULE, DELAYED RELEASE ORAL at 08:13

## 2024-08-19 RX ADMIN — Medication 5 ML: at 02:53

## 2024-08-19 RX ADMIN — HYDROMORPHONE HYDROCHLORIDE 1 MG: 1 INJECTION, SOLUTION INTRAMUSCULAR; INTRAVENOUS; SUBCUTANEOUS at 00:48

## 2024-08-19 RX ADMIN — Medication 5 ML: at 00:49

## 2024-08-19 RX ADMIN — METOCLOPRAMIDE 5 MG: 5 TABLET ORAL at 22:55

## 2024-08-19 RX ADMIN — DEFERASIROX 1440 MG: 360 TABLET, FILM COATED ORAL at 20:31

## 2024-08-19 RX ADMIN — KIT FOR THE PREPARATION OF TECHNETIUM TC 99M ALBUMIN AGGREGATED 6.3 MILLICURIE: 2.5 INJECTION, POWDER, FOR SOLUTION INTRAVENOUS at 15:18

## 2024-08-19 RX ADMIN — ACETAMINOPHEN 975 MG: 325 TABLET ORAL at 10:32

## 2024-08-19 RX ADMIN — GABAPENTIN 300 MG: 300 CAPSULE ORAL at 20:31

## 2024-08-19 RX ADMIN — Medication 5 ML: at 05:37

## 2024-08-19 RX ADMIN — Medication 5 ML: at 08:10

## 2024-08-19 RX ADMIN — HYDROMORPHONE HYDROCHLORIDE 1 MG: 1 INJECTION, SOLUTION INTRAMUSCULAR; INTRAVENOUS; SUBCUTANEOUS at 05:37

## 2024-08-19 RX ADMIN — ASPIRIN 81 MG CHEWABLE TABLET 81 MG: 81 TABLET CHEWABLE at 20:31

## 2024-08-19 ASSESSMENT — ACTIVITIES OF DAILY LIVING (ADL)
ADLS_ACUITY_SCORE: 22
ADLS_ACUITY_SCORE: 22
ADLS_ACUITY_SCORE: 23
ADLS_ACUITY_SCORE: 22
ADLS_ACUITY_SCORE: 23
ADLS_ACUITY_SCORE: 23
ADLS_ACUITY_SCORE: 22
ADLS_ACUITY_SCORE: 23
ADLS_ACUITY_SCORE: 23
ADLS_ACUITY_SCORE: 22
ADLS_ACUITY_SCORE: 23
ADLS_ACUITY_SCORE: 22
ADLS_ACUITY_SCORE: 23

## 2024-08-19 NOTE — PLAN OF CARE
"  VS: /84 (BP Location: Left arm)   Pulse 83   Temp 97.9  F (36.6  C) (Oral)   Resp 16   Ht 1.626 m (5' 4.02\")   Wt 74.9 kg (165 lb 2 oz)   LMP  (LMP Unknown)   SpO2 92%   BMI 28.33 kg/m     O2: O2 at 2 LPM via oxymask  Intermittently removing and able to tolerate to room air, sating >90%   Output: Continent   Last BM: As per pt report 8-   Activity: Up independently   Skin: Intact   Pain: Managed with PRN dilaudid   CMS: A&Ox4, denies numbness and tingling   Dressing: N/A   Diet: Regular diet, vomited once, refused any medication initially  Before shift ends requested reglan PO   LDA: Single lumen port on L chest saline locked, refused heparin lock every after medication   Equipment: Personal items, call light   Plan: Pending, to continue POC   Additional Info:        "

## 2024-08-19 NOTE — PROGRESS NOTES
A/Ox's 4. Pt rated pain as tolerable and trending better per pt report. Iv Dilaudid given or pain control. Pt has a Port. [facility]. Tolerated regular diet. Pt given PRN neb x1 times. Pt still needed 3-5L of oxygen, Unable to wean. Voiding without pain or difficulty. Passing flatus. Pt up ind. Resting in bed at this time with call light in reach. Able to make needs known.

## 2024-08-19 NOTE — PROGRESS NOTES
[facility]    Medicine Progress Note - Hospitalist Service, GOLD TEAM 22    Date of Admission:  8/16/2024    Assessment & Plan      Jennifer Cervantes is a 24 year old female with a history of sickle cell anemia, cognitive delays and right upper extremity hemiparesis due to past CVA, iron overload, anxiety and depression, who was admitted to [facility] on 8/16/2024 for further evaluation and treatment of acute pain event and rule out of PE.     Updates:  V/Q scan negative and stopped LMWH  Transthoracic echocardiogram negative for significant pulmonary hypertension  Will check ambulatory pulse ox in AM  Requesting discharge in AM    Acute hypoxic respiratory failure  Asthma  Hx of acute pulmonary embolism  In the ED, noted to be hypoxic to 89% and again to low 80's on recheck. CXR unremarkable. Unable to obtain CTPE to rule out PE due to contrast allergy. Started empirically on Lovenox by ED team. Patient overall asymptomatic--denies shortness of breath, weakness, fatigue, dizziness.   - Continue therapeutic Lovenox for now  - VQ scan ordered. Discussed previously about premedications prior to CT chest to reduce the risk of allergic reaction; discussed about sensitivity/specificity of V/Q scan which can be inferior to CT chest. Patient still is not interested in pursuing CT chest with premedications and would like to pursue with V/Q scan. Patient does not does clearly rule out recurrent VTEs. Checked LE US Doppler for pretest probability and negative. high sensitivity Troponin T and NT-Pro NT-Pro BN WNL. Prior Transthoracic echocardiogram (Apr 2024) with RVSP of 28. In view of sickle cell disease with unexplained hypoxia, will repeat Transthoracic echocardiogram but no signs of overt RV failure. No criteria for acute chest, pneumonia, asthma exacerbation but will monitor   - Continuous pulse ox  - continue oxygen considering sickle cell crisis  - Viral studies  - Continue PTA  albuterol PRN and Breo Ellipta (subbed for Symbicort)  - Transthoracic echocardiogram negative for significant pulmonary hypertension. V/Q scan negative for acute pulmonary embolism and will stop therapeutic LMWH.  - Differential now includes atelectasis, acute sickle cell pain with rapid shallow breathing. Continue to encourage incentive spirometry use and consider CPT if agreeable    Acute Pain Event  Sickle Cell Anemia  Follows closely with [facility] as outpatient. Last seen day prior to admission. Reports she received her Jr infusion day prior to admission, which typically precipitates pain events for her thus presented to the ED for further management. In the ED, labs near baseline. Patient with hypoxia as noted above, but otherwise hemodynamically stable. Overall, feels pain responsive to current regimen.   - Pain management   - Dilaudid 1 mg IV q2h PRN (less than assumed dosing of 1 mg every hour on care plan per patient request), - Tylenol 1,000 mg q6h scheduled,  Gabapentin TID scheduled,  Methocarbamol 750 mg QID PRN. Stopped Toradol in view of AC use   - Follow CBC with diff, retic, bilirubin  - S/P LR infusion  - Continue PTA hydroxyurea; incentive spirometry use  - Hematology consulted and appreciate recommendations  - improving    Chronic abdominal pain  Chronic nausea   Ongoing, chronic issue. Does not feel worse on admission. PTA on Reglan & omeprazole.   - Continue PTA reglan & omeprazole  - Check Qtc (last checked in early July)  - Follow up with GI in November    Hemochromatosis Continue PTA Jadenu.   History of CVA Continue PTA ASA.         Diet: Combination Diet Regular Diet Adult    DVT Prophylaxis: Enoxaparin (Lovenox) SQ  Lopez Catheter: Not present  Lines: PRESENT      Port A Cath Single 04/21/21 Left Chest wall-Site Assessment: WDL      Cardiac Monitoring: None  Code Status: Full Code      Clinically Significant Risk Factors                              # Overweight: Estimated body  "mass index is 28.33 kg/m  as calculated from the following:    Height as of this encounter: 1.626 m (5' 4.02\").    Weight as of this encounter: 74.9 kg (165 lb 2 oz)., PRESENT ON ADMISSION       # Financial/Environmental Concerns: none  # Asthma: noted on problem list              Disposition Plan     Medically Ready for Discharge: Anticipated in 5+ Days         Jer Isabel MD  Hospitalist Service, GOLD TEAM 22  [facility]  Securely message with [employer] (more info)  Text page via [facility] Paging/Directory   See signed in provider for up to date coverage information  ______________________________________________________________________    Interval History     Eager to leave the hospital and does not feel that her pain will be a limiting factor. No shortness of breath or new complaints    Physical Exam   Vital Signs: Temp: 97.9  F (36.6  C) Temp src: Oral BP: 131/81 Pulse: 81   Resp: 17 SpO2: 99 % O2 Device: Oxymask Oxygen Delivery: 4 LPM  Weight: 165 lbs 1.99 oz    Physical Exam  Constitutional:       Appearance: Normal appearance.   HENT:      Head: Normocephalic.      Mouth/Throat:      Mouth: Mucous membranes are moist.   Eyes:      Extraocular Movements: Extraocular movements intact.      Pupils: Pupils are equal, round, and reactive to light.   Cardiovascular:      Rate and Rhythm: Normal rate and regular rhythm.      Heart sounds: No murmur heard.  Pulmonary:      Effort: Pulmonary effort is normal. No respiratory distress.      Breath sounds: Normal breath sounds. No wheezing.   Abdominal:      General: Abdomen is flat. Bowel sounds are normal.      Tenderness: There is no abdominal tenderness.   Musculoskeletal:      Cervical back: Normal range of motion.   Neurological:      Mental Status: She is alert and oriented to person, place, and time.      Comments: Right hemiparesis           Medical Decision Making       54 MINUTES SPENT BY ME on the date of service " doing chart review, history, exam, documentation & further activities per the note.      Data     I have personally reviewed the following data over the past 24 hrs:    13.8 (H)  \   6.8 (LL)   / 400     139 101 3.9 (L) /  80   3.8 27 0.49 (L) \     ALT: N/A AST: N/A AP: N/A TBILI: 3.1 (H)   ALB: N/A TOT PROTEIN: N/A LIPASE: N/A     Trop: N/A BNP: N/A     Ferritin:  N/A % Retic:  18.0 (H) LDH:  N/A       Imaging results reviewed over the past 24 hrs:   Recent Results (from the past 24 hour(s))   US Lower Extremity Venous Duplex Bilateral    Narrative    EXAMINATION: DOPPLER VENOUS ULTRASOUND OF BILATERAL LOWER EXTREMITIES,  8/18/2024 11:21 AM     COMPARISON: ] Lower extremity venous ultrasound 3/14/2024    HISTORY: Suspect acute pulmonary embolism with inability to obtain CT  chest imaging to help with pretest probability     TECHNIQUE:  Gray-scale evaluation with compression, spectral flow and  color Doppler assessment of the deep venous system of both legs from  groin to knee, and then at the ankles.    FINDINGS:  In both lower extremities, the common femoral, femoral, popliteal and  posterior tibial veins demonstrate normal compressibility and blood  flow.      Impression    IMPRESSION:  No evidence of deep venous thrombosis in either lower extremity.    I have personally reviewed the examination and initial interpretation  and I agree with the findings.    MANSI GAFFNEY MD         SYSTEM ID:  [id number]

## 2024-08-19 NOTE — PROGRESS NOTES
Alert and oriented times four. Soft spoken. IV Dilaudid given every two hours for pain. Left Port a Cath heparin locked. Right arm hemiparesis. Independent in room. Continent of bowel and bladder. Last BM 8/17. Regular diet. Intermittent nausea. Ate 25% of breakfast and lunch. 2-4L O2 on oximyzer mask.     Patient's most recent vital signs are:     Vital signs:  BP: 131/81  Temp: 97.9  HR: 81  RR: 17  SpO2: 99 %     Patient does not have new respiratory symptoms.  Patient does not have new sore throat.  Patient does not have a fever greater than 99.5.

## 2024-08-20 ENCOUNTER — APPOINTMENT (OUTPATIENT)
Dept: GENERAL RADIOLOGY | Facility: CLINIC | Age: 24
DRG: 811 | End: 2024-08-20
Attending: STUDENT IN AN ORGANIZED HEALTH CARE EDUCATION/TRAINING PROGRAM
Payer: COMMERCIAL

## 2024-08-20 LAB
ANION GAP SERPL CALCULATED.3IONS-SCNC: 14 MMOL/L (ref 7–15)
BASOPHILS # BLD AUTO: 0.1 10E3/UL (ref 0–0.2)
BASOPHILS NFR BLD AUTO: 1 %
BILIRUB SERPL-MCNC: 4.6 MG/DL
BUN SERPL-MCNC: 4.3 MG/DL (ref 6–20)
CALCIUM SERPL-MCNC: 8.9 MG/DL (ref 8.8–10.4)
CHLORIDE SERPL-SCNC: 97 MMOL/L (ref 98–107)
CREAT SERPL-MCNC: 0.5 MG/DL (ref 0.51–0.95)
EGFRCR SERPLBLD CKD-EPI 2021: >90 ML/MIN/1.73M2
EOSINOPHIL # BLD AUTO: 0.3 10E3/UL (ref 0–0.7)
EOSINOPHIL NFR BLD AUTO: 3 %
ERYTHROCYTE [DISTWIDTH] IN BLOOD BY AUTOMATED COUNT: 25.1 % (ref 10–15)
GLUCOSE SERPL-MCNC: 78 MG/DL (ref 70–99)
HCO3 SERPL-SCNC: 26 MMOL/L (ref 22–29)
HCT VFR BLD AUTO: 18.5 % (ref 35–47)
HGB BLD-MCNC: 6.9 G/DL (ref 11.7–15.7)
HOLD SPECIMEN: NORMAL
IMM GRANULOCYTES # BLD: 0.1 10E3/UL
IMM GRANULOCYTES NFR BLD: 1 %
LYMPHOCYTES # BLD AUTO: 1.3 10E3/UL (ref 0.8–5.3)
LYMPHOCYTES NFR BLD AUTO: 10 %
MCH RBC QN AUTO: 31.9 PG (ref 26.5–33)
MCHC RBC AUTO-ENTMCNC: 37.3 G/DL (ref 31.5–36.5)
MCV RBC AUTO: 86 FL (ref 78–100)
MONOCYTES # BLD AUTO: 1.2 10E3/UL (ref 0–1.3)
MONOCYTES NFR BLD AUTO: 10 %
NEUTROPHILS # BLD AUTO: 10 10E3/UL (ref 1.6–8.3)
NEUTROPHILS NFR BLD AUTO: 75 %
NRBC # BLD AUTO: 0.4 10E3/UL
NRBC BLD AUTO-RTO: 3 /100
PLATELET # BLD AUTO: 390 10E3/UL (ref 150–450)
POTASSIUM SERPL-SCNC: 4.1 MMOL/L (ref 3.4–5.3)
RBC # BLD AUTO: 2.16 10E6/UL (ref 3.8–5.2)
RETICS # AUTO: 0.34 10E6/UL (ref 0.03–0.1)
RETICS/RBC NFR AUTO: 17.4 % (ref 0.5–2)
SODIUM SERPL-SCNC: 137 MMOL/L (ref 135–145)
WBC # BLD AUTO: 13 10E3/UL (ref 4–11)

## 2024-08-20 PROCEDURE — 250N000011 HC RX IP 250 OP 636: Performed by: HOSPITALIST

## 2024-08-20 PROCEDURE — 71046 X-RAY EXAM CHEST 2 VIEWS: CPT

## 2024-08-20 PROCEDURE — 250N000011 HC RX IP 250 OP 636: Performed by: PHYSICIAN ASSISTANT

## 2024-08-20 PROCEDURE — 36592 COLLECT BLOOD FROM PICC: CPT | Performed by: HOSPITALIST

## 2024-08-20 PROCEDURE — 85025 COMPLETE CBC W/AUTO DIFF WBC: CPT | Performed by: HOSPITALIST

## 2024-08-20 PROCEDURE — 99232 SBSQ HOSP IP/OBS MODERATE 35: CPT | Performed by: INTERNAL MEDICINE

## 2024-08-20 PROCEDURE — 250N000013 HC RX MED GY IP 250 OP 250 PS 637

## 2024-08-20 PROCEDURE — 85045 AUTOMATED RETICULOCYTE COUNT: CPT | Performed by: HOSPITALIST

## 2024-08-20 PROCEDURE — 71046 X-RAY EXAM CHEST 2 VIEWS: CPT | Mod: 26 | Performed by: RADIOLOGY

## 2024-08-20 PROCEDURE — 82247 BILIRUBIN TOTAL: CPT | Performed by: HOSPITALIST

## 2024-08-20 PROCEDURE — 250N000011 HC RX IP 250 OP 636: Performed by: STUDENT IN AN ORGANIZED HEALTH CARE EDUCATION/TRAINING PROGRAM

## 2024-08-20 PROCEDURE — 250N000013 HC RX MED GY IP 250 OP 250 PS 637: Performed by: HOSPITALIST

## 2024-08-20 PROCEDURE — 99233 SBSQ HOSP IP/OBS HIGH 50: CPT | Performed by: STUDENT IN AN ORGANIZED HEALTH CARE EDUCATION/TRAINING PROGRAM

## 2024-08-20 PROCEDURE — 80048 BASIC METABOLIC PNL TOTAL CA: CPT | Performed by: HOSPITALIST

## 2024-08-20 PROCEDURE — 120N000002 HC R&B MED SURG/OB UMMC

## 2024-08-20 PROCEDURE — 99207 PR NO BILLABLE SERVICE THIS VISIT: CPT | Performed by: STUDENT IN AN ORGANIZED HEALTH CARE EDUCATION/TRAINING PROGRAM

## 2024-08-20 RX ORDER — PROCHLORPERAZINE 25 MG
25 SUPPOSITORY, RECTAL RECTAL EVERY 12 HOURS PRN
Status: DISCONTINUED | OUTPATIENT
Start: 2024-08-20 | End: 2024-08-21 | Stop reason: HOSPADM

## 2024-08-20 RX ORDER — HYDROXYUREA 500 MG/1
3000 CAPSULE ORAL DAILY
Status: DISCONTINUED | OUTPATIENT
Start: 2024-08-20 | End: 2024-08-21 | Stop reason: HOSPADM

## 2024-08-20 RX ORDER — NALOXONE HYDROCHLORIDE 0.4 MG/ML
0.2 INJECTION, SOLUTION INTRAMUSCULAR; INTRAVENOUS; SUBCUTANEOUS
Status: DISCONTINUED | OUTPATIENT
Start: 2024-08-20 | End: 2024-08-21 | Stop reason: HOSPADM

## 2024-08-20 RX ORDER — PROCHLORPERAZINE MALEATE 5 MG
10 TABLET ORAL EVERY 6 HOURS PRN
Status: DISCONTINUED | OUTPATIENT
Start: 2024-08-20 | End: 2024-08-21 | Stop reason: HOSPADM

## 2024-08-20 RX ORDER — ONDANSETRON 4 MG/1
4 TABLET, ORALLY DISINTEGRATING ORAL EVERY 6 HOURS PRN
Status: DISCONTINUED | OUTPATIENT
Start: 2024-08-20 | End: 2024-08-21 | Stop reason: HOSPADM

## 2024-08-20 RX ORDER — NALOXONE HYDROCHLORIDE 0.4 MG/ML
0.4 INJECTION, SOLUTION INTRAMUSCULAR; INTRAVENOUS; SUBCUTANEOUS
Status: DISCONTINUED | OUTPATIENT
Start: 2024-08-20 | End: 2024-08-21 | Stop reason: HOSPADM

## 2024-08-20 RX ORDER — ONDANSETRON 2 MG/ML
4 INJECTION INTRAMUSCULAR; INTRAVENOUS EVERY 6 HOURS PRN
Status: DISCONTINUED | OUTPATIENT
Start: 2024-08-20 | End: 2024-08-21 | Stop reason: HOSPADM

## 2024-08-20 RX ADMIN — Medication 5 ML: at 20:41

## 2024-08-20 RX ADMIN — HYDROMORPHONE HYDROCHLORIDE 1 MG: 1 INJECTION, SOLUTION INTRAMUSCULAR; INTRAVENOUS; SUBCUTANEOUS at 11:00

## 2024-08-20 RX ADMIN — HYDROMORPHONE HYDROCHLORIDE 1 MG: 1 INJECTION, SOLUTION INTRAMUSCULAR; INTRAVENOUS; SUBCUTANEOUS at 00:50

## 2024-08-20 RX ADMIN — Medication 5 ML: at 15:55

## 2024-08-20 RX ADMIN — ONDANSETRON 4 MG: 2 INJECTION INTRAMUSCULAR; INTRAVENOUS at 12:36

## 2024-08-20 RX ADMIN — Medication 5 ML: at 11:00

## 2024-08-20 RX ADMIN — HYDROMORPHONE HYDROCHLORIDE 1 MG: 1 INJECTION, SOLUTION INTRAMUSCULAR; INTRAVENOUS; SUBCUTANEOUS at 22:36

## 2024-08-20 RX ADMIN — HYDROMORPHONE HYDROCHLORIDE 1 MG: 1 INJECTION, SOLUTION INTRAMUSCULAR; INTRAVENOUS; SUBCUTANEOUS at 08:39

## 2024-08-20 RX ADMIN — ASPIRIN 81 MG CHEWABLE TABLET 81 MG: 81 TABLET CHEWABLE at 20:44

## 2024-08-20 RX ADMIN — Medication 5 ML: at 13:53

## 2024-08-20 RX ADMIN — HYDROMORPHONE HYDROCHLORIDE 1 MG: 1 INJECTION, SOLUTION INTRAMUSCULAR; INTRAVENOUS; SUBCUTANEOUS at 18:25

## 2024-08-20 RX ADMIN — HYDROXYUREA 3000 MG: 500 CAPSULE ORAL at 15:59

## 2024-08-20 RX ADMIN — ACETAMINOPHEN 975 MG: 325 TABLET ORAL at 16:00

## 2024-08-20 RX ADMIN — HYDROMORPHONE HYDROCHLORIDE 1 MG: 1 INJECTION, SOLUTION INTRAMUSCULAR; INTRAVENOUS; SUBCUTANEOUS at 04:39

## 2024-08-20 RX ADMIN — OMEPRAZOLE 20 MG: 20 CAPSULE, DELAYED RELEASE ORAL at 16:00

## 2024-08-20 RX ADMIN — HEPARIN, PORCINE (PF) 10 UNIT/ML INTRAVENOUS SYRINGE 5 ML: at 08:40

## 2024-08-20 RX ADMIN — ENOXAPARIN SODIUM 40 MG: 40 INJECTION SUBCUTANEOUS at 08:44

## 2024-08-20 RX ADMIN — Medication 5 ML: at 12:40

## 2024-08-20 RX ADMIN — FLUTICASONE FUROATE AND VILANTEROL TRIFENATATE 1 PUFF: 100; 25 POWDER RESPIRATORY (INHALATION) at 10:21

## 2024-08-20 RX ADMIN — DEFERASIROX 1440 MG: 360 TABLET, FILM COATED ORAL at 20:44

## 2024-08-20 RX ADMIN — Medication 5 ML: at 10:19

## 2024-08-20 RX ADMIN — HYDROMORPHONE HYDROCHLORIDE 1 MG: 1 INJECTION, SOLUTION INTRAMUSCULAR; INTRAVENOUS; SUBCUTANEOUS at 15:55

## 2024-08-20 RX ADMIN — Medication 5 ML: at 22:36

## 2024-08-20 RX ADMIN — SODIUM CHLORIDE, PRESERVATIVE FREE 5 ML: 5 INJECTION INTRAVENOUS at 08:00

## 2024-08-20 RX ADMIN — HYDROMORPHONE HYDROCHLORIDE 1 MG: 1 INJECTION, SOLUTION INTRAMUSCULAR; INTRAVENOUS; SUBCUTANEOUS at 13:53

## 2024-08-20 RX ADMIN — HYDROMORPHONE HYDROCHLORIDE 1 MG: 1 INJECTION, SOLUTION INTRAMUSCULAR; INTRAVENOUS; SUBCUTANEOUS at 20:37

## 2024-08-20 RX ADMIN — HYDROMORPHONE HYDROCHLORIDE 1 MG: 1 INJECTION, SOLUTION INTRAMUSCULAR; INTRAVENOUS; SUBCUTANEOUS at 06:40

## 2024-08-20 RX ADMIN — ACETAMINOPHEN 975 MG: 325 TABLET ORAL at 22:36

## 2024-08-20 RX ADMIN — GABAPENTIN 300 MG: 300 CAPSULE ORAL at 20:44

## 2024-08-20 RX ADMIN — GABAPENTIN 300 MG: 300 CAPSULE ORAL at 13:09

## 2024-08-20 RX ADMIN — Medication 5 ML: at 18:25

## 2024-08-20 RX ADMIN — ASPIRIN 81 MG CHEWABLE TABLET 81 MG: 81 TABLET CHEWABLE at 08:44

## 2024-08-20 RX ADMIN — METHOCARBAMOL 750 MG: 750 TABLET ORAL at 13:05

## 2024-08-20 RX ADMIN — HYDROMORPHONE HYDROCHLORIDE 1 MG: 1 INJECTION, SOLUTION INTRAMUSCULAR; INTRAVENOUS; SUBCUTANEOUS at 13:02

## 2024-08-20 ASSESSMENT — ACTIVITIES OF DAILY LIVING (ADL)
ADLS_ACUITY_SCORE: 23

## 2024-08-20 NOTE — PROGRESS NOTES
[facility]    Medicine Progress Note - Hospitalist Service, GOLD TEAM 22    Date of Admission:  8/16/2024    Assessment & Plan      Jennifer Cervantes is a 24 year old female with a history of sickle cell anemia, cognitive delays and right upper extremity hemiparesis due to past CVA, iron overload, anxiety and depression, who was admitted to [facility] on 8/16/2024 for further evaluation and treatment of acute pain event and rule out of PE.     Updates:  V/Q scan negative and stopped LMWH  Transthoracic echocardiogram negative for significant pulmonary hypertension  Increased pain with additional IV dilaudid given  Remains hypoxic with some increased O2 needs (on 5L) but no additional symptoms. CXR without abnormality  Will consider discharge on 8/21 with new start O2 pending improvements in pain    Acute hypoxic respiratory failure  Asthma  Hx of acute pulmonary embolism  In the ED, noted to be hypoxic to 89% and again to low 80's on recheck. CXR unremarkable. Unable to obtain CTPE to rule out PE due to contrast allergy. Started empirically on Lovenox by ED team. Patient overall asymptomatic--denies shortness of breath, weakness, fatigue, dizziness.   - Continue therapeutic Lovenox for now  - VQ scan ordered. Discussed previously about premedications prior to CT chest to reduce the risk of allergic reaction; discussed about sensitivity/specificity of V/Q scan which can be inferior to CT chest. Patient still is not interested in pursuing CT chest with premedications and would like to pursue with V/Q scan. Patient does not does clearly rule out recurrent VTEs. Checked LE US Doppler for pretest probability and negative. high sensitivity Troponin T and NT-Pro NT-Pro BN WNL. Prior Transthoracic echocardiogram (Apr 2024) with RVSP of 28. In view of sickle cell disease with unexplained hypoxia, will repeat Transthoracic echocardiogram but no signs of overt RV failure. No criteria for  acute chest, pneumonia, asthma exacerbation but will monitor   - Continuous pulse ox  - continue oxygen considering sickle cell crisis  - Viral studies  - Continue PTA albuterol PRN and Breo Ellipta (subbed for Symbicort)  - Transthoracic echocardiogram negative for significant pulmonary hypertension. V/Q scan negative for acute pulmonary embolism and will stop therapeutic LMWH.  - Differential now includes atelectasis, acute sickle cell pain with rapid shallow breathing. Continue to encourage incentive spirometry use and consider CPT if agreeable    Acute Pain Event  Sickle Cell Anemia  Follows closely with [facility]ology as outpatient. Last seen day prior to admission. Reports she received her Jr infusion day prior to admission, which typically precipitates pain events for her thus presented to the ED for further management. In the ED, labs near baseline. Patient with hypoxia as noted above, but otherwise hemodynamically stable. Overall, feels pain responsive to current regimen.   - Pain management   - Dilaudid 1 mg IV q2h PRN (less than assumed dosing of 1 mg every hour on care plan per patient request), - Tylenol 1,000 mg q6h scheduled,  Gabapentin TID scheduled,  Methocarbamol 750 mg QID PRN. Stopped Toradol in view of AC use   - Follow CBC with diff, retic, bilirubin  - S/P LR infusion  - Continue PTA hydroxyurea; incentive spirometry use  - Hematology consulted and appreciate recommendations  - improving    Chronic abdominal pain  Chronic nausea   Ongoing, chronic issue. Does not feel worse on admission. PTA on Reglan & omeprazole.   - Continue PTA reglan & omeprazole  - Check Qtc (last checked in early July)  - Follow up with GI in November    Hemochromatosis Continue PTA Jadenu.   History of CVA Continue PTA ASA.         Diet: Combination Diet Regular Diet Adult  Diet    DVT Prophylaxis: Enoxaparin (Lovenox) SQ  Lopez Catheter: Not present  Lines: PRESENT      Port A Cath Single 04/21/21 Left Chest  "wall-Site Assessment: WDL      Cardiac Monitoring: None  Code Status: Full Code      Clinically Significant Risk Factors                              # Overweight: Estimated body mass index is 28.33 kg/m  as calculated from the following:    Height as of this encounter: 1.626 m (5' 4.02\").    Weight as of this encounter: 74.9 kg (165 lb 2 oz)., PRESENT ON ADMISSION       # Financial/Environmental Concerns: none  # Asthma: noted on problem list              Disposition Plan     Medically Ready for Discharge: Anticipated in 5+ Days         Estee Winn MD  Hospitalist Service, GOLD TEAM 22  [facility]  Securely message with [employer] (more info)  Text page via [facility] Paging/Directory   See signed in provider for up to date coverage information  ______________________________________________________________________    Interval History     Eager to leave the hospital in next day and does not feel that her pain will be a limiting factor. No shortness of breath or new complaints    Physical Exam   Vital Signs: Temp: 97.8  F (36.6  C) Temp src: Oral BP: 128/84 Pulse: 104   Resp: 22 SpO2: 99 % O2 Device: Oxymask Oxygen Delivery: 5 LPM  Weight: 165 lbs 1.99 oz    Physical Exam  Constitutional:       Appearance: Normal appearance.   HENT:      Head: Normocephalic.      Mouth/Throat:      Mouth: Mucous membranes are moist.   Eyes:      Extraocular Movements: Extraocular movements intact.      Pupils: Pupils are equal, round, and reactive to light.   Cardiovascular:      Rate and Rhythm: Normal rate and regular rhythm.      Heart sounds: No murmur heard.  Pulmonary:      Effort: Pulmonary effort is normal. No respiratory distress.      Breath sounds: Normal breath sounds. No wheezing.   Abdominal:      General: Abdomen is flat. Bowel sounds are normal.      Tenderness: There is no abdominal tenderness.   Musculoskeletal:      Cervical back: Normal range of motion. "   Neurological:      Mental Status: She is alert and oriented to person, place, and time.      Comments: Right hemiparesis           Medical Decision Making       54 MINUTES SPENT BY ME on the date of service doing chart review, history, exam, documentation & further activities per the note.      Data     I have personally reviewed the following data over the past 24 hrs:    13.0 (H)  \   6.9 (LL)   / 390     137 97 (L) 4.3 (L) /  78   4.1 26 0.50 (L) \     ALT: N/A AST: N/A AP: N/A TBILI: 4.6 (H)   ALB: N/A TOT PROTEIN: N/A LIPASE: N/A     Ferritin:  N/A % Retic:  17.4 (H) LDH:  N/A       Imaging results reviewed over the past 24 hrs:   Recent Results (from the past 24 hour(s))   XR Chest 2 Views    Narrative    EXAM: XR CHEST 2 VIEWS  8/20/2024 12:26 PM      HISTORY: hypoxia    COMPARISON: Chest x-ray 8/19/2024.    FINDINGS: Two views of the chest. Left-sided Port-A-Cath with the tip  again projecting over the cavoatrial junction. No significant  pneumothorax. Bilateral costophrenic are sharp, no pleural effusion.  No acute airspace opacity. The cardiomediastinal silhouette is  unremarkable. Mild elevation of the right hemidiaphragm.      Impression    IMPRESSION: Clear chest.

## 2024-08-20 NOTE — PROGRESS NOTES
"Hematology Consult Progress Note     I reviewed the patient's history and pertinent medical records on 8/20/2024.  I met with the patient and discussed my impression and recommendations.       EVENTS/SUBJECTIVE  She was on room air when I met with her and appeared comfortable from a respiratory standpoint.  She is still endorsing pain but does think that she has made good progress on her pain plan and is actually hoping to go home today versus tomorrow.       OBJECTIVE  Review of pertinent results as follows:  --Her VQ scan was negative for pulmonary embolism   --TTE could not assess PASP but \"PA acceleration time is normal implying the absence of elevated pulmonary vascular resistance\"  --Repeat chest radiograph showed no acute findings and improved aeration     RECOMMENDATIONS  Hypoxia  --VQ scan negative for PE and TTE with no clear evidence pHTN as above  --She was comfortable on room air when I met with her  --On discussion in care conference yesterday she often has an oxygen saturation in high 80s-low 90s in clinic/infusion so element of baseline hypoxia  --Can follow up outpatient with pulmonary/cardiology to assess if there is indication for further workup of pHTN and/or home oxygen  Acute Vaso-Occlusive Pain Episode  --Continue aggressive pain control per pain plan/primary team  --She is interested in weaning and possibly going home today/tomorrow  --Aggressive bowel regimen  --Please avoid IV benadryl  Acute Chest Syndrome (ACS) Prevention  --Monitor oxygenation and respiratory rate carefully  --Encourage incentive spirometry and ambulation  --Monitor for fluid overload  --Please page Hematology if patient develops fever, acute shortness of breath, or other new symptoms concerning for acute chest syndrome including if any evidence of infiltrates on chest imaging to be done today  General Sickle Cell Management  --Daily CBC with differential, bilirubin, retic count for now  --Continue folic acid and Hydrea "   Iron Overload  --Continue Carl Thibodeaux MD  Date of Service: 8/20/2024   No

## 2024-08-20 NOTE — PROGRESS NOTES
Attempted multiple times throughout day to administer AM meds but pt refused as she only wanting IV Zofran for nausea. Pt states that she will take Hydroxyurea and Omeprazole in 1 hour.

## 2024-08-20 NOTE — DISCHARGE SUMMARY
[facility]  Hospitalist Discharge Summary      Date of Admission:  8/16/2024  Date of Discharge:  No discharge date for patient encounter.  Discharging Provider: Estee Winn MD  Discharge Service: Hospitalist Service, GOLD TEAM 22    Discharge Diagnoses     Acute hypoxic respiratory failure  Asthma  Hx of acute pulmonary embolism  Acute Pain Event  Sickle Cell Anemia  Chronic abdominal pain  Chronic nausea   Hemochromatosis   History of CVA     Clinically Significant Risk Factors          Follow-ups Needed After Discharge   Follow-up Appointments     Adult [facility] Follow-up and recommended labs and tests      Follow up with primary care provider, Suraj Case, within 7 days   for hospital follow- up.  No follow up labs or test are needed.    Please keep your follow-up appointment with pulmonology.    Appointments on [place] and/or [facility] (with [facility] or [facility]   provider or service). Call [phone] if you haven't heard regarding   these appointments within 7 days of discharge.    Please follow up with Hematology as outpatient            Unresulted Labs Ordered in the Past 30 Days of this Admission       Date and Time Order Name Status Description    7/18/2024 12:33 PM PREPARE RED BLOOD CELLS (UNIT) Preliminary         These results will be followed up by N/A    Discharge Disposition   Discharged to home  Condition at discharge: Stable    Hospital Course   Jennifer Cervantes is a 24 year old female with a history of sickle cell anemia, cognitive delays and right upper extremity hemiparesis due to past CVA, iron overload, anxiety and depression, who was admitted to [facility] on 8/16/2024 for further evaluation and treatment of acute pain event and rule out of PE.     Acute hypoxic respiratory failure  Asthma  Hx of acute pulmonary embolism  In the ED, noted to be hypoxic to 89% and again to low 80's on recheck. CXR unremarkable. Unable to obtain  CTPE to rule out PE due to contrast allergy. Started empirically on Lovenox by ED team. Patient overall asymptomatic--denies shortness of breath, weakness, fatigue, dizziness. VQ scan ordered following discussion with patient regarding premedications prior to CT chest to reduce the risk of allergic reaction; discussed about sensitivity/specificity of V/Q scan which can be inferior to CT chest. Patient was still is not interested in pursuing CT chest with premedications and would like to pursue with V/Q scan. Checked LE US Doppler for pretest probability and negative. high sensitivity Troponin T and NT-Pro NT-Pro BN WNL. Prior Transthoracic echocardiogram (Apr 2024) with RVSP of 28. In view of sickle cell disease with unexplained hypoxia, repeated Transthoracic echocardiogram but no signs of overt RV failure. No concern otherwise for acute chest, pneumonia, asthma exacerbation. Per hematology, hypoxia more likely chronic and progressive in nature. Pulse oximeter with good waveform but some variability noted in readings. Home oxygen test completed and patient discharged with new start of home O2 with referral placed for outpatient pulmonology evaluation. A noncontrasted CT chest was obtained for evaluation of chronic progressive hypoxia for review by outpatient pulmonology with no acute findings. Patient was eager to discharge home and agreeable to outpatient pulm follow-up as scheduled.  - Follow up with pulmonology as scheduled  - New start home oxygen following home O2 assessment  - Continue PTA albuterol PRN and Breo Ellipta (subbed for Symbicort)    Acute Pain Event  Sickle Cell Anemia  Follows closely with [facility] Hematology as outpatient. Last seen day prior to admission. Reports she received her Jr infusion day prior to admission, which typically precipitates pain events for her thus presented to the ED for further management. In the ED, labs near baseline. Patient with hypoxia as noted above, but otherwise  hemodynamically stable. Overall, feels pain responsive to current regimen. Hematology consulted.  - Continue PTA hydroxyurea  - Follow up with hematology as outpatient    Chronic abdominal pain  Chronic nausea   Ongoing, chronic issue. Does not feel worse on admission. PTA on Reglan & omeprazole.   - Continue PTA reglan & omeprazole  - Follow up with GI in November    Hemochromatosis Continue PTA Jadenu.   History of CVA Continue PTA ASA.     Consultations This Hospital Stay   HEMATOLOGY ADULT IP CONSULT    Code Status   Prior    Time Spent on this Encounter   I, Estee Winn MD, personally saw the patient today and spent greater than 30 minutes discharging this patient.       Estee Winn MD  [facility] MED SURG ORTHOPEDIC  [address] [place] [id number]  Phone: [phone]  Fax: [phone]  ______________________________________________________________________    Physical Exam   Vital Signs:                    Weight: 165 lbs 1.99 oz  Physical Exam  Constitutional:       Appearance: Normal appearance.   HENT:      Head: Normocephalic.      Mouth/Throat:      Mouth: Mucous membranes are moist.   Eyes:      Extraocular Movements: Extraocular movements intact.      Pupils: Pupils are equal, round, and reactive to light.   Cardiovascular:      Rate and Rhythm: Normal rate and regular rhythm.      Heart sounds: No murmur heard.  Pulmonary:      Effort: Pulmonary effort is normal. No respiratory distress.      Breath sounds: Normal breath sounds. No wheezing.   Abdominal:      General: Abdomen is flat. Bowel sounds are normal.      Tenderness: There is no abdominal tenderness.   Musculoskeletal:      Cervical back: Normal range of motion.   Neurological:      Mental Status: She is alert and oriented to person, place, and time.      Comments: Right hemiparesis        Primary Care Physician   Suraj Case    Discharge Orders      Reason for your hospital  stay    You were admitted for acute sickle cell crises with hypoxia     Activity    Your activity upon discharge: activity as tolerated     Adult [facility] Follow-up and recommended labs and tests    Follow up with primary care provider, Suraj Case, within 7 days for hospital follow- up.  No follow up labs or test are needed.    Please keep your follow-up appointment with pulmonology.    Appointments on [place] and/or [facility] (with [facility] or [facility] provider or service). Call [phone] if you haven't heard regarding these appointments within 7 days of discharge.    Please follow up with Hematology as outpatient     Oxygen Adult/Peds    Oxygen Documentation  I certify that this patient, Jennifer Cervantes has been under my care (or a nurse practitioner or physican's assistant working with me). This is the face-to-face encounter for oxygen medical necessity.      At the time of this encounter, I have reviewed the qualifying testing and have determined that supplemental oxygen is reasonable and necessary and is expected to improve the patient's condition in a home setting.         Patient has continued oxygen desaturation due to Chronic Respiratory Failure with Hypoxia J96.11.    If portability is ordered, is the patient mobile within the home? yes    Was this visit performed as a telehealth visit: No     Pulse Oximeter Equipment     Diet    Follow this diet upon discharge: Orders Placed This Encounter      Combination Diet Regular Diet Adult       Significant Results and Procedures   Most Recent 3 CBC's:  Recent Labs   Lab Test 08/25/24  0009 08/22/24  1006 08/21/24  0648   WBC 12.4* 12.5* 8.8   HGB 6.6* 6.7* 9.8*   MCV 88 83 83    377 313     Most Recent 3 BMP's:  Recent Labs   Lab Test 08/25/24  0009 08/21/24  0648 08/20/24  0813    138 137   POTASSIUM 3.7 3.9 4.1   CHLORIDE 104 100 97*   CO2 23 30* 26   BUN 9.3 3.5* 4.3*   CR 0.61 0.53 0.50*   ANIONGAP 11 8 14   MICAH 8.9 8.6* 8.9   GLC 95 92  78     Most Recent 2 LFT's:  Recent Labs   Lab Test 08/25/24  0009 08/21/24  0648 08/17/24  0536 08/16/24  1911   AST 67*  --   --  77*   ALT 60*  --   --  41   ALKPHOS 58  --   --  58   BILITOTAL 2.9* 4.3*   < > 3.7*    < > = values in this interval not displayed.     Most Recent INR's and Anticoagulation Dosing History:  Anticoagulation Dose History  More data exists         Latest Ref Rng & Units 3/6/2022 12/23/2022 2/23/2023 5/10/2023 4/23/2024 5/16/2024 5/17/2024   Recent Dosing and Labs   INR 0.85 - 1.15 1.35  1.25  1.24  1.32  1.25  1.29  1.21       Details                 7-Day Micro Results       No results found for the last 168 hours.        ,   Results for orders placed or performed during the hospital encounter of 08/16/24   XR Chest 2 Views    Narrative    EXAM: XR CHEST 2 VIEWS  LOCATION: [facility]  DATE: 8/16/2024    INDICATION: Bilateral arm and lower extremity pain; sickle cell disease.  COMPARISON: 6/25/2024.      Impression    IMPRESSION: No acute airspace consolidation. No pleural effusion or pneumothorax.    Cardiomediastinal silhouette is normal.    Left chest wall port catheter is present. Cholecystectomy.   NM Lung Scan Ventilation and Perfusion    Narrative    Examination:NM LUNG SCAN VENTILATION AND PERFUSION, 8/19/2024 3:34 PM     Indication:  Hypoxia, rule out PE     Additional Information: none    D-Dimer: None    Technique:    The patient received   2  mCi of Tc-99m DTPA aerosol for ventilation  and 6.3 mCi of Tc-99m MAA for perfusion. Multiple images were obtained  of the lungs in Anterior, posterior, RICHTER, RPO, LPO, and  Italian  projections.    Comparison: Same day chest x-ray    Findings:    The ventilation study demonstrates normal inflation of the lungs, with  some tracer in the stomach.    The perfusion study demonstrates no significant perfusion defects.      Impression    Impression:    1. Pulmonary emboli is not present.    2.  Radiotracer within the stomach.    I have personally reviewed the examination and initial interpretation  and I agree with the findings.    RACHEL TRIPATHI MD         SYSTEM ID:  [id number]   US Lower Extremity Venous Duplex Bilateral    Narrative    EXAMINATION: DOPPLER VENOUS ULTRASOUND OF BILATERAL LOWER EXTREMITIES,  8/18/2024 11:21 AM     COMPARISON: ] Lower extremity venous ultrasound 3/14/2024    HISTORY: Suspect acute pulmonary embolism with inability to obtain CT  chest imaging to help with pretest probability     TECHNIQUE:  Gray-scale evaluation with compression, spectral flow and  color Doppler assessment of the deep venous system of both legs from  groin to knee, and then at the ankles.    FINDINGS:  In both lower extremities, the common femoral, femoral, popliteal and  posterior tibial veins demonstrate normal compressibility and blood  flow.      Impression    IMPRESSION:  No evidence of deep venous thrombosis in either lower extremity.    I have personally reviewed the examination and initial interpretation  and I agree with the findings.    MANSI GAFFNEY MD         SYSTEM ID:  [id number]   XR Chest 2 Views    Narrative    Chest 2 views    INDICATION: Hypoxia, rule out pulmonary embolus    COMPARISON: 8/16/2024    FINDINGS: Slightly improved streaky opacities in the lungs noted  bilaterally. Heart size appears normal. Cholecystectomy clips in the  right upper abdomen. Left IJ catheter tip in the distal most SVC  without visible pneumothorax. Bones appear well mineralized.      Impression    IMPRESSION: No acute worsening chest findings. Slightly improved  aeration in the lungs.    MARY JANE JUÁREZ MD         SYSTEM ID:  [id number]   XR Chest 2 Views    Narrative    EXAM: XR CHEST 2 VIEWS  8/20/2024 12:26 PM      HISTORY: hypoxia    COMPARISON: Chest x-ray 8/19/2024.    FINDINGS: Two views of the chest. Left-sided Port-A-Cath with the tip  again projecting over the cavoatrial junction. No  significant  pneumothorax. Bilateral costophrenic are sharp, no pleural effusion.  No acute airspace opacity. The cardiomediastinal silhouette is  unremarkable. Mild elevation of the right hemidiaphragm.      Impression    IMPRESSION: Clear chest.    I have personally reviewed the examination and initial interpretation  and I agree with the findings.    VIOLA ESCOBAR MD         SYSTEM ID:  [id number]   CT Chest w/o Contrast    Narrative    CT chest without contrast    INDICATION: Progressive hypoxia of unclear etiology.    COMPARISON: Plain films today. Most recent CT 5/11/2023.    FINDINGS: No contrast. The included thyroid appears normal. Left-sided  approach venous catheter tip in the distal most SVC. No obviously  abnormal breast tissues. No pleural or pericardial effusion. No  enlarged lymph nodes. Heart size normal. Aorta and pulmonary artery  calibers are normal. Markedly hyperdense spleen also somewhat atrophic  similar to previous. The previous left pleural effusion and lung base  thickening has resolved. Splenic morphology likely due to sickle cell  anemia.  Bone detail shows minimal gentle dextrocurvature centered at the  midthoracic spine.    Detail of the lungs show scattered subsegmental atelectatic linear  densities in the left lung base with other minimal dependent  atelectasis in the lung bases. No dominant pulmonary nodule or  consolidation. Major central airways the paranasal patent.      Impression    IMPRESSION: Scattered subsegmental atelectasis/scarring, left more so  than right lung bases. Atrophic hyperdense spleen consistent with  sickle cell anemia and history. No acute-appearing findings.    MARY JANE JUÁREZ MD         SYSTEM ID:  [id number]   Echo Limited     Value    LVEF  60-65%    Narrative    [id number]  [id number]  [id number]  [id number]^CHERRI^[facility]  Echocardiography Laboratory  [address] [place] [id number]     Name: MIKAELA  HIMANSHU NEWMAN  MRN: [id number]  : 2000  Study Date: 2024 11:03 AM  Age: 24 yrs  Gender: Female  Patient Location: [facility]  Reason For Study: Pulmonary Hypertension  Ordering Physician: ERINN HARLEY  Performed By: Gage Knapp RDCS     BSA: 1.8 m2  Height: 64 in  Weight: 165 lb  ______________________________________________________________________________  Procedure  Limited Portable Echo Adult.  ______________________________________________________________________________  Interpretation Summary  Global and regional left ventricular function is normal with an EF of 60-65%.  Global right ventricular function is normal. The right ventricle is normal  size.  No significant valvular abnormalities.  Pulmonary artery systolic pressure cannot be assessed. The PA acceleration  time is normal, implying the absence of elevated pulmonary vascular  resistance.  IVC diameter <2.1 cm collapsing >50% with sniff suggests a normal RA pressure  of 3 mmHg.  No pericardial effusion.  This study was compared with the study from 2024. No significant changes  noted.  ______________________________________________________________________________  Left Ventricle  Global and regional left ventricular function is normal with an EF of 60-65%.  Left ventricular wall thickness is normal. Left ventricular size is normal.  Left ventricular diastolic function is normal.     Right Ventricle  Global right ventricular function is normal. The right ventricle is normal  size.     Atria  Both atria appear normal.     Mitral Valve  The mitral valve is normal. Trace mitral insufficiency is present.     Aortic Valve  The aortic valve is tricuspid. On Doppler interrogation, there is no  significant stenosis or regurgitation.     Tricuspid Valve  The tricuspid valve is normal. Trace tricuspid insufficiency is present.  Pulmonary artery systolic pressure cannot be assessed.     Pulmonic Valve  The valve leaflets are not well  visualized. Trace pulmonic insufficiency is  present. The PA acceleration time is normal, implying the absence of elevated  pulmonary vascular resistance.     Vessels  The aorta root cannot be assessed. The thoracic aorta cannot be assessed. IVC  diameter <2.1 cm collapsing >50% with sniff suggests a normal RA pressure of 3  mmHg.     Pericardium  No pericardial effusion is present.     Compared to Previous Study  This study was compared with the study from 2024 . No significant  changes noted.  ______________________________________________________________________________  MMode/2D Measurements & Calculations  IVSd: 0.73 cm  LVIDd: 5.4 cm  LVPWd: 0.83 cm  LV mass(C)d: 149.6 grams  LV mass(C)dI: 83.0 grams/m2  RWT: 0.31  TAPSE: 2.7 cm     Doppler Measurements & Calculations  MV E max rigo: 100.0 cm/sec  MV A max rigo: 70.4 cm/sec  MV E/A: 1.4  MV dec slope: 521.0 cm/sec2  MV dec time: 0.19 sec  PA acc time: 0.12 sec  E/E' av.3  Lateral E/e': 5.4  Medial E/e': 9.1     ______________________________________________________________________________  Report approved by: Chava Nuñez 2024 12:06 PM           *Note: Due to a large number of results and/or encounters for the requested time period, some results have not been displayed. A complete set of results can be found in Results Review.       Discharge Medications   Discharge Medication List as of 2024  1:36 PM        START taking these medications    Details   !! ondansetron (ZOFRAN ODT) 4 MG ODT tab Take 1 tablet (4 mg) by mouth every 6 hours as needed for nausea or vomiting., Disp-10 tablet, R-0, E-Prescribe       !! - Potential duplicate medications found. Please discuss with provider.        CONTINUE these medications which have CHANGED    Details   oxyCODONE IR (ROXICODONE) 10 MG tablet Take 1 tablet (10 mg) by mouth every 4 hours as needed for severe pain or breakthrough pain. Goal 4 per day. Max 6 per day., Disp-42 tablet, R-0,  E-Prescribe           CONTINUE these medications which have NOT CHANGED    Details   albuterol (PROAIR HFA/PROVENTIL HFA/VENTOLIN HFA) 108 (90 Base) MCG/ACT inhaler Inhale 2 puffs into the lungs every 6 hours as needed for shortness of breath or wheezing, Disp-8.5 g, R-3, E-PrescribePharmacy may dispense brand covered by insurance (Proair, or proventil or ventolin or generic albuterol inhaler)      albuterol (PROVENTIL) (2.5 MG/3ML) 0.083% neb solution Take 2 vials (5 mg) by nebulization every 6 hours as needed for shortness of breath or wheezing, Disp-90 mL, R-3, E-Prescribe      aspirin (ASA) 81 MG chewable tablet Take 1 tablet (81 mg) by mouth 2 times daily, Disp-60 tablet, R-11, E-Prescribe      budesonide-formoterol (SYMBICORT) 160-4.5 MCG/ACT Inhaler Inhale 2 puffs twice daily plus 1-2 puffs as needed. May use up to 12 puffs per day., Disp-20.4 g, R-11, E-PrescribePlease dispense #2 10.2g inhalers for a 30-day supply per HONEY 2021 guidelines. If reject arises, enter DUR codes: HD / M0 / 1G. Note: MA  prefers brand Symbicort.      gabapentin (NEURONTIN) 300 MG capsule Take 1 capsule (300 mg) by mouth 3 times daily Take PO 1 pill in evening (300 mg) TID to start. If tolerated, increase by 300 mg in morning or lunch every few days, updating your doctor's office in time to get refills. The maximum dose is 900 mg TID., Di sp-90 capsule, R-1, E-Prescribe      hydroxyurea (HYDREA) 500 MG capsule TAKE 6 CAPSULES (3,000 MG) BY MOUTH ONCE DAILY., Disp-180 capsule, R-3, E-Prescribe      !! ibuprofen (ADVIL/MOTRIN) 800 MG tablet Take 800 mg by mouth every 8 hours as needed for moderate pain, Historical      methocarbamol (ROBAXIN) 750 MG tablet Take 1 tablet (750 mg) by mouth 4 times daily as needed for muscle spasms (during sickle pain crises. Okay to take scheduled while in pain), Disp-60 tablet, R-1, E-Prescribe      metoclopramide (REGLAN) 5 MG tablet Take 1 tablet (5 mg) by mouth 3 times daily as needed (Nausea),  Disp-45 tablet, R-0, Local Print      omeprazole (PRILOSEC) 20 MG DR capsule Take 1 capsule (20 mg) by mouth daily, Disp-30 capsule, R-0, E-Prescribe      !! ondansetron (ZOFRAN ODT) 8 MG ODT tab Take 1 tablet (8 mg) by mouth every 8 hours as needed for nausea, Disp-40 tablet, R-4, E-Prescribe      deferasirox (JADENU) 360 MG tablet Take 4 tablets (1,440 mg) by mouth every evening, Disp-120 tablet, R-4, E-Prescribe      acetaminophen (TYLENOL) 325 MG tablet Take 3 tablets (975 mg) by mouth every 8 hours, Disp-270 tablet, R-0, E-Prescribe      EPINEPHrine (ANY BX GENERIC EQUIV) 0.3 MG/0.3ML injection 2-pack Inject 0.3 mLs (0.3 mg) into the muscle as needed for anaphylaxis, Disp-1 each, R-1, E-Prescribe      !! ibuprofen (ADVIL/MOTRIN) 600 MG tablet Take 600 mg by mouth every 6 hours as needed for moderate pain Using rarely, 2x/week at most, Historical      melatonin 5 MG tablet Take 1 tablet (5 mg) by mouth nightly as needed for sleep, Disp-30 tablet, R-1, E-Prescribe      !! naloxone (NARCAN) 4 MG/0.1ML nasal spray Spray 4 mg into one nostril alternating nostrils as needed for opioid reversal every 2-3 minutes until assistance arrives, Disp-0.2 mL, R-0, Historical      !! naloxone (NARCAN) 4 MG/0.1ML nasal spray Spray 4 mg into one nostril alternating nostrils as needed for opioid reversal every 2-3 minutes until assistance arrives, Historical       !! - Potential duplicate medications found. Please discuss with provider.        Allergies   Allergies   Allergen Reactions    Contrast Dye Angioedema     Hives and breathing issues    Fish-Derived Products Hives    Seafood Hives    Adhesive Tape Hives     Primipore dressing causes hives    Gadolinium     Iodinated Contrast Media

## 2024-08-20 NOTE — PLAN OF CARE
"Goal Outcome Evaluation:           Overall Patient Progress: improvingOverall Patient Progress: improving       Completed Home O2 assessment today    VS: /64 (BP Location: Left arm)   Pulse 104   Temp 98.3  F (36.8  C) (Oral)   Resp 18   Ht 1.626 m (5' 4.02\")   Wt 74.9 kg (165 lb 2 oz)   LMP  (LMP Unknown)   SpO2 97%   BMI 28.33 kg/m     Denies CP and SOB   O2: O2 at 5 LPM via oxymask. Attempted to wean off pt throughout the day but O2 stats decreased.   Output: Continent   Last BM: Per pt report 8-   Activity: Up independently   Skin: Intact   Pain: Managed with PRN dilaudid and Robaxin. One time additional dose of Dilaudid given at 1400   CMS: A&Ox4, denies numbness and tingling   Dressing: N/A   Diet: Regular diet  Nausea IV Zofran administered   LDA: Single lumen port on L chest; heparin locked   Equipment: Personal items, call light   Plan: Pending, to continue POC   Additional Info:  Pt refused Hydroxyurea and Omeprazole this AM due to nausea.     Critical lab: hemoglobin 6.9    On continuous pulse ox          "

## 2024-08-20 NOTE — PROGRESS NOTES
Patient has been assessed for Home Oxygen needs. Oxygen readings:    *Pulse oximetry (SpO2) = 95% on room air at rest while awake.    *SpO2 improved to 90% on 6 liters/minute at rest.    *SpO2 = 85% on room air during activity/with exercise.    *SpO2 improved to 95% on 4 liters/minute during activity/with exercise.

## 2024-08-20 NOTE — PLAN OF CARE
Goal Outcome Evaluation:      Plan of Care Reviewed With: patient    Overall Patient Progress: improvingOverall Patient Progress: improving    VS: VSS   O2: O2 at 2 LPM via oxymask  Intermittently removing and able to tolerate to room air, sating >90%   Output: Continent   Last BM: As per pt report 8-   Activity: Up independently   Skin: Intact   Pain: Managed with PRN dilaudid   CMS: A&Ox4, denies numbness and tingling   Dressing: N/A   Diet: Regular diet   LDA: Single lumen port on L chest    Equipment: Personal items, call light   Plan: Pending, to continue POC   Additional Info:

## 2024-08-20 NOTE — PLAN OF CARE
"  VS: /84 (BP Location: Right arm)   Pulse 104   Temp 97.8  F (36.6  C) (Oral)   Resp 22   Ht 1.626 m (5' 4.02\")   Wt 74.9 kg (165 lb 2 oz)   LMP  (LMP Unknown)   SpO2 99%   BMI 28.33 kg/m     O2: O2 @ 4 LPM via oxymask, denies SOB and chest pain  Tried to wean off O2, saturation decreasing below 90%   Output: Continent   Last BM: Per pt's report 8-   Activity: Up independently   Skin: Intact   Pain: Managed with PRN dilaudid IV q2   CMS: A&Ox4, with baseline tingling on RUE   Dressing: N/A   Diet: Regular diet, denies nausea and vomiting   LDA: Single lumen port on L upper chest, heparin locked every after medication   Equipment: Personal items, call light   Plan: Pending, to continue POC   Additional Info:        "

## 2024-08-21 ENCOUNTER — APPOINTMENT (OUTPATIENT)
Dept: CT IMAGING | Facility: CLINIC | Age: 24
DRG: 811 | End: 2024-08-21
Attending: STUDENT IN AN ORGANIZED HEALTH CARE EDUCATION/TRAINING PROGRAM
Payer: COMMERCIAL

## 2024-08-21 VITALS
WEIGHT: 165.12 LBS | HEIGHT: 64 IN | RESPIRATION RATE: 16 BRPM | SYSTOLIC BLOOD PRESSURE: 121 MMHG | TEMPERATURE: 97.6 F | OXYGEN SATURATION: 94 % | DIASTOLIC BLOOD PRESSURE: 71 MMHG | BODY MASS INDEX: 28.19 KG/M2 | HEART RATE: 92 BPM

## 2024-08-21 DIAGNOSIS — E83.111 IRON OVERLOAD DUE TO REPEATED RED BLOOD CELL TRANSFUSIONS: ICD-10-CM

## 2024-08-21 LAB
ANION GAP SERPL CALCULATED.3IONS-SCNC: 8 MMOL/L (ref 7–15)
BASOPHILS # BLD AUTO: 0.2 10E3/UL (ref 0–0.2)
BASOPHILS NFR BLD AUTO: 2 %
BILIRUB SERPL-MCNC: 4.3 MG/DL
BUN SERPL-MCNC: 3.5 MG/DL (ref 6–20)
CALCIUM SERPL-MCNC: 8.6 MG/DL (ref 8.8–10.4)
CHLORIDE SERPL-SCNC: 100 MMOL/L (ref 98–107)
CREAT SERPL-MCNC: 0.53 MG/DL (ref 0.51–0.95)
EGFRCR SERPLBLD CKD-EPI 2021: >90 ML/MIN/1.73M2
EOSINOPHIL # BLD AUTO: 0.5 10E3/UL (ref 0–0.7)
EOSINOPHIL NFR BLD AUTO: 6 %
ERYTHROCYTE [DISTWIDTH] IN BLOOD BY AUTOMATED COUNT: 23 % (ref 10–15)
GLUCOSE SERPL-MCNC: 92 MG/DL (ref 70–99)
HCO3 SERPL-SCNC: 30 MMOL/L (ref 22–29)
HCT VFR BLD AUTO: 16.4 % (ref 35–47)
HGB BLD-MCNC: 9.8 G/DL (ref 11.7–15.7)
IMM GRANULOCYTES # BLD: 0 10E3/UL
IMM GRANULOCYTES NFR BLD: 1 %
LYMPHOCYTES # BLD AUTO: 2 10E3/UL (ref 0.8–5.3)
LYMPHOCYTES NFR BLD AUTO: 22 %
MCH RBC QN AUTO: 31 PG (ref 26.5–33)
MCHC RBC AUTO-ENTMCNC: 37.5 G/DL (ref 31.5–36.5)
MCV RBC AUTO: 83 FL (ref 78–100)
MONOCYTES # BLD AUTO: 1.2 10E3/UL (ref 0–1.3)
MONOCYTES NFR BLD AUTO: 14 %
NEUTROPHILS # BLD AUTO: 4.9 10E3/UL (ref 1.6–8.3)
NEUTROPHILS NFR BLD AUTO: 55 %
NRBC # BLD AUTO: 0.2 10E3/UL
NRBC BLD AUTO-RTO: 2 /100
PLATELET # BLD AUTO: 313 10E3/UL (ref 150–450)
POTASSIUM SERPL-SCNC: 3.9 MMOL/L (ref 3.4–5.3)
RBC # BLD AUTO: 1.97 10E6/UL (ref 3.8–5.2)
RETICS # AUTO: 0.27 10E6/UL (ref 0.03–0.1)
RETICS/RBC NFR AUTO: 14 % (ref 0.5–2)
SODIUM SERPL-SCNC: 138 MMOL/L (ref 135–145)
WBC # BLD AUTO: 8.8 10E3/UL (ref 4–11)

## 2024-08-21 PROCEDURE — 71250 CT THORAX DX C-: CPT

## 2024-08-21 PROCEDURE — 250N000011 HC RX IP 250 OP 636: Performed by: PHYSICIAN ASSISTANT

## 2024-08-21 PROCEDURE — 250N000011 HC RX IP 250 OP 636: Performed by: STUDENT IN AN ORGANIZED HEALTH CARE EDUCATION/TRAINING PROGRAM

## 2024-08-21 PROCEDURE — 250N000013 HC RX MED GY IP 250 OP 250 PS 637: Performed by: PHYSICIAN ASSISTANT

## 2024-08-21 PROCEDURE — 85045 AUTOMATED RETICULOCYTE COUNT: CPT | Performed by: HOSPITALIST

## 2024-08-21 PROCEDURE — 250N000011 HC RX IP 250 OP 636: Performed by: HOSPITALIST

## 2024-08-21 PROCEDURE — 82247 BILIRUBIN TOTAL: CPT | Performed by: HOSPITALIST

## 2024-08-21 PROCEDURE — 36591 DRAW BLOOD OFF VENOUS DEVICE: CPT | Performed by: HOSPITALIST

## 2024-08-21 PROCEDURE — 71250 CT THORAX DX C-: CPT | Mod: 26 | Performed by: RADIOLOGY

## 2024-08-21 PROCEDURE — 85025 COMPLETE CBC W/AUTO DIFF WBC: CPT | Performed by: HOSPITALIST

## 2024-08-21 PROCEDURE — 80048 BASIC METABOLIC PNL TOTAL CA: CPT | Performed by: HOSPITALIST

## 2024-08-21 PROCEDURE — 250N000013 HC RX MED GY IP 250 OP 250 PS 637

## 2024-08-21 RX ORDER — HEPARIN SODIUM,PORCINE 10 UNIT/ML
5-10 VIAL (ML) INTRAVENOUS EVERY 24 HOURS
Status: DISCONTINUED | OUTPATIENT
Start: 2024-08-21 | End: 2024-08-21 | Stop reason: HOSPADM

## 2024-08-21 RX ORDER — OXYCODONE HYDROCHLORIDE 10 MG/1
10 TABLET ORAL EVERY 4 HOURS PRN
Qty: 42 TABLET | Refills: 0 | Status: SHIPPED | OUTPATIENT
Start: 2024-08-21 | End: 2024-09-03

## 2024-08-21 RX ORDER — HEPARIN SODIUM,PORCINE 10 UNIT/ML
5-10 VIAL (ML) INTRAVENOUS
Status: DISCONTINUED | OUTPATIENT
Start: 2024-08-21 | End: 2024-08-21 | Stop reason: HOSPADM

## 2024-08-21 RX ORDER — ONDANSETRON 4 MG/1
4 TABLET, ORALLY DISINTEGRATING ORAL EVERY 6 HOURS PRN
Qty: 10 TABLET | Refills: 0 | Status: SHIPPED | OUTPATIENT
Start: 2024-08-21

## 2024-08-21 RX ORDER — HEPARIN SODIUM (PORCINE) LOCK FLUSH IV SOLN 100 UNIT/ML 100 UNIT/ML
5-10 SOLUTION INTRAVENOUS
Status: DISCONTINUED | OUTPATIENT
Start: 2024-08-21 | End: 2024-08-21 | Stop reason: HOSPADM

## 2024-08-21 RX ADMIN — Medication 5 ML: at 03:31

## 2024-08-21 RX ADMIN — HYDROMORPHONE HYDROCHLORIDE 1 MG: 1 INJECTION, SOLUTION INTRAMUSCULAR; INTRAVENOUS; SUBCUTANEOUS at 05:38

## 2024-08-21 RX ADMIN — ONDANSETRON 4 MG: 2 INJECTION INTRAMUSCULAR; INTRAVENOUS at 08:12

## 2024-08-21 RX ADMIN — Medication 5 ML: at 00:51

## 2024-08-21 RX ADMIN — HYDROMORPHONE HYDROCHLORIDE 1 MG: 1 INJECTION, SOLUTION INTRAMUSCULAR; INTRAVENOUS; SUBCUTANEOUS at 08:11

## 2024-08-21 RX ADMIN — ACETAMINOPHEN 975 MG: 325 TABLET ORAL at 03:31

## 2024-08-21 RX ADMIN — GABAPENTIN 300 MG: 300 CAPSULE ORAL at 10:36

## 2024-08-21 RX ADMIN — HYDROMORPHONE HYDROCHLORIDE 1 MG: 1 INJECTION, SOLUTION INTRAMUSCULAR; INTRAVENOUS; SUBCUTANEOUS at 03:31

## 2024-08-21 RX ADMIN — ENOXAPARIN SODIUM 40 MG: 40 INJECTION SUBCUTANEOUS at 10:34

## 2024-08-21 RX ADMIN — HEPARIN, PORCINE (PF) 10 UNIT/ML INTRAVENOUS SYRINGE 5 ML: at 08:12

## 2024-08-21 RX ADMIN — SODIUM CHLORIDE, PRESERVATIVE FREE 5 ML: 5 INJECTION INTRAVENOUS at 14:06

## 2024-08-21 RX ADMIN — Medication 5 ML: at 05:41

## 2024-08-21 RX ADMIN — ALBUTEROL SULFATE 2 PUFF: 90 AEROSOL, METERED RESPIRATORY (INHALATION) at 13:59

## 2024-08-21 RX ADMIN — HYDROMORPHONE HYDROCHLORIDE 1 MG: 1 INJECTION, SOLUTION INTRAMUSCULAR; INTRAVENOUS; SUBCUTANEOUS at 00:47

## 2024-08-21 RX ADMIN — HYDROMORPHONE HYDROCHLORIDE 1 MG: 1 INJECTION, SOLUTION INTRAMUSCULAR; INTRAVENOUS; SUBCUTANEOUS at 10:37

## 2024-08-21 RX ADMIN — HYDROMORPHONE HYDROCHLORIDE 1 MG: 1 INJECTION, SOLUTION INTRAMUSCULAR; INTRAVENOUS; SUBCUTANEOUS at 12:29

## 2024-08-21 RX ADMIN — Medication 5 ML: at 12:31

## 2024-08-21 RX ADMIN — FLUTICASONE FUROATE AND VILANTEROL TRIFENATATE 1 PUFF: 100; 25 POWDER RESPIRATORY (INHALATION) at 10:35

## 2024-08-21 RX ADMIN — ACETAMINOPHEN 975 MG: 325 TABLET ORAL at 10:36

## 2024-08-21 ASSESSMENT — ACTIVITIES OF DAILY LIVING (ADL)
ADLS_ACUITY_SCORE: 23

## 2024-08-21 NOTE — PLAN OF CARE
"Goal Outcome Evaluation:           Overall Patient Progress: no changeOverall Patient Progress: no change         VS: /71 (BP Location: Left arm)   Pulse 92   Temp 97.6  F (36.4  C) (Oral)   Resp 16   Ht 1.626 m (5' 4.02\")   Wt 74.9 kg (165 lb 2 oz)   LMP  (LMP Unknown)   SpO2 94%   BMI 28.33 kg/m      O2: O2 @ 5 LPM via oxymask, denies SOB and chest pain  Tried to wean off O2, saturation decreasing below 90%    Output: Continent   Last BM: Per pt's report 8-   Activity: Up independently   Skin: Intact   Pain: Managed with PRN meds   CMS: A&Ox4, with baseline tingling on RUE   Dressing: N/A   Diet: Regular diet  Nausea. IV zofran administered.    LDA: PORT DEACESSED   Equipment: Personal items, call light   Plan: Pt to follow discharge instructions   Additional Info: Pt refused am meds due to nausea. Pt declined compazine.      On continuous pulse O2. Pt at times taking off O2 and continuous pulse O2 per her choice. This writer educated the importance of keeping continuous pulse O2 and oxygen on but pt continued to refuse.                  Set up pts home O2. This writer advised pt to wait for home O2 to arrive at hospital but pt refused. Pt states she received phone call from home O2 and they will be delivering to her home. This writer again advised pt to stay but pt refused.       DISCHARGE SUMMARY    Pt discharging to: home  Transportation: medical ride  AVS given and discussed: Pt was given AVS and pt states understanding of content. Pt has no further questions.   Stoplight Tool given and discussed: Yes, no further questions.  Medications given: Yes, discussed. No further questions.   Belongings returned: Yes, ensured all belongings packed and sent with pt. No items in security.   Comments: Escorted safely to elevators.         "

## 2024-08-21 NOTE — PROGRESS NOTES
Oxygen Documentation  I certify that this patient, Jennifer Cervantes has been under my care (or a nurse practitioner or physican's assistant working with me). This is the face-to-face encounter for oxygen medical necessity.      At the time of this encounter, I have reviewed the qualifying testing and have determined that supplemental oxygen is reasonable and necessary and is expected to improve the patient's condition in a home setting.         Patient has continued oxygen desaturation due to Chronic Respiratory Failure with Hypoxia J96.11.    If portability is ordered, is the patient mobile within the home? yes    Was this visit performed as a telehealth visit: No    Estee Winn MD  Hospitalist

## 2024-08-21 NOTE — PROGRESS NOTES
Pt has RUL expiratory wheezes, ALFA and LLL inspiratory and expiratory wheezes. Medicine MD paged.

## 2024-08-21 NOTE — PLAN OF CARE
"Goal Outcome Evaluation:      Plan of Care Reviewed With: patient    Overall Patient Progress: no changeOverall Patient Progress: no change    Outcome Evaluation: See progress note.    End of Shift Summary [id number]:    VS: /69 (BP Location: Right arm)   Pulse 111   Temp 98.3  F (36.8  C) (Oral)   Resp 18   Ht 1.626 m (5' 4.02\")   Wt 74.9 kg (165 lb 2 oz)   LMP  (LMP Unknown)   SpO2 94%   BMI 28.33 kg/m     O2: Sats maintained on 4LPM Oxymask. Denies CP, SOB. Home O2 assessment pending.   Output: Voids spontaneously without difficulty in bathroom.   Last BM: 8/19 per report.   Activity: Independent in room to bathroom.    Skin:    Pain: Pt reports severe pain in BLE, receiving IV dilaudid, scheduled Tylenol.   CMS: RUE tingling at baseline. A/Ox4.    Diet: Regular adult diet. Denies N/V symptoms.   LDA: (L) Single lumen port a cath, heparin locked.   Equipment: Personal belongings   Plan: Dsicharge home when medically ready   Additional Info: Call light within reach, able to make needs known. No acute events overnight, continue POC.       "

## 2024-08-22 ENCOUNTER — INFUSION THERAPY VISIT (OUTPATIENT)
Dept: TRANSPLANT | Facility: CLINIC | Age: 24
End: 2024-08-22
Attending: PEDIATRICS
Payer: COMMERCIAL

## 2024-08-22 ENCOUNTER — NURSE TRIAGE (OUTPATIENT)
Dept: ONCOLOGY | Facility: CLINIC | Age: 24
End: 2024-08-22
Payer: COMMERCIAL

## 2024-08-22 ENCOUNTER — PATIENT OUTREACH (OUTPATIENT)
Dept: CARE COORDINATION | Facility: CLINIC | Age: 24
End: 2024-08-22

## 2024-08-22 VITALS
TEMPERATURE: 98.3 F | SYSTOLIC BLOOD PRESSURE: 123 MMHG | RESPIRATION RATE: 18 BRPM | DIASTOLIC BLOOD PRESSURE: 74 MMHG | OXYGEN SATURATION: 82 % | HEART RATE: 105 BPM

## 2024-08-22 DIAGNOSIS — G81.10 SPASTIC HEMIPLEGIA, UNSPECIFIED ETIOLOGY, UNSPECIFIED LATERALITY (H): ICD-10-CM

## 2024-08-22 DIAGNOSIS — D57.00 SICKLE CELL PAIN CRISIS (H): Primary | ICD-10-CM

## 2024-08-22 LAB
BASOPHILS # BLD AUTO: ABNORMAL 10*3/UL
BASOPHILS NFR BLD AUTO: ABNORMAL %
EOSINOPHIL # BLD AUTO: ABNORMAL 10*3/UL
EOSINOPHIL NFR BLD AUTO: ABNORMAL %
ERYTHROCYTE [DISTWIDTH] IN BLOOD BY AUTOMATED COUNT: 24.8 % (ref 10–15)
HCT VFR BLD AUTO: 17.5 % (ref 35–47)
HGB BLD-MCNC: 6.7 G/DL (ref 11.7–15.7)
IMM GRANULOCYTES # BLD: ABNORMAL 10*3/UL
IMM GRANULOCYTES NFR BLD: ABNORMAL %
LYMPHOCYTES # BLD AUTO: ABNORMAL 10*3/UL
LYMPHOCYTES NFR BLD AUTO: ABNORMAL %
MCH RBC QN AUTO: 30.7 PG (ref 26.5–33)
MCHC RBC AUTO-ENTMCNC: 37.1 G/DL (ref 31.5–36.5)
MCV RBC AUTO: 83 FL (ref 78–100)
MONOCYTES # BLD AUTO: ABNORMAL 10*3/UL
MONOCYTES NFR BLD AUTO: ABNORMAL %
NEUTROPHILS # BLD AUTO: ABNORMAL 10*3/UL
NEUTROPHILS NFR BLD AUTO: ABNORMAL %
NRBC # BLD AUTO: 0.4 10E3/UL
NRBC BLD AUTO-RTO: 3 /100
PLATELET # BLD AUTO: 377 10E3/UL (ref 150–450)
RBC # BLD AUTO: 2.12 10E6/UL (ref 3.8–5.2)
RETICS # AUTO: 0.34 10E6/UL (ref 0.03–0.1)
RETICS/RBC NFR AUTO: 15.3 % (ref 0.5–2)
WBC # BLD AUTO: 12.5 10E3/UL (ref 4–11)

## 2024-08-22 PROCEDURE — 85045 AUTOMATED RETICULOCYTE COUNT: CPT | Performed by: REGISTERED NURSE

## 2024-08-22 PROCEDURE — 250N000011 HC RX IP 250 OP 636: Performed by: PEDIATRICS

## 2024-08-22 PROCEDURE — 96375 TX/PRO/DX INJ NEW DRUG ADDON: CPT

## 2024-08-22 PROCEDURE — 36591 DRAW BLOOD OFF VENOUS DEVICE: CPT | Performed by: REGISTERED NURSE

## 2024-08-22 PROCEDURE — 96374 THER/PROPH/DIAG INJ IV PUSH: CPT

## 2024-08-22 PROCEDURE — 96361 HYDRATE IV INFUSION ADD-ON: CPT

## 2024-08-22 PROCEDURE — 96376 TX/PRO/DX INJ SAME DRUG ADON: CPT

## 2024-08-22 PROCEDURE — 258N000003 HC RX IP 258 OP 636: Performed by: PEDIATRICS

## 2024-08-22 PROCEDURE — 85014 HEMATOCRIT: CPT | Performed by: REGISTERED NURSE

## 2024-08-22 PROCEDURE — 250N000013 HC RX MED GY IP 250 OP 250 PS 637: Performed by: PEDIATRICS

## 2024-08-22 RX ORDER — DIPHENHYDRAMINE HCL 25 MG
50 CAPSULE ORAL
Status: CANCELLED
Start: 2024-10-01

## 2024-08-22 RX ORDER — ONDANSETRON 2 MG/ML
8 INJECTION INTRAMUSCULAR; INTRAVENOUS EVERY 6 HOURS PRN
Status: CANCELLED
Start: 2024-10-01

## 2024-08-22 RX ORDER — KETOROLAC TROMETHAMINE 30 MG/ML
30 INJECTION, SOLUTION INTRAMUSCULAR; INTRAVENOUS ONCE
Status: COMPLETED | OUTPATIENT
Start: 2024-08-22 | End: 2024-08-22

## 2024-08-22 RX ORDER — ONDANSETRON 4 MG/1
8 TABLET, FILM COATED ORAL
Status: CANCELLED
Start: 2024-10-01

## 2024-08-22 RX ORDER — KETOROLAC TROMETHAMINE 30 MG/ML
30 INJECTION, SOLUTION INTRAMUSCULAR; INTRAVENOUS ONCE
Status: CANCELLED
Start: 2024-10-01 | End: 2024-10-01

## 2024-08-22 RX ORDER — HEPARIN SODIUM (PORCINE) LOCK FLUSH IV SOLN 100 UNIT/ML 100 UNIT/ML
5 SOLUTION INTRAVENOUS
Status: CANCELLED | OUTPATIENT
Start: 2024-10-01

## 2024-08-22 RX ORDER — HEPARIN SODIUM (PORCINE) LOCK FLUSH IV SOLN 100 UNIT/ML 100 UNIT/ML
5 SOLUTION INTRAVENOUS
Status: DISCONTINUED | OUTPATIENT
Start: 2024-08-22 | End: 2024-08-22 | Stop reason: HOSPADM

## 2024-08-22 RX ORDER — DIPHENHYDRAMINE HCL 25 MG
50 CAPSULE ORAL
Status: COMPLETED | OUTPATIENT
Start: 2024-08-22 | End: 2024-08-22

## 2024-08-22 RX ORDER — DEFERASIROX 360 MG/1
1440 TABLET, FILM COATED ORAL EVERY EVENING
Qty: 120 TABLET | Refills: 1 | Status: SHIPPED | OUTPATIENT
Start: 2024-08-22

## 2024-08-22 RX ORDER — HEPARIN SODIUM,PORCINE 10 UNIT/ML
5 VIAL (ML) INTRAVENOUS
Status: CANCELLED | OUTPATIENT
Start: 2024-10-01

## 2024-08-22 RX ADMIN — SODIUM CHLORIDE, POTASSIUM CHLORIDE, SODIUM LACTATE AND CALCIUM CHLORIDE 1000 ML: 600; 310; 30; 20 INJECTION, SOLUTION INTRAVENOUS at 08:58

## 2024-08-22 RX ADMIN — HYDROMORPHONE HYDROCHLORIDE 1 MG: 1 INJECTION, SOLUTION INTRAMUSCULAR; INTRAVENOUS; SUBCUTANEOUS at 10:05

## 2024-08-22 RX ADMIN — Medication 5 ML: at 14:30

## 2024-08-22 RX ADMIN — DIPHENHYDRAMINE HYDROCHLORIDE 50 MG: 25 CAPSULE ORAL at 09:03

## 2024-08-22 RX ADMIN — HYDROMORPHONE HYDROCHLORIDE 1 MG: 1 INJECTION, SOLUTION INTRAMUSCULAR; INTRAVENOUS; SUBCUTANEOUS at 11:05

## 2024-08-22 RX ADMIN — HYDROMORPHONE HYDROCHLORIDE 1 MG: 1 INJECTION, SOLUTION INTRAMUSCULAR; INTRAVENOUS; SUBCUTANEOUS at 09:04

## 2024-08-22 RX ADMIN — KETOROLAC TROMETHAMINE 30 MG: 30 INJECTION, SOLUTION INTRAMUSCULAR; INTRAVENOUS at 09:03

## 2024-08-22 NOTE — TELEPHONE ENCOUNTER
Oncology Nurse Triage - Sickle Cell Pain Crisis:    Situation: Jennifer  calling about Sickle Cell Pain Crisis, requesting to be added on for IV fluids and pain medicine    Background:     Patient's last infusion was 8/15/24   Last clinic visit date:8/15/24 was just discharged from hospital yesterday   Does patient have active treatment plan?  Yes      Assessment of Symptoms:  Onset/Duration of symptoms: 1 day    Is it typical sickle cell pain? Yes   Location: legs   Character: Sharp           Intensity: 9/10    Any radiation of pain, numbness, tingling, weakness, warmth, swelling, discoloration of arms or legs?No     Fever?No  (if yes max temperature recorded in last 24 hours):      Chest Pain Present: No     Shortness of breath: No     Other home therapies tried: HEAT/HEATING PAD and WARM BATH     Last home medication taken and when: oxycodone last night before bed and ibuprofen just now     Any Refills Needed?: No     Does patient have transportation & length of time to get to clinic: Yes it will be 10 mins away.         Recommendations:     Placed on infusion call list     If you do not hear from the infusion center by 2pm then you will not be able to get in for an infusion today. If symptoms worsen while waiting for call back, and/or you experience fever, chills, SOB, chest pain, cough, n/v, dizziness, numbness, swelling, discoloration of extremities, then seek emergency evaluation in Emergency Department.     Please note, if you are late for your appt, you risk losing your infusion appt as it may delay another patient's infusion who arrived on time.

## 2024-08-22 NOTE — TELEPHONE ENCOUNTER
Jennifer is calling to say she has about 1 hour before infusion apt is complete.  She now needs transportation home.   Requesting SW assist with arranging.   Message sent to SW to assist with arranging transportation.

## 2024-08-22 NOTE — TELEPHONE ENCOUNTER
"Deferasirox 360mg tablet  Last prescribing provider: Patricia Mantilla    Last clinic visit date: 8/15/24    Recommendations for requested medication (if none, N/A): 8/15/24, \"Continue Jadenu and deferiprone for iron overload.\"    Any other pertinent information (if none, N/A): NA    Refilled: Y/N, if NO, why?    "

## 2024-08-22 NOTE — PROGRESS NOTES
Ambulatory Care Management- Transportation Support  Oncology Clinic     Data/Intervention:  Patient Name: Jennifer Cervantes    /Age: 2000 (24 year old)     [facility] team member collaborated with following Oncology SW regarding this request: Abdullahi           Assessment:  CHW/CTA called [employer] to arrange medical appointment transportation. [employer] arranged  for patient from clinic to home.       Plan:  Patient is aware of the transportation plan.  available to assist with any other needs.      KHALIDA Mejía

## 2024-08-22 NOTE — PROGRESS NOTES
Connected Care Resource Center: [facility]    Background: Transitional Care Management program identified per system criteria and reviewed by [facility] team for possible outreach.    Assessment: Upon chart review, CCR Team member will not proceed with patient outreach related to this episode of Transitional Care Management program due to reason below:    Patient has active communication with a nurse, provider or care team for reason of post-hospital follow up plan.  Outreach call by CCR[facility] team not indicated to minimize duplicative efforts.     Plan: Transitional Care Management episode addressed appropriately per reason noted above.      KHALIDA Kinsey  [facility], [facility]    *Connected Care Resource Team does NOT follow patient ongoing. Referrals are identified based on internal discharge reports and the outreach is to ensure patient has an understanding of their discharge instructions.

## 2024-08-22 NOTE — PROGRESS NOTES
Chief Complaint   Patient presents with   • Sinusitis     x 1 week and states she has been having raspy voice, headache, back pain and jaw pain         Cough  This is a new problem. The current episode started 7 days ago. The problem has been gradually worsening. The problem occurs constantly. The cough is productive of  sputum. Associated symptoms include : headaches,  nasal congestion, nasal discharge.    Pt reports more fatigue than usual.     No objective fevers at home.  + voice changes.  + upper teeth hurt.    States cough is making it hard to sleep at night.   Pertinent negatives include no   nausea, vomiting, diarrhea, sweats, weight loss or wheezing. Nothing aggravates the symptoms.  Patient has tried several OTC cold products for the symptoms - no improvement.  pt does not have a hx of frequent sinusitis     Past Medical History:   Diagnosis Date   • Depression    • Obesity          Social History     Tobacco Use   • Smoking status: Former Smoker     Packs/day: 0.25     Years: 25.00     Pack years: 6.25     Types: Cigarettes     Last attempt to quit: 2002     Years since quittin.4   • Smokeless tobacco: Never Used   Substance Use Topics   • Alcohol use: Yes     Comment: occ   • Drug use: Yes     Types: Marijuana     Comment: smoking and edibles daily for pain         Family history was reviewed and not pertinent           Review of Systems   Constitutional: Negative for fever, chills and weight loss.   HENT - denies  ear pain.   Positive congestion, sore throat  Eyes: denies vision changes, discharge  Respiratory: Negative for hemoptysis and wheezing.    Cardiovascular: Negative for chest pain or PND.   Gastrointestinal:  No abdominal pain,  nausea, vomiting, diarrhea.  Negative for  blood in stool.    - no discharge, dysuria, frequency.      Neurological: Negative for dizziness.   + headaches.   musculoskeletal - denies myalgias, calf pain  Psych - denies anxiety/depression/mood  Infusion Nursing Note:  Jennifer Cervantes presents today for IVF and pain meds.    Patient seen by provider today: No   present during visit today: Not Applicable.    Note: Lab results monitored. Upon arrival pt sats were 76-80%, pt reports feeling fatigued with a 9/10 pain in her legs. 2L NC given to pt, sats 91-97%.  Pt received 1L LR, x3 doses of dilaudid, x1 dose of Toradol and 40 mg of PO Benadryl. All infusions tolerated well.   Upon completion of infusions, pain rated 7/10 and O2 sats 80-82% on RA.   Dr. Duncan and Patricia Mantilla updated on pt condition and recommended a blood transfusion. Pt updated on plan going forward and educated on s/s of when to go to the ED. Pt verbalized understanding, expressed she will use home O2, and discharged home.       Intravenous Access:  Implanted Port.    Treatment Conditions:  Lab Results   Component Value Date    HGB 6.7 (LL) 08/22/2024    WBC 12.5 (H) 08/22/2024    ANEU 11.2 (H) 08/19/2024    ANEUTAUTO 4.9 08/21/2024     08/22/2024        Results reviewed, labs MET treatment parameters, ok to proceed with treatment.      Post Infusion Assessment:  Patient tolerated infusion without incident.  Blood return noted pre and post infusion.       Discharge Plan:   Patient and/or family verbalized understanding of discharge instructions and all questions answered.      Radha Galindo RN     "changes.  Skin: no itching or rash  All other systems reviewed and are negative.             Objective:     Blood pressure 130/90, pulse 64, temperature 36.2 °C (97.2 °F), resp. rate 16, height 1.651 m (5' 5\"), weight 80.3 kg (177 lb), SpO2 97 %.    Physical Exam   Constitutional: patient is oriented to person, place, and time. Patient appears well-developed and well-nourished. No distress.   HENT:   Head: Normocephalic and atraumatic.   Right Ear: External ear normal.   Left Ear: External ear normal.   TMs both normal  Nose: Mucosal edema  present.   There is tenderness to percussion over left and right sinuses  Mouth/Throat: Mucous membranes are normal. No oral lesions.  There is posterior pharyngeal erythema.  No oropharyngeal exudate or posterior oropharyngeal edema.   Eyes: Conjunctivae and EOM are normal. Pupils are equal, round, and reactive to light. Right eye exhibits no discharge. Left eye exhibits no discharge. No scleral icterus.   Neck: Normal range of motion. Neck supple. No tracheal deviation present.   Cardiovascular: Normal rate, regular rhythm and normal heart sounds.  Exam reveals no friction rub.    Pulmonary/Chest: Effort normal. No respiratory distress. Patient has no wheezes or rhonchi. Patient has no rales.    Musculoskeletal:  exhibits no edema.   Lymphadenopathy:     Patient has no  cervical adenopathy.      Neurological: patient is alert and oriented to person, place, and time.   CN 2-12 intact  Skin: Skin is warm and dry. No rash noted. No erythema.   Psychiatric: patient  has a normal mood and affect.  behavior is normal.   Nursing note and vitals reviewed.              Assessment/Plan:       1. Acute maxillary sinusitis, recurrence not specified     - amoxicillin-clavulanate (AUGMENTIN) 875-125 MG Tab; Take 1 Tab by mouth 2 times a day for 7 days.  Dispense: 14 Tab; Refill: 0       Follow up in one week if no improvement, sooner if symptoms worsen.     "

## 2024-08-22 NOTE — TELEPHONE ENCOUNTER
BMT can take Jennifer at 8:30     Call placed to Jennifer and she can make the 8:30 appt time. Confirmed with Elsa that she did not need transportation. She said no need for transportation assistance today.     Message sent to CCOD to schedule

## 2024-08-23 ENCOUNTER — NURSE TRIAGE (OUTPATIENT)
Dept: ONCOLOGY | Facility: CLINIC | Age: 24
End: 2024-08-23
Payer: COMMERCIAL

## 2024-08-23 ENCOUNTER — INFUSION THERAPY VISIT (OUTPATIENT)
Dept: INFUSION THERAPY | Facility: CLINIC | Age: 24
End: 2024-08-23
Attending: PEDIATRICS
Payer: COMMERCIAL

## 2024-08-23 ENCOUNTER — PATIENT OUTREACH (OUTPATIENT)
Dept: CARE COORDINATION | Facility: CLINIC | Age: 24
End: 2024-08-23
Payer: COMMERCIAL

## 2024-08-23 VITALS
RESPIRATION RATE: 18 BRPM | OXYGEN SATURATION: 95 % | DIASTOLIC BLOOD PRESSURE: 82 MMHG | SYSTOLIC BLOOD PRESSURE: 130 MMHG | TEMPERATURE: 97.9 F | HEART RATE: 96 BPM

## 2024-08-23 DIAGNOSIS — D57.00 SICKLE CELL PAIN CRISIS (H): Primary | ICD-10-CM

## 2024-08-23 DIAGNOSIS — G81.10 SPASTIC HEMIPLEGIA, UNSPECIFIED ETIOLOGY, UNSPECIFIED LATERALITY (H): ICD-10-CM

## 2024-08-23 PROCEDURE — 96374 THER/PROPH/DIAG INJ IV PUSH: CPT

## 2024-08-23 PROCEDURE — 250N000011 HC RX IP 250 OP 636: Performed by: PEDIATRICS

## 2024-08-23 PROCEDURE — 96376 TX/PRO/DX INJ SAME DRUG ADON: CPT

## 2024-08-23 PROCEDURE — 96375 TX/PRO/DX INJ NEW DRUG ADDON: CPT

## 2024-08-23 PROCEDURE — 250N000013 HC RX MED GY IP 250 OP 250 PS 637: Performed by: PEDIATRICS

## 2024-08-23 PROCEDURE — 96361 HYDRATE IV INFUSION ADD-ON: CPT

## 2024-08-23 PROCEDURE — 258N000003 HC RX IP 258 OP 636: Performed by: PEDIATRICS

## 2024-08-23 RX ORDER — HEPARIN SODIUM (PORCINE) LOCK FLUSH IV SOLN 100 UNIT/ML 100 UNIT/ML
5 SOLUTION INTRAVENOUS
Status: CANCELLED | OUTPATIENT
Start: 2024-10-01

## 2024-08-23 RX ORDER — ONDANSETRON 2 MG/ML
8 INJECTION INTRAMUSCULAR; INTRAVENOUS EVERY 6 HOURS PRN
Status: DISCONTINUED | OUTPATIENT
Start: 2024-08-23 | End: 2024-08-23 | Stop reason: HOSPADM

## 2024-08-23 RX ORDER — ONDANSETRON 2 MG/ML
8 INJECTION INTRAMUSCULAR; INTRAVENOUS EVERY 6 HOURS PRN
Status: CANCELLED
Start: 2024-10-01

## 2024-08-23 RX ORDER — DIPHENHYDRAMINE HCL 25 MG
50 CAPSULE ORAL
Status: COMPLETED | OUTPATIENT
Start: 2024-08-23 | End: 2024-08-23

## 2024-08-23 RX ORDER — HEPARIN SODIUM (PORCINE) LOCK FLUSH IV SOLN 100 UNIT/ML 100 UNIT/ML
5 SOLUTION INTRAVENOUS
Status: DISCONTINUED | OUTPATIENT
Start: 2024-08-23 | End: 2024-08-23 | Stop reason: HOSPADM

## 2024-08-23 RX ORDER — KETOROLAC TROMETHAMINE 30 MG/ML
30 INJECTION, SOLUTION INTRAMUSCULAR; INTRAVENOUS ONCE
Status: CANCELLED
Start: 2024-10-01 | End: 2024-10-01

## 2024-08-23 RX ORDER — HEPARIN SODIUM,PORCINE 10 UNIT/ML
5 VIAL (ML) INTRAVENOUS
Status: CANCELLED | OUTPATIENT
Start: 2024-10-01

## 2024-08-23 RX ORDER — DIPHENHYDRAMINE HCL 25 MG
50 CAPSULE ORAL
Status: CANCELLED
Start: 2024-10-01

## 2024-08-23 RX ORDER — ONDANSETRON 4 MG/1
8 TABLET, FILM COATED ORAL
Status: CANCELLED
Start: 2024-10-01

## 2024-08-23 RX ORDER — KETOROLAC TROMETHAMINE 30 MG/ML
30 INJECTION, SOLUTION INTRAMUSCULAR; INTRAVENOUS ONCE
Status: COMPLETED | OUTPATIENT
Start: 2024-08-23 | End: 2024-08-23

## 2024-08-23 RX ADMIN — KETOROLAC TROMETHAMINE 30 MG: 30 INJECTION, SOLUTION INTRAMUSCULAR; INTRAVENOUS at 12:09

## 2024-08-23 RX ADMIN — HYDROMORPHONE HYDROCHLORIDE 1 MG: 1 INJECTION, SOLUTION INTRAMUSCULAR; INTRAVENOUS; SUBCUTANEOUS at 14:04

## 2024-08-23 RX ADMIN — Medication 5 ML: at 14:22

## 2024-08-23 RX ADMIN — SODIUM CHLORIDE, POTASSIUM CHLORIDE, SODIUM LACTATE AND CALCIUM CHLORIDE 1000 ML: 600; 310; 30; 20 INJECTION, SOLUTION INTRAVENOUS at 12:09

## 2024-08-23 RX ADMIN — HYDROMORPHONE HYDROCHLORIDE 1 MG: 1 INJECTION, SOLUTION INTRAMUSCULAR; INTRAVENOUS; SUBCUTANEOUS at 12:10

## 2024-08-23 RX ADMIN — ONDANSETRON 8 MG: 2 INJECTION INTRAMUSCULAR; INTRAVENOUS at 12:09

## 2024-08-23 RX ADMIN — DIPHENHYDRAMINE HYDROCHLORIDE 50 MG: 25 CAPSULE ORAL at 12:09

## 2024-08-23 RX ADMIN — HYDROMORPHONE HYDROCHLORIDE 1 MG: 1 INJECTION, SOLUTION INTRAMUSCULAR; INTRAVENOUS; SUBCUTANEOUS at 13:09

## 2024-08-23 NOTE — TELEPHONE ENCOUNTER
Call from pt, who states she has 1130 infusion appt today and is running about 10 minutes late. She will be here by 1140.   [facility] Infusion updated via chat.

## 2024-08-23 NOTE — PROGRESS NOTES
Ambulatory Care Management- Transportation Support  Oncology Clinic     Data/Intervention:  Patient Name: Jennifer Cervantes     /Age: 2000 (24 year old)     C[facility] team member collaborated with following Oncology SW regarding this request: Abdullahi           Assessment:  CHW/CTA called [employer] to arrange medical appointment transportation. [employer] arranged  for patient from home as well as return ride from the clinic to home.       Plan:  Patient is aware of the transportation plan.  available to assist with any other needs.      KHALIDA Mejía

## 2024-08-23 NOTE — PATIENT INSTRUCTIONS
Dear Jennifer Cervantes    Thank you for choosing [facility] ([facility] for your infusion.  The following information is a summary of our appointment as well as important reminders.        If you are a transplant patient and require transplant education, please click on this link: [email or web address].    If you have any questions on your upcoming Specialty Infusion appointments, please call scheduling at [phone].  It was a pleasure taking care of you today.    Sincerely,    [facility]  [address] [place] [address]  [id number]  Phone:  [phone]

## 2024-08-23 NOTE — TELEPHONE ENCOUNTER
Oncology Nurse Triage - Sickle Cell Pain Crisis:    Situation: Jennifer  calling about Sickle Cell Pain Crisis, requesting to be added on for IV fluids and pain medicine    Background:     Patient's last infusion was 8/22/2024. Was still hurting  Last clinic visit date:8/15/2024 Patricia Mantilla  Does patient have active treatment plan?  Yes      Assessment of Symptoms:  Onset/Duration of symptoms: 2 day    Is it typical sickle cell pain? Yes   Location: legs  Character: Sharp           Intensity: 9/10    Any radiation of pain, numbness, tingling, weakness, warmth, swelling, discoloration of arms or legs?No     Fever?No  (if yes max temperature recorded in last 24 hours):      Chest Pain Present: No     Shortness of breath: No     Other home therapies tried: HEAT/HEATING PAD and WARM BATH     Last home medication taken and when: Oxycodone last night    Any Refills Needed?: No     Does patient have transportation & length of time to get to clinic: Yes   Could get a ride to clinic, but would need a ride home.       Recommendations:   Meets protocol    0852 Offer for 11:30am [facility] for IVF/Pain appt today. Pt in agreement with appt time. Could use assistance with a ride since appt later in day    0854  notified to assist with scheduling.    0858 Scheduling request sent to add onto [facility] schedule today.     Please note, if you are late for your appt, you risk losing your infusion appt as it may delay another patient's infusion who arrived on time.

## 2024-08-23 NOTE — PROGRESS NOTES
"Infusion Nursing Note:  Jennifer Cervantes presents today for IVF/pain meds.    Patient seen by provider today: No   present during visit today: Not Applicable.    Note: Pt arrived to infusion feeling well, denies fever/chills, chest pain/cough/SOB, O2 sats stable on 2L, denies N/V/D/C, skin breakdown or urinary symptoms.  Pain is \"9\" on arrival and \"6\" and \"getting better\" at the end of infusion after interventions.      Intravenous Access:  Implanted Port.    Treatment Conditions:  Not Applicable.      Post Infusion Assessment:  Patient tolerated infusion without incident.  Blood return noted pre and post infusion.  Site patent and intact, free from redness, edema or discomfort.  No evidence of extravasations.  Access discontinued per protocol.       Discharge Plan:   Discharge instructions reviewed with: Patient.  Patient and/or family verbalized understanding of discharge instructions and all questions answered.  AVS to patient via [employer].  Patient will return 08/29 for next appointment.   Patient discharged in stable condition accompanied by: self.  Departure Mode: Ambulatory.    Administrations This Visit       diphenhydrAMINE (BENADRYL) capsule 50 mg       Admin Date  08/23/2024 Action  $Given Dose  50 mg Route  Oral Documented By  Deanna Tinoco RN              heparin lock flush 100 unit/mL injection 5 mL       Admin Date  08/23/2024 Action  $Given Dose  5 mL Route  Intracatheter Documented By  Rin Mckeon RN              HYDROmorphone (DILAUDID) injection 1 mg       Admin Date  08/23/2024 Action  $Given Dose  1 mg Route  Intravenous Documented By  Deanna Tinoco RN               Admin Date  08/23/2024 Action  $Given Dose  1 mg Route  Intravenous Documented By  Deanna Tinoco RN               Admin Date  08/23/2024 Action  $Given Dose  1 mg Route  Intravenous Documented By  Deanna Tinoco RN              ketorolac (TORADOL) injection 30 mg       Admin Date  08/23/2024 " Action  $Given Dose  30 mg Route  Intravenous Documented By  Deanna Tinoco RN              lactated ringers BOLUS 1,000 mL       Admin Date  08/23/2024 Action  $New Bag Dose  1,000 mL Rate  500 mL/hr Route  Intravenous Documented By  Deanna Tinoco RN              ondansetron (ZOFRAN) injection 8 mg       Admin Date  08/23/2024 Action  $Given Dose  8 mg Route  Intravenous Documented By  Deanna Tinoco RN              sodium chloride (PF) 0.9% PF flush 3-20 mL       Admin Date  08/23/2024 Action  $Given Dose  20 mL Route  Intracatheter Documented By  Rin Mckeon RN Carissa L. Draper, RN

## 2024-08-24 ENCOUNTER — HOSPITAL ENCOUNTER (EMERGENCY)
Facility: CLINIC | Age: 24
Discharge: HOME | End: 2024-08-25
Attending: EMERGENCY MEDICINE | Admitting: EMERGENCY MEDICINE
Payer: COMMERCIAL

## 2024-08-24 DIAGNOSIS — D57.00 SICKLE CELL PAIN CRISIS (H): ICD-10-CM

## 2024-08-24 PROCEDURE — 93005 ELECTROCARDIOGRAM TRACING: CPT | Performed by: EMERGENCY MEDICINE

## 2024-08-24 PROCEDURE — 93010 ELECTROCARDIOGRAM REPORT: CPT | Performed by: EMERGENCY MEDICINE

## 2024-08-24 PROCEDURE — 99285 EMERGENCY DEPT VISIT HI MDM: CPT | Mod: 25 | Performed by: EMERGENCY MEDICINE

## 2024-08-24 PROCEDURE — 99285 EMERGENCY DEPT VISIT HI MDM: CPT | Performed by: EMERGENCY MEDICINE

## 2024-08-24 RX ORDER — KETOROLAC TROMETHAMINE 15 MG/ML
15 INJECTION, SOLUTION INTRAMUSCULAR; INTRAVENOUS ONCE
Status: COMPLETED | OUTPATIENT
Start: 2024-08-24 | End: 2024-08-25

## 2024-08-24 RX ORDER — IPRATROPIUM BROMIDE AND ALBUTEROL SULFATE 2.5; .5 MG/3ML; MG/3ML
3 SOLUTION RESPIRATORY (INHALATION) ONCE
Status: COMPLETED | OUTPATIENT
Start: 2024-08-24 | End: 2024-08-25

## 2024-08-24 RX ORDER — ONDANSETRON 2 MG/ML
4 INJECTION INTRAMUSCULAR; INTRAVENOUS EVERY 30 MIN PRN
Status: DISCONTINUED | OUTPATIENT
Start: 2024-08-24 | End: 2024-08-25 | Stop reason: HOSPADM

## 2024-08-24 RX ORDER — SODIUM CHLORIDE, SODIUM LACTATE, POTASSIUM CHLORIDE, CALCIUM CHLORIDE 600; 310; 30; 20 MG/100ML; MG/100ML; MG/100ML; MG/100ML
INJECTION, SOLUTION INTRAVENOUS CONTINUOUS
Status: DISCONTINUED | OUTPATIENT
Start: 2024-08-24 | End: 2024-08-25 | Stop reason: HOSPADM

## 2024-08-24 ASSESSMENT — ACTIVITIES OF DAILY LIVING (ADL): ADLS_ACUITY_SCORE: 37

## 2024-08-25 ENCOUNTER — APPOINTMENT (OUTPATIENT)
Dept: ULTRASOUND IMAGING | Facility: CLINIC | Age: 24
End: 2024-08-25
Attending: EMERGENCY MEDICINE
Payer: COMMERCIAL

## 2024-08-25 ENCOUNTER — APPOINTMENT (OUTPATIENT)
Dept: GENERAL RADIOLOGY | Facility: CLINIC | Age: 24
End: 2024-08-25
Attending: EMERGENCY MEDICINE
Payer: COMMERCIAL

## 2024-08-25 VITALS
TEMPERATURE: 98 F | HEIGHT: 64 IN | OXYGEN SATURATION: 100 % | WEIGHT: 145 LBS | SYSTOLIC BLOOD PRESSURE: 118 MMHG | RESPIRATION RATE: 18 BRPM | DIASTOLIC BLOOD PRESSURE: 81 MMHG | BODY MASS INDEX: 24.75 KG/M2 | HEART RATE: 92 BPM

## 2024-08-25 LAB
ALBUMIN SERPL BCG-MCNC: 4.5 G/DL (ref 3.5–5.2)
ALP SERPL-CCNC: 58 U/L (ref 40–150)
ALT SERPL W P-5'-P-CCNC: 60 U/L (ref 0–50)
ANION GAP SERPL CALCULATED.3IONS-SCNC: 11 MMOL/L (ref 7–15)
AST SERPL W P-5'-P-CCNC: 67 U/L (ref 0–45)
ATRIAL RATE - MUSE: 78 BPM
BASOPHILS # BLD AUTO: ABNORMAL 10*3/UL
BASOPHILS # BLD MANUAL: 0.2 10E3/UL (ref 0–0.2)
BASOPHILS NFR BLD AUTO: ABNORMAL %
BASOPHILS NFR BLD MANUAL: 2 %
BILIRUB SERPL-MCNC: 2.9 MG/DL
BUN SERPL-MCNC: 9.3 MG/DL (ref 6–20)
CALCIUM SERPL-MCNC: 8.9 MG/DL (ref 8.8–10.4)
CHLORIDE SERPL-SCNC: 104 MMOL/L (ref 98–107)
CREAT SERPL-MCNC: 0.61 MG/DL (ref 0.51–0.95)
DIASTOLIC BLOOD PRESSURE - MUSE: NORMAL MMHG
EGFRCR SERPLBLD CKD-EPI 2021: >90 ML/MIN/1.73M2
EOSINOPHIL # BLD AUTO: ABNORMAL 10*3/UL
EOSINOPHIL # BLD MANUAL: 0.2 10E3/UL (ref 0–0.7)
EOSINOPHIL NFR BLD AUTO: ABNORMAL %
EOSINOPHIL NFR BLD MANUAL: 2 %
ERYTHROCYTE [DISTWIDTH] IN BLOOD BY AUTOMATED COUNT: 27.6 % (ref 10–15)
GLUCOSE SERPL-MCNC: 95 MG/DL (ref 70–99)
HCG SERPL QL: NEGATIVE
HCO3 SERPL-SCNC: 23 MMOL/L (ref 22–29)
HCT VFR BLD AUTO: 18 % (ref 35–47)
HGB BLD-MCNC: 6.6 G/DL (ref 11.7–15.7)
IMM GRANULOCYTES # BLD: ABNORMAL 10*3/UL
IMM GRANULOCYTES NFR BLD: ABNORMAL %
INTERPRETATION ECG - MUSE: NORMAL
LYMPHOCYTES # BLD AUTO: ABNORMAL 10*3/UL
LYMPHOCYTES # BLD MANUAL: 3.2 10E3/UL (ref 0.8–5.3)
LYMPHOCYTES NFR BLD AUTO: ABNORMAL %
LYMPHOCYTES NFR BLD MANUAL: 26 %
MCH RBC QN AUTO: 32.4 PG (ref 26.5–33)
MCHC RBC AUTO-ENTMCNC: 36.7 G/DL (ref 31.5–36.5)
MCV RBC AUTO: 88 FL (ref 78–100)
MONOCYTES # BLD AUTO: ABNORMAL 10*3/UL
MONOCYTES # BLD MANUAL: 0.6 10E3/UL (ref 0–1.3)
MONOCYTES NFR BLD AUTO: ABNORMAL %
MONOCYTES NFR BLD MANUAL: 5 %
NEUTROPHILS # BLD AUTO: ABNORMAL 10*3/UL
NEUTROPHILS # BLD MANUAL: 8.1 10E3/UL (ref 1.6–8.3)
NEUTROPHILS NFR BLD AUTO: ABNORMAL %
NEUTROPHILS NFR BLD MANUAL: 65 %
NRBC # BLD AUTO: 1.3 10E3/UL
NRBC # BLD AUTO: 2.7 10E3/UL
NRBC BLD AUTO-RTO: 10 /100
NRBC BLD MANUAL-RTO: 22 %
P AXIS - MUSE: 74 DEGREES
PLAT MORPH BLD: ABNORMAL
PLATELET # BLD AUTO: 391 10E3/UL (ref 150–450)
POLYCHROMASIA BLD QL SMEAR: SLIGHT
POTASSIUM SERPL-SCNC: 3.7 MMOL/L (ref 3.4–5.3)
PR INTERVAL - MUSE: 154 MS
PROT SERPL-MCNC: 7.4 G/DL (ref 6.4–8.3)
QRS DURATION - MUSE: 80 MS
QT - MUSE: 400 MS
QTC - MUSE: 456 MS
R AXIS - MUSE: 52 DEGREES
RBC # BLD AUTO: 2.04 10E6/UL (ref 3.8–5.2)
RBC MORPH BLD: ABNORMAL
RETICS # AUTO: 0.45 10E6/UL (ref 0.03–0.1)
RETICS/RBC NFR AUTO: 22.1 % (ref 0.5–2)
SICKLE CELLS BLD QL SMEAR: ABNORMAL
SODIUM SERPL-SCNC: 138 MMOL/L (ref 135–145)
SYSTOLIC BLOOD PRESSURE - MUSE: NORMAL MMHG
T AXIS - MUSE: 27 DEGREES
TARGETS BLD QL SMEAR: SLIGHT
TROPONIN T SERPL HS-MCNC: <6 NG/L
VENTRICULAR RATE- MUSE: 78 BPM
WBC # BLD AUTO: 12.4 10E3/UL (ref 4–11)

## 2024-08-25 PROCEDURE — 96374 THER/PROPH/DIAG INJ IV PUSH: CPT | Performed by: EMERGENCY MEDICINE

## 2024-08-25 PROCEDURE — 80053 COMPREHEN METABOLIC PANEL: CPT | Performed by: EMERGENCY MEDICINE

## 2024-08-25 PROCEDURE — 85045 AUTOMATED RETICULOCYTE COUNT: CPT | Performed by: EMERGENCY MEDICINE

## 2024-08-25 PROCEDURE — 250N000011 HC RX IP 250 OP 636: Performed by: EMERGENCY MEDICINE

## 2024-08-25 PROCEDURE — 250N000009 HC RX 250: Performed by: EMERGENCY MEDICINE

## 2024-08-25 PROCEDURE — 36415 COLL VENOUS BLD VENIPUNCTURE: CPT | Performed by: EMERGENCY MEDICINE

## 2024-08-25 PROCEDURE — 96361 HYDRATE IV INFUSION ADD-ON: CPT | Performed by: EMERGENCY MEDICINE

## 2024-08-25 PROCEDURE — 71046 X-RAY EXAM CHEST 2 VIEWS: CPT

## 2024-08-25 PROCEDURE — 85027 COMPLETE CBC AUTOMATED: CPT | Performed by: EMERGENCY MEDICINE

## 2024-08-25 PROCEDURE — 84484 ASSAY OF TROPONIN QUANT: CPT | Performed by: EMERGENCY MEDICINE

## 2024-08-25 PROCEDURE — 96376 TX/PRO/DX INJ SAME DRUG ADON: CPT | Performed by: EMERGENCY MEDICINE

## 2024-08-25 PROCEDURE — 84703 CHORIONIC GONADOTROPIN ASSAY: CPT | Performed by: EMERGENCY MEDICINE

## 2024-08-25 PROCEDURE — 96375 TX/PRO/DX INJ NEW DRUG ADDON: CPT | Performed by: EMERGENCY MEDICINE

## 2024-08-25 PROCEDURE — 85007 BL SMEAR W/DIFF WBC COUNT: CPT | Performed by: EMERGENCY MEDICINE

## 2024-08-25 PROCEDURE — 258N000003 HC RX IP 258 OP 636: Performed by: EMERGENCY MEDICINE

## 2024-08-25 PROCEDURE — 93971 EXTREMITY STUDY: CPT | Mod: LT

## 2024-08-25 RX ORDER — HEPARIN SODIUM (PORCINE) LOCK FLUSH IV SOLN 100 UNIT/ML 100 UNIT/ML
5-10 SOLUTION INTRAVENOUS
Status: DISCONTINUED | OUTPATIENT
Start: 2024-08-25 | End: 2024-08-25 | Stop reason: HOSPADM

## 2024-08-25 RX ADMIN — HYDROMORPHONE HYDROCHLORIDE 1 MG: 1 INJECTION, SOLUTION INTRAMUSCULAR; INTRAVENOUS; SUBCUTANEOUS at 00:35

## 2024-08-25 RX ADMIN — HYDROMORPHONE HYDROCHLORIDE 1 MG: 1 INJECTION, SOLUTION INTRAMUSCULAR; INTRAVENOUS; SUBCUTANEOUS at 02:49

## 2024-08-25 RX ADMIN — SODIUM CHLORIDE, POTASSIUM CHLORIDE, SODIUM LACTATE AND CALCIUM CHLORIDE: 600; 310; 30; 20 INJECTION, SOLUTION INTRAVENOUS at 00:51

## 2024-08-25 RX ADMIN — ONDANSETRON 4 MG: 2 INJECTION INTRAMUSCULAR; INTRAVENOUS at 00:36

## 2024-08-25 RX ADMIN — KETOROLAC TROMETHAMINE 15 MG: 15 INJECTION, SOLUTION INTRAMUSCULAR; INTRAVENOUS at 00:50

## 2024-08-25 RX ADMIN — HYDROMORPHONE HYDROCHLORIDE 1 MG: 1 INJECTION, SOLUTION INTRAMUSCULAR; INTRAVENOUS; SUBCUTANEOUS at 01:54

## 2024-08-25 RX ADMIN — HEPARIN 5 ML: 100 SYRINGE at 04:11

## 2024-08-25 RX ADMIN — IPRATROPIUM BROMIDE AND ALBUTEROL SULFATE 3 ML: .5; 3 SOLUTION RESPIRATORY (INHALATION) at 01:45

## 2024-08-25 ASSESSMENT — ACTIVITIES OF DAILY LIVING (ADL)
ADLS_ACUITY_SCORE: 37

## 2024-08-25 NOTE — ED PROVIDER NOTES
The patient was accepted at shift change signout pending labs, chest x-ray, lower extremity ultrasound for DVT.  Labs are relatively stable compared with previous.  Reticulocyte count is up, which may correspond to her sickle cell crisis.  Lower extremity ultrasound resulted:    IMPRESSION: No evidence of thrombus in the major veins of the left lower extremity.     Chest x-ray resulted:  IMPRESSION: Negative chest. Left Port-A-Cath in place.     I did reevaluate the patient after her pain medication, she states she feels a fair bit improved.  She does not feel she needs to be admitted, feels she can be discharged home.  Per discussion and plan at the time of signout, patient will be discharged home at this time.  She is encouraged to follow-up with her outpatient provider, continue with her home meds, return for worsening or concerns.  She verbalizes understanding and is agreeable to the plan.    Dictation Disclaimer: Some of this Note has been completed with voice-recognition dictation software. Although errors are generally corrected real-time, there is the potential for a rare error to be present in the completed chart.         Court Ring MD  08/25/24 9352

## 2024-08-25 NOTE — ED PROVIDER NOTES
"  History     Chief Complaint   Patient presents with    Sickle Cell Pain Crisis     HPI  Jennifer Cervantes is a 24 year old female with a past medical history of sickle cell, anxiety, CVA, depression, right hemiplegia, migraine, asthma, cognitive delay who presents to the emergency department with a chief complaint of sickle cell pain crisis.  Located in the bilateral legs.  Patient took her all her pain meds at home which did not help.  Patient does have an ER care plan in place.    The patient states that she was recently admitted to the hospital.  She has some shortness of breath at that time and was worked up for PE, which was negative.  She reports she was sent home with oxycodone and oxygen.  She remains somewhat short of breath, that this is not significantly changed.  She denies any chest pain.  She is afebrile on arrival to the emergency department.  The patient does note some left lower extremity swelling.  She does note that at baseline she thinks her left leg is slightly larger than her right leg, but not to this degree.  No posterior calf pain, most of her pain is anterior and feels \"deep.\"    The patient states that she is due for a blood transfusion soon as her hemoglobin has been less than 7, she thinks that this could be contributing to her symptoms as well.    I have reviewed the Medications, Allergies, Past Medical and Surgical History, and Social History in the [facility] system.    ER CARE PLAN  Acute Pain Crisis Treatment: (limiting IV dosing)  ER   Dilaudid 1 mg IV q1h up to 3 doses  Toradol 30 mg IV x1   Maintenance IV fluids with LR  Other: Zofran 8 mg IV PRN nausea    Narrative & Impression   Examination:NM LUNG SCAN VENTILATION AND PERFUSION, 8/19/2024 3:34 PM      Indication:  Hypoxia, rule out PE      Additional Information: none     D-Dimer: None     Technique:     The patient received   2  mCi of Tc-99m DTPA aerosol for ventilation  and 6.3 mCi of Tc-99m MAA for perfusion. Multiple images were " obtained  of the lungs in Anterior, posterior, RICHTER, RPO, LPO, and  TAE  projections.     Comparison: Same day chest x-ray     Findings:     The ventilation study demonstrates normal inflation of the lungs, with  some tracer in the stomach.     The perfusion study demonstrates no significant perfusion defects.                                                                      Impression:     1. Pulmonary emboli is not present.     2. Radiotracer within the stomach.     I have personally reviewed the examination and initial interpretation  and I agree with the findings.     RACHEL TRIPATHI MD        Past Medical History:   Diagnosis Date    Anxiety     Bleeding disorder (H24)     Blood clotting disorder (H24)     Cerebral infarction (H) 2015    Cognitive developmental delay     low IQ. 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 side (H)     right hand contractures    Iron overload due to repeated red blood cell transfusions     Migraines     Multiple subsegmental pulmonary emboli without acute cor pulmonale (H) 02/01/2021    Oppositional defiant behavior     Presence of intrauterine contraceptive device 2/18/2020    Superficial venous thrombosis of arm, right 03/25/2021    Uncomplicated asthma      Past Surgical History:   Procedure Laterality Date    AS INSERT TUNNELED CV 2 CATH W/O PORT/PUMP      CHOLECYSTECTOMY      CV RIGHT HEART CATH MEASUREMENTS RECORDED N/A 11/18/2021    Procedure: Right Heart Cath;  Surgeon: Jackson Stauffer MD;  Location:  HEART CARDIAC CATH LAB    INSERT PORT VASCULAR ACCESS Left 4/21/2021    Procedure: INSERTION, VASCULAR ACCESS PORT (NOT SURE ON SIDE UNTIL REMOVAL);  Surgeon: Rajan More MD;  Location: [facility] OR    IR CHEST PORT PLACEMENT > 5 YRS OF AGE  4/21/2021    IR CVC NON TUNNEL LINE REMOVAL  5/6/2021    IR CVC NON TUNNEL PLACEMENT > 5 YRS  04/07/2020    IR  CVC NON TUNNEL PLACEMENT > 5 YRS  4/30/2021    IR CVC NON TUNNEL PLACEMENT > 5 YRS  9/7/2022    IR PORT REMOVAL LEFT  4/21/2021    REMOVE PORT VASCULAR ACCESS Left 4/21/2021    Procedure: REMOVAL, VASCULAR ACCESS PORT LEFT;  Surgeon: Rajan More MD;  Location: [facility] OR    REPAIR TENDON ELBOW Right 10/02/2019    Procedure: Right Elbow Flexor Lengthening, Flexor Pronator Slide Of Wrist and Finger, Thumb Adductor Lengthening;  Surgeon: Anai Franco MD;  Location: [facility]    TONSILLECTOMY Bilateral 10/02/2019    Procedure: Bilateral Tonsillectomy;  Surgeon: Farhana Guy MD;  Location: [facility] OR    ZZC BREAST REDUCTION (INCLUDES LIPO) TIER 3 Bilateral 04/23/2019     No current facility-administered medications for this encounter.     Current Outpatient Medications   Medication Sig Dispense Refill    acetaminophen (TYLENOL) 325 MG tablet Take 3 tablets (975 mg) by mouth every 8 hours 270 tablet 0    albuterol (PROAIR HFA/PROVENTIL HFA/VENTOLIN HFA) 108 (90 Base) MCG/ACT inhaler Inhale 2 puffs into the lungs every 6 hours as needed for shortness of breath or wheezing 8.5 g 3    albuterol (PROVENTIL) (2.5 MG/3ML) 0.083% neb solution Take 2 vials (5 mg) by nebulization every 6 hours as needed for shortness of breath or wheezing 90 mL 3    aspirin (ASA) 81 MG chewable tablet Take 1 tablet (81 mg) by mouth 2 times daily 60 tablet 11    budesonide-formoterol (SYMBICORT) 160-4.5 MCG/ACT Inhaler Inhale 2 puffs twice daily plus 1-2 puffs as needed. May use up to 12 puffs per day. 20.4 g 11    deferasirox (JADENU) 360 MG tablet TAKE 4 TABLETS (1,440 MG) BY MOUTH EVERY EVENING. 120 tablet 1    EPINEPHrine (ANY BX GENERIC EQUIV) 0.3 MG/0.3ML injection 2-pack Inject 0.3 mLs (0.3 mg) into the muscle as needed for anaphylaxis 1 each 1    gabapentin (NEURONTIN) 300 MG capsule Take 1 capsule (300 mg) by mouth 3 times daily Take PO 1 pill in evening (300 mg) TID to start. If tolerated, increase by 300 mg in morning or  lunch every few days, updating your doctor's office in time to get refills. The maximum dose is 900 mg TID. 90 capsule 1    hydroxyurea (HYDREA) 500 MG capsule TAKE 6 CAPSULES (3,000 MG) BY MOUTH ONCE DAILY. (Patient taking differently: Take 3,000 mg by mouth daily) 180 capsule 3    ibuprofen (ADVIL/MOTRIN) 800 MG tablet Take 800 mg by mouth every 8 hours as needed for moderate pain      melatonin 5 MG tablet Take 1 tablet (5 mg) by mouth nightly as needed for sleep 30 tablet 1    methocarbamol (ROBAXIN) 750 MG tablet Take 1 tablet (750 mg) by mouth 4 times daily as needed for muscle spasms (during sickle pain crises. Okay to take scheduled while in pain) 60 tablet 1    naloxone (NARCAN) 4 MG/0.1ML nasal spray Spray 4 mg into one nostril alternating nostrils as needed for opioid reversal every 2-3 minutes until assistance arrives 0.2 mL 0    naloxone (NARCAN) 4 MG/0.1ML nasal spray Spray 4 mg into one nostril alternating nostrils as needed for opioid reversal every 2-3 minutes until assistance arrives      omeprazole (PRILOSEC) 20 MG DR capsule Take 1 capsule (20 mg) by mouth daily 30 capsule 0    ondansetron (ZOFRAN ODT) 4 MG ODT tab Take 1 tablet (4 mg) by mouth every 6 hours as needed for nausea or vomiting. 10 tablet 0    ondansetron (ZOFRAN ODT) 8 MG ODT tab Take 1 tablet (8 mg) by mouth every 8 hours as needed for nausea 40 tablet 4    oxyCODONE IR (ROXICODONE) 10 MG tablet Take 1 tablet (10 mg) by mouth every 4 hours as needed for severe pain or breakthrough pain. Goal 4 per day. Max 6 per day. 42 tablet 0    ibuprofen (ADVIL/MOTRIN) 600 MG tablet Take 600 mg by mouth every 6 hours as needed for moderate pain Using rarely, 2x/week at most       Allergies   Allergen Reactions    Contrast Dye Angioedema     Hives and breathing issues    Fish-Derived Products Hives    Seafood Hives    Adhesive Tape Hives     Primipore dressing causes hives    Gadolinium     Iodinated Contrast Media      Past medical history,  "past surgical history, medications, and allergies were reviewed with the patient. Additional pertinent items: None    Social History     Socioeconomic History    Marital status: Single     Spouse name: Not on file    Number of children: Not on file    Years of education: Not on file    Highest education level: Not on file   Occupational History    Not on file   Tobacco Use    Smoking status: Never     Passive exposure: Never    Smokeless tobacco: Never   Substance and Sexual Activity    Alcohol use: Not Currently     Alcohol/week: 0.0 standard drinks of alcohol    Drug use: Never    Sexual activity: Not Currently     Partners: Male     Birth control/protection: Implant   Other Topics Concern    Parent/sibling w/ CABG, MI or angioplasty before 65F 55M? Not Asked   Social History Narrative    Lives with mom and extended family (mom is her PCA and ILS worker) but \"toxic environment\" per her report. As of 9/28/2022 she is planning to move out at some point soon. She has minimal support at home despite her significant SCD comorbidities and cognitive delay from stroke.     Social Determinants of Health     Financial Resource Strain: Low Risk  (7/3/2024)    Financial Resource Strain     Within the past 12 months, have you or your family members you live with been unable to get utilities (heat, electricity) when it was really needed?: No   Food Insecurity: Low Risk  (7/3/2024)    Food Insecurity     Within the past 12 months, did you worry that your food would run out before you got money to buy more?: No     Within the past 12 months, did the food you bought just not last and you didn t have money to get more?: No   Transportation Needs: Low Risk  (7/3/2024)    Transportation Needs     Within the past 12 months, has lack of transportation kept you from medical appointments, getting your medicines, non-medical meetings or appointments, work, or from getting things that you need?: No   Physical Activity: Unknown (7/3/2024) " "   Exercise Vital Sign     Days of Exercise per Week: 1 day     Minutes of Exercise per Session: Not on file   Stress: No Stress Concern Present (7/3/2024)    [employer] [place] [employer] - Occupational Stress Questionnaire     Feeling of Stress : Only a little   Social Connections: Unknown (7/3/2024)    Social Connection and Isolation Panel [NHANES]     Frequency of Communication with Friends and Family: Not on file     Frequency of Social Gatherings with Friends and Family: Never     Attends [employer] Services: Not on file     Active Member of Clubs or Organizations: Not on file     Attends Club or Organization Meetings: Not on file     Marital Status: Not on file   Interpersonal Safety: Low Risk  (7/3/2024)    Interpersonal Safety     Do you feel physically and emotionally safe where you currently live?: Yes     Within the past 12 months, have you been hit, slapped, kicked or otherwise physically hurt by someone?: No     Within the past 12 months, have you been humiliated or emotionally abused in other ways by your partner or ex-partner?: No   Housing Stability: Low Risk  (7/3/2024)    Housing Stability     Do you have housing? : Yes     Are you worried about losing your housing?: No     Social history was reviewed with the patient. Additional pertinent items: None    Review of Systems  A medically appropriate review of systems was performed with pertinent positives and negatives noted in the HPI, and all other systems negative.    Physical Exam   BP: (!) 127/95  Pulse: 92  Temp: 98.2  F (36.8  C)  Resp: 18  Height: 162.6 cm (5' 4\")  Weight: 65.8 kg (145 lb)  SpO2: (!) 88 %      General: Well nourished, well developed, NAD  HEENT: EOMI, anicteric. NCAT, MMM, on supplemental oxygen  Neck: no jugular venous distension, supple, nl ROM  Cardiac: Regular rate and rhythm. No murmurs, rubs, or gallops. Normal S1, S2.  Intact peripheral pulses  Pulm: CTAB, no stridor, wheezes, rales, rhonchi  Abd: Soft, " nontender, nondistended.  No masses palpated.    Skin: Warm and dry to the touch.  No rash  Extremities: Tenderness to bilateral lower extremities, left leg appears larger compared to the right, no cyanosis, w/w/p, contractures to right upper extremity  Neuro: A&Ox3, no gross focal deficits    ED Course        Procedures                    EKG Interpretation:      Interpreted by Lis Hall MD  Time reviewed: 2317  Symptoms at time of EKG: Shortness of breath  Rhythm: normal sinus   Rate: normal, 70 bpm  Axis: normal  Ectopy: none  Conduction: normal  ST Segments/ T Waves: No ST-T wave changes  Q Waves: none  Comparison to prior: Unchanged from 8/18/2024    Clinical Impression: normal EKG           Labs Ordered and Resulted from Time of ED Arrival to Time of ED Departure - No data to display         Results for orders placed or performed during the hospital encounter of 08/24/24 (from the past 24 hour(s))   EKG 12-lead, tracing only   Result Value Ref Range    Systolic Blood Pressure  mmHg    Diastolic Blood Pressure  mmHg    Ventricular Rate 78 BPM    Atrial Rate 78 BPM    MD Interval 154 ms    QRS Duration 80 ms     ms    QTc 456 ms    P Axis 74 degrees    R AXIS 52 degrees    T Axis 27 degrees    Interpretation ECG Sinus rhythm  Normal ECG        *Note: Due to a large number of results and/or encounters for the requested time period, some results have not been displayed. A complete set of results can be found in Results Review.       Labs, vital signs, and imaging studies were reviewed by me.    Medications   lactated ringers infusion (has no administration in time range)   ondansetron (ZOFRAN) injection 4 mg (has no administration in time range)   ketorolac (TORADOL) injection 15 mg (has no administration in time range)   HYDROmorphone (DILAUDID) injection 1 mg (has no administration in time range)   ipratropium - albuterol 0.5 mg/2.5 mg/3 mL (DUONEB) neb solution 3 mL (has no administration in  time range)       Assessments & Plan (with Medical Decision Making)   Jennifer Cervantes is a 24 year old female who presents to the emergency department with sickle cell pain crisis, primarily with bilateral leg pain.  Also with left lower extremity swelling.  Given patient's history of blood clots, concerning for DVT.  Left lower extremity ultrasound was ordered to further evaluate the patient.  Patient also reports shortness of breath, recent negative workup for PE, making this less likely, however, given patient's history of sickle cell disease, would be concerned about acute chest, worsening anemia, ACS.  Labs are ordered to further evaluate the patient in the emergency department.    EKG shows normal sinus rhythm.    Critical care was not performed.     Medical Decision Making  The patient's presentation was of high complexity (a chronic illness severe exacerbation, progression, or side effect of treatment).    The patient's evaluation involved:  review of external note(s) from 1 sources (see separate area of note for details)  review of 1 test result(s) ordered prior to this encounter (see separate area of note for details)  ordering and/or review of 3+ test(s) in this encounter (see separate area of note for details)  independent interpretation of testing performed by another health professional (EKG)    The patient's management necessitated moderate risk (prescription drug management including medications given in the ED), high risk (a parenteral controlled substance), high risk (a decision regarding hospitalization), and further care after sign-out to Dr. Ring (see their note for further management).    I have reviewed the nursing notes.    I have reviewed the findings, diagnosis, plan and need for follow up with the patient.    Patient signed out to oncoming provider, labs chest x-ray, ultrasound are pending at this time.  Disposition pending results and reevaluation of patient's pain after pain medications  administered per patient's ER care plan.  If patient's pain cannot be adequately controlled, she may require admission for further management.    New Prescriptions    No medications on file       Final diagnoses:   Sickle cell pain crisis (H)       GREG HALL MD  8/24/2024   [facility] EMERGENCY DEPARTMENT       Greg Hall MD  08/24/24 1952

## 2024-08-25 NOTE — ED TRIAGE NOTES
Pt. here with sickle cell pain.  Pain mainly in bilateral legs.  Oral pain meds at home not working.     Triage Assessment (Adult)       Row Name 08/24/24 2680          Triage Assessment    Airway WDL WDL        Respiratory WDL    Respiratory WDL X  secrease saturation        Skin Circulation/Temperature WDL    Skin Circulation/Temperature WDL WDL        Cardiac WDL    Cardiac WDL WDL        Peripheral/Neurovascular WDL    Peripheral Neurovascular WDL WDL        Cognitive/Neuro/Behavioral WDL    Cognitive/Neuro/Behavioral WDL WDL

## 2024-08-25 NOTE — DISCHARGE INSTRUCTIONS
Continue with your normal medications. Follow up with your outpatient doctor. Return with any worsening or concerns.

## 2024-08-26 ENCOUNTER — PATIENT OUTREACH (OUTPATIENT)
Dept: CARE COORDINATION | Facility: CLINIC | Age: 24
End: 2024-08-26
Payer: COMMERCIAL

## 2024-08-26 ENCOUNTER — TELEPHONE (OUTPATIENT)
Dept: ONCOLOGY | Facility: CLINIC | Age: 24
End: 2024-08-26
Payer: COMMERCIAL

## 2024-08-26 ENCOUNTER — INFUSION THERAPY VISIT (OUTPATIENT)
Dept: INFUSION THERAPY | Facility: CLINIC | Age: 24
End: 2024-08-26
Attending: PEDIATRICS
Payer: COMMERCIAL

## 2024-08-26 ENCOUNTER — NURSE TRIAGE (OUTPATIENT)
Dept: ONCOLOGY | Facility: CLINIC | Age: 24
End: 2024-08-26
Payer: COMMERCIAL

## 2024-08-26 VITALS
HEART RATE: 112 BPM | TEMPERATURE: 97.9 F | RESPIRATION RATE: 16 BRPM | DIASTOLIC BLOOD PRESSURE: 80 MMHG | SYSTOLIC BLOOD PRESSURE: 135 MMHG | OXYGEN SATURATION: 94 %

## 2024-08-26 DIAGNOSIS — D57.00 SICKLE CELL PAIN CRISIS (H): Primary | ICD-10-CM

## 2024-08-26 DIAGNOSIS — G81.10 SPASTIC HEMIPLEGIA, UNSPECIFIED ETIOLOGY, UNSPECIFIED LATERALITY (H): ICD-10-CM

## 2024-08-26 PROCEDURE — 96361 HYDRATE IV INFUSION ADD-ON: CPT

## 2024-08-26 PROCEDURE — 96376 TX/PRO/DX INJ SAME DRUG ADON: CPT

## 2024-08-26 PROCEDURE — 250N000011 HC RX IP 250 OP 636: Performed by: PEDIATRICS

## 2024-08-26 PROCEDURE — 258N000003 HC RX IP 258 OP 636: Performed by: PEDIATRICS

## 2024-08-26 PROCEDURE — 96374 THER/PROPH/DIAG INJ IV PUSH: CPT

## 2024-08-26 PROCEDURE — 250N000013 HC RX MED GY IP 250 OP 250 PS 637: Performed by: PEDIATRICS

## 2024-08-26 PROCEDURE — 96375 TX/PRO/DX INJ NEW DRUG ADDON: CPT

## 2024-08-26 RX ORDER — KETOROLAC TROMETHAMINE 30 MG/ML
30 INJECTION, SOLUTION INTRAMUSCULAR; INTRAVENOUS ONCE
Status: COMPLETED | OUTPATIENT
Start: 2024-08-26 | End: 2024-08-26

## 2024-08-26 RX ORDER — HEPARIN SODIUM (PORCINE) LOCK FLUSH IV SOLN 100 UNIT/ML 100 UNIT/ML
5 SOLUTION INTRAVENOUS
Status: CANCELLED | OUTPATIENT
Start: 2024-10-01

## 2024-08-26 RX ORDER — HEPARIN SODIUM,PORCINE 10 UNIT/ML
5 VIAL (ML) INTRAVENOUS
Status: CANCELLED | OUTPATIENT
Start: 2024-10-01

## 2024-08-26 RX ORDER — ONDANSETRON 4 MG/1
8 TABLET, FILM COATED ORAL
Status: CANCELLED
Start: 2024-10-01

## 2024-08-26 RX ORDER — ONDANSETRON 2 MG/ML
8 INJECTION INTRAMUSCULAR; INTRAVENOUS EVERY 6 HOURS PRN
Status: CANCELLED
Start: 2024-10-01

## 2024-08-26 RX ORDER — DIPHENHYDRAMINE HCL 25 MG
50 CAPSULE ORAL
Status: CANCELLED
Start: 2024-10-01

## 2024-08-26 RX ORDER — KETOROLAC TROMETHAMINE 30 MG/ML
30 INJECTION, SOLUTION INTRAMUSCULAR; INTRAVENOUS ONCE
Status: CANCELLED
Start: 2024-10-01 | End: 2024-10-01

## 2024-08-26 RX ORDER — HEPARIN SODIUM (PORCINE) LOCK FLUSH IV SOLN 100 UNIT/ML 100 UNIT/ML
5 SOLUTION INTRAVENOUS
Status: DISCONTINUED | OUTPATIENT
Start: 2024-08-26 | End: 2024-08-26 | Stop reason: HOSPADM

## 2024-08-26 RX ORDER — DIPHENHYDRAMINE HCL 25 MG
50 CAPSULE ORAL
Status: COMPLETED | OUTPATIENT
Start: 2024-08-26 | End: 2024-08-26

## 2024-08-26 RX ORDER — ONDANSETRON 2 MG/ML
8 INJECTION INTRAMUSCULAR; INTRAVENOUS EVERY 6 HOURS PRN
Status: DISCONTINUED | OUTPATIENT
Start: 2024-08-26 | End: 2024-08-26 | Stop reason: HOSPADM

## 2024-08-26 RX ADMIN — ONDANSETRON 8 MG: 2 INJECTION INTRAMUSCULAR; INTRAVENOUS at 14:31

## 2024-08-26 RX ADMIN — KETOROLAC TROMETHAMINE 30 MG: 30 INJECTION, SOLUTION INTRAMUSCULAR; INTRAVENOUS at 14:32

## 2024-08-26 RX ADMIN — HYDROMORPHONE HYDROCHLORIDE 1 MG: 1 INJECTION, SOLUTION INTRAMUSCULAR; INTRAVENOUS; SUBCUTANEOUS at 16:05

## 2024-08-26 RX ADMIN — Medication 5 ML: at 16:56

## 2024-08-26 RX ADMIN — HYDROMORPHONE HYDROCHLORIDE 1 MG: 1 INJECTION, SOLUTION INTRAMUSCULAR; INTRAVENOUS; SUBCUTANEOUS at 15:15

## 2024-08-26 RX ADMIN — HYDROMORPHONE HYDROCHLORIDE 1 MG: 1 INJECTION, SOLUTION INTRAMUSCULAR; INTRAVENOUS; SUBCUTANEOUS at 14:15

## 2024-08-26 RX ADMIN — DIPHENHYDRAMINE HYDROCHLORIDE 50 MG: 25 CAPSULE ORAL at 14:30

## 2024-08-26 RX ADMIN — SODIUM CHLORIDE, POTASSIUM CHLORIDE, SODIUM LACTATE AND CALCIUM CHLORIDE 1000 ML: 600; 310; 30; 20 INJECTION, SOLUTION INTRAVENOUS at 14:15

## 2024-08-26 ASSESSMENT — PAIN SCALES - GENERAL: PAINLEVEL: EXTREME PAIN (8)

## 2024-08-26 NOTE — TELEPHONE ENCOUNTER
Jennifer called, checking on infusion appt status. As of 0954 there are no appts available.   Triage will call jennifer if appt opens up.

## 2024-08-26 NOTE — PROGRESS NOTES
Infusion Nursing Note:  Jennifer Cervantes presents today for   Chief Complaint   Patient presents with    Infusion     IV hydration, anti-emetics, analgesia       Patient seen by provider today: No   present during visit today: Not Applicable.    Note:   -Orders from Eric Duncan MD completed. Frequency: as needed via triage.  -1L LR IV over 2hrs 15min.  -50mg Benadryl PO x1.  -8mg Zofran IVP x1.  -30mg Toradol IVP x1.  -1mg Dilaudid IVP hourly x3.  -Pain at start of infusion 8/10; bilateral legs. Pain at discharge 5/10.    Intravenous Access:  Implanted Port accessed by RN.    Treatment Conditions:  Not Applicable.    Post Infusion Assessment:  Patient tolerated infusion without incident.  Blood return noted pre and post infusion.  Site patent and intact, free from redness, edema or discomfort.  No evidence of extravasations.  Access discontinued per protocol.     Discharge Plan:   Discharge instructions reviewed with: Patient.  Patient and/or family verbalized understanding of discharge instructions and all questions answered.  AVS to patient via [employer].      Patient will call triage for next appointment.     Patient discharged in stable condition accompanied by: self.  Departure Mode: Ambulatory.      Roz Kaur RN    /80 (BP Location: Left arm, Cuff Size: Adult Regular)   Pulse 112   Temp 97.9  F (36.6  C)   Resp 16   LMP  (LMP Unknown)   SpO2 94%     Administrations This Visit       diphenhydrAMINE (BENADRYL) capsule 50 mg       Admin Date  08/26/2024 Action  $Given Dose  50 mg Route  Oral Documented By  Roz Kaur RN              HYDROmorphone (DILAUDID) injection 1 mg       Admin Date  08/26/2024 Action  $Given Dose  1 mg Route  Intravenous Documented By  Roz Kaur RN               Admin Date  08/26/2024 Action  $Given Dose  1 mg Route  Intravenous Documented By  Roz Kaur RN               Admin Date  08/26/2024 Action  $Given Dose  1 mg Route  Intravenous Documented  By  Roz Kaur, JOE              ketorolac (TORADOL) injection 30 mg       Admin Date  08/26/2024 Action  $Given Dose  30 mg Route  Intravenous Documented By  Roz Kaur, JOE              lactated ringers BOLUS 1,000 mL       Admin Date  08/26/2024 Action  $New Bag Dose  1,000 mL Rate  500 mL/hr Route  Intravenous Documented By  Roz Kaur, JOE              ondansetron (ZOFRAN) injection 8 mg       Admin Date  08/26/2024 Action  $Given Dose  8 mg Route  Intravenous Documented By  Roz Kaur, RN

## 2024-08-26 NOTE — TELEPHONE ENCOUNTER
Call from pt to check status of infusion wait list. Pt is next on the list- additional message sent to infusion team.   Pt said she is open to going to another location, as she really wants to stay out of ED.   1141 call to [facility], who states they could potentially take pt if she can get to facility by 12:45pm.   SW unsure if possible, as insurance needs a 2 hour notice. Information relayed to pt and advised if pain is not tolerable or worsening, to go to ED. Pt again said she wanted to stay out of ED.

## 2024-08-26 NOTE — TELEPHONE ENCOUNTER
PA Initiation    Medication: DEFERASIROX 360 MG PO TABS  Insurance Company: [employer] - Phone [phone] Fax [phone]  Pharmacy Filling the Rx: [facility] - [place], [place] - [address]  Filling Pharmacy Phone:    Filling Pharmacy Fax:    Start Date: 8/26/2024          Thank you,    Carole Ritchie  Oncology Pharmacy Liaison KARAN [email or web address]  Phone: [phone]  Fax: [phone]

## 2024-08-26 NOTE — TELEPHONE ENCOUNTER
1228 message from [facility] stating  has approved IVF/Pain meds for pt today.    Available appt with [facility] at 2pm. Writer sent message to SW to check if they would be able to set up transportation for pt.    1238 Call to pt to inform her of available appt and ask if she would have transportation to clinic. Pt stating she is able to find a ride to appt but will need assistance with ride home. Informed pt writer will have pt scheduled for 2pm infusion and reach out to  to assist with transportation home. Pt verbalized understanding.     Message sent to [facility] and SW

## 2024-08-26 NOTE — TELEPHONE ENCOUNTER
Oncology Nurse Triage - Sickle Cell Pain Crisis:  Situation: Jennifer  calling about Sickle Cell Pain Crisis, requesting to be added on for IV fluids and pain medicine    Background:   Patient's last infusion was 8/24/24 in ED  Last clinic visit date:8/15/24 with Patricia Mantilla CNP  Does patient have active treatment plan?  Yes    Assessment of Symptoms:  Onset/Duration of symptoms: 3 day    Is it typical sickle cell pain? Yes   Location: bilateral legs  Character: Sharp           Intensity: 8/10    Any radiation of pain, numbness, tingling, weakness, warmth, swelling, discoloration of arms or legs?No     Fever?No  Chest Pain Present: No   Shortness of breath: No     Other home therapies tried: HEAT/HEATING PAD and WARM BATH   Last home medication taken and when: oxycodone at  0600    Any Refills Needed?: No     Does patient have transportation & length of time to get to clinic: No 15-20 minutes    0908: Secure chat sent to Dr. Duncan to advise about Jennifer coming in for infusion after ED visit yesterday.    Recommendations:   If you do not hear from the infusion center by 2pm then you will not be able to get in for an infusion today. If symptoms worsen while waiting for call back, and/or you experience fever, chills, SOB, chest pain, cough, n/v, dizziness, numbness, swelling, discoloration of extremities, then seek emergency evaluation in Emergency Department.     Pt voiced understanding.     Added to infusion wait list.

## 2024-08-26 NOTE — PROGRESS NOTES
Social Work - Transportation  [facility]    Data/Intervention:  Patient Name: Jennifer Cervantes Goes By: Jennifer    /Age: 2000 (24 year old)    Referral From: Ange Abbasi RN  Reason for Referral:  support requested for patient transportation needs for today's appointment.  Assessment:  called [employer] to arrange ride through patient's insurance. [employer] arranged ride home with [employer]. Patient's ride is scheduled for a 5:30pm  time.  Plan: Patient is aware of the transportation plan.  available to assist with any other needs.    CARLOS Chavez,LGUDAY  Hematology/Oncology Social Worker  Phone:[phone] Pager: [phone]

## 2024-08-27 NOTE — TELEPHONE ENCOUNTER
Prior Authorization Approval    Medication: DEFERASIROX 360 MG PO TABS  Authorization Effective Date: 7/27/2024  Authorization Expiration Date: 8/27/2025  Approved Dose/Quantity: 120 per 30 DS  Reference #: [id number]   Insurance Company: [employer] - Phone [phone] Fax [phone]  Expected CoPay: $    CoPay Card Available:      Financial Assistance Needed:   Which Pharmacy is filling the prescription: [facility] - [place] [address]   Pharmacy Notified:   Patient Notified:           Thank you,    Carole Ritchie  Oncology Pharmacy Liaison II  [email or web address]  Phone: [phone]  Fax: [phone]

## 2024-08-28 ENCOUNTER — INFUSION THERAPY VISIT (OUTPATIENT)
Dept: TRANSPLANT | Facility: CLINIC | Age: 24
End: 2024-08-28
Attending: PEDIATRICS
Payer: COMMERCIAL

## 2024-08-28 ENCOUNTER — NURSE TRIAGE (OUTPATIENT)
Dept: ONCOLOGY | Facility: CLINIC | Age: 24
End: 2024-08-28
Payer: COMMERCIAL

## 2024-08-28 VITALS
DIASTOLIC BLOOD PRESSURE: 87 MMHG | RESPIRATION RATE: 20 BRPM | HEART RATE: 107 BPM | SYSTOLIC BLOOD PRESSURE: 122 MMHG | TEMPERATURE: 98.1 F | OXYGEN SATURATION: 97 %

## 2024-08-28 DIAGNOSIS — D57.1 HB-SS DISEASE WITHOUT CRISIS (H): ICD-10-CM

## 2024-08-28 DIAGNOSIS — D57.00 SICKLE CELL PAIN CRISIS (H): Primary | ICD-10-CM

## 2024-08-28 DIAGNOSIS — Z86.73 HISTORY OF STROKE: ICD-10-CM

## 2024-08-28 DIAGNOSIS — G81.10 SPASTIC HEMIPLEGIA, UNSPECIFIED ETIOLOGY, UNSPECIFIED LATERALITY (H): ICD-10-CM

## 2024-08-28 LAB
ABO/RH(D): NORMAL
ANTIBODY SCREEN: NEGATIVE
BASOPHILS # BLD AUTO: 0.2 10E3/UL (ref 0–0.2)
BASOPHILS NFR BLD AUTO: 1 %
EOSINOPHIL # BLD AUTO: 0.1 10E3/UL (ref 0–0.7)
EOSINOPHIL NFR BLD AUTO: 0 %
ERYTHROCYTE [DISTWIDTH] IN BLOOD BY AUTOMATED COUNT: 23.5 % (ref 10–15)
HCT VFR BLD AUTO: 17.1 % (ref 35–47)
HGB BLD-MCNC: 6.4 G/DL (ref 11.7–15.7)
IMM GRANULOCYTES # BLD: 0.1 10E3/UL
IMM GRANULOCYTES NFR BLD: 0 %
LYMPHOCYTES # BLD AUTO: 1.9 10E3/UL (ref 0.8–5.3)
LYMPHOCYTES NFR BLD AUTO: 11 %
MCH RBC QN AUTO: 31.4 PG (ref 26.5–33)
MCHC RBC AUTO-ENTMCNC: 37.4 G/DL (ref 31.5–36.5)
MCV RBC AUTO: 84 FL (ref 78–100)
MONOCYTES # BLD AUTO: 1.1 10E3/UL (ref 0–1.3)
MONOCYTES NFR BLD AUTO: 7 %
NEUTROPHILS # BLD AUTO: 13.5 10E3/UL (ref 1.6–8.3)
NEUTROPHILS NFR BLD AUTO: 81 %
NRBC # BLD AUTO: 0.3 10E3/UL
NRBC BLD AUTO-RTO: 2 /100
PLATELET # BLD AUTO: 370 10E3/UL (ref 150–450)
RBC # BLD AUTO: 2.04 10E6/UL (ref 3.8–5.2)
SPECIMEN EXPIRATION DATE: NORMAL
WBC # BLD AUTO: 16.9 10E3/UL (ref 4–11)

## 2024-08-28 PROCEDURE — 86923 COMPATIBILITY TEST ELECTRIC: CPT | Performed by: PEDIATRICS

## 2024-08-28 PROCEDURE — 250N000013 HC RX MED GY IP 250 OP 250 PS 637: Performed by: PEDIATRICS

## 2024-08-28 PROCEDURE — 250N000011 HC RX IP 250 OP 636: Performed by: PEDIATRICS

## 2024-08-28 PROCEDURE — 85025 COMPLETE CBC W/AUTO DIFF WBC: CPT | Performed by: PEDIATRICS

## 2024-08-28 PROCEDURE — 96375 TX/PRO/DX INJ NEW DRUG ADDON: CPT

## 2024-08-28 PROCEDURE — 96376 TX/PRO/DX INJ SAME DRUG ADON: CPT

## 2024-08-28 PROCEDURE — 86900 BLOOD TYPING SEROLOGIC ABO: CPT | Performed by: PEDIATRICS

## 2024-08-28 PROCEDURE — 36591 DRAW BLOOD OFF VENOUS DEVICE: CPT | Performed by: PEDIATRICS

## 2024-08-28 PROCEDURE — 258N000003 HC RX IP 258 OP 636: Performed by: PEDIATRICS

## 2024-08-28 PROCEDURE — 96361 HYDRATE IV INFUSION ADD-ON: CPT

## 2024-08-28 PROCEDURE — 96374 THER/PROPH/DIAG INJ IV PUSH: CPT

## 2024-08-28 RX ORDER — HEPARIN SODIUM (PORCINE) LOCK FLUSH IV SOLN 100 UNIT/ML 100 UNIT/ML
5 SOLUTION INTRAVENOUS
Status: CANCELLED | OUTPATIENT
Start: 2024-10-01

## 2024-08-28 RX ORDER — KETOROLAC TROMETHAMINE 30 MG/ML
30 INJECTION, SOLUTION INTRAMUSCULAR; INTRAVENOUS ONCE
Status: COMPLETED | OUTPATIENT
Start: 2024-08-28 | End: 2024-08-28

## 2024-08-28 RX ORDER — ONDANSETRON 2 MG/ML
8 INJECTION INTRAMUSCULAR; INTRAVENOUS EVERY 6 HOURS PRN
Status: CANCELLED
Start: 2024-10-01

## 2024-08-28 RX ORDER — HEPARIN SODIUM,PORCINE 10 UNIT/ML
5 VIAL (ML) INTRAVENOUS
Status: CANCELLED | OUTPATIENT
Start: 2024-08-28

## 2024-08-28 RX ORDER — ONDANSETRON 2 MG/ML
8 INJECTION INTRAMUSCULAR; INTRAVENOUS EVERY 6 HOURS PRN
Status: DISCONTINUED | OUTPATIENT
Start: 2024-08-28 | End: 2024-08-29 | Stop reason: HOSPADM

## 2024-08-28 RX ORDER — HEPARIN SODIUM (PORCINE) LOCK FLUSH IV SOLN 100 UNIT/ML 100 UNIT/ML
5 SOLUTION INTRAVENOUS
Status: DISCONTINUED | OUTPATIENT
Start: 2024-08-28 | End: 2024-08-29 | Stop reason: HOSPADM

## 2024-08-28 RX ORDER — HEPARIN SODIUM (PORCINE) LOCK FLUSH IV SOLN 100 UNIT/ML 100 UNIT/ML
5 SOLUTION INTRAVENOUS
Status: CANCELLED | OUTPATIENT
Start: 2024-08-28

## 2024-08-28 RX ORDER — KETOROLAC TROMETHAMINE 30 MG/ML
30 INJECTION, SOLUTION INTRAMUSCULAR; INTRAVENOUS ONCE
Status: CANCELLED
Start: 2024-10-01 | End: 2024-10-01

## 2024-08-28 RX ORDER — HEPARIN SODIUM,PORCINE 10 UNIT/ML
5 VIAL (ML) INTRAVENOUS
Status: CANCELLED | OUTPATIENT
Start: 2024-10-01

## 2024-08-28 RX ORDER — DIPHENHYDRAMINE HCL 25 MG
50 CAPSULE ORAL
Status: CANCELLED
Start: 2024-10-01

## 2024-08-28 RX ORDER — ONDANSETRON 4 MG/1
8 TABLET, FILM COATED ORAL
Status: CANCELLED
Start: 2024-10-01

## 2024-08-28 RX ORDER — DIPHENHYDRAMINE HCL 25 MG
50 CAPSULE ORAL
Status: COMPLETED | OUTPATIENT
Start: 2024-08-28 | End: 2024-08-28

## 2024-08-28 RX ADMIN — Medication 5 ML: at 11:52

## 2024-08-28 RX ADMIN — HYDROMORPHONE HYDROCHLORIDE 1 MG: 1 INJECTION, SOLUTION INTRAMUSCULAR; INTRAVENOUS; SUBCUTANEOUS at 11:51

## 2024-08-28 RX ADMIN — KETOROLAC TROMETHAMINE 30 MG: 30 INJECTION, SOLUTION INTRAMUSCULAR; INTRAVENOUS at 09:36

## 2024-08-28 RX ADMIN — ONDANSETRON 8 MG: 2 INJECTION INTRAMUSCULAR; INTRAVENOUS at 09:36

## 2024-08-28 RX ADMIN — HYDROMORPHONE HYDROCHLORIDE 1 MG: 1 INJECTION, SOLUTION INTRAMUSCULAR; INTRAVENOUS; SUBCUTANEOUS at 10:39

## 2024-08-28 RX ADMIN — SODIUM CHLORIDE, POTASSIUM CHLORIDE, SODIUM LACTATE AND CALCIUM CHLORIDE 1000 ML: 600; 310; 30; 20 INJECTION, SOLUTION INTRAVENOUS at 09:37

## 2024-08-28 RX ADMIN — HYDROMORPHONE HYDROCHLORIDE 1 MG: 1 INJECTION, SOLUTION INTRAMUSCULAR; INTRAVENOUS; SUBCUTANEOUS at 09:37

## 2024-08-28 RX ADMIN — DIPHENHYDRAMINE HYDROCHLORIDE 50 MG: 25 CAPSULE ORAL at 09:37

## 2024-08-28 ASSESSMENT — PAIN SCALES - GENERAL: PAINLEVEL: WORST PAIN (10)

## 2024-08-28 NOTE — TELEPHONE ENCOUNTER
Available time with BMT at 0900. Call to pt to inform her of appt and ask if she is able to find transportation to clinic since SW is not available until later today. Pt stating she will try to call and coordinate her transportation and then contact writer to update.

## 2024-08-28 NOTE — TELEPHONE ENCOUNTER
Jennifer is calling to confirm that she has a ride to clinic and can be here at 0900.   Updated Infusion charge nurse.  Message sent to [facility] to schedule pt.     Called Jennifer to inquire if she needs transportation home, but she said that she called a cab and they will give her a ride home. She does not need transportation assistance today for a ride home.    Message routed to Care Team.

## 2024-08-28 NOTE — TELEPHONE ENCOUNTER
"Oncology Nurse Triage - Sickle Cell Pain Crisis:    Situation: Jennifer  calling about Sickle Cell Pain Crisis, requesting to be added on for IV fluids and pain medicine    Background:     Patient's last infusion was 08/26/24  Last clinic visit date:8/15/24 with Patricia Mantilla CNP  Does patient have active treatment plan?  Yes      Assessment of Symptoms:  Onset/Duration of symptoms: 1 day    Is it typical sickle cell pain? Yes   Location: Whole body \"and uterus too and I can't sit or lay down.\" States this type of pain is not new she has had this for a while now.  Character: Sharp/stabbing           Intensity: 10/10    Any radiation of pain, numbness, tingling, weakness, warmth, swelling, discoloration of arms or legs?No     Fever?No  (if yes max temperature recorded in last 24 hours):      Chest Pain Present: No     Shortness of breath: No     Other home therapies tried: HEAT/HEATING PAD and warm showers      Last home medication taken and when: 0600 oxycodone and ibuprofen    Any Refills Needed?: No     Does patient have transportation & length of time to get to clinic: No, needs help coordinating transportation.         Recommendations:   If you do not hear from the infusion center by 2pm then you will not be able to get in for an infusion today. If symptoms worsen while waiting for call back, and/or you experience fever, chills, SOB, chest pain, cough, n/v, dizziness, numbness, swelling, discoloration of extremities, then seek emergency evaluation in Emergency Department.               "

## 2024-08-29 ENCOUNTER — HOSPITAL ENCOUNTER (EMERGENCY)
Facility: CLINIC | Age: 24
Discharge: HOME | End: 2024-08-29
Attending: EMERGENCY MEDICINE | Admitting: EMERGENCY MEDICINE
Payer: COMMERCIAL

## 2024-08-29 ENCOUNTER — INFUSION THERAPY VISIT (OUTPATIENT)
Dept: TRANSPLANT | Facility: CLINIC | Age: 24
End: 2024-08-29
Attending: PEDIATRICS
Payer: COMMERCIAL

## 2024-08-29 VITALS
HEIGHT: 64 IN | RESPIRATION RATE: 16 BRPM | OXYGEN SATURATION: 90 % | SYSTOLIC BLOOD PRESSURE: 121 MMHG | TEMPERATURE: 98.1 F | HEART RATE: 80 BPM | DIASTOLIC BLOOD PRESSURE: 75 MMHG | WEIGHT: 150 LBS | BODY MASS INDEX: 25.61 KG/M2

## 2024-08-29 VITALS
SYSTOLIC BLOOD PRESSURE: 113 MMHG | TEMPERATURE: 99.1 F | HEART RATE: 93 BPM | OXYGEN SATURATION: 98 % | DIASTOLIC BLOOD PRESSURE: 70 MMHG | RESPIRATION RATE: 20 BRPM

## 2024-08-29 DIAGNOSIS — M79.604 BILATERAL LEG PAIN: ICD-10-CM

## 2024-08-29 DIAGNOSIS — D57.1 HB-SS DISEASE WITHOUT CRISIS (H): Primary | ICD-10-CM

## 2024-08-29 DIAGNOSIS — Z86.73 HISTORY OF STROKE: ICD-10-CM

## 2024-08-29 DIAGNOSIS — M79.605 BILATERAL LEG PAIN: ICD-10-CM

## 2024-08-29 PROCEDURE — 258N000003 HC RX IP 258 OP 636: Performed by: EMERGENCY MEDICINE

## 2024-08-29 PROCEDURE — 250N000013 HC RX MED GY IP 250 OP 250 PS 637: Performed by: EMERGENCY MEDICINE

## 2024-08-29 PROCEDURE — 96361 HYDRATE IV INFUSION ADD-ON: CPT | Performed by: EMERGENCY MEDICINE

## 2024-08-29 PROCEDURE — 36430 TRANSFUSION BLD/BLD COMPNT: CPT

## 2024-08-29 PROCEDURE — 250N000011 HC RX IP 250 OP 636: Performed by: EMERGENCY MEDICINE

## 2024-08-29 PROCEDURE — 250N000011 HC RX IP 250 OP 636: Performed by: PEDIATRICS

## 2024-08-29 PROCEDURE — 96375 TX/PRO/DX INJ NEW DRUG ADDON: CPT | Performed by: EMERGENCY MEDICINE

## 2024-08-29 PROCEDURE — 96374 THER/PROPH/DIAG INJ IV PUSH: CPT

## 2024-08-29 PROCEDURE — 99285 EMERGENCY DEPT VISIT HI MDM: CPT | Mod: 25 | Performed by: EMERGENCY MEDICINE

## 2024-08-29 PROCEDURE — 96376 TX/PRO/DX INJ SAME DRUG ADON: CPT | Performed by: EMERGENCY MEDICINE

## 2024-08-29 PROCEDURE — 99285 EMERGENCY DEPT VISIT HI MDM: CPT | Performed by: EMERGENCY MEDICINE

## 2024-08-29 PROCEDURE — 96374 THER/PROPH/DIAG INJ IV PUSH: CPT | Performed by: EMERGENCY MEDICINE

## 2024-08-29 RX ORDER — HEPARIN SODIUM,PORCINE 10 UNIT/ML
5 VIAL (ML) INTRAVENOUS
OUTPATIENT
Start: 2024-08-29

## 2024-08-29 RX ORDER — HEPARIN SODIUM (PORCINE) LOCK FLUSH IV SOLN 100 UNIT/ML 100 UNIT/ML
5-10 SOLUTION INTRAVENOUS
Status: DISCONTINUED | OUTPATIENT
Start: 2024-08-29 | End: 2024-08-30 | Stop reason: HOSPADM

## 2024-08-29 RX ORDER — HEPARIN SODIUM,PORCINE 10 UNIT/ML
5-10 VIAL (ML) INTRAVENOUS EVERY 24 HOURS
Status: DISCONTINUED | OUTPATIENT
Start: 2024-08-29 | End: 2024-08-30 | Stop reason: HOSPADM

## 2024-08-29 RX ORDER — ONDANSETRON 2 MG/ML
4 INJECTION INTRAMUSCULAR; INTRAVENOUS ONCE
Status: COMPLETED | OUTPATIENT
Start: 2024-08-29 | End: 2024-08-29

## 2024-08-29 RX ORDER — ACETAMINOPHEN 325 MG/1
975 TABLET ORAL ONCE
Status: COMPLETED | OUTPATIENT
Start: 2024-08-29 | End: 2024-08-29

## 2024-08-29 RX ORDER — HEPARIN SODIUM (PORCINE) LOCK FLUSH IV SOLN 100 UNIT/ML 100 UNIT/ML
5 SOLUTION INTRAVENOUS
Status: DISCONTINUED | OUTPATIENT
Start: 2024-08-29 | End: 2024-08-29 | Stop reason: HOSPADM

## 2024-08-29 RX ORDER — KETOROLAC TROMETHAMINE 30 MG/ML
30 INJECTION, SOLUTION INTRAMUSCULAR; INTRAVENOUS ONCE
Status: COMPLETED | OUTPATIENT
Start: 2024-08-29 | End: 2024-08-29

## 2024-08-29 RX ORDER — HEPARIN SODIUM,PORCINE 10 UNIT/ML
5-10 VIAL (ML) INTRAVENOUS
Status: DISCONTINUED | OUTPATIENT
Start: 2024-08-29 | End: 2024-08-30 | Stop reason: HOSPADM

## 2024-08-29 RX ORDER — HEPARIN SODIUM (PORCINE) LOCK FLUSH IV SOLN 100 UNIT/ML 100 UNIT/ML
5 SOLUTION INTRAVENOUS
OUTPATIENT
Start: 2024-08-29

## 2024-08-29 RX ADMIN — HYDROMORPHONE HYDROCHLORIDE 1 MG: 1 INJECTION, SOLUTION INTRAMUSCULAR; INTRAVENOUS; SUBCUTANEOUS at 20:21

## 2024-08-29 RX ADMIN — SODIUM CHLORIDE, POTASSIUM CHLORIDE, SODIUM LACTATE AND CALCIUM CHLORIDE 1000 ML: 600; 310; 30; 20 INJECTION, SOLUTION INTRAVENOUS at 20:31

## 2024-08-29 RX ADMIN — HEPARIN 5 ML: 100 SYRINGE at 21:56

## 2024-08-29 RX ADMIN — KETOROLAC TROMETHAMINE 30 MG: 30 INJECTION, SOLUTION INTRAMUSCULAR at 20:31

## 2024-08-29 RX ADMIN — HYDROMORPHONE HYDROCHLORIDE 1 MG: 1 INJECTION, SOLUTION INTRAMUSCULAR; INTRAVENOUS; SUBCUTANEOUS at 21:27

## 2024-08-29 RX ADMIN — ONDANSETRON 4 MG: 2 INJECTION INTRAMUSCULAR; INTRAVENOUS at 19:15

## 2024-08-29 RX ADMIN — HYDROMORPHONE HYDROCHLORIDE 1 MG: 1 INJECTION, SOLUTION INTRAMUSCULAR; INTRAVENOUS; SUBCUTANEOUS at 15:19

## 2024-08-29 RX ADMIN — Medication 5 ML: at 15:21

## 2024-08-29 RX ADMIN — HYDROMORPHONE HYDROCHLORIDE 1 MG: 1 INJECTION, SOLUTION INTRAMUSCULAR; INTRAVENOUS; SUBCUTANEOUS at 19:07

## 2024-08-29 RX ADMIN — ACETAMINOPHEN 975 MG: 325 TABLET ORAL at 19:10

## 2024-08-29 ASSESSMENT — ACTIVITIES OF DAILY LIVING (ADL)
ADLS_ACUITY_SCORE: 35
ADLS_ACUITY_SCORE: 37

## 2024-08-29 NOTE — ED TRIAGE NOTES
Pt had a blood transfusion at clinic today, started having pain afterwards. Left leg specifically hurts right now, 10/10. Generalized body ache. Pt took 10mg of oral oxycodone before the clinic appointment. Pt had 1 dose of 1mg IV dilaudid at clinic. Pt tearful during triage.      Triage Assessment (Adult)       Row Name 08/29/24 1831          Triage Assessment    Airway WDL WDL        Respiratory WDL    Respiratory WDL WDL        Cardiac WDL    Cardiac WDL WDL        Peripheral/Neurovascular WDL    Peripheral Neurovascular WDL WDL        Cognitive/Neuro/Behavioral WDL    Cognitive/Neuro/Behavioral WDL WDL

## 2024-08-29 NOTE — PROGRESS NOTES
Infusion Nursing Note:  Jennifer Cervantes presents today for RBC transfusion.    Patient seen by provider today: No   present during visit today: Not Applicable.    Note: O2 sat 86% on room air. 2L NC administered. O2 sat in mid 90s. 1unit RBC transfused for hgb of 6.4.  Pt tolerated tranfsusion.       Intravenous Access:  Implanted Port.    Treatment Conditions:  Lab Results   Component Value Date    HGB 6.4 (LL) 08/28/2024    WBC 16.9 (H) 08/28/2024    ANEU 8.1 08/25/2024    ANEUTAUTO 13.5 (H) 08/28/2024     08/28/2024        Results reviewed, labs MET treatment parameters, ok to proceed with treatment.      Post Infusion Assessment:  Patient tolerated infusion without incident. O2 sat mid 90s on room air. Post transfusion, pt crying, c/o 8/10 pain in left leg. Provider notified. 1mg IV dilaudid administered. Pt states leg feels better.   Access discontinued per protocol.       Discharge Plan:   Patient discharged in stable condition accompanied by: self.  Departure Mode: Ambulatory.      Yao Cordero RN

## 2024-08-29 NOTE — PROGRESS NOTES
Infusion Nursing Note:  Jennifer Cervantes presents today for IVF and pain meds.    Patient seen by provider today: No   present during visit today: Not Applicable.    Note: Jennifer here with c/o 10/10 generalized pain as well as in pelvis and abdomen, reports emesis x2 at home. Zofran administered. Pt received total of 8mg IV zofran, 50mg PO benadryl, 1L LR bolus, 30mg IV toradol, and 1mg x3 doses IV dilaudid.      Intravenous Access:  Implanted Port.    Post Infusion Assessment:  Patient tolerated infusion without incident.  Blood return noted pre and post infusion.  Site patent and intact, free from redness, edema or discomfort.  Access discontinued per protocol.       Discharge Plan:   Patient discharged in stable condition accompanied by: self.  Departure Mode: Ambulatory.      Allison Bowman RN

## 2024-08-30 NOTE — ED PROVIDER NOTES
[facility] EMERGENCY DEPARTMENT ([facility])    8/29/24          History     Chief Complaint   Patient presents with    Sickle Cell Pain Crisis     Pt had a blood transfusion at a clinic today, started having pain afterwards. Left leg hurts 10/10. Generalized body ache. Pt took 10mg oral oxycodone before the appointment. Pt had 1 dose of 1 mg IV dilaudid at clinic.     SHEILA Cervantes is a 24 year old female who with PMH of sickle cell, anxiety, CVA, depression, right hemiplegia, migraine, asthma, cognitive delay who presents to the emergency department with sickle cell pain crisis.     As per triage note:   Patient had a blood transfusion at clinic today and started having pain afterwards. Left leg specifically hurts right now, 10/10. Generalized body ache. Patient took 10mg of oral oxycodone before the clinic appointment. Patient had 1 dose of 1mg IV dilaudid at clinic. Patient was tearful during triage.     Patient has been having pain after blood transfusion. The pain is present today in her lower limbs. She normally gets pain after transfusions and she is not worried about any blood clots in her lower limbs. She had an ultrasound recently to make sure that there was no blood clots, so she believes that she does not need an ultrasound today. Patient states that she was admitted here two weeks ago because she was having trouble breathing.  This has improved since the blood transfusion and her O2 levels are in the 90s currently.  Patient states they normally treat her just like a usual sickle cell pain flare when she comes in with pain after a blood transfusion.  Patient denies rash, itchiness, fever, or history of allergic reaction to blood transfusions.    US venous left lower extremity with doppler on 8/25/24:   IMPRESSION: No evidence of thrombus in the major veins of the left lower extremity.     Past Medical History  Past Medical History:   Diagnosis Date    Anxiety     Bleeding disorder (H24)      Blood clotting disorder (H24)     Cerebral infarction (H) 2015    Cognitive developmental delay     low IQ. 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 side (H)     right hand contractures    Iron overload due to repeated red blood cell transfusions     Migraines     Multiple subsegmental pulmonary emboli without acute cor pulmonale (H) 02/01/2021    Oppositional defiant behavior     Presence of intrauterine contraceptive device 2/18/2020    Superficial venous thrombosis of arm, right 03/25/2021    Uncomplicated asthma      Past Surgical History:   Procedure Laterality Date    AS INSERT TUNNELED CV 2 CATH W/O PORT/PUMP      CHOLECYSTECTOMY      CV RIGHT HEART CATH MEASUREMENTS RECORDED N/A 11/18/2021    Procedure: Right Heart Cath;  Surgeon: Jackson Stauffer MD;  Location:  HEART CARDIAC CATH LAB    INSERT PORT VASCULAR ACCESS Left 4/21/2021    Procedure: INSERTION, VASCULAR ACCESS PORT (NOT SURE ON SIDE UNTIL REMOVAL);  Surgeon: Rajan More MD;  Location: [facility] OR    IR CHEST PORT PLACEMENT > 5 YRS OF AGE  4/21/2021    IR CVC NON TUNNEL LINE REMOVAL  5/6/2021    IR CVC NON TUNNEL PLACEMENT > 5 YRS  04/07/2020    IR CVC NON TUNNEL PLACEMENT > 5 YRS  4/30/2021    IR CVC NON TUNNEL PLACEMENT > 5 YRS  9/7/2022    IR PORT REMOVAL LEFT  4/21/2021    REMOVE PORT VASCULAR ACCESS Left 4/21/2021    Procedure: REMOVAL, VASCULAR ACCESS PORT LEFT;  Surgeon: Rajan More MD;  Location: [facility] OR    REPAIR TENDON ELBOW Right 10/02/2019    Procedure: Right Elbow Flexor Lengthening, Flexor Pronator Slide Of Wrist and Finger, Thumb Adductor Lengthening;  Surgeon: Anai Franco MD;  Location: [facility] OR    TONSILLECTOMY Bilateral 10/02/2019    Procedure: Bilateral Tonsillectomy;  Surgeon: Farhana Guy MD;  Location: [facility] OR    ZC BREAST REDUCTION (INCLUDES LIPO) TIER 3 Bilateral  04/23/2019     acetaminophen (TYLENOL) 325 MG tablet  albuterol (PROAIR HFA/PROVENTIL HFA/VENTOLIN HFA) 108 (90 Base) MCG/ACT inhaler  albuterol (PROVENTIL) (2.5 MG/3ML) 0.083% neb solution  aspirin (ASA) 81 MG chewable tablet  budesonide-formoterol (SYMBICORT) 160-4.5 MCG/ACT Inhaler  deferasirox (JADENU) 360 MG tablet  EPINEPHrine (ANY BX GENERIC EQUIV) 0.3 MG/0.3ML injection 2-pack  gabapentin (NEURONTIN) 300 MG capsule  hydroxyurea (HYDREA) 500 MG capsule  ibuprofen (ADVIL/MOTRIN) 600 MG tablet  ibuprofen (ADVIL/MOTRIN) 800 MG tablet  melatonin 5 MG tablet  methocarbamol (ROBAXIN) 750 MG tablet  naloxone (NARCAN) 4 MG/0.1ML nasal spray  naloxone (NARCAN) 4 MG/0.1ML nasal spray  omeprazole (PRILOSEC) 20 MG DR capsule  ondansetron (ZOFRAN ODT) 4 MG ODT tab  ondansetron (ZOFRAN ODT) 8 MG ODT tab      Allergies   Allergen Reactions    Contrast Dye Angioedema     Hives and breathing issues    Fish-Derived Products Hives    Seafood Hives    Adhesive Tape Hives     Primipore dressing causes hives    Gadolinium     Iodinated Contrast Media      Family History  Family History   Problem Relation Age of Onset    Sickle Cell Trait Mother     Hypertension Mother     Asthma Mother     Sickle Cell Trait Father     Glaucoma No family hx of     Macular Degeneration No family hx of     Diabetes No family hx of     Gout No family hx of      Social History   Social History     Tobacco Use    Smoking status: Never     Passive exposure: Never    Smokeless tobacco: Never   Substance Use Topics    Alcohol use: Not Currently     Alcohol/week: 0.0 standard drinks of alcohol    Drug use: Never      Past medical history, past surgical history, medications, allergies, family history, and social history were reviewed with the patient. No additional pertinent items.   A complete review of systems was performed with pertinent positives and negatives noted in the HPI, and all other systems negative.    Physical Exam   BP: 113/73  Pulse:  "92  Temp: 97.9  F (36.6  C)  Resp: 18  Height: 162.6 cm (5' 4\")  Weight: 68 kg (150 lb)  SpO2: 92 %  Physical Exam  Vitals and nursing note reviewed.   Constitutional:       General: She is not in acute distress.     Appearance: Normal appearance. She is well-developed. She is not ill-appearing or diaphoretic.   HENT:      Head: Normocephalic and atraumatic.      Nose: Nose normal.      Mouth/Throat:      Mouth: Mucous membranes are moist.   Eyes:      General: No scleral icterus.     Conjunctiva/sclera: Conjunctivae normal.   Cardiovascular:      Rate and Rhythm: Normal rate.   Pulmonary:      Effort: Pulmonary effort is normal. No respiratory distress.      Breath sounds: No stridor.   Abdominal:      General: There is no distension.   Musculoskeletal:         General: No deformity. Normal range of motion.      Cervical back: Normal range of motion and neck supple. No rigidity.      Right lower leg: No edema.      Left lower leg: No edema.   Skin:     General: Skin is warm and dry.      Coloration: Skin is not jaundiced or pale.      Findings: No erythema or rash.   Neurological:      General: No focal deficit present.      Mental Status: She is alert and oriented to person, place, and time.   Psychiatric:         Mood and Affect: Mood normal.         Behavior: Behavior normal.           ED Course, Procedures, & Data      Procedures                Results for orders placed or performed in visit on 08/29/24   Prepare red blood cells (unit)     Status: None   Result Value Ref Range    Blood Component Type Red Blood Cells     Product Code [id number]     Unit Status Transfused     Unit Number [id number]     CROSSMATCH Compatible     CODING SYSTEM [id number]     ISSUE DATE AND TIME [id number]     UNIT ABO/RH O+     UNIT TYPE ISBT 5100      Medications   sodium chloride (PF) 0.9% PF flush 10-20 mL (has no administration in time range)   sodium chloride (PF) 0.9% PF flush 10-20 mL (has no administration in time range) "   sodium chloride (PF) 0.9% PF flush 10-20 mL (has no administration in time range)   heparin lock flush 10 unit/mL injection 5-10 mL (has no administration in time range)   heparin lock flush 10 unit/mL injection 5-10 mL (has no administration in time range)   heparin lock flush 100 unit/mL injection 5-10 mL (5 mLs Intracatheter $Given 8/29/24 2156)   HYDROmorphone (DILAUDID) injection 1 mg (1 mg Intravenous $Given 8/29/24 2127)   acetaminophen (TYLENOL) tablet 975 mg (975 mg Oral $Given 8/29/24 1910)   ondansetron (ZOFRAN) injection 4 mg (4 mg Intravenous $Given 8/29/24 1915)   lactated ringers BOLUS 1,000 mL (0 mLs Intravenous Stopped 8/29/24 2147)   ketorolac (TORADOL) injection 30 mg (30 mg Intravenous $Given 8/29/24 2031)     Labs Ordered and Resulted from Time of ED Arrival to Time of ED Departure - No data to display  No orders to display          Critical care was not performed.     Medical Decision Making  The patient's presentation was of high complexity (a chronic illness severe exacerbation, progression, or side effect of treatment).    The patient's evaluation involved:  review of 3+ test result(s) ordered prior to this encounter (see separate area of note for details)    The patient's management necessitated moderate risk (prescription drug management including medications given in the ED), high risk (a parenteral controlled substance), high risk (drug therapy requiring intensive monitoring (see separate area of note for details)), and high risk (a decision regarding hospitalization).    Assessment & Plan    Jennifer Cervantes is a 24 year old female who with PMH of sickle cell, anxiety, CVA, depression, right hemiplegia, migraine, asthma, cognitive delay who presents to the emergency department with sickle cell pain crisis.     Ddx: Sickle cell pain crisis, extremity pain, transfusion reaction    Patient's vital signs normal on arrival.  She states this is her typical pain following a transfusion.  She  does not think further evaluation is needed to search for an alternative cause for her pain such as DVT or infection.  Patient's pain improved after treatment with Dilaudid, Tylenol, Zofran, lactated Ringer's, Toradol.  Discharged home.      I have reviewed the nursing notes. I have reviewed the findings, diagnosis, plan and need for follow up with the patient.    Discharge Medication List as of 8/29/2024  9:48 PM          Final diagnoses:   Bilateral leg pain   I, TIM OTERO, am serving as a trained medical scribe to document services personally performed by Jackie Dale MD, based on the provider's statements to me.     IJackie MD, was physically present and have reviewed and verified the accuracy of this note documented by TIM OTERO.     Jackie Dale MD.   [facility] EMERGENCY DEPARTMENT  8/29/2024     Jackie Dale MD  09/05/24 2366

## 2024-08-30 NOTE — DISCHARGE INSTRUCTIONS
Please make an appointment to follow up with Hematology Oncology Clinic (phone: [phone]) as soon as possible.

## 2024-08-31 ENCOUNTER — APPOINTMENT (OUTPATIENT)
Dept: ULTRASOUND IMAGING | Facility: CLINIC | Age: 24
End: 2024-08-31
Attending: FAMILY MEDICINE
Payer: COMMERCIAL

## 2024-08-31 ENCOUNTER — HOSPITAL ENCOUNTER (EMERGENCY)
Facility: CLINIC | Age: 24
Discharge: HOME | End: 2024-08-31
Attending: FAMILY MEDICINE | Admitting: FAMILY MEDICINE
Payer: COMMERCIAL

## 2024-08-31 VITALS
SYSTOLIC BLOOD PRESSURE: 121 MMHG | TEMPERATURE: 97.8 F | HEIGHT: 64 IN | DIASTOLIC BLOOD PRESSURE: 78 MMHG | RESPIRATION RATE: 16 BRPM | BODY MASS INDEX: 25.78 KG/M2 | OXYGEN SATURATION: 84 % | WEIGHT: 151 LBS | HEART RATE: 87 BPM

## 2024-08-31 DIAGNOSIS — D57.00 SICKLE-CELL DISEASE WITH PAIN (H): ICD-10-CM

## 2024-08-31 LAB
ALBUMIN SERPL BCG-MCNC: 4.7 G/DL (ref 3.5–5.2)
ALP SERPL-CCNC: 56 U/L (ref 40–150)
ALT SERPL W P-5'-P-CCNC: 39 U/L (ref 0–50)
ANION GAP SERPL CALCULATED.3IONS-SCNC: 11 MMOL/L (ref 7–15)
AST SERPL W P-5'-P-CCNC: 62 U/L (ref 0–45)
BASOPHILS # BLD AUTO: 0.2 10E3/UL (ref 0–0.2)
BASOPHILS NFR BLD AUTO: 2 %
BILIRUB SERPL-MCNC: 3.7 MG/DL
BUN SERPL-MCNC: 12.9 MG/DL (ref 6–20)
CALCIUM SERPL-MCNC: 8.7 MG/DL (ref 8.8–10.4)
CHLORIDE SERPL-SCNC: 104 MMOL/L (ref 98–107)
CREAT SERPL-MCNC: 0.54 MG/DL (ref 0.51–0.95)
EGFRCR SERPLBLD CKD-EPI 2021: >90 ML/MIN/1.73M2
EOSINOPHIL # BLD AUTO: 0.4 10E3/UL (ref 0–0.7)
EOSINOPHIL NFR BLD AUTO: 3 %
ERYTHROCYTE [DISTWIDTH] IN BLOOD BY AUTOMATED COUNT: 25.1 % (ref 10–15)
GLUCOSE SERPL-MCNC: 87 MG/DL (ref 70–99)
HCO3 SERPL-SCNC: 23 MMOL/L (ref 22–29)
HCT VFR BLD AUTO: 20.8 % (ref 35–47)
HGB BLD-MCNC: 7.6 G/DL (ref 11.7–15.7)
IMM GRANULOCYTES # BLD: 0.1 10E3/UL
IMM GRANULOCYTES NFR BLD: 1 %
LYMPHOCYTES # BLD AUTO: 2 10E3/UL (ref 0.8–5.3)
LYMPHOCYTES NFR BLD AUTO: 16 %
MCH RBC QN AUTO: 30.5 PG (ref 26.5–33)
MCHC RBC AUTO-ENTMCNC: 36.5 G/DL (ref 31.5–36.5)
MCV RBC AUTO: 84 FL (ref 78–100)
MONOCYTES # BLD AUTO: 1.3 10E3/UL (ref 0–1.3)
MONOCYTES NFR BLD AUTO: 11 %
NEUTROPHILS # BLD AUTO: 8.7 10E3/UL (ref 1.6–8.3)
NEUTROPHILS NFR BLD AUTO: 67 %
NRBC # BLD AUTO: 0.4 10E3/UL
NRBC BLD AUTO-RTO: 3 /100
PLATELET # BLD AUTO: 493 10E3/UL (ref 150–450)
POTASSIUM SERPL-SCNC: 4 MMOL/L (ref 3.4–5.3)
PROT SERPL-MCNC: 7.3 G/DL (ref 6.4–8.3)
RBC # BLD AUTO: 2.49 10E6/UL (ref 3.8–5.2)
RETICS # AUTO: 0.52 10E6/UL (ref 0.03–0.1)
RETICS/RBC NFR AUTO: 19.6 % (ref 0.5–2)
SODIUM SERPL-SCNC: 138 MMOL/L (ref 135–145)
WBC # BLD AUTO: 12.6 10E3/UL (ref 4–11)

## 2024-08-31 PROCEDURE — 85045 AUTOMATED RETICULOCYTE COUNT: CPT | Performed by: FAMILY MEDICINE

## 2024-08-31 PROCEDURE — 80053 COMPREHEN METABOLIC PANEL: CPT | Performed by: FAMILY MEDICINE

## 2024-08-31 PROCEDURE — 85025 COMPLETE CBC W/AUTO DIFF WBC: CPT | Performed by: FAMILY MEDICINE

## 2024-08-31 PROCEDURE — 99284 EMERGENCY DEPT VISIT MOD MDM: CPT | Mod: 25 | Performed by: FAMILY MEDICINE

## 2024-08-31 PROCEDURE — 96376 TX/PRO/DX INJ SAME DRUG ADON: CPT | Performed by: FAMILY MEDICINE

## 2024-08-31 PROCEDURE — 36591 DRAW BLOOD OFF VENOUS DEVICE: CPT | Performed by: FAMILY MEDICINE

## 2024-08-31 PROCEDURE — 96361 HYDRATE IV INFUSION ADD-ON: CPT | Performed by: FAMILY MEDICINE

## 2024-08-31 PROCEDURE — 250N000011 HC RX IP 250 OP 636: Performed by: FAMILY MEDICINE

## 2024-08-31 PROCEDURE — 93971 EXTREMITY STUDY: CPT | Mod: LT

## 2024-08-31 PROCEDURE — 99285 EMERGENCY DEPT VISIT HI MDM: CPT | Performed by: FAMILY MEDICINE

## 2024-08-31 PROCEDURE — 96375 TX/PRO/DX INJ NEW DRUG ADDON: CPT | Performed by: FAMILY MEDICINE

## 2024-08-31 PROCEDURE — 96374 THER/PROPH/DIAG INJ IV PUSH: CPT | Performed by: FAMILY MEDICINE

## 2024-08-31 PROCEDURE — 258N000003 HC RX IP 258 OP 636: Performed by: FAMILY MEDICINE

## 2024-08-31 RX ORDER — ONDANSETRON 2 MG/ML
4 INJECTION INTRAMUSCULAR; INTRAVENOUS EVERY 30 MIN PRN
Status: DISCONTINUED | OUTPATIENT
Start: 2024-08-31 | End: 2024-08-31 | Stop reason: HOSPADM

## 2024-08-31 RX ORDER — HEPARIN SODIUM (PORCINE) LOCK FLUSH IV SOLN 100 UNIT/ML 100 UNIT/ML
100 SOLUTION INTRAVENOUS ONCE
Status: COMPLETED | OUTPATIENT
Start: 2024-08-31 | End: 2024-08-31

## 2024-08-31 RX ORDER — KETOROLAC TROMETHAMINE 30 MG/ML
30 INJECTION, SOLUTION INTRAMUSCULAR; INTRAVENOUS ONCE
Status: COMPLETED | OUTPATIENT
Start: 2024-08-31 | End: 2024-08-31

## 2024-08-31 RX ADMIN — SODIUM CHLORIDE, POTASSIUM CHLORIDE, SODIUM LACTATE AND CALCIUM CHLORIDE 1000 ML: 600; 310; 30; 20 INJECTION, SOLUTION INTRAVENOUS at 09:36

## 2024-08-31 RX ADMIN — HYDROMORPHONE HYDROCHLORIDE 1 MG: 1 INJECTION, SOLUTION INTRAMUSCULAR; INTRAVENOUS; SUBCUTANEOUS at 09:44

## 2024-08-31 RX ADMIN — HEPARIN 100 UNITS: 100 SYRINGE at 12:54

## 2024-08-31 RX ADMIN — HYDROMORPHONE HYDROCHLORIDE 1 MG: 1 INJECTION, SOLUTION INTRAMUSCULAR; INTRAVENOUS; SUBCUTANEOUS at 11:54

## 2024-08-31 RX ADMIN — HYDROMORPHONE HYDROCHLORIDE 1 MG: 1 INJECTION, SOLUTION INTRAMUSCULAR; INTRAVENOUS; SUBCUTANEOUS at 10:51

## 2024-08-31 RX ADMIN — ONDANSETRON 4 MG: 2 INJECTION INTRAMUSCULAR; INTRAVENOUS at 12:01

## 2024-08-31 RX ADMIN — KETOROLAC TROMETHAMINE 30 MG: 30 INJECTION, SOLUTION INTRAMUSCULAR at 09:37

## 2024-08-31 ASSESSMENT — ACTIVITIES OF DAILY LIVING (ADL)
ADLS_ACUITY_SCORE: 37
ADLS_ACUITY_SCORE: 35
ADLS_ACUITY_SCORE: 35
ADLS_ACUITY_SCORE: 37

## 2024-08-31 NOTE — ED NOTES
Pt stts she does not want to recheck her O 2 Sat.  States she just wants to go.  MD updated.  Pt states she has O 2 @ home if she needs it.

## 2024-08-31 NOTE — ED TRIAGE NOTES
Pt reports a history of similar pain after transfusions     Triage Assessment (Adult)       Row Name 08/31/24 0748          Triage Assessment    Airway WDL WDL        Respiratory WDL    Respiratory WDL WDL        Skin Circulation/Temperature WDL    Skin Circulation/Temperature WDL WDL        Cardiac WDL    Cardiac WDL WDL        Peripheral/Neurovascular WDL    Peripheral Neurovascular WDL X  Lleg pain, R hand contracture        Cognitive/Neuro/Behavioral WDL    Cognitive/Neuro/Behavioral WDL WDL

## 2024-08-31 NOTE — ED PROVIDER NOTES
"ED Provider Note  [facility]      History     Chief Complaint   Patient presents with    Leg Pain     Leg pain following blood transfusion on Thursday. Pt has sickle cell     HPI  Jennifer Cervantes is a 24 year old female who has a history of sickle cell disease, CVA with residual right hemiplegia, migraines, mild cognitive delay and relatively frequent sickle cell pain crisis with a care plan in the chart, now presenting with left leg pain.  Patient states she recently had a blood transfusion due to severe anemia following a hospitalization related to hypoxia.  The day of the blood transfusion she started to experience diffuse bodyaches which were worse than the left leg.  She was actually seen in the ED that day (8/29/2024) and was treated for symptoms with her care plan no other acute findings.  Stated she was feeling better somewhat but after getting home the pain returned.  Patient reports that in the past after receiving transfusion she frequently has episodes which are difficult to manage with her home pain medications, and that gradually this comes better over several days but her home meds were not effective last night prompting her visit here today.  She denies fever chills, shortness of breath, chest pain, headache or any other acute concerns.  She states the pain in her leg is generalized and feels \"deep\" in the leg similar to typical pain crisis.    Past Medical History  Past Medical History:   Diagnosis Date    Anxiety     Bleeding disorder (H24)     Blood clotting disorder (H24)     Cerebral infarction (H) 2015    Cognitive developmental delay     low IQ. 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 side (H)     right hand contractures    Iron overload due to repeated red blood cell transfusions     Migraines     Multiple subsegmental pulmonary emboli " without acute cor pulmonale (H) 02/01/2021    Oppositional defiant behavior     Presence of intrauterine contraceptive device 2/18/2020    Superficial venous thrombosis of arm, right 03/25/2021    Uncomplicated asthma      Past Surgical History:   Procedure Laterality Date    AS INSERT TUNNELED CV 2 CATH W/O PORT/PUMP      CHOLECYSTECTOMY      CV RIGHT HEART CATH MEASUREMENTS RECORDED N/A 11/18/2021    Procedure: Right Heart Cath;  Surgeon: Jackson Stauffer MD;  Location:  HEART CARDIAC CATH LAB    INSERT PORT VASCULAR ACCESS Left 4/21/2021    Procedure: INSERTION, VASCULAR ACCESS PORT (NOT SURE ON SIDE UNTIL REMOVAL);  Surgeon: Rajan More MD;  Location: [facility] OR    IR CHEST PORT PLACEMENT > 5 YRS OF AGE  4/21/2021    IR CVC NON TUNNEL LINE REMOVAL  5/6/2021    IR CVC NON TUNNEL PLACEMENT > 5 YRS  04/07/2020    IR CVC NON TUNNEL PLACEMENT > 5 YRS  4/30/2021    IR CVC NON TUNNEL PLACEMENT > 5 YRS  9/7/2022    IR PORT REMOVAL LEFT  4/21/2021    REMOVE PORT VASCULAR ACCESS Left 4/21/2021    Procedure: REMOVAL, VASCULAR ACCESS PORT LEFT;  Surgeon: Rajan More MD;  Location: [facility] OR    REPAIR TENDON ELBOW Right 10/02/2019    Procedure: Right Elbow Flexor Lengthening, Flexor Pronator Slide Of Wrist and Finger, Thumb Adductor Lengthening;  Surgeon: Anai Franco MD;  Location: [facility] OR    TONSILLECTOMY Bilateral 10/02/2019    Procedure: Bilateral Tonsillectomy;  Surgeon: Farhana Guy MD;  Location: [facility] OR    ZZC BREAST REDUCTION (INCLUDES LIPO) TIER 3 Bilateral 04/23/2019     acetaminophen (TYLENOL) 325 MG tablet  albuterol (PROAIR HFA/PROVENTIL HFA/VENTOLIN HFA) 108 (90 Base) MCG/ACT inhaler  albuterol (PROVENTIL) (2.5 MG/3ML) 0.083% neb solution  aspirin (ASA) 81 MG chewable tablet  budesonide-formoterol (SYMBICORT) 160-4.5 MCG/ACT Inhaler  deferasirox (JADENU) 360 MG tablet  gabapentin (NEURONTIN) 300 MG capsule  hydroxyurea (HYDREA) 500 MG capsule  ibuprofen (ADVIL/MOTRIN) 800 MG  "tablet  methocarbamol (ROBAXIN) 750 MG tablet  omeprazole (PRILOSEC) 20 MG DR capsule  ondansetron (ZOFRAN ODT) 4 MG ODT tab  ondansetron (ZOFRAN ODT) 8 MG ODT tab  EPINEPHrine (ANY BX GENERIC EQUIV) 0.3 MG/0.3ML injection 2-pack  ibuprofen (ADVIL/MOTRIN) 600 MG tablet  melatonin 5 MG tablet  naloxone (NARCAN) 4 MG/0.1ML nasal spray  naloxone (NARCAN) 4 MG/0.1ML nasal spray      Allergies   Allergen Reactions    Contrast Dye Angioedema     Hives and breathing issues    Fish-Derived Products Hives    Seafood Hives    Adhesive Tape Hives     Primipore dressing causes hives    Gadolinium     Iodinated Contrast Media      Family History  Family History   Problem Relation Age of Onset    Sickle Cell Trait Mother     Hypertension Mother     Asthma Mother     Sickle Cell Trait Father     Glaucoma No family hx of     Macular Degeneration No family hx of     Diabetes No family hx of     Gout No family hx of      Social History   Social History     Tobacco Use    Smoking status: Never     Passive exposure: Never    Smokeless tobacco: Never   Substance Use Topics    Alcohol use: Not Currently     Alcohol/week: 0.0 standard drinks of alcohol    Drug use: Never      A medically appropriate review of systems was performed with pertinent positives and negatives noted in the HPI, and all other systems negative.    Physical Exam   BP: 124/78  Pulse: 87  Temp: 98.1  F (36.7  C)  Resp: 16  Height: 162.6 cm (5' 4\")  Weight: 68.5 kg (151 lb)  SpO2: 94 %  Physical Exam  Vitals and nursing note reviewed.   Constitutional:       General: She is not in acute distress.     Appearance: Normal appearance. She is not diaphoretic.   HENT:      Head: Atraumatic.      Mouth/Throat:      Mouth: Mucous membranes are moist.   Eyes:      General: No scleral icterus.     Conjunctiva/sclera: Conjunctivae normal.   Cardiovascular:      Rate and Rhythm: Normal rate.      Heart sounds: Normal heart sounds.   Pulmonary:      Effort: No respiratory distress. "      Breath sounds: Normal breath sounds.   Abdominal:      General: Abdomen is flat.   Musculoskeletal:      Cervical back: Neck supple.      Left upper leg: Tenderness present.      Comments: Grossly normal appearance of the left lower extremity.  It is not specifically tender to the touch, there is no overlying erythema, swelling, warmth or joint effusions.  CMS is intact.   Skin:     General: Skin is warm.      Findings: No rash.   Neurological:      Mental Status: She is alert and oriented to person, place, and time. Mental status is at baseline.      Comments: Patient has chronic right-sided hemiplegia with some atrophy of the muscles consistent with prior exam documented in [facility]           ED Course, Procedures, & Data      Procedures                 Results for orders placed or performed during the hospital encounter of 08/31/24   US Lower Extremity Venous Duplex Left     Status: None    Narrative    EXAM: ULTRASOUND LOWER EXTREMITY VENOUS DUPLEX LEFT  LOCATION: [facility]  DATE: 08/31/2024    INDICATION: Leg pain, history of PE, history of sickle cell.  COMPARISON: Ultrasound 08/18/2024.  TECHNIQUE: Venous Duplex ultrasound of the left lower extremity with and without compression, augmentation and duplex. Color flow and spectral Doppler with waveform analysis performed.    FINDINGS: Exam includes the common femoral, femoral, popliteal, and contralateral common femoral veins as well as segmentally visualized deep calf veins and greater saphenous vein.     LEFT: No deep vein thrombosis. No superficial thrombophlebitis. No popliteal cyst.      Impression    IMPRESSION:  1.  No deep venous thrombosis in the left lower extremity.     Comprehensive metabolic panel     Status: Abnormal   Result Value Ref Range    Sodium 138 135 - 145 mmol/L    Potassium 4.0 3.4 - 5.3 mmol/L    Carbon Dioxide (CO2) 23 22 - 29 mmol/L    Anion Gap 11 7 - 15 mmol/L    Urea Nitrogen 12.9 6.0  - 20.0 mg/dL    Creatinine 0.54 0.51 - 0.95 mg/dL    GFR Estimate >90 >60 mL/min/1.73m2    Calcium 8.7 (L) 8.8 - 10.4 mg/dL    Chloride 104 98 - 107 mmol/L    Glucose 87 70 - 99 mg/dL    Alkaline Phosphatase 56 40 - 150 U/L    AST 62 (H) 0 - 45 U/L    ALT 39 0 - 50 U/L    Protein Total 7.3 6.4 - 8.3 g/dL    Albumin 4.7 3.5 - 5.2 g/dL    Bilirubin Total 3.7 (H) <=1.2 mg/dL   Reticulocyte count     Status: Abnormal   Result Value Ref Range    % Reticulocyte 19.6 (H) 0.5 - 2.0 %    Absolute Reticulocyte 0.520 (H) 0.025 - 0.095 10e6/uL   CBC with platelets and differential     Status: Abnormal   Result Value Ref Range    WBC Count 12.6 (H) 4.0 - 11.0 10e3/uL    RBC Count 2.49 (L) 3.80 - 5.20 10e6/uL    Hemoglobin 7.6 (L) 11.7 - 15.7 g/dL    Hematocrit 20.8 (L) 35.0 - 47.0 %    MCV 84 78 - 100 fL    MCH 30.5 26.5 - 33.0 pg    MCHC 36.5 31.5 - 36.5 g/dL    RDW 25.1 (H) 10.0 - 15.0 %    Platelet Count 493 (H) 150 - 450 10e3/uL    % Neutrophils 67 %    % Lymphocytes 16 %    % Monocytes 11 %    % Eosinophils 3 %    % Basophils 2 %    % Immature Granulocytes 1 %    NRBCs per 100 WBC 3 (H) <1 /100    Absolute Neutrophils 8.7 (H) 1.6 - 8.3 10e3/uL    Absolute Lymphocytes 2.0 0.8 - 5.3 10e3/uL    Absolute Monocytes 1.3 0.0 - 1.3 10e3/uL    Absolute Eosinophils 0.4 0.0 - 0.7 10e3/uL    Absolute Basophils 0.2 0.0 - 0.2 10e3/uL    Absolute Immature Granulocytes 0.1 <=0.4 10e3/uL    Absolute NRBCs 0.4 10e3/uL   CBC with platelets differential     Status: Abnormal    Narrative    The following orders were created for panel order CBC with platelets differential.  Procedure                               Abnormality         Status                     ---------                               -----------         ------                     CBC with platelets and d...[[id number]]  Abnormal            Final result                 Please view results for these tests on the individual orders.     Medications   ondansetron (ZOFRAN) injection 4 mg  (4 mg Intravenous $Given 8/31/24 1201)   heparin lock flush 100 unit/mL injection 100 Units (has no administration in time range)   lactated ringers BOLUS 1,000 mL (0 mLs Intravenous Stopped 8/31/24 1100)   HYDROmorphone (DILAUDID) injection 1 mg (1 mg Intravenous $Given 8/31/24 0944)   ketorolac (TORADOL) injection 30 mg (30 mg Intravenous $Given 8/31/24 0937)   HYDROmorphone (DILAUDID) injection 1 mg (1 mg Intravenous $Given 8/31/24 1051)   HYDROmorphone (DILAUDID) injection 1 mg (1 mg Intravenous $Given 8/31/24 1154)     Labs Ordered and Resulted from Time of ED Arrival to Time of ED Departure   COMPREHENSIVE METABOLIC PANEL - Abnormal       Result Value    Sodium 138      Potassium 4.0      Carbon Dioxide (CO2) 23      Anion Gap 11      Urea Nitrogen 12.9      Creatinine 0.54      GFR Estimate >90      Calcium 8.7 (*)     Chloride 104      Glucose 87      Alkaline Phosphatase 56      AST 62 (*)     ALT 39      Protein Total 7.3      Albumin 4.7      Bilirubin Total 3.7 (*)    RETICULOCYTE COUNT - Abnormal    % Reticulocyte 19.6 (*)     Absolute Reticulocyte 0.520 (*)    CBC WITH PLATELETS AND DIFFERENTIAL - Abnormal    WBC Count 12.6 (*)     RBC Count 2.49 (*)     Hemoglobin 7.6 (*)     Hematocrit 20.8 (*)     MCV 84      MCH 30.5      MCHC 36.5      RDW 25.1 (*)     Platelet Count 493 (*)     % Neutrophils 67      % Lymphocytes 16      % Monocytes 11      % Eosinophils 3      % Basophils 2      % Immature Granulocytes 1      NRBCs per 100 WBC 3 (*)     Absolute Neutrophils 8.7 (*)     Absolute Lymphocytes 2.0      Absolute Monocytes 1.3      Absolute Eosinophils 0.4      Absolute Basophils 0.2      Absolute Immature Granulocytes 0.1      Absolute NRBCs 0.4       US Lower Extremity Venous Duplex Left   Final Result   IMPRESSION:   1.  No deep venous thrombosis in the left lower extremity.                Critical care was not performed.     Medical Decision Making  The patient's presentation was of moderate  complexity (a chronic illness mild to moderate exacerbation, progression, or side effect of treatment).    The patient's evaluation involved:  review of external note(s) from 3+ sources (review of ED visits from 8/29/2024 and 8/24/2024 as well as infusion therapy visit on 8/29/2024)  review of 3+ test result(s) ordered prior to this encounter (prior hemoglobins reviewed, prior reticulocyte counts)  ordering and/or review of 3+ test(s) in this encounter (see separate area of note for details)  independent interpretation of testing performed by another health professional (left lower extremity ultrasound images personally reviewed and reviewed radiologist interpretation)    The patient's management necessitated moderate risk (prescription drug management including medications given in the ED), high risk (a parenteral controlled substance), and high risk (a decision regarding hospitalization).    Assessment & Plan    24-year-old female with a history of sickle cell disease who had a blood transfusion on 8/29/2024 related to severe anemia, and subsequently developed myalgias and now localized to left lower extremity pain.  There is a history of prior similar pain crisis related to receiving transfusion per patient report.  Broad differential diagnosis of potential etiology of sickle cell pain crisis including transfusion reaction, sepsis, DVT, cellulitis, trauma, rhabdo, or multiple other potential etiologies of exacerbation of sickle cell disease.  On exam her vital signs are normal.  She does not appear in acute distress.  She has mild generalized tenderness to the left leg with no other objective findings.  The remainder of physical exam significant only for chronic right hemiplegia.  She was treated with symptomatic medications through her care plan and a workup entertained.  Her left lower extremity ultrasound is negative for DVT, and there is no sign of cellulitis, limb ischemia compartment syndrome or other  life-threatening complication of sickle cell disease or other.  Her hemoglobin is 7.6, up from recent value of 6.6 consistent with recent transfusion.  Her reticulocyte count is stable.  She does have leukocytosis with WBC 12.6 however this appears to be chronic and her platelet count is also stable.  Her other labs with mild elevation of the total bilirubin, again this is to be baseline for this patient.  She has a care plan in which she is treated with IV fluids, IV Toradol, Zofran and incremental doses of Dilaudid 1 mg IV.  The care plan was enacted and completed at which point the patient informed me that she is feeling much better and would like to be discharged home.  I do not see indication for emergent admission based on the entire clinical scenario today.  We discussed the indications for emergency department return and follow-up.  Stable for discharge.      I have reviewed the nursing notes. I have reviewed the findings, diagnosis, plan and need for follow up with the patient.    New Prescriptions    No medications on file       Final diagnoses:   Sickle-cell disease with pain (H)       Ifeanyi Valdez MD  [facility] EMERGENCY DEPARTMENT  8/31/2024     Ifeanyi Valdez MD  08/31/24 1630

## 2024-08-31 NOTE — DISCHARGE INSTRUCTIONS
Please make an appointment to follow up with your hematology clinic as soon as possible unless symptoms completely resolve.

## 2024-09-03 ENCOUNTER — PATIENT OUTREACH (OUTPATIENT)
Dept: CARE COORDINATION | Facility: CLINIC | Age: 24
End: 2024-09-03
Payer: COMMERCIAL

## 2024-09-03 ENCOUNTER — NURSE TRIAGE (OUTPATIENT)
Dept: ONCOLOGY | Facility: CLINIC | Age: 24
End: 2024-09-03
Payer: COMMERCIAL

## 2024-09-03 ENCOUNTER — INFUSION THERAPY VISIT (OUTPATIENT)
Dept: TRANSPLANT | Facility: CLINIC | Age: 24
End: 2024-09-03
Payer: COMMERCIAL

## 2024-09-03 VITALS
TEMPERATURE: 98.3 F | HEART RATE: 87 BPM | OXYGEN SATURATION: 96 % | DIASTOLIC BLOOD PRESSURE: 80 MMHG | SYSTOLIC BLOOD PRESSURE: 128 MMHG | RESPIRATION RATE: 18 BRPM

## 2024-09-03 DIAGNOSIS — D57.00 SICKLE CELL PAIN CRISIS (H): ICD-10-CM

## 2024-09-03 DIAGNOSIS — G81.10 SPASTIC HEMIPLEGIA, UNSPECIFIED ETIOLOGY, UNSPECIFIED LATERALITY (H): ICD-10-CM

## 2024-09-03 DIAGNOSIS — D57.00 SICKLE CELL PAIN CRISIS (H): Primary | ICD-10-CM

## 2024-09-03 PROCEDURE — 250N000011 HC RX IP 250 OP 636: Performed by: PEDIATRICS

## 2024-09-03 PROCEDURE — 250N000013 HC RX MED GY IP 250 OP 250 PS 637: Performed by: PEDIATRICS

## 2024-09-03 PROCEDURE — 96376 TX/PRO/DX INJ SAME DRUG ADON: CPT

## 2024-09-03 PROCEDURE — 96361 HYDRATE IV INFUSION ADD-ON: CPT

## 2024-09-03 PROCEDURE — 258N000003 HC RX IP 258 OP 636: Performed by: PEDIATRICS

## 2024-09-03 PROCEDURE — 96375 TX/PRO/DX INJ NEW DRUG ADDON: CPT

## 2024-09-03 PROCEDURE — 96374 THER/PROPH/DIAG INJ IV PUSH: CPT

## 2024-09-03 RX ORDER — ONDANSETRON 2 MG/ML
8 INJECTION INTRAMUSCULAR; INTRAVENOUS EVERY 6 HOURS PRN
Status: DISCONTINUED | OUTPATIENT
Start: 2024-09-03 | End: 2024-09-03 | Stop reason: HOSPADM

## 2024-09-03 RX ORDER — HEPARIN SODIUM,PORCINE 10 UNIT/ML
5 VIAL (ML) INTRAVENOUS
Status: CANCELLED | OUTPATIENT
Start: 2024-10-01

## 2024-09-03 RX ORDER — DIPHENHYDRAMINE HCL 25 MG
50 CAPSULE ORAL
Status: CANCELLED
Start: 2024-10-01

## 2024-09-03 RX ORDER — HEPARIN SODIUM (PORCINE) LOCK FLUSH IV SOLN 100 UNIT/ML 100 UNIT/ML
5 SOLUTION INTRAVENOUS
Status: CANCELLED | OUTPATIENT
Start: 2024-10-01

## 2024-09-03 RX ORDER — KETOROLAC TROMETHAMINE 30 MG/ML
30 INJECTION, SOLUTION INTRAMUSCULAR; INTRAVENOUS ONCE
Status: COMPLETED | OUTPATIENT
Start: 2024-09-03 | End: 2024-09-03

## 2024-09-03 RX ORDER — KETOROLAC TROMETHAMINE 30 MG/ML
30 INJECTION, SOLUTION INTRAMUSCULAR; INTRAVENOUS ONCE
Status: CANCELLED
Start: 2024-10-01 | End: 2024-10-01

## 2024-09-03 RX ORDER — ONDANSETRON 4 MG/1
8 TABLET, FILM COATED ORAL
Status: CANCELLED
Start: 2024-10-01

## 2024-09-03 RX ORDER — ONDANSETRON 2 MG/ML
8 INJECTION INTRAMUSCULAR; INTRAVENOUS EVERY 6 HOURS PRN
Status: CANCELLED
Start: 2024-10-01

## 2024-09-03 RX ORDER — OXYCODONE HYDROCHLORIDE 10 MG/1
10 TABLET ORAL EVERY 4 HOURS PRN
Qty: 15 TABLET | Refills: 0 | Status: SHIPPED | OUTPATIENT
Start: 2024-09-03 | End: 2024-09-09

## 2024-09-03 RX ORDER — DIPHENHYDRAMINE HCL 25 MG
50 CAPSULE ORAL
Status: COMPLETED | OUTPATIENT
Start: 2024-09-03 | End: 2024-09-03

## 2024-09-03 RX ADMIN — ONDANSETRON 8 MG: 2 INJECTION INTRAMUSCULAR; INTRAVENOUS at 10:59

## 2024-09-03 RX ADMIN — HYDROMORPHONE HYDROCHLORIDE 1 MG: 1 INJECTION, SOLUTION INTRAMUSCULAR; INTRAVENOUS; SUBCUTANEOUS at 12:57

## 2024-09-03 RX ADMIN — HYDROMORPHONE HYDROCHLORIDE 1 MG: 1 INJECTION, SOLUTION INTRAMUSCULAR; INTRAVENOUS; SUBCUTANEOUS at 11:59

## 2024-09-03 RX ADMIN — KETOROLAC TROMETHAMINE 30 MG: 30 INJECTION, SOLUTION INTRAMUSCULAR; INTRAVENOUS at 10:59

## 2024-09-03 RX ADMIN — DIPHENHYDRAMINE HYDROCHLORIDE 50 MG: 25 CAPSULE ORAL at 10:59

## 2024-09-03 RX ADMIN — HYDROMORPHONE HYDROCHLORIDE 1 MG: 1 INJECTION, SOLUTION INTRAMUSCULAR; INTRAVENOUS; SUBCUTANEOUS at 10:59

## 2024-09-03 RX ADMIN — SODIUM CHLORIDE, POTASSIUM CHLORIDE, SODIUM LACTATE AND CALCIUM CHLORIDE 1000 ML: 600; 310; 30; 20 INJECTION, SOLUTION INTRAVENOUS at 10:59

## 2024-09-03 ASSESSMENT — PAIN SCALES - GENERAL: PAINLEVEL: SEVERE PAIN (7)

## 2024-09-03 NOTE — PROGRESS NOTES
Social Work - Transportation  [facility]    Data/Intervention:  Patient Name: Jennifer Cervantes Goes By: Jennifer    /Age: 2000 (24 year old)    Referral From: Patient  Reason for Referral:  support requested for patient transportation needs for today's appointment.  Assessment:  called [employer] to arrange ride through patient's insurance. [employer] arranged will call ride home with  [employer]. Patient will need to call [phone] when ready for return ride home.  Plan: Patient is aware of the transportation plan.  available to assist with any other needs.    CARLOS Chavez,UDAY  Hematology/Oncology Social Worker  Phone:[phone] Pager: [phone]

## 2024-09-03 NOTE — TELEPHONE ENCOUNTER
Oncology Nurse Triage - Sickle Cell Pain Crisis:    Situation: Jennifer  calling about Sickle Cell Pain Crisis, requesting to be added on for IV fluids and pain medicine    Background:     Patient's last infusion was 08/31/24 ED, had flare up from transfusion she had on 08/29.  Last clinic visit date:8/15/24 with Patricia Mantilla CNP  Does patient have active treatment plan?  Yes      Assessment of Symptoms:  Onset/Duration of symptoms: 1 day    Is it typical sickle cell pain? Yes   Location: Both legs  Character: Dull ache           Intensity: 7/10    Any radiation of pain, numbness, tingling, weakness, warmth, swelling, discoloration of arms or legs?No     Fever?No  (if yes max temperature recorded in last 24 hours):      Chest Pain Present: No     Shortness of breath: No     Other home therapies tried: HEAT/HEATING PAD and WARM BATH     Last home medication taken and when: 0600 oxycodone     Any Refills Needed?: Yes, oxycodone    Does patient have transportation & length of time to get to clinic: Yes, if something opens up early morning. If its later in the day she will need assistance with transportation.          Recommendations:       If you do not hear from the infusion center by 2pm then you will not be able to get in for an infusion today. If symptoms worsen while waiting for call back, and/or you experience fever, chills, SOB, chest pain, cough, n/v, dizziness, numbness, swelling, discoloration of extremities, then seek emergency evaluation in Emergency Department.     Please note, if you are late for your appt, you risk losing your infusion appt as it may delay another patient's infusion who arrived on time.     [id number] Secure message to Patricia Mantilla for approval    [id number] Patricia approving adding on pt to wait list.

## 2024-09-03 NOTE — PROGRESS NOTES
Infusion Nursing Note:  Jennifer Cervantes presents today for IVF/pain meds.    Patient seen by provider today: No   present during visit today: Not Applicable.    Note: VSS. Pt reports 7/10 bilateral leg pain that is her typical sickle cell pain. Otherwise pt has no acute concerns. Pt received 1L LR, 8 mg Zofran IVP, 50 mg Benadryl PO, 30 mg Toradol IVP, and 1 mg Dilaudid IVP x3 doses. At time of discharge pt reported pain to be tolerable and improved.      Intravenous Access:  Implanted Port.    Treatment Conditions:  Not Applicable.      Post Infusion Assessment:  Patient tolerated infusion without incident.  Blood return noted pre and post infusion.  Site patent and intact, free from redness, edema or discomfort.  No evidence of extravasations.  Access discontinued per protocol.       Discharge Plan:   Patient discharged in stable condition accompanied by: self.  Departure Mode: Ambulatory.      Jaida Kelly RN

## 2024-09-03 NOTE — TELEPHONE ENCOUNTER
Narcotic Refill Request    Medication(s) requested:  Oxycodone  Person Requesting Refill: Patient  What pain is the medication treating: sickle cell pain  How is the medication being taken?:1 tablet every 6 hrs  Does pt have enough for today? No  Is pain being adequately controlled on the current regimen?: Yes  Experiencing any side effects from medication?: No    Date of most recent appointment:  08/15/24 aida/Patricia Mantilla  Any No Show Visits: No  Next appointment:   10/11/24 aida/Patircia Mantilla  Last fill date and by whom:  08/21/24 by    Reviewed: no access    Routed/Paged provider: Patricia Mantilla

## 2024-09-03 NOTE — TELEPHONE ENCOUNTER
Available appt at 1030 with BMT. Call to pt to verify they are able to make appt. Pt confirming she is able to make appt and will arrange own transportation. Message sent to [facility] to have pt added on to schedule.

## 2024-09-05 ENCOUNTER — NURSE TRIAGE (OUTPATIENT)
Dept: ONCOLOGY | Facility: CLINIC | Age: 24
End: 2024-09-05
Payer: COMMERCIAL

## 2024-09-05 ENCOUNTER — INFUSION THERAPY VISIT (OUTPATIENT)
Dept: INFUSION THERAPY | Facility: CLINIC | Age: 24
End: 2024-09-05
Attending: PEDIATRICS
Payer: COMMERCIAL

## 2024-09-05 ENCOUNTER — PATIENT OUTREACH (OUTPATIENT)
Dept: CARE COORDINATION | Facility: CLINIC | Age: 24
End: 2024-09-05
Payer: COMMERCIAL

## 2024-09-05 VITALS
SYSTOLIC BLOOD PRESSURE: 122 MMHG | TEMPERATURE: 98.2 F | RESPIRATION RATE: 16 BRPM | DIASTOLIC BLOOD PRESSURE: 70 MMHG | HEART RATE: 16 BPM | OXYGEN SATURATION: 92 %

## 2024-09-05 DIAGNOSIS — D57.00 SICKLE CELL PAIN CRISIS (H): Primary | ICD-10-CM

## 2024-09-05 DIAGNOSIS — G81.10 SPASTIC HEMIPLEGIA, UNSPECIFIED ETIOLOGY, UNSPECIFIED LATERALITY (H): ICD-10-CM

## 2024-09-05 PROCEDURE — 96376 TX/PRO/DX INJ SAME DRUG ADON: CPT

## 2024-09-05 PROCEDURE — 96375 TX/PRO/DX INJ NEW DRUG ADDON: CPT

## 2024-09-05 PROCEDURE — 258N000003 HC RX IP 258 OP 636: Performed by: PEDIATRICS

## 2024-09-05 PROCEDURE — 250N000013 HC RX MED GY IP 250 OP 250 PS 637: Performed by: PEDIATRICS

## 2024-09-05 PROCEDURE — 96361 HYDRATE IV INFUSION ADD-ON: CPT

## 2024-09-05 PROCEDURE — 96374 THER/PROPH/DIAG INJ IV PUSH: CPT

## 2024-09-05 PROCEDURE — 250N000011 HC RX IP 250 OP 636: Performed by: PEDIATRICS

## 2024-09-05 RX ORDER — KETOROLAC TROMETHAMINE 30 MG/ML
30 INJECTION, SOLUTION INTRAMUSCULAR; INTRAVENOUS ONCE
Status: COMPLETED | OUTPATIENT
Start: 2024-09-05 | End: 2024-09-05

## 2024-09-05 RX ORDER — HEPARIN SODIUM (PORCINE) LOCK FLUSH IV SOLN 100 UNIT/ML 100 UNIT/ML
5 SOLUTION INTRAVENOUS
Status: DISCONTINUED | OUTPATIENT
Start: 2024-09-05 | End: 2024-09-05 | Stop reason: HOSPADM

## 2024-09-05 RX ORDER — HEPARIN SODIUM (PORCINE) LOCK FLUSH IV SOLN 100 UNIT/ML 100 UNIT/ML
5 SOLUTION INTRAVENOUS
Status: CANCELLED | OUTPATIENT
Start: 2024-10-01

## 2024-09-05 RX ORDER — ONDANSETRON 2 MG/ML
8 INJECTION INTRAMUSCULAR; INTRAVENOUS EVERY 6 HOURS PRN
Status: CANCELLED
Start: 2024-10-01

## 2024-09-05 RX ORDER — ONDANSETRON 4 MG/1
8 TABLET, FILM COATED ORAL
Status: CANCELLED
Start: 2024-10-01

## 2024-09-05 RX ORDER — ONDANSETRON 2 MG/ML
8 INJECTION INTRAMUSCULAR; INTRAVENOUS EVERY 6 HOURS PRN
Status: DISCONTINUED | OUTPATIENT
Start: 2024-09-05 | End: 2024-09-05 | Stop reason: HOSPADM

## 2024-09-05 RX ORDER — DIPHENHYDRAMINE HCL 25 MG
50 CAPSULE ORAL
Status: COMPLETED | OUTPATIENT
Start: 2024-09-05 | End: 2024-09-05

## 2024-09-05 RX ORDER — DIPHENHYDRAMINE HCL 25 MG
50 CAPSULE ORAL
Status: CANCELLED
Start: 2024-10-01

## 2024-09-05 RX ORDER — HEPARIN SODIUM,PORCINE 10 UNIT/ML
5 VIAL (ML) INTRAVENOUS
Status: CANCELLED | OUTPATIENT
Start: 2024-10-01

## 2024-09-05 RX ORDER — KETOROLAC TROMETHAMINE 30 MG/ML
30 INJECTION, SOLUTION INTRAMUSCULAR; INTRAVENOUS ONCE
Status: CANCELLED
Start: 2024-10-01 | End: 2024-10-01

## 2024-09-05 RX ADMIN — SODIUM CHLORIDE, POTASSIUM CHLORIDE, SODIUM LACTATE AND CALCIUM CHLORIDE 1000 ML: 600; 310; 30; 20 INJECTION, SOLUTION INTRAVENOUS at 10:37

## 2024-09-05 RX ADMIN — HYDROMORPHONE HYDROCHLORIDE 1 MG: 1 INJECTION, SOLUTION INTRAMUSCULAR; INTRAVENOUS; SUBCUTANEOUS at 10:44

## 2024-09-05 RX ADMIN — DIPHENHYDRAMINE HYDROCHLORIDE 50 MG: 25 CAPSULE ORAL at 10:37

## 2024-09-05 RX ADMIN — KETOROLAC TROMETHAMINE 30 MG: 30 INJECTION, SOLUTION INTRAMUSCULAR; INTRAVENOUS at 10:46

## 2024-09-05 RX ADMIN — HYDROMORPHONE HYDROCHLORIDE 1 MG: 1 INJECTION, SOLUTION INTRAMUSCULAR; INTRAVENOUS; SUBCUTANEOUS at 12:53

## 2024-09-05 RX ADMIN — HYDROMORPHONE HYDROCHLORIDE 1 MG: 1 INJECTION, SOLUTION INTRAMUSCULAR; INTRAVENOUS; SUBCUTANEOUS at 11:36

## 2024-09-05 RX ADMIN — Medication 5 ML: at 13:35

## 2024-09-05 RX ADMIN — ONDANSETRON 8 MG: 2 INJECTION INTRAMUSCULAR; INTRAVENOUS at 10:39

## 2024-09-05 NOTE — TELEPHONE ENCOUNTER
Oncology Nurse Triage - Sickle Cell Pain Crisis:    Situation: Jennifer  calling about Sickle Cell Pain Crisis, requesting to be added on for IV fluids and pain medicine    Background:   Patient's last infusion was 9/3/24  Last clinic visit date:8/15/24  Does patient have active treatment plan?  Yes    Assessment of Symptoms:  Onset/Duration of symptoms: 2 day    Is it typical sickle cell pain? Yes   Location: lower back and left thigh  Character: Dull ache           Intensity: 7/10    Any radiation of pain, numbness, tingling, weakness, warmth, swelling, discoloration of arms or legs?No     Fever?No    Chest Pain Present: No     Shortness of breath: No     Other home therapies tried: HEAT/HEATING PAD and WARM BATH     Last home medication taken and when: 0600 Oxycodone and IBU    Any Refills Needed?: No     Does patient have transportation & length of time to get to clinic: No -unless it is last minute, she should need transportation.     Recommendations:   0709 secure chat to Patricia Mantilla for provider approval. - okayed by Patricia to come in.  Added to infusion wait list.   0913 call to pt to offer 10am infusion appt in [facility], pt accepting of time and states she can get a ride into infusion but will need transportation home.  0915 sent to [facility] and  for transportation assistance.    If you do not hear from the infusion center by 2pm then you will not be able to get in for an infusion today. If symptoms worsen while waiting for call back, and/or you experience fever, chills, SOB, chest pain, cough, n/v, dizziness, numbness, swelling, discoloration of extremities, then seek emergency evaluation in Emergency Department.     Please note, if you are late for your appt, you risk losing your infusion appt as it may delay another patient's infusion who arrived on time.

## 2024-09-05 NOTE — PROGRESS NOTES
Infusion Nursing Note:  Jennifer Cervantes presents today for Patient presents with:  Infusion: Sickle cell pain, IV fluids, pain meds and antiemetics      Patient seen by provider today: No   present during visit today: No    Note: During [facility]'s [facility] appointment, orders from Dr. Duncan were completed.  Patient identification verified by name and date of birth.  Assessment completed.  Vitals recorded in Doc Flowsheets.  Patient was provided with education regarding medication/procedure and possible side effects.  Patient verbalized understanding. Patient reports 7/10 pain in her lower back and left leg.    Patient given  oral Benadryl 50 mg, 8 mg IV Zofran, 1 mg of IV Dilaudid x 3 doses, 30 mg IV Toradol and 1L of lactated ringers.         Administrations This Visit       diphenhydrAMINE (BENADRYL) capsule 50 mg       Admin Date  09/05/2024 Action  $Given Dose  50 mg Route  Oral Documented By  Elena Kelly RN              HYDROmorphone (DILAUDID) injection 1 mg       Admin Date  09/05/2024 Action  $Given Dose  1 mg Route  Intravenous Documented By  Elena Kelly RN               Admin Date  09/05/2024 Action  $Given Dose  1 mg Route  Intravenous Documented By  Elena Kelly RN               Admin Date  09/05/2024 Action  $Given Dose  1 mg Route  Intravenous Documented By  Elena Kelly RN              ketorolac (TORADOL) injection 30 mg       Admin Date  09/05/2024 Action  $Given Dose  30 mg Route  Intravenous Documented By  Elena Kelly RN              lactated ringers BOLUS 1,000 mL       Admin Date  09/05/2024 Action  $New Bag Dose  1,000 mL Rate  500 mL/hr Route  Intravenous Documented By  Elena Kelly RN              ondansetron (ZOFRAN) injection 8 mg       Admin Date  09/05/2024 Action  $Given Dose  8 mg Route  Intravenous Documented By  Elena Kelly RN                    Intravenous Access:  Port accessed without difficulty. Using Gauze and  tape for dressing    Treatment Conditions:  Not Applicable.  Post Infusion Assessment:  Patient tolerated infusion without incident.  Site patent and intact, free from redness, edema or discomfort.  No evidence of extravasations.  Access discontinued per protocol.       Discharge Plan:   Discharge instructions reviewed with: Patient.  AVS to patient via MYCHART.  Patient will return PRN for next appointment.   Patient discharged in stable condition accompanied by: self.  Departure Mode: Ambulatory    /70 (BP Location: Left arm, Patient Position: Semi-Short's, Cuff Size: Adult Regular)   Pulse 98   Temp 98.2  F (36.8  C) (Oral)   Resp 16   LMP  (LMP Unknown)   SpO2 94%   Elena Kelly RN on 9/5/2024 at 11:23 AM  .

## 2024-09-05 NOTE — PROGRESS NOTES
Social Work - Transportation  [facility]    Data/Intervention:  Patient Name: Jennifer Cervantes Goes By: Jennifer    /Age: 2000 (24 year old)    Referral From: Ange Triage RN's  Reason for Referral:  support requested for patient transportation needs for today's appointment.  Assessment:  called [employer] to arrange ride a will call ride home. [employer] arranged ride with [employer]. Patient will need to call [phone] when ready for return ride home.  Plan: SW called and left VM for patient regarding transportation plan.  available to assist with any other needs.    CARLOS Chavez,[facility]  Hematology/Oncology Social Worker  Phone:[phone] Pager: [phone]

## 2024-09-09 ENCOUNTER — NURSE TRIAGE (OUTPATIENT)
Dept: ONCOLOGY | Facility: CLINIC | Age: 24
End: 2024-09-09
Payer: COMMERCIAL

## 2024-09-09 ENCOUNTER — INFUSION THERAPY VISIT (OUTPATIENT)
Dept: TRANSPLANT | Facility: CLINIC | Age: 24
End: 2024-09-09
Attending: PEDIATRICS
Payer: COMMERCIAL

## 2024-09-09 VITALS
HEART RATE: 98 BPM | RESPIRATION RATE: 18 BRPM | DIASTOLIC BLOOD PRESSURE: 61 MMHG | TEMPERATURE: 97.7 F | SYSTOLIC BLOOD PRESSURE: 122 MMHG | OXYGEN SATURATION: 98 %

## 2024-09-09 DIAGNOSIS — G81.10 SPASTIC HEMIPLEGIA, UNSPECIFIED ETIOLOGY, UNSPECIFIED LATERALITY (H): ICD-10-CM

## 2024-09-09 DIAGNOSIS — D57.00 SICKLE CELL PAIN CRISIS (H): Primary | ICD-10-CM

## 2024-09-09 DIAGNOSIS — D57.00 SICKLE CELL PAIN CRISIS (H): ICD-10-CM

## 2024-09-09 PROCEDURE — 250N000011 HC RX IP 250 OP 636: Performed by: PEDIATRICS

## 2024-09-09 PROCEDURE — 96376 TX/PRO/DX INJ SAME DRUG ADON: CPT

## 2024-09-09 PROCEDURE — 258N000003 HC RX IP 258 OP 636: Performed by: PEDIATRICS

## 2024-09-09 PROCEDURE — 96361 HYDRATE IV INFUSION ADD-ON: CPT

## 2024-09-09 PROCEDURE — 96374 THER/PROPH/DIAG INJ IV PUSH: CPT

## 2024-09-09 PROCEDURE — 96375 TX/PRO/DX INJ NEW DRUG ADDON: CPT

## 2024-09-09 PROCEDURE — 250N000013 HC RX MED GY IP 250 OP 250 PS 637: Performed by: PEDIATRICS

## 2024-09-09 RX ORDER — HEPARIN SODIUM (PORCINE) LOCK FLUSH IV SOLN 100 UNIT/ML 100 UNIT/ML
5 SOLUTION INTRAVENOUS
Status: CANCELLED | OUTPATIENT
Start: 2024-10-01

## 2024-09-09 RX ORDER — ONDANSETRON 4 MG/1
8 TABLET, FILM COATED ORAL
Status: CANCELLED
Start: 2024-10-01

## 2024-09-09 RX ORDER — ONDANSETRON 2 MG/ML
8 INJECTION INTRAMUSCULAR; INTRAVENOUS EVERY 6 HOURS PRN
Status: CANCELLED
Start: 2024-10-01

## 2024-09-09 RX ORDER — HEPARIN SODIUM,PORCINE 10 UNIT/ML
5 VIAL (ML) INTRAVENOUS
Status: CANCELLED | OUTPATIENT
Start: 2024-10-01

## 2024-09-09 RX ORDER — KETOROLAC TROMETHAMINE 30 MG/ML
30 INJECTION, SOLUTION INTRAMUSCULAR; INTRAVENOUS ONCE
Status: CANCELLED
Start: 2024-10-01 | End: 2024-10-01

## 2024-09-09 RX ORDER — KETOROLAC TROMETHAMINE 30 MG/ML
30 INJECTION, SOLUTION INTRAMUSCULAR; INTRAVENOUS ONCE
Status: COMPLETED | OUTPATIENT
Start: 2024-09-09 | End: 2024-09-09

## 2024-09-09 RX ORDER — DIPHENHYDRAMINE HCL 25 MG
50 CAPSULE ORAL
Status: COMPLETED | OUTPATIENT
Start: 2024-09-09 | End: 2024-09-09

## 2024-09-09 RX ORDER — DIPHENHYDRAMINE HCL 25 MG
50 CAPSULE ORAL
Status: CANCELLED
Start: 2024-10-01

## 2024-09-09 RX ORDER — OXYCODONE HYDROCHLORIDE 10 MG/1
10 TABLET ORAL EVERY 4 HOURS PRN
Qty: 15 TABLET | Refills: 0 | Status: SHIPPED | OUTPATIENT
Start: 2024-09-09 | End: 2024-09-13

## 2024-09-09 RX ADMIN — KETOROLAC TROMETHAMINE 30 MG: 30 INJECTION, SOLUTION INTRAMUSCULAR; INTRAVENOUS at 09:51

## 2024-09-09 RX ADMIN — HYDROMORPHONE HYDROCHLORIDE 1 MG: 1 INJECTION, SOLUTION INTRAMUSCULAR; INTRAVENOUS; SUBCUTANEOUS at 11:57

## 2024-09-09 RX ADMIN — HYDROMORPHONE HYDROCHLORIDE 1 MG: 1 INJECTION, SOLUTION INTRAMUSCULAR; INTRAVENOUS; SUBCUTANEOUS at 10:56

## 2024-09-09 RX ADMIN — DIPHENHYDRAMINE HYDROCHLORIDE 50 MG: 25 CAPSULE ORAL at 09:52

## 2024-09-09 RX ADMIN — SODIUM CHLORIDE, POTASSIUM CHLORIDE, SODIUM LACTATE AND CALCIUM CHLORIDE 1000 ML: 600; 310; 30; 20 INJECTION, SOLUTION INTRAVENOUS at 09:44

## 2024-09-09 RX ADMIN — HYDROMORPHONE HYDROCHLORIDE 1 MG: 1 INJECTION, SOLUTION INTRAMUSCULAR; INTRAVENOUS; SUBCUTANEOUS at 09:51

## 2024-09-09 NOTE — TELEPHONE ENCOUNTER
Narcotic Refill Request  Medication(s) requested:  Oxycodone 10mg  Person Requesting Refill: Jennifer  What pain is the medication treating:  sickle cell pain  How is the medication being taken?: as needed  Does pt have enough for today?  No, has 1 tab left  Is pain being adequately controlled on the current regimen?: yes  Experiencing any side effects from medication?: no    Date of most recent appointment:  8/15/24  Any No Show Visits: no  Next appointment:   10/11/24  Last fill date and by whom:  Patricia Mantilla on 9/3/25   Reviewed: no access    Send to provider: Patricia Mantilla

## 2024-09-09 NOTE — TELEPHONE ENCOUNTER
Oncology Nurse Triage - Sickle Cell Pain Crisis:  Situation: Jennifer  calling about Sickle Cell Pain Crisis, requesting to be added on for IV fluids and pain medicine    Background:   Patient's last infusion was 9/5/24  Last clinic visit date:8/15/24 w/ Patricia Mantilla  Does patient have active treatment plan?  Yes    Assessment of Symptoms:  Onset/Duration of symptoms: 3 day    Is it typical sickle cell pain? Yes   Location: L arm, lower back   Character: Dull ache           Intensity: 8/10    Any radiation of pain, numbness, tingling, weakness, warmth, swelling, discoloration of arms or legs?No     Fever? No    Chest Pain Present: No     Shortness of breath: No     Other home therapies tried:      Last home medication taken and when: 0600 Oxycodone and IBU    Any Refills Needed?: Yes -oxycodone    Does patient have transportation & length of time to get to clinic: Yes     Recommendations:   Added to infusion wait list.   0817 call to pt to offer 0930 infusion appt in B[facility] infusion, okayed by charge Allison. Pt accepting of appt.   0819 message sent to scheduling.     If you do not hear from the infusion center by 2pm then you will not be able to get in for an infusion today. If symptoms worsen while waiting for call back, and/or you experience fever, chills, SOB, chest pain, cough, n/v, dizziness, numbness, swelling, discoloration of extremities, then seek emergency evaluation in Emergency Department.     Please note, if you are late for your appt, you risk losing your infusion appt as it may delay another patient's infusion who arrived on time.

## 2024-09-09 NOTE — PROGRESS NOTES
Infusion Nursing Note:  Jennifer Cervantes presents today for add on infusion.    Patient seen by provider today: No   present during visit today: Not Applicable.    Note: Patient arrived for add on infusion for IVF/pain medication. Patient reports pain started over the weekend and is located in her low back and left arm. Rated 8/10. Took home pain medication at 0600. Denies chest pain, SOB, N/V, fever. ONC toxicity assessment completed. Home medications and allergies reviewed. Received 1L LR per order, benadryl 50 mg PO, Toradol 30 mg IVP, and dilaudid 1 mg IV every 1 hour x 3 doses. Patient reports pain improvement following final dilaudid dose.       Intravenous Access:  Implanted Port.    Treatment Conditions:  Not Applicable.      Post Infusion Assessment:  Patient tolerated infusion without incident.  Blood return noted pre and post infusion.  Site patent and intact, free from redness, edema or discomfort.  No evidence of extravasations.  Access discontinued per protocol.       Discharge Plan:   Patient discharged in stable condition accompanied by: self.  Departure Mode: Ambulatory.      Jennifer Lai RN  a

## 2024-09-10 ENCOUNTER — HOSPITAL ENCOUNTER (EMERGENCY)
Facility: CLINIC | Age: 24
Discharge: HOME | End: 2024-09-11
Attending: EMERGENCY MEDICINE | Admitting: EMERGENCY MEDICINE
Payer: COMMERCIAL

## 2024-09-10 DIAGNOSIS — D57.00 SICKLE CELL PAIN CRISIS (H): ICD-10-CM

## 2024-09-10 PROCEDURE — 96361 HYDRATE IV INFUSION ADD-ON: CPT | Performed by: EMERGENCY MEDICINE

## 2024-09-10 PROCEDURE — 96374 THER/PROPH/DIAG INJ IV PUSH: CPT | Performed by: EMERGENCY MEDICINE

## 2024-09-10 PROCEDURE — 84702 CHORIONIC GONADOTROPIN TEST: CPT | Performed by: EMERGENCY MEDICINE

## 2024-09-10 PROCEDURE — 250N000011 HC RX IP 250 OP 636: Performed by: EMERGENCY MEDICINE

## 2024-09-10 PROCEDURE — 96375 TX/PRO/DX INJ NEW DRUG ADDON: CPT | Performed by: EMERGENCY MEDICINE

## 2024-09-10 PROCEDURE — 85045 AUTOMATED RETICULOCYTE COUNT: CPT | Performed by: EMERGENCY MEDICINE

## 2024-09-10 PROCEDURE — 36415 COLL VENOUS BLD VENIPUNCTURE: CPT | Performed by: EMERGENCY MEDICINE

## 2024-09-10 PROCEDURE — 258N000003 HC RX IP 258 OP 636: Performed by: EMERGENCY MEDICINE

## 2024-09-10 PROCEDURE — 99284 EMERGENCY DEPT VISIT MOD MDM: CPT | Mod: 25 | Performed by: EMERGENCY MEDICINE

## 2024-09-10 PROCEDURE — 99284 EMERGENCY DEPT VISIT MOD MDM: CPT | Performed by: EMERGENCY MEDICINE

## 2024-09-10 PROCEDURE — 80053 COMPREHEN METABOLIC PANEL: CPT | Performed by: EMERGENCY MEDICINE

## 2024-09-10 PROCEDURE — 85025 COMPLETE CBC W/AUTO DIFF WBC: CPT | Performed by: EMERGENCY MEDICINE

## 2024-09-10 RX ORDER — KETOROLAC TROMETHAMINE 30 MG/ML
30 INJECTION, SOLUTION INTRAMUSCULAR; INTRAVENOUS ONCE
Status: COMPLETED | OUTPATIENT
Start: 2024-09-10 | End: 2024-09-10

## 2024-09-10 RX ADMIN — SODIUM CHLORIDE, POTASSIUM CHLORIDE, SODIUM LACTATE AND CALCIUM CHLORIDE 1000 ML: 600; 310; 30; 20 INJECTION, SOLUTION INTRAVENOUS at 23:51

## 2024-09-10 RX ADMIN — HYDROMORPHONE HYDROCHLORIDE 1 MG: 1 INJECTION, SOLUTION INTRAMUSCULAR; INTRAVENOUS; SUBCUTANEOUS at 23:52

## 2024-09-10 RX ADMIN — KETOROLAC TROMETHAMINE 30 MG: 30 INJECTION, SOLUTION INTRAMUSCULAR at 23:54

## 2024-09-10 ASSESSMENT — ACTIVITIES OF DAILY LIVING (ADL): ADLS_ACUITY_SCORE: 37

## 2024-09-11 VITALS
HEART RATE: 92 BPM | DIASTOLIC BLOOD PRESSURE: 59 MMHG | WEIGHT: 154.4 LBS | SYSTOLIC BLOOD PRESSURE: 102 MMHG | BODY MASS INDEX: 26.36 KG/M2 | TEMPERATURE: 98.2 F | HEIGHT: 64 IN | RESPIRATION RATE: 16 BRPM | OXYGEN SATURATION: 91 %

## 2024-09-11 LAB
ALBUMIN SERPL BCG-MCNC: 4.5 G/DL (ref 3.5–5.2)
ALBUMIN UR-MCNC: NEGATIVE MG/DL
ALP SERPL-CCNC: 60 U/L (ref 40–150)
ALT SERPL W P-5'-P-CCNC: 23 U/L (ref 0–50)
ANION GAP SERPL CALCULATED.3IONS-SCNC: 10 MMOL/L (ref 7–15)
APPEARANCE UR: ABNORMAL
AST SERPL W P-5'-P-CCNC: 46 U/L (ref 0–45)
BACTERIA #/AREA URNS HPF: ABNORMAL /HPF
BASOPHILS # BLD AUTO: 0.2 10E3/UL (ref 0–0.2)
BASOPHILS NFR BLD AUTO: 1 %
BILIRUB SERPL-MCNC: 3 MG/DL
BILIRUB UR QL STRIP: NEGATIVE
BUN SERPL-MCNC: 6.3 MG/DL (ref 6–20)
CALCIUM SERPL-MCNC: 8.8 MG/DL (ref 8.8–10.4)
CHLORIDE SERPL-SCNC: 104 MMOL/L (ref 98–107)
COLOR UR AUTO: YELLOW
CREAT SERPL-MCNC: 0.63 MG/DL (ref 0.51–0.95)
EGFRCR SERPLBLD CKD-EPI 2021: >90 ML/MIN/1.73M2
EOSINOPHIL # BLD AUTO: 0.4 10E3/UL (ref 0–0.7)
EOSINOPHIL NFR BLD AUTO: 3 %
ERYTHROCYTE [DISTWIDTH] IN BLOOD BY AUTOMATED COUNT: 23 % (ref 10–15)
GLUCOSE SERPL-MCNC: 91 MG/DL (ref 70–99)
GLUCOSE UR STRIP-MCNC: NEGATIVE MG/DL
HCG INTACT+B SERPL-ACNC: <1 MIU/ML
HCO3 SERPL-SCNC: 22 MMOL/L (ref 22–29)
HCT VFR BLD AUTO: 20.4 % (ref 35–47)
HGB BLD-MCNC: 7.3 G/DL (ref 11.7–15.7)
HGB UR QL STRIP: NEGATIVE
IMM GRANULOCYTES # BLD: 0 10E3/UL
IMM GRANULOCYTES NFR BLD: 1 %
KETONES UR STRIP-MCNC: NEGATIVE MG/DL
LEUKOCYTE ESTERASE UR QL STRIP: ABNORMAL
LYMPHOCYTES # BLD AUTO: 2.8 10E3/UL (ref 0.8–5.3)
LYMPHOCYTES NFR BLD AUTO: 19 %
MCH RBC QN AUTO: 30.5 PG (ref 26.5–33)
MCHC RBC AUTO-ENTMCNC: 35.8 G/DL (ref 31.5–36.5)
MCV RBC AUTO: 85 FL (ref 78–100)
MONOCYTES # BLD AUTO: 1.2 10E3/UL (ref 0–1.3)
MONOCYTES NFR BLD AUTO: 8 %
MUCOUS THREADS #/AREA URNS LPF: PRESENT /LPF
NEUTROPHILS # BLD AUTO: 10.2 10E3/UL (ref 1.6–8.3)
NEUTROPHILS NFR BLD AUTO: 68 %
NITRATE UR QL: NEGATIVE
NRBC # BLD AUTO: 0.2 10E3/UL
NRBC BLD AUTO-RTO: 2 /100
PH UR STRIP: 6 [PH] (ref 5–7)
PLATELET # BLD AUTO: 502 10E3/UL (ref 150–450)
POTASSIUM SERPL-SCNC: 3.9 MMOL/L (ref 3.4–5.3)
PROT SERPL-MCNC: 7.8 G/DL (ref 6.4–8.3)
RBC # BLD AUTO: 2.39 10E6/UL (ref 3.8–5.2)
RBC URINE: 1 /HPF
RENAL TUB EPI: <1 /HPF
RETICS # AUTO: 0.49 10E6/UL (ref 0.03–0.1)
RETICS/RBC NFR AUTO: 20.6 % (ref 0.5–2)
SODIUM SERPL-SCNC: 136 MMOL/L (ref 135–145)
SP GR UR STRIP: 1.01 (ref 1–1.03)
SQUAMOUS EPITHELIAL: 5 /HPF
TRANSITIONAL EPI: <1 /HPF
UROBILINOGEN UR STRIP-MCNC: NORMAL MG/DL
WBC # BLD AUTO: 14.9 10E3/UL (ref 4–11)
WBC URINE: 14 /HPF

## 2024-09-11 PROCEDURE — 250N000011 HC RX IP 250 OP 636: Performed by: EMERGENCY MEDICINE

## 2024-09-11 PROCEDURE — 250N000013 HC RX MED GY IP 250 OP 250 PS 637: Performed by: EMERGENCY MEDICINE

## 2024-09-11 PROCEDURE — 96376 TX/PRO/DX INJ SAME DRUG ADON: CPT | Performed by: EMERGENCY MEDICINE

## 2024-09-11 PROCEDURE — 81001 URINALYSIS AUTO W/SCOPE: CPT | Performed by: EMERGENCY MEDICINE

## 2024-09-11 PROCEDURE — 87086 URINE CULTURE/COLONY COUNT: CPT | Performed by: EMERGENCY MEDICINE

## 2024-09-11 PROCEDURE — 96375 TX/PRO/DX INJ NEW DRUG ADDON: CPT | Performed by: EMERGENCY MEDICINE

## 2024-09-11 RX ORDER — HEPARIN SODIUM (PORCINE) LOCK FLUSH IV SOLN 100 UNIT/ML 100 UNIT/ML
5-10 SOLUTION INTRAVENOUS
Status: DISCONTINUED | OUTPATIENT
Start: 2024-09-11 | End: 2024-09-11 | Stop reason: HOSPADM

## 2024-09-11 RX ORDER — ONDANSETRON 2 MG/ML
4 INJECTION INTRAMUSCULAR; INTRAVENOUS ONCE
Status: COMPLETED | OUTPATIENT
Start: 2024-09-11 | End: 2024-09-11

## 2024-09-11 RX ORDER — OXYCODONE HYDROCHLORIDE 5 MG/1
10 TABLET ORAL ONCE
Status: COMPLETED | OUTPATIENT
Start: 2024-09-11 | End: 2024-09-11

## 2024-09-11 RX ADMIN — ONDANSETRON 4 MG: 2 INJECTION INTRAMUSCULAR; INTRAVENOUS at 02:54

## 2024-09-11 RX ADMIN — HEPARIN 5 ML: 100 SYRINGE at 05:47

## 2024-09-11 RX ADMIN — HYDROMORPHONE HYDROCHLORIDE 1 MG: 1 INJECTION, SOLUTION INTRAMUSCULAR; INTRAVENOUS; SUBCUTANEOUS at 00:54

## 2024-09-11 RX ADMIN — HYDROMORPHONE HYDROCHLORIDE 1 MG: 1 INJECTION, SOLUTION INTRAMUSCULAR; INTRAVENOUS; SUBCUTANEOUS at 02:11

## 2024-09-11 RX ADMIN — OXYCODONE HYDROCHLORIDE 10 MG: 5 TABLET ORAL at 04:07

## 2024-09-11 ASSESSMENT — ACTIVITIES OF DAILY LIVING (ADL)
ADLS_ACUITY_SCORE: 37

## 2024-09-11 NOTE — DISCHARGE INSTRUCTIONS
Continue current medications.  Follow-up with your hematologist.    Return if fever, cough, chest pain, vomiting, or other concerns.

## 2024-09-11 NOTE — ED TRIAGE NOTES
Pt reports having infusion yesterday and woke up with back patient report tried to manage it with tylenol with no success.

## 2024-09-11 NOTE — ED PROVIDER NOTES
Patient monitored in the ED for several hours.  Feels better after receiving pain medications per her sickle cell protocol.  Discharged in stable condition.     Raul Diaz,   09/11/24 8871

## 2024-09-11 NOTE — ED PROVIDER NOTES
[facility] EMERGENCY DEPARTMENT ([facility])    9/10/24      ED PROVIDER NOTE    History     Chief Complaint   Patient presents with    Sickle Cell Pain Crisis     HPI  Jennifer Cervantes is a 24 year old female with history of sickle cell anemia, CVA with spastic hemiplegia, cognitive delay, asthma, prior cerebral infarction, who presents to the ED with complaints of sickle cell pain.  Patient states that she has had pain in her left low back for the past day.  She denies any radiation.  She denies any bowel or bladder dysfunction.  No weakness aside from her right hemiplegia from her stroke.  She denies any abdominal pain.  No fever.  No nausea or vomiting.  She denies any dysuria, urgency, or frequency.  She has taken her home medications without improvement.  She also went to the infusion center yesterday for treatment.    Past Medical History  Past Medical History:   Diagnosis Date    Anxiety     Bleeding disorder (H24)     Blood clotting disorder (H24)     Cerebral infarction (H) 2015    Cognitive developmental delay     low IQ. 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 side (H)     right hand contractures    Iron overload due to repeated red blood cell transfusions     Migraines     Multiple subsegmental pulmonary emboli without acute cor pulmonale (H) 02/01/2021    Oppositional defiant behavior     Presence of intrauterine contraceptive device 2/18/2020    Superficial venous thrombosis of arm, right 03/25/2021    Uncomplicated asthma      Past Surgical History:   Procedure Laterality Date    AS INSERT TUNNELED CV 2 CATH W/O PORT/PUMP      CHOLECYSTECTOMY      CV RIGHT HEART CATH MEASUREMENTS RECORDED N/A 11/18/2021    Procedure: Right Heart Cath;  Surgeon: Jackson Stauffer MD;  Location:  HEART CARDIAC CATH LAB    INSERT PORT VASCULAR ACCESS Left 4/21/2021    Procedure:  INSERTION, VASCULAR ACCESS PORT (NOT SURE ON SIDE UNTIL REMOVAL);  Surgeon: Rajan More MD;  Location: [facility] OR    IR CHEST PORT PLACEMENT > 5 YRS OF AGE  4/21/2021    IR CVC NON TUNNEL LINE REMOVAL  5/6/2021    IR CVC NON TUNNEL PLACEMENT > 5 YRS  04/07/2020    IR CVC NON TUNNEL PLACEMENT > 5 YRS  4/30/2021    IR CVC NON TUNNEL PLACEMENT > 5 YRS  9/7/2022    IR PORT REMOVAL LEFT  4/21/2021    REMOVE PORT VASCULAR ACCESS Left 4/21/2021    Procedure: REMOVAL, VASCULAR ACCESS PORT LEFT;  Surgeon: Rajan More MD;  Location: [facility] OR    REPAIR TENDON ELBOW Right 10/02/2019    Procedure: Right Elbow Flexor Lengthening, Flexor Pronator Slide Of Wrist and Finger, Thumb Adductor Lengthening;  Surgeon: Anai Franco MD;  Location: [facility] OR    TONSILLECTOMY Bilateral 10/02/2019    Procedure: Bilateral Tonsillectomy;  Surgeon: Farhana Guy MD;  Location: [facility] OR    ZZC BREAST REDUCTION (INCLUDES LIPO) TIER 3 Bilateral 04/23/2019     acetaminophen (TYLENOL) 325 MG tablet  albuterol (PROAIR HFA/PROVENTIL HFA/VENTOLIN HFA) 108 (90 Base) MCG/ACT inhaler  albuterol (PROVENTIL) (2.5 MG/3ML) 0.083% neb solution  aspirin (ASA) 81 MG chewable tablet  budesonide-formoterol (SYMBICORT) 160-4.5 MCG/ACT Inhaler  deferasirox (JADENU) 360 MG tablet  EPINEPHrine (ANY BX GENERIC EQUIV) 0.3 MG/0.3ML injection 2-pack  gabapentin (NEURONTIN) 300 MG capsule  hydroxyurea (HYDREA) 500 MG capsule  ibuprofen (ADVIL/MOTRIN) 600 MG tablet  ibuprofen (ADVIL/MOTRIN) 800 MG tablet  melatonin 5 MG tablet  methocarbamol (ROBAXIN) 750 MG tablet  naloxone (NARCAN) 4 MG/0.1ML nasal spray  naloxone (NARCAN) 4 MG/0.1ML nasal spray  omeprazole (PRILOSEC) 20 MG DR capsule  ondansetron (ZOFRAN ODT) 4 MG ODT tab  ondansetron (ZOFRAN ODT) 8 MG ODT tab  oxyCODONE IR (ROXICODONE) 10 MG tablet      Allergies   Allergen Reactions    Contrast Dye Angioedema     Hives and breathing issues    Fish-Derived Products Hives    Seafood Hives    Adhesive Tape  "Hives     Primipore dressing causes hives    Gadolinium     Iodinated Contrast Media      Family History  Family History   Problem Relation Age of Onset    Sickle Cell Trait Mother     Hypertension Mother     Asthma Mother     Sickle Cell Trait Father     Glaucoma No family hx of     Macular Degeneration No family hx of     Diabetes No family hx of     Gout No family hx of      Social History   Social History     Tobacco Use    Smoking status: Never     Passive exposure: Never    Smokeless tobacco: Never   Substance Use Topics    Alcohol use: Not Currently     Alcohol/week: 0.0 standard drinks of alcohol    Drug use: Never      Past medical history, past surgical history, medications, allergies, family history, and social history were reviewed with the patient. No additional pertinent items.     A medically appropriate review of systems was performed with pertinent positives and negatives noted in the HPI, and all other systems negative.    Physical Exam   BP: 113/77  Pulse: 103  Temp: 98.2  F (36.8  C)  Resp: 18  Height: 162.6 cm (5' 4\")  Weight: 70 kg (154 lb 6.4 oz)  SpO2: 93 %  Physical Exam  Vitals and nursing note reviewed.   Constitutional:       General: She is not in acute distress.     Appearance: She is not diaphoretic.   HENT:      Head: Normocephalic and atraumatic.   Eyes:      Extraocular Movements: Extraocular movements intact.      Conjunctiva/sclera: Conjunctivae normal.   Cardiovascular:      Rate and Rhythm: Normal rate.      Heart sounds: Normal heart sounds.   Pulmonary:      Effort: Pulmonary effort is normal. No respiratory distress.      Breath sounds: Normal breath sounds.   Abdominal:      Palpations: Abdomen is soft.      Tenderness: There is no abdominal tenderness.   Musculoskeletal:         General: Normal range of motion.      Cervical back: Normal range of motion and neck supple.      Lumbar back: Tenderness present. Negative right straight leg raise test and negative left straight " leg raise test.        Back:    Skin:     General: Skin is warm.      Findings: No rash.   Neurological:      Mental Status: Mental status is at baseline.      Comments: Baseline right hemiplegia           ED Course, Procedures, & Data      Procedures             Results for orders placed or performed during the hospital encounter of 09/10/24   Comprehensive metabolic panel     Status: Abnormal   Result Value Ref Range    Sodium 136 135 - 145 mmol/L    Potassium 3.9 3.4 - 5.3 mmol/L    Carbon Dioxide (CO2) 22 22 - 29 mmol/L    Anion Gap 10 7 - 15 mmol/L    Urea Nitrogen 6.3 6.0 - 20.0 mg/dL    Creatinine 0.63 0.51 - 0.95 mg/dL    GFR Estimate >90 >60 mL/min/1.73m2    Calcium 8.8 8.8 - 10.4 mg/dL    Chloride 104 98 - 107 mmol/L    Glucose 91 70 - 99 mg/dL    Alkaline Phosphatase 60 40 - 150 U/L    AST 46 (H) 0 - 45 U/L    ALT 23 0 - 50 U/L    Protein Total 7.8 6.4 - 8.3 g/dL    Albumin 4.5 3.5 - 5.2 g/dL    Bilirubin Total 3.0 (H) <=1.2 mg/dL   Reticulocyte count     Status: Abnormal   Result Value Ref Range    % Reticulocyte 20.6 (H) 0.5 - 2.0 %    Absolute Reticulocyte 0.490 (H) 0.025 - 0.095 10e6/uL   HCG quantitative pregnancy     Status: Normal   Result Value Ref Range    hCG Quantitative <1 <5 mIU/mL   CBC with platelets and differential     Status: Abnormal   Result Value Ref Range    WBC Count 14.9 (H) 4.0 - 11.0 10e3/uL    RBC Count 2.39 (L) 3.80 - 5.20 10e6/uL    Hemoglobin 7.3 (L) 11.7 - 15.7 g/dL    Hematocrit 20.4 (L) 35.0 - 47.0 %    MCV 85 78 - 100 fL    MCH 30.5 26.5 - 33.0 pg    MCHC 35.8 31.5 - 36.5 g/dL    RDW 23.0 (H) 10.0 - 15.0 %    Platelet Count 502 (H) 150 - 450 10e3/uL    % Neutrophils 68 %    % Lymphocytes 19 %    % Monocytes 8 %    % Eosinophils 3 %    % Basophils 1 %    % Immature Granulocytes 1 %    NRBCs per 100 WBC 2 (H) <1 /100    Absolute Neutrophils 10.2 (H) 1.6 - 8.3 10e3/uL    Absolute Lymphocytes 2.8 0.8 - 5.3 10e3/uL    Absolute Monocytes 1.2 0.0 - 1.3 10e3/uL    Absolute  Eosinophils 0.4 0.0 - 0.7 10e3/uL    Absolute Basophils 0.2 0.0 - 0.2 10e3/uL    Absolute Immature Granulocytes 0.0 <=0.4 10e3/uL    Absolute NRBCs 0.2 10e3/uL   CBC with platelets differential     Status: Abnormal    Narrative    The following orders were created for panel order CBC with platelets differential.  Procedure                               Abnormality         Status                     ---------                               -----------         ------                     CBC with platelets and d...[[id number]]  Abnormal            Final result                 Please view results for these tests on the individual orders.         Results for orders placed or performed during the hospital encounter of 09/10/24   Comprehensive metabolic panel     Status: Abnormal   Result Value Ref Range    Sodium 136 135 - 145 mmol/L    Potassium 3.9 3.4 - 5.3 mmol/L    Carbon Dioxide (CO2) 22 22 - 29 mmol/L    Anion Gap 10 7 - 15 mmol/L    Urea Nitrogen 6.3 6.0 - 20.0 mg/dL    Creatinine 0.63 0.51 - 0.95 mg/dL    GFR Estimate >90 >60 mL/min/1.73m2    Calcium 8.8 8.8 - 10.4 mg/dL    Chloride 104 98 - 107 mmol/L    Glucose 91 70 - 99 mg/dL    Alkaline Phosphatase 60 40 - 150 U/L    AST 46 (H) 0 - 45 U/L    ALT 23 0 - 50 U/L    Protein Total 7.8 6.4 - 8.3 g/dL    Albumin 4.5 3.5 - 5.2 g/dL    Bilirubin Total 3.0 (H) <=1.2 mg/dL   Reticulocyte count     Status: Abnormal   Result Value Ref Range    % Reticulocyte 20.6 (H) 0.5 - 2.0 %    Absolute Reticulocyte 0.490 (H) 0.025 - 0.095 10e6/uL   HCG quantitative pregnancy     Status: Normal   Result Value Ref Range    hCG Quantitative <1 <5 mIU/mL   CBC with platelets and differential     Status: Abnormal   Result Value Ref Range    WBC Count 14.9 (H) 4.0 - 11.0 10e3/uL    RBC Count 2.39 (L) 3.80 - 5.20 10e6/uL    Hemoglobin 7.3 (L) 11.7 - 15.7 g/dL    Hematocrit 20.4 (L) 35.0 - 47.0 %    MCV 85 78 - 100 fL    MCH 30.5 26.5 - 33.0 pg    MCHC 35.8 31.5 - 36.5 g/dL    RDW 23.0 (H)  10.0 - 15.0 %    Platelet Count 502 (H) 150 - 450 10e3/uL    % Neutrophils 68 %    % Lymphocytes 19 %    % Monocytes 8 %    % Eosinophils 3 %    % Basophils 1 %    % Immature Granulocytes 1 %    NRBCs per 100 WBC 2 (H) <1 /100    Absolute Neutrophils 10.2 (H) 1.6 - 8.3 10e3/uL    Absolute Lymphocytes 2.8 0.8 - 5.3 10e3/uL    Absolute Monocytes 1.2 0.0 - 1.3 10e3/uL    Absolute Eosinophils 0.4 0.0 - 0.7 10e3/uL    Absolute Basophils 0.2 0.0 - 0.2 10e3/uL    Absolute Immature Granulocytes 0.0 <=0.4 10e3/uL    Absolute NRBCs 0.2 10e3/uL   CBC with platelets differential     Status: Abnormal    Narrative    The following orders were created for panel order CBC with platelets differential.  Procedure                               Abnormality         Status                     ---------                               -----------         ------                     CBC with platelets and d...[[id number]]  Abnormal            Final result                 Please view results for these tests on the individual orders.     Medications   lactated ringers BOLUS 1,000 mL (1,000 mLs Intravenous $New Bag 9/10/24 2351)   HYDROmorphone (DILAUDID) injection 1 mg (1 mg Intravenous $Given 9/10/24 2352)   ketorolac (TORADOL) injection 30 mg (30 mg Intravenous $Given 9/10/24 2354)     Labs Ordered and Resulted from Time of ED Arrival to Time of ED Departure   COMPREHENSIVE METABOLIC PANEL - Abnormal       Result Value    Sodium 136      Potassium 3.9      Carbon Dioxide (CO2) 22      Anion Gap 10      Urea Nitrogen 6.3      Creatinine 0.63      GFR Estimate >90      Calcium 8.8      Chloride 104      Glucose 91      Alkaline Phosphatase 60      AST 46 (*)     ALT 23      Protein Total 7.8      Albumin 4.5      Bilirubin Total 3.0 (*)    RETICULOCYTE COUNT - Abnormal    % Reticulocyte 20.6 (*)     Absolute Reticulocyte 0.490 (*)    CBC WITH PLATELETS AND DIFFERENTIAL - Abnormal    WBC Count 14.9 (*)     RBC Count 2.39 (*)     Hemoglobin 7.3  (*)     Hematocrit 20.4 (*)     MCV 85      MCH 30.5      MCHC 35.8      RDW 23.0 (*)     Platelet Count 502 (*)     % Neutrophils 68      % Lymphocytes 19      % Monocytes 8      % Eosinophils 3      % Basophils 1      % Immature Granulocytes 1      NRBCs per 100 WBC 2 (*)     Absolute Neutrophils 10.2 (*)     Absolute Lymphocytes 2.8      Absolute Monocytes 1.2      Absolute Eosinophils 0.4      Absolute Basophils 0.2      Absolute Immature Granulocytes 0.0      Absolute NRBCs 0.2     HCG QUANTITATIVE PREGNANCY - Normal    hCG Quantitative <1     ROUTINE UA WITH MICROSCOPIC REFLEX TO CULTURE     No orders to display        Medications   lactated ringers BOLUS 1,000 mL (1,000 mLs Intravenous $New Bag 9/10/24 4444)   HYDROmorphone (DILAUDID) injection 1 mg (1 mg Intravenous $Given 9/10/24 6438)   ketorolac (TORADOL) injection 30 mg (30 mg Intravenous $Given 9/10/24 8747)        Critical care was not performed.     Medical Decision Making  The patient's presentation was of moderate complexity (a chronic illness mild to moderate exacerbation, progression, or side effect of treatment).    The patient's evaluation involved:  review of external note(s) from 3+ sources (ED encounter from 8/31 and 8/29, infusion therapy visit from 9/9, care plan)  review of 3+ test result(s) ordered prior to this encounter (see separate area of note for details)  ordering and/or review of 3+ test(s) in this encounter (see separate area of note for details)    The patient's management necessitated high risk (a parenteral controlled substance).    Assessment & Plan    24 year old female with history of sickle cell disease to the emergency department for low back pain consistent with sickle cell pain crisis.  She does not have any fever or signs of cauda equina syndrome.  Her labs are at their baseline with leukocytosis and anemia.  No significant electrolyte disturbance.  Treated according to her pain protocol with LR, Toradol, Dilaudid.   Will complete pain management protocol and anticipate discharged home at that time.    I have reviewed the nursing notes. I have reviewed the findings, diagnosis, plan and need for follow up with the patient.    New Prescriptions    No medications on file       Final diagnoses:   Sickle cell pain crisis (H)     Chart documentation was completed with Dragon voice-recognition software. Even though reviewed, this chart may still contain some grammatical, spelling, and word errors.     [facility] EMERGENCY DEPARTMENT  9/10/2024     Francesco Green MD  09/11/24 0049

## 2024-09-12 LAB — BACTERIA UR CULT: NORMAL

## 2024-09-13 ENCOUNTER — NURSE TRIAGE (OUTPATIENT)
Dept: ONCOLOGY | Facility: CLINIC | Age: 24
End: 2024-09-13
Payer: COMMERCIAL

## 2024-09-13 ENCOUNTER — INFUSION THERAPY VISIT (OUTPATIENT)
Dept: ONCOLOGY | Facility: CLINIC | Age: 24
End: 2024-09-13
Attending: PEDIATRICS
Payer: COMMERCIAL

## 2024-09-13 ENCOUNTER — APPOINTMENT (OUTPATIENT)
Dept: LAB | Facility: CLINIC | Age: 24
End: 2024-09-13
Attending: PEDIATRICS
Payer: COMMERCIAL

## 2024-09-13 VITALS
SYSTOLIC BLOOD PRESSURE: 126 MMHG | BODY MASS INDEX: 26.76 KG/M2 | TEMPERATURE: 98 F | DIASTOLIC BLOOD PRESSURE: 76 MMHG | HEART RATE: 90 BPM | RESPIRATION RATE: 16 BRPM | WEIGHT: 155.9 LBS | OXYGEN SATURATION: 93 %

## 2024-09-13 DIAGNOSIS — D57.00 SICKLE CELL PAIN CRISIS (H): ICD-10-CM

## 2024-09-13 DIAGNOSIS — G81.10 SPASTIC HEMIPLEGIA, UNSPECIFIED ETIOLOGY, UNSPECIFIED LATERALITY (H): Primary | ICD-10-CM

## 2024-09-13 LAB
ANION GAP SERPL CALCULATED.3IONS-SCNC: 12 MMOL/L (ref 7–15)
BASOPHILS # BLD AUTO: 0.2 10E3/UL (ref 0–0.2)
BASOPHILS NFR BLD AUTO: 2 %
BUN SERPL-MCNC: 7.7 MG/DL (ref 6–20)
CALCIUM SERPL-MCNC: 9 MG/DL (ref 8.8–10.4)
CHLORIDE SERPL-SCNC: 105 MMOL/L (ref 98–107)
CREAT SERPL-MCNC: 0.49 MG/DL (ref 0.51–0.95)
EGFRCR SERPLBLD CKD-EPI 2021: >90 ML/MIN/1.73M2
EOSINOPHIL # BLD AUTO: 0.4 10E3/UL (ref 0–0.7)
EOSINOPHIL NFR BLD AUTO: 3 %
ERYTHROCYTE [DISTWIDTH] IN BLOOD BY AUTOMATED COUNT: 26.7 % (ref 10–15)
GLUCOSE SERPL-MCNC: 88 MG/DL (ref 70–99)
HCO3 SERPL-SCNC: 21 MMOL/L (ref 22–29)
HCT VFR BLD AUTO: 20.9 % (ref 35–47)
HGB BLD-MCNC: 7.3 G/DL (ref 11.7–15.7)
IMM GRANULOCYTES # BLD: 0.1 10E3/UL
IMM GRANULOCYTES NFR BLD: 1 %
LYMPHOCYTES # BLD AUTO: 2.2 10E3/UL (ref 0.8–5.3)
LYMPHOCYTES NFR BLD AUTO: 17 %
MCH RBC QN AUTO: 31.1 PG (ref 26.5–33)
MCHC RBC AUTO-ENTMCNC: 34.9 G/DL (ref 31.5–36.5)
MCV RBC AUTO: 89 FL (ref 78–100)
MONOCYTES # BLD AUTO: 1 10E3/UL (ref 0–1.3)
MONOCYTES NFR BLD AUTO: 7 %
NEUTROPHILS # BLD AUTO: 9 10E3/UL (ref 1.6–8.3)
NEUTROPHILS NFR BLD AUTO: 70 %
NRBC # BLD AUTO: 0.6 10E3/UL
NRBC BLD AUTO-RTO: 5 /100
PLATELET # BLD AUTO: 485 10E3/UL (ref 150–450)
POTASSIUM SERPL-SCNC: 3.8 MMOL/L (ref 3.4–5.3)
RBC # BLD AUTO: 2.35 10E6/UL (ref 3.8–5.2)
RETICS # AUTO: 0.67 10E6/UL (ref 0.03–0.1)
RETICS/RBC NFR AUTO: 27.4 % (ref 0.5–2)
SODIUM SERPL-SCNC: 138 MMOL/L (ref 135–145)
WBC # BLD AUTO: 12.9 10E3/UL (ref 4–11)

## 2024-09-13 PROCEDURE — 250N000013 HC RX MED GY IP 250 OP 250 PS 637: Performed by: PEDIATRICS

## 2024-09-13 PROCEDURE — 96365 THER/PROPH/DIAG IV INF INIT: CPT

## 2024-09-13 PROCEDURE — 85045 AUTOMATED RETICULOCYTE COUNT: CPT

## 2024-09-13 PROCEDURE — 96376 TX/PRO/DX INJ SAME DRUG ADON: CPT

## 2024-09-13 PROCEDURE — 96375 TX/PRO/DX INJ NEW DRUG ADDON: CPT

## 2024-09-13 PROCEDURE — 82565 ASSAY OF CREATININE: CPT

## 2024-09-13 PROCEDURE — 96413 CHEMO IV INFUSION 1 HR: CPT

## 2024-09-13 PROCEDURE — 36591 DRAW BLOOD OFF VENOUS DEVICE: CPT

## 2024-09-13 PROCEDURE — 85025 COMPLETE CBC W/AUTO DIFF WBC: CPT

## 2024-09-13 PROCEDURE — 96361 HYDRATE IV INFUSION ADD-ON: CPT

## 2024-09-13 PROCEDURE — 82374 ASSAY BLOOD CARBON DIOXIDE: CPT

## 2024-09-13 PROCEDURE — 250N000011 HC RX IP 250 OP 636: Performed by: PEDIATRICS

## 2024-09-13 PROCEDURE — 258N000003 HC RX IP 258 OP 636: Performed by: PEDIATRICS

## 2024-09-13 RX ORDER — DIPHENHYDRAMINE HYDROCHLORIDE 50 MG/ML
50 INJECTION INTRAMUSCULAR; INTRAVENOUS
Start: 2024-10-09

## 2024-09-13 RX ORDER — KETOROLAC TROMETHAMINE 30 MG/ML
30 INJECTION, SOLUTION INTRAMUSCULAR; INTRAVENOUS ONCE
Status: CANCELLED
Start: 2024-10-01 | End: 2024-10-01

## 2024-09-13 RX ORDER — HEPARIN SODIUM,PORCINE 10 UNIT/ML
5 VIAL (ML) INTRAVENOUS
OUTPATIENT
Start: 2024-10-09

## 2024-09-13 RX ORDER — METHYLPREDNISOLONE SODIUM SUCCINATE 125 MG/2ML
125 INJECTION, POWDER, LYOPHILIZED, FOR SOLUTION INTRAMUSCULAR; INTRAVENOUS
Start: 2024-10-09

## 2024-09-13 RX ORDER — ALBUTEROL SULFATE 0.83 MG/ML
2.5 SOLUTION RESPIRATORY (INHALATION)
OUTPATIENT
Start: 2024-10-09

## 2024-09-13 RX ORDER — HEPARIN SODIUM (PORCINE) LOCK FLUSH IV SOLN 100 UNIT/ML 100 UNIT/ML
5 SOLUTION INTRAVENOUS
OUTPATIENT
Start: 2024-10-09

## 2024-09-13 RX ORDER — ONDANSETRON 4 MG/1
8 TABLET, FILM COATED ORAL
Status: CANCELLED
Start: 2024-10-01

## 2024-09-13 RX ORDER — HEPARIN SODIUM (PORCINE) LOCK FLUSH IV SOLN 100 UNIT/ML 100 UNIT/ML
500 SOLUTION INTRAVENOUS ONCE
Status: COMPLETED | OUTPATIENT
Start: 2024-09-13 | End: 2024-09-13

## 2024-09-13 RX ORDER — ONDANSETRON 2 MG/ML
8 INJECTION INTRAMUSCULAR; INTRAVENOUS EVERY 6 HOURS PRN
Status: DISCONTINUED | OUTPATIENT
Start: 2024-09-13 | End: 2024-09-13 | Stop reason: HOSPADM

## 2024-09-13 RX ORDER — HEPARIN SODIUM (PORCINE) LOCK FLUSH IV SOLN 100 UNIT/ML 100 UNIT/ML
5 SOLUTION INTRAVENOUS
Status: CANCELLED | OUTPATIENT
Start: 2024-10-01

## 2024-09-13 RX ORDER — DIPHENHYDRAMINE HCL 25 MG
50 CAPSULE ORAL
Status: COMPLETED | OUTPATIENT
Start: 2024-09-13 | End: 2024-09-13

## 2024-09-13 RX ORDER — HEPARIN SODIUM (PORCINE) LOCK FLUSH IV SOLN 100 UNIT/ML 100 UNIT/ML
5 SOLUTION INTRAVENOUS
Status: DISCONTINUED | OUTPATIENT
Start: 2024-09-13 | End: 2024-09-13 | Stop reason: HOSPADM

## 2024-09-13 RX ORDER — DIPHENHYDRAMINE HCL 25 MG
50 CAPSULE ORAL
Status: CANCELLED
Start: 2024-10-01

## 2024-09-13 RX ORDER — ONDANSETRON 2 MG/ML
8 INJECTION INTRAMUSCULAR; INTRAVENOUS EVERY 6 HOURS PRN
Status: CANCELLED
Start: 2024-10-01

## 2024-09-13 RX ORDER — KETOROLAC TROMETHAMINE 30 MG/ML
30 INJECTION, SOLUTION INTRAMUSCULAR; INTRAVENOUS ONCE
Status: COMPLETED | OUTPATIENT
Start: 2024-09-13 | End: 2024-09-13

## 2024-09-13 RX ORDER — MEPERIDINE HYDROCHLORIDE 25 MG/ML
25 INJECTION INTRAMUSCULAR; INTRAVENOUS; SUBCUTANEOUS EVERY 30 MIN PRN
OUTPATIENT
Start: 2024-10-09

## 2024-09-13 RX ORDER — ALBUTEROL SULFATE 90 UG/1
1-2 AEROSOL, METERED RESPIRATORY (INHALATION)
Start: 2024-10-09

## 2024-09-13 RX ORDER — EPINEPHRINE 1 MG/ML
0.3 INJECTION, SOLUTION, CONCENTRATE INTRAVENOUS EVERY 5 MIN PRN
OUTPATIENT
Start: 2024-10-09

## 2024-09-13 RX ORDER — HEPARIN SODIUM,PORCINE 10 UNIT/ML
5 VIAL (ML) INTRAVENOUS
Status: CANCELLED | OUTPATIENT
Start: 2024-10-01

## 2024-09-13 RX ORDER — OXYCODONE HYDROCHLORIDE 10 MG/1
10 TABLET ORAL EVERY 4 HOURS PRN
Qty: 15 TABLET | Refills: 0 | Status: SHIPPED | OUTPATIENT
Start: 2024-09-16 | End: 2024-09-23

## 2024-09-13 RX ADMIN — SODIUM CHLORIDE 250 ML: 9 INJECTION, SOLUTION INTRAVENOUS at 13:58

## 2024-09-13 RX ADMIN — HYDROMORPHONE HYDROCHLORIDE 1 MG: 1 INJECTION, SOLUTION INTRAMUSCULAR; INTRAVENOUS; SUBCUTANEOUS at 15:24

## 2024-09-13 RX ADMIN — HYDROMORPHONE HYDROCHLORIDE 1 MG: 1 INJECTION, SOLUTION INTRAMUSCULAR; INTRAVENOUS; SUBCUTANEOUS at 13:25

## 2024-09-13 RX ADMIN — HYDROMORPHONE HYDROCHLORIDE 1 MG: 1 INJECTION, SOLUTION INTRAMUSCULAR; INTRAVENOUS; SUBCUTANEOUS at 14:25

## 2024-09-13 RX ADMIN — KETOROLAC TROMETHAMINE 30 MG: 30 INJECTION, SOLUTION INTRAMUSCULAR; INTRAVENOUS at 13:28

## 2024-09-13 RX ADMIN — ONDANSETRON 8 MG: 2 INJECTION INTRAMUSCULAR; INTRAVENOUS at 13:52

## 2024-09-13 RX ADMIN — Medication 5 ML: at 16:15

## 2024-09-13 RX ADMIN — Medication 500 UNITS: at 12:31

## 2024-09-13 RX ADMIN — SODIUM CHLORIDE 353.5 MG: 9 INJECTION, SOLUTION INTRAVENOUS at 13:58

## 2024-09-13 RX ADMIN — DIPHENHYDRAMINE HYDROCHLORIDE 50 MG: 25 CAPSULE ORAL at 13:29

## 2024-09-13 RX ADMIN — SODIUM CHLORIDE, POTASSIUM CHLORIDE, SODIUM LACTATE AND CALCIUM CHLORIDE 1000 ML: 600; 310; 30; 20 INJECTION, SOLUTION INTRAVENOUS at 13:24

## 2024-09-13 ASSESSMENT — PAIN SCALES - GENERAL: PAINLEVEL: SEVERE PAIN (7)

## 2024-09-13 NOTE — PROGRESS NOTES
Infusion Nursing Note:  Jennifer Cervantes presents today for monthly Crizanlizumab, IVF and Pain meds.    Patient seen by provider today: No   present during visit today: Not Applicable.    Note: Pt presents to infusion in 7/10 generalized pain, consistent with her typical sickle cell pain. She denies fevers/chills, cough/congestion, SOB, chest pain, nausea, bruising/bleeding, diarrhea/constipation or further concerns today.    PO Benadryl, IV Zofran and IV Toradol given per therapy plan. 1mg Dilaudid given x3 one hour apart. Jennifer rates her pain at 4/10 at the end of her infusions and feels comfortable discharging home.    Intravenous Access:  Implanted Port.    Treatment Conditions:  Lab Results   Component Value Date    HGB 7.3 (L) 09/13/2024    WBC 12.9 (H) 09/13/2024    ANEU 8.1 08/25/2024    ANEUTAUTO 9.0 (H) 09/13/2024     (H) 09/13/2024        Lab Results   Component Value Date     09/13/2024    POTASSIUM 3.8 09/13/2024    MAG 2.0 05/16/2024    CR 0.49 (L) 09/13/2024    MICAH 9.0 09/13/2024    BILITOTAL 3.0 (H) 09/10/2024    ALBUMIN 4.5 09/10/2024    ALT 23 09/10/2024    AST 46 (H) 09/10/2024         Post Infusion Assessment:  Patient tolerated infusion without incident.  Blood return noted pre and post infusion.  Site patent and intact, free from redness, edema or discomfort.  No evidence of extravasations.  Access discontinued per protocol.       Discharge Plan:   Patient declined prescription refills.  Discharge instructions reviewed with: Patient.  Patient and/or family verbalized understanding of discharge instructions and all questions answered.  AVS to patient via eflowT. Patient will return 10/11/24 for next appointment.   Patient discharged in stable condition accompanied by: self.  Departure Mode: Ambulatory.    ~~~ NOTE: If the patient answers yes to any of the questions below, hold the infusion and contact ordering provider or on-call provider.    Do you currently have any  signs of illness or infection or are you on any antibiotics? No  Have you recently had an elevated temperature, fever, chills, productive cough, coughing for 3 weeks or longer or hemoptysis, abnormal vital signs, night sweats, chest pain or have you noticed a decrease in your appetite, unexplained weight loss or fatigue? No  Have you had any new, sudden, or worsening abdominal pain? No  Do you have any open wounds or new incisions? (exclude for patients with hidradenitis suppurativa) No  Have you recently been diagnosed with any new nervous system diseases (ie. Multiple sclerosis, Guillain [place], seizures, neurological changes) or cancer diagnosis? Are you on any form of radiation or chemotherapy? No  Have there been any other new onset medical symptoms? No  Are you pregnant or breast feeding or do you have plans of pregnancy in the future? No; N/A  Do you have any upcoming hospitalizations or surgeries? Does not include esophagogastroduodenoscopy, colonoscopy, endoscopic retrograde cholangiopancreatography (ERCP), endoscopic ultrasound (EUS), dental procedures (including cleanings, fillings, implants, extractions)  or joint aspiration/steroid injections No   Have you or anyone in your household received a live vaccination in the past 4 weeks? Please note: No live vaccines while on biologic/chemotherapy until 6 months after the last treatment. Patient can receive the flu vaccine (shot only).  It is optimal for the patient to get it mid cycle, but it can be given at any time as long as it is not on the day of the infusion. No      Jaida Kelly RN

## 2024-09-13 NOTE — TELEPHONE ENCOUNTER
"Oncology Nurse Triage - Sickle Cell Pain Crisis:  Situation: Jennifer  calling about Sickle Cell Pain Crisis, requesting to be added on for IV fluids and pain medicine    Background:   Patient's last infusion was 9/9/24  Last clinic visit date: 8/15/24 w/ Patricia   Does patient have active treatment plan?  Yes    Assessment of Symptoms:  Onset/Duration of symptoms: 1 day    Is it typical sickle cell pain? Yes   Location: \"all over\"  Character: Achy           Intensity: 7/10    Any radiation of pain, numbness, tingling, weakness, warmth, swelling, discoloration of arms or legs?No     Fever?No    Chest Pain Present: No     Shortness of breath: No     Other home therapies tried: HEAT/HEATING PAD     Last home medication taken and when: 0600 IBU and Oxycodone    Any Refills Needed?: No -requests refill for Oxycodone for Monday 9/16 if provider allows    Does patient have transportation & length of time to get to clinic: No -already has transportation set up for 12 pm appts.     Recommendations:   Call to [facility] infusion, spoke w/ charge nurse Andrei, who states they can add on IVF/pain meds. Pt meets protocol.            "

## 2024-09-13 NOTE — PATIENT INSTRUCTIONS
[facility] Triage and after hours / weekends / holidays:  [phone]    Please call the triage or after hours line if you experience a temperature greater than or equal to 100.4, shaking chills, have uncontrolled nausea, vomiting and/or diarrhea, dizziness, shortness of breath, chest pain, bleeding, unexplained bruising, or if you have any other new/concerning symptoms, questions or concerns.      If you are having any concerning symptoms or wish to speak to a provider before your next infusion visit, please call triage to notify them so we can adequately serve you.     If you need a refill on a narcotic prescription or other medication, please call before your infusion appointment.                September 2024 Sunday Monday Tuesday Wednesday Thursday Friday Saturday   1     2     3    BMT INFUSION 3 HR (180 MIN)  10:30 AM   (180 min.)    BMT INFUSION NURSE   [facility] [place] 4     5    SPEC INFUSION 2 HR (120 MIN)  10:00 AM   (120 min.)    SIPC INFUSION NURSE   [facility] [place] 6     7       8     9    BMT INFUSION 3 HR (180 MIN)   9:30 AM   (180 min.)    BMT INFUSION NURSE   [facility] [place] 10    Admission  10:23 PM   [facility] Emergency Department   (Discharge: 9/11/2024) 11     12     13    LAB CENTRAL  12:00 PM   (15 min.)   [facility] D[facility]   [facility]    ONC INFUSION 4 HR (240 MIN)  12:30 PM   (240 min.)    ONC INFUSION NURSE   [facility] 14       15     16     17     18    XR CHEST 2 VIEWS  11:45 AM   (20 min.)   [id number]   [facility] [place]    FULL PFT W-WO MIP/MEP/MVV  12:15 PM   (60 min.)    PFL B   [facility] Pul[facility] Function Testing [place]    FENO   1:15 PM   (30 min.)    PFL [facility] Function [facility]  [place]    RETURN ASTHMA   2:40 PM   (40 min.)   Jake Harvey MD   [facility] [place] 19     20     21       22     23     24     25     26    NEUROTOXIN   8:15 AM   (60 min.)   Katherine Pierce MD   [facility] 27 28 29 30 October 2024 Sunday Monday Tuesday Wednesday Thursday Friday Saturday             1     2     3    [facility] RETURN   4:15 PM   (30 min.)   Suraj Case MD   [facility] [place] 4     5       6     7     8     9     10     11    LAB CENTRAL  10:00 AM   (15 min.)   [facility]   [facility]    RETURN ACTIVE TREATMENT  10:15 AM   (45 min.)   Patricia Mantilla CNP   [facility]    ONC INFUSION 4 HR (240 MIN)  11:30 AM   (240 min.)    ONC INFUSION NURSE   [facility] 12       13     14     15     16     17     18     19       20     21    LAB CENTRAL  12:30 PM   (15 min.)    [facility]   [facility]    RETURN CCSL  12:45 PM   (30 min.)   Eric Duncan MD   [facility] 22     23     24     25     26       27     28     29     30     31                               Lab Results:  Recent Results (from the past 12 hour(s))   Reticulocyte count    Collection Time: 09/13/24 12:28 PM   Result Value Ref Range    % Reticulocyte 27.4 (H) 0.5 - 2.0 %    Absolute Reticulocyte 0.668 (H) 0.025 - 0.095 10e6/uL   Basic metabolic panel    Collection Time: 09/13/24 12:28 PM   Result Value Ref Range    Sodium 138 135 - 145 mmol/L    Potassium 3.8 3.4 - 5.3 mmol/L    Chloride 105 98 - 107 mmol/L    Carbon Dioxide (CO2) 21 (L) 22 - 29 mmol/L    Anion Gap 12 7 - 15 mmol/L    Urea Nitrogen 7.7 6.0 - 20.0 mg/dL    Creatinine 0.49 (L) 0.51 - 0.95 mg/dL    GFR Estimate  >90 >60 mL/min/1.73m2    Calcium 9.0 8.8 - 10.4 mg/dL    Glucose 88 70 - 99 mg/dL   CBC with platelets and differential    Collection Time: 09/13/24 12:28 PM   Result Value Ref Range    WBC Count 12.9 (H) 4.0 - 11.0 10e3/uL    RBC Count 2.35 (L) 3.80 - 5.20 10e6/uL    Hemoglobin 7.3 (L) 11.7 - 15.7 g/dL    Hematocrit 20.9 (L) 35.0 - 47.0 %    MCV 89 78 - 100 fL    MCH 31.1 26.5 - 33.0 pg    MCHC 34.9 31.5 - 36.5 g/dL    RDW 26.7 (H) 10.0 - 15.0 %    Platelet Count 485 (H) 150 - 450 10e3/uL    % Neutrophils 70 %    % Lymphocytes 17 %    % Monocytes 7 %    % Eosinophils 3 %    % Basophils 2 %    % Immature Granulocytes 1 %    NRBCs per 100 WBC 5 (H) <1 /100    Absolute Neutrophils 9.0 (H) 1.6 - 8.3 10e3/uL    Absolute Lymphocytes 2.2 0.8 - 5.3 10e3/uL    Absolute Monocytes 1.0 0.0 - 1.3 10e3/uL    Absolute Eosinophils 0.4 0.0 - 0.7 10e3/uL    Absolute Basophils 0.2 0.0 - 0.2 10e3/uL    Absolute Immature Granulocytes 0.1 <=0.4 10e3/uL    Absolute NRBCs 0.6 10e3/uL

## 2024-09-13 NOTE — TELEPHONE ENCOUNTER
Narcotic Refill Request  Medication(s) requested:  Oxycodone 10mg tab  Person Requesting Refill: Jennifer   What pain is the medication treating: sickle cell pain  How is the medication being taken?: on average, using 4-4.5 tabs/day   Does pt have enough for today? yes  Is pain being adequately controlled on the current regimen?: yes  Experiencing any side effects from medication?: no    Date of most recent appointment:  8/15/24  Any No Show Visits: none  Next appointment:   10/11/24  Last fill date and by whom:  9/9/24 by Patricia Mantilla    Reviewed: no access    Pt requesting future fill for Monday, 9/16/24, so medication can be picked up quicker.   Send to provider: Patricia Mantilla

## 2024-09-15 ENCOUNTER — HOSPITAL ENCOUNTER (EMERGENCY)
Facility: CLINIC | Age: 24
Discharge: HOME | End: 2024-09-15
Attending: EMERGENCY MEDICINE | Admitting: EMERGENCY MEDICINE
Payer: COMMERCIAL

## 2024-09-15 ENCOUNTER — APPOINTMENT (OUTPATIENT)
Dept: GENERAL RADIOLOGY | Facility: CLINIC | Age: 24
End: 2024-09-15
Attending: EMERGENCY MEDICINE
Payer: COMMERCIAL

## 2024-09-15 VITALS
DIASTOLIC BLOOD PRESSURE: 60 MMHG | OXYGEN SATURATION: 98 % | TEMPERATURE: 97.4 F | HEIGHT: 64 IN | WEIGHT: 155 LBS | SYSTOLIC BLOOD PRESSURE: 103 MMHG | BODY MASS INDEX: 26.46 KG/M2 | RESPIRATION RATE: 16 BRPM | HEART RATE: 85 BPM

## 2024-09-15 DIAGNOSIS — D57.00 SICKLE CELL PAIN CRISIS (H): ICD-10-CM

## 2024-09-15 LAB
ANION GAP SERPL CALCULATED.3IONS-SCNC: 10 MMOL/L (ref 7–15)
ATRIAL RATE - MUSE: 89 BPM
BASOPHILS # BLD AUTO: 0.2 10E3/UL (ref 0–0.2)
BASOPHILS NFR BLD AUTO: 2 %
BUN SERPL-MCNC: 10.3 MG/DL (ref 6–20)
CALCIUM SERPL-MCNC: 8.9 MG/DL (ref 8.8–10.4)
CHLORIDE SERPL-SCNC: 109 MMOL/L (ref 98–107)
CREAT SERPL-MCNC: 0.56 MG/DL (ref 0.51–0.95)
DIASTOLIC BLOOD PRESSURE - MUSE: NORMAL MMHG
EGFRCR SERPLBLD CKD-EPI 2021: >90 ML/MIN/1.73M2
EOSINOPHIL # BLD AUTO: 0.4 10E3/UL (ref 0–0.7)
EOSINOPHIL NFR BLD AUTO: 3 %
ERYTHROCYTE [DISTWIDTH] IN BLOOD BY AUTOMATED COUNT: 25.4 % (ref 10–15)
GLUCOSE SERPL-MCNC: 86 MG/DL (ref 70–99)
HCG SERPL QL: NEGATIVE
HCO3 SERPL-SCNC: 22 MMOL/L (ref 22–29)
HCT VFR BLD AUTO: 18.1 % (ref 35–47)
HGB BLD-MCNC: 6.5 G/DL (ref 11.7–15.7)
IMM GRANULOCYTES # BLD: 0.1 10E3/UL
IMM GRANULOCYTES NFR BLD: 0 %
INTERPRETATION ECG - MUSE: NORMAL
LYMPHOCYTES # BLD AUTO: 2.5 10E3/UL (ref 0.8–5.3)
LYMPHOCYTES NFR BLD AUTO: 18 %
MCH RBC QN AUTO: 31.6 PG (ref 26.5–33)
MCHC RBC AUTO-ENTMCNC: 35.9 G/DL (ref 31.5–36.5)
MCV RBC AUTO: 88 FL (ref 78–100)
MONOCYTES # BLD AUTO: 1 10E3/UL (ref 0–1.3)
MONOCYTES NFR BLD AUTO: 7 %
NEUTROPHILS # BLD AUTO: 9.8 10E3/UL (ref 1.6–8.3)
NEUTROPHILS NFR BLD AUTO: 70 %
NRBC # BLD AUTO: 0.3 10E3/UL
NRBC BLD AUTO-RTO: 2 /100
P AXIS - MUSE: 70 DEGREES
PLATELET # BLD AUTO: 404 10E3/UL (ref 150–450)
POTASSIUM SERPL-SCNC: 4.2 MMOL/L (ref 3.4–5.3)
PR INTERVAL - MUSE: 168 MS
QRS DURATION - MUSE: 72 MS
QT - MUSE: 384 MS
QTC - MUSE: 467 MS
R AXIS - MUSE: 45 DEGREES
RBC # BLD AUTO: 2.06 10E6/UL (ref 3.8–5.2)
RETICS # AUTO: 0.57 10E6/UL (ref 0.03–0.1)
RETICS/RBC NFR AUTO: 25.6 % (ref 0.5–2)
SODIUM SERPL-SCNC: 141 MMOL/L (ref 135–145)
SYSTOLIC BLOOD PRESSURE - MUSE: NORMAL MMHG
T AXIS - MUSE: 40 DEGREES
TROPONIN T SERPL HS-MCNC: <6 NG/L
VENTRICULAR RATE- MUSE: 89 BPM
WBC # BLD AUTO: 14 10E3/UL (ref 4–11)

## 2024-09-15 PROCEDURE — 99285 EMERGENCY DEPT VISIT HI MDM: CPT | Performed by: FAMILY MEDICINE

## 2024-09-15 PROCEDURE — 80048 BASIC METABOLIC PNL TOTAL CA: CPT | Performed by: EMERGENCY MEDICINE

## 2024-09-15 PROCEDURE — 96375 TX/PRO/DX INJ NEW DRUG ADDON: CPT | Performed by: FAMILY MEDICINE

## 2024-09-15 PROCEDURE — 96374 THER/PROPH/DIAG INJ IV PUSH: CPT | Performed by: FAMILY MEDICINE

## 2024-09-15 PROCEDURE — 96361 HYDRATE IV INFUSION ADD-ON: CPT | Performed by: FAMILY MEDICINE

## 2024-09-15 PROCEDURE — 36415 COLL VENOUS BLD VENIPUNCTURE: CPT | Performed by: EMERGENCY MEDICINE

## 2024-09-15 PROCEDURE — 96376 TX/PRO/DX INJ SAME DRUG ADON: CPT | Performed by: FAMILY MEDICINE

## 2024-09-15 PROCEDURE — 250N000011 HC RX IP 250 OP 636: Performed by: FAMILY MEDICINE

## 2024-09-15 PROCEDURE — 84484 ASSAY OF TROPONIN QUANT: CPT | Performed by: EMERGENCY MEDICINE

## 2024-09-15 PROCEDURE — 93010 ELECTROCARDIOGRAM REPORT: CPT | Performed by: FAMILY MEDICINE

## 2024-09-15 PROCEDURE — 99285 EMERGENCY DEPT VISIT HI MDM: CPT | Mod: 25 | Performed by: FAMILY MEDICINE

## 2024-09-15 PROCEDURE — 93005 ELECTROCARDIOGRAM TRACING: CPT | Performed by: FAMILY MEDICINE

## 2024-09-15 PROCEDURE — 250N000011 HC RX IP 250 OP 636: Performed by: EMERGENCY MEDICINE

## 2024-09-15 PROCEDURE — 85045 AUTOMATED RETICULOCYTE COUNT: CPT | Performed by: EMERGENCY MEDICINE

## 2024-09-15 PROCEDURE — 258N000003 HC RX IP 258 OP 636: Performed by: EMERGENCY MEDICINE

## 2024-09-15 PROCEDURE — 72100 X-RAY EXAM L-S SPINE 2/3 VWS: CPT

## 2024-09-15 PROCEDURE — 85025 COMPLETE CBC W/AUTO DIFF WBC: CPT | Performed by: EMERGENCY MEDICINE

## 2024-09-15 PROCEDURE — 84703 CHORIONIC GONADOTROPIN ASSAY: CPT | Performed by: EMERGENCY MEDICINE

## 2024-09-15 PROCEDURE — 72070 X-RAY EXAM THORAC SPINE 2VWS: CPT

## 2024-09-15 RX ORDER — SODIUM CHLORIDE, SODIUM LACTATE, POTASSIUM CHLORIDE, CALCIUM CHLORIDE 600; 310; 30; 20 MG/100ML; MG/100ML; MG/100ML; MG/100ML
1000 INJECTION, SOLUTION INTRAVENOUS CONTINUOUS
Status: DISCONTINUED | OUTPATIENT
Start: 2024-09-15 | End: 2024-09-15 | Stop reason: HOSPADM

## 2024-09-15 RX ORDER — HEPARIN SODIUM (PORCINE) LOCK FLUSH IV SOLN 100 UNIT/ML 100 UNIT/ML
100 SOLUTION INTRAVENOUS ONCE
Status: COMPLETED | OUTPATIENT
Start: 2024-09-15 | End: 2024-09-15

## 2024-09-15 RX ORDER — ONDANSETRON 2 MG/ML
8 INJECTION INTRAMUSCULAR; INTRAVENOUS ONCE
Status: COMPLETED | OUTPATIENT
Start: 2024-09-15 | End: 2024-09-15

## 2024-09-15 RX ORDER — KETOROLAC TROMETHAMINE 15 MG/ML
15 INJECTION, SOLUTION INTRAMUSCULAR; INTRAVENOUS ONCE
Status: COMPLETED | OUTPATIENT
Start: 2024-09-15 | End: 2024-09-15

## 2024-09-15 RX ADMIN — SODIUM CHLORIDE, POTASSIUM CHLORIDE, SODIUM LACTATE AND CALCIUM CHLORIDE 1000 ML: 600; 310; 30; 20 INJECTION, SOLUTION INTRAVENOUS at 05:44

## 2024-09-15 RX ADMIN — HYDROMORPHONE HYDROCHLORIDE 1 MG: 1 INJECTION, SOLUTION INTRAMUSCULAR; INTRAVENOUS; SUBCUTANEOUS at 08:41

## 2024-09-15 RX ADMIN — SODIUM CHLORIDE, POTASSIUM CHLORIDE, SODIUM LACTATE AND CALCIUM CHLORIDE 1000 ML: 600; 310; 30; 20 INJECTION, SOLUTION INTRAVENOUS at 06:40

## 2024-09-15 RX ADMIN — HYDROMORPHONE HYDROCHLORIDE 1 MG: 1 INJECTION, SOLUTION INTRAMUSCULAR; INTRAVENOUS; SUBCUTANEOUS at 06:39

## 2024-09-15 RX ADMIN — HYDROMORPHONE HYDROCHLORIDE 1 MG: 1 INJECTION, SOLUTION INTRAMUSCULAR; INTRAVENOUS; SUBCUTANEOUS at 05:40

## 2024-09-15 RX ADMIN — HEPARIN 100 UNITS: 100 SYRINGE at 10:49

## 2024-09-15 RX ADMIN — ONDANSETRON 8 MG: 2 INJECTION INTRAMUSCULAR; INTRAVENOUS at 05:40

## 2024-09-15 RX ADMIN — KETOROLAC TROMETHAMINE 15 MG: 15 INJECTION, SOLUTION INTRAMUSCULAR; INTRAVENOUS at 05:40

## 2024-09-15 ASSESSMENT — ACTIVITIES OF DAILY LIVING (ADL)
ADLS_ACUITY_SCORE: 37

## 2024-09-15 NOTE — ED PROVIDER NOTES
ED Provider Note  [facility]      History     Chief Complaint   Patient presents with    Back Pain     For past 2 or 3 hours sharp constant back pain    Sickle Cell Pain Crisis     HPI  Jennifer Cervantes is a 24 year old female who presents with back pain.   Acute onset this evening. No trauma. Does not radiate to chest or abdomen.   NO past spinal surgeries.   No SOB.  No leg pain or calf swelling.   Hx of sickle cell anemia.   Has IV contrast dye allergy    Past Medical History  Past Medical History:   Diagnosis Date    Anxiety     Bleeding disorder (H24)     Blood clotting disorder (H24)     Cerebral infarction (H) 2015    Cognitive developmental delay     low IQ. 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 side (H)     right hand contractures    Iron overload due to repeated red blood cell transfusions     Migraines     Multiple subsegmental pulmonary emboli without acute cor pulmonale (H) 02/01/2021    Oppositional defiant behavior     Presence of intrauterine contraceptive device 2/18/2020    Superficial venous thrombosis of arm, right 03/25/2021    Uncomplicated asthma      Past Surgical History:   Procedure Laterality Date    AS INSERT TUNNELED CV 2 CATH W/O PORT/PUMP      CHOLECYSTECTOMY      CV RIGHT HEART CATH MEASUREMENTS RECORDED N/A 11/18/2021    Procedure: Right Heart Cath;  Surgeon: Jackson Stauffer MD;  Location:  HEART CARDIAC CATH LAB    INSERT PORT VASCULAR ACCESS Left 4/21/2021    Procedure: INSERTION, VASCULAR ACCESS PORT (NOT SURE ON SIDE UNTIL REMOVAL);  Surgeon: Rajan More MD;  Location: [facility] OR    IR CHEST PORT PLACEMENT > 5 YRS OF AGE  4/21/2021    IR CVC NON TUNNEL LINE REMOVAL  5/6/2021    IR CVC NON TUNNEL PLACEMENT > 5 YRS  04/07/2020    IR CVC NON TUNNEL PLACEMENT > 5 YRS  4/30/2021    IR CVC NON TUNNEL PLACEMENT > 5 YRS  9/7/2022     IR PORT REMOVAL LEFT  4/21/2021    REMOVE PORT VASCULAR ACCESS Left 4/21/2021    Procedure: REMOVAL, VASCULAR ACCESS PORT LEFT;  Surgeon: Rajan More MD;  Location: [facility] OR    REPAIR TENDON ELBOW Right 10/02/2019    Procedure: Right Elbow Flexor Lengthening, Flexor Pronator Slide Of Wrist and Finger, Thumb Adductor Lengthening;  Surgeon: Anai Franco MD;  Location: [facility] OR    TONSILLECTOMY Bilateral 10/02/2019    Procedure: Bilateral Tonsillectomy;  Surgeon: Farhana Guy MD;  Location: [facility] OR    ZZC BREAST REDUCTION (INCLUDES LIPO) TIER 3 Bilateral 04/23/2019     acetaminophen (TYLENOL) 325 MG tablet  albuterol (PROAIR HFA/PROVENTIL HFA/VENTOLIN HFA) 108 (90 Base) MCG/ACT inhaler  albuterol (PROVENTIL) (2.5 MG/3ML) 0.083% neb solution  aspirin (ASA) 81 MG chewable tablet  budesonide-formoterol (SYMBICORT) 160-4.5 MCG/ACT Inhaler  deferasirox (JADENU) 360 MG tablet  EPINEPHrine (ANY BX GENERIC EQUIV) 0.3 MG/0.3ML injection 2-pack  gabapentin (NEURONTIN) 300 MG capsule  hydroxyurea (HYDREA) 500 MG capsule  ibuprofen (ADVIL/MOTRIN) 600 MG tablet  ibuprofen (ADVIL/MOTRIN) 800 MG tablet  melatonin 5 MG tablet  methocarbamol (ROBAXIN) 750 MG tablet  naloxone (NARCAN) 4 MG/0.1ML nasal spray  naloxone (NARCAN) 4 MG/0.1ML nasal spray  omeprazole (PRILOSEC) 20 MG DR capsule  ondansetron (ZOFRAN ODT) 4 MG ODT tab  ondansetron (ZOFRAN ODT) 8 MG ODT tab  oxyCODONE IR (ROXICODONE) 10 MG tablet      Allergies   Allergen Reactions    Contrast Dye Angioedema     Hives and breathing issues    Fish-Derived Products Hives    Seafood Hives    Adhesive Tape Hives     Primipore dressing causes hives    Gadolinium     Iodinated Contrast Media      Family History  Family History   Problem Relation Age of Onset    Sickle Cell Trait Mother     Hypertension Mother     Asthma Mother     Sickle Cell Trait Father     Glaucoma No family hx of     Macular Degeneration No family hx of     Diabetes No family hx of     Gout  "No family hx of      Social History   Social History     Tobacco Use    Smoking status: Never     Passive exposure: Never    Smokeless tobacco: Never   Substance Use Topics    Alcohol use: Not Currently     Alcohol/week: 0.0 standard drinks of alcohol    Drug use: Never      A medically appropriate review of systems was performed with pertinent positives and negatives noted in the HPI, and all other systems negative.    Physical Exam   BP: 118/74  Pulse: 92  Temp: 97.4  F (36.3  C)  Resp: 18  Height: 162.6 cm (5' 4\")  Weight: 70.3 kg (155 lb)  SpO2: 94 %    Physical Exam  Gen:A&Ox3, uncomfortable.  HEENT:PERRL, no facial tenderness or wounds, head atraumatic, oropharynx clear, mucous membranes moist, TMs clear bilaterally  Neck:no bony tenderness or step offs, no JVD, trachea midline  Back: no CVA tenderness, no lateral rib tenderness, pain at low T and upper L spine  CV:RRR without murmurs  PULM:Clear to auscultation bilaterally  Abd:soft, nontender, nondistended. Bowel sounds present and normal  UE:No traumatic injuries, skin normal  LE:no traumatic injuries, skin normal  Neuro:CN II-XII intact, hx of prior stroke, no new deficits  Skin: no rashes or ecchymoses      ED Course, Procedures, & Data      Procedures            EKG Interpretation:      Interpreted by Cornelia Malloy MD  Time reviewed: 7:09AM  Symptoms at time of EKG: back pain   Rhythm: normal sinus   Rate: 89  Axis: normal  Ectopy: none  Conduction: normal  ST Segments/ T Waves: No ST-T wave changes  Q Waves: none  Comparison to prior: Unchanged    Clinical Impression: normal EKG     Results for orders placed or performed during the hospital encounter of 09/15/24   Lumbar spine XR, 2-3 views     Status: None    Narrative    EXAM: XR LUMBAR SPINE 2/3 VIEWS  LOCATION: [facility]  DATE: 09/15/2024    INDICATION: Back pain, history of sickle cell.  COMPARISON: None.      Impression    IMPRESSION: No " fracture. Normal vertebral heights and alignment. Normal disc spaces and facets for age. Normal extraspinal structures.     XR Thoracic Spine 2 Views     Status: None    Narrative    EXAM: XR THORACIC SPINE 2 VIEWS  LOCATION: [facility]  DATE: 09/15/2024    INDICATION: Back pain, history of sickle cell.  COMPARISON: None.      Impression    IMPRESSION: No fracture. Normal vertebral heights and alignment. Normal disc spaces for age. Normal extraspinal structures.      Basic metabolic panel     Status: Abnormal   Result Value Ref Range    Sodium 141 135 - 145 mmol/L    Potassium 4.2 3.4 - 5.3 mmol/L    Chloride 109 (H) 98 - 107 mmol/L    Carbon Dioxide (CO2) 22 22 - 29 mmol/L    Anion Gap 10 7 - 15 mmol/L    Urea Nitrogen 10.3 6.0 - 20.0 mg/dL    Creatinine 0.56 0.51 - 0.95 mg/dL    GFR Estimate >90 >60 mL/min/1.73m2    Calcium 8.9 8.8 - 10.4 mg/dL    Glucose 86 70 - 99 mg/dL   HCG qualitative pregnancy (blood)     Status: Normal   Result Value Ref Range    hCG Serum Qualitative Negative Negative   Reticulocyte count     Status: Abnormal   Result Value Ref Range    % Reticulocyte 25.6 (H) 0.5 - 2.0 %    Absolute Reticulocyte 0.572 (H) 0.025 - 0.095 10e6/uL   Troponin T, High Sensitivity     Status: Normal   Result Value Ref Range    Troponin T, High Sensitivity <6 <=14 ng/L   CBC with platelets and differential     Status: Abnormal   Result Value Ref Range    WBC Count 14.0 (H) 4.0 - 11.0 10e3/uL    RBC Count 2.06 (L) 3.80 - 5.20 10e6/uL    Hemoglobin 6.5 (LL) 11.7 - 15.7 g/dL    Hematocrit 18.1 (L) 35.0 - 47.0 %    MCV 88 78 - 100 fL    MCH 31.6 26.5 - 33.0 pg    MCHC 35.9 31.5 - 36.5 g/dL    RDW 25.4 (H) 10.0 - 15.0 %    Platelet Count 404 150 - 450 10e3/uL    % Neutrophils 70 %    % Lymphocytes 18 %    % Monocytes 7 %    % Eosinophils 3 %    % Basophils 2 %    % Immature Granulocytes 0 %    NRBCs per 100 WBC 2 (H) <1 /100    Absolute Neutrophils 9.8 (H) 1.6 - 8.3  10e3/uL    Absolute Lymphocytes 2.5 0.8 - 5.3 10e3/uL    Absolute Monocytes 1.0 0.0 - 1.3 10e3/uL    Absolute Eosinophils 0.4 0.0 - 0.7 10e3/uL    Absolute Basophils 0.2 0.0 - 0.2 10e3/uL    Absolute Immature Granulocytes 0.1 <=0.4 10e3/uL    Absolute NRBCs 0.3 10e3/uL   EKG 12 lead     Status: None   Result Value Ref Range    Systolic Blood Pressure  mmHg    Diastolic Blood Pressure  mmHg    Ventricular Rate 89 BPM    Atrial Rate 89 BPM    HI Interval 168 ms    QRS Duration 72 ms     ms    QTc 467 ms    P Axis 70 degrees    R AXIS 45 degrees    T Axis 40 degrees    Interpretation ECG       Sinus rhythm  Normal ECG  Unconfirmed report - interpretation of this ECG is computer generated - see medical record for final interpretation  Confirmed by - EMERGENCY ROOM, PHYSICIAN ([id number]),  RACHEL CARRASQUILLO ([id number]) on 9/15/2024 2:43:13 PM     CBC with platelets differential     Status: Abnormal    Narrative    The following orders were created for panel order CBC with platelets differential.  Procedure                               Abnormality         Status                     ---------                               -----------         ------                     CBC with platelets and d...[[id number]]  Abnormal            Final result                 Please view results for these tests on the individual orders.     Medications   HYDROmorphone (DILAUDID) injection 1 mg (1 mg Intravenous $Given 9/15/24 0841)   ketorolac (TORADOL) injection 15 mg (15 mg Intravenous $Given 9/15/24 1340)   lactated ringers BOLUS 1,000 mL (0 mLs Intravenous Stopped 9/15/24 0708)   ondansetron (ZOFRAN) injection 8 mg (8 mg Intravenous $Given 9/15/24 0540)   heparin lock flush 100 unit/mL injection 100 Units (100 Units Intravenous $Given 9/15/24 1049)     Labs Ordered and Resulted from Time of ED Arrival to Time of ED Departure   BASIC METABOLIC PANEL - Abnormal       Result Value    Sodium 141      Potassium 4.2      Chloride 109 (*)      Carbon Dioxide (CO2) 22      Anion Gap 10      Urea Nitrogen 10.3      Creatinine 0.56      GFR Estimate >90      Calcium 8.9      Glucose 86     RETICULOCYTE COUNT - Abnormal    % Reticulocyte 25.6 (*)     Absolute Reticulocyte 0.572 (*)    CBC WITH PLATELETS AND DIFFERENTIAL - Abnormal    WBC Count 14.0 (*)     RBC Count 2.06 (*)     Hemoglobin 6.5 (*)     Hematocrit 18.1 (*)     MCV 88      MCH 31.6      MCHC 35.9      RDW 25.4 (*)     Platelet Count 404      % Neutrophils 70      % Lymphocytes 18      % Monocytes 7      % Eosinophils 3      % Basophils 2      % Immature Granulocytes 0      NRBCs per 100 WBC 2 (*)     Absolute Neutrophils 9.8 (*)     Absolute Lymphocytes 2.5      Absolute Monocytes 1.0      Absolute Eosinophils 0.4      Absolute Basophils 0.2      Absolute Immature Granulocytes 0.1      Absolute NRBCs 0.3     HCG QUALITATIVE PREGNANCY - Normal    hCG Serum Qualitative Negative     TROPONIN T, HIGH SENSITIVITY - Normal    Troponin T, High Sensitivity <6       Lumbar spine XR, 2-3 views   Final Result   IMPRESSION: No fracture. Normal vertebral heights and alignment. Normal disc spaces and facets for age. Normal extraspinal structures.         XR Thoracic Spine 2 Views   Final Result   IMPRESSION: No fracture. Normal vertebral heights and alignment. Normal disc spaces for age. Normal extraspinal structures.                 Critical care was not performed.     Medical Decision Making  The patient's presentation was of high complexity (a chronic illness severe exacerbation, progression, or side effect of treatment).    The patient's evaluation involved:  ordering and/or review of 3+ test(s) in this encounter (see separate area of note for details)    The patient's management necessitated high risk (a parenteral controlled substance).    Assessment & Plan    23 yo F with a hx of sickle cell disease presenting with back pain. Atraumatic onset. Has had pain in this area before but more focal than her  "typical sickle cell exacerbation.   Vitals stable, including normal RR and work of breathing. She is without symptoms of shortness of breath.   IV access obtained and lab testing included CBC, BMP, reticulocyte count, troponin, HCG and xray of the T and L spine to assess for new compression fractures. Reviewed most recent CT chest imaging from 8/21/24 that shows \"Bone detail shows minimal gentle dextrocurvature centered at the  midthoracic spine.\" Last LE US on 8/31/24 negative for DVT. My clinical suspicion for acute PE and acute chest syndrome are low.     I reviewed her ED sickle cell care plan and she was treated with IV fluid and anagesia per her plan.     Signed out to Dr. Valdez at 7AM pending xray imaging and reassessment of pain management.     I have reviewed the nursing notes. I have reviewed the findings, diagnosis, plan and need for follow up with the patient.    Discharge Medication List as of 9/15/2024 11:03 AM          Final diagnoses:   Sickle cell pain crisis (H)       Cornelia Malloy MD   [facility] EMERGENCY DEPARTMENT  9/15/2024     Cornelia Malloy MD  09/16/24 1650    "

## 2024-09-15 NOTE — DISCHARGE INSTRUCTIONS
Please continue your home pain regimen and follow-up closely with your hematologist regarding your lower hemoglobin values to discuss if there is need for another outpatient transfusion.

## 2024-09-15 NOTE — ED PROVIDER NOTES
"     Emergency Department Patient Sign-out       Brief HPI:  This is a 24 year old female signed out to me by Dr. Malloy .  See initial ED Provider note for details of the presentation.            Significant Events prior to my assuming care: Patient with a history of sickle cell disease, followed by the hematology clinic and has a care plan for the ED and inpatient care in [facility].  Presented with nontraumatic mid back pain.  Per report patient has localized tenderness in this area, no pleuritic component, no shortness of breath, no fever chills or signs of sepsis, no real suspicion for epidural abscess, osteomyelitis, discitis.  Their EKG and troponin did not show any signs of acute ischemia, and labs are significant for hemoglobin slightly below baseline (6.5) and reticulocyte count elevated but at baseline.  The other labs are unremarkable.  Imaging of the L-spine and T-spine is pending at the time of signout, patient's care plan for pain management is being enacted.      Exam:   Patient Vitals for the past 24 hrs:   BP Temp Pulse Resp SpO2 Height Weight   09/15/24 0504 118/74 97.4  F (36.3  C) 92 18 94 % 1.626 m (5' 4\") 70.3 kg (155 lb)       EXAM:  HEENT: Normal.  Oropharynx clear and moist.  Neck: Supple, trachea midline, normal voice  Chest:  No respiratory distress, speaks in complete sentences, chest wall nontender, lungs clear in all fields  CV: Regular rate and rhythm, no murmur, normal pulse, no jugular venous distention  Abdomen: Nondistended, soft nontender.  No hepatosplenomegaly.  Extremities: No edema or tenderness        ED RESULTS:   Results for orders placed or performed during the hospital encounter of 09/15/24 (from the past 24 hour(s))   CBC with platelets differential     Status: Abnormal    Collection Time: 09/15/24  6:23 AM    Narrative    The following orders were created for panel order CBC with platelets differential.  Procedure                               Abnormality         Status       "               ---------                               -----------         ------                     CBC with platelets and d...[[id number]]  Abnormal            Final result                 Please view results for these tests on the individual orders.   Basic metabolic panel     Status: Abnormal    Collection Time: 09/15/24  6:23 AM   Result Value Ref Range    Sodium 141 135 - 145 mmol/L    Potassium 4.2 3.4 - 5.3 mmol/L    Chloride 109 (H) 98 - 107 mmol/L    Carbon Dioxide (CO2) 22 22 - 29 mmol/L    Anion Gap 10 7 - 15 mmol/L    Urea Nitrogen 10.3 6.0 - 20.0 mg/dL    Creatinine 0.56 0.51 - 0.95 mg/dL    GFR Estimate >90 >60 mL/min/1.73m2    Calcium 8.9 8.8 - 10.4 mg/dL    Glucose 86 70 - 99 mg/dL   HCG qualitative pregnancy (blood)     Status: Normal    Collection Time: 09/15/24  6:23 AM   Result Value Ref Range    hCG Serum Qualitative Negative Negative   Reticulocyte count     Status: Abnormal    Collection Time: 09/15/24  6:23 AM   Result Value Ref Range    % Reticulocyte 25.6 (H) 0.5 - 2.0 %    Absolute Reticulocyte 0.572 (H) 0.025 - 0.095 10e6/uL   Troponin T, High Sensitivity     Status: Normal    Collection Time: 09/15/24  6:23 AM   Result Value Ref Range    Troponin T, High Sensitivity <6 <=14 ng/L   CBC with platelets and differential     Status: Abnormal    Collection Time: 09/15/24  6:23 AM   Result Value Ref Range    WBC Count 14.0 (H) 4.0 - 11.0 10e3/uL    RBC Count 2.06 (L) 3.80 - 5.20 10e6/uL    Hemoglobin 6.5 (LL) 11.7 - 15.7 g/dL    Hematocrit 18.1 (L) 35.0 - 47.0 %    MCV 88 78 - 100 fL    MCH 31.6 26.5 - 33.0 pg    MCHC 35.9 31.5 - 36.5 g/dL    RDW 25.4 (H) 10.0 - 15.0 %    Platelet Count 404 150 - 450 10e3/uL    % Neutrophils 70 %    % Lymphocytes 18 %    % Monocytes 7 %    % Eosinophils 3 %    % Basophils 2 %    % Immature Granulocytes 0 %    NRBCs per 100 WBC 2 (H) <1 /100    Absolute Neutrophils 9.8 (H) 1.6 - 8.3 10e3/uL    Absolute Lymphocytes 2.5 0.8 - 5.3 10e3/uL    Absolute Monocytes 1.0  0.0 - 1.3 10e3/uL    Absolute Eosinophils 0.4 0.0 - 0.7 10e3/uL    Absolute Basophils 0.2 0.0 - 0.2 10e3/uL    Absolute Immature Granulocytes 0.1 <=0.4 10e3/uL    Absolute NRBCs 0.3 10e3/uL       ED MEDICATIONS:   Medications   HYDROmorphone (DILAUDID) injection 1 mg (1 mg Intravenous $Given 9/15/24 0639)   lactated ringers infusion 1,000 mL (1,000 mLs Intravenous Not Given 9/15/24 0725)   ketorolac (TORADOL) injection 15 mg (15 mg Intravenous $Given 9/15/24 0540)   lactated ringers BOLUS 1,000 mL (0 mLs Intravenous Stopped 9/15/24 0708)   ondansetron (ZOFRAN) injection 8 mg (8 mg Intravenous $Given 9/15/24 0540)         Impression:  No diagnosis found.    Plan:    Pending studies include imaging of CT spine/T-spine.    Spine imaging normal.  Patient received her meds through her care plan reports that she is feeling better and would like to be discharged.  Her hemoglobin is slightly lower than baseline, patient tells me she frequently has outpatient transfusions at the infusion center and that this particular value is around the level where they frequently transfuse her.  She feels well enough to go home and plans to follow-up with her hematologist tomorrow, understands the indications for emergency department return appears to be stable and at baseline for discharge.    MD Courtney Morrison David, MD  09/15/24 2158

## 2024-09-16 ENCOUNTER — NURSE TRIAGE (OUTPATIENT)
Dept: ONCOLOGY | Facility: CLINIC | Age: 24
End: 2024-09-16
Payer: COMMERCIAL

## 2024-09-16 ENCOUNTER — INFUSION THERAPY VISIT (OUTPATIENT)
Dept: INFUSION THERAPY | Facility: CLINIC | Age: 24
End: 2024-09-16
Attending: PEDIATRICS
Payer: COMMERCIAL

## 2024-09-16 ENCOUNTER — PATIENT OUTREACH (OUTPATIENT)
Dept: CARE COORDINATION | Facility: CLINIC | Age: 24
End: 2024-09-16
Payer: COMMERCIAL

## 2024-09-16 VITALS
HEART RATE: 85 BPM | RESPIRATION RATE: 18 BRPM | OXYGEN SATURATION: 98 % | SYSTOLIC BLOOD PRESSURE: 107 MMHG | DIASTOLIC BLOOD PRESSURE: 67 MMHG

## 2024-09-16 DIAGNOSIS — D57.00 SICKLE CELL PAIN CRISIS (H): Primary | ICD-10-CM

## 2024-09-16 DIAGNOSIS — G81.10 SPASTIC HEMIPLEGIA, UNSPECIFIED ETIOLOGY, UNSPECIFIED LATERALITY (H): ICD-10-CM

## 2024-09-16 PROCEDURE — 250N000011 HC RX IP 250 OP 636: Performed by: PEDIATRICS

## 2024-09-16 PROCEDURE — 258N000003 HC RX IP 258 OP 636: Performed by: PEDIATRICS

## 2024-09-16 PROCEDURE — 250N000013 HC RX MED GY IP 250 OP 250 PS 637: Performed by: PEDIATRICS

## 2024-09-16 PROCEDURE — 96374 THER/PROPH/DIAG INJ IV PUSH: CPT

## 2024-09-16 PROCEDURE — 96376 TX/PRO/DX INJ SAME DRUG ADON: CPT

## 2024-09-16 PROCEDURE — 96361 HYDRATE IV INFUSION ADD-ON: CPT

## 2024-09-16 PROCEDURE — 96375 TX/PRO/DX INJ NEW DRUG ADDON: CPT

## 2024-09-16 RX ORDER — KETOROLAC TROMETHAMINE 30 MG/ML
30 INJECTION, SOLUTION INTRAMUSCULAR; INTRAVENOUS ONCE
Status: CANCELLED
Start: 2024-10-01 | End: 2024-10-01

## 2024-09-16 RX ORDER — HEPARIN SODIUM (PORCINE) LOCK FLUSH IV SOLN 100 UNIT/ML 100 UNIT/ML
5 SOLUTION INTRAVENOUS
Status: CANCELLED | OUTPATIENT
Start: 2024-10-01

## 2024-09-16 RX ORDER — DIPHENHYDRAMINE HCL 25 MG
50 CAPSULE ORAL
Status: COMPLETED | OUTPATIENT
Start: 2024-09-16 | End: 2024-09-16

## 2024-09-16 RX ORDER — DIPHENHYDRAMINE HCL 25 MG
50 CAPSULE ORAL
Status: CANCELLED
Start: 2024-10-01

## 2024-09-16 RX ORDER — ONDANSETRON 2 MG/ML
8 INJECTION INTRAMUSCULAR; INTRAVENOUS EVERY 6 HOURS PRN
Status: DISCONTINUED | OUTPATIENT
Start: 2024-09-16 | End: 2024-09-16 | Stop reason: HOSPADM

## 2024-09-16 RX ORDER — KETOROLAC TROMETHAMINE 30 MG/ML
30 INJECTION, SOLUTION INTRAMUSCULAR; INTRAVENOUS ONCE
Status: COMPLETED | OUTPATIENT
Start: 2024-09-16 | End: 2024-09-16

## 2024-09-16 RX ORDER — ONDANSETRON 8 MG/1
8 TABLET, FILM COATED ORAL
Status: CANCELLED
Start: 2024-10-01

## 2024-09-16 RX ORDER — HEPARIN SODIUM (PORCINE) LOCK FLUSH IV SOLN 100 UNIT/ML 100 UNIT/ML
5 SOLUTION INTRAVENOUS
Status: DISCONTINUED | OUTPATIENT
Start: 2024-09-16 | End: 2024-09-16 | Stop reason: HOSPADM

## 2024-09-16 RX ORDER — HEPARIN SODIUM,PORCINE 10 UNIT/ML
5 VIAL (ML) INTRAVENOUS
Status: CANCELLED | OUTPATIENT
Start: 2024-10-01

## 2024-09-16 RX ORDER — ONDANSETRON 2 MG/ML
8 INJECTION INTRAMUSCULAR; INTRAVENOUS EVERY 6 HOURS PRN
Status: CANCELLED
Start: 2024-10-01

## 2024-09-16 RX ADMIN — ONDANSETRON 8 MG: 2 INJECTION INTRAMUSCULAR; INTRAVENOUS at 12:13

## 2024-09-16 RX ADMIN — HYDROMORPHONE HYDROCHLORIDE 1 MG: 1 INJECTION, SOLUTION INTRAMUSCULAR; INTRAVENOUS; SUBCUTANEOUS at 12:08

## 2024-09-16 RX ADMIN — HYDROMORPHONE HYDROCHLORIDE 1 MG: 1 INJECTION, SOLUTION INTRAMUSCULAR; INTRAVENOUS; SUBCUTANEOUS at 13:15

## 2024-09-16 RX ADMIN — SODIUM CHLORIDE, POTASSIUM CHLORIDE, SODIUM LACTATE AND CALCIUM CHLORIDE 1000 ML: 600; 310; 30; 20 INJECTION, SOLUTION INTRAVENOUS at 12:07

## 2024-09-16 RX ADMIN — HYDROMORPHONE HYDROCHLORIDE 1 MG: 1 INJECTION, SOLUTION INTRAMUSCULAR; INTRAVENOUS; SUBCUTANEOUS at 14:09

## 2024-09-16 RX ADMIN — KETOROLAC TROMETHAMINE 30 MG: 30 INJECTION, SOLUTION INTRAMUSCULAR at 12:08

## 2024-09-16 RX ADMIN — DIPHENHYDRAMINE HYDROCHLORIDE 50 MG: 25 CAPSULE ORAL at 12:04

## 2024-09-16 RX ADMIN — HEPARIN 5 ML: 100 SYRINGE at 14:36

## 2024-09-16 ASSESSMENT — PAIN SCALES - GENERAL
PAINLEVEL: WORST PAIN (10)
PAINLEVEL: MILD PAIN (3)

## 2024-09-16 NOTE — PROGRESS NOTES
Social Work - Transportation  [facility]    Data/Intervention:  Patient Name: Jennifer Cervantes Goes By: Jennifer    /Age: 2000 (24 year old)    Referral From: [facility]  Reason for Referral:  support requested for patient transportation needs for today's appointment.  Assessment:  called [employer] to arrange ride through patient's insurance. [employer] arranged  for patient from home with [employer]. Patient will need to call [phone] when ready for return ride home.  Plan: Patient is aware of the transportation plan.  available to assist with any other needs.    CARLOS Chavez,LGUDAY  Hematology/Oncology Social Worker  Phone:[phone] Pager: [phone]

## 2024-09-16 NOTE — PROGRESS NOTES
Infusion Nursing Note:  Jennifer Cervantes presents today for IV fluids and pain medications.    Patient seen by provider today: No   present during visit today: Not Applicable.    Note: Patient states she continues to have low back pain even after visiting ED last night for sickle cell crisis.    Patient confirms she has a medical cab ride home.      Intravenous Access:  Implanted Port.    Treatment Conditions:  Not Applicable.      Post Infusion Assessment:  Patient tolerated infusion without incident.  Patient observed for 15 minutes post IV pain medication per protocol.  Blood return noted pre and post infusion.  Site patent and intact, free from redness, edema or discomfort.  No evidence of extravasations.  Access discontinued per protocol.       Discharge Plan:   Discharge instructions reviewed with: Patient.  Patient and/or family verbalized understanding of discharge instructions and all questions answered.  Patient discharged in stable condition accompanied by: self.  Departure Mode: Ambulatory.      Aissatou Mims RN

## 2024-09-16 NOTE — TELEPHONE ENCOUNTER
Pt calling to report she would like to stay on the wait list for [facility] infusion in case something opens up before 12pm. Pt reports she does not want to be taken off the schedule for [place] infusion unless she is able to get an appt at [facility].    Message sent to infusion charge regarding pt request. Writer informed by infusion charge nurse that patients cannot stay on wait list once they are scheduled.     Call to pt to notify her of response. Asked pt if she would like to be taken off of the schedule for [place] and wait for opening at [facility]. Pt stating she would like to keep current appt.

## 2024-09-16 NOTE — TELEPHONE ENCOUNTER
Oncology Nurse Triage - Sickle Cell Pain Crisis:    Situation: Jennifer  calling about Sickle Cell Pain Crisis, requesting to be added on for IV fluids and pain medicine    Background:     Patient's last infusion was 9/13/24 Patient in ER on 9/15/24   Last clinic visit date:8/15/24 Farhan Mantilla   Does patient have active treatment plan?  Yes      Assessment of Symptoms:  Onset/Duration of symptoms: 1 day    Is it typical sickle cell pain? Yes   Location: Back   Character: Sharp           Intensity: 10/10    Any radiation of pain, numbness, tingling, weakness, warmth, swelling, discoloration of arms or legs?No     Fever?No  (if yes max temperature recorded in last 24 hours):      Chest Pain Present: No     Shortness of breath: No     Other home therapies tried: HEAT/HEATING PAD and WARM BATH     Last home medication taken and when: ibuprofen     Any Refills Needed?: No     Does patient have transportation & length of time to get to clinic: Yes if early opening she will already be at the [facility] if early opening         Recommendations:     Contacted Farhan Mantilla to see if OK to add to list     Approved by farhan Mantilla to come in for IVF/PM     If you do not hear from the infusion center by 2pm then you will not be able to get in for an infusion today. If symptoms worsen while waiting for call back, and/or you experience fever, chills, SOB, chest pain, cough, n/v, dizziness, numbness, swelling, discoloration of extremities, then seek emergency evaluation in Emergency Department.     Please note, if you are late for your appt, you risk losing your infusion appt as it may delay another patient's infusion who arrived on time.

## 2024-09-16 NOTE — TELEPHONE ENCOUNTER
[facility] has noon appt.    Jennifer has been to [facility] before and she says she will go there for a noon infusion.     Message sent to social workers to help arrange transportation.

## 2024-09-18 ENCOUNTER — NURSE TRIAGE (OUTPATIENT)
Dept: ONCOLOGY | Facility: CLINIC | Age: 24
End: 2024-09-18

## 2024-09-18 ENCOUNTER — OFFICE VISIT (OUTPATIENT)
Dept: PULMONOLOGY | Facility: CLINIC | Age: 24
End: 2024-09-18
Attending: STUDENT IN AN ORGANIZED HEALTH CARE EDUCATION/TRAINING PROGRAM
Payer: COMMERCIAL

## 2024-09-18 ENCOUNTER — INFUSION THERAPY VISIT (OUTPATIENT)
Dept: TRANSPLANT | Facility: CLINIC | Age: 24
End: 2024-09-18
Attending: PEDIATRICS
Payer: COMMERCIAL

## 2024-09-18 ENCOUNTER — ANCILLARY PROCEDURE (OUTPATIENT)
Dept: GENERAL RADIOLOGY | Facility: CLINIC | Age: 24
End: 2024-09-18
Attending: STUDENT IN AN ORGANIZED HEALTH CARE EDUCATION/TRAINING PROGRAM
Payer: COMMERCIAL

## 2024-09-18 ENCOUNTER — TELEPHONE (OUTPATIENT)
Dept: ONCOLOGY | Facility: CLINIC | Age: 24
End: 2024-09-18

## 2024-09-18 VITALS
DIASTOLIC BLOOD PRESSURE: 79 MMHG | SYSTOLIC BLOOD PRESSURE: 115 MMHG | HEART RATE: 97 BPM | RESPIRATION RATE: 16 BRPM | TEMPERATURE: 98.4 F | OXYGEN SATURATION: 92 %

## 2024-09-18 DIAGNOSIS — J45.40 MODERATE PERSISTENT ASTHMA, UNSPECIFIED WHETHER COMPLICATED: ICD-10-CM

## 2024-09-18 DIAGNOSIS — D57.00 SICKLE CELL PAIN CRISIS (H): Primary | ICD-10-CM

## 2024-09-18 DIAGNOSIS — G81.10 SPASTIC HEMIPLEGIA, UNSPECIFIED ETIOLOGY, UNSPECIFIED LATERALITY (H): ICD-10-CM

## 2024-09-18 LAB
BASOPHILS # BLD MANUAL: 0.4 10E3/UL (ref 0–0.2)
BASOPHILS NFR BLD MANUAL: 3 %
EOSINOPHIL # BLD MANUAL: 0.4 10E3/UL (ref 0–0.7)
EOSINOPHIL NFR BLD MANUAL: 3 %
ERYTHROCYTE [DISTWIDTH] IN BLOOD BY AUTOMATED COUNT: 27.3 % (ref 10–15)
HCT VFR BLD AUTO: 19.1 % (ref 35–47)
HGB BLD-MCNC: 6.7 G/DL (ref 11.7–15.7)
LYMPHOCYTES # BLD MANUAL: 3.4 10E3/UL (ref 0.8–5.3)
LYMPHOCYTES NFR BLD MANUAL: 27 %
MCH RBC QN AUTO: 31 PG (ref 26.5–33)
MCHC RBC AUTO-ENTMCNC: 35.1 G/DL (ref 31.5–36.5)
MCV RBC AUTO: 88 FL (ref 78–100)
MONOCYTES # BLD MANUAL: 0.4 10E3/UL (ref 0–1.3)
MONOCYTES NFR BLD MANUAL: 3 %
NEUTROPHILS # BLD MANUAL: 8.1 10E3/UL (ref 1.6–8.3)
NEUTROPHILS NFR BLD MANUAL: 64 %
NRBC # BLD AUTO: 0.9 10E3/UL
NRBC # BLD AUTO: 2.3 10E3/UL
NRBC BLD AUTO-RTO: 7 /100
NRBC BLD MANUAL-RTO: 18 %
PLAT MORPH BLD: ABNORMAL
PLATELET # BLD AUTO: 378 10E3/UL (ref 150–450)
POLYCHROMASIA BLD QL SMEAR: ABNORMAL
PULMONARY FUNCTION TEST-FENO: 30.5 PPB (ref 0–40)
RBC # BLD AUTO: 2.16 10E6/UL (ref 3.8–5.2)
RBC MORPH BLD: ABNORMAL
SICKLE CELLS BLD QL SMEAR: ABNORMAL
TARGETS BLD QL SMEAR: SLIGHT
WBC # BLD AUTO: 12.6 10E3/UL (ref 4–11)

## 2024-09-18 PROCEDURE — 85027 COMPLETE CBC AUTOMATED: CPT | Performed by: REGISTERED NURSE

## 2024-09-18 PROCEDURE — 96376 TX/PRO/DX INJ SAME DRUG ADON: CPT

## 2024-09-18 PROCEDURE — 96361 HYDRATE IV INFUSION ADD-ON: CPT

## 2024-09-18 PROCEDURE — 85007 BL SMEAR W/DIFF WBC COUNT: CPT | Performed by: REGISTERED NURSE

## 2024-09-18 PROCEDURE — 36591 DRAW BLOOD OFF VENOUS DEVICE: CPT | Performed by: REGISTERED NURSE

## 2024-09-18 PROCEDURE — 96374 THER/PROPH/DIAG INJ IV PUSH: CPT

## 2024-09-18 PROCEDURE — 250N000011 HC RX IP 250 OP 636: Performed by: PEDIATRICS

## 2024-09-18 PROCEDURE — 94729 DIFFUSING CAPACITY: CPT | Performed by: INTERNAL MEDICINE

## 2024-09-18 PROCEDURE — 94726 PLETHYSMOGRAPHY LUNG VOLUMES: CPT | Performed by: INTERNAL MEDICINE

## 2024-09-18 PROCEDURE — 258N000003 HC RX IP 258 OP 636: Performed by: PEDIATRICS

## 2024-09-18 PROCEDURE — 94150 VITAL CAPACITY TEST: CPT | Performed by: INTERNAL MEDICINE

## 2024-09-18 PROCEDURE — 94375 RESPIRATORY FLOW VOLUME LOOP: CPT | Performed by: INTERNAL MEDICINE

## 2024-09-18 PROCEDURE — 96375 TX/PRO/DX INJ NEW DRUG ADDON: CPT

## 2024-09-18 PROCEDURE — 250N000013 HC RX MED GY IP 250 OP 250 PS 637: Performed by: PEDIATRICS

## 2024-09-18 PROCEDURE — 71046 X-RAY EXAM CHEST 2 VIEWS: CPT | Mod: GC | Performed by: STUDENT IN AN ORGANIZED HEALTH CARE EDUCATION/TRAINING PROGRAM

## 2024-09-18 RX ORDER — ONDANSETRON 2 MG/ML
8 INJECTION INTRAMUSCULAR; INTRAVENOUS EVERY 6 HOURS PRN
Status: CANCELLED
Start: 2024-10-01

## 2024-09-18 RX ORDER — HEPARIN SODIUM (PORCINE) LOCK FLUSH IV SOLN 100 UNIT/ML 100 UNIT/ML
5 SOLUTION INTRAVENOUS
Status: CANCELLED | OUTPATIENT
Start: 2024-10-01

## 2024-09-18 RX ORDER — DIPHENHYDRAMINE HCL 25 MG
50 CAPSULE ORAL
Status: CANCELLED
Start: 2024-10-01

## 2024-09-18 RX ORDER — DIPHENHYDRAMINE HCL 25 MG
50 CAPSULE ORAL
Status: COMPLETED | OUTPATIENT
Start: 2024-09-18 | End: 2024-09-18

## 2024-09-18 RX ORDER — ONDANSETRON 2 MG/ML
8 INJECTION INTRAMUSCULAR; INTRAVENOUS EVERY 6 HOURS PRN
Status: DISCONTINUED | OUTPATIENT
Start: 2024-09-18 | End: 2024-09-18 | Stop reason: HOSPADM

## 2024-09-18 RX ORDER — KETOROLAC TROMETHAMINE 30 MG/ML
30 INJECTION, SOLUTION INTRAMUSCULAR; INTRAVENOUS ONCE
Status: COMPLETED | OUTPATIENT
Start: 2024-09-18 | End: 2024-09-18

## 2024-09-18 RX ORDER — HEPARIN SODIUM,PORCINE 10 UNIT/ML
5 VIAL (ML) INTRAVENOUS
Status: CANCELLED | OUTPATIENT
Start: 2024-10-01

## 2024-09-18 RX ORDER — ONDANSETRON 4 MG/1
8 TABLET, FILM COATED ORAL
Status: CANCELLED
Start: 2024-10-01

## 2024-09-18 RX ORDER — KETOROLAC TROMETHAMINE 30 MG/ML
30 INJECTION, SOLUTION INTRAMUSCULAR; INTRAVENOUS ONCE
Status: CANCELLED
Start: 2024-10-01 | End: 2024-10-01

## 2024-09-18 RX ADMIN — HYDROMORPHONE HYDROCHLORIDE 1 MG: 1 INJECTION, SOLUTION INTRAMUSCULAR; INTRAVENOUS; SUBCUTANEOUS at 15:13

## 2024-09-18 RX ADMIN — KETOROLAC TROMETHAMINE 30 MG: 30 INJECTION, SOLUTION INTRAMUSCULAR; INTRAVENOUS at 15:19

## 2024-09-18 RX ADMIN — ONDANSETRON 8 MG: 2 INJECTION INTRAMUSCULAR; INTRAVENOUS at 15:15

## 2024-09-18 RX ADMIN — HYDROMORPHONE HYDROCHLORIDE 1 MG: 1 INJECTION, SOLUTION INTRAMUSCULAR; INTRAVENOUS; SUBCUTANEOUS at 17:08

## 2024-09-18 RX ADMIN — SODIUM CHLORIDE, POTASSIUM CHLORIDE, SODIUM LACTATE AND CALCIUM CHLORIDE 1000 ML: 600; 310; 30; 20 INJECTION, SOLUTION INTRAVENOUS at 15:13

## 2024-09-18 RX ADMIN — DIPHENHYDRAMINE HYDROCHLORIDE 50 MG: 25 CAPSULE ORAL at 15:15

## 2024-09-18 RX ADMIN — HYDROMORPHONE HYDROCHLORIDE 1 MG: 1 INJECTION, SOLUTION INTRAMUSCULAR; INTRAVENOUS; SUBCUTANEOUS at 16:12

## 2024-09-18 ASSESSMENT — PAIN SCALES - GENERAL: PAINLEVEL: WORST PAIN (10)

## 2024-09-18 NOTE — TELEPHONE ENCOUNTER
Called about Hgb being 6.7 for pt. Attempted to call patient 3x without response. Given labs are near her baseline (And after discussion with my attending), will send a message to her primary hematology team to discuss further recommendations tomorrow.     Luis Negro DO   Hematology/Oncology/BMT Fellow PGY5  Pager: [phone]

## 2024-09-18 NOTE — TELEPHONE ENCOUNTER
Oncology Nurse Triage - Sickle Cell Pain Crisis:    Situation: Jennifer  calling about Sickle Cell Pain Crisis, requesting to be added on for IV fluids and pain medicine    Background:     Patient's last infusion was 9/16/2024  Last clinic visit date:8/15/2024 Patricia Mantilla CNP  Does patient have active treatment plan?  Yes      Assessment of Symptoms:  Onset/Duration of symptoms: 1 day    Is it typical sickle cell pain? Yes   Location: all over  Character: Dull ache           Intensity: 8/10    Any radiation of pain, numbness, tingling, weakness, warmth, swelling, discoloration of arms or legs?No     Fever?No    Chest Pain Present: No     Shortness of breath: No     Other home therapies tried: HEAT/HEATING PAD and WARM BATH     Last home medication taken and when: this morning around 6:00am Oxycodone and IBUprofen    Any Refills Needed?: No     Does patient have transportation & length of time to get to clinic: Yes   If pt gets an early appointment can get to clinic.       Recommendations:   Patient has appt today at [address] [place] [address]. Meets protocol     If you do not hear from the infusion center by 2pm then you will not be able to get in for an infusion today. If symptoms worsen while waiting for call back, and/or you experience fever, chills, SOB, chest pain, cough, n/v, dizziness, numbness, swelling, discoloration of extremities, then seek emergency evaluation in Emergency Department.     Please note, if you are late for your appt, you risk losing your infusion appt as it may delay another patient's infusion who arrived on time.

## 2024-09-18 NOTE — TELEPHONE ENCOUNTER
At [facility] right now.  All her apts are finished at 2 p.m.  She is checking to see if there are any openings.  Message sent to Infusion Teams and currently [facility] has no openings.  Communicated this to pt.  Informed pt that she will be called back if we have any openings.   Pt states she will be around until around 3:30 and then she will go home if she doesn't hear back from us.

## 2024-09-18 NOTE — TELEPHONE ENCOUNTER
1229 Appointment available with BMT after patient's other appointments today, patient to check in after other appointments. Left message for Jennifer.     4901 Jennifer called back, in agreement with plan. Will check in as soon as done. For scheduling purposes appt will say 3:00pm but pt is aware to check in as soon as done with other appts in the building.

## 2024-09-18 NOTE — PROGRESS NOTES
Infusion Nursing Note:  Jennifer Cervantes presents today for add on IVF and pain meds.    Patient seen by provider today: No   present during visit today: Not Applicable.    Note: Patient presents tearful, c/o 10/10 pain; reports being in the ED twice last week and labs show her Hgb dropped to 6.5. Patricia Mantilla, ANDREAS notified, requested CBC be checked again today. Port accessed, labs drawn. LR 1L bolus given over 2 hours. Patient received 50mg PO Benadryl, 8mg IVP Zofran, 30mg IVP Toradol, and 1mg IVP Dilaudid Q1HR for max of 3 doses. Patient reports pain improvement to 5/10. Infusion completed without complication.    Intravenous Access:  Implanted Port.  +BR noted pre and post infusion  De-accessed prior to discharge    Treatment Conditions:  Not Applicable.    Post Infusion Assessment:  Patient tolerated infusion without incident.     Discharge Plan:   Patient discharged in stable condition accompanied by: self.  Departure Mode: Ambulatory.    Allison Wiggins RN

## 2024-09-18 NOTE — TELEPHONE ENCOUNTER
Jennifer called tearful, checking on status.    This writer sent request to infusion centers.    As of 0928 there is no infusion appts.     Informed Jennifer if cannot wait and pain unbearable to go to Emergency Department.

## 2024-09-19 ENCOUNTER — PATIENT OUTREACH (OUTPATIENT)
Dept: ONCOLOGY | Facility: CLINIC | Age: 24
End: 2024-09-19
Payer: COMMERCIAL

## 2024-09-19 LAB
DLCOCOR-%PRED-PRE: 111 %
DLCOCOR-PRE: 24.73 ML/MIN/MMHG
DLCOUNC-%PRED-PRE: 77 %
DLCOUNC-PRE: 17.21 ML/MIN/MMHG
DLCOUNC-PRED: 22.14 ML/MIN/MMHG
ERV-%PRED-PRE: 48 %
ERV-PRE: 0.61 L
ERV-PRED: 1.25 L
EXPTIME-PRE: 5.18 SEC
FEF2575-%PRED-PRE: 52 %
FEF2575-PRE: 1.86 L/SEC
FEF2575-PRED: 3.57 L/SEC
FEFMAX-%PRED-PRE: 76 %
FEFMAX-PRE: 5.35 L/SEC
FEFMAX-PRED: 6.95 L/SEC
FEV1-%PRED-PRE: 62 %
FEV1-PRE: 1.89 L
FEV1FEV6-PRE: 83 %
FEV1FEV6-PRED: 86 %
FEV1FVC-PRE: 83 %
FEV1FVC-PRED: 87 %
FEV1SVC-PRE: 84 %
FEV1SVC-PRED: 82 %
FIFMAX-PRE: 3.1 L/SEC
FRCPLETH-%PRED-PRE: 78 %
FRCPLETH-PRE: 2.08 L
FRCPLETH-PRED: 2.67 L
FVC-%PRED-PRE: 64 %
FVC-PRE: 2.27 L
FVC-PRED: 3.51 L
IC-%PRED-PRE: 66 %
IC-PRE: 1.65 L
IC-PRED: 2.5 L
RVPLETH-%PRED-PRE: 108 %
RVPLETH-PRE: 1.47 L
RVPLETH-PRED: 1.35 L
TLCPLETH-%PRED-PRE: 75 %
TLCPLETH-PRE: 3.73 L
TLCPLETH-PRED: 4.94 L
VA-%PRED-PRE: 66 %
VA-PRE: 3.18 L
VC-%PRED-PRE: 60 %
VC-PRE: 2.26 L
VC-PRED: 3.72 L

## 2024-09-19 NOTE — PROGRESS NOTES
[facility]                                                                                          Attempt x 2 to reach pt to check in.  Left message with clinic phone number and instructions to call back if she is not feeling well today.  Will await pt call back.     Signature:  Arely Krueger RN

## 2024-09-20 ENCOUNTER — HOSPITAL ENCOUNTER (EMERGENCY)
Facility: CLINIC | Age: 24
Discharge: HOME | End: 2024-09-20
Attending: FAMILY MEDICINE | Admitting: FAMILY MEDICINE
Payer: COMMERCIAL

## 2024-09-20 ENCOUNTER — NURSE TRIAGE (OUTPATIENT)
Dept: ONCOLOGY | Facility: CLINIC | Age: 24
End: 2024-09-20
Payer: COMMERCIAL

## 2024-09-20 VITALS
TEMPERATURE: 98.1 F | DIASTOLIC BLOOD PRESSURE: 87 MMHG | HEART RATE: 86 BPM | SYSTOLIC BLOOD PRESSURE: 140 MMHG | RESPIRATION RATE: 16 BRPM | OXYGEN SATURATION: 94 %

## 2024-09-20 DIAGNOSIS — G89.29 CHRONIC ABDOMINAL PAIN: ICD-10-CM

## 2024-09-20 DIAGNOSIS — R10.9 CHRONIC ABDOMINAL PAIN: ICD-10-CM

## 2024-09-20 DIAGNOSIS — D57.00 SICKLE CELL DISEASE WITH CRISIS (H): ICD-10-CM

## 2024-09-20 LAB
ALBUMIN SERPL BCG-MCNC: 4.8 G/DL (ref 3.5–5.2)
ALBUMIN UR-MCNC: NEGATIVE MG/DL
ALP SERPL-CCNC: 59 U/L (ref 40–150)
ALT SERPL W P-5'-P-CCNC: 31 U/L (ref 0–50)
ANION GAP SERPL CALCULATED.3IONS-SCNC: 11 MMOL/L (ref 7–15)
APPEARANCE UR: CLEAR
AST SERPL W P-5'-P-CCNC: 50 U/L (ref 0–45)
BACTERIA #/AREA URNS HPF: ABNORMAL /HPF
BASOPHILS # BLD AUTO: 0.2 10E3/UL (ref 0–0.2)
BASOPHILS NFR BLD AUTO: 1 %
BILIRUB SERPL-MCNC: 4.1 MG/DL
BILIRUB UR QL STRIP: NEGATIVE
BUN SERPL-MCNC: 7.3 MG/DL (ref 6–20)
CALCIUM SERPL-MCNC: 9.2 MG/DL (ref 8.8–10.4)
CHLORIDE SERPL-SCNC: 106 MMOL/L (ref 98–107)
COLOR UR AUTO: YELLOW
CREAT SERPL-MCNC: 0.54 MG/DL (ref 0.51–0.95)
EGFRCR SERPLBLD CKD-EPI 2021: >90 ML/MIN/1.73M2
EOSINOPHIL # BLD AUTO: 0.4 10E3/UL (ref 0–0.7)
EOSINOPHIL NFR BLD AUTO: 4 %
ERYTHROCYTE [DISTWIDTH] IN BLOOD BY AUTOMATED COUNT: 27.3 % (ref 10–15)
GLUCOSE SERPL-MCNC: 86 MG/DL (ref 70–99)
GLUCOSE UR STRIP-MCNC: NEGATIVE MG/DL
HCG UR QL: NEGATIVE
HCO3 SERPL-SCNC: 21 MMOL/L (ref 22–29)
HCT VFR BLD AUTO: 21.2 % (ref 35–47)
HGB BLD-MCNC: 7.6 G/DL (ref 11.7–15.7)
HGB UR QL STRIP: ABNORMAL
IMM GRANULOCYTES # BLD: 0.1 10E3/UL
IMM GRANULOCYTES NFR BLD: 1 %
KETONES UR STRIP-MCNC: NEGATIVE MG/DL
LEUKOCYTE ESTERASE UR QL STRIP: NEGATIVE
LYMPHOCYTES # BLD AUTO: 1.4 10E3/UL (ref 0.8–5.3)
LYMPHOCYTES NFR BLD AUTO: 12 %
MCH RBC QN AUTO: 31.9 PG (ref 26.5–33)
MCHC RBC AUTO-ENTMCNC: 35.8 G/DL (ref 31.5–36.5)
MCV RBC AUTO: 89 FL (ref 78–100)
MONOCYTES # BLD AUTO: 0.8 10E3/UL (ref 0–1.3)
MONOCYTES NFR BLD AUTO: 7 %
MUCOUS THREADS #/AREA URNS LPF: PRESENT /LPF
NEUTROPHILS # BLD AUTO: 8.9 10E3/UL (ref 1.6–8.3)
NEUTROPHILS NFR BLD AUTO: 75 %
NITRATE UR QL: NEGATIVE
NRBC # BLD AUTO: 1 10E3/UL
NRBC BLD AUTO-RTO: 9 /100
PH UR STRIP: 6 [PH] (ref 5–7)
PLATELET # BLD AUTO: 455 10E3/UL (ref 150–450)
POTASSIUM SERPL-SCNC: 3.9 MMOL/L (ref 3.4–5.3)
PROT SERPL-MCNC: 7.8 G/DL (ref 6.4–8.3)
RBC # BLD AUTO: 2.38 10E6/UL (ref 3.8–5.2)
RBC URINE: 2 /HPF
RETICS # AUTO: 0.7 10E6/UL (ref 0.03–0.1)
RETICS/RBC NFR AUTO: 30.3 % (ref 0.5–2)
SODIUM SERPL-SCNC: 138 MMOL/L (ref 135–145)
SP GR UR STRIP: 1.01 (ref 1–1.03)
SQUAMOUS EPITHELIAL: 4 /HPF
UROBILINOGEN UR STRIP-MCNC: 4 MG/DL
WBC # BLD AUTO: 11.7 10E3/UL (ref 4–11)
WBC URINE: 5 /HPF

## 2024-09-20 PROCEDURE — 250N000011 HC RX IP 250 OP 636: Performed by: FAMILY MEDICINE

## 2024-09-20 PROCEDURE — 81001 URINALYSIS AUTO W/SCOPE: CPT | Performed by: FAMILY MEDICINE

## 2024-09-20 PROCEDURE — 96375 TX/PRO/DX INJ NEW DRUG ADDON: CPT | Performed by: FAMILY MEDICINE

## 2024-09-20 PROCEDURE — 36415 COLL VENOUS BLD VENIPUNCTURE: CPT | Performed by: FAMILY MEDICINE

## 2024-09-20 PROCEDURE — 96361 HYDRATE IV INFUSION ADD-ON: CPT | Performed by: FAMILY MEDICINE

## 2024-09-20 PROCEDURE — 81025 URINE PREGNANCY TEST: CPT | Performed by: FAMILY MEDICINE

## 2024-09-20 PROCEDURE — 80053 COMPREHEN METABOLIC PANEL: CPT | Performed by: FAMILY MEDICINE

## 2024-09-20 PROCEDURE — 99284 EMERGENCY DEPT VISIT MOD MDM: CPT | Mod: 25 | Performed by: FAMILY MEDICINE

## 2024-09-20 PROCEDURE — 96376 TX/PRO/DX INJ SAME DRUG ADON: CPT | Performed by: FAMILY MEDICINE

## 2024-09-20 PROCEDURE — 99284 EMERGENCY DEPT VISIT MOD MDM: CPT | Performed by: FAMILY MEDICINE

## 2024-09-20 PROCEDURE — 85045 AUTOMATED RETICULOCYTE COUNT: CPT | Performed by: FAMILY MEDICINE

## 2024-09-20 PROCEDURE — 258N000003 HC RX IP 258 OP 636: Performed by: FAMILY MEDICINE

## 2024-09-20 PROCEDURE — 96374 THER/PROPH/DIAG INJ IV PUSH: CPT | Performed by: FAMILY MEDICINE

## 2024-09-20 PROCEDURE — 85025 COMPLETE CBC W/AUTO DIFF WBC: CPT | Performed by: FAMILY MEDICINE

## 2024-09-20 RX ORDER — HEPARIN SODIUM,PORCINE 10 UNIT/ML
5-10 VIAL (ML) INTRAVENOUS
Status: DISCONTINUED | OUTPATIENT
Start: 2024-09-20 | End: 2024-09-20 | Stop reason: HOSPADM

## 2024-09-20 RX ORDER — KETOROLAC TROMETHAMINE 30 MG/ML
30 INJECTION, SOLUTION INTRAMUSCULAR; INTRAVENOUS ONCE
Status: COMPLETED | OUTPATIENT
Start: 2024-09-20 | End: 2024-09-20

## 2024-09-20 RX ORDER — HEPARIN SODIUM (PORCINE) LOCK FLUSH IV SOLN 100 UNIT/ML 100 UNIT/ML
5-10 SOLUTION INTRAVENOUS
Status: DISCONTINUED | OUTPATIENT
Start: 2024-09-20 | End: 2024-09-20 | Stop reason: HOSPADM

## 2024-09-20 RX ORDER — HEPARIN SODIUM (PORCINE) LOCK FLUSH IV SOLN 100 UNIT/ML 100 UNIT/ML
100 SOLUTION INTRAVENOUS ONCE
Status: DISCONTINUED | OUTPATIENT
Start: 2024-09-20 | End: 2024-09-20

## 2024-09-20 RX ORDER — HEPARIN SODIUM,PORCINE 10 UNIT/ML
5-10 VIAL (ML) INTRAVENOUS EVERY 24 HOURS
Status: DISCONTINUED | OUTPATIENT
Start: 2024-09-20 | End: 2024-09-20 | Stop reason: HOSPADM

## 2024-09-20 RX ORDER — ONDANSETRON 2 MG/ML
8 INJECTION INTRAMUSCULAR; INTRAVENOUS ONCE
Status: COMPLETED | OUTPATIENT
Start: 2024-09-20 | End: 2024-09-20

## 2024-09-20 RX ADMIN — ONDANSETRON 8 MG: 2 INJECTION INTRAMUSCULAR; INTRAVENOUS at 15:54

## 2024-09-20 RX ADMIN — HYDROMORPHONE HYDROCHLORIDE 1 MG: 1 INJECTION, SOLUTION INTRAMUSCULAR; INTRAVENOUS; SUBCUTANEOUS at 18:27

## 2024-09-20 RX ADMIN — SODIUM CHLORIDE, POTASSIUM CHLORIDE, SODIUM LACTATE AND CALCIUM CHLORIDE 1000 ML: 600; 310; 30; 20 INJECTION, SOLUTION INTRAVENOUS at 15:53

## 2024-09-20 RX ADMIN — HYDROMORPHONE HYDROCHLORIDE 1 MG: 1 INJECTION, SOLUTION INTRAMUSCULAR; INTRAVENOUS; SUBCUTANEOUS at 17:08

## 2024-09-20 RX ADMIN — KETOROLAC TROMETHAMINE 30 MG: 30 INJECTION, SOLUTION INTRAMUSCULAR at 15:54

## 2024-09-20 RX ADMIN — HEPARIN 5 ML: 100 SYRINGE at 19:29

## 2024-09-20 RX ADMIN — HYDROMORPHONE HYDROCHLORIDE 1 MG: 1 INJECTION, SOLUTION INTRAMUSCULAR; INTRAVENOUS; SUBCUTANEOUS at 15:54

## 2024-09-20 ASSESSMENT — ACTIVITIES OF DAILY LIVING (ADL)
ADLS_ACUITY_SCORE: 37
ADLS_ACUITY_SCORE: 37
ADLS_ACUITY_SCORE: 35
ADLS_ACUITY_SCORE: 37
ADLS_ACUITY_SCORE: 37

## 2024-09-20 NOTE — ED PROVIDER NOTES
ED Provider Note  [facility]      History     Chief Complaint   Patient presents with    Abdominal Pain     HPI  Jennifer Cervantes is a 24 year old female with a history of sickle cell disease, CVA with spastic hemiplegia, cognitive delay, asthma, prior cerebral infarction, with the ER care plan who presents to the emergency department for stomach pain for months, trouble sleeping, and intermittent nausea.    Past Medical History  Past Medical History:   Diagnosis Date    Anxiety     Bleeding disorder (H24)     Blood clotting disorder (H24)     Cerebral infarction (H) 2015    Cognitive developmental delay     low IQ. 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 side (H)     right hand contractures    Iron overload due to repeated red blood cell transfusions     Migraines     Multiple subsegmental pulmonary emboli without acute cor pulmonale (H) 02/01/2021    Oppositional defiant behavior     Presence of intrauterine contraceptive device 2/18/2020    Superficial venous thrombosis of arm, right 03/25/2021    Uncomplicated asthma      Past Surgical History:   Procedure Laterality Date    AS INSERT TUNNELED CV 2 CATH W/O PORT/PUMP      CHOLECYSTECTOMY      CV RIGHT HEART CATH MEASUREMENTS RECORDED N/A 11/18/2021    Procedure: Right Heart Cath;  Surgeon: Jackson Stauffer MD;  Location:  HEART CARDIAC CATH LAB    INSERT PORT VASCULAR ACCESS Left 4/21/2021    Procedure: INSERTION, VASCULAR ACCESS PORT (NOT SURE ON SIDE UNTIL REMOVAL);  Surgeon: Rajan More MD;  Location: [facility] OR    IR CHEST PORT PLACEMENT > 5 YRS OF AGE  4/21/2021    IR CVC NON TUNNEL LINE REMOVAL  5/6/2021    IR CVC NON TUNNEL PLACEMENT > 5 YRS  04/07/2020    IR CVC NON TUNNEL PLACEMENT > 5 YRS  4/30/2021    IR CVC NON TUNNEL PLACEMENT > 5 YRS  9/7/2022    IR PORT REMOVAL LEFT  4/21/2021    REMOVE PORT  VASCULAR ACCESS Left 4/21/2021    Procedure: REMOVAL, VASCULAR ACCESS PORT LEFT;  Surgeon: Rajan More MD;  Location: [facility] OR    REPAIR TENDON ELBOW Right 10/02/2019    Procedure: Right Elbow Flexor Lengthening, Flexor Pronator Slide Of Wrist and Finger, Thumb Adductor Lengthening;  Surgeon: Anai Franco MD;  Location: [facility] OR    TONSILLECTOMY Bilateral 10/02/2019    Procedure: Bilateral Tonsillectomy;  Surgeon: Farhana Guy MD;  Location: [facility] OR    ZZC BREAST REDUCTION (INCLUDES LIPO) TIER 3 Bilateral 04/23/2019     acetaminophen (TYLENOL) 325 MG tablet  albuterol (PROAIR HFA/PROVENTIL HFA/VENTOLIN HFA) 108 (90 Base) MCG/ACT inhaler  albuterol (PROVENTIL) (2.5 MG/3ML) 0.083% neb solution  aspirin (ASA) 81 MG chewable tablet  budesonide-formoterol (SYMBICORT) 160-4.5 MCG/ACT Inhaler  deferasirox (JADENU) 360 MG tablet  EPINEPHrine (ANY BX GENERIC EQUIV) 0.3 MG/0.3ML injection 2-pack  gabapentin (NEURONTIN) 300 MG capsule  hydroxyurea (HYDREA) 500 MG capsule  ibuprofen (ADVIL/MOTRIN) 600 MG tablet  ibuprofen (ADVIL/MOTRIN) 800 MG tablet  melatonin 5 MG tablet  methocarbamol (ROBAXIN) 750 MG tablet  naloxone (NARCAN) 4 MG/0.1ML nasal spray  naloxone (NARCAN) 4 MG/0.1ML nasal spray  omeprazole (PRILOSEC) 20 MG DR capsule  ondansetron (ZOFRAN ODT) 4 MG ODT tab  ondansetron (ZOFRAN ODT) 8 MG ODT tab  oxyCODONE IR (ROXICODONE) 10 MG tablet      Allergies   Allergen Reactions    Contrast Dye Angioedema     Hives and breathing issues    Fish-Derived Products Hives    Seafood Hives    Adhesive Tape Hives     Primipore dressing causes hives    Gadolinium     Iodinated Contrast Media      Family History  Family History   Problem Relation Age of Onset    Sickle Cell Trait Mother     Hypertension Mother     Asthma Mother     Sickle Cell Trait Father     Glaucoma No family hx of     Macular Degeneration No family hx of     Diabetes No family hx of     Gout No family hx of      Social History   Social  History     Tobacco Use    Smoking status: Never     Passive exposure: Never    Smokeless tobacco: Never   Substance Use Topics    Alcohol use: Not Currently     Alcohol/week: 0.0 standard drinks of alcohol    Drug use: Never      A medically appropriate review of systems was performed with pertinent positives and negatives noted in the HPI, and all other systems negative.    Physical Exam   BP: 131/80  Pulse: 86  Temp: 98.1  F (36.7  C)  Resp: 16  SpO2: 96 %  Physical Exam  Constitutional:       General: She is not in acute distress.     Appearance: Normal appearance. She is not diaphoretic.   HENT:      Head: Atraumatic.      Mouth/Throat:      Mouth: Mucous membranes are moist.   Eyes:      General: No scleral icterus.     Conjunctiva/sclera: Conjunctivae normal.   Cardiovascular:      Rate and Rhythm: Normal rate.      Heart sounds: Normal heart sounds.   Pulmonary:      Effort: No respiratory distress.      Breath sounds: Normal breath sounds.   Abdominal:      General: Abdomen is flat.   Musculoskeletal:      Cervical back: Neck supple.   Skin:     General: Skin is warm.      Findings: No rash.   Neurological:      Mental Status: She is alert.           ED Course, Procedures, & Data      Procedures         Results for orders placed or performed during the hospital encounter of 09/20/24   Comprehensive metabolic panel     Status: Abnormal   Result Value Ref Range    Sodium 138 135 - 145 mmol/L    Potassium 3.9 3.4 - 5.3 mmol/L    Carbon Dioxide (CO2) 21 (L) 22 - 29 mmol/L    Anion Gap 11 7 - 15 mmol/L    Urea Nitrogen 7.3 6.0 - 20.0 mg/dL    Creatinine 0.54 0.51 - 0.95 mg/dL    GFR Estimate >90 >60 mL/min/1.73m2    Calcium 9.2 8.8 - 10.4 mg/dL    Chloride 106 98 - 107 mmol/L    Glucose 86 70 - 99 mg/dL    Alkaline Phosphatase 59 40 - 150 U/L    AST 50 (H) 0 - 45 U/L    ALT 31 0 - 50 U/L    Protein Total 7.8 6.4 - 8.3 g/dL    Albumin 4.8 3.5 - 5.2 g/dL    Bilirubin Total 4.1 (H) <=1.2 mg/dL   Reticulocyte count      Status: Abnormal   Result Value Ref Range    % Reticulocyte 30.3 (H) 0.5 - 2.0 %    Absolute Reticulocyte 0.703 (H) 0.025 - 0.095 10e6/uL   HCG qualitative urine (UPT)     Status: Normal   Result Value Ref Range    hCG Urine Qualitative Negative Negative   UA with Microscopic reflex to Culture     Status: Abnormal    Specimen: Urine, Midstream   Result Value Ref Range    Color Urine Yellow Colorless, Straw, Light Yellow, Yellow    Appearance Urine Clear Clear    Glucose Urine Negative Negative mg/dL    Bilirubin Urine Negative Negative    Ketones Urine Negative Negative mg/dL    Specific Gravity Urine 1.011 1.003 - 1.035    Blood Urine Large (A) Negative    pH Urine 6.0 5.0 - 7.0    Protein Albumin Urine Negative Negative mg/dL    Urobilinogen Urine 4.0 (A) Normal, 2.0 mg/dL    Nitrite Urine Negative Negative    Leukocyte Esterase Urine Negative Negative    Bacteria Urine Few (A) None Seen /HPF    Mucus Urine Present (A) None Seen /LPF    RBC Urine 2 <=2 /HPF    WBC Urine 5 <=5 /HPF    Squamous Epithelials Urine 4 (H) <=1 /HPF    Narrative    Urine Culture not indicated   CBC with platelets and differential     Status: Abnormal   Result Value Ref Range    WBC Count 11.7 (H) 4.0 - 11.0 10e3/uL    RBC Count 2.38 (L) 3.80 - 5.20 10e6/uL    Hemoglobin 7.6 (L) 11.7 - 15.7 g/dL    Hematocrit 21.2 (L) 35.0 - 47.0 %    MCV 89 78 - 100 fL    MCH 31.9 26.5 - 33.0 pg    MCHC 35.8 31.5 - 36.5 g/dL    RDW 27.3 (H) 10.0 - 15.0 %    Platelet Count 455 (H) 150 - 450 10e3/uL    % Neutrophils 75 %    % Lymphocytes 12 %    % Monocytes 7 %    % Eosinophils 4 %    % Basophils 1 %    % Immature Granulocytes 1 %    NRBCs per 100 WBC 9 (H) <1 /100    Absolute Neutrophils 8.9 (H) 1.6 - 8.3 10e3/uL    Absolute Lymphocytes 1.4 0.8 - 5.3 10e3/uL    Absolute Monocytes 0.8 0.0 - 1.3 10e3/uL    Absolute Eosinophils 0.4 0.0 - 0.7 10e3/uL    Absolute Basophils 0.2 0.0 - 0.2 10e3/uL    Absolute Immature Granulocytes 0.1 <=0.4 10e3/uL    Absolute  NRBCs 1.0 10e3/uL   CBC with platelets differential     Status: Abnormal    Narrative    The following orders were created for panel order CBC with platelets differential.  Procedure                               Abnormality         Status                     ---------                               -----------         ------                     CBC with platelets and d...[[id number]]  Abnormal            Final result               Manual Differential[[id number]]                                                           Please view results for these tests on the individual orders.     Medications   lactated ringers BOLUS 1,000 mL (0 mLs Intravenous Stopped 9/20/24 1825)   HYDROmorphone (DILAUDID) injection 1 mg (1 mg Intravenous $Given 9/20/24 1554)   ketorolac (TORADOL) injection 30 mg (30 mg Intravenous $Given 9/20/24 1554)   ondansetron (ZOFRAN) injection 8 mg (8 mg Intravenous $Given 9/20/24 1554)   HYDROmorphone (DILAUDID) injection 1 mg (1 mg Intravenous $Given 9/20/24 1708)   HYDROmorphone (DILAUDID) injection 1 mg (1 mg Intravenous $Given 9/20/24 1827)     Labs Ordered and Resulted from Time of ED Arrival to Time of ED Departure   COMPREHENSIVE METABOLIC PANEL - Abnormal       Result Value    Sodium 138      Potassium 3.9      Carbon Dioxide (CO2) 21 (*)     Anion Gap 11      Urea Nitrogen 7.3      Creatinine 0.54      GFR Estimate >90      Calcium 9.2      Chloride 106      Glucose 86      Alkaline Phosphatase 59      AST 50 (*)     ALT 31      Protein Total 7.8      Albumin 4.8      Bilirubin Total 4.1 (*)    RETICULOCYTE COUNT - Abnormal    % Reticulocyte 30.3 (*)     Absolute Reticulocyte 0.703 (*)    ROUTINE UA WITH MICROSCOPIC REFLEX TO CULTURE - Abnormal    Color Urine Yellow      Appearance Urine Clear      Glucose Urine Negative      Bilirubin Urine Negative      Ketones Urine Negative      Specific Gravity Urine 1.011      Blood Urine Large (*)     pH Urine 6.0      Protein Albumin Urine Negative       Urobilinogen Urine 4.0 (*)     Nitrite Urine Negative      Leukocyte Esterase Urine Negative      Bacteria Urine Few (*)     Mucus Urine Present (*)     RBC Urine 2      WBC Urine 5      Squamous Epithelials Urine 4 (*)    CBC WITH PLATELETS AND DIFFERENTIAL - Abnormal    WBC Count 11.7 (*)     RBC Count 2.38 (*)     Hemoglobin 7.6 (*)     Hematocrit 21.2 (*)     MCV 89      MCH 31.9      MCHC 35.8      RDW 27.3 (*)     Platelet Count 455 (*)     % Neutrophils 75      % Lymphocytes 12      % Monocytes 7      % Eosinophils 4      % Basophils 1      % Immature Granulocytes 1      NRBCs per 100 WBC 9 (*)     Absolute Neutrophils 8.9 (*)     Absolute Lymphocytes 1.4      Absolute Monocytes 0.8      Absolute Eosinophils 0.4      Absolute Basophils 0.2      Absolute Immature Granulocytes 0.1      Absolute NRBCs 1.0     HCG QUALITATIVE URINE - Normal    hCG Urine Qualitative Negative       No orders to display          Critical care was not performed.     Medical Decision Making  The patient's presentation was of moderate complexity (a chronic illness mild to moderate exacerbation, progression, or side effect of treatment).    The patient's evaluation involved:  ordering and/or review of 3+ test(s) in this encounter (see separate area of note for details)    The patient's management necessitated high risk (a parenteral controlled substance).    Assessment & Plan        I have reviewed the nursing notes. I have reviewed the findings, diagnosis, plan and need for follow up with the patient.    Patient with chronic abdominal pain sickle cell disease at this time presenting with similar complaints including extremity pain patient was treated with protocol and had resolution of her pain and will be following up with hematology.  I did contact hematology and discussed that the reticulocyte count being elevated patient was improved and hematology felt comfortable managing on an outpatient basis.  Did not require  hospitalization.    Final diagnoses:   Chronic abdominal pain   Sickle cell disease with crisis (H)       Jose Eduardo Rush  [facility] EMERGENCY DEPARTMENT  9/20/2024     Jose Eduardo Rush MD  09/21/24 0995

## 2024-09-20 NOTE — ED TRIAGE NOTES
Hx of sickle cell reports bad stomach pain that has been going on for months she is at the point she is crying at night can't sleep couldn't stand up this morning because the pain was traveling from belly button to private area. Intermittent nausea. Has a procedure scheduled in November to r/o the pain.  She believes this pain is different from her sickle cell pain.    Denies urinary sx, vomiting, diarrhea,

## 2024-09-20 NOTE — DISCHARGE INSTRUCTIONS
Discharge to home continue with your current outpatient program and follow-up with your hematology provider

## 2024-09-20 NOTE — ED TRIAGE NOTES
Triage Assessment (Adult)       Row Name 09/20/24 1504          Triage Assessment    Airway WDL WDL        Respiratory WDL    Respiratory WDL WDL        Skin Circulation/Temperature WDL    Skin Circulation/Temperature WDL WDL        Cardiac WDL    Cardiac WDL WDL        Peripheral/Neurovascular WDL    Peripheral Neurovascular WDL WDL

## 2024-09-20 NOTE — TELEPHONE ENCOUNTER
Oncology Nurse Triage - Pain Crisis:    Situation: Jennifer  calling about Sickle Cell Pain Crisis, requesting to be evaluated by provider, or does patient need to go into the ED?     Background:     Patient's last infusion was 9/15 ED visit, 9/16 infusion, 9/18 infusion  Last clinic visit date:8/15/2024 Patricia Mantilla CNP  Does patient have active treatment plan?  Yes      Assessment of Symptoms:  Onset/Duration of symptoms: 2 day    Is it typical sickle cell pain? No   Location: pain in belly button area, radiating to vagina area.   Character: Sharp           Intensity: 9/10    Any radiation of pain, numbness, tingling, weakness, warmth, swelling, discoloration of arms or legs?No     Fever?No    Chest Pain Present: No     Shortness of breath: No     Other home therapies tried: HEAT/HEATING PAD and WARM BATH     Last home medication taken and when: last taken oxycodone this morning 10mg around 9:00am.     Any Refills Needed?: Will need refill on Monday 9/23/2024.     Does patient have transportation & length of time to get to clinic: Yes     Best call back number: [phone]    Recommendations:   Per care team, no available providers today, patient to go to emergency department.

## 2024-09-23 ENCOUNTER — INFUSION THERAPY VISIT (OUTPATIENT)
Dept: TRANSPLANT | Facility: CLINIC | Age: 24
End: 2024-09-23
Attending: PEDIATRICS
Payer: COMMERCIAL

## 2024-09-23 ENCOUNTER — PATIENT OUTREACH (OUTPATIENT)
Dept: CARE COORDINATION | Facility: CLINIC | Age: 24
End: 2024-09-23
Payer: COMMERCIAL

## 2024-09-23 ENCOUNTER — NURSE TRIAGE (OUTPATIENT)
Dept: ONCOLOGY | Facility: CLINIC | Age: 24
End: 2024-09-23
Payer: COMMERCIAL

## 2024-09-23 VITALS
SYSTOLIC BLOOD PRESSURE: 121 MMHG | RESPIRATION RATE: 16 BRPM | HEART RATE: 85 BPM | DIASTOLIC BLOOD PRESSURE: 74 MMHG | TEMPERATURE: 98 F | OXYGEN SATURATION: 93 %

## 2024-09-23 DIAGNOSIS — G89.29 OTHER CHRONIC PAIN: ICD-10-CM

## 2024-09-23 DIAGNOSIS — G81.10 SPASTIC HEMIPLEGIA, UNSPECIFIED ETIOLOGY, UNSPECIFIED LATERALITY (H): ICD-10-CM

## 2024-09-23 DIAGNOSIS — D57.00 SICKLE CELL PAIN CRISIS (H): Primary | ICD-10-CM

## 2024-09-23 DIAGNOSIS — D57.00 SICKLE CELL PAIN CRISIS (H): ICD-10-CM

## 2024-09-23 PROCEDURE — 258N000003 HC RX IP 258 OP 636: Performed by: PEDIATRICS

## 2024-09-23 PROCEDURE — 96376 TX/PRO/DX INJ SAME DRUG ADON: CPT

## 2024-09-23 PROCEDURE — 250N000013 HC RX MED GY IP 250 OP 250 PS 637: Performed by: PEDIATRICS

## 2024-09-23 PROCEDURE — 96375 TX/PRO/DX INJ NEW DRUG ADDON: CPT

## 2024-09-23 PROCEDURE — 250N000011 HC RX IP 250 OP 636: Performed by: PEDIATRICS

## 2024-09-23 PROCEDURE — 96374 THER/PROPH/DIAG INJ IV PUSH: CPT

## 2024-09-23 PROCEDURE — 96361 HYDRATE IV INFUSION ADD-ON: CPT

## 2024-09-23 RX ORDER — ONDANSETRON 4 MG/1
8 TABLET, FILM COATED ORAL
Status: CANCELLED
Start: 2024-10-01

## 2024-09-23 RX ORDER — HEPARIN SODIUM,PORCINE 10 UNIT/ML
5 VIAL (ML) INTRAVENOUS
Status: CANCELLED | OUTPATIENT
Start: 2024-10-01

## 2024-09-23 RX ORDER — HEPARIN SODIUM (PORCINE) LOCK FLUSH IV SOLN 100 UNIT/ML 100 UNIT/ML
5 SOLUTION INTRAVENOUS
Status: DISCONTINUED | OUTPATIENT
Start: 2024-09-23 | End: 2024-09-23 | Stop reason: HOSPADM

## 2024-09-23 RX ORDER — DIPHENHYDRAMINE HCL 25 MG
50 CAPSULE ORAL
Status: COMPLETED | OUTPATIENT
Start: 2024-09-23 | End: 2024-09-23

## 2024-09-23 RX ORDER — DIPHENHYDRAMINE HCL 25 MG
50 CAPSULE ORAL
Status: CANCELLED
Start: 2024-10-01

## 2024-09-23 RX ORDER — GABAPENTIN 300 MG/1
300 CAPSULE ORAL 3 TIMES DAILY
Qty: 90 CAPSULE | Refills: 1 | Status: SHIPPED | OUTPATIENT
Start: 2024-09-23

## 2024-09-23 RX ORDER — ONDANSETRON 2 MG/ML
8 INJECTION INTRAMUSCULAR; INTRAVENOUS EVERY 6 HOURS PRN
Status: CANCELLED
Start: 2024-10-01

## 2024-09-23 RX ORDER — KETOROLAC TROMETHAMINE 30 MG/ML
30 INJECTION, SOLUTION INTRAMUSCULAR; INTRAVENOUS ONCE
Status: COMPLETED | OUTPATIENT
Start: 2024-09-23 | End: 2024-09-23

## 2024-09-23 RX ORDER — OXYCODONE HYDROCHLORIDE 10 MG/1
10 TABLET ORAL EVERY 4 HOURS PRN
Qty: 15 TABLET | Refills: 0 | Status: SHIPPED | OUTPATIENT
Start: 2024-09-23 | End: 2024-09-26

## 2024-09-23 RX ORDER — HEPARIN SODIUM (PORCINE) LOCK FLUSH IV SOLN 100 UNIT/ML 100 UNIT/ML
5 SOLUTION INTRAVENOUS
Status: CANCELLED | OUTPATIENT
Start: 2024-10-01

## 2024-09-23 RX ORDER — ONDANSETRON 2 MG/ML
8 INJECTION INTRAMUSCULAR; INTRAVENOUS EVERY 6 HOURS PRN
Status: DISCONTINUED | OUTPATIENT
Start: 2024-09-23 | End: 2024-09-23 | Stop reason: HOSPADM

## 2024-09-23 RX ORDER — KETOROLAC TROMETHAMINE 30 MG/ML
30 INJECTION, SOLUTION INTRAMUSCULAR; INTRAVENOUS ONCE
Status: CANCELLED
Start: 2024-10-01 | End: 2024-10-01

## 2024-09-23 RX ADMIN — ONDANSETRON 8 MG: 2 INJECTION INTRAMUSCULAR; INTRAVENOUS at 10:22

## 2024-09-23 RX ADMIN — Medication 5 ML: at 13:10

## 2024-09-23 RX ADMIN — HYDROMORPHONE HYDROCHLORIDE 1 MG: 1 INJECTION, SOLUTION INTRAMUSCULAR; INTRAVENOUS; SUBCUTANEOUS at 12:25

## 2024-09-23 RX ADMIN — HYDROMORPHONE HYDROCHLORIDE 1 MG: 1 INJECTION, SOLUTION INTRAMUSCULAR; INTRAVENOUS; SUBCUTANEOUS at 10:23

## 2024-09-23 RX ADMIN — SODIUM CHLORIDE, POTASSIUM CHLORIDE, SODIUM LACTATE AND CALCIUM CHLORIDE 1000 ML: 600; 310; 30; 20 INJECTION, SOLUTION INTRAVENOUS at 10:18

## 2024-09-23 RX ADMIN — HYDROMORPHONE HYDROCHLORIDE 1 MG: 1 INJECTION, SOLUTION INTRAMUSCULAR; INTRAVENOUS; SUBCUTANEOUS at 11:26

## 2024-09-23 RX ADMIN — DIPHENHYDRAMINE HYDROCHLORIDE 50 MG: 25 CAPSULE ORAL at 10:25

## 2024-09-23 RX ADMIN — KETOROLAC TROMETHAMINE 30 MG: 30 INJECTION, SOLUTION INTRAMUSCULAR; INTRAVENOUS at 10:23

## 2024-09-23 ASSESSMENT — PAIN SCALES - GENERAL: PAINLEVEL: EXTREME PAIN (8)

## 2024-09-23 NOTE — TELEPHONE ENCOUNTER
Medication: Gabapentin     Narcotic Refill Request    Medication(s) requested:  Oxycodone  Person Requesting Refill:Jennifer  What pain is the medication treating: sickle cell pain  How is the medication being taken?:Max of 6 tablets per day  Does pt have enough for today? no  Is pain being adequately controlled on the current regimen?: okay, requires IVF/Pain 2 to 3 x weekly  Experiencing any side effects from medication?: denies    Date of most recent appointment:  8/15/2024 Patricia Mantilla   Any No Show Visits:none recently  Next appointment:  10/11/2024  Last fill date and by whom:  9/16/2024 Patricia Mantilla CNP   Reviewed: YES    Send to provider: routed to Patricia Mantilla CNP

## 2024-09-23 NOTE — TELEPHONE ENCOUNTER
Oncology Nurse Triage - Sickle Cell Pain Crisis:    Situation: Jennifer  calling about Sickle Cell Pain Crisis, requesting to be added on for IV fluids and pain medicine    Background:     Patient's last infusion was 9/20/2024   Last clinic visit date:8/15/2024 Patricia johnson CNP  Does patient have active treatment plan?  Yes      Assessment of Symptoms:  Onset/Duration of symptoms: 1 day    Is it typical sickle cell pain? Yes   Location: generalized  Character: Dull ache/sharp           Intensity: 8/10    Any radiation of pain, numbness, tingling, weakness, warmth, swelling, discoloration of arms or legs?No     Fever?No    Chest Pain Present: No     Shortness of breath: No     Other home therapies tried: HEAT/HEATING PAD and WARM BATH     Last home medication taken and when: 6:00am    Any Refills Needed?: Yes , oxycodone and gabapentin, send to [address] [place] [address]     Does patient have transportation & length of time to get to clinic: No   Still ask if an early morning appointment becomes available.       Recommendations:     Meets protocol, on wait list  0808 appt available for 10;30am with [facility] infusion center, Jennifer in agreement, will need transportation for that appt time.     0811  request sent to assist with transportation    0812 Scheduling request sent.     Please note, if you are late for your appt, you risk losing your infusion appt as it may delay another patient's infusion who arrived on time.

## 2024-09-23 NOTE — PROGRESS NOTES
Infusion Nursing Note:  Jennifer Cervantes presents today for IV fluids and pain medications.    Patient seen by provider today: No   present during visit today: n/a    Note:   Pt received a liter of LR over 2 hours.    Pt received benadryl 50 mg po, 8 mg IV zofran, and 30 mg IV reglan.    Pt received 1 mg dilaudid every hour for a total of three doses (3 mg dilaudid total).    Pt requested refills for gabapentin and oxycodone. Prescriptions brought to patient by pharmacy staff.        Intravenous Access:  Implanted Port.    Treatment Conditions:  Not Applicable.      Post Infusion Assessment:  Patient tolerated infusion without incident.  Blood return noted pre and post infusion.  Site patent and intact, free from redness, edema or discomfort.  No evidence of extravasations.  Access discontinued per protocol.       Discharge Plan:   Discharge instructions reviewed with: Patient.  Patient and/or family verbalized understanding of discharge instructions and all questions answered.  Patient discharged in stable condition accompanied by: self.  Departure Mode: Ambulatory.      Jamaica Napoles RN

## 2024-09-23 NOTE — PROGRESS NOTES
Social Work - Transportation  [facility]    Data/Intervention:  Patient Name: Jennifer Cervantes Goes By: Jennifer    /Age: 2000 (24 year old)    Referral From: Ange Abbasi RN  Reason for Referral:  support requested for patient transportation needs for today's appointment.  Assessment:  called [employer] to arrange ride through patient's insurance. [employer] arranged  for patient from home with [employer]. Patient will need to call [phone] when ready for return ride home.  Plan: Patient is aware of the transportation plan.  available to assist with any other needs.    CARLOS Chavez,LGUDAY  Hematology/Oncology Social Worker  Phone:[phone] Pager: [phone]

## 2024-09-23 NOTE — NURSING NOTE
"Oncology Rooming Note    September 23, 2024 11:08 AM   Jennifer Cervantes is a 24 year old female who presents for:    Chief Complaint   Patient presents with    Infusion     Add on infusion appointment for IV fluids and pain medications. Hx sickle cell disease.     Initial Vitals: /74 (BP Location: Right arm, Patient Position: Sitting, Cuff Size: Adult Regular)   Pulse 85   Temp 98  F (36.7  C) (Oral)   Resp 16   SpO2 93%  Estimated body mass index is 26.61 kg/m  as calculated from the following:    Height as of 9/15/24: 1.626 m (5' 4\").    Weight as of 9/15/24: 70.3 kg (155 lb). There is no height or weight on file to calculate BSA.  Extreme Pain (8) Comment: Data Unavailable   No LMP recorded. Patient has had an implant.  Allergies reviewed: Yes  Medications reviewed: Yes    Medications: Medication refills not needed today.  Pharmacy name entered into EPIC:    [facility] [place], [place] - [address] [place] [address] [phone]  [facility] - [place], [place] - [address]     Frailty Screening:   Is the patient here for a new oncology consult visit in cancer care? 2. No      Clinical concerns: none       Jamaica Napoles RN              "

## 2024-09-24 ENCOUNTER — TELEPHONE (OUTPATIENT)
Dept: PHARMACY | Facility: OTHER | Age: 24
End: 2024-09-24

## 2024-09-24 NOTE — TELEPHONE ENCOUNTER
TESS Recruitment: [facility] insurance     Referral outreach attempt #1 on September 24, 2024      Outcome: left voicemail- Call back number [phone]    TESS Shell

## 2024-09-26 ENCOUNTER — INFUSION THERAPY VISIT (OUTPATIENT)
Dept: ONCOLOGY | Facility: CLINIC | Age: 24
End: 2024-09-26
Attending: STUDENT IN AN ORGANIZED HEALTH CARE EDUCATION/TRAINING PROGRAM
Payer: COMMERCIAL

## 2024-09-26 ENCOUNTER — NURSE TRIAGE (OUTPATIENT)
Dept: ONCOLOGY | Facility: CLINIC | Age: 24
End: 2024-09-26
Payer: COMMERCIAL

## 2024-09-26 VITALS
DIASTOLIC BLOOD PRESSURE: 85 MMHG | RESPIRATION RATE: 16 BRPM | HEART RATE: 93 BPM | BODY MASS INDEX: 26.25 KG/M2 | OXYGEN SATURATION: 94 % | SYSTOLIC BLOOD PRESSURE: 123 MMHG | TEMPERATURE: 98.3 F | WEIGHT: 152.9 LBS

## 2024-09-26 DIAGNOSIS — I69.351 HEMIPLEGIA AND HEMIPARESIS FOLLOWING CEREBRAL INFARCTION AFFECTING RIGHT DOMINANT SIDE (H): ICD-10-CM

## 2024-09-26 DIAGNOSIS — G81.10 SPASTIC HEMIPLEGIA, UNSPECIFIED ETIOLOGY, UNSPECIFIED LATERALITY (H): ICD-10-CM

## 2024-09-26 DIAGNOSIS — D57.00 SICKLE CELL PAIN CRISIS (H): ICD-10-CM

## 2024-09-26 DIAGNOSIS — G81.10 SPASTIC HEMIPLEGIA, UNSPECIFIED ETIOLOGY, UNSPECIFIED LATERALITY (H): Primary | ICD-10-CM

## 2024-09-26 DIAGNOSIS — D57.00 SICKLE CELL PAIN CRISIS (H): Primary | ICD-10-CM

## 2024-09-26 PROCEDURE — 64643 CHEMODENERV 1 EXTREM 1-4 EA: CPT | Performed by: STUDENT IN AN ORGANIZED HEALTH CARE EDUCATION/TRAINING PROGRAM

## 2024-09-26 PROCEDURE — 96376 TX/PRO/DX INJ SAME DRUG ADON: CPT | Mod: 59

## 2024-09-26 PROCEDURE — 250N000013 HC RX MED GY IP 250 OP 250 PS 637: Performed by: PEDIATRICS

## 2024-09-26 PROCEDURE — 96361 HYDRATE IV INFUSION ADD-ON: CPT | Mod: 59

## 2024-09-26 PROCEDURE — G0463 HOSPITAL OUTPT CLINIC VISIT: HCPCS | Mod: 25 | Performed by: STUDENT IN AN ORGANIZED HEALTH CARE EDUCATION/TRAINING PROGRAM

## 2024-09-26 PROCEDURE — 250N000011 HC RX IP 250 OP 636: Performed by: PEDIATRICS

## 2024-09-26 PROCEDURE — 96374 THER/PROPH/DIAG INJ IV PUSH: CPT | Mod: 59

## 2024-09-26 PROCEDURE — 96375 TX/PRO/DX INJ NEW DRUG ADDON: CPT | Mod: 59

## 2024-09-26 PROCEDURE — 258N000003 HC RX IP 258 OP 636: Performed by: PEDIATRICS

## 2024-09-26 PROCEDURE — 250N000011 HC RX IP 250 OP 636: Mod: JW | Performed by: PEDIATRICS

## 2024-09-26 PROCEDURE — 64644 CHEMODENERV 1 EXTREM 5/> MUS: CPT | Performed by: STUDENT IN AN ORGANIZED HEALTH CARE EDUCATION/TRAINING PROGRAM

## 2024-09-26 PROCEDURE — 95874 GUIDE NERV DESTR NEEDLE EMG: CPT | Performed by: STUDENT IN AN ORGANIZED HEALTH CARE EDUCATION/TRAINING PROGRAM

## 2024-09-26 RX ORDER — KETOROLAC TROMETHAMINE 30 MG/ML
30 INJECTION, SOLUTION INTRAMUSCULAR; INTRAVENOUS ONCE
Status: COMPLETED | OUTPATIENT
Start: 2024-09-26 | End: 2024-09-26

## 2024-09-26 RX ORDER — HEPARIN SODIUM,PORCINE 10 UNIT/ML
5 VIAL (ML) INTRAVENOUS
Status: CANCELLED | OUTPATIENT
Start: 2025-01-01

## 2024-09-26 RX ORDER — HEPARIN SODIUM,PORCINE 10 UNIT/ML
5 VIAL (ML) INTRAVENOUS
Status: CANCELLED | OUTPATIENT
Start: 2024-10-01

## 2024-09-26 RX ORDER — KETOROLAC TROMETHAMINE 30 MG/ML
30 INJECTION, SOLUTION INTRAMUSCULAR; INTRAVENOUS ONCE
Status: CANCELLED
Start: 2025-01-01 | End: 2025-01-01

## 2024-09-26 RX ORDER — HEPARIN SODIUM (PORCINE) LOCK FLUSH IV SOLN 100 UNIT/ML 100 UNIT/ML
5 SOLUTION INTRAVENOUS
Status: CANCELLED | OUTPATIENT
Start: 2024-10-01

## 2024-09-26 RX ORDER — KETOROLAC TROMETHAMINE 30 MG/ML
30 INJECTION, SOLUTION INTRAMUSCULAR; INTRAVENOUS ONCE
Status: CANCELLED
Start: 2024-10-01 | End: 2024-10-01

## 2024-09-26 RX ORDER — HEPARIN SODIUM (PORCINE) LOCK FLUSH IV SOLN 100 UNIT/ML 100 UNIT/ML
5 SOLUTION INTRAVENOUS
Status: CANCELLED | OUTPATIENT
Start: 2025-01-01

## 2024-09-26 RX ORDER — DIPHENHYDRAMINE HCL 25 MG
50 CAPSULE ORAL
Status: CANCELLED
Start: 2024-10-01

## 2024-09-26 RX ORDER — ONDANSETRON 2 MG/ML
8 INJECTION INTRAMUSCULAR; INTRAVENOUS EVERY 6 HOURS PRN
Status: CANCELLED
Start: 2025-01-01

## 2024-09-26 RX ORDER — ONDANSETRON 4 MG/1
8 TABLET, FILM COATED ORAL
Status: CANCELLED
Start: 2024-10-01

## 2024-09-26 RX ORDER — ONDANSETRON 2 MG/ML
8 INJECTION INTRAMUSCULAR; INTRAVENOUS EVERY 6 HOURS PRN
Status: DISCONTINUED | OUTPATIENT
Start: 2024-09-26 | End: 2024-09-26 | Stop reason: HOSPADM

## 2024-09-26 RX ORDER — DIPHENHYDRAMINE HCL 25 MG
50 CAPSULE ORAL
Status: CANCELLED
Start: 2025-01-01

## 2024-09-26 RX ORDER — ONDANSETRON 4 MG/1
8 TABLET, FILM COATED ORAL
Status: CANCELLED
Start: 2025-01-01

## 2024-09-26 RX ORDER — DIPHENHYDRAMINE HCL 25 MG
50 CAPSULE ORAL
Status: COMPLETED | OUTPATIENT
Start: 2024-09-26 | End: 2024-09-26

## 2024-09-26 RX ORDER — HEPARIN SODIUM,PORCINE 10 UNIT/ML
5 VIAL (ML) INTRAVENOUS
Status: DISCONTINUED | OUTPATIENT
Start: 2024-09-26 | End: 2024-09-26 | Stop reason: HOSPADM

## 2024-09-26 RX ORDER — ONDANSETRON 2 MG/ML
8 INJECTION INTRAMUSCULAR; INTRAVENOUS EVERY 6 HOURS PRN
Status: CANCELLED
Start: 2024-10-01

## 2024-09-26 RX ORDER — HEPARIN SODIUM (PORCINE) LOCK FLUSH IV SOLN 100 UNIT/ML 100 UNIT/ML
5 SOLUTION INTRAVENOUS
Status: DISCONTINUED | OUTPATIENT
Start: 2024-09-26 | End: 2024-09-26 | Stop reason: HOSPADM

## 2024-09-26 RX ADMIN — Medication 5 ML: at 12:14

## 2024-09-26 RX ADMIN — HYDROMORPHONE HYDROCHLORIDE 1 MG: 1 INJECTION, SOLUTION INTRAMUSCULAR; INTRAVENOUS; SUBCUTANEOUS at 11:59

## 2024-09-26 RX ADMIN — KETOROLAC TROMETHAMINE 30 MG: 30 INJECTION, SOLUTION INTRAMUSCULAR; INTRAVENOUS at 09:59

## 2024-09-26 RX ADMIN — HYDROMORPHONE HYDROCHLORIDE 1 MG: 1 INJECTION, SOLUTION INTRAMUSCULAR; INTRAVENOUS; SUBCUTANEOUS at 10:53

## 2024-09-26 RX ADMIN — ONABOTULINUMTOXINA 300 UNITS: 200 INJECTION, POWDER, LYOPHILIZED, FOR SOLUTION INTRADERMAL; INTRAMUSCULAR at 09:20

## 2024-09-26 RX ADMIN — HYDROMORPHONE HYDROCHLORIDE 1 MG: 1 INJECTION, SOLUTION INTRAMUSCULAR; INTRAVENOUS; SUBCUTANEOUS at 09:53

## 2024-09-26 RX ADMIN — DIPHENHYDRAMINE HYDROCHLORIDE 50 MG: 25 CAPSULE ORAL at 09:57

## 2024-09-26 RX ADMIN — SODIUM CHLORIDE, POTASSIUM CHLORIDE, SODIUM LACTATE AND CALCIUM CHLORIDE 1000 ML: 600; 310; 30; 20 INJECTION, SOLUTION INTRAVENOUS at 09:53

## 2024-09-26 RX ADMIN — ONDANSETRON 8 MG: 2 INJECTION INTRAMUSCULAR; INTRAVENOUS at 09:56

## 2024-09-26 ASSESSMENT — PAIN SCALES - GENERAL: PAINLEVEL: EXTREME PAIN (8)

## 2024-09-26 NOTE — NURSING NOTE
"Oncology Rooming Note    September 26, 2024 8:26 AM   Jennifer Cervantes is a 24 year old female who presents for:    No chief complaint on file.    Initial Vitals: /85 (BP Location: Right arm, Patient Position: Sitting, Cuff Size: Adult Regular)   Pulse 93   Temp 98.3  F (36.8  C) (Oral)   Resp 16   Wt 69.4 kg (152 lb 14.4 oz)   SpO2 94%   BMI 26.25 kg/m   Estimated body mass index is 26.25 kg/m  as calculated from the following:    Height as of 9/15/24: 1.626 m (5' 4\").    Weight as of this encounter: 69.4 kg (152 lb 14.4 oz). Body surface area is 1.77 meters squared.  Extreme Pain (8) Comment: Data Unavailable   No LMP recorded. Patient has had an implant.  Allergies reviewed: Yes  Medications reviewed: Yes    Medications: Medication refills not needed today.  Pharmacy name entered into [facility]:    [facility] - [place], [place] - [address] [place] [address] [phone]  [facility] - [place], [place] - [address]     Frailty Screening:   Is the patient here for a new oncology consult visit in cancer care? 2. No      Clinical concerns: none       Zoya Funes              "

## 2024-09-26 NOTE — TELEPHONE ENCOUNTER
Oncology Nurse Triage - Sickle Cell Pain Crisis:  Situation: Jennifer  calling about Sickle Cell Pain Crisis, requesting to be added on for IV fluids and pain medicine    Background:   Patient's last infusion was 9/23/24   Last clinic visit date:8/15/24 w/Patricia Mantilla CNP  Does patient have active treatment plan?  Yes    Assessment of Symptoms:  Onset/Duration of symptoms: 1 day    Is it typical sickle cell pain? Yes   Location: back  Character: Sharp           Intensity: 8/10    Any radiation of pain, numbness, tingling, weakness, warmth, swelling, discoloration of arms or legs?No     Fever?No  Chest Pain Present: No   Shortness of breath: No     Other home therapies tried: HEAT/HEATING PAD and WARM BATH   Last home medication taken and when: 0600 ibuprofen and oxycodone    Any Refills Needed?: Yes refill for Monday of oxycodone. Pended up and routed to Care Team in a separate refill encounter.    Does patient have transportation & length of time to get to clinic: Yes   Appt at 8:30-9:00, no transportation if infusion apt early a.m.  If she has to go home and come back, then she will need transportation.    Recommendations:   If you do not hear from the infusion center by 2pm then you will not be able to get in for an infusion today. If symptoms worsen while waiting for call back, and/or you experience fever, chills, SOB, chest pain, cough, n/v, dizziness, numbness, swelling, discoloration of extremities, then seek emergency evaluation in Emergency Department.     Pt voiced understanding.  Added to infusion wait list.    0749: [facility] can offer apt at 1130. Called Jennifer and she confirmed that she can take that apt. She will be in clinic and can wait until 1130. Does not need a ride home since transportation has been arranged.  Updated infusion charge nurse.   Message sent to [facility] to ask them to schedule.    Message routed to Care Team.

## 2024-09-26 NOTE — PROGRESS NOTES
PM&R Botox Procedure Note    Chief Complaint: Spastic Hemiparesis    /85 (BP Location: Right arm, Patient Position: Sitting, Cuff Size: Adult Regular)   Pulse 93   Temp 98.3  F (36.8  C) (Oral)   Resp 16   Wt 69.4 kg (152 lb 14.4 oz)   SpO2 94%   BMI 26.25 kg/m         Current Outpatient Medications:     acetaminophen (TYLENOL) 325 MG tablet, Take 3 tablets (975 mg) by mouth every 8 hours (Patient not taking: Reported on 9/9/2024), Disp: 270 tablet, Rfl: 0    albuterol (PROAIR HFA/PROVENTIL HFA/VENTOLIN HFA) 108 (90 Base) MCG/ACT inhaler, Inhale 2 puffs into the lungs every 6 hours as needed for shortness of breath or wheezing, Disp: 8.5 g, Rfl: 3    albuterol (PROVENTIL) (2.5 MG/3ML) 0.083% neb solution, Take 2 vials (5 mg) by nebulization every 6 hours as needed for shortness of breath or wheezing, Disp: 90 mL, Rfl: 3    aspirin (ASA) 81 MG chewable tablet, Take 1 tablet (81 mg) by mouth 2 times daily, Disp: 60 tablet, Rfl: 11    budesonide-formoterol (SYMBICORT) 160-4.5 MCG/ACT Inhaler, Inhale 2 puffs twice daily plus 1-2 puffs as needed. May use up to 12 puffs per day., Disp: 20.4 g, Rfl: 11    deferasirox (JADENU) 360 MG tablet, TAKE 4 TABLETS (1,440 MG) BY MOUTH EVERY EVENING., Disp: 120 tablet, Rfl: 1    EPINEPHrine (ANY BX GENERIC EQUIV) 0.3 MG/0.3ML injection 2-pack, Inject 0.3 mLs (0.3 mg) into the muscle as needed for anaphylaxis, Disp: 1 each, Rfl: 1    gabapentin (NEURONTIN) 300 MG capsule, Take 1 capsule (300 mg) by mouth 3 times daily. Take PO 1 pill in evening (300 mg) TID to start. If tolerated, increase by 300 mg in morning or lunch every few days, updating your doctor's office in time to get refills. The maximum dose is 900 mg TID., Disp: 90 capsule, Rfl: 1    hydroxyurea (HYDREA) 500 MG capsule, TAKE 6 CAPSULES (3,000 MG) BY MOUTH ONCE DAILY. (Patient taking differently: Take 3,000 mg by mouth daily), Disp: 180 capsule, Rfl: 3    ibuprofen (ADVIL/MOTRIN) 600 MG tablet, Take 600 mg by  mouth every 6 hours as needed for moderate pain Using rarely, 2x/week at most, Disp: , Rfl:     ibuprofen (ADVIL/MOTRIN) 800 MG tablet, Take 800 mg by mouth every 8 hours as needed for moderate pain, Disp: , Rfl:     melatonin 5 MG tablet, Take 1 tablet (5 mg) by mouth nightly as needed for sleep, Disp: 30 tablet, Rfl: 1    methocarbamol (ROBAXIN) 750 MG tablet, Take 1 tablet (750 mg) by mouth 4 times daily as needed for muscle spasms (during sickle pain crises. Okay to take scheduled while in pain), Disp: 60 tablet, Rfl: 1    naloxone (NARCAN) 4 MG/0.1ML nasal spray, Spray 4 mg into one nostril alternating nostrils as needed for opioid reversal every 2-3 minutes until assistance arrives (Patient not taking: Reported on 9/9/2024), Disp: 0.2 mL, Rfl: 0    naloxone (NARCAN) 4 MG/0.1ML nasal spray, Spray 4 mg into one nostril alternating nostrils as needed for opioid reversal every 2-3 minutes until assistance arrives (Patient not taking: Reported on 9/9/2024), Disp: , Rfl:     omeprazole (PRILOSEC) 20 MG DR capsule, Take 1 capsule (20 mg) by mouth daily, Disp: 30 capsule, Rfl: 0    ondansetron (ZOFRAN ODT) 4 MG ODT tab, Take 1 tablet (4 mg) by mouth every 6 hours as needed for nausea or vomiting., Disp: 10 tablet, Rfl: 0    ondansetron (ZOFRAN ODT) 8 MG ODT tab, Take 1 tablet (8 mg) by mouth every 8 hours as needed for nausea, Disp: 40 tablet, Rfl: 4    oxyCODONE IR (ROXICODONE) 10 MG tablet, Take 1 tablet (10 mg) by mouth every 4 hours as needed for severe pain or breakthrough pain. Goal 4 per day. Max 6 per day., Disp: 15 tablet, Rfl: 0    Current Facility-Administered Medications:     botulinum toxin type A (BOTOX) 100 units injection 300 Units, 300 Units, Intramuscular, Q90 Days, Eric Duncan MD, 300 Units at 09/26/24 0920        Allergies   Allergen Reactions    Contrast Dye Angioedema     Hives and breathing issues    Fish-Derived Products Hives    Seafood Hives    Adhesive Tape Hives     Primipore  "dressing causes hives    Gadolinium     Iodinated Contrast Media         PHYSICAL EXAM      Strength: 4+/5 with right shoulder abduction, 4/5 with right elbow flexion, unable to assess wrist flexion due to contracture. 4/5 with  strength on right. 5/5 left shoulder abduction, elbow extension, wrist extension and  strength/finger intrinsics. 4/5 with right hip flexion, 4/5 with right knee extension, 3/5 with right ankle dorsiflexion, 4+/5 with right EHL, plantar flexion. 5/5 with all muscle groups in left lower extremity.    ROM is 120 degrees with right shoulder abduction, about 130 degrees with right elbow extension.     Tone with MAS-   2/4 with right shoulder abduction   3/4 with right finger flexors/intrinsics   2/4 with right knee flexion.    Significant right wrist contracture with minimal extension.   Sensory: intact to light touch throughout all dermatomes in bilateral lower extremities.      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      Last seen 24 for last set of injections- injected total of 300 U    Since then, she has had significant improvement in tone improving mobility and ADLs over the last several weeks. Feels a little tighter in her right hamstring more so than in the past and also right elbow, forearm pronation and finger flexion, started to notice the tightness coming back over the last month.      RESPONSE TO PREVIOUS TREATMENT:  Significant improvement in tone that lasted \"several\" weeks    BOTULINUM NEUROTOXIN INJECTION PROCEDURES:    VERIFICATION OF PATIENT IDENTIFICATION  Responsible Person:  RUIZ  : verified  Full Name: verified    INDICATIONS FOR PROCEDURES:  Jennifer Cervantes is a 21 year old patient with spasticity affecting the right upper extremity and right lower extremity secondary to a diagnosis of sickle cell disease and previous CVA with resulting spastic hemiparesis with associated " pain, loss of joint motion, loss of volitional motor control, hygiene difficulty, difficulty with activities of daily living and difficulty with ambulation and transfers.    Her baseline symptoms have been recalcitrant to oral medications and conservative therapy.  She is here today for reinjection with Botox.    GOAL OF PROCEDURE:  The goal of this procedure is to improve increase active range of motion, improve volitional motor control, decrease pain  and enhance functional independence associated with spasticity.    TOTAL DOSE ADMINISTERED:  Increased dose today based on increased tone with MAS with right elbow flexion, finger flexion.    Dose Administered:  300 units Botox (Botulinum Toxin Type A)       1:1 Dilution     Diluent Used:  Preservative Free Normal Saline  Total Volume of Diluent Used:  3.0 ml  Please see MAR for lot number and expiry    CONSENT:  The risks, benefits, and treatment options were discussed with Jennifer Cervantes and she agreed to proceed.    EQUIPMENT USED:  Needle-27mm stimulating/recording  EMG/NCS Machine    SKIN PREPARATION:  Skin preparation was performed using an alcohol wipe.    GUIDANCE DESCRIPTION:  Electro-myographic guidance was necessary throughout the procedure to accurately identify all areas of spastic muscles while avoiding injection of non-spastic muscles and underlying muscles , neighboring nerves and nearby vascular structures.     AREA/MUSCLE INJECTED:  Right biceps- 50 U at 2 sites  Right BR- 50 U  Right pronator teres- 50 U  Right FDS- 50 U  Right FCR- 40 U  Right biceps femoris- 60 U at 2 sites    RESPONSE TO PROCEDURE:  Jennifer Cervantes tolerated the procedure well and there were no immediate complications. She was allowed to recover for an appropriate period of time and was discharged home in stable condition.     Patient seen and discussed with attending physician Dr. Pierce. The attending provider was present for the entire procedure documented.    Katherine Pierce,  MD  Physical Medicine & Rehabilitation

## 2024-09-26 NOTE — TELEPHONE ENCOUNTER
Narcotic Refill Request  Person Requesting Refill:Jennifer. Pt is requesting today for refill on Monday    Medication(s) requested:  Oxycodone  Last fill date and by whom:  Patricia Mantilla CNP on 9/23/24  What pain is the medication treating: sickle cell pain  How is the medication being taken?: one tablet Q 4-6 H  Does pt have enough for today? Yes, but would like to  refill on Monday.  Is pain being adequately controlled on the current regimen?: yes  Experiencing any side effects from medication?: no    Date of most recent appointment:  8/15/24 with Patricia Mantilla CNP  Any No Show Visits:No  Next appointment:   10/11/24 with Patricia Mantilla CNP     Reviewed: No access    Routed/Paged provider: Routed to Patricia Mantilla CNP and MAURISIO Asencio

## 2024-09-26 NOTE — LETTER
9/26/2024      Jennifer Cervantes  [address]  [place] [address]      Dear Colleague,    Thank you for referring your patient, Jennifer Cervantes, to the [facility]. Please see a copy of my visit note below.    PM&R Botox Procedure Note    Chief Complaint: Spastic Hemiparesis    /85 (BP Location: Right arm, Patient Position: Sitting, Cuff Size: Adult Regular)   Pulse 93   Temp 98.3  F (36.8  C) (Oral)   Resp 16   Wt 69.4 kg (152 lb 14.4 oz)   SpO2 94%   BMI 26.25 kg/m         Current Outpatient Medications:      acetaminophen (TYLENOL) 325 MG tablet, Take 3 tablets (975 mg) by mouth every 8 hours (Patient not taking: Reported on 9/9/2024), Disp: 270 tablet, Rfl: 0     albuterol (PROAIR HFA/PROVENTIL HFA/VENTOLIN HFA) 108 (90 Base) MCG/ACT inhaler, Inhale 2 puffs into the lungs every 6 hours as needed for shortness of breath or wheezing, Disp: 8.5 g, Rfl: 3     albuterol (PROVENTIL) (2.5 MG/3ML) 0.083% neb solution, Take 2 vials (5 mg) by nebulization every 6 hours as needed for shortness of breath or wheezing, Disp: 90 mL, Rfl: 3     aspirin (ASA) 81 MG chewable tablet, Take 1 tablet (81 mg) by mouth 2 times daily, Disp: 60 tablet, Rfl: 11     budesonide-formoterol (SYMBICORT) 160-4.5 MCG/ACT Inhaler, Inhale 2 puffs twice daily plus 1-2 puffs as needed. May use up to 12 puffs per day., Disp: 20.4 g, Rfl: 11     deferasirox (JADENU) 360 MG tablet, TAKE 4 TABLETS (1,440 MG) BY MOUTH EVERY EVENING., Disp: 120 tablet, Rfl: 1     EPINEPHrine (ANY BX GENERIC EQUIV) 0.3 MG/0.3ML injection 2-pack, Inject 0.3 mLs (0.3 mg) into the muscle as needed for anaphylaxis, Disp: 1 each, Rfl: 1     gabapentin (NEURONTIN) 300 MG capsule, Take 1 capsule (300 mg) by mouth 3 times daily. Take PO 1 pill in evening (300 mg) TID to start. If tolerated, increase by 300 mg in morning or lunch every few days, updating your doctor's office in time to get refills. The maximum dose is 900 mg TID., Disp: 90  capsule, Rfl: 1     hydroxyurea (HYDREA) 500 MG capsule, TAKE 6 CAPSULES (3,000 MG) BY MOUTH ONCE DAILY. (Patient taking differently: Take 3,000 mg by mouth daily), Disp: 180 capsule, Rfl: 3     ibuprofen (ADVIL/MOTRIN) 600 MG tablet, Take 600 mg by mouth every 6 hours as needed for moderate pain Using rarely, 2x/week at most, Disp: , Rfl:      ibuprofen (ADVIL/MOTRIN) 800 MG tablet, Take 800 mg by mouth every 8 hours as needed for moderate pain, Disp: , Rfl:      melatonin 5 MG tablet, Take 1 tablet (5 mg) by mouth nightly as needed for sleep, Disp: 30 tablet, Rfl: 1     methocarbamol (ROBAXIN) 750 MG tablet, Take 1 tablet (750 mg) by mouth 4 times daily as needed for muscle spasms (during sickle pain crises. Okay to take scheduled while in pain), Disp: 60 tablet, Rfl: 1     naloxone (NARCAN) 4 MG/0.1ML nasal spray, Spray 4 mg into one nostril alternating nostrils as needed for opioid reversal every 2-3 minutes until assistance arrives (Patient not taking: Reported on 9/9/2024), Disp: 0.2 mL, Rfl: 0     naloxone (NARCAN) 4 MG/0.1ML nasal spray, Spray 4 mg into one nostril alternating nostrils as needed for opioid reversal every 2-3 minutes until assistance arrives (Patient not taking: Reported on 9/9/2024), Disp: , Rfl:      omeprazole (PRILOSEC) 20 MG DR capsule, Take 1 capsule (20 mg) by mouth daily, Disp: 30 capsule, Rfl: 0     ondansetron (ZOFRAN ODT) 4 MG ODT tab, Take 1 tablet (4 mg) by mouth every 6 hours as needed for nausea or vomiting., Disp: 10 tablet, Rfl: 0     ondansetron (ZOFRAN ODT) 8 MG ODT tab, Take 1 tablet (8 mg) by mouth every 8 hours as needed for nausea, Disp: 40 tablet, Rfl: 4     oxyCODONE IR (ROXICODONE) 10 MG tablet, Take 1 tablet (10 mg) by mouth every 4 hours as needed for severe pain or breakthrough pain. Goal 4 per day. Max 6 per day., Disp: 15 tablet, Rfl: 0    Current Facility-Administered Medications:      botulinum toxin type A (BOTOX) 100 units injection 300 Units, 300 Units,  "Intramuscular, Q90 Days, Eric Duncan MD, 300 Units at 24 0920        Allergies   Allergen Reactions     Contrast Dye Angioedema     Hives and breathing issues     Fish-Derived Products Hives     Seafood Hives     Adhesive Tape Hives     Primipore dressing causes hives     Gadolinium      Iodinated Contrast Media         PHYSICAL EXAM      Strength: 4+/5 with right shoulder abduction, 4/5 with right elbow flexion, unable to assess wrist flexion due to contracture. 4/5 with  strength on right. 5/5 left shoulder abduction, elbow extension, wrist extension and  strength/finger intrinsics. 4/5 with right hip flexion, 4/5 with right knee extension, 3/5 with right ankle dorsiflexion, 4+/5 with right EHL, plantar flexion. 5/5 with all muscle groups in left lower extremity.    ROM is 120 degrees with right shoulder abduction, about 130 degrees with right elbow extension.     Tone with MAS-   2/4 with right shoulder abduction   3/4 with right finger flexors/intrinsics   2/4 with right knee flexion.    Significant right wrist contracture with minimal extension.   Sensory: intact to light touch throughout all dermatomes in bilateral lower extremities.      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      Last seen 24 for last set of injections- injected total of 300 U    Since then, she has had significant improvement in tone improving mobility and ADLs over the last several weeks. Feels a little tighter in her right hamstring more so than in the past and also right elbow, forearm pronation and finger flexion, started to notice the tightness coming back over the last month.      RESPONSE TO PREVIOUS TREATMENT:  Significant improvement in tone that lasted \"several\" weeks    BOTULINUM NEUROTOXIN INJECTION PROCEDURES:    VERIFICATION OF PATIENT IDENTIFICATION  Responsible Person:  RUIZ  : verified  Full Name: " verified    INDICATIONS FOR PROCEDURES:  Jennifer Cervantes is a 21 year old patient with spasticity affecting the right upper extremity and right lower extremity secondary to a diagnosis of sickle cell disease and previous CVA with resulting spastic hemiparesis with associated pain, loss of joint motion, loss of volitional motor control, hygiene difficulty, difficulty with activities of daily living and difficulty with ambulation and transfers.    Her baseline symptoms have been recalcitrant to oral medications and conservative therapy.  She is here today for reinjection with Botox.    GOAL OF PROCEDURE:  The goal of this procedure is to improve increase active range of motion, improve volitional motor control, decrease pain  and enhance functional independence associated with spasticity.    TOTAL DOSE ADMINISTERED:  Increased dose today based on increased tone with MAS with right elbow flexion, finger flexion.    Dose Administered:  300 units Botox (Botulinum Toxin Type A)       1:1 Dilution     Diluent Used:  Preservative Free Normal Saline  Total Volume of Diluent Used:  3.0 ml  Please see MAR for lot number and expiry    CONSENT:  The risks, benefits, and treatment options were discussed with Jennifer Cervantes and she agreed to proceed.    EQUIPMENT USED:  Needle-27mm stimulating/recording  EMG/NCS Machine    SKIN PREPARATION:  Skin preparation was performed using an alcohol wipe.    GUIDANCE DESCRIPTION:  Electro-myographic guidance was necessary throughout the procedure to accurately identify all areas of spastic muscles while avoiding injection of non-spastic muscles and underlying muscles , neighboring nerves and nearby vascular structures.     AREA/MUSCLE INJECTED:  Right biceps- 50 U at 2 sites  Right BR- 50 U  Right pronator teres- 50 U  Right FDS- 50 U  Right FCR- 40 U  Right biceps femoris- 60 U at 2 sites    RESPONSE TO PROCEDURE:  Jennifer Cervantes tolerated the procedure well and there were no immediate  complications. She was allowed to recover for an appropriate period of time and was discharged home in stable condition.     Patient seen and discussed with attending physician Dr. Pierce. The attending provider was present for the entire procedure documented.    Katherine Pierce MD  Physical Medicine & Rehabilitation         Again, thank you for allowing me to participate in the care of your patient.        Sincerely,        Katherine Pierce MD

## 2024-09-26 NOTE — PROGRESS NOTES
"Infusion Nursing Note:  Jennifer Cervantes presents today for IVF and pain medication.    Patient seen by provider today: No    Note: Pt presented to clinic with c/o pain in middle back, aching and sharp 8/10. Pain goal today is \"better\", this was achieved by time of discharge. Pt did not request or require intervention for pain today aside from medications. Pt not driving self.    Intravenous Access:  Implanted Port.    Treatment Conditions:  Not Applicable.    Post Infusion Assessment:  Patient tolerated infusion without incident.  Blood return noted pre and post infusion.  Site patent and intact, free from redness, edema or discomfort.  No evidence of extravasations.  Access discontinued per protocol.    Discharge Plan:   Patient declined prescription refills.  Discharge instructions reviewed with: Patient.  Patient and/or family verbalized understanding of discharge instructions and all questions answered.  AVS to patient via [employer].  Patient will return 10/11/2024 for next appointment.   Patient discharged in stable condition accompanied by: self.  Departure Mode: Ambulatory.    Lupe Bolton RN                  "

## 2024-09-26 NOTE — PATIENT INSTRUCTIONS
Contact Numbers    [facility] Main Line/TRIAGE: [phone]    Call with chills and/or temperature greater than or equal to 100.5, uncontrolled nausea/vomiting, diarrhea, constipation, dizziness, shortness of breath, chest pain, bleeding, unexplained bruising, or any new/concerning symptoms, questions/concerns.     If you are having any concerning symptoms or wish to speak to a provider before your next infusion visit, please call your care coordinator or triage to notify them so we can adequately serve you.       After Hours: [phone]    If after hours, weekends, or holidays, call main hospital  and ask for Oncology doctor on call.      September 2024 Sunday Monday Tuesday Wednesday Thursday Friday Saturday   1     2     3    BMT INFUSION 3 HR (180 MIN)  10:30 AM   (180 min.)    BMT INFUSION NURSE   [facility] [place] 4     5    SPEC INFUSION 2 HR (120 MIN)  10:00 AM   (120 min.)    SIPC INFUSION NURSE   [facility] [place] 6     7       8     9    BMT INFUSION 3 HR (180 MIN)   9:30 AM   (180 min.)    BMT INFUSION NURSE   [facility] [place] 10    Admission  10:23 PM   Francesco Green MD   [facility] Emergency Department   (Discharge: 9/11/2024) 11     12     13    LAB CENTRAL  12:00 PM   (15 min.)    [facility] DRAW   [facility]    ONC INFUSION 4 HR (240 MIN)  12:30 PM   (240 min.)    ONC INFUSION NURSE   [facility] 14       15    Admission   5:08 AM   Cornelia Malloy MD   [facility] Emergency Department   (Discharge: 9/15/2024)    XR GENERAL XRAY   7:00 AM   (20 min.)   URXR3   [facility] Imaging    XR GENERAL XRAY   7:05 AM   (20 min.)   URXR1   [facility] 16    INFUSION 3 HR (180 MIN)  12:00 PM   (180 min.)   UR INFUSION CHAIR   [facility]  Infusion Services [facility] 17     18    XR CHEST 2 VIEWS  11:50 AM   (20 min.)   [id number]   [facility] [place]    FULL PFT W-WO MIP/MEP/MVV  12:15 PM   (60 min.)    PFL B   [facility] Pul[facility] Function Testing [place]    FENO   1:15 PM   (30 min.)    PFL B   [facility] [place]    BMT INFUSION 3 HR (180 MIN)   3:00 PM   (180 min.)   UC BMT INFUSION NURSE   [facility] [place] 19     20    Admission   3:00 PM   Jose Eduardo Rush MD   [facility] Emergency Department   (Discharge: 9/20/2024) 21       22     23    BMT INFUSION 3 HR (180 MIN)  10:30 AM   (180 min.)    BMT INFUSION NURSE   [facility] 24     25     26    NEUROTOXIN   8:15 AM   (60 min.)   Katherine Pierce MD   [facility]    ONC INFUSION 3 HR (180 MIN)  11:30 AM   (180 min.)    ONC INFUSION NURSE   [facility] 27     28       29     30 October 2024 Sunday Monday Tuesday Wednesday Thursday Friday Saturday             1     2     3    [facility] RETURN   4:15 PM   (30 min.)   Suraj Case MD   [facility] Internal Medicine [place] 4     5       6     7     8     9     10     11    LAB CENTRAL  10:00 AM   (15 min.)   [facility]   [facility]    RETURN ACTIVE TREATMENT  10:15 AM   (45 min.)   Patricia Mantilla CNP   [facility]    ONC INFUSION 4 HR (240 MIN)  11:30 AM   (240 min.)    ONC INFUSION NURSE   [facility] 12       13     14     15     16     17     18     19       20     21    LAB CENTRAL  12:30 PM   (15 min.)   [facility] DRAW   [facility]    RETURN CCSL  12:45 PM   (30 min.)   Eric Duncan,  MD C[facility] 22     23     24     25     26       27     28     29     30     31                               Lab Results:  No results found for this or any previous visit (from the past 12 hour(s)).

## 2024-09-27 RX ORDER — OXYCODONE HYDROCHLORIDE 10 MG/1
10 TABLET ORAL EVERY 4 HOURS PRN
Qty: 15 TABLET | Refills: 0 | Status: SHIPPED | OUTPATIENT
Start: 2024-09-30 | End: 2024-10-03

## 2024-09-30 ENCOUNTER — OFFICE VISIT (OUTPATIENT)
Dept: INTERNAL MEDICINE | Facility: CLINIC | Age: 24
End: 2024-09-30
Payer: COMMERCIAL

## 2024-09-30 ENCOUNTER — INFUSION THERAPY VISIT (OUTPATIENT)
Dept: TRANSPLANT | Facility: CLINIC | Age: 24
End: 2024-09-30
Attending: PEDIATRICS
Payer: COMMERCIAL

## 2024-09-30 ENCOUNTER — NURSE TRIAGE (OUTPATIENT)
Dept: ONCOLOGY | Facility: CLINIC | Age: 24
End: 2024-09-30

## 2024-09-30 VITALS — DIASTOLIC BLOOD PRESSURE: 74 MMHG | SYSTOLIC BLOOD PRESSURE: 112 MMHG | OXYGEN SATURATION: 87 % | HEART RATE: 96 BPM

## 2024-09-30 VITALS
HEART RATE: 88 BPM | DIASTOLIC BLOOD PRESSURE: 77 MMHG | SYSTOLIC BLOOD PRESSURE: 128 MMHG | OXYGEN SATURATION: 94 % | TEMPERATURE: 97.9 F | RESPIRATION RATE: 18 BRPM

## 2024-09-30 DIAGNOSIS — D57.00 SICKLE CELL CRISIS (H): ICD-10-CM

## 2024-09-30 DIAGNOSIS — Z79.899 MEDICATION MANAGEMENT: ICD-10-CM

## 2024-09-30 DIAGNOSIS — D57.00 SICKLE CELL PAIN CRISIS (H): Primary | ICD-10-CM

## 2024-09-30 DIAGNOSIS — G81.10 SPASTIC HEMIPLEGIA, UNSPECIFIED ETIOLOGY, UNSPECIFIED LATERALITY (H): ICD-10-CM

## 2024-09-30 DIAGNOSIS — Z11.4 SCREENING FOR HIV (HUMAN IMMUNODEFICIENCY VIRUS): ICD-10-CM

## 2024-09-30 LAB
ABO/RH(D): NORMAL
ANTIBODY SCREEN: NEGATIVE
BASOPHILS # BLD AUTO: 0.2 10E3/UL (ref 0–0.2)
BASOPHILS NFR BLD AUTO: 2 %
EOSINOPHIL # BLD AUTO: 0.6 10E3/UL (ref 0–0.7)
EOSINOPHIL NFR BLD AUTO: 5 %
ERYTHROCYTE [DISTWIDTH] IN BLOOD BY AUTOMATED COUNT: 24.5 % (ref 10–15)
HCT VFR BLD AUTO: 20 % (ref 35–47)
HGB BLD-MCNC: 7.1 G/DL (ref 11.7–15.7)
IMM GRANULOCYTES # BLD: 0.1 10E3/UL
IMM GRANULOCYTES NFR BLD: 1 %
LYMPHOCYTES # BLD AUTO: 1.9 10E3/UL (ref 0.8–5.3)
LYMPHOCYTES NFR BLD AUTO: 17 %
MCH RBC QN AUTO: 31 PG (ref 26.5–33)
MCHC RBC AUTO-ENTMCNC: 35.5 G/DL (ref 31.5–36.5)
MCV RBC AUTO: 87 FL (ref 78–100)
MONOCYTES # BLD AUTO: 1.1 10E3/UL (ref 0–1.3)
MONOCYTES NFR BLD AUTO: 10 %
NEUTROPHILS # BLD AUTO: 7.6 10E3/UL (ref 1.6–8.3)
NEUTROPHILS NFR BLD AUTO: 66 %
NRBC # BLD AUTO: 0.4 10E3/UL
NRBC BLD AUTO-RTO: 4 /100
PLATELET # BLD AUTO: 463 10E3/UL (ref 150–450)
RBC # BLD AUTO: 2.29 10E6/UL (ref 3.8–5.2)
SPECIMEN EXPIRATION DATE: NORMAL
WBC # BLD AUTO: 11.6 10E3/UL (ref 4–11)

## 2024-09-30 PROCEDURE — 99214 OFFICE O/P EST MOD 30 MIN: CPT | Mod: GC | Performed by: PEDIATRICS

## 2024-09-30 PROCEDURE — 86923 COMPATIBILITY TEST ELECTRIC: CPT | Performed by: PEDIATRICS

## 2024-09-30 PROCEDURE — 36591 DRAW BLOOD OFF VENOUS DEVICE: CPT | Performed by: REGISTERED NURSE

## 2024-09-30 PROCEDURE — 258N000003 HC RX IP 258 OP 636: Performed by: PEDIATRICS

## 2024-09-30 PROCEDURE — 96361 HYDRATE IV INFUSION ADD-ON: CPT

## 2024-09-30 PROCEDURE — 96376 TX/PRO/DX INJ SAME DRUG ADON: CPT

## 2024-09-30 PROCEDURE — 96375 TX/PRO/DX INJ NEW DRUG ADDON: CPT

## 2024-09-30 PROCEDURE — 85049 AUTOMATED PLATELET COUNT: CPT | Performed by: REGISTERED NURSE

## 2024-09-30 PROCEDURE — 86900 BLOOD TYPING SEROLOGIC ABO: CPT | Performed by: PEDIATRICS

## 2024-09-30 PROCEDURE — 250N000013 HC RX MED GY IP 250 OP 250 PS 637: Performed by: PEDIATRICS

## 2024-09-30 PROCEDURE — 96374 THER/PROPH/DIAG INJ IV PUSH: CPT

## 2024-09-30 PROCEDURE — 250N000011 HC RX IP 250 OP 636: Performed by: PEDIATRICS

## 2024-09-30 RX ORDER — DIPHENHYDRAMINE HCL 25 MG
50 CAPSULE ORAL
Status: CANCELLED
Start: 2025-01-01

## 2024-09-30 RX ORDER — KETOROLAC TROMETHAMINE 30 MG/ML
30 INJECTION, SOLUTION INTRAMUSCULAR; INTRAVENOUS ONCE
Status: CANCELLED
Start: 2025-01-01 | End: 2025-01-01

## 2024-09-30 RX ORDER — DIPHENHYDRAMINE HCL 50 MG
50 CAPSULE ORAL EVERY 6 HOURS PRN
Qty: 1 CAPSULE | Refills: 0 | Status: SHIPPED | OUTPATIENT
Start: 2024-09-30

## 2024-09-30 RX ORDER — HEPARIN SODIUM,PORCINE 10 UNIT/ML
5 VIAL (ML) INTRAVENOUS
OUTPATIENT
Start: 2025-01-01

## 2024-09-30 RX ORDER — METHYLPREDNISOLONE 32 MG/1
32 TABLET ORAL DAILY
Qty: 1 TABLET | Refills: 0 | Status: SHIPPED | OUTPATIENT
Start: 2024-09-30

## 2024-09-30 RX ORDER — ONDANSETRON 4 MG/1
8 TABLET, FILM COATED ORAL
Start: 2025-01-01

## 2024-09-30 RX ORDER — ONDANSETRON 2 MG/ML
8 INJECTION INTRAMUSCULAR; INTRAVENOUS EVERY 6 HOURS PRN
Status: CANCELLED
Start: 2025-01-01

## 2024-09-30 RX ORDER — HEPARIN SODIUM (PORCINE) LOCK FLUSH IV SOLN 100 UNIT/ML 100 UNIT/ML
5 SOLUTION INTRAVENOUS
OUTPATIENT
Start: 2025-01-01

## 2024-09-30 RX ORDER — DIPHENHYDRAMINE HCL 25 MG
50 CAPSULE ORAL
Status: COMPLETED | OUTPATIENT
Start: 2024-09-30 | End: 2024-09-30

## 2024-09-30 RX ORDER — KETOROLAC TROMETHAMINE 30 MG/ML
30 INJECTION, SOLUTION INTRAMUSCULAR; INTRAVENOUS ONCE
Status: COMPLETED | OUTPATIENT
Start: 2024-09-30 | End: 2024-09-30

## 2024-09-30 RX ORDER — ONDANSETRON 2 MG/ML
8 INJECTION INTRAMUSCULAR; INTRAVENOUS EVERY 6 HOURS PRN
Status: DISCONTINUED | OUTPATIENT
Start: 2024-09-30 | End: 2024-09-30 | Stop reason: HOSPADM

## 2024-09-30 RX ADMIN — HYDROMORPHONE HYDROCHLORIDE 1 MG: 1 INJECTION, SOLUTION INTRAMUSCULAR; INTRAVENOUS; SUBCUTANEOUS at 09:24

## 2024-09-30 RX ADMIN — HYDROMORPHONE HYDROCHLORIDE 1 MG: 1 INJECTION, SOLUTION INTRAMUSCULAR; INTRAVENOUS; SUBCUTANEOUS at 11:28

## 2024-09-30 RX ADMIN — ONDANSETRON 8 MG: 2 INJECTION INTRAMUSCULAR; INTRAVENOUS at 09:24

## 2024-09-30 RX ADMIN — SODIUM CHLORIDE, POTASSIUM CHLORIDE, SODIUM LACTATE AND CALCIUM CHLORIDE 1000 ML: 600; 310; 30; 20 INJECTION, SOLUTION INTRAVENOUS at 09:20

## 2024-09-30 RX ADMIN — DIPHENHYDRAMINE HYDROCHLORIDE 50 MG: 25 CAPSULE ORAL at 09:22

## 2024-09-30 RX ADMIN — HYDROMORPHONE HYDROCHLORIDE 1 MG: 1 INJECTION, SOLUTION INTRAMUSCULAR; INTRAVENOUS; SUBCUTANEOUS at 10:28

## 2024-09-30 RX ADMIN — KETOROLAC TROMETHAMINE 30 MG: 30 INJECTION, SOLUTION INTRAMUSCULAR; INTRAVENOUS at 09:26

## 2024-09-30 ASSESSMENT — ASTHMA QUESTIONNAIRES
ACT_TOTALSCORE: 12
QUESTION_5 LAST FOUR WEEKS HOW WOULD YOU RATE YOUR ASTHMA CONTROL: POORLY CONTROLLED
EMERGENCY_ROOM_LAST_YEAR_TOTAL: TWO
QUESTION_2 LAST FOUR WEEKS HOW OFTEN HAVE YOU HAD SHORTNESS OF BREATH: ONCE A DAY
QUESTION_1 LAST FOUR WEEKS HOW MUCH OF THE TIME DID YOUR ASTHMA KEEP YOU FROM GETTING AS MUCH DONE AT WORK, SCHOOL OR AT HOME: ALL OF THE TIME
QUESTION_3 LAST FOUR WEEKS HOW OFTEN DID YOUR ASTHMA SYMPTOMS (WHEEZING, COUGHING, SHORTNESS OF BREATH, CHEST TIGHTNESS OR PAIN) WAKE YOU UP AT NIGHT OR EARLIER THAN USUAL IN THE MORNING: NOT AT ALL
QUESTION_4 LAST FOUR WEEKS HOW OFTEN HAVE YOU USED YOUR RESCUE INHALER OR NEBULIZER MEDICATION (SUCH AS ALBUTEROL): ONE OR TWO TIMES PER DAY
ACT_TOTALSCORE: 12

## 2024-09-30 ASSESSMENT — PAIN SCALES - GENERAL: PAINLEVEL: EXTREME PAIN (8)

## 2024-09-30 NOTE — PROGRESS NOTES
"  Assessment & Plan       24 year old female with Hx sickle cell anemia and recurrent sickle cell pain crises presenting for a follow up visit for recurrent abdominal pain. No contrast CT has been performed during an episode, nor lactate, recent lipase normal.   Ddx: mesenteric artery occlusion, ulcer, digestive problem  Mesenteric artery occulusion as a result of hemoglobinopathy seems most likely given the patient's hsitory, sudden onset nature of the symptoms, and gradual resolution. CTA imaging was ordered with pretreatment to assess mesenteric vasculature. Additional labs ordered to assess for overall GI function for other potential causes.       (D57.00) Sickle cell pain crisis (H)  (primary encounter diagnosis)  (D57.00) Sickle cell crisis (H)  Comment: Patient has recurrent sickle cell pain crises and may have mesenteric artery ischemia.   Plan: Calprotectin Feces, Helicobacter pylori Antigen        Stool, CRP, inflammation, methylPREDNISolone         (MEDROL) 32 MG tablet, diphenhydrAMINE         (BENADRYL) 50 MG capsule, CT Abdomen Pelvis w/o        & w Contrast, CANCELED: Hemoglobin                  BMI  Estimated body mass index is 26.25 kg/m  as calculated from the following:    Height as of 9/15/24: 1.626 m (5' 4\").    Weight as of 9/26/24: 69.4 kg (152 lb 14.4 oz).             No follow-ups on file.    Leona Rodriguez is a 24 year old, presenting for the following health issues:  Follow Up      9/30/2024     1:21 PM   Additional Questions   Roomed by KTR     History of Present Illness       Reason for visit:  Checking in with my doctor about my health  Symptom onset:  More than a month  Symptom intensity:  Moderate  Symptom progression:  Improving  Had these symptoms before:  Yes  Has tried/received treatment for these symptoms:  Yes  Previous treatment was successful:  No She is missing 1 dose(s) of medications per week.     In/out of ER with stomach pain. No clear diagnosis. Has had gallbladder " removed previously. Is scheduled for EGD 11/7. She describes it as a sharp pain starting in her belly button suddenly without relation to food and radiates to the pubic area. No relation to bladder function. No change with stool. Associated with nausea/vomiting. After an episode the pain improves gradually. Pain stays unless she distracts herself from it. Last depot shot 1.5 years ago per patient. She isn't going to get a BMT for sickle cell.                       Objective    /74 (BP Location: Right arm, Patient Position: Sitting, Cuff Size: Adult Regular)   Pulse 96   SpO2 (!) 87%   There is no height or weight on file to calculate BMI.  Physical Exam   GENERAL: alert and no distress  NECK: no adenopathy, no asymmetry, masses, or scars  RESP: lungs clear to auscultation - no rales, rhonchi or wheezes  CV: regular rate and rhythm, normal S1 S2, no S3 or S4, no murmur, click or rub, no peripheral edema  ABDOMEN: soft, nontender, no hepatosplenomegaly, no masses and bowel sounds normal  MS: right sided spastic paralysis of the arm, no edema            Jace Elizabeth MD-PhD  PSTP- PGY - 1      Signed Electronically by: Suraj Case MD

## 2024-09-30 NOTE — PROGRESS NOTES
"Infusion Nursing Note:  Jennifer Cervantes presents today for add on infusion.    Patient seen by provider today: No   present during visit today: Not Applicable.    Note: Jennifer arrived today with \"all over\" pain. States she has been helping take care of her niece and needing to walk up and down stairs more frequently. She also reports increased SOB with exertion and is concerned her Hgb is low today. Denies chest pain, s/s bleeding, fever. ONC toxicity assessment completed, home medications and allergies reviewed. [employer] @0912 - Patricia Mantilla PA-C/Jennifer Lai RN - Draw CBC today. OK to transfuse 1U PRBC for Hgb less than 7.0. Patient received 1L LR over 2 hours, Zofran 8 mg IVP, Toradol 30 mg IVP, benadryl 50 mg PO, and dilaudid 1 mg IVP every 60 minutes x 3 doses. Patient reports pain improvement following final dose of dilaudid.       Intravenous Access:  Implanted Port.    Treatment Conditions:  Lab Results   Component Value Date    HGB 7.1 (L) 09/30/2024    WBC 11.6 (H) 09/30/2024    ANEU 8.1 09/18/2024    ANEUTAUTO 7.6 09/30/2024     (H) 09/30/2024            Post Infusion Assessment:  Patient tolerated infusion without incident.  Blood return noted pre and post infusion.  Site patent and intact, free from redness, edema or discomfort.  No evidence of extravasations.  Access discontinued per protocol.       Discharge Plan:   AVS to patient via MYCHART.  Patient will return 10/3 for next appointment.   Patient discharged in stable condition accompanied by: self.  Departure Mode: Ambulatory.      Jennifer Lai RN    "

## 2024-09-30 NOTE — TELEPHONE ENCOUNTER
Oncology Nurse Triage - Sickle Cell Pain Crisis:    Situation: Jennifer  calling about Sickle Cell Pain Crisis, requesting to be added on for IV fluids and pain medicine    Background:   Patient's last infusion was 9/26/24  Last clinic visit date:8/15/24 w/ Patricia Mantilla  Does patient have active treatment plan?  Yes    Assessment of Symptoms:  Onset/Duration of symptoms: 3 day    Is it typical sickle cell pain? Yes   Location: generalized all over   Character: Sharp           Intensity: 8/10    Any radiation of pain, numbness, tingling, weakness, warmth, swelling, discoloration of arms or legs?No     Fever? No    Chest Pain Present: No     Shortness of breath: No     Other home therapies tried: HEAT/HEATING PAD and WARM BATH     Last home medication taken and when: 0600 Oxycodone and IBU    Any Refills Needed?: No     Does patient have transportation & length of time to get to clinic: No -pt coming to pharmacy to  meds at 0830, otherwise will need transportation set up.     Recommendations:   Added to infusion wait list.   0708 return call to pt to offer 9am infusion appt in BMT infusion, pt accepting.   0733 message sent to scheduling.

## 2024-10-02 RX ORDER — HEPARIN SODIUM (PORCINE) LOCK FLUSH IV SOLN 100 UNIT/ML 100 UNIT/ML
5 SOLUTION INTRAVENOUS
Status: CANCELLED | OUTPATIENT
Start: 2024-10-02

## 2024-10-02 RX ORDER — HEPARIN SODIUM,PORCINE 10 UNIT/ML
5 VIAL (ML) INTRAVENOUS
Status: CANCELLED | OUTPATIENT
Start: 2024-10-02

## 2024-10-02 NOTE — PROGRESS NOTES
[facility]: Cancer Care                                                                                          Completed chart audit to update Oncology Care Coordination enrollment status.  Reviewed POC and pt has appropriate follow up scheduled. Pt was in infusion today and will see Patricia Mantilla on 5/24/24.        Signature:  Arely Krueger RN   73F h/o hypothyroidism, CVA x2 [2018] w/residual left-sided weakness, who p/w 24 hours of progressive chest pain, NSTEMI and subsequently found ot have critical left main coronary artery disease. Was ASA / Brilinta loaded. IABP placed in cath lab. Echo with normal biventricular dysfunction and without significant valvular abnormalities.       - 69-year-old lady with severe multivessel coronary artery disease, would benefit from surgical revascularization. Plan for coronary artery bypass grafting this Thursday [10/3/2024].      Rafael Martini MD  Cardiac Surgery 73F h/o hypothyroidism, CVA x2 [2018] w/residual left-sided weakness, who p/w 24 hours of progressive chest pain, NSTEMI and subsequently found ot have critical left main coronary artery disease. Was ASA / Brilinta loaded. IABP placed in cath lab. Echo with normal biventricular dysfunction and without significant valvular abnormalities.       - 69-year-old lady with severe multivessel coronary artery disease, would benefit from surgical revascularization. Plan for coronary artery bypass grafting this Thursday [10/3/2024].      Rafael Martini MD  Cardiac Surgery

## 2024-10-03 ENCOUNTER — NURSE TRIAGE (OUTPATIENT)
Dept: ONCOLOGY | Facility: CLINIC | Age: 24
End: 2024-10-03

## 2024-10-03 ENCOUNTER — LAB (OUTPATIENT)
Dept: LAB | Facility: CLINIC | Age: 24
End: 2024-10-03
Payer: COMMERCIAL

## 2024-10-03 ENCOUNTER — INFUSION THERAPY VISIT (OUTPATIENT)
Dept: TRANSPLANT | Facility: CLINIC | Age: 24
End: 2024-10-03
Attending: PEDIATRICS
Payer: COMMERCIAL

## 2024-10-03 VITALS
DIASTOLIC BLOOD PRESSURE: 62 MMHG | OXYGEN SATURATION: 92 % | HEART RATE: 83 BPM | RESPIRATION RATE: 16 BRPM | TEMPERATURE: 98.1 F | SYSTOLIC BLOOD PRESSURE: 95 MMHG

## 2024-10-03 DIAGNOSIS — D57.00 SICKLE CELL PAIN CRISIS (H): ICD-10-CM

## 2024-10-03 DIAGNOSIS — Z11.4 SCREENING FOR HIV (HUMAN IMMUNODEFICIENCY VIRUS): ICD-10-CM

## 2024-10-03 DIAGNOSIS — D57.00 SICKLE CELL CRISIS (H): ICD-10-CM

## 2024-10-03 DIAGNOSIS — G81.10 SPASTIC HEMIPLEGIA, UNSPECIFIED ETIOLOGY, UNSPECIFIED LATERALITY (H): ICD-10-CM

## 2024-10-03 DIAGNOSIS — D57.1 HB-SS DISEASE WITHOUT CRISIS (H): Primary | ICD-10-CM

## 2024-10-03 DIAGNOSIS — Z86.73 HISTORY OF STROKE: ICD-10-CM

## 2024-10-03 PROCEDURE — 96375 TX/PRO/DX INJ NEW DRUG ADDON: CPT

## 2024-10-03 PROCEDURE — 258N000003 HC RX IP 258 OP 636: Performed by: PEDIATRICS

## 2024-10-03 PROCEDURE — P9016 RBC LEUKOCYTES REDUCED: HCPCS | Performed by: PEDIATRICS

## 2024-10-03 PROCEDURE — 250N000013 HC RX MED GY IP 250 OP 250 PS 637: Performed by: PEDIATRICS

## 2024-10-03 PROCEDURE — 96374 THER/PROPH/DIAG INJ IV PUSH: CPT

## 2024-10-03 PROCEDURE — 36430 TRANSFUSION BLD/BLD COMPNT: CPT

## 2024-10-03 PROCEDURE — 250N000011 HC RX IP 250 OP 636: Performed by: PEDIATRICS

## 2024-10-03 PROCEDURE — 96376 TX/PRO/DX INJ SAME DRUG ADON: CPT

## 2024-10-03 RX ORDER — HEPARIN SODIUM (PORCINE) LOCK FLUSH IV SOLN 100 UNIT/ML 100 UNIT/ML
5 SOLUTION INTRAVENOUS
Status: CANCELLED | OUTPATIENT
Start: 2025-01-01

## 2024-10-03 RX ORDER — ONDANSETRON 2 MG/ML
8 INJECTION INTRAMUSCULAR; INTRAVENOUS EVERY 6 HOURS PRN
Status: CANCELLED
Start: 2025-01-01

## 2024-10-03 RX ORDER — KETOROLAC TROMETHAMINE 30 MG/ML
30 INJECTION, SOLUTION INTRAMUSCULAR; INTRAVENOUS ONCE
Status: CANCELLED
Start: 2025-01-01 | End: 2025-01-01

## 2024-10-03 RX ORDER — KETOROLAC TROMETHAMINE 30 MG/ML
30 INJECTION, SOLUTION INTRAMUSCULAR; INTRAVENOUS ONCE
Status: COMPLETED | OUTPATIENT
Start: 2024-10-03 | End: 2024-10-03

## 2024-10-03 RX ORDER — DIPHENHYDRAMINE HCL 25 MG
50 CAPSULE ORAL
Status: CANCELLED
Start: 2025-01-01

## 2024-10-03 RX ORDER — DIPHENHYDRAMINE HCL 25 MG
50 CAPSULE ORAL
Status: COMPLETED | OUTPATIENT
Start: 2024-10-03 | End: 2024-10-03

## 2024-10-03 RX ORDER — OXYCODONE HYDROCHLORIDE 10 MG/1
10 TABLET ORAL EVERY 4 HOURS PRN
Qty: 15 TABLET | Refills: 0 | Status: SHIPPED | OUTPATIENT
Start: 2024-10-07 | End: 2024-10-14

## 2024-10-03 RX ORDER — HEPARIN SODIUM,PORCINE 10 UNIT/ML
5 VIAL (ML) INTRAVENOUS
Status: CANCELLED | OUTPATIENT
Start: 2025-01-01

## 2024-10-03 RX ORDER — ONDANSETRON 4 MG/1
8 TABLET, FILM COATED ORAL
Status: CANCELLED
Start: 2025-01-01

## 2024-10-03 RX ORDER — ONDANSETRON 2 MG/ML
8 INJECTION INTRAMUSCULAR; INTRAVENOUS EVERY 6 HOURS PRN
Status: DISCONTINUED | OUTPATIENT
Start: 2024-10-03 | End: 2024-10-03 | Stop reason: HOSPADM

## 2024-10-03 RX ADMIN — HYDROMORPHONE HYDROCHLORIDE 1 MG: 1 INJECTION, SOLUTION INTRAMUSCULAR; INTRAVENOUS; SUBCUTANEOUS at 11:55

## 2024-10-03 RX ADMIN — SODIUM CHLORIDE, POTASSIUM CHLORIDE, SODIUM LACTATE AND CALCIUM CHLORIDE 1000 ML: 600; 310; 30; 20 INJECTION, SOLUTION INTRAVENOUS at 09:56

## 2024-10-03 RX ADMIN — ONDANSETRON 8 MG: 2 INJECTION INTRAMUSCULAR; INTRAVENOUS at 09:55

## 2024-10-03 RX ADMIN — KETOROLAC TROMETHAMINE 30 MG: 30 INJECTION, SOLUTION INTRAMUSCULAR; INTRAVENOUS at 09:55

## 2024-10-03 RX ADMIN — HYDROMORPHONE HYDROCHLORIDE 1 MG: 1 INJECTION, SOLUTION INTRAMUSCULAR; INTRAVENOUS; SUBCUTANEOUS at 10:54

## 2024-10-03 RX ADMIN — HYDROMORPHONE HYDROCHLORIDE 1 MG: 1 INJECTION, SOLUTION INTRAMUSCULAR; INTRAVENOUS; SUBCUTANEOUS at 09:52

## 2024-10-03 RX ADMIN — DIPHENHYDRAMINE HYDROCHLORIDE 50 MG: 25 CAPSULE ORAL at 09:52

## 2024-10-03 NOTE — TELEPHONE ENCOUNTER
Per infusion nurses they can give IVF/PM during blood transfusion     Call placed to Jennifer to update her.

## 2024-10-03 NOTE — TELEPHONE ENCOUNTER
Oncology Nurse Triage - Sickle Cell Pain Crisis:    Situation: Jennifer  calling about Sickle Cell Pain Crisis, requesting to be added on for IV fluids and pain medicine    Background:     Patient's last infusion was 9/30/24  Last clinic visit date:8/15/24 Patricia Mantilla   Does patient have active treatment plan?  Yes      Assessment of Symptoms:  Onset/Duration of symptoms: 1 day    Is it typical sickle cell pain? Yes   Location: generalized   Character: Sharp and Dull ache           Intensity: 8/10    Any radiation of pain, numbness, tingling, weakness, warmth, swelling, discoloration of arms or legs?No     Fever?No  (if yes max temperature recorded in last 24 hours):      Chest Pain Present: No     Shortness of breath: No     Other home therapies tried: HEAT/HEATING PAD and WARM BATH     Last home medication taken and when: 6am ibuprofen and oxycodone     Any Refills Needed?: No     Does patient have transportation & length of time to get to clinic: Yes takes 20 mins         Recommendations:     Reached out to Patricia Mantilla to see if oK to add on to blood transfusion?     Approved by Patricia Mantilla     If you do not hear from the infusion center by 2pm then you will not be able to get in for an infusion today. If symptoms worsen while waiting for call back, and/or you experience fever, chills, SOB, chest pain, cough, n/v, dizziness, numbness, swelling, discoloration of extremities, then seek emergency evaluation in Emergency Department.     Please note, if you are late for your appt, you risk losing your infusion appt as it may delay another patient's infusion who arrived on time.

## 2024-10-03 NOTE — PROGRESS NOTES
Infusion Nursing Note:  Jennifer Cervantes presents today for scheduled infusion.    Patient seen by provider today: No   present during visit today: Not Applicable.    Note: Patient received one unit RBC per blood plan. Patient also received dilaudid x3, toradol, benadryl, and 1 L LR bolus per treatment plan for 8/10 generalized pain with some relief. Patient stable prior to discharge.      Intravenous Access:  Implanted Port.    Treatment Conditions:  Results reviewed, labs MET treatment parameters, ok to proceed with treatment.      Post Infusion Assessment:  Patient tolerated infusion without incident.       Discharge Plan:   Patient and/or family verbalized understanding of discharge instructions and all questions answered.      Vicenta Boone RN

## 2024-10-03 NOTE — TELEPHONE ENCOUNTER
Wants to get oxycodone refill set up for Monday     Narcotic Refill Request    Medication(s) requested:  oxycodone   Person Requesting Refill:Jennifer   What pain is the medication treating:  sickle cell  How is the medication being taken?:every 6 hours if needed 4 tabs max per day   Does pt have enough for today? Has enough until Monday   Is pain being adequately controlled on the current regimen?: yes   Experiencing any side effects from medication?: No     Date of most recent appointment:  8/15/24 Patricia Johnson   Any No Show Visits:No   Next appointment:   10/11/24 Patricia johnson   Last fill date and by whom:  9/27/24 Patricia Johnson    Reviewed: No access     Send to provider: Patricia Johnson  and Arely Krueger

## 2024-10-04 ENCOUNTER — HOSPITAL ENCOUNTER (EMERGENCY)
Facility: CLINIC | Age: 24
Discharge: HOME | End: 2024-10-04
Attending: EMERGENCY MEDICINE | Admitting: EMERGENCY MEDICINE
Payer: COMMERCIAL

## 2024-10-04 VITALS
HEART RATE: 81 BPM | BODY MASS INDEX: 26.09 KG/M2 | TEMPERATURE: 98.3 F | DIASTOLIC BLOOD PRESSURE: 89 MMHG | WEIGHT: 152 LBS | SYSTOLIC BLOOD PRESSURE: 134 MMHG | OXYGEN SATURATION: 91 % | RESPIRATION RATE: 16 BRPM

## 2024-10-04 DIAGNOSIS — D57.00 SICKLE CELL PAIN CRISIS (H): ICD-10-CM

## 2024-10-04 LAB
ALBUMIN SERPL BCG-MCNC: 4.7 G/DL (ref 3.5–5.2)
ALP SERPL-CCNC: 60 U/L (ref 40–150)
ALT SERPL W P-5'-P-CCNC: 28 U/L (ref 0–50)
ANION GAP SERPL CALCULATED.3IONS-SCNC: 10 MMOL/L (ref 7–15)
AST SERPL W P-5'-P-CCNC: 45 U/L (ref 0–45)
BASOPHILS # BLD AUTO: 0.2 10E3/UL (ref 0–0.2)
BASOPHILS NFR BLD AUTO: 2 %
BILIRUB SERPL-MCNC: 3.1 MG/DL
BUN SERPL-MCNC: 9.5 MG/DL (ref 6–20)
CALCIUM SERPL-MCNC: 9.3 MG/DL (ref 8.8–10.4)
CHLORIDE SERPL-SCNC: 106 MMOL/L (ref 98–107)
CREAT SERPL-MCNC: 0.62 MG/DL (ref 0.51–0.95)
EGFRCR SERPLBLD CKD-EPI 2021: >90 ML/MIN/1.73M2
EOSINOPHIL # BLD AUTO: 0.6 10E3/UL (ref 0–0.7)
EOSINOPHIL NFR BLD AUTO: 5 %
ERYTHROCYTE [DISTWIDTH] IN BLOOD BY AUTOMATED COUNT: 22 % (ref 10–15)
GLUCOSE SERPL-MCNC: 95 MG/DL (ref 70–99)
HCO3 SERPL-SCNC: 24 MMOL/L (ref 22–29)
HCT VFR BLD AUTO: 24.4 % (ref 35–47)
HGB BLD-MCNC: 9 G/DL (ref 11.7–15.7)
IMM GRANULOCYTES # BLD: 0 10E3/UL
IMM GRANULOCYTES NFR BLD: 0 %
LYMPHOCYTES # BLD AUTO: 1.8 10E3/UL (ref 0.8–5.3)
LYMPHOCYTES NFR BLD AUTO: 16 %
MCH RBC QN AUTO: 32.3 PG (ref 26.5–33)
MCHC RBC AUTO-ENTMCNC: 36.9 G/DL (ref 31.5–36.5)
MCV RBC AUTO: 88 FL (ref 78–100)
MONOCYTES # BLD AUTO: 0.8 10E3/UL (ref 0–1.3)
MONOCYTES NFR BLD AUTO: 7 %
NEUTROPHILS # BLD AUTO: 8 10E3/UL (ref 1.6–8.3)
NEUTROPHILS NFR BLD AUTO: 70 %
NRBC # BLD AUTO: 0.2 10E3/UL
NRBC BLD AUTO-RTO: 2 /100
PLATELET # BLD AUTO: 476 10E3/UL (ref 150–450)
POTASSIUM SERPL-SCNC: 4.1 MMOL/L (ref 3.4–5.3)
PROT SERPL-MCNC: 7.5 G/DL (ref 6.4–8.3)
RBC # BLD AUTO: 2.79 10E6/UL (ref 3.8–5.2)
SODIUM SERPL-SCNC: 140 MMOL/L (ref 135–145)
WBC # BLD AUTO: 11.5 10E3/UL (ref 4–11)

## 2024-10-04 PROCEDURE — 85018 HEMOGLOBIN: CPT | Performed by: EMERGENCY MEDICINE

## 2024-10-04 PROCEDURE — 36415 COLL VENOUS BLD VENIPUNCTURE: CPT | Performed by: EMERGENCY MEDICINE

## 2024-10-04 PROCEDURE — 99284 EMERGENCY DEPT VISIT MOD MDM: CPT | Mod: 25 | Performed by: EMERGENCY MEDICINE

## 2024-10-04 PROCEDURE — 82435 ASSAY OF BLOOD CHLORIDE: CPT | Performed by: EMERGENCY MEDICINE

## 2024-10-04 PROCEDURE — 250N000009 HC RX 250: Performed by: EMERGENCY MEDICINE

## 2024-10-04 PROCEDURE — 250N000011 HC RX IP 250 OP 636: Performed by: EMERGENCY MEDICINE

## 2024-10-04 PROCEDURE — 94640 AIRWAY INHALATION TREATMENT: CPT | Performed by: EMERGENCY MEDICINE

## 2024-10-04 PROCEDURE — 96375 TX/PRO/DX INJ NEW DRUG ADDON: CPT | Performed by: EMERGENCY MEDICINE

## 2024-10-04 PROCEDURE — 85004 AUTOMATED DIFF WBC COUNT: CPT | Performed by: EMERGENCY MEDICINE

## 2024-10-04 PROCEDURE — 99284 EMERGENCY DEPT VISIT MOD MDM: CPT | Performed by: EMERGENCY MEDICINE

## 2024-10-04 PROCEDURE — 96374 THER/PROPH/DIAG INJ IV PUSH: CPT | Performed by: EMERGENCY MEDICINE

## 2024-10-04 PROCEDURE — 96376 TX/PRO/DX INJ SAME DRUG ADON: CPT | Performed by: EMERGENCY MEDICINE

## 2024-10-04 RX ORDER — ONDANSETRON 2 MG/ML
4 INJECTION INTRAMUSCULAR; INTRAVENOUS EVERY 30 MIN PRN
Status: DISCONTINUED | OUTPATIENT
Start: 2024-10-04 | End: 2024-10-04 | Stop reason: HOSPADM

## 2024-10-04 RX ORDER — KETOROLAC TROMETHAMINE 15 MG/ML
15 INJECTION, SOLUTION INTRAMUSCULAR; INTRAVENOUS ONCE
Status: COMPLETED | OUTPATIENT
Start: 2024-10-04 | End: 2024-10-04

## 2024-10-04 RX ORDER — HEPARIN SODIUM (PORCINE) LOCK FLUSH IV SOLN 100 UNIT/ML 100 UNIT/ML
5-10 SOLUTION INTRAVENOUS
Status: DISCONTINUED | OUTPATIENT
Start: 2024-10-04 | End: 2024-10-04 | Stop reason: HOSPADM

## 2024-10-04 RX ORDER — HEPARIN SODIUM,PORCINE 10 UNIT/ML
5-10 VIAL (ML) INTRAVENOUS EVERY 24 HOURS
Status: DISCONTINUED | OUTPATIENT
Start: 2024-10-04 | End: 2024-10-04 | Stop reason: HOSPADM

## 2024-10-04 RX ORDER — ALBUTEROL SULFATE 0.83 MG/ML
2.5 SOLUTION RESPIRATORY (INHALATION) ONCE
Status: COMPLETED | OUTPATIENT
Start: 2024-10-04 | End: 2024-10-04

## 2024-10-04 RX ORDER — HEPARIN SODIUM,PORCINE 10 UNIT/ML
5-10 VIAL (ML) INTRAVENOUS
Status: DISCONTINUED | OUTPATIENT
Start: 2024-10-04 | End: 2024-10-04 | Stop reason: HOSPADM

## 2024-10-04 RX ADMIN — KETOROLAC TROMETHAMINE 15 MG: 15 INJECTION, SOLUTION INTRAMUSCULAR; INTRAVENOUS at 15:55

## 2024-10-04 RX ADMIN — HEPARIN, PORCINE (PF) 10 UNIT/ML INTRAVENOUS SYRINGE 10 ML: at 18:16

## 2024-10-04 RX ADMIN — HYDROMORPHONE HYDROCHLORIDE 1 MG: 1 INJECTION, SOLUTION INTRAMUSCULAR; INTRAVENOUS; SUBCUTANEOUS at 16:09

## 2024-10-04 RX ADMIN — ALBUTEROL SULFATE 2.5 MG: 2.5 SOLUTION RESPIRATORY (INHALATION) at 15:53

## 2024-10-04 RX ADMIN — HYDROMORPHONE HYDROCHLORIDE 1 MG: 1 INJECTION, SOLUTION INTRAMUSCULAR; INTRAVENOUS; SUBCUTANEOUS at 18:02

## 2024-10-04 RX ADMIN — HYDROMORPHONE HYDROCHLORIDE 1 MG: 1 INJECTION, SOLUTION INTRAMUSCULAR; INTRAVENOUS; SUBCUTANEOUS at 17:16

## 2024-10-04 ASSESSMENT — ACTIVITIES OF DAILY LIVING (ADL)
ADLS_ACUITY_SCORE: 37

## 2024-10-04 NOTE — DISCHARGE INSTRUCTIONS
Home tonight    Please make an appointment to follow up with Your Primary Care Provider in 3-5 days if not improving.
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
## 2024-10-04 NOTE — ED TRIAGE NOTES
Had her blood transfusion yesterday, has been having a sickle cell crisis ever since. States her home pain meds aren't working. Endorses pain generalized throughout her entire body.

## 2024-10-04 NOTE — Clinical Note
Jennifer Cervantes was seen and treated in our emergency department on 10/4/2024.  She may return to work on 10/07/2024.       If you have any questions or concerns, please don't hesitate to call.      Favian Martin MD

## 2024-10-06 NOTE — PROGRESS NOTES
I, Suraj Case MD saw the patient with the resident, and agree with the resident's findings and plan of care as documented in the resident's note.  /74 (BP Location: Right arm, Patient Position: Sitting, Cuff Size: Adult Regular)   Pulse 96   SpO2 (!) 87%   I personally reviewed vital signs and past record.  Key findings: abd pain with additional pain reminiscent of SMA syndrome

## 2024-10-07 ENCOUNTER — NURSE TRIAGE (OUTPATIENT)
Dept: ONCOLOGY | Facility: CLINIC | Age: 24
End: 2024-10-07
Payer: COMMERCIAL

## 2024-10-07 NOTE — TELEPHONE ENCOUNTER
Jennifer calling about status:  As of 1018 no appt openings, this writer updated Jennifer encourage oral fluids and still pending on wait list.
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
## 2024-10-07 NOTE — TELEPHONE ENCOUNTER
Oncology Nurse Triage - Sickle Cell Pain Crisis:    Situation: Jennifer  calling about Sickle Cell Pain Crisis, requesting to be added on for IV fluids and pain medicine    Background:     Patient's last infusion was 10/3/24 in infusion; 10/4/24 in ED  Last clinic visit date: 8/15/24 w/ Patricia Mantilla   Does patient have active treatment plan?  Yes    Assessment of Symptoms:  Onset/Duration of symptoms: 2 day    Is it typical sickle cell pain? Yes   Location: all over  Character: Sharp           Intensity: 7/10    Any radiation of pain, numbness, tingling, weakness, warmth, swelling, discoloration of arms or legs?No     Fever? No    Chest Pain Present: No     Shortness of breath: No     Other home therapies tried: HEAT/HEATING PAD and WARM BATH     Last home medication taken and when: 0600 Oxycodone and IBU    Any Refills Needed?: No     Does patient have transportation & length of time to get to clinic: No -if something opens early, she can get a ride (plans to come to pharmacy around 0830), if later, will need transportation set up.     Recommendations:   0715 secure chat to Patricia Mantilla for provider approval.   Okayed by Patricia to come in to infusion today if availability comes up.    If you do not hear from the infusion center by 2pm then you will not be able to get in for an infusion today. If symptoms worsen while waiting for call back, and/or you experience fever, chills, SOB, chest pain, cough, n/v, dizziness, numbness, swelling, discoloration of extremities, then seek emergency evaluation in Emergency Department.     Please note, if you are late for your appt, you risk losing your infusion appt as it may delay another patient's infusion who arrived on time.

## 2024-10-08 ENCOUNTER — NURSE TRIAGE (OUTPATIENT)
Dept: ONCOLOGY | Facility: CLINIC | Age: 24
End: 2024-10-08
Payer: COMMERCIAL

## 2024-10-08 ENCOUNTER — INFUSION THERAPY VISIT (OUTPATIENT)
Dept: TRANSPLANT | Facility: CLINIC | Age: 24
End: 2024-10-08
Attending: PEDIATRICS
Payer: COMMERCIAL

## 2024-10-08 ENCOUNTER — PATIENT OUTREACH (OUTPATIENT)
Dept: CARE COORDINATION | Facility: CLINIC | Age: 24
End: 2024-10-08
Payer: COMMERCIAL

## 2024-10-08 VITALS
DIASTOLIC BLOOD PRESSURE: 73 MMHG | HEART RATE: 87 BPM | TEMPERATURE: 98.2 F | RESPIRATION RATE: 18 BRPM | SYSTOLIC BLOOD PRESSURE: 115 MMHG | OXYGEN SATURATION: 97 %

## 2024-10-08 DIAGNOSIS — D57.00 SICKLE CELL PAIN CRISIS (H): Primary | ICD-10-CM

## 2024-10-08 DIAGNOSIS — G81.10 SPASTIC HEMIPLEGIA, UNSPECIFIED ETIOLOGY, UNSPECIFIED LATERALITY (H): ICD-10-CM

## 2024-10-08 PROCEDURE — 96376 TX/PRO/DX INJ SAME DRUG ADON: CPT

## 2024-10-08 PROCEDURE — 250N000011 HC RX IP 250 OP 636: Performed by: PEDIATRICS

## 2024-10-08 PROCEDURE — 96375 TX/PRO/DX INJ NEW DRUG ADDON: CPT

## 2024-10-08 PROCEDURE — 250N000013 HC RX MED GY IP 250 OP 250 PS 637: Performed by: PEDIATRICS

## 2024-10-08 PROCEDURE — 96374 THER/PROPH/DIAG INJ IV PUSH: CPT

## 2024-10-08 RX ORDER — KETOROLAC TROMETHAMINE 30 MG/ML
30 INJECTION, SOLUTION INTRAMUSCULAR; INTRAVENOUS ONCE
Status: CANCELLED
Start: 2025-01-01 | End: 2025-01-01

## 2024-10-08 RX ORDER — KETOROLAC TROMETHAMINE 30 MG/ML
30 INJECTION, SOLUTION INTRAMUSCULAR; INTRAVENOUS ONCE
Status: COMPLETED | OUTPATIENT
Start: 2024-10-08 | End: 2024-10-08

## 2024-10-08 RX ORDER — DIPHENHYDRAMINE HCL 25 MG
50 CAPSULE ORAL
Status: COMPLETED | OUTPATIENT
Start: 2024-10-08 | End: 2024-10-08

## 2024-10-08 RX ORDER — ONDANSETRON 2 MG/ML
8 INJECTION INTRAMUSCULAR; INTRAVENOUS EVERY 6 HOURS PRN
Status: DISCONTINUED | OUTPATIENT
Start: 2024-10-08 | End: 2024-10-08 | Stop reason: HOSPADM

## 2024-10-08 RX ORDER — ONDANSETRON 2 MG/ML
8 INJECTION INTRAMUSCULAR; INTRAVENOUS EVERY 6 HOURS PRN
Status: CANCELLED
Start: 2025-01-01

## 2024-10-08 RX ORDER — HEPARIN SODIUM,PORCINE 10 UNIT/ML
5 VIAL (ML) INTRAVENOUS
Status: CANCELLED | OUTPATIENT
Start: 2025-01-01

## 2024-10-08 RX ORDER — DIPHENHYDRAMINE HCL 25 MG
50 CAPSULE ORAL
Status: CANCELLED
Start: 2025-01-01

## 2024-10-08 RX ORDER — ONDANSETRON 4 MG/1
8 TABLET, FILM COATED ORAL
Status: CANCELLED
Start: 2025-01-01

## 2024-10-08 RX ORDER — HEPARIN SODIUM (PORCINE) LOCK FLUSH IV SOLN 100 UNIT/ML 100 UNIT/ML
5 SOLUTION INTRAVENOUS
Status: CANCELLED | OUTPATIENT
Start: 2025-01-01

## 2024-10-08 RX ORDER — HEPARIN SODIUM (PORCINE) LOCK FLUSH IV SOLN 100 UNIT/ML 100 UNIT/ML
5 SOLUTION INTRAVENOUS ONCE
Status: COMPLETED | OUTPATIENT
Start: 2024-10-08 | End: 2024-10-08

## 2024-10-08 RX ADMIN — ONDANSETRON 8 MG: 2 INJECTION INTRAMUSCULAR; INTRAVENOUS at 09:32

## 2024-10-08 RX ADMIN — Medication 5 ML: at 11:35

## 2024-10-08 RX ADMIN — KETOROLAC TROMETHAMINE 30 MG: 30 INJECTION, SOLUTION INTRAMUSCULAR; INTRAVENOUS at 09:33

## 2024-10-08 RX ADMIN — DIPHENHYDRAMINE HYDROCHLORIDE 50 MG: 25 CAPSULE ORAL at 09:34

## 2024-10-08 RX ADMIN — HYDROMORPHONE HYDROCHLORIDE 1 MG: 1 INJECTION, SOLUTION INTRAMUSCULAR; INTRAVENOUS; SUBCUTANEOUS at 11:35

## 2024-10-08 RX ADMIN — HYDROMORPHONE HYDROCHLORIDE 1 MG: 1 INJECTION, SOLUTION INTRAMUSCULAR; INTRAVENOUS; SUBCUTANEOUS at 10:37

## 2024-10-08 RX ADMIN — HYDROMORPHONE HYDROCHLORIDE 1 MG: 1 INJECTION, SOLUTION INTRAMUSCULAR; INTRAVENOUS; SUBCUTANEOUS at 09:33

## 2024-10-08 ASSESSMENT — PAIN SCALES - GENERAL: PAINLEVEL: EXTREME PAIN (8)

## 2024-10-08 NOTE — TELEPHONE ENCOUNTER
Oncology Nurse Triage - Sickle Cell Pain Crisis:  Situation: Jennifer  calling about Sickle Cell Pain Crisis, requesting to be added on for IV fluids and pain medicine     Background:   Patient's last infusion was 10/3/24 in infusion; 10/4/24 in ED  Last clinic visit date: 8/15/24 w/ Patricia Khalilmaria eugenia   Does patient have active treatment plan?  Yes     Assessment of Symptoms:  Onset/Duration of symptoms: 3 day     Is it typical sickle cell pain? Yes   Location: all over, worse in left arm near shoulder to elbow  Character: Dull           Intensity: 8/10     Any radiation of pain, numbness, tingling, weakness, warmth, swelling, discoloration of arms or legs? No      Fever? No     Chest Pain Present: No      Shortness of breath: No      Other home therapies tried: HEAT/HEATING PAD and WARM BATH      Last home medication taken and when: 0600 Oxycodone and IBU     Any Refills Needed?: No      Does patient have transportation & length of time to get to clinic:      Recommendations:   Added to infusion wait list. Reminded pt due to IVF shortage, if infusion can take her they will only administer the pain medications and no fluids. Encouraged pt to drink plenty of fluids PO. Pt voiced understanding.   0810 call to pt to offer BMT Infusion appt as soon as she can here. Pt states she will Uber in and can be here around 0830.   0814 message sent to scheduling.   Call from pt, who states her Uber is on the way and is about 7 minutes out, wondering if she can come closer to 9am. Vicenta, charge nurse, said okay. Scheduling updated to change appt  time.

## 2024-10-08 NOTE — PROGRESS NOTES
Social Work - Transportation  [facility]    Data/Intervention:  Patient Name: Jennifer Cervantes Goes By: Jennifer    /Age: 2000 (24 year old)    Referral From: Patient  Reason for Referral:  support requested for patient transportation needs for today's appointment.  Assessment:  called [employer] to arrange ride through patient's insurance. [employer] arranged  for patient from home with [employer]. Patient will need to call [phone] when ready for return ride home.  Plan: Patient is aware of the transportation plan.  available to assist with any other needs.    CARLOS Chavez,UDAY  Hematology/Oncology Social Worker  Phone:[phone] Pager: [phone]

## 2024-10-08 NOTE — PROGRESS NOTES
Infusion Nursing Note:  Jennifer Cervantes presents today for add-on infusion.    Patient seen by provider today: No   present during visit today: Not Applicable.    Note: Patient received dilaudid x3, zofran, benadryl and toradol per treatment plan for 8/10 bilateral arm pain with some relief. No LR given due to shortage; patient encouraged to push oral replacement. Patient was monitored post infusion and was stable prior to discharge.      Intravenous Access:  Implanted Port.    Treatment Conditions:  Results reviewed, labs MET treatment parameters, ok to proceed with treatment.      Post Infusion Assessment:  Patient tolerated infusion without incident.       Discharge Plan:   Patient and/or family verbalized understanding of discharge instructions and all questions answered.      Vicenta Boone RN

## 2024-10-09 DIAGNOSIS — D57.00 SICKLE CELL PAIN CRISIS (H): ICD-10-CM

## 2024-10-09 NOTE — TELEPHONE ENCOUNTER
The [facility] RN called and spoke with the patient's pharmacy who verified that the patient picked up the prescriptions for Benadryl and Medrol on 10/3/2024. The RN called and spoke with the patient. The patient stated that the imaging department wanted the patient to take Benadryl x1 capsule and Medrol x1 tablet 12 hours before the imaging appointment and then take Benadryl x1 capsule and Medrol x1 tablet 2 hours before the patient's imaging appointment. The patient verified that she still has tablet each of Benadryl and Medrol at home, but needs a second dose of both of these rxs.

## 2024-10-09 NOTE — TELEPHONE ENCOUNTER
Narcotic Refill Request    Medication(s) requested:  Oxycodone  Person Requesting Refill: Patient  What pain is the medication treating: Sickle cell pain  How is the medication being taken?: Taking 3-4 tablets per day, will take up to 6 if needed.  Does pt have enough for today? Yes, enough to get her through Monday  Is pain being adequately controlled on the current regimen?: Yes  Experiencing any side effects from medication?: No    Date of most recent appointment:  8/15/24 Patricia Mantilla   Any No Show Visits:No   Next appointment:   10/11/24 Patricia salgado  Last fill date and by whom:  10/07/24 by Patricia Mantilla   Reviewed: No access    Routed provider: Patricia Mantilla

## 2024-10-09 NOTE — TELEPHONE ENCOUNTER
[facility] Call Center    Phone Message    May a detailed message be left on voicemail: yes     Reason for Call: Medication Refill Request    Has the patient contacted the pharmacy for the refill? Yes   Name of medication being requested: methylPREDNISolone (MEDROL) 32 MG tablet  and  diphenhydrAMINE (BENADRYL) 50 MG capsule  Provider who prescribed the medication:   Pharmacy: [address]   Date medication is needed: ASAP     Per pt need a fill on these because she need to take these medication 12 hours before and 2 hour before her appt which got reschedule to next week. Please call pt back if any questions. Thank you.     Action Taken: Message routed to:  [facility] MARIBEL    Travel Screening: Not Applicable     Date of Service:

## 2024-10-10 RX ORDER — ALBUTEROL SULFATE 0.83 MG/ML
2.5 SOLUTION RESPIRATORY (INHALATION)
Status: CANCELLED | OUTPATIENT
Start: 2024-10-11

## 2024-10-10 RX ORDER — HEPARIN SODIUM,PORCINE 10 UNIT/ML
5 VIAL (ML) INTRAVENOUS
Status: CANCELLED | OUTPATIENT
Start: 2024-10-11

## 2024-10-10 RX ORDER — ALBUTEROL SULFATE 90 UG/1
1-2 INHALANT RESPIRATORY (INHALATION)
Status: CANCELLED
Start: 2024-10-11

## 2024-10-10 RX ORDER — DIPHENHYDRAMINE HCL 50 MG
50 CAPSULE ORAL EVERY 6 HOURS PRN
Qty: 1 CAPSULE | Refills: 0 | Status: SHIPPED | OUTPATIENT
Start: 2024-10-10 | End: 2024-11-01

## 2024-10-10 RX ORDER — OXYCODONE HYDROCHLORIDE 10 MG/1
10 TABLET ORAL EVERY 4 HOURS PRN
Qty: 15 TABLET | Refills: 0 | OUTPATIENT
Start: 2024-10-10

## 2024-10-10 RX ORDER — DIPHENHYDRAMINE HYDROCHLORIDE 50 MG/ML
50 INJECTION INTRAMUSCULAR; INTRAVENOUS
Status: CANCELLED
Start: 2024-10-11

## 2024-10-10 RX ORDER — HEPARIN SODIUM (PORCINE) LOCK FLUSH IV SOLN 100 UNIT/ML 100 UNIT/ML
5 SOLUTION INTRAVENOUS
Status: CANCELLED | OUTPATIENT
Start: 2024-10-11

## 2024-10-10 RX ORDER — METHYLPREDNISOLONE 32 MG/1
32 TABLET ORAL DAILY
Qty: 1 TABLET | Refills: 0 | Status: SHIPPED | OUTPATIENT
Start: 2024-10-10 | End: 2024-11-01

## 2024-10-10 RX ORDER — METHYLPREDNISOLONE SODIUM SUCCINATE 125 MG/2ML
125 INJECTION INTRAMUSCULAR; INTRAVENOUS
Status: CANCELLED
Start: 2024-10-11

## 2024-10-10 RX ORDER — EPINEPHRINE 1 MG/ML
0.3 INJECTION, SOLUTION INTRAMUSCULAR; SUBCUTANEOUS EVERY 5 MIN PRN
Status: CANCELLED | OUTPATIENT
Start: 2024-10-11

## 2024-10-10 RX ORDER — MEPERIDINE HYDROCHLORIDE 25 MG/ML
25 INJECTION INTRAMUSCULAR; INTRAVENOUS; SUBCUTANEOUS EVERY 30 MIN PRN
Status: CANCELLED | OUTPATIENT
Start: 2024-10-11

## 2024-10-11 ENCOUNTER — NURSE TRIAGE (OUTPATIENT)
Dept: ONCOLOGY | Facility: CLINIC | Age: 24
End: 2024-10-11
Payer: COMMERCIAL

## 2024-10-11 ENCOUNTER — INFUSION THERAPY VISIT (OUTPATIENT)
Dept: ONCOLOGY | Facility: CLINIC | Age: 24
End: 2024-10-11
Attending: REGISTERED NURSE
Payer: COMMERCIAL

## 2024-10-11 ENCOUNTER — APPOINTMENT (OUTPATIENT)
Dept: LAB | Facility: CLINIC | Age: 24
End: 2024-10-11
Attending: REGISTERED NURSE
Payer: COMMERCIAL

## 2024-10-11 VITALS
SYSTOLIC BLOOD PRESSURE: 130 MMHG | HEART RATE: 82 BPM | OXYGEN SATURATION: 97 % | TEMPERATURE: 98.3 F | WEIGHT: 156.6 LBS | BODY MASS INDEX: 26.88 KG/M2 | RESPIRATION RATE: 16 BRPM | DIASTOLIC BLOOD PRESSURE: 73 MMHG

## 2024-10-11 DIAGNOSIS — G81.10 SPASTIC HEMIPLEGIA, UNSPECIFIED ETIOLOGY, UNSPECIFIED LATERALITY (H): ICD-10-CM

## 2024-10-11 DIAGNOSIS — E83.111 IRON OVERLOAD DUE TO REPEATED RED BLOOD CELL TRANSFUSIONS: ICD-10-CM

## 2024-10-11 DIAGNOSIS — D57.1 HB-SS DISEASE WITHOUT CRISIS (H): Primary | ICD-10-CM

## 2024-10-11 DIAGNOSIS — G89.29 OTHER CHRONIC PAIN: ICD-10-CM

## 2024-10-11 DIAGNOSIS — D57.00 SICKLE CELL PAIN CRISIS (H): Primary | ICD-10-CM

## 2024-10-11 LAB
ANION GAP SERPL CALCULATED.3IONS-SCNC: 10 MMOL/L (ref 7–15)
BASOPHILS # BLD AUTO: 0.3 10E3/UL (ref 0–0.2)
BASOPHILS NFR BLD AUTO: 2 %
BUN SERPL-MCNC: 8.7 MG/DL (ref 6–20)
CALCIUM SERPL-MCNC: 9.1 MG/DL (ref 8.8–10.4)
CHLORIDE SERPL-SCNC: 105 MMOL/L (ref 98–107)
CREAT SERPL-MCNC: 0.49 MG/DL (ref 0.51–0.95)
EGFRCR SERPLBLD CKD-EPI 2021: >90 ML/MIN/1.73M2
EOSINOPHIL # BLD AUTO: 0.4 10E3/UL (ref 0–0.7)
EOSINOPHIL NFR BLD AUTO: 3 %
ERYTHROCYTE [DISTWIDTH] IN BLOOD BY AUTOMATED COUNT: 22 % (ref 10–15)
GLUCOSE SERPL-MCNC: 93 MG/DL (ref 70–99)
HCO3 SERPL-SCNC: 23 MMOL/L (ref 22–29)
HCT VFR BLD AUTO: 22.1 % (ref 35–47)
HGB BLD-MCNC: 7.7 G/DL (ref 11.7–15.7)
IMM GRANULOCYTES # BLD: 0.1 10E3/UL
IMM GRANULOCYTES NFR BLD: 1 %
LYMPHOCYTES # BLD AUTO: 1.8 10E3/UL (ref 0.8–5.3)
LYMPHOCYTES NFR BLD AUTO: 16 %
MCH RBC QN AUTO: 30.1 PG (ref 26.5–33)
MCHC RBC AUTO-ENTMCNC: 34.8 G/DL (ref 31.5–36.5)
MCV RBC AUTO: 86 FL (ref 78–100)
MONOCYTES # BLD AUTO: 0.9 10E3/UL (ref 0–1.3)
MONOCYTES NFR BLD AUTO: 8 %
NEUTROPHILS # BLD AUTO: 8.2 10E3/UL (ref 1.6–8.3)
NEUTROPHILS NFR BLD AUTO: 71 %
NRBC # BLD AUTO: 0.2 10E3/UL
NRBC BLD AUTO-RTO: 2 /100
PLATELET # BLD AUTO: 498 10E3/UL (ref 150–450)
POTASSIUM SERPL-SCNC: 3.6 MMOL/L (ref 3.4–5.3)
RBC # BLD AUTO: 2.56 10E6/UL (ref 3.8–5.2)
RETICS # AUTO: 0.46 10E6/UL (ref 0.03–0.1)
RETICS/RBC NFR AUTO: 19.1 % (ref 0.5–2)
SODIUM SERPL-SCNC: 138 MMOL/L (ref 135–145)
WBC # BLD AUTO: 11.6 10E3/UL (ref 4–11)

## 2024-10-11 PROCEDURE — 96375 TX/PRO/DX INJ NEW DRUG ADDON: CPT

## 2024-10-11 PROCEDURE — 90656 IIV3 VACC NO PRSV 0.5 ML IM: CPT | Performed by: REGISTERED NURSE

## 2024-10-11 PROCEDURE — 36591 DRAW BLOOD OFF VENOUS DEVICE: CPT | Performed by: PEDIATRICS

## 2024-10-11 PROCEDURE — G2211 COMPLEX E/M VISIT ADD ON: HCPCS | Performed by: REGISTERED NURSE

## 2024-10-11 PROCEDURE — 250N000011 HC RX IP 250 OP 636: Performed by: REGISTERED NURSE

## 2024-10-11 PROCEDURE — 96365 THER/PROPH/DIAG IV INF INIT: CPT

## 2024-10-11 PROCEDURE — 250N000011 HC RX IP 250 OP 636: Performed by: PEDIATRICS

## 2024-10-11 PROCEDURE — 258N000003 HC RX IP 258 OP 636: Performed by: PEDIATRICS

## 2024-10-11 PROCEDURE — 80048 BASIC METABOLIC PNL TOTAL CA: CPT | Performed by: PEDIATRICS

## 2024-10-11 PROCEDURE — 96361 HYDRATE IV INFUSION ADD-ON: CPT

## 2024-10-11 PROCEDURE — 250N000013 HC RX MED GY IP 250 OP 250 PS 637: Performed by: PEDIATRICS

## 2024-10-11 PROCEDURE — G0009 ADMIN PNEUMOCOCCAL VACCINE: HCPCS | Performed by: REGISTERED NURSE

## 2024-10-11 PROCEDURE — 96376 TX/PRO/DX INJ SAME DRUG ADON: CPT

## 2024-10-11 PROCEDURE — 90732 PPSV23 VACC 2 YRS+ SUBQ/IM: CPT | Performed by: REGISTERED NURSE

## 2024-10-11 PROCEDURE — G0463 HOSPITAL OUTPT CLINIC VISIT: HCPCS | Mod: 25 | Performed by: REGISTERED NURSE

## 2024-10-11 PROCEDURE — 96413 CHEMO IV INFUSION 1 HR: CPT

## 2024-10-11 PROCEDURE — 85045 AUTOMATED RETICULOCYTE COUNT: CPT | Performed by: PEDIATRICS

## 2024-10-11 PROCEDURE — G0008 ADMIN INFLUENZA VIRUS VAC: HCPCS | Performed by: REGISTERED NURSE

## 2024-10-11 PROCEDURE — 99215 OFFICE O/P EST HI 40 MIN: CPT | Performed by: REGISTERED NURSE

## 2024-10-11 PROCEDURE — 85025 COMPLETE CBC W/AUTO DIFF WBC: CPT | Performed by: PEDIATRICS

## 2024-10-11 PROCEDURE — 99417 PROLNG OP E/M EACH 15 MIN: CPT | Performed by: REGISTERED NURSE

## 2024-10-11 RX ORDER — ONDANSETRON 2 MG/ML
8 INJECTION INTRAMUSCULAR; INTRAVENOUS EVERY 6 HOURS PRN
Status: DISCONTINUED | OUTPATIENT
Start: 2024-10-11 | End: 2024-10-11 | Stop reason: HOSPADM

## 2024-10-11 RX ORDER — DIPHENHYDRAMINE HCL 25 MG
50 CAPSULE ORAL
Status: COMPLETED | OUTPATIENT
Start: 2024-10-11 | End: 2024-10-11

## 2024-10-11 RX ORDER — DIPHENHYDRAMINE HCL 25 MG
50 CAPSULE ORAL
Status: CANCELLED
Start: 2025-01-01

## 2024-10-11 RX ORDER — KETOROLAC TROMETHAMINE 30 MG/ML
30 INJECTION, SOLUTION INTRAMUSCULAR; INTRAVENOUS ONCE
Status: CANCELLED
Start: 2025-01-01 | End: 2025-01-01

## 2024-10-11 RX ORDER — ONDANSETRON 4 MG/1
8 TABLET, FILM COATED ORAL
Status: CANCELLED
Start: 2025-01-01

## 2024-10-11 RX ORDER — KETOROLAC TROMETHAMINE 30 MG/ML
30 INJECTION, SOLUTION INTRAMUSCULAR; INTRAVENOUS ONCE
Status: COMPLETED | OUTPATIENT
Start: 2024-10-11 | End: 2024-10-11

## 2024-10-11 RX ORDER — HEPARIN SODIUM (PORCINE) LOCK FLUSH IV SOLN 100 UNIT/ML 100 UNIT/ML
5 SOLUTION INTRAVENOUS
Status: DISCONTINUED | OUTPATIENT
Start: 2024-10-11 | End: 2024-10-11 | Stop reason: HOSPADM

## 2024-10-11 RX ORDER — HEPARIN SODIUM (PORCINE) LOCK FLUSH IV SOLN 100 UNIT/ML 100 UNIT/ML
5 SOLUTION INTRAVENOUS
Status: CANCELLED | OUTPATIENT
Start: 2025-01-01

## 2024-10-11 RX ORDER — HEPARIN SODIUM,PORCINE 10 UNIT/ML
5 VIAL (ML) INTRAVENOUS
Status: CANCELLED | OUTPATIENT
Start: 2025-01-01

## 2024-10-11 RX ORDER — ONDANSETRON 2 MG/ML
8 INJECTION INTRAMUSCULAR; INTRAVENOUS EVERY 6 HOURS PRN
Status: CANCELLED
Start: 2025-01-01

## 2024-10-11 RX ORDER — HEPARIN SODIUM (PORCINE) LOCK FLUSH IV SOLN 100 UNIT/ML 100 UNIT/ML
500 SOLUTION INTRAVENOUS ONCE
Status: COMPLETED | OUTPATIENT
Start: 2024-10-11 | End: 2024-10-11

## 2024-10-11 RX ADMIN — SODIUM CHLORIDE 100 ML: 9 INJECTION, SOLUTION INTRAVENOUS at 11:57

## 2024-10-11 RX ADMIN — Medication 5 ML: at 15:16

## 2024-10-11 RX ADMIN — DIPHENHYDRAMINE HYDROCHLORIDE 50 MG: 25 CAPSULE ORAL at 11:53

## 2024-10-11 RX ADMIN — ONDANSETRON 8 MG: 2 INJECTION INTRAMUSCULAR; INTRAVENOUS at 12:03

## 2024-10-11 RX ADMIN — PNEUMOCOCCAL VACCINE POLYVALENT 0.5 ML
25; 25; 25; 25; 25; 25; 25; 25; 25; 25; 25; 25; 25; 25; 25; 25; 25; 25; 25; 25; 25; 25; 25 INJECTION, SOLUTION INTRAMUSCULAR; SUBCUTANEOUS at 14:35

## 2024-10-11 RX ADMIN — KETOROLAC TROMETHAMINE 30 MG: 30 INJECTION, SOLUTION INTRAMUSCULAR; INTRAVENOUS at 12:07

## 2024-10-11 RX ADMIN — SODIUM CHLORIDE 355 MG: 9 INJECTION, SOLUTION INTRAVENOUS at 12:24

## 2024-10-11 RX ADMIN — HYDROMORPHONE HYDROCHLORIDE 1 MG: 1 INJECTION, SOLUTION INTRAMUSCULAR; INTRAVENOUS; SUBCUTANEOUS at 13:30

## 2024-10-11 RX ADMIN — Medication 500 UNITS: at 10:27

## 2024-10-11 RX ADMIN — HYDROMORPHONE HYDROCHLORIDE 1 MG: 1 INJECTION, SOLUTION INTRAMUSCULAR; INTRAVENOUS; SUBCUTANEOUS at 11:58

## 2024-10-11 RX ADMIN — HYDROMORPHONE HYDROCHLORIDE 1 MG: 1 INJECTION, SOLUTION INTRAMUSCULAR; INTRAVENOUS; SUBCUTANEOUS at 14:31

## 2024-10-11 RX ADMIN — INFLUENZA A VIRUS A/VICTORIA/4897/2022 IVR-238 (H1N1) ANTIGEN (FORMALDEHYDE INACTIVATED), INFLUENZA A VIRUS A/CALIFORNIA/122/2022 SAN-022 (H3N2) ANTIGEN (FORMALDEHYDE INACTIVATED), AND INFLUENZA B VIRUS B/MICHIGAN/01/2021 ANTIGEN (FORMALDEHYDE INACTIVATED) 0.5 ML: 15; 15; 15 INJECTION, SUSPENSION INTRAMUSCULAR at 13:33

## 2024-10-11 ASSESSMENT — PAIN SCALES - GENERAL: PAINLEVEL: SEVERE PAIN (6)

## 2024-10-11 NOTE — TELEPHONE ENCOUNTER
Oncology Nurse Triage - Sickle Cell Pain Crisis:    Situation: Jennifer  calling about Sickle Cell Pain Crisis, requesting to be added on for IV fluids and pain medicine    Background:   Patient's last infusion was 10/8/24  Last clinic visit date: 8/15/24 w/ Patricia Mantilla  Does patient have active treatment plan?  Yes    Assessment of Symptoms:  Onset/Duration of symptoms: 1 day- started last night     Is it typical sickle cell pain? Yes   Location: lower back  Character: Dull ache           Intensity: 7/10    Any radiation of pain, numbness, tingling, weakness, warmth, swelling, discoloration of arms or legs?No     Fever? No    Chest Pain Present: No     Shortness of breath: No     Other home therapies tried: HEAT/HEATING PAD and WARM BATH     Last home medication taken and when: 0630 Oxycodone and IBU    Any Refills Needed?: No     Does patient have transportation & length of time to get to clinic: No     Recommendations:   Pt has Jr infusion, wondering if she can add on IV pain meds.   0738 secure chat to Patricia Mantilla for provider approval, okayed by Patricia to add on IV pain meds.   0741 call to [facility], spoke w/ charge Dee Dee, who states they will do Jr and add on pain meds today. Okay for provider to update infusion if IVF are deemed necessary.   Pt updated and encouraged to drink as much as she can PO. Pt voiced understanding.     Please note, if you are late for your appt, you risk losing your infusion appt as it may delay another patient's infusion who arrived on time.

## 2024-10-11 NOTE — NURSING NOTE
"Oncology Rooming Note    October 11, 2024 10:34 AM   Jennifer Cervantes is a 24 year old female who presents for:    Chief Complaint   Patient presents with    Port Draw     Labs drawn via port by RN in lab.  VS taken    Oncology Clinic Visit     RTN Sickle cell anemia     Initial Vitals: /73   Pulse 82   Temp 98.3  F (36.8  C) (Oral)   Resp 16   Wt 71 kg (156 lb 9.6 oz)   SpO2 97%   BMI 26.88 kg/m   Estimated body mass index is 26.88 kg/m  as calculated from the following:    Height as of 9/15/24: 1.626 m (5' 4\").    Weight as of this encounter: 71 kg (156 lb 9.6 oz). Body surface area is 1.79 meters squared.  Severe Pain (6) Comment: Data Unavailable   No LMP recorded. Patient has had an implant.  Allergies reviewed: Yes  Medications reviewed: Yes    Medications: Medication refills not needed today.  Pharmacy name entered into [email or web address]:    [facility] [place], [place] - [address] [place] [address] [phone]  [facility] - [place], [place] - [address] [place] [address]    Frailty Screening:   Is the patient here for a new oncology consult visit in cancer care? 2. No      Clinical concerns: Request a refill or a new Epi Pen as the one that currently has is old.       Ayesha Longoria             "

## 2024-10-11 NOTE — Clinical Note
10/11/2024      Jennifer Cervantes  [address]  [facility] [address]      Dear Colleague,    Thank you for referring your patient, Jennifer Cervantes, to the [facility]. Please see a copy of my visit note below.    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 She had multiple thromboembolic events in 2021 despite adherent anticoagulation use (though warfarin was perpetually low) and there are concerns for chronic thromboembolic disease but did not have pulmonary HTN on a November 2021 cath. She is maintained on chronic PO opioids and twice-weekly infusion visits (since 1/24/22) but has been able to be maintained on this regimen and has stayed out of the ED most of the time with even rarer admissions for most of 2023.     Interval History:  Jennifer is seen for routine hematology follow-up today.  Things have been going well at home recently.  She has been getting along with her mother although is cautious on her approach due to her history of volatile family dynamics.  Her sister recently sought care for substance abuse and is now clean.  She has a 2-month-old daughter who is under the care of of both Claire and her mother.  She has been very busy with care of the baby and with her nephew.  Her brother also recently sprained his ankle so she has been busy caring for him as well.  She has been diligent in trying to avoid going to the ED for pain treatment.  She feels proud of herself with her progress she has made.  She is trying to limit  oxycodone at home although still request weekly refills.  Today we reviewed her typical management of sickle cell pain at home.  Tylenol historically has been ineffective for her so she tends to lean toward ibuprofen which is more helpful if she takes this at the same time as her oxycodone.  She is able to go many days without any oxycodone doses.  She has a goal to continue to decrease her total oxycodone tablet count per refill and plans to lower this again during her next visit with Dr. Duncan on 10/21.    She saw Dr. Case with primary care on 9/30.  There is a plan to proceed with a CT of her abdomen/pelvis to further evaluate her ongoing nausea and vomiting.  She continues to vomit intermittently, usually every couple of days.  This is not necessarily linked to eating.  She has not done any stool studies for calprotectin or H. pylori yet but has everything  available at home to collect this.  She remains on Jadenu for iron chelation but states this is the only agent she has been taking recently.  She does not recall if she was on a separate agent previously but notes this is all that is been delivered to her with her most recent refills.    She is hopeful to look for a new job in the next few months.  She wants to prove to herself that she is able to stay out of the ED regularly before looking for work.      Sickle Cell Disease Comprehensive Checklist  Bone Health/Avascular Necrosis Screening/Symptoms (each visit): no new concerns today  Leg Ulcer evaluation (every visit): no  Hypertension (every visit):stable 11/3/23  Last pulmonary evaluation (asthma, AMAN, pulm HTN): 9/28/22, due for follow-up  Stroke/silent cerebral infarct Hx (Y/N): Yes TIA ~2014, first event ~age 1 with full stroke and R sided weakness  Last PCP Visit: 7/3/24 with Dr. Case  Vaccines:  PCV13: 5/13/19  Pneumovax (PPSV23): 3/04, 10/09, 7/12/19, due-will give today  Menactra: 4/2010, 9/2015 (MCV done 8/16/21)  MenB: 9/16/15,  5/13/19  Influenza: 10/2/23-due-will give today  Audiology (chelation): done 2/27/24    Comparison to Previous Audiogram dated 6/6/2022:     Pure Tone Thresholds 250-8000 Hz:    RIGHT: stable    LEFT: stable     High Frequency Audiometry 9,000-16,000Hz:     RIGHT: stable    LEFT: stable     Word Recognition Score:    RIGHT: stable    LEFT: stable    Plan last reviewed with patient: 10/2/23    Patient background: 24 yo F, enjoys movies and kids though there are times where she does not really want to talk to people. Does not have a lot of social support at home.     Sickle Cell Disease History  Primary Hematologist Team: Jose Rafael Duncan  PCP: none  Genotype: SS  Acute Pain Crisis Treatment: (limiting IV)   ER   Dilaudid 1 mg IV q1h up to 3 doses  Toradol 30 mg IV x1   Maintenance IV fluids with LR  Other: Zofran 8 mg IV PRN nausea  Inpatient:  PCA Dilaudid 1 hr q30 minutes, no basal rate  Toradol 30 mg x6h x 4 hr  LR maintenance x 1-2 days  Other Medications: Zofran  ASA  Supportive Care: Docusate, Senna  Chronic Pain Medications:    Home regimen: oxycodone 15 mg p.o. q.4-6 hours p.r.n. breakthrough pain.  She also continues on Voltaren gel, and Zoloft among other medications.    -Also benefits from mental health visits, acupuncture  Baseline Hemoglobin: 7 g/dl without chronic transfusions  Hydroxyurea use: Yes  H/O blood transfusions: Yes, several (iron overload) Most recent 11/20/2021  H/O Transfusion Reactions: no  Antibodies:none  H/O Acute Chest Syndrome: Yes  Last episode:9/05/22 (previously 4/26/21, 10/2019)   ICU/intubation: not with 9/2022 admission  H/O Stroke: Yes (managed with chronic transfusions in the past, stopped late Spring 2020)  H/O VTE: Yes (2/2021)  H/O Cholecystectomy or Splenectomy: no  H/O Asthma, Pulm HTN, AVN, Leg Ulcers, Nephropathy, Retinopathy, etc: Iron overload, asthma, chronic lung disease, physical limitations from early stroke    ---------------------------------------  Jennifer I  Billy's Goals (reviewed today 5/24/24)    1-3 month goal:  Continue to look for a job    6 month goal:      12 month goal:      Disease-specific goal(s):  Decrease frequency of ED visits. Decrease opioid qty per weekly refill. She'd like to get down to 5 tablets per week by October      Current Outpatient Medications   Medication Sig Dispense Refill    albuterol (PROAIR HFA/PROVENTIL HFA/VENTOLIN HFA) 108 (90 Base) MCG/ACT inhaler Inhale 2 puffs into the lungs every 6 hours as needed for shortness of breath or wheezing 8.5 g 3    albuterol (PROVENTIL) (2.5 MG/3ML) 0.083% neb solution Take 2 vials (5 mg) by nebulization every 6 hours as needed for shortness of breath or wheezing 90 mL 3    aspirin (ASA) 81 MG chewable tablet Take 1 tablet (81 mg) by mouth 2 times daily 60 tablet 11    budesonide-formoterol (SYMBICORT) 160-4.5 MCG/ACT Inhaler Inhale 2 puffs twice daily plus 1-2 puffs as needed. May use up to 12 puffs per day. 20.4 g 11    deferasirox (JADENU) 360 MG tablet TAKE 4 TABLETS (1,440 MG) BY MOUTH EVERY EVENING. 120 tablet 1    diphenhydrAMINE (BENADRYL) 50 MG capsule Take 1 capsule (50 mg) by mouth every 6 hours as needed for itching or allergies. Administer 12  hours pre - IV contrast injection and patient must have a . 1 capsule 0    EPINEPHrine (ANY BX GENERIC EQUIV) 0.3 MG/0.3ML injection 2-pack Inject 0.3 mLs (0.3 mg) into the muscle as needed for anaphylaxis 1 each 1    gabapentin (NEURONTIN) 300 MG capsule Take 1 capsule (300 mg) by mouth 3 times daily. Take PO 1 pill in evening (300 mg) TID to start. If tolerated, increase by 300 mg in morning or lunch every few days, updating your doctor's office in time to get refills. The maximum dose is 900 mg TID. 90 capsule 1    hydroxyurea (HYDREA) 500 MG capsule TAKE 6 CAPSULES (3,000 MG) BY MOUTH ONCE DAILY. (Patient taking differently: Take 3,000 mg by mouth daily) 180 capsule 3    ibuprofen (ADVIL/MOTRIN) 800 MG tablet Take 800 mg by mouth every 8  hours as needed for moderate pain      melatonin 5 MG tablet Take 1 tablet (5 mg) by mouth nightly as needed for sleep 30 tablet 1    methocarbamol (ROBAXIN) 750 MG tablet Take 1 tablet (750 mg) by mouth 4 times daily as needed for muscle spasms (during sickle pain crises. Okay to take scheduled while in pain) 60 tablet 1    methylPREDNISolone (MEDROL) 32 MG tablet Take 1 tablet (32 mg) by mouth daily. 2 hours prior to the procedure with IV contrast 1 tablet 0    naloxone (NARCAN) 4 MG/0.1ML nasal spray Spray 4 mg into one nostril alternating nostrils as needed for opioid reversal. every 2-3 minutes until assistance arrives 0.2 mL 0    naloxone (NARCAN) 4 MG/0.1ML nasal spray Spray 4 mg into one nostril alternating nostrils as needed for opioid reversal. every 2-3 minutes until assistance arrives      omeprazole (PRILOSEC) 20 MG DR capsule Take 1 capsule (20 mg) by mouth daily 30 capsule 0    ondansetron (ZOFRAN ODT) 4 MG ODT tab Take 1 tablet (4 mg) by mouth every 6 hours as needed for nausea or vomiting. 10 tablet 0    ondansetron (ZOFRAN ODT) 8 MG ODT tab Take 1 tablet (8 mg) by mouth every 8 hours as needed for nausea 40 tablet 4    oxyCODONE IR (ROXICODONE) 10 MG tablet Take 1 tablet (10 mg) by mouth every 4 hours as needed for severe pain or breakthrough pain. Goal 4 per day. Max 6 per day. 15 tablet 0     Past Medical History  Past Medical History:   Diagnosis Date    Anxiety     Bleeding disorder (H)     Blood clotting disorder (H)     Cerebral infarction (H) 2015    Cognitive developmental delay     low IQ. 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 side (H)     right hand contractures    Iron overload due to repeated red blood cell transfusions     Migraines     Multiple subsegmental pulmonary emboli without acute cor pulmonale (H) 02/01/2021    Oppositional defiant behavior      Presence of intrauterine contraceptive device 2/18/2020    Superficial venous thrombosis of arm, right 03/25/2021    Uncomplicated asthma      Past Surgical History:   Procedure Laterality Date    AS INSERT TUNNELED CV 2 CATH W/O PORT/PUMP      CHOLECYSTECTOMY      CV RIGHT HEART CATH MEASUREMENTS RECORDED N/A 11/18/2021    Procedure: Right Heart Cath;  Surgeon: Jackson Stauffer MD;  Location:  HEART CARDIAC CATH LAB    INSERT PORT VASCULAR ACCESS Left 4/21/2021    Procedure: INSERTION, VASCULAR ACCESS PORT (NOT SURE ON SIDE UNTIL REMOVAL);  Surgeon: Rajan More MD;  Location: [facility] OR    IR CHEST PORT PLACEMENT > 5 YRS OF AGE  4/21/2021    IR CVC NON TUNNEL LINE REMOVAL  5/6/2021    IR CVC NON TUNNEL PLACEMENT > 5 YRS  04/07/2020    IR CVC NON TUNNEL PLACEMENT > 5 YRS  4/30/2021    IR CVC NON TUNNEL PLACEMENT > 5 YRS  9/7/2022    IR PORT REMOVAL LEFT  4/21/2021    REMOVE PORT VASCULAR ACCESS Left 4/21/2021    Procedure: REMOVAL, VASCULAR ACCESS PORT LEFT;  Surgeon: Rajan More MD;  Location: [facility] OR    REPAIR TENDON ELBOW Right 10/02/2019    Procedure: Right Elbow Flexor Lengthening, Flexor Pronator Slide Of Wrist and Finger, Thumb Adductor Lengthening;  Surgeon: Anai Franco MD;  Location: [facility] OR    TONSILLECTOMY Bilateral 10/02/2019    Procedure: Bilateral Tonsillectomy;  Surgeon: Farhana Guy MD;  Location: [facility] OR    ZZC BREAST REDUCTION (INCLUDES LIPO) TIER 3 Bilateral 04/23/2019     Allergies   Allergen Reactions    Contrast Dye Angioedema     Hives and breathing issues    Fish-Derived Products Hives    Seafood Hives    Adhesive Tape Hives     Primipore dressing causes hives    Gadolinium     Iodinated Contrast Media      Social History   Social History     Tobacco Use    Smoking status: Never     Passive exposure: Never    Smokeless tobacco: Never   Substance Use Topics    Alcohol use: Not Currently     Alcohol/week: 0.0 standard drinks of alcohol    Drug use: Never   Her mom  lives next door to her.  Past medical history and social history were reviewed.    Physical Examination:  /73   Pulse 82   Temp 98.3  F (36.8  C) (Oral)   Resp 16   Wt 71 kg (156 lb 9.6 oz)   SpO2 97%   BMI 26.88 kg/m        Wt Readings from Last 10 Encounters:   10/11/24 71 kg (156 lb 9.6 oz)   10/04/24 68.9 kg (152 lb)   09/26/24 69.4 kg (152 lb 14.4 oz)   09/15/24 70.3 kg (155 lb)   09/13/24 70.7 kg (155 lb 14.4 oz)   09/10/24 70 kg (154 lb 6.4 oz)   08/31/24 68.5 kg (151 lb)   08/29/24 68 kg (150 lb)   08/24/24 65.8 kg (145 lb)   08/17/24 74.9 kg (165 lb 2 oz)     General: Pleasant female, NAD  Eyes: EOMI, PERRL  Respiratory: Normal effort, no adventitious breath sounds  Ext: no peripheral edema  Neurologic: Grossly nonfocal. A/O x 4.  Skin: No rashes, petechiae, or bruising noted on exposed skin.   Laboratory Data:  Most Recent 3 CBC's:  Recent Labs   Lab Test 10/11/24  1024 10/04/24  1553 09/30/24  0932   WBC 11.6* 11.5* 11.6*   HGB 7.7* 9.0* 7.1*   MCV 86 88 87   * 476* 463*   ANEUTAUTO 8.2 8.0 7.6    Most Recent 3 BMP's:  Recent Labs   Lab Test 10/04/24  1553 09/20/24  1554 09/15/24  0623 09/13/24  1228 09/10/24  2341    138 141   < > 136   POTASSIUM 4.1 3.9 4.2   < > 3.9   CHLORIDE 106 106 109*   < > 104   CO2 24 21* 22   < > 22   BUN 9.5 7.3 10.3   < > 6.3   CR 0.62 0.54 0.56   < > 0.63   ANIONGAP 10 11 10   < > 10   MICAH 9.3 9.2 8.9   < > 8.8   GLC 95 86 86   < > 91   PROTTOTAL 7.5 7.8  --   --  7.8   ALBUMIN 4.7 4.8  --   --  4.5    < > = values in this interval not displayed.    Most Recent 2 LFT's:  Recent Labs   Lab Test 10/04/24  1553 09/20/24  1554   AST 45 50*   ALT 28 31   ALKPHOS 60 59   BILITOTAL 3.1* 4.1*    Most Recent TSH and T4:  Recent Labs   Lab Test 04/23/24  1754   TSH 0.60     Phos/Mag:  Lab Results   Component Value Date    PHOS 4.8 (H) 05/13/2023    PHOS 5.0 (H) 05/12/2023    PHOS 3.6 02/21/2021    MAG 2.0 05/16/2024    MAG 1.7 04/29/2024    MAG 1.9 04/28/2024       I reviewed the above labs today.    Assessment and Plan:  1. Sickle Cell HgbSS Disease  2. Acute pain crises  3. Chronic Pain  4. 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 History of CVA  7. Hearing loss  8. Nausea/vomiting       Jennifer is seen today for routine follow-up and monthly crizanlizumab infusion.  She is continue to struggle with intermittent abdominal pain, nausea and vomiting.  She has now connected with Dr. Case and primary care and feels positive about adding him to her care team.  There is a plan to further evaluate her abdominal symptoms with a CT of the abdomen/pelvis next week.  Due to her contrast allergy she was prescribed steroids and Benadryl to use for premedication. Dr. Case had ordered stool studies for calprotectin and H. pylori which she has not yet completed.  I reminded her of this today and she will attempt to submit a sample next week.  She still has an endoscopy appointment scheduled in November which I encouraged her to keep.    Regarding her sickle cell management, she continues to make an effort to reduce her oral oxycodone use at home.  Over the past few weeks she has been requesting refills about 3 to 4 days early.  We discussed this today and I reviewed that there is no deliberate intention to use her oxycodone within 1 week's time.  I encouraged her to try to extend the length of her prescriptions for greater than 1 week explaining that this is an alternative way to decrease her tablet use on her own terms.  Most recently she has been receiving 15 tablets and using them over an 8-day period.  When she sees Dr. Duncan on 10/21 she plans to further decrease her total quantity to 10 tablets.    I encouraged her to continue to attempt to decrease her ED visits as able.  For now we are continuing to limit her infusion visits at 2 times per week.  In the future we we will attempt to decrease this further although need  to maintain a stepwise approach to reducing her opioid use.    There is some confusion over her iron chelation.  She remains on Jadenu and is taking this regularly.  Deferiprone was added to her regimen earlier this year although she states she has not received this medication for a few months now.  She is due for repeat for a scan next month which I will request today.      -------------------------------------  Plan:  -Continue Hydrea to 3000mg daily to help lessen frequency of sickle cell pain.   -Continue monthly crizanlizumab infusion  -Continue slow taper of oxycodone. Currently receiving oxycodone 10 mg every 4 hours PRN, qty 15. Plan to tapering to qty 10 on Monday 10/21.    -She can self-reduce infusion days/week and we will continue to keep the cap at 2/week for now.   -Continue infusion center visits limited to two times per week (Mondays and Fridays ideally although still needs to call to request). Continue diligent home management with current medications, heat, rest, compression, warm baths.   -If unable to manage at home can go to ED  with continued plan to use IV Dilaudid and take a break from ketamine (10/2/23). No PCA use and goal for any admission would still be to discharge by 5 days or less  -EGD for evaluation of ongoing n/v and abdominal pain, scheduled in November.  CT abdomen/pelvis next week as ordered by Dr. Case.  Encourage completion of stool studies for calprotectin and H. pylori.  -Continue metoclopramide 5 mg TID PRN  - Continue Jadenu for iron overload.  Will revisit need for an additional chelation agent following her next ferriscan.  -Continue aspirin BID  -She is due for both pneumococcal boosters and an influenza vaccine today and is agreeable to both.  -RTC 10/21 with DR. Duncan, 3 week with me per her request    Patricia Mantilla CNP  ---  65 minutes spent on the date of the encounter doing chart review, review of test results, interpretation of tests, patient visit, and  documentation.    The longitudinal plan of care for the diagnosis(es)/condition(s) as documented were addressed during this visit. Due to the added complexity in care, I will continue to support Jennifer in the subsequent management and with ongoing continuity of care.                    Again, thank you for allowing me to participate in the care of your patient.        Sincerely,        Patricia Mantilla CNP

## 2024-10-11 NOTE — PROGRESS NOTES
Infusion Nursing Note:  Jennifer Cervantes presents today for Crizanlizumab-tmca, IV Dilaudid, Pneumovax vaccine, Influenza Vaccine.    Patient seen by provider today: Yes: Patricia Mantilla CNP   present during visit today: Not Applicable.    Note: Patient arrives to infusion rating lower back pain at 8/10.  No IVF available today due to national fluid shortage but patient able to receive 3 dose of Dilaudid, Toradol, Zofran and Crizanlizumab today.  Prior to discharge patient able to rate her pain at 4/10.      Intravenous Access:  Implanted Port.    Treatment Conditions:  Lab Results   Component Value Date    HGB 7.7 (L) 10/11/2024    WBC 11.6 (H) 10/11/2024    ANEU 8.1 09/18/2024    ANEUTAUTO 8.2 10/11/2024     (H) 10/11/2024        Lab Results   Component Value Date     10/11/2024    POTASSIUM 3.6 10/11/2024    MAG 2.0 05/16/2024    CR 0.49 (L) 10/11/2024    MICAH 9.1 10/11/2024    BILITOTAL 3.1 (H) 10/04/2024    ALBUMIN 4.7 10/04/2024    ALT 28 10/04/2024    AST 45 10/04/2024         Post Infusion Assessment:  Patient tolerated infusion without incident.  Patient tolerated one Pneumoniax injection without incident to right arm and one Influenza vaccine to left arm without issues..  Blood return noted pre and post infusion.  Site patent and intact, free from redness, edema or discomfort.  No evidence of extravasations.  Access discontinued per protocol.       Discharge Plan:   Patient declined prescription refills.  Discharge instructions reviewed with: Patient.  Patient and/or family verbalized understanding of discharge instructions and all questions answered.  AVS to patient via [employer].  Patient will return 10/21/24 for labs and to see Dr. Duncan for next appointment.   Patient discharged in stable condition accompanied by: self.  Departure Mode: Ambulatory.      Arely Brown RN

## 2024-10-11 NOTE — PROGRESS NOTES
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 She had multiple thromboembolic events in 2021 despite adherent anticoagulation use (though warfarin was perpetually low) and there are concerns for chronic thromboembolic disease but did not have pulmonary HTN on a November 2021 cath. She is maintained on chronic PO opioids and twice-weekly infusion visits (since 1/24/22) but has been able to be maintained on this regimen and has stayed out of the ED most of the time with even rarer admissions for most of 2023.     Interval History:  Jennifer is seen for routine hematology follow-up today.  Things have been going well at home recently.  She has been getting along with her mother although is cautious on her approach due to her history of volatile family dynamics.  Her sister recently sought care for substance abuse and is now clean.  She has a 2-month-old daughter who is under the care of of both Claire and her mother.  She has been very busy with care of the baby and with her nephew.  Her brother also recently sprained his ankle so she has been busy caring for him as well.  She has been diligent in trying to avoid going to the ED for pain treatment.  She feels proud of herself with her progress she has made.  She is trying to limit oxycodone at home although still request weekly refills.  Today we reviewed her typical management of sickle cell pain at home.  Tylenol historically has been ineffective for her so she tends to lean toward ibuprofen which is more helpful if she  takes this at the same time as her oxycodone.  She is able to go many days without any oxycodone doses.  She has a goal to continue to decrease her total oxycodone tablet count per refill and plans to lower this again during her next visit with Dr. Duncan on 10/21.    She saw Dr. Case with primary care on 9/30.  There is a plan to proceed with a CT of her abdomen/pelvis to further evaluate her ongoing nausea and vomiting.  She continues to vomit intermittently, usually every couple of days.  This is not necessarily linked to eating.  She has not done any stool studies for calprotectin or H. pylori yet but has everything  available at home to collect this.  She remains on Jadenu for iron chelation but states this is the only agent she has been taking recently.  She does not recall if she was on a separate agent previously but notes this is all that is been delivered to her with her most recent refills.    She is hopeful to look for a new job in the next few months.  She wants to prove to herself that she is able to stay out of the ED regularly before looking for work.      Sickle Cell Disease Comprehensive Checklist  Bone Health/Avascular Necrosis Screening/Symptoms (each visit): no new concerns today  Leg Ulcer evaluation (every visit): no  Hypertension (every visit):stable 11/3/23  Last pulmonary evaluation (asthma, AMAN, pulm HTN): 9/28/22, due for follow-up  Stroke/silent cerebral infarct Hx (Y/N): Yes TIA ~2014, first event ~age 1 with full stroke and R sided weakness  Last PCP Visit: 7/3/24 with Dr. Case  Vaccines:  PCV13: 5/13/19  Pneumovax (PPSV23): 3/04, 10/09, 7/12/19, due-will give today  Menactra: 4/2010, 9/2015 (MCV done 8/16/21)  MenB: 9/16/15, 5/13/19  Influenza: 10/2/23-due-will give today  Audiology (chelation): done 2/27/24    Comparison to Previous Audiogram dated 6/6/2022:     Pure Tone Thresholds 250-8000 Hz:    RIGHT: stable    LEFT: stable     High Frequency Audiometry 9,000-16,000Hz:      RIGHT: stable    LEFT: stable     Word Recognition Score:    RIGHT: stable    LEFT: stable    Plan last reviewed with patient: 10/2/23    Patient background: 22 yo F, enjoys movies and kids though there are times where she does not really want to talk to people. Does not have a lot of social support at home.     Sickle Cell Disease History  Primary Hematologist Team: Jose Rafael Duncan  PCP: none  Genotype: SS  Acute Pain Crisis Treatment: (limiting IV)   ER   Dilaudid 1 mg IV q1h up to 3 doses  Toradol 30 mg IV x1   Maintenance IV fluids with LR  Other: Zofran 8 mg IV PRN nausea  Inpatient:  PCA Dilaudid 1 hr q30 minutes, no basal rate  Toradol 30 mg x6h x 4 hr  LR maintenance x 1-2 days  Other Medications: Zofran  ASA  Supportive Care: Docusate, Senna  Chronic Pain Medications:    Home regimen: oxycodone 15 mg p.o. q.4-6 hours p.r.n. breakthrough pain.  She also continues on Voltaren gel, and Zoloft among other medications.    -Also benefits from mental health visits, acupuncture  Baseline Hemoglobin: 7 g/dl without chronic transfusions  Hydroxyurea use: Yes  H/O blood transfusions: Yes, several (iron overload) Most recent 11/20/2021  H/O Transfusion Reactions: no  Antibodies:none  H/O Acute Chest Syndrome: Yes  Last episode:9/05/22 (previously 4/26/21, 10/2019)   ICU/intubation: not with 9/2022 admission  H/O Stroke: Yes (managed with chronic transfusions in the past, stopped late Spring 2020)  H/O VTE: Yes (2/2021)  H/O Cholecystectomy or Splenectomy: no  H/O Asthma, Pulm HTN, AVN, Leg Ulcers, Nephropathy, Retinopathy, etc: Iron overload, asthma, chronic lung disease, physical limitations from early stroke    ---------------------------------------  Jennifer Cervantes's Goals (reviewed today 5/24/24)    1-3 month goal:  Continue to look for a job    6 month goal:      12 month goal:      Disease-specific goal(s):  Decrease frequency of ED visits. Decrease opioid qty per weekly refill. She'd like to get down to 5 tablets  per week by October      Current Outpatient Medications   Medication Sig Dispense Refill    albuterol (PROAIR HFA/PROVENTIL HFA/VENTOLIN HFA) 108 (90 Base) MCG/ACT inhaler Inhale 2 puffs into the lungs every 6 hours as needed for shortness of breath or wheezing 8.5 g 3    albuterol (PROVENTIL) (2.5 MG/3ML) 0.083% neb solution Take 2 vials (5 mg) by nebulization every 6 hours as needed for shortness of breath or wheezing 90 mL 3    aspirin (ASA) 81 MG chewable tablet Take 1 tablet (81 mg) by mouth 2 times daily 60 tablet 11    budesonide-formoterol (SYMBICORT) 160-4.5 MCG/ACT Inhaler Inhale 2 puffs twice daily plus 1-2 puffs as needed. May use up to 12 puffs per day. 20.4 g 11    deferasirox (JADENU) 360 MG tablet TAKE 4 TABLETS (1,440 MG) BY MOUTH EVERY EVENING. 120 tablet 1    diphenhydrAMINE (BENADRYL) 50 MG capsule Take 1 capsule (50 mg) by mouth every 6 hours as needed for itching or allergies. Administer 12  hours pre - IV contrast injection and patient must have a . 1 capsule 0    EPINEPHrine (ANY BX GENERIC EQUIV) 0.3 MG/0.3ML injection 2-pack Inject 0.3 mLs (0.3 mg) into the muscle as needed for anaphylaxis 1 each 1    gabapentin (NEURONTIN) 300 MG capsule Take 1 capsule (300 mg) by mouth 3 times daily. Take PO 1 pill in evening (300 mg) TID to start. If tolerated, increase by 300 mg in morning or lunch every few days, updating your doctor's office in time to get refills. The maximum dose is 900 mg TID. 90 capsule 1    hydroxyurea (HYDREA) 500 MG capsule TAKE 6 CAPSULES (3,000 MG) BY MOUTH ONCE DAILY. (Patient taking differently: Take 3,000 mg by mouth daily) 180 capsule 3    ibuprofen (ADVIL/MOTRIN) 800 MG tablet Take 800 mg by mouth every 8 hours as needed for moderate pain      melatonin 5 MG tablet Take 1 tablet (5 mg) by mouth nightly as needed for sleep 30 tablet 1    methocarbamol (ROBAXIN) 750 MG tablet Take 1 tablet (750 mg) by mouth 4 times daily as needed for muscle spasms (during sickle  pain crises. Okay to take scheduled while in pain) 60 tablet 1    methylPREDNISolone (MEDROL) 32 MG tablet Take 1 tablet (32 mg) by mouth daily. 2 hours prior to the procedure with IV contrast 1 tablet 0    naloxone (NARCAN) 4 MG/0.1ML nasal spray Spray 4 mg into one nostril alternating nostrils as needed for opioid reversal. every 2-3 minutes until assistance arrives 0.2 mL 0    naloxone (NARCAN) 4 MG/0.1ML nasal spray Spray 4 mg into one nostril alternating nostrils as needed for opioid reversal. every 2-3 minutes until assistance arrives      omeprazole (PRILOSEC) 20 MG DR capsule Take 1 capsule (20 mg) by mouth daily 30 capsule 0    ondansetron (ZOFRAN ODT) 4 MG ODT tab Take 1 tablet (4 mg) by mouth every 6 hours as needed for nausea or vomiting. 10 tablet 0    ondansetron (ZOFRAN ODT) 8 MG ODT tab Take 1 tablet (8 mg) by mouth every 8 hours as needed for nausea 40 tablet 4    oxyCODONE IR (ROXICODONE) 10 MG tablet Take 1 tablet (10 mg) by mouth every 4 hours as needed for severe pain or breakthrough pain. Goal 4 per day. Max 6 per day. 15 tablet 0     Past Medical History  Past Medical History:   Diagnosis Date    Anxiety     Bleeding disorder (H)     Blood clotting disorder (H)     Cerebral infarction (H) 2015    Cognitive developmental delay     low IQ. 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 side (H)     right hand contractures    Iron overload due to repeated red blood cell transfusions     Migraines     Multiple subsegmental pulmonary emboli without acute cor pulmonale (H) 02/01/2021    Oppositional defiant behavior     Presence of intrauterine contraceptive device 2/18/2020    Superficial venous thrombosis of arm, right 03/25/2021    Uncomplicated asthma      Past Surgical History:   Procedure Laterality Date    AS INSERT TUNNELED CV 2 CATH W/O PORT/PUMP      CHOLECYSTECTOMY       CV RIGHT HEART CATH MEASUREMENTS RECORDED N/A 11/18/2021    Procedure: Right Heart Cath;  Surgeon: Jackson Stauffer MD;  Location:  HEART CARDI[facility] CATH LAB    INSERT PORT VASCULAR ACCESS Left 4/21/2021    Procedure: INSERTION, VASCULAR ACCESS PORT (NOT SURE ON SIDE UNTIL REMOVAL);  Surgeon: Rajan More MD;  Location: [facility] OR    IR CHEST PORT PLACEMENT > 5 YRS OF AGE  4/21/2021    IR CVC NON TUNNEL LINE REMOVAL  5/6/2021    IR CVC NON TUNNEL PLACEMENT > 5 YRS  04/07/2020    IR CVC NON TUNNEL PLACEMENT > 5 YRS  4/30/2021    IR CVC NON TUNNEL PLACEMENT > 5 YRS  9/7/2022    IR PORT REMOVAL LEFT  4/21/2021    REMOVE PORT VASCULAR ACCESS Left 4/21/2021    Procedure: REMOVAL, VASCULAR ACCESS PORT LEFT;  Surgeon: Rajan More MD;  Location: [facility] OR    REPAIR TENDON ELBOW Right 10/02/2019    Procedure: Right Elbow Flexor Lengthening, Flexor Pronator Slide Of Wrist and Finger, Thumb Adductor Lengthening;  Surgeon: Anai Franco MD;  Location: [facility] OR    TONSILLECTOMY Bilateral 10/02/2019    Procedure: Bilateral Tonsillectomy;  Surgeon: Farhana Guy MD;  Location: [facility] OR    ZZC BREAST REDUCTION (INCLUDES LIPO) TIER 3 Bilateral 04/23/2019     Allergies   Allergen Reactions    Contrast Dye Angioedema     Hives and breathing issues    Fish-Derived Products Hives    Seafood Hives    Adhesive Tape Hives     Primipore dressing causes hives    Gadolinium     Iodinated Contrast Media      Social History   Social History     Tobacco Use    Smoking status: Never     Passive exposure: Never    Smokeless tobacco: Never   Substance Use Topics    Alcohol use: Not Currently     Alcohol/week: 0.0 standard drinks of alcohol    Drug use: Never   Her mom lives next door to her.  Past medical history and social history were reviewed.    Physical Examination:  /73   Pulse 82   Temp 98.3  F (36.8  C) (Oral)   Resp 16   Wt 71 kg (156 lb 9.6 oz)   SpO2 97%   BMI 26.88 kg/m        Wt Readings from Last 10  Encounters:   10/11/24 71 kg (156 lb 9.6 oz)   10/04/24 68.9 kg (152 lb)   09/26/24 69.4 kg (152 lb 14.4 oz)   09/15/24 70.3 kg (155 lb)   09/13/24 70.7 kg (155 lb 14.4 oz)   09/10/24 70 kg (154 lb 6.4 oz)   08/31/24 68.5 kg (151 lb)   08/29/24 68 kg (150 lb)   08/24/24 65.8 kg (145 lb)   08/17/24 74.9 kg (165 lb 2 oz)     General: Pleasant female, NAD  Eyes: EOMI, PERRL  Respiratory: Normal effort, no adventitious breath sounds  Ext: no peripheral edema  Neurologic: Grossly nonfocal. A/O x 4.  Skin: No rashes, petechiae, or bruising noted on exposed skin.   Laboratory Data:  Most Recent 3 CBC's:  Recent Labs   Lab Test 10/11/24  1024 10/04/24  1553 09/30/24  0932   WBC 11.6* 11.5* 11.6*   HGB 7.7* 9.0* 7.1*   MCV 86 88 87   * 476* 463*   ANEUTAUTO 8.2 8.0 7.6    Most Recent 3 BMP's:  Recent Labs   Lab Test 10/04/24  1553 09/20/24  1554 09/15/24  0623 09/13/24  1228 09/10/24  2341    138 141   < > 136   POTASSIUM 4.1 3.9 4.2   < > 3.9   CHLORIDE 106 106 109*   < > 104   CO2 24 21* 22   < > 22   BUN 9.5 7.3 10.3   < > 6.3   CR 0.62 0.54 0.56   < > 0.63   ANIONGAP 10 11 10   < > 10   MICAH 9.3 9.2 8.9   < > 8.8   GLC 95 86 86   < > 91   PROTTOTAL 7.5 7.8  --   --  7.8   ALBUMIN 4.7 4.8  --   --  4.5    < > = values in this interval not displayed.    Most Recent 2 LFT's:  Recent Labs   Lab Test 10/04/24  1553 09/20/24  1554   AST 45 50*   ALT 28 31   ALKPHOS 60 59   BILITOTAL 3.1* 4.1*    Most Recent TSH and T4:  Recent Labs   Lab Test 04/23/24  1754   TSH 0.60     Phos/Mag:  Lab Results   Component Value Date    PHOS 4.8 (H) 05/13/2023    PHOS 5.0 (H) 05/12/2023    PHOS 3.6 02/21/2021    MAG 2.0 05/16/2024    MAG 1.7 04/29/2024    MAG 1.9 04/28/2024      I reviewed the above labs today.    Assessment and Plan:  1. Sickle Cell HgbSS Disease  2. Acute pain crises  3. Chronic Pain  4. 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 History of CVA  7. Hearing  loss  8. Nausea/vomiting       Jennifer is seen today for routine follow-up and monthly crizanlizumab infusion.  She is continue to struggle with intermittent abdominal pain, nausea and vomiting.  She has now connected with Dr. Case and primary care and feels positive about adding him to her care team.  There is a plan to further evaluate her abdominal symptoms with a CT of the abdomen/pelvis next week.  Due to her contrast allergy she was prescribed steroids and Benadryl to use for premedication. Dr. Case had ordered stool studies for calprotectin and H. pylori which she has not yet completed.  I reminded her of this today and she will attempt to submit a sample next week.  She still has an endoscopy appointment scheduled in November which I encouraged her to keep.    Regarding her sickle cell management, she continues to make an effort to reduce her oral oxycodone use at home.  Over the past few weeks she has been requesting refills about 3 to 4 days early.  We discussed this today and I reviewed that there is no deliberate intention to use her oxycodone within 1 week's time.  I encouraged her to try to extend the length of her prescriptions for greater than 1 week explaining that this is an alternative way to decrease her tablet use on her own terms.  Most recently she has been receiving 15 tablets and using them over an 8-day period.  When she sees Dr. Duncan on 10/21 she plans to further decrease her total quantity to 10 tablets.    I encouraged her to continue to attempt to decrease her ED visits as able.  For now we are continuing to limit her infusion visits at 2 times per week.  In the future we we will attempt to decrease this further although need to maintain a stepwise approach to reducing her opioid use.    There is some confusion over her iron chelation.  She remains on Jadenu and is taking this regularly.  Deferiprone was added to her regimen earlier this year although she states she has not  received this medication for a few months now.  She is due for repeat for a scan next month which I will request today.      -------------------------------------  Plan:  -Continue Hydrea to 3000mg daily to help lessen frequency of sickle cell pain.   -Continue monthly crizanlizumab infusion  -Continue slow taper of oxycodone. Currently receiving oxycodone 10 mg every 4 hours PRN, qty 15. Plan to tapering to qty 10 on Monday 10/21.    -She can self-reduce infusion days/week and we will continue to keep the cap at 2/week for now.   -Continue infusion center visits limited to two times per week (Mondays and Fridays ideally although still needs to call to request). Continue diligent home management with current medications, heat, rest, compression, warm baths.   -If unable to manage at home can go to ED  with continued plan to use IV Dilaudid and take a break from ketamine (10/2/23). No PCA use and goal for any admission would still be to discharge by 5 days or less  -EGD for evaluation of ongoing n/v and abdominal pain, scheduled in November.  CT abdomen/pelvis next week as ordered by Dr. Case.  Encourage completion of stool studies for calprotectin and H. pylori.  -Continue metoclopramide 5 mg TID PRN  - Continue Jadenu for iron overload.  Will revisit need for an additional chelation agent following her next ferriscan.  -Continue aspirin BID  -She is due for both pneumococcal boosters and an influenza vaccine today and is agreeable to both.  -RTC 10/21 with DR. Duncan, 3 week with me per her request    Patricia Mantilla CNP  ---  65 minutes spent on the date of the encounter doing chart review, review of test results, interpretation of tests, patient visit, and documentation.    The longitudinal plan of care for the diagnosis(es)/condition(s) as documented were addressed during this visit. Due to the added complexity in care, I will continue to support Jennifer in the subsequent management and with ongoing continuity  of care.

## 2024-10-12 ENCOUNTER — HOSPITAL ENCOUNTER (EMERGENCY)
Facility: CLINIC | Age: 24
Discharge: HOME | End: 2024-10-13
Attending: EMERGENCY MEDICINE | Admitting: EMERGENCY MEDICINE
Payer: COMMERCIAL

## 2024-10-12 DIAGNOSIS — D57.00 SICKLE CELL PAIN CRISIS (H): ICD-10-CM

## 2024-10-12 DIAGNOSIS — D72.829 LEUKOCYTOSIS, UNSPECIFIED TYPE: ICD-10-CM

## 2024-10-12 DIAGNOSIS — T50.Z95A VACCINE REACTION, INITIAL ENCOUNTER: ICD-10-CM

## 2024-10-12 LAB
ANION GAP SERPL CALCULATED.3IONS-SCNC: 11 MMOL/L (ref 7–15)
BASOPHILS # BLD AUTO: 0.1 10E3/UL (ref 0–0.2)
BASOPHILS # BLD MANUAL: 0 10E3/UL (ref 0–0.2)
BASOPHILS NFR BLD AUTO: 0 %
BASOPHILS NFR BLD MANUAL: 0 %
BUN SERPL-MCNC: 6.9 MG/DL (ref 6–20)
CALCIUM SERPL-MCNC: 8.3 MG/DL (ref 8.8–10.4)
CHLORIDE SERPL-SCNC: 102 MMOL/L (ref 98–107)
CREAT SERPL-MCNC: 0.5 MG/DL (ref 0.51–0.95)
EGFRCR SERPLBLD CKD-EPI 2021: >90 ML/MIN/1.73M2
EOSINOPHIL # BLD AUTO: 0.1 10E3/UL (ref 0–0.7)
EOSINOPHIL # BLD MANUAL: 0.2 10E3/UL (ref 0–0.7)
EOSINOPHIL NFR BLD AUTO: 0 %
EOSINOPHIL NFR BLD MANUAL: 1 %
ERYTHROCYTE [DISTWIDTH] IN BLOOD BY AUTOMATED COUNT: 21.2 % (ref 10–15)
GLUCOSE SERPL-MCNC: 94 MG/DL (ref 70–99)
HCO3 SERPL-SCNC: 22 MMOL/L (ref 22–29)
HCT VFR BLD AUTO: 20.6 % (ref 35–47)
HGB BLD-MCNC: 7.1 G/DL (ref 11.7–15.7)
IMM GRANULOCYTES # BLD: 0.1 10E3/UL
IMM GRANULOCYTES NFR BLD: 1 %
LYMPHOCYTES # BLD AUTO: 1.9 10E3/UL (ref 0.8–5.3)
LYMPHOCYTES # BLD MANUAL: 2.7 10E3/UL (ref 0.8–5.3)
LYMPHOCYTES NFR BLD AUTO: 10 %
LYMPHOCYTES NFR BLD MANUAL: 14 %
MCH RBC QN AUTO: 30.3 PG (ref 26.5–33)
MCHC RBC AUTO-ENTMCNC: 34.5 G/DL (ref 31.5–36.5)
MCV RBC AUTO: 88 FL (ref 78–100)
MONOCYTES # BLD AUTO: 1.8 10E3/UL (ref 0–1.3)
MONOCYTES # BLD MANUAL: 0.7 10E3/UL (ref 0–1.3)
MONOCYTES NFR BLD AUTO: 9 %
MONOCYTES NFR BLD MANUAL: 4 %
NEUTROPHILS # BLD AUTO: 15.7 10E3/UL (ref 1.6–8.3)
NEUTROPHILS # BLD MANUAL: 16.1 10E3/UL (ref 1.6–8.3)
NEUTROPHILS NFR BLD AUTO: 80 %
NEUTROPHILS NFR BLD MANUAL: 82 %
NRBC # BLD AUTO: 0.1 10E3/UL
NRBC BLD AUTO-RTO: 1 /100
PLAT MORPH BLD: ABNORMAL
PLATELET # BLD AUTO: 469 10E3/UL (ref 150–450)
POTASSIUM SERPL-SCNC: 3.6 MMOL/L (ref 3.4–5.3)
RBC # BLD AUTO: 2.34 10E6/UL (ref 3.8–5.2)
RBC MORPH BLD: ABNORMAL
RETICS # AUTO: 0.39 10E6/UL (ref 0.03–0.1)
RETICS/RBC NFR AUTO: 16.6 % (ref 0.5–2)
SICKLE CELLS BLD QL SMEAR: ABNORMAL
SODIUM SERPL-SCNC: 135 MMOL/L (ref 135–145)
TARGETS BLD QL SMEAR: SLIGHT
WBC # BLD AUTO: 19.8 10E3/UL (ref 4–11)

## 2024-10-12 PROCEDURE — 99285 EMERGENCY DEPT VISIT HI MDM: CPT | Performed by: EMERGENCY MEDICINE

## 2024-10-12 PROCEDURE — 96376 TX/PRO/DX INJ SAME DRUG ADON: CPT | Performed by: EMERGENCY MEDICINE

## 2024-10-12 PROCEDURE — 85025 COMPLETE CBC W/AUTO DIFF WBC: CPT | Performed by: EMERGENCY MEDICINE

## 2024-10-12 PROCEDURE — 96375 TX/PRO/DX INJ NEW DRUG ADDON: CPT | Performed by: EMERGENCY MEDICINE

## 2024-10-12 PROCEDURE — 96361 HYDRATE IV INFUSION ADD-ON: CPT | Performed by: EMERGENCY MEDICINE

## 2024-10-12 PROCEDURE — 80048 BASIC METABOLIC PNL TOTAL CA: CPT | Performed by: EMERGENCY MEDICINE

## 2024-10-12 PROCEDURE — 85007 BL SMEAR W/DIFF WBC COUNT: CPT | Performed by: EMERGENCY MEDICINE

## 2024-10-12 PROCEDURE — 99284 EMERGENCY DEPT VISIT MOD MDM: CPT | Mod: 25 | Performed by: EMERGENCY MEDICINE

## 2024-10-12 PROCEDURE — 85045 AUTOMATED RETICULOCYTE COUNT: CPT | Performed by: EMERGENCY MEDICINE

## 2024-10-12 PROCEDURE — 258N000003 HC RX IP 258 OP 636: Performed by: EMERGENCY MEDICINE

## 2024-10-12 PROCEDURE — 250N000011 HC RX IP 250 OP 636: Performed by: EMERGENCY MEDICINE

## 2024-10-12 PROCEDURE — 82310 ASSAY OF CALCIUM: CPT | Performed by: EMERGENCY MEDICINE

## 2024-10-12 PROCEDURE — 36415 COLL VENOUS BLD VENIPUNCTURE: CPT | Performed by: EMERGENCY MEDICINE

## 2024-10-12 PROCEDURE — 96374 THER/PROPH/DIAG INJ IV PUSH: CPT | Performed by: EMERGENCY MEDICINE

## 2024-10-12 RX ORDER — KETOROLAC TROMETHAMINE 15 MG/ML
15 INJECTION, SOLUTION INTRAMUSCULAR; INTRAVENOUS ONCE
Status: COMPLETED | OUTPATIENT
Start: 2024-10-12 | End: 2024-10-12

## 2024-10-12 RX ORDER — HEPARIN SODIUM (PORCINE) LOCK FLUSH IV SOLN 100 UNIT/ML 100 UNIT/ML
100 SOLUTION INTRAVENOUS ONCE
Status: COMPLETED | OUTPATIENT
Start: 2024-10-12 | End: 2024-10-13

## 2024-10-12 RX ADMIN — HYDROMORPHONE HYDROCHLORIDE 1 MG: 1 INJECTION, SOLUTION INTRAMUSCULAR; INTRAVENOUS; SUBCUTANEOUS at 21:06

## 2024-10-12 RX ADMIN — KETOROLAC TROMETHAMINE 15 MG: 15 INJECTION, SOLUTION INTRAMUSCULAR; INTRAVENOUS at 21:06

## 2024-10-12 RX ADMIN — HYDROMORPHONE HYDROCHLORIDE 1 MG: 1 INJECTION, SOLUTION INTRAMUSCULAR; INTRAVENOUS; SUBCUTANEOUS at 23:23

## 2024-10-12 RX ADMIN — SODIUM CHLORIDE, POTASSIUM CHLORIDE, SODIUM LACTATE AND CALCIUM CHLORIDE 1000 ML: 600; 310; 30; 20 INJECTION, SOLUTION INTRAVENOUS at 21:06

## 2024-10-12 RX ADMIN — HYDROMORPHONE HYDROCHLORIDE 1 MG: 1 INJECTION, SOLUTION INTRAMUSCULAR; INTRAVENOUS; SUBCUTANEOUS at 22:22

## 2024-10-12 ASSESSMENT — ACTIVITIES OF DAILY LIVING (ADL)
ADLS_ACUITY_SCORE: 37

## 2024-10-13 VITALS
DIASTOLIC BLOOD PRESSURE: 68 MMHG | SYSTOLIC BLOOD PRESSURE: 122 MMHG | HEART RATE: 106 BPM | OXYGEN SATURATION: 95 % | TEMPERATURE: 99.9 F | HEIGHT: 64 IN | BODY MASS INDEX: 26.46 KG/M2 | WEIGHT: 155 LBS | RESPIRATION RATE: 18 BRPM

## 2024-10-13 PROCEDURE — 250N000011 HC RX IP 250 OP 636: Performed by: EMERGENCY MEDICINE

## 2024-10-13 RX ADMIN — HEPARIN 100 UNITS: 100 SYRINGE at 00:34

## 2024-10-13 NOTE — DISCHARGE INSTRUCTIONS
Please make an appointment to follow up with Your Primary Care Provider in 2 days unless symptoms completely resolve.  If your symptoms worsen prior to your follow-up appointment please come back to the emergency department.

## 2024-10-13 NOTE — ED TRIAGE NOTES
"Pt presents today with complaints of a reaction she believes is related to the flu vaccine which she received yesterday. Pt states she had a fever of 103 F at home, which she treated with ibuprofen prior top coming to the ED. Pt endorses feeling fatigued and feels she may also be experiencing a sickle cell crisis. Pt states\" After getting the  vaccine my body just started burning. I took a warm shower and that didn't help.\"     Triage Assessment (Adult)       Row Name 10/12/24 2023          Triage Assessment    Airway WDL WDL        Respiratory WDL    Respiratory WDL WDL        Cardiac WDL    Cardiac WDL WDL        Peripheral/Neurovascular WDL    Peripheral Neurovascular WDL WDL        Cognitive/Neuro/Behavioral WDL    Cognitive/Neuro/Behavioral WDL WDL                     "

## 2024-10-13 NOTE — ED NOTES
Pt discharge to home by cab. Port-a-cath de-accessed with no event. Discharged education and instructions given, pt stated understanding. All questions and concerns addressed.

## 2024-10-13 NOTE — ED PROVIDER NOTES
"    [facility] EMERGENCY DEPARTMENT ([facility])    10/12/24      ED PROVIDER NOTE    History     Chief Complaint   Patient presents with    Sickle Cell Pain Crisis    Medication Reaction     Pt presents today with complaints of a reaction she believes is related to the flu vaccine which she received yesterday. Pt states she had a fever of 103 F at home, which she treated with ibuprofen prior top coming to the ED. Pt endorses feeling fatigued and feels she may also be experiencing a sickle cell crisis. Pt states\" After getting the  vaccine my body just started burning. I took a warm shower and that didn't help.\"     HPI  Jennifer Cervantes is a 24 year old female with history of sickle cell anemia, asthma, prior cerebral infarction, prior PE, who presents to the ED with complaints of sickle cell pain and adverse reaction to vaccination. Patient reports having influenza and pneumovax vaccinations yesterday. Later at home she began experiencing a whole body burning sensation, fatigue. She also reports swelling and pain along her collarbones. She also reports fevers with measured Tmax of 103 at home. Today she reports she has only eaten once, has otherwise been sleeping most the day and feeling ill. She feels like her sickle cell pain began to flare up after adverse reaction began. Denies cough. She reports taking ibuprofen around 1200 today.      Past Medical History  Past Medical History:   Diagnosis Date    Anxiety     Bleeding disorder (H)     Blood clotting disorder (H)     Cerebral infarction (H) 2015    Cognitive developmental delay     low IQ. 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 side (H)     right hand contractures    Iron overload due to repeated red blood cell transfusions     Migraines     Multiple subsegmental pulmonary emboli without acute cor pulmonale (H) 02/01/2021    " Oppositional defiant behavior     Presence of intrauterine contraceptive device 2/18/2020    Superficial venous thrombosis of arm, right 03/25/2021    Uncomplicated asthma      Past Surgical History:   Procedure Laterality Date    AS INSERT TUNNELED CV 2 CATH W/O PORT/PUMP      CHOLECYSTECTOMY      CV RIGHT HEART CATH MEASUREMENTS RECORDED N/A 11/18/2021    Procedure: Right Heart Cath;  Surgeon: Jackson Stauffer MD;  Location:  HEART CARDIAC CATH LAB    INSERT PORT VASCULAR ACCESS Left 4/21/2021    Procedure: INSERTION, VASCULAR ACCESS PORT (NOT SURE ON SIDE UNTIL REMOVAL);  Surgeon: Rajan More MD;  Location: [facility] OR    IR CHEST PORT PLACEMENT > 5 YRS OF AGE  4/21/2021    IR CVC NON TUNNEL LINE REMOVAL  5/6/2021    IR CVC NON TUNNEL PLACEMENT > 5 YRS  04/07/2020    IR CVC NON TUNNEL PLACEMENT > 5 YRS  4/30/2021    IR CVC NON TUNNEL PLACEMENT > 5 YRS  9/7/2022    IR PORT REMOVAL LEFT  4/21/2021    REMOVE PORT VASCULAR ACCESS Left 4/21/2021    Procedure: REMOVAL, VASCULAR ACCESS PORT LEFT;  Surgeon: Rajan More MD;  Location: [facility] OR    REPAIR TENDON ELBOW Right 10/02/2019    Procedure: Right Elbow Flexor Lengthening, Flexor Pronator Slide Of Wrist and Finger, Thumb Adductor Lengthening;  Surgeon: Anai Franco MD;  Location: [facility] OR    TONSILLECTOMY Bilateral 10/02/2019    Procedure: Bilateral Tonsillectomy;  Surgeon: Farhana Guy MD;  Location: [facility] OR    ZZC BREAST REDUCTION (INCLUDES LIPO) TIER 3 Bilateral 04/23/2019     albuterol (PROAIR HFA/PROVENTIL HFA/VENTOLIN HFA) 108 (90 Base) MCG/ACT inhaler  albuterol (PROVENTIL) (2.5 MG/3ML) 0.083% neb solution  aspirin (ASA) 81 MG chewable tablet  budesonide-formoterol (SYMBICORT) 160-4.5 MCG/ACT Inhaler  deferasirox (JADENU) 360 MG tablet  diphenhydrAMINE (BENADRYL) 50 MG capsule  EPINEPHrine (ANY BX GENERIC EQUIV) 0.3 MG/0.3ML injection 2-pack  gabapentin (NEURONTIN) 300 MG capsule  hydroxyurea (HYDREA) 500 MG capsule  ibuprofen  "(ADVIL/MOTRIN) 800 MG tablet  melatonin 5 MG tablet  methocarbamol (ROBAXIN) 750 MG tablet  methylPREDNISolone (MEDROL) 32 MG tablet  naloxone (NARCAN) 4 MG/0.1ML nasal spray  naloxone (NARCAN) 4 MG/0.1ML nasal spray  omeprazole (PRILOSEC) 20 MG DR capsule  ondansetron (ZOFRAN ODT) 4 MG ODT tab  ondansetron (ZOFRAN ODT) 8 MG ODT tab  oxyCODONE IR (ROXICODONE) 10 MG tablet      Allergies   Allergen Reactions    Contrast Dye Angioedema     Hives and breathing issues    Fish-Derived Products Hives    Seafood Hives    Adhesive Tape Hives     Primipore dressing causes hives    Gadolinium     Iodinated Contrast Media      Family History  Family History   Problem Relation Age of Onset    Sickle Cell Trait Mother     Hypertension Mother     Asthma Mother     Sickle Cell Trait Father     Glaucoma No family hx of     Macular Degeneration No family hx of     Diabetes No family hx of     Gout No family hx of      Social History   Social History     Tobacco Use    Smoking status: Never     Passive exposure: Never    Smokeless tobacco: Never   Substance Use Topics    Alcohol use: Not Currently     Alcohol/week: 0.0 standard drinks of alcohol    Drug use: Never      Past medical history, past surgical history, medications, allergies, family history, and social history were reviewed with the patient. No additional pertinent items.     A medically appropriate review of systems was performed with pertinent positives and negatives noted in the HPI, and all other systems negative.    Physical Exam   BP: 120/65  Pulse: 120  Temp: 99.9  F (37.7  C)  Resp: 20  Height: 162.6 cm (5' 4\")  Weight: 70.3 kg (155 lb) (Pt stated)  SpO2: 100 %  Physical Exam  Vitals and nursing note reviewed.   Constitutional:       General: She is not in acute distress.     Appearance: She is not ill-appearing, toxic-appearing or diaphoretic.   HENT:      Head: Normocephalic and atraumatic.   Eyes:      Extraocular Movements: Extraocular movements intact.      " Conjunctiva/sclera: Conjunctivae normal.   Cardiovascular:      Rate and Rhythm: Tachycardia present.      Heart sounds: Normal heart sounds.   Pulmonary:      Effort: Pulmonary effort is normal. No respiratory distress.      Breath sounds: Normal breath sounds. No wheezing, rhonchi or rales.   Chest:      Chest wall: No tenderness.   Abdominal:      Palpations: Abdomen is soft.      Tenderness: There is no abdominal tenderness.   Musculoskeletal:         General: No tenderness. Normal range of motion.      Cervical back: Normal range of motion and neck supple.      Right lower leg: No edema.      Left lower leg: No edema.   Skin:     General: Skin is warm.      Findings: No erythema or rash.   Neurological:      General: No focal deficit present.      Mental Status: She is alert and oriented to person, place, and time.   Psychiatric:         Mood and Affect: Mood normal.         Behavior: Behavior normal.         Thought Content: Thought content normal.         Judgment: Judgment normal.           ED Course, Procedures, & Data      Procedures                Results for orders placed or performed during the hospital encounter of 10/12/24   Basic metabolic panel     Status: Abnormal   Result Value Ref Range    Sodium 135 135 - 145 mmol/L    Potassium 3.6 3.4 - 5.3 mmol/L    Chloride 102 98 - 107 mmol/L    Carbon Dioxide (CO2) 22 22 - 29 mmol/L    Anion Gap 11 7 - 15 mmol/L    Urea Nitrogen 6.9 6.0 - 20.0 mg/dL    Creatinine 0.50 (L) 0.51 - 0.95 mg/dL    GFR Estimate >90 >60 mL/min/1.73m2    Calcium 8.3 (L) 8.8 - 10.4 mg/dL    Glucose 94 70 - 99 mg/dL   Reticulocyte count     Status: Abnormal   Result Value Ref Range    % Reticulocyte 16.6 (H) 0.5 - 2.0 %    Absolute Reticulocyte 0.388 (H) 0.025 - 0.095 10e6/uL   CBC with platelets and differential     Status: Abnormal   Result Value Ref Range    WBC Count 19.8 (H) 4.0 - 11.0 10e3/uL    RBC Count 2.34 (L) 3.80 - 5.20 10e6/uL    Hemoglobin 7.1 (L) 11.7 - 15.7 g/dL     Hematocrit 20.6 (L) 35.0 - 47.0 %    MCV 88 78 - 100 fL    MCH 30.3 26.5 - 33.0 pg    MCHC 34.5 31.5 - 36.5 g/dL    RDW 21.2 (H) 10.0 - 15.0 %    Platelet Count 469 (H) 150 - 450 10e3/uL    % Neutrophils 80 %    % Lymphocytes 10 %    % Monocytes 9 %    % Eosinophils 0 %    % Basophils 0 %    % Immature Granulocytes 1 %    NRBCs per 100 WBC 1 (H) <1 /100    Absolute Neutrophils 15.7 (H) 1.6 - 8.3 10e3/uL    Absolute Lymphocytes 1.9 0.8 - 5.3 10e3/uL    Absolute Monocytes 1.8 (H) 0.0 - 1.3 10e3/uL    Absolute Eosinophils 0.1 0.0 - 0.7 10e3/uL    Absolute Basophils 0.1 0.0 - 0.2 10e3/uL    Absolute Immature Granulocytes 0.1 <=0.4 10e3/uL    Absolute NRBCs 0.1 10e3/uL   Manual Differential     Status: Abnormal   Result Value Ref Range    % Neutrophils 82 %    % Lymphocytes 14 %    % Monocytes 4 %    % Eosinophils 1 %    % Basophils 0 %    Absolute Neutrophils 16.1 (H) 1.6 - 8.3 10e3/uL    Absolute Lymphocytes 2.7 0.8 - 5.3 10e3/uL    Absolute Monocytes 0.7 0.0 - 1.3 10e3/uL    Absolute Eosinophils 0.2 0.0 - 0.7 10e3/uL    Absolute Basophils 0.0 0.0 - 0.2 10e3/uL    RBC Morphology Confirmed RBC Indices     Platelet Assessment  Automated Count Confirmed. Platelet morphology is normal.     Automated Count Confirmed. Platelet morphology is normal.    Sickle Cells Moderate (A) None Seen    Target Cells Slight (A) None Seen   CBC with platelets differential     Status: Abnormal    Narrative    The following orders were created for panel order CBC with platelets differential.  Procedure                               Abnormality         Status                     ---------                               -----------         ------                     CBC with platelets and d...[[id number]]  Abnormal            Final result               Manual Differential[[id number]]          Abnormal            Final result                 Please view results for these tests on the individual orders.     Medications   lactated ringers BOLUS 1,000  mL (0 mLs Intravenous Stopped 10/12/24 2243)   HYDROmorphone (DILAUDID) injection 1 mg (1 mg Intravenous $Given 10/12/24 2106)   ketorolac (TORADOL) injection 15 mg (15 mg Intravenous $Given 10/12/24 2106)   HYDROmorphone (DILAUDID) injection 1 mg (1 mg Intravenous $Given 10/12/24 2222)   HYDROmorphone (DILAUDID) injection 1 mg (1 mg Intravenous $Given 10/12/24 2323)     Labs Ordered and Resulted from Time of ED Arrival to Time of ED Departure   BASIC METABOLIC PANEL - Abnormal       Result Value    Sodium 135      Potassium 3.6      Chloride 102      Carbon Dioxide (CO2) 22      Anion Gap 11      Urea Nitrogen 6.9      Creatinine 0.50 (*)     GFR Estimate >90      Calcium 8.3 (*)     Glucose 94     RETICULOCYTE COUNT - Abnormal    % Reticulocyte 16.6 (*)     Absolute Reticulocyte 0.388 (*)    CBC WITH PLATELETS AND DIFFERENTIAL - Abnormal    WBC Count 19.8 (*)     RBC Count 2.34 (*)     Hemoglobin 7.1 (*)     Hematocrit 20.6 (*)     MCV 88      MCH 30.3      MCHC 34.5      RDW 21.2 (*)     Platelet Count 469 (*)     % Neutrophils 80      % Lymphocytes 10      % Monocytes 9      % Eosinophils 0      % Basophils 0      % Immature Granulocytes 1      NRBCs per 100 WBC 1 (*)     Absolute Neutrophils 15.7 (*)     Absolute Lymphocytes 1.9      Absolute Monocytes 1.8 (*)     Absolute Eosinophils 0.1      Absolute Basophils 0.1      Absolute Immature Granulocytes 0.1      Absolute NRBCs 0.1     DIFFERENTIAL - Abnormal    % Neutrophils 82      % Lymphocytes 14      % Monocytes 4      % Eosinophils 1      % Basophils 0      Absolute Neutrophils 16.1 (*)     Absolute Lymphocytes 2.7      Absolute Monocytes 0.7      Absolute Eosinophils 0.2      Absolute Basophils 0.0      RBC Morphology Confirmed RBC Indices      Platelet Assessment        Value: Automated Count Confirmed. Platelet morphology is normal.    Sickle Cells Moderate (*)     Target Cells Slight (*)      No orders to display          Critical care was not performed.      Medical Decision Making  The patient's presentation was of high complexity (an acute health issue posing potential threat to life or bodily function).    The patient's evaluation involved:  review of external note(s) from 1 sources (see separate area of note for details)  review of 3+ test result(s) ordered prior to this encounter (see separate area of note for details)  strong consideration of a test (see separate area of note for details) that was ultimately deferred  ordering and/or review of 3+ test(s) in this encounter (see separate area of note for details)    The patient's management necessitated high risk (a decision regarding hospitalization).    Assessment & Plan    This is a 24 year old sickle cell disease patient presenting with bodyaches and chills/fevers since last night after receiving 2 vaccinations yesterday afternoon.  Differential diagnosis: Sickle cell pain crisis, vaccine reaction, electrolyte abnormalities.    After thorough history and physical exam, patient appears to be in no acute distress.  No red flags for acute chest syndrome.  I will hydrate her with bolus of IV lactated Ringer's and treat her pain with IV Toradol and IV Dilaudid per her care plan.  She agrees with the plan.    Patient's laboratory studies returned with evidence of leukocytosis, WBC is 19,800. There is evidence of anemia, hemoglobin is 7.1, which is close to patient's baseline.  Electrolytes show no evidence of dehydration, creatinine is slightly low 0.5.  Reticulocyte count is 16.6% and I do not suspect the patient has aplastic crisis.  Her symptoms have improved after 3 doses of IV Dilaudid and a bolus of IV lactated Ringer's at this point she is asking to be discharged.  Her symptoms could be due to reaction to the vaccines she received yesterday.  Her pulmonary exam was normal and I highly doubt acute chest syndrome.  She has no cough, chest pain, or hypoxemia.  She is stable for discharge with outpatient  follow-up.  She agrees with this plan and she also agrees to return if her symptoms worsen.    I have reviewed the nursing notes. I have reviewed the findings, diagnosis, plan and need for follow up with the patient.    New Prescriptions    No medications on file       Final diagnoses:   Sickle cell pain crisis (H)   Vaccine reaction, initial encounter   Philip NEWMAN, am serving as a trained medical scribe to document services personally performed by Claire Perez MD MD based on the provider's statements to me on October 12, 2024.  This document has been checked and approved by the attending provider.    IAna Nikola, MD MD, was physically present and have reviewed and verified the accuracy of this note documented by Philip Diamond medical scribe.      Claire Perez MD MD  [facility] EMERGENCY DEPARTMENT  10/12/2024     Claire Perez MD  10/12/24 7001

## 2024-10-14 ENCOUNTER — INFUSION THERAPY VISIT (OUTPATIENT)
Dept: INFUSION THERAPY | Facility: CLINIC | Age: 24
End: 2024-10-14
Attending: PEDIATRICS
Payer: COMMERCIAL

## 2024-10-14 ENCOUNTER — NURSE TRIAGE (OUTPATIENT)
Dept: ONCOLOGY | Facility: CLINIC | Age: 24
End: 2024-10-14
Payer: COMMERCIAL

## 2024-10-14 VITALS
OXYGEN SATURATION: 100 % | HEART RATE: 97 BPM | RESPIRATION RATE: 16 BRPM | SYSTOLIC BLOOD PRESSURE: 108 MMHG | DIASTOLIC BLOOD PRESSURE: 71 MMHG | TEMPERATURE: 98 F

## 2024-10-14 DIAGNOSIS — D57.00 SICKLE CELL PAIN CRISIS (H): ICD-10-CM

## 2024-10-14 DIAGNOSIS — D57.00 SICKLE CELL PAIN CRISIS (H): Primary | ICD-10-CM

## 2024-10-14 DIAGNOSIS — G81.10 SPASTIC HEMIPLEGIA, UNSPECIFIED ETIOLOGY, UNSPECIFIED LATERALITY (H): ICD-10-CM

## 2024-10-14 PROCEDURE — 258N000003 HC RX IP 258 OP 636: Performed by: PEDIATRICS

## 2024-10-14 PROCEDURE — 96374 THER/PROPH/DIAG INJ IV PUSH: CPT

## 2024-10-14 PROCEDURE — 96361 HYDRATE IV INFUSION ADD-ON: CPT

## 2024-10-14 PROCEDURE — 96375 TX/PRO/DX INJ NEW DRUG ADDON: CPT

## 2024-10-14 PROCEDURE — 250N000013 HC RX MED GY IP 250 OP 250 PS 637: Performed by: PEDIATRICS

## 2024-10-14 PROCEDURE — 96376 TX/PRO/DX INJ SAME DRUG ADON: CPT

## 2024-10-14 PROCEDURE — 250N000011 HC RX IP 250 OP 636: Performed by: PEDIATRICS

## 2024-10-14 RX ORDER — HEPARIN SODIUM,PORCINE 10 UNIT/ML
5 VIAL (ML) INTRAVENOUS
Status: CANCELLED | OUTPATIENT
Start: 2025-01-01

## 2024-10-14 RX ORDER — ONDANSETRON 8 MG/1
8 TABLET, FILM COATED ORAL
Status: CANCELLED
Start: 2025-01-01

## 2024-10-14 RX ORDER — HEPARIN SODIUM (PORCINE) LOCK FLUSH IV SOLN 100 UNIT/ML 100 UNIT/ML
5 SOLUTION INTRAVENOUS
Status: DISCONTINUED | OUTPATIENT
Start: 2024-10-14 | End: 2024-10-14 | Stop reason: HOSPADM

## 2024-10-14 RX ORDER — OXYCODONE HYDROCHLORIDE 10 MG/1
10 TABLET ORAL EVERY 4 HOURS PRN
Qty: 15 TABLET | Refills: 0 | Status: SHIPPED | OUTPATIENT
Start: 2024-10-14 | End: 2024-10-21

## 2024-10-14 RX ORDER — DIPHENHYDRAMINE HCL 25 MG
50 CAPSULE ORAL
Status: CANCELLED
Start: 2025-01-01

## 2024-10-14 RX ORDER — HEPARIN SODIUM (PORCINE) LOCK FLUSH IV SOLN 100 UNIT/ML 100 UNIT/ML
5 SOLUTION INTRAVENOUS
Status: CANCELLED | OUTPATIENT
Start: 2025-01-01

## 2024-10-14 RX ORDER — ONDANSETRON 2 MG/ML
8 INJECTION INTRAMUSCULAR; INTRAVENOUS EVERY 6 HOURS PRN
Status: CANCELLED
Start: 2025-01-01

## 2024-10-14 RX ORDER — KETOROLAC TROMETHAMINE 30 MG/ML
30 INJECTION, SOLUTION INTRAMUSCULAR; INTRAVENOUS ONCE
Status: COMPLETED | OUTPATIENT
Start: 2024-10-14 | End: 2024-10-14

## 2024-10-14 RX ORDER — DIPHENHYDRAMINE HCL 25 MG
50 CAPSULE ORAL
Status: COMPLETED | OUTPATIENT
Start: 2024-10-14 | End: 2024-10-14

## 2024-10-14 RX ORDER — ONDANSETRON 2 MG/ML
8 INJECTION INTRAMUSCULAR; INTRAVENOUS EVERY 6 HOURS PRN
Status: DISCONTINUED | OUTPATIENT
Start: 2024-10-14 | End: 2024-10-14 | Stop reason: HOSPADM

## 2024-10-14 RX ORDER — ONDANSETRON 8 MG/1
8 TABLET, FILM COATED ORAL
Status: DISCONTINUED | OUTPATIENT
Start: 2024-10-14 | End: 2024-10-14

## 2024-10-14 RX ORDER — HEPARIN SODIUM,PORCINE 10 UNIT/ML
5 VIAL (ML) INTRAVENOUS
Status: DISCONTINUED | OUTPATIENT
Start: 2024-10-14 | End: 2024-10-14 | Stop reason: HOSPADM

## 2024-10-14 RX ORDER — KETOROLAC TROMETHAMINE 30 MG/ML
30 INJECTION, SOLUTION INTRAMUSCULAR; INTRAVENOUS ONCE
Status: CANCELLED
Start: 2025-01-01 | End: 2025-01-01

## 2024-10-14 RX ADMIN — ONDANSETRON 8 MG: 2 INJECTION INTRAMUSCULAR; INTRAVENOUS at 11:30

## 2024-10-14 RX ADMIN — HYDROMORPHONE HYDROCHLORIDE 1 MG: 1 INJECTION, SOLUTION INTRAMUSCULAR; INTRAVENOUS; SUBCUTANEOUS at 13:32

## 2024-10-14 RX ADMIN — HYDROMORPHONE HYDROCHLORIDE 1 MG: 1 INJECTION, SOLUTION INTRAMUSCULAR; INTRAVENOUS; SUBCUTANEOUS at 11:28

## 2024-10-14 RX ADMIN — DIPHENHYDRAMINE HYDROCHLORIDE 50 MG: 25 CAPSULE ORAL at 11:30

## 2024-10-14 RX ADMIN — SODIUM CHLORIDE, POTASSIUM CHLORIDE, SODIUM LACTATE AND CALCIUM CHLORIDE 1000 ML: 600; 310; 30; 20 INJECTION, SOLUTION INTRAVENOUS at 11:37

## 2024-10-14 RX ADMIN — HYDROMORPHONE HYDROCHLORIDE 1 MG: 1 INJECTION, SOLUTION INTRAMUSCULAR; INTRAVENOUS; SUBCUTANEOUS at 12:27

## 2024-10-14 RX ADMIN — KETOROLAC TROMETHAMINE 30 MG: 30 INJECTION, SOLUTION INTRAMUSCULAR at 11:33

## 2024-10-14 RX ADMIN — HEPARIN 5 ML: 100 SYRINGE at 13:45

## 2024-10-14 NOTE — TELEPHONE ENCOUNTER
Narcotic Refill Request    Medication(s) requested:  oxycodone 10mg  Person Requesting Refill:Jennifer Cervantes  What pain is the medication treating: sickle cell pain  How is the medication being taken?:1 tablet every 4 hours with goal of max 6 per day, pt states since covid/flu vaccine has had to take more consistently   Does pt have enough for today? no  Is pain being adequately controlled on the current regimen?: requires IVF/Pain at times  Experiencing any side effects from medication?: denies    Date of most recent appointment:  10/11/2024 Patricia Mantilla CNP  Any No Show Visits:none recently  Next appointment:   10/21/2024 Dr. Duncan  Last fill date and by whom:  10/3/2024 Patricia Mantilla CNP   Reviewed: YES      Send to provider: Patricia Mantilla and RNCURRY Mcgee

## 2024-10-14 NOTE — TELEPHONE ENCOUNTER
Oncology Nurse Triage - Sickle Cell Pain Crisis:    Situation: Jennifer  calling about Sickle Cell Pain Crisis, requesting to be added on for IV fluids and pain medicine    Background:     Patient's last infusion was ED in Saturday/Sunday  Last clinic visit date:10/11/2024 Patricia Mantilla,   Does patient have active treatment plan?  Yes      Assessment of Symptoms:  Onset/Duration of symptoms: 2 day    Is it typical sickle cell pain? Yes   Location: back and stomach, left arm, right color bone  Character: Sharp           Intensity: 10/10    Any radiation of pain, numbness, tingling, weakness, warmth, swelling, discoloration of arms or legs?No     Fever?Received recent flu/COVID vaccine, Yes: was in ED for original fever of 103F, then in ED it was closer to 99F.   Max in past 24hours around 102F    Chest Pain Present: No     Shortness of breath: No     Other home therapies tried: HEAT/HEATING PAD and WARM BATH     Last home medication taken and when: 5:00am, oxycocone     Any Refills Needed?: Yes oxycodone, see separate refill encounter. Since pt has had increase pain and fever since FLU/COVID vaccine has been taking every 4 hours.     Does patient have transportation & length of time to get to clinic: No         Recommendations:   Pending paged Patricia mantilla CNP  0735 Per Patricia redday to come in for Pain,   Likely still on shortage for IVF.     0935 Appt available for 10:00am at [facility], Pt agrees to appt time, is currently at [address] trying to  prescription refills, and will be able to take [place] shuttle from [facility] [place] today for appt. Pt was instructed to ask help desk/info  for detailed shuttle info.     0945 Scheduling request sent to add on appt.     Please note, if you are late for your appt, you risk losing your infusion appt as it may delay another patient's infusion who arrived on time.

## 2024-10-14 NOTE — PROGRESS NOTES
Infusion Nursing Note:  Jennifer Cervantes presents today for IV fluids and pain meds.    Patient seen by provider today: No   present during visit today: Not Applicable.    Note: Pt reporting pain 8/10 upon arrival. Says her pain has been increased since getting 2 vaccines several days ago. Reports pain that shoots up the right side of her neck from her shoulder whenever she coughs. Pain 4/10 at time of discharge.      Intravenous Access:  Implanted Port.    Treatment Conditions:  Not Applicable.      Post Infusion Assessment:  Patient tolerated infusion without incident.  Blood return noted pre and post infusion.  Site patent and intact, free from redness, edema or discomfort.  Access discontinued per protocol.       Discharge Plan:   Discharge instructions reviewed with: Patient.  Patient and/or family verbalized understanding of discharge instructions and all questions answered.  Patient discharged in stable condition accompanied by: self.  Departure Mode: Ambulatory.      Ruth Baird RN

## 2024-10-15 ENCOUNTER — VIRTUAL VISIT (OUTPATIENT)
Dept: PHARMACY | Facility: CLINIC | Age: 24
End: 2024-10-15
Payer: COMMERCIAL

## 2024-10-15 DIAGNOSIS — L29.9 ITCHING: ICD-10-CM

## 2024-10-15 DIAGNOSIS — E83.111 IRON OVERLOAD DUE TO REPEATED RED BLOOD CELL TRANSFUSIONS: ICD-10-CM

## 2024-10-15 DIAGNOSIS — J45.40 MODERATE PERSISTENT ASTHMA, UNSPECIFIED WHETHER COMPLICATED: ICD-10-CM

## 2024-10-15 DIAGNOSIS — D57.1 HB-SS DISEASE WITHOUT CRISIS (H): ICD-10-CM

## 2024-10-15 DIAGNOSIS — Z86.73 H/O: STROKE: Primary | ICD-10-CM

## 2024-10-15 DIAGNOSIS — T78.2XXS ANAPHYLAXIS, SEQUELA: ICD-10-CM

## 2024-10-15 DIAGNOSIS — D57.00 SICKLE CELL PAIN CRISIS (H): ICD-10-CM

## 2024-10-15 DIAGNOSIS — R11.2 NAUSEA AND VOMITING, UNSPECIFIED VOMITING TYPE: ICD-10-CM

## 2024-10-15 DIAGNOSIS — G47.00 INSOMNIA, UNSPECIFIED TYPE: ICD-10-CM

## 2024-10-15 PROCEDURE — 99605 MTMS BY PHARM NP 15 MIN: CPT | Mod: 93

## 2024-10-15 PROCEDURE — 99607 MTMS BY PHARM ADDL 15 MIN: CPT | Mod: 93

## 2024-10-15 RX ORDER — EPINEPHRINE 0.3 MG/.3ML
0.3 INJECTION SUBCUTANEOUS PRN
Qty: 1 EACH | Refills: 1 | Status: SHIPPED | OUTPATIENT
Start: 2024-10-15

## 2024-10-15 RX ORDER — BUDESONIDE AND FORMOTEROL FUMARATE DIHYDRATE 160; 4.5 UG/1; UG/1
AEROSOL RESPIRATORY (INHALATION)
Qty: 20.4 G | Refills: 11 | Status: SHIPPED | OUTPATIENT
Start: 2024-10-15

## 2024-10-15 NOTE — PROGRESS NOTES
Medication Therapy Management (MTM) Encounter    ASSESSMENT:                            Medication Adherence/Access: See below for considerations.    Asthma  Patient would benefit from using Symbicort twice daily and as needed for symptoms, instead of using albuterol as needed. This is preferred because using low-dose ICS-formoterol reduces the risk of severe exacerbations, especially when adherence is a concern.    History of Stroke  Patient would benefit from taking aspirin twice daily (as prescribed).    Sickle Cell Anemia  Stable.    Iron Overload due to Repeated RBC Transfusions  Stable.    Sickle Cell Pain Crisis  Stable.    Nausea  Stable.    Itching, Anaphylaxis  Stable, EpiPen refill sent.    Insomnia   Stable.    PLAN:                            Resume aspirin 81 mg twice daily  Use Symbicort inhaler twice daily AND as needed (stop using albuterol as needed).  I sent an EpiPen refill to your pharmacy    Follow-up: via phone on  Thursday Nov 14, 2024   Appt at 11:00 AM (30 min)  Telephone      SUBJECTIVE/OBJECTIVE:                          Jennifer Cervantes is a 24 year old female seen for an initial visit. She was referred to me from [facility].      Reason for visit: Medication review.    Allergies/ADRs: Reviewed in chart  Past Medical History: Reviewed in chart  Tobacco: She reports that she has never smoked. She has never been exposed to tobacco smoke. She has never used smokeless tobacco.  Alcohol: none    Medication Adherence/Access: See below for last fill dates    Asthma   Symbicort 160-4.5 mcg 2 puffs twice daily (last filled 2 inhalers on 02/09/2024)  Albuterol HFA 2 puffs every 6 hours as needed - uses once daily  Albuterol 0.083% 2 vials every 6 hours as needed -  uses more in the fall and winter  Patient rinses their mouth after using steroid inhaler.   Patient reports no current medication side effects.      Triggers include: strong odors and fumes and cold air.  Patient reports the  following symptoms: increased need of albuterol.         2/9/2024    12:38 PM 7/3/2024     8:40 AM 9/30/2024     1:06 PM   ACT Total Scores   ACT TOTAL SCORE (Goal Greater than or Equal to 20) 8 11 12   In the past 12 months, how many times did you visit the emergency room for your asthma without being admitted to the hospital? 3 3 2   In the past 12 months, how many times were you hospitalized overnight because of your asthma? 2 3 0     History of Stroke  Aspirin 81 mg twice daily - only taking once daily  No concerns for bruising or bleeding.  Is willing to start taking twice daily again.    Sickle Cell Anemia - Follows with Oncology  Hydroxyurea 3000 mg daily  Patient reports no current medication side effects.       Iron Overload due to Repeated RBC Transfusions   Jadenu 1440 mg daily  Does take on an empty stomach.  Patient reports no current medication side effects.       Chronic Pain (Sickle Cell Pain Crisis)  Oxycodone IR 10 mg every 4 hours as needed - takes 1-2 days per week  Narcan nasal spray as needed - has on hand, hasn't needed to use  Methocarbamol 750 mg 4 times daily as needed - takes once daily, no drowsiness  Ibuprofen 800 mg every 8 hours as needed - tries to limit, takes twice weekly with food  Gabapentin 300 mg 3 times daily - takes 600 mg at bedtime. Caused drowsiness during the day, even at lower doses.  Doesn't like taking a lot of medications to manage pain. Pain is pretty well managed with gabapentin and methocarbamol. If having breakthrough pain, will first take ibuprofen, followed by oxycodone if needed.    Nausea  Ondansetron ODT 8 mg every 8 hours as needed - takes a few times a week, is effective  Patient reports no current medication side effects.       Allergy (Itching, Anaphylaxis)  Diphenhydramine 50 mg every 6 hours as needed - takes twice weekly, is helpful for itching and sleep. No dry mouth, dry eyes, constipation, or urinary retention.   EpiPen 0.3 mg - Hasn't need to use,  but current pen is . Would like a new one.  Patient reports no current medication side effects.    Primary symptoms are itching.   Patient feels that current therapy is effective.      Insomnia   Melatonin 5 mg at bedtime   Patient reports no issues at this time.      Today's Vitals: There were no vitals taken for this visit.  ----------------    I spent 52 minutes with this patient today. All changes were made via collaborative practice agreement with Suraj Case MD. A copy of the visit note was provided to the patient's provider(s).    A summary of these recommendations was sent via [facility].    Telemedicine Visit Details  The patient's medications can be safely assessed via a telemedicine encounter.  Type of service:  Telephone visit  Originating Location (pt. Location): Home  Distant Location (provider location):  On-site  Start Time:  11:00 AM  End Time:  11:52 AM    Gail Louis PharmD  Medication Therapy Management Pharmacist      Medication Therapy Recommendations  History of stroke    Current Medication: aspirin (ASA) 81 MG chewable tablet   Rationale: Does not understand instructions - Adherence - Adherence   Recommendation: Provide Adherence Intervention   Status: Accepted - no CPA Needed         Moderate persistent asthma without complication    Current Medication: albuterol (PROAIR HFA/PROVENTIL HFA/VENTOLIN HFA) 108 (90 Base) MCG/ACT inhaler (Discontinued)   Rationale: More effective medication available - Ineffective medication - Effectiveness   Recommendation: Change Medication - Symbicort 160-4.5 MCG/ACT Aero   Status: Accepted per CPA   Note: Symbicort PRN instead of albuterol PRN

## 2024-10-16 ENCOUNTER — OFFICE VISIT (OUTPATIENT)
Dept: OPHTHALMOLOGY | Facility: CLINIC | Age: 24
End: 2024-10-16
Payer: COMMERCIAL

## 2024-10-16 ENCOUNTER — NURSE TRIAGE (OUTPATIENT)
Dept: ONCOLOGY | Facility: CLINIC | Age: 24
End: 2024-10-16

## 2024-10-16 ENCOUNTER — LAB (OUTPATIENT)
Dept: LAB | Facility: CLINIC | Age: 24
End: 2024-10-16
Attending: PEDIATRICS
Payer: COMMERCIAL

## 2024-10-16 VITALS
OXYGEN SATURATION: 100 % | HEART RATE: 95 BPM | RESPIRATION RATE: 16 BRPM | SYSTOLIC BLOOD PRESSURE: 114 MMHG | TEMPERATURE: 97.7 F | DIASTOLIC BLOOD PRESSURE: 66 MMHG

## 2024-10-16 DIAGNOSIS — E83.111 IRON OVERLOAD DUE TO REPEATED RED BLOOD CELL TRANSFUSIONS: ICD-10-CM

## 2024-10-16 DIAGNOSIS — R50.9 FEVER: Primary | ICD-10-CM

## 2024-10-16 DIAGNOSIS — D57.1 HB-SS DISEASE WITHOUT CRISIS (H): ICD-10-CM

## 2024-10-16 DIAGNOSIS — H35.412 LATTICE DEGENERATION OF RETINA, LEFT EYE: ICD-10-CM

## 2024-10-16 DIAGNOSIS — R50.9 FEVER: ICD-10-CM

## 2024-10-16 DIAGNOSIS — D57.1 SICKLE CELL DISEASE WITHOUT CRISIS (H): Primary | ICD-10-CM

## 2024-10-16 DIAGNOSIS — H52.4 ACCOMMODATIVE INSUFFICIENCY: ICD-10-CM

## 2024-10-16 LAB
ALBUMIN UR-MCNC: NEGATIVE MG/DL
APPEARANCE UR: CLEAR
BASOPHILS # BLD AUTO: 0.1 10E3/UL (ref 0–0.2)
BASOPHILS NFR BLD AUTO: 1 %
BILIRUB UR QL STRIP: NEGATIVE
COLOR UR AUTO: YELLOW
EOSINOPHIL # BLD AUTO: 0.2 10E3/UL (ref 0–0.7)
EOSINOPHIL NFR BLD AUTO: 1 %
ERYTHROCYTE [DISTWIDTH] IN BLOOD BY AUTOMATED COUNT: 20.1 % (ref 10–15)
GLUCOSE UR STRIP-MCNC: NEGATIVE MG/DL
HCT VFR BLD AUTO: 19.3 % (ref 35–47)
HGB BLD-MCNC: 6.6 G/DL (ref 11.7–15.7)
HGB UR QL STRIP: NEGATIVE
IMM GRANULOCYTES # BLD: 0.1 10E3/UL
IMM GRANULOCYTES NFR BLD: 0 %
KETONES UR STRIP-MCNC: NEGATIVE MG/DL
LEUKOCYTE ESTERASE UR QL STRIP: NEGATIVE
LYMPHOCYTES # BLD AUTO: 1.8 10E3/UL (ref 0.8–5.3)
LYMPHOCYTES NFR BLD AUTO: 13 %
MCH RBC QN AUTO: 29.3 PG (ref 26.5–33)
MCHC RBC AUTO-ENTMCNC: 34.2 G/DL (ref 31.5–36.5)
MCV RBC AUTO: 86 FL (ref 78–100)
MONOCYTES # BLD AUTO: 1 10E3/UL (ref 0–1.3)
MONOCYTES NFR BLD AUTO: 8 %
MUCOUS THREADS #/AREA URNS LPF: PRESENT /LPF
NEUTROPHILS # BLD AUTO: 10.3 10E3/UL (ref 1.6–8.3)
NEUTROPHILS NFR BLD AUTO: 77 %
NITRATE UR QL: NEGATIVE
NRBC # BLD AUTO: 0.1 10E3/UL
NRBC BLD AUTO-RTO: 0 /100
PH UR STRIP: 6.5 [PH] (ref 5–7)
PLATELET # BLD AUTO: 568 10E3/UL (ref 150–450)
RBC # BLD AUTO: 2.25 10E6/UL (ref 3.8–5.2)
RBC URINE: 0 /HPF
RETICS # AUTO: 0.22 10E6/UL (ref 0.03–0.1)
RETICS/RBC NFR AUTO: 9.6 % (ref 0.5–2)
SP GR UR STRIP: 1.01 (ref 1–1.03)
SQUAMOUS EPITHELIAL: <1 /HPF
UROBILINOGEN UR STRIP-MCNC: 2 MG/DL
WBC # BLD AUTO: 13.5 10E3/UL (ref 4–11)
WBC URINE: 1 /HPF

## 2024-10-16 PROCEDURE — 92015 DETERMINE REFRACTIVE STATE: CPT | Performed by: OPTOMETRIST

## 2024-10-16 PROCEDURE — 36591 DRAW BLOOD OFF VENOUS DEVICE: CPT

## 2024-10-16 PROCEDURE — 250N000011 HC RX IP 250 OP 636: Performed by: PEDIATRICS

## 2024-10-16 PROCEDURE — 92014 COMPRE OPH EXAM EST PT 1/>: CPT | Performed by: OPTOMETRIST

## 2024-10-16 PROCEDURE — 85045 AUTOMATED RETICULOCYTE COUNT: CPT

## 2024-10-16 PROCEDURE — 81003 URINALYSIS AUTO W/O SCOPE: CPT

## 2024-10-16 PROCEDURE — 85004 AUTOMATED DIFF WBC COUNT: CPT

## 2024-10-16 RX ORDER — HEPARIN SODIUM (PORCINE) LOCK FLUSH IV SOLN 100 UNIT/ML 100 UNIT/ML
5 SOLUTION INTRAVENOUS
Status: DISCONTINUED | OUTPATIENT
Start: 2024-10-16 | End: 2024-10-22 | Stop reason: HOSPADM

## 2024-10-16 RX ORDER — HYDROXYUREA 500 MG/1
3000 CAPSULE ORAL DAILY
Qty: 180 CAPSULE | Refills: 11 | Status: SHIPPED | OUTPATIENT
Start: 2024-10-16

## 2024-10-16 RX ORDER — DEFERASIROX 360 MG/1
TABLET, FILM COATED ORAL
Qty: 120 TABLET | Refills: 1 | Status: SHIPPED | OUTPATIENT
Start: 2024-10-16 | End: 2024-10-21

## 2024-10-16 RX ADMIN — Medication 5 ML: at 14:16

## 2024-10-16 ASSESSMENT — CONF VISUAL FIELD
OS_INFERIOR_NASAL_RESTRICTION: 0
OS_SUPERIOR_TEMPORAL_RESTRICTION: 0
METHOD: COUNTING FINGERS
OD_INFERIOR_NASAL_RESTRICTION: 0
OD_INFERIOR_TEMPORAL_RESTRICTION: 0
OS_SUPERIOR_NASAL_RESTRICTION: 0
OS_NORMAL: 1
OS_INFERIOR_TEMPORAL_RESTRICTION: 0
OD_SUPERIOR_NASAL_RESTRICTION: 0
OD_SUPERIOR_TEMPORAL_RESTRICTION: 0
OD_NORMAL: 1

## 2024-10-16 ASSESSMENT — EXTERNAL EXAM - RIGHT EYE: OD_EXAM: NORMAL

## 2024-10-16 ASSESSMENT — VISUAL ACUITY
OS_SC: 20/20
METHOD: SNELLEN - LINEAR
OD_SC: 20/25

## 2024-10-16 ASSESSMENT — REFRACTION_MANIFEST
OS_CYLINDER: SPHERE
OS_SPHERE: +0.50
OD_CYLINDER: +0.25
OD_AXIS: 145
OD_SPHERE: +0.50

## 2024-10-16 ASSESSMENT — TONOMETRY
OD_IOP_MMHG: 17
IOP_METHOD: ICARE
OS_IOP_MMHG: 18

## 2024-10-16 ASSESSMENT — CUP TO DISC RATIO
OD_RATIO: 0.5
OS_RATIO: 0.5

## 2024-10-16 ASSESSMENT — SLIT LAMP EXAM - LIDS: COMMENTS: TRACE MGD

## 2024-10-16 ASSESSMENT — EXTERNAL EXAM - LEFT EYE: OS_EXAM: NORMAL

## 2024-10-16 ASSESSMENT — PAIN SCALES - GENERAL: PAINLEVEL: WORST PAIN (10)

## 2024-10-16 NOTE — TELEPHONE ENCOUNTER
Jennifer is calling to see if there are any openings for PM today.  Message sent to Teams Chat.  Transferred caller to Elroy Rodriguez to discuss labs apt.

## 2024-10-16 NOTE — TELEPHONE ENCOUNTER
1220 This writer spoke to Jennifer, still no infusion appt available at this time but provider still wants Jennifer to get labs done given symptoms reported earlier today. Pt in agreement for 2:00pm labs after her 1:00pm eye appt.     1220 Scheduling request sent to add on lab appt.     1336 Pt called back, as of 1335 no appt available for infusion, pt will try again tomorrow. Pt is at lab appt to get labs done today given reported symptoms earlier.

## 2024-10-16 NOTE — TELEPHONE ENCOUNTER
Oncology Nurse Triage - Sickle Cell Pain Crisis:     Situation: Jenniefr  calling about Sickle Cell Pain Crisis, requesting to be added on for IV fluids and pain medicine     Background:      Patient's last infusion: 10/14/2024  Last clinic visit date:10/11/2024 Patricia Mantilla,   Does patient have active treatment plan?  Yes        Assessment of Symptoms:  Onset/Duration of symptoms: 4 day     Is it typical sickle cell pain? Yes   Location: left arm and collarbone  Character: Sharp           Intensity: 10/10     Any radiation of pain, numbness, tingling, weakness, warmth, swelling, discoloration of arms or legs?No      Fever?Yes Max in past 24hours around 102.4F     Chest Pain Present: No      Shortness of breath: No      Other home therapies tried: HEAT/HEATING PAD and WARM BATH      Last home medication taken and when: 6:00am, oxycocone      Any Refills Needed?:No     Does patient have transportation & length of time to get to clinic: Has appointment for 12:45pm visit.         Recommendations:   Pending contacted Patricia mantilla CNP    [id number] Per sam Gomez for Pain meds appt if an appt becomes available, TORB for CBC with Diff, Reticulocyte count and UA/UC.

## 2024-10-16 NOTE — TELEPHONE ENCOUNTER
DATE/TIME OF CALL RECEIVED FROM LAB:  10/16/24 at 2:54 PM   Critical LAB TEST:  Hgb 6.6  Last LAB VALUE:  10/12/24 Hgb 7.1  Blood therapy plan: Transfuse if Hgb 8-10 g/dL or less  PROVIDER NOTIFIED?: Yes  PROVIDER NAME: Patricia Mantilla  DATE/TIME LAB VALUE REPORTED TO PROVIDER: 10/16/2024  MECHANISM OF PROVIDER NOTIFICATION:  Secure message  PROVIDER RESPONSE:   [id number] acknowledged by Patricia Mantilla. Nothing further needed from triage.

## 2024-10-16 NOTE — NURSING NOTE
Chief Complaint   Patient presents with    Port Draw     Lab only visit, Port accessed labs drawn, heparin locked, messaged provider     /66 (BP Location: Left arm, Patient Position: Sitting, Cuff Size: Adult Large)   Pulse 95   Temp 97.7  F (36.5  C) (Oral)   Resp 16   SpO2 100%     Patient reported not feeling well with swollen lump painful 10/10 on right side base of neck. Provider notified. Ok to send home today will be seen in clinic tomorrow by Jalyn Bajwa, per secure chat.    Aravind Feldman RN on 10/16/2024 at 2:22 PM

## 2024-10-16 NOTE — TELEPHONE ENCOUNTER
"Deferasirox 360mg tab  Last prescribing provider: Patricia Mantilla    Last clinic visit date: 10/11/24    Recommendations for requested medication (if none, N/A): 10/11/24, \"- Continue Jadenu for iron overload.  Will revisit need for an additional chelation agent following her next ferriscan.\"    Any other pertinent information (if none, N/A): NA    Refilled: Y/N, if NO, why?    "

## 2024-10-16 NOTE — NURSING NOTE
Chief Complaints and History of Present Illnesses   Patient presents with    COMPREHENSIVE EYE EXAM     Chief Complaint(s) and History of Present Illness(es)       COMPREHENSIVE EYE EXAM              Laterality: both eyes    Associated symptoms: Negative for dryness, eye pain, tearing, flashes and floaters    Pain scale: 0/10              Comments    Pt notes that she wears her gls for mostly reading/watching tv/looking at a screen and they are working well for her, she did not bring with her today.     Gabbi DAVIS October 16, 2024 12:54 PM

## 2024-10-16 NOTE — PROGRESS NOTES
Assessment/Plan  (D57.1) Sickle cell disease without crisis (H)  (primary encounter diagnosis)  Comment: No evidence of retinopathy today.   Plan: Monitor each year with dilated exam. Return to clinic with new flashes, floaters, or curtain over vision.     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 Annual dilated exams are recommended to monitor for changes.     (H52.4) Accommodative insufficiency  Plan: REFRACTION [[id number]]        Discussed findings with patient. New spectacle prescription dispensed to patient. Patient is welcome to return to clinic with prolonged adaptation difficulties.       Complete documentation of historical and exam elements from today's encounter can  be found in the full encounter summary report (not reduplicated in this progress  note). I personally obtained the chief complaint(s) and history of present illness. I  confirmed and edited as necessary the review of systems, past medical/surgical  history, family history, social history, and examination findings as documented by  others; and I examined the patient myself. I personally reviewed the relevant tests,  images, and reports as documented above. I formulated and edited as necessary the  assessment and plan and discussed the findings and management plan with the  patient and family.    Usama Mcclendon OD

## 2024-10-17 ENCOUNTER — HOSPITAL ENCOUNTER (EMERGENCY)
Facility: CLINIC | Age: 24
Discharge: HOME | End: 2024-10-18
Attending: STUDENT IN AN ORGANIZED HEALTH CARE EDUCATION/TRAINING PROGRAM | Admitting: STUDENT IN AN ORGANIZED HEALTH CARE EDUCATION/TRAINING PROGRAM
Payer: COMMERCIAL

## 2024-10-17 ENCOUNTER — APPOINTMENT (OUTPATIENT)
Dept: ULTRASOUND IMAGING | Facility: CLINIC | Age: 24
End: 2024-10-17
Attending: STUDENT IN AN ORGANIZED HEALTH CARE EDUCATION/TRAINING PROGRAM
Payer: COMMERCIAL

## 2024-10-17 ENCOUNTER — APPOINTMENT (OUTPATIENT)
Dept: CT IMAGING | Facility: CLINIC | Age: 24
End: 2024-10-17
Attending: STUDENT IN AN ORGANIZED HEALTH CARE EDUCATION/TRAINING PROGRAM
Payer: COMMERCIAL

## 2024-10-17 ENCOUNTER — NURSE TRIAGE (OUTPATIENT)
Dept: ONCOLOGY | Facility: CLINIC | Age: 24
End: 2024-10-17
Payer: COMMERCIAL

## 2024-10-17 ENCOUNTER — ONCOLOGY VISIT (OUTPATIENT)
Dept: ONCOLOGY | Facility: CLINIC | Age: 24
End: 2024-10-17
Attending: REGISTERED NURSE
Payer: COMMERCIAL

## 2024-10-17 ENCOUNTER — APPOINTMENT (OUTPATIENT)
Dept: GENERAL RADIOLOGY | Facility: CLINIC | Age: 24
End: 2024-10-17
Attending: STUDENT IN AN ORGANIZED HEALTH CARE EDUCATION/TRAINING PROGRAM
Payer: COMMERCIAL

## 2024-10-17 VITALS
RESPIRATION RATE: 16 BRPM | HEART RATE: 92 BPM | DIASTOLIC BLOOD PRESSURE: 72 MMHG | OXYGEN SATURATION: 100 % | SYSTOLIC BLOOD PRESSURE: 111 MMHG | TEMPERATURE: 98.2 F | BODY MASS INDEX: 26.81 KG/M2 | WEIGHT: 156.2 LBS

## 2024-10-17 DIAGNOSIS — R22.2 SUPRACLAVICULAR MASS: ICD-10-CM

## 2024-10-17 DIAGNOSIS — D57.00 SICKLE CELL DISEASE WITH CRISIS (H): ICD-10-CM

## 2024-10-17 DIAGNOSIS — M54.2 NECK PAIN ON RIGHT SIDE: ICD-10-CM

## 2024-10-17 DIAGNOSIS — D57.00 SICKLE CELL PAIN CRISIS (H): Primary | ICD-10-CM

## 2024-10-17 LAB
ANION GAP SERPL CALCULATED.3IONS-SCNC: 12 MMOL/L (ref 7–15)
BASOPHILS # BLD AUTO: 0.2 10E3/UL (ref 0–0.2)
BASOPHILS NFR BLD AUTO: 2 %
BUN SERPL-MCNC: 6.2 MG/DL (ref 6–20)
CALCIUM SERPL-MCNC: 9.1 MG/DL (ref 8.8–10.4)
CHLORIDE SERPL-SCNC: 106 MMOL/L (ref 98–107)
CREAT SERPL-MCNC: 0.47 MG/DL (ref 0.51–0.95)
EGFRCR SERPLBLD CKD-EPI 2021: >90 ML/MIN/1.73M2
EOSINOPHIL # BLD AUTO: 0.3 10E3/UL (ref 0–0.7)
EOSINOPHIL NFR BLD AUTO: 2 %
ERYTHROCYTE [DISTWIDTH] IN BLOOD BY AUTOMATED COUNT: 20.5 % (ref 10–15)
FLUAV RNA SPEC QL NAA+PROBE: NEGATIVE
FLUBV RNA RESP QL NAA+PROBE: NEGATIVE
GLUCOSE SERPL-MCNC: 85 MG/DL (ref 70–99)
HCO3 SERPL-SCNC: 21 MMOL/L (ref 22–29)
HCT VFR BLD AUTO: 20.4 % (ref 35–47)
HGB BLD-MCNC: 6.7 G/DL (ref 11.7–15.7)
IMM GRANULOCYTES # BLD: 0 10E3/UL
IMM GRANULOCYTES NFR BLD: 0 %
LYMPHOCYTES # BLD AUTO: 2 10E3/UL (ref 0.8–5.3)
LYMPHOCYTES NFR BLD AUTO: 18 %
MCH RBC QN AUTO: 28.6 PG (ref 26.5–33)
MCHC RBC AUTO-ENTMCNC: 32.8 G/DL (ref 31.5–36.5)
MCV RBC AUTO: 87 FL (ref 78–100)
MONOCYTES # BLD AUTO: 1 10E3/UL (ref 0–1.3)
MONOCYTES NFR BLD AUTO: 8 %
NEUTROPHILS # BLD AUTO: 7.9 10E3/UL (ref 1.6–8.3)
NEUTROPHILS NFR BLD AUTO: 69 %
NRBC # BLD AUTO: 0 10E3/UL
NRBC BLD AUTO-RTO: 0 /100
PLATELET # BLD AUTO: 549 10E3/UL (ref 150–450)
POTASSIUM SERPL-SCNC: 3.9 MMOL/L (ref 3.4–5.3)
RBC # BLD AUTO: 2.34 10E6/UL (ref 3.8–5.2)
RSV RNA SPEC NAA+PROBE: NEGATIVE
SARS-COV-2 RNA RESP QL NAA+PROBE: NEGATIVE
SODIUM SERPL-SCNC: 139 MMOL/L (ref 135–145)
WBC # BLD AUTO: 11.4 10E3/UL (ref 4–11)

## 2024-10-17 PROCEDURE — 85025 COMPLETE CBC W/AUTO DIFF WBC: CPT | Performed by: STUDENT IN AN ORGANIZED HEALTH CARE EDUCATION/TRAINING PROGRAM

## 2024-10-17 PROCEDURE — 87637 SARSCOV2&INF A&B&RSV AMP PRB: CPT | Performed by: STUDENT IN AN ORGANIZED HEALTH CARE EDUCATION/TRAINING PROGRAM

## 2024-10-17 PROCEDURE — 76536 US EXAM OF HEAD AND NECK: CPT

## 2024-10-17 PROCEDURE — 80048 BASIC METABOLIC PNL TOTAL CA: CPT | Performed by: STUDENT IN AN ORGANIZED HEALTH CARE EDUCATION/TRAINING PROGRAM

## 2024-10-17 PROCEDURE — 70490 CT SOFT TISSUE NECK W/O DYE: CPT

## 2024-10-17 PROCEDURE — 250N000011 HC RX IP 250 OP 636: Performed by: STUDENT IN AN ORGANIZED HEALTH CARE EDUCATION/TRAINING PROGRAM

## 2024-10-17 PROCEDURE — 96376 TX/PRO/DX INJ SAME DRUG ADON: CPT | Performed by: STUDENT IN AN ORGANIZED HEALTH CARE EDUCATION/TRAINING PROGRAM

## 2024-10-17 PROCEDURE — 71046 X-RAY EXAM CHEST 2 VIEWS: CPT

## 2024-10-17 PROCEDURE — 96375 TX/PRO/DX INJ NEW DRUG ADDON: CPT | Performed by: STUDENT IN AN ORGANIZED HEALTH CARE EDUCATION/TRAINING PROGRAM

## 2024-10-17 PROCEDURE — G0463 HOSPITAL OUTPT CLINIC VISIT: HCPCS | Performed by: REGISTERED NURSE

## 2024-10-17 PROCEDURE — 36415 COLL VENOUS BLD VENIPUNCTURE: CPT | Performed by: STUDENT IN AN ORGANIZED HEALTH CARE EDUCATION/TRAINING PROGRAM

## 2024-10-17 PROCEDURE — 71250 CT THORAX DX C-: CPT

## 2024-10-17 PROCEDURE — 99284 EMERGENCY DEPT VISIT MOD MDM: CPT | Performed by: STUDENT IN AN ORGANIZED HEALTH CARE EDUCATION/TRAINING PROGRAM

## 2024-10-17 PROCEDURE — 96374 THER/PROPH/DIAG INJ IV PUSH: CPT | Performed by: STUDENT IN AN ORGANIZED HEALTH CARE EDUCATION/TRAINING PROGRAM

## 2024-10-17 PROCEDURE — 99285 EMERGENCY DEPT VISIT HI MDM: CPT | Mod: 25 | Performed by: STUDENT IN AN ORGANIZED HEALTH CARE EDUCATION/TRAINING PROGRAM

## 2024-10-17 PROCEDURE — 99215 OFFICE O/P EST HI 40 MIN: CPT | Performed by: REGISTERED NURSE

## 2024-10-17 PROCEDURE — G2211 COMPLEX E/M VISIT ADD ON: HCPCS | Performed by: REGISTERED NURSE

## 2024-10-17 RX ORDER — KETOROLAC TROMETHAMINE 30 MG/ML
30 INJECTION, SOLUTION INTRAMUSCULAR; INTRAVENOUS ONCE
Status: COMPLETED | OUTPATIENT
Start: 2024-10-17 | End: 2024-10-17

## 2024-10-17 RX ORDER — ONDANSETRON 2 MG/ML
8 INJECTION INTRAMUSCULAR; INTRAVENOUS ONCE
Status: COMPLETED | OUTPATIENT
Start: 2024-10-17 | End: 2024-10-17

## 2024-10-17 RX ADMIN — HYDROMORPHONE HYDROCHLORIDE 1 MG: 1 INJECTION, SOLUTION INTRAMUSCULAR; INTRAVENOUS; SUBCUTANEOUS at 21:37

## 2024-10-17 RX ADMIN — HYDROMORPHONE HYDROCHLORIDE 1 MG: 1 INJECTION, SOLUTION INTRAMUSCULAR; INTRAVENOUS; SUBCUTANEOUS at 18:52

## 2024-10-17 RX ADMIN — HYDROMORPHONE HYDROCHLORIDE 1 MG: 1 INJECTION, SOLUTION INTRAMUSCULAR; INTRAVENOUS; SUBCUTANEOUS at 23:08

## 2024-10-17 RX ADMIN — ONDANSETRON 8 MG: 2 INJECTION INTRAMUSCULAR; INTRAVENOUS at 18:47

## 2024-10-17 RX ADMIN — HYDROMORPHONE HYDROCHLORIDE 1 MG: 1 INJECTION, SOLUTION INTRAMUSCULAR; INTRAVENOUS; SUBCUTANEOUS at 20:31

## 2024-10-17 RX ADMIN — KETOROLAC TROMETHAMINE 30 MG: 30 INJECTION, SOLUTION INTRAMUSCULAR at 18:50

## 2024-10-17 ASSESSMENT — ACTIVITIES OF DAILY LIVING (ADL)
ADLS_ACUITY_SCORE: 37

## 2024-10-17 ASSESSMENT — PAIN SCALES - GENERAL: PAINLEVEL: EXTREME PAIN (8)

## 2024-10-17 NOTE — LETTER
"10/17/2024      Jennifer Cervantes  [address]  [facility] [address]      Dear Colleague,    Thank you for referring your patient, Jennifer Cervantes, to the [facility]. Please see a copy of my visit note below.    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 She had multiple thromboembolic events in 2021 despite adherent anticoagulation use (though warfarin was perpetually low) and there are concerns for chronic thromboembolic disease but did not have pulmonary HTN on a November 2021 cath. She is maintained on chronic PO opioids and twice-weekly infusion visits (since 1/24/22) but has been able to be maintained on this regimen and has stayed out of the ED most of the time with even rarer admissions for most of 2023.     Interval History:  Jennifer is seen for an acute visit today to assess a new lump on her neck and report of fevers following influenza and pneumococcal vaccines on 10/11/24.  Lump has receded somewhat but was \"really big\" the past couple of days. She started having fevers up to 101-102, states her temp has been this high every time she has taken it, although notes that she has been afebrile at all of her clinic visits.  Neck hurts when she laughs, coughs, or turns over in bed. Denies sensation changes or edema in RUE.    Feels like she needs to cough, feels like she is wheezing, but is not able to cough with how much her neck hurts.     Notes she has had this problem with her neck before - states it \"was just like this but way " "worse.\"    She intends to report the ED after our visit for pain medication.  She has been trying to get into the infusion center the past couple of days but there have not been appointments available and she does not think she can wait until tomorrow with how intense and persistent her pain has been this week.    Sickle Cell Disease Comprehensive Checklist  Bone Health/Avascular Necrosis Screening/Symptoms (each visit): no new concerns today  Leg Ulcer evaluation (every visit): no  Hypertension (every visit):stable 11/3/23  Last pulmonary evaluation (asthma, AMAN, pulm HTN): 9/28/22, due for follow-up  Stroke/silent cerebral infarct Hx (Y/N): Yes TIA ~2014, first event ~age 1 with full stroke and R sided weakness  Last PCP Visit: 7/3/24 with Dr. Case  Vaccines:  PCV13: 5/13/19  Pneumovax (PPSV23): 3/04, 10/09, 7/12/19, 10/11/2024  Menactra: 4/2010, 9/2015 (MCV done 8/16/21)  MenB: 9/16/15, 5/13/19  Influenza: 10/11/24  Audiology (chelation): done 2/27/24    Comparison to Previous Audiogram dated 6/6/2022:     Pure Tone Thresholds 250-8000 Hz:    RIGHT: stable    LEFT: stable     High Frequency Audiometry 9,000-16,000Hz:     RIGHT: stable    LEFT: stable     Word Recognition Score:    RIGHT: stable    LEFT: stable    Plan last reviewed with patient: 10/2/23    Patient background: 24 yo F, enjoys movies and kids though there are times where she does not really want to talk to people. Does not have a lot of social support at home.     Sickle Cell Disease History  Primary Hematologist Team: Jose Rafael Duncan  PCP: none  Genotype: SS  Acute Pain Crisis Treatment: (limiting IV)   ER   Dilaudid 1 mg IV q1h up to 3 doses  Toradol 30 mg IV x1   Maintenance IV fluids with LR  Other: Zofran 8 mg IV PRN nausea  Inpatient:  PCA Dilaudid 1 hr q30 minutes, no basal rate  Toradol 30 mg x6h x 4 hr  LR maintenance x 1-2 days  Other Medications: Zofran  ASA  Supportive Care: Docusate, Senna  Chronic Pain Medications:    Home regimen: " oxycodone 15 mg p.o. q.4-6 hours p.r.n. breakthrough pain.  She also continues on Voltaren gel, and Zoloft among other medications.    -Also benefits from mental health visits, acupuncture  Baseline Hemoglobin: 7 g/dl without chronic transfusions  Hydroxyurea use: Yes  H/O blood transfusions: Yes, several (iron overload) Most recent 11/20/2021  H/O Transfusion Reactions: no  Antibodies:none  H/O Acute Chest Syndrome: Yes  Last episode:9/05/22 (previously 4/26/21, 10/2019)   ICU/intubation: not with 9/2022 admission  H/O Stroke: Yes (managed with chronic transfusions in the past, stopped late Spring 2020)  H/O VTE: Yes (2/2021)  H/O Cholecystectomy or Splenectomy: no  H/O Asthma, Pulm HTN, AVN, Leg Ulcers, Nephropathy, Retinopathy, etc: Iron overload, asthma, chronic lung disease, physical limitations from early stroke    ---------------------------------------  Jennifer Cervantes's Goals (reviewed today 5/24/24)    1-3 month goal:  Continue to look for a job    6 month goal:      12 month goal:      Disease-specific goal(s):  Decrease frequency of ED visits. Decrease opioid qty per weekly refill. She'd like to get down to 5 tablets per week by October      Current Outpatient Medications   Medication Sig Dispense Refill     albuterol (PROVENTIL) (2.5 MG/3ML) 0.083% neb solution Take 2 vials (5 mg) by nebulization every 6 hours as needed for shortness of breath or wheezing 90 mL 3     aspirin (ASA) 81 MG chewable tablet Take 1 tablet (81 mg) by mouth 2 times daily 60 tablet 11     budesonide-formoterol (SYMBICORT) 160-4.5 MCG/ACT Inhaler Inhale 2 puffs twice daily plus 1-2 puffs as needed. May use up to 12 puffs per day. 20.4 g 11     deferasirox (JADENU) 360 MG tablet TAKE 4 TABLETS BY MOUTH EVERY EVENING 120 tablet 1     diphenhydrAMINE (BENADRYL) 50 MG capsule Take 1 capsule (50 mg) by mouth every 6 hours as needed for itching or allergies. Administer 12  hours pre - IV contrast injection and patient must have a .  1 capsule 0     EPINEPHrine (ANY BX GENERIC EQUIV) 0.3 MG/0.3ML injection 2-pack Inject 0.3 mLs (0.3 mg) into the muscle as needed for anaphylaxis. 1 each 1     gabapentin (NEURONTIN) 300 MG capsule Take 1 capsule (300 mg) by mouth 3 times daily. Take PO 1 pill in evening (300 mg) TID to start. If tolerated, increase by 300 mg in morning or lunch every few days, updating your doctor's office in time to get refills. The maximum dose is 900 mg TID. 90 capsule 1     hydroxyurea (HYDREA) 500 MG capsule Take 6 capsules (3,000 mg) by mouth daily 180 capsule 11     ibuprofen (ADVIL/MOTRIN) 800 MG tablet Take 800 mg by mouth every 8 hours as needed for moderate pain       melatonin 5 MG tablet Take 1 tablet (5 mg) by mouth nightly as needed for sleep 30 tablet 1     methocarbamol (ROBAXIN) 750 MG tablet Take 1 tablet (750 mg) by mouth 4 times daily as needed for muscle spasms (during sickle pain crises. Okay to take scheduled while in pain) 60 tablet 1     methylPREDNISolone (MEDROL) 32 MG tablet Take 1 tablet (32 mg) by mouth daily. 2 hours prior to the procedure with IV contrast 1 tablet 0     naloxone (NARCAN) 4 MG/0.1ML nasal spray Spray 4 mg into one nostril alternating nostrils as needed for opioid reversal. every 2-3 minutes until assistance arrives 0.2 mL 0     ondansetron (ZOFRAN ODT) 8 MG ODT tab Take 1 tablet (8 mg) by mouth every 8 hours as needed for nausea 40 tablet 4     oxyCODONE IR (ROXICODONE) 10 MG tablet Take 1 tablet (10 mg) by mouth every 4 hours as needed for severe pain or breakthrough pain. Goal 4 per day. Max 6 per day. 15 tablet 0     Past Medical History  Past Medical History:   Diagnosis Date     Anxiety      Bleeding disorder (H)      Blood clotting disorder (H)      Cerebral infarction (H) 2015     Cognitive developmental delay     low IQ. Please recognize when managing pain and planning with her     Depressive disorder      Hemiplegia and hemiparesis following cerebral infarction affecting  right dominant side (H)     right hand contractures     Iron overload due to repeated red blood cell transfusions      Migraines      Multiple subsegmental pulmonary emboli without acute cor pulmonale (H) 02/01/2021     Oppositional defiant behavior      Presence of intrauterine contraceptive device 2/18/2020     Superficial venous thrombosis of arm, right 03/25/2021     Uncomplicated asthma      Past Surgical History:   Procedure Laterality Date     AS INSERT TUNNELED CV 2 CATH W/O PORT/PUMP       CHOLECYSTECTOMY       CV RIGHT HEART CATH MEASUREMENTS RECORDED N/A 11/18/2021    Procedure: Right Heart Cath;  Surgeon: Jackson Stauffer MD;  Location:  HEART CARDIAC CATH LAB     INSERT PORT VASCULAR ACCESS Left 4/21/2021    Procedure: INSERTION, VASCULAR ACCESS PORT (NOT SURE ON SIDE UNTIL REMOVAL);  Surgeon: Rajan More MD;  Location: [facility] OR     IR CHEST PORT PLACEMENT > 5 YRS OF AGE  4/21/2021     IR CVC NON TUNNEL LINE REMOVAL  5/6/2021     IR CVC NON TUNNEL PLACEMENT > 5 YRS  04/07/2020     IR CVC NON TUNNEL PLACEMENT > 5 YRS  4/30/2021     IR CVC NON TUNNEL PLACEMENT > 5 YRS  9/7/2022     IR PORT REMOVAL LEFT  4/21/2021     REMOVE PORT VASCULAR ACCESS Left 4/21/2021    Procedure: REMOVAL, VASCULAR ACCESS PORT LEFT;  Surgeon: Rajan More MD;  Location: [facility] OR     REPAIR TENDON ELBOW Right 10/02/2019    Procedure: Right Elbow Flexor Lengthening, Flexor Pronator Slide Of Wrist and Finger, Thumb Adductor Lengthening;  Surgeon: Anai Franco MD;  Location: [facility] OR     TONSILLECTOMY Bilateral 10/02/2019    Procedure: Bilateral Tonsillectomy;  Surgeon: Farhana Guy MD;  Location: [facility] OR     ZC BREAST REDUCTION (INCLUDES LIPO) TIER 3 Bilateral 04/23/2019     Allergies   Allergen Reactions     Contrast Dye Angioedema     Hives and breathing issues     Fish-Derived Products Hives     Seafood Hives     Adhesive Tape Hives     Primipore dressing causes hives     Gadolinium      Iodinated  [employer]      Social History   Social History     Tobacco Use     Smoking status: Never     Passive exposure: Never     Smokeless tobacco: Never   Substance Use Topics     Alcohol use: Not Currently     Alcohol/week: 0.0 standard drinks of alcohol     Drug use: Never   Her mom lives next door to her.  Past medical history and social history were reviewed.    Physical Examination:  /72 (BP Location: Left arm, Patient Position: Left side, Cuff Size: Adult Regular)   Pulse 92   Temp 98.2  F (36.8  C) (Oral)   Resp 16   Wt 70.9 kg (156 lb 3.2 oz)   SpO2 100%   BMI 26.81 kg/m        Wt Readings from Last 10 Encounters:   10/17/24 70.9 kg (156 lb 3.2 oz)   10/12/24 70.3 kg (155 lb)   10/11/24 71 kg (156 lb 9.6 oz)   10/04/24 68.9 kg (152 lb)   09/26/24 69.4 kg (152 lb 14.4 oz)   09/15/24 70.3 kg (155 lb)   09/13/24 70.7 kg (155 lb 14.4 oz)   09/10/24 70 kg (154 lb 6.4 oz)   08/31/24 68.5 kg (151 lb)   08/29/24 68 kg (150 lb)     Gen: alert, pleasant and conversational, NAD  HEENT: NC/AT,EOMI w/ PERRL, anicteric sclera.   Neck: Supraclavicular fullness on right side that is tender to light palpation; limited ROM due to pain  CV: normal S1,S2 with RRR no m/r/g  Resp: lungs CTA bilaterally with adequate air movement to bases. No wheezes or crackles  Ext: warm and well perfused, no edema or cyanosis  Skin: no concerning lesions or rashes  Neuro: A&Ox4, no lateralizing sx. Grossly nonfocal.  Psych: appropriate, reactive    Laboratory Data:  Most Recent 3 CBC's:  Recent Labs   Lab Test 10/16/24  1419 10/12/24  2122 10/11/24  1024   WBC 13.5* 19.8* 11.6*   HGB 6.6* 7.1* 7.7*   MCV 86 88 86   * 469* 498*   ANEUTAUTO 10.3* 15.7* 8.2    Most Recent 3 BMP's:  Recent Labs   Lab Test 10/12/24  2122 10/11/24  1024 10/04/24  1553 09/20/24  1554 09/13/24  1228 09/10/24  2341    138 140 138   < > 136   POTASSIUM 3.6 3.6 4.1 3.9   < > 3.9   CHLORIDE 102 105 106 106   < > 104   CO2 22 23 24 21*   < > 22   BUN  6.9 8.7 9.5 7.3   < > 6.3   CR 0.50* 0.49* 0.62 0.54   < > 0.63   ANIONGAP 11 10 10 11   < > 10   MICAH 8.3* 9.1 9.3 9.2   < > 8.8   GLC 94 93 95 86   < > 91   PROTTOTAL  --   --  7.5 7.8  --  7.8   ALBUMIN  --   --  4.7 4.8  --  4.5    < > = values in this interval not displayed.    Most Recent 2 LFT's:  Recent Labs   Lab Test 10/04/24  1553 09/20/24  1554   AST 45 50*   ALT 28 31   ALKPHOS 60 59   BILITOTAL 3.1* 4.1*    Most Recent TSH and T4:  Recent Labs   Lab Test 04/23/24  1754   TSH 0.60     Phos/Mag:  Lab Results   Component Value Date    PHOS 4.8 (H) 05/13/2023    PHOS 5.0 (H) 05/12/2023    PHOS 3.6 02/21/2021    MAG 2.0 05/16/2024    MAG 1.7 04/29/2024    MAG 1.9 04/28/2024      I reviewed the above labs today.    Assessment and Plan:  1. Sickle Cell HgbSS Disease  2. Acute pain crises  3. Chronic Pain  4. 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 History of CVA  7. Hearing loss  8. Nausea/vomiting       Jennifer is seen today for an acute visit to assess a painful lump on her neck.  Please see Patricia Mantilla's note from 10/11/24 for a more comprehensive review of her sickle cell plan.    Right-sided neck tenderness started after her vaccinations on 10/11/24.  She had a similar issue in April 2023, no associated vaccination at that time.  Soft tissue US in 2023 was unrevealing and pain resolved spontaneously in the following week.  Discussed that differential includes reactive lymph nodes from her recent vaccination which should resolve independently with time, but in this patient with underlying sickle cell disease and history of stroke, need to consider risk for thrombosis.  We discussed that since she intends to report to the ED following our appointment anyway, it may be most expedient for her to get imaging performed at the ED.  We also discussed the possibility of a blood transfusion for her hemoglobin of 6.6.  I will send a message to her primary team to  follow-up with her tomorrow depending on her ED course.    49 minutes spent on the date of the encounter doing chart review, review of test results, interpretation of tests, patient visit, and documentation     The longitudinal plan of care for the diagnosis(es)/condition(s) as documented were addressed during this visit. Due to the added complexity in care, I will continue to support Jennifer in the subsequent management and with ongoing continuity of care.    EITAN Ortega CNP  [facility] [place] [id number] [phone]          Again, thank you for allowing me to participate in the care of your patient.        Sincerely,        EITAN Weber CNP

## 2024-10-17 NOTE — ED PROVIDER NOTES
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 At the time she reported swelling along her collarbones, but workup was negative for any acute concerns. Patient was seen in oncology clinic this morning and complained of increased pain and swelling to her neck along with fevers. Hemoglobin was noted to be 6.6. She was recommended to present to the ED for further evaluation.     She reports that she has been having a lump and pain there that she has noticed over the last week.  Coughing, clearing her throat also make it more painful.  She does endorse a nonproductive or minimally productive cough.  Fevers at home has been as high as 101 and 102.  No significant throat swelling or pain.    ER Acute Pain Crisis Treatment  Dilaudid 1 mg IV q1h up to 3 doses  Toradol 30 mg IV x1   Maintenance IV fluids with LR  Other: Zofran 8 mg IV PRN nausea    Past Medical History  Past Medical History:   Diagnosis Date    Anxiety     Bleeding disorder (H)     Blood clotting disorder (H)     Cerebral infarction (H) 2015    Cognitive developmental delay     low IQ. Please recognize when managing pain  and planning with her    Depressive disorder     Hemiplegia and hemiparesis following cerebral infarction affecting right dominant side (H)     right hand contractures    Iron overload due to repeated red blood cell transfusions     Migraines     Multiple subsegmental pulmonary emboli without acute cor pulmonale (H) 02/01/2021    Oppositional defiant behavior     Presence of intrauterine contraceptive device 2/18/2020    Superficial venous thrombosis of arm, right 03/25/2021    Uncomplicated asthma      Past Surgical History:   Procedure Laterality Date    AS INSERT TUNNELED CV 2 CATH W/O PORT/PUMP      CHOLECYSTECTOMY      CV RIGHT HEART CATH MEASUREMENTS RECORDED N/A 11/18/2021    Procedure: Right Heart Cath;  Surgeon: Jackson Stauffer MD;  Location:  HEART CARDIAC CATH LAB    INSERT PORT VASCULAR ACCESS Left 4/21/2021    Procedure: INSERTION, VASCULAR ACCESS PORT (NOT SURE ON SIDE UNTIL REMOVAL);  Surgeon: Rajan More MD;  Location: [facility] OR    IR CHEST PORT PLACEMENT > 5 YRS OF AGE  4/21/2021    IR CVC NON TUNNEL LINE REMOVAL  5/6/2021    IR CVC NON TUNNEL PLACEMENT > 5 YRS  04/07/2020    IR CVC NON TUNNEL PLACEMENT > 5 YRS  4/30/2021    IR CVC NON TUNNEL PLACEMENT > 5 YRS  9/7/2022    IR PORT REMOVAL LEFT  4/21/2021    REMOVE PORT VASCULAR ACCESS Left 4/21/2021    Procedure: REMOVAL, VASCULAR ACCESS PORT LEFT;  Surgeon: Rajan More MD;  Location: [facility] OR    REPAIR TENDON ELBOW Right 10/02/2019    Procedure: Right Elbow Flexor Lengthening, Flexor Pronator Slide Of Wrist and Finger, Thumb Adductor Lengthening;  Surgeon: Anai Franco MD;  Location: [facility] OR    TONSILLECTOMY Bilateral 10/02/2019    Procedure: Bilateral Tonsillectomy;  Surgeon: Farhana Guy MD;  Location: [facility] OR    ZZC BREAST REDUCTION (INCLUDES LIPO) TIER 3 Bilateral 04/23/2019     albuterol (PROVENTIL) (2.5 MG/3ML) 0.083% neb solution  aspirin (ASA) 81 MG chewable tablet  budesonide-formoterol (SYMBICORT) 160-4.5  MCG/ACT Inhaler  deferasirox (JADENU) 360 MG tablet  diphenhydrAMINE (BENADRYL) 50 MG capsule  EPINEPHrine (ANY BX GENERIC EQUIV) 0.3 MG/0.3ML injection 2-pack  gabapentin (NEURONTIN) 300 MG capsule  hydroxyurea (HYDREA) 500 MG capsule  ibuprofen (ADVIL/MOTRIN) 800 MG tablet  melatonin 5 MG tablet  methocarbamol (ROBAXIN) 750 MG tablet  methylPREDNISolone (MEDROL) 32 MG tablet  naloxone (NARCAN) 4 MG/0.1ML nasal spray  ondansetron (ZOFRAN ODT) 8 MG ODT tab  oxyCODONE IR (ROXICODONE) 10 MG tablet      Allergies   Allergen Reactions    Contrast Dye Angioedema     Hives and breathing issues    Fish-Derived Products Hives    Seafood Hives    Adhesive Tape Hives     Primipore dressing causes hives    Gadolinium     Iodinated Contrast Media      Family History  Family History   Problem Relation Age of Onset    Sickle Cell Trait Mother     Hypertension Mother     Asthma Mother     Sickle Cell Trait Father     Glaucoma No family hx of     Macular Degeneration No family hx of     Diabetes No family hx of     Gout No family hx of      Social History   Social History     Tobacco Use    Smoking status: Never     Passive exposure: Never    Smokeless tobacco: Never   Substance Use Topics    Alcohol use: Not Currently     Alcohol/week: 0.0 standard drinks of alcohol    Drug use: Never      Past medical history, past surgical history, medications, allergies, family history, and social history were reviewed with the patient. No additional pertinent items.     A medically appropriate review of systems was performed with pertinent positives and negatives noted in the HPI, and all other systems negative.    Physical Exam   BP: 113/75  Pulse: 89  Temp: 98.5  F (36.9  C)  Resp: 16  Weight: 70.3 kg (155 lb)  SpO2: 100 %  Physical Exam  GEN: Well appearing, non toxic, cooperative  HEENT: normocephalic and atraumatic, PERRLA, EOMI, no posterior pharyngeal erythema, bilateral TMs within normal limits  CV: well-perfused, normal skin color for  ethnicity, regular rate and rhythm, no murmurs rubs or gallops right supraclavicular swelling and tenderness appreciated, measuring approximately 2 to 3 cm with no overlying skin changes  PULM: breathing comfortably, in no respiratory distress, clear to auscultation upper and lower lung fields, some splinting with deep respirations  ABD: nondistended, soft, nontender  EXT: Full range of motion.  No edema.  NEURO: awake, conversant, grossly normal bilateral upper and lower extremity strength & ROM   SKIN: No rashes, ecchymosis, or lacerations  PSYCH: Calm and cooperative, interactive    ED Course, Procedures, & Data      Procedures          Results for orders placed or performed during the hospital encounter of 10/17/24   XR Chest 2 Views     Status: None    Narrative    EXAM: XR CHEST 2 VIEWS  LOCATION: [facility]  DATE: 10/17/2024    INDICATION: cough, malaise  COMPARISON: 9/18/2024      Impression    IMPRESSION: Left chest Mediport catheter is stable in position. No cardiomediastinal silhouette. No airspace opacity, pleural effusion or pneumothorax.   US Head Neck Soft Tissue     Status: None    Narrative    EXAM: US HEAD NECK SOFT TISSUE  LOCATION: [facility]  DATE: 10/17/2024    INDICATION: R cervical supraclavicular swelling pain  COMPARISON: Chest CT 9/9/2022. Ultrasound reports back to 4/20/2022  TECHNIQUE: Routine.    FINDINGS: Exam targeted to focal area of fullness in right supraclavicular region as directed by the patient.    At this location there is a prominent somewhat thick walled, complex cyst that measures 2.1 x 2.1 x 1.8 cm, the cystic structure appears avascular. This lesion Is lying directly adjacent to a supraclavicular lymph node that measures 2.8 x 1.3 x 1.3 cm.   The lymph node maintains a thin echogenic fatty hilum.    The cystic lesion is similar in size to previous exams from 2022 and likely benign,  though the wall thickness on the lesion has become more pronounced..      Impression    IMPRESSION:  1.  No acute abnormality evident. Modestly prominent lymph node adjacent to a now thick walled cystic structure in the right supraclavicular region has been present back to at least 2022, though some changes have occurred in terms of cyst wall thickness.  2.  Suggest nonemergent dedicated soft tissue neck CT or MRI for further evaluation.   Basic metabolic panel     Status: Abnormal   Result Value Ref Range    Sodium 139 135 - 145 mmol/L    Potassium 3.9 3.4 - 5.3 mmol/L    Chloride 106 98 - 107 mmol/L    Carbon Dioxide (CO2) 21 (L) 22 - 29 mmol/L    Anion Gap 12 7 - 15 mmol/L    Urea Nitrogen 6.2 6.0 - 20.0 mg/dL    Creatinine 0.47 (L) 0.51 - 0.95 mg/dL    GFR Estimate >90 >60 mL/min/1.73m2    Calcium 9.1 8.8 - 10.4 mg/dL    Glucose 85 70 - 99 mg/dL   CBC with platelets and differential     Status: Abnormal   Result Value Ref Range    WBC Count 11.4 (H) 4.0 - 11.0 10e3/uL    RBC Count 2.34 (L) 3.80 - 5.20 10e6/uL    Hemoglobin 6.7 (LL) 11.7 - 15.7 g/dL    Hematocrit 20.4 (L) 35.0 - 47.0 %    MCV 87 78 - 100 fL    MCH 28.6 26.5 - 33.0 pg    MCHC 32.8 31.5 - 36.5 g/dL    RDW 20.5 (H) 10.0 - 15.0 %    Platelet Count 549 (H) 150 - 450 10e3/uL    % Neutrophils 69 %    % Lymphocytes 18 %    % Monocytes 8 %    % Eosinophils 2 %    % Basophils 2 %    % Immature Granulocytes 0 %    NRBCs per 100 WBC 0 <1 /100    Absolute Neutrophils 7.9 1.6 - 8.3 10e3/uL    Absolute Lymphocytes 2.0 0.8 - 5.3 10e3/uL    Absolute Monocytes 1.0 0.0 - 1.3 10e3/uL    Absolute Eosinophils 0.3 0.0 - 0.7 10e3/uL    Absolute Basophils 0.2 0.0 - 0.2 10e3/uL    Absolute Immature Granulocytes 0.0 <=0.4 10e3/uL    Absolute NRBCs 0.0 10e3/uL   CBC with platelets differential     Status: Abnormal    Narrative    The following orders were created for panel order CBC with platelets differential.  Procedure                               Abnormality          Status                     ---------                               -----------         ------                     CBC with platelets and d...[[id number]]  Abnormal            Final result                 Please view results for these tests on the individual orders.     Medications   HYDROmorphone (DILAUDID) injection 1 mg (1 mg Intravenous $Given 10/17/24 2031)   ketorolac (TORADOL) injection 30 mg (30 mg Intravenous $Given 10/17/24 1850)   ondansetron (ZOFRAN) injection 8 mg (8 mg Intravenous $Given 10/17/24 1847)     Labs Ordered and Resulted from Time of ED Arrival to Time of ED Departure   BASIC METABOLIC PANEL - Abnormal       Result Value    Sodium 139      Potassium 3.9      Chloride 106      Carbon Dioxide (CO2) 21 (*)     Anion Gap 12      Urea Nitrogen 6.2      Creatinine 0.47 (*)     GFR Estimate >90      Calcium 9.1      Glucose 85     CBC WITH PLATELETS AND DIFFERENTIAL - Abnormal    WBC Count 11.4 (*)     RBC Count 2.34 (*)     Hemoglobin 6.7 (*)     Hematocrit 20.4 (*)     MCV 87      MCH 28.6      MCHC 32.8      RDW 20.5 (*)     Platelet Count 549 (*)     % Neutrophils 69      % Lymphocytes 18      % Monocytes 8      % Eosinophils 2      % Basophils 2      % Immature Granulocytes 0      NRBCs per 100 WBC 0      Absolute Neutrophils 7.9      Absolute Lymphocytes 2.0      Absolute Monocytes 1.0      Absolute Eosinophils 0.3      Absolute Basophils 0.2      Absolute Immature Granulocytes 0.0      Absolute NRBCs 0.0     INFLUENZA A/B, RSV, & SARS-COV2 PCR     XR Chest 2 Views   Final Result   IMPRESSION: Left chest Mediport catheter is stable in position. No cardiomediastinal silhouette. No airspace opacity, pleural effusion or pneumothorax.      US Head Neck Soft Tissue   Final Result   IMPRESSION:   1.  No acute abnormality evident. Modestly prominent lymph node adjacent to a now thick walled cystic structure in the right supraclavicular region has been present back to at least 2022, though some changes  have occurred in terms of cyst wall thickness.   2.  Suggest nonemergent dedicated soft tissue neck CT or MRI for further evaluation.      CT Soft Tissue Neck w/o Contrast    (Results Pending)   Chest CT w/o contrast    (Results Pending)          Critical care was not performed.     Medical Decision Making  The patient's presentation was of high complexity (a chronic illness severe exacerbation, progression, or side effect of treatment).    The patient's evaluation involved:  review of external note(s) from 3+ sources (see separate area of note for details)  review of 3+ test result(s) ordered prior to this encounter (see separate area of note for details)  ordering and/or review of 3+ test(s) in this encounter (see separate area of note for details)    The patient's management necessitated high risk (a parenteral controlled substance).    Assessment & Plan    24-year-old female with a past medical history of significant sickle cell anemia, frequent pain crises, CVA w residual R hemiparesis, presenting to the emergency department due to 1 week of fevers with Tmax is 102 after getting her COVID and influenza shots in bilateral arms 1 week ago also endorsing increasing pain on the right side of her neck with some swelling and tenderness associated with a nonproductive cough, and a flare of her sickle cell anemia.    Differential for her is broad and includes reactive lymph nodes in the setting of her vaccine, cyst which was previously noted on imaging, pneumonia, viral process, acute chest, sickle cell crisis.    Lab work relatively reassuring and baseline for her despite her hemoglobin in the sixes, does not meet transfusion criteria for her here.Chest x-ray without any evidence of acute opacification, and ultrasound of her neck soft tissue does demonstrate evidence of a thick walled cystic structure in her right supraclavicular region which has been noted in previous imaging back to 2022.  Wall thickness has  increased, and radiology does recommend either a soft tissue neck CT or MRI.  Will pursue CT although limited today without her ability to get contrast due to her previous reaction with angioedema.  No evidence of any overlying skin changes with lower suspicion of an abscess.      Patient has received her pain regimen as below according to her care plan:  ER   Dilaudid 1 mg IV q1h up to 3 doses  Toradol 30 mg IV x1   Maintenance IV fluids with LR (not given in the setting of national fluid shortage)  Other: Zofran 8 mg IV PRN nausea      10:44 PM patient was feeling quite a bit better, however now starting to get uncomfortable again.  She has received her 3 total doses of pain medication here.  Will plan to give her a single further dose before discharge home.  She was told about the lymphadenopathy appreciated on ultrasound and CT.  With the inflammation at this point in time, most  likely related to her recent immunizations, however patient was informed of the need for workup if she has persistent swelling and the possibility that she may need a biopsy    I have reviewed the nursing notes. I have reviewed the findings, diagnosis, plan and need for follow up with the patient.    New Prescriptions    No medications on file       Final diagnoses:   Sickle cell disease with crisis (H)   Supraclavicular mass       Lisa Coombs MD  [facility] EMERGENCY DEPARTMENT  10/17/2024     Lisa Coombs MD  10/17/24 [id number]

## 2024-10-17 NOTE — TELEPHONE ENCOUNTER
Pt calling to check on status of wait list. Informed pt currently no openings at [facility], however, could check [facility] location which might have time available. Pt declining offer since she has appt scheduled for [facility] this afternoon. Writer will notify pt if any openings at [facility] become available.

## 2024-10-17 NOTE — PROGRESS NOTES
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 She had multiple thromboembolic events in 2021 despite adherent anticoagulation use (though warfarin was perpetually low) and there are concerns for chronic thromboembolic disease but did not have pulmonary HTN on a November 2021 cath. She is maintained on chronic PO opioids and twice-weekly infusion visits (since 1/24/22) but has been able to be maintained on this regimen and has stayed out of the ED most of the time with even rarer admissions for most of 2023.     Interval History:  Jennifer is seen for an acute visit today to assess a new lump on her neck and report of fevers following influenza and pneumococcal vaccines on 10/11/24.  Lump has receded somewhat but was \"really big\" the past couple of days. She started having fevers up to 101-102, states her temp has been this high every time she has taken it, although notes that she has been afebrile at all of her clinic visits.  Neck hurts when she laughs, coughs, or turns over in bed. Denies sensation changes or edema in RUE.    Feels like she needs to cough, feels like she is wheezing, but is not able to cough with how much her neck hurts.     Notes she has had this problem with her neck before - states it \"was just like this but way worse.\"    She intends to report the ED after our visit for pain medication.  She has been trying to get into the infusion center the past couple of days but there have not been appointments available and she does not think she can wait " until tomorrow with how intense and persistent her pain has been this week.    Sickle Cell Disease Comprehensive Checklist  Bone Health/Avascular Necrosis Screening/Symptoms (each visit): no new concerns today  Leg Ulcer evaluation (every visit): no  Hypertension (every visit):stable 11/3/23  Last pulmonary evaluation (asthma, AMAN, pulm HTN): 9/28/22, due for follow-up  Stroke/silent cerebral infarct Hx (Y/N): Yes TIA ~2014, first event ~age 1 with full stroke and R sided weakness  Last PCP Visit: 7/3/24 with Dr. Case  Vaccines:  PCV13: 5/13/19  Pneumovax (PPSV23): 3/04, 10/09, 7/12/19, 10/11/2024  Menactra: 4/2010, 9/2015 (MCV done 8/16/21)  MenB: 9/16/15, 5/13/19  Influenza: 10/11/24  Audiology (chelation): done 2/27/24    Comparison to Previous Audiogram dated 6/6/2022:     Pure Tone Thresholds 250-8000 Hz:    RIGHT: stable    LEFT: stable     High Frequency Audiometry 9,000-16,000Hz:     RIGHT: stable    LEFT: stable     Word Recognition Score:    RIGHT: stable    LEFT: stable    Plan last reviewed with patient: 10/2/23    Patient background: 22 yo F, enjoys movies and kids though there are times where she does not really want to talk to people. Does not have a lot of social support at home.     Sickle Cell Disease History  Primary Hematologist Team: Jose Rafael Duncan  PCP: none  Genotype: SS  Acute Pain Crisis Treatment: (limiting IV)   ER   Dilaudid 1 mg IV q1h up to 3 doses  Toradol 30 mg IV x1   Maintenance IV fluids with LR  Other: Zofran 8 mg IV PRN nausea  Inpatient:  PCA Dilaudid 1 hr q30 minutes, no basal rate  Toradol 30 mg x6h x 4 hr  LR maintenance x 1-2 days  Other Medications: Zofran  ASA  Supportive Care: Docusate, Senna  Chronic Pain Medications:    Home regimen: oxycodone 15 mg p.o. q.4-6 hours p.r.n. breakthrough pain.  She also continues on Voltaren gel, and Zoloft among other medications.    -Also benefits from mental health visits, acupuncture  Baseline Hemoglobin: 7 g/dl without chronic  transfusions  Hydroxyurea use: Yes  H/O blood transfusions: Yes, several (iron overload) Most recent 11/20/2021  H/O Transfusion Reactions: no  Antibodies:none  H/O Acute Chest Syndrome: Yes  Last episode:9/05/22 (previously 4/26/21, 10/2019)   ICU/intubation: not with 9/2022 admission  H/O Stroke: Yes (managed with chronic transfusions in the past, stopped late Spring 2020)  H/O VTE: Yes (2/2021)  H/O Cholecystectomy or Splenectomy: no  H/O Asthma, Pulm HTN, AVN, Leg Ulcers, Nephropathy, Retinopathy, etc: Iron overload, asthma, chronic lung disease, physical limitations from early stroke    ---------------------------------------  Jennifer Cervantes's Goals (reviewed today 5/24/24)    1-3 month goal:  Continue to look for a job    6 month goal:      12 month goal:      Disease-specific goal(s):  Decrease frequency of ED visits. Decrease opioid qty per weekly refill. She'd like to get down to 5 tablets per week by October      Current Outpatient Medications   Medication Sig Dispense Refill    albuterol (PROVENTIL) (2.5 MG/3ML) 0.083% neb solution Take 2 vials (5 mg) by nebulization every 6 hours as needed for shortness of breath or wheezing 90 mL 3    aspirin (ASA) 81 MG chewable tablet Take 1 tablet (81 mg) by mouth 2 times daily 60 tablet 11    budesonide-formoterol (SYMBICORT) 160-4.5 MCG/ACT Inhaler Inhale 2 puffs twice daily plus 1-2 puffs as needed. May use up to 12 puffs per day. 20.4 g 11    deferasirox (JADENU) 360 MG tablet TAKE 4 TABLETS BY MOUTH EVERY EVENING 120 tablet 1    diphenhydrAMINE (BENADRYL) 50 MG capsule Take 1 capsule (50 mg) by mouth every 6 hours as needed for itching or allergies. Administer 12  hours pre - IV contrast injection and patient must have a . 1 capsule 0    EPINEPHrine (ANY BX GENERIC EQUIV) 0.3 MG/0.3ML injection 2-pack Inject 0.3 mLs (0.3 mg) into the muscle as needed for anaphylaxis. 1 each 1    gabapentin (NEURONTIN) 300 MG capsule Take 1 capsule (300 mg) by mouth 3 times  daily. Take PO 1 pill in evening (300 mg) TID to start. If tolerated, increase by 300 mg in morning or lunch every few days, updating your doctor's office in time to get refills. The maximum dose is 900 mg TID. 90 capsule 1    hydroxyurea (HYDREA) 500 MG capsule Take 6 capsules (3,000 mg) by mouth daily 180 capsule 11    ibuprofen (ADVIL/MOTRIN) 800 MG tablet Take 800 mg by mouth every 8 hours as needed for moderate pain      melatonin 5 MG tablet Take 1 tablet (5 mg) by mouth nightly as needed for sleep 30 tablet 1    methocarbamol (ROBAXIN) 750 MG tablet Take 1 tablet (750 mg) by mouth 4 times daily as needed for muscle spasms (during sickle pain crises. Okay to take scheduled while in pain) 60 tablet 1    methylPREDNISolone (MEDROL) 32 MG tablet Take 1 tablet (32 mg) by mouth daily. 2 hours prior to the procedure with IV contrast 1 tablet 0    naloxone (NARCAN) 4 MG/0.1ML nasal spray Spray 4 mg into one nostril alternating nostrils as needed for opioid reversal. every 2-3 minutes until assistance arrives 0.2 mL 0    ondansetron (ZOFRAN ODT) 8 MG ODT tab Take 1 tablet (8 mg) by mouth every 8 hours as needed for nausea 40 tablet 4    oxyCODONE IR (ROXICODONE) 10 MG tablet Take 1 tablet (10 mg) by mouth every 4 hours as needed for severe pain or breakthrough pain. Goal 4 per day. Max 6 per day. 15 tablet 0     Past Medical History  Past Medical History:   Diagnosis Date    Anxiety     Bleeding disorder (H)     Blood clotting disorder (H)     Cerebral infarction (H) 2015    Cognitive developmental delay     low IQ. 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 side (H)     right hand contractures    Iron overload due to repeated red blood cell transfusions     Migraines     Multiple subsegmental pulmonary emboli without acute cor pulmonale (H) 02/01/2021    Oppositional defiant behavior      Presence of intrauterine contraceptive device 2/18/2020    Superficial venous thrombosis of arm, right 03/25/2021    Uncomplicated asthma      Past Surgical History:   Procedure Laterality Date    AS INSERT TUNNELED CV 2 CATH W/O PORT/PUMP      CHOLECYSTECTOMY      CV RIGHT HEART CATH MEASUREMENTS RECORDED N/A 11/18/2021    Procedure: Right Heart Cath;  Surgeon: Jackson Stauffer MD;  Location:  HEART CARDIAC CATH LAB    INSERT PORT VASCULAR ACCESS Left 4/21/2021    Procedure: INSERTION, VASCULAR ACCESS PORT (NOT SURE ON SIDE UNTIL REMOVAL);  Surgeon: Rajan More MD;  Location: [facility] OR    IR CHEST PORT PLACEMENT > 5 YRS OF AGE  4/21/2021    IR CVC NON TUNNEL LINE REMOVAL  5/6/2021    IR CVC NON TUNNEL PLACEMENT > 5 YRS  04/07/2020    IR CVC NON TUNNEL PLACEMENT > 5 YRS  4/30/2021    IR CVC NON TUNNEL PLACEMENT > 5 YRS  9/7/2022    IR PORT REMOVAL LEFT  4/21/2021    REMOVE PORT VASCULAR ACCESS Left 4/21/2021    Procedure: REMOVAL, VASCULAR ACCESS PORT LEFT;  Surgeon: Rajan More MD;  Location: [facility] OR    REPAIR TENDON ELBOW Right 10/02/2019    Procedure: Right Elbow Flexor Lengthening, Flexor Pronator Slide Of Wrist and Finger, Thumb Adductor Lengthening;  Surgeon: Anai Franco MD;  Location: [facility] OR    TONSILLECTOMY Bilateral 10/02/2019    Procedure: Bilateral Tonsillectomy;  Surgeon: Farhana Guy MD;  Location: [facility] OR    ZZC BREAST REDUCTION (INCLUDES LIPO) TIER 3 Bilateral 04/23/2019     Allergies   Allergen Reactions    Contrast Dye Angioedema     Hives and breathing issues    Fish-Derived Products Hives    Seafood Hives    Adhesive Tape Hives     Primipore dressing causes hives    Gadolinium     Iodinated Contrast Media      Social History   Social History     Tobacco Use    Smoking status: Never     Passive exposure: Never    Smokeless tobacco: Never   Substance Use Topics    Alcohol use: Not Currently     Alcohol/week: 0.0 standard drinks of alcohol    Drug use: Never   Her mom  lives next door to her.  Past medical history and social history were reviewed.    Physical Examination:  /72 (BP Location: Left arm, Patient Position: Left side, Cuff Size: Adult Regular)   Pulse 92   Temp 98.2  F (36.8  C) (Oral)   Resp 16   Wt 70.9 kg (156 lb 3.2 oz)   SpO2 100%   BMI 26.81 kg/m        Wt Readings from Last 10 Encounters:   10/17/24 70.9 kg (156 lb 3.2 oz)   10/12/24 70.3 kg (155 lb)   10/11/24 71 kg (156 lb 9.6 oz)   10/04/24 68.9 kg (152 lb)   09/26/24 69.4 kg (152 lb 14.4 oz)   09/15/24 70.3 kg (155 lb)   09/13/24 70.7 kg (155 lb 14.4 oz)   09/10/24 70 kg (154 lb 6.4 oz)   08/31/24 68.5 kg (151 lb)   08/29/24 68 kg (150 lb)     Gen: alert, pleasant and conversational, NAD  HEENT: NC/AT,EOMI w/ PERRL, anicteric sclera.   Neck: Supraclavicular fullness on right side that is tender to light palpation; limited ROM due to pain  CV: normal S1,S2 with RRR no m/r/g  Resp: lungs CTA bilaterally with adequate air movement to bases. No wheezes or crackles  Ext: warm and well perfused, no edema or cyanosis  Skin: no concerning lesions or rashes  Neuro: A&Ox4, no lateralizing sx. Grossly nonfocal.  Psych: appropriate, reactive    Laboratory Data:  Most Recent 3 CBC's:  Recent Labs   Lab Test 10/16/24  1419 10/12/24  2122 10/11/24  1024   WBC 13.5* 19.8* 11.6*   HGB 6.6* 7.1* 7.7*   MCV 86 88 86   * 469* 498*   ANEUTAUTO 10.3* 15.7* 8.2    Most Recent 3 BMP's:  Recent Labs   Lab Test 10/12/24  2122 10/11/24  1024 10/04/24  1553 09/20/24  1554 09/13/24  1228 09/10/24  2341    138 140 138   < > 136   POTASSIUM 3.6 3.6 4.1 3.9   < > 3.9   CHLORIDE 102 105 106 106   < > 104   CO2 22 23 24 21*   < > 22   BUN 6.9 8.7 9.5 7.3   < > 6.3   CR 0.50* 0.49* 0.62 0.54   < > 0.63   ANIONGAP 11 10 10 11   < > 10   MICAH 8.3* 9.1 9.3 9.2   < > 8.8   GLC 94 93 95 86   < > 91   PROTTOTAL  --   --  7.5 7.8  --  7.8   ALBUMIN  --   --  4.7 4.8  --  4.5    < > = values in this interval not displayed.     Most Recent 2 LFT's:  Recent Labs   Lab Test 10/04/24  1553 09/20/24  1554   AST 45 50*   ALT 28 31   ALKPHOS 60 59   BILITOTAL 3.1* 4.1*    Most Recent TSH and T4:  Recent Labs   Lab Test 04/23/24  1754   TSH 0.60     Phos/Mag:  Lab Results   Component Value Date    PHOS 4.8 (H) 05/13/2023    PHOS 5.0 (H) 05/12/2023    PHOS 3.6 02/21/2021    MAG 2.0 05/16/2024    MAG 1.7 04/29/2024    MAG 1.9 04/28/2024      I reviewed the above labs today.    Assessment and Plan:  1. Sickle Cell HgbSS Disease  2. Acute pain crises  3. Chronic Pain  4. 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 History of CVA  7. Hearing loss  8. Nausea/vomiting       Jennifer is seen today for an acute visit to assess a painful lump on her neck.  Please see Patricia Mantilla's note from 10/11/24 for a more comprehensive review of her sickle cell plan.    Right-sided neck tenderness started after her vaccinations on 10/11/24.  She had a similar issue in April 2023, no associated vaccination at that time.  Soft tissue US in 2023 was unrevealing and pain resolved spontaneously in the following week.  Discussed that differential includes reactive lymph nodes from her recent vaccination which should resolve independently with time, but in this patient with underlying sickle cell disease and history of stroke, need to consider risk for thrombosis.  We discussed that since she intends to report to the ED following our appointment anyway, it may be most expedient for her to get imaging performed at the ED.  We also discussed the possibility of a blood transfusion for her hemoglobin of 6.6.  I will send a message to her primary team to follow-up with her tomorrow depending on her ED course.    49 minutes spent on the date of the encounter doing chart review, review of test results, interpretation of tests, patient visit, and documentation     The longitudinal plan of care for the diagnosis(es)/condition(s) as  documented were addressed during this visit. Due to the added complexity in care, I will continue to support Jennifer in the subsequent management and with ongoing continuity of care.    EITAN Ortega [facility] [address] [address] [phone]

## 2024-10-17 NOTE — NURSING NOTE
"Oncology Rooming Note    October 17, 2024 4:11 PM   Jennifer Cervantes is a 24 year old female who presents for:    Chief Complaint   Patient presents with    Oncology Clinic Visit     RTN for Sickle Cell Anemia     Initial Vitals: /72 (BP Location: Left arm, Patient Position: Left side, Cuff Size: Adult Regular)   Pulse 92   Temp 98.2  F (36.8  C) (Oral)   Resp 16   Wt 70.9 kg (156 lb 3.2 oz)   SpO2 100%   BMI 26.81 kg/m   Estimated body mass index is 26.81 kg/m  as calculated from the following:    Height as of 10/12/24: 1.626 m (5' 4\").    Weight as of this encounter: 70.9 kg (156 lb 3.2 oz). Body surface area is 1.79 meters squared.  Extreme Pain (8) Comment: Data Unavailable   No LMP recorded. Patient has had an implant.  Allergies reviewed: Yes  Medications reviewed: Yes    Medications: Medication refills not needed today.  Pharmacy name entered into [facility]:    [facility] - [place], [place] - [phone]  [facility] - [place], [place] - [address] [place] [address]    Frailty Screening:   Is the patient here for a new oncology consult visit in cancer care? 2. No      Clinical concerns: none       Lily Steiner MA             "

## 2024-10-17 NOTE — TELEPHONE ENCOUNTER
Jennifer calls in stating she would like to go to [facility] for infusion. She has a 4:30 appt with Jalyn Antony at [facility].   Informed Jennifer that I would check with Jalyn to see if OK to do VV     Per Jalyn Antony The preference would be to do in person - it's hard to assess a lump over video. It's not overly urgent, so if she can get into [place] today and is able to come see Patricia Mantilla for one of her in person openings tomorrow, that would be an okay plan.     Call placed to [facility] to see if they have any openings this afternoon for PM for a sickle cell patient. Patient would not be able to be at [facility] for 90 minutes due to transportation. [facility] is unable to provide an infusion spot today.     Call placed to Jennifer to let her know she was unable to get in for a infusion today and should keep her appt with Jalyn Antony today at 4:30.

## 2024-10-17 NOTE — ED TRIAGE NOTES
Swelling in right neck area, saw provider who recommend pt comes to ED to get ultrasound. Having sickle cell crisis pain which is in her neck.      Triage Assessment (Adult)       Row Name 10/17/24 8519          Triage Assessment    Airway WDL WDL        Respiratory WDL    Respiratory WDL WDL        Skin Circulation/Temperature WDL    Skin Circulation/Temperature WDL WDL        Cardiac WDL    Cardiac WDL WDL        Peripheral/Neurovascular WDL    Peripheral Neurovascular WDL WDL        Cognitive/Neuro/Behavioral WDL    Cognitive/Neuro/Behavioral WDL WDL

## 2024-10-17 NOTE — TELEPHONE ENCOUNTER
Oncology Nurse Triage - Sickle Cell Pain Crisis:    Situation: Jennifer  calling about Sickle Cell Pain Crisis, requesting to be added on for IV fluids and pain medicine    Background:     Patient's last infusion was 10/14/24   Last clinic visit date:10/11/24 w/Patricia Mantilla  Does patient have active treatment plan?  Yes      Assessment of Symptoms:  Onset/Duration of symptoms: 2 day    Is it typical sickle cell pain? Yes   Location: above neck, where collarbone  Character: Sharp           Intensity: 10/10    Any radiation of pain, numbness, tingling, weakness, warmth, swelling, discoloration of arms or legs?No     Fever?No  (if yes max temperature recorded in last 24 hours):      Chest Pain Present: No     Shortness of breath: No     Other home therapies tried: HEAT/HEATING PAD and Hot showers      Last home medication taken and when: 0600 ibuprofen and oxycodone    Any Refills Needed?: No     Does patient have transportation & length of time to get to clinic: Yes, she can find transportation if something opens up early this morning. If appt available later in the day she would like assistance with transportation.        Recommendations:     If you do not hear from the infusion center by 2pm then you will not be able to get in for an infusion today. If symptoms worsen while waiting for call back, and/or you experience fever, chills, SOB, chest pain, cough, n/v, dizziness, numbness, swelling, discoloration of extremities, then seek emergency evaluation in Emergency Department.       [id number] Secure message to Patricia Mantilla asking for approval to add on to wait list for pain meds.    0750 approved by Patricia.

## 2024-10-17 NOTE — ED NOTES
Bed: ED07  Expected date: 10/17/24  Expected time: 5:29 PM  Means of arrival:   Comments:  Triage TJOE

## 2024-10-18 ENCOUNTER — NURSE TRIAGE (OUTPATIENT)
Dept: ONCOLOGY | Facility: CLINIC | Age: 24
End: 2024-10-18
Payer: COMMERCIAL

## 2024-10-18 ENCOUNTER — PATIENT OUTREACH (OUTPATIENT)
Dept: ONCOLOGY | Facility: CLINIC | Age: 24
End: 2024-10-18
Payer: COMMERCIAL

## 2024-10-18 VITALS
DIASTOLIC BLOOD PRESSURE: 71 MMHG | BODY MASS INDEX: 26.61 KG/M2 | OXYGEN SATURATION: 93 % | HEART RATE: 84 BPM | TEMPERATURE: 97.9 F | WEIGHT: 155 LBS | SYSTOLIC BLOOD PRESSURE: 109 MMHG | RESPIRATION RATE: 16 BRPM

## 2024-10-18 DIAGNOSIS — D57.1 HB-SS DISEASE WITHOUT CRISIS (H): Primary | ICD-10-CM

## 2024-10-18 PROCEDURE — 250N000011 HC RX IP 250 OP 636: Performed by: STUDENT IN AN ORGANIZED HEALTH CARE EDUCATION/TRAINING PROGRAM

## 2024-10-18 RX ORDER — HEPARIN SODIUM (PORCINE) LOCK FLUSH IV SOLN 100 UNIT/ML 100 UNIT/ML
100 SOLUTION INTRAVENOUS ONCE
Status: COMPLETED | OUTPATIENT
Start: 2024-10-18 | End: 2024-10-18

## 2024-10-18 RX ADMIN — HEPARIN 100 UNITS: 100 SYRINGE at 01:13

## 2024-10-18 ASSESSMENT — ACTIVITIES OF DAILY LIVING (ADL): ADLS_ACUITY_SCORE: 37

## 2024-10-18 NOTE — PROGRESS NOTES
[facility]: Cancer Care Follow-Up Note                                    Discussion with Patient:                                                      Spoke with pt after her ED visit.  Pt's Hgb 6.7.  She is interested in getting a transfusion, but not emergent.  ED did not run type and screen yesterday, so unable to transfuse pt without redrawing labs.  She states she is feeling better today.  The lump on her neck seems smaller to her today too.  She will see Dr Duncan on Monday.  Plan for now will be to have labs drawn on Monday and writer will have her put on transfusion waitlist.  Pt will report to ED over the weekend if feeling worse.  Pt stated understanding.            Goals          General     Pain Management (pt-stated)      Notes - Note created  10/13/2023  3:51 PM by Arely Krueger RN     Goal Statement: I will establish a plan for preventing and managing pain.  Date Goal set: 10/13/2023  Barriers: disease burden, multiple diagnoses, and transportation  Strengths: motivation, health awareness, and involvement with care team  Date to Achieve By: ongoing  Patient expressed understanding of goal: Yes  Action steps to achieve this goal:  I will take medications as prescribed.   I will call triage with new or worsening pain not controlled by medication.   I will use alternative therapies as directed. (Ice, heat, massage, etc)   I will establish care will palliative care department for ongoing pain management as needed.   I will call triage for medication refills when there are 2-3 days of medication remaining.                 Dates of Treatment:                                                      Infusion given in last 28 days       None            Assessment:                                                        Plan of Care Education Review:   Assessment completed with:: Patient    Plan of Care Education   Diagnosis:: Sickle Cell Disease  Does patient understand diagnosis?: Yes  Tx plan/regimen::  blood transfusion  Does patient understand treatment plan/regimen?: Yes  Procedure education provided for: : Blood product transfusion    Evaluation of Learning  Patient Education Provided: Yes  Readiness:: Acceptance  Method:: Explanation  Response:: Verbalizes understanding           Intervention/Education provided during outreach:                                                           Patient to follow up as scheduled at next appt  Patient to call/[employer] message with updates  Confirmed patient has clinic and triage numbers    Signature:  Arely Krueger RN

## 2024-10-18 NOTE — DISCHARGE INSTRUCTIONS
You were seen in the emergency department due to a flare of your sickle cell crisis as well as some swelling and pain around the right side of your neck. Ultrasound shows that this is either a cyst or inflamed lymph node. CT scan seems most consistent with inflamed lymph nodes. With you recently having gotten your COVID and influenza shots, it is likely that they are inflamed in the setting of this, but they have been enlarged for a number of years.     We would recommend that you follow-up with your primary care doctor to discuss whether or not you would need biopsies of the sites in the next couple of weeks.    Return to the emergency department with any worsening severe difficulty breathing, fevers or any other concerning symptoms

## 2024-10-18 NOTE — TELEPHONE ENCOUNTER
Wondering if anyone had looked at her labs and er visit note?     Wondering if she needs a transfusion?

## 2024-10-18 NOTE — PATIENT INSTRUCTIONS
"Recommendations from today's MTM visit:                                                    MTM (medication therapy management) is a service provided by a clinical pharmacist designed to help you get the most of out of your medicines.   Today we reviewed what your medicines are for, how to know if they are working, that your medicines are safe and how to make your medicine regimen as easy as possible.      Resume aspirin 81 mg twice daily  Use Symbicort inhaler twice daily AND as needed (stop using albuterol as needed).  I sent an EpiPen refill to your pharmacy    Follow-up: via phone on  Thursday Nov 14, 2024   Appt at 11:00 AM (30 min)  Telephone      It was great speaking with you today.  I value your experience and would be very thankful for your time in providing feedback in our clinic survey. In the next few days, you may receive an email or text message from [employer] with a link to a survey related to your  clinical pharmacist.\"     To schedule another MTM appointment, please call the clinic directly or you may call the MTM scheduling line at [phone] or toll-free at [phone].     My Clinical Pharmacist's contact information:                                                      Please feel free to contact me with any questions or concerns you have.      Gail Louis, PharmD  Medication Therapy Management Pharmacist    "

## 2024-10-20 LAB
ABO/RH(D): NORMAL
ANTIBODY SCREEN: NEGATIVE
BLD PROD TYP BPU: NORMAL
BLOOD COMPONENT TYPE: NORMAL
CODING SYSTEM: NORMAL
CROSSMATCH: NORMAL
ISSUE DATE AND TIME: NORMAL
SPECIMEN EXPIRATION DATE: NORMAL
UNIT ABO/RH: NORMAL
UNIT NUMBER: NORMAL
UNIT STATUS: NORMAL
UNIT TYPE ISBT: 9500

## 2024-10-20 RX ORDER — HEPARIN SODIUM (PORCINE) LOCK FLUSH IV SOLN 100 UNIT/ML 100 UNIT/ML
5 SOLUTION INTRAVENOUS
OUTPATIENT
Start: 2024-10-20

## 2024-10-20 RX ORDER — HEPARIN SODIUM,PORCINE 10 UNIT/ML
5 VIAL (ML) INTRAVENOUS
OUTPATIENT
Start: 2024-10-20

## 2024-10-20 NOTE — PROGRESS NOTES
This is a recent snapshot of the patient's [facility] medical record.  For current drug dose and complete information and questions, call [phone] or In Basket pool, [facility] home infusion ([id number])  CSN Number:  [id number]       LDR 5

## 2024-10-21 ENCOUNTER — INFUSION THERAPY VISIT (OUTPATIENT)
Dept: TRANSPLANT | Facility: CLINIC | Age: 24
End: 2024-10-21
Attending: PEDIATRICS
Payer: COMMERCIAL

## 2024-10-21 ENCOUNTER — APPOINTMENT (OUTPATIENT)
Dept: LAB | Facility: CLINIC | Age: 24
End: 2024-10-21
Payer: COMMERCIAL

## 2024-10-21 ENCOUNTER — ONCOLOGY VISIT (OUTPATIENT)
Dept: ONCOLOGY | Facility: CLINIC | Age: 24
End: 2024-10-21
Payer: COMMERCIAL

## 2024-10-21 ENCOUNTER — NURSE TRIAGE (OUTPATIENT)
Dept: ONCOLOGY | Facility: CLINIC | Age: 24
End: 2024-10-21
Payer: COMMERCIAL

## 2024-10-21 VITALS
HEART RATE: 86 BPM | OXYGEN SATURATION: 99 % | SYSTOLIC BLOOD PRESSURE: 104 MMHG | WEIGHT: 153.6 LBS | BODY MASS INDEX: 26.37 KG/M2 | TEMPERATURE: 97.7 F | DIASTOLIC BLOOD PRESSURE: 61 MMHG | RESPIRATION RATE: 16 BRPM

## 2024-10-21 DIAGNOSIS — G81.10 SPASTIC HEMIPLEGIA, UNSPECIFIED ETIOLOGY, UNSPECIFIED LATERALITY (H): ICD-10-CM

## 2024-10-21 DIAGNOSIS — D57.1 HB-SS DISEASE WITHOUT CRISIS (H): ICD-10-CM

## 2024-10-21 DIAGNOSIS — E83.111 IRON OVERLOAD DUE TO REPEATED RED BLOOD CELL TRANSFUSIONS: ICD-10-CM

## 2024-10-21 DIAGNOSIS — D57.00 SICKLE CELL PAIN CRISIS (H): ICD-10-CM

## 2024-10-21 DIAGNOSIS — Z86.73 HISTORY OF STROKE: Primary | ICD-10-CM

## 2024-10-21 DIAGNOSIS — J45.909 ASTHMATIC BRONCHITIS WITHOUT COMPLICATION, UNSPECIFIED ASTHMA SEVERITY, UNSPECIFIED WHETHER PERSISTENT: ICD-10-CM

## 2024-10-21 LAB
BASOPHILS # BLD AUTO: 0.3 10E3/UL (ref 0–0.2)
BASOPHILS NFR BLD AUTO: 3 %
EOSINOPHIL # BLD AUTO: 0.3 10E3/UL (ref 0–0.7)
EOSINOPHIL NFR BLD AUTO: 4 %
ERYTHROCYTE [DISTWIDTH] IN BLOOD BY AUTOMATED COUNT: 22.7 % (ref 10–15)
HCT VFR BLD AUTO: 22.4 % (ref 35–47)
HGB BLD-MCNC: 7.3 G/DL (ref 11.7–15.7)
IMM GRANULOCYTES # BLD: 0 10E3/UL
IMM GRANULOCYTES NFR BLD: 0 %
LYMPHOCYTES # BLD AUTO: 2.1 10E3/UL (ref 0.8–5.3)
LYMPHOCYTES NFR BLD AUTO: 22 %
MCH RBC QN AUTO: 27.7 PG (ref 26.5–33)
MCHC RBC AUTO-ENTMCNC: 32.6 G/DL (ref 31.5–36.5)
MCV RBC AUTO: 85 FL (ref 78–100)
MONOCYTES # BLD AUTO: 0.5 10E3/UL (ref 0–1.3)
MONOCYTES NFR BLD AUTO: 5 %
NEUTROPHILS # BLD AUTO: 6.4 10E3/UL (ref 1.6–8.3)
NEUTROPHILS NFR BLD AUTO: 67 %
NRBC # BLD AUTO: 0.1 10E3/UL
NRBC BLD AUTO-RTO: 1 /100
PLATELET # BLD AUTO: 753 10E3/UL (ref 150–450)
RBC # BLD AUTO: 2.64 10E6/UL (ref 3.8–5.2)
WBC # BLD AUTO: 9.6 10E3/UL (ref 4–11)

## 2024-10-21 PROCEDURE — 99215 OFFICE O/P EST HI 40 MIN: CPT | Performed by: PEDIATRICS

## 2024-10-21 PROCEDURE — 36591 DRAW BLOOD OFF VENOUS DEVICE: CPT

## 2024-10-21 PROCEDURE — 250N000011 HC RX IP 250 OP 636: Performed by: PEDIATRICS

## 2024-10-21 PROCEDURE — 96361 HYDRATE IV INFUSION ADD-ON: CPT

## 2024-10-21 PROCEDURE — P9016 RBC LEUKOCYTES REDUCED: HCPCS | Performed by: PEDIATRICS

## 2024-10-21 PROCEDURE — 86901 BLOOD TYPING SEROLOGIC RH(D): CPT

## 2024-10-21 PROCEDURE — 96376 TX/PRO/DX INJ SAME DRUG ADON: CPT

## 2024-10-21 PROCEDURE — G2211 COMPLEX E/M VISIT ADD ON: HCPCS | Performed by: PEDIATRICS

## 2024-10-21 PROCEDURE — 96375 TX/PRO/DX INJ NEW DRUG ADDON: CPT

## 2024-10-21 PROCEDURE — 258N000003 HC RX IP 258 OP 636: Performed by: PEDIATRICS

## 2024-10-21 PROCEDURE — 86900 BLOOD TYPING SEROLOGIC ABO: CPT

## 2024-10-21 PROCEDURE — 99211 OFF/OP EST MAY X REQ PHY/QHP: CPT | Performed by: PEDIATRICS

## 2024-10-21 PROCEDURE — 86923 COMPATIBILITY TEST ELECTRIC: CPT | Performed by: PEDIATRICS

## 2024-10-21 PROCEDURE — 96374 THER/PROPH/DIAG INJ IV PUSH: CPT

## 2024-10-21 PROCEDURE — 85004 AUTOMATED DIFF WBC COUNT: CPT

## 2024-10-21 PROCEDURE — 250N000013 HC RX MED GY IP 250 OP 250 PS 637: Performed by: PEDIATRICS

## 2024-10-21 RX ORDER — ONDANSETRON 2 MG/ML
8 INJECTION INTRAMUSCULAR; INTRAVENOUS EVERY 6 HOURS PRN
Status: DISCONTINUED | OUTPATIENT
Start: 2024-10-21 | End: 2024-10-21 | Stop reason: HOSPADM

## 2024-10-21 RX ORDER — HEPARIN SODIUM (PORCINE) LOCK FLUSH IV SOLN 100 UNIT/ML 100 UNIT/ML
5 SOLUTION INTRAVENOUS
OUTPATIENT
Start: 2024-10-21

## 2024-10-21 RX ORDER — DIPHENHYDRAMINE HCL 25 MG
50 CAPSULE ORAL
Status: COMPLETED | OUTPATIENT
Start: 2024-10-21 | End: 2024-10-21

## 2024-10-21 RX ORDER — HEPARIN SODIUM (PORCINE) LOCK FLUSH IV SOLN 100 UNIT/ML 100 UNIT/ML
5 SOLUTION INTRAVENOUS
Status: CANCELLED | OUTPATIENT
Start: 2025-01-01

## 2024-10-21 RX ORDER — KETOROLAC TROMETHAMINE 30 MG/ML
30 INJECTION, SOLUTION INTRAMUSCULAR; INTRAVENOUS ONCE
Status: COMPLETED | OUTPATIENT
Start: 2024-10-21 | End: 2024-10-21

## 2024-10-21 RX ORDER — ALBUTEROL SULFATE 0.83 MG/ML
5 SOLUTION RESPIRATORY (INHALATION) EVERY 6 HOURS PRN
Qty: 90 ML | Refills: 3 | Status: SHIPPED | OUTPATIENT
Start: 2024-10-21

## 2024-10-21 RX ORDER — ONDANSETRON 4 MG/1
8 TABLET, FILM COATED ORAL
Status: CANCELLED
Start: 2025-01-01

## 2024-10-21 RX ORDER — KETOROLAC TROMETHAMINE 30 MG/ML
30 INJECTION, SOLUTION INTRAMUSCULAR; INTRAVENOUS ONCE
Status: CANCELLED
Start: 2025-01-01 | End: 2025-01-01

## 2024-10-21 RX ORDER — ONDANSETRON 2 MG/ML
8 INJECTION INTRAMUSCULAR; INTRAVENOUS EVERY 6 HOURS PRN
Status: CANCELLED
Start: 2025-01-01

## 2024-10-21 RX ORDER — OXYCODONE HYDROCHLORIDE 10 MG/1
10 TABLET ORAL EVERY 6 HOURS PRN
Qty: 10 TABLET | Refills: 0 | Status: SHIPPED | OUTPATIENT
Start: 2024-10-21 | End: 2024-10-28

## 2024-10-21 RX ORDER — HEPARIN SODIUM,PORCINE 10 UNIT/ML
5 VIAL (ML) INTRAVENOUS
OUTPATIENT
Start: 2024-10-21

## 2024-10-21 RX ORDER — DEFERASIROX 360 MG/1
1440 TABLET, FILM COATED ORAL DAILY
Qty: 120 TABLET | Refills: 11 | Status: SHIPPED | OUTPATIENT
Start: 2024-10-21

## 2024-10-21 RX ORDER — HEPARIN SODIUM,PORCINE 10 UNIT/ML
5 VIAL (ML) INTRAVENOUS
Status: CANCELLED | OUTPATIENT
Start: 2025-01-01

## 2024-10-21 RX ORDER — DIPHENHYDRAMINE HCL 25 MG
50 CAPSULE ORAL
Status: CANCELLED
Start: 2025-01-01

## 2024-10-21 RX ORDER — HEPARIN SODIUM (PORCINE) LOCK FLUSH IV SOLN 100 UNIT/ML 100 UNIT/ML
5 SOLUTION INTRAVENOUS ONCE
Status: COMPLETED | OUTPATIENT
Start: 2024-10-21 | End: 2024-10-21

## 2024-10-21 RX ORDER — METHOCARBAMOL 750 MG/1
750 TABLET, FILM COATED ORAL 4 TIMES DAILY PRN
Qty: 60 TABLET | Refills: 1 | Status: SHIPPED | OUTPATIENT
Start: 2024-10-21

## 2024-10-21 RX ADMIN — DIPHENHYDRAMINE HYDROCHLORIDE 50 MG: 25 CAPSULE ORAL at 14:35

## 2024-10-21 RX ADMIN — HYDROMORPHONE HYDROCHLORIDE 1 MG: 1 INJECTION, SOLUTION INTRAMUSCULAR; INTRAVENOUS; SUBCUTANEOUS at 15:32

## 2024-10-21 RX ADMIN — HYDROMORPHONE HYDROCHLORIDE 1 MG: 1 INJECTION, SOLUTION INTRAMUSCULAR; INTRAVENOUS; SUBCUTANEOUS at 16:31

## 2024-10-21 RX ADMIN — KETOROLAC TROMETHAMINE 30 MG: 30 INJECTION, SOLUTION INTRAMUSCULAR; INTRAVENOUS at 14:35

## 2024-10-21 RX ADMIN — HYDROMORPHONE HYDROCHLORIDE 1 MG: 1 INJECTION, SOLUTION INTRAMUSCULAR; INTRAVENOUS; SUBCUTANEOUS at 14:34

## 2024-10-21 RX ADMIN — SODIUM CHLORIDE, POTASSIUM CHLORIDE, SODIUM LACTATE AND CALCIUM CHLORIDE 1000 ML: 600; 310; 30; 20 INJECTION, SOLUTION INTRAVENOUS at 14:35

## 2024-10-21 RX ADMIN — Medication 5 ML: at 13:32

## 2024-10-21 RX ADMIN — ONDANSETRON 8 MG: 2 INJECTION INTRAMUSCULAR; INTRAVENOUS at 14:35

## 2024-10-21 ASSESSMENT — PAIN SCALES - GENERAL: PAINLEVEL: EXTREME PAIN (9)

## 2024-10-21 NOTE — NURSING NOTE
Chief Complaint   Patient presents with    Port Draw     Labs drawn via port access by lab RN       Port blood draw with heparin flush by lab RN. Vitals taken and appointment arrived. Provider agreed to see pt in infusion today at pt request. IB sent to infusion.    Acacia Mohr RN

## 2024-10-21 NOTE — TELEPHONE ENCOUNTER
Oncology Nurse Triage - Sickle Cell Pain Crisis:    Situation: Jennifer  calling about Sickle Cell Pain Crisis, requesting to be added on for IV fluids and pain medicine    Background:     Patient's last infusion was 10/11/24   Last clinic visit date:10/17/24 Jalyn Antony   Does patient have active treatment plan?  Yes      Assessment of Symptoms:  Onset/Duration of symptoms: 4 day    Is it typical sickle cell pain? Yes   Location: generalized   Character: Dull ache           Intensity: 8/10    Any radiation of pain, numbness, tingling, weakness, warmth, swelling, discoloration of arms or legs?No     Fever?No  (if yes max temperature recorded in last 24 hours):      Chest Pain Present: No     Shortness of breath: No     Other home therapies tried: HEAT/HEATING PAD and WARM BATH     Last home medication taken and when: oxycodone and ibuprofen at 6am     Any Refills Needed?:Yes Oxycodone     Does patient have transportation & length of time to get to clinic: Yes         Recommendations:     Has 12:30 labs, 1:00 DR Duncan and 2:00pm possible transfusion appt   Placed on infusion call list     If you do not hear from the infusion center by 2pm then you will not be able to get in for an infusion today. If symptoms worsen while waiting for call back, and/or you experience fever, chills, SOB, chest pain, cough, n/v, dizziness, numbness, swelling, discoloration of extremities, then seek emergency evaluation in Emergency Department.     Please note, if you are late for your appt, you risk losing your infusion appt as it may delay another patient's infusion who arrived on time.

## 2024-10-21 NOTE — LETTER
10/21/2024      Jennifer Cervantes  [address]  [facility] [address]      Dear Colleague,    Thank you for referring your patient, Jennifer Cervantes, to the [facility]. Please see a copy of my visit note below.    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 She had multiple thromboembolic events in 2021 despite adherent anticoagulation use (though warfarin was perpetually low) and there are concerns for chronic thromboembolic disease but did not have pulmonary HTN on a November 2021 cath. She is maintained on chronic PO opioids and twice-weekly infusion visits (since 1/24/22) but has been able to be maintained on this regimen and has stayed out of the ED most of the time with even rarer admissions for most of 2023. In 2024, her ED visits have increased somewhat but admissions remain rare.    Interval History:  Jennifer is seen for routine hematology follow-up today. She was last seen in clinic with neck swelling and was evaluated in the ED and it appears to have been a reactive lymph node. The swelling has resolved since it was evaluated. She was not having much pain with it but a bit of neck tightness.     She said things at home are going reasonably well. She is doing well with her mom and they are taking care of her new niece while her sister figures things out. She did suggest that the amount of work it takes for her infant niece makes her not want kids of her own. She has some mild pain today and is being seen in infusion but wants  to go down today on her oxycodone to 10 tabs/week, which is a great continued improvement. She is still dating her boyfriend, now going on about 2 years. She is not interested in pursuing work again right now but maybe in the future.          Sickle Cell Disease Comprehensive Checklist  Bone Health/Avascular Necrosis Screening/Symptoms (each visit): no new concerns today  Leg Ulcer evaluation (every visit): nothing to note  Hypertension (every visit):stable none for several months  Last pulmonary evaluation (asthma, AMAN, pulm HTN): 9/28/22, due for follow-up  Stroke/silent cerebral infarct Hx (Y/N): Yes TIA ~2014, first event ~age 1 with full stroke and R sided weakness  Last PCP Visit: 9/30/24 with Dr. Case  Vaccines:  PCV13: 5/13/19  Pneumovax (PPSV23): 3/04, 10/09, 7/12/19, 10/2024, due in 2029  Menactra: 4/2010, 9/2015 (MCV done 8/16/21)  MenB: 9/16/15, 5/13/19 (due in 2024)  Influenza: 10/11/24  Audiology (chelation): done 2/27/24    Comparison to Previous Audiogram dated 6/6/2022:     Pure Tone Thresholds 250-8000 Hz:    RIGHT: stable    LEFT: stable     High Frequency Audiometry 9,000-16,000Hz:     RIGHT: stable    LEFT: stable     Word Recognition Score:    RIGHT: stable    LEFT: stable    Plan last reviewed with patient: 10/2/23    Patient background: 24 yo F, enjoys movies and kids though there are times where she does not really want to talk to people. Does not have a lot of social support at home.     Sickle Cell Disease History  Primary Hematologist Team: Jose Rafael Duncan  PCP: none  Genotype: SS  Acute Pain Crisis Treatment: (limiting IV)   ER   Dilaudid 1 mg IV q1h up to 3 doses  Toradol 30 mg IV x1   Maintenance IV fluids with LR  Other: Zofran 8 mg IV PRN nausea  Inpatient:  PCA Dilaudid 1 hr q30 minutes, no basal rate  Toradol 30 mg x6h x 4 hr  LR maintenance x 1-2 days  Other Medications: Zofran  ASA  Supportive Care: Docusate, Senna  Chronic Pain Medications:    Home regimen: oxycodone 15 mg  p.o. q.4-6 hours p.r.n. breakthrough pain.  She also continues on Voltaren gel, and Zoloft among other medications.    -Also benefits from mental health visits, acupuncture  Baseline Hemoglobin: 7 g/dl without chronic transfusions  Hydroxyurea use: Yes  H/O blood transfusions: Yes, several (iron overload) Most recent 11/20/2021  H/O Transfusion Reactions: no  Antibodies:none  H/O Acute Chest Syndrome: Yes  Last episode:9/05/22 (previously 4/26/21, 10/2019)   ICU/intubation: not with 9/2022 admission  H/O Stroke: Yes (managed with chronic transfusions in the past, stopped late Spring 2020)  H/O VTE: Yes (2/2021)  H/O Cholecystectomy or Splenectomy: no  H/O Asthma, Pulm HTN, AVN, Leg Ulcers, Nephropathy, Retinopathy, etc: Iron overload, asthma, chronic lung disease, physical limitations from early stroke    ---------------------------------------  Jennifer Cervantes's Goals (reviewed today 10/21/24)    1-3 month goal:  Continue to look for a job (but not urgent)    6 month goal:      12 month goal:      Disease-specific goal(s):  Decrease frequency of ED visits. Decrease opioid qty per weekly refill. She'd like to get down to 5 tablets per week by October--may be extended a bit      Current Outpatient Medications   Medication Sig Dispense Refill     albuterol (PROVENTIL) (2.5 MG/3ML) 0.083% neb solution Take 2 vials (5 mg) by nebulization every 6 hours as needed for shortness of breath or wheezing. 90 mL 3     deferasirox (JADENU) 360 MG tablet Take 4 tablets (1,440 mg) by mouth daily. 120 tablet 11     Deferiprone, Twice Daily, 1000 MG TABS Take 2,000 mg by mouth 2 times daily. 150 tablet 3     methocarbamol (ROBAXIN) 750 MG tablet Take 1 tablet (750 mg) by mouth 4 times daily as needed for muscle spasms (during sickle pain crises. Okay to take scheduled while in pain). 60 tablet 1     aspirin (ASA) 81 MG chewable tablet Take 1 tablet (81 mg) by mouth 2 times daily 60 tablet 11     budesonide-formoterol (SYMBICORT)  160-4.5 MCG/ACT Inhaler Inhale 2 puffs twice daily plus 1-2 puffs as needed. May use up to 12 puffs per day. 20.4 g 11     diphenhydrAMINE (BENADRYL) 50 MG capsule Take 1 capsule (50 mg) by mouth every 6 hours as needed for itching or allergies. Administer 12  hours pre - IV contrast injection and patient must have a . 1 capsule 0     EPINEPHrine (ANY BX GENERIC EQUIV) 0.3 MG/0.3ML injection 2-pack Inject 0.3 mLs (0.3 mg) into the muscle as needed for anaphylaxis. 1 each 1     gabapentin (NEURONTIN) 300 MG capsule Take 1 capsule (300 mg) by mouth 3 times daily. Take PO 1 pill in evening (300 mg) TID to start. If tolerated, increase by 300 mg in morning or lunch every few days, updating your doctor's office in time to get refills. The maximum dose is 900 mg TID. (Patient taking differently: Take 600 mg by mouth at bedtime. Take PO 1 pill in evening (300 mg) TID to start. If tolerated, increase by 300 mg in morning or lunch every few days, updating your doctor's office in time to get refills. The maximum dose is 900 mg TID.) 90 capsule 1     hydroxyurea (HYDREA) 500 MG capsule Take 6 capsules (3,000 mg) by mouth daily 180 capsule 11     ibuprofen (ADVIL/MOTRIN) 800 MG tablet Take 800 mg by mouth every 8 hours as needed for moderate pain       melatonin 5 MG tablet Take 1 tablet (5 mg) by mouth nightly as needed for sleep 30 tablet 1     methylPREDNISolone (MEDROL) 32 MG tablet Take 1 tablet (32 mg) by mouth daily. 2 hours prior to the procedure with IV contrast 1 tablet 0     naloxone (NARCAN) 4 MG/0.1ML nasal spray Spray 4 mg into one nostril alternating nostrils as needed for opioid reversal. every 2-3 minutes until assistance arrives 0.2 mL 0     ondansetron (ZOFRAN ODT) 8 MG ODT tab Take 1 tablet (8 mg) by mouth every 8 hours as needed for nausea 40 tablet 4     oxyCODONE IR (ROXICODONE) 10 MG tablet Take 1 tablet (10 mg) by mouth every 6 hours as needed for severe pain or breakthrough pain. Goal 4 per day.  Max 6 per day. 10 tablet 0     Past Medical History  Past Medical History:   Diagnosis Date     Anxiety      Bleeding disorder (H)      Blood clotting disorder (H)      Cerebral infarction (H) 2015     Cognitive developmental delay     low IQ. Please recognize when managing pain and planning with her     Depressive disorder      Hemiplegia and hemiparesis following cerebral infarction affecting right dominant side (H)     right hand contractures     Iron overload due to repeated red blood cell transfusions      Migraines      Multiple subsegmental pulmonary emboli without acute cor pulmonale (H) 02/01/2021     Oppositional defiant behavior      Presence of intrauterine contraceptive device 2/18/2020     Superficial venous thrombosis of arm, right 03/25/2021     Uncomplicated asthma      Past Surgical History:   Procedure Laterality Date     AS INSERT TUNNELED CV 2 CATH W/O PORT/PUMP       CHOLECYSTECTOMY       CV RIGHT HEART CATH MEASUREMENTS RECORDED N/A 11/18/2021    Procedure: Right Heart Cath;  Surgeon: Jackson Stauffer MD;  Location:  HEART CARDIAC CATH LAB     INSERT PORT VASCULAR ACCESS Left 4/21/2021    Procedure: INSERTION, VASCULAR ACCESS PORT (NOT SURE ON SIDE UNTIL REMOVAL);  Surgeon: Rajan More MD;  Location: [facility] OR     IR CHEST PORT PLACEMENT > 5 YRS OF AGE  4/21/2021     IR CVC NON TUNNEL LINE REMOVAL  5/6/2021     IR CVC NON TUNNEL PLACEMENT > 5 YRS  04/07/2020     IR CVC NON TUNNEL PLACEMENT > 5 YRS  4/30/2021     IR CVC NON TUNNEL PLACEMENT > 5 YRS  9/7/2022     IR PORT REMOVAL LEFT  4/21/2021     REMOVE PORT VASCULAR ACCESS Left 4/21/2021    Procedure: REMOVAL, VASCULAR ACCESS PORT LEFT;  Surgeon: Rajan More MD;  Location: [facility] OR     REPAIR TENDON ELBOW Right 10/02/2019    Procedure: Right Elbow Flexor Lengthening, Flexor Pronator Slide Of Wrist and Finger, Thumb Adductor Lengthening;  Surgeon: Anai Franco MD;  Location: [facility] OR     TONSILLECTOMY Bilateral 10/02/2019     Procedure: Bilateral Tonsillectomy;  Surgeon: Farhana Guy MD;  Location:  OR     [facility] BREAST REDUCTION (INCLUDES LIPO) TIER 3 Bilateral 04/23/2019     Allergies   Allergen Reactions     Contrast Dye Angioedema     Hives and breathing issues     Fish-Derived Products Hives     Seafood Hives     Adhesive Tape Hives     Primipore dressing causes hives     Gadolinium      Iodinated Contrast Media      Social History   Social History     Tobacco Use     Smoking status: Never     Passive exposure: Never     Smokeless tobacco: Never   Substance Use Topics     Alcohol use: Not Currently     Alcohol/week: 0.0 standard drinks of alcohol     Drug use: Never   Her mom lives next door to her.  Past medical history and social history were reviewed.    Physical Examination:  There were no vitals taken for this visit. (Vitals from infusion reviewed)      Wt Readings from Last 10 Encounters:   10/21/24 69.7 kg (153 lb 9.6 oz)   10/17/24 70.3 kg (155 lb)   10/17/24 70.9 kg (156 lb 3.2 oz)   10/12/24 70.3 kg (155 lb)   10/11/24 71 kg (156 lb 9.6 oz)   10/04/24 68.9 kg (152 lb)   09/26/24 69.4 kg (152 lb 14.4 oz)   09/15/24 70.3 kg (155 lb)   09/13/24 70.7 kg (155 lb 14.4 oz)   09/10/24 70 kg (154 lb 6.4 oz)     General: Pleasant female, NAD, in a good mood today  Eyes: EOMI, PERRL  Respiratory: Normal effort, no adventitious breath sounds  Ext: no peripheral edema  Neurologic: chronic contracture of right arm and wrist. Did not observe gait today. A/O x 4.  Skin: No rashes, petechiae, or bruising noted on exposed skin.   Laboratory Data:  Recent Results (from the past 240 hour(s))   Basic metabolic panel    Collection Time: 10/12/24  9:22 PM   Result Value Ref Range    Sodium 135 135 - 145 mmol/L    Potassium 3.6 3.4 - 5.3 mmol/L    Chloride 102 98 - 107 mmol/L    Carbon Dioxide (CO2) 22 22 - 29 mmol/L    Anion Gap 11 7 - 15 mmol/L    Urea Nitrogen 6.9 6.0 - 20.0 mg/dL    Creatinine 0.50 (L) 0.51 - 0.95 mg/dL    GFR  Estimate >90 >60 mL/min/1.73m2    Calcium 8.3 (L) 8.8 - 10.4 mg/dL    Glucose 94 70 - 99 mg/dL   Reticulocyte count    Collection Time: 10/12/24  9:22 PM   Result Value Ref Range    % Reticulocyte 16.6 (H) 0.5 - 2.0 %    Absolute Reticulocyte 0.388 (H) 0.025 - 0.095 10e6/uL   CBC with platelets and differential    Collection Time: 10/12/24  9:22 PM   Result Value Ref Range    WBC Count 19.8 (H) 4.0 - 11.0 10e3/uL    RBC Count 2.34 (L) 3.80 - 5.20 10e6/uL    Hemoglobin 7.1 (L) 11.7 - 15.7 g/dL    Hematocrit 20.6 (L) 35.0 - 47.0 %    MCV 88 78 - 100 fL    MCH 30.3 26.5 - 33.0 pg    MCHC 34.5 31.5 - 36.5 g/dL    RDW 21.2 (H) 10.0 - 15.0 %    Platelet Count 469 (H) 150 - 450 10e3/uL    % Neutrophils 80 %    % Lymphocytes 10 %    % Monocytes 9 %    % Eosinophils 0 %    % Basophils 0 %    % Immature Granulocytes 1 %    NRBCs per 100 WBC 1 (H) <1 /100    Absolute Neutrophils 15.7 (H) 1.6 - 8.3 10e3/uL    Absolute Lymphocytes 1.9 0.8 - 5.3 10e3/uL    Absolute Monocytes 1.8 (H) 0.0 - 1.3 10e3/uL    Absolute Eosinophils 0.1 0.0 - 0.7 10e3/uL    Absolute Basophils 0.1 0.0 - 0.2 10e3/uL    Absolute Immature Granulocytes 0.1 <=0.4 10e3/uL    Absolute NRBCs 0.1 10e3/uL   Manual Differential    Collection Time: 10/12/24  9:22 PM   Result Value Ref Range    % Neutrophils 82 %    % Lymphocytes 14 %    % Monocytes 4 %    % Eosinophils 1 %    % Basophils 0 %    Absolute Neutrophils 16.1 (H) 1.6 - 8.3 10e3/uL    Absolute Lymphocytes 2.7 0.8 - 5.3 10e3/uL    Absolute Monocytes 0.7 0.0 - 1.3 10e3/uL    Absolute Eosinophils 0.2 0.0 - 0.7 10e3/uL    Absolute Basophils 0.0 0.0 - 0.2 10e3/uL    RBC Morphology Confirmed RBC Indices     Platelet Assessment  Automated Count Confirmed. Platelet morphology is normal.     Automated Count Confirmed. Platelet morphology is normal.    Sickle Cells Moderate (A) None Seen    Target Cells Slight (A) None Seen   Reticulocyte count    Collection Time: 10/16/24  2:19 PM   Result Value Ref Range    %  Reticulocyte 9.6 (H) 0.5 - 2.0 %    Absolute Reticulocyte 0.216 (H) 0.025 - 0.095 10e6/uL   Routine UA with micro reflex to culture    Collection Time: 10/16/24  2:19 PM    Specimen: Urine, Clean Catch   Result Value Ref Range    Color Urine Yellow Colorless, Straw, Light Yellow, Yellow    Appearance Urine Clear Clear    Glucose Urine Negative Negative mg/dL    Bilirubin Urine Negative Negative    Ketones Urine Negative Negative mg/dL    Specific Gravity Urine 1.012 1.003 - 1.035    Blood Urine Negative Negative    pH Urine 6.5 5.0 - 7.0    Protein Albumin Urine Negative Negative mg/dL    Urobilinogen Urine 2.0 Normal, 2.0 mg/dL    Nitrite Urine Negative Negative    Leukocyte Esterase Urine Negative Negative    Mucus Urine Present (A) None Seen /LPF    RBC Urine 0 <=2 /HPF    WBC Urine 1 <=5 /HPF    Squamous Epithelials Urine <1 <=1 /HPF   CBC with platelets and differential    Collection Time: 10/16/24  2:19 PM   Result Value Ref Range    WBC Count 13.5 (H) 4.0 - 11.0 10e3/uL    RBC Count 2.25 (L) 3.80 - 5.20 10e6/uL    Hemoglobin 6.6 (LL) 11.7 - 15.7 g/dL    Hematocrit 19.3 (L) 35.0 - 47.0 %    MCV 86 78 - 100 fL    MCH 29.3 26.5 - 33.0 pg    MCHC 34.2 31.5 - 36.5 g/dL    RDW 20.1 (H) 10.0 - 15.0 %    Platelet Count 568 (H) 150 - 450 10e3/uL    % Neutrophils 77 %    % Lymphocytes 13 %    % Monocytes 8 %    % Eosinophils 1 %    % Basophils 1 %    % Immature Granulocytes 0 %    NRBCs per 100 WBC 0 <1 /100    Absolute Neutrophils 10.3 (H) 1.6 - 8.3 10e3/uL    Absolute Lymphocytes 1.8 0.8 - 5.3 10e3/uL    Absolute Monocytes 1.0 0.0 - 1.3 10e3/uL    Absolute Eosinophils 0.2 0.0 - 0.7 10e3/uL    Absolute Basophils 0.1 0.0 - 0.2 10e3/uL    Absolute Immature Granulocytes 0.1 <=0.4 10e3/uL    Absolute NRBCs 0.1 10e3/uL   Basic metabolic panel    Collection Time: 10/17/24  6:42 PM   Result Value Ref Range    Sodium 139 135 - 145 mmol/L    Potassium 3.9 3.4 - 5.3 mmol/L    Chloride 106 98 - 107 mmol/L    Carbon Dioxide (CO2)  21 (L) 22 - 29 mmol/L    Anion Gap 12 7 - 15 mmol/L    Urea Nitrogen 6.2 6.0 - 20.0 mg/dL    Creatinine 0.47 (L) 0.51 - 0.95 mg/dL    GFR Estimate >90 >60 mL/min/1.73m2    Calcium 9.1 8.8 - 10.4 mg/dL    Glucose 85 70 - 99 mg/dL   CBC with platelets and differential    Collection Time: 10/17/24  6:42 PM   Result Value Ref Range    WBC Count 11.4 (H) 4.0 - 11.0 10e3/uL    RBC Count 2.34 (L) 3.80 - 5.20 10e6/uL    Hemoglobin 6.7 (LL) 11.7 - 15.7 g/dL    Hematocrit 20.4 (L) 35.0 - 47.0 %    MCV 87 78 - 100 fL    MCH 28.6 26.5 - 33.0 pg    MCHC 32.8 31.5 - 36.5 g/dL    RDW 20.5 (H) 10.0 - 15.0 %    Platelet Count 549 (H) 150 - 450 10e3/uL    % Neutrophils 69 %    % Lymphocytes 18 %    % Monocytes 8 %    % Eosinophils 2 %    % Basophils 2 %    % Immature Granulocytes 0 %    NRBCs per 100 WBC 0 <1 /100    Absolute Neutrophils 7.9 1.6 - 8.3 10e3/uL    Absolute Lymphocytes 2.0 0.8 - 5.3 10e3/uL    Absolute Monocytes 1.0 0.0 - 1.3 10e3/uL    Absolute Eosinophils 0.3 0.0 - 0.7 10e3/uL    Absolute Basophils 0.2 0.0 - 0.2 10e3/uL    Absolute Immature Granulocytes 0.0 <=0.4 10e3/uL    Absolute NRBCs 0.0 10e3/uL   Symptomatic Influenza A/B, RSV, & SARS-CoV2 PCR (COVID-19) Nasopharyngeal    Collection Time: 10/17/24  9:12 PM    Specimen: Nasopharyngeal; Swab   Result Value Ref Range    Influenza A PCR Negative Negative    Influenza B PCR Negative Negative    RSV PCR Negative Negative    SARS CoV2 PCR Negative Negative   Prepare red blood cells (unit)    Collection Time: 10/20/24  7:27 AM   Result Value Ref Range    Blood Component Type Red Blood Cells     Product Code V1211H57     Unit Status Transfused     Unit Number [id number]     CROSSMATCH Compatible     CODING SYSTEM QCRA440     ISSUE DATE AND TIME [id number]     UNIT ABO/RH O-     UNIT TYPE ISBT 9500    Adult Type and Screen    Collection Time: 10/21/24  1:32 PM   Result Value Ref Range    ABO/RH(D) O POS     Antibody Screen Negative Negative    SPECIMEN EXPIRATION  DATE [id number]    CBC with platelets and differential    Collection Time: 10/21/24  1:32 PM   Result Value Ref Range    WBC Count 9.6 4.0 - 11.0 10e3/uL    RBC Count 2.64 (L) 3.80 - 5.20 10e6/uL    Hemoglobin 7.3 (L) 11.7 - 15.7 g/dL    Hematocrit 22.4 (L) 35.0 - 47.0 %    MCV 85 78 - 100 fL    MCH 27.7 26.5 - 33.0 pg    MCHC 32.6 31.5 - 36.5 g/dL    RDW 22.7 (H) 10.0 - 15.0 %    Platelet Count 753 (H) 150 - 450 10e3/uL    % Neutrophils 67 %    % Lymphocytes 22 %    % Monocytes 5 %    % Eosinophils 4 %    % Basophils 3 %    % Immature Granulocytes 0 %    NRBCs per 100 WBC 1 (H) <1 /100    Absolute Neutrophils 6.4 1.6 - 8.3 10e3/uL    Absolute Lymphocytes 2.1 0.8 - 5.3 10e3/uL    Absolute Monocytes 0.5 0.0 - 1.3 10e3/uL    Absolute Eosinophils 0.3 0.0 - 0.7 10e3/uL    Absolute Basophils 0.3 (H) 0.0 - 0.2 10e3/uL    Absolute Immature Granulocytes 0.0 <=0.4 10e3/uL    Absolute NRBCs 0.1 10e3/uL     I reviewed the above labs today.    Assessment and Plan:  1. Sickle Cell HgbSS Disease  2. Acute pain crises  3. Chronic Pain  4. 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 History of CVA  7. Hearing loss  8. Nausea/vomiting       Jennifer is seen today for routine follow-up. Her pain is pretty good today.    She is due for CT Abdomen/Pelvis and EGD for some intermittent abdominal pain, nausea and vomiting but this has been better as of late.      Regarding her sickle cell management, she continues to make an effort to reduce her oral oxycodone use at home.  The work with RONALDO Mantilla has really paid off, as has Jennifer's own personal drive to improve her regimen. We are going to go down to 10 tabs/week. She ultimately is hoping to get to 5 tabs/week soon. As was done at previous visits, I encouraged her to try to extend the length of her prescriptions for greater than 1 week to extend the time windows.      I encouraged her to continue to attempt to decrease her ED visits as  able.  For now we are continuing to limit her infusion visits at 2 times per week.  In the future we we will attempt to decrease this further although need to maintain a stepwise approach to reducing her opioid use.    There is some confusion over her iron chelation.  She remains on Jadenu and is taking this regularly.  Deferiprone was added to her regimen earlier this year although she states she has not received this medication for a few months now.  She is due for repeat for a scan next month which is ordered for November. I added Deferiprone back as well.      -------------------------------------  Plan:  -Continue Hydrea to 3000mg daily to help lessen frequency of sickle cell pain.   -Continue monthly crizanlizumab infusions  -Continue slow taper of oxycodone. Currently receiving oxycodone 10 mg every 4 hours PRN, qty 10.    -She can self-reduce infusion days/week and we will continue to keep the cap at 2/week for now.   -Continue infusion center visits limited to two times per week (Mondays and Fridays ideally although still needs to call to request). Continue diligent home management with current medications, heat, rest, compression, warm baths.   -If unable to manage at home can go to ED  with continued plan to use IV Dilaudid and take a break from ketamine (10/2/23). No PCA use and goal for any admission would still be to discharge by 5 days or less  -EGD for evaluation of ongoing n/v and abdominal pain, scheduled in November.  CT abdomen/pelvis also due as ordered by Dr. Case.  Encourage completion of stool studies for calprotectin and H. pylori.  -Continue metoclopramide 5 mg TID PRN  - Continue Jadenu for iron overload.  Adding Deferiprone but also due for Ferriscan for iron overload monitoring.  -Continue aspirin BID  -Due at some point for Meningitis B booster.  -RTC with Patricia Mantilla on 10/31        Eric Duncan MD  Classical Hematologist  Division of Hematology, Oncology, and  Transplantation  [facility]  [facility]  Pager: [phone]      ---  45 minutes spent on the date of the encounter doing chart review, review of test results, interpretation of tests, patient visit, and documentation.    The longitudinal plan of care for the diagnosis(es)/condition(s) as documented were addressed during this visit. Due to the added complexity in care, I will continue to support Jennifer in the subsequent management and with ongoing continuity of care.                Again, thank you for allowing me to participate in the care of your patient.        Sincerely,        Eric Duncan MD

## 2024-10-21 NOTE — PROGRESS NOTES
Infusion Nursing Note:  Jennifer Cervantes presents today for scheduled infusion.    Patient seen by provider today: Yes: Dr Duncan   present during visit today: Not Applicable.    Note: Patient received 1 L LR bolus, dilaudid x3, toradol, benadryl and zofran for treatment plan. Pain improved prior to discharge; stable upon discharge.      Intravenous Access:  Implanted Port.    Treatment Conditions:  Results reviewed, labs MET treatment parameters, ok to proceed with treatment.      Post Infusion Assessment:  Patient tolerated infusion without incident.       Discharge Plan:   Patient and/or family verbalized understanding of discharge instructions and all questions answered.      Vicenta Boone RN

## 2024-10-21 NOTE — TELEPHONE ENCOUNTER
Per BMT charge nurse they can give PM at 2pm with her transfusion if she does or does not need transfusion.     Call placed to Jennifer that they will give her pain meds at 2pm after appt with DR Duncan.

## 2024-10-21 NOTE — TELEPHONE ENCOUNTER
Narcotic Refill Request    Medication(s) requested:  Oxycodone   Person Requesting Refill:Jennifer   What pain is the medication treating:  Sickle cell pain   How is the medication being taken?:every 6 hours 4 max per day   Does pt have enough for today? All out of meds   Is pain being adequately controlled on the current regimen?: yes   Experiencing any side effects from medication?: No     Date of most recent appointment:  10/16/24 Jalyn Antony   Any No Show Visits: no   Next appointment:   10/21/24 Dr Duncan   Last fill date and by whom:  10/14/24 Patricia Mantilla    Reviewed:  No access     Send to provider:  Dr Duncan and Arely pulliam

## 2024-10-22 ENCOUNTER — INFUSION THERAPY VISIT (OUTPATIENT)
Dept: TRANSPLANT | Facility: CLINIC | Age: 24
End: 2024-10-22
Attending: PEDIATRICS
Payer: COMMERCIAL

## 2024-10-22 VITALS
TEMPERATURE: 98.3 F | HEART RATE: 71 BPM | DIASTOLIC BLOOD PRESSURE: 65 MMHG | RESPIRATION RATE: 16 BRPM | SYSTOLIC BLOOD PRESSURE: 104 MMHG | OXYGEN SATURATION: 98 %

## 2024-10-22 DIAGNOSIS — G81.10 SPASTIC HEMIPLEGIA, UNSPECIFIED ETIOLOGY, UNSPECIFIED LATERALITY (H): ICD-10-CM

## 2024-10-22 DIAGNOSIS — D57.1 HB-SS DISEASE WITHOUT CRISIS (H): Primary | ICD-10-CM

## 2024-10-22 DIAGNOSIS — D57.00 SICKLE CELL PAIN CRISIS (H): ICD-10-CM

## 2024-10-22 DIAGNOSIS — Z86.73 HISTORY OF STROKE: ICD-10-CM

## 2024-10-22 PROCEDURE — 250N000013 HC RX MED GY IP 250 OP 250 PS 637: Performed by: PEDIATRICS

## 2024-10-22 PROCEDURE — P9016 RBC LEUKOCYTES REDUCED: HCPCS | Performed by: PEDIATRICS

## 2024-10-22 PROCEDURE — 258N000003 HC RX IP 258 OP 636: Performed by: PEDIATRICS

## 2024-10-22 PROCEDURE — 250N000011 HC RX IP 250 OP 636: Performed by: PEDIATRICS

## 2024-10-22 PROCEDURE — 36430 TRANSFUSION BLD/BLD COMPNT: CPT

## 2024-10-22 PROCEDURE — 96374 THER/PROPH/DIAG INJ IV PUSH: CPT

## 2024-10-22 PROCEDURE — 96375 TX/PRO/DX INJ NEW DRUG ADDON: CPT

## 2024-10-22 PROCEDURE — 96361 HYDRATE IV INFUSION ADD-ON: CPT

## 2024-10-22 PROCEDURE — 96376 TX/PRO/DX INJ SAME DRUG ADON: CPT

## 2024-10-22 RX ORDER — DIPHENHYDRAMINE HCL 25 MG
50 CAPSULE ORAL
Status: CANCELLED
Start: 2025-01-01

## 2024-10-22 RX ORDER — KETOROLAC TROMETHAMINE 30 MG/ML
30 INJECTION, SOLUTION INTRAMUSCULAR; INTRAVENOUS ONCE
Status: CANCELLED
Start: 2025-01-01 | End: 2025-01-01

## 2024-10-22 RX ORDER — HEPARIN SODIUM,PORCINE 10 UNIT/ML
5 VIAL (ML) INTRAVENOUS
Status: CANCELLED | OUTPATIENT
Start: 2025-01-01

## 2024-10-22 RX ORDER — ONDANSETRON 2 MG/ML
8 INJECTION INTRAMUSCULAR; INTRAVENOUS EVERY 6 HOURS PRN
Status: CANCELLED
Start: 2025-01-01

## 2024-10-22 RX ORDER — ONDANSETRON 2 MG/ML
8 INJECTION INTRAMUSCULAR; INTRAVENOUS EVERY 6 HOURS PRN
Status: DISCONTINUED | OUTPATIENT
Start: 2024-10-22 | End: 2024-10-22 | Stop reason: HOSPADM

## 2024-10-22 RX ORDER — KETOROLAC TROMETHAMINE 30 MG/ML
30 INJECTION, SOLUTION INTRAMUSCULAR; INTRAVENOUS ONCE
Status: COMPLETED | OUTPATIENT
Start: 2024-10-22 | End: 2024-10-22

## 2024-10-22 RX ORDER — ONDANSETRON 4 MG/1
8 TABLET, FILM COATED ORAL
Status: CANCELLED
Start: 2025-01-01

## 2024-10-22 RX ORDER — HEPARIN SODIUM (PORCINE) LOCK FLUSH IV SOLN 100 UNIT/ML 100 UNIT/ML
5 SOLUTION INTRAVENOUS
Status: CANCELLED | OUTPATIENT
Start: 2025-01-01

## 2024-10-22 RX ORDER — DIPHENHYDRAMINE HCL 25 MG
50 CAPSULE ORAL
Status: COMPLETED | OUTPATIENT
Start: 2024-10-22 | End: 2024-10-22

## 2024-10-22 RX ADMIN — HYDROMORPHONE HYDROCHLORIDE 1 MG: 1 INJECTION, SOLUTION INTRAMUSCULAR; INTRAVENOUS; SUBCUTANEOUS at 15:14

## 2024-10-22 RX ADMIN — HYDROMORPHONE HYDROCHLORIDE 1 MG: 1 INJECTION, SOLUTION INTRAMUSCULAR; INTRAVENOUS; SUBCUTANEOUS at 16:20

## 2024-10-22 RX ADMIN — KETOROLAC TROMETHAMINE 30 MG: 30 INJECTION, SOLUTION INTRAMUSCULAR; INTRAVENOUS at 14:14

## 2024-10-22 RX ADMIN — HYDROMORPHONE HYDROCHLORIDE 1 MG: 1 INJECTION, SOLUTION INTRAMUSCULAR; INTRAVENOUS; SUBCUTANEOUS at 14:14

## 2024-10-22 RX ADMIN — DIPHENHYDRAMINE HYDROCHLORIDE 50 MG: 25 CAPSULE ORAL at 14:15

## 2024-10-22 RX ADMIN — ONDANSETRON 8 MG: 2 INJECTION INTRAMUSCULAR; INTRAVENOUS at 14:15

## 2024-10-22 RX ADMIN — SODIUM CHLORIDE, POTASSIUM CHLORIDE, SODIUM LACTATE AND CALCIUM CHLORIDE 1000 ML: 600; 310; 30; 20 INJECTION, SOLUTION INTRAVENOUS at 14:58

## 2024-10-22 ASSESSMENT — PAIN SCALES - GENERAL: PAINLEVEL: SEVERE PAIN (7)

## 2024-10-22 NOTE — PROGRESS NOTES
Infusion Nursing Note:  Jennifer Cervantes presents today for add-on infusion.   Patient seen by provider today: No   present during visit today: Not Applicable.    Note: Patient received RBC per blood plan. Patient also received dilaudid x3, toradol, zofran, benadryl, and 1 L LR bolus per therapy plan. Patient's pain improved prior to discharge; stable prior to discharge.      Intravenous Access:  Implanted Port.    Treatment Conditions:  Results reviewed, labs MET treatment parameters, ok to proceed with treatment.      Post Infusion Assessment:  Patient tolerated infusion without incident.       Discharge Plan:   Patient and/or family verbalized understanding of discharge instructions and all questions answered.      Vicenta Boone RN

## 2024-10-22 NOTE — NURSING NOTE
"Oncology Rooming Note    October 22, 2024 1:07 PM   Jennifer Cervantes is a 24 year old female who presents for:    No chief complaint on file.    Initial Vitals: There were no vitals taken for this visit. Estimated body mass index is 26.37 kg/m  as calculated from the following:    Height as of 10/12/24: 1.626 m (5' 4\").    Weight as of 10/21/24: 69.7 kg (153 lb 9.6 oz). There is no height or weight on file to calculate BSA.  Data Unavailable Comment: Data Unavailable   No LMP recorded. Patient has had an implant.  Allergies reviewed: Yes  Medications reviewed: Yes    Medications: Medication refills not needed today.  Pharmacy name entered into [facility]:    [facility] - [place], [place] - [phone]  [facility] - [place], [place] - [address] [place] [address]    Frailty Screening:   Is the patient here for a new oncology consult visit in cancer care? 2. No      Clinical concerns: N/A      Vicenta Boone RN              "

## 2024-10-23 ENCOUNTER — NURSE TRIAGE (OUTPATIENT)
Dept: ONCOLOGY | Facility: CLINIC | Age: 24
End: 2024-10-23
Payer: COMMERCIAL

## 2024-10-23 ENCOUNTER — HOSPITAL ENCOUNTER (EMERGENCY)
Facility: CLINIC | Age: 24
Discharge: HOME | End: 2024-10-23
Attending: FAMILY MEDICINE | Admitting: FAMILY MEDICINE
Payer: COMMERCIAL

## 2024-10-23 VITALS
HEIGHT: 64 IN | RESPIRATION RATE: 16 BRPM | BODY MASS INDEX: 26.63 KG/M2 | TEMPERATURE: 98.6 F | WEIGHT: 156 LBS | OXYGEN SATURATION: 94 % | SYSTOLIC BLOOD PRESSURE: 103 MMHG | HEART RATE: 75 BPM | DIASTOLIC BLOOD PRESSURE: 65 MMHG

## 2024-10-23 DIAGNOSIS — D57.00 SICKLE CELL PAIN CRISIS (H): ICD-10-CM

## 2024-10-23 LAB
ALBUMIN SERPL BCG-MCNC: 4.5 G/DL (ref 3.5–5.2)
ALP SERPL-CCNC: 63 U/L (ref 40–150)
ALT SERPL W P-5'-P-CCNC: 19 U/L (ref 0–50)
ANION GAP SERPL CALCULATED.3IONS-SCNC: 8 MMOL/L (ref 7–15)
AST SERPL W P-5'-P-CCNC: 29 U/L (ref 0–45)
BASOPHILS # BLD AUTO: 0.1 10E3/UL (ref 0–0.2)
BASOPHILS NFR BLD AUTO: 1 %
BILIRUB SERPL-MCNC: 1.2 MG/DL
BUN SERPL-MCNC: 6.8 MG/DL (ref 6–20)
CALCIUM SERPL-MCNC: 9.3 MG/DL (ref 8.8–10.4)
CHLORIDE SERPL-SCNC: 103 MMOL/L (ref 98–107)
CREAT SERPL-MCNC: 0.52 MG/DL (ref 0.51–0.95)
EGFRCR SERPLBLD CKD-EPI 2021: >90 ML/MIN/1.73M2
EOSINOPHIL # BLD AUTO: 0 10E3/UL (ref 0–0.7)
EOSINOPHIL NFR BLD AUTO: 0 %
ERYTHROCYTE [DISTWIDTH] IN BLOOD BY AUTOMATED COUNT: 21.6 % (ref 10–15)
FRAGMENTS BLD QL SMEAR: SLIGHT
GLUCOSE SERPL-MCNC: 103 MG/DL (ref 70–99)
HCG SERPL QL: NEGATIVE
HCO3 SERPL-SCNC: 25 MMOL/L (ref 22–29)
HCT VFR BLD AUTO: 25.7 % (ref 35–47)
HGB BLD-MCNC: 8.6 G/DL (ref 11.7–15.7)
IMM GRANULOCYTES # BLD: 0 10E3/UL
IMM GRANULOCYTES NFR BLD: 0 %
LACTATE SERPL-SCNC: 1.1 MMOL/L (ref 0.7–2)
LIPASE SERPL-CCNC: 24 U/L (ref 13–60)
LYMPHOCYTES # BLD AUTO: 0.8 10E3/UL (ref 0.8–5.3)
LYMPHOCYTES NFR BLD AUTO: 8 %
MCH RBC QN AUTO: 28.4 PG (ref 26.5–33)
MCHC RBC AUTO-ENTMCNC: 33.5 G/DL (ref 31.5–36.5)
MCV RBC AUTO: 85 FL (ref 78–100)
MONOCYTES # BLD AUTO: 0.6 10E3/UL (ref 0–1.3)
MONOCYTES NFR BLD AUTO: 5 %
NEUTROPHILS # BLD AUTO: 8.8 10E3/UL (ref 1.6–8.3)
NEUTROPHILS NFR BLD AUTO: 86 %
NRBC # BLD AUTO: 0.1 10E3/UL
NRBC BLD AUTO-RTO: 1 /100
PLAT MORPH BLD: ABNORMAL
PLATELET # BLD AUTO: 736 10E3/UL (ref 150–450)
POLYCHROMASIA BLD QL SMEAR: SLIGHT
POTASSIUM SERPL-SCNC: 4.2 MMOL/L (ref 3.4–5.3)
PROT SERPL-MCNC: 8.1 G/DL (ref 6.4–8.3)
RBC # BLD AUTO: 3.03 10E6/UL (ref 3.8–5.2)
RBC MORPH BLD: ABNORMAL
RETICS # AUTO: 0.32 10E6/UL (ref 0.03–0.1)
RETICS/RBC NFR AUTO: 10.8 % (ref 0.5–2)
SICKLE CELLS BLD QL SMEAR: SLIGHT
SODIUM SERPL-SCNC: 136 MMOL/L (ref 135–145)
TARGETS BLD QL SMEAR: SLIGHT
WBC # BLD AUTO: 10.2 10E3/UL (ref 4–11)

## 2024-10-23 PROCEDURE — 96375 TX/PRO/DX INJ NEW DRUG ADDON: CPT | Performed by: FAMILY MEDICINE

## 2024-10-23 PROCEDURE — 96374 THER/PROPH/DIAG INJ IV PUSH: CPT | Performed by: FAMILY MEDICINE

## 2024-10-23 PROCEDURE — 85045 AUTOMATED RETICULOCYTE COUNT: CPT | Performed by: FAMILY MEDICINE

## 2024-10-23 PROCEDURE — 85025 COMPLETE CBC W/AUTO DIFF WBC: CPT | Performed by: FAMILY MEDICINE

## 2024-10-23 PROCEDURE — 99284 EMERGENCY DEPT VISIT MOD MDM: CPT | Performed by: FAMILY MEDICINE

## 2024-10-23 PROCEDURE — 99284 EMERGENCY DEPT VISIT MOD MDM: CPT | Mod: 25 | Performed by: FAMILY MEDICINE

## 2024-10-23 PROCEDURE — 258N000003 HC RX IP 258 OP 636: Performed by: FAMILY MEDICINE

## 2024-10-23 PROCEDURE — 96376 TX/PRO/DX INJ SAME DRUG ADON: CPT | Performed by: FAMILY MEDICINE

## 2024-10-23 PROCEDURE — 250N000011 HC RX IP 250 OP 636: Performed by: FAMILY MEDICINE

## 2024-10-23 PROCEDURE — 80053 COMPREHEN METABOLIC PANEL: CPT | Performed by: FAMILY MEDICINE

## 2024-10-23 PROCEDURE — 84703 CHORIONIC GONADOTROPIN ASSAY: CPT | Performed by: FAMILY MEDICINE

## 2024-10-23 PROCEDURE — 36415 COLL VENOUS BLD VENIPUNCTURE: CPT | Performed by: FAMILY MEDICINE

## 2024-10-23 PROCEDURE — 96361 HYDRATE IV INFUSION ADD-ON: CPT | Performed by: FAMILY MEDICINE

## 2024-10-23 PROCEDURE — 83605 ASSAY OF LACTIC ACID: CPT | Performed by: FAMILY MEDICINE

## 2024-10-23 PROCEDURE — 83690 ASSAY OF LIPASE: CPT | Performed by: FAMILY MEDICINE

## 2024-10-23 RX ORDER — KETOROLAC TROMETHAMINE 30 MG/ML
30 INJECTION, SOLUTION INTRAMUSCULAR; INTRAVENOUS ONCE
Status: COMPLETED | OUTPATIENT
Start: 2024-10-23 | End: 2024-10-23

## 2024-10-23 RX ORDER — HEPARIN SODIUM (PORCINE) LOCK FLUSH IV SOLN 100 UNIT/ML 100 UNIT/ML
100 SOLUTION INTRAVENOUS ONCE
Status: DISCONTINUED | OUTPATIENT
Start: 2024-10-23 | End: 2024-10-23

## 2024-10-23 RX ORDER — SODIUM CHLORIDE, SODIUM LACTATE, POTASSIUM CHLORIDE, CALCIUM CHLORIDE 600; 310; 30; 20 MG/100ML; MG/100ML; MG/100ML; MG/100ML
INJECTION, SOLUTION INTRAVENOUS CONTINUOUS
Status: DISCONTINUED | OUTPATIENT
Start: 2024-10-23 | End: 2024-10-23 | Stop reason: HOSPADM

## 2024-10-23 RX ORDER — ONDANSETRON 2 MG/ML
4 INJECTION INTRAMUSCULAR; INTRAVENOUS EVERY 30 MIN PRN
Status: DISCONTINUED | OUTPATIENT
Start: 2024-10-23 | End: 2024-10-23 | Stop reason: HOSPADM

## 2024-10-23 RX ORDER — HEPARIN SODIUM (PORCINE) LOCK FLUSH IV SOLN 100 UNIT/ML 100 UNIT/ML
5-10 SOLUTION INTRAVENOUS
Status: DISCONTINUED | OUTPATIENT
Start: 2024-10-23 | End: 2024-10-23 | Stop reason: HOSPADM

## 2024-10-23 RX ADMIN — SODIUM CHLORIDE, POTASSIUM CHLORIDE, SODIUM LACTATE AND CALCIUM CHLORIDE 1000 ML: 600; 310; 30; 20 INJECTION, SOLUTION INTRAVENOUS at 11:57

## 2024-10-23 RX ADMIN — HEPARIN 5 ML: 100 SYRINGE at 16:34

## 2024-10-23 RX ADMIN — ONDANSETRON 4 MG: 2 INJECTION INTRAMUSCULAR; INTRAVENOUS at 11:56

## 2024-10-23 RX ADMIN — HYDROMORPHONE HYDROCHLORIDE 1 MG: 1 INJECTION, SOLUTION INTRAMUSCULAR; INTRAVENOUS; SUBCUTANEOUS at 11:57

## 2024-10-23 RX ADMIN — HYDROMORPHONE HYDROCHLORIDE 1 MG: 1 INJECTION, SOLUTION INTRAMUSCULAR; INTRAVENOUS; SUBCUTANEOUS at 13:40

## 2024-10-23 RX ADMIN — KETOROLAC TROMETHAMINE 30 MG: 30 INJECTION, SOLUTION INTRAMUSCULAR at 11:57

## 2024-10-23 RX ADMIN — HYDROMORPHONE HYDROCHLORIDE 1 MG: 1 INJECTION, SOLUTION INTRAMUSCULAR; INTRAVENOUS; SUBCUTANEOUS at 14:36

## 2024-10-23 ASSESSMENT — ACTIVITIES OF DAILY LIVING (ADL)
ADLS_ACUITY_SCORE: 0

## 2024-10-23 NOTE — ED PROVIDER NOTES
[facility] [place] EMERGENCY DEPARTMENT ([facility])    10/23/24      ED PROVIDER NOTE   History     Chief Complaint   Patient presents with    Sickle Cell Pain Crisis     Patient reports getting a blood transfusion yesterday and now having a sickle cell pain crisis with most of the pain being in her abdomen      The history is provided by the patient and medical records.     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 Patient reports she had scheduled blood transfusion yesterday and since then has been experiencing generalized sharp body aches and pain. She notes a history of these symptoms after receiving blood transfusions. She states home pain medications are not providing pain relief. She has had normal bowel movements. Last bowel movement was this morning. Denies fever, chills, vomiting, or urinary symptoms. She notes neck swelling and tightness related to reactive lymph node has improved.     Past Medical History  Past Medical History:   Diagnosis Date    Anxiety     Bleeding disorder (H)     Blood clotting disorder (H)     Cerebral infarction (H) 2015    Cognitive developmental delay     low IQ. 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 side (H)     right hand contractures    Iron overload due to repeated red blood cell transfusions     Migraines     Multiple subsegmental pulmonary emboli without acute cor pulmonale (H) 02/01/2021    Oppositional defiant behavior     Presence of intrauterine contraceptive device 2/18/2020     Superficial venous thrombosis of arm, right 03/25/2021    Uncomplicated asthma      Past Surgical History:   Procedure Laterality Date    AS INSERT TUNNELED CV 2 CATH W/O PORT/PUMP      CHOLECYSTECTOMY      CV RIGHT HEART CATH MEASUREMENTS RECORDED N/A 11/18/2021    Procedure: Right Heart Cath;  Surgeon: Jackson Stauffer MD;  Location:  HEART CARDIAC CATH LAB    INSERT PORT VASCULAR ACCESS Left 4/21/2021    Procedure: INSERTION, VASCULAR ACCESS PORT (NOT SURE ON SIDE UNTIL REMOVAL);  Surgeon: Rajan More MD;  Location: [facility] OR    IR CHEST PORT PLACEMENT > 5 YRS OF AGE  4/21/2021    IR CVC NON TUNNEL LINE REMOVAL  5/6/2021    IR CVC NON TUNNEL PLACEMENT > 5 YRS  04/07/2020    IR CVC NON TUNNEL PLACEMENT > 5 YRS  4/30/2021    IR CVC NON TUNNEL PLACEMENT > 5 YRS  9/7/2022    IR PORT REMOVAL LEFT  4/21/2021    REMOVE PORT VASCULAR ACCESS Left 4/21/2021    Procedure: REMOVAL, VASCULAR ACCESS PORT LEFT;  Surgeon: Rajan More MD;  Location: [facility] OR    REPAIR TENDON ELBOW Right 10/02/2019    Procedure: Right Elbow Flexor Lengthening, Flexor Pronator Slide Of Wrist and Finger, Thumb Adductor Lengthening;  Surgeon: Anai Franco MD;  Location: [facility] OR    TONSILLECTOMY Bilateral 10/02/2019    Procedure: Bilateral Tonsillectomy;  Surgeon: Farhana Guy MD;  Location: [facility] OR    ZZC BREAST REDUCTION (INCLUDES LIPO) TIER 3 Bilateral 04/23/2019     albuterol (PROVENTIL) (2.5 MG/3ML) 0.083% neb solution  aspirin (ASA) 81 MG chewable tablet  budesonide-formoterol (SYMBICORT) 160-4.5 MCG/ACT Inhaler  deferasirox (JADENU) 360 MG tablet  Deferiprone, Twice Daily, 1000 MG TABS  diphenhydrAMINE (BENADRYL) 50 MG capsule  EPINEPHrine (ANY BX GENERIC EQUIV) 0.3 MG/0.3ML injection 2-pack  gabapentin (NEURONTIN) 300 MG capsule  hydroxyurea (HYDREA) 500 MG capsule  ibuprofen (ADVIL/MOTRIN) 800 MG tablet  melatonin 5 MG tablet  methocarbamol (ROBAXIN) 750 MG tablet  methylPREDNISolone (MEDROL) 32 MG tablet  naloxone  "(NARCAN) 4 MG/0.1ML nasal spray  ondansetron (ZOFRAN ODT) 8 MG ODT tab  oxyCODONE IR (ROXICODONE) 10 MG tablet      Allergies   Allergen Reactions    Contrast Dye Angioedema     Hives and breathing issues    Fish-Derived Products Hives    Seafood Hives    Adhesive Tape Hives     Primipore dressing causes hives    Gadolinium     Iodinated Contrast Media      Family History  Family History   Problem Relation Age of Onset    Sickle Cell Trait Mother     Hypertension Mother     Asthma Mother     Sickle Cell Trait Father     Glaucoma No family hx of     Macular Degeneration No family hx of     Diabetes No family hx of     Gout No family hx of      Social History   Social History     Tobacco Use    Smoking status: Never     Passive exposure: Never    Smokeless tobacco: Never   Substance Use Topics    Alcohol use: Not Currently     Alcohol/week: 0.0 standard drinks of alcohol    Drug use: Never      A complete review of systems was performed with pertinent positives and negatives noted in the HPI, and all other systems negative.    Physical Exam   BP: 106/66  Pulse: 80  Temp: 98.6  F (37  C)  Resp: 16  Height: 162.6 cm (5' 4\")  Weight: 70.8 kg (156 lb)  SpO2: 100 %  Physical Exam  Vitals and nursing note reviewed.   Constitutional:       General: She is not in acute distress.     Appearance: Normal appearance. She is not diaphoretic.   HENT:      Head: Atraumatic.      Mouth/Throat:      Mouth: Mucous membranes are moist.   Eyes:      General: No scleral icterus.     Conjunctiva/sclera: Conjunctivae normal.   Cardiovascular:      Rate and Rhythm: Normal rate.      Heart sounds: Normal heart sounds.   Pulmonary:      Effort: No respiratory distress.      Breath sounds: Normal breath sounds.   Abdominal:      General: Abdomen is flat. Bowel sounds are normal.      Palpations: Abdomen is soft.      Tenderness: There is generalized abdominal tenderness. There is no guarding or rebound.   Musculoskeletal:      Cervical back: " Neck supple.      Comments: Patient has generalized tenderness of her shoulders, arms and legs, but no warmth erythema swelling or joint effusions   Skin:     General: Skin is warm.      Findings: No rash.   Neurological:      Mental Status: She is alert.           ED Course, Procedures, & Data      Procedures                 Results for orders placed or performed during the hospital encounter of 10/23/24   Comprehensive metabolic panel     Status: Abnormal   Result Value Ref Range    Sodium 136 135 - 145 mmol/L    Potassium 4.2 3.4 - 5.3 mmol/L    Carbon Dioxide (CO2) 25 22 - 29 mmol/L    Anion Gap 8 7 - 15 mmol/L    Urea Nitrogen 6.8 6.0 - 20.0 mg/dL    Creatinine 0.52 0.51 - 0.95 mg/dL    GFR Estimate >90 >60 mL/min/1.73m2    Calcium 9.3 8.8 - 10.4 mg/dL    Chloride 103 98 - 107 mmol/L    Glucose 103 (H) 70 - 99 mg/dL    Alkaline Phosphatase 63 40 - 150 U/L    AST 29 0 - 45 U/L    ALT 19 0 - 50 U/L    Protein Total 8.1 6.4 - 8.3 g/dL    Albumin 4.5 3.5 - 5.2 g/dL    Bilirubin Total 1.2 <=1.2 mg/dL   Reticulocyte count     Status: Abnormal   Result Value Ref Range    % Reticulocyte 10.8 (H) 0.5 - 2.0 %    Absolute Reticulocyte 0.315 (H) 0.025 - 0.095 10e6/uL   Lipase     Status: Normal   Result Value Ref Range    Lipase 24 13 - 60 U/L   Lactic acid whole blood with 1x repeat in 2 hr when >2     Status: Normal   Result Value Ref Range    Lactic Acid, Initial 1.1 0.7 - 2.0 mmol/L   HCG qualitative Blood     Status: Normal   Result Value Ref Range    hCG Serum Qualitative Negative Negative   CBC with platelets and differential     Status: Abnormal   Result Value Ref Range    WBC Count 10.2 4.0 - 11.0 10e3/uL    RBC Count 3.03 (L) 3.80 - 5.20 10e6/uL    Hemoglobin 8.6 (L) 11.7 - 15.7 g/dL    Hematocrit 25.7 (L) 35.0 - 47.0 %    MCV 85 78 - 100 fL    MCH 28.4 26.5 - 33.0 pg    MCHC 33.5 31.5 - 36.5 g/dL    RDW 21.6 (H) 10.0 - 15.0 %    Platelet Count 736 (H) 150 - 450 10e3/uL    % Neutrophils 86 %    % Lymphocytes 8 %     % Monocytes 5 %    % Eosinophils 0 %    % Basophils 1 %    % Immature Granulocytes 0 %    NRBCs per 100 WBC 1 (H) <1 /100    Absolute Neutrophils 8.8 (H) 1.6 - 8.3 10e3/uL    Absolute Lymphocytes 0.8 0.8 - 5.3 10e3/uL    Absolute Monocytes 0.6 0.0 - 1.3 10e3/uL    Absolute Eosinophils 0.0 0.0 - 0.7 10e3/uL    Absolute Basophils 0.1 0.0 - 0.2 10e3/uL    Absolute Immature Granulocytes 0.0 <=0.4 10e3/uL    Absolute NRBCs 0.1 10e3/uL   RBC and Platelet Morphology     Status: Abnormal   Result Value Ref Range    RBC Morphology Confirmed RBC Indices     Platelet Assessment  Automated Count Confirmed. Platelet morphology is normal.     Automated Count Confirmed. Platelet morphology is normal.    Polychromasia Slight (A) None Seen    RBC Fragments Slight (A) None Seen    Sickle Cells Slight (A) None Seen    Target Cells Slight (A) None Seen   CBC with platelets differential     Status: Abnormal    Narrative    The following orders were created for panel order CBC with platelets differential.  Procedure                               Abnormality         Status                     ---------                               -----------         ------                     CBC with platelets and d...[[id number]]  Abnormal            Final result               RBC and Platelet Morphology[[id number]]  Abnormal            Final result                 Please view results for these tests on the individual orders.     Medications   ondansetron (ZOFRAN) injection 4 mg (4 mg Intravenous $Given 10/23/24 1156)   lactated ringers infusion (has no administration in time range)   HYDROmorphone (DILAUDID) injection 1 mg (1 mg Intravenous $Given 10/23/24 1157)   ketorolac (TORADOL) injection 30 mg (30 mg Intravenous $Given 10/23/24 1157)   lactated ringers BOLUS 1,000 mL (1,000 mLs Intravenous $New Bag 10/23/24 1157)   HYDROmorphone (DILAUDID) injection 1 mg (1 mg Intravenous $Given 10/23/24 1340)     Labs Ordered and Resulted from Time of ED  Arrival to Time of ED Departure   COMPREHENSIVE METABOLIC PANEL - Abnormal       Result Value    Sodium 136      Potassium 4.2      Carbon Dioxide (CO2) 25      Anion Gap 8      Urea Nitrogen 6.8      Creatinine 0.52      GFR Estimate >90      Calcium 9.3      Chloride 103      Glucose 103 (*)     Alkaline Phosphatase 63      AST 29      ALT 19      Protein Total 8.1      Albumin 4.5      Bilirubin Total 1.2     RETICULOCYTE COUNT - Abnormal    % Reticulocyte 10.8 (*)     Absolute Reticulocyte 0.315 (*)    CBC WITH PLATELETS AND DIFFERENTIAL - Abnormal    WBC Count 10.2      RBC Count 3.03 (*)     Hemoglobin 8.6 (*)     Hematocrit 25.7 (*)     MCV 85      MCH 28.4      MCHC 33.5      RDW 21.6 (*)     Platelet Count 736 (*)     % Neutrophils 86      % Lymphocytes 8      % Monocytes 5      % Eosinophils 0      % Basophils 1      % Immature Granulocytes 0      NRBCs per 100 WBC 1 (*)     Absolute Neutrophils 8.8 (*)     Absolute Lymphocytes 0.8      Absolute Monocytes 0.6      Absolute Eosinophils 0.0      Absolute Basophils 0.1      Absolute Immature Granulocytes 0.0      Absolute NRBCs 0.1     RBC AND PLATELET MORPHOLOGY - Abnormal    RBC Morphology Confirmed RBC Indices      Platelet Assessment        Value: Automated Count Confirmed. Platelet morphology is normal.    Polychromasia Slight (*)     RBC Fragments Slight (*)     Sickle Cells Slight (*)     Target Cells Slight (*)    LIPASE - Normal    Lipase 24     LACTIC ACID WHOLE BLOOD WITH 1X REPEAT IN 2 HR WHEN >2 - Normal    Lactic Acid, Initial 1.1     HCG QUALITATIVE PREGNANCY - Normal    hCG Serum Qualitative Negative     ROUTINE UA WITH MICROSCOPIC REFLEX TO CULTURE     No orders to display          Critical care was not performed.     Medical Decision Making  The patient's presentation was of moderate complexity (a chronic illness mild to moderate exacerbation, progression, or side effect of treatment).    The patient's evaluation involved:  review of  external note(s) from 3+ sources (reviewed recent hematology notes and most recent ED visits in [facility])  review of 3+ test result(s) ordered prior to this encounter (see separate area of note for details)  ordering and/or review of 3+ test(s) in this encounter (see separate area of note for details)    The patient's management necessitated moderate risk (prescription drug management including medications given in the ED), high risk (a parenteral controlled substance), high risk (a decision regarding hospitalization), and further care after sign-out to Dr. Young (see their note for further management).    Assessment & Plan    Patient with a history of sickle cell disease and occasional sickle cell pain crisis who presents reporting generalized body pain and generalized abdominal pain following a transfusion therapy yesterday.  She tells me that she frequently has similar episodes following these transfusions.  On exam today her vital signs are normal and she does not appear in acute distress.  She has generalized abdominal tenderness without peritoneal signs on physical exam.  Her extremity exam no synovitis effusion, cellulitis, abnormal swelling only generalized muscle tenderness.  Physical exam otherwise unremarkable.  She has a care plan in place in epic and treatment was initiated consistent with the care plan and labs obtained.  Labs appear to be at or near baseline.  Her hemoglobin is actually improved consistent with her recent transfusion.  She is responding to the medications provided through her care plan and can likely be discharged.  Signing out to Dr. Young at shift change to assess prior to discharge and be certain her symptoms are adequately controlled.  I have reviewed the nursing notes. I have reviewed the findings, diagnosis, plan and need for follow up with the patient.    New Prescriptions    No medications on file       Final diagnoses:   Sickle cell pain crisis (H)   PATY, Елена Rasmussen  Josefa, am serving as a trained medical scribe to document services personally performed by Ifeanyi Valdez MD, based on the provider's statements to me.     I, Ifeanyi Valdez MD, was physically present and have reviewed and verified the accuracy of this note documented by Елена Rivero.     Ifeanyi Valdez MD  [facility] EMERGENCY DEPARTMENT  10/23/2024     Ifeanyi Valdez MD  10/23/24 [id number]

## 2024-10-23 NOTE — TELEPHONE ENCOUNTER
[facility] wants to confirm pt should be taking deferiprone along with the deferasirox.    [facility] line [phone].    Secure message to Jalyn Antony to confirm.    Patricia Mantilla stating yes, pt should be taking both.     1223 Call to pharmacy and informed them of provider response.

## 2024-10-23 NOTE — PROGRESS NOTES
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 She had multiple thromboembolic events in 2021 despite adherent anticoagulation use (though warfarin was perpetually low) and there are concerns for chronic thromboembolic disease but did not have pulmonary HTN on a November 2021 cath. She is maintained on chronic PO opioids and twice-weekly infusion visits (since 1/24/22) but has been able to be maintained on this regimen and has stayed out of the ED most of the time with even rarer admissions for most of 2023. In 2024, her ED visits have increased somewhat but admissions remain rare.    Interval History:  Jennifer is seen for routine hematology follow-up today. She was last seen in clinic with neck swelling and was evaluated in the ED and it appears to have been a reactive lymph node. The swelling has resolved since it was evaluated. She was not having much pain with it but a bit of neck tightness.     She said things at home are going reasonably well. She is doing well with her mom and they are taking care of her new niece while her sister figures things out. She did suggest that the amount of work it takes for her infant niece makes her not want kids of her own. She has some mild pain today and is being seen in infusion but wants to go down today on her oxycodone to 10 tabs/week, which is a great continued improvement. She is still dating her boyfriend, now going on about 2 years. She is not interested in pursuing work again right now but maybe in the  future.          Sickle Cell Disease Comprehensive Checklist  Bone Health/Avascular Necrosis Screening/Symptoms (each visit): no new concerns today  Leg Ulcer evaluation (every visit): nothing to note  Hypertension (every visit):stable none for several months  Last pulmonary evaluation (asthma, AMAN, pulm HTN): 9/28/22, due for follow-up  Stroke/silent cerebral infarct Hx (Y/N): Yes TIA ~2014, first event ~age 1 with full stroke and R sided weakness  Last PCP Visit: 9/30/24 with Dr. Case  Vaccines:  PCV13: 5/13/19  Pneumovax (PPSV23): 3/04, 10/09, 7/12/19, 10/2024, due in 2029  Menactra: 4/2010, 9/2015 (MCV done 8/16/21)  MenB: 9/16/15, 5/13/19 (due in 2024)  Influenza: 10/11/24  Audiology (chelation): done 2/27/24    Comparison to Previous Audiogram dated 6/6/2022:     Pure Tone Thresholds 250-8000 Hz:    RIGHT: stable    LEFT: stable     High Frequency Audiometry 9,000-16,000Hz:     RIGHT: stable    LEFT: stable     Word Recognition Score:    RIGHT: stable    LEFT: stable    Plan last reviewed with patient: 10/2/23    Patient background: 24 yo F, enjoys movies and kids though there are times where she does not really want to talk to people. Does not have a lot of social support at home.     Sickle Cell Disease History  Primary Hematologist Team: Jose Rafael Duncan  PCP: none  Genotype: SS  Acute Pain Crisis Treatment: (limiting IV)   ER   Dilaudid 1 mg IV q1h up to 3 doses  Toradol 30 mg IV x1   Maintenance IV fluids with LR  Other: Zofran 8 mg IV PRN nausea  Inpatient:  PCA Dilaudid 1 hr q30 minutes, no basal rate  Toradol 30 mg x6h x 4 hr  LR maintenance x 1-2 days  Other Medications: Zofran  ASA  Supportive Care: Docusate, Senna  Chronic Pain Medications:    Home regimen: oxycodone 15 mg p.o. q.4-6 hours p.r.n. breakthrough pain.  She also continues on Voltaren gel, and Zoloft among other medications.    -Also benefits from mental health visits, acupuncture  Baseline Hemoglobin: 7 g/dl without chronic  transfusions  Hydroxyurea use: Yes  H/O blood transfusions: Yes, several (iron overload) Most recent 11/20/2021  H/O Transfusion Reactions: no  Antibodies:none  H/O Acute Chest Syndrome: Yes  Last episode:9/05/22 (previously 4/26/21, 10/2019)   ICU/intubation: not with 9/2022 admission  H/O Stroke: Yes (managed with chronic transfusions in the past, stopped late Spring 2020)  H/O VTE: Yes (2/2021)  H/O Cholecystectomy or Splenectomy: no  H/O Asthma, Pulm HTN, AVN, Leg Ulcers, Nephropathy, Retinopathy, etc: Iron overload, asthma, chronic lung disease, physical limitations from early stroke    ---------------------------------------  Jennifer Cervantes's Goals (reviewed today 10/21/24)    1-3 month goal:  Continue to look for a job (but not urgent)    6 month goal:      12 month goal:      Disease-specific goal(s):  Decrease frequency of ED visits. Decrease opioid qty per weekly refill. She'd like to get down to 5 tablets per week by October--may be extended a bit      Current Outpatient Medications   Medication Sig Dispense Refill    albuterol (PROVENTIL) (2.5 MG/3ML) 0.083% neb solution Take 2 vials (5 mg) by nebulization every 6 hours as needed for shortness of breath or wheezing. 90 mL 3    deferasirox (JADENU) 360 MG tablet Take 4 tablets (1,440 mg) by mouth daily. 120 tablet 11    Deferiprone, Twice Daily, 1000 MG TABS Take 2,000 mg by mouth 2 times daily. 150 tablet 3    methocarbamol (ROBAXIN) 750 MG tablet Take 1 tablet (750 mg) by mouth 4 times daily as needed for muscle spasms (during sickle pain crises. Okay to take scheduled while in pain). 60 tablet 1    aspirin (ASA) 81 MG chewable tablet Take 1 tablet (81 mg) by mouth 2 times daily 60 tablet 11    budesonide-formoterol (SYMBICORT) 160-4.5 MCG/ACT Inhaler Inhale 2 puffs twice daily plus 1-2 puffs as needed. May use up to 12 puffs per day. 20.4 g 11    diphenhydrAMINE (BENADRYL) 50 MG capsule Take 1 capsule (50 mg) by mouth every 6 hours as needed for itching  or allergies. Administer 12  hours pre - IV contrast injection and patient must have a . 1 capsule 0    EPINEPHrine (ANY BX GENERIC EQUIV) 0.3 MG/0.3ML injection 2-pack Inject 0.3 mLs (0.3 mg) into the muscle as needed for anaphylaxis. 1 each 1    gabapentin (NEURONTIN) 300 MG capsule Take 1 capsule (300 mg) by mouth 3 times daily. Take PO 1 pill in evening (300 mg) TID to start. If tolerated, increase by 300 mg in morning or lunch every few days, updating your doctor's office in time to get refills. The maximum dose is 900 mg TID. (Patient taking differently: Take 600 mg by mouth at bedtime. Take PO 1 pill in evening (300 mg) TID to start. If tolerated, increase by 300 mg in morning or lunch every few days, updating your doctor's office in time to get refills. The maximum dose is 900 mg TID.) 90 capsule 1    hydroxyurea (HYDREA) 500 MG capsule Take 6 capsules (3,000 mg) by mouth daily 180 capsule 11    ibuprofen (ADVIL/MOTRIN) 800 MG tablet Take 800 mg by mouth every 8 hours as needed for moderate pain      melatonin 5 MG tablet Take 1 tablet (5 mg) by mouth nightly as needed for sleep 30 tablet 1    methylPREDNISolone (MEDROL) 32 MG tablet Take 1 tablet (32 mg) by mouth daily. 2 hours prior to the procedure with IV contrast 1 tablet 0    naloxone (NARCAN) 4 MG/0.1ML nasal spray Spray 4 mg into one nostril alternating nostrils as needed for opioid reversal. every 2-3 minutes until assistance arrives 0.2 mL 0    ondansetron (ZOFRAN ODT) 8 MG ODT tab Take 1 tablet (8 mg) by mouth every 8 hours as needed for nausea 40 tablet 4    oxyCODONE IR (ROXICODONE) 10 MG tablet Take 1 tablet (10 mg) by mouth every 6 hours as needed for severe pain or breakthrough pain. Goal 4 per day. Max 6 per day. 10 tablet 0     Past Medical History  Past Medical History:   Diagnosis Date    Anxiety     Bleeding disorder (H)     Blood clotting disorder (H)     Cerebral infarction (H) 2015    Cognitive developmental delay     low IQ.  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 side (H)     right hand contractures    Iron overload due to repeated red blood cell transfusions     Migraines     Multiple subsegmental pulmonary emboli without acute cor pulmonale (H) 02/01/2021    Oppositional defiant behavior     Presence of intrauterine contraceptive device 2/18/2020    Superficial venous thrombosis of arm, right 03/25/2021    Uncomplicated asthma      Past Surgical History:   Procedure Laterality Date    AS INSERT TUNNELED CV 2 CATH W/O PORT/PUMP      CHOLECYSTECTOMY      CV RIGHT HEART CATH MEASUREMENTS RECORDED N/A 11/18/2021    Procedure: Right Heart Cath;  Surgeon: Jackosn Stauffer MD;  Location:  [facility]ATH [facility]    INSERT PORT VASCULAR ACCESS Left 4/21/2021    Procedure: INSERTION, VASCULAR ACCESS PORT (NOT SURE ON SIDE UNTIL REMOVAL);  Surgeon: Rajan More MD;  Location: [facility] OR    IR CHEST PORT PLACEMENT > 5 YRS OF AGE  4/21/2021    IR CVC NON TUNNEL LINE REMOVAL  5/6/2021    IR CVC NON TUNNEL PLACEMENT > 5 YRS  04/07/2020    IR CVC NON TUNNEL PLACEMENT > 5 YRS  4/30/2021    IR CVC NON TUNNEL PLACEMENT > 5 YRS  9/7/2022    IR PORT REMOVAL LEFT  4/21/2021    REMOVE PORT VASCULAR ACCESS Left 4/21/2021    Procedure: REMOVAL, VASCULAR ACCESS PORT LEFT;  Surgeon: Rajan More MD;  Location: [facility] OR    REPAIR TENDON ELBOW Right 10/02/2019    Procedure: Right Elbow Flexor Lengthening, Flexor Pronator Slide Of Wrist and Finger, Thumb Adductor Lengthening;  Surgeon: Anai Franco MD;  Location: [facility] OR    TONSILLECTOMY Bilateral 10/02/2019    Procedure: Bilateral Tonsillectomy;  Surgeon: Farhana Guy MD;  Location: [facility] OR    ZZC BREAST REDUCTION (INCLUDES LIPO) TIER 3 Bilateral 04/23/2019     Allergies   Allergen Reactions    Contrast Dye Angioedema     Hives and breathing issues     Fish-Derived Products Hives    Seafood Hives    Adhesive Tape Hives     Primipore dressing causes hives    Gadolinium     Iodinated Contrast Media      Social History   Social History     Tobacco Use    Smoking status: Never     Passive exposure: Never    Smokeless tobacco: Never   Substance Use Topics    Alcohol use: Not Currently     Alcohol/week: 0.0 standard drinks of alcohol    Drug use: Never   Her mom lives next door to her.  Past medical history and social history were reviewed.    Physical Examination:  There were no vitals taken for this visit. (Vitals from infusion reviewed)      Wt Readings from Last 10 Encounters:   10/21/24 69.7 kg (153 lb 9.6 oz)   10/17/24 70.3 kg (155 lb)   10/17/24 70.9 kg (156 lb 3.2 oz)   10/12/24 70.3 kg (155 lb)   10/11/24 71 kg (156 lb 9.6 oz)   10/04/24 68.9 kg (152 lb)   09/26/24 69.4 kg (152 lb 14.4 oz)   09/15/24 70.3 kg (155 lb)   09/13/24 70.7 kg (155 lb 14.4 oz)   09/10/24 70 kg (154 lb 6.4 oz)     General: Pleasant female, NAD, in a good mood today  Eyes: EOMI, PERRL  Respiratory: Normal effort, no adventitious breath sounds  Ext: no peripheral edema  Neurologic: chronic contracture of right arm and wrist. Did not observe gait today. A/O x 4.  Skin: No rashes, petechiae, or bruising noted on exposed skin.   Laboratory Data:  Recent Results (from the past 240 hour(s))   Basic metabolic panel    Collection Time: 10/12/24  9:22 PM   Result Value Ref Range    Sodium 135 135 - 145 mmol/L    Potassium 3.6 3.4 - 5.3 mmol/L    Chloride 102 98 - 107 mmol/L    Carbon Dioxide (CO2) 22 22 - 29 mmol/L    Anion Gap 11 7 - 15 mmol/L    Urea Nitrogen 6.9 6.0 - 20.0 mg/dL    Creatinine 0.50 (L) 0.51 - 0.95 mg/dL    GFR Estimate >90 >60 mL/min/1.73m2    Calcium 8.3 (L) 8.8 - 10.4 mg/dL    Glucose 94 70 - 99 mg/dL   Reticulocyte count    Collection Time: 10/12/24  9:22 PM   Result Value Ref Range    % Reticulocyte 16.6 (H) 0.5 - 2.0 %    Absolute Reticulocyte 0.388 (H) 0.025 - 0.095  10e6/uL   CBC with platelets and differential    Collection Time: 10/12/24  9:22 PM   Result Value Ref Range    WBC Count 19.8 (H) 4.0 - 11.0 10e3/uL    RBC Count 2.34 (L) 3.80 - 5.20 10e6/uL    Hemoglobin 7.1 (L) 11.7 - 15.7 g/dL    Hematocrit 20.6 (L) 35.0 - 47.0 %    MCV 88 78 - 100 fL    MCH 30.3 26.5 - 33.0 pg    MCHC 34.5 31.5 - 36.5 g/dL    RDW 21.2 (H) 10.0 - 15.0 %    Platelet Count 469 (H) 150 - 450 10e3/uL    % Neutrophils 80 %    % Lymphocytes 10 %    % Monocytes 9 %    % Eosinophils 0 %    % Basophils 0 %    % Immature Granulocytes 1 %    NRBCs per 100 WBC 1 (H) <1 /100    Absolute Neutrophils 15.7 (H) 1.6 - 8.3 10e3/uL    Absolute Lymphocytes 1.9 0.8 - 5.3 10e3/uL    Absolute Monocytes 1.8 (H) 0.0 - 1.3 10e3/uL    Absolute Eosinophils 0.1 0.0 - 0.7 10e3/uL    Absolute Basophils 0.1 0.0 - 0.2 10e3/uL    Absolute Immature Granulocytes 0.1 <=0.4 10e3/uL    Absolute NRBCs 0.1 10e3/uL   Manual Differential    Collection Time: 10/12/24  9:22 PM   Result Value Ref Range    % Neutrophils 82 %    % Lymphocytes 14 %    % Monocytes 4 %    % Eosinophils 1 %    % Basophils 0 %    Absolute Neutrophils 16.1 (H) 1.6 - 8.3 10e3/uL    Absolute Lymphocytes 2.7 0.8 - 5.3 10e3/uL    Absolute Monocytes 0.7 0.0 - 1.3 10e3/uL    Absolute Eosinophils 0.2 0.0 - 0.7 10e3/uL    Absolute Basophils 0.0 0.0 - 0.2 10e3/uL    RBC Morphology Confirmed RBC Indices     Platelet Assessment  Automated Count Confirmed. Platelet morphology is normal.     Automated Count Confirmed. Platelet morphology is normal.    Sickle Cells Moderate (A) None Seen    Target Cells Slight (A) None Seen   Reticulocyte count    Collection Time: 10/16/24  2:19 PM   Result Value Ref Range    % Reticulocyte 9.6 (H) 0.5 - 2.0 %    Absolute Reticulocyte 0.216 (H) 0.025 - 0.095 10e6/uL   Routine UA with micro reflex to culture    Collection Time: 10/16/24  2:19 PM    Specimen: Urine, Clean Catch   Result Value Ref Range    Color Urine Yellow Colorless, Straw, Light  Yellow, Yellow    Appearance Urine Clear Clear    Glucose Urine Negative Negative mg/dL    Bilirubin Urine Negative Negative    Ketones Urine Negative Negative mg/dL    Specific Gravity Urine 1.012 1.003 - 1.035    Blood Urine Negative Negative    pH Urine 6.5 5.0 - 7.0    Protein Albumin Urine Negative Negative mg/dL    Urobilinogen Urine 2.0 Normal, 2.0 mg/dL    Nitrite Urine Negative Negative    Leukocyte Esterase Urine Negative Negative    Mucus Urine Present (A) None Seen /LPF    RBC Urine 0 <=2 /HPF    WBC Urine 1 <=5 /HPF    Squamous Epithelials Urine <1 <=1 /HPF   CBC with platelets and differential    Collection Time: 10/16/24  2:19 PM   Result Value Ref Range    WBC Count 13.5 (H) 4.0 - 11.0 10e3/uL    RBC Count 2.25 (L) 3.80 - 5.20 10e6/uL    Hemoglobin 6.6 (LL) 11.7 - 15.7 g/dL    Hematocrit 19.3 (L) 35.0 - 47.0 %    MCV 86 78 - 100 fL    MCH 29.3 26.5 - 33.0 pg    MCHC 34.2 31.5 - 36.5 g/dL    RDW 20.1 (H) 10.0 - 15.0 %    Platelet Count 568 (H) 150 - 450 10e3/uL    % Neutrophils 77 %    % Lymphocytes 13 %    % Monocytes 8 %    % Eosinophils 1 %    % Basophils 1 %    % Immature Granulocytes 0 %    NRBCs per 100 WBC 0 <1 /100    Absolute Neutrophils 10.3 (H) 1.6 - 8.3 10e3/uL    Absolute Lymphocytes 1.8 0.8 - 5.3 10e3/uL    Absolute Monocytes 1.0 0.0 - 1.3 10e3/uL    Absolute Eosinophils 0.2 0.0 - 0.7 10e3/uL    Absolute Basophils 0.1 0.0 - 0.2 10e3/uL    Absolute Immature Granulocytes 0.1 <=0.4 10e3/uL    Absolute NRBCs 0.1 10e3/uL   Basic metabolic panel    Collection Time: 10/17/24  6:42 PM   Result Value Ref Range    Sodium 139 135 - 145 mmol/L    Potassium 3.9 3.4 - 5.3 mmol/L    Chloride 106 98 - 107 mmol/L    Carbon Dioxide (CO2) 21 (L) 22 - 29 mmol/L    Anion Gap 12 7 - 15 mmol/L    Urea Nitrogen 6.2 6.0 - 20.0 mg/dL    Creatinine 0.47 (L) 0.51 - 0.95 mg/dL    GFR Estimate >90 >60 mL/min/1.73m2    Calcium 9.1 8.8 - 10.4 mg/dL    Glucose 85 70 - 99 mg/dL   CBC with platelets and differential     Collection Time: 10/17/24  6:42 PM   Result Value Ref Range    WBC Count 11.4 (H) 4.0 - 11.0 10e3/uL    RBC Count 2.34 (L) 3.80 - 5.20 10e6/uL    Hemoglobin 6.7 (LL) 11.7 - 15.7 g/dL    Hematocrit 20.4 (L) 35.0 - 47.0 %    MCV 87 78 - 100 fL    MCH 28.6 26.5 - 33.0 pg    MCHC 32.8 31.5 - 36.5 g/dL    RDW 20.5 (H) 10.0 - 15.0 %    Platelet Count 549 (H) 150 - 450 10e3/uL    % Neutrophils 69 %    % Lymphocytes 18 %    % Monocytes 8 %    % Eosinophils 2 %    % Basophils 2 %    % Immature Granulocytes 0 %    NRBCs per 100 WBC 0 <1 /100    Absolute Neutrophils 7.9 1.6 - 8.3 10e3/uL    Absolute Lymphocytes 2.0 0.8 - 5.3 10e3/uL    Absolute Monocytes 1.0 0.0 - 1.3 10e3/uL    Absolute Eosinophils 0.3 0.0 - 0.7 10e3/uL    Absolute Basophils 0.2 0.0 - 0.2 10e3/uL    Absolute Immature Granulocytes 0.0 <=0.4 10e3/uL    Absolute NRBCs 0.0 10e3/uL   Symptomatic Influenza A/B, RSV, & SARS-CoV2 PCR (COVID-19) Nasopharyngeal    Collection Time: 10/17/24  9:12 PM    Specimen: Nasopharyngeal; Swab   Result Value Ref Range    Influenza A PCR Negative Negative    Influenza B PCR Negative Negative    RSV PCR Negative Negative    SARS CoV2 PCR Negative Negative   Prepare red blood cells (unit)    Collection Time: 10/20/24  7:27 AM   Result Value Ref Range    Blood Component Type Red Blood Cells     Product Code [id number]     Unit Status Transfused     Unit Number [id number]     CROSSMATCH Compatible     CODING SYSTEM [id number]     ISSUE DATE AND TIME [id number]     UNIT ABO/RH O-     UNIT TYPE ISBT 9500    Adult Type and Screen    Collection Time: 10/21/24  1:32 PM   Result Value Ref Range    ABO/RH(D) O POS     Antibody Screen Negative Negative    SPECIMEN EXPIRATION DATE [id number]    CBC with platelets and differential    Collection Time: 10/21/24  1:32 PM   Result Value Ref Range    WBC Count 9.6 4.0 - 11.0 10e3/uL    RBC Count 2.64 (L) 3.80 - 5.20 10e6/uL    Hemoglobin 7.3 (L) 11.7 - 15.7 g/dL    Hematocrit 22.4 (L) 35.0 -  47.0 %    MCV 85 78 - 100 fL    MCH 27.7 26.5 - 33.0 pg    MCHC 32.6 31.5 - 36.5 g/dL    RDW 22.7 (H) 10.0 - 15.0 %    Platelet Count 753 (H) 150 - 450 10e3/uL    % Neutrophils 67 %    % Lymphocytes 22 %    % Monocytes 5 %    % Eosinophils 4 %    % Basophils 3 %    % Immature Granulocytes 0 %    NRBCs per 100 WBC 1 (H) <1 /100    Absolute Neutrophils 6.4 1.6 - 8.3 10e3/uL    Absolute Lymphocytes 2.1 0.8 - 5.3 10e3/uL    Absolute Monocytes 0.5 0.0 - 1.3 10e3/uL    Absolute Eosinophils 0.3 0.0 - 0.7 10e3/uL    Absolute Basophils 0.3 (H) 0.0 - 0.2 10e3/uL    Absolute Immature Granulocytes 0.0 <=0.4 10e3/uL    Absolute NRBCs 0.1 10e3/uL     I reviewed the above labs today.    Assessment and Plan:  1. Sickle Cell HgbSS Disease  2. Acute pain crises  3. Chronic Pain  4. 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 History of CVA  7. Hearing loss  8. Nausea/vomiting       Jennifer is seen today for routine follow-up. Her pain is pretty good today.    She is due for CT Abdomen/Pelvis and EGD for some intermittent abdominal pain, nausea and vomiting but this has been better as of late.      Regarding her sickle cell management, she continues to make an effort to reduce her oral oxycodone use at home.  The work with NP Patricia Mantilla has really paid off, as has Jennifer's own personal drive to improve her regimen. We are going to go down to 10 tabs/week. She ultimately is hoping to get to 5 tabs/week soon. As was done at previous visits, I encouraged her to try to extend the length of her prescriptions for greater than 1 week to extend the time windows.      I encouraged her to continue to attempt to decrease her ED visits as able.  For now we are continuing to limit her infusion visits at 2 times per week.  In the future we we will attempt to decrease this further although need to maintain a stepwise approach to reducing her opioid use.    There is some confusion over her iron  chelation.  She remains on Jadenu and is taking this regularly.  Deferiprone was added to her regimen earlier this year although she states she has not received this medication for a few months now.  She is due for repeat for a scan next month which is ordered for November. I added Deferiprone back as well.      -------------------------------------  Plan:  -Continue Hydrea to 3000mg daily to help lessen frequency of sickle cell pain.   -Continue monthly crizanlizumab infusions  -Continue slow taper of oxycodone. Currently receiving oxycodone 10 mg every 4 hours PRN, qty 10.    -She can self-reduce infusion days/week and we will continue to keep the cap at 2/week for now.   -Continue infusion center visits limited to two times per week (Mondays and Fridays ideally although still needs to call to request). Continue diligent home management with current medications, heat, rest, compression, warm baths.   -If unable to manage at home can go to ED  with continued plan to use IV Dilaudid and take a break from ketamine (10/2/23). No PCA use and goal for any admission would still be to discharge by 5 days or less  -EGD for evaluation of ongoing n/v and abdominal pain, scheduled in November.  CT abdomen/pelvis also due as ordered by Dr. Case.  Encourage completion of stool studies for calprotectin and H. pylori.  -Continue metoclopramide 5 mg TID PRN  - Continue Jadenu for iron overload.  Adding Deferiprone but also due for Ferriscan for iron overload monitoring.  -Continue aspirin BID  -Due at some point for Meningitis B booster.  -RTC with Patricia Mantilla on 10/31        Eric Duncan MD  Classical Hematologist  Division of Hematology, Oncology, and Transplantation  [facility]  [employer] [facility]  Pager: [phone]      ---  45 minutes spent on the date of the encounter doing chart review, review of test results, interpretation of tests, patient visit, and documentation.    The  longitudinal plan of care for the diagnosis(es)/condition(s) as documented were addressed during this visit. Due to the added complexity in care, I will continue to support Jennifer in the subsequent management and with ongoing continuity of care.

## 2024-10-23 NOTE — ED TRIAGE NOTES
Patient reports getting a blood transfusion yesterday and now she is having pain from it. Patient reports sharp pain in her stomach and body. Patient reports generalized sharp body aches.

## 2024-10-24 ENCOUNTER — HOSPITAL ENCOUNTER (EMERGENCY)
Facility: CLINIC | Age: 24
Discharge: HOME | End: 2024-10-25
Attending: STUDENT IN AN ORGANIZED HEALTH CARE EDUCATION/TRAINING PROGRAM | Admitting: STUDENT IN AN ORGANIZED HEALTH CARE EDUCATION/TRAINING PROGRAM
Payer: COMMERCIAL

## 2024-10-24 DIAGNOSIS — R10.13 EPIGASTRIC PAIN: ICD-10-CM

## 2024-10-24 DIAGNOSIS — D57.00 SICKLE CELL DISEASE WITH CRISIS (H): ICD-10-CM

## 2024-10-24 LAB
ALBUMIN SERPL BCG-MCNC: 4 G/DL (ref 3.5–5.2)
ALP SERPL-CCNC: 54 U/L (ref 40–150)
ALT SERPL W P-5'-P-CCNC: 22 U/L (ref 0–50)
ANION GAP SERPL CALCULATED.3IONS-SCNC: 7 MMOL/L (ref 7–15)
AST SERPL W P-5'-P-CCNC: 30 U/L (ref 0–45)
BASOPHILS # BLD AUTO: 0.2 10E3/UL (ref 0–0.2)
BASOPHILS NFR BLD AUTO: 2 %
BILIRUB SERPL-MCNC: 1.4 MG/DL
BUN SERPL-MCNC: 8.9 MG/DL (ref 6–20)
CALCIUM SERPL-MCNC: 8.6 MG/DL (ref 8.8–10.4)
CHLORIDE SERPL-SCNC: 106 MMOL/L (ref 98–107)
CREAT SERPL-MCNC: 0.7 MG/DL (ref 0.51–0.95)
EGFRCR SERPLBLD CKD-EPI 2021: >90 ML/MIN/1.73M2
EOSINOPHIL # BLD AUTO: 0.2 10E3/UL (ref 0–0.7)
EOSINOPHIL NFR BLD AUTO: 2 %
ERYTHROCYTE [DISTWIDTH] IN BLOOD BY AUTOMATED COUNT: 21.1 % (ref 10–15)
GLUCOSE SERPL-MCNC: 87 MG/DL (ref 70–99)
HCG SERPL QL: NEGATIVE
HCO3 SERPL-SCNC: 26 MMOL/L (ref 22–29)
HCT VFR BLD AUTO: 23.7 % (ref 35–47)
HGB BLD-MCNC: 8 G/DL (ref 11.7–15.7)
IMM GRANULOCYTES # BLD: 0 10E3/UL
IMM GRANULOCYTES NFR BLD: 0 %
INR PPP: 1.14 (ref 0.85–1.15)
LIPASE SERPL-CCNC: 29 U/L (ref 13–60)
LYMPHOCYTES # BLD AUTO: 2.9 10E3/UL (ref 0.8–5.3)
LYMPHOCYTES NFR BLD AUTO: 25 %
MCH RBC QN AUTO: 29.4 PG (ref 26.5–33)
MCHC RBC AUTO-ENTMCNC: 33.8 G/DL (ref 31.5–36.5)
MCV RBC AUTO: 87 FL (ref 78–100)
MONOCYTES # BLD AUTO: 0.7 10E3/UL (ref 0–1.3)
MONOCYTES NFR BLD AUTO: 6 %
NEUTROPHILS # BLD AUTO: 7.8 10E3/UL (ref 1.6–8.3)
NEUTROPHILS NFR BLD AUTO: 66 %
NRBC # BLD AUTO: 0 10E3/UL
NRBC BLD AUTO-RTO: 0 /100
PLATELET # BLD AUTO: 700 10E3/UL (ref 150–450)
POTASSIUM SERPL-SCNC: 3.7 MMOL/L (ref 3.4–5.3)
PROT SERPL-MCNC: 7 G/DL (ref 6.4–8.3)
RBC # BLD AUTO: 2.72 10E6/UL (ref 3.8–5.2)
SODIUM SERPL-SCNC: 139 MMOL/L (ref 135–145)
TROPONIN T SERPL HS-MCNC: <6 NG/L
WBC # BLD AUTO: 11.8 10E3/UL (ref 4–11)

## 2024-10-24 PROCEDURE — 84484 ASSAY OF TROPONIN QUANT: CPT | Performed by: STUDENT IN AN ORGANIZED HEALTH CARE EDUCATION/TRAINING PROGRAM

## 2024-10-24 PROCEDURE — 99284 EMERGENCY DEPT VISIT MOD MDM: CPT | Performed by: STUDENT IN AN ORGANIZED HEALTH CARE EDUCATION/TRAINING PROGRAM

## 2024-10-24 PROCEDURE — 96374 THER/PROPH/DIAG INJ IV PUSH: CPT | Performed by: STUDENT IN AN ORGANIZED HEALTH CARE EDUCATION/TRAINING PROGRAM

## 2024-10-24 PROCEDURE — 36415 COLL VENOUS BLD VENIPUNCTURE: CPT | Performed by: STUDENT IN AN ORGANIZED HEALTH CARE EDUCATION/TRAINING PROGRAM

## 2024-10-24 PROCEDURE — 85025 COMPLETE CBC W/AUTO DIFF WBC: CPT | Performed by: STUDENT IN AN ORGANIZED HEALTH CARE EDUCATION/TRAINING PROGRAM

## 2024-10-24 PROCEDURE — 85610 PROTHROMBIN TIME: CPT | Performed by: STUDENT IN AN ORGANIZED HEALTH CARE EDUCATION/TRAINING PROGRAM

## 2024-10-24 PROCEDURE — 84703 CHORIONIC GONADOTROPIN ASSAY: CPT | Performed by: STUDENT IN AN ORGANIZED HEALTH CARE EDUCATION/TRAINING PROGRAM

## 2024-10-24 PROCEDURE — 96361 HYDRATE IV INFUSION ADD-ON: CPT | Performed by: STUDENT IN AN ORGANIZED HEALTH CARE EDUCATION/TRAINING PROGRAM

## 2024-10-24 PROCEDURE — 84702 CHORIONIC GONADOTROPIN TEST: CPT | Performed by: STUDENT IN AN ORGANIZED HEALTH CARE EDUCATION/TRAINING PROGRAM

## 2024-10-24 PROCEDURE — 85045 AUTOMATED RETICULOCYTE COUNT: CPT | Performed by: STUDENT IN AN ORGANIZED HEALTH CARE EDUCATION/TRAINING PROGRAM

## 2024-10-24 PROCEDURE — 80053 COMPREHEN METABOLIC PANEL: CPT | Performed by: STUDENT IN AN ORGANIZED HEALTH CARE EDUCATION/TRAINING PROGRAM

## 2024-10-24 PROCEDURE — 258N000003 HC RX IP 258 OP 636: Performed by: STUDENT IN AN ORGANIZED HEALTH CARE EDUCATION/TRAINING PROGRAM

## 2024-10-24 PROCEDURE — 83690 ASSAY OF LIPASE: CPT | Performed by: STUDENT IN AN ORGANIZED HEALTH CARE EDUCATION/TRAINING PROGRAM

## 2024-10-24 PROCEDURE — 99285 EMERGENCY DEPT VISIT HI MDM: CPT | Mod: 25 | Performed by: STUDENT IN AN ORGANIZED HEALTH CARE EDUCATION/TRAINING PROGRAM

## 2024-10-24 PROCEDURE — 250N000011 HC RX IP 250 OP 636: Performed by: STUDENT IN AN ORGANIZED HEALTH CARE EDUCATION/TRAINING PROGRAM

## 2024-10-24 RX ORDER — SODIUM CHLORIDE, SODIUM LACTATE, POTASSIUM CHLORIDE, CALCIUM CHLORIDE 600; 310; 30; 20 MG/100ML; MG/100ML; MG/100ML; MG/100ML
INJECTION, SOLUTION INTRAVENOUS CONTINUOUS
Status: DISCONTINUED | OUTPATIENT
Start: 2024-10-24 | End: 2024-10-25 | Stop reason: HOSPADM

## 2024-10-24 RX ORDER — ONDANSETRON 2 MG/ML
8 INJECTION INTRAMUSCULAR; INTRAVENOUS EVERY 8 HOURS PRN
Status: DISCONTINUED | OUTPATIENT
Start: 2024-10-24 | End: 2024-10-25 | Stop reason: HOSPADM

## 2024-10-24 RX ORDER — KETOROLAC TROMETHAMINE 30 MG/ML
30 INJECTION, SOLUTION INTRAMUSCULAR; INTRAVENOUS ONCE
Status: COMPLETED | OUTPATIENT
Start: 2024-10-24 | End: 2024-10-24

## 2024-10-24 RX ADMIN — KETOROLAC TROMETHAMINE 30 MG: 30 INJECTION, SOLUTION INTRAMUSCULAR at 23:17

## 2024-10-24 RX ADMIN — ONDANSETRON 8 MG: 2 INJECTION INTRAMUSCULAR; INTRAVENOUS at 23:16

## 2024-10-24 RX ADMIN — SODIUM CHLORIDE, POTASSIUM CHLORIDE, SODIUM LACTATE AND CALCIUM CHLORIDE 1000 ML: 600; 310; 30; 20 INJECTION, SOLUTION INTRAVENOUS at 23:17

## 2024-10-24 RX ADMIN — HYDROMORPHONE HYDROCHLORIDE 1 MG: 1 INJECTION, SOLUTION INTRAMUSCULAR; INTRAVENOUS; SUBCUTANEOUS at 23:17

## 2024-10-24 ASSESSMENT — ACTIVITIES OF DAILY LIVING (ADL): ADLS_ACUITY_SCORE: 0

## 2024-10-25 ENCOUNTER — APPOINTMENT (OUTPATIENT)
Dept: CT IMAGING | Facility: CLINIC | Age: 24
End: 2024-10-25
Attending: STUDENT IN AN ORGANIZED HEALTH CARE EDUCATION/TRAINING PROGRAM
Payer: COMMERCIAL

## 2024-10-25 VITALS
WEIGHT: 153 LBS | DIASTOLIC BLOOD PRESSURE: 73 MMHG | HEART RATE: 79 BPM | TEMPERATURE: 98 F | SYSTOLIC BLOOD PRESSURE: 112 MMHG | HEIGHT: 64 IN | OXYGEN SATURATION: 92 % | BODY MASS INDEX: 26.12 KG/M2 | RESPIRATION RATE: 16 BRPM

## 2024-10-25 LAB
ATRIAL RATE - MUSE: 72 BPM
DIASTOLIC BLOOD PRESSURE - MUSE: NORMAL MMHG
HCG INTACT+B SERPL-ACNC: <1 MIU/ML
INTERPRETATION ECG - MUSE: NORMAL
P AXIS - MUSE: 78 DEGREES
PR INTERVAL - MUSE: 154 MS
QRS DURATION - MUSE: 76 MS
QT - MUSE: 404 MS
QTC - MUSE: 442 MS
R AXIS - MUSE: 46 DEGREES
RETICS # AUTO: 0.23 10E6/UL (ref 0.03–0.1)
RETICS/RBC NFR AUTO: 7.7 % (ref 0.5–2)
SYSTOLIC BLOOD PRESSURE - MUSE: NORMAL MMHG
T AXIS - MUSE: 32 DEGREES
VENTRICULAR RATE- MUSE: 72 BPM

## 2024-10-25 PROCEDURE — 258N000003 HC RX IP 258 OP 636: Performed by: STUDENT IN AN ORGANIZED HEALTH CARE EDUCATION/TRAINING PROGRAM

## 2024-10-25 PROCEDURE — 250N000013 HC RX MED GY IP 250 OP 250 PS 637: Performed by: STUDENT IN AN ORGANIZED HEALTH CARE EDUCATION/TRAINING PROGRAM

## 2024-10-25 PROCEDURE — 96361 HYDRATE IV INFUSION ADD-ON: CPT | Performed by: STUDENT IN AN ORGANIZED HEALTH CARE EDUCATION/TRAINING PROGRAM

## 2024-10-25 PROCEDURE — 96376 TX/PRO/DX INJ SAME DRUG ADON: CPT | Performed by: STUDENT IN AN ORGANIZED HEALTH CARE EDUCATION/TRAINING PROGRAM

## 2024-10-25 PROCEDURE — 250N000011 HC RX IP 250 OP 636: Performed by: EMERGENCY MEDICINE

## 2024-10-25 PROCEDURE — 96374 THER/PROPH/DIAG INJ IV PUSH: CPT | Performed by: STUDENT IN AN ORGANIZED HEALTH CARE EDUCATION/TRAINING PROGRAM

## 2024-10-25 PROCEDURE — 250N000009 HC RX 250: Performed by: STUDENT IN AN ORGANIZED HEALTH CARE EDUCATION/TRAINING PROGRAM

## 2024-10-25 PROCEDURE — 250N000011 HC RX IP 250 OP 636: Performed by: STUDENT IN AN ORGANIZED HEALTH CARE EDUCATION/TRAINING PROGRAM

## 2024-10-25 PROCEDURE — 96375 TX/PRO/DX INJ NEW DRUG ADDON: CPT | Performed by: STUDENT IN AN ORGANIZED HEALTH CARE EDUCATION/TRAINING PROGRAM

## 2024-10-25 PROCEDURE — 74176 CT ABD & PELVIS W/O CONTRAST: CPT

## 2024-10-25 RX ORDER — LIDOCAINE HYDROCHLORIDE 20 MG/ML
10 SOLUTION OROPHARYNGEAL ONCE
Status: COMPLETED | OUTPATIENT
Start: 2024-10-25 | End: 2024-10-25

## 2024-10-25 RX ORDER — HEPARIN SODIUM (PORCINE) LOCK FLUSH IV SOLN 100 UNIT/ML 100 UNIT/ML
100 SOLUTION INTRAVENOUS ONCE
Status: COMPLETED | OUTPATIENT
Start: 2024-10-25 | End: 2024-10-25

## 2024-10-25 RX ORDER — PANTOPRAZOLE SODIUM 40 MG/1
40 TABLET, DELAYED RELEASE ORAL DAILY
Qty: 30 TABLET | Refills: 0 | Status: SHIPPED | OUTPATIENT
Start: 2024-10-25 | End: 2024-11-24

## 2024-10-25 RX ORDER — MAGNESIUM HYDROXIDE/ALUMINUM HYDROXICE/SIMETHICONE 120; 1200; 1200 MG/30ML; MG/30ML; MG/30ML
15 SUSPENSION ORAL ONCE
Status: COMPLETED | OUTPATIENT
Start: 2024-10-25 | End: 2024-10-25

## 2024-10-25 RX ADMIN — HYDROMORPHONE HYDROCHLORIDE 1 MG: 1 INJECTION, SOLUTION INTRAMUSCULAR; INTRAVENOUS; SUBCUTANEOUS at 02:26

## 2024-10-25 RX ADMIN — PANTOPRAZOLE SODIUM 40 MG: 40 INJECTION, POWDER, FOR SOLUTION INTRAVENOUS at 00:43

## 2024-10-25 RX ADMIN — HYDROMORPHONE HYDROCHLORIDE 1 MG: 1 INJECTION, SOLUTION INTRAMUSCULAR; INTRAVENOUS; SUBCUTANEOUS at 00:43

## 2024-10-25 RX ADMIN — ALUMINUM HYDROXIDE, MAGNESIUM HYDROXIDE, AND SIMETHICONE 15 ML: 200; 200; 20 SUSPENSION ORAL at 00:43

## 2024-10-25 RX ADMIN — HEPARIN 100 UNITS: 100 SYRINGE at 04:36

## 2024-10-25 RX ADMIN — SODIUM CHLORIDE, POTASSIUM CHLORIDE, SODIUM LACTATE AND CALCIUM CHLORIDE: 600; 310; 30; 20 INJECTION, SOLUTION INTRAVENOUS at 01:18

## 2024-10-25 RX ADMIN — LIDOCAINE HYDROCHLORIDE 10 ML: 20 SOLUTION ORAL at 00:42

## 2024-10-25 ASSESSMENT — ACTIVITIES OF DAILY LIVING (ADL)
ADLS_ACUITY_SCORE: 0

## 2024-10-25 NOTE — ED NOTES
Pt states she is unable to urinate at this time for HcG for CT. Pt was seen in the Ed yesterday with a resulted negative HcG. Pt denies any possibility of being pregnant between her visit yesterday and today's encounter. Pt sent to CT for imaging of her abdomen and pelvis.

## 2024-10-25 NOTE — ED PROVIDER NOTES
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  Patient reports pain began 2 days ago following transfusion therapy.  She was evaluated for this here in the ED yesterday.  Workup and exam showed no acute pathology.  Her pain improved with her regular treatment plan and she was discharged home.  She reports that the pain came back earlier today.  The pain is more along the left side of her abdomen, but is not localized to any one spot.  She has already discussed this with her care team, as that happens frequently after transfusions, and they plan to do a full diagnostic workup in early November.  Today she reports feeling like she is having both the abdominal pain and her regular sickle cell pain, but the stomach pain is worse.  She denies any chest pain or shortness of breath.  Denies any fevers she reports she usually has vomiting with these episodes of abdominal pain but has not yet today.  She has only been able to eat once today.  This did not seem to affect her pain.  She denies history of diabetes or use of injectable medications such as Ozempic.      Past Medical History  Past Medical History:   Diagnosis Date    Anxiety     Bleeding disorder (H)     Blood clotting disorder (H)     Cerebral infarction (H) 2015    Cognitive developmental delay     low IQ. Please recognize when managing pain and planning with her    Depressive disorder      Hemiplegia and hemiparesis following cerebral infarction affecting right dominant side (H)     right hand contractures    Iron overload due to repeated red blood cell transfusions     Migraines     Multiple subsegmental pulmonary emboli without acute cor pulmonale (H) 02/01/2021    Oppositional defiant behavior     Presence of intrauterine contraceptive device 2/18/2020    Superficial venous thrombosis of arm, right 03/25/2021    Uncomplicated asthma      Past Surgical History:   Procedure Laterality Date    AS INSERT TUNNELED CV 2 CATH W/O PORT/PUMP      CHOLECYSTECTOMY      CV RIGHT HEART CATH MEASUREMENTS RECORDED N/A 11/18/2021    Procedure: Right Heart Cath;  Surgeon: Jackson Stauffer MD;  Location:  HEART CARDIAC CATH LAB    INSERT PORT VASCULAR ACCESS Left 4/21/2021    Procedure: INSERTION, VASCULAR ACCESS PORT (NOT SURE ON SIDE UNTIL REMOVAL);  Surgeon: Rajan More MD;  Location: [facility] OR    IR CHEST PORT PLACEMENT > 5 YRS OF AGE  4/21/2021    IR CVC NON TUNNEL LINE REMOVAL  5/6/2021    IR CVC NON TUNNEL PLACEMENT > 5 YRS  04/07/2020    IR CVC NON TUNNEL PLACEMENT > 5 YRS  4/30/2021    IR CVC NON TUNNEL PLACEMENT > 5 YRS  9/7/2022    IR PORT REMOVAL LEFT  4/21/2021    REMOVE PORT VASCULAR ACCESS Left 4/21/2021    Procedure: REMOVAL, VASCULAR ACCESS PORT LEFT;  Surgeon: Rajan More MD;  Location: [facility] OR    REPAIR TENDON ELBOW Right 10/02/2019    Procedure: Right Elbow Flexor Lengthening, Flexor Pronator Slide Of Wrist and Finger, Thumb Adductor Lengthening;  Surgeon: Anai Franco MD;  Location: [facility] OR    TONSILLECTOMY Bilateral 10/02/2019    Procedure: Bilateral Tonsillectomy;  Surgeon: Farhana Guy MD;  Location: [facility] OR    ZZC BREAST REDUCTION (INCLUDES LIPO) TIER 3 Bilateral 04/23/2019     aspirin (ASA) 81 MG chewable tablet  budesonide-formoterol (SYMBICORT) 160-4.5 MCG/ACT Inhaler  diphenhydrAMINE (BENADRYL) 50 MG capsule  gabapentin (NEURONTIN) 300 MG capsule  ibuprofen  "(ADVIL/MOTRIN) 800 MG tablet  methocarbamol (ROBAXIN) 750 MG tablet  oxyCODONE IR (ROXICODONE) 10 MG tablet  albuterol (PROVENTIL) (2.5 MG/3ML) 0.083% neb solution  deferasirox (JADENU) 360 MG tablet  Deferiprone, Twice Daily, 1000 MG TABS  EPINEPHrine (ANY BX GENERIC EQUIV) 0.3 MG/0.3ML injection 2-pack  hydroxyurea (HYDREA) 500 MG capsule  melatonin 5 MG tablet  methylPREDNISolone (MEDROL) 32 MG tablet  naloxone (NARCAN) 4 MG/0.1ML nasal spray  ondansetron (ZOFRAN ODT) 8 MG ODT tab  pantoprazole (PROTONIX) 40 MG EC tablet      Allergies   Allergen Reactions    Contrast Dye Angioedema     Hives and breathing issues    Fish-Derived Products Hives    Seafood Hives    Adhesive Tape Hives     Primipore dressing causes hives    Gadolinium     Iodinated Contrast Media      Family History  Family History   Problem Relation Age of Onset    Sickle Cell Trait Mother     Hypertension Mother     Asthma Mother     Sickle Cell Trait Father     Glaucoma No family hx of     Macular Degeneration No family hx of     Diabetes No family hx of     Gout No family hx of      Social History   Social History     Tobacco Use    Smoking status: Never     Passive exposure: Never    Smokeless tobacco: Never   Substance Use Topics    Alcohol use: Not Currently     Alcohol/week: 0.0 standard drinks of alcohol    Drug use: Never      Past medical history, past surgical history, medications, allergies, family history, and social history were reviewed with the patient. No additional pertinent items.     A complete review of systems was performed with pertinent positives and negatives noted in the HPI, and all other systems negative.    Physical Exam   BP: 124/81  Pulse: 84  Temp: 98.1  F (36.7  C)  Resp: 16  Height: 162.6 cm (5' 4\")  Weight: 69.4 kg (153 lb)  SpO2: 96 %  Physical Exam  Vital Signs Reviewed  Gen: Well nourished, well developed, resting comfortably, no acute distress  HEENT: NC/AT, PERRL, EOMI, MMM  Neck: Supple, FROM  CV: Regular " Rate  Lungs/Chest: Normal Effort  Abd: Non-distended, no significant tenderness rigidity rebound or guarding  MSK/Back: FROM, no visible deformity  Neuro: A&Ox3, GCS 15, CN II-XII unremarkable. Strength and sensation globally intact.  Skin: Warm, Dry, Intact, no visible lesions    ED Course, Procedures, & Data      Procedures                Results for orders placed or performed during the hospital encounter of 10/24/24   INR     Status: Normal   Result Value Ref Range    INR 1.14 0.85 - 1.15   Comprehensive metabolic panel     Status: Abnormal   Result Value Ref Range    Sodium 139 135 - 145 mmol/L    Potassium 3.7 3.4 - 5.3 mmol/L    Carbon Dioxide (CO2) 26 22 - 29 mmol/L    Anion Gap 7 7 - 15 mmol/L    Urea Nitrogen 8.9 6.0 - 20.0 mg/dL    Creatinine 0.70 0.51 - 0.95 mg/dL    GFR Estimate >90 >60 mL/min/1.73m2    Calcium 8.6 (L) 8.8 - 10.4 mg/dL    Chloride 106 98 - 107 mmol/L    Glucose 87 70 - 99 mg/dL    Alkaline Phosphatase 54 40 - 150 U/L    AST 30 0 - 45 U/L    ALT 22 0 - 50 U/L    Protein Total 7.0 6.4 - 8.3 g/dL    Albumin 4.0 3.5 - 5.2 g/dL    Bilirubin Total 1.4 (H) <=1.2 mg/dL   Lipase     Status: Normal   Result Value Ref Range    Lipase 29 13 - 60 U/L   Troponin T, High Sensitivity     Status: Normal   Result Value Ref Range    Troponin T, High Sensitivity <6 <=14 ng/L   HCG qualitative Blood     Status: Normal   Result Value Ref Range    hCG Serum Qualitative Negative Negative   CBC with platelets and differential     Status: Abnormal   Result Value Ref Range    WBC Count 11.8 (H) 4.0 - 11.0 10e3/uL    RBC Count 2.72 (L) 3.80 - 5.20 10e6/uL    Hemoglobin 8.0 (L) 11.7 - 15.7 g/dL    Hematocrit 23.7 (L) 35.0 - 47.0 %    MCV 87 78 - 100 fL    MCH 29.4 26.5 - 33.0 pg    MCHC 33.8 31.5 - 36.5 g/dL    RDW 21.1 (H) 10.0 - 15.0 %    Platelet Count 700 (H) 150 - 450 10e3/uL    % Neutrophils 66 %    % Lymphocytes 25 %    % Monocytes 6 %    % Eosinophils 2 %    % Basophils 2 %    % Immature Granulocytes 0 %     NRBCs per 100 WBC 0 <1 /100    Absolute Neutrophils 7.8 1.6 - 8.3 10e3/uL    Absolute Lymphocytes 2.9 0.8 - 5.3 10e3/uL    Absolute Monocytes 0.7 0.0 - 1.3 10e3/uL    Absolute Eosinophils 0.2 0.0 - 0.7 10e3/uL    Absolute Basophils 0.2 0.0 - 0.2 10e3/uL    Absolute Immature Granulocytes 0.0 <=0.4 10e3/uL    Absolute NRBCs 0.0 10e3/uL   Reticulocyte count     Status: Abnormal   Result Value Ref Range    % Reticulocyte 7.7 (H) 0.5 - 2.0 %    Absolute Reticulocyte 0.232 (H) 0.025 - 0.095 10e6/uL   HCG quantitative pregnancy (blood)     Status: Normal   Result Value Ref Range    hCG Quantitative <1 <5 mIU/mL   EKG 12-lead, tracing only     Status: None (Preliminary result)   Result Value Ref Range    Systolic Blood Pressure  mmHg    Diastolic Blood Pressure  mmHg    Ventricular Rate 72 BPM    Atrial Rate 72 BPM    ID Interval 154 ms    QRS Duration 76 ms     ms    QTc 442 ms    P Axis 78 degrees    R AXIS 46 degrees    T Axis 32 degrees    Interpretation ECG Sinus rhythm  Normal ECG      CBC with platelets differential     Status: Abnormal    Narrative    The following orders were created for panel order CBC with platelets differential.  Procedure                               Abnormality         Status                     ---------                               -----------         ------                     CBC with platelets and d...[[id number]]  Abnormal            Final result                 Please view results for these tests on the individual orders.     Medications   HYDROmorphone (DILAUDID) injection 1 mg (1 mg Intravenous $Given 10/25/24 0043)   ondansetron (ZOFRAN) injection 8 mg (8 mg Intravenous $Given 10/24/24 2316)   lactated ringers infusion ( Intravenous Canceled Entry 10/25/24 0125)   ketorolac (TORADOL) injection 30 mg (30 mg Intravenous $Given 10/24/24 2317)   lactated ringers BOLUS 1,000 mL (0 mLs Intravenous Stopped 10/25/24 0125)   pantoprazole (PROTONIX) IV push injection 40 mg (40 mg  Intravenous $Given 10/25/24 0043)   alum & mag hydroxide-simethicone (MAALOX) suspension 15 mL (15 mLs Oral $Given 10/25/24 0043)   lidocaine (viscous) (XYLOCAINE) 2 % solution 10 mL (10 mLs Mouth/Throat $Given 10/25/24 0042)     Labs Ordered and Resulted from Time of ED Arrival to Time of ED Departure   COMPREHENSIVE METABOLIC PANEL - Abnormal       Result Value    Sodium 139      Potassium 3.7      Carbon Dioxide (CO2) 26      Anion Gap 7      Urea Nitrogen 8.9      Creatinine 0.70      GFR Estimate >90      Calcium 8.6 (*)     Chloride 106      Glucose 87      Alkaline Phosphatase 54      AST 30      ALT 22      Protein Total 7.0      Albumin 4.0      Bilirubin Total 1.4 (*)    CBC WITH PLATELETS AND DIFFERENTIAL - Abnormal    WBC Count 11.8 (*)     RBC Count 2.72 (*)     Hemoglobin 8.0 (*)     Hematocrit 23.7 (*)     MCV 87      MCH 29.4      MCHC 33.8      RDW 21.1 (*)     Platelet Count 700 (*)     % Neutrophils 66      % Lymphocytes 25      % Monocytes 6      % Eosinophils 2      % Basophils 2      % Immature Granulocytes 0      NRBCs per 100 WBC 0      Absolute Neutrophils 7.8      Absolute Lymphocytes 2.9      Absolute Monocytes 0.7      Absolute Eosinophils 0.2      Absolute Basophils 0.2      Absolute Immature Granulocytes 0.0      Absolute NRBCs 0.0     RETICULOCYTE COUNT - Abnormal    % Reticulocyte 7.7 (*)     Absolute Reticulocyte 0.232 (*)    INR - Normal    INR 1.14     LIPASE - Normal    Lipase 29     TROPONIN T, HIGH SENSITIVITY - Normal    Troponin T, High Sensitivity <6     HCG QUALITATIVE PREGNANCY - Normal    hCG Serum Qualitative Negative     HCG QUANTITATIVE PREGNANCY - Normal    hCG Quantitative <1     ROUTINE UA WITH MICROSCOPIC REFLEX TO CULTURE     CT Abdomen Pelvis w/o Contrast    (Results Pending)          Critical care was not performed.     Medical Decision Making  The patient's presentation was of high complexity (a chronic illness severe exacerbation, progression, or side effect of  treatment).    The patient's evaluation involved:  ordering and/or review of 3+ test(s) in this encounter (see separate area of note for details)    The patient's management necessitated high risk (a parenteral controlled substance) and further care after sign-out to Dr. Diaz (see their note for further management).    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  Patient is afebrile with reassuring vital signs.  Nonperitoneal abdominal exam.    Chart shows a contrast allergy.  Will get a noncontrast CT scan, and the scan as an outpatient which should be continued presuming they premedicate.  Patient's labs are notable for mild leukocytosis, stable anemia.  Slightly increased reticulocyte count.  Otherwise largely reassuring.  Her epigastric pain could be related to use of NSAIDs for controlling her sickle cell disease.  Review of medication list does not appear to show she is on PPI.  Will order this.    Patient signed out to Dr. Diaz.  Urinalysis and CT scan pending.    If symptoms improved anticipate discharge with outpatient follow-up.    I have reviewed the nursing notes. I have reviewed the findings, diagnosis, plan and need for follow up with the patient.    New Prescriptions    PANTOPRAZOLE (PROTONIX) 40 MG EC TABLET    Take 1 tablet (40 mg) by mouth daily.       Final diagnoses:   Sickle cell disease with crisis (H)   Epigastric pain   IPhilip, am serving as a trained medical scribe to document services personally performed by Jesus Rhodes Jr., MD based on the provider's statements to me on October 24, 2024.  This document has  been checked and approved by the attending provider.    I, Jesus Rhodes Jr., MD, was physically present and have reviewed and verified the accuracy of this note documented by Philip Diamond, medical scribe.      Jesus Rhodes Jr., MD  [facility] EMERGENCY DEPARTMENT  10/24/2024     Jesus Rhodes MD  10/25/24 0150

## 2024-10-25 NOTE — DISCHARGE INSTRUCTIONS
Thank you for coming to the [facility]. You received treatment for sickle cell crisis and evaluation for abdominal pain.    Please continue to follow-up with your upcoming appointments and outpatient workup.  We would recommend that you start taking a daily antacid medication.    If your symptoms worsen before you follow-up in clinic please feel free to return anytime.

## 2024-10-25 NOTE — ED TRIAGE NOTES
Patient claims she was in the ED 2 days ago when she went home pain was better but the next days it came back again.      Triage Assessment (Adult)       Row Name 10/24/24 0903          Triage Assessment    Airway WDL WDL        Respiratory WDL    Respiratory WDL WDL        Skin Circulation/Temperature WDL    Skin Circulation/Temperature WDL WDL        Cardiac WDL    Cardiac WDL WDL        Peripheral/Neurovascular WDL    Peripheral Neurovascular WDL pulse assessment        Cognitive/Neuro/Behavioral WDL    Cognitive/Neuro/Behavioral WDL WDL

## 2024-10-27 ENCOUNTER — HOSPITAL ENCOUNTER (EMERGENCY)
Facility: CLINIC | Age: 24
Discharge: HOME | End: 2024-10-27
Attending: EMERGENCY MEDICINE | Admitting: EMERGENCY MEDICINE
Payer: COMMERCIAL

## 2024-10-27 VITALS
TEMPERATURE: 97.7 F | DIASTOLIC BLOOD PRESSURE: 68 MMHG | RESPIRATION RATE: 18 BRPM | HEART RATE: 103 BPM | OXYGEN SATURATION: 97 % | SYSTOLIC BLOOD PRESSURE: 112 MMHG

## 2024-10-27 DIAGNOSIS — D57.00 SICKLE CELL DISEASE WITH CRISIS (H): ICD-10-CM

## 2024-10-27 LAB
ANION GAP SERPL CALCULATED.3IONS-SCNC: 10 MMOL/L (ref 7–15)
BASOPHILS # BLD AUTO: 0.2 10E3/UL (ref 0–0.2)
BASOPHILS NFR BLD AUTO: 2 %
BUN SERPL-MCNC: 8.1 MG/DL (ref 6–20)
CALCIUM SERPL-MCNC: 9 MG/DL (ref 8.8–10.4)
CHLORIDE SERPL-SCNC: 106 MMOL/L (ref 98–107)
CREAT SERPL-MCNC: 0.53 MG/DL (ref 0.51–0.95)
DACRYOCYTES BLD QL SMEAR: SLIGHT
EGFRCR SERPLBLD CKD-EPI 2021: >90 ML/MIN/1.73M2
EOSINOPHIL # BLD AUTO: 0.4 10E3/UL (ref 0–0.7)
EOSINOPHIL NFR BLD AUTO: 4 %
ERYTHROCYTE [DISTWIDTH] IN BLOOD BY AUTOMATED COUNT: 21 % (ref 10–15)
GLUCOSE SERPL-MCNC: 94 MG/DL (ref 70–99)
HCO3 SERPL-SCNC: 25 MMOL/L (ref 22–29)
HCT VFR BLD AUTO: 24.5 % (ref 35–47)
HGB BLD-MCNC: 8.2 G/DL (ref 11.7–15.7)
IMM GRANULOCYTES # BLD: 0.1 10E3/UL
IMM GRANULOCYTES NFR BLD: 1 %
LYMPHOCYTES # BLD AUTO: 2.4 10E3/UL (ref 0.8–5.3)
LYMPHOCYTES NFR BLD AUTO: 24 %
MCH RBC QN AUTO: 27.9 PG (ref 26.5–33)
MCHC RBC AUTO-ENTMCNC: 33.5 G/DL (ref 31.5–36.5)
MCV RBC AUTO: 83 FL (ref 78–100)
MONOCYTES # BLD AUTO: 0.7 10E3/UL (ref 0–1.3)
MONOCYTES NFR BLD AUTO: 8 %
NEUTROPHILS # BLD AUTO: 6 10E3/UL (ref 1.6–8.3)
NEUTROPHILS NFR BLD AUTO: 61 %
NRBC # BLD AUTO: 0 10E3/UL
NRBC BLD AUTO-RTO: 0 /100
PLAT MORPH BLD: ABNORMAL
PLATELET # BLD AUTO: 711 10E3/UL (ref 150–450)
POTASSIUM SERPL-SCNC: 4 MMOL/L (ref 3.4–5.3)
RBC # BLD AUTO: 2.94 10E6/UL (ref 3.8–5.2)
RBC MORPH BLD: ABNORMAL
RETICS # AUTO: 0.16 10E6/UL (ref 0.03–0.1)
RETICS/RBC NFR AUTO: 5.4 % (ref 0.5–2)
SICKLE CELLS BLD QL SMEAR: SLIGHT
SODIUM SERPL-SCNC: 141 MMOL/L (ref 135–145)
STOMATOCYTES BLD QL SMEAR: SLIGHT
TARGETS BLD QL SMEAR: SLIGHT
WBC # BLD AUTO: 9.8 10E3/UL (ref 4–11)

## 2024-10-27 PROCEDURE — 85045 AUTOMATED RETICULOCYTE COUNT: CPT | Performed by: EMERGENCY MEDICINE

## 2024-10-27 PROCEDURE — 250N000011 HC RX IP 250 OP 636: Performed by: EMERGENCY MEDICINE

## 2024-10-27 PROCEDURE — 36415 COLL VENOUS BLD VENIPUNCTURE: CPT | Performed by: EMERGENCY MEDICINE

## 2024-10-27 PROCEDURE — 96361 HYDRATE IV INFUSION ADD-ON: CPT | Performed by: EMERGENCY MEDICINE

## 2024-10-27 PROCEDURE — 258N000003 HC RX IP 258 OP 636: Performed by: EMERGENCY MEDICINE

## 2024-10-27 PROCEDURE — 99284 EMERGENCY DEPT VISIT MOD MDM: CPT | Mod: 25 | Performed by: EMERGENCY MEDICINE

## 2024-10-27 PROCEDURE — 80048 BASIC METABOLIC PNL TOTAL CA: CPT | Performed by: EMERGENCY MEDICINE

## 2024-10-27 PROCEDURE — 85004 AUTOMATED DIFF WBC COUNT: CPT | Performed by: EMERGENCY MEDICINE

## 2024-10-27 PROCEDURE — 96375 TX/PRO/DX INJ NEW DRUG ADDON: CPT | Performed by: EMERGENCY MEDICINE

## 2024-10-27 PROCEDURE — 96374 THER/PROPH/DIAG INJ IV PUSH: CPT | Performed by: EMERGENCY MEDICINE

## 2024-10-27 PROCEDURE — 96376 TX/PRO/DX INJ SAME DRUG ADON: CPT | Performed by: EMERGENCY MEDICINE

## 2024-10-27 PROCEDURE — 99285 EMERGENCY DEPT VISIT HI MDM: CPT | Performed by: EMERGENCY MEDICINE

## 2024-10-27 RX ORDER — SODIUM CHLORIDE, SODIUM LACTATE, POTASSIUM CHLORIDE, CALCIUM CHLORIDE 600; 310; 30; 20 MG/100ML; MG/100ML; MG/100ML; MG/100ML
INJECTION, SOLUTION INTRAVENOUS CONTINUOUS
Status: DISCONTINUED | OUTPATIENT
Start: 2024-10-27 | End: 2024-10-27 | Stop reason: HOSPADM

## 2024-10-27 RX ORDER — ONDANSETRON 2 MG/ML
8 INJECTION INTRAMUSCULAR; INTRAVENOUS ONCE
Status: COMPLETED | OUTPATIENT
Start: 2024-10-27 | End: 2024-10-27

## 2024-10-27 RX ORDER — HEPARIN SODIUM (PORCINE) LOCK FLUSH IV SOLN 100 UNIT/ML 100 UNIT/ML
5-10 SOLUTION INTRAVENOUS
Status: DISCONTINUED | OUTPATIENT
Start: 2024-10-27 | End: 2024-10-27 | Stop reason: HOSPADM

## 2024-10-27 RX ORDER — KETOROLAC TROMETHAMINE 30 MG/ML
30 INJECTION, SOLUTION INTRAMUSCULAR; INTRAVENOUS ONCE
Status: COMPLETED | OUTPATIENT
Start: 2024-10-27 | End: 2024-10-27

## 2024-10-27 RX ADMIN — HYDROMORPHONE HYDROCHLORIDE 1 MG: 1 INJECTION, SOLUTION INTRAMUSCULAR; INTRAVENOUS; SUBCUTANEOUS at 05:53

## 2024-10-27 RX ADMIN — HYDROMORPHONE HYDROCHLORIDE 1 MG: 1 INJECTION, SOLUTION INTRAMUSCULAR; INTRAVENOUS; SUBCUTANEOUS at 03:42

## 2024-10-27 RX ADMIN — ONDANSETRON 8 MG: 2 INJECTION INTRAMUSCULAR; INTRAVENOUS at 03:50

## 2024-10-27 RX ADMIN — KETOROLAC TROMETHAMINE 30 MG: 30 INJECTION, SOLUTION INTRAMUSCULAR at 03:47

## 2024-10-27 RX ADMIN — HYDROMORPHONE HYDROCHLORIDE 1 MG: 1 INJECTION, SOLUTION INTRAMUSCULAR; INTRAVENOUS; SUBCUTANEOUS at 04:45

## 2024-10-27 RX ADMIN — SODIUM CHLORIDE, POTASSIUM CHLORIDE, SODIUM LACTATE AND CALCIUM CHLORIDE: 600; 310; 30; 20 INJECTION, SOLUTION INTRAVENOUS at 03:41

## 2024-10-27 RX ADMIN — HEPARIN 5 ML: 100 SYRINGE at 06:42

## 2024-10-27 ASSESSMENT — ACTIVITIES OF DAILY LIVING (ADL)
ADLS_ACUITY_SCORE: 0

## 2024-10-27 ASSESSMENT — ENCOUNTER SYMPTOMS: SICKLE CELL PAIN: 1

## 2024-10-27 NOTE — ED TRIAGE NOTES
Pt stated to have been here x3 this week. Generalized pain, sharp abdominal pain, shooting through the stomach. Pt had emesis x3 before arrival.      Triage Assessment (Adult)       Row Name 10/27/24 0049          Triage Assessment    Airway WDL WDL        Respiratory WDL    Respiratory WDL WDL        Skin Circulation/Temperature WDL    Skin Circulation/Temperature WDL WDL        Cardiac WDL    Cardiac WDL WDL        Peripheral/Neurovascular WDL    Peripheral Neurovascular WDL WDL        Cognitive/Neuro/Behavioral WDL    Cognitive/Neuro/Behavioral WDL WDL

## 2024-10-27 NOTE — DISCHARGE INSTRUCTIONS
Take your home medication regimen as prescribed. Follow up with your hematologist by phone on Monday and get seen by them in the next week.

## 2024-10-27 NOTE — ED PROVIDER NOTES
[facility] EMERGENCY DEPARTMENT ([facility])    10/27/24      ED PROVIDER NOTE       History     Chief Complaint   Patient presents with    Sickle Cell Pain Crisis     Pt stated to have been here x3 this week. Generalized pain, sharp abdominal pain, shooting through the stomach. Pt had emesis x3 before arrival.        Sickle Cell Pain Crisis    Jennifer Cervantes is a 24 year old female with history of sickle cell anemia, asthma, prior cerebral infarction, prior PE, who presents to the ED with complaints of sickle cell pain. She has pain throughout her body but mostly in her lower back. She denies any CP/SOB/cough/F/C.    Past Medical History  Past Medical History:   Diagnosis Date    Anxiety     Bleeding disorder (H)     Blood clotting disorder (H)     Cerebral infarction (H) 2015    Cognitive developmental delay     low IQ. 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 side (H)     right hand contractures    Iron overload due to repeated red blood cell transfusions     Migraines     Multiple subsegmental pulmonary emboli without acute cor pulmonale (H) 02/01/2021    Oppositional defiant behavior     Presence of intrauterine contraceptive device 2/18/2020    Superficial venous thrombosis of arm, right 03/25/2021    Uncomplicated asthma      Past Surgical History:   Procedure Laterality Date    AS INSERT TUNNELED CV 2 CATH W/O PORT/PUMP      CHOLECYSTECTOMY      CV RIGHT HEART CATH MEASUREMENTS RECORDED N/A 11/18/2021    Procedure: Right Heart Cath;  Surgeon: Jackosn Stauffer MD;  Location:  HEART CARDIAC CATH LAB    INSERT PORT VASCULAR ACCESS Left 4/21/2021    Procedure: INSERTION, VASCULAR ACCESS PORT (NOT SURE ON SIDE UNTIL REMOVAL);  Surgeon: Rajan More MD;  Location: [facility] OR    IR CHEST PORT PLACEMENT > 5 YRS OF AGE  4/21/2021    IR CVC NON TUNNEL LINE REMOVAL  5/6/2021    IR CVC  NON TUNNEL PLACEMENT > 5 YRS  04/07/2020    IR CVC NON TUNNEL PLACEMENT > 5 YRS  4/30/2021    IR CVC NON TUNNEL PLACEMENT > 5 YRS  9/7/2022    IR PORT REMOVAL LEFT  4/21/2021    REMOVE PORT VASCULAR ACCESS Left 4/21/2021    Procedure: REMOVAL, VASCULAR ACCESS PORT LEFT;  Surgeon: Rajan More MD;  Location: [facility] OR    REPAIR TENDON ELBOW Right 10/02/2019    Procedure: Right Elbow Flexor Lengthening, Flexor Pronator Slide Of Wrist and Finger, Thumb Adductor Lengthening;  Surgeon: Anai Franco MD;  Location: [facility] OR    TONSILLECTOMY Bilateral 10/02/2019    Procedure: Bilateral Tonsillectomy;  Surgeon: Farhana Guy MD;  Location: [facility] OR    ZZC BREAST REDUCTION (INCLUDES LIPO) TIER 3 Bilateral 04/23/2019     albuterol (PROVENTIL) (2.5 MG/3ML) 0.083% neb solution  aspirin (ASA) 81 MG chewable tablet  budesonide-formoterol (SYMBICORT) 160-4.5 MCG/ACT Inhaler  deferasirox (JADENU) 360 MG tablet  Deferiprone, Twice Daily, 1000 MG TABS  diphenhydrAMINE (BENADRYL) 50 MG capsule  EPINEPHrine (ANY BX GENERIC EQUIV) 0.3 MG/0.3ML injection 2-pack  gabapentin (NEURONTIN) 300 MG capsule  hydroxyurea (HYDREA) 500 MG capsule  ibuprofen (ADVIL/MOTRIN) 800 MG tablet  melatonin 5 MG tablet  methocarbamol (ROBAXIN) 750 MG tablet  methylPREDNISolone (MEDROL) 32 MG tablet  naloxone (NARCAN) 4 MG/0.1ML nasal spray  ondansetron (ZOFRAN ODT) 8 MG ODT tab  oxyCODONE IR (ROXICODONE) 10 MG tablet  pantoprazole (PROTONIX) 40 MG EC tablet      Allergies   Allergen Reactions    Contrast Dye Angioedema     Hives and breathing issues    Fish-Derived Products Hives    Seafood Hives    Adhesive Tape Hives     Primipore dressing causes hives    Gadolinium     Iodinated Contrast Media      Family History  Family History   Problem Relation Age of Onset    Sickle Cell Trait Mother     Hypertension Mother     Asthma Mother     Sickle Cell Trait Father     Glaucoma No family hx of     Macular Degeneration No family hx of     Diabetes  No family hx of     Gout No family hx of      Social History   Social History     Tobacco Use    Smoking status: Never     Passive exposure: Never    Smokeless tobacco: Never   Substance Use Topics    Alcohol use: Not Currently     Alcohol/week: 0.0 standard drinks of alcohol    Drug use: Never      Past medical history, past surgical history, medications, allergies, family history, and social history were reviewed with the patient. No additional pertinent items.   A medically appropriate review of systems was performed with pertinent positives and negatives noted in the HPI, and all other systems negative.    Physical Exam   BP: 109/69  Pulse: 103  Temp: 98.5  F (36.9  C)  Resp: 18  SpO2: 98 %  Physical Exam  Vitals and nursing note reviewed.   Constitutional:       General: She is not in acute distress.     Appearance: Normal appearance. She is not diaphoretic.   HENT:      Head: Atraumatic.      Mouth/Throat:      Mouth: Mucous membranes are moist.   Eyes:      General: No scleral icterus.     Conjunctiva/sclera: Conjunctivae normal.   Cardiovascular:      Rate and Rhythm: Normal rate and regular rhythm.      Heart sounds: Normal heart sounds.   Pulmonary:      Effort: No respiratory distress.      Breath sounds: Normal breath sounds.   Abdominal:      General: Abdomen is flat.      Tenderness: There is no abdominal tenderness.   Musculoskeletal:      Cervical back: Neck supple.   Skin:     General: Skin is warm.      Findings: No rash.   Neurological:      General: No focal deficit present.      Mental Status: She is alert and oriented to person, place, and time.      Motor: No weakness.   Psychiatric:         Mood and Affect: Mood normal.         Behavior: Behavior normal.           ED Course, Procedures, & Data      Procedures            Results for orders placed or performed during the hospital encounter of 10/27/24   Basic metabolic panel     Status: Normal   Result Value Ref Range    Sodium 141 135 - 145  mmol/L    Potassium 4.0 3.4 - 5.3 mmol/L    Chloride 106 98 - 107 mmol/L    Carbon Dioxide (CO2) 25 22 - 29 mmol/L    Anion Gap 10 7 - 15 mmol/L    Urea Nitrogen 8.1 6.0 - 20.0 mg/dL    Creatinine 0.53 0.51 - 0.95 mg/dL    GFR Estimate >90 >60 mL/min/1.73m2    Calcium 9.0 8.8 - 10.4 mg/dL    Glucose 94 70 - 99 mg/dL   Reticulocyte count     Status: Abnormal   Result Value Ref Range    % Reticulocyte 5.4 (H) 0.5 - 2.0 %    Absolute Reticulocyte 0.156 (H) 0.025 - 0.095 10e6/uL   CBC with platelets and differential     Status: Abnormal   Result Value Ref Range    WBC Count 9.8 4.0 - 11.0 10e3/uL    RBC Count 2.94 (L) 3.80 - 5.20 10e6/uL    Hemoglobin 8.2 (L) 11.7 - 15.7 g/dL    Hematocrit 24.5 (L) 35.0 - 47.0 %    MCV 83 78 - 100 fL    MCH 27.9 26.5 - 33.0 pg    MCHC 33.5 31.5 - 36.5 g/dL    RDW 21.0 (H) 10.0 - 15.0 %    Platelet Count 711 (H) 150 - 450 10e3/uL    % Neutrophils 61 %    % Lymphocytes 24 %    % Monocytes 8 %    % Eosinophils 4 %    % Basophils 2 %    % Immature Granulocytes 1 %    NRBCs per 100 WBC 0 <1 /100    Absolute Neutrophils 6.0 1.6 - 8.3 10e3/uL    Absolute Lymphocytes 2.4 0.8 - 5.3 10e3/uL    Absolute Monocytes 0.7 0.0 - 1.3 10e3/uL    Absolute Eosinophils 0.4 0.0 - 0.7 10e3/uL    Absolute Basophils 0.2 0.0 - 0.2 10e3/uL    Absolute Immature Granulocytes 0.1 <=0.4 10e3/uL    Absolute NRBCs 0.0 10e3/uL   RBC and Platelet Morphology     Status: Abnormal   Result Value Ref Range    RBC Morphology Confirmed RBC Indices     Platelet Assessment  Automated Count Confirmed. Platelet morphology is normal.     Automated Count Confirmed. Platelet morphology is normal.    Sickle Cells Slight (A) None Seen    Stomatocytes Slight (A) None Seen    Target Cells Slight (A) None Seen    Teardrop Cells Slight (A) None Seen   CBC with platelets differential     Status: Abnormal    Narrative    The following orders were created for panel order CBC with platelets differential.  Procedure                                Abnormality         Status                     ---------                               -----------         ------                     CBC with platelets and d...[[id number]]  Abnormal            Final result               RBC and Platelet Morphology[[id number]]  Abnormal            Final result                 Please view results for these tests on the individual orders.     Medications   HYDROmorphone (DILAUDID) injection 1 mg (1 mg Intravenous $Given 10/27/24 9538)   ketorolac (TORADOL) injection 30 mg (30 mg Intravenous $Given 10/27/24 8260)   ondansetron (ZOFRAN) injection 8 mg (8 mg Intravenous $Given 10/27/24 4088)     Labs Ordered and Resulted from Time of ED Arrival to Time of ED Departure   RETICULOCYTE COUNT - Abnormal       Result Value    % Reticulocyte 5.4 (*)     Absolute Reticulocyte 0.156 (*)    CBC WITH PLATELETS AND DIFFERENTIAL - Abnormal    WBC Count 9.8      RBC Count 2.94 (*)     Hemoglobin 8.2 (*)     Hematocrit 24.5 (*)     MCV 83      MCH 27.9      MCHC 33.5      RDW 21.0 (*)     Platelet Count 711 (*)     % Neutrophils 61      % Lymphocytes 24      % Monocytes 8      % Eosinophils 4      % Basophils 2      % Immature Granulocytes 1      NRBCs per 100 WBC 0      Absolute Neutrophils 6.0      Absolute Lymphocytes 2.4      Absolute Monocytes 0.7      Absolute Eosinophils 0.4      Absolute Basophils 0.2      Absolute Immature Granulocytes 0.1      Absolute NRBCs 0.0     RBC AND PLATELET MORPHOLOGY - Abnormal    RBC Morphology Confirmed RBC Indices      Platelet Assessment        Value: Automated Count Confirmed. Platelet morphology is normal.    Sickle Cells Slight (*)     Stomatocytes Slight (*)     Target Cells Slight (*)     Teardrop Cells Slight (*)    BASIC METABOLIC PANEL - Normal    Sodium 141      Potassium 4.0      Chloride 106      Carbon Dioxide (CO2) 25      Anion Gap 10      Urea Nitrogen 8.1      Creatinine 0.53      GFR Estimate >90      Calcium 9.0      Glucose 94       No orders  to display          Critical care was not performed.     Medical Decision Making  The patient's presentation was of high complexity (a chronic illness severe exacerbation, progression, or side effect of treatment).    The patient's evaluation involved:  ordering and/or review of 3+ test(s) in this encounter (see separate area of note for details)    The patient's management necessitated high risk (a parenteral controlled substance).    Assessment & Plan    25 yo female here with low back and whole body pain secondary to sickle cell pain crisis. Labs and meds ordered according to her care plan. She denies any chest symptoms.    Labs show Hgb 8.2, which is her baseline, and %retic 5.4, otherwise unremarkable. Her pain improved after utilizing her care plan meds and she was discharged to home with follow up with her PCP and hematologist in the next week.    I have reviewed the nursing notes. I have reviewed the findings, diagnosis, plan and need for follow up with the patient.    Discharge Medication List as of 10/27/2024  6:49 AM          Final diagnoses:   Sickle cell disease with crisis (H)       Jitendra Perry MD  [facility] EMERGENCY DEPARTMENT  10/27/2024     Jitendra Perry MD  10/27/24 0794

## 2024-10-28 ENCOUNTER — INFUSION THERAPY VISIT (OUTPATIENT)
Dept: INFUSION THERAPY | Facility: CLINIC | Age: 24
End: 2024-10-28
Attending: PEDIATRICS
Payer: COMMERCIAL

## 2024-10-28 ENCOUNTER — HOSPITAL ENCOUNTER (EMERGENCY)
Facility: CLINIC | Age: 24
Discharge: HOME | End: 2024-10-29
Attending: EMERGENCY MEDICINE | Admitting: EMERGENCY MEDICINE
Payer: COMMERCIAL

## 2024-10-28 ENCOUNTER — NURSE TRIAGE (OUTPATIENT)
Dept: ONCOLOGY | Facility: CLINIC | Age: 24
End: 2024-10-28
Payer: COMMERCIAL

## 2024-10-28 VITALS
SYSTOLIC BLOOD PRESSURE: 127 MMHG | OXYGEN SATURATION: 93 % | DIASTOLIC BLOOD PRESSURE: 69 MMHG | TEMPERATURE: 98 F | RESPIRATION RATE: 16 BRPM | HEART RATE: 68 BPM

## 2024-10-28 DIAGNOSIS — D57.00 SICKLE CELL PAIN CRISIS (H): Primary | ICD-10-CM

## 2024-10-28 DIAGNOSIS — D57.00 SICKLE CELL PAIN CRISIS (H): ICD-10-CM

## 2024-10-28 DIAGNOSIS — G81.10 SPASTIC HEMIPLEGIA, UNSPECIFIED ETIOLOGY, UNSPECIFIED LATERALITY (H): ICD-10-CM

## 2024-10-28 PROCEDURE — 96361 HYDRATE IV INFUSION ADD-ON: CPT

## 2024-10-28 PROCEDURE — 99284 EMERGENCY DEPT VISIT MOD MDM: CPT | Mod: 25 | Performed by: EMERGENCY MEDICINE

## 2024-10-28 PROCEDURE — 258N000003 HC RX IP 258 OP 636: Performed by: PEDIATRICS

## 2024-10-28 PROCEDURE — 96374 THER/PROPH/DIAG INJ IV PUSH: CPT

## 2024-10-28 PROCEDURE — 96375 TX/PRO/DX INJ NEW DRUG ADDON: CPT

## 2024-10-28 PROCEDURE — 99284 EMERGENCY DEPT VISIT MOD MDM: CPT | Performed by: EMERGENCY MEDICINE

## 2024-10-28 PROCEDURE — 250N000011 HC RX IP 250 OP 636: Performed by: PEDIATRICS

## 2024-10-28 PROCEDURE — 96376 TX/PRO/DX INJ SAME DRUG ADON: CPT

## 2024-10-28 PROCEDURE — 250N000013 HC RX MED GY IP 250 OP 250 PS 637: Performed by: PEDIATRICS

## 2024-10-28 RX ORDER — HEPARIN SODIUM (PORCINE) LOCK FLUSH IV SOLN 100 UNIT/ML 100 UNIT/ML
5 SOLUTION INTRAVENOUS
Status: DISCONTINUED | OUTPATIENT
Start: 2024-10-28 | End: 2024-10-28 | Stop reason: HOSPADM

## 2024-10-28 RX ORDER — DIPHENHYDRAMINE HCL 25 MG
50 CAPSULE ORAL
Status: CANCELLED
Start: 2025-01-01

## 2024-10-28 RX ORDER — ONDANSETRON 2 MG/ML
8 INJECTION INTRAMUSCULAR; INTRAVENOUS EVERY 6 HOURS PRN
Status: DISCONTINUED | OUTPATIENT
Start: 2024-10-28 | End: 2024-10-28 | Stop reason: HOSPADM

## 2024-10-28 RX ORDER — ONDANSETRON 2 MG/ML
8 INJECTION INTRAMUSCULAR; INTRAVENOUS EVERY 6 HOURS PRN
Status: CANCELLED
Start: 2025-01-01

## 2024-10-28 RX ORDER — ONDANSETRON 8 MG/1
8 TABLET, ORALLY DISINTEGRATING ORAL
Status: DISCONTINUED | OUTPATIENT
Start: 2024-10-28 | End: 2024-10-28 | Stop reason: HOSPADM

## 2024-10-28 RX ORDER — DIPHENHYDRAMINE HCL 25 MG
50 CAPSULE ORAL
Status: COMPLETED | OUTPATIENT
Start: 2024-10-28 | End: 2024-10-28

## 2024-10-28 RX ORDER — KETOROLAC TROMETHAMINE 30 MG/ML
30 INJECTION, SOLUTION INTRAMUSCULAR; INTRAVENOUS ONCE
Status: COMPLETED | OUTPATIENT
Start: 2024-10-28 | End: 2024-10-28

## 2024-10-28 RX ORDER — OXYCODONE HYDROCHLORIDE 10 MG/1
10 TABLET ORAL EVERY 6 HOURS PRN
Qty: 10 TABLET | Refills: 0 | Status: SHIPPED | OUTPATIENT
Start: 2024-10-28 | End: 2024-11-04

## 2024-10-28 RX ORDER — ONDANSETRON 4 MG/1
8 TABLET, FILM COATED ORAL
Status: CANCELLED
Start: 2025-01-01

## 2024-10-28 RX ORDER — HEPARIN SODIUM (PORCINE) LOCK FLUSH IV SOLN 100 UNIT/ML 100 UNIT/ML
5 SOLUTION INTRAVENOUS
Status: CANCELLED | OUTPATIENT
Start: 2025-01-01

## 2024-10-28 RX ORDER — KETOROLAC TROMETHAMINE 30 MG/ML
30 INJECTION, SOLUTION INTRAMUSCULAR; INTRAVENOUS ONCE
Status: CANCELLED
Start: 2025-01-01 | End: 2025-01-01

## 2024-10-28 RX ORDER — HEPARIN SODIUM,PORCINE 10 UNIT/ML
5 VIAL (ML) INTRAVENOUS
Status: CANCELLED | OUTPATIENT
Start: 2025-01-01

## 2024-10-28 RX ADMIN — HYDROMORPHONE HYDROCHLORIDE 1 MG: 1 INJECTION, SOLUTION INTRAMUSCULAR; INTRAVENOUS; SUBCUTANEOUS at 10:39

## 2024-10-28 RX ADMIN — ONDANSETRON 8 MG: 2 INJECTION INTRAMUSCULAR; INTRAVENOUS at 08:39

## 2024-10-28 RX ADMIN — HYDROMORPHONE HYDROCHLORIDE 1 MG: 1 INJECTION, SOLUTION INTRAMUSCULAR; INTRAVENOUS; SUBCUTANEOUS at 09:40

## 2024-10-28 RX ADMIN — Medication 5 ML: at 11:02

## 2024-10-28 RX ADMIN — DIPHENHYDRAMINE HYDROCHLORIDE 50 MG: 25 CAPSULE ORAL at 08:38

## 2024-10-28 RX ADMIN — SODIUM CHLORIDE, POTASSIUM CHLORIDE, SODIUM LACTATE AND CALCIUM CHLORIDE 1000 ML: 600; 310; 30; 20 INJECTION, SOLUTION INTRAVENOUS at 08:36

## 2024-10-28 RX ADMIN — HYDROMORPHONE HYDROCHLORIDE 1 MG: 1 INJECTION, SOLUTION INTRAMUSCULAR; INTRAVENOUS; SUBCUTANEOUS at 08:39

## 2024-10-28 RX ADMIN — KETOROLAC TROMETHAMINE 30 MG: 30 INJECTION, SOLUTION INTRAMUSCULAR; INTRAVENOUS at 08:39

## 2024-10-28 NOTE — TELEPHONE ENCOUNTER
Narcotic Refill Request    Medication(s) requested:  Oxycodone  Person Requesting Refill: Sierra  What pain is the medication treating: Sickle cell pain  How is the medication being taken?:1 tablet every 6 hrs  Does pt have enough for today? Will run out today  Is pain being adequately controlled on the current regimen?: Yes  Experiencing any side effects from medication?: No    Date of most recent appointment:  10/21/24 w/  Any No Show Visits: No  Next appointment:   10/31/24 w/Patricia Mantilla  Last fill date and by whom:  10/21/24 by    Reviewed: No access    Routed/Paged provider:

## 2024-10-28 NOTE — TELEPHONE ENCOUNTER
Oncology Nurse Triage - Sickle Cell Pain Crisis:    Situation: Jennifer  calling about Sickle Cell Pain Crisis, requesting to be added on for IV fluids and pain medicine    Background:     Patient's last infusion was 10/27/24, was in the ER.  Last clinic visit date:10/21/24  Does patient have active treatment plan?  Yes      Assessment of Symptoms:  Onset/Duration of symptoms: 2 day    Is it typical sickle cell pain? Yes   Location: All over  Character: Sharp           Intensity: 9/10    Any radiation of pain, numbness, tingling, weakness, warmth, swelling, discoloration of arms or legs?No     Fever?No  (if yes max temperature recorded in last 24 hours):      Chest Pain Present: No     Shortness of breath: No     Other home therapies tried: HEAT/HEATING PAD and WARM BATH     Last home medication taken and when: 0600 ibuprofen and oxycodone    Any Refills Needed?: Yes, oxycodone    Does patient have transportation & length of time to get to clinic: Can try to find transportation if something opens up early, otherwise, will need assistance.        Recommendations:   If you do not hear from the infusion center by 2pm then you will not be able to get in for an infusion today. If symptoms worsen while waiting for call back, and/or you experience fever, chills, SOB, chest pain, cough, n/v, dizziness, numbness, swelling, discoloration of extremities, then seek emergency evaluation in Emergency Department.       0737 Fredo page to     0711 Approved by     0718 Available appt with BMT at 0745. Pt stating she is able to make appt. Message sent to [facility] to assist with scheduling.

## 2024-10-28 NOTE — PATIENT INSTRUCTIONS
Dear Jennifer Cervantes    Thank you for choosing [facility] ([facility]) for your infusion.  The following information is a summary of our appointment as well as important reminders.      We look forward in seeing you on your next appointment here at [facility] ([facility]).  Please don t hesitate to call us at [phone] to reschedule any of your appointments or to speak with one of the [facility] registered nurses.  It was a pleasure taking care of you today.    Sincerely,    [facility]  [address] [place] [address]  [address]  Phone:  [phone]

## 2024-10-28 NOTE — PROGRESS NOTES
Infusion Nursing Note:  Jennifer Cervantes presents today for IV fluids, pain medication, antiemetics.    Patient seen by provider today: No   present during visit today: Not Applicable.    Note:   Generalized pain on arrival 9/10, pain medication given as ordered. Pain upon discharge 6/10, tolerable per patient.  1L LR over 2 hours.  IV dilaudid x3.  IV Toradol.  IV Zofran.  PO benadryl.    Intravenous Access:  Implanted Port.    Treatment Conditions:  Not Applicable.    Post Infusion Assessment:  Patient tolerated infusion without incident.  Blood return noted pre and post infusion.  Site patent and intact, free from redness, edema or discomfort.  No evidence of extravasations.  Access discontinued per protocol.     Discharge Plan:   Patient and/or family verbalized understanding of discharge instructions and all questions answered.  AVS to patient via [employer].    Patient discharged in stable condition accompanied by: self.  Departure Mode: Ambulatory.    Administrations This Visit       diphenhydrAMINE (BENADRYL) capsule 50 mg       Admin Date  10/28/2024 Action  $Given Dose  50 mg Route  Oral Documented By  Echo Perez RN              heparin lock flush 100 unit/mL injection 5 mL       Admin Date  10/28/2024 Action  $Given Dose  5 mL Route  Intracatheter Documented By  Echo Perez RN              HYDROmorphone (DILAUDID) injection 1 mg       Admin Date  10/28/2024 Action  $Given Dose  1 mg Route  Intravenous Documented By  Echo Perez RN               Admin Date  10/28/2024 Action  $Given Dose  1 mg Route  Intravenous Documented By  Echo Preez RN               Admin Date  10/28/2024 Action  $Given Dose  1 mg Route  Intravenous Documented By  Echo Perez RN              ketorolac (TORADOL) injection 30 mg       Admin Date  10/28/2024 Action  $Given Dose  30 mg Route  Intravenous Documented By  Echo Perez RN              lactated ringers BOLUS 1,000 mL       Admin  Date  10/28/2024 Action  $New Bag Dose  1,000 mL Rate  500 mL/hr Route  Intravenous Documented By  Echo Perez RN              ondansetron (ZOFRAN) injection 8 mg       Admin Date  10/28/2024 Action  $Given Dose  8 mg Route  Intravenous Documented By  Echo Perez RN                  /69   Pulse 68   Temp 98  F (36.7  C) (Oral)   Resp 16   SpO2 93%     Echo Perez RN

## 2024-10-28 NOTE — TELEPHONE ENCOUNTER
[facility] is running late due to traffic so eta closer to 8-8:15am.    0743 Per [facility] charge nurse, okay for arrival, pt needs to be in checked in by no later than 8:15am.

## 2024-10-29 VITALS
RESPIRATION RATE: 15 BRPM | OXYGEN SATURATION: 94 % | SYSTOLIC BLOOD PRESSURE: 105 MMHG | TEMPERATURE: 98.3 F | DIASTOLIC BLOOD PRESSURE: 55 MMHG | HEART RATE: 97 BPM

## 2024-10-29 LAB
ALBUMIN SERPL BCG-MCNC: 4.2 G/DL (ref 3.5–5.2)
ALP SERPL-CCNC: 53 U/L (ref 40–150)
ALT SERPL W P-5'-P-CCNC: 19 U/L (ref 0–50)
ANION GAP SERPL CALCULATED.3IONS-SCNC: 12 MMOL/L (ref 7–15)
AST SERPL W P-5'-P-CCNC: 33 U/L (ref 0–45)
BASOPHILS # BLD AUTO: 0.2 10E3/UL (ref 0–0.2)
BASOPHILS NFR BLD AUTO: 2 %
BILIRUB SERPL-MCNC: 1.7 MG/DL
BUN SERPL-MCNC: 8 MG/DL (ref 6–20)
CALCIUM SERPL-MCNC: 8.9 MG/DL (ref 8.8–10.4)
CHLORIDE SERPL-SCNC: 103 MMOL/L (ref 98–107)
CREAT SERPL-MCNC: 0.59 MG/DL (ref 0.51–0.95)
EGFRCR SERPLBLD CKD-EPI 2021: >90 ML/MIN/1.73M2
ELLIPTOCYTES BLD QL SMEAR: SLIGHT
EOSINOPHIL # BLD AUTO: 0.7 10E3/UL (ref 0–0.7)
EOSINOPHIL NFR BLD AUTO: 6 %
ERYTHROCYTE [DISTWIDTH] IN BLOOD BY AUTOMATED COUNT: 22.4 % (ref 10–15)
GLUCOSE SERPL-MCNC: 88 MG/DL (ref 70–99)
HCO3 SERPL-SCNC: 23 MMOL/L (ref 22–29)
HCT VFR BLD AUTO: 21 % (ref 35–47)
HGB BLD-MCNC: 7 G/DL (ref 11.7–15.7)
IMM GRANULOCYTES # BLD: 0 10E3/UL
IMM GRANULOCYTES NFR BLD: 0 %
LYMPHOCYTES # BLD AUTO: 2.5 10E3/UL (ref 0.8–5.3)
LYMPHOCYTES NFR BLD AUTO: 22 %
MCH RBC QN AUTO: 28.1 PG (ref 26.5–33)
MCHC RBC AUTO-ENTMCNC: 33.3 G/DL (ref 31.5–36.5)
MCV RBC AUTO: 84 FL (ref 78–100)
MONOCYTES # BLD AUTO: 0.8 10E3/UL (ref 0–1.3)
MONOCYTES NFR BLD AUTO: 7 %
NEUTROPHILS # BLD AUTO: 7.1 10E3/UL (ref 1.6–8.3)
NEUTROPHILS NFR BLD AUTO: 63 %
NRBC # BLD AUTO: 0 10E3/UL
NRBC BLD AUTO-RTO: 0 /100
PLAT MORPH BLD: ABNORMAL
PLATELET # BLD AUTO: 619 10E3/UL (ref 150–450)
POTASSIUM SERPL-SCNC: 3.9 MMOL/L (ref 3.4–5.3)
PROT SERPL-MCNC: 7 G/DL (ref 6.4–8.3)
RBC # BLD AUTO: 2.49 10E6/UL (ref 3.8–5.2)
RBC MORPH BLD: ABNORMAL
RETICS # AUTO: 0.18 10E6/UL (ref 0.03–0.1)
RETICS/RBC NFR AUTO: 7.3 % (ref 0.5–2)
SICKLE CELLS BLD QL SMEAR: SLIGHT
SODIUM SERPL-SCNC: 138 MMOL/L (ref 135–145)
WBC # BLD AUTO: 11.4 10E3/UL (ref 4–11)

## 2024-10-29 PROCEDURE — 36591 DRAW BLOOD OFF VENOUS DEVICE: CPT | Performed by: EMERGENCY MEDICINE

## 2024-10-29 PROCEDURE — 96374 THER/PROPH/DIAG INJ IV PUSH: CPT | Performed by: EMERGENCY MEDICINE

## 2024-10-29 PROCEDURE — 96375 TX/PRO/DX INJ NEW DRUG ADDON: CPT | Performed by: EMERGENCY MEDICINE

## 2024-10-29 PROCEDURE — 80053 COMPREHEN METABOLIC PANEL: CPT | Performed by: EMERGENCY MEDICINE

## 2024-10-29 PROCEDURE — 85045 AUTOMATED RETICULOCYTE COUNT: CPT | Performed by: EMERGENCY MEDICINE

## 2024-10-29 PROCEDURE — 85025 COMPLETE CBC W/AUTO DIFF WBC: CPT | Performed by: EMERGENCY MEDICINE

## 2024-10-29 PROCEDURE — 96376 TX/PRO/DX INJ SAME DRUG ADON: CPT | Performed by: EMERGENCY MEDICINE

## 2024-10-29 PROCEDURE — 250N000011 HC RX IP 250 OP 636: Performed by: EMERGENCY MEDICINE

## 2024-10-29 RX ORDER — ONDANSETRON 2 MG/ML
8 INJECTION INTRAMUSCULAR; INTRAVENOUS ONCE
Status: COMPLETED | OUTPATIENT
Start: 2024-10-29 | End: 2024-10-29

## 2024-10-29 RX ORDER — KETOROLAC TROMETHAMINE 15 MG/ML
15 INJECTION, SOLUTION INTRAMUSCULAR; INTRAVENOUS ONCE
Status: COMPLETED | OUTPATIENT
Start: 2024-10-29 | End: 2024-10-29

## 2024-10-29 RX ADMIN — HYDROMORPHONE HYDROCHLORIDE 1 MG: 1 INJECTION, SOLUTION INTRAMUSCULAR; INTRAVENOUS; SUBCUTANEOUS at 02:21

## 2024-10-29 RX ADMIN — HYDROMORPHONE HYDROCHLORIDE 1 MG: 1 INJECTION, SOLUTION INTRAMUSCULAR; INTRAVENOUS; SUBCUTANEOUS at 01:08

## 2024-10-29 RX ADMIN — HYDROMORPHONE HYDROCHLORIDE 1 MG: 1 INJECTION, SOLUTION INTRAMUSCULAR; INTRAVENOUS; SUBCUTANEOUS at 03:41

## 2024-10-29 RX ADMIN — KETOROLAC TROMETHAMINE 15 MG: 15 INJECTION, SOLUTION INTRAMUSCULAR; INTRAVENOUS at 02:39

## 2024-10-29 RX ADMIN — ONDANSETRON 8 MG: 2 INJECTION INTRAMUSCULAR; INTRAVENOUS at 02:40

## 2024-10-29 ASSESSMENT — ACTIVITIES OF DAILY LIVING (ADL)
ADLS_ACUITY_SCORE: 0

## 2024-10-29 NOTE — ED TRIAGE NOTES
Pt was helping move, feels like she overdid it. Pain 8/10, generalized. Took oxycodone 10mg 2 hrs ago     Triage Assessment (Adult)       Row Name 10/28/24 3621          Triage Assessment    Airway WDL WDL        Respiratory WDL    Respiratory WDL WDL        Skin Circulation/Temperature WDL    Skin Circulation/Temperature WDL WDL        Cardiac WDL    Cardiac WDL WDL        Peripheral/Neurovascular WDL    Peripheral Neurovascular WDL WDL        Cognitive/Neuro/Behavioral WDL    Cognitive/Neuro/Behavioral WDL WDL

## 2024-10-29 NOTE — ED PROVIDER NOTES
"    [facility] [place] ([facility])    10/28/24      ED PROVIDER NOTE       History     Chief Complaint   Patient presents with    Sickle Cell Pain Crisis     Pt was helping move, feels like she overdid it. Pain 8/10, generalized. Took oxycodone 10mg 2 hrs ago     HPI  Jennifer Cervantes is a 24 year old female with history of sickle cell anemia, asthma, prior cerebral infarction, prior PE  who presents to the emergency department for sickle cell pain crisis.  She states that the pain started yesterday.  She was helping her family move and went up and down the stairs multiple times.  She feels like she \"overdid it\".  She has pain in her back which is typical of her sickle cell pain.  Denies chest pain or shortness of breath.  She feels confident that this is consistent with her sickle cell pain.  She does note she has been taking her home oral oxycodone.  She has been trying to wean off of that and only gets 10 pills a week.    Past Medical History  Past Medical History:   Diagnosis Date    Anxiety     Bleeding disorder (H)     Blood clotting disorder (H)     Cerebral infarction (H) 2015    Cognitive developmental delay     low IQ. 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 side (H)     right hand contractures    Iron overload due to repeated red blood cell transfusions     Migraines     Multiple subsegmental pulmonary emboli without acute cor pulmonale (H) 02/01/2021    Oppositional defiant behavior     Presence of intrauterine contraceptive device 2/18/2020    Superficial venous thrombosis of arm, right 03/25/2021    Uncomplicated asthma      Past Surgical History:   Procedure Laterality Date    AS INSERT TUNNELED CV 2 CATH W/O PORT/PUMP      CHOLECYSTECTOMY      CV RIGHT HEART CATH MEASUREMENTS RECORDED N/A 11/18/2021    Procedure: Right Heart Cath;  Surgeon: Jackson Stauffer, " MD;  Location:  HEART CARDIAC CATH LAB    INSERT PORT VASCULAR ACCESS Left 4/21/2021    Procedure: INSERTION, VASCULAR ACCESS PORT (NOT SURE ON SIDE UNTIL REMOVAL);  Surgeon: Rajan More MD;  Location: [facility] OR    IR CHEST PORT PLACEMENT > 5 YRS OF AGE  4/21/2021    IR CVC NON TUNNEL LINE REMOVAL  5/6/2021    IR CVC NON TUNNEL PLACEMENT > 5 YRS  04/07/2020    IR CVC NON TUNNEL PLACEMENT > 5 YRS  4/30/2021    IR CVC NON TUNNEL PLACEMENT > 5 YRS  9/7/2022    IR PORT REMOVAL LEFT  4/21/2021    REMOVE PORT VASCULAR ACCESS Left 4/21/2021    Procedure: REMOVAL, VASCULAR ACCESS PORT LEFT;  Surgeon: Rajan More MD;  Location: [facility] OR    REPAIR TENDON ELBOW Right 10/02/2019    Procedure: Right Elbow Flexor Lengthening, Flexor Pronator Slide Of Wrist and Finger, Thumb Adductor Lengthening;  Surgeon: Anai Franco MD;  Location: [facility] OR    TONSILLECTOMY Bilateral 10/02/2019    Procedure: Bilateral Tonsillectomy;  Surgeon: Farhana Guy MD;  Location: [facility] OR    ZZC BREAST REDUCTION (INCLUDES LIPO) TIER 3 Bilateral 04/23/2019     albuterol (PROVENTIL) (2.5 MG/3ML) 0.083% neb solution  aspirin (ASA) 81 MG chewable tablet  budesonide-formoterol (SYMBICORT) 160-4.5 MCG/ACT Inhaler  deferasirox (JADENU) 360 MG tablet  Deferiprone, Twice Daily, 1000 MG TABS  diphenhydrAMINE (BENADRYL) 50 MG capsule  EPINEPHrine (ANY BX GENERIC EQUIV) 0.3 MG/0.3ML injection 2-pack  gabapentin (NEURONTIN) 300 MG capsule  hydroxyurea (HYDREA) 500 MG capsule  ibuprofen (ADVIL/MOTRIN) 800 MG tablet  melatonin 5 MG tablet  methocarbamol (ROBAXIN) 750 MG tablet  methylPREDNISolone (MEDROL) 32 MG tablet  naloxone (NARCAN) 4 MG/0.1ML nasal spray  ondansetron (ZOFRAN ODT) 8 MG ODT tab  oxyCODONE IR (ROXICODONE) 10 MG tablet  pantoprazole (PROTONIX) 40 MG EC tablet      Allergies   Allergen Reactions    Contrast Dye Angioedema     Hives and breathing issues    Fish-Derived Products Hives    Seafood Hives    Adhesive Tape Hives      Primipore dressing causes hives    Gadolinium     Iodinated Contrast Media      Family History  Family History   Problem Relation Age of Onset    Sickle Cell Trait Mother     Hypertension Mother     Asthma Mother     Sickle Cell Trait Father     Glaucoma No family hx of     Macular Degeneration No family hx of     Diabetes No family hx of     Gout No family hx of      Social History   Social History     Tobacco Use    Smoking status: Never     Passive exposure: Never    Smokeless tobacco: Never   Substance Use Topics    Alcohol use: Not Currently     Alcohol/week: 0.0 standard drinks of alcohol    Drug use: Never      A medically appropriate review of systems was performed with pertinent positives and negatives noted in the HPI, and all other systems negative.    Physical Exam   BP: 115/56  Pulse: 97  Temp: 98.2  F (36.8  C)  Resp: 16  SpO2: 97 %  Physical Exam  Vitals and nursing note reviewed.   Constitutional:       General: She is not in acute distress.     Appearance: Normal appearance.   HENT:      Head: Normocephalic.      Nose: Nose normal.   Eyes:      Pupils: Pupils are equal, round, and reactive to light.   Cardiovascular:      Rate and Rhythm: Normal rate and regular rhythm.   Pulmonary:      Effort: Pulmonary effort is normal.   Abdominal:      General: There is no distension.   Musculoskeletal:         General: No deformity. Normal range of motion.      Cervical back: Normal range of motion.      Comments: Unremarkable examination of the back.   Skin:     General: Skin is warm.   Neurological:      Mental Status: She is alert and oriented to person, place, and time.   Psychiatric:         Mood and Affect: Mood normal.           ED Course, Procedures, & Data           Results for orders placed or performed during the hospital encounter of 10/28/24   Comprehensive metabolic panel     Status: Abnormal   Result Value Ref Range    Sodium 138 135 - 145 mmol/L    Potassium 3.9 3.4 - 5.3 mmol/L    Carbon  Dioxide (CO2) 23 22 - 29 mmol/L    Anion Gap 12 7 - 15 mmol/L    Urea Nitrogen 8.0 6.0 - 20.0 mg/dL    Creatinine 0.59 0.51 - 0.95 mg/dL    GFR Estimate >90 >60 mL/min/1.73m2    Calcium 8.9 8.8 - 10.4 mg/dL    Chloride 103 98 - 107 mmol/L    Glucose 88 70 - 99 mg/dL    Alkaline Phosphatase 53 40 - 150 U/L    AST 33 0 - 45 U/L    ALT 19 0 - 50 U/L    Protein Total 7.0 6.4 - 8.3 g/dL    Albumin 4.2 3.5 - 5.2 g/dL    Bilirubin Total 1.7 (H) <=1.2 mg/dL   Reticulocyte count     Status: Abnormal   Result Value Ref Range    % Reticulocyte 7.3 (H) 0.5 - 2.0 %    Absolute Reticulocyte 0.181 (H) 0.025 - 0.095 10e6/uL   CBC with platelets and differential     Status: Abnormal   Result Value Ref Range    WBC Count 11.4 (H) 4.0 - 11.0 10e3/uL    RBC Count 2.49 (L) 3.80 - 5.20 10e6/uL    Hemoglobin 7.0 (L) 11.7 - 15.7 g/dL    Hematocrit 21.0 (L) 35.0 - 47.0 %    MCV 84 78 - 100 fL    MCH 28.1 26.5 - 33.0 pg    MCHC 33.3 31.5 - 36.5 g/dL    RDW 22.4 (H) 10.0 - 15.0 %    Platelet Count 619 (H) 150 - 450 10e3/uL    % Neutrophils 63 %    % Lymphocytes 22 %    % Monocytes 7 %    % Eosinophils 6 %    % Basophils 2 %    % Immature Granulocytes 0 %    NRBCs per 100 WBC 0 <1 /100    Absolute Neutrophils 7.1 1.6 - 8.3 10e3/uL    Absolute Lymphocytes 2.5 0.8 - 5.3 10e3/uL    Absolute Monocytes 0.8 0.0 - 1.3 10e3/uL    Absolute Eosinophils 0.7 0.0 - 0.7 10e3/uL    Absolute Basophils 0.2 0.0 - 0.2 10e3/uL    Absolute Immature Granulocytes 0.0 <=0.4 10e3/uL    Absolute NRBCs 0.0 10e3/uL   RBC and Platelet Morphology     Status: Abnormal   Result Value Ref Range    RBC Morphology Confirmed RBC Indices     Platelet Assessment  Automated Count Confirmed. Platelet morphology is normal.     Automated Count Confirmed. Platelet morphology is normal.    Elliptocytes Slight (A) None Seen    Sickle Cells Slight (A) None Seen   CBC with platelets differential     Status: Abnormal    Narrative    The following orders were created for panel order CBC with  platelets differential.  Procedure                               Abnormality         Status                     ---------                               -----------         ------                     CBC with platelets and d...[[id number]]  Abnormal            Final result               RBC and Platelet Morphology[[id number]]  Abnormal            Final result                 Please view results for these tests on the individual orders.     Medications   HYDROmorphone (DILAUDID) injection 1 mg (1 mg Intravenous $Given 10/29/24 0341)   ketorolac (TORADOL) injection 15 mg (15 mg Intravenous $Given 10/29/24 0239)   ondansetron (ZOFRAN) injection 8 mg (8 mg Intravenous $Given 10/29/24 0240)     Labs Ordered and Resulted from Time of ED Arrival to Time of ED Departure   COMPREHENSIVE METABOLIC PANEL - Abnormal       Result Value    Sodium 138      Potassium 3.9      Carbon Dioxide (CO2) 23      Anion Gap 12      Urea Nitrogen 8.0      Creatinine 0.59      GFR Estimate >90      Calcium 8.9      Chloride 103      Glucose 88      Alkaline Phosphatase 53      AST 33      ALT 19      Protein Total 7.0      Albumin 4.2      Bilirubin Total 1.7 (*)    RETICULOCYTE COUNT - Abnormal    % Reticulocyte 7.3 (*)     Absolute Reticulocyte 0.181 (*)    CBC WITH PLATELETS AND DIFFERENTIAL - Abnormal    WBC Count 11.4 (*)     RBC Count 2.49 (*)     Hemoglobin 7.0 (*)     Hematocrit 21.0 (*)     MCV 84      MCH 28.1      MCHC 33.3      RDW 22.4 (*)     Platelet Count 619 (*)     % Neutrophils 63      % Lymphocytes 22      % Monocytes 7      % Eosinophils 6      % Basophils 2      % Immature Granulocytes 0      NRBCs per 100 WBC 0      Absolute Neutrophils 7.1      Absolute Lymphocytes 2.5      Absolute Monocytes 0.8      Absolute Eosinophils 0.7      Absolute Basophils 0.2      Absolute Immature Granulocytes 0.0      Absolute NRBCs 0.0     RBC AND PLATELET MORPHOLOGY - Abnormal    RBC Morphology Confirmed RBC Indices      Platelet Assessment         Value: Automated Count Confirmed. Platelet morphology is normal.    Elliptocytes Slight (*)     Sickle Cells Slight (*)      No orders to display          Critical care was not performed.     Medical Decision Making  The patient's presentation was of moderate complexity (a chronic illness mild to moderate exacerbation, progression, or side effect of treatment).    The patient's evaluation involved:  ordering and/or review of 3+ test(s) in this encounter (see separate area of note for details)    The patient's management necessitated high risk (a parenteral controlled substance).    Assessment & Plan    Patient presents the ED for evaluation of back pain which she feels is similar to her sickle cell pain crisis.  On arrival, patient is normal vital signs.  Appears well and nontoxic.  She is in no respiratory distress.  No evidence of acute chest syndrome on exam or history.  No red flags of back pain to suggest cauda equina, conus medullaris, epidural abscess hematoma, or other more significant pathology in the back.  Labs ordered/reviewed.  Hemoglobin is 7.0 which is fairly consistent with baseline (8.0 on 10/24/2024 and 8.2 on 10/27/2024).  She has no evidence of bleeding at this time.    Was given medications per her care plan.  On reassessment, patient feels much better and feels safe for discharge.  Overall clinical picture consistent with sickle cell pain crisis.  Will follow-up with PCP/hematology.      I have reviewed the nursing notes. I have reviewed the findings, diagnosis, plan and need for follow up with the patient.    New Prescriptions    No medications on file       Final diagnoses:   Sickle cell pain crisis (H)       Raul Diaz DO  [facility] EMERGENCY DEPARTMENT  10/28/2024     Raul Diaz DO  10/29/24 0436

## 2024-10-29 NOTE — ED NOTES
Pt's port-a-cath de-accessed at discharge. Labs, accessed port-a-cath, medication, and observation completed while pt was in ED. All questions answered at discharge.

## 2024-10-30 ENCOUNTER — NURSE TRIAGE (OUTPATIENT)
Dept: ONCOLOGY | Facility: CLINIC | Age: 24
End: 2024-10-30
Payer: COMMERCIAL

## 2024-10-30 ENCOUNTER — INFUSION THERAPY VISIT (OUTPATIENT)
Dept: INFUSION THERAPY | Facility: CLINIC | Age: 24
End: 2024-10-30
Attending: PEDIATRICS
Payer: COMMERCIAL

## 2024-10-30 ENCOUNTER — PATIENT OUTREACH (OUTPATIENT)
Dept: CARE COORDINATION | Facility: CLINIC | Age: 24
End: 2024-10-30
Payer: COMMERCIAL

## 2024-10-30 VITALS
SYSTOLIC BLOOD PRESSURE: 128 MMHG | DIASTOLIC BLOOD PRESSURE: 68 MMHG | OXYGEN SATURATION: 96 % | HEART RATE: 76 BPM | RESPIRATION RATE: 14 BRPM

## 2024-10-30 DIAGNOSIS — D57.00 SICKLE CELL PAIN CRISIS (H): Primary | ICD-10-CM

## 2024-10-30 DIAGNOSIS — G81.10 SPASTIC HEMIPLEGIA, UNSPECIFIED ETIOLOGY, UNSPECIFIED LATERALITY (H): ICD-10-CM

## 2024-10-30 PROCEDURE — 250N000011 HC RX IP 250 OP 636: Performed by: PEDIATRICS

## 2024-10-30 PROCEDURE — 96376 TX/PRO/DX INJ SAME DRUG ADON: CPT

## 2024-10-30 PROCEDURE — 96361 HYDRATE IV INFUSION ADD-ON: CPT

## 2024-10-30 PROCEDURE — 96374 THER/PROPH/DIAG INJ IV PUSH: CPT

## 2024-10-30 PROCEDURE — 96375 TX/PRO/DX INJ NEW DRUG ADDON: CPT

## 2024-10-30 PROCEDURE — 258N000003 HC RX IP 258 OP 636: Performed by: PEDIATRICS

## 2024-10-30 RX ORDER — ONDANSETRON 2 MG/ML
8 INJECTION INTRAMUSCULAR; INTRAVENOUS EVERY 6 HOURS PRN
Status: CANCELLED
Start: 2025-01-01

## 2024-10-30 RX ORDER — KETOROLAC TROMETHAMINE 30 MG/ML
30 INJECTION, SOLUTION INTRAMUSCULAR; INTRAVENOUS ONCE
Status: CANCELLED
Start: 2025-01-01 | End: 2025-01-01

## 2024-10-30 RX ORDER — KETOROLAC TROMETHAMINE 30 MG/ML
30 INJECTION, SOLUTION INTRAMUSCULAR; INTRAVENOUS ONCE
Status: COMPLETED | OUTPATIENT
Start: 2024-10-30 | End: 2024-10-30

## 2024-10-30 RX ORDER — ONDANSETRON 2 MG/ML
8 INJECTION INTRAMUSCULAR; INTRAVENOUS EVERY 6 HOURS PRN
Status: DISCONTINUED | OUTPATIENT
Start: 2024-10-30 | End: 2024-10-30 | Stop reason: HOSPADM

## 2024-10-30 RX ORDER — DIPHENHYDRAMINE HCL 25 MG
50 CAPSULE ORAL
Status: CANCELLED
Start: 2025-01-01

## 2024-10-30 RX ORDER — ONDANSETRON 4 MG/1
8 TABLET, FILM COATED ORAL
Status: CANCELLED
Start: 2025-01-01

## 2024-10-30 RX ORDER — HEPARIN SODIUM (PORCINE) LOCK FLUSH IV SOLN 100 UNIT/ML 100 UNIT/ML
5 SOLUTION INTRAVENOUS
Status: DISCONTINUED | OUTPATIENT
Start: 2024-10-30 | End: 2024-10-30 | Stop reason: HOSPADM

## 2024-10-30 RX ORDER — HEPARIN SODIUM,PORCINE 10 UNIT/ML
5 VIAL (ML) INTRAVENOUS
Status: CANCELLED | OUTPATIENT
Start: 2025-01-01

## 2024-10-30 RX ORDER — HEPARIN SODIUM (PORCINE) LOCK FLUSH IV SOLN 100 UNIT/ML 100 UNIT/ML
5 SOLUTION INTRAVENOUS
Status: CANCELLED | OUTPATIENT
Start: 2025-01-01

## 2024-10-30 RX ADMIN — Medication 5 ML: at 13:54

## 2024-10-30 RX ADMIN — ONDANSETRON 8 MG: 2 INJECTION INTRAMUSCULAR; INTRAVENOUS at 11:25

## 2024-10-30 RX ADMIN — KETOROLAC TROMETHAMINE 30 MG: 30 INJECTION, SOLUTION INTRAMUSCULAR; INTRAVENOUS at 11:29

## 2024-10-30 RX ADMIN — HYDROMORPHONE HYDROCHLORIDE 1 MG: 1 INJECTION, SOLUTION INTRAMUSCULAR; INTRAVENOUS; SUBCUTANEOUS at 13:22

## 2024-10-30 RX ADMIN — SODIUM CHLORIDE, POTASSIUM CHLORIDE, SODIUM LACTATE AND CALCIUM CHLORIDE 1000 ML: 600; 310; 30; 20 INJECTION, SOLUTION INTRAVENOUS at 11:21

## 2024-10-30 RX ADMIN — HYDROMORPHONE HYDROCHLORIDE 1 MG: 1 INJECTION, SOLUTION INTRAMUSCULAR; INTRAVENOUS; SUBCUTANEOUS at 12:21

## 2024-10-30 RX ADMIN — HYDROMORPHONE HYDROCHLORIDE 1 MG: 1 INJECTION, SOLUTION INTRAMUSCULAR; INTRAVENOUS; SUBCUTANEOUS at 11:21

## 2024-10-30 NOTE — PATIENT INSTRUCTIONS
Dear Jennifer Cervantes    Thank you for choosing [facility] ([facility] for your infusion.  The following information is a summary of our appointment as well as important reminders.      If you have any questions on your upcoming Specialty Infusion appointments, please call scheduling at [phone].  It was a pleasure taking care of you today.    Sincerely,    [facility]  [address] [place] [address]  [address]  Phone:  [phone]

## 2024-10-30 NOTE — TELEPHONE ENCOUNTER
Oncology Nurse Triage - Sickle Cell Pain Crisis:    Situation: Jennifer  calling about Sickle Cell Pain Crisis, requesting to be added on for IV fluids and pain medicine    Background:     Patient's last infusion was 10/28/24 ED  Last clinic visit date:10/21/24 w/  Does patient have active treatment plan?  Yes      Assessment of Symptoms:  Onset/Duration of symptoms: 1 day    Is it typical sickle cell pain? Yes   Location: Left arm  Character: Sharp           Intensity: 7/10    Any radiation of pain, numbness, tingling, weakness, warmth, swelling, discoloration of arms or legs?No     Fever?No  (if yes max temperature recorded in last 24 hours):      Chest Pain Present: No     Shortness of breath: No     Other home therapies tried: HEAT/HEATING PAD and WARM BATH     Last home medication taken and when: 0600 oxycodone    Any Refills Needed?: No     Does patient have transportation & length of time to get to clinic: Can try to find transportation if something opens up early, otherwise, will need assistance.          Recommendations:     If you do not hear from the infusion center by 2pm then you will not be able to get in for an infusion today. If symptoms worsen while waiting for call back, and/or you experience fever, chills, SOB, chest pain, cough, n/v, dizziness, numbness, swelling, discoloration of extremities, then seek emergency evaluation in Emergency Department.     Fredo page to  for approval.     approving adding on pt to wait list today.

## 2024-10-30 NOTE — PROGRESS NOTES
Infusion Nursing Note:  Jennifer Cervantes presents today for IVF/pain meds.    Patient seen by provider today: No   present during visit today: Not Applicable.    Note:   1L LR infused over 2 hours.  Dilaudid given x 3 doses  Toradol given IVP  Zofran given IVP    Pre-infusion pain: 8/10  Post-infusion pain: 4/10    Intravenous Access:  Implanted Port.    Treatment Conditions:  Not Applicable.    Administrations This Visit       heparin lock flush 100 unit/mL injection 5 mL       Admin Date  10/30/2024 Action  $Given Dose  5 mL Route  Intracatheter Documented By  Elena Kelly RN              HYDROmorphone (DILAUDID) injection 1 mg       Admin Date  10/30/2024 Action  $Given Dose  1 mg Route  Intravenous Documented By  Eulalia Rashid RN               Admin Date  10/30/2024 Action  $Given Dose  1 mg Route  Intravenous Documented By  Eulalia Rashid RN               Admin Date  10/30/2024 Action  $Given Dose  1 mg Route  Intravenous Documented By  Eulalia Rashid RN              ketorolac (TORADOL) injection 30 mg       Admin Date  10/30/2024 Action  $Given Dose  30 mg Route  Intravenous Documented By  Eulalia Rashid RN              lactated ringers BOLUS 1,000 mL       Admin Date  10/30/2024 Action  $New Bag Dose  1,000 mL Rate  500 mL/hr Route  Intravenous Documented By  Eulalia Rashid RN              ondansetron (ZOFRAN) injection 8 mg       Admin Date  10/30/2024 Action  $Given Dose  8 mg Route  Intravenous Documented By  Eulalia Rashid RN                  /68 (BP Location: Left arm, Patient Position: Semi-Short's, Cuff Size: Adult Regular)   Pulse 76   Resp 14   SpO2 96%     Post Infusion Assessment:  Patient tolerated infusion without incident.  Blood return noted pre and post infusion.  Site patent and intact, free from redness, edema or discomfort.  No evidence of extravasations.  Access discontinued per protocol.       Discharge Plan:   Discharge instructions reviewed with:  Patient.  Patient and/or family verbalized understanding of discharge instructions and all questions answered.  AVS to patient via [employer].  Patient will return PRN for next appointment.   Patient discharged in stable condition accompanied by: self.  Departure Mode: Ambulatory.      Eulalia Rashid RN

## 2024-10-30 NOTE — PROGRESS NOTES
Ambulatory Care Management- Transportation Support  [facility]     Data/Intervention:  Patient Name:    Jennifer Cervantes DOB/Age: 2000 (24 year old)     [facility] team member assisting Specialty SW with transportation request.      Assessment:  CHW/CTA called [facility] [facility] to arrange medical appointment transportation in conjunction with patient's insurance.     Taxi company: [employer]    Patient will need to call above taxi company at phone number: [phone] when ready for return ride home.      Plan:  Patient is aware of the transportation plan.  available to assist with any other needs.      Maritza Vick MA

## 2024-10-30 NOTE — TELEPHONE ENCOUNTER
[facility] Pt confirming she is able to make appt. Message sent to SW to assist with transportation and CCOD to have pt added to schedule.

## 2024-10-31 ENCOUNTER — HOSPITAL ENCOUNTER (EMERGENCY)
Facility: CLINIC | Age: 24
Discharge: HOME | End: 2024-10-31
Attending: EMERGENCY MEDICINE | Admitting: EMERGENCY MEDICINE
Payer: COMMERCIAL

## 2024-10-31 ENCOUNTER — ONCOLOGY VISIT (OUTPATIENT)
Dept: ONCOLOGY | Facility: CLINIC | Age: 24
End: 2024-10-31
Attending: REGISTERED NURSE
Payer: COMMERCIAL

## 2024-10-31 VITALS
SYSTOLIC BLOOD PRESSURE: 133 MMHG | HEART RATE: 110 BPM | RESPIRATION RATE: 14 BRPM | OXYGEN SATURATION: 96 % | DIASTOLIC BLOOD PRESSURE: 85 MMHG | BODY MASS INDEX: 27.12 KG/M2 | WEIGHT: 158 LBS | TEMPERATURE: 99.2 F

## 2024-10-31 VITALS
RESPIRATION RATE: 16 BRPM | TEMPERATURE: 98.7 F | SYSTOLIC BLOOD PRESSURE: 118 MMHG | DIASTOLIC BLOOD PRESSURE: 75 MMHG | OXYGEN SATURATION: 96 % | HEART RATE: 85 BPM

## 2024-10-31 DIAGNOSIS — D57.00 SICKLE CELL PAIN CRISIS (H): ICD-10-CM

## 2024-10-31 DIAGNOSIS — R11.2 NAUSEA AND VOMITING, UNSPECIFIED VOMITING TYPE: ICD-10-CM

## 2024-10-31 DIAGNOSIS — D57.1 HB-SS DISEASE WITHOUT CRISIS (H): ICD-10-CM

## 2024-10-31 DIAGNOSIS — E83.111 IRON OVERLOAD DUE TO REPEATED RED BLOOD CELL TRANSFUSIONS: Primary | ICD-10-CM

## 2024-10-31 LAB
ALBUMIN SERPL BCG-MCNC: 4.7 G/DL (ref 3.5–5.2)
ALP SERPL-CCNC: 64 U/L (ref 40–150)
ALT SERPL W P-5'-P-CCNC: 22 U/L (ref 0–50)
ANION GAP SERPL CALCULATED.3IONS-SCNC: 11 MMOL/L (ref 7–15)
AST SERPL W P-5'-P-CCNC: 35 U/L (ref 0–45)
BASOPHILS # BLD AUTO: 0.2 10E3/UL (ref 0–0.2)
BASOPHILS NFR BLD AUTO: 2 %
BILIRUB SERPL-MCNC: 1.8 MG/DL
BUN SERPL-MCNC: 6.6 MG/DL (ref 6–20)
CALCIUM SERPL-MCNC: 9 MG/DL (ref 8.8–10.4)
CHLORIDE SERPL-SCNC: 103 MMOL/L (ref 98–107)
CREAT SERPL-MCNC: 0.52 MG/DL (ref 0.51–0.95)
EGFRCR SERPLBLD CKD-EPI 2021: >90 ML/MIN/1.73M2
EOSINOPHIL # BLD AUTO: 0.4 10E3/UL (ref 0–0.7)
EOSINOPHIL NFR BLD AUTO: 4 %
ERYTHROCYTE [DISTWIDTH] IN BLOOD BY AUTOMATED COUNT: 22.2 % (ref 10–15)
GLUCOSE SERPL-MCNC: 90 MG/DL (ref 70–99)
HCO3 SERPL-SCNC: 23 MMOL/L (ref 22–29)
HCT VFR BLD AUTO: 23.2 % (ref 35–47)
HGB BLD-MCNC: 7.9 G/DL (ref 11.7–15.7)
IMM GRANULOCYTES # BLD: 0 10E3/UL
IMM GRANULOCYTES NFR BLD: 0 %
LYMPHOCYTES # BLD AUTO: 2 10E3/UL (ref 0.8–5.3)
LYMPHOCYTES NFR BLD AUTO: 19 %
MCH RBC QN AUTO: 29 PG (ref 26.5–33)
MCHC RBC AUTO-ENTMCNC: 34.1 G/DL (ref 31.5–36.5)
MCV RBC AUTO: 85 FL (ref 78–100)
MONOCYTES # BLD AUTO: 0.6 10E3/UL (ref 0–1.3)
MONOCYTES NFR BLD AUTO: 6 %
NEUTROPHILS # BLD AUTO: 7.2 10E3/UL (ref 1.6–8.3)
NEUTROPHILS NFR BLD AUTO: 68 %
NRBC # BLD AUTO: 0.1 10E3/UL
NRBC BLD AUTO-RTO: 1 /100
PLATELET # BLD AUTO: 605 10E3/UL (ref 150–450)
POTASSIUM SERPL-SCNC: 3.9 MMOL/L (ref 3.4–5.3)
PROT SERPL-MCNC: 7.9 G/DL (ref 6.4–8.3)
RBC # BLD AUTO: 2.72 10E6/UL (ref 3.8–5.2)
RETICS # AUTO: 0.14 10E6/UL (ref 0.03–0.1)
RETICS/RBC NFR AUTO: 10.9 % (ref 0.5–2)
SODIUM SERPL-SCNC: 137 MMOL/L (ref 135–145)
WBC # BLD AUTO: 10.5 10E3/UL (ref 4–11)

## 2024-10-31 PROCEDURE — 36415 COLL VENOUS BLD VENIPUNCTURE: CPT | Performed by: EMERGENCY MEDICINE

## 2024-10-31 PROCEDURE — 99285 EMERGENCY DEPT VISIT HI MDM: CPT | Performed by: EMERGENCY MEDICINE

## 2024-10-31 PROCEDURE — G2211 COMPLEX E/M VISIT ADD ON: HCPCS | Performed by: REGISTERED NURSE

## 2024-10-31 PROCEDURE — G0463 HOSPITAL OUTPT CLINIC VISIT: HCPCS | Performed by: REGISTERED NURSE

## 2024-10-31 PROCEDURE — 96376 TX/PRO/DX INJ SAME DRUG ADON: CPT | Performed by: EMERGENCY MEDICINE

## 2024-10-31 PROCEDURE — 99284 EMERGENCY DEPT VISIT MOD MDM: CPT | Mod: 25 | Performed by: EMERGENCY MEDICINE

## 2024-10-31 PROCEDURE — 96374 THER/PROPH/DIAG INJ IV PUSH: CPT | Performed by: EMERGENCY MEDICINE

## 2024-10-31 PROCEDURE — 85004 AUTOMATED DIFF WBC COUNT: CPT | Performed by: EMERGENCY MEDICINE

## 2024-10-31 PROCEDURE — 85045 AUTOMATED RETICULOCYTE COUNT: CPT | Performed by: EMERGENCY MEDICINE

## 2024-10-31 PROCEDURE — 99215 OFFICE O/P EST HI 40 MIN: CPT | Performed by: REGISTERED NURSE

## 2024-10-31 PROCEDURE — 82947 ASSAY GLUCOSE BLOOD QUANT: CPT | Performed by: EMERGENCY MEDICINE

## 2024-10-31 PROCEDURE — 96375 TX/PRO/DX INJ NEW DRUG ADDON: CPT | Performed by: EMERGENCY MEDICINE

## 2024-10-31 PROCEDURE — 84155 ASSAY OF PROTEIN SERUM: CPT | Performed by: EMERGENCY MEDICINE

## 2024-10-31 PROCEDURE — 250N000011 HC RX IP 250 OP 636: Performed by: EMERGENCY MEDICINE

## 2024-10-31 RX ORDER — HEPARIN SODIUM (PORCINE) LOCK FLUSH IV SOLN 100 UNIT/ML 100 UNIT/ML
5-10 SOLUTION INTRAVENOUS
Status: DISCONTINUED | OUTPATIENT
Start: 2024-10-31 | End: 2024-11-01 | Stop reason: HOSPADM

## 2024-10-31 RX ORDER — KETOROLAC TROMETHAMINE 15 MG/ML
15 INJECTION, SOLUTION INTRAMUSCULAR; INTRAVENOUS ONCE
Status: COMPLETED | OUTPATIENT
Start: 2024-10-31 | End: 2024-10-31

## 2024-10-31 RX ORDER — ONDANSETRON 2 MG/ML
8 INJECTION INTRAMUSCULAR; INTRAVENOUS
Status: COMPLETED | OUTPATIENT
Start: 2024-10-31 | End: 2024-10-31

## 2024-10-31 RX ADMIN — HYDROMORPHONE HYDROCHLORIDE 1 MG: 1 INJECTION, SOLUTION INTRAMUSCULAR; INTRAVENOUS; SUBCUTANEOUS at 18:45

## 2024-10-31 RX ADMIN — ONDANSETRON 8 MG: 2 INJECTION INTRAMUSCULAR; INTRAVENOUS at 18:48

## 2024-10-31 RX ADMIN — HEPARIN 5 ML: 100 SYRINGE at 22:31

## 2024-10-31 RX ADMIN — KETOROLAC TROMETHAMINE 15 MG: 15 INJECTION, SOLUTION INTRAMUSCULAR; INTRAVENOUS at 18:52

## 2024-10-31 RX ADMIN — HYDROMORPHONE HYDROCHLORIDE 1 MG: 1 INJECTION, SOLUTION INTRAMUSCULAR; INTRAVENOUS; SUBCUTANEOUS at 21:31

## 2024-10-31 RX ADMIN — HYDROMORPHONE HYDROCHLORIDE 1 MG: 1 INJECTION, SOLUTION INTRAMUSCULAR; INTRAVENOUS; SUBCUTANEOUS at 20:13

## 2024-10-31 ASSESSMENT — ACTIVITIES OF DAILY LIVING (ADL)
ADLS_ACUITY_SCORE: 0

## 2024-10-31 ASSESSMENT — PAIN SCALES - GENERAL: PAINLEVEL_OUTOF10: SEVERE PAIN (7)

## 2024-10-31 NOTE — ED TRIAGE NOTES
Patient states to have had a few appointments today, and the weather has messed with her body. States to be in sickle cell crisis with generalized pain all over.      Triage Assessment (Adult)       Row Name 10/31/24 8402          Triage Assessment    Airway WDL WDL        Respiratory WDL    Respiratory WDL WDL        Skin Circulation/Temperature WDL    Skin Circulation/Temperature WDL WDL        Cardiac WDL    Cardiac WDL WDL        Peripheral/Neurovascular WDL    Peripheral Neurovascular WDL WDL        Cognitive/Neuro/Behavioral WDL    Cognitive/Neuro/Behavioral WDL WDL

## 2024-10-31 NOTE — Clinical Note
10/31/2024      Jennifer Cervantes  [address]  [facility] [address]      Dear Colleague,    Thank you for referring your patient, Jennifer Cervantes, to the [facility]. Please see a copy of my visit note below.    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 She had multiple thromboembolic events in 2021 despite adherent anticoagulation use (though warfarin was perpetually low) and there are concerns for chronic thromboembolic disease but did not have pulmonary HTN on a November 2021 cath. She is maintained on chronic PO opioids and twice-weekly infusion visits (since 1/24/22) but has been able to be maintained on this regimen and has stayed out of the ED most of the time with even rarer admissions for most of 2023. In 2024, her ED visits have increased somewhat but admissions remain rare.    Interval History:  ***          Sickle Cell Disease Comprehensive Checklist  Bone Health/Avascular Necrosis Screening/Symptoms (each visit): no new concerns today  Leg Ulcer evaluation (every visit): nothing to note  Hypertension (every visit):stable none for several months  Last pulmonary evaluation (asthma, AMAN, pulm HTN): 9/28/22, due for follow-up  Stroke/silent cerebral infarct Hx (Y/N): Yes TIA ~2014, first event ~age 1 with full stroke and R sided weakness  Last PCP Visit: 9/30/24 with Dr. Case  Vaccines:  PCV13: 5/13/19  Pneumovax (PPSV23): 3/04, 10/09, 7/12/19, 10/2024, due in 2029  Menactra: 4/2010, 9/2015 (MCV done 8/16/21)  MenB:  9/16/15, 5/13/19 (due in 2024)  Influenza: 10/11/24  Audiology (chelation): done 2/27/24    Comparison to Previous Audiogram dated 6/6/2022:     Pure Tone Thresholds 250-8000 Hz:    RIGHT: stable    LEFT: stable     High Frequency Audiometry 9,000-16,000Hz:     RIGHT: stable    LEFT: stable     Word Recognition Score:    RIGHT: stable    LEFT: stable    Plan last reviewed with patient: 10/2/23    Patient background: 24 yo F, enjoys movies and kids though there are times where she does not really want to talk to people. Does not have a lot of social support at home.     Sickle Cell Disease History  Primary Hematologist Team: Jose Rafael Duncan  PCP: none  Genotype: SS  Acute Pain Crisis Treatment: (limiting IV)   ER   Dilaudid 1 mg IV q1h up to 3 doses  Toradol 30 mg IV x1   Maintenance IV fluids with LR  Other: Zofran 8 mg IV PRN nausea  Inpatient:  PCA Dilaudid 1 hr q30 minutes, no basal rate  Toradol 30 mg x6h x 4 hr  LR maintenance x 1-2 days  Other Medications: Zofran  ASA  Supportive Care: Docusate, Senna  Chronic Pain Medications:    Home regimen: oxycodone 15 mg p.o. q.4-6 hours p.r.n. breakthrough pain.  She also continues on Voltaren gel, and Zoloft among other medications.    -Also benefits from mental health visits, acupuncture  Baseline Hemoglobin: 7 g/dl without chronic transfusions  Hydroxyurea use: Yes  H/O blood transfusions: Yes, several (iron overload) Most recent 11/20/2021  H/O Transfusion Reactions: no  Antibodies:none  H/O Acute Chest Syndrome: Yes  Last episode:9/05/22 (previously 4/26/21, 10/2019)   ICU/intubation: not with 9/2022 admission  H/O Stroke: Yes (managed with chronic transfusions in the past, stopped late Spring 2020)  H/O VTE: Yes (2/2021)  H/O Cholecystectomy or Splenectomy: no  H/O Asthma, Pulm HTN, AVN, Leg Ulcers, Nephropathy, Retinopathy, etc: Iron overload, asthma, chronic lung disease, physical limitations from early stroke    ---------------------------------------  Jennifer NEWMAN  Billy's Goals (reviewed today 10/21/24)    1-3 month goal:  Continue to look for a job (but not urgent)    6 month goal:      12 month goal:      Disease-specific goal(s):  Decrease frequency of ED visits. Decrease opioid qty per weekly refill. She'd like to get down to 5 tablets per week by October--may be extended a bit      Current Outpatient Medications   Medication Sig Dispense Refill    albuterol (PROVENTIL) (2.5 MG/3ML) 0.083% neb solution Take 2 vials (5 mg) by nebulization every 6 hours as needed for shortness of breath or wheezing. 90 mL 3    aspirin (ASA) 81 MG chewable tablet Take 1 tablet (81 mg) by mouth 2 times daily 60 tablet 11    budesonide-formoterol (SYMBICORT) 160-4.5 MCG/ACT Inhaler Inhale 2 puffs twice daily plus 1-2 puffs as needed. May use up to 12 puffs per day. 20.4 g 11    deferasirox (JADENU) 360 MG tablet Take 4 tablets (1,440 mg) by mouth daily. 120 tablet 11    Deferiprone, Twice Daily, 1000 MG TABS Take 2,000 mg by mouth 2 times daily. 150 tablet 3    diphenhydrAMINE (BENADRYL) 50 MG capsule Take 1 capsule (50 mg) by mouth every 6 hours as needed for itching or allergies. Administer 12  hours pre - IV contrast injection and patient must have a . 1 capsule 0    EPINEPHrine (ANY BX GENERIC EQUIV) 0.3 MG/0.3ML injection 2-pack Inject 0.3 mLs (0.3 mg) into the muscle as needed for anaphylaxis. 1 each 1    gabapentin (NEURONTIN) 300 MG capsule Take 1 capsule (300 mg) by mouth 3 times daily. Take PO 1 pill in evening (300 mg) TID to start. If tolerated, increase by 300 mg in morning or lunch every few days, updating your doctor's office in time to get refills. The maximum dose is 900 mg TID. (Patient taking differently: Take 600 mg by mouth at bedtime. Take PO 1 pill in evening (300 mg) TID to start. If tolerated, increase by 300 mg in morning or lunch every few days, updating your doctor's office in time to get refills. The maximum dose is 900 mg TID.) 90 capsule 1    hydroxyurea  (HYDREA) 500 MG capsule Take 6 capsules (3,000 mg) by mouth daily 180 capsule 11    ibuprofen (ADVIL/MOTRIN) 800 MG tablet Take 800 mg by mouth every 8 hours as needed for moderate pain      melatonin 5 MG tablet Take 1 tablet (5 mg) by mouth nightly as needed for sleep 30 tablet 1    methocarbamol (ROBAXIN) 750 MG tablet Take 1 tablet (750 mg) by mouth 4 times daily as needed for muscle spasms (during sickle pain crises. Okay to take scheduled while in pain). 60 tablet 1    methylPREDNISolone (MEDROL) 32 MG tablet Take 1 tablet (32 mg) by mouth daily. 2 hours prior to the procedure with IV contrast 1 tablet 0    naloxone (NARCAN) 4 MG/0.1ML nasal spray Spray 4 mg into one nostril alternating nostrils as needed for opioid reversal. every 2-3 minutes until assistance arrives 0.2 mL 0    ondansetron (ZOFRAN ODT) 8 MG ODT tab Take 1 tablet (8 mg) by mouth every 8 hours as needed for nausea 40 tablet 4    oxyCODONE IR (ROXICODONE) 10 MG tablet Take 1 tablet (10 mg) by mouth every 6 hours as needed for severe pain or breakthrough pain. Goal 4 per day. Max 6 per day. 10 tablet 0    pantoprazole (PROTONIX) 40 MG EC tablet Take 1 tablet (40 mg) by mouth daily. 30 tablet 0     Past Medical History  Past Medical History:   Diagnosis Date    Anxiety     Bleeding disorder (H)     Blood clotting disorder (H)     Cerebral infarction (H) 2015    Cognitive developmental delay     low IQ. 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 side (H)     right hand contractures    Iron overload due to repeated red blood cell transfusions     Migraines     Multiple subsegmental pulmonary emboli without acute cor pulmonale (H) 02/01/2021    Oppositional defiant behavior     Presence of intrauterine contraceptive device 2/18/2020    Superficial venous thrombosis of arm, right 03/25/2021    Uncomplicated asthma      Past  Surgical History:   Procedure Laterality Date    AS INSERT TUNNELED CV 2 CATH W/O PORT/PUMP      CHOLECYSTECTOMY      CV RIGHT HEART CATH MEASUREMENTS RECORDED N/A 11/18/2021    Procedure: Right Heart Cath;  Surgeon: Jackson Stauffer MD;  Location:  HEART CARDIAC CATH LAB    INSERT PORT VASCULAR ACCESS Left 4/21/2021    Procedure: INSERTION, VASCULAR ACCESS PORT (NOT SURE ON SIDE UNTIL REMOVAL);  Surgeon: Rajan More MD;  Location: [facility] OR    IR CHEST PORT PLACEMENT > 5 YRS OF AGE  4/21/2021    IR CVC NON TUNNEL LINE REMOVAL  5/6/2021    IR CVC NON TUNNEL PLACEMENT > 5 YRS  04/07/2020    IR CVC NON TUNNEL PLACEMENT > 5 YRS  4/30/2021    IR CVC NON TUNNEL PLACEMENT > 5 YRS  9/7/2022    IR PORT REMOVAL LEFT  4/21/2021    REMOVE PORT VASCULAR ACCESS Left 4/21/2021    Procedure: REMOVAL, VASCULAR ACCESS PORT LEFT;  Surgeon: Rajan More MD;  Location: [facility] OR    REPAIR TENDON ELBOW Right 10/02/2019    Procedure: Right Elbow Flexor Lengthening, Flexor Pronator Slide Of Wrist and Finger, Thumb Adductor Lengthening;  Surgeon: Anai Franco MD;  Location: [facility] OR    TONSILLECTOMY Bilateral 10/02/2019    Procedure: Bilateral Tonsillectomy;  Surgeon: Farhana Guy MD;  Location: [facility] OR    ZZC BREAST REDUCTION (INCLUDES LIPO) TIER 3 Bilateral 04/23/2019     Allergies   Allergen Reactions    Contrast Dye Angioedema     Hives and breathing issues    Fish-Derived Products Hives    Seafood Hives    Adhesive Tape Hives     Primipore dressing causes hives    Gadolinium     Iodinated Contrast Media      Social History   Social History     Tobacco Use    Smoking status: Never     Passive exposure: Never    Smokeless tobacco: Never   Substance Use Topics    Alcohol use: Not Currently     Alcohol/week: 0.0 standard drinks of alcohol    Drug use: Never   Her mom lives next door to her.  Past medical history and social history were reviewed.    Physical Examination:  /85 (BP Location: Right arm, Patient  Position: Sitting, Cuff Size: Adult Regular)   Pulse 110   Temp 99.2  F (37.3  C) (Oral)   Resp 14   Wt 71.7 kg (158 lb)   SpO2 96%   BMI 27.12 kg/m   (Vitals from infusion reviewed)      Wt Readings from Last 10 Encounters:   10/31/24 71.7 kg (158 lb)   10/24/24 69.4 kg (153 lb)   10/23/24 70.8 kg (156 lb)   10/21/24 69.7 kg (153 lb 9.6 oz)   10/17/24 70.3 kg (155 lb)   10/17/24 70.9 kg (156 lb 3.2 oz)   10/12/24 70.3 kg (155 lb)   10/11/24 71 kg (156 lb 9.6 oz)   10/04/24 68.9 kg (152 lb)   09/26/24 69.4 kg (152 lb 14.4 oz)     General: Pleasant female, NAD, in a good mood today  Eyes: EOMI, PERRL  Respiratory: Normal effort, no adventitious breath sounds  Ext: no peripheral edema  Neurologic: chronic contracture of right arm and wrist. Did not observe gait today. A/O x 4.  Skin: No rashes, petechiae, or bruising noted on exposed skin.   Laboratory Data:  Recent Results (from the past 240 hours)   Comprehensive metabolic panel    Collection Time: 10/23/24 12:14 PM   Result Value Ref Range    Sodium 136 135 - 145 mmol/L    Potassium 4.2 3.4 - 5.3 mmol/L    Carbon Dioxide (CO2) 25 22 - 29 mmol/L    Anion Gap 8 7 - 15 mmol/L    Urea Nitrogen 6.8 6.0 - 20.0 mg/dL    Creatinine 0.52 0.51 - 0.95 mg/dL    GFR Estimate >90 >60 mL/min/1.73m2    Calcium 9.3 8.8 - 10.4 mg/dL    Chloride 103 98 - 107 mmol/L    Glucose 103 (H) 70 - 99 mg/dL    Alkaline Phosphatase 63 40 - 150 U/L    AST 29 0 - 45 U/L    ALT 19 0 - 50 U/L    Protein Total 8.1 6.4 - 8.3 g/dL    Albumin 4.5 3.5 - 5.2 g/dL    Bilirubin Total 1.2 <=1.2 mg/dL   Reticulocyte count    Collection Time: 10/23/24 12:14 PM   Result Value Ref Range    % Reticulocyte 10.8 (H) 0.5 - 2.0 %    Absolute Reticulocyte 0.315 (H) 0.025 - 0.095 10e6/uL   Lipase    Collection Time: 10/23/24 12:14 PM   Result Value Ref Range    Lipase 24 13 - 60 U/L   HCG qualitative Blood    Collection Time: 10/23/24 12:14 PM   Result Value Ref Range    hCG Serum Qualitative Negative Negative    CBC with platelets and differential    Collection Time: 10/23/24 12:14 PM   Result Value Ref Range    WBC Count 10.2 4.0 - 11.0 10e3/uL    RBC Count 3.03 (L) 3.80 - 5.20 10e6/uL    Hemoglobin 8.6 (L) 11.7 - 15.7 g/dL    Hematocrit 25.7 (L) 35.0 - 47.0 %    MCV 85 78 - 100 fL    MCH 28.4 26.5 - 33.0 pg    MCHC 33.5 31.5 - 36.5 g/dL    RDW 21.6 (H) 10.0 - 15.0 %    Platelet Count 736 (H) 150 - 450 10e3/uL    % Neutrophils 86 %    % Lymphocytes 8 %    % Monocytes 5 %    % Eosinophils 0 %    % Basophils 1 %    % Immature Granulocytes 0 %    NRBCs per 100 WBC 1 (H) <1 /100    Absolute Neutrophils 8.8 (H) 1.6 - 8.3 10e3/uL    Absolute Lymphocytes 0.8 0.8 - 5.3 10e3/uL    Absolute Monocytes 0.6 0.0 - 1.3 10e3/uL    Absolute Eosinophils 0.0 0.0 - 0.7 10e3/uL    Absolute Basophils 0.1 0.0 - 0.2 10e3/uL    Absolute Immature Granulocytes 0.0 <=0.4 10e3/uL    Absolute NRBCs 0.1 10e3/uL   RBC and Platelet Morphology    Collection Time: 10/23/24 12:14 PM   Result Value Ref Range    RBC Morphology Confirmed RBC Indices     Platelet Assessment  Automated Count Confirmed. Platelet morphology is normal.     Automated Count Confirmed. Platelet morphology is normal.    Polychromasia Slight (A) None Seen    RBC Fragments Slight (A) None Seen    Sickle Cells Slight (A) None Seen    Target Cells Slight (A) None Seen   Lactic acid whole blood with 1x repeat in 2 hr when >2    Collection Time: 10/23/24 12:15 PM   Result Value Ref Range    Lactic Acid, Initial 1.1 0.7 - 2.0 mmol/L   EKG 12-lead, tracing only    Collection Time: 10/24/24 10:29 PM   Result Value Ref Range    Systolic Blood Pressure  mmHg    Diastolic Blood Pressure  mmHg    Ventricular Rate 72 BPM    Atrial Rate 72 BPM    AZ Interval 154 ms    QRS Duration 76 ms     ms    QTc 442 ms    P Axis 78 degrees    R AXIS 46 degrees    T Axis 32 degrees    Interpretation ECG       Sinus rhythm  Normal ECG  Unconfirmed report - interpretation of this ECG is computer generated -  see medical record for final interpretation    Confirmed by - EMERGENCY ROOM, PHYSICIAN ([id number]),  KRISHNA GAMBLE ([id number]) on 10/25/2024 3:35:37 PM     INR    Collection Time: 10/24/24 11:01 PM   Result Value Ref Range    INR 1.14 0.85 - 1.15   Comprehensive metabolic panel    Collection Time: 10/24/24 11:01 PM   Result Value Ref Range    Sodium 139 135 - 145 mmol/L    Potassium 3.7 3.4 - 5.3 mmol/L    Carbon Dioxide (CO2) 26 22 - 29 mmol/L    Anion Gap 7 7 - 15 mmol/L    Urea Nitrogen 8.9 6.0 - 20.0 mg/dL    Creatinine 0.70 0.51 - 0.95 mg/dL    GFR Estimate >90 >60 mL/min/1.73m2    Calcium 8.6 (L) 8.8 - 10.4 mg/dL    Chloride 106 98 - 107 mmol/L    Glucose 87 70 - 99 mg/dL    Alkaline Phosphatase 54 40 - 150 U/L    AST 30 0 - 45 U/L    ALT 22 0 - 50 U/L    Protein Total 7.0 6.4 - 8.3 g/dL    Albumin 4.0 3.5 - 5.2 g/dL    Bilirubin Total 1.4 (H) <=1.2 mg/dL   Lipase    Collection Time: 10/24/24 11:01 PM   Result Value Ref Range    Lipase 29 13 - 60 U/L   Troponin T, High Sensitivity    Collection Time: 10/24/24 11:01 PM   Result Value Ref Range    Troponin T, High Sensitivity <6 <=14 ng/L   HCG qualitative Blood    Collection Time: 10/24/24 11:01 PM   Result Value Ref Range    hCG Serum Qualitative Negative Negative   CBC with platelets and differential    Collection Time: 10/24/24 11:01 PM   Result Value Ref Range    WBC Count 11.8 (H) 4.0 - 11.0 10e3/uL    RBC Count 2.72 (L) 3.80 - 5.20 10e6/uL    Hemoglobin 8.0 (L) 11.7 - 15.7 g/dL    Hematocrit 23.7 (L) 35.0 - 47.0 %    MCV 87 78 - 100 fL    MCH 29.4 26.5 - 33.0 pg    MCHC 33.8 31.5 - 36.5 g/dL    RDW 21.1 (H) 10.0 - 15.0 %    Platelet Count 700 (H) 150 - 450 10e3/uL    % Neutrophils 66 %    % Lymphocytes 25 %    % Monocytes 6 %    % Eosinophils 2 %    % Basophils 2 %    % Immature Granulocytes 0 %    NRBCs per 100 WBC 0 <1 /100    Absolute Neutrophils 7.8 1.6 - 8.3 10e3/uL    Absolute Lymphocytes 2.9 0.8 - 5.3 10e3/uL    Absolute Monocytes 0.7 0.0 - 1.3  10e3/uL    Absolute Eosinophils 0.2 0.0 - 0.7 10e3/uL    Absolute Basophils 0.2 0.0 - 0.2 10e3/uL    Absolute Immature Granulocytes 0.0 <=0.4 10e3/uL    Absolute NRBCs 0.0 10e3/uL   Reticulocyte count    Collection Time: 10/24/24 11:01 PM   Result Value Ref Range    % Reticulocyte 7.7 (H) 0.5 - 2.0 %    Absolute Reticulocyte 0.232 (H) 0.025 - 0.095 10e6/uL   HCG quantitative pregnancy (blood)    Collection Time: 10/24/24 11:01 PM   Result Value Ref Range    hCG Quantitative <1 <5 mIU/mL   Basic metabolic panel    Collection Time: 10/27/24  3:40 AM   Result Value Ref Range    Sodium 141 135 - 145 mmol/L    Potassium 4.0 3.4 - 5.3 mmol/L    Chloride 106 98 - 107 mmol/L    Carbon Dioxide (CO2) 25 22 - 29 mmol/L    Anion Gap 10 7 - 15 mmol/L    Urea Nitrogen 8.1 6.0 - 20.0 mg/dL    Creatinine 0.53 0.51 - 0.95 mg/dL    GFR Estimate >90 >60 mL/min/1.73m2    Calcium 9.0 8.8 - 10.4 mg/dL    Glucose 94 70 - 99 mg/dL   Reticulocyte count    Collection Time: 10/27/24  3:40 AM   Result Value Ref Range    % Reticulocyte 5.4 (H) 0.5 - 2.0 %    Absolute Reticulocyte 0.156 (H) 0.025 - 0.095 10e6/uL   CBC with platelets and differential    Collection Time: 10/27/24  3:40 AM   Result Value Ref Range    WBC Count 9.8 4.0 - 11.0 10e3/uL    RBC Count 2.94 (L) 3.80 - 5.20 10e6/uL    Hemoglobin 8.2 (L) 11.7 - 15.7 g/dL    Hematocrit 24.5 (L) 35.0 - 47.0 %    MCV 83 78 - 100 fL    MCH 27.9 26.5 - 33.0 pg    MCHC 33.5 31.5 - 36.5 g/dL    RDW 21.0 (H) 10.0 - 15.0 %    Platelet Count 711 (H) 150 - 450 10e3/uL    % Neutrophils 61 %    % Lymphocytes 24 %    % Monocytes 8 %    % Eosinophils 4 %    % Basophils 2 %    % Immature Granulocytes 1 %    NRBCs per 100 WBC 0 <1 /100    Absolute Neutrophils 6.0 1.6 - 8.3 10e3/uL    Absolute Lymphocytes 2.4 0.8 - 5.3 10e3/uL    Absolute Monocytes 0.7 0.0 - 1.3 10e3/uL    Absolute Eosinophils 0.4 0.0 - 0.7 10e3/uL    Absolute Basophils 0.2 0.0 - 0.2 10e3/uL    Absolute Immature Granulocytes 0.1 <=0.4  10e3/uL    Absolute NRBCs 0.0 10e3/uL   RBC and Platelet Morphology    Collection Time: 10/27/24  3:40 AM   Result Value Ref Range    RBC Morphology Confirmed RBC Indices     Platelet Assessment  Automated Count Confirmed. Platelet morphology is normal.     Automated Count Confirmed. Platelet morphology is normal.    Sickle Cells Slight (A) None Seen    Stomatocytes Slight (A) None Seen    Target Cells Slight (A) None Seen    Teardrop Cells Slight (A) None Seen   Comprehensive metabolic panel    Collection Time: 10/29/24  1:03 AM   Result Value Ref Range    Sodium 138 135 - 145 mmol/L    Potassium 3.9 3.4 - 5.3 mmol/L    Carbon Dioxide (CO2) 23 22 - 29 mmol/L    Anion Gap 12 7 - 15 mmol/L    Urea Nitrogen 8.0 6.0 - 20.0 mg/dL    Creatinine 0.59 0.51 - 0.95 mg/dL    GFR Estimate >90 >60 mL/min/1.73m2    Calcium 8.9 8.8 - 10.4 mg/dL    Chloride 103 98 - 107 mmol/L    Glucose 88 70 - 99 mg/dL    Alkaline Phosphatase 53 40 - 150 U/L    AST 33 0 - 45 U/L    ALT 19 0 - 50 U/L    Protein Total 7.0 6.4 - 8.3 g/dL    Albumin 4.2 3.5 - 5.2 g/dL    Bilirubin Total 1.7 (H) <=1.2 mg/dL   Reticulocyte count    Collection Time: 10/29/24  1:03 AM   Result Value Ref Range    % Reticulocyte 7.3 (H) 0.5 - 2.0 %    Absolute Reticulocyte 0.181 (H) 0.025 - 0.095 10e6/uL   CBC with platelets and differential    Collection Time: 10/29/24  1:03 AM   Result Value Ref Range    WBC Count 11.4 (H) 4.0 - 11.0 10e3/uL    RBC Count 2.49 (L) 3.80 - 5.20 10e6/uL    Hemoglobin 7.0 (L) 11.7 - 15.7 g/dL    Hematocrit 21.0 (L) 35.0 - 47.0 %    MCV 84 78 - 100 fL    MCH 28.1 26.5 - 33.0 pg    MCHC 33.3 31.5 - 36.5 g/dL    RDW 22.4 (H) 10.0 - 15.0 %    Platelet Count 619 (H) 150 - 450 10e3/uL    % Neutrophils 63 %    % Lymphocytes 22 %    % Monocytes 7 %    % Eosinophils 6 %    % Basophils 2 %    % Immature Granulocytes 0 %    NRBCs per 100 WBC 0 <1 /100    Absolute Neutrophils 7.1 1.6 - 8.3 10e3/uL    Absolute Lymphocytes 2.5 0.8 - 5.3 10e3/uL     Absolute Monocytes 0.8 0.0 - 1.3 10e3/uL    Absolute Eosinophils 0.7 0.0 - 0.7 10e3/uL    Absolute Basophils 0.2 0.0 - 0.2 10e3/uL    Absolute Immature Granulocytes 0.0 <=0.4 10e3/uL    Absolute NRBCs 0.0 10e3/uL   RBC and Platelet Morphology    Collection Time: 10/29/24  1:03 AM   Result Value Ref Range    RBC Morphology Confirmed RBC Indices     Platelet Assessment  Automated Count Confirmed. Platelet morphology is normal.     Automated Count Confirmed. Platelet morphology is normal.    Elliptocytes Slight (A) None Seen    Sickle Cells Slight (A) None Seen     I reviewed the above labs today.    Assessment and Plan:  1. Sickle Cell HgbSS Disease  2. Acute pain crises  3. Chronic Pain  4. 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 History of CVA  7. Hearing loss  8. Nausea/vomiting       Jennifer is seen today for routine follow-up. Her pain is pretty good today.    She is due for CT Abdomen/Pelvis and EGD for some intermittent abdominal pain, nausea and vomiting but this has been better as of late.      Regarding her sickle cell management, she continues to make an effort to reduce her oral oxycodone use at home.  The work with NP Patricia Mantilla has really paid off, as has Jennifer's own personal drive to improve her regimen. We are going to go down to 10 tabs/week. She ultimately is hoping to get to 5 tabs/week soon. As was done at previous visits, I encouraged her to try to extend the length of her prescriptions for greater than 1 week to extend the time windows.      I encouraged her to continue to attempt to decrease her ED visits as able.  For now we are continuing to limit her infusion visits at 2 times per week.  In the future we we will attempt to decrease this further although need to maintain a stepwise approach to reducing her opioid use.    There is some confusion over her iron chelation.  She remains on Jadenu and is taking this regularly.  Deferiprone was  added to her regimen earlier this year although she states she has not received this medication for a few months now.  She is due for repeat for a scan next month which is ordered for November. I added Deferiprone back as well.      -------------------------------------  Plan:  -Continue Hydrea to 3000mg daily to help lessen frequency of sickle cell pain.   -Continue monthly crizanlizumab infusions  -Continue slow taper of oxycodone. Currently receiving oxycodone 10 mg every 4 hours PRN, qty 10.    -She can self-reduce infusion days/week and we will continue to keep the cap at 2/week for now.   -Continue infusion center visits limited to two times per week (Mondays and Fridays ideally although still needs to call to request). Continue diligent home management with current medications, heat, rest, compression, warm baths.   -If unable to manage at home can go to ED  with continued plan to use IV Dilaudid and take a break from ketamine (10/2/23). No PCA use and goal for any admission would still be to discharge by 5 days or less  -EGD for evaluation of ongoing n/v and abdominal pain, scheduled in November.  CT abdomen/pelvis also due as ordered by Dr. Case.  Encourage completion of stool studies for calprotectin and H. pylori.  -Continue metoclopramide 5 mg TID PRN  - Continue Jadenu for iron overload.  Adding Deferiprone but also due for Ferriscan for iron overload monitoring.  -Continue aspirin BID  -Due at some point for Meningitis B booster.  -RTC with Patricia Mantilla on 10/31        Patricia Mantilla, CNP    ---  *** minutes spent on the date of the encounter doing {2021 E&M time in:190320}.    The longitudinal plan of care for the diagnosis(es)/condition(s) as documented were addressed during this visit. Due to the added complexity in care, I will continue to support Jennifer in the subsequent management and with ongoing continuity of care.                  Adult Sickle Cell Outpatient Visit Note  Oct 31,  2024    Reason for Visit: routine follow-up for sickle cell disease, HgbSS    History of Present Illness: Jennifer Cervantes is a 24 year old female with HgbSS complicated by frequent pain crises (acute and chronic components), history of stroke leading to significant cognitive delays and right upper extremity hemiparesis, iron overload 2/2 chronic transfusions as secondary ppx post-CVA, anxiety/depression, and asthma, She is currently on Hydrea and Jadenu. She had multiple thromboembolic events in 2021 despite adherent anticoagulation use (though warfarin was perpetually low) and there are concerns for chronic thromboembolic disease but did not have pulmonary HTN on a November 2021 cath. She is maintained on chronic PO opioids and twice-weekly infusion visits (since 1/24/22) but has been able to be maintained on this regimen and has stayed out of the ED most of the time with even rarer admissions for most of 2023. In 2024, her ED visits have increased somewhat but admissions remain rare.    Interval History:  Jennifer is seen today for routine hematology follow-up.     She recently saw Dr. Duncan on 10/21/24 at which time her oral oxycodone prescription was reduced to 10 tabs/week. Following this visit she has been treated for sickle cell pain in the ED 4 times over the past 10 days. She feels discouraged by this as it has been a goal for her to not only reduce her home oxycodone use but also minimize utilization of the ED. She recalls events leading to a few of her ED visits. Once she was experiencing nausea, vomiting and abdominal pain which led to her seeking care. A separate visit was triggered by back pain which she experienced when helping clean out the house in preparation for a likely move that she and her family will be making in the next few months.    She is doing well with her mom and sister and acknowledges expected challenges within both of these relationships. She remains busy caring for her infant niece  and her nephew. She admits this distracts her and often forces her to push through her pain.     She is still dating her boyfriend and states this is going well.    Her EGD is scheduled next week. Her mom has agreed to drive her to and from this appointment. Transportation was previously a barrier in completing this procedure.          Sickle Cell Disease Comprehensive Checklist  Bone Health/Avascular Necrosis Screening/Symptoms (each visit): no new concerns today  Leg Ulcer evaluation (every visit): nothing to note  Hypertension (every visit):stable none for several months  Last pulmonary evaluation (asthma, AMAN, pulm HTN): 9/28/22, due for follow-up  Stroke/silent cerebral infarct Hx (Y/N): Yes TIA ~2014, first event ~age 1 with full stroke and R sided weakness  Last PCP Visit: 9/30/24 with Dr. Case  Vaccines:  PCV13: 5/13/19  Pneumovax (PPSV23): 3/04, 10/09, 7/12/19, 10/2024, due in 2029  Menactra: 4/2010, 9/2015 (MCV done 8/16/21)  MenB: 9/16/15, 5/13/19 (due in 2024)  Influenza: 10/11/24  Audiology (chelation): done 2/27/24    Comparison to Previous Audiogram dated 6/6/2022:     Pure Tone Thresholds 250-8000 Hz:    RIGHT: stable    LEFT: stable     High Frequency Audiometry 9,000-16,000Hz:     RIGHT: stable    LEFT: stable     Word Recognition Score:    RIGHT: stable    LEFT: stable    Plan last reviewed with patient: 10/2/23    Patient background: 22 yo F, enjoys movies and kids though there are times where she does not really want to talk to people. Does not have a lot of social support at home.     Sickle Cell Disease History  Primary Hematologist Team: Jose Rafael Duncan  PCP: none  Genotype: SS  Acute Pain Crisis Treatment: (limiting IV)   ER   Dilaudid 1 mg IV q1h up to 3 doses  Toradol 30 mg IV x1   Maintenance IV fluids with LR  Other: Zofran 8 mg IV PRN nausea  Inpatient:  PCA Dilaudid 1 hr q30 minutes, no basal rate  Toradol 30 mg x6h x 4 hr  LR maintenance x 1-2 days  Other Medications:  Zofran  ASA  Supportive Care: Docusate, Senna  Chronic Pain Medications:    Home regimen: oxycodone 15 mg p.o. q.4-6 hours p.r.n. breakthrough pain.  She also continues on Voltaren gel, and Zoloft among other medications.    -Also benefits from mental health visits, acupuncture  Baseline Hemoglobin: 7 g/dl without chronic transfusions  Hydroxyurea use: Yes  H/O blood transfusions: Yes, several (iron overload) Most recent 11/20/2021  H/O Transfusion Reactions: no  Antibodies:none  H/O Acute Chest Syndrome: Yes  Last episode:9/05/22 (previously 4/26/21, 10/2019)   ICU/intubation: not with 9/2022 admission  H/O Stroke: Yes (managed with chronic transfusions in the past, stopped late Spring 2020)  H/O VTE: Yes (2/2021)  H/O Cholecystectomy or Splenectomy: no  H/O Asthma, Pulm HTN, AVN, Leg Ulcers, Nephropathy, Retinopathy, etc: Iron overload, asthma, chronic lung disease, physical limitations from early stroke    ---------------------------------------  Jennifer Cervantes's Goals (reviewed today 10/31/24)    1-3 month goal:  Pursue living independently in an apartment    6 month goal:      12 month goal:      Disease-specific goal(s):  Decrease frequency of ED visits. Decrease opioid qty per weekly refill. She'd like to get down to 5 tablets per week       Current Outpatient Medications   Medication Sig Dispense Refill     albuterol (PROVENTIL) (2.5 MG/3ML) 0.083% neb solution Take 2 vials (5 mg) by nebulization every 6 hours as needed for shortness of breath or wheezing. 90 mL 3     aspirin (ASA) 81 MG chewable tablet Take 1 tablet (81 mg) by mouth 2 times daily 60 tablet 11     budesonide-formoterol (SYMBICORT) 160-4.5 MCG/ACT Inhaler Inhale 2 puffs twice daily plus 1-2 puffs as needed. May use up to 12 puffs per day. 20.4 g 11     deferasirox (JADENU) 360 MG tablet Take 4 tablets (1,440 mg) by mouth daily. 120 tablet 11     Deferiprone, Twice Daily, 1000 MG TABS Take 2,000 mg by mouth 2 times daily. 150 tablet 3      diphenhydrAMINE (BENADRYL) 50 MG capsule Take 1 capsule (50 mg) by mouth every 6 hours as needed for itching or allergies. Administer 12  hours pre - IV contrast injection and patient must have a . 1 capsule 0     EPINEPHrine (ANY BX GENERIC EQUIV) 0.3 MG/0.3ML injection 2-pack Inject 0.3 mLs (0.3 mg) into the muscle as needed for anaphylaxis. 1 each 1     gabapentin (NEURONTIN) 300 MG capsule Take 1 capsule (300 mg) by mouth 3 times daily. Take PO 1 pill in evening (300 mg) TID to start. If tolerated, increase by 300 mg in morning or lunch every few days, updating your doctor's office in time to get refills. The maximum dose is 900 mg TID. (Patient taking differently: Take 600 mg by mouth at bedtime. Take PO 1 pill in evening (300 mg) TID to start. If tolerated, increase by 300 mg in morning or lunch every few days, updating your doctor's office in time to get refills. The maximum dose is 900 mg TID.) 90 capsule 1     hydroxyurea (HYDREA) 500 MG capsule Take 6 capsules (3,000 mg) by mouth daily 180 capsule 11     ibuprofen (ADVIL/MOTRIN) 800 MG tablet Take 800 mg by mouth every 8 hours as needed for moderate pain       melatonin 5 MG tablet Take 1 tablet (5 mg) by mouth nightly as needed for sleep 30 tablet 1     methocarbamol (ROBAXIN) 750 MG tablet Take 1 tablet (750 mg) by mouth 4 times daily as needed for muscle spasms (during sickle pain crises. Okay to take scheduled while in pain). 60 tablet 1     methylPREDNISolone (MEDROL) 32 MG tablet Take 1 tablet (32 mg) by mouth daily. 2 hours prior to the procedure with IV contrast 1 tablet 0     naloxone (NARCAN) 4 MG/0.1ML nasal spray Spray 4 mg into one nostril alternating nostrils as needed for opioid reversal. every 2-3 minutes until assistance arrives 0.2 mL 0     ondansetron (ZOFRAN ODT) 8 MG ODT tab Take 1 tablet (8 mg) by mouth every 8 hours as needed for nausea 40 tablet 4     oxyCODONE IR (ROXICODONE) 10 MG tablet Take 1 tablet (10 mg) by mouth every  6 hours as needed for severe pain or breakthrough pain. Goal 4 per day. Max 6 per day. 10 tablet 0     pantoprazole (PROTONIX) 40 MG EC tablet Take 1 tablet (40 mg) by mouth daily. 30 tablet 0     Past Medical History  Past Medical History:   Diagnosis Date     Anxiety      Bleeding disorder (H)      Blood clotting disorder (H)      Cerebral infarction (H) 2015     Cognitive developmental delay     low IQ. Please recognize when managing pain and planning with her     Depressive disorder      Hemiplegia and hemiparesis following cerebral infarction affecting right dominant side (H)     right hand contractures     Iron overload due to repeated red blood cell transfusions      Migraines      Multiple subsegmental pulmonary emboli without acute cor pulmonale (H) 02/01/2021     Oppositional defiant behavior      Presence of intrauterine contraceptive device 2/18/2020     Superficial venous thrombosis of arm, right 03/25/2021     Uncomplicated asthma      Past Surgical History:   Procedure Laterality Date     AS INSERT TUNNELED CV 2 CATH W/O PORT/PUMP       CHOLECYSTECTOMY       CV RIGHT HEART CATH MEASUREMENTS RECORDED N/A 11/18/2021    Procedure: Right Heart Cath;  Surgeon: Jackson Stauffer MD;  Location:  [facility]ATH [facility]     INSERT PORT VASCULAR ACCESS Left 4/21/2021    Procedure: INSERTION, VASCULAR ACCESS PORT (NOT SURE ON SIDE UNTIL REMOVAL);  Surgeon: Rajan More MD;  Location: [facility] OR     IR CHEST PORT PLACEMENT > 5 YRS OF AGE  4/21/2021     IR CVC NON TUNNEL LINE REMOVAL  5/6/2021     IR CVC NON TUNNEL PLACEMENT > 5 YRS  04/07/2020     IR CVC NON TUNNEL PLACEMENT > 5 YRS  4/30/2021     IR CVC NON TUNNEL PLACEMENT > 5 YRS  9/7/2022     IR PORT REMOVAL LEFT  4/21/2021     REMOVE PORT VASCULAR ACCESS Left 4/21/2021    Procedure: REMOVAL, VASCULAR ACCESS PORT LEFT;  Surgeon: Rajan More MD;  Location: [facility] OR     REPAIR TENDON ELBOW Right 10/02/2019    Procedure: Right Elbow Flexor Lengthening, Flexor  Pronator Slide Of Wrist and Finger, Thumb Adductor Lengthening;  Surgeon: Anai Franco MD;  Location: [facility] OR     TONSILLECTOMY Bilateral 10/02/2019    Procedure: Bilateral Tonsillectomy;  Surgeon: Farhana Guy MD;  Location: [facility] OR     Z BREAST REDUCTION (INCLUDES LIPO) TIER 3 Bilateral 04/23/2019     Allergies   Allergen Reactions     Contrast Dye Angioedema     Hives and breathing issues     Fish-Derived Products Hives     Seafood Hives     Adhesive Tape Hives     Primipore dressing causes hives     Gadolinium      Iodinated Contrast Media      Social History   Social History     Tobacco Use     Smoking status: Never     Passive exposure: Never     Smokeless tobacco: Never   Substance Use Topics     Alcohol use: Not Currently     Alcohol/week: 0.0 standard drinks of alcohol     Drug use: Never   Past medical history and social history were reviewed.    Physical Examination:  /85 (BP Location: Right arm, Patient Position: Sitting, Cuff Size: Adult Regular)   Pulse 110   Temp 99.2  F (37.3  C) (Oral)   Resp 14   Wt 71.7 kg (158 lb)   SpO2 96%   BMI 27.12 kg/m       Wt Readings from Last 10 Encounters:   10/31/24 71.7 kg (158 lb)   10/24/24 69.4 kg (153 lb)   10/23/24 70.8 kg (156 lb)   10/21/24 69.7 kg (153 lb 9.6 oz)   10/17/24 70.3 kg (155 lb)   10/17/24 70.9 kg (156 lb 3.2 oz)   10/12/24 70.3 kg (155 lb)   10/11/24 71 kg (156 lb 9.6 oz)   10/04/24 68.9 kg (152 lb)   09/26/24 69.4 kg (152 lb 14.4 oz)     General: Pleasant female, NAD  Eyes: EOMI, PERRL  Respiratory: Normal effort, no adventitious breath sounds  Ext: no peripheral edema  Neurologic: chronic contracture of right arm and wrist. Steady gait. A/O x 4.  Skin: No rashes, petechiae, or bruising noted on exposed skin.   Laboratory Data:  Recent Results (from the past 240 hours)   Comprehensive metabolic panel    Collection Time: 10/23/24 12:14 PM   Result Value Ref Range    Sodium 136 135 - 145 mmol/L    Potassium 4.2  3.4 - 5.3 mmol/L    Carbon Dioxide (CO2) 25 22 - 29 mmol/L    Anion Gap 8 7 - 15 mmol/L    Urea Nitrogen 6.8 6.0 - 20.0 mg/dL    Creatinine 0.52 0.51 - 0.95 mg/dL    GFR Estimate >90 >60 mL/min/1.73m2    Calcium 9.3 8.8 - 10.4 mg/dL    Chloride 103 98 - 107 mmol/L    Glucose 103 (H) 70 - 99 mg/dL    Alkaline Phosphatase 63 40 - 150 U/L    AST 29 0 - 45 U/L    ALT 19 0 - 50 U/L    Protein Total 8.1 6.4 - 8.3 g/dL    Albumin 4.5 3.5 - 5.2 g/dL    Bilirubin Total 1.2 <=1.2 mg/dL   Reticulocyte count    Collection Time: 10/23/24 12:14 PM   Result Value Ref Range    % Reticulocyte 10.8 (H) 0.5 - 2.0 %    Absolute Reticulocyte 0.315 (H) 0.025 - 0.095 10e6/uL   Lipase    Collection Time: 10/23/24 12:14 PM   Result Value Ref Range    Lipase 24 13 - 60 U/L   HCG qualitative Blood    Collection Time: 10/23/24 12:14 PM   Result Value Ref Range    hCG Serum Qualitative Negative Negative   CBC with platelets and differential    Collection Time: 10/23/24 12:14 PM   Result Value Ref Range    WBC Count 10.2 4.0 - 11.0 10e3/uL    RBC Count 3.03 (L) 3.80 - 5.20 10e6/uL    Hemoglobin 8.6 (L) 11.7 - 15.7 g/dL    Hematocrit 25.7 (L) 35.0 - 47.0 %    MCV 85 78 - 100 fL    MCH 28.4 26.5 - 33.0 pg    MCHC 33.5 31.5 - 36.5 g/dL    RDW 21.6 (H) 10.0 - 15.0 %    Platelet Count 736 (H) 150 - 450 10e3/uL    % Neutrophils 86 %    % Lymphocytes 8 %    % Monocytes 5 %    % Eosinophils 0 %    % Basophils 1 %    % Immature Granulocytes 0 %    NRBCs per 100 WBC 1 (H) <1 /100    Absolute Neutrophils 8.8 (H) 1.6 - 8.3 10e3/uL    Absolute Lymphocytes 0.8 0.8 - 5.3 10e3/uL    Absolute Monocytes 0.6 0.0 - 1.3 10e3/uL    Absolute Eosinophils 0.0 0.0 - 0.7 10e3/uL    Absolute Basophils 0.1 0.0 - 0.2 10e3/uL    Absolute Immature Granulocytes 0.0 <=0.4 10e3/uL    Absolute NRBCs 0.1 10e3/uL   RBC and Platelet Morphology    Collection Time: 10/23/24 12:14 PM   Result Value Ref Range    RBC Morphology Confirmed RBC Indices     Platelet Assessment  Automated  Count Confirmed. Platelet morphology is normal.     Automated Count Confirmed. Platelet morphology is normal.    Polychromasia Slight (A) None Seen    RBC Fragments Slight (A) None Seen    Sickle Cells Slight (A) None Seen    Target Cells Slight (A) None Seen   Lactic acid whole blood with 1x repeat in 2 hr when >2    Collection Time: 10/23/24 12:15 PM   Result Value Ref Range    Lactic Acid, Initial 1.1 0.7 - 2.0 mmol/L   EKG 12-lead, tracing only    Collection Time: 10/24/24 10:29 PM   Result Value Ref Range    Systolic Blood Pressure  mmHg    Diastolic Blood Pressure  mmHg    Ventricular Rate 72 BPM    Atrial Rate 72 BPM    DC Interval 154 ms    QRS Duration 76 ms     ms    QTc 442 ms    P Axis 78 degrees    R AXIS 46 degrees    T Axis 32 degrees    Interpretation ECG       Sinus rhythm  Normal ECG  Unconfirmed report - interpretation of this ECG is computer generated - see medical record for final interpretation    Confirmed by - EMERGENCY ROOM, PHYSICIAN ([id number]),  KRISHNA GAMBLE ([id number]) on 10/25/2024 3:35:37 PM     INR    Collection Time: 10/24/24 11:01 PM   Result Value Ref Range    INR 1.14 0.85 - 1.15   Comprehensive metabolic panel    Collection Time: 10/24/24 11:01 PM   Result Value Ref Range    Sodium 139 135 - 145 mmol/L    Potassium 3.7 3.4 - 5.3 mmol/L    Carbon Dioxide (CO2) 26 22 - 29 mmol/L    Anion Gap 7 7 - 15 mmol/L    Urea Nitrogen 8.9 6.0 - 20.0 mg/dL    Creatinine 0.70 0.51 - 0.95 mg/dL    GFR Estimate >90 >60 mL/min/1.73m2    Calcium 8.6 (L) 8.8 - 10.4 mg/dL    Chloride 106 98 - 107 mmol/L    Glucose 87 70 - 99 mg/dL    Alkaline Phosphatase 54 40 - 150 U/L    AST 30 0 - 45 U/L    ALT 22 0 - 50 U/L    Protein Total 7.0 6.4 - 8.3 g/dL    Albumin 4.0 3.5 - 5.2 g/dL    Bilirubin Total 1.4 (H) <=1.2 mg/dL   Lipase    Collection Time: 10/24/24 11:01 PM   Result Value Ref Range    Lipase 29 13 - 60 U/L   Troponin T, High Sensitivity    Collection Time: 10/24/24 11:01 PM   Result Value Ref  Range    Troponin T, High Sensitivity <6 <=14 ng/L   HCG qualitative Blood    Collection Time: 10/24/24 11:01 PM   Result Value Ref Range    hCG Serum Qualitative Negative Negative   CBC with platelets and differential    Collection Time: 10/24/24 11:01 PM   Result Value Ref Range    WBC Count 11.8 (H) 4.0 - 11.0 10e3/uL    RBC Count 2.72 (L) 3.80 - 5.20 10e6/uL    Hemoglobin 8.0 (L) 11.7 - 15.7 g/dL    Hematocrit 23.7 (L) 35.0 - 47.0 %    MCV 87 78 - 100 fL    MCH 29.4 26.5 - 33.0 pg    MCHC 33.8 31.5 - 36.5 g/dL    RDW 21.1 (H) 10.0 - 15.0 %    Platelet Count 700 (H) 150 - 450 10e3/uL    % Neutrophils 66 %    % Lymphocytes 25 %    % Monocytes 6 %    % Eosinophils 2 %    % Basophils 2 %    % Immature Granulocytes 0 %    NRBCs per 100 WBC 0 <1 /100    Absolute Neutrophils 7.8 1.6 - 8.3 10e3/uL    Absolute Lymphocytes 2.9 0.8 - 5.3 10e3/uL    Absolute Monocytes 0.7 0.0 - 1.3 10e3/uL    Absolute Eosinophils 0.2 0.0 - 0.7 10e3/uL    Absolute Basophils 0.2 0.0 - 0.2 10e3/uL    Absolute Immature Granulocytes 0.0 <=0.4 10e3/uL    Absolute NRBCs 0.0 10e3/uL   Reticulocyte count    Collection Time: 10/24/24 11:01 PM   Result Value Ref Range    % Reticulocyte 7.7 (H) 0.5 - 2.0 %    Absolute Reticulocyte 0.232 (H) 0.025 - 0.095 10e6/uL   HCG quantitative pregnancy (blood)    Collection Time: 10/24/24 11:01 PM   Result Value Ref Range    hCG Quantitative <1 <5 mIU/mL   Basic metabolic panel    Collection Time: 10/27/24  3:40 AM   Result Value Ref Range    Sodium 141 135 - 145 mmol/L    Potassium 4.0 3.4 - 5.3 mmol/L    Chloride 106 98 - 107 mmol/L    Carbon Dioxide (CO2) 25 22 - 29 mmol/L    Anion Gap 10 7 - 15 mmol/L    Urea Nitrogen 8.1 6.0 - 20.0 mg/dL    Creatinine 0.53 0.51 - 0.95 mg/dL    GFR Estimate >90 >60 mL/min/1.73m2    Calcium 9.0 8.8 - 10.4 mg/dL    Glucose 94 70 - 99 mg/dL   Reticulocyte count    Collection Time: 10/27/24  3:40 AM   Result Value Ref Range    % Reticulocyte 5.4 (H) 0.5 - 2.0 %    Absolute  Reticulocyte 0.156 (H) 0.025 - 0.095 10e6/uL   CBC with platelets and differential    Collection Time: 10/27/24  3:40 AM   Result Value Ref Range    WBC Count 9.8 4.0 - 11.0 10e3/uL    RBC Count 2.94 (L) 3.80 - 5.20 10e6/uL    Hemoglobin 8.2 (L) 11.7 - 15.7 g/dL    Hematocrit 24.5 (L) 35.0 - 47.0 %    MCV 83 78 - 100 fL    MCH 27.9 26.5 - 33.0 pg    MCHC 33.5 31.5 - 36.5 g/dL    RDW 21.0 (H) 10.0 - 15.0 %    Platelet Count 711 (H) 150 - 450 10e3/uL    % Neutrophils 61 %    % Lymphocytes 24 %    % Monocytes 8 %    % Eosinophils 4 %    % Basophils 2 %    % Immature Granulocytes 1 %    NRBCs per 100 WBC 0 <1 /100    Absolute Neutrophils 6.0 1.6 - 8.3 10e3/uL    Absolute Lymphocytes 2.4 0.8 - 5.3 10e3/uL    Absolute Monocytes 0.7 0.0 - 1.3 10e3/uL    Absolute Eosinophils 0.4 0.0 - 0.7 10e3/uL    Absolute Basophils 0.2 0.0 - 0.2 10e3/uL    Absolute Immature Granulocytes 0.1 <=0.4 10e3/uL    Absolute NRBCs 0.0 10e3/uL   RBC and Platelet Morphology    Collection Time: 10/27/24  3:40 AM   Result Value Ref Range    RBC Morphology Confirmed RBC Indices     Platelet Assessment  Automated Count Confirmed. Platelet morphology is normal.     Automated Count Confirmed. Platelet morphology is normal.    Sickle Cells Slight (A) None Seen    Stomatocytes Slight (A) None Seen    Target Cells Slight (A) None Seen    Teardrop Cells Slight (A) None Seen   Comprehensive metabolic panel    Collection Time: 10/29/24  1:03 AM   Result Value Ref Range    Sodium 138 135 - 145 mmol/L    Potassium 3.9 3.4 - 5.3 mmol/L    Carbon Dioxide (CO2) 23 22 - 29 mmol/L    Anion Gap 12 7 - 15 mmol/L    Urea Nitrogen 8.0 6.0 - 20.0 mg/dL    Creatinine 0.59 0.51 - 0.95 mg/dL    GFR Estimate >90 >60 mL/min/1.73m2    Calcium 8.9 8.8 - 10.4 mg/dL    Chloride 103 98 - 107 mmol/L    Glucose 88 70 - 99 mg/dL    Alkaline Phosphatase 53 40 - 150 U/L    AST 33 0 - 45 U/L    ALT 19 0 - 50 U/L    Protein Total 7.0 6.4 - 8.3 g/dL    Albumin 4.2 3.5 - 5.2 g/dL     Bilirubin Total 1.7 (H) <=1.2 mg/dL   Reticulocyte count    Collection Time: 10/29/24  1:03 AM   Result Value Ref Range    % Reticulocyte 7.3 (H) 0.5 - 2.0 %    Absolute Reticulocyte 0.181 (H) 0.025 - 0.095 10e6/uL   CBC with platelets and differential    Collection Time: 10/29/24  1:03 AM   Result Value Ref Range    WBC Count 11.4 (H) 4.0 - 11.0 10e3/uL    RBC Count 2.49 (L) 3.80 - 5.20 10e6/uL    Hemoglobin 7.0 (L) 11.7 - 15.7 g/dL    Hematocrit 21.0 (L) 35.0 - 47.0 %    MCV 84 78 - 100 fL    MCH 28.1 26.5 - 33.0 pg    MCHC 33.3 31.5 - 36.5 g/dL    RDW 22.4 (H) 10.0 - 15.0 %    Platelet Count 619 (H) 150 - 450 10e3/uL    % Neutrophils 63 %    % Lymphocytes 22 %    % Monocytes 7 %    % Eosinophils 6 %    % Basophils 2 %    % Immature Granulocytes 0 %    NRBCs per 100 WBC 0 <1 /100    Absolute Neutrophils 7.1 1.6 - 8.3 10e3/uL    Absolute Lymphocytes 2.5 0.8 - 5.3 10e3/uL    Absolute Monocytes 0.8 0.0 - 1.3 10e3/uL    Absolute Eosinophils 0.7 0.0 - 0.7 10e3/uL    Absolute Basophils 0.2 0.0 - 0.2 10e3/uL    Absolute Immature Granulocytes 0.0 <=0.4 10e3/uL    Absolute NRBCs 0.0 10e3/uL   RBC and Platelet Morphology    Collection Time: 10/29/24  1:03 AM   Result Value Ref Range    RBC Morphology Confirmed RBC Indices     Platelet Assessment  Automated Count Confirmed. Platelet morphology is normal.     Automated Count Confirmed. Platelet morphology is normal.    Elliptocytes Slight (A) None Seen    Sickle Cells Slight (A) None Seen   Comprehensive metabolic panel    Collection Time: 10/31/24  6:38 PM   Result Value Ref Range    Sodium 137 135 - 145 mmol/L    Potassium 3.9 3.4 - 5.3 mmol/L    Carbon Dioxide (CO2) 23 22 - 29 mmol/L    Anion Gap 11 7 - 15 mmol/L    Urea Nitrogen 6.6 6.0 - 20.0 mg/dL    Creatinine 0.52 0.51 - 0.95 mg/dL    GFR Estimate >90 >60 mL/min/1.73m2    Calcium 9.0 8.8 - 10.4 mg/dL    Chloride 103 98 - 107 mmol/L    Glucose 90 70 - 99 mg/dL    Alkaline Phosphatase 64 40 - 150 U/L    AST 35 0 - 45  U/L    ALT 22 0 - 50 U/L    Protein Total 7.9 6.4 - 8.3 g/dL    Albumin 4.7 3.5 - 5.2 g/dL    Bilirubin Total 1.8 (H) <=1.2 mg/dL   Reticulocyte count    Collection Time: 10/31/24  6:38 PM   Result Value Ref Range    % Reticulocyte 10.9 (H) 0.5 - 2.0 %    Absolute Reticulocyte 0.144 (H) 0.025 - 0.095 10e6/uL   CBC with platelets and differential    Collection Time: 10/31/24  6:38 PM   Result Value Ref Range    WBC Count 10.5 4.0 - 11.0 10e3/uL    RBC Count 2.72 (L) 3.80 - 5.20 10e6/uL    Hemoglobin 7.9 (L) 11.7 - 15.7 g/dL    Hematocrit 23.2 (L) 35.0 - 47.0 %    MCV 85 78 - 100 fL    MCH 29.0 26.5 - 33.0 pg    MCHC 34.1 31.5 - 36.5 g/dL    RDW 22.2 (H) 10.0 - 15.0 %    Platelet Count 605 (H) 150 - 450 10e3/uL    % Neutrophils 68 %    % Lymphocytes 19 %    % Monocytes 6 %    % Eosinophils 4 %    % Basophils 2 %    % Immature Granulocytes 0 %    NRBCs per 100 WBC 1 (H) <1 /100    Absolute Neutrophils 7.2 1.6 - 8.3 10e3/uL    Absolute Lymphocytes 2.0 0.8 - 5.3 10e3/uL    Absolute Monocytes 0.6 0.0 - 1.3 10e3/uL    Absolute Eosinophils 0.4 0.0 - 0.7 10e3/uL    Absolute Basophils 0.2 0.0 - 0.2 10e3/uL    Absolute Immature Granulocytes 0.0 <=0.4 10e3/uL    Absolute NRBCs 0.1 10e3/uL     I reviewed the above labs today.    Assessment and Plan:  1. Sickle Cell HgbSS Disease  2. Acute pain crises  3. Chronic Pain  4. 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 History of CVA  7. Hearing loss  8. Nausea/vomiting       Jennifer is seen today for routine follow-up. She reports increased pain today and may pursue care in the ED following our visit. Her use of the ED has increased over the past few weeks although she is very aware of this and continues to make efforts to reduce ED visits. Her oxycodone prescription was recently decreased from 15 to 10 tablets/week. It is very apparent she is making an effort to meet her goals. She verbalizes clear understanding that if she increases  use of IV opioids while decreasing oral opioid use, this negates efforts to reduce overall opioids.     She is due for CT Abdomen/Pelvis tomorrow as ordered by Dr. Case. and EGD for some intermittent abdominal pain, nausea and vomiting. I do agree we should proc  Regarding her sickle cell management, she continues to make an effort to reduce her oral oxycodone use at home.  The work with NP Patricia Khalilmaria eugenia has really paid off, as has Jennifer's own personal drive to improve her regimen. We are going to go down to 10 tabs/week. She ultimately is hoping to get to 5 tabs/week soon. As was done at previous visits, I encouraged her to try to extend the length of her prescriptions for greater than 1 week to extend the time windows.      I encouraged her to continue to attempt to decrease her ED visits as able.  For now we are continuing to limit her infusion visits at 2 times per week.  In the future we we will attempt to decrease this further although need to maintain a stepwise approach to reducing her opioid use.    There is some confusion over her iron chelation.  She remains on Jadenu and is taking this regularly.  Deferiprone was added to her regimen earlier this year although she states she has not received this medication for a few months now.  She is due for repeat for a scan next month which is ordered for November. I added Deferiprone back as well.      -------------------------------------  Plan:  -Continue Hydrea to 3000mg daily to help lessen frequency of sickle cell pain.   -Continue monthly crizanlizumab infusions  -Continue slow taper of oxycodone. Currently receiving oxycodone 10 mg every 4 hours PRN, qty 10.    -She can self-reduce infusion days/week and we will continue to keep the cap at 2/week for now.   -Continue infusion center visits limited to two times per week (Mondays and Fridays ideally although still needs to call to request). Continue diligent home management with current medications, heat,  rest, compression, warm baths.   -If unable to manage at home can go to ED  with continued plan to use IV Dilaudid and take a break from ketamine (10/2/23). No PCA use and goal for any admission would still be to discharge by 5 days or less  -EGD for evaluation of ongoing n/v and abdominal pain, scheduled in November.  CT abdomen/pelvis also due as ordered by Dr. Case.  Encourage completion of stool studies for calprotectin and H. pylori.  -Continue metoclopramide 5 mg TID PRN  - Continue Jadenu for iron overload.  Adding Deferiprone but also due for Ferriscan for iron overload monitoring.  -Continue aspirin BID  -Due at some point for Meningitis B booster.  -RTC with Patricia Mantilla on 10/31        Patricia Mantilla CNP    ---  *** minutes spent on the date of the encounter doing {2021 E&M time in:228380}.    The longitudinal plan of care for the diagnosis(es)/condition(s) as documented were addressed during this visit. Due to the added complexity in care, I will continue to support Jennifer in the subsequent management and with ongoing continuity of care.                    Again, thank you for allowing me to participate in the care of your patient.        Sincerely,        Patricia Mantilla CNP

## 2024-10-31 NOTE — NURSING NOTE
"Oncology Rooming Note    October 31, 2024 2:48 PM   Jennifer Cervantes is a 24 year old female who presents for:    Chief Complaint   Patient presents with    Oncology Clinic Visit     Sickle cell anemia     Initial Vitals: /85 (BP Location: Right arm, Patient Position: Sitting, Cuff Size: Adult Regular)   Pulse 110   Temp 99.2  F (37.3  C) (Oral)   Resp 14   Wt 71.7 kg (158 lb)   SpO2 96%   BMI 27.12 kg/m   Estimated body mass index is 27.12 kg/m  as calculated from the following:    Height as of 10/24/24: 1.626 m (5' 4\").    Weight as of this encounter: 71.7 kg (158 lb). Body surface area is 1.8 meters squared.  Severe Pain (7) Comment: Data Unavailable   No LMP recorded. Patient has had an implant.  Allergies reviewed: Yes  Medications reviewed: Yes    Medications: Medication refills not needed today.  Pharmacy name entered into EPIC:    [facility] [place], [place] - [address] [place] [address] [phone]  [facility] - [place], [place] - [address] [place] [address]    Frailty Screening:   Is the patient here for a new oncology consult visit in cancer care? 2. No      Clinical concerns: Pt reports back pain today. Patricia was notified via message.       Libertad Sy, EMT     "

## 2024-10-31 NOTE — ED PROVIDER NOTES
[facility] [place] EMERGENCY DEPARTMENT ([facility])    10/31/24      ED PROVIDER NOTE    History     Chief Complaint   Patient presents with    Sickle Cell Pain Crisis     Patient states to have had a few appointments today, and the weather has messed with her body. States to be in sickle cell crisis with generalized pain all over.        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  States that the pain is particularly in her back and extremities.  She states that she had a number of appointments today and was out in the cold, rain, and snow.  She denies any fever.  No chest pain or dyspnea.  No abdominal pain.  No nausea or vomiting.  She states she tried ibuprofen at home without improvement in her symptoms.    Past Medical History  Past Medical History:   Diagnosis Date    Anxiety     Bleeding disorder (H)     Blood clotting disorder (H)     Cerebral infarction (H) 2015    Cognitive developmental delay     low IQ. 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 side (H)     right hand contractures    Iron overload due to repeated red blood cell transfusions     Migraines     Multiple subsegmental pulmonary emboli without acute cor pulmonale (H) 02/01/2021    Oppositional defiant behavior     Presence of intrauterine contraceptive device 2/18/2020    Superficial venous thrombosis of arm, right 03/25/2021    Uncomplicated asthma      Past Surgical History:   Procedure Laterality Date    AS INSERT TUNNELED CV 2 CATH W/O PORT/PUMP       CHOLECYSTECTOMY      CV RIGHT HEART CATH MEASUREMENTS RECORDED N/A 11/18/2021    Procedure: Right Heart Cath;  Surgeon: Jackson Stauffer MD;  Location:  HEART CARDIAC CATH LAB    INSERT PORT VASCULAR ACCESS Left 4/21/2021    Procedure: INSERTION, VASCULAR ACCESS PORT (NOT SURE ON SIDE UNTIL REMOVAL);  Surgeon: Rajan More MD;  Location: [facility] OR    IR CHEST PORT PLACEMENT > 5 YRS OF AGE  4/21/2021    IR CVC NON TUNNEL LINE REMOVAL  5/6/2021    IR CVC NON TUNNEL PLACEMENT > 5 YRS  04/07/2020    IR CVC NON TUNNEL PLACEMENT > 5 YRS  4/30/2021    IR CVC NON TUNNEL PLACEMENT > 5 YRS  9/7/2022    IR PORT REMOVAL LEFT  4/21/2021    REMOVE PORT VASCULAR ACCESS Left 4/21/2021    Procedure: REMOVAL, VASCULAR ACCESS PORT LEFT;  Surgeon: Rajan More MD;  Location: [facility] OR    REPAIR TENDON ELBOW Right 10/02/2019    Procedure: Right Elbow Flexor Lengthening, Flexor Pronator Slide Of Wrist and Finger, Thumb Adductor Lengthening;  Surgeon: Anai Franco MD;  Location: [facility] OR    TONSILLECTOMY Bilateral 10/02/2019    Procedure: Bilateral Tonsillectomy;  Surgeon: Farhana Guy MD;  Location: [facility] OR    ZZC BREAST REDUCTION (INCLUDES LIPO) TIER 3 Bilateral 04/23/2019     albuterol (PROVENTIL) (2.5 MG/3ML) 0.083% neb solution  aspirin (ASA) 81 MG chewable tablet  budesonide-formoterol (SYMBICORT) 160-4.5 MCG/ACT Inhaler  deferasirox (JADENU) 360 MG tablet  Deferiprone, Twice Daily, 1000 MG TABS  diphenhydrAMINE (BENADRYL) 50 MG capsule  EPINEPHrine (ANY BX GENERIC EQUIV) 0.3 MG/0.3ML injection 2-pack  gabapentin (NEURONTIN) 300 MG capsule  hydroxyurea (HYDREA) 500 MG capsule  ibuprofen (ADVIL/MOTRIN) 800 MG tablet  melatonin 5 MG tablet  methocarbamol (ROBAXIN) 750 MG tablet  methylPREDNISolone (MEDROL) 32 MG tablet  naloxone (NARCAN) 4 MG/0.1ML nasal spray  ondansetron (ZOFRAN ODT) 8 MG ODT tab  oxyCODONE IR (ROXICODONE) 10 MG tablet  pantoprazole (PROTONIX) 40 MG EC tablet      Allergies   Allergen  Reactions    Contrast Dye Angioedema     Hives and breathing issues    Fish-Derived Products Hives    Seafood Hives    Adhesive Tape Hives     Primipore dressing causes hives    Gadolinium     Iodinated Contrast Media      Family History  Family History   Problem Relation Age of Onset    Sickle Cell Trait Mother     Hypertension Mother     Asthma Mother     Sickle Cell Trait Father     Glaucoma No family hx of     Macular Degeneration No family hx of     Diabetes No family hx of     Gout No family hx of      Social History   Social History     Tobacco Use    Smoking status: Never     Passive exposure: Never    Smokeless tobacco: Never   Substance Use Topics    Alcohol use: Not Currently     Alcohol/week: 0.0 standard drinks of alcohol    Drug use: Never      Past medical history, past surgical history, medications, allergies, family history, and social history were reviewed with the patient. No additional pertinent items.     A medically appropriate review of systems was performed with pertinent positives and negatives noted in the HPI, and all other systems negative.    Physical Exam   BP: 121/77  Pulse: 90  Temp: 98.7  F (37.1  C)  Resp: 18  SpO2: 100 %  Physical Exam  Vitals and nursing note reviewed.   Constitutional:       General: She is not in acute distress.     Appearance: She is not diaphoretic.   HENT:      Head: Normocephalic and atraumatic.   Eyes:      Extraocular Movements: Extraocular movements intact.      Conjunctiva/sclera: Conjunctivae normal.   Cardiovascular:      Rate and Rhythm: Normal rate.      Heart sounds: Normal heart sounds.   Pulmonary:      Effort: Pulmonary effort is normal. No respiratory distress.      Breath sounds: Normal breath sounds.   Abdominal:      Palpations: Abdomen is soft.      Tenderness: There is no abdominal tenderness.   Musculoskeletal:         General: No tenderness. Normal range of motion.      Cervical back: Normal range of motion and neck supple.   Skin:      General: Skin is warm.      Findings: No rash.   Neurological:      Mental Status: Mental status is at baseline.      Comments: Right hemiplegia           ED Course, Procedures, & Data      Reviewed hematology visit from earlier today.  Reviewed Emergency Department encounter on 10/28/2024 and 10/27/2024 for sickle cell pain crisis.  Reviewed previous CBC, comments of metabolic panel, reticulocyte count.  Reviewed patient's emergency department care plan.  Procedures             Results for orders placed or performed during the hospital encounter of 10/31/24   Comprehensive metabolic panel     Status: Abnormal   Result Value Ref Range    Sodium 137 135 - 145 mmol/L    Potassium 3.9 3.4 - 5.3 mmol/L    Carbon Dioxide (CO2) 23 22 - 29 mmol/L    Anion Gap 11 7 - 15 mmol/L    Urea Nitrogen 6.6 6.0 - 20.0 mg/dL    Creatinine 0.52 0.51 - 0.95 mg/dL    GFR Estimate >90 >60 mL/min/1.73m2    Calcium 9.0 8.8 - 10.4 mg/dL    Chloride 103 98 - 107 mmol/L    Glucose 90 70 - 99 mg/dL    Alkaline Phosphatase 64 40 - 150 U/L    AST 35 0 - 45 U/L    ALT 22 0 - 50 U/L    Protein Total 7.9 6.4 - 8.3 g/dL    Albumin 4.7 3.5 - 5.2 g/dL    Bilirubin Total 1.8 (H) <=1.2 mg/dL   Reticulocyte count     Status: Abnormal   Result Value Ref Range    % Reticulocyte 10.9 (H) 0.5 - 2.0 %    Absolute Reticulocyte 0.144 (H) 0.025 - 0.095 10e6/uL   CBC with platelets and differential     Status: Abnormal   Result Value Ref Range    WBC Count 10.5 4.0 - 11.0 10e3/uL    RBC Count 2.72 (L) 3.80 - 5.20 10e6/uL    Hemoglobin 7.9 (L) 11.7 - 15.7 g/dL    Hematocrit 23.2 (L) 35.0 - 47.0 %    MCV 85 78 - 100 fL    MCH 29.0 26.5 - 33.0 pg    MCHC 34.1 31.5 - 36.5 g/dL    RDW 22.2 (H) 10.0 - 15.0 %    Platelet Count 605 (H) 150 - 450 10e3/uL    % Neutrophils 68 %    % Lymphocytes 19 %    % Monocytes 6 %    % Eosinophils 4 %    % Basophils 2 %    % Immature Granulocytes 0 %    NRBCs per 100 WBC 1 (H) <1 /100    Absolute Neutrophils 7.2 1.6 - 8.3 10e3/uL     Absolute Lymphocytes 2.0 0.8 - 5.3 10e3/uL    Absolute Monocytes 0.6 0.0 - 1.3 10e3/uL    Absolute Eosinophils 0.4 0.0 - 0.7 10e3/uL    Absolute Basophils 0.2 0.0 - 0.2 10e3/uL    Absolute Immature Granulocytes 0.0 <=0.4 10e3/uL    Absolute NRBCs 0.1 10e3/uL   CBC with platelets differential     Status: Abnormal    Narrative    The following orders were created for panel order CBC with platelets differential.  Procedure                               Abnormality         Status                     ---------                               -----------         ------                     CBC with platelets and d...[[id number]]  Abnormal            Final result                 Please view results for these tests on the individual orders.         Results for orders placed or performed during the hospital encounter of 10/31/24   Comprehensive metabolic panel     Status: Abnormal   Result Value Ref Range    Sodium 137 135 - 145 mmol/L    Potassium 3.9 3.4 - 5.3 mmol/L    Carbon Dioxide (CO2) 23 22 - 29 mmol/L    Anion Gap 11 7 - 15 mmol/L    Urea Nitrogen 6.6 6.0 - 20.0 mg/dL    Creatinine 0.52 0.51 - 0.95 mg/dL    GFR Estimate >90 >60 mL/min/1.73m2    Calcium 9.0 8.8 - 10.4 mg/dL    Chloride 103 98 - 107 mmol/L    Glucose 90 70 - 99 mg/dL    Alkaline Phosphatase 64 40 - 150 U/L    AST 35 0 - 45 U/L    ALT 22 0 - 50 U/L    Protein Total 7.9 6.4 - 8.3 g/dL    Albumin 4.7 3.5 - 5.2 g/dL    Bilirubin Total 1.8 (H) <=1.2 mg/dL   Reticulocyte count     Status: Abnormal   Result Value Ref Range    % Reticulocyte 10.9 (H) 0.5 - 2.0 %    Absolute Reticulocyte 0.144 (H) 0.025 - 0.095 10e6/uL   CBC with platelets and differential     Status: Abnormal   Result Value Ref Range    WBC Count 10.5 4.0 - 11.0 10e3/uL    RBC Count 2.72 (L) 3.80 - 5.20 10e6/uL    Hemoglobin 7.9 (L) 11.7 - 15.7 g/dL    Hematocrit 23.2 (L) 35.0 - 47.0 %    MCV 85 78 - 100 fL    MCH 29.0 26.5 - 33.0 pg    MCHC 34.1 31.5 - 36.5 g/dL    RDW 22.2 (H) 10.0 - 15.0 %     Platelet Count 605 (H) 150 - 450 10e3/uL    % Neutrophils 68 %    % Lymphocytes 19 %    % Monocytes 6 %    % Eosinophils 4 %    % Basophils 2 %    % Immature Granulocytes 0 %    NRBCs per 100 WBC 1 (H) <1 /100    Absolute Neutrophils 7.2 1.6 - 8.3 10e3/uL    Absolute Lymphocytes 2.0 0.8 - 5.3 10e3/uL    Absolute Monocytes 0.6 0.0 - 1.3 10e3/uL    Absolute Eosinophils 0.4 0.0 - 0.7 10e3/uL    Absolute Basophils 0.2 0.0 - 0.2 10e3/uL    Absolute Immature Granulocytes 0.0 <=0.4 10e3/uL    Absolute NRBCs 0.1 10e3/uL   CBC with platelets differential     Status: Abnormal    Narrative    The following orders were created for panel order CBC with platelets differential.  Procedure                               Abnormality         Status                     ---------                               -----------         ------                     CBC with platelets and d...[[id number]]  Abnormal            Final result                 Please view results for these tests on the individual orders.     Medications   ketorolac (TORADOL) injection 15 mg (15 mg Intravenous $Given 10/31/24 1852)   HYDROmorphone (DILAUDID) injection 1 mg (1 mg Intravenous $Given 10/31/24 2131)   ondansetron (ZOFRAN) injection 8 mg (8 mg Intravenous $Given 10/31/24 1848)     Labs Ordered and Resulted from Time of ED Arrival to Time of ED Departure   COMPREHENSIVE METABOLIC PANEL - Abnormal       Result Value    Sodium 137      Potassium 3.9      Carbon Dioxide (CO2) 23      Anion Gap 11      Urea Nitrogen 6.6      Creatinine 0.52      GFR Estimate >90      Calcium 9.0      Chloride 103      Glucose 90      Alkaline Phosphatase 64      AST 35      ALT 22      Protein Total 7.9      Albumin 4.7      Bilirubin Total 1.8 (*)    RETICULOCYTE COUNT - Abnormal    % Reticulocyte 10.9 (*)     Absolute Reticulocyte 0.144 (*)    CBC WITH PLATELETS AND DIFFERENTIAL - Abnormal    WBC Count 10.5      RBC Count 2.72 (*)     Hemoglobin 7.9 (*)     Hematocrit 23.2 (*)      MCV 85      MCH 29.0      MCHC 34.1      RDW 22.2 (*)     Platelet Count 605 (*)     % Neutrophils 68      % Lymphocytes 19      % Monocytes 6      % Eosinophils 4      % Basophils 2      % Immature Granulocytes 0      NRBCs per 100 WBC 1 (*)     Absolute Neutrophils 7.2      Absolute Lymphocytes 2.0      Absolute Monocytes 0.6      Absolute Eosinophils 0.4      Absolute Basophils 0.2      Absolute Immature Granulocytes 0.0      Absolute NRBCs 0.1       No orders to display          Critical care was not performed.     Medical Decision Making  The patient's presentation was of moderate complexity (a chronic illness mild to moderate exacerbation, progression, or side effect of treatment).    The patient's evaluation involved:  review of external note(s) from 3+ sources (see separate area of note for details)  review of 3+ test result(s) ordered prior to this encounter (see separate area of note for details)  ordering and/or review of 3+ test(s) in this encounter (see separate area of note for details)    The patient's management necessitated high risk (a parenteral controlled substance).    Assessment & Plan    24 year old male with history of sickle cell disease to the emergency department with sickle cell pain crisis after being outside in the cold, rain, and snow today going to various doctors appointments.  Patient complains of primarily back and extremity pain.  She has normal vital signs.  She appears clinically well.  She was treated in accordance with her emergency department sickle cell pain care plan with good control of her symptoms.  Her labs are baseline.  She appears clinically well and appropriate for outpatient management.  Return precautions provided.    I have reviewed the nursing notes. I have reviewed the findings, diagnosis, plan and need for follow up with the patient.    Discharge Medication List as of 10/31/2024 10:40 PM          Final diagnoses:   Sickle cell pain crisis (H)     Chart  documentation was completed with Dragon voice-recognition software. Even though reviewed, this chart may still contain some grammatical, spelling, and word errors.     [facility] EMERGENCY DEPARTMENT  10/31/2024     Francesco Green MD  11/01/24 0103

## 2024-11-01 ENCOUNTER — TELEPHONE (OUTPATIENT)
Dept: INTERNAL MEDICINE | Facility: CLINIC | Age: 24
End: 2024-11-01
Payer: COMMERCIAL

## 2024-11-01 ENCOUNTER — APPOINTMENT (OUTPATIENT)
Dept: GENERAL RADIOLOGY | Facility: CLINIC | Age: 24
End: 2024-11-01
Attending: INTERNAL MEDICINE
Payer: COMMERCIAL

## 2024-11-01 ENCOUNTER — HOSPITAL ENCOUNTER (EMERGENCY)
Facility: CLINIC | Age: 24
Discharge: HOME | End: 2024-11-02
Attending: INTERNAL MEDICINE | Admitting: INTERNAL MEDICINE
Payer: COMMERCIAL

## 2024-11-01 DIAGNOSIS — D57.00 SICKLE CELL PAIN CRISIS (H): ICD-10-CM

## 2024-11-01 LAB
ALBUMIN SERPL BCG-MCNC: 4.4 G/DL (ref 3.5–5.2)
ALP SERPL-CCNC: 58 U/L (ref 40–150)
ALT SERPL W P-5'-P-CCNC: 23 U/L (ref 0–50)
ANION GAP SERPL CALCULATED.3IONS-SCNC: 12 MMOL/L (ref 7–15)
AST SERPL W P-5'-P-CCNC: 30 U/L (ref 0–45)
BASOPHILS # BLD AUTO: 0.2 10E3/UL (ref 0–0.2)
BASOPHILS NFR BLD AUTO: 1 %
BILIRUB SERPL-MCNC: 1.8 MG/DL
BUN SERPL-MCNC: 8.3 MG/DL (ref 6–20)
CALCIUM SERPL-MCNC: 9 MG/DL (ref 8.8–10.4)
CHLORIDE SERPL-SCNC: 102 MMOL/L (ref 98–107)
CREAT SERPL-MCNC: 0.69 MG/DL (ref 0.51–0.95)
EGFRCR SERPLBLD CKD-EPI 2021: >90 ML/MIN/1.73M2
ELLIPTOCYTES BLD QL SMEAR: SLIGHT
EOSINOPHIL # BLD AUTO: 0.1 10E3/UL (ref 0–0.7)
EOSINOPHIL NFR BLD AUTO: 1 %
ERYTHROCYTE [DISTWIDTH] IN BLOOD BY AUTOMATED COUNT: 22.5 % (ref 10–15)
FRAGMENTS BLD QL SMEAR: SLIGHT
GLUCOSE SERPL-MCNC: 96 MG/DL (ref 70–99)
HCG SERPL QL: NEGATIVE
HCO3 SERPL-SCNC: 24 MMOL/L (ref 22–29)
HCT VFR BLD AUTO: 22.3 % (ref 35–47)
HGB BLD-MCNC: 7.6 G/DL (ref 11.7–15.7)
IMM GRANULOCYTES # BLD: 0.1 10E3/UL
IMM GRANULOCYTES NFR BLD: 1 %
LYMPHOCYTES # BLD AUTO: 2.6 10E3/UL (ref 0.8–5.3)
LYMPHOCYTES NFR BLD AUTO: 15 %
MCH RBC QN AUTO: 28.8 PG (ref 26.5–33)
MCHC RBC AUTO-ENTMCNC: 34.1 G/DL (ref 31.5–36.5)
MCV RBC AUTO: 85 FL (ref 78–100)
MONOCYTES # BLD AUTO: 1.4 10E3/UL (ref 0–1.3)
MONOCYTES NFR BLD AUTO: 8 %
NEUTROPHILS # BLD AUTO: 13.2 10E3/UL (ref 1.6–8.3)
NEUTROPHILS NFR BLD AUTO: 75 %
NRBC # BLD AUTO: 0.1 10E3/UL
NRBC BLD AUTO-RTO: 1 /100
PLAT MORPH BLD: ABNORMAL
PLATELET # BLD AUTO: 625 10E3/UL (ref 150–450)
POTASSIUM SERPL-SCNC: 3.6 MMOL/L (ref 3.4–5.3)
PROT SERPL-MCNC: 7.6 G/DL (ref 6.4–8.3)
RBC # BLD AUTO: 2.64 10E6/UL (ref 3.8–5.2)
RBC MORPH BLD: ABNORMAL
RETICS # AUTO: 0.33 10E6/UL (ref 0.03–0.1)
RETICS/RBC NFR AUTO: 12.4 % (ref 0.5–2)
SODIUM SERPL-SCNC: 138 MMOL/L (ref 135–145)
WBC # BLD AUTO: 17.4 10E3/UL (ref 4–11)

## 2024-11-01 PROCEDURE — 84703 CHORIONIC GONADOTROPIN ASSAY: CPT | Performed by: INTERNAL MEDICINE

## 2024-11-01 PROCEDURE — 258N000003 HC RX IP 258 OP 636: Performed by: INTERNAL MEDICINE

## 2024-11-01 PROCEDURE — 96361 HYDRATE IV INFUSION ADD-ON: CPT | Performed by: INTERNAL MEDICINE

## 2024-11-01 PROCEDURE — 82310 ASSAY OF CALCIUM: CPT | Performed by: INTERNAL MEDICINE

## 2024-11-01 PROCEDURE — 99284 EMERGENCY DEPT VISIT MOD MDM: CPT | Performed by: INTERNAL MEDICINE

## 2024-11-01 PROCEDURE — 72100 X-RAY EXAM L-S SPINE 2/3 VWS: CPT

## 2024-11-01 PROCEDURE — 99284 EMERGENCY DEPT VISIT MOD MDM: CPT | Mod: 25 | Performed by: INTERNAL MEDICINE

## 2024-11-01 PROCEDURE — 85025 COMPLETE CBC W/AUTO DIFF WBC: CPT | Performed by: INTERNAL MEDICINE

## 2024-11-01 PROCEDURE — 96375 TX/PRO/DX INJ NEW DRUG ADDON: CPT | Performed by: INTERNAL MEDICINE

## 2024-11-01 PROCEDURE — 85045 AUTOMATED RETICULOCYTE COUNT: CPT | Performed by: INTERNAL MEDICINE

## 2024-11-01 PROCEDURE — 250N000011 HC RX IP 250 OP 636: Performed by: INTERNAL MEDICINE

## 2024-11-01 PROCEDURE — 96374 THER/PROPH/DIAG INJ IV PUSH: CPT | Performed by: INTERNAL MEDICINE

## 2024-11-01 PROCEDURE — 96376 TX/PRO/DX INJ SAME DRUG ADON: CPT | Performed by: INTERNAL MEDICINE

## 2024-11-01 PROCEDURE — 36415 COLL VENOUS BLD VENIPUNCTURE: CPT | Performed by: INTERNAL MEDICINE

## 2024-11-01 RX ORDER — DIPHENHYDRAMINE HCL 50 MG
CAPSULE ORAL
Qty: 1 CAPSULE | Refills: 0 | Status: SHIPPED | OUTPATIENT
Start: 2024-11-01 | End: 2024-11-01

## 2024-11-01 RX ORDER — METHYLPREDNISOLONE 32 MG/1
TABLET ORAL
Qty: 1 TABLET | Refills: 0 | Status: SHIPPED | OUTPATIENT
Start: 2024-11-01 | End: 2024-11-01

## 2024-11-01 RX ORDER — METHYLPREDNISOLONE 32 MG/1
TABLET ORAL
Qty: 2 TABLET | Refills: 0 | Status: SHIPPED | OUTPATIENT
Start: 2024-11-01

## 2024-11-01 RX ORDER — DIPHENHYDRAMINE HCL 50 MG
CAPSULE ORAL
Qty: 2 CAPSULE | Refills: 0 | Status: SHIPPED | OUTPATIENT
Start: 2024-11-01

## 2024-11-01 RX ORDER — KETOROLAC TROMETHAMINE 30 MG/ML
30 INJECTION, SOLUTION INTRAMUSCULAR; INTRAVENOUS ONCE
Status: COMPLETED | OUTPATIENT
Start: 2024-11-01 | End: 2024-11-01

## 2024-11-01 RX ADMIN — SODIUM CHLORIDE, POTASSIUM CHLORIDE, SODIUM LACTATE AND CALCIUM CHLORIDE 1000 ML: 600; 310; 30; 20 INJECTION, SOLUTION INTRAVENOUS at 21:41

## 2024-11-01 RX ADMIN — KETOROLAC TROMETHAMINE 30 MG: 30 INJECTION, SOLUTION INTRAMUSCULAR at 21:41

## 2024-11-01 RX ADMIN — HYDROMORPHONE HYDROCHLORIDE 1 MG: 1 INJECTION, SOLUTION INTRAMUSCULAR; INTRAVENOUS; SUBCUTANEOUS at 22:57

## 2024-11-01 RX ADMIN — HYDROMORPHONE HYDROCHLORIDE 1 MG: 1 INJECTION, SOLUTION INTRAMUSCULAR; INTRAVENOUS; SUBCUTANEOUS at 21:54

## 2024-11-01 ASSESSMENT — ACTIVITIES OF DAILY LIVING (ADL)
ADLS_ACUITY_SCORE: 0

## 2024-11-01 NOTE — TELEPHONE ENCOUNTER
[facility] Call Center    Phone Message    May a detailed message be left on voicemail: yes     Reason for Call: Other: Patient calling frustrated with getting her CT rescheduled so often due to her missing medication prior to her CT.  Insufficient Prep or Sedation (Patient only took one dose of methylpred, no benadryl ).     Patient is requesting a call back to discuss and clarify dosage. Ok to call or send [employer] message    Action Taken: Message routed to:  [facility]    Travel Screening: Not Applicable     Date of Service:

## 2024-11-01 NOTE — DISCHARGE INSTRUCTIONS
Continue current medications.    Follow-up with your hematologist.    Return if fever, chest pain, cough, trouble breathing, or other concerns.

## 2024-11-01 NOTE — TELEPHONE ENCOUNTER
From earlier encounter with Guera DIAZ-  Imaging requested:    Benadryl x1 and Medrol x1 both 12 hours pre appt and 2 hours pre appt.

## 2024-11-02 VITALS
DIASTOLIC BLOOD PRESSURE: 78 MMHG | SYSTOLIC BLOOD PRESSURE: 115 MMHG | TEMPERATURE: 98.5 F | HEART RATE: 90 BPM | OXYGEN SATURATION: 100 % | RESPIRATION RATE: 18 BRPM | HEIGHT: 64 IN | BODY MASS INDEX: 26.46 KG/M2 | WEIGHT: 155 LBS

## 2024-11-02 PROCEDURE — 96374 THER/PROPH/DIAG INJ IV PUSH: CPT | Performed by: INTERNAL MEDICINE

## 2024-11-02 PROCEDURE — 250N000011 HC RX IP 250 OP 636: Performed by: INTERNAL MEDICINE

## 2024-11-02 RX ORDER — HEPARIN SODIUM (PORCINE) LOCK FLUSH IV SOLN 100 UNIT/ML 100 UNIT/ML
100 SOLUTION INTRAVENOUS ONCE
Status: COMPLETED | OUTPATIENT
Start: 2024-11-02 | End: 2024-11-02

## 2024-11-02 RX ADMIN — HEPARIN 100 UNITS: 100 SYRINGE at 01:35

## 2024-11-02 RX ADMIN — HYDROMORPHONE HYDROCHLORIDE 1 MG: 1 INJECTION, SOLUTION INTRAMUSCULAR; INTRAVENOUS; SUBCUTANEOUS at 00:18

## 2024-11-02 ASSESSMENT — ACTIVITIES OF DAILY LIVING (ADL): ADLS_ACUITY_SCORE: 0

## 2024-11-02 NOTE — ED TRIAGE NOTES
Lifted something that is heavy for her and fell on her back. Has sharp pain to her back now. Pt denies hitting her head. Denies pain anywhere else.

## 2024-11-02 NOTE — ED TRIAGE NOTES
Triage Assessment (Adult)       Row Name 11/01/24 2052          Triage Assessment    Airway WDL WDL        Respiratory WDL    Respiratory WDL WDL        Skin Circulation/Temperature WDL    Skin Circulation/Temperature WDL WDL        Cardiac WDL    Cardiac WDL WDL        Peripheral/Neurovascular WDL    Peripheral Neurovascular WDL WDL        Cognitive/Neuro/Behavioral WDL    Cognitive/Neuro/Behavioral WDL WDL

## 2024-11-02 NOTE — ED PROVIDER NOTES
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     Patient states that she helped her family with moving and did some heavy lifting. She has a painful crisis and she is here for care plan. Denies any chest pain or cough. Denies any other symptoms.     As per Epic review:   She was presented to the [facility] ED yesterday on 10/31/2024 with sickle cell pain crisis after being outside in the cold, rain, and snow today going to various doctors appointments.  Patient complains of primarily back and extremity pain.  She has normal vital signs.  She appears clinically well.  She was treated in accordance with her emergency department sickle cell pain care plan with good control of her symptoms.  Her labs are baseline.  She appears clinically well and appropriate for outpatient management.  Return precautions provided.     Past Medical History  Past Medical History:   Diagnosis Date    Anxiety     Bleeding disorder (H)     Blood clotting disorder (H)     Cerebral infarction (H) 2015    Cognitive developmental delay     low IQ. 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 side (H)     right hand contractures    Iron overload due to repeated red blood cell transfusions     Migraines     Multiple subsegmental pulmonary emboli without acute cor pulmonale (H) 02/01/2021    Oppositional defiant behavior     Presence of  intrauterine contraceptive device 2/18/2020    Superficial venous thrombosis of arm, right 03/25/2021    Uncomplicated asthma      Past Surgical History:   Procedure Laterality Date    AS INSERT TUNNELED CV 2 CATH W/O PORT/PUMP      CHOLECYSTECTOMY      CV RIGHT HEART CATH MEASUREMENTS RECORDED N/A 11/18/2021    Procedure: Right Heart Cath;  Surgeon: Jackson Stauffer MD;  Location:  HEART CARDIAC CATH LAB    INSERT PORT VASCULAR ACCESS Left 4/21/2021    Procedure: INSERTION, VASCULAR ACCESS PORT (NOT SURE ON SIDE UNTIL REMOVAL);  Surgeon: Rajan More MD;  Location: [facility] OR    IR CHEST PORT PLACEMENT > 5 YRS OF AGE  4/21/2021    IR CVC NON TUNNEL LINE REMOVAL  5/6/2021    IR CVC NON TUNNEL PLACEMENT > 5 YRS  04/07/2020    IR CVC NON TUNNEL PLACEMENT > 5 YRS  4/30/2021    IR CVC NON TUNNEL PLACEMENT > 5 YRS  9/7/2022    IR PORT REMOVAL LEFT  4/21/2021    REMOVE PORT VASCULAR ACCESS Left 4/21/2021    Procedure: REMOVAL, VASCULAR ACCESS PORT LEFT;  Surgeon: Rajan More MD;  Location: [facility] OR    REPAIR TENDON ELBOW Right 10/02/2019    Procedure: Right Elbow Flexor Lengthening, Flexor Pronator Slide Of Wrist and Finger, Thumb Adductor Lengthening;  Surgeon: Anai Franco MD;  Location: [facility] OR    TONSILLECTOMY Bilateral 10/02/2019    Procedure: Bilateral Tonsillectomy;  Surgeon: Farhana Guy MD;  Location: [facility] OR    ZZC BREAST REDUCTION (INCLUDES LIPO) TIER 3 Bilateral 04/23/2019     albuterol (PROVENTIL) (2.5 MG/3ML) 0.083% neb solution  aspirin (ASA) 81 MG chewable tablet  budesonide-formoterol (SYMBICORT) 160-4.5 MCG/ACT Inhaler  deferasirox (JADENU) 360 MG tablet  Deferiprone, Twice Daily, 1000 MG TABS  diphenhydrAMINE (BENADRYL) 50 MG capsule  EPINEPHrine (ANY BX GENERIC EQUIV) 0.3 MG/0.3ML injection 2-pack  gabapentin (NEURONTIN) 300 MG capsule  hydroxyurea (HYDREA) 500 MG capsule  ibuprofen (ADVIL/MOTRIN) 800 MG tablet  melatonin 5 MG tablet  methocarbamol (ROBAXIN) 750 MG  "tablet  methylPREDNISolone (MEDROL) 32 MG tablet  naloxone (NARCAN) 4 MG/0.1ML nasal spray  ondansetron (ZOFRAN ODT) 8 MG ODT tab  oxyCODONE IR (ROXICODONE) 10 MG tablet  pantoprazole (PROTONIX) 40 MG EC tablet      Allergies   Allergen Reactions    Contrast Dye Angioedema     Hives and breathing issues    Fish-Derived Products Hives    Seafood Hives    Adhesive Tape Hives     Primipore dressing causes hives    Gadolinium     Iodinated Contrast Media      Family History  Family History   Problem Relation Age of Onset    Sickle Cell Trait Mother     Hypertension Mother     Asthma Mother     Sickle Cell Trait Father     Glaucoma No family hx of     Macular Degeneration No family hx of     Diabetes No family hx of     Gout No family hx of      Social History   Social History     Tobacco Use    Smoking status: Never     Passive exposure: Never    Smokeless tobacco: Never   Substance Use Topics    Alcohol use: Not Currently     Alcohol/week: 0.0 standard drinks of alcohol    Drug use: Never      Past medical history, past surgical history, medications, allergies, family history, and social history were reviewed with the patient. No additional pertinent items.   A complete review of systems was performed with pertinent positives and negatives noted in the HPI, and all other systems negative.    Physical Exam   BP: 121/73  Pulse: 95  Temp: 98.5  F (36.9  C)  Resp: 18  Height: 162.6 cm (5' 4\")  Weight: 70.3 kg (155 lb)  SpO2: 98 %  Physical Exam  Vitals and nursing note reviewed.   Constitutional:       General: She is not in acute distress.     Appearance: She is not diaphoretic.   HENT:      Head: Normocephalic and atraumatic.   Eyes:      Extraocular Movements: Extraocular movements intact.      Conjunctiva/sclera: Conjunctivae normal.   Cardiovascular:      Rate and Rhythm: Normal rate and regular rhythm.      Heart sounds: Normal heart sounds. No murmur heard.     No friction rub. No gallop.   Pulmonary:      Effort: " Pulmonary effort is normal. No respiratory distress.      Breath sounds: Normal breath sounds. No stridor. No wheezing, rhonchi or rales.   Chest:      Chest wall: No tenderness.   Abdominal:      General: Abdomen is flat. Bowel sounds are normal. There is no distension.      Palpations: Abdomen is soft. There is no mass.      Tenderness: There is no abdominal tenderness. There is no right CVA tenderness, left CVA tenderness, guarding or rebound.      Hernia: No hernia is present.   Musculoskeletal:      Cervical back: Normal, normal range of motion and neck supple.      Thoracic back: Normal.      Lumbar back: Spasms and tenderness present. No swelling, edema, deformity, signs of trauma, lacerations or bony tenderness. Decreased range of motion. Negative right straight leg raise test and negative left straight leg raise test. No scoliosis.        Back:    Skin:     General: Skin is warm.      Findings: No rash.   Neurological:      General: No focal deficit present.      Cranial Nerves: No cranial nerve deficit.      Motor: No weakness.      Gait: Gait normal.           ED Course, Procedures, & Data      Procedures            Results for orders placed or performed during the hospital encounter of 11/01/24   Lumbar spine XR, 2-3 views     Status: None    Narrative    EXAM: XR LUMBAR SPINE 2/3 VIEWS  LOCATION: [facility]  DATE: 11/1/2024    INDICATION:  Trauma, pain  COMPARISON:  CT abdomen pelvis 10/25/2024.      Impression    IMPRESSION: No acute compression fracture. Mild levocurvature. No spondylolisthesis. Mild lumbar spondylosis.   Comprehensive metabolic panel     Status: Abnormal   Result Value Ref Range    Sodium 138 135 - 145 mmol/L    Potassium 3.6 3.4 - 5.3 mmol/L    Carbon Dioxide (CO2) 24 22 - 29 mmol/L    Anion Gap 12 7 - 15 mmol/L    Urea Nitrogen 8.3 6.0 - 20.0 mg/dL    Creatinine 0.69 0.51 - 0.95 mg/dL    GFR Estimate >90 >60 mL/min/1.73m2    Calcium 9.0  8.8 - 10.4 mg/dL    Chloride 102 98 - 107 mmol/L    Glucose 96 70 - 99 mg/dL    Alkaline Phosphatase 58 40 - 150 U/L    AST 30 0 - 45 U/L    ALT 23 0 - 50 U/L    Protein Total 7.6 6.4 - 8.3 g/dL    Albumin 4.4 3.5 - 5.2 g/dL    Bilirubin Total 1.8 (H) <=1.2 mg/dL   Reticulocyte count     Status: Abnormal   Result Value Ref Range    % Reticulocyte 12.4 (H) 0.5 - 2.0 %    Absolute Reticulocyte 0.328 (H) 0.025 - 0.095 10e6/uL   HCG qualitative Blood     Status: Normal   Result Value Ref Range    hCG Serum Qualitative Negative Negative   CBC with platelets and differential     Status: Abnormal   Result Value Ref Range    WBC Count 17.4 (H) 4.0 - 11.0 10e3/uL    RBC Count 2.64 (L) 3.80 - 5.20 10e6/uL    Hemoglobin 7.6 (L) 11.7 - 15.7 g/dL    Hematocrit 22.3 (L) 35.0 - 47.0 %    MCV 85 78 - 100 fL    MCH 28.8 26.5 - 33.0 pg    MCHC 34.1 31.5 - 36.5 g/dL    RDW 22.5 (H) 10.0 - 15.0 %    Platelet Count 625 (H) 150 - 450 10e3/uL    % Neutrophils 75 %    % Lymphocytes 15 %    % Monocytes 8 %    % Eosinophils 1 %    % Basophils 1 %    % Immature Granulocytes 1 %    NRBCs per 100 WBC 1 (H) <1 /100    Absolute Neutrophils 13.2 (H) 1.6 - 8.3 10e3/uL    Absolute Lymphocytes 2.6 0.8 - 5.3 10e3/uL    Absolute Monocytes 1.4 (H) 0.0 - 1.3 10e3/uL    Absolute Eosinophils 0.1 0.0 - 0.7 10e3/uL    Absolute Basophils 0.2 0.0 - 0.2 10e3/uL    Absolute Immature Granulocytes 0.1 <=0.4 10e3/uL    Absolute NRBCs 0.1 10e3/uL   RBC and Platelet Morphology     Status: Abnormal   Result Value Ref Range    RBC Morphology Confirmed RBC Indices     Platelet Assessment  Automated Count Confirmed. Platelet morphology is normal.     Automated Count Confirmed. Platelet morphology is normal.    Elliptocytes Slight (A) None Seen    RBC Fragments Slight (A) None Seen   CBC with platelets differential     Status: Abnormal    Narrative    The following orders were created for panel order CBC with platelets differential.  Procedure                                Abnormality         Status                     ---------                               -----------         ------                     CBC with platelets and d...[[id number]]  Abnormal            Final result               RBC and Platelet Morphology[[id number]]  Abnormal            Final result                 Please view results for these tests on the individual orders.     Medications   HYDROmorphone (DILAUDID) injection 1 mg (1 mg Intravenous $Given 11/2/24 0018)   lactated ringers BOLUS 1,000 mL (0 mLs Intravenous Stopped 11/2/24 0101)   ketorolac (TORADOL) injection 30 mg (30 mg Intravenous $Given 11/1/24 2141)   heparin lock flush 100 unit/mL injection 100 Units (100 Units Intravenous $Given 11/2/24 0135)     Labs Ordered and Resulted from Time of ED Arrival to Time of ED Departure   COMPREHENSIVE METABOLIC PANEL - Abnormal       Result Value    Sodium 138      Potassium 3.6      Carbon Dioxide (CO2) 24      Anion Gap 12      Urea Nitrogen 8.3      Creatinine 0.69      GFR Estimate >90      Calcium 9.0      Chloride 102      Glucose 96      Alkaline Phosphatase 58      AST 30      ALT 23      Protein Total 7.6      Albumin 4.4      Bilirubin Total 1.8 (*)    RETICULOCYTE COUNT - Abnormal    % Reticulocyte 12.4 (*)     Absolute Reticulocyte 0.328 (*)    CBC WITH PLATELETS AND DIFFERENTIAL - Abnormal    WBC Count 17.4 (*)     RBC Count 2.64 (*)     Hemoglobin 7.6 (*)     Hematocrit 22.3 (*)     MCV 85      MCH 28.8      MCHC 34.1      RDW 22.5 (*)     Platelet Count 625 (*)     % Neutrophils 75      % Lymphocytes 15      % Monocytes 8      % Eosinophils 1      % Basophils 1      % Immature Granulocytes 1      NRBCs per 100 WBC 1 (*)     Absolute Neutrophils 13.2 (*)     Absolute Lymphocytes 2.6      Absolute Monocytes 1.4 (*)     Absolute Eosinophils 0.1      Absolute Basophils 0.2      Absolute Immature Granulocytes 0.1      Absolute NRBCs 0.1     RBC AND PLATELET MORPHOLOGY - Abnormal    RBC Morphology  Confirmed RBC Indices      Platelet Assessment        Value: Automated Count Confirmed. Platelet morphology is normal.    Elliptocytes Slight (*)     RBC Fragments Slight (*)    HCG QUALITATIVE PREGNANCY - Normal    hCG Serum Qualitative Negative     ROUTINE UA WITH MICROSCOPIC REFLEX TO CULTURE     Lumbar spine XR, 2-3 views   Final Result   IMPRESSION: No acute compression fracture. Mild levocurvature. No spondylolisthesis. Mild lumbar spondylosis.             Critical care was not performed.     Medical Decision Making  The patient's presentation was of high complexity (a chronic illness severe exacerbation, progression, or side effect of treatment).    The patient's evaluation involved:  ordering and/or review of 3+ test(s) in this encounter (see separate area of note for details)    The patient's management necessitated moderate risk (prescription drug management including medications given in the ED).    Assessment & Plan    Painful crisis of sickle cell disease precipitated by lumbar strain, labs and XR ok, the Care plan done with improvements after the third dose, discharge with close follow up with her PMD Heme.    I have reviewed the nursing notes. I have reviewed the findings, diagnosis, plan and need for follow up with the patient.    Discharge Medication List as of 11/2/2024  1:45 AM          Final diagnoses:   Sickle cell pain crisis (H)       Judson Tse Md  [facility] EMERGENCY DEPARTMENT  11/1/2024     Judson Tse MD  11/02/24 0202

## 2024-11-02 NOTE — ED NOTES
Pt discharged to home via cab. Education and instructions for discharge provided, pt stated understanding. Port-a-cath de-accessed with no event. All questions and concerns addressed.

## 2024-11-03 ENCOUNTER — HOSPITAL ENCOUNTER (EMERGENCY)
Facility: CLINIC | Age: 24
Discharge: HOME | End: 2024-11-03
Attending: EMERGENCY MEDICINE | Admitting: EMERGENCY MEDICINE
Payer: COMMERCIAL

## 2024-11-03 VITALS
TEMPERATURE: 98 F | DIASTOLIC BLOOD PRESSURE: 70 MMHG | SYSTOLIC BLOOD PRESSURE: 124 MMHG | BODY MASS INDEX: 26.46 KG/M2 | OXYGEN SATURATION: 94 % | WEIGHT: 155 LBS | HEART RATE: 73 BPM | HEIGHT: 64 IN | RESPIRATION RATE: 16 BRPM

## 2024-11-03 DIAGNOSIS — D57.00 SICKLE CELL PAIN CRISIS (H): ICD-10-CM

## 2024-11-03 LAB
ALBUMIN SERPL BCG-MCNC: 4.5 G/DL (ref 3.5–5.2)
ALP SERPL-CCNC: 60 U/L (ref 40–150)
ALT SERPL W P-5'-P-CCNC: 20 U/L (ref 0–50)
ANION GAP SERPL CALCULATED.3IONS-SCNC: 9 MMOL/L (ref 7–15)
AST SERPL W P-5'-P-CCNC: 28 U/L (ref 0–45)
BASOPHILS # BLD AUTO: 0.2 10E3/UL (ref 0–0.2)
BASOPHILS NFR BLD AUTO: 2 %
BILIRUB SERPL-MCNC: 1.7 MG/DL
BUN SERPL-MCNC: 10.6 MG/DL (ref 6–20)
CALCIUM SERPL-MCNC: 8.8 MG/DL (ref 8.8–10.4)
CHLORIDE SERPL-SCNC: 105 MMOL/L (ref 98–107)
CREAT SERPL-MCNC: 0.61 MG/DL (ref 0.51–0.95)
EGFRCR SERPLBLD CKD-EPI 2021: >90 ML/MIN/1.73M2
EOSINOPHIL # BLD AUTO: 0.4 10E3/UL (ref 0–0.7)
EOSINOPHIL NFR BLD AUTO: 3 %
ERYTHROCYTE [DISTWIDTH] IN BLOOD BY AUTOMATED COUNT: 21.7 % (ref 10–15)
GLUCOSE SERPL-MCNC: 96 MG/DL (ref 70–99)
HCO3 SERPL-SCNC: 25 MMOL/L (ref 22–29)
HCT VFR BLD AUTO: 21.8 % (ref 35–47)
HGB BLD-MCNC: 7.4 G/DL (ref 11.7–15.7)
IMM GRANULOCYTES # BLD: 0 10E3/UL
IMM GRANULOCYTES NFR BLD: 0 %
LYMPHOCYTES # BLD AUTO: 3.2 10E3/UL (ref 0.8–5.3)
LYMPHOCYTES NFR BLD AUTO: 27 %
MCH RBC QN AUTO: 28.9 PG (ref 26.5–33)
MCHC RBC AUTO-ENTMCNC: 33.9 G/DL (ref 31.5–36.5)
MCV RBC AUTO: 85 FL (ref 78–100)
MONOCYTES # BLD AUTO: 0.9 10E3/UL (ref 0–1.3)
MONOCYTES NFR BLD AUTO: 8 %
NEUTROPHILS # BLD AUTO: 6.9 10E3/UL (ref 1.6–8.3)
NEUTROPHILS NFR BLD AUTO: 59 %
NRBC # BLD AUTO: 0.1 10E3/UL
NRBC BLD AUTO-RTO: 1 /100
PLATELET # BLD AUTO: 542 10E3/UL (ref 150–450)
POTASSIUM SERPL-SCNC: 3.7 MMOL/L (ref 3.4–5.3)
PROT SERPL-MCNC: 7.5 G/DL (ref 6.4–8.3)
RBC # BLD AUTO: 2.56 10E6/UL (ref 3.8–5.2)
RETICS # AUTO: 0.28 10E6/UL (ref 0.03–0.1)
RETICS/RBC NFR AUTO: 10.9 % (ref 0.5–2)
SODIUM SERPL-SCNC: 139 MMOL/L (ref 135–145)
WBC # BLD AUTO: 11.7 10E3/UL (ref 4–11)

## 2024-11-03 PROCEDURE — 99284 EMERGENCY DEPT VISIT MOD MDM: CPT | Performed by: EMERGENCY MEDICINE

## 2024-11-03 PROCEDURE — 82310 ASSAY OF CALCIUM: CPT | Performed by: EMERGENCY MEDICINE

## 2024-11-03 PROCEDURE — 96375 TX/PRO/DX INJ NEW DRUG ADDON: CPT | Performed by: EMERGENCY MEDICINE

## 2024-11-03 PROCEDURE — 96374 THER/PROPH/DIAG INJ IV PUSH: CPT | Performed by: EMERGENCY MEDICINE

## 2024-11-03 PROCEDURE — 250N000011 HC RX IP 250 OP 636: Performed by: EMERGENCY MEDICINE

## 2024-11-03 PROCEDURE — 258N000003 HC RX IP 258 OP 636: Performed by: EMERGENCY MEDICINE

## 2024-11-03 PROCEDURE — 96376 TX/PRO/DX INJ SAME DRUG ADON: CPT | Performed by: EMERGENCY MEDICINE

## 2024-11-03 PROCEDURE — 85025 COMPLETE CBC W/AUTO DIFF WBC: CPT | Performed by: EMERGENCY MEDICINE

## 2024-11-03 PROCEDURE — 85045 AUTOMATED RETICULOCYTE COUNT: CPT | Performed by: EMERGENCY MEDICINE

## 2024-11-03 PROCEDURE — 99284 EMERGENCY DEPT VISIT MOD MDM: CPT | Mod: 25 | Performed by: EMERGENCY MEDICINE

## 2024-11-03 PROCEDURE — 36415 COLL VENOUS BLD VENIPUNCTURE: CPT | Performed by: EMERGENCY MEDICINE

## 2024-11-03 PROCEDURE — 96361 HYDRATE IV INFUSION ADD-ON: CPT | Performed by: EMERGENCY MEDICINE

## 2024-11-03 RX ORDER — HEPARIN SODIUM (PORCINE) LOCK FLUSH IV SOLN 100 UNIT/ML 100 UNIT/ML
5-10 SOLUTION INTRAVENOUS
Status: DISCONTINUED | OUTPATIENT
Start: 2024-11-03 | End: 2024-11-03 | Stop reason: HOSPADM

## 2024-11-03 RX ORDER — KETOROLAC TROMETHAMINE 30 MG/ML
30 INJECTION, SOLUTION INTRAMUSCULAR; INTRAVENOUS ONCE
Status: COMPLETED | OUTPATIENT
Start: 2024-11-03 | End: 2024-11-03

## 2024-11-03 RX ADMIN — HYDROMORPHONE HYDROCHLORIDE 1 MG: 1 INJECTION, SOLUTION INTRAMUSCULAR; INTRAVENOUS; SUBCUTANEOUS at 05:20

## 2024-11-03 RX ADMIN — HEPARIN 5 ML: 100 SYRINGE at 06:27

## 2024-11-03 RX ADMIN — SODIUM CHLORIDE, POTASSIUM CHLORIDE, SODIUM LACTATE AND CALCIUM CHLORIDE 1000 ML: 600; 310; 30; 20 INJECTION, SOLUTION INTRAVENOUS at 03:15

## 2024-11-03 RX ADMIN — HYDROMORPHONE HYDROCHLORIDE 1 MG: 1 INJECTION, SOLUTION INTRAMUSCULAR; INTRAVENOUS; SUBCUTANEOUS at 04:22

## 2024-11-03 RX ADMIN — HYDROMORPHONE HYDROCHLORIDE 1 MG: 1 INJECTION, SOLUTION INTRAMUSCULAR; INTRAVENOUS; SUBCUTANEOUS at 03:10

## 2024-11-03 RX ADMIN — KETOROLAC TROMETHAMINE 30 MG: 30 INJECTION, SOLUTION INTRAMUSCULAR at 03:13

## 2024-11-03 ASSESSMENT — ACTIVITIES OF DAILY LIVING (ADL)
ADLS_ACUITY_SCORE: 0

## 2024-11-03 ASSESSMENT — ENCOUNTER SYMPTOMS: SICKLE CELL PAIN: 1

## 2024-11-03 NOTE — DISCHARGE INSTRUCTIONS
Continue with your normal medications. Follow up with your outpatient clinic. Return with any worsening or concerns.

## 2024-11-03 NOTE — ED PROVIDER NOTES
"ED Provider Note  [facility]      History     Chief Complaint   Patient presents with    Sickle Cell Pain Crisis     \"Ran out of my meds tried to wing but couldn't do it anymore\"     HPI  Jennifer Cervantes is a 24 year old female with a history of sickle cell disease and cerebral infarction with care plan in place who presents to the emergency department with sickle cell pain. ***    Past Medical History  Past Medical History:   Diagnosis Date    Anxiety     Bleeding disorder (H)     Blood clotting disorder (H)     Cerebral infarction (H) 2015    Cognitive developmental delay     low IQ. 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 side (H)     right hand contractures    Iron overload due to repeated red blood cell transfusions     Migraines     Multiple subsegmental pulmonary emboli without acute cor pulmonale (H) 02/01/2021    Oppositional defiant behavior     Presence of intrauterine contraceptive device 2/18/2020    Superficial venous thrombosis of arm, right 03/25/2021    Uncomplicated asthma      Past Surgical History:   Procedure Laterality Date    AS INSERT TUNNELED CV 2 CATH W/O PORT/PUMP      CHOLECYSTECTOMY      CV RIGHT HEART CATH MEASUREMENTS RECORDED N/A 11/18/2021    Procedure: Right Heart Cath;  Surgeon: Jackson Stauffer MD;  Location:  HEART CARDIAC CATH LAB    INSERT PORT VASCULAR ACCESS Left 4/21/2021    Procedure: INSERTION, VASCULAR ACCESS PORT (NOT SURE ON SIDE UNTIL REMOVAL);  Surgeon: Rajan More MD;  Location: [facility] OR    IR CHEST PORT PLACEMENT > 5 YRS OF AGE  4/21/2021    IR CVC NON TUNNEL LINE REMOVAL  5/6/2021    IR CVC NON TUNNEL PLACEMENT > 5 YRS  04/07/2020    IR CVC NON TUNNEL PLACEMENT > 5 YRS  4/30/2021    IR CVC NON TUNNEL PLACEMENT > 5 YRS  9/7/2022    IR PORT REMOVAL LEFT  4/21/2021    REMOVE PORT VASCULAR ACCESS Left 4/21/2021 "    Procedure: REMOVAL, VASCULAR ACCESS PORT LEFT;  Surgeon: Rajan More MD;  Location: [facility] OR    REPAIR TENDON ELBOW Right 10/02/2019    Procedure: Right Elbow Flexor Lengthening, Flexor Pronator Slide Of Wrist and Finger, Thumb Adductor Lengthening;  Surgeon: Anai Franco MD;  [facility]    TONSILLECTOMY Bilateral 10/02/2019    Procedure: Bilateral Tonsillectomy;  Surgeon: Farhana Guy MD;  Location: [facility] OR    ZZC BREAST REDUCTION (INCLUDES LIPO) TIER 3 Bilateral 04/23/2019     albuterol (PROVENTIL) (2.5 MG/3ML) 0.083% neb solution  aspirin (ASA) 81 MG chewable tablet  budesonide-formoterol (SYMBICORT) 160-4.5 MCG/ACT Inhaler  deferasirox (JADENU) 360 MG tablet  Deferiprone, Twice Daily, 1000 MG TABS  diphenhydrAMINE (BENADRYL) 50 MG capsule  EPINEPHrine (ANY BX GENERIC EQUIV) 0.3 MG/0.3ML injection 2-pack  gabapentin (NEURONTIN) 300 MG capsule  hydroxyurea (HYDREA) 500 MG capsule  ibuprofen (ADVIL/MOTRIN) 800 MG tablet  melatonin 5 MG tablet  methocarbamol (ROBAXIN) 750 MG tablet  methylPREDNISolone (MEDROL) 32 MG tablet  naloxone (NARCAN) 4 MG/0.1ML nasal spray  ondansetron (ZOFRAN ODT) 8 MG ODT tab  oxyCODONE IR (ROXICODONE) 10 MG tablet  pantoprazole (PROTONIX) 40 MG EC tablet      Allergies   Allergen Reactions    Contrast Dye Angioedema     Hives and breathing issues    Fish-Derived Products Hives    Seafood Hives    Adhesive Tape Hives     Primipore dressing causes hives    Gadolinium     Iodinated Contrast Media      Family History  Family History   Problem Relation Age of Onset    Sickle Cell Trait Mother     Hypertension Mother     Asthma Mother     Sickle Cell Trait Father     Glaucoma No family hx of     Macular Degeneration No family hx of     Diabetes No family hx of     Gout No family hx of      Social History   Social History     Tobacco Use    Smoking status: Never     Passive exposure: Never    Smokeless tobacco: Never   Substance Use Topics    Alcohol use: Not  "Currently     Alcohol/week: 0.0 standard drinks of alcohol    Drug use: Never      A medically appropriate review of systems was performed with pertinent positives and negatives noted in the HPI, and all other systems negative.    Physical Exam   BP: 115/76  Pulse: 96  Temp: 98.2  F (36.8  C)  Resp: 16  Height: 162.6 cm (5' 4\")  Weight: 70.3 kg (155 lb)  SpO2: 99 %  Physical Exam  ***    ED Course, Procedures, & Data      Procedures       {ED Course Selections (Optional):586168}  {ED Sepsis CMS Documentation (Optional):787564::\" \"}       No results found for any visits on 11/03/24.  Medications - No data to display  Labs Ordered and Resulted from Time of ED Arrival to Time of ED Departure - No data to display  No orders to display          {Critical Care Performed?:372580}    Assessment & Plan    ***    I have reviewed the nursing notes. I have reviewed the findings, diagnosis, plan and need for follow up with the patient.    New Prescriptions    No medications on file       Final diagnoses:   None       ***  [facility] EMERGENCY DEPARTMENT  11/3/2024  "

## 2024-11-03 NOTE — ED PROVIDER NOTES
"ED Provider Note  [facility]      History     Chief Complaint   Patient presents with    Sickle Cell Pain Crisis     \"Ran out of my meds tried to wing but couldn't do it anymore\"       Sickle Cell Pain Crisis    Jennifer Cervantes is a 24 year old female with a history of asthma, sickle cell disease and cerebral infarction with care plan in place who presents to the emergency department with sickle cell pain. The patient has had diffuse pain for the past day which she was managing well at home with her PRN medications; however, she continued to have breakthrough pain and ran out of her medication. She tried to wait it out but continued to have significant pain, prompting her to come to the ED. She rates her current pain as 8/10 in severity and states it is similar to that she has felt in the past with previous sickle cell pain events. She had been using her prescribed medications but has run out, prompting her to come to the ED for further management of her pain. She notes intermittent cough but denies any fever, chills, shortness of breath, nausea, vomiting, or specific location of pain. Of note, the patient states previous pain crises have been both diffuse and localized. She denies any trauma.    Past Medical History  Past Medical History:   Diagnosis Date    Anxiety     Bleeding disorder (H)     Blood clotting disorder (H)     Cerebral infarction (H) 2015    Cognitive developmental delay     low IQ. 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 side (H)     right hand contractures    Iron overload due to repeated red blood cell transfusions     Migraines     Multiple subsegmental pulmonary emboli without acute cor pulmonale (H) 02/01/2021    Oppositional defiant behavior     Presence of intrauterine contraceptive device 2/18/2020    Superficial venous thrombosis of arm, " right 03/25/2021    Uncomplicated asthma      Past Surgical History:   Procedure Laterality Date    AS INSERT TUNNELED CV 2 CATH W/O PORT/PUMP      CHOLECYSTECTOMY      CV RIGHT HEART CATH MEASUREMENTS RECORDED N/A 11/18/2021    Procedure: Right Heart Cath;  Surgeon: Jackson Stauffer MD;  Location:  HEART CARDIAC CATH LAB    INSERT PORT VASCULAR ACCESS Left 4/21/2021    Procedure: INSERTION, VASCULAR ACCESS PORT (NOT SURE ON SIDE UNTIL REMOVAL);  Surgeon: Rajan More MD;  Location: [facility] OR    IR CHEST PORT PLACEMENT > 5 YRS OF AGE  4/21/2021    IR CVC NON TUNNEL LINE REMOVAL  5/6/2021    IR CVC NON TUNNEL PLACEMENT > 5 YRS  04/07/2020    IR CVC NON TUNNEL PLACEMENT > 5 YRS  4/30/2021    IR CVC NON TUNNEL PLACEMENT > 5 YRS  9/7/2022    IR PORT REMOVAL LEFT  4/21/2021    REMOVE PORT VASCULAR ACCESS Left 4/21/2021    Procedure: REMOVAL, VASCULAR ACCESS PORT LEFT;  Surgeon: Rajan More MD;  Location: [facility] OR    REPAIR TENDON ELBOW Right 10/02/2019    Procedure: Right Elbow Flexor Lengthening, Flexor Pronator Slide Of Wrist and Finger, Thumb Adductor Lengthening;  Surgeon: Anai Franco MD;  Location: [facility] OR    TONSILLECTOMY Bilateral 10/02/2019    Procedure: Bilateral Tonsillectomy;  Surgeon: Farhana Guy MD;  Location: [facility] OR    ZZC BREAST REDUCTION (INCLUDES LIPO) TIER 3 Bilateral 04/23/2019     albuterol (PROVENTIL) (2.5 MG/3ML) 0.083% neb solution  aspirin (ASA) 81 MG chewable tablet  budesonide-formoterol (SYMBICORT) 160-4.5 MCG/ACT Inhaler  deferasirox (JADENU) 360 MG tablet  Deferiprone, Twice Daily, 1000 MG TABS  diphenhydrAMINE (BENADRYL) 50 MG capsule  EPINEPHrine (ANY BX GENERIC EQUIV) 0.3 MG/0.3ML injection 2-pack  gabapentin (NEURONTIN) 300 MG capsule  hydroxyurea (HYDREA) 500 MG capsule  ibuprofen (ADVIL/MOTRIN) 800 MG tablet  melatonin 5 MG tablet  methocarbamol (ROBAXIN) 750 MG tablet  methylPREDNISolone (MEDROL) 32 MG tablet  naloxone (NARCAN) 4 MG/0.1ML nasal  "spray  ondansetron (ZOFRAN ODT) 8 MG ODT tab  oxyCODONE IR (ROXICODONE) 10 MG tablet  pantoprazole (PROTONIX) 40 MG EC tablet      Allergies   Allergen Reactions    Contrast Dye Angioedema     Hives and breathing issues    Fish-Derived Products Hives    Seafood Hives    Adhesive Tape Hives     Primipore dressing causes hives    Gadolinium     Iodinated Contrast Media      Family History  Family History   Problem Relation Age of Onset    Sickle Cell Trait Mother     Hypertension Mother     Asthma Mother     Sickle Cell Trait Father     Glaucoma No family hx of     Macular Degeneration No family hx of     Diabetes No family hx of     Gout No family hx of      Social History   Social History     Tobacco Use    Smoking status: Never     Passive exposure: Never    Smokeless tobacco: Never   Substance Use Topics    Alcohol use: Not Currently     Alcohol/week: 0.0 standard drinks of alcohol    Drug use: Never      A medically appropriate review of systems was performed with pertinent positives and negatives noted in the HPI, and all other systems negative.    Physical Exam   BP: 115/76  Pulse: 96  Temp: 98.2  F (36.8  C)  Resp: 16  Height: 162.6 cm (5' 4\")  Weight: 70.3 kg (155 lb)  SpO2: 99 %  Physical Exam  Constitutional:       General: She is not in acute distress.     Appearance: Normal appearance. She is not toxic-appearing.   HENT:      Head: Atraumatic.   Eyes:      General: No scleral icterus.     Conjunctiva/sclera: Conjunctivae normal.   Cardiovascular:      Rate and Rhythm: Normal rate and regular rhythm.      Heart sounds: Normal heart sounds.   Pulmonary:      Effort: No respiratory distress.   Abdominal:      Palpations: Abdomen is soft.      Tenderness: There is no abdominal tenderness.   Musculoskeletal:         General: No tenderness.      Cervical back: Neck supple.   Skin:     General: Skin is warm.      Findings: No rash.   Neurological:      Mental Status: She is alert.         ED Course, Procedures, " & Data           Results for orders placed or performed during the hospital encounter of 11/03/24   Comprehensive metabolic panel     Status: Abnormal   Result Value Ref Range    Sodium 139 135 - 145 mmol/L    Potassium 3.7 3.4 - 5.3 mmol/L    Carbon Dioxide (CO2) 25 22 - 29 mmol/L    Anion Gap 9 7 - 15 mmol/L    Urea Nitrogen 10.6 6.0 - 20.0 mg/dL    Creatinine 0.61 0.51 - 0.95 mg/dL    GFR Estimate >90 >60 mL/min/1.73m2    Calcium 8.8 8.8 - 10.4 mg/dL    Chloride 105 98 - 107 mmol/L    Glucose 96 70 - 99 mg/dL    Alkaline Phosphatase 60 40 - 150 U/L    AST 28 0 - 45 U/L    ALT 20 0 - 50 U/L    Protein Total 7.5 6.4 - 8.3 g/dL    Albumin 4.5 3.5 - 5.2 g/dL    Bilirubin Total 1.7 (H) <=1.2 mg/dL   Reticulocyte count     Status: Abnormal   Result Value Ref Range    % Reticulocyte 10.9 (H) 0.5 - 2.0 %    Absolute Reticulocyte 0.280 (H) 0.025 - 0.095 10e6/uL   CBC with platelets and differential     Status: Abnormal   Result Value Ref Range    WBC Count 11.7 (H) 4.0 - 11.0 10e3/uL    RBC Count 2.56 (L) 3.80 - 5.20 10e6/uL    Hemoglobin 7.4 (L) 11.7 - 15.7 g/dL    Hematocrit 21.8 (L) 35.0 - 47.0 %    MCV 85 78 - 100 fL    MCH 28.9 26.5 - 33.0 pg    MCHC 33.9 31.5 - 36.5 g/dL    RDW 21.7 (H) 10.0 - 15.0 %    Platelet Count 542 (H) 150 - 450 10e3/uL    % Neutrophils 59 %    % Lymphocytes 27 %    % Monocytes 8 %    % Eosinophils 3 %    % Basophils 2 %    % Immature Granulocytes 0 %    NRBCs per 100 WBC 1 (H) <1 /100    Absolute Neutrophils 6.9 1.6 - 8.3 10e3/uL    Absolute Lymphocytes 3.2 0.8 - 5.3 10e3/uL    Absolute Monocytes 0.9 0.0 - 1.3 10e3/uL    Absolute Eosinophils 0.4 0.0 - 0.7 10e3/uL    Absolute Basophils 0.2 0.0 - 0.2 10e3/uL    Absolute Immature Granulocytes 0.0 <=0.4 10e3/uL    Absolute NRBCs 0.1 10e3/uL   CBC with platelets differential     Status: Abnormal    Narrative    The following orders were created for panel order CBC with platelets differential.  Procedure                               Abnormality          Status                     ---------                               -----------         ------                     CBC with platelets and d...[[id number]]  Abnormal            Final result                 Please view results for these tests on the individual orders.     Medications   heparin lock flush 100 unit/mL injection 5-10 mL (has no administration in time range)   HYDROmorphone (DILAUDID) injection 1 mg (1 mg Intravenous $Given 11/3/24 0520)   ketorolac (TORADOL) injection 30 mg (30 mg Intravenous $Given 11/3/24 0313)   lactated ringers BOLUS 1,000 mL (0 mLs Intravenous Stopped 11/3/24 0520)     Labs Ordered and Resulted from Time of ED Arrival to Time of ED Departure   COMPREHENSIVE METABOLIC PANEL - Abnormal       Result Value    Sodium 139      Potassium 3.7      Carbon Dioxide (CO2) 25      Anion Gap 9      Urea Nitrogen 10.6      Creatinine 0.61      GFR Estimate >90      Calcium 8.8      Chloride 105      Glucose 96      Alkaline Phosphatase 60      AST 28      ALT 20      Protein Total 7.5      Albumin 4.5      Bilirubin Total 1.7 (*)    RETICULOCYTE COUNT - Abnormal    % Reticulocyte 10.9 (*)     Absolute Reticulocyte 0.280 (*)    CBC WITH PLATELETS AND DIFFERENTIAL - Abnormal    WBC Count 11.7 (*)     RBC Count 2.56 (*)     Hemoglobin 7.4 (*)     Hematocrit 21.8 (*)     MCV 85      MCH 28.9      MCHC 33.9      RDW 21.7 (*)     Platelet Count 542 (*)     % Neutrophils 59      % Lymphocytes 27      % Monocytes 8      % Eosinophils 3      % Basophils 2      % Immature Granulocytes 0      NRBCs per 100 WBC 1 (*)     Absolute Neutrophils 6.9      Absolute Lymphocytes 3.2      Absolute Monocytes 0.9      Absolute Eosinophils 0.4      Absolute Basophils 0.2      Absolute Immature Granulocytes 0.0      Absolute NRBCs 0.1       No orders to display          Assessment & Plan    Jennifer Cervantes is a 24 year old female with a history of asthma, sickle cell disease and cerebral infarction with care plan in  place who presents to the emergency department with sickle cell pain. The patient presents with diffuse pain which she rates as 8/10 in severity and is similar to previous pain crises. On exam, wheezes heard in RULF but resolved with coughing and she denies shortness of breath. Patient advised to utilize PRN asthma medications. The patient was recently seen on 10/31/2024 by hematology and they have been working to taper her oxycodone medication. In this context, it is unclear if this current episode represents clear sickle cell pain crisis or worsening pain due to decreasing opioid dosage. However, given pain preceded home medication cessation it is reasonable to treat for presumed pain crisis per treatment plan.  Basic labs fairly stable compared with previous.  No infectious signs or symptoms.  No trauma.  Upon reevaluation, patient states she is feeling improved, feels she is ready to discharge.  She is encouraged to follow-up with her outpatient provider, return for concerns..    I have reviewed the nursing notes. I have reviewed the findings, diagnosis, plan and need for follow up with the patient.    New Prescriptions    No medications on file       Final diagnoses:   None     Luzmaria Neff, MS4    --    ED Attending Physician Attestation    I Court Ring MD, cared for this patient with the Medical Student. I performed, or re-performed, the physical exam and medical decision-making. I have verified the accuracy of all the medical student findings and documentation above, and have edited as necessary.    Summary of HPI, PE, ED Course   Patient is a 24 year old female evaluated in the emergency department for sickle cell pain crisis.  The patient reports diffuse pain, states that it has been worse for the last day or so.  She states similar to previous sickle cell pain crisis.  She is out of her outpatient medications.  She denies trauma, fever.  No infectious signs or symptoms.. Exam and ED course  notable for labs stable compared to previous.  She did get care plan meds, on repeat assessment states she feels much improved, ready to discharge.  She is encouraged to follow-up with her outpatient provider, return with any concerns.. After the completion of care in the emergency department, the patient was discharged.        Medical Decision Making  The patient's presentation was of moderate complexity (a chronic illness mild to moderate exacerbation, progression, or side effect of treatment).    The patient's evaluation involved:  review of external note(s) from 1 sources (previous note)  review of 3+ test result(s) ordered prior to this encounter (previous results)  ordering and/or review of 3+ test(s) in this encounter (see separate area of note for details)    The patient's management necessitated high risk (a parenteral controlled substance).      Court Ring MD  Emergency Medicine       Dr. Court Ring MD    [facility] EMERGENCY DEPARTMENT  11/3/2024     Court Ring MD  11/03/24 0637

## 2024-11-03 NOTE — ED TRIAGE NOTES
"\"Ran out of my meds tried to wing but couldn't do it anymore\"     Triage Assessment (Adult)       Row Name 11/03/24 0140          Triage Assessment    Airway WDL WDL        Respiratory WDL    Respiratory WDL WDL        Skin Circulation/Temperature WDL    Skin Circulation/Temperature WDL WDL        Cardiac WDL    Cardiac WDL WDL        Peripheral/Neurovascular WDL    Peripheral Neurovascular WDL WDL        Cognitive/Neuro/Behavioral WDL    Cognitive/Neuro/Behavioral WDL WDL                     "

## 2024-11-04 ENCOUNTER — PATIENT OUTREACH (OUTPATIENT)
Dept: CARE COORDINATION | Facility: CLINIC | Age: 24
End: 2024-11-04
Payer: COMMERCIAL

## 2024-11-04 ENCOUNTER — INFUSION THERAPY VISIT (OUTPATIENT)
Dept: TRANSPLANT | Facility: CLINIC | Age: 24
End: 2024-11-04
Attending: REGISTERED NURSE
Payer: COMMERCIAL

## 2024-11-04 ENCOUNTER — NURSE TRIAGE (OUTPATIENT)
Dept: ONCOLOGY | Facility: CLINIC | Age: 24
End: 2024-11-04
Payer: COMMERCIAL

## 2024-11-04 VITALS
SYSTOLIC BLOOD PRESSURE: 119 MMHG | TEMPERATURE: 98.5 F | RESPIRATION RATE: 16 BRPM | HEART RATE: 98 BPM | DIASTOLIC BLOOD PRESSURE: 75 MMHG | OXYGEN SATURATION: 96 %

## 2024-11-04 DIAGNOSIS — G81.10 SPASTIC HEMIPLEGIA, UNSPECIFIED ETIOLOGY, UNSPECIFIED LATERALITY (H): ICD-10-CM

## 2024-11-04 DIAGNOSIS — D57.00 SICKLE CELL PAIN CRISIS (H): Primary | ICD-10-CM

## 2024-11-04 DIAGNOSIS — D57.00 SICKLE CELL PAIN CRISIS (H): ICD-10-CM

## 2024-11-04 PROCEDURE — 250N000013 HC RX MED GY IP 250 OP 250 PS 637: Performed by: PEDIATRICS

## 2024-11-04 PROCEDURE — 96374 THER/PROPH/DIAG INJ IV PUSH: CPT

## 2024-11-04 PROCEDURE — 96375 TX/PRO/DX INJ NEW DRUG ADDON: CPT

## 2024-11-04 PROCEDURE — 96376 TX/PRO/DX INJ SAME DRUG ADON: CPT

## 2024-11-04 PROCEDURE — 96361 HYDRATE IV INFUSION ADD-ON: CPT

## 2024-11-04 PROCEDURE — 250N000011 HC RX IP 250 OP 636: Performed by: PEDIATRICS

## 2024-11-04 PROCEDURE — 258N000003 HC RX IP 258 OP 636: Performed by: PEDIATRICS

## 2024-11-04 RX ORDER — DIPHENHYDRAMINE HYDROCHLORIDE 50 MG/ML
50 INJECTION INTRAMUSCULAR; INTRAVENOUS
Status: CANCELLED
Start: 2024-11-04

## 2024-11-04 RX ORDER — HEPARIN SODIUM,PORCINE 10 UNIT/ML
5 VIAL (ML) INTRAVENOUS
Status: CANCELLED | OUTPATIENT
Start: 2025-01-01

## 2024-11-04 RX ORDER — ONDANSETRON 4 MG/1
8 TABLET, FILM COATED ORAL
Status: CANCELLED
Start: 2025-01-01

## 2024-11-04 RX ORDER — ONDANSETRON 2 MG/ML
8 INJECTION INTRAMUSCULAR; INTRAVENOUS EVERY 6 HOURS PRN
Status: CANCELLED
Start: 2025-01-01

## 2024-11-04 RX ORDER — DIPHENHYDRAMINE HCL 25 MG
50 CAPSULE ORAL
Status: CANCELLED
Start: 2025-01-01

## 2024-11-04 RX ORDER — DIPHENHYDRAMINE HYDROCHLORIDE 50 MG/ML
25 INJECTION INTRAMUSCULAR; INTRAVENOUS
Status: CANCELLED
Start: 2024-11-04

## 2024-11-04 RX ORDER — KETOROLAC TROMETHAMINE 30 MG/ML
30 INJECTION, SOLUTION INTRAMUSCULAR; INTRAVENOUS ONCE
Status: CANCELLED
Start: 2025-01-01 | End: 2025-01-01

## 2024-11-04 RX ORDER — DIPHENHYDRAMINE HCL 25 MG
50 CAPSULE ORAL
Status: COMPLETED | OUTPATIENT
Start: 2024-11-04 | End: 2024-11-04

## 2024-11-04 RX ORDER — ALBUTEROL SULFATE 0.83 MG/ML
2.5 SOLUTION RESPIRATORY (INHALATION)
Status: CANCELLED | OUTPATIENT
Start: 2024-11-04

## 2024-11-04 RX ORDER — HEPARIN SODIUM (PORCINE) LOCK FLUSH IV SOLN 100 UNIT/ML 100 UNIT/ML
5 SOLUTION INTRAVENOUS
Status: CANCELLED | OUTPATIENT
Start: 2025-01-01

## 2024-11-04 RX ORDER — ALBUTEROL SULFATE 90 UG/1
1-2 INHALANT RESPIRATORY (INHALATION)
Status: CANCELLED
Start: 2024-11-04

## 2024-11-04 RX ORDER — OXYCODONE HYDROCHLORIDE 10 MG/1
10 TABLET ORAL EVERY 6 HOURS PRN
Qty: 10 TABLET | Refills: 0 | Status: SHIPPED | OUTPATIENT
Start: 2024-11-04 | End: 2024-11-11

## 2024-11-04 RX ORDER — METHYLPREDNISOLONE SODIUM SUCCINATE 40 MG/ML
40 INJECTION INTRAMUSCULAR; INTRAVENOUS
Status: CANCELLED
Start: 2024-11-04

## 2024-11-04 RX ORDER — KETOROLAC TROMETHAMINE 30 MG/ML
30 INJECTION, SOLUTION INTRAMUSCULAR; INTRAVENOUS ONCE
Status: COMPLETED | OUTPATIENT
Start: 2024-11-04 | End: 2024-11-04

## 2024-11-04 RX ORDER — EPINEPHRINE 1 MG/ML
0.3 INJECTION, SOLUTION, CONCENTRATE INTRAVENOUS EVERY 5 MIN PRN
Status: CANCELLED | OUTPATIENT
Start: 2024-11-04

## 2024-11-04 RX ORDER — MEPERIDINE HYDROCHLORIDE 25 MG/ML
25 INJECTION INTRAMUSCULAR; INTRAVENOUS; SUBCUTANEOUS
Status: CANCELLED | OUTPATIENT
Start: 2024-11-04

## 2024-11-04 RX ADMIN — DIPHENHYDRAMINE HYDROCHLORIDE 50 MG: 25 CAPSULE ORAL at 12:03

## 2024-11-04 RX ADMIN — HYDROMORPHONE HYDROCHLORIDE 1 MG: 1 INJECTION, SOLUTION INTRAMUSCULAR; INTRAVENOUS; SUBCUTANEOUS at 12:03

## 2024-11-04 RX ADMIN — KETOROLAC TROMETHAMINE 30 MG: 30 INJECTION, SOLUTION INTRAMUSCULAR; INTRAVENOUS at 12:03

## 2024-11-04 RX ADMIN — HYDROMORPHONE HYDROCHLORIDE 1 MG: 1 INJECTION, SOLUTION INTRAMUSCULAR; INTRAVENOUS; SUBCUTANEOUS at 13:04

## 2024-11-04 RX ADMIN — SODIUM CHLORIDE, POTASSIUM CHLORIDE, SODIUM LACTATE AND CALCIUM CHLORIDE 1000 ML: 600; 310; 30; 20 INJECTION, SOLUTION INTRAVENOUS at 12:01

## 2024-11-04 RX ADMIN — HYDROMORPHONE HYDROCHLORIDE 1 MG: 1 INJECTION, SOLUTION INTRAMUSCULAR; INTRAVENOUS; SUBCUTANEOUS at 14:15

## 2024-11-04 ASSESSMENT — PAIN SCALES - GENERAL: PAINLEVEL_OUTOF10: EXTREME PAIN (8)

## 2024-11-04 NOTE — PROGRESS NOTES
Ambulatory Care Management- Transportation Support  [facility]     Data/Intervention:  Patient Name:    Jennifer Cervantes DOB/Age: 2000 (24 year old)     CCRC team member assisting Specialty SW with transportation request.      Assessment:  CHW/CTA called [facility] [employer] [facility] [employer] to arrange medical appointment transportation in conjunction with patient's insurance.     Taxi company: [employer]    Patient will need to call above taxi company at phone number: [phone] when ready for return ride home.      Plan:  Patient is aware of the transportation plan.  available to assist with any other needs.      Lisandra Kilgore MA

## 2024-11-04 NOTE — PROGRESS NOTES
Infusion Nursing Note:  Jennifer Cervantes presents today for add on infusion.    Patient seen by provider today: No   present during visit today: Not Applicable.    Note: Patient arrived for add on IVF/pain medication. Patient reports 8/10 generalized pain. States pain has been ongoing for weeks and continues despite home pain medication regimen. Denies chest pain, SOB, N/V, fever. ONC toxicity assessment completed. Home medication and allergies reviewed. Received 1L LR per order. Received benadryl 25 mg PO, Toradol 30 mg IVP, and dilaudid 1 mg IVP every 1 hour for a total of 3 doses.      Intravenous Access:  Implanted Port.    Treatment Conditions:  Not Applicable.      Post Infusion Assessment:  Patient tolerated infusion without incident.  Blood return noted pre and post infusion.  Site patent and intact, free from redness, edema or discomfort.  No evidence of extravasations.  Access discontinued per protocol.       Discharge Plan:   Patient discharged in stable condition accompanied by: self.  Departure Mode: Ambulatory.      Jennifer Lai RN

## 2024-11-04 NOTE — TELEPHONE ENCOUNTER
Narcotic Refill Request  Person Requesting Refill:    Medication(s) requested:  oxycodone IR 10 mg tablet  Last fill date and by whom:  10/28/24 by Dr. Duncan  What pain is the medication treating: sickle cell pain  How is the medication being taken?: taking one tablet Q 6H  Does pt have enough for today? No; pt was out of medication last night  Is pain being adequately controlled on the current regimen?: yes  Experiencing any side effects from medication?: no    Date of most recent appointment:  10/31/24 with Patricia Mantilla CNP  Any No Show Visits:No  Next appointment:   11/25/24 with Patricia Mantilla CNP     Reviewed: No    Routed/Paged provider: Patricia Mantilla CNP

## 2024-11-04 NOTE — TELEPHONE ENCOUNTER
Oncology Nurse Triage - Sickle Cell Pain Crisis:  Situation: Jennifer  calling about Sickle Cell Pain Crisis, requesting to be added on for IV fluids and pain medicine    Background:   Patient's last infusion was ED yesterday  Last clinic visit date:10/31/24 with Patricia Mantilla CNP  Does patient have active treatment plan?  Yes    Assessment of Symptoms:  Onset/Duration of symptoms: 1 day--started last night    Is it typical sickle cell pain? Yes   Location: all over and pt in ED yesterday because she ran out of pain meds  Character: Sharp           Intensity: 7/10    Any radiation of pain, numbness, tingling, weakness, warmth, swelling, discoloration of arms or legs?No     Fever?No  Chest Pain Present: No   Shortness of breath: No     Other home therapies tried: HEAT/HEATING PAD and WARM BATH     Last home medication taken and when: out of oxycodone, ran out late last night.     Any Refills Needed?: Yes Pended up in separate refill encounter    Does patient have transportation & length of time to get to clinic: Yes   Yes if apt is early; 15-20 min  Needs transportation to and from clinic d/t apt at 1130, later morning.    Recommendations:   If you do not hear from the infusion center by 2pm then you will not be able to get in for an infusion today. If symptoms worsen while waiting for call back, and/or you experience fever, chills, SOB, chest pain, cough, n/v, dizziness, numbness, swelling, discoloration of extremities, then seek emergency evaluation in Emergency Department.     Pt voiced understanding.  0714: Secure chat sent to Dr. Duncan for approval of infusion request d/t pt being in the ED yesterday.  0721: BMT able to offer infusion apt at  09 and 1130.   0732: Per Darien Gomez to have IVF/pm  0737: Call made to Jennifer who asked why she was not offered the 09 apt. Informed Jennifer that her request needed provider approval per our protocol d/t her being in the ED this weekend/yesterday (11/1 and 11/3). Because we  want to make sure all available apts are offered, we offered her the 1130 apt. Pt requested transportation assistance to and from clinic d/t later morning apt time.   0759: Message sent to [facility] to schedule patient.  0804: Message sent to SW to assist with transportation.  Updated infusion Charge Nurse.

## 2024-11-05 ENCOUNTER — HOSPITAL ENCOUNTER (EMERGENCY)
Facility: CLINIC | Age: 24
Discharge: HOME | End: 2024-11-06
Attending: EMERGENCY MEDICINE | Admitting: EMERGENCY MEDICINE
Payer: COMMERCIAL

## 2024-11-05 DIAGNOSIS — D57.00 SICKLE CELL PAIN CRISIS (H): ICD-10-CM

## 2024-11-05 LAB
ALBUMIN SERPL BCG-MCNC: 4.5 G/DL (ref 3.5–5.2)
ALP SERPL-CCNC: 61 U/L (ref 40–150)
ALT SERPL W P-5'-P-CCNC: 22 U/L (ref 0–50)
ANION GAP SERPL CALCULATED.3IONS-SCNC: 10 MMOL/L (ref 7–15)
AST SERPL W P-5'-P-CCNC: 37 U/L (ref 0–45)
BASOPHILS # BLD AUTO: 0.3 10E3/UL (ref 0–0.2)
BASOPHILS NFR BLD AUTO: 2 %
BILIRUB SERPL-MCNC: 1.9 MG/DL
BITE CELLS BLD QL SMEAR: SLIGHT
BUN SERPL-MCNC: 7 MG/DL (ref 6–20)
CALCIUM SERPL-MCNC: 9 MG/DL (ref 8.8–10.4)
CHLORIDE SERPL-SCNC: 106 MMOL/L (ref 98–107)
CREAT SERPL-MCNC: 0.51 MG/DL (ref 0.51–0.95)
EGFRCR SERPLBLD CKD-EPI 2021: >90 ML/MIN/1.73M2
ELLIPTOCYTES BLD QL SMEAR: SLIGHT
EOSINOPHIL # BLD AUTO: 0.6 10E3/UL (ref 0–0.7)
EOSINOPHIL NFR BLD AUTO: 5 %
ERYTHROCYTE [DISTWIDTH] IN BLOOD BY AUTOMATED COUNT: 22.2 % (ref 10–15)
FRAGMENTS BLD QL SMEAR: SLIGHT
GLUCOSE SERPL-MCNC: 115 MG/DL (ref 70–99)
HCG INTACT+B SERPL-ACNC: <1 MIU/ML
HCO3 SERPL-SCNC: 23 MMOL/L (ref 22–29)
HCT VFR BLD AUTO: 22.4 % (ref 35–47)
HGB BLD-MCNC: 7.6 G/DL (ref 11.7–15.7)
IMM GRANULOCYTES # BLD: 0.1 10E3/UL
IMM GRANULOCYTES NFR BLD: 0 %
LYMPHOCYTES # BLD AUTO: 2.9 10E3/UL (ref 0.8–5.3)
LYMPHOCYTES NFR BLD AUTO: 24 %
MCH RBC QN AUTO: 28.7 PG (ref 26.5–33)
MCHC RBC AUTO-ENTMCNC: 33.9 G/DL (ref 31.5–36.5)
MCV RBC AUTO: 85 FL (ref 78–100)
MONOCYTES # BLD AUTO: 0.7 10E3/UL (ref 0–1.3)
MONOCYTES NFR BLD AUTO: 6 %
NEUTROPHILS # BLD AUTO: 7.4 10E3/UL (ref 1.6–8.3)
NEUTROPHILS NFR BLD AUTO: 62 %
NRBC # BLD AUTO: 0.1 10E3/UL
NRBC BLD AUTO-RTO: 1 /100
PLAT MORPH BLD: ABNORMAL
PLATELET # BLD AUTO: 558 10E3/UL (ref 150–450)
POTASSIUM SERPL-SCNC: 3.6 MMOL/L (ref 3.4–5.3)
PROT SERPL-MCNC: 7.6 G/DL (ref 6.4–8.3)
RBC # BLD AUTO: 2.65 10E6/UL (ref 3.8–5.2)
RBC MORPH BLD: ABNORMAL
RETICS # AUTO: 0.32 10E6/UL (ref 0.03–0.1)
RETICS/RBC NFR AUTO: 12 % (ref 0.5–2)
SICKLE CELLS BLD QL SMEAR: SLIGHT
SODIUM SERPL-SCNC: 139 MMOL/L (ref 135–145)
WBC # BLD AUTO: 11.9 10E3/UL (ref 4–11)

## 2024-11-05 PROCEDURE — 250N000011 HC RX IP 250 OP 636: Performed by: EMERGENCY MEDICINE

## 2024-11-05 PROCEDURE — 96376 TX/PRO/DX INJ SAME DRUG ADON: CPT | Performed by: EMERGENCY MEDICINE

## 2024-11-05 PROCEDURE — 85025 COMPLETE CBC W/AUTO DIFF WBC: CPT | Performed by: EMERGENCY MEDICINE

## 2024-11-05 PROCEDURE — 84702 CHORIONIC GONADOTROPIN TEST: CPT | Performed by: EMERGENCY MEDICINE

## 2024-11-05 PROCEDURE — 99285 EMERGENCY DEPT VISIT HI MDM: CPT | Performed by: EMERGENCY MEDICINE

## 2024-11-05 PROCEDURE — 82310 ASSAY OF CALCIUM: CPT | Performed by: EMERGENCY MEDICINE

## 2024-11-05 PROCEDURE — 96374 THER/PROPH/DIAG INJ IV PUSH: CPT | Performed by: EMERGENCY MEDICINE

## 2024-11-05 PROCEDURE — 99284 EMERGENCY DEPT VISIT MOD MDM: CPT | Mod: 25 | Performed by: EMERGENCY MEDICINE

## 2024-11-05 PROCEDURE — 96375 TX/PRO/DX INJ NEW DRUG ADDON: CPT | Performed by: EMERGENCY MEDICINE

## 2024-11-05 PROCEDURE — 85045 AUTOMATED RETICULOCYTE COUNT: CPT | Performed by: EMERGENCY MEDICINE

## 2024-11-05 PROCEDURE — 36415 COLL VENOUS BLD VENIPUNCTURE: CPT | Performed by: EMERGENCY MEDICINE

## 2024-11-05 RX ORDER — KETOROLAC TROMETHAMINE 30 MG/ML
30 INJECTION, SOLUTION INTRAMUSCULAR; INTRAVENOUS ONCE
Status: COMPLETED | OUTPATIENT
Start: 2024-11-05 | End: 2024-11-05

## 2024-11-05 RX ORDER — ONDANSETRON 2 MG/ML
8 INJECTION INTRAMUSCULAR; INTRAVENOUS ONCE
Status: COMPLETED | OUTPATIENT
Start: 2024-11-05 | End: 2024-11-05

## 2024-11-05 RX ADMIN — HYDROMORPHONE HYDROCHLORIDE 1 MG: 1 INJECTION, SOLUTION INTRAMUSCULAR; INTRAVENOUS; SUBCUTANEOUS at 22:50

## 2024-11-05 RX ADMIN — KETOROLAC TROMETHAMINE 30 MG: 30 INJECTION, SOLUTION INTRAMUSCULAR at 21:12

## 2024-11-05 RX ADMIN — ONDANSETRON 8 MG: 2 INJECTION INTRAMUSCULAR; INTRAVENOUS at 21:12

## 2024-11-05 RX ADMIN — HYDROMORPHONE HYDROCHLORIDE 1 MG: 1 INJECTION, SOLUTION INTRAMUSCULAR; INTRAVENOUS; SUBCUTANEOUS at 23:59

## 2024-11-05 RX ADMIN — HYDROMORPHONE HYDROCHLORIDE 1 MG: 1 INJECTION, SOLUTION INTRAMUSCULAR; INTRAVENOUS; SUBCUTANEOUS at 21:13

## 2024-11-05 ASSESSMENT — ACTIVITIES OF DAILY LIVING (ADL)
ADLS_ACUITY_SCORE: 0

## 2024-11-06 VITALS
SYSTOLIC BLOOD PRESSURE: 106 MMHG | OXYGEN SATURATION: 95 % | HEART RATE: 103 BPM | DIASTOLIC BLOOD PRESSURE: 68 MMHG | RESPIRATION RATE: 16 BRPM | TEMPERATURE: 98.5 F

## 2024-11-06 PROCEDURE — 250N000013 HC RX MED GY IP 250 OP 250 PS 637: Performed by: EMERGENCY MEDICINE

## 2024-11-06 PROCEDURE — 250N000011 HC RX IP 250 OP 636: Performed by: EMERGENCY MEDICINE

## 2024-11-06 RX ORDER — OXYCODONE HYDROCHLORIDE 5 MG/1
15 TABLET ORAL ONCE
Status: COMPLETED | OUTPATIENT
Start: 2024-11-06 | End: 2024-11-06

## 2024-11-06 RX ORDER — ONDANSETRON 8 MG/1
8 TABLET, ORALLY DISINTEGRATING ORAL EVERY 8 HOURS PRN
Qty: 10 TABLET | Refills: 0 | Status: SHIPPED | OUTPATIENT
Start: 2024-11-06 | End: 2024-11-09

## 2024-11-06 RX ORDER — HEPARIN SODIUM (PORCINE) LOCK FLUSH IV SOLN 100 UNIT/ML 100 UNIT/ML
5-10 SOLUTION INTRAVENOUS
Status: DISCONTINUED | OUTPATIENT
Start: 2024-11-06 | End: 2024-11-06 | Stop reason: HOSPADM

## 2024-11-06 RX ADMIN — OXYCODONE HYDROCHLORIDE 15 MG: 5 TABLET ORAL at 01:10

## 2024-11-06 RX ADMIN — HEPARIN 5 ML: 100 SYRINGE at 02:44

## 2024-11-06 ASSESSMENT — ACTIVITIES OF DAILY LIVING (ADL)
ADLS_ACUITY_SCORE: 0

## 2024-11-06 NOTE — DISCHARGE INSTRUCTIONS
Continue current medications.  Follow-up with your hematologist.  Drink plenty of fluids.    Return if fever, cough, worse, or other concerns.

## 2024-11-06 NOTE — ED PROVIDER NOTES
ED Provider Note  [facility]      History     Chief Complaint   Patient presents with    Sickle Cell Pain Crisis     Woke this am with pain all over; took oxycodone and ibuprofen with no relief.     HPI  Jennifer Cervantes is a 24 year old female with PMHx of Sickle Cell Disease HgbSS complicated with history of vaso-occlusive events, history of stroke, iron overload 2/2 to chronic transfusions, asthma, and depression who presents with Sickle Cell Pain crisis.  Patient states that she developed diffuse body pain this morning.  She attributes the cold weather to the exacerbation of her symptoms.  She states that she took ibuprofen and oxycodone at home without significant improvement in her symptoms.  She does follow with hematology and has a sickle cell care plan in place.  She denies any chest pain or dyspnea.  No fever.  No cough.  No abdominal pain.  No nausea or vomiting.  .         Past Medical History  Past Medical History:   Diagnosis Date    Anxiety     Bleeding disorder (H)     Blood clotting disorder (H)     Cerebral infarction (H) 2015    Cognitive developmental delay     low IQ. 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 side (H)     right hand contractures    Iron overload due to repeated red blood cell transfusions     Migraines     Multiple subsegmental pulmonary emboli without acute cor pulmonale (H) 02/01/2021    Oppositional defiant behavior     Presence of intrauterine contraceptive device 2/18/2020    Superficial venous thrombosis of arm, right 03/25/2021    Uncomplicated asthma      Past Surgical History:   Procedure Laterality Date    AS INSERT TUNNELED CV 2 CATH W/O PORT/PUMP      CHOLECYSTECTOMY      CV RIGHT HEART CATH MEASUREMENTS RECORDED N/A 11/18/2021    Procedure: Right Heart Cath;  Surgeon: Jackson Stauffer MD;  Location:  HEART CARDIAC  CATH LAB    INSERT PORT VASCULAR ACCESS Left 4/21/2021    Procedure: INSERTION, VASCULAR ACCESS PORT (NOT SURE ON SIDE UNTIL REMOVAL);  Surgeon: Rajan More MD;  Location: [facility] OR    IR CHEST PORT PLACEMENT > 5 YRS OF AGE  4/21/2021    IR CVC NON TUNNEL LINE REMOVAL  5/6/2021    IR CVC NON TUNNEL PLACEMENT > 5 YRS  04/07/2020    IR CVC NON TUNNEL PLACEMENT > 5 YRS  4/30/2021    IR CVC NON TUNNEL PLACEMENT > 5 YRS  9/7/2022    IR PORT REMOVAL LEFT  4/21/2021    REMOVE PORT VASCULAR ACCESS Left 4/21/2021    Procedure: REMOVAL, VASCULAR ACCESS PORT LEFT;  Surgeon: Rajan More MD;  Location: [facility] OR    REPAIR TENDON ELBOW Right 10/02/2019    Procedure: Right Elbow Flexor Lengthening, Flexor Pronator Slide Of Wrist and Finger, Thumb Adductor Lengthening;  Surgeon: Anai Franco MD;  Location: [facility] OR    TONSILLECTOMY Bilateral 10/02/2019    Procedure: Bilateral Tonsillectomy;  Surgeon: Farhana Guy MD;  Location: [facility] OR    ZZC BREAST REDUCTION (INCLUDES LIPO) TIER 3 Bilateral 04/23/2019     albuterol (PROVENTIL) (2.5 MG/3ML) 0.083% neb solution  aspirin (ASA) 81 MG chewable tablet  budesonide-formoterol (SYMBICORT) 160-4.5 MCG/ACT Inhaler  deferasirox (JADENU) 360 MG tablet  Deferiprone, Twice Daily, 1000 MG TABS  diphenhydrAMINE (BENADRYL) 50 MG capsule  EPINEPHrine (ANY BX GENERIC EQUIV) 0.3 MG/0.3ML injection 2-pack  gabapentin (NEURONTIN) 300 MG capsule  hydroxyurea (HYDREA) 500 MG capsule  ibuprofen (ADVIL/MOTRIN) 800 MG tablet  melatonin 5 MG tablet  methocarbamol (ROBAXIN) 750 MG tablet  methylPREDNISolone (MEDROL) 32 MG tablet  naloxone (NARCAN) 4 MG/0.1ML nasal spray  ondansetron (ZOFRAN ODT) 8 MG ODT tab  oxyCODONE IR (ROXICODONE) 10 MG tablet  pantoprazole (PROTONIX) 40 MG EC tablet      Allergies   Allergen Reactions    Contrast Dye Angioedema     Hives and breathing issues    Fish-Derived Products Hives    Seafood Hives    Adhesive Tape Hives     Primipore dressing causes hives     Gadolinium     Iodinated Contrast Media      Family History  Family History   Problem Relation Age of Onset    Sickle Cell Trait Mother     Hypertension Mother     Asthma Mother     Sickle Cell Trait Father     Glaucoma No family hx of     Macular Degeneration No family hx of     Diabetes No family hx of     Gout No family hx of      Social History   Social History     Tobacco Use    Smoking status: Never     Passive exposure: Never    Smokeless tobacco: Never   Substance Use Topics    Alcohol use: Not Currently     Alcohol/week: 0.0 standard drinks of alcohol    Drug use: Never      A medically appropriate review of systems was performed with pertinent positives and negatives noted in the HPI, and all other systems negative.    Physical Exam   BP: 121/78  Pulse: 104  Temp: 98.4  F (36.9  C)  Resp: 16  SpO2: 95 %  Physical Exam  Vitals and nursing note reviewed.   Constitutional:       General: She is not in acute distress.     Appearance: She is not diaphoretic.   HENT:      Head: Normocephalic and atraumatic.   Eyes:      Extraocular Movements: Extraocular movements intact.      Conjunctiva/sclera: Conjunctivae normal.   Cardiovascular:      Rate and Rhythm: Normal rate.      Heart sounds: Normal heart sounds.   Pulmonary:      Effort: Pulmonary effort is normal. No respiratory distress.      Breath sounds: Normal breath sounds.   Abdominal:      Palpations: Abdomen is soft.      Tenderness: There is no abdominal tenderness.   Musculoskeletal:         General: No tenderness. Normal range of motion.      Cervical back: Normal range of motion and neck supple.   Skin:     General: Skin is warm.      Findings: No rash.   Neurological:      Mental Status: Mental status is at baseline.      Comments: Right hemiplegia           ED Course, Procedures, & Data      Procedures           Results for orders placed or performed during the hospital encounter of 11/05/24   Comprehensive metabolic panel     Status: Abnormal    Result Value Ref Range    Sodium 139 135 - 145 mmol/L    Potassium 3.6 3.4 - 5.3 mmol/L    Carbon Dioxide (CO2) 23 22 - 29 mmol/L    Anion Gap 10 7 - 15 mmol/L    Urea Nitrogen 7.0 6.0 - 20.0 mg/dL    Creatinine 0.51 0.51 - 0.95 mg/dL    GFR Estimate >90 >60 mL/min/1.73m2    Calcium 9.0 8.8 - 10.4 mg/dL    Chloride 106 98 - 107 mmol/L    Glucose 115 (H) 70 - 99 mg/dL    Alkaline Phosphatase 61 40 - 150 U/L    AST 37 0 - 45 U/L    ALT 22 0 - 50 U/L    Protein Total 7.6 6.4 - 8.3 g/dL    Albumin 4.5 3.5 - 5.2 g/dL    Bilirubin Total 1.9 (H) <=1.2 mg/dL   Reticulocyte count     Status: Abnormal   Result Value Ref Range    % Reticulocyte 12.0 (H) 0.5 - 2.0 %    Absolute Reticulocyte 0.318 (H) 0.025 - 0.095 10e6/uL   HCG quantitative pregnancy     Status: Normal   Result Value Ref Range    hCG Quantitative <1 <5 mIU/mL   CBC with platelets and differential     Status: Abnormal   Result Value Ref Range    WBC Count 11.9 (H) 4.0 - 11.0 10e3/uL    RBC Count 2.65 (L) 3.80 - 5.20 10e6/uL    Hemoglobin 7.6 (L) 11.7 - 15.7 g/dL    Hematocrit 22.4 (L) 35.0 - 47.0 %    MCV 85 78 - 100 fL    MCH 28.7 26.5 - 33.0 pg    MCHC 33.9 31.5 - 36.5 g/dL    RDW 22.2 (H) 10.0 - 15.0 %    Platelet Count 558 (H) 150 - 450 10e3/uL    % Neutrophils 62 %    % Lymphocytes 24 %    % Monocytes 6 %    % Eosinophils 5 %    % Basophils 2 %    % Immature Granulocytes 0 %    NRBCs per 100 WBC 1 (H) <1 /100    Absolute Neutrophils 7.4 1.6 - 8.3 10e3/uL    Absolute Lymphocytes 2.9 0.8 - 5.3 10e3/uL    Absolute Monocytes 0.7 0.0 - 1.3 10e3/uL    Absolute Eosinophils 0.6 0.0 - 0.7 10e3/uL    Absolute Basophils 0.3 (H) 0.0 - 0.2 10e3/uL    Absolute Immature Granulocytes 0.1 <=0.4 10e3/uL    Absolute NRBCs 0.1 10e3/uL   RBC and Platelet Morphology     Status: Abnormal   Result Value Ref Range    RBC Morphology Confirmed RBC Indices     Platelet Assessment  Automated Count Confirmed. Platelet morphology is normal.     Automated Count Confirmed. Platelet  morphology is normal.    Bite Cells Slight (A) None Seen    Elliptocytes Slight (A) None Seen    RBC Fragments Slight (A) None Seen    Sickle Cells Slight (A) None Seen   CBC with platelets differential     Status: Abnormal    Narrative    The following orders were created for panel order CBC with platelets differential.  Procedure                               Abnormality         Status                     ---------                               -----------         ------                     CBC with platelets and d...[[id number]]  Abnormal            Final result               RBC and Platelet Morphology[[id number]]  Abnormal            Final result                 Please view results for these tests on the individual orders.         Results for orders placed or performed during the hospital encounter of 11/05/24   Comprehensive metabolic panel     Status: Abnormal   Result Value Ref Range    Sodium 139 135 - 145 mmol/L    Potassium 3.6 3.4 - 5.3 mmol/L    Carbon Dioxide (CO2) 23 22 - 29 mmol/L    Anion Gap 10 7 - 15 mmol/L    Urea Nitrogen 7.0 6.0 - 20.0 mg/dL    Creatinine 0.51 0.51 - 0.95 mg/dL    GFR Estimate >90 >60 mL/min/1.73m2    Calcium 9.0 8.8 - 10.4 mg/dL    Chloride 106 98 - 107 mmol/L    Glucose 115 (H) 70 - 99 mg/dL    Alkaline Phosphatase 61 40 - 150 U/L    AST 37 0 - 45 U/L    ALT 22 0 - 50 U/L    Protein Total 7.6 6.4 - 8.3 g/dL    Albumin 4.5 3.5 - 5.2 g/dL    Bilirubin Total 1.9 (H) <=1.2 mg/dL   Reticulocyte count     Status: Abnormal   Result Value Ref Range    % Reticulocyte 12.0 (H) 0.5 - 2.0 %    Absolute Reticulocyte 0.318 (H) 0.025 - 0.095 10e6/uL   HCG quantitative pregnancy     Status: Normal   Result Value Ref Range    hCG Quantitative <1 <5 mIU/mL   CBC with platelets and differential     Status: Abnormal   Result Value Ref Range    WBC Count 11.9 (H) 4.0 - 11.0 10e3/uL    RBC Count 2.65 (L) 3.80 - 5.20 10e6/uL    Hemoglobin 7.6 (L) 11.7 - 15.7 g/dL    Hematocrit 22.4 (L) 35.0 - 47.0 %     MCV 85 78 - 100 fL    MCH 28.7 26.5 - 33.0 pg    MCHC 33.9 31.5 - 36.5 g/dL    RDW 22.2 (H) 10.0 - 15.0 %    Platelet Count 558 (H) 150 - 450 10e3/uL    % Neutrophils 62 %    % Lymphocytes 24 %    % Monocytes 6 %    % Eosinophils 5 %    % Basophils 2 %    % Immature Granulocytes 0 %    NRBCs per 100 WBC 1 (H) <1 /100    Absolute Neutrophils 7.4 1.6 - 8.3 10e3/uL    Absolute Lymphocytes 2.9 0.8 - 5.3 10e3/uL    Absolute Monocytes 0.7 0.0 - 1.3 10e3/uL    Absolute Eosinophils 0.6 0.0 - 0.7 10e3/uL    Absolute Basophils 0.3 (H) 0.0 - 0.2 10e3/uL    Absolute Immature Granulocytes 0.1 <=0.4 10e3/uL    Absolute NRBCs 0.1 10e3/uL   RBC and Platelet Morphology     Status: Abnormal   Result Value Ref Range    RBC Morphology Confirmed RBC Indices     Platelet Assessment  Automated Count Confirmed. Platelet morphology is normal.     Automated Count Confirmed. Platelet morphology is normal.    Bite Cells Slight (A) None Seen    Elliptocytes Slight (A) None Seen    RBC Fragments Slight (A) None Seen    Sickle Cells Slight (A) None Seen   CBC with platelets differential     Status: Abnormal    Narrative    The following orders were created for panel order CBC with platelets differential.  Procedure                               Abnormality         Status                     ---------                               -----------         ------                     CBC with platelets and d...[[id number]]  Abnormal            Final result               RBC and Platelet Morphology[[id number]]  Abnormal            Final result                 Please view results for these tests on the individual orders.     Medications   oxyCODONE (ROXICODONE) tablet 15 mg (has no administration in time range)   HYDROmorphone (DILAUDID) injection 1 mg (1 mg Intravenous $Given 11/5/24 1522)   ketorolac (TORADOL) injection 30 mg (30 mg Intravenous $Given 11/5/24 2112)   ondansetron (ZOFRAN) injection 8 mg (8 mg Intravenous $Given 11/5/24 2112)     Labs  Ordered and Resulted from Time of ED Arrival to Time of ED Departure   COMPREHENSIVE METABOLIC PANEL - Abnormal       Result Value    Sodium 139      Potassium 3.6      Carbon Dioxide (CO2) 23      Anion Gap 10      Urea Nitrogen 7.0      Creatinine 0.51      GFR Estimate >90      Calcium 9.0      Chloride 106      Glucose 115 (*)     Alkaline Phosphatase 61      AST 37      ALT 22      Protein Total 7.6      Albumin 4.5      Bilirubin Total 1.9 (*)    RETICULOCYTE COUNT - Abnormal    % Reticulocyte 12.0 (*)     Absolute Reticulocyte 0.318 (*)    CBC WITH PLATELETS AND DIFFERENTIAL - Abnormal    WBC Count 11.9 (*)     RBC Count 2.65 (*)     Hemoglobin 7.6 (*)     Hematocrit 22.4 (*)     MCV 85      MCH 28.7      MCHC 33.9      RDW 22.2 (*)     Platelet Count 558 (*)     % Neutrophils 62      % Lymphocytes 24      % Monocytes 6      % Eosinophils 5      % Basophils 2      % Immature Granulocytes 0      NRBCs per 100 WBC 1 (*)     Absolute Neutrophils 7.4      Absolute Lymphocytes 2.9      Absolute Monocytes 0.7      Absolute Eosinophils 0.6      Absolute Basophils 0.3 (*)     Absolute Immature Granulocytes 0.1      Absolute NRBCs 0.1     RBC AND PLATELET MORPHOLOGY - Abnormal    RBC Morphology Confirmed RBC Indices      Platelet Assessment        Value: Automated Count Confirmed. Platelet morphology is normal.    Bite Cells Slight (*)     Elliptocytes Slight (*)     RBC Fragments Slight (*)     Sickle Cells Slight (*)    HCG QUANTITATIVE PREGNANCY - Normal    hCG Quantitative <1       No orders to display          Critical care was not performed.     Medical Decision Making  The patient's presentation was of moderate complexity (a chronic illness mild to moderate exacerbation, progression, or side effect of treatment).    The patient's evaluation involved:  review of external note(s) from 2 sources (previous ED and hematology notes)  review of 3+ test result(s) ordered prior to this encounter (see separate area of  note for details)  ordering and/or review of 3+ test(s) in this encounter (see separate area of note for details)    The patient's management necessitated high risk (a parenteral controlled substance).    Assessment & Plan    Jennifer Cervantes is a 24 year old woman with past medical history notable for Sickle Cell Disease HgbSS, stroke, asthma who presents with Acute Vaso-Occlusive Event. She has a pain plan in place.  The patient's labs are baseline.  She was treated with IV Dilaudid, Toradol, and ondansetron.  She is improved but still having pain.  She was given her home dose of oxycodone.  Anticipate discharge.  Signed out at change of shift.    - CBC w/ platelets and diff  - Reticulocyte count  - CMP  Pain plan:   Dilaudid 1 mg IV q1h up to 3 doses  Toradol 30 mg IV x1   Maintenance IV fluids with LR  Other: Zofran 8 mg IV PRN nausea    I have reviewed the nursing notes. I have reviewed the findings, diagnosis, plan and need for follow up with the patient.    New Prescriptions    No medications on file       Final diagnoses:   Sickle cell pain crisis (H)     Tyler Greenberg, MS4  [facility]           Francesco Green MD  11/06/24 0040

## 2024-11-06 NOTE — ED TRIAGE NOTES
Woke this am with pain all over; took oxycodone and ibuprofen with no relief.     Triage Assessment (Adult)       Row Name 11/05/24 4323          Triage Assessment    Airway WDL WDL        Respiratory WDL    Respiratory WDL WDL        Skin Circulation/Temperature WDL    Skin Circulation/Temperature WDL WDL        Cardiac WDL    Cardiac WDL WDL        Peripheral/Neurovascular WDL    Peripheral Neurovascular WDL WDL        Cognitive/Neuro/Behavioral WDL    Cognitive/Neuro/Behavioral WDL WDL

## 2024-11-06 NOTE — ED PROVIDER NOTES
Emergency Department I-PASS Sign-out      Illness Severity: Stable    Patient Summary:  24 year old female with pertinent PMH of sickle cell who presented with pain crisis    ED Course/treatment plan: ED care plan/home dose of oxycodone    Clinical Impression:  (D57.00) Sickle cell pain crisis (H)      Edited by: Francesco Green MD at 11/6/2024 0042    Action List:  -To do:  Reassess.      Situational Awareness & Contingency Planning:  Code Status (Most recent):  Prior    Disposition:  likely to be discharged    Edited by: Francesco Green MD at 11/6/2024 0042    Synthesis & Events after sign-out:  On repeat assessment the patient states she feels much improved.  She feels she is ready to discharge.  She is encouraged to follow-up, return with any concerns.  She verbalizes understanding and is agreeable to the plan.        Court Ring MD   Emergency Medicine      Court Ring MD  11/06/24 0228

## 2024-11-07 ENCOUNTER — TELEPHONE (OUTPATIENT)
Dept: GASTROENTEROLOGY | Facility: CLINIC | Age: 24
End: 2024-11-07
Payer: COMMERCIAL

## 2024-11-07 RX ORDER — EPINEPHRINE 1 MG/ML
0.3 INJECTION, SOLUTION INTRAMUSCULAR; SUBCUTANEOUS EVERY 5 MIN PRN
OUTPATIENT
Start: 2024-11-08

## 2024-11-07 RX ORDER — MEPERIDINE HYDROCHLORIDE 25 MG/ML
25 INJECTION INTRAMUSCULAR; INTRAVENOUS; SUBCUTANEOUS
OUTPATIENT
Start: 2024-11-08

## 2024-11-07 RX ORDER — ALBUTEROL SULFATE 90 UG/1
1-2 INHALANT RESPIRATORY (INHALATION)
Start: 2024-11-08

## 2024-11-07 RX ORDER — HEPARIN SODIUM (PORCINE) LOCK FLUSH IV SOLN 100 UNIT/ML 100 UNIT/ML
5 SOLUTION INTRAVENOUS
OUTPATIENT
Start: 2024-11-08

## 2024-11-07 RX ORDER — DIPHENHYDRAMINE HYDROCHLORIDE 50 MG/ML
50 INJECTION INTRAMUSCULAR; INTRAVENOUS
Start: 2024-11-08

## 2024-11-07 RX ORDER — DIPHENHYDRAMINE HYDROCHLORIDE 50 MG/ML
25 INJECTION INTRAMUSCULAR; INTRAVENOUS
Start: 2024-11-08

## 2024-11-07 RX ORDER — HEPARIN SODIUM,PORCINE 10 UNIT/ML
5 VIAL (ML) INTRAVENOUS
OUTPATIENT
Start: 2024-11-08

## 2024-11-07 RX ORDER — ALBUTEROL SULFATE 0.83 MG/ML
2.5 SOLUTION RESPIRATORY (INHALATION)
OUTPATIENT
Start: 2024-11-08

## 2024-11-07 RX ORDER — METHYLPREDNISOLONE SODIUM SUCCINATE 40 MG/ML
40 INJECTION INTRAMUSCULAR; INTRAVENOUS
Start: 2024-11-08

## 2024-11-07 NOTE — TELEPHONE ENCOUNTER
"Caller: Patient    Reason for Reschedule/Cancellation   (please be detailed, any staff messages or encounters to note?): Ride issues      Prior to reschedule please review:  Ordering Provider: MALAIKA SILVERIO  Sedation Determined: MAC  Does patient have any ASC Exclusions, please identify?: Low Hgb      Notes on Cancelled Procedure:  Procedure: Upper Endoscopy [EGD]   Date: 11/7/2024  Location: [facility]; [address] [place] [address], [address] Floor, [place] [address]   Surgeon: Say      Rescheduled: Yes,     Endoscopy Scheduling Screen    Have you had any respiratory illness or flu-like symptoms in the last 10 days?  No    What is your communication preference for Instructions and/or Bowel Prep?   MyChart    What insurance is in the chart?  Other:  HP    Ordering/Referring Provider:  MALAIKA SILVERIO   (If ordering provider performs procedure, schedule with ordering provider unless otherwise instructed. )    BMI: Estimated body mass index is 26.61 kg/m  as calculated from the following:    Height as of 11/3/24: 1.626 m (5' 4\").    Weight as of 11/3/24: 70.3 kg (155 lb).     Sedation Ordered  MAC/deep sedation.   BMI<= 45 45 < BMI <= 48 48 < BMI < = 50  BMI > 50   No Restrictions No [facility] ASC  No [facility]  [place] [facility] with exceptions Hospital Only OR Only       Do you have a history of malignant hyperthermia?  No    (Females) Are you currently pregnant?   No     Have you been diagnosed or told you have pulmonary hypertension?   No    Do you have an LVAD?  No    Have you been told you have moderate to severe sleep apnea?  No.    Have you been told you have COPD, asthma, or any other lung disease?  No    Do you have any heart conditions?  No     Have you ever had or are you waiting for an organ transplant?  No. Continue scheduling, no site restrictions.    Have you had a stroke or transient ischemic attack (TIA aka \"mini stroke\" in the last 6 months?   No    Have you been diagnosed with or been told you have " "cirrhosis of the liver?   No.    Are you currently on dialysis?   No    Do you need assistance transferring?   No    BMI: Estimated body mass index is 26.61 kg/m  as calculated from the following:    Height as of 11/3/24: 1.626 m (5' 4\").    Weight as of 11/3/24: 70.3 kg (155 lb).     Is patients BMI > 40 and scheduling location [facility]?  No    Do you take an injectable or oral medication for weight loss or diabetes (excluding insulin)?  No    Do you take the medication Naltrexone?  No    Do you take blood thinners?  No       Prep   Are you currently on dialysis or do you have chronic kidney disease?  No    Do you have a diagnosis of diabetes?  No    Do you have a diagnosis of cystic fibrosis (CF)?  No    On a regular basis do you go 3 -5 days between bowel movements?  No    BMI > 40?  No    Preferred Pharmacy:    [address] [address]  Phone: [phone] Fax: [phone] Alternate Fax: [phone], [phone]    Final Scheduling Details     Procedure scheduled  Upper endoscopy (EGD)    Surgeon:  JANAE     Date of procedure:  12/12/2024     Pre-OP / PAC:   No - Not required for this site.    Location  [facility]  low Hgb    Sedation   MAC/Deep Sedation - Per order.      Patient Reminders:   You will receive a call from a Nurse to review instructions and health history.  This assessment must be completed prior to your procedure.  Failure to complete the Nurse assessment may result in the procedure being cancelled.      On the day of your procedure, please designate an adult(s) who can drive you home stay with you for the next 24 hours. The medicines used in the exam will make you sleepy. You will not be able to drive.      You cannot take public transportation, ride share services, or non-medical taxi service without a responsible caregiver.  Medical transport services are allowed with the requirement that a " responsible caregiver will receive you at your destination.  We require that drivers and caregivers are confirmed prior to your procedure.

## 2024-11-08 ENCOUNTER — APPOINTMENT (OUTPATIENT)
Dept: LAB | Facility: CLINIC | Age: 24
End: 2024-11-08
Attending: REGISTERED NURSE
Payer: COMMERCIAL

## 2024-11-08 ENCOUNTER — NURSE TRIAGE (OUTPATIENT)
Dept: ONCOLOGY | Facility: CLINIC | Age: 24
End: 2024-11-08
Payer: COMMERCIAL

## 2024-11-08 ENCOUNTER — INFUSION THERAPY VISIT (OUTPATIENT)
Dept: ONCOLOGY | Facility: CLINIC | Age: 24
End: 2024-11-08
Attending: REGISTERED NURSE
Payer: COMMERCIAL

## 2024-11-08 VITALS
TEMPERATURE: 97.7 F | HEART RATE: 97 BPM | SYSTOLIC BLOOD PRESSURE: 122 MMHG | DIASTOLIC BLOOD PRESSURE: 83 MMHG | BODY MASS INDEX: 27.45 KG/M2 | RESPIRATION RATE: 18 BRPM | WEIGHT: 159.9 LBS | OXYGEN SATURATION: 100 %

## 2024-11-08 DIAGNOSIS — G81.10 SPASTIC HEMIPLEGIA, UNSPECIFIED ETIOLOGY, UNSPECIFIED LATERALITY (H): ICD-10-CM

## 2024-11-08 DIAGNOSIS — D57.00 SICKLE CELL PAIN CRISIS (H): Primary | ICD-10-CM

## 2024-11-08 LAB
ANION GAP SERPL CALCULATED.3IONS-SCNC: 9 MMOL/L (ref 7–15)
BASOPHILS # BLD AUTO: 0.3 10E3/UL (ref 0–0.2)
BASOPHILS NFR BLD AUTO: 2 %
BUN SERPL-MCNC: 6.2 MG/DL (ref 6–20)
CALCIUM SERPL-MCNC: 8.9 MG/DL (ref 8.8–10.4)
CHLORIDE SERPL-SCNC: 105 MMOL/L (ref 98–107)
CREAT SERPL-MCNC: 0.49 MG/DL (ref 0.51–0.95)
EGFRCR SERPLBLD CKD-EPI 2021: >90 ML/MIN/1.73M2
EOSINOPHIL # BLD AUTO: 0.7 10E3/UL (ref 0–0.7)
EOSINOPHIL NFR BLD AUTO: 6 %
ERYTHROCYTE [DISTWIDTH] IN BLOOD BY AUTOMATED COUNT: 22.4 % (ref 10–15)
GLUCOSE SERPL-MCNC: 90 MG/DL (ref 70–99)
HCO3 SERPL-SCNC: 22 MMOL/L (ref 22–29)
HCT VFR BLD AUTO: 22.8 % (ref 35–47)
HGB BLD-MCNC: 7.5 G/DL (ref 11.7–15.7)
IMM GRANULOCYTES # BLD: 0.1 10E3/UL
IMM GRANULOCYTES NFR BLD: 1 %
LYMPHOCYTES # BLD AUTO: 2.1 10E3/UL (ref 0.8–5.3)
LYMPHOCYTES NFR BLD AUTO: 17 %
MCH RBC QN AUTO: 28.4 PG (ref 26.5–33)
MCHC RBC AUTO-ENTMCNC: 32.9 G/DL (ref 31.5–36.5)
MCV RBC AUTO: 86 FL (ref 78–100)
MONOCYTES # BLD AUTO: 0.7 10E3/UL (ref 0–1.3)
MONOCYTES NFR BLD AUTO: 6 %
NEUTROPHILS # BLD AUTO: 8.3 10E3/UL (ref 1.6–8.3)
NEUTROPHILS NFR BLD AUTO: 69 %
NRBC # BLD AUTO: 0.1 10E3/UL
NRBC BLD AUTO-RTO: 1 /100
PLATELET # BLD AUTO: 541 10E3/UL (ref 150–450)
POTASSIUM SERPL-SCNC: 3.9 MMOL/L (ref 3.4–5.3)
RBC # BLD AUTO: 2.64 10E6/UL (ref 3.8–5.2)
RETICS # AUTO: 0.4 10E6/UL (ref 0.03–0.1)
RETICS/RBC NFR AUTO: 15.3 % (ref 0.5–2)
SODIUM SERPL-SCNC: 136 MMOL/L (ref 135–145)
WBC # BLD AUTO: 12.1 10E3/UL (ref 4–11)

## 2024-11-08 PROCEDURE — 96365 THER/PROPH/DIAG IV INF INIT: CPT

## 2024-11-08 PROCEDURE — 258N000003 HC RX IP 258 OP 636: Performed by: PEDIATRICS

## 2024-11-08 PROCEDURE — 96361 HYDRATE IV INFUSION ADD-ON: CPT

## 2024-11-08 PROCEDURE — 96413 CHEMO IV INFUSION 1 HR: CPT

## 2024-11-08 PROCEDURE — 258N000003 HC RX IP 258 OP 636: Performed by: REGISTERED NURSE

## 2024-11-08 PROCEDURE — 82947 ASSAY GLUCOSE BLOOD QUANT: CPT | Performed by: PEDIATRICS

## 2024-11-08 PROCEDURE — 250N000011 HC RX IP 250 OP 636: Performed by: PEDIATRICS

## 2024-11-08 PROCEDURE — 250N000013 HC RX MED GY IP 250 OP 250 PS 637: Performed by: PEDIATRICS

## 2024-11-08 PROCEDURE — 96376 TX/PRO/DX INJ SAME DRUG ADON: CPT

## 2024-11-08 PROCEDURE — 36591 DRAW BLOOD OFF VENOUS DEVICE: CPT | Performed by: PEDIATRICS

## 2024-11-08 PROCEDURE — 85045 AUTOMATED RETICULOCYTE COUNT: CPT | Performed by: PEDIATRICS

## 2024-11-08 PROCEDURE — 80048 BASIC METABOLIC PNL TOTAL CA: CPT | Performed by: PEDIATRICS

## 2024-11-08 PROCEDURE — 96375 TX/PRO/DX INJ NEW DRUG ADDON: CPT

## 2024-11-08 PROCEDURE — 85025 COMPLETE CBC W/AUTO DIFF WBC: CPT | Performed by: PEDIATRICS

## 2024-11-08 RX ORDER — ONDANSETRON 2 MG/ML
8 INJECTION INTRAMUSCULAR; INTRAVENOUS EVERY 6 HOURS PRN
Status: DISCONTINUED | OUTPATIENT
Start: 2024-11-08 | End: 2024-11-08 | Stop reason: HOSPADM

## 2024-11-08 RX ORDER — ONDANSETRON 4 MG/1
8 TABLET, FILM COATED ORAL
Status: CANCELLED
Start: 2025-01-01

## 2024-11-08 RX ORDER — KETOROLAC TROMETHAMINE 30 MG/ML
30 INJECTION, SOLUTION INTRAMUSCULAR; INTRAVENOUS ONCE
Status: CANCELLED
Start: 2025-01-01 | End: 2025-01-01

## 2024-11-08 RX ORDER — HEPARIN SODIUM (PORCINE) LOCK FLUSH IV SOLN 100 UNIT/ML 100 UNIT/ML
5 SOLUTION INTRAVENOUS ONCE
Status: COMPLETED | OUTPATIENT
Start: 2024-11-08 | End: 2024-11-08

## 2024-11-08 RX ORDER — DIPHENHYDRAMINE HCL 25 MG
50 CAPSULE ORAL
Status: CANCELLED
Start: 2025-01-01

## 2024-11-08 RX ORDER — HEPARIN SODIUM,PORCINE 10 UNIT/ML
5 VIAL (ML) INTRAVENOUS
Status: CANCELLED | OUTPATIENT
Start: 2025-01-01

## 2024-11-08 RX ORDER — HEPARIN SODIUM (PORCINE) LOCK FLUSH IV SOLN 100 UNIT/ML 100 UNIT/ML
5 SOLUTION INTRAVENOUS
Status: CANCELLED | OUTPATIENT
Start: 2025-01-01

## 2024-11-08 RX ORDER — DIPHENHYDRAMINE HCL 25 MG
50 CAPSULE ORAL
Status: COMPLETED | OUTPATIENT
Start: 2024-11-08 | End: 2024-11-08

## 2024-11-08 RX ORDER — KETOROLAC TROMETHAMINE 30 MG/ML
30 INJECTION, SOLUTION INTRAMUSCULAR; INTRAVENOUS ONCE
Status: COMPLETED | OUTPATIENT
Start: 2024-11-08 | End: 2024-11-08

## 2024-11-08 RX ORDER — ONDANSETRON 2 MG/ML
8 INJECTION INTRAMUSCULAR; INTRAVENOUS EVERY 6 HOURS PRN
Status: CANCELLED
Start: 2025-01-01

## 2024-11-08 RX ORDER — HEPARIN SODIUM (PORCINE) LOCK FLUSH IV SOLN 100 UNIT/ML 100 UNIT/ML
5 SOLUTION INTRAVENOUS
Status: DISCONTINUED | OUTPATIENT
Start: 2024-11-08 | End: 2024-11-08 | Stop reason: HOSPADM

## 2024-11-08 RX ADMIN — HEPARIN SODIUM (PORCINE) LOCK FLUSH IV SOLN 100 UNIT/ML 5 ML: 100 SOLUTION at 12:44

## 2024-11-08 RX ADMIN — HEPARIN 5 ML: 100 SYRINGE at 15:25

## 2024-11-08 RX ADMIN — DIPHENHYDRAMINE HYDROCHLORIDE 50 MG: 25 CAPSULE ORAL at 12:54

## 2024-11-08 RX ADMIN — HYDROMORPHONE HYDROCHLORIDE 1 MG: 1 INJECTION, SOLUTION INTRAMUSCULAR; INTRAVENOUS; SUBCUTANEOUS at 15:01

## 2024-11-08 RX ADMIN — SODIUM CHLORIDE 1000 ML: 9 INJECTION, SOLUTION INTRAVENOUS at 12:53

## 2024-11-08 RX ADMIN — ONDANSETRON 8 MG: 2 INJECTION INTRAMUSCULAR; INTRAVENOUS at 14:02

## 2024-11-08 RX ADMIN — HYDROMORPHONE HYDROCHLORIDE 1 MG: 1 INJECTION, SOLUTION INTRAMUSCULAR; INTRAVENOUS; SUBCUTANEOUS at 13:58

## 2024-11-08 RX ADMIN — SODIUM CHLORIDE 363 MG: 9 INJECTION, SOLUTION INTRAVENOUS at 13:22

## 2024-11-08 RX ADMIN — HYDROMORPHONE HYDROCHLORIDE 1 MG: 1 INJECTION, SOLUTION INTRAMUSCULAR; INTRAVENOUS; SUBCUTANEOUS at 12:55

## 2024-11-08 RX ADMIN — KETOROLAC TROMETHAMINE 30 MG: 30 INJECTION, SOLUTION INTRAMUSCULAR; INTRAVENOUS at 12:57

## 2024-11-08 ASSESSMENT — PAIN SCALES - GENERAL: PAINLEVEL_OUTOF10: EXTREME PAIN (8)

## 2024-11-08 NOTE — PROGRESS NOTES
Infusion Nursing Note:  Jennifer Cervantes presents today for ***.    Patient seen by provider today: {YES (EXPLAIN)/NO:520104}   present during visit today: {UMMARYBETHGE:926482}    Note: {Not Applicable or free text:218530:s}.      Intravenous Access:  {UMHIVACCESS:191485}    Treatment Conditions:  Biological Infusion Checklist:  ~~~ NOTE: If the patient answers yes to any of the questions below, hold the infusion and contact ordering provider or on-call provider.    Have you recently had an elevated temperature, fever, chills, productive cough, coughing for 3 weeks or longer or hemoptysis,  abnormal vital signs, night sweats,  chest pain or have you noticed a decrease in your appetite, unexplained weight loss or fatigue? No  Do you have any open wounds or new incisions? No  Do you have any upcoming hospitalizations or surgeries? Does not include esophagogastroduodenoscopy, colonoscopy, endoscopic retrograde cholangiopancreatography (ERCP), endoscopic ultrasound (EUS), dental procedures or joint aspiration/steroid injections No  Do you currently have any signs of illness or infection or are you on any antibiotics? No  Have you had any new, sudden or worsening abdominal pain? No  Have you or anyone in your household received a live vaccination in the past 4 weeks? Please note: No live vaccines while on biologic/chemotherapy until 6 months after the last treatment. Patient can receive the flu vaccine (shot only), pneumovax and the Covid vaccine. It is optimal for the patient to get these vaccines mid cycle, but they can be given at any time as long as it is not on the day of the infusion. No  Have you recently been diagnosed with any new nervous system diseases (ie. Multiple sclerosis, Guillain [place], seizures, neurological changes) or cancer diagnosis? Are you on any form of radiation or chemotherapy? No  Are you pregnant or breast feeding or do you have plans of pregnancy in the future? No  Have you been  having any signs of worsening depression or suicidal ideations?  (benlysta only) No  Have there been any other new onset medical symptoms? No  Have you had any new blood clots? (IVIG only) No      Post Infusion Assessment:  {UMHPOSTINFUSION:006754}       Discharge Plan:   {UMHDISCHARGE:037403}      Earnestine Hale RN

## 2024-11-08 NOTE — TELEPHONE ENCOUNTER
Oncology Nurse Triage - Sickle Cell Pain Crisis:  Situation: Jennifer  calling about Sickle Cell Pain Crisis, requesting to add on IV fluids and pain medicine to [facility]    Background:   Patient's last infusion was 11/5/24 in ED  Last clinic visit date:10/31/24 w/Patricia Mantilla CNP  Does patient have active treatment plan?  Yes    Assessment of Symptoms:  Onset/Duration of symptoms: 2 day    Is it typical sickle cell pain? Yes   Location: everywhere  Character: Sharp           Intensity: 8/10    Any radiation of pain, numbness, tingling, weakness, warmth, swelling, discoloration of arms or legs?No     Fever?No  Chest Pain Present: No   Shortness of breath: No     Other home therapies tried: HEAT/HEATING PAD and WARM BATH   Last home medication taken and when: yesterday, oxycodone    Any Refills Needed?: No     Does patient have transportation & length of time to get to clinic: Yes   Wants to add on IVF/pm to [facility]    Recommendations:   If you do not hear from the infusion center by 2pm then you will not be able to get in for an infusion today. If symptoms worsen while waiting for call back, and/or you experience fever, chills, SOB, chest pain, cough, n/v, dizziness, numbness, swelling, discoloration of extremities, then seek emergency evaluation in Emergency Department.     Added to infusion wait list.    0739: Secure chat sent to Patricia Mantilla CNP to ask if okay to add on IVF/pm to Jr. Infusion center can add on if okay'd by provider.  Per Patricia, Ok to add on IVF/pm to JR.  Called Jennifer to let her know okay to add on IVF/pm to Jr today.  Pt voiced understanding.  Updated infusion charge nurse.  Appointment notes updated.    Message routed to Care Team.

## 2024-11-08 NOTE — PROGRESS NOTES
"Infusion Nursing Note:  Jennifer Cervantes presents today for Jr/IV Fluids and Pain Meds.    Patient seen by provider today: No   present during visit today: Not Applicable.    Note: Reported feeling \"crummy\" due to pain and a cough. Cough developed about a week ago. Reported that cough comes and goes. It is not productive but sounds \"cruddy.\" Denied any SOB, fevers, chills, or chest pain. Did not feel she needs intervention for her cough and would like to continue to monitor it. Educated patient that she can use mucinex over the counter. Encouraged her to reach out to clinic if cough doesn't improve, worsens, or if she would like additional interventions. Verbalized understanding.    Pain 9/10 upon arrival. Denied pain in one specific area, instead reported it is generalized. Received IV dilaudid x 3 and IV toradol. Pain 5/10 at time of discharge. Patient requested to discharge home and continue to monitor her pain.    Per written communication with Patricia Mantilla CNP/Tammie Hale RN 11/08/24 @ 1236  - okay to give 1L NS instead of LR d/t jr compatibility and national IV fluid shortage    Intravenous Access:  Implanted Port.    Treatment Conditions:  Biological Infusion Checklist:  ~~~ NOTE: If the patient answers yes to any of the questions below, hold the infusion and contact ordering provider or on-call provider.    Have you recently had an elevated temperature, fever, chills, productive cough, coughing for 3 weeks or longer or hemoptysis,  abnormal vital signs, night sweats,  chest pain or have you noticed a decrease in your appetite, unexplained weight loss or fatigue? No  Do you have any open wounds or new incisions? No  Do you have any upcoming hospitalizations or surgeries? Does not include esophagogastroduodenoscopy, colonoscopy, endoscopic retrograde cholangiopancreatography (ERCP), endoscopic ultrasound (EUS), dental procedures or joint aspiration/steroid injections No  Do you currently have any " signs of illness or infection or are you on any antibiotics? No  Have you had any new, sudden or worsening abdominal pain? No  Have you or anyone in your household received a live vaccination in the past 4 weeks? Please note: No live vaccines while on biologic/chemotherapy until 6 months after the last treatment. Patient can receive the flu vaccine (shot only), pneumovax and the Covid vaccine. It is optimal for the patient to get these vaccines mid cycle, but they can be given at any time as long as it is not on the day of the infusion. No  Have you recently been diagnosed with any new nervous system diseases (ie. Multiple sclerosis, Guillain [place], seizures, neurological changes) or cancer diagnosis? Are you on any form of radiation or chemotherapy? No  Are you pregnant or breast feeding or do you have plans of pregnancy in the future? No  Have you been having any signs of worsening depression or suicidal ideations?  (benlysta only) No  Have there been any other new onset medical symptoms? No  Have you had any new blood clots? (IVIG only) No   Latest Reference Range & Units 11/08/24 12:40   Sodium 135 - 145 mmol/L 136   Potassium 3.4 - 5.3 mmol/L 3.9   Chloride 98 - 107 mmol/L 105   Carbon Dioxide (CO2) 22 - 29 mmol/L 22   Urea Nitrogen 6.0 - 20.0 mg/dL 6.2   Creatinine 0.51 - 0.95 mg/dL 0.49 (L)   GFR Estimate >60 mL/min/1.73m2 >90   Calcium 8.8 - 10.4 mg/dL 8.9   Anion Gap 7 - 15 mmol/L 9   Glucose 70 - 99 mg/dL 90   WBC 4.0 - 11.0 10e3/uL 12.1 (H)   Hemoglobin 11.7 - 15.7 g/dL 7.5 (L)   Hematocrit 35.0 - 47.0 % 22.8 (L)   Platelet Count 150 - 450 10e3/uL 541 (H)   RBC Count 3.80 - 5.20 10e6/uL 2.64 (L)   MCV 78 - 100 fL 86   MCH 26.5 - 33.0 pg 28.4   MCHC 31.5 - 36.5 g/dL 32.9   RDW 10.0 - 15.0 % 22.4 (H)   % Neutrophils % 69   % Lymphocytes % 17   % Monocytes % 6   % Eosinophils % 6   % Basophils % 2   Absolute Basophils 0.0 - 0.2 10e3/uL 0.3 (H)   Absolute Eosinophils 0.0 - 0.7 10e3/uL 0.7   Absolute Immature  Granulocytes <=0.4 10e3/uL 0.1   Absolute Lymphocytes 0.8 - 5.3 10e3/uL 2.1   Absolute Monocytes 0.0 - 1.3 10e3/uL 0.7   % Immature Granulocytes % 1   Absolute Neutrophils 1.6 - 8.3 10e3/uL 8.3   Absolute NRBCs 10e3/uL 0.1   NRBCs per 100 WBC <1 /100 1 (H)       Post Infusion Assessment:  Patient tolerated infusion without incident.  Patient observed for 30 minutes post sidney per protocol.  Blood return noted pre and post infusion.  Site patent and intact, free from redness, edema or discomfort.  No evidence of extravasations.  Access discontinued per protocol.       Discharge Plan:   Discharge instructions reviewed with: Patient.  Patient and/or family verbalized understanding of discharge instructions and all questions answered.  AVS to patient via [employer] [facility].  Patient will return 12/5 for next appointment.   Patient discharged in stable condition accompanied by: self.  Departure Mode: Ambulatory.      Earnestine Hale RN

## 2024-11-08 NOTE — NURSING NOTE
"Chief Complaint   Patient presents with    Infusion     Jr/IV Fluids and Pain Meds    Port Draw     Labs drawn via port by RN. VS taken.     Port accessed with 20 gauge, 3/4\" power needle by RN, labs collected, line flushed with saline and heparin.  Vitals taken. Pt checked in for appointment(s).     Ashley Robles RN    "

## 2024-11-08 NOTE — PATIENT INSTRUCTIONS
Biologic Infusion Post Education:     Call the triage nurse at your clinic ([phone]) or seek medical attention if you have chills and/or temperature greater than or equal to 100.5, uncontrolled nausea/vomiting, diarrhea, constipation, dizziness, shortness of breath, chest pain, heart palpitations, weakness or any other new or concerning symptoms, questions or concerns.    You cannot have any live virus vaccines prior to or during treatment or up to 6 months post infusion.  If you have an upcoming surgery, medical procedure or dental procedure during treatment, this should be discussed with your ordering physician and your surgeon/dentist.    If you are having any concerning symptom, if you are unsure if you should get your next infusion or wish to speak to a provider before your next infusion, please call your care coordinator or triage nurse at your clinic to notify them so we can adequately serve you.

## 2024-11-09 ENCOUNTER — HOSPITAL ENCOUNTER (EMERGENCY)
Facility: CLINIC | Age: 24
Discharge: HOME | End: 2024-11-09
Attending: EMERGENCY MEDICINE | Admitting: EMERGENCY MEDICINE
Payer: COMMERCIAL

## 2024-11-09 VITALS
TEMPERATURE: 98.2 F | BODY MASS INDEX: 26.49 KG/M2 | RESPIRATION RATE: 18 BRPM | SYSTOLIC BLOOD PRESSURE: 119 MMHG | HEART RATE: 68 BPM | HEIGHT: 65 IN | OXYGEN SATURATION: 96 % | DIASTOLIC BLOOD PRESSURE: 69 MMHG | WEIGHT: 159 LBS

## 2024-11-09 DIAGNOSIS — D57.00 SICKLE CELL DISEASE WITH CRISIS (H): ICD-10-CM

## 2024-11-09 LAB
ALBUMIN SERPL BCG-MCNC: 4.4 G/DL (ref 3.5–5.2)
ALP SERPL-CCNC: 62 U/L (ref 40–150)
ALT SERPL W P-5'-P-CCNC: 44 U/L (ref 0–50)
ANION GAP SERPL CALCULATED.3IONS-SCNC: 10 MMOL/L (ref 7–15)
AST SERPL W P-5'-P-CCNC: 45 U/L (ref 0–45)
BASOPHILS # BLD AUTO: 0.2 10E3/UL (ref 0–0.2)
BASOPHILS NFR BLD AUTO: 1 %
BILIRUB SERPL-MCNC: 1.8 MG/DL
BUN SERPL-MCNC: 6.7 MG/DL (ref 6–20)
CALCIUM SERPL-MCNC: 8.9 MG/DL (ref 8.8–10.4)
CHLORIDE SERPL-SCNC: 105 MMOL/L (ref 98–107)
CREAT SERPL-MCNC: 0.52 MG/DL (ref 0.51–0.95)
EGFRCR SERPLBLD CKD-EPI 2021: >90 ML/MIN/1.73M2
EOSINOPHIL # BLD AUTO: 0.5 10E3/UL (ref 0–0.7)
EOSINOPHIL NFR BLD AUTO: 4 %
ERYTHROCYTE [DISTWIDTH] IN BLOOD BY AUTOMATED COUNT: 21.8 % (ref 10–15)
GLUCOSE SERPL-MCNC: 88 MG/DL (ref 70–99)
HCO3 SERPL-SCNC: 24 MMOL/L (ref 22–29)
HCT VFR BLD AUTO: 22 % (ref 35–47)
HGB BLD-MCNC: 7.4 G/DL (ref 11.7–15.7)
IMM GRANULOCYTES # BLD: 0 10E3/UL
IMM GRANULOCYTES NFR BLD: 0 %
LYMPHOCYTES # BLD AUTO: 1.8 10E3/UL (ref 0.8–5.3)
LYMPHOCYTES NFR BLD AUTO: 16 %
MCH RBC QN AUTO: 29 PG (ref 26.5–33)
MCHC RBC AUTO-ENTMCNC: 33.6 G/DL (ref 31.5–36.5)
MCV RBC AUTO: 86 FL (ref 78–100)
MONOCYTES # BLD AUTO: 0.8 10E3/UL (ref 0–1.3)
MONOCYTES NFR BLD AUTO: 7 %
NEUTROPHILS # BLD AUTO: 7.8 10E3/UL (ref 1.6–8.3)
NEUTROPHILS NFR BLD AUTO: 71 %
NRBC # BLD AUTO: 0.1 10E3/UL
NRBC BLD AUTO-RTO: 1 /100
PLATELET # BLD AUTO: 493 10E3/UL (ref 150–450)
POTASSIUM SERPL-SCNC: 4.3 MMOL/L (ref 3.4–5.3)
PROT SERPL-MCNC: 7.5 G/DL (ref 6.4–8.3)
RBC # BLD AUTO: 2.55 10E6/UL (ref 3.8–5.2)
RETICS # AUTO: 0.55 10E6/UL (ref 0.03–0.1)
RETICS/RBC NFR AUTO: 15.4 % (ref 0.5–2)
SODIUM SERPL-SCNC: 139 MMOL/L (ref 135–145)
WBC # BLD AUTO: 11 10E3/UL (ref 4–11)

## 2024-11-09 PROCEDURE — 96376 TX/PRO/DX INJ SAME DRUG ADON: CPT | Performed by: EMERGENCY MEDICINE

## 2024-11-09 PROCEDURE — 250N000011 HC RX IP 250 OP 636: Performed by: EMERGENCY MEDICINE

## 2024-11-09 PROCEDURE — 85004 AUTOMATED DIFF WBC COUNT: CPT | Performed by: EMERGENCY MEDICINE

## 2024-11-09 PROCEDURE — 96374 THER/PROPH/DIAG INJ IV PUSH: CPT | Performed by: EMERGENCY MEDICINE

## 2024-11-09 PROCEDURE — 85045 AUTOMATED RETICULOCYTE COUNT: CPT | Performed by: EMERGENCY MEDICINE

## 2024-11-09 PROCEDURE — 99285 EMERGENCY DEPT VISIT HI MDM: CPT | Performed by: EMERGENCY MEDICINE

## 2024-11-09 PROCEDURE — 36415 COLL VENOUS BLD VENIPUNCTURE: CPT | Performed by: EMERGENCY MEDICINE

## 2024-11-09 PROCEDURE — 99284 EMERGENCY DEPT VISIT MOD MDM: CPT | Performed by: EMERGENCY MEDICINE

## 2024-11-09 PROCEDURE — 82310 ASSAY OF CALCIUM: CPT | Performed by: EMERGENCY MEDICINE

## 2024-11-09 PROCEDURE — 96375 TX/PRO/DX INJ NEW DRUG ADDON: CPT | Performed by: EMERGENCY MEDICINE

## 2024-11-09 RX ORDER — ONDANSETRON 2 MG/ML
8 INJECTION INTRAMUSCULAR; INTRAVENOUS
Status: COMPLETED | OUTPATIENT
Start: 2024-11-09 | End: 2024-11-09

## 2024-11-09 RX ORDER — KETOROLAC TROMETHAMINE 30 MG/ML
30 INJECTION, SOLUTION INTRAMUSCULAR; INTRAVENOUS ONCE
Status: COMPLETED | OUTPATIENT
Start: 2024-11-09 | End: 2024-11-09

## 2024-11-09 RX ORDER — HEPARIN SODIUM,PORCINE 10 UNIT/ML
5-10 VIAL (ML) INTRAVENOUS EVERY 24 HOURS
Status: DISCONTINUED | OUTPATIENT
Start: 2024-11-09 | End: 2024-11-10 | Stop reason: HOSPADM

## 2024-11-09 RX ORDER — HEPARIN SODIUM (PORCINE) LOCK FLUSH IV SOLN 100 UNIT/ML 100 UNIT/ML
5-10 SOLUTION INTRAVENOUS
Status: DISCONTINUED | OUTPATIENT
Start: 2024-11-09 | End: 2024-11-10 | Stop reason: HOSPADM

## 2024-11-09 RX ADMIN — KETOROLAC TROMETHAMINE 30 MG: 30 INJECTION, SOLUTION INTRAMUSCULAR at 17:43

## 2024-11-09 RX ADMIN — HYDROMORPHONE HYDROCHLORIDE 1 MG: 1 INJECTION, SOLUTION INTRAMUSCULAR; INTRAVENOUS; SUBCUTANEOUS at 17:42

## 2024-11-09 RX ADMIN — HYDROMORPHONE HYDROCHLORIDE 1 MG: 1 INJECTION, SOLUTION INTRAMUSCULAR; INTRAVENOUS; SUBCUTANEOUS at 20:44

## 2024-11-09 RX ADMIN — HEPARIN 5 ML: 100 SYRINGE at 22:21

## 2024-11-09 RX ADMIN — ONDANSETRON 8 MG: 2 INJECTION INTRAMUSCULAR; INTRAVENOUS at 17:43

## 2024-11-09 RX ADMIN — HYDROMORPHONE HYDROCHLORIDE 1 MG: 1 INJECTION, SOLUTION INTRAMUSCULAR; INTRAVENOUS; SUBCUTANEOUS at 19:18

## 2024-11-09 ASSESSMENT — ACTIVITIES OF DAILY LIVING (ADL)
ADLS_ACUITY_SCORE: 0

## 2024-11-09 NOTE — ED TRIAGE NOTES
Generalized pain. Took home dose of oxycodone without relief. Had sidney infusion yesterday which she feels may be contributing.     Triage Assessment (Adult)       Row Name 11/09/24 1528          Triage Assessment    Airway WDL WDL        Respiratory WDL    Respiratory WDL WDL        Skin Circulation/Temperature WDL    Skin Circulation/Temperature WDL WDL        Cardiac WDL    Cardiac WDL WDL        Peripheral/Neurovascular WDL    Peripheral Neurovascular WDL WDL        Cognitive/Neuro/Behavioral WDL    Cognitive/Neuro/Behavioral WDL WDL

## 2024-11-09 NOTE — ED PROVIDER NOTES
ED Provider Note  [facility]      History     Chief Complaint   Patient presents with    Sickle Cell Pain Crisis     Generalized pain. Took home dose of oxycodone without relief. Had sidney infusion yesterday which she feels may be contributing.     SHEILA Cervantes is a 24 year old female who has a history of sickle cell disease complicated by frequent pain crises, history of stroke leading to cognitive delays and right upper extremity hemiparesis, iron overload secondary to chronic transfusions, anxiety/depression, asthma, who presents to the emergency department today complaining of generalized body pain.  Patient states that she received her crizanlizumab infusion in clinic yesterday, and occasionally after these infusions this will precipitate a pain flare for her.  She woke up today and developed pain generalized throughout her whole body, denies any focal spot that seems to be bothering her.  Denies any fevers or chills, no nasal congestion or sore throat, no cough, shortness of breath, or chest pain.  No abdominal pain, nausea, vomiting, or diarrhea.    Patient does see her hematology clinic on a regular basis, she states that her clinic is attempting to down taper her oxycodone.  As a result, she is currently taking 2 or 3 10 mg tablets per day, has taken 2 10 mg tablets today.  She has also used her gabapentin and ibuprofen and does not feel that this has been helpful for her.    Her ED visits have been increasing lately, she has had 10 ED visits in the past 30 days.  She also frequently attends the infusion clinic for outpatient IV Dilaudid per her care plan.    I do see that the last time she saw her hematology clinic was 10/31/2024 where they were discussing continuing to try to down taper her oxycodone.  They do believe that she is trying to minimize her use of the emergency department and infusion clinic.    Past Medical History:   Diagnosis Date    Anxiety     Bleeding  disorder (H)     Blood clotting disorder (H)     Cerebral infarction (H) 2015    Cognitive developmental delay     low IQ. 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 side (H)     right hand contractures    Iron overload due to repeated red blood cell transfusions     Migraines     Multiple subsegmental pulmonary emboli without acute cor pulmonale (H) 02/01/2021    Oppositional defiant behavior     Presence of intrauterine contraceptive device 2/18/2020    Superficial venous thrombosis of arm, right 03/25/2021    Uncomplicated asthma        Past Surgical History:   Procedure Laterality Date    AS INSERT TUNNELED CV 2 CATH W/O PORT/PUMP      CHOLECYSTECTOMY      CV RIGHT HEART CATH MEASUREMENTS RECORDED N/A 11/18/2021    Procedure: Right Heart Cath;  Surgeon: Jackson Stauffer MD;  Location:  HEART CARDIAC CATH LAB    INSERT PORT VASCULAR ACCESS Left 4/21/2021    Procedure: INSERTION, VASCULAR ACCESS PORT (NOT SURE ON SIDE UNTIL REMOVAL);  Surgeon: Rajan More MD;  Location: [facility] OR    IR CHEST PORT PLACEMENT > 5 YRS OF AGE  4/21/2021    IR CVC NON TUNNEL LINE REMOVAL  5/6/2021    IR CVC NON TUNNEL PLACEMENT > 5 YRS  04/07/2020    IR CVC NON TUNNEL PLACEMENT > 5 YRS  4/30/2021    IR CVC NON TUNNEL PLACEMENT > 5 YRS  9/7/2022    IR PORT REMOVAL LEFT  4/21/2021    REMOVE PORT VASCULAR ACCESS Left 4/21/2021    Procedure: REMOVAL, VASCULAR ACCESS PORT LEFT;  Surgeon: Rajan More MD;  Location: [facility] OR    REPAIR TENDON ELBOW Right 10/02/2019    Procedure: Right Elbow Flexor Lengthening, Flexor Pronator Slide Of Wrist and Finger, Thumb Adductor Lengthening;  Surgeon: Anai Franco MD;  [facility]: [facility]    TONSILLECTOMY Bilateral 10/02/2019    Procedure: Bilateral Tonsillectomy;  Surgeon: Farhana Guy MD;  Location: [facility]    ZC BREAST REDUCTION (INCLUDES LIPO) TIER 3  Bilateral 04/23/2019       Family History   Problem Relation Age of Onset    Sickle Cell Trait Mother     Hypertension Mother     Asthma Mother     Sickle Cell Trait Father     Glaucoma No family hx of     Macular Degeneration No family hx of     Diabetes No family hx of     Gout No family hx of        Social History     Tobacco Use    Smoking status: Never     Passive exposure: Never    Smokeless tobacco: Never   Substance Use Topics    Alcohol use: Not Currently     Alcohol/week: 0.0 standard drinks of alcohol         Past Medical History  Past Medical History:   Diagnosis Date    Anxiety     Bleeding disorder (H)     Blood clotting disorder (H)     Cerebral infarction (H) 2015    Cognitive developmental delay     low IQ. 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 side (H)     right hand contractures    Iron overload due to repeated red blood cell transfusions     Migraines     Multiple subsegmental pulmonary emboli without acute cor pulmonale (H) 02/01/2021    Oppositional defiant behavior     Presence of intrauterine contraceptive device 2/18/2020    Superficial venous thrombosis of arm, right 03/25/2021    Uncomplicated asthma      Past Surgical History:   Procedure Laterality Date    AS INSERT TUNNELED CV 2 CATH W/O PORT/PUMP      CHOLECYSTECTOMY      CV RIGHT HEART CATH MEASUREMENTS RECORDED N/A 11/18/2021    Procedure: Right Heart Cath;  Surgeon: Jackson Stauffer MD;  Location:  HEART CARDIAC CATH LAB    INSERT PORT VASCULAR ACCESS Left 4/21/2021    Procedure: INSERTION, VASCULAR ACCESS PORT (NOT SURE ON SIDE UNTIL REMOVAL);  Surgeon: Rajan More MD;  Location: [facility] OR    IR CHEST PORT PLACEMENT > 5 YRS OF AGE  4/21/2021    IR CVC NON TUNNEL LINE REMOVAL  5/6/2021    IR CVC NON TUNNEL PLACEMENT > 5 YRS  04/07/2020    IR CVC NON TUNNEL PLACEMENT > 5 YRS  4/30/2021    IR CVC NON  TUNNEL PLACEMENT > 5 YRS  9/7/2022    IR PORT REMOVAL LEFT  4/21/2021    REMOVE PORT VASCULAR ACCESS Left 4/21/2021    Procedure: REMOVAL, VASCULAR ACCESS PORT LEFT;  Surgeon: Rajan More MD;  Location: [facility] OR    REPAIR TENDON ELBOW Right 10/02/2019    Procedure: Right Elbow Flexor Lengthening, Flexor Pronator Slide Of Wrist and Finger, Thumb Adductor Lengthening;  Surgeon: Anai Franco MD;  [facility]    TONSILLECTOMY Bilateral 10/02/2019    Procedure: Bilateral Tonsillectomy;  Surgeon: Farhana Guy MD;  [facility]    ZZC BREAST REDUCTION (INCLUDES LIPO) TIER 3 Bilateral 04/23/2019     albuterol (PROVENTIL) (2.5 MG/3ML) 0.083% neb solution  aspirin (ASA) 81 MG chewable tablet  budesonide-formoterol (SYMBICORT) 160-4.5 MCG/ACT Inhaler  deferasirox (JADENU) 360 MG tablet  Deferiprone, Twice Daily, 1000 MG TABS  diphenhydrAMINE (BENADRYL) 50 MG capsule  EPINEPHrine (ANY BX GENERIC EQUIV) 0.3 MG/0.3ML injection 2-pack  gabapentin (NEURONTIN) 300 MG capsule  hydroxyurea (HYDREA) 500 MG capsule  ibuprofen (ADVIL/MOTRIN) 800 MG tablet  melatonin 5 MG tablet  methocarbamol (ROBAXIN) 750 MG tablet  methylPREDNISolone (MEDROL) 32 MG tablet  naloxone (NARCAN) 4 MG/0.1ML nasal spray  ondansetron (ZOFRAN ODT) 8 MG ODT tab  oxyCODONE IR (ROXICODONE) 10 MG tablet  pantoprazole (PROTONIX) 40 MG EC tablet      Allergies   Allergen Reactions    Contrast Dye Angioedema     Hives and breathing issues    Fish-Derived Products Hives    Seafood Hives    Adhesive Tape Hives     Primipore dressing causes hives    Gadolinium     Iodinated Contrast Media      Family History  Family History   Problem Relation Age of Onset    Sickle Cell Trait Mother     Hypertension Mother     Asthma Mother     Sickle Cell Trait Father     Glaucoma No family hx of     Macular Degeneration No family hx of     Diabetes No family hx of     Gout No family hx of      Social History   Social History     Tobacco Use    Smoking  "status: Never     Passive exposure: Never    Smokeless tobacco: Never   Substance Use Topics    Alcohol use: Not Currently     Alcohol/week: 0.0 standard drinks of alcohol    Drug use: Never      A medically appropriate review of systems was performed with pertinent positives and negatives noted in the HPI, and all other systems negative.    Physical Exam   BP: (!) 155/80  Pulse: 89  Temp: 98.5  F (36.9  C)  Resp: 16  Height: 165.1 cm (5' 5\")  Weight: 72.1 kg (159 lb)  SpO2: 100 %  Physical Exam  Vitals and nursing note reviewed.   Constitutional:       General: She is not in acute distress.     Appearance: She is not diaphoretic.      Comments: Adult female, lying back in bed, alert, cooperative, no obvious distress.  Somewhat depressed affect.   HENT:      Head: Atraumatic.      Mouth/Throat:      Mouth: Mucous membranes are moist.      Pharynx: Oropharynx is clear. No oropharyngeal exudate.   Eyes:      General: No scleral icterus.     Pupils: Pupils are equal, round, and reactive to light.   Cardiovascular:      Rate and Rhythm: Normal rate.      Pulses: Normal pulses.      Heart sounds: Normal heart sounds. No murmur heard.  Pulmonary:      Effort: No respiratory distress.      Breath sounds: Normal breath sounds.   Abdominal:      General: Bowel sounds are normal.      Palpations: Abdomen is soft.      Tenderness: There is no abdominal tenderness.   Musculoskeletal:         General: No tenderness.   Skin:     General: Skin is warm.      Findings: No rash.   Neurological:      Mental Status: She is alert.      Comments: Right upper extremity spastic hemiparesis   Psychiatric:      Comments: Somewhat depressed mood, slightly flat affect         ED Course, Procedures, & Data      Procedures               Results for orders placed or performed during the hospital encounter of 11/09/24   Reticulocyte count     Status: Abnormal   Result Value Ref Range    % Reticulocyte 15.4 (H) 0.5 - 2.0 %    Absolute Reticulocyte " 0.552 (H) 0.025 - 0.095 10e6/uL   Comprehensive metabolic panel     Status: Abnormal   Result Value Ref Range    Sodium 139 135 - 145 mmol/L    Potassium 4.3 3.4 - 5.3 mmol/L    Carbon Dioxide (CO2) 24 22 - 29 mmol/L    Anion Gap 10 7 - 15 mmol/L    Urea Nitrogen 6.7 6.0 - 20.0 mg/dL    Creatinine 0.52 0.51 - 0.95 mg/dL    GFR Estimate >90 >60 mL/min/1.73m2    Calcium 8.9 8.8 - 10.4 mg/dL    Chloride 105 98 - 107 mmol/L    Glucose 88 70 - 99 mg/dL    Alkaline Phosphatase 62 40 - 150 U/L    AST 45 0 - 45 U/L    ALT 44 0 - 50 U/L    Protein Total 7.5 6.4 - 8.3 g/dL    Albumin 4.4 3.5 - 5.2 g/dL    Bilirubin Total 1.8 (H) <=1.2 mg/dL   CBC with platelets and differential     Status: Abnormal   Result Value Ref Range    WBC Count 11.0 4.0 - 11.0 10e3/uL    RBC Count 2.55 (L) 3.80 - 5.20 10e6/uL    Hemoglobin 7.4 (L) 11.7 - 15.7 g/dL    Hematocrit 22.0 (L) 35.0 - 47.0 %    MCV 86 78 - 100 fL    MCH 29.0 26.5 - 33.0 pg    MCHC 33.6 31.5 - 36.5 g/dL    RDW 21.8 (H) 10.0 - 15.0 %    Platelet Count 493 (H) 150 - 450 10e3/uL    % Neutrophils 71 %    % Lymphocytes 16 %    % Monocytes 7 %    % Eosinophils 4 %    % Basophils 1 %    % Immature Granulocytes 0 %    NRBCs per 100 WBC 1 (H) <1 /100    Absolute Neutrophils 7.8 1.6 - 8.3 10e3/uL    Absolute Lymphocytes 1.8 0.8 - 5.3 10e3/uL    Absolute Monocytes 0.8 0.0 - 1.3 10e3/uL    Absolute Eosinophils 0.5 0.0 - 0.7 10e3/uL    Absolute Basophils 0.2 0.0 - 0.2 10e3/uL    Absolute Immature Granulocytes 0.0 <=0.4 10e3/uL    Absolute NRBCs 0.1 10e3/uL   CBC with Platelets & Differential     Status: Abnormal    Narrative    The following orders were created for panel order CBC with Platelets & Differential.  Procedure                               Abnormality         Status                     ---------                               -----------         ------                     CBC with platelets and d...[[id number]]  Abnormal            Final result                 Please view results  for these tests on the individual orders.     Medications   anticoagulant citrate flush 5-10 mL ( Intracatheter Canceled Entry 11/9/24 2201)   heparin lock flush 10 unit/mL injection 5-10 mL ( Intracatheter Not Given 11/9/24 2217)   heparin lock flush 100 unit/mL injection 5-10 mL (5 mLs Intracatheter $Given 11/9/24 2221)   HYDROmorphone (DILAUDID) injection 1 mg (1 mg Intravenous $Given 11/9/24 2044)   ketorolac (TORADOL) injection 30 mg (30 mg Intravenous $Given 11/9/24 1743)   ondansetron (ZOFRAN) injection 8 mg (8 mg Intravenous $Given 11/9/24 1743)     Labs Ordered and Resulted from Time of ED Arrival to Time of ED Departure   RETICULOCYTE COUNT - Abnormal       Result Value    % Reticulocyte 15.4 (*)     Absolute Reticulocyte 0.552 (*)    COMPREHENSIVE METABOLIC PANEL - Abnormal    Sodium 139      Potassium 4.3      Carbon Dioxide (CO2) 24      Anion Gap 10      Urea Nitrogen 6.7      Creatinine 0.52      GFR Estimate >90      Calcium 8.9      Chloride 105      Glucose 88      Alkaline Phosphatase 62      AST 45      ALT 44      Protein Total 7.5      Albumin 4.4      Bilirubin Total 1.8 (*)    CBC WITH PLATELETS AND DIFFERENTIAL - Abnormal    WBC Count 11.0      RBC Count 2.55 (*)     Hemoglobin 7.4 (*)     Hematocrit 22.0 (*)     MCV 86      MCH 29.0      MCHC 33.6      RDW 21.8 (*)     Platelet Count 493 (*)     % Neutrophils 71      % Lymphocytes 16      % Monocytes 7      % Eosinophils 4      % Basophils 1      % Immature Granulocytes 0      NRBCs per 100 WBC 1 (*)     Absolute Neutrophils 7.8      Absolute Lymphocytes 1.8      Absolute Monocytes 0.8      Absolute Eosinophils 0.5      Absolute Basophils 0.2      Absolute Immature Granulocytes 0.0      Absolute NRBCs 0.1       No orders to display          Critical care was not performed.     Medical Decision Making  The patient's presentation was of high complexity (a chronic illness severe exacerbation, progression, or side effect of treatment).    The  patient's evaluation involved:  review of external note(s) from 3+ sources (see separate area of note for details)  review of 3+ test result(s) ordered prior to this encounter (see separate area of note for details)  ordering and/or review of 3+ test(s) in this encounter (see separate area of note for details)    The patient's management necessitated high risk (a parenteral controlled substance).    Assessment & Plan    Patient presents to the emergency department today for the above complaints.  Record was reviewed, she is following up with her hematology clinic and is doing what she has been advised to do by her clinic.  Nevertheless, she continues to have very frequent ED visits for pain crises.  She reports that this pain crisis today is new and started this morning, though she went to the infusion clinic yesterday and was seen in the emergency department 4 days ago for the same issue.  She was seen in the ED 2 days before that and again 2 days prior to that visit as well.  This does raise the question as to whether or not she might be experiencing more chronic pain issues, and/or side effects of attempting to decrease oral opiates.    Vital signs here in the emergency department are reassuring, she is afebrile and saturating 100% on room air.  She does not have any chest pain or shortness of breath so I do not have any suspicion for acute chest syndrome at this time.    We did follow her care plan here in the emergency department.  We did access her port, CBC shows normal white count of 11, hemoglobin is 7.4 which is consistent with her baseline.  Platelets are slightly elevated at 493, also consistent with previous.  Reticulocyte count is 15.4% so I have no concern about aplastic crisis at this time.  CMP shows normal electrolytes, normal LFTs with the exception of bilirubin of 1.8, likely slightly elevated secondary to acute sickling.  Based on her very specific care plan we did place a commode in her room.   She had her care plan medications ordered including Toradol 30 mg IV, Zofran 8 mg IV x 1 PRN, and Dilaudid 1 mg IV every hour x 3 doses.  Due to the IV fluid shortage we did not administer IV fluids today, patient does not vomiting and is able to take orals and so was given oral fluids instead.    After receiving her care plan patient feels better and would like to be discharged.  I will instruct her to follow-up with her primary started with her hematology clinic this week particularly given the frequency of her ED visits recently.  Patient verbalizes understanding.       I have reviewed the nursing notes. I have reviewed the findings, diagnosis, plan and need for follow up with the patient.    Discharge Medication List as of 11/9/2024  9:45 PM          Final diagnoses:   Sickle cell disease with crisis (H)       Leslie Roper MD  [facility] EMERGENCY DEPARTMENT  11/9/2024        Leslie Roper MD  11/10/24 0024

## 2024-11-10 NOTE — DISCHARGE INSTRUCTIONS
You have been seen in the emergency department today for your sickle cell disease.  Your hemoglobin is 7.4 and your labs are baseline for you.    Please follow-up with your hematology clinic this week for ongoing care.

## 2024-11-11 ENCOUNTER — PATIENT OUTREACH (OUTPATIENT)
Dept: ONCOLOGY | Facility: CLINIC | Age: 24
End: 2024-11-11

## 2024-11-11 ENCOUNTER — PATIENT OUTREACH (OUTPATIENT)
Dept: CARE COORDINATION | Facility: CLINIC | Age: 24
End: 2024-11-11
Payer: COMMERCIAL

## 2024-11-11 ENCOUNTER — INFUSION THERAPY VISIT (OUTPATIENT)
Dept: INFUSION THERAPY | Facility: CLINIC | Age: 24
End: 2024-11-11
Attending: PEDIATRICS
Payer: COMMERCIAL

## 2024-11-11 ENCOUNTER — NURSE TRIAGE (OUTPATIENT)
Dept: ONCOLOGY | Facility: CLINIC | Age: 24
End: 2024-11-11
Payer: COMMERCIAL

## 2024-11-11 VITALS — DIASTOLIC BLOOD PRESSURE: 74 MMHG | SYSTOLIC BLOOD PRESSURE: 117 MMHG | HEART RATE: 88 BPM

## 2024-11-11 DIAGNOSIS — D57.00 SICKLE CELL PAIN CRISIS (H): Primary | ICD-10-CM

## 2024-11-11 DIAGNOSIS — G81.10 SPASTIC HEMIPLEGIA, UNSPECIFIED ETIOLOGY, UNSPECIFIED LATERALITY (H): ICD-10-CM

## 2024-11-11 DIAGNOSIS — D57.00 SICKLE CELL PAIN CRISIS (H): ICD-10-CM

## 2024-11-11 PROCEDURE — 96375 TX/PRO/DX INJ NEW DRUG ADDON: CPT

## 2024-11-11 PROCEDURE — 258N000003 HC RX IP 258 OP 636: Performed by: PEDIATRICS

## 2024-11-11 PROCEDURE — 96361 HYDRATE IV INFUSION ADD-ON: CPT

## 2024-11-11 PROCEDURE — 96376 TX/PRO/DX INJ SAME DRUG ADON: CPT

## 2024-11-11 PROCEDURE — 250N000013 HC RX MED GY IP 250 OP 250 PS 637: Performed by: PEDIATRICS

## 2024-11-11 PROCEDURE — 250N000011 HC RX IP 250 OP 636: Performed by: PEDIATRICS

## 2024-11-11 PROCEDURE — 96374 THER/PROPH/DIAG INJ IV PUSH: CPT

## 2024-11-11 RX ORDER — OXYCODONE HYDROCHLORIDE 10 MG/1
10 TABLET ORAL EVERY 6 HOURS PRN
Qty: 10 TABLET | Refills: 0 | Status: SHIPPED | OUTPATIENT
Start: 2024-11-11

## 2024-11-11 RX ORDER — HEPARIN SODIUM (PORCINE) LOCK FLUSH IV SOLN 100 UNIT/ML 100 UNIT/ML
5 SOLUTION INTRAVENOUS
Status: DISCONTINUED | OUTPATIENT
Start: 2024-11-11 | End: 2024-11-11 | Stop reason: HOSPADM

## 2024-11-11 RX ORDER — DIPHENHYDRAMINE HCL 25 MG
50 CAPSULE ORAL
Status: COMPLETED | OUTPATIENT
Start: 2024-11-11 | End: 2024-11-11

## 2024-11-11 RX ORDER — KETOROLAC TROMETHAMINE 30 MG/ML
30 INJECTION, SOLUTION INTRAMUSCULAR; INTRAVENOUS ONCE
Status: CANCELLED
Start: 2025-01-01 | End: 2025-01-01

## 2024-11-11 RX ORDER — DIPHENHYDRAMINE HCL 25 MG
50 CAPSULE ORAL
Status: CANCELLED
Start: 2025-01-01

## 2024-11-11 RX ORDER — ONDANSETRON 2 MG/ML
8 INJECTION INTRAMUSCULAR; INTRAVENOUS EVERY 6 HOURS PRN
Status: CANCELLED
Start: 2025-01-01

## 2024-11-11 RX ORDER — ONDANSETRON 4 MG/1
8 TABLET, FILM COATED ORAL
Start: 2025-01-01

## 2024-11-11 RX ORDER — HEPARIN SODIUM (PORCINE) LOCK FLUSH IV SOLN 100 UNIT/ML 100 UNIT/ML
5 SOLUTION INTRAVENOUS
Status: CANCELLED | OUTPATIENT
Start: 2025-01-01

## 2024-11-11 RX ORDER — ONDANSETRON 2 MG/ML
8 INJECTION INTRAMUSCULAR; INTRAVENOUS EVERY 6 HOURS PRN
Status: DISCONTINUED | OUTPATIENT
Start: 2024-11-11 | End: 2024-11-11 | Stop reason: HOSPADM

## 2024-11-11 RX ORDER — HEPARIN SODIUM,PORCINE 10 UNIT/ML
5 VIAL (ML) INTRAVENOUS
OUTPATIENT
Start: 2025-01-01

## 2024-11-11 RX ORDER — KETOROLAC TROMETHAMINE 30 MG/ML
30 INJECTION, SOLUTION INTRAMUSCULAR; INTRAVENOUS ONCE
Status: COMPLETED | OUTPATIENT
Start: 2024-11-11 | End: 2024-11-11

## 2024-11-11 RX ADMIN — DIPHENHYDRAMINE HYDROCHLORIDE 50 MG: 25 CAPSULE ORAL at 08:44

## 2024-11-11 RX ADMIN — HYDROMORPHONE HYDROCHLORIDE 1 MG: 1 INJECTION, SOLUTION INTRAMUSCULAR; INTRAVENOUS; SUBCUTANEOUS at 08:41

## 2024-11-11 RX ADMIN — Medication 5 ML: at 10:47

## 2024-11-11 RX ADMIN — ONDANSETRON 8 MG: 2 INJECTION INTRAMUSCULAR; INTRAVENOUS at 08:44

## 2024-11-11 RX ADMIN — SODIUM CHLORIDE, POTASSIUM CHLORIDE, SODIUM LACTATE AND CALCIUM CHLORIDE 1000 ML: 600; 310; 30; 20 INJECTION, SOLUTION INTRAVENOUS at 08:33

## 2024-11-11 RX ADMIN — HYDROMORPHONE HYDROCHLORIDE 1 MG: 1 INJECTION, SOLUTION INTRAMUSCULAR; INTRAVENOUS; SUBCUTANEOUS at 09:35

## 2024-11-11 RX ADMIN — KETOROLAC TROMETHAMINE 30 MG: 30 INJECTION, SOLUTION INTRAMUSCULAR; INTRAVENOUS at 08:44

## 2024-11-11 RX ADMIN — HYDROMORPHONE HYDROCHLORIDE 1 MG: 1 INJECTION, SOLUTION INTRAMUSCULAR; INTRAVENOUS; SUBCUTANEOUS at 10:36

## 2024-11-11 NOTE — PROGRESS NOTES
"[facility]: Cancer Care                                                                                          RNCC spoke with pt.  She \" just wants to be done\" with oxycodone.  Pt has been working with Patricia JIMENEZ to reduce usage and for her weekly rx next week, she would like to get 2 weeks instead of of week and be done after that.  Writer spoke with Patricia TONY and she will consider that next week with the understanding that if she gets 2 weeks worth, she would not be able to get anymore for 2 weeks if she decides to continue getting Oxycodone.  RNCC will follow-up with pt.      Signature:  Arely Krueger RN  "

## 2024-11-11 NOTE — TELEPHONE ENCOUNTER
Jennifer calling to request a call back from Patricia regarding a medication. She says it is important. She has completed her infusion and is waiting for TJ to be done and then she will be leaving. Requested this writer reach out to Patricia with message.    1205: Secure chat sent to Patricia, marked important, asking her to give Jennifer a call per pt request.

## 2024-11-11 NOTE — PROGRESS NOTES
Infusion Nursing Note:  Jennifer Cervantes presents today for   Chief Complaint   Patient presents with    Infusion     Sickle cell pain crisis       Patient seen by provider today: No   present during visit today: No    Note:   -Orders from Eric fernandez MD completed. Frequency: as needed, via triage; patient's last infusion was 11/8/24.  -1L LR IV over 2hrs.  -50mg Benadryl PO x1.  -8mg Zofran IVP x1.  -30mg Toradol IVP x1.  -1mg Dilaudid IVP hourly x3.  -Pain at start of infusion 8/10; generalized. Pain at discharge 6/10.    Intravenous Access:  Lt chest port accessed by RN.    Treatment Conditions:  Not Applicable.    Post Infusion Assessment:  Patient tolerated infusion without incident.  Blood return noted pre and post infusion.  Site patent and intact, free from redness, edema or discomfort.  No evidence of extravasations.  Access discontinued per protocol.     Discharge Plan:   Discharge instructions reviewed with: Patient.  Patient and/or family verbalized understanding of discharge instructions and all questions answered.  AVS to patient via MYCHART.     Patient discharged in stable condition accompanied by: friend.  Departure Mode: Ambulatory.    Future Appointments   Date Time Provider Department Center   12/5/2024 10:00 AM  ONC INFUSION NURSE [facility] [place]   1/2/2025 10:00 AM  ONC INFUSION NURSE [facility] [place]   1/30/2025 10:00 AM  ONC INFUSION NURSE [facility]       Roz Kaur RN    /74 (BP Location: Left arm, Cuff Size: Adult Regular)   Pulse 88     Administrations This Visit       diphenhydrAMINE (BENADRYL) capsule 50 mg       Admin Date  11/11/2024 Action  $Given Dose  50 mg Route  Oral Documented By  Roz Kaur RN              heparin lock flush 100 unit/mL injection 5 mL       Admin Date  11/11/2024 Action  $Given Dose  5 mL Route  Intracatheter Documented By  Roz Kaur RN              HYDROmorphone (DILAUDID) injection 1 mg       Admin Date  11/11/2024  Action  $Given Dose  1 mg Route  Intravenous Documented By  Roz Kaur RN               Admin Date  11/11/2024 Action  $Given Dose  1 mg Route  Intravenous Documented By  Roz Kaur RN               Admin Date  11/11/2024 Action  $Given Dose  1 mg Route  Intravenous Documented By  Roz Kaur, JOE              ketorolac (TORADOL) injection 30 mg       Admin Date  11/11/2024 Action  $Given Dose  30 mg Route  Intravenous Documented By  Roz Kaur, JOE              lactated ringers BOLUS 1,000 mL       Admin Date  11/11/2024 Action  $New Bag Dose  1,000 mL Rate  500 mL/hr Route  Intravenous Documented By  Roz Kaur, JOE              ondansetron (ZOFRAN) injection 8 mg       Admin Date  11/11/2024 Action  $Given Dose  8 mg Route  Intravenous Documented By  Roz Kaur, JOE

## 2024-11-11 NOTE — TELEPHONE ENCOUNTER
Oncology Nurse Triage - Sickle Cell Pain Crisis:    Situation: Jennifer  calling about Sickle Cell Pain Crisis, requesting to be added on for IV fluids and pain medicine    Background:     Patient's last infusion was ED on Saturday 11/9/2024  Last clinic visit date:10/31/2024 Patricia Mantilla CNP  Does patient have active treatment plan?  Yes      Assessment of Symptoms:  Onset/Duration of symptoms: 2 day    Is it typical sickle cell pain? Yes   Location: general all over  Character: Sharp           Intensity: 8/10    Any radiation of pain, numbness, tingling, weakness, warmth, swelling, discoloration of arms or legs?No     Fever?No     Chest Pain Present: No     Shortness of breath: No     Other home therapies tried: HEAT/HEATING PAD and WARM BATH     Last home medication taken and when: yesterday around night    Any Refills Needed?: Yes , oxycodone 10mg every 6hours    Does patient have transportation & length of time to get to clinic: Yes         Recommendations:   0730 Per Patricia Mantilla okay for IVF/Pain per therapy plan.     0734 offer for 8:00am with [facility], pt in agreement with plan, had a ride to clinic, needs a ride home.     0740 scheduling request sent.     Please note, if you are late for your appt, you risk losing your infusion appt as it may delay another patient's infusion who arrived on time.

## 2024-11-11 NOTE — TELEPHONE ENCOUNTER
Narcotic Refill Request    Medication(s) requested:  oxycodone  Person Requesting Refill:Jennifer (pt)   What pain is the medication treating: sickle cell pain  How is the medication being taken?:1 tablet every 6hours with goal of six  Does pt have enough for today? no  Is pain being adequately controlled on the current regimen?: requires intermittent IVF/Pain about 3xweek  Experiencing any side effects from medication?: denies    Date of most recent appointment:  10/31/2024 Patricia Mantilla CNP  Any No Show Visits:none recently  Next appointment:   11/25/2024 Patricia Mantilla CNP  Last fill date and by whom:  Patricia Mantilla on 11/4/2024.    Reviewed: not an agent    Send to provider: Patricia Mantilla and RNCC roxanne

## 2024-11-11 NOTE — PROGRESS NOTES
Social Work - Transportation  [facility]    Data/Intervention:  Patient Name: Jennifer Cervantes Goes By: Jennifer    /Age: 2000 (24 year old)    Referral From: Ange Triage RN's  Reason for Referral:  support requested for patient transportation needs for today's appointment.  Assessment:  called [employer] to arrange ride home. [employer] arranged ride with [employer] for 11:30 am  time.  Plan: SW called patient and left detailed message with ride information.  available to assist with any other needs.    CARLOS Chavez,LGSW  Hematology/Oncology Social Worker  Phone:[phone] Pager: [phone]

## 2024-11-12 ENCOUNTER — HOSPITAL ENCOUNTER (EMERGENCY)
Facility: CLINIC | Age: 24
Discharge: HOME | End: 2024-11-12
Attending: EMERGENCY MEDICINE | Admitting: EMERGENCY MEDICINE
Payer: COMMERCIAL

## 2024-11-12 VITALS
DIASTOLIC BLOOD PRESSURE: 74 MMHG | SYSTOLIC BLOOD PRESSURE: 122 MMHG | TEMPERATURE: 98.2 F | HEART RATE: 96 BPM | RESPIRATION RATE: 16 BRPM | OXYGEN SATURATION: 94 %

## 2024-11-12 DIAGNOSIS — D57.00 SICKLE CELL PAIN CRISIS (H): ICD-10-CM

## 2024-11-12 LAB
ALBUMIN SERPL BCG-MCNC: 3.7 G/DL (ref 3.5–5.2)
ALBUMIN UR-MCNC: NEGATIVE MG/DL
ALP SERPL-CCNC: 55 U/L (ref 40–150)
ALT SERPL W P-5'-P-CCNC: 29 U/L (ref 0–50)
ANION GAP SERPL CALCULATED.3IONS-SCNC: 8 MMOL/L (ref 7–15)
APPEARANCE UR: CLEAR
AST SERPL W P-5'-P-CCNC: 37 U/L (ref 0–45)
BASOPHILS # BLD AUTO: 0.2 10E3/UL (ref 0–0.2)
BASOPHILS NFR BLD AUTO: 2 %
BILIRUB DIRECT SERPL-MCNC: 0.27 MG/DL (ref 0–0.3)
BILIRUB SERPL-MCNC: 1.6 MG/DL
BILIRUB UR QL STRIP: NEGATIVE
BUN SERPL-MCNC: 8.4 MG/DL (ref 6–20)
CALCIUM SERPL-MCNC: 7.5 MG/DL (ref 8.8–10.4)
CHLORIDE SERPL-SCNC: 112 MMOL/L (ref 98–107)
COLOR UR AUTO: YELLOW
CREAT SERPL-MCNC: 0.51 MG/DL (ref 0.51–0.95)
EGFRCR SERPLBLD CKD-EPI 2021: >90 ML/MIN/1.73M2
EOSINOPHIL # BLD AUTO: 0.6 10E3/UL (ref 0–0.7)
EOSINOPHIL NFR BLD AUTO: 5 %
ERYTHROCYTE [DISTWIDTH] IN BLOOD BY AUTOMATED COUNT: 22.5 % (ref 10–15)
GLUCOSE SERPL-MCNC: 81 MG/DL (ref 70–99)
GLUCOSE UR STRIP-MCNC: NEGATIVE MG/DL
HCG UR QL: NEGATIVE
HCO3 SERPL-SCNC: 21 MMOL/L (ref 22–29)
HCT VFR BLD AUTO: 21.3 % (ref 35–47)
HGB BLD-MCNC: 7.3 G/DL (ref 11.7–15.7)
HGB UR QL STRIP: NEGATIVE
IMM GRANULOCYTES # BLD: 0.1 10E3/UL
IMM GRANULOCYTES NFR BLD: 0 %
KETONES UR STRIP-MCNC: NEGATIVE MG/DL
LEUKOCYTE ESTERASE UR QL STRIP: NEGATIVE
LYMPHOCYTES # BLD AUTO: 1.7 10E3/UL (ref 0.8–5.3)
LYMPHOCYTES NFR BLD AUTO: 14 %
MCH RBC QN AUTO: 29.2 PG (ref 26.5–33)
MCHC RBC AUTO-ENTMCNC: 34.3 G/DL (ref 31.5–36.5)
MCV RBC AUTO: 85 FL (ref 78–100)
MONOCYTES # BLD AUTO: 0.9 10E3/UL (ref 0–1.3)
MONOCYTES NFR BLD AUTO: 7 %
NEUTROPHILS # BLD AUTO: 8.4 10E3/UL (ref 1.6–8.3)
NEUTROPHILS NFR BLD AUTO: 71 %
NITRATE UR QL: NEGATIVE
NRBC # BLD AUTO: 0.2 10E3/UL
NRBC BLD AUTO-RTO: 2 /100
PH UR STRIP: 7.5 [PH] (ref 5–7)
PLATELET # BLD AUTO: 417 10E3/UL (ref 150–450)
POTASSIUM SERPL-SCNC: 3.5 MMOL/L (ref 3.4–5.3)
PROT SERPL-MCNC: 6.4 G/DL (ref 6.4–8.3)
RBC # BLD AUTO: 2.5 10E6/UL (ref 3.8–5.2)
RBC URINE: <1 /HPF
RETICS # AUTO: 0.43 10E6/UL (ref 0.03–0.1)
RETICS/RBC NFR AUTO: 17.3 % (ref 0.5–2)
SODIUM SERPL-SCNC: 141 MMOL/L (ref 135–145)
SP GR UR STRIP: 1.01 (ref 1–1.03)
SQUAMOUS EPITHELIAL: <1 /HPF
UROBILINOGEN UR STRIP-MCNC: 3 MG/DL
WBC # BLD AUTO: 11.7 10E3/UL (ref 4–11)
WBC URINE: 2 /HPF

## 2024-11-12 PROCEDURE — 87086 URINE CULTURE/COLONY COUNT: CPT | Performed by: EMERGENCY MEDICINE

## 2024-11-12 PROCEDURE — 80048 BASIC METABOLIC PNL TOTAL CA: CPT | Performed by: EMERGENCY MEDICINE

## 2024-11-12 PROCEDURE — 96375 TX/PRO/DX INJ NEW DRUG ADDON: CPT | Performed by: EMERGENCY MEDICINE

## 2024-11-12 PROCEDURE — 81025 URINE PREGNANCY TEST: CPT | Performed by: EMERGENCY MEDICINE

## 2024-11-12 PROCEDURE — 99284 EMERGENCY DEPT VISIT MOD MDM: CPT | Mod: 25 | Performed by: EMERGENCY MEDICINE

## 2024-11-12 PROCEDURE — 85025 COMPLETE CBC W/AUTO DIFF WBC: CPT | Performed by: EMERGENCY MEDICINE

## 2024-11-12 PROCEDURE — 99285 EMERGENCY DEPT VISIT HI MDM: CPT | Performed by: EMERGENCY MEDICINE

## 2024-11-12 PROCEDURE — 96376 TX/PRO/DX INJ SAME DRUG ADON: CPT | Performed by: EMERGENCY MEDICINE

## 2024-11-12 PROCEDURE — 250N000011 HC RX IP 250 OP 636: Performed by: EMERGENCY MEDICINE

## 2024-11-12 PROCEDURE — 36415 COLL VENOUS BLD VENIPUNCTURE: CPT | Performed by: EMERGENCY MEDICINE

## 2024-11-12 PROCEDURE — 85045 AUTOMATED RETICULOCYTE COUNT: CPT | Performed by: EMERGENCY MEDICINE

## 2024-11-12 PROCEDURE — 96374 THER/PROPH/DIAG INJ IV PUSH: CPT | Performed by: EMERGENCY MEDICINE

## 2024-11-12 PROCEDURE — 82248 BILIRUBIN DIRECT: CPT | Performed by: EMERGENCY MEDICINE

## 2024-11-12 PROCEDURE — 81003 URINALYSIS AUTO W/O SCOPE: CPT | Performed by: EMERGENCY MEDICINE

## 2024-11-12 RX ORDER — KETOROLAC TROMETHAMINE 30 MG/ML
30 INJECTION, SOLUTION INTRAMUSCULAR; INTRAVENOUS ONCE
Status: COMPLETED | OUTPATIENT
Start: 2024-11-12 | End: 2024-11-12

## 2024-11-12 RX ORDER — HEPARIN SODIUM (PORCINE) LOCK FLUSH IV SOLN 100 UNIT/ML 100 UNIT/ML
5 SOLUTION INTRAVENOUS ONCE
Status: COMPLETED | OUTPATIENT
Start: 2024-11-12 | End: 2024-11-12

## 2024-11-12 RX ADMIN — KETOROLAC TROMETHAMINE 30 MG: 30 INJECTION, SOLUTION INTRAMUSCULAR at 14:33

## 2024-11-12 RX ADMIN — HEPARIN 5 ML: 100 SYRINGE at 17:47

## 2024-11-12 RX ADMIN — HYDROMORPHONE HYDROCHLORIDE 1 MG: 1 INJECTION, SOLUTION INTRAMUSCULAR; INTRAVENOUS; SUBCUTANEOUS at 14:33

## 2024-11-12 RX ADMIN — HYDROMORPHONE HYDROCHLORIDE 1 MG: 1 INJECTION, SOLUTION INTRAMUSCULAR; INTRAVENOUS; SUBCUTANEOUS at 15:25

## 2024-11-12 RX ADMIN — HYDROMORPHONE HYDROCHLORIDE 1 MG: 1 INJECTION, SOLUTION INTRAMUSCULAR; INTRAVENOUS; SUBCUTANEOUS at 16:27

## 2024-11-12 ASSESSMENT — ACTIVITIES OF DAILY LIVING (ADL)
ADLS_ACUITY_SCORE: 0

## 2024-11-12 NOTE — ED PROVIDER NOTES
Patient with ED Provider Note  [facility]      History     Chief Complaint   Patient presents with    Sickle Cell Pain Crisis     LUE and LLE pain. Starting today. Pt also reporting sharp abdominal pain that has been going on for months.      HPI  Jennifer Cervantes is a 24 year old female with a history of sickle cell disease c/b frequent pain crises, history of stroke leading to cognitive delays who presents to the ED for evaluation of sickle cell pain.  Patient reports pain in her left shoulder and left arm as well as her left leg that began today.  Patient has been dealing with ongoing abdominal pain, which she states has persisted through today.  Patient reports this feels similar to her previous sickle cell pain crises.  Patient denies recent fevers, chest pain, shortness of breath, diarrhea or vomiting.  She denies dysuria, hematuria or frequency.  Patient states she reports to the ER today because she does not wish to take her oxycodone at home.    She notes that her abdominal pain is unchanged from what she has been experiencing for the last several months.    Past Medical History  Past Medical History:   Diagnosis Date    Anxiety     Bleeding disorder (H)     Blood clotting disorder (H)     Cerebral infarction (H) 2015    Cognitive developmental delay     low IQ. 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 side (H)     right hand contractures    Iron overload due to repeated red blood cell transfusions     Migraines     Multiple subsegmental pulmonary emboli without acute cor pulmonale (H) 02/01/2021    Oppositional defiant behavior     Presence of intrauterine contraceptive device 2/18/2020    Superficial venous thrombosis of arm, right 03/25/2021    Uncomplicated asthma      Past Surgical History:   Procedure Laterality Date    AS INSERT TUNNELED CV 2 CATH W/O  PORT/PUMP      CHOLECYSTECTOMY      CV RIGHT HEART CATH MEASUREMENTS RECORDED N/A 11/18/2021    Procedure: Right Heart Cath;  Surgeon: Jackson Stauffer MD;  Location:  HEART CARDIAC CATH LAB    INSERT PORT VASCULAR ACCESS Left 4/21/2021    Procedure: INSERTION, VASCULAR ACCESS PORT (NOT SURE ON SIDE UNTIL REMOVAL);  Surgeon: Rajan More MD;  Location: [facility] OR    IR CHEST PORT PLACEMENT > 5 YRS OF AGE  4/21/2021    IR CVC NON TUNNEL LINE REMOVAL  5/6/2021    IR CVC NON TUNNEL PLACEMENT > 5 YRS  04/07/2020    IR CVC NON TUNNEL PLACEMENT > 5 YRS  4/30/2021    IR CVC NON TUNNEL PLACEMENT > 5 YRS  9/7/2022    IR PORT REMOVAL LEFT  4/21/2021    REMOVE PORT VASCULAR ACCESS Left 4/21/2021    Procedure: REMOVAL, VASCULAR ACCESS PORT LEFT;  Surgeon: Rajan More MD;  Location: [facility] OR    REPAIR TENDON ELBOW Right 10/02/2019    Procedure: Right Elbow Flexor Lengthening, Flexor Pronator Slide Of Wrist and Finger, Thumb Adductor Lengthening;  Surgeon: Anai Franco MD;  Location: [facility] OR    TONSILLECTOMY Bilateral 10/02/2019    Procedure: Bilateral Tonsillectomy;  Surgeon: Farhana Guy MD;  Location: [facility] OR    ZZC BREAST REDUCTION (INCLUDES LIPO) TIER 3 Bilateral 04/23/2019     albuterol (PROVENTIL) (2.5 MG/3ML) 0.083% neb solution  aspirin (ASA) 81 MG chewable tablet  budesonide-formoterol (SYMBICORT) 160-4.5 MCG/ACT Inhaler  deferasirox (JADENU) 360 MG tablet  Deferiprone, Twice Daily, 1000 MG TABS  diphenhydrAMINE (BENADRYL) 50 MG capsule  EPINEPHrine (ANY BX GENERIC EQUIV) 0.3 MG/0.3ML injection 2-pack  gabapentin (NEURONTIN) 300 MG capsule  hydroxyurea (HYDREA) 500 MG capsule  ibuprofen (ADVIL/MOTRIN) 800 MG tablet  melatonin 5 MG tablet  methocarbamol (ROBAXIN) 750 MG tablet  methylPREDNISolone (MEDROL) 32 MG tablet  naloxone (NARCAN) 4 MG/0.1ML nasal spray  ondansetron (ZOFRAN ODT) 8 MG ODT tab  oxyCODONE IR (ROXICODONE) 10 MG tablet  pantoprazole (PROTONIX) 40 MG EC tablet      Allergies    Allergen Reactions    Contrast Dye Angioedema     Hives and breathing issues    Fish-Derived Products Hives    Seafood Hives    Adhesive Tape Hives     Primipore dressing causes hives    Gadolinium     Iodinated Contrast Media      Family History  Family History   Problem Relation Age of Onset    Sickle Cell Trait Mother     Hypertension Mother     Asthma Mother     Sickle Cell Trait Father     Glaucoma No family hx of     Macular Degeneration No family hx of     Diabetes No family hx of     Gout No family hx of      Social History   Social History     Tobacco Use    Smoking status: Never     Passive exposure: Never    Smokeless tobacco: Never   Substance Use Topics    Alcohol use: Not Currently     Alcohol/week: 0.0 standard drinks of alcohol    Drug use: Never      Past medical history, past surgical history, medications, allergies, family history, and social history were reviewed with the patient. No additional pertinent items.   A medically appropriate review of systems was performed with pertinent positives and negatives noted in the HPI, and all other systems negative.    Physical Exam   BP: 118/76  Pulse: 107  Temp: 98  F (36.7  C)  Resp: 16  SpO2: 95 %  Physical Exam    General: awake, alert, NAD  Head: normal cephalic  HEENT: pupils equal, conjugate gaze intact  Neck: Supple  CV: regular rate and rhythm without murmur  Lungs: clear to auscultation  Abd: soft, non-tender, no guarding, no peritoneal signs  EXT: Patient's left upper extremity mild redness, warmth, swelling or erythema.  Neuro: awake, answers questions appropriately.  Patient with decreased tone and strength in her right upper extremity consistent with known previous CVA.    ED Course, Procedures, & Data      Procedures            Results for orders placed or performed during the hospital encounter of 11/12/24   Basic metabolic panel     Status: Abnormal   Result Value Ref Range    Sodium 141 135 - 145 mmol/L    Potassium 3.5 3.4 - 5.3 mmol/L     Chloride 112 (H) 98 - 107 mmol/L    Carbon Dioxide (CO2) 21 (L) 22 - 29 mmol/L    Anion Gap 8 7 - 15 mmol/L    Urea Nitrogen 8.4 6.0 - 20.0 mg/dL    Creatinine 0.51 0.51 - 0.95 mg/dL    GFR Estimate >90 >60 mL/min/1.73m2    Calcium 7.5 (L) 8.8 - 10.4 mg/dL    Glucose 81 70 - 99 mg/dL   Reticulocyte count     Status: Abnormal   Result Value Ref Range    % Reticulocyte 17.3 (H) 0.5 - 2.0 %    Absolute Reticulocyte 0.432 (H) 0.025 - 0.095 10e6/uL   CBC with platelets and differential     Status: Abnormal   Result Value Ref Range    WBC Count 11.7 (H) 4.0 - 11.0 10e3/uL    RBC Count 2.50 (L) 3.80 - 5.20 10e6/uL    Hemoglobin 7.3 (L) 11.7 - 15.7 g/dL    Hematocrit 21.3 (L) 35.0 - 47.0 %    MCV 85 78 - 100 fL    MCH 29.2 26.5 - 33.0 pg    MCHC 34.3 31.5 - 36.5 g/dL    RDW 22.5 (H) 10.0 - 15.0 %    Platelet Count 417 150 - 450 10e3/uL    % Neutrophils 71 %    % Lymphocytes 14 %    % Monocytes 7 %    % Eosinophils 5 %    % Basophils 2 %    % Immature Granulocytes 0 %    NRBCs per 100 WBC 2 (H) <1 /100    Absolute Neutrophils 8.4 (H) 1.6 - 8.3 10e3/uL    Absolute Lymphocytes 1.7 0.8 - 5.3 10e3/uL    Absolute Monocytes 0.9 0.0 - 1.3 10e3/uL    Absolute Eosinophils 0.6 0.0 - 0.7 10e3/uL    Absolute Basophils 0.2 0.0 - 0.2 10e3/uL    Absolute Immature Granulocytes 0.1 <=0.4 10e3/uL    Absolute NRBCs 0.2 10e3/uL   UA with Microscopic reflex to Culture     Status: Abnormal    Specimen: Urine, Midstream   Result Value Ref Range    Color Urine Yellow Colorless, Straw, Light Yellow, Yellow    Appearance Urine Clear Clear    Glucose Urine Negative Negative mg/dL    Bilirubin Urine Negative Negative    Ketones Urine Negative Negative mg/dL    Specific Gravity Urine 1.011 1.003 - 1.035    Blood Urine Negative Negative    pH Urine 7.5 (H) 5.0 - 7.0    Protein Albumin Urine Negative Negative mg/dL    Urobilinogen Urine 3.0 (A) Normal, 2.0 mg/dL    Nitrite Urine Negative Negative    Leukocyte Esterase Urine Negative Negative    RBC  Urine <1 <=2 /HPF    WBC Urine 2 <=5 /HPF    Squamous Epithelials Urine <1 <=1 /HPF    Narrative    Urine Culture not indicated   HCG qualitative urine     Status: Normal   Result Value Ref Range    hCG Urine Qualitative Negative Negative   Hepatic panel     Status: Abnormal   Result Value Ref Range    Protein Total 6.4 6.4 - 8.3 g/dL    Albumin 3.7 3.5 - 5.2 g/dL    Bilirubin Total 1.6 (H) <=1.2 mg/dL    Alkaline Phosphatase 55 40 - 150 U/L    AST 37 0 - 45 U/L    ALT 29 0 - 50 U/L    Bilirubin Direct 0.27 0.00 - 0.30 mg/dL   CBC with platelets differential     Status: Abnormal    Narrative    The following orders were created for panel order CBC with platelets differential.  Procedure                               Abnormality         Status                     ---------                               -----------         ------                     CBC with platelets and d...[[id number]]  Abnormal            Final result                 Please view results for these tests on the individual orders.     Medications   HYDROmorphone (DILAUDID) injection 1 mg (1 mg Intravenous $Given 11/12/24 1627)   ketorolac (TORADOL) injection 30 mg (30 mg Intravenous $Given 11/12/24 1433)     Labs Ordered and Resulted from Time of ED Arrival to Time of ED Departure   BASIC METABOLIC PANEL - Abnormal       Result Value    Sodium 141      Potassium 3.5      Chloride 112 (*)     Carbon Dioxide (CO2) 21 (*)     Anion Gap 8      Urea Nitrogen 8.4      Creatinine 0.51      GFR Estimate >90      Calcium 7.5 (*)     Glucose 81     RETICULOCYTE COUNT - Abnormal    % Reticulocyte 17.3 (*)     Absolute Reticulocyte 0.432 (*)    CBC WITH PLATELETS AND DIFFERENTIAL - Abnormal    WBC Count 11.7 (*)     RBC Count 2.50 (*)     Hemoglobin 7.3 (*)     Hematocrit 21.3 (*)     MCV 85      MCH 29.2      MCHC 34.3      RDW 22.5 (*)     Platelet Count 417      % Neutrophils 71      % Lymphocytes 14      % Monocytes 7      % Eosinophils 5      % Basophils 2       % Immature Granulocytes 0      NRBCs per 100 WBC 2 (*)     Absolute Neutrophils 8.4 (*)     Absolute Lymphocytes 1.7      Absolute Monocytes 0.9      Absolute Eosinophils 0.6      Absolute Basophils 0.2      Absolute Immature Granulocytes 0.1      Absolute NRBCs 0.2     ROUTINE UA WITH MICROSCOPIC REFLEX TO CULTURE - Abnormal    Color Urine Yellow      Appearance Urine Clear      Glucose Urine Negative      Bilirubin Urine Negative      Ketones Urine Negative      Specific Gravity Urine 1.011      Blood Urine Negative      pH Urine 7.5 (*)     Protein Albumin Urine Negative      Urobilinogen Urine 3.0 (*)     Nitrite Urine Negative      Leukocyte Esterase Urine Negative      RBC Urine <1      WBC Urine 2      Squamous Epithelials Urine <1     HEPATIC FUNCTION PANEL - Abnormal    Protein Total 6.4      Albumin 3.7      Bilirubin Total 1.6 (*)     Alkaline Phosphatase 55      AST 37      ALT 29      Bilirubin Direct 0.27     HCG QUALITATIVE URINE - Normal    hCG Urine Qualitative Negative     URINE CULTURE     No orders to display          Critical care was not performed.     Medical Decision Making  The patient's presentation was of high complexity (a chronic illness severe exacerbation, progression, or side effect of treatment).    The patient's evaluation involved:  review of external note(s) from 2 sources (see separate area of note for details)  review of 3+ test result(s) ordered prior to this encounter (see separate area of note for details)  ordering and/or review of 3+ test(s) in this encounter (see separate area of note for details)    The patient's management necessitated high risk (a parenteral controlled substance).    Assessment & Plan    Jennifer 24-year-old female presents to the emergency department with sickle cell pain crisis in her left arm left leg and also complaining of abdominal pain.  On exam she is well-appearing, nontoxic, mildly tachycardic but otherwise normal vital signs.  She is  endorsing ongoing chronic abdominal pain for the last 2 months with a negative workup that is unchanged, she has a benign abdominal exam.  Heart lung exam notable for only tachycardia otherwise unremarkable.      No symptoms concerning for acute chest syndrome today.    Patient reports pain is consistent with her typical sickle cell pain.  Extremity is normal-appearing without erythema swelling or tenderness to suggest infection or alternative cause of pain.  Per chart review she had several reports of this similar pain, she has had negative CT imaging and has outpatient MRI ordered.  If labs are unremarkable do not feel that she needs repeat imaging given her benign abdominal exam and unchanged symptoms from baseline.    Patient has a baseline hemoglobin, white count near baseline.  An appropriately elevated reticulocyte count so no suspicion for aplastic crisis.    Patient got pain control per her care plan.    Patient's labs are reassuring.  LFTs are normal urine and pregnancy test are negative.  Given benign abdomen recent CT imaging I do not think she needs further CT imaging at this time.  On reassessment she was more comfortable appearing pain was controlled she was discharged.      I have reviewed the nursing notes. I have reviewed the findings, diagnosis, plan and need for follow up with the patient.    New Prescriptions    No medications on file       Final diagnoses:   Sickle cell pain crisis (H)     IJered, am serving as a trained medical scribe to document services personally performed by Kilo Dai MD, based on the provider's statements to me.      I, Kilo Dai MD, was physically present and have reviewed and verified the accuracy of this note documented by Jered Carr.     Kilo Dai MD  [facility] EMERGENCY DEPARTMENT  11/12/2024     Kilo Dai MD  11/12/24 [id number]

## 2024-11-12 NOTE — ED TRIAGE NOTES
Triage Assessment (Adult)       Row Name 11/12/24 6432          Triage Assessment    Airway WDL WDL        Respiratory WDL    Respiratory WDL WDL        Skin Circulation/Temperature WDL    Skin Circulation/Temperature WDL WDL        Cardiac WDL    Cardiac WDL WDL        Peripheral/Neurovascular WDL    Peripheral Neurovascular WDL WDL        Cognitive/Neuro/Behavioral WDL    Cognitive/Neuro/Behavioral WDL WDL

## 2024-11-13 ENCOUNTER — APPOINTMENT (OUTPATIENT)
Dept: GENERAL RADIOLOGY | Facility: CLINIC | Age: 24
End: 2024-11-13
Attending: FAMILY MEDICINE
Payer: COMMERCIAL

## 2024-11-13 ENCOUNTER — HOSPITAL ENCOUNTER (EMERGENCY)
Facility: CLINIC | Age: 24
Discharge: HOME | End: 2024-11-13
Attending: FAMILY MEDICINE | Admitting: FAMILY MEDICINE
Payer: COMMERCIAL

## 2024-11-13 ENCOUNTER — NURSE TRIAGE (OUTPATIENT)
Dept: ONCOLOGY | Facility: CLINIC | Age: 24
End: 2024-11-13
Payer: COMMERCIAL

## 2024-11-13 VITALS
WEIGHT: 160 LBS | OXYGEN SATURATION: 92 % | HEART RATE: 100 BPM | TEMPERATURE: 99.2 F | SYSTOLIC BLOOD PRESSURE: 124 MMHG | HEIGHT: 64 IN | RESPIRATION RATE: 18 BRPM | BODY MASS INDEX: 27.31 KG/M2 | DIASTOLIC BLOOD PRESSURE: 87 MMHG

## 2024-11-13 DIAGNOSIS — D57.00 SICKLE CELL PAIN CRISIS (H): ICD-10-CM

## 2024-11-13 LAB
ALBUMIN SERPL BCG-MCNC: 4.5 G/DL (ref 3.5–5.2)
ALP SERPL-CCNC: 65 U/L (ref 40–150)
ALT SERPL W P-5'-P-CCNC: 31 U/L (ref 0–50)
ANION GAP SERPL CALCULATED.3IONS-SCNC: 13 MMOL/L (ref 7–15)
AST SERPL W P-5'-P-CCNC: 38 U/L (ref 0–45)
BASOPHILS # BLD AUTO: 0.3 10E3/UL (ref 0–0.2)
BASOPHILS NFR BLD AUTO: 2 %
BILIRUB SERPL-MCNC: 2.1 MG/DL
BUN SERPL-MCNC: 11 MG/DL (ref 6–20)
CALCIUM SERPL-MCNC: 9.2 MG/DL (ref 8.8–10.4)
CHLORIDE SERPL-SCNC: 106 MMOL/L (ref 98–107)
CREAT SERPL-MCNC: 0.53 MG/DL (ref 0.51–0.95)
EGFRCR SERPLBLD CKD-EPI 2021: >90 ML/MIN/1.73M2
EOSINOPHIL # BLD AUTO: 0.5 10E3/UL (ref 0–0.7)
EOSINOPHIL NFR BLD AUTO: 4 %
ERYTHROCYTE [DISTWIDTH] IN BLOOD BY AUTOMATED COUNT: 23.8 % (ref 10–15)
FLUAV RNA SPEC QL NAA+PROBE: NEGATIVE
FLUBV RNA RESP QL NAA+PROBE: NEGATIVE
GLUCOSE SERPL-MCNC: 92 MG/DL (ref 70–99)
HCO3 SERPL-SCNC: 21 MMOL/L (ref 22–29)
HCT VFR BLD AUTO: 22.6 % (ref 35–47)
HGB BLD-MCNC: 7.8 G/DL (ref 11.7–15.7)
IMM GRANULOCYTES # BLD: 0.1 10E3/UL
IMM GRANULOCYTES NFR BLD: 1 %
LYMPHOCYTES # BLD AUTO: 2.1 10E3/UL (ref 0.8–5.3)
LYMPHOCYTES NFR BLD AUTO: 15 %
MCH RBC QN AUTO: 29.8 PG (ref 26.5–33)
MCHC RBC AUTO-ENTMCNC: 34.5 G/DL (ref 31.5–36.5)
MCV RBC AUTO: 86 FL (ref 78–100)
MONOCYTES # BLD AUTO: 0.9 10E3/UL (ref 0–1.3)
MONOCYTES NFR BLD AUTO: 7 %
NEUTROPHILS # BLD AUTO: 10.5 10E3/UL (ref 1.6–8.3)
NEUTROPHILS NFR BLD AUTO: 73 %
NRBC # BLD AUTO: 0.3 10E3/UL
NRBC BLD AUTO-RTO: 2 /100
PLATELET # BLD AUTO: 452 10E3/UL (ref 150–450)
POTASSIUM SERPL-SCNC: 4.1 MMOL/L (ref 3.4–5.3)
PROT SERPL-MCNC: 7.8 G/DL (ref 6.4–8.3)
RBC # BLD AUTO: 2.62 10E6/UL (ref 3.8–5.2)
RETICS # AUTO: 0.48 10E6/UL (ref 0.03–0.1)
RETICS/RBC NFR AUTO: 19.7 % (ref 0.5–2)
RSV RNA SPEC NAA+PROBE: NEGATIVE
SARS-COV-2 RNA RESP QL NAA+PROBE: NEGATIVE
SODIUM SERPL-SCNC: 140 MMOL/L (ref 135–145)
TROPONIN T SERPL HS-MCNC: <6 NG/L
WBC # BLD AUTO: 14.3 10E3/UL (ref 4–11)

## 2024-11-13 PROCEDURE — 96361 HYDRATE IV INFUSION ADD-ON: CPT | Performed by: FAMILY MEDICINE

## 2024-11-13 PROCEDURE — 36415 COLL VENOUS BLD VENIPUNCTURE: CPT | Performed by: FAMILY MEDICINE

## 2024-11-13 PROCEDURE — 96374 THER/PROPH/DIAG INJ IV PUSH: CPT | Performed by: FAMILY MEDICINE

## 2024-11-13 PROCEDURE — 96376 TX/PRO/DX INJ SAME DRUG ADON: CPT | Performed by: FAMILY MEDICINE

## 2024-11-13 PROCEDURE — 250N000011 HC RX IP 250 OP 636: Performed by: EMERGENCY MEDICINE

## 2024-11-13 PROCEDURE — 85004 AUTOMATED DIFF WBC COUNT: CPT | Performed by: FAMILY MEDICINE

## 2024-11-13 PROCEDURE — 93010 ELECTROCARDIOGRAM REPORT: CPT | Performed by: FAMILY MEDICINE

## 2024-11-13 PROCEDURE — 96375 TX/PRO/DX INJ NEW DRUG ADDON: CPT | Performed by: FAMILY MEDICINE

## 2024-11-13 PROCEDURE — 99285 EMERGENCY DEPT VISIT HI MDM: CPT | Mod: 25 | Performed by: FAMILY MEDICINE

## 2024-11-13 PROCEDURE — 71046 X-RAY EXAM CHEST 2 VIEWS: CPT

## 2024-11-13 PROCEDURE — 258N000003 HC RX IP 258 OP 636: Performed by: FAMILY MEDICINE

## 2024-11-13 PROCEDURE — 87637 SARSCOV2&INF A&B&RSV AMP PRB: CPT | Performed by: FAMILY MEDICINE

## 2024-11-13 PROCEDURE — 82947 ASSAY GLUCOSE BLOOD QUANT: CPT | Performed by: FAMILY MEDICINE

## 2024-11-13 PROCEDURE — 84484 ASSAY OF TROPONIN QUANT: CPT | Performed by: FAMILY MEDICINE

## 2024-11-13 PROCEDURE — 93005 ELECTROCARDIOGRAM TRACING: CPT | Performed by: FAMILY MEDICINE

## 2024-11-13 PROCEDURE — 99285 EMERGENCY DEPT VISIT HI MDM: CPT | Performed by: FAMILY MEDICINE

## 2024-11-13 PROCEDURE — 85045 AUTOMATED RETICULOCYTE COUNT: CPT | Performed by: FAMILY MEDICINE

## 2024-11-13 PROCEDURE — 84155 ASSAY OF PROTEIN SERUM: CPT | Performed by: FAMILY MEDICINE

## 2024-11-13 PROCEDURE — 250N000011 HC RX IP 250 OP 636: Performed by: FAMILY MEDICINE

## 2024-11-13 RX ORDER — ONDANSETRON 2 MG/ML
8 INJECTION INTRAMUSCULAR; INTRAVENOUS ONCE
Status: COMPLETED | OUTPATIENT
Start: 2024-11-13 | End: 2024-11-13

## 2024-11-13 RX ORDER — KETOROLAC TROMETHAMINE 30 MG/ML
30 INJECTION, SOLUTION INTRAMUSCULAR; INTRAVENOUS ONCE
Status: COMPLETED | OUTPATIENT
Start: 2024-11-13 | End: 2024-11-13

## 2024-11-13 RX ORDER — HEPARIN SODIUM (PORCINE) LOCK FLUSH IV SOLN 100 UNIT/ML 100 UNIT/ML
5-10 SOLUTION INTRAVENOUS
Status: DISCONTINUED | OUTPATIENT
Start: 2024-11-13 | End: 2024-11-13 | Stop reason: HOSPADM

## 2024-11-13 RX ADMIN — SODIUM CHLORIDE, POTASSIUM CHLORIDE, SODIUM LACTATE AND CALCIUM CHLORIDE 1000 ML: 600; 310; 30; 20 INJECTION, SOLUTION INTRAVENOUS at 15:41

## 2024-11-13 RX ADMIN — HYDROMORPHONE HYDROCHLORIDE 1 MG: 1 INJECTION, SOLUTION INTRAMUSCULAR; INTRAVENOUS; SUBCUTANEOUS at 15:40

## 2024-11-13 RX ADMIN — ONDANSETRON 8 MG: 2 INJECTION INTRAMUSCULAR; INTRAVENOUS at 15:40

## 2024-11-13 RX ADMIN — HEPARIN 5 ML: 100 SYRINGE at 19:12

## 2024-11-13 RX ADMIN — KETOROLAC TROMETHAMINE 30 MG: 30 INJECTION, SOLUTION INTRAMUSCULAR at 15:40

## 2024-11-13 RX ADMIN — HYDROMORPHONE HYDROCHLORIDE 1 MG: 1 INJECTION, SOLUTION INTRAMUSCULAR; INTRAVENOUS; SUBCUTANEOUS at 16:46

## 2024-11-13 RX ADMIN — HYDROMORPHONE HYDROCHLORIDE 1 MG: 1 INJECTION, SOLUTION INTRAMUSCULAR; INTRAVENOUS; SUBCUTANEOUS at 18:19

## 2024-11-13 ASSESSMENT — ACTIVITIES OF DAILY LIVING (ADL)
ADLS_ACUITY_SCORE: 0

## 2024-11-13 ASSESSMENT — ENCOUNTER SYMPTOMS: SICKLE CELL PAIN: 1

## 2024-11-13 NOTE — ED PROVIDER NOTES
ED Provider Note  [facility]      History     Chief Complaint   Patient presents with    Sickle Cell Pain Crisis     Sickle cell pain crisis, infusion center could not get her in and their computers were down as well. Patient is short of breath and tearful in triage       Sickle Cell Pain Crisis    Jennifer Cervantes is a 24 year old female with a PMH significant for sickle cell disease complicated by frequent pain crises, acute chest syndrome (last episode 9/5/2022), history of stroke leading to cognitive delays and right upper and lower extremity hemiparesis, iron overload secondary to chronic transfusions, anxiety/depression, asthma who presents to the emergency department for evaluation of sickle cell pain. She endorses diffuse pain that is most significant in the left shoulder and arm, midline chest, and abdominal pain. Rates her pain 10/10. Pain states she was seen in the ED yesterday for similar symptoms and they initially improved but returned overnight. Reports she attempted to go to the transfusion clinic today but their computers were down and they recommended she go to the ED. States her last infusion was on 11/11. Patient endorses a fever of up to 102 F taken at home and nonproductive cough. Denies shortness of breath, nausea, vomiting, diarrhea, dysuria, hematuria. Endorses taking 2 doses of 10 mg oxycodone and gabapentin at home with no relief to her symptoms.    Past Medical History  Past Medical History:   Diagnosis Date    Anxiety     Bleeding disorder (H)     Blood clotting disorder (H)     Cerebral infarction (H) 2015    Cognitive developmental delay     low IQ. 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 side (H)     right hand contractures    Iron overload due to repeated red blood cell transfusions     Migraines     Multiple subsegmental pulmonary  emboli without acute cor pulmonale (H) 02/01/2021    Oppositional defiant behavior     Presence of intrauterine contraceptive device 2/18/2020    Superficial venous thrombosis of arm, right 03/25/2021    Uncomplicated asthma      Past Surgical History:   Procedure Laterality Date    AS INSERT TUNNELED CV 2 CATH W/O PORT/PUMP      CHOLECYSTECTOMY      CV RIGHT HEART CATH MEASUREMENTS RECORDED N/A 11/18/2021    Procedure: Right Heart Cath;  Surgeon: Jackson Stauffer MD;  Location:  HEART CARDIAC CATH LAB    INSERT PORT VASCULAR ACCESS Left 4/21/2021    Procedure: INSERTION, VASCULAR ACCESS PORT (NOT SURE ON SIDE UNTIL REMOVAL);  Surgeon: Rajan More MD;  Location: [facility] OR    IR CHEST PORT PLACEMENT > 5 YRS OF AGE  4/21/2021    IR CVC NON TUNNEL LINE REMOVAL  5/6/2021    IR CVC NON TUNNEL PLACEMENT > 5 YRS  04/07/2020    IR CVC NON TUNNEL PLACEMENT > 5 YRS  4/30/2021    IR CVC NON TUNNEL PLACEMENT > 5 YRS  9/7/2022    IR PORT REMOVAL LEFT  4/21/2021    REMOVE PORT VASCULAR ACCESS Left 4/21/2021    Procedure: REMOVAL, VASCULAR ACCESS PORT LEFT;  Surgeon: Rajan More MD;  Location: [facility] OR    REPAIR TENDON ELBOW Right 10/02/2019    Procedure: Right Elbow Flexor Lengthening, Flexor Pronator Slide Of Wrist and Finger, Thumb Adductor Lengthening;  Surgeon: Anai Franco MD;  Location: [facility] OR    TONSILLECTOMY Bilateral 10/02/2019    Procedure: Bilateral Tonsillectomy;  Surgeon: Farhana Guy MD;  Location: [facility] OR    ZZC BREAST REDUCTION (INCLUDES LIPO) TIER 3 Bilateral 04/23/2019     albuterol (PROVENTIL) (2.5 MG/3ML) 0.083% neb solution  aspirin (ASA) 81 MG chewable tablet  budesonide-formoterol (SYMBICORT) 160-4.5 MCG/ACT Inhaler  deferasirox (JADENU) 360 MG tablet  Deferiprone, Twice Daily, 1000 MG TABS  diphenhydrAMINE (BENADRYL) 50 MG capsule  EPINEPHrine (ANY BX GENERIC EQUIV) 0.3 MG/0.3ML injection 2-pack  gabapentin (NEURONTIN) 300 MG capsule  hydroxyurea (HYDREA) 500 MG  "capsule  ibuprofen (ADVIL/MOTRIN) 800 MG tablet  melatonin 5 MG tablet  methocarbamol (ROBAXIN) 750 MG tablet  methylPREDNISolone (MEDROL) 32 MG tablet  naloxone (NARCAN) 4 MG/0.1ML nasal spray  ondansetron (ZOFRAN ODT) 8 MG ODT tab  oxyCODONE IR (ROXICODONE) 10 MG tablet  pantoprazole (PROTONIX) 40 MG EC tablet      Allergies   Allergen Reactions    Contrast Dye Angioedema     Hives and breathing issues    Fish-Derived Products Hives    Seafood Hives    Adhesive Tape Hives     Primipore dressing causes hives    Gadolinium     Iodinated Contrast Media      Family History  Family History   Problem Relation Age of Onset    Sickle Cell Trait Mother     Hypertension Mother     Asthma Mother     Sickle Cell Trait Father     Glaucoma No family hx of     Macular Degeneration No family hx of     Diabetes No family hx of     Gout No family hx of      Social History   Social History     Tobacco Use    Smoking status: Never     Passive exposure: Never    Smokeless tobacco: Never   Substance Use Topics    Alcohol use: Not Currently     Alcohol/week: 0.0 standard drinks of alcohol    Drug use: Never      Past medical history, past surgical history, medications, allergies, family history, and social history were reviewed with the patient. No additional pertinent items.   A medically appropriate review of systems was performed with pertinent positives and negatives noted in the HPI, and all other systems negative.    Physical Exam   BP: 132/83  Pulse: 104  Temp: 99.4  F (37.4  C)  Resp: 18  Height: 162.6 cm (5' 4\")  Weight: 72.6 kg (160 lb)  SpO2: 92 %  Physical Exam  Constitutional:       General: She is in acute distress.      Appearance: Normal appearance.   HENT:      Head: Normocephalic and atraumatic.      Nose: No congestion or rhinorrhea.   Cardiovascular:      Rate and Rhythm: Regular rhythm. Tachycardia present.      Heart sounds: No murmur heard.  Pulmonary:      Effort: Pulmonary effort is normal. No respiratory " distress.      Breath sounds: No stridor. No wheezing.   Chest:      Chest wall: No tenderness.   Abdominal:      General: There is no distension.      Palpations: There is no mass.      Tenderness: There is abdominal tenderness. There is no guarding or rebound.      Comments: Mildly tender to palpation over RLQ   Skin:     General: Skin is warm and dry.   Neurological:      Mental Status: She is alert.      Sensory: No sensory deficit.      Motor: Weakness present.      Comments: Strength 3/5 in right upper and lower extremity in setting of known baseline right sided hemiparesis. Strength 5/5 in left upper and lower extremity.            ED Course, Procedures, & Data      Procedures            EKG Interpretation:      Interpreted by Ifeanyi Valdez MD  Time reviewed: 1453  Symptoms at time of EKG: Chest discomfort fever, sickle cell disease  Rhythm: normal sinus   Rate: normal  Axis: normal  Ectopy: none  Conduction: normal  ST Segments/ T Waves: No ST-T wave changes  Q Waves: none  Comparison to prior: Unchanged    Clinical Impression: normal EKG            Results for orders placed or performed during the hospital encounter of 11/13/24   XR Chest 2 Views     Status: None    Narrative    XR CHEST 2 VIEWS   11/13/2024 2:55 PM     HISTORY: sickle Cell disease, chest pain, fever    COMPARISON: Chest CT and radiograph 10/17/2024      Impression    IMPRESSION: Stable size of cardiomediastinal silhouette. Left chest  port with tip overlying the cavoatrial junction. No airspace  consolidation, pleural effusion or pneumothorax. No acute bony  abnormality. Prior cholecystectomy.    CHIP PABLO MD         SYSTEM ID:  [id number]   Comprehensive metabolic panel     Status: Abnormal   Result Value Ref Range    Sodium 140 135 - 145 mmol/L    Potassium 4.1 3.4 - 5.3 mmol/L    Carbon Dioxide (CO2) 21 (L) 22 - 29 mmol/L    Anion Gap 13 7 - 15 mmol/L    Urea Nitrogen 11.0 6.0 - 20.0 mg/dL    Creatinine 0.53 0.51 - 0.95 mg/dL     GFR Estimate >90 >60 mL/min/1.73m2    Calcium 9.2 8.8 - 10.4 mg/dL    Chloride 106 98 - 107 mmol/L    Glucose 92 70 - 99 mg/dL    Alkaline Phosphatase 65 40 - 150 U/L    AST 38 0 - 45 U/L    ALT 31 0 - 50 U/L    Protein Total 7.8 6.4 - 8.3 g/dL    Albumin 4.5 3.5 - 5.2 g/dL    Bilirubin Total 2.1 (H) <=1.2 mg/dL   Troponin T, High Sensitivity     Status: Normal   Result Value Ref Range    Troponin T, High Sensitivity <6 <=14 ng/L   CBC with platelets and differential     Status: Abnormal   Result Value Ref Range    WBC Count 14.3 (H) 4.0 - 11.0 10e3/uL    RBC Count 2.62 (L) 3.80 - 5.20 10e6/uL    Hemoglobin 7.8 (L) 11.7 - 15.7 g/dL    Hematocrit 22.6 (L) 35.0 - 47.0 %    MCV 86 78 - 100 fL    MCH 29.8 26.5 - 33.0 pg    MCHC 34.5 31.5 - 36.5 g/dL    RDW 23.8 (H) 10.0 - 15.0 %    Platelet Count 452 (H) 150 - 450 10e3/uL    % Neutrophils 73 %    % Lymphocytes 15 %    % Monocytes 7 %    % Eosinophils 4 %    % Basophils 2 %    % Immature Granulocytes 1 %    NRBCs per 100 WBC 2 (H) <1 /100    Absolute Neutrophils 10.5 (H) 1.6 - 8.3 10e3/uL    Absolute Lymphocytes 2.1 0.8 - 5.3 10e3/uL    Absolute Monocytes 0.9 0.0 - 1.3 10e3/uL    Absolute Eosinophils 0.5 0.0 - 0.7 10e3/uL    Absolute Basophils 0.3 (H) 0.0 - 0.2 10e3/uL    Absolute Immature Granulocytes 0.1 <=0.4 10e3/uL    Absolute NRBCs 0.3 10e3/uL   EKG 12-lead, tracing only     Status: None (Preliminary result)   Result Value Ref Range    Systolic Blood Pressure  mmHg    Diastolic Blood Pressure  mmHg    Ventricular Rate 98 BPM    Atrial Rate 98 BPM    RI Interval 148 ms    QRS Duration 76 ms     ms    QTc 474 ms    P Axis 75 degrees    R AXIS 30 degrees    T Axis 22 degrees    Interpretation ECG Sinus rhythm  Normal ECG      CBC with platelets differential     Status: Abnormal    Narrative    The following orders were created for panel order CBC with platelets differential.  Procedure                               Abnormality         Status                      ---------                               -----------         ------                     CBC with platelets and d...[[id number]]  Abnormal            Final result                 Please view results for these tests on the individual orders.     Medications   lactated ringers BOLUS 1,000 mL (1,000 mLs Intravenous $New Bag 11/13/24 1541)   HYDROmorphone (DILAUDID) injection 1 mg (1 mg Intravenous $Given 11/13/24 1540)   ketorolac (TORADOL) injection 30 mg (30 mg Intravenous $Given 11/13/24 1540)   ondansetron (ZOFRAN) injection 8 mg (8 mg Intravenous $Given 11/13/24 1540)     Labs Ordered and Resulted from Time of ED Arrival to Time of ED Departure   COMPREHENSIVE METABOLIC PANEL - Abnormal       Result Value    Sodium 140      Potassium 4.1      Carbon Dioxide (CO2) 21 (*)     Anion Gap 13      Urea Nitrogen 11.0      Creatinine 0.53      GFR Estimate >90      Calcium 9.2      Chloride 106      Glucose 92      Alkaline Phosphatase 65      AST 38      ALT 31      Protein Total 7.8      Albumin 4.5      Bilirubin Total 2.1 (*)    CBC WITH PLATELETS AND DIFFERENTIAL - Abnormal    WBC Count 14.3 (*)     RBC Count 2.62 (*)     Hemoglobin 7.8 (*)     Hematocrit 22.6 (*)     MCV 86      MCH 29.8      MCHC 34.5      RDW 23.8 (*)     Platelet Count 452 (*)     % Neutrophils 73      % Lymphocytes 15      % Monocytes 7      % Eosinophils 4      % Basophils 2      % Immature Granulocytes 1      NRBCs per 100 WBC 2 (*)     Absolute Neutrophils 10.5 (*)     Absolute Lymphocytes 2.1      Absolute Monocytes 0.9      Absolute Eosinophils 0.5      Absolute Basophils 0.3 (*)     Absolute Immature Granulocytes 0.1      Absolute NRBCs 0.3     TROPONIN T, HIGH SENSITIVITY - Normal    Troponin T, High Sensitivity <6     RETICULOCYTE COUNT   INFLUENZA A/B, RSV AND SARS-COV2 PCR     XR Chest 2 Views   Final Result   IMPRESSION: Stable size of cardiomediastinal silhouette. Left chest   port with tip overlying the cavoatrial junction. No  airspace   consolidation, pleural effusion or pneumothorax. No acute bony   abnormality. Prior cholecystectomy.      CHIP PABLO MD            SYSTEM ID:  [id number]               Assessment & Plan    Jennifer Cervantes is a 24 year old female with a PMH significant for sickle cell disease complicated by frequent pain crises, acute chest syndrome (last episode 9/5/2022), history of stroke leading to cognitive delays and right upper and lower extremity hemiparesis, iron overload secondary to chronic transfusions, anxiety/depression, asthma presenting for a sickle cell pain crisis. She is having significant chest, abdominal, and left arm pain. Vitals notable for tachycardia, SpO2 92% on room air upon arrival. On exam, she is in mild distress due to pain but is non-toxic and well-appearing, lungs CTAB. Due to acute chest pain in setting of sickle cell pain crisis, considered acute chest syndrome, however troponin negative and EKG did not reveal ST elevations or T wave changes. Given patient's reported fever and cough will also obtain flu/Covid/RSV panel to assess for respiratory infection contributing to patient's decreased oxygen saturation. CXR negative for consolidation, pleural effusion, or PTX.  White blood cell count slightly increased.  Opponent less than 6, no EKG changes, I doubt acute coronary syndrome.  There is no sign of acute chest syndrome.  Patient provided pain control per care coordination plan.    I have reviewed the nursing notes. I have reviewed the findings, diagnosis, plan and need for follow up with the patient.    New Prescriptions    No medications on file       Final diagnoses:   Sickle cell pain crisis (H)     Milli Piper, MS4  [facility] [employer]    Ifeanyi Valdez MD  [facility] EMERGENCY DEPARTMENT    --    ED Attending Physician Attestation    I Ifeanyi Valdez MD, cared for this patient with the Medical Student. I performed, or re-performed, the physical exam and medical  decision-making. I have verified the accuracy of all the medical student findings and documentation above, and have edited as necessary.    Summary of HPI, PE, ED Course   Patient is a 24 year old female evaluated in the emergency department for patient with sickle cell disease presenting with fever, left shoulder pain, left arm pain, cough, mild chest pain.  Symptoms typical of multiple previous presentation however this time endorsing a fever and cough being more prominent.  Seen in the ED twice already this week, was planning to go to infusion center today but could not due to computer malfunction.. Exam and ED course notable for patient's room air oxygen saturation 92 to 94% but is not in respiratory distress.  Physical exam is otherwise unremarkable.  EKG, chest x-ray, troponin, and hemoglobin are unremarkable.  White blood cell count slightly elevated.. After the completion of care in the emergency department, the patient was discharged.           Critical care was not performed.     Medical Decision Making  The patient's presentation was of moderate complexity (a chronic illness mild to moderate exacerbation, progression, or side effect of treatment).    The patient's evaluation involved:  review of external note(s) from 3+ sources (reviewed ED care plan in epic also multiple prior ED visits earlier this month and last month for sickle cell pain, review of most recent hematology notes)  review of 3+ test result(s) ordered prior to this encounter (prior hemoglobin, retake count, chest x-ray and EKG reviewed for comparison to today)  ordering and/or review of 3+ test(s) in this encounter (see separate area of note for details)  independent interpretation of testing performed by another health professional (x-ray images personally reviewed reviewed radius interpretation)    The patient's management necessitated moderate risk (prescription drug management including medications given in the ED), high risk (a  parenteral controlled substance), and further care after sign-out to Dr Pozo (see their note for further management).    Ifeanyi Valdez MD  Emergency Medicine     11/13/2024     Ifeanyi Valdez MD  11/14/24 103

## 2024-11-13 NOTE — ED TRIAGE NOTES
Patient reports a sickle cell pain crisis that started this morning. Patient is short of breath and having chest pain. Patient reports she is also having abdominal pain. Patient tried to go to the infusion center but their computers were down and they could not get her in.

## 2024-11-13 NOTE — TELEPHONE ENCOUNTER
Jennifer called checking in on infusion appt status,   This writer called infusion centers, as of 1303 there are no appts available for rest of the day.   This writer informed Jennifer if needs to go to ED and can't wait to try again tomorrow to go to ED.

## 2024-11-13 NOTE — TELEPHONE ENCOUNTER
"Late entry call from 1242pm-  Pt calling to check availability of infusion wait list before she leaves building. She is aware charting system is down. Writer informed infusion centers are currently full. Pt said, \"you guys are of no help, but it's ok\" and hung up on writer.   "

## 2024-11-13 NOTE — TELEPHONE ENCOUNTER
Oncology Nurse Triage - Sickle Cell Pain Crisis:     Situation: Jennifer  calling about Sickle Cell Pain Crisis, requesting to be added on for IV fluids and pain medicine     Background:      Patient's last infusion was ED on Saturday 11/13/2024  Last clinic visit date:10/31/2024 Patricia Mantilla CNP  Does patient have active treatment plan?  Yes        Assessment of Symptoms:  Onset/Duration of symptoms: 1day     Is it typical sickle cell pain? Yes   Location: general all over  Character:Dull           Intensity: 7/10     Any radiation of pain, numbness, tingling, weakness, warmth, swelling, discoloration of arms or legs?No      Fever?No      Chest Pain Present: No      Shortness of breath: No      Other home therapies tried: HEAT/HEATING PAD and WARM BATH      Last home medication taken and when: this morning around 7am     Any Refills Needed?: No     Does patient have transportation & length of time to get to clinic: has transportation to clinic, may need a ride home.            Recommendations:   0720 Pending paged Dr. Villagran    0730 Per dr. Sparkle vargas for IVF/Pain per therapy plan today.      Please note, if you are late for your appt, you risk losing your infusion appt as it may delay another patient's infusion who arrived on time.

## 2024-11-14 ENCOUNTER — HOSPITAL ENCOUNTER (EMERGENCY)
Facility: CLINIC | Age: 24
Discharge: HOME | End: 2024-11-15
Attending: EMERGENCY MEDICINE | Admitting: EMERGENCY MEDICINE
Payer: COMMERCIAL

## 2024-11-14 ENCOUNTER — NURSE TRIAGE (OUTPATIENT)
Dept: ONCOLOGY | Facility: CLINIC | Age: 24
End: 2024-11-14
Payer: COMMERCIAL

## 2024-11-14 ENCOUNTER — APPOINTMENT (OUTPATIENT)
Dept: ULTRASOUND IMAGING | Facility: CLINIC | Age: 24
End: 2024-11-14
Attending: EMERGENCY MEDICINE
Payer: COMMERCIAL

## 2024-11-14 ENCOUNTER — MYC MEDICAL ADVICE (OUTPATIENT)
Dept: ONCOLOGY | Facility: CLINIC | Age: 24
End: 2024-11-14

## 2024-11-14 ENCOUNTER — INFUSION THERAPY VISIT (OUTPATIENT)
Dept: INFUSION THERAPY | Facility: CLINIC | Age: 24
End: 2024-11-14
Attending: PEDIATRICS
Payer: COMMERCIAL

## 2024-11-14 ENCOUNTER — PATIENT OUTREACH (OUTPATIENT)
Dept: CARE COORDINATION | Facility: CLINIC | Age: 24
End: 2024-11-14
Payer: COMMERCIAL

## 2024-11-14 ENCOUNTER — VIRTUAL VISIT (OUTPATIENT)
Dept: PHARMACY | Facility: CLINIC | Age: 24
End: 2024-11-14
Payer: COMMERCIAL

## 2024-11-14 VITALS
OXYGEN SATURATION: 94 % | RESPIRATION RATE: 16 BRPM | HEART RATE: 99 BPM | SYSTOLIC BLOOD PRESSURE: 107 MMHG | DIASTOLIC BLOOD PRESSURE: 67 MMHG | TEMPERATURE: 98.1 F

## 2024-11-14 DIAGNOSIS — D57.00 SICKLE CELL PAIN CRISIS (H): Primary | ICD-10-CM

## 2024-11-14 DIAGNOSIS — D57.00 SICKLE CELL PAIN CRISIS (H): ICD-10-CM

## 2024-11-14 DIAGNOSIS — G81.10 SPASTIC HEMIPLEGIA, UNSPECIFIED ETIOLOGY, UNSPECIFIED LATERALITY (H): ICD-10-CM

## 2024-11-14 DIAGNOSIS — J45.40 MODERATE PERSISTENT ASTHMA, UNSPECIFIED WHETHER COMPLICATED: Primary | ICD-10-CM

## 2024-11-14 LAB
ALBUMIN SERPL BCG-MCNC: 4.2 G/DL (ref 3.5–5.2)
ALP SERPL-CCNC: 59 U/L (ref 40–150)
ALT SERPL W P-5'-P-CCNC: 28 U/L (ref 0–50)
ANION GAP SERPL CALCULATED.3IONS-SCNC: 10 MMOL/L (ref 7–15)
AST SERPL W P-5'-P-CCNC: 37 U/L (ref 0–45)
ATRIAL RATE - MUSE: 98 BPM
BACTERIA UR CULT: NORMAL
BASOPHILS # BLD AUTO: 0.2 10E3/UL (ref 0–0.2)
BASOPHILS NFR BLD AUTO: 2 %
BILIRUB SERPL-MCNC: 2 MG/DL
BUN SERPL-MCNC: 9.2 MG/DL (ref 6–20)
CALCIUM SERPL-MCNC: 8.7 MG/DL (ref 8.8–10.4)
CHLORIDE SERPL-SCNC: 107 MMOL/L (ref 98–107)
CREAT SERPL-MCNC: 0.64 MG/DL (ref 0.51–0.95)
DIASTOLIC BLOOD PRESSURE - MUSE: NORMAL MMHG
EGFRCR SERPLBLD CKD-EPI 2021: >90 ML/MIN/1.73M2
EOSINOPHIL # BLD AUTO: 0.5 10E3/UL (ref 0–0.7)
EOSINOPHIL NFR BLD AUTO: 4 %
ERYTHROCYTE [DISTWIDTH] IN BLOOD BY AUTOMATED COUNT: 23.9 % (ref 10–15)
GLUCOSE SERPL-MCNC: 96 MG/DL (ref 70–99)
HCO3 SERPL-SCNC: 25 MMOL/L (ref 22–29)
HCT VFR BLD AUTO: 20.2 % (ref 35–47)
HGB BLD-MCNC: 7.2 G/DL (ref 11.7–15.7)
IMM GRANULOCYTES # BLD: 0 10E3/UL
IMM GRANULOCYTES NFR BLD: 0 %
INTERPRETATION ECG - MUSE: NORMAL
LYMPHOCYTES # BLD AUTO: 2.5 10E3/UL (ref 0.8–5.3)
LYMPHOCYTES NFR BLD AUTO: 19 %
MCH RBC QN AUTO: 30.6 PG (ref 26.5–33)
MCHC RBC AUTO-ENTMCNC: 35.6 G/DL (ref 31.5–36.5)
MCV RBC AUTO: 86 FL (ref 78–100)
MONOCYTES # BLD AUTO: 0.9 10E3/UL (ref 0–1.3)
MONOCYTES NFR BLD AUTO: 7 %
NEUTROPHILS # BLD AUTO: 9.2 10E3/UL (ref 1.6–8.3)
NEUTROPHILS NFR BLD AUTO: 69 %
NRBC # BLD AUTO: 0.2 10E3/UL
NRBC BLD AUTO-RTO: 2 /100
P AXIS - MUSE: 75 DEGREES
PLATELET # BLD AUTO: 430 10E3/UL (ref 150–450)
POTASSIUM SERPL-SCNC: 3.7 MMOL/L (ref 3.4–5.3)
PR INTERVAL - MUSE: 148 MS
PROT SERPL-MCNC: 7 G/DL (ref 6.4–8.3)
QRS DURATION - MUSE: 76 MS
QT - MUSE: 372 MS
QTC - MUSE: 474 MS
R AXIS - MUSE: 30 DEGREES
RBC # BLD AUTO: 2.35 10E6/UL (ref 3.8–5.2)
SODIUM SERPL-SCNC: 142 MMOL/L (ref 135–145)
SYSTOLIC BLOOD PRESSURE - MUSE: NORMAL MMHG
T AXIS - MUSE: 22 DEGREES
VENTRICULAR RATE- MUSE: 98 BPM
WBC # BLD AUTO: 13.3 10E3/UL (ref 4–11)

## 2024-11-14 PROCEDURE — 250N000011 HC RX IP 250 OP 636: Performed by: EMERGENCY MEDICINE

## 2024-11-14 PROCEDURE — 250N000011 HC RX IP 250 OP 636: Performed by: PEDIATRICS

## 2024-11-14 PROCEDURE — 85025 COMPLETE CBC W/AUTO DIFF WBC: CPT | Performed by: EMERGENCY MEDICINE

## 2024-11-14 PROCEDURE — 96376 TX/PRO/DX INJ SAME DRUG ADON: CPT | Performed by: EMERGENCY MEDICINE

## 2024-11-14 PROCEDURE — 82040 ASSAY OF SERUM ALBUMIN: CPT | Performed by: EMERGENCY MEDICINE

## 2024-11-14 PROCEDURE — 93971 EXTREMITY STUDY: CPT | Mod: LT

## 2024-11-14 PROCEDURE — 96374 THER/PROPH/DIAG INJ IV PUSH: CPT | Performed by: EMERGENCY MEDICINE

## 2024-11-14 PROCEDURE — 96375 TX/PRO/DX INJ NEW DRUG ADDON: CPT

## 2024-11-14 PROCEDURE — 96374 THER/PROPH/DIAG INJ IV PUSH: CPT

## 2024-11-14 PROCEDURE — 99285 EMERGENCY DEPT VISIT HI MDM: CPT | Mod: 25 | Performed by: EMERGENCY MEDICINE

## 2024-11-14 PROCEDURE — 99606 MTMS BY PHARM EST 15 MIN: CPT | Mod: 93

## 2024-11-14 PROCEDURE — 36415 COLL VENOUS BLD VENIPUNCTURE: CPT | Performed by: EMERGENCY MEDICINE

## 2024-11-14 PROCEDURE — 96376 TX/PRO/DX INJ SAME DRUG ADON: CPT

## 2024-11-14 PROCEDURE — 96361 HYDRATE IV INFUSION ADD-ON: CPT | Performed by: EMERGENCY MEDICINE

## 2024-11-14 PROCEDURE — 99284 EMERGENCY DEPT VISIT MOD MDM: CPT | Performed by: EMERGENCY MEDICINE

## 2024-11-14 PROCEDURE — 258N000003 HC RX IP 258 OP 636: Performed by: EMERGENCY MEDICINE

## 2024-11-14 PROCEDURE — 96375 TX/PRO/DX INJ NEW DRUG ADDON: CPT | Performed by: EMERGENCY MEDICINE

## 2024-11-14 PROCEDURE — 258N000003 HC RX IP 258 OP 636: Performed by: PEDIATRICS

## 2024-11-14 PROCEDURE — 250N000013 HC RX MED GY IP 250 OP 250 PS 637: Performed by: PEDIATRICS

## 2024-11-14 PROCEDURE — 96361 HYDRATE IV INFUSION ADD-ON: CPT

## 2024-11-14 PROCEDURE — 82435 ASSAY OF BLOOD CHLORIDE: CPT | Performed by: EMERGENCY MEDICINE

## 2024-11-14 PROCEDURE — 82565 ASSAY OF CREATININE: CPT | Performed by: EMERGENCY MEDICINE

## 2024-11-14 RX ORDER — KETOROLAC TROMETHAMINE 30 MG/ML
30 INJECTION, SOLUTION INTRAMUSCULAR; INTRAVENOUS ONCE
Start: 2025-01-01 | End: 2025-01-01

## 2024-11-14 RX ORDER — HEPARIN SODIUM (PORCINE) LOCK FLUSH IV SOLN 100 UNIT/ML 100 UNIT/ML
5 SOLUTION INTRAVENOUS
OUTPATIENT
Start: 2025-01-01

## 2024-11-14 RX ORDER — HEPARIN SODIUM,PORCINE 10 UNIT/ML
5 VIAL (ML) INTRAVENOUS
OUTPATIENT
Start: 2025-01-01

## 2024-11-14 RX ORDER — DIPHENHYDRAMINE HCL 25 MG
50 CAPSULE ORAL
Start: 2025-01-01

## 2024-11-14 RX ORDER — HEPARIN SODIUM (PORCINE) LOCK FLUSH IV SOLN 100 UNIT/ML 100 UNIT/ML
5 SOLUTION INTRAVENOUS
Status: DISCONTINUED | OUTPATIENT
Start: 2024-11-14 | End: 2024-11-14 | Stop reason: HOSPADM

## 2024-11-14 RX ORDER — SODIUM CHLORIDE, SODIUM LACTATE, POTASSIUM CHLORIDE, CALCIUM CHLORIDE 600; 310; 30; 20 MG/100ML; MG/100ML; MG/100ML; MG/100ML
INJECTION, SOLUTION INTRAVENOUS CONTINUOUS
Status: DISCONTINUED | OUTPATIENT
Start: 2024-11-14 | End: 2024-11-15 | Stop reason: HOSPADM

## 2024-11-14 RX ORDER — DIPHENHYDRAMINE HCL 25 MG
50 CAPSULE ORAL
Status: COMPLETED | OUTPATIENT
Start: 2024-11-14 | End: 2024-11-14

## 2024-11-14 RX ORDER — ONDANSETRON 2 MG/ML
8 INJECTION INTRAMUSCULAR; INTRAVENOUS EVERY 6 HOURS PRN
Start: 2025-01-01

## 2024-11-14 RX ORDER — KETOROLAC TROMETHAMINE 30 MG/ML
30 INJECTION, SOLUTION INTRAMUSCULAR; INTRAVENOUS ONCE
Status: COMPLETED | OUTPATIENT
Start: 2024-11-14 | End: 2024-11-14

## 2024-11-14 RX ORDER — ONDANSETRON 4 MG/1
8 TABLET, FILM COATED ORAL
Start: 2025-01-01

## 2024-11-14 RX ORDER — ONDANSETRON 2 MG/ML
8 INJECTION INTRAMUSCULAR; INTRAVENOUS EVERY 6 HOURS PRN
Status: DISCONTINUED | OUTPATIENT
Start: 2024-11-14 | End: 2024-11-14 | Stop reason: HOSPADM

## 2024-11-14 RX ORDER — ONDANSETRON 2 MG/ML
8 INJECTION INTRAMUSCULAR; INTRAVENOUS ONCE
Status: COMPLETED | OUTPATIENT
Start: 2024-11-14 | End: 2024-11-14

## 2024-11-14 RX ADMIN — HYDROMORPHONE HYDROCHLORIDE 1 MG: 1 INJECTION, SOLUTION INTRAMUSCULAR; INTRAVENOUS; SUBCUTANEOUS at 22:33

## 2024-11-14 RX ADMIN — KETOROLAC TROMETHAMINE 30 MG: 30 INJECTION, SOLUTION INTRAMUSCULAR; INTRAVENOUS at 11:58

## 2024-11-14 RX ADMIN — HYDROMORPHONE HYDROCHLORIDE 1 MG: 1 INJECTION, SOLUTION INTRAMUSCULAR; INTRAVENOUS; SUBCUTANEOUS at 11:50

## 2024-11-14 RX ADMIN — SODIUM CHLORIDE, POTASSIUM CHLORIDE, SODIUM LACTATE AND CALCIUM CHLORIDE: 600; 310; 30; 20 INJECTION, SOLUTION INTRAVENOUS at 21:51

## 2024-11-14 RX ADMIN — SODIUM CHLORIDE, POTASSIUM CHLORIDE, SODIUM LACTATE AND CALCIUM CHLORIDE 1000 ML: 600; 310; 30; 20 INJECTION, SOLUTION INTRAVENOUS at 11:46

## 2024-11-14 RX ADMIN — ONDANSETRON 8 MG: 2 INJECTION INTRAMUSCULAR; INTRAVENOUS at 11:53

## 2024-11-14 RX ADMIN — ONDANSETRON 8 MG: 2 INJECTION INTRAMUSCULAR; INTRAVENOUS at 21:49

## 2024-11-14 RX ADMIN — Medication 5 ML: at 14:04

## 2024-11-14 RX ADMIN — KETOROLAC TROMETHAMINE 30 MG: 30 INJECTION, SOLUTION INTRAMUSCULAR at 21:53

## 2024-11-14 RX ADMIN — HYDROMORPHONE HYDROCHLORIDE 1 MG: 1 INJECTION, SOLUTION INTRAMUSCULAR; INTRAVENOUS; SUBCUTANEOUS at 12:48

## 2024-11-14 RX ADMIN — HYDROMORPHONE HYDROCHLORIDE 1 MG: 1 INJECTION, SOLUTION INTRAMUSCULAR; INTRAVENOUS; SUBCUTANEOUS at 21:50

## 2024-11-14 RX ADMIN — DIPHENHYDRAMINE HYDROCHLORIDE 50 MG: 25 CAPSULE ORAL at 11:52

## 2024-11-14 RX ADMIN — HYDROMORPHONE HYDROCHLORIDE 1 MG: 1 INJECTION, SOLUTION INTRAMUSCULAR; INTRAVENOUS; SUBCUTANEOUS at 13:47

## 2024-11-14 RX ADMIN — HYDROMORPHONE HYDROCHLORIDE 1 MG: 1 INJECTION, SOLUTION INTRAMUSCULAR; INTRAVENOUS; SUBCUTANEOUS at 23:41

## 2024-11-14 ASSESSMENT — ACTIVITIES OF DAILY LIVING (ADL)
ADLS_ACUITY_SCORE: 0

## 2024-11-14 ASSESSMENT — ASTHMA QUESTIONNAIRES
QUESTION_2 LAST FOUR WEEKS HOW OFTEN HAVE YOU HAD SHORTNESS OF BREATH: ONCE OR TWICE A WEEK
ACT_TOTALSCORE: 22
QUESTION_5 LAST FOUR WEEKS HOW WOULD YOU RATE YOUR ASTHMA CONTROL: WELL CONTROLLED
QUESTION_3 LAST FOUR WEEKS HOW OFTEN DID YOUR ASTHMA SYMPTOMS (WHEEZING, COUGHING, SHORTNESS OF BREATH, CHEST TIGHTNESS OR PAIN) WAKE YOU UP AT NIGHT OR EARLIER THAN USUAL IN THE MORNING: NOT AT ALL
QUESTION_4 LAST FOUR WEEKS HOW OFTEN HAVE YOU USED YOUR RESCUE INHALER OR NEBULIZER MEDICATION (SUCH AS ALBUTEROL): NOT AT ALL
QUESTION_1 LAST FOUR WEEKS HOW MUCH OF THE TIME DID YOUR ASTHMA KEEP YOU FROM GETTING AS MUCH DONE AT WORK, SCHOOL OR AT HOME: A LITTLE OF THE TIME
ACT_TOTALSCORE: 22

## 2024-11-14 NOTE — PATIENT INSTRUCTIONS
"Recommendations from today's MTM visit:                                                       No changes, continue Symbicort 2 puffs twice daily and 1-2 puffs as needed     Follow-up: via phone in 3 months  Thursday Feb 13, 2025   Appt at 3:00 PM (1 hr)  Telephone      It was great speaking with you today.  I value your experience and would be very thankful for your time in providing feedback in our clinic survey. In the next few days, you may receive an email or text message from [employer] with a link to a survey related to your  clinical pharmacist.\"     To schedule another MTM appointment, please call the clinic directly or you may call the MTM scheduling line at [phone] or toll-free at [phone].     My Clinical Pharmacist's contact information:                                                      Please feel free to contact me with any questions or concerns you have.      [place] (Hailie), PharmD, MPH  Medication Therapy Management Pharmacist    "

## 2024-11-14 NOTE — PROGRESS NOTES
Social Work - Transportation  [facility]    Data/Intervention:  Patient Name: Jennifer Cervantes Goes By: Jennifer MARTIN/Age: 2000 (24 year old)    Referral From: Patient  Reason for Referral:  support requested for patient transportation needs for today's infusion appointment.  Assessment:  called [facility] to arrange ride home after infusion appointment. [employer] is set to pick patient up at 2 PM.  Plan: Patient is aware of the transportation plan.  available to assist with any other needs.    CARLOS Chavez,SW  Hematology/Oncology Social Worker  Phone:[phone] Pager: [phone]

## 2024-11-14 NOTE — PROGRESS NOTES
Infusion Nursing Note:  Jennifer Cervantes presents today for IVF, pain meds, anti emetics, anti histamine.    Patient seen by provider today: No   present during visit today: Not Applicable.    Note: Per orders and per patient needs/request, below IVF medications administered.     Administrations This Visit       diphenhydrAMINE (BENADRYL) capsule 50 mg       Admin Date  11/14/2024 Action  $Given Dose  50 mg Route  Oral Documented By  Line Camacho RN              heparin lock flush 100 unit/mL injection 5 mL       Admin Date  11/14/2024 Action  $Given Dose  5 mL Route  Intracatheter Documented By  Claudia Landry RN              HYDROmorphone (DILAUDID) injection 1 mg       Admin Date  11/14/2024 Action  $Given Dose  1 mg Route  Intravenous Documented By  Lien Camacho RN               Admin Date  11/14/2024 Action  $Given Dose  1 mg Route  Intravenous Documented By  Lien Camacho RN               Admin Date  11/14/2024 Action  $Given Dose  1 mg Route  Intravenous Documented By  Lien Camacho RN              ketorolac (TORADOL) injection 30 mg       Admin Date  11/14/2024 Action  $Given Dose  30 mg Route  Intravenous Documented By  Lien Camacho RN              lactated ringers BOLUS 1,000 mL       Admin Date  11/14/2024 Action  $New Bag Dose  1,000 mL Rate  500 mL/hr Route  Intravenous Documented By  Lien Camacho RN              ondansetron (ZOFRAN) injection 8 mg       Admin Date  11/14/2024 Action  $Given Dose  8 mg Route  Intravenous Documented By  Lien Camacho RN                     Intravenous Access:  Implanted Port.    Treatment Conditions:  None.      Post Infusion Assessment:  Patient tolerated infusion without incident.  Blood return noted pre and post infusion.  Site patent and intact, free from redness, edema or discomfort.  No evidence of extravasations.       Discharge Plan:   Discharge instructions reviewed with: Patient.  Patient and/or family verbalized understanding of discharge instructions  and all questions answered.  AVS to patient via [employer].  Patient will return to her provider or [facility] as needed for next appointment.   Patient discharged in stable condition accompanied by: self.  Departure Mode: Ambulatory.    /67   Pulse 99   Temp 98.1  F (36.7  C) (Tympanic)   Resp 16   SpO2 94%        Lien Camacho RN

## 2024-11-14 NOTE — PATIENT INSTRUCTIONS
Dear Jennifer Cervantes    Thank you for choosing [facility] ([facility] for your infusion.  The following information is a summary of our appointment as well as important reminders.          If you have any questions on your upcoming Specialty Infusion appointments, please call scheduling at [phone].  It was a pleasure taking care of you today.    Sincerely,    [facility]  [address] [place] [address]  [address]  Phone:  [phone]

## 2024-11-14 NOTE — TELEPHONE ENCOUNTER
Oncology Nurse Triage - Sickle Cell Pain Crisis:  Situation: Jennifer  calling about Sickle Cell Pain Crisis, requesting to be added on for IV fluids and pain medicine    Background:   Patient's last infusion was 11/13 in ED  Last clinic visit date:10/31/24 with Patricia Mantilla CNP  Does patient have active treatment plan?  Yes    Assessment of Symptoms:  Onset/Duration of symptoms: 2 day    Is it typical sickle cell pain? Yes   Location: all over  Character: Dull ache           Intensity: 7/10    Any radiation of pain, numbness, tingling, weakness, warmth, swelling, discoloration of arms or legs?No     Fever?No  Chest Pain Present: No   Shortness of breath: No     Other home therapies tried: HEAT/HEATING PAD and WARM BATH   Last home medication taken and when:     Any Refills Needed?: No   Does patient have transportation & length of time to get to clinic: Yes if there is an early opening; 15-20 min    Recommendations:   If you do not hear from the infusion center by 2pm then you will not be able to get in for an infusion today. If symptoms worsen while waiting for call back, and/or you experience fever, chills, SOB, chest pain, cough, n/v, dizziness, numbness, swelling, discoloration of extremities, then seek emergency evaluation in Emergency Department.     Added to infusion wait list.  1019: Secure chat sent to Jalyn Antony CNP to see if okay for pt to get infusion today since she was in ED yesterday.    1037: [facility] can offer 1100 apt for Jennifer. Jennifer confirmed she can come to this apt. Updated Infusion Charge Nurse. Message sent to [facility] to schedule pt for infusion today in [facility] at 1100    Message routed to Care Team.

## 2024-11-14 NOTE — PROGRESS NOTES
Medication Therapy Management (MTM) Encounter    ASSESSMENT:                            Medication Adherence/Access: No issues identified.    Asthma  Stable, ACT score is 22 today.    PLAN:                            No changes, continue Symbicort 2 puffs twice daily and as needed    Follow-up: via phone in 3 months  Thursday Feb 13, 2025   Appt at 3:00 PM (1 hr)  Telephone      SUBJECTIVE/OBJECTIVE:                          Jennifer Cervantes is a 24 year old female seen for a follow-up visit.       Reason for visit: Asthma follow up.    Allergies/ADRs: Reviewed in chart  Past Medical History: Reviewed in chart  Tobacco: She reports that she has never smoked. She has never been exposed to tobacco smoke. She has never used smokeless tobacco.  Alcohol: none    Medication Adherence/Access: no issues reported.    Asthma   Symbicort 160-4.5 mcg 2 puffs twice daily and 1-2 puffs as needed  Albuterol 0.083% 2 vials every 6 hours as needed -  uses more in the fall and winter  Patient rinses their mouth after using steroid inhaler.   Patient reports no current medication side effects.      Triggers include: strong odors and fumes and cold air.  Patient reports the following symptoms: breathing is significantly improved with Symbicort. Notes she hasn't needed to use it as a rescue inhaler, but is using it twice daily every day. Is no longer using albuterol inhaler.         7/3/2024     8:40 AM 9/30/2024     1:06 PM 11/14/2024    11:34 AM   ACT Total Scores   ACT TOTAL SCORE (Goal Greater than or Equal to 20) 11 12 22   In the past 12 months, how many times did you visit the emergency room for your asthma without being admitted to the hospital? 3  2     In the past 12 months, how many times were you hospitalized overnight because of your asthma? 3  0       Today's Vitals: There were no vitals taken for this visit.  ----------------    I spent 8 minutes with this patient today. All changes were made via collaborative practice  agreement with Suraj Case MD. A copy of the visit note was provided to the patient's provider(s).    A summary of these recommendations was sent via [employer].    [place] PharmD (Hailie), MPH  Medication Therapy Management Pharmacist     Telemedicine Visit Details  The patient's medications can be safely assessed via a telemedicine encounter.  Type of service:  Telephone visit  Originating Location (pt. Location): Home    Distant Location (provider location):  On-site  Start Time:  11:30 AM  End Time:  11:38 AM     Medication Therapy Recommendations  No medication therapy recommendations to display

## 2024-11-14 NOTE — ED PROVIDER NOTES
"     Emergency Department Patient Sign-out       Brief HPI:  This is a 24 year old female signed out to me by Dr. Valdez .  See initial ED Provider note for details of the presentation.            Significant Events prior to my assuming care: received pain meds per care plan. Negative CXR      Exam:   Patient Vitals for the past 24 hrs:   BP Temp Temp src Pulse Resp SpO2 Height Weight   11/13/24 1410 132/83 99.4  F (37.4  C) Oral 104 18 92 % 1.626 m (5' 4\") 72.6 kg (160 lb)           ED RESULTS:   Results for orders placed or performed during the hospital encounter of 11/13/24 (from the past 24 hours)   EKG 12-lead, tracing only     Status: None (Preliminary result)    Collection Time: 11/13/24  2:53 PM   Result Value Ref Range    Systolic Blood Pressure  mmHg    Diastolic Blood Pressure  mmHg    Ventricular Rate 98 BPM    Atrial Rate 98 BPM    WA Interval 148 ms    QRS Duration 76 ms     ms    QTc 474 ms    P Axis 75 degrees    R AXIS 30 degrees    T Axis 22 degrees    Interpretation ECG Sinus rhythm  Normal ECG      XR Chest 2 Views     Status: None    Collection Time: 11/13/24  2:55 PM    Narrative    XR CHEST 2 VIEWS   11/13/2024 2:55 PM     HISTORY: sickle Cell disease, chest pain, fever    COMPARISON: Chest CT and radiograph 10/17/2024      Impression    IMPRESSION: Stable size of cardiomediastinal silhouette. Left chest  port with tip overlying the cavoatrial junction. No airspace  consolidation, pleural effusion or pneumothorax. No acute bony  abnormality. Prior cholecystectomy.    CHIP PABLO MD         SYSTEM ID:  [id number]   CBC with platelets differential     Status: Abnormal    Collection Time: 11/13/24  3:31 PM    Narrative    The following orders were created for panel order CBC with platelets differential.  Procedure                               Abnormality         Status                     ---------                               -----------         ------                     CBC with " platelets and d...[[id number]]  Abnormal            Final result                 Please view results for these tests on the individual orders.   Comprehensive metabolic panel     Status: Abnormal    Collection Time: 11/13/24  3:31 PM   Result Value Ref Range    Sodium 140 135 - 145 mmol/L    Potassium 4.1 3.4 - 5.3 mmol/L    Carbon Dioxide (CO2) 21 (L) 22 - 29 mmol/L    Anion Gap 13 7 - 15 mmol/L    Urea Nitrogen 11.0 6.0 - 20.0 mg/dL    Creatinine 0.53 0.51 - 0.95 mg/dL    GFR Estimate >90 >60 mL/min/1.73m2    Calcium 9.2 8.8 - 10.4 mg/dL    Chloride 106 98 - 107 mmol/L    Glucose 92 70 - 99 mg/dL    Alkaline Phosphatase 65 40 - 150 U/L    AST 38 0 - 45 U/L    ALT 31 0 - 50 U/L    Protein Total 7.8 6.4 - 8.3 g/dL    Albumin 4.5 3.5 - 5.2 g/dL    Bilirubin Total 2.1 (H) <=1.2 mg/dL   Troponin T, High Sensitivity     Status: Normal    Collection Time: 11/13/24  3:31 PM   Result Value Ref Range    Troponin T, High Sensitivity <6 <=14 ng/L   Reticulocyte count     Status: Abnormal    Collection Time: 11/13/24  3:31 PM   Result Value Ref Range    % Reticulocyte 19.7 (H) 0.5 - 2.0 %    Absolute Reticulocyte 0.484 (H) 0.025 - 0.095 10e6/uL   CBC with platelets and differential     Status: Abnormal    Collection Time: 11/13/24  3:31 PM   Result Value Ref Range    WBC Count 14.3 (H) 4.0 - 11.0 10e3/uL    RBC Count 2.62 (L) 3.80 - 5.20 10e6/uL    Hemoglobin 7.8 (L) 11.7 - 15.7 g/dL    Hematocrit 22.6 (L) 35.0 - 47.0 %    MCV 86 78 - 100 fL    MCH 29.8 26.5 - 33.0 pg    MCHC 34.5 31.5 - 36.5 g/dL    RDW 23.8 (H) 10.0 - 15.0 %    Platelet Count 452 (H) 150 - 450 10e3/uL    % Neutrophils 73 %    % Lymphocytes 15 %    % Monocytes 7 %    % Eosinophils 4 %    % Basophils 2 %    % Immature Granulocytes 1 %    NRBCs per 100 WBC 2 (H) <1 /100    Absolute Neutrophils 10.5 (H) 1.6 - 8.3 10e3/uL    Absolute Lymphocytes 2.1 0.8 - 5.3 10e3/uL    Absolute Monocytes 0.9 0.0 - 1.3 10e3/uL    Absolute Eosinophils 0.5 0.0 - 0.7 10e3/uL     Absolute Basophils 0.3 (H) 0.0 - 0.2 10e3/uL    Absolute Immature Granulocytes 0.1 <=0.4 10e3/uL    Absolute NRBCs 0.3 10e3/uL   Influenza A/B, RSV, & SARS-CoV2 PCR (COVID-19) Nasopharyngeal     Status: Normal    Collection Time: 11/13/24  3:52 PM    Specimen: Nasopharyngeal; Swab   Result Value Ref Range    Influenza A PCR Negative Negative    Influenza B PCR Negative Negative    RSV PCR Negative Negative    SARS CoV2 PCR Negative Negative    Narrative    Testing was performed using the Xpert Xpress CoV2/Flu/RSV Assay on the Cepheid GeneXpert Instrument. This test should be ordered for the detection of SARS-CoV-2, influenza, and RSV viruses in individuals who meet clinical and/or epidemiological criteria. Test performance is unknown in asymptomatic patients. This test is for in vitro diagnostic use under the FDA EUA for laboratories certified under CLIA to perform high or moderate complexity testing. This test has not been FDA cleared or approved. A negative result does not rule out the presence of PCR inhibitors in the specimen or target RNA in concentration below the limit of detection for the assay. If only one viral target is positive but coinfection with multiple targets is suspected, the sample should be re-tested with another FDA cleared, approved, or authorized test, if coinfection would change clinical management. This test was validated by the [facility] [employer]. These laboratories are certified under the Clinical Laboratory Improvement Amendments of 1988 (CLIA-88) as qualified to perform high complexity laboratory testing.       ED MEDICATIONS:   Medications   HYDROmorphone (DILAUDID) injection 1 mg (1 mg Intravenous $Given 11/13/24 1540)   ketorolac (TORADOL) injection 30 mg (30 mg Intravenous $Given 11/13/24 1540)   ondansetron (ZOFRAN) injection 8 mg (8 mg Intravenous $Given 11/13/24 1540)   lactated ringers BOLUS 1,000 mL (0 mLs Intravenous Stopped 11/13/24 1656)   HYDROmorphone  (DILAUDID) injection 1 mg (1 mg Intravenous $Given 11/13/24 8182)         Impression:    ICD-10-CM    1. Sickle cell pain crisis (H)  D57.00           Plan:    Pending studies include none.      This is a 24-year-old female with history of sickle cell disease presenting with concern for a sickle pain crisis.  Labs have been obtained and showed stable anemia, elevated reticulocyte count.  Her chest x-ray was negative for acute consolidation or other process.  The patient received pain medication per her care plan.  On reevaluation patient reported significant improvement in pain, felt well.  She was stable for discharge.  Return precautions were discussed and all questions answered.    MD Alvarez Orona Michael, MD  11/13/24 5418

## 2024-11-15 ENCOUNTER — OFFICE VISIT (OUTPATIENT)
Dept: PULMONOLOGY | Facility: CLINIC | Age: 24
End: 2024-11-15
Attending: STUDENT IN AN ORGANIZED HEALTH CARE EDUCATION/TRAINING PROGRAM
Payer: COMMERCIAL

## 2024-11-15 ENCOUNTER — HOSPITAL ENCOUNTER (EMERGENCY)
Facility: CLINIC | Age: 24
Discharge: HOME | End: 2024-11-16
Attending: EMERGENCY MEDICINE
Payer: COMMERCIAL

## 2024-11-15 VITALS
OXYGEN SATURATION: 97 % | SYSTOLIC BLOOD PRESSURE: 113 MMHG | HEART RATE: 93 BPM | WEIGHT: 160 LBS | RESPIRATION RATE: 16 BRPM | DIASTOLIC BLOOD PRESSURE: 62 MMHG | BODY MASS INDEX: 27.46 KG/M2 | TEMPERATURE: 97.5 F

## 2024-11-15 VITALS
WEIGHT: 165 LBS | HEART RATE: 97 BPM | DIASTOLIC BLOOD PRESSURE: 72 MMHG | RESPIRATION RATE: 16 BRPM | TEMPERATURE: 98.3 F | OXYGEN SATURATION: 94 % | HEIGHT: 64 IN | SYSTOLIC BLOOD PRESSURE: 118 MMHG | BODY MASS INDEX: 28.17 KG/M2

## 2024-11-15 VITALS
WEIGHT: 165.7 LBS | BODY MASS INDEX: 28.44 KG/M2 | DIASTOLIC BLOOD PRESSURE: 82 MMHG | SYSTOLIC BLOOD PRESSURE: 131 MMHG | HEART RATE: 99 BPM | OXYGEN SATURATION: 92 %

## 2024-11-15 DIAGNOSIS — R06.02 SHORTNESS OF BREATH: ICD-10-CM

## 2024-11-15 DIAGNOSIS — J45.40 MODERATE PERSISTENT ASTHMA, UNSPECIFIED WHETHER COMPLICATED: Primary | ICD-10-CM

## 2024-11-15 DIAGNOSIS — D57.00 SICKLE CELL PAIN CRISIS (H): ICD-10-CM

## 2024-11-15 LAB
ALBUMIN SERPL BCG-MCNC: 4.3 G/DL (ref 3.5–5.2)
ALBUMIN UR-MCNC: NEGATIVE MG/DL
ALP SERPL-CCNC: 61 U/L (ref 40–150)
ALT SERPL W P-5'-P-CCNC: 33 U/L (ref 0–50)
ANION GAP SERPL CALCULATED.3IONS-SCNC: 11 MMOL/L (ref 7–15)
APPEARANCE UR: CLEAR
AST SERPL W P-5'-P-CCNC: 40 U/L (ref 0–45)
BASOPHILS # BLD AUTO: 0.2 10E3/UL (ref 0–0.2)
BASOPHILS NFR BLD AUTO: 1 %
BILIRUB SERPL-MCNC: 2.1 MG/DL
BILIRUB UR QL STRIP: NEGATIVE
BUN SERPL-MCNC: 7.2 MG/DL (ref 6–20)
CALCIUM SERPL-MCNC: 8.7 MG/DL (ref 8.8–10.4)
CHLORIDE SERPL-SCNC: 106 MMOL/L (ref 98–107)
COLOR UR AUTO: ABNORMAL
CREAT SERPL-MCNC: 0.54 MG/DL (ref 0.51–0.95)
EGFRCR SERPLBLD CKD-EPI 2021: >90 ML/MIN/1.73M2
EOSINOPHIL # BLD AUTO: 0.5 10E3/UL (ref 0–0.7)
EOSINOPHIL NFR BLD AUTO: 4 %
ERYTHROCYTE [DISTWIDTH] IN BLOOD BY AUTOMATED COUNT: 23.9 % (ref 10–15)
GLUCOSE SERPL-MCNC: 95 MG/DL (ref 70–99)
GLUCOSE UR STRIP-MCNC: NEGATIVE MG/DL
HCO3 SERPL-SCNC: 23 MMOL/L (ref 22–29)
HCT VFR BLD AUTO: 19.4 % (ref 35–47)
HGB BLD-MCNC: 7 G/DL (ref 11.7–15.7)
HGB UR QL STRIP: NEGATIVE
IMM GRANULOCYTES # BLD: 0 10E3/UL
IMM GRANULOCYTES NFR BLD: 0 %
KETONES UR STRIP-MCNC: NEGATIVE MG/DL
LEUKOCYTE ESTERASE UR QL STRIP: NEGATIVE
LIPASE SERPL-CCNC: 31 U/L (ref 13–60)
LYMPHOCYTES # BLD AUTO: 2.1 10E3/UL (ref 0.8–5.3)
LYMPHOCYTES NFR BLD AUTO: 18 %
MCH RBC QN AUTO: 30.3 PG (ref 26.5–33)
MCHC RBC AUTO-ENTMCNC: 36.1 G/DL (ref 31.5–36.5)
MCV RBC AUTO: 84 FL (ref 78–100)
MONOCYTES # BLD AUTO: 0.9 10E3/UL (ref 0–1.3)
MONOCYTES NFR BLD AUTO: 8 %
MUCOUS THREADS #/AREA URNS LPF: PRESENT /LPF
NEUTROPHILS # BLD AUTO: 7.9 10E3/UL (ref 1.6–8.3)
NEUTROPHILS NFR BLD AUTO: 68 %
NITRATE UR QL: NEGATIVE
NRBC # BLD AUTO: 0.2 10E3/UL
NRBC BLD AUTO-RTO: 1 /100
PH UR STRIP: 6.5 [PH] (ref 5–7)
PLATELET # BLD AUTO: 408 10E3/UL (ref 150–450)
POTASSIUM SERPL-SCNC: 4.1 MMOL/L (ref 3.4–5.3)
PROT SERPL-MCNC: 7.1 G/DL (ref 6.4–8.3)
RBC # BLD AUTO: 2.31 10E6/UL (ref 3.8–5.2)
RBC URINE: 0 /HPF
RETICS # AUTO: NORMAL 10*3/UL
RETICS/RBC NFR AUTO: NORMAL %
SODIUM SERPL-SCNC: 140 MMOL/L (ref 135–145)
SP GR UR STRIP: 1.01 (ref 1–1.03)
SQUAMOUS EPITHELIAL: 1 /HPF
UROBILINOGEN UR STRIP-MCNC: 2 MG/DL
WBC # BLD AUTO: 11.6 10E3/UL (ref 4–11)
WBC URINE: 1 /HPF

## 2024-11-15 PROCEDURE — 85025 COMPLETE CBC W/AUTO DIFF WBC: CPT | Performed by: PHYSICIAN ASSISTANT

## 2024-11-15 PROCEDURE — 96374 THER/PROPH/DIAG INJ IV PUSH: CPT | Performed by: EMERGENCY MEDICINE

## 2024-11-15 PROCEDURE — 96361 HYDRATE IV INFUSION ADD-ON: CPT | Performed by: EMERGENCY MEDICINE

## 2024-11-15 PROCEDURE — 36415 COLL VENOUS BLD VENIPUNCTURE: CPT | Performed by: PHYSICIAN ASSISTANT

## 2024-11-15 PROCEDURE — 81001 URINALYSIS AUTO W/SCOPE: CPT | Performed by: PHYSICIAN ASSISTANT

## 2024-11-15 PROCEDURE — G0463 HOSPITAL OUTPT CLINIC VISIT: HCPCS | Performed by: STUDENT IN AN ORGANIZED HEALTH CARE EDUCATION/TRAINING PROGRAM

## 2024-11-15 PROCEDURE — 85045 AUTOMATED RETICULOCYTE COUNT: CPT | Performed by: PHYSICIAN ASSISTANT

## 2024-11-15 PROCEDURE — 80053 COMPREHEN METABOLIC PANEL: CPT | Performed by: PHYSICIAN ASSISTANT

## 2024-11-15 PROCEDURE — 85014 HEMATOCRIT: CPT | Performed by: PHYSICIAN ASSISTANT

## 2024-11-15 PROCEDURE — 96375 TX/PRO/DX INJ NEW DRUG ADDON: CPT | Performed by: EMERGENCY MEDICINE

## 2024-11-15 PROCEDURE — 96376 TX/PRO/DX INJ SAME DRUG ADON: CPT | Performed by: EMERGENCY MEDICINE

## 2024-11-15 PROCEDURE — 250N000011 HC RX IP 250 OP 636: Performed by: EMERGENCY MEDICINE

## 2024-11-15 PROCEDURE — 99284 EMERGENCY DEPT VISIT MOD MDM: CPT | Mod: 25 | Performed by: EMERGENCY MEDICINE

## 2024-11-15 PROCEDURE — 99213 OFFICE O/P EST LOW 20 MIN: CPT | Mod: GC | Performed by: STUDENT IN AN ORGANIZED HEALTH CARE EDUCATION/TRAINING PROGRAM

## 2024-11-15 PROCEDURE — 99284 EMERGENCY DEPT VISIT MOD MDM: CPT | Performed by: EMERGENCY MEDICINE

## 2024-11-15 PROCEDURE — 250N000011 HC RX IP 250 OP 636: Performed by: PHYSICIAN ASSISTANT

## 2024-11-15 PROCEDURE — 258N000003 HC RX IP 258 OP 636: Performed by: PHYSICIAN ASSISTANT

## 2024-11-15 PROCEDURE — 83690 ASSAY OF LIPASE: CPT | Performed by: PHYSICIAN ASSISTANT

## 2024-11-15 RX ORDER — ONDANSETRON 2 MG/ML
8 INJECTION INTRAMUSCULAR; INTRAVENOUS ONCE
Status: COMPLETED | OUTPATIENT
Start: 2024-11-15 | End: 2024-11-15

## 2024-11-15 RX ORDER — SODIUM CHLORIDE, SODIUM LACTATE, POTASSIUM CHLORIDE, CALCIUM CHLORIDE 600; 310; 30; 20 MG/100ML; MG/100ML; MG/100ML; MG/100ML
INJECTION, SOLUTION INTRAVENOUS CONTINUOUS
Status: DISCONTINUED | OUTPATIENT
Start: 2024-11-15 | End: 2024-11-16 | Stop reason: HOSPADM

## 2024-11-15 RX ORDER — HEPARIN SODIUM (PORCINE) LOCK FLUSH IV SOLN 100 UNIT/ML 100 UNIT/ML
5-10 SOLUTION INTRAVENOUS
Status: DISCONTINUED | OUTPATIENT
Start: 2024-11-15 | End: 2024-11-16 | Stop reason: HOSPADM

## 2024-11-15 RX ORDER — KETOROLAC TROMETHAMINE 30 MG/ML
30 INJECTION, SOLUTION INTRAMUSCULAR; INTRAVENOUS ONCE
Status: COMPLETED | OUTPATIENT
Start: 2024-11-15 | End: 2024-11-15

## 2024-11-15 RX ORDER — HEPARIN SODIUM (PORCINE) LOCK FLUSH IV SOLN 100 UNIT/ML 100 UNIT/ML
5 SOLUTION INTRAVENOUS ONCE
Status: COMPLETED | OUTPATIENT
Start: 2024-11-15 | End: 2024-11-15

## 2024-11-15 RX ADMIN — ONDANSETRON 8 MG: 2 INJECTION INTRAMUSCULAR; INTRAVENOUS at 20:02

## 2024-11-15 RX ADMIN — HYDROMORPHONE HYDROCHLORIDE 1 MG: 1 INJECTION, SOLUTION INTRAMUSCULAR; INTRAVENOUS; SUBCUTANEOUS at 21:03

## 2024-11-15 RX ADMIN — SODIUM CHLORIDE, POTASSIUM CHLORIDE, SODIUM LACTATE AND CALCIUM CHLORIDE: 600; 310; 30; 20 INJECTION, SOLUTION INTRAVENOUS at 20:01

## 2024-11-15 RX ADMIN — HEPARIN 5 ML: 100 SYRINGE at 00:24

## 2024-11-15 RX ADMIN — HEPARIN 5 ML: 100 SYRINGE at 23:59

## 2024-11-15 RX ADMIN — KETOROLAC TROMETHAMINE 30 MG: 30 INJECTION, SOLUTION INTRAMUSCULAR at 20:02

## 2024-11-15 RX ADMIN — HYDROMORPHONE HYDROCHLORIDE 1 MG: 1 INJECTION, SOLUTION INTRAMUSCULAR; INTRAVENOUS; SUBCUTANEOUS at 22:10

## 2024-11-15 RX ADMIN — HYDROMORPHONE HYDROCHLORIDE 1 MG: 1 INJECTION, SOLUTION INTRAMUSCULAR; INTRAVENOUS; SUBCUTANEOUS at 20:01

## 2024-11-15 ASSESSMENT — ACTIVITIES OF DAILY LIVING (ADL)
ADLS_ACUITY_SCORE: 0

## 2024-11-15 ASSESSMENT — PAIN SCALES - GENERAL: PAINLEVEL_OUTOF10: NO PAIN (0)

## 2024-11-15 NOTE — ED PROVIDER NOTES
ED Provider Note  [facility]      History     Chief Complaint   Patient presents with    Sickle Cell Pain Crisis     Pain is worse than usual in left arm. Pain going on all day today. Took home meds     HPI  Jennifer Cervantes is a 24 year old female who with a history of sickle cell disease who presents with left arm pain.  This started today with no known precipitating factors.  This has been worsening throughout the day.  No recent illness or known causes of this.  This does feel similar to pain crises she has had.  No other symptoms noted.    Past Medical History  Past Medical History:   Diagnosis Date    Anxiety     Bleeding disorder (H)     Blood clotting disorder (H)     Cerebral infarction (H) 2015    Cognitive developmental delay     low IQ. 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 side (H)     right hand contractures    Iron overload due to repeated red blood cell transfusions     Migraines     Multiple subsegmental pulmonary emboli without acute cor pulmonale (H) 02/01/2021    Oppositional defiant behavior     Presence of intrauterine contraceptive device 2/18/2020    Superficial venous thrombosis of arm, right 03/25/2021    Uncomplicated asthma      Past Surgical History:   Procedure Laterality Date    AS INSERT TUNNELED CV 2 CATH W/O PORT/PUMP      CHOLECYSTECTOMY      CV RIGHT HEART CATH MEASUREMENTS RECORDED N/A 11/18/2021    Procedure: Right Heart Cath;  Surgeon: Jackson Stauffer MD;  Location:  HEART CARDIAC CATH LAB    INSERT PORT VASCULAR ACCESS Left 4/21/2021    Procedure: INSERTION, VASCULAR ACCESS PORT (NOT SURE ON SIDE UNTIL REMOVAL);  Surgeon: Rajan More MD;  Location: [facility] [place] [place] OR    IR CHEST PORT PLACEMENT > 5 YRS OF AGE  4/21/2021    IR CVC NON TUNNEL LINE REMOVAL  5/6/2021    IR CVC NON TUNNEL PLACEMENT > 5 YRS  04/07/2020    IR CVC NON  TUNNEL PLACEMENT > 5 YRS  4/30/2021    IR CVC NON TUNNEL PLACEMENT > 5 YRS  9/7/2022    IR PORT REMOVAL LEFT  4/21/2021    REMOVE PORT VASCULAR ACCESS Left 4/21/2021    Procedure: REMOVAL, VASCULAR ACCESS PORT LEFT;  Surgeon: Rajan More MD;  Location: [facility] OR    REPAIR TENDON ELBOW Right 10/02/2019    Procedure: Right Elbow Flexor Lengthening, Flexor Pronator Slide Of Wrist and Finger, Thumb Adductor Lengthening;  Surgeon: Anai Franco MD;  Location: [facility] OR    TONSILLECTOMY Bilateral 10/02/2019    Procedure: Bilateral Tonsillectomy;  Surgeon: Farhana Guy MD;  Location: [facility] OR    ZZC BREAST REDUCTION (INCLUDES LIPO) TIER 3 Bilateral 04/23/2019     albuterol (PROVENTIL) (2.5 MG/3ML) 0.083% neb solution  aspirin (ASA) 81 MG chewable tablet  budesonide-formoterol (SYMBICORT) 160-4.5 MCG/ACT Inhaler  deferasirox (JADENU) 360 MG tablet  Deferiprone, Twice Daily, 1000 MG TABS  diphenhydrAMINE (BENADRYL) 50 MG capsule  EPINEPHrine (ANY BX GENERIC EQUIV) 0.3 MG/0.3ML injection 2-pack  gabapentin (NEURONTIN) 300 MG capsule  hydroxyurea (HYDREA) 500 MG capsule  ibuprofen (ADVIL/MOTRIN) 800 MG tablet  melatonin 5 MG tablet  methocarbamol (ROBAXIN) 750 MG tablet  methylPREDNISolone (MEDROL) 32 MG tablet  naloxone (NARCAN) 4 MG/0.1ML nasal spray  ondansetron (ZOFRAN ODT) 8 MG ODT tab  oxyCODONE IR (ROXICODONE) 10 MG tablet  pantoprazole (PROTONIX) 40 MG EC tablet      Allergies   Allergen Reactions    Contrast Dye Angioedema     Hives and breathing issues    Fish-Derived Products Hives    Seafood Hives    Adhesive Tape Hives     Primipore dressing causes hives    Gadolinium     Iodinated Contrast Media      Family History  Family History   Problem Relation Age of Onset    Sickle Cell Trait Mother     Hypertension Mother     Asthma Mother     Sickle Cell Trait Father     Glaucoma No family hx of     Macular Degeneration No family hx of     Diabetes No family hx of     Gout No family hx of      Social  History   Social History     Tobacco Use    Smoking status: Never     Passive exposure: Never    Smokeless tobacco: Never   Substance Use Topics    Alcohol use: Not Currently     Alcohol/week: 0.0 standard drinks of alcohol    Drug use: Never      Past medical history, past surgical history, medications, allergies, family history, and social history were reviewed with the patient. No additional pertinent items.   A medically appropriate review of systems was performed with pertinent positives and negatives noted in the HPI, and all other systems negative.    Physical Exam   BP: 128/84  Pulse: 110  Temp: 98  F (36.7  C)  Resp: 16  Weight: 72.6 kg (160 lb)  SpO2: 95 %  Physical Exam  Vitals and nursing note reviewed.   Constitutional:       General: She is not in acute distress.     Appearance: She is well-developed. She is not diaphoretic.   HENT:      Head: Normocephalic and atraumatic.      Mouth/Throat:      Pharynx: No oropharyngeal exudate.   Eyes:      General: No scleral icterus.        Right eye: No discharge.         Left eye: No discharge.      Pupils: Pupils are equal, round, and reactive to light.   Cardiovascular:      Rate and Rhythm: Normal rate and regular rhythm.      Heart sounds: Normal heart sounds. No murmur heard.     No friction rub. No gallop.      Comments: Port in left upper chest.  Pulmonary:      Effort: Pulmonary effort is normal. No respiratory distress.      Breath sounds: Normal breath sounds. No wheezing.   Chest:      Chest wall: No tenderness.   Abdominal:      General: Bowel sounds are normal. There is no distension.      Palpations: Abdomen is soft.      Tenderness: There is no abdominal tenderness.   Musculoskeletal:         General: No tenderness or deformity. Normal range of motion.      Cervical back: Normal range of motion and neck supple.   Skin:     General: Skin is warm and dry.      Coloration: Skin is not pale.      Findings: No erythema or rash.   Neurological:       Mental Status: She is alert and oriented to person, place, and time.      Cranial Nerves: No cranial nerve deficit.           ED Course, Procedures, & Data      Procedures                Results for orders placed or performed during the hospital encounter of 11/14/24   CBC with platelets and differential     Status: Abnormal   Result Value Ref Range    WBC Count 13.3 (H) 4.0 - 11.0 10e3/uL    RBC Count 2.35 (L) 3.80 - 5.20 10e6/uL    Hemoglobin 7.2 (L) 11.7 - 15.7 g/dL    Hematocrit 20.2 (L) 35.0 - 47.0 %    MCV 86 78 - 100 fL    MCH 30.6 26.5 - 33.0 pg    MCHC 35.6 31.5 - 36.5 g/dL    RDW 23.9 (H) 10.0 - 15.0 %    Platelet Count 430 150 - 450 10e3/uL    % Neutrophils 69 %    % Lymphocytes 19 %    % Monocytes 7 %    % Eosinophils 4 %    % Basophils 2 %    % Immature Granulocytes 0 %    NRBCs per 100 WBC 2 (H) <1 /100    Absolute Neutrophils 9.2 (H) 1.6 - 8.3 10e3/uL    Absolute Lymphocytes 2.5 0.8 - 5.3 10e3/uL    Absolute Monocytes 0.9 0.0 - 1.3 10e3/uL    Absolute Eosinophils 0.5 0.0 - 0.7 10e3/uL    Absolute Basophils 0.2 0.0 - 0.2 10e3/uL    Absolute Immature Granulocytes 0.0 <=0.4 10e3/uL    Absolute NRBCs 0.2 10e3/uL   CBC with platelets differential     Status: Abnormal    Narrative    The following orders were created for panel order CBC with platelets differential.  Procedure                               Abnormality         Status                     ---------                               -----------         ------                     CBC with platelets and d...[[id number]]  Abnormal            Final result                 Please view results for these tests on the individual orders.     Medications   HYDROmorphone (DILAUDID) injection 1 mg (1 mg Intravenous $Given 11/14/24 2150)   lactated ringers infusion ( Intravenous $New Bag 11/14/24 2151)   ketorolac (TORADOL) injection 30 mg (30 mg Intravenous $Given 11/14/24 2153)   ondansetron (ZOFRAN) injection 8 mg (8 mg Intravenous $Given 11/14/24 2149)      Labs Ordered and Resulted from Time of ED Arrival to Time of ED Departure   CBC WITH PLATELETS AND DIFFERENTIAL - Abnormal       Result Value    WBC Count 13.3 (*)     RBC Count 2.35 (*)     Hemoglobin 7.2 (*)     Hematocrit 20.2 (*)     MCV 86      MCH 30.6      MCHC 35.6      RDW 23.9 (*)     Platelet Count 430      % Neutrophils 69      % Lymphocytes 19      % Monocytes 7      % Eosinophils 4      % Basophils 2      % Immature Granulocytes 0      NRBCs per 100 WBC 2 (*)     Absolute Neutrophils 9.2 (*)     Absolute Lymphocytes 2.5      Absolute Monocytes 0.9      Absolute Eosinophils 0.5      Absolute Basophils 0.2      Absolute Immature Granulocytes 0.0      Absolute NRBCs 0.2     COMPREHENSIVE METABOLIC PANEL     US Upper Extremity Venous Duplex Left    (Results Pending)              Assessment & Plan    This is a 24-year-old female with a history of sickle cell disease who presents with left arm pain.  This started today with no known precipitating factors.  On exam patient appears uncomfortable.  Lab work shows acute abnormalities.  Ultrasound left upper extremity shows no DVT.  Patient was given hydromorphone, LR, ketorolac, and ondansetron per care plan.  On reevaluation patient is improved.  We will discharge with return precautions.    I have reviewed the nursing notes. I have reviewed the findings, diagnosis, plan and need for follow up with the patient.    New Prescriptions    No medications on file       Final diagnoses:   None       Jitendra Brandon DO  [facility] EMERGENCY DEPARTMENT  11/14/2024     Jitendra Brandon DO  11/15/24 0110

## 2024-11-15 NOTE — ED NOTES
Port decacessed and heparin locked per order. AVS reviewed with patient. All questions answered. Patient discharged home.

## 2024-11-15 NOTE — NURSING NOTE
Chief Complaint   Patient presents with    Follow Up     Return Asthma        Vitals were taken, medications reconciled.    Jomar Evans, Clinic Assistant   3:26 PM

## 2024-11-15 NOTE — ED TRIAGE NOTES
Pain is worse than usual in left arm. Pain going on all day today. Took home meds     Triage Assessment (Adult)       Row Name 11/14/24 2051          Triage Assessment    Airway WDL WDL        Respiratory WDL    Respiratory WDL WDL        Skin Circulation/Temperature WDL    Skin Circulation/Temperature WDL WDL        Cardiac WDL    Cardiac WDL WDL        Peripheral/Neurovascular WDL    Peripheral Neurovascular WDL WDL        Cognitive/Neuro/Behavioral WDL    Cognitive/Neuro/Behavioral WDL mood/behavior     Mood/Behavior sad

## 2024-11-15 NOTE — TELEPHONE ENCOUNTER
Been to ED a couple of times this week.    Doing every single thing she is supposed to do.  More pain than usual; it's tolerable until it's not tolerable.   Sometimes when going to ED they can get it down.  Trying to wean self off oxycodone so she is not going to take extra oxycodone.    Pt had not read Dr. Duncan's response to [facility] message so this writer read it to her.  She voiced understanding; declined offer to send message to Dr. Duncan.    Routed to Care Team.

## 2024-11-15 NOTE — PATIENT INSTRUCTIONS
- Glad your asthma is better controlled, Continue symbicort twice daily and as needed  - Return visit in one year or sooner if needed

## 2024-11-15 NOTE — LETTER
11/15/2024      Jennifer Cervantes  [address]  [facility] [address]      Dear Colleague,    Thank you for referring your patient, Jennifer Cervantes, to the [facility] [place]. Please see a copy of my visit note below.    [facility]  General Pulmonary Clinic-Follow Up VideoVisit  February 9, 2024           Pulm HPI Summary:     Jennifer Cervantes is a 22 year old female with history of  SCA c/b CVA, pain crises, asthma, allergic rhinitis who returns to clinic for asthma management.       Interval Histories:     November 15, 2024  Doing well from a respiratory standpoint. However, she is having multiple pain crises and consider admission vs ED today. She is currently taking he Symbicort BID with minimal PRN use. She has mad minimal cough, wheezing, or nocturnal symptoms. Her ACT score during pharmacy visit is 22.     February 9, 2024  Patient reports asthma feels less controlled in the last month. Reports frequent night time awakenings in the last month, waking up 2-3x/week usually 1x/night with wheezing and chest tightness which improves symptoms but doesn't last long. Has been taking Symbicort 2 puffs BID, doesn't use it as needed. Uses albuterol inhaler as needed 4-5x/day some days of the week. Sometimes symptoms occur spontaneously but other triggers include strong perfumes/odors. No one smoking/vaping, patient denies smoking or vaping. Has chronic dry cough, not changed. Has dyspnea associated with coughing and wheezing episodes, denies dyspnea at rest in absence of these symptoms. Does have occasional ORTEGA with prolonged exertion like long walks. Reports two asthma exacerbations requiring hospitalization in the last year, but denies being prescribed prednisone as her hematologist has recommended avoiding prednisone due to risk of triggering sickle cell complications such as pain crisis. Tested positive  for COVID-19 on (2/5) with symptoms of fatigue, SOB, fevers but patient feels that this is a sign of needing a blood transfusion, and reports that she doesn't feel sick from COVID. No known sick contacts or recent travel.         Medications:     Current Outpatient Medications   Medication Sig Dispense Refill     albuterol (PROVENTIL) (2.5 MG/3ML) 0.083% neb solution Take 2 vials (5 mg) by nebulization every 6 hours as needed for shortness of breath or wheezing. 90 mL 3     aspirin (ASA) 81 MG chewable tablet Take 1 tablet (81 mg) by mouth 2 times daily 60 tablet 11     budesonide-formoterol (SYMBICORT) 160-4.5 MCG/ACT Inhaler Inhale 2 puffs twice daily plus 1-2 puffs as needed. May use up to 12 puffs per day. 20.4 g 11     deferasirox (JADENU) 360 MG tablet Take 4 tablets (1,440 mg) by mouth daily. 120 tablet 11     Deferiprone, Twice Daily, 1000 MG TABS Take 2,000 mg by mouth 2 times daily. 150 tablet 3     diphenhydrAMINE (BENADRYL) 50 MG capsule Administer 12  hours pre - IV contrast injection and 2 hours prior to contrast. Patient must have a . 2 capsule 0     EPINEPHrine (ANY BX GENERIC EQUIV) 0.3 MG/0.3ML injection 2-pack Inject 0.3 mLs (0.3 mg) into the muscle as needed for anaphylaxis. 1 each 1     gabapentin (NEURONTIN) 300 MG capsule Take 1 capsule (300 mg) by mouth 3 times daily. Take PO 1 pill in evening (300 mg) TID to start. If tolerated, increase by 300 mg in morning or lunch every few days, updating your doctor's office in time to get refills. The maximum dose is 900 mg TID. (Patient taking differently: Take 600 mg by mouth at bedtime. Take PO 1 pill in evening (300 mg) TID to start. If tolerated, increase by 300 mg in morning or lunch every few days, updating your doctor's office in time to get refills. The maximum dose is 900 mg TID.) 90 capsule 1     hydroxyurea (HYDREA) 500 MG capsule Take 6 capsules (3,000 mg) by mouth daily 180 capsule 11     ibuprofen (ADVIL/MOTRIN) 800 MG tablet Take  800 mg by mouth every 8 hours as needed for moderate pain       melatonin 5 MG tablet Take 1 tablet (5 mg) by mouth nightly as needed for sleep 30 tablet 1     methocarbamol (ROBAXIN) 750 MG tablet Take 1 tablet (750 mg) by mouth 4 times daily as needed for muscle spasms (during sickle pain crises. Okay to take scheduled while in pain). 60 tablet 1     methylPREDNISolone (MEDROL) 32 MG tablet Take 1 tab 12 hours before appointment and 1 tab 2 hours prior to the procedure with IV contrast. 2 tablet 0     naloxone (NARCAN) 4 MG/0.1ML nasal spray Spray 4 mg into one nostril alternating nostrils as needed for opioid reversal. every 2-3 minutes until assistance arrives 0.2 mL 0     ondansetron (ZOFRAN ODT) 8 MG ODT tab Take 1 tablet (8 mg) by mouth every 8 hours as needed for nausea 40 tablet 4     oxyCODONE IR (ROXICODONE) 10 MG tablet Take 1 tablet (10 mg) by mouth every 6 hours as needed for severe pain or breakthrough pain. Goal 4 per day. Max 6 per day. 10 tablet 0     pantoprazole (PROTONIX) 40 MG EC tablet Take 1 tablet (40 mg) by mouth daily. 30 tablet 0     No current facility-administered medications for this visit.          Allergies:     Allergies   Allergen Reactions     Contrast Dye Angioedema     Hives and breathing issues     Fish-Derived Products Hives     Seafood Hives     Adhesive Tape Hives     Primipore dressing causes hives     Gadolinium      Iodinated Contrast Media             Past Medical and Past Surgical History:     Past Medical History:   Diagnosis Date     Anxiety      Bleeding disorder (H)      Blood clotting disorder (H)      Cerebral infarction (H) 2015     Cognitive developmental delay     low IQ. Please recognize when managing pain and planning with her     Depressive disorder      Hemiplegia and hemiparesis following cerebral infarction affecting right dominant side (H)     right hand contractures     Iron overload due to repeated red blood cell transfusions      Migraines       Multiple subsegmental pulmonary emboli without acute cor pulmonale (H) 02/01/2021     Oppositional defiant behavior      Presence of intrauterine contraceptive device 2/18/2020     Superficial venous thrombosis of arm, right 03/25/2021     Uncomplicated asthma        Past Surgical History:   Procedure Laterality Date     AS INSERT TUNNELED CV 2 CATH W/O PORT/PUMP       CHOLECYSTECTOMY       CV RIGHT HEART CATH MEASUREMENTS RECORDED N/A 11/18/2021    Procedure: Right Heart Cath;  Surgeon: Jackson Stauffer MD;  Location:  HEART CARDIAC CATH LAB     INSERT PORT VASCULAR ACCESS Left 4/21/2021    Procedure: INSERTION, VASCULAR ACCESS PORT (NOT SURE ON SIDE UNTIL REMOVAL);  Surgeon: Rajan More MD;  Location: [facility] OR     IR CHEST PORT PLACEMENT > 5 YRS OF AGE  4/21/2021     IR CVC NON TUNNEL LINE REMOVAL  5/6/2021     IR CVC NON TUNNEL PLACEMENT > 5 YRS  04/07/2020     IR CVC NON TUNNEL PLACEMENT > 5 YRS  4/30/2021     IR CVC NON TUNNEL PLACEMENT > 5 YRS  9/7/2022     IR PORT REMOVAL LEFT  4/21/2021     REMOVE PORT VASCULAR ACCESS Left 4/21/2021    Procedure: REMOVAL, VASCULAR ACCESS PORT LEFT;  Surgeon: Rajan More MD;  Location: [facility] OR     REPAIR TENDON ELBOW Right 10/02/2019    Procedure: Right Elbow Flexor Lengthening, Flexor Pronator Slide Of Wrist and Finger, Thumb Adductor Lengthening;  Surgeon: Anai Franco MD;  Location: [facility] OR     TONSILLECTOMY Bilateral 10/02/2019    Procedure: Bilateral Tonsillectomy;  Surgeon: Farhana Guy MD;  Location: [facility] OR     ZZC BREAST REDUCTION (INCLUDES LIPO) TIER 3 Bilateral 04/23/2019             Social History:     Social History     Socioeconomic History     Marital status: Single     Spouse name: None     Number of children: None     Years of education: None     Highest education level: None   Tobacco Use     Smoking status: Never     Passive exposure: Never     Smokeless tobacco: Never   Substance and Sexual Activity     Alcohol use: Not  "Currently     Alcohol/week: 0.0 standard drinks of alcohol     Drug use: Never     Sexual activity: Not Currently     Partners: Male     Birth control/protection: Other   Social History Narrative    Lives with mom and extended family (mom is her PCA and ILS worker) but \"toxic environment\" per her report. As of 9/28/2022 she is planning to move out at some point soon. She has minimal support at home despite her significant SCD comorbidities and cognitive delay from stroke.     Social Determinants of Health     Interpersonal Safety: Not At Risk (3/10/2023)    Humiliation, Afraid, Rape, and Kick questionnaire      Fear of Current or Ex-Partner: No      Emotionally Abused: No      Physically Abused: No      Sexually Abused: No          Exam:     Video visit       Data:     Labs/PFTs:  Recent Results (from the past week)   Basic metabolic panel    Collection Time: 11/08/24 12:40 PM   Result Value Ref Range    Sodium 136 135 - 145 mmol/L    Potassium 3.9 3.4 - 5.3 mmol/L    Chloride 105 98 - 107 mmol/L    Carbon Dioxide (CO2) 22 22 - 29 mmol/L    Anion Gap 9 7 - 15 mmol/L    Urea Nitrogen 6.2 6.0 - 20.0 mg/dL    Creatinine 0.49 (L) 0.51 - 0.95 mg/dL    GFR Estimate >90 >60 mL/min/1.73m2    Calcium 8.9 8.8 - 10.4 mg/dL    Glucose 90 70 - 99 mg/dL   Reticulocyte count    Collection Time: 11/08/24 12:40 PM   Result Value Ref Range    % Reticulocyte 15.3 (H) 0.5 - 2.0 %    Absolute Reticulocyte 0.404 (H) 0.025 - 0.095 10e6/uL   CBC with platelets and differential    Collection Time: 11/08/24 12:40 PM   Result Value Ref Range    WBC Count 12.1 (H) 4.0 - 11.0 10e3/uL    RBC Count 2.64 (L) 3.80 - 5.20 10e6/uL    Hemoglobin 7.5 (L) 11.7 - 15.7 g/dL    Hematocrit 22.8 (L) 35.0 - 47.0 %    MCV 86 78 - 100 fL    MCH 28.4 26.5 - 33.0 pg    MCHC 32.9 31.5 - 36.5 g/dL    RDW 22.4 (H) 10.0 - 15.0 %    Platelet Count 541 (H) 150 - 450 10e3/uL    % Neutrophils 69 %    % Lymphocytes 17 %    % Monocytes 6 %    % Eosinophils 6 %    % " Basophils 2 %    % Immature Granulocytes 1 %    NRBCs per 100 WBC 1 (H) <1 /100    Absolute Neutrophils 8.3 1.6 - 8.3 10e3/uL    Absolute Lymphocytes 2.1 0.8 - 5.3 10e3/uL    Absolute Monocytes 0.7 0.0 - 1.3 10e3/uL    Absolute Eosinophils 0.7 0.0 - 0.7 10e3/uL    Absolute Basophils 0.3 (H) 0.0 - 0.2 10e3/uL    Absolute Immature Granulocytes 0.1 <=0.4 10e3/uL    Absolute NRBCs 0.1 10e3/uL   Reticulocyte count    Collection Time: 11/09/24  5:13 PM   Result Value Ref Range    % Reticulocyte 15.4 (H) 0.5 - 2.0 %    Absolute Reticulocyte 0.552 (H) 0.025 - 0.095 10e6/uL   Comprehensive metabolic panel    Collection Time: 11/09/24  5:13 PM   Result Value Ref Range    Sodium 139 135 - 145 mmol/L    Potassium 4.3 3.4 - 5.3 mmol/L    Carbon Dioxide (CO2) 24 22 - 29 mmol/L    Anion Gap 10 7 - 15 mmol/L    Urea Nitrogen 6.7 6.0 - 20.0 mg/dL    Creatinine 0.52 0.51 - 0.95 mg/dL    GFR Estimate >90 >60 mL/min/1.73m2    Calcium 8.9 8.8 - 10.4 mg/dL    Chloride 105 98 - 107 mmol/L    Glucose 88 70 - 99 mg/dL    Alkaline Phosphatase 62 40 - 150 U/L    AST 45 0 - 45 U/L    ALT 44 0 - 50 U/L    Protein Total 7.5 6.4 - 8.3 g/dL    Albumin 4.4 3.5 - 5.2 g/dL    Bilirubin Total 1.8 (H) <=1.2 mg/dL   CBC with platelets and differential    Collection Time: 11/09/24  5:13 PM   Result Value Ref Range    WBC Count 11.0 4.0 - 11.0 10e3/uL    RBC Count 2.55 (L) 3.80 - 5.20 10e6/uL    Hemoglobin 7.4 (L) 11.7 - 15.7 g/dL    Hematocrit 22.0 (L) 35.0 - 47.0 %    MCV 86 78 - 100 fL    MCH 29.0 26.5 - 33.0 pg    MCHC 33.6 31.5 - 36.5 g/dL    RDW 21.8 (H) 10.0 - 15.0 %    Platelet Count 493 (H) 150 - 450 10e3/uL    % Neutrophils 71 %    % Lymphocytes 16 %    % Monocytes 7 %    % Eosinophils 4 %    % Basophils 1 %    % Immature Granulocytes 0 %    NRBCs per 100 WBC 1 (H) <1 /100    Absolute Neutrophils 7.8 1.6 - 8.3 10e3/uL    Absolute Lymphocytes 1.8 0.8 - 5.3 10e3/uL    Absolute Monocytes 0.8 0.0 - 1.3 10e3/uL    Absolute Eosinophils 0.5 0.0 - 0.7  10e3/uL    Absolute Basophils 0.2 0.0 - 0.2 10e3/uL    Absolute Immature Granulocytes 0.0 <=0.4 10e3/uL    Absolute NRBCs 0.1 10e3/uL   Basic metabolic panel    Collection Time: 11/12/24  2:33 PM   Result Value Ref Range    Sodium 141 135 - 145 mmol/L    Potassium 3.5 3.4 - 5.3 mmol/L    Chloride 112 (H) 98 - 107 mmol/L    Carbon Dioxide (CO2) 21 (L) 22 - 29 mmol/L    Anion Gap 8 7 - 15 mmol/L    Urea Nitrogen 8.4 6.0 - 20.0 mg/dL    Creatinine 0.51 0.51 - 0.95 mg/dL    GFR Estimate >90 >60 mL/min/1.73m2    Calcium 7.5 (L) 8.8 - 10.4 mg/dL    Glucose 81 70 - 99 mg/dL   Reticulocyte count    Collection Time: 11/12/24  2:33 PM   Result Value Ref Range    % Reticulocyte 17.3 (H) 0.5 - 2.0 %    Absolute Reticulocyte 0.432 (H) 0.025 - 0.095 10e6/uL   CBC with platelets and differential    Collection Time: 11/12/24  2:33 PM   Result Value Ref Range    WBC Count 11.7 (H) 4.0 - 11.0 10e3/uL    RBC Count 2.50 (L) 3.80 - 5.20 10e6/uL    Hemoglobin 7.3 (L) 11.7 - 15.7 g/dL    Hematocrit 21.3 (L) 35.0 - 47.0 %    MCV 85 78 - 100 fL    MCH 29.2 26.5 - 33.0 pg    MCHC 34.3 31.5 - 36.5 g/dL    RDW 22.5 (H) 10.0 - 15.0 %    Platelet Count 417 150 - 450 10e3/uL    % Neutrophils 71 %    % Lymphocytes 14 %    % Monocytes 7 %    % Eosinophils 5 %    % Basophils 2 %    % Immature Granulocytes 0 %    NRBCs per 100 WBC 2 (H) <1 /100    Absolute Neutrophils 8.4 (H) 1.6 - 8.3 10e3/uL    Absolute Lymphocytes 1.7 0.8 - 5.3 10e3/uL    Absolute Monocytes 0.9 0.0 - 1.3 10e3/uL    Absolute Eosinophils 0.6 0.0 - 0.7 10e3/uL    Absolute Basophils 0.2 0.0 - 0.2 10e3/uL    Absolute Immature Granulocytes 0.1 <=0.4 10e3/uL    Absolute NRBCs 0.2 10e3/uL   Hepatic panel    Collection Time: 11/12/24  2:33 PM   Result Value Ref Range    Protein Total 6.4 6.4 - 8.3 g/dL    Albumin 3.7 3.5 - 5.2 g/dL    Bilirubin Total 1.6 (H) <=1.2 mg/dL    Alkaline Phosphatase 55 40 - 150 U/L    AST 37 0 - 45 U/L    ALT 29 0 - 50 U/L    Bilirubin Direct 0.27 0.00 - 0.30  mg/dL   UA with Microscopic reflex to Culture    Collection Time: 11/12/24  4:57 PM    Specimen: Urine, Midstream   Result Value Ref Range    Color Urine Yellow Colorless, Straw, Light Yellow, Yellow    Appearance Urine Clear Clear    Glucose Urine Negative Negative mg/dL    Bilirubin Urine Negative Negative    Ketones Urine Negative Negative mg/dL    Specific Gravity Urine 1.011 1.003 - 1.035    Blood Urine Negative Negative    pH Urine 7.5 (H) 5.0 - 7.0    Protein Albumin Urine Negative Negative mg/dL    Urobilinogen Urine 3.0 (A) Normal, 2.0 mg/dL    Nitrite Urine Negative Negative    Leukocyte Esterase Urine Negative Negative    RBC Urine <1 <=2 /HPF    WBC Urine 2 <=5 /HPF    Squamous Epithelials Urine <1 <=1 /HPF   Urine Culture    Collection Time: 11/12/24  4:57 PM    Specimen: Urine, Midstream   Result Value Ref Range    Culture <10,000 CFU/mL Mixture of Urogenital Heena    HCG qualitative urine    Collection Time: 11/12/24  4:57 PM   Result Value Ref Range    hCG Urine Qualitative Negative Negative   EKG 12-lead, tracing only    Collection Time: 11/13/24  2:53 PM   Result Value Ref Range    Systolic Blood Pressure  mmHg    Diastolic Blood Pressure  mmHg    Ventricular Rate 98 BPM    Atrial Rate 98 BPM    MT Interval 148 ms    QRS Duration 76 ms     ms    QTc 474 ms    P Axis 75 degrees    R AXIS 30 degrees    T Axis 22 degrees    Interpretation ECG       Sinus rhythm  Normal ECG  Unconfirmed report - interpretation of this ECG is computer generated - see medical record for final interpretation  Confirmed by - EMERGENCY ROOM, PHYSICIAN ([id number]),  ELVA CARVER ([id number]) on 11/14/2024 9:45:10 AM     Comprehensive metabolic panel    Collection Time: 11/13/24  3:31 PM   Result Value Ref Range    Sodium 140 135 - 145 mmol/L    Potassium 4.1 3.4 - 5.3 mmol/L    Carbon Dioxide (CO2) 21 (L) 22 - 29 mmol/L    Anion Gap 13 7 - 15 mmol/L    Urea Nitrogen 11.0 6.0 - 20.0 mg/dL    Creatinine 0.53 0.51 - 0.95  mg/dL    GFR Estimate >90 >60 mL/min/1.73m2    Calcium 9.2 8.8 - 10.4 mg/dL    Chloride 106 98 - 107 mmol/L    Glucose 92 70 - 99 mg/dL    Alkaline Phosphatase 65 40 - 150 U/L    AST 38 0 - 45 U/L    ALT 31 0 - 50 U/L    Protein Total 7.8 6.4 - 8.3 g/dL    Albumin 4.5 3.5 - 5.2 g/dL    Bilirubin Total 2.1 (H) <=1.2 mg/dL   Troponin T, High Sensitivity    Collection Time: 11/13/24  3:31 PM   Result Value Ref Range    Troponin T, High Sensitivity <6 <=14 ng/L   Reticulocyte count    Collection Time: 11/13/24  3:31 PM   Result Value Ref Range    % Reticulocyte 19.7 (H) 0.5 - 2.0 %    Absolute Reticulocyte 0.484 (H) 0.025 - 0.095 10e6/uL   CBC with platelets and differential    Collection Time: 11/13/24  3:31 PM   Result Value Ref Range    WBC Count 14.3 (H) 4.0 - 11.0 10e3/uL    RBC Count 2.62 (L) 3.80 - 5.20 10e6/uL    Hemoglobin 7.8 (L) 11.7 - 15.7 g/dL    Hematocrit 22.6 (L) 35.0 - 47.0 %    MCV 86 78 - 100 fL    MCH 29.8 26.5 - 33.0 pg    MCHC 34.5 31.5 - 36.5 g/dL    RDW 23.8 (H) 10.0 - 15.0 %    Platelet Count 452 (H) 150 - 450 10e3/uL    % Neutrophils 73 %    % Lymphocytes 15 %    % Monocytes 7 %    % Eosinophils 4 %    % Basophils 2 %    % Immature Granulocytes 1 %    NRBCs per 100 WBC 2 (H) <1 /100    Absolute Neutrophils 10.5 (H) 1.6 - 8.3 10e3/uL    Absolute Lymphocytes 2.1 0.8 - 5.3 10e3/uL    Absolute Monocytes 0.9 0.0 - 1.3 10e3/uL    Absolute Eosinophils 0.5 0.0 - 0.7 10e3/uL    Absolute Basophils 0.3 (H) 0.0 - 0.2 10e3/uL    Absolute Immature Granulocytes 0.1 <=0.4 10e3/uL    Absolute NRBCs 0.3 10e3/uL   Influenza A/B, RSV, & SARS-CoV2 PCR (COVID-19) Nasopharyngeal    Collection Time: 11/13/24  3:52 PM    Specimen: Nasopharyngeal; Swab   Result Value Ref Range    Influenza A PCR Negative Negative    Influenza B PCR Negative Negative    RSV PCR Negative Negative    SARS CoV2 PCR Negative Negative   Comprehensive metabolic panel    Collection Time: 11/14/24  9:42 PM   Result Value Ref Range    Sodium 142  135 - 145 mmol/L    Potassium 3.7 3.4 - 5.3 mmol/L    Carbon Dioxide (CO2) 25 22 - 29 mmol/L    Anion Gap 10 7 - 15 mmol/L    Urea Nitrogen 9.2 6.0 - 20.0 mg/dL    Creatinine 0.64 0.51 - 0.95 mg/dL    GFR Estimate >90 >60 mL/min/1.73m2    Calcium 8.7 (L) 8.8 - 10.4 mg/dL    Chloride 107 98 - 107 mmol/L    Glucose 96 70 - 99 mg/dL    Alkaline Phosphatase 59 40 - 150 U/L    AST 37 0 - 45 U/L    ALT 28 0 - 50 U/L    Protein Total 7.0 6.4 - 8.3 g/dL    Albumin 4.2 3.5 - 5.2 g/dL    Bilirubin Total 2.0 (H) <=1.2 mg/dL   CBC with platelets and differential    Collection Time: 11/14/24  9:42 PM   Result Value Ref Range    WBC Count 13.3 (H) 4.0 - 11.0 10e3/uL    RBC Count 2.35 (L) 3.80 - 5.20 10e6/uL    Hemoglobin 7.2 (L) 11.7 - 15.7 g/dL    Hematocrit 20.2 (L) 35.0 - 47.0 %    MCV 86 78 - 100 fL    MCH 30.6 26.5 - 33.0 pg    MCHC 35.6 31.5 - 36.5 g/dL    RDW 23.9 (H) 10.0 - 15.0 %    Platelet Count 430 150 - 450 10e3/uL    % Neutrophils 69 %    % Lymphocytes 19 %    % Monocytes 7 %    % Eosinophils 4 %    % Basophils 2 %    % Immature Granulocytes 0 %    NRBCs per 100 WBC 2 (H) <1 /100    Absolute Neutrophils 9.2 (H) 1.6 - 8.3 10e3/uL    Absolute Lymphocytes 2.5 0.8 - 5.3 10e3/uL    Absolute Monocytes 0.9 0.0 - 1.3 10e3/uL    Absolute Eosinophils 0.5 0.0 - 0.7 10e3/uL    Absolute Basophils 0.2 0.0 - 0.2 10e3/uL    Absolute Immature Granulocytes 0.0 <=0.4 10e3/uL    Absolute NRBCs 0.2 10e3/uL         Imaging    EXAM: CT CHEST W/O CONTRAST  LOCATION: [facility]  DATE: 10/17/2024     INDICATION: Cough. Shortness of breath. Right supraclavicular nodule cyst.  COMPARISON:   1. Ultrasound head and neck soft tissue 10/17/2024.  2. Chest x-ray two views 10/17/2024 at 1936 hours.  3. CT soft tissue neck without IV contrast 10/17/2024.  4. CT chest without IV contrast 8/21/2024.  TECHNIQUE: CT chest without IV contrast. Multiplanar reformats were obtained. Dose reduction techniques were  used.  CONTRAST: None.     FINDINGS:   LUNGS AND PLEURA: Tiny right pleural effusion has developed. A few strands of minor platelike atelectasis in the lower lungs.     MEDIASTINUM/AXILLAE: Right supraclavicular nodes (please see separate CT neck soft tissue report). Implanted left chest port, catheter via IJ access, tip at the cavoatrial junction. A few small paratracheal nodes, likely reactive. No suspicious   adenopathy. Normal cardiac size. No pericardial effusion.     CORONARY ARTERY CALCIFICATION: None.     UPPER ABDOMEN: Stomach is distended with residual food material. Cholecystectomy clips.     MUSCULOSKELETAL: Implanted left chest port. Anatomic alignment of the bones and joints.                                                                      IMPRESSION:   1.  Tiny right pleural effusion has developed. A few strands of minor platelike atelectasis in the lower lungs. Small paratracheal nodes, likely reactive.     2.  Right supraclavicular lymph node enlargement (please see separate CT soft tissue neck report). Implanted left chest port, catheter via IJ access, tip at the cavoatrial junction.     3.  Cholecystectomy.         Assessment and Plan:     Pulmonary problem list:  Moderate persistent eosinophilic asthma  Allergic rhinitis  Sickle cell anemia  Recent COVID-19 infection (positive test 2/5/2024)  Mild restriction with trace right pleural effusion    Adequate symptom control with no recent exacerbations, however, continues to have pain crises due to sickle cell. Flu vaccine UTD, reports COVID is also UTD. Recent PFTs show mild restriction related to atelectasis and trace effusion that does not need intervention at this time.    Recommendations:  Continue Symbicort SMART therapy  RTC in 1 year of sooner if pulmonary concerns arise    Patient was seen and plan of care was discussed with attending physician Dr. Baker.    Jason Harvey MD  Pulmonary and Critical Care Fellow      Attestation signed  by Korey Baker MD at 11/18/2024  8:52 AM:  The patient was seen examined by me with the pulmonary fellow/resident, I have personally reviewed the imaging studies, lab and culture results.  The note reflects our joint findings, assessment and plan on 11/15/24.     Korey Baker MD  Pulmonary & Critical Care   [facility]  Securely message with the Vocera Web Console (learn more here)        Again, thank you for allowing me to participate in the care of your patient.        Sincerely,        Jake Harvey MD

## 2024-11-15 NOTE — PROGRESS NOTES
[facility]  [facility]  General Pulmonary Clinic-Follow Up VideoVisit  February 9, 2024           Pulm HPI Summary:     Jennifer Cervantes is a 22 year old female with history of  SCA c/b CVA, pain crises, asthma, allergic rhinitis who returns to clinic for asthma management.       Interval Histories:     November 15, 2024  Doing well from a respiratory standpoint. However, she is having multiple pain crises and consider admission vs ED today. She is currently taking he Symbicort BID with minimal PRN use. She has mad minimal cough, wheezing, or nocturnal symptoms. Her ACT score during pharmacy visit is 22.     February 9, 2024  Patient reports asthma feels less controlled in the last month. Reports frequent night time awakenings in the last month, waking up 2-3x/week usually 1x/night with wheezing and chest tightness which improves symptoms but doesn't last long. Has been taking Symbicort 2 puffs BID, doesn't use it as needed. Uses albuterol inhaler as needed 4-5x/day some days of the week. Sometimes symptoms occur spontaneously but other triggers include strong perfumes/odors. No one smoking/vaping, patient denies smoking or vaping. Has chronic dry cough, not changed. Has dyspnea associated with coughing and wheezing episodes, denies dyspnea at rest in absence of these symptoms. Does have occasional ORTEGA with prolonged exertion like long walks. Reports two asthma exacerbations requiring hospitalization in the last year, but denies being prescribed prednisone as her hematologist has recommended avoiding prednisone due to risk of triggering sickle cell complications such as pain crisis. Tested positive for COVID-19 on (2/5) with symptoms of fatigue, SOB, fevers but patient feels that this is a sign of needing a blood transfusion, and reports that she doesn't feel sick from COVID. No known sick contacts or recent travel.         Medications:     Current Outpatient Medications    Medication Sig Dispense Refill    albuterol (PROVENTIL) (2.5 MG/3ML) 0.083% neb solution Take 2 vials (5 mg) by nebulization every 6 hours as needed for shortness of breath or wheezing. 90 mL 3    aspirin (ASA) 81 MG chewable tablet Take 1 tablet (81 mg) by mouth 2 times daily 60 tablet 11    budesonide-formoterol (SYMBICORT) 160-4.5 MCG/ACT Inhaler Inhale 2 puffs twice daily plus 1-2 puffs as needed. May use up to 12 puffs per day. 20.4 g 11    deferasirox (JADENU) 360 MG tablet Take 4 tablets (1,440 mg) by mouth daily. 120 tablet 11    Deferiprone, Twice Daily, 1000 MG TABS Take 2,000 mg by mouth 2 times daily. 150 tablet 3    diphenhydrAMINE (BENADRYL) 50 MG capsule Administer 12  hours pre - IV contrast injection and 2 hours prior to contrast. Patient must have a . 2 capsule 0    EPINEPHrine (ANY BX GENERIC EQUIV) 0.3 MG/0.3ML injection 2-pack Inject 0.3 mLs (0.3 mg) into the muscle as needed for anaphylaxis. 1 each 1    gabapentin (NEURONTIN) 300 MG capsule Take 1 capsule (300 mg) by mouth 3 times daily. Take PO 1 pill in evening (300 mg) TID to start. If tolerated, increase by 300 mg in morning or lunch every few days, updating your doctor's office in time to get refills. The maximum dose is 900 mg TID. (Patient taking differently: Take 600 mg by mouth at bedtime. Take PO 1 pill in evening (300 mg) TID to start. If tolerated, increase by 300 mg in morning or lunch every few days, updating your doctor's office in time to get refills. The maximum dose is 900 mg TID.) 90 capsule 1    hydroxyurea (HYDREA) 500 MG capsule Take 6 capsules (3,000 mg) by mouth daily 180 capsule 11    ibuprofen (ADVIL/MOTRIN) 800 MG tablet Take 800 mg by mouth every 8 hours as needed for moderate pain      melatonin 5 MG tablet Take 1 tablet (5 mg) by mouth nightly as needed for sleep 30 tablet 1    methocarbamol (ROBAXIN) 750 MG tablet Take 1 tablet (750 mg) by mouth 4 times daily as needed for muscle spasms (during sickle pain  crises. Okay to take scheduled while in pain). 60 tablet 1    methylPREDNISolone (MEDROL) 32 MG tablet Take 1 tab 12 hours before appointment and 1 tab 2 hours prior to the procedure with IV contrast. 2 tablet 0    naloxone (NARCAN) 4 MG/0.1ML nasal spray Spray 4 mg into one nostril alternating nostrils as needed for opioid reversal. every 2-3 minutes until assistance arrives 0.2 mL 0    ondansetron (ZOFRAN ODT) 8 MG ODT tab Take 1 tablet (8 mg) by mouth every 8 hours as needed for nausea 40 tablet 4    oxyCODONE IR (ROXICODONE) 10 MG tablet Take 1 tablet (10 mg) by mouth every 6 hours as needed for severe pain or breakthrough pain. Goal 4 per day. Max 6 per day. 10 tablet 0    pantoprazole (PROTONIX) 40 MG EC tablet Take 1 tablet (40 mg) by mouth daily. 30 tablet 0     No current facility-administered medications for this visit.          Allergies:     Allergies   Allergen Reactions    Contrast Dye Angioedema     Hives and breathing issues    Fish-Derived Products Hives    Seafood Hives    Adhesive Tape Hives     Primipore dressing causes hives    Gadolinium     Iodinated Contrast Media             Past Medical and Past Surgical History:     Past Medical History:   Diagnosis Date    Anxiety     Bleeding disorder (H)     Blood clotting disorder (H)     Cerebral infarction (H) 2015    Cognitive developmental delay     low IQ. 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 side (H)     right hand contractures    Iron overload due to repeated red blood cell transfusions     Migraines     Multiple subsegmental pulmonary emboli without acute cor pulmonale (H) 02/01/2021    Oppositional defiant behavior     Presence of intrauterine contraceptive device 2/18/2020    Superficial venous thrombosis of arm, right 03/25/2021    Uncomplicated asthma        Past Surgical History:   Procedure Laterality Date     "AS INSERT TUNNELED CV 2 CATH W/O PORT/PUMP      CHOLECYSTECTOMY      CV RIGHT HEART CATH MEASUREMENTS RECORDED N/A 11/18/2021    Procedure: Right Heart Cath;  Surgeon: Jackson Stauffer MD;  Location:  HEART CARDIAC CATH LAB    INSERT PORT VASCULAR ACCESS Left 4/21/2021    Procedure: INSERTION, VASCULAR ACCESS PORT (NOT SURE ON SIDE UNTIL REMOVAL);  Surgeon: Rajan More MD;  Location: [facility] OR    IR CHEST PORT PLACEMENT > 5 YRS OF AGE  4/21/2021    IR CVC NON TUNNEL LINE REMOVAL  5/6/2021    IR CVC NON TUNNEL PLACEMENT > 5 YRS  04/07/2020    IR CVC NON TUNNEL PLACEMENT > 5 YRS  4/30/2021    IR CVC NON TUNNEL PLACEMENT > 5 YRS  9/7/2022    IR PORT REMOVAL LEFT  4/21/2021    REMOVE PORT VASCULAR ACCESS Left 4/21/2021    Procedure: REMOVAL, VASCULAR ACCESS PORT LEFT;  Surgeon: Rajan More MD;  Location: [facility] OR    REPAIR TENDON ELBOW Right 10/02/2019    Procedure: Right Elbow Flexor Lengthening, Flexor Pronator Slide Of Wrist and Finger, Thumb Adductor Lengthening;  Surgeon: Anai Franco MD;  Location: [facility] OR    TONSILLECTOMY Bilateral 10/02/2019    Procedure: Bilateral Tonsillectomy;  Surgeon: Farhana Guy MD;  Location: [facility] OR    ZZC BREAST REDUCTION (INCLUDES LIPO) TIER 3 Bilateral 04/23/2019             Social History:     Social History     Socioeconomic History    Marital status: Single     Spouse name: None    Number of children: None    Years of education: None    Highest education level: None   Tobacco Use    Smoking status: Never     Passive exposure: Never    Smokeless tobacco: Never   Substance and Sexual Activity    Alcohol use: Not Currently     Alcohol/week: 0.0 standard drinks of alcohol    Drug use: Never    Sexual activity: Not Currently     Partners: Male     Birth control/protection: Other   Social History Narrative    Lives with mom and extended family (mom is her PCA and ILS worker) but \"toxic environment\" per her report. As of 9/28/2022 she is planning to move out " at some point soon. She has minimal support at home despite her significant SCD comorbidities and cognitive delay from stroke.     Social Determinants of Health     Interpersonal Safety: Not At Risk (3/10/2023)    Humiliation, Afraid, Rape, and Kick questionnaire     Fear of Current or Ex-Partner: No     Emotionally Abused: No     Physically Abused: No     Sexually Abused: No          Exam:     Video visit       Data:     Labs/PFTs:  Recent Results (from the past week)   Basic metabolic panel    Collection Time: 11/08/24 12:40 PM   Result Value Ref Range    Sodium 136 135 - 145 mmol/L    Potassium 3.9 3.4 - 5.3 mmol/L    Chloride 105 98 - 107 mmol/L    Carbon Dioxide (CO2) 22 22 - 29 mmol/L    Anion Gap 9 7 - 15 mmol/L    Urea Nitrogen 6.2 6.0 - 20.0 mg/dL    Creatinine 0.49 (L) 0.51 - 0.95 mg/dL    GFR Estimate >90 >60 mL/min/1.73m2    Calcium 8.9 8.8 - 10.4 mg/dL    Glucose 90 70 - 99 mg/dL   Reticulocyte count    Collection Time: 11/08/24 12:40 PM   Result Value Ref Range    % Reticulocyte 15.3 (H) 0.5 - 2.0 %    Absolute Reticulocyte 0.404 (H) 0.025 - 0.095 10e6/uL   CBC with platelets and differential    Collection Time: 11/08/24 12:40 PM   Result Value Ref Range    WBC Count 12.1 (H) 4.0 - 11.0 10e3/uL    RBC Count 2.64 (L) 3.80 - 5.20 10e6/uL    Hemoglobin 7.5 (L) 11.7 - 15.7 g/dL    Hematocrit 22.8 (L) 35.0 - 47.0 %    MCV 86 78 - 100 fL    MCH 28.4 26.5 - 33.0 pg    MCHC 32.9 31.5 - 36.5 g/dL    RDW 22.4 (H) 10.0 - 15.0 %    Platelet Count 541 (H) 150 - 450 10e3/uL    % Neutrophils 69 %    % Lymphocytes 17 %    % Monocytes 6 %    % Eosinophils 6 %    % Basophils 2 %    % Immature Granulocytes 1 %    NRBCs per 100 WBC 1 (H) <1 /100    Absolute Neutrophils 8.3 1.6 - 8.3 10e3/uL    Absolute Lymphocytes 2.1 0.8 - 5.3 10e3/uL    Absolute Monocytes 0.7 0.0 - 1.3 10e3/uL    Absolute Eosinophils 0.7 0.0 - 0.7 10e3/uL    Absolute Basophils 0.3 (H) 0.0 - 0.2 10e3/uL    Absolute Immature Granulocytes 0.1 <=0.4 10e3/uL     Absolute NRBCs 0.1 10e3/uL   Reticulocyte count    Collection Time: 11/09/24  5:13 PM   Result Value Ref Range    % Reticulocyte 15.4 (H) 0.5 - 2.0 %    Absolute Reticulocyte 0.552 (H) 0.025 - 0.095 10e6/uL   Comprehensive metabolic panel    Collection Time: 11/09/24  5:13 PM   Result Value Ref Range    Sodium 139 135 - 145 mmol/L    Potassium 4.3 3.4 - 5.3 mmol/L    Carbon Dioxide (CO2) 24 22 - 29 mmol/L    Anion Gap 10 7 - 15 mmol/L    Urea Nitrogen 6.7 6.0 - 20.0 mg/dL    Creatinine 0.52 0.51 - 0.95 mg/dL    GFR Estimate >90 >60 mL/min/1.73m2    Calcium 8.9 8.8 - 10.4 mg/dL    Chloride 105 98 - 107 mmol/L    Glucose 88 70 - 99 mg/dL    Alkaline Phosphatase 62 40 - 150 U/L    AST 45 0 - 45 U/L    ALT 44 0 - 50 U/L    Protein Total 7.5 6.4 - 8.3 g/dL    Albumin 4.4 3.5 - 5.2 g/dL    Bilirubin Total 1.8 (H) <=1.2 mg/dL   CBC with platelets and differential    Collection Time: 11/09/24  5:13 PM   Result Value Ref Range    WBC Count 11.0 4.0 - 11.0 10e3/uL    RBC Count 2.55 (L) 3.80 - 5.20 10e6/uL    Hemoglobin 7.4 (L) 11.7 - 15.7 g/dL    Hematocrit 22.0 (L) 35.0 - 47.0 %    MCV 86 78 - 100 fL    MCH 29.0 26.5 - 33.0 pg    MCHC 33.6 31.5 - 36.5 g/dL    RDW 21.8 (H) 10.0 - 15.0 %    Platelet Count 493 (H) 150 - 450 10e3/uL    % Neutrophils 71 %    % Lymphocytes 16 %    % Monocytes 7 %    % Eosinophils 4 %    % Basophils 1 %    % Immature Granulocytes 0 %    NRBCs per 100 WBC 1 (H) <1 /100    Absolute Neutrophils 7.8 1.6 - 8.3 10e3/uL    Absolute Lymphocytes 1.8 0.8 - 5.3 10e3/uL    Absolute Monocytes 0.8 0.0 - 1.3 10e3/uL    Absolute Eosinophils 0.5 0.0 - 0.7 10e3/uL    Absolute Basophils 0.2 0.0 - 0.2 10e3/uL    Absolute Immature Granulocytes 0.0 <=0.4 10e3/uL    Absolute NRBCs 0.1 10e3/uL   Basic metabolic panel    Collection Time: 11/12/24  2:33 PM   Result Value Ref Range    Sodium 141 135 - 145 mmol/L    Potassium 3.5 3.4 - 5.3 mmol/L    Chloride 112 (H) 98 - 107 mmol/L    Carbon Dioxide (CO2) 21 (L) 22 - 29  mmol/L    Anion Gap 8 7 - 15 mmol/L    Urea Nitrogen 8.4 6.0 - 20.0 mg/dL    Creatinine 0.51 0.51 - 0.95 mg/dL    GFR Estimate >90 >60 mL/min/1.73m2    Calcium 7.5 (L) 8.8 - 10.4 mg/dL    Glucose 81 70 - 99 mg/dL   Reticulocyte count    Collection Time: 11/12/24  2:33 PM   Result Value Ref Range    % Reticulocyte 17.3 (H) 0.5 - 2.0 %    Absolute Reticulocyte 0.432 (H) 0.025 - 0.095 10e6/uL   CBC with platelets and differential    Collection Time: 11/12/24  2:33 PM   Result Value Ref Range    WBC Count 11.7 (H) 4.0 - 11.0 10e3/uL    RBC Count 2.50 (L) 3.80 - 5.20 10e6/uL    Hemoglobin 7.3 (L) 11.7 - 15.7 g/dL    Hematocrit 21.3 (L) 35.0 - 47.0 %    MCV 85 78 - 100 fL    MCH 29.2 26.5 - 33.0 pg    MCHC 34.3 31.5 - 36.5 g/dL    RDW 22.5 (H) 10.0 - 15.0 %    Platelet Count 417 150 - 450 10e3/uL    % Neutrophils 71 %    % Lymphocytes 14 %    % Monocytes 7 %    % Eosinophils 5 %    % Basophils 2 %    % Immature Granulocytes 0 %    NRBCs per 100 WBC 2 (H) <1 /100    Absolute Neutrophils 8.4 (H) 1.6 - 8.3 10e3/uL    Absolute Lymphocytes 1.7 0.8 - 5.3 10e3/uL    Absolute Monocytes 0.9 0.0 - 1.3 10e3/uL    Absolute Eosinophils 0.6 0.0 - 0.7 10e3/uL    Absolute Basophils 0.2 0.0 - 0.2 10e3/uL    Absolute Immature Granulocytes 0.1 <=0.4 10e3/uL    Absolute NRBCs 0.2 10e3/uL   Hepatic panel    Collection Time: 11/12/24  2:33 PM   Result Value Ref Range    Protein Total 6.4 6.4 - 8.3 g/dL    Albumin 3.7 3.5 - 5.2 g/dL    Bilirubin Total 1.6 (H) <=1.2 mg/dL    Alkaline Phosphatase 55 40 - 150 U/L    AST 37 0 - 45 U/L    ALT 29 0 - 50 U/L    Bilirubin Direct 0.27 0.00 - 0.30 mg/dL   UA with Microscopic reflex to Culture    Collection Time: 11/12/24  4:57 PM    Specimen: Urine, Midstream   Result Value Ref Range    Color Urine Yellow Colorless, Straw, Light Yellow, Yellow    Appearance Urine Clear Clear    Glucose Urine Negative Negative mg/dL    Bilirubin Urine Negative Negative    Ketones Urine Negative Negative mg/dL    Specific  Gravity Urine 1.011 1.003 - 1.035    Blood Urine Negative Negative    pH Urine 7.5 (H) 5.0 - 7.0    Protein Albumin Urine Negative Negative mg/dL    Urobilinogen Urine 3.0 (A) Normal, 2.0 mg/dL    Nitrite Urine Negative Negative    Leukocyte Esterase Urine Negative Negative    RBC Urine <1 <=2 /HPF    WBC Urine 2 <=5 /HPF    Squamous Epithelials Urine <1 <=1 /HPF   Urine Culture    Collection Time: 11/12/24  4:57 PM    Specimen: Urine, Midstream   Result Value Ref Range    Culture <10,000 CFU/mL Mixture of Urogenital Heena    HCG qualitative urine    Collection Time: 11/12/24  4:57 PM   Result Value Ref Range    hCG Urine Qualitative Negative Negative   EKG 12-lead, tracing only    Collection Time: 11/13/24  2:53 PM   Result Value Ref Range    Systolic Blood Pressure  mmHg    Diastolic Blood Pressure  mmHg    Ventricular Rate 98 BPM    Atrial Rate 98 BPM    MT Interval 148 ms    QRS Duration 76 ms     ms    QTc 474 ms    P Axis 75 degrees    R AXIS 30 degrees    T Axis 22 degrees    Interpretation ECG       Sinus rhythm  Normal ECG  Unconfirmed report - interpretation of this ECG is computer generated - see medical record for final interpretation  Confirmed by - EMERGENCY ROOM, PHYSICIAN ([id number]),  ELVA CARVER ([id number]) on 11/14/2024 9:45:10 AM     Comprehensive metabolic panel    Collection Time: 11/13/24  3:31 PM   Result Value Ref Range    Sodium 140 135 - 145 mmol/L    Potassium 4.1 3.4 - 5.3 mmol/L    Carbon Dioxide (CO2) 21 (L) 22 - 29 mmol/L    Anion Gap 13 7 - 15 mmol/L    Urea Nitrogen 11.0 6.0 - 20.0 mg/dL    Creatinine 0.53 0.51 - 0.95 mg/dL    GFR Estimate >90 >60 mL/min/1.73m2    Calcium 9.2 8.8 - 10.4 mg/dL    Chloride 106 98 - 107 mmol/L    Glucose 92 70 - 99 mg/dL    Alkaline Phosphatase 65 40 - 150 U/L    AST 38 0 - 45 U/L    ALT 31 0 - 50 U/L    Protein Total 7.8 6.4 - 8.3 g/dL    Albumin 4.5 3.5 - 5.2 g/dL    Bilirubin Total 2.1 (H) <=1.2 mg/dL   Troponin T, High Sensitivity     Collection Time: 11/13/24  3:31 PM   Result Value Ref Range    Troponin T, High Sensitivity <6 <=14 ng/L   Reticulocyte count    Collection Time: 11/13/24  3:31 PM   Result Value Ref Range    % Reticulocyte 19.7 (H) 0.5 - 2.0 %    Absolute Reticulocyte 0.484 (H) 0.025 - 0.095 10e6/uL   CBC with platelets and differential    Collection Time: 11/13/24  3:31 PM   Result Value Ref Range    WBC Count 14.3 (H) 4.0 - 11.0 10e3/uL    RBC Count 2.62 (L) 3.80 - 5.20 10e6/uL    Hemoglobin 7.8 (L) 11.7 - 15.7 g/dL    Hematocrit 22.6 (L) 35.0 - 47.0 %    MCV 86 78 - 100 fL    MCH 29.8 26.5 - 33.0 pg    MCHC 34.5 31.5 - 36.5 g/dL    RDW 23.8 (H) 10.0 - 15.0 %    Platelet Count 452 (H) 150 - 450 10e3/uL    % Neutrophils 73 %    % Lymphocytes 15 %    % Monocytes 7 %    % Eosinophils 4 %    % Basophils 2 %    % Immature Granulocytes 1 %    NRBCs per 100 WBC 2 (H) <1 /100    Absolute Neutrophils 10.5 (H) 1.6 - 8.3 10e3/uL    Absolute Lymphocytes 2.1 0.8 - 5.3 10e3/uL    Absolute Monocytes 0.9 0.0 - 1.3 10e3/uL    Absolute Eosinophils 0.5 0.0 - 0.7 10e3/uL    Absolute Basophils 0.3 (H) 0.0 - 0.2 10e3/uL    Absolute Immature Granulocytes 0.1 <=0.4 10e3/uL    Absolute NRBCs 0.3 10e3/uL   Influenza A/B, RSV, & SARS-CoV2 PCR (COVID-19) Nasopharyngeal    Collection Time: 11/13/24  3:52 PM    Specimen: Nasopharyngeal; Swab   Result Value Ref Range    Influenza A PCR Negative Negative    Influenza B PCR Negative Negative    RSV PCR Negative Negative    SARS CoV2 PCR Negative Negative   Comprehensive metabolic panel    Collection Time: 11/14/24  9:42 PM   Result Value Ref Range    Sodium 142 135 - 145 mmol/L    Potassium 3.7 3.4 - 5.3 mmol/L    Carbon Dioxide (CO2) 25 22 - 29 mmol/L    Anion Gap 10 7 - 15 mmol/L    Urea Nitrogen 9.2 6.0 - 20.0 mg/dL    Creatinine 0.64 0.51 - 0.95 mg/dL    GFR Estimate >90 >60 mL/min/1.73m2    Calcium 8.7 (L) 8.8 - 10.4 mg/dL    Chloride 107 98 - 107 mmol/L    Glucose 96 70 - 99 mg/dL    Alkaline Phosphatase  59 40 - 150 U/L    AST 37 0 - 45 U/L    ALT 28 0 - 50 U/L    Protein Total 7.0 6.4 - 8.3 g/dL    Albumin 4.2 3.5 - 5.2 g/dL    Bilirubin Total 2.0 (H) <=1.2 mg/dL   CBC with platelets and differential    Collection Time: 11/14/24  9:42 PM   Result Value Ref Range    WBC Count 13.3 (H) 4.0 - 11.0 10e3/uL    RBC Count 2.35 (L) 3.80 - 5.20 10e6/uL    Hemoglobin 7.2 (L) 11.7 - 15.7 g/dL    Hematocrit 20.2 (L) 35.0 - 47.0 %    MCV 86 78 - 100 fL    MCH 30.6 26.5 - 33.0 pg    MCHC 35.6 31.5 - 36.5 g/dL    RDW 23.9 (H) 10.0 - 15.0 %    Platelet Count 430 150 - 450 10e3/uL    % Neutrophils 69 %    % Lymphocytes 19 %    % Monocytes 7 %    % Eosinophils 4 %    % Basophils 2 %    % Immature Granulocytes 0 %    NRBCs per 100 WBC 2 (H) <1 /100    Absolute Neutrophils 9.2 (H) 1.6 - 8.3 10e3/uL    Absolute Lymphocytes 2.5 0.8 - 5.3 10e3/uL    Absolute Monocytes 0.9 0.0 - 1.3 10e3/uL    Absolute Eosinophils 0.5 0.0 - 0.7 10e3/uL    Absolute Basophils 0.2 0.0 - 0.2 10e3/uL    Absolute Immature Granulocytes 0.0 <=0.4 10e3/uL    Absolute NRBCs 0.2 10e3/uL         Imaging    EXAM: CT CHEST W/O CONTRAST  LOCATION: [facility]  DATE: 10/17/2024     INDICATION: Cough. Shortness of breath. Right supraclavicular nodule cyst.  COMPARISON:   1. Ultrasound head and neck soft tissue 10/17/2024.  2. Chest x-ray two views 10/17/2024 at 1936 hours.  3. CT soft tissue neck without IV contrast 10/17/2024.  4. CT chest without IV contrast 8/21/2024.  TECHNIQUE: CT chest without IV contrast. Multiplanar reformats were obtained. Dose reduction techniques were used.  CONTRAST: None.     FINDINGS:   LUNGS AND PLEURA: Tiny right pleural effusion has developed. A few strands of minor platelike atelectasis in the lower lungs.     MEDIASTINUM/AXILLAE: Right supraclavicular nodes (please see separate CT neck soft tissue report). Implanted left chest port, catheter via IJ access, tip at the cavoatrial junction. A  few small paratracheal nodes, likely reactive. No suspicious   adenopathy. Normal cardiac size. No pericardial effusion.     CORONARY ARTERY CALCIFICATION: None.     UPPER ABDOMEN: Stomach is distended with residual food material. Cholecystectomy clips.     MUSCULOSKELETAL: Implanted left chest port. Anatomic alignment of the bones and joints.                                                                      IMPRESSION:   1.  Tiny right pleural effusion has developed. A few strands of minor platelike atelectasis in the lower lungs. Small paratracheal nodes, likely reactive.     2.  Right supraclavicular lymph node enlargement (please see separate CT soft tissue neck report). Implanted left chest port, catheter via IJ access, tip at the cavoatrial junction.     3.  Cholecystectomy.         Assessment and Plan:     Pulmonary problem list:  Moderate persistent eosinophilic asthma  Allergic rhinitis  Sickle cell anemia  Recent COVID-19 infection (positive test 2/5/2024)  Mild restriction with trace right pleural effusion    Adequate symptom control with no recent exacerbations, however, continues to have pain crises due to sickle cell. Flu vaccine UTD, reports COVID is also UTD. Recent PFTs show mild restriction related to atelectasis and trace effusion that does not need intervention at this time.    Recommendations:  Continue Symbicort SMART therapy  RTC in 1 year of sooner if pulmonary concerns arise    Patient was seen and plan of care was discussed with attending physician Dr. Baker.    Jason Harvey MD  Pulmonary and Critical Care Fellow

## 2024-11-15 NOTE — ED NOTES
Bed: [facility]  Expected date:   Expected time:   Means of arrival:   Comments:  T,D - waiting room

## 2024-11-16 NOTE — DISCHARGE INSTRUCTIONS
Here in the emergency room you had reassuring laboratory studies.  You had improvement of symptoms with pain medications per your care plan.  We discussed it is safe at this time for you to discharge.  Is important that you follow closely with your hematology team I did place a referral and they should be calling you.  In addition we discussed if you have prescribed oxycodone as given to you by your team be certain they are able to take this as needed for sickle cell pain, taking this medication can prevent emergency department visits for pain.    If you develop any new or worsening symptoms, is important to return right away to the emergency department for further evaluation and management.

## 2024-11-16 NOTE — ED TRIAGE NOTES
Pt reports leg and abd pain.     Triage Assessment (Adult)       Row Name 11/15/24 1910          Triage Assessment    Airway WDL WDL        Respiratory WDL    Respiratory WDL WDL        Skin Circulation/Temperature WDL    Skin Circulation/Temperature WDL WDL        Cardiac WDL    Cardiac WDL WDL        Peripheral/Neurovascular WDL    Peripheral Neurovascular WDL WDL        Cognitive/Neuro/Behavioral WDL    Cognitive/Neuro/Behavioral WDL WDL                     
never used

## 2024-11-16 NOTE — ED PROVIDER NOTES
ED Provider Note  [facility]      History     Chief Complaint   Patient presents with    Sickle Cell Pain Crisis    Abdominal Pain     HPI  Jennifer Cervantes is a 24 year old female past medical history significant for oppositional defiant behavior, history of sickle cell disease with crisis, history of PE who presents the emergency department with concerns for bilateral leg pain and abdominal discomfort.  Patient presents alone.    Per chart review patient seen in the emergency department 11/12/2024 with concerns for left upper extremity and left lower extremity pain.  She underwent reassuring workup was diagnosed with sickle cell pain crisis, ultimately felt well enough to go home.  She subsequently returned back to the emergency department 11/13/2024 with concerns for sickle cell pain crisis as she was unable to get into the infusion center.  She again had reassuring evaluation was given medications per care plan and discharge.  Patient also seen yesterday in the ED with concerns for sickle cell pain crisis with left arm pain.  She underwent negative left upper extremity ultrasound and had improvement of symptoms with her care plan prior to discharge.    Today patient continues to have pain in her bilateral legs mainly over the anterior thighs.  She also reports chronic upper abdominal discomfort which has not been ongoing for several months.  Symptoms without any associated fevers cough dyspnea chest pain vomiting diarrhea urinary symptoms concern for pregnancy.  No back pain or injury.  She denies any rashes to her legs.  Patient takes oxycodone at home for her sickle cell pain, notes she is trying to wean herself down has been taking 10 mg oxy twice daily as she is hoping to get off of the oxycodone next week.  She feels her symptoms are consistent with prior sickle cell pain flares.    Past Medical History  Past Medical History:   Diagnosis Date    Anxiety     Bleeding disorder (H)      Blood clotting disorder (H)     Cerebral infarction (H) 2015    Cognitive developmental delay     low IQ. 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 side (H)     right hand contractures    Iron overload due to repeated red blood cell transfusions     Migraines     Multiple subsegmental pulmonary emboli without acute cor pulmonale (H) 02/01/2021    Oppositional defiant behavior     Presence of intrauterine contraceptive device 2/18/2020    Superficial venous thrombosis of arm, right 03/25/2021    Uncomplicated asthma      Past Surgical History:   Procedure Laterality Date    AS INSERT TUNNELED CV 2 CATH W/O PORT/PUMP      CHOLECYSTECTOMY      CV RIGHT HEART CATH MEASUREMENTS RECORDED N/A 11/18/2021    Procedure: Right Heart Cath;  Surgeon: Jackson Stauffer MD;  Location:  HEART CARDIAC CATH LAB    INSERT PORT VASCULAR ACCESS Left 4/21/2021    Procedure: INSERTION, VASCULAR ACCESS PORT (NOT SURE ON SIDE UNTIL REMOVAL);  Surgeon: Rajan More MD;  Location: [facility] OR    IR CHEST PORT PLACEMENT > 5 YRS OF AGE  4/21/2021    IR CVC NON TUNNEL LINE REMOVAL  5/6/2021    IR CVC NON TUNNEL PLACEMENT > 5 YRS  04/07/2020    IR CVC NON TUNNEL PLACEMENT > 5 YRS  4/30/2021    IR CVC NON TUNNEL PLACEMENT > 5 YRS  9/7/2022    IR PORT REMOVAL LEFT  4/21/2021    REMOVE PORT VASCULAR ACCESS Left 4/21/2021    Procedure: REMOVAL, VASCULAR ACCESS PORT LEFT;  Surgeon: Rajan More MD;  Location: [facility] OR    REPAIR TENDON ELBOW Right 10/02/2019    Procedure: Right Elbow Flexor Lengthening, Flexor Pronator Slide Of Wrist and Finger, Thumb Adductor Lengthening;  Surgeon: Anai Franco MD;  Location: [facility]    TONSILLECTOMY Bilateral 10/02/2019    Procedure: Bilateral Tonsillectomy;  Surgeon: Farhana Guy MD;  Location: [facility] OR    ZZC BREAST REDUCTION (INCLUDES LIPO) TIER 3 Bilateral 04/23/2019  "    albuterol (PROVENTIL) (2.5 MG/3ML) 0.083% neb solution  aspirin (ASA) 81 MG chewable tablet  budesonide-formoterol (SYMBICORT) 160-4.5 MCG/ACT Inhaler  deferasirox (JADENU) 360 MG tablet  Deferiprone, Twice Daily, 1000 MG TABS  diphenhydrAMINE (BENADRYL) 50 MG capsule  EPINEPHrine (ANY BX GENERIC EQUIV) 0.3 MG/0.3ML injection 2-pack  gabapentin (NEURONTIN) 300 MG capsule  hydroxyurea (HYDREA) 500 MG capsule  ibuprofen (ADVIL/MOTRIN) 800 MG tablet  melatonin 5 MG tablet  methocarbamol (ROBAXIN) 750 MG tablet  methylPREDNISolone (MEDROL) 32 MG tablet  naloxone (NARCAN) 4 MG/0.1ML nasal spray  ondansetron (ZOFRAN ODT) 8 MG ODT tab  oxyCODONE IR (ROXICODONE) 10 MG tablet  pantoprazole (PROTONIX) 40 MG EC tablet      Allergies   Allergen Reactions    Contrast Dye Angioedema     Hives and breathing issues    Fish-Derived Products Hives    Seafood Hives    Adhesive Tape Hives     Primipore dressing causes hives    Gadolinium     Iodinated Contrast Media      Family History  Family History   Problem Relation Age of Onset    Sickle Cell Trait Mother     Hypertension Mother     Asthma Mother     Sickle Cell Trait Father     Glaucoma No family hx of     Macular Degeneration No family hx of     Diabetes No family hx of     Gout No family hx of      Social History   Social History     Tobacco Use    Smoking status: Never     Passive exposure: Never    Smokeless tobacco: Never   Substance Use Topics    Alcohol use: Not Currently     Alcohol/week: 0.0 standard drinks of alcohol    Drug use: Never      A medically appropriate review of systems was performed with pertinent positives and negatives noted in the HPI, and all other systems negative.    Physical Exam   BP: (!) 150/89  Pulse: 106  Temp: 98.1  F (36.7  C)  Resp: 18  Height: 162.6 cm (5' 4\")  Weight: 74.8 kg (165 lb)  SpO2: 92 %  Physical Exam      GENERAL APPEARANCE: The patient is well developed, well appearing, and in no acute distress.  HEAD:  Normocephalic and " atraumatic.   EENT: Voice normal.  Uvula midline without exudates.  NECK: Trachea is midline.No lymphadenopathy or tenderness.  LUNGS: Breath sounds are equal and clear bilaterally. No wheezes, rhonchi, or rales.  HEART: Regular rate and normal rhythm.  Radial pulses 2+ bilaterally.  ABDOMEN: Soft, flat, and benign. No mass, tenderness, guarding, or rebound.  EXTREMITIES: No cyanosis, clubbing, or edema.  Anterior thighs without lesions.  NEUROLOGIC: No focal sensory or motor deficits are noted.  PSYCHIATRIC: The patient is awake, alert.  Appropriate mood and affect.  SKIN: Warm, dry, and well perfused. Good turgor.      ED Course, Procedures, & Data     ED Course as of 11/15/24 2207   Fri Nov 15, 2024   2156 Reevaluated patient in her exam room.  She feels improved after 2 doses of Dilaudid.  Discussed with her waiting for UA and if she is continuing to still feel well she is able to discharge.          Results for orders placed or performed during the hospital encounter of 11/15/24   Comprehensive metabolic panel     Status: Abnormal   Result Value Ref Range    Sodium 140 135 - 145 mmol/L    Potassium 4.1 3.4 - 5.3 mmol/L    Carbon Dioxide (CO2) 23 22 - 29 mmol/L    Anion Gap 11 7 - 15 mmol/L    Urea Nitrogen 7.2 6.0 - 20.0 mg/dL    Creatinine 0.54 0.51 - 0.95 mg/dL    GFR Estimate >90 >60 mL/min/1.73m2    Calcium 8.7 (L) 8.8 - 10.4 mg/dL    Chloride 106 98 - 107 mmol/L    Glucose 95 70 - 99 mg/dL    Alkaline Phosphatase 61 40 - 150 U/L    AST 40 0 - 45 U/L    ALT 33 0 - 50 U/L    Protein Total 7.1 6.4 - 8.3 g/dL    Albumin 4.3 3.5 - 5.2 g/dL    Bilirubin Total 2.1 (H) <=1.2 mg/dL   Lipase     Status: Normal   Result Value Ref Range    Lipase 31 13 - 60 U/L   Reticulocyte count     Status: None   Result Value Ref Range    % Reticulocyte      Absolute Reticulocyte     CBC with platelets and differential     Status: Abnormal   Result Value Ref Range    WBC Count 11.6 (H) 4.0 - 11.0 10e3/uL    RBC Count 2.31 (L) 3.80  - 5.20 10e6/uL    Hemoglobin 7.0 (L) 11.7 - 15.7 g/dL    Hematocrit 19.4 (L) 35.0 - 47.0 %    MCV 84 78 - 100 fL    MCH 30.3 26.5 - 33.0 pg    MCHC 36.1 31.5 - 36.5 g/dL    RDW 23.9 (H) 10.0 - 15.0 %    Platelet Count 408 150 - 450 10e3/uL    % Neutrophils 68 %    % Lymphocytes 18 %    % Monocytes 8 %    % Eosinophils 4 %    % Basophils 1 %    % Immature Granulocytes 0 %    NRBCs per 100 WBC 1 (H) <1 /100    Absolute Neutrophils 7.9 1.6 - 8.3 10e3/uL    Absolute Lymphocytes 2.1 0.8 - 5.3 10e3/uL    Absolute Monocytes 0.9 0.0 - 1.3 10e3/uL    Absolute Eosinophils 0.5 0.0 - 0.7 10e3/uL    Absolute Basophils 0.2 0.0 - 0.2 10e3/uL    Absolute Immature Granulocytes 0.0 <=0.4 10e3/uL    Absolute NRBCs 0.2 10e3/uL   CBC with platelets differential     Status: Abnormal    Narrative    The following orders were created for panel order CBC with platelets differential.  Procedure                               Abnormality         Status                     ---------                               -----------         ------                     CBC with platelets and d...[[id number]]  Abnormal            Final result                 Please view results for these tests on the individual orders.   Results for orders placed or performed during the hospital encounter of 11/14/24   US Upper Extremity Venous Duplex Left     Status: None    Narrative    EXAM: US UPPER EXTREMITY VENOUS DUPLEX LEFT  LOCATION: [facility]  DATE: 11/14/2024    INDICATION: Left upper extremity pain.  COMPARISON: None.  TECHNIQUE: Venous Duplex ultrasound of the left upper extremity with (when possible) and without compression, augmentation, and duplex. Color flow and spectral Doppler with waveform analysis performed.    FINDINGS: Ultrasound includes evaluation of the internal jugular vein, innominate vein, subclavian vein, axillary vein, and brachial vein. The superficial cephalic and basilic veins were also  evaluated where seen.     LEFT: No deep venous thrombosis. No superficial thrombophlebitis.       Impression    IMPRESSION:   1.  No deep venous thrombosis in the left upper extremity.   Comprehensive metabolic panel     Status: Abnormal   Result Value Ref Range    Sodium 142 135 - 145 mmol/L    Potassium 3.7 3.4 - 5.3 mmol/L    Carbon Dioxide (CO2) 25 22 - 29 mmol/L    Anion Gap 10 7 - 15 mmol/L    Urea Nitrogen 9.2 6.0 - 20.0 mg/dL    Creatinine 0.64 0.51 - 0.95 mg/dL    GFR Estimate >90 >60 mL/min/1.73m2    Calcium 8.7 (L) 8.8 - 10.4 mg/dL    Chloride 107 98 - 107 mmol/L    Glucose 96 70 - 99 mg/dL    Alkaline Phosphatase 59 40 - 150 U/L    AST 37 0 - 45 U/L    ALT 28 0 - 50 U/L    Protein Total 7.0 6.4 - 8.3 g/dL    Albumin 4.2 3.5 - 5.2 g/dL    Bilirubin Total 2.0 (H) <=1.2 mg/dL   CBC with platelets and differential     Status: Abnormal   Result Value Ref Range    WBC Count 13.3 (H) 4.0 - 11.0 10e3/uL    RBC Count 2.35 (L) 3.80 - 5.20 10e6/uL    Hemoglobin 7.2 (L) 11.7 - 15.7 g/dL    Hematocrit 20.2 (L) 35.0 - 47.0 %    MCV 86 78 - 100 fL    MCH 30.6 26.5 - 33.0 pg    MCHC 35.6 31.5 - 36.5 g/dL    RDW 23.9 (H) 10.0 - 15.0 %    Platelet Count 430 150 - 450 10e3/uL    % Neutrophils 69 %    % Lymphocytes 19 %    % Monocytes 7 %    % Eosinophils 4 %    % Basophils 2 %    % Immature Granulocytes 0 %    NRBCs per 100 WBC 2 (H) <1 /100    Absolute Neutrophils 9.2 (H) 1.6 - 8.3 10e3/uL    Absolute Lymphocytes 2.5 0.8 - 5.3 10e3/uL    Absolute Monocytes 0.9 0.0 - 1.3 10e3/uL    Absolute Eosinophils 0.5 0.0 - 0.7 10e3/uL    Absolute Basophils 0.2 0.0 - 0.2 10e3/uL    Absolute Immature Granulocytes 0.0 <=0.4 10e3/uL    Absolute NRBCs 0.2 10e3/uL   CBC with platelets differential     Status: Abnormal    Narrative    The following orders were created for panel order CBC with platelets differential.  Procedure                               Abnormality         Status                     ---------                                -----------         ------                     CBC with platelets and d...[[id number]]  Abnormal            Final result                 Please view results for these tests on the individual orders.     Medications   lactated ringers infusion ( Intravenous Canceled Entry 11/15/24 2158)   HYDROmorphone (DILAUDID) injection 1 mg (has no administration in time range)   ketorolac (TORADOL) injection 30 mg (30 mg Intravenous $Given 11/15/24 2002)   ondansetron (ZOFRAN) injection 8 mg (8 mg Intravenous $Given 11/15/24 2002)   HYDROmorphone (DILAUDID) injection 1 mg (1 mg Intravenous $Given 11/15/24 2001)   HYDROmorphone (DILAUDID) injection 1 mg (1 mg Intravenous $Given 11/15/24 2103)     Labs Ordered and Resulted from Time of ED Arrival to Time of ED Departure   COMPREHENSIVE METABOLIC PANEL - Abnormal       Result Value    Sodium 140      Potassium 4.1      Carbon Dioxide (CO2) 23      Anion Gap 11      Urea Nitrogen 7.2      Creatinine 0.54      GFR Estimate >90      Calcium 8.7 (*)     Chloride 106      Glucose 95      Alkaline Phosphatase 61      AST 40      ALT 33      Protein Total 7.1      Albumin 4.3      Bilirubin Total 2.1 (*)    CBC WITH PLATELETS AND DIFFERENTIAL - Abnormal    WBC Count 11.6 (*)     RBC Count 2.31 (*)     Hemoglobin 7.0 (*)     Hematocrit 19.4 (*)     MCV 84      MCH 30.3      MCHC 36.1      RDW 23.9 (*)     Platelet Count 408      % Neutrophils 68      % Lymphocytes 18      % Monocytes 8      % Eosinophils 4      % Basophils 1      % Immature Granulocytes 0      NRBCs per 100 WBC 1 (*)     Absolute Neutrophils 7.9      Absolute Lymphocytes 2.1      Absolute Monocytes 0.9      Absolute Eosinophils 0.5      Absolute Basophils 0.2      Absolute Immature Granulocytes 0.0      Absolute NRBCs 0.2     LIPASE - Normal    Lipase 31     RETICULOCYTE COUNT    % Reticulocyte        Absolute Reticulocyte       ROUTINE UA WITH MICROSCOPIC REFLEX TO CULTURE     No orders to display          Critical care  was not performed.     Medical Decision Making  The patient's presentation was of high complexity (a chronic illness severe exacerbation, progression, or side effect of treatment).    The patient's evaluation involved:  review of external note(s) from 3+ sources (see separate area of note for details)  review of 3+ test result(s) ordered prior to this encounter (see separate area of note for details)  ordering and/or review of 3+ test(s) in this encounter (see separate area of note for details)    The patient's management necessitated high risk (a parenteral controlled substance).    Assessment & Plan    This is a 24-year-old female with extensive medical history presenting with concerns for bilateral leg pain consistent with her prior sickle cell pain flares.  On presentation to the department she is mildly tachycardic rate 106 she is afebrile and is normoxic.  She does not appear toxic on exam has clear lungs, soft abdomen reassuring lower extremities without erythema, swelling.  Consider broad differential including sickle cell pain, viral syndrome, as well as other acute intra-abdominal processes such as pancreatitis pregnancy UTI nephrolithiasis amongst others.  Patient has soft abdomen here do not feel abdominal imaging at this time is warranted with her duration of symptoms.  Will initiate care plan order routine laboratory studies in addition to UA and lipase.    Labs reviewed.  CBC with leukocytosis 11.6 this is similar to patient'sPrior studies.  Patient hemic 7.0 per care plan patient is not to be transfused for anemia.  Chemistry reviewed and reassuring.  Lipase normal suggesting against pancreatitis.  Reticulocyte count initially unable to be obtained due to interfering substance per lab.  UA pending on reevaluation patient feels improved.    At time of signout awaiting UA result.  Patient did feel improved with 3 doses of her pain medication.  Discussed with her recommendations of taking her home  oxycodone as needed when she develops sickle cell pain at home to help prevent ED visits.  Referral was placed to hematology.  She was made aware of return precautions.    Patient seen and discussed with attending physician , who agrees with my plan of care.      I have reviewed the nursing notes. I have reviewed the findings, diagnosis, plan and need for follow up with the patient.    New Prescriptions    No medications on file       Final diagnoses:   Sickle cell pain crisis (H)       Swati Jacobo [facility] EMERGENCY DEPARTMENT  11/15/2024     Swati Jacobo PA-C  11/15/24 2740      ED Attending Physician Attestation    I Jackie Dale MD, cared for this patient with the Advanced Practice Provider (ANDREAS). I personally provided a substantive portion of the care for this patient, including approving the care plan for the number and complexity of problems addressed and taking responsibility related to the risk of complications and/or morbidity or mortality of patient management. Please see the ANDREAS's documentation for full details.       Jackie Dale MD  11/15/24 0907

## 2024-11-18 ENCOUNTER — NURSE TRIAGE (OUTPATIENT)
Dept: ONCOLOGY | Facility: CLINIC | Age: 24
End: 2024-11-18
Payer: COMMERCIAL

## 2024-11-18 ENCOUNTER — PATIENT OUTREACH (OUTPATIENT)
Dept: ONCOLOGY | Facility: CLINIC | Age: 24
End: 2024-11-18
Payer: COMMERCIAL

## 2024-11-18 ENCOUNTER — INFUSION THERAPY VISIT (OUTPATIENT)
Dept: ONCOLOGY | Facility: CLINIC | Age: 24
End: 2024-11-18
Attending: PEDIATRICS
Payer: COMMERCIAL

## 2024-11-18 ENCOUNTER — PATIENT OUTREACH (OUTPATIENT)
Dept: CARE COORDINATION | Facility: CLINIC | Age: 24
End: 2024-11-18
Payer: COMMERCIAL

## 2024-11-18 VITALS
OXYGEN SATURATION: 92 % | DIASTOLIC BLOOD PRESSURE: 74 MMHG | SYSTOLIC BLOOD PRESSURE: 131 MMHG | HEART RATE: 91 BPM | TEMPERATURE: 98.4 F

## 2024-11-18 DIAGNOSIS — G81.10 SPASTIC HEMIPLEGIA, UNSPECIFIED ETIOLOGY, UNSPECIFIED LATERALITY (H): ICD-10-CM

## 2024-11-18 DIAGNOSIS — D57.00 SICKLE CELL PAIN CRISIS (H): ICD-10-CM

## 2024-11-18 DIAGNOSIS — D57.00 SICKLE CELL PAIN CRISIS (H): Primary | ICD-10-CM

## 2024-11-18 PROCEDURE — 250N000011 HC RX IP 250 OP 636: Performed by: PEDIATRICS

## 2024-11-18 PROCEDURE — 96374 THER/PROPH/DIAG INJ IV PUSH: CPT

## 2024-11-18 PROCEDURE — 96375 TX/PRO/DX INJ NEW DRUG ADDON: CPT

## 2024-11-18 PROCEDURE — 96376 TX/PRO/DX INJ SAME DRUG ADON: CPT

## 2024-11-18 PROCEDURE — 96361 HYDRATE IV INFUSION ADD-ON: CPT

## 2024-11-18 PROCEDURE — 258N000003 HC RX IP 258 OP 636: Performed by: PEDIATRICS

## 2024-11-18 PROCEDURE — 250N000013 HC RX MED GY IP 250 OP 250 PS 637: Performed by: PEDIATRICS

## 2024-11-18 RX ORDER — HEPARIN SODIUM,PORCINE 10 UNIT/ML
5 VIAL (ML) INTRAVENOUS
Status: CANCELLED | OUTPATIENT
Start: 2025-01-01

## 2024-11-18 RX ORDER — ONDANSETRON 8 MG/1
8 TABLET, ORALLY DISINTEGRATING ORAL
Status: DISCONTINUED | OUTPATIENT
Start: 2024-11-18 | End: 2024-11-18 | Stop reason: HOSPADM

## 2024-11-18 RX ORDER — ONDANSETRON 2 MG/ML
8 INJECTION INTRAMUSCULAR; INTRAVENOUS EVERY 6 HOURS PRN
Status: DISCONTINUED | OUTPATIENT
Start: 2024-11-18 | End: 2024-11-18 | Stop reason: HOSPADM

## 2024-11-18 RX ORDER — ONDANSETRON 2 MG/ML
8 INJECTION INTRAMUSCULAR; INTRAVENOUS EVERY 6 HOURS PRN
Status: CANCELLED
Start: 2025-01-01

## 2024-11-18 RX ORDER — ONDANSETRON 4 MG/1
8 TABLET, FILM COATED ORAL
Status: CANCELLED
Start: 2025-01-01

## 2024-11-18 RX ORDER — OXYCODONE HYDROCHLORIDE 10 MG/1
10 TABLET ORAL EVERY 6 HOURS PRN
Qty: 20 TABLET | Refills: 0 | Status: SHIPPED | OUTPATIENT
Start: 2024-11-18 | End: 2024-12-02

## 2024-11-18 RX ORDER — KETOROLAC TROMETHAMINE 30 MG/ML
30 INJECTION, SOLUTION INTRAMUSCULAR; INTRAVENOUS ONCE
Status: CANCELLED
Start: 2025-01-01 | End: 2025-01-01

## 2024-11-18 RX ORDER — OXYCODONE HYDROCHLORIDE 10 MG/1
10 TABLET ORAL EVERY 6 HOURS PRN
Qty: 10 TABLET | Refills: 0 | Status: SHIPPED | OUTPATIENT
Start: 2024-11-18 | End: 2024-11-18

## 2024-11-18 RX ORDER — DIPHENHYDRAMINE HCL 25 MG
50 CAPSULE ORAL
Status: COMPLETED | OUTPATIENT
Start: 2024-11-18 | End: 2024-11-18

## 2024-11-18 RX ORDER — KETOROLAC TROMETHAMINE 30 MG/ML
30 INJECTION, SOLUTION INTRAMUSCULAR; INTRAVENOUS ONCE
Status: COMPLETED | OUTPATIENT
Start: 2024-11-18 | End: 2024-11-18

## 2024-11-18 RX ORDER — HEPARIN SODIUM (PORCINE) LOCK FLUSH IV SOLN 100 UNIT/ML 100 UNIT/ML
5 SOLUTION INTRAVENOUS
Status: DISCONTINUED | OUTPATIENT
Start: 2024-11-18 | End: 2024-11-18 | Stop reason: HOSPADM

## 2024-11-18 RX ORDER — DIPHENHYDRAMINE HCL 25 MG
50 CAPSULE ORAL
Status: CANCELLED
Start: 2025-01-01

## 2024-11-18 RX ORDER — HEPARIN SODIUM (PORCINE) LOCK FLUSH IV SOLN 100 UNIT/ML 100 UNIT/ML
5 SOLUTION INTRAVENOUS
Status: CANCELLED | OUTPATIENT
Start: 2025-01-01

## 2024-11-18 RX ADMIN — HEPARIN 5 ML: 100 SYRINGE at 11:31

## 2024-11-18 RX ADMIN — HYDROMORPHONE HYDROCHLORIDE 1 MG: 1 INJECTION, SOLUTION INTRAMUSCULAR; INTRAVENOUS; SUBCUTANEOUS at 10:20

## 2024-11-18 RX ADMIN — DIPHENHYDRAMINE HYDROCHLORIDE 50 MG: 25 CAPSULE ORAL at 09:11

## 2024-11-18 RX ADMIN — SODIUM CHLORIDE, POTASSIUM CHLORIDE, SODIUM LACTATE AND CALCIUM CHLORIDE 1000 ML: 600; 310; 30; 20 INJECTION, SOLUTION INTRAVENOUS at 09:13

## 2024-11-18 RX ADMIN — HYDROMORPHONE HYDROCHLORIDE 1 MG: 1 INJECTION, SOLUTION INTRAMUSCULAR; INTRAVENOUS; SUBCUTANEOUS at 11:17

## 2024-11-18 RX ADMIN — HYDROMORPHONE HYDROCHLORIDE 1 MG: 1 INJECTION, SOLUTION INTRAMUSCULAR; INTRAVENOUS; SUBCUTANEOUS at 09:11

## 2024-11-18 RX ADMIN — ONDANSETRON 8 MG: 2 INJECTION INTRAMUSCULAR; INTRAVENOUS at 09:29

## 2024-11-18 RX ADMIN — KETOROLAC TROMETHAMINE 30 MG: 30 INJECTION, SOLUTION INTRAMUSCULAR; INTRAVENOUS at 09:15

## 2024-11-18 NOTE — PATIENT INSTRUCTIONS
Contact Numbers    [facility] Main Line/[facility]: [phone]    Call with chills and/or temperature greater than or equal to 100.5, uncontrolled nausea/vomiting, diarrhea, constipation, dizziness, shortness of breath, chest pain, bleeding, unexplained bruising, or any new/concerning symptoms, questions/concerns.     If you are having any concerning symptoms or wish to speak to a provider before your next infusion visit, please call your care coordinator or triage to notify them so we can adequately serve you.       After Hours: [phone]    If after hours, weekends, or holidays, call main hospital  and ask for Oncology doctor on call.      November 2024 Sunday Monday Tuesday Wednesday Thursday Friday Saturday                            1    Admission   8:56 PM   Judson Tse MD   [facility] Emergency Department   (Discharge: 11/2/2024)    XR GENERAL XRAY  10:05 PM   (20 min.)   URXR3   [facility] Imaging 2       3    Admission   1:51 AM   Court Ring MD   [facility] Emergency Department   (Discharge: 11/3/2024) 4    BMT INFUSION 3 HR (180 MIN)  11:30 AM   (180 min.)    BMT INFUSION NURSE   [facility] [place] 5    Admission   7:16 PM   Francesco Green MD   [facility] Emergency Department   (Discharge: 11/6/2024) 6     7     8    LAB CENTRAL  12:00 PM   (15 min.)    [facility] DRAW   [facility]    ONC INFUSION 4 HR (240 MIN)  12:30 PM   (240 min.)    ONC INFUSION NURSE   [facility] 9    Admission   3:30 PM   Leslie Roper MD   [facility] Emergency Department   (Discharge: 11/9/2024)   10     11    SPEC INFUSION 3 HR (180 MIN)   8:00 AM   (180 min.)   [facility] INFUSION NURSE   [facility] [place] 12    Admission   1:50 PM   Kilo Dai MD   [facility] Emergency  Department   (Discharge: 11/12/2024) 13    Admission   2:14 PM   Ifeanyi Valdez MD   [facility] Emergency Department   (Discharge: 11/13/2024)    XR GENERAL XRAY   2:30 PM   (20 min.)   URXR3   [facility] Imaging 14    SPEC INFUSION 2 HR (120 MIN)  11:00 AM   (120 min.)   [facility] INFUSION NURSE   [facility] RETURN  11:00 AM   (30 min.)   [place] RPH   [facility]    Admission   8:52 PM   Jitendra Brandon DO   [facility] Emergency Department   (Discharge: 11/15/2024)    US VENOUS  10:00 PM   (30 min.)   URUS1   [facility] Imaging 15    RETURN ASTHMA   3:25 PM   (40 min.)   Jake Harvey MD   [facility]    Admission   7:13 PM   Jackie Dale MD   [facility] Emergency Department   (Discharge: 11/16/2024) 16       17     18    ONC INFUSION 3 HR (180 MIN)   8:30 AM   (180 min.)   [facility] ONC INFUSION NURSE   [facility] 19     20     21     22     23       24     25    RETURN CCSL   1:30 PM   (45 min.)   Patricia Mantilla CNP   [facility] 26     27     28     29 30 December 2024 Sunday Monday Tuesday Wednesday Thursday Friday Saturday   1     2     3     4     5    LAB PERIPHERAL   9:30 AM   (15 min.)   [facility] DRAW   [facility]    ONC INFUSION 4 HR (240 MIN)  10:00 AM   (240 min.)   [facility] ONC INFUSION NURSE   [facility] 6     7       8     9     10     11     12    ESOPHAGOGASTRODUODENOSCOPY  12:30 PM   Pedro Dawson MD   [facility] 13     14       15     16    MR ABDOMEN WO   6:30 AM   (45 min.)   UUMR1   [facility] 17     18     19     20     21       22     23     24     25     26     27     28       29     30     31                                          Lab Results:  No results found for this or any previous visit (from the past 12 hours).

## 2024-11-18 NOTE — TELEPHONE ENCOUNTER
Oncology Nurse Triage - Sickle Cell Pain Crisis:    Situation: Jennifer  calling about Sickle Cell Pain Crisis, requesting to be added on for IV fluids and pain medicine    Background:     Patient's last infusion was 11/15/24 ED  Last clinic visit date:10/31/24 w/Patricia Mantilla  Does patient have active treatment plan?  Yes      Assessment of Symptoms:  Onset/Duration of symptoms: 2 day    Is it typical sickle cell pain? Yes   Location: Generalized  Character: Dull ache           Intensity: 8/10    Any radiation of pain, numbness, tingling, weakness, warmth, swelling, discoloration of arms or legs?No     Fever?No  (if yes max temperature recorded in last 24 hours):      Chest Pain Present: No     Shortness of breath: No     Other home therapies tried: HEAT/HEATING PAD     Last home medication taken and when: Yesterday evening ibuprofen and oxycodone    Any Refills Needed?: Yes, oxycodone    Does patient have transportation & length of time to get to clinic: Yes, 10 min.        Recommendations:   If you do not hear from the infusion center by 2pm then you will not be able to get in for an infusion today. If symptoms worsen while waiting for call back, and/or you experience fever, chills, SOB, chest pain, cough, n/v, dizziness, numbness, swelling, discoloration of extremities, then seek emergency evaluation in Emergency Department.     0728 Secure message to Patricia Mantilla    0737 Patricia approving adding pt to wait list.     0742 Call to pt and informed her of available appt at 0830 with [facility]. Pt confirming she is able to make appt. Message sent to [facility] to add to schedule.

## 2024-11-18 NOTE — PROGRESS NOTES
Ambulatory Care Management- Transportation Support  Specialty SW - [facility]     Data/Intervention:  Patient Name:    Jennifer Cervantes DOB/Age: 2000 (24 year old)     [facility] team member assisting Specialty SW with transportation request.      Assessment:  CHW/CTA called [employer] to arrange medical appointment transportation in conjunction with patient's insurance.     [employer] company: [employer]    A 12:00pm  time is scheduled at the [facility] to return home     Plan:  Patient is aware of the transportation plan.  available to assist with any other needs.      Maritza Vick MA

## 2024-11-18 NOTE — TELEPHONE ENCOUNTER
Narcotic Refill Request    Medication(s) requested:  Oxycodone  Person Requesting Refill: Patient  What pain is the medication treating: Sickle cell pain  How is the medication being taken?:4 tablets per day  Does pt have enough for today? No  Is pain being adequately controlled on the current regimen?: Yes  Experiencing any side effects from medication?: No    Date of most recent appointment:  10/31/24 aida/Patricia Mantilla  Any No Show Visits: No  Next appointment:   11/25/24 aida/Patricia Mantilla  Last fill date and by whom:  11/11/24 aida/Patricia Mantilla   Reviewed: No access    Routed/Paged provider: Patricia Mantilla

## 2024-11-18 NOTE — PROGRESS NOTES
Infusion Nursing Note:  Jennifer Cervantes presents today for IVF and pain medications.    Patient seen by provider today: No    Note: Pt presented to clinic with c/o pain 8/10 everywhere.  Her pain goal today is 4/10, which was reached by time of discharge.  Pt declined interventions aside from pain medications, positioning and heat.      Intravenous Access:  Implanted Port.    Treatment Conditions:  Not Applicable.    Post Infusion Assessment:  Patient tolerated infusion without incident.  Blood return noted pre and post infusion.  Site patent and intact, free from redness, edema or discomfort.  No evidence of extravasations.  Access discontinued per protocol.    Discharge Plan:   Patient declined prescription refills.  Discharge instructions reviewed with: Patient.  Patient and/or family verbalized understanding of discharge instructions and all questions answered.  AVS to patient via [employer].  Patient will return 11/25/2024 for next appointment with ANDREAS.   Patient discharged in stable condition accompanied by: self.  Departure Mode: Ambulatory.    Lupe Bolton RN

## 2024-11-18 NOTE — TELEPHONE ENCOUNTER
Pt called requesting assistance for ride home.   1000  request sent to call pt to assist with ride home     Pt also called to check status of refill of oxycodone, prescription was sent to [address] pharmacy at 7:37am today. Pt to check with pharmacy before leaving clinic.

## 2024-11-20 ENCOUNTER — NURSE TRIAGE (OUTPATIENT)
Dept: ONCOLOGY | Facility: CLINIC | Age: 24
End: 2024-11-20
Payer: COMMERCIAL

## 2024-11-20 ENCOUNTER — HOSPITAL ENCOUNTER (EMERGENCY)
Facility: CLINIC | Age: 24
Discharge: HOME | End: 2024-11-21
Attending: EMERGENCY MEDICINE
Payer: COMMERCIAL

## 2024-11-20 ENCOUNTER — INFUSION THERAPY VISIT (OUTPATIENT)
Dept: INFUSION THERAPY | Facility: CLINIC | Age: 24
End: 2024-11-20
Attending: PEDIATRICS
Payer: COMMERCIAL

## 2024-11-20 ENCOUNTER — PATIENT OUTREACH (OUTPATIENT)
Dept: CARE COORDINATION | Facility: CLINIC | Age: 24
End: 2024-11-20
Payer: COMMERCIAL

## 2024-11-20 VITALS
SYSTOLIC BLOOD PRESSURE: 120 MMHG | DIASTOLIC BLOOD PRESSURE: 76 MMHG | OXYGEN SATURATION: 98 % | HEART RATE: 95 BPM | TEMPERATURE: 97.9 F | RESPIRATION RATE: 16 BRPM

## 2024-11-20 DIAGNOSIS — G81.10 SPASTIC HEMIPLEGIA, UNSPECIFIED ETIOLOGY, UNSPECIFIED LATERALITY (H): ICD-10-CM

## 2024-11-20 DIAGNOSIS — D57.00 SICKLE CELL PAIN CRISIS (H): Primary | ICD-10-CM

## 2024-11-20 DIAGNOSIS — D57.00 SICKLE CELL PAIN CRISIS (H): ICD-10-CM

## 2024-11-20 PROCEDURE — 258N000003 HC RX IP 258 OP 636: Performed by: PEDIATRICS

## 2024-11-20 PROCEDURE — 96361 HYDRATE IV INFUSION ADD-ON: CPT

## 2024-11-20 PROCEDURE — 96375 TX/PRO/DX INJ NEW DRUG ADDON: CPT

## 2024-11-20 PROCEDURE — 96376 TX/PRO/DX INJ SAME DRUG ADON: CPT | Performed by: EMERGENCY MEDICINE

## 2024-11-20 PROCEDURE — 250N000013 HC RX MED GY IP 250 OP 250 PS 637: Performed by: PEDIATRICS

## 2024-11-20 PROCEDURE — 99284 EMERGENCY DEPT VISIT MOD MDM: CPT | Performed by: EMERGENCY MEDICINE

## 2024-11-20 PROCEDURE — 96374 THER/PROPH/DIAG INJ IV PUSH: CPT | Performed by: EMERGENCY MEDICINE

## 2024-11-20 PROCEDURE — 99284 EMERGENCY DEPT VISIT MOD MDM: CPT | Mod: 25 | Performed by: EMERGENCY MEDICINE

## 2024-11-20 PROCEDURE — 96376 TX/PRO/DX INJ SAME DRUG ADON: CPT

## 2024-11-20 PROCEDURE — 96374 THER/PROPH/DIAG INJ IV PUSH: CPT

## 2024-11-20 PROCEDURE — 250N000011 HC RX IP 250 OP 636: Performed by: PEDIATRICS

## 2024-11-20 PROCEDURE — 96361 HYDRATE IV INFUSION ADD-ON: CPT | Performed by: EMERGENCY MEDICINE

## 2024-11-20 RX ORDER — DIPHENHYDRAMINE HCL 25 MG
50 CAPSULE ORAL
Status: COMPLETED | OUTPATIENT
Start: 2024-11-20 | End: 2024-11-20

## 2024-11-20 RX ORDER — ONDANSETRON 2 MG/ML
8 INJECTION INTRAMUSCULAR; INTRAVENOUS EVERY 6 HOURS PRN
Start: 2025-01-01

## 2024-11-20 RX ORDER — DIPHENHYDRAMINE HCL 25 MG
50 CAPSULE ORAL
Start: 2025-01-01

## 2024-11-20 RX ORDER — HEPARIN SODIUM (PORCINE) LOCK FLUSH IV SOLN 100 UNIT/ML 100 UNIT/ML
5 SOLUTION INTRAVENOUS
OUTPATIENT
Start: 2025-01-01

## 2024-11-20 RX ORDER — KETOROLAC TROMETHAMINE 30 MG/ML
30 INJECTION, SOLUTION INTRAMUSCULAR; INTRAVENOUS ONCE
Start: 2025-01-01 | End: 2025-01-01

## 2024-11-20 RX ORDER — ONDANSETRON 4 MG/1
8 TABLET, FILM COATED ORAL
Start: 2025-01-01

## 2024-11-20 RX ORDER — KETOROLAC TROMETHAMINE 30 MG/ML
30 INJECTION, SOLUTION INTRAMUSCULAR; INTRAVENOUS ONCE
Status: COMPLETED | OUTPATIENT
Start: 2024-11-20 | End: 2024-11-20

## 2024-11-20 RX ORDER — HEPARIN SODIUM,PORCINE 10 UNIT/ML
5 VIAL (ML) INTRAVENOUS
OUTPATIENT
Start: 2025-01-01

## 2024-11-20 RX ORDER — ONDANSETRON 2 MG/ML
8 INJECTION INTRAMUSCULAR; INTRAVENOUS EVERY 6 HOURS PRN
Status: DISCONTINUED | OUTPATIENT
Start: 2024-11-20 | End: 2024-11-20 | Stop reason: HOSPADM

## 2024-11-20 RX ORDER — HEPARIN SODIUM (PORCINE) LOCK FLUSH IV SOLN 100 UNIT/ML 100 UNIT/ML
5 SOLUTION INTRAVENOUS
Status: DISCONTINUED | OUTPATIENT
Start: 2024-11-20 | End: 2024-11-20 | Stop reason: HOSPADM

## 2024-11-20 RX ADMIN — SODIUM CHLORIDE, POTASSIUM CHLORIDE, SODIUM LACTATE AND CALCIUM CHLORIDE 1000 ML: 600; 310; 30; 20 INJECTION, SOLUTION INTRAVENOUS at 08:43

## 2024-11-20 RX ADMIN — HYDROMORPHONE HYDROCHLORIDE 1 MG: 1 INJECTION, SOLUTION INTRAMUSCULAR; INTRAVENOUS; SUBCUTANEOUS at 08:39

## 2024-11-20 RX ADMIN — DIPHENHYDRAMINE HYDROCHLORIDE 50 MG: 25 CAPSULE ORAL at 08:47

## 2024-11-20 RX ADMIN — HEPARIN SODIUM (PORCINE) LOCK FLUSH IV SOLN 100 UNIT/ML 5 ML: 100 SOLUTION at 11:07

## 2024-11-20 RX ADMIN — HYDROMORPHONE HYDROCHLORIDE 1 MG: 1 INJECTION, SOLUTION INTRAMUSCULAR; INTRAVENOUS; SUBCUTANEOUS at 09:40

## 2024-11-20 RX ADMIN — HYDROMORPHONE HYDROCHLORIDE 1 MG: 1 INJECTION, SOLUTION INTRAMUSCULAR; INTRAVENOUS; SUBCUTANEOUS at 10:44

## 2024-11-20 RX ADMIN — ONDANSETRON 8 MG: 2 INJECTION INTRAMUSCULAR; INTRAVENOUS at 08:41

## 2024-11-20 RX ADMIN — KETOROLAC TROMETHAMINE 30 MG: 30 INJECTION, SOLUTION INTRAMUSCULAR; INTRAVENOUS at 08:45

## 2024-11-20 NOTE — PROGRESS NOTES
Infusion Nursing Note:  Jennifer Cervantes presents today for Patient presents with:  Infusion: Sickle cell crisis      Patient seen by provider today: No   present during visit today: No    Note:   Patient reports that she awoke with pain in her left leg this morning. She rates her pain 8/10  During today's [facility] appointment, orders from Eric Duncan MD  were completed.Patient identification verified by name and date of birth.  Assessment completed.  Vitals recorded in Doc Flowsheets.  Patient was provided with education regarding medication/procedure and possible side effects.  Patient verbalized understanding.    Frequency: PRN  Labs: none  The following medications were given during treatment:  Administrations This Visit       diphenhydrAMINE (BENADRYL) capsule 50 mg       Admin Date  11/20/2024 Action  $Given Dose  50 mg Route  Oral Documented By  Elena Kelly RN              heparin lock flush 100 unit/mL injection 5 mL       Admin Date  11/20/2024 Action  $Given Dose  5 mL Route  Intracatheter Documented By  Elena Kelly RN              HYDROmorphone (DILAUDID) injection 1 mg       Admin Date  11/20/2024 Action  $Given Dose  1 mg Route  Intravenous Documented By  Elena Kelly RN               Admin Date  11/20/2024 Action  $Given Dose  1 mg Route  Intravenous Documented By  Elena Kelly RN               Admin Date  11/20/2024 Action  $Given Dose  1 mg Route  Intravenous Documented By  Rin Mckeon RN              ketorolac (TORADOL) injection 30 mg       Admin Date  11/20/2024 Action  $Given Dose  30 mg Route  Intravenous Documented By  Elena Kelly RN              lactated ringers BOLUS 1,000 mL       Admin Date  11/20/2024 Action  $New Bag Dose  1,000 mL Rate  500 mL/hr Route  Intravenous Documented By  Elena Kelly RN              ondansetron (ZOFRAN) injection 8 mg       Admin Date  11/20/2024 Action  $Given Dose  8 mg  Route  Intravenous Documented By  Elena Kelly RN                      Intravenous Access:.  Port Accessed:    Treatment Conditions:  Not Applicable.      Post Infusion Assessment:  Patient tolerated infusion without incident.  Post treatment pain reported as 4/10 at discharge  Site patent and intact, free from redness, edema or discomfort.  No evidence of extravasations.  Access discontinued per protocol.       Discharge Plan:   Discharge instructions reviewed with: Patient.  AVS to patient via [employer].  Patient will return   Future Appointments   Date Time Provider Department Center   12/5/2024 10:00 AM  ONC INFUSION NURSE [facility]      for next appointment.   Patient discharged in stable condition accompanied by: self.  Departure Mode: Ambulatory    /76 (BP Location: Left arm, Patient Position: Semi-Short's, Cuff Size: Adult Regular)   Pulse 95   Temp 97.9  F (36.6  C) (Oral)   Resp 16   LMP  (LMP Unknown)   SpO2 98%   Elena Kelly RN on 11/20/2024 at 8:07 AM  .

## 2024-11-20 NOTE — TELEPHONE ENCOUNTER
Oncology Nurse Triage - Sickle Cell Pain Crisis:    Situation: Jennifer  calling about Sickle Cell Pain Crisis, requesting to be added on for IV fluids and pain medicine    Background:     Patient's last infusion was 11/18/24  Last clinic visit date:10/31/24 aida/Patricia Mantilla  Does patient have active treatment plan?  Yes      Assessment of Symptoms:  Onset/Duration of symptoms: 2 day    Is it typical sickle cell pain? Yes   Location: Left leg  Character: Dull ache           Intensity: 8/10    Any radiation of pain, numbness, tingling, weakness, warmth, swelling, discoloration of arms or legs?No     Fever?No  (if yes max temperature recorded in last 24 hours):      Chest Pain Present: No     Shortness of breath: No     Other home therapies tried: HEAT/HEATING PAD     Last home medication taken and when: Oxycodone and ibuprofen last night    Any Refills Needed?: No     Does patient have transportation & length of time to get to clinic: Yes, pt is 10 min away. Will need assistance with transportation home after infusion.         Recommendations:   If you do not hear from the infusion center by 2pm then you will not be able to get in for an infusion today. If symptoms worsen while waiting for call back, and/or you experience fever, chills, SOB, chest pain, cough, n/v, dizziness, numbness, swelling, discoloration of extremities, then seek emergency evaluation in Emergency Department.

## 2024-11-20 NOTE — TELEPHONE ENCOUNTER
[facility] Pt confirming she is able to make appt. Message sent to [facility] to add pt to schedule. SW notified to assist with transportation home.

## 2024-11-20 NOTE — PATIENT INSTRUCTIONS
Dear Jennifer Cervantes    Thank you for choosing [facility] ([facility] for your infusion.  The following information is a summary of our appointment as well as important reminders.      If you are a transplant patient and require transplant education, please click on this link: [email or web address].    If you have any questions on your upcoming Specialty Infusion appointments, please call scheduling at [phone].  It was a pleasure taking care of you today.    Sincerely,    [facility]  [facility]  [address] [place] [address]  [id number]  Phone:  [phone]

## 2024-11-20 NOTE — PROGRESS NOTES
Ambulatory Care Management- Transportation Support  Specialty SW - [facility]     Data/Intervention:  Patient Name:    Jennifer Cervantes DOB/Age: 2000 (24 year old)     [facility] team member assisting Specialty SW with transportation request.      Assessment:  CHW/CTA called [employer] to arrange medical appointment transportation in conjunction with patient's insurance.     Taxi company: [employer]    Patient will need to call above taxi company at phone number: [phone] when ready for return ride home.      Plan:  Patient is aware of the transportation plan.  available to assist with any other needs.      Lisandra Kilgore MA

## 2024-11-21 ENCOUNTER — PATIENT OUTREACH (OUTPATIENT)
Dept: ONCOLOGY | Facility: CLINIC | Age: 24
End: 2024-11-21

## 2024-11-21 VITALS
TEMPERATURE: 97.8 F | OXYGEN SATURATION: 97 % | BODY MASS INDEX: 28.29 KG/M2 | DIASTOLIC BLOOD PRESSURE: 79 MMHG | WEIGHT: 165.7 LBS | SYSTOLIC BLOOD PRESSURE: 114 MMHG | RESPIRATION RATE: 16 BRPM | HEIGHT: 64 IN | HEART RATE: 92 BPM

## 2024-11-21 LAB
ALBUMIN SERPL BCG-MCNC: 4.2 G/DL (ref 3.5–5.2)
ALP SERPL-CCNC: 57 U/L (ref 40–150)
ALT SERPL W P-5'-P-CCNC: 33 U/L (ref 0–50)
ANION GAP SERPL CALCULATED.3IONS-SCNC: 10 MMOL/L (ref 7–15)
AST SERPL W P-5'-P-CCNC: 45 U/L (ref 0–45)
BASOPHILS # BLD AUTO: 0.2 10E3/UL (ref 0–0.2)
BASOPHILS NFR BLD AUTO: 2 %
BILIRUB SERPL-MCNC: 2.1 MG/DL
BUN SERPL-MCNC: 10.3 MG/DL (ref 6–20)
CALCIUM SERPL-MCNC: 8.6 MG/DL (ref 8.8–10.4)
CHLORIDE SERPL-SCNC: 104 MMOL/L (ref 98–107)
CREAT SERPL-MCNC: 0.74 MG/DL (ref 0.51–0.95)
EGFRCR SERPLBLD CKD-EPI 2021: >90 ML/MIN/1.73M2
EOSINOPHIL # BLD AUTO: 0.6 10E3/UL (ref 0–0.7)
EOSINOPHIL NFR BLD AUTO: 6 %
ERYTHROCYTE [DISTWIDTH] IN BLOOD BY AUTOMATED COUNT: 22.2 % (ref 10–15)
GLUCOSE SERPL-MCNC: 91 MG/DL (ref 70–99)
HCO3 SERPL-SCNC: 23 MMOL/L (ref 22–29)
HCT VFR BLD AUTO: 21.5 % (ref 35–47)
HGB BLD-MCNC: 7.7 G/DL (ref 11.7–15.7)
IMM GRANULOCYTES # BLD: 0.1 10E3/UL
IMM GRANULOCYTES NFR BLD: 1 %
LYMPHOCYTES # BLD AUTO: 2.3 10E3/UL (ref 0.8–5.3)
LYMPHOCYTES NFR BLD AUTO: 22 %
MCH RBC QN AUTO: 30.7 PG (ref 26.5–33)
MCHC RBC AUTO-ENTMCNC: 35.8 G/DL (ref 31.5–36.5)
MCV RBC AUTO: 86 FL (ref 78–100)
MONOCYTES # BLD AUTO: 1 10E3/UL (ref 0–1.3)
MONOCYTES NFR BLD AUTO: 9 %
NEUTROPHILS # BLD AUTO: 6.5 10E3/UL (ref 1.6–8.3)
NEUTROPHILS NFR BLD AUTO: 61 %
NRBC # BLD AUTO: 0.1 10E3/UL
NRBC BLD AUTO-RTO: 1 /100
PLATELET # BLD AUTO: 419 10E3/UL (ref 150–450)
POTASSIUM SERPL-SCNC: 4 MMOL/L (ref 3.4–5.3)
PROT SERPL-MCNC: 6.9 G/DL (ref 6.4–8.3)
RBC # BLD AUTO: 2.51 10E6/UL (ref 3.8–5.2)
SODIUM SERPL-SCNC: 137 MMOL/L (ref 135–145)
WBC # BLD AUTO: 10.6 10E3/UL (ref 4–11)

## 2024-11-21 PROCEDURE — 80053 COMPREHEN METABOLIC PANEL: CPT | Performed by: EMERGENCY MEDICINE

## 2024-11-21 PROCEDURE — 85025 COMPLETE CBC W/AUTO DIFF WBC: CPT | Performed by: EMERGENCY MEDICINE

## 2024-11-21 PROCEDURE — 36415 COLL VENOUS BLD VENIPUNCTURE: CPT | Performed by: EMERGENCY MEDICINE

## 2024-11-21 PROCEDURE — 258N000003 HC RX IP 258 OP 636: Performed by: EMERGENCY MEDICINE

## 2024-11-21 PROCEDURE — 250N000011 HC RX IP 250 OP 636: Performed by: EMERGENCY MEDICINE

## 2024-11-21 RX ORDER — HEPARIN SODIUM (PORCINE) LOCK FLUSH IV SOLN 100 UNIT/ML 100 UNIT/ML
100 SOLUTION INTRAVENOUS ONCE
Status: COMPLETED | OUTPATIENT
Start: 2024-11-21 | End: 2024-11-21

## 2024-11-21 RX ADMIN — HYDROMORPHONE HYDROCHLORIDE 1 MG: 1 INJECTION, SOLUTION INTRAMUSCULAR; INTRAVENOUS; SUBCUTANEOUS at 03:02

## 2024-11-21 RX ADMIN — HEPARIN 100 UNITS: 100 SYRINGE at 04:40

## 2024-11-21 RX ADMIN — SODIUM CHLORIDE, POTASSIUM CHLORIDE, SODIUM LACTATE AND CALCIUM CHLORIDE 500 ML: 600; 310; 30; 20 INJECTION, SOLUTION INTRAVENOUS at 00:53

## 2024-11-21 RX ADMIN — HYDROMORPHONE HYDROCHLORIDE 1 MG: 1 INJECTION, SOLUTION INTRAMUSCULAR; INTRAVENOUS; SUBCUTANEOUS at 00:52

## 2024-11-21 RX ADMIN — HYDROMORPHONE HYDROCHLORIDE 1 MG: 1 INJECTION, SOLUTION INTRAMUSCULAR; INTRAVENOUS; SUBCUTANEOUS at 01:57

## 2024-11-21 ASSESSMENT — ACTIVITIES OF DAILY LIVING (ADL)
ADLS_ACUITY_SCORE: 0

## 2024-11-21 NOTE — DISCHARGE INSTRUCTIONS
Please make an appointment to follow up with [facility] (phone: [phone]) as soon as possible.    If you have worsening symptoms including increased pain, chest pain, shortness of breath, new neurologic symptoms or other concerns return to the emergency department for reevaluation.     RISK                                                          Points  [] Previous VTE                                           3  [] Thrombophilia                                        2  [] Lower limb paralysis                              2   [] Current Cancer                                       2   [x] Immobilization > 24 hrs                        1  [] ICU/CCU stay > 24 hours                       1  [x] Age > 60                                                   1  DVT ppx: Subq Heparin  GI ppx: Protonix  Diet: Regular DASH  Electrolytes replaced PRN  Dispo: Pending clinical course  FULL CODE RISK                                                          Points  [] Previous VTE                                           3  [] Thrombophilia                                        2  [] Lower limb paralysis                              2   [] Current Cancer                                       2   [x] Immobilization > 24 hrs                        1  [] ICU/CCU stay > 24 hours                       1  [x] Age > 60                                                   1  DVT ppx: Subq Heparin  GI ppx: Protonix  Diet: Regular DASH  Electrolytes replaced PRN  FULL CODE

## 2024-11-21 NOTE — ED PROVIDER NOTES
The patient was accepted at shift change signout pending discharge if she is feeling better following her pain meds.  Patient reports she is feeling better, feels she is ready to discharge.  She is encouraged to follow-up with her outpatient provider, return with any concerns.    Dictation Disclaimer: Some of this Note has been completed with voice-recognition dictation software. Although errors are generally corrected real-time, there is the potential for a rare error to be present in the completed chart.       Court Ring MD  11/21/24 0433

## 2024-11-21 NOTE — ED PROVIDER NOTES
"ED Provider Note  [facility]      History     Chief Complaint   Patient presents with    Sickle Cell Pain Crisis     Infusion this AM. Sickle cell pain started around 2000, rating 9/10. Last took Oxycodone at 2000.     SHEILA Cervantes is a 24 year old female with a history of sickle cell disease c/b frequent pain crises, history of stroke leading to cognitive delays  who presets to the emergency department for sickle cell pain crisis.  The patient states that she developed sickle cell pain in her lower back over the weekend. She notes that the pain then moved into her left leg and arm. She notes that she typically only gets pain in her back. She did have an infusion today. She then went home and fell asleep. When she woke up she was in more pain. She then took Oxycodone and used essential oils to help with pain. She notes that essential oils have typically helped with her pain but this time it did not. She also took ibuprofen with no relief. She denies any fever, chest pain, shortness of breath or swelling in her leg.  She denies any new numbness, tingling or weakness into the extremities.  Denies any severe headache, nausea or vomiting.    Physical Exam   BP: 130/80  Pulse: 98  Temp: 98.2  F (36.8  C)  Resp: 16  Height: 162.6 cm (5' 4\")  Weight: 75.2 kg (165 lb 11.2 oz)  SpO2: 93 %  Physical Exam  General: patient is alert and oriented and in no acute distress   Head: atraumatic and normocephalic   EENT: moist mucus membranes   Neck: supple   Cardiovascular: regular rate and rhythm, extremities warm and well perfused, no lower extremity edema, 2+ radial pulses and DP pulses  Pulmonary: lungs clear to auscultation bilaterally   Abdomen: soft, non-tender   Musculoskeletal: Chronic deformity in the right upper extremity, normal range of motion of the remainder of the extremities  Neurological: No facial droop, no slurring of speech, 5 out of 5  strength in the left, finger abduction, " wrist extension, elbow flexion and extension, 5 out of 5 left hip flexion and extension, knee flexion and extension and ankle plantar dorsiflexion, sensation to light touch in the upper and lower extremities is intact  Skin: warm, dry      ED Course, Procedures, & Data      Procedures          No results found for any visits on 11/20/24.  Medications - No data to display  Labs Ordered and Resulted from Time of ED Arrival to Time of ED Departure - No data to display  No orders to display          Critical care was not performed.     Medical Decision Making  The patient's presentation was of high complexity (a chronic illness severe exacerbation, progression, or side effect of treatment).    The patient's evaluation involved:  ordering and/or review of 3+ test(s) in this encounter (see separate area of note for details)    The patient's management necessitated moderate risk (prescription drug management including medications given in the ED), high risk (a parenteral controlled substance), and further care after sign-out to Dr. Ring (see their note for further management).    Assessment & Plan    Ms. Cervantes is a 24 year old female with a history of sickle cell disease c/b frequent pain crises, history of stroke leading to cognitive delays  who presets to the emergency department for sickle cell pain crisis.  She is hemodynamically stable, afebrile and in no respiratory distress.  On exam I do not appreciate any new focal neurologic deficits.  She denies any recent infectious symptoms.  Denies any signs or symptoms of acute chest syndrome.  She reports this pain is consistent with her typical pain crises.  Labs show no leukocytosis.  Hemoglobin is stable at baseline of 7.7, bilirubin of 2.1, no other significant electrolyte abnormalities.  Plan to treat according to her care plan and discharge to home if pain is well-controlled.    I have reviewed the nursing notes. I have reviewed the findings, diagnosis, plan and  need for follow up with the patient.    New Prescriptions    No medications on file       Final diagnoses:   Sickle cell pain crisis (H)       Melissa Nguyen MD  [facility] EMERGENCY DEPARTMENT  11/20/2024     Melissa Nguyen MD  11/21/24 0202

## 2024-11-21 NOTE — ED TRIAGE NOTES
Reports receiving infusion this AM. Sickle cell pain started around 2000, rating 9/10. Last took Oxycodone at 2000.     Triage Assessment (Adult)       Row Name 11/20/24 5252          Triage Assessment    Airway WDL WDL        Respiratory WDL    Respiratory WDL WDL        Skin Circulation/Temperature WDL    Skin Circulation/Temperature WDL WDL        Cardiac WDL    Cardiac WDL WDL        Peripheral/Neurovascular WDL    Peripheral Neurovascular WDL WDL        Cognitive/Neuro/Behavioral WDL    Cognitive/Neuro/Behavioral WDL WDL

## 2024-11-21 NOTE — PROGRESS NOTES
[facility]: Cancer Care                                                                                          Completed chart audit to update Oncology Care Coordination enrollment status.  Reviewed POC and pt has appropriate follow up scheduled.       Signature:  Arely Krueger RN

## 2024-11-22 ENCOUNTER — HOSPITAL ENCOUNTER (EMERGENCY)
Facility: CLINIC | Age: 24
Discharge: HOME | End: 2024-11-22
Attending: FAMILY MEDICINE | Admitting: FAMILY MEDICINE
Payer: COMMERCIAL

## 2024-11-22 VITALS
BODY MASS INDEX: 27.1 KG/M2 | DIASTOLIC BLOOD PRESSURE: 72 MMHG | WEIGHT: 158.7 LBS | OXYGEN SATURATION: 95 % | HEART RATE: 51 BPM | HEIGHT: 64 IN | RESPIRATION RATE: 16 BRPM | TEMPERATURE: 98.2 F | SYSTOLIC BLOOD PRESSURE: 121 MMHG

## 2024-11-22 DIAGNOSIS — D57.00 SICKLE CELL PAIN CRISIS (H): ICD-10-CM

## 2024-11-22 PROCEDURE — 99282 EMERGENCY DEPT VISIT SF MDM: CPT | Performed by: FAMILY MEDICINE

## 2024-11-22 PROCEDURE — 99283 EMERGENCY DEPT VISIT LOW MDM: CPT | Performed by: FAMILY MEDICINE

## 2024-11-22 ASSESSMENT — ACTIVITIES OF DAILY LIVING (ADL): ADLS_ACUITY_SCORE: 0

## 2024-11-22 NOTE — PROGRESS NOTES
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 She had multiple thromboembolic events in 2021 despite adherent anticoagulation use (though warfarin was perpetually low) and there are concerns for chronic thromboembolic disease but did not have pulmonary HTN on a November 2021 cath. She is maintained on chronic PO opioids and twice-weekly infusion visits (since 1/24/22) but has been able to be maintained on this regimen and has stayed out of the ED most of the time with even rarer admissions for most of 2023. In 2024, her ED visits have increased somewhat but admissions remain rare.    Interval History:  Jennifer is seen today for routine hematology follow-up.     Today she is in the infusion center receiving IV fluids and pain medication for sickle cell pain.  She has had an increase in ED utilization over the past few weeks although during her last visit on 11/22 she left the ED before being treated with pain medication as she wanted to try to manage things at home.  Last week she informed me and Dr. Duncan that she would like to receive a 2-week supply of oxycodone and then stop oral oxycodone altogether.  When I inquired about her rationale for this she stated this was due to a few reasons.  She states \"I just want to be done with this\".  She recalls an encounter recently in the ED with one of her providers where she felt she was being singled out as an addict and feels this is one way to prove that this is not the case.  She does not have a specific plan on how " to best manage pain at home although does clearly state that her overall goal is to only utilize the infusion center when needed for pain flares and avoid the ED is much as possible.  She has been trying alternative techniques and measures to treat pain at home and recently bought essential oils which she has used in the bath and shower to help calm her.    Over the past few days she has developed voice hoarseness and mild fatigue.  She feels she may be getting a cold.  She denies any cough or shortness of breath.  Denies any fever/chills.  Denies any significant pharyngitis.    Today she inquires about alternative birth control options.  She currently has a Nexplanon but continues to get her period and feels she is bleeding more heavily than usual.  She is interested in an option to completely stop her periods.  She is planning on scheduling appointment in the PCP clinic to have the Nexplanon removed in the near future.          Sickle Cell Disease Comprehensive Checklist  Bone Health/Avascular Necrosis Screening/Symptoms (each visit): no new concerns today  Leg Ulcer evaluation (every visit): nothing to note  Hypertension (every visit):stable none for several months  Last pulmonary evaluation (asthma, AMAN, pulm HTN): 9/28/22, due for follow-up  Stroke/silent cerebral infarct Hx (Y/N): Yes TIA ~2014, first event ~age 1 with full stroke and R sided weakness  Last PCP Visit: 9/30/24 with Dr. Case  Vaccines:  PCV13: 5/13/19  Pneumovax (PPSV23): 3/04, 10/09, 7/12/19, 10/2024, due in 2029  Menactra: 4/2010, 9/2015 (MCV done 8/16/21)  MenB: 9/16/15, 5/13/19 (due in 2024)  Influenza: 10/11/24  Audiology (chelation): done 2/27/24    Comparison to Previous Audiogram dated 6/6/2022:     Pure Tone Thresholds 250-8000 Hz:    RIGHT: stable    LEFT: stable     High Frequency Audiometry 9,000-16,000Hz:     RIGHT: stable    LEFT: stable     Word Recognition Score:    RIGHT: stable    LEFT: stable    Plan last reviewed with  patient: 10/2/23    Patient background: 24 yo F, enjoys movies and kids though there are times where she does not really want to talk to people. Does not have a lot of social support at home.     Sickle Cell Disease History  Primary Hematologist Team: Jose Rafael Duncan  PCP: none  Genotype: SS  Acute Pain Crisis Treatment: (limiting IV)   ER   Dilaudid 1 mg IV q1h up to 3 doses  Toradol 30 mg IV x1   Maintenance IV fluids with LR  Other: Zofran 8 mg IV PRN nausea  Inpatient:  PCA Dilaudid 1 hr q30 minutes, no basal rate  Toradol 30 mg x6h x 4 hr  LR maintenance x 1-2 days  Other Medications: Zofran  ASA  Supportive Care: Docusate, Senna  Chronic Pain Medications:    Home regimen: oxycodone 15 mg p.o. q.4-6 hours p.r.n. breakthrough pain.  She also continues on Voltaren gel, and Zoloft among other medications.    -Also benefits from mental health visits, acupuncture  Baseline Hemoglobin: 7 g/dl without chronic transfusions  Hydroxyurea use: Yes  H/O blood transfusions: Yes, several (iron overload) Most recent 11/20/2021  H/O Transfusion Reactions: no  Antibodies:none  H/O Acute Chest Syndrome: Yes  Last episode:9/05/22 (previously 4/26/21, 10/2019)   ICU/intubation: not with 9/2022 admission  H/O Stroke: Yes (managed with chronic transfusions in the past, stopped late Spring 2020)  H/O VTE: Yes (2/2021)  H/O Cholecystectomy or Splenectomy: no  H/O Asthma, Pulm HTN, AVN, Leg Ulcers, Nephropathy, Retinopathy, etc: Iron overload, asthma, chronic lung disease, physical limitations from early stroke    ---------------------------------------  Jennifer Cervantes's Goals (did not discuss today)    1-3 month goal:  Pursue living independently in an apartment    6 month goal:      12 month goal:      Disease-specific goal(s):  Decrease frequency of ED visits. Decrease opioid qty per weekly refill. She'd like to get down to 5 tablets per week       Current Outpatient Medications   Medication Sig Dispense Refill    albuterol  (PROVENTIL) (2.5 MG/3ML) 0.083% neb solution Take 2 vials (5 mg) by nebulization every 6 hours as needed for shortness of breath or wheezing. 90 mL 3    aspirin (ASA) 81 MG chewable tablet Take 1 tablet (81 mg) by mouth 2 times daily 60 tablet 11    budesonide-formoterol (SYMBICORT) 160-4.5 MCG/ACT Inhaler Inhale 2 puffs twice daily plus 1-2 puffs as needed. May use up to 12 puffs per day. 20.4 g 11    deferasirox (JADENU) 360 MG tablet Take 4 tablets (1,440 mg) by mouth daily. 120 tablet 11    Deferiprone, Twice Daily, 1000 MG TABS Take 2,000 mg by mouth 2 times daily. 150 tablet 3    diphenhydrAMINE (BENADRYL) 50 MG capsule Administer 12  hours pre - IV contrast injection and 2 hours prior to contrast. Patient must have a . 2 capsule 0    EPINEPHrine (ANY BX GENERIC EQUIV) 0.3 MG/0.3ML injection 2-pack Inject 0.3 mLs (0.3 mg) into the muscle as needed for anaphylaxis. 1 each 1    gabapentin (NEURONTIN) 300 MG capsule Take 1 capsule (300 mg) by mouth 3 times daily. Take PO 1 pill in evening (300 mg) TID to start. If tolerated, increase by 300 mg in morning or lunch every few days, updating your doctor's office in time to get refills. The maximum dose is 900 mg TID. (Patient taking differently: Take 600 mg by mouth at bedtime. Take PO 1 pill in evening (300 mg) TID to start. If tolerated, increase by 300 mg in morning or lunch every few days, updating your doctor's office in time to get refills. The maximum dose is 900 mg TID.) 90 capsule 1    hydroxyurea (HYDREA) 500 MG capsule Take 6 capsules (3,000 mg) by mouth daily 180 capsule 11    ibuprofen (ADVIL/MOTRIN) 800 MG tablet Take 800 mg by mouth every 8 hours as needed for moderate pain      melatonin 5 MG tablet Take 1 tablet (5 mg) by mouth nightly as needed for sleep 30 tablet 1    methocarbamol (ROBAXIN) 750 MG tablet Take 1 tablet (750 mg) by mouth 4 times daily as needed for muscle spasms (during sickle pain crises. Okay to take scheduled while in  pain). 60 tablet 1    methylPREDNISolone (MEDROL) 32 MG tablet Take 1 tab 12 hours before appointment and 1 tab 2 hours prior to the procedure with IV contrast. 2 tablet 0    naloxone (NARCAN) 4 MG/0.1ML nasal spray Spray 4 mg into one nostril alternating nostrils as needed for opioid reversal. every 2-3 minutes until assistance arrives 0.2 mL 0    ondansetron (ZOFRAN ODT) 8 MG ODT tab Take 1 tablet (8 mg) by mouth every 8 hours as needed for nausea 40 tablet 4    oxyCODONE IR (ROXICODONE) 10 MG tablet Take 1 tablet (10 mg) by mouth every 6 hours as needed for severe pain or breakthrough pain (must last 2 weeks). Goal 4 per day. Max 6 per day. 20 tablet 0    pantoprazole (PROTONIX) 40 MG EC tablet Take 1 tablet (40 mg) by mouth daily. 30 tablet 0     Past Medical History  Past Medical History:   Diagnosis Date    Anxiety     Bleeding disorder (H)     Blood clotting disorder (H)     Cerebral infarction (H) 2015    Cognitive developmental delay     low IQ. 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 side (H)     right hand contractures    Iron overload due to repeated red blood cell transfusions     Migraines     Multiple subsegmental pulmonary emboli without acute cor pulmonale (H) 02/01/2021    Oppositional defiant behavior     Presence of intrauterine contraceptive device 2/18/2020    Superficial venous thrombosis of arm, right 03/25/2021    Uncomplicated asthma      Past Surgical History:   Procedure Laterality Date    AS INSERT TUNNELED CV 2 CATH W/O PORT/PUMP      CHOLECYSTECTOMY      CV RIGHT HEART CATH MEASUREMENTS RECORDED N/A 11/18/2021    Procedure: Right Heart Cath;  Surgeon: Jackson Stauffer MD;  Location:  [facility]ATH [facility]    INSERT PORT VASCULAR ACCESS Left 4/21/2021    Procedure: INSERTION, VASCULAR ACCESS PORT (NOT SURE ON SIDE UNTIL REMOVAL);  Surgeon: Rajan More,  MD;  Location: [facility] OR    IR CHEST PORT PLACEMENT > 5 YRS OF AGE  4/21/2021    IR CVC NON TUNNEL LINE REMOVAL  5/6/2021    IR CVC NON TUNNEL PLACEMENT > 5 YRS  04/07/2020    IR CVC NON TUNNEL PLACEMENT > 5 YRS  4/30/2021    IR CVC NON TUNNEL PLACEMENT > 5 YRS  9/7/2022    IR PORT REMOVAL LEFT  4/21/2021    REMOVE PORT VASCULAR ACCESS Left 4/21/2021    Procedure: REMOVAL, VASCULAR ACCESS PORT LEFT;  Surgeon: Rajan Mroe MD;  Location: [facility] OR    REPAIR TENDON ELBOW Right 10/02/2019    Procedure: Right Elbow Flexor Lengthening, Flexor Pronator Slide Of Wrist and Finger, Thumb Adductor Lengthening;  Surgeon: Anai Franco MD;  Location: [facility] OR    TONSILLECTOMY Bilateral 10/02/2019    Procedure: Bilateral Tonsillectomy;  Surgeon: Farhana Guy MD;  Location: [facility] OR    ZZC BREAST REDUCTION (INCLUDES LIPO) TIER 3 Bilateral 04/23/2019     Allergies   Allergen Reactions    Contrast Dye Angioedema     Hives and breathing issues    Fish-Derived Products Hives    Seafood Hives    Adhesive Tape Hives     Primipore dressing causes hives    Gadolinium     Iodinated Contrast Media      Social History   Social History     Tobacco Use    Smoking status: Never     Passive exposure: Never    Smokeless tobacco: Never   Substance Use Topics    Alcohol use: Not Currently     Alcohol/week: 0.0 standard drinks of alcohol    Drug use: Never   Past medical history and social history were reviewed.    Physical Examination:  LMP  (LMP Unknown)      Wt Readings from Last 10 Encounters:   11/20/24 75.2 kg (165 lb 11.2 oz)   11/15/24 74.8 kg (165 lb)   11/15/24 75.2 kg (165 lb 11.2 oz)   11/14/24 72.6 kg (160 lb)   11/13/24 72.6 kg (160 lb)   11/09/24 72.1 kg (159 lb)   11/08/24 72.5 kg (159 lb 14.4 oz)   11/03/24 70.3 kg (155 lb)   11/01/24 70.3 kg (155 lb)   10/31/24 71.7 kg (158 lb)     General: Pleasant female, NAD  Eyes: EOMI, PERRL  ENT: oral mucosa moist, pink. No erythema or tonsillar exudate. No cervical  lymphadenopathy.  Respiratory: CTA bilaterally, no wheezes or rhonchi  Ext: no peripheral edema  Neurologic: chronic contracture of right arm and wrist. Steady gait. A/O x 4.  Skin: No rashes, petechiae, or bruising noted on exposed skin.   Laboratory Data:  Recent Results (from the past 240 hours)   Basic metabolic panel    Collection Time: 11/12/24  2:33 PM   Result Value Ref Range    Sodium 141 135 - 145 mmol/L    Potassium 3.5 3.4 - 5.3 mmol/L    Chloride 112 (H) 98 - 107 mmol/L    Carbon Dioxide (CO2) 21 (L) 22 - 29 mmol/L    Anion Gap 8 7 - 15 mmol/L    Urea Nitrogen 8.4 6.0 - 20.0 mg/dL    Creatinine 0.51 0.51 - 0.95 mg/dL    GFR Estimate >90 >60 mL/min/1.73m2    Calcium 7.5 (L) 8.8 - 10.4 mg/dL    Glucose 81 70 - 99 mg/dL   Reticulocyte count    Collection Time: 11/12/24  2:33 PM   Result Value Ref Range    % Reticulocyte 17.3 (H) 0.5 - 2.0 %    Absolute Reticulocyte 0.432 (H) 0.025 - 0.095 10e6/uL   CBC with platelets and differential    Collection Time: 11/12/24  2:33 PM   Result Value Ref Range    WBC Count 11.7 (H) 4.0 - 11.0 10e3/uL    RBC Count 2.50 (L) 3.80 - 5.20 10e6/uL    Hemoglobin 7.3 (L) 11.7 - 15.7 g/dL    Hematocrit 21.3 (L) 35.0 - 47.0 %    MCV 85 78 - 100 fL    MCH 29.2 26.5 - 33.0 pg    MCHC 34.3 31.5 - 36.5 g/dL    RDW 22.5 (H) 10.0 - 15.0 %    Platelet Count 417 150 - 450 10e3/uL    % Neutrophils 71 %    % Lymphocytes 14 %    % Monocytes 7 %    % Eosinophils 5 %    % Basophils 2 %    % Immature Granulocytes 0 %    NRBCs per 100 WBC 2 (H) <1 /100    Absolute Neutrophils 8.4 (H) 1.6 - 8.3 10e3/uL    Absolute Lymphocytes 1.7 0.8 - 5.3 10e3/uL    Absolute Monocytes 0.9 0.0 - 1.3 10e3/uL    Absolute Eosinophils 0.6 0.0 - 0.7 10e3/uL    Absolute Basophils 0.2 0.0 - 0.2 10e3/uL    Absolute Immature Granulocytes 0.1 <=0.4 10e3/uL    Absolute NRBCs 0.2 10e3/uL   Hepatic panel    Collection Time: 11/12/24  2:33 PM   Result Value Ref Range    Protein Total 6.4 6.4 - 8.3 g/dL    Albumin 3.7 3.5 - 5.2  g/dL    Bilirubin Total 1.6 (H) <=1.2 mg/dL    Alkaline Phosphatase 55 40 - 150 U/L    AST 37 0 - 45 U/L    ALT 29 0 - 50 U/L    Bilirubin Direct 0.27 0.00 - 0.30 mg/dL   UA with Microscopic reflex to Culture    Collection Time: 11/12/24  4:57 PM    Specimen: Urine, Midstream   Result Value Ref Range    Color Urine Yellow Colorless, Straw, Light Yellow, Yellow    Appearance Urine Clear Clear    Glucose Urine Negative Negative mg/dL    Bilirubin Urine Negative Negative    Ketones Urine Negative Negative mg/dL    Specific Gravity Urine 1.011 1.003 - 1.035    Blood Urine Negative Negative    pH Urine 7.5 (H) 5.0 - 7.0    Protein Albumin Urine Negative Negative mg/dL    Urobilinogen Urine 3.0 (A) Normal, 2.0 mg/dL    Nitrite Urine Negative Negative    Leukocyte Esterase Urine Negative Negative    RBC Urine <1 <=2 /HPF    WBC Urine 2 <=5 /HPF    Squamous Epithelials Urine <1 <=1 /HPF   Urine Culture    Collection Time: 11/12/24  4:57 PM    Specimen: Urine, Midstream   Result Value Ref Range    Culture <10,000 CFU/mL Mixture of Urogenital Heena    HCG qualitative urine    Collection Time: 11/12/24  4:57 PM   Result Value Ref Range    hCG Urine Qualitative Negative Negative   EKG 12-lead, tracing only    Collection Time: 11/13/24  2:53 PM   Result Value Ref Range    Systolic Blood Pressure  mmHg    Diastolic Blood Pressure  mmHg    Ventricular Rate 98 BPM    Atrial Rate 98 BPM    MT Interval 148 ms    QRS Duration 76 ms     ms    QTc 474 ms    P Axis 75 degrees    R AXIS 30 degrees    T Axis 22 degrees    Interpretation ECG       Sinus rhythm  Normal ECG  Unconfirmed report - interpretation of this ECG is computer generated - see medical record for final interpretation  Confirmed by - EMERGENCY ROOM, PHYSICIAN ([id number]),  ELVA CARVER ([id number]) on 11/14/2024 9:45:10 AM     Comprehensive metabolic panel    Collection Time: 11/13/24  3:31 PM   Result Value Ref Range    Sodium 140 135 - 145 mmol/L    Potassium 4.1  3.4 - 5.3 mmol/L    Carbon Dioxide (CO2) 21 (L) 22 - 29 mmol/L    Anion Gap 13 7 - 15 mmol/L    Urea Nitrogen 11.0 6.0 - 20.0 mg/dL    Creatinine 0.53 0.51 - 0.95 mg/dL    GFR Estimate >90 >60 mL/min/1.73m2    Calcium 9.2 8.8 - 10.4 mg/dL    Chloride 106 98 - 107 mmol/L    Glucose 92 70 - 99 mg/dL    Alkaline Phosphatase 65 40 - 150 U/L    AST 38 0 - 45 U/L    ALT 31 0 - 50 U/L    Protein Total 7.8 6.4 - 8.3 g/dL    Albumin 4.5 3.5 - 5.2 g/dL    Bilirubin Total 2.1 (H) <=1.2 mg/dL   Troponin T, High Sensitivity    Collection Time: 11/13/24  3:31 PM   Result Value Ref Range    Troponin T, High Sensitivity <6 <=14 ng/L   Reticulocyte count    Collection Time: 11/13/24  3:31 PM   Result Value Ref Range    % Reticulocyte 19.7 (H) 0.5 - 2.0 %    Absolute Reticulocyte 0.484 (H) 0.025 - 0.095 10e6/uL   CBC with platelets and differential    Collection Time: 11/13/24  3:31 PM   Result Value Ref Range    WBC Count 14.3 (H) 4.0 - 11.0 10e3/uL    RBC Count 2.62 (L) 3.80 - 5.20 10e6/uL    Hemoglobin 7.8 (L) 11.7 - 15.7 g/dL    Hematocrit 22.6 (L) 35.0 - 47.0 %    MCV 86 78 - 100 fL    MCH 29.8 26.5 - 33.0 pg    MCHC 34.5 31.5 - 36.5 g/dL    RDW 23.8 (H) 10.0 - 15.0 %    Platelet Count 452 (H) 150 - 450 10e3/uL    % Neutrophils 73 %    % Lymphocytes 15 %    % Monocytes 7 %    % Eosinophils 4 %    % Basophils 2 %    % Immature Granulocytes 1 %    NRBCs per 100 WBC 2 (H) <1 /100    Absolute Neutrophils 10.5 (H) 1.6 - 8.3 10e3/uL    Absolute Lymphocytes 2.1 0.8 - 5.3 10e3/uL    Absolute Monocytes 0.9 0.0 - 1.3 10e3/uL    Absolute Eosinophils 0.5 0.0 - 0.7 10e3/uL    Absolute Basophils 0.3 (H) 0.0 - 0.2 10e3/uL    Absolute Immature Granulocytes 0.1 <=0.4 10e3/uL    Absolute NRBCs 0.3 10e3/uL   Influenza A/B, RSV, & SARS-CoV2 PCR (COVID-19) Nasopharyngeal    Collection Time: 11/13/24  3:52 PM    Specimen: Nasopharyngeal; Swab   Result Value Ref Range    Influenza A PCR Negative Negative    Influenza B PCR Negative Negative    RSV PCR  Negative Negative    SARS CoV2 PCR Negative Negative   Comprehensive metabolic panel    Collection Time: 11/14/24  9:42 PM   Result Value Ref Range    Sodium 142 135 - 145 mmol/L    Potassium 3.7 3.4 - 5.3 mmol/L    Carbon Dioxide (CO2) 25 22 - 29 mmol/L    Anion Gap 10 7 - 15 mmol/L    Urea Nitrogen 9.2 6.0 - 20.0 mg/dL    Creatinine 0.64 0.51 - 0.95 mg/dL    GFR Estimate >90 >60 mL/min/1.73m2    Calcium 8.7 (L) 8.8 - 10.4 mg/dL    Chloride 107 98 - 107 mmol/L    Glucose 96 70 - 99 mg/dL    Alkaline Phosphatase 59 40 - 150 U/L    AST 37 0 - 45 U/L    ALT 28 0 - 50 U/L    Protein Total 7.0 6.4 - 8.3 g/dL    Albumin 4.2 3.5 - 5.2 g/dL    Bilirubin Total 2.0 (H) <=1.2 mg/dL   CBC with platelets and differential    Collection Time: 11/14/24  9:42 PM   Result Value Ref Range    WBC Count 13.3 (H) 4.0 - 11.0 10e3/uL    RBC Count 2.35 (L) 3.80 - 5.20 10e6/uL    Hemoglobin 7.2 (L) 11.7 - 15.7 g/dL    Hematocrit 20.2 (L) 35.0 - 47.0 %    MCV 86 78 - 100 fL    MCH 30.6 26.5 - 33.0 pg    MCHC 35.6 31.5 - 36.5 g/dL    RDW 23.9 (H) 10.0 - 15.0 %    Platelet Count 430 150 - 450 10e3/uL    % Neutrophils 69 %    % Lymphocytes 19 %    % Monocytes 7 %    % Eosinophils 4 %    % Basophils 2 %    % Immature Granulocytes 0 %    NRBCs per 100 WBC 2 (H) <1 /100    Absolute Neutrophils 9.2 (H) 1.6 - 8.3 10e3/uL    Absolute Lymphocytes 2.5 0.8 - 5.3 10e3/uL    Absolute Monocytes 0.9 0.0 - 1.3 10e3/uL    Absolute Eosinophils 0.5 0.0 - 0.7 10e3/uL    Absolute Basophils 0.2 0.0 - 0.2 10e3/uL    Absolute Immature Granulocytes 0.0 <=0.4 10e3/uL    Absolute NRBCs 0.2 10e3/uL   Comprehensive metabolic panel    Collection Time: 11/15/24  8:02 PM   Result Value Ref Range    Sodium 140 135 - 145 mmol/L    Potassium 4.1 3.4 - 5.3 mmol/L    Carbon Dioxide (CO2) 23 22 - 29 mmol/L    Anion Gap 11 7 - 15 mmol/L    Urea Nitrogen 7.2 6.0 - 20.0 mg/dL    Creatinine 0.54 0.51 - 0.95 mg/dL    GFR Estimate >90 >60 mL/min/1.73m2    Calcium 8.7 (L) 8.8 - 10.4  mg/dL    Chloride 106 98 - 107 mmol/L    Glucose 95 70 - 99 mg/dL    Alkaline Phosphatase 61 40 - 150 U/L    AST 40 0 - 45 U/L    ALT 33 0 - 50 U/L    Protein Total 7.1 6.4 - 8.3 g/dL    Albumin 4.3 3.5 - 5.2 g/dL    Bilirubin Total 2.1 (H) <=1.2 mg/dL   Lipase    Collection Time: 11/15/24  8:02 PM   Result Value Ref Range    Lipase 31 13 - 60 U/L   Reticulocyte count    Collection Time: 11/15/24  8:02 PM   Result Value Ref Range    % Reticulocyte      Absolute Reticulocyte     CBC with platelets and differential    Collection Time: 11/15/24  8:02 PM   Result Value Ref Range    WBC Count 11.6 (H) 4.0 - 11.0 10e3/uL    RBC Count 2.31 (L) 3.80 - 5.20 10e6/uL    Hemoglobin 7.0 (L) 11.7 - 15.7 g/dL    Hematocrit 19.4 (L) 35.0 - 47.0 %    MCV 84 78 - 100 fL    MCH 30.3 26.5 - 33.0 pg    MCHC 36.1 31.5 - 36.5 g/dL    RDW 23.9 (H) 10.0 - 15.0 %    Platelet Count 408 150 - 450 10e3/uL    % Neutrophils 68 %    % Lymphocytes 18 %    % Monocytes 8 %    % Eosinophils 4 %    % Basophils 1 %    % Immature Granulocytes 0 %    NRBCs per 100 WBC 1 (H) <1 /100    Absolute Neutrophils 7.9 1.6 - 8.3 10e3/uL    Absolute Lymphocytes 2.1 0.8 - 5.3 10e3/uL    Absolute Monocytes 0.9 0.0 - 1.3 10e3/uL    Absolute Eosinophils 0.5 0.0 - 0.7 10e3/uL    Absolute Basophils 0.2 0.0 - 0.2 10e3/uL    Absolute Immature Granulocytes 0.0 <=0.4 10e3/uL    Absolute NRBCs 0.2 10e3/uL   UA with Microscopic reflex to Culture    Collection Time: 11/15/24 10:31 PM    Specimen: Urine, Midstream   Result Value Ref Range    Color Urine Light Yellow Colorless, Straw, Light Yellow, Yellow    Appearance Urine Clear Clear    Glucose Urine Negative Negative mg/dL    Bilirubin Urine Negative Negative    Ketones Urine Negative Negative mg/dL    Specific Gravity Urine 1.010 1.003 - 1.035    Blood Urine Negative Negative    pH Urine 6.5 5.0 - 7.0    Protein Albumin Urine Negative Negative mg/dL    Urobilinogen Urine 2.0 Normal, 2.0 mg/dL    Nitrite Urine Negative  Negative    Leukocyte Esterase Urine Negative Negative    Mucus Urine Present (A) None Seen /LPF    RBC Urine 0 <=2 /HPF    WBC Urine 1 <=5 /HPF    Squamous Epithelials Urine 1 <=1 /HPF   Comprehensive metabolic panel    Collection Time: 11/21/24 12:48 AM   Result Value Ref Range    Sodium 137 135 - 145 mmol/L    Potassium 4.0 3.4 - 5.3 mmol/L    Carbon Dioxide (CO2) 23 22 - 29 mmol/L    Anion Gap 10 7 - 15 mmol/L    Urea Nitrogen 10.3 6.0 - 20.0 mg/dL    Creatinine 0.74 0.51 - 0.95 mg/dL    GFR Estimate >90 >60 mL/min/1.73m2    Calcium 8.6 (L) 8.8 - 10.4 mg/dL    Chloride 104 98 - 107 mmol/L    Glucose 91 70 - 99 mg/dL    Alkaline Phosphatase 57 40 - 150 U/L    AST 45 0 - 45 U/L    ALT 33 0 - 50 U/L    Protein Total 6.9 6.4 - 8.3 g/dL    Albumin 4.2 3.5 - 5.2 g/dL    Bilirubin Total 2.1 (H) <=1.2 mg/dL   CBC with platelets and differential    Collection Time: 11/21/24 12:48 AM   Result Value Ref Range    WBC Count 10.6 4.0 - 11.0 10e3/uL    RBC Count 2.51 (L) 3.80 - 5.20 10e6/uL    Hemoglobin 7.7 (L) 11.7 - 15.7 g/dL    Hematocrit 21.5 (L) 35.0 - 47.0 %    MCV 86 78 - 100 fL    MCH 30.7 26.5 - 33.0 pg    MCHC 35.8 31.5 - 36.5 g/dL    RDW 22.2 (H) 10.0 - 15.0 %    Platelet Count 419 150 - 450 10e3/uL    % Neutrophils 61 %    % Lymphocytes 22 %    % Monocytes 9 %    % Eosinophils 6 %    % Basophils 2 %    % Immature Granulocytes 1 %    NRBCs per 100 WBC 1 (H) <1 /100    Absolute Neutrophils 6.5 1.6 - 8.3 10e3/uL    Absolute Lymphocytes 2.3 0.8 - 5.3 10e3/uL    Absolute Monocytes 1.0 0.0 - 1.3 10e3/uL    Absolute Eosinophils 0.6 0.0 - 0.7 10e3/uL    Absolute Basophils 0.2 0.0 - 0.2 10e3/uL    Absolute Immature Granulocytes 0.1 <=0.4 10e3/uL    Absolute NRBCs 0.1 10e3/uL     I reviewed the above labs today.    Assessment and Plan:  1. Sickle Cell HgbSS Disease  2. Acute pain crises  3. Chronic Pain  4. 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 History of CVA  7.  Hearing loss  8. Nausea/vomiting       Jennifer is seen today for routine follow-up.  Most recently she has decided to make an effort to completely stop oral oxycodone use.  Rather than receiving a weekly refill we have provided a 2-week supply of oxycodone with a goal of discontinuing the home oxycodone use completely after this point.  We discussed this in additional detail today.  Her next refill would be due 12/2/2024.  If at that time she decides she is not yet ready to stop her oxycodone use it would be reasonable to continue with her weekly refills of 10 tablets.  Alternatively we can also attempt to decrease her total quantity to 5 tablets/week.  She feels comfortable with this plan and making a game day decision next week depending on how she is feeling.  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  I do not think a chest x-ray or viral swabs are clinically indicated at this point.    Regarding decreasing her oxycodone use, we did discuss that this would only be a reasonable as long as she does not show an uptrend in her ED utilization.  She reiterates that her goal is to stay out of the hospital is much as possible and only utilize the infusion center if needed for pain flares.  We will continue to watch this trend over the next few weeks.    She is planning to either exchange or remove her Nexplanon in the near future.  She inquires about alternative birth control options which would completely eliminate her menstrual period.  I will defer this decision to her PCP.    Deferiprone was added back to her iron chelation regimen. She had been off of this for a few months due to apparent confusion with the pharmacy over taking dual treatment with Jadenu. She is scheduled for a Ferriscan 12/16/24.    -------------------------------------  Plan:  -Continue Hydrea to 3000mg daily to  help lessen frequency of sickle cell pain.   -Continue monthly crizanlizumab infusions  -Continue to reassess plan for home oxycodone use. Next refill due 12/2. If she elects to continue oxycodone, would recommend continuing with 10 tablets/week or reduce to 5 tabs/week.   -She can self-reduce infusion days/week and we will continue to keep the cap at 2/week for now.   -Continue infusion center visits limited to two times per week (Mondays and Fridays ideally although still needs to call to request). Continue diligent home management with current medications, heat, rest, compression, warm baths.   -If unable to manage at home can go to ED  with continued plan to use IV Dilaudid and take a break from ketamine (10/2/23). No PCA use and goal for any admission would still be to discharge by 5 days or less  -EGD for evaluation of ongoing n/v and abdominal pain, scheduled in November.  CT abdomen/pelvis also due as ordered by Dr. Case.  Complete stool studies for calprotectin and H. pylori.  -Continue metoclopramide 5 mg TID PRN  - Continue Jadenu for iron overload and deferiprone. Due for Ferriscan in November 2024.   -Continue aspirin BID  -Due at some point for Meningitis B booster.  -RTC with me in 3 weeks    Patricia Mantilla CNP    ---  60 minutes spent on the date of the encounter doing chart review, review of test results, interpretation of tests, patient visit, and documentation.      The longitudinal plan of care for the diagnosis(es)/condition(s) as documented were addressed during this visit. Due to the added complexity in care, I will continue to support Jennifer in the subsequent management and with ongoing continuity of care.

## 2024-11-22 NOTE — DISCHARGE INSTRUCTIONS
Continue your home medications and follow-up with your hematologist as soon as possible.  If your symptoms are worsening return to emergency department as we discussed.

## 2024-11-22 NOTE — ED PROVIDER NOTES
ED Provider Note  [facility]      History     Chief Complaint   Patient presents with    Sickle Cell Pain Crisis     Denies CP or SOB     HPI  Jennifer Cervantes is a 24 year old female with a history of sickle cell disease with a care plan in place and CVA who presents to the emergency department with sickle cell pain. Patient reports she is experiencing pain from the waist down most notably on her left leg.  She reports that she took her dose of oxycodone today and notes that she is only allowed to go to the infusion center twice a week which is why she is presenting today.  After continuing to talk with patient, patient reports she has decided that she wants to go home now and no longer wants pain management.  She denies shortness of breath.  She states she will talk to her primary/pain management doctor later today.    Acute Pain Crisis Treatment: (limiting IV dosing)  ER   Dilaudid 1 mg IV q1h up to 3 doses  Toradol 30 mg IV x1   Maintenance IV fluids with LR  Other: Zofran 8 mg IV PRN nausea    Past Medical History  Past Medical History:   Diagnosis Date    Anxiety     Bleeding disorder (H)     Blood clotting disorder (H)     Cerebral infarction (H) 2015    Cognitive developmental delay     low IQ. 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 side (H)     right hand contractures    Iron overload due to repeated red blood cell transfusions     Migraines     Multiple subsegmental pulmonary emboli without acute cor pulmonale (H) 02/01/2021    Oppositional defiant behavior     Presence of intrauterine contraceptive device 2/18/2020    Superficial venous thrombosis of arm, right 03/25/2021    Uncomplicated asthma      Past Surgical History:   Procedure Laterality Date    AS INSERT TUNNELED CV 2 CATH W/O PORT/PUMP      CHOLECYSTECTOMY      CV RIGHT HEART CATH MEASUREMENTS  RECORDED N/A 11/18/2021    Procedure: Right Heart Cath;  Surgeon: Jackson Stauffer MD;  Location:  HEART CARDIAC CATH LAB    INSERT PORT VASCULAR ACCESS Left 4/21/2021    Procedure: INSERTION, VASCULAR ACCESS PORT (NOT SURE ON SIDE UNTIL REMOVAL);  Surgeon: Rajan More MD;  Location: [facility] OR    IR CHEST PORT PLACEMENT > 5 YRS OF AGE  4/21/2021    IR CVC NON TUNNEL LINE REMOVAL  5/6/2021    IR CVC NON TUNNEL PLACEMENT > 5 YRS  04/07/2020    IR CVC NON TUNNEL PLACEMENT > 5 YRS  4/30/2021    IR CVC NON TUNNEL PLACEMENT > 5 YRS  9/7/2022    IR PORT REMOVAL LEFT  4/21/2021    REMOVE PORT VASCULAR ACCESS Left 4/21/2021    Procedure: REMOVAL, VASCULAR ACCESS PORT LEFT;  Surgeon: Rajan More MD;  Location: [facility] OR    REPAIR TENDON ELBOW Right 10/02/2019    Procedure: Right Elbow Flexor Lengthening, Flexor Pronator Slide Of Wrist and Finger, Thumb Adductor Lengthening;  Surgeon: Anai Franco MD;  Location: [facility] OR    TONSILLECTOMY Bilateral 10/02/2019    Procedure: Bilateral Tonsillectomy;  Surgeon: Farhana Guy MD;  Location: [facility] OR    ZZC BREAST REDUCTION (INCLUDES LIPO) TIER 3 Bilateral 04/23/2019     albuterol (PROVENTIL) (2.5 MG/3ML) 0.083% neb solution  aspirin (ASA) 81 MG chewable tablet  budesonide-formoterol (SYMBICORT) 160-4.5 MCG/ACT Inhaler  deferasirox (JADENU) 360 MG tablet  Deferiprone, Twice Daily, 1000 MG TABS  diphenhydrAMINE (BENADRYL) 50 MG capsule  EPINEPHrine (ANY BX GENERIC EQUIV) 0.3 MG/0.3ML injection 2-pack  gabapentin (NEURONTIN) 300 MG capsule  hydroxyurea (HYDREA) 500 MG capsule  ibuprofen (ADVIL/MOTRIN) 800 MG tablet  melatonin 5 MG tablet  methocarbamol (ROBAXIN) 750 MG tablet  methylPREDNISolone (MEDROL) 32 MG tablet  naloxone (NARCAN) 4 MG/0.1ML nasal spray  ondansetron (ZOFRAN ODT) 8 MG ODT tab  oxyCODONE IR (ROXICODONE) 10 MG tablet  pantoprazole (PROTONIX) 40 MG EC tablet      Allergies   Allergen Reactions    Contrast Dye Angioedema     Hives and breathing  "issues    Fish-Derived Products Hives    Seafood Hives    Adhesive Tape Hives     Primipore dressing causes hives    Gadolinium     Iodinated Contrast Media      Family History  Family History   Problem Relation Age of Onset    Sickle Cell Trait Mother     Hypertension Mother     Asthma Mother     Sickle Cell Trait Father     Glaucoma No family hx of     Macular Degeneration No family hx of     Diabetes No family hx of     Gout No family hx of      Social History   Social History     Tobacco Use    Smoking status: Never     Passive exposure: Never    Smokeless tobacco: Never   Substance Use Topics    Alcohol use: Not Currently     Alcohol/week: 0.0 standard drinks of alcohol    Drug use: Never      A medically appropriate review of systems was performed with pertinent positives and negatives noted in the HPI, and all other systems negative.    Physical Exam   BP: 121/72  Pulse: 51  Temp: 98.2  F (36.8  C)  Resp: 16  Height: 162.6 cm (5' 4\")  Weight: 72 kg (158 lb 11.2 oz)  SpO2: 95 %  Physical Exam  Vitals and nursing note reviewed.   Constitutional:       General: She is not in acute distress.     Appearance: Normal appearance. She is not diaphoretic.   HENT:      Head: Atraumatic.      Mouth/Throat:      Mouth: Mucous membranes are moist.   Eyes:      General: No scleral icterus.     Conjunctiva/sclera: Conjunctivae normal.   Cardiovascular:      Rate and Rhythm: Normal rate.      Heart sounds: Normal heart sounds.   Pulmonary:      Effort: No respiratory distress.      Breath sounds: Normal breath sounds.   Abdominal:      General: Abdomen is flat.   Musculoskeletal:      Cervical back: Neck supple.   Skin:     General: Skin is warm.      Findings: No rash.   Neurological:      Mental Status: She is alert.           ED Course, Procedures, & Data      Procedures                 No results found for any visits on 11/22/24.  Medications - No data to display  Labs Ordered and Resulted from Time of ED Arrival to Time " "of ED Departure - No data to display  No orders to display          Critical care was not performed.     Medical Decision Making  The patient's presentation was of moderate complexity (a chronic illness mild to moderate exacerbation, progression, or side effect of treatment).    The patient's evaluation involved:  review of external note(s) from 3+ sources (review of documentation from ED visit on 11/20/2024, 11/15/2024 and 11/14/2024)  review of 3+ test result(s) ordered prior to this encounter (the review of the most patient's recent labs on 11/20/2024)    The patient's management necessitated only low risk treatment.    Assessment & Plan    24-year-old female with a history of sickle cell disease, frequent ED presentation, presented complaining of left leg pain.  Shortly after arrival here upon my interviewing the patient she stated she was feeling better, and stated she changed her mind and did not feel she needed emergency evaluation or treatment.  States her symptoms are typical of multiple prior sickle cell pain crisis but she does not feel it is severe.  Her vital signs and physical exam are unremarkable and she was worked up recently in the ED on 11/20/2024.  I offered to enact her care plan and work her up further, however she continues to decline stating that she is \"feeling a little better\" and does not believe she needs emergent treatment or evaluation and request discharge.  We discussed the indications for emergency department return and follow-up.  Stable for discharge.      I have reviewed the nursing notes. I have reviewed the findings, diagnosis, plan and need for follow up with the patient.    Discharge Medication List as of 11/22/2024 12:19 PM          Final diagnoses:   Sickle cell pain crisis (H)   I, Farhana Young, am serving as a trained medical scribe to document services personally performed by Ifeanyi Valdez MD, based on the provider's statements to me.     I, Ifeanyi Valdez MD, was " physically present and have reviewed and verified the accuracy of this note documented by Farhana Young.     Ifeanyi Valdez MD  [facility] EMERGENCY DEPARTMENT  11/22/2024     Ifeanyi Valdez MD  11/22/24 [id number]

## 2024-11-25 ENCOUNTER — NURSE TRIAGE (OUTPATIENT)
Dept: ONCOLOGY | Facility: CLINIC | Age: 24
End: 2024-11-25
Payer: COMMERCIAL

## 2024-11-25 ENCOUNTER — PATIENT OUTREACH (OUTPATIENT)
Dept: CARE COORDINATION | Facility: CLINIC | Age: 24
End: 2024-11-25
Payer: COMMERCIAL

## 2024-11-25 ENCOUNTER — INFUSION THERAPY VISIT (OUTPATIENT)
Dept: TRANSPLANT | Facility: CLINIC | Age: 24
End: 2024-11-25
Attending: PEDIATRICS
Payer: COMMERCIAL

## 2024-11-25 ENCOUNTER — ONCOLOGY VISIT (OUTPATIENT)
Dept: ONCOLOGY | Facility: CLINIC | Age: 24
End: 2024-11-25
Attending: REGISTERED NURSE
Payer: COMMERCIAL

## 2024-11-25 VITALS
TEMPERATURE: 98.3 F | SYSTOLIC BLOOD PRESSURE: 120 MMHG | HEART RATE: 94 BPM | DIASTOLIC BLOOD PRESSURE: 79 MMHG | OXYGEN SATURATION: 94 % | RESPIRATION RATE: 16 BRPM

## 2024-11-25 DIAGNOSIS — D57.1 HB-SS DISEASE WITHOUT CRISIS (H): Primary | ICD-10-CM

## 2024-11-25 DIAGNOSIS — Z86.73 HISTORY OF STROKE: ICD-10-CM

## 2024-11-25 DIAGNOSIS — D57.1 HB-SS DISEASE WITHOUT CRISIS (H): ICD-10-CM

## 2024-11-25 DIAGNOSIS — G81.10 SPASTIC HEMIPLEGIA, UNSPECIFIED ETIOLOGY, UNSPECIFIED LATERALITY (H): ICD-10-CM

## 2024-11-25 DIAGNOSIS — D57.00 SICKLE CELL PAIN CRISIS (H): Primary | ICD-10-CM

## 2024-11-25 LAB
BASOPHILS # BLD AUTO: 0.3 10E3/UL (ref 0–0.2)
BASOPHILS NFR BLD AUTO: 2 %
EOSINOPHIL # BLD AUTO: 0.8 10E3/UL (ref 0–0.7)
EOSINOPHIL NFR BLD AUTO: 6 %
ERYTHROCYTE [DISTWIDTH] IN BLOOD BY AUTOMATED COUNT: 22.3 % (ref 10–15)
HCT VFR BLD AUTO: 23.3 % (ref 35–47)
HGB BLD-MCNC: 7.8 G/DL (ref 11.7–15.7)
IMM GRANULOCYTES # BLD: 0.1 10E3/UL
IMM GRANULOCYTES NFR BLD: 0 %
LYMPHOCYTES # BLD AUTO: 1.8 10E3/UL (ref 0.8–5.3)
LYMPHOCYTES NFR BLD AUTO: 13 %
MCH RBC QN AUTO: 28.1 PG (ref 26.5–33)
MCHC RBC AUTO-ENTMCNC: 33.5 G/DL (ref 31.5–36.5)
MCV RBC AUTO: 84 FL (ref 78–100)
MONOCYTES # BLD AUTO: 1.2 10E3/UL (ref 0–1.3)
MONOCYTES NFR BLD AUTO: 9 %
NEUTROPHILS # BLD AUTO: 9.5 10E3/UL (ref 1.6–8.3)
NEUTROPHILS NFR BLD AUTO: 69 %
NRBC # BLD AUTO: 0.1 10E3/UL
NRBC BLD AUTO-RTO: 1 /100
PLATELET # BLD AUTO: 467 10E3/UL (ref 150–450)
RBC # BLD AUTO: 2.78 10E6/UL (ref 3.8–5.2)
WBC # BLD AUTO: 13.6 10E3/UL (ref 4–11)

## 2024-11-25 PROCEDURE — 250N000013 HC RX MED GY IP 250 OP 250 PS 637: Performed by: PEDIATRICS

## 2024-11-25 PROCEDURE — 85041 AUTOMATED RBC COUNT: CPT | Performed by: PEDIATRICS

## 2024-11-25 PROCEDURE — 250N000011 HC RX IP 250 OP 636: Performed by: PEDIATRICS

## 2024-11-25 PROCEDURE — 258N000003 HC RX IP 258 OP 636: Performed by: PEDIATRICS

## 2024-11-25 PROCEDURE — 36591 DRAW BLOOD OFF VENOUS DEVICE: CPT | Performed by: PEDIATRICS

## 2024-11-25 PROCEDURE — 85004 AUTOMATED DIFF WBC COUNT: CPT | Performed by: PEDIATRICS

## 2024-11-25 RX ORDER — KETOROLAC TROMETHAMINE 30 MG/ML
30 INJECTION, SOLUTION INTRAMUSCULAR; INTRAVENOUS ONCE
Status: COMPLETED | OUTPATIENT
Start: 2024-11-25 | End: 2024-11-25

## 2024-11-25 RX ORDER — ONDANSETRON 2 MG/ML
8 INJECTION INTRAMUSCULAR; INTRAVENOUS EVERY 6 HOURS PRN
Status: DISCONTINUED | OUTPATIENT
Start: 2024-11-25 | End: 2024-11-25 | Stop reason: HOSPADM

## 2024-11-25 RX ORDER — HEPARIN SODIUM (PORCINE) LOCK FLUSH IV SOLN 100 UNIT/ML 100 UNIT/ML
5 SOLUTION INTRAVENOUS
OUTPATIENT
Start: 2024-11-25

## 2024-11-25 RX ORDER — DIPHENHYDRAMINE HCL 25 MG
50 CAPSULE ORAL
Start: 2025-01-01

## 2024-11-25 RX ORDER — HEPARIN SODIUM (PORCINE) LOCK FLUSH IV SOLN 100 UNIT/ML 100 UNIT/ML
5 SOLUTION INTRAVENOUS ONCE
Status: COMPLETED | OUTPATIENT
Start: 2024-11-25 | End: 2024-11-25

## 2024-11-25 RX ORDER — ONDANSETRON 4 MG/1
8 TABLET, FILM COATED ORAL
Start: 2025-01-01

## 2024-11-25 RX ORDER — DIPHENHYDRAMINE HCL 25 MG
50 CAPSULE ORAL
Status: COMPLETED | OUTPATIENT
Start: 2024-11-25 | End: 2024-11-25

## 2024-11-25 RX ORDER — HEPARIN SODIUM (PORCINE) LOCK FLUSH IV SOLN 100 UNIT/ML 100 UNIT/ML
5 SOLUTION INTRAVENOUS
OUTPATIENT
Start: 2025-01-01

## 2024-11-25 RX ORDER — ONDANSETRON 2 MG/ML
8 INJECTION INTRAMUSCULAR; INTRAVENOUS EVERY 6 HOURS PRN
Start: 2025-01-01

## 2024-11-25 RX ORDER — HEPARIN SODIUM,PORCINE 10 UNIT/ML
5 VIAL (ML) INTRAVENOUS
OUTPATIENT
Start: 2024-11-25

## 2024-11-25 RX ORDER — HEPARIN SODIUM,PORCINE 10 UNIT/ML
5 VIAL (ML) INTRAVENOUS
OUTPATIENT
Start: 2025-01-01

## 2024-11-25 RX ORDER — KETOROLAC TROMETHAMINE 30 MG/ML
30 INJECTION, SOLUTION INTRAMUSCULAR; INTRAVENOUS ONCE
Start: 2025-01-01 | End: 2025-01-01

## 2024-11-25 RX ADMIN — HYDROMORPHONE HYDROCHLORIDE 1 MG: 1 INJECTION, SOLUTION INTRAMUSCULAR; INTRAVENOUS; SUBCUTANEOUS at 12:39

## 2024-11-25 RX ADMIN — HYDROMORPHONE HYDROCHLORIDE 1 MG: 1 INJECTION, SOLUTION INTRAMUSCULAR; INTRAVENOUS; SUBCUTANEOUS at 14:55

## 2024-11-25 RX ADMIN — ONDANSETRON 8 MG: 2 INJECTION INTRAMUSCULAR; INTRAVENOUS at 13:02

## 2024-11-25 RX ADMIN — Medication 5 ML: at 15:42

## 2024-11-25 RX ADMIN — SODIUM CHLORIDE, POTASSIUM CHLORIDE, SODIUM LACTATE AND CALCIUM CHLORIDE 1000 ML: 600; 310; 30; 20 INJECTION, SOLUTION INTRAVENOUS at 12:40

## 2024-11-25 RX ADMIN — KETOROLAC TROMETHAMINE 30 MG: 30 INJECTION, SOLUTION INTRAMUSCULAR; INTRAVENOUS at 12:39

## 2024-11-25 RX ADMIN — HYDROMORPHONE HYDROCHLORIDE 1 MG: 1 INJECTION, SOLUTION INTRAMUSCULAR; INTRAVENOUS; SUBCUTANEOUS at 13:47

## 2024-11-25 RX ADMIN — DIPHENHYDRAMINE HYDROCHLORIDE 50 MG: 25 CAPSULE ORAL at 12:39

## 2024-11-25 ASSESSMENT — PAIN SCALES - GENERAL: PAINLEVEL_OUTOF10: EXTREME PAIN (9)

## 2024-11-25 NOTE — PROGRESS NOTES
Ambulatory Care Management- Transportation Support  Specialty SW - [facility]     Data/Intervention:  Patient Name:    Jennifer Cervantes     /Age: 2000 (24 year old)     [facility] team member assisting Specialty SW with transportation request.      Assessment:  CHW/CTA called [employer] to arrange medical appointment transportation in conjunction with patient's insurance. A ride was already set up for today which was made on Friday so the will call ride home is already in place. Patient just needs to indicate she needs to active the ride home if she is not going to use the ride to the clinic.     Taxi company: [employer]    Patient will need to call above taxi company at phone number: [phone] when ready for return ride home.      Plan:  Patient is aware of the transportation plan.  available to assist with any other needs.      Maritza Vick MA

## 2024-11-25 NOTE — TELEPHONE ENCOUNTER
Oncology Nurse Triage - Sickle Cell Pain Crisis:    Situation: Jennifer  calling about Sickle Cell Pain Crisis, requesting to be added on for IV fluids and pain medicine    Background:   Patient's last infusion was 11/20/24 in infusion and ED.   Pt went to ED on 11/22/24, but symptoms had improved, so she did not receive a work up or IVF/pain meds.   Last clinic visit date: 10/31/24; sees Patricia Mantilla today  Does patient have active treatment plan? Yes    Assessment of Symptoms:  Onset/Duration of symptoms: 4 day    Is it typical sickle cell pain? Yes  Location: left arm and left leg  Character: Sharp and achy  Intensity: 9/10    Any radiation of pain, numbness, tingling, weakness, warmth, swelling, discoloration of arms or legs?No    Fever? No    Chest Pain Present: No    Shortness of breath: No    Other home therapies tried: HEAT/HEATING PAD and WARM BATH     Last home medication taken and when: last night Oxycodone and IBU    Any Refills Needed?: No    Does patient have transportation & length of time to get to clinic: Yes- 15 minutes    Recommendations:   Secure chat to Patricia Mantilla, updated on 11/22 ED visit. Patricia vargased pt to come in for infusion today.   Added to infusion wait list.   0938 call to pt to offer 1300pm infusion appt in BMT infusion. Pt accepting of time. Informed of message out to Patricia Mantilla to see if she can see pt in infusion. Pt requesting ride home, confirmed she has ride to appts.   0941 message sent to scheduling.   0942 message sent to  to help arrange transport home after infusion appt.

## 2024-11-25 NOTE — PROGRESS NOTES
Infusion Nursing Note:  Jennifer Cervantes presents today for add-on infusion.    Patient seen by provider today: Yes: Patricia SANTIAGO   present during visit today: Not Applicable.    Note: Patient received dilaudid x3, toradol, benadryl, zofran (OK to proceed per finance), and 1 L LR bolus per treatment plan with some relief. Patient stable prior to discharge.      Intravenous Access:  Implanted Port.    Treatment Conditions:  Results reviewed, labs MET treatment parameters, ok to proceed with treatment.      Post Infusion Assessment:  Patient tolerated infusion without incident.       Discharge Plan:   Patient and/or family verbalized understanding of discharge instructions and all questions answered.      Vicenta Boone RN

## 2024-11-25 NOTE — Clinical Note
11/25/2024      Jennifer Cervantes  [address]  [facility] [address]      Dear Colleague,    Thank you for referring your patient, Jennifer Cervantes, to the [facility]. Please see a copy of my visit note below.    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 She had multiple thromboembolic events in 2021 despite adherent anticoagulation use (though warfarin was perpetually low) and there are concerns for chronic thromboembolic disease but did not have pulmonary HTN on a November 2021 cath. She is maintained on chronic PO opioids and twice-weekly infusion visits (since 1/24/22) but has been able to be maintained on this regimen and has stayed out of the ED most of the time with even rarer admissions for most of 2023. In 2024, her ED visits have increased somewhat but admissions remain rare.    Interval History:  Jennifer is seen today for routine hematology follow-up.     ***          Sickle Cell Disease Comprehensive Checklist  Bone Health/Avascular Necrosis Screening/Symptoms (each visit): no new concerns today  Leg Ulcer evaluation (every visit): nothing to note  Hypertension (every visit):stable none for several months  Last pulmonary evaluation (asthma, AMAN, pulm HTN): 9/28/22, due for follow-up  Stroke/silent cerebral infarct Hx (Y/N): Yes TIA ~2014, first event ~age 1 with full stroke and R sided weakness  Last PCP Visit: 9/30/24 with Dr. Case  Vaccines:  PCV13: 5/13/19  Pneumovax (PPSV23): 3/04, 10/09, 7/12/19, 10/2024, due in  2029  Menactra: 4/2010, 9/2015 (MCV done 8/16/21)  MenB: 9/16/15, 5/13/19 (due in 2024)  Influenza: 10/11/24  Audiology (chelation): done 2/27/24    Comparison to Previous Audiogram dated 6/6/2022:     Pure Tone Thresholds 250-8000 Hz:    RIGHT: stable    LEFT: stable     High Frequency Audiometry 9,000-16,000Hz:     RIGHT: stable    LEFT: stable     Word Recognition Score:    RIGHT: stable    LEFT: stable    Plan last reviewed with patient: 10/2/23    Patient background: 22 yo F, enjoys movies and kids though there are times where she does not really want to talk to people. Does not have a lot of social support at home.     Sickle Cell Disease History  Primary Hematologist Team: Jose Rafael Duncan  PCP: none  Genotype: SS  Acute Pain Crisis Treatment: (limiting IV)   ER   Dilaudid 1 mg IV q1h up to 3 doses  Toradol 30 mg IV x1   Maintenance IV fluids with LR  Other: Zofran 8 mg IV PRN nausea  Inpatient:  PCA Dilaudid 1 hr q30 minutes, no basal rate  Toradol 30 mg x6h x 4 hr  LR maintenance x 1-2 days  Other Medications: Zofran  ASA  Supportive Care: Docusate Senna  Chronic Pain Medications:    Home regimen: oxycodone 15 mg p.o. q.4-6 hours p.r.n. breakthrough pain.  She also continues on Voltaren gel, and Zoloft among other medications.    -Also benefits from mental health visits, acupuncture  Baseline Hemoglobin: 7 g/dl without chronic transfusions  Hydroxyurea use: Yes  H/O blood transfusions: Yes, several (iron overload) Most recent 11/20/2021  H/O Transfusion Reactions: no  Antibodies:none  H/O Acute Chest Syndrome: Yes  Last episode:9/05/22 (previously 4/26/21, 10/2019)   ICU/intubation: not with 9/2022 admission  H/O Stroke: Yes (managed with chronic transfusions in the past, stopped late Spring 2020)  H/O VTE: Yes (2/2021)  H/O Cholecystectomy or Splenectomy: no  H/O Asthma, Pulm HTN, AVN, Leg Ulcers, Nephropathy, Retinopathy, etc: Iron overload, asthma, chronic lung disease, physical limitations from early  stroke    ---------------------------------------  Jennifer Cervantes's Goals (reviewed today 10/31/24)    1-3 month goal:  Pursue living independently in an apartment    6 month goal:      12 month goal:      Disease-specific goal(s):  Decrease frequency of ED visits. Decrease opioid qty per weekly refill. She'd like to get down to 5 tablets per week       Current Outpatient Medications   Medication Sig Dispense Refill    albuterol (PROVENTIL) (2.5 MG/3ML) 0.083% neb solution Take 2 vials (5 mg) by nebulization every 6 hours as needed for shortness of breath or wheezing. 90 mL 3    aspirin (ASA) 81 MG chewable tablet Take 1 tablet (81 mg) by mouth 2 times daily 60 tablet 11    budesonide-formoterol (SYMBICORT) 160-4.5 MCG/ACT Inhaler Inhale 2 puffs twice daily plus 1-2 puffs as needed. May use up to 12 puffs per day. 20.4 g 11    deferasirox (JADENU) 360 MG tablet Take 4 tablets (1,440 mg) by mouth daily. 120 tablet 11    Deferiprone, Twice Daily, 1000 MG TABS Take 2,000 mg by mouth 2 times daily. 150 tablet 3    diphenhydrAMINE (BENADRYL) 50 MG capsule Administer 12  hours pre - IV contrast injection and 2 hours prior to contrast. Patient must have a . 2 capsule 0    EPINEPHrine (ANY BX GENERIC EQUIV) 0.3 MG/0.3ML injection 2-pack Inject 0.3 mLs (0.3 mg) into the muscle as needed for anaphylaxis. 1 each 1    gabapentin (NEURONTIN) 300 MG capsule Take 1 capsule (300 mg) by mouth 3 times daily. Take PO 1 pill in evening (300 mg) TID to start. If tolerated, increase by 300 mg in morning or lunch every few days, updating your doctor's office in time to get refills. The maximum dose is 900 mg TID. (Patient taking differently: Take 600 mg by mouth at bedtime. Take PO 1 pill in evening (300 mg) TID to start. If tolerated, increase by 300 mg in morning or lunch every few days, updating your doctor's office in time to get refills. The maximum dose is 900 mg TID.) 90 capsule 1    hydroxyurea (HYDREA) 500 MG capsule Take 6  capsules (3,000 mg) by mouth daily 180 capsule 11    ibuprofen (ADVIL/MOTRIN) 800 MG tablet Take 800 mg by mouth every 8 hours as needed for moderate pain      melatonin 5 MG tablet Take 1 tablet (5 mg) by mouth nightly as needed for sleep 30 tablet 1    methocarbamol (ROBAXIN) 750 MG tablet Take 1 tablet (750 mg) by mouth 4 times daily as needed for muscle spasms (during sickle pain crises. Okay to take scheduled while in pain). 60 tablet 1    methylPREDNISolone (MEDROL) 32 MG tablet Take 1 tab 12 hours before appointment and 1 tab 2 hours prior to the procedure with IV contrast. 2 tablet 0    naloxone (NARCAN) 4 MG/0.1ML nasal spray Spray 4 mg into one nostril alternating nostrils as needed for opioid reversal. every 2-3 minutes until assistance arrives 0.2 mL 0    ondansetron (ZOFRAN ODT) 8 MG ODT tab Take 1 tablet (8 mg) by mouth every 8 hours as needed for nausea 40 tablet 4    oxyCODONE IR (ROXICODONE) 10 MG tablet Take 1 tablet (10 mg) by mouth every 6 hours as needed for severe pain or breakthrough pain (must last 2 weeks). Goal 4 per day. Max 6 per day. 20 tablet 0    pantoprazole (PROTONIX) 40 MG EC tablet Take 1 tablet (40 mg) by mouth daily. 30 tablet 0     Past Medical History  Past Medical History:   Diagnosis Date    Anxiety     Bleeding disorder (H)     Blood clotting disorder (H)     Cerebral infarction (H) 2015    Cognitive developmental delay     low IQ. 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 side (H)     right hand contractures    Iron overload due to repeated red blood cell transfusions     Migraines     Multiple subsegmental pulmonary emboli without acute cor pulmonale (H) 02/01/2021    Oppositional defiant behavior     Presence of intrauterine contraceptive device 2/18/2020    Superficial venous thrombosis of arm, right 03/25/2021    Uncomplicated asthma      Past  Surgical History:   Procedure Laterality Date    AS INSERT TUNNELED CV 2 CATH W/O PORT/PUMP      CHOLECYSTECTOMY      CV RIGHT HEART CATH MEASUREMENTS RECORDED N/A 11/18/2021    Procedure: Right Heart Cath;  Surgeon: Jackson Stauffer MD;  Location:  HEART CARDI[facility] CATH LAB    INSERT PORT VASCULAR ACCESS Left 4/21/2021    Procedure: INSERTION, VASCULAR ACCESS PORT (NOT SURE ON SIDE UNTIL REMOVAL);  Surgeon: Rajan More MD;  Location: [facility] OR    IR CHEST PORT PLACEMENT > 5 YRS OF AGE  4/21/2021    IR CVC NON TUNNEL LINE REMOVAL  5/6/2021    IR CVC NON TUNNEL PLACEMENT > 5 YRS  04/07/2020    IR CVC NON TUNNEL PLACEMENT > 5 YRS  4/30/2021    IR CVC NON TUNNEL PLACEMENT > 5 YRS  9/7/2022    IR PORT REMOVAL LEFT  4/21/2021    REMOVE PORT VASCULAR ACCESS Left 4/21/2021    Procedure: REMOVAL, VASCULAR ACCESS PORT LEFT;  Surgeon: Rajan More MD;  Location: [facility] OR    REPAIR TENDON ELBOW Right 10/02/2019    Procedure: Right Elbow Flexor Lengthening, Flexor Pronator Slide Of Wrist and Finger, Thumb Adductor Lengthening;  Surgeon: Anai Franco MD;  Location: [facility] OR    TONSILLECTOMY Bilateral 10/02/2019    Procedure: Bilateral Tonsillectomy;  Surgeon: Farhana Guy MD;  Location: [facility] OR    ZZC BREAST REDUCTION (INCLUDES LIPO) TIER 3 Bilateral 04/23/2019     Allergies   Allergen Reactions    Contrast Dye Angioedema     Hives and breathing issues    Fish-Derived Products Hives    Seafood Hives    Adhesive Tape Hives     Primipore dressing causes hives    Gadolinium     Iodinated Contrast Media      Social History   Social History     Tobacco Use    Smoking status: Never     Passive exposure: Never    Smokeless tobacco: Never   Substance Use Topics    Alcohol use: Not Currently     Alcohol/week: 0.0 standard drinks of alcohol    Drug use: Never   Past medical history and social history were reviewed.    Physical Examination:  LMP  (LMP Unknown)      Wt Readings from Last 10 Encounters:   11/20/24 75.2  kg (165 lb 11.2 oz)   11/15/24 74.8 kg (165 lb)   11/15/24 75.2 kg (165 lb 11.2 oz)   11/14/24 72.6 kg (160 lb)   11/13/24 72.6 kg (160 lb)   11/09/24 72.1 kg (159 lb)   11/08/24 72.5 kg (159 lb 14.4 oz)   11/03/24 70.3 kg (155 lb)   11/01/24 70.3 kg (155 lb)   10/31/24 71.7 kg (158 lb)     General: Pleasant female, NAD  Eyes: EOMI, PERRL  Respiratory: Normal effort, no adventitious breath sounds  Ext: no peripheral edema  Neurologic: chronic contracture of right arm and wrist. Steady gait. A/O x 4.  Skin: No rashes, petechiae, or bruising noted on exposed skin.   Laboratory Data:  Recent Results (from the past 240 hours)   Basic metabolic panel    Collection Time: 11/12/24  2:33 PM   Result Value Ref Range    Sodium 141 135 - 145 mmol/L    Potassium 3.5 3.4 - 5.3 mmol/L    Chloride 112 (H) 98 - 107 mmol/L    Carbon Dioxide (CO2) 21 (L) 22 - 29 mmol/L    Anion Gap 8 7 - 15 mmol/L    Urea Nitrogen 8.4 6.0 - 20.0 mg/dL    Creatinine 0.51 0.51 - 0.95 mg/dL    GFR Estimate >90 >60 mL/min/1.73m2    Calcium 7.5 (L) 8.8 - 10.4 mg/dL    Glucose 81 70 - 99 mg/dL   Reticulocyte count    Collection Time: 11/12/24  2:33 PM   Result Value Ref Range    % Reticulocyte 17.3 (H) 0.5 - 2.0 %    Absolute Reticulocyte 0.432 (H) 0.025 - 0.095 10e6/uL   CBC with platelets and differential    Collection Time: 11/12/24  2:33 PM   Result Value Ref Range    WBC Count 11.7 (H) 4.0 - 11.0 10e3/uL    RBC Count 2.50 (L) 3.80 - 5.20 10e6/uL    Hemoglobin 7.3 (L) 11.7 - 15.7 g/dL    Hematocrit 21.3 (L) 35.0 - 47.0 %    MCV 85 78 - 100 fL    MCH 29.2 26.5 - 33.0 pg    MCHC 34.3 31.5 - 36.5 g/dL    RDW 22.5 (H) 10.0 - 15.0 %    Platelet Count 417 150 - 450 10e3/uL    % Neutrophils 71 %    % Lymphocytes 14 %    % Monocytes 7 %    % Eosinophils 5 %    % Basophils 2 %    % Immature Granulocytes 0 %    NRBCs per 100 WBC 2 (H) <1 /100    Absolute Neutrophils 8.4 (H) 1.6 - 8.3 10e3/uL    Absolute Lymphocytes 1.7 0.8 - 5.3 10e3/uL    Absolute Monocytes 0.9  0.0 - 1.3 10e3/uL    Absolute Eosinophils 0.6 0.0 - 0.7 10e3/uL    Absolute Basophils 0.2 0.0 - 0.2 10e3/uL    Absolute Immature Granulocytes 0.1 <=0.4 10e3/uL    Absolute NRBCs 0.2 10e3/uL   Hepatic panel    Collection Time: 11/12/24  2:33 PM   Result Value Ref Range    Protein Total 6.4 6.4 - 8.3 g/dL    Albumin 3.7 3.5 - 5.2 g/dL    Bilirubin Total 1.6 (H) <=1.2 mg/dL    Alkaline Phosphatase 55 40 - 150 U/L    AST 37 0 - 45 U/L    ALT 29 0 - 50 U/L    Bilirubin Direct 0.27 0.00 - 0.30 mg/dL   UA with Microscopic reflex to Culture    Collection Time: 11/12/24  4:57 PM    Specimen: Urine, Midstream   Result Value Ref Range    Color Urine Yellow Colorless, Straw, Light Yellow, Yellow    Appearance Urine Clear Clear    Glucose Urine Negative Negative mg/dL    Bilirubin Urine Negative Negative    Ketones Urine Negative Negative mg/dL    Specific Gravity Urine 1.011 1.003 - 1.035    Blood Urine Negative Negative    pH Urine 7.5 (H) 5.0 - 7.0    Protein Albumin Urine Negative Negative mg/dL    Urobilinogen Urine 3.0 (A) Normal, 2.0 mg/dL    Nitrite Urine Negative Negative    Leukocyte Esterase Urine Negative Negative    RBC Urine <1 <=2 /HPF    WBC Urine 2 <=5 /HPF    Squamous Epithelials Urine <1 <=1 /HPF   Urine Culture    Collection Time: 11/12/24  4:57 PM    Specimen: Urine, Midstream   Result Value Ref Range    Culture <10,000 CFU/mL Mixture of Urogenital Heena    HCG qualitative urine    Collection Time: 11/12/24  4:57 PM   Result Value Ref Range    hCG Urine Qualitative Negative Negative   EKG 12-lead, tracing only    Collection Time: 11/13/24  2:53 PM   Result Value Ref Range    Systolic Blood Pressure  mmHg    Diastolic Blood Pressure  mmHg    Ventricular Rate 98 BPM    Atrial Rate 98 BPM    MA Interval 148 ms    QRS Duration 76 ms     ms    QTc 474 ms    P Axis 75 degrees    R AXIS 30 degrees    T Axis 22 degrees    Interpretation ECG       Sinus rhythm  Normal ECG  Unconfirmed report - interpretation of  this ECG is computer generated - see medical record for final interpretation  Confirmed by - EMERGENCY ROOM, PHYSICIAN ([id number]),  ELVA ACRVER ([id number]) on 11/14/2024 9:45:10 AM     Comprehensive metabolic panel    Collection Time: 11/13/24  3:31 PM   Result Value Ref Range    Sodium 140 135 - 145 mmol/L    Potassium 4.1 3.4 - 5.3 mmol/L    Carbon Dioxide (CO2) 21 (L) 22 - 29 mmol/L    Anion Gap 13 7 - 15 mmol/L    Urea Nitrogen 11.0 6.0 - 20.0 mg/dL    Creatinine 0.53 0.51 - 0.95 mg/dL    GFR Estimate >90 >60 mL/min/1.73m2    Calcium 9.2 8.8 - 10.4 mg/dL    Chloride 106 98 - 107 mmol/L    Glucose 92 70 - 99 mg/dL    Alkaline Phosphatase 65 40 - 150 U/L    AST 38 0 - 45 U/L    ALT 31 0 - 50 U/L    Protein Total 7.8 6.4 - 8.3 g/dL    Albumin 4.5 3.5 - 5.2 g/dL    Bilirubin Total 2.1 (H) <=1.2 mg/dL   Troponin T, High Sensitivity    Collection Time: 11/13/24  3:31 PM   Result Value Ref Range    Troponin T, High Sensitivity <6 <=14 ng/L   Reticulocyte count    Collection Time: 11/13/24  3:31 PM   Result Value Ref Range    % Reticulocyte 19.7 (H) 0.5 - 2.0 %    Absolute Reticulocyte 0.484 (H) 0.025 - 0.095 10e6/uL   CBC with platelets and differential    Collection Time: 11/13/24  3:31 PM   Result Value Ref Range    WBC Count 14.3 (H) 4.0 - 11.0 10e3/uL    RBC Count 2.62 (L) 3.80 - 5.20 10e6/uL    Hemoglobin 7.8 (L) 11.7 - 15.7 g/dL    Hematocrit 22.6 (L) 35.0 - 47.0 %    MCV 86 78 - 100 fL    MCH 29.8 26.5 - 33.0 pg    MCHC 34.5 31.5 - 36.5 g/dL    RDW 23.8 (H) 10.0 - 15.0 %    Platelet Count 452 (H) 150 - 450 10e3/uL    % Neutrophils 73 %    % Lymphocytes 15 %    % Monocytes 7 %    % Eosinophils 4 %    % Basophils 2 %    % Immature Granulocytes 1 %    NRBCs per 100 WBC 2 (H) <1 /100    Absolute Neutrophils 10.5 (H) 1.6 - 8.3 10e3/uL    Absolute Lymphocytes 2.1 0.8 - 5.3 10e3/uL    Absolute Monocytes 0.9 0.0 - 1.3 10e3/uL    Absolute Eosinophils 0.5 0.0 - 0.7 10e3/uL    Absolute Basophils 0.3 (H) 0.0 - 0.2  10e3/uL    Absolute Immature Granulocytes 0.1 <=0.4 10e3/uL    Absolute NRBCs 0.3 10e3/uL   Influenza A/B, RSV, & SARS-CoV2 PCR (COVID-19) Nasopharyngeal    Collection Time: 11/13/24  3:52 PM    Specimen: Nasopharyngeal; Swab   Result Value Ref Range    Influenza A PCR Negative Negative    Influenza B PCR Negative Negative    RSV PCR Negative Negative    SARS CoV2 PCR Negative Negative   Comprehensive metabolic panel    Collection Time: 11/14/24  9:42 PM   Result Value Ref Range    Sodium 142 135 - 145 mmol/L    Potassium 3.7 3.4 - 5.3 mmol/L    Carbon Dioxide (CO2) 25 22 - 29 mmol/L    Anion Gap 10 7 - 15 mmol/L    Urea Nitrogen 9.2 6.0 - 20.0 mg/dL    Creatinine 0.64 0.51 - 0.95 mg/dL    GFR Estimate >90 >60 mL/min/1.73m2    Calcium 8.7 (L) 8.8 - 10.4 mg/dL    Chloride 107 98 - 107 mmol/L    Glucose 96 70 - 99 mg/dL    Alkaline Phosphatase 59 40 - 150 U/L    AST 37 0 - 45 U/L    ALT 28 0 - 50 U/L    Protein Total 7.0 6.4 - 8.3 g/dL    Albumin 4.2 3.5 - 5.2 g/dL    Bilirubin Total 2.0 (H) <=1.2 mg/dL   CBC with platelets and differential    Collection Time: 11/14/24  9:42 PM   Result Value Ref Range    WBC Count 13.3 (H) 4.0 - 11.0 10e3/uL    RBC Count 2.35 (L) 3.80 - 5.20 10e6/uL    Hemoglobin 7.2 (L) 11.7 - 15.7 g/dL    Hematocrit 20.2 (L) 35.0 - 47.0 %    MCV 86 78 - 100 fL    MCH 30.6 26.5 - 33.0 pg    MCHC 35.6 31.5 - 36.5 g/dL    RDW 23.9 (H) 10.0 - 15.0 %    Platelet Count 430 150 - 450 10e3/uL    % Neutrophils 69 %    % Lymphocytes 19 %    % Monocytes 7 %    % Eosinophils 4 %    % Basophils 2 %    % Immature Granulocytes 0 %    NRBCs per 100 WBC 2 (H) <1 /100    Absolute Neutrophils 9.2 (H) 1.6 - 8.3 10e3/uL    Absolute Lymphocytes 2.5 0.8 - 5.3 10e3/uL    Absolute Monocytes 0.9 0.0 - 1.3 10e3/uL    Absolute Eosinophils 0.5 0.0 - 0.7 10e3/uL    Absolute Basophils 0.2 0.0 - 0.2 10e3/uL    Absolute Immature Granulocytes 0.0 <=0.4 10e3/uL    Absolute NRBCs 0.2 10e3/uL   Comprehensive metabolic panel     Collection Time: 11/15/24  8:02 PM   Result Value Ref Range    Sodium 140 135 - 145 mmol/L    Potassium 4.1 3.4 - 5.3 mmol/L    Carbon Dioxide (CO2) 23 22 - 29 mmol/L    Anion Gap 11 7 - 15 mmol/L    Urea Nitrogen 7.2 6.0 - 20.0 mg/dL    Creatinine 0.54 0.51 - 0.95 mg/dL    GFR Estimate >90 >60 mL/min/1.73m2    Calcium 8.7 (L) 8.8 - 10.4 mg/dL    Chloride 106 98 - 107 mmol/L    Glucose 95 70 - 99 mg/dL    Alkaline Phosphatase 61 40 - 150 U/L    AST 40 0 - 45 U/L    ALT 33 0 - 50 U/L    Protein Total 7.1 6.4 - 8.3 g/dL    Albumin 4.3 3.5 - 5.2 g/dL    Bilirubin Total 2.1 (H) <=1.2 mg/dL   Lipase    Collection Time: 11/15/24  8:02 PM   Result Value Ref Range    Lipase 31 13 - 60 U/L   Reticulocyte count    Collection Time: 11/15/24  8:02 PM   Result Value Ref Range    % Reticulocyte      Absolute Reticulocyte     CBC with platelets and differential    Collection Time: 11/15/24  8:02 PM   Result Value Ref Range    WBC Count 11.6 (H) 4.0 - 11.0 10e3/uL    RBC Count 2.31 (L) 3.80 - 5.20 10e6/uL    Hemoglobin 7.0 (L) 11.7 - 15.7 g/dL    Hematocrit 19.4 (L) 35.0 - 47.0 %    MCV 84 78 - 100 fL    MCH 30.3 26.5 - 33.0 pg    MCHC 36.1 31.5 - 36.5 g/dL    RDW 23.9 (H) 10.0 - 15.0 %    Platelet Count 408 150 - 450 10e3/uL    % Neutrophils 68 %    % Lymphocytes 18 %    % Monocytes 8 %    % Eosinophils 4 %    % Basophils 1 %    % Immature Granulocytes 0 %    NRBCs per 100 WBC 1 (H) <1 /100    Absolute Neutrophils 7.9 1.6 - 8.3 10e3/uL    Absolute Lymphocytes 2.1 0.8 - 5.3 10e3/uL    Absolute Monocytes 0.9 0.0 - 1.3 10e3/uL    Absolute Eosinophils 0.5 0.0 - 0.7 10e3/uL    Absolute Basophils 0.2 0.0 - 0.2 10e3/uL    Absolute Immature Granulocytes 0.0 <=0.4 10e3/uL    Absolute NRBCs 0.2 10e3/uL   UA with Microscopic reflex to Culture    Collection Time: 11/15/24 10:31 PM    Specimen: Urine, Midstream   Result Value Ref Range    Color Urine Light Yellow Colorless, Straw, Light Yellow, Yellow    Appearance Urine Clear Clear    Glucose  Urine Negative Negative mg/dL    Bilirubin Urine Negative Negative    Ketones Urine Negative Negative mg/dL    Specific Gravity Urine 1.010 1.003 - 1.035    Blood Urine Negative Negative    pH Urine 6.5 5.0 - 7.0    Protein Albumin Urine Negative Negative mg/dL    Urobilinogen Urine 2.0 Normal, 2.0 mg/dL    Nitrite Urine Negative Negative    Leukocyte Esterase Urine Negative Negative    Mucus Urine Present (A) None Seen /LPF    RBC Urine 0 <=2 /HPF    WBC Urine 1 <=5 /HPF    Squamous Epithelials Urine 1 <=1 /HPF   Comprehensive metabolic panel    Collection Time: 11/21/24 12:48 AM   Result Value Ref Range    Sodium 137 135 - 145 mmol/L    Potassium 4.0 3.4 - 5.3 mmol/L    Carbon Dioxide (CO2) 23 22 - 29 mmol/L    Anion Gap 10 7 - 15 mmol/L    Urea Nitrogen 10.3 6.0 - 20.0 mg/dL    Creatinine 0.74 0.51 - 0.95 mg/dL    GFR Estimate >90 >60 mL/min/1.73m2    Calcium 8.6 (L) 8.8 - 10.4 mg/dL    Chloride 104 98 - 107 mmol/L    Glucose 91 70 - 99 mg/dL    Alkaline Phosphatase 57 40 - 150 U/L    AST 45 0 - 45 U/L    ALT 33 0 - 50 U/L    Protein Total 6.9 6.4 - 8.3 g/dL    Albumin 4.2 3.5 - 5.2 g/dL    Bilirubin Total 2.1 (H) <=1.2 mg/dL   CBC with platelets and differential    Collection Time: 11/21/24 12:48 AM   Result Value Ref Range    WBC Count 10.6 4.0 - 11.0 10e3/uL    RBC Count 2.51 (L) 3.80 - 5.20 10e6/uL    Hemoglobin 7.7 (L) 11.7 - 15.7 g/dL    Hematocrit 21.5 (L) 35.0 - 47.0 %    MCV 86 78 - 100 fL    MCH 30.7 26.5 - 33.0 pg    MCHC 35.8 31.5 - 36.5 g/dL    RDW 22.2 (H) 10.0 - 15.0 %    Platelet Count 419 150 - 450 10e3/uL    % Neutrophils 61 %    % Lymphocytes 22 %    % Monocytes 9 %    % Eosinophils 6 %    % Basophils 2 %    % Immature Granulocytes 1 %    NRBCs per 100 WBC 1 (H) <1 /100    Absolute Neutrophils 6.5 1.6 - 8.3 10e3/uL    Absolute Lymphocytes 2.3 0.8 - 5.3 10e3/uL    Absolute Monocytes 1.0 0.0 - 1.3 10e3/uL    Absolute Eosinophils 0.6 0.0 - 0.7 10e3/uL    Absolute Basophils 0.2 0.0 - 0.2 10e3/uL     Absolute Immature Granulocytes 0.1 <=0.4 10e3/uL    Absolute NRBCs 0.1 10e3/uL     I reviewed the above labs today.    Assessment and Plan:  1. Sickle Cell HgbSS Disease  2. Acute pain crises  3. Chronic Pain  4. 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 History of CVA  7. Hearing loss  8. Nausea/vomiting       Jennifer is seen today for routine follow-up. She reports increased pain today and may pursue care in the ED following our visit. Her use of the ED has increased over the past few weeks although she is very aware of this and continues to make efforts to reduce ED visits. Her oxycodone prescription was recently decreased from 15 to 10 tablets/week. It is very apparent she is making an effort to meet her goals. She verbalizes clear understanding that if she increases use of IV opioids while decreasing oral opioid use, this negates efforts to reduce overall opioids.     She is due for CT Abdomen/Pelvis tomorrow as ordered by Dr. Case. and EGD for some intermittent abdominal pain, nausea and vomiting. I do agree we should proceed with both procedures as intermittent nausea and vomiting has been a precursor to pain flares and ED visits at times.    She has been encouraged her to try to extend the length of her prescriptions for greater than 1 week to extend the time windows although this wasn't discussed in detail today.     Deferiprone was added back to her iron chelation regimen. She had been off of this for a few months due to apparent confusion with the pharmacy over taking dual treatment with Jadenu. I ensured Jennifer we would keep track of this more closely in the future to ensure compliance. Her next ferriscan is due next month.       -------------------------------------  Plan:  -Continue Hydrea to 3000mg daily to help lessen frequency of sickle cell pain.   -Continue monthly crizanlizumab infusions  -Continue slow taper of oxycodone. Currently receiving  oxycodone 10 mg every 4 hours PRN, qty 10.    -She can self-reduce infusion days/week and we will continue to keep the cap at 2/week for now.   -Continue infusion center visits limited to two times per week (Mondays and Fridays ideally although still needs to call to request). Continue diligent home management with current medications, heat, rest, compression, warm baths.   -If unable to manage at home can go to ED  with continued plan to use IV Dilaudid and take a break from ketamine (10/2/23). No PCA use and goal for any admission would still be to discharge by 5 days or less  -EGD for evaluation of ongoing n/v and abdominal pain, scheduled in November.  CT abdomen/pelvis also due as ordered by Dr. Case.  Complete stool studies for calprotectin and H. pylori.  -Continue metoclopramide 5 mg TID PRN  - Continue Jadenu for iron overload and deferiprone. Due for Ferriscan in November 2024.   -Continue aspirin BID  -Due at some point for Meningitis B booster.  -RTC with me in 1 month    Patricia Mantilla CNP    ---  *** minutes spent on the date of the encounter doing {2021 E&M time in:[id number]}.      The longitudinal plan of care for the diagnosis(es)/condition(s) as documented were addressed during this visit. Due to the added complexity in care, I will continue to support Jennifer in the subsequent management and with ongoing continuity of care.                  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 She had multiple thromboembolic events in 2021 despite adherent  "anticoagulation use (though warfarin was perpetually low) and there are concerns for chronic thromboembolic disease but did not have pulmonary HTN on a November 2021 cath. She is maintained on chronic PO opioids and twice-weekly infusion visits (since 1/24/22) but has been able to be maintained on this regimen and has stayed out of the ED most of the time with even rarer admissions for most of 2023. In 2024, her ED visits have increased somewhat but admissions remain rare.    Interval History:  Jennifer is seen today for routine hematology follow-up.     Today she is in the infusion center receiving IV fluids and pain medication for sickle cell pain.  She has had an increase in ED utilization over the past few weeks although during her last visit on 11/22 she left the ED before being treated with pain medication as she wanted to try to manage things at home.  Last week she informed me and Dr. Duncan that she would like to receive a 2-week supply of oxycodone and then stop oral oxycodone altogether.  When I inquired about her rationale for this she stated this was due to a few reasons.  She states \"I just want to be done with this\".  She recalls an encounter recently in the ED with one of her providers where she felt she was being singled out as an addict and feels this is one way to prove that this is not the case.  She does not have a specific plan on how to best manage pain at home although does clearly state that her overall goal is to only utilize the infusion center when needed for pain flares and avoid the ED is much as possible.  She has been trying alternative techniques and measures to treat pain at home and recently bought essential oils which she has used in the bath and shower to help calm her.    Over the past few days she has developed voice hoarseness and mild fatigue.  She feels she may be getting a cold.  She denies any cough or shortness of breath.  Denies any fever/chills.  Denies any significant " pharyngitis.    Today she inquires about alternative birth control options.  She currently has a Nexplanon but continues to get her period and feels she is bleeding more heavily than usual.  She is interested in an option to completely stop her periods.  She is planning on scheduling appointment in the PCP clinic to have the Nexplanon removed in the near future.          Sickle Cell Disease Comprehensive Checklist  Bone Health/Avascular Necrosis Screening/Symptoms (each visit): no new concerns today  Leg Ulcer evaluation (every visit): nothing to note  Hypertension (every visit):stable none for several months  Last pulmonary evaluation (asthma, AMAN, pulm HTN): 9/28/22, due for follow-up  Stroke/silent cerebral infarct Hx (Y/N): Yes TIA ~2014, first event ~age 1 with full stroke and R sided weakness  Last PCP Visit: 9/30/24 with Dr. Case  Vaccines:  PCV13: 5/13/19  Pneumovax (PPSV23): 3/04, 10/09, 7/12/19, 10/2024, due in 2029  Menactra: 4/2010, 9/2015 (MCV done 8/16/21)  MenB: 9/16/15, 5/13/19 (due in 2024)  Influenza: 10/11/24  Audiology (chelation): done 2/27/24    Comparison to Previous Audiogram dated 6/6/2022:     Pure Tone Thresholds 250-8000 Hz:    RIGHT: stable    LEFT: stable     High Frequency Audiometry 9,000-16,000Hz:     RIGHT: stable    LEFT: stable     Word Recognition Score:    RIGHT: stable    LEFT: stable    Plan last reviewed with patient: 10/2/23    Patient background: 24 yo F, enjoys movies and kids though there are times where she does not really want to talk to people. Does not have a lot of social support at home.     Sickle Cell Disease History  Primary Hematologist Team: Jose Rafael Duncan  PCP: none  Genotype: SS  Acute Pain Crisis Treatment: (limiting IV)   ER   Dilaudid 1 mg IV q1h up to 3 doses  Toradol 30 mg IV x1   Maintenance IV fluids with LR  Other: Zofran 8 mg IV PRN nausea  Inpatient:  PCA Dilaudid 1 hr q30 minutes, no basal rate  Toradol 30 mg x6h x 4 hr  LR maintenance x 1-2  days  Other Medications: Zofran  ASA  Supportive Care: Docusate, Senna  Chronic Pain Medications:    Home regimen: oxycodone 15 mg p.o. q.4-6 hours p.r.n. breakthrough pain.  She also continues on Voltaren gel, and Zoloft among other medications.    -Also benefits from mental health visits, acupuncture  Baseline Hemoglobin: 7 g/dl without chronic transfusions  Hydroxyurea use: Yes  H/O blood transfusions: Yes, several (iron overload) Most recent 11/20/2021  H/O Transfusion Reactions: no  Antibodies:none  H/O Acute Chest Syndrome: Yes  Last episode:9/05/22 (previously 4/26/21, 10/2019)   ICU/intubation: not with 9/2022 admission  H/O Stroke: Yes (managed with chronic transfusions in the past, stopped late Spring 2020)  H/O VTE: Yes (2/2021)  H/O Cholecystectomy or Splenectomy: no  H/O Asthma, Pulm HTN, AVN, Leg Ulcers, Nephropathy, Retinopathy, etc: Iron overload, asthma, chronic lung disease, physical limitations from early stroke    ---------------------------------------  Jennifer Cervantes's Goals (did not discuss today)    1-3 month goal:  Pursue living independently in an apartment    6 month goal:      12 month goal:      Disease-specific goal(s):  Decrease frequency of ED visits. Decrease opioid qty per weekly refill. She'd like to get down to 5 tablets per week       Current Outpatient Medications   Medication Sig Dispense Refill     albuterol (PROVENTIL) (2.5 MG/3ML) 0.083% neb solution Take 2 vials (5 mg) by nebulization every 6 hours as needed for shortness of breath or wheezing. 90 mL 3     aspirin (ASA) 81 MG chewable tablet Take 1 tablet (81 mg) by mouth 2 times daily 60 tablet 11     budesonide-formoterol (SYMBICORT) 160-4.5 MCG/ACT Inhaler Inhale 2 puffs twice daily plus 1-2 puffs as needed. May use up to 12 puffs per day. 20.4 g 11     deferasirox (JADENU) 360 MG tablet Take 4 tablets (1,440 mg) by mouth daily. 120 tablet 11     Deferiprone, Twice Daily, 1000 MG TABS Take 2,000 mg by mouth 2 times daily.  150 tablet 3     diphenhydrAMINE (BENADRYL) 50 MG capsule Administer 12  hours pre - IV contrast injection and 2 hours prior to contrast. Patient must have a . 2 capsule 0     EPINEPHrine (ANY BX GENERIC EQUIV) 0.3 MG/0.3ML injection 2-pack Inject 0.3 mLs (0.3 mg) into the muscle as needed for anaphylaxis. 1 each 1     gabapentin (NEURONTIN) 300 MG capsule Take 1 capsule (300 mg) by mouth 3 times daily. Take PO 1 pill in evening (300 mg) TID to start. If tolerated, increase by 300 mg in morning or lunch every few days, updating your doctor's office in time to get refills. The maximum dose is 900 mg TID. (Patient taking differently: Take 600 mg by mouth at bedtime. Take PO 1 pill in evening (300 mg) TID to start. If tolerated, increase by 300 mg in morning or lunch every few days, updating your doctor's office in time to get refills. The maximum dose is 900 mg TID.) 90 capsule 1     hydroxyurea (HYDREA) 500 MG capsule Take 6 capsules (3,000 mg) by mouth daily 180 capsule 11     ibuprofen (ADVIL/MOTRIN) 800 MG tablet Take 800 mg by mouth every 8 hours as needed for moderate pain       melatonin 5 MG tablet Take 1 tablet (5 mg) by mouth nightly as needed for sleep 30 tablet 1     methocarbamol (ROBAXIN) 750 MG tablet Take 1 tablet (750 mg) by mouth 4 times daily as needed for muscle spasms (during sickle pain crises. Okay to take scheduled while in pain). 60 tablet 1     methylPREDNISolone (MEDROL) 32 MG tablet Take 1 tab 12 hours before appointment and 1 tab 2 hours prior to the procedure with IV contrast. 2 tablet 0     naloxone (NARCAN) 4 MG/0.1ML nasal spray Spray 4 mg into one nostril alternating nostrils as needed for opioid reversal. every 2-3 minutes until assistance arrives 0.2 mL 0     ondansetron (ZOFRAN ODT) 8 MG ODT tab Take 1 tablet (8 mg) by mouth every 8 hours as needed for nausea 40 tablet 4     oxyCODONE IR (ROXICODONE) 10 MG tablet Take 1 tablet (10 mg) by mouth every 6 hours as needed for  severe pain or breakthrough pain (must last 2 weeks). Goal 4 per day. Max 6 per day. 20 tablet 0     pantoprazole (PROTONIX) 40 MG EC tablet Take 1 tablet (40 mg) by mouth daily. 30 tablet 0     Past Medical History  Past Medical History:   Diagnosis Date     Anxiety      Bleeding disorder (H)      Blood clotting disorder (H)      Cerebral infarction (H) 2015     Cognitive developmental delay     low IQ. Please recognize when managing pain and planning with her     Depressive disorder      Hemiplegia and hemiparesis following cerebral infarction affecting right dominant side (H)     right hand contractures     Iron overload due to repeated red blood cell transfusions      Migraines      Multiple subsegmental pulmonary emboli without acute cor pulmonale (H) 02/01/2021     Oppositional defiant behavior      Presence of intrauterine contraceptive device 2/18/2020     Superficial venous thrombosis of arm, right 03/25/2021     Uncomplicated asthma      Past Surgical History:   Procedure Laterality Date     AS INSERT TUNNELED CV 2 CATH W/O PORT/PUMP       CHOLECYSTECTOMY       CV RIGHT HEART CATH MEASUREMENTS RECORDED N/A 11/18/2021    Procedure: Right Heart Cath;  Surgeon: Jackson Stauffer MD;  Location:  [facility]ATH [facility]     INSERT PORT VASCULAR ACCESS Left 4/21/2021    Procedure: INSERTION, VASCULAR ACCESS PORT (NOT SURE ON SIDE UNTIL REMOVAL);  Surgeon: Rajan More MD;  Location: [facility] OR     IR CHEST PORT PLACEMENT > 5 YRS OF AGE  4/21/2021     IR CVC NON TUNNEL LINE REMOVAL  5/6/2021     IR CVC NON TUNNEL PLACEMENT > 5 YRS  04/07/2020     IR CVC NON TUNNEL PLACEMENT > 5 YRS  4/30/2021     IR CVC NON TUNNEL PLACEMENT > 5 YRS  9/7/2022     IR PORT REMOVAL LEFT  4/21/2021     REMOVE PORT VASCULAR ACCESS Left 4/21/2021    Procedure: REMOVAL, VASCULAR ACCESS PORT LEFT;  Surgeon: Rajan More MD;  Location: [facility] OR     REPAIR TENDON ELBOW Right 10/02/2019    Procedure: Right Elbow Flexor Lengthening, Flexor  Pronator Slide Of Wrist and Finger, Thumb Adductor Lengthening;  Surgeon: Anai Franco MD;  Location: [facility] OR     TONSILLECTOMY Bilateral 10/02/2019    Procedure: Bilateral Tonsillectomy;  Surgeon: Farhana Guy MD;  Location: [facility] OR     ZC BREAST REDUCTION (INCLUDES LIPO) TIER 3 Bilateral 04/23/2019     Allergies   Allergen Reactions     Contrast Dye Angioedema     Hives and breathing issues     Fish-Derived Products Hives     Seafood Hives     Adhesive Tape Hives     Primipore dressing causes hives     Gadolinium      Iodinated Contrast Media      Social History   Social History     Tobacco Use     Smoking status: Never     Passive exposure: Never     Smokeless tobacco: Never   Substance Use Topics     Alcohol use: Not Currently     Alcohol/week: 0.0 standard drinks of alcohol     Drug use: Never   Past medical history and social history were reviewed.    Physical Examination:  LMP  (LMP Unknown)      Wt Readings from Last 10 Encounters:   11/20/24 75.2 kg (165 lb 11.2 oz)   11/15/24 74.8 kg (165 lb)   11/15/24 75.2 kg (165 lb 11.2 oz)   11/14/24 72.6 kg (160 lb)   11/13/24 72.6 kg (160 lb)   11/09/24 72.1 kg (159 lb)   11/08/24 72.5 kg (159 lb 14.4 oz)   11/03/24 70.3 kg (155 lb)   11/01/24 70.3 kg (155 lb)   10/31/24 71.7 kg (158 lb)     General: Pleasant female, NAD  Eyes: EOMI, PERRL  ENT: oral mucosa moist, pink. No erythema or tonsillar exudate. No cervical lymphadenopathy.  Respiratory: CTA bilaterally, no wheezes or rhonchi  Ext: no peripheral edema  Neurologic: chronic contracture of right arm and wrist. Steady gait. A/O x 4.  Skin: No rashes, petechiae, or bruising noted on exposed skin.   Laboratory Data:  Recent Results (from the past 240 hours)   Basic metabolic panel    Collection Time: 11/12/24  2:33 PM   Result Value Ref Range    Sodium 141 135 - 145 mmol/L    Potassium 3.5 3.4 - 5.3 mmol/L    Chloride 112 (H) 98 - 107 mmol/L    Carbon Dioxide (CO2) 21 (L) 22 - 29 mmol/L     Anion Gap 8 7 - 15 mmol/L    Urea Nitrogen 8.4 6.0 - 20.0 mg/dL    Creatinine 0.51 0.51 - 0.95 mg/dL    GFR Estimate >90 >60 mL/min/1.73m2    Calcium 7.5 (L) 8.8 - 10.4 mg/dL    Glucose 81 70 - 99 mg/dL   Reticulocyte count    Collection Time: 11/12/24  2:33 PM   Result Value Ref Range    % Reticulocyte 17.3 (H) 0.5 - 2.0 %    Absolute Reticulocyte 0.432 (H) 0.025 - 0.095 10e6/uL   CBC with platelets and differential    Collection Time: 11/12/24  2:33 PM   Result Value Ref Range    WBC Count 11.7 (H) 4.0 - 11.0 10e3/uL    RBC Count 2.50 (L) 3.80 - 5.20 10e6/uL    Hemoglobin 7.3 (L) 11.7 - 15.7 g/dL    Hematocrit 21.3 (L) 35.0 - 47.0 %    MCV 85 78 - 100 fL    MCH 29.2 26.5 - 33.0 pg    MCHC 34.3 31.5 - 36.5 g/dL    RDW 22.5 (H) 10.0 - 15.0 %    Platelet Count 417 150 - 450 10e3/uL    % Neutrophils 71 %    % Lymphocytes 14 %    % Monocytes 7 %    % Eosinophils 5 %    % Basophils 2 %    % Immature Granulocytes 0 %    NRBCs per 100 WBC 2 (H) <1 /100    Absolute Neutrophils 8.4 (H) 1.6 - 8.3 10e3/uL    Absolute Lymphocytes 1.7 0.8 - 5.3 10e3/uL    Absolute Monocytes 0.9 0.0 - 1.3 10e3/uL    Absolute Eosinophils 0.6 0.0 - 0.7 10e3/uL    Absolute Basophils 0.2 0.0 - 0.2 10e3/uL    Absolute Immature Granulocytes 0.1 <=0.4 10e3/uL    Absolute NRBCs 0.2 10e3/uL   Hepatic panel    Collection Time: 11/12/24  2:33 PM   Result Value Ref Range    Protein Total 6.4 6.4 - 8.3 g/dL    Albumin 3.7 3.5 - 5.2 g/dL    Bilirubin Total 1.6 (H) <=1.2 mg/dL    Alkaline Phosphatase 55 40 - 150 U/L    AST 37 0 - 45 U/L    ALT 29 0 - 50 U/L    Bilirubin Direct 0.27 0.00 - 0.30 mg/dL   UA with Microscopic reflex to Culture    Collection Time: 11/12/24  4:57 PM    Specimen: Urine, Midstream   Result Value Ref Range    Color Urine Yellow Colorless, Straw, Light Yellow, Yellow    Appearance Urine Clear Clear    Glucose Urine Negative Negative mg/dL    Bilirubin Urine Negative Negative    Ketones Urine Negative Negative mg/dL    Specific Gravity  Urine 1.011 1.003 - 1.035    Blood Urine Negative Negative    pH Urine 7.5 (H) 5.0 - 7.0    Protein Albumin Urine Negative Negative mg/dL    Urobilinogen Urine 3.0 (A) Normal, 2.0 mg/dL    Nitrite Urine Negative Negative    Leukocyte Esterase Urine Negative Negative    RBC Urine <1 <=2 /HPF    WBC Urine 2 <=5 /HPF    Squamous Epithelials Urine <1 <=1 /HPF   Urine Culture    Collection Time: 11/12/24  4:57 PM    Specimen: Urine, Midstream   Result Value Ref Range    Culture <10,000 CFU/mL Mixture of Urogenital Heena    HCG qualitative urine    Collection Time: 11/12/24  4:57 PM   Result Value Ref Range    hCG Urine Qualitative Negative Negative   EKG 12-lead, tracing only    Collection Time: 11/13/24  2:53 PM   Result Value Ref Range    Systolic Blood Pressure  mmHg    Diastolic Blood Pressure  mmHg    Ventricular Rate 98 BPM    Atrial Rate 98 BPM    NH Interval 148 ms    QRS Duration 76 ms     ms    QTc 474 ms    P Axis 75 degrees    R AXIS 30 degrees    T Axis 22 degrees    Interpretation ECG       Sinus rhythm  Normal ECG  Unconfirmed report - interpretation of this ECG is computer generated - see medical record for final interpretation  Confirmed by - EMERGENCY ROOM, PHYSICIAN ([id number]),  ELVA CARVER ([id number]) on 11/14/2024 9:45:10 AM     Comprehensive metabolic panel    Collection Time: 11/13/24  3:31 PM   Result Value Ref Range    Sodium 140 135 - 145 mmol/L    Potassium 4.1 3.4 - 5.3 mmol/L    Carbon Dioxide (CO2) 21 (L) 22 - 29 mmol/L    Anion Gap 13 7 - 15 mmol/L    Urea Nitrogen 11.0 6.0 - 20.0 mg/dL    Creatinine 0.53 0.51 - 0.95 mg/dL    GFR Estimate >90 >60 mL/min/1.73m2    Calcium 9.2 8.8 - 10.4 mg/dL    Chloride 106 98 - 107 mmol/L    Glucose 92 70 - 99 mg/dL    Alkaline Phosphatase 65 40 - 150 U/L    AST 38 0 - 45 U/L    ALT 31 0 - 50 U/L    Protein Total 7.8 6.4 - 8.3 g/dL    Albumin 4.5 3.5 - 5.2 g/dL    Bilirubin Total 2.1 (H) <=1.2 mg/dL   Troponin T, High Sensitivity    Collection  Time: 11/13/24  3:31 PM   Result Value Ref Range    Troponin T, High Sensitivity <6 <=14 ng/L   Reticulocyte count    Collection Time: 11/13/24  3:31 PM   Result Value Ref Range    % Reticulocyte 19.7 (H) 0.5 - 2.0 %    Absolute Reticulocyte 0.484 (H) 0.025 - 0.095 10e6/uL   CBC with platelets and differential    Collection Time: 11/13/24  3:31 PM   Result Value Ref Range    WBC Count 14.3 (H) 4.0 - 11.0 10e3/uL    RBC Count 2.62 (L) 3.80 - 5.20 10e6/uL    Hemoglobin 7.8 (L) 11.7 - 15.7 g/dL    Hematocrit 22.6 (L) 35.0 - 47.0 %    MCV 86 78 - 100 fL    MCH 29.8 26.5 - 33.0 pg    MCHC 34.5 31.5 - 36.5 g/dL    RDW 23.8 (H) 10.0 - 15.0 %    Platelet Count 452 (H) 150 - 450 10e3/uL    % Neutrophils 73 %    % Lymphocytes 15 %    % Monocytes 7 %    % Eosinophils 4 %    % Basophils 2 %    % Immature Granulocytes 1 %    NRBCs per 100 WBC 2 (H) <1 /100    Absolute Neutrophils 10.5 (H) 1.6 - 8.3 10e3/uL    Absolute Lymphocytes 2.1 0.8 - 5.3 10e3/uL    Absolute Monocytes 0.9 0.0 - 1.3 10e3/uL    Absolute Eosinophils 0.5 0.0 - 0.7 10e3/uL    Absolute Basophils 0.3 (H) 0.0 - 0.2 10e3/uL    Absolute Immature Granulocytes 0.1 <=0.4 10e3/uL    Absolute NRBCs 0.3 10e3/uL   Influenza A/B, RSV, & SARS-CoV2 PCR (COVID-19) Nasopharyngeal    Collection Time: 11/13/24  3:52 PM    Specimen: Nasopharyngeal; Swab   Result Value Ref Range    Influenza A PCR Negative Negative    Influenza B PCR Negative Negative    RSV PCR Negative Negative    SARS CoV2 PCR Negative Negative   Comprehensive metabolic panel    Collection Time: 11/14/24  9:42 PM   Result Value Ref Range    Sodium 142 135 - 145 mmol/L    Potassium 3.7 3.4 - 5.3 mmol/L    Carbon Dioxide (CO2) 25 22 - 29 mmol/L    Anion Gap 10 7 - 15 mmol/L    Urea Nitrogen 9.2 6.0 - 20.0 mg/dL    Creatinine 0.64 0.51 - 0.95 mg/dL    GFR Estimate >90 >60 mL/min/1.73m2    Calcium 8.7 (L) 8.8 - 10.4 mg/dL    Chloride 107 98 - 107 mmol/L    Glucose 96 70 - 99 mg/dL    Alkaline Phosphatase 59 40 - 150  U/L    AST 37 0 - 45 U/L    ALT 28 0 - 50 U/L    Protein Total 7.0 6.4 - 8.3 g/dL    Albumin 4.2 3.5 - 5.2 g/dL    Bilirubin Total 2.0 (H) <=1.2 mg/dL   CBC with platelets and differential    Collection Time: 11/14/24  9:42 PM   Result Value Ref Range    WBC Count 13.3 (H) 4.0 - 11.0 10e3/uL    RBC Count 2.35 (L) 3.80 - 5.20 10e6/uL    Hemoglobin 7.2 (L) 11.7 - 15.7 g/dL    Hematocrit 20.2 (L) 35.0 - 47.0 %    MCV 86 78 - 100 fL    MCH 30.6 26.5 - 33.0 pg    MCHC 35.6 31.5 - 36.5 g/dL    RDW 23.9 (H) 10.0 - 15.0 %    Platelet Count 430 150 - 450 10e3/uL    % Neutrophils 69 %    % Lymphocytes 19 %    % Monocytes 7 %    % Eosinophils 4 %    % Basophils 2 %    % Immature Granulocytes 0 %    NRBCs per 100 WBC 2 (H) <1 /100    Absolute Neutrophils 9.2 (H) 1.6 - 8.3 10e3/uL    Absolute Lymphocytes 2.5 0.8 - 5.3 10e3/uL    Absolute Monocytes 0.9 0.0 - 1.3 10e3/uL    Absolute Eosinophils 0.5 0.0 - 0.7 10e3/uL    Absolute Basophils 0.2 0.0 - 0.2 10e3/uL    Absolute Immature Granulocytes 0.0 <=0.4 10e3/uL    Absolute NRBCs 0.2 10e3/uL   Comprehensive metabolic panel    Collection Time: 11/15/24  8:02 PM   Result Value Ref Range    Sodium 140 135 - 145 mmol/L    Potassium 4.1 3.4 - 5.3 mmol/L    Carbon Dioxide (CO2) 23 22 - 29 mmol/L    Anion Gap 11 7 - 15 mmol/L    Urea Nitrogen 7.2 6.0 - 20.0 mg/dL    Creatinine 0.54 0.51 - 0.95 mg/dL    GFR Estimate >90 >60 mL/min/1.73m2    Calcium 8.7 (L) 8.8 - 10.4 mg/dL    Chloride 106 98 - 107 mmol/L    Glucose 95 70 - 99 mg/dL    Alkaline Phosphatase 61 40 - 150 U/L    AST 40 0 - 45 U/L    ALT 33 0 - 50 U/L    Protein Total 7.1 6.4 - 8.3 g/dL    Albumin 4.3 3.5 - 5.2 g/dL    Bilirubin Total 2.1 (H) <=1.2 mg/dL   Lipase    Collection Time: 11/15/24  8:02 PM   Result Value Ref Range    Lipase 31 13 - 60 U/L   Reticulocyte count    Collection Time: 11/15/24  8:02 PM   Result Value Ref Range    % Reticulocyte      Absolute Reticulocyte     CBC with platelets and differential    Collection  Time: 11/15/24  8:02 PM   Result Value Ref Range    WBC Count 11.6 (H) 4.0 - 11.0 10e3/uL    RBC Count 2.31 (L) 3.80 - 5.20 10e6/uL    Hemoglobin 7.0 (L) 11.7 - 15.7 g/dL    Hematocrit 19.4 (L) 35.0 - 47.0 %    MCV 84 78 - 100 fL    MCH 30.3 26.5 - 33.0 pg    MCHC 36.1 31.5 - 36.5 g/dL    RDW 23.9 (H) 10.0 - 15.0 %    Platelet Count 408 150 - 450 10e3/uL    % Neutrophils 68 %    % Lymphocytes 18 %    % Monocytes 8 %    % Eosinophils 4 %    % Basophils 1 %    % Immature Granulocytes 0 %    NRBCs per 100 WBC 1 (H) <1 /100    Absolute Neutrophils 7.9 1.6 - 8.3 10e3/uL    Absolute Lymphocytes 2.1 0.8 - 5.3 10e3/uL    Absolute Monocytes 0.9 0.0 - 1.3 10e3/uL    Absolute Eosinophils 0.5 0.0 - 0.7 10e3/uL    Absolute Basophils 0.2 0.0 - 0.2 10e3/uL    Absolute Immature Granulocytes 0.0 <=0.4 10e3/uL    Absolute NRBCs 0.2 10e3/uL   UA with Microscopic reflex to Culture    Collection Time: 11/15/24 10:31 PM    Specimen: Urine, Midstream   Result Value Ref Range    Color Urine Light Yellow Colorless, Straw, Light Yellow, Yellow    Appearance Urine Clear Clear    Glucose Urine Negative Negative mg/dL    Bilirubin Urine Negative Negative    Ketones Urine Negative Negative mg/dL    Specific Gravity Urine 1.010 1.003 - 1.035    Blood Urine Negative Negative    pH Urine 6.5 5.0 - 7.0    Protein Albumin Urine Negative Negative mg/dL    Urobilinogen Urine 2.0 Normal, 2.0 mg/dL    Nitrite Urine Negative Negative    Leukocyte Esterase Urine Negative Negative    Mucus Urine Present (A) None Seen /LPF    RBC Urine 0 <=2 /HPF    WBC Urine 1 <=5 /HPF    Squamous Epithelials Urine 1 <=1 /HPF   Comprehensive metabolic panel    Collection Time: 11/21/24 12:48 AM   Result Value Ref Range    Sodium 137 135 - 145 mmol/L    Potassium 4.0 3.4 - 5.3 mmol/L    Carbon Dioxide (CO2) 23 22 - 29 mmol/L    Anion Gap 10 7 - 15 mmol/L    Urea Nitrogen 10.3 6.0 - 20.0 mg/dL    Creatinine 0.74 0.51 - 0.95 mg/dL    GFR Estimate >90 >60 mL/min/1.73m2     Calcium 8.6 (L) 8.8 - 10.4 mg/dL    Chloride 104 98 - 107 mmol/L    Glucose 91 70 - 99 mg/dL    Alkaline Phosphatase 57 40 - 150 U/L    AST 45 0 - 45 U/L    ALT 33 0 - 50 U/L    Protein Total 6.9 6.4 - 8.3 g/dL    Albumin 4.2 3.5 - 5.2 g/dL    Bilirubin Total 2.1 (H) <=1.2 mg/dL   CBC with platelets and differential    Collection Time: 11/21/24 12:48 AM   Result Value Ref Range    WBC Count 10.6 4.0 - 11.0 10e3/uL    RBC Count 2.51 (L) 3.80 - 5.20 10e6/uL    Hemoglobin 7.7 (L) 11.7 - 15.7 g/dL    Hematocrit 21.5 (L) 35.0 - 47.0 %    MCV 86 78 - 100 fL    MCH 30.7 26.5 - 33.0 pg    MCHC 35.8 31.5 - 36.5 g/dL    RDW 22.2 (H) 10.0 - 15.0 %    Platelet Count 419 150 - 450 10e3/uL    % Neutrophils 61 %    % Lymphocytes 22 %    % Monocytes 9 %    % Eosinophils 6 %    % Basophils 2 %    % Immature Granulocytes 1 %    NRBCs per 100 WBC 1 (H) <1 /100    Absolute Neutrophils 6.5 1.6 - 8.3 10e3/uL    Absolute Lymphocytes 2.3 0.8 - 5.3 10e3/uL    Absolute Monocytes 1.0 0.0 - 1.3 10e3/uL    Absolute Eosinophils 0.6 0.0 - 0.7 10e3/uL    Absolute Basophils 0.2 0.0 - 0.2 10e3/uL    Absolute Immature Granulocytes 0.1 <=0.4 10e3/uL    Absolute NRBCs 0.1 10e3/uL     I reviewed the above labs today.    Assessment and Plan:  1. Sickle Cell HgbSS Disease  2. Acute pain crises  3. Chronic Pain  4. 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 History of CVA  7. Hearing loss  8. Nausea/vomiting       Jennifer is seen today for routine follow-up.  Most recently she has decided to make an effort to completely stop oral oxycodone use.  Rather than receiving a weekly refill we have provided a 2-week supply of oxycodone with a goal of discontinuing the home oxycodone use completely after this point.  We discussed this in additional detail today.  Her next refill would be due 12/2/2024.  If at that time she decides she is not yet ready to stop her oxycodone use it would be reasonable to continue with  her weekly refills of 10 tablets.  Alternatively we can also attempt to decrease her total quantity to 5 tablets/week.  She feels comfortable with this plan and making a game day decision next week depending on how she is feeling.  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  I do not think a chest x-ray or viral swabs are clinically indicated at this point.    Regarding decreasing her oxycodone use, we did discuss that this would only be a reasonable as long as she does not show an uptrend in her ED utilization.  She reiterates that her goal is to stay out of the hospital is much as possible and only utilize the infusion center if needed for pain flares.  We will continue to watch this trend over the next few weeks.    She is planning to either exchange or remove her Nexplanon in the near future.  She inquires about alternative birth control options which would completely eliminate her menstrual period.  I will defer this decision to her PCP.    Deferiprone was added back to her iron chelation regimen. She had been off of this for a few months due to apparent confusion with the pharmacy over taking dual treatment with Jadenu. She is scheduled for a Ferriscan     -------------------------------------  Plan:  -Continue Hydrea to 3000mg daily to help lessen frequency of sickle cell pain.   -Continue monthly crizanlizumab infusions  -Continue slow taper of oxycodone. Currently receiving oxycodone 10 mg every 4 hours PRN, qty 10.    -She can self-reduce infusion days/week and we will continue to keep the cap at 2/week for now.   -Continue infusion center visits limited to two times per week (Mondays and Fridays ideally although still needs to call to request). Continue diligent home management with current medications, heat, rest, compression, warm baths.   -If unable to manage at home  can go to ED  with continued plan to use IV Dilaudid and take a break from ketamine (10/2/23). No PCA use and goal for any admission would still be to discharge by 5 days or less  -EGD for evaluation of ongoing n/v and abdominal pain, scheduled in November.  CT abdomen/pelvis also due as ordered by Dr. Case.  Complete stool studies for calprotectin and H. pylori.  -Continue metoclopramide 5 mg TID PRN  - Continue Jadenu for iron overload and deferiprone. Due for Ferriscan in November 2024.   -Continue aspirin BID  -Due at some point for Meningitis B booster.  -RTC with me in 1 month    Patricia Mantilla CNP    ---  *** minutes spent on the date of the encounter doing {2021 E&M time in:[id number]}.      The longitudinal plan of care for the diagnosis(es)/condition(s) as documented were addressed during this visit. Due to the added complexity in care, I will continue to support Jennifer in the subsequent management and with ongoing continuity of care.                    Again, thank you for allowing me to participate in the care of your patient.        Sincerely,        Patricia Mantilla CNP

## 2024-11-27 ENCOUNTER — NURSE TRIAGE (OUTPATIENT)
Dept: ONCOLOGY | Facility: CLINIC | Age: 24
End: 2024-11-27

## 2024-11-27 ENCOUNTER — APPOINTMENT (OUTPATIENT)
Dept: CT IMAGING | Facility: CLINIC | Age: 24
End: 2024-11-27
Attending: PHYSICIAN ASSISTANT
Payer: COMMERCIAL

## 2024-11-27 ENCOUNTER — HOSPITAL ENCOUNTER (INPATIENT)
Facility: CLINIC | Age: 24
End: 2024-11-27
Attending: EMERGENCY MEDICINE | Admitting: INTERNAL MEDICINE
Payer: COMMERCIAL

## 2024-11-27 ENCOUNTER — APPOINTMENT (OUTPATIENT)
Dept: ULTRASOUND IMAGING | Facility: CLINIC | Age: 24
End: 2024-11-27
Attending: STUDENT IN AN ORGANIZED HEALTH CARE EDUCATION/TRAINING PROGRAM
Payer: COMMERCIAL

## 2024-11-27 DIAGNOSIS — Z86.73 HISTORY OF STROKE: ICD-10-CM

## 2024-11-27 DIAGNOSIS — J45.40 MODERATE PERSISTENT ASTHMA WITHOUT COMPLICATION: ICD-10-CM

## 2024-11-27 DIAGNOSIS — G89.29 OTHER CHRONIC PAIN: ICD-10-CM

## 2024-11-27 DIAGNOSIS — R05.9 COUGH, UNSPECIFIED TYPE: ICD-10-CM

## 2024-11-27 DIAGNOSIS — I26.94 MULTIPLE SUBSEGMENTAL PULMONARY EMBOLI WITHOUT ACUTE COR PULMONALE (H): ICD-10-CM

## 2024-11-27 DIAGNOSIS — R09.81 NASAL CONGESTION: ICD-10-CM

## 2024-11-27 DIAGNOSIS — J96.01 ACUTE RESPIRATORY FAILURE WITH HYPOXIA (H): Primary | ICD-10-CM

## 2024-11-27 DIAGNOSIS — R06.02 SHORTNESS OF BREATH: ICD-10-CM

## 2024-11-27 DIAGNOSIS — R09.02 HYPOXIA: ICD-10-CM

## 2024-11-27 DIAGNOSIS — D57.00 SICKLE CELL PAIN CRISIS (H): ICD-10-CM

## 2024-11-27 DIAGNOSIS — D57.00 SICKLE CELL DISEASE WITH CRISIS (H): ICD-10-CM

## 2024-11-27 DIAGNOSIS — I63.9 CEREBRAL INFARCTION, UNSPECIFIED MECHANISM (H): ICD-10-CM

## 2024-11-27 DIAGNOSIS — K59.00 CONSTIPATION: ICD-10-CM

## 2024-11-27 DIAGNOSIS — R06.09 DYSPNEA ON EXERTION: ICD-10-CM

## 2024-11-27 DIAGNOSIS — R07.9 CHEST PAIN, UNSPECIFIED TYPE: ICD-10-CM

## 2024-11-27 DIAGNOSIS — F32.A DEPRESSIVE DISORDER: ICD-10-CM

## 2024-11-27 DIAGNOSIS — M54.9 DORSALGIA: ICD-10-CM

## 2024-11-27 LAB
ANION GAP SERPL CALCULATED.3IONS-SCNC: 12 MMOL/L (ref 7–15)
BASOPHILS # BLD AUTO: 0.2 10E3/UL (ref 0–0.2)
BASOPHILS NFR BLD AUTO: 2 %
BUN SERPL-MCNC: 10.1 MG/DL (ref 6–20)
CALCIUM SERPL-MCNC: 9.2 MG/DL (ref 8.8–10.4)
CHLORIDE SERPL-SCNC: 102 MMOL/L (ref 98–107)
CREAT SERPL-MCNC: 0.47 MG/DL (ref 0.51–0.95)
CREAT SERPL-MCNC: 0.47 MG/DL (ref 0.51–0.95)
EGFRCR SERPLBLD CKD-EPI 2021: >90 ML/MIN/1.73M2
EGFRCR SERPLBLD CKD-EPI 2021: >90 ML/MIN/1.73M2
EOSINOPHIL # BLD AUTO: 0.4 10E3/UL (ref 0–0.7)
EOSINOPHIL NFR BLD AUTO: 3 %
ERYTHROCYTE [DISTWIDTH] IN BLOOD BY AUTOMATED COUNT: 22.2 % (ref 10–15)
FLUAV RNA SPEC QL NAA+PROBE: NEGATIVE
FLUBV RNA RESP QL NAA+PROBE: NEGATIVE
GLUCOSE SERPL-MCNC: 84 MG/DL (ref 70–99)
HCG SERPL QL: NEGATIVE
HCO3 SERPL-SCNC: 21 MMOL/L (ref 22–29)
HCT VFR BLD AUTO: 20.5 % (ref 35–47)
HGB BLD-MCNC: 7.2 G/DL (ref 11.7–15.7)
IMM GRANULOCYTES # BLD: 0.1 10E3/UL
IMM GRANULOCYTES NFR BLD: 1 %
LYMPHOCYTES # BLD AUTO: 1.7 10E3/UL (ref 0.8–5.3)
LYMPHOCYTES NFR BLD AUTO: 13 %
MCH RBC QN AUTO: 28.6 PG (ref 26.5–33)
MCHC RBC AUTO-ENTMCNC: 35.1 G/DL (ref 31.5–36.5)
MCV RBC AUTO: 81 FL (ref 78–100)
MONOCYTES # BLD AUTO: 1.1 10E3/UL (ref 0–1.3)
MONOCYTES NFR BLD AUTO: 8 %
NEUTROPHILS # BLD AUTO: 9.4 10E3/UL (ref 1.6–8.3)
NEUTROPHILS NFR BLD AUTO: 74 %
NRBC # BLD AUTO: 0.1 10E3/UL
NRBC BLD AUTO-RTO: 1 /100
PLATELET # BLD AUTO: 442 10E3/UL (ref 150–450)
POTASSIUM SERPL-SCNC: 3.8 MMOL/L (ref 3.4–5.3)
RBC # BLD AUTO: 2.52 10E6/UL (ref 3.8–5.2)
RETICS # AUTO: 0.47 10E6/UL (ref 0.03–0.1)
RETICS/RBC NFR AUTO: 19.7 % (ref 0.5–2)
RSV RNA SPEC NAA+PROBE: NEGATIVE
SARS-COV-2 RNA RESP QL NAA+PROBE: NEGATIVE
SODIUM SERPL-SCNC: 135 MMOL/L (ref 135–145)
WBC # BLD AUTO: 12.8 10E3/UL (ref 4–11)

## 2024-11-27 PROCEDURE — 96361 HYDRATE IV INFUSION ADD-ON: CPT | Performed by: EMERGENCY MEDICINE

## 2024-11-27 PROCEDURE — 250N000013 HC RX MED GY IP 250 OP 250 PS 637: Performed by: STUDENT IN AN ORGANIZED HEALTH CARE EDUCATION/TRAINING PROGRAM

## 2024-11-27 PROCEDURE — 93010 ELECTROCARDIOGRAM REPORT: CPT | Performed by: EMERGENCY MEDICINE

## 2024-11-27 PROCEDURE — 80048 BASIC METABOLIC PNL TOTAL CA: CPT | Performed by: PHYSICIAN ASSISTANT

## 2024-11-27 PROCEDURE — 250N000011 HC RX IP 250 OP 636: Performed by: PHYSICIAN ASSISTANT

## 2024-11-27 PROCEDURE — 93005 ELECTROCARDIOGRAM TRACING: CPT | Performed by: EMERGENCY MEDICINE

## 2024-11-27 PROCEDURE — 96375 TX/PRO/DX INJ NEW DRUG ADDON: CPT | Performed by: EMERGENCY MEDICINE

## 2024-11-27 PROCEDURE — 99285 EMERGENCY DEPT VISIT HI MDM: CPT | Mod: 25 | Performed by: EMERGENCY MEDICINE

## 2024-11-27 PROCEDURE — 93970 EXTREMITY STUDY: CPT

## 2024-11-27 PROCEDURE — 96372 THER/PROPH/DIAG INJ SC/IM: CPT | Performed by: PHYSICIAN ASSISTANT

## 2024-11-27 PROCEDURE — 36415 COLL VENOUS BLD VENIPUNCTURE: CPT | Performed by: PHYSICIAN ASSISTANT

## 2024-11-27 PROCEDURE — 258N000003 HC RX IP 258 OP 636: Performed by: PHYSICIAN ASSISTANT

## 2024-11-27 PROCEDURE — 85004 AUTOMATED DIFF WBC COUNT: CPT | Performed by: PHYSICIAN ASSISTANT

## 2024-11-27 PROCEDURE — 96376 TX/PRO/DX INJ SAME DRUG ADON: CPT | Performed by: EMERGENCY MEDICINE

## 2024-11-27 PROCEDURE — 84703 CHORIONIC GONADOTROPIN ASSAY: CPT | Performed by: PHYSICIAN ASSISTANT

## 2024-11-27 PROCEDURE — 96374 THER/PROPH/DIAG INJ IV PUSH: CPT | Performed by: EMERGENCY MEDICINE

## 2024-11-27 PROCEDURE — 82565 ASSAY OF CREATININE: CPT | Performed by: PHYSICIAN ASSISTANT

## 2024-11-27 PROCEDURE — 99285 EMERGENCY DEPT VISIT HI MDM: CPT | Mod: FS | Performed by: EMERGENCY MEDICINE

## 2024-11-27 PROCEDURE — 71250 CT THORAX DX C-: CPT

## 2024-11-27 PROCEDURE — 120N000002 HC R&B MED SURG/OB UMMC

## 2024-11-27 PROCEDURE — 250N000011 HC RX IP 250 OP 636: Performed by: STUDENT IN AN ORGANIZED HEALTH CARE EDUCATION/TRAINING PROGRAM

## 2024-11-27 PROCEDURE — 85045 AUTOMATED RETICULOCYTE COUNT: CPT | Performed by: PHYSICIAN ASSISTANT

## 2024-11-27 PROCEDURE — 87637 SARSCOV2&INF A&B&RSV AMP PRB: CPT | Performed by: PHYSICIAN ASSISTANT

## 2024-11-27 PROCEDURE — 99223 1ST HOSP IP/OBS HIGH 75: CPT | Performed by: STUDENT IN AN ORGANIZED HEALTH CARE EDUCATION/TRAINING PROGRAM

## 2024-11-27 RX ORDER — NALOXONE HYDROCHLORIDE 0.4 MG/ML
0.2 INJECTION, SOLUTION INTRAMUSCULAR; INTRAVENOUS; SUBCUTANEOUS
Status: DISCONTINUED | OUTPATIENT
Start: 2024-11-27 | End: 2024-12-04 | Stop reason: HOSPADM

## 2024-11-27 RX ORDER — CALCIUM CARBONATE 500 MG/1
1000 TABLET, CHEWABLE ORAL 4 TIMES DAILY PRN
Status: DISCONTINUED | OUTPATIENT
Start: 2024-11-27 | End: 2024-12-04 | Stop reason: HOSPADM

## 2024-11-27 RX ORDER — AMOXICILLIN 250 MG
2 CAPSULE ORAL 2 TIMES DAILY PRN
Status: DISCONTINUED | OUTPATIENT
Start: 2024-11-27 | End: 2024-12-04 | Stop reason: HOSPADM

## 2024-11-27 RX ORDER — KETOROLAC TROMETHAMINE 30 MG/ML
30 INJECTION, SOLUTION INTRAMUSCULAR; INTRAVENOUS ONCE
Status: COMPLETED | OUTPATIENT
Start: 2024-11-27 | End: 2024-11-27

## 2024-11-27 RX ORDER — ENOXAPARIN SODIUM 100 MG/ML
1 INJECTION SUBCUTANEOUS EVERY 12 HOURS
Status: DISCONTINUED | OUTPATIENT
Start: 2024-11-27 | End: 2024-11-30

## 2024-11-27 RX ORDER — HYDROXYUREA 500 MG/1
3000 CAPSULE ORAL DAILY
Status: DISCONTINUED | OUTPATIENT
Start: 2024-11-28 | End: 2024-12-04 | Stop reason: HOSPADM

## 2024-11-27 RX ORDER — METHOCARBAMOL 750 MG/1
750 TABLET, FILM COATED ORAL 4 TIMES DAILY PRN
Status: DISCONTINUED | OUTPATIENT
Start: 2024-11-27 | End: 2024-12-04 | Stop reason: HOSPADM

## 2024-11-27 RX ORDER — AMOXICILLIN 250 MG
1 CAPSULE ORAL 2 TIMES DAILY PRN
Status: DISCONTINUED | OUTPATIENT
Start: 2024-11-27 | End: 2024-12-04 | Stop reason: HOSPADM

## 2024-11-27 RX ORDER — DEFERASIROX 360 MG/1
1440 TABLET, FILM COATED ORAL DAILY
Status: DISCONTINUED | OUTPATIENT
Start: 2024-11-28 | End: 2024-11-28

## 2024-11-27 RX ORDER — FLUTICASONE FUROATE AND VILANTEROL 200; 25 UG/1; UG/1
1 POWDER RESPIRATORY (INHALATION) DAILY
Status: DISCONTINUED | OUTPATIENT
Start: 2024-11-28 | End: 2024-12-04 | Stop reason: HOSPADM

## 2024-11-27 RX ORDER — OXYCODONE HYDROCHLORIDE 10 MG/1
10 TABLET ORAL EVERY 6 HOURS PRN
Status: DISCONTINUED | OUTPATIENT
Start: 2024-11-27 | End: 2024-12-04 | Stop reason: HOSPADM

## 2024-11-27 RX ORDER — ALBUTEROL SULFATE 0.83 MG/ML
5 SOLUTION RESPIRATORY (INHALATION) EVERY 6 HOURS PRN
Status: DISCONTINUED | OUTPATIENT
Start: 2024-11-27 | End: 2024-12-04 | Stop reason: HOSPADM

## 2024-11-27 RX ORDER — NALOXONE HYDROCHLORIDE 0.4 MG/ML
0.4 INJECTION, SOLUTION INTRAMUSCULAR; INTRAVENOUS; SUBCUTANEOUS
Status: DISCONTINUED | OUTPATIENT
Start: 2024-11-27 | End: 2024-12-04 | Stop reason: HOSPADM

## 2024-11-27 RX ORDER — DEFERASIROX 360 MG/1
1440 TABLET, FILM COATED ORAL DAILY
Status: DISCONTINUED | OUTPATIENT
Start: 2024-11-28 | End: 2024-11-27

## 2024-11-27 RX ORDER — ASPIRIN 81 MG/1
81 TABLET, CHEWABLE ORAL 2 TIMES DAILY
Status: DISCONTINUED | OUTPATIENT
Start: 2024-11-28 | End: 2024-12-04 | Stop reason: HOSPADM

## 2024-11-27 RX ORDER — LIDOCAINE 40 MG/G
CREAM TOPICAL
Status: DISCONTINUED | OUTPATIENT
Start: 2024-11-27 | End: 2024-12-04 | Stop reason: HOSPADM

## 2024-11-27 RX ORDER — GABAPENTIN 300 MG/1
600 CAPSULE ORAL AT BEDTIME
Status: DISCONTINUED | OUTPATIENT
Start: 2024-11-27 | End: 2024-12-04 | Stop reason: HOSPADM

## 2024-11-27 RX ADMIN — GABAPENTIN 600 MG: 300 CAPSULE ORAL at 21:03

## 2024-11-27 RX ADMIN — HYDROMORPHONE HYDROCHLORIDE 1 MG: 1 INJECTION, SOLUTION INTRAMUSCULAR; INTRAVENOUS; SUBCUTANEOUS at 15:09

## 2024-11-27 RX ADMIN — HYDROMORPHONE HYDROCHLORIDE 1 MG: 1 INJECTION, SOLUTION INTRAMUSCULAR; INTRAVENOUS; SUBCUTANEOUS at 14:00

## 2024-11-27 RX ADMIN — HYDROMORPHONE HYDROCHLORIDE 1 MG: 1 INJECTION, SOLUTION INTRAMUSCULAR; INTRAVENOUS; SUBCUTANEOUS at 16:14

## 2024-11-27 RX ADMIN — HYDROMORPHONE HYDROCHLORIDE 1 MG: 1 INJECTION, SOLUTION INTRAMUSCULAR; INTRAVENOUS; SUBCUTANEOUS at 19:39

## 2024-11-27 RX ADMIN — SODIUM CHLORIDE, POTASSIUM CHLORIDE, SODIUM LACTATE AND CALCIUM CHLORIDE 1000 ML: 600; 310; 30; 20 INJECTION, SOLUTION INTRAVENOUS at 14:00

## 2024-11-27 RX ADMIN — HYDROMORPHONE HYDROCHLORIDE 1 MG: 1 INJECTION, SOLUTION INTRAMUSCULAR; INTRAVENOUS; SUBCUTANEOUS at 21:03

## 2024-11-27 RX ADMIN — ENOXAPARIN SODIUM 70 MG: 80 INJECTION SUBCUTANEOUS at 16:09

## 2024-11-27 RX ADMIN — KETOROLAC TROMETHAMINE 30 MG: 30 INJECTION, SOLUTION INTRAMUSCULAR at 14:00

## 2024-11-27 ASSESSMENT — ENCOUNTER SYMPTOMS: SICKLE CELL PAIN: 1

## 2024-11-27 ASSESSMENT — ACTIVITIES OF DAILY LIVING (ADL)
ADLS_ACUITY_SCORE: 58

## 2024-11-27 NOTE — ED PROVIDER NOTES
ED Provider Note  [facility]      History     Chief Complaint   Patient presents with    Sickle Cell Pain Crisis     Pt has pain in lower back       Sickle Cell Pain Crisis    25yo F pmhx PE not on AC and SCD c/b CVA p/w sickle cell crisis pain and cough.  Patient states she attempted into the infusion center, but they had no openings.  She states she has diffuse back pain, this is consistent with her sickle crisis.  She additionally notes, that for the past few days she has had a productive cough and rhinorrhea, with associated CP when coughing and SOB with exertion.  No fever, chills, sore throat, HA, myalgias.    Past Medical History  Past Medical History:   Diagnosis Date    Anxiety     Bleeding disorder (H)     Blood clotting disorder (H)     Cerebral infarction (H) 2015    Cognitive developmental delay     low IQ. 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 side (H)     right hand contractures    Iron overload due to repeated red blood cell transfusions     Migraines     Multiple subsegmental pulmonary emboli without acute cor pulmonale (H) 02/01/2021    Oppositional defiant behavior     Presence of intrauterine contraceptive device 2/18/2020    Superficial venous thrombosis of arm, right 03/25/2021    Uncomplicated asthma      Past Surgical History:   Procedure Laterality Date    AS INSERT TUNNELED CV 2 CATH W/O PORT/PUMP      CHOLECYSTECTOMY      CV RIGHT HEART CATH MEASUREMENTS RECORDED N/A 11/18/2021    Procedure: Right Heart Cath;  Surgeon: Jackson Stauffer MD;  Location:  HEART CARDIAC CATH LAB    INSERT PORT VASCULAR ACCESS Left 4/21/2021    Procedure: INSERTION, VASCULAR ACCESS PORT (NOT SURE ON SIDE UNTIL REMOVAL);  Surgeon: Rajan More MD;  [place] [facility] [place]    IR CHEST PORT PLACEMENT > 5 YRS OF AGE  4/21/2021    IR CVC NON TUNNEL LINE REMOVAL   5/6/2021    IR CVC NON TUNNEL PLACEMENT > 5 YRS  04/07/2020    IR CVC NON TUNNEL PLACEMENT > 5 YRS  4/30/2021    IR CVC NON TUNNEL PLACEMENT > 5 YRS  9/7/2022    IR PORT REMOVAL LEFT  4/21/2021    REMOVE PORT VASCULAR ACCESS Left 4/21/2021    Procedure: REMOVAL, VASCULAR ACCESS PORT LEFT;  Surgeon: Rajan More MD;  Location: [facility] OR    REPAIR TENDON ELBOW Right 10/02/2019    Procedure: Right Elbow Flexor Lengthening, Flexor Pronator Slide Of Wrist and Finger, Thumb Adductor Lengthening;  Surgeon: Anai Franco MD;  Location: [facility] OR    TONSILLECTOMY Bilateral 10/02/2019    Procedure: Bilateral Tonsillectomy;  Surgeon: Farhana Guy MD;  Location: [facility] OR    ZZC BREAST REDUCTION (INCLUDES LIPO) TIER 3 Bilateral 04/23/2019     aspirin (ASA) 81 MG chewable tablet  budesonide-formoterol (SYMBICORT) 160-4.5 MCG/ACT Inhaler  deferasirox (JADENU) 360 MG tablet  Deferiprone, Twice Daily, 1000 MG TABS  diphenhydrAMINE (BENADRYL) 50 MG capsule  gabapentin (NEURONTIN) 300 MG capsule  hydroxyurea (HYDREA) 500 MG capsule  ibuprofen (ADVIL/MOTRIN) 800 MG tablet  methocarbamol (ROBAXIN) 750 MG tablet  oxyCODONE IR (ROXICODONE) 10 MG tablet  albuterol (PROVENTIL) (2.5 MG/3ML) 0.083% neb solution  EPINEPHrine (ANY BX GENERIC EQUIV) 0.3 MG/0.3ML injection 2-pack  melatonin 5 MG tablet  methylPREDNISolone (MEDROL) 32 MG tablet  naloxone (NARCAN) 4 MG/0.1ML nasal spray  ondansetron (ZOFRAN ODT) 8 MG ODT tab      Allergies   Allergen Reactions    Contrast Dye Angioedema     Hives and breathing issues    Fish-Derived Products Hives    Seafood Hives    Adhesive Tape Hives     Primipore dressing causes hives    Gadolinium     Iodinated Contrast Media      Family History  Family History   Problem Relation Age of Onset    Sickle Cell Trait Mother     Hypertension Mother     Asthma Mother     Sickle Cell Trait Father     Glaucoma No family hx of     Macular Degeneration No family hx of     Diabetes No family hx of      "Gout No family hx of      Social History   Social History     Tobacco Use    Smoking status: Never     Passive exposure: Never    Smokeless tobacco: Never   Substance Use Topics    Alcohol use: Not Currently     Alcohol/week: 0.0 standard drinks of alcohol    Drug use: Never      A medically appropriate review of systems was performed with pertinent positives and negatives noted in the HPI, and all other systems negative.    Physical Exam   BP: 129/88  Pulse: 98  Temp: 98.5  F (36.9  C)  Resp: 18  Height: 162.6 cm (5' 4\")  Weight: 70.8 kg (156 lb)  SpO2: 93 %  Physical Exam  Constitutional:       General: She is not in acute distress.     Appearance: Normal appearance. She is not diaphoretic.   HENT:      Head: Atraumatic.      Mouth/Throat:      Mouth: Mucous membranes are moist.   Eyes:      Conjunctiva/sclera: Conjunctivae normal.   Cardiovascular:      Rate and Rhythm: Normal rate.   Pulmonary:      Effort: No respiratory distress.   Abdominal:      General: Abdomen is flat.   Musculoskeletal:      Cervical back: Neck supple.   Skin:     General: Skin is warm.   Neurological:      Mental Status: She is alert.           ED Course, Procedures, & Data      Procedures            EKG Interpretation:      Interpreted by Danyel Blackburn PA-C  Time reviewed: 1415  Symptoms at time of EKG: CP   Rhythm: normal sinus   Rate: normal  Axis: normal  Ectopy: none  Conduction: normal  ST Segments/ T Waves: No ST-T wave changes  Q Waves: none  Comparison to prior: Unchanged    Clinical Impression: normal EKG           Results for orders placed or performed during the hospital encounter of 11/27/24   CT Chest w/o Contrast     Status: None    Narrative    CT CHEST W/O CONTRAST 11/27/2024 2:42 PM    CLINICAL HISTORY: Sickle cell pain crisis, cough, R/o consolidation or  acute chest    TECHNIQUE: CT chest without IV contrast. Multiplanar reformats were  obtained. Dose reduction techniques were used.  CONTRAST: None.    COMPARISON: " Chest radiograph 11/13/2024    FINDINGS:   LUNGS AND PLEURA: Lungs are clear. No pleural effusion or  pneumothorax.    MEDIASTINUM/AXILLAE: Small volume residual thymus in the anterior  mediastinum. No definite lymphadenopathy by size criteria on this  noncontrast exam. Left chest port with tip near the cavoatrial  junction. No thoracic aortic aneurysm.    CORONARY ARTERY CALCIFICATION: None.    UPPER ABDOMEN: Dense hepatic parenchyma, can be seen with iron  deposition. Atrophic spleen.    MUSCULOSKELETAL: No acute bony abnormality. Likely chronic bony  changes of sickle cell disease.      Impression    IMPRESSION:   1.  No visualized explanation for patient's symptoms.    CHIP PABLO MD         SYSTEM ID:  [id number]   Influenza A/B, RSV and SARS-CoV2 PCR (COVID-19) Nasopharyngeal     Status: Normal    Specimen: Nasopharyngeal; Swab   Result Value Ref Range    Influenza A PCR Negative Negative    Influenza B PCR Negative Negative    RSV PCR Negative Negative    SARS CoV2 PCR Negative Negative    Narrative    Testing was performed using the Xpert Xpress CoV2/Flu/RSV Assay on the [employer] GeneXpert Instrument. This test should be ordered for the detection of SARS-CoV-2, influenza, and RSV viruses in individuals who meet clinical and/or epidemiological criteria. Test performance is unknown in asymptomatic patients. This test is for in vitro diagnostic use under the FDA EUA for laboratories certified under CLIA to perform high or moderate complexity testing. This test has not been FDA cleared or approved. A negative result does not rule out the presence of PCR inhibitors in the specimen or target RNA in concentration below the limit of detection for the assay. If only one viral target is positive but coinfection with multiple targets is suspected, the sample should be re-tested with another FDA cleared, approved, or authorized test, if coinfection would change clinical management. This test was validated by the [facility]. These laboratories are certified under the Clinical Laboratory Improvement Amendments of 1988 (CLIA-88) as qualified to perform high complexity laboratory testing.   Reticulocyte count     Status: Abnormal   Result Value Ref Range    % Reticulocyte 19.7 (H) 0.5 - 2.0 %    Absolute Reticulocyte 0.472 (H) 0.025 - 0.095 10e6/uL   Basic metabolic panel     Status: Abnormal   Result Value Ref Range    Sodium 135 135 - 145 mmol/L    Potassium 3.8 3.4 - 5.3 mmol/L    Chloride 102 98 - 107 mmol/L    Carbon Dioxide (CO2) 21 (L) 22 - 29 mmol/L    Anion Gap 12 7 - 15 mmol/L    Urea Nitrogen 10.1 6.0 - 20.0 mg/dL    Creatinine 0.47 (L) 0.51 - 0.95 mg/dL    GFR Estimate >90 >60 mL/min/1.73m2    Calcium 9.2 8.8 - 10.4 mg/dL    Glucose 84 70 - 99 mg/dL   HCG qualitative pregnancy (blood)     Status: Normal   Result Value Ref Range    hCG Serum Qualitative Negative Negative   CBC with platelets and differential     Status: Abnormal   Result Value Ref Range    WBC Count 12.8 (H) 4.0 - 11.0 10e3/uL    RBC Count 2.52 (L) 3.80 - 5.20 10e6/uL    Hemoglobin 7.2 (L) 11.7 - 15.7 g/dL    Hematocrit 20.5 (L) 35.0 - 47.0 %    MCV 81 78 - 100 fL    MCH 28.6 26.5 - 33.0 pg    MCHC 35.1 31.5 - 36.5 g/dL    RDW 22.2 (H) 10.0 - 15.0 %    Platelet Count 442 150 - 450 10e3/uL    % Neutrophils 74 %    % Lymphocytes 13 %    % Monocytes 8 %    % Eosinophils 3 %    % Basophils 2 %    % Immature Granulocytes 1 %    NRBCs per 100 WBC 1 (H) <1 /100    Absolute Neutrophils 9.4 (H) 1.6 - 8.3 10e3/uL    Absolute Lymphocytes 1.7 0.8 - 5.3 10e3/uL    Absolute Monocytes 1.1 0.0 - 1.3 10e3/uL    Absolute Eosinophils 0.4 0.0 - 0.7 10e3/uL    Absolute Basophils 0.2 0.0 - 0.2 10e3/uL    Absolute Immature Granulocytes 0.1 <=0.4 10e3/uL    Absolute NRBCs 0.1 10e3/uL   EKG 12-lead, tracing only     Status: None (Preliminary result)   Result Value Ref Range    Systolic Blood Pressure  mmHg    Diastolic Blood Pressure  mmHg    Ventricular  Rate 88 BPM    Atrial Rate 88 BPM    MI Interval 170 ms    QRS Duration 74 ms     ms    QTc 479 ms    P Axis 73 degrees    R AXIS 44 degrees    T Axis 15 degrees    Interpretation ECG Sinus rhythm  Normal ECG      CBC with platelets differential     Status: Abnormal    Narrative    The following orders were created for panel order CBC with platelets differential.  Procedure                               Abnormality         Status                     ---------                               -----------         ------                     CBC with platelets and d...[[id number]]  Abnormal            Final result                 Please view results for these tests on the individual orders.     Medications   HYDROmorphone (DILAUDID) injection 1 mg (has no administration in time range)   enoxaparin ANTICOAGULANT (LOVENOX) injection 70 mg (has no administration in time range)   HYDROmorphone (DILAUDID) injection 1 mg (1 mg Intravenous $Given 11/27/24 1400)   ketorolac (TORADOL) injection 30 mg (30 mg Intravenous $Given 11/27/24 1400)   lactated ringers BOLUS 1,000 mL (1,000 mLs Intravenous $New Bag 11/27/24 1400)   HYDROmorphone (DILAUDID) injection 1 mg (1 mg Intravenous $Given 11/27/24 1509)     Labs Ordered and Resulted from Time of ED Arrival to Time of ED Departure   RETICULOCYTE COUNT - Abnormal       Result Value    % Reticulocyte 19.7 (*)     Absolute Reticulocyte 0.472 (*)    BASIC METABOLIC PANEL - Abnormal    Sodium 135      Potassium 3.8      Chloride 102      Carbon Dioxide (CO2) 21 (*)     Anion Gap 12      Urea Nitrogen 10.1      Creatinine 0.47 (*)     GFR Estimate >90      Calcium 9.2      Glucose 84     CBC WITH PLATELETS AND DIFFERENTIAL - Abnormal    WBC Count 12.8 (*)     RBC Count 2.52 (*)     Hemoglobin 7.2 (*)     Hematocrit 20.5 (*)     MCV 81      MCH 28.6      MCHC 35.1      RDW 22.2 (*)     Platelet Count 442      % Neutrophils 74      % Lymphocytes 13      % Monocytes 8      % Eosinophils 3       % Basophils 2      % Immature Granulocytes 1      NRBCs per 100 WBC 1 (*)     Absolute Neutrophils 9.4 (*)     Absolute Lymphocytes 1.7      Absolute Monocytes 1.1      Absolute Eosinophils 0.4      Absolute Basophils 0.2      Absolute Immature Granulocytes 0.1      Absolute NRBCs 0.1     INFLUENZA A/B, RSV AND SARS-COV2 PCR - Normal    Influenza A PCR Negative      Influenza B PCR Negative      RSV PCR Negative      SARS CoV2 PCR Negative     HCG QUALITATIVE PREGNANCY - Normal    hCG Serum Qualitative Negative     PLATELET COUNT   CREATININE     CT Chest w/o Contrast   Final Result   IMPRESSION:    1.  No visualized explanation for patient's symptoms.      CHIP PABLO MD            SYSTEM ID:  [id number]             Critical care was not performed.     Medical Decision Making  The patient's presentation was of high complexity (a chronic illness severe exacerbation, progression, or side effect of treatment).    The patient's evaluation involved:  review of external note(s) from 3+ sources (see separate area of note for details)  review of 3+ test result(s) ordered prior to this encounter (see separate area of note for details)  strong consideration of a test (see separate area of note for details) that was ultimately deferred  ordering and/or review of 3+ test(s) in this encounter (see separate area of note for details)  discussion of management or test interpretation with another health professional (hospitalist)    The patient's management necessitated high risk (a decision regarding hospitalization).    Assessment & Plan    25yo F pmhx PE not on AC and SCD c/b CVA p/w sickle cell crisis pain diffusely of back x today and productive cough x few days without fevers. Denies  CP, but does have SOB.  In ED, patient does not appear in respiratory stress, but does have notable hypoxemia to the high 80s on room air.  This improves the high 90s on 2 L oxygen.  She is not tachycardic.  DDx COVID versus flu versus  other viral syndrome versus PNA versus PE versus sickle crisis pain versus other a Noncon CT chest was obtained to assess for occult pneumonia versus acute chest and negative.  COVID, flu, RSV negative.  Patient has a roughly baseline hemoglobin at 7.2, with mild leukocytosis of 12.8.  Her BMP is unremarkable.  Pregnancy negative.  Elevated retake count at 19.7.  Given patient's history of PE, and her subjective shortness of breath, and objective hypoxemia here, concern for PE.  However, patient has a documented angioedema reaction to contrast dye.  As such, she was empirically treated with Lovenox, and will be admitted for facilitation of V/Q scan to further assess.  Regarding patient's sickle crisis pain, she received 30 mg ketorolac, and a total of 3 mg IV Dilaudid in separate doses.     I have reviewed the nursing notes. I have reviewed the findings, diagnosis, plan and need for follow up with the patient.    New Prescriptions    No medications on file       Final diagnoses:   Sickle cell disease with crisis (H)   Hypoxia   Dyspnea on exertion         [facility] EMERGENCY DEPARTMENT  11/27/2024     Danyel Blackburn PA-C  11/27/24 0879     (SENOKOT-S/PERICOLACE) 8.6-50 MG per tablet 1 tablet (has no administration in time range)     Or   senna-docusate (SENOKOT-S/PERICOLACE) 8.6-50 MG per tablet 2 tablet (has no administration in time range)   calcium carbonate (TUMS) chewable tablet 1,000 mg (has no administration in time range)   Patient is already receiving anticoagulation with heparin, enoxaparin (LOVENOX), warfarin (COUMADIN)  or other anticoagulant medication (has no administration in time range)   albuterol (PROVENTIL) neb solution 5 mg (has no administration in time range)   aspirin (ASA) chewable tablet 81 mg (81 mg Oral $Given 12/1/24 2042)   fluticasone-vilanterol (BREO ELLIPTA) 200-25 MCG/ACT inhaler 1 puff (1 puff Inhalation Not Given 12/1/24 0831)   Deferiprone (Twice Daily) TABS 2,000 mg ( Oral Automatically Held 12/6/24 2000)   gabapentin (NEURONTIN) capsule 600 mg (600 mg Oral $Given 11/30/24 2207)   hydroxyurea (HYDREA) capsule 3,000 mg (3,000 mg Oral $Given 12/1/24 0823)   methocarbamol (ROBAXIN) tablet 750 mg (has no administration in time range)   oxyCODONE IR (ROXICODONE) tablet 10 mg (has no administration in time range)   naloxone (NARCAN) injection 0.2 mg (has no administration in time range)     Or   naloxone (NARCAN) injection 0.4 mg (has no administration in time range)     Or   naloxone (NARCAN) injection 0.2 mg (has no administration in time range)     Or   naloxone (NARCAN) injection 0.4 mg (has no administration in time range)   HYDROmorphone (DILAUDID) injection 1 mg (1 mg Intravenous $Given 12/1/24 2043)   ondansetron (ZOFRAN) injection 4 mg (4 mg Intravenous $Given 12/1/24 1514)   acetaminophen (TYLENOL) tablet 500 mg (500 mg Oral $Given 11/30/24 1321)   predniSONE (DELTASONE) tablet 20 mg (20 mg Oral $Given 12/1/24 0823)   HYDROmorphone (DILAUDID) injection 1 mg (1 mg Intravenous $Given 11/27/24 1400)   ketorolac (TORADOL) injection 30 mg (30 mg Intravenous $Given 11/27/24 1400)   lactated ringers BOLUS 1,000 mL (0  mLs Intravenous Stopped 11/27/24 1609)   HYDROmorphone (DILAUDID) injection 1 mg (1 mg Intravenous $Given 11/27/24 1509)   HYDROmorphone (DILAUDID) injection 1 mg (1 mg Intravenous $Given 11/27/24 1614)   HYDROmorphone (DILAUDID) injection 1 mg (1 mg Intravenous $Given 11/27/24 2103)   technetium pentetate Tc99m (DTPA) inhaled radioisotope 2 millicurie (2 millicuries Inhalation $Given 11/29/24 1415)   technetium pertechnetate with albumin (Tc99m MAA) radioisotope injection 7 millicurie (6.5 millicuries Intravenous $Given 11/29/24 1428)     Labs Ordered and Resulted from Time of ED Arrival to Time of ED Departure   RETICULOCYTE COUNT - Abnormal       Result Value    % Reticulocyte 19.7 (*)     Absolute Reticulocyte 0.472 (*)    BASIC METABOLIC PANEL - Abnormal    Sodium 135      Potassium 3.8      Chloride 102      Carbon Dioxide (CO2) 21 (*)     Anion Gap 12      Urea Nitrogen 10.1      Creatinine 0.47 (*)     GFR Estimate >90      Calcium 9.2      Glucose 84     CBC WITH PLATELETS AND DIFFERENTIAL - Abnormal    WBC Count 12.8 (*)     RBC Count 2.52 (*)     Hemoglobin 7.2 (*)     Hematocrit 20.5 (*)     MCV 81      MCH 28.6      MCHC 35.1      RDW 22.2 (*)     Platelet Count 442      % Neutrophils 74      % Lymphocytes 13      % Monocytes 8      % Eosinophils 3      % Basophils 2      % Immature Granulocytes 1      NRBCs per 100 WBC 1 (*)     Absolute Neutrophils 9.4 (*)     Absolute Lymphocytes 1.7      Absolute Monocytes 1.1      Absolute Eosinophils 0.4      Absolute Basophils 0.2      Absolute Immature Granulocytes 0.1      Absolute NRBCs 0.1     CREATININE - Abnormal    Creatinine 0.47 (*)     GFR Estimate >90     INFLUENZA A/B, RSV AND SARS-COV2 PCR - Normal    Influenza A PCR Negative      Influenza B PCR Negative      RSV PCR Negative      SARS CoV2 PCR Negative     HCG QUALITATIVE PREGNANCY - Normal    hCG Serum Qualitative Negative       NM Lung Scan Ventilation and Perfusion   Final Result   Impression:       Pulmonary emboli is not present.      I have personally reviewed the examination and initial interpretation   and I agree with the findings.      ANGEL WHITE MD            SYSTEM ID:  [id number]      US Lower Extremity Venous Duplex Bilateral   Final Result   IMPRESSION:   No deep venous thrombosis in the bilateral lower extremities.      CT Chest w/o Contrast   Final Result   IMPRESSION:    1.  No visualized explanation for patient's symptoms.      CHIP PABLO MD            SYSTEM ID:  [id number]             Critical care was not performed.     Medical Decision Making  The patient's presentation was of high complexity (a chronic illness severe exacerbation, progression, or side effect of treatment).    The patient's evaluation involved:  review of external note(s) from 3+ sources (see separate area of note for details)  review of 3+ test result(s) ordered prior to this encounter (see separate area of note for details)  strong consideration of a test (see separate area of note for details) that was ultimately deferred  ordering and/or review of 3+ test(s) in this encounter (see separate area of note for details)  discussion of management or test interpretation with another health professional (hospitalist)    The patient's management necessitated high risk (a decision regarding hospitalization).    Assessment & Plan    25yo F pmhx PE not on AC and SCD c/b CVA p/w sickle cell crisis pain diffusely of back x today and productive cough x few days without fevers. Denies  CP, but does have SOB.  In ED, patient does not appear in respiratory stress, but does have notable hypoxemia to the high 80s on room air.  This improves the high 90s on 2 L oxygen.  She is not tachycardic.  DDx COVID versus flu versus other viral syndrome versus PNA versus PE versus sickle crisis pain versus other a Noncon CT chest was obtained to assess for occult pneumonia versus acute chest and negative.  COVID, flu, RSV negative.  Patient  has a roughly baseline hemoglobin at 7.2, with mild leukocytosis of 12.8.  Her BMP is unremarkable.  Pregnancy negative.  Elevated retake count at 19.7.  Given patient's history of PE, and her subjective shortness of breath, and objective hypoxemia here, concern for PE.  However, patient has a documented angioedema reaction to contrast dye.  As such, she was empirically treated with Lovenox, and will be admitted for facilitation of V/Q scan to further assess.  Regarding patient's sickle crisis pain, she received 30 mg ketorolac, and a total of 3 mg IV Dilaudid in separate doses.     I have reviewed the nursing notes. I have reviewed the findings, diagnosis, plan and need for follow up with the patient.    Current Discharge Medication List          Final diagnoses:   Sickle cell disease with crisis (H)   Hypoxia   Dyspnea on exertion         [facility] EMERGENCY DEPARTMENT  11/27/2024     Danyel Blackburn PA-C  11/27/24 1540  ============    --    ED Attending Physician Attestation    I Venancio Nava MD, cared for this patient with the Advanced Practice Provider ([facility]RE[facility]). I personally provided a substantive portion of the care for this patient, including approving the care plan for the number and complexity of problems addressed and taking responsibility related to the risk of complications and/or morbidity or mortality of patient management. Please see the ANDREAS's documentation for full details.    Summary of HPI, PE, ED Course     Assessment and plan  Sickle cell pain crisis.     Given chest pain, differential diagnosis includes pulmonary embolism, pneumothorax, acute chest and pneumonia   -Noncontrast chest CT , given patient's worrisome IV contrast allergy EKG, labs including troponin     Prior to admission, CT chest noncontrast was negative so we will treat for presumed pulmonary embolism given the nature of patient's pain and no other leading cause of discomfort.  Will admit for VQ scan, empirically  anticoagulate and continue to treat pain         Venancio Nava MD  Emergency Medicine        Venancio Nava MD  12/01/24 4946

## 2024-11-27 NOTE — H&P
[facility]    History and Physical - Hospitalist Service, GOLD TEAM        Date of Admission:  11/27/2024    Assessment & Plan      Jennifer Cervantes is a 24 year old F with PMH of PE, not on AC, SCD c/b CVA who p/w sickle cell crisis pain, ORTEGA, and cough.    #Chest pain  #ORTEGA  #Acute hypoxic respiratory failure  #Hx of recurrent PE  #Contrast dye allergy  Hypoxic to 80s on arrival, now satting well on 5 L NC. Prior hx of PE raises concern for recurrence. Unfortunately is allergic to contrast (prior angioedema) and refused CT PA with premedication on d/w ED provider. She confirmed this decision again on my exam, understanding risks regarding VQ scan as an inferior test to CT PA, and was also agreeable to empiric treatment until VQ scan could be obtained. She was started on lovenox in the ED.  - continue empiric lovenox 1 mg/kg bid until imaging is completed  - f/u VQ scan  - f/u BLE doppler --> negative for DVT  - supplemental O2 prn  - mgmt of asthma as below  - if PE workup is negative and ongoing respiratory distress, could consider steroid course for acute asthma exacerbation    #Sickle cell pain crisis  #Sickle cell disease  #Acute back pain  Has a L chest port for simple transfusions (last a few weeks ago) and exchanges as needed. Reports she is on oxycodone 10 mg up to 6x per day at home but usually only takes 3-4x daily. Currently she is responsive to dilaudid IV 1 mg. Follows with Dr. Duncan  - hematology consulted  - continue home oxycodone  - continue IV dilaudid prn for breakthrough pain 1 mg q3h  - continue PTA hydroxyurea  - mgmt of hemochromatosis as below    #Rhinorrhea  #Cough  #Asthma  Covid/flu negative. No s/sxs or concerning findings on CT chest (non-con) for bacterial PNA. Will manage conservatively at this time pending PE workup as above  - continue home inhalers  - outpatient f/u with pulmonology    #Leukocytosis - suspect reactive,  "monitor    #Hemochromatosis - continue PTA deferasirox and deferiprone    #Hx of CVA - continue PTA ASA bid          Diet: Regular Diet Adult  DVT Prophylaxis: Enoxaparin (Lovenox) SQ  Lopez Catheter: Not present  Lines: PRESENT             Cardiac Monitoring: None  Code Status: Full Code    Clinically Significant Risk Factors Present on Admission                 # Drug Induced Platelet Defect: home medication list includes an antiplatelet medication        # Anemia: based on hgb <11       # Overweight: Estimated body mass index is 26.78 kg/m  as calculated from the following:    Height as of this encounter: 1.626 m (5' 4\").    Weight as of this encounter: 70.8 kg (156 lb).         # Financial/Environmental Concerns:    # Asthma: noted on problem list        Disposition Plan     Medically Ready for Discharge: Anticipated in 2-4 Days           Elsy Nunez MD  Hospitalist Service, [facility]  Securely message with [employer] (more info)  Text page via [employer] Paging/Directory   See signed in provider for up to date coverage information    ______________________________________________________________________    Chief Complaint   Back pain  SOB    History is obtained from the patient    History of Present Illness   Jennifer Cervantes is a 24 year old F with PMH of PE, not on AC, SCD c/b CVA who p/w sickle cell crisis pain and cough, ORTEGA.    She had acute onset of diffuse back pain today consistent with prior sickle cell crises in the past.  She called her infusion center but they had no openings at this time.  Therefore she presented to the ED.  Patient also notes    That she has had a productive cough with rhinorrhea and associated chest discomfort with coughing along with dyspnea with exertion.  He denies fevers, chills, sore throat, headache, myalgias.  She denies chest pain currently.    Her breathing has improved since being put on supplemental oxygen as well as " receiving her home inhalers    ED vitals:   /88, afebrile, HR 98, RR 18, 96% O2 on 5 L NC    Labs:  - WBC 12.8  - H/H 7.2/20.5  - bicarb 21    Imaging:   - CT chest unremarkable     EKG:   - RRR, rate 88, no acute ST changes, QTc 479 ms    Meds:  - dilaudid 1 mg x3  - toradol 30 mg x1  - LR 1 L  - lovenox 70 mg x1      Past Medical History    Past Medical History:   Diagnosis Date    Anxiety     Bleeding disorder (H)     Blood clotting disorder (H)     Cerebral infarction (H) 2015    Cognitive developmental delay     low IQ. 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 side (H)     right hand contractures    Iron overload due to repeated red blood cell transfusions     Migraines     Multiple subsegmental pulmonary emboli without acute cor pulmonale (H) 02/01/2021    Oppositional defiant behavior     Presence of intrauterine contraceptive device 2/18/2020    Superficial venous thrombosis of arm, right 03/25/2021    Uncomplicated asthma        Past Surgical History   Past Surgical History:   Procedure Laterality Date    AS INSERT TUNNELED CV 2 CATH W/O PORT/PUMP      CHOLECYSTECTOMY      CV RIGHT HEART CATH MEASUREMENTS RECORDED N/A 11/18/2021    Procedure: Right Heart Cath;  Surgeon: Jackson Stauffer MD;  Location:  HEART CARDIAC CATH LAB    INSERT PORT VASCULAR ACCESS Left 4/21/2021    Procedure: INSERTION, VASCULAR ACCESS PORT (NOT SURE ON SIDE UNTIL REMOVAL);  Surgeon: Rajan More MD;  Location: [facility] OR    IR CHEST PORT PLACEMENT > 5 YRS OF AGE  4/21/2021    IR CVC NON TUNNEL LINE REMOVAL  5/6/2021    IR CVC NON TUNNEL PLACEMENT > 5 YRS  04/07/2020    IR CVC NON TUNNEL PLACEMENT > 5 YRS  4/30/2021    IR CVC NON TUNNEL PLACEMENT > 5 YRS  9/7/2022    IR PORT REMOVAL LEFT  4/21/2021    REMOVE PORT VASCULAR ACCESS Left 4/21/2021    Procedure: REMOVAL, VASCULAR ACCESS PORT LEFT;   Surgeon: Rajan More MD;  Location: [facility] OR    REPAIR TENDON ELBOW Right 10/02/2019    Procedure: Right Elbow Flexor Lengthening, Flexor Pronator Slide Of Wrist and Finger, Thumb Adductor Lengthening;  Surgeon: Anai Franco MD;  Location: [facility] OR    TONSILLECTOMY Bilateral 10/02/2019    Procedure: Bilateral Tonsillectomy;  Surgeon: Farhana Guy MD;  Location: [facility] OR    ZZC BREAST REDUCTION (INCLUDES LIPO) TIER 3 Bilateral 04/23/2019       Prior to Admission Medications   Prior to Admission Medications   Prescriptions Last Dose Informant Patient Reported? Taking?   Deferiprone, Twice Daily, 1000 MG TABS 11/27/2024  No Yes   Sig: Take 2,000 mg by mouth 2 times daily.   EPINEPHrine (ANY BX GENERIC EQUIV) 0.3 MG/0.3ML injection 2-pack   No No   Sig: Inject 0.3 mLs (0.3 mg) into the muscle as needed for anaphylaxis.   albuterol (PROVENTIL) (2.5 MG/3ML) 0.083% neb solution 11/26/2024  No Yes   Sig: Take 2 vials (5 mg) by nebulization every 6 hours as needed for shortness of breath or wheezing.   aspirin (ASA) 81 MG chewable tablet 11/27/2024  No Yes   Sig: Take 1 tablet (81 mg) by mouth 2 times daily   budesonide-formoterol (SYMBICORT) 160-4.5 MCG/ACT Inhaler 11/27/2024  No Yes   Sig: Inhale 2 puffs twice daily plus 1-2 puffs as needed. May use up to 12 puffs per day.   deferasirox (JADENU) 360 MG tablet 11/27/2024  No Yes   Sig: Take 4 tablets (1,440 mg) by mouth daily.   diphenhydrAMINE (BENADRYL) 50 MG capsule   No No   Sig: Administer 12  hours pre - IV contrast injection and 2 hours prior to contrast. Patient must have a .   gabapentin (NEURONTIN) 300 MG capsule 11/26/2024 Evening  No Yes   Sig: Take 1 capsule (300 mg) by mouth 3 times daily. Take PO 1 pill in evening (300 mg) TID to start. If tolerated, increase by 300 mg in morning or lunch every few days, updating your doctor's office in time to get refills. The maximum dose is 900 mg TID.   Patient taking differently: Take 600 mg by  mouth at bedtime.   hydroxyurea (HYDREA) 500 MG capsule 11/27/2024  No Yes   Sig: Take 6 capsules (3,000 mg) by mouth daily   ibuprofen (ADVIL/MOTRIN) 800 MG tablet 11/26/2024  Yes Yes   Sig: Take 800 mg by mouth every 8 hours as needed for moderate pain   methocarbamol (ROBAXIN) 750 MG tablet 11/27/2024  No Yes   Sig: Take 1 tablet (750 mg) by mouth 4 times daily as needed for muscle spasms (during sickle pain crises. Okay to take scheduled while in pain).   methylPREDNISolone (MEDROL) 32 MG tablet   No No   Sig: Take 1 tab 12 hours before appointment and 1 tab 2 hours prior to the procedure with IV contrast.   naloxone (NARCAN) 4 MG/0.1ML nasal spray   Yes No   Sig: Spray 4 mg into one nostril alternating nostrils as needed for opioid reversal. every 2-3 minutes until assistance arrives   oxyCODONE IR (ROXICODONE) 10 MG tablet 11/27/2024 Morning  No Yes   Sig: Take 1 tablet (10 mg) by mouth every 6 hours as needed for severe pain or breakthrough pain (must last 2 weeks). Goal 4 per day. Max 6 per day.      Facility-Administered Medications: None        Review of Systems    The 10 point Review of Systems is negative other than noted in the HPI or here.     Social History   I have reviewed this patient's social history and updated it with pertinent information if needed.  Social History     Tobacco Use    Smoking status: Never     Passive exposure: Never    Smokeless tobacco: Never   Substance Use Topics    Alcohol use: Not Currently     Alcohol/week: 0.0 standard drinks of alcohol    Drug use: Never         Family History   I have reviewed this patient's family history and updated it with pertinent information if needed.  Family History   Problem Relation Age of Onset    Sickle Cell Trait Mother     Hypertension Mother     Asthma Mother     Sickle Cell Trait Father     Glaucoma No family hx of     Macular Degeneration No family hx of     Diabetes No family hx of     Gout No family hx of          Allergies    Allergies   Allergen Reactions    Contrast Dye Angioedema     Hives and breathing issues    Fish-Derived Products Hives    Seafood Hives    Adhesive Tape Hives     Primipore dressing causes hives    Gadolinium     Iodinated Contrast Media         Physical Exam   Vital Signs: Temp: 98.5  F (36.9  C)   BP: 129/88 Pulse: 98   Resp: 18 SpO2: 96 % O2 Device: Nasal cannula (5L) Oxygen Delivery: 5 LPM  Weight: 156 lbs 0 oz    General Appearance: uncomfortable appearing, laying on her side in bed  Respiratory: expiratory wheezes, no increased WOB, on NC  Cardiovascular: RRR, no m/r/g  MSK: diffuse back pain, no significant worsening of pain to palpation, worse with activity  Skin: no lesions, no rashes noted    Medical Decision Making       75 MINUTES SPENT BY ME on the date of service doing chart review, history, exam, documentation & further activities per the note.      Data   ------------------------- PAST 24 HR DATA REVIEWED -----------------------------------------------    I have personally reviewed the following data over the past 24 hrs:    12.8 (H)  \   7.2 (L)   / 442     135 102 10.1 /  84   3.8 21 (L) 0.47 (L); 0.47 (L) \     Ferritin:  N/A % Retic:  19.7 (H) LDH:  N/A       Imaging results reviewed over the past 24 hrs:   Recent Results (from the past 24 hours)   CT Chest w/o Contrast    Narrative    CT CHEST W/O CONTRAST 11/27/2024 2:42 PM    CLINICAL HISTORY: Sickle cell pain crisis, cough, R/o consolidation or  acute chest    TECHNIQUE: CT chest without IV contrast. Multiplanar reformats were  obtained. Dose reduction techniques were used.  CONTRAST: None.    COMPARISON: Chest radiograph 11/13/2024    FINDINGS:   LUNGS AND PLEURA: Lungs are clear. No pleural effusion or  pneumothorax.    MEDIASTINUM/AXILLAE: Small volume residual thymus in the anterior  mediastinum. No definite lymphadenopathy by size criteria on this  noncontrast exam. Left chest port with tip near the cavoatrial  junction. No thoracic  aortic aneurysm.    CORONARY ARTERY CALCIFICATION: None.    UPPER ABDOMEN: Dense hepatic parenchyma, can be seen with iron  deposition. Atrophic spleen.    MUSCULOSKELETAL: No acute bony abnormality. Likely chronic bony  changes of sickle cell disease.      Impression    IMPRESSION:   1.  No visualized explanation for patient's symptoms.    CHIP PABLO MD         SYSTEM ID:  [id number]

## 2024-11-27 NOTE — TELEPHONE ENCOUNTER
Oncology Nurse Triage - Sickle Cell Pain Crisis:     Situation: Jennifer  calling about Sickle Cell Pain Crisis, requesting to be added on for IV fluids and pain medicine     Background:   Patient's last infusion was 11/25/24 in infusion and ED.   Pt went to ED on 11/22/24, but symptoms had improved, so she did not receive a work up or IVF/pain meds.   Last clinic visit date: 10/31/24; sees Patricia Mantilla today  Does patient have active treatment plan? Yes     Assessment of Symptoms:  Onset/Duration of symptoms: 1 day     Is it typical sickle cell pain? Yes  Location: left arm and left leg  Character: Sharp and achy  Intensity: 7/10     Any radiation of pain, numbness, tingling, weakness, warmth, swelling, discoloration of arms or legs?No     Fever? No     Chest Pain Present: No     Shortness of breath: No     Other home therapies tried: HEAT/HEATING PAD and WARM BATH      Last home medication taken and when: yesterday around 9-10pm Oxycodone and IBU     Any Refills Needed?: No     Does patient have transportation & length of time to get to clinic: Yes- 15 minutes     Recommendations:   Meets protocol

## 2024-11-27 NOTE — ED TRIAGE NOTES
Pt states that she attempted to go to the perfusion clinic but could no get in     Triage Assessment (Adult)       Row Name 11/27/24 1219          Triage Assessment    Airway WDL WDL        Respiratory WDL    Respiratory WDL WDL        Skin Circulation/Temperature WDL    Skin Circulation/Temperature WDL WDL        Cardiac WDL    Cardiac WDL WDL        Peripheral/Neurovascular WDL    Peripheral Neurovascular WDL X  pt has limited R arm movement d/t old CVA        Cognitive/Neuro/Behavioral WDL    Cognitive/Neuro/Behavioral WDL WDL

## 2024-11-27 NOTE — TELEPHONE ENCOUNTER
Pt is calling to ask if there are any openings in infusion.  Informed pt that currently there are no openings.   Pt voiced understanding.

## 2024-11-27 NOTE — MEDICATION SCRIBE - ADMISSION MEDICATION HISTORY
Medication Scribe Admission Medication History    Admission medication history is complete. The information provided in this note is only as accurate as the sources available at the time of the update.    Information Source(s): Patient and CareEverywhere/SureScripts via in-person    Pertinent Information: N/A    Changes made to PTA medication list:  Added: None  Deleted: None  Changed: None    Allergies reviewed with patient and updates made in EHR: yes    Medication History Completed By: Brenda Trejo 11/27/2024 5:07 PM    PTA Med List   Medication Sig Last Dose/Taking    albuterol (PROVENTIL) (2.5 MG/3ML) 0.083% neb solution Take 2 vials (5 mg) by nebulization every 6 hours as needed for shortness of breath or wheezing. 11/26/2024    aspirin (ASA) 81 MG chewable tablet Take 1 tablet (81 mg) by mouth 2 times daily 11/27/2024    budesonide-formoterol (SYMBICORT) 160-4.5 MCG/ACT Inhaler Inhale 2 puffs twice daily plus 1-2 puffs as needed. May use up to 12 puffs per day. 11/27/2024    deferasirox (JADENU) 360 MG tablet Take 4 tablets (1,440 mg) by mouth daily. 11/27/2024    Deferiprone, Twice Daily, 1000 MG TABS Take 2,000 mg by mouth 2 times daily. 11/27/2024    gabapentin (NEURONTIN) 300 MG capsule Take 1 capsule (300 mg) by mouth 3 times daily. Take PO 1 pill in evening (300 mg) TID to start. If tolerated, increase by 300 mg in morning or lunch every few days, updating your doctor's office in time to get refills. The maximum dose is 900 mg TID. (Patient taking differently: Take 600 mg by mouth at bedtime.) 11/26/2024 Evening    hydroxyurea (HYDREA) 500 MG capsule Take 6 capsules (3,000 mg) by mouth daily 11/27/2024    ibuprofen (ADVIL/MOTRIN) 800 MG tablet Take 800 mg by mouth every 8 hours as needed for moderate pain 11/26/2024    methocarbamol (ROBAXIN) 750 MG tablet Take 1 tablet (750 mg) by mouth 4 times daily as needed for muscle spasms (during sickle pain crises. Okay to take scheduled while in pain).  11/27/2024    oxyCODONE IR (ROXICODONE) 10 MG tablet Take 1 tablet (10 mg) by mouth every 6 hours as needed for severe pain or breakthrough pain (must last 2 weeks). Goal 4 per day. Max 6 per day. 11/27/2024 Morning

## 2024-11-28 VITALS
DIASTOLIC BLOOD PRESSURE: 75 MMHG | SYSTOLIC BLOOD PRESSURE: 119 MMHG | HEIGHT: 64 IN | OXYGEN SATURATION: 97 % | TEMPERATURE: 98.7 F | WEIGHT: 156 LBS | HEART RATE: 87 BPM | RESPIRATION RATE: 18 BRPM | BODY MASS INDEX: 26.63 KG/M2

## 2024-11-28 LAB
ALBUMIN SERPL BCG-MCNC: 4.4 G/DL (ref 3.5–5.2)
ALP SERPL-CCNC: 65 U/L (ref 40–150)
ALT SERPL W P-5'-P-CCNC: 40 U/L (ref 0–50)
ANION GAP SERPL CALCULATED.3IONS-SCNC: 10 MMOL/L (ref 7–15)
AST SERPL W P-5'-P-CCNC: 56 U/L (ref 0–45)
ATRIAL RATE - MUSE: 88 BPM
BILIRUB SERPL-MCNC: 2.4 MG/DL
BUN SERPL-MCNC: 9.1 MG/DL (ref 6–20)
CALCIUM SERPL-MCNC: 8.6 MG/DL (ref 8.8–10.4)
CHLORIDE SERPL-SCNC: 104 MMOL/L (ref 98–107)
CREAT SERPL-MCNC: 0.52 MG/DL (ref 0.51–0.95)
DIASTOLIC BLOOD PRESSURE - MUSE: NORMAL MMHG
EGFRCR SERPLBLD CKD-EPI 2021: >90 ML/MIN/1.73M2
ERYTHROCYTE [DISTWIDTH] IN BLOOD BY AUTOMATED COUNT: 22.1 % (ref 10–15)
GLUCOSE SERPL-MCNC: 86 MG/DL (ref 70–99)
HCO3 SERPL-SCNC: 24 MMOL/L (ref 22–29)
HCT VFR BLD AUTO: 19.7 % (ref 35–47)
HGB BLD-MCNC: 6.9 G/DL (ref 11.7–15.7)
HOLD SPECIMEN: NORMAL
INTERPRETATION ECG - MUSE: NORMAL
MCH RBC QN AUTO: 28.9 PG (ref 26.5–33)
MCHC RBC AUTO-ENTMCNC: 35 G/DL (ref 31.5–36.5)
MCV RBC AUTO: 82 FL (ref 78–100)
P AXIS - MUSE: 73 DEGREES
PLATELET # BLD AUTO: 426 10E3/UL (ref 150–450)
POTASSIUM SERPL-SCNC: 3.9 MMOL/L (ref 3.4–5.3)
PR INTERVAL - MUSE: 170 MS
PROT SERPL-MCNC: 7.5 G/DL (ref 6.4–8.3)
QRS DURATION - MUSE: 74 MS
QT - MUSE: 396 MS
QTC - MUSE: 479 MS
R AXIS - MUSE: 44 DEGREES
RBC # BLD AUTO: 2.39 10E6/UL (ref 3.8–5.2)
SODIUM SERPL-SCNC: 138 MMOL/L (ref 135–145)
SYSTOLIC BLOOD PRESSURE - MUSE: NORMAL MMHG
T AXIS - MUSE: 15 DEGREES
VENTRICULAR RATE- MUSE: 88 BPM
WBC # BLD AUTO: 11.1 10E3/UL (ref 4–11)

## 2024-11-28 PROCEDURE — 250N000011 HC RX IP 250 OP 636: Performed by: NURSE PRACTITIONER

## 2024-11-28 PROCEDURE — 250N000011 HC RX IP 250 OP 636: Performed by: STUDENT IN AN ORGANIZED HEALTH CARE EDUCATION/TRAINING PROGRAM

## 2024-11-28 PROCEDURE — 36592 COLLECT BLOOD FROM PICC: CPT | Performed by: STUDENT IN AN ORGANIZED HEALTH CARE EDUCATION/TRAINING PROGRAM

## 2024-11-28 PROCEDURE — 120N000002 HC R&B MED SURG/OB UMMC

## 2024-11-28 PROCEDURE — 99222 1ST HOSP IP/OBS MODERATE 55: CPT | Performed by: STUDENT IN AN ORGANIZED HEALTH CARE EDUCATION/TRAINING PROGRAM

## 2024-11-28 PROCEDURE — 36415 COLL VENOUS BLD VENIPUNCTURE: CPT | Performed by: PEDIATRICS

## 2024-11-28 PROCEDURE — 85018 HEMOGLOBIN: CPT | Performed by: STUDENT IN AN ORGANIZED HEALTH CARE EDUCATION/TRAINING PROGRAM

## 2024-11-28 PROCEDURE — 250N000011 HC RX IP 250 OP 636: Performed by: PEDIATRICS

## 2024-11-28 PROCEDURE — 250N000013 HC RX MED GY IP 250 OP 250 PS 637: Performed by: STUDENT IN AN ORGANIZED HEALTH CARE EDUCATION/TRAINING PROGRAM

## 2024-11-28 PROCEDURE — 99233 SBSQ HOSP IP/OBS HIGH 50: CPT | Performed by: PEDIATRICS

## 2024-11-28 PROCEDURE — 36415 COLL VENOUS BLD VENIPUNCTURE: CPT | Performed by: STUDENT IN AN ORGANIZED HEALTH CARE EDUCATION/TRAINING PROGRAM

## 2024-11-28 PROCEDURE — 80053 COMPREHEN METABOLIC PANEL: CPT | Performed by: STUDENT IN AN ORGANIZED HEALTH CARE EDUCATION/TRAINING PROGRAM

## 2024-11-28 RX ORDER — ONDANSETRON 2 MG/ML
4 INJECTION INTRAMUSCULAR; INTRAVENOUS EVERY 6 HOURS PRN
Status: DISCONTINUED | OUTPATIENT
Start: 2024-11-28 | End: 2024-12-04 | Stop reason: HOSPADM

## 2024-11-28 RX ADMIN — HYDROMORPHONE HYDROCHLORIDE 1 MG: 1 INJECTION, SOLUTION INTRAMUSCULAR; INTRAVENOUS; SUBCUTANEOUS at 01:11

## 2024-11-28 RX ADMIN — ENOXAPARIN SODIUM 70 MG: 80 INJECTION SUBCUTANEOUS at 04:47

## 2024-11-28 RX ADMIN — HYDROMORPHONE HYDROCHLORIDE 1 MG: 1 INJECTION, SOLUTION INTRAMUSCULAR; INTRAVENOUS; SUBCUTANEOUS at 21:52

## 2024-11-28 RX ADMIN — HYDROMORPHONE HYDROCHLORIDE 1 MG: 1 INJECTION, SOLUTION INTRAMUSCULAR; INTRAVENOUS; SUBCUTANEOUS at 04:55

## 2024-11-28 RX ADMIN — HYDROMORPHONE HYDROCHLORIDE 1 MG: 1 INJECTION, SOLUTION INTRAMUSCULAR; INTRAVENOUS; SUBCUTANEOUS at 08:35

## 2024-11-28 RX ADMIN — HYDROMORPHONE HYDROCHLORIDE 1 MG: 1 INJECTION, SOLUTION INTRAMUSCULAR; INTRAVENOUS; SUBCUTANEOUS at 12:38

## 2024-11-28 RX ADMIN — HYDROMORPHONE HYDROCHLORIDE 1 MG: 1 INJECTION, SOLUTION INTRAMUSCULAR; INTRAVENOUS; SUBCUTANEOUS at 16:48

## 2024-11-28 RX ADMIN — ASPIRIN 81 MG CHEWABLE TABLET 81 MG: 81 TABLET CHEWABLE at 19:47

## 2024-11-28 RX ADMIN — ASPIRIN 81 MG CHEWABLE TABLET 81 MG: 81 TABLET CHEWABLE at 08:35

## 2024-11-28 RX ADMIN — HYDROMORPHONE HYDROCHLORIDE 1 MG: 1 INJECTION, SOLUTION INTRAMUSCULAR; INTRAVENOUS; SUBCUTANEOUS at 19:48

## 2024-11-28 RX ADMIN — GABAPENTIN 600 MG: 300 CAPSULE ORAL at 21:52

## 2024-11-28 RX ADMIN — ONDANSETRON 4 MG: 2 INJECTION INTRAMUSCULAR; INTRAVENOUS at 22:30

## 2024-11-28 RX ADMIN — HYDROMORPHONE HYDROCHLORIDE 1 MG: 1 INJECTION, SOLUTION INTRAMUSCULAR; INTRAVENOUS; SUBCUTANEOUS at 14:45

## 2024-11-28 RX ADMIN — HYDROXYUREA 3000 MG: 500 CAPSULE ORAL at 08:33

## 2024-11-28 RX ADMIN — ENOXAPARIN SODIUM 70 MG: 80 INJECTION SUBCUTANEOUS at 15:55

## 2024-11-28 RX ADMIN — HYDROMORPHONE HYDROCHLORIDE 1 MG: 1 INJECTION, SOLUTION INTRAMUSCULAR; INTRAVENOUS; SUBCUTANEOUS at 10:42

## 2024-11-28 ASSESSMENT — ACTIVITIES OF DAILY LIVING (ADL)
ADLS_ACUITY_SCORE: 35
DEPENDENT_IADLS:: TRANSPORTATION
ADLS_ACUITY_SCORE: 35

## 2024-11-28 NOTE — PLAN OF CARE
Goal Outcome Evaluation:      Plan of Care Reviewed With: patient    Overall Patient Progress: no changeOverall Patient Progress: no change         Shift [id number]:  VS: Temp: 98.2  F (36.8  C) Temp src: Oral BP: 116/74 Pulse: 93   Resp: 16 SpO2: 95 % O2 Device: Nasal cannula Oxygen Delivery: 2 LPM    O2: Stable On 2L O2 via NC, shortness of breath with exertion.  Pt denies chest pain or dizziness.     Output: Continent of both bowel and bladder voids adequately.    Last BM: 11/27/2024.    Activity: Independent in room.   Skin: Right arm weakness, Pt declined the skin assessment.   Pain: Back pain 8/10 managed with PRN dilaudid q3h    Neuro: Pt is A&Ox4. Denies numbness and tingling in all extremities.   Dressing: None   Diet: Regular diet. Denies N/V.   LDA: Port single lumen on Left chest, Saline locked.    Equipment: Call light within reach, Personal belongings.    Plan: Continue plan of care and pain management.    Additional Info:

## 2024-11-28 NOTE — CONSULTS
Hematology consult note    24-year-old woman with sickle cell anemia (Hb SS), history of stroke and cognitive delay, right upper extremity hemiparesis, iron overload due to transfusions, multiple thromboembolic events despite anticoagulation use, on hydroxyurea and chelation therapy, admitted with acute pain and dyspnea on exertion.    She reports acute onset of diffuse back pain consistent with prior sickle cell pain episodes.  She called the infusion center but they had no openings so she presented to the ER.  She reported that she had a productive cough with rhinorrhea associated with chest discomfort and dyspnea on exertion.      She was found to have hypoxemia to the 80s on arrival, placed on supplemental oxygen (5 L) with improvement.  She declined CT scan due to prior episodes of angioedema, despite offering of premedication.  She is on therapeutic enoxaparin while waiting VQ scan.  Ultrasound showed no lower extremity DVTs.  Viral swabs negative.  CT chest without contrast unremarkable.    She is working on weaning off of regular opioid use in the outpatient setting.  Per chart notes she has had an inability to remain on therapeutic anticoagulation as an outpatient.    On interview and exam, she appears comfortable on supplemental oxygen, asking to continue sleeping.    On review of labs, her hemoglobin today 6.9 g/dL, which is at her baseline.    Assessment: vaso-occlusive crisis with hypoxemia in a patient with sickle cell anemia.      Her pain crisis may have been precipitated by a recent attempt to down titrate her outpatient opioid use.  Her hypoxemia raises concern for acute infection versus pulmonary embolism versus asthma exacerbation.  Imaging unremarkable for infection thus far, on empiric anticoagulation and awaiting VQ scan.    Recommendations:  -Hold aspirin while on enoxaparin  -Continue enoxaparin, await VQ scan  -Continue Dilaudid PCA  -Continue standing Toradol for 4 days  -Continue  maintenance fluids for 2 days  -Continue hydroxyurea  -Will coordinate outpatient opioid prescription for discharge with her outpatient team    Jacob Cogan, MD  Hematology    60 minutes spent by me on the date of the encounter doing chart review, history and exam, documentation and further activities per the note    This note was completed in part using dictation via the Dragon voice recognition software. Some word and grammatical errors may occur and must be interpreted in the appropriate clinical context. If there are any questions pertaining to this issue, please contact me for further clarification.

## 2024-11-28 NOTE — PROGRESS NOTES
8M Admission Note    Reason for admission: Dyspnea on exertion and sickle cell pain crisis.  Primary team notified of pt arrival. Yes.  Admitted from: [facility] ED  Via: Transport cart  Accompanied by: Staff  Belongings: Placed in closet; valuables sent home with family  Admission Required Doc Completed: Yes  Teaching: Orientation to unit and call light- call light within reach, use of console, meal times, when to call for the RN, and enforced importance of safety.  IV Access: port single lumen on left chest  Telemetry: No  Ht./Wt.: Completed  Code Status verified on armband: Yes  2 RN Skin Assessment Completed with: Pt refused skin assessment, visible skin intact.   Suction/Ambu bag/Flowmeter at bedside: Yes    Pt status:  Temp: 98.2  F (36.8  C) Temp src: Oral  BP: 116/74   Pulse: 93    Resp: 16 SpO2: 95 % O2 Device: Nasal cannula Oxygen Delivery: 2 LPM

## 2024-11-28 NOTE — PLAN OF CARE
"Goal Outcome Evaluation:    VS: /61 (BP Location: Left arm)   Pulse 81   Temp 98.7  F (37.1  C) (Oral)   Resp 18   Ht 1.626 m (5' 4\")   Wt 70.8 kg (156 lb)   LMP  (LMP Unknown)   SpO2 94%   BMI 26.78 kg/m      O2: Pt stable on 2L O2 via face mask. Dyspnea on exertion . Pt has a cough ,nonproductive and infrequent.    Neuro: AXO x4, pt is able to make her needs known    Bowel/Bladder: Continent of B&B, voids without difficulties .   LBM: 11/27/2024   Diet: Regular diet   Skin: Visible skin intact, pt refused skin check .    Pain: Pain managed with PRN  dilaudid q2hr   Activity: Ind in room   Dressings: None   LDA: L chest wall Port a cath single lumen SL   Equipment: Call light and personal belongings    Plan: Pain management , POC ongoing.    Additional Info:            "

## 2024-11-28 NOTE — CONSULTS
Care Management Initial Consult    General Information  Assessment completed with: Patient,    Type of CM/SW Visit: Initial Assessment    Primary Care Provider verified and updated as needed: Yes   Readmission within the last 30 days: lack of support   Return Category: Progression of disease  Reason for Consult: discharge planning  Advance Care Planning: Advance Care Planning Reviewed: verified with patient          Communication Assessment  Patient's communication style: spoken language (English or Bilingual)    Hearing Difficulty or Deaf: no   Wear Glasses or Blind: yes    Cognitive  Cognitive/Neuro/Behavioral: WDL                      Living Environment:   People in home: parent(s), sibling(s)  Rohini  Current living Arrangements: house      Able to return to prior arrangements: yes       Family/Social Support:  Care provided by: self, parent(s)  Provides care for: no one  Marital Status: Single  Support system: Parent(s)          Description of Support System: Supportive, Involved    Support Assessment: Adequate family and caregiver support, Adequate social supports    Current Resources:   Patient receiving home care services: No        Community Resources: None  Equipment currently used at home: none  Supplies currently used at home: None    Employment/Financial:  Employment Status: disabled        Financial Concerns: none   Referral to Financial Worker: No       Does the patient's insurance plan have a 3 day qualifying hospital stay waiver?  No    Lifestyle & Psychosocial Needs:  Social Drivers of Health     Food Insecurity: Low Risk  (11/28/2024)    Food Insecurity     Within the past 12 months, did you worry that your food would run out before you got money to buy more?: No     Within the past 12 months, did the food you bought just not last and you didn t have money to get more?: No   Depression: Not at risk (9/30/2024)    PHQ-2     PHQ-2 Score: 2   Housing Stability: High Risk (11/28/2024)    Housing  Stability     Do you have housing? : No     Are you worried about losing your housing?: No   Tobacco Use: Low Risk  (11/22/2024)    Patient History     Smoking Tobacco Use: Never     Smokeless Tobacco Use: Never     Passive Exposure: Never   Financial Resource Strain: Low Risk  (11/28/2024)    Financial Resource Strain     Within the past 12 months, have you or your family members you live with been unable to get utilities (heat, electricity) when it was really needed?: No   Alcohol Use: Not on file   Transportation Needs: Low Risk  (11/28/2024)    Transportation Needs     Within the past 12 months, has lack of transportation kept you from medical appointments, getting your medicines, non-medical meetings or appointments, work, or from getting things that you need?: No   Physical Activity: Unknown (7/3/2024)    Exercise Vital Sign     Days of Exercise per Week: 1 day     Minutes of Exercise per Session: Not on file   Interpersonal Safety: Low Risk  (11/28/2024)    Interpersonal Safety     Do you feel physically and emotionally safe where you currently live?: Yes     Within the past 12 months, have you been hit, slapped, kicked or otherwise physically hurt by someone?: No     Within the past 12 months, have you been humiliated or emotionally abused in other ways by your partner or ex-partner?: No   Stress: No Stress Concern Present (7/3/2024)    [employer] - Occupational Stress Questionnaire     Feeling of Stress : Only a little   Social Connections: Unknown (7/3/2024)    Social Connection and Isolation Panel [NHANES]     Frequency of Communication with Friends and Family: Not on file     Frequency of Social Gatherings with Friends and Family: Never     Attends [employer] Services: Not on file     Active Member of Clubs or Organizations: Not on file     Attends Club or Organization Meetings: Not on file     Marital Status: Not on file   Health Literacy: Not on file       Functional  Status:  Prior to admission patient needed assistance:   Dependent ADLs:: Independent  Dependent IADLs:: Transportation  Assesssment of Functional Status: Not at baseline with ADL Functioning    Mental Health Status:  Mental Health Status: Current Concern       Chemical Dependency Status:  Chemical Dependency Status: No Current Concerns             Values/Beliefs:  Spiritual, Cultural Beliefs, [employer] Practices, Values that affect care: no               Discussed  Partnership in Safe Discharge Planning  document with patient/family: No    Additional Information:  Writer met with pt at bed side and introduced self and explain social work role. Writer met with pt in order to complete case management initial assessment. Pt is currently residing at a house and lives with family, that includes her mother and younger siblings . Pt was receiving services prior to admission. Pt mother is her PCA.    At baseline, pt IS independent with ADLs. Writer confirmed pt contact information and housing was accurate as of this admission. Pt does not have a HCD. Pt is interested in filling one out. The writer provided the required documents for pt to review and fill out. The writer explained that SW can coordinate notary signing to make documents legal the same day on weekdays.   Writer reviewed insurance and PCP information on file and confirmed this is accurate in the chart.     Pt states her mother will be able to provide transportation when she is medically ready for discharge.       Next Steps: No further care management intervention anticipated at this time.  Please re-consult if further needs arise.  Care management signing off.        ALLI Leone  11/28/2024       Social Work and Care Management Department       SEARCHABLE in [facility] - search SOCIAL WORK       [place] ([phone]) Saturday and Sunday     Units: 4A Vocera, 4C Vocera, & 4E Vocera        Units: 5A [id number] Vocera, 5A 9733-9294 Vocera ,  BMT SW 1 BMT SW 2, BMT SW 3 & BMT SW 4  5C Off Service [phone]  5C Off Service [id number]     Units: 6A Vocera & 6B Vocera      Units: 6C Vocera     Units: 7A Vocera & 7B Vocera      Units: 7C Med Surg 7401 thru 7418 and 7C Med Surg 7502 thru 7521      Unit: [facility] ED Vocera & [facility] Obs Vocera     [place] ([phone]) Saturday and Sunday      Units: 5 Ortho Vocera, 5 Med Surg Vocera & WB ED Vocera     Units: 6 Med Surg Vocera, 8 Med Surg Vocera, & 10 ICU Vocera      After hours Vocera [place] and After Hours Vocera [place]     Please NOTE changes to times below:    **Saturday & Sunday (1630 - 2030)    **Mon-Fri ([id number])     **[facility] Recognized Holidays  ([id number])    Units: ALL   - see above VOCERA links to units

## 2024-11-28 NOTE — PROGRESS NOTES
[facility]    Medicine Progress Note - Hospitalist Service, GOLD TEAM 22    Date of Admission:  11/27/2024    Assessment & Plan      Jennifer Cervantes is a 24 year old F with PMH of PE, not on AC, SCD c/b CVA who p/w sickle cell crisis pain, ORTEGA, and cough.    Still with significant pain.  Requested to attempt Q2 hour intermittent dilaudid instead of PCA for now.  Bedside nurse said that they can't do exactly every 2 hours but will do their best.  With 2L O2 by NC needed.    Today's Plan   - Increase intermittent dilaudid to Q2 from Q3 hrs   - Continue other therapies for sickle cell pain crisis      #Acute hypoxic respiratory failure  #Hx of recurrent PE  #Contrast dye allergy  Hypoxic to 80s on arrival. Prior hx of PE raises concern for recurrence. Unfortunately is allergic to contrast (prior angioedema) and refused CT PA with premedication on d/w ED provider. Non-contrast CT was normal.  She was started on lovenox in the ED.  - continue empiric lovenox 1 mg/kg bid until imaging is completed  - f/u VQ scan  - f/u BLE doppler --> negative for DVT  - supplemental O2 prn  - mgmt of asthma as below  - if PE workup is negative and ongoing respiratory distress, could consider steroid course for acute asthma exacerbation    #Sickle cell pain crisis  Has a L chest port for simple transfusions (last a few weeks ago) and exchanges as needed. Reports she is on oxycodone 10 mg up to 6x per day at home but usually only takes 3-4x daily. Currently she is responsive to dilaudid IV 1 mg. Follows with Dr. Duncan  - hematology consulted  - continue home oxycodone  - continue IV dilaudid prn for breakthrough pain 1 mg q3h  - continue PTA hydroxyurea  - mgmt of hemochromatosis as below    #Rhinorrhea  #Cough  #Asthma  Covid/flu negative. No s/sxs or concerning findings on CT chest (non-con) for bacterial PNA. Will manage conservatively at this time pending PE workup as above  - continue home  "inhalers  - outpatient f/u with pulmonology    #Leukocytosis - suspect reactive, monitor    #Hemochromatosis - continue PTA deferasirox and deferiprone    #Hx of CVA - continue PTA ASA bid          Diet: Regular Diet Adult    DVT Prophylaxis: Enoxaparin (Lovenox) SQ  Lopez Catheter: Not present  Lines: PRESENT             Cardiac Monitoring: None  Code Status: Full Code      Clinically Significant Risk Factors Present on Admission           # Hypocalcemia: Lowest Ca = 8.6 mg/dL in last 2 days, will monitor and replace as appropriate       # Drug Induced Platelet Defect: home medication list includes an antiplatelet medication        # Anemia: based on hgb <11       # Overweight: Estimated body mass index is 26.78 kg/m  as calculated from the following:    Height as of this encounter: 1.626 m (5' 4\").    Weight as of this encounter: 70.8 kg (156 lb).       # Financial/Environmental Concerns:    # Asthma: noted on problem list        Social Drivers of Health    Housing Stability: High Risk (11/28/2024)    Housing Stability     Do you have housing? : No     Are you worried about losing your housing?: No   Physical Activity: Unknown (7/3/2024)    Exercise Vital Sign     Days of Exercise per Week: 1 day   Social Connections: Unknown (7/3/2024)    Social Connection and Isolation Panel [NHANES]     Frequency of Social Gatherings with Friends and Family: Never          Disposition Plan     Medically Ready for Discharge: Anticipated in 2-4 Days         Suraj Small MD  Hospitalist Service, GOLD TEAM 22  [facility]  Securely message with [employer] (more info)  Text page via [facility] Paging/Directory   See signed in provider for up to date coverage information  ______________________________________________________________________    Interval History   Very sleep as was up most of the night in the ED and getting pain under control.  Still with poorly controlled pain and is wanting to " avoid PCA administration for now.  Pain is mostly chest as is typical for her episodes.    Physical Exam   Vital Signs: Temp: 97.7  F (36.5  C) Temp src: Oral BP: 125/70 Pulse: 100   Resp: 18 SpO2: 98 % O2 Device: Oxymask Oxygen Delivery: 2 LPM  Weight: 156 lbs 0 oz    Constitutional: awake, alert, cooperative, moderate distress from pain  Respiratory: No increased work of breathing, good air exchange, clear to auscultation bilaterally, no crackles or wheezing  Cardiovascular: Normal apical impulse, regular rate and rhythm, normal S1 and S2, no S3 or S4, and no murmur noted  GI: No scars, normal bowel sounds, soft, non-distended, non-tender, no masses palpated, no hepatosplenomegally  Neuropsychiatric:     General: restless and poor eye contact    Affect: anxious    Medical Decision Making         Data     I have personally reviewed the following data over the past 24 hrs:    11.1 (H)  \   6.9 (LL)   / 426     138 104 9.1 /  86   3.9 24 0.52 \     ALT: 40 AST: 56 (H) AP: 65 TBILI: 2.4 (H)   ALB: 4.4 TOT PROTEIN: 7.5 LIPASE: N/A     Ferritin:  N/A % Retic:  19.7 (H) LDH:  N/A       Imaging results reviewed over the past 24 hrs:   Recent Results (from the past 24 hours)   CT Chest w/o Contrast    Narrative    CT CHEST W/O CONTRAST 11/27/2024 2:42 PM    CLINICAL HISTORY: Sickle cell pain crisis, cough, R/o consolidation or  acute chest    TECHNIQUE: CT chest without IV contrast. Multiplanar reformats were  obtained. Dose reduction techniques were used.  CONTRAST: None.    COMPARISON: Chest radiograph 11/13/2024    FINDINGS:   LUNGS AND PLEURA: Lungs are clear. No pleural effusion or  pneumothorax.    MEDIASTINUM/AXILLAE: Small volume residual thymus in the anterior  mediastinum. No definite lymphadenopathy by size criteria on this  noncontrast exam. Left chest port with tip near the cavoatrial  junction. No thoracic aortic aneurysm.    CORONARY ARTERY CALCIFICATION: None.    UPPER ABDOMEN: Dense hepatic parenchyma, can  be seen with iron  deposition. Atrophic spleen.    MUSCULOSKELETAL: No acute bony abnormality. Likely chronic bony  changes of sickle cell disease.      Impression    IMPRESSION:   1.  No visualized explanation for patient's symptoms.    CHIP PABLO MD         SYSTEM ID:  [id number]   US Lower Extremity Venous Duplex Bilateral    Narrative    EXAM: US LOWER EXTREMITY VENOUS DUPLEX BILATERAL  LOCATION: [facility]  DATE: 11/27/2024    INDICATION: Clinical concern for pulmonary embolus; evaluate for lower extremity thrombus  COMPARISON: None.  TECHNIQUE: Venous Duplex ultrasound of bilateral lower extremities with and without compression, augmentation and duplex. Color flow and spectral Doppler with waveform analysis performed.    FINDINGS: Exam includes the common femoral, femoral, popliteal veins as well as segmentally visualized deep calf veins and greater saphenous vein.     RIGHT: No deep vein thrombosis. No superficial thrombophlebitis. No popliteal cyst.    LEFT: No deep vein thrombosis. No superficial thrombophlebitis. No popliteal cyst.      Impression    IMPRESSION:  No deep venous thrombosis in the bilateral lower extremities.

## 2024-11-29 ENCOUNTER — APPOINTMENT (OUTPATIENT)
Dept: NUCLEAR MEDICINE | Facility: CLINIC | Age: 24
End: 2024-11-29
Attending: STUDENT IN AN ORGANIZED HEALTH CARE EDUCATION/TRAINING PROGRAM
Payer: COMMERCIAL

## 2024-11-29 PROCEDURE — 343N000001 HC RX 343 MED OP 636: Performed by: STUDENT IN AN ORGANIZED HEALTH CARE EDUCATION/TRAINING PROGRAM

## 2024-11-29 PROCEDURE — A9567 TECHNETIUM TC-99M AEROSOL: HCPCS | Performed by: STUDENT IN AN ORGANIZED HEALTH CARE EDUCATION/TRAINING PROGRAM

## 2024-11-29 PROCEDURE — 78582 LUNG VENTILAT&PERFUS IMAGING: CPT | Mod: 26 | Performed by: RADIOLOGY

## 2024-11-29 PROCEDURE — 120N000002 HC R&B MED SURG/OB UMMC

## 2024-11-29 PROCEDURE — 250N000011 HC RX IP 250 OP 636: Performed by: PEDIATRICS

## 2024-11-29 PROCEDURE — A9540 TC99M MAA: HCPCS | Performed by: STUDENT IN AN ORGANIZED HEALTH CARE EDUCATION/TRAINING PROGRAM

## 2024-11-29 PROCEDURE — 78582 LUNG VENTILAT&PERFUS IMAGING: CPT

## 2024-11-29 PROCEDURE — 272N000035 NM LUNG SCAN VENTILATION AND PERFUSION

## 2024-11-29 PROCEDURE — 250N000011 HC RX IP 250 OP 636: Performed by: STUDENT IN AN ORGANIZED HEALTH CARE EDUCATION/TRAINING PROGRAM

## 2024-11-29 PROCEDURE — 99233 SBSQ HOSP IP/OBS HIGH 50: CPT | Performed by: PEDIATRICS

## 2024-11-29 PROCEDURE — 250N000013 HC RX MED GY IP 250 OP 250 PS 637: Performed by: STUDENT IN AN ORGANIZED HEALTH CARE EDUCATION/TRAINING PROGRAM

## 2024-11-29 RX ADMIN — HYDROXYUREA 3000 MG: 500 CAPSULE ORAL at 09:36

## 2024-11-29 RX ADMIN — HYDROMORPHONE HYDROCHLORIDE 1 MG: 1 INJECTION, SOLUTION INTRAMUSCULAR; INTRAVENOUS; SUBCUTANEOUS at 17:29

## 2024-11-29 RX ADMIN — ENOXAPARIN SODIUM 70 MG: 80 INJECTION SUBCUTANEOUS at 15:15

## 2024-11-29 RX ADMIN — HYDROMORPHONE HYDROCHLORIDE 1 MG: 1 INJECTION, SOLUTION INTRAMUSCULAR; INTRAVENOUS; SUBCUTANEOUS at 21:46

## 2024-11-29 RX ADMIN — HYDROMORPHONE HYDROCHLORIDE 1 MG: 1 INJECTION, SOLUTION INTRAMUSCULAR; INTRAVENOUS; SUBCUTANEOUS at 09:39

## 2024-11-29 RX ADMIN — HYDROMORPHONE HYDROCHLORIDE 1 MG: 1 INJECTION, SOLUTION INTRAMUSCULAR; INTRAVENOUS; SUBCUTANEOUS at 07:06

## 2024-11-29 RX ADMIN — HYDROMORPHONE HYDROCHLORIDE 1 MG: 1 INJECTION, SOLUTION INTRAMUSCULAR; INTRAVENOUS; SUBCUTANEOUS at 00:19

## 2024-11-29 RX ADMIN — HYDROMORPHONE HYDROCHLORIDE 1 MG: 1 INJECTION, SOLUTION INTRAMUSCULAR; INTRAVENOUS; SUBCUTANEOUS at 04:39

## 2024-11-29 RX ADMIN — HYDROMORPHONE HYDROCHLORIDE 1 MG: 1 INJECTION, SOLUTION INTRAMUSCULAR; INTRAVENOUS; SUBCUTANEOUS at 19:49

## 2024-11-29 RX ADMIN — HYDROMORPHONE HYDROCHLORIDE 1 MG: 1 INJECTION, SOLUTION INTRAMUSCULAR; INTRAVENOUS; SUBCUTANEOUS at 02:36

## 2024-11-29 RX ADMIN — ASPIRIN 81 MG CHEWABLE TABLET 81 MG: 81 TABLET CHEWABLE at 09:36

## 2024-11-29 RX ADMIN — HYDROMORPHONE HYDROCHLORIDE 1 MG: 1 INJECTION, SOLUTION INTRAMUSCULAR; INTRAVENOUS; SUBCUTANEOUS at 15:15

## 2024-11-29 RX ADMIN — HYDROMORPHONE HYDROCHLORIDE 1 MG: 1 INJECTION, SOLUTION INTRAMUSCULAR; INTRAVENOUS; SUBCUTANEOUS at 12:25

## 2024-11-29 RX ADMIN — KIT FOR THE PREPARATION OF TECHNETIUM TC 99M PENTETATE 2 MILLICURIE: 20 INJECTION, POWDER, LYOPHILIZED, FOR SOLUTION INTRAVENOUS; RESPIRATORY (INHALATION) at 14:15

## 2024-11-29 RX ADMIN — KIT FOR THE PREPARATION OF TECHNETIUM TC 99M ALBUMIN AGGREGATED 6.5 MILLICURIE: 2.5 INJECTION, POWDER, FOR SOLUTION INTRAVENOUS at 14:28

## 2024-11-29 RX ADMIN — GABAPENTIN 600 MG: 300 CAPSULE ORAL at 21:41

## 2024-11-29 RX ADMIN — ENOXAPARIN SODIUM 70 MG: 80 INJECTION SUBCUTANEOUS at 03:37

## 2024-11-29 ASSESSMENT — ACTIVITIES OF DAILY LIVING (ADL)
ADLS_ACUITY_SCORE: 35
ADLS_ACUITY_SCORE: 37
ADLS_ACUITY_SCORE: 35
ADLS_ACUITY_SCORE: 37
ADLS_ACUITY_SCORE: 37
ADLS_ACUITY_SCORE: 35
ADLS_ACUITY_SCORE: 35
ADLS_ACUITY_SCORE: 37
ADLS_ACUITY_SCORE: 35

## 2024-11-29 NOTE — PROGRESS NOTES
[facility]    Medicine Progress Note - Hospitalist Service, GOLD TEAM 22    Date of Admission:  11/27/2024    Assessment & Plan      Jennifer Cervantes is a 24 year old F with PMH of PE, not on AC, SCD c/b CVA who p/w sickle cell crisis pain, ORTEGA, and cough.    Still with significant pain.  Requested to attempt Q2 hour intermittent dilaudid instead of PCA for now.  She is aware that nursing can't, due to staffing load, consistently administer every 2 hours but that they will do their best.  With 2L O2 by NC needed.  Hgb near baseline.  No indication for transfusion.    Today's Plan   - VQ Scan to be performed today   - Continue intermittent dilaudid Q2, as opposed to PCA, per patient request   - Continue other therapies for sickle cell pain crisis as outlined in Heme note   - Contact heme prior to any PRBC transfusion    #Acute hypoxic respiratory failure  #Hx of recurrent PE  #Contrast dye allergy  Hypoxic to 80s on arrival. Prior hx of PE raises concern for recurrence. Unfortunately is allergic to contrast (prior angioedema) and refused CT PA with premedication on d/w ED provider. Non-contrast CT was normal.  She was started on lovenox in the ED.  - f/u BLE doppler --> negative for DVT  - continue empiric lovenox 1 mg/kg bid until imaging is completed  - f/u VQ scan today  - supplemental O2 prn  - mgmt of asthma as below  - if PE workup is negative and ongoing respiratory distress, could consider steroid course for acute asthma exacerbation    #Sickle cell pain crisis  Has a L chest port for simple transfusions (last a few weeks ago) and exchanges as needed. Reports she is on oxycodone 10 mg up to 6x per day at home but usually only takes 3-4x daily. Currently she is responsive to dilaudid IV 1 mg. Follows with Dr. Duncan  - hematology consulted  - continue home oxycodone  - continue IV dilaudid prn for breakthrough pain 1 mg q3h  - continue PTA hydroxyurea  - mgmt of  "hemochromatosis as below    #Rhinorrhea  #Cough  #Asthma  Covid/flu negative. No s/sxs or concerning findings on CT chest (non-con) for bacterial PNA. Will manage conservatively at this time pending PE workup as above  - continue home inhalers  - outpatient f/u with pulmonology    #Leukocytosis - suspect reactive, monitor    #Hemochromatosis - continue PTA deferasirox and deferiprone    #Hx of CVA - continue PTA ASA bid          Diet: Regular Diet Adult    DVT Prophylaxis: Enoxaparin (Lovenox) SQ  Lopez Catheter: Not present  Lines: PRESENT      Port a Cath 11/25/24 Single Lumen Left Chest wall-Site Assessment: WDL      Cardiac Monitoring: None  Code Status: Full Code      Clinically Significant Risk Factors           # Hypocalcemia: Lowest Ca = 8.6 mg/dL in last 2 days, will monitor and replace as appropriate                      # Overweight: Estimated body mass index is 26.78 kg/m  as calculated from the following:    Height as of this encounter: 1.626 m (5' 4\").    Weight as of this encounter: 70.8 kg (156 lb)., PRESENT ON ADMISSION       # Financial/Environmental Concerns: none  # Asthma: noted on problem list        Social Drivers of Health    Housing Stability: High Risk (11/28/2024)    Housing Stability     Do you have housing? : No     Are you worried about losing your housing?: No   Physical Activity: Unknown (7/3/2024)    Exercise Vital Sign     Days of Exercise per Week: 1 day   Social Connections: Unknown (7/3/2024)    Social Connection and Isolation Panel [NHANES]     Frequency of Social Gatherings with Friends and Family: Never          Disposition Plan     Medically Ready for Discharge: Anticipated in 2-4 Days         Suraj Small MD  Hospitalist Service, GOLD TEAM [address] [place] [address]  Securely message with [employer] (more info)  Text page via [facility] Paging/Directory   See signed in provider for up to date coverage " information  ______________________________________________________________________    Interval History   More awake today and pain under better control.  Still needing the dilaudid every two hours so is tough when nursing can't get there right away.  Still wanting to avoid PCA administration for now.  Pain is mostly chest as is typical for her episodes.    Physical Exam   Vital Signs: Temp: 98  F (36.7  C) Temp src: Skin BP: 106/63 Pulse: 82   Resp: 18 SpO2: 97 % O2 Device: Nasal cannula Oxygen Delivery: 2 LPM  Weight: 156 lbs 0 oz    Constitutional: awake, alert, cooperative, moderate distress from pain  Respiratory: No increased work of breathing, good air exchange, clear to auscultation bilaterally, no crackles or wheezing  Cardiovascular: Regular rate and rhythm, normal S1 and S2, and no murmur noted  GI: Noormal bowel sounds, soft, non-distended, non-tender  Neuropsychiatric:     General: restless and poor eye contact    Affect: anxious (improved from yesterday)    Medical Decision Making         Data         Imaging results reviewed over the past 24 hrs:   No results found for this or any previous visit (from the past 24 hours).

## 2024-11-29 NOTE — PLAN OF CARE
Goal Outcome Evaluation:      Plan of Care Reviewed With: patient    Overall Patient Progress: no changeOverall Patient Progress: no change    Shift [id number]     @ acute event overnight. Pt is AxO x4. VSS, on supplemental 2L NC. Denies SOB, chest pain, no n/v. Dyspnea on exertion. Continent bowel and bladder. Last bm reported 11/27/2024 per pt. Ind in room. Requested PRN pain medication q2 hr. Still refused skin assessment but visible skin intact. POC ongoing.    Temp: 98.7  F (37.1  C) Temp src: Oral BP: 102/61 Pulse: 81   Resp: 18 SpO2: 94 % O2 Device: Oxymask Oxygen Delivery: 2 LPM

## 2024-11-30 LAB
ANION GAP SERPL CALCULATED.3IONS-SCNC: 11 MMOL/L (ref 7–15)
BUN SERPL-MCNC: 4.5 MG/DL (ref 6–20)
CALCIUM SERPL-MCNC: 9.1 MG/DL (ref 8.8–10.4)
CHLORIDE SERPL-SCNC: 101 MMOL/L (ref 98–107)
CREAT SERPL-MCNC: 0.5 MG/DL (ref 0.51–0.95)
EGFRCR SERPLBLD CKD-EPI 2021: >90 ML/MIN/1.73M2
ERYTHROCYTE [DISTWIDTH] IN BLOOD BY AUTOMATED COUNT: 24.9 % (ref 10–15)
GLUCOSE SERPL-MCNC: 86 MG/DL (ref 70–99)
HCO3 SERPL-SCNC: 25 MMOL/L (ref 22–29)
HCT VFR BLD AUTO: 19.4 % (ref 35–47)
HGB BLD-MCNC: 6.8 G/DL (ref 11.7–15.7)
MCH RBC QN AUTO: 29.2 PG (ref 26.5–33)
MCHC RBC AUTO-ENTMCNC: 35.1 G/DL (ref 31.5–36.5)
MCV RBC AUTO: 83 FL (ref 78–100)
PLATELET # BLD AUTO: 442 10E3/UL (ref 150–450)
POTASSIUM SERPL-SCNC: 4.2 MMOL/L (ref 3.4–5.3)
RBC # BLD AUTO: 2.33 10E6/UL (ref 3.8–5.2)
SODIUM SERPL-SCNC: 137 MMOL/L (ref 135–145)
WBC # BLD AUTO: 14.2 10E3/UL (ref 4–11)

## 2024-11-30 PROCEDURE — 99232 SBSQ HOSP IP/OBS MODERATE 35: CPT | Performed by: PEDIATRICS

## 2024-11-30 PROCEDURE — 80048 BASIC METABOLIC PNL TOTAL CA: CPT | Performed by: PEDIATRICS

## 2024-11-30 PROCEDURE — 250N000011 HC RX IP 250 OP 636: Performed by: NURSE PRACTITIONER

## 2024-11-30 PROCEDURE — 250N000013 HC RX MED GY IP 250 OP 250 PS 637: Performed by: PEDIATRICS

## 2024-11-30 PROCEDURE — 120N000002 HC R&B MED SURG/OB UMMC

## 2024-11-30 PROCEDURE — 250N000011 HC RX IP 250 OP 636: Performed by: STUDENT IN AN ORGANIZED HEALTH CARE EDUCATION/TRAINING PROGRAM

## 2024-11-30 PROCEDURE — 250N000011 HC RX IP 250 OP 636: Performed by: PEDIATRICS

## 2024-11-30 PROCEDURE — 250N000013 HC RX MED GY IP 250 OP 250 PS 637: Performed by: STUDENT IN AN ORGANIZED HEALTH CARE EDUCATION/TRAINING PROGRAM

## 2024-11-30 PROCEDURE — 85027 COMPLETE CBC AUTOMATED: CPT | Performed by: PEDIATRICS

## 2024-11-30 PROCEDURE — 36592 COLLECT BLOOD FROM PICC: CPT | Performed by: PEDIATRICS

## 2024-11-30 PROCEDURE — 250N000012 HC RX MED GY IP 250 OP 636 PS 637: Performed by: PEDIATRICS

## 2024-11-30 RX ORDER — PREDNISONE 20 MG/1
20 TABLET ORAL DAILY
Status: DISCONTINUED | OUTPATIENT
Start: 2024-11-30 | End: 2024-12-04 | Stop reason: HOSPADM

## 2024-11-30 RX ORDER — ACETAMINOPHEN 500 MG
500 TABLET ORAL EVERY 4 HOURS PRN
Status: DISCONTINUED | OUTPATIENT
Start: 2024-11-30 | End: 2024-12-04 | Stop reason: HOSPADM

## 2024-11-30 RX ADMIN — HYDROMORPHONE HYDROCHLORIDE 1 MG: 1 INJECTION, SOLUTION INTRAMUSCULAR; INTRAVENOUS; SUBCUTANEOUS at 15:32

## 2024-11-30 RX ADMIN — HYDROMORPHONE HYDROCHLORIDE 1 MG: 1 INJECTION, SOLUTION INTRAMUSCULAR; INTRAVENOUS; SUBCUTANEOUS at 22:08

## 2024-11-30 RX ADMIN — ACETAMINOPHEN 500 MG: 500 TABLET ORAL at 13:21

## 2024-11-30 RX ADMIN — GABAPENTIN 600 MG: 300 CAPSULE ORAL at 22:07

## 2024-11-30 RX ADMIN — ONDANSETRON 4 MG: 2 INJECTION INTRAMUSCULAR; INTRAVENOUS at 08:49

## 2024-11-30 RX ADMIN — ASPIRIN 81 MG CHEWABLE TABLET 81 MG: 81 TABLET CHEWABLE at 20:06

## 2024-11-30 RX ADMIN — ENOXAPARIN SODIUM 70 MG: 80 INJECTION SUBCUTANEOUS at 03:42

## 2024-11-30 RX ADMIN — PREDNISONE 20 MG: 20 TABLET ORAL at 15:32

## 2024-11-30 RX ADMIN — HYDROMORPHONE HYDROCHLORIDE 1 MG: 1 INJECTION, SOLUTION INTRAMUSCULAR; INTRAVENOUS; SUBCUTANEOUS at 11:07

## 2024-11-30 RX ADMIN — HYDROMORPHONE HYDROCHLORIDE 1 MG: 1 INJECTION, SOLUTION INTRAMUSCULAR; INTRAVENOUS; SUBCUTANEOUS at 03:25

## 2024-11-30 RX ADMIN — HYDROMORPHONE HYDROCHLORIDE 1 MG: 1 INJECTION, SOLUTION INTRAMUSCULAR; INTRAVENOUS; SUBCUTANEOUS at 20:07

## 2024-11-30 RX ADMIN — HYDROXYUREA 3000 MG: 500 CAPSULE ORAL at 08:36

## 2024-11-30 RX ADMIN — HYDROMORPHONE HYDROCHLORIDE 1 MG: 1 INJECTION, SOLUTION INTRAMUSCULAR; INTRAVENOUS; SUBCUTANEOUS at 13:21

## 2024-11-30 RX ADMIN — HYDROMORPHONE HYDROCHLORIDE 1 MG: 1 INJECTION, SOLUTION INTRAMUSCULAR; INTRAVENOUS; SUBCUTANEOUS at 00:12

## 2024-11-30 RX ADMIN — HYDROMORPHONE HYDROCHLORIDE 1 MG: 1 INJECTION, SOLUTION INTRAMUSCULAR; INTRAVENOUS; SUBCUTANEOUS at 08:36

## 2024-11-30 RX ADMIN — HYDROMORPHONE HYDROCHLORIDE 1 MG: 1 INJECTION, SOLUTION INTRAMUSCULAR; INTRAVENOUS; SUBCUTANEOUS at 06:20

## 2024-11-30 RX ADMIN — HYDROMORPHONE HYDROCHLORIDE 1 MG: 1 INJECTION, SOLUTION INTRAMUSCULAR; INTRAVENOUS; SUBCUTANEOUS at 17:57

## 2024-11-30 ASSESSMENT — ACTIVITIES OF DAILY LIVING (ADL)
ADLS_ACUITY_SCORE: 37

## 2024-11-30 NOTE — PROGRESS NOTES
[facility]    Medicine Progress Note - Hospitalist Service, GOLD TEAM 22    Date of Admission:  11/27/2024    Assessment & Plan      Jennifer Cervantes is a 24 year old F with PMH of PE, not on AC, SCD c/b CVA who p/w sickle cell crisis pain, ORTEGA, and cough.    Still with significant pain.  Requested to attempt Q2 hour intermittent dilaudid instead of PCA for now.  She is aware that nursing can't, due to staffing load, consistently administer every 2 hours but that they will do their best.  With 2L O2 by NC needed.  Hgb near baseline (6.8 today).  No indication for transfusion.  VQ scan negative for PE.  Concern for acute asthma exacerbation.    Today's Plan   - Discontinue enoxaparin   - Restart ASA and Deferiprone   - Steroid burst 20 mg daily for 5 days for asthma exacerbation   - Continue intermittent dilaudid Q2, as opposed to PCA, per patient request   - Continue other therapies for sickle cell pain crisis as outlined in Heme note   - Contact heme prior to any PRBC transfusion    #Acute hypoxic respiratory failure  #Hx of recurrent PE  #Contrast dye allergy  Hypoxic to 80s on arrival. Prior hx of PE raises concern for recurrence. Unfortunately is allergic to contrast (prior angioedema) and refused CT PA with premedication on d/w ED provider. Non-contrast CT was normal.  She was started on lovenox in the ED.  - f/u BLE doppler --> negative for DVT  - continue empiric lovenox 1 mg/kg bid until imaging is completed  - f/u VQ scan today  - supplemental O2 prn  - mgmt of asthma as below  - if PE workup is negative and ongoing respiratory distress, could consider steroid course for acute asthma exacerbation    #Sickle cell pain crisis  Has a L chest port for simple transfusions (last a few weeks ago) and exchanges as needed. Reports she is on oxycodone 10 mg up to 6x per day at home but usually only takes 3-4x daily. Currently she is responsive to dilaudid IV 1 mg. Follows  "with Dr. Duncan  - hematology consulted  - continue home oxycodone  - continue IV dilaudid prn for breakthrough pain 1 mg q3h  - continue PTA hydroxyurea  - mgmt of hemochromatosis as below    #Rhinorrhea  #Cough  #Asthma  Covid/flu negative. No s/sxs or concerning findings on CT chest (non-con) for bacterial PNA. Will manage conservatively at this time pending PE workup as above  - continue home inhalers  - outpatient f/u with pulmonology    #Leukocytosis - suspect reactive, monitor    #Hemochromatosis - continue PTA deferasirox and deferiprone    #Hx of CVA - continue PTA ASA bid          Diet: Regular Diet Adult    DVT Prophylaxis: Enoxaparin (Lovenox) SQ  Lopez Catheter: Not present  Lines: PRESENT      Port a Cath 11/25/24 Single Lumen Left Chest wall-Site Assessment: WDL      Cardiac Monitoring: None  Code Status: Full Code      Clinically Significant Risk Factors                                # Overweight: Estimated body mass index is 26.78 kg/m  as calculated from the following:    Height as of this encounter: 1.626 m (5' 4\").    Weight as of this encounter: 70.8 kg (156 lb)., PRESENT ON ADMISSION       # Financial/Environmental Concerns: none  # Asthma: noted on problem list        Social Drivers of Health    Housing Stability: High Risk (11/28/2024)    Housing Stability     Do you have housing? : No     Are you worried about losing your housing?: No   Physical Activity: Unknown (7/3/2024)    Exercise Vital Sign     Days of Exercise per Week: 1 day   Social Connections: Unknown (7/3/2024)    Social Connection and Isolation Panel [NHANES]     Frequency of Social Gatherings with Friends and Family: Never          Disposition Plan     Medically Ready for Discharge: Anticipated in 2-4 Days         Suraj Small MD  Hospitalist Service, GOLD TEAM 22  [facility]  Securely message with [employer] (more info)  Text page via [employer] Paging/Directory   See signed in " provider for up to date coverage information  ______________________________________________________________________    Interval History   More awake today and pain under better control.  Still needing the dilaudid every two hours so is tough when nursing can't get there right away.  Still wanting to avoid PCA administration for now.  Pain is mostly chest as is typical for her episodes.  Having some back pain now.  Reporting some shortness of breath.    Physical Exam   Vital Signs: Temp: 98.3  F (36.8  C) Temp src: Oral BP: 104/61 Pulse: 95   Resp: 18 SpO2: 92 % O2 Device: None (Room air)    Weight: 156 lbs 0 oz    Constitutional: awake, alert, cooperative, mild distress from pain (much improved from yesterday)  Respiratory: No increased work of breathing, good air exchange, faint wheezes at bases   Cardiovascular: Regular rate and rhythm, normal S1 and S2, and no murmur noted  GI: Noormal bowel sounds, soft, non-distended, non-tender  Neuropsychiatric:     General: restless and poor eye contact    Affect: anxious (improved from yesterday)    Medical Decision Making         Data     I have personally reviewed the following data over the past 24 hrs:    14.2 (H)  \   6.8 (LL)   / 442     137 101 4.5 (L) /  86   4.2 25 0.50 (L) \       Imaging results reviewed over the past 24 hrs:   Recent Results (from the past 24 hours)   NM Lung Scan Ventilation and Perfusion    Narrative    Examination:NM LUNG SCAN VENTILATION AND PERFUSION, 11/29/2024 3:14 PM      Indication:  c/f PE (contrast allergy)     Additional Information: none    D-Dimer: None    Technique:    The patient received 2.0 mCi of Tc-99m DTPA aerosol for ventilation  and 6.5 mCi of Tc-99m MAA for perfusion. Multiple images were obtained  of the lungs in Anterior, posterior, RICHTER, RPO, LPO, and  Hungarian  projections.    Comparison: Chest CT 11/27/2024. V/Q scan 8/19/2024.    Findings:    The ventilation study demonstrates heterogenous tracer  distribution  bilaterally. Small amount of tracer within the stomach.    The perfusion study demonstrates relatively homogenous tracer  distribution. No mismatched ventilation/perfusion defects identified.       Impression    Impression:    Pulmonary emboli is not present.    I have personally reviewed the examination and initial interpretation  and I agree with the findings.    ANGEL WHITE MD         SYSTEM ID:  [id number]

## 2024-11-30 NOTE — PLAN OF CARE
"Goal Outcome Evaluation:      Plan of Care Reviewed With: patient    Overall Patient Progress: no change    Outcome Evaluation: Pt will discharge home when medically ready.       VS:        Pt A/O X 4. Afebrile. VSS. /61 (BP Location: Left arm)   Pulse 95   Temp 98.3  F (36.8  C) (Oral)   Resp 18   Ht 1.626 m (5' 4\")   Wt 70.8 kg (156 lb)   LMP  (LMP Unknown)   SpO2 92%   BMI 26.78 kg/m    Lungs- clear bilaterally with both       anterior and posterior. Denies nausea, shortness of breath, and chest pain.      Output:        Bowels- active in all four quadrants. Voids spontaneously without difficulty in the bathroom.       Activity:        Pt up independently.       Skin:    Skin intact.      Pain:        Has generalized pain and given dilaudid, and is tolerating well.       CMS:        CMS and Neuro's are intact. Denies numbness and tingling in all extremities.      Dressing:        No incisional dressing.       Diet:        Pt is on a regular diet and appetite was fair this shift.        LDA:        Port a Cath is patent in the R chest and SL.       Equipment:        Pt belongings in the room. Bilateral heels are elevated off the bed.      Plan:        Pt is able to make needs known and the call light is within the pt's reach. Continue to monitor.       Additional Info:                   "

## 2024-11-30 NOTE — PLAN OF CARE
"Goal Outcome Evaluation:      Plan of Care Reviewed With: patient    Overall Patient Progress: no change    Outcome Evaluation: Plan: pain control. Pt will discharge home.      VS:       Pt A/O X 4. Afebrile. VSS. /63 (BP Location: Left arm)   Pulse 90   Temp 98.2  F (36.8  C) (Oral)   Resp 17   Ht 1.626 m (5' 4\")   Wt 70.8 kg (156 lb)   LMP  (LMP Unknown)   SpO2 (!) 86%   BMI 26.78 kg/m   Lungs- clear bilaterally with both       anterior and posterior. Denies nausea, shortness of breath, and chest pain.     Output:       Bowels- active in all four quadrants. Voids spontaneously without difficulty in the bathroom.      Activity:       Pt up independently.      Skin:   Skin intact.     Pain:       Has generalized pain and given dilaudid, and is tolerating well.      CMS:       CMS and Neuro's are intact. Denies numbness and tingling in all extremities.      Dressing:       No incisional dressing.      Diet:       Pt is on a regular diet and appetite was fair this shift.       LDA:       Port a Cath is patent in the R chest and SL.      Equipment:       Pt belongings in the room. Bilateral heels are elevated off the bed.     Plan:       Pt is able to make needs known and the call light is within the pt's reach. Continue to monitor.       Additional Info:              "

## 2024-12-01 LAB
ANION GAP SERPL CALCULATED.3IONS-SCNC: 12 MMOL/L (ref 7–15)
BUN SERPL-MCNC: 6 MG/DL (ref 6–20)
CALCIUM SERPL-MCNC: 9.1 MG/DL (ref 8.8–10.4)
CHLORIDE SERPL-SCNC: 100 MMOL/L (ref 98–107)
CREAT SERPL-MCNC: 0.49 MG/DL (ref 0.51–0.95)
EGFRCR SERPLBLD CKD-EPI 2021: >90 ML/MIN/1.73M2
ERYTHROCYTE [DISTWIDTH] IN BLOOD BY AUTOMATED COUNT: 25 % (ref 10–15)
GLUCOSE SERPL-MCNC: 100 MG/DL (ref 70–99)
HCO3 SERPL-SCNC: 25 MMOL/L (ref 22–29)
HCT VFR BLD AUTO: 21.3 % (ref 35–47)
HGB BLD-MCNC: 7.6 G/DL (ref 11.7–15.7)
MCH RBC QN AUTO: 29.3 PG (ref 26.5–33)
MCHC RBC AUTO-ENTMCNC: 35.7 G/DL (ref 31.5–36.5)
MCV RBC AUTO: 82 FL (ref 78–100)
PLATELET # BLD AUTO: 482 10E3/UL (ref 150–450)
POTASSIUM SERPL-SCNC: 4.1 MMOL/L (ref 3.4–5.3)
RBC # BLD AUTO: 2.59 10E6/UL (ref 3.8–5.2)
SODIUM SERPL-SCNC: 137 MMOL/L (ref 135–145)
WBC # BLD AUTO: 18.4 10E3/UL (ref 4–11)

## 2024-12-01 PROCEDURE — 120N000002 HC R&B MED SURG/OB UMMC

## 2024-12-01 PROCEDURE — 250N000011 HC RX IP 250 OP 636: Performed by: PEDIATRICS

## 2024-12-01 PROCEDURE — 99232 SBSQ HOSP IP/OBS MODERATE 35: CPT | Performed by: PEDIATRICS

## 2024-12-01 PROCEDURE — 250N000013 HC RX MED GY IP 250 OP 250 PS 637: Performed by: PEDIATRICS

## 2024-12-01 PROCEDURE — 85027 COMPLETE CBC AUTOMATED: CPT | Performed by: PEDIATRICS

## 2024-12-01 PROCEDURE — 250N000011 HC RX IP 250 OP 636: Performed by: NURSE PRACTITIONER

## 2024-12-01 PROCEDURE — 36591 DRAW BLOOD OFF VENOUS DEVICE: CPT | Performed by: PEDIATRICS

## 2024-12-01 PROCEDURE — 80048 BASIC METABOLIC PNL TOTAL CA: CPT | Performed by: PEDIATRICS

## 2024-12-01 PROCEDURE — 84520 ASSAY OF UREA NITROGEN: CPT | Performed by: PEDIATRICS

## 2024-12-01 PROCEDURE — 250N000013 HC RX MED GY IP 250 OP 250 PS 637: Performed by: STUDENT IN AN ORGANIZED HEALTH CARE EDUCATION/TRAINING PROGRAM

## 2024-12-01 PROCEDURE — 250N000012 HC RX MED GY IP 250 OP 636 PS 637: Performed by: PEDIATRICS

## 2024-12-01 PROCEDURE — 99207 PR NO BILLABLE SERVICE THIS VISIT: CPT | Performed by: INTERNAL MEDICINE

## 2024-12-01 RX ADMIN — HYDROMORPHONE HYDROCHLORIDE 1 MG: 1 INJECTION, SOLUTION INTRAMUSCULAR; INTRAVENOUS; SUBCUTANEOUS at 03:42

## 2024-12-01 RX ADMIN — ASPIRIN 81 MG CHEWABLE TABLET 81 MG: 81 TABLET CHEWABLE at 08:23

## 2024-12-01 RX ADMIN — HYDROMORPHONE HYDROCHLORIDE 1 MG: 1 INJECTION, SOLUTION INTRAMUSCULAR; INTRAVENOUS; SUBCUTANEOUS at 18:35

## 2024-12-01 RX ADMIN — HYDROMORPHONE HYDROCHLORIDE 1 MG: 1 INJECTION, SOLUTION INTRAMUSCULAR; INTRAVENOUS; SUBCUTANEOUS at 20:43

## 2024-12-01 RX ADMIN — HYDROMORPHONE HYDROCHLORIDE 1 MG: 1 INJECTION, SOLUTION INTRAMUSCULAR; INTRAVENOUS; SUBCUTANEOUS at 10:48

## 2024-12-01 RX ADMIN — HYDROMORPHONE HYDROCHLORIDE 1 MG: 1 INJECTION, SOLUTION INTRAMUSCULAR; INTRAVENOUS; SUBCUTANEOUS at 08:24

## 2024-12-01 RX ADMIN — PREDNISONE 20 MG: 20 TABLET ORAL at 08:23

## 2024-12-01 RX ADMIN — ONDANSETRON 4 MG: 2 INJECTION INTRAMUSCULAR; INTRAVENOUS at 15:14

## 2024-12-01 RX ADMIN — HYDROMORPHONE HYDROCHLORIDE 1 MG: 1 INJECTION, SOLUTION INTRAMUSCULAR; INTRAVENOUS; SUBCUTANEOUS at 01:35

## 2024-12-01 RX ADMIN — GABAPENTIN 600 MG: 300 CAPSULE ORAL at 22:45

## 2024-12-01 RX ADMIN — HYDROMORPHONE HYDROCHLORIDE 1 MG: 1 INJECTION, SOLUTION INTRAMUSCULAR; INTRAVENOUS; SUBCUTANEOUS at 06:14

## 2024-12-01 RX ADMIN — HYDROMORPHONE HYDROCHLORIDE 1 MG: 1 INJECTION, SOLUTION INTRAMUSCULAR; INTRAVENOUS; SUBCUTANEOUS at 22:45

## 2024-12-01 RX ADMIN — ONDANSETRON 4 MG: 2 INJECTION INTRAMUSCULAR; INTRAVENOUS at 08:24

## 2024-12-01 RX ADMIN — HYDROMORPHONE HYDROCHLORIDE 1 MG: 1 INJECTION, SOLUTION INTRAMUSCULAR; INTRAVENOUS; SUBCUTANEOUS at 12:46

## 2024-12-01 RX ADMIN — HYDROMORPHONE HYDROCHLORIDE 1 MG: 1 INJECTION, SOLUTION INTRAMUSCULAR; INTRAVENOUS; SUBCUTANEOUS at 15:11

## 2024-12-01 RX ADMIN — HYDROXYUREA 3000 MG: 500 CAPSULE ORAL at 08:23

## 2024-12-01 RX ADMIN — ASPIRIN 81 MG CHEWABLE TABLET 81 MG: 81 TABLET CHEWABLE at 20:42

## 2024-12-01 ASSESSMENT — ACTIVITIES OF DAILY LIVING (ADL)
ADLS_ACUITY_SCORE: 37

## 2024-12-01 NOTE — PROGRESS NOTES
[facility]    Medicine Progress Note - Hospitalist Service, GOLD TEAM 22    Date of Admission:  11/27/2024    Assessment & Plan      Jennifer Cervantes is a 24 year old F with PMH of PE, not on AC, SCD c/b CVA who p/w sickle cell crisis pain, ORTEGA, and cough.    Still with significant pain.  Requested to attempt Q2 hour intermittent dilaudid instead of PCA for now.  She is aware that nursing can't, due to staffing load, consistently administer every 2 hours but that they will do their best.  With 2L O2 by NC needed.  Hgb near baseline (6.8 today).  No indication for transfusion.  VQ scan negative for PE.  Concern for acute asthma exacerbation.    Today's Plan   - Steroid burst 20 mg daily, day 2 of 5 for asthma exacerbation   - Continue intermittent dilaudid Q2, as opposed to PCA, per patient request   - Continue other therapies for sickle cell pain crisis as outlined in Heme note   - Contact heme prior to any PRBC transfusion    #Acute hypoxic respiratory failure  #Hx of recurrent PE  #Contrast dye allergy  Hypoxic to 80s on arrival. Prior hx of PE raises concern for recurrence. Unfortunately is allergic to contrast (prior angioedema) and refused CT PA with premedication on d/w ED provider. Non-contrast CT was normal.  She was started on lovenox in the ED.  - f/u BLE doppler --> negative   - supplemental O2 prn  - mgmt of asthma as below    #Sickle cell pain crisis  Has a L chest port for simple transfusions (last a few weeks ago) and exchanges as needed. Reports she is on oxycodone 10 mg up to 6x per day at home but usually only takes 3-4x daily. Currently she is responsive to dilaudid IV 1 mg. Follows with Dr. Duncan  - hematology folowing  - continue home oxycodone  - continue IV dilaudid prn for breakthrough pain 1 mg q2h  - continue PTA hydroxyurea  - mgmt of hemochromatosis as below      #Asthma with acute exacerbation  Covid/flu negative. No s/sxs or concerning  "findings on CT chest (non-con) for bacterial PNA. Will manage conservatively at this time pending PE workup as above  - continue home inhalers  - outpatient f/u with pulmonology    #Leukocytosis - suspect reactive, monitor    #Hemochromatosis - continue PTA deferasirox and deferiprone    #Hx of CVA - continue PTA ASA bid        Diet: Regular Diet Adult    DVT Prophylaxis: Enoxaparin (Lovenox) SQ  Lopez Catheter: Not present  Lines: PRESENT      Port a Cath 11/25/24 Single Lumen Left Chest wall-Site Assessment: WDL      Cardiac Monitoring: None  Code Status: Full Code      Clinically Significant Risk Factors                       # Acute Hypoxic Respiratory Failure: Documented O2 saturation < 90%. Continue supplemental oxygen as needed           # Overweight: Estimated body mass index is 26.78 kg/m  as calculated from the following:    Height as of this encounter: 1.626 m (5' 4\").    Weight as of this encounter: 70.8 kg (156 lb)., PRESENT ON ADMISSION       # Financial/Environmental Concerns: none  # Asthma: noted on problem list        Social Drivers of Health    Housing Stability: High Risk (11/28/2024)    Housing Stability     Do you have housing? : No     Are you worried about losing your housing?: No   Physical Activity: Unknown (7/3/2024)    Exercise Vital Sign     Days of Exercise per Week: 1 day   Social Connections: Unknown (7/3/2024)    Social Connection and Isolation Panel [NHANES]     Frequency of Social Gatherings with Friends and Family: Never          Disposition Plan     Medically Ready for Discharge: 1-2 days       Suraj Small MD  Hospitalist Service, GOLD TEAM 22  [facility]  Securely message with [employer] (more info)  Text page via [facility] Paging/Directory   See signed in provider for up to date coverage information  ______________________________________________________________________    Interval History   More awake today and pain under better " control.  Still needing the dilaudid nearly every two to three hours.  Still wanting to avoid PCA administration.  Pain is mostly chest as is typical for her episodes.  Reporting some shortness of breath, unchanged from yesterday when steroids were started.    Physical Exam   Vital Signs: Temp: 97.6  F (36.4  C) Temp src: Oral BP: 124/73 Pulse: 94   Resp: 18 SpO2: 97 % O2 Device: Nasal cannula Oxygen Delivery: 3 LPM  Weight: 156 lbs 0 oz    Constitutional: awake, alert, cooperative, mild distress from pain   Respiratory: No increased work of breathing, good air exchange, no  wheezes (improvement)   Cardiovascular: Regular rate and rhythm, normal S1 and S2, and no murmur noted  GI: Noormal bowel sounds, soft, non-distended, non-tender  Neuropsychiatric:     General: restless and poor eye contact (better then yesterday)    Affect: anxious (improved from yesterday)    Medical Decision Making         Data     I have personally reviewed the following data over the past 24 hrs:    18.4 (H)  \   7.6 (L)   / 482 (H)     137 100 6.0 /  100 (H)   4.1 25 0.49 (L) \       Imaging results reviewed over the past 24 hrs:   No results found for this or any previous visit (from the past 24 hours).

## 2024-12-01 NOTE — PLAN OF CARE
Goal Outcome Evaluation:    Plan of Care Reviewed With: patient    Overall Patient Progress: no change    A/O x4  Independent in room  3 L O2 via oxymask  L port a cath SL  Pain poorly managed w/ PRN dilaudid  Regular diet  Continent B/B  Contact precautions maintained  No acute events this shift  Call light within reach, will continue to monitor and follow POC

## 2024-12-01 NOTE — PLAN OF CARE
"Goal Outcome Evaluation:      Plan of Care Reviewed With: patient    Overall Patient Progress: no change    Outcome Evaluation: Pts goal is to discharge tomorrow.      VS:        Pt A/O X 4. Afebrile. VSS./73 (BP Location: Left arm)   Pulse 94   Temp 97.6  F (36.4  C) (Oral)   Resp 18   Ht 1.626 m (5' 4\")   Wt 70.8 kg (156 lb)   LMP  (LMP Unknown)   SpO2 97%   BMI 26.78 kg/m    Lungs- clear bilaterally with both       anterior and posterior. Denies nausea, shortness of breath, and chest pain.      Output:        Bowels- active in all four quadrants. Voids spontaneously without difficulty in the bathroom.       Activity:        Pt up independently.       Skin:    Skin intact.      Pain:        Has generalized pain and given dilaudid, and is tolerating well.       CMS:        CMS and Neuro's are intact. Denies numbness and tingling in all extremities.      Dressing:        No incisional dressing.       Diet:        Pt is on a regular diet and appetite was fair this shift.        LDA:        Port a Cath is patent in the R chest and SL.       Equipment:        Pt belongings in the room. Bilateral heels are elevated off the bed.      Plan:        Pt is able to make needs known and the call light is within the pt's reach. Continue to monitor.       Additional Info:                 "

## 2024-12-01 NOTE — PROGRESS NOTES
[facility] Medicine Cross Cover Note    December 1, 2024 12:17 AM    I was notified by RN Criss that this patient had low O2 sat at rest (in the 80s) but reported this was not unusual for her when she takes dilaudid. She was placed on 3 LPM by nursing. She was also requesting PRN symbicort.    Action taken:   -Reviewed chart- noted reason for hospitalization and her use of supplementatal O2 PRN  -Reviewed chart and she was placed on fluticasone-vilanterol as a formulary substitution and this is not supposed to be taken more often than once daily unfortunately    Communicated plan in person.     I spent 15 minutes on the date of the encounter doing chart review, history and exam, documentation and further activities noted above.      Remedios Steiner MD on 12/1/2024 at 12:17 AM

## 2024-12-02 LAB
ERYTHROCYTE [DISTWIDTH] IN BLOOD BY AUTOMATED COUNT: 24.3 % (ref 10–15)
HCT VFR BLD AUTO: 21.9 % (ref 35–47)
HGB BLD-MCNC: 7.8 G/DL (ref 11.7–15.7)
MCH RBC QN AUTO: 30.6 PG (ref 26.5–33)
MCHC RBC AUTO-ENTMCNC: 35.6 G/DL (ref 31.5–36.5)
MCV RBC AUTO: 86 FL (ref 78–100)
PLATELET # BLD AUTO: 452 10E3/UL (ref 150–450)
RBC # BLD AUTO: 2.55 10E6/UL (ref 3.8–5.2)
WBC # BLD AUTO: 11.9 10E3/UL (ref 4–11)

## 2024-12-02 PROCEDURE — 120N000002 HC R&B MED SURG/OB UMMC

## 2024-12-02 PROCEDURE — 250N000013 HC RX MED GY IP 250 OP 250 PS 637: Performed by: STUDENT IN AN ORGANIZED HEALTH CARE EDUCATION/TRAINING PROGRAM

## 2024-12-02 PROCEDURE — 85018 HEMOGLOBIN: CPT | Performed by: PEDIATRICS

## 2024-12-02 PROCEDURE — 99233 SBSQ HOSP IP/OBS HIGH 50: CPT | Performed by: PEDIATRICS

## 2024-12-02 PROCEDURE — 250N000013 HC RX MED GY IP 250 OP 250 PS 637: Performed by: PEDIATRICS

## 2024-12-02 PROCEDURE — 85048 AUTOMATED LEUKOCYTE COUNT: CPT | Performed by: PEDIATRICS

## 2024-12-02 PROCEDURE — 250N000012 HC RX MED GY IP 250 OP 636 PS 637: Performed by: PEDIATRICS

## 2024-12-02 PROCEDURE — 36592 COLLECT BLOOD FROM PICC: CPT | Performed by: PEDIATRICS

## 2024-12-02 PROCEDURE — 250N000011 HC RX IP 250 OP 636: Performed by: PEDIATRICS

## 2024-12-02 RX ORDER — CYCLOBENZAPRINE HCL 10 MG
10 TABLET ORAL 3 TIMES DAILY
Status: DISCONTINUED | OUTPATIENT
Start: 2024-12-02 | End: 2024-12-04 | Stop reason: HOSPADM

## 2024-12-02 RX ADMIN — HYDROMORPHONE HYDROCHLORIDE 1 MG: 1 INJECTION, SOLUTION INTRAMUSCULAR; INTRAVENOUS; SUBCUTANEOUS at 19:13

## 2024-12-02 RX ADMIN — CYCLOBENZAPRINE 10 MG: 10 TABLET, FILM COATED ORAL at 21:17

## 2024-12-02 RX ADMIN — HYDROMORPHONE HYDROCHLORIDE 1 MG: 1 INJECTION, SOLUTION INTRAMUSCULAR; INTRAVENOUS; SUBCUTANEOUS at 21:17

## 2024-12-02 RX ADMIN — HYDROMORPHONE HYDROCHLORIDE 1 MG: 1 INJECTION, SOLUTION INTRAMUSCULAR; INTRAVENOUS; SUBCUTANEOUS at 15:04

## 2024-12-02 RX ADMIN — HYDROMORPHONE HYDROCHLORIDE 1 MG: 1 INJECTION, SOLUTION INTRAMUSCULAR; INTRAVENOUS; SUBCUTANEOUS at 16:58

## 2024-12-02 RX ADMIN — HYDROMORPHONE HYDROCHLORIDE 1 MG: 1 INJECTION, SOLUTION INTRAMUSCULAR; INTRAVENOUS; SUBCUTANEOUS at 10:52

## 2024-12-02 RX ADMIN — CYCLOBENZAPRINE 10 MG: 10 TABLET, FILM COATED ORAL at 15:04

## 2024-12-02 RX ADMIN — HYDROMORPHONE HYDROCHLORIDE 1 MG: 1 INJECTION, SOLUTION INTRAMUSCULAR; INTRAVENOUS; SUBCUTANEOUS at 02:43

## 2024-12-02 RX ADMIN — HYDROMORPHONE HYDROCHLORIDE 1 MG: 1 INJECTION, SOLUTION INTRAMUSCULAR; INTRAVENOUS; SUBCUTANEOUS at 06:41

## 2024-12-02 RX ADMIN — HYDROMORPHONE HYDROCHLORIDE 1 MG: 1 INJECTION, SOLUTION INTRAMUSCULAR; INTRAVENOUS; SUBCUTANEOUS at 23:21

## 2024-12-02 RX ADMIN — HYDROMORPHONE HYDROCHLORIDE 1 MG: 1 INJECTION, SOLUTION INTRAMUSCULAR; INTRAVENOUS; SUBCUTANEOUS at 13:03

## 2024-12-02 RX ADMIN — ASPIRIN 81 MG CHEWABLE TABLET 81 MG: 81 TABLET CHEWABLE at 21:17

## 2024-12-02 RX ADMIN — HYDROMORPHONE HYDROCHLORIDE 1 MG: 1 INJECTION, SOLUTION INTRAMUSCULAR; INTRAVENOUS; SUBCUTANEOUS at 08:33

## 2024-12-02 RX ADMIN — ASPIRIN 81 MG CHEWABLE TABLET 81 MG: 81 TABLET CHEWABLE at 08:40

## 2024-12-02 RX ADMIN — HYDROMORPHONE HYDROCHLORIDE 1 MG: 1 INJECTION, SOLUTION INTRAMUSCULAR; INTRAVENOUS; SUBCUTANEOUS at 00:47

## 2024-12-02 RX ADMIN — HYDROMORPHONE HYDROCHLORIDE 1 MG: 1 INJECTION, SOLUTION INTRAMUSCULAR; INTRAVENOUS; SUBCUTANEOUS at 04:44

## 2024-12-02 RX ADMIN — GABAPENTIN 600 MG: 300 CAPSULE ORAL at 21:17

## 2024-12-02 RX ADMIN — HYDROXYUREA 3000 MG: 500 CAPSULE ORAL at 08:42

## 2024-12-02 RX ADMIN — PREDNISONE 20 MG: 20 TABLET ORAL at 08:42

## 2024-12-02 ASSESSMENT — ACTIVITIES OF DAILY LIVING (ADL)
ADLS_ACUITY_SCORE: 37

## 2024-12-02 NOTE — PLAN OF CARE
Goal Outcome Evaluation: Generalized Back Pain R/T SCD    Plan of Care Reviewed With: patient  Overall Patient Progress: no change    A/Ox4, VS WNL. Ambulatory and independent. Denied dizziness, light-headedness, SOB and chestpain. Tolerating regular diet. Voiding spontaneously and has regular bowel movement. Back pain rated 8/10 and was given with IV Dilaudid PRN q2 via R port. CMS intact. Continue POC.

## 2024-12-02 NOTE — PROGRESS NOTES
[facility]    Medicine Progress Note - Hospitalist Service, GOLD TEAM 22    Date of Admission:  11/27/2024    Assessment & Plan      Jennifer Cervantes is a 24 year old F with PMH of PE, not on AC, SCD c/b CVA who p/w sickle cell crisis pain, ORTEGA, and cough.    Overall chest pain improved.  Now off O2 by NC needed.  No indication for transfusion.  VQ scan negative for PE. On brief course of steroids for acute asthma exacerbation.  Now having back pain, likely musculoskeletal.    Today's Plan   - Steroid burst 20 mg daily, day 3 of 5 for asthma exacerbation   - Continue intermittent dilaudid Q2, as opposed to PCA, per patient request   - Try muscle relaxant for back pain   - Continue other therapies for sickle cell pain crisis as outlined in Heme note   - Contact heme prior to any PRBC transfusion    #Acute hypoxic respiratory failure  #Hx of recurrent PE  #Contrast dye allergy  Hypoxic to 80s on arrival. Prior hx of PE raises concern for recurrence. Unfortunately is allergic to contrast (prior angioedema) and refused CT PA with premedication on d/w ED provider. Non-contrast CT was normal.  She was started on lovenox in the ED.  - f/u BLE doppler --> negative   - supplemental O2 prn  - mgmt of asthma as below    #Sickle cell pain crisis  Has a L chest port for simple transfusions (last a few weeks ago) and exchanges as needed. Reports she is on oxycodone 10 mg up to 6x per day at home but usually only takes 3-4x daily. Currently she is responsive to dilaudid IV 1 mg. Follows with Dr. Duncan  - hematology folowing  - continue home oxycodone  - continue IV dilaudid prn for breakthrough pain 1 mg q2h  - continue PTA hydroxyurea  - mgmt of hemochromatosis as below      #Asthma with acute exacerbation  Covid/flu negative. No s/sxs or concerning findings on CT chest (non-con) for bacterial PNA. Will manage conservatively at this time pending PE workup as above  - continue home  "inhalers  - outpatient f/u with pulmonology    #Leukocytosis - suspect reactive, monitor    #Hemochromatosis - continue PTA deferasirox and deferiprone    #Hx of CVA - continue PTA ASA bid        Diet: Regular Diet Adult    DVT Prophylaxis: Enoxaparin (Lovenox) SQ  Lopez Catheter: Not present  Lines: PRESENT      Port a Cath 11/25/24 Single Lumen Left Chest wall-Site Assessment: WDL      Cardiac Monitoring: None  Code Status: Full Code      Clinically Significant Risk Factors                                # Overweight: Estimated body mass index is 26.78 kg/m  as calculated from the following:    Height as of this encounter: 1.626 m (5' 4\").    Weight as of this encounter: 70.8 kg (156 lb).        # Financial/Environmental Concerns: none  # Asthma: noted on problem list        Social Drivers of Health    Housing Stability: High Risk (11/28/2024)    Housing Stability     Do you have housing? : No     Are you worried about losing your housing?: No   Physical Activity: Unknown (7/3/2024)    Exercise Vital Sign     Days of Exercise per Week: 1 day   Social Connections: Unknown (7/3/2024)    Social Connection and Isolation Panel [NHANES]     Frequency of Social Gatherings with Friends and Family: Never          Disposition Plan     Medically Ready for Discharge: 1-2 days       Suraj Small MD  Hospitalist Service, GOLD TEAM 22  [facility]  Securely message with [employer] (more info)  Text page via [employer] Paging/Directory   See signed in provider for up to date coverage information  ______________________________________________________________________    Interval History   More awake today and pain under better control.  Having back pain for the past two days.  Has not been out of bed much since admission.  Now off oxygen and breathing better.    Physical Exam   Vital Signs: Temp: 98.2  F (36.8  C) Temp src: Oral BP: 112/67 Pulse: 79   Resp: 18 SpO2: 90 % O2 Device: Nasal " cannula Oxygen Delivery: 2 LPM  Weight: 156 lbs 0 oz    Constitutional: awake, alert, cooperative, mild distress from pain (back now instead of chest)  Respiratory: No increased work of breathing, good air exchange, no  wheezes (improvement)   Cardiovascular: Regular rate and rhythm, normal S1 and S2, and no murmur noted  GI: Noormal bowel sounds, soft, non-distended, non-tender  Neuropsychiatric:     General: restless and poor eye contact (better then yesterday)    Affect: anxious (improved from yesterday)    Medical Decision Making         Data     I have personally reviewed the following data over the past 24 hrs:    11.9 (H)  \   7.8 (L)   / 452 (H)     N/A N/A N/A /  N/A   N/A N/A N/A \       Imaging results reviewed over the past 24 hrs:   No results found for this or any previous visit (from the past 24 hours).

## 2024-12-02 NOTE — PLAN OF CARE
Goal Outcome Evaluation:           Overall Patient Progress: no changeOverall Patient Progress: no change    Outcome Evaluation: .    A&Ox4. Denies nausea, SOB, and chest pain.On 2L 02 NC, sats >92%  Up ind. Back pain: 6-7/10- managed with IV dilaudid via L/chest port.   Disposition: discharge in 1-2 days  Continue with POC.

## 2024-12-03 ENCOUNTER — NURSE TRIAGE (OUTPATIENT)
Dept: ONCOLOGY | Facility: CLINIC | Age: 24
End: 2024-12-03
Payer: COMMERCIAL

## 2024-12-03 LAB
ERYTHROCYTE [DISTWIDTH] IN BLOOD BY AUTOMATED COUNT: 25.5 % (ref 10–15)
HCT VFR BLD AUTO: 20.7 % (ref 35–47)
HGB BLD-MCNC: 7.3 G/DL (ref 11.7–15.7)
MCH RBC QN AUTO: 30.2 PG (ref 26.5–33)
MCHC RBC AUTO-ENTMCNC: 35.3 G/DL (ref 31.5–36.5)
MCV RBC AUTO: 86 FL (ref 78–100)
PLATELET # BLD AUTO: 408 10E3/UL (ref 150–450)
RBC # BLD AUTO: 2.42 10E6/UL (ref 3.8–5.2)
WBC # BLD AUTO: 10.7 10E3/UL (ref 4–11)

## 2024-12-03 PROCEDURE — 250N000013 HC RX MED GY IP 250 OP 250 PS 637: Performed by: PEDIATRICS

## 2024-12-03 PROCEDURE — 85027 COMPLETE CBC AUTOMATED: CPT | Performed by: PEDIATRICS

## 2024-12-03 PROCEDURE — 250N000012 HC RX MED GY IP 250 OP 636 PS 637: Performed by: PEDIATRICS

## 2024-12-03 PROCEDURE — 250N000011 HC RX IP 250 OP 636: Performed by: PEDIATRICS

## 2024-12-03 PROCEDURE — 36591 DRAW BLOOD OFF VENOUS DEVICE: CPT | Performed by: PEDIATRICS

## 2024-12-03 PROCEDURE — 99233 SBSQ HOSP IP/OBS HIGH 50: CPT | Performed by: STUDENT IN AN ORGANIZED HEALTH CARE EDUCATION/TRAINING PROGRAM

## 2024-12-03 PROCEDURE — 250N000013 HC RX MED GY IP 250 OP 250 PS 637: Performed by: STUDENT IN AN ORGANIZED HEALTH CARE EDUCATION/TRAINING PROGRAM

## 2024-12-03 PROCEDURE — 120N000002 HC R&B MED SURG/OB UMMC

## 2024-12-03 RX ADMIN — HYDROMORPHONE HYDROCHLORIDE 1 MG: 1 INJECTION, SOLUTION INTRAMUSCULAR; INTRAVENOUS; SUBCUTANEOUS at 22:10

## 2024-12-03 RX ADMIN — HYDROMORPHONE HYDROCHLORIDE 1 MG: 1 INJECTION, SOLUTION INTRAMUSCULAR; INTRAVENOUS; SUBCUTANEOUS at 08:49

## 2024-12-03 RX ADMIN — HYDROMORPHONE HYDROCHLORIDE 1 MG: 1 INJECTION, SOLUTION INTRAMUSCULAR; INTRAVENOUS; SUBCUTANEOUS at 20:03

## 2024-12-03 RX ADMIN — HYDROMORPHONE HYDROCHLORIDE 1 MG: 1 INJECTION, SOLUTION INTRAMUSCULAR; INTRAVENOUS; SUBCUTANEOUS at 06:16

## 2024-12-03 RX ADMIN — HYDROMORPHONE HYDROCHLORIDE 1 MG: 1 INJECTION, SOLUTION INTRAMUSCULAR; INTRAVENOUS; SUBCUTANEOUS at 17:48

## 2024-12-03 RX ADMIN — CYCLOBENZAPRINE 10 MG: 10 TABLET, FILM COATED ORAL at 08:55

## 2024-12-03 RX ADMIN — HYDROMORPHONE HYDROCHLORIDE 1 MG: 1 INJECTION, SOLUTION INTRAMUSCULAR; INTRAVENOUS; SUBCUTANEOUS at 04:08

## 2024-12-03 RX ADMIN — ACETAMINOPHEN 500 MG: 500 TABLET ORAL at 22:10

## 2024-12-03 RX ADMIN — HYDROXYUREA 3000 MG: 500 CAPSULE ORAL at 08:54

## 2024-12-03 RX ADMIN — CYCLOBENZAPRINE 10 MG: 10 TABLET, FILM COATED ORAL at 20:03

## 2024-12-03 RX ADMIN — CYCLOBENZAPRINE 10 MG: 10 TABLET, FILM COATED ORAL at 13:27

## 2024-12-03 RX ADMIN — HYDROMORPHONE HYDROCHLORIDE 1 MG: 1 INJECTION, SOLUTION INTRAMUSCULAR; INTRAVENOUS; SUBCUTANEOUS at 13:27

## 2024-12-03 RX ADMIN — ASPIRIN 81 MG CHEWABLE TABLET 81 MG: 81 TABLET CHEWABLE at 20:03

## 2024-12-03 RX ADMIN — PREDNISONE 20 MG: 20 TABLET ORAL at 08:54

## 2024-12-03 RX ADMIN — GABAPENTIN 600 MG: 300 CAPSULE ORAL at 22:10

## 2024-12-03 RX ADMIN — HYDROMORPHONE HYDROCHLORIDE 1 MG: 1 INJECTION, SOLUTION INTRAMUSCULAR; INTRAVENOUS; SUBCUTANEOUS at 01:48

## 2024-12-03 RX ADMIN — HYDROMORPHONE HYDROCHLORIDE 1 MG: 1 INJECTION, SOLUTION INTRAMUSCULAR; INTRAVENOUS; SUBCUTANEOUS at 11:21

## 2024-12-03 RX ADMIN — HYDROMORPHONE HYDROCHLORIDE 1 MG: 1 INJECTION, SOLUTION INTRAMUSCULAR; INTRAVENOUS; SUBCUTANEOUS at 15:46

## 2024-12-03 RX ADMIN — ASPIRIN 81 MG CHEWABLE TABLET 81 MG: 81 TABLET CHEWABLE at 08:55

## 2024-12-03 ASSESSMENT — ACTIVITIES OF DAILY LIVING (ADL)
ADLS_ACUITY_SCORE: 37

## 2024-12-03 NOTE — PROGRESS NOTES
"VS: /63 (BP Location: Left arm)   Pulse 83   Temp 98.2  F (36.8  C) (Oral)   Resp 18   Ht 1.626 m (5' 4\")   Wt 70.8 kg (156 lb)   LMP  (LMP Unknown)   SpO2 91%   BMI 26.78 kg/m      Orientation Pt is Alert and Oriented x 4   Respiratory Lung sounds clear bilaterally. No cough. Denies SOB, no accessory muscles used. Pt saturations low 90s on Room Air.    Mobility/ Activity Independent.    Bowel/Bladder: Last BM: 12/1.    IV /LDA Pt has L port access.    Diet Regular diet- pt has very little appetite.    Gastrointestinal Denies N/V/D. Abdomen is soft and non-tender. Bowel sounds Active.    Skin / Wounds / Dressings: No outstanding skin issues noted.    Pain Having pain in back rating between 3 and 6 today    Equipment: None    Circulation/ Sensation Denies numbness or tingling. Circulatory, Motor, and sensory intact.    PRNS Dilaudid every 2 hours per pt request.    Outcome Evaluation/ Plan Continue to monitor. Pt stated she will most likely feel ready to discharge tomorrow    Additional info:      Plan of care reviewed with: Patient   Overall Patient Progress: Progressing    "

## 2024-12-03 NOTE — PROGRESS NOTES
"VS: /67 (BP Location: Left arm)   Pulse 79   Temp 98.2  F (36.8  C) (Oral)   Resp 18   Ht 1.626 m (5' 4\")   Wt 70.8 kg (156 lb)   LMP  (LMP Unknown)   SpO2 90%   BMI 26.78 kg/m      Orientation Pt is Alert and Oriented x 4   Respiratory Lung sounds clear bilaterally. No cough. Denies SOB, no accessory muscles used. Pt saturations  90%  on room air    Mobility/ Activity Ind. R arm contracted and walks with limp   Bowel/Bladder: Last BM: 12/1   IV /LDA L port SL   Diet Regular- little appetite    Gastrointestinal Denies N/V/D. Abdomen is soft and non tender. Bowel sounds Active.    Skin / Wounds / Dressings: No outstanding skin issues noted.    Pain Having pain in back that pt rates between 6 and 9   Equipment: None    Circulation/ Sensation Denies numbness or tingling. Circulatory, Motor, and sensory intact.    PRNS Dilaudid every 2 hours pt is calling to request    Outcome Evaluation/ Plan Continue to monitor   Additional info: Flexeril added for pain      Plan of care reviewed with: Patient   Overall Patient Progress: Progressing    "

## 2024-12-03 NOTE — TELEPHONE ENCOUNTER
Call from pt, who states she is still in the hospital, thinks she will be discharged tomorrow. She wants to be sure to keep her Jr appt on Thursday 12/5, as she thinks she will be discharged before then. She wanted to pass on message to care team.   Writer informed appt is still scheduled, informed will update MAURISIO Mcgee.

## 2024-12-03 NOTE — PLAN OF CARE
Goal Outcome Evaluation:      Plan of Care Reviewed With: patient    Overall Patient Progress: no changeOverall Patient Progress: no change    Shift [id number]    Changes this shift: No acute changes overnight    Neuro: Alert and orientated x4.    Cardiac: HR WNL, normotensive. Afebrile    Respiratory: O2 sat > 90% on O2 NC 1LPM-2LPM overnight, weaned down to 0.5 LPM overight    GI/: Continent B&B     Diet: Regular diet,     Pain: Pain managed with scheduled medication and PRN dilaudid    Skin: WNL    LDA's: L Port SL    Activity: Independent, right arm contracted and walks with limp    Plan: Call light within reach, able to make needs known, continue with plan of care.

## 2024-12-03 NOTE — PROGRESS NOTES
[facility]    Medicine Progress Note - Hospitalist Service, GOLD TEAM 22    Date of Admission:  11/27/2024    Assessment & Plan     Jennifer Cervantes is a 24 year old F with PMH of PE, not on AC, SCD c/b CVA who p/w sickle cell crisis pain, OTREGA, and cough. On steroid burst for asthma exacerbation and showing gradual improvement.    Interval updates:  - Pain improved, but still requiring IV treatment  - Day 4/5 for steroid burst    #Acute hypoxic respiratory failure  #Asthma with acute exacerbation  Covid/flu negative. No s/sxs or concerning findings on CT chest (non-con) for bacterial PNA.  - continue home inhalers  - steroid burst x5 days (11/30-12/5)  - outpatient f/u with pulmonology    #Sickle cell pain crisis  Has a L chest port for simple transfusions (last a few weeks ago) and exchanges as needed. Reports she is on oxycodone 10 mg up to 6x per day at home but usually only takes 3-4x daily. Currently she is responsive to dilaudid IV 1 mg. Follows with Dr. Duncan.  - hematology folowing  - continue home oxycodone  - continue IV dilaudid prn for breakthrough pain 1 mg q2h  - continue PTA hydroxyurea    #Hx of recurrent PE  #Contrast dye allergy  Hypoxic to 80s on arrival. Prior hx of PE raises concern for recurrence. Unfortunately is allergic to contrast (prior angioedema) and refused CT PA with premedication on d/w ED provider. Bilateral doppler US negative for DVT and V/Q scan negative for PE.    #Leukocytosis - suspect reactive, monitor    #Hemochromatosis - deferiprone not on formulary, holding while admitted    #Hx of CVA - continue PTA ASA bid          Diet: Regular Diet Adult    DVT Prophylaxis: PCDs  Lopez Catheter: Not present  Lines: PRESENT             Cardiac Monitoring: None  Code Status: Full Code      Clinically Significant Risk Factors                              # Overweight: Estimated body mass index is 26.78 kg/m  as calculated from the following:     "Height as of this encounter: 1.626 m (5' 4\").    Weight as of this encounter: 70.8 kg (156 lb).      # Financial/Environmental Concerns: none  # Asthma: noted on problem list        Social Drivers of Health    Housing Stability: High Risk (11/28/2024)    Housing Stability     Do you have housing? : No     Are you worried about losing your housing?: No   Physical Activity: Unknown (7/3/2024)    Exercise Vital Sign     Days of Exercise per Week: 1 day   Social Connections: Unknown (7/3/2024)    Social Connection and Isolation Panel [NHANES]     Frequency of Social Gatherings with Friends and Family: Never          Disposition Plan     Medically Ready for Discharge: Anticipated Tomorrow             Patricia Varma MD  Hospitalist Service, GOLD TEAM 22  [facility]  Securely message with [employer] (more info)  Text page via Parse Paging/Directory   See signed in provider for up to date coverage information  ______________________________________________________________________    Interval History   No acute events.  Continues to report pain primarily in her back.  She does think that it is overall improving, though she still thinks that she needs some IV pain control to support it.  She denies any breathing symptoms including no shortness of breath, wheezing, coughing.    Physical Exam   Vital Signs: Temp: 98.6  F (37  C) Temp src: Oral BP: 112/67 Pulse: 89   Resp: 18 SpO2: 96 % O2 Device: Nasal cannula Oxygen Delivery: 1/2 LPM  Weight: 156 lbs 0 oz    General Appearance: Resting comfortably in bed, NAD  Respiratory: Breathing nonlabored, weaned to room air while I was in the room and stable to low 90s, CTAB, no wheezing  Cardiovascular: RRR  GI: Soft, nontender  Other: Alert and oriented    Medical Decision Making               Data     I have personally reviewed the following data over the past 24 hrs:    10.7  \   7.3 (L)   / 408     N/A N/A N/A /  N/A   N/A N/A N/A \     "   Imaging results reviewed over the past 24 hrs:   No results found for this or any previous visit (from the past 24 hours).

## 2024-12-04 VITALS
WEIGHT: 156 LBS | HEIGHT: 64 IN | DIASTOLIC BLOOD PRESSURE: 62 MMHG | BODY MASS INDEX: 26.63 KG/M2 | OXYGEN SATURATION: 90 % | HEART RATE: 96 BPM | SYSTOLIC BLOOD PRESSURE: 109 MMHG | RESPIRATION RATE: 18 BRPM | TEMPERATURE: 98 F

## 2024-12-04 LAB
ANION GAP SERPL CALCULATED.3IONS-SCNC: 10 MMOL/L (ref 7–15)
BUN SERPL-MCNC: 6.5 MG/DL (ref 6–20)
CALCIUM SERPL-MCNC: 9.1 MG/DL (ref 8.8–10.4)
CHLORIDE SERPL-SCNC: 99 MMOL/L (ref 98–107)
CREAT SERPL-MCNC: 0.48 MG/DL (ref 0.51–0.95)
EGFRCR SERPLBLD CKD-EPI 2021: >90 ML/MIN/1.73M2
ERYTHROCYTE [DISTWIDTH] IN BLOOD BY AUTOMATED COUNT: 24.2 % (ref 10–15)
GLUCOSE SERPL-MCNC: 80 MG/DL (ref 70–99)
HCO3 SERPL-SCNC: 27 MMOL/L (ref 22–29)
HCT VFR BLD AUTO: 20.5 % (ref 35–47)
HGB BLD-MCNC: 7.3 G/DL (ref 11.7–15.7)
MCH RBC QN AUTO: 31.3 PG (ref 26.5–33)
MCHC RBC AUTO-ENTMCNC: 35.6 G/DL (ref 31.5–36.5)
MCV RBC AUTO: 88 FL (ref 78–100)
PLATELET # BLD AUTO: 373 10E3/UL (ref 150–450)
POTASSIUM SERPL-SCNC: 3.9 MMOL/L (ref 3.4–5.3)
RBC # BLD AUTO: 2.33 10E6/UL (ref 3.8–5.2)
SODIUM SERPL-SCNC: 136 MMOL/L (ref 135–145)
WBC # BLD AUTO: 10.5 10E3/UL (ref 4–11)

## 2024-12-04 PROCEDURE — 36592 COLLECT BLOOD FROM PICC: CPT | Performed by: PEDIATRICS

## 2024-12-04 PROCEDURE — 85018 HEMOGLOBIN: CPT | Performed by: PEDIATRICS

## 2024-12-04 PROCEDURE — 99238 HOSP IP/OBS DSCHRG MGMT 30/<: CPT | Performed by: STUDENT IN AN ORGANIZED HEALTH CARE EDUCATION/TRAINING PROGRAM

## 2024-12-04 PROCEDURE — 250N000013 HC RX MED GY IP 250 OP 250 PS 637: Performed by: STUDENT IN AN ORGANIZED HEALTH CARE EDUCATION/TRAINING PROGRAM

## 2024-12-04 PROCEDURE — 250N000012 HC RX MED GY IP 250 OP 636 PS 637: Performed by: PEDIATRICS

## 2024-12-04 PROCEDURE — 250N000013 HC RX MED GY IP 250 OP 250 PS 637: Performed by: PEDIATRICS

## 2024-12-04 PROCEDURE — 250N000011 HC RX IP 250 OP 636: Performed by: PEDIATRICS

## 2024-12-04 PROCEDURE — 80048 BASIC METABOLIC PNL TOTAL CA: CPT | Performed by: PEDIATRICS

## 2024-12-04 RX ORDER — OXYCODONE HYDROCHLORIDE 10 MG/1
10 TABLET ORAL EVERY 6 HOURS PRN
Qty: 15 TABLET | Refills: 0 | Status: ON HOLD | OUTPATIENT
Start: 2024-12-04 | End: 2024-12-10

## 2024-12-04 RX ADMIN — PREDNISONE 20 MG: 20 TABLET ORAL at 08:44

## 2024-12-04 RX ADMIN — HYDROMORPHONE HYDROCHLORIDE 1 MG: 1 INJECTION, SOLUTION INTRAMUSCULAR; INTRAVENOUS; SUBCUTANEOUS at 11:30

## 2024-12-04 RX ADMIN — HYDROXYUREA 3000 MG: 500 CAPSULE ORAL at 08:44

## 2024-12-04 RX ADMIN — HYDROMORPHONE HYDROCHLORIDE 1 MG: 1 INJECTION, SOLUTION INTRAMUSCULAR; INTRAVENOUS; SUBCUTANEOUS at 00:13

## 2024-12-04 RX ADMIN — HYDROMORPHONE HYDROCHLORIDE 1 MG: 1 INJECTION, SOLUTION INTRAMUSCULAR; INTRAVENOUS; SUBCUTANEOUS at 08:48

## 2024-12-04 RX ADMIN — HYDROMORPHONE HYDROCHLORIDE 1 MG: 1 INJECTION, SOLUTION INTRAMUSCULAR; INTRAVENOUS; SUBCUTANEOUS at 06:14

## 2024-12-04 RX ADMIN — HYDROMORPHONE HYDROCHLORIDE 1 MG: 1 INJECTION, SOLUTION INTRAMUSCULAR; INTRAVENOUS; SUBCUTANEOUS at 04:13

## 2024-12-04 RX ADMIN — ASPIRIN 81 MG CHEWABLE TABLET 81 MG: 81 TABLET CHEWABLE at 08:44

## 2024-12-04 RX ADMIN — CYCLOBENZAPRINE 10 MG: 10 TABLET, FILM COATED ORAL at 13:31

## 2024-12-04 RX ADMIN — HYDROMORPHONE HYDROCHLORIDE 1 MG: 1 INJECTION, SOLUTION INTRAMUSCULAR; INTRAVENOUS; SUBCUTANEOUS at 13:33

## 2024-12-04 RX ADMIN — HYDROMORPHONE HYDROCHLORIDE 1 MG: 1 INJECTION, SOLUTION INTRAMUSCULAR; INTRAVENOUS; SUBCUTANEOUS at 15:34

## 2024-12-04 RX ADMIN — HYDROMORPHONE HYDROCHLORIDE 1 MG: 1 INJECTION, SOLUTION INTRAMUSCULAR; INTRAVENOUS; SUBCUTANEOUS at 02:13

## 2024-12-04 RX ADMIN — CYCLOBENZAPRINE 10 MG: 10 TABLET, FILM COATED ORAL at 08:44

## 2024-12-04 ASSESSMENT — ACTIVITIES OF DAILY LIVING (ADL)
ADLS_ACUITY_SCORE: 37
ADLS_ACUITY_SCORE: 36
ADLS_ACUITY_SCORE: 37
ADLS_ACUITY_SCORE: 36
ADLS_ACUITY_SCORE: 37
ADLS_ACUITY_SCORE: 37
ADLS_ACUITY_SCORE: 36
ADLS_ACUITY_SCORE: 37
ADLS_ACUITY_SCORE: 36
ADLS_ACUITY_SCORE: 37
ADLS_ACUITY_SCORE: 36

## 2024-12-04 NOTE — PROGRESS NOTES
"VS: /62   Pulse 96   Temp 98  F (36.7  C) (Oral)   Resp 18   Ht 1.626 m (5' 4\")   Wt 70.8 kg (156 lb)   LMP  (LMP Unknown)   SpO2 90%   BMI 26.78 kg/m     Orientation Pt is Alert and Oriented x 4   Respiratory Lung sounds clear bilaterally. No cough. Denies SOB, no accessory muscles used. Pt saturations in the 90's on room air    Mobility/ Activity Independent    Bowel/Bladder: Last BM:    IV /LDA Pt has L port access   Diet Regular diet   Gastrointestinal Denies N/V/D. Abdomen is soft and non tender. Bowel sounds Active.    Skin / Wounds / Dressings: No outstanding skin issues    Pain Pt rates pain a 6   Equipment: None   Circulation/ Sensation Denies numbness or tingling. Circulatory, Motor, and sensory intact.    PRNS Dilaudid every 2 hours per pt request    Outcome Evaluation/ Plan Continue to monitor   Additional info:      Plan of care reviewed with: Patient   Overall Patient Progress: Progressing  Education Completed with Patient:   "

## 2024-12-04 NOTE — PLAN OF CARE
Goal Outcome Evaluation:      Plan of Care Reviewed With: patient    Overall Patient Progress: no changeOverall Patient Progress: no change    Shift [id number]     Changes this shift: No acute changes overnight     Neuro: Alert and orientated x4.     Cardiac: HR WNL, normotensive. Afebrile     Respiratory: O2 sat > 90% on O2 NC 0.5 LPM -2 LPM overnight off and on, weaned O2 as tolerated     GI/: Continent B&B      Diet: Regular diet,      Pain: Pain managed with scheduled medication and PRN dilaudid     Skin: WNL     LDA's: L Port SL     Activity: Independent, right arm contracted and walks with limp     Plan: Call light within reach, able to make needs known, continue with plan of care. Possible discharge today

## 2024-12-04 NOTE — PROGRESS NOTES
"VS: /62   Pulse 96   Temp 98  F (36.7  C) (Oral)   Resp 18   Ht 1.626 m (5' 4\")   Wt 70.8 kg (156 lb)   LMP  (LMP Unknown)   SpO2 90%   BMI 26.78 kg/m      Orientation Pt is Alert and Oriented x 4   Respiratory Lung sounds clear bilaterally. No cough. Denies SOB, no accessory muscles used. Pt saturations 91% on room air    Mobility/ Activity Pt is up independently, walks with slight limp.    Bowel/Bladder: Last BM: 12/1   IV /LDA Left Port    Diet Regular- little appetite   Gastrointestinal Denies N/V/D. Abdomen is soft and non tender. Bowel sounds Active.    Skin / Wounds / Dressings: No outstanding skin issues noted    Pain Rating pain between 3 and 6. Pain is in the back    Equipment: None    Circulation/ Sensation Denies numbness or tingling. Circulatory, Motor, and sensory intact.    PRNS Dilaudid Q2 hours per pt request    Outcome Evaluation/ Plan Continue to monitor. Pt feeling ready to discharge.    Additional info:      Plan of care reviewed with: Patient   Overall Patient Progress: Progressing  Education Completed with Patient:   DISCHARGE SUMMARY    Pt discharging to: Home        Transportation: [employer]  AVS given and discussed: Pt was given AVS and pt states understanding of content. Pt has no further questions.   Medications given: Yes, discussed. No further questions. Sent Home with Oxycodone    Belongings returned: Yes, ensured all belongings packed and sent with pt. No items in security.   Comments: Escorted safely to elevators. Pt left at 1600      "

## 2024-12-04 NOTE — DISCHARGE SUMMARY
"[facility]  Hospitalist Discharge Summary      Date of Admission:  11/27/2024  Date of Discharge:  12/4/2024  Discharging Provider: Patricia Varma MD  Discharge Service: Hospitalist Service, GOLD TEAM 22    Discharge Diagnoses   See below    Clinically Significant Risk Factors     # Overweight: Estimated body mass index is 26.78 kg/m  as calculated from the following:    Height as of this encounter: 1.626 m (5' 4\").    Weight as of this encounter: 70.8 kg (156 lb).       Follow-ups Needed After Discharge   Follow-up Appointments       Adult [facility] Follow-up and recommended labs and tests      Follow up with your hematology team as planned. Jr infusion appointment is tomorrow, 12/5.    Appointments on [place] and/or [facility] (with [facility] or [facility] provider or service). Call [phone] if you haven't heard regarding these appointments within 7 days of discharge.                Unresulted Labs Ordered in the Past 30 Days of this Admission       No orders found from 10/28/2024 to 11/28/2024.        These results will be followed up by n/a    Discharge Disposition   Discharged to home  Condition at discharge: Stable    Hospital Course     Jennifer Cervantes is a 24 year old F with PMH of PE, not on AC, SCD c/b CVA who p/w sickle cell crisis pain, ORTEGA, and cough.    #Sickle cell pain crisis  Has a L chest port for simple transfusions (last a few weeks ago) and exchanges as needed. Reports she is on oxycodone 10 mg up to 6x per day at home but usually only takes 3-4x daily. Treated with IV dilaudid (no PCA per her preference due to recently weaning off significantly higher doses of opioids) and had improvement. Felt pain was manageable at home on the day of discharge. She is scheduled for jr infusion and to see her hematology team tomorrow (12/5) so has close follow up.    #Acute hypoxic respiratory failure  #Asthma with acute exacerbation  Covid/flu negative. No " s/sxs or concerning findings on CT chest (non-con) for bacterial PNA. Received 5x days prednisone 40 mg during admission for suspected asthma exacerbation and was gradually weaned off O2 after this was started.    #Hx of recurrent PE  #Contrast dye allergy  Hypoxic to 80s on arrival. Prior hx of PE raises concern for recurrence. Unfortunately is allergic to contrast (prior angioedema) and refused CT PA with premedication on d/w ED provider. Bilateral doppler US negative for DVT and V/Q scan negative for PE.    #Leukocytosis - suspect reactive, improved to wnl during admission    #Hemochromatosis - deferiprone not on formulary, held while admitted    #Hx of CVA - continued PTA ASA bid    Consultations This Hospital Stay   HEMATOLOGY ADULT IP CONSULT  CARE MANAGEMENT / SOCIAL WORK IP CONSULT    Code Status   Full Code    Time Spent on this Encounter   I, Patricia Varma MD, personally saw the patient today and spent less than or equal to 30 minutes discharging this patient.       Patricia Varma MD  [facility] [address]  [facility] [facility] [address]  Phone: [phone]  Fax: [phone]  ______________________________________________________________________    Physical Exam   Vital Signs: Temp: 98  F (36.7  C) Temp src: Oral BP: 109/62 Pulse: 96   Resp: 18 SpO2: 90 % O2 Device: None (Room air)   Weight: 156 lbs 0 oz  General Appearance: Sitting up in bed, appears well, NAD  Respiratory: Breathing non-labored on RA, CTAB, no wheezes  Cardiovascular: RRR  GI: Soft, nontender  Other: Alert and oriented        Primary Care Physician   Suraj Case    Discharge Orders      Primary Care - Care Coordination Referral      Reason for your hospital stay    You were hospitalized for a sickle cell pain crisis and asthma exacerbation. You completed a course of steroids for the asthma.     Activity    Your activity upon discharge: activity as tolerated     Adult [facility] Follow-up and recommended labs and tests     Follow up with your hematology team as planned. Jr infusion appointment is tomorrow, 12/5.    Appointments on [facility] and/or [facility] (with [facility] or [facility] provider or service). Call [phone] if you haven't heard regarding these appointments within 7 days of discharge.     Diet    Follow this diet upon discharge: Current Diet:Orders Placed This Encounter      Regular Diet Adult       Significant Results and Procedures   Most Recent 3 CBC's:  Recent Labs   Lab Test 12/04/24  0542 12/03/24  1026 12/02/24  0859   WBC 10.5 10.7 11.9*   HGB 7.3* 7.3* 7.8*   MCV 88 86 86    408 452*     Most Recent 3 BMP's:  Recent Labs   Lab Test 12/04/24  0542 12/01/24  0559 11/30/24  1038    137 137   POTASSIUM 3.9 4.1 4.2   CHLORIDE 99 100 101   CO2 27 25 25   BUN 6.5 6.0 4.5*   CR 0.48* 0.49* 0.50*   ANIONGAP 10 12 11   MICAH 9.1 9.1 9.1   GLC 80 100* 86   ,   Results for orders placed or performed during the hospital encounter of 11/27/24   CT Chest w/o Contrast    Narrative    CT CHEST W/O CONTRAST 11/27/2024 2:42 PM    CLINICAL HISTORY: Sickle cell pain crisis, cough, R/o consolidation or  acute chest    TECHNIQUE: CT chest without IV contrast. Multiplanar reformats were  obtained. Dose reduction techniques were used.  CONTRAST: None.    COMPARISON: Chest radiograph 11/13/2024    FINDINGS:   LUNGS AND PLEURA: Lungs are clear. No pleural effusion or  pneumothorax.    MEDIASTINUM/AXILLAE: Small volume residual thymus in the anterior  mediastinum. No definite lymphadenopathy by size criteria on this  noncontrast exam. Left chest port with tip near the cavoatrial  junction. No thoracic aortic aneurysm.    CORONARY ARTERY CALCIFICATION: None.    UPPER ABDOMEN: Dense hepatic parenchyma, can be seen with iron  deposition. Atrophic spleen.    MUSCULOSKELETAL: No acute bony abnormality. Likely chronic bony  changes of sickle cell disease.      Impression    IMPRESSION:   1.  No visualized explanation for  patient's symptoms.    CHIP PABLO MD         SYSTEM ID:  [id number]   NM Lung Scan Ventilation and Perfusion    Narrative    Examination:NM LUNG SCAN VENTILATION AND PERFUSION, 11/29/2024 3:14 PM      Indication:  c/f PE (contrast allergy)     Additional Information: none    D-Dimer: None    Technique:    The patient received 2.0 mCi of Tc-99m DTPA aerosol for ventilation  and 6.5 mCi of Tc-99m MAA for perfusion. Multiple images were obtained  of the lungs in Anterior, posterior, RICHTER, RPO, LPO, and  Marshallese  projections.    Comparison: Chest CT 11/27/2024. V/Q scan 8/19/2024.    Findings:    The ventilation study demonstrates heterogenous tracer distribution  bilaterally. Small amount of tracer within the stomach.    The perfusion study demonstrates relatively homogenous tracer  distribution. No mismatched ventilation/perfusion defects identified.       Impression    Impression:    Pulmonary emboli is not present.    I have personally reviewed the examination and initial interpretation  and I agree with the findings.    ANGEL WHITE MD         SYSTEM ID:  [id number]   US Lower Extremity Venous Duplex Bilateral    Narrative    EXAM: US LOWER EXTREMITY VENOUS DUPLEX BILATERAL  LOCATION: [facility]  DATE: 11/27/2024    INDICATION: Clinical concern for pulmonary embolus; evaluate for lower extremity thrombus  COMPARISON: None.  TECHNIQUE: Venous Duplex ultrasound of bilateral lower extremities with and without compression, augmentation and duplex. Color flow and spectral Doppler with waveform analysis performed.    FINDINGS: Exam includes the common femoral, femoral, popliteal veins as well as segmentally visualized deep calf veins and greater saphenous vein.     RIGHT: No deep vein thrombosis. No superficial thrombophlebitis. No popliteal cyst.    LEFT: No deep vein thrombosis. No superficial thrombophlebitis. No popliteal cyst.      Impression    IMPRESSION:  No deep  venous thrombosis in the bilateral lower extremities.     *Note: Due to a large number of results and/or encounters for the requested time period, some results have not been displayed. A complete set of results can be found in Results Review.       Discharge Medications   Current Discharge Medication List        CONTINUE these medications which have CHANGED    Details   oxyCODONE IR (ROXICODONE) 10 MG tablet Take 1 tablet (10 mg) by mouth every 6 hours as needed for severe pain or breakthrough pain (must last 2 weeks). Goal 4 per day. Max 6 per day.  Qty: 15 tablet, Refills: 0    Associated Diagnoses: Sickle cell pain crisis (H)           CONTINUE these medications which have NOT CHANGED    Details   albuterol (PROVENTIL) (2.5 MG/3ML) 0.083% neb solution Take 2 vials (5 mg) by nebulization every 6 hours as needed for shortness of breath or wheezing.  Qty: 90 mL, Refills: 3    Associated Diagnoses: Asthmatic bronchitis without complication, unspecified asthma severity, unspecified whether persistent      aspirin (ASA) 81 MG chewable tablet Take 1 tablet (81 mg) by mouth 2 times daily  Qty: 60 tablet, Refills: 11    Associated Diagnoses: Superficial venous thrombosis of arm, right      budesonide-formoterol (SYMBICORT) 160-4.5 MCG/ACT Inhaler Inhale 2 puffs twice daily plus 1-2 puffs as needed. May use up to 12 puffs per day.  Qty: 20.4 g, Refills: 11    Associated Diagnoses: Moderate persistent asthma, unspecified whether complicated      deferasirox (JADENU) 360 MG tablet Take 4 tablets (1,440 mg) by mouth daily.  Qty: 120 tablet, Refills: 11    Associated Diagnoses: Iron overload due to repeated red blood cell transfusions      Deferiprone, Twice Daily, 1000 MG TABS Take 2,000 mg by mouth 2 times daily.  Qty: 150 tablet, Refills: 3    Associated Diagnoses: Iron overload due to repeated red blood cell transfusions      gabapentin (NEURONTIN) 300 MG capsule Take 1 capsule (300 mg) by mouth 3 times daily. Take PO 1  pill in evening (300 mg) TID to start. If tolerated, increase by 300 mg in morning or lunch every few days, updating your doctor's office in time to get refills. The maximum dose is 900 mg TID.  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 Okay to take scheduled while in pain).  Qty: 60 tablet, Refills: 1    Associated Diagnoses: Sickle cell pain crisis (H)      diphenhydrAMINE (BENADRYL) 50 MG capsule Administer 12  hours pre - IV contrast injection and 2 hours prior to contrast. Patient must have a .  Qty: 2 capsule, Refills: 0    Associated Diagnoses: Sickle cell pain crisis (H)      EPINEPHrine (ANY BX GENERIC EQUIV) 0.3 MG/0.3ML injection 2-pack Inject 0.3 mLs (0.3 mg) into the muscle as needed for anaphylaxis.  Qty: 1 each, Refills: 1    Associated Diagnoses: Anaphylaxis, sequela      methylPREDNISolone (MEDROL) 32 MG tablet Take 1 tab 12 hours before appointment and 1 tab 2 hours prior to the procedure with IV contrast.  Qty: 2 tablet, Refills: 0    Associated Diagnoses: Sickle cell pain crisis (H)      naloxone (NARCAN) 4 MG/0.1ML nasal spray Spray 4 mg into one nostril alternating nostrils as needed for opioid reversal. every 2-3 minutes until assistance arrives  Qty: 0.2 mL, Refills: 0           Allergies   Allergies   Allergen Reactions    Contrast Dye Angioedema     Hives and breathing issues    Fish-Derived Products Hives    Seafood Hives    Adhesive Tape Hives     Primipore dressing causes hives    Gadolinium     Iodinated Contrast Media

## 2024-12-05 ENCOUNTER — TELEPHONE (OUTPATIENT)
Dept: GASTROENTEROLOGY | Facility: CLINIC | Age: 24
End: 2024-12-05

## 2024-12-05 ENCOUNTER — NURSE TRIAGE (OUTPATIENT)
Dept: ONCOLOGY | Facility: CLINIC | Age: 24
End: 2024-12-05
Payer: COMMERCIAL

## 2024-12-05 ENCOUNTER — PATIENT OUTREACH (OUTPATIENT)
Dept: ONCOLOGY | Facility: CLINIC | Age: 24
End: 2024-12-05

## 2024-12-05 ENCOUNTER — APPOINTMENT (OUTPATIENT)
Dept: LAB | Facility: CLINIC | Age: 24
End: 2024-12-05
Attending: PEDIATRICS
Payer: COMMERCIAL

## 2024-12-05 ENCOUNTER — INFUSION THERAPY VISIT (OUTPATIENT)
Dept: TRANSPLANT | Facility: CLINIC | Age: 24
End: 2024-12-05
Attending: PEDIATRICS
Payer: COMMERCIAL

## 2024-12-05 VITALS
RESPIRATION RATE: 18 BRPM | TEMPERATURE: 97.4 F | DIASTOLIC BLOOD PRESSURE: 83 MMHG | SYSTOLIC BLOOD PRESSURE: 105 MMHG | OXYGEN SATURATION: 94 % | HEART RATE: 92 BPM

## 2024-12-05 DIAGNOSIS — G81.10 SPASTIC HEMIPLEGIA, UNSPECIFIED ETIOLOGY, UNSPECIFIED LATERALITY (H): ICD-10-CM

## 2024-12-05 DIAGNOSIS — D57.00 SICKLE CELL PAIN CRISIS (H): Primary | ICD-10-CM

## 2024-12-05 LAB
ANION GAP SERPL CALCULATED.3IONS-SCNC: 12 MMOL/L (ref 7–15)
BASOPHILS # BLD AUTO: 0.1 10E3/UL (ref 0–0.2)
BASOPHILS NFR BLD AUTO: 1 %
BUN SERPL-MCNC: 6.9 MG/DL (ref 6–20)
CALCIUM SERPL-MCNC: 9.4 MG/DL (ref 8.8–10.4)
CHLORIDE SERPL-SCNC: 102 MMOL/L (ref 98–107)
CREAT SERPL-MCNC: 0.53 MG/DL (ref 0.51–0.95)
EGFRCR SERPLBLD CKD-EPI 2021: >90 ML/MIN/1.73M2
EOSINOPHIL # BLD AUTO: 0.2 10E3/UL (ref 0–0.7)
EOSINOPHIL NFR BLD AUTO: 2 %
ERYTHROCYTE [DISTWIDTH] IN BLOOD BY AUTOMATED COUNT: 23.1 % (ref 10–15)
GLUCOSE SERPL-MCNC: 116 MG/DL (ref 70–99)
HCO3 SERPL-SCNC: 24 MMOL/L (ref 22–29)
HCT VFR BLD AUTO: 22.1 % (ref 35–47)
HGB BLD-MCNC: 8 G/DL (ref 11.7–15.7)
IMM GRANULOCYTES # BLD: 0.1 10E3/UL
IMM GRANULOCYTES NFR BLD: 1 %
LYMPHOCYTES # BLD AUTO: 1.5 10E3/UL (ref 0.8–5.3)
LYMPHOCYTES NFR BLD AUTO: 14 %
MCH RBC QN AUTO: 30.3 PG (ref 26.5–33)
MCHC RBC AUTO-ENTMCNC: 36.2 G/DL (ref 31.5–36.5)
MCV RBC AUTO: 84 FL (ref 78–100)
MONOCYTES # BLD AUTO: 1.2 10E3/UL (ref 0–1.3)
MONOCYTES NFR BLD AUTO: 11 %
NEUTROPHILS # BLD AUTO: 7.8 10E3/UL (ref 1.6–8.3)
NEUTROPHILS NFR BLD AUTO: 72 %
NRBC # BLD AUTO: 0.2 10E3/UL
NRBC BLD AUTO-RTO: 2 /100
PLATELET # BLD AUTO: 429 10E3/UL (ref 150–450)
POTASSIUM SERPL-SCNC: 3.9 MMOL/L (ref 3.4–5.3)
RBC # BLD AUTO: 2.64 10E6/UL (ref 3.8–5.2)
SODIUM SERPL-SCNC: 138 MMOL/L (ref 135–145)
WBC # BLD AUTO: 10.8 10E3/UL (ref 4–11)

## 2024-12-05 PROCEDURE — 85004 AUTOMATED DIFF WBC COUNT: CPT | Performed by: PEDIATRICS

## 2024-12-05 PROCEDURE — 85048 AUTOMATED LEUKOCYTE COUNT: CPT | Performed by: PEDIATRICS

## 2024-12-05 PROCEDURE — 250N000013 HC RX MED GY IP 250 OP 250 PS 637: Performed by: PEDIATRICS

## 2024-12-05 PROCEDURE — 36591 DRAW BLOOD OFF VENOUS DEVICE: CPT | Performed by: PEDIATRICS

## 2024-12-05 PROCEDURE — 80048 BASIC METABOLIC PNL TOTAL CA: CPT | Performed by: PEDIATRICS

## 2024-12-05 PROCEDURE — 258N000003 HC RX IP 258 OP 636: Performed by: PEDIATRICS

## 2024-12-05 PROCEDURE — 250N000011 HC RX IP 250 OP 636: Performed by: PEDIATRICS

## 2024-12-05 RX ORDER — HEPARIN SODIUM (PORCINE) LOCK FLUSH IV SOLN 100 UNIT/ML 100 UNIT/ML
5 SOLUTION INTRAVENOUS
OUTPATIENT
Start: 2024-12-06

## 2024-12-05 RX ORDER — MEPERIDINE HYDROCHLORIDE 25 MG/ML
25 INJECTION INTRAMUSCULAR; INTRAVENOUS; SUBCUTANEOUS
OUTPATIENT
Start: 2024-12-06

## 2024-12-05 RX ORDER — METHYLPREDNISOLONE SODIUM SUCCINATE 40 MG/ML
40 INJECTION INTRAMUSCULAR; INTRAVENOUS
Start: 2024-12-06

## 2024-12-05 RX ORDER — ONDANSETRON 2 MG/ML
8 INJECTION INTRAMUSCULAR; INTRAVENOUS EVERY 6 HOURS PRN
Status: DISCONTINUED | OUTPATIENT
Start: 2024-12-05 | End: 2024-12-05 | Stop reason: HOSPADM

## 2024-12-05 RX ORDER — DIPHENHYDRAMINE HCL 25 MG
50 CAPSULE ORAL
Status: COMPLETED | OUTPATIENT
Start: 2024-12-05 | End: 2024-12-05

## 2024-12-05 RX ORDER — EPINEPHRINE 1 MG/ML
0.3 INJECTION, SOLUTION INTRAMUSCULAR; SUBCUTANEOUS EVERY 5 MIN PRN
OUTPATIENT
Start: 2024-12-06

## 2024-12-05 RX ORDER — HEPARIN SODIUM,PORCINE 10 UNIT/ML
5 VIAL (ML) INTRAVENOUS
OUTPATIENT
Start: 2025-01-01

## 2024-12-05 RX ORDER — ONDANSETRON 2 MG/ML
8 INJECTION INTRAMUSCULAR; INTRAVENOUS EVERY 6 HOURS PRN
Start: 2025-01-01

## 2024-12-05 RX ORDER — DIPHENHYDRAMINE HYDROCHLORIDE 50 MG/ML
50 INJECTION INTRAMUSCULAR; INTRAVENOUS
Start: 2024-12-06

## 2024-12-05 RX ORDER — DIPHENHYDRAMINE HCL 25 MG
50 CAPSULE ORAL
Start: 2025-01-01

## 2024-12-05 RX ORDER — DIPHENHYDRAMINE HYDROCHLORIDE 50 MG/ML
25 INJECTION INTRAMUSCULAR; INTRAVENOUS
Start: 2024-12-06

## 2024-12-05 RX ORDER — HEPARIN SODIUM,PORCINE 10 UNIT/ML
5 VIAL (ML) INTRAVENOUS
OUTPATIENT
Start: 2024-12-06

## 2024-12-05 RX ORDER — ONDANSETRON 4 MG/1
8 TABLET, FILM COATED ORAL
Start: 2025-01-01

## 2024-12-05 RX ORDER — ONDANSETRON 4 MG/1
8 TABLET, FILM COATED ORAL
Status: DISCONTINUED | OUTPATIENT
Start: 2024-12-05 | End: 2024-12-05

## 2024-12-05 RX ORDER — HEPARIN SODIUM (PORCINE) LOCK FLUSH IV SOLN 100 UNIT/ML 100 UNIT/ML
5 SOLUTION INTRAVENOUS
OUTPATIENT
Start: 2025-01-01

## 2024-12-05 RX ORDER — ALBUTEROL SULFATE 90 UG/1
1-2 INHALANT RESPIRATORY (INHALATION)
Start: 2024-12-06

## 2024-12-05 RX ORDER — ALBUTEROL SULFATE 0.83 MG/ML
2.5 SOLUTION RESPIRATORY (INHALATION)
OUTPATIENT
Start: 2024-12-06

## 2024-12-05 RX ORDER — KETOROLAC TROMETHAMINE 30 MG/ML
30 INJECTION, SOLUTION INTRAMUSCULAR; INTRAVENOUS ONCE
Start: 2025-01-01 | End: 2025-01-01

## 2024-12-05 RX ORDER — KETOROLAC TROMETHAMINE 30 MG/ML
30 INJECTION, SOLUTION INTRAMUSCULAR; INTRAVENOUS ONCE
Status: COMPLETED | OUTPATIENT
Start: 2024-12-05 | End: 2024-12-05

## 2024-12-05 RX ADMIN — KETOROLAC TROMETHAMINE 30 MG: 30 INJECTION, SOLUTION INTRAMUSCULAR; INTRAVENOUS at 10:06

## 2024-12-05 RX ADMIN — SODIUM CHLORIDE, SODIUM LACTATE, POTASSIUM CHLORIDE, CALCIUM CHLORIDE 1000 ML: 600; 310; 30; 20 INJECTION, SOLUTION INTRAVENOUS at 10:07

## 2024-12-05 RX ADMIN — SODIUM CHLORIDE 354 MG: 9 INJECTION, SOLUTION INTRAVENOUS at 11:07

## 2024-12-05 RX ADMIN — HYDROMORPHONE HYDROCHLORIDE 1 MG: 1 INJECTION, SOLUTION INTRAMUSCULAR; INTRAVENOUS; SUBCUTANEOUS at 12:12

## 2024-12-05 RX ADMIN — HYDROMORPHONE HYDROCHLORIDE 1 MG: 1 INJECTION, SOLUTION INTRAMUSCULAR; INTRAVENOUS; SUBCUTANEOUS at 11:07

## 2024-12-05 RX ADMIN — ONDANSETRON 8 MG: 2 INJECTION INTRAMUSCULAR; INTRAVENOUS at 10:06

## 2024-12-05 RX ADMIN — DIPHENHYDRAMINE HYDROCHLORIDE 50 MG: 25 CAPSULE ORAL at 10:06

## 2024-12-05 RX ADMIN — HYDROMORPHONE HYDROCHLORIDE 1 MG: 1 INJECTION, SOLUTION INTRAMUSCULAR; INTRAVENOUS; SUBCUTANEOUS at 10:02

## 2024-12-05 ASSESSMENT — PAIN SCALES - GENERAL: PAINLEVEL_OUTOF10: EXTREME PAIN (8)

## 2024-12-05 NOTE — PROGRESS NOTES
Infusion Nursing Note:  Jennifer Cervantes presents today for scheduled infusion.    Patient seen by provider today: No   present during visit today: Not Applicable.    Note: Patient received sidney infusion per treatment plan. Patient monitored post infusion; no reaction noted. Patient also received dilaudid x3, Toradol, benadryl, 1 L LR bolus, and Zofran per treatment plan for generalized pain with some relief.      Intravenous Access:  Implanted Port.    Treatment Conditions:  Results reviewed, labs MET treatment parameters, ok to proceed with treatment.      Post Infusion Assessment:  Patient tolerated infusion without incident.       Discharge Plan:   Patient and/or family verbalized understanding of discharge instructions and all questions answered.      Vicenta Boone RN

## 2024-12-05 NOTE — PROGRESS NOTES
[facility]: Cancer Care                                                                                          Background: Transitional Care Management program identified per system criteria and reviewed by RN Care Coordinator for possible outreach.     Assessment: Upon chart review, RNCC Team member will not proceed with patient outreach related to this episode of Transitional Care Management program due to reason below:     Patient has a follow up appointment with an appropriate provider 12/19/24 for hospital discharge and sidney on 12/5/24.     Plan: Transitional Care Management episode addressed appropriately per reason noted above.           Signature:  Arely Krueger RN

## 2024-12-05 NOTE — TELEPHONE ENCOUNTER
Oncology Nurse Triage - Sickle Cell Pain Crisis:    Situation: Jennifer  calling about Sickle Cell Pain Crisis, requesting to add on IV fluids and pain medicine to Jr infusion today.     Background:     Patient's last infusion was 12/04/24  Last clinic visit date:11/25/24 w/Patricia Mantilla  Does patient have active treatment plan? Yes    Assessment of Symptoms:  Onset/Duration of symptoms: 2 day    Is it typical sickle cell pain? Yes  Location: left side of body  Character: Sharp  Intensity: 8/10    Any radiation of pain, numbness, tingling, weakness, warmth, swelling, discoloration of arms or legs?No    Fever? No  (if yes max temperature recorded in last 24 hours):      Chest Pain Present: No    Shortness of breath: No    Other home therapies tried: HEAT/HEATING PAD and WARM BATH     Last home medication taken and when: 4pm oxycodone    Any Refills Needed?: No    Does patient have transportation & length of time to get to clinic: Yes      Recommendations:   0718 Vocera page to     0721 Approved by     0732 BMT infusion charge stating okay to move Jennifer from [facility] to BMT at 1000 for Jr and IVF/pain meds.     0741 Pt notified and confirmed for 1000 appt.

## 2024-12-05 NOTE — TELEPHONE ENCOUNTER
Message sent to anesthesia and scoping provider re: pt's 11/27 hospital admission for Sickle cell pain crisis and Acute hypoxic respiratory failure and Asthma with acute exacerbation. Okay to proceed with procedure?   Zaynab Gresham RN

## 2024-12-05 NOTE — NURSING NOTE
Chief Complaint   Patient presents with    Port Draw     Labs drawn via port by RN in lab. VS taken.      Labs drawn via Port. Line flushed and saline locked. Vital signs taken. Infusion RN notified of . Checked into next appointment.     Jennifer Lai RN

## 2024-12-06 ENCOUNTER — HOSPITAL ENCOUNTER (INPATIENT)
Facility: CLINIC | Age: 24
LOS: 3 days | Discharge: HOME | DRG: 760 | End: 2024-12-10
Attending: STUDENT IN AN ORGANIZED HEALTH CARE EDUCATION/TRAINING PROGRAM | Admitting: PEDIATRICS
Payer: COMMERCIAL

## 2024-12-06 ENCOUNTER — APPOINTMENT (OUTPATIENT)
Dept: ULTRASOUND IMAGING | Facility: CLINIC | Age: 24
DRG: 760 | End: 2024-12-06
Attending: STUDENT IN AN ORGANIZED HEALTH CARE EDUCATION/TRAINING PROGRAM
Payer: COMMERCIAL

## 2024-12-06 DIAGNOSIS — D57.00 SICKLE CELL PAIN CRISIS (H): Primary | ICD-10-CM

## 2024-12-06 DIAGNOSIS — D64.9 ANEMIA, UNSPECIFIED TYPE: ICD-10-CM

## 2024-12-06 DIAGNOSIS — D57.00 SICKLE CELL DISEASE WITH CRISIS (H): ICD-10-CM

## 2024-12-06 DIAGNOSIS — Z97.5 NEXPLANON IN PLACE: ICD-10-CM

## 2024-12-06 DIAGNOSIS — N93.9 VAGINAL BLEEDING: ICD-10-CM

## 2024-12-06 LAB
ALBUMIN SERPL BCG-MCNC: 4.2 G/DL (ref 3.5–5.2)
ALBUMIN UR-MCNC: NEGATIVE MG/DL
ALP SERPL-CCNC: 57 U/L (ref 40–150)
ALT SERPL W P-5'-P-CCNC: 86 U/L (ref 0–50)
ANION GAP SERPL CALCULATED.3IONS-SCNC: 10 MMOL/L (ref 7–15)
APPEARANCE UR: CLEAR
AST SERPL W P-5'-P-CCNC: 78 U/L (ref 0–45)
BASOPHILS # BLD AUTO: 0.2 10E3/UL (ref 0–0.2)
BASOPHILS NFR BLD AUTO: 2 %
BILIRUB SERPL-MCNC: 2.8 MG/DL
BILIRUB UR QL STRIP: NEGATIVE
BUN SERPL-MCNC: 6 MG/DL (ref 6–20)
CALCIUM SERPL-MCNC: 8.7 MG/DL (ref 8.8–10.4)
CHLORIDE SERPL-SCNC: 105 MMOL/L (ref 98–107)
COLOR UR AUTO: YELLOW
CREAT SERPL-MCNC: 0.71 MG/DL (ref 0.51–0.95)
EGFRCR SERPLBLD CKD-EPI 2021: >90 ML/MIN/1.73M2
EOSINOPHIL # BLD AUTO: 0.3 10E3/UL (ref 0–0.7)
EOSINOPHIL NFR BLD AUTO: 3 %
ERYTHROCYTE [DISTWIDTH] IN BLOOD BY AUTOMATED COUNT: 22.5 % (ref 10–15)
GLUCOSE SERPL-MCNC: 97 MG/DL (ref 70–99)
GLUCOSE UR STRIP-MCNC: NEGATIVE MG/DL
HCG SERPL QL: NEGATIVE
HCO3 SERPL-SCNC: 25 MMOL/L (ref 22–29)
HCT VFR BLD AUTO: 19.3 % (ref 35–47)
HGB BLD-MCNC: 6.9 G/DL (ref 11.7–15.7)
HGB UR QL STRIP: NEGATIVE
IMM GRANULOCYTES # BLD: 0.1 10E3/UL
IMM GRANULOCYTES NFR BLD: 0 %
KETONES UR STRIP-MCNC: NEGATIVE MG/DL
LEUKOCYTE ESTERASE UR QL STRIP: NEGATIVE
LYMPHOCYTES # BLD AUTO: 2.3 10E3/UL (ref 0.8–5.3)
LYMPHOCYTES NFR BLD AUTO: 21 %
MCH RBC QN AUTO: 30.7 PG (ref 26.5–33)
MCHC RBC AUTO-ENTMCNC: 35.8 G/DL (ref 31.5–36.5)
MCV RBC AUTO: 86 FL (ref 78–100)
MONOCYTES # BLD AUTO: 1.2 10E3/UL (ref 0–1.3)
MONOCYTES NFR BLD AUTO: 11 %
NEUTROPHILS # BLD AUTO: 7.1 10E3/UL (ref 1.6–8.3)
NEUTROPHILS NFR BLD AUTO: 64 %
NITRATE UR QL: NEGATIVE
NRBC # BLD AUTO: 0.2 10E3/UL
NRBC BLD AUTO-RTO: 2 /100
PH UR STRIP: 6.5 [PH] (ref 5–7)
PLATELET # BLD AUTO: 404 10E3/UL (ref 150–450)
POTASSIUM SERPL-SCNC: 3.9 MMOL/L (ref 3.4–5.3)
PROT SERPL-MCNC: 7 G/DL (ref 6.4–8.3)
RBC # BLD AUTO: 2.25 10E6/UL (ref 3.8–5.2)
RBC URINE: 1 /HPF
RETICS # AUTO: 0.41 10E6/UL (ref 0.03–0.1)
RETICS/RBC NFR AUTO: 18.4 % (ref 0.5–2)
SODIUM SERPL-SCNC: 140 MMOL/L (ref 135–145)
SP GR UR STRIP: 1.01 (ref 1–1.03)
SQUAMOUS EPITHELIAL: <1 /HPF
UROBILINOGEN UR STRIP-MCNC: >12 MG/DL
WBC # BLD AUTO: 11.2 10E3/UL (ref 4–11)
WBC URINE: 1 /HPF

## 2024-12-06 PROCEDURE — 250N000011 HC RX IP 250 OP 636: Performed by: STUDENT IN AN ORGANIZED HEALTH CARE EDUCATION/TRAINING PROGRAM

## 2024-12-06 PROCEDURE — 84703 CHORIONIC GONADOTROPIN ASSAY: CPT | Performed by: STUDENT IN AN ORGANIZED HEALTH CARE EDUCATION/TRAINING PROGRAM

## 2024-12-06 PROCEDURE — 76856 US EXAM PELVIC COMPLETE: CPT

## 2024-12-06 PROCEDURE — 82728 ASSAY OF FERRITIN: CPT | Performed by: STUDENT IN AN ORGANIZED HEALTH CARE EDUCATION/TRAINING PROGRAM

## 2024-12-06 PROCEDURE — 82040 ASSAY OF SERUM ALBUMIN: CPT | Performed by: STUDENT IN AN ORGANIZED HEALTH CARE EDUCATION/TRAINING PROGRAM

## 2024-12-06 PROCEDURE — 258N000003 HC RX IP 258 OP 636: Performed by: STUDENT IN AN ORGANIZED HEALTH CARE EDUCATION/TRAINING PROGRAM

## 2024-12-06 PROCEDURE — 83615 LACTATE (LD) (LDH) ENZYME: CPT | Performed by: STUDENT IN AN ORGANIZED HEALTH CARE EDUCATION/TRAINING PROGRAM

## 2024-12-06 PROCEDURE — 85025 COMPLETE CBC W/AUTO DIFF WBC: CPT | Performed by: STUDENT IN AN ORGANIZED HEALTH CARE EDUCATION/TRAINING PROGRAM

## 2024-12-06 PROCEDURE — 96361 HYDRATE IV INFUSION ADD-ON: CPT | Performed by: STUDENT IN AN ORGANIZED HEALTH CARE EDUCATION/TRAINING PROGRAM

## 2024-12-06 PROCEDURE — 85045 AUTOMATED RETICULOCYTE COUNT: CPT | Performed by: STUDENT IN AN ORGANIZED HEALTH CARE EDUCATION/TRAINING PROGRAM

## 2024-12-06 PROCEDURE — 76830 TRANSVAGINAL US NON-OB: CPT

## 2024-12-06 PROCEDURE — 96376 TX/PRO/DX INJ SAME DRUG ADON: CPT | Performed by: STUDENT IN AN ORGANIZED HEALTH CARE EDUCATION/TRAINING PROGRAM

## 2024-12-06 PROCEDURE — 96374 THER/PROPH/DIAG INJ IV PUSH: CPT

## 2024-12-06 PROCEDURE — 99285 EMERGENCY DEPT VISIT HI MDM: CPT | Mod: 25 | Performed by: STUDENT IN AN ORGANIZED HEALTH CARE EDUCATION/TRAINING PROGRAM

## 2024-12-06 PROCEDURE — 83010 ASSAY OF HAPTOGLOBIN QUANT: CPT | Performed by: STUDENT IN AN ORGANIZED HEALTH CARE EDUCATION/TRAINING PROGRAM

## 2024-12-06 PROCEDURE — 36415 COLL VENOUS BLD VENIPUNCTURE: CPT | Performed by: STUDENT IN AN ORGANIZED HEALTH CARE EDUCATION/TRAINING PROGRAM

## 2024-12-06 PROCEDURE — 96375 TX/PRO/DX INJ NEW DRUG ADDON: CPT | Performed by: STUDENT IN AN ORGANIZED HEALTH CARE EDUCATION/TRAINING PROGRAM

## 2024-12-06 PROCEDURE — 83550 IRON BINDING TEST: CPT | Performed by: STUDENT IN AN ORGANIZED HEALTH CARE EDUCATION/TRAINING PROGRAM

## 2024-12-06 PROCEDURE — 81003 URINALYSIS AUTO W/O SCOPE: CPT | Performed by: STUDENT IN AN ORGANIZED HEALTH CARE EDUCATION/TRAINING PROGRAM

## 2024-12-06 PROCEDURE — 99285 EMERGENCY DEPT VISIT HI MDM: CPT | Performed by: STUDENT IN AN ORGANIZED HEALTH CARE EDUCATION/TRAINING PROGRAM

## 2024-12-06 RX ORDER — ONDANSETRON 2 MG/ML
8 INJECTION INTRAMUSCULAR; INTRAVENOUS ONCE
Status: COMPLETED | OUTPATIENT
Start: 2024-12-06 | End: 2024-12-06

## 2024-12-06 RX ORDER — KETOROLAC TROMETHAMINE 30 MG/ML
30 INJECTION, SOLUTION INTRAMUSCULAR; INTRAVENOUS ONCE
Status: COMPLETED | OUTPATIENT
Start: 2024-12-06 | End: 2024-12-06

## 2024-12-06 RX ADMIN — ONDANSETRON 8 MG: 2 INJECTION INTRAMUSCULAR; INTRAVENOUS at 21:35

## 2024-12-06 RX ADMIN — HYDROMORPHONE HYDROCHLORIDE 1 MG: 1 INJECTION, SOLUTION INTRAMUSCULAR; INTRAVENOUS; SUBCUTANEOUS at 23:40

## 2024-12-06 RX ADMIN — KETOROLAC TROMETHAMINE 30 MG: 30 INJECTION, SOLUTION INTRAMUSCULAR at 21:39

## 2024-12-06 RX ADMIN — HYDROMORPHONE HYDROCHLORIDE 1 MG: 1 INJECTION, SOLUTION INTRAMUSCULAR; INTRAVENOUS; SUBCUTANEOUS at 21:41

## 2024-12-06 RX ADMIN — SODIUM CHLORIDE, POTASSIUM CHLORIDE, SODIUM LACTATE AND CALCIUM CHLORIDE 1000 ML: 600; 310; 30; 20 INJECTION, SOLUTION INTRAVENOUS at 21:33

## 2024-12-06 ASSESSMENT — ACTIVITIES OF DAILY LIVING (ADL)
ADLS_ACUITY_SCORE: 58

## 2024-12-07 ENCOUNTER — APPOINTMENT (OUTPATIENT)
Dept: ULTRASOUND IMAGING | Facility: CLINIC | Age: 24
DRG: 760 | End: 2024-12-07
Attending: STUDENT IN AN ORGANIZED HEALTH CARE EDUCATION/TRAINING PROGRAM
Payer: COMMERCIAL

## 2024-12-07 PROBLEM — N93.9 VAGINAL BLEEDING: Status: ACTIVE | Noted: 2024-12-07

## 2024-12-07 PROBLEM — D64.9 ANEMIA, UNSPECIFIED TYPE: Status: ACTIVE | Noted: 2024-12-07

## 2024-12-07 LAB
ABO + RH BLD: NORMAL
ALBUMIN SERPL BCG-MCNC: 4 G/DL (ref 3.5–5.2)
ALP SERPL-CCNC: 52 U/L (ref 40–150)
ALT SERPL W P-5'-P-CCNC: 75 U/L (ref 0–50)
ANION GAP SERPL CALCULATED.3IONS-SCNC: 8 MMOL/L (ref 7–15)
AST SERPL W P-5'-P-CCNC: 65 U/L (ref 0–45)
BASOPHILS # BLD AUTO: 0.2 10E3/UL (ref 0–0.2)
BASOPHILS NFR BLD AUTO: 2 %
BILIRUB SERPL-MCNC: 2.8 MG/DL
BLD GP AB SCN SERPL QL: NEGATIVE
BUN SERPL-MCNC: 7 MG/DL (ref 6–20)
CALCIUM SERPL-MCNC: 8.4 MG/DL (ref 8.8–10.4)
CHLORIDE SERPL-SCNC: 109 MMOL/L (ref 98–107)
CREAT SERPL-MCNC: 0.63 MG/DL (ref 0.51–0.95)
EGFRCR SERPLBLD CKD-EPI 2021: >90 ML/MIN/1.73M2
EOSINOPHIL # BLD AUTO: 0.6 10E3/UL (ref 0–0.7)
EOSINOPHIL NFR BLD AUTO: 6 %
ERYTHROCYTE [DISTWIDTH] IN BLOOD BY AUTOMATED COUNT: 22.3 % (ref 10–15)
FERRITIN SERPL-MCNC: 5942 NG/ML (ref 6–175)
FRAGMENTS BLD QL SMEAR: SLIGHT
GLUCOSE SERPL-MCNC: 93 MG/DL (ref 70–99)
HAPTOGLOB SERPL-MCNC: <10 MG/DL (ref 30–200)
HCO3 SERPL-SCNC: 25 MMOL/L (ref 22–29)
HCT VFR BLD AUTO: 18.2 % (ref 35–47)
HCT VFR BLD AUTO: 18.3 % (ref 35–47)
HGB BLD-MCNC: 6.4 G/DL (ref 11.7–15.7)
HGB BLD-MCNC: 6.5 G/DL (ref 11.7–15.7)
IMM GRANULOCYTES # BLD: 0.1 10E3/UL
IMM GRANULOCYTES NFR BLD: 1 %
IRON BINDING CAPACITY (ROCHE): ABNORMAL
IRON SATN MFR SERPL: ABNORMAL %
IRON SERPL-MCNC: 160 UG/DL (ref 37–145)
LDH SERPL L TO P-CCNC: 428 U/L (ref 0–250)
LYMPHOCYTES # BLD AUTO: 3.3 10E3/UL (ref 0.8–5.3)
LYMPHOCYTES NFR BLD AUTO: 30 %
MCH RBC QN AUTO: 30.5 PG (ref 26.5–33)
MCHC RBC AUTO-ENTMCNC: 35.2 G/DL (ref 31.5–36.5)
MCV RBC AUTO: 87 FL (ref 78–100)
MONOCYTES # BLD AUTO: 1.1 10E3/UL (ref 0–1.3)
MONOCYTES NFR BLD AUTO: 10 %
NEUTROPHILS # BLD AUTO: 5.9 10E3/UL (ref 1.6–8.3)
NEUTROPHILS NFR BLD AUTO: 53 %
NRBC # BLD AUTO: 0.2 10E3/UL
NRBC BLD AUTO-RTO: 2 /100
PLAT MORPH BLD: ABNORMAL
PLATELET # BLD AUTO: 417 10E3/UL (ref 150–450)
POLYCHROMASIA BLD QL SMEAR: SLIGHT
POTASSIUM SERPL-SCNC: 3.9 MMOL/L (ref 3.4–5.3)
PROT SERPL-MCNC: 6.5 G/DL (ref 6.4–8.3)
RBC # BLD AUTO: 2.1 10E6/UL (ref 3.8–5.2)
RBC MORPH BLD: ABNORMAL
SICKLE CELLS BLD QL SMEAR: ABNORMAL
SODIUM SERPL-SCNC: 142 MMOL/L (ref 135–145)
SPECIMEN EXP DATE BLD: NORMAL
TARGETS BLD QL SMEAR: SLIGHT
WBC # BLD AUTO: 11.2 10E3/UL (ref 4–11)

## 2024-12-07 PROCEDURE — 96361 HYDRATE IV INFUSION ADD-ON: CPT | Performed by: STUDENT IN AN ORGANIZED HEALTH CARE EDUCATION/TRAINING PROGRAM

## 2024-12-07 PROCEDURE — 99418 PROLNG IP/OBS E/M EA 15 MIN: CPT | Performed by: PEDIATRICS

## 2024-12-07 PROCEDURE — 250N000011 HC RX IP 250 OP 636: Performed by: PEDIATRICS

## 2024-12-07 PROCEDURE — 250N000013 HC RX MED GY IP 250 OP 250 PS 637: Performed by: INTERNAL MEDICINE

## 2024-12-07 PROCEDURE — 99222 1ST HOSP IP/OBS MODERATE 55: CPT | Mod: GC | Performed by: INTERNAL MEDICINE

## 2024-12-07 PROCEDURE — 99223 1ST HOSP IP/OBS HIGH 75: CPT | Performed by: PEDIATRICS

## 2024-12-07 PROCEDURE — 85025 COMPLETE CBC W/AUTO DIFF WBC: CPT | Performed by: STUDENT IN AN ORGANIZED HEALTH CARE EDUCATION/TRAINING PROGRAM

## 2024-12-07 PROCEDURE — 85004 AUTOMATED DIFF WBC COUNT: CPT | Performed by: PEDIATRICS

## 2024-12-07 PROCEDURE — 82310 ASSAY OF CALCIUM: CPT | Performed by: PEDIATRICS

## 2024-12-07 PROCEDURE — G0378 HOSPITAL OBSERVATION PER HR: HCPCS

## 2024-12-07 PROCEDURE — 36415 COLL VENOUS BLD VENIPUNCTURE: CPT | Performed by: PEDIATRICS

## 2024-12-07 PROCEDURE — 250N000011 HC RX IP 250 OP 636: Performed by: STUDENT IN AN ORGANIZED HEALTH CARE EDUCATION/TRAINING PROGRAM

## 2024-12-07 PROCEDURE — 99207 PR NO CHARGE LOS: CPT | Performed by: INTERNAL MEDICINE

## 2024-12-07 PROCEDURE — 76770 US EXAM ABDO BACK WALL COMP: CPT

## 2024-12-07 PROCEDURE — 36415 COLL VENOUS BLD VENIPUNCTURE: CPT | Performed by: STUDENT IN AN ORGANIZED HEALTH CARE EDUCATION/TRAINING PROGRAM

## 2024-12-07 PROCEDURE — 250N000011 HC RX IP 250 OP 636: Performed by: INTERNAL MEDICINE

## 2024-12-07 PROCEDURE — 96376 TX/PRO/DX INJ SAME DRUG ADON: CPT | Performed by: STUDENT IN AN ORGANIZED HEALTH CARE EDUCATION/TRAINING PROGRAM

## 2024-12-07 PROCEDURE — 96376 TX/PRO/DX INJ SAME DRUG ADON: CPT

## 2024-12-07 PROCEDURE — 99222 1ST HOSP IP/OBS MODERATE 55: CPT | Mod: GC | Performed by: STUDENT IN AN ORGANIZED HEALTH CARE EDUCATION/TRAINING PROGRAM

## 2024-12-07 PROCEDURE — 85041 AUTOMATED RBC COUNT: CPT | Performed by: PEDIATRICS

## 2024-12-07 PROCEDURE — 86900 BLOOD TYPING SEROLOGIC ABO: CPT | Performed by: STUDENT IN AN ORGANIZED HEALTH CARE EDUCATION/TRAINING PROGRAM

## 2024-12-07 PROCEDURE — 84132 ASSAY OF SERUM POTASSIUM: CPT | Performed by: PEDIATRICS

## 2024-12-07 PROCEDURE — 120N000002 HC R&B MED SURG/OB UMMC

## 2024-12-07 PROCEDURE — 250N000013 HC RX MED GY IP 250 OP 250 PS 637: Performed by: PEDIATRICS

## 2024-12-07 RX ORDER — PROCHLORPERAZINE MALEATE 5 MG/1
10 TABLET ORAL EVERY 6 HOURS PRN
Status: DISCONTINUED | OUTPATIENT
Start: 2024-12-07 | End: 2024-12-10 | Stop reason: HOSPADM

## 2024-12-07 RX ORDER — ONDANSETRON 4 MG/1
4 TABLET, ORALLY DISINTEGRATING ORAL EVERY 6 HOURS PRN
Status: DISCONTINUED | OUTPATIENT
Start: 2024-12-07 | End: 2024-12-10 | Stop reason: HOSPADM

## 2024-12-07 RX ORDER — NALOXONE HYDROCHLORIDE 0.4 MG/ML
0.4 INJECTION, SOLUTION INTRAMUSCULAR; INTRAVENOUS; SUBCUTANEOUS
Status: DISCONTINUED | OUTPATIENT
Start: 2024-12-07 | End: 2024-12-10 | Stop reason: HOSPADM

## 2024-12-07 RX ORDER — AMOXICILLIN 250 MG
1 CAPSULE ORAL 2 TIMES DAILY PRN
Status: DISCONTINUED | OUTPATIENT
Start: 2024-12-07 | End: 2024-12-10 | Stop reason: HOSPADM

## 2024-12-07 RX ORDER — AMOXICILLIN 250 MG
2 CAPSULE ORAL 2 TIMES DAILY
Status: DISCONTINUED | OUTPATIENT
Start: 2024-12-07 | End: 2024-12-10 | Stop reason: HOSPADM

## 2024-12-07 RX ORDER — POLYETHYLENE GLYCOL 3350 17 G/17G
17 POWDER, FOR SOLUTION ORAL 2 TIMES DAILY PRN
Status: DISCONTINUED | OUTPATIENT
Start: 2024-12-07 | End: 2024-12-10 | Stop reason: HOSPADM

## 2024-12-07 RX ORDER — ALBUTEROL SULFATE 0.83 MG/ML
5 SOLUTION RESPIRATORY (INHALATION) EVERY 6 HOURS PRN
Status: DISCONTINUED | OUTPATIENT
Start: 2024-12-07 | End: 2024-12-10 | Stop reason: HOSPADM

## 2024-12-07 RX ORDER — HYDROXYUREA 500 MG/1
3000 CAPSULE ORAL DAILY
Status: DISCONTINUED | OUTPATIENT
Start: 2024-12-07 | End: 2024-12-10 | Stop reason: HOSPADM

## 2024-12-07 RX ORDER — DEFERASIROX 360 MG/1
1440 TABLET, FILM COATED ORAL DAILY
Status: DISCONTINUED | OUTPATIENT
Start: 2024-12-07 | End: 2024-12-10 | Stop reason: HOSPADM

## 2024-12-07 RX ORDER — NALOXONE HYDROCHLORIDE 0.4 MG/ML
0.2 INJECTION, SOLUTION INTRAMUSCULAR; INTRAVENOUS; SUBCUTANEOUS
Status: DISCONTINUED | OUTPATIENT
Start: 2024-12-07 | End: 2024-12-10 | Stop reason: HOSPADM

## 2024-12-07 RX ORDER — ONDANSETRON 2 MG/ML
4 INJECTION INTRAMUSCULAR; INTRAVENOUS EVERY 6 HOURS PRN
Status: DISCONTINUED | OUTPATIENT
Start: 2024-12-07 | End: 2024-12-10 | Stop reason: HOSPADM

## 2024-12-07 RX ORDER — AMOXICILLIN 250 MG
2 CAPSULE ORAL 2 TIMES DAILY PRN
Status: DISCONTINUED | OUTPATIENT
Start: 2024-12-07 | End: 2024-12-10 | Stop reason: HOSPADM

## 2024-12-07 RX ORDER — OXYCODONE HYDROCHLORIDE 10 MG/1
10 TABLET ORAL EVERY 6 HOURS PRN
Status: DISCONTINUED | OUTPATIENT
Start: 2024-12-07 | End: 2024-12-10 | Stop reason: HOSPADM

## 2024-12-07 RX ORDER — ASPIRIN 81 MG/1
81 TABLET, CHEWABLE ORAL 2 TIMES DAILY
Status: DISCONTINUED | OUTPATIENT
Start: 2024-12-07 | End: 2024-12-10 | Stop reason: HOSPADM

## 2024-12-07 RX ORDER — METHOCARBAMOL 750 MG/1
750 TABLET, FILM COATED ORAL 4 TIMES DAILY PRN
Status: DISCONTINUED | OUTPATIENT
Start: 2024-12-07 | End: 2024-12-10 | Stop reason: HOSPADM

## 2024-12-07 RX ORDER — AMOXICILLIN 250 MG
1 CAPSULE ORAL 2 TIMES DAILY
Status: DISCONTINUED | OUTPATIENT
Start: 2024-12-07 | End: 2024-12-10 | Stop reason: HOSPADM

## 2024-12-07 RX ORDER — GABAPENTIN 300 MG/1
600 CAPSULE ORAL AT BEDTIME
Status: DISCONTINUED | OUTPATIENT
Start: 2024-12-07 | End: 2024-12-10 | Stop reason: HOSPADM

## 2024-12-07 RX ADMIN — HYDROMORPHONE HYDROCHLORIDE 1 MG: 1 INJECTION, SOLUTION INTRAMUSCULAR; INTRAVENOUS; SUBCUTANEOUS at 22:39

## 2024-12-07 RX ADMIN — HYDROMORPHONE HYDROCHLORIDE 1 MG: 1 INJECTION, SOLUTION INTRAMUSCULAR; INTRAVENOUS; SUBCUTANEOUS at 01:08

## 2024-12-07 RX ADMIN — HYDROXYUREA 3000 MG: 500 CAPSULE ORAL at 07:48

## 2024-12-07 RX ADMIN — ASPIRIN 81 MG CHEWABLE TABLET 81 MG: 81 TABLET CHEWABLE at 20:29

## 2024-12-07 RX ADMIN — HYDROMORPHONE HYDROCHLORIDE 1 MG: 1 INJECTION, SOLUTION INTRAMUSCULAR; INTRAVENOUS; SUBCUTANEOUS at 07:08

## 2024-12-07 RX ADMIN — DEFERASIROX 1440 MG: 360 TABLET, FILM COATED ORAL at 07:48

## 2024-12-07 RX ADMIN — GABAPENTIN 600 MG: 300 CAPSULE ORAL at 22:39

## 2024-12-07 RX ADMIN — HYDROMORPHONE HYDROCHLORIDE 1 MG: 1 INJECTION, SOLUTION INTRAMUSCULAR; INTRAVENOUS; SUBCUTANEOUS at 09:22

## 2024-12-07 RX ADMIN — HYDROMORPHONE HYDROCHLORIDE 1 MG: 1 INJECTION, SOLUTION INTRAMUSCULAR; INTRAVENOUS; SUBCUTANEOUS at 18:17

## 2024-12-07 RX ADMIN — HYDROMORPHONE HYDROCHLORIDE 1 MG: 1 INJECTION, SOLUTION INTRAMUSCULAR; INTRAVENOUS; SUBCUTANEOUS at 04:33

## 2024-12-07 RX ADMIN — HYDROMORPHONE HYDROCHLORIDE 1 MG: 1 INJECTION, SOLUTION INTRAMUSCULAR; INTRAVENOUS; SUBCUTANEOUS at 20:29

## 2024-12-07 RX ADMIN — HYDROMORPHONE HYDROCHLORIDE 1 MG: 1 INJECTION, SOLUTION INTRAMUSCULAR; INTRAVENOUS; SUBCUTANEOUS at 02:29

## 2024-12-07 RX ADMIN — HYDROMORPHONE HYDROCHLORIDE 1 MG: 1 INJECTION, SOLUTION INTRAMUSCULAR; INTRAVENOUS; SUBCUTANEOUS at 13:15

## 2024-12-07 RX ADMIN — HYDROMORPHONE HYDROCHLORIDE 1 MG: 1 INJECTION, SOLUTION INTRAMUSCULAR; INTRAVENOUS; SUBCUTANEOUS at 15:32

## 2024-12-07 ASSESSMENT — ACTIVITIES OF DAILY LIVING (ADL)
ADLS_ACUITY_SCORE: 37
ADLS_ACUITY_SCORE: 58
ADLS_ACUITY_SCORE: 58
VISION_MANAGEMENT: WEARS GLASSES
HEARING_DIFFICULTY_OR_DEAF: NO
ADLS_ACUITY_SCORE: 35
CONCENTRATING,_REMEMBERING_OR_MAKING_DECISIONS_DIFFICULTY: NO
ADLS_ACUITY_SCORE: 35
ADLS_ACUITY_SCORE: 58
ADLS_ACUITY_SCORE: 58
DIFFICULTY_EATING/SWALLOWING: NO
ADLS_ACUITY_SCORE: 35
ADLS_ACUITY_SCORE: 58
DIFFICULTY_COMMUNICATING: NO
ADLS_ACUITY_SCORE: 58
TOILETING_ISSUES: NO
ADLS_ACUITY_SCORE: 37
ADLS_ACUITY_SCORE: 58
FALL_HISTORY_WITHIN_LAST_SIX_MONTHS: NO
ADLS_ACUITY_SCORE: 35
DRESSING/BATHING_DIFFICULTY: NO
ADLS_ACUITY_SCORE: 58
ADLS_ACUITY_SCORE: 58
WALKING_OR_CLIMBING_STAIRS_DIFFICULTY: NO
CHANGE_IN_FUNCTIONAL_STATUS_SINCE_ONSET_OF_CURRENT_ILLNESS/INJURY: NO
ADLS_ACUITY_SCORE: 35
ADLS_ACUITY_SCORE: 58
ADLS_ACUITY_SCORE: 35
DOING_ERRANDS_INDEPENDENTLY_DIFFICULTY: NO
ADLS_ACUITY_SCORE: 58
ADLS_ACUITY_SCORE: 35
WEAR_GLASSES_OR_BLIND: YES

## 2024-12-07 NOTE — ED PROVIDER NOTES
[facility] EMERGENCY DEPARTMENT ([facility])    12/06/24      ED PROVIDER NOTE    History     Chief Complaint   Patient presents with    Sickle Cell Pain Crisis    Abdominal Pain     The history is provided by the patient and medical records.     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  She is unsure if this is blood from urination or vaginal bleeding.  She states she has a Nexplanon in place that has been there for maybe 1 to 2 years but is not entirely certain of the specifics.  She states she does not normally get periods.  She is not having chest pain.  Denies shortness of breath.  Was at the infusion center earlier today and noticed the bleeding afterwards.    Patient was recently admitted here at [facility] from 11/27/2024 to 12/4/2024 for sickle cell pain crisis and asthma exacerbation.  Pain improved with IV Dilaudid.  Patient initially required supplemental O2, symptoms improved with 5-day prednisone course and patient was able to be weaned off of O2.            Acute Pain Crisis Treatment: (limiting IV dosing)  ER   Dilaudid 1 mg IV q1h up to 3 doses  Toradol 30 mg IV x1   Maintenance IV fluids with LR  Other: Zofran 8 mg IV PRN nausea    Past Medical History  Past Medical History:   Diagnosis Date    Anxiety     Bleeding disorder (H)     Blood clotting disorder (H)     Cerebral infarction (H) 2015    Cognitive developmental delay     low IQ. 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 side (H)     right hand  contractures    Iron overload due to repeated red blood cell transfusions     Migraines     Multiple subsegmental pulmonary emboli without acute cor pulmonale (H) 02/01/2021    Oppositional defiant behavior     Presence of intrauterine contraceptive device 2/18/2020    Superficial venous thrombosis of arm, right 03/25/2021    Uncomplicated asthma      Past Surgical History:   Procedure Laterality Date    AS INSERT TUNNELED CV 2 CATH W/O PORT/PUMP      CHOLECYSTECTOMY      CV RIGHT HEART CATH MEASUREMENTS RECORDED N/A 11/18/2021    Procedure: Right Heart Cath;  Surgeon: Jackson Stauffer MD;  Location:  HEART CARDIAC CATH LAB    INSERT PORT VASCULAR ACCESS Left 4/21/2021    Procedure: INSERTION, VASCULAR ACCESS PORT (NOT SURE ON SIDE UNTIL REMOVAL);  Surgeon: Rajan More MD;  Location: [facility] OR    IR CHEST PORT PLACEMENT > 5 YRS OF AGE  4/21/2021    IR CVC NON TUNNEL LINE REMOVAL  5/6/2021    IR CVC NON TUNNEL PLACEMENT > 5 YRS  04/07/2020    IR CVC NON TUNNEL PLACEMENT > 5 YRS  4/30/2021    IR CVC NON TUNNEL PLACEMENT > 5 YRS  9/7/2022    IR PORT REMOVAL LEFT  4/21/2021    REMOVE PORT VASCULAR ACCESS Left 4/21/2021    Procedure: REMOVAL, VASCULAR ACCESS PORT LEFT;  Surgeon: Rajan More MD;  Location: [facility] OR    REPAIR TENDON ELBOW Right 10/02/2019    Procedure: Right Elbow Flexor Lengthening, Flexor Pronator Slide Of Wrist and Finger, Thumb Adductor Lengthening;  Surgeon: Anai Franco MD;  Location: [facility] OR    TONSILLECTOMY Bilateral 10/02/2019    Procedure: Bilateral Tonsillectomy;  Surgeon: Farhana Guy MD;  Location: [facility] OR    ZZC BREAST REDUCTION (INCLUDES LIPO) TIER 3 Bilateral 04/23/2019     aspirin (ASA) 81 MG chewable tablet  hydroxyurea (HYDREA) 500 MG capsule  albuterol (PROVENTIL) (2.5 MG/3ML) 0.083% neb solution  budesonide-formoterol (SYMBICORT) 160-4.5 MCG/ACT Inhaler  deferasirox (JADENU) 360 MG tablet  Deferiprone, Twice Daily, 1000 MG TABS  diphenhydrAMINE (BENADRYL) 50  "MG capsule  EPINEPHrine (ANY BX GENERIC EQUIV) 0.3 MG/0.3ML injection 2-pack  gabapentin (NEURONTIN) 300 MG capsule  ibuprofen (ADVIL/MOTRIN) 800 MG tablet  methocarbamol (ROBAXIN) 750 MG tablet  methylPREDNISolone (MEDROL) 32 MG tablet  naloxone (NARCAN) 4 MG/0.1ML nasal spray  oxyCODONE IR (ROXICODONE) 10 MG tablet      Allergies   Allergen Reactions    Contrast Dye Angioedema     Hives and breathing issues    Fish-Derived Products Hives    Seafood Hives    Adhesive Tape Hives     Primipore dressing causes hives    Gadolinium     Iodinated Contrast Media      Family History  Family History   Problem Relation Age of Onset    Sickle Cell Trait Mother     Hypertension Mother     Asthma Mother     Sickle Cell Trait Father     Glaucoma No family hx of     Macular Degeneration No family hx of     Diabetes No family hx of     Gout No family hx of      Social History   Social History     Tobacco Use    Smoking status: Never     Passive exposure: Never    Smokeless tobacco: Never   Substance Use Topics    Alcohol use: Not Currently     Alcohol/week: 0.0 standard drinks of alcohol    Drug use: Never      Past medical history, past surgical history, medications, allergies, family history, and social history were reviewed with the patient. No additional pertinent items.     A complete review of systems was performed with pertinent positives and negatives noted in the HPI, and all other systems negative.    Physical Exam   BP: 115/76  Pulse: 100  Temp: 98.5  F (36.9  C)  Resp: 14  Height: 162.6 cm (5' 4\")  Weight: 72.9 kg (160 lb 11.2 oz)  SpO2: 96 %    Physical Exam  Vital Signs Reviewed  Gen: Well nourished, well developed, resting comfortably, no acute distress  HEENT: NC/AT, PERRL, EOMI, MMM  Neck: Supple, FROM  CV: Regular Rate  Lungs/Chest: Normal Effort  Abd: Non-distended, non-tender  : External genitalia normal. Pink tinged mucous in posterior fornix otherwise no active bleeding noted on speculum exam.  MSK/Back: " FROM, no visible deformity  Neuro: A&Ox3, GCS 15, CN II-XII unremarkable. Strength and sensation globally intact.  Skin: Warm, Dry, Intact, no visible lesions    ED Course, Procedures, & Data      Procedures                Results for orders placed or performed during the hospital encounter of 12/06/24   US Pelvis Cmplt w Transvag & Doppler LmtPel Duplex Limited     Status: None    Narrative    EXAM: US PELVIS COMPLETE W TRANSVAGINAL AND DOPPLER LIMITED  LOCATION: [facility]  DATE: 12/6/2024    INDICATION: Pelvic pain, vaginal bleeding with clots  COMPARISON: None.  TECHNIQUE: Transabdominal scans were performed. Endovaginal ultrasound was performed to better visualize the adnexa. Color flow with spectral Doppler and waveform analysis performed.    FINDINGS:    UTERUS: 5.5 x 2.4 x 3.5 cm. Normal in size and position with no masses.    ENDOMETRIUM: 5-6 mm. Normal smooth endometrium.    RIGHT OVARY: 3.3 x 1.9 x 2.2 cm. Normal with arterial and venous duplex flow identified. Anechoic avascular dominant follicle measuring 1.9 cm.    LEFT OVARY: Not well seen due to bowel gas.    No significant free fluid.      Impression    IMPRESSION:    1.  Normal appearance of the uterus and endometrium.  2.  Dominant follicle within an otherwise normal-appearing right ovary.  3.  The left ovary is not well seen due to anatomic location and bowel gas.         Comprehensive metabolic panel     Status: Abnormal   Result Value Ref Range    Sodium 140 135 - 145 mmol/L    Potassium 3.9 3.4 - 5.3 mmol/L    Carbon Dioxide (CO2) 25 22 - 29 mmol/L    Anion Gap 10 7 - 15 mmol/L    Urea Nitrogen 6.0 6.0 - 20.0 mg/dL    Creatinine 0.71 0.51 - 0.95 mg/dL    GFR Estimate >90 >60 mL/min/1.73m2    Calcium 8.7 (L) 8.8 - 10.4 mg/dL    Chloride 105 98 - 107 mmol/L    Glucose 97 70 - 99 mg/dL    Alkaline Phosphatase 57 40 - 150 U/L    AST 78 (H) 0 - 45 U/L    ALT 86 (H) 0 - 50 U/L    Protein Total 7.0 6.4 -  8.3 g/dL    Albumin 4.2 3.5 - 5.2 g/dL    Bilirubin Total 2.8 (H) <=1.2 mg/dL   Reticulocyte count     Status: Abnormal   Result Value Ref Range    % Reticulocyte 18.4 (H) 0.5 - 2.0 %    Absolute Reticulocyte 0.413 (H) 0.025 - 0.095 10e6/uL   HCG qualitative Blood     Status: Normal   Result Value Ref Range    hCG Serum Qualitative Negative Negative   UA with Microscopic reflex to Culture     Status: Abnormal    Specimen: Urine, Midstream   Result Value Ref Range    Color Urine Yellow Colorless, Straw, Light Yellow, Yellow    Appearance Urine Clear Clear    Glucose Urine Negative Negative mg/dL    Bilirubin Urine Negative Negative    Ketones Urine Negative Negative mg/dL    Specific Gravity Urine 1.013 1.003 - 1.035    Blood Urine Negative Negative    pH Urine 6.5 5.0 - 7.0    Protein Albumin Urine Negative Negative mg/dL    Urobilinogen Urine >12.0 (A) Normal, 2.0 mg/dL    Nitrite Urine Negative Negative    Leukocyte Esterase Urine Negative Negative    RBC Urine 1 <=2 /HPF    WBC Urine 1 <=5 /HPF    Squamous Epithelials Urine <1 <=1 /HPF    Narrative    Urine Culture not indicated   CBC with platelets and differential     Status: Abnormal   Result Value Ref Range    WBC Count 11.2 (H) 4.0 - 11.0 10e3/uL    RBC Count 2.25 (L) 3.80 - 5.20 10e6/uL    Hemoglobin 6.9 (LL) 11.7 - 15.7 g/dL    Hematocrit 19.3 (L) 35.0 - 47.0 %    MCV 86 78 - 100 fL    MCH 30.7 26.5 - 33.0 pg    MCHC 35.8 31.5 - 36.5 g/dL    RDW 22.5 (H) 10.0 - 15.0 %    Platelet Count 404 150 - 450 10e3/uL    % Neutrophils 64 %    % Lymphocytes 21 %    % Monocytes 11 %    % Eosinophils 3 %    % Basophils 2 %    % Immature Granulocytes 0 %    NRBCs per 100 WBC 2 (H) <1 /100    Absolute Neutrophils 7.1 1.6 - 8.3 10e3/uL    Absolute Lymphocytes 2.3 0.8 - 5.3 10e3/uL    Absolute Monocytes 1.2 0.0 - 1.3 10e3/uL    Absolute Eosinophils 0.3 0.0 - 0.7 10e3/uL    Absolute Basophils 0.2 0.0 - 0.2 10e3/uL    Absolute Immature Granulocytes 0.1 <=0.4 10e3/uL     Absolute NRBCs 0.2 10e3/uL   Hemoglobin and hematocrit     Status: Abnormal   Result Value Ref Range    Hemoglobin 6.5 (LL) 11.7 - 15.7 g/dL    Hematocrit 18.3 (L) 35.0 - 47.0 %   Lactate Dehydrogenase     Status: Abnormal   Result Value Ref Range    Lactate Dehydrogenase 428 (H) 0 - 250 U/L   Adult Type and Screen     Status: None (Preliminary result)   Result Value Ref Range    ABO/RH(D) O POS     SPECIMEN EXPIRATION DATE [id number]    CBC with platelets differential     Status: Abnormal    Narrative    The following orders were created for panel order CBC with platelets differential.  Procedure                               Abnormality         Status                     ---------                               -----------         ------                     CBC with platelets and d...[[id number]]  Abnormal            Final result                 Please view results for these tests on the individual orders.   ABO/Rh type and screen     Status: None (In process)    Narrative    The following orders were created for panel order ABO/Rh type and screen.  Procedure                               Abnormality         Status                     ---------                               -----------         ------                     Adult Type and Screen[[id number]]                            Preliminary result           Please view results for these tests on the individual orders.     Medications   HYDROmorphone (DILAUDID) injection 1 mg (has no administration in time range)   HYDROmorphone (DILAUDID) injection 1 mg (1 mg Intravenous $Given 12/7/24 0108)   ketorolac (TORADOL) injection 30 mg (30 mg Intravenous $Given 12/6/24 2139)   lactated ringers BOLUS 1,000 mL (0 mLs Intravenous Stopped 12/7/24 0034)   ondansetron (ZOFRAN) injection 8 mg (8 mg Intravenous $Given 12/6/24 2135)     Labs Ordered and Resulted from Time of ED Arrival to Time of ED Departure   COMPREHENSIVE METABOLIC PANEL - Abnormal       Result Value     Sodium 140      Potassium 3.9      Carbon Dioxide (CO2) 25      Anion Gap 10      Urea Nitrogen 6.0      Creatinine 0.71      GFR Estimate >90      Calcium 8.7 (*)     Chloride 105      Glucose 97      Alkaline Phosphatase 57      AST 78 (*)     ALT 86 (*)     Protein Total 7.0      Albumin 4.2      Bilirubin Total 2.8 (*)    RETICULOCYTE COUNT - Abnormal    % Reticulocyte 18.4 (*)     Absolute Reticulocyte 0.413 (*)    ROUTINE UA WITH MICROSCOPIC REFLEX TO CULTURE - Abnormal    Color Urine Yellow      Appearance Urine Clear      Glucose Urine Negative      Bilirubin Urine Negative      Ketones Urine Negative      Specific Gravity Urine 1.013      Blood Urine Negative      pH Urine 6.5      Protein Albumin Urine Negative      Urobilinogen Urine >12.0 (*)     Nitrite Urine Negative      Leukocyte Esterase Urine Negative      RBC Urine 1      WBC Urine 1      Squamous Epithelials Urine <1     CBC WITH PLATELETS AND DIFFERENTIAL - Abnormal    WBC Count 11.2 (*)     RBC Count 2.25 (*)     Hemoglobin 6.9 (*)     Hematocrit 19.3 (*)     MCV 86      MCH 30.7      MCHC 35.8      RDW 22.5 (*)     Platelet Count 404      % Neutrophils 64      % Lymphocytes 21      % Monocytes 11      % Eosinophils 3      % Basophils 2      % Immature Granulocytes 0      NRBCs per 100 WBC 2 (*)     Absolute Neutrophils 7.1      Absolute Lymphocytes 2.3      Absolute Monocytes 1.2      Absolute Eosinophils 0.3      Absolute Basophils 0.2      Absolute Immature Granulocytes 0.1      Absolute NRBCs 0.2     HEMOGLOBIN AND HEMATOCRIT - Abnormal    Hemoglobin 6.5 (*)     Hematocrit 18.3 (*)    LACTATE DEHYDROGENASE - Abnormal    Lactate Dehydrogenase 428 (*)    HCG QUALITATIVE PREGNANCY - Normal    hCG Serum Qualitative Negative     FERRITIN   IRON AND IRON BINDING CAPACITY   HAPTOGLOBIN   TYPE AND SCREEN, ADULT    ABO/RH(D) O POS      SPECIMEN EXPIRATION DATE [id number]     ABO/RH TYPE AND SCREEN     US Pelvis Cmplt w Transvag & Doppler LmtPel  Duplex Limited   Final Result   IMPRESSION:     1.  Normal appearance of the uterus and endometrium.   2.  Dominant follicle within an otherwise normal-appearing right ovary.   3.  The left ovary is not well seen due to anatomic location and bowel gas.               US Renal Complete Non-Vascular    (Results Pending)          Critical care was not performed.     Medical Decision Making  The patient's presentation was of high complexity (an acute health issue posing potential threat to life or bodily function).    The patient's evaluation involved:  ordering and/or review of 3+ test(s) in this encounter (see separate area of note for details)  discussion of management or test interpretation with another health professional (Medicine, Heme, OB/Gyn)    The patient's management necessitated high risk (a parenteral controlled substance) and high risk (a decision regarding hospitalization).    Assessment & Plan    Jennifer Cervantes is a 24 year old female with a history of sickle cell disease c/b frequent pain crises with ED care plan in place, prior stroke leading to cognitive delays and RUE hemiparesis, PE not anticoagulated, iron overload 2/2 chronic transfusions, anxiety/depression, and asthma who presents to the ED for evaluation of abdominal pain and blood that she describes is clotted when going to the bathroom.  On arrival patient has a reassuring blood pressure, heart rate is high normal.  She is breathing easily on room air and afebrile.  Abdominal exam is unremarkable.  Patient describes pain as similar to prior crises however this vaginal or urinary bleeding is new for her.  She is not sure which however when she urinated today she did not notice the bleeding so I wonder if this is vaginal bleeding rather than hematuria is initially reported during triage intake.    Labs today show hemoglobin of 6.9 which is slightly below her baseline.  On recheck it did drop to 6.5.  Anemia labs were ordered, she does have an  elevated LDH, she also has an elevated reticulocyte count.  Metabolic panel is overall reassuring.  Bilirubin is slightly elevated above her baseline but not significantly so.    Rolf him is unremarkable on a ultrasound.  Will get a renal ultrasound to evaluate kidneys. Lower clinical suspicion for kidney stone at this time. No hematuria on urinalysis however urobilinogen is elevated.    Discussed management with on-call hematology fellow Dr. Rees.  Given patient's hemodynamic stability we will get type and screen but will not transfuse at this time unless there is brisk bleeding noted or hemodynamic changes.  Formal hematology consult to follow in the morning.    Discussed pelvic exam findings with OB/GYN team.  This could be some intermittent breakthrough bleeding which can occur not uncommonly on Nexplanon.  They will evaluate patient and provide recommendations to medicine/hematology team.    Patient stable for admission to medicine at this time.  Accepted by hospitalist.      New Prescriptions    No medications on file       Final diagnoses:   Sickle cell pain crisis (H)   Anemia, unspecified type   Vaginal bleeding       Jesus Rhodes Jr., MD  [facility] EMERGENCY DEPARTMENT  12/6/2024     Jesus Rhodes MD  12/07/24 0228

## 2024-12-07 NOTE — ED NOTES
Pt transferred to unit 8MS. Instructions and education provided, pt stated understanding. Nurse to nurse handoff report given to JOE Chandler. Pt's condition discussed and plan of care reviewed. All questions and concerns addressed prior to transfer.

## 2024-12-07 NOTE — PROGRESS NOTES
8A Admission Note    Reason for admission: Acute hematuria  Primary team notified of pt arrival.  Admitted from: [facility]  Via: WC transport  Accompanied by: N/A  Belongings: Remains with patient, did not want to be placed in the closet  Admission Required Doc Completed: Yes  Teaching: Orientation to unit and call light- call light within reach, use of console, meal times, when to call for the RN, and enforced importance of safety.  IV Access: Left SL Port A cath  Telemetry: No  Ht./Wt.: Completed  Code Status verified on armband: Yes/No  2 RN Skin Assessment Completed with: Pt refused  Suction/Ambu bag/Flowmeter at bedside: Yes    Pt status:   Temp: 98.4  F (36.9  C) Temp src: Oral BP: 137/80 Pulse: 88   Resp: 16 SpO2: 90 % O2 Device: None (Room air)

## 2024-12-07 NOTE — PROGRESS NOTES
"On call Hematology/oncology fellow note.     Consult question: do you think this pt would benefit PRBC at this moment?    I received a call from the pt regarding threshold for transfusion in a sickle cell patient.     Pt is a 25 yo F with sickle cell anemia (Hb SS), h/o iron overload due to transfusion, h/o PE, in the ED for uterine bleeding vs hematuria.      She was just discharged from the hospital from her admission 11/27-12/4/2024 for sickle cell pain crisis.   Her pain was treated with oxycodone 10mg up to x6 per day, and iv dilaudid (no PCA per pt's preference).    This time, the pt came to the ED due to uterine bleeding vs hematuria a/w clots. ED is unsure at this moment about which the source of the bleed is. Hb on arrival was 6.9 (down from 8.0 from her recent discharge 12/4), then later her Hb was 6.5. Per ED, they are unsure of how fast or slow her bleed is, so they plan to admit her and monitor. ED (or primary team if assigned to a team by then) plans to consult Ob/gyn in the morning.     Per ED report, the pt reports that her pain has cont to improve since her discharge. She only reports having \"slight fatigue\".    VS HR low 100s, normotensive, 96% on room air    -> The ED is asking what her transfusion threshold should be, and if she would benefit from transfusion at this moment.     #Acute uterine bleeding vs hematuria  #Acute on chronic anemia  #Sickle cell disease  #Iron overload 2/2 transfusion    Plan)  As a general rule, we approach cautiously when to transfuse PRBC in sickle cell patients to avoid iron overload. This is especially the case if the pt has a h/o iron overload which this pt has. We also go more by the patient's symptoms (like fatigue, SOB) to give PRBC or not, when the H/H is around the borderline.   Per my review of her H/H trend and at what point she has received PRBC in the past, she seems to have received PRBC when there has been persistent Hb in mid 6s, or when it goes down " "eventually to low 6s.   Per ED report, tonight her Hb is 6.9 then ->6.5 currently. VS showed tachycardic in low 100s, normotensive. She does not seem to have much of fatigue (per ED \"only slight\"). Clinically, it seems to be difficult to quantify or identify how fast she is bleeding (not that she is having an active/fast bleeding). Based on all these, while I understand that this current context is different from transfusion threshold in the sickle cell setting given that she is having bleeding, I think we can hold off PRBC at this moment, can cont to monitor clinically and her H/H, and consider PRBC if needed (if further drop in H/H, or if her Hb persistently stays in mid 6s). At that point, I will discuss with heme attending for consideration for possible PRBC.   -would cont close monitoring of her bleeding clinically and frequent CBC check  -if worsening bleeding symptoms, worsening fatigue or new SOB, check CBC and if she has further H/H drop contact the on-call fellow (this writer).  -her main problem seem to be the bleeding. If you need a formal heme consult please place order.     Go Negrita  Hem/onc fellow  Pager [phone]    "

## 2024-12-07 NOTE — H&P
[facility]    History and Physical - Hospitalist Service, GOLD TEAM        Date of Admission:  12/6/2024    Assessment & Plan      Jennifer Cervantes is a 23 yo F with PMH Of asthma, sickle cell dx, hx of CVA, hx of PE not on AC who presented with acute hematuria vs vaginal bleeding and acute pain    #Acute VOD pain crisis  #Abd pain  #Sickle cell dx  #Microcytic anemia  :: Pain plan followed in ED, discussed PCA with patient (her current pain plan) says that Q2 hr hydromorphone was giving adequate relief just before she discharged last time, says she doesn't feel like her pain reaches level of needign PCA, using SDM plan to do q2h  prns and monitor  :: Dilaudid q2h prn, PO oxy (home dose) for moderate pains  :: zofran prn  :: c/w home dose methocarbamol  :: c/w home hydroxurea, defeiprone, deferasirox  :: bowel regimen prn  :: trend CBC and retic  :: consider Heme consult in the AM    #Suspected vaginal bleeding  :: passing clots and have some pain and cramping, see OB GYN note for details, briefly, suspect this is related to her nexplanon and is not unexpected, no urgent or emergent GYN plans. Can consider changing contraception in the outpatient setting but options are limited due to her VTE risk  :: monitor      #Asthma  :: JONG nebs prn  :: home inhaler not ordered, is OBS status, can relabel home inhaler if brought into hospital    #HX of CVA  #HX of PE  :: Holding ASA for now  :: monitor           Observation Goals: -diagnostic tests and consults completed and resulted, -vital signs normal or at patient baseline, -adequate pain control on oral analgesics, Nurse to notify provider when observation goals have been met and patient is ready for discharge.  Diet: Regular Diet Adult    DVT Prophylaxis: Pneumatic Compression Devices  Lopez Catheter: Not present  Lines: PRESENT      Port a Cath 11/25/24 Single Lumen Left Chest wall-Site Assessment: WDL      Cardiac Monitoring:  "None  Code Status: Full Code      Clinically Significant Risk Factors Present on Admission                 # Drug Induced Platelet Defect: home medication list includes an antiplatelet medication        # Anemia: based on hgb <11       # Overweight: Estimated body mass index is 27.58 kg/m  as calculated from the following:    Height as of this encounter: 1.626 m (5' 4\").    Weight as of this encounter: 72.9 kg (160 lb 11.2 oz).       # Financial/Environmental Concerns:    # Asthma: noted on problem list        Disposition Plan     Medically Ready for Discharge: Anticipated Tomorrow           Vito Domingo MD  Hospitalist Service, [facility]  Securely message with [employer] (more info)  Text page via [facility] Paging/Directory   See signed in provider for up to date coverage information    ______________________________________________________________________    Chief Complaint   bleeding    History is obtained from the patient    History of Present Illness   Jennifer Cervantes is a 23 yo F with PMH Of asthma, sickle cell dx, hx of CVA, hx of PE not on AC who presented with acute hematuria vs vaginal bleeding and acute pain  Says bleeding has been going on for a while, couple of weeks maybe  Says having abd pain and nausea that started today no FCS no chest pain, diffuse pain all over that feels like sickle pain, low energy  No SOB no cough no wheeze  Last BM was today  UOP is nl, no burning or urinary pain  Compliant on meds, no problems getting meds refilled etc  MomRohini is NOK and health care decision maker, ok to give updates without any info restrictions      Past Medical History    Past Medical History:   Diagnosis Date    Anxiety     Bleeding disorder (H)     Blood clotting disorder (H)     Cerebral infarction (H) 2015    Cognitive developmental delay     low IQ. Please recognize when managing pain and planning with her    Depressive disorder     Hemiplegia " and hemiparesis following cerebral infarction affecting right dominant side (H)     right hand contractures    Iron overload due to repeated red blood cell transfusions     Migraines     Multiple subsegmental pulmonary emboli without acute cor pulmonale (H) 02/01/2021    Oppositional defiant behavior     Presence of intrauterine contraceptive device 2/18/2020    Superficial venous thrombosis of arm, right 03/25/2021    Uncomplicated asthma        Past Surgical History   Past Surgical History:   Procedure Laterality Date    AS INSERT TUNNELED CV 2 CATH W/O PORT/PUMP      CHOLECYSTECTOMY      CV RIGHT HEART CATH MEASUREMENTS RECORDED N/A 11/18/2021    Procedure: Right Heart Cath;  Surgeon: Jackson Stauffer MD;  Location:  HEART CARDIAC CATH LAB    INSERT PORT VASCULAR ACCESS Left 4/21/2021    Procedure: INSERTION, VASCULAR ACCESS PORT (NOT SURE ON SIDE UNTIL REMOVAL);  Surgeon: Rajan More MD;  Location: [facility] OR    IR CHEST PORT PLACEMENT > 5 YRS OF AGE  4/21/2021    IR CVC NON TUNNEL LINE REMOVAL  5/6/2021    IR CVC NON TUNNEL PLACEMENT > 5 YRS  04/07/2020    IR CVC NON TUNNEL PLACEMENT > 5 YRS  4/30/2021    IR CVC NON TUNNEL PLACEMENT > 5 YRS  9/7/2022    IR PORT REMOVAL LEFT  4/21/2021    REMOVE PORT VASCULAR ACCESS Left 4/21/2021    Procedure: REMOVAL, VASCULAR ACCESS PORT LEFT;  Surgeon: Rajan More MD;  Location: [facility] OR    REPAIR TENDON ELBOW Right 10/02/2019    Procedure: Right Elbow Flexor Lengthening, Flexor Pronator Slide Of Wrist and Finger, Thumb Adductor Lengthening;  Surgeon: Anai Franco MD;  Location: [facility] OR    TONSILLECTOMY Bilateral 10/02/2019    Procedure: Bilateral Tonsillectomy;  Surgeon: Farhana Guy MD;  Location: [facility] OR    ZZC BREAST REDUCTION (INCLUDES LIPO) TIER 3 Bilateral 04/23/2019       Prior to Admission Medications   Prior to Admission Medications   Prescriptions Last Dose Informant Patient Reported? Taking?   Deferiprone, Twice Daily, 1000 MG TABS   No  No   Sig: Take 2,000 mg by mouth 2 times daily.   EPINEPHrine (ANY BX GENERIC EQUIV) 0.3 MG/0.3ML injection 2-pack   No No   Sig: Inject 0.3 mLs (0.3 mg) into the muscle as needed for anaphylaxis.   albuterol (PROVENTIL) (2.5 MG/3ML) 0.083% neb solution   No No   Sig: Take 2 vials (5 mg) by nebulization every 6 hours as needed for shortness of breath or wheezing.   aspirin (ASA) 81 MG chewable tablet 12/6/2024  No Yes   Sig: Take 1 tablet (81 mg) by mouth 2 times daily   budesonide-formoterol (SYMBICORT) 160-4.5 MCG/ACT Inhaler   No No   Sig: Inhale 2 puffs twice daily plus 1-2 puffs as needed. May use up to 12 puffs per day.   deferasirox (JADENU) 360 MG tablet   No No   Sig: Take 4 tablets (1,440 mg) by mouth daily.   diphenhydrAMINE (BENADRYL) 50 MG capsule   No No   Sig: Administer 12  hours pre - IV contrast injection and 2 hours prior to contrast. Patient must have a .   gabapentin (NEURONTIN) 300 MG capsule   No No   Sig: Take 1 capsule (300 mg) by mouth 3 times daily. Take PO 1 pill in evening (300 mg) TID to start. If tolerated, increase by 300 mg in morning or lunch every few days, updating your doctor's office in time to get refills. The maximum dose is 900 mg TID.   Patient taking differently: Take 600 mg by mouth at bedtime.   hydroxyurea (HYDREA) 500 MG capsule 12/6/2024  No Yes   Sig: Take 6 capsules (3,000 mg) by mouth daily   ibuprofen (ADVIL/MOTRIN) 800 MG tablet   Yes No   Sig: Take 800 mg by mouth every 8 hours as needed for moderate pain   methocarbamol (ROBAXIN) 750 MG tablet   No No   Sig: Take 1 tablet (750 mg) by mouth 4 times daily as needed for muscle spasms (during sickle pain crises. Okay to take scheduled while in pain).   methylPREDNISolone (MEDROL) 32 MG tablet   No No   Sig: Take 1 tab 12 hours before appointment and 1 tab 2 hours prior to the procedure with IV contrast.   naloxone (NARCAN) 4 MG/0.1ML nasal spray   Yes No   Sig: Spray 4 mg into one nostril alternating nostrils  as needed for opioid reversal. every 2-3 minutes until assistance arrives   oxyCODONE IR (ROXICODONE) 10 MG tablet   No No   Sig: Take 1 tablet (10 mg) by mouth every 6 hours as needed for severe pain or breakthrough pain (must last 2 weeks). Goal 4 per day. Max 6 per day.      Facility-Administered Medications: None        Review of Systems    The 5 point Review of Systems is negative other than noted in the HPI or here.     Social History   I have reviewed this patient's social history and updated it with pertinent information if needed.  Social History     Tobacco Use    Smoking status: Never     Passive exposure: Never    Smokeless tobacco: Never   Substance Use Topics    Alcohol use: Not Currently     Alcohol/week: 0.0 standard drinks of alcohol    Drug use: Never         Allergies   Allergies   Allergen Reactions    Contrast Dye Angioedema     Hives and breathing issues    Fish-Derived Products Hives    Seafood Hives    Adhesive Tape Hives     Primipore dressing causes hives    Gadolinium     Iodinated Contrast Media         Physical Exam   Vital Signs: Temp: 98.1  F (36.7  C) Temp src: Oral BP: 121/80 Pulse: 85   Resp: 16 SpO2: 95 % O2 Device: None (Room air)    Weight: 160 lbs 11.2 oz    EXAM  General: tired appearing woman lying up in bed in NAD, pleasant and conversant  Head: NC, AT  Eye: symm gaze, anicteric sclerae  ENT: patent nares wo drainage/epistaxis  Pulm: CTAB anteriorly, comfortable WOB on RA  CV: normal rate, regular rhythm,   Neuro: awake, alert, hearing speech and phonation, intact grossly      Medical Decision Making       75 MINUTES SPENT BY ME on the date of service doing chart review, history, exam, documentation & further activities per the note.  MANAGEMENT DISCUSSED with the following over the past 24 hours: dr zuniga, RN bedside   Tests ORDERED & REVIEWED in the past 24 hours:  - See lab/imaging results included in the data section of the note  Medical complexity over the past 24  hours:  - Parenteral (IV) CONTROLLED SUBSTANCES ordered      Data     I have personally reviewed the following data over the past 24 hrs:    11.2 (H)  \   6.5 (LL)   / 404     140 105 6.0 /  97   3.9 25 0.71 \     ALT: 86 (H) AST: 78 (H) AP: 57 TBILI: 2.8 (H)   ALB: 4.2 TOT PROTEIN: 7.0 LIPASE: N/A     Ferritin:  5,942 (H) % Retic:  18.4 (H) LDH:  428 (H)       Imaging results reviewed over the past 24 hrs:   Recent Results (from the past 24 hours)   US Pelvis Cmplt w Transvag & Doppler LmtPel Duplex Limited    Narrative    EXAM: US PELVIS COMPLETE W TRANSVAGINAL AND DOPPLER LIMITED  LOCATION: [facility]  DATE: 12/6/2024    INDICATION: Pelvic pain, vaginal bleeding with clots  COMPARISON: None.  TECHNIQUE: Transabdominal scans were performed. Endovaginal ultrasound was performed to better visualize the adnexa. Color flow with spectral Doppler and waveform analysis performed.    FINDINGS:    UTERUS: 5.5 x 2.4 x 3.5 cm. Normal in size and position with no masses.    ENDOMETRIUM: 5-6 mm. Normal smooth endometrium.    RIGHT OVARY: 3.3 x 1.9 x 2.2 cm. Normal with arterial and venous duplex flow identified. Anechoic avascular dominant follicle measuring 1.9 cm.    LEFT OVARY: Not well seen due to bowel gas.    No significant free fluid.      Impression    IMPRESSION:    1.  Normal appearance of the uterus and endometrium.  2.  Dominant follicle within an otherwise normal-appearing right ovary.  3.  The left ovary is not well seen due to anatomic location and bowel gas.         US Renal Complete Non-Vascular    Narrative    EXAM: US RENAL COMPLETE NON-VASCULAR  LOCATION: [facility]  DATE: 12/7/2024    INDICATION: hematuria  COMPARISON: None.  TECHNIQUE: Routine Bilateral Renal and Bladder Ultrasound.    FINDINGS:    RIGHT KIDNEY: 11.3 cm. Normal without hydronephrosis or masses.     LEFT KIDNEY: 10.8 cm. Normal without  hydronephrosis or masses.     BLADDER: Urinary bladder is partially distended and segmentally seen. No bladder abnormalities demonstrated. Bilateral ureters jets demonstrated.      Impression    IMPRESSION:  1.  Normal kidney ultrasound.

## 2024-12-07 NOTE — PLAN OF CARE
Goal Outcome Evaluation:      Plan of Care Reviewed With: patient    Overall Patient Progress: improving    Outcome Evaluation: Pt was newly admintted for hematuria, abdominal pain being manged with IV diluadid PRN q2H.    O2: On RA   Output: Continent of B/B   Activity: Independent with ADLs   Skin: Refused full skin assessment, visible skin intact   Pain: Abdominal pain controlled with IV diluadid PRN   CMS: Intact, A/O x4, able to make needs known   Dressing: L Port A Cath dressing CDI   Diet: Regular   LDA: L Port A Cath   Equipment: None   Plan: Con't POC.    Additional Info: Pt is calm and cooperative with cares. No new concerns noted this shift.

## 2024-12-07 NOTE — CONSULTS
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 She is well-known to the Hematology service and was recently discharged and is returning to the [facility] with hematuria versus vaginal bleeding and abdominal pain.    History is obtained from EMR and patient account.    She had a recent admission 11/27-12/4/2024 for sickle cell pain episode and asthma exacerbation. Her pain has reportedly well-controlled since discharge. However 12/6/4, she presented to the ED reporting uterine bleeding versus hematuria associated with clots as well as abdominal pain. Pain is not typical for what she experiences during pain episodes (reportedly more diffuse and involving bones, back). Ms Cervantes is also endorsing nausea, though she has not vomited and is still able to eat. Interview was shortened as patient is frustrated with having to answer the same questions multiple times.     For an account of her hematologic history, please see Dr Duncan's note from 10/21/2024 and Reta Mantilla's note from 11/25/2024.     Subjective    Review of Systems:  A comprehensive ROS was performed and found to be negative or non-contributory with the exception of that noted in the HPI above.    Past Medical History:   Diagnosis Date    Anxiety     Bleeding disorder (H)     Blood clotting disorder (H)     Cerebral infarction (H) 2015    Cognitive developmental delay     low IQ. Please recognize when managing pain and planning with  her    Depressive disorder     Hemiplegia and hemiparesis following cerebral infarction affecting right dominant side (H)     right hand contractures    Iron overload due to repeated red blood cell transfusions     Migraines     Multiple subsegmental pulmonary emboli without acute cor pulmonale (H) 02/01/2021    Oppositional defiant behavior     Presence of intrauterine contraceptive device 2/18/2020    Superficial venous thrombosis of arm, right 03/25/2021    Uncomplicated asthma      Past Surgical History:   Procedure Laterality Date    AS INSERT TUNNELED CV 2 CATH W/O PORT/PUMP      CHOLECYSTECTOMY      CV RIGHT HEART CATH MEASUREMENTS RECORDED N/A 11/18/2021    Procedure: Right Heart Cath;  Surgeon: Jackson Stauffer MD;  Location:  HEART CARDIAC CATH LAB    INSERT PORT VASCULAR ACCESS Left 4/21/2021    Procedure: INSERTION, VASCULAR ACCESS PORT (NOT SURE ON SIDE UNTIL REMOVAL);  Surgeon: Rajan More MD;  Location: [facility] OR    IR CHEST PORT PLACEMENT > 5 YRS OF AGE  4/21/2021    IR CVC NON TUNNEL LINE REMOVAL  5/6/2021    IR CVC NON TUNNEL PLACEMENT > 5 YRS  04/07/2020    IR CVC NON TUNNEL PLACEMENT > 5 YRS  4/30/2021    IR CVC NON TUNNEL PLACEMENT > 5 YRS  9/7/2022    IR PORT REMOVAL LEFT  4/21/2021    REMOVE PORT VASCULAR ACCESS Left 4/21/2021    Procedure: REMOVAL, VASCULAR ACCESS PORT LEFT;  Surgeon: Rajan More MD;  Location: [facility] OR    REPAIR TENDON ELBOW Right 10/02/2019    Procedure: Right Elbow Flexor Lengthening, Flexor Pronator Slide Of Wrist and Finger, Thumb Adductor Lengthening;  Surgeon: Anai Franco MD;  Location: [facility] OR    TONSILLECTOMY Bilateral 10/02/2019    Procedure: Bilateral Tonsillectomy;  Surgeon: Farhana Guy MD;  Location: [facility] OR    ZZC BREAST REDUCTION (INCLUDES LIPO) TIER 3 Bilateral 04/23/2019     Social History     Socioeconomic History    Marital status: Single     Spouse name: None    Number of children: None    Years of education: None    Highest  "education level: None   Tobacco Use    Smoking status: Never     Passive exposure: Never    Smokeless tobacco: Never   Substance and Sexual Activity    Alcohol use: Not Currently     Alcohol/week: 0.0 standard drinks of alcohol    Drug use: Never    Sexual activity: Not Currently     Partners: Male     Birth control/protection: Implant   Social History Narrative    Lives with mom and extended family (mom is her PCA and ILS worker) but \"toxic environment\" per her report. As of 9/28/2022 she is planning to move out at some point soon. She has minimal support at home despite her significant SCD comorbidities and cognitive delay from stroke.     Social Drivers of Health     Financial Resource Strain: Low Risk  (11/28/2024)    Financial Resource Strain     Within the past 12 months, have you or your family members you live with been unable to get utilities (heat, electricity) when it was really needed?: No   Food Insecurity: Low Risk  (11/28/2024)    Food Insecurity     Within the past 12 months, did you worry that your food would run out before you got money to buy more?: No     Within the past 12 months, did the food you bought just not last and you didn t have money to get more?: No   Transportation Needs: Low Risk  (11/28/2024)    Transportation Needs     Within the past 12 months, has lack of transportation kept you from medical appointments, getting your medicines, non-medical meetings or appointments, work, or from getting things that you need?: No   Physical Activity: Unknown (7/3/2024)    Exercise Vital Sign     Days of Exercise per Week: 1 day   Stress: No Stress Concern Present (7/3/2024)    [employer] - Occupational Stress Questionnaire     Feeling of Stress : Only a little   Social Connections: Unknown (7/3/2024)    Social Connection and Isolation Panel [NHANES]     Frequency of Social Gatherings with Friends and Family: Never   Interpersonal Safety: Low Risk  (11/28/2024)    " Interpersonal Safety     Do you feel physically and emotionally safe where you currently live?: Yes     Within the past 12 months, have you been hit, slapped, kicked or otherwise physically hurt by someone?: No     Within the past 12 months, have you been humiliated or emotionally abused in other ways by your partner or ex-partner?: No   Housing Stability: High Risk (11/28/2024)    Housing Stability     Do you have housing? : No     Are you worried about losing your housing?: No      Family History   Problem Relation Age of Onset    Sickle Cell Trait Mother     Hypertension Mother     Asthma Mother     Sickle Cell Trait Father     Glaucoma No family hx of     Macular Degeneration No family hx of     Diabetes No family hx of     Gout No family hx of      (Not in a hospital admission)    Current Outpatient Medications   Medication Sig Dispense Refill    aspirin (ASA) 81 MG chewable tablet Take 1 tablet (81 mg) by mouth 2 times daily 60 tablet 11    hydroxyurea (HYDREA) 500 MG capsule Take 6 capsules (3,000 mg) by mouth daily 180 capsule 11    albuterol (PROVENTIL) (2.5 MG/3ML) 0.083% neb solution Take 2 vials (5 mg) by nebulization every 6 hours as needed for shortness of breath or wheezing. 90 mL 3    budesonide-formoterol (SYMBICORT) 160-4.5 MCG/ACT Inhaler Inhale 2 puffs twice daily plus 1-2 puffs as needed. May use up to 12 puffs per day. 20.4 g 11    deferasirox (JADENU) 360 MG tablet Take 4 tablets (1,440 mg) by mouth daily. 120 tablet 11    Deferiprone, Twice Daily, 1000 MG TABS Take 2,000 mg by mouth 2 times daily. 150 tablet 3    diphenhydrAMINE (BENADRYL) 50 MG capsule Administer 12  hours pre - IV contrast injection and 2 hours prior to contrast. Patient must have a . 2 capsule 0    EPINEPHrine (ANY BX GENERIC EQUIV) 0.3 MG/0.3ML injection 2-pack Inject 0.3 mLs (0.3 mg) into the muscle as needed for anaphylaxis. 1 each 1    gabapentin (NEURONTIN) 300 MG capsule Take 1 capsule (300 mg) by mouth 3 times  "daily. Take PO 1 pill in evening (300 mg) TID to start. If tolerated, increase by 300 mg in morning or lunch every few days, updating your doctor's office in time to get refills. The maximum dose is 900 mg TID. (Patient taking differently: Take 600 mg by mouth at bedtime.) 90 capsule 1    ibuprofen (ADVIL/MOTRIN) 800 MG tablet Take 800 mg by mouth every 8 hours as needed for moderate pain      methocarbamol (ROBAXIN) 750 MG tablet Take 1 tablet (750 mg) by mouth 4 times daily as needed for muscle spasms (during sickle pain crises. Okay to take scheduled while in pain). 60 tablet 1    methylPREDNISolone (MEDROL) 32 MG tablet Take 1 tab 12 hours before appointment and 1 tab 2 hours prior to the procedure with IV contrast. 2 tablet 0    naloxone (NARCAN) 4 MG/0.1ML nasal spray Spray 4 mg into one nostril alternating nostrils as needed for opioid reversal. every 2-3 minutes until assistance arrives 0.2 mL 0    oxyCODONE IR (ROXICODONE) 10 MG tablet Take 1 tablet (10 mg) by mouth every 6 hours as needed for severe pain or breakthrough pain (must last 2 weeks). Goal 4 per day. Max 6 per day. 15 tablet 0     Objective   Physical Exam:    Blood pressure 111/67, pulse 88, temperature 98.1  F (36.7  C), temperature source Oral, resp. rate 16, height 1.626 m (5' 4\"), weight 72.9 kg (160 lb 11.2 oz), SpO2 92%, not currently breastfeeding.    Physical Exam  Lying lateral decubitus  Alert and fully oriented  Speaking in full sentences  No extra heart sounds  Clear vesicular breath sounds on room air  Abd exam deferred  RUE not examined  Lower extremities symmetric    Labs & Studies: I personally reviewed the following studies:  ROUTINE LABS (Last four results):  CMP  Recent Labs   Lab 12/07/24  0848 12/06/24  2129 12/05/24  0947 12/04/24  0542    140 138 136   POTASSIUM 3.9 3.9 3.9 3.9   CHLORIDE 109* 105 102 99   CO2 25 25 24 27   ANIONGAP 8 10 12 10   GLC 93 97 116* 80   BUN 7.0 6.0 6.9 6.5   CR 0.63 0.71 0.53 0.48* "   GFRESTIMATED >90 >90 >90 >90   MICAH 8.4* 8.7* 9.4 9.1   PROTTOTAL 6.5 7.0  --   --    ALBUMIN 4.0 4.2  --   --    BILITOTAL 2.8* 2.8*  --   --    ALKPHOS 52 57  --   --    AST 65* 78*  --   --    ALT 75* 86*  --   --      CBC  Recent Labs   Lab 12/07/24  0848 12/07/24  0039 12/06/24  2129 12/05/24  0947 12/04/24  0542   WBC 11.2*  --  11.2* 10.8 10.5   RBC 2.10*  --  2.25* 2.64* 2.33*   HGB 6.4* 6.5* 6.9* 8.0* 7.3*   HCT 18.2* 18.3* 19.3* 22.1* 20.5*   MCV 87  --  86 84 88   MCH 30.5  --  30.7 30.3 31.3   MCHC 35.2  --  35.8 36.2 35.6   RDW 22.3*  --  22.5* 23.1* 24.2*     --  404 429 373     INRNo lab results found in last 7 days.    Imaging:  US Renal Complete Non-Vascular   Final Result   IMPRESSION:   1.  Normal kidney ultrasound.         US Pelvis Cmplt w Transvag & Doppler LmtPel Duplex Limited   Final Result   IMPRESSION:     1.  Normal appearance of the uterus and endometrium.   2.  Dominant follicle within an otherwise normal-appearing right ovary.   3.  The left ovary is not well seen due to anatomic location and bowel gas.                          Assessment:   Jennifer Cervantes is a 24-year-old woman with sickle cell anemia (Hb SS), history of stroke and cognitive delay, right upper extremity hemiparesis, iron overload due to transfusions, multiple thromboembolic events despite anticoagulation use, on hydroxyurea and chelation therapy, and recent admission 11/27-12/4/2024 for pain episode and asthma exacerbation, who is admitted with vaginal vs urethral bleeding, pain episode, and elevated liver enzymes.    Trigger to current episode is unclear. While liver enzymes are elevated, current episode is so far uncomplicated with no current evidence to support acute chest syndrome or other serious complications. Should values of AST and ALT remain elevated, imaging would however be warranted.      Recommendations:  Acute Vaso-Occlusive Pain Episode  --Continue aggressive pain control per pain plan/primary  team  --Aggressive bowel regimen  --Consider topicals (lidocaine patch, diclofenac gel, warm packs) if helpful  --Please avoid IV diphenhydramine    Acute Chest Syndrome (ACS) Prevention  --Monitor oxygenation and respiratory rate carefully  --Encourage incentive spirometry and ambulation  --Monitor for fluid overload  --Please page Hematology if patient develops fever, acute shortness of breath, or other new symptoms concerning for acute chest syndrome    General Sickle Cell Management  --Daily CBC with differential, bilirubin, retic count for now  --Please discuss transfusion with Hematology first if primary team is considering this  --Continue hydroxyurea and deferasirox    Elevated liver enzymes  - liver panel with daily chemistries and CBC  - US RUQ if AST and ALT remain high over the course of the hospital stay    Vaginal Bleeding  - management deferred to OB-GYN and Primary teams        Discussed with patient, and Scotty Gracia and Norberto.      Ifeanyi Rios  PGY5  Hem/Onc/BMT Fellow  [place]  pager     Attending Note:  I have reviewed the patient chart, and interviewed and examined the patient.  I agree with the assessment and plan.  Nighat Thornton MD  Hematology

## 2024-12-07 NOTE — PROGRESS NOTES
[facility]    Medicine Progress Note - Hospitalist Service, GOLD TEAM 17    Date of Admission:  12/6/2024    Assessment & Plan   Jennifer Cervantes is a 24 year old F with PMH of PE, not on AC, SCD c/b CVA who presented with acute hematuria vs vaginal bleeding and acute worsening of back pain consistent with sickle cell pain crisis.  Had just been hospitalized 11/27-12/4/2024 for sickle cell pain crisis complicated by acute asthma exacerbation with hypoxemic respiratory failure which had resolved.         #Suspected vaginal bleeding:  Pelvic and renal ultrasounds normal.  UA bland, no RBCs. Seen by OB/GYN 12/7/24, felt her bleeding was consistent with a regular bleeding often experience by patient's using Nexplanon, and her bleeding is not felt to be excessively heavy based on her history.  Denies dysuria.  No fevers or chills.  No active abdominal pain.  Eating and drinking well now.  -Per OB/GYN, can be treated with OCPs, however given history of PE and CVA not a good candidate for estrogen containing contraceptive or TXA.  May consider transition from Nexplanon to alternative, nonestrogen containing contraceptive (such as Mirena IUD) in the outpatient setting.     #Elevated transaminases:  Mildly elevated transaminases of unclear etiology.  Alkaline phos normal.  Mildly elevated total bilirubin not unexpected with sickle cell crisis.  No active symptoms, abdominal exam benign.  Eating and drinking well.  -Monitor daily CMP  -Monitor clinically, if transaminases not improving will consider abdominal ultrasound      #Sickle cell pain crisis  Follows with Dr. Duncan. Has a L chest port for simple transfusions (last a few weeks ago) and exchanges as needed. Reports she is on oxycodone 10 mg up to 6x per day at home but usually only takes 3-4x daily.  Just hospitalized 11/27-12//24 with acute pain crisis complicated by asthma exacerbation.  Now presents with suspected vaginal  bleeding, increased back pain.  Admitting provider discussed PCA with patient, however patient felt her pain level did not warrant PCA.  Ongoing back pain, no chest pain or dyspnea, no other active pain complaints.  Feels IV Dilaudid with oral oxycodone as needed effective at this time.  Hemoglobin down at 6.4, mild leukocytosis stable.  Platelets normal.  T. bili 2.8, not far off baseline.  Eating and drinking well.  - continue PTA oxycodone 10 mg every 6 hours as needed  - continue IV dilaudid prn for breakthrough pain 1 mg q2h  - continue PTA methocarbamol  - continue PTA hydroxyurea  -Bowel regimen  -Daily CBC, CMP, reticulocyte count  -Consult with hematology prior to any PRBC transfusions  -Hematology consult appreciated    #Hemochromatosis   Ferritin 5942 on 12/6.  -deferiprone not on formulary, holding while admitted  -Consult with hematology prior to any PRBC transfusions     #Hx of recurrent PE  #Hx of CVA  #Contrast dye allergy  Not on AC, takes ASA 81 mg twice daily.  Has residual right arm flexion contracture, slight limp but otherwise independent and does not require assistive device to ambulate.  Left handed.  Recently hospitalized with acute hypoxemic respiratory failure ultimately attributed to acute asthma exacerbation, subsequently resolved. Bilateral doppler US negative for DVT 11/27, and V/Q scan negative for PE 11/29.  Denies any chest pain or dyspnea, oxygenating well on room air.  Lungs CTA on exam.  -Resume PTA ASA 81 mg twice daily    #Asthma with recent acute exacerbation  Currently compensated.  Recent VQ scan negative.  No active cough or dyspnea.  Oxygenating well on room air.  Lungs CTA on exam.  - continue PTA Symbicort with albuterol nebs as needed  - outpatient f/u with pulmonology     #Leukocytosis   Mild, likely stress reaction.  No fevers or chills.  UA bland.  No localizing signs or symptoms of infection.  -Daily CBC as above            Observation Goals: -diagnostic tests and  "consults completed and resulted, -vital signs normal or at patient baseline, -adequate pain control on oral analgesics, Nurse to notify provider when observation goals have been met and patient is ready for discharge.  Diet: Regular Diet Adult    DVT Prophylaxis: ambulating, ASA 81 mg bid   Lopez Catheter: Not present  Lines: PRESENT      Port a Cath 11/25/24 Single Lumen Left Chest wall-Site Assessment: WDL      Cardiac Monitoring: None  Code Status: Full Code      Clinically Significant Risk Factors Present on Admission          # Hyperchloremia: Highest Cl = 109 mmol/L in last 2 days, will monitor as appropriate      # Hypocalcemia: Lowest Ca = 8.4 mg/dL in last 2 days, will monitor and replace as appropriate       # Drug Induced Platelet Defect: home medication list includes an antiplatelet medication        # Anemia: based on hgb <11       # Overweight: Estimated body mass index is 27.31 kg/m  as calculated from the following:    Height as of this encounter: 1.626 m (5' 4\").    Weight as of this encounter: 72.2 kg (159 lb 1.6 oz).       # Financial/Environmental Concerns:    # Asthma: noted on problem list        Social Drivers of Health    Housing Stability: High Risk (11/28/2024)    Housing Stability     Do you have housing? : No     Are you worried about losing your housing?: No   Physical Activity: Unknown (7/3/2024)    Exercise Vital Sign     Days of Exercise per Week: 1 day   Social Connections: Unknown (7/3/2024)    Social Connection and Isolation Panel [NHANES]     Frequency of Social Gatherings with Friends and Family: Never          Disposition Plan     Medically Ready for Discharge: Anticipated Tomorrow             Augusto Gracia MD  Hospitalist Service, GOLD TEAM 17  [facility]  Securely message with [employer] (more info)  Text page via [facility] Paging/Directory   See signed in provider for up to date coverage " information  ______________________________________________________________________    Interval History   States she presented because of increased back pain and vaginal bleeding.  Has residual right upper extremity weakness with very mild right lower extremity weakness from previous stroke, stable.  Ambulates with a slight limp but does not need assistive device.  Has had intermittent vaginal bleeding but no associated pain or other discharge.  No dysuria or urgency.  No fevers, chills or night sweats.  No chest pain or dyspnea.  Eating well.  Denies any nausea, vomiting, diarrhea or active abdominal pain.    Physical Exam   Vital Signs: Temp: 98.4  F (36.9  C) Temp src: Oral BP: 137/80 Pulse: 88   Resp: 16 SpO2: 92 % O2 Device: None (Room air)    Weight: 159 lbs 1.6 oz  General: Alert and oriented x 4.  Very pleasant.  Speech normal.  Affect appropriate.  Chest: CTA bilaterally  CV: RRR.  No murmurs.  Abdomen: Normal active bowel sounds.  Soft, nontender, nondistended.  Extremities: No edema.  Neuro: Mild right facial droop sparing forehead.  CN II through XII otherwise grossly intact.  Right distal upper extremity flexion contracture, proximal right arm strength okay.  Right lower extremity strength intact.  Normal left upper and lower extremity strength.      55 MINUTES SPENT BY ME on the date of service doing chart review, history, exam, documentation & further activities per the note.      Data   Imaging results reviewed over the past 24 hrs:   Recent Results (from the past 24 hours)   US Pelvis Cmplt w Transvag & Doppler LmtPel Duplex Limited    Narrative    EXAM: US PELVIS COMPLETE W TRANSVAGINAL AND DOPPLER LIMITED  LOCATION: [facility]  DATE: 12/6/2024    INDICATION: Pelvic pain, vaginal bleeding with clots  COMPARISON: None.  TECHNIQUE: Transabdominal scans were performed. Endovaginal ultrasound was performed to better visualize the adnexa. Color flow with  spectral Doppler and waveform analysis performed.    FINDINGS:    UTERUS: 5.5 x 2.4 x 3.5 cm. Normal in size and position with no masses.    ENDOMETRIUM: 5-6 mm. Normal smooth endometrium.    RIGHT OVARY: 3.3 x 1.9 x 2.2 cm. Normal with arterial and venous duplex flow identified. Anechoic avascular dominant follicle measuring 1.9 cm.    LEFT OVARY: Not well seen due to bowel gas.    No significant free fluid.      Impression    IMPRESSION:    1.  Normal appearance of the uterus and endometrium.  2.  Dominant follicle within an otherwise normal-appearing right ovary.  3.  The left ovary is not well seen due to anatomic location and bowel gas.         US Renal Complete Non-Vascular    Narrative    EXAM: US RENAL COMPLETE NON-VASCULAR  LOCATION: [facility]  DATE: 12/7/2024    INDICATION: hematuria  COMPARISON: None.  TECHNIQUE: Routine Bilateral Renal and Bladder Ultrasound.    FINDINGS:    RIGHT KIDNEY: 11.3 cm. Normal without hydronephrosis or masses.     LEFT KIDNEY: 10.8 cm. Normal without hydronephrosis or masses.     BLADDER: Urinary bladder is partially distended and segmentally seen. No bladder abnormalities demonstrated. Bilateral ureters jets demonstrated.      Impression    IMPRESSION:  1.  Normal kidney ultrasound.       Most Recent 3 CBC's:  Recent Labs   Lab Test 12/07/24  0848 12/07/24  0039 12/06/24 2129 12/05/24  0947   WBC 11.2*  --  11.2* 10.8   HGB 6.4* 6.5* 6.9* 8.0*   MCV 87  --  86 84     --  404 429     Most Recent 3 BMP's:  Recent Labs   Lab Test 12/07/24  0848 12/06/24 2129 12/05/24  0947    140 138   POTASSIUM 3.9 3.9 3.9   CHLORIDE 109* 105 102   CO2 25 25 24   BUN 7.0 6.0 6.9   CR 0.63 0.71 0.53   ANIONGAP 8 10 12   MICAH 8.4* 8.7* 9.4   GLC 93 97 116*     Most Recent 2 LFT's:  Recent Labs   Lab Test 12/07/24  0848 12/06/24 2129   AST 65* 78*   ALT 75* 86*   ALKPHOS 52 57   BILITOTAL 2.8* 2.8*     Most Recent 3 INR's:  Recent Labs    Lab Test 10/24/24  2301 05/17/24  0658 05/16/24  1218   INR 1.14 1.21* 1.29*     Most Recent 3 Troponin's:  Recent Labs   Lab Test 11/10/21  1943 11/10/21  1109 09/17/21  0512 08/07/21  0049 06/12/21  0149 04/29/21  0344 04/28/21  1850   TROPI  --   --   --   --  <0.015 <0.015 <0.015   TROPONIN <0.015 <0.015 <0.015   < >  --   --   --     < > = values in this interval not displayed.     Most Recent 6 Bacteria Isolates From Any Culture (See EPIC Reports for Culture Details):  Recent Labs   Lab Test 06/06/21  0430 05/07/21  1030 05/07/21  0905 04/29/21  2205 04/29/21  2049 04/28/21  1858   CULT No growth No growth No growth No growth No growth No growth     Most Recent TSH and T4:  Recent Labs   Lab Test 04/23/24  1754   TSH 0.60     Most Recent Hemoglobin A1c:No lab results found.  Most Recent Urinalysis:  Recent Labs   Lab Test 12/06/24 2213   COLOR Yellow   APPEARANCE Clear   URINEGLC Negative   URINEBILI Negative   URINEKETONE Negative   SG 1.013   UBLD Negative   URINEPH 6.5   PROTEIN Negative   NITRITE Negative   LEUKEST Negative   RBCU 1   WBCU 1     Most Recent ESR & CRP:  Recent Labs   Lab Test 04/23/24  1754 05/10/23  1948 08/29/22  1130 07/10/21  0339 05/10/21  0442   SED  --   --  21*   < >  --    CRP  --   --   --   --  76.0*   CRPI <3.00   < >  --   --   --     < > = values in this interval not displayed.     Most Recent Anemia Panel:  Recent Labs   Lab Test 12/07/24  0848 12/07/24  0039 12/06/24 2129 09/05/22  0535 08/29/22  1130   WBC 11.2*  --  11.2*   < > 14.8*   HGB 6.4*   < > 6.9*   < > 7.7*   HCT 18.2*   < > 19.3*   < > 21.3*   MCV 87  --  86   < > 87     --  404   < > 390   IRON  --   --  160*  --  154   IRONSAT  --   --   --   --  85*   RETICABSCT  --   --  0.413*   < >  --    RETP  --   --  18.4*   < >  --    FEB  --   --   --   --  181*   SALMA  --   --  5,942*   < > 5,635*    < > = values in this interval not displayed.

## 2024-12-07 NOTE — CONSULTS
Gynecology Consult Note    Consulting Provider: Dr Anaya   Consulting Service: ED  Reason for Consult: vaginal bleeding    HPI:   Jennifer Cervantes is a 23 yo G0 who presents to the emergency department with sickle cell pain crisis and vaginal bleeding.     History is otherwise notable for recurrent PE, asthma, h/o CVA w residual R sided deficits.     She was recently admitted from 11/27-12/4 with sickle cell pain crisis after presenting with chest pain and ORTEGA. She was hypoxic on arrival requiring supplemental oxygen. She has a contrast allergy and so was evaluated with VQ scan which was negative for PE. During her admission she was started on lovenox and her pain medications were titrated. Her baseline hgb is ~7 and was stable at 8 on the day of discharge. Her lovenox was not continued after she left the hospital.     Today, pt reports that since her discharge she has been having significant, diffuse abdominal pain. It is located sometimes on the right side, sometimes on the left, and sometimes all over. Has had 3 episodes of vomiting since her recent discharge related to this discomfort. Denies other infectious symptoms like fevers or chills.     She also endorses bothersome vaginal bleeding which has been ongoing x2 weeks. She has a nexplanon which per chart review has been in place since 5/2023. She states that since the Nexplanon was placed, she does have irregular vaginal bleeding which is typically light and lasts only 2-3 days at a time. Her current bleeding episode is a bit heavier and has been ongoing much longer. She reports she used 5 tampons in the last day none of which were fully saturated. She denies passage of clots. She reports that on multiple trips to the bathroom over the past few days she has noticed no bleeding at all.  Many years ago when she had bothersome bleeding she was prescribed a medication that made the bleeding stop and is wondering about getting this medication again; states this was  before she had a nexpanon in place.    She is not currently sexually active, states she has not been for 2-3 years. She has never been pregnant. She denies any history of procedures on her uterus or cervix. Had her gallbladder removed but no other abdominal surgeries in the past.     ROS:   Negative except as per HPI    OB History:   G0    GYN History:   Nexplanon in place  H/o chlamydia (2020) s/p tx    PMH:   Past Medical History:   Diagnosis Date    Anxiety     Bleeding disorder (H)     Blood clotting disorder (H)     Cerebral infarction (H) 2015    Cognitive developmental delay     low IQ. 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 side (H)     right hand contractures    Iron overload due to repeated red blood cell transfusions     Migraines     Multiple subsegmental pulmonary emboli without acute cor pulmonale (H) 02/01/2021    Oppositional defiant behavior     Presence of intrauterine contraceptive device 2/18/2020    Superficial venous thrombosis of arm, right 03/25/2021    Uncomplicated asthma        PSHx:   Past Surgical History:   Procedure Laterality Date    AS INSERT TUNNELED CV 2 CATH W/O PORT/PUMP      CHOLECYSTECTOMY      CV RIGHT HEART CATH MEASUREMENTS RECORDED N/A 11/18/2021    Procedure: Right Heart Cath;  Surgeon: Jackson Stauffer MD;  Location:  HEART CARDIAC CATH LAB    INSERT PORT VASCULAR ACCESS Left 4/21/2021    Procedure: INSERTION, VASCULAR ACCESS PORT (NOT SURE ON SIDE UNTIL REMOVAL);  Surgeon: Rajan More MD;  Location: [facility] OR    IR CHEST PORT PLACEMENT > 5 YRS OF AGE  4/21/2021    IR CVC NON TUNNEL LINE REMOVAL  5/6/2021    IR CVC NON TUNNEL PLACEMENT > 5 YRS  04/07/2020    IR CVC NON TUNNEL PLACEMENT > 5 YRS  4/30/2021    IR CVC NON TUNNEL PLACEMENT > 5 YRS  9/7/2022    IR PORT REMOVAL LEFT  4/21/2021    REMOVE PORT VASCULAR ACCESS Left 4/21/2021     Procedure: REMOVAL, VASCULAR ACCESS PORT LEFT;  Surgeon: Rajan More MD;  Location: [facility] OR    REPAIR TENDON ELBOW Right 10/02/2019    Procedure: Right Elbow Flexor Lengthening, Flexor Pronator Slide Of Wrist and Finger, Thumb Adductor Lengthening;  Surgeon: Anai Franco MD;  Location: [facility] OR    TONSILLECTOMY Bilateral 10/02/2019    Procedure: Bilateral Tonsillectomy;  Surgeon: Farhana Guy MD;  Location: [facility] OR    [facility] BREAST REDUCTION (INCLUDES LIPO) TIER 3 Bilateral 04/23/2019       Medications:   Current Outpatient Medications   Medication Instructions    albuterol (PROVENTIL) 5 mg, Nebulization, EVERY 6 HOURS PRN    aspirin (ASA) 81 mg, Oral, 2 TIMES DAILY    budesonide-formoterol (SYMBICORT) 160-4.5 MCG/ACT Inhaler Inhale 2 puffs twice daily plus 1-2 puffs as needed. May use up to 12 puffs per day.    deferasirox (JADENU) 1,440 mg, Oral, DAILY    Deferiprone (Twice Daily) 2,000 mg, Oral, 2 TIMES DAILY    diphenhydrAMINE (BENADRYL) 50 MG capsule Administer 12  hours pre - IV contrast injection and 2 hours prior to contrast. Patient must have a .    EPINEPHrine (ANY BX GENERIC EQUIV) 0.3 mg, Intramuscular, PRN    gabapentin (NEURONTIN) 300 mg, Oral, 3 TIMES DAILY, Take PO 1 pill in evening (300 mg) TID to start. If tolerated, increase by 300 mg in morning or lunch every few days, updating your doctor's office in time to get refills. The maximum dose is 900 mg TID.    hydroxyurea (HYDREA) 3,000 mg, Oral, DAILY    ibuprofen (ADVIL/MOTRIN) 800 mg, EVERY 8 HOURS PRN    methocarbamol (ROBAXIN) 750 mg, Oral, 4 TIMES DAILY PRN    methylPREDNISolone (MEDROL) 32 MG tablet Take 1 tab 12 hours before appointment and 1 tab 2 hours prior to the procedure with IV contrast.    naloxone (NARCAN) 4 mg, PRN    oxyCODONE IR (ROXICODONE) 10 mg, Oral, EVERY 6 HOURS PRN, Goal 4 per day. Max 6 per day.       Allergies:    Allergies   Allergen Reactions    Contrast Dye Angioedema     Hives and breathing  "issues    Fish-Derived Products Hives    Seafood Hives    Adhesive Tape Hives     Primipore dressing causes hives    Gadolinium     Iodinated Contrast Media        Social History:   Social History     Tobacco Use    Smoking status: Never     Passive exposure: Never    Smokeless tobacco: Never   Substance Use Topics    Alcohol use: Not Currently     Alcohol/week: 0.0 standard drinks of alcohol    Drug use: Never       Physical Exam:   Patient Vitals for the past 24 hrs:   BP Temp Temp src Pulse Resp SpO2 Height Weight   12/07/24 0228 121/80 98.1  F (36.7  C) Oral 85 16 95 % -- --   12/06/24 1957 115/76 98.5  F (36.9  C) Oral 100 14 96 % 1.626 m (5' 4\") 72.9 kg (160 lb 11.2 oz)     Gen:  Resting comfortably, NAD  CV:  Well perfused  Pulm:  NWOB on RA   Abd:  Soft, non-tender, non-distended  Ext:  Non-tender, trace  LE edema b/l  Gyn: Deferred given pt in hallway bed and SSE already completed by ED provider; see their note for documentation     Labs:   Latest Reference Range & Units 12/06/24 21:29 12/06/24 22:13   Sodium 135 - 145 mmol/L 140    Potassium 3.4 - 5.3 mmol/L 3.9    Chloride 98 - 107 mmol/L 105    Carbon Dioxide (CO2) 22 - 29 mmol/L 25    Urea Nitrogen 6.0 - 20.0 mg/dL 6.0    Creatinine 0.51 - 0.95 mg/dL 0.71    GFR Estimate >60 mL/min/1.73m2 >90    Calcium 8.8 - 10.4 mg/dL 8.7 (L)    Anion Gap 7 - 15 mmol/L 10    Albumin 3.5 - 5.2 g/dL 4.2    Protein Total 6.4 - 8.3 g/dL 7.0    Alkaline Phosphatase 40 - 150 U/L 57    ALT 0 - 50 U/L 86 (H)    AST 0 - 45 U/L 78 (H)    Bilirubin Total <=1.2 mg/dL 2.8 (H)    Ferritin 6 - 175 ng/mL 5,942 (H)    Glucose 70 - 99 mg/dL 97    HCG Qualitative Serum Negative  Negative    Lactate Dehydrogenase 0 - 250 U/L 428 (H)    WBC 4.0 - 11.0 10e3/uL 11.2 (H)    Hemoglobin 11.7 - 15.7 g/dL 6.9 (LL)    Hematocrit 35.0 - 47.0 % 19.3 (L)    Platelet Count 150 - 450 10e3/uL 404    RBC Count 3.80 - 5.20 10e6/uL 2.25 (L)    MCV 78 - 100 fL 86    MCH 26.5 - 33.0 pg 30.7    MCHC 31.5 - " 36.5 g/dL 35.8    RDW 10.0 - 15.0 % 22.5 (H)    % Neutrophils % 64    % Lymphocytes % 21    % Monocytes % 11    % Eosinophils % 3    % Basophils % 2    Absolute Basophils 0.0 - 0.2 10e3/uL 0.2    Absolute Eosinophils 0.0 - 0.7 10e3/uL 0.3    Absolute Immature Granulocytes <=0.4 10e3/uL 0.1    Absolute Lymphocytes 0.8 - 5.3 10e3/uL 2.3    Absolute Monocytes 0.0 - 1.3 10e3/uL 1.2    % Immature Granulocytes % 0    Absolute Neutrophils 1.6 - 8.3 10e3/uL 7.1    Absolute NRBCs 10e3/uL 0.2    NRBCs per 100 WBC <1 /100 2 (H)    % Retic 0.5 - 2.0 % 18.4 (H)    Absolute Retic 0.025 - 0.095 10e6/uL 0.413 (H)    Color Urine Colorless, Straw, Light Yellow, Yellow   Yellow   Appearance Urine Clear   Clear   Glucose Urine Negative mg/dL  Negative   Bilirubin Urine Negative   Negative   Ketones Urine Negative mg/dL  Negative   Specific Gravity Urine 1.003 - 1.035   1.013   pH Urine 5.0 - 7.0   6.5   Protein Albumin Urine Negative mg/dL  Negative   Urobilinogen mg/dL Normal, 2.0 mg/dL  >12.0 !   Nitrite Urine Negative   Negative   Blood Urine Negative   Negative   Leukocyte Esterase Urine Negative   Negative   WBC Urine <=5 /HPF  1   RBC Urine <=2 /HPF  1   Squamous Epithelial /HPF Urine <=1 /HPF  <1   (LL): Data is critically low  (L): Data is abnormally low  (H): Data is abnormally high  !: Data is abnormal    IMAGING  Pelvic US 12/6/24  IMPRESSION:    1.  Normal appearance of the uterus and endometrium.  2.  Dominant follicle within an otherwise normal-appearing right ovary.  3.  The left ovary is not well seen due to anatomic location and bowel gas.    A&P:   Jennifer Cervantes is a 23 yo G0 w history of sickle cell disease, recurrent PE, CVA w R sided deficits, asthma s/p recent exacerbation who presents to the emergency department with abdominal pain and vaginal bleeding. Recently hospitalized with hypoxia and treated with lovenox during PE r/o however hypoxia was ultimately attributed to a suspected asthma exacerbation and improved  with PO prednisone. She returns with abdominal pain x2 days and vaginal bleeding x2 weeks. Bleeding is not extremely heavy per her description (reports using 5 tampons per day which are not saturated when changed, no passage of clots). She is vitally normal in the ED. Labs notable for hgb 6.5 (baseline ~7) and pelvic US shows a 5 mm trilaminar endometrial stripe and no significant findings in the uterus. Speculum exam performed by ED provider with reportedly blood-tinged mucous discharge in the vault but no clots or active bleeding.     The bleeding she describes is consistent with irregular bleeding which is often experienced by patients using nexplanon and while bothersome does not seem dangerously heavy; low suspicion it is contributing to her abdominal pain or having significant impact on her hgb. May have been exacerbated by recent lovenox use. Bothersome, irregular bleeding with nexplanon in place can be treated with OCPs however given her h/o PE and CVA she is not a good candidate to receive estrogen containing contraception or TXA. Given her stripe of 5 mm, progesterone alone not likely to improve her current symptoms. Suspect bleeding will improve with time but if it continues to be bothersome, could consider transition from nexplanon to alternative, non estrogen-containing contraception (such as Mirena IUD) in the outpatient setting. Given the amount of bleeding and her stability on admission, would not recommend addition of other medication or any acute intervention at this time.     Gynecology will sign off but if there are concerns about heavier bleeding or its contribution to her current presentation we are happy to be involved.     Patient discussed with Dr Shaffer.    Kinza Suazo MD  OB/GYN PGY-3  12/7/2024 3:34 AM

## 2024-12-07 NOTE — ED TRIAGE NOTES
Pt currently having sickle cell pain, all over,  also states sharp in stomach right side; Pt states when she urinates she notices blood clots.     7/10 pain with increased in stomach.

## 2024-12-08 LAB
ALBUMIN SERPL BCG-MCNC: 4.1 G/DL (ref 3.5–5.2)
ALP SERPL-CCNC: 58 U/L (ref 40–150)
ALT SERPL W P-5'-P-CCNC: 65 U/L (ref 0–50)
ANION GAP SERPL CALCULATED.3IONS-SCNC: 10 MMOL/L (ref 7–15)
AST SERPL W P-5'-P-CCNC: 52 U/L (ref 0–45)
BILIRUB SERPL-MCNC: 3 MG/DL
BUN SERPL-MCNC: 4.4 MG/DL (ref 6–20)
CALCIUM SERPL-MCNC: 8.8 MG/DL (ref 8.8–10.4)
CHLORIDE SERPL-SCNC: 105 MMOL/L (ref 98–107)
CREAT SERPL-MCNC: 0.51 MG/DL (ref 0.51–0.95)
EGFRCR SERPLBLD CKD-EPI 2021: >90 ML/MIN/1.73M2
ERYTHROCYTE [DISTWIDTH] IN BLOOD BY AUTOMATED COUNT: 23.6 % (ref 10–15)
FRAGMENTS BLD QL SMEAR: ABNORMAL
GLUCOSE SERPL-MCNC: 88 MG/DL (ref 70–99)
HCO3 SERPL-SCNC: 25 MMOL/L (ref 22–29)
HCT VFR BLD AUTO: 19.5 % (ref 35–47)
HGB BLD-MCNC: 6.8 G/DL (ref 11.7–15.7)
MCH RBC QN AUTO: 30.1 PG (ref 26.5–33)
MCHC RBC AUTO-ENTMCNC: 34.9 G/DL (ref 31.5–36.5)
MCV RBC AUTO: 86 FL (ref 78–100)
PLAT MORPH BLD: ABNORMAL
PLATELET # BLD AUTO: 410 10E3/UL (ref 150–450)
POLYCHROMASIA BLD QL SMEAR: ABNORMAL
POTASSIUM SERPL-SCNC: 3.8 MMOL/L (ref 3.4–5.3)
PROT SERPL-MCNC: 6.9 G/DL (ref 6.4–8.3)
RBC # BLD AUTO: 2.26 10E6/UL (ref 3.8–5.2)
RBC MORPH BLD: ABNORMAL
RETICS # AUTO: 0.54 10E6/UL (ref 0.03–0.1)
RETICS/RBC NFR AUTO: 24.1 % (ref 0.5–2)
SICKLE CELLS BLD QL SMEAR: ABNORMAL
SODIUM SERPL-SCNC: 140 MMOL/L (ref 135–145)
TARGETS BLD QL SMEAR: SLIGHT
WBC # BLD AUTO: 12.6 10E3/UL (ref 4–11)

## 2024-12-08 PROCEDURE — 85045 AUTOMATED RETICULOCYTE COUNT: CPT | Performed by: INTERNAL MEDICINE

## 2024-12-08 PROCEDURE — 250N000013 HC RX MED GY IP 250 OP 250 PS 637: Performed by: INTERNAL MEDICINE

## 2024-12-08 PROCEDURE — 120N000002 HC R&B MED SURG/OB UMMC

## 2024-12-08 PROCEDURE — 36415 COLL VENOUS BLD VENIPUNCTURE: CPT | Performed by: INTERNAL MEDICINE

## 2024-12-08 PROCEDURE — 250N000013 HC RX MED GY IP 250 OP 250 PS 637: Performed by: PEDIATRICS

## 2024-12-08 PROCEDURE — 99232 SBSQ HOSP IP/OBS MODERATE 35: CPT | Performed by: INTERNAL MEDICINE

## 2024-12-08 PROCEDURE — 85027 COMPLETE CBC AUTOMATED: CPT | Performed by: INTERNAL MEDICINE

## 2024-12-08 PROCEDURE — 250N000011 HC RX IP 250 OP 636: Performed by: INTERNAL MEDICINE

## 2024-12-08 PROCEDURE — 80053 COMPREHEN METABOLIC PANEL: CPT | Performed by: INTERNAL MEDICINE

## 2024-12-08 PROCEDURE — 250N000011 HC RX IP 250 OP 636: Performed by: PEDIATRICS

## 2024-12-08 RX ORDER — HEPARIN SODIUM,PORCINE 10 UNIT/ML
5-10 VIAL (ML) INTRAVENOUS EVERY 24 HOURS
Status: DISCONTINUED | OUTPATIENT
Start: 2024-12-08 | End: 2024-12-10 | Stop reason: HOSPADM

## 2024-12-08 RX ORDER — PANTOPRAZOLE SODIUM 40 MG/1
40 TABLET, DELAYED RELEASE ORAL
Status: DISCONTINUED | OUTPATIENT
Start: 2024-12-08 | End: 2024-12-10 | Stop reason: HOSPADM

## 2024-12-08 RX ORDER — IBUPROFEN 800 MG/1
800 TABLET, FILM COATED ORAL EVERY 8 HOURS PRN
Status: DISCONTINUED | OUTPATIENT
Start: 2024-12-08 | End: 2024-12-10 | Stop reason: HOSPADM

## 2024-12-08 RX ORDER — HEPARIN SODIUM,PORCINE 10 UNIT/ML
5-10 VIAL (ML) INTRAVENOUS
Status: DISCONTINUED | OUTPATIENT
Start: 2024-12-08 | End: 2024-12-10 | Stop reason: HOSPADM

## 2024-12-08 RX ORDER — HEPARIN SODIUM (PORCINE) LOCK FLUSH IV SOLN 100 UNIT/ML 100 UNIT/ML
5-10 SOLUTION INTRAVENOUS
Status: DISCONTINUED | OUTPATIENT
Start: 2024-12-08 | End: 2024-12-10 | Stop reason: HOSPADM

## 2024-12-08 RX ADMIN — IBUPROFEN 800 MG: 800 TABLET, FILM COATED ORAL at 17:47

## 2024-12-08 RX ADMIN — SENNOSIDES AND DOCUSATE SODIUM 2 TABLET: 50; 8.6 TABLET ORAL at 21:22

## 2024-12-08 RX ADMIN — HYDROMORPHONE HYDROCHLORIDE 1 MG: 1 INJECTION, SOLUTION INTRAMUSCULAR; INTRAVENOUS; SUBCUTANEOUS at 21:22

## 2024-12-08 RX ADMIN — SENNOSIDES AND DOCUSATE SODIUM 2 TABLET: 50; 8.6 TABLET ORAL at 08:43

## 2024-12-08 RX ADMIN — HYDROXYUREA 3000 MG: 500 CAPSULE ORAL at 08:44

## 2024-12-08 RX ADMIN — HYDROMORPHONE HYDROCHLORIDE 1 MG: 1 INJECTION, SOLUTION INTRAMUSCULAR; INTRAVENOUS; SUBCUTANEOUS at 04:26

## 2024-12-08 RX ADMIN — HYDROMORPHONE HYDROCHLORIDE 1 MG: 1 INJECTION, SOLUTION INTRAMUSCULAR; INTRAVENOUS; SUBCUTANEOUS at 00:43

## 2024-12-08 RX ADMIN — HYDROMORPHONE HYDROCHLORIDE 1 MG: 1 INJECTION, SOLUTION INTRAMUSCULAR; INTRAVENOUS; SUBCUTANEOUS at 14:27

## 2024-12-08 RX ADMIN — HEPARIN, PORCINE (PF) 10 UNIT/ML INTRAVENOUS SYRINGE 5 ML: at 14:26

## 2024-12-08 RX ADMIN — GABAPENTIN 600 MG: 300 CAPSULE ORAL at 21:22

## 2024-12-08 RX ADMIN — HEPARIN 5 ML: 100 SYRINGE at 21:28

## 2024-12-08 RX ADMIN — ONDANSETRON 4 MG: 2 INJECTION INTRAMUSCULAR; INTRAVENOUS at 09:43

## 2024-12-08 RX ADMIN — HYDROMORPHONE HYDROCHLORIDE 1 MG: 1 INJECTION, SOLUTION INTRAMUSCULAR; INTRAVENOUS; SUBCUTANEOUS at 12:13

## 2024-12-08 RX ADMIN — HYDROMORPHONE HYDROCHLORIDE 1 MG: 1 INJECTION, SOLUTION INTRAMUSCULAR; INTRAVENOUS; SUBCUTANEOUS at 19:07

## 2024-12-08 RX ADMIN — DEFERASIROX 1440 MG: 360 TABLET, FILM COATED ORAL at 11:10

## 2024-12-08 RX ADMIN — PANTOPRAZOLE SODIUM 40 MG: 40 TABLET, DELAYED RELEASE ORAL at 17:47

## 2024-12-08 RX ADMIN — ASPIRIN 81 MG CHEWABLE TABLET 81 MG: 81 TABLET CHEWABLE at 21:22

## 2024-12-08 RX ADMIN — ASPIRIN 81 MG CHEWABLE TABLET 81 MG: 81 TABLET CHEWABLE at 08:44

## 2024-12-08 RX ADMIN — HYDROMORPHONE HYDROCHLORIDE 1 MG: 1 INJECTION, SOLUTION INTRAMUSCULAR; INTRAVENOUS; SUBCUTANEOUS at 16:45

## 2024-12-08 RX ADMIN — HYDROMORPHONE HYDROCHLORIDE 1 MG: 1 INJECTION, SOLUTION INTRAMUSCULAR; INTRAVENOUS; SUBCUTANEOUS at 06:37

## 2024-12-08 RX ADMIN — HYDROMORPHONE HYDROCHLORIDE 1 MG: 1 INJECTION, SOLUTION INTRAMUSCULAR; INTRAVENOUS; SUBCUTANEOUS at 23:46

## 2024-12-08 RX ADMIN — HYDROMORPHONE HYDROCHLORIDE 1 MG: 1 INJECTION, SOLUTION INTRAMUSCULAR; INTRAVENOUS; SUBCUTANEOUS at 08:44

## 2024-12-08 ASSESSMENT — ACTIVITIES OF DAILY LIVING (ADL)
ADLS_ACUITY_SCORE: 35

## 2024-12-08 NOTE — PLAN OF CARE
Goal Outcome Evaluation:       Plan of Care Reviewed With: patient     Overall Patient Progress: improving     Outcome Evaluation:   O2: RA   Output: Continent of B/B   Last BM: Before admission   Activity: Ind   Skin: Intact   Pain: PRN IV dilaudid Q2h   CMS: A/O x4 no numbness/tingling   Dressing:    Diet: Reg   LDA: L Port, assessed   Equipment:    Plan: Continue with POC   Additional Info:

## 2024-12-08 NOTE — PROGRESS NOTES
[facility]    Medicine Progress Note - Hospitalist Service, GOLD TEAM 17    Date of Admission:  12/6/2024    Assessment & Plan   Jennifer Cervantes is a 24 year old F with PMH of PE, not on AC, SCD c/b CVA who presented with acute hematuria vs vaginal bleeding and acute worsening of back pain consistent with sickle cell pain crisis.  Had just been hospitalized 11/27-12/4/2024 for sickle cell pain crisis complicated by acute asthma exacerbation with hypoxemic respiratory failure which had resolved.         #Suspected vaginal bleeding:  Pelvic and renal ultrasounds normal.  UA bland, no RBCs. Seen by OB/GYN 12/7/24, felt her bleeding was consistent with a regular bleeding often experience by patient's using Nexplanon, and her bleeding is not felt to be excessively heavy based on her history.  Denies dysuria.  No fevers or chills.  No active abdominal pain.  Eating and drinking well now.  -Per OB/GYN, can be treated with OCPs, however given history of PE and CVA not a good candidate for estrogen containing contraceptive or TXA.  May consider transition from Nexplanon to alternative, nonestrogen containing contraceptive (such as Mirena IUD) in the outpatient setting.     #Elevated transaminases:  Mildly elevated transaminases of unclear etiology.  Alkaline phos normal.  Mildly elevated total bilirubin not unexpected with sickle cell crisis.  No active symptoms, abdominal exam benign.  Eating and drinking well.  Transaminases normalizing.   -Monitor daily CMP while in hospital  -Monitor clinically, if transaminases not continuing to normalize will consider abdominal ultrasound      #Sickle cell pain crisis  Follows with Dr. Duncan. Has a L chest port for simple transfusions (last a few weeks ago) and exchanges as needed. Reports she is on oxycodone 10 mg up to 6x per day at home but usually only takes 3-4x daily.  Just hospitalized 11/27-12//24 with acute pain crisis complicated  by asthma exacerbation.  Now presents with suspected vaginal bleeding, increased back pain.  Admitting provider discussed PCA with patient, however patient felt her pain level did not warrant PCA.  Ongoing back pain, no chest pain or dyspnea, no other active pain complaints.  Feels IV Dilaudid with oral oxycodone as needed effective at this time.  Platelets normal.  Hemoglobin had trended down to 6.4, trending back up to 6.8.  T. bili elevated, not far off baseline.  Eating and drinking well.  - continue PTA oxycodone 10 mg every 6 hours as needed  - continue IV dilaudid prn for breakthrough pain 1 mg q2h  - continue PTA methocarbamol  - continue PTA hydroxyurea  -Bowel regimen  -Daily CBC, CMP, reticulocyte count  -Consult with hematology prior to any PRBC transfusions  -Hematology consult appreciated    #Hemochromatosis   Ferritin 5942 on 12/6.  -deferiprone not on formulary, holding while admitted  -Consult with hematology prior to any PRBC transfusions     #Hx of recurrent PE  #Hx of CVA  #Contrast dye allergy  Not on AC, takes ASA 81 mg twice daily.  Has residual right arm flexion contracture, slight limp but otherwise independent and does not require assistive device to ambulate.  Left handed.  Recently hospitalized with acute hypoxemic respiratory failure ultimately attributed to acute asthma exacerbation, subsequently resolved. Bilateral doppler US negative for DVT 11/27, and V/Q scan negative for PE 11/29.  Denies any chest pain or dyspnea, oxygenating well on room air.  Lungs CTA on exam.  -Resumed PTA ASA 81 mg twice daily    #Asthma with recent acute exacerbation  Currently compensated.  Recent VQ scan negative.  No active cough or dyspnea.  Oxygenating well on room air.  Lungs CTA on exam.  - continue PTA Symbicort with albuterol nebs as needed  - outpatient f/u with pulmonology     #Leukocytosis   Mild, likely stress reaction.  No fevers or chills.  UA bland.  No localizing signs or symptoms of  "infection.  -Daily CBC as above    #Migraines:  Patient reports history of migraines, having a migraine 12/8.  Requests her PTA ibuprofen be resumed.  -Resume PTA ibuprofen 800 mg 3 times daily as needed for migraines, added PPI prophylaxis               Diet: Regular Diet Adult    DVT Prophylaxis: ambulating, ASA 81 mg bid   Lopez Catheter: Not present  Lines: PRESENT             Cardiac Monitoring: None  Code Status: Full Code      Clinically Significant Risk Factors          # Hyperchloremia: Highest Cl = 109 mmol/L in last 2 days, will monitor as appropriate      # Hypocalcemia: Lowest Ca = 8.4 mg/dL in last 2 days, will monitor and replace as appropriate                      # Overweight: Estimated body mass index is 27.31 kg/m  as calculated from the following:    Height as of this encounter: 1.626 m (5' 4\").    Weight as of this encounter: 72.2 kg (159 lb 1.6 oz)., PRESENT ON ADMISSION       # Financial/Environmental Concerns:    # Asthma: noted on problem list        Social Drivers of Health    Housing Stability: Low Risk  (12/7/2024)    Housing Stability     Do you have housing? : Yes     Are you worried about losing your housing?: No   Recent Concern: Housing Stability - High Risk (11/28/2024)    Housing Stability     Do you have housing? : No     Are you worried about losing your housing?: No   Physical Activity: Unknown (7/3/2024)    Exercise Vital Sign     Days of Exercise per Week: 1 day   Social Connections: Unknown (7/3/2024)    Social Connection and Isolation Panel [NHANES]     Frequency of Social Gatherings with Friends and Family: Never          Disposition Plan     Medically Ready for Discharge: Anticipated Tomorrow             Augusto Gracia MD  Hospitalist Service, GOLD TEAM [address] [place] [address]  Securely message with [employer] (more info)  Text page via [employer] Paging/Directory   See signed in provider for up to date coverage " information  ______________________________________________________________________    Interval History   When seen earlier in the day felt her pain was overall stable, felt perhaps she would be able to go home later in the day but subsequently developed a migraine.  Eating well.  No vaginal bleeding today.  Back pain stable.  No dysuria or urgency.  Ambulating without difficulty.  Denies any chest pain, cough or dyspnea.  No fevers, chills or night sweats.    Physical Exam   Vital Signs: Temp: 98.1  F (36.7  C) Temp src: Oral BP: 117/80 Pulse: 88   Resp: 16 SpO2: 90 % O2 Device: None (Room air)    Weight: 159 lbs 1.6 oz  General: Alert and oriented x 4.  Very pleasant.  Speech normal.  Affect appropriate.  Chest: CTA bilaterally.  No wheezes  CV: RRR.  No murmurs.  Abdomen: Normal active bowel sounds.  Soft, nontender, nondistended.  Extremities: No edema.  Neuro: Mild right facial droop sparing forehead.  CN II through XII otherwise grossly intact.  Right distal upper extremity flexion contracture, proximal right arm strength okay.  Right lower extremity strength intact.  Normal left upper and lower extremity strength.        Data   Imaging results reviewed over the past 24 hrs:   No results found for this or any previous visit (from the past 24 hours).    Most Recent 3 CBC's:  Recent Labs   Lab Test 12/08/24  1231 12/07/24  0848 12/07/24  0039 12/06/24 2129   WBC 12.6* 11.2*  --  11.2*   HGB 6.8* 6.4* 6.5* 6.9*   MCV 86 87  --  86    417  --  404     Most Recent 3 BMP's:  Recent Labs   Lab Test 12/08/24  1231 12/07/24  0848 12/06/24 2129    142 140   POTASSIUM 3.8 3.9 3.9   CHLORIDE 105 109* 105   CO2 25 25 25   BUN 4.4* 7.0 6.0   CR 0.51 0.63 0.71   ANIONGAP 10 8 10   MICAH 8.8 8.4* 8.7*   GLC 88 93 97     Most Recent 2 LFT's:  Recent Labs   Lab Test 12/08/24  1231 12/07/24  0848   AST 52* 65*   ALT 65* 75*   ALKPHOS 58 52   BILITOTAL 3.0* 2.8*     Most Recent 3 INR's:  Recent Labs   Lab Test  10/24/24  2301 05/17/24  0658 05/16/24  1218   INR 1.14 1.21* 1.29*     Most Recent 3 Troponin's:  Recent Labs   Lab Test 11/10/21  1943 11/10/21  1109 09/17/21  0512 08/07/21  0049 06/12/21  0149 04/29/21  0344 04/28/21  1850   TROPI  --   --   --   --  <0.015 <0.015 <0.015   TROPONIN <0.015 <0.015 <0.015   < >  --   --   --     < > = values in this interval not displayed.     Most Recent 6 Bacteria Isolates From Any Culture (See EPIC Reports for Culture Details):  Recent Labs   Lab Test 06/06/21  0430 05/07/21  1030 05/07/21  0905 04/29/21  2205 04/29/21  2049 04/28/21  1858   CULT No growth No growth No growth No growth No growth No growth     Most Recent TSH and T4:  Recent Labs   Lab Test 04/23/24  1754   TSH 0.60     Most Recent Hemoglobin A1c:No lab results found.  Most Recent Urinalysis:  Recent Labs   Lab Test 12/06/24 2213   COLOR Yellow   APPEARANCE Clear   URINEGLC Negative   URINEBILI Negative   URINEKETONE Negative   SG 1.013   UBLD Negative   URINEPH 6.5   PROTEIN Negative   NITRITE Negative   LEUKEST Negative   RBCU 1   WBCU 1     Most Recent ESR & CRP:  Recent Labs   Lab Test 04/23/24  1754 05/10/23  1948 08/29/22  1130 07/10/21  0339 05/10/21  0442   SED  --   --  21*   < >  --    CRP  --   --   --   --  76.0*   CRPI <3.00   < >  --   --   --     < > = values in this interval not displayed.     Most Recent Anemia Panel:  Recent Labs   Lab Test 12/08/24  1231 12/07/24  0039 12/06/24 2129 09/05/22  0535 08/29/22  1130   WBC 12.6*   < > 11.2*   < > 14.8*   HGB 6.8*   < > 6.9*   < > 7.7*   HCT 19.5*   < > 19.3*   < > 21.3*   MCV 86   < > 86   < > 87      < > 404   < > 390   IRON  --   --  160*  --  154   IRONSAT  --   --   --   --  85*   RETICABSCT 0.544*  --  0.413*   < >  --    RETP 24.1*  --  18.4*   < >  --    FEB  --   --   --   --  181*   SALMA  --   --  5,942*   < > 5,635*    < > = values in this interval not displayed.

## 2024-12-09 LAB
ALBUMIN SERPL BCG-MCNC: 4 G/DL (ref 3.5–5.2)
ALP SERPL-CCNC: 59 U/L (ref 40–150)
ALT SERPL W P-5'-P-CCNC: 56 U/L (ref 0–50)
ANION GAP SERPL CALCULATED.3IONS-SCNC: 10 MMOL/L (ref 7–15)
AST SERPL W P-5'-P-CCNC: 53 U/L (ref 0–45)
BILIRUB SERPL-MCNC: 3.4 MG/DL
BUN SERPL-MCNC: 5 MG/DL (ref 6–20)
CALCIUM SERPL-MCNC: 8.8 MG/DL (ref 8.8–10.4)
CHLORIDE SERPL-SCNC: 102 MMOL/L (ref 98–107)
CREAT SERPL-MCNC: 0.55 MG/DL (ref 0.51–0.95)
EGFRCR SERPLBLD CKD-EPI 2021: >90 ML/MIN/1.73M2
ERYTHROCYTE [DISTWIDTH] IN BLOOD BY AUTOMATED COUNT: 24.4 % (ref 10–15)
GLUCOSE SERPL-MCNC: 79 MG/DL (ref 70–99)
HCO3 SERPL-SCNC: 24 MMOL/L (ref 22–29)
HCT VFR BLD AUTO: 20.2 % (ref 35–47)
HGB BLD-MCNC: 7 G/DL (ref 11.7–15.7)
HOLD SPECIMEN: NORMAL
MCH RBC QN AUTO: 30.7 PG (ref 26.5–33)
MCHC RBC AUTO-ENTMCNC: 34.7 G/DL (ref 31.5–36.5)
MCV RBC AUTO: 89 FL (ref 78–100)
PLATELET # BLD AUTO: 362 10E3/UL (ref 150–450)
POTASSIUM SERPL-SCNC: 4.1 MMOL/L (ref 3.4–5.3)
PROT SERPL-MCNC: 7.2 G/DL (ref 6.4–8.3)
RBC # BLD AUTO: 2.28 10E6/UL (ref 3.8–5.2)
RETICS # AUTO: 0.59 10E6/UL (ref 0.03–0.1)
RETICS/RBC NFR AUTO: 25.3 % (ref 0.5–2)
SODIUM SERPL-SCNC: 136 MMOL/L (ref 135–145)
WBC # BLD AUTO: 13.3 10E3/UL (ref 4–11)

## 2024-12-09 PROCEDURE — 84155 ASSAY OF PROTEIN SERUM: CPT | Performed by: INTERNAL MEDICINE

## 2024-12-09 PROCEDURE — 250N000013 HC RX MED GY IP 250 OP 250 PS 637: Performed by: PEDIATRICS

## 2024-12-09 PROCEDURE — 82247 BILIRUBIN TOTAL: CPT | Performed by: INTERNAL MEDICINE

## 2024-12-09 PROCEDURE — 250N000011 HC RX IP 250 OP 636: Performed by: INTERNAL MEDICINE

## 2024-12-09 PROCEDURE — 120N000002 HC R&B MED SURG/OB UMMC

## 2024-12-09 PROCEDURE — 85041 AUTOMATED RBC COUNT: CPT | Performed by: INTERNAL MEDICINE

## 2024-12-09 PROCEDURE — 82435 ASSAY OF BLOOD CHLORIDE: CPT | Performed by: INTERNAL MEDICINE

## 2024-12-09 PROCEDURE — 99232 SBSQ HOSP IP/OBS MODERATE 35: CPT | Performed by: INTERNAL MEDICINE

## 2024-12-09 PROCEDURE — 250N000013 HC RX MED GY IP 250 OP 250 PS 637: Performed by: INTERNAL MEDICINE

## 2024-12-09 PROCEDURE — 250N000011 HC RX IP 250 OP 636: Performed by: PEDIATRICS

## 2024-12-09 PROCEDURE — 85045 AUTOMATED RETICULOCYTE COUNT: CPT | Performed by: INTERNAL MEDICINE

## 2024-12-09 PROCEDURE — 36592 COLLECT BLOOD FROM PICC: CPT | Performed by: INTERNAL MEDICINE

## 2024-12-09 PROCEDURE — 85018 HEMOGLOBIN: CPT | Performed by: INTERNAL MEDICINE

## 2024-12-09 RX ADMIN — DEFERASIROX 1440 MG: 360 TABLET, FILM COATED ORAL at 09:18

## 2024-12-09 RX ADMIN — HYDROMORPHONE HYDROCHLORIDE 1 MG: 1 INJECTION, SOLUTION INTRAMUSCULAR; INTRAVENOUS; SUBCUTANEOUS at 11:18

## 2024-12-09 RX ADMIN — PANTOPRAZOLE SODIUM 40 MG: 40 TABLET, DELAYED RELEASE ORAL at 09:19

## 2024-12-09 RX ADMIN — HYDROMORPHONE HYDROCHLORIDE 1 MG: 1 INJECTION, SOLUTION INTRAMUSCULAR; INTRAVENOUS; SUBCUTANEOUS at 18:30

## 2024-12-09 RX ADMIN — HYDROMORPHONE HYDROCHLORIDE 1 MG: 1 INJECTION, SOLUTION INTRAMUSCULAR; INTRAVENOUS; SUBCUTANEOUS at 02:17

## 2024-12-09 RX ADMIN — PANTOPRAZOLE SODIUM 40 MG: 40 TABLET, DELAYED RELEASE ORAL at 16:18

## 2024-12-09 RX ADMIN — HYDROMORPHONE HYDROCHLORIDE 1 MG: 1 INJECTION, SOLUTION INTRAMUSCULAR; INTRAVENOUS; SUBCUTANEOUS at 22:51

## 2024-12-09 RX ADMIN — HYDROMORPHONE HYDROCHLORIDE 1 MG: 1 INJECTION, SOLUTION INTRAMUSCULAR; INTRAVENOUS; SUBCUTANEOUS at 16:19

## 2024-12-09 RX ADMIN — HYDROXYUREA 3000 MG: 500 CAPSULE ORAL at 09:19

## 2024-12-09 RX ADMIN — ONDANSETRON 4 MG: 2 INJECTION INTRAMUSCULAR; INTRAVENOUS at 06:50

## 2024-12-09 RX ADMIN — ASPIRIN 81 MG CHEWABLE TABLET 81 MG: 81 TABLET CHEWABLE at 09:19

## 2024-12-09 RX ADMIN — HYDROMORPHONE HYDROCHLORIDE 1 MG: 1 INJECTION, SOLUTION INTRAMUSCULAR; INTRAVENOUS; SUBCUTANEOUS at 09:15

## 2024-12-09 RX ADMIN — HYDROMORPHONE HYDROCHLORIDE 1 MG: 1 INJECTION, SOLUTION INTRAMUSCULAR; INTRAVENOUS; SUBCUTANEOUS at 14:03

## 2024-12-09 RX ADMIN — HYDROMORPHONE HYDROCHLORIDE 1 MG: 1 INJECTION, SOLUTION INTRAMUSCULAR; INTRAVENOUS; SUBCUTANEOUS at 20:42

## 2024-12-09 RX ADMIN — HYDROMORPHONE HYDROCHLORIDE 1 MG: 1 INJECTION, SOLUTION INTRAMUSCULAR; INTRAVENOUS; SUBCUTANEOUS at 04:39

## 2024-12-09 RX ADMIN — ASPIRIN 81 MG CHEWABLE TABLET 81 MG: 81 TABLET CHEWABLE at 20:42

## 2024-12-09 RX ADMIN — HYDROMORPHONE HYDROCHLORIDE 1 MG: 1 INJECTION, SOLUTION INTRAMUSCULAR; INTRAVENOUS; SUBCUTANEOUS at 06:50

## 2024-12-09 RX ADMIN — ONDANSETRON 4 MG: 2 INJECTION INTRAMUSCULAR; INTRAVENOUS at 17:43

## 2024-12-09 RX ADMIN — GABAPENTIN 600 MG: 300 CAPSULE ORAL at 22:04

## 2024-12-09 RX ADMIN — HEPARIN, PORCINE (PF) 10 UNIT/ML INTRAVENOUS SYRINGE 5 ML: at 14:06

## 2024-12-09 ASSESSMENT — ACTIVITIES OF DAILY LIVING (ADL)
ADLS_ACUITY_SCORE: 35

## 2024-12-09 NOTE — PROGRESS NOTES
[facility]    Medicine Progress Note - Hospitalist Service, GOLD TEAM 17    Date of Admission:  12/6/2024    Assessment & Plan   Jennifer Cervantes is a 24 year old F with PMH of PE, not on AC, SCD c/b CVA who presented with acute hematuria vs vaginal bleeding and acute worsening of back pain consistent with sickle cell pain crisis.  Had just been hospitalized 11/27-12/4/2024 for sickle cell pain crisis complicated by acute asthma exacerbation with hypoxemic respiratory failure which had resolved.         #Suspected vaginal bleeding:  Pelvic and renal ultrasounds normal.  UA bland, no RBCs. Seen by OB/GYN 12/7/24, felt her bleeding was consistent with a regular bleeding often experience by patient's using Nexplanon, and her bleeding is not felt to be excessively heavy based on her history.  Denies dysuria.  No fevers or chills.  No active abdominal pain.  Eating and drinking well now.  -Per OB/GYN, can be treated with OCPs, however given history of PE and CVA not a good candidate for estrogen containing contraceptive or TXA.  May consider transition from Nexplanon to alternative, nonestrogen containing contraceptive (such as Mirena IUD) in the outpatient setting.     #Elevated transaminases:  Mildly elevated transaminases of unclear etiology.  Alkaline phos normal.  Acute on chronic elevated total bilirubin likely explained by sickle cell crisis.  No active symptoms, abdominal exam benign.  Eating and drinking well.  Transaminases normalizing.   -Monitor daily CMP while in hospital  -Monitor clinically, if transaminases not continuing to normalize will consider abdominal ultrasound      #Sickle cell pain crisis  Follows with Dr. Duncan. Has a L chest port for simple transfusions (last a few weeks ago) and exchanges as needed. Reports she is on oxycodone 10 mg up to 6x per day at home but usually only takes 3-4x daily.  Just hospitalized 11/27-12//24 with acute pain crisis  complicated by asthma exacerbation.  Now presents with suspected vaginal bleeding, increased back pain.  Admitting provider discussed PCA with patient, however patient felt her pain level did not warrant PCA.  Ongoing back pain, no chest pain or dyspnea, no other active pain complaints.  Feels IV Dilaudid with oral oxycodone as needed effective at this time.  Platelets normal.  Hemoglobin had trended down to 6.4, trending back up to 7.0.  Acute on chronic elevated T. bili, but transaminases improving.  Thought she might be going home 12/9, but had some nausea and emesis x 1 in a.m., resulting rib pain and worsening pain.  Now feeling better but does not feel ready to go home yet.  - continue PTA oxycodone 10 mg every 6 hours as needed  - continue IV dilaudid prn for breakthrough pain 1 mg q2h  - continue PTA methocarbamol  - continue PTA hydroxyurea  -Bowel regimen  -Daily CBC, CMP, reticulocyte count  -Consult with hematology prior to any PRBC transfusions  -Hematology consult appreciated  -Hopefully discharge home 12/10    #Hemochromatosis   Ferritin 5942 on 12/6.  -deferiprone not on formulary, holding while admitted  -Consult with hematology prior to any PRBC transfusions     #Hx of recurrent PE  #Hx of CVA  #Contrast dye allergy  Not on AC, takes ASA 81 mg twice daily.  Has residual right arm flexion contracture, slight limp but otherwise independent and does not require assistive device to ambulate.  Left handed.  Recently hospitalized with acute hypoxemic respiratory failure ultimately attributed to acute asthma exacerbation, subsequently resolved. Bilateral doppler US negative for DVT 11/27, and V/Q scan negative for PE 11/29.  Denies any chest pain or dyspnea, oxygenating well on room air.  Lungs CTA on exam.  -Resumed PTA ASA 81 mg twice daily    #Asthma with recent acute exacerbation  Currently compensated.  Recent VQ scan negative.  No active cough or dyspnea.  Oxygenating well on room air.  Lungs CTA on  "exam.  - continue PTA Symbicort with albuterol nebs as needed  - outpatient f/u with pulmonology     #Leukocytosis   Mild, likely stress reaction.  No fevers or chills.  UA bland.  No localizing signs or symptoms of infection.  -Daily CBC as above    #Migraines:  Patient reports history of migraines, having a migraine 12/8.  Requested her PTA ibuprofen be resumed.  No active headaches 12/9.  -Resume PTA ibuprofen 800 mg 3 times daily as needed for migraines, added PPI prophylaxis               Diet: Regular Diet Adult    DVT Prophylaxis: ambulating, ASA 81 mg bid   Lopez Catheter: Not present  Lines: PRESENT      Port a Cath 11/25/24 Single Lumen Left Chest wall-Site Assessment: WDL      Cardiac Monitoring: None  Code Status: Full Code      Clinically Significant Risk Factors                                # Overweight: Estimated body mass index is 27.31 kg/m  as calculated from the following:    Height as of this encounter: 1.626 m (5' 4\").    Weight as of this encounter: 72.2 kg (159 lb 1.6 oz)., PRESENT ON ADMISSION       # Financial/Environmental Concerns:    # Asthma: noted on problem list        Social Drivers of Health    Housing Stability: Low Risk  (12/7/2024)    Housing Stability     Do you have housing? : Yes     Are you worried about losing your housing?: No   Recent Concern: Housing Stability - High Risk (11/28/2024)    Housing Stability     Do you have housing? : No     Are you worried about losing your housing?: No   Physical Activity: Unknown (7/3/2024)    Exercise Vital Sign     Days of Exercise per Week: 1 day   Social Connections: Unknown (7/3/2024)    Social Connection and Isolation Panel [NHANES]     Frequency of Social Gatherings with Friends and Family: Never          Disposition Plan     Medically Ready for Discharge: Anticipated Tomorrow             Augusto Gracia MD  Hospitalist Service, GOLD TEAM 17  [facility]  Securely message with " [employer] (more info)  Text page via [facility] Paging/Directory   See signed in provider for up to date coverage information  ______________________________________________________________________    Interval History   No further migraines today.  Did have some nausea and emesis this morning, did not see the color because the room was dark.  No further nausea but has not eaten yet.  Had a good bowel movement today.  The retching resulted in increased rib pain, does not feel ready to go home today.  Pain overall including her back pain getting better.  Has had no further vaginal spotting the past few days.  Denies any cough or dyspnea.  No fevers, chills or night sweats.  No dysuria or urgency.  Denies any abdominal pain.    Physical Exam   Vital Signs: Temp: 97.8  F (36.6  C) Temp src: Oral BP: 126/77 Pulse: 85   Resp: 16 SpO2: 96 % O2 Device: None (Room air)    Weight: 159 lbs 1.6 oz  General: Alert and oriented x 4.  Very pleasant.  Speech normal.  Affect appropriate.  Chest: CTA bilaterally.  No wheezes  CV: RRR.  No murmurs.  Abdomen: Normal active bowel sounds.  Soft, nontender, nondistended.  Extremities: No edema.  Neuro: Mild right facial droop sparing forehead.  CN II through XII otherwise grossly intact.  Right distal upper extremity flexion contracture, proximal right arm strength okay.  Right lower extremity strength intact.  Normal left upper and lower extremity strength.        Data   Imaging results reviewed over the past 24 hrs:   No results found for this or any previous visit (from the past 24 hours).    Most Recent 3 CBC's:  Recent Labs   Lab Test 12/09/24  0911 12/08/24  1231 12/07/24  0848   WBC 13.3* 12.6* 11.2*   HGB 7.0* 6.8* 6.4*   MCV 89 86 87    410 417     Most Recent 3 BMP's:  Recent Labs   Lab Test 12/09/24  0911 12/08/24  1231 12/07/24  0848    140 142   POTASSIUM 4.1 3.8 3.9   CHLORIDE 102 105 109*   CO2 24 25 25   BUN 5.0* 4.4* 7.0   CR 0.55 0.51 0.63   ANIONGAP 10 10 8   MICAH  8.8 8.8 8.4*   GLC 79 88 93     Most Recent 2 LFT's:  Recent Labs   Lab Test 12/09/24  0911 12/08/24  1231   AST 53* 52*   ALT 56* 65*   ALKPHOS 59 58   BILITOTAL 3.4* 3.0*     Most Recent 3 INR's:  Recent Labs   Lab Test 10/24/24  2301 05/17/24  0658 05/16/24  1218   INR 1.14 1.21* 1.29*     Most Recent 3 Troponin's:  Recent Labs   Lab Test 11/10/21  1943 11/10/21  1109 09/17/21  0512 08/07/21  0049 06/12/21  0149 04/29/21  0344 04/28/21  1850   TROPI  --   --   --   --  <0.015 <0.015 <0.015   TROPONIN <0.015 <0.015 <0.015   < >  --   --   --     < > = values in this interval not displayed.     Most Recent 6 Bacteria Isolates From Any Culture (See EPIC Reports for Culture Details):  Recent Labs   Lab Test 06/06/21  0430 05/07/21  1030 05/07/21  0905 04/29/21  2205 04/29/21  2049 04/28/21  1858   CULT No growth No growth No growth No growth No growth No growth     Most Recent TSH and T4:  Recent Labs   Lab Test 04/23/24 1754   TSH 0.60     Most Recent Hemoglobin A1c:No lab results found.  Most Recent Urinalysis:  Recent Labs   Lab Test 12/06/24 2213   COLOR Yellow   APPEARANCE Clear   URINEGLC Negative   URINEBILI Negative   URINEKETONE Negative   SG 1.013   UBLD Negative   URINEPH 6.5   PROTEIN Negative   NITRITE Negative   LEUKEST Negative   RBCU 1   WBCU 1     Most Recent ESR & CRP:  Recent Labs   Lab Test 04/23/24  1754 05/10/23  1948 08/29/22  1130 07/10/21  0339 05/10/21  0442   SED  --   --  21*   < >  --    CRP  --   --   --   --  76.0*   CRPI <3.00   < >  --   --   --     < > = values in this interval not displayed.     Most Recent Anemia Panel:  Recent Labs   Lab Test 12/09/24  0911 12/07/24  0039 12/06/24 2129 09/05/22  0535 08/29/22  1130   WBC 13.3*   < > 11.2*   < > 14.8*   HGB 7.0*   < > 6.9*   < > 7.7*   HCT 20.2*   < > 19.3*   < > 21.3*   MCV 89   < > 86   < > 87      < > 404   < > 390   IRON  --   --  160*  --  154   IRONSAT  --   --   --   --  85*   RETICABSCT 0.588*   < > 0.413*   < >   --    RETP 25.3*   < > 18.4*   < >  --    FEB  --   --   --   --  181*   SALMA  --   --  5,942*   < > 5,635*    < > = values in this interval not displayed.

## 2024-12-09 NOTE — PLAN OF CARE
Goal Outcome Evaluation:      Plan of Care Reviewed With: patient    Overall Patient Progress: improving    Outcome Evaluation: Pt's pain managed with IV diluadid, new complain for migraine headache, provider noified.    O2: On RA   Output: Continent of B/B   Last BM: 12/8/24 per patient   Activity: Independent with ADLs   Skin: Visible skin intact   Pain: Managed with dilaudid. Received Ibuprofen for migraine    CMS: Intact, A/O x4, able to communicate her needs   Dressing: L Port A Cath dressing CDI   Diet: Rgular   LDA: L Port A Cath, saline locked   Plan: Con't POC.    Additional Info: Pt's VSS on RA, No CP, SOB reported. IV Zofran was effective for nausea this AM.

## 2024-12-09 NOTE — PLAN OF CARE
"Goal Outcome Evaluation:      Plan of Care Reviewed With: patient      VS: /63 (BP Location: Left arm, Patient Position: Supine, Cuff Size: Adult Regular)   Pulse 83   Temp 98.3  F (36.8  C) (Oral)   Resp 18   Ht 1.626 m (5' 4\")   Wt 72.2 kg (159 lb 1.6 oz)   LMP  (LMP Unknown)   SpO2 99%   BMI 27.31 kg/m      O2: >92% on RA, no complaints of SOB or chest pain.   Output: Voiding w/o pain or difficulty to bathroom.   Last BM: 12/8/24   Activity: Independent.    Skin: Visible skin intact.   Pain: Managed with IV Dilaudid PRN q2h.   CMS: A&Ox4   Dressing: L Port A Cath dressing CDI.   Diet: Regular. No complaints of nausea or vomiting.   LDA: L chest port   Equipment: Personal belongings   Plan: Possible discharge tomorrow.   Additional Info:           "

## 2024-12-10 ENCOUNTER — PATIENT OUTREACH (OUTPATIENT)
Dept: CARE COORDINATION | Facility: CLINIC | Age: 24
End: 2024-12-10
Payer: COMMERCIAL

## 2024-12-10 VITALS
DIASTOLIC BLOOD PRESSURE: 69 MMHG | HEART RATE: 80 BPM | TEMPERATURE: 98.7 F | HEIGHT: 64 IN | BODY MASS INDEX: 27.16 KG/M2 | WEIGHT: 159.1 LBS | RESPIRATION RATE: 18 BRPM | SYSTOLIC BLOOD PRESSURE: 116 MMHG | OXYGEN SATURATION: 91 %

## 2024-12-10 LAB
ALBUMIN SERPL BCG-MCNC: 4.3 G/DL (ref 3.5–5.2)
ALP SERPL-CCNC: 63 U/L (ref 40–150)
ALT SERPL W P-5'-P-CCNC: 54 U/L (ref 0–50)
ANION GAP SERPL CALCULATED.3IONS-SCNC: 10 MMOL/L (ref 7–15)
AST SERPL W P-5'-P-CCNC: 54 U/L (ref 0–45)
BILIRUB SERPL-MCNC: 3.5 MG/DL
BUN SERPL-MCNC: 7.5 MG/DL (ref 6–20)
CALCIUM SERPL-MCNC: 9.3 MG/DL (ref 8.8–10.4)
CHLORIDE SERPL-SCNC: 103 MMOL/L (ref 98–107)
CREAT SERPL-MCNC: 0.52 MG/DL (ref 0.51–0.95)
EGFRCR SERPLBLD CKD-EPI 2021: >90 ML/MIN/1.73M2
ERYTHROCYTE [DISTWIDTH] IN BLOOD BY AUTOMATED COUNT: 24.7 % (ref 10–15)
GLUCOSE SERPL-MCNC: 91 MG/DL (ref 70–99)
HCO3 SERPL-SCNC: 25 MMOL/L (ref 22–29)
HCT VFR BLD AUTO: 21.2 % (ref 35–47)
HGB BLD-MCNC: 7.4 G/DL (ref 11.7–15.7)
MCH RBC QN AUTO: 31 PG (ref 26.5–33)
MCHC RBC AUTO-ENTMCNC: 34.9 G/DL (ref 31.5–36.5)
MCV RBC AUTO: 89 FL (ref 78–100)
PLATELET # BLD AUTO: 421 10E3/UL (ref 150–450)
POTASSIUM SERPL-SCNC: 3.9 MMOL/L (ref 3.4–5.3)
PROT SERPL-MCNC: 7.4 G/DL (ref 6.4–8.3)
RBC # BLD AUTO: 2.39 10E6/UL (ref 3.8–5.2)
RETICS # AUTO: 0.75 10E6/UL (ref 0.03–0.1)
RETICS/RBC NFR AUTO: 31.6 % (ref 0.5–2)
SODIUM SERPL-SCNC: 138 MMOL/L (ref 135–145)
WBC # BLD AUTO: 12.9 10E3/UL (ref 4–11)

## 2024-12-10 PROCEDURE — 36415 COLL VENOUS BLD VENIPUNCTURE: CPT | Performed by: INTERNAL MEDICINE

## 2024-12-10 PROCEDURE — 250N000013 HC RX MED GY IP 250 OP 250 PS 637: Performed by: INTERNAL MEDICINE

## 2024-12-10 PROCEDURE — 82247 BILIRUBIN TOTAL: CPT | Performed by: INTERNAL MEDICINE

## 2024-12-10 PROCEDURE — 85014 HEMATOCRIT: CPT | Performed by: INTERNAL MEDICINE

## 2024-12-10 PROCEDURE — 84450 TRANSFERASE (AST) (SGOT): CPT | Performed by: INTERNAL MEDICINE

## 2024-12-10 PROCEDURE — 250N000013 HC RX MED GY IP 250 OP 250 PS 637: Performed by: PEDIATRICS

## 2024-12-10 PROCEDURE — 99239 HOSP IP/OBS DSCHRG MGMT >30: CPT | Performed by: STUDENT IN AN ORGANIZED HEALTH CARE EDUCATION/TRAINING PROGRAM

## 2024-12-10 PROCEDURE — 84460 ALANINE AMINO (ALT) (SGPT): CPT | Performed by: INTERNAL MEDICINE

## 2024-12-10 PROCEDURE — 84155 ASSAY OF PROTEIN SERUM: CPT | Performed by: INTERNAL MEDICINE

## 2024-12-10 PROCEDURE — 85045 AUTOMATED RETICULOCYTE COUNT: CPT | Performed by: INTERNAL MEDICINE

## 2024-12-10 PROCEDURE — 250N000011 HC RX IP 250 OP 636: Performed by: INTERNAL MEDICINE

## 2024-12-10 RX ORDER — OXYCODONE HYDROCHLORIDE 10 MG/1
10 TABLET ORAL EVERY 6 HOURS PRN
Qty: 12 TABLET | Refills: 0 | Status: SHIPPED | OUTPATIENT
Start: 2024-12-10 | End: 2024-12-10

## 2024-12-10 RX ADMIN — HYDROMORPHONE HYDROCHLORIDE 1 MG: 1 INJECTION, SOLUTION INTRAMUSCULAR; INTRAVENOUS; SUBCUTANEOUS at 03:17

## 2024-12-10 RX ADMIN — HYDROMORPHONE HYDROCHLORIDE 1 MG: 1 INJECTION, SOLUTION INTRAMUSCULAR; INTRAVENOUS; SUBCUTANEOUS at 00:56

## 2024-12-10 RX ADMIN — PANTOPRAZOLE SODIUM 40 MG: 40 TABLET, DELAYED RELEASE ORAL at 07:53

## 2024-12-10 RX ADMIN — HYDROMORPHONE HYDROCHLORIDE 1 MG: 1 INJECTION, SOLUTION INTRAMUSCULAR; INTRAVENOUS; SUBCUTANEOUS at 09:47

## 2024-12-10 RX ADMIN — HYDROMORPHONE HYDROCHLORIDE 1 MG: 1 INJECTION, SOLUTION INTRAMUSCULAR; INTRAVENOUS; SUBCUTANEOUS at 15:37

## 2024-12-10 RX ADMIN — HEPARIN, PORCINE (PF) 10 UNIT/ML INTRAVENOUS SYRINGE 5 ML: at 13:41

## 2024-12-10 RX ADMIN — DEFERASIROX 1440 MG: 360 TABLET, FILM COATED ORAL at 07:52

## 2024-12-10 RX ADMIN — ASPIRIN 81 MG CHEWABLE TABLET 81 MG: 81 TABLET CHEWABLE at 07:53

## 2024-12-10 RX ADMIN — HYDROMORPHONE HYDROCHLORIDE 1 MG: 1 INJECTION, SOLUTION INTRAMUSCULAR; INTRAVENOUS; SUBCUTANEOUS at 11:46

## 2024-12-10 RX ADMIN — HYDROMORPHONE HYDROCHLORIDE 1 MG: 1 INJECTION, SOLUTION INTRAMUSCULAR; INTRAVENOUS; SUBCUTANEOUS at 13:41

## 2024-12-10 RX ADMIN — HYDROMORPHONE HYDROCHLORIDE 1 MG: 1 INJECTION, SOLUTION INTRAMUSCULAR; INTRAVENOUS; SUBCUTANEOUS at 07:50

## 2024-12-10 RX ADMIN — HEPARIN, PORCINE (PF) 10 UNIT/ML INTRAVENOUS SYRINGE 5 ML: at 15:37

## 2024-12-10 RX ADMIN — SENNOSIDES AND DOCUSATE SODIUM 2 TABLET: 50; 8.6 TABLET ORAL at 07:36

## 2024-12-10 RX ADMIN — HYDROMORPHONE HYDROCHLORIDE 1 MG: 1 INJECTION, SOLUTION INTRAMUSCULAR; INTRAVENOUS; SUBCUTANEOUS at 05:22

## 2024-12-10 RX ADMIN — HYDROXYUREA 3000 MG: 500 CAPSULE ORAL at 07:52

## 2024-12-10 ASSESSMENT — ACTIVITIES OF DAILY LIVING (ADL)
ADLS_ACUITY_SCORE: 35
DEPENDENT_IADLS:: TRANSPORTATION
ADLS_ACUITY_SCORE: 35

## 2024-12-10 NOTE — PLAN OF CARE
"Goal Outcome Evaluation:      Plan of Care Reviewed With: patient    Outcome Evaluation: Anticipate discharge to home when med-ready.  No care mgmt needs identified.        MAURISIO Galloway  [facility]  Units 8M/S & 10 ICU  [employer] on [employer] - Search by name or search \"RNCC\"  Phone: [phone]    "

## 2024-12-10 NOTE — PROGRESS NOTES
Clinic Care Coordination Contact    Situation: Patient chart reviewed by care coordinator.    Background: Pt is still currently in the hospital, CHW will follow up with Pt once they have been discharged.    Plan/Recommendations: CHW will follow up with Pt once she has been discharged.    Becki JOSHUA, Community Health Worker  Clinic Care Coordination  [facility]   Clinic #: [phone]  Direct #: [phone]

## 2024-12-10 NOTE — PLAN OF CARE
"Goal Outcome Evaluation:      Plan of Care Reviewed With: patient    Overall Patient Progress: no change      VS: /62 (BP Location: Left arm)   Pulse 87   Temp 98.3  F (36.8  C) (Oral)   Resp 18   Ht 1.626 m (5' 4\")   Wt 72.2 kg (159 lb 1.6 oz)   LMP  (LMP Unknown)   SpO2 91%   BMI 27.31 kg/m        O2: Pt is 91% on RA no complaints of SOB or chest pain.   Output: Voiding adequate amounts w/o pain or difficulty to bathroom.   Last BM: 12/8/24   Activity: Independent     Skin: Intact    Pain: Reports pain on 6/10. PRN dilaudid given    CMS: A&O x4   Dressing: N/A   Diet: Regular, tolerating well. No complaints of nausea or vomiting.   LDA: L chest port   Equipment: Personal belongings   Plan: C/POC   Additional Info:          "

## 2024-12-10 NOTE — TELEPHONE ENCOUNTER
Pre visit planning completed.      Procedure details:    Patient scheduled for Upper endoscopy (EGD) on 12/12/2024.     Arrival time: 1100. Procedure time 1230    Facility location: [facility]; [address] [place] [address], [address] Floor, [place] [address]. Check in location: Main entrance at registration desk.    Sedation type: MAC    Pre op exam needed? No.    Indication for procedure: Epigastric pain, nausea, vomiting       Chart review:     Electronic implanted devices? No    Recent diagnosis of diverticulitis within the last 6 weeks? N/A      Medication review:    Diabetic? No    Anticoagulants? No    Weight loss medication/injectable? No GLP-1 medication per patient's medication list. Nursing to verify with pre-assessment call.    Other medication HOLDING recommendations:  N/A      Prep for procedure:     Procedure information and instructions sent via uMentioned         Zaynab Gresham RN  Endoscopy Procedure Pre Assessment   [phone] option 2

## 2024-12-10 NOTE — CONSULTS
Care Management Initial Consult    General Information  Assessment completed with: Patient, VM-chart review,    Type of CM/SW Visit: Initial Assessment    Primary Care Provider verified and updated as needed: Yes   Readmission within the last 30 days: previous discharge plan unsuccessful   Return Category: Exacerbation of disease  Reason for Consult: other (see comments) (elevated risk score)  Advance Care Planning: Advance Care Planning Reviewed: no concerns identified          Communication Assessment  Patient's communication style: spoken language (English or Bilingual)    Hearing Difficulty or Deaf: no   Wear Glasses or Blind: yes    Cognitive  Cognitive/Neuro/Behavioral: WDL                      Living Environment:   People in home: parent(s), sibling(s)     Current living Arrangements: house      Able to return to prior arrangements: yes       Family/Social Support:  Care provided by: self, parent(s) (pt's mother provides care for her, when needed)  Provides care for: no one  Marital Status: Single  Support system: Parent(s)          Description of Support System: Supportive, Involved    Support Assessment: Adequate family and caregiver support, Adequate social supports    Current Resources:   Patient receiving home care services: No        Community Resources: PCA (pt's mother is her PCA, per chart review)  Equipment currently used at home: none  Supplies currently used at home: None    Employment/Financial:  Employment Status: disabled        Financial Concerns: none   Referral to Financial Worker: No       Does the patient's insurance plan have a 3 day qualifying hospital stay waiver?  Yes     Which insurance plan 3 day waiver is available? Alternative insurance waiver    Will the waiver be used for post-acute placement? No    Lifestyle & Psychosocial Needs:  Social Drivers of Health     Food Insecurity: Low Risk  (12/7/2024)    Food Insecurity     Within the past 12 months, did you worry that your food  would run out before you got money to buy more?: No     Within the past 12 months, did the food you bought just not last and you didn t have money to get more?: No   Depression: Not at risk (9/30/2024)    PHQ-2     PHQ-2 Score: 2   Housing Stability: Low Risk  (12/7/2024)    Housing Stability     Do you have housing? : Yes     Are you worried about losing your housing?: No   Recent Concern: Housing Stability - High Risk (11/28/2024)    Housing Stability     Do you have housing? : No     Are you worried about losing your housing?: No   Tobacco Use: Low Risk  (12/6/2024)    Patient History     Smoking Tobacco Use: Never     Smokeless Tobacco Use: Never     Passive Exposure: Never   Financial Resource Strain: Low Risk  (12/7/2024)    Financial Resource Strain     Within the past 12 months, have you or your family members you live with been unable to get utilities (heat, electricity) when it was really needed?: No   Alcohol Use: Not on file   Transportation Needs: Low Risk  (12/7/2024)    Transportation Needs     Within the past 12 months, has lack of transportation kept you from medical appointments, getting your medicines, non-medical meetings or appointments, work, or from getting things that you need?: No   Physical Activity: Unknown (7/3/2024)    Exercise Vital Sign     Days of Exercise per Week: 1 day     Minutes of Exercise per Session: Not on file   Interpersonal Safety: Low Risk  (12/7/2024)    Interpersonal Safety     Do you feel physically and emotionally safe where you currently live?: Yes     Within the past 12 months, have you been hit, slapped, kicked or otherwise physically hurt by someone?: No     Within the past 12 months, have you been humiliated or emotionally abused in other ways by your partner or ex-partner?: No   Stress: No Stress Concern Present (7/3/2024)    [employer] - Occupational Stress Questionnaire     Feeling of Stress : Only a little   Social Connections:  Unknown (7/3/2024)    Social Connection and Isolation Panel [NHANES]     Frequency of Communication with Friends and Family: Not on file     Frequency of Social Gatherings with Friends and Family: Never     Attends Yazdanism Services: Not on file     Active Member of Clubs or Organizations: Not on file     Attends Club or Organization Meetings: Not on file     Marital Status: Not on file   Health Literacy: Not on file       Functional Status:  Prior to admission patient needed assistance:   Dependent ADLs:: Independent  Dependent IADLs:: Transportation       Mental Health Status:  Mental Health Status:  (No concerns discussed)       Chemical Dependency Status:  Chemical Dependency Status:  (No concerns discussed)             Values/Beliefs:  Spiritual, Cultural Beliefs, Yazdanism Practices, Values that affect care: no               Discussed  Partnership in Safe Discharge Planning  document with patient/family: No    Additional Information:  Case discussed in rounds this AM.  Per provider, pt will likely discharge today to home.  No needs requiring care coordination team involvement were identified during rounds.    CMA indicated d/t elevated unplanned readmission risk score.  Last CMA was completed 11/28/24 by Sisi FRYE (previous admission).    12:00pm - Met w/ pt at bedside, introduced self and role, confirmed CMA data, and discussed discharge planning process.  Pt confirmed address, phone number, and PCP remain the same.  Pt reported no changes to previously collected CMA data.  Pt had no questions/concerns for this writer.  Advised pt to alert bedside RN in the event anything comes up that she would like to speak w/ care coordination team about.    Internal clinic care coordination handoff placed.      Next Steps:  No further care management intervention anticipated at this time.  Please re-consult if further needs arise.  Care management signing off.            MAURISIO Galloway  [facility]  "[facility] [place]  Units 8M/S & 10 ICU  [employer] on [place] - Search by name or search \"[facility]\"  Phone: [phone]      "

## 2024-12-10 NOTE — DISCHARGE SUMMARY
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    Weight as of this encounter: 72.2 kg (159 lb 1.6 oz).       Follow-ups Needed After Discharge   Follow-up Appointments       Adult [facility] Follow-up and recommended labs and tests      Follow up with  Jose Rafael Gomez CNP, at the [facility] hematology clinic, on 12/19/24 for a post-hospitalization follow up.    Appointments on [place] and/or [facility] (with [facility] or [facility] provider or service). Call [phone] if you haven't heard regarding these appointments within 7 days of discharge.              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 Evaluated by OB/GYN who stated her irregular vaginal bleeding was not profound and likely secondary to her nexplanon. They recommended she switch to a " different birth control in the outpatient setting. Her pain was managed without a PCA and she transitioned back to her home regimen of oxycodone 10mg q6 PRN at discharge. She was prescribed 12 pills of oxycodone 10mg to bridge her to her next hematology appointment (scheduled for 12/19/24). An outpatient OB/GYN appointment was requested for her.    ADDENDUM:   -In discussion with [facility] [place] [place] pharmacy the patient had 15 pills of oxycodone filled on 12/4/24 (which is still not showing in her PDMP). Given this I canceled the order for oxycodone 10mg at discharge. Will notify her hematology team.      #Suspected vaginal bleeding:  Pelvic and renal ultrasounds normal.  UA bland, no RBCs. Seen by OB/GYN 12/7/24, felt her bleeding was consistent with a regular bleeding often experience by patient's using Nexplanon, and her bleeding is not felt to be excessively heavy based on her history.  Denies dysuria.  No fevers or chills.  No active abdominal pain.  Eating and drinking well now.  -Per OB/GYN, can be treated with OCPs, however given history of PE and CVA not a good candidate for estrogen containing contraceptive or TXA.  May consider transition from Nexplanon to alternative, nonestrogen containing contraceptive (such as Mirena IUD) in the outpatient setting.      #Elevated transaminases:  Mildly elevated transaminases of unclear etiology.  Alkaline phos normal.  Acute on chronic elevated total bilirubin likely explained by sickle cell crisis.  No active symptoms, abdominal exam benign.  Eating and drinking well.  Transaminases normalizing.   -Monitor daily CMP while in hospital  -Monitor clinically, if transaminases not continuing to normalize will consider abdominal ultrasound        #Sickle cell pain crisis  Follows with Dr. Duncan. Has a L chest port for simple transfusions (last a few weeks ago) and exchanges as needed. Reports she is on oxycodone 10 mg up to 6x per day at home but usually only takes 3-4x daily.   Just hospitalized 11/27-12//24 with acute pain crisis complicated by asthma exacerbation.  Now presents with suspected vaginal bleeding, increased back pain.  Admitting provider discussed PCA with patient, however patient felt her pain level did not warrant PCA.  Ongoing back pain, no chest pain or dyspnea, no other active pain complaints.  Managed with IV Dilaudid with oral oxycodone as needed effective at this time.  Platelets normal.  Hemoglobin had trended down to 6.4, trending back up to 7.0.  Acute on chronic elevated T. bili, but transaminases improving.    - continue PTA oxycodone 10 mg every 6 hours as needed  - continue PTA methocarbamol  - continue PTA hydroxyurea     #Hemochromatosis   Ferritin 5942 on 12/6.  -discharged with PTA deferiprone       #Hx of recurrent PE  #Hx of CVA  #Contrast dye allergy  Not on AC, takes ASA 81 mg twice daily.  Has residual right arm flexion contracture, slight limp but otherwise independent and does not require assistive device to ambulate.  Left handed.  Recently hospitalized with acute hypoxemic respiratory failure ultimately attributed to acute asthma exacerbation, subsequently resolved. Bilateral doppler US negative for DVT 11/27, and V/Q scan negative for PE 11/29.  Denies any chest pain or dyspnea, oxygenating well on room air.  Lungs CTA on exam.  -Resumed PTA ASA 81 mg twice daily     #Asthma with recent acute exacerbation  Currently compensated.  Recent VQ scan negative.  No active cough or dyspnea.  Oxygenating well on room air.  Lungs CTA on exam.  - continue PTA Symbicort      #Leukocytosis, stable  Mild, likely stress reaction.       #Migraines  Patient reports history of migraines, having a migraine 12/8.  Requested her PTA ibuprofen be resumed.  No active headaches 12/9.  -Resume PTA ibuprofen 800 mg 3 times daily as needed for migraines, added PPI prophylaxis       Consultations This Hospital Stay   OB GYN IP CONSULT  HEMATOLOGY ADULT IP CONSULT  CARE  MANAGEMENT / SOCIAL WORK IP CONSULT    Code Status   Full Code    Time Spent on this Encounter   I, Jered Gonzalez MD, personally saw the patient today and spent greater than 30 minutes discharging this patient.       Jered Gonzalez MD  [employer] [address]  [address] [facility] [address]  Phone: [phone]  Fax: [phone]  ______________________________________________________________________    Physical Exam   Vital Signs: Temp: 98.7  F (37.1  C) Temp src: Oral BP: 116/69 Pulse: 80   Resp: 18 SpO2: 91 % O2 Device: None (Room air)    Weight: 159 lbs 1.6 oz  Exam  Gen: Awake and alert, appears comfortable, appears stated age.  HEENT: NCAT, sclerae anicteric  CV: Regular rhythm and rate, extremities warm and well perfused, no lower extremity edema.  Pulm: Normal work of breathing, lungs clear to auscultation, no crackles or wheezing.  GI: Soft, nontender, nondistended.  Skin: Warm, dry, no jaundice.  Neuro: Answering questions appropriately, speech normal, moves all extremities symmetrically.       Primary Care Physician   Suraj Case    Discharge Orders      Primary Care - Care Coordination Referral      Ob/Gyn  Referral      Reason for your hospital stay    Dear Jennifer Cervantes,    Your were hospitalized at [facility] with vaginal bleeding and a pain crisis. You were evaluated by the OB/GYN team who stated that your vaginal bleeding was within what was expected and you can change your birth control once leaving the hospital. Your blood levels were stable throughout your stay. Over your hospitalization your condition improved and today you are ready to be discharged.  You should continue to improve but if you develop fever, shortness of breath, or crushing chest pain then please seek medical attention.    We are suggesting the following medication changes:  -You have been sent 12 pills of oxycodone 10 mg to take until you follow up in the hematology clinic    Please  get the following tests done:  None      Please set up an appointment with:  -OB/GYN clinic (they should contact you)  -Follow up with the hematology clinic on 12/19/24    It was a pleasure meeting with you today. Thank you for allowing me and my team the privilege of caring for you during your hospitalization. You are the reason we are here, and I truly hope we provided you with the excellent service you deserve. Please let us know if there is anything else we can do for you so that we can be sure you are leaving completely satisfied with your care experience.    If you have questions call your primary care physician.    Sincerely,    Jered Gonzalez MD   Hospitalist  [facility]     Activity    Your activity upon discharge: activity as tolerated     Adult [email or web address] Follow-up and recommended labs and tests    Follow up with  Jose Rafael Gomez CNP, at the [facility] hematology clinic, on 12/19/24 for a post-hospitalization follow up.    Appointments on [place] and/or [facility] (with [email or web address]] or [facility] provider or service). Call [phone] if you haven't heard regarding these appointments within 7 days of discharge.     Diet    Follow this diet upon discharge: Current Diet:Orders Placed This Encounter      Regular Diet Adult       Significant Results and Procedures   Most Recent 3 CBC's:  Recent Labs   Lab Test 12/10/24  0804 12/09/24  0911 12/08/24  1231   WBC 12.9* 13.3* 12.6*   HGB 7.4* 7.0* 6.8*   MCV 89 89 86    362 410     Most Recent 3 BMP's:  Recent Labs   Lab Test 12/10/24  0804 12/09/24  0911 12/08/24  1231    136 140   POTASSIUM 3.9 4.1 3.8   CHLORIDE 103 102 105   CO2 25 24 25   BUN 7.5 5.0* 4.4*   CR 0.52 0.55 0.51   ANIONGAP 10 10 10   MICAH 9.3 8.8 8.8   GLC 91 79 88   ,   Results for orders placed or performed during the hospital encounter of 12/06/24   US Pelvis Cmplt w Transvag & Doppler LmtPel Duplex Limited    Narrative    EXAM: US PELVIS COMPLETE W TRANSVAGINAL AND DOPPLER  LIMITED  LOCATION: [facility]  DATE: 12/6/2024    INDICATION: Pelvic pain, vaginal bleeding with clots  COMPARISON: None.  TECHNIQUE: Transabdominal scans were performed. Endovaginal ultrasound was performed to better visualize the adnexa. Color flow with spectral Doppler and waveform analysis performed.    FINDINGS:    UTERUS: 5.5 x 2.4 x 3.5 cm. Normal in size and position with no masses.    ENDOMETRIUM: 5-6 mm. Normal smooth endometrium.    RIGHT OVARY: 3.3 x 1.9 x 2.2 cm. Normal with arterial and venous duplex flow identified. Anechoic avascular dominant follicle measuring 1.9 cm.    LEFT OVARY: Not well seen due to bowel gas.    No significant free fluid.      Impression    IMPRESSION:    1.  Normal appearance of the uterus and endometrium.  2.  Dominant follicle within an otherwise normal-appearing right ovary.  3.  The left ovary is not well seen due to anatomic location and bowel gas.         US Renal Complete Non-Vascular    Narrative    EXAM: US RENAL COMPLETE NON-VASCULAR  LOCATION: [facility]  DATE: 12/7/2024    INDICATION: hematuria  COMPARISON: None.  TECHNIQUE: Routine Bilateral Renal and Bladder Ultrasound.    FINDINGS:    RIGHT KIDNEY: 11.3 cm. Normal without hydronephrosis or masses.     LEFT KIDNEY: 10.8 cm. Normal without hydronephrosis or masses.     BLADDER: Urinary bladder is partially distended and segmentally seen. No bladder abnormalities demonstrated. Bilateral ureters jets demonstrated.      Impression    IMPRESSION:  1.  Normal kidney ultrasound.       *Note: Due to a large number of results and/or encounters for the requested time period, some results have not been displayed. A complete set of results can be found in Results Review.       Discharge Medications   Current Discharge Medication List        CONTINUE these medications which have NOT CHANGED    Details   albuterol (PROVENTIL) (2.5  MG/3ML) 0.083% neb solution Take 2 vials (5 mg) by nebulization every 6 hours as needed for shortness of breath or wheezing.  Qty: 90 mL, Refills: 3    Associated Diagnoses: Asthmatic bronchitis without complication, unspecified asthma severity, unspecified whether persistent      aspirin (ASA) 81 MG chewable tablet Take 1 tablet (81 mg) by mouth 2 times daily  Qty: 60 tablet, Refills: 11    Associated Diagnoses: Superficial venous thrombosis of arm, right      budesonide-formoterol (SYMBICORT) 160-4.5 MCG/ACT Inhaler Inhale 2 puffs twice daily plus 1-2 puffs as needed. May use up to 12 puffs per day.  Qty: 20.4 g, Refills: 11    Associated Diagnoses: Moderate persistent asthma, unspecified whether complicated      deferasirox (JADENU) 360 MG tablet Take 4 tablets (1,440 mg) by mouth daily.  Qty: 120 tablet, Refills: 11    Associated Diagnoses: Iron overload due to repeated red blood cell transfusions      Deferiprone, Twice Daily, 1000 MG TABS Take 2,000 mg by mouth 2 times daily.  Qty: 150 tablet, Refills: 3    Associated Diagnoses: Iron overload due to repeated red blood cell transfusions      diphenhydrAMINE (BENADRYL) 50 MG capsule Administer 12  hours pre - IV contrast injection and 2 hours prior to contrast. Patient must have a .  Qty: 2 capsule, Refills: 0    Associated Diagnoses: Sickle cell pain crisis (H)      EPINEPHrine (ANY BX GENERIC EQUIV) 0.3 MG/0.3ML injection 2-pack Inject 0.3 mLs (0.3 mg) into the muscle as needed for anaphylaxis.  Qty: 1 each, Refills: 1    Associated Diagnoses: Anaphylaxis, sequela      gabapentin (NEURONTIN) 300 MG capsule Take 1 capsule (300 mg) by mouth 3 times daily. Take PO 1 pill in evening (300 mg) TID to start. If tolerated, increase by 300 mg in morning or lunch every few days, updating your doctor's office in time to get refills. The maximum dose is 900 mg TID.  Qty: 90 capsule, Refills: 1    Associated Diagnoses: Other chronic pain      hydroxyurea (HYDREA) 500  MG capsule Take 6 capsules (3,000 mg) by mouth daily  Qty: 180 capsule, Refills: 11    Associated Diagnoses: 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)      ibuprofen (ADVIL/MOTRIN) 800 MG tablet Take 800 mg by mouth every 8 hours as needed for moderate pain      methocarbamol (ROBAXIN) 750 MG tablet Take 1 tablet (750 mg) by mouth 4 times daily as needed for muscle spasms (during sickle pain crises. Okay to take scheduled while in pain).  Qty: 60 tablet, Refills: 1    Associated Diagnoses: Sickle cell pain crisis (H)      methylPREDNISolone (MEDROL) 32 MG tablet Take 1 tab 12 hours before appointment and 1 tab 2 hours prior to the procedure with IV contrast.  Qty: 2 tablet, Refills: 0    Associated Diagnoses: Sickle cell pain crisis (H)      naloxone (NARCAN) 4 MG/0.1ML nasal spray Spray 4 mg into one nostril alternating nostrils as needed for opioid reversal. every 2-3 minutes until assistance arrives  Qty: 0.2 mL, Refills: 0           STOP taking these medications       oxyCODONE IR (ROXICODONE) 10 MG tablet Comments:   Reason for Stopping:             Allergies   Allergies   Allergen Reactions    Contrast Dye Angioedema     Hives and breathing issues    Fish-Derived Products Hives    Seafood Hives    Adhesive Tape Hives     Primipore dressing causes hives    Gadolinium     Iodinated Contrast Media

## 2024-12-10 NOTE — TELEPHONE ENCOUNTER
"Daniel Tom MD Thompson, Mary, RN  There does not seem anything to suggest that this EGD with MAC would place the patient at \"greater risk\" though she is at higher risk than usual because of her PMH. That said, I don't see anything in her chart that should prevent her from undergoing the procedure. That said, if GI does not feel that it is indicated, they can cancel it.  "

## 2024-12-10 NOTE — PLAN OF CARE
[facility] DISCHARGE NOTE    Patient discharged to home at 445PM via wheel chair. Accompanied by other:transport tech and staff. Discharge instructions reviewed with patient, opportunity offered to ask questions. Prescriptions - None ordered for discharge. All belongings sent with patient.    Suzi Youssef RN

## 2024-12-11 ENCOUNTER — NURSE TRIAGE (OUTPATIENT)
Dept: ONCOLOGY | Facility: CLINIC | Age: 24
End: 2024-12-11

## 2024-12-11 ENCOUNTER — NURSE TRIAGE (OUTPATIENT)
Dept: ONCOLOGY | Facility: CLINIC | Age: 24
End: 2024-12-11
Payer: COMMERCIAL

## 2024-12-11 ENCOUNTER — INFUSION THERAPY VISIT (OUTPATIENT)
Dept: INFUSION THERAPY | Facility: CLINIC | Age: 24
End: 2024-12-11
Attending: PEDIATRICS
Payer: COMMERCIAL

## 2024-12-11 ENCOUNTER — TELEPHONE (OUTPATIENT)
Dept: GASTROENTEROLOGY | Facility: CLINIC | Age: 24
End: 2024-12-11

## 2024-12-11 VITALS
DIASTOLIC BLOOD PRESSURE: 62 MMHG | RESPIRATION RATE: 18 BRPM | TEMPERATURE: 98.8 F | SYSTOLIC BLOOD PRESSURE: 118 MMHG | HEART RATE: 91 BPM | OXYGEN SATURATION: 93 %

## 2024-12-11 DIAGNOSIS — G81.10 SPASTIC HEMIPLEGIA, UNSPECIFIED ETIOLOGY, UNSPECIFIED LATERALITY (H): ICD-10-CM

## 2024-12-11 DIAGNOSIS — D57.00 SICKLE CELL PAIN CRISIS (H): Primary | ICD-10-CM

## 2024-12-11 PROCEDURE — 96375 TX/PRO/DX INJ NEW DRUG ADDON: CPT

## 2024-12-11 PROCEDURE — 96376 TX/PRO/DX INJ SAME DRUG ADON: CPT

## 2024-12-11 PROCEDURE — 250N000013 HC RX MED GY IP 250 OP 250 PS 637: Performed by: PEDIATRICS

## 2024-12-11 PROCEDURE — 250N000011 HC RX IP 250 OP 636: Performed by: PEDIATRICS

## 2024-12-11 PROCEDURE — 258N000003 HC RX IP 258 OP 636: Performed by: PEDIATRICS

## 2024-12-11 PROCEDURE — 96374 THER/PROPH/DIAG INJ IV PUSH: CPT

## 2024-12-11 PROCEDURE — 96361 HYDRATE IV INFUSION ADD-ON: CPT

## 2024-12-11 RX ORDER — KETOROLAC TROMETHAMINE 30 MG/ML
30 INJECTION, SOLUTION INTRAMUSCULAR; INTRAVENOUS ONCE
Status: COMPLETED | OUTPATIENT
Start: 2024-12-11 | End: 2024-12-11

## 2024-12-11 RX ORDER — HEPARIN SODIUM (PORCINE) LOCK FLUSH IV SOLN 100 UNIT/ML 100 UNIT/ML
5 SOLUTION INTRAVENOUS
Status: DISCONTINUED | OUTPATIENT
Start: 2024-12-11 | End: 2024-12-11 | Stop reason: HOSPADM

## 2024-12-11 RX ORDER — DIPHENHYDRAMINE HCL 25 MG
50 CAPSULE ORAL
Status: COMPLETED | OUTPATIENT
Start: 2024-12-11 | End: 2024-12-11

## 2024-12-11 RX ORDER — ONDANSETRON 2 MG/ML
8 INJECTION INTRAMUSCULAR; INTRAVENOUS EVERY 6 HOURS PRN
Status: DISCONTINUED | OUTPATIENT
Start: 2024-12-11 | End: 2024-12-11 | Stop reason: HOSPADM

## 2024-12-11 RX ORDER — HEPARIN SODIUM,PORCINE 10 UNIT/ML
5 VIAL (ML) INTRAVENOUS
Status: CANCELLED | OUTPATIENT
Start: 2025-01-01

## 2024-12-11 RX ORDER — ONDANSETRON 2 MG/ML
8 INJECTION INTRAMUSCULAR; INTRAVENOUS EVERY 6 HOURS PRN
Status: CANCELLED
Start: 2025-01-01

## 2024-12-11 RX ORDER — DIPHENHYDRAMINE HCL 25 MG
50 CAPSULE ORAL
Status: CANCELLED
Start: 2025-01-01

## 2024-12-11 RX ORDER — ONDANSETRON 4 MG/1
8 TABLET, FILM COATED ORAL
Status: CANCELLED
Start: 2025-01-01

## 2024-12-11 RX ORDER — KETOROLAC TROMETHAMINE 30 MG/ML
30 INJECTION, SOLUTION INTRAMUSCULAR; INTRAVENOUS ONCE
Status: CANCELLED
Start: 2025-01-01 | End: 2025-01-01

## 2024-12-11 RX ORDER — HEPARIN SODIUM (PORCINE) LOCK FLUSH IV SOLN 100 UNIT/ML 100 UNIT/ML
5 SOLUTION INTRAVENOUS
Status: CANCELLED | OUTPATIENT
Start: 2025-01-01

## 2024-12-11 RX ADMIN — HYDROMORPHONE HYDROCHLORIDE 1 MG: 1 INJECTION, SOLUTION INTRAMUSCULAR; INTRAVENOUS; SUBCUTANEOUS at 10:45

## 2024-12-11 RX ADMIN — DIPHENHYDRAMINE HYDROCHLORIDE 50 MG: 25 CAPSULE ORAL at 08:41

## 2024-12-11 RX ADMIN — HYDROMORPHONE HYDROCHLORIDE 1 MG: 1 INJECTION, SOLUTION INTRAMUSCULAR; INTRAVENOUS; SUBCUTANEOUS at 08:42

## 2024-12-11 RX ADMIN — SODIUM CHLORIDE, POTASSIUM CHLORIDE, SODIUM LACTATE AND CALCIUM CHLORIDE 1000 ML: 600; 310; 30; 20 INJECTION, SOLUTION INTRAVENOUS at 08:41

## 2024-12-11 RX ADMIN — HYDROMORPHONE HYDROCHLORIDE 1 MG: 1 INJECTION, SOLUTION INTRAMUSCULAR; INTRAVENOUS; SUBCUTANEOUS at 09:44

## 2024-12-11 RX ADMIN — KETOROLAC TROMETHAMINE 30 MG: 30 INJECTION, SOLUTION INTRAMUSCULAR; INTRAVENOUS at 08:53

## 2024-12-11 RX ADMIN — Medication 5 ML: at 11:07

## 2024-12-11 RX ADMIN — ONDANSETRON 8 MG: 2 INJECTION INTRAMUSCULAR; INTRAVENOUS at 08:47

## 2024-12-11 ASSESSMENT — PAIN SCALES - GENERAL: PAINLEVEL_OUTOF10: SEVERE PAIN (7)

## 2024-12-11 NOTE — TELEPHONE ENCOUNTER
Pt calling to report she will be 15 min late to infusion due to traffic. Infusion charge nurse notified.

## 2024-12-11 NOTE — TELEPHONE ENCOUNTER
Caller: RN spoke with patient    Reason for Reschedule/Cancellation   (please be detailed, any staff messages or encounters to note?): No ride      Prior to reschedule please review:  Ordering Provider: Patricia Mantilla CNP   Sedation Determined: MAC  Does patient have any ASC Exclusions, please identify?: low Hgb      Notes on Cancelled Procedure:  Procedure: Upper Endoscopy [EGD]   Date: 12/12/2024  Location: [facility]; [address] [place] [address], [address] Floor, [place] [address]   Surgeon: Franko      Rescheduled: No, patient to reschedule when she has reliable ride.        Did you cancel or rescheduled an EUS procedure? No.

## 2024-12-11 NOTE — TELEPHONE ENCOUNTER
Oncology Nurse Triage - Sickle Cell Pain Crisis:    Situation: Jennifer  calling about Sickle Cell Pain Crisis, requesting to be added on for IV fluids and pain medicine    Background:     Patient's last infusion was 12/10/2024 in ED  Last clinic visit date:11/25/2024 Patricia Mantilla CNP  Does patient have active treatment plan? Yes    Assessment of Symptoms:  Onset/Duration of symptoms: 1 day    Is it typical sickle cell pain? Yes  Location: back  Character: Sharp  Intensity: 7/10    Any radiation of pain, numbness, tingling, weakness, warmth, swelling, discoloration of arms or legs?No    Fever? No  Chest Pain Present: No    Shortness of breath: No    Other home therapies tried: HEAT/HEATING PAD and WARM BATH     Last home medication taken and when: yesterday night    Any Refills Needed?: No    Does patient have transportation & length of time to get to clinic: Yes  20minutes    Recommendations:   [phone]  Patricia Mantilla CNP    0732 Per sam Gomez for Jennifer to come in for IVF/Pain.    0733 Offer for 8:00am infusion appt with [facility], patient in agreement with plan and has own ride.     0735 scheduling request sent to add onto schedule.     Please note, if you are late for your appt, you risk losing your infusion appt as it may delay another patient's infusion who arrived on time.

## 2024-12-11 NOTE — PROGRESS NOTES
Infusion Nursing Note:  Jennifer Cervantes presents today for Fluids and pain medications.    Patient seen by provider today: No   present during visit today: Not Applicable.    Note: Pt presenting with generalized pain due to sickle cell disease. Pain started in her back and now is all over. Rating pain 7/10 upon arrival to infusion. Pain decreased to 4/10 after infusion and pain medications.    Intravenous Access:  Implanted Port.    Treatment Conditions:  Not Applicable.    Post Infusion Assessment:  Patient tolerated infusion without incident.  Site patent and intact, free from redness, edema or discomfort.  Access discontinued per protocol.     Discharge Plan:   Patient and/or family verbalized understanding of discharge instructions and all questions answered.  AVS to patient via [employer].  Patient will return as needed for next appointment.   Patient discharged in stable condition accompanied by: self.  Patient has ride home from infusion center today.     Administrations This Visit       diphenhydrAMINE (BENADRYL) capsule 50 mg       Admin Date  12/11/2024 Action  $Given Dose  50 mg Route  Oral Documented By  Cristela Henry RN              heparin lock flush 100 unit/mL injection 5 mL       Admin Date  12/11/2024 Action  $Given Dose  5 mL Route  Intracatheter Documented By  Cristela Henry RN              HYDROmorphone (DILAUDID) injection 1 mg       Admin Date  12/11/2024 Action  $Given Dose  1 mg Route  Intravenous Documented By  Cristela Henry RN               Admin Date  12/11/2024 Action  $Given Dose  1 mg Route  Intravenous Documented By  Cristela Henry RN               Admin Date  12/11/2024 Action  $Given Dose  1 mg Route  Intravenous Documented By  Cristela Henry RN              ketorolac (TORADOL) injection 30 mg       Admin Date  12/11/2024 Action  $Given Dose  30 mg Route  Intravenous Documented  By  Cristela Henry RN              lactated ringers BOLUS 1,000 mL       Admin Date  12/11/2024 Action  $New Bag Dose  1,000 mL Rate  500 mL/hr Route  Intravenous Documented By  Cristela Henry RN              ondansetron (ZOFRAN) injection 8 mg       Admin Date  12/11/2024 Action  $Given Dose  8 mg Route  Intravenous Documented By  Cristela Henry RN              sodium chloride (PF) 0.9% PF flush 3-20 mL       Admin Date  12/11/2024 Action  $Given Dose  20 mL Route  Intracatheter Documented By  Cristela Henry RN                  /81 (BP Location: Left arm, Patient Position: Sitting, Cuff Size: Adult Regular)   Pulse (!) 127   Temp 98.8  F (37.1  C) (Oral)   Resp 18   LMP  (LMP Unknown)   SpO2 92%     Cristela Henry RN

## 2024-12-11 NOTE — TELEPHONE ENCOUNTER
----- Message from Claudia MARTIN sent at 12/11/2024 12:52 PM CST -----  Regarding: Please cancel apt for tomorrow  Hi,  Pt is unable to get someone to bring her into the procedure and be with her afterwards for a ride home. She has asked to have the apt cancelled for tomorrow.  Thanks,  Claudia Abbasi RN  [id number]

## 2024-12-11 NOTE — TELEPHONE ENCOUNTER
Safe to proceed per anesthesia review.    Pre assessment completed for upcoming procedure.   (Please see previous telephone encounter notes for complete details)      Procedure details:    Arrival time and facility location reviewed.    Pre op exam needed? No.    Designated  policy reviewed. Instructed to have someone stay 24  hours post procedure.       Medication review:    Medications reviewed. Please see supporting documentation below. Holding recommendations discussed (if applicable).       Prep for procedure:     Procedure prep instructions reviewed.        Any additional information needed:  N/A      Patient verbalized understanding and had no questions or concerns at this time.      Nadege Rinaldi RN  Endoscopy Procedure Pre Assessment   [phone] option 2

## 2024-12-11 NOTE — TELEPHONE ENCOUNTER
Jennifer is calling to ask this writer to send a message to Patricia to let her know that she does not have a ride to her procedure tomorrow. Her mom had to take her sister for shots, and nobody is able to go with her. She is asking for Patricia's advice and if she should reschedule?    Discussed calling gastro to advise, but Jennifer states she wants Patricia to advise since Patricia scheduled the procedure for her.    Message routed to Care Team.    1213: Per Patricia: For endoscopy, she can either reschedule now (wouldn't likely be on for many months) or I can talk about it with her when I see her next.    1251: Called Jennifer and offered the two options: reschedule now or talk about it when she sees Patricia again. Jennifer would like to talk about this when she sees Patricia again and would like apt for tomorrow cancelled. IB sent to scheduling, endoscopy scheduling pool to cancel apt for tomorrow.    Message routed to Care Team.

## 2024-12-11 NOTE — PATIENT INSTRUCTIONS
Dear Jennifer Cervantes    Thank you for choosing [facility] ([facility] for your infusion.  The following information is a summary of our appointment as well as important reminders.      If you have any questions on your upcoming Specialty Infusion appointments, please call scheduling at [phone].  It was a pleasure taking care of you today.    Sincerely,    [facility]  [address] [place] [address]  [address]  Phone:  [phone]

## 2024-12-12 ENCOUNTER — INFUSION THERAPY VISIT (OUTPATIENT)
Dept: INFUSION THERAPY | Facility: CLINIC | Age: 24
End: 2024-12-12
Attending: PEDIATRICS
Payer: COMMERCIAL

## 2024-12-12 ENCOUNTER — NURSE TRIAGE (OUTPATIENT)
Dept: ONCOLOGY | Facility: CLINIC | Age: 24
End: 2024-12-12

## 2024-12-12 ENCOUNTER — NURSE TRIAGE (OUTPATIENT)
Dept: ONCOLOGY | Facility: CLINIC | Age: 24
End: 2024-12-12
Payer: COMMERCIAL

## 2024-12-12 VITALS — HEART RATE: 79 BPM | DIASTOLIC BLOOD PRESSURE: 78 MMHG | SYSTOLIC BLOOD PRESSURE: 129 MMHG

## 2024-12-12 DIAGNOSIS — D57.00 SICKLE CELL PAIN CRISIS (H): Primary | ICD-10-CM

## 2024-12-12 DIAGNOSIS — I69.351 HEMIPLEGIA AND HEMIPARESIS FOLLOWING CEREBRAL INFARCTION AFFECTING RIGHT DOMINANT SIDE (H): ICD-10-CM

## 2024-12-12 DIAGNOSIS — G81.10 SPASTIC HEMIPLEGIA, UNSPECIFIED ETIOLOGY, UNSPECIFIED LATERALITY (H): ICD-10-CM

## 2024-12-12 LAB
BASOPHILS # BLD AUTO: 0.2 10E3/UL (ref 0–0.2)
BASOPHILS NFR BLD AUTO: 1 %
EOSINOPHIL # BLD AUTO: 0.2 10E3/UL (ref 0–0.7)
EOSINOPHIL NFR BLD AUTO: 1 %
ERYTHROCYTE [DISTWIDTH] IN BLOOD BY AUTOMATED COUNT: 24.2 % (ref 10–15)
HCT VFR BLD AUTO: 21.3 % (ref 35–47)
HGB BLD-MCNC: 7.4 G/DL (ref 11.7–15.7)
IMM GRANULOCYTES # BLD: 0.1 10E3/UL
IMM GRANULOCYTES NFR BLD: 0 %
LYMPHOCYTES # BLD AUTO: 1.9 10E3/UL (ref 0.8–5.3)
LYMPHOCYTES NFR BLD AUTO: 15 %
MCH RBC QN AUTO: 30.3 PG (ref 26.5–33)
MCHC RBC AUTO-ENTMCNC: 34.7 G/DL (ref 31.5–36.5)
MCV RBC AUTO: 87 FL (ref 78–100)
MONOCYTES # BLD AUTO: 1.2 10E3/UL (ref 0–1.3)
MONOCYTES NFR BLD AUTO: 9 %
NEUTROPHILS # BLD AUTO: 9.7 10E3/UL (ref 1.6–8.3)
NEUTROPHILS NFR BLD AUTO: 73 %
NRBC # BLD AUTO: 0.3 10E3/UL
NRBC BLD AUTO-RTO: 2 /100
PLATELET # BLD AUTO: 437 10E3/UL (ref 150–450)
RBC # BLD AUTO: 2.44 10E6/UL (ref 3.8–5.2)
RETICS # AUTO: 0.6 10E6/UL (ref 0.03–0.1)
RETICS/RBC NFR AUTO: 27.6 % (ref 0.5–2)
WBC # BLD AUTO: 13.2 10E3/UL (ref 4–11)

## 2024-12-12 PROCEDURE — 85045 AUTOMATED RETICULOCYTE COUNT: CPT | Performed by: REGISTERED NURSE

## 2024-12-12 PROCEDURE — 85004 AUTOMATED DIFF WBC COUNT: CPT | Performed by: REGISTERED NURSE

## 2024-12-12 PROCEDURE — 258N000003 HC RX IP 258 OP 636: Performed by: PEDIATRICS

## 2024-12-12 PROCEDURE — 250N000011 HC RX IP 250 OP 636: Performed by: PEDIATRICS

## 2024-12-12 PROCEDURE — 36415 COLL VENOUS BLD VENIPUNCTURE: CPT | Performed by: REGISTERED NURSE

## 2024-12-12 PROCEDURE — 250N000013 HC RX MED GY IP 250 OP 250 PS 637: Performed by: PEDIATRICS

## 2024-12-12 RX ORDER — ONDANSETRON 4 MG/1
8 TABLET, FILM COATED ORAL
Status: DISCONTINUED | OUTPATIENT
Start: 2024-12-12 | End: 2024-12-12

## 2024-12-12 RX ORDER — HEPARIN SODIUM (PORCINE) LOCK FLUSH IV SOLN 100 UNIT/ML 100 UNIT/ML
5 SOLUTION INTRAVENOUS
OUTPATIENT
Start: 2025-01-01

## 2024-12-12 RX ORDER — ONDANSETRON 2 MG/ML
8 INJECTION INTRAMUSCULAR; INTRAVENOUS EVERY 6 HOURS PRN
Start: 2025-01-01

## 2024-12-12 RX ORDER — ONDANSETRON 4 MG/1
8 TABLET, FILM COATED ORAL
Start: 2025-01-01

## 2024-12-12 RX ORDER — KETOROLAC TROMETHAMINE 30 MG/ML
30 INJECTION, SOLUTION INTRAMUSCULAR; INTRAVENOUS ONCE
Status: COMPLETED | OUTPATIENT
Start: 2024-12-12 | End: 2024-12-12

## 2024-12-12 RX ORDER — DIPHENHYDRAMINE HCL 25 MG
50 CAPSULE ORAL
Status: COMPLETED | OUTPATIENT
Start: 2024-12-12 | End: 2024-12-12

## 2024-12-12 RX ORDER — DIPHENHYDRAMINE HCL 25 MG
50 CAPSULE ORAL
Start: 2025-01-01

## 2024-12-12 RX ORDER — KETOROLAC TROMETHAMINE 30 MG/ML
30 INJECTION, SOLUTION INTRAMUSCULAR; INTRAVENOUS ONCE
Start: 2025-01-01 | End: 2025-01-01

## 2024-12-12 RX ORDER — HEPARIN SODIUM,PORCINE 10 UNIT/ML
5 VIAL (ML) INTRAVENOUS
OUTPATIENT
Start: 2025-01-01

## 2024-12-12 RX ORDER — ONDANSETRON 2 MG/ML
8 INJECTION INTRAMUSCULAR; INTRAVENOUS EVERY 6 HOURS PRN
Status: DISCONTINUED | OUTPATIENT
Start: 2024-12-12 | End: 2024-12-12 | Stop reason: HOSPADM

## 2024-12-12 RX ORDER — HEPARIN SODIUM (PORCINE) LOCK FLUSH IV SOLN 100 UNIT/ML 100 UNIT/ML
5 SOLUTION INTRAVENOUS
Status: DISCONTINUED | OUTPATIENT
Start: 2024-12-12 | End: 2024-12-12 | Stop reason: HOSPADM

## 2024-12-12 RX ADMIN — ONDANSETRON 8 MG: 2 INJECTION INTRAMUSCULAR; INTRAVENOUS at 10:16

## 2024-12-12 RX ADMIN — HEPARIN 5 ML: 100 SYRINGE at 12:31

## 2024-12-12 RX ADMIN — HYDROMORPHONE HYDROCHLORIDE 1 MG: 1 INJECTION, SOLUTION INTRAMUSCULAR; INTRAVENOUS; SUBCUTANEOUS at 11:02

## 2024-12-12 RX ADMIN — HYDROMORPHONE HYDROCHLORIDE 1 MG: 1 INJECTION, SOLUTION INTRAMUSCULAR; INTRAVENOUS; SUBCUTANEOUS at 11:58

## 2024-12-12 RX ADMIN — DIPHENHYDRAMINE HYDROCHLORIDE 50 MG: 25 CAPSULE ORAL at 10:16

## 2024-12-12 RX ADMIN — HYDROMORPHONE HYDROCHLORIDE 1 MG: 1 INJECTION, SOLUTION INTRAMUSCULAR; INTRAVENOUS; SUBCUTANEOUS at 10:03

## 2024-12-12 RX ADMIN — KETOROLAC TROMETHAMINE 30 MG: 30 INJECTION, SOLUTION INTRAMUSCULAR; INTRAVENOUS at 10:08

## 2024-12-12 RX ADMIN — SODIUM CHLORIDE, POTASSIUM CHLORIDE, SODIUM LACTATE AND CALCIUM CHLORIDE 1000 ML: 600; 310; 30; 20 INJECTION, SOLUTION INTRAVENOUS at 10:07

## 2024-12-12 ASSESSMENT — PAIN SCALES - GENERAL
PAINLEVEL_OUTOF10: SEVERE PAIN (7)
PAINLEVEL_OUTOF10: SEVERE PAIN (6)

## 2024-12-12 NOTE — TELEPHONE ENCOUNTER
Oncology Nurse Triage - Sickle Cell Pain Crisis:    Situation: Jennifer  calling about Sickle Cell Pain Crisis, requesting to be added on for IV fluids and pain medicine    Background:     Patient's last infusion was 12/11/24  Last clinic visit date:11/25/24 Patricia Jose Rafael   Does patient have active treatment plan? Yes    Assessment of Symptoms:  Onset/Duration of symptoms: 1 day    Is it typical sickle cell pain? Yes  Location: back   Character: Sharp  Intensity: 8/10    Any radiation of pain, numbness, tingling, weakness, warmth, swelling, discoloration of arms or legs?No    Fever? No  (if yes max temperature recorded in last 24 hours):      Chest Pain Present: No    Shortness of breath: No    Other home therapies tried: HEAT/HEATING PAD and WARM BATH     Last home medication taken and when: ibuprofen and oxycodone 10pm     Any Refills Needed?: No    Does patient have transportation & length of time to get to clinic: Yes is 10 mins away       Recommendations:     Paged Patricia Mantilla to see if OK if gets another infusion today       [facility] can take at 9:30.     Call placed to Jennifer to let her know that she can come [facility]PM     Message sent to scheduling to set up appt     If you do not hear from the infusion center by 2pm then you will not be able to get in for an infusion today. If symptoms worsen while waiting for call back, and/or you experience fever, chills, SOB, chest pain, cough, n/v, dizziness, numbness, swelling, discoloration of extremities, then seek emergency evaluation in Emergency Department.     Please note, if you are late for your appt, you risk losing your infusion appt as it may delay another patient's infusion who arrived on time.

## 2024-12-12 NOTE — TELEPHONE ENCOUNTER
Erroneous encounter wrong type of encounter chosen.   Another refill encounter was sent to Patricia Mantilla

## 2024-12-12 NOTE — PROGRESS NOTES
Infusion Nursing Note:  Jennifer Cervantes presents today for   Chief Complaint   Patient presents with    Infusion     Sickle cell pain treatment       Patient seen by provider today: No   present during visit today: No    Note:   -Orders from Eric Duncan MD completed. Frequency: as needed and approved by triage; patient's last infusion was 12/11/24.  -Labs were drawn via port by RN.  -Pain at start of appt 10/10, legs/back. Pain at discharge 5/10.    -1L LR IV over 2hrs  -50mg Benadryl PO  -8mg Zofran IVP  -30mg Toradol IVP  -1mg Dilaudid IVP q1hr x3    -Jennifer asked for provider to be contacted regarding checking hemoglobin as she has been feeling extremely fatigued. Dr. Mantilla gave TORB to draw CBC w/diff and retic.    Intravenous Access:  Lt chest port accessed by RN. Gauze and tape dressing used due to patient allergy to other adhesives.    Treatment Conditions:  Not Applicable.    Post Infusion Assessment:  Patient tolerated infusion without incident.  Blood return noted pre and post infusion.  Site patent and intact, free from redness, edema or discomfort.  No evidence of extravasations.  Access discontinued per protocol.     Discharge Plan:   Discharge instructions reviewed with: Patient.  Patient and/or family verbalized understanding of discharge instructions and all questions answered.  AVS to patient via [employer].     Patient discharged in stable condition accompanied by: friend.  Departure Mode: Ambulatory.    Future Appointments   Date Time Provider Department Center   1/2/2025 10:00 AM  [facility] INFUSION NURSE [facility]   1/30/2025 10:00 AM  [facility] INFUSION NURSE [facility]   2/27/2025 12:30 PM  ONC INFUSION NURSE [facility]       Roz Kaur RN    /78   Pulse 79   LMP  (LMP Unknown)       Administrations This Visit       diphenhydrAMINE (BENADRYL) capsule 50 mg       Admin Date  12/12/2024 Action  $Given Dose  50 mg Route  Oral Documented By  Roz Kaur, JOE               heparin lock flush 100 unit/mL injection 5 mL       Admin Date  12/12/2024 Action  $Given Dose  5 mL Route  Intracatheter Documented By  Roz Arellano RN              HYDROmorphone (DILAUDID) injection 1 mg       Admin Date  12/12/2024 Action  $Given Dose  1 mg Route  Intravenous Documented By  Roz Kaur RN               Admin Date  12/12/2024 Action  $Given Dose  1 mg Route  Intravenous Documented By  Roz Kaur RN               Admin Date  12/12/2024 Action  $Given Dose  1 mg Route  Intravenous Documented By  Roz Kaur, JOE              ketorolac (TORADOL) injection 30 mg       Admin Date  12/12/2024 Action  $Given Dose  30 mg Route  Intravenous Documented By  Roz Kaur, JOE              lactated ringers BOLUS 1,000 mL       Admin Date  12/12/2024 Action  $New Bag Dose  1,000 mL Rate  500 mL/hr Route  Intravenous Documented By  Roz Kaur, JOE              ondansetron (ZOFRAN) injection 8 mg       Admin Date  12/12/2024 Action  $Given Dose  8 mg Route  Intravenous Documented By  Roz Kaur, JOE

## 2024-12-13 ENCOUNTER — HOSPITAL ENCOUNTER (EMERGENCY)
Facility: CLINIC | Age: 24
Discharge: HOME | End: 2024-12-13
Attending: EMERGENCY MEDICINE | Admitting: EMERGENCY MEDICINE
Payer: COMMERCIAL

## 2024-12-13 ENCOUNTER — PATIENT OUTREACH (OUTPATIENT)
Dept: CARE COORDINATION | Facility: CLINIC | Age: 24
End: 2024-12-13

## 2024-12-13 VITALS
SYSTOLIC BLOOD PRESSURE: 122 MMHG | DIASTOLIC BLOOD PRESSURE: 73 MMHG | HEART RATE: 77 BPM | TEMPERATURE: 98.2 F | BODY MASS INDEX: 26.46 KG/M2 | OXYGEN SATURATION: 92 % | HEIGHT: 64 IN | RESPIRATION RATE: 18 BRPM | WEIGHT: 155 LBS

## 2024-12-13 DIAGNOSIS — D57.00 SICKLE CELL PAIN CRISIS (H): ICD-10-CM

## 2024-12-13 LAB
BASOPHILS # BLD AUTO: 0.2 10E3/UL (ref 0–0.2)
BASOPHILS NFR BLD AUTO: 1 %
EOSINOPHIL # BLD AUTO: 0.3 10E3/UL (ref 0–0.7)
EOSINOPHIL NFR BLD AUTO: 3 %
ERYTHROCYTE [DISTWIDTH] IN BLOOD BY AUTOMATED COUNT: 22.9 % (ref 10–15)
HCT VFR BLD AUTO: 20.5 % (ref 35–47)
HGB BLD-MCNC: 7.3 G/DL (ref 11.7–15.7)
IMM GRANULOCYTES # BLD: 0 10E3/UL
IMM GRANULOCYTES NFR BLD: 0 %
LYMPHOCYTES # BLD AUTO: 2.1 10E3/UL (ref 0.8–5.3)
LYMPHOCYTES NFR BLD AUTO: 19 %
MCH RBC QN AUTO: 30.7 PG (ref 26.5–33)
MCHC RBC AUTO-ENTMCNC: 35.6 G/DL (ref 31.5–36.5)
MCV RBC AUTO: 86 FL (ref 78–100)
MONOCYTES # BLD AUTO: 1.2 10E3/UL (ref 0–1.3)
MONOCYTES NFR BLD AUTO: 11 %
NEUTROPHILS # BLD AUTO: 7.1 10E3/UL (ref 1.6–8.3)
NEUTROPHILS NFR BLD AUTO: 65 %
NRBC # BLD AUTO: 0.2 10E3/UL
NRBC BLD AUTO-RTO: 2 /100
PLATELET # BLD AUTO: 439 10E3/UL (ref 150–450)
RBC # BLD AUTO: 2.38 10E6/UL (ref 3.8–5.2)
RETICS # AUTO: 0.54 10E6/UL (ref 0.03–0.1)
RETICS/RBC NFR AUTO: 22.5 % (ref 0.5–2)
WBC # BLD AUTO: 10.9 10E3/UL (ref 4–11)

## 2024-12-13 PROCEDURE — 96376 TX/PRO/DX INJ SAME DRUG ADON: CPT | Performed by: EMERGENCY MEDICINE

## 2024-12-13 PROCEDURE — 99284 EMERGENCY DEPT VISIT MOD MDM: CPT | Mod: 25 | Performed by: EMERGENCY MEDICINE

## 2024-12-13 PROCEDURE — 85025 COMPLETE CBC W/AUTO DIFF WBC: CPT | Performed by: EMERGENCY MEDICINE

## 2024-12-13 PROCEDURE — 96375 TX/PRO/DX INJ NEW DRUG ADDON: CPT | Performed by: EMERGENCY MEDICINE

## 2024-12-13 PROCEDURE — 250N000011 HC RX IP 250 OP 636: Performed by: EMERGENCY MEDICINE

## 2024-12-13 PROCEDURE — 99284 EMERGENCY DEPT VISIT MOD MDM: CPT | Performed by: EMERGENCY MEDICINE

## 2024-12-13 PROCEDURE — 96374 THER/PROPH/DIAG INJ IV PUSH: CPT | Performed by: EMERGENCY MEDICINE

## 2024-12-13 PROCEDURE — 36415 COLL VENOUS BLD VENIPUNCTURE: CPT | Performed by: EMERGENCY MEDICINE

## 2024-12-13 PROCEDURE — 85045 AUTOMATED RETICULOCYTE COUNT: CPT | Performed by: EMERGENCY MEDICINE

## 2024-12-13 RX ORDER — KETOROLAC TROMETHAMINE 30 MG/ML
30 INJECTION, SOLUTION INTRAMUSCULAR; INTRAVENOUS ONCE
Status: COMPLETED | OUTPATIENT
Start: 2024-12-13 | End: 2024-12-13

## 2024-12-13 RX ORDER — ONDANSETRON 2 MG/ML
8 INJECTION INTRAMUSCULAR; INTRAVENOUS ONCE
Status: COMPLETED | OUTPATIENT
Start: 2024-12-13 | End: 2024-12-13

## 2024-12-13 RX ORDER — HEPARIN SODIUM (PORCINE) LOCK FLUSH IV SOLN 100 UNIT/ML 100 UNIT/ML
5 SOLUTION INTRAVENOUS ONCE
Status: COMPLETED | OUTPATIENT
Start: 2024-12-13 | End: 2024-12-13

## 2024-12-13 RX ADMIN — KETOROLAC TROMETHAMINE 30 MG: 30 INJECTION, SOLUTION INTRAMUSCULAR at 03:46

## 2024-12-13 RX ADMIN — HEPARIN 5 ML: 100 SYRINGE at 09:22

## 2024-12-13 RX ADMIN — HYDROMORPHONE HYDROCHLORIDE 1 MG: 1 INJECTION, SOLUTION INTRAMUSCULAR; INTRAVENOUS; SUBCUTANEOUS at 04:58

## 2024-12-13 RX ADMIN — HYDROMORPHONE HYDROCHLORIDE 1 MG: 1 INJECTION, SOLUTION INTRAMUSCULAR; INTRAVENOUS; SUBCUTANEOUS at 06:09

## 2024-12-13 RX ADMIN — HYDROMORPHONE HYDROCHLORIDE 1 MG: 1 INJECTION, SOLUTION INTRAMUSCULAR; INTRAVENOUS; SUBCUTANEOUS at 07:23

## 2024-12-13 RX ADMIN — ONDANSETRON 8 MG: 2 INJECTION INTRAMUSCULAR; INTRAVENOUS at 03:38

## 2024-12-13 ASSESSMENT — ACTIVITIES OF DAILY LIVING (ADL)
ADLS_ACUITY_SCORE: 59
ADLS_ACUITY_SCORE: 58
ADLS_ACUITY_SCORE: 59
ADLS_ACUITY_SCORE: 58
ADLS_ACUITY_SCORE: 58

## 2024-12-13 NOTE — ED TRIAGE NOTES
Bilateral lower extremity pain.     Triage Assessment (Adult)       Row Name 12/13/24 0233          Triage Assessment    Airway WDL WDL        Respiratory WDL    Respiratory WDL WDL        Skin Circulation/Temperature WDL    Skin Circulation/Temperature WDL WDL        Cardiac WDL    Cardiac WDL WDL        Peripheral/Neurovascular WDL    Peripheral Neurovascular WDL WDL        Cognitive/Neuro/Behavioral WDL    Cognitive/Neuro/Behavioral WDL WDL        [place] Coma Scale    Best Eye Response 4-->(E4) spontaneous     Best Motor Response 6-->(M6) obeys commands     Best Verbal Response 5-->(V5) oriented     [place] Coma Scale Score 15

## 2024-12-13 NOTE — ED PROVIDER NOTES
ED Provider Note  [facility]      History     Chief Complaint   Patient presents with    Sickle Cell Pain Crisis     HPI  Jennifer Cervantes is a 24 year old female with past medical history of sickle cell disease who presents with sickle cell pain crisis.  Patient reports bilateral leg pain.  She thinks she left a window in her room open causing a draft which triggered her pain.  She has otherwise been healthy without fever, cough, chest pain, shortness of breath.  Denies injury, joint swelling, weakness.      Past Medical History  Past Medical History:   Diagnosis Date    Anxiety     Bleeding disorder (H)     Blood clotting disorder (H)     Cerebral infarction (H) 2015    Cognitive developmental delay     low IQ. 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 side (H)     right hand contractures    Iron overload due to repeated red blood cell transfusions     Migraines     Multiple subsegmental pulmonary emboli without acute cor pulmonale (H) 02/01/2021    Oppositional defiant behavior     Presence of intrauterine contraceptive device 2/18/2020    Superficial venous thrombosis of arm, right 03/25/2021    Uncomplicated asthma      Past Surgical History:   Procedure Laterality Date    AS INSERT TUNNELED CV 2 CATH W/O PORT/PUMP      CHOLECYSTECTOMY      CV RIGHT HEART CATH MEASUREMENTS RECORDED N/A 11/18/2021    Procedure: Right Heart Cath;  Surgeon: Jackson Stauffer MD;  Location:  HEART CARDIAC CATH LAB    INSERT PORT VASCULAR ACCESS Left 4/21/2021    Procedure: INSERTION, VASCULAR ACCESS PORT (NOT SURE ON SIDE UNTIL REMOVAL);  Surgeon: Rajan More MD;  Location: [facility] OR    IR CHEST PORT PLACEMENT > 5 YRS OF AGE  4/21/2021    IR CVC NON TUNNEL LINE REMOVAL  5/6/2021    IR CVC NON TUNNEL PLACEMENT > 5 YRS  04/07/2020    IR CVC NON TUNNEL PLACEMENT > 5 YRS  4/30/2021     IR CVC NON TUNNEL PLACEMENT > 5 YRS  9/7/2022    IR PORT REMOVAL LEFT  4/21/2021    REMOVE PORT VASCULAR ACCESS Left 4/21/2021    Procedure: REMOVAL, VASCULAR ACCESS PORT LEFT;  Surgeon: Rajan More MD;  Location: [facility] OR    REPAIR TENDON ELBOW Right 10/02/2019    Procedure: Right Elbow Flexor Lengthening, Flexor Pronator Slide Of Wrist and Finger, Thumb Adductor Lengthening;  Surgeon: Anai Franco MD;  Location: [facility] OR    TONSILLECTOMY Bilateral 10/02/2019    Procedure: Bilateral Tonsillectomy;  Surgeon: Farhana Guy MD;  Location: [facility] OR    ZZC BREAST REDUCTION (INCLUDES LIPO) TIER 3 Bilateral 04/23/2019     gabapentin (NEURONTIN) 300 MG capsule  ibuprofen (ADVIL/MOTRIN) 800 MG tablet  albuterol (PROVENTIL) (2.5 MG/3ML) 0.083% neb solution  aspirin (ASA) 81 MG chewable tablet  budesonide-formoterol (SYMBICORT) 160-4.5 MCG/ACT Inhaler  deferasirox (JADENU) 360 MG tablet  Deferiprone, Twice Daily, 1000 MG TABS  diphenhydrAMINE (BENADRYL) 50 MG capsule  EPINEPHrine (ANY BX GENERIC EQUIV) 0.3 MG/0.3ML injection 2-pack  hydroxyurea (HYDREA) 500 MG capsule  methocarbamol (ROBAXIN) 750 MG tablet  methylPREDNISolone (MEDROL) 32 MG tablet  naloxone (NARCAN) 4 MG/0.1ML nasal spray  oxyCODONE IR (ROXICODONE) 10 MG tablet      Allergies   Allergen Reactions    Contrast Dye Angioedema     Hives and breathing issues    Fish-Derived Products Hives    Seafood Hives    Adhesive Tape Hives     Primipore dressing causes hives    Gadolinium     Iodinated Contrast Media      Family History  Family History   Problem Relation Age of Onset    Sickle Cell Trait Mother     Hypertension Mother     Asthma Mother     Sickle Cell Trait Father     Glaucoma No family hx of     Macular Degeneration No family hx of     Diabetes No family hx of     Gout No family hx of      Social History   Social History     Tobacco Use    Smoking status: Never     Passive exposure: Never    Smokeless tobacco: Never   Substance Use Topics  "   Alcohol use: Not Currently     Alcohol/week: 0.0 standard drinks of alcohol    Drug use: Never      A medically appropriate review of systems was performed with pertinent positives and negatives noted in the HPI, and all other systems negative.    Physical Exam   BP: 113/72  Pulse: 94  Temp: 98.2  F (36.8  C)  Resp: 18  Height: 162.6 cm (5' 4\")  Weight: 70.3 kg (155 lb)  SpO2: 96 %  Physical Exam  Vitals and nursing note reviewed.   Constitutional:       General: She is not in acute distress.     Appearance: Normal appearance. She is well-developed. She is not ill-appearing or diaphoretic.   HENT:      Head: Normocephalic and atraumatic.      Nose: Nose normal.   Eyes:      General: No scleral icterus.     Conjunctiva/sclera: Conjunctivae normal.   Cardiovascular:      Rate and Rhythm: Normal rate.   Pulmonary:      Effort: Pulmonary effort is normal. No respiratory distress.      Breath sounds: No stridor.   Abdominal:      General: There is no distension.   Musculoskeletal:         General: No swelling, deformity or signs of injury. Normal range of motion.      Cervical back: Normal range of motion and neck supple. No rigidity.   Skin:     General: Skin is warm and dry.      Coloration: Skin is not jaundiced or pale.   Neurological:      General: No focal deficit present.      Mental Status: She is alert and oriented to person, place, and time.   Psychiatric:         Mood and Affect: Mood normal.         Behavior: Behavior normal.           ED Course, Procedures, & Data      Procedures                Results for orders placed or performed during the hospital encounter of 12/13/24   Reticulocyte count     Status: Abnormal   Result Value Ref Range    % Reticulocyte 22.5 (H) 0.5 - 2.0 %    Absolute Reticulocyte 0.536 (H) 0.025 - 0.095 10e6/uL   CBC with platelets and differential     Status: Abnormal   Result Value Ref Range    WBC Count 10.9 4.0 - 11.0 10e3/uL    RBC Count 2.38 (L) 3.80 - 5.20 10e6/uL    " Hemoglobin 7.3 (L) 11.7 - 15.7 g/dL    Hematocrit 20.5 (L) 35.0 - 47.0 %    MCV 86 78 - 100 fL    MCH 30.7 26.5 - 33.0 pg    MCHC 35.6 31.5 - 36.5 g/dL    RDW 22.9 (H) 10.0 - 15.0 %    Platelet Count 439 150 - 450 10e3/uL    % Neutrophils 65 %    % Lymphocytes 19 %    % Monocytes 11 %    % Eosinophils 3 %    % Basophils 1 %    % Immature Granulocytes 0 %    NRBCs per 100 WBC 2 (H) <1 /100    Absolute Neutrophils 7.1 1.6 - 8.3 10e3/uL    Absolute Lymphocytes 2.1 0.8 - 5.3 10e3/uL    Absolute Monocytes 1.2 0.0 - 1.3 10e3/uL    Absolute Eosinophils 0.3 0.0 - 0.7 10e3/uL    Absolute Basophils 0.2 0.0 - 0.2 10e3/uL    Absolute Immature Granulocytes 0.0 <=0.4 10e3/uL    Absolute NRBCs 0.2 10e3/uL   CBC with Platelets & Differential     Status: Abnormal    Narrative    The following orders were created for panel order CBC with Platelets & Differential.  Procedure                               Abnormality         Status                     ---------                               -----------         ------                     CBC with platelets and d...[[id number]]  Abnormal            Final result                 Please view results for these tests on the individual orders.     Medications   heparin lock flush 100 unit/mL injection 5 mL (has no administration in time range)   HYDROmorphone (DILAUDID) injection 1 mg (1 mg Intravenous $Given 12/13/24 6521)   ketorolac (TORADOL) injection 30 mg (30 mg Intravenous $Given 12/13/24 9484)   ondansetron (ZOFRAN) injection 8 mg (8 mg Intravenous $Given 12/13/24 1518)     Labs Ordered and Resulted from Time of ED Arrival to Time of ED Departure   RETICULOCYTE COUNT - Abnormal       Result Value    % Reticulocyte 22.5 (*)     Absolute Reticulocyte 0.536 (*)    CBC WITH PLATELETS AND DIFFERENTIAL - Abnormal    WBC Count 10.9      RBC Count 2.38 (*)     Hemoglobin 7.3 (*)     Hematocrit 20.5 (*)     MCV 86      MCH 30.7      MCHC 35.6      RDW 22.9 (*)     Platelet Count 439      %  Neutrophils 65      % Lymphocytes 19      % Monocytes 11      % Eosinophils 3      % Basophils 1      % Immature Granulocytes 0      NRBCs per 100 WBC 2 (*)     Absolute Neutrophils 7.1      Absolute Lymphocytes 2.1      Absolute Monocytes 1.2      Absolute Eosinophils 0.3      Absolute Basophils 0.2      Absolute Immature Granulocytes 0.0      Absolute NRBCs 0.2       No orders to display              Assessment & Plan    Jennifer Cervantes is a 24 year old female with past medical history of sickle cell disease who presents with sickle cell pain crisis.    Patient without infectious symptoms or chest pain.  Leg pain consistent with chronic sickle cell pain which she thinks was triggered by exposure to cold air.  No evidence of acute joint pathology.  Labs with stable hemoglobin and normal white count.  Patient's pain improved after medications per her ED care plan.  Discharged in stable condition with recommendation to follow-up with her hematologist.    I have reviewed the nursing notes. I have reviewed the findings, diagnosis, plan and need for follow up with the patient.    New Prescriptions    No medications on file       Final diagnoses:   Sickle cell pain crisis (H)       Jackie Dale  [facility] EMERGENCY DEPARTMENT  12/13/2024     Jackie Dale MD  12/22/24 0456

## 2024-12-13 NOTE — PROGRESS NOTES
Clinic Care Coordination Contact  [facility]/Voicemail    Clinical Data: Care Coordinator Outreach    Outreach Documentation Number of Outreach Attempt   12/13/2024  12:51 PM 1       Left message on patients voicemail.       Plan: Care Coordinator will try to reach patient again in 1-2 business days.    Becki JOSHUA Community Health Worker  Clinic Care Coordination  [facility]   Clinic #: [phone]  Direct #: [phone]

## 2024-12-16 ENCOUNTER — NURSE TRIAGE (OUTPATIENT)
Dept: ONCOLOGY | Facility: CLINIC | Age: 24
End: 2024-12-16
Payer: COMMERCIAL

## 2024-12-16 ENCOUNTER — HOSPITAL ENCOUNTER (EMERGENCY)
Facility: CLINIC | Age: 24
Discharge: HOME | End: 2024-12-16
Attending: EMERGENCY MEDICINE | Admitting: EMERGENCY MEDICINE
Payer: COMMERCIAL

## 2024-12-16 VITALS
BODY MASS INDEX: 26.46 KG/M2 | HEIGHT: 64 IN | HEART RATE: 86 BPM | SYSTOLIC BLOOD PRESSURE: 104 MMHG | WEIGHT: 155 LBS | RESPIRATION RATE: 12 BRPM | DIASTOLIC BLOOD PRESSURE: 56 MMHG | TEMPERATURE: 98.1 F | OXYGEN SATURATION: 93 %

## 2024-12-16 DIAGNOSIS — D57.00 SICKLE CELL PAIN CRISIS (H): ICD-10-CM

## 2024-12-16 LAB
ALBUMIN SERPL BCG-MCNC: 4.6 G/DL (ref 3.5–5.2)
ALP SERPL-CCNC: 62 U/L (ref 40–150)
ALT SERPL W P-5'-P-CCNC: 31 U/L (ref 0–50)
ANION GAP SERPL CALCULATED.3IONS-SCNC: 11 MMOL/L (ref 7–15)
AST SERPL W P-5'-P-CCNC: 37 U/L (ref 0–45)
BASOPHILS # BLD AUTO: 0.3 10E3/UL (ref 0–0.2)
BASOPHILS NFR BLD AUTO: 2 %
BILIRUB SERPL-MCNC: 2.5 MG/DL
BUN SERPL-MCNC: 5.7 MG/DL (ref 6–20)
CALCIUM SERPL-MCNC: 9 MG/DL (ref 8.8–10.4)
CHLORIDE SERPL-SCNC: 107 MMOL/L (ref 98–107)
CREAT SERPL-MCNC: 0.5 MG/DL (ref 0.51–0.95)
EGFRCR SERPLBLD CKD-EPI 2021: >90 ML/MIN/1.73M2
EOSINOPHIL # BLD AUTO: 0.2 10E3/UL (ref 0–0.7)
EOSINOPHIL NFR BLD AUTO: 1 %
ERYTHROCYTE [DISTWIDTH] IN BLOOD BY AUTOMATED COUNT: 20.8 % (ref 10–15)
GLUCOSE SERPL-MCNC: 87 MG/DL (ref 70–99)
HCG SERPL QL: NEGATIVE
HCO3 SERPL-SCNC: 22 MMOL/L (ref 22–29)
HCT VFR BLD AUTO: 22 % (ref 35–47)
HGB BLD-MCNC: 7.8 G/DL (ref 11.7–15.7)
IMM GRANULOCYTES # BLD: 0.1 10E3/UL
IMM GRANULOCYTES NFR BLD: 1 %
LYMPHOCYTES # BLD AUTO: 2.6 10E3/UL (ref 0.8–5.3)
LYMPHOCYTES NFR BLD AUTO: 18 %
MCH RBC QN AUTO: 30.5 PG (ref 26.5–33)
MCHC RBC AUTO-ENTMCNC: 35.5 G/DL (ref 31.5–36.5)
MCV RBC AUTO: 86 FL (ref 78–100)
MONOCYTES # BLD AUTO: 1.1 10E3/UL (ref 0–1.3)
MONOCYTES NFR BLD AUTO: 8 %
NEUTROPHILS # BLD AUTO: 10.2 10E3/UL (ref 1.6–8.3)
NEUTROPHILS NFR BLD AUTO: 71 %
NRBC # BLD AUTO: 0.2 10E3/UL
NRBC BLD AUTO-RTO: 1 /100
PLATELET # BLD AUTO: 467 10E3/UL (ref 150–450)
POTASSIUM SERPL-SCNC: 3.6 MMOL/L (ref 3.4–5.3)
PROT SERPL-MCNC: 7.9 G/DL (ref 6.4–8.3)
RBC # BLD AUTO: 2.56 10E6/UL (ref 3.8–5.2)
SODIUM SERPL-SCNC: 140 MMOL/L (ref 135–145)
WBC # BLD AUTO: 14.4 10E3/UL (ref 4–11)

## 2024-12-16 PROCEDURE — 96361 HYDRATE IV INFUSION ADD-ON: CPT | Performed by: EMERGENCY MEDICINE

## 2024-12-16 PROCEDURE — 99285 EMERGENCY DEPT VISIT HI MDM: CPT | Performed by: EMERGENCY MEDICINE

## 2024-12-16 PROCEDURE — 84703 CHORIONIC GONADOTROPIN ASSAY: CPT | Performed by: EMERGENCY MEDICINE

## 2024-12-16 PROCEDURE — 82040 ASSAY OF SERUM ALBUMIN: CPT | Performed by: EMERGENCY MEDICINE

## 2024-12-16 PROCEDURE — 99284 EMERGENCY DEPT VISIT MOD MDM: CPT | Mod: 25 | Performed by: EMERGENCY MEDICINE

## 2024-12-16 PROCEDURE — 36415 COLL VENOUS BLD VENIPUNCTURE: CPT | Performed by: EMERGENCY MEDICINE

## 2024-12-16 PROCEDURE — 85004 AUTOMATED DIFF WBC COUNT: CPT | Performed by: EMERGENCY MEDICINE

## 2024-12-16 PROCEDURE — 96375 TX/PRO/DX INJ NEW DRUG ADDON: CPT | Performed by: EMERGENCY MEDICINE

## 2024-12-16 PROCEDURE — 85041 AUTOMATED RBC COUNT: CPT | Performed by: EMERGENCY MEDICINE

## 2024-12-16 PROCEDURE — 258N000003 HC RX IP 258 OP 636: Performed by: EMERGENCY MEDICINE

## 2024-12-16 PROCEDURE — 250N000011 HC RX IP 250 OP 636: Performed by: EMERGENCY MEDICINE

## 2024-12-16 PROCEDURE — 96374 THER/PROPH/DIAG INJ IV PUSH: CPT | Performed by: EMERGENCY MEDICINE

## 2024-12-16 PROCEDURE — 96376 TX/PRO/DX INJ SAME DRUG ADON: CPT | Performed by: EMERGENCY MEDICINE

## 2024-12-16 RX ORDER — KETOROLAC TROMETHAMINE 30 MG/ML
30 INJECTION, SOLUTION INTRAMUSCULAR; INTRAVENOUS ONCE
Status: COMPLETED | OUTPATIENT
Start: 2024-12-16 | End: 2024-12-16

## 2024-12-16 RX ORDER — HEPARIN SODIUM (PORCINE) LOCK FLUSH IV SOLN 100 UNIT/ML 100 UNIT/ML
5 SOLUTION INTRAVENOUS ONCE
Status: COMPLETED | OUTPATIENT
Start: 2024-12-16 | End: 2024-12-16

## 2024-12-16 RX ORDER — HEPARIN SODIUM (PORCINE) LOCK FLUSH IV SOLN 100 UNIT/ML 100 UNIT/ML
3 SOLUTION INTRAVENOUS ONCE
Status: DISCONTINUED | OUTPATIENT
Start: 2024-12-16 | End: 2024-12-16

## 2024-12-16 RX ORDER — SODIUM CHLORIDE, SODIUM LACTATE, POTASSIUM CHLORIDE, CALCIUM CHLORIDE 600; 310; 30; 20 MG/100ML; MG/100ML; MG/100ML; MG/100ML
INJECTION, SOLUTION INTRAVENOUS CONTINUOUS
Status: DISPENSED | OUTPATIENT
Start: 2024-12-16 | End: 2024-12-16

## 2024-12-16 RX ORDER — ONDANSETRON 2 MG/ML
4 INJECTION INTRAMUSCULAR; INTRAVENOUS EVERY 6 HOURS PRN
Status: DISCONTINUED | OUTPATIENT
Start: 2024-12-16 | End: 2024-12-16 | Stop reason: HOSPADM

## 2024-12-16 RX ORDER — HEPARIN SODIUM,PORCINE 10 UNIT/ML
5-10 VIAL (ML) INTRAVENOUS
Status: DISCONTINUED | OUTPATIENT
Start: 2024-12-16 | End: 2024-12-16

## 2024-12-16 RX ORDER — HEPARIN SODIUM,PORCINE 10 UNIT/ML
5-10 VIAL (ML) INTRAVENOUS EVERY 24 HOURS
Status: DISCONTINUED | OUTPATIENT
Start: 2024-12-16 | End: 2024-12-16

## 2024-12-16 RX ADMIN — HYDROMORPHONE HYDROCHLORIDE 1 MG: 1 INJECTION, SOLUTION INTRAMUSCULAR; INTRAVENOUS; SUBCUTANEOUS at 15:49

## 2024-12-16 RX ADMIN — ONDANSETRON 4 MG: 2 INJECTION INTRAMUSCULAR; INTRAVENOUS at 15:49

## 2024-12-16 RX ADMIN — HEPARIN 5 ML: 100 SYRINGE at 19:06

## 2024-12-16 RX ADMIN — HYDROMORPHONE HYDROCHLORIDE 1 MG: 1 INJECTION, SOLUTION INTRAMUSCULAR; INTRAVENOUS; SUBCUTANEOUS at 18:26

## 2024-12-16 RX ADMIN — HYDROMORPHONE HYDROCHLORIDE 1 MG: 1 INJECTION, SOLUTION INTRAMUSCULAR; INTRAVENOUS; SUBCUTANEOUS at 17:12

## 2024-12-16 RX ADMIN — SODIUM CHLORIDE, POTASSIUM CHLORIDE, SODIUM LACTATE AND CALCIUM CHLORIDE: 600; 310; 30; 20 INJECTION, SOLUTION INTRAVENOUS at 16:26

## 2024-12-16 RX ADMIN — KETOROLAC TROMETHAMINE 30 MG: 30 INJECTION, SOLUTION INTRAMUSCULAR at 15:49

## 2024-12-16 ASSESSMENT — ACTIVITIES OF DAILY LIVING (ADL)
ADLS_ACUITY_SCORE: 58

## 2024-12-16 NOTE — DISCHARGE INSTRUCTIONS
Home tonight to rest    Please make an appointment to follow up with Your Primary Care Provider as needed.

## 2024-12-16 NOTE — TELEPHONE ENCOUNTER
Oncology Nurse Triage - Sickle Cell Pain Crisis:    Situation: Jennifer  calling about Sickle Cell Pain Crisis, requesting to be added on for IV fluids and pain medicine    Background:   Patient's last infusion was 12/12/24 in clinic; 12/13/24 in ED  Last clinic visit date:11/25/24  Does patient have active treatment plan? Yes    Assessment of Symptoms:  Onset/Duration of symptoms: 3 day    Is it typical sickle cell pain? Yes  Location: back and legs  Character: Sharp  Intensity: 8/10    Any radiation of pain, numbness, tingling, weakness, warmth, swelling, discoloration of arms or legs?No    Fever? No  (if yes max temperature recorded in last 24 hours):      Chest Pain Present: No    Shortness of breath: No    Other home therapies tried: HEAT/HEATING PAD and WARM BATH     Last home medication taken and when: 10pm Oxycodone and IBU     Any Refills Needed?: No    Does patient have transportation & length of time to get to clinic: Yes- has ride in, can be to clinic in 15 minutes    Recommendations:   [id number] secure chat to Patricia Mantilla for provider approval- okayed by Patricia to come in if appt becomes available.     If you do not hear from the infusion center by 2pm then you will not be able to get in for an infusion today. If symptoms worsen while waiting for call back, and/or you experience fever, chills, SOB, chest pain, cough, n/v, dizziness, numbness, swelling, discoloration of extremities, then seek emergency evaluation in Emergency Department.

## 2024-12-16 NOTE — ED TRIAGE NOTES
Pt presents with sickle cell pain,  attempted to go to infusion center for pain meds and fluids, but didn't  have space. Presents to ER for SCP generalized everywhere

## 2024-12-16 NOTE — ED PROVIDER NOTES
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  Patient states she has had increased pain just generalized in her body over the past 2 days and states that she normally just takes gabapentin and Tylenol.  The patient denies any fevers denies any chest pain or shortness of breath denies any vomiting diarrhea melena or bright blood per rectum.  The patient presents to the ER for evaluation because she could not get into the infusion clinic to get treated.    This part of the document was transcribed by Bambi Tejeda, Medical Scribe.      I have reviewed the Medications, Allergies, Past Medical and Surgical History, and Social History in the [employer] system.    Past Medical History:   Diagnosis Date    Anxiety     Bleeding disorder (H)     Blood clotting disorder (H)     Cerebral infarction (H) 2015    Cognitive developmental delay     low IQ. 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 side (H)     right hand contractures    Iron overload due to repeated red blood cell transfusions     Migraines     Multiple subsegmental pulmonary emboli without acute cor pulmonale (H) 02/01/2021    Oppositional defiant behavior     Presence of intrauterine contraceptive device 2/18/2020    Superficial venous thrombosis of arm, right 03/25/2021    Uncomplicated asthma        Past Surgical History:   Procedure Laterality Date    AS INSERT TUNNELED CV 2 CATH W/O PORT/PUMP      CHOLECYSTECTOMY      CV RIGHT HEART CATH MEASUREMENTS RECORDED N/A 11/18/2021    Procedure: Right Heart  Cath;  Surgeon: Jackson Stauffer MD;  Location:  HEART CARDI[facility] CATH LAB    INSERT PORT VASCULAR ACCESS Left 4/21/2021    Procedure: INSERTION, VASCULAR ACCESS PORT (NOT SURE ON SIDE UNTIL REMOVAL);  Surgeon: Rajan More MD;  Location: [facility] OR    IR CHEST PORT PLACEMENT > 5 YRS OF AGE  4/21/2021    IR CVC NON TUNNEL LINE REMOVAL  5/6/2021    IR CVC NON TUNNEL PLACEMENT > 5 YRS  04/07/2020    IR CVC NON TUNNEL PLACEMENT > 5 YRS  4/30/2021    IR CVC NON TUNNEL PLACEMENT > 5 YRS  9/7/2022    IR PORT REMOVAL LEFT  4/21/2021    REMOVE PORT VASCULAR ACCESS Left 4/21/2021    Procedure: REMOVAL, VASCULAR ACCESS PORT LEFT;  Surgeon: Rajan More MD;  Location: [facility] OR    REPAIR TENDON ELBOW Right 10/02/2019    Procedure: Right Elbow Flexor Lengthening, Flexor Pronator Slide Of Wrist and Finger, Thumb Adductor Lengthening;  Surgeon: Anai Franco MD;  Location: [facility] OR    TONSILLECTOMY Bilateral 10/02/2019    Procedure: Bilateral Tonsillectomy;  Surgeon: Farhana Guy MD;  Location: [facility] OR    ZZC BREAST REDUCTION (INCLUDES LIPO) TIER 3 Bilateral 04/23/2019         Dose / Directions   albuterol (2.5 MG/3ML) 0.083% neb solution  Commonly known as: PROVENTIL  Used for: Asthmatic bronchitis without complication, unspecified asthma severity, unspecified whether persistent      Dose: 5 mg  Take 2 vials (5 mg) by nebulization every 6 hours as needed for shortness of breath or wheezing.  Quantity: 90 mL  Refills: 3     aspirin 81 MG chewable tablet  Commonly known as: ASA  Used for: Superficial venous thrombosis of arm, right      Dose: 81 mg  Take 1 tablet (81 mg) by mouth 2 times daily  Quantity: 60 tablet  Refills: 11     budesonide-formoterol 160-4.5 MCG/ACT Inhaler  Commonly known as: SYMBICORT  Used for: Moderate persistent asthma, unspecified whether complicated      Inhale 2 puffs twice daily plus 1-2 puffs as needed. May use up to 12 puffs per day.  Quantity: 20.4 g  Refills: 11     deferasirox  360 MG tablet  Commonly known as: JADENU  Used for: Iron overload due to repeated red blood cell transfusions      Dose: 1,440 mg  Take 4 tablets (1,440 mg) by mouth daily.  Quantity: 120 tablet  Refills: 11     Deferiprone (Twice Daily) 1000 MG Tabs  Used for: Iron overload due to repeated red blood cell transfusions      Dose: 2,000 mg  Take 2,000 mg by mouth 2 times daily.  Quantity: 150 tablet  Refills: 3     diphenhydrAMINE 50 MG capsule  Commonly known as: BENADRYL  Used for: Sickle cell pain crisis (H)      Administer 12  hours pre - IV contrast injection and 2 hours prior to contrast. Patient must have a .  Quantity: 2 capsule  Refills: 0     EPINEPHrine 0.3 MG/0.3ML injection 2-pack  Commonly known as: ANY BX GENERIC EQUIV  Used for: Anaphylaxis, sequela      Dose: 0.3 mg  Inject 0.3 mLs (0.3 mg) into the muscle as needed for anaphylaxis.  Quantity: 1 each  Refills: 1     gabapentin 300 MG capsule  Commonly known as: NEURONTIN  Used for: Other chronic pain      Dose: 300 mg  Take 1 capsule (300 mg) by mouth 3 times daily. Take PO 1 pill in evening (300 mg) TID to start. If tolerated, increase by 300 mg in morning or lunch every few days, updating your doctor's office in time to get refills. The maximum dose is 900 mg TID.  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 Okay to take scheduled while in pain).  Quantity: 60 tablet  Refills: 1    "  methylPREDNISolone 32 MG tablet  Commonly known as: Medrol  Used for: Sickle cell pain crisis (H)      Take 1 tab 12 hours before appointment and 1 tab 2 hours prior to the procedure with IV contrast.  Quantity: 2 tablet  Refills: 0     naloxone 4 MG/0.1ML nasal spray  Commonly known as: NARCAN      Dose: 4 mg  Spray 4 mg into one nostril alternating nostrils as needed for opioid reversal. every 2-3 minutes until assistance arrives  Quantity: 0.2 mL  Refills: 0              Past medical history, past surgical history, medications, and allergies were reviewed with the patient. Additional pertinent items: None    Family History   Problem Relation Age of Onset    Sickle Cell Trait Mother     Hypertension Mother     Asthma Mother     Sickle Cell Trait Father     Glaucoma No family hx of     Macular Degeneration No family hx of     Diabetes No family hx of     Gout No family hx of        Social History     Tobacco Use    Smoking status: Never     Passive exposure: Never    Smokeless tobacco: Never   Substance Use Topics    Alcohol use: Not Currently     Alcohol/week: 0.0 standard drinks of alcohol     Social history was reviewed with the patient. Additional pertinent items: None    Allergies   Allergen Reactions    Contrast Dye Angioedema     Hives and breathing issues    Fish-Derived Products Hives    Seafood Hives    Adhesive Tape Hives     Primipore dressing causes hives    Gadolinium     Iodinated Contrast Media        Review of Systems  A medically appropriate review of systems was performed with pertinent positives and negatives noted in the HPI, and all other systems negative.    Physical Exam   BP: 121/80  Pulse: 78  Temp: 98.1  F (36.7  C)  Resp: 14  Height: 162.6 cm (5' 4\")  Weight: 70.3 kg (155 lb)  SpO2: 95 %      Physical Exam  Vitals and nursing note reviewed.   Constitutional:       Appearance: She is not ill-appearing or diaphoretic.   HENT:      Head: Atraumatic.   Eyes:      Extraocular Movements: " Extraocular movements intact.      Pupils: Pupils are equal, round, and reactive to light.   Cardiovascular:      Rate and Rhythm: Regular rhythm.   Pulmonary:      Breath sounds: Normal breath sounds.   Musculoskeletal:      Cervical back: Neck supple.   Neurological:      Mental Status: She is alert and oriented to person, place, and time.      Comments: Status post CVA   Psychiatric:         Mood and Affect: Mood normal.         ED Course        Procedures         Medications   lactated ringers infusion ( Intravenous Not Given 12/16/24 1629)   HYDROmorphone (DILAUDID) injection 1 mg (1 mg Intravenous $Given 12/16/24 1712)   ondansetron (ZOFRAN) injection 4 mg (4 mg Intravenous $Given 12/16/24 1549)   ketorolac (TORADOL) injection 30 mg (30 mg Intravenous $Given 12/16/24 1549)         Results for orders placed or performed during the hospital encounter of 12/16/24 (from the past 24 hours)   CBC with platelets differential    Narrative    The following orders were created for panel order CBC with platelets differential.  Procedure                               Abnormality         Status                     ---------                               -----------         ------                     CBC with platelets and d...[[id number]]  Abnormal            Final result                 Please view results for these tests on the individual orders.   Comprehensive metabolic panel   Result Value Ref Range    Sodium 140 135 - 145 mmol/L    Potassium 3.6 3.4 - 5.3 mmol/L    Carbon Dioxide (CO2) 22 22 - 29 mmol/L    Anion Gap 11 7 - 15 mmol/L    Urea Nitrogen 5.7 (L) 6.0 - 20.0 mg/dL    Creatinine 0.50 (L) 0.51 - 0.95 mg/dL    GFR Estimate >90 >60 mL/min/1.73m2    Calcium 9.0 8.8 - 10.4 mg/dL    Chloride 107 98 - 107 mmol/L    Glucose 87 70 - 99 mg/dL    Alkaline Phosphatase 62 40 - 150 U/L    AST 37 0 - 45 U/L    ALT 31 0 - 50 U/L    Protein Total 7.9 6.4 - 8.3 g/dL    Albumin 4.6 3.5 - 5.2 g/dL    Bilirubin Total 2.5 (H)  <=1.2 mg/dL   HCG qualitative pregnancy (blood)   Result Value Ref Range    hCG Serum Qualitative Negative Negative   CBC with platelets and differential   Result Value Ref Range    WBC Count 14.4 (H) 4.0 - 11.0 10e3/uL    RBC Count 2.56 (L) 3.80 - 5.20 10e6/uL    Hemoglobin 7.8 (L) 11.7 - 15.7 g/dL    Hematocrit 22.0 (L) 35.0 - 47.0 %    MCV 86 78 - 100 fL    MCH 30.5 26.5 - 33.0 pg    MCHC 35.5 31.5 - 36.5 g/dL    RDW 20.8 (H) 10.0 - 15.0 %    Platelet Count 467 (H) 150 - 450 10e3/uL    % Neutrophils 71 %    % Lymphocytes 18 %    % Monocytes 8 %    % Eosinophils 1 %    % Basophils 2 %    % Immature Granulocytes 1 %    NRBCs per 100 WBC 1 (H) <1 /100    Absolute Neutrophils 10.2 (H) 1.6 - 8.3 10e3/uL    Absolute Lymphocytes 2.6 0.8 - 5.3 10e3/uL    Absolute Monocytes 1.1 0.0 - 1.3 10e3/uL    Absolute Eosinophils 0.2 0.0 - 0.7 10e3/uL    Absolute Basophils 0.3 (H) 0.0 - 0.2 10e3/uL    Absolute Immature Granulocytes 0.1 <=0.4 10e3/uL    Absolute NRBCs 0.2 10e3/uL     *Note: Due to a large number of results and/or encounters for the requested time period, some results have not been displayed. A complete set of results can be found in Results Review.              Critical care was not performed.     Medical Decision Making  The patient's presentation was of moderate complexity (a chronic illness mild to moderate exacerbation, progression, or side effect of treatment).    The patient's evaluation involved:  ordering and/or review of 3+ test(s) in this encounter (see above)    The patient's management necessitated high risk (a parenteral controlled substance).      Assessments & Plan (with Medical Decision Making)     I have reviewed the nursing notes.    Patient's labs at baseline and patient improved with her treatment plan.  At this time the patient will be sent home    I have reviewed the findings, diagnosis, plan and need for follow up with the patient.      Final diagnoses:   Sickle cell pain crisis (H)     Home  tonight to rest    Please make an appointment to follow up with Your Primary Care Provider as needed.    CLAIRE KEARNS MD    12/16/2024   [facility] EMERGENCY DEPARTMENT          Claire Kearns MD  12/16/24 9849

## 2024-12-16 NOTE — TELEPHONE ENCOUNTER
Jennifer calling to check status of infusion request.     As of 0953 There are no appts available, pt is aware that triage will call back if anything opens up for today.     1133 Jennifer called checking in on status. As of present time, no [facility] infusion openings available.  Patient is willing to drive farther distance for an infusion appt if available.        1150 Per sam Krishnamurthy to offer patient IVF/pain at [facility].     1153 Offer for IVF/Pain appt at [place] at 1:00pm, patient is checking with ride about if able to make appt time. Will call back triage in about 5minutes with answer.       1202 Per Jennifer, declining offer for [facility] for 1:00pm today due to a scheduling ride conflict.  Pt is aware that could go to ED if needed if no local appts open up for pt today.

## 2024-12-18 ENCOUNTER — INFUSION THERAPY VISIT (OUTPATIENT)
Dept: INFUSION THERAPY | Facility: CLINIC | Age: 24
End: 2024-12-18
Attending: PEDIATRICS
Payer: COMMERCIAL

## 2024-12-18 ENCOUNTER — NURSE TRIAGE (OUTPATIENT)
Dept: ONCOLOGY | Facility: CLINIC | Age: 24
End: 2024-12-18
Payer: COMMERCIAL

## 2024-12-18 VITALS
RESPIRATION RATE: 16 BRPM | TEMPERATURE: 98.2 F | DIASTOLIC BLOOD PRESSURE: 74 MMHG | SYSTOLIC BLOOD PRESSURE: 122 MMHG | HEART RATE: 97 BPM | OXYGEN SATURATION: 93 %

## 2024-12-18 DIAGNOSIS — D57.00 SICKLE CELL PAIN CRISIS (H): Primary | ICD-10-CM

## 2024-12-18 DIAGNOSIS — G81.10 SPASTIC HEMIPLEGIA, UNSPECIFIED ETIOLOGY, UNSPECIFIED LATERALITY (H): ICD-10-CM

## 2024-12-18 PROCEDURE — 258N000003 HC RX IP 258 OP 636: Performed by: PEDIATRICS

## 2024-12-18 PROCEDURE — 96361 HYDRATE IV INFUSION ADD-ON: CPT

## 2024-12-18 PROCEDURE — 250N000013 HC RX MED GY IP 250 OP 250 PS 637: Performed by: PEDIATRICS

## 2024-12-18 PROCEDURE — 96376 TX/PRO/DX INJ SAME DRUG ADON: CPT

## 2024-12-18 PROCEDURE — 250N000011 HC RX IP 250 OP 636: Performed by: PEDIATRICS

## 2024-12-18 PROCEDURE — 96374 THER/PROPH/DIAG INJ IV PUSH: CPT

## 2024-12-18 PROCEDURE — 96375 TX/PRO/DX INJ NEW DRUG ADDON: CPT

## 2024-12-18 RX ORDER — DIPHENHYDRAMINE HCL 25 MG
50 CAPSULE ORAL
Status: COMPLETED | OUTPATIENT
Start: 2024-12-18 | End: 2024-12-18

## 2024-12-18 RX ORDER — ONDANSETRON 2 MG/ML
8 INJECTION INTRAMUSCULAR; INTRAVENOUS EVERY 6 HOURS PRN
Status: DISCONTINUED | OUTPATIENT
Start: 2024-12-18 | End: 2024-12-18 | Stop reason: HOSPADM

## 2024-12-18 RX ORDER — KETOROLAC TROMETHAMINE 30 MG/ML
30 INJECTION, SOLUTION INTRAMUSCULAR; INTRAVENOUS ONCE
Status: CANCELLED
Start: 2025-01-01 | End: 2025-01-01

## 2024-12-18 RX ORDER — HEPARIN SODIUM,PORCINE 10 UNIT/ML
5 VIAL (ML) INTRAVENOUS
OUTPATIENT
Start: 2025-01-01

## 2024-12-18 RX ORDER — ONDANSETRON 4 MG/1
8 TABLET, FILM COATED ORAL
Start: 2025-01-01

## 2024-12-18 RX ORDER — HEPARIN SODIUM (PORCINE) LOCK FLUSH IV SOLN 100 UNIT/ML 100 UNIT/ML
5 SOLUTION INTRAVENOUS
Status: CANCELLED | OUTPATIENT
Start: 2025-01-01

## 2024-12-18 RX ORDER — DIPHENHYDRAMINE HCL 25 MG
50 CAPSULE ORAL
Status: CANCELLED
Start: 2025-01-01

## 2024-12-18 RX ORDER — KETOROLAC TROMETHAMINE 30 MG/ML
30 INJECTION, SOLUTION INTRAMUSCULAR; INTRAVENOUS ONCE
Status: COMPLETED | OUTPATIENT
Start: 2024-12-18 | End: 2024-12-18

## 2024-12-18 RX ORDER — ONDANSETRON 2 MG/ML
8 INJECTION INTRAMUSCULAR; INTRAVENOUS EVERY 6 HOURS PRN
Status: CANCELLED
Start: 2025-01-01

## 2024-12-18 RX ORDER — HEPARIN SODIUM (PORCINE) LOCK FLUSH IV SOLN 100 UNIT/ML 100 UNIT/ML
5 SOLUTION INTRAVENOUS
Status: DISCONTINUED | OUTPATIENT
Start: 2024-12-18 | End: 2024-12-18 | Stop reason: HOSPADM

## 2024-12-18 RX ADMIN — HEPARIN 5 ML: 100 SYRINGE at 13:58

## 2024-12-18 RX ADMIN — HYDROMORPHONE HYDROCHLORIDE 1 MG: 1 INJECTION, SOLUTION INTRAMUSCULAR; INTRAVENOUS; SUBCUTANEOUS at 13:41

## 2024-12-18 RX ADMIN — ONDANSETRON 8 MG: 2 INJECTION INTRAMUSCULAR; INTRAVENOUS at 11:24

## 2024-12-18 RX ADMIN — KETOROLAC TROMETHAMINE 30 MG: 30 INJECTION, SOLUTION INTRAMUSCULAR; INTRAVENOUS at 11:22

## 2024-12-18 RX ADMIN — SODIUM CHLORIDE, POTASSIUM CHLORIDE, SODIUM LACTATE AND CALCIUM CHLORIDE 1000 ML: 600; 310; 30; 20 INJECTION, SOLUTION INTRAVENOUS at 11:14

## 2024-12-18 RX ADMIN — DIPHENHYDRAMINE HYDROCHLORIDE 50 MG: 25 CAPSULE ORAL at 11:19

## 2024-12-18 RX ADMIN — HYDROMORPHONE HYDROCHLORIDE 1 MG: 1 INJECTION, SOLUTION INTRAMUSCULAR; INTRAVENOUS; SUBCUTANEOUS at 11:19

## 2024-12-18 RX ADMIN — HYDROMORPHONE HYDROCHLORIDE 1 MG: 1 INJECTION, SOLUTION INTRAMUSCULAR; INTRAVENOUS; SUBCUTANEOUS at 12:36

## 2024-12-18 NOTE — PROGRESS NOTES
Nursing Note  Jennifer Cervantes [facility] for:   Chief Complaint   Patient presents with    Infusion     IVF/IV meds     During today's [facility] appointment, orders from IVF/IV meds were completed.  Frequency: as needed    Progress note:  Patient identification verified by name and date of birth.  Assessment completed.  Vitals recorded in Doc Flowsheets.  Patient was provided with education regarding medication/procedure and possible side effects.  Patient verbalized understanding.     present during visit today: Not Applicable.    Treatment Conditions: Non-applicable.      Infusion length and rate:  infusion given over approximately  2 hours    Labs: were not ordered for this appointment.    Vascular access: port accessed today.    Is the next appt scheduled? None with infusion. prn    Post Infusion Assessment:  Patient tolerated infusion without incident.     Discharge Plan:   Follow up plan of care with: ongoing infusions at [facility]. and ordering provider as scheduled.  Discharge instructions were reviewed with patient.  Patient/representative verbalized understanding of discharge instructions and all questions answered.  Patient discharged [facility] in stable condition.    Patricia Qureshi RN    Administrations This Visit       diphenhydrAMINE (BENADRYL) capsule 50 mg       Admin Date  12/18/2024 Action  $Given Dose  50 mg Route  Oral Documented By  Patricia Qureshi RN              HYDROmorphone (DILAUDID) injection 1 mg       Admin Date  12/18/2024 Action  $Given Dose  1 mg Route  Intravenous Documented By  Patricia Qureshi RN              ketorolac (TORADOL) injection 30 mg       Admin Date  12/18/2024 Action  $Given Dose  30 mg Route  Intravenous Documented By  Patricia Qureshi RN              lactated ringers BOLUS 1,000 mL       Admin Date  12/18/2024  Action  $New Bag Dose  1,000 mL Rate  500 mL/hr Route  Intravenous Documented By  Patricia Qureshi RN              ondansetron (ZOFRAN) injection 8 mg       Admin Date  12/18/2024 Action  $Given Dose  8 mg Route  Intravenous Documented By  Patricia Qureshi RN                    /74 (BP Location: Left arm)   Pulse 78   Temp 98.2  F (36.8  C) (Oral)   Resp 16   LMP  (LMP Unknown)   SpO2 93%

## 2024-12-18 NOTE — PROGRESS NOTES
"Clinic Care Coordination Contact  Transitions of Care Outreach  Chief Complaint   Patient presents with    Clinic Care Coordination - Post Hospital       Most Recent Admission Date: 12/13/2024   Most Recent Admission Diagnosis:      Most Recent Discharge Date: 12/13/2024   Most Recent Discharge Diagnosis: Sickle cell pain crisis (H) - D57.00     Transitions of Care Assessment    Discharge Assessment  How are you doing now that you are home?: \"Aida been doing okay\"  How are your symptoms? (Red Flag symptoms escalate to triage hotline per guidelines): Unchanged  Do you know how to contact your clinic care team if you have future questions or changes to your health status? : Yes  Does the patient have their discharge instructions? : Yes  Does the patient have questions regarding their discharge instructions? : No  Were you started on any new medications or were there changes to any of your previous medications? : No  Does the patient have all of their medications?: Yes  Do you have questions regarding any of your medications? : No  Do you have all of your needed medical supplies or equipment (DME)?  (i.e. oxygen tank, CPAP, cane, etc.): Yes     CHW spoke with patient and patient is doing well. Patient declined CC and there are no other needs a this time.     [facility] Explained and offered Care Coordination support to eligible patients: Yes    Patient accepted? No    Follow up Plan     Discharge Follow-Up  Discharge follow up appointment scheduled in alignment with recommended follow up timeframe or Transitions of Risk Category? (Low = within 30 days; Moderate= within 14 days; High= within 7 days): Yes  Discharge Follow Up Appointment Date: 12/19/24  Discharge Follow Up Appointment Scheduled with?: Specialty Care Provider    Future Appointments   Date Time Provider Department Center   12/19/2024  1:30 PM Patricia Mantilla CNP [facility]   1/2/2025  9:30 AM  [facility] DRAW [facility]   1/2/2025 10:00 AM  ONC INFUSION " NURSE [facility]   1/3/2025 10:00 AM Katherine Pierce MD [facility]   1/30/2025  9:30 AM [facility] DRAW [facility]   1/30/2025 10:00 AM [facility] ONC INFUSION NURSE [facility]   2/13/2025  3:00 PM [place], [facility]   2/27/2025 11:00 AM  [facility] DRAW [facility]   2/27/2025 11:30 AM Patricia Mantilla, CNP [facility]   2/27/2025 12:30 PM [facility] ONC INFUSION NURSE [facility]   3/17/2025  1:30 PM  [facility] LAB DRAW [facility]   3/17/2025  2:00 PM Eric Duncan MD [facility]   3/27/2025  3:00 PM Shandra Caruso, PhD [facility]   3/28/2025 11:30 AM  [facility] LAB DRAW [facility]   3/28/2025 12:00 PM [facility] ONC INFUSION NURSE [facility]   4/25/2025 11:30 AM  [facility] LAB DRAW [facility]   4/25/2025 12:00 PM [facility] ONC INFUSION NURSE [facility]   5/23/2025 11:30 AM [facility] DRAW [facility]   5/23/2025 12:00 PM UC ONC INFUSION NURSE [facility]       Outpatient Plan as outlined on AVS reviewed with patient.    For any urgent concerns, please contact our 24 hour nurse triage line: [phone] ([phone])       Becki JOSHUA Community Health Worker  Clinic Care Coordination  [facility] [place] [facility]   Clinic #: [phone]  Direct #: [phone]

## 2024-12-18 NOTE — TELEPHONE ENCOUNTER
Oncology Nurse Triage - Sickle Cell Pain Crisis:    Situation: Jennifer  calling about Sickle Cell Pain Crisis, requesting to be added on for IV fluids and pain medicine    Background:     Patient's last infusion was 12/12/24   Last clinic visit date:11/25/24 Patricia Mantilla   Does patient have active treatment plan? Yes    Assessment of Symptoms:  Onset/Duration of symptoms: 1 day    Is it typical sickle cell pain? Yes  Location: generalized  Character: Dull ache  Intensity: 7/10    Any radiation of pain, numbness, tingling, weakness, warmth, swelling, discoloration of arms or legs?No    Fever? No  (if yes max temperature recorded in last 24 hours):      Chest Pain Present: No    Shortness of breath: No    Other home therapies tried: HEAT/HEATING PAD and WARM BATH     Last home medication taken and when: ibuprofen at 9pm     Any Refills Needed?: No    Does patient have transportation & length of time to get to clinic: Yes 10-15 mins away if can get in early         Recommendations:     Reached out to Patricia Mantilla to see if OK to be put on list   Per Patricia Mantilla Ok to be put on list   Placed on infusion call list     If you do not hear from the infusion center by 2pm then you will not be able to get in for an infusion today. If symptoms worsen while waiting for call back, and/or you experience fever, chills, SOB, chest pain, cough, n/v, dizziness, numbness, swelling, discoloration of extremities, then seek emergency evaluation in Emergency Department.     Please note, if you are late for your appt, you risk losing your infusion appt as it may delay another patient's infusion who arrived on time.       '

## 2024-12-18 NOTE — TELEPHONE ENCOUNTER
[facility] can take at 11am     Call placed to Jennifer she can make it to that appt.     Message sent to [facility] to schedule appointment.

## 2024-12-19 ENCOUNTER — NURSE TRIAGE (OUTPATIENT)
Dept: ONCOLOGY | Facility: CLINIC | Age: 24
End: 2024-12-19
Payer: COMMERCIAL

## 2024-12-19 ENCOUNTER — INFUSION THERAPY VISIT (OUTPATIENT)
Dept: INFUSION THERAPY | Facility: CLINIC | Age: 24
End: 2024-12-19
Attending: PEDIATRICS
Payer: COMMERCIAL

## 2024-12-19 VITALS
TEMPERATURE: 98.1 F | OXYGEN SATURATION: 94 % | SYSTOLIC BLOOD PRESSURE: 135 MMHG | HEART RATE: 109 BPM | DIASTOLIC BLOOD PRESSURE: 73 MMHG | RESPIRATION RATE: 16 BRPM

## 2024-12-19 DIAGNOSIS — G81.10 SPASTIC HEMIPLEGIA, UNSPECIFIED ETIOLOGY, UNSPECIFIED LATERALITY (H): ICD-10-CM

## 2024-12-19 DIAGNOSIS — D57.00 SICKLE CELL PAIN CRISIS (H): Primary | ICD-10-CM

## 2024-12-19 PROCEDURE — 258N000003 HC RX IP 258 OP 636: Performed by: PEDIATRICS

## 2024-12-19 PROCEDURE — 250N000011 HC RX IP 250 OP 636: Performed by: PEDIATRICS

## 2024-12-19 PROCEDURE — 250N000013 HC RX MED GY IP 250 OP 250 PS 637: Performed by: PEDIATRICS

## 2024-12-19 RX ORDER — DIPHENHYDRAMINE HCL 25 MG
50 CAPSULE ORAL
Start: 2025-01-01

## 2024-12-19 RX ORDER — ONDANSETRON 4 MG/1
8 TABLET, FILM COATED ORAL
Start: 2025-01-01

## 2024-12-19 RX ORDER — HEPARIN SODIUM (PORCINE) LOCK FLUSH IV SOLN 100 UNIT/ML 100 UNIT/ML
5 SOLUTION INTRAVENOUS
OUTPATIENT
Start: 2025-01-01

## 2024-12-19 RX ORDER — KETOROLAC TROMETHAMINE 30 MG/ML
30 INJECTION, SOLUTION INTRAMUSCULAR; INTRAVENOUS ONCE
Start: 2025-01-01 | End: 2025-01-01

## 2024-12-19 RX ORDER — DIPHENHYDRAMINE HCL 25 MG
50 CAPSULE ORAL
Status: COMPLETED | OUTPATIENT
Start: 2024-12-19 | End: 2024-12-19

## 2024-12-19 RX ORDER — ONDANSETRON 2 MG/ML
8 INJECTION INTRAMUSCULAR; INTRAVENOUS EVERY 6 HOURS PRN
Status: DISCONTINUED | OUTPATIENT
Start: 2024-12-19 | End: 2024-12-19 | Stop reason: HOSPADM

## 2024-12-19 RX ORDER — HEPARIN SODIUM,PORCINE 10 UNIT/ML
5 VIAL (ML) INTRAVENOUS
OUTPATIENT
Start: 2025-01-01

## 2024-12-19 RX ORDER — KETOROLAC TROMETHAMINE 30 MG/ML
30 INJECTION, SOLUTION INTRAMUSCULAR; INTRAVENOUS ONCE
Status: COMPLETED | OUTPATIENT
Start: 2024-12-19 | End: 2024-12-19

## 2024-12-19 RX ORDER — ONDANSETRON 2 MG/ML
8 INJECTION INTRAMUSCULAR; INTRAVENOUS EVERY 6 HOURS PRN
Start: 2025-01-01

## 2024-12-19 RX ORDER — HEPARIN SODIUM (PORCINE) LOCK FLUSH IV SOLN 100 UNIT/ML 100 UNIT/ML
5 SOLUTION INTRAVENOUS
Status: DISCONTINUED | OUTPATIENT
Start: 2024-12-19 | End: 2024-12-19 | Stop reason: HOSPADM

## 2024-12-19 RX ADMIN — HEPARIN SODIUM (PORCINE) LOCK FLUSH IV SOLN 100 UNIT/ML 5 ML: 100 SOLUTION at 13:51

## 2024-12-19 RX ADMIN — ONDANSETRON 8 MG: 2 INJECTION INTRAMUSCULAR; INTRAVENOUS at 11:26

## 2024-12-19 RX ADMIN — HYDROMORPHONE HYDROCHLORIDE 1 MG: 1 INJECTION, SOLUTION INTRAMUSCULAR; INTRAVENOUS; SUBCUTANEOUS at 11:35

## 2024-12-19 RX ADMIN — SODIUM CHLORIDE, POTASSIUM CHLORIDE, SODIUM LACTATE AND CALCIUM CHLORIDE 1000 ML: 600; 310; 30; 20 INJECTION, SOLUTION INTRAVENOUS at 11:25

## 2024-12-19 RX ADMIN — HYDROMORPHONE HYDROCHLORIDE 1 MG: 1 INJECTION, SOLUTION INTRAMUSCULAR; INTRAVENOUS; SUBCUTANEOUS at 13:32

## 2024-12-19 RX ADMIN — DIPHENHYDRAMINE HYDROCHLORIDE 50 MG: 25 CAPSULE ORAL at 11:24

## 2024-12-19 RX ADMIN — KETOROLAC TROMETHAMINE 30 MG: 30 INJECTION, SOLUTION INTRAMUSCULAR; INTRAVENOUS at 11:26

## 2024-12-19 RX ADMIN — HYDROMORPHONE HYDROCHLORIDE 1 MG: 1 INJECTION, SOLUTION INTRAMUSCULAR; INTRAVENOUS; SUBCUTANEOUS at 12:35

## 2024-12-19 ASSESSMENT — PAIN SCALES - GENERAL: PAINLEVEL_OUTOF10: EXTREME PAIN (9)

## 2024-12-19 NOTE — PATIENT INSTRUCTIONS
Dear Jennifer Cervantes    Thank you for choosing [facility] for your infusion.  The following information is a summary of our appointment as well as important reminders.      If you have any questions on your upcoming Specialty Infusion appointments, please call scheduling at [phone].  It was a pleasure taking care of you today.    Sincerely,    [facility]  [facility]  [address] [place] [address]  [id number]  Phone:  [phone]

## 2024-12-19 NOTE — TELEPHONE ENCOUNTER
"Oncology Nurse Triage - Sickle Cell Pain Crisis:    Situation: Jennifer  calling about Sickle Cell Pain Crisis, requesting to be added on for IV fluids and pain medicine    Background:     Patient's last infusion was 12/18/24  Last clinic visit date:10/31/24  Does patient have active treatment plan? Yes    Assessment of Symptoms:  Onset/Duration of symptoms: 2 day    Is it typical sickle cell pain? Yes  Location: \"all over\"  Character: Sharp  Intensity: 10/10    Any radiation of pain, numbness, tingling, weakness, warmth, swelling, discoloration of arms or legs?No    Fever? No    Chest Pain Present: No    Shortness of breath: No    Other home therapies tried: HEAT/HEATING PAD and WARM BATH     Last home medication taken and when: 10pm last night IBU     Any Refills Needed?: No    Does patient have transportation & length of time to get to clinic: Yes- 20 minutes    Recommendations:   0708 secure chat to Patricia Mantilla for provider approval. Okayed by Patricia. Added to infusion wait list.     If you do not hear from the infusion center by 2pm then you will not be able to get in for an infusion today. If symptoms worsen while waiting for call back, and/or you experience fever, chills, SOB, chest pain, cough, n/v, dizziness, numbness, swelling, discoloration of extremities, then seek emergency evaluation in Emergency Department.     Please note, if you are late for your appt, you risk losing your infusion appt as it may delay another patient's infusion who arrived on time.          "

## 2024-12-19 NOTE — PROGRESS NOTES
Infusion Nursing Note:  Jennifer Cervantes presents today for IV fluids and pain medication.    Patient seen by provider today: Yes (Reta Mantilla CNP)   present during visit today: Not Applicable.    Note:   1L LR infused over 2 hours.   Dilaudid IV given x3   Zofran IV given x1  Toradol IV given x1  Bendryl PO given x1    Intravenous Access:  Implanted Port.    Treatment Conditions:  Not Applicable.    /73 (BP Location: Right arm, Patient Position: Semi-Short's, Cuff Size: Adult Regular)   Pulse 109   Temp 98.1  F (36.7  C) (Oral)   Resp 16   LMP  (LMP Unknown)   SpO2 94%     Administrations This Visit       diphenhydrAMINE (BENADRYL) capsule 50 mg       Admin Date  12/19/2024 Action  $Given Dose  50 mg Route  Oral Documented By  Court Mcelroy RN              heparin lock flush 100 unit/mL injection 5 mL       Admin Date  12/19/2024 Action  $Given Dose  5 mL Route  Intracatheter Documented By  Court Mcelroy RN              HYDROmorphone (DILAUDID) injection 1 mg       Admin Date  12/19/2024 Action  $Given Dose  1 mg Route  Intravenous Documented By  Court Mcelroy RN               Admin Date  12/19/2024 Action  $Given Dose  1 mg Route  Intravenous Documented By  Court Mcelroy RN               Admin Date  12/19/2024 Action  $Given Dose  1 mg Route  Intravenous Documented By  Court Mcelroy RN              ketorolac (TORADOL) injection 30 mg       Admin Date  12/19/2024 Action  $Given Dose  30 mg Route  Intravenous Documented By  Court Mcelroy RN              lactated ringers BOLUS 1,000 mL       Admin Date  12/19/2024 Action  $New Bag Dose  1,000 mL Rate  500 mL/hr Route  Intravenous Documented By  Court Mcelroy RN              ondansetron (ZOFRAN) injection 8 mg       Admin Date  12/19/2024 Action  $Given Dose  8 mg Route  Intravenous Documented By  Court Mcelroy RN                    Post Infusion Assessment:  Patient tolerated infusion without incident.  Site patent and  intact, free from redness, edema or discomfort.  No evidence of extravasations.  Access discontinued per protocol.     Discharge Plan:   AVS to patient via MYCHART.  No future [facility] appointments scheduled at this time.  Patient discharged in stable condition accompanied by: self.  Departure Mode: Ambulatory.    Court Mcelroy RN

## 2024-12-19 NOTE — TELEPHONE ENCOUNTER
1029 Appointment offer with [facility] for IVF/Pain if patient can arrive as soon as able. Jennifer is in agreement and able to leave now and on way.    1031 Scheduling request sent to add onto [facility] schedule.

## 2024-12-20 ENCOUNTER — HOSPITAL ENCOUNTER (EMERGENCY)
Facility: CLINIC | Age: 24
Discharge: HOME | End: 2024-12-20
Attending: STUDENT IN AN ORGANIZED HEALTH CARE EDUCATION/TRAINING PROGRAM | Admitting: STUDENT IN AN ORGANIZED HEALTH CARE EDUCATION/TRAINING PROGRAM
Payer: COMMERCIAL

## 2024-12-20 ENCOUNTER — APPOINTMENT (OUTPATIENT)
Dept: GENERAL RADIOLOGY | Facility: CLINIC | Age: 24
End: 2024-12-20
Attending: STUDENT IN AN ORGANIZED HEALTH CARE EDUCATION/TRAINING PROGRAM
Payer: COMMERCIAL

## 2024-12-20 VITALS
OXYGEN SATURATION: 94 % | WEIGHT: 161.3 LBS | BODY MASS INDEX: 27.54 KG/M2 | SYSTOLIC BLOOD PRESSURE: 118 MMHG | TEMPERATURE: 98 F | HEIGHT: 64 IN | DIASTOLIC BLOOD PRESSURE: 74 MMHG | HEART RATE: 94 BPM | RESPIRATION RATE: 16 BRPM

## 2024-12-20 DIAGNOSIS — D57.00 SICKLE CELL PAIN CRISIS (H): ICD-10-CM

## 2024-12-20 LAB
ALBUMIN SERPL BCG-MCNC: 4.1 G/DL (ref 3.5–5.2)
ALBUMIN UR-MCNC: NEGATIVE MG/DL
ALP SERPL-CCNC: 58 U/L (ref 40–150)
ALT SERPL W P-5'-P-CCNC: 22 U/L (ref 0–50)
ANION GAP SERPL CALCULATED.3IONS-SCNC: 11 MMOL/L (ref 7–15)
APPEARANCE UR: CLEAR
AST SERPL W P-5'-P-CCNC: 36 U/L (ref 0–45)
ATRIAL RATE - MUSE: 94 BPM
BASOPHILS # BLD AUTO: 0.2 10E3/UL (ref 0–0.2)
BASOPHILS NFR BLD AUTO: 2 %
BILIRUB SERPL-MCNC: 2.8 MG/DL
BILIRUB UR QL STRIP: NEGATIVE
BUN SERPL-MCNC: 7.2 MG/DL (ref 6–20)
CALCIUM SERPL-MCNC: 8.6 MG/DL (ref 8.8–10.4)
CHLORIDE SERPL-SCNC: 104 MMOL/L (ref 98–107)
COLOR UR AUTO: ABNORMAL
CREAT SERPL-MCNC: 0.53 MG/DL (ref 0.51–0.95)
DIASTOLIC BLOOD PRESSURE - MUSE: NORMAL MMHG
EGFRCR SERPLBLD CKD-EPI 2021: >90 ML/MIN/1.73M2
EOSINOPHIL # BLD AUTO: 0.3 10E3/UL (ref 0–0.7)
EOSINOPHIL NFR BLD AUTO: 2 %
ERYTHROCYTE [DISTWIDTH] IN BLOOD BY AUTOMATED COUNT: 21.8 % (ref 10–15)
GLUCOSE SERPL-MCNC: 87 MG/DL (ref 70–99)
GLUCOSE UR STRIP-MCNC: NEGATIVE MG/DL
HCG SERPL QL: NEGATIVE
HCO3 SERPL-SCNC: 24 MMOL/L (ref 22–29)
HCT VFR BLD AUTO: 20.3 % (ref 35–47)
HGB BLD-MCNC: 7.2 G/DL (ref 11.7–15.7)
HGB UR QL STRIP: NEGATIVE
IMM GRANULOCYTES # BLD: 0.1 10E3/UL
IMM GRANULOCYTES NFR BLD: 1 %
INR PPP: 1.12 (ref 0.85–1.15)
INTERPRETATION ECG - MUSE: NORMAL
KETONES UR STRIP-MCNC: NEGATIVE MG/DL
LEUKOCYTE ESTERASE UR QL STRIP: NEGATIVE
LYMPHOCYTES # BLD AUTO: 1.7 10E3/UL (ref 0.8–5.3)
LYMPHOCYTES NFR BLD AUTO: 14 %
MCH RBC QN AUTO: 30.8 PG (ref 26.5–33)
MCHC RBC AUTO-ENTMCNC: 35.5 G/DL (ref 31.5–36.5)
MCV RBC AUTO: 87 FL (ref 78–100)
MONOCYTES # BLD AUTO: 1 10E3/UL (ref 0–1.3)
MONOCYTES NFR BLD AUTO: 7 %
NEUTROPHILS # BLD AUTO: 9.5 10E3/UL (ref 1.6–8.3)
NEUTROPHILS NFR BLD AUTO: 74 %
NITRATE UR QL: NEGATIVE
NRBC # BLD AUTO: 0.2 10E3/UL
NRBC BLD AUTO-RTO: 2 /100
P AXIS - MUSE: 70 DEGREES
PH UR STRIP: 7 [PH] (ref 5–7)
PLATELET # BLD AUTO: 498 10E3/UL (ref 150–450)
POTASSIUM SERPL-SCNC: 4.1 MMOL/L (ref 3.4–5.3)
PR INTERVAL - MUSE: 156 MS
PROT SERPL-MCNC: 6.9 G/DL (ref 6.4–8.3)
QRS DURATION - MUSE: 74 MS
QT - MUSE: 370 MS
QTC - MUSE: 462 MS
R AXIS - MUSE: 35 DEGREES
RBC # BLD AUTO: 2.34 10E6/UL (ref 3.8–5.2)
RBC URINE: 1 /HPF
SODIUM SERPL-SCNC: 139 MMOL/L (ref 135–145)
SP GR UR STRIP: 1.01 (ref 1–1.03)
SQUAMOUS EPITHELIAL: 2 /HPF
SYSTOLIC BLOOD PRESSURE - MUSE: NORMAL MMHG
T AXIS - MUSE: 21 DEGREES
TRANSITIONAL EPI: <1 /HPF
TROPONIN T SERPL HS-MCNC: <6 NG/L
UROBILINOGEN UR STRIP-MCNC: 4 MG/DL
VENTRICULAR RATE- MUSE: 94 BPM
WBC # BLD AUTO: 12.8 10E3/UL (ref 4–11)
WBC URINE: 2 /HPF

## 2024-12-20 PROCEDURE — 85610 PROTHROMBIN TIME: CPT | Performed by: STUDENT IN AN ORGANIZED HEALTH CARE EDUCATION/TRAINING PROGRAM

## 2024-12-20 PROCEDURE — 85004 AUTOMATED DIFF WBC COUNT: CPT | Performed by: STUDENT IN AN ORGANIZED HEALTH CARE EDUCATION/TRAINING PROGRAM

## 2024-12-20 PROCEDURE — 93005 ELECTROCARDIOGRAM TRACING: CPT | Performed by: STUDENT IN AN ORGANIZED HEALTH CARE EDUCATION/TRAINING PROGRAM

## 2024-12-20 PROCEDURE — 81003 URINALYSIS AUTO W/O SCOPE: CPT | Performed by: STUDENT IN AN ORGANIZED HEALTH CARE EDUCATION/TRAINING PROGRAM

## 2024-12-20 PROCEDURE — 250N000013 HC RX MED GY IP 250 OP 250 PS 637: Performed by: STUDENT IN AN ORGANIZED HEALTH CARE EDUCATION/TRAINING PROGRAM

## 2024-12-20 PROCEDURE — 85041 AUTOMATED RBC COUNT: CPT | Performed by: STUDENT IN AN ORGANIZED HEALTH CARE EDUCATION/TRAINING PROGRAM

## 2024-12-20 PROCEDURE — 96375 TX/PRO/DX INJ NEW DRUG ADDON: CPT | Performed by: STUDENT IN AN ORGANIZED HEALTH CARE EDUCATION/TRAINING PROGRAM

## 2024-12-20 PROCEDURE — 99285 EMERGENCY DEPT VISIT HI MDM: CPT | Mod: 25 | Performed by: STUDENT IN AN ORGANIZED HEALTH CARE EDUCATION/TRAINING PROGRAM

## 2024-12-20 PROCEDURE — 96376 TX/PRO/DX INJ SAME DRUG ADON: CPT | Performed by: STUDENT IN AN ORGANIZED HEALTH CARE EDUCATION/TRAINING PROGRAM

## 2024-12-20 PROCEDURE — 96374 THER/PROPH/DIAG INJ IV PUSH: CPT | Performed by: STUDENT IN AN ORGANIZED HEALTH CARE EDUCATION/TRAINING PROGRAM

## 2024-12-20 PROCEDURE — 84155 ASSAY OF PROTEIN SERUM: CPT | Performed by: STUDENT IN AN ORGANIZED HEALTH CARE EDUCATION/TRAINING PROGRAM

## 2024-12-20 PROCEDURE — 93010 ELECTROCARDIOGRAM REPORT: CPT | Performed by: STUDENT IN AN ORGANIZED HEALTH CARE EDUCATION/TRAINING PROGRAM

## 2024-12-20 PROCEDURE — 258N000003 HC RX IP 258 OP 636: Performed by: STUDENT IN AN ORGANIZED HEALTH CARE EDUCATION/TRAINING PROGRAM

## 2024-12-20 PROCEDURE — 250N000011 HC RX IP 250 OP 636: Performed by: STUDENT IN AN ORGANIZED HEALTH CARE EDUCATION/TRAINING PROGRAM

## 2024-12-20 PROCEDURE — 99285 EMERGENCY DEPT VISIT HI MDM: CPT | Performed by: STUDENT IN AN ORGANIZED HEALTH CARE EDUCATION/TRAINING PROGRAM

## 2024-12-20 PROCEDURE — 84703 CHORIONIC GONADOTROPIN ASSAY: CPT | Performed by: STUDENT IN AN ORGANIZED HEALTH CARE EDUCATION/TRAINING PROGRAM

## 2024-12-20 PROCEDURE — 71046 X-RAY EXAM CHEST 2 VIEWS: CPT

## 2024-12-20 PROCEDURE — 84484 ASSAY OF TROPONIN QUANT: CPT | Performed by: STUDENT IN AN ORGANIZED HEALTH CARE EDUCATION/TRAINING PROGRAM

## 2024-12-20 PROCEDURE — 82374 ASSAY BLOOD CARBON DIOXIDE: CPT | Performed by: STUDENT IN AN ORGANIZED HEALTH CARE EDUCATION/TRAINING PROGRAM

## 2024-12-20 PROCEDURE — 96361 HYDRATE IV INFUSION ADD-ON: CPT | Performed by: STUDENT IN AN ORGANIZED HEALTH CARE EDUCATION/TRAINING PROGRAM

## 2024-12-20 PROCEDURE — 36415 COLL VENOUS BLD VENIPUNCTURE: CPT | Performed by: STUDENT IN AN ORGANIZED HEALTH CARE EDUCATION/TRAINING PROGRAM

## 2024-12-20 PROCEDURE — 84520 ASSAY OF UREA NITROGEN: CPT | Performed by: STUDENT IN AN ORGANIZED HEALTH CARE EDUCATION/TRAINING PROGRAM

## 2024-12-20 RX ORDER — SODIUM CHLORIDE, SODIUM LACTATE, POTASSIUM CHLORIDE, CALCIUM CHLORIDE 600; 310; 30; 20 MG/100ML; MG/100ML; MG/100ML; MG/100ML
INJECTION, SOLUTION INTRAVENOUS CONTINUOUS
Status: DISCONTINUED | OUTPATIENT
Start: 2024-12-20 | End: 2024-12-20 | Stop reason: HOSPADM

## 2024-12-20 RX ORDER — ACETAMINOPHEN 500 MG
1000 TABLET ORAL ONCE
Status: COMPLETED | OUTPATIENT
Start: 2024-12-20 | End: 2024-12-20

## 2024-12-20 RX ORDER — KETOROLAC TROMETHAMINE 30 MG/ML
30 INJECTION, SOLUTION INTRAMUSCULAR; INTRAVENOUS ONCE
Status: COMPLETED | OUTPATIENT
Start: 2024-12-20 | End: 2024-12-20

## 2024-12-20 RX ORDER — ONDANSETRON 2 MG/ML
8 INJECTION INTRAMUSCULAR; INTRAVENOUS EVERY 6 HOURS PRN
Status: DISCONTINUED | OUTPATIENT
Start: 2024-12-20 | End: 2024-12-20

## 2024-12-20 RX ORDER — HEPARIN SODIUM (PORCINE) LOCK FLUSH IV SOLN 100 UNIT/ML 100 UNIT/ML
5 SOLUTION INTRAVENOUS ONCE
Status: COMPLETED | OUTPATIENT
Start: 2024-12-20 | End: 2024-12-20

## 2024-12-20 RX ORDER — ONDANSETRON 2 MG/ML
8 INJECTION INTRAMUSCULAR; INTRAVENOUS EVERY 8 HOURS PRN
Status: DISCONTINUED | OUTPATIENT
Start: 2024-12-20 | End: 2024-12-20 | Stop reason: HOSPADM

## 2024-12-20 RX ADMIN — SODIUM CHLORIDE, POTASSIUM CHLORIDE, SODIUM LACTATE AND CALCIUM CHLORIDE: 600; 310; 30; 20 INJECTION, SOLUTION INTRAVENOUS at 15:12

## 2024-12-20 RX ADMIN — SODIUM CHLORIDE, POTASSIUM CHLORIDE, SODIUM LACTATE AND CALCIUM CHLORIDE 1000 ML: 600; 310; 30; 20 INJECTION, SOLUTION INTRAVENOUS at 13:40

## 2024-12-20 RX ADMIN — ACETAMINOPHEN 1000 MG: 500 TABLET ORAL at 13:41

## 2024-12-20 RX ADMIN — ONDANSETRON 8 MG: 2 INJECTION INTRAMUSCULAR; INTRAVENOUS at 16:58

## 2024-12-20 RX ADMIN — HYDROMORPHONE HYDROCHLORIDE 1 MG: 1 INJECTION, SOLUTION INTRAMUSCULAR; INTRAVENOUS; SUBCUTANEOUS at 13:41

## 2024-12-20 RX ADMIN — HYDROMORPHONE HYDROCHLORIDE 1 MG: 1 INJECTION, SOLUTION INTRAMUSCULAR; INTRAVENOUS; SUBCUTANEOUS at 15:12

## 2024-12-20 RX ADMIN — HEPARIN 5 ML: 100 SYRINGE at 18:26

## 2024-12-20 RX ADMIN — HYDROMORPHONE HYDROCHLORIDE 1 MG: 1 INJECTION, SOLUTION INTRAMUSCULAR; INTRAVENOUS; SUBCUTANEOUS at 16:58

## 2024-12-20 RX ADMIN — KETOROLAC TROMETHAMINE 30 MG: 30 INJECTION, SOLUTION INTRAMUSCULAR at 13:41

## 2024-12-20 ASSESSMENT — ACTIVITIES OF DAILY LIVING (ADL)
ADLS_ACUITY_SCORE: 58

## 2024-12-20 NOTE — ED TRIAGE NOTES
Triage Assessment (Adult)       Row Name 12/20/24 1249          Triage Assessment    Airway WDL WDL        Respiratory WDL    Respiratory WDL WDL        Skin Circulation/Temperature WDL    Skin Circulation/Temperature WDL WDL        Cardiac WDL    Cardiac WDL WDL        Peripheral/Neurovascular WDL    Peripheral Neurovascular WDL WDL        Cognitive/Neuro/Behavioral WDL    Cognitive/Neuro/Behavioral WDL WDL

## 2024-12-20 NOTE — ED PROVIDER NOTES
[facility] EMERGENCY DEPARTMENT ([facility])    12/20/24      ED PROVIDER NOTE       History     Chief Complaint   Patient presents with    Sickle Cell Pain Crisis     Sickle cell pain started today     HPI  Jennifer Cervantes is a 24 year old female with a history of sickle cell disease who presents to the ED with a sickle cell pain crisis. Patient with pain worsening today, not responding to home PO oxycodone. Recently admitted with pain crisis and some anemia. Was having some pelvic bleeding at the time which she reports has completely resolved. No chest pain, dyspnea, or fevers today. Describes pain as similar to prior pain crises.     Patient states she does not want to overuse very limited home oxycodone script.    Past Medical History  Past Medical History:   Diagnosis Date    Anxiety     Bleeding disorder (H)     Blood clotting disorder (H)     Cerebral infarction (H) 2015    Cognitive developmental delay     low IQ. 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 side (H)     right hand contractures    Iron overload due to repeated red blood cell transfusions     Migraines     Multiple subsegmental pulmonary emboli without acute cor pulmonale (H) 02/01/2021    Oppositional defiant behavior     Presence of intrauterine contraceptive device 2/18/2020    Superficial venous thrombosis of arm, right 03/25/2021    Uncomplicated asthma      Past Surgical History:   Procedure Laterality Date    AS INSERT TUNNELED CV 2 CATH W/O PORT/PUMP      CHOLECYSTECTOMY      CV RIGHT HEART CATH MEASUREMENTS RECORDED N/A 11/18/2021    Procedure: Right Heart Cath;  Surgeon: Jackson Stauffer MD;  Location:  HEART CARDIAC CATH LAB    INSERT PORT VASCULAR ACCESS Left 4/21/2021    Procedure: INSERTION, VASCULAR ACCESS PORT (NOT SURE ON SIDE UNTIL REMOVAL);  Surgeon: Rajan More MD;  Location: [facility]      CHEST PORT PLACEMENT > 5 YRS OF AGE  4/21/2021    IR CVC NON TUNNEL LINE REMOVAL  5/6/2021    IR CVC NON TUNNEL PLACEMENT > 5 YRS  04/07/2020    IR CVC NON TUNNEL PLACEMENT > 5 YRS  4/30/2021    IR CVC NON TUNNEL PLACEMENT > 5 YRS  9/7/2022    IR PORT REMOVAL LEFT  4/21/2021    REMOVE PORT VASCULAR ACCESS Left 4/21/2021    Procedure: REMOVAL, VASCULAR ACCESS PORT LEFT;  Surgeon: Rajan More MD;  Location: [facility] OR    REPAIR TENDON ELBOW Right 10/02/2019    Procedure: Right Elbow Flexor Lengthening, Flexor Pronator Slide Of Wrist and Finger, Thumb Adductor Lengthening;  Surgeon: Anai Franco MD;  Location: [facility] OR    TONSILLECTOMY Bilateral 10/02/2019    Procedure: Bilateral Tonsillectomy;  Surgeon: Farhana Guy MD;  Location: [facility] OR    ZZC BREAST REDUCTION (INCLUDES LIPO) TIER 3 Bilateral 04/23/2019     albuterol (PROVENTIL) (2.5 MG/3ML) 0.083% neb solution  aspirin (ASA) 81 MG chewable tablet  budesonide-formoterol (SYMBICORT) 160-4.5 MCG/ACT Inhaler  deferasirox (JADENU) 360 MG tablet  Deferiprone, Twice Daily, 1000 MG TABS  diphenhydrAMINE (BENADRYL) 50 MG capsule  gabapentin (NEURONTIN) 300 MG capsule  hydroxyurea (HYDREA) 500 MG capsule  ibuprofen (ADVIL/MOTRIN) 800 MG tablet  methocarbamol (ROBAXIN) 750 MG tablet  oxyCODONE IR (ROXICODONE) 10 MG tablet  EPINEPHrine (ANY BX GENERIC EQUIV) 0.3 MG/0.3ML injection 2-pack  methylPREDNISolone (MEDROL) 32 MG tablet  naloxone (NARCAN) 4 MG/0.1ML nasal spray      Allergies   Allergen Reactions    Contrast Dye Angioedema     Hives and breathing issues    Fish-Derived Products Hives    Seafood Hives    Adhesive Tape Hives     Primipore dressing causes hives    Gadolinium     Iodinated Contrast Media      Family History  Family History   Problem Relation Age of Onset    Sickle Cell Trait Mother     Hypertension Mother     Asthma Mother     Sickle Cell Trait Father     Glaucoma No family hx of     Macular Degeneration No family hx of     Diabetes No  "family hx of     Gout No family hx of      Social History   Social History     Tobacco Use    Smoking status: Never     Passive exposure: Never    Smokeless tobacco: Never   Substance Use Topics    Alcohol use: Not Currently     Alcohol/week: 0.0 standard drinks of alcohol    Drug use: Never      Past medical history, past surgical history, medications, allergies, family history, and social history were reviewed with the patient. No additional pertinent items.   A medically appropriate review of systems was performed with pertinent positives and negatives noted in the HPI, and all other systems negative.    Physical Exam   BP: 118/74  Pulse: 94  Temp: 98  F (36.7  C)  Resp: 16  Height: 162.6 cm (5' 4\")  Weight: 73.2 kg (161 lb 4.8 oz)  SpO2: 94 %  Physical Exam  Vital Signs Reviewed  Gen: Well nourished, well developed, resting comfortably, no acute distress  HEENT: NC/AT, PERRL, EOMI, MMM  Neck: Supple, FROM  CV: Regular Rate  Lungs/Chest: Normal Effort  Abd: Non-distended  MSK/Back: FROM, no visible deformity  Neuro: A&Ox3, GCS 15, CN II-XII unremarkable. Strength and sensation globally intact.  Skin: Warm, Dry, Intact, no visible lesions     ED Course, Procedures, & Data      Procedures            EKG Interpretation:      Interpreted by Jesus Rhodes MD  Time reviewed: 1310  Symptoms at time of EKG: Screening   Rhythm: normal sinus   Rate: Normal  Axis: Normal  Ectopy: none  Conduction: normal  ST Segments/ T Waves: No ST-T wave changes and No acute ischemic changes  Q Waves: none  Comparison to prior: Unchanged    Clinical Impression: normal EKG                 Results for orders placed or performed during the hospital encounter of 12/20/24   XR Chest 2 Views     Status: None    Narrative    CHEST TWO VIEWS  12/20/2024 3:05 PM     HISTORY:  Sickle cell crisis.    COMPARISON: 11/13/2024.      Impression    IMPRESSION: Left Port-A-Cath, tip near the SVC/RA junction. Lungs  clear. No pleural effusions. Heart " size and pulmonary vascularity are  within normal limits. Cholecystectomy.    JIM CASTILLO MD         SYSTEM ID:  [id number]   INR     Status: Normal   Result Value Ref Range    INR 1.12 0.85 - 1.15   Comprehensive metabolic panel     Status: Abnormal   Result Value Ref Range    Sodium 139 135 - 145 mmol/L    Potassium 4.1 3.4 - 5.3 mmol/L    Carbon Dioxide (CO2) 24 22 - 29 mmol/L    Anion Gap 11 7 - 15 mmol/L    Urea Nitrogen 7.2 6.0 - 20.0 mg/dL    Creatinine 0.53 0.51 - 0.95 mg/dL    GFR Estimate >90 >60 mL/min/1.73m2    Calcium 8.6 (L) 8.8 - 10.4 mg/dL    Chloride 104 98 - 107 mmol/L    Glucose 87 70 - 99 mg/dL    Alkaline Phosphatase 58 40 - 150 U/L    AST 36 0 - 45 U/L    ALT 22 0 - 50 U/L    Protein Total 6.9 6.4 - 8.3 g/dL    Albumin 4.1 3.5 - 5.2 g/dL    Bilirubin Total 2.8 (H) <=1.2 mg/dL   Troponin T, High Sensitivity     Status: Normal   Result Value Ref Range    Troponin T, High Sensitivity <6 <=14 ng/L   HCG qualitative Blood     Status: Normal   Result Value Ref Range    hCG Serum Qualitative Negative Negative   UA with Microscopic reflex to Culture     Status: Abnormal    Specimen: Urine, Clean Catch   Result Value Ref Range    Color Urine Light Yellow Colorless, Straw, Light Yellow, Yellow    Appearance Urine Clear Clear    Glucose Urine Negative Negative mg/dL    Bilirubin Urine Negative Negative    Ketones Urine Negative Negative mg/dL    Specific Gravity Urine 1.009 1.003 - 1.035    Blood Urine Negative Negative    pH Urine 7.0 5.0 - 7.0    Protein Albumin Urine Negative Negative mg/dL    Urobilinogen Urine 4.0 (A) Normal, 2.0 mg/dL    Nitrite Urine Negative Negative    Leukocyte Esterase Urine Negative Negative    RBC Urine 1 <=2 /HPF    WBC Urine 2 <=5 /HPF    Squamous Epithelials Urine 2 (H) <=1 /HPF    Transitional Epithelials Urine <1 <=1 /HPF    Narrative    Urine Culture not indicated   CBC with platelets and differential     Status: Abnormal   Result Value Ref Range    WBC Count 12.8  (H) 4.0 - 11.0 10e3/uL    RBC Count 2.34 (L) 3.80 - 5.20 10e6/uL    Hemoglobin 7.2 (L) 11.7 - 15.7 g/dL    Hematocrit 20.3 (L) 35.0 - 47.0 %    MCV 87 78 - 100 fL    MCH 30.8 26.5 - 33.0 pg    MCHC 35.5 31.5 - 36.5 g/dL    RDW 21.8 (H) 10.0 - 15.0 %    Platelet Count 498 (H) 150 - 450 10e3/uL    % Neutrophils 74 %    % Lymphocytes 14 %    % Monocytes 7 %    % Eosinophils 2 %    % Basophils 2 %    % Immature Granulocytes 1 %    NRBCs per 100 WBC 2 (H) <1 /100    Absolute Neutrophils 9.5 (H) 1.6 - 8.3 10e3/uL    Absolute Lymphocytes 1.7 0.8 - 5.3 10e3/uL    Absolute Monocytes 1.0 0.0 - 1.3 10e3/uL    Absolute Eosinophils 0.3 0.0 - 0.7 10e3/uL    Absolute Basophils 0.2 0.0 - 0.2 10e3/uL    Absolute Immature Granulocytes 0.1 <=0.4 10e3/uL    Absolute NRBCs 0.2 10e3/uL   EKG 12-lead, tracing only     Status: None   Result Value Ref Range    Systolic Blood Pressure  mmHg    Diastolic Blood Pressure  mmHg    Ventricular Rate 94 BPM    Atrial Rate 94 BPM    CO Interval 156 ms    QRS Duration 74 ms     ms    QTc 462 ms    P Axis 70 degrees    R AXIS 35 degrees    T Axis 21 degrees    Interpretation ECG       Sinus rhythm  Normal ECG  Unconfirmed report - interpretation of this ECG is computer generated - see medical record for final interpretation  Confirmed by - EMERGENCY ROOM, PHYSICIAN ([id number]),  RACHEL CARRASQUILLO ([id number]) on 12/20/2024 7:20:39 PM     CBC with platelets differential     Status: Abnormal    Narrative    The following orders were created for panel order CBC with platelets differential.  Procedure                               Abnormality         Status                     ---------                               -----------         ------                     CBC with platelets and d...[[id number]]  Abnormal            Final result                 Please view results for these tests on the individual orders.     Medications   HYDROmorphone (DILAUDID) injection 1 mg (1 mg Intravenous $Given 12/20/24  8608)   ketorolac (TORADOL) injection 30 mg (30 mg Intravenous $Given 12/20/24 1341)   acetaminophen (TYLENOL) tablet 1,000 mg (1,000 mg Oral $Given 12/20/24 1341)   lactated ringers BOLUS 1,000 mL (0 mLs Intravenous Stopped 12/20/24 1510)   heparin lock flush 100 unit/mL injection 5 mL (5 mLs Intravenous $Given 12/20/24 1826)     Labs Ordered and Resulted from Time of ED Arrival to Time of ED Departure   COMPREHENSIVE METABOLIC PANEL - Abnormal       Result Value    Sodium 139      Potassium 4.1      Carbon Dioxide (CO2) 24      Anion Gap 11      Urea Nitrogen 7.2      Creatinine 0.53      GFR Estimate >90      Calcium 8.6 (*)     Chloride 104      Glucose 87      Alkaline Phosphatase 58      AST 36      ALT 22      Protein Total 6.9      Albumin 4.1      Bilirubin Total 2.8 (*)    ROUTINE UA WITH MICROSCOPIC REFLEX TO CULTURE - Abnormal    Color Urine Light Yellow      Appearance Urine Clear      Glucose Urine Negative      Bilirubin Urine Negative      Ketones Urine Negative      Specific Gravity Urine 1.009      Blood Urine Negative      pH Urine 7.0      Protein Albumin Urine Negative      Urobilinogen Urine 4.0 (*)     Nitrite Urine Negative      Leukocyte Esterase Urine Negative      RBC Urine 1      WBC Urine 2      Squamous Epithelials Urine 2 (*)     Transitional Epithelials Urine <1     CBC WITH PLATELETS AND DIFFERENTIAL - Abnormal    WBC Count 12.8 (*)     RBC Count 2.34 (*)     Hemoglobin 7.2 (*)     Hematocrit 20.3 (*)     MCV 87      MCH 30.8      MCHC 35.5      RDW 21.8 (*)     Platelet Count 498 (*)     % Neutrophils 74      % Lymphocytes 14      % Monocytes 7      % Eosinophils 2      % Basophils 2      % Immature Granulocytes 1      NRBCs per 100 WBC 2 (*)     Absolute Neutrophils 9.5 (*)     Absolute Lymphocytes 1.7      Absolute Monocytes 1.0      Absolute Eosinophils 0.3      Absolute Basophils 0.2      Absolute Immature Granulocytes 0.1      Absolute NRBCs 0.2     INR - Normal    INR 1.12      TROPONIN T, HIGH SENSITIVITY - Normal    Troponin T, High Sensitivity <6     HCG QUALITATIVE PREGNANCY - Normal    hCG Serum Qualitative Negative       XR Chest 2 Views   Final Result   IMPRESSION: Left Port-A-Cath, tip near the SVC/RA junction. Lungs   clear. No pleural effusions. Heart size and pulmonary vascularity are   within normal limits. Cholecystectomy.      JIM CASTILLO MD            SYSTEM ID:  [id number]             Critical care was not performed.     Medical Decision Making  The patient's presentation was of high complexity (a chronic illness severe exacerbation, progression, or side effect of treatment).    The patient's evaluation involved:  ordering and/or review of 3+ test(s) in this encounter (see separate area of note for details)    The patient's management necessitated high risk (a parenteral controlled substance).    Assessment & Plan    Jennifer Cervantes is a 24 year old female with a history of sickle cell disease who presents to the ED with a sickle cell pain crisis.  On arrival patient resting comfortably with reassuring vital signs.  Pain is typical to prior crises.  No signs or symptoms of acute chest syndrome.  Patient previously been having some dysfunctional uterine bleeding which appears to have stopped.    Patient's care plan was reviewed and initiated.  Her pain is quite well-controlled after this.  She states that she has not had the degree of pain relief she has had today on prior visits.    Labs today are reassuring.  Nonspecific leukocytosis which is downtrending with no obvious infection identified on workup today.  Hemoglobin is in the low sevens which is pretty stable for this patient.    Given her reassuring evaluation and significant clinical improvement patient would like to discharge home and continue outpatient management with close hematology follow-up which seems reasonable.  Patient is aware of return precautions and has no further questions comments or  concerns.      I have reviewed the nursing notes. I have reviewed the findings, diagnosis, plan and need for follow up with the patient.    Discharge Medication List as of 12/20/2024  6:26 PM          Final diagnoses:   Sickle cell pain crisis (H)       Jesus Rhodes Jr., MD   [facility] EMERGENCY DEPARTMENT  12/20/2024     Jesus Rhodes MD  12/20/24 [id number]

## 2024-12-21 NOTE — DISCHARGE INSTRUCTIONS
Thank you for coming to the [facility] emergency department.  We are very glad to hear your pain is improved compared to arrival and that you are doing much better compared to prior visits with your pain improvement.    We would like you to continue to follow-up closely with your outpatient hematology sickle cell care team.    You develop any acute concerns before you can follow-up with your outpatient care teams feel free to return to the emergency department at any time.

## 2024-12-23 ENCOUNTER — HOSPITAL ENCOUNTER (EMERGENCY)
Facility: CLINIC | Age: 24
Discharge: HOME | End: 2024-12-24
Attending: EMERGENCY MEDICINE | Admitting: EMERGENCY MEDICINE
Payer: COMMERCIAL

## 2024-12-23 ENCOUNTER — NURSE TRIAGE (OUTPATIENT)
Dept: ONCOLOGY | Facility: CLINIC | Age: 24
End: 2024-12-23
Payer: COMMERCIAL

## 2024-12-23 VITALS
HEART RATE: 91 BPM | TEMPERATURE: 97.9 F | SYSTOLIC BLOOD PRESSURE: 123 MMHG | OXYGEN SATURATION: 98 % | DIASTOLIC BLOOD PRESSURE: 70 MMHG | RESPIRATION RATE: 16 BRPM

## 2024-12-23 DIAGNOSIS — D57.00 SICKLE CELL DISEASE WITH CRISIS (H): ICD-10-CM

## 2024-12-23 LAB
ALBUMIN SERPL BCG-MCNC: 4.7 G/DL (ref 3.5–5.2)
ALP SERPL-CCNC: 65 U/L (ref 40–150)
ALT SERPL W P-5'-P-CCNC: 23 U/L (ref 0–50)
ANION GAP SERPL CALCULATED.3IONS-SCNC: 11 MMOL/L (ref 7–15)
AST SERPL W P-5'-P-CCNC: 32 U/L (ref 0–45)
BASOPHILS # BLD AUTO: 0.2 10E3/UL (ref 0–0.2)
BASOPHILS NFR BLD AUTO: 2 %
BILIRUB SERPL-MCNC: 2.4 MG/DL
BUN SERPL-MCNC: 8.3 MG/DL (ref 6–20)
CALCIUM SERPL-MCNC: 9.2 MG/DL (ref 8.8–10.4)
CHLORIDE SERPL-SCNC: 106 MMOL/L (ref 98–107)
CREAT SERPL-MCNC: 0.51 MG/DL (ref 0.51–0.95)
EGFRCR SERPLBLD CKD-EPI 2021: >90 ML/MIN/1.73M2
EOSINOPHIL # BLD AUTO: 0.3 10E3/UL (ref 0–0.7)
EOSINOPHIL NFR BLD AUTO: 2 %
ERYTHROCYTE [DISTWIDTH] IN BLOOD BY AUTOMATED COUNT: 22.4 % (ref 10–15)
GLUCOSE SERPL-MCNC: 93 MG/DL (ref 70–99)
HCG SERPL QL: NEGATIVE
HCO3 SERPL-SCNC: 23 MMOL/L (ref 22–29)
HCT VFR BLD AUTO: 22.1 % (ref 35–47)
HGB BLD-MCNC: 7.7 G/DL (ref 11.7–15.7)
IMM GRANULOCYTES # BLD: 0.1 10E3/UL
IMM GRANULOCYTES NFR BLD: 1 %
LYMPHOCYTES # BLD AUTO: 3 10E3/UL (ref 0.8–5.3)
LYMPHOCYTES NFR BLD AUTO: 22 %
MCH RBC QN AUTO: 31.3 PG (ref 26.5–33)
MCHC RBC AUTO-ENTMCNC: 34.8 G/DL (ref 31.5–36.5)
MCV RBC AUTO: 90 FL (ref 78–100)
MONOCYTES # BLD AUTO: 1.2 10E3/UL (ref 0–1.3)
MONOCYTES NFR BLD AUTO: 9 %
NEUTROPHILS # BLD AUTO: 8.8 10E3/UL (ref 1.6–8.3)
NEUTROPHILS NFR BLD AUTO: 65 %
NRBC # BLD AUTO: 0.3 10E3/UL
NRBC BLD AUTO-RTO: 2 /100
PLATELET # BLD AUTO: 448 10E3/UL (ref 150–450)
POTASSIUM SERPL-SCNC: 4 MMOL/L (ref 3.4–5.3)
PROT SERPL-MCNC: 8 G/DL (ref 6.4–8.3)
RBC # BLD AUTO: 2.46 10E6/UL (ref 3.8–5.2)
RETICS # AUTO: 0.51 10E6/UL (ref 0.03–0.1)
RETICS/RBC NFR AUTO: 20.3 % (ref 0.5–2)
SODIUM SERPL-SCNC: 140 MMOL/L (ref 135–145)
WBC # BLD AUTO: 13.6 10E3/UL (ref 4–11)

## 2024-12-23 PROCEDURE — 99284 EMERGENCY DEPT VISIT MOD MDM: CPT | Mod: 25 | Performed by: EMERGENCY MEDICINE

## 2024-12-23 PROCEDURE — 84703 CHORIONIC GONADOTROPIN ASSAY: CPT | Performed by: EMERGENCY MEDICINE

## 2024-12-23 PROCEDURE — 96376 TX/PRO/DX INJ SAME DRUG ADON: CPT | Performed by: EMERGENCY MEDICINE

## 2024-12-23 PROCEDURE — 99285 EMERGENCY DEPT VISIT HI MDM: CPT | Performed by: EMERGENCY MEDICINE

## 2024-12-23 PROCEDURE — 96374 THER/PROPH/DIAG INJ IV PUSH: CPT | Performed by: EMERGENCY MEDICINE

## 2024-12-23 PROCEDURE — 36415 COLL VENOUS BLD VENIPUNCTURE: CPT | Performed by: EMERGENCY MEDICINE

## 2024-12-23 PROCEDURE — 96375 TX/PRO/DX INJ NEW DRUG ADDON: CPT | Performed by: EMERGENCY MEDICINE

## 2024-12-23 PROCEDURE — 250N000011 HC RX IP 250 OP 636: Performed by: EMERGENCY MEDICINE

## 2024-12-23 PROCEDURE — 85004 AUTOMATED DIFF WBC COUNT: CPT | Performed by: EMERGENCY MEDICINE

## 2024-12-23 PROCEDURE — 96361 HYDRATE IV INFUSION ADD-ON: CPT | Performed by: EMERGENCY MEDICINE

## 2024-12-23 PROCEDURE — 85041 AUTOMATED RBC COUNT: CPT | Performed by: EMERGENCY MEDICINE

## 2024-12-23 PROCEDURE — 258N000003 HC RX IP 258 OP 636: Performed by: EMERGENCY MEDICINE

## 2024-12-23 PROCEDURE — 85045 AUTOMATED RETICULOCYTE COUNT: CPT | Performed by: EMERGENCY MEDICINE

## 2024-12-23 PROCEDURE — 80053 COMPREHEN METABOLIC PANEL: CPT | Performed by: EMERGENCY MEDICINE

## 2024-12-23 RX ORDER — KETOROLAC TROMETHAMINE 30 MG/ML
30 INJECTION, SOLUTION INTRAMUSCULAR; INTRAVENOUS ONCE
Status: COMPLETED | OUTPATIENT
Start: 2024-12-23 | End: 2024-12-23

## 2024-12-23 RX ORDER — ONDANSETRON 2 MG/ML
4 INJECTION INTRAMUSCULAR; INTRAVENOUS ONCE
Status: COMPLETED | OUTPATIENT
Start: 2024-12-23 | End: 2024-12-23

## 2024-12-23 RX ADMIN — HYDROMORPHONE HYDROCHLORIDE 1 MG: 1 INJECTION, SOLUTION INTRAMUSCULAR; INTRAVENOUS; SUBCUTANEOUS at 23:29

## 2024-12-23 RX ADMIN — KETOROLAC TROMETHAMINE 30 MG: 30 INJECTION, SOLUTION INTRAMUSCULAR at 21:26

## 2024-12-23 RX ADMIN — SODIUM CHLORIDE, POTASSIUM CHLORIDE, SODIUM LACTATE AND CALCIUM CHLORIDE 1000 ML: 600; 310; 30; 20 INJECTION, SOLUTION INTRAVENOUS at 21:26

## 2024-12-23 RX ADMIN — ONDANSETRON 4 MG: 2 INJECTION INTRAMUSCULAR; INTRAVENOUS at 22:34

## 2024-12-23 RX ADMIN — HYDROMORPHONE HYDROCHLORIDE 1 MG: 1 INJECTION, SOLUTION INTRAMUSCULAR; INTRAVENOUS; SUBCUTANEOUS at 22:29

## 2024-12-23 RX ADMIN — HYDROMORPHONE HYDROCHLORIDE 1 MG: 1 INJECTION, SOLUTION INTRAMUSCULAR; INTRAVENOUS; SUBCUTANEOUS at 21:26

## 2024-12-23 ASSESSMENT — ACTIVITIES OF DAILY LIVING (ADL)
ADLS_ACUITY_SCORE: 58

## 2024-12-23 NOTE — TELEPHONE ENCOUNTER
Oncology Nurse Triage - Sickle Cell Pain Crisis:  Situation: Jennifer  calling about Sickle Cell Pain Crisis, requesting to be added on for IV fluids and pain medicine    Background:   Patient's last infusion was ED on 12/20  Last clinic visit date:12/19/24 with Patricia Mantilla CNP  Does patient have active treatment plan? Yes    Assessment of Symptoms:  Onset/Duration of symptoms: 2 day    Is it typical sickle cell pain? Yes  Location: all on R side of right leg  Character: Sharp  Intensity: 9/10    Any radiation of pain, numbness, tingling, weakness, warmth, swelling, discoloration of arms or legs?No    Fever? No  Chest Pain Present: No  Shortness of breath: No    Other home therapies tried: HEAT/HEATING PAD and WARM BATH   Last home medication taken and when: oxycodone at 0600 and ibuprofen    Any Refills Needed?: No    Does patient have transportation & length of time to get to clinic: Yes  10 minutes away    Recommendations:   If you do not hear from the infusion center by 2pm then you will not be able to get in for an infusion today. If symptoms worsen while waiting for call back, and/or you experience fever, chills, SOB, chest pain, cough, n/v, dizziness, numbness, swelling, discoloration of extremities, then seek emergency evaluation in Emergency Department.     Pt voiced understanding.  Added to Infusion wait list.

## 2024-12-23 NOTE — TELEPHONE ENCOUNTER
Call from pt, wondering about infusion wait list status or if there are openings at other locations.   Informed there have not been any add ons today in infusion, message sent to infusion nurses to double check, informed list of other locations comes between now and 10am, informed will call pt back when list of other location is available. Pt voiced understanding.

## 2024-12-23 NOTE — TELEPHONE ENCOUNTER
As of 1238 pt called in checking on infusion appt availability, as of current time no appointments available at other sites, including [place] and still on wait list for [facility] sites

## 2024-12-24 PROCEDURE — 250N000011 HC RX IP 250 OP 636: Performed by: EMERGENCY MEDICINE

## 2024-12-24 PROCEDURE — 96375 TX/PRO/DX INJ NEW DRUG ADDON: CPT | Performed by: EMERGENCY MEDICINE

## 2024-12-24 RX ORDER — HEPARIN SODIUM (PORCINE) LOCK FLUSH IV SOLN 100 UNIT/ML 100 UNIT/ML
100 SOLUTION INTRAVENOUS ONCE
Status: COMPLETED | OUTPATIENT
Start: 2024-12-24 | End: 2024-12-24

## 2024-12-24 RX ORDER — HEPARIN SODIUM (PORCINE) LOCK FLUSH IV SOLN 100 UNIT/ML 100 UNIT/ML
5-10 SOLUTION INTRAVENOUS
Status: DISCONTINUED | OUTPATIENT
Start: 2024-12-24 | End: 2024-12-24

## 2024-12-24 RX ADMIN — HEPARIN 100 UNITS: 100 SYRINGE at 00:27

## 2024-12-24 NOTE — ED PROVIDER NOTES
ED Provider Note  [facility]      History     Chief Complaint   Patient presents with    Sickle Cell Pain Crisis     HPI  Jennifer Cervantes is a 24 year old female with a history of sickle cell disease who presents to the emergency department for sickle cell pain. The patient states that she developed sickle cell pain this morning. Her pain is across her whole body. She notes that the location of her pain is normal but today her pain is more intense. She did call multiple infusion centers but was unable to come in as they were all full. She did try her at home pain regimen but it did not help. She does have a history of blood clots. She denies chest pain, pain in legs, issues breathing, fevers or chills    Past Medical History  Past Medical History:   Diagnosis Date    Anxiety     Bleeding disorder (H)     Blood clotting disorder (H)     Cerebral infarction (H) 2015    Cognitive developmental delay     low IQ. 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 side (H)     right hand contractures    Iron overload due to repeated red blood cell transfusions     Migraines     Multiple subsegmental pulmonary emboli without acute cor pulmonale (H) 02/01/2021    Oppositional defiant behavior     Presence of intrauterine contraceptive device 2/18/2020    Superficial venous thrombosis of arm, right 03/25/2021    Uncomplicated asthma      Past Surgical History:   Procedure Laterality Date    AS INSERT TUNNELED CV 2 CATH W/O PORT/PUMP      CHOLECYSTECTOMY      CV RIGHT HEART CATH MEASUREMENTS RECORDED N/A 11/18/2021    Procedure: Right Heart Cath;  Surgeon: Jackson Stauffer MD;  Location:  HEART CARDIAC CATH LAB    INSERT PORT VASCULAR ACCESS Left 4/21/2021    Procedure: INSERTION, VASCULAR ACCESS PORT (NOT SURE ON SIDE UNTIL REMOVAL);  Surgeon: Rajan More MD;  Location: [facility] [place]     IR CHEST PORT PLACEMENT > 5 YRS OF AGE  4/21/2021    IR CVC NON TUNNEL LINE REMOVAL  5/6/2021    IR CVC NON TUNNEL PLACEMENT > 5 YRS  04/07/2020    IR CVC NON TUNNEL PLACEMENT > 5 YRS  4/30/2021    IR CVC NON TUNNEL PLACEMENT > 5 YRS  9/7/2022    IR PORT REMOVAL LEFT  4/21/2021    REMOVE PORT VASCULAR ACCESS Left 4/21/2021    Procedure: REMOVAL, VASCULAR ACCESS PORT LEFT;  Surgeon: Rajan More MD;  Location: [facility] OR    REPAIR TENDON ELBOW Right 10/02/2019    Procedure: Right Elbow Flexor Lengthening, Flexor Pronator Slide Of Wrist and Finger, Thumb Adductor Lengthening;  Surgeon: Anai Franco MD;  Location: [facility] OR    TONSILLECTOMY Bilateral 10/02/2019    Procedure: Bilateral Tonsillectomy;  Surgeon: Farhana Guy MD;  Location: [facility] OR    ZZC BREAST REDUCTION (INCLUDES LIPO) TIER 3 Bilateral 04/23/2019     albuterol (PROVENTIL) (2.5 MG/3ML) 0.083% neb solution  aspirin (ASA) 81 MG chewable tablet  budesonide-formoterol (SYMBICORT) 160-4.5 MCG/ACT Inhaler  deferasirox (JADENU) 360 MG tablet  Deferiprone, Twice Daily, 1000 MG TABS  diphenhydrAMINE (BENADRYL) 50 MG capsule  EPINEPHrine (ANY BX GENERIC EQUIV) 0.3 MG/0.3ML injection 2-pack  gabapentin (NEURONTIN) 300 MG capsule  hydroxyurea (HYDREA) 500 MG capsule  ibuprofen (ADVIL/MOTRIN) 800 MG tablet  methocarbamol (ROBAXIN) 750 MG tablet  methylPREDNISolone (MEDROL) 32 MG tablet  naloxone (NARCAN) 4 MG/0.1ML nasal spray  oxyCODONE IR (ROXICODONE) 10 MG tablet      Allergies   Allergen Reactions    Contrast Dye Angioedema     Hives and breathing issues    Fish-Derived Products Hives    Seafood Hives    Adhesive Tape Hives     Primipore dressing causes hives    Gadolinium     Iodinated Contrast Media      Family History  Family History   Problem Relation Age of Onset    Sickle Cell Trait Mother     Hypertension Mother     Asthma Mother     Sickle Cell Trait Father     Glaucoma No family hx of     Macular Degeneration No family hx of      Diabetes No family hx of     Gout No family hx of      Social History   Social History     Tobacco Use    Smoking status: Never     Passive exposure: Never    Smokeless tobacco: Never   Substance Use Topics    Alcohol use: Not Currently     Alcohol/week: 0.0 standard drinks of alcohol    Drug use: Never      Past medical history, past surgical history, medications, allergies, family history, and social history were reviewed with the patient. No additional pertinent items.   A medically appropriate review of systems was performed with pertinent positives and negatives noted in the HPI, and all other systems negative.    Physical Exam   BP: 123/70  Pulse: 91  Temp: 97.9  F (36.6  C)  Resp: 16  SpO2: 98 %  Physical Exam  Constitutional:       General: She is not in acute distress.     Appearance: Normal appearance. She is well-developed.   HENT:      Head: Normocephalic and atraumatic.   Eyes:      General: No scleral icterus.     Conjunctiva/sclera: Conjunctivae normal.   Cardiovascular:      Rate and Rhythm: Normal rate.   Pulmonary:      Effort: Pulmonary effort is normal. No respiratory distress.   Abdominal:      General: Abdomen is flat.   Musculoskeletal:      Cervical back: Normal range of motion and neck supple.   Skin:     General: Skin is warm and dry.      Findings: No rash.   Neurological:      Mental Status: She is alert and oriented to person, place, and time.           ED Course, Procedures, & Data      Procedures            No results found for any visits on 12/23/24.  Medications - No data to display  Labs Ordered and Resulted from Time of ED Arrival to Time of ED Departure - No data to display  No orders to display          Critical care was not performed.     Medical Decision Making  The patient's presentation was of high complexity (a chronic illness severe exacerbation, progression, or side effect of treatment).    The patient's evaluation involved:  ordering and/or review of 3+ test(s) in this  encounter (see separate area of note for details)    The patient's management necessitated high risk (a parenteral controlled substance).    Assessment & Plan      24 year old female with a history of sickle cell disease who presents to the emergency department for sickle cell pain.  Vital signs stable and afebrile including normal pulse ox at 90% on room air.  IV established, labs sent which revealed a hemoglobin of 7.7 and a leukocytosis at 13.6 but otherwise normal CBC and electrolytes.  Elevated reticulocyte percentage at 20.3.  Patient was started on her care plan including Dilaudid and Toradol and Zofran IV along with a liter of LR IV fluid bolus.  Upon repeat assessment patient's symptoms are adequately controlled and she is requesting discharge home.    I have reviewed the nursing notes. I have reviewed the findings, diagnosis, plan and need for follow up with the patient.    New Prescriptions    No medications on file       Final diagnoses:   Sickle cell disease with crisis (H)   IKhadra, am serving as a trained medical scribe to document services personally performed by Dmitri Thomas MD, based on the provider's statements to me.     IDmitri MD, was physically present and have reviewed and verified the accuracy of this note documented by Khadra Martinez.     Dmitri Thomas MD  [facility] EMERGENCY DEPARTMENT  12/23/2024     Dmitri Thomas MD  12/24/24 [id number]

## 2024-12-26 ENCOUNTER — INFUSION THERAPY VISIT (OUTPATIENT)
Dept: INFUSION THERAPY | Facility: CLINIC | Age: 24
End: 2024-12-26
Attending: INTERNAL MEDICINE
Payer: COMMERCIAL

## 2024-12-26 ENCOUNTER — NURSE TRIAGE (OUTPATIENT)
Dept: ONCOLOGY | Facility: CLINIC | Age: 24
End: 2024-12-26
Payer: COMMERCIAL

## 2024-12-26 ENCOUNTER — PATIENT OUTREACH (OUTPATIENT)
Dept: CARE COORDINATION | Facility: CLINIC | Age: 24
End: 2024-12-26
Payer: COMMERCIAL

## 2024-12-26 VITALS
TEMPERATURE: 97.9 F | HEART RATE: 96 BPM | SYSTOLIC BLOOD PRESSURE: 110 MMHG | RESPIRATION RATE: 16 BRPM | DIASTOLIC BLOOD PRESSURE: 73 MMHG | OXYGEN SATURATION: 95 %

## 2024-12-26 DIAGNOSIS — G81.10 SPASTIC HEMIPLEGIA, UNSPECIFIED ETIOLOGY, UNSPECIFIED LATERALITY (H): ICD-10-CM

## 2024-12-26 DIAGNOSIS — D57.00 SICKLE CELL DISEASE WITH CRISIS (H): ICD-10-CM

## 2024-12-26 DIAGNOSIS — D57.00 SICKLE CELL PAIN CRISIS (H): Primary | ICD-10-CM

## 2024-12-26 PROCEDURE — 258N000003 HC RX IP 258 OP 636: Performed by: PEDIATRICS

## 2024-12-26 PROCEDURE — 250N000013 HC RX MED GY IP 250 OP 250 PS 637: Performed by: PEDIATRICS

## 2024-12-26 PROCEDURE — 250N000011 HC RX IP 250 OP 636: Performed by: PEDIATRICS

## 2024-12-26 RX ORDER — OXYCODONE HYDROCHLORIDE 10 MG/1
10 TABLET ORAL EVERY 6 HOURS PRN
Qty: 12 TABLET | Refills: 0 | Status: SHIPPED | OUTPATIENT
Start: 2024-12-26

## 2024-12-26 RX ORDER — HEPARIN SODIUM (PORCINE) LOCK FLUSH IV SOLN 100 UNIT/ML 100 UNIT/ML
5 SOLUTION INTRAVENOUS
Status: DISCONTINUED | OUTPATIENT
Start: 2024-12-26 | End: 2024-12-26 | Stop reason: HOSPADM

## 2024-12-26 RX ORDER — DIPHENHYDRAMINE HCL 25 MG
50 CAPSULE ORAL
Start: 2025-01-01

## 2024-12-26 RX ORDER — KETOROLAC TROMETHAMINE 30 MG/ML
30 INJECTION, SOLUTION INTRAMUSCULAR; INTRAVENOUS ONCE
Start: 2025-01-01 | End: 2025-01-01

## 2024-12-26 RX ORDER — ONDANSETRON 2 MG/ML
8 INJECTION INTRAMUSCULAR; INTRAVENOUS EVERY 6 HOURS PRN
Status: DISCONTINUED | OUTPATIENT
Start: 2024-12-26 | End: 2024-12-26 | Stop reason: HOSPADM

## 2024-12-26 RX ORDER — KETOROLAC TROMETHAMINE 30 MG/ML
30 INJECTION, SOLUTION INTRAMUSCULAR; INTRAVENOUS ONCE
Status: COMPLETED | OUTPATIENT
Start: 2024-12-26 | End: 2024-12-26

## 2024-12-26 RX ORDER — HEPARIN SODIUM,PORCINE 10 UNIT/ML
5 VIAL (ML) INTRAVENOUS
OUTPATIENT
Start: 2025-01-01

## 2024-12-26 RX ORDER — HEPARIN SODIUM (PORCINE) LOCK FLUSH IV SOLN 100 UNIT/ML 100 UNIT/ML
5 SOLUTION INTRAVENOUS
OUTPATIENT
Start: 2025-01-01

## 2024-12-26 RX ORDER — ONDANSETRON 4 MG/1
8 TABLET, FILM COATED ORAL
Start: 2025-01-01

## 2024-12-26 RX ORDER — ONDANSETRON 2 MG/ML
8 INJECTION INTRAMUSCULAR; INTRAVENOUS EVERY 6 HOURS PRN
Start: 2025-01-01

## 2024-12-26 RX ORDER — DIPHENHYDRAMINE HCL 25 MG
50 CAPSULE ORAL
Status: COMPLETED | OUTPATIENT
Start: 2024-12-26 | End: 2024-12-26

## 2024-12-26 RX ADMIN — HYDROMORPHONE HYDROCHLORIDE 1 MG: 1 INJECTION, SOLUTION INTRAMUSCULAR; INTRAVENOUS; SUBCUTANEOUS at 13:45

## 2024-12-26 RX ADMIN — HEPARIN SODIUM (PORCINE) LOCK FLUSH IV SOLN 100 UNIT/ML 5 ML: 100 SOLUTION at 16:23

## 2024-12-26 RX ADMIN — DIPHENHYDRAMINE HYDROCHLORIDE 50 MG: 25 CAPSULE ORAL at 13:47

## 2024-12-26 RX ADMIN — HYDROMORPHONE HYDROCHLORIDE 1 MG: 1 INJECTION, SOLUTION INTRAMUSCULAR; INTRAVENOUS; SUBCUTANEOUS at 14:42

## 2024-12-26 RX ADMIN — SODIUM CHLORIDE, POTASSIUM CHLORIDE, SODIUM LACTATE AND CALCIUM CHLORIDE 1000 ML: 600; 310; 30; 20 INJECTION, SOLUTION INTRAVENOUS at 13:42

## 2024-12-26 RX ADMIN — HYDROMORPHONE HYDROCHLORIDE 1 MG: 1 INJECTION, SOLUTION INTRAMUSCULAR; INTRAVENOUS; SUBCUTANEOUS at 15:53

## 2024-12-26 RX ADMIN — KETOROLAC TROMETHAMINE 30 MG: 30 INJECTION, SOLUTION INTRAMUSCULAR; INTRAVENOUS at 13:53

## 2024-12-26 RX ADMIN — ONDANSETRON 8 MG: 2 INJECTION INTRAMUSCULAR; INTRAVENOUS at 13:48

## 2024-12-26 NOTE — TELEPHONE ENCOUNTER
Narcotic Refill Request    Medication(s) requested:  oxycodone  Person Requesting Refill:Jennifer hong  What pain is the medication treating: sickle cell crisis pain  How is the medication being taken?:1 tablet every 6 hours when has crisis  Does pt have enough for today? no  Is pain being adequately controlled on the current regimen?: ok, requires intermittent IVF/Pain at times  Experiencing any side effects from medication?: denies    Date of most recent appointment:  12/19/2024 Farhan Johnson CNP  Any No Show Visits:none recently  Next appointment:   1/13/2025   Last fill date and by whom:  12/19/2024   Reviewed: yes      Send to provider: farhan johnson CNP and RNCURRY Mcgee

## 2024-12-26 NOTE — TELEPHONE ENCOUNTER
Jennifer calling checking in on status of appts. Pt will be at [facility] around 11:30am-12:00pmish if able to coordinate with when she will be picking up prescriptions.      As of 0930 no appt available, pt remains on wait list. .     1110 Offer for 1:30pm IVF/Pain with [facility], pt in agreement with plan and has own ride to clinic.    1112 Scheduling request sent to add onto pt/infusion schedule today.

## 2024-12-26 NOTE — PATIENT INSTRUCTIONS
Dear Jennifer Cervantes    Thank you for choosing [facility] ([facility] for your infusion.  The following information is a summary of our appointment as well as important reminders.          If you have any questions on your upcoming Specialty Infusion appointments, please call scheduling at [phone].  It was a pleasure taking care of you today.    Sincerely,    [facility]  [address] [place] [address]  [id number]  Phone:  [phone]

## 2024-12-26 NOTE — TELEPHONE ENCOUNTER
Oncology Nurse Triage - Sickle Cell Pain Crisis:    Situation: Jennifer  calling about Sickle Cell Pain Crisis, requesting to be added on for IV fluids and pain medicine    Background:     Patient's last infusion was ED infusion on Monday 12/22/2024.   Last clinic visit date:12/19/2024 Patricia Mantilla CNP  Does patient have active treatment plan? Yes    Assessment of Symptoms:  Onset/Duration of symptoms: 1 day    Is it typical sickle cell pain? Yes  Location: legs  Character: Sharp and Dull ache  Intensity: 10/10    Any radiation of pain, numbness, tingling, weakness, warmth, swelling, discoloration of arms or legs?No    Fever? No  Chest Pain Present: No  Shortness of breath: No    Other home therapies tried: HEAT/HEATING PAD and WARM BATH     Last home medication taken and when: last night around 9:00pm    Any Refills Needed?: Yes, oxycodone [address] If able by weekend.     Does patient have transportation & length of time to get to clinic: Yes  20minutes    Recommendations:   [id number] Patricia Mantilla CNP  [id number] approved by Patricia Mantilla CNP for IVF/Pain    If you do not hear from the infusion center by 2pm then you will not be able to get in for an infusion today. If symptoms worsen while waiting for call back, and/or you experience fever, chills, SOB, chest pain, cough, n/v, dizziness, numbness, swelling, discoloration of extremities, then seek emergency evaluation in Emergency Department.     Please note, if you are late for your appt, you risk losing your infusion appt as it may delay another patient's infusion who arrived on time.

## 2024-12-26 NOTE — PROGRESS NOTES
Infusion Nursing Note:  Jennifer Cervantes presents today for IVF, pain meds, anti emetics, anti histamine.    Patient seen by provider today: No   present during visit today: Not Applicable.    Note: Per orders and per patient request, below medications and infusion administered.    Administrations This Visit       diphenhydrAMINE (BENADRYL) capsule 50 mg       Admin Date  12/26/2024 Action  $Given Dose  50 mg Route  Oral Documented By  Lien Camacho RN              heparin lock flush 100 unit/mL injection 5 mL       Admin Date  12/26/2024 Action  $Given Dose  5 mL Route  Intracatheter Documented By  Lien Camacho RN              HYDROmorphone (DILAUDID) injection 1 mg       Admin Date  12/26/2024 Action  $Given Dose  1 mg Route  Intravenous Documented By  Lien Camacho RN               Admin Date  12/26/2024 Action  $Given Dose  1 mg Route  Intravenous Documented By  Lien Camacho RN               Admin Date  12/26/2024 Action  $Given Dose  1 mg Route  Intravenous Documented By  Echo Perez RN              ketorolac (TORADOL) injection 30 mg       Admin Date  12/26/2024 Action  $Given Dose  30 mg Route  Intravenous Documented By  Lien Camacho RN              lactated ringers BOLUS 1,000 mL       Admin Date  12/26/2024 Action  $New Bag Dose  1,000 mL Rate  500 mL/hr Route  Intravenous Documented By  Lien Camacho RN              ondansetron (ZOFRAN) injection 8 mg       Admin Date  12/26/2024 Action  $Given Dose  8 mg Route  Intravenous Documented By  Lien Camacho RN              sodium chloride (PF) 0.9% PF flush 3-20 mL       Admin Date  12/26/2024 Action  $Given Dose  20 mL Route  Intracatheter Documented By  Lien Camacho RN                   Intravenous Access:  Implanted Port.    Treatment Conditions:  None.      Post Infusion Assessment:  Patient tolerated infusion without incident.  Blood return noted pre and post infusion.  Site patent and intact, free from redness, edema or discomfort.  No evidence  of extravasations.       Discharge Plan:   Discharge instructions reviewed with: Patient.  Patient and/or family verbalized understanding of discharge instructions and all questions answered.  AVS to patient via MYCHART.  Patient will return to her provider/[facility] as needed for next appointment.   Patient discharged in stable condition accompanied by: self.  Departure Mode: Ambulatory.    /73 (BP Location: Right arm, Patient Position: Sitting, Cuff Size: Adult Regular)   Pulse 96   Temp 97.9  F (36.6  C) (Oral)   Resp 16   LMP  (LMP Unknown)   SpO2 95%      Lien Camacho RN

## 2024-12-26 NOTE — TELEPHONE ENCOUNTER
Pt calling to request assistance with transportation home from infusion today.     Message sent to SW to assist.

## 2024-12-27 ENCOUNTER — APPOINTMENT (OUTPATIENT)
Dept: GENERAL RADIOLOGY | Facility: CLINIC | Age: 24
End: 2024-12-27
Attending: EMERGENCY MEDICINE
Payer: COMMERCIAL

## 2024-12-27 ENCOUNTER — HOSPITAL ENCOUNTER (EMERGENCY)
Facility: CLINIC | Age: 24
Discharge: HOME | End: 2024-12-27
Attending: EMERGENCY MEDICINE | Admitting: EMERGENCY MEDICINE
Payer: COMMERCIAL

## 2024-12-27 VITALS
OXYGEN SATURATION: 96 % | WEIGHT: 150 LBS | DIASTOLIC BLOOD PRESSURE: 72 MMHG | BODY MASS INDEX: 25.61 KG/M2 | RESPIRATION RATE: 14 BRPM | HEIGHT: 64 IN | SYSTOLIC BLOOD PRESSURE: 111 MMHG | TEMPERATURE: 97.5 F

## 2024-12-27 DIAGNOSIS — D57.00 SICKLE CELL PAIN CRISIS (H): ICD-10-CM

## 2024-12-27 LAB
ALBUMIN SERPL BCG-MCNC: 4.5 G/DL (ref 3.5–5.2)
ALP SERPL-CCNC: 56 U/L (ref 40–150)
ALT SERPL W P-5'-P-CCNC: 25 U/L (ref 0–50)
ANION GAP SERPL CALCULATED.3IONS-SCNC: 10 MMOL/L (ref 7–15)
AST SERPL W P-5'-P-CCNC: 45 U/L (ref 0–45)
BASOPHILS # BLD AUTO: 0.3 10E3/UL (ref 0–0.2)
BASOPHILS NFR BLD AUTO: 3 %
BILIRUB SERPL-MCNC: 2.6 MG/DL
BUN SERPL-MCNC: 7.2 MG/DL (ref 6–20)
CALCIUM SERPL-MCNC: 9.1 MG/DL (ref 8.8–10.4)
CHLORIDE SERPL-SCNC: 109 MMOL/L (ref 98–107)
CREAT SERPL-MCNC: 0.54 MG/DL (ref 0.51–0.95)
EGFRCR SERPLBLD CKD-EPI 2021: >90 ML/MIN/1.73M2
EOSINOPHIL # BLD AUTO: 0.4 10E3/UL (ref 0–0.7)
EOSINOPHIL NFR BLD AUTO: 4 %
ERYTHROCYTE [DISTWIDTH] IN BLOOD BY AUTOMATED COUNT: 21.2 % (ref 10–15)
GLUCOSE SERPL-MCNC: 89 MG/DL (ref 70–99)
HCO3 SERPL-SCNC: 24 MMOL/L (ref 22–29)
HCT VFR BLD AUTO: 18.7 % (ref 35–47)
HGB BLD-MCNC: 6.8 G/DL (ref 11.7–15.7)
HOLD SPECIMEN: NORMAL
IMM GRANULOCYTES # BLD: 0.1 10E3/UL
IMM GRANULOCYTES NFR BLD: 1 %
LYMPHOCYTES # BLD AUTO: 2.3 10E3/UL (ref 0.8–5.3)
LYMPHOCYTES NFR BLD AUTO: 23 %
MCH RBC QN AUTO: 30.9 PG (ref 26.5–33)
MCHC RBC AUTO-ENTMCNC: 36.4 G/DL (ref 31.5–36.5)
MCV RBC AUTO: 85 FL (ref 78–100)
MONOCYTES # BLD AUTO: 1.2 10E3/UL (ref 0–1.3)
MONOCYTES NFR BLD AUTO: 12 %
NEUTROPHILS # BLD AUTO: 5.9 10E3/UL (ref 1.6–8.3)
NEUTROPHILS NFR BLD AUTO: 58 %
NRBC # BLD AUTO: 0.1 10E3/UL
NRBC BLD AUTO-RTO: 1 /100
PLATELET # BLD AUTO: 403 10E3/UL (ref 150–450)
POTASSIUM SERPL-SCNC: 4.2 MMOL/L (ref 3.4–5.3)
PROT SERPL-MCNC: 7.1 G/DL (ref 6.4–8.3)
RBC # BLD AUTO: 2.2 10E6/UL (ref 3.8–5.2)
SODIUM SERPL-SCNC: 143 MMOL/L (ref 135–145)
WBC # BLD AUTO: 10.1 10E3/UL (ref 4–11)

## 2024-12-27 PROCEDURE — 84295 ASSAY OF SERUM SODIUM: CPT | Performed by: EMERGENCY MEDICINE

## 2024-12-27 PROCEDURE — 96375 TX/PRO/DX INJ NEW DRUG ADDON: CPT | Performed by: EMERGENCY MEDICINE

## 2024-12-27 PROCEDURE — 99284 EMERGENCY DEPT VISIT MOD MDM: CPT | Mod: 25 | Performed by: EMERGENCY MEDICINE

## 2024-12-27 PROCEDURE — 96376 TX/PRO/DX INJ SAME DRUG ADON: CPT | Performed by: EMERGENCY MEDICINE

## 2024-12-27 PROCEDURE — 85048 AUTOMATED LEUKOCYTE COUNT: CPT | Performed by: EMERGENCY MEDICINE

## 2024-12-27 PROCEDURE — 71046 X-RAY EXAM CHEST 2 VIEWS: CPT

## 2024-12-27 PROCEDURE — 250N000011 HC RX IP 250 OP 636: Performed by: EMERGENCY MEDICINE

## 2024-12-27 PROCEDURE — 85004 AUTOMATED DIFF WBC COUNT: CPT | Performed by: EMERGENCY MEDICINE

## 2024-12-27 PROCEDURE — 36415 COLL VENOUS BLD VENIPUNCTURE: CPT | Performed by: EMERGENCY MEDICINE

## 2024-12-27 PROCEDURE — 99284 EMERGENCY DEPT VISIT MOD MDM: CPT | Performed by: EMERGENCY MEDICINE

## 2024-12-27 PROCEDURE — 96374 THER/PROPH/DIAG INJ IV PUSH: CPT | Performed by: EMERGENCY MEDICINE

## 2024-12-27 RX ORDER — HEPARIN SODIUM (PORCINE) LOCK FLUSH IV SOLN 100 UNIT/ML 100 UNIT/ML
100 SOLUTION INTRAVENOUS ONCE
Status: COMPLETED | OUTPATIENT
Start: 2024-12-27 | End: 2024-12-27

## 2024-12-27 RX ORDER — KETOROLAC TROMETHAMINE 15 MG/ML
15 INJECTION, SOLUTION INTRAMUSCULAR; INTRAVENOUS ONCE
Status: COMPLETED | OUTPATIENT
Start: 2024-12-27 | End: 2024-12-27

## 2024-12-27 RX ORDER — ONDANSETRON 2 MG/ML
8 INJECTION INTRAMUSCULAR; INTRAVENOUS EVERY 6 HOURS PRN
Status: DISCONTINUED | OUTPATIENT
Start: 2024-12-27 | End: 2024-12-27 | Stop reason: HOSPADM

## 2024-12-27 RX ADMIN — HYDROMORPHONE HYDROCHLORIDE 1 MG: 1 INJECTION, SOLUTION INTRAMUSCULAR; INTRAVENOUS; SUBCUTANEOUS at 18:00

## 2024-12-27 RX ADMIN — HYDROMORPHONE HYDROCHLORIDE 1 MG: 1 INJECTION, SOLUTION INTRAMUSCULAR; INTRAVENOUS; SUBCUTANEOUS at 19:29

## 2024-12-27 RX ADMIN — HYDROMORPHONE HYDROCHLORIDE 1 MG: 1 INJECTION, SOLUTION INTRAMUSCULAR; INTRAVENOUS; SUBCUTANEOUS at 16:36

## 2024-12-27 RX ADMIN — SODIUM CHLORIDE, PRESERVATIVE FREE 100 UNITS: 5 INJECTION INTRAVENOUS at 21:11

## 2024-12-27 RX ADMIN — KETOROLAC TROMETHAMINE 15 MG: 15 INJECTION, SOLUTION INTRAMUSCULAR; INTRAVENOUS at 16:39

## 2024-12-27 ASSESSMENT — ACTIVITIES OF DAILY LIVING (ADL)
ADLS_ACUITY_SCORE: 58

## 2024-12-27 NOTE — ED PROVIDER NOTES
ED PROVIDER NOTE  [facility]  [facility] [place]      History     Chief Complaint   Patient presents with    Sickle Cell Pain Crisis     The history is provided by the patient and medical records.     Jennifer Cervantes is a 24 year old sickle cell patient who presents Emergency Department with complaints of pain in her left chest and abdomen. She states that she has had this pain before. No new fever, no new chest pain or shortness of breath. She states she tried to get into the infusion clinic today without success so came to the ER for evaluation.  The patient does have a care plan that was reviewed and at this time the patient states she has no unusual symptoms with her pain and sickle cell disease.    This part of the document was transcribed by Bambi Tejeda, Medical Scribe.      I have reviewed the Medications, Allergies, Past Medical and Surgical History, and Social History in the [employer] system.    Past Medical History:   Diagnosis Date    Anxiety     Bleeding disorder (H)     Blood clotting disorder (H)     Cerebral infarction (H) 2015    Cognitive developmental delay     low IQ. 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 side (H)     right hand contractures    Iron overload due to repeated red blood cell transfusions     Migraines     Multiple subsegmental pulmonary emboli without acute cor pulmonale (H) 02/01/2021    Oppositional defiant behavior     Presence of intrauterine contraceptive device 2/18/2020    Superficial venous thrombosis of arm, right 03/25/2021    Uncomplicated asthma        Past Surgical History:   Procedure Laterality Date    AS INSERT TUNNELED CV 2 CATH W/O PORT/PUMP      CHOLECYSTECTOMY      CV RIGHT HEART CATH MEASUREMENTS RECORDED N/A 11/18/2021    Procedure: Right Heart Cath;  Surgeon: Jackson Stauffer MD;  Location:  HEART CARDIAC CATH LAB     INSERT PORT VASCULAR ACCESS Left 4/21/2021    Procedure: INSERTION, VASCULAR ACCESS PORT (NOT SURE ON SIDE UNTIL REMOVAL);  Surgeon: Rajan More MD;  Location: [facility] OR    IR CHEST PORT PLACEMENT > 5 YRS OF AGE  4/21/2021    IR CVC NON TUNNEL LINE REMOVAL  5/6/2021    IR CVC NON TUNNEL PLACEMENT > 5 YRS  04/07/2020    IR CVC NON TUNNEL PLACEMENT > 5 YRS  4/30/2021    IR CVC NON TUNNEL PLACEMENT > 5 YRS  9/7/2022    IR PORT REMOVAL LEFT  4/21/2021    REMOVE PORT VASCULAR ACCESS Left 4/21/2021    Procedure: REMOVAL, VASCULAR ACCESS PORT LEFT;  Surgeon: Rajan More MD;  Location: [facility] OR    REPAIR TENDON ELBOW Right 10/02/2019    Procedure: Right Elbow Flexor Lengthening, Flexor Pronator Slide Of Wrist and Finger, Thumb Adductor Lengthening;  Surgeon: Anai Franco MD;  Location: [facility] OR    TONSILLECTOMY Bilateral 10/02/2019    Procedure: Bilateral Tonsillectomy;  Surgeon: Farhana Guy MD;  Location: [facility] OR    ZZC BREAST REDUCTION (INCLUDES LIPO) TIER 3 Bilateral 04/23/2019         Dose / Directions   albuterol (2.5 MG/3ML) 0.083% neb solution  Commonly known as: PROVENTIL  Used for: Asthmatic bronchitis without complication, unspecified asthma severity, unspecified whether persistent      Dose: 5 mg  Take 2 vials (5 mg) by nebulization every 6 hours as needed for shortness of breath or wheezing.  Quantity: 90 mL  Refills: 3     aspirin 81 MG chewable tablet  Commonly known as: ASA  Used for: Superficial venous thrombosis of arm, right      Dose: 81 mg  Take 1 tablet (81 mg) by mouth 2 times daily  Quantity: 60 tablet  Refills: 11     budesonide-formoterol 160-4.5 MCG/ACT Inhaler  Commonly known as: SYMBICORT  Used for: Moderate persistent asthma, unspecified whether complicated      Inhale 2 puffs twice daily plus 1-2 puffs as needed. May use up to 12 puffs per day.  Quantity: 20.4 g  Refills: 11     deferasirox 360 MG tablet  Commonly known as: JADENU  Used for: Iron overload due to repeated  red blood cell transfusions      Dose: 1,440 mg  Take 4 tablets (1,440 mg) by mouth daily.  Quantity: 120 tablet  Refills: 11     Deferiprone (Twice Daily) 1000 MG Tabs  Used for: Iron overload due to repeated red blood cell transfusions      Dose: 2,000 mg  Take 2,000 mg by mouth 2 times daily.  Quantity: 150 tablet  Refills: 3     diphenhydrAMINE 50 MG capsule  Commonly known as: BENADRYL  Used for: Sickle cell pain crisis (H)      Administer 12  hours pre - IV contrast injection and 2 hours prior to contrast. Patient must have a .  Quantity: 2 capsule  Refills: 0     EPINEPHrine 0.3 MG/0.3ML injection 2-pack  Commonly known as: ANY BX GENERIC EQUIV  Used for: Anaphylaxis, sequela      Dose: 0.3 mg  Inject 0.3 mLs (0.3 mg) into the muscle as needed for anaphylaxis.  Quantity: 1 each  Refills: 1     gabapentin 300 MG capsule  Commonly known as: NEURONTIN  Used for: Other chronic pain      Dose: 300 mg  Take 1 capsule (300 mg) by mouth 3 times daily. Take PO 1 pill in evening (300 mg) TID to start. If tolerated, increase by 300 mg in morning or lunch every few days, updating your doctor's office in time to get refills. The maximum dose is 900 mg TID.  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 Okay to take scheduled while in pain).  Quantity: 60 tablet  Refills: 1     methylPREDNISolone 32 MG tablet  Commonly known as: Medrol  Used for: Sickle cell pain  "crisis (H)      Take 1 tab 12 hours before appointment and 1 tab 2 hours prior to the procedure with IV contrast.  Quantity: 2 tablet  Refills: 0     naloxone 4 MG/0.1ML nasal spray  Commonly known as: NARCAN      Dose: 4 mg  Spray 4 mg into one nostril alternating nostrils as needed for opioid reversal. every 2-3 minutes until assistance arrives  Quantity: 0.2 mL  Refills: 0     oxyCODONE IR 10 MG tablet  Commonly known as: ROXICODONE  Used for: Sickle cell disease with crisis (H)      Dose: 10 mg  Take 1 tablet (10 mg) by mouth every 6 hours as needed for severe pain (Goal of less than 4 per day).  Quantity: 12 tablet  Refills: 0         Past medical history, past surgical history, medications, and allergies were reviewed with the patient. Additional pertinent items: None    Family History   Problem Relation Age of Onset    Sickle Cell Trait Mother     Hypertension Mother     Asthma Mother     Sickle Cell Trait Father     Glaucoma No family hx of     Macular Degeneration No family hx of     Diabetes No family hx of     Gout No family hx of        Social History     Tobacco Use    Smoking status: Never     Passive exposure: Never    Smokeless tobacco: Never   Substance Use Topics    Alcohol use: Not Currently     Alcohol/week: 0.0 standard drinks of alcohol     Social history was reviewed with the patient. Additional pertinent items: None    Allergies   Allergen Reactions    Contrast Dye Angioedema     Hives and breathing issues    Fish-Derived Products Hives    Seafood Hives    Adhesive Tape Hives     Primipore dressing causes hives    Gadolinium     Iodinated Contrast Media        Review of Systems  A complete review of systems was performed with pertinent positives and negatives noted in the HPI, and all other systems negative.    Physical Exam   BP: 115/68  Temp: 97.5  F (36.4  C)  Height: 162.6 cm (5' 4\")  Weight: 68 kg (150 lb)  SpO2: 91 % (patient states that 91 is not unusual for her especially when getting " Dilaudid.  Patient was rechecked by me on her left hand and found to be 96% on room air)      Physical Exam  Vitals and nursing note reviewed.   Constitutional:       Appearance: She is not ill-appearing.   HENT:      Head: Atraumatic.   Eyes:      Extraocular Movements: Extraocular movements intact.      Pupils: Pupils are equal, round, and reactive to light.   Cardiovascular:      Rate and Rhythm: Regular rhythm.   Pulmonary:      Breath sounds: Normal breath sounds. No wheezing or rales.   Neurological:      Mental Status: She is alert and oriented to person, place, and time.      Comments: Right arm paralysis, chronic   Psychiatric:         Mood and Affect: Mood normal.         ED Course     Orders Placed This Encounter   Procedures    Comprehensive metabolic panel    CBC with platelets and differential    Extra Tube    Extra Blue Top Tube    Peripheral IV catheter    CBC with platelets differential     Medications   sodium chloride (PF) 0.9% PF flush 3 mL (3 mLs Intracatheter $Given 12/27/24 1640)   sodium chloride (PF) 0.9% PF flush 3 mL (has no administration in time range)   HYDROmorphone (DILAUDID) injection 1 mg (1 mg Intravenous $Given 12/27/24 1636)   ondansetron (ZOFRAN) injection 8 mg (has no administration in time range)   ketorolac (TORADOL) injection 15 mg (15 mg Intravenous $Given 12/27/24 1639)          Procedures          Results for orders placed or performed during the hospital encounter of 12/27/24 (from the past 24 hours)   CBC with platelets differential    Narrative    The following orders were created for panel order CBC with platelets differential.  Procedure                               Abnormality         Status                     ---------                               -----------         ------                     CBC with platelets and d...[[id number]]                      In process                   Please view results for these tests on the individual orders.   Comprehensive  metabolic panel   Result Value Ref Range    Sodium 143 135 - 145 mmol/L    Potassium 4.2 3.4 - 5.3 mmol/L    Carbon Dioxide (CO2) 24 22 - 29 mmol/L    Anion Gap 10 7 - 15 mmol/L    Urea Nitrogen 7.2 6.0 - 20.0 mg/dL    Creatinine 0.54 0.51 - 0.95 mg/dL    GFR Estimate >90 >60 mL/min/1.73m2    Calcium 9.1 8.8 - 10.4 mg/dL    Chloride 109 (H) 98 - 107 mmol/L    Glucose 89 70 - 99 mg/dL    Alkaline Phosphatase 56 40 - 150 U/L    AST 45 0 - 45 U/L    ALT 25 0 - 50 U/L    Protein Total 7.1 6.4 - 8.3 g/dL    Albumin 4.5 3.5 - 5.2 g/dL    Bilirubin Total 2.6 (H) <=1.2 mg/dL   Extra Tube    Narrative    The following orders were created for panel order Extra Tube.  Procedure                               Abnormality         Status                     ---------                               -----------         ------                     Extra Blue Top Tube[[id number]]                              In process                   Please view results for these tests on the individual orders.     *Note: Due to a large number of results and/or encounters for the requested time period, some results have not been displayed. A complete set of results can be found in Results Review.       Critical care was not performed.     Medical Decision Making  The patient's presentation was of moderate complexity (a chronic illness mild to moderate exacerbation, progression, or side effect of treatment).    The patient's evaluation involved:  ordering and/or review of 3+ test(s) in this encounter (see above)    The patient's management necessitated further care after sign-out to one of my partners (see their note for further management).      Assessments & Plan (with Medical Decision Making)     I have reviewed the nursing notes.    Patient's treatment plan was followed and at this time the patient is resting comfortably.  Case will be signed out to 1 my partners will follow-up with the patient's final disposition.      Working diagnoses:   Sickle  cell pain crisis (H)       CLAIRE KEARNS MD    12/27/2024   [facility] EMERGENCY DEPARTMENT          Claire Kearns MD  12/27/24 1020

## 2024-12-30 ENCOUNTER — NURSE TRIAGE (OUTPATIENT)
Dept: ONCOLOGY | Facility: CLINIC | Age: 24
End: 2024-12-30
Payer: COMMERCIAL

## 2024-12-30 ENCOUNTER — HOSPITAL ENCOUNTER (EMERGENCY)
Facility: CLINIC | Age: 24
Discharge: HOME | End: 2024-12-30
Attending: STUDENT IN AN ORGANIZED HEALTH CARE EDUCATION/TRAINING PROGRAM | Admitting: STUDENT IN AN ORGANIZED HEALTH CARE EDUCATION/TRAINING PROGRAM
Payer: COMMERCIAL

## 2024-12-30 ENCOUNTER — APPOINTMENT (OUTPATIENT)
Dept: GENERAL RADIOLOGY | Facility: CLINIC | Age: 24
End: 2024-12-30
Attending: PHYSICIAN ASSISTANT
Payer: COMMERCIAL

## 2024-12-30 VITALS
DIASTOLIC BLOOD PRESSURE: 80 MMHG | SYSTOLIC BLOOD PRESSURE: 124 MMHG | RESPIRATION RATE: 18 BRPM | OXYGEN SATURATION: 91 % | HEART RATE: 94 BPM | TEMPERATURE: 98.4 F

## 2024-12-30 DIAGNOSIS — D57.00 SICKLE CELL DISEASE WITH CRISIS (H): ICD-10-CM

## 2024-12-30 LAB
ALBUMIN SERPL BCG-MCNC: 4.8 G/DL (ref 3.5–5.2)
ALP SERPL-CCNC: 67 U/L (ref 40–150)
ALT SERPL W P-5'-P-CCNC: 29 U/L (ref 0–50)
ANION GAP SERPL CALCULATED.3IONS-SCNC: 12 MMOL/L (ref 7–15)
AST SERPL W P-5'-P-CCNC: 45 U/L (ref 0–45)
BASOPHILS # BLD AUTO: 0.3 10E3/UL (ref 0–0.2)
BASOPHILS # BLD MANUAL: 0.2 10E3/UL (ref 0–0.2)
BASOPHILS NFR BLD AUTO: 2 %
BASOPHILS NFR BLD MANUAL: 2 %
BILIRUB SERPL-MCNC: 2.4 MG/DL
BUN SERPL-MCNC: 8.6 MG/DL (ref 6–20)
CALCIUM SERPL-MCNC: 9.2 MG/DL (ref 8.8–10.4)
CHLORIDE SERPL-SCNC: 104 MMOL/L (ref 98–107)
CREAT SERPL-MCNC: 0.5 MG/DL (ref 0.51–0.95)
EGFRCR SERPLBLD CKD-EPI 2021: >90 ML/MIN/1.73M2
EOSINOPHIL # BLD AUTO: 0.4 10E3/UL (ref 0–0.7)
EOSINOPHIL # BLD MANUAL: 0.6 10E3/UL (ref 0–0.7)
EOSINOPHIL NFR BLD AUTO: 3 %
EOSINOPHIL NFR BLD MANUAL: 4 %
ERYTHROCYTE [DISTWIDTH] IN BLOOD BY AUTOMATED COUNT: 26.4 % (ref 10–15)
GLUCOSE SERPL-MCNC: 83 MG/DL (ref 70–99)
HCG SERPL QL: NEGATIVE
HCO3 SERPL-SCNC: 22 MMOL/L (ref 22–29)
HCT VFR BLD AUTO: 20.6 % (ref 35–47)
HGB BLD-MCNC: 7.4 G/DL (ref 11.7–15.7)
IMM GRANULOCYTES # BLD: 0.1 10E3/UL
IMM GRANULOCYTES NFR BLD: 1 %
LYMPHOCYTES # BLD AUTO: 2.5 10E3/UL (ref 0.8–5.3)
LYMPHOCYTES # BLD MANUAL: 2.8 10E3/UL (ref 0.8–5.3)
LYMPHOCYTES NFR BLD AUTO: 20 %
LYMPHOCYTES NFR BLD MANUAL: 22 %
MCH RBC QN AUTO: 31.8 PG (ref 26.5–33)
MCHC RBC AUTO-ENTMCNC: 35.9 G/DL (ref 31.5–36.5)
MCV RBC AUTO: 88 FL (ref 78–100)
METAMYELOCYTES # BLD MANUAL: 0.1 10E3/UL
METAMYELOCYTES NFR BLD MANUAL: 1 %
MONOCYTES # BLD AUTO: 1 10E3/UL (ref 0–1.3)
MONOCYTES # BLD MANUAL: 0.7 10E3/UL (ref 0–1.3)
MONOCYTES NFR BLD AUTO: 8 %
MONOCYTES NFR BLD MANUAL: 5 %
NEUTROPHILS # BLD AUTO: 8.6 10E3/UL (ref 1.6–8.3)
NEUTROPHILS # BLD MANUAL: 8.5 10E3/UL (ref 1.6–8.3)
NEUTROPHILS NFR BLD AUTO: 67 %
NEUTROPHILS NFR BLD MANUAL: 66 %
NRBC # BLD AUTO: 0.8 10E3/UL
NRBC # BLD AUTO: 1.2 10E3/UL
NRBC BLD AUTO-RTO: 6 /100
NRBC BLD MANUAL-RTO: 10 %
PLAT MORPH BLD: ABNORMAL
PLATELET # BLD AUTO: 473 10E3/UL (ref 150–450)
POTASSIUM SERPL-SCNC: 4.2 MMOL/L (ref 3.4–5.3)
PROT SERPL-MCNC: 7.9 G/DL (ref 6.4–8.3)
RBC # BLD AUTO: 2.33 10E6/UL (ref 3.8–5.2)
RBC MORPH BLD: ABNORMAL
RETICS # AUTO: 0.62 10E6/UL (ref 0.03–0.1)
RETICS/RBC NFR AUTO: 25.6 % (ref 0.5–2)
SICKLE CELLS BLD QL SMEAR: ABNORMAL
SODIUM SERPL-SCNC: 138 MMOL/L (ref 135–145)
TROPONIN T SERPL HS-MCNC: <6 NG/L
WBC # BLD AUTO: 12.9 10E3/UL (ref 4–11)

## 2024-12-30 PROCEDURE — 82247 BILIRUBIN TOTAL: CPT | Performed by: PHYSICIAN ASSISTANT

## 2024-12-30 PROCEDURE — 258N000003 HC RX IP 258 OP 636: Performed by: PHYSICIAN ASSISTANT

## 2024-12-30 PROCEDURE — 36415 COLL VENOUS BLD VENIPUNCTURE: CPT | Performed by: PHYSICIAN ASSISTANT

## 2024-12-30 PROCEDURE — 250N000011 HC RX IP 250 OP 636: Performed by: PHYSICIAN ASSISTANT

## 2024-12-30 PROCEDURE — 85004 AUTOMATED DIFF WBC COUNT: CPT | Performed by: PHYSICIAN ASSISTANT

## 2024-12-30 PROCEDURE — 71046 X-RAY EXAM CHEST 2 VIEWS: CPT

## 2024-12-30 PROCEDURE — 99285 EMERGENCY DEPT VISIT HI MDM: CPT | Performed by: STUDENT IN AN ORGANIZED HEALTH CARE EDUCATION/TRAINING PROGRAM

## 2024-12-30 PROCEDURE — 85045 AUTOMATED RETICULOCYTE COUNT: CPT | Performed by: PHYSICIAN ASSISTANT

## 2024-12-30 PROCEDURE — 84484 ASSAY OF TROPONIN QUANT: CPT | Performed by: PHYSICIAN ASSISTANT

## 2024-12-30 PROCEDURE — 96361 HYDRATE IV INFUSION ADD-ON: CPT | Performed by: STUDENT IN AN ORGANIZED HEALTH CARE EDUCATION/TRAINING PROGRAM

## 2024-12-30 PROCEDURE — 85007 BL SMEAR W/DIFF WBC COUNT: CPT | Performed by: PHYSICIAN ASSISTANT

## 2024-12-30 PROCEDURE — 96376 TX/PRO/DX INJ SAME DRUG ADON: CPT | Performed by: STUDENT IN AN ORGANIZED HEALTH CARE EDUCATION/TRAINING PROGRAM

## 2024-12-30 PROCEDURE — 96374 THER/PROPH/DIAG INJ IV PUSH: CPT | Performed by: STUDENT IN AN ORGANIZED HEALTH CARE EDUCATION/TRAINING PROGRAM

## 2024-12-30 PROCEDURE — 84703 CHORIONIC GONADOTROPIN ASSAY: CPT | Performed by: PHYSICIAN ASSISTANT

## 2024-12-30 PROCEDURE — 99284 EMERGENCY DEPT VISIT MOD MDM: CPT | Mod: 25 | Performed by: STUDENT IN AN ORGANIZED HEALTH CARE EDUCATION/TRAINING PROGRAM

## 2024-12-30 PROCEDURE — 96375 TX/PRO/DX INJ NEW DRUG ADDON: CPT | Performed by: STUDENT IN AN ORGANIZED HEALTH CARE EDUCATION/TRAINING PROGRAM

## 2024-12-30 RX ORDER — ONDANSETRON 2 MG/ML
4 INJECTION INTRAMUSCULAR; INTRAVENOUS ONCE
Status: COMPLETED | OUTPATIENT
Start: 2024-12-30 | End: 2024-12-30

## 2024-12-30 RX ORDER — KETOROLAC TROMETHAMINE 30 MG/ML
30 INJECTION, SOLUTION INTRAMUSCULAR; INTRAVENOUS ONCE
Status: COMPLETED | OUTPATIENT
Start: 2024-12-30 | End: 2024-12-30

## 2024-12-30 RX ORDER — HEPARIN SODIUM (PORCINE) LOCK FLUSH IV SOLN 100 UNIT/ML 100 UNIT/ML
5-10 SOLUTION INTRAVENOUS
Status: DISCONTINUED | OUTPATIENT
Start: 2024-12-30 | End: 2024-12-30 | Stop reason: HOSPADM

## 2024-12-30 RX ADMIN — HYDROMORPHONE HYDROCHLORIDE 1 MG: 1 INJECTION, SOLUTION INTRAMUSCULAR; INTRAVENOUS; SUBCUTANEOUS at 19:16

## 2024-12-30 RX ADMIN — ONDANSETRON 4 MG: 2 INJECTION INTRAMUSCULAR; INTRAVENOUS at 19:23

## 2024-12-30 RX ADMIN — HYDROMORPHONE HYDROCHLORIDE 1 MG: 1 INJECTION, SOLUTION INTRAMUSCULAR; INTRAVENOUS; SUBCUTANEOUS at 17:54

## 2024-12-30 RX ADMIN — HYDROMORPHONE HYDROCHLORIDE 1 MG: 1 INJECTION, SOLUTION INTRAMUSCULAR; INTRAVENOUS; SUBCUTANEOUS at 20:31

## 2024-12-30 RX ADMIN — KETOROLAC TROMETHAMINE 30 MG: 30 INJECTION, SOLUTION INTRAMUSCULAR at 17:53

## 2024-12-30 RX ADMIN — SODIUM CHLORIDE, POTASSIUM CHLORIDE, SODIUM LACTATE AND CALCIUM CHLORIDE 1000 ML: 600; 310; 30; 20 INJECTION, SOLUTION INTRAVENOUS at 17:53

## 2024-12-30 RX ADMIN — HEPARIN SODIUM (PORCINE) LOCK FLUSH IV SOLN 100 UNIT/ML 5 ML: 100 SOLUTION at 21:24

## 2024-12-30 ASSESSMENT — ACTIVITIES OF DAILY LIVING (ADL)
ADLS_ACUITY_SCORE: 58

## 2024-12-30 NOTE — ED TRIAGE NOTES
"Pt reports \"sickle cell crisis\" all over pain and chest feels  like \"someone sitting on my chest\" pt reports I been trying to call the the transfusion clinic. But unable to reach them because of the holidays        "

## 2024-12-30 NOTE — ED PROVIDER NOTES
[facility] EMERGENCY DEPARTMENT ([facility])    12/30/24      ED PROVIDER NOTE   History     Chief Complaint   Patient presents with    Sickle Cell Pain Crisis     HPI  23yo F pmhx sickle cell disease c/b CVA and acute chest who presents with sickle cell pain flare.  She tried to get into the infusion center today but they were full.  Reports pain in her right hip and lumbar spine consistent with prior crises.  She also endorses chest pressure, but denies SOB, cough, fever, chills.  Does not feel the symptoms are consistent with her prior acute chest.    Past Medical History  Past Medical History:   Diagnosis Date    Anxiety     Bleeding disorder (H)     Blood clotting disorder (H)     Cerebral infarction (H) 2015    Cognitive developmental delay     low IQ. 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 side (H)     right hand contractures    Iron overload due to repeated red blood cell transfusions     Migraines     Multiple subsegmental pulmonary emboli without acute cor pulmonale (H) 02/01/2021    Oppositional defiant behavior     Presence of intrauterine contraceptive device 2/18/2020    Superficial venous thrombosis of arm, right 03/25/2021    Uncomplicated asthma      Past Surgical History:   Procedure Laterality Date    AS INSERT TUNNELED CV 2 CATH W/O PORT/PUMP      CHOLECYSTECTOMY      CV RIGHT HEART CATH MEASUREMENTS RECORDED N/A 11/18/2021    Procedure: Right Heart Cath;  Surgeon: Jackson Stauffer MD;  Location:  HEART CARDIAC CATH LAB    INSERT PORT VASCULAR ACCESS Left 4/21/2021    Procedure: INSERTION, VASCULAR ACCESS PORT (NOT SURE ON SIDE UNTIL REMOVAL);  Surgeon: Rajan More MD;  Location: [facility] OR    IR CHEST PORT PLACEMENT > 5 YRS OF AGE  4/21/2021    IR CVC NON TUNNEL LINE REMOVAL  5/6/2021    IR CVC NON TUNNEL PLACEMENT > 5 YRS  04/07/2020    IR CVC NON TUNNEL  PLACEMENT > 5 YRS  4/30/2021    IR CVC NON TUNNEL PLACEMENT > 5 YRS  9/7/2022    IR PORT REMOVAL LEFT  4/21/2021    REMOVE PORT VASCULAR ACCESS Left 4/21/2021    Procedure: REMOVAL, VASCULAR ACCESS PORT LEFT;  Surgeon: Rajan More MD;  Location: [facility] OR    REPAIR TENDON ELBOW Right 10/02/2019    Procedure: Right Elbow Flexor Lengthening, Flexor Pronator Slide Of Wrist and Finger, Thumb Adductor Lengthening;  Surgeon: Anai Franco MD;  Location: [facility] OR    TONSILLECTOMY Bilateral 10/02/2019    Procedure: Bilateral Tonsillectomy;  Surgeon: Farhana Guy MD;  Location: [facility] OR    ZZC BREAST REDUCTION (INCLUDES LIPO) TIER 3 Bilateral 04/23/2019     albuterol (PROVENTIL) (2.5 MG/3ML) 0.083% neb solution  aspirin (ASA) 81 MG chewable tablet  budesonide-formoterol (SYMBICORT) 160-4.5 MCG/ACT Inhaler  deferasirox (JADENU) 360 MG tablet  Deferiprone, Twice Daily, 1000 MG TABS  diphenhydrAMINE (BENADRYL) 50 MG capsule  EPINEPHrine (ANY BX GENERIC EQUIV) 0.3 MG/0.3ML injection 2-pack  gabapentin (NEURONTIN) 300 MG capsule  hydroxyurea (HYDREA) 500 MG capsule  ibuprofen (ADVIL/MOTRIN) 800 MG tablet  methocarbamol (ROBAXIN) 750 MG tablet  methylPREDNISolone (MEDROL) 32 MG tablet  naloxone (NARCAN) 4 MG/0.1ML nasal spray  oxyCODONE IR (ROXICODONE) 10 MG tablet      Allergies   Allergen Reactions    Contrast Dye Angioedema     Hives and breathing issues    Fish-Derived Products Hives    Seafood Hives    Adhesive Tape Hives     Primipore dressing causes hives    Gadolinium     Iodinated Contrast Media      Family History  Family History   Problem Relation Age of Onset    Sickle Cell Trait Mother     Hypertension Mother     Asthma Mother     Sickle Cell Trait Father     Glaucoma No family hx of     Macular Degeneration No family hx of     Diabetes No family hx of     Gout No family hx of      Social History   Social History     Tobacco Use    Smoking status: Never     Passive exposure: Never    Smokeless  tobacco: Never   Substance Use Topics    Alcohol use: Not Currently     Alcohol/week: 0.0 standard drinks of alcohol    Drug use: Never      A medically appropriate review of systems was performed with pertinent positives and negatives noted in the HPI, and all other systems negative.    Physical Exam   BP: 111/76  Pulse: 94  Temp: 98.4  F (36.9  C)  Resp: 18  SpO2: 94 %  Physical Exam  Constitutional:       General: She is not in acute distress.     Appearance: Normal appearance. She is not diaphoretic.   HENT:      Head: Atraumatic.      Mouth/Throat:      Mouth: Mucous membranes are moist.   Eyes:      Conjunctiva/sclera: Conjunctivae normal.   Cardiovascular:      Rate and Rhythm: Normal rate.   Pulmonary:      Effort: Pulmonary effort is normal. No respiratory distress.   Musculoskeletal:      Cervical back: Neck supple.   Skin:     General: Skin is warm.   Neurological:      Mental Status: She is alert.           ED Course, Procedures, & Data      Procedures                Results for orders placed or performed during the hospital encounter of 12/30/24   XR Chest 2 Views     Status: None    Narrative    EXAM: XR CHEST 2 VIEWS  LOCATION: [facility]  DATE: 12/30/2024    INDICATION: Sickle Cell disease. Assess acute chest.  COMPARISON: 12/27/2024.      Impression    IMPRESSION: No definite acute infiltrates.   Comprehensive metabolic panel     Status: Abnormal   Result Value Ref Range    Sodium 138 135 - 145 mmol/L    Potassium 4.2 3.4 - 5.3 mmol/L    Carbon Dioxide (CO2) 22 22 - 29 mmol/L    Anion Gap 12 7 - 15 mmol/L    Urea Nitrogen 8.6 6.0 - 20.0 mg/dL    Creatinine 0.50 (L) 0.51 - 0.95 mg/dL    GFR Estimate >90 >60 mL/min/1.73m2    Calcium 9.2 8.8 - 10.4 mg/dL    Chloride 104 98 - 107 mmol/L    Glucose 83 70 - 99 mg/dL    Alkaline Phosphatase 67 40 - 150 U/L    AST 45 0 - 45 U/L    ALT 29 0 - 50 U/L    Protein Total 7.9 6.4 - 8.3 g/dL    Albumin 4.8 3.5 - 5.2  g/dL    Bilirubin Total 2.4 (H) <=1.2 mg/dL   Reticulocyte count     Status: Abnormal   Result Value Ref Range    % Reticulocyte 25.6 (H) 0.5 - 2.0 %    Absolute Reticulocyte 0.620 (H) 0.025 - 0.095 10e6/uL   HCG qualitative pregnancy (blood)     Status: Normal   Result Value Ref Range    hCG Serum Qualitative Negative Negative   Troponin T, High Sensitivity     Status: Normal   Result Value Ref Range    Troponin T, High Sensitivity <6 <=14 ng/L   CBC with platelets and differential     Status: Abnormal   Result Value Ref Range    WBC Count 12.9 (H) 4.0 - 11.0 10e3/uL    RBC Count 2.33 (L) 3.80 - 5.20 10e6/uL    Hemoglobin 7.4 (L) 11.7 - 15.7 g/dL    Hematocrit 20.6 (L) 35.0 - 47.0 %    MCV 88 78 - 100 fL    MCH 31.8 26.5 - 33.0 pg    MCHC 35.9 31.5 - 36.5 g/dL    RDW 26.4 (H) 10.0 - 15.0 %    Platelet Count 473 (H) 150 - 450 10e3/uL    % Neutrophils 67 %    % Lymphocytes 20 %    % Monocytes 8 %    % Eosinophils 3 %    % Basophils 2 %    % Immature Granulocytes 1 %    NRBCs per 100 WBC 6 (H) <1 /100    Absolute Neutrophils 8.6 (H) 1.6 - 8.3 10e3/uL    Absolute Lymphocytes 2.5 0.8 - 5.3 10e3/uL    Absolute Monocytes 1.0 0.0 - 1.3 10e3/uL    Absolute Eosinophils 0.4 0.0 - 0.7 10e3/uL    Absolute Basophils 0.3 (H) 0.0 - 0.2 10e3/uL    Absolute Immature Granulocytes 0.1 <=0.4 10e3/uL    Absolute NRBCs 0.8 10e3/uL   Manual Differential     Status: Abnormal   Result Value Ref Range    % Neutrophils 66 %    % Lymphocytes 22 %    % Monocytes 5 %    % Eosinophils 4 %    % Basophils 2 %    % Metamyelocytes 1 %    NRBCs per 100 WBC 10 (H) <=0 %    Absolute Neutrophils 8.5 (H) 1.6 - 8.3 10e3/uL    Absolute Lymphocytes 2.8 0.8 - 5.3 10e3/uL    Absolute Monocytes 0.7 0.0 - 1.3 10e3/uL    Absolute Eosinophils 0.6 0.0 - 0.7 10e3/uL    Absolute Basophils 0.2 0.0 - 0.2 10e3/uL    Absolute Metamyelocytes 0.1 (H) <=0.0 10e3/uL    Absolute NRBCs 1.2 (H) <=0.0 10e3/uL    RBC Morphology Confirmed RBC Indices     Platelet Assessment   Automated Count Confirmed. Platelet morphology is normal.     Automated Count Confirmed. Platelet morphology is normal.    Sickle Cells Marked (A) None Seen   CBC with platelets differential     Status: Abnormal    Narrative    The following orders were created for panel order CBC with platelets differential.  Procedure                               Abnormality         Status                     ---------                               -----------         ------                     CBC with platelets and d...[[id number]]  Abnormal            Final result               Manual Differential[[id number]]          Abnormal            Final result                 Please view results for these tests on the individual orders.     Medications   HYDROmorphone (DILAUDID) injection 1 mg (1 mg Intravenous $Given 12/30/24 1754)   ketorolac (TORADOL) injection 30 mg (30 mg Intravenous $Given 12/30/24 1753)   lactated ringers BOLUS 1,000 mL (0 mLs Intravenous Stopped 12/30/24 2042)   HYDROmorphone (DILAUDID) injection 1 mg (1 mg Intravenous $Given 12/30/24 1916)   ondansetron (ZOFRAN) injection 4 mg (4 mg Intravenous $Given 12/30/24 1923)   HYDROmorphone (DILAUDID) injection 1 mg (1 mg Intravenous $Given 12/30/24 2031)     Labs Ordered and Resulted from Time of ED Arrival to Time of ED Departure   COMPREHENSIVE METABOLIC PANEL - Abnormal       Result Value    Sodium 138      Potassium 4.2      Carbon Dioxide (CO2) 22      Anion Gap 12      Urea Nitrogen 8.6      Creatinine 0.50 (*)     GFR Estimate >90      Calcium 9.2      Chloride 104      Glucose 83      Alkaline Phosphatase 67      AST 45      ALT 29      Protein Total 7.9      Albumin 4.8      Bilirubin Total 2.4 (*)    RETICULOCYTE COUNT - Abnormal    % Reticulocyte 25.6 (*)     Absolute Reticulocyte 0.620 (*)    CBC WITH PLATELETS AND DIFFERENTIAL - Abnormal    WBC Count 12.9 (*)     RBC Count 2.33 (*)     Hemoglobin 7.4 (*)     Hematocrit 20.6 (*)     MCV 88      MCH 31.8       MCHC 35.9      RDW 26.4 (*)     Platelet Count 473 (*)     % Neutrophils 67      % Lymphocytes 20      % Monocytes 8      % Eosinophils 3      % Basophils 2      % Immature Granulocytes 1      NRBCs per 100 WBC 6 (*)     Absolute Neutrophils 8.6 (*)     Absolute Lymphocytes 2.5      Absolute Monocytes 1.0      Absolute Eosinophils 0.4      Absolute Basophils 0.3 (*)     Absolute Immature Granulocytes 0.1      Absolute NRBCs 0.8     DIFFERENTIAL - Abnormal    % Neutrophils 66      % Lymphocytes 22      % Monocytes 5      % Eosinophils 4      % Basophils 2      % Metamyelocytes 1      NRBCs per 100 WBC 10 (*)     Absolute Neutrophils 8.5 (*)     Absolute Lymphocytes 2.8      Absolute Monocytes 0.7      Absolute Eosinophils 0.6      Absolute Basophils 0.2      Absolute Metamyelocytes 0.1 (*)     Absolute NRBCs 1.2 (*)     RBC Morphology Confirmed RBC Indices      Platelet Assessment        Value: Automated Count Confirmed. Platelet morphology is normal.    Sickle Cells Marked (*)    HCG QUALITATIVE PREGNANCY - Normal    hCG Serum Qualitative Negative     TROPONIN T, HIGH SENSITIVITY - Normal    Troponin T, High Sensitivity <6       XR Chest 2 Views   Final Result   IMPRESSION: No definite acute infiltrates.             Critical care was not performed.     Medical Decision Making  The patient's presentation was of high complexity (a chronic illness severe exacerbation, progression, or side effect of treatment).    The patient's evaluation involved:  review of external note(s) from 3+ sources (clinical)  review of 3+ test result(s) ordered prior to this encounter (see separate area of note for details)  ordering and/or review of 3+ test(s) in this encounter (see separate area of note for details)    The patient's management necessitated high risk (a parenteral controlled substance).    Assessment & Plan    23yo F pmhx sickle cell disease c/b CVA and acute chest who presents with sickle cell pain flare, endorsing pain in  her lumbar spine and right hip consistent with prior crises.  Also with chest pressure, but no SOB, cough, fever, chills, pleuritic nature.  ED patient is   HDS/AF, NAD, well-appearing.  SpO2 94%, however it appears the patient has had similar oxygen saturations on previous ED visits.  Suspicion at this time for PE is low given her lack of tachycardia, baseline SpO2, and nonpleuritic nature of her CP.  CXR was obtained to assess for acute chest and negative.  Labs show baseline anemia at 7.4, appears to be roughly baseline leukocytosis of 12.9.  CMP unremarkable.  Pregnancy negative.  Troponin negative. EKG Sinus.  Patient's care plan was followed with patient receiving 1L LR, 40 mg IV ketorolac and 1 mg Dilaudid x3 with improvement in patient's symptoms.  Patient was noted to have a slight drop in her SpO2 to the low 90s after receiving her third dose of Dilaudid, but she reports this is a relatively common occurrence for her which  chart review supports.  She was observed for 30+ minutes following injection, without respiratory depression or worsening of her SpO2.  At this time, patient reports pain improved to the degree that she would like to discharge home.  She will be discharged with strict ER return precautions and hematology follow-up.    I have reviewed the nursing notes. I have reviewed the findings, diagnosis, plan and need for follow up with the patient.    New Prescriptions    No medications on file       Final diagnoses:   Sickle cell disease with crisis (H)     Danyel Blackburn PA-C   [facility] EMERGENCY DEPARTMENT  12/30/2024        Danyel Blackburn PA-C  12/30/24 [id number]

## 2024-12-30 NOTE — TELEPHONE ENCOUNTER
Oncology Nurse Triage - Sickle Cell Pain Crisis:    Situation: Jennifer  calling about Sickle Cell Pain Crisis, requesting to be added on for IV fluids and pain medicine    Background:     Patient's last infusion was 12/26/24 in infusion and 12/27/24 in ED  Last clinic visit date: 12/19/24 w/ Patricia Mantilla  Does patient have active treatment plan? Yes    Assessment of Symptoms:  Onset/Duration of symptoms: 3 day    Is it typical sickle cell pain? Yes  Location: lower back and R hip  Character: Sharp  Intensity: 9/10    Any radiation of pain, numbness, tingling, weakness, warmth, swelling, discoloration of arms or legs?No    Fever? No    Chest Pain Present: No    Shortness of breath: No    Other home therapies tried: HEAT/HEATING PAD and WARM BATH     Last home medication taken and when: 12/29/24 around 10pm Oxycodone and IBU     Any Refills Needed?: No    Does patient have transportation & length of time to get to clinic: No    Recommendations:   [id number] vocera page to Dr. Thibodeaux, on call, for provider approval. Okayed by Dr. Debi Thibodeaux to come in if infusion can take her.       If you do not hear from the infusion center by 2pm then you will not be able to get in for an infusion today. If symptoms worsen while waiting for call back, and/or you experience fever, chills, SOB, chest pain, cough, n/v, dizziness, numbness, swelling, discoloration of extremities, then seek emergency evaluation in Emergency Department.     Please note, if you are late for your appt, you risk losing your infusion appt as it may delay another patient's infusion who arrived on time.

## 2024-12-30 NOTE — TELEPHONE ENCOUNTER
Pt calling to check on status of wait list. Informed pt no available appts currently. Pt verbalized understanding.

## 2024-12-31 RX ORDER — DIPHENHYDRAMINE HYDROCHLORIDE 50 MG/ML
25 INJECTION INTRAMUSCULAR; INTRAVENOUS
Start: 2025-01-02

## 2024-12-31 RX ORDER — MEPERIDINE HYDROCHLORIDE 25 MG/ML
25 INJECTION INTRAMUSCULAR; INTRAVENOUS; SUBCUTANEOUS
OUTPATIENT
Start: 2025-01-02

## 2024-12-31 RX ORDER — ALBUTEROL SULFATE 90 UG/1
1-2 INHALANT RESPIRATORY (INHALATION)
Start: 2025-01-02

## 2024-12-31 RX ORDER — EPINEPHRINE 1 MG/ML
0.3 INJECTION, SOLUTION INTRAMUSCULAR; SUBCUTANEOUS EVERY 5 MIN PRN
OUTPATIENT
Start: 2025-01-02

## 2024-12-31 RX ORDER — METHYLPREDNISOLONE SODIUM SUCCINATE 40 MG/ML
40 INJECTION INTRAMUSCULAR; INTRAVENOUS
Start: 2025-01-02

## 2024-12-31 RX ORDER — HEPARIN SODIUM (PORCINE) LOCK FLUSH IV SOLN 100 UNIT/ML 100 UNIT/ML
5 SOLUTION INTRAVENOUS
OUTPATIENT
Start: 2025-01-02

## 2024-12-31 RX ORDER — DIPHENHYDRAMINE HYDROCHLORIDE 50 MG/ML
50 INJECTION INTRAMUSCULAR; INTRAVENOUS
Start: 2025-01-02

## 2024-12-31 RX ORDER — HEPARIN SODIUM,PORCINE 10 UNIT/ML
5 VIAL (ML) INTRAVENOUS
OUTPATIENT
Start: 2025-01-02

## 2024-12-31 RX ORDER — ALBUTEROL SULFATE 0.83 MG/ML
2.5 SOLUTION RESPIRATORY (INHALATION)
OUTPATIENT
Start: 2025-01-02

## 2024-12-31 NOTE — ED NOTES
Pt given discharge paperwork and instructions. No questions or concerns at this time. VSS. A&O x4. Ambulatory with steady gait.

## 2025-01-02 ENCOUNTER — APPOINTMENT (OUTPATIENT)
Dept: LAB | Facility: CLINIC | Age: 25
End: 2025-01-02
Attending: PEDIATRICS
Payer: COMMERCIAL

## 2025-01-02 ENCOUNTER — NURSE TRIAGE (OUTPATIENT)
Dept: ONCOLOGY | Facility: CLINIC | Age: 25
End: 2025-01-02

## 2025-01-02 ENCOUNTER — INFUSION THERAPY VISIT (OUTPATIENT)
Dept: ONCOLOGY | Facility: CLINIC | Age: 25
End: 2025-01-02
Attending: PEDIATRICS
Payer: COMMERCIAL

## 2025-01-02 VITALS
RESPIRATION RATE: 16 BRPM | WEIGHT: 157.8 LBS | OXYGEN SATURATION: 93 % | BODY MASS INDEX: 27.09 KG/M2 | SYSTOLIC BLOOD PRESSURE: 129 MMHG | TEMPERATURE: 98.4 F | HEART RATE: 103 BPM | DIASTOLIC BLOOD PRESSURE: 84 MMHG

## 2025-01-02 DIAGNOSIS — D57.00 SICKLE CELL PAIN CRISIS (H): Primary | ICD-10-CM

## 2025-01-02 DIAGNOSIS — G81.10 SPASTIC HEMIPLEGIA, UNSPECIFIED ETIOLOGY, UNSPECIFIED LATERALITY (H): ICD-10-CM

## 2025-01-02 DIAGNOSIS — D57.00 SICKLE CELL DISEASE WITH CRISIS (H): ICD-10-CM

## 2025-01-02 LAB
ANION GAP SERPL CALCULATED.3IONS-SCNC: 11 MMOL/L (ref 7–15)
ATRIAL RATE - MUSE: 97 BPM
BASOPHILS # BLD AUTO: 0.2 10E3/UL (ref 0–0.2)
BASOPHILS NFR BLD AUTO: 2 %
BUN SERPL-MCNC: 14.4 MG/DL (ref 6–20)
CALCIUM SERPL-MCNC: 9 MG/DL (ref 8.8–10.4)
CHLORIDE SERPL-SCNC: 107 MMOL/L (ref 98–107)
CREAT SERPL-MCNC: 0.48 MG/DL (ref 0.51–0.95)
DIASTOLIC BLOOD PRESSURE - MUSE: NORMAL MMHG
EGFRCR SERPLBLD CKD-EPI 2021: >90 ML/MIN/1.73M2
EOSINOPHIL # BLD AUTO: 0.5 10E3/UL (ref 0–0.7)
EOSINOPHIL NFR BLD AUTO: 4 %
ERYTHROCYTE [DISTWIDTH] IN BLOOD BY AUTOMATED COUNT: 25 % (ref 10–15)
GLUCOSE SERPL-MCNC: 82 MG/DL (ref 70–99)
HCO3 SERPL-SCNC: 21 MMOL/L (ref 22–29)
HCT VFR BLD AUTO: 19.8 % (ref 35–47)
HGB BLD-MCNC: 7 G/DL (ref 11.7–15.7)
IMM GRANULOCYTES # BLD: 0.1 10E3/UL
IMM GRANULOCYTES NFR BLD: 1 %
INTERPRETATION ECG - MUSE: NORMAL
LYMPHOCYTES # BLD AUTO: 2.3 10E3/UL (ref 0.8–5.3)
LYMPHOCYTES NFR BLD AUTO: 17 %
MCH RBC QN AUTO: 31.1 PG (ref 26.5–33)
MCHC RBC AUTO-ENTMCNC: 35.4 G/DL (ref 31.5–36.5)
MCV RBC AUTO: 88 FL (ref 78–100)
MONOCYTES # BLD AUTO: 1.2 10E3/UL (ref 0–1.3)
MONOCYTES NFR BLD AUTO: 9 %
NEUTROPHILS # BLD AUTO: 9.1 10E3/UL (ref 1.6–8.3)
NEUTROPHILS NFR BLD AUTO: 68 %
NRBC # BLD AUTO: 0.6 10E3/UL
NRBC BLD AUTO-RTO: 4 /100
P AXIS - MUSE: 60 DEGREES
PLATELET # BLD AUTO: 401 10E3/UL (ref 150–450)
POTASSIUM SERPL-SCNC: 4.1 MMOL/L (ref 3.4–5.3)
PR INTERVAL - MUSE: 150 MS
QRS DURATION - MUSE: 76 MS
QT - MUSE: 370 MS
QTC - MUSE: 469 MS
R AXIS - MUSE: 37 DEGREES
RBC # BLD AUTO: 2.25 10E6/UL (ref 3.8–5.2)
RETICS # AUTO: 0.68 10E6/UL (ref 0.03–0.1)
RETICS/RBC NFR AUTO: 30 % (ref 0.5–2)
SODIUM SERPL-SCNC: 139 MMOL/L (ref 135–145)
SYSTOLIC BLOOD PRESSURE - MUSE: NORMAL MMHG
T AXIS - MUSE: 23 DEGREES
VENTRICULAR RATE- MUSE: 97 BPM
WBC # BLD AUTO: 13.5 10E3/UL (ref 4–11)

## 2025-01-02 PROCEDURE — 85048 AUTOMATED LEUKOCYTE COUNT: CPT | Performed by: PEDIATRICS

## 2025-01-02 PROCEDURE — 36591 DRAW BLOOD OFF VENOUS DEVICE: CPT | Performed by: PEDIATRICS

## 2025-01-02 PROCEDURE — 250N000011 HC RX IP 250 OP 636: Performed by: PEDIATRICS

## 2025-01-02 PROCEDURE — 250N000013 HC RX MED GY IP 250 OP 250 PS 637: Performed by: PEDIATRICS

## 2025-01-02 PROCEDURE — 258N000003 HC RX IP 258 OP 636: Performed by: PEDIATRICS

## 2025-01-02 PROCEDURE — 85045 AUTOMATED RETICULOCYTE COUNT: CPT | Performed by: PEDIATRICS

## 2025-01-02 PROCEDURE — 85004 AUTOMATED DIFF WBC COUNT: CPT | Performed by: PEDIATRICS

## 2025-01-02 PROCEDURE — 80048 BASIC METABOLIC PNL TOTAL CA: CPT | Performed by: PEDIATRICS

## 2025-01-02 RX ORDER — HEPARIN SODIUM (PORCINE) LOCK FLUSH IV SOLN 100 UNIT/ML 100 UNIT/ML
5 SOLUTION INTRAVENOUS
Status: DISCONTINUED | OUTPATIENT
Start: 2025-01-02 | End: 2025-01-02 | Stop reason: HOSPADM

## 2025-01-02 RX ORDER — DIPHENHYDRAMINE HCL 25 MG
50 CAPSULE ORAL
Status: COMPLETED | OUTPATIENT
Start: 2025-01-02 | End: 2025-01-02

## 2025-01-02 RX ORDER — ONDANSETRON 4 MG/1
8 TABLET, FILM COATED ORAL
Start: 2025-04-01

## 2025-01-02 RX ORDER — ONDANSETRON 4 MG/1
8 TABLET, FILM COATED ORAL
Status: DISCONTINUED | OUTPATIENT
Start: 2025-01-02 | End: 2025-01-02

## 2025-01-02 RX ORDER — HEPARIN SODIUM (PORCINE) LOCK FLUSH IV SOLN 100 UNIT/ML 100 UNIT/ML
5 SOLUTION INTRAVENOUS
OUTPATIENT
Start: 2025-04-01

## 2025-01-02 RX ORDER — ONDANSETRON 2 MG/ML
8 INJECTION INTRAMUSCULAR; INTRAVENOUS EVERY 6 HOURS PRN
Start: 2025-04-01

## 2025-01-02 RX ORDER — ONDANSETRON 4 MG/1
8 TABLET, ORALLY DISINTEGRATING ORAL
Status: COMPLETED | OUTPATIENT
Start: 2025-01-02 | End: 2025-01-02

## 2025-01-02 RX ORDER — KETOROLAC TROMETHAMINE 30 MG/ML
30 INJECTION, SOLUTION INTRAMUSCULAR; INTRAVENOUS ONCE
Status: COMPLETED | OUTPATIENT
Start: 2025-01-02 | End: 2025-01-02

## 2025-01-02 RX ORDER — KETOROLAC TROMETHAMINE 30 MG/ML
30 INJECTION, SOLUTION INTRAMUSCULAR; INTRAVENOUS ONCE
Start: 2025-04-01 | End: 2025-04-01

## 2025-01-02 RX ORDER — DIPHENHYDRAMINE HCL 25 MG
50 CAPSULE ORAL
Start: 2025-04-01

## 2025-01-02 RX ORDER — HEPARIN SODIUM (PORCINE) LOCK FLUSH IV SOLN 100 UNIT/ML 100 UNIT/ML
5 SOLUTION INTRAVENOUS ONCE
Status: COMPLETED | OUTPATIENT
Start: 2025-01-02 | End: 2025-01-02

## 2025-01-02 RX ORDER — HEPARIN SODIUM,PORCINE 10 UNIT/ML
5 VIAL (ML) INTRAVENOUS
OUTPATIENT
Start: 2025-04-01

## 2025-01-02 RX ORDER — OXYCODONE HYDROCHLORIDE 10 MG/1
10 TABLET ORAL EVERY 6 HOURS PRN
Qty: 12 TABLET | Refills: 0 | Status: SHIPPED | OUTPATIENT
Start: 2025-01-02

## 2025-01-02 RX ADMIN — ONDANSETRON 8 MG: 4 TABLET, ORALLY DISINTEGRATING ORAL at 10:54

## 2025-01-02 RX ADMIN — HYDROMORPHONE HYDROCHLORIDE 1 MG: 1 INJECTION, SOLUTION INTRAMUSCULAR; INTRAVENOUS; SUBCUTANEOUS at 11:53

## 2025-01-02 RX ADMIN — DIPHENHYDRAMINE HYDROCHLORIDE 50 MG: 25 CAPSULE ORAL at 10:46

## 2025-01-02 RX ADMIN — HEPARIN 5 ML: 100 SYRINGE at 10:12

## 2025-01-02 RX ADMIN — KETOROLAC TROMETHAMINE 30 MG: 30 INJECTION, SOLUTION INTRAMUSCULAR; INTRAVENOUS at 10:46

## 2025-01-02 RX ADMIN — SODIUM CHLORIDE 50 ML: 9 INJECTION, SOLUTION INTRAVENOUS at 11:58

## 2025-01-02 RX ADMIN — SODIUM CHLORIDE, POTASSIUM CHLORIDE, SODIUM LACTATE AND CALCIUM CHLORIDE 1000 ML: 600; 310; 30; 20 INJECTION, SOLUTION INTRAVENOUS at 10:47

## 2025-01-02 RX ADMIN — HYDROMORPHONE HYDROCHLORIDE 1 MG: 1 INJECTION, SOLUTION INTRAMUSCULAR; INTRAVENOUS; SUBCUTANEOUS at 10:47

## 2025-01-02 RX ADMIN — SODIUM CHLORIDE 358 MG: 9 INJECTION, SOLUTION INTRAVENOUS at 11:57

## 2025-01-02 RX ADMIN — HEPARIN 5 ML: 100 SYRINGE at 14:12

## 2025-01-02 RX ADMIN — HYDROMORPHONE HYDROCHLORIDE 1 MG: 1 INJECTION, SOLUTION INTRAMUSCULAR; INTRAVENOUS; SUBCUTANEOUS at 13:13

## 2025-01-02 ASSESSMENT — PAIN SCALES - GENERAL: PAINLEVEL_OUTOF10: WORST PAIN (10)

## 2025-01-02 NOTE — NURSING NOTE
Chief Complaint   Patient presents with    Infusion     Crizanlizumab-tmca.    Port Draw     Labs drawn via port access by lab RN       Upon arrival in lab pt requested this RN cover port site with gauze and paper tape only after access. Offered pt multi other drs choices and educated pt on risk for infection and needle dislodging because she is waiting in lobby then walking to infusion. Manager Cherelle SCHROEDER advised I should message provider and advise Infusion RN. Pt on the phone with her mom and stated RN could use IV clear dressing.   Port blood draw with heparin flush by lab RN. Vitals taken and appointment arrived.    Acacia Mohr, RN

## 2025-01-02 NOTE — TELEPHONE ENCOUNTER
Oncology Nurse Triage - Sickle Cell Pain Crisis:  Situation: Jennifer  calling about Sickle Cell Pain Crisis, requesting to be added on for IV fluids and pain medicine to [facility] apt at 10 a.m.    Background:   Patient's last infusion was 12/30/24  Last clinic visit date:12/19/24 with Patricia Mantilla CNP  Does patient have active treatment plan? Yes    Assessment of Symptoms:  Onset/Duration of symptoms: 2 day    Is it typical sickle cell pain? Yes  Location: all over  Character: Sharp  Intensity: 8/10    Any radiation of pain, numbness, tingling, weakness, warmth, swelling, discoloration of arms or legs?No    Fever? No  Chest Pain Present: No  Shortness of breath: No    Other home therapies tried: HEAT/HEATING PAD and WARM BATH     Last home medication taken and when: oxycodone and ibuprofen    Any Refills Needed?: Yes, oxycodone, 10 mg tablet. Pended up in separate refill encounter    Does patient have transportation & length of time to get to clinic: Yes      Recommendations:   If you do not hear from the infusion center by 2pm then you will not be able to get in for an infusion today. If symptoms worsen while waiting for call back, and/or you experience fever, chills, SOB, chest pain, cough, n/v, dizziness, numbness, swelling, discoloration of extremities, then seek emergency evaluation in Emergency Department.     Pt voiced understanding.  0710: Secure chat sent to Patricia Mantilla CNP. Okay to add on IVF/pm to [facility] today.  Added to infusion wait list.

## 2025-01-02 NOTE — TELEPHONE ENCOUNTER
Call from pt, wondering status of if IVF/pain meds can be added to Jr appt today.   Call to [facility], spoke w/ charge nurse Dee Dee, who states  insurance has authorized Jr, but IVF/pain meds have not been authorized by insurance. They are actively working on this. Dee Dee suggested pt come as planned for 10am Jr and they will continue to monitor if IVF/pain meds gets authorized prior to appt. Dee Dee said they can add it, but insurance needs to authorize it.  Pt updated.

## 2025-01-02 NOTE — PROGRESS NOTES
Infusion Nursing Note:  Jennifer Cervantes presents today for Crizanlizumab-tmca, IVFs and pain meds.    Patient seen by provider today: No   present during visit today: Not Applicable.    Note: Patient presents to the infusion center today in 10/10 generalized pain. States she last took her PO pain meds at 6AM this morning. No other new symptoms or concerns. No fevers, chills, chest pain or shortness of breath.    After three doses of IV Dilaudid and one dose IV Toradol patient's pain was tolerable to be discharged home. Patient has a     Intravenous Access:  Implanted Port.    Treatment Conditions:   Latest Reference Range & Units 01/02/25 10:10   Sodium 135 - 145 mmol/L 139   Potassium 3.4 - 5.3 mmol/L 4.1   Chloride 98 - 107 mmol/L 107   Carbon Dioxide (CO2) 22 - 29 mmol/L 21 (L)   Urea Nitrogen 6.0 - 20.0 mg/dL 14.4   Creatinine 0.51 - 0.95 mg/dL 0.48 (L)   GFR Estimate >60 mL/min/1.73m2 >90   Calcium 8.8 - 10.4 mg/dL 9.0   Anion Gap 7 - 15 mmol/L 11   Glucose 70 - 99 mg/dL 82   WBC 4.0 - 11.0 10e3/uL 13.5 (H)   Hemoglobin 11.7 - 15.7 g/dL 7.0 (L)   Hematocrit 35.0 - 47.0 % 19.8 (L)   Platelet Count 150 - 450 10e3/uL 401   RBC Count 3.80 - 5.20 10e6/uL 2.25 (L)   MCV 78 - 100 fL 88   MCH 26.5 - 33.0 pg 31.1   MCHC 31.5 - 36.5 g/dL 35.4   RDW 10.0 - 15.0 % 25.0 (H)   % Neutrophils % 68   % Lymphocytes % 17   % Monocytes % 9   % Eosinophils % 4   % Basophils % 2   Absolute Basophils 0.0 - 0.2 10e3/uL 0.2   Absolute Eosinophils 0.0 - 0.7 10e3/uL 0.5   Absolute Immature Granulocytes <=0.4 10e3/uL 0.1   Absolute Lymphocytes 0.8 - 5.3 10e3/uL 2.3   Absolute Monocytes 0.0 - 1.3 10e3/uL 1.2   % Immature Granulocytes % 1   Absolute Neutrophils 1.6 - 8.3 10e3/uL 9.1 (H)   Absolute NRBCs 10e3/uL 0.6   NRBCs per 100 WBC <1 /100 4 (H)     ~~~ NOTE: If the patient answers yes to any of the questions below, hold the infusion and contact ordering provider or on-call provider.    Do you currently have any signs of illness  or infection or are you on any antibiotics? No  Have you recently had an elevated temperature, fever, chills, productive cough, coughing for 3 weeks or longer or hemoptysis, abnormal vital signs, night sweats, chest pain or have you noticed a decrease in your appetite, unexplained weight loss or fatigue? No  Have you had any new, sudden, or worsening abdominal pain? No  Do you have any open wounds or new incisions? (exclude for patients with hidradenitis suppurativa) No  Have you recently been diagnosed with any new nervous system diseases (ie. Multiple sclerosis, Guillain [place], seizures, neurological changes) or cancer diagnosis? Are you on any form of radiation or chemotherapy? No  Have there been any other new onset medical symptoms? No  Are you pregnant or breast feeding or do you have plans of pregnancy in the future? No; N/A  Do you have any upcoming hospitalizations or surgeries? Does not include esophagogastroduodenoscopy, colonoscopy, endoscopic retrograde cholangiopancreatography (ERCP), endoscopic ultrasound (EUS), dental procedures (including cleanings, fillings, implants, extractions)  or joint aspiration/steroid injections No  Have you or anyone in your household received a live vaccination in the past 4 weeks? Please note: No live vaccines while on biologic/chemotherapy until 6 months after the last treatment. Patient can receive the flu vaccine (shot only).  It is optimal for the patient to get it mid cycle, but it can be given at any time as long as it is not on the day of the infusion. No  Results reviewed, labs MET treatment parameters, ok to proceed with treatment.    Post Infusion Assessment:  Patient tolerated infusion without incident.  Blood return noted pre and post infusion.  Patient observed for 30mins post Jr per protocol.  No evidence of extravasations.  Access discontinued per protocol.     Discharge Plan:   Patient given prescription refills for Oxycodone.  Discharge instructions  reviewed with: Patient.  Patient and/or family verbalized understanding of discharge instructions and all questions answered.  AVS to patient via [employer].  Patient will return 1/30 for next appointment.   Patient discharged in stable condition accompanied by: self.  Departure Mode: Ambulatory.      Nichole Vasquez RN

## 2025-01-02 NOTE — PATIENT INSTRUCTIONS
[facility] Triage and after hours / weekends / holidays:  [phone]    Please call the [employer] Triage line if you experience a temperature greater than or equal to 100.4, shaking chills, have uncontrolled nausea, vomiting and/or diarrhea, dizziness, shortness of breath, chest pain, bleeding, unexplained bruising, or if you have any other new/concerning symptoms, questions or concerns.     If you wish to speak to a provider before your next infusion visit, please call your care coordinator to notify them so we can adequately serve you.    If you need a refill on a narcotic prescription or other medication, please call before your infusion appointment.      EDUCATION POST BIOLOGICAL/CHEMOTHERAPY INFUSION  Call the triage nurse at your clinic or seek medical attention if you have chills and/or temperature greater than or equal to 100.5, uncontrolled nausea/vomiting, diarrhea, constipation, dizziness, shortness of breath, chest pain, heart palpitations, weakness or any other new or concerning symptoms, questions or concerns.  You can not have any live virus vaccines prior to or during treatment or up to 6 months post infusion.  If you have an upcoming surgery, medical procedure or dental procedure during treatment, this should be discussed with your ordering physician and your surgeon/dentist.  If you are having any concerning symptom, if you are unsure if you should get your next infusion or wish to speak to a provider before your next infusion, please call your care coordinator or triage nurse at your clinic to notify them so we can adequately serve you.

## 2025-01-02 NOTE — TELEPHONE ENCOUNTER
Narcotic Refill Request  Person Requesting Refill:Jennifer    Medication(s) requested:  oxycodone 10 mg tablets  Last fill date and by whom:  12/26/24 by Patricia Mantilla CNP  What pain is the medication treating: sickle cell pain  How is the medication being taken?:taking two tablets per day  Does pt have enough for today? No  Is pain being adequately controlled on the current regimen?: Yes  Experiencing any side effects from medication?: No    Date of most recent appointment:  12/19/24 with Patricia Mantilla CNP  Any No Show Visits:No  Next appointment:   1/13/25 with Patricia Mantilla CNP     Reviewed: No access    Routed/Paged provider: Patricia Mantilla CNP and MAURISIO Asencio

## 2025-01-06 ENCOUNTER — INFUSION THERAPY VISIT (OUTPATIENT)
Dept: INFUSION THERAPY | Facility: CLINIC | Age: 25
End: 2025-01-06
Attending: PEDIATRICS
Payer: COMMERCIAL

## 2025-01-06 ENCOUNTER — PATIENT OUTREACH (OUTPATIENT)
Dept: CARE COORDINATION | Facility: CLINIC | Age: 25
End: 2025-01-06
Payer: COMMERCIAL

## 2025-01-06 ENCOUNTER — NURSE TRIAGE (OUTPATIENT)
Dept: ONCOLOGY | Facility: CLINIC | Age: 25
End: 2025-01-06
Payer: COMMERCIAL

## 2025-01-06 VITALS
HEART RATE: 104 BPM | TEMPERATURE: 98.4 F | DIASTOLIC BLOOD PRESSURE: 78 MMHG | OXYGEN SATURATION: 97 % | RESPIRATION RATE: 16 BRPM | SYSTOLIC BLOOD PRESSURE: 122 MMHG

## 2025-01-06 DIAGNOSIS — D57.00 SICKLE CELL PAIN CRISIS (H): Primary | ICD-10-CM

## 2025-01-06 DIAGNOSIS — G81.10 SPASTIC HEMIPLEGIA, UNSPECIFIED ETIOLOGY, UNSPECIFIED LATERALITY (H): ICD-10-CM

## 2025-01-06 PROCEDURE — 96376 TX/PRO/DX INJ SAME DRUG ADON: CPT

## 2025-01-06 PROCEDURE — 250N000011 HC RX IP 250 OP 636: Performed by: PEDIATRICS

## 2025-01-06 PROCEDURE — 96375 TX/PRO/DX INJ NEW DRUG ADDON: CPT

## 2025-01-06 PROCEDURE — 258N000003 HC RX IP 258 OP 636: Performed by: PEDIATRICS

## 2025-01-06 PROCEDURE — 96361 HYDRATE IV INFUSION ADD-ON: CPT

## 2025-01-06 PROCEDURE — 96374 THER/PROPH/DIAG INJ IV PUSH: CPT

## 2025-01-06 PROCEDURE — 250N000013 HC RX MED GY IP 250 OP 250 PS 637: Performed by: PEDIATRICS

## 2025-01-06 RX ORDER — ONDANSETRON 2 MG/ML
8 INJECTION INTRAMUSCULAR; INTRAVENOUS EVERY 6 HOURS PRN
Status: DISCONTINUED | OUTPATIENT
Start: 2025-01-06 | End: 2025-01-06 | Stop reason: HOSPADM

## 2025-01-06 RX ORDER — DIPHENHYDRAMINE HCL 25 MG
50 CAPSULE ORAL
Status: COMPLETED | OUTPATIENT
Start: 2025-01-06 | End: 2025-01-06

## 2025-01-06 RX ORDER — DIPHENHYDRAMINE HCL 25 MG
50 CAPSULE ORAL
Status: CANCELLED
Start: 2025-04-01

## 2025-01-06 RX ORDER — HEPARIN SODIUM,PORCINE 10 UNIT/ML
5 VIAL (ML) INTRAVENOUS
Status: CANCELLED | OUTPATIENT
Start: 2025-04-01

## 2025-01-06 RX ORDER — ONDANSETRON 4 MG/1
8 TABLET, FILM COATED ORAL
Status: CANCELLED
Start: 2025-04-01

## 2025-01-06 RX ORDER — KETOROLAC TROMETHAMINE 30 MG/ML
30 INJECTION, SOLUTION INTRAMUSCULAR; INTRAVENOUS ONCE
Status: COMPLETED | OUTPATIENT
Start: 2025-01-06 | End: 2025-01-06

## 2025-01-06 RX ORDER — HEPARIN SODIUM (PORCINE) LOCK FLUSH IV SOLN 100 UNIT/ML 100 UNIT/ML
5 SOLUTION INTRAVENOUS
Status: CANCELLED | OUTPATIENT
Start: 2025-04-01

## 2025-01-06 RX ORDER — KETOROLAC TROMETHAMINE 30 MG/ML
30 INJECTION, SOLUTION INTRAMUSCULAR; INTRAVENOUS ONCE
Status: CANCELLED
Start: 2025-04-01 | End: 2025-04-01

## 2025-01-06 RX ORDER — ONDANSETRON 2 MG/ML
8 INJECTION INTRAMUSCULAR; INTRAVENOUS EVERY 6 HOURS PRN
Status: CANCELLED
Start: 2025-04-01

## 2025-01-06 RX ORDER — HEPARIN SODIUM (PORCINE) LOCK FLUSH IV SOLN 100 UNIT/ML 100 UNIT/ML
5 SOLUTION INTRAVENOUS
Status: DISCONTINUED | OUTPATIENT
Start: 2025-01-06 | End: 2025-01-06 | Stop reason: HOSPADM

## 2025-01-06 RX ADMIN — HEPARIN 3 ML: 100 SYRINGE at 11:50

## 2025-01-06 RX ADMIN — HYDROMORPHONE HYDROCHLORIDE 1 MG: 1 INJECTION, SOLUTION INTRAMUSCULAR; INTRAVENOUS; SUBCUTANEOUS at 11:26

## 2025-01-06 RX ADMIN — KETOROLAC TROMETHAMINE 30 MG: 30 INJECTION, SOLUTION INTRAMUSCULAR; INTRAVENOUS at 09:28

## 2025-01-06 RX ADMIN — DIPHENHYDRAMINE HYDROCHLORIDE 50 MG: 25 CAPSULE ORAL at 09:24

## 2025-01-06 RX ADMIN — ONDANSETRON 8 MG: 2 INJECTION INTRAMUSCULAR; INTRAVENOUS at 09:24

## 2025-01-06 RX ADMIN — HYDROMORPHONE HYDROCHLORIDE 1 MG: 1 INJECTION, SOLUTION INTRAMUSCULAR; INTRAVENOUS; SUBCUTANEOUS at 09:24

## 2025-01-06 RX ADMIN — SODIUM CHLORIDE, POTASSIUM CHLORIDE, SODIUM LACTATE AND CALCIUM CHLORIDE 1000 ML: 600; 310; 30; 20 INJECTION, SOLUTION INTRAVENOUS at 09:21

## 2025-01-06 RX ADMIN — HYDROMORPHONE HYDROCHLORIDE 1 MG: 1 INJECTION, SOLUTION INTRAMUSCULAR; INTRAVENOUS; SUBCUTANEOUS at 10:23

## 2025-01-06 NOTE — PROGRESS NOTES
Ambulatory Care Management- Transportation Support  Specialty SW - [facility]     Data/Intervention:  Patient Name:    Jennifer Cervantes DOB/Age: 2000 (24 year old)     [facility] team member assisting Specialty SW with transportation request.      Assessment:  CHW/CTA called [employer] to arrange medical appointment transportation in conjunction with patient's insurance.     Taxi company: [employer]    Scheduled  at 12:00 pm from clinic for return ride home.     Plan:  Patient is aware of the transportation plan.  available to assist with any other needs.      Maritza Vick MA

## 2025-01-06 NOTE — PATIENT INSTRUCTIONS
Dear Jennifer Cervantes    Thank you for choosing [facility] ([facility]) for your infusion.  The following information is a summary of our appointment as well as important reminders.      We look forward in seeing you on your next appointment here at [facility] ([facility]).  Please don t hesitate to call us at [phone] to reschedule any of your appointments or to speak with one of the [facility] registered nurses.  It was a pleasure taking care of you today.    Sincerely,    [facility]  [address] [place] [address]  [id number]  Phone:  [phone]

## 2025-01-06 NOTE — TELEPHONE ENCOUNTER
Call from pt, who is in infusion, requesting message be sent to SW to help assist in transportation home after infusion and requesting they call her to verify.   2320 message sent to  team with request.

## 2025-01-06 NOTE — PROGRESS NOTES
Infusion Nursing Note:  Jennifer Cervantes presents today for IV fluids, pain medication, antiemetics.    Patient seen by provider today: No   present during visit today: Not Applicable.    Note:   1L LR over 2 hours.  IV Dilaudid x3.  IV Toradol.  IV Zofran.  PO Benadryl.    Intravenous Access:  Implanted Port.    Treatment Conditions:  Not Applicable.    Post Infusion Assessment:  Patient tolerated infusion without incident.  Blood return noted pre and post infusion.  Site patent and intact, free from redness, edema or discomfort.  No evidence of extravasations.  Access discontinued per protocol.     Discharge Plan:   Patient and/or family verbalized understanding of discharge instructions and all questions answered.  AVS to patient via MYCHART.    Patient discharged in stable condition accompanied by: self.  Departure Mode: Ambulatory.    Administrations This Visit       diphenhydrAMINE (BENADRYL) capsule 50 mg       Admin Date  01/06/2025 Action  $Given Dose  50 mg Route  Oral Documented By  Echo Perez RN              heparin lock flush 100 unit/mL injection 5 mL       Admin Date  01/06/2025 Action  $Given Dose  3 mL Route  Intracatheter Documented By  Echo Perez RN              HYDROmorphone (DILAUDID) injection 1 mg       Admin Date  01/06/2025 Action  $Given Dose  1 mg Route  Intravenous Documented By  Echo Perez RN               Admin Date  01/06/2025 Action  $Given Dose  1 mg Route  Intravenous Documented By  Echo Perez RN               Admin Date  01/06/2025 Action  $Given Dose  1 mg Route  Intravenous Documented By  Echo Perez RN              ketorolac (TORADOL) injection 30 mg       Admin Date  01/06/2025 Action  $Given Dose  30 mg Route  Intravenous Documented By  Echo Perez RN              lactated ringers BOLUS 1,000 mL       Admin Date  01/06/2025 Action  $New Bag Dose  1,000 mL Rate  500 mL/hr Route  Intravenous Documented By  Echo Perez  RN              ondansetron (ZOFRAN) injection 8 mg       Admin Date  01/06/2025 Action  $Given Dose  8 mg Route  Intravenous Documented By  Echo Perez RN                  /74   Pulse 103   Temp 98.4  F (36.9  C) (Oral)   Resp 18   LMP  (LMP Unknown)   SpO2 95%     Echo Perez, JOE

## 2025-01-06 NOTE — TELEPHONE ENCOUNTER
Available appt with [facility] at 0900. Call to pt to verify she is able to make appt. Pt confirmed she can make appt and has own transportation. Message sent to [facility] to have pt added to schedule.

## 2025-01-08 ENCOUNTER — NURSE TRIAGE (OUTPATIENT)
Dept: ONCOLOGY | Facility: CLINIC | Age: 25
End: 2025-01-08

## 2025-01-08 ENCOUNTER — INFUSION THERAPY VISIT (OUTPATIENT)
Dept: INFUSION THERAPY | Facility: CLINIC | Age: 25
End: 2025-01-08
Attending: PEDIATRICS
Payer: COMMERCIAL

## 2025-01-08 ENCOUNTER — NURSE TRIAGE (OUTPATIENT)
Dept: ONCOLOGY | Facility: CLINIC | Age: 25
End: 2025-01-08
Payer: COMMERCIAL

## 2025-01-08 ENCOUNTER — LAB (OUTPATIENT)
Dept: LAB | Facility: CLINIC | Age: 25
End: 2025-01-08
Payer: COMMERCIAL

## 2025-01-08 ENCOUNTER — PATIENT OUTREACH (OUTPATIENT)
Dept: CARE COORDINATION | Facility: CLINIC | Age: 25
End: 2025-01-08

## 2025-01-08 VITALS — HEART RATE: 113 BPM | SYSTOLIC BLOOD PRESSURE: 130 MMHG | DIASTOLIC BLOOD PRESSURE: 69 MMHG | OXYGEN SATURATION: 94 %

## 2025-01-08 DIAGNOSIS — D64.9 LOW HEMOGLOBIN: Primary | ICD-10-CM

## 2025-01-08 DIAGNOSIS — D57.00 SICKLE CELL PAIN CRISIS (H): Primary | ICD-10-CM

## 2025-01-08 DIAGNOSIS — D57.00 SICKLE CELL DISEASE WITH CRISIS (H): Primary | ICD-10-CM

## 2025-01-08 DIAGNOSIS — D57.00 SICKLE CELL DISEASE WITH CRISIS (H): ICD-10-CM

## 2025-01-08 DIAGNOSIS — G81.10 SPASTIC HEMIPLEGIA, UNSPECIFIED ETIOLOGY, UNSPECIFIED LATERALITY (H): ICD-10-CM

## 2025-01-08 LAB
ALBUMIN SERPL BCG-MCNC: 4.6 G/DL (ref 3.5–5.2)
ALP SERPL-CCNC: 74 U/L (ref 40–150)
ALT SERPL W P-5'-P-CCNC: 32 U/L (ref 0–50)
ANION GAP SERPL CALCULATED.3IONS-SCNC: 14 MMOL/L (ref 7–15)
AST SERPL W P-5'-P-CCNC: 45 U/L (ref 0–45)
BASOPHILS # BLD AUTO: 0.1 10E3/UL (ref 0–0.2)
BASOPHILS NFR BLD AUTO: 1 %
BILIRUB SERPL-MCNC: 2.6 MG/DL
BUN SERPL-MCNC: 9.1 MG/DL (ref 6–20)
CALCIUM SERPL-MCNC: 9.1 MG/DL (ref 8.8–10.4)
CHLORIDE SERPL-SCNC: 105 MMOL/L (ref 98–107)
CREAT SERPL-MCNC: 0.5 MG/DL (ref 0.51–0.95)
EGFRCR SERPLBLD CKD-EPI 2021: >90 ML/MIN/1.73M2
EOSINOPHIL # BLD AUTO: 0.3 10E3/UL (ref 0–0.7)
EOSINOPHIL NFR BLD AUTO: 1 %
ERYTHROCYTE [DISTWIDTH] IN BLOOD BY AUTOMATED COUNT: 21.9 % (ref 10–15)
GLUCOSE SERPL-MCNC: 109 MG/DL (ref 70–99)
HCO3 SERPL-SCNC: 21 MMOL/L (ref 22–29)
HCT VFR BLD AUTO: 19.9 % (ref 35–47)
HGB BLD-MCNC: 7 G/DL (ref 11.7–15.7)
IMM GRANULOCYTES # BLD: 0.1 10E3/UL
IMM GRANULOCYTES NFR BLD: 0 %
LYMPHOCYTES # BLD AUTO: 2.2 10E3/UL (ref 0.8–5.3)
LYMPHOCYTES NFR BLD AUTO: 11 %
MCH RBC QN AUTO: 30 PG (ref 26.5–33)
MCHC RBC AUTO-ENTMCNC: 35.2 G/DL (ref 31.5–36.5)
MCV RBC AUTO: 85 FL (ref 78–100)
MONOCYTES # BLD AUTO: 1.3 10E3/UL (ref 0–1.3)
MONOCYTES NFR BLD AUTO: 6 %
NEUTROPHILS # BLD AUTO: 16 10E3/UL (ref 1.6–8.3)
NEUTROPHILS NFR BLD AUTO: 80 %
NRBC # BLD AUTO: 0.3 10E3/UL
NRBC BLD AUTO-RTO: 1 /100
PLATELET # BLD AUTO: 448 10E3/UL (ref 150–450)
POTASSIUM SERPL-SCNC: 4.1 MMOL/L (ref 3.4–5.3)
PROT SERPL-MCNC: 8.2 G/DL (ref 6.4–8.3)
RBC # BLD AUTO: 2.33 10E6/UL (ref 3.8–5.2)
RETICS # AUTO: 0.38 10E6/UL (ref 0.03–0.1)
RETICS/RBC NFR AUTO: 16.9 % (ref 0.5–2)
SODIUM SERPL-SCNC: 140 MMOL/L (ref 135–145)
WBC # BLD AUTO: 20 10E3/UL (ref 4–11)

## 2025-01-08 PROCEDURE — 36415 COLL VENOUS BLD VENIPUNCTURE: CPT

## 2025-01-08 PROCEDURE — 96361 HYDRATE IV INFUSION ADD-ON: CPT

## 2025-01-08 PROCEDURE — 85025 COMPLETE CBC W/AUTO DIFF WBC: CPT

## 2025-01-08 PROCEDURE — 96374 THER/PROPH/DIAG INJ IV PUSH: CPT

## 2025-01-08 PROCEDURE — 96376 TX/PRO/DX INJ SAME DRUG ADON: CPT

## 2025-01-08 PROCEDURE — 85045 AUTOMATED RETICULOCYTE COUNT: CPT

## 2025-01-08 PROCEDURE — 258N000003 HC RX IP 258 OP 636: Performed by: PEDIATRICS

## 2025-01-08 PROCEDURE — 96375 TX/PRO/DX INJ NEW DRUG ADDON: CPT

## 2025-01-08 PROCEDURE — 250N000011 HC RX IP 250 OP 636: Performed by: PEDIATRICS

## 2025-01-08 PROCEDURE — 250N000013 HC RX MED GY IP 250 OP 250 PS 637: Performed by: PEDIATRICS

## 2025-01-08 PROCEDURE — 80053 COMPREHEN METABOLIC PANEL: CPT

## 2025-01-08 RX ORDER — ONDANSETRON 2 MG/ML
8 INJECTION INTRAMUSCULAR; INTRAVENOUS EVERY 6 HOURS PRN
Start: 2025-04-01

## 2025-01-08 RX ORDER — ONDANSETRON 8 MG/1
8 TABLET, FILM COATED ORAL
Start: 2025-04-01

## 2025-01-08 RX ORDER — HEPARIN SODIUM,PORCINE 10 UNIT/ML
5 VIAL (ML) INTRAVENOUS
Status: DISCONTINUED | OUTPATIENT
Start: 2025-01-08 | End: 2025-01-08 | Stop reason: HOSPADM

## 2025-01-08 RX ORDER — HEPARIN SODIUM (PORCINE) LOCK FLUSH IV SOLN 100 UNIT/ML 100 UNIT/ML
5 SOLUTION INTRAVENOUS
OUTPATIENT
Start: 2025-04-01

## 2025-01-08 RX ORDER — DIPHENHYDRAMINE HCL 25 MG
50 CAPSULE ORAL
Start: 2025-04-01

## 2025-01-08 RX ORDER — HEPARIN SODIUM,PORCINE 10 UNIT/ML
5 VIAL (ML) INTRAVENOUS
OUTPATIENT
Start: 2025-04-01

## 2025-01-08 RX ORDER — HEPARIN SODIUM (PORCINE) LOCK FLUSH IV SOLN 100 UNIT/ML 100 UNIT/ML
5 SOLUTION INTRAVENOUS
Status: DISCONTINUED | OUTPATIENT
Start: 2025-01-08 | End: 2025-01-08 | Stop reason: HOSPADM

## 2025-01-08 RX ORDER — ONDANSETRON 2 MG/ML
8 INJECTION INTRAMUSCULAR; INTRAVENOUS EVERY 6 HOURS PRN
Status: DISCONTINUED | OUTPATIENT
Start: 2025-01-08 | End: 2025-01-08 | Stop reason: HOSPADM

## 2025-01-08 RX ORDER — KETOROLAC TROMETHAMINE 30 MG/ML
30 INJECTION, SOLUTION INTRAMUSCULAR; INTRAVENOUS ONCE
Start: 2025-04-01 | End: 2025-04-01

## 2025-01-08 RX ORDER — DIPHENHYDRAMINE HCL 50 MG
CAPSULE ORAL
Qty: 2 CAPSULE | Refills: 0 | Status: SHIPPED | OUTPATIENT
Start: 2025-01-08

## 2025-01-08 RX ORDER — KETOROLAC TROMETHAMINE 30 MG/ML
30 INJECTION, SOLUTION INTRAMUSCULAR; INTRAVENOUS ONCE
Status: COMPLETED | OUTPATIENT
Start: 2025-01-08 | End: 2025-01-08

## 2025-01-08 RX ORDER — ONDANSETRON 8 MG/1
8 TABLET, FILM COATED ORAL
Status: DISCONTINUED | OUTPATIENT
Start: 2025-01-08 | End: 2025-01-08 | Stop reason: HOSPADM

## 2025-01-08 RX ORDER — DIPHENHYDRAMINE HCL 25 MG
50 CAPSULE ORAL
Status: COMPLETED | OUTPATIENT
Start: 2025-01-08 | End: 2025-01-08

## 2025-01-08 RX ADMIN — KETOROLAC TROMETHAMINE 30 MG: 30 INJECTION, SOLUTION INTRAMUSCULAR at 10:08

## 2025-01-08 RX ADMIN — HYDROMORPHONE HYDROCHLORIDE 1 MG: 1 INJECTION, SOLUTION INTRAMUSCULAR; INTRAVENOUS; SUBCUTANEOUS at 10:07

## 2025-01-08 RX ADMIN — HYDROMORPHONE HYDROCHLORIDE 1 MG: 1 INJECTION, SOLUTION INTRAMUSCULAR; INTRAVENOUS; SUBCUTANEOUS at 11:07

## 2025-01-08 RX ADMIN — SODIUM CHLORIDE, POTASSIUM CHLORIDE, SODIUM LACTATE AND CALCIUM CHLORIDE 1000 ML: 600; 310; 30; 20 INJECTION, SOLUTION INTRAVENOUS at 10:13

## 2025-01-08 RX ADMIN — HYDROMORPHONE HYDROCHLORIDE 1 MG: 1 INJECTION, SOLUTION INTRAMUSCULAR; INTRAVENOUS; SUBCUTANEOUS at 12:08

## 2025-01-08 RX ADMIN — HEPARIN SODIUM (PORCINE) LOCK FLUSH IV SOLN 100 UNIT/ML 5 ML: 100 SOLUTION at 12:16

## 2025-01-08 RX ADMIN — ONDANSETRON 8 MG: 2 INJECTION INTRAMUSCULAR; INTRAVENOUS at 10:06

## 2025-01-08 RX ADMIN — DIPHENHYDRAMINE HYDROCHLORIDE 50 MG: 25 CAPSULE ORAL at 10:05

## 2025-01-08 NOTE — TELEPHONE ENCOUNTER
"Jennifer called in requesting  assist with transport home today. .  This writer  also updated pt on critical Hgb 7. And assessed pt over the phone.     Pt states is feeling \"okay right now but did sense her Hgb was trending low, yesterday felt more cold/freezing than usual and had to sleep in fluffy sweatshirt with covers. increase fatigue/achey\"     1055 Per Patricia johnson CNP, [facility] with read back ok for lab recheck Friday 1/10 for CBC with Diff and ABO T&S.     1155 Message sent to  to assist with transportation home from pt's same day infusion today.     1100 Scheduling request sent to call patient to assist with scheduling labs for Friday 1/10/2025.   "

## 2025-01-08 NOTE — TELEPHONE ENCOUNTER
"Oncology Nurse Triage - Sickle Cell Pain Crisis:    Situation: Jennifer  calling about Sickle Cell Pain Crisis, requesting to be added on for IV fluids and pain medicine    Background:     Patient's last infusion was 01/06/25  Last clinic visit date:12/19/24 w/Patricia Mantilla  Does patient have active treatment plan? Yes    Assessment of Symptoms:  Onset/Duration of symptoms: 1 day    Is it typical sickle cell pain? Yes  Location: \"everywhere\"  Character: Dull ache  Intensity: 8/10    Any radiation of pain, numbness, tingling, weakness, warmth, swelling, discoloration of arms or legs?No    Fever? No    Chest Pain Present: No    Shortness of breath: No    Other home therapies tried: HEAT/HEATING PAD and WARM BATH     Last home medication taken and when: 10 pm oxycodone    Any Refills Needed?: No    Does patient have transportation & length of time to get to clinic: Yes, if something opens up early. She will need assistance if appt later in the day.      Pt also reporting she is having body aches and chills. She has checked her temperature and is not having a fever.     Recommendations:   If you do not hear from the infusion center by 2pm then you will not be able to get in for an infusion today. If symptoms worsen while waiting for call back, and/or you experience fever, chills, SOB, chest pain, cough, n/v, dizziness, numbness, swelling, discoloration of extremities, then seek emergency evaluation in Emergency Department.     3145 Secure message to Patricia Mantilla    07/18 Patricia approving adding to wait list. Draw cbc with diff, cmp, retic.             "

## 2025-01-08 NOTE — TELEPHONE ENCOUNTER
DATE/TIME OF CALL RECEIVED FROM LAB:  01/08/25 at 10:21 AM   LAB TEST/VALUE:  preliminary hgb 7.0- slides will be sent to hospital   Other values: plt 464  Previous labs from 1/2/25: hgb 7.0, plt 401  PROVIDER NOTIFIED?: Yes  PROVIDER NAME: Patricia Mantilla   TIME LAB VALUE REPORTED TO PROVIDER: 1026  MECHANISM OF PROVIDER NOTIFICATION:  secure chat to Patricia Mantilla and MAURISIO Mcgee.  PROVIDER RESPONSE:  acknowledged, advised RNCC reach out to pt or her infusion nurse if she's still there to see how pt is feeling. Only if she was very symptomatic would I proceed with blood.

## 2025-01-08 NOTE — TELEPHONE ENCOUNTER
Available appt with [facility] at 0900. Call to pt to verify she is able to make appt. Pt confirming she can make appt.     Call to [facility] to verify okay to add on labs today per provider request. Spoke to Travon who will add pt for 1000 infusion and labs at 0900.     Updated pt on lab and infusion time. Pt verbalized understanding. Verified pt had address to infusion center.

## 2025-01-08 NOTE — PROGRESS NOTES
Infusion Nursing Note:  Jennifer Cervantes presents today for IV fluids & pain meds.    Patient seen by provider today: No   present during visit today: Not Applicable.    Note: Pt here for IV fluids and pain meds. Pain 8/10 upon arrival and down to 5/10 upon discharge.     LR 100ml  Dilaudid 1mg IV x3  PO benadryl 50mg x1  IV zofran 8mg x1  IV toradol 30mg X1.      Intravenous Access:  Implanted Port.    Treatment Conditions:  Not Applicable.      Post Infusion Assessment:  Patient tolerated infusion without incident.  Blood return noted pre and post infusion.  Site patent and intact, free from redness, edema or discomfort.  No evidence of extravasations.  Access discontinued per protocol.       Discharge Plan:   Discharge instructions reviewed with: Patient.  Patient and/or family verbalized understanding of discharge instructions and all questions answered.  Patient discharged in stable condition accompanied by: self.  Departure Mode: Ambulatory.      Ruth Baird RN

## 2025-01-08 NOTE — PROGRESS NOTES
Ambulatory Care Management- Transportation Support  Specialty SW - [facility]     Data/Intervention:  Patient Name:    Jennifer Cervantes DOB/Age: 2000 (24 year old)     [facility] team member assisting Specialty SW with transportation request.      Assessment:  CHW/CTA called [employer] to arrange medical appointment transportation in conjunction with patient's insurance.     Taxi company: [employer]    [employer] will be at the front door at 1300.     Plan:  Patient is aware of the transportation plan.  available to assist with any other needs.      Lisandra Kilgore MA

## 2025-01-09 DIAGNOSIS — D57.00 SICKLE CELL DISEASE WITH CRISIS (H): ICD-10-CM

## 2025-01-09 LAB
ABO + RH BLD: NORMAL
BLD GP AB SCN SERPL QL: NEGATIVE
SPECIMEN EXP DATE BLD: NORMAL

## 2025-01-09 RX ORDER — OXYCODONE HYDROCHLORIDE 10 MG/1
10 TABLET ORAL EVERY 6 HOURS PRN
Qty: 12 TABLET | Refills: 0 | Status: SHIPPED | OUTPATIENT
Start: 2025-01-09

## 2025-01-09 NOTE — TELEPHONE ENCOUNTER
Narcotic Refill Request    Medication(s) requested:  oxycodone   Person Requesting Refill: Jennifer   What pain is the medication treating:  sickle cell pain   How is the medication being taken?:takes 2 tabs per day   Does pt have enough for today? All out,   Is pain being adequately controlled on the current regimen?: yes   Experiencing any side effects from medication?: none     Date of most recent appointment:  12/19/24 Patricia Mantilla   Any No Show Visits:No   Next appointment:   1/13/25 Patricia Mantilla   Last fill date and by whom:  1/2/25 Patricia Mantilla    Reviewed: No Access     Send to provider: Patricia mantilla and roxanne pulliam

## 2025-01-10 ENCOUNTER — LAB (OUTPATIENT)
Dept: LAB | Facility: CLINIC | Age: 25
End: 2025-01-10
Attending: PEDIATRICS
Payer: COMMERCIAL

## 2025-01-10 DIAGNOSIS — D64.9 LOW HEMOGLOBIN: ICD-10-CM

## 2025-01-10 LAB
BASOPHILS # BLD AUTO: 0.2 10E3/UL (ref 0–0.2)
BASOPHILS NFR BLD AUTO: 1 %
EOSINOPHIL # BLD AUTO: 0.4 10E3/UL (ref 0–0.7)
EOSINOPHIL NFR BLD AUTO: 3 %
ERYTHROCYTE [DISTWIDTH] IN BLOOD BY AUTOMATED COUNT: 22.3 % (ref 10–15)
HCT VFR BLD AUTO: 19 % (ref 35–47)
HGB BLD-MCNC: 6.7 G/DL (ref 11.7–15.7)
IMM GRANULOCYTES # BLD: 0.1 10E3/UL
IMM GRANULOCYTES NFR BLD: 0 %
LYMPHOCYTES # BLD AUTO: 2.8 10E3/UL (ref 0.8–5.3)
LYMPHOCYTES NFR BLD AUTO: 19 %
MCH RBC QN AUTO: 29.3 PG (ref 26.5–33)
MCHC RBC AUTO-ENTMCNC: 35.3 G/DL (ref 31.5–36.5)
MCV RBC AUTO: 83 FL (ref 78–100)
MONOCYTES # BLD AUTO: 1.1 10E3/UL (ref 0–1.3)
MONOCYTES NFR BLD AUTO: 8 %
NEUTROPHILS # BLD AUTO: 9.9 10E3/UL (ref 1.6–8.3)
NEUTROPHILS NFR BLD AUTO: 69 %
NRBC # BLD AUTO: 0.3 10E3/UL
NRBC BLD AUTO-RTO: 2 /100
PLATELET # BLD AUTO: 504 10E3/UL (ref 150–450)
RBC # BLD AUTO: 2.29 10E6/UL (ref 3.8–5.2)
WBC # BLD AUTO: 14.3 10E3/UL (ref 4–11)

## 2025-01-10 PROCEDURE — 86900 BLOOD TYPING SEROLOGIC ABO: CPT

## 2025-01-10 PROCEDURE — 250N000011 HC RX IP 250 OP 636: Performed by: PEDIATRICS

## 2025-01-10 PROCEDURE — 86850 RBC ANTIBODY SCREEN: CPT

## 2025-01-10 PROCEDURE — 36591 DRAW BLOOD OFF VENOUS DEVICE: CPT

## 2025-01-10 PROCEDURE — 86923 COMPATIBILITY TEST ELECTRIC: CPT | Performed by: PEDIATRICS

## 2025-01-10 PROCEDURE — 85004 AUTOMATED DIFF WBC COUNT: CPT

## 2025-01-10 PROCEDURE — 85014 HEMATOCRIT: CPT

## 2025-01-10 RX ORDER — HEPARIN SODIUM (PORCINE) LOCK FLUSH IV SOLN 100 UNIT/ML 100 UNIT/ML
5 SOLUTION INTRAVENOUS DAILY PRN
Status: DISCONTINUED | OUTPATIENT
Start: 2025-01-10 | End: 2025-01-16 | Stop reason: HOSPADM

## 2025-01-10 RX ADMIN — HEPARIN 3 ML: 100 SYRINGE at 07:50

## 2025-01-10 NOTE — NURSING NOTE
Chief Complaint   Patient presents with    Port Draw     Labs drawn via port by RN in lab. VS taken.      Labs drawn via Port accessed using 20g flat needle. Line flushed and Heparin locked.     Jennifer Lai RN

## 2025-01-13 ENCOUNTER — ONCOLOGY VISIT (OUTPATIENT)
Dept: ONCOLOGY | Facility: CLINIC | Age: 25
End: 2025-01-13
Attending: REGISTERED NURSE
Payer: COMMERCIAL

## 2025-01-13 ENCOUNTER — NURSE TRIAGE (OUTPATIENT)
Dept: ONCOLOGY | Facility: CLINIC | Age: 25
End: 2025-01-13

## 2025-01-13 ENCOUNTER — HOSPITAL ENCOUNTER (EMERGENCY)
Facility: CLINIC | Age: 25
Discharge: LEFT BEFORE DISCHARGE | End: 2025-01-13
Admitting: PEDIATRICS
Payer: COMMERCIAL

## 2025-01-13 VITALS
WEIGHT: 157 LBS | DIASTOLIC BLOOD PRESSURE: 80 MMHG | HEART RATE: 96 BPM | SYSTOLIC BLOOD PRESSURE: 120 MMHG | OXYGEN SATURATION: 98 % | HEIGHT: 64 IN | TEMPERATURE: 98.4 F | BODY MASS INDEX: 26.8 KG/M2 | RESPIRATION RATE: 16 BRPM

## 2025-01-13 VITALS
RESPIRATION RATE: 16 BRPM | DIASTOLIC BLOOD PRESSURE: 75 MMHG | HEART RATE: 99 BPM | OXYGEN SATURATION: 98 % | TEMPERATURE: 98.4 F | SYSTOLIC BLOOD PRESSURE: 124 MMHG

## 2025-01-13 DIAGNOSIS — D57.00 SICKLE CELL DISEASE WITH CRISIS (H): Primary | ICD-10-CM

## 2025-01-13 DIAGNOSIS — E83.111 IRON OVERLOAD DUE TO REPEATED RED BLOOD CELL TRANSFUSIONS: ICD-10-CM

## 2025-01-13 LAB
ALBUMIN SERPL BCG-MCNC: 4.6 G/DL (ref 3.5–5.2)
ALP SERPL-CCNC: 72 U/L (ref 40–150)
ALT SERPL W P-5'-P-CCNC: 38 U/L (ref 0–50)
ANION GAP SERPL CALCULATED.3IONS-SCNC: 9 MMOL/L (ref 7–15)
AST SERPL W P-5'-P-CCNC: 45 U/L (ref 0–45)
BASOPHILS # BLD AUTO: 0.2 10E3/UL (ref 0–0.2)
BASOPHILS NFR BLD AUTO: 2 %
BILIRUB SERPL-MCNC: 2 MG/DL
BUN SERPL-MCNC: 7.8 MG/DL (ref 6–20)
CALCIUM SERPL-MCNC: 9.3 MG/DL (ref 8.8–10.4)
CHLORIDE SERPL-SCNC: 107 MMOL/L (ref 98–107)
CREAT SERPL-MCNC: 0.43 MG/DL (ref 0.51–0.95)
EGFRCR SERPLBLD CKD-EPI 2021: >90 ML/MIN/1.73M2
EOSINOPHIL # BLD AUTO: 0.3 10E3/UL (ref 0–0.7)
EOSINOPHIL NFR BLD AUTO: 3 %
ERYTHROCYTE [DISTWIDTH] IN BLOOD BY AUTOMATED COUNT: 21.2 % (ref 10–15)
GLUCOSE SERPL-MCNC: 106 MG/DL (ref 70–99)
HCO3 SERPL-SCNC: 25 MMOL/L (ref 22–29)
HCT VFR BLD AUTO: 24.5 % (ref 35–47)
HGB BLD-MCNC: 8.5 G/DL (ref 11.7–15.7)
IMM GRANULOCYTES # BLD: 0.1 10E3/UL
IMM GRANULOCYTES NFR BLD: 1 %
LYMPHOCYTES # BLD AUTO: 1.9 10E3/UL (ref 0.8–5.3)
LYMPHOCYTES NFR BLD AUTO: 15 %
MCH RBC QN AUTO: 29.2 PG (ref 26.5–33)
MCHC RBC AUTO-ENTMCNC: 34.7 G/DL (ref 31.5–36.5)
MCV RBC AUTO: 84 FL (ref 78–100)
MONOCYTES # BLD AUTO: 1 10E3/UL (ref 0–1.3)
MONOCYTES NFR BLD AUTO: 8 %
NEUTROPHILS # BLD AUTO: 8.7 10E3/UL (ref 1.6–8.3)
NEUTROPHILS NFR BLD AUTO: 72 %
NRBC # BLD AUTO: 0.2 10E3/UL
NRBC BLD AUTO-RTO: 2 /100
PLAT MORPH BLD: ABNORMAL
PLATELET # BLD AUTO: 617 10E3/UL (ref 150–450)
POTASSIUM SERPL-SCNC: 3.7 MMOL/L (ref 3.4–5.3)
PROT SERPL-MCNC: 8.3 G/DL (ref 6.4–8.3)
RBC # BLD AUTO: 2.91 10E6/UL (ref 3.8–5.2)
RBC MORPH BLD: ABNORMAL
RETICS # AUTO: 0.5 10E6/UL (ref 0.03–0.1)
RETICS/RBC NFR AUTO: 17.1 % (ref 0.5–2)
SICKLE CELLS BLD QL SMEAR: ABNORMAL
SODIUM SERPL-SCNC: 141 MMOL/L (ref 135–145)
TARGETS BLD QL SMEAR: ABNORMAL
WBC # BLD AUTO: 12.1 10E3/UL (ref 4–11)

## 2025-01-13 PROCEDURE — 85025 COMPLETE CBC W/AUTO DIFF WBC: CPT | Performed by: REGISTERED NURSE

## 2025-01-13 PROCEDURE — 80053 COMPREHEN METABOLIC PANEL: CPT | Performed by: REGISTERED NURSE

## 2025-01-13 PROCEDURE — G0463 HOSPITAL OUTPT CLINIC VISIT: HCPCS | Performed by: REGISTERED NURSE

## 2025-01-13 PROCEDURE — 99215 OFFICE O/P EST HI 40 MIN: CPT | Performed by: REGISTERED NURSE

## 2025-01-13 PROCEDURE — 82040 ASSAY OF SERUM ALBUMIN: CPT | Performed by: REGISTERED NURSE

## 2025-01-13 PROCEDURE — 99281 EMR DPT VST MAYX REQ PHY/QHP: CPT | Performed by: PEDIATRICS

## 2025-01-13 PROCEDURE — 36415 COLL VENOUS BLD VENIPUNCTURE: CPT | Performed by: REGISTERED NURSE

## 2025-01-13 PROCEDURE — G2211 COMPLEX E/M VISIT ADD ON: HCPCS | Performed by: REGISTERED NURSE

## 2025-01-13 PROCEDURE — 250N000011 HC RX IP 250 OP 636: Performed by: REGISTERED NURSE

## 2025-01-13 PROCEDURE — 85045 AUTOMATED RETICULOCYTE COUNT: CPT | Performed by: REGISTERED NURSE

## 2025-01-13 PROCEDURE — 36591 DRAW BLOOD OFF VENOUS DEVICE: CPT

## 2025-01-13 RX ORDER — HEPARIN SODIUM (PORCINE) LOCK FLUSH IV SOLN 100 UNIT/ML 100 UNIT/ML
5 SOLUTION INTRAVENOUS ONCE
Status: COMPLETED | OUTPATIENT
Start: 2025-01-13 | End: 2025-01-13

## 2025-01-13 RX ADMIN — Medication 5 ML: at 13:47

## 2025-01-13 NOTE — NURSING NOTE
Labs drawn as ordered via Port per Patricia Mantilla NP. Patient tolerated well.    Maritza Leonard RN

## 2025-01-13 NOTE — ED TRIAGE NOTES
Pt. hx sicckle cell, now c/o leg pain, tried to get into infusion clinic today but they were full.     Triage Assessment (Adult)       Row Name 01/13/25 9478          Triage Assessment    Airway WDL WDL        Respiratory WDL    Respiratory WDL WDL        Skin Circulation/Temperature WDL    Skin Circulation/Temperature WDL WDL        Cardiac WDL    Cardiac WDL WDL        Peripheral/Neurovascular WDL    Peripheral Neurovascular WDL WDL        Cognitive/Neuro/Behavioral WDL    Cognitive/Neuro/Behavioral WDL WDL        Diana Coma Scale    Best Eye Response 4-->(E4) spontaneous     Best Motor Response 6-->(M6) obeys commands     Best Verbal Response 5-->(V5) oriented     Diana Coma Scale Score 15

## 2025-01-13 NOTE — PROGRESS NOTES
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 She had multiple thromboembolic events in 2021 despite adherent anticoagulation use (though warfarin was perpetually low) and there are concerns for chronic thromboembolic disease but did not have pulmonary HTN on a November 2021 cath. She is maintained on chronic PO opioids and twice-weekly infusion visits (since 1/24/22) but has been able to be maintained on this regimen and has stayed out of the ED most of the time with even rarer admissions for most of 2023. In 2024, her ED visits have increased somewhat but admissions remain rare.    Interval History:  Jennifer is seen for routine follow-up today. Pain has been worse since last Friday when she received blood. She can usually tell when she's in need of a transfusion due to cold intolerance and fatigue. She feels transfusions have historically triggered pain crises for her. She avoided going to the ED over the weekend but is considering this today as there is no space in infusion. Her stomach hurts but n/v have improved recently.         Sickle Cell Disease Comprehensive Checklist  Bone Health/Avascular Necrosis Screening/Symptoms (each visit): no new concerns today  Leg Ulcer evaluation (every visit): nothing to note  Hypertension (every visit):stable none for several months  Last pulmonary evaluation (asthma, AMAN, pulm HTN): 9/28/22, due for follow-up  Stroke/silent cerebral infarct Hx (Y/N): Yes TIA ~2014, first event ~age 1 with full stroke and R sided  weakness  Last PCP Visit: 9/30/24 with Dr. Case  Vaccines:  PCV13: 5/13/19  Pneumovax (PPSV23): 3/04, 10/09, 7/12/19, 10/2024, due in 2029  Menactra: 4/2010, 9/2015 (MCV done 8/16/21)  MenB: 9/16/15, 5/13/19 (due in 2024)  Influenza: 10/11/24  Audiology (chelation): done 2/27/24    Comparison to Previous Audiogram dated 6/6/2022:     Pure Tone Thresholds 250-8000 Hz:    RIGHT: stable    LEFT: stable     High Frequency Audiometry 9,000-16,000Hz:     RIGHT: stable    LEFT: stable     Word Recognition Score:    RIGHT: stable    LEFT: stable    Plan last reviewed with patient: 10/2/23    Patient background: 24 yo F, enjoys movies and kids though there are times where she does not really want to talk to people. Does not have a lot of social support at home.     Sickle Cell Disease History  Primary Hematologist Team: Jose Rafael Duncan  PCP: none  Genotype: SS  Acute Pain Crisis Treatment: (limiting IV)   ER   Dilaudid 1 mg IV q1h up to 3 doses  Toradol 30 mg IV x1   Maintenance IV fluids with LR  Other: Zofran 8 mg IV PRN nausea  Inpatient:  PCA Dilaudid 1 hr q30 minutes, no basal rate  Toradol 30 mg x6h x 4 hr  LR maintenance x 1-2 days  Other Medications: Zofran  ASA  Supportive Care: Docusate, Senna  Chronic Pain Medications:    Home regimen: oxycodone 15 mg p.o. q.4-6 hours p.r.n. breakthrough pain.  She also continues on Voltaren gel, and Zoloft among other medications.    -Also benefits from mental health visits, acupuncture  Baseline Hemoglobin: 7 g/dl without chronic transfusions  Hydroxyurea use: Yes  H/O blood transfusions: Yes, several (iron overload) Most recent 11/20/2021  H/O Transfusion Reactions: no  Antibodies:none  H/O Acute Chest Syndrome: Yes  Last episode:9/05/22 (previously 4/26/21, 10/2019)   ICU/intubation: not with 9/2022 admission  H/O Stroke: Yes (managed with chronic transfusions in the past, stopped late Spring 2020)  H/O VTE: Yes (2/2021)  H/O Cholecystectomy or Splenectomy: no  H/O Asthma,  Pulm HTN, AVN, Leg Ulcers, Nephropathy, Retinopathy, etc: Iron overload, asthma, chronic lung disease, physical limitations from early stroke    ---------------------------------------  Jennifer Cervantes's Goals (not discussed 1/13/25)    1-3 month goal:      6 month goal:      12 month goal:      Disease-specific goal(s):  Decrease opioid use.  Decreased frequency of ED visits.      Current Outpatient Medications   Medication Sig Dispense Refill    albuterol (PROVENTIL) (2.5 MG/3ML) 0.083% neb solution Take 2 vials (5 mg) by nebulization every 6 hours as needed for shortness of breath or wheezing. 90 mL 3    aspirin (ASA) 81 MG chewable tablet Take 1 tablet (81 mg) by mouth 2 times daily 60 tablet 11    budesonide-formoterol (SYMBICORT) 160-4.5 MCG/ACT Inhaler Inhale 2 puffs twice daily plus 1-2 puffs as needed. May use up to 12 puffs per day. 20.4 g 11    deferasirox (JADENU) 360 MG tablet Take 4 tablets (1,440 mg) by mouth daily. 120 tablet 11    Deferiprone, Twice Daily, 1000 MG TABS Take 2,000 mg by mouth 2 times daily. 150 tablet 3    diphenhydrAMINE (BENADRYL) 50 MG capsule Administer 12  hours pre - IV contrast injection and 2 hours prior to contrast. Patient must have a . 2 capsule 0    EPINEPHrine (ANY BX GENERIC EQUIV) 0.3 MG/0.3ML injection 2-pack Inject 0.3 mLs (0.3 mg) into the muscle as needed for anaphylaxis. 1 each 1    gabapentin (NEURONTIN) 300 MG capsule Take 1 capsule (300 mg) by mouth 3 times daily. Take PO 1 pill in evening (300 mg) TID to start. If tolerated, increase by 300 mg in morning or lunch every few days, updating your doctor's office in time to get refills. The maximum dose is 900 mg TID. 90 capsule 1    hydroxyurea (HYDREA) 500 MG capsule Take 6 capsules (3,000 mg) by mouth daily 180 capsule 11    ibuprofen (ADVIL/MOTRIN) 800 MG tablet Take 800 mg by mouth every 8 hours as needed for moderate pain      methocarbamol (ROBAXIN) 750 MG tablet Take 1 tablet (750 mg) by mouth 4 times  daily as needed for muscle spasms (during sickle pain crises. Okay to take scheduled while in pain). 60 tablet 1    methylPREDNISolone (MEDROL) 32 MG tablet Take 1 tab 12 hours before appointment and 1 tab 2 hours prior to the procedure with IV contrast. 2 tablet 0    naloxone (NARCAN) 4 MG/0.1ML nasal spray Spray 4 mg into one nostril alternating nostrils as needed for opioid reversal. every 2-3 minutes until assistance arrives 0.2 mL 0    oxyCODONE IR (ROXICODONE) 10 MG tablet Take 1 tablet (10 mg) by mouth every 6 hours as needed for severe pain (Goal of less than 4 per day). 12 tablet 0     Past Medical History  Past Medical History:   Diagnosis Date    Anxiety     Bleeding disorder     Blood clotting disorder     Cerebral infarction (H) 2015    Cognitive developmental delay     low IQ. 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 side (H)     right hand contractures    Iron overload due to repeated red blood cell transfusions     Migraines     Multiple subsegmental pulmonary emboli without acute cor pulmonale (H) 02/01/2021    Oppositional defiant behavior     Presence of intrauterine contraceptive device 2/18/2020    Superficial venous thrombosis of arm, right 03/25/2021    Uncomplicated asthma      Past Surgical History:   Procedure Laterality Date    AS INSERT TUNNELED CV 2 CATH W/O PORT/PUMP      CHOLECYSTECTOMY      CV RIGHT HEART CATH MEASUREMENTS RECORDED N/A 11/18/2021    Procedure: Right Heart Cath;  Surgeon: Jackson Stauffer MD;  Location:  [facility]ATH [facility]    INSERT PORT VASCULAR ACCESS Left 4/21/2021    Procedure: INSERTION, VASCULAR ACCESS PORT (NOT SURE ON SIDE UNTIL REMOVAL);  Surgeon: Rajan More MD;  Location: [facility] OR    IR CHEST PORT PLACEMENT > 5 YRS OF AGE  4/21/2021    IR CVC NON TUNNEL LINE REMOVAL  5/6/2021    IR CVC NON TUNNEL PLACEMENT > 5 YRS   04/07/2020    IR CVC NON TUNNEL PLACEMENT > 5 YRS  4/30/2021    IR CVC NON TUNNEL PLACEMENT > 5 YRS  9/7/2022    IR PORT REMOVAL LEFT  4/21/2021    REMOVE PORT VASCULAR ACCESS Left 4/21/2021    Procedure: REMOVAL, VASCULAR ACCESS PORT LEFT;  Surgeon: Rajan More MD;  Location: [facility] OR    REPAIR TENDON ELBOW Right 10/02/2019    Procedure: Right Elbow Flexor Lengthening, Flexor Pronator Slide Of Wrist and Finger, Thumb Adductor Lengthening;  Surgeon: Anai Franco MD;  Location: [facility] OR    TONSILLECTOMY Bilateral 10/02/2019    Procedure: Bilateral Tonsillectomy;  Surgeon: Farhana Guy MD;  Location: [facility] OR    ZZC BREAST REDUCTION (INCLUDES LIPO) TIER 3 Bilateral 04/23/2019     Allergies   Allergen Reactions    Contrast Dye Angioedema     Hives and breathing issues    Fish-Derived Products Hives    Seafood Hives    Adhesive Tape Hives     Primipore dressing causes hives    Gadolinium     Iodinated Contrast Media      Social History   Social History     Tobacco Use    Smoking status: Never     Passive exposure: Never    Smokeless tobacco: Never   Substance Use Topics    Alcohol use: Not Currently     Alcohol/week: 0.0 standard drinks of alcohol    Drug use: Never   Past medical history and social history were reviewed.    Physical Examination:  /75 (BP Location: Left arm, Patient Position: Sitting, Cuff Size: Adult Regular)   Pulse 99   Temp 98.4  F (36.9  C) (Oral)   Resp 16   LMP  (LMP Unknown)   SpO2 98%      Wt Readings from Last 10 Encounters:   01/02/25 71.6 kg (157 lb 12.8 oz)   12/27/24 68 kg (150 lb)   12/20/24 73.2 kg (161 lb 4.8 oz)   12/16/24 70.3 kg (155 lb)   12/13/24 70.3 kg (155 lb)   12/07/24 72.2 kg (159 lb 1.6 oz)   11/27/24 70.8 kg (156 lb)   11/22/24 72 kg (158 lb 11.2 oz)   11/20/24 75.2 kg (165 lb 11.2 oz)   11/15/24 74.8 kg (165 lb)     General: Pleasant female, NAD  Eyes: EOMI, PERRL  ENT: oral mucosa moist, pink.  No tonsillar exudate  Respiratory: normal  respiratory effort  Ext: no peripheral edema  Neurologic: chronic contracture of right arm and wrist. Steady gait. A/O x 4.  Skin: No rashes, petechiae, or bruising noted on exposed skin.   Laboratory Data:  Recent Results (from the past 240 hours)   Comprehensive metabolic panel    Collection Time: 01/08/25  9:36 AM   Result Value Ref Range    Sodium 140 135 - 145 mmol/L    Potassium 4.1 3.4 - 5.3 mmol/L    Carbon Dioxide (CO2) 21 (L) 22 - 29 mmol/L    Anion Gap 14 7 - 15 mmol/L    Urea Nitrogen 9.1 6.0 - 20.0 mg/dL    Creatinine 0.50 (L) 0.51 - 0.95 mg/dL    GFR Estimate >90 >60 mL/min/1.73m2    Calcium 9.1 8.8 - 10.4 mg/dL    Chloride 105 98 - 107 mmol/L    Glucose 109 (H) 70 - 99 mg/dL    Alkaline Phosphatase 74 40 - 150 U/L    AST 45 0 - 45 U/L    ALT 32 0 - 50 U/L    Protein Total 8.2 6.4 - 8.3 g/dL    Albumin 4.6 3.5 - 5.2 g/dL    Bilirubin Total 2.6 (H) <=1.2 mg/dL   Reticulocyte count    Collection Time: 01/08/25  9:36 AM   Result Value Ref Range    % Reticulocyte 16.9 (H) 0.5 - 2.0 %    Absolute Reticulocyte 0.383 (H) 0.025 - 0.095 10e6/uL   CBC with platelets and differential    Collection Time: 01/08/25  9:36 AM   Result Value Ref Range    WBC Count 20.0 (H) 4.0 - 11.0 10e3/uL    RBC Count 2.33 (L) 3.80 - 5.20 10e6/uL    Hemoglobin 7.0 (L) 11.7 - 15.7 g/dL    Hematocrit 19.9 (L) 35.0 - 47.0 %    MCV 85 78 - 100 fL    MCH 30.0 26.5 - 33.0 pg    MCHC 35.2 31.5 - 36.5 g/dL    RDW 21.9 (H) 10.0 - 15.0 %    Platelet Count 448 150 - 450 10e3/uL    % Neutrophils 80 %    % Lymphocytes 11 %    % Monocytes 6 %    % Eosinophils 1 %    % Basophils 1 %    % Immature Granulocytes 0 %    NRBCs per 100 WBC 1 (H) <1 /100    Absolute Neutrophils 16.0 (H) 1.6 - 8.3 10e3/uL    Absolute Lymphocytes 2.2 0.8 - 5.3 10e3/uL    Absolute Monocytes 1.3 0.0 - 1.3 10e3/uL    Absolute Eosinophils 0.3 0.0 - 0.7 10e3/uL    Absolute Basophils 0.1 0.0 - 0.2 10e3/uL    Absolute Immature Granulocytes 0.1 <=0.4 10e3/uL    Absolute NRBCs 0.3  10e3/uL   CBC with platelets and differential    Collection Time: 01/10/25  7:56 AM   Result Value Ref Range    WBC Count 14.3 (H) 4.0 - 11.0 10e3/uL    RBC Count 2.29 (L) 3.80 - 5.20 10e6/uL    Hemoglobin 6.7 (LL) 11.7 - 15.7 g/dL    Hematocrit 19.0 (L) 35.0 - 47.0 %    MCV 83 78 - 100 fL    MCH 29.3 26.5 - 33.0 pg    MCHC 35.3 31.5 - 36.5 g/dL    RDW 22.3 (H) 10.0 - 15.0 %    Platelet Count 504 (H) 150 - 450 10e3/uL    % Neutrophils 69 %    % Lymphocytes 19 %    % Monocytes 8 %    % Eosinophils 3 %    % Basophils 1 %    % Immature Granulocytes 0 %    NRBCs per 100 WBC 2 (H) <1 /100    Absolute Neutrophils 9.9 (H) 1.6 - 8.3 10e3/uL    Absolute Lymphocytes 2.8 0.8 - 5.3 10e3/uL    Absolute Monocytes 1.1 0.0 - 1.3 10e3/uL    Absolute Eosinophils 0.4 0.0 - 0.7 10e3/uL    Absolute Basophils 0.2 0.0 - 0.2 10e3/uL    Absolute Immature Granulocytes 0.1 <=0.4 10e3/uL    Absolute NRBCs 0.3 10e3/uL   Adult Type and Screen    Collection Time: 01/10/25  7:56 AM   Result Value Ref Range    ABO/RH(D) O POS     Antibody Screen Negative Negative    SPECIMEN EXPIRATION DATE [id number]    Prepare red blood cells (unit)    Collection Time: 01/10/25  9:21 AM   Result Value Ref Range    Blood Component Type Red Blood Cells     Product Code H6863T80     Unit Status Transfused     Unit Number [id number]     CROSSMATCH Compatible     CODING SYSTEM [id number]     ISSUE DATE AND TIME [id number]     UNIT ABO/RH O+     UNIT TYPE ISBT 5100    Comprehensive metabolic panel    Collection Time: 01/13/25  1:47 PM   Result Value Ref Range    Sodium 141 135 - 145 mmol/L    Potassium 3.7 3.4 - 5.3 mmol/L    Carbon Dioxide (CO2) 25 22 - 29 mmol/L    Anion Gap 9 7 - 15 mmol/L    Urea Nitrogen 7.8 6.0 - 20.0 mg/dL    Creatinine 0.43 (L) 0.51 - 0.95 mg/dL    GFR Estimate >90 >60 mL/min/1.73m2    Calcium 9.3 8.8 - 10.4 mg/dL    Chloride 107 98 - 107 mmol/L    Glucose 106 (H) 70 - 99 mg/dL    Alkaline Phosphatase 72 40 - 150 U/L    AST 45 0 - 45  U/L    ALT 38 0 - 50 U/L    Protein Total 8.3 6.4 - 8.3 g/dL    Albumin 4.6 3.5 - 5.2 g/dL    Bilirubin Total 2.0 (H) <=1.2 mg/dL   Reticulocyte count    Collection Time: 01/13/25  1:47 PM   Result Value Ref Range    % Reticulocyte 17.1 (H) 0.5 - 2.0 %    Absolute Reticulocyte 0.498 (H) 0.025 - 0.095 10e6/uL   CBC with platelets and differential    Collection Time: 01/13/25  1:47 PM   Result Value Ref Range    WBC Count 12.1 (H) 4.0 - 11.0 10e3/uL    RBC Count 2.91 (L) 3.80 - 5.20 10e6/uL    Hemoglobin 8.5 (L) 11.7 - 15.7 g/dL    Hematocrit 24.5 (L) 35.0 - 47.0 %    MCV 84 78 - 100 fL    MCH 29.2 26.5 - 33.0 pg    MCHC 34.7 31.5 - 36.5 g/dL    RDW 21.2 (H) 10.0 - 15.0 %    Platelet Count 617 (H) 150 - 450 10e3/uL    % Neutrophils 72 %    % Lymphocytes 15 %    % Monocytes 8 %    % Eosinophils 3 %    % Basophils 2 %    % Immature Granulocytes 1 %    NRBCs per 100 WBC 2 (H) <1 /100    Absolute Neutrophils 8.7 (H) 1.6 - 8.3 10e3/uL    Absolute Lymphocytes 1.9 0.8 - 5.3 10e3/uL    Absolute Monocytes 1.0 0.0 - 1.3 10e3/uL    Absolute Eosinophils 0.3 0.0 - 0.7 10e3/uL    Absolute Basophils 0.2 0.0 - 0.2 10e3/uL    Absolute Immature Granulocytes 0.1 <=0.4 10e3/uL    Absolute NRBCs 0.2 10e3/uL   RBC and Platelet Morphology    Collection Time: 01/13/25  1:47 PM   Result Value Ref Range    RBC Morphology Confirmed RBC Indices     Platelet Assessment  Automated Count Confirmed. Platelet morphology is normal.     Automated Count Confirmed. Platelet morphology is normal.    Sickle Cells Moderate (A) None Seen    Target Cells Moderate (A) None Seen     I reviewed the above labs today.    Assessment and Plan:  1. Sickle Cell HgbSS Disease  2. Acute pain crises  3. Chronic Pain  4. 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 History of CVA  7. Hearing loss  8. Nausea/vomiting       Jennifer is seen today for routine follow-up. She has had two hospital admissions late last year, one in  late November and most recently 12/6-12/10 for uncomplicated pain crisis. So far in 2025 she has not been to the ED although may go today as there is no room in infusion for fluids and pain medication. She remains on oxycodone at home and is currently receiving 12 tablets per week, a slight increase from 10 tabs/week last year.     With her decreased utilization of the ED I recommended we do not make any changes in her home oxycodone prescriptions at this time which she is in agreement with.     Deferiprone was added back to her iron chelation regimen. She had been off of this for a few months due to apparent confusion with the pharmacy over taking dual treatment with Jadenu. She is scheduled for a Ferriscan 11/30/25.       -------------------------------------  Plan:  -Continue Hydrea to 3000mg daily to help lessen frequency of sickle cell pain.   -Continue monthly crizanlizumab infusions  -Continue to reassess plan for home oxycodone use.  At this time we will continue prescriptions for 12 tablets per week.  We can continue to decrease weekly if she desires.  The ultimate goal would be to avoid any dose or quantity escalations.  -She can self-reduce infusion days/week and we will continue to keep the cap at 2/week for now. I did make an exception to this last week to allow for a third infusion when she was here receiving a blood transfusion in an effort to avoid going to the ED.  -Continue infusion center visits limited to two times per week (Mondays and Fridays ideally although still needs to call to request). Continue diligent home management with current medications, heat, rest, compression, warm baths.   -If unable to manage at home can go to ED  with continued plan to use IV Dilaudid and take a break from ketamine (10/2/23). No PCA use and goal for any admission would still be to discharge by 5 days or less  -EGD for evaluation of ongoing n/v and abdominal pain, now rescheduled multiple times due to issues  with transportation.  Will request rescheduling and can inquire about hospital admission following procedure although unlikely.  Of note she has missed abdominal imaging as ordered by Dr. Case and has declined H. pylori testing as recommended previously.  -Continue metoclopramide 5 mg TID PRN if this continues to be helpful   - Continue Jadenu for iron overload and deferiprone. Due for Ferriscan which was canceled in November 2024, now scheduled 1/30/25.  -Continue aspirin BID  -Due at some point for Meningitis B booster.  -RTC with me in 2 weeks to coordinate with sidney Mantilla CNP  ---  40 minutes spent on the date of the encounter doing chart review, review of test results, interpretation of tests, patient visit, and documentation.    The longitudinal plan of care for the diagnosis(es)/condition(s) as documented were addressed during this visit. Due to the added complexity in care, I will continue to support Jennifer in the subsequent management and with ongoing continuity of care.

## 2025-01-13 NOTE — NURSING NOTE
"Oncology Rooming Note    January 13, 2025 1:13 PM   Jennifer Cervantes is a 24 year old female who presents for:    Chief Complaint   Patient presents with    Hematology     Sickle Cell Anemia     Initial Vitals: /75 (BP Location: Left arm, Patient Position: Sitting, Cuff Size: Adult Regular)   Pulse 99   Temp 98.4  F (36.9  C) (Oral)   Resp 16   LMP  (LMP Unknown)   SpO2 98%  Estimated body mass index is 27.09 kg/m  as calculated from the following:    Height as of 12/27/24: 1.626 m (5' 4\").    Weight as of 1/2/25: 71.6 kg (157 lb 12.8 oz). There is no height or weight on file to calculate BSA.  Data Unavailable Comment: Data Unavailable   No LMP recorded (lmp unknown). Patient has had an implant.  Allergies reviewed: Yes  Medications reviewed: Yes    Medications: Medication refills not needed today.  Pharmacy name entered into EPIC:    [facility] - [place], [place] - [address] [place] [address] [id number]  [facility] - [place], [place] - [address] [place] [address]    Frailty Screening:   Is the patient here for a new oncology consult visit in cancer care? 2. No      Clinical concerns: Pain today is 9/10 in both of her legs. Will discuss with provider.      Stacey Murphy LPN  1/13/2025                "

## 2025-01-13 NOTE — TELEPHONE ENCOUNTER
Oncology Nurse Triage - Sickle Cell Pain Crisis:    Situation: Jennifer  calling about Sickle Cell Pain Crisis, requesting to be added on for IV fluids and pain medicine    Background:     Patient's last infusion was 1/10/25  Last clinic visit date:12/19/24 w/ Patricia Mantilla  Does patient have active treatment plan? Yes    Assessment of Symptoms:  Onset/Duration of symptoms: 2 day    Is it typical sickle cell pain? Yes  Location: arms and legs  Character: Sharp  Intensity: 9/10    Any radiation of pain, numbness, tingling, weakness, warmth, swelling, discoloration of arms or legs?No    Fever? No    Chest Pain Present: No    Shortness of breath: No    Other home therapies tried: HEAT/HEATING PAD and WARM BATH     Last home medication taken and when: 9pm last night Oxycodone and IBU     Any Refills Needed?: No    Does patient have transportation & length of time to get to clinic: Yes- 20 minuets away and can get herself in if early appt opens and then would need transportation homes. Would like transportation if later appts.     Recommendations:   Added to infusion wait list.     If you do not hear from the infusion center by 2pm then you will not be able to get in for an infusion today. If symptoms worsen while waiting for call back, and/or you experience fever, chills, SOB, chest pain, cough, n/v, dizziness, numbness, swelling, discoloration of extremities, then seek emergency evaluation in Emergency Department.

## 2025-01-13 NOTE — Clinical Note
1/13/2025      Jennifer Cervantes  [address]  [facility] [address]      Dear Colleague,    Thank you for referring your patient, Jennifer Cervantes, to the [facility]. Please see a copy of my visit note below.    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 She had multiple thromboembolic events in 2021 despite adherent anticoagulation use (though warfarin was perpetually low) and there are concerns for chronic thromboembolic disease but did not have pulmonary HTN on a November 2021 cath. She is maintained on chronic PO opioids and twice-weekly infusion visits (since 1/24/22) but has been able to be maintained on this regimen and has stayed out of the ED most of the time with even rarer admissions for most of 2023. In 2024, her ED visits have increased somewhat but admissions remain rare.    Interval History:  Jennifer is seen for routine follow-up today. Pain has been worse since last Friday when she received blood. She can usually tell when she's in need of a transfusion due to cold intolerance and fatigue. She feels transfusions have historically triggered pain crises for her. She avoided going to the ED over the weekend but is considering this today as there is no space in infusion. Her stomach hurts but n/v have improved recently.         Sickle Cell Disease Comprehensive Checklist  Bone Health/Avascular Necrosis Screening/Symptoms (each visit): no new concerns today  Leg Ulcer evaluation (every visit): nothing to  note  Hypertension (every visit):stable none for several months  Last pulmonary evaluation (asthma, AMAN, pulm HTN): 9/28/22, due for follow-up  Stroke/silent cerebral infarct Hx (Y/N): Yes TIA ~2014, first event ~age 1 with full stroke and R sided weakness  Last PCP Visit: 9/30/24 with Dr. Case  Vaccines:  PCV13: 5/13/19  Pneumovax (PPSV23): 3/04, 10/09, 7/12/19, 10/2024, due in 2029  Menactra: 4/2010, 9/2015 (MCV done 8/16/21)  MenB: 9/16/15, 5/13/19 (due in 2024)  Influenza: 10/11/24  Audiology (chelation): done 2/27/24    Comparison to Previous Audiogram dated 6/6/2022:     Pure Tone Thresholds 250-8000 Hz:    RIGHT: stable    LEFT: stable     High Frequency Audiometry 9,000-16,000Hz:     RIGHT: stable    LEFT: stable     Word Recognition Score:    RIGHT: stable    LEFT: stable    Plan last reviewed with patient: 10/2/23    Patient background: 22 yo F, enjoys movies and kids though there are times where she does not really want to talk to people. Does not have a lot of social support at home.     Sickle Cell Disease History  Primary Hematologist Team: Jose Rafael Duncan  PCP: none  Genotype: SS  Acute Pain Crisis Treatment: (limiting IV)   ER   Dilaudid 1 mg IV q1h up to 3 doses  Toradol 30 mg IV x1   Maintenance IV fluids with LR  Other: Zofran 8 mg IV PRN nausea  Inpatient:  PCA Dilaudid 1 hr q30 minutes, no basal rate  Toradol 30 mg x6h x 4 hr  LR maintenance x 1-2 days  Other Medications: Zofran  ASA  Supportive Care: Docusate, Senna  Chronic Pain Medications:    Home regimen: oxycodone 15 mg p.o. q.4-6 hours p.r.n. breakthrough pain.  She also continues on Voltaren gel, and Zoloft among other medications.    -Also benefits from mental health visits, acupuncture  Baseline Hemoglobin: 7 g/dl without chronic transfusions  Hydroxyurea use: Yes  H/O blood transfusions: Yes, several (iron overload) Most recent 11/20/2021  H/O Transfusion Reactions: no  Antibodies:none  H/O Acute Chest Syndrome: Yes  Last  episode:9/05/22 (previously 4/26/21, 10/2019)   ICU/intubation: not with 9/2022 admission  H/O Stroke: Yes (managed with chronic transfusions in the past, stopped late Spring 2020)  H/O VTE: Yes (2/2021)  H/O Cholecystectomy or Splenectomy: no  H/O Asthma, Pulm HTN, AVN, Leg Ulcers, Nephropathy, Retinopathy, etc: Iron overload, asthma, chronic lung disease, physical limitations from early stroke    ---------------------------------------  Jennifer Cervantes's Goals (not discussed 1/13/25)    1-3 month goal:      6 month goal:      12 month goal:      Disease-specific goal(s):  Decrease opioid use.  Decreased frequency of ED visits.      Current Outpatient Medications   Medication Sig Dispense Refill    albuterol (PROVENTIL) (2.5 MG/3ML) 0.083% neb solution Take 2 vials (5 mg) by nebulization every 6 hours as needed for shortness of breath or wheezing. 90 mL 3    aspirin (ASA) 81 MG chewable tablet Take 1 tablet (81 mg) by mouth 2 times daily 60 tablet 11    budesonide-formoterol (SYMBICORT) 160-4.5 MCG/ACT Inhaler Inhale 2 puffs twice daily plus 1-2 puffs as needed. May use up to 12 puffs per day. 20.4 g 11    deferasirox (JADENU) 360 MG tablet Take 4 tablets (1,440 mg) by mouth daily. 120 tablet 11    Deferiprone, Twice Daily, 1000 MG TABS Take 2,000 mg by mouth 2 times daily. 150 tablet 3    diphenhydrAMINE (BENADRYL) 50 MG capsule Administer 12  hours pre - IV contrast injection and 2 hours prior to contrast. Patient must have a . 2 capsule 0    EPINEPHrine (ANY BX GENERIC EQUIV) 0.3 MG/0.3ML injection 2-pack Inject 0.3 mLs (0.3 mg) into the muscle as needed for anaphylaxis. 1 each 1    gabapentin (NEURONTIN) 300 MG capsule Take 1 capsule (300 mg) by mouth 3 times daily. Take PO 1 pill in evening (300 mg) TID to start. If tolerated, increase by 300 mg in morning or lunch every few days, updating your doctor's office in time to get refills. The maximum dose is 900 mg TID. 90 capsule 1    hydroxyurea (HYDREA) 500  MG capsule Take 6 capsules (3,000 mg) by mouth daily 180 capsule 11    ibuprofen (ADVIL/MOTRIN) 800 MG tablet Take 800 mg by mouth every 8 hours as needed for moderate pain      methocarbamol (ROBAXIN) 750 MG tablet Take 1 tablet (750 mg) by mouth 4 times daily as needed for muscle spasms (during sickle pain crises. Okay to take scheduled while in pain). 60 tablet 1    methylPREDNISolone (MEDROL) 32 MG tablet Take 1 tab 12 hours before appointment and 1 tab 2 hours prior to the procedure with IV contrast. 2 tablet 0    naloxone (NARCAN) 4 MG/0.1ML nasal spray Spray 4 mg into one nostril alternating nostrils as needed for opioid reversal. every 2-3 minutes until assistance arrives 0.2 mL 0    oxyCODONE IR (ROXICODONE) 10 MG tablet Take 1 tablet (10 mg) by mouth every 6 hours as needed for severe pain (Goal of less than 4 per day). 12 tablet 0     Past Medical History  Past Medical History:   Diagnosis Date    Anxiety     Bleeding disorder     Blood clotting disorder     Cerebral infarction (H) 2015    Cognitive developmental delay     low IQ. 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 side (H)     right hand contractures    Iron overload due to repeated red blood cell transfusions     Migraines     Multiple subsegmental pulmonary emboli without acute cor pulmonale (H) 02/01/2021    Oppositional defiant behavior     Presence of intrauterine contraceptive device 2/18/2020    Superficial venous thrombosis of arm, right 03/25/2021    Uncomplicated asthma      Past Surgical History:   Procedure Laterality Date    AS INSERT TUNNELED CV 2 CATH W/O PORT/PUMP      CHOLECYSTECTOMY      CV RIGHT HEART CATH MEASUREMENTS RECORDED N/A 11/18/2021    Procedure: Right Heart Cath;  Surgeon: Jackson Stauffer MD;  Location:  HEART CARDIAC CATH LAB    INSERT PORT VASCULAR ACCESS Left 4/21/2021    Procedure:  INSERTION, VASCULAR ACCESS PORT (NOT SURE ON SIDE UNTIL REMOVAL);  Surgeon: Rajan More MD;  Location: [facility] OR    IR CHEST PORT PLACEMENT > 5 YRS OF AGE  4/21/2021    IR CVC NON TUNNEL LINE REMOVAL  5/6/2021    IR CVC NON TUNNEL PLACEMENT > 5 YRS  04/07/2020    IR CVC NON TUNNEL PLACEMENT > 5 YRS  4/30/2021    IR CVC NON TUNNEL PLACEMENT > 5 YRS  9/7/2022    IR PORT REMOVAL LEFT  4/21/2021    REMOVE PORT VASCULAR ACCESS Left 4/21/2021    Procedure: REMOVAL, VASCULAR ACCESS PORT LEFT;  Surgeon: Rajan More MD;  Location: [facility] OR    REPAIR TENDON ELBOW Right 10/02/2019    Procedure: Right Elbow Flexor Lengthening, Flexor Pronator Slide Of Wrist and Finger, Thumb Adductor Lengthening;  Surgeon: Anai Franco MD;  Location: [facility] OR    TONSILLECTOMY Bilateral 10/02/2019    Procedure: Bilateral Tonsillectomy;  Surgeon: Farhana Guy MD;  Location: [facility] OR    ZZC BREAST REDUCTION (INCLUDES LIPO) TIER 3 Bilateral 04/23/2019     Allergies   Allergen Reactions    Contrast Dye Angioedema     Hives and breathing issues    Fish-Derived Products Hives    Seafood Hives    Adhesive Tape Hives     Primipore dressing causes hives    Gadolinium     Iodinated Contrast Media      Social History   Social History     Tobacco Use    Smoking status: Never     Passive exposure: Never    Smokeless tobacco: Never   Substance Use Topics    Alcohol use: Not Currently     Alcohol/week: 0.0 standard drinks of alcohol    Drug use: Never   Past medical history and social history were reviewed.    Physical Examination:  /75 (BP Location: Left arm, Patient Position: Sitting, Cuff Size: Adult Regular)   Pulse 99   Temp 98.4  F (36.9  C) (Oral)   Resp 16   LMP  (LMP Unknown)   SpO2 98%      Wt Readings from Last 10 Encounters:   01/02/25 71.6 kg (157 lb 12.8 oz)   12/27/24 68 kg (150 lb)   12/20/24 73.2 kg (161 lb 4.8 oz)   12/16/24 70.3 kg (155 lb)   12/13/24 70.3 kg (155 lb)   12/07/24 72.2 kg (159 lb 1.6 oz)    11/27/24 70.8 kg (156 lb)   11/22/24 72 kg (158 lb 11.2 oz)   11/20/24 75.2 kg (165 lb 11.2 oz)   11/15/24 74.8 kg (165 lb)     General: Pleasant female, NAD  Eyes: EOMI, PERRL  ENT: oral mucosa moist, pink.  No tonsillar exudate  Respiratory: normal respiratory effort  Ext: no peripheral edema  Neurologic: chronic contracture of right arm and wrist. Steady gait. A/O x 4.  Skin: No rashes, petechiae, or bruising noted on exposed skin.   Laboratory Data:  Recent Results (from the past 240 hours)   Comprehensive metabolic panel    Collection Time: 01/08/25  9:36 AM   Result Value Ref Range    Sodium 140 135 - 145 mmol/L    Potassium 4.1 3.4 - 5.3 mmol/L    Carbon Dioxide (CO2) 21 (L) 22 - 29 mmol/L    Anion Gap 14 7 - 15 mmol/L    Urea Nitrogen 9.1 6.0 - 20.0 mg/dL    Creatinine 0.50 (L) 0.51 - 0.95 mg/dL    GFR Estimate >90 >60 mL/min/1.73m2    Calcium 9.1 8.8 - 10.4 mg/dL    Chloride 105 98 - 107 mmol/L    Glucose 109 (H) 70 - 99 mg/dL    Alkaline Phosphatase 74 40 - 150 U/L    AST 45 0 - 45 U/L    ALT 32 0 - 50 U/L    Protein Total 8.2 6.4 - 8.3 g/dL    Albumin 4.6 3.5 - 5.2 g/dL    Bilirubin Total 2.6 (H) <=1.2 mg/dL   Reticulocyte count    Collection Time: 01/08/25  9:36 AM   Result Value Ref Range    % Reticulocyte 16.9 (H) 0.5 - 2.0 %    Absolute Reticulocyte 0.383 (H) 0.025 - 0.095 10e6/uL   CBC with platelets and differential    Collection Time: 01/08/25  9:36 AM   Result Value Ref Range    WBC Count 20.0 (H) 4.0 - 11.0 10e3/uL    RBC Count 2.33 (L) 3.80 - 5.20 10e6/uL    Hemoglobin 7.0 (L) 11.7 - 15.7 g/dL    Hematocrit 19.9 (L) 35.0 - 47.0 %    MCV 85 78 - 100 fL    MCH 30.0 26.5 - 33.0 pg    MCHC 35.2 31.5 - 36.5 g/dL    RDW 21.9 (H) 10.0 - 15.0 %    Platelet Count 448 150 - 450 10e3/uL    % Neutrophils 80 %    % Lymphocytes 11 %    % Monocytes 6 %    % Eosinophils 1 %    % Basophils 1 %    % Immature Granulocytes 0 %    NRBCs per 100 WBC 1 (H) <1 /100    Absolute Neutrophils 16.0 (H) 1.6 - 8.3 10e3/uL     Absolute Lymphocytes 2.2 0.8 - 5.3 10e3/uL    Absolute Monocytes 1.3 0.0 - 1.3 10e3/uL    Absolute Eosinophils 0.3 0.0 - 0.7 10e3/uL    Absolute Basophils 0.1 0.0 - 0.2 10e3/uL    Absolute Immature Granulocytes 0.1 <=0.4 10e3/uL    Absolute NRBCs 0.3 10e3/uL   CBC with platelets and differential    Collection Time: 01/10/25  7:56 AM   Result Value Ref Range    WBC Count 14.3 (H) 4.0 - 11.0 10e3/uL    RBC Count 2.29 (L) 3.80 - 5.20 10e6/uL    Hemoglobin 6.7 (LL) 11.7 - 15.7 g/dL    Hematocrit 19.0 (L) 35.0 - 47.0 %    MCV 83 78 - 100 fL    MCH 29.3 26.5 - 33.0 pg    MCHC 35.3 31.5 - 36.5 g/dL    RDW 22.3 (H) 10.0 - 15.0 %    Platelet Count 504 (H) 150 - 450 10e3/uL    % Neutrophils 69 %    % Lymphocytes 19 %    % Monocytes 8 %    % Eosinophils 3 %    % Basophils 1 %    % Immature Granulocytes 0 %    NRBCs per 100 WBC 2 (H) <1 /100    Absolute Neutrophils 9.9 (H) 1.6 - 8.3 10e3/uL    Absolute Lymphocytes 2.8 0.8 - 5.3 10e3/uL    Absolute Monocytes 1.1 0.0 - 1.3 10e3/uL    Absolute Eosinophils 0.4 0.0 - 0.7 10e3/uL    Absolute Basophils 0.2 0.0 - 0.2 10e3/uL    Absolute Immature Granulocytes 0.1 <=0.4 10e3/uL    Absolute NRBCs 0.3 10e3/uL   Adult Type and Screen    Collection Time: 01/10/25  7:56 AM   Result Value Ref Range    ABO/RH(D) O POS     Antibody Screen Negative Negative    SPECIMEN EXPIRATION DATE [id number]    Prepare red blood cells (unit)    Collection Time: 01/10/25  9:21 AM   Result Value Ref Range    Blood Component Type Red Blood Cells     Product Code R4199I96     Unit Status Transfused     Unit Number [id number]     CROSSMATCH Compatible     CODING SYSTEM [id number]     ISSUE DATE AND TIME [id number]     UNIT ABO/RH O+     UNIT TYPE ISBT 5100    Comprehensive metabolic panel    Collection Time: 01/13/25  1:47 PM   Result Value Ref Range    Sodium 141 135 - 145 mmol/L    Potassium 3.7 3.4 - 5.3 mmol/L    Carbon Dioxide (CO2) 25 22 - 29 mmol/L    Anion Gap 9 7 - 15 mmol/L    Urea Nitrogen  7.8 6.0 - 20.0 mg/dL    Creatinine 0.43 (L) 0.51 - 0.95 mg/dL    GFR Estimate >90 >60 mL/min/1.73m2    Calcium 9.3 8.8 - 10.4 mg/dL    Chloride 107 98 - 107 mmol/L    Glucose 106 (H) 70 - 99 mg/dL    Alkaline Phosphatase 72 40 - 150 U/L    AST 45 0 - 45 U/L    ALT 38 0 - 50 U/L    Protein Total 8.3 6.4 - 8.3 g/dL    Albumin 4.6 3.5 - 5.2 g/dL    Bilirubin Total 2.0 (H) <=1.2 mg/dL   Reticulocyte count    Collection Time: 01/13/25  1:47 PM   Result Value Ref Range    % Reticulocyte 17.1 (H) 0.5 - 2.0 %    Absolute Reticulocyte 0.498 (H) 0.025 - 0.095 10e6/uL   CBC with platelets and differential    Collection Time: 01/13/25  1:47 PM   Result Value Ref Range    WBC Count 12.1 (H) 4.0 - 11.0 10e3/uL    RBC Count 2.91 (L) 3.80 - 5.20 10e6/uL    Hemoglobin 8.5 (L) 11.7 - 15.7 g/dL    Hematocrit 24.5 (L) 35.0 - 47.0 %    MCV 84 78 - 100 fL    MCH 29.2 26.5 - 33.0 pg    MCHC 34.7 31.5 - 36.5 g/dL    RDW 21.2 (H) 10.0 - 15.0 %    Platelet Count 617 (H) 150 - 450 10e3/uL    % Neutrophils 72 %    % Lymphocytes 15 %    % Monocytes 8 %    % Eosinophils 3 %    % Basophils 2 %    % Immature Granulocytes 1 %    NRBCs per 100 WBC 2 (H) <1 /100    Absolute Neutrophils 8.7 (H) 1.6 - 8.3 10e3/uL    Absolute Lymphocytes 1.9 0.8 - 5.3 10e3/uL    Absolute Monocytes 1.0 0.0 - 1.3 10e3/uL    Absolute Eosinophils 0.3 0.0 - 0.7 10e3/uL    Absolute Basophils 0.2 0.0 - 0.2 10e3/uL    Absolute Immature Granulocytes 0.1 <=0.4 10e3/uL    Absolute NRBCs 0.2 10e3/uL   RBC and Platelet Morphology    Collection Time: 01/13/25  1:47 PM   Result Value Ref Range    RBC Morphology Confirmed RBC Indices     Platelet Assessment  Automated Count Confirmed. Platelet morphology is normal.     Automated Count Confirmed. Platelet morphology is normal.    Sickle Cells Moderate (A) None Seen    Target Cells Moderate (A) None Seen     I reviewed the above labs today.    Assessment and Plan:  1. Sickle Cell HgbSS Disease  2. Acute pain crises  3. Chronic Pain  4.  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 History of CVA  7. Hearing loss  8. Nausea/vomiting       Jennifer is seen today for routine follow-up. She has had two hospital admissions late last year, one in late November and most recently 12/6-12/10 for uncomplicated pain crisis. So far in 2025 she has not been to the ED although may go today as there is no room in infusion for fluids and pain medication. She remains on oxycodone at home and is currently receiving 12 tablets per week, a slight increase from 10 tabs/week last year.     With her decreased utilization of the ED I recommended we do not make any changes in her home oxycodone prescriptions at this time which she is in agreement with.     Deferiprone was added back to her iron chelation regimen. She had been off of this for a few months due to apparent confusion with the pharmacy over taking dual treatment with Jadenu. She is scheduled for a Ferriscan 11/30/25.       -------------------------------------  Plan:  -Continue Hydrea to 3000mg daily to help lessen frequency of sickle cell pain.   -Continue monthly crizanlizumab infusions  -Continue to reassess plan for home oxycodone use.  At this time we will continue prescriptions for 12 tablets per week.  We can continue to decrease weekly if she desires.  The ultimate goal would be to avoid any dose or quantity escalations.  -She can self-reduce infusion days/week and we will continue to keep the cap at 2/week for now. I did make an exception to this last week to allow for a third infusion when she was here receiving a blood transfusion in an effort to avoid going to the ED.  -Continue infusion center visits limited to two times per week (Mondays and Fridays ideally although still needs to call to request). Continue diligent home management with current medications, heat, rest, compression, warm baths.   -If unable to manage at home can go to ED  with  continued plan to use IV Dilaudid and take a break from ketamine (10/2/23). No PCA use and goal for any admission would still be to discharge by 5 days or less  -EGD for evaluation of ongoing n/v and abdominal pain, now rescheduled multiple times due to issues with transportation.  Will request rescheduling and can inquire about hospital admission following procedure although unlikely.  Of note she has missed abdominal imaging as ordered by Dr. Case and has declined H. pylori testing as recommended previously.  -Continue metoclopramide 5 mg TID PRN if this continues to be helpful   - Continue Jadenu for iron overload and deferiprone. Due for Ferriscan which was canceled in November 2024, now scheduled 1/30/25.  -Continue aspirin BID  -Due at some point for Meningitis B booster.  -RTC with me in 2 weeks to coordinate with sidney Mantilla CNP  ---  40 minutes spent on the date of the encounter doing chart review, review of test results, interpretation of tests, patient visit, and documentation.    The longitudinal plan of care for the diagnosis(es)/condition(s) as documented were addressed during this visit. Due to the added complexity in care, I will continue to support Jennifer in the subsequent management and with ongoing continuity of care.                    Again, thank you for allowing me to participate in the care of your patient.        Sincerely,        Patricia Mantilla CNP    Electronically signed

## 2025-01-13 NOTE — TELEPHONE ENCOUNTER
Pt stating she is open to going to other infusion center locations if no openings at [facility]. Pt states she does have appt with provider this afternoon at 1330, so it would have to be after her appt with the provider. Discussed with pt her appt with the provider is late in the day and there likely will not be available time with infusion centers at that time. Pt stating she would like to stay on wait list for [facility] and see if an opening becomes available.

## 2025-01-14 ENCOUNTER — PATIENT OUTREACH (OUTPATIENT)
Dept: CARE COORDINATION | Facility: CLINIC | Age: 25
End: 2025-01-14
Payer: COMMERCIAL

## 2025-01-14 ENCOUNTER — INFUSION THERAPY VISIT (OUTPATIENT)
Dept: INFUSION THERAPY | Facility: CLINIC | Age: 25
End: 2025-01-14
Attending: PEDIATRICS
Payer: COMMERCIAL

## 2025-01-14 ENCOUNTER — NURSE TRIAGE (OUTPATIENT)
Dept: ONCOLOGY | Facility: CLINIC | Age: 25
End: 2025-01-14
Payer: COMMERCIAL

## 2025-01-14 VITALS
OXYGEN SATURATION: 97 % | SYSTOLIC BLOOD PRESSURE: 107 MMHG | DIASTOLIC BLOOD PRESSURE: 67 MMHG | TEMPERATURE: 97.7 F | RESPIRATION RATE: 16 BRPM | HEART RATE: 86 BPM

## 2025-01-14 DIAGNOSIS — D57.00 SICKLE CELL PAIN CRISIS (H): Primary | ICD-10-CM

## 2025-01-14 DIAGNOSIS — G81.10 SPASTIC HEMIPLEGIA, UNSPECIFIED ETIOLOGY, UNSPECIFIED LATERALITY (H): ICD-10-CM

## 2025-01-14 PROCEDURE — 250N000011 HC RX IP 250 OP 636: Performed by: PEDIATRICS

## 2025-01-14 PROCEDURE — 96361 HYDRATE IV INFUSION ADD-ON: CPT

## 2025-01-14 PROCEDURE — 96374 THER/PROPH/DIAG INJ IV PUSH: CPT

## 2025-01-14 PROCEDURE — 250N000013 HC RX MED GY IP 250 OP 250 PS 637: Performed by: PEDIATRICS

## 2025-01-14 PROCEDURE — 96376 TX/PRO/DX INJ SAME DRUG ADON: CPT

## 2025-01-14 PROCEDURE — 96375 TX/PRO/DX INJ NEW DRUG ADDON: CPT

## 2025-01-14 PROCEDURE — 258N000003 HC RX IP 258 OP 636: Performed by: PEDIATRICS

## 2025-01-14 RX ORDER — KETOROLAC TROMETHAMINE 30 MG/ML
30 INJECTION, SOLUTION INTRAMUSCULAR; INTRAVENOUS ONCE
Status: COMPLETED | OUTPATIENT
Start: 2025-01-14 | End: 2025-01-14

## 2025-01-14 RX ORDER — ONDANSETRON 2 MG/ML
8 INJECTION INTRAMUSCULAR; INTRAVENOUS EVERY 6 HOURS PRN
Status: DISCONTINUED | OUTPATIENT
Start: 2025-01-14 | End: 2025-01-14 | Stop reason: HOSPADM

## 2025-01-14 RX ORDER — ONDANSETRON 8 MG/1
8 TABLET, FILM COATED ORAL
Start: 2025-04-01

## 2025-01-14 RX ORDER — KETOROLAC TROMETHAMINE 30 MG/ML
30 INJECTION, SOLUTION INTRAMUSCULAR; INTRAVENOUS ONCE
Status: CANCELLED
Start: 2025-04-01 | End: 2025-04-01

## 2025-01-14 RX ORDER — HEPARIN SODIUM (PORCINE) LOCK FLUSH IV SOLN 100 UNIT/ML 100 UNIT/ML
5 SOLUTION INTRAVENOUS
OUTPATIENT
Start: 2025-04-01

## 2025-01-14 RX ORDER — DIPHENHYDRAMINE HCL 25 MG
50 CAPSULE ORAL
Status: CANCELLED
Start: 2025-04-01

## 2025-01-14 RX ORDER — HEPARIN SODIUM (PORCINE) LOCK FLUSH IV SOLN 100 UNIT/ML 100 UNIT/ML
5 SOLUTION INTRAVENOUS
Status: DISCONTINUED | OUTPATIENT
Start: 2025-01-14 | End: 2025-01-14 | Stop reason: HOSPADM

## 2025-01-14 RX ORDER — DIPHENHYDRAMINE HCL 25 MG
50 CAPSULE ORAL
Status: COMPLETED | OUTPATIENT
Start: 2025-01-14 | End: 2025-01-14

## 2025-01-14 RX ORDER — ONDANSETRON 2 MG/ML
8 INJECTION INTRAMUSCULAR; INTRAVENOUS EVERY 6 HOURS PRN
Status: CANCELLED
Start: 2025-04-01

## 2025-01-14 RX ORDER — HEPARIN SODIUM,PORCINE 10 UNIT/ML
5 VIAL (ML) INTRAVENOUS
OUTPATIENT
Start: 2025-04-01

## 2025-01-14 RX ADMIN — KETOROLAC TROMETHAMINE 30 MG: 30 INJECTION, SOLUTION INTRAMUSCULAR at 13:36

## 2025-01-14 RX ADMIN — HYDROMORPHONE HYDROCHLORIDE 1 MG: 1 INJECTION, SOLUTION INTRAMUSCULAR; INTRAVENOUS; SUBCUTANEOUS at 15:07

## 2025-01-14 RX ADMIN — SODIUM CHLORIDE, POTASSIUM CHLORIDE, SODIUM LACTATE AND CALCIUM CHLORIDE 1000 ML: 600; 310; 30; 20 INJECTION, SOLUTION INTRAVENOUS at 13:02

## 2025-01-14 RX ADMIN — HYDROMORPHONE HYDROCHLORIDE 1 MG: 1 INJECTION, SOLUTION INTRAMUSCULAR; INTRAVENOUS; SUBCUTANEOUS at 13:13

## 2025-01-14 RX ADMIN — ONDANSETRON 8 MG: 2 INJECTION INTRAMUSCULAR; INTRAVENOUS at 13:17

## 2025-01-14 RX ADMIN — DIPHENHYDRAMINE HYDROCHLORIDE 50 MG: 25 CAPSULE ORAL at 13:12

## 2025-01-14 RX ADMIN — HEPARIN SODIUM (PORCINE) LOCK FLUSH IV SOLN 100 UNIT/ML 5 ML: 100 SOLUTION at 15:19

## 2025-01-14 RX ADMIN — HYDROMORPHONE HYDROCHLORIDE 1 MG: 1 INJECTION, SOLUTION INTRAMUSCULAR; INTRAVENOUS; SUBCUTANEOUS at 14:12

## 2025-01-14 NOTE — PROGRESS NOTES
Infusion Nursing Note:  Jennifer Cervantes presents today for Fluids and pain meds.    Patient seen by provider today: No   present during visit today: Not Applicable.    Note: See MAR for meds.      Intravenous Access:  Implanted Port.    Treatment Conditions:  Not Applicable.      Post Infusion Assessment:  Patient tolerated infusion without incident.  Access discontinued per protocol.       Discharge Plan:   Patient and/or family verbalized understanding of discharge instructions and all questions answered.  Patient discharged in stable condition accompanied by: self.  Departure Mode: Ambulatory.      PRASANNA TRONCOSO RN

## 2025-01-14 NOTE — TELEPHONE ENCOUNTER
"Oncology Nurse Triage - Sickle Cell Pain Crisis:    Situation: Jennifer  calling about Sickle Cell Pain Crisis, requesting to be added on for IV fluids and pain medicine. She went to the ED yesterday, but did not receive any pain meds or fluids and left d/t wait time.      Background:   Patient's last infusion was 1/10/25  Last clinic visit date:1/13/25 w/ Patricia Mantilla  Does patient have active treatment plan? Yes     Assessment of Symptoms:  Onset/Duration of symptoms: 3 days     Is it typical sickle cell pain? Yes  Location: arms and legs  Character: Sharp  Intensity: 9/10     Any radiation of pain, numbness, tingling, weakness, warmth, swelling, discoloration of arms or legs?No     Fever? No     Chest Pain Present: No     Shortness of breath: No     Other home therapies tried: HEAT/HEATING PAD and WARM BATH      Last home medication taken and when: 10pm last night Oxycodone     Any Refills Needed?: No     Does patient have transportation & length of time to get to clinic: Yes- 20 minuets away and can get herself in if early appt opens and then would need transportation homes. Would like transportation if later appts.      Recommendations:       Added to infusion wait list.     0917 call from pt to check status of infusion/wondering if other sites have openings.   0919 call to [facility], spoke aida/ Ethan, who could take pt at 1pm today. Ethan scheduled appt.   0923 call to pt, who states she will take [place] appt. She said she \"does not mean to be a bother\", said she has been trying to hold off on going to the ED and knows there are many other sick patients and staff. She is appreciative of help.   0927 message sent to  with request to re-route cab ride from [place] for 1015 to [facility] for 1pm arrival. Pt said she had arranged transport in event she had to go to ED.   "

## 2025-01-14 NOTE — PROGRESS NOTES
Ambulatory Care Management- Transportation Support  [facility]     Data/Intervention:  Patient Name:    Jennifer Cervantes     /Age: 2000 (24 year old)     CCRC team member assisting Specialty SW with transportation request.      Assessment:  CHW/CTA called [facility] "[employer] to arrange medical appointment transportation in conjunction with patient's insurance.  time 12:00pm    Taxi company: [employer]    Patient will need to call above taxi company at phone number: [phone] when ready for return ride home.      Plan:  Patient is aware of the transportation plan.  available to assist with any other needs.      Maritza Vick MA

## 2025-01-15 DIAGNOSIS — D57.00 SICKLE CELL DISEASE WITH CRISIS (H): ICD-10-CM

## 2025-01-15 RX ORDER — OXYCODONE HYDROCHLORIDE 10 MG/1
10 TABLET ORAL EVERY 6 HOURS PRN
Qty: 12 TABLET | Refills: 0 | Status: SHIPPED | OUTPATIENT
Start: 2025-01-15

## 2025-01-15 NOTE — TELEPHONE ENCOUNTER
Narcotic Refill Request    Medication(s) requested:  oxycodone   Person Requesting Refill:Jennifer   What pain is the medication treating: sickle cell pain   How is the medication being taken?:tries to take 2 tabs per day 1 in AM and one at night   Does pt have enough for today? Has enough for today   Is pain being adequately controlled on the current regimen?: yes   Experiencing any side effects from medication?: None     Date of most recent appointment:  1/13/25 Patricia Johnson   Any No Show Visits:No   Next appointment:   2/27/25 Patricia Johnson   Last fill date and by whom:  1/9/25 Patricia johnson    Reviewed: No Access     Send to provider: Patricia Johnson and Arely Krueger

## 2025-01-16 ENCOUNTER — PATIENT OUTREACH (OUTPATIENT)
Dept: CARE COORDINATION | Facility: CLINIC | Age: 25
End: 2025-01-16
Payer: COMMERCIAL

## 2025-01-16 ENCOUNTER — INFUSION THERAPY VISIT (OUTPATIENT)
Dept: TRANSPLANT | Facility: CLINIC | Age: 25
End: 2025-01-16
Attending: PEDIATRICS
Payer: COMMERCIAL

## 2025-01-16 ENCOUNTER — NURSE TRIAGE (OUTPATIENT)
Dept: ONCOLOGY | Facility: CLINIC | Age: 25
End: 2025-01-16
Payer: COMMERCIAL

## 2025-01-16 VITALS
SYSTOLIC BLOOD PRESSURE: 121 MMHG | OXYGEN SATURATION: 98 % | RESPIRATION RATE: 18 BRPM | DIASTOLIC BLOOD PRESSURE: 82 MMHG | HEART RATE: 99 BPM

## 2025-01-16 DIAGNOSIS — D57.00 SICKLE CELL PAIN CRISIS (H): Primary | ICD-10-CM

## 2025-01-16 DIAGNOSIS — G81.10 SPASTIC HEMIPLEGIA, UNSPECIFIED ETIOLOGY, UNSPECIFIED LATERALITY (H): ICD-10-CM

## 2025-01-16 PROCEDURE — 250N000013 HC RX MED GY IP 250 OP 250 PS 637: Performed by: PEDIATRICS

## 2025-01-16 PROCEDURE — 250N000011 HC RX IP 250 OP 636: Performed by: PEDIATRICS

## 2025-01-16 PROCEDURE — 258N000003 HC RX IP 258 OP 636: Performed by: PEDIATRICS

## 2025-01-16 RX ORDER — KETOROLAC TROMETHAMINE 30 MG/ML
30 INJECTION, SOLUTION INTRAMUSCULAR; INTRAVENOUS ONCE
Start: 2025-04-01 | End: 2025-04-01

## 2025-01-16 RX ORDER — HEPARIN SODIUM (PORCINE) LOCK FLUSH IV SOLN 100 UNIT/ML 100 UNIT/ML
5 SOLUTION INTRAVENOUS
OUTPATIENT
Start: 2025-04-01

## 2025-01-16 RX ORDER — HEPARIN SODIUM,PORCINE 10 UNIT/ML
5 VIAL (ML) INTRAVENOUS
OUTPATIENT
Start: 2025-04-01

## 2025-01-16 RX ORDER — ONDANSETRON 4 MG/1
8 TABLET, FILM COATED ORAL
Start: 2025-04-01

## 2025-01-16 RX ORDER — ONDANSETRON 2 MG/ML
8 INJECTION INTRAMUSCULAR; INTRAVENOUS EVERY 6 HOURS PRN
Start: 2025-04-01

## 2025-01-16 RX ORDER — ONDANSETRON 2 MG/ML
8 INJECTION INTRAMUSCULAR; INTRAVENOUS EVERY 6 HOURS PRN
Status: DISCONTINUED | OUTPATIENT
Start: 2025-01-16 | End: 2025-01-16 | Stop reason: HOSPADM

## 2025-01-16 RX ORDER — KETOROLAC TROMETHAMINE 30 MG/ML
30 INJECTION, SOLUTION INTRAMUSCULAR; INTRAVENOUS ONCE
Status: COMPLETED | OUTPATIENT
Start: 2025-01-16 | End: 2025-01-16

## 2025-01-16 RX ORDER — DIPHENHYDRAMINE HCL 25 MG
50 CAPSULE ORAL
Start: 2025-04-01

## 2025-01-16 RX ORDER — DIPHENHYDRAMINE HCL 25 MG
50 CAPSULE ORAL
Status: COMPLETED | OUTPATIENT
Start: 2025-01-16 | End: 2025-01-16

## 2025-01-16 RX ORDER — HEPARIN SODIUM (PORCINE) LOCK FLUSH IV SOLN 100 UNIT/ML 100 UNIT/ML
5 SOLUTION INTRAVENOUS ONCE
Status: COMPLETED | OUTPATIENT
Start: 2025-01-16 | End: 2025-01-16

## 2025-01-16 RX ADMIN — DIPHENHYDRAMINE HYDROCHLORIDE 50 MG: 25 CAPSULE ORAL at 08:37

## 2025-01-16 RX ADMIN — HYDROMORPHONE HYDROCHLORIDE 1 MG: 1 INJECTION, SOLUTION INTRAMUSCULAR; INTRAVENOUS; SUBCUTANEOUS at 10:42

## 2025-01-16 RX ADMIN — ONDANSETRON 8 MG: 2 INJECTION INTRAMUSCULAR; INTRAVENOUS at 08:37

## 2025-01-16 RX ADMIN — SODIUM CHLORIDE, POTASSIUM CHLORIDE, SODIUM LACTATE AND CALCIUM CHLORIDE 1000 ML: 600; 310; 30; 20 INJECTION, SOLUTION INTRAVENOUS at 08:49

## 2025-01-16 RX ADMIN — KETOROLAC TROMETHAMINE 30 MG: 30 INJECTION, SOLUTION INTRAMUSCULAR; INTRAVENOUS at 08:38

## 2025-01-16 RX ADMIN — HYDROMORPHONE HYDROCHLORIDE 1 MG: 1 INJECTION, SOLUTION INTRAMUSCULAR; INTRAVENOUS; SUBCUTANEOUS at 09:42

## 2025-01-16 RX ADMIN — HYDROMORPHONE HYDROCHLORIDE 1 MG: 1 INJECTION, SOLUTION INTRAMUSCULAR; INTRAVENOUS; SUBCUTANEOUS at 08:38

## 2025-01-16 RX ADMIN — Medication 5 ML: at 14:24

## 2025-01-16 ASSESSMENT — PAIN SCALES - GENERAL: PAINLEVEL_OUTOF10: EXTREME PAIN (8)

## 2025-01-16 NOTE — TELEPHONE ENCOUNTER
Oncology Nurse Triage - Sickle Cell Pain Crisis:    Situation: Jennifer  calling about Sickle Cell Pain Crisis, requesting to be added on for IV fluids and pain medicine    Background:   Patient's last infusion was 1/14/25  Last clinic visit date: 1/13/25 w/ Patricia Mantilla   Does patient have active treatment plan? Yes    Assessment of Symptoms:  Onset/Duration of symptoms: 2 days    Is it typical sickle cell pain? Yes  Location: bilateral arms and legs  Character: Sharp  Intensity: 8/10    Any radiation of pain, numbness, tingling, weakness, warmth, swelling, discoloration of arms or legs?No    Fever? No    Chest Pain Present: No    Shortness of breath: No    Other home therapies tried: HEAT/HEATING PAD and WARM BATH     Last home medication taken and when: 10pm Oxycodone and IBU    Any Refills Needed?: No    Does patient have transportation & length of time to get to clinic: No    Recommendations:   Added to infusion wait list- open to going to [facility].   [phone] call to pt to offer 8am infusion appt in BMT- pt said she may be about 5 minutes late, but is going to head to clinic now. She will need assistance with a ride home from clinic.   [phone] message sent to scheduling.   [phone] message sent to SW to help arrange transportation home after appt.

## 2025-01-16 NOTE — PROGRESS NOTES
Ambulatory Care Management- Transportation Support  Specialty SW - [facility]     Data/Intervention:  Patient Name:    Jennifer Cervantes     /Age: 2000 (24 year old)     [facility] team member assisting Specialty SW with transportation request.      Assessment:  CHW/CTA called [employer] to arrange medical appointment transportation in conjunction with patient's insurance. CTA was informed by the  rep (samantha) that the patient had already set up a ride yesterday.    CTA called Jennifer to confirm this. Jennifer said she was sorry she had forgotten she had done it.       Plan:  Patient is aware of the transportation plan.  available to assist with any other needs.      Lisandra Kilgore MA

## 2025-01-16 NOTE — PROGRESS NOTES
Infusion Nursing Note:  Jennifer Cervantes presents today for add-on infusion.    Patient seen by provider today: No   present during visit today: Not Applicable.    Note: Patiwent received dilaudid x3, toradol, zofran, benadryl and 1 L LR bolus for 8/10 arm and leg pain with some relief. Patient was monitored for 30 minutes post pain medication; patient stable upon discharge.      Intravenous Access:  Implanted Port.    Treatment Conditions:  Results reviewed, labs MET treatment parameters, ok to proceed with treatment.      Post Infusion Assessment:  Patient tolerated infusion without incident.       Discharge Plan:   Patient and/or family verbalized understanding of discharge instructions and all questions answered.      Vicenta Boone RN

## 2025-01-17 ENCOUNTER — APPOINTMENT (OUTPATIENT)
Dept: GENERAL RADIOLOGY | Facility: CLINIC | Age: 25
End: 2025-01-17
Payer: COMMERCIAL

## 2025-01-17 ENCOUNTER — HOSPITAL ENCOUNTER (EMERGENCY)
Facility: CLINIC | Age: 25
Discharge: HOME | End: 2025-01-18
Attending: FAMILY MEDICINE | Admitting: FAMILY MEDICINE
Payer: COMMERCIAL

## 2025-01-17 ENCOUNTER — RESULTS ONLY (OUTPATIENT)
Dept: ADMINISTRATIVE | Facility: CLINIC | Age: 25
End: 2025-01-17

## 2025-01-17 DIAGNOSIS — D57.00 SICKLE CELL PAIN CRISIS (H): ICD-10-CM

## 2025-01-17 LAB
ALBUMIN SERPL BCG-MCNC: 4.3 G/DL (ref 3.5–5.2)
ALP SERPL-CCNC: 64 U/L (ref 40–150)
ALT SERPL W P-5'-P-CCNC: 24 U/L (ref 0–50)
ANION GAP SERPL CALCULATED.3IONS-SCNC: 11 MMOL/L (ref 7–15)
AST SERPL W P-5'-P-CCNC: 45 U/L (ref 0–45)
BASOPHILS # BLD AUTO: 0.2 10E3/UL (ref 0–0.2)
BASOPHILS NFR BLD AUTO: 2 %
BILIRUB SERPL-MCNC: 1.8 MG/DL
BUN SERPL-MCNC: 7.9 MG/DL (ref 6–20)
CALCIUM SERPL-MCNC: 9 MG/DL (ref 8.8–10.4)
CHLORIDE SERPL-SCNC: 104 MMOL/L (ref 98–107)
CREAT SERPL-MCNC: 0.52 MG/DL (ref 0.51–0.95)
EGFRCR SERPLBLD CKD-EPI 2021: >90 ML/MIN/1.73M2
EOSINOPHIL # BLD AUTO: 0.4 10E3/UL (ref 0–0.7)
EOSINOPHIL NFR BLD AUTO: 3 %
ERYTHROCYTE [DISTWIDTH] IN BLOOD BY AUTOMATED COUNT: 21.6 % (ref 10–15)
GLUCOSE SERPL-MCNC: 98 MG/DL (ref 70–99)
HCO3 SERPL-SCNC: 25 MMOL/L (ref 22–29)
HCT VFR BLD AUTO: 21.2 % (ref 35–47)
HGB BLD-MCNC: 7.3 G/DL (ref 11.7–15.7)
IMM GRANULOCYTES # BLD: 0.1 10E3/UL
IMM GRANULOCYTES NFR BLD: 1 %
LYMPHOCYTES # BLD AUTO: 2.7 10E3/UL (ref 0.8–5.3)
LYMPHOCYTES NFR BLD AUTO: 20 %
MCH RBC QN AUTO: 28.4 PG (ref 26.5–33)
MCHC RBC AUTO-ENTMCNC: 34.4 G/DL (ref 31.5–36.5)
MCV RBC AUTO: 83 FL (ref 78–100)
MONOCYTES # BLD AUTO: 1 10E3/UL (ref 0–1.3)
MONOCYTES NFR BLD AUTO: 7 %
NEUTROPHILS # BLD AUTO: 9.2 10E3/UL (ref 1.6–8.3)
NEUTROPHILS NFR BLD AUTO: 68 %
NRBC # BLD AUTO: 0.1 10E3/UL
NRBC BLD AUTO-RTO: 1 /100
PLATELET # BLD AUTO: 596 10E3/UL (ref 150–450)
POTASSIUM SERPL-SCNC: 4.1 MMOL/L (ref 3.4–5.3)
PROT SERPL-MCNC: 7.6 G/DL (ref 6.4–8.3)
RBC # BLD AUTO: 2.57 10E6/UL (ref 3.8–5.2)
RETICS # AUTO: 0.24 10E6/UL (ref 0.03–0.1)
RETICS/RBC NFR AUTO: 9.1 % (ref 0.5–2)
SODIUM SERPL-SCNC: 140 MMOL/L (ref 135–145)
WBC # BLD AUTO: 13.5 10E3/UL (ref 4–11)

## 2025-01-17 PROCEDURE — 93005 ELECTROCARDIOGRAM TRACING: CPT | Performed by: FAMILY MEDICINE

## 2025-01-17 PROCEDURE — 99285 EMERGENCY DEPT VISIT HI MDM: CPT | Mod: 25 | Performed by: FAMILY MEDICINE

## 2025-01-17 PROCEDURE — 82040 ASSAY OF SERUM ALBUMIN: CPT

## 2025-01-17 PROCEDURE — 71046 X-RAY EXAM CHEST 2 VIEWS: CPT

## 2025-01-17 PROCEDURE — 85025 COMPLETE CBC W/AUTO DIFF WBC: CPT

## 2025-01-17 PROCEDURE — 93010 ELECTROCARDIOGRAM REPORT: CPT | Performed by: FAMILY MEDICINE

## 2025-01-17 PROCEDURE — 250N000011 HC RX IP 250 OP 636

## 2025-01-17 PROCEDURE — 258N000003 HC RX IP 258 OP 636

## 2025-01-17 PROCEDURE — 96375 TX/PRO/DX INJ NEW DRUG ADDON: CPT | Performed by: FAMILY MEDICINE

## 2025-01-17 PROCEDURE — 36415 COLL VENOUS BLD VENIPUNCTURE: CPT

## 2025-01-17 PROCEDURE — 85045 AUTOMATED RETICULOCYTE COUNT: CPT

## 2025-01-17 PROCEDURE — 96376 TX/PRO/DX INJ SAME DRUG ADON: CPT | Performed by: FAMILY MEDICINE

## 2025-01-17 PROCEDURE — 96374 THER/PROPH/DIAG INJ IV PUSH: CPT | Performed by: FAMILY MEDICINE

## 2025-01-17 PROCEDURE — 96361 HYDRATE IV INFUSION ADD-ON: CPT | Performed by: FAMILY MEDICINE

## 2025-01-17 PROCEDURE — 99284 EMERGENCY DEPT VISIT MOD MDM: CPT | Mod: GC | Performed by: FAMILY MEDICINE

## 2025-01-17 PROCEDURE — 250N000011 HC RX IP 250 OP 636: Performed by: FAMILY MEDICINE

## 2025-01-17 RX ORDER — ONDANSETRON 2 MG/ML
8 INJECTION INTRAMUSCULAR; INTRAVENOUS EVERY 30 MIN PRN
Status: DISCONTINUED | OUTPATIENT
Start: 2025-01-17 | End: 2025-01-17

## 2025-01-17 RX ORDER — KETOROLAC TROMETHAMINE 30 MG/ML
30 INJECTION, SOLUTION INTRAMUSCULAR; INTRAVENOUS ONCE
Status: COMPLETED | OUTPATIENT
Start: 2025-01-17 | End: 2025-01-17

## 2025-01-17 RX ORDER — ONDANSETRON 2 MG/ML
8 INJECTION INTRAMUSCULAR; INTRAVENOUS ONCE
Status: COMPLETED | OUTPATIENT
Start: 2025-01-17 | End: 2025-01-17

## 2025-01-17 RX ADMIN — SODIUM CHLORIDE, POTASSIUM CHLORIDE, SODIUM LACTATE AND CALCIUM CHLORIDE 1000 ML: 600; 310; 30; 20 INJECTION, SOLUTION INTRAVENOUS at 21:34

## 2025-01-17 RX ADMIN — HYDROMORPHONE HYDROCHLORIDE 1 MG: 1 INJECTION, SOLUTION INTRAMUSCULAR; INTRAVENOUS; SUBCUTANEOUS at 23:04

## 2025-01-17 RX ADMIN — ONDANSETRON 8 MG: 2 INJECTION INTRAMUSCULAR; INTRAVENOUS at 21:34

## 2025-01-17 RX ADMIN — ONDANSETRON 8 MG: 2 INJECTION INTRAMUSCULAR; INTRAVENOUS at 23:04

## 2025-01-17 RX ADMIN — KETOROLAC TROMETHAMINE 30 MG: 30 INJECTION, SOLUTION INTRAMUSCULAR at 21:35

## 2025-01-17 RX ADMIN — HYDROMORPHONE HYDROCHLORIDE 1 MG: 1 INJECTION, SOLUTION INTRAMUSCULAR; INTRAVENOUS; SUBCUTANEOUS at 21:34

## 2025-01-17 ASSESSMENT — ENCOUNTER SYMPTOMS: SICKLE CELL PAIN: 1

## 2025-01-17 ASSESSMENT — ACTIVITIES OF DAILY LIVING (ADL)
ADLS_ACUITY_SCORE: 58

## 2025-01-18 VITALS
WEIGHT: 157 LBS | HEIGHT: 64 IN | DIASTOLIC BLOOD PRESSURE: 83 MMHG | HEART RATE: 103 BPM | BODY MASS INDEX: 26.8 KG/M2 | SYSTOLIC BLOOD PRESSURE: 123 MMHG | TEMPERATURE: 98.6 F | OXYGEN SATURATION: 91 % | RESPIRATION RATE: 14 BRPM

## 2025-01-18 LAB
ATRIAL RATE - MUSE: 95 BPM
ATRIAL RATE - MUSE: 96 BPM
DIASTOLIC BLOOD PRESSURE - MUSE: NORMAL MMHG
DIASTOLIC BLOOD PRESSURE - MUSE: NORMAL MMHG
INTERPRETATION ECG - MUSE: NORMAL
INTERPRETATION ECG - MUSE: NORMAL
P AXIS - MUSE: 60 DEGREES
P AXIS - MUSE: 61 DEGREES
PR INTERVAL - MUSE: 148 MS
PR INTERVAL - MUSE: 148 MS
QRS DURATION - MUSE: 74 MS
QRS DURATION - MUSE: 74 MS
QT - MUSE: 372 MS
QT - MUSE: 376 MS
QTC - MUSE: 467 MS
QTC - MUSE: 475 MS
R AXIS - MUSE: 24 DEGREES
R AXIS - MUSE: 31 DEGREES
SYSTOLIC BLOOD PRESSURE - MUSE: NORMAL MMHG
SYSTOLIC BLOOD PRESSURE - MUSE: NORMAL MMHG
T AXIS - MUSE: 16 DEGREES
T AXIS - MUSE: 21 DEGREES
VENTRICULAR RATE- MUSE: 95 BPM
VENTRICULAR RATE- MUSE: 96 BPM

## 2025-01-18 PROCEDURE — 250N000011 HC RX IP 250 OP 636: Performed by: FAMILY MEDICINE

## 2025-01-18 PROCEDURE — 96376 TX/PRO/DX INJ SAME DRUG ADON: CPT | Performed by: FAMILY MEDICINE

## 2025-01-18 PROCEDURE — 250N000011 HC RX IP 250 OP 636

## 2025-01-18 RX ORDER — HEPARIN SODIUM (PORCINE) LOCK FLUSH IV SOLN 100 UNIT/ML 100 UNIT/ML
5-10 SOLUTION INTRAVENOUS
Status: DISCONTINUED | OUTPATIENT
Start: 2025-01-18 | End: 2025-01-18 | Stop reason: HOSPADM

## 2025-01-18 RX ADMIN — HYDROMORPHONE HYDROCHLORIDE 1 MG: 1 INJECTION, SOLUTION INTRAMUSCULAR; INTRAVENOUS; SUBCUTANEOUS at 00:29

## 2025-01-18 RX ADMIN — HEPARIN SODIUM (PORCINE) LOCK FLUSH IV SOLN 100 UNIT/ML 5 ML: 100 SOLUTION at 00:50

## 2025-01-18 ASSESSMENT — ACTIVITIES OF DAILY LIVING (ADL): ADLS_ACUITY_SCORE: 58

## 2025-01-18 NOTE — ED TRIAGE NOTES
Pt here for sickle cell pain, bilateral arms.  Pt states she took oxycodone (last take approx 1.5 hour prior to arrival)  and normal meds at home and provided no relief.

## 2025-01-18 NOTE — DISCHARGE INSTRUCTIONS
Discharge to home with plan to continue with current outpatient plan following up with your hematologist next week.  Return if marked increase in pain

## 2025-01-18 NOTE — ED PROVIDER NOTES
ED Provider Note  [facility]      History     Chief Complaint   Patient presents with    Sickle Cell Pain Crisis       Sickle Cell Pain Crisis    Jennifer Cervantes is a 24 year old female who presents with 1 day of worsening pain in bilateral upper extremities extending from upper arm down to fingers. The pain is a deep burning pain. It does not get worse with movement or touch. She also reports a burning sensation that has been going for weeks in RUQ but is mild. She has no other pain to report. Denied fever, chills, aches, swelling of hands and feet, hip pain, abdominal pain, nausea, vomiting, diarrhea, constipation, urinary symptoms, shortness of breath trouble breathing, chest pain, or sensory or motor deficits. She reports she already has her gallbladder removed. Patient attempted to use prn oxycodone for pain management at home but after using recommended amount pain persisted and patient came to ED to avoid excessive opiate use. She reports her last pain crisis was 2 weeks before the end of 2024. She has no further history to report.     Past Medical History  Past Medical History:   Diagnosis Date    Anxiety     Bleeding disorder     Blood clotting disorder     Cerebral infarction (H) 2015    Cognitive developmental delay     low IQ. 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 side (H)     right hand contractures    Iron overload due to repeated red blood cell transfusions     Migraines     Multiple subsegmental pulmonary emboli without acute cor pulmonale (H) 02/01/2021    Oppositional defiant behavior     Presence of intrauterine contraceptive device 2/18/2020    Superficial venous thrombosis of arm, right 03/25/2021    Uncomplicated asthma      Past Surgical History:   Procedure Laterality Date    AS INSERT TUNNELED CV 2 CATH W/O PORT/PUMP      CHOLECYSTECTOMY       CV RIGHT HEART CATH MEASUREMENTS RECORDED N/A 11/18/2021    Procedure: Right Heart Cath;  Surgeon: Jackson Stauffer MD;  Location:  HEART CARDIAC CATH LAB    INSERT PORT VASCULAR ACCESS Left 4/21/2021    Procedure: INSERTION, VASCULAR ACCESS PORT (NOT SURE ON SIDE UNTIL REMOVAL);  Surgeon: Rajan More MD;  Location: [facility] OR    IR CHEST PORT PLACEMENT > 5 YRS OF AGE  4/21/2021    IR CVC NON TUNNEL LINE REMOVAL  5/6/2021    IR CVC NON TUNNEL PLACEMENT > 5 YRS  04/07/2020    IR CVC NON TUNNEL PLACEMENT > 5 YRS  4/30/2021    IR CVC NON TUNNEL PLACEMENT > 5 YRS  9/7/2022    IR PORT REMOVAL LEFT  4/21/2021    REMOVE PORT VASCULAR ACCESS Left 4/21/2021    Procedure: REMOVAL, VASCULAR ACCESS PORT LEFT;  Surgeon: Rajan More MD;  Location: [facility] OR    REPAIR TENDON ELBOW Right 10/02/2019    Procedure: Right Elbow Flexor Lengthening, Flexor Pronator Slide Of Wrist and Finger, Thumb Adductor Lengthening;  Surgeon: Anai Franco MD;  Location: [facility] OR    TONSILLECTOMY Bilateral 10/02/2019    Procedure: Bilateral Tonsillectomy;  Surgeon: Farhana Guy MD;  Location: [facility] OR    ZZC BREAST REDUCTION (INCLUDES LIPO) TIER 3 Bilateral 04/23/2019     oxyCODONE IR (ROXICODONE) 10 MG tablet  albuterol (PROVENTIL) (2.5 MG/3ML) 0.083% neb solution  aspirin (ASA) 81 MG chewable tablet  budesonide-formoterol (SYMBICORT) 160-4.5 MCG/ACT Inhaler  deferasirox (JADENU) 360 MG tablet  Deferiprone, Twice Daily, 1000 MG TABS  diphenhydrAMINE (BENADRYL) 50 MG capsule  EPINEPHrine (ANY BX GENERIC EQUIV) 0.3 MG/0.3ML injection 2-pack  gabapentin (NEURONTIN) 300 MG capsule  hydroxyurea (HYDREA) 500 MG capsule  ibuprofen (ADVIL/MOTRIN) 800 MG tablet  methocarbamol (ROBAXIN) 750 MG tablet  methylPREDNISolone (MEDROL) 32 MG tablet  naloxone (NARCAN) 4 MG/0.1ML nasal spray      Allergies   Allergen Reactions    Contrast Dye Angioedema     Hives and breathing issues    Fish-Derived Products Hives    Seafood Hives    Adhesive  "Tape Hives     Primipore dressing causes hives    Gadolinium     Iodinated Contrast Media      Family History  Family History   Problem Relation Age of Onset    Sickle Cell Trait Mother     Hypertension Mother     Asthma Mother     Sickle Cell Trait Father     Glaucoma No family hx of     Macular Degeneration No family hx of     Diabetes No family hx of     Gout No family hx of      Social History   Social History     Tobacco Use    Smoking status: Never     Passive exposure: Never    Smokeless tobacco: Never   Substance Use Topics    Alcohol use: Not Currently     Alcohol/week: 0.0 standard drinks of alcohol    Drug use: Never      A medically appropriate review of systems was performed with pertinent positives and negatives noted in the HPI, and all other systems negative.    Physical Exam   BP: 115/75  Pulse: 103  Temp: 98.6  F (37  C)  Resp: 14  Height: 162.6 cm (5' 4\")  Weight: 71.2 kg (157 lb)  SpO2: 95 %  Physical Exam  Constitutional:       General: She is not in acute distress.     Appearance: Normal appearance. She is not diaphoretic.   HENT:      Head: Atraumatic.      Mouth/Throat:      Mouth: Mucous membranes are moist.   Eyes:      General: No scleral icterus.     Conjunctiva/sclera: Conjunctivae normal.   Cardiovascular:      Rate and Rhythm: Normal rate and regular rhythm.      Pulses: Normal pulses.      Heart sounds: Normal heart sounds. No murmur heard.     No friction rub. No gallop.   Pulmonary:      Effort: Pulmonary effort is normal. No respiratory distress.      Breath sounds: Normal breath sounds. No wheezing, rhonchi or rales.   Chest:      Chest wall: No tenderness.   Abdominal:      General: Abdomen is flat. Bowel sounds are normal. There is no distension.      Palpations: Abdomen is soft.      Tenderness: There is abdominal tenderness (RUQ). There is no guarding or rebound.   Musculoskeletal:         General: Tenderness (bilateral upper extremites. Not reproducable with palpation or " passive ROM) present. No swelling.      Cervical back: Neck supple.   Skin:     General: Skin is warm and dry.      Coloration: Skin is not jaundiced.      Findings: No erythema, lesion or rash.   Neurological:      Mental Status: She is alert. Mental status is at baseline.         ED Course, Procedures, & Data      Procedures            EKG Interpretation:    Interpreted by Jose Eduardo Rush MD  Symptoms at time of EKG: sickle cell pain   Rhythm: normal sinus rhythm  Rate: 96 bpm  Axis: normal  Ectopy: none  Conduction: normal  ST Segments/ T Waves: No ST-T wave changes  Q Waves: none  Comparison to prior: Similar to prior from 12.30/2024    Clinical Impression: No acute ischemic changes       Results for orders placed or performed during the hospital encounter of 01/17/25   XR Chest 2 Views     Status: None    Narrative    EXAM: XR CHEST 2 VIEWS  LOCATION: [facility]  DATE: 1/17/2025    INDICATION: sickle cell pain crisis  COMPARISON: 12/30/2024      Impression    IMPRESSION: Left-sided portacatheter. Cholecystectomy. Chest otherwise negative.   Reticulocyte count     Status: Abnormal   Result Value Ref Range    % Reticulocyte 9.1 (H) 0.5 - 2.0 %    Absolute Reticulocyte 0.237 (H) 0.025 - 0.095 10e6/uL   Comprehensive metabolic panel     Status: Abnormal   Result Value Ref Range    Sodium 140 135 - 145 mmol/L    Potassium 4.1 3.4 - 5.3 mmol/L    Carbon Dioxide (CO2) 25 22 - 29 mmol/L    Anion Gap 11 7 - 15 mmol/L    Urea Nitrogen 7.9 6.0 - 20.0 mg/dL    Creatinine 0.52 0.51 - 0.95 mg/dL    GFR Estimate >90 >60 mL/min/1.73m2    Calcium 9.0 8.8 - 10.4 mg/dL    Chloride 104 98 - 107 mmol/L    Glucose 98 70 - 99 mg/dL    Alkaline Phosphatase 64 40 - 150 U/L    AST 45 0 - 45 U/L    ALT 24 0 - 50 U/L    Protein Total 7.6 6.4 - 8.3 g/dL    Albumin 4.3 3.5 - 5.2 g/dL    Bilirubin Total 1.8 (H) <=1.2 mg/dL   CBC with platelets and differential     Status: Abnormal   Result  Value Ref Range    WBC Count 13.5 (H) 4.0 - 11.0 10e3/uL    RBC Count 2.57 (L) 3.80 - 5.20 10e6/uL    Hemoglobin 7.3 (L) 11.7 - 15.7 g/dL    Hematocrit 21.2 (L) 35.0 - 47.0 %    MCV 83 78 - 100 fL    MCH 28.4 26.5 - 33.0 pg    MCHC 34.4 31.5 - 36.5 g/dL    RDW 21.6 (H) 10.0 - 15.0 %    Platelet Count 596 (H) 150 - 450 10e3/uL    % Neutrophils 68 %    % Lymphocytes 20 %    % Monocytes 7 %    % Eosinophils 3 %    % Basophils 2 %    % Immature Granulocytes 1 %    NRBCs per 100 WBC 1 (H) <1 /100    Absolute Neutrophils 9.2 (H) 1.6 - 8.3 10e3/uL    Absolute Lymphocytes 2.7 0.8 - 5.3 10e3/uL    Absolute Monocytes 1.0 0.0 - 1.3 10e3/uL    Absolute Eosinophils 0.4 0.0 - 0.7 10e3/uL    Absolute Basophils 0.2 0.0 - 0.2 10e3/uL    Absolute Immature Granulocytes 0.1 <=0.4 10e3/uL    Absolute NRBCs 0.1 10e3/uL   CBC with platelets differential     Status: Abnormal    Narrative    The following orders were created for panel order CBC with platelets differential.  Procedure                               Abnormality         Status                     ---------                               -----------         ------                     CBC with platelets and d...[[id number]]  Abnormal            Final result               RBC and Platelet Morphology[[id number]]                                                   Please view results for these tests on the individual orders.     Medications   HYDROmorphone (DILAUDID) injection 1 mg (1 mg Intravenous $Given 1/17/25 2304)   ketorolac (TORADOL) injection 30 mg (30 mg Intravenous $Given 1/17/25 2135)   lactated ringers BOLUS 1,000 mL ( Intravenous Rate/Dose Verify 1/17/25 6942)   ondansetron (ZOFRAN) injection 8 mg (8 mg Intravenous $Given 1/17/25 2304)     Labs Ordered and Resulted from Time of ED Arrival to Time of ED Departure   RETICULOCYTE COUNT - Abnormal       Result Value    % Reticulocyte 9.1 (*)     Absolute Reticulocyte 0.237 (*)    COMPREHENSIVE METABOLIC PANEL - Abnormal     Sodium 140      Potassium 4.1      Carbon Dioxide (CO2) 25      Anion Gap 11      Urea Nitrogen 7.9      Creatinine 0.52      GFR Estimate >90      Calcium 9.0      Chloride 104      Glucose 98      Alkaline Phosphatase 64      AST 45      ALT 24      Protein Total 7.6      Albumin 4.3      Bilirubin Total 1.8 (*)    CBC WITH PLATELETS AND DIFFERENTIAL - Abnormal    WBC Count 13.5 (*)     RBC Count 2.57 (*)     Hemoglobin 7.3 (*)     Hematocrit 21.2 (*)     MCV 83      MCH 28.4      MCHC 34.4      RDW 21.6 (*)     Platelet Count 596 (*)     % Neutrophils 68      % Lymphocytes 20      % Monocytes 7      % Eosinophils 3      % Basophils 2      % Immature Granulocytes 1      NRBCs per 100 WBC 1 (*)     Absolute Neutrophils 9.2 (*)     Absolute Lymphocytes 2.7      Absolute Monocytes 1.0      Absolute Eosinophils 0.4      Absolute Basophils 0.2      Absolute Immature Granulocytes 0.1      Absolute NRBCs 0.1       XR Chest 2 Views   Final Result   IMPRESSION: Left-sided portacatheter. Cholecystectomy. Chest otherwise negative.             Critical care was not performed.     Medical Decision Making  The patient's presentation was of moderate complexity (a chronic illness mild to moderate exacerbation, progression, or side effect of treatment).    The patient's evaluation involved:  history and exam without other MDM data elements    The patient's management necessitated moderate risk (prescription drug management including medications given in the ED).    Assessment & Plan    Jennifer Cervantes is a 24 year old female with past medical history of sickle cell disease, previous sickle cell crisis, CVA with right hemiplegia, intermittent incontinence, PE, hypoxemia who is taking Hydroxyurea and receiving prophylactic medication for future vaso-occlusive crisis. She presents to ED with bilateral arm burning pain for 1 day that worsened throughout the day. She attempted to manage pain with PRN PTA oxy but it was not adequate. She  denied all other symptoms and ROS negative. Physical exam was noncontributory and patients arm pain was not reproducable or worsened with palpation or passive ROM. RUQ tenderness was noted with deep palpation but patient is not complaining of severe pain. CMP, CBC, reticulocyte count, CXR, and EKG were ordered to further characterize sickle cell burden and rule out associated cardiac concerns.. CXR and EKG came back negative for acute processes or ACS signs . Patient was started on pain plan previously established by hospital specialists when patient arrives to the ED. Patient was monitored until pain crisis resolved and labs demonstrated no findings that required urgent intervention. Given patients current presentation of Sickle Cell Pain Crisis; she is appropriate for treatment at this time. Transfusion were not necessary and hematology consult was not required. Patient should continue her current home pain regimen and management of sickle cell disease.     I have reviewed the nursing notes. I have reviewed the findings, diagnosis, plan and need for follow up with the patient.    --    ED Attending Physician Attestation    I Jose Eduardo Rush MD, cared for this patient with the Resident. I have performed a history and physical examination of the patient and discussed management with the resident. I reviewed the resident's documentation above and agree with the documented findings and plan of care.          Jose Eduardo Rush MD  Emergency Medicine      Final diagnoses:   Sickle cell pain crisis (H)   This patient was seen and discussed with my attending physician.  Gutierrez Bentley DO  PGY-1 Psychiatry Resident     Jose Eduardo Rush  [facility] EMERGENCY DEPARTMENT  1/17/2025     Jose Eduardo Rush MD  01/17/25 0931

## 2025-01-20 ENCOUNTER — INFUSION THERAPY VISIT (OUTPATIENT)
Dept: TRANSPLANT | Facility: CLINIC | Age: 25
End: 2025-01-20
Attending: PEDIATRICS
Payer: COMMERCIAL

## 2025-01-20 ENCOUNTER — NURSE TRIAGE (OUTPATIENT)
Dept: ONCOLOGY | Facility: CLINIC | Age: 25
End: 2025-01-20
Payer: COMMERCIAL

## 2025-01-20 ENCOUNTER — PATIENT OUTREACH (OUTPATIENT)
Dept: CARE COORDINATION | Facility: CLINIC | Age: 25
End: 2025-01-20
Payer: COMMERCIAL

## 2025-01-20 VITALS
HEART RATE: 104 BPM | DIASTOLIC BLOOD PRESSURE: 78 MMHG | RESPIRATION RATE: 18 BRPM | OXYGEN SATURATION: 94 % | SYSTOLIC BLOOD PRESSURE: 137 MMHG | TEMPERATURE: 98.3 F

## 2025-01-20 DIAGNOSIS — G81.10 SPASTIC HEMIPLEGIA, UNSPECIFIED ETIOLOGY, UNSPECIFIED LATERALITY (H): ICD-10-CM

## 2025-01-20 DIAGNOSIS — D57.00 SICKLE CELL PAIN CRISIS (H): Primary | ICD-10-CM

## 2025-01-20 PROCEDURE — 258N000003 HC RX IP 258 OP 636: Performed by: PEDIATRICS

## 2025-01-20 PROCEDURE — 96361 HYDRATE IV INFUSION ADD-ON: CPT

## 2025-01-20 PROCEDURE — 250N000011 HC RX IP 250 OP 636: Performed by: PEDIATRICS

## 2025-01-20 PROCEDURE — 250N000013 HC RX MED GY IP 250 OP 250 PS 637: Performed by: PEDIATRICS

## 2025-01-20 PROCEDURE — 96376 TX/PRO/DX INJ SAME DRUG ADON: CPT

## 2025-01-20 PROCEDURE — 96374 THER/PROPH/DIAG INJ IV PUSH: CPT

## 2025-01-20 PROCEDURE — 96375 TX/PRO/DX INJ NEW DRUG ADDON: CPT

## 2025-01-20 RX ORDER — DIPHENHYDRAMINE HCL 25 MG
50 CAPSULE ORAL
Status: CANCELLED
Start: 2025-04-01

## 2025-01-20 RX ORDER — DIPHENHYDRAMINE HCL 25 MG
50 CAPSULE ORAL
Status: COMPLETED | OUTPATIENT
Start: 2025-01-20 | End: 2025-01-20

## 2025-01-20 RX ORDER — KETOROLAC TROMETHAMINE 30 MG/ML
30 INJECTION, SOLUTION INTRAMUSCULAR; INTRAVENOUS ONCE
Status: CANCELLED
Start: 2025-04-01 | End: 2025-04-01

## 2025-01-20 RX ORDER — ONDANSETRON 2 MG/ML
8 INJECTION INTRAMUSCULAR; INTRAVENOUS EVERY 6 HOURS PRN
Status: CANCELLED
Start: 2025-04-01

## 2025-01-20 RX ORDER — HEPARIN SODIUM (PORCINE) LOCK FLUSH IV SOLN 100 UNIT/ML 100 UNIT/ML
5 SOLUTION INTRAVENOUS
Status: CANCELLED | OUTPATIENT
Start: 2025-04-01

## 2025-01-20 RX ORDER — HEPARIN SODIUM,PORCINE 10 UNIT/ML
5 VIAL (ML) INTRAVENOUS
Status: CANCELLED | OUTPATIENT
Start: 2025-04-01

## 2025-01-20 RX ORDER — KETOROLAC TROMETHAMINE 30 MG/ML
30 INJECTION, SOLUTION INTRAMUSCULAR; INTRAVENOUS ONCE
Status: COMPLETED | OUTPATIENT
Start: 2025-01-20 | End: 2025-01-20

## 2025-01-20 RX ORDER — ONDANSETRON 2 MG/ML
8 INJECTION INTRAMUSCULAR; INTRAVENOUS EVERY 6 HOURS PRN
Status: DISCONTINUED | OUTPATIENT
Start: 2025-01-20 | End: 2025-01-20 | Stop reason: HOSPADM

## 2025-01-20 RX ORDER — ONDANSETRON 4 MG/1
8 TABLET, FILM COATED ORAL
Status: CANCELLED
Start: 2025-04-01

## 2025-01-20 RX ORDER — HEPARIN SODIUM (PORCINE) LOCK FLUSH IV SOLN 100 UNIT/ML 100 UNIT/ML
5 SOLUTION INTRAVENOUS
Status: DISCONTINUED | OUTPATIENT
Start: 2025-01-20 | End: 2025-01-20 | Stop reason: HOSPADM

## 2025-01-20 RX ADMIN — SODIUM CHLORIDE, POTASSIUM CHLORIDE, SODIUM LACTATE AND CALCIUM CHLORIDE 1000 ML: 600; 310; 30; 20 INJECTION, SOLUTION INTRAVENOUS at 12:21

## 2025-01-20 RX ADMIN — HYDROMORPHONE HYDROCHLORIDE 1 MG: 1 INJECTION, SOLUTION INTRAMUSCULAR; INTRAVENOUS; SUBCUTANEOUS at 12:21

## 2025-01-20 RX ADMIN — HYDROMORPHONE HYDROCHLORIDE 1 MG: 1 INJECTION, SOLUTION INTRAMUSCULAR; INTRAVENOUS; SUBCUTANEOUS at 14:27

## 2025-01-20 RX ADMIN — ONDANSETRON 8 MG: 2 INJECTION INTRAMUSCULAR; INTRAVENOUS at 12:21

## 2025-01-20 RX ADMIN — KETOROLAC TROMETHAMINE 30 MG: 30 INJECTION, SOLUTION INTRAMUSCULAR; INTRAVENOUS at 12:21

## 2025-01-20 RX ADMIN — HYDROMORPHONE HYDROCHLORIDE 1 MG: 1 INJECTION, SOLUTION INTRAMUSCULAR; INTRAVENOUS; SUBCUTANEOUS at 13:25

## 2025-01-20 RX ADMIN — DIPHENHYDRAMINE HYDROCHLORIDE 50 MG: 25 CAPSULE ORAL at 12:21

## 2025-01-20 RX ADMIN — Medication 5 ML: at 15:12

## 2025-01-20 ASSESSMENT — PAIN SCALES - GENERAL: PAINLEVEL_OUTOF10: SEVERE PAIN (9)

## 2025-01-20 NOTE — PROGRESS NOTES
Ambulatory Care Management- Transportation Support  Specialty SW - [facility]     Data/Intervention:  Patient Name:    Jennifer Cervantes DOB/Age: 2000 (24 year old)     [facility] team member assisting Specialty SW with transportation request.      Assessment:  CHW/CTA called [facility] [employer] to arrange medical appointment transportation in conjunction with patient's insurance.     Taxi company: [employer]    Scheduled 3:00pm   from [address] [place] [address] to home.     Plan:  Patient is aware of the transportation plan.  available to assist with any other needs.      Maritza Vick MA

## 2025-01-20 NOTE — TELEPHONE ENCOUNTER
Oncology Nurse Triage - Sickle Cell Pain Crisis:    Situation: Jennifer  calling about Sickle Cell Pain Crisis, requesting to be added on for IV fluids and pain medicine    Background:     Patient's last infusion was 1/16/25 ER 1/17/25   Last clinic visit date:1/13/25 Patricia Mantilla   Does patient have active treatment plan? Yes    Assessment of Symptoms:  Onset/Duration of symptoms: 2 day    Is it typical sickle cell pain? Yes  Location: everywhere generalized   Character: Sharp  Intensity: 9/10    Any radiation of pain, numbness, tingling, weakness, warmth, swelling, discoloration of arms or legs?No    Fever? No  (if yes max temperature recorded in last 24 hours):      Chest Pain Present: No    Shortness of breath: No    Other home therapies tried: HEAT/HEATING PAD and WARM BATH     Last home medication taken and when: oxycodone ibuprofen at 10pm     Any Refills Needed?: No    Does patient have transportation & length of time to get to clinic: Yes if morning appt may need transport       Recommendations:     Epic secure chat sent to Patricia Mantilla to see if OK to get IVF/PM today   Placed on infusion call list     If you do not hear from the infusion center by 2pm then you will not be able to get in for an infusion today. If symptoms worsen while waiting for call back, and/or you experience fever, chills, SOB, chest pain, cough, n/v, dizziness, numbness, swelling, discoloration of extremities, then seek emergency evaluation in Emergency Department.     Please note, if you are late for your appt, you risk losing your infusion appt as it may delay another patient's infusion who arrived on time.

## 2025-01-20 NOTE — TELEPHONE ENCOUNTER
BMT can take her as soon as she can get here.     Call placed to Jennifer to ask her how soon she can get here?   Per Jennifer she can be to the [facility] by 11:45     Updated infusion nurses.     Message sent to CCOD to schedule Jennifer at 11:45 for IVF/PM     Message sent to SW to arrange transportation home.

## 2025-01-20 NOTE — TELEPHONE ENCOUNTER
Jennifer is calling to see if there are any openings at [facility] today.   Called over to [facility] and spoke with Travon who states they do not have any openings today.   Informed Jennifer that there are no openings at [facility] and that currently there are no openings at [facility].   Pt voiced understanding.

## 2025-01-22 ENCOUNTER — PATIENT OUTREACH (OUTPATIENT)
Dept: CARE COORDINATION | Facility: CLINIC | Age: 25
End: 2025-01-22
Payer: COMMERCIAL

## 2025-01-22 ENCOUNTER — NURSE TRIAGE (OUTPATIENT)
Dept: ONCOLOGY | Facility: CLINIC | Age: 25
End: 2025-01-22
Payer: COMMERCIAL

## 2025-01-22 ENCOUNTER — INFUSION THERAPY VISIT (OUTPATIENT)
Dept: INFUSION THERAPY | Facility: CLINIC | Age: 25
End: 2025-01-22
Attending: PEDIATRICS
Payer: COMMERCIAL

## 2025-01-22 VITALS
SYSTOLIC BLOOD PRESSURE: 113 MMHG | HEART RATE: 97 BPM | OXYGEN SATURATION: 95 % | TEMPERATURE: 98.1 F | RESPIRATION RATE: 16 BRPM | DIASTOLIC BLOOD PRESSURE: 73 MMHG

## 2025-01-22 DIAGNOSIS — D57.00 SICKLE CELL DISEASE WITH CRISIS (H): ICD-10-CM

## 2025-01-22 DIAGNOSIS — G81.10 SPASTIC HEMIPLEGIA, UNSPECIFIED ETIOLOGY, UNSPECIFIED LATERALITY (H): ICD-10-CM

## 2025-01-22 DIAGNOSIS — D57.00 SICKLE CELL PAIN CRISIS (H): Primary | ICD-10-CM

## 2025-01-22 PROCEDURE — 96361 HYDRATE IV INFUSION ADD-ON: CPT

## 2025-01-22 PROCEDURE — 96375 TX/PRO/DX INJ NEW DRUG ADDON: CPT

## 2025-01-22 PROCEDURE — 96376 TX/PRO/DX INJ SAME DRUG ADON: CPT

## 2025-01-22 PROCEDURE — 250N000013 HC RX MED GY IP 250 OP 250 PS 637: Performed by: PEDIATRICS

## 2025-01-22 PROCEDURE — 96374 THER/PROPH/DIAG INJ IV PUSH: CPT

## 2025-01-22 PROCEDURE — 258N000003 HC RX IP 258 OP 636: Performed by: PEDIATRICS

## 2025-01-22 PROCEDURE — 250N000011 HC RX IP 250 OP 636: Performed by: PEDIATRICS

## 2025-01-22 RX ORDER — ONDANSETRON 4 MG/1
8 TABLET, FILM COATED ORAL
Start: 2025-04-01

## 2025-01-22 RX ORDER — ONDANSETRON 2 MG/ML
8 INJECTION INTRAMUSCULAR; INTRAVENOUS EVERY 6 HOURS PRN
Status: DISCONTINUED | OUTPATIENT
Start: 2025-01-22 | End: 2025-01-22 | Stop reason: HOSPADM

## 2025-01-22 RX ORDER — HEPARIN SODIUM (PORCINE) LOCK FLUSH IV SOLN 100 UNIT/ML 100 UNIT/ML
5 SOLUTION INTRAVENOUS
OUTPATIENT
Start: 2025-04-01

## 2025-01-22 RX ORDER — KETOROLAC TROMETHAMINE 30 MG/ML
30 INJECTION, SOLUTION INTRAMUSCULAR; INTRAVENOUS ONCE
Start: 2025-04-01 | End: 2025-04-01

## 2025-01-22 RX ORDER — DIPHENHYDRAMINE HCL 25 MG
50 CAPSULE ORAL
Status: COMPLETED | OUTPATIENT
Start: 2025-01-22 | End: 2025-01-22

## 2025-01-22 RX ORDER — HEPARIN SODIUM,PORCINE 10 UNIT/ML
5 VIAL (ML) INTRAVENOUS
OUTPATIENT
Start: 2025-04-01

## 2025-01-22 RX ORDER — HEPARIN SODIUM (PORCINE) LOCK FLUSH IV SOLN 100 UNIT/ML 100 UNIT/ML
5 SOLUTION INTRAVENOUS
Status: DISCONTINUED | OUTPATIENT
Start: 2025-01-22 | End: 2025-01-22 | Stop reason: HOSPADM

## 2025-01-22 RX ORDER — OXYCODONE HYDROCHLORIDE 10 MG/1
10 TABLET ORAL EVERY 6 HOURS PRN
Qty: 12 TABLET | Refills: 0 | Status: SHIPPED | OUTPATIENT
Start: 2025-01-22

## 2025-01-22 RX ORDER — ONDANSETRON 2 MG/ML
8 INJECTION INTRAMUSCULAR; INTRAVENOUS EVERY 6 HOURS PRN
Start: 2025-04-01

## 2025-01-22 RX ORDER — DIPHENHYDRAMINE HCL 25 MG
50 CAPSULE ORAL
Start: 2025-04-01

## 2025-01-22 RX ORDER — KETOROLAC TROMETHAMINE 30 MG/ML
30 INJECTION, SOLUTION INTRAMUSCULAR; INTRAVENOUS ONCE
Status: COMPLETED | OUTPATIENT
Start: 2025-01-22 | End: 2025-01-22

## 2025-01-22 RX ADMIN — Medication 5 ML: at 15:39

## 2025-01-22 RX ADMIN — KETOROLAC TROMETHAMINE 30 MG: 30 INJECTION, SOLUTION INTRAMUSCULAR; INTRAVENOUS at 13:17

## 2025-01-22 RX ADMIN — HYDROMORPHONE HYDROCHLORIDE 1 MG: 1 INJECTION, SOLUTION INTRAMUSCULAR; INTRAVENOUS; SUBCUTANEOUS at 15:10

## 2025-01-22 RX ADMIN — ONDANSETRON 8 MG: 2 INJECTION INTRAMUSCULAR; INTRAVENOUS at 13:13

## 2025-01-22 RX ADMIN — HYDROMORPHONE HYDROCHLORIDE 1 MG: 1 INJECTION, SOLUTION INTRAMUSCULAR; INTRAVENOUS; SUBCUTANEOUS at 14:16

## 2025-01-22 RX ADMIN — DIPHENHYDRAMINE HYDROCHLORIDE 50 MG: 25 CAPSULE ORAL at 13:12

## 2025-01-22 RX ADMIN — HYDROMORPHONE HYDROCHLORIDE 1 MG: 1 INJECTION, SOLUTION INTRAMUSCULAR; INTRAVENOUS; SUBCUTANEOUS at 13:09

## 2025-01-22 RX ADMIN — SODIUM CHLORIDE, POTASSIUM CHLORIDE, SODIUM LACTATE AND CALCIUM CHLORIDE 1000 ML: 600; 310; 30; 20 INJECTION, SOLUTION INTRAVENOUS at 12:58

## 2025-01-22 NOTE — PROGRESS NOTES
Nursing Note  Jennifer Cervantes presents today to [facility] for:   Chief Complaint   Patient presents with    Infusion     IVF, pain/nausea medications     During today's [facility] appointment, orders from Dr. Duncan were completed.  Frequency: as needed    Progress note: Patient identification verified by name and date of birth.  Assessment completed.  Vitals recorded in Doc Flowsheets.  Patient was provided with education regarding medication/procedure and possible side effects.  Patient verbalized understanding.     present during visit today: Not Applicable.  Premedications: administered per order.  Infusion length and rate:  500 ml/hr., IV fluids over 2 hours  Labs: were not ordered for this appointment.  Vascular access: port accessed today.  Is the next appt scheduled? Yes, patient has scheduled sidney next week    Post Infusion Assessment:  Patient tolerated infusion without incident.  Blood return noted pre and post infusion.  Site patent and intact, free from redness, edema or discomfort.  No evidence of extravasations.  Access discontinued per protocol.     Discharge Plan:   Follow up plan of care with: ongoing infusions at [facility].  Discharge instructions were reviewed with patient.  Patient/representative verbalized understanding of discharge instructions and all questions answered.  Patient discharged from [facility] in stable condition.    Roz Arellano RN    Administrations This Visit       diphenhydrAMINE (BENADRYL) capsule 50 mg       Admin Date  01/22/2025 Action  $Given Dose  50 mg Route  Oral Documented By  Roz Arellano RN              heparin lock flush 100 unit/mL injection 5 mL       Admin Date  01/22/2025 Action  $Given Dose  5 mL Route  Intracatheter Documented By  Roz Arellano, JOE              HYDROmorphone (DILAUDID) injection 1 mg       Admin  Date  01/22/2025 Action  $Given Dose  1 mg Route  Intravenous Documented By  Roz Arellano RN               Admin Date  01/22/2025 Action  $Given Dose  1 mg Route  Intravenous Documented By  Roz Arellano RN               Admin Date  01/22/2025 Action  $Given Dose  1 mg Route  Intravenous Documented By  Roz Arellano, JOE              ketorolac (TORADOL) injection 30 mg       Admin Date  01/22/2025 Action  $Given Dose  30 mg Route  Intravenous Documented By  Roz Arellano RN              lactated ringers BOLUS 1,000 mL       Admin Date  01/22/2025 Action  $New Bag Dose  1,000 mL Rate  500 mL/hr Route  Intravenous Documented By  Roz Arellano, JOE              ondansetron (ZOFRAN) injection 8 mg       Admin Date  01/22/2025 Action  $Given Dose  8 mg Route  Intravenous Documented By  Roz Arellano RN              sodium chloride (PF) 0.9% PF flush 3-20 mL       Admin Date  01/22/2025 Action  $Given Dose  20 mL Route  Intracatheter Documented By  Roz Arellano RN                    /77   Pulse 98   Temp 98.1  F (36.7  C) (Oral)   Resp 16   SpO2 95%

## 2025-01-22 NOTE — PROGRESS NOTES
Ambulatory Care Management- Transportation Support  Specialty SW - [facility]     Data/Intervention:  Patient Name:    Jennifer Cervantes     /Age: 2000 (24 year old)     [facility] team member assisting Specialty SW with transportation request.      Assessment:  CHW/CTA called [facility] to arrange medical appointment transportation in conjunction with patient's insurance.  between 12:00 - 12:30pm    Taxi company: [employer]    Patient will need to call above taxi company at phone number: [phone] when ready for return ride home.      Plan:  Patient is aware of the transportation plan.  available to assist with any other needs.      Maritza Vick MA  
No

## 2025-01-22 NOTE — TELEPHONE ENCOUNTER
Narcotic Refill Request    Medication(s) requested:  oxycodone  Person Requesting Refill:Jennifer for tomorrow 1/23/2025  What pain is the medication treating: sickle cell crisis pain today left arm and right side of ribs to back  How is the medication being taken?:1 tablet every 6 hours for crisis with goal of 4 per day or less  Does pt have enough for today? Aby will need for tomorrow   Is pain being adequately controlled on the current regimen?: okay, requires IVF/Pain infusions at times.   Experiencing any side effects from medication?: denies.     Date of most recent appointment:  1/13/2025 Patricia Mantilla CNP  Any No Show Visits:non recently  Next appointment:   3/17/2025 Dr. Duncan  Last fill date and by whom:  Patricia Mantilla on 1/15/2025   Reviewed: yes    Send to provider: Patricia Mantilla CNP

## 2025-01-22 NOTE — TELEPHONE ENCOUNTER
Oncology Nurse Triage - Sickle Cell Pain Crisis:    Situation: Jennifer  calling about Sickle Cell Pain Crisis, requesting to be added on for IV fluids and pain medicine    Background:     Patient's last infusion was 1/17/2025 ED visit   Last clinic visit date:1/13/2025 Patricia Mantilla CNP  Does patient have active treatment plan? Yes    Assessment of Symptoms:  Onset/Duration of symptoms: 1 day    Is it typical sickle cell pain? Yes  Location: left armd and right side of ribs to back  Character: Sharp  Intensity: 6/10    Any radiation of pain, numbness, tingling, weakness, warmth, swelling, discoloration of arms or legs?No    Fever? No  Chest Pain Present: No    Shortness of breath: No    Other home therapies tried: HEAT/HEATING PAD and WARM BATH     Last home medication taken and when: last night around 10:00pm    Any Refills Needed?: Oxycodone for tomorrow 1/23/2025,  [facility].     Does patient have transportation & length of time to get to clinic: No, but could try if an early opening is offerred.       Recommendations:     Meets protocol IVF/Pain    0904 offer for appt today with [facility] at 1:00pm for IVF/Pain. Pt in agreement, needs help with ride.     0905 Message sent to  to assist with round trip ride to and from appt today.    0906 Message sent to scheduling to add on appointment for today.     Please note, if you are late for your appt, you risk losing your infusion appt as it may delay another patient's infusion who arrived on time.

## 2025-01-24 ENCOUNTER — HOSPITAL ENCOUNTER (EMERGENCY)
Facility: CLINIC | Age: 25
Discharge: HOME | End: 2025-01-24
Attending: FAMILY MEDICINE | Admitting: FAMILY MEDICINE
Payer: COMMERCIAL

## 2025-01-24 VITALS
HEART RATE: 83 BPM | BODY MASS INDEX: 26.86 KG/M2 | WEIGHT: 156.5 LBS | OXYGEN SATURATION: 93 % | TEMPERATURE: 98 F | RESPIRATION RATE: 16 BRPM | DIASTOLIC BLOOD PRESSURE: 88 MMHG | SYSTOLIC BLOOD PRESSURE: 120 MMHG

## 2025-01-24 DIAGNOSIS — D57.00 SICKLE CELL PAIN CRISIS (H): ICD-10-CM

## 2025-01-24 LAB
ALBUMIN SERPL BCG-MCNC: 4.2 G/DL (ref 3.5–5.2)
ALP SERPL-CCNC: 61 U/L (ref 40–150)
ALT SERPL W P-5'-P-CCNC: 19 U/L (ref 0–50)
ANION GAP SERPL CALCULATED.3IONS-SCNC: 11 MMOL/L (ref 7–15)
AST SERPL W P-5'-P-CCNC: 40 U/L (ref 0–45)
BILIRUB SERPL-MCNC: 2 MG/DL
BUN SERPL-MCNC: 6.9 MG/DL (ref 6–20)
CALCIUM SERPL-MCNC: 8.8 MG/DL (ref 8.8–10.4)
CHLORIDE SERPL-SCNC: 101 MMOL/L (ref 98–107)
CREAT SERPL-MCNC: 0.54 MG/DL (ref 0.51–0.95)
EGFRCR SERPLBLD CKD-EPI 2021: >90 ML/MIN/1.73M2
ERYTHROCYTE [DISTWIDTH] IN BLOOD BY AUTOMATED COUNT: 23.9 % (ref 10–15)
FLUAV RNA SPEC QL NAA+PROBE: NEGATIVE
FLUBV RNA RESP QL NAA+PROBE: NEGATIVE
GLUCOSE SERPL-MCNC: 88 MG/DL (ref 70–99)
HCO3 SERPL-SCNC: 23 MMOL/L (ref 22–29)
HCT VFR BLD AUTO: 20.5 % (ref 35–47)
HGB BLD-MCNC: 7.2 G/DL (ref 11.7–15.7)
MCH RBC QN AUTO: 29.1 PG (ref 26.5–33)
MCHC RBC AUTO-ENTMCNC: 35.1 G/DL (ref 31.5–36.5)
MCV RBC AUTO: 83 FL (ref 78–100)
PLATELET # BLD AUTO: 551 10E3/UL (ref 150–450)
POTASSIUM SERPL-SCNC: 3.6 MMOL/L (ref 3.4–5.3)
PROT SERPL-MCNC: 7.4 G/DL (ref 6.4–8.3)
RBC # BLD AUTO: 2.47 10E6/UL (ref 3.8–5.2)
RETICS # AUTO: 0.5 10E6/UL (ref 0.03–0.1)
RETICS/RBC NFR AUTO: 19.4 % (ref 0.5–2)
RSV RNA SPEC NAA+PROBE: NEGATIVE
SARS-COV-2 RNA RESP QL NAA+PROBE: NEGATIVE
SODIUM SERPL-SCNC: 135 MMOL/L (ref 135–145)
WBC # BLD AUTO: 12.6 10E3/UL (ref 4–11)

## 2025-01-24 PROCEDURE — 250N000011 HC RX IP 250 OP 636

## 2025-01-24 PROCEDURE — 96376 TX/PRO/DX INJ SAME DRUG ADON: CPT | Performed by: FAMILY MEDICINE

## 2025-01-24 PROCEDURE — 84155 ASSAY OF PROTEIN SERUM: CPT

## 2025-01-24 PROCEDURE — 82040 ASSAY OF SERUM ALBUMIN: CPT

## 2025-01-24 PROCEDURE — 87637 SARSCOV2&INF A&B&RSV AMP PRB: CPT

## 2025-01-24 PROCEDURE — 99284 EMERGENCY DEPT VISIT MOD MDM: CPT | Mod: 25 | Performed by: FAMILY MEDICINE

## 2025-01-24 PROCEDURE — 99284 EMERGENCY DEPT VISIT MOD MDM: CPT | Mod: GC | Performed by: FAMILY MEDICINE

## 2025-01-24 PROCEDURE — 36415 COLL VENOUS BLD VENIPUNCTURE: CPT

## 2025-01-24 PROCEDURE — 258N000003 HC RX IP 258 OP 636

## 2025-01-24 PROCEDURE — 96375 TX/PRO/DX INJ NEW DRUG ADDON: CPT | Performed by: FAMILY MEDICINE

## 2025-01-24 PROCEDURE — 85027 COMPLETE CBC AUTOMATED: CPT

## 2025-01-24 PROCEDURE — 96361 HYDRATE IV INFUSION ADD-ON: CPT | Performed by: FAMILY MEDICINE

## 2025-01-24 PROCEDURE — 96374 THER/PROPH/DIAG INJ IV PUSH: CPT | Performed by: FAMILY MEDICINE

## 2025-01-24 PROCEDURE — 250N000011 HC RX IP 250 OP 636: Performed by: FAMILY MEDICINE

## 2025-01-24 PROCEDURE — 85045 AUTOMATED RETICULOCYTE COUNT: CPT

## 2025-01-24 RX ORDER — HEPARIN SODIUM,PORCINE 10 UNIT/ML
5-10 VIAL (ML) INTRAVENOUS EVERY 24 HOURS
Status: DISCONTINUED | OUTPATIENT
Start: 2025-01-24 | End: 2025-01-24

## 2025-01-24 RX ORDER — HEPARIN SODIUM (PORCINE) LOCK FLUSH IV SOLN 100 UNIT/ML 100 UNIT/ML
5-10 SOLUTION INTRAVENOUS
Status: DISCONTINUED | OUTPATIENT
Start: 2025-01-24 | End: 2025-01-24

## 2025-01-24 RX ORDER — ONDANSETRON 4 MG/1
4 TABLET, ORALLY DISINTEGRATING ORAL EVERY 6 HOURS PRN
Status: DISCONTINUED | OUTPATIENT
Start: 2025-01-24 | End: 2025-01-24 | Stop reason: HOSPADM

## 2025-01-24 RX ORDER — KETOROLAC TROMETHAMINE 30 MG/ML
30 INJECTION, SOLUTION INTRAMUSCULAR; INTRAVENOUS ONCE
Status: COMPLETED | OUTPATIENT
Start: 2025-01-24 | End: 2025-01-24

## 2025-01-24 RX ORDER — HEPARIN SODIUM (PORCINE) LOCK FLUSH IV SOLN 100 UNIT/ML 100 UNIT/ML
5 SOLUTION INTRAVENOUS ONCE
Status: COMPLETED | OUTPATIENT
Start: 2025-01-24 | End: 2025-01-24

## 2025-01-24 RX ORDER — HEPARIN SODIUM,PORCINE 10 UNIT/ML
5-10 VIAL (ML) INTRAVENOUS
Status: DISCONTINUED | OUTPATIENT
Start: 2025-01-24 | End: 2025-01-24 | Stop reason: DRUGHIGH

## 2025-01-24 RX ADMIN — SODIUM CHLORIDE, POTASSIUM CHLORIDE, SODIUM LACTATE AND CALCIUM CHLORIDE 1000 ML: 600; 310; 30; 20 INJECTION, SOLUTION INTRAVENOUS at 09:59

## 2025-01-24 RX ADMIN — HYDROMORPHONE HYDROCHLORIDE 1 MG: 1 INJECTION, SOLUTION INTRAMUSCULAR; INTRAVENOUS; SUBCUTANEOUS at 09:59

## 2025-01-24 RX ADMIN — ONDANSETRON 4 MG: 4 TABLET, ORALLY DISINTEGRATING ORAL at 09:59

## 2025-01-24 RX ADMIN — KETOROLAC TROMETHAMINE 30 MG: 30 INJECTION, SOLUTION INTRAMUSCULAR at 09:59

## 2025-01-24 RX ADMIN — HEPARIN SODIUM (PORCINE) LOCK FLUSH IV SOLN 100 UNIT/ML 5 ML: 100 SOLUTION at 14:56

## 2025-01-24 RX ADMIN — HYDROMORPHONE HYDROCHLORIDE 1 MG: 1 INJECTION, SOLUTION INTRAMUSCULAR; INTRAVENOUS; SUBCUTANEOUS at 14:00

## 2025-01-24 RX ADMIN — HYDROMORPHONE HYDROCHLORIDE 1 MG: 1 INJECTION, SOLUTION INTRAMUSCULAR; INTRAVENOUS; SUBCUTANEOUS at 12:14

## 2025-01-24 ASSESSMENT — ACTIVITIES OF DAILY LIVING (ADL)
ADLS_ACUITY_SCORE: 59

## 2025-01-24 ASSESSMENT — ENCOUNTER SYMPTOMS: SICKLE CELL PAIN: 1

## 2025-01-24 NOTE — ED TRIAGE NOTES
Patient has sickle cell. States woke up this morning throwing up. States woke up with severe head pain and nausea. Took oxycodone and muscle relaxer around 0530 today. Nausea is relieved, here for pain control.

## 2025-01-24 NOTE — DISCHARGE INSTRUCTIONS
Thank you for choosing [facility].     Please closely monitor for further symptoms. Return to the Emergency Department if you develop any new or worsening signs or symptoms.    If you received any opiate pain medications or sedatives during your visit, please do not drive for at least 8 hours.     Labs, cultures or final xray interpretations may still need to be reviewed.  We will call you if your plan of care needs to be changed.    Please follow up with your hematologist

## 2025-01-24 NOTE — ED PROVIDER NOTES
ED Provider Note  [facility]      History     Chief Complaint   Patient presents with    Sickle Cell Pain Crisis     Back/head pain       Sickle Cell Pain Crisis    Jennifer Cervantes is a 24 year old female with PMH of sickle cell disease, previous sickle cell crisis, CVA with right hemiplegia, intermittent incontinence, PE, hypoxemia who is taking Hydroxyurea and receiving prophylactic medication for future vaso-occlusive crisis presenting to the ED with 1 day hx of generalized joint pain waking her up from sleep and leading to an episode of non-bloody emesis. Endorses generalized abdominal pain but says it is not different from her usual abdominal pain. Prescribed oxycodone PRN and muscle relaxer did not relieve the pain, so Jennifer presented to the ED for further evaluation and to avoid excessive opioid use. Denies other symptoms such as headache, changes in vision, new weakness, chest pain, difficulty breathing, diarrhea, constipation, and extremity swelling.     Past Medical History  Past Medical History:   Diagnosis Date    Anxiety     Bleeding disorder     Blood clotting disorder     Cerebral infarction (H) 2015    Cognitive developmental delay     low IQ. 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 side (H)     right hand contractures    Iron overload due to repeated red blood cell transfusions     Migraines     Multiple subsegmental pulmonary emboli without acute cor pulmonale (H) 02/01/2021    Oppositional defiant behavior     Presence of intrauterine contraceptive device 2/18/2020    Superficial venous thrombosis of arm, right 03/25/2021    Uncomplicated asthma      Past Surgical History:   Procedure Laterality Date    AS INSERT TUNNELED CV 2 CATH W/O PORT/PUMP      CHOLECYSTECTOMY      CV RIGHT HEART CATH MEASUREMENTS RECORDED N/A 11/18/2021    Procedure: Right  Heart Cath;  Surgeon: Jackson Stauffer MD;  Location:  HEART CARDIAC CATH LAB    INSERT PORT VASCULAR ACCESS Left 4/21/2021    Procedure: INSERTION, VASCULAR ACCESS PORT (NOT SURE ON SIDE UNTIL REMOVAL);  Surgeon: Rajan More MD;  Location: [facility] OR    IR CHEST PORT PLACEMENT > 5 YRS OF AGE  4/21/2021    IR CVC NON TUNNEL LINE REMOVAL  5/6/2021    IR CVC NON TUNNEL PLACEMENT > 5 YRS  04/07/2020    IR CVC NON TUNNEL PLACEMENT > 5 YRS  4/30/2021    IR CVC NON TUNNEL PLACEMENT > 5 YRS  9/7/2022    IR PORT REMOVAL LEFT  4/21/2021    REMOVE PORT VASCULAR ACCESS Left 4/21/2021    Procedure: REMOVAL, VASCULAR ACCESS PORT LEFT;  Surgeon: Rajan More MD;  Location: [facility] OR    REPAIR TENDON ELBOW Right 10/02/2019    Procedure: Right Elbow Flexor Lengthening, Flexor Pronator Slide Of Wrist and Finger, Thumb Adductor Lengthening;  Surgeon: Anai Franco MD;  Location: [facility] OR    TONSILLECTOMY Bilateral 10/02/2019    Procedure: Bilateral Tonsillectomy;  Surgeon: Farhana Guy MD;  Location: [facility] OR    ZZC BREAST REDUCTION (INCLUDES LIPO) TIER 3 Bilateral 04/23/2019     albuterol (PROVENTIL) (2.5 MG/3ML) 0.083% neb solution  aspirin (ASA) 81 MG chewable tablet  budesonide-formoterol (SYMBICORT) 160-4.5 MCG/ACT Inhaler  deferasirox (JADENU) 360 MG tablet  Deferiprone, Twice Daily, 1000 MG TABS  diphenhydrAMINE (BENADRYL) 50 MG capsule  EPINEPHrine (ANY BX GENERIC EQUIV) 0.3 MG/0.3ML injection 2-pack  gabapentin (NEURONTIN) 300 MG capsule  hydroxyurea (HYDREA) 500 MG capsule  ibuprofen (ADVIL/MOTRIN) 800 MG tablet  methocarbamol (ROBAXIN) 750 MG tablet  methylPREDNISolone (MEDROL) 32 MG tablet  naloxone (NARCAN) 4 MG/0.1ML nasal spray  oxyCODONE IR (ROXICODONE) 10 MG tablet      Allergies   Allergen Reactions    Contrast Dye Angioedema     Hives and breathing issues    Fish-Derived Products Hives    Seafood Hives    Adhesive Tape Hives     Primipore dressing causes hives    Gadolinium     Iodinated  Contrast Media      Family History  Family History   Problem Relation Age of Onset    Sickle Cell Trait Mother     Hypertension Mother     Asthma Mother     Sickle Cell Trait Father     Glaucoma No family hx of     Macular Degeneration No family hx of     Diabetes No family hx of     Gout No family hx of      Social History   Social History     Tobacco Use    Smoking status: Never     Passive exposure: Never    Smokeless tobacco: Never   Substance Use Topics    Alcohol use: Not Currently     Alcohol/week: 0.0 standard drinks of alcohol    Drug use: Never      Past medical history, past surgical history, medications, allergies, family history, and social history were reviewed with the patient. No additional pertinent items.   A medically appropriate review of systems was performed with pertinent positives and negatives noted in the HPI, and all other systems negative.    Physical Exam   BP: 124/77  Pulse: 95  Temp: 98  F (36.7  C)  Resp: 17  Weight: 71 kg (156 lb 8 oz)  SpO2: 98 %    Physical Exam  Constitutional:       General: She is not in acute distress.     Appearance: Normal appearance. She is not diaphoretic.   HENT:      Head: Atraumatic.      Mouth/Throat:      Mouth: Mucous membranes are moist.   Eyes:      General: No scleral icterus.     Conjunctiva/sclera: Conjunctivae normal.   Cardiovascular:      Rate and Rhythm: Normal rate.      Heart sounds: Normal heart sounds.   Pulmonary:      Effort: No respiratory distress.      Breath sounds: Normal breath sounds.   Abdominal:      General: Abdomen is flat.      Tenderness: There is abdominal tenderness.      Comments: Generalized abdominal tenderness. No rebound pain, guarding, or, rigidity.    Skin:     General: Skin is warm.      Capillary Refill: Capillary refill takes less than 2 seconds.      Findings: No rash.   Neurological:      Mental Status: She is alert. Mental status is at baseline.   Psychiatric:         Mood and Affect: Mood normal.          Behavior: Behavior normal.       ED Course, Procedures, & Data     ED Course as of 01/24/25 1437   Fri Jan 24, 2025   1240 WBC(!): 12.6     Procedures              No results found for any visits on 01/24/25.  Medications   ondansetron (ZOFRAN ODT) ODT tab 4 mg (4 mg Oral $Given 1/24/25 0959)   lactated ringers BOLUS 1,000 mL (0 mLs Intravenous Stopped 1/24/25 1438)   HYDROmorphone (DILAUDID) injection 1 mg (1 mg Intravenous $Given 1/24/25 0959)   ketorolac (TORADOL) injection 30 mg (30 mg Intravenous $Given 1/24/25 0959)   HYDROmorphone (DILAUDID) injection 1 mg (1 mg Intravenous $Given 1/24/25 1214)   HYDROmorphone (DILAUDID) injection 1 mg (1 mg Intravenous $Given 1/24/25 1400)   heparin lock flush 100 unit/mL injection 5 mL (5 mLs Intracatheter $Given 1/24/25 1456)     Labs Ordered and Resulted from Time of ED Arrival to Time of ED Departure   CBC WITH PLATELETS - Abnormal       Result Value    WBC Count 12.6 (*)     RBC Count 2.47 (*)     Hemoglobin 7.2 (*)     Hematocrit 20.5 (*)     MCV 83      MCH 29.1      MCHC 35.1      RDW 23.9 (*)     Platelet Count 551 (*)    COMPREHENSIVE METABOLIC PANEL - Abnormal    Sodium 135      Potassium 3.6      Carbon Dioxide (CO2) 23      Anion Gap 11      Urea Nitrogen 6.9      Creatinine 0.54      GFR Estimate >90      Calcium 8.8      Chloride 101      Glucose 88      Alkaline Phosphatase 61      AST 40      ALT 19      Protein Total 7.4      Albumin 4.2      Bilirubin Total 2.0 (*)    RETICULOCYTE COUNT - Abnormal    % Reticulocyte 19.4 (*)     Absolute Reticulocyte 0.498 (*)    INFLUENZA A/B, RSV AND SARS-COV2 PCR - Normal    Influenza A PCR Negative      Influenza B PCR Negative      RSV PCR Negative      SARS CoV2 PCR Negative       No orders to display       Assessment & Plan    Jennifer Cervantes is a 24 year old female with PMH of sickle cell disease, previous sickle cell crisis, CVA with right hemiplegia, intermittent incontinence, PE, hypoxemia who is taking Hydroxyurea  and receiving prophylactic medication for future vaso-occlusive crisis presenting to the ED with 1 day hx of generalized joint pain waking her up from sleep and leading to an episode of non-bloody emesis. Pain not managed with at-home pain regimen of Oxycodone PRN, Gabapentin, and Robaxin. No other associated symptoms. Physical exam positive for generalized abdominal tenderness (baseline). S/p LR 1L bolus, dilaudid 1mg x3, and toradol 30mg x1. CBC notable for Hgb 7.2 (baseline) & CMP not concerning. Retic count elevated at 19.4, expected response to hemolysis in the setting of sickle cell crisis. Pain under control after third doses of Dilaudid. Patient was monitored until pain crisis resolved and labs demonstrated no findings that required urgent intervention. Transfusion was not necessary and hematology consult was not required. Patient should continue her current home pain regimen and management of sickle cell disease.     I have reviewed the nursing notes. I have reviewed the findings, diagnosis, plan and need for follow up with the patient.    New Prescriptions    No medications on file       Final diagnoses:   Sickle cell pain crisis (H)       Sandra Javier MD  PGY-1  [facility]  [facility] EMERGENCY DEPARTMENT  1/24/2025  --    ED Attending Physician Attestation    I Ifeanyi Valdez MD, cared for this patient with the Resident. I have performed a history and physical examination of the patient and discussed management with the resident. I reviewed the resident's documentation above and agree with the documented findings and plan of care.    Summary of HPI, PE, ED Course   Patient is a 24 year old female evaluated in the emergency department for sickle cell disease with diffuse pain, history of multiple prior visits for sickle cell pain crisis and has care plan in [facility]. Exam and ED course notable for stable vital signs, physical exam without new acute focal findings, workup is consistent  with known history of sickle disease and labs not markedly changed from baseline.  Was treated per care plan with complete resolution of her symptoms and was requesting discharge. After the completion of care in the emergency department, the patient was discharged.        Medical Decision Making  The patient's presentation was of moderate complexity (a chronic illness mild to moderate exacerbation, progression, or side effect of treatment).    The patient's evaluation involved:  review of external note(s) from 3+ sources (review of multiple prior encounters including ED visit on 117, ED visit on 113, oncology visit on 113)  review of 3+ test result(s) ordered prior to this encounter (review of labs from prior encounters for comparison to today)  ordering and/or review of 3+ test(s) in this encounter (see separate area of note for details)    The patient's management necessitated moderate risk (prescription drug management including medications given in the ED) and high risk (a parenteral controlled substance).      Ifeanyi Valdez MD  Emergency Medicine        Ifeanyi Valdez MD  01/26/25 1044

## 2025-01-27 ENCOUNTER — PATIENT OUTREACH (OUTPATIENT)
Dept: CARE COORDINATION | Facility: CLINIC | Age: 25
End: 2025-01-27
Payer: COMMERCIAL

## 2025-01-27 ENCOUNTER — INFUSION THERAPY VISIT (OUTPATIENT)
Dept: ONCOLOGY | Facility: CLINIC | Age: 25
End: 2025-01-27
Attending: PEDIATRICS
Payer: COMMERCIAL

## 2025-01-27 ENCOUNTER — NURSE TRIAGE (OUTPATIENT)
Dept: ONCOLOGY | Facility: CLINIC | Age: 25
End: 2025-01-27
Payer: COMMERCIAL

## 2025-01-27 VITALS
SYSTOLIC BLOOD PRESSURE: 131 MMHG | DIASTOLIC BLOOD PRESSURE: 94 MMHG | RESPIRATION RATE: 16 BRPM | OXYGEN SATURATION: 95 % | TEMPERATURE: 98.2 F | HEART RATE: 93 BPM

## 2025-01-27 DIAGNOSIS — D57.00 SICKLE CELL PAIN CRISIS (H): Primary | ICD-10-CM

## 2025-01-27 DIAGNOSIS — G81.10 SPASTIC HEMIPLEGIA, UNSPECIFIED ETIOLOGY, UNSPECIFIED LATERALITY (H): ICD-10-CM

## 2025-01-27 PROCEDURE — 250N000013 HC RX MED GY IP 250 OP 250 PS 637: Performed by: PEDIATRICS

## 2025-01-27 PROCEDURE — 250N000011 HC RX IP 250 OP 636: Performed by: PEDIATRICS

## 2025-01-27 PROCEDURE — 258N000003 HC RX IP 258 OP 636: Performed by: PEDIATRICS

## 2025-01-27 PROCEDURE — 96361 HYDRATE IV INFUSION ADD-ON: CPT

## 2025-01-27 PROCEDURE — 96375 TX/PRO/DX INJ NEW DRUG ADDON: CPT

## 2025-01-27 PROCEDURE — 96376 TX/PRO/DX INJ SAME DRUG ADON: CPT

## 2025-01-27 PROCEDURE — 96374 THER/PROPH/DIAG INJ IV PUSH: CPT

## 2025-01-27 RX ORDER — HEPARIN SODIUM (PORCINE) LOCK FLUSH IV SOLN 100 UNIT/ML 100 UNIT/ML
5 SOLUTION INTRAVENOUS
Status: DISCONTINUED | OUTPATIENT
Start: 2025-01-27 | End: 2025-01-27 | Stop reason: HOSPADM

## 2025-01-27 RX ORDER — DIPHENHYDRAMINE HCL 25 MG
50 CAPSULE ORAL
Status: CANCELLED
Start: 2025-04-01

## 2025-01-27 RX ORDER — ONDANSETRON 4 MG/1
8 TABLET, FILM COATED ORAL
Status: CANCELLED
Start: 2025-04-01

## 2025-01-27 RX ORDER — ONDANSETRON 2 MG/ML
8 INJECTION INTRAMUSCULAR; INTRAVENOUS EVERY 6 HOURS PRN
Status: CANCELLED
Start: 2025-04-01

## 2025-01-27 RX ORDER — KETOROLAC TROMETHAMINE 30 MG/ML
30 INJECTION, SOLUTION INTRAMUSCULAR; INTRAVENOUS ONCE
Status: CANCELLED
Start: 2025-04-01 | End: 2025-04-01

## 2025-01-27 RX ORDER — KETOROLAC TROMETHAMINE 30 MG/ML
30 INJECTION, SOLUTION INTRAMUSCULAR; INTRAVENOUS ONCE
Status: COMPLETED | OUTPATIENT
Start: 2025-01-27 | End: 2025-01-27

## 2025-01-27 RX ORDER — ONDANSETRON 2 MG/ML
8 INJECTION INTRAMUSCULAR; INTRAVENOUS EVERY 6 HOURS PRN
Status: DISCONTINUED | OUTPATIENT
Start: 2025-01-27 | End: 2025-01-27 | Stop reason: HOSPADM

## 2025-01-27 RX ORDER — HEPARIN SODIUM (PORCINE) LOCK FLUSH IV SOLN 100 UNIT/ML 100 UNIT/ML
5 SOLUTION INTRAVENOUS
Status: CANCELLED | OUTPATIENT
Start: 2025-04-01

## 2025-01-27 RX ORDER — ONDANSETRON 4 MG/1
8 TABLET, ORALLY DISINTEGRATING ORAL
Status: DISCONTINUED | OUTPATIENT
Start: 2025-01-27 | End: 2025-01-27 | Stop reason: HOSPADM

## 2025-01-27 RX ORDER — HEPARIN SODIUM,PORCINE 10 UNIT/ML
5 VIAL (ML) INTRAVENOUS
Status: CANCELLED | OUTPATIENT
Start: 2025-04-01

## 2025-01-27 RX ORDER — DIPHENHYDRAMINE HCL 25 MG
50 CAPSULE ORAL
Status: COMPLETED | OUTPATIENT
Start: 2025-01-27 | End: 2025-01-27

## 2025-01-27 RX ADMIN — HYDROMORPHONE HYDROCHLORIDE 1 MG: 1 INJECTION, SOLUTION INTRAMUSCULAR; INTRAVENOUS; SUBCUTANEOUS at 12:03

## 2025-01-27 RX ADMIN — ONDANSETRON 8 MG: 2 INJECTION INTRAMUSCULAR; INTRAVENOUS at 12:08

## 2025-01-27 RX ADMIN — HYDROMORPHONE HYDROCHLORIDE 1 MG: 1 INJECTION, SOLUTION INTRAMUSCULAR; INTRAVENOUS; SUBCUTANEOUS at 13:01

## 2025-01-27 RX ADMIN — HEPARIN 5 ML: 100 SYRINGE at 14:39

## 2025-01-27 RX ADMIN — SODIUM CHLORIDE, POTASSIUM CHLORIDE, SODIUM LACTATE AND CALCIUM CHLORIDE 1000 ML: 600; 310; 30; 20 INJECTION, SOLUTION INTRAVENOUS at 12:03

## 2025-01-27 RX ADMIN — KETOROLAC TROMETHAMINE 30 MG: 30 INJECTION, SOLUTION INTRAMUSCULAR; INTRAVENOUS at 12:07

## 2025-01-27 RX ADMIN — DIPHENHYDRAMINE HYDROCHLORIDE 50 MG: 25 CAPSULE ORAL at 12:06

## 2025-01-27 RX ADMIN — HYDROMORPHONE HYDROCHLORIDE 1 MG: 1 INJECTION, SOLUTION INTRAMUSCULAR; INTRAVENOUS; SUBCUTANEOUS at 14:04

## 2025-01-27 NOTE — PROGRESS NOTES
Infusion Nursing Note:  Jennifer Cervantes presents today for IV Fluids/Pain Meds.    Patient seen by provider today: No   present during visit today: Not Applicable.    Note: Denied fevers, chills, cough, SOB<, and chest pain. Reported sharp 8/10 back pain upon arrival to infusion. She was in the ED on Friday for this pain. No nausea or vomiting since ED visit.    Patient is proud that she has only gone to the ED twice so far this year. She has been determined to manage her pain at home. Encouraged her efforts.    Pain 3/10 after IV pain medications. Patient felt comfortable to discharge home and continue to manage her pain.       Intravenous Access:  Implanted Port.    Treatment Conditions:  Not Applicable.      Post Infusion Assessment:  Patient tolerated infusion without incident.  Blood return noted pre and post infusion.  Site patent and intact, free from redness, edema or discomfort.  No evidence of extravasations.  Access discontinued per protocol.       Discharge Plan:   Discharge instructions reviewed with: Patient.  Patient and/or family verbalized understanding of discharge instructions and all questions answered.  AVS to patient via [employer].    Patient discharged in stable condition accompanied by: self.  Departure Mode: Ambulatory.      Earnestine Hale RN

## 2025-01-27 NOTE — PROGRESS NOTES
Ambulatory Care Management- Transportation Support  Specialty SW - [facility]     Data/Intervention:  Patient Name:    Jennifer Cervantes DOB/Age: 2000 (24 year old)     [facility] team member assisting Specialty SW with transportation request.      Assessment:  CHW/CTA called [employer] to arrange medical appointment transportation in conjunction with patient's insurance.     Taxi company: [employer] at [phone]    Scheduled  time from clinic is 4:45pm.     Plan:  Patient is aware of the transportation plan.  available to assist with any other needs.      Maritza Vick MA

## 2025-01-27 NOTE — PATIENT INSTRUCTIONS
[facility] Triage and after hours / weekends / holidays:  [phone] option 5, option 2    Please call the triage or after hours line if you experience a temperature greater than or equal to 100.4, shaking chills, have uncontrolled nausea, vomiting and/or diarrhea, dizziness, shortness of breath, chest pain, bleeding, unexplained bruising, or if you have any other new/concerning symptoms, questions or concerns.      If you are having any concerning symptoms or wish to speak to a provider before your next infusion visit, please call triage to notify your care team so we can adequately serve you.     If you need a refill on a narcotic prescription or other medication, please call before your infusion appointment.

## 2025-01-27 NOTE — TELEPHONE ENCOUNTER
Oncology Nurse Triage - Sickle Cell Pain Crisis:    Situation: Jennifer  calling about Sickle Cell Pain Crisis, requesting to be added on for IV fluids and pain medicine    Background:     Patient's last infusion was ED on 1/24/2025  Last clinic visit date:1/13/2025 Alexi Mantilla CNP  Does patient have active treatment plan? Yes    Assessment of Symptoms:  Onset/Duration of symptoms: 1 day    Is it typical sickle cell pain? Yes  Location: Back  Character: Sharp  Intensity: 8/10    Any radiation of pain, numbness, tingling, weakness, warmth, swelling, discoloration of arms or legs?No    Fever? No    Chest Pain Present: No    Shortness of breath: No    Other home therapies tried: HEAT/HEATING PAD and WARM BATH     Last home medication taken and when: yesterday at 10pm oxycodone and IBUprofen    Any Refills Needed?: No    Does patient have transportation & length of time to get to clinic: Yes and no, has ride to clinic, would need a ride home.       Recommendations:     0710 Fredo Paged Patricia Mantilla CNP  0746 Per Patricia Mantilla CNP, okay to add on for IVF/Pain, No labs needed for today.    0747 offer for today at 1:30pm IVF/pain, has a a ride to clinic, would need a ride home arranged. Patient in agreement with plan.     0751 Scheduling request sent to add on appt for today.     Please note, if you are late for your appt, you risk losing your infusion appt as it may delay another patient's infusion who arrived on time.

## 2025-01-29 RX ORDER — METHYLPREDNISOLONE SODIUM SUCCINATE 40 MG/ML
40 INJECTION INTRAMUSCULAR; INTRAVENOUS
Start: 2025-01-30

## 2025-01-29 RX ORDER — HEPARIN SODIUM (PORCINE) LOCK FLUSH IV SOLN 100 UNIT/ML 100 UNIT/ML
5 SOLUTION INTRAVENOUS
OUTPATIENT
Start: 2025-01-30

## 2025-01-29 RX ORDER — HEPARIN SODIUM,PORCINE 10 UNIT/ML
5 VIAL (ML) INTRAVENOUS
OUTPATIENT
Start: 2025-01-30

## 2025-01-29 RX ORDER — DIPHENHYDRAMINE HYDROCHLORIDE 50 MG/ML
50 INJECTION INTRAMUSCULAR; INTRAVENOUS
Start: 2025-01-30

## 2025-01-29 RX ORDER — ALBUTEROL SULFATE 90 UG/1
1-2 INHALANT RESPIRATORY (INHALATION)
Start: 2025-01-30

## 2025-01-29 RX ORDER — ALBUTEROL SULFATE 0.83 MG/ML
2.5 SOLUTION RESPIRATORY (INHALATION)
OUTPATIENT
Start: 2025-01-30

## 2025-01-29 RX ORDER — EPINEPHRINE 1 MG/ML
0.3 INJECTION, SOLUTION INTRAMUSCULAR; SUBCUTANEOUS EVERY 5 MIN PRN
OUTPATIENT
Start: 2025-01-30

## 2025-01-29 RX ORDER — MEPERIDINE HYDROCHLORIDE 25 MG/ML
25 INJECTION INTRAMUSCULAR; INTRAVENOUS; SUBCUTANEOUS
OUTPATIENT
Start: 2025-01-30

## 2025-01-29 RX ORDER — DIPHENHYDRAMINE HYDROCHLORIDE 50 MG/ML
25 INJECTION INTRAMUSCULAR; INTRAVENOUS
Start: 2025-01-30

## 2025-01-30 ENCOUNTER — INFUSION THERAPY VISIT (OUTPATIENT)
Dept: ONCOLOGY | Facility: CLINIC | Age: 25
End: 2025-01-30
Attending: PEDIATRICS
Payer: COMMERCIAL

## 2025-01-30 ENCOUNTER — HOSPITAL ENCOUNTER (OUTPATIENT)
Dept: MRI IMAGING | Facility: CLINIC | Age: 25
End: 2025-01-30
Attending: REGISTERED NURSE
Payer: COMMERCIAL

## 2025-01-30 ENCOUNTER — ONCOLOGY VISIT (OUTPATIENT)
Dept: ONCOLOGY | Facility: CLINIC | Age: 25
End: 2025-01-30
Attending: REGISTERED NURSE
Payer: COMMERCIAL

## 2025-01-30 ENCOUNTER — LAB (OUTPATIENT)
Dept: LAB | Facility: CLINIC | Age: 25
End: 2025-01-30
Attending: PEDIATRICS
Payer: COMMERCIAL

## 2025-01-30 VITALS
TEMPERATURE: 98.3 F | HEART RATE: 86 BPM | SYSTOLIC BLOOD PRESSURE: 138 MMHG | DIASTOLIC BLOOD PRESSURE: 85 MMHG | BODY MASS INDEX: 27.89 KG/M2 | WEIGHT: 162.5 LBS | RESPIRATION RATE: 20 BRPM

## 2025-01-30 VITALS
DIASTOLIC BLOOD PRESSURE: 66 MMHG | HEART RATE: 85 BPM | TEMPERATURE: 97.8 F | SYSTOLIC BLOOD PRESSURE: 113 MMHG | OXYGEN SATURATION: 88 % | RESPIRATION RATE: 16 BRPM

## 2025-01-30 DIAGNOSIS — G89.29 OTHER CHRONIC PAIN: ICD-10-CM

## 2025-01-30 DIAGNOSIS — D57.00 SICKLE CELL PAIN CRISIS (H): Primary | ICD-10-CM

## 2025-01-30 DIAGNOSIS — G81.10 SPASTIC HEMIPLEGIA, UNSPECIFIED ETIOLOGY, UNSPECIFIED LATERALITY (H): ICD-10-CM

## 2025-01-30 DIAGNOSIS — E83.111 IRON OVERLOAD DUE TO REPEATED RED BLOOD CELL TRANSFUSIONS: ICD-10-CM

## 2025-01-30 DIAGNOSIS — D57.00 SICKLE CELL DISEASE WITH CRISIS (H): ICD-10-CM

## 2025-01-30 DIAGNOSIS — D57.1 HB-SS DISEASE WITHOUT CRISIS (H): Primary | ICD-10-CM

## 2025-01-30 LAB
ANION GAP SERPL CALCULATED.3IONS-SCNC: 9 MMOL/L (ref 7–15)
BASOPHILS # BLD AUTO: 0.3 10E3/UL (ref 0–0.2)
BASOPHILS NFR BLD AUTO: 2 %
BUN SERPL-MCNC: 7.4 MG/DL (ref 6–20)
CALCIUM SERPL-MCNC: 8.9 MG/DL (ref 8.8–10.4)
CHLORIDE SERPL-SCNC: 107 MMOL/L (ref 98–107)
CREAT SERPL-MCNC: 0.54 MG/DL (ref 0.51–0.95)
EGFRCR SERPLBLD CKD-EPI 2021: >90 ML/MIN/1.73M2
EOSINOPHIL # BLD AUTO: 0.4 10E3/UL (ref 0–0.7)
EOSINOPHIL NFR BLD AUTO: 3 %
ERYTHROCYTE [DISTWIDTH] IN BLOOD BY AUTOMATED COUNT: 23.6 % (ref 10–15)
GLUCOSE SERPL-MCNC: 90 MG/DL (ref 70–99)
HCO3 SERPL-SCNC: 22 MMOL/L (ref 22–29)
HCT VFR BLD AUTO: 21.9 % (ref 35–47)
HGB BLD-MCNC: 7.5 G/DL (ref 11.7–15.7)
IMM GRANULOCYTES # BLD: 0.1 10E3/UL
IMM GRANULOCYTES NFR BLD: 1 %
LYMPHOCYTES # BLD AUTO: 2.2 10E3/UL (ref 0.8–5.3)
LYMPHOCYTES NFR BLD AUTO: 18 %
MCH RBC QN AUTO: 28.1 PG (ref 26.5–33)
MCHC RBC AUTO-ENTMCNC: 34.2 G/DL (ref 31.5–36.5)
MCV RBC AUTO: 82 FL (ref 78–100)
MONOCYTES # BLD AUTO: 1 10E3/UL (ref 0–1.3)
MONOCYTES NFR BLD AUTO: 8 %
NEUTROPHILS # BLD AUTO: 8.5 10E3/UL (ref 1.6–8.3)
NEUTROPHILS NFR BLD AUTO: 68 %
NRBC # BLD AUTO: 0.1 10E3/UL
NRBC BLD AUTO-RTO: 1 /100
PLATELET # BLD AUTO: 488 10E3/UL (ref 150–450)
POTASSIUM SERPL-SCNC: 3.9 MMOL/L (ref 3.4–5.3)
RBC # BLD AUTO: 2.67 10E6/UL (ref 3.8–5.2)
RETICS # AUTO: 0.39 10E6/UL (ref 0.03–0.1)
RETICS/RBC NFR AUTO: 16.7 % (ref 0.5–2)
SODIUM SERPL-SCNC: 138 MMOL/L (ref 135–145)
WBC # BLD AUTO: 12.4 10E3/UL (ref 4–11)

## 2025-01-30 PROCEDURE — G0463 HOSPITAL OUTPT CLINIC VISIT: HCPCS | Performed by: REGISTERED NURSE

## 2025-01-30 PROCEDURE — 85045 AUTOMATED RETICULOCYTE COUNT: CPT | Performed by: PEDIATRICS

## 2025-01-30 PROCEDURE — 36591 DRAW BLOOD OFF VENOUS DEVICE: CPT | Performed by: PEDIATRICS

## 2025-01-30 PROCEDURE — 90620 MENB-4C VACCINE IM: CPT | Performed by: PEDIATRICS

## 2025-01-30 PROCEDURE — 258N000003 HC RX IP 258 OP 636: Performed by: PEDIATRICS

## 2025-01-30 PROCEDURE — 250N000021 HC RX MED A9270 GY (STAT IND- M) 250: Performed by: PEDIATRICS

## 2025-01-30 PROCEDURE — 250N000011 HC RX IP 250 OP 636: Performed by: PEDIATRICS

## 2025-01-30 PROCEDURE — 250N000013 HC RX MED GY IP 250 OP 250 PS 637: Performed by: PEDIATRICS

## 2025-01-30 PROCEDURE — 80048 BASIC METABOLIC PNL TOTAL CA: CPT | Performed by: PEDIATRICS

## 2025-01-30 PROCEDURE — 85025 COMPLETE CBC W/AUTO DIFF WBC: CPT | Performed by: PEDIATRICS

## 2025-01-30 PROCEDURE — 74181 MRI ABDOMEN W/O CONTRAST: CPT

## 2025-01-30 PROCEDURE — 90471 IMMUNIZATION ADMIN: CPT | Performed by: PEDIATRICS

## 2025-01-30 RX ORDER — HEPARIN SODIUM (PORCINE) LOCK FLUSH IV SOLN 100 UNIT/ML 100 UNIT/ML
5 SOLUTION INTRAVENOUS ONCE
Status: COMPLETED | OUTPATIENT
Start: 2025-01-30 | End: 2025-01-30

## 2025-01-30 RX ORDER — ONDANSETRON 4 MG/1
8 TABLET, FILM COATED ORAL
Start: 2025-04-01

## 2025-01-30 RX ORDER — ONDANSETRON 4 MG/1
8 TABLET, ORALLY DISINTEGRATING ORAL
Status: DISCONTINUED | OUTPATIENT
Start: 2025-01-30 | End: 2025-01-30 | Stop reason: HOSPADM

## 2025-01-30 RX ORDER — ONDANSETRON 2 MG/ML
8 INJECTION INTRAMUSCULAR; INTRAVENOUS EVERY 6 HOURS PRN
Start: 2025-04-01

## 2025-01-30 RX ORDER — ONDANSETRON 2 MG/ML
8 INJECTION INTRAMUSCULAR; INTRAVENOUS EVERY 6 HOURS PRN
Status: DISCONTINUED | OUTPATIENT
Start: 2025-01-30 | End: 2025-01-30 | Stop reason: HOSPADM

## 2025-01-30 RX ORDER — HEPARIN SODIUM (PORCINE) LOCK FLUSH IV SOLN 100 UNIT/ML 100 UNIT/ML
5 SOLUTION INTRAVENOUS
Status: DISCONTINUED | OUTPATIENT
Start: 2025-01-30 | End: 2025-01-30 | Stop reason: HOSPADM

## 2025-01-30 RX ORDER — KETOROLAC TROMETHAMINE 30 MG/ML
30 INJECTION, SOLUTION INTRAMUSCULAR; INTRAVENOUS ONCE
Status: COMPLETED | OUTPATIENT
Start: 2025-01-30 | End: 2025-01-30

## 2025-01-30 RX ORDER — DIPHENHYDRAMINE HCL 25 MG
50 CAPSULE ORAL
Status: COMPLETED | OUTPATIENT
Start: 2025-01-30 | End: 2025-01-30

## 2025-01-30 RX ORDER — KETOROLAC TROMETHAMINE 30 MG/ML
30 INJECTION, SOLUTION INTRAMUSCULAR; INTRAVENOUS ONCE
Start: 2025-04-01 | End: 2025-04-01

## 2025-01-30 RX ORDER — OXYCODONE HYDROCHLORIDE 10 MG/1
10 TABLET ORAL EVERY 6 HOURS PRN
Qty: 12 TABLET | Refills: 0 | Status: SHIPPED | OUTPATIENT
Start: 2025-01-30

## 2025-01-30 RX ORDER — DIPHENHYDRAMINE HCL 25 MG
50 CAPSULE ORAL
Start: 2025-04-01

## 2025-01-30 RX ORDER — HEPARIN SODIUM,PORCINE 10 UNIT/ML
5 VIAL (ML) INTRAVENOUS
OUTPATIENT
Start: 2025-04-01

## 2025-01-30 RX ORDER — HEPARIN SODIUM (PORCINE) LOCK FLUSH IV SOLN 100 UNIT/ML 100 UNIT/ML
5 SOLUTION INTRAVENOUS
OUTPATIENT
Start: 2025-04-01

## 2025-01-30 RX ADMIN — HYDROMORPHONE HYDROCHLORIDE 1 MG: 1 INJECTION, SOLUTION INTRAMUSCULAR; INTRAVENOUS; SUBCUTANEOUS at 10:43

## 2025-01-30 RX ADMIN — HEPARIN 5 ML: 100 SYRINGE at 14:08

## 2025-01-30 RX ADMIN — HYDROMORPHONE HYDROCHLORIDE 1 MG: 1 INJECTION, SOLUTION INTRAMUSCULAR; INTRAVENOUS; SUBCUTANEOUS at 13:07

## 2025-01-30 RX ADMIN — SODIUM CHLORIDE 400 MG: 9 INJECTION, SOLUTION INTRAVENOUS at 10:57

## 2025-01-30 RX ADMIN — HYDROMORPHONE HYDROCHLORIDE 1 MG: 1 INJECTION, SOLUTION INTRAMUSCULAR; INTRAVENOUS; SUBCUTANEOUS at 12:04

## 2025-01-30 RX ADMIN — SODIUM CHLORIDE 50 ML: 9 INJECTION, SOLUTION INTRAVENOUS at 10:57

## 2025-01-30 RX ADMIN — SODIUM CHLORIDE, POTASSIUM CHLORIDE, SODIUM LACTATE AND CALCIUM CHLORIDE 1000 ML: 600; 310; 30; 20 INJECTION, SOLUTION INTRAVENOUS at 12:03

## 2025-01-30 RX ADMIN — ONDANSETRON 8 MG: 2 INJECTION INTRAMUSCULAR; INTRAVENOUS at 10:48

## 2025-01-30 RX ADMIN — NEISSERIA MENINGITIDIS SEROGROUP B NHBA FUSION PROTEIN ANTIGEN, NEISSERIA MENINGITIDIS SEROGROUP B FHBP FUSION PROTEIN ANTIGEN AND NEISSERIA MENINGITIDIS SEROGROUP B NADA PROTEIN ANTIGEN 0.5 ML: 50; 50; 50; 25 INJECTION, SUSPENSION INTRAMUSCULAR at 13:04

## 2025-01-30 RX ADMIN — Medication 5 ML: at 10:03

## 2025-01-30 RX ADMIN — DIPHENHYDRAMINE HYDROCHLORIDE 50 MG: 25 CAPSULE ORAL at 10:48

## 2025-01-30 RX ADMIN — KETOROLAC TROMETHAMINE 30 MG: 30 INJECTION, SOLUTION INTRAMUSCULAR; INTRAVENOUS at 12:09

## 2025-01-30 ASSESSMENT — PAIN SCALES - GENERAL
PAINLEVEL_OUTOF10: MODERATE PAIN (5)
PAINLEVEL_OUTOF10: SEVERE PAIN (9)

## 2025-01-30 NOTE — PROGRESS NOTES
Infusion Nursing Note:  Jennifer Cervantes presents today for Crizanlizumab + IV Fluids/pain meds.    Patient seen by provider today: Yes: Patricia Mantilla CNP   present during visit today: Not Applicable.    Note: Patient arrives to infusion reporting 9/10 low and and bilateral leg pain. States she did not have time to take her pain meds this morning due to early appts. Requesting pain meds and IV fluids in addition to Jr today. Pain goal 5/10. Requesting Oxycodone refill (gets weekly). Requesting all medications in therapy plan today.    Per written communication Patricia Mantilla CNP/Allison Orozco, RN @1146 1/30/25:  - I briefly mentioned to Jennifer she may be due for a Men B booster but had to do further research on this. Turns out she is due for a booster. Can you ask if she wants this today?     Pt verbalized agreement with receiving vaccine today.    After three doses of Dilaudid and Toradol, pt reported pain improved to 5/10.      Intravenous Access:  Implanted Port.    Treatment Conditions:  Lab Results   Component Value Date    HGB 7.5 (L) 01/30/2025    WBC 12.4 (H) 01/30/2025    ANEU 8.5 (H) 12/30/2024    ANEUTAUTO 8.5 (H) 01/30/2025     (H) 01/30/2025        Lab Results   Component Value Date     01/30/2025    POTASSIUM 3.9 01/30/2025    MAG 2.0 05/16/2024    CR 0.54 01/30/2025    MICAH 8.9 01/30/2025    BILITOTAL 2.0 (H) 01/24/2025    ALBUMIN 4.2 01/24/2025    ALT 19 01/24/2025    AST 40 01/24/2025       Results reviewed.      Post Infusion Assessment:  Patient tolerated infusion without incident.  Patient tolerated Meningococcal B vaccine injection without incident. She reported brief period of coughing post injection but resolved and denied difficulty breathing or any other acute concerns. Notified Patricia Mantilla CNP. VSS.  Patient observed for 30 minutes post Jr per protocol.  Patient observe for 15 min post vaccine per protocol.  Blood return noted pre and post infusion.  Site patent  and intact, free from redness, edema or discomfort.  No evidence of extravasations.  Access discontinued per protocol.       Discharge Plan:   Prescription refills given for Oxycodone.  Discharge instructions reviewed with: Patient.  Patient and/or family verbalized understanding of discharge instructions and all questions answered.  AVS to patient via [employer].  Patient will return 2/27 for next appointment.   Patient discharged in stable condition accompanied by: self.  Departure Mode: Ambulatory.      Allison Orozco RN

## 2025-01-30 NOTE — PROGRESS NOTES
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 She had multiple thromboembolic events in 2021 despite adherent anticoagulation use (though warfarin was perpetually low) and there are concerns for chronic thromboembolic disease but did not have pulmonary HTN on a November 2021 cath. She is maintained on chronic PO opioids and twice-weekly infusion visits (since 1/24/22) but has been able to be maintained on this regimen and has stayed out of the ED most of the time with even rarer admissions for most of 2023. In 2024, her ED visits have increased somewhat but admissions remain rare.    Interval History:  David is seen for routine follow-up and sidney infusion today.    She has been able to decrease her ED utilization over the past months and feels very proud of this. Her pain has been difficult to deal with. Most days she takes oxycodone twice a day-once in the morning and once at night. In addition she uses ibuprofen which she feels is most helpful for her pain and usually tries to take this before oxycodone.     She has started to see a therapist weekly as arranged by her case working and \"state nurse\" and has found this very helpful in navigating interpersonal relationships in her family. They are also helping her arrange acupuncture which she apparently has tried briefly in the past after a surgery when she was under the care of pediatrics.     Sickle Cell Disease Comprehensive Checklist  Bone Health/Avascular Necrosis Screening/Symptoms (each visit): no new " concerns today  Leg Ulcer evaluation (every visit): nothing to note  Hypertension (every visit):stable none for several months  Last pulmonary evaluation (asthma, AMAN, pulm HTN): 9/28/22, due for follow-up  Stroke/silent cerebral infarct Hx (Y/N): Yes TIA ~2014, first event ~age 1 with full stroke and R sided weakness  Last PCP Visit: 9/30/24 with Dr. Case  Vaccines:  PCV13: 5/13/19  Pneumovax (PPSV23): 3/04, 10/09, 7/12/19, 10/2024, due in 2029  Menactra: 4/2010, 9/2015 (MCV done 8/16/21)  MenB: 9/16/15, 5/13/19-due  Influenza: 10/11/24  Audiology (chelation): done 2/27/24    Comparison to Previous Audiogram dated 6/6/2022:     Pure Tone Thresholds 250-8000 Hz:    RIGHT: stable    LEFT: stable     High Frequency Audiometry 9,000-16,000Hz:     RIGHT: stable    LEFT: stable     Word Recognition Score:    RIGHT: stable    LEFT: stable    Plan last reviewed with patient: 10/2/23    Patient background: 22 yo F, enjoys movies and kids though there are times where she does not really want to talk to people. Does not have a lot of social support at home.     Sickle Cell Disease History  Primary Hematologist Team: Jose Rafael Duncan  PCP: none  Genotype: SS  Acute Pain Crisis Treatment: (limiting IV)   ER   Dilaudid 1 mg IV q1h up to 3 doses  Toradol 30 mg IV x1   Maintenance IV fluids with LR  Other: Zofran 8 mg IV PRN nausea  Inpatient:  PCA Dilaudid 1 hr q30 minutes, no basal rate  Toradol 30 mg x6h x 4 hr  LR maintenance x 1-2 days  Other Medications: Zofran  ASA  Supportive Care: Docusate, Senna  Chronic Pain Medications:    Home regimen: oxycodone 15 mg p.o. q.4-6 hours p.r.n. breakthrough pain.  She also continues on Voltaren gel, and Zoloft among other medications.    -Also benefits from mental health visits, acupuncture  Baseline Hemoglobin: 7 g/dl without chronic transfusions  Hydroxyurea use: Yes  H/O blood transfusions: Yes, several (iron overload) Most recent 11/20/2021  H/O Transfusion Reactions:  no  Antibodies:none  H/O Acute Chest Syndrome: Yes  Last episode:9/05/22 (previously 4/26/21, 10/2019)   ICU/intubation: not with 9/2022 admission  H/O Stroke: Yes (managed with chronic transfusions in the past, stopped late Spring 2020)  H/O VTE: Yes (2/2021)  H/O Cholecystectomy or Splenectomy: no  H/O Asthma, Pulm HTN, AVN, Leg Ulcers, Nephropathy, Retinopathy, etc: Iron overload, asthma, chronic lung disease, physical limitations from early stroke    ---------------------------------------  Jennifer Cervantes's Goals (discussed 1/30/25)    1-3 month goal:      6 month goal:      12 month goal:      Disease-specific goal(s):  Continue to decrease ED visits  Start acupuncture  Continue weekly therapy      Current Outpatient Medications   Medication Sig Dispense Refill    oxyCODONE IR (ROXICODONE) 10 MG tablet Take 1 tablet (10 mg) by mouth every 6 hours as needed for severe pain (Goal of less than 4 per day). 12 tablet 0    albuterol (PROVENTIL) (2.5 MG/3ML) 0.083% neb solution Take 2 vials (5 mg) by nebulization every 6 hours as needed for shortness of breath or wheezing. 90 mL 3    aspirin (ASA) 81 MG chewable tablet Take 1 tablet (81 mg) by mouth 2 times daily 60 tablet 11    budesonide-formoterol (SYMBICORT) 160-4.5 MCG/ACT Inhaler Inhale 2 puffs twice daily plus 1-2 puffs as needed. May use up to 12 puffs per day. 20.4 g 11    deferasirox (JADENU) 360 MG tablet Take 4 tablets (1,440 mg) by mouth daily. 120 tablet 11    Deferiprone, Twice Daily, 1000 MG TABS Take 2,000 mg by mouth 2 times daily. 150 tablet 3    diphenhydrAMINE (BENADRYL) 50 MG capsule Administer 12  hours pre - IV contrast injection and 2 hours prior to contrast. Patient must have a . 2 capsule 0    EPINEPHrine (ANY BX GENERIC EQUIV) 0.3 MG/0.3ML injection 2-pack Inject 0.3 mLs (0.3 mg) into the muscle as needed for anaphylaxis. 1 each 1    gabapentin (NEURONTIN) 300 MG capsule Take 1 capsule (300 mg) by mouth 3 times daily. Take PO 1 pill in  evening (300 mg) TID to start. If tolerated, increase by 300 mg in morning or lunch every few days, updating your doctor's office in time to get refills. The maximum dose is 900 mg TID. 90 capsule 1    hydroxyurea (HYDREA) 500 MG capsule Take 6 capsules (3,000 mg) by mouth daily 180 capsule 11    ibuprofen (ADVIL/MOTRIN) 800 MG tablet Take 800 mg by mouth every 8 hours as needed for moderate pain      methocarbamol (ROBAXIN) 750 MG tablet Take 1 tablet (750 mg) by mouth 4 times daily as needed for muscle spasms (during sickle pain crises. Okay to take scheduled while in pain). 60 tablet 1    methylPREDNISolone (MEDROL) 32 MG tablet Take 1 tab 12 hours before appointment and 1 tab 2 hours prior to the procedure with IV contrast. 2 tablet 0    naloxone (NARCAN) 4 MG/0.1ML nasal spray Spray 4 mg into one nostril alternating nostrils as needed for opioid reversal. every 2-3 minutes until assistance arrives 0.2 mL 0     Past Medical History  Past Medical History:   Diagnosis Date    Anxiety     Bleeding disorder     Blood clotting disorder     Cerebral infarction (H) 2015    Cognitive developmental delay     low IQ. 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 side (H)     right hand contractures    Iron overload due to repeated red blood cell transfusions     Migraines     Multiple subsegmental pulmonary emboli without acute cor pulmonale (H) 02/01/2021    Oppositional defiant behavior     Presence of intrauterine contraceptive device 2/18/2020    Superficial venous thrombosis of arm, right 03/25/2021    Uncomplicated asthma      Past Surgical History:   Procedure Laterality Date    AS INSERT TUNNELED CV 2 CATH W/O PORT/PUMP      CHOLECYSTECTOMY      CV RIGHT HEART CATH MEASUREMENTS RECORDED N/A 11/18/2021    Procedure: Right Heart Cath;  Surgeon: Jackson Stauffer MD;  Location:  [facility]  CATH LAB    INSERT PORT VASCULAR ACCESS Left 4/21/2021    Procedure: INSERTION, VASCULAR ACCESS PORT (NOT SURE ON SIDE UNTIL REMOVAL);  Surgeon: Rajan More MD;  Location: [facility] OR    IR CHEST PORT PLACEMENT > 5 YRS OF AGE  4/21/2021    IR CVC NON TUNNEL LINE REMOVAL  5/6/2021    IR CVC NON TUNNEL PLACEMENT > 5 YRS  04/07/2020    IR CVC NON TUNNEL PLACEMENT > 5 YRS  4/30/2021    IR CVC NON TUNNEL PLACEMENT > 5 YRS  9/7/2022    IR PORT REMOVAL LEFT  4/21/2021    REMOVE PORT VASCULAR ACCESS Left 4/21/2021    Procedure: REMOVAL, VASCULAR ACCESS PORT LEFT;  Surgeon: Rajan More MD;  Location: [facility] OR    REPAIR TENDON ELBOW Right 10/02/2019    Procedure: Right Elbow Flexor Lengthening, Flexor Pronator Slide Of Wrist and Finger, Thumb Adductor Lengthening;  Surgeon: Anai Franco MD;  Location: [facility] OR    TONSILLECTOMY Bilateral 10/02/2019    Procedure: Bilateral Tonsillectomy;  Surgeon: Farhana Guy MD;  Location: [facility] OR    ZZC BREAST REDUCTION (INCLUDES LIPO) TIER 3 Bilateral 04/23/2019     Allergies   Allergen Reactions    Contrast Dye Angioedema     Hives and breathing issues    Fish-Derived Products Hives    Seafood Hives    Adhesive Tape Hives     Primipore dressing causes hives    Gadolinium     Iodinated Contrast Media      Social History   Social History     Tobacco Use    Smoking status: Never     Passive exposure: Never    Smokeless tobacco: Never   Substance Use Topics    Alcohol use: Not Currently     Alcohol/week: 0.0 standard drinks of alcohol    Drug use: Never   Past medical history and social history were reviewed.    Physical Examination:  /85 (BP Location: Right arm, Patient Position: Sitting, Cuff Size: Adult Regular)   Pulse 86   Temp 98.3  F (36.8  C) (Oral)   Resp 20   Wt 73.7 kg (162 lb 8 oz)   BMI 27.89 kg/m       Wt Readings from Last 10 Encounters:   01/30/25 73.7 kg (162 lb 8 oz)   01/24/25 71 kg (156 lb 8 oz)   01/17/25 71.2 kg (157 lb)   01/02/25 71.6 kg  (157 lb 12.8 oz)   12/27/24 68 kg (150 lb)   12/20/24 73.2 kg (161 lb 4.8 oz)   12/16/24 70.3 kg (155 lb)   12/13/24 70.3 kg (155 lb)   12/07/24 72.2 kg (159 lb 1.6 oz)   11/27/24 70.8 kg (156 lb)     General: Pleasant female, NAD  Eyes: EOMI, PERRL  ENT: oral mucosa moist, pink  Respiratory: CTA bilaterally, no wheezes  CV: rrr, no m/g/r  Ext: no peripheral edema  Neurologic: chronic contracture of right arm and wrist. Steady gait. A/O x 4.  Skin: No rashes, petechiae, or bruising noted on exposed skin.   Laboratory Data:  Recent Results (from the past 240 hours)   Influenza A/B, RSV and SARS-CoV2 PCR (COVID-19) Nose    Collection Time: 01/24/25 10:05 AM    Specimen: Nose; Swab   Result Value Ref Range    Influenza A PCR Negative Negative    Influenza B PCR Negative Negative    RSV PCR Negative Negative    SARS CoV2 PCR Negative Negative   CBC with platelets    Collection Time: 01/24/25 10:09 AM   Result Value Ref Range    WBC Count 12.6 (H) 4.0 - 11.0 10e3/uL    RBC Count 2.47 (L) 3.80 - 5.20 10e6/uL    Hemoglobin 7.2 (L) 11.7 - 15.7 g/dL    Hematocrit 20.5 (L) 35.0 - 47.0 %    MCV 83 78 - 100 fL    MCH 29.1 26.5 - 33.0 pg    MCHC 35.1 31.5 - 36.5 g/dL    RDW 23.9 (H) 10.0 - 15.0 %    Platelet Count 551 (H) 150 - 450 10e3/uL   Comprehensive metabolic panel    Collection Time: 01/24/25 10:09 AM   Result Value Ref Range    Sodium 135 135 - 145 mmol/L    Potassium 3.6 3.4 - 5.3 mmol/L    Carbon Dioxide (CO2) 23 22 - 29 mmol/L    Anion Gap 11 7 - 15 mmol/L    Urea Nitrogen 6.9 6.0 - 20.0 mg/dL    Creatinine 0.54 0.51 - 0.95 mg/dL    GFR Estimate >90 >60 mL/min/1.73m2    Calcium 8.8 8.8 - 10.4 mg/dL    Chloride 101 98 - 107 mmol/L    Glucose 88 70 - 99 mg/dL    Alkaline Phosphatase 61 40 - 150 U/L    AST 40 0 - 45 U/L    ALT 19 0 - 50 U/L    Protein Total 7.4 6.4 - 8.3 g/dL    Albumin 4.2 3.5 - 5.2 g/dL    Bilirubin Total 2.0 (H) <=1.2 mg/dL   Reticulocyte count    Collection Time: 01/24/25 10:09 AM   Result Value Ref  Range    % Reticulocyte 19.4 (H) 0.5 - 2.0 %    Absolute Reticulocyte 0.498 (H) 0.025 - 0.095 10e6/uL   Basic metabolic panel    Collection Time: 01/30/25 10:00 AM   Result Value Ref Range    Sodium 138 135 - 145 mmol/L    Potassium 3.9 3.4 - 5.3 mmol/L    Chloride 107 98 - 107 mmol/L    Carbon Dioxide (CO2) 22 22 - 29 mmol/L    Anion Gap 9 7 - 15 mmol/L    Urea Nitrogen 7.4 6.0 - 20.0 mg/dL    Creatinine 0.54 0.51 - 0.95 mg/dL    GFR Estimate >90 >60 mL/min/1.73m2    Calcium 8.9 8.8 - 10.4 mg/dL    Glucose 90 70 - 99 mg/dL   Reticulocyte count    Collection Time: 01/30/25 10:00 AM   Result Value Ref Range    % Reticulocyte 16.7 (H) 0.5 - 2.0 %    Absolute Reticulocyte 0.388 (H) 0.025 - 0.095 10e6/uL   CBC with platelets and differential    Collection Time: 01/30/25 10:00 AM   Result Value Ref Range    WBC Count 12.4 (H) 4.0 - 11.0 10e3/uL    RBC Count 2.67 (L) 3.80 - 5.20 10e6/uL    Hemoglobin 7.5 (L) 11.7 - 15.7 g/dL    Hematocrit 21.9 (L) 35.0 - 47.0 %    MCV 82 78 - 100 fL    MCH 28.1 26.5 - 33.0 pg    MCHC 34.2 31.5 - 36.5 g/dL    RDW 23.6 (H) 10.0 - 15.0 %    Platelet Count 488 (H) 150 - 450 10e3/uL    % Neutrophils 68 %    % Lymphocytes 18 %    % Monocytes 8 %    % Eosinophils 3 %    % Basophils 2 %    % Immature Granulocytes 1 %    NRBCs per 100 WBC 1 (H) <1 /100    Absolute Neutrophils 8.5 (H) 1.6 - 8.3 10e3/uL    Absolute Lymphocytes 2.2 0.8 - 5.3 10e3/uL    Absolute Monocytes 1.0 0.0 - 1.3 10e3/uL    Absolute Eosinophils 0.4 0.0 - 0.7 10e3/uL    Absolute Basophils 0.3 (H) 0.0 - 0.2 10e3/uL    Absolute Immature Granulocytes 0.1 <=0.4 10e3/uL    Absolute NRBCs 0.1 10e3/uL     I reviewed the above labs today.    Assessment and Plan:  1. Sickle Cell HgbSS Disease  2. Acute pain crises  3. Chronic Pain  4. 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 History of CVA  7. Hearing loss  8. Nausea/vomiting     Jennifer has had ongoing success in reducing the number of  ED visits in 2025 with only 2 visits so far this month. We discussed ongoing efforts to decrease her overall opioid use slowly. I encouraged focusing on using her home oxycodone only as-needed rather than on a scheduled basis since she is currently taking 1-2 tablets every day. We will continue her current home oxycodone prescriptions of 12 tablets per week which has remained stable for the past 2 months.     She had a Ferriscan this morning with pending results. She remains compliant to her home medications. Asthmas has been well controlled recently with Symbicort. She has not required PRN albuterol nebulizers. She is due for a Men B booster today which she is agreeable to.     We will continue to see her monthly for ANDREAS/MD visits with her sidney infusions.       -------------------------------------  Plan:  -Continue Hydrea to 3000mg daily to help lessen frequency of sickle cell pain.   -Continue monthly crizanlizumab infusions  -Continue to reassess plan for home oxycodone use.  Continue prescriptions for 12 tablets per week.  We can continue to decrease weekly if she desires.  The ultimate goal would be to avoid any dose or quantity escalations.  -She can self-reduce infusion days/week and we will continue to keep the cap at 2/week for now.   -Continue infusion center visits limited to two times per week (Mondays and Fridays ideally although still needs to call to request). Continue diligent home management with current medications, heat, rest, compression, warm baths.   -If unable to manage at home can go to ED  with continued plan to use IV Dilaudid and take a break from ketamine (10/2/23). No PCA use and goal for any admission would still be to discharge by 5 days or less  -Hold plan for EGD for evaluation of intermittent n/v, abdominal pain as she has missed multiple scheduled procedures d/t limitations with transportation  -Continue metoclopramide 5 mg TID PRN if this continues to be helpful for  nausea/vomtiing  - Continue Jadenu for iron overload and deferiprone. Follow-up on Ferriscan results when available  -Continue aspirin BID  -Men B booster today  -RTC with me in 4 weeks    Patricia Mantilla CNP  ---  47 minutes spent on the date of the encounter doing chart review, review of test results, interpretation of tests, patient visit, and documentation.    The longitudinal plan of care for the diagnosis(es)/condition(s) as documented were addressed during this visit. Due to the added complexity in care, I will continue to support Jennifer in the subsequent management and with ongoing continuity of care.

## 2025-01-30 NOTE — NURSING NOTE
Chief Complaint   Patient presents with    Blood Draw     Labs obtained via noncoring powerport 20 gauge, 3/4 inch needle, heparin flushed, checked into next visit

## 2025-01-30 NOTE — NURSING NOTE
"Oncology Rooming Note    January 30, 2025 8:51 AM   Jennifer Cervantes is a 24 year old female who presents for:    Chief Complaint   Patient presents with    Oncology Clinic Visit     Sickle Cell Anemia     Initial Vitals: /85 (BP Location: Right arm, Patient Position: Sitting, Cuff Size: Adult Regular)   Pulse 86   Temp 98.3  F (36.8  C) (Oral)   Resp 20   Wt 73.7 kg (162 lb 8 oz)   BMI 27.89 kg/m   Estimated body mass index is 27.89 kg/m  as calculated from the following:    Height as of 1/17/25: 1.626 m (5' 4\").    Weight as of this encounter: 73.7 kg (162 lb 8 oz). Body surface area is 1.82 meters squared.  Severe Pain (9) Comment: Data Unavailable   No LMP recorded. Patient has had an implant.  Allergies reviewed: Yes  Medications reviewed: Yes    Medications: MEDICATION REFILLS NEEDED TODAY. Provider was NOT notified.  Pharmacy name entered into [employer]    [facility] - [place], [place] - [id number]  [facility] - [place], [place] - [address] [place] [address]    Frailty Screening:   Is the patient here for a new oncology consult visit in cancer care? 2. No      Clinical concerns: needs refill for oxycodone.    Would like pain meds with fluids today if possible      Jake Hill              "

## 2025-01-30 NOTE — Clinical Note
"1/30/2025      Jennifer Cervantes  [address]  [facility] [address]      Dear Colleague,    Thank you for referring your patient, Jennifer Cervantes, to the [facility]. Please see a copy of my visit note below.    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 She had multiple thromboembolic events in 2021 despite adherent anticoagulation use (though warfarin was perpetually low) and there are concerns for chronic thromboembolic disease but did not have pulmonary HTN on a November 2021 cath. She is maintained on chronic PO opioids and twice-weekly infusion visits (since 1/24/22) but has been able to be maintained on this regimen and has stayed out of the ED most of the time with even rarer admissions for most of 2023. In 2024, her ED visits have increased somewhat but admissions remain rare.    Interval History:  David is seen for routine follow-up and sidney infusion today.    She has been able to decrease her ED utilization over the past months and feels very proud of this. Her pain has been difficult to deal with. Most days she takes oxycodone twice a day-once in the morning and once at night. In addition she uses ibuprofen which she feels is most helpful for her pain and usually tries to take this before oxycodone.     She has started to see a therapist weekly as arranged by her case working and \"state nurse\" and has found this very helpful in navigating interpersonal relationships in her family. They are also helping her arrange " acupuncture which she apparently has tried briefly in the past after a surgery when she was under the care of pediatrics.     Sickle Cell Disease Comprehensive Checklist  Bone Health/Avascular Necrosis Screening/Symptoms (each visit): no new concerns today  Leg Ulcer evaluation (every visit): nothing to note  Hypertension (every visit):stable none for several months  Last pulmonary evaluation (asthma, AMAN, pulm HTN): 9/28/22, due for follow-up  Stroke/silent cerebral infarct Hx (Y/N): Yes TIA ~2014, first event ~age 1 with full stroke and R sided weakness  Last PCP Visit: 9/30/24 with Dr. Case  Vaccines:  PCV13: 5/13/19  Pneumovax (PPSV23): 3/04, 10/09, 7/12/19, 10/2024, due in 2029  Menactra: 4/2010, 9/2015 (MCV done 8/16/21)  MenB: 9/16/15, 5/13/19-due  Influenza: 10/11/24  Audiology (chelation): done 2/27/24    Comparison to Previous Audiogram dated 6/6/2022:     Pure Tone Thresholds 250-8000 Hz:    RIGHT: stable    LEFT: stable     High Frequency Audiometry 9,000-16,000Hz:     RIGHT: stable    LEFT: stable     Word Recognition Score:    RIGHT: stable    LEFT: stable    Plan last reviewed with patient: 10/2/23    Patient background: 24 yo F, enjoys movies and kids though there are times where she does not really want to talk to people. Does not have a lot of social support at home.     Sickle Cell Disease History  Primary Hematologist Team: Jose Rafael Duncan  PCP: none  Genotype: SS  Acute Pain Crisis Treatment: (limiting IV)   ER   Dilaudid 1 mg IV q1h up to 3 doses  Toradol 30 mg IV x1   Maintenance IV fluids with LR  Other: Zofran 8 mg IV PRN nausea  Inpatient:  PCA Dilaudid 1 hr q30 minutes, no basal rate  Toradol 30 mg x6h x 4 hr  LR maintenance x 1-2 days  Other Medications: Zofran  ASA  Supportive Care: Docusate, Senna  Chronic Pain Medications:    Home regimen: oxycodone 15 mg p.o. q.4-6 hours p.r.n. breakthrough pain.  She also continues on Voltaren gel, and Zoloft among other medications.    -Also  benefits from mental health visits, acupuncture  Baseline Hemoglobin: 7 g/dl without chronic transfusions  Hydroxyurea use: Yes  H/O blood transfusions: Yes, several (iron overload) Most recent 11/20/2021  H/O Transfusion Reactions: no  Antibodies:none  H/O Acute Chest Syndrome: Yes  Last episode:9/05/22 (previously 4/26/21, 10/2019)   ICU/intubation: not with 9/2022 admission  H/O Stroke: Yes (managed with chronic transfusions in the past, stopped late Spring 2020)  H/O VTE: Yes (2/2021)  H/O Cholecystectomy or Splenectomy: no  H/O Asthma, Pulm HTN, AVN, Leg Ulcers, Nephropathy, Retinopathy, etc: Iron overload, asthma, chronic lung disease, physical limitations from early stroke    ---------------------------------------  Jennifer Cervantes's Goals (discussed 1/30/25)    1-3 month goal:      6 month goal:      12 month goal:      Disease-specific goal(s):  Continue to decrease ED visits  Start acupuncture  Continue weekly therapy      Current Outpatient Medications   Medication Sig Dispense Refill    oxyCODONE IR (ROXICODONE) 10 MG tablet Take 1 tablet (10 mg) by mouth every 6 hours as needed for severe pain (Goal of less than 4 per day). 12 tablet 0    albuterol (PROVENTIL) (2.5 MG/3ML) 0.083% neb solution Take 2 vials (5 mg) by nebulization every 6 hours as needed for shortness of breath or wheezing. 90 mL 3    aspirin (ASA) 81 MG chewable tablet Take 1 tablet (81 mg) by mouth 2 times daily 60 tablet 11    budesonide-formoterol (SYMBICORT) 160-4.5 MCG/ACT Inhaler Inhale 2 puffs twice daily plus 1-2 puffs as needed. May use up to 12 puffs per day. 20.4 g 11    deferasirox (JADENU) 360 MG tablet Take 4 tablets (1,440 mg) by mouth daily. 120 tablet 11    Deferiprone, Twice Daily, 1000 MG TABS Take 2,000 mg by mouth 2 times daily. 150 tablet 3    diphenhydrAMINE (BENADRYL) 50 MG capsule Administer 12  hours pre - IV contrast injection and 2 hours prior to contrast. Patient must have a . 2 capsule 0    EPINEPHrine  (ANY BX GENERIC EQUIV) 0.3 MG/0.3ML injection 2-pack Inject 0.3 mLs (0.3 mg) into the muscle as needed for anaphylaxis. 1 each 1    gabapentin (NEURONTIN) 300 MG capsule Take 1 capsule (300 mg) by mouth 3 times daily. Take PO 1 pill in evening (300 mg) TID to start. If tolerated, increase by 300 mg in morning or lunch every few days, updating your doctor's office in time to get refills. The maximum dose is 900 mg TID. 90 capsule 1    hydroxyurea (HYDREA) 500 MG capsule Take 6 capsules (3,000 mg) by mouth daily 180 capsule 11    ibuprofen (ADVIL/MOTRIN) 800 MG tablet Take 800 mg by mouth every 8 hours as needed for moderate pain      methocarbamol (ROBAXIN) 750 MG tablet Take 1 tablet (750 mg) by mouth 4 times daily as needed for muscle spasms (during sickle pain crises. Okay to take scheduled while in pain). 60 tablet 1    methylPREDNISolone (MEDROL) 32 MG tablet Take 1 tab 12 hours before appointment and 1 tab 2 hours prior to the procedure with IV contrast. 2 tablet 0    naloxone (NARCAN) 4 MG/0.1ML nasal spray Spray 4 mg into one nostril alternating nostrils as needed for opioid reversal. every 2-3 minutes until assistance arrives 0.2 mL 0     Past Medical History  Past Medical History:   Diagnosis Date    Anxiety     Bleeding disorder     Blood clotting disorder     Cerebral infarction (H) 2015    Cognitive developmental delay     low IQ. 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 side (H)     right hand contractures    Iron overload due to repeated red blood cell transfusions     Migraines     Multiple subsegmental pulmonary emboli without acute cor pulmonale (H) 02/01/2021    Oppositional defiant behavior     Presence of intrauterine contraceptive device 2/18/2020    Superficial venous thrombosis of arm, right 03/25/2021    Uncomplicated asthma      Past Surgical History:   Procedure  Laterality Date    AS INSERT TUNNELED CV 2 CATH W/O PORT/PUMP      CHOLECYSTECTOMY      CV RIGHT HEART CATH MEASUREMENTS RECORDED N/A 11/18/2021    Procedure: Right Heart Cath;  Surgeon: Jackson Stauffer MD;  Location:  HE[facility] CARD[facility] CATH LAB    INSERT PORT VASCULAR ACCESS Left 4/21/2021    Procedure: INSERTION, VASCULAR ACCESS PORT (NOT SURE ON SIDE UNTIL REMOVAL);  Surgeon: Rajan More MD;  Location: [facility] OR    IR CHEST PORT PLACEMENT > 5 YRS OF AGE  4/21/2021    IR CVC NON TUNNEL LINE REMOVAL  5/6/2021    IR CVC NON TUNNEL PLACEMENT > 5 YRS  04/07/2020    IR CVC NON TUNNEL PLACEMENT > 5 YRS  4/30/2021    IR CVC NON TUNNEL PLACEMENT > 5 YRS  9/7/2022    IR PORT REMOVAL LEFT  4/21/2021    REMOVE PORT VASCULAR ACCESS Left 4/21/2021    Procedure: REMOVAL, VASCULAR ACCESS PORT LEFT;  Surgeon: Rajan More MD;  Location: [facility] OR    REPAIR TENDON ELBOW Right 10/02/2019    Procedure: Right Elbow Flexor Lengthening, Flexor Pronator Slide Of Wrist and Finger, Thumb Adductor Lengthening;  Surgeon: Anai Franco MD;  Location: [facility] OR    TONSILLECTOMY Bilateral 10/02/2019    Procedure: Bilateral Tonsillectomy;  Surgeon: Farhana Guy MD;  Location: [facility] OR    ZZC BREAST REDUCTION (INCLUDES LIPO) TIER 3 Bilateral 04/23/2019     Allergies   Allergen Reactions    Contrast Dye Angioedema     Hives and breathing issues    Fish-Derived Products Hives    Seafood Hives    Adhesive Tape Hives     Primipore dressing causes hives    Gadolinium     Iodinated Contrast Media      Social History   Social History     Tobacco Use    Smoking status: Never     Passive exposure: Never    Smokeless tobacco: Never   Substance Use Topics    Alcohol use: Not Currently     Alcohol/week: 0.0 standard drinks of alcohol    Drug use: Never   Past medical history and social history were reviewed.    Physical Examination:  /85 (BP Location: Right arm, Patient Position: Sitting, Cuff Size: Adult Regular)   Pulse 86    Temp 98.3  F (36.8  C) (Oral)   Resp 20   Wt 73.7 kg (162 lb 8 oz)   BMI 27.89 kg/m       Wt Readings from Last 10 Encounters:   01/30/25 73.7 kg (162 lb 8 oz)   01/24/25 71 kg (156 lb 8 oz)   01/17/25 71.2 kg (157 lb)   01/02/25 71.6 kg (157 lb 12.8 oz)   12/27/24 68 kg (150 lb)   12/20/24 73.2 kg (161 lb 4.8 oz)   12/16/24 70.3 kg (155 lb)   12/13/24 70.3 kg (155 lb)   12/07/24 72.2 kg (159 lb 1.6 oz)   11/27/24 70.8 kg (156 lb)     General: Pleasant female, NAD  Eyes: EOMI, PERRL  ENT: oral mucosa moist, pink  Respiratory: CTA bilaterally, no wheezes  CV: rrr, no m/g/r  Ext: no peripheral edema  Neurologic: chronic contracture of right arm and wrist. Steady gait. A/O x 4.  Skin: No rashes, petechiae, or bruising noted on exposed skin.   Laboratory Data:  Recent Results (from the past 240 hours)   Influenza A/B, RSV and SARS-CoV2 PCR (COVID-19) Nose    Collection Time: 01/24/25 10:05 AM    Specimen: Nose; Swab   Result Value Ref Range    Influenza A PCR Negative Negative    Influenza B PCR Negative Negative    RSV PCR Negative Negative    SARS CoV2 PCR Negative Negative   CBC with platelets    Collection Time: 01/24/25 10:09 AM   Result Value Ref Range    WBC Count 12.6 (H) 4.0 - 11.0 10e3/uL    RBC Count 2.47 (L) 3.80 - 5.20 10e6/uL    Hemoglobin 7.2 (L) 11.7 - 15.7 g/dL    Hematocrit 20.5 (L) 35.0 - 47.0 %    MCV 83 78 - 100 fL    MCH 29.1 26.5 - 33.0 pg    MCHC 35.1 31.5 - 36.5 g/dL    RDW 23.9 (H) 10.0 - 15.0 %    Platelet Count 551 (H) 150 - 450 10e3/uL   Comprehensive metabolic panel    Collection Time: 01/24/25 10:09 AM   Result Value Ref Range    Sodium 135 135 - 145 mmol/L    Potassium 3.6 3.4 - 5.3 mmol/L    Carbon Dioxide (CO2) 23 22 - 29 mmol/L    Anion Gap 11 7 - 15 mmol/L    Urea Nitrogen 6.9 6.0 - 20.0 mg/dL    Creatinine 0.54 0.51 - 0.95 mg/dL    GFR Estimate >90 >60 mL/min/1.73m2    Calcium 8.8 8.8 - 10.4 mg/dL    Chloride 101 98 - 107 mmol/L    Glucose 88 70 - 99 mg/dL    Alkaline Phosphatase  61 40 - 150 U/L    AST 40 0 - 45 U/L    ALT 19 0 - 50 U/L    Protein Total 7.4 6.4 - 8.3 g/dL    Albumin 4.2 3.5 - 5.2 g/dL    Bilirubin Total 2.0 (H) <=1.2 mg/dL   Reticulocyte count    Collection Time: 01/24/25 10:09 AM   Result Value Ref Range    % Reticulocyte 19.4 (H) 0.5 - 2.0 %    Absolute Reticulocyte 0.498 (H) 0.025 - 0.095 10e6/uL   Basic metabolic panel    Collection Time: 01/30/25 10:00 AM   Result Value Ref Range    Sodium 138 135 - 145 mmol/L    Potassium 3.9 3.4 - 5.3 mmol/L    Chloride 107 98 - 107 mmol/L    Carbon Dioxide (CO2) 22 22 - 29 mmol/L    Anion Gap 9 7 - 15 mmol/L    Urea Nitrogen 7.4 6.0 - 20.0 mg/dL    Creatinine 0.54 0.51 - 0.95 mg/dL    GFR Estimate >90 >60 mL/min/1.73m2    Calcium 8.9 8.8 - 10.4 mg/dL    Glucose 90 70 - 99 mg/dL   Reticulocyte count    Collection Time: 01/30/25 10:00 AM   Result Value Ref Range    % Reticulocyte 16.7 (H) 0.5 - 2.0 %    Absolute Reticulocyte 0.388 (H) 0.025 - 0.095 10e6/uL   CBC with platelets and differential    Collection Time: 01/30/25 10:00 AM   Result Value Ref Range    WBC Count 12.4 (H) 4.0 - 11.0 10e3/uL    RBC Count 2.67 (L) 3.80 - 5.20 10e6/uL    Hemoglobin 7.5 (L) 11.7 - 15.7 g/dL    Hematocrit 21.9 (L) 35.0 - 47.0 %    MCV 82 78 - 100 fL    MCH 28.1 26.5 - 33.0 pg    MCHC 34.2 31.5 - 36.5 g/dL    RDW 23.6 (H) 10.0 - 15.0 %    Platelet Count 488 (H) 150 - 450 10e3/uL    % Neutrophils 68 %    % Lymphocytes 18 %    % Monocytes 8 %    % Eosinophils 3 %    % Basophils 2 %    % Immature Granulocytes 1 %    NRBCs per 100 WBC 1 (H) <1 /100    Absolute Neutrophils 8.5 (H) 1.6 - 8.3 10e3/uL    Absolute Lymphocytes 2.2 0.8 - 5.3 10e3/uL    Absolute Monocytes 1.0 0.0 - 1.3 10e3/uL    Absolute Eosinophils 0.4 0.0 - 0.7 10e3/uL    Absolute Basophils 0.3 (H) 0.0 - 0.2 10e3/uL    Absolute Immature Granulocytes 0.1 <=0.4 10e3/uL    Absolute NRBCs 0.1 10e3/uL     I reviewed the above labs today.    Assessment and Plan:  1. Sickle Cell HgbSS Disease  2. Acute  pain crises  3. Chronic Pain  4. 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 History of CVA  7. Hearing loss  8. Nausea/vomiting     Jennifer has had ongoing success in reducing the number of ED visits in 2025 with only 2 visits so far this month. We discussed ongoing efforts to decrease her overall opioid use slowly. I encouraged focusing on using her home oxycodone only as-needed rather than on a scheduled basis since she is currently taking 1-2 tablets every day. We will continue her current home oxycodone prescriptions of 12 tablets per week which has remained stable for the past 2 months.     She had a Ferriscan this morning with pending results. She remains compliant to her home medications. Asthmas has been well controlled recently with Symbicort. She has not required PRN albuterol nebulizers. She is due for a Men B booster today which she is agreeable to.     We will continue to see her monthly for ANDREAS/MD visits with her sidney infusions.       -------------------------------------  Plan:  -Continue Hydrea to 3000mg daily to help lessen frequency of sickle cell pain.   -Continue monthly crizanlizumab infusions  -Continue to reassess plan for home oxycodone use.  Continue prescriptions for 12 tablets per week.  We can continue to decrease weekly if she desires.  The ultimate goal would be to avoid any dose or quantity escalations.  -She can self-reduce infusion days/week and we will continue to keep the cap at 2/week for now.   -Continue infusion center visits limited to two times per week (Mondays and Fridays ideally although still needs to call to request). Continue diligent home management with current medications, heat, rest, compression, warm baths.   -If unable to manage at home can go to ED  with continued plan to use IV Dilaudid and take a break from ketamine (10/2/23). No PCA use and goal for any admission would still be to discharge by 5 days or  less  -Hold plan for EGD for evaluation of intermittent n/v, abdominal pain as she has missed multiple scheduled procedures d/t limitations with transportation  -Continue metoclopramide 5 mg TID PRN if this continues to be helpful for nausea/vomtiing  - Continue Jadenu for iron overload and deferiprone. Follow-up on Ferriscan results when available  -Continue aspirin BID  -Men B booster today  -RTC with me in 4 weeks    Patricia Mantilla CNP  ---  47 minutes spent on the date of the encounter doing chart review, review of test results, interpretation of tests, patient visit, and documentation.    The longitudinal plan of care for the diagnosis(es)/condition(s) as documented were addressed during this visit. Due to the added complexity in care, I will continue to support Jennifer in the subsequent management and with ongoing continuity of care.                    Again, thank you for allowing me to participate in the care of your patient.        Sincerely,        Patricia Mantilla CNP    Electronically signed

## 2025-01-31 PROBLEM — Z77.21 EXPOSURE TO BLOOD OR BODY FLUID: Status: ACTIVE | Noted: 2025-01-31

## 2025-02-03 ENCOUNTER — NURSE TRIAGE (OUTPATIENT)
Dept: ONCOLOGY | Facility: CLINIC | Age: 25
End: 2025-02-03
Payer: COMMERCIAL

## 2025-02-03 NOTE — TELEPHONE ENCOUNTER
Pt calling to check on status of wait list. Informed pt no available appts currently. Reviewed with pt if sx progress/worsen while waiting for a call back she should report to ED. Pt verbalized understanding.

## 2025-02-03 NOTE — TELEPHONE ENCOUNTER
Oncology Nurse Triage - Sickle Cell Pain Crisis:    Situation: Jennifer  calling about Sickle Cell Pain Crisis, requesting to be added on for IV fluids and pain medicine    Background:     Patient's last infusion was 01/30/25  Last clinic visit date:01/31/25 w/Vida Falcon  Does patient have active treatment plan? Yes    Assessment of Symptoms:  Onset/Duration of symptoms: 3 day    Is it typical sickle cell pain? Yes  Location: bilateral arms  Character: throbbing  Intensity: 10/10    Any radiation of pain, numbness, tingling, weakness, warmth, swelling, discoloration of arms or legs?No    Fever? No  (if yes max temperature recorded in last 24 hours):      Chest Pain Present: No    Shortness of breath: No    Other home therapies tried: HEAT/HEATING PAD and WARM BATH     Last home medication taken and when: 0600 oxycodone and ibuprofen    Any Refills Needed?: No    Does patient have transportation & length of time to get to clinic: Yes, 20 min.      Recommendations:   Added to infusion wait list for IVF/pain meds per protocol.    If you do not hear from the infusion center by 2pm then you will not be able to get in for an infusion today. If symptoms worsen while waiting for call back, and/or you experience fever, chills, SOB, chest pain, cough, n/v, dizziness, numbness, swelling, discoloration of extremities, then seek emergency evaluation in Emergency Department.

## 2025-02-04 ENCOUNTER — INFUSION THERAPY VISIT (OUTPATIENT)
Dept: INFUSION THERAPY | Facility: CLINIC | Age: 25
End: 2025-02-04
Attending: PEDIATRICS
Payer: COMMERCIAL

## 2025-02-04 ENCOUNTER — ANCILLARY PROCEDURE (OUTPATIENT)
Dept: GENERAL RADIOLOGY | Facility: CLINIC | Age: 25
End: 2025-02-04
Attending: PEDIATRICS
Payer: COMMERCIAL

## 2025-02-04 ENCOUNTER — NURSE TRIAGE (OUTPATIENT)
Dept: ONCOLOGY | Facility: CLINIC | Age: 25
End: 2025-02-04

## 2025-02-04 ENCOUNTER — PATIENT OUTREACH (OUTPATIENT)
Dept: CARE COORDINATION | Facility: CLINIC | Age: 25
End: 2025-02-04

## 2025-02-04 ENCOUNTER — OFFICE VISIT (OUTPATIENT)
Dept: INTERNAL MEDICINE | Facility: CLINIC | Age: 25
End: 2025-02-04
Payer: COMMERCIAL

## 2025-02-04 ENCOUNTER — LAB (OUTPATIENT)
Dept: LAB | Facility: CLINIC | Age: 25
End: 2025-02-04
Payer: COMMERCIAL

## 2025-02-04 VITALS
DIASTOLIC BLOOD PRESSURE: 70 MMHG | HEART RATE: 91 BPM | RESPIRATION RATE: 14 BRPM | OXYGEN SATURATION: 90 % | SYSTOLIC BLOOD PRESSURE: 113 MMHG | BODY MASS INDEX: 27.68 KG/M2 | WEIGHT: 162.1 LBS | HEIGHT: 64 IN

## 2025-02-04 VITALS — OXYGEN SATURATION: 96 % | HEART RATE: 85 BPM | SYSTOLIC BLOOD PRESSURE: 127 MMHG | DIASTOLIC BLOOD PRESSURE: 74 MMHG

## 2025-02-04 DIAGNOSIS — D57.00 SICKLE CELL CRISIS (H): ICD-10-CM

## 2025-02-04 DIAGNOSIS — S83.8X2A MENISCAL INJURY, LEFT, INITIAL ENCOUNTER: ICD-10-CM

## 2025-02-04 DIAGNOSIS — G81.10 SPASTIC HEMIPLEGIA, UNSPECIFIED ETIOLOGY, UNSPECIFIED LATERALITY (H): ICD-10-CM

## 2025-02-04 DIAGNOSIS — M89.9 LESION OF BONE OF KNEE: ICD-10-CM

## 2025-02-04 DIAGNOSIS — J45.40 MODERATE PERSISTENT ASTHMA WITHOUT COMPLICATION: ICD-10-CM

## 2025-02-04 DIAGNOSIS — M25.562 ACUTE PAIN OF LEFT KNEE: ICD-10-CM

## 2025-02-04 DIAGNOSIS — J45.40 MODERATE PERSISTENT ASTHMA, UNSPECIFIED WHETHER COMPLICATED: ICD-10-CM

## 2025-02-04 DIAGNOSIS — D57.00 SICKLE CELL PAIN CRISIS (H): ICD-10-CM

## 2025-02-04 DIAGNOSIS — R09.02 HYPOXIA: Primary | ICD-10-CM

## 2025-02-04 DIAGNOSIS — D57.00 SICKLE CELL PAIN CRISIS (H): Primary | ICD-10-CM

## 2025-02-04 DIAGNOSIS — R09.02 HYPOXIA: ICD-10-CM

## 2025-02-04 DIAGNOSIS — Z11.4 SCREENING FOR HIV (HUMAN IMMUNODEFICIENCY VIRUS): ICD-10-CM

## 2025-02-04 LAB
CRP SERPL-MCNC: 4.65 MG/L
ERYTHROCYTE [DISTWIDTH] IN BLOOD BY AUTOMATED COUNT: 23 % (ref 10–15)
HCT VFR BLD AUTO: 18.9 % (ref 35–47)
HGB BLD-MCNC: 6.8 G/DL (ref 11.7–15.7)
MCH RBC QN AUTO: 28.8 PG (ref 26.5–33)
MCHC RBC AUTO-ENTMCNC: 36 G/DL (ref 31.5–36.5)
MCV RBC AUTO: 80 FL (ref 78–100)
PLATELET # BLD AUTO: 371 10E3/UL (ref 150–450)
RBC # BLD AUTO: 2.36 10E6/UL (ref 3.8–5.2)
WBC # BLD AUTO: 12.3 10E3/UL (ref 4–11)

## 2025-02-04 PROCEDURE — 36415 COLL VENOUS BLD VENIPUNCTURE: CPT | Performed by: PATHOLOGY

## 2025-02-04 PROCEDURE — 96376 TX/PRO/DX INJ SAME DRUG ADON: CPT

## 2025-02-04 PROCEDURE — 71046 X-RAY EXAM CHEST 2 VIEWS: CPT | Performed by: RADIOLOGY

## 2025-02-04 PROCEDURE — 96374 THER/PROPH/DIAG INJ IV PUSH: CPT

## 2025-02-04 PROCEDURE — 258N000003 HC RX IP 258 OP 636: Performed by: PEDIATRICS

## 2025-02-04 PROCEDURE — 96375 TX/PRO/DX INJ NEW DRUG ADDON: CPT

## 2025-02-04 PROCEDURE — 96361 HYDRATE IV INFUSION ADD-ON: CPT

## 2025-02-04 PROCEDURE — 73564 X-RAY EXAM KNEE 4 OR MORE: CPT | Mod: LT | Performed by: RADIOLOGY

## 2025-02-04 PROCEDURE — 86140 C-REACTIVE PROTEIN: CPT | Performed by: PATHOLOGY

## 2025-02-04 PROCEDURE — 250N000011 HC RX IP 250 OP 636: Performed by: PEDIATRICS

## 2025-02-04 PROCEDURE — 85027 COMPLETE CBC AUTOMATED: CPT | Performed by: PATHOLOGY

## 2025-02-04 PROCEDURE — 250N000013 HC RX MED GY IP 250 OP 250 PS 637: Performed by: PEDIATRICS

## 2025-02-04 RX ORDER — HEPARIN SODIUM (PORCINE) LOCK FLUSH IV SOLN 100 UNIT/ML 100 UNIT/ML
5 SOLUTION INTRAVENOUS
Status: CANCELLED | OUTPATIENT
Start: 2025-04-01

## 2025-02-04 RX ORDER — ONDANSETRON 8 MG/1
8 TABLET, FILM COATED ORAL
Start: 2025-04-01

## 2025-02-04 RX ORDER — DIPHENHYDRAMINE HCL 25 MG
50 CAPSULE ORAL
Status: COMPLETED | OUTPATIENT
Start: 2025-02-04 | End: 2025-02-04

## 2025-02-04 RX ORDER — KETOROLAC TROMETHAMINE 30 MG/ML
30 INJECTION, SOLUTION INTRAMUSCULAR; INTRAVENOUS ONCE
Status: CANCELLED
Start: 2025-04-01 | End: 2025-04-01

## 2025-02-04 RX ORDER — ONDANSETRON 2 MG/ML
8 INJECTION INTRAMUSCULAR; INTRAVENOUS EVERY 6 HOURS PRN
Status: DISCONTINUED | OUTPATIENT
Start: 2025-02-04 | End: 2025-02-04 | Stop reason: HOSPADM

## 2025-02-04 RX ORDER — KETOROLAC TROMETHAMINE 30 MG/ML
30 INJECTION, SOLUTION INTRAMUSCULAR; INTRAVENOUS ONCE
Status: COMPLETED | OUTPATIENT
Start: 2025-02-04 | End: 2025-02-04

## 2025-02-04 RX ORDER — HEPARIN SODIUM,PORCINE 10 UNIT/ML
5 VIAL (ML) INTRAVENOUS
OUTPATIENT
Start: 2025-04-01

## 2025-02-04 RX ORDER — BUDESONIDE AND FORMOTEROL FUMARATE DIHYDRATE 160; 4.5 UG/1; UG/1
AEROSOL RESPIRATORY (INHALATION)
Qty: 20.4 G | Refills: 11 | Status: SHIPPED | OUTPATIENT
Start: 2025-02-04

## 2025-02-04 RX ORDER — HEPARIN SODIUM (PORCINE) LOCK FLUSH IV SOLN 100 UNIT/ML 100 UNIT/ML
5 SOLUTION INTRAVENOUS
Status: DISCONTINUED | OUTPATIENT
Start: 2025-02-04 | End: 2025-02-04 | Stop reason: HOSPADM

## 2025-02-04 RX ORDER — ONDANSETRON 2 MG/ML
8 INJECTION INTRAMUSCULAR; INTRAVENOUS EVERY 6 HOURS PRN
Status: CANCELLED
Start: 2025-04-01

## 2025-02-04 RX ORDER — DIPHENHYDRAMINE HCL 25 MG
50 CAPSULE ORAL
Status: CANCELLED
Start: 2025-04-01

## 2025-02-04 RX ADMIN — HYDROMORPHONE HYDROCHLORIDE 1 MG: 1 INJECTION, SOLUTION INTRAMUSCULAR; INTRAVENOUS; SUBCUTANEOUS at 14:20

## 2025-02-04 RX ADMIN — HEPARIN SODIUM (PORCINE) LOCK FLUSH IV SOLN 100 UNIT/ML 5 ML: 100 SOLUTION at 14:36

## 2025-02-04 RX ADMIN — ONDANSETRON 8 MG: 2 INJECTION INTRAMUSCULAR; INTRAVENOUS at 12:24

## 2025-02-04 RX ADMIN — HYDROMORPHONE HYDROCHLORIDE 1 MG: 1 INJECTION, SOLUTION INTRAMUSCULAR; INTRAVENOUS; SUBCUTANEOUS at 13:18

## 2025-02-04 RX ADMIN — HYDROMORPHONE HYDROCHLORIDE 1 MG: 1 INJECTION, SOLUTION INTRAMUSCULAR; INTRAVENOUS; SUBCUTANEOUS at 12:24

## 2025-02-04 RX ADMIN — SODIUM CHLORIDE, POTASSIUM CHLORIDE, SODIUM LACTATE AND CALCIUM CHLORIDE 1000 ML: 600; 310; 30; 20 INJECTION, SOLUTION INTRAVENOUS at 12:30

## 2025-02-04 RX ADMIN — KETOROLAC TROMETHAMINE 30 MG: 30 INJECTION, SOLUTION INTRAMUSCULAR at 12:24

## 2025-02-04 RX ADMIN — DIPHENHYDRAMINE HYDROCHLORIDE 50 MG: 25 CAPSULE ORAL at 12:24

## 2025-02-04 ASSESSMENT — ASTHMA QUESTIONNAIRES
EMERGENCY_ROOM_LAST_YEAR_TOTAL: THREE OR MORE
QUESTION_3 LAST FOUR WEEKS HOW OFTEN DID YOUR ASTHMA SYMPTOMS (WHEEZING, COUGHING, SHORTNESS OF BREATH, CHEST TIGHTNESS OR PAIN) WAKE YOU UP AT NIGHT OR EARLIER THAN USUAL IN THE MORNING: TWO OR THREE NIGHTS A WEEK
ACT_TOTALSCORE: 15
QUESTION_1 LAST FOUR WEEKS HOW MUCH OF THE TIME DID YOUR ASTHMA KEEP YOU FROM GETTING AS MUCH DONE AT WORK, SCHOOL OR AT HOME: NONE OF THE TIME
QUESTION_5 LAST FOUR WEEKS HOW WOULD YOU RATE YOUR ASTHMA CONTROL: SOMEWHAT CONTROLLED
QUESTION_2 LAST FOUR WEEKS HOW OFTEN HAVE YOU HAD SHORTNESS OF BREATH: THREE TO SIX TIMES A WEEK
ACT_TOTALSCORE: 15
HOSPITALIZATION_OVERNIGHT_LAST_YEAR_TOTAL: THREE OR MORE
QUESTION_4 LAST FOUR WEEKS HOW OFTEN HAVE YOU USED YOUR RESCUE INHALER OR NEBULIZER MEDICATION (SUCH AS ALBUTEROL): ONE OR TWO TIMES PER DAY

## 2025-02-04 NOTE — PROGRESS NOTES
"Dear patient. Thank you for visiting with me. I want you to feel respected, understood, and empowered. \"Respect\" is valuing you as much as I would a close family member. \"Empowerment\" happens when you are fully informed, and can make the best possible decision for you.  Please ask me questions!  Challenge anything that is not clear.    Medical records are primarily used as memory aids for me and my colleagues. Things to know about my documentation style:  - The 'problem list' includes current symptoms or diagnoses, and some problems that are resolved but may return. I use the past medical history for problems not expected to return.  - I use single quotation marks for things that you or I said, when I want to clarify who was speaking.  - I use double quotation marks when copying a term from elsewhere in your records. Italics (besides here) may also denote a quotation.  If you have questions or concerns, please contact me; I will reply as soon as time allows.    Jennifer Cervantes is a 24 year old woman, with concerns including:  Patient presents with:  Follow Up: Left knee pain for the past 2 month       PCP: Suraj Case   Visit type: problem-oriented      Assessment & Plan         Knee pain  R/o bone necrosis versus partial meniscal tear  Left knee pain, pops when moves, more pain and weakness moving and down on floor. Was packing some things for moving, and fell down in the middle due to knee problem.  No recalled knee injury.       OBJECTIVE: No inducible pain or instability       ASSESSMENT and PLAN: History sounds consistent with partial meniscal tear. Some limit in reassurance given recent infusion and risk from sickle cell. Will obtain plain film before PT. If XR is negative, will need MR to rule in meniscal tear and rule out bone abnormality from sickle cell.    ADDENDUM: XR negative, so MRI needed to rule out subtle bone disorder from sickle cell, guide PT in patient with chronic " pain.    Hypoxia  Crisis, was at infusion before this.  She blames mild hypoxia on pain meds. She wasn't feeling similar to previous ACS episodes.       OBJECTIVE: lungs are clear with no rales or rhonchi. No respiratory distress, able to do complete sentences.       ASSESSMENT and PLAN: Hypoxia uncertain etiology but I'm inclined to believe her theory about the breathing light because of opioids. I advised her that this isn't actually a good situation, and reminded her to take deep breaths when needed.  Based on history and available objective information, there seems to be no significant chance of more concerning differential diagnosis such as ACS.   Will check Hgb again.        Additional issues:  After a 1/31 botox shot and then developed arm and neck pain with muscle spasm, she involuntarily pulled away and her arm had to be pulled to stay in place.  Later this required rubbing with partial benefit.   Apparently had needle poke event with movement with botox shot.            Time note (e4, 30'): The total of my time (on the date of service) for this service was 30 minutes, including discussion/face-to-face, chart review, interpretation not otherwise reported, documentation, and updating of the computerized record.    The longitudinal plan of care for the diagnosis(es)/condition(s) as documented were addressed during this visit. Due to the added complexity in care, I will continue to support Jennifer in the subsequent management and with ongoing continuity of care.        Subjective   Jennifer is a 24 year old, presenting for the following health issues:  Follow Up (Left knee pain for the past 2 month )        2/4/2025     3:16 PM   Additional Questions   Roomed by      History of Present Illness       Reason for visit:  Knee pain    She eats 2-3 servings of fruits and vegetables daily.She consumes 0 sweetened beverage(s) daily.She exercises with enough effort to increase her heart rate 20 to 29 minutes per day.   "She exercises with enough effort to increase her heart rate 7 days per week.   She is taking medications regularly.             Objective    /70 (BP Location: Right arm, Patient Position: Sitting, Cuff Size: Adult Regular)   Pulse 91   Resp 14   Ht 1.626 m (5' 4.02\")   Wt 73.5 kg (162 lb 1.6 oz)   SpO2 (!) 90%   BMI 27.81 kg/m    Body mass index is 27.81 kg/m .  Physical Exam  Constitutional:       General: She is not in acute distress.     Appearance: Normal appearance. She is not ill-appearing.   HENT:      Head: Normocephalic.      Nose: Nose normal.   Eyes:      General: No scleral icterus.        Right eye: No discharge.         Left eye: No discharge.      Extraocular Movements: Extraocular movements intact.   Pulmonary:      Effort: Pulmonary effort is normal. No respiratory distress.      Breath sounds: Normal breath sounds.   Musculoskeletal:      Left knee: Normal. No swelling. No tenderness (although just post infusion).      Instability Tests: Anterior drawer test negative. Posterior drawer test negative.   Neurological:      Mental Status: She is alert.   Psychiatric:         Mood and Affect: Mood normal.         Behavior: Behavior normal.                    Signed Electronically by: Suraj Case MD    "

## 2025-02-04 NOTE — PROGRESS NOTES
Social Work - Transportation  [facility]    Data/Intervention:  Patient Name: Jennifer Cervantes Goes By: Jennifer MARTIN/Age: 2000 (24 year old)    Referral From: care team  Reason for Referral:  support requested for patient transportation needs for infusion appointment on 25 and follow up visit at [facility].  Assessment:  called [employer] to arrange ride through patient's insurance. [employer] arranged  for patient from home with [employer]. Patient will need to call [employer] when ready for return ride home.  Plan: Patient is aware of the transportation plan.  available to assist with any other needs.    CARLOS Ponce, [facility]    [facility]  [address] [place] [address]  Office: [phone]  Fax: [phone]  Gender Pronouns: She/Her  Employed by [employer]  Support Groups at [facility]: Social Work Services for Cancer Patients ([email or web address])  Employed by [employer] [facility] [employer]

## 2025-02-04 NOTE — TELEPHONE ENCOUNTER
Gary has opening at noon.     Call placed to Jennifer and she will take that appt.     She will need transportation to this appt. Message sent to [employer] to arrange transportation.

## 2025-02-04 NOTE — TELEPHONE ENCOUNTER
Oncology Nurse Triage - Sickle Cell Pain Crisis:    Situation: Jennifer  calling about Sickle Cell Pain Crisis, requesting to be added on for IV fluids and pain medicine    Background:     Patient's last infusion was 1/30/25  Last clinic visit date:1/31/25  Does patient have active treatment plan? Yes    Assessment of Symptoms:  Onset/Duration of symptoms: 5 day    Is it typical sickle cell pain? Yes  Location: arms and neck   Character: Sharp  Intensity: 10/10    Any radiation of pain, numbness, tingling, weakness, warmth, swelling, discoloration of arms or legs?No    Fever? No  (if yes max temperature recorded in last 24 hours):      Chest Pain Present: No    Shortness of breath: No    Other home therapies tried: HEAT/HEATING PAD and WARM BATH     Last home medication taken and when: 10pm ibuprofen     Any Refills Needed?: No    Does patient have transportation & length of time to get to clinic: Yes if there is an early opening otherwise needs help with transportation       Recommendations:     Placed on infusion call list     If you do not hear from the infusion center by 2pm then you will not be able to get in for an infusion today. If symptoms worsen while waiting for call back, and/or you experience fever, chills, SOB, chest pain, cough, n/v, dizziness, numbness, swelling, discoloration of extremities, then seek emergency evaluation in Emergency Department.     Please note, if you are late for your appt, you risk losing your infusion appt as it may delay another patient's infusion who arrived on time.

## 2025-02-04 NOTE — PROGRESS NOTES
Infusion Nursing Note:  Jennifer Cervantes presents today for IV fluids and pain meds.    Patient seen by provider today: No   present during visit today: Not Applicable.    Note: Pt reported pain as 10/10 at start of appointment. At conclusion of appt pain was 4/10.       Intravenous Access:  Implanted Port.    Treatment Conditions:  Not Applicable.      Post Infusion Assessment:  Patient tolerated infusion without incident.  Blood return noted pre and post infusion.  Site patent and intact, free from redness, edema or discomfort.  No evidence of extravasations.  Access discontinued per protocol.       Discharge Plan:   Discharge instructions reviewed with: Patient.  Patient and/or family verbalized understanding of discharge instructions and all questions answered.  Patient discharged in stable condition accompanied by: self.  Departure Mode: Ambulatory.      Ruth Baird RN

## 2025-02-05 DIAGNOSIS — D57.00 SICKLE CELL DISEASE WITH CRISIS (H): Primary | ICD-10-CM

## 2025-02-05 DIAGNOSIS — D57.00 SICKLE CELL DISEASE WITH CRISIS (H): ICD-10-CM

## 2025-02-05 LAB
ABO + RH BLD: NORMAL
BLD GP AB SCN SERPL QL: NEGATIVE
SPECIMEN EXP DATE BLD: NORMAL

## 2025-02-05 RX ORDER — OXYCODONE HYDROCHLORIDE 10 MG/1
10 TABLET ORAL EVERY 6 HOURS PRN
Qty: 12 TABLET | Refills: 0 | Status: SHIPPED | OUTPATIENT
Start: 2025-02-05

## 2025-02-05 NOTE — TELEPHONE ENCOUNTER
Narcotic Refill Request    Medication(s) requested:  oxycodone 10mg  Person Requesting Refill:adelaide (pt)   What pain is the medication treating: sickle cell pain, arms legs, back  How is the medication being taken?:goal of 4 tablets a day.   Does pt have enough for today? Pt is aware refill may not be signed and refill until tomorrow but wanted to get information in.   Is pain being adequately controlled on the current regimen?: okay, requires intermittent IVF/Pain at time  Experiencing any side effects from medication?: Denies    Date of most recent appointment:  1/31/2025 Vida LAIRD  Any No Show Visits:none recently  Next appointment:   2/27/2025 Patricia Mantilla CNP  Last fill date and by whom:  Patricia Mantilla CNP   Reviewed: No    Send to provider: routed to  last provider Vida Falcon

## 2025-02-06 ENCOUNTER — MYC MEDICAL ADVICE (OUTPATIENT)
Dept: INTERNAL MEDICINE | Facility: CLINIC | Age: 25
End: 2025-02-06

## 2025-02-06 ENCOUNTER — INFUSION THERAPY VISIT (OUTPATIENT)
Dept: TRANSPLANT | Facility: CLINIC | Age: 25
End: 2025-02-06
Attending: PEDIATRICS
Payer: COMMERCIAL

## 2025-02-06 ENCOUNTER — NURSE TRIAGE (OUTPATIENT)
Dept: ONCOLOGY | Facility: CLINIC | Age: 25
End: 2025-02-06
Payer: COMMERCIAL

## 2025-02-06 VITALS
RESPIRATION RATE: 16 BRPM | DIASTOLIC BLOOD PRESSURE: 85 MMHG | TEMPERATURE: 98.2 F | OXYGEN SATURATION: 93 % | SYSTOLIC BLOOD PRESSURE: 127 MMHG | HEART RATE: 90 BPM

## 2025-02-06 DIAGNOSIS — G81.10 SPASTIC HEMIPLEGIA, UNSPECIFIED ETIOLOGY, UNSPECIFIED LATERALITY (H): ICD-10-CM

## 2025-02-06 DIAGNOSIS — D57.00 SICKLE CELL PAIN CRISIS (H): Primary | ICD-10-CM

## 2025-02-06 DIAGNOSIS — D57.00 SICKLE CELL DISEASE WITH CRISIS (H): ICD-10-CM

## 2025-02-06 PROBLEM — D57.01 ACUTE CHEST SYNDROME (H): Status: RESOLVED | Noted: 2021-10-19 | Resolved: 2025-02-06

## 2025-02-06 PROBLEM — J96.01 ACUTE RESPIRATORY FAILURE WITH HYPOXIA (H): Status: RESOLVED | Noted: 2021-07-13 | Resolved: 2025-02-06

## 2025-02-06 PROBLEM — R09.02 HYPOXIA: Status: ACTIVE | Noted: 2024-08-16

## 2025-02-06 PROBLEM — G89.29 CHRONIC PAIN OF LEFT KNEE: Status: ACTIVE | Noted: 2025-02-06

## 2025-02-06 PROBLEM — M25.562 CHRONIC PAIN OF LEFT KNEE: Status: ACTIVE | Noted: 2025-02-06

## 2025-02-06 PROBLEM — R06.09 DYSPNEA ON EXERTION: Status: ACTIVE | Noted: 2022-08-20

## 2025-02-06 LAB
BASOPHILS # BLD AUTO: 0.3 10E3/UL (ref 0–0.2)
BASOPHILS NFR BLD AUTO: 3 %
EOSINOPHIL # BLD AUTO: 0.5 10E3/UL (ref 0–0.7)
EOSINOPHIL NFR BLD AUTO: 5 %
ERYTHROCYTE [DISTWIDTH] IN BLOOD BY AUTOMATED COUNT: 24.7 % (ref 10–15)
HCT VFR BLD AUTO: 19.9 % (ref 35–47)
HGB BLD-MCNC: 7.1 G/DL (ref 11.7–15.7)
IMM GRANULOCYTES # BLD: 0.1 10E3/UL
IMM GRANULOCYTES NFR BLD: 1 %
LYMPHOCYTES # BLD AUTO: 2.1 10E3/UL (ref 0.8–5.3)
LYMPHOCYTES NFR BLD AUTO: 19 %
MCH RBC QN AUTO: 29.1 PG (ref 26.5–33)
MCHC RBC AUTO-ENTMCNC: 35.7 G/DL (ref 31.5–36.5)
MCV RBC AUTO: 82 FL (ref 78–100)
MONOCYTES # BLD AUTO: 1 10E3/UL (ref 0–1.3)
MONOCYTES NFR BLD AUTO: 8 %
NEUTROPHILS # BLD AUTO: 7.5 10E3/UL (ref 1.6–8.3)
NEUTROPHILS NFR BLD AUTO: 66 %
NRBC # BLD AUTO: 0.1 10E3/UL
NRBC BLD AUTO-RTO: 1 /100
PLATELET # BLD AUTO: 460 10E3/UL (ref 150–450)
RBC # BLD AUTO: 2.44 10E6/UL (ref 3.8–5.2)
WBC # BLD AUTO: 11.4 10E3/UL (ref 4–11)

## 2025-02-06 PROCEDURE — 258N000003 HC RX IP 258 OP 636: Performed by: PEDIATRICS

## 2025-02-06 PROCEDURE — 86900 BLOOD TYPING SEROLOGIC ABO: CPT

## 2025-02-06 PROCEDURE — 250N000011 HC RX IP 250 OP 636: Performed by: PEDIATRICS

## 2025-02-06 PROCEDURE — 86850 RBC ANTIBODY SCREEN: CPT

## 2025-02-06 PROCEDURE — 250N000013 HC RX MED GY IP 250 OP 250 PS 637: Performed by: PEDIATRICS

## 2025-02-06 PROCEDURE — 36415 COLL VENOUS BLD VENIPUNCTURE: CPT

## 2025-02-06 PROCEDURE — 85025 COMPLETE CBC W/AUTO DIFF WBC: CPT

## 2025-02-06 RX ORDER — HEPARIN SODIUM,PORCINE 10 UNIT/ML
5 VIAL (ML) INTRAVENOUS
OUTPATIENT
Start: 2025-04-01

## 2025-02-06 RX ORDER — KETOROLAC TROMETHAMINE 30 MG/ML
30 INJECTION, SOLUTION INTRAMUSCULAR; INTRAVENOUS ONCE
Status: COMPLETED | OUTPATIENT
Start: 2025-02-06 | End: 2025-02-06

## 2025-02-06 RX ORDER — DIPHENHYDRAMINE HCL 25 MG
50 CAPSULE ORAL
Status: COMPLETED | OUTPATIENT
Start: 2025-02-06 | End: 2025-02-06

## 2025-02-06 RX ORDER — ONDANSETRON 4 MG/1
8 TABLET, FILM COATED ORAL
Start: 2025-04-01

## 2025-02-06 RX ORDER — ONDANSETRON 2 MG/ML
8 INJECTION INTRAMUSCULAR; INTRAVENOUS EVERY 6 HOURS PRN
Start: 2025-04-01

## 2025-02-06 RX ORDER — KETOROLAC TROMETHAMINE 30 MG/ML
30 INJECTION, SOLUTION INTRAMUSCULAR; INTRAVENOUS ONCE
Start: 2025-04-01 | End: 2025-04-01

## 2025-02-06 RX ORDER — DIPHENHYDRAMINE HCL 25 MG
50 CAPSULE ORAL
Start: 2025-04-01

## 2025-02-06 RX ORDER — HEPARIN SODIUM (PORCINE) LOCK FLUSH IV SOLN 100 UNIT/ML 100 UNIT/ML
5 SOLUTION INTRAVENOUS
OUTPATIENT
Start: 2025-04-01

## 2025-02-06 RX ORDER — HEPARIN SODIUM (PORCINE) LOCK FLUSH IV SOLN 100 UNIT/ML 100 UNIT/ML
5 SOLUTION INTRAVENOUS
Status: DISCONTINUED | OUTPATIENT
Start: 2025-02-06 | End: 2025-02-06 | Stop reason: HOSPADM

## 2025-02-06 RX ORDER — ONDANSETRON 2 MG/ML
8 INJECTION INTRAMUSCULAR; INTRAVENOUS EVERY 6 HOURS PRN
Status: DISCONTINUED | OUTPATIENT
Start: 2025-02-06 | End: 2025-02-06 | Stop reason: HOSPADM

## 2025-02-06 RX ADMIN — SODIUM CHLORIDE, POTASSIUM CHLORIDE, SODIUM LACTATE AND CALCIUM CHLORIDE 1000 ML: 600; 310; 30; 20 INJECTION, SOLUTION INTRAVENOUS at 10:00

## 2025-02-06 RX ADMIN — DIPHENHYDRAMINE HYDROCHLORIDE 50 MG: 25 CAPSULE ORAL at 09:56

## 2025-02-06 RX ADMIN — HYDROMORPHONE HYDROCHLORIDE 1 MG: 1 INJECTION, SOLUTION INTRAMUSCULAR; INTRAVENOUS; SUBCUTANEOUS at 10:54

## 2025-02-06 RX ADMIN — HYDROMORPHONE HYDROCHLORIDE 1 MG: 1 INJECTION, SOLUTION INTRAMUSCULAR; INTRAVENOUS; SUBCUTANEOUS at 11:54

## 2025-02-06 RX ADMIN — KETOROLAC TROMETHAMINE 30 MG: 30 INJECTION, SOLUTION INTRAMUSCULAR; INTRAVENOUS at 09:57

## 2025-02-06 RX ADMIN — HYDROMORPHONE HYDROCHLORIDE 1 MG: 1 INJECTION, SOLUTION INTRAMUSCULAR; INTRAVENOUS; SUBCUTANEOUS at 09:51

## 2025-02-06 RX ADMIN — HEPARIN 5 ML: 100 SYRINGE at 13:31

## 2025-02-06 RX ADMIN — ONDANSETRON 8 MG: 2 INJECTION INTRAMUSCULAR; INTRAVENOUS at 10:39

## 2025-02-06 ASSESSMENT — PAIN SCALES - GENERAL: PAINLEVEL_OUTOF10: SEVERE PAIN (9)

## 2025-02-06 NOTE — PROGRESS NOTES
Infusion Nursing Note:  Jennifer Cervantes presents today for IVF and pain meds.    Patient seen by provider today: No   present during visit today: Not Applicable.    Note: Lab results monitored. Allergies and home medications reviewed. Pt received 1L of LR, x3 IVP Dilaudid, x1 Toradol, IV Zofran, and PO Benadryl.   Pt expressed pain in lower back and arms today, pain rated 9/10 upon arrival and 5/10 upon discharge.   Pt discharged with no further concerns.     Intravenous Access:  Labs drawn without difficulty.  Port.    Treatment Conditions:  Lab Results   Component Value Date    HGB 7.1 (L) 02/06/2025    WBC 11.4 (H) 02/06/2025    ANEU 8.5 (H) 12/30/2024    ANEUTAUTO 7.5 02/06/2025     (H) 02/06/2025        Lab Results   Component Value Date     01/30/2025    POTASSIUM 3.9 01/30/2025    MAG 2.0 05/16/2024    CR 0.54 01/30/2025    MICAH 8.9 01/30/2025    BILITOTAL 2.0 (H) 01/24/2025    ALBUMIN 4.2 01/24/2025    ALT 19 01/24/2025    AST 40 01/24/2025       Results reviewed, labs MET treatment parameters, ok to proceed with treatment.      Post Infusion Assessment:  Patient tolerated infusion without incident.  Blood return noted pre and post infusion.       Discharge Plan:   Patient and/or family verbalized understanding of discharge instructions and all questions answered.  Patient discharged in stable condition accompanied by: self.      Radha Galindo RN

## 2025-02-06 NOTE — ASSESSMENT & PLAN NOTE
Hypoxia  Crisis, was at infusion before this.  She blames mild hypoxia on pain meds. She wasn't feeling similar to previous ACS episodes.       OBJECTIVE: lungs are clear with no rales or rhonchi. No respiratory distress, able to do complete sentences.       ASSESSMENT and PLAN: Hypoxia uncertain etiology but I'm inclined to believe her theory about the breathing light because of opioids. I advised her that this isn't actually a good situation, and reminded her to take deep breaths when needed.  Based on history and available objective information, there seems to be no significant chance of more concerning differential diagnosis such as ACS.

## 2025-02-06 NOTE — TELEPHONE ENCOUNTER
Jennifer is calling to say that she will be here at 0916, maybe sooner. States she had to go home for something and was on her way home when Triage called to offer her an apt, so she will be a little late. Apt is at 0900. This writer sent message to Infusion Charge Nurse to notify her.

## 2025-02-06 NOTE — CONFIDENTIAL NOTE
Coordinators please contact patient, and:  -- mention the mychart I sent  -- Help her schedule the MRI.

## 2025-02-06 NOTE — TELEPHONE ENCOUNTER
Oncology Nurse Triage - Sickle Cell Pain Crisis:    Situation: Jennifer  calling about Sickle Cell Pain Crisis, requesting to be added on for IV fluids and pain medicine    Background:   Patient's last infusion was 2/4/25  Last clinic visit date:1/31/25 w/ Vida Falcon  Does patient have active treatment plan? Yes    Assessment of Symptoms:  Onset/Duration of symptoms: 2 day    Is it typical sickle cell pain? No  Location: arms, lower back  Character: Sharp  Intensity: 9/10    Any radiation of pain, numbness, tingling, weakness, warmth, swelling, discoloration of arms or legs?Yes    Fever? No  (if yes max temperature recorded in last 24 hours):      Chest Pain Present: No    Shortness of breath: No    Other home therapies tried: HEAT/HEATING PAD and WARM BATH     Last home medication taken and when: yesterday around 10pm Oxycodone and IBU     Any Refills Needed?: No    Does patient have transportation & length of time to get to clinic: Yes- if early appt, can find a ride. She plans to come  her prescription early this morning, also has appt at 1130 for labs, so can come any time.     Recommendations:   Added to infusion wait list.   [phone] call to pt to offer 9am infusion appt in BMT today. Pt said she may be a few minutes late, but will come at 9am and will call if she will be more than 10 minutes late. Radha BMT nurse, updated.   [phone] message sent to scheduling.

## 2025-02-06 NOTE — ASSESSMENT & PLAN NOTE
Knee pain  R/o bone necrosis versus partial meniscal tear  Left knee pain, pops when moves, more pain and weakness moving and down on floor. Was packing some things for moving, and fell down in the middle due to knee problem.  No recalled knee injury.       OBJECTIVE: No inducible pain or instability       ASSESSMENT and PLAN: History sounds consistent with partial meniscal tear. Some limit in reassurance given recent infusion and risk from sickle cell. Will obtain plain film before PT. If XR is negative, will need MR to rule in meniscal tear and rule out bone abnormality from sickle cell.

## 2025-02-08 ENCOUNTER — APPOINTMENT (OUTPATIENT)
Dept: GENERAL RADIOLOGY | Facility: CLINIC | Age: 25
End: 2025-02-08
Attending: EMERGENCY MEDICINE
Payer: COMMERCIAL

## 2025-02-08 ENCOUNTER — HOSPITAL ENCOUNTER (EMERGENCY)
Facility: CLINIC | Age: 25
Discharge: HOME | End: 2025-02-08
Attending: EMERGENCY MEDICINE | Admitting: EMERGENCY MEDICINE
Payer: COMMERCIAL

## 2025-02-08 ENCOUNTER — APPOINTMENT (OUTPATIENT)
Dept: ULTRASOUND IMAGING | Facility: CLINIC | Age: 25
End: 2025-02-08
Attending: EMERGENCY MEDICINE
Payer: COMMERCIAL

## 2025-02-08 VITALS
HEART RATE: 77 BPM | DIASTOLIC BLOOD PRESSURE: 53 MMHG | RESPIRATION RATE: 15 BRPM | TEMPERATURE: 98.3 F | OXYGEN SATURATION: 98 % | SYSTOLIC BLOOD PRESSURE: 115 MMHG

## 2025-02-08 DIAGNOSIS — D57.00 SICKLE CELL PAIN CRISIS (H): ICD-10-CM

## 2025-02-08 LAB
ANION GAP SERPL CALCULATED.3IONS-SCNC: 13 MMOL/L (ref 7–15)
ATRIAL RATE - MUSE: 68 BPM
BASOPHILS # BLD AUTO: 0.3 10E3/UL (ref 0–0.2)
BASOPHILS NFR BLD AUTO: 3 %
BUN SERPL-MCNC: 7.5 MG/DL (ref 6–20)
CALCIUM SERPL-MCNC: 8.9 MG/DL (ref 8.8–10.4)
CHLORIDE SERPL-SCNC: 105 MMOL/L (ref 98–107)
CREAT SERPL-MCNC: 0.55 MG/DL (ref 0.51–0.95)
D DIMER PPP FEU-MCNC: 1.35 UG/ML FEU (ref 0–0.5)
DIASTOLIC BLOOD PRESSURE - MUSE: NORMAL MMHG
EGFRCR SERPLBLD CKD-EPI 2021: >90 ML/MIN/1.73M2
EOSINOPHIL # BLD AUTO: 0.4 10E3/UL (ref 0–0.7)
EOSINOPHIL NFR BLD AUTO: 3 %
ERYTHROCYTE [DISTWIDTH] IN BLOOD BY AUTOMATED COUNT: 26.5 % (ref 10–15)
FLUAV RNA SPEC QL NAA+PROBE: NEGATIVE
FLUBV RNA RESP QL NAA+PROBE: NEGATIVE
GLUCOSE SERPL-MCNC: 78 MG/DL (ref 70–99)
HCO3 SERPL-SCNC: 22 MMOL/L (ref 22–29)
HCT VFR BLD AUTO: 18.6 % (ref 35–47)
HGB BLD-MCNC: 6.8 G/DL (ref 11.7–15.7)
HOLD SPECIMEN: NORMAL
HOLD SPECIMEN: NORMAL
IMM GRANULOCYTES # BLD: 0.1 10E3/UL
IMM GRANULOCYTES NFR BLD: 1 %
INR PPP: 1.16 (ref 0.85–1.15)
INTERPRETATION ECG - MUSE: NORMAL
LYMPHOCYTES # BLD AUTO: 2.9 10E3/UL (ref 0.8–5.3)
LYMPHOCYTES NFR BLD AUTO: 22 %
MCH RBC QN AUTO: 30.4 PG (ref 26.5–33)
MCHC RBC AUTO-ENTMCNC: 36.6 G/DL (ref 31.5–36.5)
MCV RBC AUTO: 83 FL (ref 78–100)
MONOCYTES # BLD AUTO: 1 10E3/UL (ref 0–1.3)
MONOCYTES NFR BLD AUTO: 8 %
NEUTROPHILS # BLD AUTO: 8.6 10E3/UL (ref 1.6–8.3)
NEUTROPHILS NFR BLD AUTO: 65 %
NRBC # BLD AUTO: 0.4 10E3/UL
NRBC BLD AUTO-RTO: 3 /100
NT-PROBNP SERPL-MCNC: 159 PG/ML (ref 0–450)
P AXIS - MUSE: 73 DEGREES
PLATELET # BLD AUTO: 421 10E3/UL (ref 150–450)
POTASSIUM SERPL-SCNC: 3.6 MMOL/L (ref 3.4–5.3)
PR INTERVAL - MUSE: 168 MS
QRS DURATION - MUSE: 74 MS
QT - MUSE: 432 MS
QTC - MUSE: 459 MS
R AXIS - MUSE: 38 DEGREES
RBC # BLD AUTO: 2.24 10E6/UL (ref 3.8–5.2)
RETICS # AUTO: 0.48 10E6/UL (ref 0.03–0.1)
RETICS/RBC NFR AUTO: 21.3 % (ref 0.5–2)
RSV RNA SPEC NAA+PROBE: NEGATIVE
SARS-COV-2 RNA RESP QL NAA+PROBE: NEGATIVE
SODIUM SERPL-SCNC: 140 MMOL/L (ref 135–145)
SYSTOLIC BLOOD PRESSURE - MUSE: NORMAL MMHG
T AXIS - MUSE: -4 DEGREES
TROPONIN T SERPL HS-MCNC: <6 NG/L
VENTRICULAR RATE- MUSE: 68 BPM
WBC # BLD AUTO: 13.3 10E3/UL (ref 4–11)

## 2025-02-08 PROCEDURE — 99285 EMERGENCY DEPT VISIT HI MDM: CPT | Mod: 25 | Performed by: EMERGENCY MEDICINE

## 2025-02-08 PROCEDURE — 93005 ELECTROCARDIOGRAM TRACING: CPT | Performed by: EMERGENCY MEDICINE

## 2025-02-08 PROCEDURE — 85018 HEMOGLOBIN: CPT | Performed by: EMERGENCY MEDICINE

## 2025-02-08 PROCEDURE — 85379 FIBRIN DEGRADATION QUANT: CPT | Performed by: EMERGENCY MEDICINE

## 2025-02-08 PROCEDURE — 85004 AUTOMATED DIFF WBC COUNT: CPT | Performed by: EMERGENCY MEDICINE

## 2025-02-08 PROCEDURE — 96375 TX/PRO/DX INJ NEW DRUG ADDON: CPT | Performed by: EMERGENCY MEDICINE

## 2025-02-08 PROCEDURE — 93010 ELECTROCARDIOGRAM REPORT: CPT | Performed by: EMERGENCY MEDICINE

## 2025-02-08 PROCEDURE — 80048 BASIC METABOLIC PNL TOTAL CA: CPT | Performed by: EMERGENCY MEDICINE

## 2025-02-08 PROCEDURE — 87637 SARSCOV2&INF A&B&RSV AMP PRB: CPT | Performed by: EMERGENCY MEDICINE

## 2025-02-08 PROCEDURE — 250N000011 HC RX IP 250 OP 636: Performed by: EMERGENCY MEDICINE

## 2025-02-08 PROCEDURE — 96374 THER/PROPH/DIAG INJ IV PUSH: CPT | Performed by: EMERGENCY MEDICINE

## 2025-02-08 PROCEDURE — 71046 X-RAY EXAM CHEST 2 VIEWS: CPT

## 2025-02-08 PROCEDURE — 36415 COLL VENOUS BLD VENIPUNCTURE: CPT | Performed by: EMERGENCY MEDICINE

## 2025-02-08 PROCEDURE — 96361 HYDRATE IV INFUSION ADD-ON: CPT | Performed by: EMERGENCY MEDICINE

## 2025-02-08 PROCEDURE — 99285 EMERGENCY DEPT VISIT HI MDM: CPT | Performed by: EMERGENCY MEDICINE

## 2025-02-08 PROCEDURE — 85048 AUTOMATED LEUKOCYTE COUNT: CPT | Performed by: EMERGENCY MEDICINE

## 2025-02-08 PROCEDURE — 83880 ASSAY OF NATRIURETIC PEPTIDE: CPT | Performed by: EMERGENCY MEDICINE

## 2025-02-08 PROCEDURE — 85045 AUTOMATED RETICULOCYTE COUNT: CPT | Performed by: EMERGENCY MEDICINE

## 2025-02-08 PROCEDURE — 84484 ASSAY OF TROPONIN QUANT: CPT | Performed by: EMERGENCY MEDICINE

## 2025-02-08 PROCEDURE — 96376 TX/PRO/DX INJ SAME DRUG ADON: CPT | Performed by: EMERGENCY MEDICINE

## 2025-02-08 PROCEDURE — 93971 EXTREMITY STUDY: CPT | Mod: LT

## 2025-02-08 PROCEDURE — 258N000003 HC RX IP 258 OP 636: Performed by: EMERGENCY MEDICINE

## 2025-02-08 PROCEDURE — 85610 PROTHROMBIN TIME: CPT | Performed by: EMERGENCY MEDICINE

## 2025-02-08 RX ORDER — ONDANSETRON 2 MG/ML
8 INJECTION INTRAMUSCULAR; INTRAVENOUS
Status: COMPLETED | OUTPATIENT
Start: 2025-02-08 | End: 2025-02-08

## 2025-02-08 RX ORDER — KETOROLAC TROMETHAMINE 30 MG/ML
30 INJECTION, SOLUTION INTRAMUSCULAR; INTRAVENOUS ONCE
Status: COMPLETED | OUTPATIENT
Start: 2025-02-08 | End: 2025-02-08

## 2025-02-08 RX ORDER — HEPARIN SODIUM (PORCINE) LOCK FLUSH IV SOLN 100 UNIT/ML 100 UNIT/ML
5-10 SOLUTION INTRAVENOUS
Status: DISCONTINUED | OUTPATIENT
Start: 2025-02-08 | End: 2025-02-08 | Stop reason: HOSPADM

## 2025-02-08 RX ORDER — HEPARIN SODIUM,PORCINE 10 UNIT/ML
5-10 VIAL (ML) INTRAVENOUS
Status: DISCONTINUED | OUTPATIENT
Start: 2025-02-08 | End: 2025-02-08 | Stop reason: HOSPADM

## 2025-02-08 RX ORDER — HEPARIN SODIUM,PORCINE 10 UNIT/ML
5-10 VIAL (ML) INTRAVENOUS EVERY 24 HOURS
Status: DISCONTINUED | OUTPATIENT
Start: 2025-02-08 | End: 2025-02-08 | Stop reason: HOSPADM

## 2025-02-08 RX ADMIN — HYDROMORPHONE HYDROCHLORIDE 1 MG: 1 INJECTION, SOLUTION INTRAMUSCULAR; INTRAVENOUS; SUBCUTANEOUS at 06:17

## 2025-02-08 RX ADMIN — KETOROLAC TROMETHAMINE 30 MG: 30 INJECTION, SOLUTION INTRAMUSCULAR at 03:34

## 2025-02-08 RX ADMIN — HYDROMORPHONE HYDROCHLORIDE 1 MG: 1 INJECTION, SOLUTION INTRAMUSCULAR; INTRAVENOUS; SUBCUTANEOUS at 05:07

## 2025-02-08 RX ADMIN — HEPARIN SODIUM (PORCINE) LOCK FLUSH IV SOLN 100 UNIT/ML 5 ML: 100 SOLUTION at 07:10

## 2025-02-08 RX ADMIN — SODIUM CHLORIDE, POTASSIUM CHLORIDE, SODIUM LACTATE AND CALCIUM CHLORIDE 1000 ML: 600; 310; 30; 20 INJECTION, SOLUTION INTRAVENOUS at 03:46

## 2025-02-08 RX ADMIN — ONDANSETRON 8 MG: 2 INJECTION INTRAMUSCULAR; INTRAVENOUS at 03:42

## 2025-02-08 RX ADMIN — HYDROMORPHONE HYDROCHLORIDE 1 MG: 1 INJECTION, SOLUTION INTRAMUSCULAR; INTRAVENOUS; SUBCUTANEOUS at 03:34

## 2025-02-08 ASSESSMENT — ACTIVITIES OF DAILY LIVING (ADL)
ADLS_ACUITY_SCORE: 58
ADLS_ACUITY_SCORE: 59
ADLS_ACUITY_SCORE: 59
ADLS_ACUITY_SCORE: 58
ADLS_ACUITY_SCORE: 58
ADLS_ACUITY_SCORE: 59
ADLS_ACUITY_SCORE: 58

## 2025-02-08 NOTE — ED TRIAGE NOTES
Pt is experiencing leg pain and back pain consistent with her previous sickle cell pain. Pt states she has taken her oxycodone (2hr ago), ibuprofen (2hr ago), and muscle relaxer (1hr ago).

## 2025-02-08 NOTE — ED PROVIDER NOTES
ED Provider Note  [facility]      History     Chief Complaint   Patient presents with    Sickle Cell Pain Crisis     HPI  Jennifer Cervantes is a 24 year old female who has a history of sickle cell disease.  She has been feeling unwell for approximately 2 days.  She has significant pain in her left leg from the lower leg to the thigh.  Feels it is slightly swollen.  No personal or family history of DVT or PE that she is aware of.  She is not anticoagulated.  She is trying to manage her pain with her home pain medications without improvement.  She is concerned because she states that her outpatient labs this week have been showing her to have anemia.  In addition she had mild hypoxia at 1 for clinic visits that seem to improve with deep breathing.  On ED evaluation today she continues to have mild hypoxia 88 to 90% on room air placed on 2 to 3 L O2 by nasal cannula improvement.     Past Medical History  Past Medical History:   Diagnosis Date    Anxiety     Bleeding disorder     Blood clotting disorder     Cerebral infarction (H) 2015    Cognitive developmental delay     low IQ. 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 side (H)     right hand contractures    Iron overload due to repeated red blood cell transfusions     Migraines     Multiple subsegmental pulmonary emboli without acute cor pulmonale (H) 02/01/2021    Oppositional defiant behavior     Presence of intrauterine contraceptive device 2/18/2020    Superficial venous thrombosis of arm, right 03/25/2021    Uncomplicated asthma      Past Surgical History:   Procedure Laterality Date    AS INSERT TUNNELED CV 2 CATH W/O PORT/PUMP      CHOLECYSTECTOMY      CV RIGHT HEART CATH MEASUREMENTS RECORDED N/A 11/18/2021    Procedure: Right Heart Cath;  Surgeon: Jackson Stauffer MD;  Location:  HEART CARDIAC CATH LAB     INSERT PORT VASCULAR ACCESS Left 4/21/2021    Procedure: INSERTION, VASCULAR ACCESS PORT (NOT SURE ON SIDE UNTIL REMOVAL);  Surgeon: Rajan More MD;  Location: [facility] OR    IR CHEST PORT PLACEMENT > 5 YRS OF AGE  4/21/2021    IR CVC NON TUNNEL LINE REMOVAL  5/6/2021    IR CVC NON TUNNEL PLACEMENT > 5 YRS  04/07/2020    IR CVC NON TUNNEL PLACEMENT > 5 YRS  4/30/2021    IR CVC NON TUNNEL PLACEMENT > 5 YRS  9/7/2022    IR PORT REMOVAL LEFT  4/21/2021    REMOVE PORT VASCULAR ACCESS Left 4/21/2021    Procedure: REMOVAL, VASCULAR ACCESS PORT LEFT;  Surgeon: Rajan More MD;  Location: [facility] OR    REPAIR TENDON ELBOW Right 10/02/2019    Procedure: Right Elbow Flexor Lengthening, Flexor Pronator Slide Of Wrist and Finger, Thumb Adductor Lengthening;  Surgeon: Anai Franco MD;  Location: [facility] OR    TONSILLECTOMY Bilateral 10/02/2019    Procedure: Bilateral Tonsillectomy;  Surgeon: Farhana Guy MD;  Location: [facility] OR    ZZC BREAST REDUCTION (INCLUDES LIPO) TIER 3 Bilateral 04/23/2019     albuterol (PROVENTIL) (2.5 MG/3ML) 0.083% neb solution  aspirin (ASA) 81 MG chewable tablet  budesonide-formoterol (SYMBICORT/BREYNA) 160-4.5 MCG/ACT Inhaler  deferasirox (JADENU) 360 MG tablet  Deferiprone, Twice Daily, 1000 MG TABS  diphenhydrAMINE (BENADRYL) 50 MG capsule  EPINEPHrine (ANY BX GENERIC EQUIV) 0.3 MG/0.3ML injection 2-pack  gabapentin (NEURONTIN) 300 MG capsule  hydroxyurea (HYDREA) 500 MG capsule  ibuprofen (ADVIL/MOTRIN) 800 MG tablet  methocarbamol (ROBAXIN) 750 MG tablet  methylPREDNISolone (MEDROL) 32 MG tablet  naloxone (NARCAN) 4 MG/0.1ML nasal spray  naproxen (NAPROSYN) 500 MG tablet  oxyCODONE IR (ROXICODONE) 10 MG tablet      Allergies   Allergen Reactions    Contrast Dye Angioedema     Hives and breathing issues    Fish-Derived Products Hives    Seafood Hives    Adhesive Tape Hives     Primipore dressing causes hives    Gadolinium     Iodinated Contrast Media      Family History  Family  History   Problem Relation Age of Onset    Sickle Cell Trait Mother     Hypertension Mother     Asthma Mother     Sickle Cell Trait Father     Glaucoma No family hx of     Macular Degeneration No family hx of     Diabetes No family hx of     Gout No family hx of      Social History   Social History     Tobacco Use    Smoking status: Never     Passive exposure: Never    Smokeless tobacco: Never   Substance Use Topics    Alcohol use: Not Currently     Alcohol/week: 0.0 standard drinks of alcohol    Drug use: Never      A medically appropriate review of systems was performed with pertinent positives and negatives noted in the HPI, and all other systems negative.    Physical Exam   BP: (!) 147/86  Pulse: 97  Temp: 98.3  F (36.8  C)  Resp: 15  SpO2: (!) 91 %    Physical Exam  Gen:A&Ox3, tearful  HEENT:PERRL, no facial tenderness or wounds, head atraumatic, oropharynx clear, mucous membranes moist, TMs clear bilaterally  Neck:no bony tenderness or step offs, no JVD, trachea midline  Back: no CVA tenderness, no midline bony tenderness  CV:RRR without murmurs  PULM:Clear to auscultation bilaterally, RR and work of breathing normal.   Abd:soft, nontender, nondistended. Bowel sounds present and normal  UE:No traumatic injuries, skin normal  LE: + DP and PT pulses. Trace edema in feet.   Neuro:CN II-XII intact, strength 5/5 throughout  Skin: no rashes or ecchymoses    ED Course, Procedures, & Data      Procedures            EKG Interpretation:      Interpreted by Cornelia Malloy MD  Time reviewed: 4:37 AM  Symptoms at time of EKG: leg pain, hypoxia  Rhythm: normal sinus   Rate: 68  Axis: normal  Ectopy: none  Conduction: normal  ST Segments/ T Waves: No ST-T wave changes  Q Waves: none  Comparison to prior: Unchanged    Clinical Impression: normal EKG       Results for orders placed or performed during the hospital encounter of 02/08/25   US Lower Extremity Venous Duplex Left     Status: None    Narrative    EXAM: US LOWER  EXTREMITY VENOUS DUPLEX LEFT  LOCATION: [facility]  DATE: 2/8/2025    INDICATION: Leg pain and hypoxia.  COMPARISON: 08/31/2024.  TECHNIQUE: Venous Duplex ultrasound of the left lower extremity with and without compression, augmentation and duplex. Color flow and spectral Doppler with waveform analysis performed.    FINDINGS: Exam includes the common femoral, femoral, popliteal, and contralateral common femoral veins as well as segmentally visualized deep calf veins and greater saphenous vein.     LEFT: No deep vein thrombosis. No superficial thrombophlebitis. No popliteal cyst.      Impression    IMPRESSION:  1.  No deep venous thrombosis in the left lower extremity.   Chest XR,  PA & LAT     Status: None    Narrative    EXAM: XR CHEST 2 VIEWS  LOCATION: [facility]  DATE: 2/8/2025    INDICATION: chest pain and dyspnea  COMPARISON: 2/4/2025.      Impression    IMPRESSION: Left internal jugular venous Port-A-Cath terminates near the superior cavoatrial junction. Mild elevation of the right hemidiaphragm. Lungs appear clear. Normal heart size and pulmonary vascularity.   [place] Draw     Status: None    Narrative    The following orders were created for panel order [place] Draw.  Procedure                               Abnormality         Status                     ---------                               -----------         ------                     Extra Green Top (Lithium...[[id number]]                      Final result               Extra Purple Top Tube[[id number]]                            Final result                 Please view results for these tests on the individual orders.   Extra Green Top (Lithium Heparin) Tube     Status: None   Result Value Ref Range    Hold Specimen JIC    Extra Purple Top Tube     Status: None   Result Value Ref Range    Hold Specimen JIC    Basic metabolic panel     Status: Normal   Result  Value Ref Range    Sodium 140 135 - 145 mmol/L    Potassium 3.6 3.4 - 5.3 mmol/L    Chloride 105 98 - 107 mmol/L    Carbon Dioxide (CO2) 22 22 - 29 mmol/L    Anion Gap 13 7 - 15 mmol/L    Urea Nitrogen 7.5 6.0 - 20.0 mg/dL    Creatinine 0.55 0.51 - 0.95 mg/dL    GFR Estimate >90 >60 mL/min/1.73m2    Calcium 8.9 8.8 - 10.4 mg/dL    Glucose 78 70 - 99 mg/dL   Reticulocyte count     Status: Abnormal   Result Value Ref Range    % Reticulocyte 21.3 (H) 0.5 - 2.0 %    Absolute Reticulocyte 0.479 (H) 0.025 - 0.095 10e6/uL   INR     Status: Abnormal   Result Value Ref Range    INR 1.16 (H) 0.85 - 1.15   Troponin T, High Sensitivity     Status: Normal   Result Value Ref Range    Troponin T, High Sensitivity <6 <=14 ng/L   D dimer quantitative     Status: Abnormal   Result Value Ref Range    D-Dimer Quantitative 1.35 (H) 0.00 - 0.50 ug/mL FEU    Narrative    This D-dimer assay is intended for use in conjunction with a clinical pretest probability assessment model to exclude pulmonary embolism (PE) and deep venous thrombosis (DVT) in outpatients suspected of PE or DVT. The cut-off value is 0.50 ug/mL FEU.   Influenza A/B, RSV and SARS-CoV2 PCR (COVID-19) Nose     Status: Normal    Specimen: Nose; Swab   Result Value Ref Range    Influenza A PCR Negative Negative    Influenza B PCR Negative Negative    RSV PCR Negative Negative    SARS CoV2 PCR Negative Negative    Narrative    Testing was performed using the Xpert Xpress CoV2/Flu/RSV Assay on the [employer] GeneXpert Instrument. This test should be ordered for the detection of SARS-CoV2, influenza, and RSV viruses in individuals with signs and symptoms of respiratory tract infection. This test is for in vitro diagnostic use under the [place] FDA for laboratories certified under CLIA to perform high or moderate complexity testing. This test has been [place] FDA cleared. A negative result does not rule out the presence of PCR inhibitors in the specimen or target RNA in concentration below  the limit of detection for the assay. If only one viral target is positive but coinfection with multiple targets is suspected, the sample should be re-tested with another FDA cleared, approved, or authorized test, if coninfection would change clinical management. This test was validated by the [facility]. These laboratories are certified under the Clinical Laboratory Improvement Amendments of 1988 (CLIA-88) as qualified to perfom high complexity laboratory testing.   Nt probnp inpatient     Status: Normal   Result Value Ref Range    N terminal Pro BNP Inpatient 159 0 - 450 pg/mL   CBC with platelets and differential     Status: Abnormal   Result Value Ref Range    WBC Count 13.3 (H) 4.0 - 11.0 10e3/uL    RBC Count 2.24 (L) 3.80 - 5.20 10e6/uL    Hemoglobin 6.8 (LL) 11.7 - 15.7 g/dL    Hematocrit 18.6 (L) 35.0 - 47.0 %    MCV 83 78 - 100 fL    MCH 30.4 26.5 - 33.0 pg    MCHC 36.6 (H) 31.5 - 36.5 g/dL    RDW 26.5 (H) 10.0 - 15.0 %    Platelet Count 421 150 - 450 10e3/uL    % Neutrophils 65 %    % Lymphocytes 22 %    % Monocytes 8 %    % Eosinophils 3 %    % Basophils 3 %    % Immature Granulocytes 1 %    NRBCs per 100 WBC 3 (H) <1 /100    Absolute Neutrophils 8.6 (H) 1.6 - 8.3 10e3/uL    Absolute Lymphocytes 2.9 0.8 - 5.3 10e3/uL    Absolute Monocytes 1.0 0.0 - 1.3 10e3/uL    Absolute Eosinophils 0.4 0.0 - 0.7 10e3/uL    Absolute Basophils 0.3 (H) 0.0 - 0.2 10e3/uL    Absolute Immature Granulocytes 0.1 <=0.4 10e3/uL    Absolute NRBCs 0.4 10e3/uL   EKG 12 lead     Status: None   Result Value Ref Range    Systolic Blood Pressure  mmHg    Diastolic Blood Pressure  mmHg    Ventricular Rate 68 BPM    Atrial Rate 68 BPM    RI Interval 168 ms    QRS Duration 74 ms     ms    QTc 459 ms    P Axis 73 degrees    R AXIS 38 degrees    T Axis -4 degrees    Interpretation ECG       Sinus rhythm  Normal ECG    Unconfirmed report - interpretation of this ECG is computer generated - see medical record for  final interpretation  Confirmed by - EMERGENCY ROOM, PHYSICIAN ([id number]),  ZEFERINO CARVER ([id number]) on 2/8/2025 10:03:10 AM     CBC with platelets differential     Status: Abnormal    Narrative    The following orders were created for panel order CBC with platelets differential.  Procedure                               Abnormality         Status                     ---------                               -----------         ------                     CBC with platelets and d...[[id number]]  Abnormal            Final result                 Please view results for these tests on the individual orders.     Medications   HYDROmorphone (DILAUDID) injection 1 mg (1 mg Intravenous $Given 2/8/25 2917)   ketorolac (TORADOL) injection 30 mg (30 mg Intravenous $Given 2/8/25 9864)   lactated ringers BOLUS 1,000 mL (0 mLs Intravenous Stopped 2/8/25 0653)   ondansetron (ZOFRAN) injection 8 mg (8 mg Intravenous $Given 2/8/25 7062)     Labs Ordered and Resulted from Time of ED Arrival to Time of ED Departure   RETICULOCYTE COUNT - Abnormal       Result Value    % Reticulocyte 21.3 (*)     Absolute Reticulocyte 0.479 (*)    INR - Abnormal    INR 1.16 (*)    D DIMER QUANTITATIVE - Abnormal    D-Dimer Quantitative 1.35 (*)    CBC WITH PLATELETS AND DIFFERENTIAL - Abnormal    WBC Count 13.3 (*)     RBC Count 2.24 (*)     Hemoglobin 6.8 (*)     Hematocrit 18.6 (*)     MCV 83      MCH 30.4      MCHC 36.6 (*)     RDW 26.5 (*)     Platelet Count 421      % Neutrophils 65      % Lymphocytes 22      % Monocytes 8      % Eosinophils 3      % Basophils 3      % Immature Granulocytes 1      NRBCs per 100 WBC 3 (*)     Absolute Neutrophils 8.6 (*)     Absolute Lymphocytes 2.9      Absolute Monocytes 1.0      Absolute Eosinophils 0.4      Absolute Basophils 0.3 (*)     Absolute Immature Granulocytes 0.1      Absolute NRBCs 0.4     BASIC METABOLIC PANEL - Normal    Sodium 140      Potassium 3.6      Chloride 105      Carbon Dioxide (CO2) 22       Anion Gap 13      Urea Nitrogen 7.5      Creatinine 0.55      GFR Estimate >90      Calcium 8.9      Glucose 78     TROPONIN T, HIGH SENSITIVITY - Normal    Troponin T, High Sensitivity <6     INFLUENZA A/B, RSV AND SARS-COV2 PCR - Normal    Influenza A PCR Negative      Influenza B PCR Negative      RSV PCR Negative      SARS CoV2 PCR Negative     NT PROBNP INPATIENT - Normal    N terminal Pro BNP Inpatient 159       Chest XR,  PA & LAT   Final Result   IMPRESSION: Left internal jugular venous Port-A-Cath terminates near the superior cavoatrial junction. Mild elevation of the right hemidiaphragm. Lungs appear clear. Normal heart size and pulmonary vascularity.      US Lower Extremity Venous Duplex Left   Final Result   IMPRESSION:   1.  No deep venous thrombosis in the left lower extremity.             Critical care was not performed.     Medical Decision Making  The patient's presentation was of high complexity (a chronic illness severe exacerbation, progression, or side effect of treatment).    The patient's evaluation involved:  review of external note(s) from 2 sources (ED, primary care and infusion center notes from the last week)  review of 2 test result(s) ordered prior to this encounter (most recent hgb/retic and recent CT chest PE)  ordering and/or review of 3+ test(s) in this encounter (see separate area of note for details)    The patient's management necessitated high risk (a parenteral controlled substance).    Assessment & Plan    25 yo F with a hx of sickle cell anemia presenting with pain, primarily in the left leg.   Vitals stable.   ED pain management care plan reviewed and followed for symptom control.   Has borderline O2 saturations, so work up for DVT/PE initiated. D dimer elevated. Right LE US negative for DVT. Has IV dye allergy so did not pursue CT imaging. VQ not available currently.   IV access obtained via port-a-cath.   Labs notable for hgb 6.8 today. Retic increased to 21%.   Placed  on O2, but was able to maintain stats ~92% on RA on reassessment.   Recommended for admission for VQ scan and hematology evaluation but pt declines. Is able to consent for her own care today.   Has home pulse oximeter and oxygen available, and recommended to use.    Discharged and recommended for close follow up with Primary Care and Hematology.      I have reviewed the nursing notes. I have reviewed the findings, diagnosis, plan and need for follow up with the patient.    Discharge Medication List as of 2/8/2025  7:04 AM          Final diagnoses:   Sickle cell pain crisis (H)       Cornelia Malloy MD   [facility] EMERGENCY DEPARTMENT  2/8/2025     Cornelia Malloy MD  02/09/25 1127

## 2025-02-08 NOTE — ED NOTES
Pt's oxygen 90% in triage. 95% on 2L NC in room. Pt reports her hgb was low this week when she saw her hematologist.

## 2025-02-08 NOTE — DISCHARGE INSTRUCTIONS
Thank you for coming to the [facility] Department.     Please use your home pulse oximeter and wear the home oxygen if less than 90%.     Follow up Monday with the Sickle Cell clinic.

## 2025-02-08 NOTE — ED NOTES
Pt given discharge paperwork and instructions. No questions or concerns at this time. A&O x4. Ambulatory with steady gait.    Per MD, plan to discharge pt via taxi on RA. Pt will call mom while in taxi. Pt has O2 and oxygen tank. Pt denies needing any supplies at this time. Pt will remain on O2 in room until taxi comes. Pt aware and agreeable to plan

## 2025-02-10 ENCOUNTER — INFUSION THERAPY VISIT (OUTPATIENT)
Dept: TRANSPLANT | Facility: CLINIC | Age: 25
End: 2025-02-10
Attending: PEDIATRICS
Payer: COMMERCIAL

## 2025-02-10 ENCOUNTER — NURSE TRIAGE (OUTPATIENT)
Dept: ONCOLOGY | Facility: CLINIC | Age: 25
End: 2025-02-10
Payer: COMMERCIAL

## 2025-02-10 ENCOUNTER — PATIENT OUTREACH (OUTPATIENT)
Dept: CARE COORDINATION | Facility: CLINIC | Age: 25
End: 2025-02-10
Payer: COMMERCIAL

## 2025-02-10 VITALS
TEMPERATURE: 98.3 F | DIASTOLIC BLOOD PRESSURE: 82 MMHG | SYSTOLIC BLOOD PRESSURE: 138 MMHG | OXYGEN SATURATION: 92 % | RESPIRATION RATE: 18 BRPM | HEART RATE: 86 BPM

## 2025-02-10 DIAGNOSIS — Z86.73 HISTORY OF STROKE: ICD-10-CM

## 2025-02-10 DIAGNOSIS — D57.00 SICKLE CELL PAIN CRISIS (H): Primary | ICD-10-CM

## 2025-02-10 DIAGNOSIS — D57.1 HB-SS DISEASE WITHOUT CRISIS (H): ICD-10-CM

## 2025-02-10 DIAGNOSIS — G81.10 SPASTIC HEMIPLEGIA, UNSPECIFIED ETIOLOGY, UNSPECIFIED LATERALITY (H): ICD-10-CM

## 2025-02-10 LAB
ABO + RH BLD: NORMAL
BLD GP AB SCN SERPL QL: NEGATIVE
SPECIMEN EXP DATE BLD: NORMAL

## 2025-02-10 PROCEDURE — 86923 COMPATIBILITY TEST ELECTRIC: CPT | Performed by: PEDIATRICS

## 2025-02-10 PROCEDURE — 96375 TX/PRO/DX INJ NEW DRUG ADDON: CPT

## 2025-02-10 PROCEDURE — 250N000011 HC RX IP 250 OP 636: Performed by: PEDIATRICS

## 2025-02-10 PROCEDURE — 96374 THER/PROPH/DIAG INJ IV PUSH: CPT

## 2025-02-10 PROCEDURE — 86900 BLOOD TYPING SEROLOGIC ABO: CPT | Performed by: PEDIATRICS

## 2025-02-10 PROCEDURE — 258N000003 HC RX IP 258 OP 636: Performed by: PEDIATRICS

## 2025-02-10 PROCEDURE — 96361 HYDRATE IV INFUSION ADD-ON: CPT

## 2025-02-10 PROCEDURE — 36591 DRAW BLOOD OFF VENOUS DEVICE: CPT | Performed by: PEDIATRICS

## 2025-02-10 PROCEDURE — 86850 RBC ANTIBODY SCREEN: CPT | Performed by: PEDIATRICS

## 2025-02-10 PROCEDURE — 250N000013 HC RX MED GY IP 250 OP 250 PS 637: Performed by: PEDIATRICS

## 2025-02-10 PROCEDURE — 96376 TX/PRO/DX INJ SAME DRUG ADON: CPT

## 2025-02-10 RX ORDER — KETOROLAC TROMETHAMINE 30 MG/ML
30 INJECTION, SOLUTION INTRAMUSCULAR; INTRAVENOUS ONCE
Status: CANCELLED
Start: 2025-04-01 | End: 2025-04-01

## 2025-02-10 RX ORDER — HEPARIN SODIUM (PORCINE) LOCK FLUSH IV SOLN 100 UNIT/ML 100 UNIT/ML
5 SOLUTION INTRAVENOUS
OUTPATIENT
Start: 2025-04-01

## 2025-02-10 RX ORDER — HEPARIN SODIUM,PORCINE 10 UNIT/ML
5 VIAL (ML) INTRAVENOUS
OUTPATIENT
Start: 2025-04-01

## 2025-02-10 RX ORDER — ONDANSETRON 2 MG/ML
8 INJECTION INTRAMUSCULAR; INTRAVENOUS EVERY 6 HOURS PRN
Status: CANCELLED
Start: 2025-04-01

## 2025-02-10 RX ORDER — HEPARIN SODIUM,PORCINE 10 UNIT/ML
5 VIAL (ML) INTRAVENOUS
OUTPATIENT
Start: 2025-02-10

## 2025-02-10 RX ORDER — HEPARIN SODIUM (PORCINE) LOCK FLUSH IV SOLN 100 UNIT/ML 100 UNIT/ML
5 SOLUTION INTRAVENOUS
Status: CANCELLED | OUTPATIENT
Start: 2025-02-10

## 2025-02-10 RX ORDER — ONDANSETRON 4 MG/1
8 TABLET, FILM COATED ORAL
Start: 2025-04-01

## 2025-02-10 RX ORDER — ONDANSETRON 4 MG/1
8 TABLET, ORALLY DISINTEGRATING ORAL
Status: COMPLETED | OUTPATIENT
Start: 2025-02-10 | End: 2025-02-10

## 2025-02-10 RX ORDER — DIPHENHYDRAMINE HCL 25 MG
50 CAPSULE ORAL
Status: COMPLETED | OUTPATIENT
Start: 2025-02-10 | End: 2025-02-10

## 2025-02-10 RX ORDER — KETOROLAC TROMETHAMINE 30 MG/ML
30 INJECTION, SOLUTION INTRAMUSCULAR; INTRAVENOUS ONCE
Status: COMPLETED | OUTPATIENT
Start: 2025-02-10 | End: 2025-02-10

## 2025-02-10 RX ORDER — DIPHENHYDRAMINE HCL 25 MG
50 CAPSULE ORAL
Status: CANCELLED
Start: 2025-04-01

## 2025-02-10 RX ADMIN — HYDROMORPHONE HYDROCHLORIDE 1 MG: 1 INJECTION, SOLUTION INTRAMUSCULAR; INTRAVENOUS; SUBCUTANEOUS at 13:22

## 2025-02-10 RX ADMIN — SODIUM CHLORIDE, POTASSIUM CHLORIDE, SODIUM LACTATE AND CALCIUM CHLORIDE 1000 ML: 600; 310; 30; 20 INJECTION, SOLUTION INTRAVENOUS at 12:19

## 2025-02-10 RX ADMIN — DIPHENHYDRAMINE HYDROCHLORIDE 50 MG: 25 CAPSULE ORAL at 12:20

## 2025-02-10 RX ADMIN — HYDROMORPHONE HYDROCHLORIDE 1 MG: 1 INJECTION, SOLUTION INTRAMUSCULAR; INTRAVENOUS; SUBCUTANEOUS at 12:22

## 2025-02-10 RX ADMIN — ONDANSETRON 8 MG: 8 TABLET, ORALLY DISINTEGRATING ORAL at 12:20

## 2025-02-10 RX ADMIN — KETOROLAC TROMETHAMINE 30 MG: 30 INJECTION, SOLUTION INTRAMUSCULAR; INTRAVENOUS at 12:21

## 2025-02-10 RX ADMIN — HYDROMORPHONE HYDROCHLORIDE 1 MG: 1 INJECTION, SOLUTION INTRAMUSCULAR; INTRAVENOUS; SUBCUTANEOUS at 14:29

## 2025-02-10 ASSESSMENT — PAIN SCALES - GENERAL: PAINLEVEL_OUTOF10: SEVERE PAIN (9)

## 2025-02-10 NOTE — PROGRESS NOTES
Infusion Nursing Note:  Jennifer Cervantes presents today for add on infusion.    Patient seen by provider today: No   present during visit today: Not Applicable.    Note:  Patient arrived for add on IVF/pain medication. Patient reports 9/10 pain to left leg and lower back. Last took home PO pain medication last night at 2200. States pain to left knee has been ongoing for weeks but today its the entire left leg. Denies chest pain, SOB, N/V, fever. ONC toxicity assessment completed. Patient also notes increased fatigue and is asking writer to reach out to [facility] for possible blood transfusion. Home medication and allergies reviewed. Received 1L LR per order. Received benadryl 50 mg PO, Zofran 8 mg ODT, Toradol 30 mg IVP, and dilaudid 1 mg IVP every 1 hour for a total of 3 doses. Patient reports pain improvement rated 5/10 following final dose of dilaudid.     IZA- Patricia Mantilla PA-C/Jennifer Lai RN @ [address] [place] [address] to schedule patient for 1U PRBC in next 2-3 days. Patient added to infusion schedule, ABO collected.    .      Intravenous Access:  Implanted Port.    Treatment Conditions:  Lab Results   Component Value Date    HGB 6.8 (LL) 02/08/2025    WBC 13.3 (H) 02/08/2025    ANEU 8.5 (H) 12/30/2024    ANEUTAUTO 8.6 (H) 02/08/2025     02/08/2025            Post Infusion Assessment:  Patient tolerated infusion without incident.  Blood return noted pre and post infusion.  Site patent and intact, free from redness, edema or discomfort.  No evidence of extravasations.  Access discontinued per protocol.       Discharge Plan:   Patient discharged in stable condition accompanied by: self.  Departure Mode: Ambulatory.      Jennifer Lai RN

## 2025-02-10 NOTE — PROGRESS NOTES
Ambulatory Care Management- Transportation Support  [facility]     Data/Intervention:  Patient Name:    Jennifer Cervantes     /Age: 2000 (24 year old)     CCRC team member assisting Specialty SW with transportation request.      Assessment:  CHW/CTA called [facility] to arrange medical appointment transportation in conjunction with patient's insurance. 11:00-11:30am  window    Taxi company: [employer]    Patient will need to call above taxi company at phone number: [phone] when ready for return ride home.      Plan:  Patient is aware of the transportation plan.  available to assist with any other needs.      Maritza Vick MA

## 2025-02-10 NOTE — TELEPHONE ENCOUNTER
Oncology Nurse Triage - Sickle Cell Pain Crisis:  Situation: Jennifer  calling about Sickle Cell Pain Crisis, requesting to be added on for IV fluids and pain medicine    Background:   Patient's last infusion was In ED on 2/8/25  Last clinic visit date:1/31/25 w/Vida Falcon  Does patient have active treatment plan? Yes    Assessment of Symptoms:  Onset/Duration of symptoms: 1 day    Is it typical sickle cell pain? Yes  Location: all over  Character: Sharp  Intensity: 10/10    Any radiation of pain, numbness, tingling, weakness, warmth, swelling, discoloration of arms or legs?No    Fever? No  Chest Pain Present: No  Shortness of breath: No    Other home therapies tried: HEAT/HEATING PAD and WARM BATH     Last home medication taken and when: Yesterday at 10 PM took oxycodone and ibuprofen    Any Refills Needed?: No    Does patient have transportation & length of time to get to clinic: If it is early she can get herself there. If not, she needs transportation.    Recommendations:   If you do not hear from the infusion center by 2pm then you will not be able to get in for an infusion today. If symptoms worsen while waiting for call back, and/or you experience fever, chills, SOB, chest pain, cough, n/v, dizziness, numbness, swelling, discoloration of extremities, then seek emergency evaluation in Emergency Department.     Pt voiced understanding.  Pt added to Infusion Wait list.    Needs provider approval:     0717: Secure chat sent to Patricia Mantilla CNP to ask if okay for pt to be added to infusion wait list.   0720: Per Darien Gomez for pt to be added to infusion wait list.    0741: [facility] can offer apt at 1200.   0742: Called Jennifer who confirmed she can come to apt today at 1200; requesting SW arrange transportation.  Updated Infusion Charge Nurse.  0745: Message sent to SW to ask them to assist with transportation to and from clinic apt at 1200.  0746: Message sent to [facility] to schedule pt in [facility] at 1200.    Message routed  to Care Team.

## 2025-02-11 RX ORDER — HEPARIN SODIUM (PORCINE) LOCK FLUSH IV SOLN 100 UNIT/ML 100 UNIT/ML
5 SOLUTION INTRAVENOUS
OUTPATIENT
Start: 2025-02-11

## 2025-02-11 RX ORDER — HEPARIN SODIUM,PORCINE 10 UNIT/ML
5 VIAL (ML) INTRAVENOUS
OUTPATIENT
Start: 2025-02-11

## 2025-02-12 ENCOUNTER — NURSE TRIAGE (OUTPATIENT)
Dept: ONCOLOGY | Facility: CLINIC | Age: 25
End: 2025-02-12

## 2025-02-12 ENCOUNTER — THERAPY VISIT (OUTPATIENT)
Dept: PHYSICAL THERAPY | Facility: CLINIC | Age: 25
End: 2025-02-12
Attending: PEDIATRICS
Payer: COMMERCIAL

## 2025-02-12 ENCOUNTER — INFUSION THERAPY VISIT (OUTPATIENT)
Dept: TRANSPLANT | Facility: CLINIC | Age: 25
End: 2025-02-12
Attending: PEDIATRICS
Payer: COMMERCIAL

## 2025-02-12 VITALS
OXYGEN SATURATION: 90 % | HEART RATE: 75 BPM | RESPIRATION RATE: 14 BRPM | SYSTOLIC BLOOD PRESSURE: 128 MMHG | DIASTOLIC BLOOD PRESSURE: 79 MMHG | TEMPERATURE: 97.6 F

## 2025-02-12 DIAGNOSIS — G81.10 SPASTIC HEMIPLEGIA, UNSPECIFIED ETIOLOGY, UNSPECIFIED LATERALITY (H): ICD-10-CM

## 2025-02-12 DIAGNOSIS — Z86.73 HISTORY OF STROKE: ICD-10-CM

## 2025-02-12 DIAGNOSIS — M25.562 ACUTE PAIN OF LEFT KNEE: Primary | ICD-10-CM

## 2025-02-12 DIAGNOSIS — S83.8X2A MENISCAL INJURY, LEFT, INITIAL ENCOUNTER: ICD-10-CM

## 2025-02-12 DIAGNOSIS — D57.1 HB-SS DISEASE WITHOUT CRISIS (H): Primary | ICD-10-CM

## 2025-02-12 DIAGNOSIS — D57.00 SICKLE CELL PAIN CRISIS (H): ICD-10-CM

## 2025-02-12 DIAGNOSIS — D57.00 SICKLE CELL DISEASE WITH CRISIS (H): ICD-10-CM

## 2025-02-12 PROCEDURE — 96375 TX/PRO/DX INJ NEW DRUG ADDON: CPT

## 2025-02-12 PROCEDURE — 250N000013 HC RX MED GY IP 250 OP 250 PS 637: Performed by: PEDIATRICS

## 2025-02-12 PROCEDURE — P9016 RBC LEUKOCYTES REDUCED: HCPCS | Performed by: PEDIATRICS

## 2025-02-12 PROCEDURE — 96361 HYDRATE IV INFUSION ADD-ON: CPT

## 2025-02-12 PROCEDURE — 96376 TX/PRO/DX INJ SAME DRUG ADON: CPT

## 2025-02-12 PROCEDURE — 97161 PT EVAL LOW COMPLEX 20 MIN: CPT | Mod: GP

## 2025-02-12 PROCEDURE — 250N000011 HC RX IP 250 OP 636: Mod: JZ | Performed by: PEDIATRICS

## 2025-02-12 PROCEDURE — 258N000003 HC RX IP 258 OP 636: Performed by: PEDIATRICS

## 2025-02-12 PROCEDURE — 96374 THER/PROPH/DIAG INJ IV PUSH: CPT

## 2025-02-12 PROCEDURE — 97110 THERAPEUTIC EXERCISES: CPT | Mod: GP

## 2025-02-12 PROCEDURE — 36430 TRANSFUSION BLD/BLD COMPNT: CPT

## 2025-02-12 RX ORDER — HEPARIN SODIUM (PORCINE) LOCK FLUSH IV SOLN 100 UNIT/ML 100 UNIT/ML
5 SOLUTION INTRAVENOUS
OUTPATIENT
Start: 2025-04-01

## 2025-02-12 RX ORDER — KETOROLAC TROMETHAMINE 30 MG/ML
30 INJECTION, SOLUTION INTRAMUSCULAR; INTRAVENOUS ONCE
Start: 2025-04-01 | End: 2025-04-01

## 2025-02-12 RX ORDER — DIPHENHYDRAMINE HCL 25 MG
50 CAPSULE ORAL
Status: COMPLETED | OUTPATIENT
Start: 2025-02-12 | End: 2025-02-12

## 2025-02-12 RX ORDER — ONDANSETRON 2 MG/ML
8 INJECTION INTRAMUSCULAR; INTRAVENOUS EVERY 6 HOURS PRN
Status: DISCONTINUED | OUTPATIENT
Start: 2025-02-12 | End: 2025-02-12 | Stop reason: HOSPADM

## 2025-02-12 RX ORDER — HEPARIN SODIUM (PORCINE) LOCK FLUSH IV SOLN 100 UNIT/ML 100 UNIT/ML
5 SOLUTION INTRAVENOUS
Status: DISCONTINUED | OUTPATIENT
Start: 2025-02-12 | End: 2025-02-12 | Stop reason: HOSPADM

## 2025-02-12 RX ORDER — DIPHENHYDRAMINE HCL 25 MG
50 CAPSULE ORAL
Start: 2025-04-01

## 2025-02-12 RX ORDER — ONDANSETRON 2 MG/ML
8 INJECTION INTRAMUSCULAR; INTRAVENOUS EVERY 6 HOURS PRN
Start: 2025-04-01

## 2025-02-12 RX ORDER — HEPARIN SODIUM,PORCINE 10 UNIT/ML
5 VIAL (ML) INTRAVENOUS
OUTPATIENT
Start: 2025-04-01

## 2025-02-12 RX ORDER — ONDANSETRON 4 MG/1
8 TABLET, FILM COATED ORAL
Start: 2025-04-01

## 2025-02-12 RX ORDER — KETOROLAC TROMETHAMINE 30 MG/ML
30 INJECTION, SOLUTION INTRAMUSCULAR; INTRAVENOUS ONCE
Status: COMPLETED | OUTPATIENT
Start: 2025-02-12 | End: 2025-02-12

## 2025-02-12 RX ORDER — OXYCODONE HYDROCHLORIDE 10 MG/1
10 TABLET ORAL EVERY 6 HOURS PRN
Qty: 12 TABLET | Refills: 0 | Status: SHIPPED | OUTPATIENT
Start: 2025-02-12

## 2025-02-12 RX ADMIN — HYDROMORPHONE HYDROCHLORIDE 1 MG: 1 INJECTION, SOLUTION INTRAMUSCULAR; INTRAVENOUS; SUBCUTANEOUS at 10:44

## 2025-02-12 RX ADMIN — HYDROMORPHONE HYDROCHLORIDE 1 MG: 1 INJECTION, SOLUTION INTRAMUSCULAR; INTRAVENOUS; SUBCUTANEOUS at 13:19

## 2025-02-12 RX ADMIN — SODIUM CHLORIDE, POTASSIUM CHLORIDE, SODIUM LACTATE AND CALCIUM CHLORIDE 1000 ML: 600; 310; 30; 20 INJECTION, SOLUTION INTRAVENOUS at 10:19

## 2025-02-12 RX ADMIN — DIPHENHYDRAMINE HYDROCHLORIDE 50 MG: 25 CAPSULE ORAL at 10:37

## 2025-02-12 RX ADMIN — HYDROMORPHONE HYDROCHLORIDE 1 MG: 1 INJECTION, SOLUTION INTRAMUSCULAR; INTRAVENOUS; SUBCUTANEOUS at 11:55

## 2025-02-12 RX ADMIN — HEPARIN 5 ML: 100 SYRINGE at 15:24

## 2025-02-12 RX ADMIN — ONDANSETRON 8 MG: 2 INJECTION INTRAMUSCULAR; INTRAVENOUS at 10:38

## 2025-02-12 RX ADMIN — KETOROLAC TROMETHAMINE 30 MG: 30 INJECTION, SOLUTION INTRAMUSCULAR; INTRAVENOUS at 11:24

## 2025-02-12 ASSESSMENT — PAIN SCALES - GENERAL: PAINLEVEL_OUTOF10: SEVERE PAIN (8)

## 2025-02-12 NOTE — TELEPHONE ENCOUNTER
"Oncology Nurse Triage - Sickle Cell Pain Crisis:  Situation: Jennifer  calling about Sickle Cell Pain Crisis, requesting to be added on for IV fluids and pain medicine    Background:   Patient's last infusion was 2/10/25  Last clinic visit date:1/31/25 with Vida Falcon CNP  Does patient have active treatment plan? Yes    Assessment of Symptoms:  Onset/Duration of symptoms: 1 day    Is it typical sickle cell pain? Yes  Location: back  Character: Sharp and shooting  Intensity: 8/10    Any radiation of pain, numbness, tingling, weakness, warmth, swelling, discoloration of arms or legs?No    Fever? No  Chest Pain Present: No  Shortness of breath: No    Other home therapies tried: HEAT/HEATING PAD and WARM BATH   Last home medication taken and when: Yesterday at 2300, ibuprofen and oxycodone    Any Refills Needed?: Yes, needs for tomorrow. Oxycodone Taking BID. Yes, adequately controls pain; no SE, [address], not out of pain meds. Pended in separate refill encounter.    Does patient have transportation & length of time to get to clinic: Yes    Recommendations:   If you do not hear from the infusion center by 2pm then you will not be able to get in for an infusion today. If symptoms worsen while waiting for call back, and/or you experience fever, chills, SOB, chest pain, cough, n/v, dizziness, numbness, swelling, discoloration of extremities, then seek emergency evaluation in Emergency Department.     0800: Secure chat sent to Patricia JIMENEZ asking if okay for pt to have IVF/pm with her transfusion today.    0807: Approved by Patricia, and this would be her final infusion for the week.    0810: Called [facility] infusion and spoke with Staci. They are able to add on IVF/pm to pt's transfusion.    0812: Spoke with Jennifer and updated her that [facility] can add on IVF/pm and Patricia approved but this would be final IVF/pm infusion for the week. Pt states, \"I know that.\"    Message Routed to Care Team.        "

## 2025-02-12 NOTE — PROGRESS NOTES
PHYSICAL THERAPY EVALUATION  Type of Visit: Evaluation       Fall Risk Screen:  Fall screen completed by: PT  Have you fallen 2 or more times in the past year?: No  Have you fallen and had an injury in the past year?: No  Is patient a fall risk?: No    Pt reports she was picking up items from the ground and felt pain in her knee and felt it buckle. She is having pain in the anterior knee, especially in the anterior lateral aspect at the subpatellar fat pad.     Employment:    No   Hobbies/Interests:  Activity level:   BUD:       Patient goals for therapy:  walk without pain     Pain assessment:  Location: anterior lateral knee in subpatellar fat pad   Quality: sharp, pinching, unstable   Worse with: walking, moving   Better with: rest   Pain at rest (0-10): 0   Pain at most painful (0-10): 5     History of falls: hx of stroke but no falls, pt wears AFO on right side     Relevant PMH:  sickle cell disease, history of stroke and hemiplegia on R side     Subjective         Presenting condition or subjective complaint:    Date of onset: 02/12/25    Relevant medical history:     Dates & types of surgery:    Prior diagnostic imaging/testing results:       Prior therapy history for the same diagnosis, illness or injury:        Living Environment  Social support:     Type of home:     Stairs to enter the home:         Ramp:     Stairs inside the home:         Help at home:    Equipment owned:       Employment:      Hobbies/Interests:      Patient goals for therapy:      Pain assessment: see above      Objective     INTEGUMENTARY (edema, incisions): WNL    PALPATION: not TTP     POSTURE: WNL    GAIT:   Weightbearing Status: WBAT  Assistive Device(s): None  Gait Deviations: Antalgic    RANGE OF MOTION:     (Degrees) AROM PROM    Left Right Left  Right   Knee Flexion WNL WNL     Knee Extension WNL WNL         SPECIAL TESTS:    Left Right   Apley's (Meniscus)     Teofilo's (Meniscus) Positive Negative    Venkatesh's (ITB/TFL)      Patellar Apprehension Test     Patella Tracking Lateral patellar tilt Lateral patellar tilt   Ligamentous Stability     Anterior Drawer (ACL) Negative,   Negative,     Posterior Drawer (PCL) Negative,   Negative,     Prone Dial Test at 30 Deg and 90 Deg (PCL/PLC)     Valgus Stress Testing at 0 Deg and 30 Deg Negative,   Negative,     Varus Stress Testing at 0 Deg and 30 Deg  Trace,   Negative,         Assessment & Plan   CLINICAL IMPRESSIONS  Medical Diagnosis: Acute pain of left knee  Meniscal injury, left, initial encounter    Treatment Diagnosis: L knee pain with strength and stability deficits   Impression/Assessment: Patient is a 24 year old female with complaints of L knee pain.  The following significant findings have been identified: Pain, Decreased strength, Impaired gait, and Instability. These impairments interfere with their ability to perform self care tasks, household chores, household mobility, and community mobility as compared to previous level of function.     Clinical Decision Making (Complexity):  Clinical Presentation: Stable/Uncomplicated  Clinical Presentation Rationale: based on medical and personal factors listed in PT evaluation  Clinical Decision Making (Complexity): Low complexity    PLAN OF CARE  Treatment Interventions:  Interventions: Gait Training, Manual Therapy, Neuromuscular Re-education, Therapeutic Activity, Therapeutic Exercise    Long Term Goals     PT Goal 1  Goal Identifier: Walk  Goal Description: Pt will walk 1 mile without provocation of knee pain in order to improve mobility in community.  Goal Progress: progressing  Target Date: 05/12/25  PT Goal 2  Goal Identifier: Sit to stand  Goal Description: Pt will complete 10 sit to stand transfers without strength or stability deficits in order to demonstrate functional improvement in lower extremity strength.  Goal Progress: progressing      Frequency of Treatment: 1x a week  Duration of Treatment: 90 days    Education  Assessment:   Learner/Method: Patient  Education Comments: Pt gabby eldridge    Risks and benefits of evaluation/treatment have been explained.   Patient/Family/caregiver agrees with Plan of Care.     Evaluation Time:     PT Eval, Low Complexity Minutes (61831): 15     Signing Clinician: Zulma Garcia, PT        [facility]                                                                                   OUTPATIENT PHYSICAL THERAPY      PLAN OF TREATMENT FOR OUTPATIENT REHABILITATION   Patient's Last Name, First Name, Jennifer Lobo YOB: 2000   Provider's Name   [facility]   Medical Record No.  [id number]     Onset Date: 02/12/25  Start of Care Date: 02/12/25     Medical Diagnosis:  Acute pain of left knee  Meniscal injury, left, initial encounter      PT Treatment Diagnosis:  L knee pain with strength and stability deficits Plan of Treatment  Frequency/Duration: 1x a week/ 90 days    Certification date from 02/12/25 to 05/12/25         See note for plan of treatment details and functional goals     Zulma Garcia, PT                         I CERTIFY THE NEED FOR THESE SERVICES FURNISHED UNDER        THIS PLAN OF TREATMENT AND WHILE UNDER MY CARE     (Physician attestation of this document indicates review and certification of the therapy plan).              Referring Provider:  Suraj Case    Initial Assessment  See Epic Evaluation- Start of Care Date: 02/12/25

## 2025-02-12 NOTE — PROGRESS NOTES
Infusion Nursing Note:  Jennifer Cervantes presents today for scheduled infusion.    Patient seen by provider today: No   present during visit today: Not Applicable.    Note: No labs today.  Patient rating pain 8/10 at the beginning of infusion, and 5/10 upon discharge.     Patient received 1mg IV Dilaudid x3 doses, 50 mg Benadryl PO, 8mg Zofran IVP and Toradol 30 mg. Patient received 1L LR 500ml/hr. Patient received 1 unit PRBC's per provider request for Hgb of 6.3.    VSS at the time of discharge.      Intravenous Access:  Implanted Port.    Treatment Conditions:  Lab Results   Component Value Date    HGB 6.8 (LL) 02/08/2025    WBC 13.3 (H) 02/08/2025    ANEU 8.5 (H) 12/30/2024    ANEUTAUTO 8.6 (H) 02/08/2025     02/08/2025        Lab Results   Component Value Date     02/08/2025    POTASSIUM 3.6 02/08/2025    MAG 2.0 05/16/2024    CR 0.55 02/08/2025    MICAH 8.9 02/08/2025    BILITOTAL 2.0 (H) 01/24/2025    ALBUMIN 4.2 01/24/2025    ALT 19 01/24/2025    AST 40 01/24/2025       Results reviewed, labs MET treatment parameters, ok to proceed with treatment.      Post Infusion Assessment:  Patient tolerated infusion without incident.  Blood return noted pre and post infusion.  Site patent and intact, free from redness, edema or discomfort.  No evidence of extravasations.  Access discontinued per protocol.       Discharge Plan:   Patient declined prescription refills.  Patient discharged in stable condition accompanied by: self.  Departure Mode: Ambulatory.      Shantell Wong RN

## 2025-02-12 NOTE — TELEPHONE ENCOUNTER
Narcotic Refill Request  Person Requesting Refill: Jennifer    Medication(s) requested:  oxycodone IR 10 mg tablet  Last fill date and by whom:  2/5/25 by Patricia Mantilla CNP  What pain is the medication treating: sickle cell pain  How is the medication being taken?:States she takes it BID  Does pt have enough for today? Yes. She just wanted to call for refill so she can  RX tomorrow.  Is pain being adequately controlled on the current regimen?: Yes  Experiencing any side effects from medication?: No    Date of most recent appointment:  1/31/25 with Vida Falcon CNP  Any No Show Visits:No  Next appointment:   2/27/25 with Patricia Mantilla CNP     Reviewed: No Access    Routed/Paged provider: Vida Falcon CNP

## 2025-02-13 ENCOUNTER — VIRTUAL VISIT (OUTPATIENT)
Dept: PHARMACY | Facility: CLINIC | Age: 25
End: 2025-02-13
Payer: COMMERCIAL

## 2025-02-13 DIAGNOSIS — T78.2XXS ANAPHYLAXIS, SEQUELA: ICD-10-CM

## 2025-02-13 DIAGNOSIS — L29.9 ITCHING: Primary | ICD-10-CM

## 2025-02-13 DIAGNOSIS — E83.111 IRON OVERLOAD DUE TO REPEATED RED BLOOD CELL TRANSFUSIONS: ICD-10-CM

## 2025-02-13 DIAGNOSIS — Z86.73 H/O: STROKE: ICD-10-CM

## 2025-02-13 DIAGNOSIS — J45.909 UNCOMPLICATED ASTHMA, UNSPECIFIED ASTHMA SEVERITY, UNSPECIFIED WHETHER PERSISTENT: ICD-10-CM

## 2025-02-13 DIAGNOSIS — D57.00 SICKLE CELL PAIN CRISIS (H): ICD-10-CM

## 2025-02-13 DIAGNOSIS — D57.1 HB-SS DISEASE WITHOUT CRISIS (H): ICD-10-CM

## 2025-02-13 PROCEDURE — 99605 MTMS BY PHARM NP 15 MIN: CPT | Mod: 93

## 2025-02-13 NOTE — PROGRESS NOTES
Medication Therapy Management (MTM) Encounter    ASSESSMENT:                            Medication Adherence/Access: No issues identified.    Allergy (Itching, Anaphylaxis)  Stable.     Asthma   Stable.     History of Stroke  Stable.     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     Chronic Pain (Sickle Cell Pain Crisis)  Stable.    PLAN:                            No changes today    Follow-up:   Tuesday May 13, 2025   Appt at 1:00 PM (30 min)  Telephone          SUBJECTIVE/OBJECTIVE:                          Jennifer Cervantes is a 24 year old female seen for a follow-up visit.       Reason for visit: 3 month follow up, annual medication review.    Allergies/ADRs: Reviewed in chart  Past Medical History: Reviewed in chart  Tobacco: She reports that she has never smoked. She has never been exposed to tobacco smoke. She has never used smokeless tobacco.  Alcohol: none    Medication Adherence/Access: no issues reported.    Allergy (Itching, Anaphylaxis)  Diphenhydramine 50 mg every 6 hours as needed - takes less than once a week  EpiPen 0.3 mg as needed - has on hand, hasn't need to use  Patient reports no current medication side effects.    Primary symptoms are itching.   Patient feels that current therapy is effective.      Asthma   Symbicort 160-4.5 mcg 2 puffs twice daily and 1-2 puffs as needed  Albuterol 0.083% 2 vials every 6 hours as needed -  usually needs more in the fall and winter, but hasn't been using this season  Patient rinses their mouth after using steroid inhaler.   Patient reports no current medication side effects.      Triggers include: strong odors and fumes and cold air.  Patient reports the following symptoms: breathing is significantly improved with Symbicort. Notes she hasn't needed to use it as a rescue inhaler, but is using it twice daily every day.     History of Stroke  Aspirin 81 mg twice daily  No concerns for bruising or bleeding.     Sickle  Cell Anemia, Iron Overload due to Repeated RBC Transfusions - Follows with Oncology  Hydroxyurea 3000 mg daily  Jadenu 1440 mg daily  Deferiprone 2000 mg twice daily  Patient reports no current medication side effects.           Chronic Pain (Sickle Cell Pain Crisis)  Oxycodone IR 10 mg every 4 hours as needed - takes 1-2 days per week  Narcan nasal spray as needed - has on hand, hasn't needed to use  Methocarbamol 750 mg 4 times daily as needed - takes once daily, no drowsiness  Ibuprofen 800 mg every 8 hours as needed - tries to limit, takes twice weekly with food  Gabapentin 300 mg 3 times daily - takes 600 mg at bedtime. Caused drowsiness during the day, even at lower doses.  Doesn't like taking a lot of medications to manage pain. Pain is pretty well managed with gabapentin and methocarbamol. If having breakthrough pain, will first take ibuprofen, followed by oxycodone if needed.    Today's Vitals: There were no vitals taken for this visit.  ----------------    I spent 30 minutes with this patient today.     A summary of these recommendations was sent via [employer].    [place] (Hailie), PharmD, MPH  Medication Therapy Management Pharmacist     Telemedicine Visit Details  The patient's medications can be safely assessed via a telemedicine encounter.  Type of service:  Telephone visit  Originating Location (pt. Location): Home    Distant Location (provider location):  On-site  Start Time:  3:00 PM  End Time:  3:30 PM     Medication Therapy Recommendations  No medication therapy recommendations to display

## 2025-02-14 ENCOUNTER — HOSPITAL ENCOUNTER (EMERGENCY)
Facility: CLINIC | Age: 25
Discharge: HOME | End: 2025-02-14
Attending: EMERGENCY MEDICINE | Admitting: EMERGENCY MEDICINE
Payer: COMMERCIAL

## 2025-02-14 VITALS
BODY MASS INDEX: 27.26 KG/M2 | HEIGHT: 64 IN | TEMPERATURE: 98.2 F | DIASTOLIC BLOOD PRESSURE: 92 MMHG | RESPIRATION RATE: 16 BRPM | WEIGHT: 159.7 LBS | HEART RATE: 88 BPM | OXYGEN SATURATION: 96 % | SYSTOLIC BLOOD PRESSURE: 119 MMHG

## 2025-02-14 DIAGNOSIS — D57.00 SICKLE CELL PAIN CRISIS (H): ICD-10-CM

## 2025-02-14 LAB
ALBUMIN SERPL BCG-MCNC: 4.7 G/DL (ref 3.5–5.2)
ALP SERPL-CCNC: 60 U/L (ref 40–150)
ALT SERPL W P-5'-P-CCNC: 21 U/L (ref 0–50)
ANION GAP SERPL CALCULATED.3IONS-SCNC: 13 MMOL/L (ref 7–15)
AST SERPL W P-5'-P-CCNC: 59 U/L (ref 0–45)
BASOPHILS # BLD AUTO: 0.2 10E3/UL (ref 0–0.2)
BASOPHILS NFR BLD AUTO: 1 %
BILIRUB SERPL-MCNC: 3.3 MG/DL
BUN SERPL-MCNC: 9.5 MG/DL (ref 6–20)
CALCIUM SERPL-MCNC: 9.1 MG/DL (ref 8.8–10.4)
CHLORIDE SERPL-SCNC: 106 MMOL/L (ref 98–107)
CREAT SERPL-MCNC: 0.55 MG/DL (ref 0.51–0.95)
EGFRCR SERPLBLD CKD-EPI 2021: >90 ML/MIN/1.73M2
EOSINOPHIL # BLD AUTO: 0.4 10E3/UL (ref 0–0.7)
EOSINOPHIL NFR BLD AUTO: 3 %
ERYTHROCYTE [DISTWIDTH] IN BLOOD BY AUTOMATED COUNT: 22.6 % (ref 10–15)
GLUCOSE SERPL-MCNC: 86 MG/DL (ref 70–99)
HCO3 SERPL-SCNC: 21 MMOL/L (ref 22–29)
HCT VFR BLD AUTO: 22.2 % (ref 35–47)
HGB BLD-MCNC: 8.3 G/DL (ref 11.7–15.7)
IMM GRANULOCYTES # BLD: 0.1 10E3/UL
IMM GRANULOCYTES NFR BLD: 0 %
LYMPHOCYTES # BLD AUTO: 1.9 10E3/UL (ref 0.8–5.3)
LYMPHOCYTES NFR BLD AUTO: 17 %
MCH RBC QN AUTO: 31.6 PG (ref 26.5–33)
MCHC RBC AUTO-ENTMCNC: 37.4 G/DL (ref 31.5–36.5)
MCV RBC AUTO: 84 FL (ref 78–100)
MONOCYTES # BLD AUTO: 0.9 10E3/UL (ref 0–1.3)
MONOCYTES NFR BLD AUTO: 8 %
NEUTROPHILS # BLD AUTO: 8.1 10E3/UL (ref 1.6–8.3)
NEUTROPHILS NFR BLD AUTO: 71 %
NRBC # BLD AUTO: 0.2 10E3/UL
NRBC BLD AUTO-RTO: 2 /100
PLATELET # BLD AUTO: 375 10E3/UL (ref 150–450)
POTASSIUM SERPL-SCNC: 4 MMOL/L (ref 3.4–5.3)
PROT SERPL-MCNC: 8.1 G/DL (ref 6.4–8.3)
RBC # BLD AUTO: 2.63 10E6/UL (ref 3.8–5.2)
RETICS # AUTO: 0.5 10E6/UL (ref 0.03–0.1)
RETICS/RBC NFR AUTO: 19.4 % (ref 0.5–2)
SODIUM SERPL-SCNC: 140 MMOL/L (ref 135–145)
WBC # BLD AUTO: 11.5 10E3/UL (ref 4–11)

## 2025-02-14 PROCEDURE — 96375 TX/PRO/DX INJ NEW DRUG ADDON: CPT | Performed by: EMERGENCY MEDICINE

## 2025-02-14 PROCEDURE — 96374 THER/PROPH/DIAG INJ IV PUSH: CPT | Performed by: EMERGENCY MEDICINE

## 2025-02-14 PROCEDURE — 36415 COLL VENOUS BLD VENIPUNCTURE: CPT | Performed by: EMERGENCY MEDICINE

## 2025-02-14 PROCEDURE — 96361 HYDRATE IV INFUSION ADD-ON: CPT | Performed by: EMERGENCY MEDICINE

## 2025-02-14 PROCEDURE — 99284 EMERGENCY DEPT VISIT MOD MDM: CPT | Performed by: EMERGENCY MEDICINE

## 2025-02-14 PROCEDURE — 999N000202 HC STATISTICAL VASC ACCESS NURSE TIME, 1-15 MINUTES

## 2025-02-14 PROCEDURE — 84450 TRANSFERASE (AST) (SGOT): CPT | Performed by: EMERGENCY MEDICINE

## 2025-02-14 PROCEDURE — 85045 AUTOMATED RETICULOCYTE COUNT: CPT | Performed by: EMERGENCY MEDICINE

## 2025-02-14 PROCEDURE — 999N000007 HC SITE CHECK

## 2025-02-14 PROCEDURE — 250N000011 HC RX IP 250 OP 636: Performed by: EMERGENCY MEDICINE

## 2025-02-14 PROCEDURE — 99284 EMERGENCY DEPT VISIT MOD MDM: CPT | Mod: 25 | Performed by: EMERGENCY MEDICINE

## 2025-02-14 PROCEDURE — 85025 COMPLETE CBC W/AUTO DIFF WBC: CPT | Performed by: EMERGENCY MEDICINE

## 2025-02-14 PROCEDURE — 258N000003 HC RX IP 258 OP 636: Performed by: EMERGENCY MEDICINE

## 2025-02-14 PROCEDURE — 96376 TX/PRO/DX INJ SAME DRUG ADON: CPT | Performed by: EMERGENCY MEDICINE

## 2025-02-14 RX ORDER — SODIUM CHLORIDE, SODIUM LACTATE, POTASSIUM CHLORIDE, CALCIUM CHLORIDE 600; 310; 30; 20 MG/100ML; MG/100ML; MG/100ML; MG/100ML
INJECTION, SOLUTION INTRAVENOUS CONTINUOUS
Status: DISCONTINUED | OUTPATIENT
Start: 2025-02-14 | End: 2025-02-14 | Stop reason: HOSPADM

## 2025-02-14 RX ORDER — ONDANSETRON 2 MG/ML
4 INJECTION INTRAMUSCULAR; INTRAVENOUS ONCE
Status: COMPLETED | OUTPATIENT
Start: 2025-02-14 | End: 2025-02-14

## 2025-02-14 RX ADMIN — HYDROMORPHONE HYDROCHLORIDE 1 MG: 1 INJECTION, SOLUTION INTRAMUSCULAR; INTRAVENOUS; SUBCUTANEOUS at 04:43

## 2025-02-14 RX ADMIN — ONDANSETRON 4 MG: 2 INJECTION INTRAMUSCULAR; INTRAVENOUS at 04:43

## 2025-02-14 RX ADMIN — HYDROMORPHONE HYDROCHLORIDE 1 MG: 1 INJECTION, SOLUTION INTRAMUSCULAR; INTRAVENOUS; SUBCUTANEOUS at 06:45

## 2025-02-14 RX ADMIN — SODIUM CHLORIDE, POTASSIUM CHLORIDE, SODIUM LACTATE AND CALCIUM CHLORIDE: 600; 310; 30; 20 INJECTION, SOLUTION INTRAVENOUS at 04:42

## 2025-02-14 RX ADMIN — HYDROMORPHONE HYDROCHLORIDE 1 MG: 1 INJECTION, SOLUTION INTRAMUSCULAR; INTRAVENOUS; SUBCUTANEOUS at 05:40

## 2025-02-14 ASSESSMENT — ACTIVITIES OF DAILY LIVING (ADL)
ADLS_ACUITY_SCORE: 58

## 2025-02-14 NOTE — CONSULTS
"Consult received for Vascular access care.  ED unable to get port access. Discussed with patient that she last had access at her infusion center last Wednesday without issue. She is \"done with getting poked today\". She says she will go to the infusion center next week. If there are port issues she will let us know the next time she is in or they will be dealt with by the infusion staff. For additional needs place \"Nursing to Consult for Vascular Access\" [id number] order in EPIC.   "

## 2025-02-14 NOTE — ED PROVIDER NOTES
Emergency Department I-PASS Sign-out      Illness Severity: Stable    Patient Summary:  24 year old female with pertinent PMH of sickle cell disease who presented with low back and lower extremity pain.    ED Course/treatment plan: Patient has a care plan and history of frequent ED presentation under similar circumstances.  She is being treated per the recommendations of her care plan and her labs are at baseline.    Clinical Impression:    ICD-10-CM    1. Sickle cell pain crisis (H)  D57.00             Action List:  -To do:  Reassess after last dose from care plan for further needs    Situational Awareness & Contingency Planning:  Code Status (Most recent):  Prior    Disposition:  likely to be discharged      Synthesis & Events after sign-out:  Seen by vascular access.  Port was accessed recently, patient is unwilling to stay for further evaluation today will follow-up with infusion center next week.  [facility]        Ifeanyi Valdez MD   Emergency Medicine     Ifeanyi Valdez MD  02/14/25 [id number]

## 2025-02-14 NOTE — ED PROVIDER NOTES
ED Provider Note  [facility]      History     Chief Complaint   Patient presents with    Sickle Cell Pain Crisis     Patient c/o pain to lower back, left leg, left knee, and behind bilateral thigh. Pain started yesterday around noon.      HPI  Jennifer Cervantes is a 24 year old female who the past medical history of sickle cell disease who presents to the emergency department for evaluation of sickle cell pain crisis. Patient complains of pain that is consistent with her sickle cell pain crisis that started yesterday afternoon.  Patient complains of pain to her low back, left leg, left knee, and right leg/thigh.  Patient states that she did accidentally bumped her left knee on a metal bar but denies any other significant trauma.  Patient denies any recent illnesses, denies any fever, chills, cough or cold-like symptoms, denies any chest pain, shortness of breath, abdominal pain, nausea, vomiting, diarrhea, dysuria, hematuria.  Patient states she did get a blood transfusion on Wednesday as her hemoglobin levels were fluctuating and states that this may be contributing to some of her pain.  Patient states that she has tried everything including heating pad, hot shower, oxycodone, ibuprofen with no improvement of her symptoms.  Last medications were 2 hours prior to arrival.    Past Medical History  Past Medical History:   Diagnosis Date    Anxiety     Bleeding disorder     Blood clotting disorder     Cerebral infarction (H) 2015    Cognitive developmental delay     low IQ. 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 side (H)     right hand contractures    Iron overload due to repeated red blood cell transfusions     Migraines     Multiple subsegmental pulmonary emboli without acute cor pulmonale (H) 02/01/2021    Oppositional defiant behavior     Presence of intrauterine  contraceptive device 2/18/2020    Superficial venous thrombosis of arm, right 03/25/2021    Uncomplicated asthma      Past Surgical History:   Procedure Laterality Date    AS INSERT TUNNELED CV 2 CATH W/O PORT/PUMP      CHOLECYSTECTOMY      CV RIGHT HEART CATH MEASUREMENTS RECORDED N/A 11/18/2021    Procedure: Right Heart Cath;  Surgeon: Jackson Stauffer MD;  Location:  HEART CARDIAC CATH LAB    INSERT PORT VASCULAR ACCESS Left 4/21/2021    Procedure: INSERTION, VASCULAR ACCESS PORT (NOT SURE ON SIDE UNTIL REMOVAL);  Surgeon: Rajan More MD;  Location: [facility] OR    IR CHEST PORT PLACEMENT > 5 YRS OF AGE  4/21/2021    IR CVC NON TUNNEL LINE REMOVAL  5/6/2021    IR CVC NON TUNNEL PLACEMENT > 5 YRS  04/07/2020    IR CVC NON TUNNEL PLACEMENT > 5 YRS  4/30/2021    IR CVC NON TUNNEL PLACEMENT > 5 YRS  9/7/2022    IR PORT REMOVAL LEFT  4/21/2021    REMOVE PORT VASCULAR ACCESS Left 4/21/2021    Procedure: REMOVAL, VASCULAR ACCESS PORT LEFT;  Surgeon: Rajan More MD;  Location: [facility] OR    REPAIR TENDON ELBOW Right 10/02/2019    Procedure: Right Elbow Flexor Lengthening, Flexor Pronator Slide Of Wrist and Finger, Thumb Adductor Lengthening;  Surgeon: Anai Franco MD;  Location: [facility] OR    TONSILLECTOMY Bilateral 10/02/2019    Procedure: Bilateral Tonsillectomy;  Surgeon: Farhana Guy MD;  Location: [facility] OR    ZZC BREAST REDUCTION (INCLUDES LIPO) TIER 3 Bilateral 04/23/2019     albuterol (PROVENTIL) (2.5 MG/3ML) 0.083% neb solution  aspirin (ASA) 81 MG chewable tablet  budesonide-formoterol (SYMBICORT/BREYNA) 160-4.5 MCG/ACT Inhaler  deferasirox (JADENU) 360 MG tablet  Deferiprone, Twice Daily, 1000 MG TABS  diphenhydrAMINE (BENADRYL) 50 MG capsule  EPINEPHrine (ANY BX GENERIC EQUIV) 0.3 MG/0.3ML injection 2-pack  gabapentin (NEURONTIN) 300 MG capsule  hydroxyurea (HYDREA) 500 MG capsule  ibuprofen (ADVIL/MOTRIN) 800 MG tablet  methocarbamol (ROBAXIN) 750 MG tablet  methylPREDNISolone (MEDROL) 32  "MG tablet  naloxone (NARCAN) 4 MG/0.1ML nasal spray  oxyCODONE IR (ROXICODONE) 10 MG tablet      Allergies   Allergen Reactions    Contrast Dye Angioedema     Hives and breathing issues    Fish-Derived Products Hives    Seafood Hives    Adhesive Tape Hives     Primipore dressing causes hives    Gadolinium     Iodinated Contrast Media      Family History  Family History   Problem Relation Age of Onset    Sickle Cell Trait Mother     Hypertension Mother     Asthma Mother     Sickle Cell Trait Father     Glaucoma No family hx of     Macular Degeneration No family hx of     Diabetes No family hx of     Gout No family hx of      Social History   Social History     Tobacco Use    Smoking status: Never     Passive exposure: Never    Smokeless tobacco: Never   Substance Use Topics    Alcohol use: Not Currently     Alcohol/week: 0.0 standard drinks of alcohol    Drug use: Never      Past medical history, past surgical history, medications, allergies, family history, and social history were reviewed with the patient. No additional pertinent items.   A medically appropriate review of systems was performed with pertinent positives and negatives noted in the HPI, and all other systems negative.    Physical Exam   BP: 129/76  Pulse: 88  Temp: 98.1  F (36.7  C)  Resp: 20  Height: 162.6 cm (5' 4\")  Weight: 72.4 kg (159 lb 11.2 oz)  SpO2: 92 %  Physical Exam  General: Afebrile, in distress secondary to pain  HEENT: Normocephalic, atraumatic, conjunctivae normal. MMM  Neck: non-tender, supple  Cardio: regular rate. regular rhythm   Resp: Normal work of breathing, no respiratory distress, lungs clear bilaterally, no wheezing, rhonchi, rales  Chest/Back: no visual signs of trauma, no CVA tenderness   Abdomen: soft, non distension, no tenderness, no peritoneal signs   Neuro: alert and fully oriented. CN II-XII grossly intact. Grossly normal strength and sensation in all extremities.   MSK: no deformities. Normal range of " motion  Integumentary/Skin: no rash visualized, normal color  Psych: normal affect, normal behavior      ED Course, Procedures, & Data      Procedures    Results for orders placed or performed during the hospital encounter of 02/14/25   Comprehensive metabolic panel     Status: Abnormal   Result Value Ref Range    Sodium 140 135 - 145 mmol/L    Potassium 4.0 3.4 - 5.3 mmol/L    Carbon Dioxide (CO2) 21 (L) 22 - 29 mmol/L    Anion Gap 13 7 - 15 mmol/L    Urea Nitrogen 9.5 6.0 - 20.0 mg/dL    Creatinine 0.55 0.51 - 0.95 mg/dL    GFR Estimate >90 >60 mL/min/1.73m2    Calcium 9.1 8.8 - 10.4 mg/dL    Chloride 106 98 - 107 mmol/L    Glucose 86 70 - 99 mg/dL    Alkaline Phosphatase 60 40 - 150 U/L    AST 59 (H) 0 - 45 U/L    ALT 21 0 - 50 U/L    Protein Total 8.1 6.4 - 8.3 g/dL    Albumin 4.7 3.5 - 5.2 g/dL    Bilirubin Total 3.3 (H) <=1.2 mg/dL   Reticulocyte count     Status: Abnormal   Result Value Ref Range    % Reticulocyte 19.4 (H) 0.5 - 2.0 %    Absolute Reticulocyte 0.496 (H) 0.025 - 0.095 10e6/uL   CBC with platelets and differential     Status: Abnormal   Result Value Ref Range    WBC Count 11.5 (H) 4.0 - 11.0 10e3/uL    RBC Count 2.63 (L) 3.80 - 5.20 10e6/uL    Hemoglobin 8.3 (L) 11.7 - 15.7 g/dL    Hematocrit 22.2 (L) 35.0 - 47.0 %    MCV 84 78 - 100 fL    MCH 31.6 26.5 - 33.0 pg    MCHC 37.4 (H) 31.5 - 36.5 g/dL    RDW 22.6 (H) 10.0 - 15.0 %    Platelet Count 375 150 - 450 10e3/uL    % Neutrophils 71 %    % Lymphocytes 17 %    % Monocytes 8 %    % Eosinophils 3 %    % Basophils 1 %    % Immature Granulocytes 0 %    NRBCs per 100 WBC 2 (H) <1 /100    Absolute Neutrophils 8.1 1.6 - 8.3 10e3/uL    Absolute Lymphocytes 1.9 0.8 - 5.3 10e3/uL    Absolute Monocytes 0.9 0.0 - 1.3 10e3/uL    Absolute Eosinophils 0.4 0.0 - 0.7 10e3/uL    Absolute Basophils 0.2 0.0 - 0.2 10e3/uL    Absolute Immature Granulocytes 0.1 <=0.4 10e3/uL    Absolute NRBCs 0.2 10e3/uL   CBC with platelets differential     Status: Abnormal     Narrative    The following orders were created for panel order CBC with platelets differential.  Procedure                               Abnormality         Status                     ---------                               -----------         ------                     CBC with platelets and d...[[id number]]  Abnormal            Final result                 Please view results for these tests on the individual orders.     Medications   lactated ringers infusion ( Intravenous Restarted 2/14/25 7154)   HYDROmorphone (DILAUDID) injection 1 mg (1 mg Intravenous $Given 2/14/25 2116)   ondansetron (ZOFRAN) injection 4 mg (4 mg Intravenous $Given 2/14/25 6438)     Labs Ordered and Resulted from Time of ED Arrival to Time of ED Departure   COMPREHENSIVE METABOLIC PANEL - Abnormal       Result Value    Sodium 140      Potassium 4.0      Carbon Dioxide (CO2) 21 (*)     Anion Gap 13      Urea Nitrogen 9.5      Creatinine 0.55      GFR Estimate >90      Calcium 9.1      Chloride 106      Glucose 86      Alkaline Phosphatase 60      AST 59 (*)     ALT 21      Protein Total 8.1      Albumin 4.7      Bilirubin Total 3.3 (*)    RETICULOCYTE COUNT - Abnormal    % Reticulocyte 19.4 (*)     Absolute Reticulocyte 0.496 (*)    CBC WITH PLATELETS AND DIFFERENTIAL - Abnormal    WBC Count 11.5 (*)     RBC Count 2.63 (*)     Hemoglobin 8.3 (*)     Hematocrit 22.2 (*)     MCV 84      MCH 31.6      MCHC 37.4 (*)     RDW 22.6 (*)     Platelet Count 375      % Neutrophils 71      % Lymphocytes 17      % Monocytes 8      % Eosinophils 3      % Basophils 1      % Immature Granulocytes 0      NRBCs per 100 WBC 2 (*)     Absolute Neutrophils 8.1      Absolute Lymphocytes 1.9      Absolute Monocytes 0.9      Absolute Eosinophils 0.4      Absolute Basophils 0.2      Absolute Immature Granulocytes 0.1      Absolute NRBCs 0.2       No orders to display          Critical care was not performed.     Medical Decision Making  The patient's presentation was  of high complexity (a chronic illness severe exacerbation, progression, or side effect of treatment).    The patient's evaluation involved:  review of external note(s) from 3+ sources (prior ED notes,  care plan)  review of 3+ test result(s) ordered prior to this encounter (recent labs)  ordering and/or review of 3+ test(s) in this encounter (see separate area of note for details)  discussion of management or test interpretation with another health professional (morning provider)    The patient's management necessitated moderate risk (prescription drug management including medications given in the ED), high risk (a parenteral controlled substance), high risk (a decision regarding hospitalization), and further care after sign-out to morning provider (see their note for further management).    Assessment & Plan    Jennifer Cervantes is a 24 year old female who the past medical history of sickle cell disease who presents to the emergency department for evaluation of sickle cell pain crisis.  On arrival patient is nontoxic-appearing, afebrile, in distress secondary to pain.  Patient hemodynamically stable vital signs within normal limits.  Patient was treated per care plan with IV Dilaudid, IV fluids, Zofran.  Toradol was held as patient took ibuprofen prior to arrival.    Comprehensive labs remarkable with no acute metabolic or electrolyte abnormality, no significant transaminitis, bilirubin 3.3 (prior 2.0). White blood cell count 11.5 (previously 13.3), hemoglobin 8.3 (improved from 6.8).     On re-evaluation patient does report significant improvement of her symptoms after IV pain medications per care plan the patient would like to try to discharge home this morning. Patient did have some pain when accessing her port along with difficulty drawing blood.  Will have vascular access assessed the port this morning.  Suspect likely discharge home with close outpatient follow-up.  Strict return precautions discussed.  Patient  understands and agrees to the plan.    I have reviewed the nursing notes. I have reviewed the findings, diagnosis, plan and need for follow up with the patient.    New Prescriptions    No medications on file       Final diagnoses:   Sickle cell pain crisis (H)       Jamee Martin MD  [facility] EMERGENCY DEPARTMENT  2/14/2025     Jamee Martin MD  02/14/25 0649       Jamee Martin MD  02/14/25 0658

## 2025-02-14 NOTE — ED TRIAGE NOTES
Patient c/o pain to lower back, left leg, left knee, and behind bilateral thigh. Pain started yesterday around noon.      Triage Assessment (Adult)       Row Name 02/14/25 0300          Triage Assessment    Airway WDL WDL        Respiratory WDL    Respiratory WDL WDL        Skin Circulation/Temperature WDL    Skin Circulation/Temperature WDL WDL        Cardiac WDL    Cardiac WDL WDL        Peripheral/Neurovascular WDL    Peripheral Neurovascular WDL WDL        Cognitive/Neuro/Behavioral WDL    Cognitive/Neuro/Behavioral WDL WDL

## 2025-02-17 ENCOUNTER — NURSE TRIAGE (OUTPATIENT)
Dept: ONCOLOGY | Facility: CLINIC | Age: 25
End: 2025-02-17
Payer: COMMERCIAL

## 2025-02-17 ENCOUNTER — INFUSION THERAPY VISIT (OUTPATIENT)
Dept: TRANSPLANT | Facility: CLINIC | Age: 25
End: 2025-02-17
Payer: COMMERCIAL

## 2025-02-17 VITALS
RESPIRATION RATE: 16 BRPM | DIASTOLIC BLOOD PRESSURE: 82 MMHG | HEART RATE: 104 BPM | SYSTOLIC BLOOD PRESSURE: 148 MMHG | TEMPERATURE: 98.3 F | OXYGEN SATURATION: 92 %

## 2025-02-17 DIAGNOSIS — D57.00 SICKLE CELL PAIN CRISIS (H): Primary | ICD-10-CM

## 2025-02-17 DIAGNOSIS — G81.10 SPASTIC HEMIPLEGIA, UNSPECIFIED ETIOLOGY, UNSPECIFIED LATERALITY (H): ICD-10-CM

## 2025-02-17 PROCEDURE — 250N000011 HC RX IP 250 OP 636: Performed by: PEDIATRICS

## 2025-02-17 PROCEDURE — 258N000003 HC RX IP 258 OP 636: Performed by: PEDIATRICS

## 2025-02-17 PROCEDURE — 96361 HYDRATE IV INFUSION ADD-ON: CPT

## 2025-02-17 PROCEDURE — 250N000013 HC RX MED GY IP 250 OP 250 PS 637: Performed by: PEDIATRICS

## 2025-02-17 PROCEDURE — 96376 TX/PRO/DX INJ SAME DRUG ADON: CPT

## 2025-02-17 PROCEDURE — 96374 THER/PROPH/DIAG INJ IV PUSH: CPT

## 2025-02-17 PROCEDURE — 96375 TX/PRO/DX INJ NEW DRUG ADDON: CPT

## 2025-02-17 RX ORDER — ONDANSETRON 2 MG/ML
8 INJECTION INTRAMUSCULAR; INTRAVENOUS EVERY 6 HOURS PRN
Status: CANCELLED
Start: 2025-04-01

## 2025-02-17 RX ORDER — KETOROLAC TROMETHAMINE 30 MG/ML
30 INJECTION, SOLUTION INTRAMUSCULAR; INTRAVENOUS ONCE
Status: COMPLETED | OUTPATIENT
Start: 2025-02-17 | End: 2025-02-17

## 2025-02-17 RX ORDER — HEPARIN SODIUM (PORCINE) LOCK FLUSH IV SOLN 100 UNIT/ML 100 UNIT/ML
5 SOLUTION INTRAVENOUS
Status: CANCELLED | OUTPATIENT
Start: 2025-04-01

## 2025-02-17 RX ORDER — KETOROLAC TROMETHAMINE 30 MG/ML
30 INJECTION, SOLUTION INTRAMUSCULAR; INTRAVENOUS ONCE
Status: CANCELLED
Start: 2025-04-01 | End: 2025-04-01

## 2025-02-17 RX ORDER — HEPARIN SODIUM,PORCINE 10 UNIT/ML
5 VIAL (ML) INTRAVENOUS
Status: CANCELLED | OUTPATIENT
Start: 2025-04-01

## 2025-02-17 RX ORDER — HEPARIN SODIUM (PORCINE) LOCK FLUSH IV SOLN 100 UNIT/ML 100 UNIT/ML
5 SOLUTION INTRAVENOUS
Status: DISCONTINUED | OUTPATIENT
Start: 2025-02-17 | End: 2025-02-17 | Stop reason: HOSPADM

## 2025-02-17 RX ORDER — DIPHENHYDRAMINE HCL 25 MG
50 CAPSULE ORAL
Status: COMPLETED | OUTPATIENT
Start: 2025-02-17 | End: 2025-02-17

## 2025-02-17 RX ORDER — ONDANSETRON 4 MG/1
8 TABLET, FILM COATED ORAL
Status: CANCELLED
Start: 2025-04-01

## 2025-02-17 RX ORDER — DIPHENHYDRAMINE HCL 25 MG
50 CAPSULE ORAL
Status: CANCELLED
Start: 2025-04-01

## 2025-02-17 RX ORDER — ONDANSETRON 2 MG/ML
8 INJECTION INTRAMUSCULAR; INTRAVENOUS EVERY 6 HOURS PRN
Status: DISCONTINUED | OUTPATIENT
Start: 2025-02-17 | End: 2025-02-17 | Stop reason: HOSPADM

## 2025-02-17 RX ADMIN — SODIUM CHLORIDE, POTASSIUM CHLORIDE, SODIUM LACTATE AND CALCIUM CHLORIDE 1000 ML: 600; 310; 30; 20 INJECTION, SOLUTION INTRAVENOUS at 10:52

## 2025-02-17 RX ADMIN — KETOROLAC TROMETHAMINE 30 MG: 30 INJECTION, SOLUTION INTRAMUSCULAR; INTRAVENOUS at 10:51

## 2025-02-17 RX ADMIN — HYDROMORPHONE HYDROCHLORIDE 1 MG: 1 INJECTION, SOLUTION INTRAMUSCULAR; INTRAVENOUS; SUBCUTANEOUS at 10:51

## 2025-02-17 RX ADMIN — HYDROMORPHONE HYDROCHLORIDE 1 MG: 1 INJECTION, SOLUTION INTRAMUSCULAR; INTRAVENOUS; SUBCUTANEOUS at 11:51

## 2025-02-17 RX ADMIN — DIPHENHYDRAMINE HYDROCHLORIDE 50 MG: 25 CAPSULE ORAL at 10:50

## 2025-02-17 RX ADMIN — HEPARIN 5 ML: 100 SYRINGE at 14:13

## 2025-02-17 RX ADMIN — ONDANSETRON 8 MG: 2 INJECTION INTRAMUSCULAR; INTRAVENOUS at 10:51

## 2025-02-17 RX ADMIN — HYDROMORPHONE HYDROCHLORIDE 1 MG: 1 INJECTION, SOLUTION INTRAMUSCULAR; INTRAVENOUS; SUBCUTANEOUS at 12:50

## 2025-02-17 ASSESSMENT — PAIN SCALES - GENERAL: PAINLEVEL_OUTOF10: SEVERE PAIN (9)

## 2025-02-17 NOTE — PROGRESS NOTES
"Infusion Nursing Note:  Jennifer Cervantes presents today for IV fluids and pain medications.    Patient seen by provider today: No   present during visit today: Not Applicable.    Note:   Pt arrives with pain \"all over.\"     Pt received 1L LR at 500 mL/hr for 2 hours.    Pt received 50 mg po benadryl, 30 mg toradol IV, and zofran 8 mg IV.    Pt received dilaudid 1 mg IV every hour for a total of 3 doses (3 mg dilaudid total).      Intravenous Access:  Implanted Port.    Treatment Conditions:  Not Applicable.      Post Infusion Assessment:  Patient tolerated infusion without incident.  Blood return noted pre and post infusion.  Site patent and intact, free from redness, edema or discomfort.  No evidence of extravasations.  Access discontinued per protocol.       Discharge Plan:   Discharge instructions reviewed with: Patient.  Patient and/or family verbalized understanding of discharge instructions and all questions answered.  Patient discharged in stable condition accompanied by: self.  Departure Mode: Ambulatory.      Jamaica Napoles RN    "

## 2025-02-17 NOTE — TELEPHONE ENCOUNTER
Oncology Nurse Triage - Sickle Cell Pain Crisis:    Situation: Jennifer  calling about Sickle Cell Pain Crisis, requesting to be added on for IV fluids and pain medicine    Background:     Patient's last infusion was 02/14/25   Last clinic visit date:01/31/25 w/Vida Falcon  Does patient have active treatment plan? Yes    Assessment of Symptoms:  Onset/Duration of symptoms: 2 day    Is it typical sickle cell pain? Yes  Location: lower back and both arms  Character: Sharp  Intensity: 8/10    Any radiation of pain, numbness, tingling, weakness, warmth, swelling, discoloration of arms or legs?No    Fever? No    Chest Pain Present: No    Shortness of breath: No    Other home therapies tried: HEAT/HEATING PAD and WARM BATH     Last home medication taken and when: yesterday at 10 pm took oxycodone    Any Refills Needed?: No    Does patient have transportation & length of time to get to clinic: Yes, if something opens up early she has a ride, 20 min away. If appt is later in the day will need assistance.      Recommendations:   If you do not hear from the infusion center by 2pm then you will not be able to get in for an infusion today. If symptoms worsen while waiting for call back, and/or you experience fever, chills, SOB, chest pain, cough, n/v, dizziness, numbness, swelling, discoloration of extremities, then seek emergency evaluation in Emergency Department.     [id number] secure message to Patricia Mantilla    0750 Patricia approving IVF/Pain meds for today.    0754 Call to pt and informed her of available appt with BMT at 1030. Pt confirming she is able to make appt and will arrange her transportation herself.     Message sent to CCOD to have pt added to schedule.

## 2025-02-17 NOTE — NURSING NOTE
"Oncology Rooming Note    February 17, 2025 1:21 PM   Jennifer Cervantes is a 24 year old female who presents for:    Chief Complaint   Patient presents with    Infusion     Add on infusion appointment for IV fluids and pain medications. History of sickle cell disease.     Initial Vitals: BP (!) 148/82 (BP Location: Right arm, Patient Position: Sitting, Cuff Size: Adult Regular)   Pulse 104   Temp 98.3  F (36.8  C) (Oral)   Resp 16   SpO2 92%  Estimated body mass index is 27.41 kg/m  as calculated from the following:    Height as of 2/14/25: 1.626 m (5' 4\").    Weight as of 2/14/25: 72.4 kg (159 lb 11.2 oz). There is no height or weight on file to calculate BSA.  Severe Pain (9) Comment: Data Unavailable   No LMP recorded. Patient has had an implant.  Allergies reviewed: Yes  Medications reviewed: Yes    Medications: Medication refills not needed today.  Pharmacy name entered into [facility]:    [facility] - [place], [place] - [address] [place] [address] [id number]  [facility] - [place], [place] - [address] [place] [address]    Frailty Screening:   Is the patient here for a new oncology consult visit in cancer care? 2. No    PHQ9:  Did this patient require a PHQ9?: No      Clinical concerns: none       Jamaica Napoles RN              "

## 2025-02-19 ENCOUNTER — NURSE TRIAGE (OUTPATIENT)
Dept: ONCOLOGY | Facility: CLINIC | Age: 25
End: 2025-02-19
Payer: COMMERCIAL

## 2025-02-19 ENCOUNTER — INFUSION THERAPY VISIT (OUTPATIENT)
Dept: INFUSION THERAPY | Facility: CLINIC | Age: 25
End: 2025-02-19
Attending: PEDIATRICS
Payer: COMMERCIAL

## 2025-02-19 ENCOUNTER — PATIENT OUTREACH (OUTPATIENT)
Dept: CARE COORDINATION | Facility: CLINIC | Age: 25
End: 2025-02-19
Payer: COMMERCIAL

## 2025-02-19 VITALS
DIASTOLIC BLOOD PRESSURE: 74 MMHG | OXYGEN SATURATION: 91 % | RESPIRATION RATE: 16 BRPM | HEART RATE: 72 BPM | SYSTOLIC BLOOD PRESSURE: 120 MMHG

## 2025-02-19 DIAGNOSIS — D57.00 SICKLE CELL PAIN CRISIS (H): Primary | ICD-10-CM

## 2025-02-19 DIAGNOSIS — D57.00 SICKLE CELL DISEASE WITH CRISIS (H): ICD-10-CM

## 2025-02-19 DIAGNOSIS — G81.10 SPASTIC HEMIPLEGIA, UNSPECIFIED ETIOLOGY, UNSPECIFIED LATERALITY (H): ICD-10-CM

## 2025-02-19 PROCEDURE — 96375 TX/PRO/DX INJ NEW DRUG ADDON: CPT

## 2025-02-19 PROCEDURE — 96374 THER/PROPH/DIAG INJ IV PUSH: CPT

## 2025-02-19 PROCEDURE — 250N000011 HC RX IP 250 OP 636: Performed by: PEDIATRICS

## 2025-02-19 PROCEDURE — 250N000013 HC RX MED GY IP 250 OP 250 PS 637: Performed by: PEDIATRICS

## 2025-02-19 PROCEDURE — 96361 HYDRATE IV INFUSION ADD-ON: CPT

## 2025-02-19 PROCEDURE — 258N000003 HC RX IP 258 OP 636: Performed by: PEDIATRICS

## 2025-02-19 PROCEDURE — 96376 TX/PRO/DX INJ SAME DRUG ADON: CPT

## 2025-02-19 RX ORDER — KETOROLAC TROMETHAMINE 30 MG/ML
30 INJECTION, SOLUTION INTRAMUSCULAR; INTRAVENOUS ONCE
Status: COMPLETED | OUTPATIENT
Start: 2025-02-19 | End: 2025-02-19

## 2025-02-19 RX ORDER — KETOROLAC TROMETHAMINE 30 MG/ML
30 INJECTION, SOLUTION INTRAMUSCULAR; INTRAVENOUS ONCE
Start: 2025-04-01 | End: 2025-04-01

## 2025-02-19 RX ORDER — ONDANSETRON 2 MG/ML
8 INJECTION INTRAMUSCULAR; INTRAVENOUS EVERY 6 HOURS PRN
Start: 2025-04-01

## 2025-02-19 RX ORDER — DIPHENHYDRAMINE HCL 25 MG
50 CAPSULE ORAL
Start: 2025-04-01

## 2025-02-19 RX ORDER — HEPARIN SODIUM (PORCINE) LOCK FLUSH IV SOLN 100 UNIT/ML 100 UNIT/ML
5 SOLUTION INTRAVENOUS
OUTPATIENT
Start: 2025-04-01

## 2025-02-19 RX ORDER — ONDANSETRON 4 MG/1
8 TABLET, FILM COATED ORAL
Status: DISCONTINUED | OUTPATIENT
Start: 2025-02-19 | End: 2025-02-19

## 2025-02-19 RX ORDER — HEPARIN SODIUM,PORCINE 10 UNIT/ML
5 VIAL (ML) INTRAVENOUS
OUTPATIENT
Start: 2025-04-01

## 2025-02-19 RX ORDER — ONDANSETRON 4 MG/1
8 TABLET, FILM COATED ORAL
Start: 2025-04-01

## 2025-02-19 RX ORDER — HEPARIN SODIUM,PORCINE 10 UNIT/ML
5 VIAL (ML) INTRAVENOUS
Status: DISCONTINUED | OUTPATIENT
Start: 2025-02-19 | End: 2025-02-19 | Stop reason: HOSPADM

## 2025-02-19 RX ORDER — OXYCODONE HYDROCHLORIDE 10 MG/1
10 TABLET ORAL EVERY 6 HOURS PRN
Qty: 12 TABLET | Refills: 0 | Status: SHIPPED | OUTPATIENT
Start: 2025-02-19

## 2025-02-19 RX ORDER — DIPHENHYDRAMINE HCL 25 MG
50 CAPSULE ORAL
Status: COMPLETED | OUTPATIENT
Start: 2025-02-19 | End: 2025-02-19

## 2025-02-19 RX ORDER — HEPARIN SODIUM (PORCINE) LOCK FLUSH IV SOLN 100 UNIT/ML 100 UNIT/ML
5 SOLUTION INTRAVENOUS
Status: DISCONTINUED | OUTPATIENT
Start: 2025-02-19 | End: 2025-02-19 | Stop reason: HOSPADM

## 2025-02-19 RX ORDER — ONDANSETRON 2 MG/ML
8 INJECTION INTRAMUSCULAR; INTRAVENOUS EVERY 6 HOURS PRN
Status: DISCONTINUED | OUTPATIENT
Start: 2025-02-19 | End: 2025-02-19 | Stop reason: HOSPADM

## 2025-02-19 RX ADMIN — SODIUM CHLORIDE, POTASSIUM CHLORIDE, SODIUM LACTATE AND CALCIUM CHLORIDE 1000 ML: 600; 310; 30; 20 INJECTION, SOLUTION INTRAVENOUS at 11:17

## 2025-02-19 RX ADMIN — DIPHENHYDRAMINE HYDROCHLORIDE 50 MG: 25 CAPSULE ORAL at 11:21

## 2025-02-19 RX ADMIN — HEPARIN 5 ML: 100 SYRINGE at 13:52

## 2025-02-19 RX ADMIN — ONDANSETRON 8 MG: 2 INJECTION INTRAMUSCULAR; INTRAVENOUS at 11:38

## 2025-02-19 RX ADMIN — HYDROMORPHONE HYDROCHLORIDE 1 MG: 1 INJECTION, SOLUTION INTRAMUSCULAR; INTRAVENOUS; SUBCUTANEOUS at 13:26

## 2025-02-19 RX ADMIN — HYDROMORPHONE HYDROCHLORIDE 1 MG: 1 INJECTION, SOLUTION INTRAMUSCULAR; INTRAVENOUS; SUBCUTANEOUS at 12:27

## 2025-02-19 RX ADMIN — KETOROLAC TROMETHAMINE 30 MG: 30 INJECTION, SOLUTION INTRAMUSCULAR; INTRAVENOUS at 11:21

## 2025-02-19 RX ADMIN — HYDROMORPHONE HYDROCHLORIDE 1 MG: 1 INJECTION, SOLUTION INTRAMUSCULAR; INTRAVENOUS; SUBCUTANEOUS at 11:18

## 2025-02-19 NOTE — TELEPHONE ENCOUNTER
Oncology Nurse Triage - Sickle Cell Pain Crisis:  Situation: Jennifer  calling about Sickle Cell Pain Crisis, requesting to be added on for IV fluids and pain medicine    Background:   Patient's last infusion was 2/7/25  Last clinic visit date:1/31/25 with Vida Falcon CNP  Does patient have active treatment plan? Yes    Assessment of Symptoms:  Onset/Duration of symptoms: 1 day--started last night; medication is not working    Is it typical sickle cell pain? Yes  Location: bilateral legs  Character: Sharp  Intensity: 10/10    Any radiation of pain, numbness, tingling, weakness, warmth, swelling, discoloration of arms or legs?No    Fever? No  Chest Pain Present: No  Shortness of breath: No    Other home therapies tried: HEAT/HEATING PAD and WARM BATH   Last home medication taken and when: 0500 oxycodone    Any Refills Needed?: Yes--pended up in separate refill encounter.    Does patient have transportation & length of time to get to clinic: Yes; if early apt she can get herself there but if later she will need transportation.    Recommendations:   If you do not hear from the infusion center by 2pm then you will not be able to get in for an infusion today. If symptoms worsen while waiting for call back, and/or you experience fever, chills, SOB, chest pain, cough, n/v, dizziness, numbness, swelling, discoloration of extremities, then seek emergency evaluation in Emergency Department.     Pt voiced understanding.  Added to infusion wait list.    0726: [facility] can offer apt at 1100 for Jennifer; per charge nurse, please schedule in chair 49.  0727: Called Jennifer who confirmed she can be here at 1100. She will need transportation.   Updated Infusion Charge nurse.  Message sent to CCOD to schedule.  Message sent to SW to ask for assistance in coordinating ride to and from apt today.  Message routed to Care Team.

## 2025-02-19 NOTE — PROGRESS NOTES
Infusion Nursing Note:  Jennifer Cervantes presents today for IV fluids, pain medication, antiemetics.    Patient seen by provider today: No   present during visit today: Not Applicable.    Note:   1L LR over 2 hours.  IV Dilaudid x3.  IV Toradol.  IV Zofran.  PO Benadryl.    Intravenous Access:  Implanted Port.    Treatment Conditions:  Not Applicable.    Post Infusion Assessment:  Patient tolerated infusion without incident.  Blood return noted pre and post infusion.  Site patent and intact, free from redness, edema or discomfort.  No evidence of extravasations.  Access discontinued per protocol.     Discharge Plan:   Patient and/or family verbalized understanding of discharge instructions and all questions answered.  AVS to patient via MYCHART.    Patient discharged in stable condition accompanied by: self.  Departure Mode: Ambulatory.    Administrations This Visit       diphenhydrAMINE (BENADRYL) capsule 50 mg       Admin Date  02/19/2025 Action  $Given Dose  50 mg Route  Oral Documented By  Echo Perez RN              heparin lock flush 100 unit/mL injection 5 mL       Admin Date  02/19/2025 Action  $Given Dose  5 mL Route  Intracatheter Documented By  Echo Perez RN              HYDROmorphone (DILAUDID) injection 1 mg       Admin Date  02/19/2025 Action  $Given Dose  1 mg Route  Intravenous Documented By  Echo Perez RN               Admin Date  02/19/2025 Action  $Given Dose  1 mg Route  Intravenous Documented By  Echo Perez RN               Admin Date  02/19/2025 Action  $Given Dose  1 mg Route  Intravenous Documented By  Echo Perez RN              ketorolac (TORADOL) injection 30 mg       Admin Date  02/19/2025 Action  $Given Dose  30 mg Route  Intravenous Documented By  Echo Perez RN              lactated ringers BOLUS 1,000 mL       Admin Date  02/19/2025 Action  $New Bag Dose  1,000 mL Rate  500 mL/hr Route  Intravenous Documented By  Echo Perez  RN              ondansetron (ZOFRAN) injection 8 mg       Admin Date  02/19/2025 Action  $Given Dose  8 mg Route  Intravenous Documented By  Echo Perez RN                  BP (!) 148/82   Pulse 98   Resp 18   SpO2 95%     Echo Perez, RN

## 2025-02-19 NOTE — PROGRESS NOTES
Ambulatory Care Management- Transportation Support  [facility]     Data/Intervention:  Patient Name:    Jennifer Cervantes DOB/Age: 2000 (24 year old)     CCRC team member assisting Specialty SW with transportation request.      Assessment:  CHW/CTA called [facility] to arrange medical appointment transportation in conjunction with patient's insurance.     Taxi company: [employer]    Patient will need to call above taxi company at phone number: [phone] when ready for return ride home.      Plan:  Patient is aware of the transportation plan.  available to assist with any other needs.      Maritza Vick MA

## 2025-02-19 NOTE — TELEPHONE ENCOUNTER
Narcotic Refill Request  Person Requesting Refill:Jennifer    Medication(s) requested:  oxycodone 10 mg tablet  Last fill date and by whom:  2/12/25 by Patricia Mantilla CNp  What pain is the medication treating: sickle cell pain  How is the medication being taken?:Taking BID.   Does pt have enough for today? Yes--request is for refill tomorrow on her usual weekly fill date.  Is pain being adequately controlled on the current regimen?: Pt states that taking one tab BID doesn't cover the pain, but she is trying not to overuse and is spacing it out.   Experiencing any side effects from medication?: No    Date of most recent appointment:  1/31/25 with Vida Falcon CNP  Any No Show Visits:No  Next appointment:   2/27/25 with Patricia Mantilla CNP     Reviewed: No access    Routed/Paged provider: Vida Falcon CNP

## 2025-02-19 NOTE — PATIENT INSTRUCTIONS
Dear Jennifer Cervantes    Thank you for choosing [facility] ([facility]) for your infusion.  The following information is a summary of our appointment as well as important reminders.      We look forward in seeing you on your next appointment here at [facility]  Please don t hesitate to call us at [phone] to reschedule any of your appointments or to speak with one of the [facility] registered nurses.  It was a pleasure taking care of you today.    Sincerely,    [facility]  [address] [place] [address]  [address]  Phone:  [phone]

## 2025-02-23 ENCOUNTER — HOSPITAL ENCOUNTER (EMERGENCY)
Facility: CLINIC | Age: 25
Discharge: HOME | End: 2025-02-23
Attending: EMERGENCY MEDICINE | Admitting: EMERGENCY MEDICINE
Payer: COMMERCIAL

## 2025-02-23 VITALS
HEART RATE: 81 BPM | WEIGHT: 159 LBS | OXYGEN SATURATION: 93 % | RESPIRATION RATE: 18 BRPM | DIASTOLIC BLOOD PRESSURE: 81 MMHG | TEMPERATURE: 97.5 F | SYSTOLIC BLOOD PRESSURE: 131 MMHG | BODY MASS INDEX: 27.14 KG/M2 | HEIGHT: 64 IN

## 2025-02-23 DIAGNOSIS — S83.92XA SPRAIN OF LEFT KNEE, UNSPECIFIED LIGAMENT, INITIAL ENCOUNTER: ICD-10-CM

## 2025-02-23 DIAGNOSIS — D57.00 SICKLE CELL DISEASE WITH CRISIS (H): ICD-10-CM

## 2025-02-23 LAB
ANION GAP SERPL CALCULATED.3IONS-SCNC: 11 MMOL/L (ref 7–15)
BASOPHILS # BLD AUTO: 0.3 10E3/UL (ref 0–0.2)
BASOPHILS NFR BLD AUTO: 2 %
BUN SERPL-MCNC: 6.4 MG/DL (ref 6–20)
CALCIUM SERPL-MCNC: 8.6 MG/DL (ref 8.8–10.4)
CHLORIDE SERPL-SCNC: 104 MMOL/L (ref 98–107)
CREAT SERPL-MCNC: 0.5 MG/DL (ref 0.51–0.95)
EGFRCR SERPLBLD CKD-EPI 2021: >90 ML/MIN/1.73M2
EOSINOPHIL # BLD AUTO: 0.4 10E3/UL (ref 0–0.7)
EOSINOPHIL NFR BLD AUTO: 4 %
ERYTHROCYTE [DISTWIDTH] IN BLOOD BY AUTOMATED COUNT: 21.2 % (ref 10–15)
GLUCOSE SERPL-MCNC: 94 MG/DL (ref 70–99)
HCG SERPL QL: NEGATIVE
HCO3 SERPL-SCNC: 23 MMOL/L (ref 22–29)
HCT VFR BLD AUTO: 20.6 % (ref 35–47)
HGB BLD-MCNC: 7.3 G/DL (ref 11.7–15.7)
IMM GRANULOCYTES # BLD: 0.1 10E3/UL
IMM GRANULOCYTES NFR BLD: 0 %
LYMPHOCYTES # BLD AUTO: 2.9 10E3/UL (ref 0.8–5.3)
LYMPHOCYTES NFR BLD AUTO: 24 %
MCH RBC QN AUTO: 30.4 PG (ref 26.5–33)
MCHC RBC AUTO-ENTMCNC: 35.4 G/DL (ref 31.5–36.5)
MCV RBC AUTO: 86 FL (ref 78–100)
MONOCYTES # BLD AUTO: 0.9 10E3/UL (ref 0–1.3)
MONOCYTES NFR BLD AUTO: 7 %
NEUTROPHILS # BLD AUTO: 7.6 10E3/UL (ref 1.6–8.3)
NEUTROPHILS NFR BLD AUTO: 63 %
NRBC # BLD AUTO: 0.1 10E3/UL
NRBC BLD AUTO-RTO: 1 /100
PLATELET # BLD AUTO: 480 10E3/UL (ref 150–450)
POTASSIUM SERPL-SCNC: 4 MMOL/L (ref 3.4–5.3)
RBC # BLD AUTO: 2.4 10E6/UL (ref 3.8–5.2)
SODIUM SERPL-SCNC: 138 MMOL/L (ref 135–145)
WBC # BLD AUTO: 12.1 10E3/UL (ref 4–11)

## 2025-02-23 PROCEDURE — 99284 EMERGENCY DEPT VISIT MOD MDM: CPT | Performed by: EMERGENCY MEDICINE

## 2025-02-23 PROCEDURE — 250N000011 HC RX IP 250 OP 636: Performed by: EMERGENCY MEDICINE

## 2025-02-23 PROCEDURE — 96376 TX/PRO/DX INJ SAME DRUG ADON: CPT | Performed by: EMERGENCY MEDICINE

## 2025-02-23 PROCEDURE — 84703 CHORIONIC GONADOTROPIN ASSAY: CPT | Performed by: EMERGENCY MEDICINE

## 2025-02-23 PROCEDURE — 99284 EMERGENCY DEPT VISIT MOD MDM: CPT | Mod: 25 | Performed by: EMERGENCY MEDICINE

## 2025-02-23 PROCEDURE — 96375 TX/PRO/DX INJ NEW DRUG ADDON: CPT | Performed by: EMERGENCY MEDICINE

## 2025-02-23 PROCEDURE — 96374 THER/PROPH/DIAG INJ IV PUSH: CPT | Performed by: EMERGENCY MEDICINE

## 2025-02-23 PROCEDURE — 85004 AUTOMATED DIFF WBC COUNT: CPT | Performed by: EMERGENCY MEDICINE

## 2025-02-23 PROCEDURE — 96361 HYDRATE IV INFUSION ADD-ON: CPT | Performed by: EMERGENCY MEDICINE

## 2025-02-23 PROCEDURE — 36415 COLL VENOUS BLD VENIPUNCTURE: CPT | Performed by: EMERGENCY MEDICINE

## 2025-02-23 PROCEDURE — 85018 HEMOGLOBIN: CPT | Performed by: EMERGENCY MEDICINE

## 2025-02-23 PROCEDURE — 258N000003 HC RX IP 258 OP 636: Performed by: EMERGENCY MEDICINE

## 2025-02-23 PROCEDURE — 80048 BASIC METABOLIC PNL TOTAL CA: CPT | Performed by: EMERGENCY MEDICINE

## 2025-02-23 PROCEDURE — 85041 AUTOMATED RBC COUNT: CPT | Performed by: EMERGENCY MEDICINE

## 2025-02-23 RX ORDER — KETOROLAC TROMETHAMINE 30 MG/ML
30 INJECTION, SOLUTION INTRAMUSCULAR; INTRAVENOUS ONCE
Status: COMPLETED | OUTPATIENT
Start: 2025-02-23 | End: 2025-02-23

## 2025-02-23 RX ORDER — SODIUM CHLORIDE, SODIUM LACTATE, POTASSIUM CHLORIDE, CALCIUM CHLORIDE 600; 310; 30; 20 MG/100ML; MG/100ML; MG/100ML; MG/100ML
INJECTION, SOLUTION INTRAVENOUS CONTINUOUS
Status: DISCONTINUED | OUTPATIENT
Start: 2025-02-23 | End: 2025-02-23 | Stop reason: HOSPADM

## 2025-02-23 RX ORDER — HEPARIN SODIUM (PORCINE) LOCK FLUSH IV SOLN 100 UNIT/ML 100 UNIT/ML
100 SOLUTION INTRAVENOUS ONCE
Status: COMPLETED | OUTPATIENT
Start: 2025-02-23 | End: 2025-02-23

## 2025-02-23 RX ORDER — ONDANSETRON 2 MG/ML
8 INJECTION INTRAMUSCULAR; INTRAVENOUS
Status: COMPLETED | OUTPATIENT
Start: 2025-02-23 | End: 2025-02-23

## 2025-02-23 RX ADMIN — SODIUM CHLORIDE, POTASSIUM CHLORIDE, SODIUM LACTATE AND CALCIUM CHLORIDE: 600; 310; 30; 20 INJECTION, SOLUTION INTRAVENOUS at 05:16

## 2025-02-23 RX ADMIN — ONDANSETRON 8 MG: 2 INJECTION INTRAMUSCULAR; INTRAVENOUS at 05:14

## 2025-02-23 RX ADMIN — HYDROMORPHONE HYDROCHLORIDE 1 MG: 1 INJECTION, SOLUTION INTRAMUSCULAR; INTRAVENOUS; SUBCUTANEOUS at 07:38

## 2025-02-23 RX ADMIN — HYDROMORPHONE HYDROCHLORIDE 1 MG: 1 INJECTION, SOLUTION INTRAMUSCULAR; INTRAVENOUS; SUBCUTANEOUS at 05:18

## 2025-02-23 RX ADMIN — HYDROMORPHONE HYDROCHLORIDE 1 MG: 1 INJECTION, SOLUTION INTRAMUSCULAR; INTRAVENOUS; SUBCUTANEOUS at 06:31

## 2025-02-23 RX ADMIN — KETOROLAC TROMETHAMINE 30 MG: 30 INJECTION, SOLUTION INTRAMUSCULAR at 05:16

## 2025-02-23 RX ADMIN — HEPARIN SODIUM (PORCINE) LOCK FLUSH IV SOLN 100 UNIT/ML 100 UNITS: 100 SOLUTION at 08:43

## 2025-02-23 ASSESSMENT — ACTIVITIES OF DAILY LIVING (ADL)
ADLS_ACUITY_SCORE: 58

## 2025-02-23 NOTE — ED PROVIDER NOTES
Emergency Department I-PASS Sign-out      Illness Severity: Stable    Patient Summary:  24 year old female with pertinent PMH of sickle cell who presented with left leg pain. Worst in the left knee with concern for knee sprain.     ED Course/treatment plan: pain management plan, knee immobilizer    Clinical Impression:  (D57.00) Sickle cell disease with crisis (H)    (S83.92XA) Sprain of left knee, unspecified ligament, initial encounter  Plan: Ankle/Knee Bracing Supplies Order Knee         Immobilizer; Left      Edited by: Cornelia Malloy MD at 2/23/2025 0700    Action List:  -To do:  [ ] 3rd dose of pain medication at 7:30    Disposition:  To be discharged.  Has referral to sports medicine, knee immobilizer ordered. Has outpatient MRI on Thursday.     Edited by: Cornelia Malloy MD at 2/23/2025 0700    Synthesis & Events after sign-out:  Patient's pain is controlled at this point.  She is okay for discharge.  Discussed returning if symptoms worsening otherwise following up with her infusion clinic      Andrew Chou MD   Emergency Medicine     Andrew Chou MD  02/23/25 0802

## 2025-02-23 NOTE — ED PROVIDER NOTES
ED Provider Note  [facility]      History     Chief Complaint   Patient presents with    Sickle Cell Pain Crisis     Left leg pain. Took Oxycodone 10 mg 1 hour Prior to ED visit     HPI  Jennifer Cervantes is a 24 year old female who presents with knee pain.   Located in the left knee. Reports that she has been told she has a meniscus injury, but I do not see imaging that confirms this. Is scheduled for outpatient MRI on 2/27, and has started PT earlier this month.   Is using oxycodone PRN for sickle cell pain, and has also been using it when the knee is bothering her. No new swelling of the knee joint or lower leg/calf. Recent LE US negative for DVT. Has not been bracing. Does not think she can safely use crutches due to hx of stroke with right sided deficits.     Past Medical History  Past Medical History:   Diagnosis Date    Anxiety     Bleeding disorder     Blood clotting disorder     Cerebral infarction (H) 2015    Cognitive developmental delay     low IQ. 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 side (H)     right hand contractures    Iron overload due to repeated red blood cell transfusions     Migraines     Multiple subsegmental pulmonary emboli without acute cor pulmonale (H) 02/01/2021    Oppositional defiant behavior     Presence of intrauterine contraceptive device 2/18/2020    Superficial venous thrombosis of arm, right 03/25/2021    Uncomplicated asthma      Past Surgical History:   Procedure Laterality Date    AS INSERT TUNNELED CV 2 CATH W/O PORT/PUMP      CHOLECYSTECTOMY      CV RIGHT HEART CATH MEASUREMENTS RECORDED N/A 11/18/2021    Procedure: Right Heart Cath;  Surgeon: Jackson Stauffer MD;  Location:  HEART CARDIAC CATH LAB    INSERT PORT VASCULAR ACCESS Left 4/21/2021    Procedure: INSERTION, VASCULAR ACCESS PORT (NOT SURE ON SIDE UNTIL  REMOVAL);  Surgeon: Rajan More MD;  Location: [facility] OR    IR CHEST PORT PLACEMENT > 5 YRS OF AGE  4/21/2021    IR CVC NON TUNNEL LINE REMOVAL  5/6/2021    IR CVC NON TUNNEL PLACEMENT > 5 YRS  04/07/2020    IR CVC NON TUNNEL PLACEMENT > 5 YRS  4/30/2021    IR CVC NON TUNNEL PLACEMENT > 5 YRS  9/7/2022    IR PORT REMOVAL LEFT  4/21/2021    REMOVE PORT VASCULAR ACCESS Left 4/21/2021    Procedure: REMOVAL, VASCULAR ACCESS PORT LEFT;  Surgeon: Rajan More MD;  Location: [facility] OR    REPAIR TENDON ELBOW Right 10/02/2019    Procedure: Right Elbow Flexor Lengthening, Flexor Pronator Slide Of Wrist and Finger, Thumb Adductor Lengthening;  Surgeon: Anai Franco MD;  Location: [facility] OR    TONSILLECTOMY Bilateral 10/02/2019    Procedure: Bilateral Tonsillectomy;  Surgeon: Farhana Guy MD;  Location: [facility] OR    ZZC BREAST REDUCTION (INCLUDES LIPO) TIER 3 Bilateral 04/23/2019     albuterol (PROVENTIL) (2.5 MG/3ML) 0.083% neb solution  aspirin (ASA) 81 MG chewable tablet  budesonide-formoterol (SYMBICORT/BREYNA) 160-4.5 MCG/ACT Inhaler  deferasirox (JADENU) 360 MG tablet  Deferiprone, Twice Daily, 1000 MG TABS  diphenhydrAMINE (BENADRYL) 50 MG capsule  EPINEPHrine (ANY BX GENERIC EQUIV) 0.3 MG/0.3ML injection 2-pack  gabapentin (NEURONTIN) 300 MG capsule  hydroxyurea (HYDREA) 500 MG capsule  ibuprofen (ADVIL/MOTRIN) 800 MG tablet  methocarbamol (ROBAXIN) 750 MG tablet  methylPREDNISolone (MEDROL) 32 MG tablet  naloxone (NARCAN) 4 MG/0.1ML nasal spray  oxyCODONE IR (ROXICODONE) 10 MG tablet      Allergies   Allergen Reactions    Contrast Dye Angioedema     Hives and breathing issues    Fish-Derived Products Hives    Seafood Hives    Adhesive Tape Hives     Primipore dressing causes hives    Gadolinium     Iodinated Contrast Media      Family History  Family History   Problem Relation Age of Onset    Sickle Cell Trait Mother     Hypertension Mother     Asthma Mother     Sickle Cell Trait Father     Glaucoma No  "family hx of     Macular Degeneration No family hx of     Diabetes No family hx of     Gout No family hx of      Social History   Social History     Tobacco Use    Smoking status: Never     Passive exposure: Never    Smokeless tobacco: Never   Substance Use Topics    Alcohol use: Not Currently     Alcohol/week: 0.0 standard drinks of alcohol    Drug use: Never      A medically appropriate review of systems was performed with pertinent positives and negatives noted in the HPI, and all other systems negative.    Physical Exam   BP: 129/86  Pulse: 81  Temp: 98  F (36.7  C)  Resp: 18  Height: 162.6 cm (5' 4\")  Weight: 72.1 kg (159 lb)  SpO2: 95 %    Physical Exam  Gen:A&Ox3, no acute distress  CV:RRR without murmurs  PULM:Clear to auscultation bilaterally  Abd:soft, nontender, nondistended. Bowel sounds present and normal  LE: + DP and PT pulses bilaterally. Left knee with minimal joint effusion. Tenderness inferolaterally to the patella. No calf tenderness. Pain radiates to mid thigh. Skin without erythema or edema.   ED Course, Procedures, & Data      Procedures       Results for orders placed or performed during the hospital encounter of 02/23/25   Basic metabolic panel     Status: Abnormal   Result Value Ref Range    Sodium 138 135 - 145 mmol/L    Potassium 4.0 3.4 - 5.3 mmol/L    Chloride 104 98 - 107 mmol/L    Carbon Dioxide (CO2) 23 22 - 29 mmol/L    Anion Gap 11 7 - 15 mmol/L    Urea Nitrogen 6.4 6.0 - 20.0 mg/dL    Creatinine 0.50 (L) 0.51 - 0.95 mg/dL    GFR Estimate >90 >60 mL/min/1.73m2    Calcium 8.6 (L) 8.8 - 10.4 mg/dL    Glucose 94 70 - 99 mg/dL   HCG qualitative pregnancy (blood)     Status: Normal   Result Value Ref Range    hCG Serum Qualitative Negative Negative   CBC with platelets and differential     Status: Abnormal   Result Value Ref Range    WBC Count 12.1 (H) 4.0 - 11.0 10e3/uL    RBC Count 2.40 (L) 3.80 - 5.20 10e6/uL    Hemoglobin 7.3 (L) 11.7 - 15.7 g/dL    Hematocrit 20.6 (L) 35.0 - 47.0 % "    MCV 86 78 - 100 fL    MCH 30.4 26.5 - 33.0 pg    MCHC 35.4 31.5 - 36.5 g/dL    RDW 21.2 (H) 10.0 - 15.0 %    Platelet Count 480 (H) 150 - 450 10e3/uL    % Neutrophils 63 %    % Lymphocytes 24 %    % Monocytes 7 %    % Eosinophils 4 %    % Basophils 2 %    % Immature Granulocytes 0 %    NRBCs per 100 WBC 1 (H) <1 /100    Absolute Neutrophils 7.6 1.6 - 8.3 10e3/uL    Absolute Lymphocytes 2.9 0.8 - 5.3 10e3/uL    Absolute Monocytes 0.9 0.0 - 1.3 10e3/uL    Absolute Eosinophils 0.4 0.0 - 0.7 10e3/uL    Absolute Basophils 0.3 (H) 0.0 - 0.2 10e3/uL    Absolute Immature Granulocytes 0.1 <=0.4 10e3/uL    Absolute NRBCs 0.1 10e3/uL   CBC with platelets differential     Status: Abnormal    Narrative    The following orders were created for panel order CBC with platelets differential.  Procedure                               Abnormality         Status                     ---------                               -----------         ------                     CBC with platelets and d...[[id number]]  Abnormal            Final result                 Please view results for these tests on the individual orders.     Medications   HYDROmorphone (DILAUDID) injection 1 mg (1 mg Intravenous $Given 2/23/25 5792)   ketorolac (TORADOL) injection 30 mg (30 mg Intravenous $Given 2/23/25 2610)   ondansetron (ZOFRAN) injection 8 mg (8 mg Intravenous $Given 2/23/25 7184)   heparin lock flush 100 unit/mL injection 100 Units (100 Units Intravenous $Given 2/23/25 4816)     Labs Ordered and Resulted from Time of ED Arrival to Time of ED Departure   BASIC METABOLIC PANEL - Abnormal       Result Value    Sodium 138      Potassium 4.0      Chloride 104      Carbon Dioxide (CO2) 23      Anion Gap 11      Urea Nitrogen 6.4      Creatinine 0.50 (*)     GFR Estimate >90      Calcium 8.6 (*)     Glucose 94     CBC WITH PLATELETS AND DIFFERENTIAL - Abnormal    WBC Count 12.1 (*)     RBC Count 2.40 (*)     Hemoglobin 7.3 (*)     Hematocrit 20.6 (*)     MCV  86      MCH 30.4      MCHC 35.4      RDW 21.2 (*)     Platelet Count 480 (*)     % Neutrophils 63      % Lymphocytes 24      % Monocytes 7      % Eosinophils 4      % Basophils 2      % Immature Granulocytes 0      NRBCs per 100 WBC 1 (*)     Absolute Neutrophils 7.6      Absolute Lymphocytes 2.9      Absolute Monocytes 0.9      Absolute Eosinophils 0.4      Absolute Basophils 0.3 (*)     Absolute Immature Granulocytes 0.1      Absolute NRBCs 0.1     HCG QUALITATIVE PREGNANCY - Normal    hCG Serum Qualitative Negative       No orders to display          Critical care was not performed.     Medical Decision Making  The patient's presentation was of high complexity (a chronic illness severe exacerbation, progression, or side effect of treatment).    The patient's evaluation involved:  ordering and/or review of 3+ test(s) in this encounter (see separate area of note for details)    The patient's management necessitated high risk (a parenteral controlled substance).    Assessment & Plan    23 yo F presenting with acute on chronic pain.   In left knee and thigh.   Vitals stable.   Has pain management care plan that we followed, but I am skeptical if this is sickle cell pain. More likely acute pain from knee injury.   Pt to continue supportive care, PT and keep appointment for scheduled MRI.   Given knee immobilizer for comfort/prevention of feeling of instability when standing & walking. Not able to use crutches.   Referred for follow up with Sports Medicine.       I have reviewed the nursing notes. I have reviewed the findings, diagnosis, plan and need for follow up with the patient.    Discharge Medication List as of 2/23/2025  8:21 AM          Final diagnoses:   Sickle cell disease with crisis (H)   Sprain of left knee, unspecified ligament, initial encounter       Cornelia Malloy MD  [facility] EMERGENCY DEPARTMENT  2/23/2025     Cornelia Malloy MD  02/25/25 [facility]       Cornelia Malloy,  MD  02/25/25 [phone]

## 2025-02-23 NOTE — DISCHARGE INSTRUCTIONS
Thank you for coming to the [facility] Department.     Wear the knee immobilizer when standing or walking. OK to remove to shower/bathe or when laying/sleeping.   Continue to apply ice to the sore knee.   Keep your scheduled appointments for MRI on Thursday and with PT.   Referral to Sports Medicine placed, expect a call to schedule. You should plan to have this appointment after the MRI is complete so that they can review it at your follow up.

## 2025-02-24 ENCOUNTER — PATIENT OUTREACH (OUTPATIENT)
Dept: CARE COORDINATION | Facility: CLINIC | Age: 25
End: 2025-02-24
Payer: COMMERCIAL

## 2025-02-25 ENCOUNTER — INFUSION THERAPY VISIT (OUTPATIENT)
Dept: TRANSPLANT | Facility: CLINIC | Age: 25
End: 2025-02-25
Attending: PEDIATRICS
Payer: COMMERCIAL

## 2025-02-25 ENCOUNTER — NURSE TRIAGE (OUTPATIENT)
Dept: ONCOLOGY | Facility: CLINIC | Age: 25
End: 2025-02-25

## 2025-02-25 ENCOUNTER — THERAPY VISIT (OUTPATIENT)
Dept: PHYSICAL THERAPY | Facility: CLINIC | Age: 25
End: 2025-02-25
Attending: PEDIATRICS
Payer: COMMERCIAL

## 2025-02-25 VITALS
SYSTOLIC BLOOD PRESSURE: 127 MMHG | DIASTOLIC BLOOD PRESSURE: 82 MMHG | HEART RATE: 97 BPM | RESPIRATION RATE: 16 BRPM | OXYGEN SATURATION: 92 % | TEMPERATURE: 98.2 F

## 2025-02-25 DIAGNOSIS — G81.10 SPASTIC HEMIPLEGIA, UNSPECIFIED ETIOLOGY, UNSPECIFIED LATERALITY (H): ICD-10-CM

## 2025-02-25 DIAGNOSIS — M25.562 ACUTE PAIN OF LEFT KNEE: Primary | ICD-10-CM

## 2025-02-25 DIAGNOSIS — D57.00 SICKLE CELL PAIN CRISIS (H): Primary | ICD-10-CM

## 2025-02-25 PROCEDURE — 96361 HYDRATE IV INFUSION ADD-ON: CPT

## 2025-02-25 PROCEDURE — 250N000013 HC RX MED GY IP 250 OP 250 PS 637: Performed by: PEDIATRICS

## 2025-02-25 PROCEDURE — 96376 TX/PRO/DX INJ SAME DRUG ADON: CPT

## 2025-02-25 PROCEDURE — 96374 THER/PROPH/DIAG INJ IV PUSH: CPT

## 2025-02-25 PROCEDURE — 97112 NEUROMUSCULAR REEDUCATION: CPT | Mod: GP

## 2025-02-25 PROCEDURE — 258N000003 HC RX IP 258 OP 636: Performed by: PEDIATRICS

## 2025-02-25 PROCEDURE — 96375 TX/PRO/DX INJ NEW DRUG ADDON: CPT

## 2025-02-25 PROCEDURE — 250N000011 HC RX IP 250 OP 636: Mod: JZ | Performed by: PEDIATRICS

## 2025-02-25 PROCEDURE — 97110 THERAPEUTIC EXERCISES: CPT | Mod: GP

## 2025-02-25 RX ORDER — DIPHENHYDRAMINE HCL 25 MG
50 CAPSULE ORAL
Status: COMPLETED | OUTPATIENT
Start: 2025-02-25 | End: 2025-02-25

## 2025-02-25 RX ORDER — HEPARIN SODIUM (PORCINE) LOCK FLUSH IV SOLN 100 UNIT/ML 100 UNIT/ML
5 SOLUTION INTRAVENOUS
Status: CANCELLED | OUTPATIENT
Start: 2025-04-01

## 2025-02-25 RX ORDER — ONDANSETRON 4 MG/1
8 TABLET, FILM COATED ORAL
Status: CANCELLED
Start: 2025-04-01

## 2025-02-25 RX ORDER — HEPARIN SODIUM (PORCINE) LOCK FLUSH IV SOLN 100 UNIT/ML 100 UNIT/ML
5 SOLUTION INTRAVENOUS EVERY 8 HOURS
Status: DISCONTINUED | OUTPATIENT
Start: 2025-02-25 | End: 2025-02-25 | Stop reason: HOSPADM

## 2025-02-25 RX ORDER — HEPARIN SODIUM,PORCINE 10 UNIT/ML
5 VIAL (ML) INTRAVENOUS
Status: CANCELLED | OUTPATIENT
Start: 2025-04-01

## 2025-02-25 RX ORDER — KETOROLAC TROMETHAMINE 30 MG/ML
30 INJECTION, SOLUTION INTRAMUSCULAR; INTRAVENOUS ONCE
Status: COMPLETED | OUTPATIENT
Start: 2025-02-25 | End: 2025-02-25

## 2025-02-25 RX ORDER — ONDANSETRON 2 MG/ML
8 INJECTION INTRAMUSCULAR; INTRAVENOUS EVERY 6 HOURS PRN
Status: CANCELLED
Start: 2025-04-01

## 2025-02-25 RX ORDER — DIPHENHYDRAMINE HCL 25 MG
50 CAPSULE ORAL
Status: CANCELLED
Start: 2025-04-01

## 2025-02-25 RX ORDER — KETOROLAC TROMETHAMINE 30 MG/ML
30 INJECTION, SOLUTION INTRAMUSCULAR; INTRAVENOUS ONCE
Status: CANCELLED
Start: 2025-04-01 | End: 2025-04-01

## 2025-02-25 RX ORDER — ONDANSETRON 2 MG/ML
8 INJECTION INTRAMUSCULAR; INTRAVENOUS EVERY 6 HOURS PRN
Status: DISCONTINUED | OUTPATIENT
Start: 2025-02-25 | End: 2025-02-25 | Stop reason: HOSPADM

## 2025-02-25 RX ADMIN — HYDROMORPHONE HYDROCHLORIDE 1 MG: 1 INJECTION, SOLUTION INTRAMUSCULAR; INTRAVENOUS; SUBCUTANEOUS at 13:44

## 2025-02-25 RX ADMIN — HEPARIN 5 ML: 100 SYRINGE at 14:49

## 2025-02-25 RX ADMIN — ONDANSETRON 8 MG: 2 INJECTION INTRAMUSCULAR; INTRAVENOUS at 11:36

## 2025-02-25 RX ADMIN — KETOROLAC TROMETHAMINE 30 MG: 30 INJECTION, SOLUTION INTRAMUSCULAR; INTRAVENOUS at 11:37

## 2025-02-25 RX ADMIN — DIPHENHYDRAMINE HYDROCHLORIDE 50 MG: 25 CAPSULE ORAL at 11:34

## 2025-02-25 RX ADMIN — HYDROMORPHONE HYDROCHLORIDE 1 MG: 1 INJECTION, SOLUTION INTRAMUSCULAR; INTRAVENOUS; SUBCUTANEOUS at 11:37

## 2025-02-25 RX ADMIN — HYDROMORPHONE HYDROCHLORIDE 1 MG: 1 INJECTION, SOLUTION INTRAMUSCULAR; INTRAVENOUS; SUBCUTANEOUS at 12:42

## 2025-02-25 RX ADMIN — SODIUM CHLORIDE, POTASSIUM CHLORIDE, SODIUM LACTATE AND CALCIUM CHLORIDE 1000 ML: 600; 310; 30; 20 INJECTION, SOLUTION INTRAVENOUS at 11:28

## 2025-02-25 ASSESSMENT — PAIN SCALES - GENERAL: PAINLEVEL_OUTOF10: SEVERE PAIN (10)

## 2025-02-25 NOTE — PROGRESS NOTES
Infusion Nursing Note:  Jennifer Cervantes presents today for IVF and pain meds.    Patient seen by provider today: No   present during visit today: Not Applicable.    Note: No labs/no provider today. Patient presents after new PT eval, c/o 10/10 pain in her legs. 1L LR given over 2 hours. Given 50mg PO Benadryl, 8mg IVP Zofran, 30mg IV Toradol and 1mg IV Dilaudid Q1HR for max of 3 doses. Patient stated pain improved to 4/10 upon discharge.    Intravenous Access:  Implanted Port.  +BR noted pre and post infusion  De-accessed prior to discharge.    Treatment Conditions:  Not Applicable.    Post Infusion Assessment:  Patient tolerated infusion without incident.     Discharge Plan:   Patient discharged in stable condition accompanied by: self.  Departure Mode: Ambulatory.    Allison Wiggins RN

## 2025-02-25 NOTE — TELEPHONE ENCOUNTER
"Jennifer is calling to ask if she can get in for infusion today. She was helping her mom get the house in order yesterday and today, she feels like her R leg \"is going to fall off.\" She has an apt today at 1020 for L leg, but R leg pain started and is progressing.    Called over to [facility] infusion and spoke to Jaida. Per Jaida, [facility] can take pt at 1200.  Informed Jennifer who confirmed she can come to that apt.  Message sent to [facility] to schedule pt in infusion at 1200.     Message routed to Care Team.          "

## 2025-02-25 NOTE — TELEPHONE ENCOUNTER
Oncology Nurse Triage - Sickle Cell Pain Crisis:    Situation: Jennifer  calling about Sickle Cell Pain Crisis, requesting to be added on for IV fluids and pain medicine    Background:     Patient's last infusion was 2/19/25 2/23/25 ER visit   Last clinic visit date:1/31/25 [facility]   Does patient have active treatment plan? Yes    Assessment of Symptoms:  Onset/Duration of symptoms: 1 day    Is it typical sickle cell pain? Yes  Location: legs   Character: Sharp  Intensity: 10/10    Any radiation of pain, numbness, tingling, weakness, warmth, swelling, discoloration of arms or legs?No    Fever? No  (if yes max temperature recorded in last 24 hours):      Chest Pain Present: No    Shortness of breath: No    Other home therapies tried: HEAT/HEATING PAD and WARM BATH     Last home medication taken and when: oxycodone at 6am     Any Refills Needed?: No    Does patient have transportation & length of time to get to clinic: Yes has an appt at 10:20 PT       Recommendations:   Epic secure chat sent to Patricia Mantilla, who approved she can be added to list   Placed on infusion call list     If you do not hear from the infusion center by 2pm then you will not be able to get in for an infusion today. If symptoms worsen while waiting for call back, and/or you experience fever, chills, SOB, chest pain, cough, n/v, dizziness, numbness, swelling, discoloration of extremities, then seek emergency evaluation in Emergency Department.     Please note, if you are late for your appt, you risk losing your infusion appt as it may delay another patient's infusion who arrived on time.

## 2025-02-26 RX ORDER — DIPHENHYDRAMINE HYDROCHLORIDE 50 MG/ML
50 INJECTION INTRAMUSCULAR; INTRAVENOUS
Start: 2025-02-27

## 2025-02-26 RX ORDER — EPINEPHRINE 1 MG/ML
0.3 INJECTION, SOLUTION INTRAMUSCULAR; SUBCUTANEOUS EVERY 5 MIN PRN
OUTPATIENT
Start: 2025-02-27

## 2025-02-26 RX ORDER — MEPERIDINE HYDROCHLORIDE 25 MG/ML
25 INJECTION INTRAMUSCULAR; INTRAVENOUS; SUBCUTANEOUS
OUTPATIENT
Start: 2025-02-27

## 2025-02-26 RX ORDER — HEPARIN SODIUM (PORCINE) LOCK FLUSH IV SOLN 100 UNIT/ML 100 UNIT/ML
5 SOLUTION INTRAVENOUS
OUTPATIENT
Start: 2025-02-27

## 2025-02-26 RX ORDER — ALBUTEROL SULFATE 90 UG/1
1-2 INHALANT RESPIRATORY (INHALATION)
Start: 2025-02-27

## 2025-02-26 RX ORDER — HEPARIN SODIUM,PORCINE 10 UNIT/ML
5 VIAL (ML) INTRAVENOUS
OUTPATIENT
Start: 2025-02-27

## 2025-02-26 RX ORDER — DIPHENHYDRAMINE HYDROCHLORIDE 50 MG/ML
25 INJECTION INTRAMUSCULAR; INTRAVENOUS
Start: 2025-02-27

## 2025-02-26 RX ORDER — ALBUTEROL SULFATE 0.83 MG/ML
2.5 SOLUTION RESPIRATORY (INHALATION)
OUTPATIENT
Start: 2025-02-27

## 2025-02-26 RX ORDER — METHYLPREDNISOLONE SODIUM SUCCINATE 40 MG/ML
40 INJECTION INTRAMUSCULAR; INTRAVENOUS
Start: 2025-02-27

## 2025-02-26 NOTE — TELEPHONE ENCOUNTER
DIAGNOSIS: L knee strain/Dr. Malloy/HP/ortho con     APPOINTMENT DATE: 3.5.25   NOTES STATUS DETAILS   OFFICE NOTE from other specialist Internal 2.25.25, 2.12.25  Jose  PT    2.4.25  Manpreet  IM    6.29.20  Alex Celis   DISCHARGE REPORT from the ER Internal 2.23.25  [place]  [facility]   MEDICATION LIST Internal    MRI In process *sched* for 2.27.25  MR Knee Left   XRAYS (IMAGES & REPORTS) Internal 2.4.25, 6.26.20  XR Knee Left

## 2025-02-27 ENCOUNTER — PATIENT OUTREACH (OUTPATIENT)
Dept: ONCOLOGY | Facility: CLINIC | Age: 25
End: 2025-02-27

## 2025-02-27 ENCOUNTER — INFUSION THERAPY VISIT (OUTPATIENT)
Dept: ONCOLOGY | Facility: CLINIC | Age: 25
End: 2025-02-27
Payer: COMMERCIAL

## 2025-02-27 ENCOUNTER — ONCOLOGY VISIT (OUTPATIENT)
Dept: ONCOLOGY | Facility: CLINIC | Age: 25
End: 2025-02-27
Attending: REGISTERED NURSE
Payer: COMMERCIAL

## 2025-02-27 ENCOUNTER — NURSE TRIAGE (OUTPATIENT)
Dept: ONCOLOGY | Facility: CLINIC | Age: 25
End: 2025-02-27

## 2025-02-27 ENCOUNTER — ANCILLARY PROCEDURE (OUTPATIENT)
Dept: MRI IMAGING | Facility: CLINIC | Age: 25
End: 2025-02-27
Attending: PEDIATRICS
Payer: COMMERCIAL

## 2025-02-27 ENCOUNTER — LAB (OUTPATIENT)
Dept: LAB | Facility: CLINIC | Age: 25
End: 2025-02-27
Payer: COMMERCIAL

## 2025-02-27 VITALS
OXYGEN SATURATION: 98 % | RESPIRATION RATE: 18 BRPM | WEIGHT: 160.1 LBS | TEMPERATURE: 98.2 F | SYSTOLIC BLOOD PRESSURE: 115 MMHG | HEART RATE: 105 BPM | BODY MASS INDEX: 27.48 KG/M2 | DIASTOLIC BLOOD PRESSURE: 61 MMHG

## 2025-02-27 DIAGNOSIS — E83.111 IRON OVERLOAD DUE TO REPEATED RED BLOOD CELL TRANSFUSIONS: ICD-10-CM

## 2025-02-27 DIAGNOSIS — D57.00 SICKLE CELL DISEASE WITH CRISIS (H): ICD-10-CM

## 2025-02-27 DIAGNOSIS — M25.562 ACUTE PAIN OF LEFT KNEE: ICD-10-CM

## 2025-02-27 DIAGNOSIS — D57.00 SICKLE CELL PAIN CRISIS (H): Primary | ICD-10-CM

## 2025-02-27 DIAGNOSIS — G81.10 SPASTIC HEMIPLEGIA, UNSPECIFIED ETIOLOGY, UNSPECIFIED LATERALITY (H): ICD-10-CM

## 2025-02-27 DIAGNOSIS — S83.8X2A MENISCAL INJURY, LEFT, INITIAL ENCOUNTER: ICD-10-CM

## 2025-02-27 DIAGNOSIS — D57.00 SICKLE CELL DISEASE WITH CRISIS (H): Primary | ICD-10-CM

## 2025-02-27 DIAGNOSIS — I69.351 HEMIPLEGIA AND HEMIPARESIS FOLLOWING CEREBRAL INFARCTION AFFECTING RIGHT DOMINANT SIDE (H): Primary | ICD-10-CM

## 2025-02-27 DIAGNOSIS — M89.9 LESION OF BONE OF KNEE: ICD-10-CM

## 2025-02-27 LAB
ANION GAP SERPL CALCULATED.3IONS-SCNC: 12 MMOL/L (ref 7–15)
BASOPHILS # BLD MANUAL: 0.3 10E3/UL (ref 0–0.2)
BASOPHILS NFR BLD MANUAL: 3 %
BUN SERPL-MCNC: 3.9 MG/DL (ref 6–20)
CALCIUM SERPL-MCNC: 9 MG/DL (ref 8.8–10.4)
CHLORIDE SERPL-SCNC: 106 MMOL/L (ref 98–107)
CREAT SERPL-MCNC: 0.55 MG/DL (ref 0.51–0.95)
EGFRCR SERPLBLD CKD-EPI 2021: >90 ML/MIN/1.73M2
EOSINOPHIL # BLD MANUAL: 0.3 10E3/UL (ref 0–0.7)
EOSINOPHIL NFR BLD MANUAL: 3 %
ERYTHROCYTE [DISTWIDTH] IN BLOOD BY AUTOMATED COUNT: 25 % (ref 10–15)
GLUCOSE SERPL-MCNC: 98 MG/DL (ref 70–99)
HCO3 SERPL-SCNC: 22 MMOL/L (ref 22–29)
HCT VFR BLD AUTO: 20.3 % (ref 35–47)
HGB BLD-MCNC: 7 G/DL (ref 11.7–15.7)
LYMPHOCYTES # BLD MANUAL: 3.8 10E3/UL (ref 0.8–5.3)
LYMPHOCYTES NFR BLD MANUAL: 28 %
MCH RBC QN AUTO: 29.9 PG (ref 26.5–33)
MCHC RBC AUTO-ENTMCNC: 34.5 G/DL (ref 31.5–36.5)
MCV RBC AUTO: 87 FL (ref 78–100)
MONOCYTES # BLD MANUAL: 0.9 10E3/UL (ref 0–1.3)
MONOCYTES NFR BLD MANUAL: 7 %
NEUTROPHILS # BLD MANUAL: 8.3 10E3/UL (ref 1.6–8.3)
NEUTROPHILS NFR BLD MANUAL: 60 %
NRBC # BLD AUTO: 0.9 10E3/UL
NRBC BLD MANUAL-RTO: 7 %
PLAT MORPH BLD: ABNORMAL
PLATELET # BLD AUTO: 483 10E3/UL (ref 150–450)
POLYCHROMASIA BLD QL SMEAR: ABNORMAL
POTASSIUM SERPL-SCNC: 3.4 MMOL/L (ref 3.4–5.3)
RBC # BLD AUTO: 2.34 10E6/UL (ref 3.8–5.2)
RBC MORPH BLD: ABNORMAL
RETICS # AUTO: 0.59 10E6/UL (ref 0.03–0.1)
RETICS/RBC NFR AUTO: 27.6 % (ref 0.5–2)
SICKLE CELLS BLD QL SMEAR: ABNORMAL
SODIUM SERPL-SCNC: 140 MMOL/L (ref 135–145)
WBC # BLD AUTO: 13.7 10E3/UL (ref 4–11)

## 2025-02-27 PROCEDURE — 85045 AUTOMATED RETICULOCYTE COUNT: CPT | Performed by: PEDIATRICS

## 2025-02-27 PROCEDURE — 250N000011 HC RX IP 250 OP 636: Performed by: REGISTERED NURSE

## 2025-02-27 PROCEDURE — 258N000003 HC RX IP 258 OP 636: Performed by: PEDIATRICS

## 2025-02-27 PROCEDURE — 36591 DRAW BLOOD OFF VENOUS DEVICE: CPT | Performed by: PEDIATRICS

## 2025-02-27 PROCEDURE — 84295 ASSAY OF SERUM SODIUM: CPT | Performed by: PEDIATRICS

## 2025-02-27 PROCEDURE — 73721 MRI JNT OF LWR EXTRE W/O DYE: CPT | Mod: LT | Performed by: RADIOLOGY

## 2025-02-27 PROCEDURE — G0463 HOSPITAL OUTPT CLINIC VISIT: HCPCS | Performed by: REGISTERED NURSE

## 2025-02-27 PROCEDURE — 250N000011 HC RX IP 250 OP 636: Mod: JZ | Performed by: PEDIATRICS

## 2025-02-27 PROCEDURE — 85007 BL SMEAR W/DIFF WBC COUNT: CPT | Performed by: PEDIATRICS

## 2025-02-27 PROCEDURE — 82435 ASSAY OF BLOOD CHLORIDE: CPT | Performed by: PEDIATRICS

## 2025-02-27 PROCEDURE — 85018 HEMOGLOBIN: CPT | Performed by: PEDIATRICS

## 2025-02-27 PROCEDURE — 250N000013 HC RX MED GY IP 250 OP 250 PS 637: Performed by: PEDIATRICS

## 2025-02-27 RX ORDER — ONDANSETRON 2 MG/ML
8 INJECTION INTRAMUSCULAR; INTRAVENOUS EVERY 6 HOURS PRN
Status: DISCONTINUED | OUTPATIENT
Start: 2025-02-27 | End: 2025-02-27 | Stop reason: HOSPADM

## 2025-02-27 RX ORDER — HEPARIN SODIUM (PORCINE) LOCK FLUSH IV SOLN 100 UNIT/ML 100 UNIT/ML
5 SOLUTION INTRAVENOUS
Status: DISCONTINUED | OUTPATIENT
Start: 2025-02-27 | End: 2025-02-27 | Stop reason: HOSPADM

## 2025-02-27 RX ORDER — ONDANSETRON 2 MG/ML
8 INJECTION INTRAMUSCULAR; INTRAVENOUS EVERY 6 HOURS PRN
Start: 2025-04-01

## 2025-02-27 RX ORDER — HEPARIN SODIUM (PORCINE) LOCK FLUSH IV SOLN 100 UNIT/ML 100 UNIT/ML
5 SOLUTION INTRAVENOUS ONCE
Status: COMPLETED | OUTPATIENT
Start: 2025-02-27 | End: 2025-02-27

## 2025-02-27 RX ORDER — OXYCODONE HYDROCHLORIDE 10 MG/1
10 TABLET ORAL EVERY 6 HOURS PRN
Qty: 12 TABLET | Refills: 0 | Status: SHIPPED | OUTPATIENT
Start: 2025-02-27

## 2025-02-27 RX ORDER — ONDANSETRON 4 MG/1
8 TABLET, FILM COATED ORAL
Start: 2025-04-01

## 2025-02-27 RX ORDER — KETOROLAC TROMETHAMINE 30 MG/ML
30 INJECTION, SOLUTION INTRAMUSCULAR; INTRAVENOUS ONCE
Status: COMPLETED | OUTPATIENT
Start: 2025-02-27 | End: 2025-02-27

## 2025-02-27 RX ORDER — KETOROLAC TROMETHAMINE 30 MG/ML
30 INJECTION, SOLUTION INTRAMUSCULAR; INTRAVENOUS ONCE
Start: 2025-04-01 | End: 2025-04-01

## 2025-02-27 RX ORDER — HEPARIN SODIUM,PORCINE 10 UNIT/ML
5 VIAL (ML) INTRAVENOUS
OUTPATIENT
Start: 2025-04-01

## 2025-02-27 RX ORDER — HEPARIN SODIUM (PORCINE) LOCK FLUSH IV SOLN 100 UNIT/ML 100 UNIT/ML
5 SOLUTION INTRAVENOUS
OUTPATIENT
Start: 2025-04-01

## 2025-02-27 RX ORDER — DIPHENHYDRAMINE HCL 25 MG
50 CAPSULE ORAL
Status: COMPLETED | OUTPATIENT
Start: 2025-02-27 | End: 2025-02-27

## 2025-02-27 RX ORDER — DIPHENHYDRAMINE HCL 25 MG
50 CAPSULE ORAL
Start: 2025-04-01

## 2025-02-27 RX ADMIN — DIPHENHYDRAMINE HYDROCHLORIDE 50 MG: 25 CAPSULE ORAL at 12:52

## 2025-02-27 RX ADMIN — SODIUM CHLORIDE 363 MG: 9 INJECTION, SOLUTION INTRAVENOUS at 13:03

## 2025-02-27 RX ADMIN — SODIUM CHLORIDE 250 ML: 9 INJECTION, SOLUTION INTRAVENOUS at 13:00

## 2025-02-27 RX ADMIN — HYDROMORPHONE HYDROCHLORIDE 1 MG: 1 INJECTION, SOLUTION INTRAMUSCULAR; INTRAVENOUS; SUBCUTANEOUS at 15:03

## 2025-02-27 RX ADMIN — HYDROMORPHONE HYDROCHLORIDE 1 MG: 1 INJECTION, SOLUTION INTRAMUSCULAR; INTRAVENOUS; SUBCUTANEOUS at 12:53

## 2025-02-27 RX ADMIN — HEPARIN 5 ML: 100 SYRINGE at 15:06

## 2025-02-27 RX ADMIN — Medication 5 ML: at 11:37

## 2025-02-27 RX ADMIN — SODIUM CHLORIDE, POTASSIUM CHLORIDE, SODIUM LACTATE AND CALCIUM CHLORIDE 1000 ML: 600; 310; 30; 20 INJECTION, SOLUTION INTRAVENOUS at 12:30

## 2025-02-27 RX ADMIN — HYDROMORPHONE HYDROCHLORIDE 1 MG: 1 INJECTION, SOLUTION INTRAMUSCULAR; INTRAVENOUS; SUBCUTANEOUS at 14:05

## 2025-02-27 RX ADMIN — ONDANSETRON 8 MG: 2 INJECTION INTRAMUSCULAR; INTRAVENOUS at 12:53

## 2025-02-27 RX ADMIN — KETOROLAC TROMETHAMINE 30 MG: 30 INJECTION, SOLUTION INTRAMUSCULAR; INTRAVENOUS at 12:54

## 2025-02-27 ASSESSMENT — PAIN SCALES - GENERAL: PAINLEVEL_OUTOF10: SEVERE PAIN (9)

## 2025-02-27 NOTE — TELEPHONE ENCOUNTER
Ange can add IVF/PM to Jennifer's Jr appt.   Left message for Jennifer that they are able to add on IVF/PM to her jr appt today.

## 2025-02-27 NOTE — NURSING NOTE
"Oncology Rooming Note    February 27, 2025 11:41 AM   Jennifer Cervantes is a 24 year old female who presents for:    Chief Complaint   Patient presents with    Blood Draw     Labs obtained via noncoring powerport 20 gauge, 3/4 inch needle, heparin flushed, VS taken, checked into next appt    Oncology Clinic Visit     Sickle cell anemia     Initial Vitals: /61   Pulse 105   Temp 98.2  F (36.8  C) (Oral)   Resp 18   Wt 72.6 kg (160 lb 1.6 oz)   SpO2 98%   BMI 27.48 kg/m   Estimated body mass index is 27.48 kg/m  as calculated from the following:    Height as of 2/23/25: 1.626 m (5' 4\").    Weight as of this encounter: 72.6 kg (160 lb 1.6 oz). Body surface area is 1.81 meters squared.  Severe Pain (9) Comment: Data Unavailable   No LMP recorded. Patient has had an implant.  Allergies reviewed: Yes  Medications reviewed: Yes    Medications: Medication refills not needed today.  Pharmacy name entered into [facility]:    [facility] [place], [place] - [phone]  [facility] - [place], [place] - [address] [place] [address]    Frailty Screening:   Is the patient here for a new oncology consult visit in cancer care? 2. No      Clinical concerns: Pt reports no new concerns today.       Libertad Sy, EMT     "

## 2025-02-27 NOTE — PROGRESS NOTES
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 She had multiple thromboembolic events in 2021 despite adherent anticoagulation use (though warfarin was perpetually low) and there are concerns for chronic thromboembolic disease but did not have pulmonary HTN on a November 2021 cath. She is maintained on chronic PO opioids and twice-weekly infusion visits (since 1/24/22) but has been able to be maintained on this regimen and has stayed out of the ED most of the time with even rarer admissions for most of 2023. In 2024, her ED visits have increased somewhat but admissions remain rare.    Interval History:  Jennifer is seen for routine follow-up and sidney today.    Pain has been rather well-controlled lately. Recently had to go to the ED due to ongoing left knee pain. Continue to follow with Dr. Case for workup of this. Started physical therapy which she has found beneficial for suspected meniscus injury. Gait recently has been stable. Reports hurting her knee initially when packing clothing at her house and falling a short distance directly onto the left knee. MRI completed this morning with pending results.    She's been proud of the fact that she's only had 5 ED visits so far this calendar year. She does not desire to change her oxycodone prescription at this time. She typically takes 1 tablet of oxycodone twice a day but is working toward using this more on a PRN basis.     She continues to see a therapist routinely which she has found very beneficial. She finds  herself crying during most appointments when reflecting on challenging events or relationships she's experienced throughout her life. She values the ability to have an outlet and person to safely express her feelings.    Her birthday is next week. She is looking forward to spending time with family and plans to go to the [facility] with her mom.     Sickle Cell Disease Comprehensive Checklist  Bone Health/Avascular Necrosis Screening/Symptoms (each visit): no new concerns today  Leg Ulcer evaluation (every visit): nothing to note  Hypertension (every visit):stable none for several months  Last pulmonary evaluation (asthma, AMAN, pulm HTN): 9/28/22, due for follow-up  Stroke/silent cerebral infarct Hx (Y/N): Yes TIA ~2014, first event ~age 1 with full stroke and R sided weakness  Last PCP Visit: 9/30/24 with Dr. Case  Vaccines:  PCV13: 5/13/19  Pneumovax (PPSV23): 3/04, 10/09, 7/12/19, 10/2024  Menactra: 4/2010, 9/2015 (MCV done 8/16/21)  MenB: 9/16/15, 5/13/19, 1/30/25  Influenza: 10/11/24  Audiology (chelation): done 2/27/24    Comparison to Previous Audiogram dated 6/6/2022:     Pure Tone Thresholds 250-8000 Hz:    RIGHT: stable    LEFT: stable     High Frequency Audiometry 9,000-16,000Hz:     RIGHT: stable    LEFT: stable     Word Recognition Score:    RIGHT: stable    LEFT: stable    Plan last reviewed with patient: 10/2/23    Patient background: 22 yo F, enjoys movies and kids though there are times where she does not really want to talk to people. Does not have a lot of social support at home.     Sickle Cell Disease History  Primary Hematologist Team: Jose Rafael Duncan  PCP: none  Genotype: SS  Acute Pain Crisis Treatment: (limiting IV)   ER   Dilaudid 1 mg IV q1h up to 3 doses  Toradol 30 mg IV x1   Maintenance IV fluids with LR  Other: Zofran 8 mg IV PRN nausea  Inpatient:  PCA Dilaudid 1 hr q30 minutes, no basal rate  Toradol 30 mg x6h x 4 hr  LR maintenance x 1-2 days  Other Medications:  Zofran  ASA  Supportive Care: Docusate, Senna  Chronic Pain Medications:    Home regimen: oxycodone 15 mg p.o. q.4-6 hours p.r.n. breakthrough pain.  She also continues on Voltaren gel, and Zoloft among other medications.    -Also benefits from mental health visits, acupuncture  Baseline Hemoglobin: 7 g/dl without chronic transfusions  Hydroxyurea use: Yes  H/O blood transfusions: Yes, several (iron overload) Most recent 11/20/2021  H/O Transfusion Reactions: no  Antibodies:none  H/O Acute Chest Syndrome: Yes  Last episode:9/05/22 (previously 4/26/21, 10/2019)   ICU/intubation: not with 9/2022 admission  H/O Stroke: Yes (managed with chronic transfusions in the past, stopped late Spring 2020)  H/O VTE: Yes (2/2021)  H/O Cholecystectomy or Splenectomy: no  H/O Asthma, Pulm HTN, AVN, Leg Ulcers, Nephropathy, Retinopathy, etc: Iron overload, asthma, chronic lung disease, physical limitations from early stroke    ---------------------------------------  Jennifer Cervantes's Goals (discussed 1/30/25)    1-3 month goal:  Continue weekly therapy    6 month goal:      12 month goal:      Disease-specific goal(s):  Continue to decrease ED visits        Current Outpatient Medications   Medication Sig Dispense Refill    albuterol (PROVENTIL) (2.5 MG/3ML) 0.083% neb solution Take 2 vials (5 mg) by nebulization every 6 hours as needed for shortness of breath or wheezing. 90 mL 3    aspirin (ASA) 81 MG chewable tablet Take 1 tablet (81 mg) by mouth 2 times daily 60 tablet 11    budesonide-formoterol (SYMBICORT/BREYNA) 160-4.5 MCG/ACT Inhaler Inhale 2 puffs twice daily plus 1-2 puffs as needed. May use up to 12 puffs per day. 20.4 g 11    deferasirox (JADENU) 360 MG tablet Take 4 tablets (1,440 mg) by mouth daily. 120 tablet 11    Deferiprone, Twice Daily, 1000 MG TABS Take 2,000 mg by mouth 2 times daily. 150 tablet 3    diphenhydrAMINE (BENADRYL) 50 MG capsule Administer 12  hours pre - IV contrast injection and 2 hours prior to  contrast. Patient must have a . 2 capsule 0    EPINEPHrine (ANY BX GENERIC EQUIV) 0.3 MG/0.3ML injection 2-pack Inject 0.3 mLs (0.3 mg) into the muscle as needed for anaphylaxis. 1 each 1    gabapentin (NEURONTIN) 300 MG capsule Take 1 capsule (300 mg) by mouth 3 times daily. Take PO 1 pill in evening (300 mg) TID to start. If tolerated, increase by 300 mg in morning or lunch every few days, updating your doctor's office in time to get refills. The maximum dose is 900 mg TID. (Patient taking differently: Take 600 mg by mouth at bedtime. Take PO 1 pill in evening (300 mg) TID to start. If tolerated, increase by 300 mg in morning or lunch every few days, updating your doctor's office in time to get refills. The maximum dose is 900 mg TID.) 90 capsule 1    hydroxyurea (HYDREA) 500 MG capsule Take 6 capsules (3,000 mg) by mouth daily 180 capsule 11    ibuprofen (ADVIL/MOTRIN) 800 MG tablet Take 800 mg by mouth every 8 hours as needed for moderate pain      methocarbamol (ROBAXIN) 750 MG tablet Take 1 tablet (750 mg) by mouth 4 times daily as needed for muscle spasms (during sickle pain crises. Okay to take scheduled while in pain). 60 tablet 1    methylPREDNISolone (MEDROL) 32 MG tablet Take 1 tab 12 hours before appointment and 1 tab 2 hours prior to the procedure with IV contrast. 2 tablet 0    naloxone (NARCAN) 4 MG/0.1ML nasal spray Spray 4 mg into one nostril alternating nostrils as needed for opioid reversal. every 2-3 minutes until assistance arrives 0.2 mL 0    oxyCODONE IR (ROXICODONE) 10 MG tablet Take 1 tablet (10 mg) by mouth every 6 hours as needed for severe pain (Goal of less than 4 per day). 12 tablet 0     Past Medical History  Past Medical History:   Diagnosis Date    Anxiety     Bleeding disorder     Blood clotting disorder     Cerebral infarction (H) 2015    Cognitive developmental delay     low IQ. Please recognize when managing pain and planning with her    Depressive disorder     Hemiplegia  and hemiparesis following cerebral infarction affecting right dominant side (H)     right hand contractures    Iron overload due to repeated red blood cell transfusions     Migraines     Multiple subsegmental pulmonary emboli without acute cor pulmonale (H) 02/01/2021    Oppositional defiant behavior     Presence of intrauterine contraceptive device 2/18/2020    Superficial venous thrombosis of arm, right 03/25/2021    Uncomplicated asthma      Past Surgical History:   Procedure Laterality Date    AS INSERT TUNNELED CV 2 CATH W/O PORT/PUMP      CHOLECYSTECTOMY      CV RIGHT HEART CATH MEASUREMENTS RECORDED N/A 11/18/2021    Procedure: Right Heart Cath;  Surgeon: Jackson Stauffer MD;  Location:  HE[facility] CARD[facility] CATH LAB    INSERT PORT VASCULAR ACCESS Left 4/21/2021    Procedure: INSERTION, VASCULAR ACCESS PORT (NOT SURE ON SIDE UNTIL REMOVAL);  Surgeon: Rajan More MD;  Location: [facility] OR    IR CHEST PORT PLACEMENT > 5 YRS OF AGE  4/21/2021    IR CVC NON TUNNEL LINE REMOVAL  5/6/2021    IR CVC NON TUNNEL PLACEMENT > 5 YRS  04/07/2020    IR CVC NON TUNNEL PLACEMENT > 5 YRS  4/30/2021    IR CVC NON TUNNEL PLACEMENT > 5 YRS  9/7/2022    IR PORT REMOVAL LEFT  4/21/2021    REMOVE PORT VASCULAR ACCESS Left 4/21/2021    Procedure: REMOVAL, VASCULAR ACCESS PORT LEFT;  Surgeon: Rajan More MD;  Location: [facility] OR    REPAIR TENDON ELBOW Right 10/02/2019    Procedure: Right Elbow Flexor Lengthening, Flexor Pronator Slide Of Wrist and Finger, Thumb Adductor Lengthening;  Surgeon: Anai Franco MD;  Location: [facility] OR    TONSILLECTOMY Bilateral 10/02/2019    Procedure: Bilateral Tonsillectomy;  Surgeon: Farhana Guy MD;  Location: [facility] OR    ZZC BREAST REDUCTION (INCLUDES LIPO) TIER 3 Bilateral 04/23/2019     Allergies   Allergen Reactions    Contrast Dye Angioedema     Hives and breathing issues    Fish-Derived Products Hives    Seafood Hives    Adhesive Tape Hives     Primipore dressing causes hives     Gadolinium     Iodinated Contrast Media      Social History   Social History     Tobacco Use    Smoking status: Never     Passive exposure: Never    Smokeless tobacco: Never   Substance Use Topics    Alcohol use: Not Currently     Alcohol/week: 0.0 standard drinks of alcohol    Drug use: Never   Past medical history and social history were reviewed.    Physical Examination:  /61   Pulse 105   Temp 98.2  F (36.8  C) (Oral)   Resp 18   Wt 72.6 kg (160 lb 1.6 oz)   SpO2 98%   BMI 27.48 kg/m       Wt Readings from Last 10 Encounters:   02/27/25 72.6 kg (160 lb 1.6 oz)   02/23/25 72.1 kg (159 lb)   02/14/25 72.4 kg (159 lb 11.2 oz)   02/04/25 73.5 kg (162 lb 1.6 oz)   01/31/25 74.4 kg (164 lb)   01/31/25 74.5 kg (164 lb 3.2 oz)   01/30/25 73.7 kg (162 lb 8 oz)   01/24/25 71 kg (156 lb 8 oz)   01/17/25 71.2 kg (157 lb)   01/02/25 71.6 kg (157 lb 12.8 oz)     General: Pleasant female, NAD  Eyes: EOMI, PERRL  ENT: oral mucosa moist, pink  Respiratory: normal respiratory effort on room air  Ext: no peripheral edema  Neurologic: chronic contracture of right arm and wrist. Steady gait. A/O x 4.  Skin: No rashes, petechiae, or bruising noted on exposed skin.   Laboratory Data:  Recent Results (from the past 240 hours)   HCG qualitative pregnancy (blood)    Collection Time: 02/23/25  5:14 AM   Result Value Ref Range    hCG Serum Qualitative Negative Negative   CBC with platelets and differential    Collection Time: 02/23/25  5:14 AM   Result Value Ref Range    WBC Count 12.1 (H) 4.0 - 11.0 10e3/uL    RBC Count 2.40 (L) 3.80 - 5.20 10e6/uL    Hemoglobin 7.3 (L) 11.7 - 15.7 g/dL    Hematocrit 20.6 (L) 35.0 - 47.0 %    MCV 86 78 - 100 fL    MCH 30.4 26.5 - 33.0 pg    MCHC 35.4 31.5 - 36.5 g/dL    RDW 21.2 (H) 10.0 - 15.0 %    Platelet Count 480 (H) 150 - 450 10e3/uL    % Neutrophils 63 %    % Lymphocytes 24 %    % Monocytes 7 %    % Eosinophils 4 %    % Basophils 2 %    % Immature Granulocytes 0 %    NRBCs per 100 WBC 1  (H) <1 /100    Absolute Neutrophils 7.6 1.6 - 8.3 10e3/uL    Absolute Lymphocytes 2.9 0.8 - 5.3 10e3/uL    Absolute Monocytes 0.9 0.0 - 1.3 10e3/uL    Absolute Eosinophils 0.4 0.0 - 0.7 10e3/uL    Absolute Basophils 0.3 (H) 0.0 - 0.2 10e3/uL    Absolute Immature Granulocytes 0.1 <=0.4 10e3/uL    Absolute NRBCs 0.1 10e3/uL   Basic metabolic panel    Collection Time: 02/23/25  7:25 AM   Result Value Ref Range    Sodium 138 135 - 145 mmol/L    Potassium 4.0 3.4 - 5.3 mmol/L    Chloride 104 98 - 107 mmol/L    Carbon Dioxide (CO2) 23 22 - 29 mmol/L    Anion Gap 11 7 - 15 mmol/L    Urea Nitrogen 6.4 6.0 - 20.0 mg/dL    Creatinine 0.50 (L) 0.51 - 0.95 mg/dL    GFR Estimate >90 >60 mL/min/1.73m2    Calcium 8.6 (L) 8.8 - 10.4 mg/dL    Glucose 94 70 - 99 mg/dL   Basic metabolic panel    Collection Time: 02/27/25 11:29 AM   Result Value Ref Range    Sodium 140 135 - 145 mmol/L    Potassium 3.4 3.4 - 5.3 mmol/L    Chloride 106 98 - 107 mmol/L    Carbon Dioxide (CO2) 22 22 - 29 mmol/L    Anion Gap 12 7 - 15 mmol/L    Urea Nitrogen 3.9 (L) 6.0 - 20.0 mg/dL    Creatinine 0.55 0.51 - 0.95 mg/dL    GFR Estimate >90 >60 mL/min/1.73m2    Calcium 9.0 8.8 - 10.4 mg/dL    Glucose 98 70 - 99 mg/dL   Reticulocyte count    Collection Time: 02/27/25 11:29 AM   Result Value Ref Range    % Reticulocyte 27.6 (H) 0.5 - 2.0 %    Absolute Reticulocyte 0.594 (H) 0.025 - 0.095 10e6/uL   CBC with platelets and differential    Collection Time: 02/27/25 11:29 AM   Result Value Ref Range    WBC Count 13.7 (H) 4.0 - 11.0 10e3/uL    RBC Count 2.34 (L) 3.80 - 5.20 10e6/uL    Hemoglobin 7.0 (L) 11.7 - 15.7 g/dL    Hematocrit 20.3 (L) 35.0 - 47.0 %    MCV 87 78 - 100 fL    MCH 29.9 26.5 - 33.0 pg    MCHC 34.5 31.5 - 36.5 g/dL    RDW 25.0 (H) 10.0 - 15.0 %    Platelet Count 483 (H) 150 - 450 10e3/uL   Manual Differential    Collection Time: 02/27/25 11:29 AM   Result Value Ref Range    % Neutrophils 60 %    % Lymphocytes 28 %    % Monocytes 7 %    %  Eosinophils 3 %    % Basophils 3 %    NRBCs per 100 WBC 7 (H) <=0 %    Absolute Neutrophils 8.3 1.6 - 8.3 10e3/uL    Absolute Lymphocytes 3.8 0.8 - 5.3 10e3/uL    Absolute Monocytes 0.9 0.0 - 1.3 10e3/uL    Absolute Eosinophils 0.3 0.0 - 0.7 10e3/uL    Absolute Basophils 0.3 (H) 0.0 - 0.2 10e3/uL    Absolute NRBCs 0.9 (H) <=0.0 10e3/uL    RBC Morphology Confirmed RBC Indices     Platelet Assessment  Automated Count Confirmed. Platelet morphology is normal.     Automated Count Confirmed. Platelet morphology is normal.    Polychromasia Moderate (A) None Seen    Sickle Cells Moderate (A) None Seen     I reviewed the above labs today.    Assessment and Plan:  1. Sickle Cell HgbSS Disease  2. Acute pain crises  3. Chronic Pain  4. 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 History of CVA  7. Hearing loss  8. Nausea/vomiting     Jennifer was seen for routine sidney infusion and follow-up today. She has made an ongoing effort to significantly reduce her ED visits this year with only 5 so far in 2 months. She continue to use oxycodone rather regularly at home, although only 2 tablets a day on average. I again encouraged her to focus on taking her medications on an as-needed basis which would likely require her to go days without any oxycodone.  We will continue her current home oxycodone prescriptions of 12 tablets per week which has remained stable for the past 3 months.     A Ferriscan was performed 1/30/25 showing a decrease in her average iron concentration, now 24 mg/g dry tissue, previously 31.8. Following discussion with Dr. Duncan we are not going to adjust her chelation at this time and will repeat a Ferriscan in 6 months (June 2025). She remains on Jadenu 1440 mg daily and deferiprone 2000 mg BID.    I am very pleased with her dedication to therapy over the past two months. She has found this extremely beneficial in managing interpersonal relationships with family  members. Health maintenance reviewed today and up-to-date.    We will continue to see her monthly for ANDREAS/MD visits with her sidney infusions.       -------------------------------------  Plan:  -Continue Hydrea to 3000mg daily to help lessen frequency of sickle cell pain.   -Continue monthly crizanlizumab infusions  -Continue to reassess plan for home oxycodone use monthly.  Continue prescriptions for 12 tablets oxycodone per week.  We can continue to decrease weekly if she desires.  We are aiming to avoid any dose or quantity escalations.   -Continue infusion center visits limited to two times per week (Mondays and Fridays ideally although still needs to call to request). She can self-reduce infusion days/week. Continue diligent home management with current medications, heat, rest, compression, warm baths.   -If unable to manage at home can go to ED  with continued plan to use IV Dilaudid and take a break from ketamine (10/2/23). No PCA use and goal for any admission would still be to discharge by 5 days or less  -Hold plan for EGD for evaluation of intermittent n/v, abdominal pain as she has missed multiple scheduled procedures d/t limitations with transportation  -Continue metoclopramide 5 mg TID PRN if this continues to be helpful for nausea/vomiting-denies recent GI concerns  - Continue Jadenu for iron overload and deferiprone. Next Ferriscan due June 2025  -Continue aspirin BID  -RTC with Dr. Duncan 3/17 as planned    Patricia Mantilla CNP  ---  44 minutes spent on the date of the encounter doing chart review, review of test results, interpretation of tests, patient visit, and documentation.    The longitudinal plan of care for the diagnosis(es)/condition(s) as documented were addressed during this visit. Due to the added complexity in care, I will continue to support Jennifer in the subsequent management and with ongoing continuity of care.

## 2025-02-27 NOTE — PROGRESS NOTES
Infusion Nursing Note:  Jennifer Cervantes presents today for IVF/Pain medication/Crizanlizumab.    Patient seen by provider today: Yes: Patricia Mantilla NP   present during visit today: Not Applicable.    Note: Patient endorses constant throbbing back pain with PS 9/10. Non radiating. No new complaints.    Upon discharge patient had 3 doses of Dilaudid with PS 3/10. Patient knows where and when to call if symptoms worsening/ persists.       Intravenous Access:  Implanted Port.    Treatment Conditions:  Lab Results   Component Value Date    HGB 7.0 (L) 02/27/2025    WBC 13.7 (H) 02/27/2025    ANEU 8.3 02/27/2025    ANEUTAUTO 7.6 02/23/2025     (H) 02/27/2025        Lab Results   Component Value Date     02/27/2025    POTASSIUM 3.4 02/27/2025    MAG 2.0 05/16/2024    CR 0.55 02/27/2025    MICAH 9.0 02/27/2025    BILITOTAL 3.3 (H) 02/14/2025    ALBUMIN 4.7 02/14/2025    ALT 21 02/14/2025    AST 59 (H) 02/14/2025       Results reviewed, labs MET treatment parameters, ok to proceed with treatment.      Post Infusion Assessment:  Patient tolerated infusion without incident.  Blood return noted pre and post infusion.  Site patent and intact, free from redness, edema or discomfort.  No evidence of extravasations.  Access discontinued per protocol.       Discharge Plan:   Prescription refills given for Oxycodone.  Patient declined prescription refills.  Discharge instructions reviewed with: Patient.  Patient and/or family verbalized understanding of discharge instructions and all questions answered.  AVS to patient via "[employer]".  Patient will return 3/28 for next appointment.   Patient discharged in stable condition accompanied by: self.  Departure Mode: Ambulatory.      GLORIA YANCEY RN

## 2025-02-27 NOTE — TELEPHONE ENCOUNTER
Oncology Nurse Triage - Sickle Cell Pain Crisis:    Situation: Jennifer  calling about Sickle Cell Pain Crisis, requesting to be added on for IV fluids and pain medicine    Background:     Patient's last infusion was 2/25/25  Last clinic visit date:1/31/25 Madelin Falcon   Does patient have active treatment plan? Yes    Assessment of Symptoms:  Onset/Duration of symptoms: 1 day    Is it typical sickle cell pain? Yes  Location: back   Character: throbbing   Intensity: 9/10    Any radiation of pain, numbness, tingling, weakness, warmth, swelling, discoloration of arms or legs?No    Fever? No  (if yes max temperature recorded in last 24 hours):      Chest Pain Present: No    Shortness of breath: No    Other home therapies tried: HEAT/HEATING PAD and WARM BATH     Last home medication taken and when: oxycodone and ibuprofen at 5am     Any Refills Needed?: Yes    Does patient have transportation & length of time to get to clinic: Yes has transportation set up       Recommendations:   Message sent to Patricia Mantilla approved by Patricia Mantilla for IVF/PM   Has sidney appt at 12:30 and wants to add on IVF/PM to appt     If you do not hear from the infusion center by 2pm then you will not be able to get in for an infusion today. If symptoms worsen while waiting for call back, and/or you experience fever, chills, SOB, chest pain, cough, n/v, dizziness, numbness, swelling, discoloration of extremities, then seek emergency evaluation in Emergency Department.     Please note, if you are late for your appt, you risk losing your infusion appt as it may delay another patient's infusion who arrived on time.

## 2025-02-27 NOTE — TELEPHONE ENCOUNTER
Narcotic Refill Request    Medication(s) requested:  oxycodone   Person Requesting Refill:Jennifer   What pain is the medication treating: sickle cell pain   How is the medication being taken?:21 in AM and 1 in pm limits herself to 2 tabs per day   Does pt have enough for today? ALL out   Is pain being adequately controlled on the current regimen?: yes   Experiencing any side effects from medication?: No     Date of most recent appointment:  1/31/25 Vida esquivel   Any No Show Visits:No   Next appointment:   2/27/25 Patricia Mantilla   Last fill date and by whom:  2/19/25 Patricia mantilla    Reviewed: No Access     Send to provider: Patricia Mantilla and Arely Krueger

## 2025-02-27 NOTE — NURSING NOTE
Chief Complaint   Patient presents with    Blood Draw     Labs obtained via noncoring powerport 20 gauge, 3/4 inch needle, heparin flushed, VS taken, checked into next appt     Research kit obtained and sent to lab.

## 2025-02-27 NOTE — Clinical Note
2/27/2025      Jennifer Cervantes  [address]  [facility] [address]      Dear Colleague,    Thank you for referring your patient, Jennifer Cervantes, to the [facility]. Please see a copy of my visit note below.    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 She had multiple thromboembolic events in 2021 despite adherent anticoagulation use (though warfarin was perpetually low) and there are concerns for chronic thromboembolic disease but did not have pulmonary HTN on a November 2021 cath. She is maintained on chronic PO opioids and twice-weekly infusion visits (since 1/24/22) but has been able to be maintained on this regimen and has stayed out of the ED most of the time with even rarer admissions for most of 2023. In 2024, her ED visits have increased somewhat but admissions remain rare.    Interval History:  Jennifer is seen for routine follow-up and sidney today.    Pain has been rather well-controlled lately. Recently had to go to the ED due to ongoing left knee pain. Continue to follow with Dr. Case for workup of this. Started physical therapy which she has found beneficial for suspected meniscus injury. Gait recently has been stable. Reports hurting her knee initially when packing clothing at her house and falling a short distance directly onto the left knee. MRI completed this morning with pending results.    She's been proud of the fact that she's only had 5 ED visits so far this calendar year. She does not desire to change her  oxycodone prescription at this time. She typically takes 1 tablet of oxycodone twice a day but is working toward using this more on a PRN basis.     She continues to see a therapist routinely which she has found very beneficial. She finds herself crying during most appointments when reflecting on challenging events or relationships she's experienced throughout her life. She values the ability to have an outlet and person to safely express her feelings.    Her birthday is next week. She is looking forward to spending time with family and plans to go to the [employer] with her mom.     Sickle Cell Disease Comprehensive Checklist  Bone Health/Avascular Necrosis Screening/Symptoms (each visit): no new concerns today  Leg Ulcer evaluation (every visit): nothing to note  Hypertension (every visit):stable none for several months  Last pulmonary evaluation (asthma, AMAN, pulm HTN): 9/28/22, due for follow-up  Stroke/silent cerebral infarct Hx (Y/N): Yes TIA ~2014, first event ~age 1 with full stroke and R sided weakness  Last PCP Visit: 9/30/24 with Dr. Case  Vaccines:  PCV13: 5/13/19  Pneumovax (PPSV23): 3/04, 10/09, 7/12/19, 10/2024  Menactra: 4/2010, 9/2015 (MCV done 8/16/21)  MenB: 9/16/15, 5/13/19, 1/30/25  Influenza: 10/11/24  Audiology (chelation): done 2/27/24    Comparison to Previous Audiogram dated 6/6/2022:     Pure Tone Thresholds 250-8000 Hz:    RIGHT: stable    LEFT: stable     High Frequency Audiometry 9,000-16,000Hz:     RIGHT: stable    LEFT: stable     Word Recognition Score:    RIGHT: stable    LEFT: stable    Plan last reviewed with patient: 10/2/23    Patient background: 22 yo F, enjoys movies and kids though there are times where she does not really want to talk to people. Does not have a lot of social support at home.     Sickle Cell Disease History  Primary Hematologist Team: Jose Rafael Duncan  PCP: none  Genotype: SS  Acute Pain Crisis Treatment: (limiting IV)   ER   Dilaudid 1 mg IV q1h up to 3  doses  Toradol 30 mg IV x1   Maintenance IV fluids with LR  Other: Zofran 8 mg IV PRN nausea  Inpatient:  PCA Dilaudid 1 hr q30 minutes, no basal rate  Toradol 30 mg x6h x 4 hr  LR maintenance x 1-2 days  Other Medications: Zofran  ASA  Supportive Care: Docusate, Senna  Chronic Pain Medications:    Home regimen: oxycodone 15 mg p.o. q.4-6 hours p.r.n. breakthrough pain.  She also continues on Voltaren gel, and Zoloft among other medications.    -Also benefits from mental health visits, acupuncture  Baseline Hemoglobin: 7 g/dl without chronic transfusions  Hydroxyurea use: Yes  H/O blood transfusions: Yes, several (iron overload) Most recent 11/20/2021  H/O Transfusion Reactions: no  Antibodies:none  H/O Acute Chest Syndrome: Yes  Last episode:9/05/22 (previously 4/26/21, 10/2019)   ICU/intubation: not with 9/2022 admission  H/O Stroke: Yes (managed with chronic transfusions in the past, stopped late Spring 2020)  H/O VTE: Yes (2/2021)  H/O Cholecystectomy or Splenectomy: no  H/O Asthma, Pulm HTN, AVN, Leg Ulcers, Nephropathy, Retinopathy, etc: Iron overload, asthma, chronic lung disease, physical limitations from early stroke    ---------------------------------------  Jennifer Cervantes's Goals (discussed 1/30/25)    1-3 month goal:  Continue weekly therapy    6 month goal:      12 month goal:      Disease-specific goal(s):  Continue to decrease ED visits        Current Outpatient Medications   Medication Sig Dispense Refill    albuterol (PROVENTIL) (2.5 MG/3ML) 0.083% neb solution Take 2 vials (5 mg) by nebulization every 6 hours as needed for shortness of breath or wheezing. 90 mL 3    aspirin (ASA) 81 MG chewable tablet Take 1 tablet (81 mg) by mouth 2 times daily 60 tablet 11    budesonide-formoterol (SYMBICORT/BREYNA) 160-4.5 MCG/ACT Inhaler Inhale 2 puffs twice daily plus 1-2 puffs as needed. May use up to 12 puffs per day. 20.4 g 11    deferasirox (JADENU) 360 MG tablet Take 4 tablets (1,440 mg) by mouth daily.  120 tablet 11    Deferiprone, Twice Daily, 1000 MG TABS Take 2,000 mg by mouth 2 times daily. 150 tablet 3    diphenhydrAMINE (BENADRYL) 50 MG capsule Administer 12  hours pre - IV contrast injection and 2 hours prior to contrast. Patient must have a . 2 capsule 0    EPINEPHrine (ANY BX GENERIC EQUIV) 0.3 MG/0.3ML injection 2-pack Inject 0.3 mLs (0.3 mg) into the muscle as needed for anaphylaxis. 1 each 1    gabapentin (NEURONTIN) 300 MG capsule Take 1 capsule (300 mg) by mouth 3 times daily. Take PO 1 pill in evening (300 mg) TID to start. If tolerated, increase by 300 mg in morning or lunch every few days, updating your doctor's office in time to get refills. The maximum dose is 900 mg TID. (Patient taking differently: Take 600 mg by mouth at bedtime. Take PO 1 pill in evening (300 mg) TID to start. If tolerated, increase by 300 mg in morning or lunch every few days, updating your doctor's office in time to get refills. The maximum dose is 900 mg TID.) 90 capsule 1    hydroxyurea (HYDREA) 500 MG capsule Take 6 capsules (3,000 mg) by mouth daily 180 capsule 11    ibuprofen (ADVIL/MOTRIN) 800 MG tablet Take 800 mg by mouth every 8 hours as needed for moderate pain      methocarbamol (ROBAXIN) 750 MG tablet Take 1 tablet (750 mg) by mouth 4 times daily as needed for muscle spasms (during sickle pain crises. Okay to take scheduled while in pain). 60 tablet 1    methylPREDNISolone (MEDROL) 32 MG tablet Take 1 tab 12 hours before appointment and 1 tab 2 hours prior to the procedure with IV contrast. 2 tablet 0    naloxone (NARCAN) 4 MG/0.1ML nasal spray Spray 4 mg into one nostril alternating nostrils as needed for opioid reversal. every 2-3 minutes until assistance arrives 0.2 mL 0    oxyCODONE IR (ROXICODONE) 10 MG tablet Take 1 tablet (10 mg) by mouth every 6 hours as needed for severe pain (Goal of less than 4 per day). 12 tablet 0     Past Medical History  Past Medical History:   Diagnosis Date    Anxiety      Bleeding disorder     Blood clotting disorder     Cerebral infarction (H) 2015    Cognitive developmental delay     low IQ. 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 side (H)     right hand contractures    Iron overload due to repeated red blood cell transfusions     Migraines     Multiple subsegmental pulmonary emboli without acute cor pulmonale (H) 02/01/2021    Oppositional defiant behavior     Presence of intrauterine contraceptive device 2/18/2020    Superficial venous thrombosis of arm, right 03/25/2021    Uncomplicated asthma      Past Surgical History:   Procedure Laterality Date    AS INSERT TUNNELED CV 2 CATH W/O PORT/PUMP      CHOLECYSTECTOMY      CV RIGHT HEART CATH MEASUREMENTS RECORDED N/A 11/18/2021    Procedure: Right Heart Cath;  Surgeon: Jackson Stauffer MD;  Location:  HEART CARDIAC CATH LAB    INSERT PORT VASCULAR ACCESS Left 4/21/2021    Procedure: INSERTION, VASCULAR ACCESS PORT (NOT SURE ON SIDE UNTIL REMOVAL);  Surgeon: Rajan More MD;  Location: [facility] OR    IR CHEST PORT PLACEMENT > 5 YRS OF AGE  4/21/2021    IR CVC NON TUNNEL LINE REMOVAL  5/6/2021    IR CVC NON TUNNEL PLACEMENT > 5 YRS  04/07/2020    IR CVC NON TUNNEL PLACEMENT > 5 YRS  4/30/2021    IR CVC NON TUNNEL PLACEMENT > 5 YRS  9/7/2022    IR PORT REMOVAL LEFT  4/21/2021    REMOVE PORT VASCULAR ACCESS Left 4/21/2021    Procedure: REMOVAL, VASCULAR ACCESS PORT LEFT;  Surgeon: Rajan More MD;  Location: [facility] OR    REPAIR TENDON ELBOW Right 10/02/2019    Procedure: Right Elbow Flexor Lengthening, Flexor Pronator Slide Of Wrist and Finger, Thumb Adductor Lengthening;  Surgeon: Anai Franco MD;  Location: [facility] OR    TONSILLECTOMY Bilateral 10/02/2019    Procedure: Bilateral Tonsillectomy;  Surgeon: Farhana Guy MD;  Location: [facility] OR    ZC BREAST REDUCTION (INCLUDES LIPO) TIER 3  Bilateral 04/23/2019     Allergies   Allergen Reactions    Contrast Dye Angioedema     Hives and breathing issues    Fish-Derived Products Hives    Seafood Hives    Adhesive Tape Hives     Primipore dressing causes hives    Gadolinium     Iodinated Contrast Media      Social History   Social History     Tobacco Use    Smoking status: Never     Passive exposure: Never    Smokeless tobacco: Never   Substance Use Topics    Alcohol use: Not Currently     Alcohol/week: 0.0 standard drinks of alcohol    Drug use: Never   Past medical history and social history were reviewed.    Physical Examination:  /61   Pulse 105   Temp 98.2  F (36.8  C) (Oral)   Resp 18   Wt 72.6 kg (160 lb 1.6 oz)   SpO2 98%   BMI 27.48 kg/m       Wt Readings from Last 10 Encounters:   02/27/25 72.6 kg (160 lb 1.6 oz)   02/23/25 72.1 kg (159 lb)   02/14/25 72.4 kg (159 lb 11.2 oz)   02/04/25 73.5 kg (162 lb 1.6 oz)   01/31/25 74.4 kg (164 lb)   01/31/25 74.5 kg (164 lb 3.2 oz)   01/30/25 73.7 kg (162 lb 8 oz)   01/24/25 71 kg (156 lb 8 oz)   01/17/25 71.2 kg (157 lb)   01/02/25 71.6 kg (157 lb 12.8 oz)     General: Pleasant female, NAD  Eyes: EOMI, PERRL  ENT: oral mucosa moist, pink  Respiratory: normal respiratory effort on room air  Ext: no peripheral edema  Neurologic: chronic contracture of right arm and wrist. Steady gait. A/O x 4.  Skin: No rashes, petechiae, or bruising noted on exposed skin.   Laboratory Data:  Recent Results (from the past 240 hours)   HCG qualitative pregnancy (blood)    Collection Time: 02/23/25  5:14 AM   Result Value Ref Range    hCG Serum Qualitative Negative Negative   CBC with platelets and differential    Collection Time: 02/23/25  5:14 AM   Result Value Ref Range    WBC Count 12.1 (H) 4.0 - 11.0 10e3/uL    RBC Count 2.40 (L) 3.80 - 5.20 10e6/uL    Hemoglobin 7.3 (L) 11.7 - 15.7 g/dL    Hematocrit 20.6 (L) 35.0 - 47.0 %    MCV 86 78 - 100 fL    MCH 30.4 26.5 - 33.0 pg    MCHC 35.4 31.5 - 36.5 g/dL    RDW  21.2 (H) 10.0 - 15.0 %    Platelet Count 480 (H) 150 - 450 10e3/uL    % Neutrophils 63 %    % Lymphocytes 24 %    % Monocytes 7 %    % Eosinophils 4 %    % Basophils 2 %    % Immature Granulocytes 0 %    NRBCs per 100 WBC 1 (H) <1 /100    Absolute Neutrophils 7.6 1.6 - 8.3 10e3/uL    Absolute Lymphocytes 2.9 0.8 - 5.3 10e3/uL    Absolute Monocytes 0.9 0.0 - 1.3 10e3/uL    Absolute Eosinophils 0.4 0.0 - 0.7 10e3/uL    Absolute Basophils 0.3 (H) 0.0 - 0.2 10e3/uL    Absolute Immature Granulocytes 0.1 <=0.4 10e3/uL    Absolute NRBCs 0.1 10e3/uL   Basic metabolic panel    Collection Time: 02/23/25  7:25 AM   Result Value Ref Range    Sodium 138 135 - 145 mmol/L    Potassium 4.0 3.4 - 5.3 mmol/L    Chloride 104 98 - 107 mmol/L    Carbon Dioxide (CO2) 23 22 - 29 mmol/L    Anion Gap 11 7 - 15 mmol/L    Urea Nitrogen 6.4 6.0 - 20.0 mg/dL    Creatinine 0.50 (L) 0.51 - 0.95 mg/dL    GFR Estimate >90 >60 mL/min/1.73m2    Calcium 8.6 (L) 8.8 - 10.4 mg/dL    Glucose 94 70 - 99 mg/dL   Basic metabolic panel    Collection Time: 02/27/25 11:29 AM   Result Value Ref Range    Sodium 140 135 - 145 mmol/L    Potassium 3.4 3.4 - 5.3 mmol/L    Chloride 106 98 - 107 mmol/L    Carbon Dioxide (CO2) 22 22 - 29 mmol/L    Anion Gap 12 7 - 15 mmol/L    Urea Nitrogen 3.9 (L) 6.0 - 20.0 mg/dL    Creatinine 0.55 0.51 - 0.95 mg/dL    GFR Estimate >90 >60 mL/min/1.73m2    Calcium 9.0 8.8 - 10.4 mg/dL    Glucose 98 70 - 99 mg/dL   Reticulocyte count    Collection Time: 02/27/25 11:29 AM   Result Value Ref Range    % Reticulocyte 27.6 (H) 0.5 - 2.0 %    Absolute Reticulocyte 0.594 (H) 0.025 - 0.095 10e6/uL   CBC with platelets and differential    Collection Time: 02/27/25 11:29 AM   Result Value Ref Range    WBC Count 13.7 (H) 4.0 - 11.0 10e3/uL    RBC Count 2.34 (L) 3.80 - 5.20 10e6/uL    Hemoglobin 7.0 (L) 11.7 - 15.7 g/dL    Hematocrit 20.3 (L) 35.0 - 47.0 %    MCV 87 78 - 100 fL    MCH 29.9 26.5 - 33.0 pg    MCHC 34.5 31.5 - 36.5 g/dL    RDW 25.0  (H) 10.0 - 15.0 %    Platelet Count 483 (H) 150 - 450 10e3/uL   Manual Differential    Collection Time: 02/27/25 11:29 AM   Result Value Ref Range    % Neutrophils 60 %    % Lymphocytes 28 %    % Monocytes 7 %    % Eosinophils 3 %    % Basophils 3 %    NRBCs per 100 WBC 7 (H) <=0 %    Absolute Neutrophils 8.3 1.6 - 8.3 10e3/uL    Absolute Lymphocytes 3.8 0.8 - 5.3 10e3/uL    Absolute Monocytes 0.9 0.0 - 1.3 10e3/uL    Absolute Eosinophils 0.3 0.0 - 0.7 10e3/uL    Absolute Basophils 0.3 (H) 0.0 - 0.2 10e3/uL    Absolute NRBCs 0.9 (H) <=0.0 10e3/uL    RBC Morphology Confirmed RBC Indices     Platelet Assessment  Automated Count Confirmed. Platelet morphology is normal.     Automated Count Confirmed. Platelet morphology is normal.    Polychromasia Moderate (A) None Seen    Sickle Cells Moderate (A) None Seen     I reviewed the above labs today.    Assessment and Plan:  1. Sickle Cell HgbSS Disease  2. Acute pain crises  3. Chronic Pain  4. 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 History of CVA  7. Hearing loss  8. Nausea/vomiting     Jennifer was seen for routine sidney infusion and follow-up today. She has made an ongoing effort to significantly reduce her ED visits this year with only 5 so far in 2 months. She continue to use oxycodone rather regularly at home, although only 2 tablets a day on average. I again encouraged her to focus on taking her medications on an as-needed basis which would likely require her to go days without any oxycodone.  We will continue her current home oxycodone prescriptions of 12 tablets per week which has remained stable for the past 3 months.     A Ferriscan was performed 1/30/25 showing a decrease in her average iron concentration, now 24 mg/g dry tissue, previously 31.8. Following discussion with Dr. Duncan we are not going to adjust her chelation at this time and will repeat a Ferriscan in 6 months (June 2025). She remains on Jadenu  1440 mg daily and deferiprone 2000 mg BID.    I am very pleased with her dedication to therapy over the past two months. She has found this extremely beneficial in managing interpersonal relationships with family members. Health maintenance reviewed today and up-to-date.    We will continue to see her monthly for ANDREAS/MD visits with her sidney infusions.       -------------------------------------  Plan:  -Continue Hydrea to 3000mg daily to help lessen frequency of sickle cell pain.   -Continue monthly crizanlizumab infusions  -Continue to reassess plan for home oxycodone use monthly.  Continue prescriptions for 12 tablets oxycodone per week.  We can continue to decrease weekly if she desires.  We are aiming to avoid any dose or quantity escalations.   -Continue infusion center visits limited to two times per week (Mondays and Fridays ideally although still needs to call to request). She can self-reduce infusion days/week. Continue diligent home management with current medications, heat, rest, compression, warm baths.   -If unable to manage at home can go to ED  with continued plan to use IV Dilaudid and take a break from ketamine (10/2/23). No PCA use and goal for any admission would still be to discharge by 5 days or less  -Hold plan for EGD for evaluation of intermittent n/v, abdominal pain as she has missed multiple scheduled procedures d/t limitations with transportation  -Continue metoclopramide 5 mg TID PRN if this continues to be helpful for nausea/vomiting-denies recent GI concerns  - Continue Jadenu for iron overload and deferiprone. Next Ferriscan due June 2025  -Continue aspirin BID  -RTC with Dr. Duncan 3/17 as planned    Patricia Mantilla CNP  ---  44 minutes spent on the date of the encounter doing chart review, review of test results, interpretation of tests, patient visit, and documentation.    The longitudinal plan of care for the diagnosis(es)/condition(s) as documented were addressed during this visit.  Due to the added complexity in care, I will continue to support Jennifer in the subsequent management and with ongoing continuity of care.                    Again, thank you for allowing me to participate in the care of your patient.        Sincerely,        Patricia Mantilla CNP    Electronically signed

## 2025-03-01 ENCOUNTER — HOSPITAL ENCOUNTER (EMERGENCY)
Facility: CLINIC | Age: 25
Discharge: HOME | End: 2025-03-01
Attending: EMERGENCY MEDICINE
Payer: COMMERCIAL

## 2025-03-01 ENCOUNTER — APPOINTMENT (OUTPATIENT)
Dept: CT IMAGING | Facility: CLINIC | Age: 25
End: 2025-03-01
Attending: EMERGENCY MEDICINE
Payer: COMMERCIAL

## 2025-03-01 VITALS
SYSTOLIC BLOOD PRESSURE: 136 MMHG | TEMPERATURE: 98.9 F | OXYGEN SATURATION: 97 % | HEART RATE: 105 BPM | DIASTOLIC BLOOD PRESSURE: 63 MMHG | RESPIRATION RATE: 18 BRPM

## 2025-03-01 DIAGNOSIS — D57.00 SICKLE CELL DISEASE WITH CRISIS (H): ICD-10-CM

## 2025-03-01 DIAGNOSIS — J45.21 MILD INTERMITTENT ASTHMA WITH EXACERBATION: ICD-10-CM

## 2025-03-01 LAB
ALBUMIN SERPL BCG-MCNC: 4.7 G/DL (ref 3.5–5.2)
ALBUMIN UR-MCNC: NEGATIVE MG/DL
ALP SERPL-CCNC: 62 U/L (ref 40–150)
ALT SERPL W P-5'-P-CCNC: 25 U/L (ref 0–50)
AMORPH CRY #/AREA URNS HPF: ABNORMAL /HPF
ANION GAP SERPL CALCULATED.3IONS-SCNC: 12 MMOL/L (ref 7–15)
APPEARANCE UR: CLEAR
AST SERPL W P-5'-P-CCNC: 43 U/L (ref 0–45)
BASOPHILS # BLD AUTO: 0.2 10E3/UL (ref 0–0.2)
BASOPHILS NFR BLD AUTO: 1 %
BILIRUB SERPL-MCNC: 2.9 MG/DL
BILIRUB UR QL STRIP: NEGATIVE
BUN SERPL-MCNC: 8.8 MG/DL (ref 6–20)
CALCIUM SERPL-MCNC: 9 MG/DL (ref 8.8–10.4)
CHLORIDE SERPL-SCNC: 104 MMOL/L (ref 98–107)
COLOR UR AUTO: ABNORMAL
CREAT SERPL-MCNC: 0.56 MG/DL (ref 0.51–0.95)
EGFRCR SERPLBLD CKD-EPI 2021: >90 ML/MIN/1.73M2
EOSINOPHIL # BLD AUTO: 0.5 10E3/UL (ref 0–0.7)
EOSINOPHIL NFR BLD AUTO: 3 %
ERYTHROCYTE [DISTWIDTH] IN BLOOD BY AUTOMATED COUNT: 24.2 % (ref 10–15)
GLUCOSE SERPL-MCNC: 88 MG/DL (ref 70–99)
GLUCOSE UR STRIP-MCNC: NEGATIVE MG/DL
HCG UR QL: NEGATIVE
HCO3 SERPL-SCNC: 22 MMOL/L (ref 22–29)
HCT VFR BLD AUTO: 22.1 % (ref 35–47)
HGB BLD-MCNC: 7.6 G/DL (ref 11.7–15.7)
HGB UR QL STRIP: NEGATIVE
IMM GRANULOCYTES # BLD: 0.1 10E3/UL
IMM GRANULOCYTES NFR BLD: 1 %
INTERNAL QC OK POCT: NORMAL
KETONES UR STRIP-MCNC: NEGATIVE MG/DL
LEUKOCYTE ESTERASE UR QL STRIP: NEGATIVE
LYMPHOCYTES # BLD AUTO: 2.2 10E3/UL (ref 0.8–5.3)
LYMPHOCYTES NFR BLD AUTO: 15 %
MCH RBC QN AUTO: 31.1 PG (ref 26.5–33)
MCHC RBC AUTO-ENTMCNC: 34.4 G/DL (ref 31.5–36.5)
MCV RBC AUTO: 91 FL (ref 78–100)
MONOCYTES # BLD AUTO: 0.9 10E3/UL (ref 0–1.3)
MONOCYTES NFR BLD AUTO: 6 %
MUCOUS THREADS #/AREA URNS LPF: PRESENT /LPF
NEUTROPHILS # BLD AUTO: 10.8 10E3/UL (ref 1.6–8.3)
NEUTROPHILS NFR BLD AUTO: 74 %
NITRATE UR QL: NEGATIVE
NRBC # BLD AUTO: 0.7 10E3/UL
NRBC BLD AUTO-RTO: 5 /100
PH UR STRIP: 6.5 [PH] (ref 5–7)
PLATELET # BLD AUTO: 503 10E3/UL (ref 150–450)
POCT KIT EXPIRATION DATE: NORMAL
POCT KIT LOT NUMBER: NORMAL
POTASSIUM SERPL-SCNC: 3.8 MMOL/L (ref 3.4–5.3)
PROT SERPL-MCNC: 8 G/DL (ref 6.4–8.3)
RBC # BLD AUTO: 2.44 10E6/UL (ref 3.8–5.2)
RBC URINE: 0 /HPF
RETICS # AUTO: 0.68 10E6/UL (ref 0.03–0.1)
RETICS/RBC NFR AUTO: 26.6 % (ref 0.5–2)
SODIUM SERPL-SCNC: 138 MMOL/L (ref 135–145)
SP GR UR STRIP: 1.01 (ref 1–1.03)
SQUAMOUS EPITHELIAL: <1 /HPF
UROBILINOGEN UR STRIP-MCNC: 3 MG/DL
WBC # BLD AUTO: 14.7 10E3/UL (ref 4–11)
WBC URINE: 1 /HPF

## 2025-03-01 PROCEDURE — 99284 EMERGENCY DEPT VISIT MOD MDM: CPT | Performed by: EMERGENCY MEDICINE

## 2025-03-01 PROCEDURE — 96361 HYDRATE IV INFUSION ADD-ON: CPT | Performed by: EMERGENCY MEDICINE

## 2025-03-01 PROCEDURE — 36415 COLL VENOUS BLD VENIPUNCTURE: CPT | Performed by: EMERGENCY MEDICINE

## 2025-03-01 PROCEDURE — 71250 CT THORAX DX C-: CPT

## 2025-03-01 PROCEDURE — 250N000009 HC RX 250: Performed by: EMERGENCY MEDICINE

## 2025-03-01 PROCEDURE — 94640 AIRWAY INHALATION TREATMENT: CPT | Performed by: EMERGENCY MEDICINE

## 2025-03-01 PROCEDURE — 99285 EMERGENCY DEPT VISIT HI MDM: CPT | Mod: 25 | Performed by: EMERGENCY MEDICINE

## 2025-03-01 PROCEDURE — 96374 THER/PROPH/DIAG INJ IV PUSH: CPT | Performed by: EMERGENCY MEDICINE

## 2025-03-01 PROCEDURE — 82040 ASSAY OF SERUM ALBUMIN: CPT | Performed by: EMERGENCY MEDICINE

## 2025-03-01 PROCEDURE — 81001 URINALYSIS AUTO W/SCOPE: CPT | Performed by: EMERGENCY MEDICINE

## 2025-03-01 PROCEDURE — 85004 AUTOMATED DIFF WBC COUNT: CPT | Performed by: EMERGENCY MEDICINE

## 2025-03-01 PROCEDURE — 250N000011 HC RX IP 250 OP 636: Performed by: EMERGENCY MEDICINE

## 2025-03-01 PROCEDURE — 258N000003 HC RX IP 258 OP 636: Performed by: EMERGENCY MEDICINE

## 2025-03-01 PROCEDURE — 96375 TX/PRO/DX INJ NEW DRUG ADDON: CPT | Performed by: EMERGENCY MEDICINE

## 2025-03-01 PROCEDURE — 81025 URINE PREGNANCY TEST: CPT | Performed by: EMERGENCY MEDICINE

## 2025-03-01 PROCEDURE — 96376 TX/PRO/DX INJ SAME DRUG ADON: CPT | Performed by: EMERGENCY MEDICINE

## 2025-03-01 PROCEDURE — 85045 AUTOMATED RETICULOCYTE COUNT: CPT | Performed by: EMERGENCY MEDICINE

## 2025-03-01 RX ORDER — ONDANSETRON 2 MG/ML
8 INJECTION INTRAMUSCULAR; INTRAVENOUS ONCE
Status: COMPLETED | OUTPATIENT
Start: 2025-03-01 | End: 2025-03-01

## 2025-03-01 RX ORDER — OXYCODONE HYDROCHLORIDE 5 MG/1
5 TABLET ORAL EVERY 6 HOURS PRN
Qty: 12 TABLET | Refills: 0 | Status: SHIPPED | OUTPATIENT
Start: 2025-03-01 | End: 2025-03-05

## 2025-03-01 RX ORDER — ALBUTEROL SULFATE 0.83 MG/ML
2.5 SOLUTION RESPIRATORY (INHALATION) EVERY 6 HOURS PRN
Qty: 90 ML | Refills: 0 | Status: SHIPPED | OUTPATIENT
Start: 2025-03-01

## 2025-03-01 RX ORDER — IPRATROPIUM BROMIDE AND ALBUTEROL SULFATE 2.5; .5 MG/3ML; MG/3ML
3 SOLUTION RESPIRATORY (INHALATION)
Status: DISCONTINUED | OUTPATIENT
Start: 2025-03-02 | End: 2025-03-02 | Stop reason: HOSPADM

## 2025-03-01 RX ORDER — HEPARIN SODIUM (PORCINE) LOCK FLUSH IV SOLN 100 UNIT/ML 100 UNIT/ML
100 SOLUTION INTRAVENOUS ONCE
Status: COMPLETED | OUTPATIENT
Start: 2025-03-01 | End: 2025-03-01

## 2025-03-01 RX ORDER — IPRATROPIUM BROMIDE AND ALBUTEROL SULFATE 2.5; .5 MG/3ML; MG/3ML
3 SOLUTION RESPIRATORY (INHALATION) ONCE
Status: COMPLETED | OUTPATIENT
Start: 2025-03-01 | End: 2025-03-01

## 2025-03-01 RX ORDER — KETOROLAC TROMETHAMINE 30 MG/ML
30 INJECTION, SOLUTION INTRAMUSCULAR; INTRAVENOUS EVERY 6 HOURS PRN
Status: DISCONTINUED | OUTPATIENT
Start: 2025-03-01 | End: 2025-03-02 | Stop reason: HOSPADM

## 2025-03-01 RX ADMIN — HYDROMORPHONE HYDROCHLORIDE 1 MG: 1 INJECTION, SOLUTION INTRAMUSCULAR; INTRAVENOUS; SUBCUTANEOUS at 19:56

## 2025-03-01 RX ADMIN — SODIUM CHLORIDE, POTASSIUM CHLORIDE, SODIUM LACTATE AND CALCIUM CHLORIDE 1000 ML: 600; 310; 30; 20 INJECTION, SOLUTION INTRAVENOUS at 19:55

## 2025-03-01 RX ADMIN — HYDROMORPHONE HYDROCHLORIDE 1 MG: 1 INJECTION, SOLUTION INTRAMUSCULAR; INTRAVENOUS; SUBCUTANEOUS at 21:57

## 2025-03-01 RX ADMIN — HEPARIN SODIUM (PORCINE) LOCK FLUSH IV SOLN 100 UNIT/ML 100 UNITS: 100 SOLUTION at 23:19

## 2025-03-01 RX ADMIN — HYDROMORPHONE HYDROCHLORIDE 1 MG: 1 INJECTION, SOLUTION INTRAMUSCULAR; INTRAVENOUS; SUBCUTANEOUS at 20:53

## 2025-03-01 RX ADMIN — KETOROLAC TROMETHAMINE 30 MG: 30 INJECTION, SOLUTION INTRAMUSCULAR at 20:53

## 2025-03-01 RX ADMIN — ONDANSETRON 8 MG: 2 INJECTION INTRAMUSCULAR; INTRAVENOUS at 19:56

## 2025-03-01 RX ADMIN — IPRATROPIUM BROMIDE AND ALBUTEROL SULFATE 3 ML: .5; 3 SOLUTION RESPIRATORY (INHALATION) at 23:06

## 2025-03-01 ASSESSMENT — ACTIVITIES OF DAILY LIVING (ADL)
ADLS_ACUITY_SCORE: 58

## 2025-03-02 LAB
ATRIAL RATE - MUSE: 71 BPM
DIASTOLIC BLOOD PRESSURE - MUSE: NORMAL MMHG
INTERPRETATION ECG - MUSE: NORMAL
P AXIS - MUSE: 71 DEGREES
PR INTERVAL - MUSE: 156 MS
QRS DURATION - MUSE: 84 MS
QT - MUSE: 412 MS
QTC - MUSE: 447 MS
R AXIS - MUSE: 40 DEGREES
SYSTOLIC BLOOD PRESSURE - MUSE: NORMAL MMHG
T AXIS - MUSE: 23 DEGREES
VENTRICULAR RATE- MUSE: 71 BPM

## 2025-03-02 NOTE — ED PROVIDER NOTES
[facility] [place] [facility] ([facility])    3/01/25      ED PROVIDER NOTE    History     Chief Complaint   Patient presents with    Sickle Cell Pain Crisis     The history is provided by the patient and medical records.     Jennifer Cervantes is a 24 year old female with sickle cell disease who presents to the ED with chest pain.  Patient has care plan in place.  She reports an extensive history of multiple prior PEs and ACS.  No shortness of breath.  No leg pain or history of DVT.  She additionally has a history notable for stroke leading to significant cognitive delays and right upper extremity hemiparesis, iron overload 2/2 chronic transfusions, urinary incontinence, and asthma. She is currently on Hydrea and Jadenu.     Past Medical History  Past Medical History:   Diagnosis Date    Anxiety     Bleeding disorder     Blood clotting disorder     Cerebral infarction (H) 2015    Cognitive developmental delay     low IQ. 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 side (H)     right hand contractures    Iron overload due to repeated red blood cell transfusions     Migraines     Multiple subsegmental pulmonary emboli without acute cor pulmonale (H) 02/01/2021    Oppositional defiant behavior     Presence of intrauterine contraceptive device 2/18/2020    Superficial venous thrombosis of arm, right 03/25/2021    Uncomplicated asthma      Past Surgical History:   Procedure Laterality Date    AS INSERT TUNNELED CV 2 CATH W/O PORT/PUMP      CHOLECYSTECTOMY      CV RIGHT HEART CATH MEASUREMENTS RECORDED N/A 11/18/2021    Procedure: Right Heart Cath;  Surgeon: Jackson Stauffer MD;  Location:  HEART CARDIAC CATH LAB    INSERT PORT VASCULAR ACCESS Left 4/21/2021    Procedure: INSERTION, VASCULAR ACCESS PORT (NOT SURE ON SIDE UNTIL REMOVAL);  Surgeon: Rajan More MD;  Location: [facility] OR      CHEST PORT PLACEMENT > 5 YRS OF AGE  4/21/2021    IR CVC NON TUNNEL LINE REMOVAL  5/6/2021    IR CVC NON TUNNEL PLACEMENT > 5 YRS  04/07/2020    IR CVC NON TUNNEL PLACEMENT > 5 YRS  4/30/2021    IR CVC NON TUNNEL PLACEMENT > 5 YRS  9/7/2022    IR PORT REMOVAL LEFT  4/21/2021    REMOVE PORT VASCULAR ACCESS Left 4/21/2021    Procedure: REMOVAL, VASCULAR ACCESS PORT LEFT;  Surgeon: Rajan More MD;  Location: [facility] OR    REPAIR TENDON ELBOW Right 10/02/2019    Procedure: Right Elbow Flexor Lengthening, Flexor Pronator Slide Of Wrist and Finger, Thumb Adductor Lengthening;  Surgeon: Anai Franco MD;  Location: [facility] OR    TONSILLECTOMY Bilateral 10/02/2019    Procedure: Bilateral Tonsillectomy;  Surgeon: Farhana Guy MD;  Location: [facility] OR    ZZC BREAST REDUCTION (INCLUDES LIPO) TIER 3 Bilateral 04/23/2019     albuterol (PROVENTIL) (2.5 MG/3ML) 0.083% neb solution  aspirin (ASA) 81 MG chewable tablet  budesonide-formoterol (SYMBICORT/BREYNA) 160-4.5 MCG/ACT Inhaler  deferasirox (JADENU) 360 MG tablet  Deferiprone, Twice Daily, 1000 MG TABS  diphenhydrAMINE (BENADRYL) 50 MG capsule  EPINEPHrine (ANY BX GENERIC EQUIV) 0.3 MG/0.3ML injection 2-pack  gabapentin (NEURONTIN) 300 MG capsule  hydroxyurea (HYDREA) 500 MG capsule  ibuprofen (ADVIL/MOTRIN) 800 MG tablet  methocarbamol (ROBAXIN) 750 MG tablet  methylPREDNISolone (MEDROL) 32 MG tablet  naloxone (NARCAN) 4 MG/0.1ML nasal spray  oxyCODONE IR (ROXICODONE) 10 MG tablet      Allergies   Allergen Reactions    Contrast Dye Angioedema     Hives and breathing issues    Fish-Derived Products Hives    Seafood Hives    Adhesive Tape Hives     Primipore dressing causes hives    Gadolinium     Iodinated Contrast Media      Family History  Family History   Problem Relation Age of Onset    Sickle Cell Trait Mother     Hypertension Mother     Asthma Mother     Sickle Cell Trait Father     Glaucoma No family hx of     Macular Degeneration No family hx of      "Diabetes No family hx of     Gout No family hx of      Social History   Social History     Tobacco Use    Smoking status: Never     Passive exposure: Never    Smokeless tobacco: Never   Substance Use Topics    Alcohol use: Not Currently     Alcohol/week: 0.0 standard drinks of alcohol    Drug use: Never      Past medical history, past surgical history, medications, allergies, family history, and social history were reviewed with the patient. No additional pertinent items.     A complete review of systems was performed with pertinent positives and negatives noted in the HPI, and all other systems negative.    Physical Exam   BP: (!) 144/84  Pulse: 105  Temp: 98.9  F (37.2  C)  Resp: 18  SpO2: 97 %    Physical Exam  Vitals and nursing note reviewed.   Constitutional:       General: She is not in acute distress.     Appearance: Normal appearance. She is not diaphoretic.   HENT:      Head: Atraumatic.      Mouth/Throat:      Mouth: Mucous membranes are moist.   Eyes:      General: No scleral icterus.     Conjunctiva/sclera: Conjunctivae normal.   Cardiovascular:      Rate and Rhythm: Normal rate.      Heart sounds: Normal heart sounds.   Pulmonary:      Effort: No respiratory distress.      Breath sounds: Normal breath sounds.   Abdominal:      General: Abdomen is flat.   Musculoskeletal:      Cervical back: Neck supple.   Skin:     General: Skin is warm.      Findings: No rash.   Neurological:      Mental Status: She is alert.       ED Course, Procedures, & Data      Procedures       {ED Course Selections (Optional):465040}  {ED Sepsis CMS Documentation (Optional):197769::\" \"}       No results found for any visits on 03/01/25.  Medications - No data to display  Labs Ordered and Resulted from Time of ED Arrival to Time of ED Departure - No data to display  No orders to display          {Critical Care Performed?:722588}    Assessment & Plan    ***    I have reviewed the nursing notes. I have reviewed the findings, " diagnosis, plan and need for follow up with the patient.    New Prescriptions    No medications on file       Final diagnoses:   None     I, Philip Diamond, am serving as a trained medical scribe to document services personally performed by Jitendra Perry MD based on the provider's statements to me on March 1, 2025.  This document has been checked and approved by the attending provider.    IJitendra MD, was physically present and have reviewed and verified the accuracy of this note documented by Philip Diamond medical scribe.      Jitendra Perry MD  [facility] EMERGENCY DEPARTMENT  3/1/2025

## 2025-03-02 NOTE — ED NOTES
Pt discharged to home by self. Instructions and education for home and prescription home medications provided, pt stated understanding. All questions and concerns addressed prior to discharge.

## 2025-03-03 ENCOUNTER — PATIENT OUTREACH (OUTPATIENT)
Dept: CARE COORDINATION | Facility: CLINIC | Age: 25
End: 2025-03-03
Payer: COMMERCIAL

## 2025-03-03 ENCOUNTER — NURSE TRIAGE (OUTPATIENT)
Dept: ONCOLOGY | Facility: CLINIC | Age: 25
End: 2025-03-03
Payer: COMMERCIAL

## 2025-03-03 ENCOUNTER — INFUSION THERAPY VISIT (OUTPATIENT)
Dept: ONCOLOGY | Facility: CLINIC | Age: 25
End: 2025-03-03
Payer: COMMERCIAL

## 2025-03-03 VITALS
SYSTOLIC BLOOD PRESSURE: 112 MMHG | OXYGEN SATURATION: 92 % | HEART RATE: 88 BPM | RESPIRATION RATE: 16 BRPM | TEMPERATURE: 97.5 F | DIASTOLIC BLOOD PRESSURE: 77 MMHG

## 2025-03-03 DIAGNOSIS — D57.00 SICKLE CELL PAIN CRISIS (H): Primary | ICD-10-CM

## 2025-03-03 DIAGNOSIS — G81.10 SPASTIC HEMIPLEGIA, UNSPECIFIED ETIOLOGY, UNSPECIFIED LATERALITY (H): ICD-10-CM

## 2025-03-03 PROCEDURE — 258N000003 HC RX IP 258 OP 636: Performed by: PEDIATRICS

## 2025-03-03 PROCEDURE — 96376 TX/PRO/DX INJ SAME DRUG ADON: CPT

## 2025-03-03 PROCEDURE — 96375 TX/PRO/DX INJ NEW DRUG ADDON: CPT

## 2025-03-03 PROCEDURE — 96374 THER/PROPH/DIAG INJ IV PUSH: CPT

## 2025-03-03 PROCEDURE — 96361 HYDRATE IV INFUSION ADD-ON: CPT

## 2025-03-03 PROCEDURE — 250N000011 HC RX IP 250 OP 636: Mod: JZ | Performed by: PEDIATRICS

## 2025-03-03 PROCEDURE — 250N000013 HC RX MED GY IP 250 OP 250 PS 637: Performed by: PEDIATRICS

## 2025-03-03 RX ORDER — HEPARIN SODIUM (PORCINE) LOCK FLUSH IV SOLN 100 UNIT/ML 100 UNIT/ML
5 SOLUTION INTRAVENOUS
Status: CANCELLED | OUTPATIENT
Start: 2025-04-01

## 2025-03-03 RX ORDER — KETOROLAC TROMETHAMINE 30 MG/ML
30 INJECTION, SOLUTION INTRAMUSCULAR; INTRAVENOUS ONCE
Status: COMPLETED | OUTPATIENT
Start: 2025-03-03 | End: 2025-03-03

## 2025-03-03 RX ORDER — DIPHENHYDRAMINE HCL 25 MG
50 CAPSULE ORAL
Status: CANCELLED
Start: 2025-04-01

## 2025-03-03 RX ORDER — ONDANSETRON 2 MG/ML
8 INJECTION INTRAMUSCULAR; INTRAVENOUS EVERY 6 HOURS PRN
Status: DISCONTINUED | OUTPATIENT
Start: 2025-03-03 | End: 2025-03-03 | Stop reason: HOSPADM

## 2025-03-03 RX ORDER — HEPARIN SODIUM,PORCINE 10 UNIT/ML
5 VIAL (ML) INTRAVENOUS
OUTPATIENT
Start: 2025-04-01

## 2025-03-03 RX ORDER — ONDANSETRON 2 MG/ML
8 INJECTION INTRAMUSCULAR; INTRAVENOUS EVERY 6 HOURS PRN
Status: CANCELLED
Start: 2025-04-01

## 2025-03-03 RX ORDER — HEPARIN SODIUM (PORCINE) LOCK FLUSH IV SOLN 100 UNIT/ML 100 UNIT/ML
5 SOLUTION INTRAVENOUS
Status: DISCONTINUED | OUTPATIENT
Start: 2025-03-03 | End: 2025-03-03 | Stop reason: HOSPADM

## 2025-03-03 RX ORDER — DIPHENHYDRAMINE HCL 25 MG
50 CAPSULE ORAL
Status: COMPLETED | OUTPATIENT
Start: 2025-03-03 | End: 2025-03-03

## 2025-03-03 RX ORDER — KETOROLAC TROMETHAMINE 30 MG/ML
30 INJECTION, SOLUTION INTRAMUSCULAR; INTRAVENOUS ONCE
Status: CANCELLED
Start: 2025-04-01 | End: 2025-04-01

## 2025-03-03 RX ORDER — ONDANSETRON 4 MG/1
8 TABLET, FILM COATED ORAL
Status: CANCELLED
Start: 2025-04-01

## 2025-03-03 RX ORDER — ONDANSETRON 8 MG/1
8 TABLET, ORALLY DISINTEGRATING ORAL
Status: DISCONTINUED | OUTPATIENT
Start: 2025-03-03 | End: 2025-03-03 | Stop reason: HOSPADM

## 2025-03-03 RX ADMIN — ONDANSETRON 8 MG: 2 INJECTION INTRAMUSCULAR; INTRAVENOUS at 11:49

## 2025-03-03 RX ADMIN — HYDROMORPHONE HYDROCHLORIDE 1 MG: 1 INJECTION, SOLUTION INTRAMUSCULAR; INTRAVENOUS; SUBCUTANEOUS at 12:44

## 2025-03-03 RX ADMIN — SODIUM CHLORIDE, POTASSIUM CHLORIDE, SODIUM LACTATE AND CALCIUM CHLORIDE 1000 ML: 600; 310; 30; 20 INJECTION, SOLUTION INTRAVENOUS at 11:45

## 2025-03-03 RX ADMIN — DIPHENHYDRAMINE HYDROCHLORIDE 50 MG: 25 CAPSULE ORAL at 11:49

## 2025-03-03 RX ADMIN — HYDROMORPHONE HYDROCHLORIDE 1 MG: 1 INJECTION, SOLUTION INTRAMUSCULAR; INTRAVENOUS; SUBCUTANEOUS at 11:46

## 2025-03-03 RX ADMIN — KETOROLAC TROMETHAMINE 30 MG: 30 INJECTION, SOLUTION INTRAMUSCULAR; INTRAVENOUS at 11:52

## 2025-03-03 RX ADMIN — HEPARIN 5 ML: 100 SYRINGE at 13:51

## 2025-03-03 RX ADMIN — HYDROMORPHONE HYDROCHLORIDE 1 MG: 1 INJECTION, SOLUTION INTRAMUSCULAR; INTRAVENOUS; SUBCUTANEOUS at 13:49

## 2025-03-03 NOTE — PROGRESS NOTES
Infusion Nursing Note:  Jennifer Cervantes presents today for IVF + pain meds.    Patient seen by provider today: No   present during visit today: Not Applicable.    Note: Pt presents to infusion today in 8/10 chest wall pain. Pt offers no other complaints or concerns.    Pt reports pain upon discharge tolerable at 4/10.    Intravenous Access:  Implanted Port.    Treatment Conditions:  Not Applicable.      Post Infusion Assessment:  Patient tolerated infusion without incident.  Blood return noted pre and post infusion.  Site patent and intact, free from redness, edema or discomfort.  No evidence of extravasations.  Access discontinued per protocol.       Discharge Plan:   Patient declined prescription refills.  Discharge instructions reviewed with: Patient.  Patient and/or family verbalized understanding of discharge instructions and all questions answered.  AVS to patient via [employer].  Patient will return 3/28 for next appointment.   Patient discharged in stable condition accompanied by: self.  Departure Mode: Ambulatory.      Janice Shelley RN

## 2025-03-03 NOTE — PROGRESS NOTES
Ambulatory Care Management- Transportation Support  Specialty SW - [facility]     Data/Intervention:  Patient Name:    Jennifer Cervantes DOB/Age: 2000 (24 year old)     CCRC team member assisting Specialty SW with transportation request.      Assessment:  CHW/CTA called [facility] to arrange medical appointment transportation in conjunction with patient's insurance.     [employer] company: [employer]    Patient will need to call above taxi company at phone number: [phone] when ready for return ride home.      Plan:  Patient is aware of the transportation plan.  available to assist with any other needs.      Lisandra Kilgore MA

## 2025-03-03 NOTE — PATIENT INSTRUCTIONS
[facility] Triage and after hours / weekends / holidays:  [phone]    Please call the triage or after hours line if you experience a temperature greater than or equal to 100.4, shaking chills, have uncontrolled nausea, vomiting and/or diarrhea, dizziness, shortness of breath, chest pain, bleeding, unexplained bruising, or if you have any other new/concerning symptoms, questions or concerns.      If you are having any concerning symptoms or wish to speak to a provider before your next infusion visit, please call triage to notify them so we can adequately serve you.     If you need a refill on a narcotic prescription or other medication, please call before your infusion appointment.                March 2025 Sunday Monday Tuesday Wednesday Thursday Friday Saturday                                 1    Admission   7:37 PM   [facility] Emergency Department   (Discharge: 3/1/2025)    CT CHEST WO   8:20 PM   (20 min.)   URCT1   [facility] Imaging   2     3    ONC INFUSION 3 HR (180 MIN)  11:00 AM   (180 min.)    ONC INFUSION NURSE   [facility] 4  Happy Birthday!     5    NEW KNEE   8:05 AM   (20 min.)   Alexy Navarrete MD   [facility] Sports Medicine [facility]    VIDEO VISIT RETURN   2:15 PM   (30 min.)   Suraj Case MD   [facility] 6     7     8       9     10     11     12     13     14     15       16     17    LAB CENTRAL   1:30 PM   (15 min.)    [facility] DRAW   [facility]    RETURN CCSL   1:45 PM   (30 min.)   Eric Duncan MD   [facility] 18     19    EXTREMITY TREATMENT    9:00 AM   (40 min.)   Emma Garcia PT   [facility] Rehabilitation Services [facility] 20     21     22       23     24     25     26     27    ADULT PSYCHOTHERAPY NEW   2:45 PM   (60 min.)   Shandra Caruso, PhD  NELL   [facility] 28    LAB CENTRAL  11:30 AM   (15 min.)   [facility] DRAW   [facility]    ONC INFUSION 4 HR (240 MIN)  12:00 PM   (240 min.)   [facility] ONC INFUSION NURSE   [facility] 29       30 31 April 2025 Sunday Monday Tuesday Wednesday Thursday Friday Saturday             1     2    EXTREMITY TREATMENT    9:40 AM   (40 min.)   Emma Garcia PT   [facility] 3     4     5       6     7     8     9     10     11     12       13     14     15     16    EXTREMITY TREATMENT    9:40 AM   (40 min.)   Emma Garcia PT   [facility] 17     18     19       20     21     22     23     24     25    LAB CENTRAL  11:30 AM   (15 min.)   [facility] DRAW   [facility]    ONC INFUSION 4 HR (240 MIN)  12:00 PM   (240 min.)    ONC INFUSION NURSE   [facility] 26       27     28     29     30    EXTREMITY TREATMENT    9:40 AM   (40 min.)   Emma Garcia PT   [facility]

## 2025-03-03 NOTE — TELEPHONE ENCOUNTER
Oncology Nurse Triage - Sickle Cell Pain Crisis:    Situation: Jennifer  calling about Sickle Cell Pain Crisis, requesting to be added on for IV fluids and pain medicine    Background:     Patient's last infusion was ED on 3/1/2025 was pain in chest but more chest wall, not chest pain  Last clinic visit date:2/27/2025 farhan Mantilla CNP  Does patient have active treatment plan? Yes    Assessment of Symptoms:  Onset/Duration of symptoms: 3 day    Is it typical sickle cell pain? Yes  Location: chest wall  Character: Sharp  Intensity: 7/10    Any radiation of pain, numbness, tingling, weakness, warmth, swelling, discoloration of arms or legs?No    Fever? No  (if yes max temperature recorded in last 24 hours):      Chest Pain Present: Yes, chest wall    Shortness of breath: No    Other home therapies tried: HEAT/HEATING PAD and WARM BATH     Last home medication taken and when: oxycodone around 6am    Any Refills Needed?: No    Does patient have transportation & length of time to get to clinic: Yes      Recommendations:   0707 Paged Farhan mantilla CNP    0720 approved by farhan Mantilla for IVF/pain    0755 Appt off with [facility] infusion for IVF/pain today at 11:00am. Patient okay with 11:00am, since later in morning patient will need assist with transportation.     0759 message sent to  to assist with transportation    0754 Scheduling request sent to add on appointment.     Please note, if you are late for your appt, you risk losing your infusion appt as it may delay another patient's infusion who arrived on time.

## 2025-03-05 ENCOUNTER — VIRTUAL VISIT (OUTPATIENT)
Dept: INTERNAL MEDICINE | Facility: CLINIC | Age: 25
End: 2025-03-05
Payer: COMMERCIAL

## 2025-03-05 ENCOUNTER — PRE VISIT (OUTPATIENT)
Dept: ORTHOPEDICS | Facility: CLINIC | Age: 25
End: 2025-03-05

## 2025-03-05 DIAGNOSIS — M79.622 PAIN OF LEFT UPPER ARM: ICD-10-CM

## 2025-03-05 DIAGNOSIS — M25.562 ACUTE PAIN OF LEFT KNEE: Primary | ICD-10-CM

## 2025-03-05 DIAGNOSIS — D57.00 SICKLE CELL DISEASE WITH CRISIS (H): Primary | ICD-10-CM

## 2025-03-05 PROCEDURE — 98005 SYNCH AUDIO-VIDEO EST LOW 20: CPT | Performed by: PEDIATRICS

## 2025-03-05 PROCEDURE — 1126F AMNT PAIN NOTED NONE PRSNT: CPT | Performed by: PEDIATRICS

## 2025-03-05 RX ORDER — NAPROXEN 500 MG/1
750 TABLET ORAL DAILY
Qty: 5 TABLET | Refills: 0 | Status: SHIPPED | OUTPATIENT
Start: 2025-03-05

## 2025-03-05 RX ORDER — OXYCODONE HYDROCHLORIDE 5 MG/1
5 TABLET ORAL EVERY 6 HOURS PRN
Qty: 12 TABLET | Refills: 0 | Status: SHIPPED | OUTPATIENT
Start: 2025-03-06

## 2025-03-05 NOTE — PROGRESS NOTES
"Dear patient. Thank you for visiting with me. I want you to feel respected, understood, and empowered. \"Respect\" is valuing you as much as I would a close family member. \"Empowerment\" happens when you are fully informed, and can make the best possible decision for you.  Please ask me questions!  Challenge anything that is not clear.    Medical records are primarily used as memory aids for me and my colleagues. Things to know about my documentation style:  - The 'problem list' includes current symptoms or diagnoses, and some problems that are resolved but may return. I use the past medical history for problems not expected to return.  - I use single quotation marks for things that you or I said, when I want to clarify who was speaking.  - I use double quotation marks when copying a term from elsewhere in your records. Italics (besides here) may also denote a quotation.  If you have questions or concerns, please contact me; I will reply as soon as time allows.    Context    PCP: Suraj Case   Visit type: problem-oriented    Jennifer Cervantes is a 25 year old woman, with concerns including:  Chief Complaint   Patient presents with    RECHECK       History, update, and/or problems        Problem List as of 3/5/2025 Reviewed: 3/5/2025  2:54 PM by Suraj Case MD            Noted    1. 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          Last Assessment & Plan 3/5/2025 Virtual Visit Written 3/5/2025  2:53 PM by Suraj Case MD           SUBJECTIVE:   - Knee locked up approximately 1-2 weeks ago, resulting in inability to walk and ER visit.  - Pain radiating down the leg.  - Difficulty kneeling and standing due to knee pain.  - MRI from February 27, 2025, showed possible bursitis and arthritis, with " normal meniscus.  - No issues with ankle or hip on the affected side.  - Soft tissue asymmetry and tenderness on the affected knee.  - Using an ace bandage for support as knee brace did not fit.  - No use of diclofenac or Voltaren gel prior to this encounter.  Assessment & Plan  Acute pain of left knee:  - Knee pain appears to be due to anserine bursitis and chondromalacia, not due to meniscal problems.  - Continue physical therapy and follow their recommendations at home. Prescribe diclofenac (Voltaren) gel for topical use up to four times a day. Consider possible steroid injection in the bursa if suggested by Doctor Navarrete. Follow up with Doctor Navarrete for further evaluation and advice.            Relevant Medications     diclofenac (VOLTAREN) 1 % topical gel       Start Time: 2:37 PM  End Time:2:49 PM      Jennifer is a 25 year old who is being evaluated via a billable video visit.    How would you like to obtain your AVS? MyChart  If the video visit is dropped, the invitation should be resent by: Text to cell phone: [phone]  Will anyone else be joining your video visit? No      Are refills needed on medications prescribed by this physician? NO      Subjective   Jennifer is a 25 year old, presenting for the following health issues:  RECHECK          HPI Jennifer Cervantes, age: 25 years    Knee Pain  - Knee locked up approximately 1-2 weeks ago, resulting in inability to walk and ER visit.  - Pain radiating down the leg.  - Difficulty kneeling and standing due to knee pain.  - MRI from February 27, 2025, showed possible bursitis and arthritis, with normal meniscus.  - No issues with ankle or hip on the affected side.  - Soft tissue asymmetry and tenderness on the affected knee.  - Using an ace bandage for support as knee brace did not fit.  - No use of diclofenac or Voltaren gel prior to this encounter.          Objective    Vitals - Patient Reported  Systolic (Patient Reported):  (No today)  Weight (Patient  "Reported):  (Not recently)  Height (Patient Reported): 162.6 cm (5' 4\")  Pain Score: No Pain (0)      Vitals:  No vitals were obtained today due to virtual visit.    Physical Exam             Video-Visit Details    Type of service:  Video Visit   Originating Location (pt. Location): Home    Distant Location (provider location):  Off-site  Platform used for Video Visit: Clarisa  Signed Electronically by: Suraj Case MD    "

## 2025-03-05 NOTE — TELEPHONE ENCOUNTER
Narcotic Refill Request  Medication(s) requested:  Oxycodone 5mg tab  Person Requesting Refill: Jennifer   What pain is the medication treating: sickle cell   How is the medication being taken?: 1.5 tablets as needed  Does pt have enough for today? Yes, requesting refill for tomorrow 3/6.   Is pain being adequately controlled on the current regimen?:  yes  Experiencing any side effects from medication?: no    Date of most recent appointment:  2/27/25  Any No Show Visits: none  Next appointment:   3/17/25  Last fill date and by whom:  Dr. Jitendra Perry on 3/1/25 in ED quantity 12 tabs   Reviewed: no access    Send to provider: Patricia Damon RNCC.      Naproxen 500mg tab  Last prescribing provider: Vida Falcon on 1/31/25     Last clinic visit date: 2/27/25 w/ Patricia Mantilla     Recommendations for requested medication (if none, N/A): NA    Any other pertinent information (if none, N/A): pt wondering if she can have a prescription for Naproxen, as it was helpful when she recently had it.     Refilled: Y/N, if NO, why?

## 2025-03-05 NOTE — ASSESSMENT & PLAN NOTE
SUBJECTIVE:   - Knee locked up approximately 1-2 weeks ago, resulting in inability to walk and ER visit.  - Pain radiating down the leg.  - Difficulty kneeling and standing due to knee pain.  - MRI from February 27, 2025, showed possible bursitis and arthritis, with normal meniscus.  - No issues with ankle or hip on the affected side.  - Soft tissue asymmetry and tenderness on the affected knee.  - Using an ace bandage for support as knee brace did not fit.  - No use of diclofenac or Voltaren gel prior to this encounter.  Assessment & Plan  Acute pain of left knee:  - Knee pain appears to be due to anserine bursitis and chondromalacia, not due to meniscal problems.  - Continue physical therapy and follow their recommendations at home. Prescribe diclofenac (Voltaren) gel for topical use up to four times a day. Consider possible steroid injection in the bursa if suggested by Doctor Navarrete. Follow up with Doctor Navarrete for further evaluation and advice.

## 2025-03-06 ENCOUNTER — NURSE TRIAGE (OUTPATIENT)
Dept: ONCOLOGY | Facility: CLINIC | Age: 25
End: 2025-03-06
Payer: COMMERCIAL

## 2025-03-06 ENCOUNTER — PATIENT OUTREACH (OUTPATIENT)
Dept: CARE COORDINATION | Facility: CLINIC | Age: 25
End: 2025-03-06
Payer: COMMERCIAL

## 2025-03-06 ENCOUNTER — INFUSION THERAPY VISIT (OUTPATIENT)
Dept: INFUSION THERAPY | Facility: CLINIC | Age: 25
End: 2025-03-06
Attending: PEDIATRICS
Payer: COMMERCIAL

## 2025-03-06 VITALS
OXYGEN SATURATION: 94 % | DIASTOLIC BLOOD PRESSURE: 53 MMHG | HEART RATE: 90 BPM | SYSTOLIC BLOOD PRESSURE: 108 MMHG | RESPIRATION RATE: 18 BRPM | TEMPERATURE: 98.2 F

## 2025-03-06 DIAGNOSIS — D57.00 SICKLE CELL PAIN CRISIS (H): Primary | ICD-10-CM

## 2025-03-06 DIAGNOSIS — G81.10 SPASTIC HEMIPLEGIA, UNSPECIFIED ETIOLOGY, UNSPECIFIED LATERALITY (H): ICD-10-CM

## 2025-03-06 PROCEDURE — 250N000011 HC RX IP 250 OP 636: Performed by: PEDIATRICS

## 2025-03-06 PROCEDURE — 258N000003 HC RX IP 258 OP 636: Performed by: PEDIATRICS

## 2025-03-06 PROCEDURE — 250N000013 HC RX MED GY IP 250 OP 250 PS 637: Performed by: PEDIATRICS

## 2025-03-06 RX ORDER — HEPARIN SODIUM (PORCINE) LOCK FLUSH IV SOLN 100 UNIT/ML 100 UNIT/ML
5 SOLUTION INTRAVENOUS
Status: DISCONTINUED | OUTPATIENT
Start: 2025-03-06 | End: 2025-03-06 | Stop reason: HOSPADM

## 2025-03-06 RX ORDER — ONDANSETRON 8 MG/1
8 TABLET, ORALLY DISINTEGRATING ORAL
Status: DISCONTINUED | OUTPATIENT
Start: 2025-03-06 | End: 2025-03-06 | Stop reason: HOSPADM

## 2025-03-06 RX ORDER — DIPHENHYDRAMINE HCL 25 MG
50 CAPSULE ORAL
Start: 2025-04-01

## 2025-03-06 RX ORDER — ONDANSETRON 2 MG/ML
8 INJECTION INTRAMUSCULAR; INTRAVENOUS EVERY 6 HOURS PRN
Start: 2025-04-01

## 2025-03-06 RX ORDER — HEPARIN SODIUM (PORCINE) LOCK FLUSH IV SOLN 100 UNIT/ML 100 UNIT/ML
5 SOLUTION INTRAVENOUS
OUTPATIENT
Start: 2025-04-01

## 2025-03-06 RX ORDER — ONDANSETRON 2 MG/ML
8 INJECTION INTRAMUSCULAR; INTRAVENOUS EVERY 6 HOURS PRN
Status: DISCONTINUED | OUTPATIENT
Start: 2025-03-06 | End: 2025-03-06 | Stop reason: HOSPADM

## 2025-03-06 RX ORDER — ONDANSETRON 4 MG/1
8 TABLET, FILM COATED ORAL
Start: 2025-04-01

## 2025-03-06 RX ORDER — DIPHENHYDRAMINE HCL 25 MG
50 CAPSULE ORAL
Status: COMPLETED | OUTPATIENT
Start: 2025-03-06 | End: 2025-03-06

## 2025-03-06 RX ORDER — HEPARIN SODIUM,PORCINE 10 UNIT/ML
5 VIAL (ML) INTRAVENOUS
OUTPATIENT
Start: 2025-04-01

## 2025-03-06 RX ORDER — KETOROLAC TROMETHAMINE 30 MG/ML
30 INJECTION, SOLUTION INTRAMUSCULAR; INTRAVENOUS ONCE
Start: 2025-04-01 | End: 2025-04-01

## 2025-03-06 RX ORDER — KETOROLAC TROMETHAMINE 30 MG/ML
30 INJECTION, SOLUTION INTRAMUSCULAR; INTRAVENOUS ONCE
Status: COMPLETED | OUTPATIENT
Start: 2025-03-06 | End: 2025-03-06

## 2025-03-06 RX ADMIN — HEPARIN 5 ML: 100 SYRINGE at 14:41

## 2025-03-06 RX ADMIN — KETOROLAC TROMETHAMINE 30 MG: 30 INJECTION, SOLUTION INTRAMUSCULAR; INTRAVENOUS at 11:44

## 2025-03-06 RX ADMIN — HYDROMORPHONE HYDROCHLORIDE 1 MG: 1 INJECTION, SOLUTION INTRAMUSCULAR; INTRAVENOUS; SUBCUTANEOUS at 12:39

## 2025-03-06 RX ADMIN — HYDROMORPHONE HYDROCHLORIDE 1 MG: 1 INJECTION, SOLUTION INTRAMUSCULAR; INTRAVENOUS; SUBCUTANEOUS at 11:41

## 2025-03-06 RX ADMIN — HYDROMORPHONE HYDROCHLORIDE 1 MG: 1 INJECTION, SOLUTION INTRAMUSCULAR; INTRAVENOUS; SUBCUTANEOUS at 14:01

## 2025-03-06 RX ADMIN — ONDANSETRON 8 MG: 2 INJECTION INTRAMUSCULAR; INTRAVENOUS at 11:42

## 2025-03-06 RX ADMIN — SODIUM CHLORIDE, SODIUM LACTATE, POTASSIUM CHLORIDE, AND CALCIUM CHLORIDE 1000 ML: .6; .31; .03; .02 INJECTION, SOLUTION INTRAVENOUS at 11:39

## 2025-03-06 RX ADMIN — DIPHENHYDRAMINE HYDROCHLORIDE 50 MG: 25 CAPSULE ORAL at 11:32

## 2025-03-06 NOTE — TELEPHONE ENCOUNTER
Pt requesting SW assist w/ transportation home from infusion appt. She was just called back, estimates she will be done around 3pm.   1211 message sent to [employer].

## 2025-03-06 NOTE — TELEPHONE ENCOUNTER
"Oncology Nurse Triage - Sickle Cell Pain Crisis:    Situation: Jennifer  calling about Sickle Cell Pain Crisis, requesting to be added on for IV fluids and pain medicine    Background:     Patient's last infusion was 03/03/25  Last clinic visit date:02/27/25 w/Patricia Mantilla  Does patient have active treatment plan? Yes    Assessment of Symptoms:  Onset/Duration of symptoms: 2 day    Is it typical sickle cell pain? Yes  Location: \"everywhere\"  Character: Sharp and Dull ache  Intensity: 10/10    Any radiation of pain, numbness, tingling, weakness, warmth, swelling, discoloration of arms or legs?No    Fever? No    Chest Pain Present: No    Shortness of breath: No    Other home therapies tried: HEAT/HEATING PAD and WARM BATH     Last home medication taken and when: 10 pm oxycodone and naproxen    Any Refills Needed?: No    Does patient have transportation & length of time to get to clinic: Yes, 30 min.      Recommendations:   Added to infusion wait list for IVF/pain meds per protocol.    Reviewed with pt if she does not hear from the infusion center by 2pm then you will not be able to get in for an infusion today. If symptoms worsen while waiting for call back, and/or you experience fever, chills, SOB, chest pain, cough, n/v, dizziness, numbness, swelling, discoloration of extremities, then seek emergency evaluation in Emergency Department.             "

## 2025-03-06 NOTE — PATIENT INSTRUCTIONS
Dear Jennifer Cervantes    Thank you for choosing [facility] ([facility] for your infusion.  The following information is a summary of our appointment as well as important reminders.    If you have any questions on your upcoming Specialty Infusion appointments, please call scheduling at [phone].  It was a pleasure taking care of you today.    Sincerely,    [facility]  [address] [place] [address]  [address]  Phone:  [phone]

## 2025-03-06 NOTE — TELEPHONE ENCOUNTER
Call from pt, who states she will be about 5 minutes late to her 11am appt. [facility] infusion updated.

## 2025-03-06 NOTE — TELEPHONE ENCOUNTER
Jennifer is calling to check on openings for infusion.  States she has been in pain since last night.   Asking about [facility].  This writer informed her that [facility] is full today so will not have any openings.   No other openings at [facility] or [facility] today either.  Pt voiced understanding.

## 2025-03-06 NOTE — PROGRESS NOTES
Ambulatory Care Management- Transportation Support  Specialty SW - [facility]     Data/Intervention:  Patient Name:    Jennifer Cervantes DOB/Age: 2000 (25 year old)     CCRC team member assisting Specialty SW with transportation request.      Assessment:  CHW/CTA called [employer] [facility] to arrange medical appointment transportation in conjunction with patient's insurance.     [employer] company: [employer]    Patient will need to call above taxi company at phone number: [phone] when ready for return ride home.      Plan:  Patient is aware of the transportation plan.  available to assist with any other needs.      Lisandra Kilgore MA

## 2025-03-06 NOTE — TELEPHONE ENCOUNTER
Available appt with [facility] at 1100. Call placed to pt to verify she can make appt. Pt confirming she can make 1100 appt. Message sent to [facility] to have her added on.

## 2025-03-06 NOTE — PROGRESS NOTES
Infusion Nursing Note:  Jennifer Cervantes presents today for IV infusion; IVF + pain medication.    Patient seen by provider today: No   present during visit today: Not Applicable.    Note:   Administrations This Visit       diphenhydrAMINE (BENADRYL) capsule 50 mg       Admin Date  03/06/2025 Action  $Given Dose  50 mg Route  Oral Documented By  Geraldo Mccormick RN              heparin lock flush 100 unit/mL injection 5 mL       Admin Date  03/06/2025 Action  $Given Dose  5 mL Route  Intracatheter Documented By  Geraldo Mccormick RN              HYDROmorphone (DILAUDID) injection 1 mg       Admin Date  03/06/2025 Action  $Given Dose  1 mg Route  Intravenous Documented By  Geraldo Mccormick RN               Admin Date  03/06/2025 Action  $Given Dose  1 mg Route  Intravenous Documented By  Geraldo Mccormick RN               Admin Date  03/06/2025 Action  $Given Dose  1 mg Route  Intravenous Documented By  Geraldo Mccormick RN              ketorolac (TORADOL) injection 30 mg       Admin Date  03/06/2025 Action  $Given Dose  30 mg Route  Intravenous Documented By  Geraldo Mccormick RN              lactated ringers BOLUS 1,000 mL       Admin Date  03/06/2025 Action  $New Bag Dose  1,000 mL Rate  500 mL/hr Route  Intravenous Documented By  Geraldo Mccormick RN              ondansetron (ZOFRAN) injection 8 mg       Admin Date  03/06/2025 Action  $Given Dose  8 mg Route  Intravenous Documented By  Geraldo Mccormick RN                  Temp: 98.2  F (36.8  C) Temp src: Oral BP: 108/53 Pulse: 90   Resp: 18 SpO2: 94 % O2 Device: None (Room air)      Intravenous Access:  Implanted Port.    Treatment Conditions:  Not Applicable.    Post Infusion Assessment:  Patient tolerated infusion without incident.  Blood return noted pre and post infusion.  Site patent and intact, free from redness, edema or discomfort.  No evidence of extravasations.  Access discontinued per protocol.     Discharge Plan:   Patient and/or family verbalized  understanding of discharge instructions and all questions answered.  AVS to patient via [employer].  Patient will return as needed for next appointment.   Patient discharged in stable condition accompanied by: self.  Departure Mode: Ambulatory.    Geraldo Mccormick RN

## 2025-03-08 ENCOUNTER — APPOINTMENT (OUTPATIENT)
Dept: GENERAL RADIOLOGY | Facility: CLINIC | Age: 25
End: 2025-03-08
Attending: EMERGENCY MEDICINE
Payer: COMMERCIAL

## 2025-03-08 ENCOUNTER — HOSPITAL ENCOUNTER (EMERGENCY)
Facility: CLINIC | Age: 25
Discharge: HOME | End: 2025-03-08
Attending: EMERGENCY MEDICINE | Admitting: EMERGENCY MEDICINE
Payer: COMMERCIAL

## 2025-03-08 VITALS
HEART RATE: 88 BPM | OXYGEN SATURATION: 100 % | TEMPERATURE: 98 F | RESPIRATION RATE: 18 BRPM | BODY MASS INDEX: 27.93 KG/M2 | SYSTOLIC BLOOD PRESSURE: 112 MMHG | DIASTOLIC BLOOD PRESSURE: 75 MMHG | WEIGHT: 162.7 LBS

## 2025-03-08 DIAGNOSIS — D57.00 SICKLE CELL PAIN CRISIS (H): ICD-10-CM

## 2025-03-08 LAB
ANION GAP SERPL CALCULATED.3IONS-SCNC: 10 MMOL/L (ref 7–15)
ATRIAL RATE - MUSE: 84 BPM
BASOPHILS # BLD AUTO: 0.2 10E3/UL (ref 0–0.2)
BASOPHILS NFR BLD AUTO: 2 %
BUN SERPL-MCNC: 6.9 MG/DL (ref 6–20)
CALCIUM SERPL-MCNC: 8.6 MG/DL (ref 8.8–10.4)
CHLORIDE SERPL-SCNC: 104 MMOL/L (ref 98–107)
CREAT SERPL-MCNC: 0.63 MG/DL (ref 0.51–0.95)
DIASTOLIC BLOOD PRESSURE - MUSE: NORMAL MMHG
EGFRCR SERPLBLD CKD-EPI 2021: >90 ML/MIN/1.73M2
EOSINOPHIL # BLD AUTO: 0.3 10E3/UL (ref 0–0.7)
EOSINOPHIL NFR BLD AUTO: 3 %
ERYTHROCYTE [DISTWIDTH] IN BLOOD BY AUTOMATED COUNT: 20.9 % (ref 10–15)
GLUCOSE SERPL-MCNC: 91 MG/DL (ref 70–99)
HCO3 SERPL-SCNC: 24 MMOL/L (ref 22–29)
HCT VFR BLD AUTO: 19.3 % (ref 35–47)
HGB BLD-MCNC: 6.6 G/DL (ref 11.7–15.7)
IMM GRANULOCYTES # BLD: 0.1 10E3/UL
IMM GRANULOCYTES NFR BLD: 1 %
INTERPRETATION ECG - MUSE: NORMAL
LYMPHOCYTES # BLD AUTO: 2.5 10E3/UL (ref 0.8–5.3)
LYMPHOCYTES NFR BLD AUTO: 20 %
MCH RBC QN AUTO: 30 PG (ref 26.5–33)
MCHC RBC AUTO-ENTMCNC: 34.2 G/DL (ref 31.5–36.5)
MCV RBC AUTO: 88 FL (ref 78–100)
MONOCYTES # BLD AUTO: 0.9 10E3/UL (ref 0–1.3)
MONOCYTES NFR BLD AUTO: 8 %
NEUTROPHILS # BLD AUTO: 8.4 10E3/UL (ref 1.6–8.3)
NEUTROPHILS NFR BLD AUTO: 68 %
NRBC # BLD AUTO: 0.2 10E3/UL
NRBC BLD AUTO-RTO: 1 /100
P AXIS - MUSE: 70 DEGREES
PLATELET # BLD AUTO: 540 10E3/UL (ref 150–450)
POTASSIUM SERPL-SCNC: 3.7 MMOL/L (ref 3.4–5.3)
PR INTERVAL - MUSE: 154 MS
QRS DURATION - MUSE: 74 MS
QT - MUSE: 400 MS
QTC - MUSE: 472 MS
R AXIS - MUSE: 50 DEGREES
RBC # BLD AUTO: 2.2 10E6/UL (ref 3.8–5.2)
RETICS # AUTO: 0.48 10E6/UL (ref 0.03–0.1)
RETICS/RBC NFR AUTO: 21.8 % (ref 0.5–2)
SODIUM SERPL-SCNC: 138 MMOL/L (ref 135–145)
SYSTOLIC BLOOD PRESSURE - MUSE: NORMAL MMHG
T AXIS - MUSE: 47 DEGREES
TROPONIN T SERPL HS-MCNC: <6 NG/L
VENTRICULAR RATE- MUSE: 84 BPM
WBC # BLD AUTO: 12.4 10E3/UL (ref 4–11)

## 2025-03-08 PROCEDURE — 80048 BASIC METABOLIC PNL TOTAL CA: CPT | Performed by: EMERGENCY MEDICINE

## 2025-03-08 PROCEDURE — 258N000003 HC RX IP 258 OP 636: Performed by: EMERGENCY MEDICINE

## 2025-03-08 PROCEDURE — 85045 AUTOMATED RETICULOCYTE COUNT: CPT | Performed by: EMERGENCY MEDICINE

## 2025-03-08 PROCEDURE — 85018 HEMOGLOBIN: CPT | Performed by: EMERGENCY MEDICINE

## 2025-03-08 PROCEDURE — 96376 TX/PRO/DX INJ SAME DRUG ADON: CPT | Performed by: EMERGENCY MEDICINE

## 2025-03-08 PROCEDURE — 99285 EMERGENCY DEPT VISIT HI MDM: CPT | Performed by: EMERGENCY MEDICINE

## 2025-03-08 PROCEDURE — 96361 HYDRATE IV INFUSION ADD-ON: CPT | Performed by: EMERGENCY MEDICINE

## 2025-03-08 PROCEDURE — 96375 TX/PRO/DX INJ NEW DRUG ADDON: CPT | Performed by: EMERGENCY MEDICINE

## 2025-03-08 PROCEDURE — 36415 COLL VENOUS BLD VENIPUNCTURE: CPT | Performed by: EMERGENCY MEDICINE

## 2025-03-08 PROCEDURE — 250N000011 HC RX IP 250 OP 636: Performed by: EMERGENCY MEDICINE

## 2025-03-08 PROCEDURE — 85025 COMPLETE CBC W/AUTO DIFF WBC: CPT | Performed by: EMERGENCY MEDICINE

## 2025-03-08 PROCEDURE — 99285 EMERGENCY DEPT VISIT HI MDM: CPT | Mod: 25 | Performed by: EMERGENCY MEDICINE

## 2025-03-08 PROCEDURE — 84484 ASSAY OF TROPONIN QUANT: CPT | Performed by: EMERGENCY MEDICINE

## 2025-03-08 PROCEDURE — 250N000011 HC RX IP 250 OP 636: Performed by: FAMILY MEDICINE

## 2025-03-08 PROCEDURE — 71046 X-RAY EXAM CHEST 2 VIEWS: CPT

## 2025-03-08 PROCEDURE — 96374 THER/PROPH/DIAG INJ IV PUSH: CPT | Performed by: EMERGENCY MEDICINE

## 2025-03-08 PROCEDURE — 93005 ELECTROCARDIOGRAM TRACING: CPT | Performed by: EMERGENCY MEDICINE

## 2025-03-08 RX ORDER — HEPARIN SODIUM,PORCINE 10 UNIT/ML
5-10 VIAL (ML) INTRAVENOUS
Status: DISCONTINUED | OUTPATIENT
Start: 2025-03-08 | End: 2025-03-08 | Stop reason: HOSPADM

## 2025-03-08 RX ORDER — KETOROLAC TROMETHAMINE 30 MG/ML
30 INJECTION, SOLUTION INTRAMUSCULAR; INTRAVENOUS ONCE
Status: COMPLETED | OUTPATIENT
Start: 2025-03-08 | End: 2025-03-08

## 2025-03-08 RX ORDER — HEPARIN SODIUM (PORCINE) LOCK FLUSH IV SOLN 100 UNIT/ML 100 UNIT/ML
5-10 SOLUTION INTRAVENOUS
Status: DISCONTINUED | OUTPATIENT
Start: 2025-03-08 | End: 2025-03-08 | Stop reason: HOSPADM

## 2025-03-08 RX ORDER — ONDANSETRON 2 MG/ML
4 INJECTION INTRAMUSCULAR; INTRAVENOUS
Status: COMPLETED | OUTPATIENT
Start: 2025-03-08 | End: 2025-03-08

## 2025-03-08 RX ORDER — HEPARIN SODIUM,PORCINE 10 UNIT/ML
5-10 VIAL (ML) INTRAVENOUS EVERY 24 HOURS
Status: DISCONTINUED | OUTPATIENT
Start: 2025-03-08 | End: 2025-03-08 | Stop reason: HOSPADM

## 2025-03-08 RX ORDER — SODIUM CHLORIDE, SODIUM LACTATE, POTASSIUM CHLORIDE, CALCIUM CHLORIDE 600; 310; 30; 20 MG/100ML; MG/100ML; MG/100ML; MG/100ML
INJECTION, SOLUTION INTRAVENOUS CONTINUOUS
Status: DISCONTINUED | OUTPATIENT
Start: 2025-03-08 | End: 2025-03-08 | Stop reason: HOSPADM

## 2025-03-08 RX ADMIN — ONDANSETRON 4 MG: 2 INJECTION INTRAMUSCULAR; INTRAVENOUS at 04:46

## 2025-03-08 RX ADMIN — HYDROMORPHONE HYDROCHLORIDE 1 MG: 1 INJECTION, SOLUTION INTRAMUSCULAR; INTRAVENOUS; SUBCUTANEOUS at 07:01

## 2025-03-08 RX ADMIN — HYDROMORPHONE HYDROCHLORIDE 1 MG: 1 INJECTION, SOLUTION INTRAMUSCULAR; INTRAVENOUS; SUBCUTANEOUS at 04:50

## 2025-03-08 RX ADMIN — KETOROLAC TROMETHAMINE 30 MG: 30 INJECTION, SOLUTION INTRAMUSCULAR at 04:46

## 2025-03-08 RX ADMIN — HYDROMORPHONE HYDROCHLORIDE 1 MG: 1 INJECTION, SOLUTION INTRAMUSCULAR; INTRAVENOUS; SUBCUTANEOUS at 06:01

## 2025-03-08 RX ADMIN — HEPARIN 5 ML: 100 SYRINGE at 10:21

## 2025-03-08 RX ADMIN — SODIUM CHLORIDE, POTASSIUM CHLORIDE, SODIUM LACTATE AND CALCIUM CHLORIDE: 600; 310; 30; 20 INJECTION, SOLUTION INTRAVENOUS at 04:50

## 2025-03-08 ASSESSMENT — ACTIVITIES OF DAILY LIVING (ADL)
ADLS_ACUITY_SCORE: 58

## 2025-03-08 NOTE — DISCHARGE INSTRUCTIONS
Thank you for coming to the [facility] Emergency Department.     Please continue your medications as prescribed.   Follow up with Primary Care and Hematology as scheduled.     For your knee, continue to use the ace wrap, apply ice, use the naproxen cream. Keep your appointment with sports medicine on Wednesday.

## 2025-03-08 NOTE — ED PROVIDER NOTES
ED Provider Note  [facility]      History     Chief Complaint   Patient presents with    Sickle Cell Pain Crisis     HPI  Jennifer Cervantes is a 25 year old female who presents with chest pain.   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   Awoke tonight with chest pain. Sternal. Feels like the chest wall, and not deep in the chest or back.   No shortness of breath, cough or fever.   Continues to have issues with the left knee. Has been using ACE. Was unable to make immobilizer fit reliably. MRI done and shows pes anserine bursitis. Given topical naproxen. Has appointment with Sports coming up this week on 3/12.   No new calf pain or leg swelling.  Had CT chest negative for PE on 3/1/25.     Past Medical History  Past Medical History:   Diagnosis Date    Anxiety     Bleeding disorder     Blood clotting disorder     Cerebral infarction (H) 2015    Cognitive developmental delay     low IQ. 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 side (H)     right hand contractures    Iron overload due to repeated red blood cell transfusions     Migraines     Multiple subsegmental pulmonary emboli without acute cor pulmonale (H) 02/01/2021    Oppositional defiant behavior     Presence of intrauterine contraceptive device 2/18/2020    Superficial venous thrombosis of arm, right 03/25/2021    Uncomplicated asthma      Past Surgical History:   Procedure Laterality Date    AS INSERT TUNNELED CV 2 CATH W/O PORT/PUMP      CHOLECYSTECTOMY      CV RIGHT HEART CATH MEASUREMENTS RECORDED N/A 11/18/2021    Procedure: Right Heart Cath;  Surgeon: Jackson Stauffer MD;  Location:  HEART CARDIAC CATH LAB    INSERT PORT VASCULAR ACCESS Left 4/21/2021    Procedure: INSERTION, VASCULAR ACCESS PORT (NOT SURE ON SIDE UNTIL REMOVAL);  Surgeon: Rajan More MD;  [place] [facility] [place]    IR CHEST  PORT PLACEMENT > 5 YRS OF AGE  4/21/2021    IR CVC NON TUNNEL LINE REMOVAL  5/6/2021    IR CVC NON TUNNEL PLACEMENT > 5 YRS  04/07/2020    IR CVC NON TUNNEL PLACEMENT > 5 YRS  4/30/2021    IR CVC NON TUNNEL PLACEMENT > 5 YRS  9/7/2022    IR PORT REMOVAL LEFT  4/21/2021    REMOVE PORT VASCULAR ACCESS Left 4/21/2021    Procedure: REMOVAL, VASCULAR ACCESS PORT LEFT;  Surgeon: Rajan More MD;  Location: [facility] OR    REPAIR TENDON ELBOW Right 10/02/2019    Procedure: Right Elbow Flexor Lengthening, Flexor Pronator Slide Of Wrist and Finger, Thumb Adductor Lengthening;  Surgeon: Anai Franco MD;  Location: [facility] OR    TONSILLECTOMY Bilateral 10/02/2019    Procedure: Bilateral Tonsillectomy;  Surgeon: Farhana Guy MD;  Location: [facility] OR    ZZC BREAST REDUCTION (INCLUDES LIPO) TIER 3 Bilateral 04/23/2019     albuterol (PROVENTIL) (2.5 MG/3ML) 0.083% neb solution  aspirin (ASA) 81 MG chewable tablet  budesonide-formoterol (SYMBICORT/BREYNA) 160-4.5 MCG/ACT Inhaler  deferasirox (JADENU) 360 MG tablet  Deferiprone, Twice Daily, 1000 MG TABS  diclofenac (VOLTAREN) 1 % topical gel  diphenhydrAMINE (BENADRYL) 50 MG capsule  EPINEPHrine (ANY BX GENERIC EQUIV) 0.3 MG/0.3ML injection 2-pack  gabapentin (NEURONTIN) 300 MG capsule  hydroxyurea (HYDREA) 500 MG capsule  ibuprofen (ADVIL/MOTRIN) 800 MG tablet  methocarbamol (ROBAXIN) 750 MG tablet  methylPREDNISolone (MEDROL) 32 MG tablet  naloxone (NARCAN) 4 MG/0.1ML nasal spray  naproxen (NAPROSYN) 500 MG tablet  oxyCODONE (ROXICODONE) 5 MG tablet  [START ON 3/10/2025] oxyCODONE IR (ROXICODONE) 10 MG tablet      Allergies   Allergen Reactions    Contrast Dye Angioedema     Hives and breathing issues    Fish-Derived Products Hives    Seafood Hives    Adhesive Tape Hives     Primipore dressing causes hives    Gadolinium     Iodinated Contrast Media      Family History  Family History   Problem Relation Age of Onset    Sickle Cell Trait Mother     Hypertension Mother      Asthma Mother     Sickle Cell Trait Father     Glaucoma No family hx of     Macular Degeneration No family hx of     Diabetes No family hx of     Gout No family hx of      Social History   Social History     Tobacco Use    Smoking status: Never     Passive exposure: Never    Smokeless tobacco: Never   Substance Use Topics    Alcohol use: Not Currently     Alcohol/week: 0.0 standard drinks of alcohol    Drug use: Never      A medically appropriate review of systems was performed with pertinent positives and negatives noted in the HPI, and all other systems negative.    Physical Exam   BP: 115/85  Pulse: 88  Temp: 97.8  F (36.6  C)  Resp: 18  Weight: 73.8 kg (162 lb 11.2 oz)  SpO2: 93 %    Physical Exam  Gen:A&Ox3, no acute distress  CV:RRR without murmurs  PULM:Clear to auscultation bilaterally  Abd:soft, nontender, nondistended. Bowel sounds present and normal  UE:No traumatic injuries, skin normal  LE:no traumatic injuries, skin normal  Neuro: chronic right sided weakness  Skin: no rashes or ecchymoses    ED Course, Procedures, & Data      Procedures         EKG Interpretation:      Interpreted by Cornelia Malloy MD  Time reviewed: 04:34  Symptoms at time of EKG: chest wall pain  Rhythm: normal sinus   Rate: 84  Axis: NORMAL  Ectopy: none  Conduction: normal  ST Segments/ T Waves: No ST-T wave changes  Q Waves: none  Comparison to prior: Unchanged    Clinical Impression: normal EKG       Results for orders placed or performed during the hospital encounter of 03/08/25   Basic metabolic panel     Status: Abnormal   Result Value Ref Range    Sodium 138 135 - 145 mmol/L    Potassium 3.7 3.4 - 5.3 mmol/L    Chloride 104 98 - 107 mmol/L    Carbon Dioxide (CO2) 24 22 - 29 mmol/L    Anion Gap 10 7 - 15 mmol/L    Urea Nitrogen 6.9 6.0 - 20.0 mg/dL    Creatinine 0.63 0.51 - 0.95 mg/dL    GFR Estimate >90 >60 mL/min/1.73m2    Calcium 8.6 (L) 8.8 - 10.4 mg/dL    Glucose 91 70 - 99 mg/dL   Reticulocyte count     Status:  Abnormal   Result Value Ref Range    % Reticulocyte 21.8 (H) 0.5 - 2.0 %    Absolute Reticulocyte 0.476 (H) 0.025 - 0.095 10e6/uL   Troponin T, High Sensitivity     Status: Normal   Result Value Ref Range    Troponin T, High Sensitivity <6 <=14 ng/L   CBC with platelets and differential     Status: Abnormal   Result Value Ref Range    WBC Count 12.4 (H) 4.0 - 11.0 10e3/uL    RBC Count 2.20 (L) 3.80 - 5.20 10e6/uL    Hemoglobin 6.6 (LL) 11.7 - 15.7 g/dL    Hematocrit 19.3 (L) 35.0 - 47.0 %    MCV 88 78 - 100 fL    MCH 30.0 26.5 - 33.0 pg    MCHC 34.2 31.5 - 36.5 g/dL    RDW 20.9 (H) 10.0 - 15.0 %    Platelet Count 540 (H) 150 - 450 10e3/uL    % Neutrophils 68 %    % Lymphocytes 20 %    % Monocytes 8 %    % Eosinophils 3 %    % Basophils 2 %    % Immature Granulocytes 1 %    NRBCs per 100 WBC 1 (H) <1 /100    Absolute Neutrophils 8.4 (H) 1.6 - 8.3 10e3/uL    Absolute Lymphocytes 2.5 0.8 - 5.3 10e3/uL    Absolute Monocytes 0.9 0.0 - 1.3 10e3/uL    Absolute Eosinophils 0.3 0.0 - 0.7 10e3/uL    Absolute Basophils 0.2 0.0 - 0.2 10e3/uL    Absolute Immature Granulocytes 0.1 <=0.4 10e3/uL    Absolute NRBCs 0.2 10e3/uL   EKG 12 lead     Status: None (Preliminary result)   Result Value Ref Range    Systolic Blood Pressure  mmHg    Diastolic Blood Pressure  mmHg    Ventricular Rate 84 BPM    Atrial Rate 84 BPM    CO Interval 154 ms    QRS Duration 74 ms     ms    QTc 472 ms    P Axis 70 degrees    R AXIS 50 degrees    T Axis 47 degrees    Interpretation ECG Sinus rhythm  Normal ECG      CBC with platelets differential     Status: Abnormal    Narrative    The following orders were created for panel order CBC with platelets differential.  Procedure                               Abnormality         Status                     ---------                               -----------         ------                     CBC with platelets and ...[[id number]]  Abnormal            Final result                 Please view results for  these tests on the individual orders.     Medications   HYDROmorphone (DILAUDID) injection 1 mg (1 mg Intravenous $Given 3/8/25 0601)   lactated ringers infusion ( Intravenous Rate/Dose Verify 3/8/25 9255)   ketorolac (TORADOL) injection 30 mg (30 mg Intravenous $Given 3/8/25 8026)   ondansetron (ZOFRAN) injection 4 mg (4 mg Intravenous $Given 3/8/25 0446)     Labs Ordered and Resulted from Time of ED Arrival to Time of ED Departure   BASIC METABOLIC PANEL - Abnormal       Result Value    Sodium 138      Potassium 3.7      Chloride 104      Carbon Dioxide (CO2) 24      Anion Gap 10      Urea Nitrogen 6.9      Creatinine 0.63      GFR Estimate >90      Calcium 8.6 (*)     Glucose 91     RETICULOCYTE COUNT - Abnormal    % Reticulocyte 21.8 (*)     Absolute Reticulocyte 0.476 (*)    CBC WITH PLATELETS AND DIFFERENTIAL - Abnormal    WBC Count 12.4 (*)     RBC Count 2.20 (*)     Hemoglobin 6.6 (*)     Hematocrit 19.3 (*)     MCV 88      MCH 30.0      MCHC 34.2      RDW 20.9 (*)     Platelet Count 540 (*)     % Neutrophils 68      % Lymphocytes 20      % Monocytes 8      % Eosinophils 3      % Basophils 2      % Immature Granulocytes 1      NRBCs per 100 WBC 1 (*)     Absolute Neutrophils 8.4 (*)     Absolute Lymphocytes 2.5      Absolute Monocytes 0.9      Absolute Eosinophils 0.3      Absolute Basophils 0.2      Absolute Immature Granulocytes 0.1      Absolute NRBCs 0.2     TROPONIN T, HIGH SENSITIVITY - Normal    Troponin T, High Sensitivity <6       Chest XR,  PA & LAT    (Results Pending)          Critical care was not performed.     Medical Decision Making  The patient's presentation was of high complexity (a chronic illness severe exacerbation, progression, or side effect of treatment).    The patient's evaluation involved:  review of 2 test result(s) ordered prior to this encounter (most recent CT chest and MRI knee)    The patient's management necessitated high risk (a parenteral controlled  substance).    Assessment & Plan    26 yo F presenting with chest wall pain.   Hx of sickle cell disease.   Vitals stable.   Without infectious symptoms or SOB to suggest acute chest syndrome.   CXR independently reviewed by me showing clear lungs, normal heart size, no pneumothorax or pleural effusion. Port-a-cath in expected position.   IV access via port-a-cath and CBC, BMP, retic count and troponin done.   Notable for anemia with hgb 6.6 today. WBC 12.4, stable. Retic 21%  ED pain management plan reviewed and administered.   Discharged. Follow up with Hematology regarding worsening anemia. Last transfusion ~1 month ago.     I have reviewed the nursing notes. I have reviewed the findings, diagnosis, plan and need for follow up with the patient.    New Prescriptions    No medications on file       Final diagnoses:   Sickle cell pain crisis (H)       Cornelia Malloy MD   [facility] ED EMERGENCY DEPARTMENT  3/8/2025     Cornelia Malloy MD  03/08/25 0655       Cornelia Malloy MD  03/08/25 0747

## 2025-03-08 NOTE — ED TRIAGE NOTES
Patient reports chest wall pain 7/10 that started around 0200 this morning. Patient states it woke her up out of her sleep. Patient took PRN oxycodone 12.5 mg at 0215. States it did not help.      Triage Assessment (Adult)       Row Name 03/08/25 0414          Triage Assessment    Airway WDL WDL        Respiratory WDL    Respiratory WDL WDL        Skin Circulation/Temperature WDL    Skin Circulation/Temperature WDL WDL        Cardiac WDL    Cardiac WDL WDL        Peripheral/Neurovascular WDL    Peripheral Neurovascular WDL WDL        Cognitive/Neuro/Behavioral WDL    Cognitive/Neuro/Behavioral WDL WDL

## 2025-03-10 ENCOUNTER — INFUSION THERAPY VISIT (OUTPATIENT)
Dept: INFUSION THERAPY | Facility: CLINIC | Age: 25
End: 2025-03-10
Attending: PEDIATRICS
Payer: COMMERCIAL

## 2025-03-10 ENCOUNTER — PATIENT OUTREACH (OUTPATIENT)
Dept: CARE COORDINATION | Facility: CLINIC | Age: 25
End: 2025-03-10
Payer: COMMERCIAL

## 2025-03-10 ENCOUNTER — NURSE TRIAGE (OUTPATIENT)
Dept: ONCOLOGY | Facility: CLINIC | Age: 25
End: 2025-03-10
Payer: COMMERCIAL

## 2025-03-10 VITALS
HEART RATE: 80 BPM | SYSTOLIC BLOOD PRESSURE: 117 MMHG | TEMPERATURE: 97.3 F | DIASTOLIC BLOOD PRESSURE: 69 MMHG | RESPIRATION RATE: 16 BRPM | OXYGEN SATURATION: 94 %

## 2025-03-10 DIAGNOSIS — G81.10 SPASTIC HEMIPLEGIA, UNSPECIFIED ETIOLOGY, UNSPECIFIED LATERALITY (H): ICD-10-CM

## 2025-03-10 DIAGNOSIS — D57.00 SICKLE CELL DISEASE WITH CRISIS (H): ICD-10-CM

## 2025-03-10 DIAGNOSIS — D57.00 SICKLE CELL PAIN CRISIS (H): Primary | ICD-10-CM

## 2025-03-10 DIAGNOSIS — D57.00 SICKLE CELL DISEASE WITH CRISIS (H): Primary | ICD-10-CM

## 2025-03-10 LAB
ALBUMIN SERPL BCG-MCNC: 4.3 G/DL (ref 3.5–5.2)
ALP SERPL-CCNC: 57 U/L (ref 40–150)
ALT SERPL W P-5'-P-CCNC: 27 U/L (ref 0–50)
ANION GAP SERPL CALCULATED.3IONS-SCNC: 10 MMOL/L (ref 7–15)
AST SERPL W P-5'-P-CCNC: 41 U/L (ref 0–45)
BASOPHILS # BLD AUTO: 0.3 10E3/UL (ref 0–0.2)
BASOPHILS NFR BLD AUTO: 2 %
BILIRUB SERPL-MCNC: 2.2 MG/DL
BUN SERPL-MCNC: 6.6 MG/DL (ref 6–20)
CALCIUM SERPL-MCNC: 8.9 MG/DL (ref 8.8–10.4)
CHLORIDE SERPL-SCNC: 107 MMOL/L (ref 98–107)
CREAT SERPL-MCNC: 0.54 MG/DL (ref 0.51–0.95)
EGFRCR SERPLBLD CKD-EPI 2021: >90 ML/MIN/1.73M2
EOSINOPHIL # BLD AUTO: 0.4 10E3/UL (ref 0–0.7)
EOSINOPHIL NFR BLD AUTO: 3 %
ERYTHROCYTE [DISTWIDTH] IN BLOOD BY AUTOMATED COUNT: 22 % (ref 10–15)
GLUCOSE SERPL-MCNC: 93 MG/DL (ref 70–99)
HCO3 SERPL-SCNC: 21 MMOL/L (ref 22–29)
HCT VFR BLD AUTO: 22.1 % (ref 35–47)
HGB BLD-MCNC: 7.5 G/DL (ref 11.7–15.7)
IMM GRANULOCYTES # BLD: 0.1 10E3/UL
IMM GRANULOCYTES NFR BLD: 0 %
LYMPHOCYTES # BLD AUTO: 2 10E3/UL (ref 0.8–5.3)
LYMPHOCYTES NFR BLD AUTO: 16 %
MCH RBC QN AUTO: 30.6 PG (ref 26.5–33)
MCHC RBC AUTO-ENTMCNC: 33.9 G/DL (ref 31.5–36.5)
MCV RBC AUTO: 90 FL (ref 78–100)
MONOCYTES # BLD AUTO: 0.8 10E3/UL (ref 0–1.3)
MONOCYTES NFR BLD AUTO: 6 %
NEUTROPHILS # BLD AUTO: 9.5 10E3/UL (ref 1.6–8.3)
NEUTROPHILS NFR BLD AUTO: 73 %
NRBC # BLD AUTO: 0.3 10E3/UL
NRBC BLD AUTO-RTO: 2 /100
PLATELET # BLD AUTO: 617 10E3/UL (ref 150–450)
POTASSIUM SERPL-SCNC: 3.7 MMOL/L (ref 3.4–5.3)
PROT SERPL-MCNC: 7.6 G/DL (ref 6.4–8.3)
RBC # BLD AUTO: 2.45 10E6/UL (ref 3.8–5.2)
RETICS # AUTO: 0.58 10E6/UL (ref 0.03–0.1)
RETICS/RBC NFR AUTO: 23.1 % (ref 0.5–2)
SODIUM SERPL-SCNC: 138 MMOL/L (ref 135–145)
WBC # BLD AUTO: 13 10E3/UL (ref 4–11)

## 2025-03-10 PROCEDURE — 96376 TX/PRO/DX INJ SAME DRUG ADON: CPT

## 2025-03-10 PROCEDURE — 96361 HYDRATE IV INFUSION ADD-ON: CPT

## 2025-03-10 PROCEDURE — 96374 THER/PROPH/DIAG INJ IV PUSH: CPT

## 2025-03-10 PROCEDURE — 250N000011 HC RX IP 250 OP 636: Performed by: PEDIATRICS

## 2025-03-10 PROCEDURE — 250N000013 HC RX MED GY IP 250 OP 250 PS 637: Performed by: PEDIATRICS

## 2025-03-10 PROCEDURE — 36415 COLL VENOUS BLD VENIPUNCTURE: CPT

## 2025-03-10 PROCEDURE — 85014 HEMATOCRIT: CPT

## 2025-03-10 PROCEDURE — 96375 TX/PRO/DX INJ NEW DRUG ADDON: CPT

## 2025-03-10 PROCEDURE — 82040 ASSAY OF SERUM ALBUMIN: CPT

## 2025-03-10 PROCEDURE — 258N000003 HC RX IP 258 OP 636: Performed by: PEDIATRICS

## 2025-03-10 PROCEDURE — 85045 AUTOMATED RETICULOCYTE COUNT: CPT

## 2025-03-10 PROCEDURE — 80053 COMPREHEN METABOLIC PANEL: CPT

## 2025-03-10 PROCEDURE — 85004 AUTOMATED DIFF WBC COUNT: CPT

## 2025-03-10 RX ORDER — ONDANSETRON 4 MG/1
8 TABLET, FILM COATED ORAL
Status: CANCELLED
Start: 2025-04-01

## 2025-03-10 RX ORDER — HEPARIN SODIUM (PORCINE) LOCK FLUSH IV SOLN 100 UNIT/ML 100 UNIT/ML
5 SOLUTION INTRAVENOUS
Status: DISCONTINUED | OUTPATIENT
Start: 2025-03-10 | End: 2025-03-10 | Stop reason: HOSPADM

## 2025-03-10 RX ORDER — ONDANSETRON 2 MG/ML
8 INJECTION INTRAMUSCULAR; INTRAVENOUS EVERY 6 HOURS PRN
Status: DISCONTINUED | OUTPATIENT
Start: 2025-03-10 | End: 2025-03-10 | Stop reason: HOSPADM

## 2025-03-10 RX ORDER — ONDANSETRON 2 MG/ML
8 INJECTION INTRAMUSCULAR; INTRAVENOUS EVERY 6 HOURS PRN
Status: CANCELLED
Start: 2025-04-01

## 2025-03-10 RX ORDER — DIPHENHYDRAMINE HCL 25 MG
50 CAPSULE ORAL
Status: COMPLETED | OUTPATIENT
Start: 2025-03-10 | End: 2025-03-10

## 2025-03-10 RX ORDER — KETOROLAC TROMETHAMINE 30 MG/ML
30 INJECTION, SOLUTION INTRAMUSCULAR; INTRAVENOUS ONCE
Status: COMPLETED | OUTPATIENT
Start: 2025-03-10 | End: 2025-03-10

## 2025-03-10 RX ORDER — DIPHENHYDRAMINE HCL 25 MG
50 CAPSULE ORAL
Status: CANCELLED
Start: 2025-04-01

## 2025-03-10 RX ORDER — KETOROLAC TROMETHAMINE 30 MG/ML
30 INJECTION, SOLUTION INTRAMUSCULAR; INTRAVENOUS ONCE
Status: CANCELLED
Start: 2025-04-01 | End: 2025-04-01

## 2025-03-10 RX ORDER — HEPARIN SODIUM,PORCINE 10 UNIT/ML
5 VIAL (ML) INTRAVENOUS
OUTPATIENT
Start: 2025-04-01

## 2025-03-10 RX ORDER — HEPARIN SODIUM (PORCINE) LOCK FLUSH IV SOLN 100 UNIT/ML 100 UNIT/ML
5 SOLUTION INTRAVENOUS
Status: CANCELLED | OUTPATIENT
Start: 2025-04-01

## 2025-03-10 RX ADMIN — HYDROMORPHONE HYDROCHLORIDE 1 MG: 1 INJECTION, SOLUTION INTRAMUSCULAR; INTRAVENOUS; SUBCUTANEOUS at 11:17

## 2025-03-10 RX ADMIN — KETOROLAC TROMETHAMINE 30 MG: 30 INJECTION, SOLUTION INTRAMUSCULAR; INTRAVENOUS at 09:19

## 2025-03-10 RX ADMIN — ONDANSETRON 8 MG: 2 INJECTION INTRAMUSCULAR; INTRAVENOUS at 09:22

## 2025-03-10 RX ADMIN — SODIUM CHLORIDE, SODIUM LACTATE, POTASSIUM CHLORIDE, AND CALCIUM CHLORIDE 1000 ML: .6; .31; .03; .02 INJECTION, SOLUTION INTRAVENOUS at 09:15

## 2025-03-10 RX ADMIN — HYDROMORPHONE HYDROCHLORIDE 1 MG: 1 INJECTION, SOLUTION INTRAMUSCULAR; INTRAVENOUS; SUBCUTANEOUS at 10:18

## 2025-03-10 RX ADMIN — HYDROMORPHONE HYDROCHLORIDE 1 MG: 1 INJECTION, SOLUTION INTRAMUSCULAR; INTRAVENOUS; SUBCUTANEOUS at 09:16

## 2025-03-10 RX ADMIN — HEPARIN 5 ML: 100 SYRINGE at 11:47

## 2025-03-10 RX ADMIN — DIPHENHYDRAMINE HYDROCHLORIDE 50 MG: 25 CAPSULE ORAL at 09:24

## 2025-03-10 NOTE — TELEPHONE ENCOUNTER
Available appt with [facility] at 0830. Spoke to infusion charge nurse Roz who states they will be able to draw labs at infusion center. Call placed to pt to verify she can make appt. Pt confirmed she is able to make appt but will need assistance with transportation.

## 2025-03-10 NOTE — TELEPHONE ENCOUNTER
Pt calling to report her ride was late and she is in route, will be to clinic at 8:50am. She wanted to update infusion. 0839 message sent to infusion.

## 2025-03-10 NOTE — PROGRESS NOTES
Infusion Nursing Note:  Jennifer Cervantes presents today for IV fluids, pain medication, antiemetics.    Patient seen by provider today: No   present during visit today: Not Applicable.    Note:   1L LR over 2 hours.  IV Dilaudid x3.  IV Toradol.  IV Zofran.  PO Benadryl.    Intravenous Access:  Implanted Port.    Treatment Conditions:  Not Applicable.    Post Infusion Assessment:  Patient tolerated infusion without incident.  Blood return noted pre and post infusion.  Site patent and intact, free from redness, edema or discomfort.  No evidence of extravasations.  Access discontinued per protocol.     Discharge Plan:   Patient and/or family verbalized understanding of discharge instructions and all questions answered.  AVS to patient via [employer].    Patient discharged in stable condition accompanied by: self.  Departure Mode: Ambulatory.    Administrations This Visit       diphenhydrAMINE (BENADRYL) capsule 50 mg       Admin Date  03/10/2025 Action  $Given Dose  50 mg Route  Oral Documented By  Echo Perez RN              heparin lock flush 100 unit/mL injection 5 mL       Admin Date  03/10/2025 Action  $Given Dose  5 mL Route  Intracatheter Documented By  Rin Mckeon RN              HYDROmorphone (DILAUDID) injection 1 mg       Admin Date  03/10/2025 Action  $Given Dose  1 mg Route  Intravenous Documented By  Echo Perez RN               Admin Date  03/10/2025 Action  $Given Dose  1 mg Route  Intravenous Documented By  Echo Perez RN               Admin Date  03/10/2025 Action  $Given Dose  1 mg Route  Intravenous Documented By  Echo Perez RN              ketorolac (TORADOL) injection 30 mg       Admin Date  03/10/2025 Action  $Given Dose  30 mg Route  Intravenous Documented By  Echo Perez RN              lactated ringers BOLUS 1,000 mL       Admin Date  03/10/2025 Action  $New Bag Dose  1,000 mL Rate  500 mL/hr Route  Intravenous Documented By  Echo Perez  RN              ondansetron (ZOFRAN) injection 8 mg       Admin Date  03/10/2025 Action  $Given Dose  8 mg Route  Intravenous Documented By  Echo Perez RN                  /85   Pulse 96   Resp 18   SpO2 95%     Echo Perez, RN

## 2025-03-10 NOTE — PROGRESS NOTES
Ambulatory Care Management- Transportation Support  Specialty SW - [facility]     Data/Intervention:  Patient Name:    Jennifer Cervantes     /Age: 2000 (25 year old)     [facility] team member assisting Specialty SW with transportation request.      Assessment:  CHW/CTA called [employer] to arrange medical appointment transportation in conjunction with patient's insurance.     Taxi company: [employer]    Return ride is scheduled for 12:00pm .     Plan:  Patient is aware of the transportation plan.  available to assist with any other needs.      Maritza Vick MA

## 2025-03-10 NOTE — TELEPHONE ENCOUNTER
"Oncology Nurse Triage - Sickle Cell Pain Crisis:    Situation: Jennifer  calling about Sickle Cell Pain Crisis, requesting to be added on for IV fluids and pain medicine    Background:     Patient's last infusion was 03/08/25  Last clinic visit date:02/27/25 w/Patricia Mantilla  Does patient have active treatment plan? Yes    Assessment of Symptoms:  Onset/Duration of symptoms: 1 day    Is it typical sickle cell pain? Yes  Location: Both arms and chest wall  Character: Sharp and Dull ache  Intensity: 7/10    Any radiation of pain, numbness, tingling, weakness, warmth, swelling, discoloration of arms or legs?No    Fever? No    Chest Pain Present: Yes, states she has been having chest pain on and off for a week now. Describes pain as located in \"chest wall.\"    Shortness of breath: No    Other home therapies tried: HEAT/HEATING PAD and WARM BATH     Last home medication taken and when: 0530 oxycodone    Any Refills Needed?: No    Does patient have transportation & length of time to get to clinic: Yes, if something is available early. Will need assistance with transportation if its later in the day.      Recommendations:   If you do not hear from the infusion center by 2pm then you will not be able to get in for an infusion today. If symptoms worsen while waiting for call back, and/or you experience fever, chills, SOB, chest pain, cough, n/v, dizziness, numbness, swelling, discoloration of extremities, then seek emergency evaluation in Emergency Department.     2725 Secure message to Patricia Mantilla    [id number] Patricia approving adding pt to wait list for IVF/pain meds today. Would like to add on labs today as well.           "

## 2025-03-12 ENCOUNTER — NURSE TRIAGE (OUTPATIENT)
Dept: ONCOLOGY | Facility: CLINIC | Age: 25
End: 2025-03-12

## 2025-03-12 ENCOUNTER — OFFICE VISIT (OUTPATIENT)
Dept: ORTHOPEDICS | Facility: CLINIC | Age: 25
End: 2025-03-12
Attending: EMERGENCY MEDICINE
Payer: COMMERCIAL

## 2025-03-12 DIAGNOSIS — I69.351 HEMIPLEGIA AND HEMIPARESIS FOLLOWING CEREBRAL INFARCTION AFFECTING RIGHT DOMINANT SIDE (H): ICD-10-CM

## 2025-03-12 DIAGNOSIS — M25.562 PATELLOFEMORAL ARTHRALGIA OF LEFT KNEE: Primary | ICD-10-CM

## 2025-03-12 DIAGNOSIS — D57.1 SICKLE CELL DISEASE WITHOUT CRISIS (H): ICD-10-CM

## 2025-03-12 PROCEDURE — 99213 OFFICE O/P EST LOW 20 MIN: CPT | Performed by: FAMILY MEDICINE

## 2025-03-12 PROCEDURE — 1125F AMNT PAIN NOTED PAIN PRSNT: CPT | Performed by: FAMILY MEDICINE

## 2025-03-12 NOTE — TELEPHONE ENCOUNTER
Oncology Nurse Triage - Sickle Cell Pain Crisis:  Situation: Jennifer  calling about Sickle Cell Pain Crisis, requesting to be added on for IV fluids and pain medicine    Background:   Patient's last infusion was 3/10/25  Last clinic visit date:2/27/25 with Patricia Mantilla CNP  Does patient have active treatment plan? Yes    Assessment of Symptoms:  Onset/Duration of symptoms: 1 day    Is it typical sickle cell pain? Yes  Location: all over  Character: Sharp  Intensity: 8/10    Any radiation of pain, numbness, tingling, weakness, warmth, swelling, discoloration of arms or legs?No    Fever? No  (if yes max temperature recorded in last 24 hours):      Chest Pain Present: No    Shortness of breath: No    Other home therapies tried: HEAT/HEATING PAD and WARM BATH     Last home medication taken and when: oxycodone about 0600    Any Refills Needed?: No    Does patient have transportation & length of time to get to clinic: Yes; pt has apt at 0940 today; would like apt for IVF/pm afterwards    Recommendations:   If you do not hear from the infusion center by 2pm then you will not be able to get in for an infusion today. If symptoms worsen while waiting for call back, and/or you experience fever, chills, SOB, chest pain, cough, n/v, dizziness, numbness, swelling, discoloration of extremities, then seek emergency evaluation in Emergency Department.     Please note, if you are late for your appt, you risk losing your infusion appt as it may delay another patient's infusion who arrived on time.

## 2025-03-12 NOTE — TELEPHONE ENCOUNTER
"[facility]: Hematology                                                                                        On 2/27/25  met with patient while in infusion.  She has been doing well since I last saw her during visit for botox injections. We discussed events of that appointment and how thankful and appreciative she was to have me there.   We reviewed we have rescheduled to a future date.      She asks to have her hand brace looked at as it is broken.  She believes it is about 5 years old and was made with [employer].   Will place new order for brace to be either repaired or made new.    She also asked about getting and endoscopy completed. This has been discussed a while ago and scheduled a few times and then cancelled last minute due to her family not being able to have an adult pick her up from appointment.  It is required to have a \" or adult\" to leave with the patient due to sedation.   Her family has cancelled on her both times.  She would like to see if she could be admitted after the scope so this need would be eliminated.    She does have an appointment with her Primary care on 3/13/25 and I asked her to discuss getting a new order or referral to GI for consult or the procedure.        Evaluation of Learning  Patient Education Provided: Yes  Readiness:: Acceptance  Method:: Explanation  Response:: Verbalizes understanding           Mandie Graham LPN  Hematology Care Coordination  [phone]  "

## 2025-03-12 NOTE — TELEPHONE ENCOUNTER
Jennifer is calling to see if there are any apts available for tomorrow or if she will have to call in tomorrow. This writer explained that it is unlikely that she will get in today d/t the infusion centers being very busy, and no openings have been available today. Pt voiced understanding and states she plans to call in tomorrow.

## 2025-03-12 NOTE — LETTER
"  3/12/2025      RE: Jennifer Cervantes  [address]  [facility] [address]     Dear Colleague,    Thank you for referring your patient, Jennifer Cervantes, to the [facility] [place]. Please see a copy of my visit note below.        Patient history of sickle cell disease. Patient reports that she's had left knee pain for several months without injury or change in activity. The pain is located over the anterior knee. She does feel that she has some left knee swelling. Denies any specific pattern that increases her pain, she notes random times that it bothers her. She notes instability, feels that the left knee will give out on her. She had a recent MRI on 2/27/25 that was ordered by her PCP.  Difficult to kneel on the knee.  Episodes of knee \"locking\".  Inability to walk and subsequent ER visit 3/8/2025.  In emergency room hemoglobin 6.6, white blood cell count 12.4, reticulocyte count 21%.  Patient proceeded with pain protocol for sickle cell pain crisis.  Patient has Port-A-Cath IV access.    Pt prescribed diclofenac gel for pes anserine bursitis.    MRI left knee 2/27/2025 consistent with intrasubstance degeneration versus contusion of posterior horn of medial meniscus without coby tear of the medial meniscus.  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  Suggestion of pes anserine bursitis.  Intact cruciate ligaments.  Intact lateral meniscus.  No degenerative change in patellofemoral compartment    Ultrasound left lower extremity 2/8/2025 showed no evidence of DVT or popliteal cyst.    Patient past history of cerebral infarction with right-sided hemiplegia deficits.  Patient indicates that she has gone through a number of different right-sided lower extremity AFOs, in order to find one that does not bother her skin.  She currently has 1 that is effective but she is not wearing it at this time.            MR left knee without contrast 2/27/2025 6:33 AM   "   Techniques: Multiplanar multisequence imaging of the left knee was  obtained without administration of intra-articular or intravenous  contrast using routine protocol.     History: partial meniscal tear versus other bone abnormality; Acute  pain of left knee; Meniscal injury, left, initial encounter; Lesion of  bone of knee     Additional history from EMR: Problem list with iron overload due to  repeated red blood cell transfusion.     Comparison: Radiograph 2/4/2025     Findings:     MENISCI:  Medial meniscus: Intact. Intrasubstance signal without violation to  articular surface within the posterior horn of medial meniscus.  Lateral meniscus: Intact.     LIGAMENTS  Cruciate ligaments: Intact. Thickening of the tibial collateral  ligament, likely sequelae of remote trauma.  Medial supporting structures: Intact.  Lateral supporting structures: Intact.     EXTENSOR MECHANISM  Intact. Mild patellar tendinosis.     FLUID  Small joint effusion. No substantial Baker's cyst. Trace pes anserine  bursal effusion.     OSSEOUS and ARTICULAR STRUCTURES  Bones: No fracture or contusion. Extensive T1 and T2 hypointense mild  signal alteration with degree of T1 hypointensity more than regional  muscle with relative sparing of the epiphysis.     Patellofemoral compartment: No hyaline cartilage disease.     Medial compartment: No hyaline cartilage disease.     Lateral compartment: Moderate chondral loss at the central region of  lateral tibial plateau without subchondral osseous of mammography.     ANCILLARY FINDINGS  None.                                                                      Impression:  1. Intrasubstance signal without violation to articular surface within  the posterior horn of medial meniscus. Finding maybe due to meniscal  contusion vs. intrasubstance degeneration.  2. Query mild pes anserine bursitis.  3. Focal grade 3 chondromalacia in the lateral compartment.  4. Diffuse marrow signal alteration, likely  related to repeated blood  transfusion (i.e. hemosiderosis) in the current clinical context.      I have personally reviewed the examination and initial interpretation  and I agree with the findings.     MIGUEL REYESASHI           4 views left knee radiographs 2/5/2025 9:18 AM     History: Acute pain of left knee     Comparison: None available     Findings:     AP and lateral views of the left knee were obtained.      No acute osseous abnormality.  No joint effusion.     No substantial degenerative change.     No patellar tilt or lateral subluxation.  Soft tissue is unremarkable.                                                                      Impression:  1. No acute osseous abnormality.  2. No substantial degenerative change.     JUTTA ELLERMANN, MD             PMH:  Past Medical History:   Diagnosis Date     Anxiety      Bleeding disorder      Blood clotting disorder      Cerebral infarction (H) 2015     Cognitive developmental delay     low IQ. Please recognize when managing pain and planning with her     Depressive disorder      Hemiplegia and hemiparesis following cerebral infarction affecting right dominant side (H)     right hand contractures     Iron overload due to repeated red blood cell transfusions      Migraines      Multiple subsegmental pulmonary emboli without acute cor pulmonale (H) 02/01/2021     Oppositional defiant behavior      Presence of intrauterine contraceptive device 2/18/2020     Superficial venous thrombosis of arm, right 03/25/2021     Uncomplicated asthma        Active problem list:  Patient Active Problem List   Diagnosis     Moderate persistent asthma without complication     Constipation     Hemiplegia and hemiparesis following cerebral infarction affecting right dominant side (H)     Iron overload due to repeated red blood cell transfusions     Hemochromatosis due to repeated red blood cell transfusions     Cognitive developmental delay     Oppositional defiant behavior      Cerebral infarction (H)     Sickle cell pain crisis (H)     Generalized anxiety disorder with panic attacks     Overweight     Migraine headache     Hypoxemia     Hydrosalpinx     H/O: stroke     Gastritis     Depressive disorder     Other chronic pain     Allergic reaction     Acute pain     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)     History of stroke     Sickle cell crisis (H)     Multiple subsegmental pulmonary emboli without acute cor pulmonale (H)     Atypical squamous cells of undetermined significance on cytologic smear of cervix (ASC-US)     Nexplanon in place     Superficial venous thrombosis of arm, right     Spasticity     Spastic hemiplegia (H)     Feeling angry     Transient ischemic attack     Uses contraception     Pulmonary embolism, other, unspecified chronicity, unspecified whether acute cor pulmonale present (H)     Chronic pulmonary embolism without acute cor pulmonale, unspecified pulmonary embolism type (H)     Pleuritic chest pain     Subtherapeutic anticoagulation     Chest pain, unspecified type     Cerebral infarction, unspecified mechanism (H)     Abdominal discomfort     Dyspnea on exertion     Hypoxia     Vaginal bleeding     Anemia, unspecified type     Exposure to blood or body fluid     Acute pain of left knee     Meniscal injury, left, initial encounter       FH:  Family History   Problem Relation Age of Onset     Sickle Cell Trait Mother      Hypertension Mother      Asthma Mother      Sickle Cell Trait Father      Glaucoma No family hx of      Macular Degeneration No family hx of      Diabetes No family hx of      Gout No family hx of        SH:  Social History     Socioeconomic History     Marital status: Single     Spouse name: Not on file     Number of children: Not on file     Years of education: Not on file     Highest education level: Not on file   Occupational History     Not on file   Tobacco Use     Smoking status: Never     Passive exposure: Never     Smokeless tobacco: Never  "  Substance and Sexual Activity     Alcohol use: Not Currently     Alcohol/week: 0.0 standard drinks of alcohol     Drug use: Never     Sexual activity: Not Currently     Partners: Male     Birth control/protection: Implant   Other Topics Concern     Parent/sibling w/ CABG, MI or angioplasty before 65F 55M? Not Asked   Social History Narrative    Lives with mom and extended family (mom is her PCA and ILS worker) but \"toxic environment\" per her report. As of 9/28/2022 she is planning to move out at some point soon. She has minimal support at home despite her significant SCD comorbidities and cognitive delay from stroke.     Social Drivers of Health     Financial Resource Strain: Low Risk  (12/7/2024)    Financial Resource Strain      Within the past 12 months, have you or your family members you live with been unable to get utilities (heat, electricity) when it was really needed?: No   Food Insecurity: Low Risk  (12/7/2024)    Food Insecurity      Within the past 12 months, did you worry that your food would run out before you got money to buy more?: No      Within the past 12 months, did the food you bought just not last and you didn t have money to get more?: No   Transportation Needs: Low Risk  (12/7/2024)    Transportation Needs      Within the past 12 months, has lack of transportation kept you from medical appointments, getting your medicines, non-medical meetings or appointments, work, or from getting things that you need?: No   Physical Activity: Unknown (7/3/2024)    Exercise Vital Sign      Days of Exercise per Week: 1 day      Minutes of Exercise per Session: Not on file   Stress: No Stress Concern Present (7/3/2024)    [employer] - Occupational Stress Questionnaire      Feeling of Stress : Only a little   Social Connections: Unknown (7/3/2024)    Social Connection and Isolation Panel [NHANES]      Frequency of Communication with Friends and Family: Not on file      Frequency " of Social Gatherings with Friends and Family: Never      Attends [employer] Services: Not on file      Active Member of Clubs or Organizations: Not on file      Attends Club or Organization Meetings: Not on file      Marital Status: Not on file   Interpersonal Safety: Low Risk  (2/12/2025)    Interpersonal Safety      Do you feel physically and emotionally safe where you currently live?: Yes      Within the past 12 months, have you been hit, slapped, kicked or otherwise physically hurt by someone?: No      Within the past 12 months, have you been humiliated or emotionally abused in other ways by your partner or ex-partner?: No   Housing Stability: Low Risk  (12/7/2024)    Housing Stability      Do you have housing? : Yes      Are you worried about losing your housing?: No   Recent Concern: Housing Stability - High Risk (11/28/2024)    Housing Stability      Do you have housing? : No      Are you worried about losing your housing?: No       MEDS:  See EMR, reviewed  ALL:  See EMR, reviewed    REVIEW OF SYSTEMS:  CONSTITUTIONAL:NEGATIVE for fever, chills, change in weight  INTEGUMENTARY/SKIN: NEGATIVE for worrisome rashes, moles or lesions  EYES: NEGATIVE for vision changes or irritation  ENT/MOUTH: NEGATIVE for ear, mouth and throat problems  RESP:NEGATIVE for significant cough or SOB  BREAST: NEGATIVE for masses, tenderness or discharge  GI: NEGATIVE for nausea, abdominal pain, heartburn, or change in bowel habits  :NEGATIVE for frequency, dysuria, or hematuria  :NEGATIVE for frequency, dysuria, or hematuria  NEURO: NEGATIVE for weakness, dizziness or paresthesias  ENDOCRINE: NEGATIVE for temperature intolerance, skin/hair changes  HEME/ALLERGY/IMMUNE: NEGATIVE for bleeding problems        Objective: Patient walks with a circumducting gait due to her right lower extremity strength deficits and foot drop.  She has a right-sided foot drop on physical exam.  She does have 4+ out of 5 right lower extremity hip  flexion, extension, knee flexion, knee extension.  On the left lower extremity she can do an active straight leg raise with no extension lag.  There is no knee effusion.  She is tender over the lateral patellar facet.  She has increased medial patellar excursion compared to lateral patellar excursion on the left.  Nontender over the medial patellar facet.  Nontender over the medial joint line or lateral joint line of the left knee.  Nontender over the pes anserine bursa on the left.  Nontender over the distal IT band.  No swelling the popliteal space or tenderness in the calf.  I can flex and extend the knee fully.  Anterior and posterior drawers negative.  Appropriate in conversation and affect.    I personally discussed with the patient her MRI results of the knee in detail.      Assessment left-sided knee discomfort, suspect patellofemoral discomfort.  Mild left-sided knee tibiofemoral chondromalacia on MRI.  No signs of left knee internal derangement on MRI.  No signs of clinical pes anserine bursitis on clinical exam today.  Right right-sided hemiplegia with right-sided foot drop    Plan: Patient is currently seeing physical therapy to work on pelvic femoral alignment.  Physical therapy could also try left-sided knee taping.  I encouraged her to wear her right lower extremity AFO; we discussed how this could improve her gait which might be helpful for her left-sided knee.  If physical therapy is not helpful over the next 6 to 8 weeks she can return to see me in clinic.  If necessary an empiric cortisone shot could be considered, or Synvisc injection,.                                    Again, thank you for allowing me to participate in the care of your patient.      Sincerely,    Alexy Navarrete MD

## 2025-03-12 NOTE — TELEPHONE ENCOUNTER
Pt calling to check on status of wait list. Informed pt no time available currently. Pt will need assistance with coordinating transportation if something does open up later.

## 2025-03-12 NOTE — PROGRESS NOTES
"    Patient history of sickle cell disease. Patient reports that she's had left knee pain for several months without injury or change in activity. The pain is located over the anterior knee. She does feel that she has some left knee swelling. Denies any specific pattern that increases her pain, she notes random times that it bothers her. She notes instability, feels that the left knee will give out on her. She had a recent MRI on 2/27/25 that was ordered by her PCP.  Difficult to kneel on the knee.  Episodes of knee \"locking\".  Inability to walk and subsequent ER visit 3/8/2025.  In emergency room hemoglobin 6.6, white blood cell count 12.4, reticulocyte count 21%.  Patient proceeded with pain protocol for sickle cell pain crisis.  Patient has Port-A-Cath IV access.    Pt prescribed diclofenac gel for pes anserine bursitis.    MRI left knee 2/27/2025 consistent with intrasubstance degeneration versus contusion of posterior horn of medial meniscus without coby tear of the medial meniscus.  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  Suggestion of pes anserine bursitis.  Intact cruciate ligaments.  Intact lateral meniscus.  No degenerative change in patellofemoral compartment    Ultrasound left lower extremity 2/8/2025 showed no evidence of DVT or popliteal cyst.    Patient past history of cerebral infarction with right-sided hemiplegia deficits.  Patient indicates that she has gone through a number of different right-sided lower extremity AFOs, in order to find one that does not bother her skin.  She currently has 1 that is effective but she is not wearing it at this time.            MR left knee without contrast 2/27/2025 6:33 AM     Techniques: Multiplanar multisequence imaging of the left knee was  obtained without administration of intra-articular or intravenous  contrast using routine protocol.     History: partial meniscal tear versus other bone abnormality; Acute  pain of left knee; " Meniscal injury, left, initial encounter; Lesion of  bone of knee     Additional history from EMR: Problem list with iron overload due to  repeated red blood cell transfusion.     Comparison: Radiograph 2/4/2025     Findings:     MENISCI:  Medial meniscus: Intact. Intrasubstance signal without violation to  articular surface within the posterior horn of medial meniscus.  Lateral meniscus: Intact.     LIGAMENTS  Cruciate ligaments: Intact. Thickening of the tibial collateral  ligament, likely sequelae of remote trauma.  Medial supporting structures: Intact.  Lateral supporting structures: Intact.     EXTENSOR MECHANISM  Intact. Mild patellar tendinosis.     FLUID  Small joint effusion. No substantial Baker's cyst. Trace pes anserine  bursal effusion.     OSSEOUS and ARTICULAR STRUCTURES  Bones: No fracture or contusion. Extensive T1 and T2 hypointense mild  signal alteration with degree of T1 hypointensity more than regional  muscle with relative sparing of the epiphysis.     Patellofemoral compartment: No hyaline cartilage disease.     Medial compartment: No hyaline cartilage disease.     Lateral compartment: Moderate chondral loss at the central region of  lateral tibial plateau without subchondral osseous of mammography.     ANCILLARY FINDINGS  None.                                                                      Impression:  1. Intrasubstance signal without violation to articular surface within  the posterior horn of medial meniscus. Finding maybe due to meniscal  contusion vs. intrasubstance degeneration.  2. Query mild pes anserine bursitis.  3. Focal grade 3 chondromalacia in the lateral compartment.  4. Diffuse marrow signal alteration, likely related to repeated blood  transfusion (i.e. hemosiderosis) in the current clinical context.      I have personally reviewed the examination and initial interpretation  and I agree with the findings.     MIGUEL PRATT           4 views left knee radiographs  2/5/2025 9:18 AM     History: Acute pain of left knee     Comparison: None available     Findings:     AP and lateral views of the left knee were obtained.      No acute osseous abnormality.  No joint effusion.     No substantial degenerative change.     No patellar tilt or lateral subluxation.  Soft tissue is unremarkable.                                                                      Impression:  1. No acute osseous abnormality.  2. No substantial degenerative change.     JUTTA ELLERMANN, MD             PMH:  Past Medical History:   Diagnosis Date    Anxiety     Bleeding disorder     Blood clotting disorder     Cerebral infarction (H) 2015    Cognitive developmental delay     low IQ. 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 side (H)     right hand contractures    Iron overload due to repeated red blood cell transfusions     Migraines     Multiple subsegmental pulmonary emboli without acute cor pulmonale (H) 02/01/2021    Oppositional defiant behavior     Presence of intrauterine contraceptive device 2/18/2020    Superficial venous thrombosis of arm, right 03/25/2021    Uncomplicated asthma        Active problem list:  Patient Active Problem List   Diagnosis    Moderate persistent asthma without complication    Constipation    Hemiplegia and hemiparesis following cerebral infarction affecting right dominant side (H)    Iron overload due to repeated red blood cell transfusions    Hemochromatosis due to repeated red blood cell transfusions    Cognitive developmental delay    Oppositional defiant behavior    Cerebral infarction (H)    Sickle cell pain crisis (H)    Generalized anxiety disorder with panic attacks    Overweight    Migraine headache    Hypoxemia    Hydrosalpinx    H/O: stroke    Gastritis    Depressive disorder    Other chronic pain    Allergic reaction    Acute pain     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)    History of stroke    Sickle cell crisis (H)    Multiple subsegmental pulmonary emboli without acute cor pulmonale (H)    Atypical squamous cells of undetermined significance on cytologic smear of cervix (ASC-US)    Nexplanon in place    Superficial venous thrombosis of arm, right    Spasticity    Spastic hemiplegia (H)    Feeling angry    Transient ischemic attack    Uses contraception    Pulmonary embolism, other, unspecified chronicity, unspecified whether acute cor pulmonale present (H)    Chronic pulmonary embolism without acute cor pulmonale, unspecified pulmonary embolism type (H)    Pleuritic chest pain    Subtherapeutic anticoagulation    Chest pain, unspecified type    Cerebral infarction, unspecified mechanism (H)    Abdominal discomfort    Dyspnea on exertion    Hypoxia    Vaginal bleeding    Anemia, unspecified type    Exposure to blood or body fluid    Acute pain of left knee    Meniscal injury, left, initial encounter       FH:  Family History   Problem Relation Age of Onset    Sickle Cell Trait Mother     Hypertension Mother     Asthma Mother     Sickle Cell Trait Father     Glaucoma No family hx of     Macular Degeneration No family hx of     Diabetes No family hx of     Gout No family hx of        SH:  Social History     Socioeconomic History    Marital status: Single     Spouse name: Not on file    Number of children: Not on file    Years of education: Not on file    Highest education level: Not on file   Occupational History    Not on file   Tobacco Use    Smoking status: Never     Passive exposure: Never    Smokeless tobacco: Never   Substance and Sexual Activity    Alcohol use: Not Currently     Alcohol/week: 0.0 standard drinks of alcohol    Drug use: Never    Sexual activity: Not Currently     Partners: Male     Birth control/protection: Implant   Other Topics Concern    Parent/sibling w/ CABG, MI or angioplasty before 65F 55M? Not Asked   Social History  "Narrative    Lives with mom and extended family (mom is her PCA and ILS worker) but \"toxic environment\" per her report. As of 9/28/2022 she is planning to move out at some point soon. She has minimal support at home despite her significant SCD comorbidities and cognitive delay from stroke.     Social Drivers of Health     Financial Resource Strain: Low Risk  (12/7/2024)    Financial Resource Strain     Within the past 12 months, have you or your family members you live with been unable to get utilities (heat, electricity) when it was really needed?: No   Food Insecurity: Low Risk  (12/7/2024)    Food Insecurity     Within the past 12 months, did you worry that your food would run out before you got money to buy more?: No     Within the past 12 months, did the food you bought just not last and you didn t have money to get more?: No   Transportation Needs: Low Risk  (12/7/2024)    Transportation Needs     Within the past 12 months, has lack of transportation kept you from medical appointments, getting your medicines, non-medical meetings or appointments, work, or from getting things that you need?: No   Physical Activity: Unknown (7/3/2024)    Exercise Vital Sign     Days of Exercise per Week: 1 day     Minutes of Exercise per Session: Not on file   Stress: No Stress Concern Present (7/3/2024)    [employer] - Occupational Stress Questionnaire     Feeling of Stress : Only a little   Social Connections: Unknown (7/3/2024)    Social Connection and Isolation Panel [NHANES]     Frequency of Communication with Friends and Family: Not on file     Frequency of Social Gatherings with Friends and Family: Never     Attends [employer] Services: Not on file     Active Member of Clubs or Organizations: Not on file     Attends Club or Organization Meetings: Not on file     Marital Status: Not on file   Interpersonal Safety: Low Risk  (2/12/2025)    Interpersonal Safety     Do you feel physically and " emotionally safe where you currently live?: Yes     Within the past 12 months, have you been hit, slapped, kicked or otherwise physically hurt by someone?: No     Within the past 12 months, have you been humiliated or emotionally abused in other ways by your partner or ex-partner?: No   Housing Stability: Low Risk  (12/7/2024)    Housing Stability     Do you have housing? : Yes     Are you worried about losing your housing?: No   Recent Concern: Housing Stability - High Risk (11/28/2024)    Housing Stability     Do you have housing? : No     Are you worried about losing your housing?: No       MEDS:  See EMR, reviewed  ALL:  See EMR, reviewed    REVIEW OF SYSTEMS:  CONSTITUTIONAL:NEGATIVE for fever, chills, change in weight  INTEGUMENTARY/SKIN: NEGATIVE for worrisome rashes, moles or lesions  EYES: NEGATIVE for vision changes or irritation  ENT/MOUTH: NEGATIVE for ear, mouth and throat problems  RESP:NEGATIVE for significant cough or SOB  BREAST: NEGATIVE for masses, tenderness or discharge  GI: NEGATIVE for nausea, abdominal pain, heartburn, or change in bowel habits  :NEGATIVE for frequency, dysuria, or hematuria  :NEGATIVE for frequency, dysuria, or hematuria  NEURO: NEGATIVE for weakness, dizziness or paresthesias  ENDOCRINE: NEGATIVE for temperature intolerance, skin/hair changes  HEME/ALLERGY/IMMUNE: NEGATIVE for bleeding problems        Objective: Patient walks with a circumducting gait due to her right lower extremity strength deficits and foot drop.  She has a right-sided foot drop on physical exam.  She does have 4+ out of 5 right lower extremity hip flexion, extension, knee flexion, knee extension.  On the left lower extremity she can do an active straight leg raise with no extension lag.  There is no knee effusion.  She is tender over the lateral patellar facet.  She has increased medial patellar excursion compared to lateral patellar excursion on the left.  Nontender over the medial patellar facet.   Nontender over the medial joint line or lateral joint line of the left knee.  Nontender over the pes anserine bursa on the left.  Nontender over the distal IT band.  No swelling the popliteal space or tenderness in the calf.  I can flex and extend the knee fully.  Anterior and posterior drawers negative.  Appropriate in conversation and affect.    I personally discussed with the patient her MRI results of the knee in detail.      Assessment left-sided knee discomfort, suspect patellofemoral discomfort.  Mild left-sided knee tibiofemoral chondromalacia on MRI.  No signs of left knee internal derangement on MRI.  No signs of clinical pes anserine bursitis on clinical exam today.  Right right-sided hemiplegia with right-sided foot drop    Plan: Patient is currently seeing physical therapy to work on pelvic femoral alignment.  Physical therapy could also try left-sided knee taping.  I encouraged her to wear her right lower extremity AFO; we discussed how this could improve her gait which might be helpful for her left-sided knee.  If physical therapy is not helpful over the next 6 to 8 weeks she can return to see me in clinic.  If necessary an empiric cortisone shot could be considered, or Synvisc injection,.

## 2025-03-13 ENCOUNTER — NURSE TRIAGE (OUTPATIENT)
Dept: ONCOLOGY | Facility: CLINIC | Age: 25
End: 2025-03-13

## 2025-03-13 ENCOUNTER — PATIENT OUTREACH (OUTPATIENT)
Dept: CASE MANAGEMENT | Facility: CLINIC | Age: 25
End: 2025-03-13

## 2025-03-13 ENCOUNTER — INFUSION THERAPY VISIT (OUTPATIENT)
Dept: TRANSPLANT | Facility: CLINIC | Age: 25
End: 2025-03-13
Attending: PEDIATRICS
Payer: COMMERCIAL

## 2025-03-13 VITALS
HEART RATE: 96 BPM | RESPIRATION RATE: 16 BRPM | DIASTOLIC BLOOD PRESSURE: 73 MMHG | SYSTOLIC BLOOD PRESSURE: 129 MMHG | OXYGEN SATURATION: 94 %

## 2025-03-13 DIAGNOSIS — G81.10 SPASTIC HEMIPLEGIA, UNSPECIFIED ETIOLOGY, UNSPECIFIED LATERALITY (H): ICD-10-CM

## 2025-03-13 DIAGNOSIS — D57.00 SICKLE CELL PAIN CRISIS (H): Primary | ICD-10-CM

## 2025-03-13 PROCEDURE — 250N000011 HC RX IP 250 OP 636: Performed by: PEDIATRICS

## 2025-03-13 PROCEDURE — 250N000013 HC RX MED GY IP 250 OP 250 PS 637: Performed by: PEDIATRICS

## 2025-03-13 PROCEDURE — 258N000003 HC RX IP 258 OP 636: Performed by: PEDIATRICS

## 2025-03-13 RX ORDER — ONDANSETRON 8 MG/1
8 TABLET, ORALLY DISINTEGRATING ORAL ONCE
Status: DISCONTINUED | OUTPATIENT
Start: 2025-03-13 | End: 2025-03-13 | Stop reason: HOSPADM

## 2025-03-13 RX ORDER — HEPARIN SODIUM (PORCINE) LOCK FLUSH IV SOLN 100 UNIT/ML 100 UNIT/ML
5 SOLUTION INTRAVENOUS
Status: DISCONTINUED | OUTPATIENT
Start: 2025-03-13 | End: 2025-03-13 | Stop reason: HOSPADM

## 2025-03-13 RX ORDER — KETOROLAC TROMETHAMINE 30 MG/ML
30 INJECTION, SOLUTION INTRAMUSCULAR; INTRAVENOUS ONCE
Start: 2025-04-01 | End: 2025-04-01

## 2025-03-13 RX ORDER — ONDANSETRON 4 MG/1
8 TABLET, FILM COATED ORAL
Start: 2025-04-01

## 2025-03-13 RX ORDER — DIPHENHYDRAMINE HCL 25 MG
50 CAPSULE ORAL
Status: COMPLETED | OUTPATIENT
Start: 2025-03-13 | End: 2025-03-13

## 2025-03-13 RX ORDER — DIPHENHYDRAMINE HCL 25 MG
50 CAPSULE ORAL
Start: 2025-04-01

## 2025-03-13 RX ORDER — HEPARIN SODIUM (PORCINE) LOCK FLUSH IV SOLN 100 UNIT/ML 100 UNIT/ML
5 SOLUTION INTRAVENOUS
OUTPATIENT
Start: 2025-04-01

## 2025-03-13 RX ORDER — ONDANSETRON 4 MG/1
8 TABLET, FILM COATED ORAL
Status: DISCONTINUED | OUTPATIENT
Start: 2025-03-13 | End: 2025-03-13

## 2025-03-13 RX ORDER — HEPARIN SODIUM,PORCINE 10 UNIT/ML
5 VIAL (ML) INTRAVENOUS
OUTPATIENT
Start: 2025-04-01

## 2025-03-13 RX ORDER — ONDANSETRON 2 MG/ML
8 INJECTION INTRAMUSCULAR; INTRAVENOUS EVERY 6 HOURS PRN
Status: DISCONTINUED | OUTPATIENT
Start: 2025-03-13 | End: 2025-03-13 | Stop reason: HOSPADM

## 2025-03-13 RX ORDER — ONDANSETRON 2 MG/ML
8 INJECTION INTRAMUSCULAR; INTRAVENOUS EVERY 6 HOURS PRN
Start: 2025-04-01

## 2025-03-13 RX ORDER — KETOROLAC TROMETHAMINE 30 MG/ML
30 INJECTION, SOLUTION INTRAMUSCULAR; INTRAVENOUS ONCE
Status: COMPLETED | OUTPATIENT
Start: 2025-03-13 | End: 2025-03-13

## 2025-03-13 RX ADMIN — KETOROLAC TROMETHAMINE 30 MG: 30 INJECTION, SOLUTION INTRAMUSCULAR; INTRAVENOUS at 09:24

## 2025-03-13 RX ADMIN — Medication 5 ML: at 12:14

## 2025-03-13 RX ADMIN — HYDROMORPHONE HYDROCHLORIDE 1 MG: 1 INJECTION, SOLUTION INTRAMUSCULAR; INTRAVENOUS; SUBCUTANEOUS at 10:20

## 2025-03-13 RX ADMIN — SODIUM CHLORIDE, POTASSIUM CHLORIDE, SODIUM LACTATE AND CALCIUM CHLORIDE 1000 ML: 600; 310; 30; 20 INJECTION, SOLUTION INTRAVENOUS at 09:52

## 2025-03-13 RX ADMIN — HYDROMORPHONE HYDROCHLORIDE 1 MG: 1 INJECTION, SOLUTION INTRAMUSCULAR; INTRAVENOUS; SUBCUTANEOUS at 11:21

## 2025-03-13 RX ADMIN — DIPHENHYDRAMINE HYDROCHLORIDE 50 MG: 25 CAPSULE ORAL at 09:38

## 2025-03-13 RX ADMIN — HYDROMORPHONE HYDROCHLORIDE 1 MG: 1 INJECTION, SOLUTION INTRAMUSCULAR; INTRAVENOUS; SUBCUTANEOUS at 09:23

## 2025-03-13 RX ADMIN — ONDANSETRON 8 MG: 2 INJECTION INTRAMUSCULAR; INTRAVENOUS at 09:40

## 2025-03-13 NOTE — TELEPHONE ENCOUNTER
Pt calling to request assistance coordinating ride home from infusion today. Informed pt writer would send message to SW to assist with this. Pt verbalized understanding.   Message sent to SW to help with transportation.

## 2025-03-13 NOTE — PROGRESS NOTES
Ambulatory Care Management- Transportation Support  Specialty SW - [facility]     Data/Intervention:  Patient Name:    Jennifer Cervantes DOB/Age: 2000 (25 year old)     [facility] team member assisting Specialty SW with transportation request.      Assessment:  CHW/CTA called [facility] to arrange medical appointment transportation in conjunction with patient's insurance.     Taxi company: [employer]    Patient will need to call above taxi company at phone number: [phone] when ready for return ride home.      Plan:  Patient is aware of the transportation plan.  available to assist with any other needs.      Lisandra Kilgore MA

## 2025-03-13 NOTE — PROGRESS NOTES
Infusion Nursing Note:  Jennifer Cervantes presents today for IVF and pain meds.    Patient seen by provider today: No   present during visit today: Not Applicable.    Note: Pt came in today with pain due to sickle cell. Pt stated feeling fatigued and slightly nauseated but reported this is typically baseline. Pt received dilaudid, Toradol, Zofran, benadryl, IVF with good results. Pt was de accessed and discharged home.       Intravenous Access:  Implanted Port.    Treatment Conditions:  Not Applicable.      Post Infusion Assessment:  Patient tolerated infusion without incident.       Discharge Plan:   Patient discharged in stable condition accompanied by: self.      Nicolasa Loyola RN

## 2025-03-13 NOTE — TELEPHONE ENCOUNTER
Oncology Nurse Triage - Sickle Cell Pain Crisis:  Situation: Jennifer  calling about Sickle Cell Pain Crisis, requesting to be added on for IV fluids and pain medicine    Background:   Patient's last infusion was 3/10/25  Last clinic visit date:2/27/25 with Patricia Mantilla CNP  Does patient have active treatment plan? Yes    Assessment of Symptoms:  Onset/Duration of symptoms: 2 day    Is it typical sickle cell pain? Yes  Location: l arm and lower back  Character: Sharp  Intensity: 8/10    Any radiation of pain, numbness, tingling, weakness, warmth, swelling, discoloration of arms or legs?No    Fever? No  Chest Pain Present: No  Shortness of breath: No    Other home therapies tried: HEAT/HEATING PAD and WARM BATH   Last home medication taken and when: oxycodone at 0600    Any Refills Needed?: No    Does patient have transportation & length of time to get to clinic: If earlier apt then she can, but if not then she will need transportation. About 20 min from clinic    Recommendations:   If you do not hear from the infusion center by 2pm then you will not be able to get in for an infusion today. If symptoms worsen while waiting for call back, and/or you experience fever, chills, SOB, chest pain, cough, n/v, dizziness, numbness, swelling, discoloration of extremities, then seek emergency evaluation in Emergency Department.     Pt voiced understanding.    Added to infusion wait list.    0818: BMT can offer apt at either 0900 or 1030.   0819: Call made to Jennifer. She can be here at 0900. Will take an Uber. May be a few minutes late.  Updated infusion nurse.  Message sent to CCOD to schedule pt.    Routed to Care Team.

## 2025-03-15 ENCOUNTER — HOSPITAL ENCOUNTER (EMERGENCY)
Facility: CLINIC | Age: 25
Discharge: HOME | End: 2025-03-15
Attending: EMERGENCY MEDICINE | Admitting: EMERGENCY MEDICINE
Payer: COMMERCIAL

## 2025-03-15 VITALS
HEART RATE: 92 BPM | DIASTOLIC BLOOD PRESSURE: 79 MMHG | TEMPERATURE: 98.1 F | WEIGHT: 162 LBS | BODY MASS INDEX: 27.66 KG/M2 | SYSTOLIC BLOOD PRESSURE: 124 MMHG | RESPIRATION RATE: 18 BRPM | HEIGHT: 64 IN | OXYGEN SATURATION: 92 %

## 2025-03-15 DIAGNOSIS — D57.00 SICKLE CELL PAIN CRISIS (H): ICD-10-CM

## 2025-03-15 LAB
ALBUMIN SERPL BCG-MCNC: 4.3 G/DL (ref 3.5–5.2)
ALP SERPL-CCNC: 56 U/L (ref 40–150)
ALT SERPL W P-5'-P-CCNC: 25 U/L (ref 0–50)
ANION GAP SERPL CALCULATED.3IONS-SCNC: 13 MMOL/L (ref 7–15)
AST SERPL W P-5'-P-CCNC: 44 U/L (ref 0–45)
BASOPHILS # BLD AUTO: 0.2 10E3/UL (ref 0–0.2)
BASOPHILS NFR BLD AUTO: 2 %
BILIRUB SERPL-MCNC: 2.5 MG/DL
BUN SERPL-MCNC: 11.5 MG/DL (ref 6–20)
CALCIUM SERPL-MCNC: 8.4 MG/DL (ref 8.8–10.4)
CHLORIDE SERPL-SCNC: 102 MMOL/L (ref 98–107)
CREAT SERPL-MCNC: 0.55 MG/DL (ref 0.51–0.95)
EGFRCR SERPLBLD CKD-EPI 2021: >90 ML/MIN/1.73M2
EOSINOPHIL # BLD AUTO: 0.5 10E3/UL (ref 0–0.7)
EOSINOPHIL NFR BLD AUTO: 4 %
ERYTHROCYTE [DISTWIDTH] IN BLOOD BY AUTOMATED COUNT: 21.2 % (ref 10–15)
GLUCOSE SERPL-MCNC: 81 MG/DL (ref 70–99)
HCO3 SERPL-SCNC: 21 MMOL/L (ref 22–29)
HCT VFR BLD AUTO: 18.2 % (ref 35–47)
HGB BLD-MCNC: 6.8 G/DL (ref 11.7–15.7)
IMM GRANULOCYTES # BLD: 0.1 10E3/UL
IMM GRANULOCYTES NFR BLD: 0 %
LYMPHOCYTES # BLD AUTO: 2.3 10E3/UL (ref 0.8–5.3)
LYMPHOCYTES NFR BLD AUTO: 17 %
MCH RBC QN AUTO: 31.6 PG (ref 26.5–33)
MCHC RBC AUTO-ENTMCNC: 37.4 G/DL (ref 31.5–36.5)
MCV RBC AUTO: 85 FL (ref 78–100)
MONOCYTES # BLD AUTO: 1.1 10E3/UL (ref 0–1.3)
MONOCYTES NFR BLD AUTO: 8 %
NEUTROPHILS # BLD AUTO: 9.2 10E3/UL (ref 1.6–8.3)
NEUTROPHILS NFR BLD AUTO: 69 %
NRBC # BLD AUTO: 0.1 10E3/UL
NRBC BLD AUTO-RTO: 1 /100
PLATELET # BLD AUTO: 500 10E3/UL (ref 150–450)
POTASSIUM SERPL-SCNC: 3.5 MMOL/L (ref 3.4–5.3)
PROT SERPL-MCNC: 7.2 G/DL (ref 6.4–8.3)
RBC # BLD AUTO: 2.15 10E6/UL (ref 3.8–5.2)
RETICS # AUTO: 0.42 10E6/UL (ref 0.03–0.1)
RETICS/RBC NFR AUTO: 19.8 % (ref 0.5–2)
SODIUM SERPL-SCNC: 136 MMOL/L (ref 135–145)
WBC # BLD AUTO: 13.4 10E3/UL (ref 4–11)

## 2025-03-15 PROCEDURE — 96361 HYDRATE IV INFUSION ADD-ON: CPT | Performed by: EMERGENCY MEDICINE

## 2025-03-15 PROCEDURE — 82947 ASSAY GLUCOSE BLOOD QUANT: CPT | Performed by: EMERGENCY MEDICINE

## 2025-03-15 PROCEDURE — 99284 EMERGENCY DEPT VISIT MOD MDM: CPT | Mod: 25 | Performed by: EMERGENCY MEDICINE

## 2025-03-15 PROCEDURE — 99285 EMERGENCY DEPT VISIT HI MDM: CPT | Performed by: EMERGENCY MEDICINE

## 2025-03-15 PROCEDURE — 82040 ASSAY OF SERUM ALBUMIN: CPT | Performed by: EMERGENCY MEDICINE

## 2025-03-15 PROCEDURE — 250N000013 HC RX MED GY IP 250 OP 250 PS 637: Performed by: EMERGENCY MEDICINE

## 2025-03-15 PROCEDURE — 85004 AUTOMATED DIFF WBC COUNT: CPT | Performed by: EMERGENCY MEDICINE

## 2025-03-15 PROCEDURE — 250N000011 HC RX IP 250 OP 636: Mod: JZ | Performed by: EMERGENCY MEDICINE

## 2025-03-15 PROCEDURE — 36415 COLL VENOUS BLD VENIPUNCTURE: CPT | Performed by: EMERGENCY MEDICINE

## 2025-03-15 PROCEDURE — 96376 TX/PRO/DX INJ SAME DRUG ADON: CPT | Performed by: EMERGENCY MEDICINE

## 2025-03-15 PROCEDURE — 96374 THER/PROPH/DIAG INJ IV PUSH: CPT | Performed by: EMERGENCY MEDICINE

## 2025-03-15 PROCEDURE — 96375 TX/PRO/DX INJ NEW DRUG ADDON: CPT | Performed by: EMERGENCY MEDICINE

## 2025-03-15 PROCEDURE — 85045 AUTOMATED RETICULOCYTE COUNT: CPT | Performed by: EMERGENCY MEDICINE

## 2025-03-15 PROCEDURE — 258N000003 HC RX IP 258 OP 636: Performed by: EMERGENCY MEDICINE

## 2025-03-15 RX ORDER — ONDANSETRON 2 MG/ML
4 INJECTION INTRAMUSCULAR; INTRAVENOUS ONCE
Status: COMPLETED | OUTPATIENT
Start: 2025-03-15 | End: 2025-03-15

## 2025-03-15 RX ORDER — DIPHENHYDRAMINE HCL 50 MG
50 CAPSULE ORAL ONCE
Status: COMPLETED | OUTPATIENT
Start: 2025-03-15 | End: 2025-03-15

## 2025-03-15 RX ORDER — KETOROLAC TROMETHAMINE 15 MG/ML
15 INJECTION, SOLUTION INTRAMUSCULAR; INTRAVENOUS ONCE
Status: COMPLETED | OUTPATIENT
Start: 2025-03-15 | End: 2025-03-15

## 2025-03-15 RX ORDER — SODIUM CHLORIDE, SODIUM LACTATE, POTASSIUM CHLORIDE, CALCIUM CHLORIDE 600; 310; 30; 20 MG/100ML; MG/100ML; MG/100ML; MG/100ML
INJECTION, SOLUTION INTRAVENOUS CONTINUOUS
Status: DISCONTINUED | OUTPATIENT
Start: 2025-03-15 | End: 2025-03-15 | Stop reason: HOSPADM

## 2025-03-15 RX ADMIN — ONDANSETRON 4 MG: 2 INJECTION INTRAMUSCULAR; INTRAVENOUS at 06:45

## 2025-03-15 RX ADMIN — HYDROMORPHONE HYDROCHLORIDE 1 MG: 1 INJECTION, SOLUTION INTRAMUSCULAR; INTRAVENOUS; SUBCUTANEOUS at 06:46

## 2025-03-15 RX ADMIN — HYDROMORPHONE HYDROCHLORIDE 1 MG: 1 INJECTION, SOLUTION INTRAMUSCULAR; INTRAVENOUS; SUBCUTANEOUS at 08:00

## 2025-03-15 RX ADMIN — DIPHENHYDRAMINE HYDROCHLORIDE 50 MG: 50 CAPSULE ORAL at 11:56

## 2025-03-15 RX ADMIN — SODIUM CHLORIDE, SODIUM LACTATE, POTASSIUM CHLORIDE, AND CALCIUM CHLORIDE: .6; .31; .03; .02 INJECTION, SOLUTION INTRAVENOUS at 06:46

## 2025-03-15 RX ADMIN — KETOROLAC TROMETHAMINE 15 MG: 15 INJECTION, SOLUTION INTRAMUSCULAR; INTRAVENOUS at 06:45

## 2025-03-15 RX ADMIN — HYDROMORPHONE HYDROCHLORIDE 1 MG: 1 INJECTION, SOLUTION INTRAMUSCULAR; INTRAVENOUS; SUBCUTANEOUS at 09:51

## 2025-03-15 ASSESSMENT — ACTIVITIES OF DAILY LIVING (ADL)
ADLS_ACUITY_SCORE: 58

## 2025-03-15 NOTE — ED NOTES
Sign out from Dr. Michael. Pain from sickle cell pain, patient is anemic but per hematology did not want to transfuse.  Patient has no chest pain or shortness of breath per Dr. Michael and on my reassessment.  She got her Dilaudid x 3.  Now requesting discharge.    MD Suhas Dye, Kilo Mason MD  03/15/25 [id number]

## 2025-03-15 NOTE — ED PROVIDER NOTES
ED Provider Note  [facility]      History     Chief Complaint   Patient presents with    Sickle Cell Pain Crisis     Feeling sick all day, had a fever at midnight with temperature of 101.2 F, pt also claims low back pain. Took Ibuprofen around 12 midnight     HPI  Jennifer Cervantes is a 25 year old female with past medical history of sickle cell disease with pain crisis, migraines, depression, stroke, spasticity, pulmonary embolism presents emergency department for chief complaint of sickle cell pain crisis.  She states that yesterday her low back started to ache, which is her typical symptom when she has sickle cell pain crisis.  She thought she could handle it with just her home medications however she woke up in the [place] with pain and could not control with her medications anymore.  It feels like an aching on the inside of her body.    She states that she felt warm and had a fever of 102 degrees at home at midnight today which subsided with some ibuprofen shortly afterward.  She has not had any fever since.    Denies any chest pain, shortness of breath, cough, rhinorrhea or congestion, sore throat, stroke symptoms, urinary or bowel incontinence, radiating pain, paresthesias.    Past Medical History  Past Medical History:   Diagnosis Date    Anxiety     Bleeding disorder     Blood clotting disorder     Cerebral infarction (H) 2015    Cognitive developmental delay     low IQ. 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 side (H)     right hand contractures    Iron overload due to repeated red blood cell transfusions     Migraines     Multiple subsegmental pulmonary emboli without acute cor pulmonale (H) 02/01/2021    Oppositional defiant behavior     Presence of intrauterine contraceptive device 2/18/2020    Superficial venous thrombosis of arm, right 03/25/2021     Uncomplicated asthma      Past Surgical History:   Procedure Laterality Date    AS INSERT TUNNELED CV 2 CATH W/O PORT/PUMP      CHOLECYSTECTOMY      CV RIGHT HEART CATH MEASUREMENTS RECORDED N/A 11/18/2021    Procedure: Right Heart Cath;  Surgeon: Jackson Stauffer MD;  Location:  HEART CARDIAC CATH LAB    INSERT PORT VASCULAR ACCESS Left 4/21/2021    Procedure: INSERTION, VASCULAR ACCESS PORT (NOT SURE ON SIDE UNTIL REMOVAL);  Surgeon: Rajan More MD;  Location: [facility] OR    IR CHEST PORT PLACEMENT > 5 YRS OF AGE  4/21/2021    IR CVC NON TUNNEL LINE REMOVAL  5/6/2021    IR CVC NON TUNNEL PLACEMENT > 5 YRS  04/07/2020    IR CVC NON TUNNEL PLACEMENT > 5 YRS  4/30/2021    IR CVC NON TUNNEL PLACEMENT > 5 YRS  9/7/2022    IR PORT REMOVAL LEFT  4/21/2021    REMOVE PORT VASCULAR ACCESS Left 4/21/2021    Procedure: REMOVAL, VASCULAR ACCESS PORT LEFT;  Surgeon: Rajan More MD;  Location: [facility] OR    REPAIR TENDON ELBOW Right 10/02/2019    Procedure: Right Elbow Flexor Lengthening, Flexor Pronator Slide Of Wrist and Finger, Thumb Adductor Lengthening;  Surgeon: Anai Franco MD;  Location: [facility] OR    TONSILLECTOMY Bilateral 10/02/2019    Procedure: Bilateral Tonsillectomy;  Surgeon: Farhana Guy MD;  Location: [facility] OR    ZZC BREAST REDUCTION (INCLUDES LIPO) TIER 3 Bilateral 04/23/2019     albuterol (PROVENTIL) (2.5 MG/3ML) 0.083% neb solution  aspirin (ASA) 81 MG chewable tablet  budesonide-formoterol (SYMBICORT/BREYNA) 160-4.5 MCG/ACT Inhaler  deferasirox (JADENU) 360 MG tablet  Deferiprone, Twice Daily, 1000 MG TABS  diclofenac (VOLTAREN) 1 % topical gel  diphenhydrAMINE (BENADRYL) 50 MG capsule  EPINEPHrine (ANY BX GENERIC EQUIV) 0.3 MG/0.3ML injection 2-pack  gabapentin (NEURONTIN) 300 MG capsule  hydroxyurea (HYDREA) 500 MG capsule  ibuprofen (ADVIL/MOTRIN) 800 MG tablet  methocarbamol (ROBAXIN) 750 MG tablet  methylPREDNISolone (MEDROL) 32 MG tablet  naloxone (NARCAN) 4 MG/0.1ML nasal  "spray  naproxen (NAPROSYN) 500 MG tablet  oxyCODONE (ROXICODONE) 5 MG tablet  [START ON 3/17/2025] oxyCODONE IR (ROXICODONE) 10 MG tablet      Allergies   Allergen Reactions    Contrast Dye Angioedema     Hives and breathing issues    Fish-Derived Products Hives    Seafood Hives    Adhesive Tape Hives     Primipore dressing causes hives    Gadolinium     Iodinated Contrast Media      Family History  Family History   Problem Relation Age of Onset    Sickle Cell Trait Mother     Hypertension Mother     Asthma Mother     Sickle Cell Trait Father     Glaucoma No family hx of     Macular Degeneration No family hx of     Diabetes No family hx of     Gout No family hx of      Social History   Social History     Tobacco Use    Smoking status: Never     Passive exposure: Never    Smokeless tobacco: Never   Substance Use Topics    Alcohol use: Not Currently     Alcohol/week: 0.0 standard drinks of alcohol    Drug use: Never      A medically appropriate review of systems was performed with pertinent positives and negatives noted in the HPI, and all other systems negative.    Physical Exam   BP: 124/79  Pulse: 92  Temp: 98.1  F (36.7  C)  Resp: 18  Height: 162.6 cm (5' 4\")  Weight: 73.5 kg (162 lb)  SpO2: 92 %  Physical Exam  General: no acute distress.    HENT: Normocephalic and atraumatic. Trachea midline. Normal voice.  Eyes: EOMI, conjunctivae normal.    Cardiovascular:  Normal rate and regular rhythm.   No murmur heard.  Radial pulses 2+ bilaterally.  Pulmonary:  No respiratory distress. Normal breath sounds bilaterally.  Abdominal: no distension.  Abdomen is soft. There is no mass. There is no abdominal tenderness.  Musculoskeletal:    Moving all extremities spontaneously.  No edema.  No reproducible midline or paraspinal back tenderness.  Normal range of motion.  Normal gait.  Skin: Warm, dry, and well perfused.   Neurological:  No focal deficit present.    Psychiatric:   The patient is awake, alert.  Appropriate mood " and affect.    ED Course, Procedures, & Data      Procedures                Results for orders placed or performed during the hospital encounter of 03/15/25   CBC with platelets differential     Status: None (In process)    Narrative    The following orders were created for panel order CBC with platelets differential.  Procedure                               Abnormality         Status                     ---------                               -----------         ------                     CBC with platelets and ...[[id number]]                      In process                   Please view results for these tests on the individual orders.     Medications   lactated ringers infusion ( Intravenous $New Bag 3/15/25 0646)   HYDROmorphone (DILAUDID) injection 1 mg (1 mg Intravenous $Given 3/15/25 0646)   ondansetron (ZOFRAN) injection 4 mg (4 mg Intravenous $Given 3/15/25 0645)   ketorolac (TORADOL) injection 15 mg (15 mg Intravenous $Given 3/15/25 0645)     Labs Ordered and Resulted from Time of ED Arrival to Time of ED Departure - No data to display  No orders to display          Critical care was not performed.     Medical Decision Making  The patient's presentation was of moderate complexity (a chronic illness mild to moderate exacerbation, progression, or side effect of treatment).    The patient's evaluation involved:  review of external note(s) from 3+ sources (see separate area of note for details)  review of 3+ test result(s) ordered prior to this encounter (see separate area of note for details)  strong consideration of a test (see separate area of note for details) that was ultimately deferred  ordering and/or review of 3+ test(s) in this encounter (see separate area of note for details)  independent interpretation of testing performed by another health professional (see separate area of note for details)    The patient's management necessitated further care after sign-out to Dr. Dai (see their note for  further management).    Assessment & Plan    Patient arrives to the emergency department for sickle cell pain crisis, located across her lower back, typical location for her sickle cell pain crises.  Patient did note an objective fever tonight at home, states that resolved with ibuprofen.    Sickle cell pain ED care plan initiated.  Oxygen saturation is 92%.  This appears to be around her baseline.  Afebrile and in no acute distress.  No reproducible tenderness on exam.    Hemoglobin is 6.8.  WBC is 13.4, which is around her baseline.  I spoke with hematology tonight, who does not feel the patient needs a blood transfusion in the emergency department.  They have an outpatient follow-up with her in 2 days.  Patient tolerated initial fluids, Zofran, Toradol and Dilaudid.  Currently requiring additional doses for persistent pain.    Patient care signed out oncoming physician to follow-up on ED care pain plan and final disposition.    I have reviewed the nursing notes. I have reviewed the findings, diagnosis, plan and need for follow up with the patient.    New Prescriptions    No medications on file       Final diagnoses:   None       [facility] EMERGENCY DEPARTMENT  3/15/2025     Jesica Michael DO  03/15/25 0838

## 2025-03-15 NOTE — ED TRIAGE NOTES
Triage Assessment (Adult)       Row Name 03/15/25 0617          Triage Assessment    Airway WDL WDL        Respiratory WDL    Respiratory WDL WDL        Skin Circulation/Temperature WDL    Skin Circulation/Temperature WDL WDL        Cardiac WDL    Cardiac WDL WDL        Peripheral/Neurovascular WDL    Peripheral Neurovascular WDL WDL        Cognitive/Neuro/Behavioral WDL    Cognitive/Neuro/Behavioral WDL WDL

## 2025-03-17 ENCOUNTER — NURSE TRIAGE (OUTPATIENT)
Dept: ONCOLOGY | Facility: CLINIC | Age: 25
End: 2025-03-17
Payer: COMMERCIAL

## 2025-03-17 ENCOUNTER — INFUSION THERAPY VISIT (OUTPATIENT)
Dept: INFUSION THERAPY | Facility: CLINIC | Age: 25
End: 2025-03-17
Attending: PEDIATRICS
Payer: COMMERCIAL

## 2025-03-17 ENCOUNTER — ONCOLOGY VISIT (OUTPATIENT)
Dept: ONCOLOGY | Facility: CLINIC | Age: 25
End: 2025-03-17
Attending: PEDIATRICS
Payer: COMMERCIAL

## 2025-03-17 ENCOUNTER — APPOINTMENT (OUTPATIENT)
Dept: LAB | Facility: CLINIC | Age: 25
End: 2025-03-17
Attending: PEDIATRICS
Payer: COMMERCIAL

## 2025-03-17 VITALS
HEART RATE: 86 BPM | DIASTOLIC BLOOD PRESSURE: 77 MMHG | RESPIRATION RATE: 16 BRPM | SYSTOLIC BLOOD PRESSURE: 123 MMHG | OXYGEN SATURATION: 90 % | TEMPERATURE: 97.4 F

## 2025-03-17 DIAGNOSIS — D57.1 HB-SS DISEASE WITHOUT CRISIS (H): ICD-10-CM

## 2025-03-17 DIAGNOSIS — I63.9 CEREBRAL INFARCTION, UNSPECIFIED MECHANISM (H): ICD-10-CM

## 2025-03-17 DIAGNOSIS — F32.A DEPRESSIVE DISORDER: ICD-10-CM

## 2025-03-17 DIAGNOSIS — D57.00 SICKLE CELL PAIN CRISIS (H): Primary | ICD-10-CM

## 2025-03-17 DIAGNOSIS — D57.00 SICKLE CELL DISEASE WITH CRISIS (H): Primary | ICD-10-CM

## 2025-03-17 DIAGNOSIS — E83.111 IRON OVERLOAD DUE TO REPEATED RED BLOOD CELL TRANSFUSIONS: ICD-10-CM

## 2025-03-17 DIAGNOSIS — G81.10 SPASTIC HEMIPLEGIA, UNSPECIFIED ETIOLOGY, UNSPECIFIED LATERALITY (H): ICD-10-CM

## 2025-03-17 DIAGNOSIS — G89.29 OTHER CHRONIC PAIN: ICD-10-CM

## 2025-03-17 LAB
ANION GAP SERPL CALCULATED.3IONS-SCNC: 10 MMOL/L (ref 7–15)
BASOPHILS # BLD AUTO: 0.3 10E3/UL (ref 0–0.2)
BASOPHILS NFR BLD AUTO: 2 %
BUN SERPL-MCNC: 4.1 MG/DL (ref 6–20)
CALCIUM SERPL-MCNC: 8.9 MG/DL (ref 8.8–10.4)
CHLORIDE SERPL-SCNC: 107 MMOL/L (ref 98–107)
CREAT SERPL-MCNC: 0.5 MG/DL (ref 0.51–0.95)
EGFRCR SERPLBLD CKD-EPI 2021: >90 ML/MIN/1.73M2
EOSINOPHIL # BLD AUTO: 0.4 10E3/UL (ref 0–0.7)
EOSINOPHIL NFR BLD AUTO: 4 %
ERYTHROCYTE [DISTWIDTH] IN BLOOD BY AUTOMATED COUNT: 24.8 % (ref 10–15)
GLUCOSE SERPL-MCNC: 101 MG/DL (ref 70–99)
HCO3 SERPL-SCNC: 23 MMOL/L (ref 22–29)
HCT VFR BLD AUTO: 18.9 % (ref 35–47)
HGB BLD-MCNC: 6.8 G/DL (ref 11.7–15.7)
IMM GRANULOCYTES # BLD: 0.1 10E3/UL
IMM GRANULOCYTES NFR BLD: 1 %
LYMPHOCYTES # BLD AUTO: 1.8 10E3/UL (ref 0.8–5.3)
LYMPHOCYTES NFR BLD AUTO: 15 %
MCH RBC QN AUTO: 30.8 PG (ref 26.5–33)
MCHC RBC AUTO-ENTMCNC: 36 G/DL (ref 31.5–36.5)
MCV RBC AUTO: 86 FL (ref 78–100)
MONOCYTES # BLD AUTO: 1 10E3/UL (ref 0–1.3)
MONOCYTES NFR BLD AUTO: 8 %
NEUTROPHILS # BLD AUTO: 8.3 10E3/UL (ref 1.6–8.3)
NEUTROPHILS NFR BLD AUTO: 70 %
NRBC # BLD AUTO: 0.5 10E3/UL
NRBC BLD AUTO-RTO: 4 /100
PLATELET # BLD AUTO: 522 10E3/UL (ref 150–450)
POTASSIUM SERPL-SCNC: 3.7 MMOL/L (ref 3.4–5.3)
RBC # BLD AUTO: 2.21 10E6/UL (ref 3.8–5.2)
RETICS # AUTO: 0.58 10E6/UL (ref 0.03–0.1)
RETICS/RBC NFR AUTO: 29.5 % (ref 0.5–2)
SODIUM SERPL-SCNC: 140 MMOL/L (ref 135–145)
WBC # BLD AUTO: 12 10E3/UL (ref 4–11)

## 2025-03-17 PROCEDURE — 250N000013 HC RX MED GY IP 250 OP 250 PS 637: Performed by: PEDIATRICS

## 2025-03-17 PROCEDURE — 36415 COLL VENOUS BLD VENIPUNCTURE: CPT | Performed by: PEDIATRICS

## 2025-03-17 PROCEDURE — 85004 AUTOMATED DIFF WBC COUNT: CPT | Performed by: PEDIATRICS

## 2025-03-17 PROCEDURE — 80048 BASIC METABOLIC PNL TOTAL CA: CPT | Performed by: PEDIATRICS

## 2025-03-17 PROCEDURE — 96375 TX/PRO/DX INJ NEW DRUG ADDON: CPT

## 2025-03-17 PROCEDURE — 258N000003 HC RX IP 258 OP 636: Performed by: PEDIATRICS

## 2025-03-17 PROCEDURE — 85041 AUTOMATED RBC COUNT: CPT | Performed by: PEDIATRICS

## 2025-03-17 PROCEDURE — 96361 HYDRATE IV INFUSION ADD-ON: CPT

## 2025-03-17 PROCEDURE — 96376 TX/PRO/DX INJ SAME DRUG ADON: CPT

## 2025-03-17 PROCEDURE — 85045 AUTOMATED RETICULOCYTE COUNT: CPT | Performed by: PEDIATRICS

## 2025-03-17 PROCEDURE — 96374 THER/PROPH/DIAG INJ IV PUSH: CPT

## 2025-03-17 PROCEDURE — 250N000011 HC RX IP 250 OP 636: Performed by: PEDIATRICS

## 2025-03-17 RX ORDER — HEPARIN SODIUM,PORCINE 10 UNIT/ML
5 VIAL (ML) INTRAVENOUS
OUTPATIENT
Start: 2025-04-01

## 2025-03-17 RX ORDER — DIPHENHYDRAMINE HCL 25 MG
50 CAPSULE ORAL
Status: COMPLETED | OUTPATIENT
Start: 2025-03-17 | End: 2025-03-17

## 2025-03-17 RX ORDER — HEPARIN SODIUM,PORCINE 10 UNIT/ML
5 VIAL (ML) INTRAVENOUS
OUTPATIENT
Start: 2025-03-27

## 2025-03-17 RX ORDER — DIPHENHYDRAMINE HYDROCHLORIDE 50 MG/ML
50 INJECTION, SOLUTION INTRAMUSCULAR; INTRAVENOUS
Start: 2025-03-27

## 2025-03-17 RX ORDER — METHYLPREDNISOLONE SODIUM SUCCINATE 40 MG/ML
40 INJECTION INTRAMUSCULAR; INTRAVENOUS
Start: 2025-03-27

## 2025-03-17 RX ORDER — KETOROLAC TROMETHAMINE 30 MG/ML
30 INJECTION, SOLUTION INTRAMUSCULAR; INTRAVENOUS ONCE
Status: CANCELLED
Start: 2025-04-01 | End: 2025-04-01

## 2025-03-17 RX ORDER — KETOROLAC TROMETHAMINE 30 MG/ML
30 INJECTION, SOLUTION INTRAMUSCULAR; INTRAVENOUS ONCE
Status: COMPLETED | OUTPATIENT
Start: 2025-03-17 | End: 2025-03-17

## 2025-03-17 RX ORDER — ALBUTEROL SULFATE 0.83 MG/ML
2.5 SOLUTION RESPIRATORY (INHALATION)
OUTPATIENT
Start: 2025-03-27

## 2025-03-17 RX ORDER — ALBUTEROL SULFATE 90 UG/1
1-2 INHALANT RESPIRATORY (INHALATION)
Start: 2025-03-27

## 2025-03-17 RX ORDER — MEPERIDINE HYDROCHLORIDE 25 MG/ML
25 INJECTION INTRAMUSCULAR; INTRAVENOUS; SUBCUTANEOUS
OUTPATIENT
Start: 2025-03-27

## 2025-03-17 RX ORDER — HEPARIN SODIUM (PORCINE) LOCK FLUSH IV SOLN 100 UNIT/ML 100 UNIT/ML
5 SOLUTION INTRAVENOUS
Status: CANCELLED | OUTPATIENT
Start: 2025-04-01

## 2025-03-17 RX ORDER — DIPHENHYDRAMINE HYDROCHLORIDE 50 MG/ML
25 INJECTION, SOLUTION INTRAMUSCULAR; INTRAVENOUS
Start: 2025-03-27

## 2025-03-17 RX ORDER — HEPARIN SODIUM (PORCINE) LOCK FLUSH IV SOLN 100 UNIT/ML 100 UNIT/ML
5 SOLUTION INTRAVENOUS
Status: DISCONTINUED | OUTPATIENT
Start: 2025-03-17 | End: 2025-03-17 | Stop reason: HOSPADM

## 2025-03-17 RX ORDER — ONDANSETRON 2 MG/ML
8 INJECTION INTRAMUSCULAR; INTRAVENOUS EVERY 6 HOURS PRN
Status: DISCONTINUED | OUTPATIENT
Start: 2025-03-17 | End: 2025-03-17 | Stop reason: HOSPADM

## 2025-03-17 RX ORDER — DIPHENHYDRAMINE HCL 25 MG
50 CAPSULE ORAL
Status: CANCELLED
Start: 2025-04-01

## 2025-03-17 RX ORDER — ONDANSETRON 4 MG/1
8 TABLET, FILM COATED ORAL
Status: DISCONTINUED | OUTPATIENT
Start: 2025-03-17 | End: 2025-03-17

## 2025-03-17 RX ORDER — HEPARIN SODIUM (PORCINE) LOCK FLUSH IV SOLN 100 UNIT/ML 100 UNIT/ML
5 SOLUTION INTRAVENOUS
OUTPATIENT
Start: 2025-03-27

## 2025-03-17 RX ORDER — ONDANSETRON 4 MG/1
8 TABLET, FILM COATED ORAL
Start: 2025-04-01

## 2025-03-17 RX ORDER — EPINEPHRINE 1 MG/ML
0.3 INJECTION, SOLUTION INTRAMUSCULAR; SUBCUTANEOUS EVERY 5 MIN PRN
OUTPATIENT
Start: 2025-03-27

## 2025-03-17 RX ORDER — ONDANSETRON 2 MG/ML
8 INJECTION INTRAMUSCULAR; INTRAVENOUS EVERY 6 HOURS PRN
Status: CANCELLED
Start: 2025-04-01

## 2025-03-17 RX ADMIN — HYDROMORPHONE HYDROCHLORIDE 1 MG: 1 INJECTION, SOLUTION INTRAMUSCULAR; INTRAVENOUS; SUBCUTANEOUS at 09:51

## 2025-03-17 RX ADMIN — Medication 5 ML: at 11:56

## 2025-03-17 RX ADMIN — DIPHENHYDRAMINE HYDROCHLORIDE 50 MG: 25 CAPSULE ORAL at 09:51

## 2025-03-17 RX ADMIN — HYDROMORPHONE HYDROCHLORIDE 1 MG: 1 INJECTION, SOLUTION INTRAMUSCULAR; INTRAVENOUS; SUBCUTANEOUS at 10:49

## 2025-03-17 RX ADMIN — ONDANSETRON 8 MG: 2 INJECTION INTRAMUSCULAR; INTRAVENOUS at 09:52

## 2025-03-17 RX ADMIN — HYDROMORPHONE HYDROCHLORIDE 1 MG: 1 INJECTION, SOLUTION INTRAMUSCULAR; INTRAVENOUS; SUBCUTANEOUS at 11:53

## 2025-03-17 RX ADMIN — SODIUM CHLORIDE, POTASSIUM CHLORIDE, SODIUM LACTATE AND CALCIUM CHLORIDE 1000 ML: 600; 310; 30; 20 INJECTION, SOLUTION INTRAVENOUS at 09:56

## 2025-03-17 RX ADMIN — KETOROLAC TROMETHAMINE 30 MG: 30 INJECTION, SOLUTION INTRAMUSCULAR; INTRAVENOUS at 09:55

## 2025-03-17 NOTE — PROGRESS NOTES
Adult Sickle Cell Outpatient Visit Note  Mar 17, 2025    Reason for Visit: routine follow-up for sickle cell disease, HgbSS. 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    Interval History:  Since the last visit with RONALDO Mantilla, she had her birthday and went to the [facility] where she was apparently quite successful for the first time. She has had three ED visits since her last one (8 total for the year), but she also said she is happy with how she has been able to stay out of the ED. She is currently on Hydrea and Jadenu and denies any missed doses. Her Ferriscan several weeks ago did show ~25% improvement in overall iron levels. She has been feeling happy overall as she is assisting in the care of her niece, now 8 months old. That said, she feels good with where things are with her health. She is still doing regular infusion visits but her oxycodone pill counts have been at one of the lowest points in her time in our system. She had a chronic pain flare today so she was in for infusion     She is happy with how things are going with her therapist. She values the ability to have an outlet and person to safely express her feelings.    She also wanted to pass on that she really appreciates the respect she gets from [employer] on a regular basis.      Sickle Cell Disease Comprehensive Checklist  Bone Health/Avascular Necrosis Screening/Symptoms (each visit): no new concerns today  Leg Ulcer evaluation (every visit): nothing to note  Hypertension (every visit):stable none for several months  Last pulmonary evaluation (asthma, AMAN, pulm HTN): 9/28/22, due for  follow-up  Stroke/silent cerebral infarct Hx (Y/N): Yes TIA ~2014, first event ~age 1 with full stroke and R sided weakness  Last PCP Visit: 9/30/24 with Dr. Case  Vaccines:  PCV13: 5/13/19  Pneumovax (PPSV23): 3/04, 10/09, 7/12/19, 10/2024  Menactra: 4/2010, 9/2015 (MCV done 8/16/21)  MenB: 9/16/15, 5/13/19, 1/30/25  Influenza: 10/11/24  Audiology (chelation): done 2/27/24    Comparison to Previous Audiogram dated 6/6/2022:     Pure Tone Thresholds 250-8000 Hz:    RIGHT: stable    LEFT: stable     High Frequency Audiometry 9,000-16,000Hz:     RIGHT: stable    LEFT: stable     Word Recognition Score:    RIGHT: stable    LEFT: stable    Plan last reviewed with patient: 3/17/25    Patient background: 24 yo F, enjoys movies and kids though there are times where she does not really want to talk to people. Does not have a lot of social support at home.     Sickle Cell Disease History  Primary Hematologist Team: Jose Rafael Duncan  PCP: none  Genotype: SS  Acute Pain Crisis Treatment: (limiting IV)   ER   Dilaudid 1 mg IV q1h up to 3 doses  Toradol 30 mg IV x1   Maintenance IV fluids with LR  Other: Zofran 8 mg IV PRN nausea  Inpatient:  PCA Dilaudid 1 hr q30 minutes, no basal rate  Toradol 30 mg x6h x 4 hr  LR maintenance x 1-2 days  Other Medications: Zofran  ASA  Supportive Care: Docusate Senna  Chronic Pain Medications:    Home regimen: oxycodone 15 mg p.o. q.4-6 hours p.r.n. breakthrough pain.  She also continues on Voltaren gel, and Zoloft among other medications.    -Also benefits from mental health visits, acupuncture  Baseline Hemoglobin: 7 g/dl without chronic transfusions  Hydroxyurea use: Yes  H/O blood transfusions: Yes, several (iron overload) Most recent 11/20/2021  H/O Transfusion Reactions: no  Antibodies:none  H/O Acute Chest Syndrome: Yes  Last episode:9/05/22 (previously 4/26/21, 10/2019)   ICU/intubation: not with 9/2022 admission  H/O Stroke: Yes (managed with chronic transfusions in the past,  stopped late Spring 2020)  H/O VTE: Yes (2/2021)  H/O Cholecystectomy or Splenectomy: no  H/O Asthma, Pulm HTN, AVN, Leg Ulcers, Nephropathy, Retinopathy, etc: Iron overload, asthma, chronic lung disease, physical limitations from early stroke    ---------------------------------------  Jennifer Cervantes's Goals (last discussed 1/30/25)    1-3 month goal:  Continue weekly therapy    6 month goal:      12 month goal:      Disease-specific goal(s):  Continue to decrease ED visits        Current Outpatient Medications   Medication Sig Dispense Refill    albuterol (PROVENTIL) (2.5 MG/3ML) 0.083% neb solution Take 1 vial (2.5 mg) by nebulization every 6 hours as needed for shortness of breath, wheezing or cough. 90 mL 0    aspirin (ASA) 81 MG chewable tablet Take 1 tablet (81 mg) by mouth 2 times daily 60 tablet 11    budesonide-formoterol (SYMBICORT/BREYNA) 160-4.5 MCG/ACT Inhaler Inhale 2 puffs twice daily plus 1-2 puffs as needed. May use up to 12 puffs per day. 20.4 g 11    deferasirox (JADENU) 360 MG tablet Take 4 tablets (1,440 mg) by mouth daily. 120 tablet 11    Deferiprone, Twice Daily, 1000 MG TABS Take 2,000 mg by mouth 2 times daily. 150 tablet 3    diclofenac (VOLTAREN) 1 % topical gel Apply 4 g topically 4 times daily as needed (moderate knee pain). 350 g 1    diphenhydrAMINE (BENADRYL) 50 MG capsule Administer 12  hours pre - IV contrast injection and 2 hours prior to contrast. Patient must have a . 2 capsule 0    EPINEPHrine (ANY BX GENERIC EQUIV) 0.3 MG/0.3ML injection 2-pack Inject 0.3 mLs (0.3 mg) into the muscle as needed for anaphylaxis. 1 each 1    gabapentin (NEURONTIN) 300 MG capsule Take 1 capsule (300 mg) by mouth 3 times daily. Take PO 1 pill in evening (300 mg) TID to start. If tolerated, increase by 300 mg in morning or lunch every few days, updating your doctor's office in time to get refills. The maximum dose is 900 mg TID. (Patient taking differently: Take 600 mg by mouth at bedtime.  Take PO 1 pill in evening (300 mg) TID to start. If tolerated, increase by 300 mg in morning or lunch every few days, updating your doctor's office in time to get refills. The maximum dose is 900 mg TID.) 90 capsule 1    hydroxyurea (HYDREA) 500 MG capsule Take 6 capsules (3,000 mg) by mouth daily 180 capsule 11    ibuprofen (ADVIL/MOTRIN) 800 MG tablet Take 800 mg by mouth every 8 hours as needed for moderate pain      methocarbamol (ROBAXIN) 750 MG tablet Take 1 tablet (750 mg) by mouth 4 times daily as needed for muscle spasms (during sickle pain crises. Okay to take scheduled while in pain). 60 tablet 1    methylPREDNISolone (MEDROL) 32 MG tablet Take 1 tab 12 hours before appointment and 1 tab 2 hours prior to the procedure with IV contrast. 2 tablet 0    naloxone (NARCAN) 4 MG/0.1ML nasal spray Spray 4 mg into one nostril alternating nostrils as needed for opioid reversal. every 2-3 minutes until assistance arrives 0.2 mL 0    naproxen (NAPROSYN) 500 MG tablet Take 1.5 tablets (750 mg) by mouth daily. 5 tablet 0    oxyCODONE (ROXICODONE) 5 MG tablet Take 1 tablet (5 mg) by mouth every 6 hours as needed. 12 tablet 0    oxyCODONE IR (ROXICODONE) 10 MG tablet Take 1 tablet (10 mg) by mouth every 6 hours as needed for moderate to severe pain. (Goal of less than 4 per day) 12 tablet 0     Past Medical History  Past Medical History:   Diagnosis Date    Anxiety     Bleeding disorder     Blood clotting disorder     Cerebral infarction (H) 2015    Cognitive developmental delay     low IQ. 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 side (H)     right hand contractures    Iron overload due to repeated red blood cell transfusions     Migraines     Multiple subsegmental pulmonary emboli without acute cor pulmonale (H) 02/01/2021    Oppositional defiant behavior     Presence of intrauterine  contraceptive device 2/18/2020    Superficial venous thrombosis of arm, right 03/25/2021    Uncomplicated asthma      Past Surgical History:   Procedure Laterality Date    AS INSERT TUNNELED CV 2 CATH W/O PORT/PUMP      CHOLECYSTECTOMY      CV RIGHT HEART CATH MEASUREMENTS RECORDED N/A 11/18/2021    Procedure: Right Heart Cath;  Surgeon: Jackson Stauffer MD;  Location:  HEART CARDIAC CATH LAB    INSERT PORT VASCULAR ACCESS Left 4/21/2021    Procedure: INSERTION, VASCULAR ACCESS PORT (NOT SURE ON SIDE UNTIL REMOVAL);  Surgeon: Rajan More MD;  Location: [facility] OR    IR CHEST PORT PLACEMENT > 5 YRS OF AGE  4/21/2021    IR CVC NON TUNNEL LINE REMOVAL  5/6/2021    IR CVC NON TUNNEL PLACEMENT > 5 YRS  04/07/2020    IR CVC NON TUNNEL PLACEMENT > 5 YRS  4/30/2021    IR CVC NON TUNNEL PLACEMENT > 5 YRS  9/7/2022    IR PORT REMOVAL LEFT  4/21/2021    REMOVE PORT VASCULAR ACCESS Left 4/21/2021    Procedure: REMOVAL, VASCULAR ACCESS PORT LEFT;  Surgeon: Rajan More MD;  Location: [facility] OR    REPAIR TENDON ELBOW Right 10/02/2019    Procedure: Right Elbow Flexor Lengthening, Flexor Pronator Slide Of Wrist and Finger, Thumb Adductor Lengthening;  Surgeon: Anai Franco MD;  Location: [facility] OR    TONSILLECTOMY Bilateral 10/02/2019    Procedure: Bilateral Tonsillectomy;  Surgeon: Farhana Guy MD;  Location: [facility] OR    ZZC BREAST REDUCTION (INCLUDES LIPO) TIER 3 Bilateral 04/23/2019     Allergies   Allergen Reactions    Contrast Dye Angioedema     Hives and breathing issues    Fish-Derived Products Hives    Seafood Hives    Adhesive Tape Hives     Primipore dressing causes hives    Gadolinium     Iodinated Contrast Media      Social History   Social History     Tobacco Use    Smoking status: Never     Passive exposure: Never    Smokeless tobacco: Never   Substance Use Topics    Alcohol use: Not Currently     Alcohol/week: 0.0 standard drinks of alcohol    Drug use: Never   Past medical history and social  history were reviewed.    Physical Examination:  Vitals reviewed from infusion    Wt Readings from Last 10 Encounters:   03/15/25 73.5 kg (162 lb)   03/13/25 73.5 kg (162 lb)   03/08/25 73.8 kg (162 lb 11.2 oz)   02/27/25 72.6 kg (160 lb 1.6 oz)   02/23/25 72.1 kg (159 lb)   02/14/25 72.4 kg (159 lb 11.2 oz)   02/04/25 73.5 kg (162 lb 1.6 oz)   01/31/25 74.4 kg (164 lb)   01/31/25 74.5 kg (164 lb 3.2 oz)   01/30/25 73.7 kg (162 lb 8 oz)     General: Pleasant female, NAD  Eyes: EOMI, PERRL  ENT: oral mucosa moist, pink  Respiratory: normal respiratory effort on room air  Ext: no peripheral edema  Neurologic: chronic contracture of right arm and wrist. Steady gait. A/O x 4.  Skin: No rashes, petechiae, or bruising noted on exposed skin.   Laboratory Data:  Recent Results (from the past 240 hours)   EKG 12 lead    Collection Time: 03/08/25  4:29 AM   Result Value Ref Range    Systolic Blood Pressure  mmHg    Diastolic Blood Pressure  mmHg    Ventricular Rate 84 BPM    Atrial Rate 84 BPM    TX Interval 154 ms    QRS Duration 74 ms     ms    QTc 472 ms    P Axis 70 degrees    R AXIS 50 degrees    T Axis 47 degrees    Interpretation ECG       Unconfirmed report - interpretation of this ECG is computer generated - see medical record for final interpretation  Sinus rhythm  Normal ECG    Confirmed by - EMERGENCY ROOM, PHYSICIAN ([id number]),  Lashanda Mckeon ([id number]) on 3/8/2025 2:32:45 PM     Basic metabolic panel    Collection Time: 03/08/25  4:55 AM   Result Value Ref Range    Sodium 138 135 - 145 mmol/L    Potassium 3.7 3.4 - 5.3 mmol/L    Chloride 104 98 - 107 mmol/L    Carbon Dioxide (CO2) 24 22 - 29 mmol/L    Anion Gap 10 7 - 15 mmol/L    Urea Nitrogen 6.9 6.0 - 20.0 mg/dL    Creatinine 0.63 0.51 - 0.95 mg/dL    GFR Estimate >90 >60 mL/min/1.73m2    Calcium 8.6 (L) 8.8 - 10.4 mg/dL    Glucose 91 70 - 99 mg/dL   Reticulocyte count    Collection Time: 03/08/25  4:55 AM   Result Value Ref Range    % Reticulocyte  21.8 (H) 0.5 - 2.0 %    Absolute Reticulocyte 0.476 (H) 0.025 - 0.095 10e6/uL   Troponin T, High Sensitivity    Collection Time: 03/08/25  4:55 AM   Result Value Ref Range    Troponin T, High Sensitivity <6 <=14 ng/L   CBC with platelets and differential    Collection Time: 03/08/25  4:55 AM   Result Value Ref Range    WBC Count 12.4 (H) 4.0 - 11.0 10e3/uL    RBC Count 2.20 (L) 3.80 - 5.20 10e6/uL    Hemoglobin 6.6 (LL) 11.7 - 15.7 g/dL    Hematocrit 19.3 (L) 35.0 - 47.0 %    MCV 88 78 - 100 fL    MCH 30.0 26.5 - 33.0 pg    MCHC 34.2 31.5 - 36.5 g/dL    RDW 20.9 (H) 10.0 - 15.0 %    Platelet Count 540 (H) 150 - 450 10e3/uL    % Neutrophils 68 %    % Lymphocytes 20 %    % Monocytes 8 %    % Eosinophils 3 %    % Basophils 2 %    % Immature Granulocytes 1 %    NRBCs per 100 WBC 1 (H) <1 /100    Absolute Neutrophils 8.4 (H) 1.6 - 8.3 10e3/uL    Absolute Lymphocytes 2.5 0.8 - 5.3 10e3/uL    Absolute Monocytes 0.9 0.0 - 1.3 10e3/uL    Absolute Eosinophils 0.3 0.0 - 0.7 10e3/uL    Absolute Basophils 0.2 0.0 - 0.2 10e3/uL    Absolute Immature Granulocytes 0.1 <=0.4 10e3/uL    Absolute NRBCs 0.2 10e3/uL   Reticulocyte count    Collection Time: 03/10/25  9:15 AM   Result Value Ref Range    % Reticulocyte 23.1 (H) 0.5 - 2.0 %    Absolute Reticulocyte 0.576 (H) 0.025 - 0.095 10e6/uL   Comprehensive metabolic panel    Collection Time: 03/10/25  9:15 AM   Result Value Ref Range    Sodium 138 135 - 145 mmol/L    Potassium 3.7 3.4 - 5.3 mmol/L    Carbon Dioxide (CO2) 21 (L) 22 - 29 mmol/L    Anion Gap 10 7 - 15 mmol/L    Urea Nitrogen 6.6 6.0 - 20.0 mg/dL    Creatinine 0.54 0.51 - 0.95 mg/dL    GFR Estimate >90 >60 mL/min/1.73m2    Calcium 8.9 8.8 - 10.4 mg/dL    Chloride 107 98 - 107 mmol/L    Glucose 93 70 - 99 mg/dL    Alkaline Phosphatase 57 40 - 150 U/L    AST 41 0 - 45 U/L    ALT 27 0 - 50 U/L    Protein Total 7.6 6.4 - 8.3 g/dL    Albumin 4.3 3.5 - 5.2 g/dL    Bilirubin Total 2.2 (H) <=1.2 mg/dL   CBC with platelets and  differential    Collection Time: 03/10/25  9:15 AM   Result Value Ref Range    WBC Count 13.0 (H) 4.0 - 11.0 10e3/uL    RBC Count 2.45 (L) 3.80 - 5.20 10e6/uL    Hemoglobin 7.5 (L) 11.7 - 15.7 g/dL    Hematocrit 22.1 (L) 35.0 - 47.0 %    MCV 90 78 - 100 fL    MCH 30.6 26.5 - 33.0 pg    MCHC 33.9 31.5 - 36.5 g/dL    RDW 22.0 (H) 10.0 - 15.0 %    Platelet Count 617 (H) 150 - 450 10e3/uL    % Neutrophils 73 %    % Lymphocytes 16 %    % Monocytes 6 %    % Eosinophils 3 %    % Basophils 2 %    % Immature Granulocytes 0 %    NRBCs per 100 WBC 2 (H) <1 /100    Absolute Neutrophils 9.5 (H) 1.6 - 8.3 10e3/uL    Absolute Lymphocytes 2.0 0.8 - 5.3 10e3/uL    Absolute Monocytes 0.8 0.0 - 1.3 10e3/uL    Absolute Eosinophils 0.4 0.0 - 0.7 10e3/uL    Absolute Basophils 0.3 (H) 0.0 - 0.2 10e3/uL    Absolute Immature Granulocytes 0.1 <=0.4 10e3/uL    Absolute NRBCs 0.3 10e3/uL   Reticulocyte count    Collection Time: 03/15/25  6:40 AM   Result Value Ref Range    % Reticulocyte 19.8 (H) 0.5 - 2.0 %    Absolute Reticulocyte 0.425 (H) 0.025 - 0.095 10e6/uL   CBC with platelets and differential    Collection Time: 03/15/25  6:40 AM   Result Value Ref Range    WBC Count 13.4 (H) 4.0 - 11.0 10e3/uL    RBC Count 2.15 (L) 3.80 - 5.20 10e6/uL    Hemoglobin 6.8 (LL) 11.7 - 15.7 g/dL    Hematocrit 18.2 (L) 35.0 - 47.0 %    MCV 85 78 - 100 fL    MCH 31.6 26.5 - 33.0 pg    MCHC 37.4 (H) 31.5 - 36.5 g/dL    RDW 21.2 (H) 10.0 - 15.0 %    Platelet Count 500 (H) 150 - 450 10e3/uL    % Neutrophils 69 %    % Lymphocytes 17 %    % Monocytes 8 %    % Eosinophils 4 %    % Basophils 2 %    % Immature Granulocytes 0 %    NRBCs per 100 WBC 1 (H) <1 /100    Absolute Neutrophils 9.2 (H) 1.6 - 8.3 10e3/uL    Absolute Lymphocytes 2.3 0.8 - 5.3 10e3/uL    Absolute Monocytes 1.1 0.0 - 1.3 10e3/uL    Absolute Eosinophils 0.5 0.0 - 0.7 10e3/uL    Absolute Basophils 0.2 0.0 - 0.2 10e3/uL    Absolute Immature Granulocytes 0.1 <=0.4 10e3/uL    Absolute NRBCs 0.1  10e3/uL   Comprehensive metabolic panel    Collection Time: 03/15/25  7:15 AM   Result Value Ref Range    Sodium 136 135 - 145 mmol/L    Potassium 3.5 3.4 - 5.3 mmol/L    Carbon Dioxide (CO2) 21 (L) 22 - 29 mmol/L    Anion Gap 13 7 - 15 mmol/L    Urea Nitrogen 11.5 6.0 - 20.0 mg/dL    Creatinine 0.55 0.51 - 0.95 mg/dL    GFR Estimate >90 >60 mL/min/1.73m2    Calcium 8.4 (L) 8.8 - 10.4 mg/dL    Chloride 102 98 - 107 mmol/L    Glucose 81 70 - 99 mg/dL    Alkaline Phosphatase 56 40 - 150 U/L    AST 44 0 - 45 U/L    ALT 25 0 - 50 U/L    Protein Total 7.2 6.4 - 8.3 g/dL    Albumin 4.3 3.5 - 5.2 g/dL    Bilirubin Total 2.5 (H) <=1.2 mg/dL     I reviewed the above labs today.    Assessment and Plan:  1. Sickle Cell HgbSS Disease  2. Acute flare of chronic pain  3. Iron overload  4. 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 History of CVA  6. Hearing loss  7. Mental Health       Jennifer was seen for pain medications and fluids today along with her scheduled follow up. She continues to work towards ED visit reduction. She is higher on her ED visit counts right now than previous years at this time but the tradeoff has been that she is getting less oxycodone on a regular basis which is good. We continue to encourage her to focus on taking her medications on an as-needed basis which would likely require her to go days without any oxycodone.  We will continue her current home oxycodone prescriptions of 12 tablets per week which has remained stable for the past 3 months.       HbSS with acute on chronic pain and iron overload  -Continue HU 3000 mg daily  -Monthly crizanlizumab    Pain management  -Mix of pain medications including oxycodone, Robaxin, ibuprofen or naproxen, Tylenol. No more than 2 infusions per week. No change to oxycodone today  -Continue diligent home management with current medications, heat, rest, compression, warm baths.   -Continue to reassess plan for home oxycodone use  monthly.  Continue prescriptions for 12 tablets oxycodone per week.  We can continue to decrease weekly if she desires.  We are aiming to avoid any dose or quantity escalations.   --If unable to manage at home can go to ED  with continued plan to use IV Dilaudid and take a break from ketamine (10/2/23). No PCA use and goal for any admission would still be to discharge by 5 days or less    Iron Overload  A Ferriscan was performed 1/30/25 showing a decrease in her average iron concentration, now 24 mg/g dry tissue, previously 31.8. Repeat a Ferriscan in 6 months (June 2025). She remains on Jadenu 1440 mg daily and deferiprone 2000 mg BID.    Stroke Hx  Holding off on chronic transfusions right now per mutually agreed upon management. Trying to reduce iron further before considering restart.  -Still on [facility]    Mental Health  We are very pleased with her dedication to therapy over the past two months. She has found this extremely beneficial in managing interpersonal relationships with family members. Health maintenance reviewed today and up-to-date.        We will continue to see her monthly for ANDREAS/MD visits with her sidney infusions.         Patricia Mantilla CNP  ---  55 minutes spent on the date of the encounter doing chart review, review of test results, interpretation of tests, patient visit, and documentation.    The longitudinal plan of care for the diagnosis(es)/condition(s) as documented were addressed during this visit. Due to the added complexity in care, I will continue to support Jennifer in the subsequent management and with ongoing continuity of care.

## 2025-03-17 NOTE — TELEPHONE ENCOUNTER
[facility] can offer 0930 apt   Spoke with Jennifer who confirmed she can be here at 0900 for labs and 0930 for apt in [place].  No transportation needed.  Message sent to [facility] to schedule.

## 2025-03-17 NOTE — TELEPHONE ENCOUNTER
Oncology Nurse Triage - Sickle Cell Pain Crisis:    Situation: Jennifer  calling about Sickle Cell Pain Crisis, requesting to be added on for IV fluids and pain medicine    Background:     Patient's last infusion was 3/13/25  Last clinic visit date:2/27/25 Farhan Mantilla   Does patient have active treatment plan? Yes    Assessment of Symptoms:  Onset/Duration of symptoms: 1 day    Is it typical sickle cell pain? Yes  Location: left leg stomach towards back   Character: Dull ache  Intensity: 9/10    Any radiation of pain, numbness, tingling, weakness, warmth, swelling, discoloration of arms or legs?No    Fever? No  (if yes max temperature recorded in last 24 hours):      Chest Pain Present: No    Shortness of breath: No    Other home therapies tried: HEAT/HEATING PAD and WARM BATH     Last home medication taken and when: ibuprofen yesterday evening     Any Refills Needed?: No    Does patient have transportation & length of time to get to clinic: Yes she can get to the [facility] [place] and has appt with DR Duncan   Lab appt at 1:30 and 2:00pm appt with DR Duncan     Recommendations:   Approved by farhan mantilla   Placed on infusion call list     If you do not hear from the infusion center by 2pm then you will not be able to get in for an infusion today. If symptoms worsen while waiting for call back, and/or you experience fever, chills, SOB, chest pain, cough, n/v, dizziness, numbness, swelling, discoloration of extremities, then seek emergency evaluation in Emergency Department.     Please note, if you are late for your appt, you risk losing your infusion appt as it may delay another patient's infusion who arrived on time.

## 2025-03-17 NOTE — LETTER
3/17/2025         RE: Jennifer Cervantes  [address]  [facility] [address]      Adult Sickle Cell Outpatient Visit Note  Mar 17, 2025    Reason for Visit: routine follow-up for sickle cell disease, HgbSS. 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    Interval History:  Since the last visit with RONALDO Mantilla, she had her birthday and went to the [facility] where she was apparently quite successful for the first time. She has had three ED visits since her last one (8 total for the year), but she also said she is happy with how she has been able to stay out of the ED. She is currently on Hydrea and Jadenu and denies any missed doses. Her Ferriscan several weeks ago did show ~25% improvement in overall iron levels. She has been feeling happy overall as she is assisting in the care of her niece, now 8 months old, while her sister is managing a CPS situation. She said she has had some ups and downs with boyfriends, with her previous boyfriend being in and out of long term and a second man who has different goals in a relationship than Jennifer wants. That said, she feels good with where things are with her health. She is still doing regular infusion visits but her oxycodone pill counts have been at one of the lowest points in her time in our system. She had a chronic pain flare today so she was in for infusion     She is happy with how things are going with her therapist. She values the ability to have an outlet and person to safely express her feelings.    She also wanted to pass on that she really appreciates the respect she gets from Indigio on a regular basis.      Sickle  Cell Disease Comprehensive Checklist  Bone Health/Avascular Necrosis Screening/Symptoms (each visit): no new concerns today  Leg Ulcer evaluation (every visit): nothing to note  Hypertension (every visit):stable none for several months  Last pulmonary evaluation (asthma, AMAN, pulm HTN): 9/28/22, due for follow-up  Stroke/silent cerebral infarct Hx (Y/N): Yes TIA ~2014, first event ~age 1 with full stroke and R sided weakness  Last PCP Visit: 9/30/24 with Dr. Case  Vaccines:  PCV13: 5/13/19  Pneumovax (PPSV23): 3/04, 10/09, 7/12/19, 10/2024  Menactra: 4/2010, 9/2015 (MCV done 8/16/21)  MenB: 9/16/15, 5/13/19, 1/30/25  Influenza: 10/11/24  Audiology (chelation): done 2/27/24    Comparison to Previous Audiogram dated 6/6/2022:     Pure Tone Thresholds 250-8000 Hz:    RIGHT: stable    LEFT: stable     High Frequency Audiometry 9,000-16,000Hz:     RIGHT: stable    LEFT: stable     Word Recognition Score:    RIGHT: stable    LEFT: stable    Plan last reviewed with patient: 3/17/25    Patient background: 26 yo F, enjoys movies and kids though there are times where she does not really want to talk to people. Does not have a lot of social support at home.     Sickle Cell Disease History  Primary Hematologist Team: Jose Rafael Duncan  PCP: none  Genotype: SS  Acute Pain Crisis Treatment: (limiting IV)   ER   Dilaudid 1 mg IV q1h up to 3 doses  Toradol 30 mg IV x1   Maintenance IV fluids with LR  Other: Zofran 8 mg IV PRN nausea  Inpatient:  PCA Dilaudid 1 hr q30 minutes, no basal rate  Toradol 30 mg x6h x 4 hr  LR maintenance x 1-2 days  Other Medications: Zofran  ASA  Supportive Care: Docusate, Senna  Chronic Pain Medications:    Home regimen: oxycodone 15 mg p.o. q.4-6 hours p.r.n. breakthrough pain.  She also continues on Voltaren gel, and Zoloft among other medications.    -Also benefits from mental health visits, acupuncture  Baseline Hemoglobin: 7 g/dl without chronic transfusions  Hydroxyurea use: Yes  H/O blood  transfusions: Yes, several (iron overload) Most recent 11/20/2021  H/O Transfusion Reactions: no  Antibodies:none  H/O Acute Chest Syndrome: Yes  Last episode:9/05/22 (previously 4/26/21, 10/2019)   ICU/intubation: not with 9/2022 admission  H/O Stroke: Yes (managed with chronic transfusions in the past, stopped late Spring 2020)  H/O VTE: Yes (2/2021)  H/O Cholecystectomy or Splenectomy: no  H/O Asthma, Pulm HTN, AVN, Leg Ulcers, Nephropathy, Retinopathy, etc: Iron overload, asthma, chronic lung disease, physical limitations from early stroke    ---------------------------------------  Jennifer Cervantes's Goals (last discussed 1/30/25)    1-3 month goal:  Continue weekly therapy    6 month goal:      12 month goal:      Disease-specific goal(s):  Continue to decrease ED visits        Current Outpatient Medications   Medication Sig Dispense Refill     albuterol (PROVENTIL) (2.5 MG/3ML) 0.083% neb solution Take 1 vial (2.5 mg) by nebulization every 6 hours as needed for shortness of breath, wheezing or cough. 90 mL 0     aspirin (ASA) 81 MG chewable tablet Take 1 tablet (81 mg) by mouth 2 times daily 60 tablet 11     budesonide-formoterol (SYMBICORT/BREYNA) 160-4.5 MCG/ACT Inhaler Inhale 2 puffs twice daily plus 1-2 puffs as needed. May use up to 12 puffs per day. 20.4 g 11     deferasirox (JADENU) 360 MG tablet Take 4 tablets (1,440 mg) by mouth daily. 120 tablet 11     Deferiprone, Twice Daily, 1000 MG TABS Take 2,000 mg by mouth 2 times daily. 150 tablet 3     diclofenac (VOLTAREN) 1 % topical gel Apply 4 g topically 4 times daily as needed (moderate knee pain). 350 g 1     diphenhydrAMINE (BENADRYL) 50 MG capsule Administer 12  hours pre - IV contrast injection and 2 hours prior to contrast. Patient must have a . 2 capsule 0     EPINEPHrine (ANY BX GENERIC EQUIV) 0.3 MG/0.3ML injection 2-pack Inject 0.3 mLs (0.3 mg) into the muscle as needed for anaphylaxis. 1 each 1     gabapentin (NEURONTIN) 300 MG capsule  Take 1 capsule (300 mg) by mouth 3 times daily. Take PO 1 pill in evening (300 mg) TID to start. If tolerated, increase by 300 mg in morning or lunch every few days, updating your doctor's office in time to get refills. The maximum dose is 900 mg TID. (Patient taking differently: Take 600 mg by mouth at bedtime. Take PO 1 pill in evening (300 mg) TID to start. If tolerated, increase by 300 mg in morning or lunch every few days, updating your doctor's office in time to get refills. The maximum dose is 900 mg TID.) 90 capsule 1     hydroxyurea (HYDREA) 500 MG capsule Take 6 capsules (3,000 mg) by mouth daily 180 capsule 11     ibuprofen (ADVIL/MOTRIN) 800 MG tablet Take 800 mg by mouth every 8 hours as needed for moderate pain       methocarbamol (ROBAXIN) 750 MG tablet Take 1 tablet (750 mg) by mouth 4 times daily as needed for muscle spasms (during sickle pain crises. Okay to take scheduled while in pain). 60 tablet 1     methylPREDNISolone (MEDROL) 32 MG tablet Take 1 tab 12 hours before appointment and 1 tab 2 hours prior to the procedure with IV contrast. 2 tablet 0     naloxone (NARCAN) 4 MG/0.1ML nasal spray Spray 4 mg into one nostril alternating nostrils as needed for opioid reversal. every 2-3 minutes until assistance arrives 0.2 mL 0     naproxen (NAPROSYN) 500 MG tablet Take 1.5 tablets (750 mg) by mouth daily. 5 tablet 0     oxyCODONE (ROXICODONE) 5 MG tablet Take 1 tablet (5 mg) by mouth every 6 hours as needed. 12 tablet 0     oxyCODONE IR (ROXICODONE) 10 MG tablet Take 1 tablet (10 mg) by mouth every 6 hours as needed for moderate to severe pain. (Goal of less than 4 per day) 12 tablet 0     Past Medical History  Past Medical History:   Diagnosis Date     Anxiety      Bleeding disorder      Blood clotting disorder      Cerebral infarction (H) 2015     Cognitive developmental delay     low IQ. Please recognize when managing pain and planning with her     Depressive disorder      Hemiplegia and  hemiparesis following cerebral infarction affecting right dominant side (H)     right hand contractures     Iron overload due to repeated red blood cell transfusions      Migraines      Multiple subsegmental pulmonary emboli without acute cor pulmonale (H) 02/01/2021     Oppositional defiant behavior      Presence of intrauterine contraceptive device 2/18/2020     Superficial venous thrombosis of arm, right 03/25/2021     Uncomplicated asthma      Past Surgical History:   Procedure Laterality Date     AS INSERT TUNNELED CV 2 CATH W/O PORT/PUMP       CHOLECYSTECTOMY       CV RIGHT HEART CATH MEASUREMENTS RECORDED N/A 11/18/2021    Procedure: Right Heart Cath;  Surgeon: Jackson Stauffer MD;  Location:  [facility] CATH LAB     INSERT PORT VASCULAR ACCESS Left 4/21/2021    Procedure: INSERTION, VASCULAR ACCESS PORT (NOT SURE ON SIDE UNTIL REMOVAL);  Surgeon: Rajan More MD;  Location: [facility] OR     IR CHEST PORT PLACEMENT > 5 YRS OF AGE  4/21/2021     IR CVC NON TUNNEL LINE REMOVAL  5/6/2021     IR CVC NON TUNNEL PLACEMENT > 5 YRS  04/07/2020     IR CVC NON TUNNEL PLACEMENT > 5 YRS  4/30/2021     IR CVC NON TUNNEL PLACEMENT > 5 YRS  9/7/2022     IR PORT REMOVAL LEFT  4/21/2021     REMOVE PORT VASCULAR ACCESS Left 4/21/2021    Procedure: REMOVAL, VASCULAR ACCESS PORT LEFT;  Surgeon: Rajan More MD;  Location: [facility] OR     REPAIR TENDON ELBOW Right 10/02/2019    Procedure: Right Elbow Flexor Lengthening, Flexor Pronator Slide Of Wrist and Finger, Thumb Adductor Lengthening;  Surgeon: Anai Franco MD;  Location: [facility] OR     TONSILLECTOMY Bilateral 10/02/2019    Procedure: Bilateral Tonsillectomy;  Surgeon: Farhana Guy MD;  Location: [facility] OR     ZZC BREAST REDUCTION (INCLUDES LIPO) TIER 3 Bilateral 04/23/2019     Allergies   Allergen Reactions     Contrast Dye Angioedema     Hives and breathing issues     Fish-Derived Products Hives     Seafood Hives     Adhesive Tape Hives     Primipore  dressing causes hives     Gadolinium      Iodinated Contrast Media      Social History   Social History     Tobacco Use     Smoking status: Never     Passive exposure: Never     Smokeless tobacco: Never   Substance Use Topics     Alcohol use: Not Currently     Alcohol/week: 0.0 standard drinks of alcohol     Drug use: Never   Past medical history and social history were reviewed.    Physical Examination:  Vitals reviewed from infusion    Wt Readings from Last 10 Encounters:   03/15/25 73.5 kg (162 lb)   03/13/25 73.5 kg (162 lb)   03/08/25 73.8 kg (162 lb 11.2 oz)   02/27/25 72.6 kg (160 lb 1.6 oz)   02/23/25 72.1 kg (159 lb)   02/14/25 72.4 kg (159 lb 11.2 oz)   02/04/25 73.5 kg (162 lb 1.6 oz)   01/31/25 74.4 kg (164 lb)   01/31/25 74.5 kg (164 lb 3.2 oz)   01/30/25 73.7 kg (162 lb 8 oz)     General: Pleasant female, NAD  Eyes: EOMI, PERRL  ENT: oral mucosa moist, pink  Respiratory: normal respiratory effort on room air  Ext: no peripheral edema  Neurologic: chronic contracture of right arm and wrist. Steady gait. A/O x 4.  Skin: No rashes, petechiae, or bruising noted on exposed skin.   Laboratory Data:  Recent Results (from the past 240 hours)   EKG 12 lead    Collection Time: 03/08/25  4:29 AM   Result Value Ref Range    Systolic Blood Pressure  mmHg    Diastolic Blood Pressure  mmHg    Ventricular Rate 84 BPM    Atrial Rate 84 BPM    ME Interval 154 ms    QRS Duration 74 ms     ms    QTc 472 ms    P Axis 70 degrees    R AXIS 50 degrees    T Axis 47 degrees    Interpretation ECG       Unconfirmed report - interpretation of this ECG is computer generated - see medical record for final interpretation  Sinus rhythm  Normal ECG    Confirmed by - EMERGENCY ROOM, PHYSICIAN ([id number]),  Lashanda Mckeon ([id number]) on 3/8/2025 2:32:45 PM     Basic metabolic panel    Collection Time: 03/08/25  4:55 AM   Result Value Ref Range    Sodium 138 135 - 145 mmol/L    Potassium 3.7 3.4 - 5.3 mmol/L    Chloride 104 98 - 107  mmol/L    Carbon Dioxide (CO2) 24 22 - 29 mmol/L    Anion Gap 10 7 - 15 mmol/L    Urea Nitrogen 6.9 6.0 - 20.0 mg/dL    Creatinine 0.63 0.51 - 0.95 mg/dL    GFR Estimate >90 >60 mL/min/1.73m2    Calcium 8.6 (L) 8.8 - 10.4 mg/dL    Glucose 91 70 - 99 mg/dL   Reticulocyte count    Collection Time: 03/08/25  4:55 AM   Result Value Ref Range    % Reticulocyte 21.8 (H) 0.5 - 2.0 %    Absolute Reticulocyte 0.476 (H) 0.025 - 0.095 10e6/uL   Troponin T, High Sensitivity    Collection Time: 03/08/25  4:55 AM   Result Value Ref Range    Troponin T, High Sensitivity <6 <=14 ng/L   CBC with platelets and differential    Collection Time: 03/08/25  4:55 AM   Result Value Ref Range    WBC Count 12.4 (H) 4.0 - 11.0 10e3/uL    RBC Count 2.20 (L) 3.80 - 5.20 10e6/uL    Hemoglobin 6.6 (LL) 11.7 - 15.7 g/dL    Hematocrit 19.3 (L) 35.0 - 47.0 %    MCV 88 78 - 100 fL    MCH 30.0 26.5 - 33.0 pg    MCHC 34.2 31.5 - 36.5 g/dL    RDW 20.9 (H) 10.0 - 15.0 %    Platelet Count 540 (H) 150 - 450 10e3/uL    % Neutrophils 68 %    % Lymphocytes 20 %    % Monocytes 8 %    % Eosinophils 3 %    % Basophils 2 %    % Immature Granulocytes 1 %    NRBCs per 100 WBC 1 (H) <1 /100    Absolute Neutrophils 8.4 (H) 1.6 - 8.3 10e3/uL    Absolute Lymphocytes 2.5 0.8 - 5.3 10e3/uL    Absolute Monocytes 0.9 0.0 - 1.3 10e3/uL    Absolute Eosinophils 0.3 0.0 - 0.7 10e3/uL    Absolute Basophils 0.2 0.0 - 0.2 10e3/uL    Absolute Immature Granulocytes 0.1 <=0.4 10e3/uL    Absolute NRBCs 0.2 10e3/uL   Reticulocyte count    Collection Time: 03/10/25  9:15 AM   Result Value Ref Range    % Reticulocyte 23.1 (H) 0.5 - 2.0 %    Absolute Reticulocyte 0.576 (H) 0.025 - 0.095 10e6/uL   Comprehensive metabolic panel    Collection Time: 03/10/25  9:15 AM   Result Value Ref Range    Sodium 138 135 - 145 mmol/L    Potassium 3.7 3.4 - 5.3 mmol/L    Carbon Dioxide (CO2) 21 (L) 22 - 29 mmol/L    Anion Gap 10 7 - 15 mmol/L    Urea Nitrogen 6.6 6.0 - 20.0 mg/dL    Creatinine 0.54 0.51 -  0.95 mg/dL    GFR Estimate >90 >60 mL/min/1.73m2    Calcium 8.9 8.8 - 10.4 mg/dL    Chloride 107 98 - 107 mmol/L    Glucose 93 70 - 99 mg/dL    Alkaline Phosphatase 57 40 - 150 U/L    AST 41 0 - 45 U/L    ALT 27 0 - 50 U/L    Protein Total 7.6 6.4 - 8.3 g/dL    Albumin 4.3 3.5 - 5.2 g/dL    Bilirubin Total 2.2 (H) <=1.2 mg/dL   CBC with platelets and differential    Collection Time: 03/10/25  9:15 AM   Result Value Ref Range    WBC Count 13.0 (H) 4.0 - 11.0 10e3/uL    RBC Count 2.45 (L) 3.80 - 5.20 10e6/uL    Hemoglobin 7.5 (L) 11.7 - 15.7 g/dL    Hematocrit 22.1 (L) 35.0 - 47.0 %    MCV 90 78 - 100 fL    MCH 30.6 26.5 - 33.0 pg    MCHC 33.9 31.5 - 36.5 g/dL    RDW 22.0 (H) 10.0 - 15.0 %    Platelet Count 617 (H) 150 - 450 10e3/uL    % Neutrophils 73 %    % Lymphocytes 16 %    % Monocytes 6 %    % Eosinophils 3 %    % Basophils 2 %    % Immature Granulocytes 0 %    NRBCs per 100 WBC 2 (H) <1 /100    Absolute Neutrophils 9.5 (H) 1.6 - 8.3 10e3/uL    Absolute Lymphocytes 2.0 0.8 - 5.3 10e3/uL    Absolute Monocytes 0.8 0.0 - 1.3 10e3/uL    Absolute Eosinophils 0.4 0.0 - 0.7 10e3/uL    Absolute Basophils 0.3 (H) 0.0 - 0.2 10e3/uL    Absolute Immature Granulocytes 0.1 <=0.4 10e3/uL    Absolute NRBCs 0.3 10e3/uL   Reticulocyte count    Collection Time: 03/15/25  6:40 AM   Result Value Ref Range    % Reticulocyte 19.8 (H) 0.5 - 2.0 %    Absolute Reticulocyte 0.425 (H) 0.025 - 0.095 10e6/uL   CBC with platelets and differential    Collection Time: 03/15/25  6:40 AM   Result Value Ref Range    WBC Count 13.4 (H) 4.0 - 11.0 10e3/uL    RBC Count 2.15 (L) 3.80 - 5.20 10e6/uL    Hemoglobin 6.8 (LL) 11.7 - 15.7 g/dL    Hematocrit 18.2 (L) 35.0 - 47.0 %    MCV 85 78 - 100 fL    MCH 31.6 26.5 - 33.0 pg    MCHC 37.4 (H) 31.5 - 36.5 g/dL    RDW 21.2 (H) 10.0 - 15.0 %    Platelet Count 500 (H) 150 - 450 10e3/uL    % Neutrophils 69 %    % Lymphocytes 17 %    % Monocytes 8 %    % Eosinophils 4 %    % Basophils 2 %    % Immature  Granulocytes 0 %    NRBCs per 100 WBC 1 (H) <1 /100    Absolute Neutrophils 9.2 (H) 1.6 - 8.3 10e3/uL    Absolute Lymphocytes 2.3 0.8 - 5.3 10e3/uL    Absolute Monocytes 1.1 0.0 - 1.3 10e3/uL    Absolute Eosinophils 0.5 0.0 - 0.7 10e3/uL    Absolute Basophils 0.2 0.0 - 0.2 10e3/uL    Absolute Immature Granulocytes 0.1 <=0.4 10e3/uL    Absolute NRBCs 0.1 10e3/uL   Comprehensive metabolic panel    Collection Time: 03/15/25  7:15 AM   Result Value Ref Range    Sodium 136 135 - 145 mmol/L    Potassium 3.5 3.4 - 5.3 mmol/L    Carbon Dioxide (CO2) 21 (L) 22 - 29 mmol/L    Anion Gap 13 7 - 15 mmol/L    Urea Nitrogen 11.5 6.0 - 20.0 mg/dL    Creatinine 0.55 0.51 - 0.95 mg/dL    GFR Estimate >90 >60 mL/min/1.73m2    Calcium 8.4 (L) 8.8 - 10.4 mg/dL    Chloride 102 98 - 107 mmol/L    Glucose 81 70 - 99 mg/dL    Alkaline Phosphatase 56 40 - 150 U/L    AST 44 0 - 45 U/L    ALT 25 0 - 50 U/L    Protein Total 7.2 6.4 - 8.3 g/dL    Albumin 4.3 3.5 - 5.2 g/dL    Bilirubin Total 2.5 (H) <=1.2 mg/dL     I reviewed the above labs today.    Assessment and Plan:  1. Sickle Cell HgbSS Disease  2. Acute flare of chronic pain  3. Iron overload  4. 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 History of CVA  6. Hearing loss  7. Mental Health       Jennifer was seen for pain medications and fluids today along with her scheduled follow up. She continues to work towards ED visit reduction. She is higher on her ED visit counts right now than previous years at this time but the tradeoff has been that she is getting less oxycodone on a regular basis which is good. We continue to encourage her to focus on taking her medications on an as-needed basis which would likely require her to go days without any oxycodone.  We will continue her current home oxycodone prescriptions of 12 tablets per week which has remained stable for the past 3 months.       HbSS with acute on chronic pain and iron overload  -Continue HU 3000 mg  daily  -Monthly crizanlizumab    Pain management  -Mix of pain medications including oxycodone, Robaxin, ibuprofen or naproxen, Tylenol. No more than 2 infusions per week. No change to oxycodone today  -Continue diligent home management with current medications, heat, rest, compression, warm baths.   -Continue to reassess plan for home oxycodone use monthly.  Continue prescriptions for 12 tablets oxycodone per week.  We can continue to decrease weekly if she desires.  We are aiming to avoid any dose or quantity escalations.   --If unable to manage at home can go to ED  with continued plan to use IV Dilaudid and take a break from ketamine (10/2/23). No PCA use and goal for any admission would still be to discharge by 5 days or less    Iron Overload  A Ferriscan was performed 1/30/25 showing a decrease in her average iron concentration, now 24 mg/g dry tissue, previously 31.8. Repeat a Ferriscan in 6 months (June 2025). She remains on Jadenu 1440 mg daily and deferiprone 2000 mg BID.    Stroke Hx  Holding off on chronic transfusions right now per mutually agreed upon management. Trying to reduce iron further before considering restart.  -Still on ASA    Mental Health  We are very pleased with her dedication to therapy over the past two months. She has found this extremely beneficial in managing interpersonal relationships with family members. Health maintenance reviewed today and up-to-date.        We will continue to see her monthly for ANDREAS/MD visits with her sidney infusions.         Patricia Mantilla CNP  ---  55 minutes spent on the date of the encounter doing chart review, review of test results, interpretation of tests, patient visit, and documentation.    The longitudinal plan of care for the diagnosis(es)/condition(s) as documented were addressed during this visit. Due to the added complexity in care, I will continue to support Jennifer in the subsequent management and with ongoing continuity of  care.                    Eric Duncan MD

## 2025-03-17 NOTE — PROGRESS NOTES
Infusion Nursing Note:  Jennifer Cervantes presents today for Patient presents with:  Infusion: Fluids, pain meds      Patient seen by provider today: No   present during visit today: No    Note: During [facility]'s [facility] appointment, orders from Eric Duncan MD  were completed.  Patient identification verified by name and date of birth.  Assessment completed.  Vitals recorded in Doc Flowsheets.  Patient reporting 9/10 pain in her leg, back abdomen with some mild nausea. Baseline ) O2 sat is low at 91%. Patient has history of hypoxia with narcotics.     Frequency: PRN  Labs: drawn per therapy plan orders    1000 ml LR given at 500 ml/hr over 2 hours  Benadryl 50 mg PO  Toradol 30 mg IVP over 2 minutes  Zofran 8 mg IVP over 3 minutes  Dilaudid 1 mg IVP given Q 1 hour x 3 doses    Administrations This Visit       diphenhydrAMINE (BENADRYL) capsule 50 mg       Admin Date  03/17/2025 Action  $Given Dose  50 mg Route  Oral Documented By  Elena Kelly RN              HYDROmorphone (DILAUDID) injection 1 mg       Admin Date  03/17/2025 Action  $Given Dose  1 mg Route  Intravenous Documented By  Elena Kelly RN               Admin Date  03/17/2025 Action  $Given Dose  1 mg Route  Intravenous Documented By  Elena Kelly RN              ketorolac (TORADOL) injection 30 mg       Admin Date  03/17/2025 Action  $Given Dose  30 mg Route  Intravenous Documented By  Eelna Kelly RN              lactated ringers BOLUS 1,000 mL       Admin Date  03/17/2025 Action  $New Bag Dose  1,000 mL Rate  500 mL/hr Route  Intravenous Documented By  Elena Kelly RN              ondansetron (ZOFRAN) injection 8 mg       Admin Date  03/17/2025 Action  $Given Dose  8 mg Route  Intravenous Documented By  Elena Kelly RN                    Intravenous Access:  Labs drawn without difficulty.  Port accessed.    Treatment Conditions:  Not Applicable.      Post Infusion  Assessment:  Patient tolerated infusion without incident.  Site patent and intact, free from redness, edema or discomfort.  No evidence of extravasations.  Access discontinued per protocol.       Discharge Plan:   Discharge instructions reviewed with: Patient.  AVS to patient via [employer].  Patient will return PRN for next appointment.   Patient discharged in stable condition accompanied by: self.  Departure Mode: Ambulatory    BP (!) 142/79 (BP Location: Left arm, Patient Position: Semi-Short's, Cuff Size: Adult Regular)   Pulse 92   Temp 97.4  F (36.3  C)   Resp 16   SpO2 (!) 91%   Elena Kelly RN on 3/17/2025 at 10:40 AM  .

## 2025-03-17 NOTE — LETTER
3/17/2025      Jennifer Cervantes  [address]  [facility] [address]      Dear Colleague,    Thank you for referring your patient, Jennifer Cervantes, to the [facility]. Please see a copy of my visit note below.    Adult Sickle Cell Outpatient Visit Note  Mar 17, 2025    Reason for Visit: routine follow-up for sickle cell disease, HgbSS. 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    Interval History:  Since the last visit with RONALDO Mantilla, she had her birthday and went to the [facility] where she was apparently quite successful for the first time. She has had three ED visits since her last one (8 total for the year), but she also said she is happy with how she has been able to stay out of the ED. She is currently on Hydrea and Jadenu and denies any missed doses. Her Ferriscan several weeks ago did show ~25% improvement in overall iron levels. She has been feeling happy overall as she is assisting in the care of her niece, now 8 months old, while her sister is managing a CPS situation. She said she has had some ups and downs with boyfriends, with her previous boyfriend being in and out of care home and a second man who has different goals in a relationship than Jennifer wants. That said, she feels good with where things are with her health. She is still doing regular infusion visits but her oxycodone pill counts have been at one of the lowest points in her time in our system. She had a chronic pain flare today so she was in for infusion     She is happy with how things are going with her therapist. She values the ability to have an outlet and person to  safely express her feelings.    She also wanted to pass on that she really appreciates the respect she gets from [employer] on a regular basis.      Sickle Cell Disease Comprehensive Checklist  Bone Health/Avascular Necrosis Screening/Symptoms (each visit): no new concerns today  Leg Ulcer evaluation (every visit): nothing to note  Hypertension (every visit):stable none for several months  Last pulmonary evaluation (asthma, AMAN, pulm HTN): 9/28/22, due for follow-up  Stroke/silent cerebral infarct Hx (Y/N): Yes TIA ~2014, first event ~age 1 with full stroke and R sided weakness  Last PCP Visit: 9/30/24 with Dr. Case  Vaccines:  PCV13: 5/13/19  Pneumovax (PPSV23): 3/04, 10/09, 7/12/19, 10/2024  Menactra: 4/2010, 9/2015 (MCV done 8/16/21)  MenB: 9/16/15, 5/13/19, 1/30/25  Influenza: 10/11/24  Audiology (chelation): done 2/27/24    Comparison to Previous Audiogram dated 6/6/2022:     Pure Tone Thresholds 250-8000 Hz:    RIGHT: stable    LEFT: stable     High Frequency Audiometry 9,000-16,000Hz:     RIGHT: stable    LEFT: stable     Word Recognition Score:    RIGHT: stable    LEFT: stable    Plan last reviewed with patient: 3/17/25    Patient background: 26 yo F, enjoys movies and kids though there are times where she does not really want to talk to people. Does not have a lot of social support at home.     Sickle Cell Disease History  Primary Hematologist Team: Jose Rafael Duncan  PCP: none  Genotype: SS  Acute Pain Crisis Treatment: (limiting IV)   ER   Dilaudid 1 mg IV q1h up to 3 doses  Toradol 30 mg IV x1   Maintenance IV fluids with LR  Other: Zofran 8 mg IV PRN nausea  Inpatient:  PCA Dilaudid 1 hr q30 minutes, no basal rate  Toradol 30 mg x6h x 4 hr  LR maintenance x 1-2 days  Other Medications: Zofran  ASA  Supportive Care: Docusate, Senna  Chronic Pain Medications:    Home regimen: oxycodone 15 mg p.o. q.4-6 hours p.r.n. breakthrough pain.  She also continues on Voltaren gel, and Zoloft among other  medications.    -Also benefits from mental health visits, acupuncture  Baseline Hemoglobin: 7 g/dl without chronic transfusions  Hydroxyurea use: Yes  H/O blood transfusions: Yes, several (iron overload) Most recent 11/20/2021  H/O Transfusion Reactions: no  Antibodies:none  H/O Acute Chest Syndrome: Yes  Last episode:9/05/22 (previously 4/26/21, 10/2019)   ICU/intubation: not with 9/2022 admission  H/O Stroke: Yes (managed with chronic transfusions in the past, stopped late Spring 2020)  H/O VTE: Yes (2/2021)  H/O Cholecystectomy or Splenectomy: no  H/O Asthma, Pulm HTN, AVN, Leg Ulcers, Nephropathy, Retinopathy, etc: Iron overload, asthma, chronic lung disease, physical limitations from early stroke    ---------------------------------------  Jennifer Cervantes's Goals (last discussed 1/30/25)    1-3 month goal:  Continue weekly therapy    6 month goal:      12 month goal:      Disease-specific goal(s):  Continue to decrease ED visits        Current Outpatient Medications   Medication Sig Dispense Refill     albuterol (PROVENTIL) (2.5 MG/3ML) 0.083% neb solution Take 1 vial (2.5 mg) by nebulization every 6 hours as needed for shortness of breath, wheezing or cough. 90 mL 0     aspirin (ASA) 81 MG chewable tablet Take 1 tablet (81 mg) by mouth 2 times daily 60 tablet 11     budesonide-formoterol (SYMBICORT/BREYNA) 160-4.5 MCG/ACT Inhaler Inhale 2 puffs twice daily plus 1-2 puffs as needed. May use up to 12 puffs per day. 20.4 g 11     deferasirox (JADENU) 360 MG tablet Take 4 tablets (1,440 mg) by mouth daily. 120 tablet 11     Deferiprone, Twice Daily, 1000 MG TABS Take 2,000 mg by mouth 2 times daily. 150 tablet 3     diclofenac (VOLTAREN) 1 % topical gel Apply 4 g topically 4 times daily as needed (moderate knee pain). 350 g 1     diphenhydrAMINE (BENADRYL) 50 MG capsule Administer 12  hours pre - IV contrast injection and 2 hours prior to contrast. Patient must have a . 2 capsule 0     EPINEPHrine (ANY BX  GENERIC EQUIV) 0.3 MG/0.3ML injection 2-pack Inject 0.3 mLs (0.3 mg) into the muscle as needed for anaphylaxis. 1 each 1     gabapentin (NEURONTIN) 300 MG capsule Take 1 capsule (300 mg) by mouth 3 times daily. Take PO 1 pill in evening (300 mg) TID to start. If tolerated, increase by 300 mg in morning or lunch every few days, updating your doctor's office in time to get refills. The maximum dose is 900 mg TID. (Patient taking differently: Take 600 mg by mouth at bedtime. Take PO 1 pill in evening (300 mg) TID to start. If tolerated, increase by 300 mg in morning or lunch every few days, updating your doctor's office in time to get refills. The maximum dose is 900 mg TID.) 90 capsule 1     hydroxyurea (HYDREA) 500 MG capsule Take 6 capsules (3,000 mg) by mouth daily 180 capsule 11     ibuprofen (ADVIL/MOTRIN) 800 MG tablet Take 800 mg by mouth every 8 hours as needed for moderate pain       methocarbamol (ROBAXIN) 750 MG tablet Take 1 tablet (750 mg) by mouth 4 times daily as needed for muscle spasms (during sickle pain crises. Okay to take scheduled while in pain). 60 tablet 1     methylPREDNISolone (MEDROL) 32 MG tablet Take 1 tab 12 hours before appointment and 1 tab 2 hours prior to the procedure with IV contrast. 2 tablet 0     naloxone (NARCAN) 4 MG/0.1ML nasal spray Spray 4 mg into one nostril alternating nostrils as needed for opioid reversal. every 2-3 minutes until assistance arrives 0.2 mL 0     naproxen (NAPROSYN) 500 MG tablet Take 1.5 tablets (750 mg) by mouth daily. 5 tablet 0     oxyCODONE (ROXICODONE) 5 MG tablet Take 1 tablet (5 mg) by mouth every 6 hours as needed. 12 tablet 0     oxyCODONE IR (ROXICODONE) 10 MG tablet Take 1 tablet (10 mg) by mouth every 6 hours as needed for moderate to severe pain. (Goal of less than 4 per day) 12 tablet 0     Past Medical History  Past Medical History:   Diagnosis Date     Anxiety      Bleeding disorder      Blood clotting disorder      Cerebral infarction (H)  2015     Cognitive developmental delay     low IQ. Please recognize when managing pain and planning with her     Depressive disorder      Hemiplegia and hemiparesis following cerebral infarction affecting right dominant side (H)     right hand contractures     Iron overload due to repeated red blood cell transfusions      Migraines      Multiple subsegmental pulmonary emboli without acute cor pulmonale (H) 02/01/2021     Oppositional defiant behavior      Presence of intrauterine contraceptive device 2/18/2020     Superficial venous thrombosis of arm, right 03/25/2021     Uncomplicated asthma      Past Surgical History:   Procedure Laterality Date     AS INSERT TUNNELED CV 2 CATH W/O PORT/PUMP       CHOLECYSTECTOMY       CV RIGHT HEART CATH MEASUREMENTS RECORDED N/A 11/18/2021    Procedure: Right Heart Cath;  Surgeon: Jackson Stauffer MD;  Location:  HEART CARDIAC CATH LAB     INSERT PORT VASCULAR ACCESS Left 4/21/2021    Procedure: INSERTION, VASCULAR ACCESS PORT (NOT SURE ON SIDE UNTIL REMOVAL);  Surgeon: Rajan More MD;  Location: [facility] OR     IR CHEST PORT PLACEMENT > 5 YRS OF AGE  4/21/2021     IR CVC NON TUNNEL LINE REMOVAL  5/6/2021     IR CVC NON TUNNEL PLACEMENT > 5 YRS  04/07/2020     IR CVC NON TUNNEL PLACEMENT > 5 YRS  4/30/2021     IR CVC NON TUNNEL PLACEMENT > 5 YRS  9/7/2022     IR PORT REMOVAL LEFT  4/21/2021     REMOVE PORT VASCULAR ACCESS Left 4/21/2021    Procedure: REMOVAL, VASCULAR ACCESS PORT LEFT;  Surgeon: Rajan More MD;  Location: [facility] OR     REPAIR TENDON ELBOW Right 10/02/2019    Procedure: Right Elbow Flexor Lengthening, Flexor Pronator Slide Of Wrist and Finger, Thumb Adductor Lengthening;  Surgeon: Anai Franco MD;  Location: [facility] OR     TONSILLECTOMY Bilateral 10/02/2019    Procedure: Bilateral Tonsillectomy;  Surgeon: Farhana Guy MD;  Location: [facility] OR     ZZC BREAST REDUCTION (INCLUDES LIPO) TIER 3 Bilateral 04/23/2019     Allergies   Allergen  Reactions     Contrast Dye Angioedema     Hives and breathing issues     Fish-Derived Products Hives     Seafood Hives     Adhesive Tape Hives     Primipore dressing causes hives     Gadolinium      Iodinated Contrast Media      Social History   Social History     Tobacco Use     Smoking status: Never     Passive exposure: Never     Smokeless tobacco: Never   Substance Use Topics     Alcohol use: Not Currently     Alcohol/week: 0.0 standard drinks of alcohol     Drug use: Never   Past medical history and social history were reviewed.    Physical Examination:  Vitals reviewed from infusion    Wt Readings from Last 10 Encounters:   03/15/25 73.5 kg (162 lb)   03/13/25 73.5 kg (162 lb)   03/08/25 73.8 kg (162 lb 11.2 oz)   02/27/25 72.6 kg (160 lb 1.6 oz)   02/23/25 72.1 kg (159 lb)   02/14/25 72.4 kg (159 lb 11.2 oz)   02/04/25 73.5 kg (162 lb 1.6 oz)   01/31/25 74.4 kg (164 lb)   01/31/25 74.5 kg (164 lb 3.2 oz)   01/30/25 73.7 kg (162 lb 8 oz)     General: Pleasant female, NAD  Eyes: EOMI, PERRL  ENT: oral mucosa moist, pink  Respiratory: normal respiratory effort on room air  Ext: no peripheral edema  Neurologic: chronic contracture of right arm and wrist. Steady gait. A/O x 4.  Skin: No rashes, petechiae, or bruising noted on exposed skin.   Laboratory Data:  Recent Results (from the past 240 hours)   EKG 12 lead    Collection Time: 03/08/25  4:29 AM   Result Value Ref Range    Systolic Blood Pressure  mmHg    Diastolic Blood Pressure  mmHg    Ventricular Rate 84 BPM    Atrial Rate 84 BPM    IA Interval 154 ms    QRS Duration 74 ms     ms    QTc 472 ms    P Axis 70 degrees    R AXIS 50 degrees    T Axis 47 degrees    Interpretation ECG       Unconfirmed report - interpretation of this ECG is computer generated - see medical record for final interpretation  Sinus rhythm  Normal ECG    Confirmed by - EMERGENCY ROOM, PHYSICIAN ([id number]),  Lashanda Mckeon ([id number]) on 3/8/2025 2:32:45 PM     Basic metabolic  panel    Collection Time: 03/08/25  4:55 AM   Result Value Ref Range    Sodium 138 135 - 145 mmol/L    Potassium 3.7 3.4 - 5.3 mmol/L    Chloride 104 98 - 107 mmol/L    Carbon Dioxide (CO2) 24 22 - 29 mmol/L    Anion Gap 10 7 - 15 mmol/L    Urea Nitrogen 6.9 6.0 - 20.0 mg/dL    Creatinine 0.63 0.51 - 0.95 mg/dL    GFR Estimate >90 >60 mL/min/1.73m2    Calcium 8.6 (L) 8.8 - 10.4 mg/dL    Glucose 91 70 - 99 mg/dL   Reticulocyte count    Collection Time: 03/08/25  4:55 AM   Result Value Ref Range    % Reticulocyte 21.8 (H) 0.5 - 2.0 %    Absolute Reticulocyte 0.476 (H) 0.025 - 0.095 10e6/uL   Troponin T, High Sensitivity    Collection Time: 03/08/25  4:55 AM   Result Value Ref Range    Troponin T, High Sensitivity <6 <=14 ng/L   CBC with platelets and differential    Collection Time: 03/08/25  4:55 AM   Result Value Ref Range    WBC Count 12.4 (H) 4.0 - 11.0 10e3/uL    RBC Count 2.20 (L) 3.80 - 5.20 10e6/uL    Hemoglobin 6.6 (LL) 11.7 - 15.7 g/dL    Hematocrit 19.3 (L) 35.0 - 47.0 %    MCV 88 78 - 100 fL    MCH 30.0 26.5 - 33.0 pg    MCHC 34.2 31.5 - 36.5 g/dL    RDW 20.9 (H) 10.0 - 15.0 %    Platelet Count 540 (H) 150 - 450 10e3/uL    % Neutrophils 68 %    % Lymphocytes 20 %    % Monocytes 8 %    % Eosinophils 3 %    % Basophils 2 %    % Immature Granulocytes 1 %    NRBCs per 100 WBC 1 (H) <1 /100    Absolute Neutrophils 8.4 (H) 1.6 - 8.3 10e3/uL    Absolute Lymphocytes 2.5 0.8 - 5.3 10e3/uL    Absolute Monocytes 0.9 0.0 - 1.3 10e3/uL    Absolute Eosinophils 0.3 0.0 - 0.7 10e3/uL    Absolute Basophils 0.2 0.0 - 0.2 10e3/uL    Absolute Immature Granulocytes 0.1 <=0.4 10e3/uL    Absolute NRBCs 0.2 10e3/uL   Reticulocyte count    Collection Time: 03/10/25  9:15 AM   Result Value Ref Range    % Reticulocyte 23.1 (H) 0.5 - 2.0 %    Absolute Reticulocyte 0.576 (H) 0.025 - 0.095 10e6/uL   Comprehensive metabolic panel    Collection Time: 03/10/25  9:15 AM   Result Value Ref Range    Sodium 138 135 - 145 mmol/L    Potassium  3.7 3.4 - 5.3 mmol/L    Carbon Dioxide (CO2) 21 (L) 22 - 29 mmol/L    Anion Gap 10 7 - 15 mmol/L    Urea Nitrogen 6.6 6.0 - 20.0 mg/dL    Creatinine 0.54 0.51 - 0.95 mg/dL    GFR Estimate >90 >60 mL/min/1.73m2    Calcium 8.9 8.8 - 10.4 mg/dL    Chloride 107 98 - 107 mmol/L    Glucose 93 70 - 99 mg/dL    Alkaline Phosphatase 57 40 - 150 U/L    AST 41 0 - 45 U/L    ALT 27 0 - 50 U/L    Protein Total 7.6 6.4 - 8.3 g/dL    Albumin 4.3 3.5 - 5.2 g/dL    Bilirubin Total 2.2 (H) <=1.2 mg/dL   CBC with platelets and differential    Collection Time: 03/10/25  9:15 AM   Result Value Ref Range    WBC Count 13.0 (H) 4.0 - 11.0 10e3/uL    RBC Count 2.45 (L) 3.80 - 5.20 10e6/uL    Hemoglobin 7.5 (L) 11.7 - 15.7 g/dL    Hematocrit 22.1 (L) 35.0 - 47.0 %    MCV 90 78 - 100 fL    MCH 30.6 26.5 - 33.0 pg    MCHC 33.9 31.5 - 36.5 g/dL    RDW 22.0 (H) 10.0 - 15.0 %    Platelet Count 617 (H) 150 - 450 10e3/uL    % Neutrophils 73 %    % Lymphocytes 16 %    % Monocytes 6 %    % Eosinophils 3 %    % Basophils 2 %    % Immature Granulocytes 0 %    NRBCs per 100 WBC 2 (H) <1 /100    Absolute Neutrophils 9.5 (H) 1.6 - 8.3 10e3/uL    Absolute Lymphocytes 2.0 0.8 - 5.3 10e3/uL    Absolute Monocytes 0.8 0.0 - 1.3 10e3/uL    Absolute Eosinophils 0.4 0.0 - 0.7 10e3/uL    Absolute Basophils 0.3 (H) 0.0 - 0.2 10e3/uL    Absolute Immature Granulocytes 0.1 <=0.4 10e3/uL    Absolute NRBCs 0.3 10e3/uL   Reticulocyte count    Collection Time: 03/15/25  6:40 AM   Result Value Ref Range    % Reticulocyte 19.8 (H) 0.5 - 2.0 %    Absolute Reticulocyte 0.425 (H) 0.025 - 0.095 10e6/uL   CBC with platelets and differential    Collection Time: 03/15/25  6:40 AM   Result Value Ref Range    WBC Count 13.4 (H) 4.0 - 11.0 10e3/uL    RBC Count 2.15 (L) 3.80 - 5.20 10e6/uL    Hemoglobin 6.8 (LL) 11.7 - 15.7 g/dL    Hematocrit 18.2 (L) 35.0 - 47.0 %    MCV 85 78 - 100 fL    MCH 31.6 26.5 - 33.0 pg    MCHC 37.4 (H) 31.5 - 36.5 g/dL    RDW 21.2 (H) 10.0 - 15.0 %     Platelet Count 500 (H) 150 - 450 10e3/uL    % Neutrophils 69 %    % Lymphocytes 17 %    % Monocytes 8 %    % Eosinophils 4 %    % Basophils 2 %    % Immature Granulocytes 0 %    NRBCs per 100 WBC 1 (H) <1 /100    Absolute Neutrophils 9.2 (H) 1.6 - 8.3 10e3/uL    Absolute Lymphocytes 2.3 0.8 - 5.3 10e3/uL    Absolute Monocytes 1.1 0.0 - 1.3 10e3/uL    Absolute Eosinophils 0.5 0.0 - 0.7 10e3/uL    Absolute Basophils 0.2 0.0 - 0.2 10e3/uL    Absolute Immature Granulocytes 0.1 <=0.4 10e3/uL    Absolute NRBCs 0.1 10e3/uL   Comprehensive metabolic panel    Collection Time: 03/15/25  7:15 AM   Result Value Ref Range    Sodium 136 135 - 145 mmol/L    Potassium 3.5 3.4 - 5.3 mmol/L    Carbon Dioxide (CO2) 21 (L) 22 - 29 mmol/L    Anion Gap 13 7 - 15 mmol/L    Urea Nitrogen 11.5 6.0 - 20.0 mg/dL    Creatinine 0.55 0.51 - 0.95 mg/dL    GFR Estimate >90 >60 mL/min/1.73m2    Calcium 8.4 (L) 8.8 - 10.4 mg/dL    Chloride 102 98 - 107 mmol/L    Glucose 81 70 - 99 mg/dL    Alkaline Phosphatase 56 40 - 150 U/L    AST 44 0 - 45 U/L    ALT 25 0 - 50 U/L    Protein Total 7.2 6.4 - 8.3 g/dL    Albumin 4.3 3.5 - 5.2 g/dL    Bilirubin Total 2.5 (H) <=1.2 mg/dL     I reviewed the above labs today.    Assessment and Plan:  1. Sickle Cell HgbSS Disease  2. Acute flare of chronic pain  3. Iron overload  4. 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 History of CVA  6. Hearing loss  7. Mental Health       Jennifer was seen for pain medications and fluids today along with her scheduled follow up. She continues to work towards ED visit reduction. She is higher on her ED visit counts right now than previous years at this time but the tradeoff has been that she is getting less oxycodone on a regular basis which is good. We continue to encourage her to focus on taking her medications on an as-needed basis which would likely require her to go days without any oxycodone.  We will continue her current home oxycodone  prescriptions of 12 tablets per week which has remained stable for the past 3 months.       HbSS with acute on chronic pain and iron overload  -Continue HU 3000 mg daily  -Monthly crizanlizumab    Pain management  -Mix of pain medications including oxycodone, Robaxin, ibuprofen or naproxen, Tylenol. No more than 2 infusions per week. No change to oxycodone today  -Continue diligent home management with current medications, heat, rest, compression, warm baths.   -Continue to reassess plan for home oxycodone use monthly.  Continue prescriptions for 12 tablets oxycodone per week.  We can continue to decrease weekly if she desires.  We are aiming to avoid any dose or quantity escalations.   --If unable to manage at home can go to ED  with continued plan to use IV Dilaudid and take a break from ketamine (10/2/23). No PCA use and goal for any admission would still be to discharge by 5 days or less    Iron Overload  A Ferriscan was performed 1/30/25 showing a decrease in her average iron concentration, now 24 mg/g dry tissue, previously 31.8. Repeat a Ferriscan in 6 months (June 2025). She remains on Jadenu 1440 mg daily and deferiprone 2000 mg BID.    Stroke Hx  Holding off on chronic transfusions right now per mutually agreed upon management. Trying to reduce iron further before considering restart.  -Still on [facility]    Mental Health  We are very pleased with her dedication to therapy over the past two months. She has found this extremely beneficial in managing interpersonal relationships with family members. Health maintenance reviewed today and up-to-date.        We will continue to see her monthly for ANDREAS/MD visits with her sidney infusions.         Patricia Mantilla CNP  ---  55 minutes spent on the date of the encounter doing chart review, review of test results, interpretation of tests, patient visit, and documentation.    The longitudinal plan of care for the diagnosis(es)/condition(s) as documented were addressed during  this visit. Due to the added complexity in care, I will continue to support Jennifer in the subsequent management and with ongoing continuity of care.                  Again, thank you for allowing me to participate in the care of your patient.        Sincerely,        Eric Duncan MD    Electronically signed

## 2025-03-17 NOTE — PATIENT INSTRUCTIONS
Dear Jennifer Cervantes    Thank you for choosing [facility] ([facility] for your infusion.  The following information is a summary of our appointment as well as important reminders.        If you are a transplant patient and require transplant education, please click on this link: [email or web address].    If you have any questions on your upcoming Specialty Infusion appointments, please call scheduling at [phone].  It was a pleasure taking care of you today.    Sincerely,    [facility]  [address] [place] [address]  [id number]  Phone:  [phone]

## 2025-03-17 NOTE — PROGRESS NOTES
DATE/TIME OF CALL RECEIVED FROM LAB:  03/17/25 at 10:24 AM   LAB TEST:  Hgb  LAB VALUE:  6.8  PROVIDER NOTIFIED?: Yes  PROVIDER NAME:   DATE/TIME LAB VALUE REPORTED TO PROVIDER: Dr. Duncan  MECHANISM OF PROVIDER NOTIFICATION:  voicera message sent  PROVIDER RESPONSE: Ok thank you. Nothing to do right now

## 2025-03-19 ENCOUNTER — THERAPY VISIT (OUTPATIENT)
Dept: PHYSICAL THERAPY | Facility: CLINIC | Age: 25
End: 2025-03-19
Attending: PEDIATRICS
Payer: COMMERCIAL

## 2025-03-19 ENCOUNTER — INFUSION THERAPY VISIT (OUTPATIENT)
Dept: INFUSION THERAPY | Facility: CLINIC | Age: 25
End: 2025-03-19
Attending: PEDIATRICS
Payer: COMMERCIAL

## 2025-03-19 ENCOUNTER — PATIENT OUTREACH (OUTPATIENT)
Dept: ONCOLOGY | Facility: CLINIC | Age: 25
End: 2025-03-19

## 2025-03-19 ENCOUNTER — NURSE TRIAGE (OUTPATIENT)
Dept: ONCOLOGY | Facility: CLINIC | Age: 25
End: 2025-03-19

## 2025-03-19 VITALS
OXYGEN SATURATION: 91 % | SYSTOLIC BLOOD PRESSURE: 127 MMHG | TEMPERATURE: 97.7 F | RESPIRATION RATE: 18 BRPM | HEART RATE: 79 BPM | DIASTOLIC BLOOD PRESSURE: 76 MMHG

## 2025-03-19 DIAGNOSIS — D57.00 SICKLE CELL PAIN CRISIS (H): Primary | ICD-10-CM

## 2025-03-19 DIAGNOSIS — M25.562 ACUTE PAIN OF LEFT KNEE: Primary | ICD-10-CM

## 2025-03-19 DIAGNOSIS — G81.10 SPASTIC HEMIPLEGIA, UNSPECIFIED ETIOLOGY, UNSPECIFIED LATERALITY (H): ICD-10-CM

## 2025-03-19 PROCEDURE — 97530 THERAPEUTIC ACTIVITIES: CPT | Mod: GP

## 2025-03-19 PROCEDURE — 250N000011 HC RX IP 250 OP 636: Performed by: PEDIATRICS

## 2025-03-19 PROCEDURE — 96375 TX/PRO/DX INJ NEW DRUG ADDON: CPT

## 2025-03-19 PROCEDURE — 250N000013 HC RX MED GY IP 250 OP 250 PS 637: Performed by: PEDIATRICS

## 2025-03-19 PROCEDURE — 96376 TX/PRO/DX INJ SAME DRUG ADON: CPT

## 2025-03-19 PROCEDURE — 258N000003 HC RX IP 258 OP 636: Performed by: PEDIATRICS

## 2025-03-19 PROCEDURE — 96361 HYDRATE IV INFUSION ADD-ON: CPT

## 2025-03-19 PROCEDURE — 96374 THER/PROPH/DIAG INJ IV PUSH: CPT

## 2025-03-19 PROCEDURE — 97110 THERAPEUTIC EXERCISES: CPT | Mod: GP

## 2025-03-19 RX ORDER — ONDANSETRON 2 MG/ML
8 INJECTION INTRAMUSCULAR; INTRAVENOUS EVERY 6 HOURS PRN
Start: 2025-04-01

## 2025-03-19 RX ORDER — HEPARIN SODIUM (PORCINE) LOCK FLUSH IV SOLN 100 UNIT/ML 100 UNIT/ML
5 SOLUTION INTRAVENOUS
OUTPATIENT
Start: 2025-04-01

## 2025-03-19 RX ORDER — DIPHENHYDRAMINE HCL 25 MG
50 CAPSULE ORAL
Start: 2025-04-01

## 2025-03-19 RX ORDER — ONDANSETRON 4 MG/1
8 TABLET, FILM COATED ORAL
Start: 2025-04-01

## 2025-03-19 RX ORDER — KETOROLAC TROMETHAMINE 30 MG/ML
30 INJECTION, SOLUTION INTRAMUSCULAR; INTRAVENOUS ONCE
Status: COMPLETED | OUTPATIENT
Start: 2025-03-19 | End: 2025-03-19

## 2025-03-19 RX ORDER — ONDANSETRON 2 MG/ML
8 INJECTION INTRAMUSCULAR; INTRAVENOUS EVERY 6 HOURS PRN
Status: DISCONTINUED | OUTPATIENT
Start: 2025-03-19 | End: 2025-03-19 | Stop reason: HOSPADM

## 2025-03-19 RX ORDER — DIPHENHYDRAMINE HCL 25 MG
50 CAPSULE ORAL
Status: COMPLETED | OUTPATIENT
Start: 2025-03-19 | End: 2025-03-19

## 2025-03-19 RX ORDER — HEPARIN SODIUM,PORCINE 10 UNIT/ML
5 VIAL (ML) INTRAVENOUS
OUTPATIENT
Start: 2025-04-01

## 2025-03-19 RX ORDER — KETOROLAC TROMETHAMINE 30 MG/ML
30 INJECTION, SOLUTION INTRAMUSCULAR; INTRAVENOUS ONCE
Start: 2025-04-01 | End: 2025-04-01

## 2025-03-19 RX ORDER — HEPARIN SODIUM (PORCINE) LOCK FLUSH IV SOLN 100 UNIT/ML 100 UNIT/ML
5 SOLUTION INTRAVENOUS
Status: DISCONTINUED | OUTPATIENT
Start: 2025-03-19 | End: 2025-03-19 | Stop reason: HOSPADM

## 2025-03-19 RX ADMIN — HEPARIN 5 ML: 100 SYRINGE at 13:07

## 2025-03-19 RX ADMIN — HYDROMORPHONE HYDROCHLORIDE 1 MG: 1 INJECTION, SOLUTION INTRAMUSCULAR; INTRAVENOUS; SUBCUTANEOUS at 11:35

## 2025-03-19 RX ADMIN — HYDROMORPHONE HYDROCHLORIDE 1 MG: 1 INJECTION, SOLUTION INTRAMUSCULAR; INTRAVENOUS; SUBCUTANEOUS at 12:37

## 2025-03-19 RX ADMIN — SODIUM CHLORIDE, POTASSIUM CHLORIDE, SODIUM LACTATE AND CALCIUM CHLORIDE 1000 ML: 600; 310; 30; 20 INJECTION, SOLUTION INTRAVENOUS at 10:25

## 2025-03-19 RX ADMIN — KETOROLAC TROMETHAMINE 30 MG: 30 INJECTION, SOLUTION INTRAMUSCULAR; INTRAVENOUS at 10:31

## 2025-03-19 RX ADMIN — HYDROMORPHONE HYDROCHLORIDE 1 MG: 1 INJECTION, SOLUTION INTRAMUSCULAR; INTRAVENOUS; SUBCUTANEOUS at 10:31

## 2025-03-19 RX ADMIN — ONDANSETRON 8 MG: 2 INJECTION INTRAMUSCULAR; INTRAVENOUS at 10:31

## 2025-03-19 RX ADMIN — DIPHENHYDRAMINE HYDROCHLORIDE 50 MG: 25 CAPSULE ORAL at 10:31

## 2025-03-19 NOTE — PROGRESS NOTES
[facility]: Cancer Care Follow-Up Note                                    Discussion with Patient:                                                      Spoke with pt.  She wanted to discuss her last visit with Dr Duncan and the note written.  She also broke her right arm splint and would like assistance to have that fixed.  Pradipmarcus sent with orthotics info and how to schedule.    Writer spoke with Dr Duncan and made him aware about his last note.           Goals          General     Pain Management (pt-stated)      Notes - Note created  10/13/2023  3:51 PM by Arely Krueger RN     Goal Statement: I will establish a plan for preventing and managing pain.  Date Goal set: 10/13/2023  Barriers: disease burden, multiple diagnoses, and transportation  Strengths: motivation, health awareness, and involvement with care team  Date to Achieve By: ongoing  Patient expressed understanding of goal: Yes  Action steps to achieve this goal:  I will take medications as prescribed.   I will call triage with new or worsening pain not controlled by medication.   I will use alternative therapies as directed. (Ice, heat, massage, etc)   I will establish care will palliative care department for ongoing pain management as needed.   I will call triage for medication refills when there are 2-3 days of medication remaining.                 Dates of Treatment:                                                      Infusion given in last 28 days       None            Assessment:                                                        Plan of Care Education Review:   Assessment completed with:: Patient    Plan of Care Education   Diagnosis:: Sickle Cell Disease  Does patient understand diagnosis?: Yes  Tx plan/regimen:: PRN oxy/infusion  Does patient understand treatment plan/regimen?: Yes  Plan of Care:: Lab appointment, MD follow-up appointment, ANDREAS follow-up appointment, Treatment schedule  When to call provider:: Increased shortness of  breath, New/worsening pain, Temperature >100.4F    Evaluation of Learning  Patient Education Provided: Yes  Readiness:: Acceptance  Method:: Explanation  Response:: Verbalizes understanding           Intervention/Education provided during outreach:                                                         Patient to follow up as scheduled at next appt  Patient to call/[employer] message with updates  Confirmed patient has clinic and triage numbers    Signature:  Arely Krueger RN

## 2025-03-19 NOTE — TELEPHONE ENCOUNTER
Oncology Nurse Triage - Sickle Cell Pain Crisis:    Situation: Jennifer  calling about Sickle Cell Pain Crisis, requesting to be added on for IV fluids and pain medicine    Background:   Patient's last infusion was 3/17/25  Last clinic visit date:3/17/25 w/ Dr. Duncan   Does patient have active treatment plan? Yes    Assessment of Symptoms:  Onset/Duration of symptoms: 2 day    Is it typical sickle cell pain? Yes  Location: left shoulder, left arm  Character: Sharp  Intensity: 7/10    Any radiation of pain, numbness, tingling, weakness, warmth, swelling, discoloration of arms or legs? No    Fever? No    Chest Pain Present: No    Shortness of breath: No    Other home therapies tried: HEAT/HEATING PAD and WARM BATH     Last home medication taken and when: 10pm last night Oxycodone 10mg    Any Refills Needed?: No    Does patient have transportation & length of time to get to clinic: Yes- pt will be here at 9am for another appt. If appt is offered after she leaves, may transportation set up. Call pt to double check.     Recommendations:   Added to infusion wait list.     If you do not hear from the infusion center by 2pm then you will not be able to get in for an infusion today. If symptoms worsen while waiting for call back, and/or you experience fever, chills, SOB, chest pain, cough, n/v, dizziness, numbness, swelling, discoloration of extremities, then seek emergency evaluation in Emergency Department.     Please note, if you are late for your appt, you risk losing your infusion appt as it may delay another patient's infusion who arrived on time.

## 2025-03-19 NOTE — TELEPHONE ENCOUNTER
Jennifer is calling to ask if there are any opening today before she leaves to go home after 0900 apt.   Message sent to Infusion and BMT, [facility] are still full.   Called [facility] and per Roz, they can offer apt now.   Pt informed and will go to [facility] now.  Urgent message sent to [facility] to schedule pt for infusion at 1000 in [facility], IVF/pm.    Message routed to Care Team.

## 2025-03-19 NOTE — PROGRESS NOTES
Infusion Nursing Note:  Jennifer Cervantes presents today for IV fluids, pain medication, antiemetics.    Patient seen by provider today: No   present during visit today: Not Applicable.    Note:   1L LR over 2 hours.  IV Dilaudid x3.  IV Toradol.  IV Zofran.  PO Benadryl.    Intravenous Access:  Implanted Port.    Treatment Conditions:  Not Applicable.    Post Infusion Assessment:  Patient tolerated infusion without incident.  Blood return noted pre and post infusion.  Site patent and intact, free from redness, edema or discomfort.  No evidence of extravasations.  Access discontinued per protocol.     Discharge Plan:   Patient and/or family verbalized understanding of discharge instructions and all questions answered.  AVS to patient via MYCHART.    Patient discharged in stable condition accompanied by: self.  Departure Mode: Ambulatory.    Administrations This Visit       diphenhydrAMINE (BENADRYL) capsule 50 mg       Admin Date  03/19/2025 Action  $Given Dose  50 mg Route  Oral Documented By  Echo Perez RN              heparin lock flush 100 unit/mL injection 5 mL       Admin Date  03/19/2025 Action  $Given Dose  5 mL Route  Intracatheter Documented By  Echo Perez RN              HYDROmorphone (DILAUDID) injection 1 mg       Admin Date  03/19/2025 Action  $Given Dose  1 mg Route  Intravenous Documented By  Echo Perez RN               Admin Date  03/19/2025 Action  $Given Dose  1 mg Route  Intravenous Documented By  Echo Perez RN               Admin Date  03/19/2025 Action  $Given Dose  1 mg Route  Intravenous Documented By  Echo Perez RN              ketorolac (TORADOL) injection 30 mg       Admin Date  03/19/2025 Action  $Given Dose  30 mg Route  Intravenous Documented By  Echo Perez RN              lactated ringers BOLUS 1,000 mL       Admin Date  03/19/2025 Action  $New Bag Dose  1,000 mL Rate  500 mL/hr Route  Intravenous Documented By  Echo Perez  RN              ondansetron (ZOFRAN) injection 8 mg       Admin Date  03/19/2025 Action  $Given Dose  8 mg Route  Intravenous Documented By  Echo Perez RN                  /85 (BP Location: Left arm)   Pulse 80   Temp 97.7  F (36.5  C) (Oral)   Resp 16     Echo SCHROEDER. Ana, RN

## 2025-03-19 NOTE — PATIENT INSTRUCTIONS
Dear Jennifer Cervantes    Thank you for choosing [facility] ([facility]) for your infusion.  The following information is a summary of our appointment as well as important reminders.      We look forward in seeing you on your next appointment here at [facility] ([facility]).  Please don t hesitate to call us at [phone] to reschedule any of your appointments or to speak with one of the [facility] registered nurses.  It was a pleasure taking care of you today.    Sincerely,    [facility]  [facility]  [address] [place] [address]  [id number]  Phone:  [phone]

## 2025-03-21 ENCOUNTER — HOSPITAL ENCOUNTER (EMERGENCY)
Facility: CLINIC | Age: 25
Discharge: HOME | End: 2025-03-21
Admitting: PHYSICIAN ASSISTANT
Payer: COMMERCIAL

## 2025-03-21 VITALS
HEART RATE: 90 BPM | RESPIRATION RATE: 16 BRPM | OXYGEN SATURATION: 93 % | TEMPERATURE: 98 F | DIASTOLIC BLOOD PRESSURE: 85 MMHG | SYSTOLIC BLOOD PRESSURE: 125 MMHG

## 2025-03-21 DIAGNOSIS — D57.00 SICKLE CELL DISEASE WITH CRISIS (H): ICD-10-CM

## 2025-03-21 LAB
ALBUMIN SERPL BCG-MCNC: 4.7 G/DL (ref 3.5–5.2)
ALP SERPL-CCNC: 63 U/L (ref 40–150)
ALT SERPL W P-5'-P-CCNC: 21 U/L (ref 0–50)
ANION GAP SERPL CALCULATED.3IONS-SCNC: 11 MMOL/L (ref 7–15)
AST SERPL W P-5'-P-CCNC: 37 U/L (ref 0–45)
BASOPHILS # BLD AUTO: 0.2 10E3/UL (ref 0–0.2)
BASOPHILS NFR BLD AUTO: 2 %
BILIRUB SERPL-MCNC: 2.9 MG/DL
BUN SERPL-MCNC: 6.7 MG/DL (ref 6–20)
CALCIUM SERPL-MCNC: 8.9 MG/DL (ref 8.8–10.4)
CHLORIDE SERPL-SCNC: 106 MMOL/L (ref 98–107)
CREAT SERPL-MCNC: 0.54 MG/DL (ref 0.51–0.95)
EGFRCR SERPLBLD CKD-EPI 2021: >90 ML/MIN/1.73M2
EOSINOPHIL # BLD AUTO: 0.4 10E3/UL (ref 0–0.7)
EOSINOPHIL NFR BLD AUTO: 4 %
ERYTHROCYTE [DISTWIDTH] IN BLOOD BY AUTOMATED COUNT: 23.4 % (ref 10–15)
GLUCOSE SERPL-MCNC: 83 MG/DL (ref 70–99)
HCG SERPL QL: NEGATIVE
HCO3 SERPL-SCNC: 22 MMOL/L (ref 22–29)
HCT VFR BLD AUTO: 20.6 % (ref 35–47)
HGB BLD-MCNC: 7.1 G/DL (ref 11.7–15.7)
IMM GRANULOCYTES # BLD: 0 10E3/UL
IMM GRANULOCYTES NFR BLD: 0 %
LACTATE SERPL-SCNC: 0.6 MMOL/L (ref 0.7–2)
LIPASE SERPL-CCNC: 23 U/L (ref 13–60)
LYMPHOCYTES # BLD AUTO: 1.6 10E3/UL (ref 0.8–5.3)
LYMPHOCYTES NFR BLD AUTO: 16 %
MCH RBC QN AUTO: 30.6 PG (ref 26.5–33)
MCHC RBC AUTO-ENTMCNC: 34.5 G/DL (ref 31.5–36.5)
MCV RBC AUTO: 89 FL (ref 78–100)
MONOCYTES # BLD AUTO: 0.9 10E3/UL (ref 0–1.3)
MONOCYTES NFR BLD AUTO: 9 %
NEUTROPHILS # BLD AUTO: 7.1 10E3/UL (ref 1.6–8.3)
NEUTROPHILS NFR BLD AUTO: 70 %
NRBC # BLD AUTO: 0.3 10E3/UL
NRBC BLD AUTO-RTO: 3 /100
PLATELET # BLD AUTO: 482 10E3/UL (ref 150–450)
POTASSIUM SERPL-SCNC: 3.9 MMOL/L (ref 3.4–5.3)
PROT SERPL-MCNC: 7.9 G/DL (ref 6.4–8.3)
RBC # BLD AUTO: 2.32 10E6/UL (ref 3.8–5.2)
SODIUM SERPL-SCNC: 139 MMOL/L (ref 135–145)
WBC # BLD AUTO: 10.1 10E3/UL (ref 4–11)

## 2025-03-21 PROCEDURE — 250N000011 HC RX IP 250 OP 636: Performed by: PHYSICIAN ASSISTANT

## 2025-03-21 PROCEDURE — 96376 TX/PRO/DX INJ SAME DRUG ADON: CPT | Performed by: PHYSICIAN ASSISTANT

## 2025-03-21 PROCEDURE — 84703 CHORIONIC GONADOTROPIN ASSAY: CPT | Performed by: PHYSICIAN ASSISTANT

## 2025-03-21 PROCEDURE — 36415 COLL VENOUS BLD VENIPUNCTURE: CPT | Performed by: PHYSICIAN ASSISTANT

## 2025-03-21 PROCEDURE — 99284 EMERGENCY DEPT VISIT MOD MDM: CPT | Mod: 25 | Performed by: PHYSICIAN ASSISTANT

## 2025-03-21 PROCEDURE — 258N000003 HC RX IP 258 OP 636: Performed by: PHYSICIAN ASSISTANT

## 2025-03-21 PROCEDURE — 83605 ASSAY OF LACTIC ACID: CPT | Performed by: PHYSICIAN ASSISTANT

## 2025-03-21 PROCEDURE — 99285 EMERGENCY DEPT VISIT HI MDM: CPT | Performed by: PHYSICIAN ASSISTANT

## 2025-03-21 PROCEDURE — 96375 TX/PRO/DX INJ NEW DRUG ADDON: CPT | Performed by: PHYSICIAN ASSISTANT

## 2025-03-21 PROCEDURE — 85025 COMPLETE CBC W/AUTO DIFF WBC: CPT | Performed by: PHYSICIAN ASSISTANT

## 2025-03-21 PROCEDURE — 80053 COMPREHEN METABOLIC PANEL: CPT | Performed by: PHYSICIAN ASSISTANT

## 2025-03-21 PROCEDURE — 96361 HYDRATE IV INFUSION ADD-ON: CPT | Performed by: PHYSICIAN ASSISTANT

## 2025-03-21 PROCEDURE — 96374 THER/PROPH/DIAG INJ IV PUSH: CPT | Performed by: PHYSICIAN ASSISTANT

## 2025-03-21 PROCEDURE — 83690 ASSAY OF LIPASE: CPT | Performed by: PHYSICIAN ASSISTANT

## 2025-03-21 RX ORDER — KETOROLAC TROMETHAMINE 30 MG/ML
30 INJECTION, SOLUTION INTRAMUSCULAR; INTRAVENOUS ONCE
Status: COMPLETED | OUTPATIENT
Start: 2025-03-21 | End: 2025-03-21

## 2025-03-21 RX ORDER — ONDANSETRON 2 MG/ML
4 INJECTION INTRAMUSCULAR; INTRAVENOUS ONCE
Status: COMPLETED | OUTPATIENT
Start: 2025-03-21 | End: 2025-03-21

## 2025-03-21 RX ORDER — HEPARIN SODIUM (PORCINE) LOCK FLUSH IV SOLN 100 UNIT/ML 100 UNIT/ML
5 SOLUTION INTRAVENOUS ONCE
Status: COMPLETED | OUTPATIENT
Start: 2025-03-21 | End: 2025-03-21

## 2025-03-21 RX ADMIN — HYDROMORPHONE HYDROCHLORIDE 1 MG: 1 INJECTION, SOLUTION INTRAMUSCULAR; INTRAVENOUS; SUBCUTANEOUS at 17:24

## 2025-03-21 RX ADMIN — HYDROMORPHONE HYDROCHLORIDE 1 MG: 1 INJECTION, SOLUTION INTRAMUSCULAR; INTRAVENOUS; SUBCUTANEOUS at 15:49

## 2025-03-21 RX ADMIN — HEPARIN 5 ML: 100 SYRINGE at 18:30

## 2025-03-21 RX ADMIN — ONDANSETRON 4 MG: 2 INJECTION INTRAMUSCULAR; INTRAVENOUS at 17:24

## 2025-03-21 RX ADMIN — SODIUM CHLORIDE, POTASSIUM CHLORIDE, SODIUM LACTATE AND CALCIUM CHLORIDE 1000 ML: 600; 310; 30; 20 INJECTION, SOLUTION INTRAVENOUS at 14:38

## 2025-03-21 RX ADMIN — KETOROLAC TROMETHAMINE 30 MG: 30 INJECTION, SOLUTION INTRAMUSCULAR at 14:41

## 2025-03-21 RX ADMIN — HYDROMORPHONE HYDROCHLORIDE 1 MG: 1 INJECTION, SOLUTION INTRAMUSCULAR; INTRAVENOUS; SUBCUTANEOUS at 14:40

## 2025-03-21 ASSESSMENT — ACTIVITIES OF DAILY LIVING (ADL)
ADLS_ACUITY_SCORE: 58

## 2025-03-21 NOTE — ED TRIAGE NOTES
States since this morning around 8 am she has been having abd pain and vomited twice she took ibuprofen which didn't help as well as her other meds. She states this pain has been an on going thing but was better until today it came back.  She states its mainly upper but it is the entire abd.  Denies diarrhea or urinary sx.  She describes it has notes in her stomach.

## 2025-03-21 NOTE — ED PROVIDER NOTES
[facility] EMERGENCY DEPARTMENT ([facility])    3/21/25      ED PROVIDER NOTE     History     Chief Complaint   Patient presents with    Abdominal Pain    Sickle Cell Pain Crisis     HPI  25f PMHx sickle cell disease p/w abdominal pain consistent with prior sickle crisis pain. Woke with generalized abd pain today.  Has been associated with 2 episodes of vomiting.  No diarrhea, urinary symptoms, vaginal symptoms, CP, SOB, cough, fever, chills, new foods or exposures.  Patient states that she has had abdominal pain similar to this in the past with sickle crisis.  She denies other pain.  Last BM this morning and normal.  PSHx cholecystectomy.  Attempted home oxycodone ibuprofen without improvement.    Past Medical History  Past Medical History:   Diagnosis Date    Anxiety     Bleeding disorder     Blood clotting disorder     Cerebral infarction (H) 2015    Cognitive developmental delay     low IQ. 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 side (H)     right hand contractures    Iron overload due to repeated red blood cell transfusions     Migraines     Multiple subsegmental pulmonary emboli without acute cor pulmonale (H) 02/01/2021    Oppositional defiant behavior     Presence of intrauterine contraceptive device 2/18/2020    Superficial venous thrombosis of arm, right 03/25/2021    Uncomplicated asthma      Past Surgical History:   Procedure Laterality Date    AS INSERT TUNNELED CV 2 CATH W/O PORT/PUMP      CHOLECYSTECTOMY      CV RIGHT HEART CATH MEASUREMENTS RECORDED N/A 11/18/2021    Procedure: Right Heart Cath;  Surgeon: Jackson Stauffer MD;  Location:  HEART CARDIAC CATH LAB    INSERT PORT VASCULAR ACCESS Left 4/21/2021    Procedure: INSERTION, VASCULAR ACCESS PORT (NOT SURE ON SIDE UNTIL REMOVAL);  Surgeon: Rajan More MD;  Location: [facility] OR    IR CHEST PORT PLACEMENT > 5  YRS OF AGE  4/21/2021    IR CVC NON TUNNEL LINE REMOVAL  5/6/2021    IR CVC NON TUNNEL PLACEMENT > 5 YRS  04/07/2020    IR CVC NON TUNNEL PLACEMENT > 5 YRS  4/30/2021    IR CVC NON TUNNEL PLACEMENT > 5 YRS  9/7/2022    IR PORT REMOVAL LEFT  4/21/2021    REMOVE PORT VASCULAR ACCESS Left 4/21/2021    Procedure: REMOVAL, VASCULAR ACCESS PORT LEFT;  Surgeon: Rajan More MD;  Location: [facility] OR    REPAIR TENDON ELBOW Right 10/02/2019    Procedure: Right Elbow Flexor Lengthening, Flexor Pronator Slide Of Wrist and Finger, Thumb Adductor Lengthening;  Surgeon: Anai Franco MD;  Location: [facility] OR    TONSILLECTOMY Bilateral 10/02/2019    Procedure: Bilateral Tonsillectomy;  Surgeon: Farhana Guy MD;  Location: [facility] OR    ZZC BREAST REDUCTION (INCLUDES LIPO) TIER 3 Bilateral 04/23/2019     albuterol (PROVENTIL) (2.5 MG/3ML) 0.083% neb solution  aspirin (ASA) 81 MG chewable tablet  budesonide-formoterol (SYMBICORT/BREYNA) 160-4.5 MCG/ACT Inhaler  deferasirox (JADENU) 360 MG tablet  Deferiprone, Twice Daily, 1000 MG TABS  diclofenac (VOLTAREN) 1 % topical gel  diphenhydrAMINE (BENADRYL) 50 MG capsule  EPINEPHrine (ANY BX GENERIC EQUIV) 0.3 MG/0.3ML injection 2-pack  gabapentin (NEURONTIN) 300 MG capsule  hydroxyurea (HYDREA) 500 MG capsule  ibuprofen (ADVIL/MOTRIN) 800 MG tablet  methocarbamol (ROBAXIN) 750 MG tablet  methylPREDNISolone (MEDROL) 32 MG tablet  naloxone (NARCAN) 4 MG/0.1ML nasal spray  naproxen (NAPROSYN) 500 MG tablet  oxyCODONE (ROXICODONE) 5 MG tablet  oxyCODONE IR (ROXICODONE) 10 MG tablet      Allergies   Allergen Reactions    Contrast Dye Angioedema     Hives and breathing issues    Fish-Derived Products Hives    Seafood Hives    Adhesive Tape Hives     Primipore dressing causes hives    Gadolinium     Iodinated Contrast Media      Family History  Family History   Problem Relation Age of Onset    Sickle Cell Trait Mother     Hypertension Mother     Asthma Mother     Sickle Cell Trait  Father     Glaucoma No family hx of     Macular Degeneration No family hx of     Diabetes No family hx of     Gout No family hx of      Social History   Social History     Tobacco Use    Smoking status: Never     Passive exposure: Never    Smokeless tobacco: Never   Substance Use Topics    Alcohol use: Not Currently     Alcohol/week: 0.0 standard drinks of alcohol    Drug use: Never      A medically appropriate review of systems was performed with pertinent positives and negatives noted in the HPI, and all other systems negative.    Physical Exam   BP: 119/76  Pulse: 87  Temp: 98.2  F (36.8  C)  Resp: 18  SpO2: 95 %  Physical Exam  Constitutional:       General: She is not in acute distress.     Appearance: Normal appearance. She is not diaphoretic.   HENT:      Head: Atraumatic.      Mouth/Throat:      Mouth: Mucous membranes are moist.   Eyes:      Conjunctiva/sclera: Conjunctivae normal.   Cardiovascular:      Rate and Rhythm: Normal rate.   Pulmonary:      Effort: No respiratory distress.   Abdominal:      Palpations: Abdomen is soft.      Tenderness: There is no abdominal tenderness.   Musculoskeletal:      Cervical back: Neck supple.   Skin:     General: Skin is warm.   Neurological:      Mental Status: She is alert.           ED Course, Procedures, & Data      Procedures                Results for orders placed or performed during the hospital encounter of 03/21/25   Lipase     Status: Normal   Result Value Ref Range    Lipase 23 13 - 60 U/L   Lactic acid whole blood with 1x repeat in 2 hr when >2     Status: Abnormal   Result Value Ref Range    Lactic Acid, Initial 0.6 (L) 0.7 - 2.0 mmol/L   Comprehensive metabolic panel     Status: Abnormal   Result Value Ref Range    Sodium 139 135 - 145 mmol/L    Potassium 3.9 3.4 - 5.3 mmol/L    Carbon Dioxide (CO2) 22 22 - 29 mmol/L    Anion Gap 11 7 - 15 mmol/L    Urea Nitrogen 6.7 6.0 - 20.0 mg/dL    Creatinine 0.54 0.51 - 0.95 mg/dL    GFR Estimate >90 >60  mL/min/1.73m2    Calcium 8.9 8.8 - 10.4 mg/dL    Chloride 106 98 - 107 mmol/L    Glucose 83 70 - 99 mg/dL    Alkaline Phosphatase 63 40 - 150 U/L    AST 37 0 - 45 U/L    ALT 21 0 - 50 U/L    Protein Total 7.9 6.4 - 8.3 g/dL    Albumin 4.7 3.5 - 5.2 g/dL    Bilirubin Total 2.9 (H) <=1.2 mg/dL   HCG qualitative pregnancy (blood)     Status: Normal   Result Value Ref Range    hCG Serum Qualitative Negative Negative   CBC with platelets and differential     Status: Abnormal   Result Value Ref Range    WBC Count 10.1 4.0 - 11.0 10e3/uL    RBC Count 2.32 (L) 3.80 - 5.20 10e6/uL    Hemoglobin 7.1 (L) 11.7 - 15.7 g/dL    Hematocrit 20.6 (L) 35.0 - 47.0 %    MCV 89 78 - 100 fL    MCH 30.6 26.5 - 33.0 pg    MCHC 34.5 31.5 - 36.5 g/dL    RDW 23.4 (H) 10.0 - 15.0 %    Platelet Count 482 (H) 150 - 450 10e3/uL    % Neutrophils 70 %    % Lymphocytes 16 %    % Monocytes 9 %    % Eosinophils 4 %    % Basophils 2 %    % Immature Granulocytes 0 %    NRBCs per 100 WBC 3 (H) <1 /100    Absolute Neutrophils 7.1 1.6 - 8.3 10e3/uL    Absolute Lymphocytes 1.6 0.8 - 5.3 10e3/uL    Absolute Monocytes 0.9 0.0 - 1.3 10e3/uL    Absolute Eosinophils 0.4 0.0 - 0.7 10e3/uL    Absolute Basophils 0.2 0.0 - 0.2 10e3/uL    Absolute Immature Granulocytes 0.0 <=0.4 10e3/uL    Absolute NRBCs 0.3 10e3/uL   CBC with platelets differential     Status: Abnormal    Narrative    The following orders were created for panel order CBC with platelets differential.  Procedure                               Abnormality         Status                     ---------                               -----------         ------                     CBC with platelets and ...[[id number]]  Abnormal            Final result                 Please view results for these tests on the individual orders.     Medications   HYDROmorphone (DILAUDID) injection 1 mg (1 mg Intravenous $Given 3/21/25 7033)   ketorolac (TORADOL) injection 30 mg (30 mg Intravenous $Given 3/21/25 4446)    lactated ringers BOLUS 1,000 mL (0 mLs Intravenous Stopped 3/21/25 1729)   HYDROmorphone (DILAUDID) injection 1 mg (1 mg Intravenous $Given 3/21/25 1549)   ondansetron (ZOFRAN) injection 4 mg (4 mg Intravenous $Given 3/21/25 1724)   HYDROmorphone (DILAUDID) injection 1 mg (1 mg Intravenous $Given 3/21/25 1724)     Labs Ordered and Resulted from Time of ED Arrival to Time of ED Departure   LACTIC ACID WHOLE BLOOD WITH 1X REPEAT IN 2 HR WHEN >2 - Abnormal       Result Value    Lactic Acid, Initial 0.6 (*)    COMPREHENSIVE METABOLIC PANEL - Abnormal    Sodium 139      Potassium 3.9      Carbon Dioxide (CO2) 22      Anion Gap 11      Urea Nitrogen 6.7      Creatinine 0.54      GFR Estimate >90      Calcium 8.9      Chloride 106      Glucose 83      Alkaline Phosphatase 63      AST 37      ALT 21      Protein Total 7.9      Albumin 4.7      Bilirubin Total 2.9 (*)    CBC WITH PLATELETS AND DIFFERENTIAL - Abnormal    WBC Count 10.1      RBC Count 2.32 (*)     Hemoglobin 7.1 (*)     Hematocrit 20.6 (*)     MCV 89      MCH 30.6      MCHC 34.5      RDW 23.4 (*)     Platelet Count 482 (*)     % Neutrophils 70      % Lymphocytes 16      % Monocytes 9      % Eosinophils 4      % Basophils 2      % Immature Granulocytes 0      NRBCs per 100 WBC 3 (*)     Absolute Neutrophils 7.1      Absolute Lymphocytes 1.6      Absolute Monocytes 0.9      Absolute Eosinophils 0.4      Absolute Basophils 0.2      Absolute Immature Granulocytes 0.0      Absolute NRBCs 0.3     LIPASE - Normal    Lipase 23     HCG QUALITATIVE PREGNANCY - Normal    hCG Serum Qualitative Negative     ROUTINE UA WITH MICROSCOPIC REFLEX TO CULTURE     No orders to display          Critical care was not performed.     Medical Decision Making  The patient's presentation was of high complexity (a chronic illness severe exacerbation, progression, or side effect of treatment).    The patient's evaluation involved:  review of external note(s) from 3+ sources (see separate  area of note for details)  review of 3+ test result(s) ordered prior to this encounter (see separate area of note for details)  strong consideration of a test (see separate area of note for details) that was ultimately deferred  ordering and/or review of 3+ test(s) in this encounter (see separate area of note for details)    The patient's management necessitated high risk (a parenteral controlled substance).    Assessment & Plan    25f PMHx sickle cell disease p/w abdominal pain consistent with prior sickle crisis pain.  In ED patient is HDS/AF, NAD.  Abdomen is benign.  DDx sickle crisis pain versus less likely mesenteric/other bowel ischemia, appendicitis, diverticulitis, UTI, ectopic, other acute surgical abdomen versus other    Labs today with CBC without leukocytosis, shows baseline anemia.  Lipase and pregnancy negative.  CMP with baseline elevated T. bili, but otherwise WNL.  Lactate negative.  Patient received Dilaudid, ketorolac, Zofran, LR as per her pain plan, after which her pain had completely resolved.  She was able to pass p.o. challenge.  Serial abdominal exams benign.  As patient has had similar previous pain I/s/o of her SCD, lactate is negative and CBC is without leukocytosis, suspicion for intra-abdominal ischemia or acute surgical abdomen is low.  Feel patient is safe for discharge at this time with strict ER return precautions for worsening symptoms.    I have reviewed the nursing notes. I have reviewed the findings, diagnosis, plan and need for follow up with the patient.    New Prescriptions    No medications on file       Final diagnoses:   Sickle cell disease with crisis (H)         Danyel Blackburn PA-C  [facility] EMERGENCY DEPARTMENT  3/21/2025     Danyel Blackburn PA-C  03/21/25 181

## 2025-03-21 NOTE — ED TRIAGE NOTES
Triage Assessment (Adult)       Row Name 03/21/25 9579          Triage Assessment    Airway WDL WDL        Respiratory WDL    Respiratory WDL WDL        Skin Circulation/Temperature WDL    Skin Circulation/Temperature WDL WDL        Cardiac WDL    Cardiac WDL WDL        Peripheral/Neurovascular WDL    Peripheral Neurovascular WDL WDL        Cognitive/Neuro/Behavioral WDL    Cognitive/Neuro/Behavioral WDL WDL

## 2025-03-24 ENCOUNTER — INFUSION THERAPY VISIT (OUTPATIENT)
Dept: TRANSPLANT | Facility: CLINIC | Age: 25
End: 2025-03-24
Attending: PEDIATRICS
Payer: COMMERCIAL

## 2025-03-24 ENCOUNTER — NURSE TRIAGE (OUTPATIENT)
Dept: ONCOLOGY | Facility: CLINIC | Age: 25
End: 2025-03-24
Payer: COMMERCIAL

## 2025-03-24 ENCOUNTER — PATIENT OUTREACH (OUTPATIENT)
Dept: CARE COORDINATION | Facility: CLINIC | Age: 25
End: 2025-03-24
Payer: COMMERCIAL

## 2025-03-24 VITALS
TEMPERATURE: 97.6 F | OXYGEN SATURATION: 94 % | HEART RATE: 72 BPM | RESPIRATION RATE: 16 BRPM | DIASTOLIC BLOOD PRESSURE: 67 MMHG | SYSTOLIC BLOOD PRESSURE: 117 MMHG

## 2025-03-24 DIAGNOSIS — D57.00 SICKLE CELL PAIN CRISIS (H): Primary | ICD-10-CM

## 2025-03-24 DIAGNOSIS — D57.00 SICKLE CELL DISEASE WITH CRISIS (H): ICD-10-CM

## 2025-03-24 DIAGNOSIS — G81.10 SPASTIC HEMIPLEGIA, UNSPECIFIED ETIOLOGY, UNSPECIFIED LATERALITY (H): ICD-10-CM

## 2025-03-24 PROCEDURE — 250N000011 HC RX IP 250 OP 636: Performed by: PEDIATRICS

## 2025-03-24 PROCEDURE — 96376 TX/PRO/DX INJ SAME DRUG ADON: CPT

## 2025-03-24 PROCEDURE — 258N000003 HC RX IP 258 OP 636: Performed by: PEDIATRICS

## 2025-03-24 PROCEDURE — 96374 THER/PROPH/DIAG INJ IV PUSH: CPT

## 2025-03-24 PROCEDURE — 250N000013 HC RX MED GY IP 250 OP 250 PS 637: Performed by: PEDIATRICS

## 2025-03-24 PROCEDURE — 96361 HYDRATE IV INFUSION ADD-ON: CPT

## 2025-03-24 PROCEDURE — 96375 TX/PRO/DX INJ NEW DRUG ADDON: CPT

## 2025-03-24 RX ORDER — KETOROLAC TROMETHAMINE 30 MG/ML
30 INJECTION, SOLUTION INTRAMUSCULAR; INTRAVENOUS ONCE
Start: 2025-04-01 | End: 2025-04-01

## 2025-03-24 RX ORDER — ONDANSETRON 2 MG/ML
8 INJECTION INTRAMUSCULAR; INTRAVENOUS EVERY 6 HOURS PRN
Status: DISCONTINUED | OUTPATIENT
Start: 2025-03-24 | End: 2025-03-24 | Stop reason: HOSPADM

## 2025-03-24 RX ORDER — OXYCODONE HYDROCHLORIDE 10 MG/1
10 TABLET ORAL EVERY 6 HOURS PRN
Qty: 12 TABLET | Refills: 0 | Status: SHIPPED | OUTPATIENT
Start: 2025-03-27

## 2025-03-24 RX ORDER — DIPHENHYDRAMINE HCL 25 MG
50 CAPSULE ORAL
Start: 2025-04-01

## 2025-03-24 RX ORDER — DIPHENHYDRAMINE HCL 25 MG
50 CAPSULE ORAL
Status: COMPLETED | OUTPATIENT
Start: 2025-03-24 | End: 2025-03-24

## 2025-03-24 RX ORDER — ONDANSETRON 4 MG/1
8 TABLET, FILM COATED ORAL
Start: 2025-04-01

## 2025-03-24 RX ORDER — KETOROLAC TROMETHAMINE 30 MG/ML
30 INJECTION, SOLUTION INTRAMUSCULAR; INTRAVENOUS ONCE
Status: COMPLETED | OUTPATIENT
Start: 2025-03-24 | End: 2025-03-24

## 2025-03-24 RX ORDER — HEPARIN SODIUM (PORCINE) LOCK FLUSH IV SOLN 100 UNIT/ML 100 UNIT/ML
5 SOLUTION INTRAVENOUS
OUTPATIENT
Start: 2025-04-01

## 2025-03-24 RX ORDER — OXYCODONE HYDROCHLORIDE 5 MG/1
5 TABLET ORAL EVERY 6 HOURS PRN
Qty: 12 TABLET | Refills: 0 | Status: SHIPPED | OUTPATIENT
Start: 2025-03-24 | End: 2025-03-24

## 2025-03-24 RX ORDER — HEPARIN SODIUM,PORCINE 10 UNIT/ML
5 VIAL (ML) INTRAVENOUS
OUTPATIENT
Start: 2025-04-01

## 2025-03-24 RX ORDER — ONDANSETRON 2 MG/ML
8 INJECTION INTRAMUSCULAR; INTRAVENOUS EVERY 6 HOURS PRN
Start: 2025-04-01

## 2025-03-24 RX ADMIN — DIPHENHYDRAMINE HYDROCHLORIDE 50 MG: 25 CAPSULE ORAL at 12:30

## 2025-03-24 RX ADMIN — SODIUM CHLORIDE, POTASSIUM CHLORIDE, SODIUM LACTATE AND CALCIUM CHLORIDE 1000 ML: 600; 310; 30; 20 INJECTION, SOLUTION INTRAVENOUS at 12:16

## 2025-03-24 RX ADMIN — HYDROMORPHONE HYDROCHLORIDE 1 MG: 1 INJECTION, SOLUTION INTRAMUSCULAR; INTRAVENOUS; SUBCUTANEOUS at 13:13

## 2025-03-24 RX ADMIN — ONDANSETRON 8 MG: 2 INJECTION INTRAMUSCULAR; INTRAVENOUS at 12:24

## 2025-03-24 RX ADMIN — KETOROLAC TROMETHAMINE 30 MG: 30 INJECTION, SOLUTION INTRAMUSCULAR; INTRAVENOUS at 12:22

## 2025-03-24 RX ADMIN — HYDROMORPHONE HYDROCHLORIDE 1 MG: 1 INJECTION, SOLUTION INTRAMUSCULAR; INTRAVENOUS; SUBCUTANEOUS at 14:21

## 2025-03-24 RX ADMIN — HYDROMORPHONE HYDROCHLORIDE 1 MG: 1 INJECTION, SOLUTION INTRAMUSCULAR; INTRAVENOUS; SUBCUTANEOUS at 12:19

## 2025-03-24 NOTE — TELEPHONE ENCOUNTER
Oncology Nurse Triage - Sickle Cell Pain Crisis:    Situation: Jennifer  calling about Sickle Cell Pain Crisis, requesting to be added on for IV fluids and pain medicine    Background:     Patient's last infusion was 3/21/2025   Last clinic visit date:3/17/2025 Dr. Duncan  Does patient have active treatment plan? Yes    Assessment of Symptoms:  Onset/Duration of symptoms: 1 day    Is it typical sickle cell pain? Yes  Location: back  Character: Dull ache  Intensity: 8/10    Any radiation of pain, numbness, tingling, weakness, warmth, swelling, discoloration of arms or legs?No    Fever? No    Chest Pain Present: No    Shortness of breath: No    Other home therapies tried: HEAT/HEATING PAD and WARM BATH     Last home medication taken and when: 10:30pm oxycodone    Any Refills Needed?: Yes, Oyxocodne 10mg  [address] [facility]    Does patient have transportation & length of time to get to clinic: Yes  20minutes    Recommendations:     Meets protocol for IVF/Pain    0833 Offer for BMTclinic at 12:00pm for IVF/Pain, pt in agreement with 12:00pm appt,  states since later in day would need assist with transportation coordinated by .     0835 message sent to  to assist with transportation.     0838 Scheduling request sent to add on appt.       Please note, if you are late for your appt, you risk losing your infusion appt as it may delay another patient's infusion who arrived on time.

## 2025-03-24 NOTE — TELEPHONE ENCOUNTER
Narcotic Refill Request    Medication(s) requested:  oxycodone 5mg  Person Requesting Refill:Jennifer (pt)   What pain is the medication treating: sickle cell crisis pain  How is the medication being taken?:as needed  Does pt have enough for today? no  Is pain being adequately controlled on the current regimen?: ok, requires IVF/Pain infusion 2-3xweekly  Experiencing any side effects from medication?: denies    Date of most recent appointment:  3/17/2025 Dr. duncan  Any No Show Visits:none recently  Next appointment:   pending provider appt, to be scheduled  Last fill date and by whom:  Patricia Mantilla CNP on 3/5/2025   Reviewed: yes      Send to provider: Dr. Duncan/RNCC Arely pulliam

## 2025-03-24 NOTE — PROGRESS NOTES
Ambulatory Care Management- Transportation Support  [facility]     Data/Intervention:  Patient Name:    Jennifer Cervantes     /Age: 2000 (25 year old)     C[facility] team member assisting Specialty SW with transportation request.      Assessment:  CHW/CTA called [employer] to arrange medical appointment transportation in conjunction with patient's insurance.  time 11:00am    Taxi company: [employer]    Patient will need to call above taxi company at phone number: [phone] when ready for return ride home.      Plan:  Patient is aware of the transportation plan.  available to assist with any other needs.      Maritza Vick MA

## 2025-03-24 NOTE — PROGRESS NOTES
Infusion Nursing Note:  Jennifer Cervantes presents today for IVF and pain meds for Hx of sickle cell.    Patient seen by provider today: No   present during visit today: Not Applicable.    Note: Pt today with pain 8/10 but generally feeling her baseline and vitals were WDL. Port was accessed. Pt was given 1L of lactated Ringers, dilaudid, benadryl, Zofran, and Toradol. Port was deaccessed and pt discharged home.       Intravenous Access:  Implanted Port.    Treatment Conditions:  Not Applicable.      Post Infusion Assessment:  Patient tolerated infusion without incident.       Discharge Plan:   Patient discharged in stable condition accompanied by: self.      Nicolasa Loyola RN

## 2025-03-27 ENCOUNTER — VIRTUAL VISIT (OUTPATIENT)
Dept: PSYCHOLOGY | Facility: CLINIC | Age: 25
End: 2025-03-27
Payer: COMMERCIAL

## 2025-03-27 DIAGNOSIS — F54 PSYCHOLOGICAL AND BEHAVIORAL FACTORS ASSOCIATED WITH DISORDERS OR DISEASES CLASSIFIED ELSEWHERE: ICD-10-CM

## 2025-03-27 DIAGNOSIS — F34.1 PERSISTENT DEPRESSIVE DISORDER: Primary | ICD-10-CM

## 2025-03-27 DIAGNOSIS — G89.4 CHRONIC PAIN ASSOCIATED WITH SIGNIFICANT PSYCHOSOCIAL DYSFUNCTION: ICD-10-CM

## 2025-03-27 DIAGNOSIS — F41.9 ANXIETY: ICD-10-CM

## 2025-03-27 DIAGNOSIS — D57.1 HB-SS DISEASE WITHOUT CRISIS (H): ICD-10-CM

## 2025-03-27 PROCEDURE — 90837 PSYTX W PT 60 MINUTES: CPT | Mod: 95 | Performed by: PSYCHOLOGIST

## 2025-03-28 ENCOUNTER — APPOINTMENT (OUTPATIENT)
Dept: LAB | Facility: CLINIC | Age: 25
End: 2025-03-28
Attending: PEDIATRICS
Payer: COMMERCIAL

## 2025-03-30 RX ORDER — HEPARIN SODIUM (PORCINE) LOCK FLUSH IV SOLN 100 UNIT/ML 100 UNIT/ML
5 SOLUTION INTRAVENOUS
OUTPATIENT
Start: 2025-03-30

## 2025-03-30 RX ORDER — HEPARIN SODIUM,PORCINE 10 UNIT/ML
5 VIAL (ML) INTRAVENOUS
OUTPATIENT
Start: 2025-03-30

## 2025-03-30 NOTE — CONFIDENTIAL NOTE
"Health Psychology - Follow up Visit  Confidential Summary*     The author of this note documented a reason for not sharing it with the patient.     REFERRAL SOURCE:  PCP     CHIEF COMPLAINT/REASON FOR VISIT  Cognitive behavioral therapy and behavioral counseling in context of chronic pain associated with sickle cell disease with recurrent hospitalizations and asthma and emotion dysregulation with current depressed mood.       Patient seen for 60 minute psychotherapy session.  Patient was at her home and provider was at her home.  Session provided via mYwindow.      Patient has agreed to receiving telehealth services.      The patient has been notified of following:   \"This video visit was conducted via video call between you and your physician/provider. We have found that certain health care needs can be provided without the need for an in-person physical exam. Video visits may be billed at different rates depending on your insurance coverage.  Please reach out to your insurance provider with any questions. If during the course of the call the physician/provider feels a video visit is not appropriate, you will not be charged for this service.\"     Patient has given verbal consent for telephone/Video visit? Yes       Subjective:  Patient seen for first session.  Conducted a modified assessment, focusing on goals of patient and current depressive  and anxiety symptoms and establishing whether this provider might be a good fit for patient.  She described a history of Sickle Cell disease since stroke at the age of 1.  She reported that she has been challenged with transition from pediatric to adult care team and that she struggles with perception that care team does not understand her tolerance of pain medication and that there may be underlying racial discrimination surrounding perception of overuse.      She described a long history of emotion dysregulation and reported that she cries daily.  Discussed other symptoms of " "depression and she endorsed sleep, appetite, energy and concentration challenges.  Patient lives with her family and she does not work at present. She reported that she is hospitalized several times per year and acknowledges that stress may underlie her sickle cell crises.  Objective:  Patient was on time for today s session, appropriately groomed and dressed, and demonstrated good eye contact.  She appeared friendly, alert and oriented. Mood was euthymic, with appropriate range of affect. Patient denied suicidal or assaultive ideation, plan, or intent.        Assessment:  The patient is coping with sickle cell disease and perception of racism.  She also described challenges with regulating her emotions and having recurrent interpersonal discord.  She also described chronic depressive symptoms and that she \"cries daily\".       Plan:  See in 2 weeks, encouraged engaging in weekly therapy.       Time In:  3:15  Time Out: 4:15     Diagnosis:  Axis I Persistent depressive disorder vs recurrent MDD, current episode moderate; chronic pain; psychological factors associated with medical condition   Axis II Deferred   Axis III please see medical records for details   [place] IV Psychosocial and Environmental Stressors:Health and racial disparities         TREATMENT PLAN:  next session     Shandra Caruso PhD, LP        *In accordance with the Rules of the [place] Board of [employer], it is noted that psychological descriptions and scientific procedures underlying psychological evaluations have limitations.  Absolute predictions cannot be made based on information in this report.             "

## 2025-03-31 ENCOUNTER — INFUSION THERAPY VISIT (OUTPATIENT)
Dept: ONCOLOGY | Facility: CLINIC | Age: 25
End: 2025-03-31
Attending: PEDIATRICS
Payer: COMMERCIAL

## 2025-03-31 VITALS
SYSTOLIC BLOOD PRESSURE: 106 MMHG | DIASTOLIC BLOOD PRESSURE: 68 MMHG | HEART RATE: 67 BPM | OXYGEN SATURATION: 91 % | RESPIRATION RATE: 14 BRPM | TEMPERATURE: 97.7 F

## 2025-03-31 DIAGNOSIS — Z86.73 HISTORY OF STROKE: ICD-10-CM

## 2025-03-31 DIAGNOSIS — D57.1 HB-SS DISEASE WITHOUT CRISIS (H): Primary | ICD-10-CM

## 2025-03-31 DIAGNOSIS — D57.00 SICKLE CELL PAIN CRISIS (H): ICD-10-CM

## 2025-03-31 DIAGNOSIS — G81.10 SPASTIC HEMIPLEGIA, UNSPECIFIED ETIOLOGY, UNSPECIFIED LATERALITY (H): ICD-10-CM

## 2025-03-31 PROCEDURE — 96376 TX/PRO/DX INJ SAME DRUG ADON: CPT

## 2025-03-31 PROCEDURE — 250N000011 HC RX IP 250 OP 636: Mod: JZ | Performed by: PEDIATRICS

## 2025-03-31 PROCEDURE — 96361 HYDRATE IV INFUSION ADD-ON: CPT

## 2025-03-31 PROCEDURE — 96374 THER/PROPH/DIAG INJ IV PUSH: CPT

## 2025-03-31 PROCEDURE — 96375 TX/PRO/DX INJ NEW DRUG ADDON: CPT

## 2025-03-31 PROCEDURE — 258N000003 HC RX IP 258 OP 636: Performed by: PEDIATRICS

## 2025-03-31 PROCEDURE — 36430 TRANSFUSION BLD/BLD COMPNT: CPT

## 2025-03-31 PROCEDURE — 250N000013 HC RX MED GY IP 250 OP 250 PS 637: Performed by: PEDIATRICS

## 2025-03-31 PROCEDURE — P9016 RBC LEUKOCYTES REDUCED: HCPCS | Performed by: PEDIATRICS

## 2025-03-31 RX ORDER — HEPARIN SODIUM,PORCINE 10 UNIT/ML
5 VIAL (ML) INTRAVENOUS
Status: CANCELLED | OUTPATIENT
Start: 2025-04-01

## 2025-03-31 RX ORDER — ONDANSETRON 4 MG/1
8 TABLET, FILM COATED ORAL
Status: CANCELLED
Start: 2025-04-01

## 2025-03-31 RX ORDER — DIPHENHYDRAMINE HCL 25 MG
50 CAPSULE ORAL
Status: COMPLETED | OUTPATIENT
Start: 2025-03-31 | End: 2025-03-31

## 2025-03-31 RX ORDER — HEPARIN SODIUM (PORCINE) LOCK FLUSH IV SOLN 100 UNIT/ML 100 UNIT/ML
5 SOLUTION INTRAVENOUS
Status: CANCELLED | OUTPATIENT
Start: 2025-04-01

## 2025-03-31 RX ORDER — KETOROLAC TROMETHAMINE 30 MG/ML
30 INJECTION, SOLUTION INTRAMUSCULAR; INTRAVENOUS ONCE
Status: COMPLETED | OUTPATIENT
Start: 2025-03-31 | End: 2025-03-31

## 2025-03-31 RX ORDER — DIPHENHYDRAMINE HCL 25 MG
50 CAPSULE ORAL
Status: CANCELLED
Start: 2025-04-01

## 2025-03-31 RX ORDER — ONDANSETRON 2 MG/ML
8 INJECTION INTRAMUSCULAR; INTRAVENOUS EVERY 6 HOURS PRN
Status: CANCELLED
Start: 2025-04-01

## 2025-03-31 RX ORDER — ONDANSETRON 2 MG/ML
8 INJECTION INTRAMUSCULAR; INTRAVENOUS EVERY 6 HOURS PRN
Status: DISCONTINUED | OUTPATIENT
Start: 2025-03-31 | End: 2025-03-31 | Stop reason: HOSPADM

## 2025-03-31 RX ORDER — HEPARIN SODIUM (PORCINE) LOCK FLUSH IV SOLN 100 UNIT/ML 100 UNIT/ML
5 SOLUTION INTRAVENOUS
Status: DISCONTINUED | OUTPATIENT
Start: 2025-03-31 | End: 2025-03-31 | Stop reason: HOSPADM

## 2025-03-31 RX ORDER — KETOROLAC TROMETHAMINE 30 MG/ML
30 INJECTION, SOLUTION INTRAMUSCULAR; INTRAVENOUS ONCE
Status: CANCELLED
Start: 2025-04-01 | End: 2025-04-01

## 2025-03-31 RX ADMIN — ONDANSETRON 8 MG: 2 INJECTION INTRAMUSCULAR; INTRAVENOUS at 08:24

## 2025-03-31 RX ADMIN — HEPARIN 5 ML: 100 SYRINGE at 11:58

## 2025-03-31 RX ADMIN — KETOROLAC TROMETHAMINE 30 MG: 30 INJECTION, SOLUTION INTRAMUSCULAR; INTRAVENOUS at 08:24

## 2025-03-31 RX ADMIN — SODIUM CHLORIDE, POTASSIUM CHLORIDE, SODIUM LACTATE AND CALCIUM CHLORIDE 1000 ML: 600; 310; 30; 20 INJECTION, SOLUTION INTRAVENOUS at 09:52

## 2025-03-31 RX ADMIN — DIPHENHYDRAMINE HYDROCHLORIDE 50 MG: 25 CAPSULE ORAL at 08:23

## 2025-03-31 RX ADMIN — HYDROMORPHONE HYDROCHLORIDE 1 MG: 1 INJECTION, SOLUTION INTRAMUSCULAR; INTRAVENOUS; SUBCUTANEOUS at 10:37

## 2025-03-31 RX ADMIN — HYDROMORPHONE HYDROCHLORIDE 1 MG: 1 INJECTION, SOLUTION INTRAMUSCULAR; INTRAVENOUS; SUBCUTANEOUS at 08:24

## 2025-03-31 RX ADMIN — HYDROMORPHONE HYDROCHLORIDE 1 MG: 1 INJECTION, SOLUTION INTRAMUSCULAR; INTRAVENOUS; SUBCUTANEOUS at 09:28

## 2025-03-31 NOTE — PROGRESS NOTES
"Infusion Nursing Note:  Jennifer Cervantes presents today for 1 unit PRBCs, IV fluids and pain medication.    Patient seen by provider today: No   present during visit today: Not Applicable.    Note: Pt presents today with 8/10 R arm and \"chest wall\" .    TORB Dr. Thibodeaux/Emily Meese, RN 03/31/25 0821  - Okay to proceed with IV fluids and pain medication    Intravenous Access:  Peripheral IV placed.    Treatment Conditions:  Not Applicable.    Post Infusion Assessment:  Patient tolerated infusion without incident.  Blood return noted pre and post infusion.  Site patent and intact, free from redness, edema or discomfort.  No evidence of extravasations.  Access discontinued per protocol.     Discharge Plan:   Patient declined prescription refills.  Discharge instructions reviewed with: Patient.  Patient and/or family verbalized understanding of discharge instructions and all questions answered.  AVS to patient via PeekT.  Patient will return 04/25/25 for next appointment.   Patient discharged in stable condition accompanied by: self.  Departure Mode: Ambulatory.      Emily Meese, RN  "

## 2025-03-31 NOTE — PATIENT INSTRUCTIONS
[facility] Triage and after hours / weekends / holidays:  [phone]    Please call the triage or after hours line if you experience a temperature greater than or equal to 100.4, shaking chills, have uncontrolled nausea, vomiting and/or diarrhea, dizziness, shortness of breath, chest pain, bleeding, unexplained bruising, or if you have any other new/concerning symptoms, questions or concerns.      If you are having any concerning symptoms or wish to speak to a provider before your next infusion visit, please call triage to notify them so we can adequately serve you.     If you need a refill on a narcotic prescription or other medication, please call before your infusion appointment.                March 2025 Sunday Monday Tuesday Wednesday Thursday Friday Saturday                                 1    Admission   7:37 PM   Jitendra Perry MD   [facility]   (Discharge: 3/1/2025)    CT CHEST WO   8:20 PM   (20 min.)   URCT1   [facility]   2     3    ONC INFUSION 3 HR (180 MIN)  11:00 AM   (180 min.)   UC ONC INFUSION NURSE   [facility] 4  Happy Birthday!     5    VIDEO VISIT RETURN   2:15 PM   (30 min.)   Suraj Case MD   [facility] Medicine [place] 6    SPEC INFUSION 3 HR (180 MIN)  11:00 AM   (180 min.)   UC SIPC INFUSION NURSE   [facility] 7     8    Admission   4:17 AM   Cornelia Malloy MD   [facility]   (Discharge: 3/8/2025)    XR GENERAL XRAY   6:10 AM   (20 min.)   URXR3   [facility]   9     10    SPEC INFUSION 3 HR (180 MIN)   8:30 AM   (180 min.)   UC SIPC INFUSION NURSE   [facility] 11     12    NEW KNEE   9:25 AM   (20 min.)   Alexy Navarrete MD   [facility] 13    BMT INFUSION 3 HR (180  MIN)   9:00 AM   (180 min.)   UC BMT INFUSION NURSE   [facility] [place] 14     15    Admission   6:19 AM   Jesica Michael DO   [facility] Emergency Department   (Discharge: 3/15/2025)   16     17    LAB CENTRAL   9:00 AM   (15 min.)    [facility] DRAW   [facility]    SPEC INFUSION 2 HR (120 MIN)   9:30 AM   (120 min.)   UC SIPC INFUSION NURSE   [facility]    RETURN CCSL   1:45 PM   (30 min.)   Eric Duncan MD   [facility] 18     19    EXTREMITY TREATMENT    9:00 AM   (40 min.)   Emma Garcia, PT   [facility] [place]    SPEC INFUSION 3 HR (180 MIN)  10:00 AM   (180 min.)   UC SIPC INFUSION NURSE   [facility] 20     21    Admission   1:40 PM   Danyel Blackburn PA-C   [facility]   (Discharge: 3/21/2025) 22       23     24    BMT INFUSION 3 HR (180 MIN)  12:00 PM   (180 min.)    BMT INFUSION NURSE   [facility] [place] 25     26     27    ADULT PSYCHOTHERAPY NEW   2:45 PM   (60 min.)   Shandra Caruso, PhD LP   [facility] 28    LAB CENTRAL  11:30 AM   (15 min.)    [facility] DRAW   [facility]    ONC INFUSION 4 HR (240 MIN)  12:00 PM   (240 min.)    ONC INFUSION NURSE   [facility] 29       30     31    ONC INFUSION 3 HR (180 MIN)   7:30 AM   (180 min.)    ONC INFUSION NURSE   [facility]                                      April 2025 Sunday Monday Tuesday Wednesday Thursday Friday Saturday             1     2    EXTREMITY TREATMENT    9:40 AM   (40 min.)   Emma Garcia, PT   [facility] 3     4     5       6     7     8     9     10     11     12       13     14     15     16    EXTREMITY TREATMENT    9:40 AM   (40 min.)   Emma Garcia, PT   [facility] 17     18     19       20     21     22     23     24     25    LAB CENTRAL  11:30 AM   (15 min.)    [facility] DRAW   [facility]    ONC INFUSION 4 HR (240 MIN)  12:00 PM   (240 min.)    ONC INFUSION NURSE   [facility] 26       27     28     29     30    EXTREMITY TREATMENT    9:40 AM   (40 min.)   Emma Garcia, PT   [facility]                                Lab Results:  No results found for this or any previous visit (from the past 12 hours).

## 2025-04-01 ENCOUNTER — TELEPHONE (OUTPATIENT)
Dept: ONCOLOGY | Facility: CLINIC | Age: 25
End: 2025-04-01
Payer: MEDICAID

## 2025-04-01 NOTE — TELEPHONE ENCOUNTER
Prior Authorization Approval    Medication: DEFERIPRONE 1000 MG PO TABS  Authorization Effective Date: 4/1/2025  Authorization Expiration Date: 4/1/2026  Approved Dose/Quantity: 2000mg twice daily. 120 tabs per 30 days  Reference #:     Insurance Company: [place] Medicaid ([facility]) - Phone [phone] Fax [phone]  Expected CoPay: $ 0  CoPay Card Available: No    Financial Assistance Needed: No  Which Pharmacy is filling the prescription: [facility] - [place], [place] - [address]  Pharmacy Notified: Yes  Patient Notified: Yes

## 2025-04-02 ENCOUNTER — PATIENT OUTREACH (OUTPATIENT)
Dept: CARE COORDINATION | Facility: CLINIC | Age: 25
End: 2025-04-02

## 2025-04-02 ENCOUNTER — INFUSION THERAPY VISIT (OUTPATIENT)
Dept: INFUSION THERAPY | Facility: CLINIC | Age: 25
End: 2025-04-02
Attending: PEDIATRICS
Payer: MEDICAID

## 2025-04-02 ENCOUNTER — NURSE TRIAGE (OUTPATIENT)
Dept: ONCOLOGY | Facility: CLINIC | Age: 25
End: 2025-04-02

## 2025-04-02 VITALS — HEART RATE: 83 BPM | SYSTOLIC BLOOD PRESSURE: 126 MMHG | OXYGEN SATURATION: 93 % | DIASTOLIC BLOOD PRESSURE: 79 MMHG

## 2025-04-02 DIAGNOSIS — G81.10 SPASTIC HEMIPLEGIA, UNSPECIFIED ETIOLOGY, UNSPECIFIED LATERALITY (H): ICD-10-CM

## 2025-04-02 DIAGNOSIS — D57.00 SICKLE CELL DISEASE WITH CRISIS (H): ICD-10-CM

## 2025-04-02 DIAGNOSIS — D57.00 SICKLE CELL PAIN CRISIS (H): Primary | ICD-10-CM

## 2025-04-02 PROCEDURE — 96361 HYDRATE IV INFUSION ADD-ON: CPT

## 2025-04-02 PROCEDURE — 96374 THER/PROPH/DIAG INJ IV PUSH: CPT

## 2025-04-02 PROCEDURE — 258N000003 HC RX IP 258 OP 636: Performed by: PEDIATRICS

## 2025-04-02 PROCEDURE — 250N000013 HC RX MED GY IP 250 OP 250 PS 637: Performed by: PEDIATRICS

## 2025-04-02 PROCEDURE — 96376 TX/PRO/DX INJ SAME DRUG ADON: CPT

## 2025-04-02 PROCEDURE — 96375 TX/PRO/DX INJ NEW DRUG ADDON: CPT

## 2025-04-02 PROCEDURE — 250N000011 HC RX IP 250 OP 636: Mod: JZ | Performed by: PEDIATRICS

## 2025-04-02 RX ORDER — KETOROLAC TROMETHAMINE 30 MG/ML
30 INJECTION, SOLUTION INTRAMUSCULAR; INTRAVENOUS ONCE
Start: 2025-07-01 | End: 2025-07-01

## 2025-04-02 RX ORDER — ONDANSETRON 2 MG/ML
8 INJECTION INTRAMUSCULAR; INTRAVENOUS EVERY 6 HOURS PRN
Start: 2025-07-01

## 2025-04-02 RX ORDER — HEPARIN SODIUM,PORCINE 10 UNIT/ML
5 VIAL (ML) INTRAVENOUS
Status: DISCONTINUED | OUTPATIENT
Start: 2025-04-02 | End: 2025-04-02 | Stop reason: HOSPADM

## 2025-04-02 RX ORDER — KETOROLAC TROMETHAMINE 30 MG/ML
30 INJECTION, SOLUTION INTRAMUSCULAR; INTRAVENOUS ONCE
Status: COMPLETED | OUTPATIENT
Start: 2025-04-02 | End: 2025-04-02

## 2025-04-02 RX ORDER — DIPHENHYDRAMINE HCL 25 MG
50 CAPSULE ORAL
Start: 2025-07-01

## 2025-04-02 RX ORDER — HEPARIN SODIUM,PORCINE 10 UNIT/ML
5 VIAL (ML) INTRAVENOUS
OUTPATIENT
Start: 2025-07-01

## 2025-04-02 RX ORDER — HEPARIN SODIUM (PORCINE) LOCK FLUSH IV SOLN 100 UNIT/ML 100 UNIT/ML
5 SOLUTION INTRAVENOUS
Status: DISCONTINUED | OUTPATIENT
Start: 2025-04-02 | End: 2025-04-02 | Stop reason: HOSPADM

## 2025-04-02 RX ORDER — ONDANSETRON 4 MG/1
8 TABLET, ORALLY DISINTEGRATING ORAL
Status: DISCONTINUED | OUTPATIENT
Start: 2025-04-02 | End: 2025-04-02 | Stop reason: HOSPADM

## 2025-04-02 RX ORDER — OXYCODONE HYDROCHLORIDE 10 MG/1
10 TABLET ORAL EVERY 6 HOURS PRN
Qty: 12 TABLET | Refills: 0 | Status: SHIPPED | OUTPATIENT
Start: 2025-04-02

## 2025-04-02 RX ORDER — ONDANSETRON 8 MG/1
8 TABLET, FILM COATED ORAL
Start: 2025-07-01

## 2025-04-02 RX ORDER — DIPHENHYDRAMINE HCL 25 MG
50 CAPSULE ORAL
Status: COMPLETED | OUTPATIENT
Start: 2025-04-02 | End: 2025-04-02

## 2025-04-02 RX ORDER — ONDANSETRON 2 MG/ML
8 INJECTION INTRAMUSCULAR; INTRAVENOUS EVERY 6 HOURS PRN
Status: DISCONTINUED | OUTPATIENT
Start: 2025-04-02 | End: 2025-04-02 | Stop reason: HOSPADM

## 2025-04-02 RX ORDER — HEPARIN SODIUM (PORCINE) LOCK FLUSH IV SOLN 100 UNIT/ML 100 UNIT/ML
5 SOLUTION INTRAVENOUS
OUTPATIENT
Start: 2025-07-01

## 2025-04-02 RX ADMIN — KETOROLAC TROMETHAMINE 30 MG: 30 INJECTION, SOLUTION INTRAMUSCULAR at 08:34

## 2025-04-02 RX ADMIN — SODIUM CHLORIDE, POTASSIUM CHLORIDE, SODIUM LACTATE AND CALCIUM CHLORIDE 1000 ML: 600; 310; 30; 20 INJECTION, SOLUTION INTRAVENOUS at 08:38

## 2025-04-02 RX ADMIN — HYDROMORPHONE HYDROCHLORIDE 1 MG: 1 INJECTION, SOLUTION INTRAMUSCULAR; INTRAVENOUS; SUBCUTANEOUS at 08:34

## 2025-04-02 RX ADMIN — HEPARIN SODIUM (PORCINE) LOCK FLUSH IV SOLN 100 UNIT/ML 5 ML: 100 SOLUTION at 10:48

## 2025-04-02 RX ADMIN — HYDROMORPHONE HYDROCHLORIDE 1 MG: 1 INJECTION, SOLUTION INTRAMUSCULAR; INTRAVENOUS; SUBCUTANEOUS at 10:27

## 2025-04-02 RX ADMIN — ONDANSETRON 8 MG: 2 INJECTION INTRAMUSCULAR; INTRAVENOUS at 08:34

## 2025-04-02 RX ADMIN — DIPHENHYDRAMINE HYDROCHLORIDE 50 MG: 25 CAPSULE ORAL at 08:33

## 2025-04-02 RX ADMIN — HYDROMORPHONE HYDROCHLORIDE 1 MG: 1 INJECTION, SOLUTION INTRAMUSCULAR; INTRAVENOUS; SUBCUTANEOUS at 09:30

## 2025-04-02 NOTE — TELEPHONE ENCOUNTER
Narcotic Refill Request    Medication(s) requested:  Oxycodone  Person Requesting Refill:Jennifer (pt)   What pain is the medication treating: sickle cell pain  How is the medication being taken?:10mg every 6 hours as needed.   Does pt have enough for today? Pt states will be due Tomorrow 4/3 for   Is pain being adequately controlled on the current regimen?: okay requires IVF/Pain up to 3times weekly on some weeks.   Experiencing any side effects from medication?: denies    Date of most recent appointment:  3/17/2025 Dr. Duncan  Any No Show Visits:none recently  Next appointment:   pending to be scheduled  Last fill date and by whom:  Dr. Duncan on 3/24/2025   Reviewed: Not an agent for Dr. Cogan    Send to provider: routed to on call Dr. Cogan (as Dr. Duncan and Patricia Mantilla are out of clinic this week) and routed to rNCC A[place]

## 2025-04-02 NOTE — TELEPHONE ENCOUNTER
Jennifer is calling in.  She is currently at [facility] for infusion and got her first dose of pain medication 15-20 min ago.   She thinks she will be there about 2.5 hours longer.  She does not have a ride home.  She is requesting this writer reach out to the  to assist with transportation home. She is wondering if she could maybe get a voucher.  Jennifer states she recently had an insurance change from [employer] to [employer], and [place] requires a 3 day notice for ride approval.     Informed pt that this writer would reach out to  to assist.     0915: Secure chat sent to  to request assistance with transportation.   is assisting with transportation.

## 2025-04-02 NOTE — TELEPHONE ENCOUNTER
Oncology Nurse Triage - Sickle Cell Pain Crisis:    Situation: Jennifer  calling about Sickle Cell Pain Crisis, requesting to be added on for IV fluids and pain medicine    Background:     Patient's last infusion was 3/31/2025  Last clinic visit date:3/17/2025 Dr. fernandez  Does patient have active treatment plan? Yes    Assessment of Symptoms:  Onset/Duration of symptoms: 1 day    Is it typical sickle cell pain? Yes  Location: chest wall  Character: Sharp  Intensity: 8/10    Any radiation of pain, numbness, tingling, weakness, warmth, swelling, discoloration of arms or legs?No    Fever? No    Chest Pain Present: No new chest pain    Shortness of breath: No    Other home therapies tried: HEAT/HEATING PAD and WARM BATH     Last home medication taken and when: yesterday around 10pm    Any Refills Needed?: No, will need refill for tomorrow Oxycodone 10mg,  [place]    Does patient have transportation & length of time to get to clinic: Yes  If appt offered a few hours out then would consider a ride.     Recommendations:   Meets protocol    0725 This writer offer for [place] location at 8:00am.     0728 Scheduling request sent    Please note, if you are late for your appt, you risk losing your infusion appt as it may delay another patient's infusion who arrived on time.

## 2025-04-02 NOTE — PROGRESS NOTES
Infusion Nursing Note:  Jennifer Cervantes presents today for IV fluids and pain meds.    Patient seen by provider today: No   present during visit today: Not Applicable.    Note: Pt here for pain meds and IV fluids. Rating pain 8/10 at start of infusion. Pain 5/10 at end of infusion. Pt stated that she got blood transfusion on Monday which usually causes an increase in pain afterwards.       Intravenous Access:  Implanted Port.    Treatment Conditions:  Not Applicable.    Pt received  1L LR  1mg IV dilaudid x3  30mg IV ketoralac  8mg IV zofran  50mg PO benadryl      Post Infusion Assessment:  Patient tolerated infusion without incident.  Blood return noted pre and post infusion.  Site patent and intact, free from redness, edema or discomfort.  No evidence of extravasations.  Access discontinued per protocol.       Discharge Plan:   Discharge instructions reviewed with: Patient.  Patient and/or family verbalized understanding of discharge instructions and all questions answered.  Patient discharged in stable condition accompanied by: self.  Departure Mode: Ambulatory.      Ruth Baird RN

## 2025-04-02 NOTE — PROGRESS NOTES
Ambulatory Care Management- Transportation Support  Oncology Clinic     Data/Intervention:  Patient Name: Jennifer Cervantes     /Age: 2000 (24 year old)     CCRC team member collaborated with following Oncology SW regarding this request: From Nurse Triage for transportation      Assessment:  CHW/CTA called [employer] to arrange medical appointment transportation for a will call ride home from the [address] [place], [place] clinic Patient will need to call when ready for return ride home [phone] for a pickup.   Plan:  Patient is aware of the transportation plan.  available to assist with any other needs.         Isaura OTTO Community Health Worker  Clinic Care Coordination  [facility]  Phone: [phone]

## 2025-04-03 ENCOUNTER — APPOINTMENT (OUTPATIENT)
Dept: ULTRASOUND IMAGING | Facility: CLINIC | Age: 25
End: 2025-04-03
Attending: STUDENT IN AN ORGANIZED HEALTH CARE EDUCATION/TRAINING PROGRAM
Payer: MEDICAID

## 2025-04-03 ENCOUNTER — HOSPITAL ENCOUNTER (EMERGENCY)
Facility: CLINIC | Age: 25
End: 2025-04-03
Attending: STUDENT IN AN ORGANIZED HEALTH CARE EDUCATION/TRAINING PROGRAM
Payer: MEDICAID

## 2025-04-03 ENCOUNTER — APPOINTMENT (OUTPATIENT)
Dept: GENERAL RADIOLOGY | Facility: CLINIC | Age: 25
End: 2025-04-03
Attending: STUDENT IN AN ORGANIZED HEALTH CARE EDUCATION/TRAINING PROGRAM
Payer: MEDICAID

## 2025-04-03 VITALS
HEART RATE: 98 BPM | OXYGEN SATURATION: 93 % | TEMPERATURE: 98.1 F | RESPIRATION RATE: 16 BRPM | SYSTOLIC BLOOD PRESSURE: 162 MMHG | DIASTOLIC BLOOD PRESSURE: 68 MMHG

## 2025-04-03 DIAGNOSIS — D57.00 SICKLE CELL DISEASE WITH CRISIS (H): ICD-10-CM

## 2025-04-03 DIAGNOSIS — R07.9 CHEST PAIN, UNSPECIFIED TYPE: ICD-10-CM

## 2025-04-03 LAB
ALBUMIN SERPL BCG-MCNC: 4.4 G/DL (ref 3.5–5.2)
ALP SERPL-CCNC: 60 U/L (ref 40–150)
ALT SERPL W P-5'-P-CCNC: 25 U/L (ref 0–50)
ANION GAP SERPL CALCULATED.3IONS-SCNC: 12 MMOL/L (ref 7–15)
AST SERPL W P-5'-P-CCNC: 46 U/L (ref 0–45)
ATRIAL RATE - MUSE: 89 BPM
BASOPHILS # BLD AUTO: 0.2 10E3/UL (ref 0–0.2)
BASOPHILS NFR BLD AUTO: 1 %
BILIRUB SERPL-MCNC: 3.1 MG/DL
BUN SERPL-MCNC: 14.9 MG/DL (ref 6–20)
CALCIUM SERPL-MCNC: 8.9 MG/DL (ref 8.8–10.4)
CHLORIDE SERPL-SCNC: 101 MMOL/L (ref 98–107)
CREAT SERPL-MCNC: 0.55 MG/DL (ref 0.51–0.95)
D DIMER PPP FEU-MCNC: 1.77 UG/ML FEU (ref 0–0.5)
DIASTOLIC BLOOD PRESSURE - MUSE: NORMAL MMHG
EGFRCR SERPLBLD CKD-EPI 2021: >90 ML/MIN/1.73M2
EOSINOPHIL # BLD AUTO: 0.5 10E3/UL (ref 0–0.7)
EOSINOPHIL NFR BLD AUTO: 4 %
ERYTHROCYTE [DISTWIDTH] IN BLOOD BY AUTOMATED COUNT: 21.5 % (ref 10–15)
GLUCOSE SERPL-MCNC: 90 MG/DL (ref 70–99)
HCG SERPL QL: NEGATIVE
HCO3 SERPL-SCNC: 22 MMOL/L (ref 22–29)
HCT VFR BLD AUTO: 20.9 % (ref 35–47)
HGB BLD-MCNC: 7.5 G/DL (ref 11.7–15.7)
IMM GRANULOCYTES # BLD: 0.1 10E3/UL
IMM GRANULOCYTES NFR BLD: 0 %
INTERPRETATION ECG - MUSE: NORMAL
LYMPHOCYTES # BLD AUTO: 2.6 10E3/UL (ref 0.8–5.3)
LYMPHOCYTES NFR BLD AUTO: 21 %
MCH RBC QN AUTO: 29.9 PG (ref 26.5–33)
MCHC RBC AUTO-ENTMCNC: 35.9 G/DL (ref 31.5–36.5)
MCV RBC AUTO: 83 FL (ref 78–100)
MONOCYTES # BLD AUTO: 1 10E3/UL (ref 0–1.3)
MONOCYTES NFR BLD AUTO: 8 %
NEUTROPHILS # BLD AUTO: 8.2 10E3/UL (ref 1.6–8.3)
NEUTROPHILS NFR BLD AUTO: 66 %
NRBC # BLD AUTO: 0.1 10E3/UL
NRBC BLD AUTO-RTO: 1 /100
P AXIS - MUSE: 67 DEGREES
PLATELET # BLD AUTO: 448 10E3/UL (ref 150–450)
POTASSIUM SERPL-SCNC: 3.9 MMOL/L (ref 3.4–5.3)
PR INTERVAL - MUSE: 156 MS
PROT SERPL-MCNC: 7.8 G/DL (ref 6.4–8.3)
QRS DURATION - MUSE: 78 MS
QT - MUSE: 382 MS
QTC - MUSE: 464 MS
R AXIS - MUSE: 36 DEGREES
RBC # BLD AUTO: 2.51 10E6/UL (ref 3.8–5.2)
SODIUM SERPL-SCNC: 135 MMOL/L (ref 135–145)
SYSTOLIC BLOOD PRESSURE - MUSE: NORMAL MMHG
T AXIS - MUSE: 25 DEGREES
TROPONIN T SERPL HS-MCNC: <6 NG/L
VENTRICULAR RATE- MUSE: 89 BPM
WBC # BLD AUTO: 12.4 10E3/UL (ref 4–11)

## 2025-04-03 PROCEDURE — 93010 ELECTROCARDIOGRAM REPORT: CPT | Performed by: STUDENT IN AN ORGANIZED HEALTH CARE EDUCATION/TRAINING PROGRAM

## 2025-04-03 PROCEDURE — 93971 EXTREMITY STUDY: CPT | Mod: LT

## 2025-04-03 PROCEDURE — 99285 EMERGENCY DEPT VISIT HI MDM: CPT | Performed by: STUDENT IN AN ORGANIZED HEALTH CARE EDUCATION/TRAINING PROGRAM

## 2025-04-03 PROCEDURE — 85004 AUTOMATED DIFF WBC COUNT: CPT | Performed by: STUDENT IN AN ORGANIZED HEALTH CARE EDUCATION/TRAINING PROGRAM

## 2025-04-03 PROCEDURE — 93005 ELECTROCARDIOGRAM TRACING: CPT | Performed by: STUDENT IN AN ORGANIZED HEALTH CARE EDUCATION/TRAINING PROGRAM

## 2025-04-03 PROCEDURE — 85379 FIBRIN DEGRADATION QUANT: CPT | Performed by: STUDENT IN AN ORGANIZED HEALTH CARE EDUCATION/TRAINING PROGRAM

## 2025-04-03 PROCEDURE — 99291 CRITICAL CARE FIRST HOUR: CPT | Mod: 25 | Performed by: STUDENT IN AN ORGANIZED HEALTH CARE EDUCATION/TRAINING PROGRAM

## 2025-04-03 PROCEDURE — 84703 CHORIONIC GONADOTROPIN ASSAY: CPT | Performed by: STUDENT IN AN ORGANIZED HEALTH CARE EDUCATION/TRAINING PROGRAM

## 2025-04-03 PROCEDURE — 258N000003 HC RX IP 258 OP 636: Performed by: STUDENT IN AN ORGANIZED HEALTH CARE EDUCATION/TRAINING PROGRAM

## 2025-04-03 PROCEDURE — 82565 ASSAY OF CREATININE: CPT | Performed by: STUDENT IN AN ORGANIZED HEALTH CARE EDUCATION/TRAINING PROGRAM

## 2025-04-03 PROCEDURE — 84155 ASSAY OF PROTEIN SERUM: CPT | Performed by: STUDENT IN AN ORGANIZED HEALTH CARE EDUCATION/TRAINING PROGRAM

## 2025-04-03 PROCEDURE — 96374 THER/PROPH/DIAG INJ IV PUSH: CPT | Performed by: STUDENT IN AN ORGANIZED HEALTH CARE EDUCATION/TRAINING PROGRAM

## 2025-04-03 PROCEDURE — 82435 ASSAY OF BLOOD CHLORIDE: CPT | Performed by: STUDENT IN AN ORGANIZED HEALTH CARE EDUCATION/TRAINING PROGRAM

## 2025-04-03 PROCEDURE — 84484 ASSAY OF TROPONIN QUANT: CPT | Performed by: STUDENT IN AN ORGANIZED HEALTH CARE EDUCATION/TRAINING PROGRAM

## 2025-04-03 PROCEDURE — 96375 TX/PRO/DX INJ NEW DRUG ADDON: CPT | Performed by: STUDENT IN AN ORGANIZED HEALTH CARE EDUCATION/TRAINING PROGRAM

## 2025-04-03 PROCEDURE — 250N000011 HC RX IP 250 OP 636: Mod: JZ | Performed by: STUDENT IN AN ORGANIZED HEALTH CARE EDUCATION/TRAINING PROGRAM

## 2025-04-03 PROCEDURE — 36415 COLL VENOUS BLD VENIPUNCTURE: CPT | Performed by: STUDENT IN AN ORGANIZED HEALTH CARE EDUCATION/TRAINING PROGRAM

## 2025-04-03 PROCEDURE — 85045 AUTOMATED RETICULOCYTE COUNT: CPT | Performed by: STUDENT IN AN ORGANIZED HEALTH CARE EDUCATION/TRAINING PROGRAM

## 2025-04-03 PROCEDURE — 71046 X-RAY EXAM CHEST 2 VIEWS: CPT

## 2025-04-03 RX ORDER — HYDROMORPHONE HYDROCHLORIDE 1 MG/ML
1 INJECTION, SOLUTION INTRAMUSCULAR; INTRAVENOUS; SUBCUTANEOUS
Status: DISPENSED | OUTPATIENT
Start: 2025-04-03

## 2025-04-03 RX ORDER — SODIUM CHLORIDE, SODIUM LACTATE, POTASSIUM CHLORIDE, CALCIUM CHLORIDE 600; 310; 30; 20 MG/100ML; MG/100ML; MG/100ML; MG/100ML
INJECTION, SOLUTION INTRAVENOUS CONTINUOUS
Status: DISPENSED | OUTPATIENT
Start: 2025-04-03

## 2025-04-03 RX ORDER — KETOROLAC TROMETHAMINE 30 MG/ML
30 INJECTION, SOLUTION INTRAMUSCULAR; INTRAVENOUS ONCE
Status: COMPLETED | OUTPATIENT
Start: 2025-04-03 | End: 2025-04-03

## 2025-04-03 RX ORDER — ONDANSETRON 2 MG/ML
8 INJECTION INTRAMUSCULAR; INTRAVENOUS ONCE
Status: COMPLETED | OUTPATIENT
Start: 2025-04-03 | End: 2025-04-03

## 2025-04-03 RX ADMIN — ONDANSETRON 8 MG: 2 INJECTION INTRAMUSCULAR; INTRAVENOUS at 23:23

## 2025-04-03 RX ADMIN — SODIUM CHLORIDE, SODIUM LACTATE, POTASSIUM CHLORIDE, AND CALCIUM CHLORIDE: .6; .31; .03; .02 INJECTION, SOLUTION INTRAVENOUS at 23:59

## 2025-04-03 RX ADMIN — KETOROLAC TROMETHAMINE 30 MG: 30 INJECTION, SOLUTION INTRAMUSCULAR at 23:22

## 2025-04-03 RX ADMIN — HYDROMORPHONE HYDROCHLORIDE 1 MG: 1 INJECTION, SOLUTION INTRAMUSCULAR; INTRAVENOUS; SUBCUTANEOUS at 23:23

## 2025-04-03 ASSESSMENT — ACTIVITIES OF DAILY LIVING (ADL): ADLS_ACUITY_SCORE: 58

## 2025-04-04 VITALS
SYSTOLIC BLOOD PRESSURE: 129 MMHG | TEMPERATURE: 98.1 F | DIASTOLIC BLOOD PRESSURE: 68 MMHG | RESPIRATION RATE: 16 BRPM | OXYGEN SATURATION: 93 % | HEART RATE: 84 BPM

## 2025-04-04 LAB
ATRIAL RATE - MUSE: 89 BPM
DIASTOLIC BLOOD PRESSURE - MUSE: NORMAL MMHG
INTERPRETATION ECG - MUSE: NORMAL
P AXIS - MUSE: 67 DEGREES
PR INTERVAL - MUSE: 156 MS
QRS DURATION - MUSE: 78 MS
QT - MUSE: 382 MS
QTC - MUSE: 464 MS
R AXIS - MUSE: 36 DEGREES
RETICS # AUTO: 0.42 10E6/UL (ref 0.03–0.1)
RETICS/RBC NFR AUTO: 15.1 % (ref 0.5–2)
SYSTOLIC BLOOD PRESSURE - MUSE: NORMAL MMHG
T AXIS - MUSE: 25 DEGREES
VENTRICULAR RATE- MUSE: 89 BPM

## 2025-04-04 PROCEDURE — 250N000011 HC RX IP 250 OP 636: Mod: JZ | Performed by: STUDENT IN AN ORGANIZED HEALTH CARE EDUCATION/TRAINING PROGRAM

## 2025-04-04 PROCEDURE — 96361 HYDRATE IV INFUSION ADD-ON: CPT | Performed by: STUDENT IN AN ORGANIZED HEALTH CARE EDUCATION/TRAINING PROGRAM

## 2025-04-04 PROCEDURE — 250N000009 HC RX 250: Performed by: STUDENT IN AN ORGANIZED HEALTH CARE EDUCATION/TRAINING PROGRAM

## 2025-04-04 PROCEDURE — 96376 TX/PRO/DX INJ SAME DRUG ADON: CPT | Performed by: STUDENT IN AN ORGANIZED HEALTH CARE EDUCATION/TRAINING PROGRAM

## 2025-04-04 RX ORDER — IPRATROPIUM BROMIDE AND ALBUTEROL SULFATE 2.5; .5 MG/3ML; MG/3ML
3 SOLUTION RESPIRATORY (INHALATION) ONCE
Status: COMPLETED | OUTPATIENT
Start: 2025-04-04 | End: 2025-04-04

## 2025-04-04 RX ADMIN — IPRATROPIUM BROMIDE AND ALBUTEROL SULFATE 3 ML: .5; 3 SOLUTION RESPIRATORY (INHALATION) at 00:21

## 2025-04-04 RX ADMIN — HYDROMORPHONE HYDROCHLORIDE 1 MG: 1 INJECTION, SOLUTION INTRAMUSCULAR; INTRAVENOUS; SUBCUTANEOUS at 00:21

## 2025-04-04 RX ADMIN — HYDROMORPHONE HYDROCHLORIDE 1 MG: 1 INJECTION, SOLUTION INTRAMUSCULAR; INTRAVENOUS; SUBCUTANEOUS at 01:29

## 2025-04-04 ASSESSMENT — ACTIVITIES OF DAILY LIVING (ADL)
ADLS_ACUITY_SCORE: 58
ADLS_ACUITY_SCORE: 58

## 2025-04-04 NOTE — ED TRIAGE NOTES
sharp chest wall pain radiates to back. Out of oxycodone, was going to pickup today but didn't have a ride. 7/10 pain     Triage Assessment (Adult)       Row Name 04/03/25 3483          Triage Assessment    Airway WDL WDL        Respiratory WDL    Respiratory WDL WDL        Skin Circulation/Temperature WDL    Skin Circulation/Temperature WDL WDL        Cardiac WDL    Cardiac WDL X;chest pain        Chest Pain Assessment    Chest Pain Location midsternal     Character sharp        Peripheral/Neurovascular WDL    Peripheral Neurovascular WDL WDL        Cognitive/Neuro/Behavioral WDL    Cognitive/Neuro/Behavioral WDL WDL

## 2025-04-04 NOTE — DISCHARGE INSTRUCTIONS
You were seen in the emergency department due to chest pain and sickle cell crisis.  You were given your pain medications and felt better.  Your x-ray does not show any signs of any acute chest for pneumonia, and ultrasound shows no signs of blood clots.    We discussed obtaining VQ scan to rule out blood clot, but you were not interested in staying overnight tonight.    Return to the emerged department with any worsening severe difficulty breathing, feeling as though you are in a pass out or any other concerning symptoms

## 2025-04-04 NOTE — ED PROVIDER NOTES
ED Provider Note  [facility]      History     Chief Complaint   Patient presents with    Chest Wall Pain     sharp chest wall pain radiates to back. Out of oxycodone, was going to pickup today but didn't have a ride. 7/10 pain       HPI  Jennifer Cervantes is a 25 year old female with a history of sickle cell disease with pain crisis, acute chest syndrome, migraines, depression, stroke, spasticity, pulmonary embolism  who presents to the emergency department for chest wall pain.***    Acute Pain Crisis Treatment: (limiting IV dosing)  ER   Dilaudid 1 mg IV q1h up to 3 doses  Toradol 30 mg IV x1   Maintenance IV fluids with LR  Other: Zofran 8 mg IV PRN nausea      Past Medical History  Past Medical History:   Diagnosis Date    Anxiety     Bleeding disorder     Blood clotting disorder     Cerebral infarction (H) 2015    Cognitive developmental delay     low IQ. 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 side (H)     right hand contractures    Iron overload due to repeated red blood cell transfusions     Migraines     Multiple subsegmental pulmonary emboli without acute cor pulmonale (H) 02/01/2021    Oppositional defiant behavior     Presence of intrauterine contraceptive device 2/18/2020    Superficial venous thrombosis of arm, right 03/25/2021    Uncomplicated asthma      Past Surgical History:   Procedure Laterality Date    AS INSERT TUNNELED CV 2 CATH W/O PORT/PUMP      CHOLECYSTECTOMY      CV RIGHT HEART CATH MEASUREMENTS RECORDED N/A 11/18/2021    Procedure: Right Heart Cath;  Surgeon: Jackson Stauffer MD;  Location:  HEART CARDIAC CATH LAB    INSERT PORT VASCULAR ACCESS Left 4/21/2021    Procedure: INSERTION, VASCULAR ACCESS PORT (NOT SURE ON SIDE UNTIL REMOVAL);  Surgeon: Rajan More MD;  [place] [facility] [place]    IR CHEST PORT PLACEMENT > 5 YRS OF AGE  4/21/2021     IR CVC NON TUNNEL LINE REMOVAL  5/6/2021    IR CVC NON TUNNEL PLACEMENT > 5 YRS  04/07/2020    IR CVC NON TUNNEL PLACEMENT > 5 YRS  4/30/2021    IR CVC NON TUNNEL PLACEMENT > 5 YRS  9/7/2022    IR PORT REMOVAL LEFT  4/21/2021    REMOVE PORT VASCULAR ACCESS Left 4/21/2021    Procedure: REMOVAL, VASCULAR ACCESS PORT LEFT;  Surgeon: Rajan More MD;  Location: [facility] OR    REPAIR TENDON ELBOW Right 10/02/2019    Procedure: Right Elbow Flexor Lengthening, Flexor Pronator Slide Of Wrist and Finger, Thumb Adductor Lengthening;  Surgeon: Anai Franco MD;  Location: [facility] OR    TONSILLECTOMY Bilateral 10/02/2019    Procedure: Bilateral Tonsillectomy;  Surgeon: Farhana Guy MD;  Location: [facility] OR    ZZC BREAST REDUCTION (INCLUDES LIPO) TIER 3 Bilateral 04/23/2019     albuterol (PROVENTIL) (2.5 MG/3ML) 0.083% neb solution  aspirin (ASA) 81 MG chewable tablet  budesonide-formoterol (SYMBICORT/BREYNA) 160-4.5 MCG/ACT Inhaler  deferasirox (JADENU) 360 MG tablet  Deferiprone, Twice Daily, 1000 MG TABS  diclofenac (VOLTAREN) 1 % topical gel  diphenhydrAMINE (BENADRYL) 50 MG capsule  EPINEPHrine (ANY BX GENERIC EQUIV) 0.3 MG/0.3ML injection 2-pack  gabapentin (NEURONTIN) 300 MG capsule  hydroxyurea (HYDREA) 500 MG capsule  ibuprofen (ADVIL/MOTRIN) 800 MG tablet  methocarbamol (ROBAXIN) 750 MG tablet  methylPREDNISolone (MEDROL) 32 MG tablet  naloxone (NARCAN) 4 MG/0.1ML nasal spray  naproxen (NAPROSYN) 500 MG tablet  oxyCODONE IR (ROXICODONE) 10 MG tablet      Allergies   Allergen Reactions    Contrast Dye Angioedema     Hives and breathing issues    Fish-Derived Products Hives    Seafood Hives    Adhesive Tape Hives     Primipore dressing causes hives    Gadolinium     Iodinated Contrast Media      Family History  Family History   Problem Relation Age of Onset    Sickle Cell Trait Mother     Hypertension Mother     Asthma Mother     Sickle Cell Trait Father     Glaucoma No family hx of     Macular  "Degeneration No family hx of     Diabetes No family hx of     Gout No family hx of      Social History   Social History     Tobacco Use    Smoking status: Never     Passive exposure: Never    Smokeless tobacco: Never   Substance Use Topics    Alcohol use: Not Currently     Alcohol/week: 0.0 standard drinks of alcohol    Drug use: Never      A medically appropriate review of systems was performed with pertinent positives and negatives noted in the HPI, and all other systems negative.    Physical Exam   BP: (!) 162/68  Pulse: 98  Temp: 98.1  F (36.7  C)  Resp: 16  SpO2: 93 %  Physical Exam  GEN: Well appearing, non toxic, cooperative  HEENT: normocephalic and atraumatic, PERRLA, EOMI  CV: well-perfused, normal skin color for ethnicity  PULM: breathing comfortably, in no respiratory distress  ABD: nondistended  EXT: Full range of motion.  No edema.  NEURO: awake, conversant, grossly normal bilateral upper and lower extremity strength & ROM   SKIN: No rashes, ecchymosis, or lacerations  PSYCH: Calm and cooperative, interactive     ED Course, Procedures, & Data      Procedures       {ED Course Selections (Optional):594671}  {ED Sepsis CMS Documentation (Optional):429236::\" \"}       No results found for any visits on 04/03/25.  Medications   HYDROmorphone (PF) (DILAUDID) injection 1 mg (has no administration in time range)   ketorolac (TORADOL) injection 30 mg (has no administration in time range)   ondansetron (ZOFRAN) injection 8 mg (has no administration in time range)   lactated ringers infusion (has no administration in time range)     Labs Ordered and Resulted from Time of ED Arrival to Time of ED Departure - No data to display  XR Chest 2 Views    (Results Pending)          {Critical Care Performed?:446174}    Assessment & Plan    ***    I have reviewed the nursing notes. I have reviewed the findings, diagnosis, plan and need for follow up with the patient.    New Prescriptions    No medications on file       Final " diagnoses:   None       Lisa Coombs MD  [facility] EMERGENCY DEPARTMENT  4/3/2025   mmol/L    Urea Nitrogen 14.9 6.0 - 20.0 mg/dL    Creatinine 0.55 0.51 - 0.95 mg/dL    GFR Estimate >90 >60 mL/min/1.73m2    Calcium 8.9 8.8 - 10.4 mg/dL    Chloride 101 98 - 107 mmol/L    Glucose 90 70 - 99 mg/dL    Alkaline Phosphatase 60 40 - 150 U/L    AST 46 (H) 0 - 45 U/L    ALT 25 0 - 50 U/L    Protein Total 7.8 6.4 - 8.3 g/dL    Albumin 4.4 3.5 - 5.2 g/dL    Bilirubin Total 3.1 (H) <=1.2 mg/dL   Troponin T, High Sensitivity     Status: Normal   Result Value Ref Range    Troponin T, High Sensitivity <6 <=14 ng/L   Reticulocyte count     Status: Abnormal   Result Value Ref Range    % Reticulocyte 15.1 (H) 0.5 - 2.0 %    Absolute Reticulocyte 0.417 (H) 0.025 - 0.095 10e6/uL   HCG qualitative pregnancy (blood)     Status: Normal   Result Value Ref Range    hCG Serum Qualitative Negative Negative   D dimer quantitative     Status: Abnormal   Result Value Ref Range    D-Dimer Quantitative 1.77 (H) 0.00 - 0.50 ug/mL FEU    Narrative    This D-dimer assay is intended for use in conjunction with a clinical pretest probability assessment model to exclude pulmonary embolism (PE) and deep venous thrombosis (DVT) in outpatients suspected of PE or DVT. The cut-off value is 0.50 ug/mL FEU.   CBC with platelets and differential     Status: Abnormal   Result Value Ref Range    WBC Count 12.4 (H) 4.0 - 11.0 10e3/uL    RBC Count 2.51 (L) 3.80 - 5.20 10e6/uL    Hemoglobin 7.5 (L) 11.7 - 15.7 g/dL    Hematocrit 20.9 (L) 35.0 - 47.0 %    MCV 83 78 - 100 fL    MCH 29.9 26.5 - 33.0 pg    MCHC 35.9 31.5 - 36.5 g/dL    RDW 21.5 (H) 10.0 - 15.0 %    Platelet Count 448 150 - 450 10e3/uL    % Neutrophils 66 %    % Lymphocytes 21 %    % Monocytes 8 %    % Eosinophils 4 %    % Basophils 1 %    % Immature Granulocytes 0 %    NRBCs per 100 WBC 1 (H) <1 /100    Absolute Neutrophils 8.2 1.6 - 8.3 10e3/uL    Absolute Lymphocytes 2.6 0.8 - 5.3 10e3/uL    Absolute Monocytes 1.0 0.0 - 1.3 10e3/uL    Absolute Eosinophils 0.5 0.0 - 0.7 10e3/uL     Absolute Basophils 0.2 0.0 - 0.2 10e3/uL    Absolute Immature Granulocytes 0.1 <=0.4 10e3/uL    Absolute NRBCs 0.1 10e3/uL   EKG 12-lead, tracing only     Status: None (Preliminary result)   Result Value Ref Range    Systolic Blood Pressure  mmHg    Diastolic Blood Pressure  mmHg    Ventricular Rate 89 BPM    Atrial Rate 89 BPM    AZ Interval 156 ms    QRS Duration 78 ms     ms    QTc 464 ms    P Axis 67 degrees    R AXIS 36 degrees    T Axis 25 degrees    Interpretation ECG Sinus rhythm  Normal ECG      CBC with platelets differential     Status: Abnormal    Narrative    The following orders were created for panel order CBC with platelets differential.  Procedure                               Abnormality         Status                     ---------                               -----------         ------                     CBC with platelets and ...[[id number]]  Abnormal            Final result                 Please view results for these tests on the individual orders.     Medications   lactated ringers infusion ( Intravenous Not Given 4/4/25 0123)   HYDROmorphone (PF) (DILAUDID) injection 1 mg (1 mg Intravenous $Given 4/4/25 0129)   ketorolac (TORADOL) injection 30 mg (30 mg Intravenous $Given 4/3/25 2322)   ondansetron (ZOFRAN) injection 8 mg (8 mg Intravenous $Given 4/3/25 2323)   ipratropium - albuterol 0.5 mg/2.5 mg/3 mL (DUONEB) neb solution 3 mL (3 mLs Nebulization $Given 4/4/25 0021)     Labs Ordered and Resulted from Time of ED Arrival to Time of ED Departure   COMPREHENSIVE METABOLIC PANEL - Abnormal       Result Value    Sodium 135      Potassium 3.9      Carbon Dioxide (CO2) 22      Anion Gap 12      Urea Nitrogen 14.9      Creatinine 0.55      GFR Estimate >90      Calcium 8.9      Chloride 101      Glucose 90      Alkaline Phosphatase 60      AST 46 (*)     ALT 25      Protein Total 7.8      Albumin 4.4      Bilirubin Total 3.1 (*)    RETICULOCYTE COUNT - Abnormal    % Reticulocyte 15.1 (*)      Absolute Reticulocyte 0.417 (*)    D DIMER QUANTITATIVE - Abnormal    D-Dimer Quantitative 1.77 (*)    CBC WITH PLATELETS AND DIFFERENTIAL - Abnormal    WBC Count 12.4 (*)     RBC Count 2.51 (*)     Hemoglobin 7.5 (*)     Hematocrit 20.9 (*)     MCV 83      MCH 29.9      MCHC 35.9      RDW 21.5 (*)     Platelet Count 448      % Neutrophils 66      % Lymphocytes 21      % Monocytes 8      % Eosinophils 4      % Basophils 1      % Immature Granulocytes 0      NRBCs per 100 WBC 1 (*)     Absolute Neutrophils 8.2      Absolute Lymphocytes 2.6      Absolute Monocytes 1.0      Absolute Eosinophils 0.5      Absolute Basophils 0.2      Absolute Immature Granulocytes 0.1      Absolute NRBCs 0.1     TROPONIN T, HIGH SENSITIVITY - Normal    Troponin T, High Sensitivity <6     HCG QUALITATIVE PREGNANCY - Normal    hCG Serum Qualitative Negative       XR Chest 2 Views   Final Result   IMPRESSION: Heart size and pulmonary vascularity normal. The lungs are clear. Left IJ Port-A-Cath tip distal SVC. No pneumothorax. Clip right upper quadrant.      US Lower Extremity Venous Duplex Left   Final Result   IMPRESSION:   1.  No deep venous thrombosis in the left lower extremity.      NM Lung Scan Ventilation and Perfusion    (Results Pending)          Critical care was not performed.     Medical Decision Making  The patient's presentation was of high complexity (a chronic illness severe exacerbation, progression, or side effect of treatment).    The patient's evaluation involved:  review of external note(s) from 3+ sources (see separate area of note for details)  review of 3+ test result(s) ordered prior to this encounter (see separate area of note for details)  ordering and/or review of 3+ test(s) in this encounter (see separate area of note for details)    The patient's management necessitated high risk (a parenteral controlled substance).    Assessment & Plan    25-year-old female with past medical history of sickle cell anemia,  previous sickle acute chest syndrome and previous stroke with residual right upper extremity deficits, pulmonary embolism not currently on anticoagulation presenting to Emergency Department due to an acute sickle cell crisis presenting primarily with chest wall pain on the left side as well as left lateral chest wall/left upper back sharp sensation noted be otherwise hemodynamically stable in no acute distress although she does have some swelling in her left leg,    Differential for his broad includes acute chest syndrome, DVT, acute painful crisis, pneumonia.    Ultrasound of her lower extremity with no evidence of DVT, D-dimer is elevated but she does have an anaphylactic allergy to CT contrast dye.  Chest x-ray without any focal process appreciated.  She has received her pain medications here according to care plan.    She is feeling quite a bit better after her last dose of Dilaudid.  I did discuss her elevated D-dimer and need for VQ scan to definitively rule out PE although this is very similar to multiple previous presentations she has had as she has had a baseline elevated D-dimer likely in the setting of her sickle cell disease.  She declines VQ scan and admission at this time, and I believe this is reasonable and she will return with any persistent symptoms.  Does feel as though her pain is gotten quite a bit better.  Feels comfortable with the plan for discharge home tonight    I have reviewed the nursing notes. I have reviewed the findings, diagnosis, plan and need for follow up with the patient.    New Prescriptions    No medications on file       Final diagnoses:   Sickle cell disease with crisis (H)   Chest pain, unspecified type       Lisa Coombs MD  [facility] EMERGENCY DEPARTMENT  4/3/2025     Lisa Coombs MD  04/04/25 0150

## 2025-04-06 ENCOUNTER — APPOINTMENT (OUTPATIENT)
Dept: GENERAL RADIOLOGY | Facility: CLINIC | Age: 25
End: 2025-04-06
Attending: EMERGENCY MEDICINE
Payer: MEDICAID

## 2025-04-06 ENCOUNTER — HOSPITAL ENCOUNTER (EMERGENCY)
Facility: CLINIC | Age: 25
Discharge: HOME | End: 2025-04-06
Attending: EMERGENCY MEDICINE | Admitting: EMERGENCY MEDICINE
Payer: MEDICAID

## 2025-04-06 VITALS
OXYGEN SATURATION: 98 % | DIASTOLIC BLOOD PRESSURE: 56 MMHG | TEMPERATURE: 98.1 F | BODY MASS INDEX: 25.61 KG/M2 | HEART RATE: 91 BPM | SYSTOLIC BLOOD PRESSURE: 95 MMHG | RESPIRATION RATE: 16 BRPM | HEIGHT: 64 IN | WEIGHT: 150 LBS

## 2025-04-06 DIAGNOSIS — K62.5 RECTAL BLEEDING: ICD-10-CM

## 2025-04-06 DIAGNOSIS — M25.562 ACUTE PAIN OF LEFT KNEE: ICD-10-CM

## 2025-04-06 DIAGNOSIS — D57.00 SICKLE CELL PAIN CRISIS (H): ICD-10-CM

## 2025-04-06 LAB
ALBUMIN SERPL BCG-MCNC: 4.4 G/DL (ref 3.5–5.2)
ALP SERPL-CCNC: 61 U/L (ref 40–150)
ALT SERPL W P-5'-P-CCNC: 28 U/L (ref 0–50)
ANION GAP SERPL CALCULATED.3IONS-SCNC: 11 MMOL/L (ref 7–15)
APTT PPP: 27 SECONDS (ref 22–38)
AST SERPL W P-5'-P-CCNC: 62 U/L (ref 0–45)
BASOPHILS # BLD AUTO: 0.3 10E3/UL (ref 0–0.2)
BASOPHILS NFR BLD AUTO: 2 %
BILIRUB SERPL-MCNC: 3.2 MG/DL
BUN SERPL-MCNC: 8 MG/DL (ref 6–20)
CALCIUM SERPL-MCNC: 8.9 MG/DL (ref 8.8–10.4)
CHLORIDE SERPL-SCNC: 106 MMOL/L (ref 98–107)
CREAT SERPL-MCNC: 0.5 MG/DL (ref 0.51–0.95)
EGFRCR SERPLBLD CKD-EPI 2021: >90 ML/MIN/1.73M2
EOSINOPHIL # BLD AUTO: 0.5 10E3/UL (ref 0–0.7)
EOSINOPHIL NFR BLD AUTO: 4 %
ERYTHROCYTE [DISTWIDTH] IN BLOOD BY AUTOMATED COUNT: 21 % (ref 10–15)
GLUCOSE SERPL-MCNC: 87 MG/DL (ref 70–99)
HCO3 SERPL-SCNC: 22 MMOL/L (ref 22–29)
HCT VFR BLD AUTO: 20.2 % (ref 35–47)
HGB BLD-MCNC: 7.2 G/DL (ref 11.7–15.7)
IMM GRANULOCYTES # BLD: 0 10E3/UL
IMM GRANULOCYTES NFR BLD: 0 %
INR PPP: 1.18 (ref 0.85–1.15)
LYMPHOCYTES # BLD AUTO: 2.6 10E3/UL (ref 0.8–5.3)
LYMPHOCYTES NFR BLD AUTO: 22 %
MCH RBC QN AUTO: 29 PG (ref 26.5–33)
MCHC RBC AUTO-ENTMCNC: 35.6 G/DL (ref 31.5–36.5)
MCV RBC AUTO: 82 FL (ref 78–100)
MONOCYTES # BLD AUTO: 1.1 10E3/UL (ref 0–1.3)
MONOCYTES NFR BLD AUTO: 9 %
NEUTROPHILS # BLD AUTO: 7.4 10E3/UL (ref 1.6–8.3)
NEUTROPHILS NFR BLD AUTO: 63 %
NRBC # BLD AUTO: 0.1 10E3/UL
NRBC BLD AUTO-RTO: 1 /100
PLATELET # BLD AUTO: 418 10E3/UL (ref 150–450)
POTASSIUM SERPL-SCNC: 4.1 MMOL/L (ref 3.4–5.3)
PROT SERPL-MCNC: 7.8 G/DL (ref 6.4–8.3)
RBC # BLD AUTO: 2.48 10E6/UL (ref 3.8–5.2)
SODIUM SERPL-SCNC: 139 MMOL/L (ref 135–145)
WBC # BLD AUTO: 11.9 10E3/UL (ref 4–11)

## 2025-04-06 PROCEDURE — 250N000011 HC RX IP 250 OP 636: Mod: JZ | Performed by: EMERGENCY MEDICINE

## 2025-04-06 PROCEDURE — 85018 HEMOGLOBIN: CPT | Performed by: EMERGENCY MEDICINE

## 2025-04-06 PROCEDURE — 99284 EMERGENCY DEPT VISIT MOD MDM: CPT | Mod: 25 | Performed by: EMERGENCY MEDICINE

## 2025-04-06 PROCEDURE — 85730 THROMBOPLASTIN TIME PARTIAL: CPT | Performed by: EMERGENCY MEDICINE

## 2025-04-06 PROCEDURE — 258N000003 HC RX IP 258 OP 636: Performed by: EMERGENCY MEDICINE

## 2025-04-06 PROCEDURE — 96361 HYDRATE IV INFUSION ADD-ON: CPT | Performed by: EMERGENCY MEDICINE

## 2025-04-06 PROCEDURE — 96376 TX/PRO/DX INJ SAME DRUG ADON: CPT | Performed by: EMERGENCY MEDICINE

## 2025-04-06 PROCEDURE — 85610 PROTHROMBIN TIME: CPT | Performed by: EMERGENCY MEDICINE

## 2025-04-06 PROCEDURE — 73502 X-RAY EXAM HIP UNI 2-3 VIEWS: CPT

## 2025-04-06 PROCEDURE — 96374 THER/PROPH/DIAG INJ IV PUSH: CPT | Performed by: EMERGENCY MEDICINE

## 2025-04-06 PROCEDURE — 80051 ELECTROLYTE PANEL: CPT | Performed by: EMERGENCY MEDICINE

## 2025-04-06 PROCEDURE — 96375 TX/PRO/DX INJ NEW DRUG ADDON: CPT | Performed by: EMERGENCY MEDICINE

## 2025-04-06 PROCEDURE — 85025 COMPLETE CBC W/AUTO DIFF WBC: CPT | Performed by: EMERGENCY MEDICINE

## 2025-04-06 PROCEDURE — 99285 EMERGENCY DEPT VISIT HI MDM: CPT | Performed by: EMERGENCY MEDICINE

## 2025-04-06 PROCEDURE — 73560 X-RAY EXAM OF KNEE 1 OR 2: CPT | Mod: LT

## 2025-04-06 PROCEDURE — 36415 COLL VENOUS BLD VENIPUNCTURE: CPT | Performed by: EMERGENCY MEDICINE

## 2025-04-06 RX ORDER — HYDROMORPHONE HYDROCHLORIDE 1 MG/ML
1 INJECTION, SOLUTION INTRAMUSCULAR; INTRAVENOUS; SUBCUTANEOUS
Status: COMPLETED | OUTPATIENT
Start: 2025-04-06 | End: 2025-04-06

## 2025-04-06 RX ORDER — KETOROLAC TROMETHAMINE 15 MG/ML
15 INJECTION, SOLUTION INTRAMUSCULAR; INTRAVENOUS ONCE
Status: COMPLETED | OUTPATIENT
Start: 2025-04-06 | End: 2025-04-06

## 2025-04-06 RX ORDER — ONDANSETRON 2 MG/ML
8 INJECTION INTRAMUSCULAR; INTRAVENOUS
Status: COMPLETED | OUTPATIENT
Start: 2025-04-06 | End: 2025-04-06

## 2025-04-06 RX ADMIN — KETOROLAC TROMETHAMINE 15 MG: 15 INJECTION, SOLUTION INTRAMUSCULAR; INTRAVENOUS at 03:30

## 2025-04-06 RX ADMIN — HYDROMORPHONE HYDROCHLORIDE 1 MG: 1 INJECTION, SOLUTION INTRAMUSCULAR; INTRAVENOUS; SUBCUTANEOUS at 04:31

## 2025-04-06 RX ADMIN — SODIUM CHLORIDE, SODIUM LACTATE, POTASSIUM CHLORIDE, AND CALCIUM CHLORIDE 1000 ML: .6; .31; .03; .02 INJECTION, SOLUTION INTRAVENOUS at 03:31

## 2025-04-06 RX ADMIN — HYDROMORPHONE HYDROCHLORIDE 1 MG: 1 INJECTION, SOLUTION INTRAMUSCULAR; INTRAVENOUS; SUBCUTANEOUS at 03:22

## 2025-04-06 RX ADMIN — HYDROMORPHONE HYDROCHLORIDE 1 MG: 1 INJECTION, SOLUTION INTRAMUSCULAR; INTRAVENOUS; SUBCUTANEOUS at 05:34

## 2025-04-06 RX ADMIN — ONDANSETRON 8 MG: 2 INJECTION INTRAMUSCULAR; INTRAVENOUS at 03:27

## 2025-04-06 ASSESSMENT — ACTIVITIES OF DAILY LIVING (ADL)
ADLS_ACUITY_SCORE: 58

## 2025-04-06 NOTE — ED TRIAGE NOTES
"Noticed \"a good amount of blood\" in stool this evening, no clots but states there was a large amount on each piece of stool; Said sickle cell doctor told her to come. States she has chronic abd pain, not any worse than normal. Spasming back pain when bearing down to push.       States she heard her L knee \"crack\" before coming in, about 1-2 hours ago. Is now worried as her entire L leg now is in pain. Unrelated to rectal bleeding. No reported numbness or tingling.         Triage Assessment (Adult)       Row Name 04/06/25 0125          Triage Assessment    Airway WDL WDL        Respiratory WDL    Respiratory WDL WDL        Skin Circulation/Temperature WDL    Skin Circulation/Temperature WDL WDL        Cardiac WDL    Cardiac WDL WDL        Peripheral/Neurovascular WDL    Peripheral Neurovascular WDL WDL     Capillary Refill, General less than/equal to 3 secs        Cognitive/Neuro/Behavioral WDL    Cognitive/Neuro/Behavioral WDL WDL        Terrell Coma Scale    Best Eye Response 4-->(E4) spontaneous     Best Motor Response 6-->(M6) obeys commands     Best Verbal Response 5-->(V5) oriented     Diana Coma Scale Score 15                     "

## 2025-04-06 NOTE — ED PROVIDER NOTES
"ED Provider Note  [facility]      History     Chief Complaint   Patient presents with    Rectal Bleeding     Noticed \"a good amount of blood\" in stool this evening, no clots but states there was a large amount on each piece of stool; Said sickle cell doctor told her to come. States she has chronic abd pain, not any worse than normal. Spasming back pain when bearing down to push.     Knee Pain     States she heard her L knee \"crack\" before coming in, about 1-2 hours ago. Is now worried as her entire L leg now is in pain. Unrelated to rectal bleeding.     HPI  Jennifer Cervantes is a 25 year old female PMH of depression, chronic pain, cerebral infarction, hemiparesis, COPD, PE, sickle cell disease, TIA who presents to the ER for evaluation of rectal bleeding and knee pain.  She notes that she had bright red blood in her stool, she had a bowel movement earlier today.  She states that the stool was otherwise brown and fully formed.  She denies any rectal pain.  She had a second bowel movement later in the day and noticed some blood on the paper when she wiped.  She denies any rectal pain or abdominal pain.  No fever/chills.  She has not had any blood since.  No history of bleeding dyscrasias.  Her menstrual cycles have been normal.  Denies any lightheadedness or dizziness.  Additionally, while she was on the couch, she noticed some knee pain, when she attempted to get up she heard a loud \"crack\".  Now the pain is rating from her knee up into her left hip.  She does have a history of chronic knee pain, for which she goes to physical therapy.  Denies any recent,.    Pt was seen on 4/3 for chest wall pain. NO DVT on US. D-dimer was elevated but was unable to do CT with contrast due to allergy.   ER Care Plan:    Dilaudid 1 mg IV q1h up to 3 doses  Toradol 30 mg IV x1   Maintenance IV fluids with LR  Other: Zofran 8 mg IV PRN nausea        Physical Exam   BP: 136/82  Pulse: 91  Temp: 98.1  F (36.7 " " C)  Resp: 16  Height: 162.6 cm (5' 4\")  Weight: 68 kg (150 lb)  SpO2: 93 %  Physical Exam  Vitals and nursing note reviewed.   Constitutional:       General: She is not in acute distress.     Appearance: Normal appearance.   HENT:      Head: Normocephalic.      Nose: Nose normal.   Eyes:      Pupils: Pupils are equal, round, and reactive to light.   Cardiovascular:      Rate and Rhythm: Normal rate and regular rhythm.   Pulmonary:      Effort: Pulmonary effort is normal.   Abdominal:      General: There is no distension.   Musculoskeletal:         General: No deformity. Normal range of motion.      Cervical back: Normal range of motion.      Comments: Tenderness palpation diffusely of the knee and left hip.  No obvious deformities.  No overlying skin changes.  No increased warmth.  Range of motion is intact.  Patient is ambulatory.  No ligament laxity of the knee with varus/valgus stress testing or anterior/posterior drawer test.  Neurovascularly intact.   Skin:     General: Skin is warm.   Neurological:      Mental Status: She is alert and oriented to person, place, and time.   Psychiatric:         Mood and Affect: Mood normal.           ED Course, Procedures, & Data        Results for orders placed or performed during the hospital encounter of 04/06/25   XR Pelvis and Hip Left 2 Views     Status: None    Narrative    EXAM: XR PELVIS AND HIP LEFT 2 VIEWS  LOCATION: [facility]  DATE: 4/6/2025    INDICATION: L hip pain, hx sickle cell dz  COMPARISON: None.      Impression    IMPRESSION: There is no evidence of acute fracture. Hip joint spaces are preserved. No radiographic evidence of left hip osteonecrosis. Dysplastic or  hypoplastic morphology the right femoral head and neck.   XR Knee Left 1/2 Views     Status: None    Narrative    EXAM: XR KNEE LEFT 1/2 VIEWS  LOCATION: [facility]  DATE: 4/6/2025    INDICATION: L knee " pain  COMPARISON: None.      Impression    IMPRESSION: Normal joint spaces and alignment. No fracture or joint effusion.    Comprehensive metabolic panel     Status: Abnormal   Result Value Ref Range    Sodium 139 135 - 145 mmol/L    Potassium 4.1 3.4 - 5.3 mmol/L    Carbon Dioxide (CO2) 22 22 - 29 mmol/L    Anion Gap 11 7 - 15 mmol/L    Urea Nitrogen 8.0 6.0 - 20.0 mg/dL    Creatinine 0.50 (L) 0.51 - 0.95 mg/dL    GFR Estimate >90 >60 mL/min/1.73m2    Calcium 8.9 8.8 - 10.4 mg/dL    Chloride 106 98 - 107 mmol/L    Glucose 87 70 - 99 mg/dL    Alkaline Phosphatase 61 40 - 150 U/L    AST 62 (H) 0 - 45 U/L    ALT 28 0 - 50 U/L    Protein Total 7.8 6.4 - 8.3 g/dL    Albumin 4.4 3.5 - 5.2 g/dL    Bilirubin Total 3.2 (H) <=1.2 mg/dL   INR     Status: Abnormal   Result Value Ref Range    INR 1.18 (H) 0.85 - 1.15   Partial thromboplastin time     Status: Normal   Result Value Ref Range    aPTT 27 22 - 38 Seconds   CBC with platelets and differential     Status: Abnormal   Result Value Ref Range    WBC Count 11.9 (H) 4.0 - 11.0 10e3/uL    RBC Count 2.48 (L) 3.80 - 5.20 10e6/uL    Hemoglobin 7.2 (L) 11.7 - 15.7 g/dL    Hematocrit 20.2 (L) 35.0 - 47.0 %    MCV 82 78 - 100 fL    MCH 29.0 26.5 - 33.0 pg    MCHC 35.6 31.5 - 36.5 g/dL    RDW 21.0 (H) 10.0 - 15.0 %    Platelet Count 418 150 - 450 10e3/uL    % Neutrophils 63 %    % Lymphocytes 22 %    % Monocytes 9 %    % Eosinophils 4 %    % Basophils 2 %    % Immature Granulocytes 0 %    NRBCs per 100 WBC 1 (H) <1 /100    Absolute Neutrophils 7.4 1.6 - 8.3 10e3/uL    Absolute Lymphocytes 2.6 0.8 - 5.3 10e3/uL    Absolute Monocytes 1.1 0.0 - 1.3 10e3/uL    Absolute Eosinophils 0.5 0.0 - 0.7 10e3/uL    Absolute Basophils 0.3 (H) 0.0 - 0.2 10e3/uL    Absolute Immature Granulocytes 0.0 <=0.4 10e3/uL    Absolute NRBCs 0.1 10e3/uL   CBC with platelets differential     Status: Abnormal    Narrative    The following orders were created for panel order CBC with platelets  differential.  Procedure                               Abnormality         Status                     ---------                               -----------         ------                     CBC with platelets and ...[[id number]]  Abnormal            Final result                 Please view results for these tests on the individual orders.     Medications   lactated ringers BOLUS 1,000 mL (0 mLs Intravenous Stopped 4/6/25 7466)   HYDROmorphone (PF) (DILAUDID) injection 1 mg (1 mg Intravenous $Given 4/6/25 8887)   ketorolac (TORADOL) injection 15 mg (15 mg Intravenous $Given 4/6/25 5141)   ondansetron (ZOFRAN) injection 8 mg (8 mg Intravenous $Given 4/6/25 6793)     Labs Ordered and Resulted from Time of ED Arrival to Time of ED Departure   COMPREHENSIVE METABOLIC PANEL - Abnormal       Result Value    Sodium 139      Potassium 4.1      Carbon Dioxide (CO2) 22      Anion Gap 11      Urea Nitrogen 8.0      Creatinine 0.50 (*)     GFR Estimate >90      Calcium 8.9      Chloride 106      Glucose 87      Alkaline Phosphatase 61      AST 62 (*)     ALT 28      Protein Total 7.8      Albumin 4.4      Bilirubin Total 3.2 (*)    INR - Abnormal    INR 1.18 (*)    CBC WITH PLATELETS AND DIFFERENTIAL - Abnormal    WBC Count 11.9 (*)     RBC Count 2.48 (*)     Hemoglobin 7.2 (*)     Hematocrit 20.2 (*)     MCV 82      MCH 29.0      MCHC 35.6      RDW 21.0 (*)     Platelet Count 418      % Neutrophils 63      % Lymphocytes 22      % Monocytes 9      % Eosinophils 4      % Basophils 2      % Immature Granulocytes 0      NRBCs per 100 WBC 1 (*)     Absolute Neutrophils 7.4      Absolute Lymphocytes 2.6      Absolute Monocytes 1.1      Absolute Eosinophils 0.5      Absolute Basophils 0.3 (*)     Absolute Immature Granulocytes 0.0      Absolute NRBCs 0.1     PARTIAL THROMBOPLASTIN TIME - Normal    aPTT 27       XR Pelvis and Hip Left 2 Views   Final Result   IMPRESSION: There is no evidence of acute fracture. Hip joint spaces are  preserved. No radiographic evidence of left hip osteonecrosis. Dysplastic or  hypoplastic morphology the right femoral head and neck.      XR Knee Left 1/2 Views   Final Result   IMPRESSION: Normal joint spaces and alignment. No fracture or joint effusion.              Critical care was not performed.     Medical Decision Making  The patient's presentation was of moderate complexity (a chronic illness mild to moderate exacerbation, progression, or side effect of treatment).    The patient's evaluation involved:  ordering and/or review of 3+ test(s) in this encounter (see separate area of note for details)    The patient's management necessitated high risk (a parenteral controlled substance).    Assessment & Plan    Patient presents the ED for evaluation of rectal bleeding, knee pain, and sickle cell pain.  On arrival, she has normal vital signs.  She appears uncomfortable due to her level of pain which is diffuse in nature but particular over her left knee and hip.  Evaluation of the left knee is unremarkable.  No evidence of ligamentous injury.  No swelling or palpable cords to suggest DVT.  Independent review of x-ray negative for acute fracture or dislocation.  Radiology interpretation reassuring as well.  No evidence of avascular necrosis.  Regarding her rectal bleeding, she has not had any additional bleeding while in the ED for 6 hours.  Her hemodynamics are stable.  Her hemoglobin is consistent with baseline.  She denies rectal pain so low suspicion for hemorrhoids/fissure.  Can continue to be worked up in the outpatient setting.  She is planning to have an endoscopy in the next few weeks, would benefit from undergoing colonoscopy as well.  Metabolic panel and CBC are otherwise reassuring.      Patient was treated with pain medications per her sickle cell pain protocol.  On reassessment, she is requesting to leave.  Educated reasons to return to the ED.        I have reviewed the nursing notes. I have  reviewed the findings, diagnosis, plan and need for follow up with the patient.    Discharge Medication List as of 4/6/2025  6:30 AM          Final diagnoses:   Sickle cell pain crisis (H)   Rectal bleeding   Acute pain of left knee       Raul Diaz DO  [facility] EMERGENCY DEPARTMENT  4/6/2025     Raul Diaz DO  04/07/25 0304

## 2025-04-08 ENCOUNTER — PATIENT OUTREACH (OUTPATIENT)
Dept: CARE COORDINATION | Facility: CLINIC | Age: 25
End: 2025-04-08
Payer: MEDICAID

## 2025-04-08 ENCOUNTER — NURSE TRIAGE (OUTPATIENT)
Dept: ONCOLOGY | Facility: CLINIC | Age: 25
End: 2025-04-08
Payer: MEDICAID

## 2025-04-08 ENCOUNTER — INFUSION THERAPY VISIT (OUTPATIENT)
Dept: ONCOLOGY | Facility: CLINIC | Age: 25
End: 2025-04-08
Attending: PEDIATRICS
Payer: MEDICAID

## 2025-04-08 VITALS
TEMPERATURE: 98.5 F | DIASTOLIC BLOOD PRESSURE: 77 MMHG | RESPIRATION RATE: 16 BRPM | SYSTOLIC BLOOD PRESSURE: 119 MMHG | HEART RATE: 83 BPM | OXYGEN SATURATION: 99 %

## 2025-04-08 DIAGNOSIS — G81.10 SPASTIC HEMIPLEGIA, UNSPECIFIED ETIOLOGY, UNSPECIFIED LATERALITY (H): ICD-10-CM

## 2025-04-08 DIAGNOSIS — D57.00 SICKLE CELL PAIN CRISIS (H): Primary | ICD-10-CM

## 2025-04-08 DIAGNOSIS — D57.00 SICKLE CELL DISEASE WITH CRISIS (H): ICD-10-CM

## 2025-04-08 PROCEDURE — 250N000011 HC RX IP 250 OP 636: Mod: JZ | Performed by: PEDIATRICS

## 2025-04-08 PROCEDURE — 258N000003 HC RX IP 258 OP 636: Performed by: PEDIATRICS

## 2025-04-08 PROCEDURE — 250N000013 HC RX MED GY IP 250 OP 250 PS 637: Performed by: PEDIATRICS

## 2025-04-08 PROCEDURE — 96375 TX/PRO/DX INJ NEW DRUG ADDON: CPT

## 2025-04-08 PROCEDURE — 96374 THER/PROPH/DIAG INJ IV PUSH: CPT

## 2025-04-08 PROCEDURE — 96376 TX/PRO/DX INJ SAME DRUG ADON: CPT

## 2025-04-08 PROCEDURE — 96361 HYDRATE IV INFUSION ADD-ON: CPT

## 2025-04-08 RX ORDER — KETOROLAC TROMETHAMINE 30 MG/ML
30 INJECTION, SOLUTION INTRAMUSCULAR; INTRAVENOUS ONCE
Status: CANCELLED
Start: 2025-07-01 | End: 2025-07-01

## 2025-04-08 RX ORDER — HEPARIN SODIUM (PORCINE) LOCK FLUSH IV SOLN 100 UNIT/ML 100 UNIT/ML
5 SOLUTION INTRAVENOUS
Status: CANCELLED | OUTPATIENT
Start: 2025-07-01

## 2025-04-08 RX ORDER — ONDANSETRON 4 MG/1
8 TABLET, FILM COATED ORAL
Status: CANCELLED
Start: 2025-07-01

## 2025-04-08 RX ORDER — OXYCODONE HYDROCHLORIDE 10 MG/1
10 TABLET ORAL EVERY 6 HOURS PRN
Qty: 12 TABLET | Refills: 0 | Status: SHIPPED | OUTPATIENT
Start: 2025-04-08

## 2025-04-08 RX ORDER — ONDANSETRON 4 MG/1
8 TABLET, ORALLY DISINTEGRATING ORAL
Status: DISCONTINUED | OUTPATIENT
Start: 2025-04-08 | End: 2025-04-08 | Stop reason: HOSPADM

## 2025-04-08 RX ORDER — DIPHENHYDRAMINE HCL 25 MG
50 CAPSULE ORAL
Status: COMPLETED | OUTPATIENT
Start: 2025-04-08 | End: 2025-04-08

## 2025-04-08 RX ORDER — HEPARIN SODIUM (PORCINE) LOCK FLUSH IV SOLN 100 UNIT/ML 100 UNIT/ML
5 SOLUTION INTRAVENOUS
Status: DISCONTINUED | OUTPATIENT
Start: 2025-04-08 | End: 2025-04-08 | Stop reason: HOSPADM

## 2025-04-08 RX ORDER — KETOROLAC TROMETHAMINE 30 MG/ML
30 INJECTION, SOLUTION INTRAMUSCULAR; INTRAVENOUS ONCE
Status: COMPLETED | OUTPATIENT
Start: 2025-04-08 | End: 2025-04-08

## 2025-04-08 RX ORDER — HEPARIN SODIUM,PORCINE 10 UNIT/ML
5 VIAL (ML) INTRAVENOUS
Status: CANCELLED | OUTPATIENT
Start: 2025-07-01

## 2025-04-08 RX ORDER — ONDANSETRON 2 MG/ML
8 INJECTION INTRAMUSCULAR; INTRAVENOUS EVERY 6 HOURS PRN
Status: CANCELLED
Start: 2025-07-01

## 2025-04-08 RX ORDER — ONDANSETRON 2 MG/ML
8 INJECTION INTRAMUSCULAR; INTRAVENOUS EVERY 6 HOURS PRN
Status: DISCONTINUED | OUTPATIENT
Start: 2025-04-08 | End: 2025-04-08 | Stop reason: HOSPADM

## 2025-04-08 RX ORDER — DIPHENHYDRAMINE HCL 25 MG
50 CAPSULE ORAL
Status: CANCELLED
Start: 2025-07-01

## 2025-04-08 RX ADMIN — HYDROMORPHONE HYDROCHLORIDE 1 MG: 1 INJECTION, SOLUTION INTRAMUSCULAR; INTRAVENOUS; SUBCUTANEOUS at 12:09

## 2025-04-08 RX ADMIN — SODIUM CHLORIDE, SODIUM LACTATE, POTASSIUM CHLORIDE, AND CALCIUM CHLORIDE 1000 ML: .6; .31; .03; .02 INJECTION, SOLUTION INTRAVENOUS at 10:05

## 2025-04-08 RX ADMIN — DIPHENHYDRAMINE HYDROCHLORIDE 50 MG: 25 CAPSULE ORAL at 10:08

## 2025-04-08 RX ADMIN — HEPARIN 5 ML: 100 SYRINGE at 12:12

## 2025-04-08 RX ADMIN — HYDROMORPHONE HYDROCHLORIDE 1 MG: 1 INJECTION, SOLUTION INTRAMUSCULAR; INTRAVENOUS; SUBCUTANEOUS at 11:07

## 2025-04-08 RX ADMIN — HYDROMORPHONE HYDROCHLORIDE 1 MG: 1 INJECTION, SOLUTION INTRAMUSCULAR; INTRAVENOUS; SUBCUTANEOUS at 10:06

## 2025-04-08 RX ADMIN — KETOROLAC TROMETHAMINE 30 MG: 30 INJECTION, SOLUTION INTRAMUSCULAR; INTRAVENOUS at 10:05

## 2025-04-08 RX ADMIN — ONDANSETRON 8 MG: 2 INJECTION INTRAMUSCULAR; INTRAVENOUS at 10:05

## 2025-04-08 NOTE — TELEPHONE ENCOUNTER
Oncology Nurse Triage - Sickle Cell Pain Crisis:  Situation: Jennifer  calling about Sickle Cell Pain Crisis, requesting to be added on for IV fluids and pain medicine    Background:   Patient's last infusion was 4/6 in ED  Last clinic visit date:3/17/25 with Dr. Duncan  Does patient have active treatment plan? Yes    Assessment of Symptoms:  Onset/Duration of symptoms: 3 day    Is it typical sickle cell pain? Yes  Location: L leg, L arm, chest wall, and back  Character: Sharp and Dull ache  Intensity: 10/10    Any radiation of pain, numbness, tingling, weakness, warmth, swelling, discoloration of arms or legs?No    Fever? No  Chest Pain Present: Yes, has it for a while. On the wall  Shotness of breath: No    Other home therapies tried: HEAT/HEATING PAD and WARM BATH   Last home medication taken and when: yesterday at 10 p.m. took ibuprofen    Any Refills Needed?: Not until Thursday but would like to pend refill for oxycodone 10 mg today. She is taking 1-2 per day, not out of medication, but will be out on Thursday. Pended in separate refill encounter.    Does patient have transportation & length of time to get to clinic: No. If early or mid day can get here herself.    Recommendations:   If you do not hear from the infusion center by 2pm then you will not be able to get in for an infusion today. If symptoms worsen while waiting for call back, and/or you experience fever, chills, SOB, chest pain, cough, n/v, dizziness, numbness, swelling, discoloration of extremities, then seek emergency evaluation in Emergency Department.     Pt voiced understanding.  Added to infusion wait list.    [facility]  0748: Checked with Patricia to make sure pt can come to apt since she had ED visit on 4/6. Patricia approved infusion.  0758: Called Jennifer who confirmed she can come in at that time.  Updated [facility] Charge nurse. Message sent to [facility] to schedule pt in [facility] at 0930.    Message routed to Care  Team.

## 2025-04-08 NOTE — TELEPHONE ENCOUNTER
Narcotic Refill Request  Person Requesting Refill:Jennifer    Medication(s) requested:  oxycodone IR 10 mg tablet  Last fill date and by whom:  Jacob Cogan, MD on 4/2/25  What pain is the medication treating: sickle cell pain  How is the medication being taken?:taking 1-2 tabs per day  Does pt have enough for today? Yes, but she will be out of medication on Thursday.  Is pain being adequately controlled on the current regimen?: Yes  Experiencing any side effects from medication?: No    Date of most recent appointment:  3/17/25 with Dr. Duncan  Any No Show Visits:No  Next appointment:   Not yet scheduled     Reviewed: No access    Routed/Paged provider: Eric Duncan MD

## 2025-04-08 NOTE — PROGRESS NOTES
Ambulatory Care Management- Transportation Support  Specialty SW - [facility]     Data/Intervention:  Patient Name:    Jennifer Cervantes     /Age: 2000 (25 year old)     [facility] team member assisting Specialty SW with transportation request.      Assessment:  CHW/CTA called [employer] to arrange medical appointment transportation in conjunction with patient's insurance. Will Call ride home when patient is ready.  Reference number is [id number]     Taxi company: [employer]    Patient will need to call above taxi company at phone number: [phone] when ready for return ride home.      Plan:  Patient is aware of the transportation plan.  available to assist with any other needs.      Maritza Vick MA

## 2025-04-08 NOTE — TELEPHONE ENCOUNTER
Jennifer is calling to let us know that she needs a ride home at 1245 or 1300.  Be sure to ask which cab company they are going through so she knows which one to call.   Message sent to UDAY parra/Jennifer's request.

## 2025-04-08 NOTE — PROGRESS NOTES
Infusion Nursing Note:  Jennifer Cervantes presents today for IVF and pain meds.    Patient seen by provider today: NO   present during visit today: Not Applicable.    Note: Jennifer comes in for IV fluids and pain meds. She said that her pain is in her chest wall, back, and left leg. At the time of arrival it was a 10/10; at the end of the visit her pain was a 4/10.     She was in the ER over the weekend for bloody stool, Jennifer said that this was improving.     Medications Given  - IV Zofran   -PO Benadryl  -IV Dilaudid x3   -IV Toradol   -LR     Intravenous Access:  Implanted Port- accessed in infusion    Treatment Conditions:  Not Applicable.      Post Infusion Assessment:  Patient tolerated infusion without incident.  Blood return noted pre and post infusion.  Site patent and intact, free from redness, edema or discomfort.  No evidence of extravasations.  Access discontinued per protocol.       Discharge Plan:   Patient declined prescription refills.  Discharge instructions reviewed with: Patient.  Patient and/or family verbalized understanding of discharge instructions and all questions answered.  AVS to patient via [employer]cloud.  Patient will return 4/25/25 for next appointment.   Patient discharged in stable condition accompanied by: self.  Departure Mode: Ambulatory.      Kathia Mak RN

## 2025-04-09 ENCOUNTER — INFUSION THERAPY VISIT (OUTPATIENT)
Dept: ONCOLOGY | Facility: CLINIC | Age: 25
End: 2025-04-09
Attending: REGISTERED NURSE
Payer: MEDICAID

## 2025-04-09 ENCOUNTER — NURSE TRIAGE (OUTPATIENT)
Dept: ONCOLOGY | Facility: CLINIC | Age: 25
End: 2025-04-09
Payer: MEDICAID

## 2025-04-09 VITALS
TEMPERATURE: 98.2 F | RESPIRATION RATE: 18 BRPM | SYSTOLIC BLOOD PRESSURE: 122 MMHG | DIASTOLIC BLOOD PRESSURE: 79 MMHG | HEART RATE: 86 BPM | OXYGEN SATURATION: 93 %

## 2025-04-09 DIAGNOSIS — G81.10 SPASTIC HEMIPLEGIA, UNSPECIFIED ETIOLOGY, UNSPECIFIED LATERALITY (H): ICD-10-CM

## 2025-04-09 DIAGNOSIS — D57.1 HB-SS DISEASE WITHOUT CRISIS (H): ICD-10-CM

## 2025-04-09 DIAGNOSIS — D57.00 SICKLE CELL PAIN CRISIS (H): Primary | ICD-10-CM

## 2025-04-09 DIAGNOSIS — D57.1 HB-SS DISEASE WITHOUT CRISIS (H): Primary | ICD-10-CM

## 2025-04-09 LAB
BASOPHILS # BLD AUTO: 0.3 10E3/UL (ref 0–0.2)
BASOPHILS NFR BLD AUTO: 3 %
EOSINOPHIL # BLD AUTO: 0.5 10E3/UL (ref 0–0.7)
EOSINOPHIL NFR BLD AUTO: 5 %
ERYTHROCYTE [DISTWIDTH] IN BLOOD BY AUTOMATED COUNT: 23.7 % (ref 10–15)
HCT VFR BLD AUTO: 20.3 % (ref 35–47)
HGB BLD-MCNC: 6.9 G/DL (ref 11.7–15.7)
IMM GRANULOCYTES # BLD: 0 10E3/UL
IMM GRANULOCYTES NFR BLD: 0 %
LYMPHOCYTES # BLD AUTO: 1.7 10E3/UL (ref 0.8–5.3)
LYMPHOCYTES NFR BLD AUTO: 16 %
MCH RBC QN AUTO: 28.9 PG (ref 26.5–33)
MCHC RBC AUTO-ENTMCNC: 34 G/DL (ref 31.5–36.5)
MCV RBC AUTO: 85 FL (ref 78–100)
MONOCYTES # BLD AUTO: 0.8 10E3/UL (ref 0–1.3)
MONOCYTES NFR BLD AUTO: 7 %
NEUTROPHILS # BLD AUTO: 7.4 10E3/UL (ref 1.6–8.3)
NEUTROPHILS NFR BLD AUTO: 69 %
NRBC # BLD AUTO: 0.3 10E3/UL
NRBC BLD AUTO-RTO: 2 /100
PLATELET # BLD AUTO: 461 10E3/UL (ref 150–450)
RBC # BLD AUTO: 2.39 10E6/UL (ref 3.8–5.2)
WBC # BLD AUTO: 10.7 10E3/UL (ref 4–11)

## 2025-04-09 PROCEDURE — 96374 THER/PROPH/DIAG INJ IV PUSH: CPT

## 2025-04-09 PROCEDURE — 258N000003 HC RX IP 258 OP 636: Performed by: PEDIATRICS

## 2025-04-09 PROCEDURE — 85014 HEMATOCRIT: CPT | Performed by: REGISTERED NURSE

## 2025-04-09 PROCEDURE — 36415 COLL VENOUS BLD VENIPUNCTURE: CPT | Performed by: REGISTERED NURSE

## 2025-04-09 PROCEDURE — 96361 HYDRATE IV INFUSION ADD-ON: CPT

## 2025-04-09 PROCEDURE — 85004 AUTOMATED DIFF WBC COUNT: CPT | Performed by: REGISTERED NURSE

## 2025-04-09 PROCEDURE — 96376 TX/PRO/DX INJ SAME DRUG ADON: CPT

## 2025-04-09 PROCEDURE — 250N000011 HC RX IP 250 OP 636: Mod: JZ | Performed by: PEDIATRICS

## 2025-04-09 PROCEDURE — 96375 TX/PRO/DX INJ NEW DRUG ADDON: CPT

## 2025-04-09 PROCEDURE — 250N000013 HC RX MED GY IP 250 OP 250 PS 637: Performed by: PEDIATRICS

## 2025-04-09 RX ORDER — HEPARIN SODIUM (PORCINE) LOCK FLUSH IV SOLN 100 UNIT/ML 100 UNIT/ML
5 SOLUTION INTRAVENOUS
Status: DISCONTINUED | OUTPATIENT
Start: 2025-04-09 | End: 2025-04-09 | Stop reason: HOSPADM

## 2025-04-09 RX ORDER — ONDANSETRON 2 MG/ML
8 INJECTION INTRAMUSCULAR; INTRAVENOUS EVERY 6 HOURS PRN
Start: 2025-07-01

## 2025-04-09 RX ORDER — DIPHENHYDRAMINE HCL 25 MG
50 CAPSULE ORAL
Status: COMPLETED | OUTPATIENT
Start: 2025-04-09 | End: 2025-04-09

## 2025-04-09 RX ORDER — DIPHENHYDRAMINE HCL 25 MG
50 CAPSULE ORAL
Start: 2025-07-01

## 2025-04-09 RX ORDER — ONDANSETRON 2 MG/ML
8 INJECTION INTRAMUSCULAR; INTRAVENOUS EVERY 6 HOURS PRN
Status: DISCONTINUED | OUTPATIENT
Start: 2025-04-09 | End: 2025-04-09 | Stop reason: HOSPADM

## 2025-04-09 RX ORDER — HEPARIN SODIUM (PORCINE) LOCK FLUSH IV SOLN 100 UNIT/ML 100 UNIT/ML
5 SOLUTION INTRAVENOUS
OUTPATIENT
Start: 2025-07-01

## 2025-04-09 RX ORDER — HEPARIN SODIUM,PORCINE 10 UNIT/ML
5 VIAL (ML) INTRAVENOUS
OUTPATIENT
Start: 2025-07-01

## 2025-04-09 RX ORDER — ONDANSETRON 4 MG/1
8 TABLET, FILM COATED ORAL
Start: 2025-07-01

## 2025-04-09 RX ORDER — KETOROLAC TROMETHAMINE 30 MG/ML
30 INJECTION, SOLUTION INTRAMUSCULAR; INTRAVENOUS ONCE
Start: 2025-07-01 | End: 2025-07-01

## 2025-04-09 RX ORDER — KETOROLAC TROMETHAMINE 30 MG/ML
30 INJECTION, SOLUTION INTRAMUSCULAR; INTRAVENOUS ONCE
Status: COMPLETED | OUTPATIENT
Start: 2025-04-09 | End: 2025-04-09

## 2025-04-09 RX ADMIN — KETOROLAC TROMETHAMINE 30 MG: 30 INJECTION, SOLUTION INTRAMUSCULAR; INTRAVENOUS at 13:28

## 2025-04-09 RX ADMIN — HYDROMORPHONE HYDROCHLORIDE 1 MG: 1 INJECTION, SOLUTION INTRAMUSCULAR; INTRAVENOUS; SUBCUTANEOUS at 14:59

## 2025-04-09 RX ADMIN — DIPHENHYDRAMINE HYDROCHLORIDE 50 MG: 25 CAPSULE ORAL at 12:59

## 2025-04-09 RX ADMIN — SODIUM CHLORIDE, SODIUM LACTATE, POTASSIUM CHLORIDE, CALCIUM CHLORIDE 1000 ML: 600; 310; 30; 20 INJECTION, SOLUTION INTRAVENOUS at 13:00

## 2025-04-09 RX ADMIN — HEPARIN 5 ML: 100 SYRINGE at 15:24

## 2025-04-09 RX ADMIN — HYDROMORPHONE HYDROCHLORIDE 1 MG: 1 INJECTION, SOLUTION INTRAMUSCULAR; INTRAVENOUS; SUBCUTANEOUS at 13:58

## 2025-04-09 RX ADMIN — HYDROMORPHONE HYDROCHLORIDE 1 MG: 1 INJECTION, SOLUTION INTRAMUSCULAR; INTRAVENOUS; SUBCUTANEOUS at 12:56

## 2025-04-09 RX ADMIN — ONDANSETRON 8 MG: 2 INJECTION INTRAMUSCULAR; INTRAVENOUS at 12:57

## 2025-04-09 ASSESSMENT — PAIN SCALES - GENERAL: PAINLEVEL_OUTOF10: SEVERE PAIN (7)

## 2025-04-09 NOTE — PROGRESS NOTES
Infusion Nursing Note:  Jennifer Cervantes presents today for IV Fluids/Pain meds.    Patient seen by provider today: No   present during visit today: Not Applicable.    Note: Patient presents today with generalized pain rated as 7/10. Denies any nausea, but requests antiemetics because Dilaudid makes her nauseous. Patient reported feeling like her hemoglobin was low. Otherwise denies any recent fevers/chill or other symptoms of illness. She reported ongoing abdominal and chest wall pain which radiates to her back. CXR on 4/3 negative for acute changes. EKG on 4/3 NSR. Patient rated pain as 4/10 prior to dismissal.     /79 (BP Location: Left arm, Patient Position: Sitting, Cuff Size: Adult Regular)   Pulse 86   Temp 98.2  F (36.8  C) (Oral)   Resp 18   SpO2 93%     Written communication 4/9/25 @1310 Patricia Mantilla CNP/Mariah Saenz RN - patient reported feeling like hemoglobin was low - OK to draw CBC.     Written communication 4/9/25 @1400 Patricia Mantilla CNP/Mariah Saenz RN - Hgb 6.9. Please add her on to my schedule at 12PM 4/11. Lab appointment prior to draw new labs. IB sent to /Patricia Mantilla CNP with scheduling request.     Patient received:  - 1 L LR  - Dilaudid x3  - Toradol  - Zofran  - Benadryl    Intravenous Access:  Implanted Port accessed by Infusion RN.     Post Infusion Assessment:  Patient tolerated infusion without incident.  Blood return noted pre and post infusion.  Site patent and intact, free from redness, edema or discomfort.  Access discontinued per protocol.     Discharge Plan:   Prescription refills given for Oxycodone.  Discharge instructions reviewed with: Patient.  Patient and/or family verbalized understanding of discharge instructions and all questions answered.  AVS to patient via [employer].  Patient will return 4/29 for next appointment.   Patient discharged in stable condition accompanied by: self.  Departure Mode: Ambulatory.    Mariah Saenz RN

## 2025-04-09 NOTE — TELEPHONE ENCOUNTER
Oncology Nurse Triage - Sickle Cell Pain Crisis:    Situation: Jennifer  calling about Sickle Cell Pain Crisis, requesting to be added on for IV fluids and pain medicine    Background:   Patient's last infusion was 4/8/25  Last clinic visit date:3/17/25 w/ Dr. Duncan  Does patient have active treatment plan? Yes    Assessment of Symptoms:  Onset/Duration of symptoms: 4 days    Is it typical sickle cell pain? Yes  Location: chest wall, back, left arm, left leg  Character: Sharp and Dull ache  Intensity: 7/10    Any radiation of pain, numbness, tingling, weakness, warmth, swelling, discoloration of arms or legs?No    Fever? No    Chest Pain Present: Yes- has been going on for a while in the wall of chest, does not feel it is heart related yet.     Shortness of breath: No    Other home therapies tried: HEAT/HEATING PAD and WARM BATH     Last home medication taken and when: yesterday    Any Refills Needed?: No    Does patient have transportation & length of time to get to clinic: Yes- 20 minutes    Recommendations:   Added to infusion wait list. Per therapy plan, can schedule 3 consecutive days without contacting provider.     1112 call to pt to offer 1300 infusion appt in [facility] today. Pt accepting of time. Writer asked if pt needed transportation home set up? Pt states she is going to try and arrange her own transportation home, will call if she is unable to do it herself.   1113 request sent to scheduling.

## 2025-04-09 NOTE — PATIENT INSTRUCTIONS
Contact Numbers    [facility] Main Line (for Scheduling/Triage/After Hours Nurse Line): [phone]    Please call the [facility] nurse triage or the after hours nurse line if you experience a temperature greater than or equal to 100.4, shaking chills, have uncontrolled nausea, vomiting and/or diarrhea, dizziness, lightheadedness, shortness of breath, chest pain, bleeding, unexplained bruising, or if you have any other new/concerning symptoms, questions or concerns.     If you are having any concerning symptoms or wish to speak to a provider before your next infusion visit, please call your care coordinator or triage to notify them so we can adequately serve you.     If you need any refills on medications (narcotics or other medications), please call before your infusion appointment.

## 2025-04-10 ENCOUNTER — VIRTUAL VISIT (OUTPATIENT)
Dept: PSYCHOLOGY | Facility: CLINIC | Age: 25
End: 2025-04-10
Payer: MEDICAID

## 2025-04-10 DIAGNOSIS — F54 PSYCHOLOGICAL AND BEHAVIORAL FACTORS ASSOCIATED WITH DISORDERS OR DISEASES CLASSIFIED ELSEWHERE: ICD-10-CM

## 2025-04-10 DIAGNOSIS — F34.1 PERSISTENT DEPRESSIVE DISORDER: Primary | ICD-10-CM

## 2025-04-10 DIAGNOSIS — G89.4 CHRONIC PAIN ASSOCIATED WITH SIGNIFICANT PSYCHOSOCIAL DYSFUNCTION: ICD-10-CM

## 2025-04-10 DIAGNOSIS — D57.1 HB-SS DISEASE WITHOUT CRISIS (H): ICD-10-CM

## 2025-04-10 PROCEDURE — 90837 PSYTX W PT 60 MINUTES: CPT | Mod: 95 | Performed by: PSYCHOLOGIST

## 2025-04-11 ENCOUNTER — APPOINTMENT (OUTPATIENT)
Dept: LAB | Facility: CLINIC | Age: 25
End: 2025-04-11
Attending: PEDIATRICS
Payer: MEDICAID

## 2025-04-11 PROCEDURE — 86900 BLOOD TYPING SEROLOGIC ABO: CPT | Performed by: REGISTERED NURSE

## 2025-04-11 PROCEDURE — 36591 DRAW BLOOD OFF VENOUS DEVICE: CPT

## 2025-04-13 NOTE — CONFIDENTIAL NOTE
"Health Psychology - Follow up Visit  Confidential Summary*     The author of this note documented a reason for not sharing it with the patient.     REFERRAL SOURCE:  PCP     CHIEF COMPLAINT/REASON FOR VISIT  Cognitive behavioral therapy and behavioral counseling in context of chronic pain associated with sickle cell disease with recurrent hospitalizations and asthma and emotion dysregulation with current depressed mood.       Patient seen for 60 minute psychotherapy session.  Patient was at her home and provider was at her home.  Session provided via [employer].      Patient has agreed to receiving telehealth services.      The patient has been notified of following:   \"This video visit was conducted via video call between you and your physician/provider. We have found that certain health care needs can be provided without the need for an in-person physical exam. Video visits may be billed at different rates depending on your insurance coverage.  Please reach out to your insurance provider with any questions. If during the course of the call the physician/provider feels a video visit is not appropriate, you will not be charged for this service.\"     Patient has given verbal consent for telephone/Video visit? Yes       Subjective:  Patient began with review of our last visit which was the first time we met.  She described concern that in the past things were written in her chart that may not have been true of her and that she was concerned that I was both discrete and accurate in my description in my documentation.  We discussed the relationship what she has with her outside the system therapist and that she believes that she feels more comfortable with these communications because she is not documenting their appointments into her medical chart.  Discussed limits to my documentation given that I am employed by the same system where she receives her primary medical care.  Also reviewed the benefits of this relationship " "and suggested that I might assist her in communication with her care team.      She reported that she has been received infusion treatments twice per week each week and that she believes that infusions were prescribed to discontinue her trips to the ED in sickle cell crises.  She explained that her medication is short acting and that she does receive pain relief from these but that the medication delivered via infusion has improved pain management.  She described some negative effects of infusion treatment which include nausea and vomiting (takes zofran) and that she is very sleepy both the day of infusion and the following day.      She described a recent interaction with her rheumatologist and has some remaining concerns as to whether he views her as an \"addict\".  Discussed her chronic pain condition and the challenges inherent in navigating biologic tolerance with pain management.      She reported that she is a \"homebody\" and that she prefers time in her room but that she will leave the house to go on outings with her mother and to visit a friends home.  She described challenges with her mental health and that isolation helps her to remain more emotionally regulated (\"calm\").  She would like our work together to assist her in better understanding her emotions.  We discussed what a \";mary worth living\" might look like.    Objective:  Patient was on time for today s session, appropriately groomed and dressed.  Of note, after initial greeting, patient chose to keep self out of video with lights off.  She appeared friendly, alert and oriented. Mood was euthymic, with appropriate range of affect. Patient denied suicidal or assaultive ideation, plan, or intent.        Assessment:  The patient is coping with sickle cell disease and perception of racism associated with accusations that she might be misusing pain medications.  She also described challenges with regulating her emotions and having recurrent interpersonal " "discord.  She also described chronic depressive symptoms and that she \"cries daily\".  She has an outside therapist and would like our sessions to focus on increasing her awareness of emotional health.     Plan:  See in 1 week, encouraged engaging in weekly therapy.       Time In:  10:00  Time Out: 11:00     Diagnosis:  Axis I Persistent depressive disorder vs recurrent MDD, current episode moderate; chronic pain; psychological factors associated with medical condition   Axis II Deferred   Axis III please see medical records for details   [place] IV Psychosocial and Environmental Stressors:Health and racial disparities         TREATMENT PLAN:  next session     Shandra Caruso, PhD, LP        *In accordance with the Rules of the [place] Board of [employer], it is noted that psychological descriptions and scientific procedures underlying psychological evaluations have limitations.  Absolute predictions cannot be made based on information in this report.             "

## 2025-04-14 ENCOUNTER — NURSE TRIAGE (OUTPATIENT)
Dept: ONCOLOGY | Facility: CLINIC | Age: 25
End: 2025-04-14
Payer: MEDICAID

## 2025-04-14 ENCOUNTER — INFUSION THERAPY VISIT (OUTPATIENT)
Dept: INFUSION THERAPY | Facility: CLINIC | Age: 25
End: 2025-04-14
Attending: PEDIATRICS
Payer: MEDICAID

## 2025-04-14 ENCOUNTER — PATIENT OUTREACH (OUTPATIENT)
Dept: CARE COORDINATION | Facility: CLINIC | Age: 25
End: 2025-04-14
Payer: MEDICAID

## 2025-04-14 VITALS
HEART RATE: 78 BPM | SYSTOLIC BLOOD PRESSURE: 129 MMHG | OXYGEN SATURATION: 100 % | TEMPERATURE: 98.4 F | DIASTOLIC BLOOD PRESSURE: 77 MMHG | RESPIRATION RATE: 18 BRPM

## 2025-04-14 DIAGNOSIS — G81.10 SPASTIC HEMIPLEGIA, UNSPECIFIED ETIOLOGY, UNSPECIFIED LATERALITY (H): ICD-10-CM

## 2025-04-14 DIAGNOSIS — D57.00 SICKLE CELL PAIN CRISIS (H): Primary | ICD-10-CM

## 2025-04-14 PROCEDURE — 258N000003 HC RX IP 258 OP 636: Performed by: PEDIATRICS

## 2025-04-14 PROCEDURE — 250N000013 HC RX MED GY IP 250 OP 250 PS 637: Performed by: PEDIATRICS

## 2025-04-14 PROCEDURE — 96376 TX/PRO/DX INJ SAME DRUG ADON: CPT

## 2025-04-14 PROCEDURE — 96361 HYDRATE IV INFUSION ADD-ON: CPT

## 2025-04-14 PROCEDURE — 96374 THER/PROPH/DIAG INJ IV PUSH: CPT

## 2025-04-14 PROCEDURE — 96375 TX/PRO/DX INJ NEW DRUG ADDON: CPT

## 2025-04-14 PROCEDURE — 250N000011 HC RX IP 250 OP 636: Performed by: PEDIATRICS

## 2025-04-14 RX ORDER — KETOROLAC TROMETHAMINE 30 MG/ML
30 INJECTION, SOLUTION INTRAMUSCULAR; INTRAVENOUS ONCE
Status: COMPLETED | OUTPATIENT
Start: 2025-04-14 | End: 2025-04-14

## 2025-04-14 RX ORDER — DIPHENHYDRAMINE HCL 25 MG
50 CAPSULE ORAL
Status: CANCELLED
Start: 2025-07-01

## 2025-04-14 RX ORDER — KETOROLAC TROMETHAMINE 30 MG/ML
30 INJECTION, SOLUTION INTRAMUSCULAR; INTRAVENOUS ONCE
Status: CANCELLED
Start: 2025-07-01 | End: 2025-07-01

## 2025-04-14 RX ORDER — ONDANSETRON 2 MG/ML
8 INJECTION INTRAMUSCULAR; INTRAVENOUS EVERY 6 HOURS PRN
Status: CANCELLED
Start: 2025-07-01

## 2025-04-14 RX ORDER — DIPHENHYDRAMINE HCL 25 MG
50 CAPSULE ORAL
Status: COMPLETED | OUTPATIENT
Start: 2025-04-14 | End: 2025-04-14

## 2025-04-14 RX ORDER — HEPARIN SODIUM,PORCINE 10 UNIT/ML
5 VIAL (ML) INTRAVENOUS
Status: CANCELLED | OUTPATIENT
Start: 2025-07-01

## 2025-04-14 RX ORDER — ONDANSETRON 4 MG/1
8 TABLET, FILM COATED ORAL
Status: CANCELLED
Start: 2025-07-01

## 2025-04-14 RX ORDER — ONDANSETRON 2 MG/ML
8 INJECTION INTRAMUSCULAR; INTRAVENOUS EVERY 6 HOURS PRN
Status: DISCONTINUED | OUTPATIENT
Start: 2025-04-14 | End: 2025-04-14 | Stop reason: HOSPADM

## 2025-04-14 RX ORDER — HEPARIN SODIUM (PORCINE) LOCK FLUSH IV SOLN 100 UNIT/ML 100 UNIT/ML
5 SOLUTION INTRAVENOUS
Status: DISCONTINUED | OUTPATIENT
Start: 2025-04-14 | End: 2025-04-14 | Stop reason: HOSPADM

## 2025-04-14 RX ORDER — HEPARIN SODIUM (PORCINE) LOCK FLUSH IV SOLN 100 UNIT/ML 100 UNIT/ML
5 SOLUTION INTRAVENOUS
Status: CANCELLED | OUTPATIENT
Start: 2025-07-01

## 2025-04-14 RX ADMIN — HYDROMORPHONE HYDROCHLORIDE 1 MG: 1 INJECTION, SOLUTION INTRAMUSCULAR; INTRAVENOUS; SUBCUTANEOUS at 12:37

## 2025-04-14 RX ADMIN — KETOROLAC TROMETHAMINE 30 MG: 30 INJECTION, SOLUTION INTRAMUSCULAR; INTRAVENOUS at 12:37

## 2025-04-14 RX ADMIN — HYDROMORPHONE HYDROCHLORIDE 1 MG: 1 INJECTION, SOLUTION INTRAMUSCULAR; INTRAVENOUS; SUBCUTANEOUS at 13:36

## 2025-04-14 RX ADMIN — Medication 5 ML: at 15:30

## 2025-04-14 RX ADMIN — DIPHENHYDRAMINE HYDROCHLORIDE 50 MG: 25 CAPSULE ORAL at 12:36

## 2025-04-14 RX ADMIN — SODIUM CHLORIDE, POTASSIUM CHLORIDE, SODIUM LACTATE AND CALCIUM CHLORIDE 1000 ML: 600; 310; 30; 20 INJECTION, SOLUTION INTRAVENOUS at 12:34

## 2025-04-14 RX ADMIN — ONDANSETRON 8 MG: 2 INJECTION INTRAMUSCULAR; INTRAVENOUS at 12:37

## 2025-04-14 RX ADMIN — HYDROMORPHONE HYDROCHLORIDE 1 MG: 1 INJECTION, SOLUTION INTRAMUSCULAR; INTRAVENOUS; SUBCUTANEOUS at 14:34

## 2025-04-14 NOTE — PROGRESS NOTES
Ambulatory Care Management- Transportation Support  Specialty SW - Psychiatric     Data/Intervention:  Patient Name:    Jennifer Cervantes     /Age: 2000 (25 year old)     CCRC team member assisting Specialty SW with transportation request.      Assessment:  CHW/CTA booked online with [employer] to arrange medical appointment transportation in conjunction with patient's insurance. Your reference number is [id number]_Return Booking # is [id number]     Taxi company: [employer]    Patient will need to call above taxi company at phone number: [phone] when ready for return ride home.      Plan:  Patient is aware of the transportation plan.  available to assist with any other needs.      Maritza Vick MA

## 2025-04-14 NOTE — PROGRESS NOTES
Infusion Nursing Note:  Jennifer Cervantes presents today for IVF and pain meds.    Patient seen by provider today: No   present during visit today: Not Applicable.    Note: Pt c/o 8/10 chest wall pain. Received 1L LR, 1mg x3 doses IV dilaudid, 8mg IV zofran, 30mg IV toradol, 50mg PO benadryl. Pain improved to tolerable level on discharge.      Intravenous Access:  Implanted Port.    Treatment Conditions:  Not Applicable.      Post Infusion Assessment:  Patient tolerated infusion without incident.  Blood return noted pre and post infusion.  Site patent and intact, free from redness, edema or discomfort.  No evidence of extravasations.  Access discontinued per protocol.       Discharge Plan:   Discharge instructions reviewed with: Patient.  Patient and/or family verbalized understanding of discharge instructions and all questions answered.  AVS to patient via [email or web address].  Patient will return PRN for next appointment.   Patient discharged in stable condition accompanied by: self.  Departure Mode: Ambulatory.      Allison Bowman RN

## 2025-04-14 NOTE — TELEPHONE ENCOUNTER
Oncology Nurse Triage - Sickle Cell Pain Crisis:    Situation: Jennifer  calling about Sickle Cell Pain Crisis, requesting to be added on for IV fluids and pain medicine    Background:     Patient's last infusion was 4/11/2025  Last clinic visit date:4/11/2025 Patricia Mantilla CNP  Does patient have active treatment plan? Yes    Assessment of Symptoms:  Onset/Duration of symptoms: 1 day    Is it typical sickle cell pain? Yes  Location: side of body and front, chest wall (typical)   Character: Sharp and Dull ache  Intensity: 7/10    Any radiation of pain, numbness, tingling, weakness, warmth, swelling, discoloration of arms or legs?No    Fever? No    Chest Pain Present: No    Shortness of breath: No    Other home therapies tried: HEAT/HEATING PAD and WARM BATH     Last home medication taken and when: oxycodone at 10pm yesterday    Any Refills Needed?: No    Does patient have transportation & length of time to get to clinic: Yes might need a ride home      Recommendations:   Meets protocol     0931 Offer for appt today [facility] offer for 12:30pm for IVF/Pain    0935 message sent to  to assist with transportation.     0956  Vocera page sent to Ally Shaffer MSW  [phone]  aware and working on transportation needs for pt.     Please note, if you are late for your appt, you risk losing your infusion appt as it may delay another patient's infusion who arrived on time.

## 2025-04-16 ENCOUNTER — NURSE TRIAGE (OUTPATIENT)
Dept: ONCOLOGY | Facility: CLINIC | Age: 25
End: 2025-04-16

## 2025-04-16 ENCOUNTER — INFUSION THERAPY VISIT (OUTPATIENT)
Dept: ONCOLOGY | Facility: CLINIC | Age: 25
End: 2025-04-16
Attending: PEDIATRICS
Payer: MEDICAID

## 2025-04-16 VITALS
TEMPERATURE: 97.6 F | SYSTOLIC BLOOD PRESSURE: 113 MMHG | DIASTOLIC BLOOD PRESSURE: 66 MMHG | RESPIRATION RATE: 16 BRPM | OXYGEN SATURATION: 93 % | HEART RATE: 97 BPM

## 2025-04-16 DIAGNOSIS — D57.00 SICKLE CELL PAIN CRISIS (H): Primary | ICD-10-CM

## 2025-04-16 DIAGNOSIS — G81.10 SPASTIC HEMIPLEGIA, UNSPECIFIED ETIOLOGY, UNSPECIFIED LATERALITY (H): ICD-10-CM

## 2025-04-16 DIAGNOSIS — D57.00 SICKLE CELL DISEASE WITH CRISIS (H): ICD-10-CM

## 2025-04-16 PROCEDURE — 250N000013 HC RX MED GY IP 250 OP 250 PS 637: Performed by: PEDIATRICS

## 2025-04-16 PROCEDURE — 258N000003 HC RX IP 258 OP 636: Performed by: PEDIATRICS

## 2025-04-16 PROCEDURE — 250N000011 HC RX IP 250 OP 636: Performed by: PEDIATRICS

## 2025-04-16 RX ORDER — OXYCODONE HYDROCHLORIDE 10 MG/1
10 TABLET ORAL EVERY 6 HOURS PRN
Qty: 12 TABLET | Refills: 0 | Status: SHIPPED | OUTPATIENT
Start: 2025-04-16

## 2025-04-16 RX ORDER — DIPHENHYDRAMINE HCL 25 MG
50 CAPSULE ORAL
Start: 2025-07-01

## 2025-04-16 RX ORDER — HEPARIN SODIUM (PORCINE) LOCK FLUSH IV SOLN 100 UNIT/ML 100 UNIT/ML
5 SOLUTION INTRAVENOUS
OUTPATIENT
Start: 2025-07-01

## 2025-04-16 RX ORDER — KETOROLAC TROMETHAMINE 30 MG/ML
30 INJECTION, SOLUTION INTRAMUSCULAR; INTRAVENOUS ONCE
Status: COMPLETED | OUTPATIENT
Start: 2025-04-16 | End: 2025-04-16

## 2025-04-16 RX ORDER — ONDANSETRON 4 MG/1
8 TABLET, FILM COATED ORAL
Start: 2025-07-01

## 2025-04-16 RX ORDER — DIPHENHYDRAMINE HCL 25 MG
50 CAPSULE ORAL
Status: COMPLETED | OUTPATIENT
Start: 2025-04-16 | End: 2025-04-16

## 2025-04-16 RX ORDER — ONDANSETRON 2 MG/ML
8 INJECTION INTRAMUSCULAR; INTRAVENOUS EVERY 6 HOURS PRN
Start: 2025-07-01

## 2025-04-16 RX ORDER — ONDANSETRON 2 MG/ML
8 INJECTION INTRAMUSCULAR; INTRAVENOUS EVERY 6 HOURS PRN
Status: DISCONTINUED | OUTPATIENT
Start: 2025-04-16 | End: 2025-04-16 | Stop reason: HOSPADM

## 2025-04-16 RX ORDER — KETOROLAC TROMETHAMINE 30 MG/ML
30 INJECTION, SOLUTION INTRAMUSCULAR; INTRAVENOUS ONCE
Start: 2025-07-01 | End: 2025-07-01

## 2025-04-16 RX ORDER — HEPARIN SODIUM (PORCINE) LOCK FLUSH IV SOLN 100 UNIT/ML 100 UNIT/ML
5 SOLUTION INTRAVENOUS
Status: DISCONTINUED | OUTPATIENT
Start: 2025-04-16 | End: 2025-04-16 | Stop reason: HOSPADM

## 2025-04-16 RX ORDER — HEPARIN SODIUM,PORCINE 10 UNIT/ML
5 VIAL (ML) INTRAVENOUS
OUTPATIENT
Start: 2025-07-01

## 2025-04-16 RX ADMIN — HYDROMORPHONE HYDROCHLORIDE 1 MG: 1 INJECTION, SOLUTION INTRAMUSCULAR; INTRAVENOUS; SUBCUTANEOUS at 09:36

## 2025-04-16 RX ADMIN — KETOROLAC TROMETHAMINE 30 MG: 30 INJECTION, SOLUTION INTRAMUSCULAR; INTRAVENOUS at 09:39

## 2025-04-16 RX ADMIN — SODIUM CHLORIDE, POTASSIUM CHLORIDE, SODIUM LACTATE AND CALCIUM CHLORIDE 1000 ML: 600; 310; 30; 20 INJECTION, SOLUTION INTRAVENOUS at 09:23

## 2025-04-16 RX ADMIN — HEPARIN 5 ML: 100 SYRINGE at 12:04

## 2025-04-16 RX ADMIN — DIPHENHYDRAMINE HYDROCHLORIDE 50 MG: 25 CAPSULE ORAL at 09:38

## 2025-04-16 RX ADMIN — HYDROMORPHONE HYDROCHLORIDE 1 MG: 1 INJECTION, SOLUTION INTRAMUSCULAR; INTRAVENOUS; SUBCUTANEOUS at 11:37

## 2025-04-16 RX ADMIN — HYDROMORPHONE HYDROCHLORIDE 1 MG: 1 INJECTION, SOLUTION INTRAMUSCULAR; INTRAVENOUS; SUBCUTANEOUS at 10:37

## 2025-04-16 RX ADMIN — ONDANSETRON 8 MG: 2 INJECTION INTRAMUSCULAR; INTRAVENOUS at 09:32

## 2025-04-16 NOTE — TELEPHONE ENCOUNTER
Oncology Nurse Triage - Sickle Cell Pain Crisis:  Situation: Jennifer  calling about Sickle Cell Pain Crisis, requesting to be added on for IV fluids and pain medicine    Background:   Patient's last infusion was 4/14/25  Last clinic visit date:4/11/25 with Patricia Mantilla CNP  Does patient have active treatment plan? Yes    Assessment of Symptoms:  Onset/Duration of symptoms: 1 day    Is it typical sickle cell pain? Yes  Location: lower back  Character: Dull ache  Intensity: 7/10    Any radiation of pain, numbness, tingling, weakness, warmth, swelling, discoloration of arms or legs?No    Fever? No  Chest Pain Present: No  Shortness of breath: No    Other home therapies tried: HEAT/HEATING PAD and WARM BATH   Last home medication taken and when: yesterday at 2000 took oxycodone    Any Refills Needed?: Yes oxycodone, as needed. Needs tomorrow.    Does patient have transportation & length of time to get to clinic: Yes, 10-15 min    Recommendations:   If you do not hear from the infusion center by 2pm then you will not be able to get in for an infusion today. If symptoms worsen while waiting for call back, and/or you experience fever, chills, SOB, chest pain, cough, n/v, dizziness, numbness, swelling, discoloration of extremities, then seek emergency evaluation in Emergency Department.     Pt voiced understanding.    Added to infusion wait list.    0814: [facility] can offer apt asap.    0820: Called Jennifer and she confirmed she can be here in 10-15 minutes.  Updated infusion Charge Nurse.  Message sent to CCOD to schedule.

## 2025-04-16 NOTE — PROGRESS NOTES
Infusion Nursing Note:  Jennifer Cervantes presents today for IVF and pain meds.    Patient seen by provider today: No   present during visit today: Not Applicable.    Note: Pt assessed upon arrival to infusion suite. Denies fever, chills, cough, SOB or other signs/symptoms of infection. Pt rating pain 7/10 throughout her whole left side upon arrival to infusion suite. After 3 doses of Dilaudid and one dose of Toradol, pain decreased to 5/10, which patient states is tolerable.     Intravenous Access:  Implanted Port.    Post Infusion Assessment:  Patient tolerated infusion without incident.  Blood return noted pre and post infusion.  Site patent and intact, free from redness, edema or discomfort.  No evidence of extravasations.  Access discontinued per protocol.       Discharge Plan:   Prescription refills given for Oxycodone.  AVS to patient via [employer].  Patient will return 4/25/25 for next appointment.   Patient discharged in stable condition accompanied by: self.  Departure Mode: Ambulatory.      Farhana Dias RN

## 2025-04-17 ENCOUNTER — HOSPITAL ENCOUNTER (EMERGENCY)
Facility: CLINIC | Age: 25
Discharge: HOME | End: 2025-04-18
Attending: INTERNAL MEDICINE
Payer: MEDICAID

## 2025-04-17 DIAGNOSIS — D57.00 SICKLE CELL PAIN CRISIS (H): ICD-10-CM

## 2025-04-17 LAB
ALBUMIN SERPL BCG-MCNC: 4.4 G/DL (ref 3.5–5.2)
ALBUMIN UR-MCNC: NEGATIVE MG/DL
ALP SERPL-CCNC: 57 U/L (ref 40–150)
ALT SERPL W P-5'-P-CCNC: 25 U/L (ref 0–50)
ANION GAP SERPL CALCULATED.3IONS-SCNC: 10 MMOL/L (ref 7–15)
APPEARANCE UR: CLEAR
AST SERPL W P-5'-P-CCNC: 54 U/L (ref 0–45)
BASOPHILS # BLD AUTO: 0.2 10E3/UL (ref 0–0.2)
BASOPHILS NFR BLD AUTO: 2 %
BILIRUB SERPL-MCNC: 2.8 MG/DL
BILIRUB UR QL STRIP: NEGATIVE
BUN SERPL-MCNC: 7.1 MG/DL (ref 6–20)
CALCIUM SERPL-MCNC: 8.4 MG/DL (ref 8.8–10.4)
CHLORIDE SERPL-SCNC: 104 MMOL/L (ref 98–107)
COLOR UR AUTO: ABNORMAL
CREAT SERPL-MCNC: 0.55 MG/DL (ref 0.51–0.95)
EGFRCR SERPLBLD CKD-EPI 2021: >90 ML/MIN/1.73M2
EOSINOPHIL # BLD AUTO: 0.3 10E3/UL (ref 0–0.7)
EOSINOPHIL NFR BLD AUTO: 3 %
ERYTHROCYTE [DISTWIDTH] IN BLOOD BY AUTOMATED COUNT: 21.1 % (ref 10–15)
GLUCOSE SERPL-MCNC: 106 MG/DL (ref 70–99)
GLUCOSE UR STRIP-MCNC: NEGATIVE MG/DL
HCG UR QL: NEGATIVE
HCO3 SERPL-SCNC: 23 MMOL/L (ref 22–29)
HCT VFR BLD AUTO: 18.6 % (ref 35–47)
HGB BLD-MCNC: 6.8 G/DL (ref 11.7–15.7)
HGB UR QL STRIP: NEGATIVE
IMM GRANULOCYTES # BLD: 0.1 10E3/UL
IMM GRANULOCYTES NFR BLD: 0 %
KETONES UR STRIP-MCNC: NEGATIVE MG/DL
LEUKOCYTE ESTERASE UR QL STRIP: NEGATIVE
LYMPHOCYTES # BLD AUTO: 2 10E3/UL (ref 0.8–5.3)
LYMPHOCYTES NFR BLD AUTO: 17 %
MCH RBC QN AUTO: 29.6 PG (ref 26.5–33)
MCHC RBC AUTO-ENTMCNC: 36.6 G/DL (ref 31.5–36.5)
MCV RBC AUTO: 81 FL (ref 78–100)
MONOCYTES # BLD AUTO: 0.9 10E3/UL (ref 0–1.3)
MONOCYTES NFR BLD AUTO: 7 %
MUCOUS THREADS #/AREA URNS LPF: PRESENT /LPF
NEUTROPHILS # BLD AUTO: 8.6 10E3/UL (ref 1.6–8.3)
NEUTROPHILS NFR BLD AUTO: 71 %
NITRATE UR QL: NEGATIVE
NRBC # BLD AUTO: 0.2 10E3/UL
NRBC BLD AUTO-RTO: 1 /100
PH UR STRIP: 7.5 [PH] (ref 5–7)
PLATELET # BLD AUTO: 445 10E3/UL (ref 150–450)
POTASSIUM SERPL-SCNC: 3.8 MMOL/L (ref 3.4–5.3)
PROT SERPL-MCNC: 7.2 G/DL (ref 6.4–8.3)
RBC # BLD AUTO: 2.3 10E6/UL (ref 3.8–5.2)
RBC URINE: 0 /HPF
RETICS # AUTO: NORMAL 10*3/UL
RETICS/RBC NFR AUTO: NORMAL %
SODIUM SERPL-SCNC: 137 MMOL/L (ref 135–145)
SP GR UR STRIP: 1.01 (ref 1–1.03)
SQUAMOUS EPITHELIAL: 1 /HPF
UROBILINOGEN UR STRIP-MCNC: 4 MG/DL
WBC # BLD AUTO: 12 10E3/UL (ref 4–11)
WBC URINE: 1 /HPF

## 2025-04-17 PROCEDURE — 82310 ASSAY OF CALCIUM: CPT

## 2025-04-17 PROCEDURE — 99284 EMERGENCY DEPT VISIT MOD MDM: CPT | Mod: GC | Performed by: INTERNAL MEDICINE

## 2025-04-17 PROCEDURE — 250N000011 HC RX IP 250 OP 636: Performed by: INTERNAL MEDICINE

## 2025-04-17 PROCEDURE — 85004 AUTOMATED DIFF WBC COUNT: CPT

## 2025-04-17 PROCEDURE — 99284 EMERGENCY DEPT VISIT MOD MDM: CPT | Mod: 25 | Performed by: INTERNAL MEDICINE

## 2025-04-17 PROCEDURE — 250N000011 HC RX IP 250 OP 636: Mod: JZ

## 2025-04-17 PROCEDURE — 96375 TX/PRO/DX INJ NEW DRUG ADDON: CPT | Performed by: INTERNAL MEDICINE

## 2025-04-17 PROCEDURE — 96376 TX/PRO/DX INJ SAME DRUG ADON: CPT | Performed by: INTERNAL MEDICINE

## 2025-04-17 PROCEDURE — 82947 ASSAY GLUCOSE BLOOD QUANT: CPT

## 2025-04-17 PROCEDURE — 81001 URINALYSIS AUTO W/SCOPE: CPT

## 2025-04-17 PROCEDURE — 96361 HYDRATE IV INFUSION ADD-ON: CPT | Performed by: INTERNAL MEDICINE

## 2025-04-17 PROCEDURE — 85045 AUTOMATED RETICULOCYTE COUNT: CPT

## 2025-04-17 PROCEDURE — 81025 URINE PREGNANCY TEST: CPT

## 2025-04-17 PROCEDURE — 36415 COLL VENOUS BLD VENIPUNCTURE: CPT

## 2025-04-17 PROCEDURE — 258N000003 HC RX IP 258 OP 636

## 2025-04-17 PROCEDURE — 96374 THER/PROPH/DIAG INJ IV PUSH: CPT | Performed by: INTERNAL MEDICINE

## 2025-04-17 RX ORDER — HYDROMORPHONE HYDROCHLORIDE 1 MG/ML
0.5 INJECTION, SOLUTION INTRAMUSCULAR; INTRAVENOUS; SUBCUTANEOUS
Status: DISCONTINUED | OUTPATIENT
Start: 2025-04-17 | End: 2025-04-17

## 2025-04-17 RX ORDER — HYDROMORPHONE HYDROCHLORIDE 1 MG/ML
1 INJECTION, SOLUTION INTRAMUSCULAR; INTRAVENOUS; SUBCUTANEOUS
Status: COMPLETED | OUTPATIENT
Start: 2025-04-17 | End: 2025-04-17

## 2025-04-17 RX ORDER — SODIUM CHLORIDE, SODIUM LACTATE, POTASSIUM CHLORIDE, CALCIUM CHLORIDE 600; 310; 30; 20 MG/100ML; MG/100ML; MG/100ML; MG/100ML
INJECTION, SOLUTION INTRAVENOUS CONTINUOUS
Status: DISCONTINUED | OUTPATIENT
Start: 2025-04-17 | End: 2025-04-18 | Stop reason: HOSPADM

## 2025-04-17 RX ORDER — ONDANSETRON 2 MG/ML
4 INJECTION INTRAMUSCULAR; INTRAVENOUS ONCE
Status: COMPLETED | OUTPATIENT
Start: 2025-04-17 | End: 2025-04-17

## 2025-04-17 RX ORDER — HYDROMORPHONE HYDROCHLORIDE 1 MG/ML
1 INJECTION, SOLUTION INTRAMUSCULAR; INTRAVENOUS; SUBCUTANEOUS
Status: DISCONTINUED | OUTPATIENT
Start: 2025-04-17 | End: 2025-04-17

## 2025-04-17 RX ORDER — KETOROLAC TROMETHAMINE 30 MG/ML
30 INJECTION, SOLUTION INTRAMUSCULAR; INTRAVENOUS ONCE
Status: COMPLETED | OUTPATIENT
Start: 2025-04-17 | End: 2025-04-17

## 2025-04-17 RX ORDER — ONDANSETRON 2 MG/ML
8 INJECTION INTRAMUSCULAR; INTRAVENOUS ONCE
Status: COMPLETED | OUTPATIENT
Start: 2025-04-17 | End: 2025-04-17

## 2025-04-17 RX ORDER — HEPARIN SODIUM (PORCINE) LOCK FLUSH IV SOLN 100 UNIT/ML 100 UNIT/ML
5-10 SOLUTION INTRAVENOUS
Status: DISCONTINUED | OUTPATIENT
Start: 2025-04-17 | End: 2025-04-18 | Stop reason: HOSPADM

## 2025-04-17 RX ADMIN — ONDANSETRON 8 MG: 2 INJECTION INTRAMUSCULAR; INTRAVENOUS at 18:45

## 2025-04-17 RX ADMIN — HYDROMORPHONE HYDROCHLORIDE 1 MG: 1 INJECTION, SOLUTION INTRAMUSCULAR; INTRAVENOUS; SUBCUTANEOUS at 18:43

## 2025-04-17 RX ADMIN — HYDROMORPHONE HYDROCHLORIDE 1 MG: 1 INJECTION, SOLUTION INTRAMUSCULAR; INTRAVENOUS; SUBCUTANEOUS at 23:34

## 2025-04-17 RX ADMIN — SODIUM CHLORIDE, SODIUM LACTATE, POTASSIUM CHLORIDE, AND CALCIUM CHLORIDE: .6; .31; .03; .02 INJECTION, SOLUTION INTRAVENOUS at 19:00

## 2025-04-17 RX ADMIN — KETOROLAC TROMETHAMINE 30 MG: 30 INJECTION, SOLUTION INTRAMUSCULAR at 18:59

## 2025-04-17 RX ADMIN — ONDANSETRON 4 MG: 2 INJECTION INTRAMUSCULAR; INTRAVENOUS at 23:49

## 2025-04-17 RX ADMIN — HYDROMORPHONE HYDROCHLORIDE 1 MG: 1 INJECTION, SOLUTION INTRAMUSCULAR; INTRAVENOUS; SUBCUTANEOUS at 22:28

## 2025-04-17 ASSESSMENT — ACTIVITIES OF DAILY LIVING (ADL)
ADLS_ACUITY_SCORE: 58

## 2025-04-17 ASSESSMENT — ENCOUNTER SYMPTOMS: SICKLE CELL PAIN: 1

## 2025-04-17 NOTE — ED PROVIDER NOTES
ED Provider Note  [facility]      History     Chief Complaint   Patient presents with    Sickle Cell Pain Crisis     Pain 9/10.        Sickle Cell Pain Crisis    Jennifer Cervantes is a 25 year old female with past medical history of sickle cell disease presents today with lower abdominal pain, left leg pain and back pain that began earlier this morning.  She describes as an 8 out of 10 pain that is consistent with prior sickle cell pain crisises.  She does not report dysuria or polyuria, no vaginal symptoms, no diarrhea.  She does not endorse chest pain or shortness of breath.    Physical Exam   BP: 128/85  Pulse: 93  Temp: 98  F (36.7  C)  Resp: 18  SpO2: 95 %  Physical Exam  Constitutional:       General: She is not in acute distress.     Appearance: Normal appearance. She is not diaphoretic.   HENT:      Head: Atraumatic.      Mouth/Throat:      Mouth: Mucous membranes are moist.   Eyes:      General: No scleral icterus.     Conjunctiva/sclera: Conjunctivae normal.   Cardiovascular:      Rate and Rhythm: Normal rate.      Heart sounds: Normal heart sounds.   Pulmonary:      Effort: No respiratory distress.      Breath sounds: Normal breath sounds.   Abdominal:      General: Abdomen is flat.      Tenderness: There is abdominal tenderness in the right lower quadrant, suprapubic area and left lower quadrant.   Musculoskeletal:      Cervical back: Neck supple.   Skin:     General: Skin is warm.      Findings: No rash.   Neurological:      Mental Status: She is alert.           ED Course, Procedures, & Data      Procedures            No results found for any visits on 04/17/25.  Medications - No data to display  Labs Ordered and Resulted from Time of ED Arrival to Time of ED Departure - No data to display  No orders to display          Critical care was not performed.     Medical Decision Making  The patient's presentation was of moderate complexity (a chronic illness mild to moderate  exacerbation, progression, or side effect of treatment).    The patient's evaluation involved:  ordering and/or review of 3+ test(s) in this encounter (see separate area of note for details)    The patient's management necessitated high risk (a parenteral controlled substance).    Assessment & Plan    Patient presenting with sickle cell pain crisis. Vitals in the ED stable. Physical exam ***. Initial differential diagnosis includes but not limited to sickle cell pain crisis, UTI, pregnancy, other. Nursing notes reviewed.    CBC significant for leukocytosis at 12, hemoglobin at 6.8, this is consistent with recent labs.  CMP reveals elevated total bilirubin and elevated AST also consistent with with prior.  hCG negative.  UA noninfectious.  Reticulocyte count machine not working today.     Patient started on emergency care plan, after receiving first dose of Dilaudid Pyxis system was malfunctioning and there is an extremely long delay in receiving subsequent doses.  First dose given at 1843, second dose given at 2230.    The complete clinical picture is most consistent with sickle cell pain crisis pain crisis. The patient was advised to return to the ED if worsening symptoms, new concerning symptoms, or any urgent health concerns. At completion of my shift, patient pending completion of emergency care plan, please see Dr. Tse's attestation for final ED course and disposition.         Final diagnoses:   None     New Prescriptions    No medications on file     --  Jitendra Hughes PA-C   Emergency Medicine   [facility] EMERGENCY DEPARTMENT  4/17/2025      I have reviewed the nursing notes. I have reviewed the findings, diagnosis, plan and need for follow up with the patient.    New Prescriptions    No medications on file       Final diagnoses:   None       Judson Tse Md  [facility] EMERGENCY DEPARTMENT  4/17/2025   1.6 - 8.3 10e3/uL    Absolute Lymphocytes 2.0 0.8 - 5.3 10e3/uL    Absolute Monocytes 0.9 0.0 - 1.3 10e3/uL    Absolute Eosinophils 0.3 0.0 - 0.7 10e3/uL    Absolute Basophils 0.2 0.0 - 0.2 10e3/uL    Absolute Immature Granulocytes 0.1 <=0.4 10e3/uL    Absolute NRBCs 0.2 10e3/uL   CBC with platelets differential     Status: Abnormal    Narrative    The following orders were created for panel order CBC with platelets differential.  Procedure                               Abnormality         Status                     ---------                               -----------         ------                     CBC with platelets and ...[[id number]]  Abnormal            Final result                 Please view results for these tests on the individual orders.     Medications   lactated ringers infusion ( Intravenous Rate/Dose Verify 4/17/25 2229)   heparin lock flush 100 unit/mL injection 5-10 mL (has no administration in time range)   sodium chloride (PF) 0.9% PF flush 10-20 mL (has no administration in time range)   ketorolac (TORADOL) injection 30 mg (30 mg Intravenous $Given 4/17/25 1859)   ondansetron (ZOFRAN) injection 8 mg (8 mg Intravenous $Given 4/17/25 1845)   HYDROmorphone (PF) (DILAUDID) injection 1 mg (1 mg Intravenous $Given 4/17/25 2334)   ondansetron (ZOFRAN) injection 4 mg (4 mg Intravenous $Given 4/17/25 2349)     Labs Ordered and Resulted from Time of ED Arrival to Time of ED Departure   COMPREHENSIVE METABOLIC PANEL - Abnormal       Result Value    Sodium 137      Potassium 3.8      Carbon Dioxide (CO2) 23      Anion Gap 10      Urea Nitrogen 7.1      Creatinine 0.55      GFR Estimate >90      Calcium 8.4 (*)     Chloride 104      Glucose 106 (*)     Alkaline Phosphatase 57      AST 54 (*)     ALT 25      Protein Total 7.2      Albumin 4.4      Bilirubin Total 2.8 (*)    ROUTINE UA WITH MICROSCOPIC REFLEX TO CULTURE - Abnormal    Color Urine Light Yellow      Appearance Urine Clear      Glucose Urine  Negative      Bilirubin Urine Negative      Ketones Urine Negative      Specific Gravity Urine 1.009      Blood Urine Negative      pH Urine 7.5 (*)     Protein Albumin Urine Negative      Urobilinogen Urine 4.0 (*)     Nitrite Urine Negative      Leukocyte Esterase Urine Negative      Mucus Urine Present (*)     RBC Urine 0      WBC Urine 1      Squamous Epithelials Urine 1     CBC WITH PLATELETS AND DIFFERENTIAL - Abnormal    WBC Count 12.0 (*)     RBC Count 2.30 (*)     Hemoglobin 6.8 (*)     Hematocrit 18.6 (*)     MCV 81      MCH 29.6      MCHC 36.6 (*)     RDW 21.1 (*)     Platelet Count 445      % Neutrophils 71      % Lymphocytes 17      % Monocytes 7      % Eosinophils 3      % Basophils 2      % Immature Granulocytes 0      NRBCs per 100 WBC 1 (*)     Absolute Neutrophils 8.6 (*)     Absolute Lymphocytes 2.0      Absolute Monocytes 0.9      Absolute Eosinophils 0.3      Absolute Basophils 0.2      Absolute Immature Granulocytes 0.1      Absolute NRBCs 0.2     HCG QUALITATIVE URINE - Normal    hCG Urine Qualitative Negative     RETICULOCYTE COUNT    % Reticulocyte        Absolute Reticulocyte         No orders to display          Critical care was not performed.     Medical Decision Making  The patient's presentation was of moderate complexity (a chronic illness mild to moderate exacerbation, progression, or side effect of treatment).    The patient's evaluation involved:  ordering and/or review of 3+ test(s) in this encounter (see separate area of note for details)    The patient's management necessitated high risk (a parenteral controlled substance).    Assessment & Plan    Patient presenting with sickle cell pain crisis. Vitals in the ED stable. Physical exam significant for tenderness to palpation in the lower abdomen. Initial differential diagnosis includes but not limited to sickle cell pain crisis, UTI, pregnancy, other. Nursing notes reviewed.    CBC significant for leukocytosis at 12, hemoglobin at  6.8, this is consistent with recent labs.  CMP reveals elevated total bilirubin and elevated AST also consistent with with prior.  hCG negative.  UA noninfectious.  Reticulocyte count machine not working today.     Patient started on emergency care plan, after receiving first dose of Dilaudid Pyxis system was malfunctioning and there is an extremely long delay in receiving subsequent doses.  First dose given at 1843, second dose given at 2230.    The complete clinical picture is most consistent with sickle cell pain crisis pain crisis. The patient was advised to return to the ED if worsening symptoms, new concerning symptoms, or any urgent health concerns. At completion of my shift, patient pending completion of emergency care plan, please see Dr. Tse's attestation for final ED course and disposition.         Final diagnoses:   Sickle cell pain crisis (H)     New Prescriptions    No medications on file     --  Jitendra Hughes PA-C   Emergency Medicine   [facility] EMERGENCY DEPARTMENT  4/17/2025      I have reviewed the nursing notes. I have reviewed the findings, diagnosis, plan and need for follow up with the patient.    New Prescriptions    No medications on file       Final diagnoses:   Sickle cell pain crisis (H)     --    ED Attending Physician Attestation    I Judson Tse MD, MD, cared for this patient with the Advanced Practice Provider (ANDREAS). I personally provided a substantive portion of the care for this patient, including approving the care plan for the number and complexity of problems addressed and taking responsibility related to the risk of complications and/or morbidity or mortality of patient management. Please see the ANDREAS's documentation for full details.    Summary of HPI, PE, ED Course   Patient is a 25 year old female evaluated in the emergency department for sickle cell SS. Exam and ED course notable for pain crisis, no suggetion of acute chest or other compolications,  followed the care plan with relief. After the completion of care in the emergency department, the patient was discharged.      Judson Tse MD, MD  Emergency Medicine    Judson Tse Md  [facility] EMERGENCY DEPARTMENT  4/17/2025     Jitendra Hughes  04/18/25 1846

## 2025-04-18 VITALS
OXYGEN SATURATION: 96 % | RESPIRATION RATE: 16 BRPM | SYSTOLIC BLOOD PRESSURE: 109 MMHG | TEMPERATURE: 98 F | DIASTOLIC BLOOD PRESSURE: 68 MMHG | HEART RATE: 93 BPM

## 2025-04-18 PROCEDURE — 250N000011 HC RX IP 250 OP 636: Performed by: INTERNAL MEDICINE

## 2025-04-18 RX ADMIN — HEPARIN 5 ML: 100 SYRINGE at 00:53

## 2025-04-18 ASSESSMENT — ACTIVITIES OF DAILY LIVING (ADL): ADLS_ACUITY_SCORE: 58

## 2025-04-18 NOTE — ED NOTES
Writer unable to dispense Dilaudid for pain managmnt. Call made to pharmacy to update regarding issues w/ pyxis device. Pharm staff stated they will make update tech to come and review device. Pt updated  regarding dilemma.

## 2025-04-18 NOTE — DISCHARGE INSTRUCTIONS
Please make an appointment to follow up with Your Hematologist or Your Primary Care Provider as soon as possible even if entirely better.

## 2025-04-18 NOTE — ED NOTES
Follow-up call made to pharmacy regarding status of Dilaudid for pain managmnt. Staff stated call was made to Pyxis tech to assess device.

## 2025-04-21 ENCOUNTER — NURSE TRIAGE (OUTPATIENT)
Dept: ONCOLOGY | Facility: CLINIC | Age: 25
End: 2025-04-21
Payer: MEDICAID

## 2025-04-21 ENCOUNTER — PATIENT OUTREACH (OUTPATIENT)
Dept: CARE COORDINATION | Facility: CLINIC | Age: 25
End: 2025-04-21
Payer: MEDICAID

## 2025-04-21 ENCOUNTER — INFUSION THERAPY VISIT (OUTPATIENT)
Dept: INFUSION THERAPY | Facility: CLINIC | Age: 25
End: 2025-04-21
Attending: PEDIATRICS
Payer: MEDICAID

## 2025-04-21 VITALS
OXYGEN SATURATION: 96 % | DIASTOLIC BLOOD PRESSURE: 78 MMHG | HEART RATE: 86 BPM | RESPIRATION RATE: 16 BRPM | SYSTOLIC BLOOD PRESSURE: 124 MMHG | TEMPERATURE: 98.3 F

## 2025-04-21 DIAGNOSIS — G81.10 SPASTIC HEMIPLEGIA, UNSPECIFIED ETIOLOGY, UNSPECIFIED LATERALITY (H): ICD-10-CM

## 2025-04-21 DIAGNOSIS — D57.00 SICKLE CELL PAIN CRISIS (H): Primary | ICD-10-CM

## 2025-04-21 PROCEDURE — 96376 TX/PRO/DX INJ SAME DRUG ADON: CPT

## 2025-04-21 PROCEDURE — 96375 TX/PRO/DX INJ NEW DRUG ADDON: CPT

## 2025-04-21 PROCEDURE — 258N000003 HC RX IP 258 OP 636: Performed by: PEDIATRICS

## 2025-04-21 PROCEDURE — 96361 HYDRATE IV INFUSION ADD-ON: CPT

## 2025-04-21 PROCEDURE — 96374 THER/PROPH/DIAG INJ IV PUSH: CPT

## 2025-04-21 PROCEDURE — 250N000013 HC RX MED GY IP 250 OP 250 PS 637: Performed by: PEDIATRICS

## 2025-04-21 PROCEDURE — 250N000011 HC RX IP 250 OP 636: Performed by: PEDIATRICS

## 2025-04-21 RX ORDER — KETOROLAC TROMETHAMINE 30 MG/ML
30 INJECTION, SOLUTION INTRAMUSCULAR; INTRAVENOUS ONCE
Start: 2025-07-01 | End: 2025-07-01

## 2025-04-21 RX ORDER — ONDANSETRON 2 MG/ML
8 INJECTION INTRAMUSCULAR; INTRAVENOUS EVERY 6 HOURS PRN
Status: DISCONTINUED | OUTPATIENT
Start: 2025-04-21 | End: 2025-04-21 | Stop reason: HOSPADM

## 2025-04-21 RX ORDER — HEPARIN SODIUM,PORCINE 10 UNIT/ML
5 VIAL (ML) INTRAVENOUS
OUTPATIENT
Start: 2025-07-01

## 2025-04-21 RX ORDER — ONDANSETRON 2 MG/ML
8 INJECTION INTRAMUSCULAR; INTRAVENOUS EVERY 6 HOURS PRN
Start: 2025-07-01

## 2025-04-21 RX ORDER — DIPHENHYDRAMINE HCL 25 MG
50 CAPSULE ORAL
Start: 2025-07-01

## 2025-04-21 RX ORDER — HEPARIN SODIUM (PORCINE) LOCK FLUSH IV SOLN 100 UNIT/ML 100 UNIT/ML
5 SOLUTION INTRAVENOUS
OUTPATIENT
Start: 2025-07-01

## 2025-04-21 RX ORDER — DIPHENHYDRAMINE HCL 25 MG
50 CAPSULE ORAL
Status: COMPLETED | OUTPATIENT
Start: 2025-04-21 | End: 2025-04-21

## 2025-04-21 RX ORDER — KETOROLAC TROMETHAMINE 30 MG/ML
30 INJECTION, SOLUTION INTRAMUSCULAR; INTRAVENOUS ONCE
Status: COMPLETED | OUTPATIENT
Start: 2025-04-21 | End: 2025-04-21

## 2025-04-21 RX ORDER — ONDANSETRON 4 MG/1
8 TABLET, FILM COATED ORAL
Start: 2025-07-01

## 2025-04-21 RX ORDER — HEPARIN SODIUM (PORCINE) LOCK FLUSH IV SOLN 100 UNIT/ML 100 UNIT/ML
5 SOLUTION INTRAVENOUS
Status: DISCONTINUED | OUTPATIENT
Start: 2025-04-21 | End: 2025-04-21 | Stop reason: HOSPADM

## 2025-04-21 RX ADMIN — HYDROMORPHONE HYDROCHLORIDE 1 MG: 1 INJECTION, SOLUTION INTRAMUSCULAR; INTRAVENOUS; SUBCUTANEOUS at 15:31

## 2025-04-21 RX ADMIN — SODIUM CHLORIDE, POTASSIUM CHLORIDE, SODIUM LACTATE AND CALCIUM CHLORIDE 1000 ML: 600; 310; 30; 20 INJECTION, SOLUTION INTRAVENOUS at 13:24

## 2025-04-21 RX ADMIN — HYDROMORPHONE HYDROCHLORIDE 1 MG: 1 INJECTION, SOLUTION INTRAMUSCULAR; INTRAVENOUS; SUBCUTANEOUS at 13:15

## 2025-04-21 RX ADMIN — HYDROMORPHONE HYDROCHLORIDE 1 MG: 1 INJECTION, SOLUTION INTRAMUSCULAR; INTRAVENOUS; SUBCUTANEOUS at 14:28

## 2025-04-21 RX ADMIN — DIPHENHYDRAMINE HCL 50 MG: 25 CAPSULE ORAL at 13:29

## 2025-04-21 RX ADMIN — ONDANSETRON 8 MG: 2 INJECTION INTRAMUSCULAR; INTRAVENOUS at 13:21

## 2025-04-21 RX ADMIN — KETOROLAC TROMETHAMINE 30 MG: 30 INJECTION, SOLUTION INTRAMUSCULAR; INTRAVENOUS at 13:18

## 2025-04-21 RX ADMIN — Medication 5 ML: at 15:32

## 2025-04-21 NOTE — PROGRESS NOTES
Ambulatory Care Management- Transportation Support  Specialty SW - [facility]     Data/Intervention:  Patient Name:    Jennifer Cervantes     /Age: 2000 (25 year old)     CCRC team member assisting Specialty SW with transportation request.      Assessment:  CHW/CTA booked online with [employer] to arrange medical appointment transportation in conjunction with patient's insurance. Your reference number is [id number]_Return Booking # is [id number]    Taxi company: [employer]    Patient will need to call above taxi company at phone number: [phone] when ready for return ride home.      Plan:  Patient is aware of the transportation plan.  available to assist with any other needs.      Maritza Vick MA

## 2025-04-21 NOTE — TELEPHONE ENCOUNTER
1046 Call placed to [facility], spoke w/charge nurse, Echo, who states they do have an opening at 1pm and can give all 3 doses for pt. She will have pt scheduled.    1047 Call placed to pt and confirmed she is able to make the 1pm appt. Informed pt writer will send message to  to help with transportation. Pt verbalized understanding.

## 2025-04-21 NOTE — PROGRESS NOTES
"Infusion Nursing Note:  Jennifer Cervantes presents today for IV Fluids and Pain Medications.    Patient seen by provider today: No   present during visit today: Not Applicable.    Note: Pt c/o 8/10 pain generalized over her \"whole body.\" One liter LR, IV dilaudid x3, IV toradol, IV zofran and PO benadryl administered with relief.  At time of discharge, Jennifer rated her pain 4/10 and this was tolerable for her.        Intravenous Access:  Implanted Port.    Treatment Conditions:  Not Applicable.      Post Infusion Assessment:  Patient tolerated infusion without incident.  Blood return noted pre and post infusion.  Site patent and intact, free from redness, edema or discomfort.  No evidence of extravasations.  Access discontinued per protocol.       Discharge Plan:   Discharge instructions reviewed with: Patient.  Patient and/or family verbalized understanding of discharge instructions and all questions answered.  AVS to patient via [employer].  Patient will return PRN for next appointment.   Patient discharged in stable condition accompanied by: self.  Departure Mode: Ambulatory.      Aravind Brown RN   "

## 2025-04-21 NOTE — TELEPHONE ENCOUNTER
"Oncology Nurse Triage - Sickle Cell Pain Crisis:    Situation: Jennifer  calling about Sickle Cell Pain Crisis, requesting to be added on for IV fluids and pain medicine    Background:     Patient's last infusion was 04/17/25 ED  Last clinic visit date:04/11/25 w/Patricia Mantilla  Does patient have active treatment plan? Yes    Assessment of Symptoms:  Onset/Duration of symptoms: 1 day    Is it typical sickle cell pain? Yes  Location: \"whole body\"  Character: Sharp  Intensity: 10/10    Any radiation of pain, numbness, tingling, weakness, warmth, swelling, discoloration of arms or legs?No    Fever? No    Chest Pain Present: No    Shortness of breath: No    Other home therapies tried: HEAT/HEATING PAD and WARM BATH     Last home medication taken and when: 2 pm oxycodone    Any Refills Needed?: No    Does patient have transportation & length of time to get to clinic: Yes, if early appt available she has her own transportation. However, if appt is after 1000 she will need assistance.     Recommendations:     Reviewed with pt if she do not hear from the infusion center by 2pm then she will not be able to get in for an infusion today. If symptoms worsen while waiting for call back, and/or you experience fever, chills, SOB, chest pain, cough, n/v, dizziness, numbness, swelling, discoloration of extremities, then seek emergency evaluation in Emergency Department.             "

## 2025-04-21 NOTE — TELEPHONE ENCOUNTER
Pt calling to check states of infusion wait list. She is open to going to other locations if needed. She is trying to avoid going to ED.   Writer informed will call pt back once list of other openings is received around 0945. Pt voiced understanding.     0953 call to [facility], spoke w/ charge nurse, Echo, who states they could take pt at 1pm but would only be able to give 2 doses.     0958 call to [facility], spoke w/ charge Tati, who states they do not have any openings for rest of the day, they only had AM openings.     0959 call to pt, offered [facility] slot for 2 doses of pain meds. Pt declined, said she would benefit from full 3 doses d/t pain. Writer informed will keep pt to infusion list here at [facility] and call her with any openings. Encouraged ED if symptoms worsen. Pt voiced understanding.

## 2025-04-23 ENCOUNTER — HOSPITAL ENCOUNTER (EMERGENCY)
Facility: CLINIC | Age: 25
Discharge: HOME | End: 2025-04-24
Attending: STUDENT IN AN ORGANIZED HEALTH CARE EDUCATION/TRAINING PROGRAM
Payer: MEDICAID

## 2025-04-23 DIAGNOSIS — D57.00 SICKLE CELL PAIN CRISIS (H): ICD-10-CM

## 2025-04-23 DIAGNOSIS — D57.00 SICKLE CELL DISEASE WITH CRISIS (H): ICD-10-CM

## 2025-04-23 DIAGNOSIS — R10.13 EPIGASTRIC PAIN: ICD-10-CM

## 2025-04-23 LAB
BASOPHILS # BLD AUTO: 0.3 10E3/UL (ref 0–0.2)
BASOPHILS NFR BLD AUTO: 2 %
EOSINOPHIL # BLD AUTO: 0.4 10E3/UL (ref 0–0.7)
EOSINOPHIL NFR BLD AUTO: 2 %
ERYTHROCYTE [DISTWIDTH] IN BLOOD BY AUTOMATED COUNT: 21.2 % (ref 10–15)
HCT VFR BLD AUTO: 20.2 % (ref 35–47)
HGB BLD-MCNC: 7.2 G/DL (ref 11.7–15.7)
IMM GRANULOCYTES # BLD: 0.1 10E3/UL
IMM GRANULOCYTES NFR BLD: 0 %
LYMPHOCYTES # BLD AUTO: 3.1 10E3/UL (ref 0.8–5.3)
LYMPHOCYTES NFR BLD AUTO: 20 %
MCH RBC QN AUTO: 30.3 PG (ref 26.5–33)
MCHC RBC AUTO-ENTMCNC: 35.6 G/DL (ref 31.5–36.5)
MCV RBC AUTO: 85 FL (ref 78–100)
MONOCYTES # BLD AUTO: 1.2 10E3/UL (ref 0–1.3)
MONOCYTES NFR BLD AUTO: 7 %
NEUTROPHILS # BLD AUTO: 10.9 10E3/UL (ref 1.6–8.3)
NEUTROPHILS NFR BLD AUTO: 69 %
NRBC # BLD AUTO: 0.2 10E3/UL
NRBC BLD AUTO-RTO: 1 /100
PLATELET # BLD AUTO: 409 10E3/UL (ref 150–450)
RBC # BLD AUTO: 2.38 10E6/UL (ref 3.8–5.2)
WBC # BLD AUTO: 15.8 10E3/UL (ref 4–11)

## 2025-04-23 PROCEDURE — 99284 EMERGENCY DEPT VISIT MOD MDM: CPT | Mod: 25 | Performed by: STUDENT IN AN ORGANIZED HEALTH CARE EDUCATION/TRAINING PROGRAM

## 2025-04-23 PROCEDURE — 85025 COMPLETE CBC W/AUTO DIFF WBC: CPT | Performed by: STUDENT IN AN ORGANIZED HEALTH CARE EDUCATION/TRAINING PROGRAM

## 2025-04-23 PROCEDURE — 83690 ASSAY OF LIPASE: CPT | Performed by: STUDENT IN AN ORGANIZED HEALTH CARE EDUCATION/TRAINING PROGRAM

## 2025-04-23 PROCEDURE — 96375 TX/PRO/DX INJ NEW DRUG ADDON: CPT | Performed by: STUDENT IN AN ORGANIZED HEALTH CARE EDUCATION/TRAINING PROGRAM

## 2025-04-23 PROCEDURE — 250N000013 HC RX MED GY IP 250 OP 250 PS 637: Performed by: STUDENT IN AN ORGANIZED HEALTH CARE EDUCATION/TRAINING PROGRAM

## 2025-04-23 PROCEDURE — 250N000011 HC RX IP 250 OP 636: Mod: JZ | Performed by: STUDENT IN AN ORGANIZED HEALTH CARE EDUCATION/TRAINING PROGRAM

## 2025-04-23 PROCEDURE — 99285 EMERGENCY DEPT VISIT HI MDM: CPT | Performed by: STUDENT IN AN ORGANIZED HEALTH CARE EDUCATION/TRAINING PROGRAM

## 2025-04-23 PROCEDURE — 96374 THER/PROPH/DIAG INJ IV PUSH: CPT | Performed by: STUDENT IN AN ORGANIZED HEALTH CARE EDUCATION/TRAINING PROGRAM

## 2025-04-23 PROCEDURE — 84703 CHORIONIC GONADOTROPIN ASSAY: CPT | Performed by: STUDENT IN AN ORGANIZED HEALTH CARE EDUCATION/TRAINING PROGRAM

## 2025-04-23 PROCEDURE — 36415 COLL VENOUS BLD VENIPUNCTURE: CPT | Performed by: STUDENT IN AN ORGANIZED HEALTH CARE EDUCATION/TRAINING PROGRAM

## 2025-04-23 RX ORDER — KETOROLAC TROMETHAMINE 30 MG/ML
30 INJECTION, SOLUTION INTRAMUSCULAR; INTRAVENOUS ONCE
Status: COMPLETED | OUTPATIENT
Start: 2025-04-23 | End: 2025-04-23

## 2025-04-23 RX ORDER — HYDROMORPHONE HYDROCHLORIDE 1 MG/ML
1 INJECTION, SOLUTION INTRAMUSCULAR; INTRAVENOUS; SUBCUTANEOUS
Status: DISCONTINUED | OUTPATIENT
Start: 2025-04-23 | End: 2025-04-24

## 2025-04-23 RX ORDER — SODIUM CHLORIDE, SODIUM LACTATE, POTASSIUM CHLORIDE, CALCIUM CHLORIDE 600; 310; 30; 20 MG/100ML; MG/100ML; MG/100ML; MG/100ML
INJECTION, SOLUTION INTRAVENOUS CONTINUOUS
Status: DISCONTINUED | OUTPATIENT
Start: 2025-04-23 | End: 2025-04-24 | Stop reason: HOSPADM

## 2025-04-23 RX ORDER — ONDANSETRON 2 MG/ML
8 INJECTION INTRAMUSCULAR; INTRAVENOUS ONCE
Status: COMPLETED | OUTPATIENT
Start: 2025-04-23 | End: 2025-04-23

## 2025-04-23 RX ORDER — OXYCODONE HYDROCHLORIDE 10 MG/1
10 TABLET ORAL EVERY 6 HOURS PRN
Qty: 12 TABLET | Refills: 0 | Status: SHIPPED | OUTPATIENT
Start: 2025-04-23

## 2025-04-23 RX ORDER — MAGNESIUM HYDROXIDE/ALUMINUM HYDROXICE/SIMETHICONE 120; 1200; 1200 MG/30ML; MG/30ML; MG/30ML
30 SUSPENSION ORAL ONCE
Status: COMPLETED | OUTPATIENT
Start: 2025-04-23 | End: 2025-04-23

## 2025-04-23 RX ORDER — METOCLOPRAMIDE HYDROCHLORIDE 5 MG/ML
5 INJECTION INTRAMUSCULAR; INTRAVENOUS ONCE
Status: COMPLETED | OUTPATIENT
Start: 2025-04-23 | End: 2025-04-23

## 2025-04-23 RX ADMIN — HYDROMORPHONE HYDROCHLORIDE 1 MG: 1 INJECTION, SOLUTION INTRAMUSCULAR; INTRAVENOUS; SUBCUTANEOUS at 23:45

## 2025-04-23 RX ADMIN — PANTOPRAZOLE SODIUM 40 MG: 40 INJECTION, POWDER, LYOPHILIZED, FOR SOLUTION INTRAVENOUS at 23:49

## 2025-04-23 RX ADMIN — ALUMINUM HYDROXIDE, MAGNESIUM HYDROXIDE, AND SIMETHICONE 30 ML: 200; 200; 20 SUSPENSION ORAL at 23:39

## 2025-04-23 RX ADMIN — KETOROLAC TROMETHAMINE 30 MG: 30 INJECTION, SOLUTION INTRAMUSCULAR at 23:43

## 2025-04-23 RX ADMIN — METOCLOPRAMIDE HYDROCHLORIDE 5 MG: 5 INJECTION INTRAMUSCULAR; INTRAVENOUS at 23:40

## 2025-04-23 ASSESSMENT — ACTIVITIES OF DAILY LIVING (ADL): ADLS_ACUITY_SCORE: 58

## 2025-04-23 NOTE — TELEPHONE ENCOUNTER
Narcotic Refill Request  Person Requesting Refill: Jennifer    Medication(s) requested:  oxycodone IR 10 mg tablet  Last fill date and by whom:  4/16/25 by Patricia Mantilla CNP  What pain is the medication treating: sickle cell pain  How is the medication being taken?:as needed. Taking it every 4-6 hours now because she has been in pain since yesterday.  Does pt have enough for today? Yes. She will be out first thing in the morning tomorrow.  Is pain being adequately controlled on the current regimen?: Yes.  Experiencing any side effects from medication?: No    Date of most recent appointment:  4/11/25 with Patricia Mantilla CNP  Any No Show Visits:No  Next appointment:   4/25/25 with Patricia Mantilla CNP     Reviewed: No Access    Routed/Paged provider: Patricia Mantilla CNP and MAURISIO Asencio

## 2025-04-24 VITALS
WEIGHT: 159.3 LBS | RESPIRATION RATE: 16 BRPM | BODY MASS INDEX: 27.2 KG/M2 | DIASTOLIC BLOOD PRESSURE: 61 MMHG | TEMPERATURE: 98.1 F | OXYGEN SATURATION: 93 % | HEIGHT: 64 IN | HEART RATE: 82 BPM | SYSTOLIC BLOOD PRESSURE: 121 MMHG

## 2025-04-24 LAB
ALBUMIN SERPL BCG-MCNC: 4.2 G/DL (ref 3.5–5.2)
ALBUMIN UR-MCNC: NEGATIVE MG/DL
ALP SERPL-CCNC: 59 U/L (ref 40–150)
ALT SERPL W P-5'-P-CCNC: 22 U/L (ref 0–50)
AMORPH CRY #/AREA URNS HPF: ABNORMAL /HPF
ANION GAP SERPL CALCULATED.3IONS-SCNC: 11 MMOL/L (ref 7–15)
APPEARANCE UR: ABNORMAL
AST SERPL W P-5'-P-CCNC: 41 U/L (ref 0–45)
BILIRUB SERPL-MCNC: 2.3 MG/DL
BILIRUB UR QL STRIP: NEGATIVE
BUN SERPL-MCNC: 8.9 MG/DL (ref 6–20)
CALCIUM SERPL-MCNC: 8.5 MG/DL (ref 8.8–10.4)
CHLORIDE SERPL-SCNC: 104 MMOL/L (ref 98–107)
COLOR UR AUTO: YELLOW
CREAT SERPL-MCNC: 0.56 MG/DL (ref 0.51–0.95)
EGFRCR SERPLBLD CKD-EPI 2021: >90 ML/MIN/1.73M2
GLUCOSE SERPL-MCNC: 87 MG/DL (ref 70–99)
GLUCOSE UR STRIP-MCNC: NEGATIVE MG/DL
HCG SERPL QL: NEGATIVE
HCO3 SERPL-SCNC: 21 MMOL/L (ref 22–29)
HGB UR QL STRIP: NEGATIVE
KETONES UR STRIP-MCNC: NEGATIVE MG/DL
LEUKOCYTE ESTERASE UR QL STRIP: ABNORMAL
LIPASE SERPL-CCNC: 23 U/L (ref 13–60)
MUCOUS THREADS #/AREA URNS LPF: PRESENT /LPF
NITRATE UR QL: NEGATIVE
PH UR STRIP: 5.5 [PH] (ref 5–7)
POTASSIUM SERPL-SCNC: 3.9 MMOL/L (ref 3.4–5.3)
PROT SERPL-MCNC: 7.6 G/DL (ref 6.4–8.3)
RBC URINE: 0 /HPF
SODIUM SERPL-SCNC: 136 MMOL/L (ref 135–145)
SP GR UR STRIP: 1.01 (ref 1–1.03)
SQUAMOUS EPITHELIAL: 5 /HPF
UROBILINOGEN UR STRIP-MCNC: NORMAL MG/DL
WBC URINE: 7 /HPF

## 2025-04-24 PROCEDURE — 258N000003 HC RX IP 258 OP 636: Performed by: STUDENT IN AN ORGANIZED HEALTH CARE EDUCATION/TRAINING PROGRAM

## 2025-04-24 PROCEDURE — 250N000011 HC RX IP 250 OP 636: Mod: JZ | Performed by: STUDENT IN AN ORGANIZED HEALTH CARE EDUCATION/TRAINING PROGRAM

## 2025-04-24 PROCEDURE — 96361 HYDRATE IV INFUSION ADD-ON: CPT | Performed by: STUDENT IN AN ORGANIZED HEALTH CARE EDUCATION/TRAINING PROGRAM

## 2025-04-24 PROCEDURE — 87086 URINE CULTURE/COLONY COUNT: CPT | Performed by: STUDENT IN AN ORGANIZED HEALTH CARE EDUCATION/TRAINING PROGRAM

## 2025-04-24 PROCEDURE — 96376 TX/PRO/DX INJ SAME DRUG ADON: CPT | Performed by: STUDENT IN AN ORGANIZED HEALTH CARE EDUCATION/TRAINING PROGRAM

## 2025-04-24 PROCEDURE — 81003 URINALYSIS AUTO W/O SCOPE: CPT | Performed by: STUDENT IN AN ORGANIZED HEALTH CARE EDUCATION/TRAINING PROGRAM

## 2025-04-24 PROCEDURE — 36415 COLL VENOUS BLD VENIPUNCTURE: CPT | Performed by: STUDENT IN AN ORGANIZED HEALTH CARE EDUCATION/TRAINING PROGRAM

## 2025-04-24 PROCEDURE — 80053 COMPREHEN METABOLIC PANEL: CPT | Performed by: STUDENT IN AN ORGANIZED HEALTH CARE EDUCATION/TRAINING PROGRAM

## 2025-04-24 RX ORDER — HEPARIN SODIUM (PORCINE) LOCK FLUSH IV SOLN 100 UNIT/ML 100 UNIT/ML
5-10 SOLUTION INTRAVENOUS
Status: DISCONTINUED | OUTPATIENT
Start: 2025-04-24 | End: 2025-04-24 | Stop reason: HOSPADM

## 2025-04-24 RX ORDER — HYDROMORPHONE HYDROCHLORIDE 1 MG/ML
1 INJECTION, SOLUTION INTRAMUSCULAR; INTRAVENOUS; SUBCUTANEOUS ONCE
Status: COMPLETED | OUTPATIENT
Start: 2025-04-24 | End: 2025-04-24

## 2025-04-24 RX ADMIN — HYDROMORPHONE HYDROCHLORIDE 1 MG: 1 INJECTION, SOLUTION INTRAMUSCULAR; INTRAVENOUS; SUBCUTANEOUS at 01:58

## 2025-04-24 RX ADMIN — HEPARIN 5 ML: 100 SYRINGE at 01:58

## 2025-04-24 RX ADMIN — SODIUM CHLORIDE, SODIUM LACTATE, POTASSIUM CHLORIDE, AND CALCIUM CHLORIDE: .6; .31; .03; .02 INJECTION, SOLUTION INTRAVENOUS at 00:30

## 2025-04-24 RX ADMIN — HYDROMORPHONE HYDROCHLORIDE 1 MG: 1 INJECTION, SOLUTION INTRAMUSCULAR; INTRAVENOUS; SUBCUTANEOUS at 00:55

## 2025-04-24 ASSESSMENT — ACTIVITIES OF DAILY LIVING (ADL)
ADLS_ACUITY_SCORE: 58

## 2025-04-24 NOTE — ED NOTES
RN gave pt discharge paperwork and instructions. Upon VS reassessment pt O2 dipped to high 80s-low 90s. Pt placed on 2L nasal cannula. MD updated plan to reassess at 0300.

## 2025-04-24 NOTE — DISCHARGE INSTRUCTIONS
You were seen in the emergency department due to a sickle cell crisis and abdominal pain.  You had lab work that does show a slight increase in your white blood cell count, however this can happen due to to pain or inflammation.  You were given pain medication, and did feel better with them.  We would recommend that you follow-up with your primary care doctor, and we would recommend that you follow-up with a GI doctor to undergo endoscopy.  Return to the emergency department any worsening severe abdominal pain especially associated severe new nausea and vomiting, fevers and chills, or any other concerning symptoms

## 2025-04-24 NOTE — ED NOTES
Pt states ready for discharge. O2 95% on RA, no SOB, A&O x4, RR even and unlabored. Pt given discharge paperwork and instructions. No questions or concerns at this time. Ambulatory with steady gait.

## 2025-04-24 NOTE — ED TRIAGE NOTES
Patient c/o sharp pain all over her stomach. Patient said pain started this evening around dinner time. Patient took Oxycodone 10 mg around 1800.      Triage Assessment (Adult)       Row Name 04/23/25 8900          Triage Assessment    Airway WDL WDL        Respiratory WDL    Respiratory WDL WDL        Skin Circulation/Temperature WDL    Skin Circulation/Temperature WDL WDL        Cardiac WDL    Cardiac WDL WDL        Peripheral/Neurovascular WDL    Peripheral Neurovascular WDL WDL        Cognitive/Neuro/Behavioral WDL    Cognitive/Neuro/Behavioral WDL WDL

## 2025-04-24 NOTE — ED PROVIDER NOTES
"ED Provider Note  [facility] EMERGENCY DEPARTMENT ([facility])    4/23/25       ED PROVIDER NOTE     History     Chief Complaint   Patient presents with    Sickle Cell Pain Crisis     Patient c/o sharp pain all over her stomach. Patient said pain started this evening around dinner time. Patient took Oxycodone 10 mg around 1800.      HPI  Jennifer Cervantes is a 25 year old female with a notable history of sickle cell disease with a care plan in place, acute on chronic abdominal pain, CVA with residual deficits, PE on anticoagulation presenting to the emergency department due to upper abdominal pain that began earlier this morning, has been worse with anything that she does including eating, drinking or moving around.  Denies any chest pain, shortness of breath, no fevers or chills.  Notes that she has been treated as though this was a sickle cell crisis in the past, and sometimes does get relief from her sickle cell care plan, however also feels as though something else is going on with her abdomen, and has been told that she needs to have an endoscopy multiple times, but unfortunately has had difficulty was with rides in order to get her to these appointments.    She denies any rectal bleeding, any fevers or chills, no diarrhea, constipation.  No hematuria, urinary frequency.  No shortness of breath or cough, chest pain    Physical Exam   BP: 126/73  Pulse: 82  Temp: 98.1  F (36.7  C)  Resp: 16  Height: 162.6 cm (5' 4\")  Weight: 72.3 kg (159 lb 4.8 oz)  SpO2: 95 %  Physical Exam  GEN: Well appearing, non toxic, cooperative  HEENT: normocephalic and atraumatic, PERRLA, EOMI  CV: well-perfused, normal skin color for ethnicity, regular rate and rhythm, no murmurs rubs or gallops  PULM: breathing comfortably, in no respiratory distress, clear to auscultation upper lower lung fields  ABD: nondistended, soft, mild tenderness in the epigastrium, negative Viveros, negative Fidel " point tenderness, nonperitonitic  EXT: Full range of motion.  No edema.  NEURO: awake, conversant, grossly normal bilateral upper and lower extremity strength & ROM   SKIN: No rashes, ecchymosis, or lacerations  PSYCH: Calm and cooperative, interactive      ED Course, Procedures, & Data      Procedures        Results for orders placed or performed during the hospital encounter of 04/23/25   Comprehensive metabolic panel     Status: Abnormal   Result Value Ref Range    Sodium 136 135 - 145 mmol/L    Potassium 3.9 3.4 - 5.3 mmol/L    Carbon Dioxide (CO2) 21 (L) 22 - 29 mmol/L    Anion Gap 11 7 - 15 mmol/L    Urea Nitrogen 8.9 6.0 - 20.0 mg/dL    Creatinine 0.56 0.51 - 0.95 mg/dL    GFR Estimate >90 >60 mL/min/1.73m2    Calcium 8.5 (L) 8.8 - 10.4 mg/dL    Chloride 104 98 - 107 mmol/L    Glucose 87 70 - 99 mg/dL    Alkaline Phosphatase 59 40 - 150 U/L    AST 41 0 - 45 U/L    ALT 22 0 - 50 U/L    Protein Total 7.6 6.4 - 8.3 g/dL    Albumin 4.2 3.5 - 5.2 g/dL    Bilirubin Total 2.3 (H) <=1.2 mg/dL   Lipase     Status: Normal   Result Value Ref Range    Lipase 23 13 - 60 U/L   HCG qualitative Blood     Status: Normal   Result Value Ref Range    hCG Serum Qualitative Negative Negative   CBC with platelets and differential     Status: Abnormal   Result Value Ref Range    WBC Count 15.8 (H) 4.0 - 11.0 10e3/uL    RBC Count 2.38 (L) 3.80 - 5.20 10e6/uL    Hemoglobin 7.2 (L) 11.7 - 15.7 g/dL    Hematocrit 20.2 (L) 35.0 - 47.0 %    MCV 85 78 - 100 fL    MCH 30.3 26.5 - 33.0 pg    MCHC 35.6 31.5 - 36.5 g/dL    RDW 21.2 (H) 10.0 - 15.0 %    Platelet Count 409 150 - 450 10e3/uL    % Neutrophils 69 %    % Lymphocytes 20 %    % Monocytes 7 %    % Eosinophils 2 %    % Basophils 2 %    % Immature Granulocytes 0 %    NRBCs per 100 WBC 1 (H) <1 /100    Absolute Neutrophils 10.9 (H) 1.6 - 8.3 10e3/uL    Absolute Lymphocytes 3.1 0.8 - 5.3 10e3/uL    Absolute Monocytes 1.2 0.0 - 1.3 10e3/uL    Absolute Eosinophils 0.4 0.0 - 0.7 10e3/uL     Absolute Basophils 0.3 (H) 0.0 - 0.2 10e3/uL    Absolute Immature Granulocytes 0.1 <=0.4 10e3/uL    Absolute NRBCs 0.2 10e3/uL   CBC with platelets differential     Status: Abnormal    Narrative    The following orders were created for panel order CBC with platelets differential.  Procedure                               Abnormality         Status                     ---------                               -----------         ------                     CBC with platelets and ...[[id number]]  Abnormal            Final result                 Please view results for these tests on the individual orders.     Medications   lactated ringers infusion ( Intravenous $New Bag 4/24/25 0030)   HYDROmorphone (PF) (DILAUDID) injection 1 mg (has no administration in time range)   heparin lock flush 100 unit/mL injection 5-10 mL (has no administration in time range)   ketorolac (TORADOL) injection 30 mg (30 mg Intravenous $Given 4/23/25 2343)   ondansetron (ZOFRAN) injection 8 mg (8 mg Intravenous Not Given 4/23/25 2356)   pantoprazole (PROTONIX) IV push injection 40 mg (40 mg Intravenous $Given 4/23/25 2349)   alum & mag hydroxide-simethicone (MAALOX) suspension 30 mL (30 mLs Oral $Given 4/23/25 2339)   metoclopramide (REGLAN) injection 5 mg (5 mg Intravenous $Given 4/23/25 2340)     Labs Ordered and Resulted from Time of ED Arrival to Time of ED Departure   COMPREHENSIVE METABOLIC PANEL - Abnormal       Result Value    Sodium 136      Potassium 3.9      Carbon Dioxide (CO2) 21 (*)     Anion Gap 11      Urea Nitrogen 8.9      Creatinine 0.56      GFR Estimate >90      Calcium 8.5 (*)     Chloride 104      Glucose 87      Alkaline Phosphatase 59      AST 41      ALT 22      Protein Total 7.6      Albumin 4.2      Bilirubin Total 2.3 (*)    CBC WITH PLATELETS AND DIFFERENTIAL - Abnormal    WBC Count 15.8 (*)     RBC Count 2.38 (*)     Hemoglobin 7.2 (*)     Hematocrit 20.2 (*)     MCV 85      MCH 30.3      MCHC 35.6      RDW 21.2 (*)      Platelet Count 409      % Neutrophils 69      % Lymphocytes 20      % Monocytes 7      % Eosinophils 2      % Basophils 2      % Immature Granulocytes 0      NRBCs per 100 WBC 1 (*)     Absolute Neutrophils 10.9 (*)     Absolute Lymphocytes 3.1      Absolute Monocytes 1.2      Absolute Eosinophils 0.4      Absolute Basophils 0.3 (*)     Absolute Immature Granulocytes 0.1      Absolute NRBCs 0.2     LIPASE - Normal    Lipase 23     HCG QUALITATIVE PREGNANCY - Normal    hCG Serum Qualitative Negative     ROUTINE UA WITH MICROSCOPIC REFLEX TO CULTURE     No orders to display          Critical care was not performed.     Medical Decision Making  The patient's presentation was of high complexity (a chronic illness severe exacerbation, progression, or side effect of treatment).    The patient's evaluation involved:  review of external note(s) from 3+ sources (see separate area of note for details)  review of 3+ test result(s) ordered prior to this encounter (see separate area of note for details)  ordering and/or review of 3+ test(s) in this encounter (see separate area of note for details)    The patient's management necessitated high risk (a parenteral controlled substance).    Assessment & Plan    25-year-old female with a past medical history of sickle cell anemia, chronic pain, previous CVA with residual deficits, and PE on anticoagulation presenting to the emergency department due to abdominal pain that began this morning, primarily in the epigastrium noted to be hemodynamically stable, mild tenderness in the epigastrium, but otherwise relatively comfortable in evaluation.    She has had multiple ED evaluations for similar presentations in the past, and notes that she actually got medications back in the fall of last year that were more helpful for that than her pain regimen.  Looking back at these visits she did get both pantoprazole, Reglan, and Maalox in the past.  She feels reluctant to continue to just get  the rest of her pain medication regimen, and would be open to trying other options to try and improve her pain.    She does have a leukocytosis here, no fevers however, no significant diarrheal stools.  I do not believe she has any acute factious process, more likely inflammation.  She is status postcholecystectomy, with low suspicion of acute stump cholecystitis.    1:52 AM ultimately ended up getting full care plan with pain medication and is feeling quite a bit better.  Feels comfortable with the plan for discharge home with primary care follow-up, as well as GI    I have reviewed the nursing notes. I have reviewed the findings, diagnosis, plan and need for follow up with the patient.    New Prescriptions    No medications on file       Final diagnoses:   Sickle cell pain crisis (H)   Epigastric pain       Lisa Coombs MD  [facility] EMERGENCY DEPARTMENT  4/23/2025     Lisa Coombs MD  04/24/25 0152

## 2025-04-25 ENCOUNTER — APPOINTMENT (OUTPATIENT)
Dept: LAB | Facility: CLINIC | Age: 25
End: 2025-04-25
Attending: REGISTERED NURSE
Payer: MEDICAID

## 2025-04-25 LAB — BACTERIA UR CULT: NORMAL

## 2025-04-26 ENCOUNTER — HOSPITAL ENCOUNTER (EMERGENCY)
Facility: CLINIC | Age: 25
Discharge: HOME | End: 2025-04-26
Attending: EMERGENCY MEDICINE | Admitting: EMERGENCY MEDICINE
Payer: MEDICAID

## 2025-04-26 VITALS
DIASTOLIC BLOOD PRESSURE: 98 MMHG | TEMPERATURE: 97.6 F | RESPIRATION RATE: 18 BRPM | OXYGEN SATURATION: 95 % | SYSTOLIC BLOOD PRESSURE: 130 MMHG | HEART RATE: 89 BPM

## 2025-04-26 DIAGNOSIS — M79.645 PAIN OF FINGER OF LEFT HAND: ICD-10-CM

## 2025-04-26 DIAGNOSIS — R10.13 EPIGASTRIC PAIN: ICD-10-CM

## 2025-04-26 PROCEDURE — 250N000013 HC RX MED GY IP 250 OP 250 PS 637: Performed by: EMERGENCY MEDICINE

## 2025-04-26 PROCEDURE — 99284 EMERGENCY DEPT VISIT MOD MDM: CPT | Mod: 25 | Performed by: EMERGENCY MEDICINE

## 2025-04-26 PROCEDURE — 250N000011 HC RX IP 250 OP 636: Performed by: EMERGENCY MEDICINE

## 2025-04-26 PROCEDURE — 99284 EMERGENCY DEPT VISIT MOD MDM: CPT | Performed by: EMERGENCY MEDICINE

## 2025-04-26 PROCEDURE — 96374 THER/PROPH/DIAG INJ IV PUSH: CPT | Performed by: EMERGENCY MEDICINE

## 2025-04-26 RX ORDER — SUCRALFATE 1 G/1
1 TABLET ORAL ONCE
Status: COMPLETED | OUTPATIENT
Start: 2025-04-26 | End: 2025-04-26

## 2025-04-26 RX ORDER — AMOXICILLIN AND CLAVULANATE POTASSIUM 500; 125 MG/1; MG/1
1 TABLET, FILM COATED ORAL 3 TIMES DAILY
Qty: 21 TABLET | Refills: 0 | Status: SHIPPED | OUTPATIENT
Start: 2025-04-26 | End: 2025-05-03

## 2025-04-26 RX ORDER — HYDROMORPHONE HYDROCHLORIDE 1 MG/ML
1 INJECTION, SOLUTION INTRAMUSCULAR; INTRAVENOUS; SUBCUTANEOUS ONCE
Status: COMPLETED | OUTPATIENT
Start: 2025-04-26 | End: 2025-04-26

## 2025-04-26 RX ORDER — SUCRALFATE 1 G/1
1 TABLET ORAL 4 TIMES DAILY
Qty: 28 TABLET | Refills: 0 | Status: SHIPPED | OUTPATIENT
Start: 2025-04-26 | End: 2025-05-03

## 2025-04-26 RX ORDER — PANTOPRAZOLE SODIUM 40 MG/1
40 TABLET, DELAYED RELEASE ORAL ONCE
Status: COMPLETED | OUTPATIENT
Start: 2025-04-26 | End: 2025-04-26

## 2025-04-26 RX ORDER — HEPARIN SODIUM (PORCINE) LOCK FLUSH IV SOLN 100 UNIT/ML 100 UNIT/ML
5 SOLUTION INTRAVENOUS ONCE
Status: COMPLETED | OUTPATIENT
Start: 2025-04-26 | End: 2025-04-26

## 2025-04-26 RX ORDER — PANTOPRAZOLE SODIUM 40 MG/1
40 TABLET, DELAYED RELEASE ORAL DAILY
Qty: 30 TABLET | Refills: 0 | Status: SHIPPED | OUTPATIENT
Start: 2025-04-26 | End: 2025-05-26

## 2025-04-26 RX ADMIN — HEPARIN 5 ML: 100 SYRINGE at 22:58

## 2025-04-26 RX ADMIN — HYDROMORPHONE HYDROCHLORIDE 1 MG: 1 INJECTION, SOLUTION INTRAMUSCULAR; INTRAVENOUS; SUBCUTANEOUS at 21:16

## 2025-04-26 RX ADMIN — PANTOPRAZOLE SODIUM 40 MG: 40 TABLET, DELAYED RELEASE ORAL at 20:40

## 2025-04-26 RX ADMIN — SUCRALFATE 1 G: 1 TABLET ORAL at 20:49

## 2025-04-26 RX ADMIN — AMOXICILLIN AND CLAVULANATE POTASSIUM 1 TABLET: 875; 125 TABLET, FILM COATED ORAL at 20:40

## 2025-04-26 ASSESSMENT — ACTIVITIES OF DAILY LIVING (ADL)
ADLS_ACUITY_SCORE: 58

## 2025-04-26 NOTE — ED TRIAGE NOTES
Pt presents with c/o sickle cell pain. Pt states had flair up Monday, pain lessened but today the pain increased. Has tried hot showers, staying hydrated, and taking home pain medications. All over body pain, 8/10     Triage Assessment     Row Name 02/23/23 2123       Triage Assessment (Adult)    Airway WDL WDL       Respiratory WDL    Respiratory WDL WDL       Skin Circulation/Temperature WDL    Skin Circulation/Temperature WDL WDL       Cardiac WDL    Cardiac WDL WDL       Peripheral/Neurovascular WDL    Peripheral Neurovascular WDL WDL       Cognitive/Neuro/Behavioral WDL    Cognitive/Neuro/Behavioral WDL WDL       Diana Coma Scale    Best Eye Response 4-->(E4) spontaneous    Best Motor Response 6-->(M6) obeys commands    Best Verbal Response 5-->(V5) oriented    Diana Coma Scale Score 15               No

## 2025-04-27 ENCOUNTER — HOSPITAL ENCOUNTER (EMERGENCY)
Facility: CLINIC | Age: 25
Discharge: HOME | End: 2025-04-27
Attending: EMERGENCY MEDICINE | Admitting: EMERGENCY MEDICINE
Payer: MEDICAID

## 2025-04-27 VITALS
OXYGEN SATURATION: 98 % | DIASTOLIC BLOOD PRESSURE: 65 MMHG | SYSTOLIC BLOOD PRESSURE: 111 MMHG | HEART RATE: 89 BPM | RESPIRATION RATE: 18 BRPM | TEMPERATURE: 98.3 F

## 2025-04-27 DIAGNOSIS — D57.00 SICKLE CELL PAIN CRISIS (H): ICD-10-CM

## 2025-04-27 PROCEDURE — 96374 THER/PROPH/DIAG INJ IV PUSH: CPT | Performed by: EMERGENCY MEDICINE

## 2025-04-27 PROCEDURE — 99285 EMERGENCY DEPT VISIT HI MDM: CPT | Mod: 25 | Performed by: EMERGENCY MEDICINE

## 2025-04-27 PROCEDURE — 96375 TX/PRO/DX INJ NEW DRUG ADDON: CPT | Performed by: EMERGENCY MEDICINE

## 2025-04-27 PROCEDURE — 99285 EMERGENCY DEPT VISIT HI MDM: CPT | Performed by: EMERGENCY MEDICINE

## 2025-04-27 PROCEDURE — 96361 HYDRATE IV INFUSION ADD-ON: CPT | Performed by: EMERGENCY MEDICINE

## 2025-04-27 PROCEDURE — 96376 TX/PRO/DX INJ SAME DRUG ADON: CPT | Performed by: EMERGENCY MEDICINE

## 2025-04-27 PROCEDURE — 258N000003 HC RX IP 258 OP 636: Performed by: EMERGENCY MEDICINE

## 2025-04-27 PROCEDURE — 250N000011 HC RX IP 250 OP 636: Mod: JZ | Performed by: EMERGENCY MEDICINE

## 2025-04-27 RX ORDER — KETOROLAC TROMETHAMINE 30 MG/ML
30 INJECTION, SOLUTION INTRAMUSCULAR; INTRAVENOUS ONCE
Status: COMPLETED | OUTPATIENT
Start: 2025-04-27 | End: 2025-04-27

## 2025-04-27 RX ORDER — HYDROMORPHONE HYDROCHLORIDE 1 MG/ML
1 INJECTION, SOLUTION INTRAMUSCULAR; INTRAVENOUS; SUBCUTANEOUS
Status: COMPLETED | OUTPATIENT
Start: 2025-04-27 | End: 2025-04-27

## 2025-04-27 RX ORDER — ONDANSETRON 2 MG/ML
4 INJECTION INTRAMUSCULAR; INTRAVENOUS ONCE
Status: COMPLETED | OUTPATIENT
Start: 2025-04-27 | End: 2025-04-27

## 2025-04-27 RX ADMIN — HYDROMORPHONE HYDROCHLORIDE 1 MG: 1 INJECTION, SOLUTION INTRAMUSCULAR; INTRAVENOUS; SUBCUTANEOUS at 06:00

## 2025-04-27 RX ADMIN — KETOROLAC TROMETHAMINE 30 MG: 30 INJECTION, SOLUTION INTRAMUSCULAR at 04:00

## 2025-04-27 RX ADMIN — HYDROMORPHONE HYDROCHLORIDE 1 MG: 1 INJECTION, SOLUTION INTRAMUSCULAR; INTRAVENOUS; SUBCUTANEOUS at 03:57

## 2025-04-27 RX ADMIN — SODIUM CHLORIDE 1000 ML: 0.9 INJECTION, SOLUTION INTRAVENOUS at 04:00

## 2025-04-27 RX ADMIN — HYDROMORPHONE HYDROCHLORIDE 1 MG: 1 INJECTION, SOLUTION INTRAMUSCULAR; INTRAVENOUS; SUBCUTANEOUS at 05:00

## 2025-04-27 RX ADMIN — ONDANSETRON 4 MG: 2 INJECTION INTRAMUSCULAR; INTRAVENOUS at 03:59

## 2025-04-27 ASSESSMENT — ACTIVITIES OF DAILY LIVING (ADL)
ADLS_ACUITY_SCORE: 58

## 2025-04-27 NOTE — DISCHARGE INSTRUCTIONS
Fill your prescriptions and take as directed    Avoid Advil ibuprofen or Naprosyn as these will worsen your epigastric abdominal pain.    Start the antibiotic and start soaking your left index finger for 30 minutes at a time 2-3 times daily over the next 3 days.    Please make an appointment to follow up with Your Primary Care Provider in 1 week for recheck.    Follow-up with gastroenterology for your endoscopy as planned.

## 2025-04-27 NOTE — ED TRIAGE NOTES
"Pt reporting abdominal pain, one emesis episode one hour before arrival. Pt also reports pain in L index finger, able to move finger, circulation WDL, but unable to lift or grasp. Pt reports finger is \"more swollen than normal\".         "

## 2025-04-27 NOTE — ED PROVIDER NOTES
ED PROVIDER NOTE  [facility]  [facility] [place]      History     Chief Complaint   Patient presents with    Hand Pain     L index finger    Abdominal Pain     generalized     HPI  Jennifer Cervantes is a 25 year old female sickle cell patient who Alejandro presents to the ER with complaints of epigastric pain.  Patient states she was seen in the ER a couple weeks ago for the same symptoms and was set up with gastroenterology for endoscopy but this is not scheduled until June.  Patient states that she was given some medications in the ER but not sent home with a PPI.  Patient denies any melena or bright blood per rectum.  Patient denies any vomiting.  Patient also complains of a sore swollen left index finger near the tip of the finger that she has had for the last 2 days.    Previous ER visit was reviewed; previous abdominal CT reveals no evidence of gallstones.      I have reviewed the Medications, Allergies, Past Medical and Surgical History, and Social History in the [employer] system.    Past Medical History:   Diagnosis Date    Anxiety     Bleeding disorder     Blood clotting disorder     Cerebral infarction (H) 2015    Cognitive developmental delay     low IQ. 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 side (H)     right hand contractures    Iron overload due to repeated red blood cell transfusions     Migraines     Multiple subsegmental pulmonary emboli without acute cor pulmonale (H) 02/01/2021    Oppositional defiant behavior     Presence of intrauterine contraceptive device 2/18/2020    Superficial venous thrombosis of arm, right 03/25/2021    Uncomplicated asthma        Past Surgical History:   Procedure Laterality Date    AS INSERT TUNNELED CV 2 CATH W/O PORT/PUMP      CHOLECYSTECTOMY      CV RIGHT HEART CATH MEASUREMENTS RECORDED N/A 11/18/2021    Procedure: Right Heart Cath;   Surgeon: Jackson Stauffer MD;  Location:  HEART CARDIAC CATH LAB    INSERT PORT VASCULAR ACCESS Left 4/21/2021    Procedure: INSERTION, VASCULAR ACCESS PORT (NOT SURE ON SIDE UNTIL REMOVAL);  Surgeon: Rajan More MD;  Location: [facility] OR    IR CHEST PORT PLACEMENT > 5 YRS OF AGE  4/21/2021    IR CVC NON TUNNEL LINE REMOVAL  5/6/2021    IR CVC NON TUNNEL PLACEMENT > 5 YRS  04/07/2020    IR CVC NON TUNNEL PLACEMENT > 5 YRS  4/30/2021    IR CVC NON TUNNEL PLACEMENT > 5 YRS  9/7/2022    IR PORT REMOVAL LEFT  4/21/2021    REMOVE PORT VASCULAR ACCESS Left 4/21/2021    Procedure: REMOVAL, VASCULAR ACCESS PORT LEFT;  Surgeon: Rajan More MD;  Location: [facility] OR    REPAIR TENDON ELBOW Right 10/02/2019    Procedure: Right Elbow Flexor Lengthening, Flexor Pronator Slide Of Wrist and Finger, Thumb Adductor Lengthening;  Surgeon: Anai rFanco MD;  Location: [facility] OR    TONSILLECTOMY Bilateral 10/02/2019    Procedure: Bilateral Tonsillectomy;  Surgeon: Farhana Guy MD;  Location: [facility] OR    ZZC BREAST REDUCTION (INCLUDES LIPO) TIER 3 Bilateral 04/23/2019         Dose / Directions   albuterol (2.5 MG/3ML) 0.083% neb solution  Commonly known as: PROVENTIL      Dose: 2.5 mg  Take 1 vial (2.5 mg) by nebulization every 6 hours as needed for shortness of breath, wheezing or cough.  Quantity: 90 mL  Refills: 0     aspirin 81 MG chewable tablet  Commonly known as: ASA  Used for: Superficial venous thrombosis of arm, right      Dose: 81 mg  Take 1 tablet (81 mg) by mouth 2 times daily  Quantity: 60 tablet  Refills: 11     budesonide-formoterol 160-4.5 MCG/ACT Inhaler  Commonly known as: SYMBICORT/BREYNA  Used for: Moderate persistent asthma, unspecified whether complicated      Inhale 2 puffs twice daily plus 1-2 puffs as needed. May use up to 12 puffs per day.  Quantity: 20.4 g  Refills: 11     deferasirox 360 MG tablet  Commonly known as: JADENU  Used for: Iron overload due to repeated red blood cell  transfusions      Dose: 1,440 mg  Take 4 tablets (1,440 mg) by mouth daily.  Quantity: 120 tablet  Refills: 11     Deferiprone (Twice Daily) 1000 MG Tabs  Used for: Iron overload due to repeated red blood cell transfusions      Dose: 2,000 mg  Take 2,000 mg by mouth 2 times daily.  Quantity: 150 tablet  Refills: 3     diclofenac 1 % topical gel  Commonly known as: VOLTAREN  Used for: Acute pain of left knee      Dose: 4 g  Apply 4 g topically 4 times daily as needed (moderate knee pain).  Quantity: 350 g  Refills: 1     diphenhydrAMINE 50 MG capsule  Commonly known as: BENADRYL  Used for: Sickle cell pain crisis (H)      Administer 12  hours pre - IV contrast injection and 2 hours prior to contrast. Patient must have a .  Quantity: 2 capsule  Refills: 0     EPINEPHrine 0.3 MG/0.3ML injection 2-pack  Commonly known as: ANY BX GENERIC EQUIV  Used for: Anaphylaxis, sequela      Dose: 0.3 mg  Inject 0.3 mLs (0.3 mg) into the muscle as needed for anaphylaxis.  Quantity: 1 each  Refills: 1     gabapentin 300 MG capsule  Commonly known as: NEURONTIN  Used for: Other chronic pain      Dose: 300 mg  Take 1 capsule (300 mg) by mouth 3 times daily. Take PO 1 pill in evening (300 mg) TID to start. If tolerated, increase by 300 mg in morning or lunch every few days, updating your doctor's office in time to get refills. The maximum dose is 900 mg TID.  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 Okay to take scheduled while in pain).  Quantity: 60 tablet  Refills: 1     methylPREDNISolone 32 MG tablet  Commonly known as: Medrol  Used for: Sickle cell pain crisis (H)      Take 1 tab 12 hours before appointment and 1 tab 2 hours prior to the procedure with IV contrast.  Quantity: 2 tablet  Refills: 0     naloxone 4 MG/0.1ML nasal spray  Commonly known as: NARCAN      Dose: 4 mg  Spray 4 mg into one nostril alternating nostrils as needed for opioid reversal. every 2-3 minutes until assistance arrives  Quantity: 0.2 mL  Refills: 0     naproxen 500 MG tablet  Commonly known as: NAPROSYN  Used for: Pain of left upper arm      Dose: 750 mg  Take 1.5 tablets (750 mg) by mouth daily.  Quantity: 5 tablet  Refills: 0     oxyCODONE IR 10 MG tablet  Commonly known as: ROXICODONE  Used for: Sickle cell disease with crisis (H)      Dose: 10 mg  Take 1 tablet (10 mg) by mouth every 6 hours as needed for moderate to severe pain. (Goal of less than 4 per day)  Quantity: 12 tablet  Refills: 0         Past medical history, past surgical history, medications, and allergies were reviewed with the patient. Additional pertinent items: None    Family History   Problem Relation Age of Onset    Sickle Cell Trait Mother     Hypertension Mother     Asthma Mother     Sickle Cell Trait Father     Glaucoma No family hx of     Macular Degeneration No family hx of     Diabetes No family hx of     Gout No family hx of        Social History     Tobacco Use    Smoking status: Never     Passive exposure: Never    Smokeless tobacco: Never   Substance Use Topics    Alcohol use: Not Currently     Alcohol/week: 0.0 standard drinks of alcohol     Social history was reviewed with the patient. Additional pertinent items: None    Allergies   Allergen Reactions    Contrast Dye Angioedema     Hives and breathing issues    Fish-Derived Products Hives    Seafood Hives    Adhesive Tape Hives     Primipore dressing causes hives     Gadolinium     Iodinated Contrast Media        Review of Systems  A medically appropriate review of systems was performed with pertinent positives and negatives noted in the HPI, and all other systems negative.    Physical Exam   BP: (!) 148/85  Pulse: 108  Temp: 98.2  F (36.8  C)  Resp: 17  SpO2: 92 %      Physical Exam  Vitals and nursing note reviewed.   Constitutional:       General: She is not in acute distress.     Appearance: She is not ill-appearing, toxic-appearing or diaphoretic.   HENT:      Head: Atraumatic.   Eyes:      Extraocular Movements: Extraocular movements intact.      Pupils: Pupils are equal, round, and reactive to light.   Pulmonary:      Effort: No respiratory distress.   Abdominal:      Comments: Abdomen is soft and basically benign throughout   Skin:     Comments: Patient's left index finger has evidence of some tenderness along the ulnar aspect of the nailbed of the index finger suggestive of a early paronychia   Neurological:      General: No focal deficit present.      Mental Status: She is alert and oriented to person, place, and time.   Psychiatric:         Mood and Affect: Mood normal.         ED Course     Orders Placed This Encounter   Procedures    Peripheral IV: Standard    Contact Isolation     Medications   HYDROmorphone (PF) (DILAUDID) injection 1 mg (has no administration in time range)   pantoprazole (PROTONIX) EC tablet 40 mg (40 mg Oral $Given 4/26/25 2040)   sucralfate (CARAFATE) tablet 1 g (1 g Oral $Given 4/26/25 2049)   amoxicillin-clavulanate (AUGMENTIN) 875-125 MG per tablet 1 tablet (1 tablet Oral $Given 4/26/25 2040)       Procedures            Critical care was not performed.     Medical Decision Making  The patient's presentation was of moderate complexity (a chronic illness mild to moderate exacerbation, progression, or side effect of treatment).    The patient's evaluation involved:  review of 3+ test result(s) ordered prior to this encounter (see epic)    The  patient's management necessitated high risk (a parenteral controlled substance).      Assessments & Plan (with Medical Decision Making)     I have reviewed the nursing notes.    Before leaving the patient wished to have some of her Dilaudid that she gets on her normal treatment plan but at this time the patient will be put on a PPI with Carafate and will be told to follow-up with her primary care    I have reviewed the findings, diagnosis, plan and need for follow up with the patient.      New Prescriptions    AMOXICILLIN-CLAVULANATE (AUGMENTIN) 500-125 MG TABLET    Take 1 tablet by mouth 3 times daily for 7 days.    PANTOPRAZOLE (PROTONIX) 40 MG EC TABLET    Take 1 tablet (40 mg) by mouth daily for 30 doses.    SUCRALFATE (CARAFATE) 1 GM TABLET    Take 1 tablet (1 g) by mouth 4 times daily for 7 days.       Final diagnoses:   Epigastric pain   Pain of finger of left hand - index finger with early paronychia     Fill your prescriptions and take as directed    Avoid Advil ibuprofen or Naprosyn as these will worsen your epigastric abdominal pain.    Start the antibiotic and start soaking your left index finger for 30 minutes at a time 2-3 times daily over the next 3 days.    Please make an appointment to follow up with Your Primary Care Provider in 1 week for recheck.    Follow-up with gastroenterology for your endoscopy as planned.    Routine discharge instructions were given for this diagnosis        CLAIRE KEARNS MD    4/26/2025   [facility] EMERGENCY DEPARTMENT       Claire Kearns MD  04/26/25 2057

## 2025-04-27 NOTE — ED TRIAGE NOTES
Pt here earlier tonight, pain not well controlled when discharged, pain worsening when she went home. Pain now traveling down pt's legs. Experiencing nausea, no emesis.

## 2025-04-28 ENCOUNTER — NURSE TRIAGE (OUTPATIENT)
Dept: ONCOLOGY | Facility: CLINIC | Age: 25
End: 2025-04-28
Payer: MEDICAID

## 2025-04-28 NOTE — TELEPHONE ENCOUNTER
Oncology Nurse Triage - Sickle Cell Pain Crisis:    Situation: Jennifer  calling about Sickle Cell Pain Crisis, requesting to be added on for IV fluids and pain medicine    Background:     Patient's last infusion was 04/27/25 ED  Last clinic visit date:04/25/25 w/Patricia Mantilla  Does patient have active treatment plan? Yes    Assessment of Symptoms:  Onset/Duration of symptoms: 1 day    Is it typical sickle cell pain? Yes  Location: everywhere  Character: dull  Intensity: 9/10    Any radiation of pain, numbness, tingling, weakness, warmth, swelling, discoloration of arms or legs?No    Fever? No    Chest Pain Present: No    Shortness of breath: No    Other home therapies tried: HEAT/HEATING PAD and WARM BATH     Last home medication taken and when: Saturday before she reported to ED    Any Refills Needed?: No    Does patient have transportation & length of time to get to clinic: Yes, if something opens up early morning, if after 12pm, she will need assistance scheduling transportation.      Recommendations:   Reviewed with pt  if she does not hear from the infusion center by 2pm then you will not be able to get in for an infusion today. If symptoms worsen while waiting for call back, and/or you experience fever, chills, SOB, chest pain, cough, n/v, dizziness, numbness, swelling, discoloration of extremities, then seek emergency evaluation in Emergency Department.     0957 Fredo page to   0957  approving adding pt to wait list.    Added to infusion wait list for IVF/pain meds per protocol.

## 2025-04-29 ENCOUNTER — PATIENT OUTREACH (OUTPATIENT)
Dept: CARE COORDINATION | Facility: CLINIC | Age: 25
End: 2025-04-29
Payer: MEDICAID

## 2025-04-29 ENCOUNTER — INFUSION THERAPY VISIT (OUTPATIENT)
Dept: TRANSPLANT | Facility: CLINIC | Age: 25
End: 2025-04-29
Attending: REGISTERED NURSE
Payer: MEDICAID

## 2025-04-29 ENCOUNTER — NURSE TRIAGE (OUTPATIENT)
Dept: ONCOLOGY | Facility: CLINIC | Age: 25
End: 2025-04-29
Payer: MEDICAID

## 2025-04-29 VITALS
TEMPERATURE: 98.6 F | RESPIRATION RATE: 18 BRPM | SYSTOLIC BLOOD PRESSURE: 119 MMHG | HEART RATE: 99 BPM | DIASTOLIC BLOOD PRESSURE: 78 MMHG | OXYGEN SATURATION: 92 %

## 2025-04-29 DIAGNOSIS — D57.00 SICKLE CELL PAIN CRISIS (H): Primary | ICD-10-CM

## 2025-04-29 DIAGNOSIS — G81.10 SPASTIC HEMIPLEGIA, UNSPECIFIED ETIOLOGY, UNSPECIFIED LATERALITY (H): ICD-10-CM

## 2025-04-29 PROCEDURE — 258N000003 HC RX IP 258 OP 636: Performed by: PEDIATRICS

## 2025-04-29 PROCEDURE — 250N000013 HC RX MED GY IP 250 OP 250 PS 637: Performed by: PEDIATRICS

## 2025-04-29 PROCEDURE — 96374 THER/PROPH/DIAG INJ IV PUSH: CPT

## 2025-04-29 PROCEDURE — 96376 TX/PRO/DX INJ SAME DRUG ADON: CPT

## 2025-04-29 PROCEDURE — 96375 TX/PRO/DX INJ NEW DRUG ADDON: CPT

## 2025-04-29 PROCEDURE — 96361 HYDRATE IV INFUSION ADD-ON: CPT

## 2025-04-29 PROCEDURE — 250N000011 HC RX IP 250 OP 636: Mod: JZ | Performed by: PEDIATRICS

## 2025-04-29 RX ORDER — DIPHENHYDRAMINE HCL 25 MG
50 CAPSULE ORAL
Status: COMPLETED | OUTPATIENT
Start: 2025-04-29 | End: 2025-04-29

## 2025-04-29 RX ORDER — HEPARIN SODIUM,PORCINE 10 UNIT/ML
5 VIAL (ML) INTRAVENOUS
OUTPATIENT
Start: 2025-07-01

## 2025-04-29 RX ORDER — KETOROLAC TROMETHAMINE 30 MG/ML
30 INJECTION, SOLUTION INTRAMUSCULAR; INTRAVENOUS ONCE
Status: COMPLETED | OUTPATIENT
Start: 2025-04-29 | End: 2025-04-29

## 2025-04-29 RX ORDER — ONDANSETRON 4 MG/1
8 TABLET, FILM COATED ORAL
Start: 2025-07-01

## 2025-04-29 RX ORDER — ONDANSETRON 4 MG/1
8 TABLET, ORALLY DISINTEGRATING ORAL
Status: COMPLETED | OUTPATIENT
Start: 2025-04-29 | End: 2025-04-29

## 2025-04-29 RX ORDER — DIPHENHYDRAMINE HCL 25 MG
50 CAPSULE ORAL
Start: 2025-07-01

## 2025-04-29 RX ORDER — KETOROLAC TROMETHAMINE 30 MG/ML
30 INJECTION, SOLUTION INTRAMUSCULAR; INTRAVENOUS ONCE
Start: 2025-07-01 | End: 2025-07-01

## 2025-04-29 RX ORDER — ONDANSETRON 2 MG/ML
8 INJECTION INTRAMUSCULAR; INTRAVENOUS EVERY 6 HOURS PRN
Start: 2025-07-01

## 2025-04-29 RX ORDER — HEPARIN SODIUM (PORCINE) LOCK FLUSH IV SOLN 100 UNIT/ML 100 UNIT/ML
5 SOLUTION INTRAVENOUS
OUTPATIENT
Start: 2025-07-01

## 2025-04-29 RX ORDER — ONDANSETRON 2 MG/ML
8 INJECTION INTRAMUSCULAR; INTRAVENOUS EVERY 6 HOURS PRN
Status: DISCONTINUED | OUTPATIENT
Start: 2025-04-29 | End: 2025-04-29 | Stop reason: HOSPADM

## 2025-04-29 RX ADMIN — KETOROLAC TROMETHAMINE 30 MG: 30 INJECTION, SOLUTION INTRAMUSCULAR; INTRAVENOUS at 13:41

## 2025-04-29 RX ADMIN — DIPHENHYDRAMINE HYDROCHLORIDE 50 MG: 25 CAPSULE ORAL at 13:36

## 2025-04-29 RX ADMIN — ONDANSETRON 8 MG: 2 INJECTION INTRAMUSCULAR; INTRAVENOUS at 13:36

## 2025-04-29 RX ADMIN — SODIUM CHLORIDE, POTASSIUM CHLORIDE, SODIUM LACTATE AND CALCIUM CHLORIDE 1000 ML: 600; 310; 30; 20 INJECTION, SOLUTION INTRAVENOUS at 13:28

## 2025-04-29 RX ADMIN — HYDROMORPHONE HYDROCHLORIDE 1 MG: 1 INJECTION, SOLUTION INTRAMUSCULAR; INTRAVENOUS; SUBCUTANEOUS at 15:32

## 2025-04-29 RX ADMIN — HYDROMORPHONE HYDROCHLORIDE 1 MG: 1 INJECTION, SOLUTION INTRAMUSCULAR; INTRAVENOUS; SUBCUTANEOUS at 14:37

## 2025-04-29 RX ADMIN — HYDROMORPHONE HYDROCHLORIDE 1 MG: 1 INJECTION, SOLUTION INTRAMUSCULAR; INTRAVENOUS; SUBCUTANEOUS at 13:37

## 2025-04-29 ASSESSMENT — PAIN SCALES - GENERAL: PAINLEVEL_OUTOF10: SEVERE PAIN (8)

## 2025-04-29 NOTE — PROGRESS NOTES
Infusion Nursing Note:  Jennifer Cervantes presents today for IVF/PM.    Patient seen by provider today: No   present during visit today: Not Applicable.    Note: No labs/no provider today. Patient presents feeling well other than her SCC pain, which she rates an 8/10. 1L LR bolus given over 2 hours. Given 8mg IVP Zofran, 50mg PO Benadryl, 30mg IVP Toradol, and 1mg IVP Dilaudid Q1HR for max of 3 doses. Patient reported improvement in pain to 4/10.    Intravenous Access:  Implanted Port.    Treatment Conditions:  Not Applicable.    Post Infusion Assessment:  Patient tolerated infusion without incident.     Discharge Plan:   Patient discharged in stable condition accompanied by: self.  Departure Mode: Ambulatory.    Allison Wiggins RN

## 2025-04-29 NOTE — TELEPHONE ENCOUNTER
Oncology Nurse Triage - Sickle Cell Pain Crisis:  Situation: Jennifer  calling about Sickle Cell Pain Crisis, requesting to be added on for IV fluids and pain medicine    Background:   Patient's last infusion was 4/27/25 in ED  Last clinic visit date:4/25/25 with Patricia Mantilla CNP  Does patient have active treatment plan? Yes    Assessment of Symptoms:  Onset/Duration of symptoms: 2 day    Is it typical sickle cell pain? Yes  Location: all over, can't pinpoint it  Character: Sharp and Dull ache  Intensity: 9/10    Any radiation of pain, numbness, tingling, weakness, warmth, swelling, discoloration of arms or legs?No    Fever? No  Chest Pain Present: No  Shortness of breath: No    Other home therapies tried: HEAT/HEATING PAD and WARM BATH     Last home medication taken and when: No pain meds since yesterday at 2200; not out of medication--when feels getting enough dilaudid try to take it slow with the oxycodone    Any Refills Needed?: No    Does patient have transportation & length of time to get to clinic: Yes if the appointment is within the next few hours otherwise will use medical transportation.    Recommendations:   If you do not hear from the infusion center by 2pm then you will not be able to get in for an infusion today. If symptoms worsen while waiting for call back, and/or you experience fever, chills, SOB, chest pain, cough, n/v, dizziness, numbness, swelling, discoloration of extremities, then seek emergency evaluation in Emergency Department.     Pt voiced understanding.  Added to infusion wait list.    0756: BMT can offer apt at 1330 to Jennifer.  0758: Pt confirmed she can make the apt at 1330. She will need transportation to and from apt.  Updated infusion charge nurse.   0801: Message sent to CCOD to schedule pt.  0804: Message sent to SW to assist with transportation.     Message routed to Care Team.

## 2025-04-29 NOTE — PROGRESS NOTES
Ambulatory Care Management- Transportation Support  Oncology Clinic     Data/Intervention:  Patient Name: Jennifer Cervantes     /Age: 2000 (24 year old)     CCRC team member collaborated with following Oncology SW regarding this request: From Nurse Triage for transportation      Assessment:  CHW/CTA called [employer] to arrange medical appointment transportation for pickup at 12:30pm and a will call ride home. Patient will need to call when ready for return ride home [phone] for a pickup.   Plan:  Left vm of the transportation plan.  available to assist with any other needs.         Isaura OTTO Community Health Worker  Clinic Care Coordination  [facility]  Phone: [phone]

## 2025-04-30 DIAGNOSIS — D57.00 SICKLE CELL DISEASE WITH CRISIS (H): ICD-10-CM

## 2025-04-30 RX ORDER — OXYCODONE HYDROCHLORIDE 10 MG/1
10 TABLET ORAL EVERY 6 HOURS PRN
Qty: 12 TABLET | Refills: 0 | Status: SHIPPED | OUTPATIENT
Start: 2025-05-01

## 2025-04-30 NOTE — TELEPHONE ENCOUNTER
Narcotic Refill Request  Medication(s) requested:  Oxycdone 10mg tab  Person Requesting Refill: Jennifer- requesting refill for Thursday 5/1 fill  What pain is the medication treating: sickle cell pain  How is the medication being taken?: 1 tab q4-6h  Does pt have enough for today? Yes, will be out early morning tomorrow.   Is pain being adequately controlled on the current regimen?: yes  Experiencing any side effects from medication?: no    Date of most recent appointment:  4/25/25 w/ Patricia Mantilla   Any No Show Visits: none  Next appointment:   5/23/25  Last fill date and by whom:  4/23/25 by Patricia Mantilla    Reviewed: no access    Send to provider: Patricia Mantilla and JOE McgeeCC.

## 2025-05-01 ENCOUNTER — NURSE TRIAGE (OUTPATIENT)
Dept: ONCOLOGY | Facility: CLINIC | Age: 25
End: 2025-05-01

## 2025-05-01 ENCOUNTER — OFFICE VISIT (OUTPATIENT)
Dept: ONCOLOGY | Facility: CLINIC | Age: 25
End: 2025-05-01
Attending: STUDENT IN AN ORGANIZED HEALTH CARE EDUCATION/TRAINING PROGRAM
Payer: MEDICAID

## 2025-05-01 VITALS
WEIGHT: 158 LBS | BODY MASS INDEX: 27.12 KG/M2 | TEMPERATURE: 98.5 F | RESPIRATION RATE: 16 BRPM | HEART RATE: 86 BPM | OXYGEN SATURATION: 95 %

## 2025-05-01 DIAGNOSIS — D57.00 SICKLE CELL DISEASE WITH CRISIS (H): ICD-10-CM

## 2025-05-01 DIAGNOSIS — G81.10 SPASTIC HEMIPLEGIA, UNSPECIFIED ETIOLOGY, UNSPECIFIED LATERALITY (H): Primary | ICD-10-CM

## 2025-05-01 DIAGNOSIS — I69.351 HEMIPLEGIA AND HEMIPARESIS FOLLOWING CEREBRAL INFARCTION AFFECTING RIGHT DOMINANT SIDE (H): ICD-10-CM

## 2025-05-01 PROCEDURE — 250N000020 HC RX IP 250 OP 636 J0585: Mod: JZ | Performed by: PEDIATRICS

## 2025-05-01 PROCEDURE — 95874 GUIDE NERV DESTR NEEDLE EMG: CPT | Performed by: STUDENT IN AN ORGANIZED HEALTH CARE EDUCATION/TRAINING PROGRAM

## 2025-05-01 RX ORDER — ONDANSETRON 2 MG/ML
8 INJECTION INTRAMUSCULAR; INTRAVENOUS EVERY 6 HOURS PRN
Start: 2025-07-01

## 2025-05-01 RX ORDER — HEPARIN SODIUM,PORCINE 10 UNIT/ML
5 VIAL (ML) INTRAVENOUS
OUTPATIENT
Start: 2025-07-01

## 2025-05-01 RX ORDER — ONDANSETRON 4 MG/1
8 TABLET, FILM COATED ORAL
Start: 2025-07-01

## 2025-05-01 RX ORDER — KETOROLAC TROMETHAMINE 30 MG/ML
30 INJECTION, SOLUTION INTRAMUSCULAR; INTRAVENOUS ONCE
Start: 2025-07-01 | End: 2025-07-01

## 2025-05-01 RX ORDER — DIPHENHYDRAMINE HCL 25 MG
50 CAPSULE ORAL
Start: 2025-07-01

## 2025-05-01 RX ORDER — HEPARIN SODIUM (PORCINE) LOCK FLUSH IV SOLN 100 UNIT/ML 100 UNIT/ML
5 SOLUTION INTRAVENOUS
OUTPATIENT
Start: 2025-07-01

## 2025-05-01 RX ADMIN — ONABOTULINUMTOXINA 300 UNITS: 100 INJECTION, POWDER, LYOPHILIZED, FOR SOLUTION INTRADERMAL; INTRAMUSCULAR at 08:26

## 2025-05-01 ASSESSMENT — PAIN SCALES - GENERAL: PAINLEVEL_OUTOF10: SEVERE PAIN (9)

## 2025-05-01 NOTE — LETTER
5/1/2025      Jennifer Cervantes  [address]  [facility] [address]      Dear Colleague,    Thank you for referring your patient, Jennifer Cervantes, to the [facility]. Please see a copy of my visit note below.    PM&R Botox Procedure Note    Chief Complaint: Spastic Hemiparesis    Pulse 86   Temp 98.5  F (36.9  C) (Oral)   Resp 16   Wt 71.7 kg (158 lb)   SpO2 95%   BMI 27.12 kg/m         Current Outpatient Medications:      albuterol (PROVENTIL) (2.5 MG/3ML) 0.083% neb solution, Take 1 vial (2.5 mg) by nebulization every 6 hours as needed for shortness of breath, wheezing or cough., Disp: 90 mL, Rfl: 0     amoxicillin-clavulanate (AUGMENTIN) 500-125 MG tablet, Take 1 tablet by mouth 3 times daily for 7 days., Disp: 21 tablet, Rfl: 0     aspirin (ASA) 81 MG chewable tablet, Take 1 tablet (81 mg) by mouth 2 times daily, Disp: 60 tablet, Rfl: 11     budesonide-formoterol (SYMBICORT/BREYNA) 160-4.5 MCG/ACT Inhaler, Inhale 2 puffs twice daily plus 1-2 puffs as needed. May use up to 12 puffs per day., Disp: 20.4 g, Rfl: 11     deferasirox (JADENU) 360 MG tablet, Take 4 tablets (1,440 mg) by mouth daily., Disp: 120 tablet, Rfl: 11     Deferiprone, Twice Daily, 1000 MG TABS, Take 2,000 mg by mouth 2 times daily., Disp: 150 tablet, Rfl: 3     diclofenac (VOLTAREN) 1 % topical gel, Apply 4 g topically 4 times daily as needed (moderate knee pain)., Disp: 350 g, Rfl: 1     diphenhydrAMINE (BENADRYL) 50 MG capsule, Administer 12  hours pre - IV contrast injection and 2 hours prior to contrast. Patient must have a ., Disp: 2 capsule, Rfl: 0     EPINEPHrine (ANY BX GENERIC EQUIV) 0.3 MG/0.3ML injection 2-pack, Inject 0.3 mLs (0.3 mg) into the muscle as needed for anaphylaxis., Disp: 1 each, Rfl: 1     hydroxyurea (HYDREA) 500 MG capsule, Take 6 capsules (3,000 mg) by mouth daily, Disp: 180 capsule, Rfl: 11     ibuprofen (ADVIL/MOTRIN) 800 MG tablet, Take 800 mg by mouth every 8 hours as  needed for moderate pain, Disp: , Rfl:      methocarbamol (ROBAXIN) 750 MG tablet, Take 1 tablet (750 mg) by mouth 4 times daily as needed for muscle spasms (during sickle pain crises. Okay to take scheduled while in pain)., Disp: 60 tablet, Rfl: 1     methylPREDNISolone (MEDROL) 32 MG tablet, Take 1 tab 12 hours before appointment and 1 tab 2 hours prior to the procedure with IV contrast., Disp: 2 tablet, Rfl: 0     naloxone (NARCAN) 4 MG/0.1ML nasal spray, Spray 4 mg into one nostril alternating nostrils as needed for opioid reversal. every 2-3 minutes until assistance arrives, Disp: 0.2 mL, Rfl: 0     naproxen (NAPROSYN) 500 MG tablet, Take 1.5 tablets (750 mg) by mouth daily., Disp: 5 tablet, Rfl: 0     oxyCODONE IR (ROXICODONE) 10 MG tablet, Take 1 tablet (10 mg) by mouth every 6 hours as needed for moderate to severe pain. (Goal of less than 4 per day), Disp: 12 tablet, Rfl: 0     pantoprazole (PROTONIX) 40 MG EC tablet, Take 1 tablet (40 mg) by mouth daily for 30 doses., Disp: 30 tablet, Rfl: 0     sucralfate (CARAFATE) 1 GM tablet, Take 1 tablet (1 g) by mouth 4 times daily for 7 days., Disp: 28 tablet, Rfl: 0     gabapentin (NEURONTIN) 300 MG capsule, Take 1 capsule (300 mg) by mouth 3 times daily. Take PO 1 pill in evening (300 mg) TID to start. If tolerated, increase by 300 mg in morning or lunch every few days, updating your doctor's office in time to get refills. The maximum dose is 900 mg TID. (Patient not taking: Reported on 3/17/2025), Disp: 90 capsule, Rfl: 1    Current Facility-Administered Medications:      botulinum toxin type A (BOTOX) 100 units injection 300 Units, 300 Units, Intramuscular, Q90 Days, Eric Duncan MD, 300 Units at 05/01/25 0826        Allergies   Allergen Reactions     Contrast Dye Angioedema     Hives and breathing issues     Fish-Derived Products Hives     Seafood Hives     Adhesive Tape Hives     Primipore dressing causes hives     Gadolinium      Iodinated Contrast  Media         PHYSICAL EXAM      Strength: 4+/5 with right shoulder abduction, 4/5 with right elbow flexion, unable to assess wrist flexion due to contracture. 4/5 with  strength on right. 5/5 left shoulder abduction, elbow extension, wrist extension and  strength/finger intrinsics. 4/5 with right hip flexion, 4/5 with right knee extension, 3/5 with right ankle dorsiflexion, 4+/5 with right EHL, plantar flexion. 5/5 with all muscle groups in left lower extremity.    ROM is 120 degrees with right shoulder abduction, about 130 degrees with right elbow extension.     Tone with MAS-   2/4 with right shoulder abduction   3/4 with right finger flexors/intrinsics   2/4 with right knee flexion.    Significant right wrist contracture with minimal extension.   Sensory: intact to light touch throughout all dermatomes in bilateral lower extremities.      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      Last seen 24 for last set of injections- injected total reduced at that time due to other symptoms- see last note.     Since then, she is feeling a lot better, and tightness has come up again in right arm and leg as she is did not get her usual dose last time. She continues to have significant improvement in tone improving mobility and ADLs with injections. Feels a little tighter in her right hamstring more so than in the past and also right elbow, forearm pronation and finger flexion, started to notice the tightness coming back over the last month.      RESPONSE TO PREVIOUS TREATMENT:  Significant improvement in tone but due to reduced dose for outside factors, is more tight at visit today,    BOTULINUM NEUROTOXIN INJECTION PROCEDURES:    VERIFICATION OF PATIENT IDENTIFICATION  Responsible Person:  RUIZ DENNISB: verified  Full Name: verified    INDICATIONS FOR PROCEDURES:  Jennifer Cervantes is a 21 year old patient with spasticity affecting  the right upper extremity and right lower extremity secondary to a diagnosis of sickle cell disease and previous CVA with resulting spastic hemiparesis with associated pain, loss of joint motion, loss of volitional motor control, hygiene difficulty, difficulty with activities of daily living and difficulty with ambulation and transfers.    Her baseline symptoms have been recalcitrant to oral medications and conservative therapy.  She is here today for reinjection with Botox.    GOAL OF PROCEDURE:  The goal of this procedure is to improve increase active range of motion, improve volitional motor control, decrease pain  and enhance functional independence associated with spasticity.    TOTAL DOSE ADMINISTERED:  Increased dose today based on increased tone with MAS with right elbow flexion, finger flexion.    Dose Administered:  300 units Botox (Botulinum Toxin Type A)       1:1 Dilution     Diluent Used:  Preservative Free Normal Saline  Total Volume of Diluent Used:  3.0 ml  Please see MAR for lot number and expiry    CONSENT:  The risks, benefits, and treatment options were discussed with Jennifer Cervantes and she agreed to proceed.    EQUIPMENT USED:  Needle-27mm stimulating/recording  EMG/NCS Machine    SKIN PREPARATION:  Skin preparation was performed using an alcohol wipe.    GUIDANCE DESCRIPTION:  Electro-myographic guidance was necessary throughout the procedure to accurately identify all areas of spastic muscles while avoiding injection of non-spastic muscles and underlying muscles , neighboring nerves and nearby vascular structures.     AREA/MUSCLE INJECTED:  Right biceps- 50 U at 2 sites  Right BR- 50 U  Right pronator teres- 50 U  Right FDS- 50 U  Right FCR- 40 U  Right biceps femoris- 60 U at 2 sites    RESPONSE TO PROCEDURE:  Jennifer Cervantes tolerated the procedure well and there were no immediate complications. She was allowed to recover for an appropriate period of time and was discharged home in stable  condition.     We will plan for next set of injections in 12 weeks.    Katherine Pierce MD  Physical Medicine & Rehabilitation         Again, thank you for allowing me to participate in the care of your patient.        Sincerely,        Katherine Pierce MD    Electronically signed

## 2025-05-01 NOTE — PROGRESS NOTES
PM&R Botox Procedure Note    Chief Complaint: Spastic Hemiparesis    Pulse 86   Temp 98.5  F (36.9  C) (Oral)   Resp 16   Wt 71.7 kg (158 lb)   SpO2 95%   BMI 27.12 kg/m         Current Outpatient Medications:     albuterol (PROVENTIL) (2.5 MG/3ML) 0.083% neb solution, Take 1 vial (2.5 mg) by nebulization every 6 hours as needed for shortness of breath, wheezing or cough., Disp: 90 mL, Rfl: 0    amoxicillin-clavulanate (AUGMENTIN) 500-125 MG tablet, Take 1 tablet by mouth 3 times daily for 7 days., Disp: 21 tablet, Rfl: 0    aspirin (ASA) 81 MG chewable tablet, Take 1 tablet (81 mg) by mouth 2 times daily, Disp: 60 tablet, Rfl: 11    budesonide-formoterol (SYMBICORT/BREYNA) 160-4.5 MCG/ACT Inhaler, Inhale 2 puffs twice daily plus 1-2 puffs as needed. May use up to 12 puffs per day., Disp: 20.4 g, Rfl: 11    deferasirox (JADENU) 360 MG tablet, Take 4 tablets (1,440 mg) by mouth daily., Disp: 120 tablet, Rfl: 11    Deferiprone, Twice Daily, 1000 MG TABS, Take 2,000 mg by mouth 2 times daily., Disp: 150 tablet, Rfl: 3    diclofenac (VOLTAREN) 1 % topical gel, Apply 4 g topically 4 times daily as needed (moderate knee pain)., Disp: 350 g, Rfl: 1    diphenhydrAMINE (BENADRYL) 50 MG capsule, Administer 12  hours pre - IV contrast injection and 2 hours prior to contrast. Patient must have a ., Disp: 2 capsule, Rfl: 0    EPINEPHrine (ANY BX GENERIC EQUIV) 0.3 MG/0.3ML injection 2-pack, Inject 0.3 mLs (0.3 mg) into the muscle as needed for anaphylaxis., Disp: 1 each, Rfl: 1    hydroxyurea (HYDREA) 500 MG capsule, Take 6 capsules (3,000 mg) by mouth daily, Disp: 180 capsule, Rfl: 11    ibuprofen (ADVIL/MOTRIN) 800 MG tablet, Take 800 mg by mouth every 8 hours as needed for moderate pain, Disp: , Rfl:     methocarbamol (ROBAXIN) 750 MG tablet, Take 1 tablet (750 mg) by mouth 4 times daily as needed for muscle spasms (during sickle pain crises. Okay to take scheduled while in pain)., Disp: 60 tablet, Rfl: 1     methylPREDNISolone (MEDROL) 32 MG tablet, Take 1 tab 12 hours before appointment and 1 tab 2 hours prior to the procedure with IV contrast., Disp: 2 tablet, Rfl: 0    naloxone (NARCAN) 4 MG/0.1ML nasal spray, Spray 4 mg into one nostril alternating nostrils as needed for opioid reversal. every 2-3 minutes until assistance arrives, Disp: 0.2 mL, Rfl: 0    naproxen (NAPROSYN) 500 MG tablet, Take 1.5 tablets (750 mg) by mouth daily., Disp: 5 tablet, Rfl: 0    oxyCODONE IR (ROXICODONE) 10 MG tablet, Take 1 tablet (10 mg) by mouth every 6 hours as needed for moderate to severe pain. (Goal of less than 4 per day), Disp: 12 tablet, Rfl: 0    pantoprazole (PROTONIX) 40 MG EC tablet, Take 1 tablet (40 mg) by mouth daily for 30 doses., Disp: 30 tablet, Rfl: 0    sucralfate (CARAFATE) 1 GM tablet, Take 1 tablet (1 g) by mouth 4 times daily for 7 days., Disp: 28 tablet, Rfl: 0    gabapentin (NEURONTIN) 300 MG capsule, Take 1 capsule (300 mg) by mouth 3 times daily. Take PO 1 pill in evening (300 mg) TID to start. If tolerated, increase by 300 mg in morning or lunch every few days, updating your doctor's office in time to get refills. The maximum dose is 900 mg TID. (Patient not taking: Reported on 3/17/2025), Disp: 90 capsule, Rfl: 1    Current Facility-Administered Medications:     botulinum toxin type A (BOTOX) 100 units injection 300 Units, 300 Units, Intramuscular, Q90 Days, Eric Duncan MD, 300 Units at 05/01/25 0826        Allergies   Allergen Reactions    Contrast Dye Angioedema     Hives and breathing issues    Fish-Derived Products Hives    Seafood Hives    Adhesive Tape Hives     Primipore dressing causes hives    Gadolinium     Iodinated Contrast Media         PHYSICAL EXAM      Strength: 4+/5 with right shoulder abduction, 4/5 with right elbow flexion, unable to assess wrist flexion due to contracture. 4/5 with  strength on right. 5/5 left shoulder abduction, elbow extension, wrist extension and   strength/finger intrinsics. 4/5 with right hip flexion, 4/5 with right knee extension, 3/5 with right ankle dorsiflexion, 4+/5 with right EHL, plantar flexion. 5/5 with all muscle groups in left lower extremity.    ROM is 120 degrees with right shoulder abduction, about 130 degrees with right elbow extension.     Tone with MAS-   2/4 with right shoulder abduction   3/4 with right finger flexors/intrinsics   2/4 with right knee flexion.    Significant right wrist contracture with minimal extension.   Sensory: intact to light touch throughout all dermatomes in bilateral lower extremities.      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      Last seen 24 for last set of injections- injected total reduced at that time due to other symptoms- see last note.     Since then, she is feeling a lot better, and tightness has come up again in right arm and leg as she is did not get her usual dose last time. She continues to have significant improvement in tone improving mobility and ADLs with injections. Feels a little tighter in her right hamstring more so than in the past and also right elbow, forearm pronation and finger flexion, started to notice the tightness coming back over the last month.      RESPONSE TO PREVIOUS TREATMENT:  Significant improvement in tone but due to reduced dose for outside factors, is more tight at visit today,    BOTULINUM NEUROTOXIN INJECTION PROCEDURES:    VERIFICATION OF PATIENT IDENTIFICATION  Responsible Person:  RUIZ  : verified  Full Name: verified    INDICATIONS FOR PROCEDURES:  Jennifer Cervantes is a 21 year old patient with spasticity affecting the right upper extremity and right lower extremity secondary to a diagnosis of sickle cell disease and previous CVA with resulting spastic hemiparesis with associated pain, loss of joint motion, loss of volitional motor control, hygiene difficulty, difficulty with  activities of daily living and difficulty with ambulation and transfers.    Her baseline symptoms have been recalcitrant to oral medications and conservative therapy.  She is here today for reinjection with Botox.    GOAL OF PROCEDURE:  The goal of this procedure is to improve increase active range of motion, improve volitional motor control, decrease pain  and enhance functional independence associated with spasticity.    TOTAL DOSE ADMINISTERED:  Increased dose today based on increased tone with MAS with right elbow flexion, finger flexion.    Dose Administered:  300 units Botox (Botulinum Toxin Type A)       1:1 Dilution     Diluent Used:  Preservative Free Normal Saline  Total Volume of Diluent Used:  3.0 ml  Please see MAR for lot number and expiry    CONSENT:  The risks, benefits, and treatment options were discussed with Jennifer Cervantes and she agreed to proceed.    EQUIPMENT USED:  Needle-27mm stimulating/recording  EMG/NCS Machine    SKIN PREPARATION:  Skin preparation was performed using an alcohol wipe.    GUIDANCE DESCRIPTION:  Electro-myographic guidance was necessary throughout the procedure to accurately identify all areas of spastic muscles while avoiding injection of non-spastic muscles and underlying muscles , neighboring nerves and nearby vascular structures.     AREA/MUSCLE INJECTED:  Right biceps- 50 U at 2 sites  Right BR- 50 U  Right pronator teres- 50 U  Right FDS- 50 U  Right FCR- 40 U  Right biceps femoris- 60 U at 2 sites    RESPONSE TO PROCEDURE:  Jennifer Cervantes tolerated the procedure well and there were no immediate complications. She was allowed to recover for an appropriate period of time and was discharged home in stable condition.     We will plan for next set of injections in 12 weeks.    Katherine Pierce MD  Physical Medicine & Rehabilitation

## 2025-05-01 NOTE — NURSING NOTE
"Oncology Rooming Note    May 1, 2025 7:57 AM   Jennifer Cervantes is a 25 year old female who presents for:    Chief Complaint   Patient presents with    Oncology Clinic Visit     Neurotoxin - Sickle cell pain     Initial Vitals: Pulse 86   Temp 98.5  F (36.9  C) (Oral)   Resp 16   Wt 71.7 kg (158 lb)   SpO2 95%   BMI 27.12 kg/m   Estimated body mass index is 27.12 kg/m  as calculated from the following:    Height as of 4/23/25: 1.626 m (5' 4\").    Weight as of this encounter: 71.7 kg (158 lb). Body surface area is 1.8 meters squared.  Severe Pain (9) Comment: Data Unavailable   No LMP recorded. Patient has had an implant.  Allergies reviewed: Yes  Medications reviewed: Yes    Medications: Medication refills not needed today.  Pharmacy name entered into EPIC:    [facility] [place], [place] - [address] [place] [address] [phone]  [facility] - [place], [place] - [address]     Frailty Screening:   Is the patient here for a new oncology consult visit in cancer care? 2. No    PHQ9:  Did this patient require a PHQ9?: No      Clinical concerns:        [place] CMA              "

## 2025-05-01 NOTE — TELEPHONE ENCOUNTER
REFERRAL INFORMATION:  Referring Provider:  Lisa Coombs MD   Referring Clinic:   EMERGENCY DEPT   Reason for Visit/Diagnosis: R10.13 (ICD-10-CM) - Epigastric pain      FUTURE VISIT INFORMATION:  Appointment Date: 6/17/25     NOTES STATUS DETAILS   OFFICE NOTE from Referring Provider Internal ED 4/24/25-[facility]   HOSPITAL DISCHARGE SUMMARY/  ED VISITS Internal ED 4/26/25-[facility]  ED 10/24/24-Dr. Rhodes-[facility]   MEDICATION LIST Internal    PROCEDURES     STOOL TESTING     LABS     PERTINENT LABS Internal    IMAGES     MRI Internal 1/30/25-MR abdomen-more in PACS   CT Internal 3/1/25-CT chest  10/2/524-CT abd pel-more in PACS   ULTRASOUND Internal 5/16/24-US abdomen   XRAY Internal 4/3/25-XR chest-more in PACS

## 2025-05-01 NOTE — TELEPHONE ENCOUNTER
Oncology Nurse Triage - Sickle Cell Pain Crisis:  Situation: Jennifer  calling about Sickle Cell Pain Crisis, requesting to be added on for IV fluids and pain medicine    Background:   Patient's last infusion was 4/29/25  Last clinic visit date:4/25/25 with Patricia Mantilla CNP  Does patient have active treatment plan? Yes    Assessment of Symptoms:  Onset/Duration of symptoms: started a few hours ago    Is it typical sickle cell pain? Yes, but not too often  Location: bilateral arms  Character: Sharp  Intensity: 10/10    Any radiation of pain, numbness, tingling, weakness, warmth, swelling, discoloration of arms or legs?No    Fever? No  Chest Pain Present: No  Shortness of breath: No    Other home therapies tried: HEAT/HEATING PAD and WARM BATH   Last home medication taken and when: oxycodone at 0500    Any Refills Needed?: No    Does patient have transportation & length of time to get to clinic: Pt is on her way to clinic to  medication. Has apt with Dr. Pierce at 0800. If she can get an apt after the apt w/Dr. Pierce, then she will not need transportation, but if she goes home after apt w/Dr. Pierce and infusion has an opening, then she will need transportation.  20 min away    Recommendations:   If you do not hear from the infusion center by 2pm then you will not be able to get in for an infusion today. If symptoms worsen while waiting for call back, and/or you experience fever, chills, SOB, chest pain, cough, n/v, dizziness, numbness, swelling, discoloration of extremities, then seek emergency evaluation in Emergency Department.     Pt voiced understanding.  Added to infusion wait list.

## 2025-05-02 ENCOUNTER — HOSPITAL ENCOUNTER (EMERGENCY)
Facility: CLINIC | Age: 25
Discharge: HOME | End: 2025-05-02
Attending: STUDENT IN AN ORGANIZED HEALTH CARE EDUCATION/TRAINING PROGRAM | Admitting: STUDENT IN AN ORGANIZED HEALTH CARE EDUCATION/TRAINING PROGRAM
Payer: MEDICAID

## 2025-05-02 VITALS
OXYGEN SATURATION: 97 % | SYSTOLIC BLOOD PRESSURE: 113 MMHG | BODY MASS INDEX: 27.04 KG/M2 | RESPIRATION RATE: 20 BRPM | WEIGHT: 158.4 LBS | HEIGHT: 64 IN | TEMPERATURE: 98 F | DIASTOLIC BLOOD PRESSURE: 71 MMHG | HEART RATE: 69 BPM

## 2025-05-02 DIAGNOSIS — D57.00 SICKLE CELL DISEASE WITH CRISIS (H): ICD-10-CM

## 2025-05-02 LAB
ALBUMIN SERPL BCG-MCNC: 4.6 G/DL (ref 3.5–5.2)
ALP SERPL-CCNC: 64 U/L (ref 40–150)
ALT SERPL W P-5'-P-CCNC: 26 U/L (ref 0–50)
ANION GAP SERPL CALCULATED.3IONS-SCNC: 14 MMOL/L (ref 7–15)
AST SERPL W P-5'-P-CCNC: 46 U/L (ref 0–45)
BASOPHILS # BLD AUTO: 0.3 10E3/UL (ref 0–0.2)
BASOPHILS NFR BLD AUTO: 2 %
BILIRUB SERPL-MCNC: 2.6 MG/DL
BUN SERPL-MCNC: 8.5 MG/DL (ref 6–20)
CALCIUM SERPL-MCNC: 8.9 MG/DL (ref 8.8–10.4)
CHLORIDE SERPL-SCNC: 106 MMOL/L (ref 98–107)
CREAT SERPL-MCNC: 0.51 MG/DL (ref 0.51–0.95)
EGFRCR SERPLBLD CKD-EPI 2021: >90 ML/MIN/1.73M2
EOSINOPHIL # BLD AUTO: 0.4 10E3/UL (ref 0–0.7)
EOSINOPHIL NFR BLD AUTO: 4 %
ERYTHROCYTE [DISTWIDTH] IN BLOOD BY AUTOMATED COUNT: 22.9 % (ref 10–15)
GLUCOSE SERPL-MCNC: 102 MG/DL (ref 70–99)
HCO3 SERPL-SCNC: 20 MMOL/L (ref 22–29)
HCT VFR BLD AUTO: 19.6 % (ref 35–47)
HGB BLD-MCNC: 7.2 G/DL (ref 11.7–15.7)
IMM GRANULOCYTES # BLD: 0.1 10E3/UL
IMM GRANULOCYTES NFR BLD: 0 %
LIPASE SERPL-CCNC: 35 U/L (ref 13–60)
LYMPHOCYTES # BLD AUTO: 1.9 10E3/UL (ref 0.8–5.3)
LYMPHOCYTES NFR BLD AUTO: 16 %
MCH RBC QN AUTO: 31.2 PG (ref 26.5–33)
MCHC RBC AUTO-ENTMCNC: 36.7 G/DL (ref 31.5–36.5)
MCV RBC AUTO: 85 FL (ref 78–100)
MONOCYTES # BLD AUTO: 1 10E3/UL (ref 0–1.3)
MONOCYTES NFR BLD AUTO: 8 %
NEUTROPHILS # BLD AUTO: 8.4 10E3/UL (ref 1.6–8.3)
NEUTROPHILS NFR BLD AUTO: 70 %
NRBC # BLD AUTO: 0.2 10E3/UL
NRBC BLD AUTO-RTO: 2 /100
PLATELET # BLD AUTO: 444 10E3/UL (ref 150–450)
POTASSIUM SERPL-SCNC: 4.2 MMOL/L (ref 3.4–5.3)
PROT SERPL-MCNC: 7.9 G/DL (ref 6.4–8.3)
RBC # BLD AUTO: 2.31 10E6/UL (ref 3.8–5.2)
SODIUM SERPL-SCNC: 140 MMOL/L (ref 135–145)
WBC # BLD AUTO: 11.9 10E3/UL (ref 4–11)

## 2025-05-02 PROCEDURE — 84295 ASSAY OF SERUM SODIUM: CPT | Performed by: STUDENT IN AN ORGANIZED HEALTH CARE EDUCATION/TRAINING PROGRAM

## 2025-05-02 PROCEDURE — 96374 THER/PROPH/DIAG INJ IV PUSH: CPT | Performed by: STUDENT IN AN ORGANIZED HEALTH CARE EDUCATION/TRAINING PROGRAM

## 2025-05-02 PROCEDURE — 36415 COLL VENOUS BLD VENIPUNCTURE: CPT | Performed by: STUDENT IN AN ORGANIZED HEALTH CARE EDUCATION/TRAINING PROGRAM

## 2025-05-02 PROCEDURE — 83690 ASSAY OF LIPASE: CPT | Performed by: STUDENT IN AN ORGANIZED HEALTH CARE EDUCATION/TRAINING PROGRAM

## 2025-05-02 PROCEDURE — 96376 TX/PRO/DX INJ SAME DRUG ADON: CPT | Performed by: STUDENT IN AN ORGANIZED HEALTH CARE EDUCATION/TRAINING PROGRAM

## 2025-05-02 PROCEDURE — 99284 EMERGENCY DEPT VISIT MOD MDM: CPT | Performed by: STUDENT IN AN ORGANIZED HEALTH CARE EDUCATION/TRAINING PROGRAM

## 2025-05-02 PROCEDURE — 258N000003 HC RX IP 258 OP 636: Performed by: STUDENT IN AN ORGANIZED HEALTH CARE EDUCATION/TRAINING PROGRAM

## 2025-05-02 PROCEDURE — 99284 EMERGENCY DEPT VISIT MOD MDM: CPT | Mod: 25 | Performed by: STUDENT IN AN ORGANIZED HEALTH CARE EDUCATION/TRAINING PROGRAM

## 2025-05-02 PROCEDURE — 96375 TX/PRO/DX INJ NEW DRUG ADDON: CPT | Performed by: STUDENT IN AN ORGANIZED HEALTH CARE EDUCATION/TRAINING PROGRAM

## 2025-05-02 PROCEDURE — 250N000011 HC RX IP 250 OP 636: Performed by: STUDENT IN AN ORGANIZED HEALTH CARE EDUCATION/TRAINING PROGRAM

## 2025-05-02 PROCEDURE — 85004 AUTOMATED DIFF WBC COUNT: CPT | Performed by: STUDENT IN AN ORGANIZED HEALTH CARE EDUCATION/TRAINING PROGRAM

## 2025-05-02 PROCEDURE — 96361 HYDRATE IV INFUSION ADD-ON: CPT | Performed by: STUDENT IN AN ORGANIZED HEALTH CARE EDUCATION/TRAINING PROGRAM

## 2025-05-02 RX ORDER — METOCLOPRAMIDE HYDROCHLORIDE 5 MG/ML
5 INJECTION INTRAMUSCULAR; INTRAVENOUS ONCE
Status: COMPLETED | OUTPATIENT
Start: 2025-05-02 | End: 2025-05-02

## 2025-05-02 RX ORDER — KETOROLAC TROMETHAMINE 30 MG/ML
30 INJECTION, SOLUTION INTRAMUSCULAR; INTRAVENOUS ONCE
Status: COMPLETED | OUTPATIENT
Start: 2025-05-02 | End: 2025-05-02

## 2025-05-02 RX ORDER — ONDANSETRON 2 MG/ML
8 INJECTION INTRAMUSCULAR; INTRAVENOUS ONCE
Status: DISCONTINUED | OUTPATIENT
Start: 2025-05-02 | End: 2025-05-02

## 2025-05-02 RX ORDER — HYDROMORPHONE HYDROCHLORIDE 1 MG/ML
1 INJECTION, SOLUTION INTRAMUSCULAR; INTRAVENOUS; SUBCUTANEOUS
Status: COMPLETED | OUTPATIENT
Start: 2025-05-02 | End: 2025-05-02

## 2025-05-02 RX ADMIN — HYDROMORPHONE HYDROCHLORIDE 1 MG: 1 INJECTION, SOLUTION INTRAMUSCULAR; INTRAVENOUS; SUBCUTANEOUS at 11:30

## 2025-05-02 RX ADMIN — SODIUM CHLORIDE, SODIUM LACTATE, POTASSIUM CHLORIDE, AND CALCIUM CHLORIDE 1000 ML: .6; .31; .03; .02 INJECTION, SOLUTION INTRAVENOUS at 11:30

## 2025-05-02 RX ADMIN — HYDROMORPHONE HYDROCHLORIDE 1 MG: 1 INJECTION, SOLUTION INTRAMUSCULAR; INTRAVENOUS; SUBCUTANEOUS at 12:37

## 2025-05-02 RX ADMIN — KETOROLAC TROMETHAMINE 30 MG: 30 INJECTION, SOLUTION INTRAMUSCULAR at 11:28

## 2025-05-02 RX ADMIN — HYDROMORPHONE HYDROCHLORIDE 1 MG: 1 INJECTION, SOLUTION INTRAMUSCULAR; INTRAVENOUS; SUBCUTANEOUS at 13:37

## 2025-05-02 RX ADMIN — PANTOPRAZOLE SODIUM 40 MG: 40 INJECTION, POWDER, LYOPHILIZED, FOR SOLUTION INTRAVENOUS at 11:23

## 2025-05-02 RX ADMIN — METOCLOPRAMIDE HYDROCHLORIDE 5 MG: 5 INJECTION INTRAMUSCULAR; INTRAVENOUS at 11:25

## 2025-05-02 ASSESSMENT — ACTIVITIES OF DAILY LIVING (ADL)
ADLS_ACUITY_SCORE: 58

## 2025-05-02 NOTE — DISCHARGE INSTRUCTIONS
You were seen for sickle cell crisis and given pain medication. Your labs are normal for you    Return with any worsening symptoms

## 2025-05-02 NOTE — ED TRIAGE NOTES
Pt is having sever sickle cell pain all over her body. Pt states the pain is 10/10 and is a dull aching pain.     Triage Assessment (Adult)       Row Name 05/02/25 1034          Triage Assessment    Airway WDL WDL        Respiratory WDL    Respiratory WDL WDL        Skin Circulation/Temperature WDL    Skin Circulation/Temperature WDL WDL        Cardiac WDL    Cardiac WDL WDL        Peripheral/Neurovascular WDL    Peripheral Neurovascular WDL WDL        Cognitive/Neuro/Behavioral WDL    Cognitive/Neuro/Behavioral WDL WDL

## 2025-05-02 NOTE — ED PROVIDER NOTES
ED Provider Note  [facility]      History     Chief Complaint   Patient presents with    Sickle Cell Pain Crisis     Pt was planning on going to infusion center to  deal with her pain crisis but they were full. Pt rates her pain as a 10/10. Denies chest pain and SOB.     HPI  Jennifer Cervantes is a 25 year old female with a history of sickle cell disease with a care plan in place, acute on chronic abdominal pain, CVA with residual deficits, and PE on anticoagulation who presents to the ED for sickle cell pain crisis.    Patient reports that she has been having pain that began yesterday, and is her whole body hitting her arms and her legs, as well as her epigastrium.  This pain crisis began yesterday, and has gradually worsened despite treatment at home.  She was post go to the infusion clinic today, but was unable to get a spot.  Denies any fevers, chills, shortness of breath or chest pain.  No diarrhea, constipation, vomiting    Past Medical History  Past Medical History:   Diagnosis Date    Anxiety     Bleeding disorder     Blood clotting disorder     Cerebral infarction (H) 2015    Cognitive developmental delay     low IQ. 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 side (H)     right hand contractures    Iron overload due to repeated red blood cell transfusions     Migraines     Multiple subsegmental pulmonary emboli without acute cor pulmonale (H) 02/01/2021    Oppositional defiant behavior     Presence of intrauterine contraceptive device 2/18/2020    Superficial venous thrombosis of arm, right 03/25/2021    Uncomplicated asthma      Past Surgical History:   Procedure Laterality Date    AS INSERT TUNNELED CV 2 CATH W/O PORT/PUMP      CHOLECYSTECTOMY      CV RIGHT HEART CATH MEASUREMENTS RECORDED N/A 11/18/2021    Procedure: Right Heart Cath;  Surgeon: Eh  MD Jackson;  Location:  HEART CARDIAC CATH LAB    INSERT PORT VASCULAR ACCESS Left 4/21/2021    Procedure: INSERTION, VASCULAR ACCESS PORT (NOT SURE ON SIDE UNTIL REMOVAL);  Surgeon: Rajan More MD;  Location: [facility] OR    IR CHEST PORT PLACEMENT > 5 YRS OF AGE  4/21/2021    IR CVC NON TUNNEL LINE REMOVAL  5/6/2021    IR CVC NON TUNNEL PLACEMENT > 5 YRS  04/07/2020    IR CVC NON TUNNEL PLACEMENT > 5 YRS  4/30/2021    IR CVC NON TUNNEL PLACEMENT > 5 YRS  9/7/2022    IR PORT REMOVAL LEFT  4/21/2021    REMOVE PORT VASCULAR ACCESS Left 4/21/2021    Procedure: REMOVAL, VASCULAR ACCESS PORT LEFT;  Surgeon: Rajan More MD;  Location: [facility] OR    REPAIR TENDON ELBOW Right 10/02/2019    Procedure: Right Elbow Flexor Lengthening, Flexor Pronator Slide Of Wrist and Finger, Thumb Adductor Lengthening;  Surgeon: Anai Franco MD;  Location: [facility] OR    TONSILLECTOMY Bilateral 10/02/2019    Procedure: Bilateral Tonsillectomy;  Surgeon: Farhana Guy MD;  Location: [facility] OR    ZZC BREAST REDUCTION (INCLUDES LIPO) TIER 3 Bilateral 04/23/2019     albuterol (PROVENTIL) (2.5 MG/3ML) 0.083% neb solution  amoxicillin-clavulanate (AUGMENTIN) 500-125 MG tablet  aspirin (ASA) 81 MG chewable tablet  budesonide-formoterol (SYMBICORT/BREYNA) 160-4.5 MCG/ACT Inhaler  deferasirox (JADENU) 360 MG tablet  Deferiprone, Twice Daily, 1000 MG TABS  diclofenac (VOLTAREN) 1 % topical gel  diphenhydrAMINE (BENADRYL) 50 MG capsule  EPINEPHrine (ANY BX GENERIC EQUIV) 0.3 MG/0.3ML injection 2-pack  gabapentin (NEURONTIN) 300 MG capsule  hydroxyurea (HYDREA) 500 MG capsule  ibuprofen (ADVIL/MOTRIN) 800 MG tablet  methocarbamol (ROBAXIN) 750 MG tablet  methylPREDNISolone (MEDROL) 32 MG tablet  naloxone (NARCAN) 4 MG/0.1ML nasal spray  naproxen (NAPROSYN) 500 MG tablet  oxyCODONE IR (ROXICODONE) 10 MG tablet  pantoprazole (PROTONIX) 40 MG EC tablet  sucralfate (CARAFATE) 1 GM tablet      Allergies   Allergen Reactions    Contrast Dye  "Angioedema     Hives and breathing issues    Fish-Derived Products Hives    Seafood Hives    Adhesive Tape Hives     Primipore dressing causes hives    Gadolinium     Iodinated Contrast Media      Family History  Family History   Problem Relation Age of Onset    Sickle Cell Trait Mother     Hypertension Mother     Asthma Mother     Sickle Cell Trait Father     Glaucoma No family hx of     Macular Degeneration No family hx of     Diabetes No family hx of     Gout No family hx of      Social History   Social History     Tobacco Use    Smoking status: Never     Passive exposure: Never    Smokeless tobacco: Never   Substance Use Topics    Alcohol use: Not Currently     Alcohol/week: 0.0 standard drinks of alcohol    Drug use: Never      Past medical history, past surgical history, medications, allergies, family history, and social history were reviewed with the patient. No additional pertinent items.   A medically appropriate review of systems was performed with pertinent positives and negatives noted in the HPI, and all other systems negative.    Physical Exam   BP: 126/77  Pulse: 88  Temp: 98  F (36.7  C)  Resp: 16  Height: 162.6 cm (5' 4\")  Weight: 71.8 kg (158 lb 6.4 oz)  SpO2: 99 %  Physical Exam    GEN: Well appearing, non toxic, cooperative  HEENT: normocephalic and atraumatic, PERRLA, EOMI  CV: well-perfused, normal skin color for ethnicity, regular rate and rhythm, no murmurs rubs or gallops  PULM: breathing comfortably, in no respiratory distress, clear to auscultation upper lung fields  ABD: nondistended, soft, mild tenderness in the epigastrium, negative Viver[place], negative [place] point tenderness, nonperitonitic  EXT: Full range of motion.  No edema.  NEURO: awake, conversant, grossly normal bilateral upper and lower extremity strength & ROM   SKIN: No rashes, ecchymosis, or lacerations  PSYCH: Calm and cooperative, interactive    ED Course, Procedures, & Data      Procedures        Results for orders placed " or performed during the hospital encounter of 05/02/25   Comprehensive metabolic panel     Status: Abnormal   Result Value Ref Range    Sodium 140 135 - 145 mmol/L    Potassium 4.2 3.4 - 5.3 mmol/L    Carbon Dioxide (CO2) 20 (L) 22 - 29 mmol/L    Anion Gap 14 7 - 15 mmol/L    Urea Nitrogen 8.5 6.0 - 20.0 mg/dL    Creatinine 0.51 0.51 - 0.95 mg/dL    GFR Estimate >90 >60 mL/min/1.73m2    Calcium 8.9 8.8 - 10.4 mg/dL    Chloride 106 98 - 107 mmol/L    Glucose 102 (H) 70 - 99 mg/dL    Alkaline Phosphatase 64 40 - 150 U/L    AST 46 (H) 0 - 45 U/L    ALT 26 0 - 50 U/L    Protein Total 7.9 6.4 - 8.3 g/dL    Albumin 4.6 3.5 - 5.2 g/dL    Bilirubin Total 2.6 (H) <=1.2 mg/dL   Lipase     Status: Normal   Result Value Ref Range    Lipase 35 13 - 60 U/L   CBC with platelets and differential     Status: Abnormal   Result Value Ref Range    WBC Count 11.9 (H) 4.0 - 11.0 10e3/uL    RBC Count 2.31 (L) 3.80 - 5.20 10e6/uL    Hemoglobin 7.2 (L) 11.7 - 15.7 g/dL    Hematocrit 19.6 (L) 35.0 - 47.0 %    MCV 85 78 - 100 fL    MCH 31.2 26.5 - 33.0 pg    MCHC 36.7 (H) 31.5 - 36.5 g/dL    RDW 22.9 (H) 10.0 - 15.0 %    Platelet Count 444 150 - 450 10e3/uL    % Neutrophils 70 %    % Lymphocytes 16 %    % Monocytes 8 %    % Eosinophils 4 %    % Basophils 2 %    % Immature Granulocytes 0 %    NRBCs per 100 WBC 2 (H) <1 /100    Absolute Neutrophils 8.4 (H) 1.6 - 8.3 10e3/uL    Absolute Lymphocytes 1.9 0.8 - 5.3 10e3/uL    Absolute Monocytes 1.0 0.0 - 1.3 10e3/uL    Absolute Eosinophils 0.4 0.0 - 0.7 10e3/uL    Absolute Basophils 0.3 (H) 0.0 - 0.2 10e3/uL    Absolute Immature Granulocytes 0.1 <=0.4 10e3/uL    Absolute NRBCs 0.2 10e3/uL   CBC with platelets differential     Status: Abnormal    Narrative    The following orders were created for panel order CBC with platelets differential.  Procedure                               Abnormality         Status                     ---------                               -----------         ------                      CBC with platelets and ...[[id number]]  Abnormal            Final result                 Please view results for these tests on the individual orders.     Medications   HYDROmorphone (PF) (DILAUDID) injection 1 mg (1 mg Intravenous $Given 5/2/25 1337)   ketorolac (TORADOL) injection 30 mg (30 mg Intravenous $Given 5/2/25 1128)   lactated ringers BOLUS 1,000 mL (0 mLs Intravenous Stopped 5/2/25 1310)   pantoprazole (PROTONIX) IV push injection 40 mg (40 mg Intravenous $Given 5/2/25 1123)   metoclopramide (REGLAN) injection 5 mg (5 mg Intravenous $Given 5/2/25 1125)     Labs Ordered and Resulted from Time of ED Arrival to Time of ED Departure   COMPREHENSIVE METABOLIC PANEL - Abnormal       Result Value    Sodium 140      Potassium 4.2      Carbon Dioxide (CO2) 20 (*)     Anion Gap 14      Urea Nitrogen 8.5      Creatinine 0.51      GFR Estimate >90      Calcium 8.9      Chloride 106      Glucose 102 (*)     Alkaline Phosphatase 64      AST 46 (*)     ALT 26      Protein Total 7.9      Albumin 4.6      Bilirubin Total 2.6 (*)    CBC WITH PLATELETS AND DIFFERENTIAL - Abnormal    WBC Count 11.9 (*)     RBC Count 2.31 (*)     Hemoglobin 7.2 (*)     Hematocrit 19.6 (*)     MCV 85      MCH 31.2      MCHC 36.7 (*)     RDW 22.9 (*)     Platelet Count 444      % Neutrophils 70      % Lymphocytes 16      % Monocytes 8      % Eosinophils 4      % Basophils 2      % Immature Granulocytes 0      NRBCs per 100 WBC 2 (*)     Absolute Neutrophils 8.4 (*)     Absolute Lymphocytes 1.9      Absolute Monocytes 1.0      Absolute Eosinophils 0.4      Absolute Basophils 0.3 (*)     Absolute Immature Granulocytes 0.1      Absolute NRBCs 0.2     LIPASE - Normal    Lipase 35       No orders to display          Critical care was not performed.     Medical Decision Making  The patient's presentation was of high complexity (a chronic illness severe exacerbation, progression, or side effect of treatment).    The patient's evaluation  involved:  review of external note(s) from 3+ sources (see separate area of note for details)  review of 3+ test result(s) ordered prior to this encounter (see separate area of note for details)  ordering and/or review of 3+ test(s) in this encounter (see separate area of note for details)    The patient's management necessitated moderate risk (prescription drug management including medications given in the ED).    Assessment & Plan    25-year-old female with a history of sickle cell anemia, care plan in place, previous CVA with residual deficits, PE on anticoagulation presenting to the emergency department due to sickle cell crisis which began yesterday, involves her arms, legs and remainder of her body as well as some epigastric abdominal pain.  Otherwise hemodynamically stable, in no respiratory distress with no shortness of breath or chest pain.  Abdominal pain is very localized to her epigastrium which is where it has been multiple times during recent evaluations.    She is currently pending outpatient GI workup including endoscopy, however we will plan to treat her today with sickle cell crisis management.  Lab work overall relatively at baseline.  Will give pain meds    1:45 PM feels better after medications. Asking for discharge    I have reviewed the nursing notes. I have reviewed the findings, diagnosis, plan and need for follow up with the patient.    New Prescriptions    No medications on file       Final diagnoses:   Sickle cell disease with crisis (H)       Lisa Coombs MD  [facility] EMERGENCY DEPARTMENT  5/2/2025     Lisa Coombs MD  05/02/25 [id number]

## 2025-05-04 ENCOUNTER — HOSPITAL ENCOUNTER (EMERGENCY)
Facility: CLINIC | Age: 25
Discharge: HOME | End: 2025-05-04
Attending: EMERGENCY MEDICINE | Admitting: EMERGENCY MEDICINE
Payer: MEDICAID

## 2025-05-04 VITALS
TEMPERATURE: 98 F | OXYGEN SATURATION: 93 % | BODY MASS INDEX: 26.27 KG/M2 | HEIGHT: 65 IN | WEIGHT: 157.7 LBS | SYSTOLIC BLOOD PRESSURE: 122 MMHG | RESPIRATION RATE: 16 BRPM | DIASTOLIC BLOOD PRESSURE: 87 MMHG | HEART RATE: 100 BPM

## 2025-05-04 DIAGNOSIS — R10.10 UPPER ABDOMINAL PAIN: ICD-10-CM

## 2025-05-04 DIAGNOSIS — R11.2 NAUSEA AND VOMITING, UNSPECIFIED VOMITING TYPE: ICD-10-CM

## 2025-05-04 LAB
ALBUMIN SERPL BCG-MCNC: 4.6 G/DL (ref 3.5–5.2)
ALP SERPL-CCNC: 68 U/L (ref 40–150)
ALT SERPL W P-5'-P-CCNC: 31 U/L (ref 0–50)
ANION GAP SERPL CALCULATED.3IONS-SCNC: 12 MMOL/L (ref 7–15)
AST SERPL W P-5'-P-CCNC: 55 U/L (ref 0–45)
BASOPHILS # BLD AUTO: 0.3 10E3/UL (ref 0–0.2)
BASOPHILS NFR BLD AUTO: 2 %
BILIRUB SERPL-MCNC: 3.3 MG/DL
BUN SERPL-MCNC: 13.5 MG/DL (ref 6–20)
CALCIUM SERPL-MCNC: 8.8 MG/DL (ref 8.8–10.4)
CHLORIDE SERPL-SCNC: 101 MMOL/L (ref 98–107)
CREAT SERPL-MCNC: 0.67 MG/DL (ref 0.51–0.95)
EGFRCR SERPLBLD CKD-EPI 2021: >90 ML/MIN/1.73M2
EOSINOPHIL # BLD AUTO: 0.4 10E3/UL (ref 0–0.7)
EOSINOPHIL NFR BLD AUTO: 3 %
ERYTHROCYTE [DISTWIDTH] IN BLOOD BY AUTOMATED COUNT: 23 % (ref 10–15)
GLUCOSE SERPL-MCNC: 88 MG/DL (ref 70–99)
HCG SERPL QL: NEGATIVE
HCO3 SERPL-SCNC: 21 MMOL/L (ref 22–29)
HCT VFR BLD AUTO: 20.4 % (ref 35–47)
HGB BLD-MCNC: 7.3 G/DL (ref 11.7–15.7)
IMM GRANULOCYTES # BLD: 0.1 10E3/UL
IMM GRANULOCYTES NFR BLD: 1 %
LIPASE SERPL-CCNC: 39 U/L (ref 13–60)
LYMPHOCYTES # BLD AUTO: 3.4 10E3/UL (ref 0.8–5.3)
LYMPHOCYTES NFR BLD AUTO: 22 %
MCH RBC QN AUTO: 29.9 PG (ref 26.5–33)
MCHC RBC AUTO-ENTMCNC: 35.8 G/DL (ref 31.5–36.5)
MCV RBC AUTO: 84 FL (ref 78–100)
MONOCYTES # BLD AUTO: 1.1 10E3/UL (ref 0–1.3)
MONOCYTES NFR BLD AUTO: 7 %
NEUTROPHILS # BLD AUTO: 10.5 10E3/UL (ref 1.6–8.3)
NEUTROPHILS NFR BLD AUTO: 67 %
NRBC # BLD AUTO: 0.2 10E3/UL
NRBC BLD AUTO-RTO: 1 /100
PLATELET # BLD AUTO: 449 10E3/UL (ref 150–450)
POTASSIUM SERPL-SCNC: 4 MMOL/L (ref 3.4–5.3)
PROT SERPL-MCNC: 7.8 G/DL (ref 6.4–8.3)
RBC # BLD AUTO: 2.44 10E6/UL (ref 3.8–5.2)
RETICS # AUTO: 0.53 10E6/UL (ref 0.03–0.1)
RETICS/RBC NFR AUTO: 21.5 % (ref 0.5–2)
SODIUM SERPL-SCNC: 134 MMOL/L (ref 135–145)
WBC # BLD AUTO: 15.8 10E3/UL (ref 4–11)

## 2025-05-04 PROCEDURE — 83690 ASSAY OF LIPASE: CPT | Performed by: EMERGENCY MEDICINE

## 2025-05-04 PROCEDURE — 85045 AUTOMATED RETICULOCYTE COUNT: CPT | Performed by: EMERGENCY MEDICINE

## 2025-05-04 PROCEDURE — 250N000011 HC RX IP 250 OP 636: Performed by: EMERGENCY MEDICINE

## 2025-05-04 PROCEDURE — 80053 COMPREHEN METABOLIC PANEL: CPT | Performed by: EMERGENCY MEDICINE

## 2025-05-04 PROCEDURE — 258N000003 HC RX IP 258 OP 636: Performed by: EMERGENCY MEDICINE

## 2025-05-04 PROCEDURE — 99284 EMERGENCY DEPT VISIT MOD MDM: CPT | Performed by: EMERGENCY MEDICINE

## 2025-05-04 PROCEDURE — 96375 TX/PRO/DX INJ NEW DRUG ADDON: CPT | Performed by: EMERGENCY MEDICINE

## 2025-05-04 PROCEDURE — 85025 COMPLETE CBC W/AUTO DIFF WBC: CPT | Performed by: EMERGENCY MEDICINE

## 2025-05-04 PROCEDURE — 84703 CHORIONIC GONADOTROPIN ASSAY: CPT | Performed by: EMERGENCY MEDICINE

## 2025-05-04 PROCEDURE — 36415 COLL VENOUS BLD VENIPUNCTURE: CPT | Performed by: EMERGENCY MEDICINE

## 2025-05-04 PROCEDURE — 96374 THER/PROPH/DIAG INJ IV PUSH: CPT | Performed by: EMERGENCY MEDICINE

## 2025-05-04 PROCEDURE — 99284 EMERGENCY DEPT VISIT MOD MDM: CPT | Mod: 25 | Performed by: EMERGENCY MEDICINE

## 2025-05-04 PROCEDURE — 96361 HYDRATE IV INFUSION ADD-ON: CPT | Performed by: EMERGENCY MEDICINE

## 2025-05-04 RX ORDER — METOCLOPRAMIDE HYDROCHLORIDE 5 MG/ML
10 INJECTION INTRAMUSCULAR; INTRAVENOUS ONCE
Status: COMPLETED | OUTPATIENT
Start: 2025-05-04 | End: 2025-05-04

## 2025-05-04 RX ORDER — HEPARIN SODIUM (PORCINE) LOCK FLUSH IV SOLN 100 UNIT/ML 100 UNIT/ML
5 SOLUTION INTRAVENOUS ONCE
Status: COMPLETED | OUTPATIENT
Start: 2025-05-04 | End: 2025-05-04

## 2025-05-04 RX ORDER — HYDROMORPHONE HYDROCHLORIDE 1 MG/ML
1 INJECTION, SOLUTION INTRAMUSCULAR; INTRAVENOUS; SUBCUTANEOUS ONCE
Status: COMPLETED | OUTPATIENT
Start: 2025-05-04 | End: 2025-05-04

## 2025-05-04 RX ADMIN — METOCLOPRAMIDE HYDROCHLORIDE 10 MG: 5 INJECTION INTRAMUSCULAR; INTRAVENOUS at 01:53

## 2025-05-04 RX ADMIN — FAMOTIDINE 20 MG: 10 INJECTION, SOLUTION INTRAVENOUS at 01:53

## 2025-05-04 RX ADMIN — SODIUM CHLORIDE 1000 ML: 0.9 INJECTION, SOLUTION INTRAVENOUS at 01:52

## 2025-05-04 RX ADMIN — HYDROMORPHONE HYDROCHLORIDE 1 MG: 1 INJECTION, SOLUTION INTRAMUSCULAR; INTRAVENOUS; SUBCUTANEOUS at 01:54

## 2025-05-04 RX ADMIN — HEPARIN 5 ML: 100 SYRINGE at 03:21

## 2025-05-04 ASSESSMENT — COLUMBIA-SUICIDE SEVERITY RATING SCALE - C-SSRS
2. HAVE YOU ACTUALLY HAD ANY THOUGHTS OF KILLING YOURSELF IN THE PAST MONTH?: NO
6. HAVE YOU EVER DONE ANYTHING, STARTED TO DO ANYTHING, OR PREPARED TO DO ANYTHING TO END YOUR LIFE?: NO
1. IN THE PAST MONTH, HAVE YOU WISHED YOU WERE DEAD OR WISHED YOU COULD GO TO SLEEP AND NOT WAKE UP?: NO
1. IN THE PAST MONTH, HAVE YOU WISHED YOU WERE DEAD OR WISHED YOU COULD GO TO SLEEP AND NOT WAKE UP?: NO
2. HAVE YOU ACTUALLY HAD ANY THOUGHTS OF KILLING YOURSELF IN THE PAST MONTH?: NO
6. HAVE YOU EVER DONE ANYTHING, STARTED TO DO ANYTHING, OR PREPARED TO DO ANYTHING TO END YOUR LIFE?: NO

## 2025-05-04 ASSESSMENT — ACTIVITIES OF DAILY LIVING (ADL)
ADLS_ACUITY_SCORE: 58

## 2025-05-04 NOTE — ED TRIAGE NOTES
Triage Assessment (Adult)       Row Name 05/04/25 0017          Triage Assessment    Airway WDL WDL        Respiratory WDL    Respiratory WDL WDL

## 2025-05-04 NOTE — DISCHARGE INSTRUCTIONS
Continue with your normal medications. Follow up with your clinic doctor. Return with any worsening or concerns.

## 2025-05-04 NOTE — ED PROVIDER NOTES
[facility] EMERGENCY DEPARTMENT ([facility])    5/04/25      ED PROVIDER NOTE    History     Chief Complaint   Patient presents with    Abdominal Pain     Abdominal pain. Unable to eat due to pain.       SHEILA Cervantes is a 25 year old female with a history notable for Hb-SS with ED care plan, acute on chronic abdominal pain, CVA with residual deficits, and PE on anticoagulation who presents to ED with complaint of upper abdominal pain.  She admits it has been going on for about a year and a half, waxing and waning, but having ongoing issues.  She has an endoscopy scheduled as a part of her GI evaluation, but not until mid or late June, so she is pretty frustrated by this.  She states she has just had ongoing issues with nausea and vomiting, did vomit once tonight.  This was nonbloody.  She states she is stooling normally.  She states pain is primarily in the upper abdomen, and is worse with eating so makes it really difficult to eat anything.  She states that she oftentimes gets treated for sickle cell pain when she has this pain, but she does not know that it is related.  She states she was given some sort of medication last time that helped her and wonders if she can get that again.  No fever or cough, no shortness of breath.  No dysuria.  She states she has had lots of imaging for this and she does not think that is necessarily helpful, she states this feels similar to what she has had before and the imaging usually is not revealing.  She states that she is a little frustrated because her appointment is so far out from endoscopy.  She states she is taking her meds at home and she believes she is taking an acid blocker.    This part of the medical record was transcribed by Jacob Valencia Scribsandra, from a dictation done by Court Ring MD.     Past Medical History  Past Medical History:   Diagnosis Date    Anxiety     Bleeding disorder     Blood clotting disorder     Cerebral infarction (H)  2015    Cognitive developmental delay     low IQ. 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 side (H)     right hand contractures    Iron overload due to repeated red blood cell transfusions     Migraines     Multiple subsegmental pulmonary emboli without acute cor pulmonale (H) 02/01/2021    Oppositional defiant behavior     Presence of intrauterine contraceptive device 2/18/2020    Superficial venous thrombosis of arm, right 03/25/2021    Uncomplicated asthma      Past Surgical History:   Procedure Laterality Date    AS INSERT TUNNELED CV 2 CATH W/O PORT/PUMP      CHOLECYSTECTOMY      CV RIGHT HEART CATH MEASUREMENTS RECORDED N/A 11/18/2021    Procedure: Right Heart Cath;  Surgeon: Jackson Stauffer MD;  Location:  HEART CARDIAC CATH LAB    INSERT PORT VASCULAR ACCESS Left 4/21/2021    Procedure: INSERTION, VASCULAR ACCESS PORT (NOT SURE ON SIDE UNTIL REMOVAL);  Surgeon: Rajan More MD;  Location: [facility] OR    IR CHEST PORT PLACEMENT > 5 YRS OF AGE  4/21/2021    IR CVC NON TUNNEL LINE REMOVAL  5/6/2021    IR CVC NON TUNNEL PLACEMENT > 5 YRS  04/07/2020    IR CVC NON TUNNEL PLACEMENT > 5 YRS  4/30/2021    IR CVC NON TUNNEL PLACEMENT > 5 YRS  9/7/2022    IR PORT REMOVAL LEFT  4/21/2021    REMOVE PORT VASCULAR ACCESS Left 4/21/2021    Procedure: REMOVAL, VASCULAR ACCESS PORT LEFT;  Surgeon: Rajan More MD;  Location: [facility] OR    REPAIR TENDON ELBOW Right 10/02/2019    Procedure: Right Elbow Flexor Lengthening, Flexor Pronator Slide Of Wrist and Finger, Thumb Adductor Lengthening;  Surgeon: Anai Franco MD;  Location: [facility] OR    TONSILLECTOMY Bilateral 10/02/2019    Procedure: Bilateral Tonsillectomy;  Surgeon: Farhana Guy MD;  Location: [facility] OR    ZZC BREAST REDUCTION (INCLUDES LIPO) TIER 3 Bilateral 04/23/2019     albuterol (PROVENTIL) (2.5 MG/3ML) 0.083% neb  "solution  aspirin (ASA) 81 MG chewable tablet  budesonide-formoterol (SYMBICORT/BREYNA) 160-4.5 MCG/ACT Inhaler  deferasirox (JADENU) 360 MG tablet  Deferiprone, Twice Daily, 1000 MG TABS  diclofenac (VOLTAREN) 1 % topical gel  diphenhydrAMINE (BENADRYL) 50 MG capsule  EPINEPHrine (ANY BX GENERIC EQUIV) 0.3 MG/0.3ML injection 2-pack  gabapentin (NEURONTIN) 300 MG capsule  hydroxyurea (HYDREA) 500 MG capsule  ibuprofen (ADVIL/MOTRIN) 800 MG tablet  methocarbamol (ROBAXIN) 750 MG tablet  methylPREDNISolone (MEDROL) 32 MG tablet  naloxone (NARCAN) 4 MG/0.1ML nasal spray  naproxen (NAPROSYN) 500 MG tablet  oxyCODONE IR (ROXICODONE) 10 MG tablet  pantoprazole (PROTONIX) 40 MG EC tablet      Allergies   Allergen Reactions    Contrast Dye Angioedema     Hives and breathing issues    Fish-Derived Products Hives    Seafood Hives    Adhesive Tape Hives     Primipore dressing causes hives    Gadolinium     Iodinated Contrast Media      Family History  Family History   Problem Relation Age of Onset    Sickle Cell Trait Mother     Hypertension Mother     Asthma Mother     Sickle Cell Trait Father     Glaucoma No family hx of     Macular Degeneration No family hx of     Diabetes No family hx of     Gout No family hx of      Social History   Social History     Tobacco Use    Smoking status: Never     Passive exposure: Never    Smokeless tobacco: Never   Substance Use Topics    Alcohol use: Not Currently     Alcohol/week: 0.0 standard drinks of alcohol    Drug use: Never      Past medical history, past surgical history, medications, allergies, family history, and social history were reviewed with the patient. No additional pertinent items.     A complete review of systems was performed with pertinent positives and negatives noted in the HPI, and all other systems negative.    Physical Exam   BP: 133/86  Pulse: 102  Temp: 98  F (36.7  C)  Resp: 16  Height: 165.1 cm (5' 5\")  Weight: 71.5 kg (157 lb 11.2 oz)  SpO2: 94 %    Physical " Exam  Constitutional:       General: She is not in acute distress.     Appearance: Normal appearance. She is not toxic-appearing.   HENT:      Head: Atraumatic.      Mouth/Throat:      Mouth: Mucous membranes are moist.   Eyes:      General: No scleral icterus.     Conjunctiva/sclera: Conjunctivae normal.   Cardiovascular:      Rate and Rhythm: Normal rate.      Heart sounds: Normal heart sounds.   Pulmonary:      Effort: No respiratory distress.      Breath sounds: Normal breath sounds.   Abdominal:      Palpations: Abdomen is soft.      Tenderness: There is abdominal tenderness.       Musculoskeletal:         General: No deformity.      Cervical back: Neck supple.   Skin:     General: Skin is warm.      Findings: No rash.   Neurological:      Mental Status: She is alert.           ED Course, Procedures, & Data      Procedures              Results for orders placed or performed during the hospital encounter of 05/04/25   Comprehensive metabolic panel     Status: Abnormal   Result Value Ref Range    Sodium 134 (L) 135 - 145 mmol/L    Potassium 4.0 3.4 - 5.3 mmol/L    Carbon Dioxide (CO2) 21 (L) 22 - 29 mmol/L    Anion Gap 12 7 - 15 mmol/L    Urea Nitrogen 13.5 6.0 - 20.0 mg/dL    Creatinine 0.67 0.51 - 0.95 mg/dL    GFR Estimate >90 >60 mL/min/1.73m2    Calcium 8.8 8.8 - 10.4 mg/dL    Chloride 101 98 - 107 mmol/L    Glucose 88 70 - 99 mg/dL    Alkaline Phosphatase 68 40 - 150 U/L    AST 55 (H) 0 - 45 U/L    ALT 31 0 - 50 U/L    Protein Total 7.8 6.4 - 8.3 g/dL    Albumin 4.6 3.5 - 5.2 g/dL    Bilirubin Total 3.3 (H) <=1.2 mg/dL   Lipase     Status: Normal   Result Value Ref Range    Lipase 39 13 - 60 U/L   HCG qualitative pregnancy (blood)     Status: Normal   Result Value Ref Range    hCG Serum Qualitative Negative Negative   Reticulocyte count     Status: Abnormal   Result Value Ref Range    % Reticulocyte 21.5 (H) 0.5 - 2.0 %    Absolute Reticulocyte 0.525 (H) 0.025 - 0.095 10e6/uL   CBC with platelets and  differential     Status: Abnormal   Result Value Ref Range    WBC Count 15.8 (H) 4.0 - 11.0 10e3/uL    RBC Count 2.44 (L) 3.80 - 5.20 10e6/uL    Hemoglobin 7.3 (L) 11.7 - 15.7 g/dL    Hematocrit 20.4 (L) 35.0 - 47.0 %    MCV 84 78 - 100 fL    MCH 29.9 26.5 - 33.0 pg    MCHC 35.8 31.5 - 36.5 g/dL    RDW 23.0 (H) 10.0 - 15.0 %    Platelet Count 449 150 - 450 10e3/uL    % Neutrophils 67 %    % Lymphocytes 22 %    % Monocytes 7 %    % Eosinophils 3 %    % Basophils 2 %    % Immature Granulocytes 1 %    NRBCs per 100 WBC 1 (H) <1 /100    Absolute Neutrophils 10.5 (H) 1.6 - 8.3 10e3/uL    Absolute Lymphocytes 3.4 0.8 - 5.3 10e3/uL    Absolute Monocytes 1.1 0.0 - 1.3 10e3/uL    Absolute Eosinophils 0.4 0.0 - 0.7 10e3/uL    Absolute Basophils 0.3 (H) 0.0 - 0.2 10e3/uL    Absolute Immature Granulocytes 0.1 <=0.4 10e3/uL    Absolute NRBCs 0.2 10e3/uL   CBC with platelets differential     Status: Abnormal    Narrative    The following orders were created for panel order CBC with platelets differential.  Procedure                               Abnormality         Status                     ---------                               -----------         ------                     CBC with platelets and ...[[id number]]  Abnormal            Final result                 Please view results for these tests on the individual orders.     Medications   HYDROmorphone (PF) (DILAUDID) injection 1 mg (1 mg Intravenous $Given 5/4/25 0154)   metoclopramide (REGLAN) injection 10 mg (10 mg Intravenous $Given 5/4/25 0153)   sodium chloride 0.9% BOLUS 1,000 mL (0 mLs Intravenous Stopped 5/4/25 0302)   famotidine (PEPCID) injection 20 mg (20 mg Intravenous $Given 5/4/25 0153)   heparin lock flush 100 unit/mL injection 5 mL (5 mLs Intravenous $Given 5/4/25 6491)     Labs Ordered and Resulted from Time of ED Arrival to Time of ED Departure   COMPREHENSIVE METABOLIC PANEL - Abnormal       Result Value    Sodium 134 (*)     Potassium 4.0      Carbon  Dioxide (CO2) 21 (*)     Anion Gap 12      Urea Nitrogen 13.5      Creatinine 0.67      GFR Estimate >90      Calcium 8.8      Chloride 101      Glucose 88      Alkaline Phosphatase 68      AST 55 (*)     ALT 31      Protein Total 7.8      Albumin 4.6      Bilirubin Total 3.3 (*)    RETICULOCYTE COUNT - Abnormal    % Reticulocyte 21.5 (*)     Absolute Reticulocyte 0.525 (*)    CBC WITH PLATELETS AND DIFFERENTIAL - Abnormal    WBC Count 15.8 (*)     RBC Count 2.44 (*)     Hemoglobin 7.3 (*)     Hematocrit 20.4 (*)     MCV 84      MCH 29.9      MCHC 35.8      RDW 23.0 (*)     Platelet Count 449      % Neutrophils 67      % Lymphocytes 22      % Monocytes 7      % Eosinophils 3      % Basophils 2      % Immature Granulocytes 1      NRBCs per 100 WBC 1 (*)     Absolute Neutrophils 10.5 (*)     Absolute Lymphocytes 3.4      Absolute Monocytes 1.1      Absolute Eosinophils 0.4      Absolute Basophils 0.3 (*)     Absolute Immature Granulocytes 0.1      Absolute NRBCs 0.2     LIPASE - Normal    Lipase 39     HCG QUALITATIVE PREGNANCY - Normal    hCG Serum Qualitative Negative     ROUTINE UA WITH MICROSCOPIC REFLEX TO CULTURE     No orders to display          Critical care was not performed.     Medical Decision Making  The patient's presentation was of moderate complexity (a chronic illness mild to moderate exacerbation, progression, or side effect of treatment).    The patient's evaluation involved:  review of external note(s) from 2 sources (previous notes)  review of 3+ test result(s) ordered prior to this encounter (previous results)  ordering and/or review of 3+ test(s) in this encounter (see separate area of note for details)    The patient's management necessitated moderate risk (prescription drug management including medications given in the ED) and high risk (a parenteral controlled substance).    Assessment & Plan    The patient states that this is the same type of pain in the same location as she has been having  for quite a while.  Previous workups have not been revealing.  She states that at times she is treated for sickle cell crisis, though she is not certain it is a sickle cell crisis.  She does have a follow-up appointment scheduled for endoscopy in June.  She states that she was given something last time she was here that helped, is not sure what it was.  She did state that she thought she might benefit from 1 dose of Dilaudid but did not want to do her full pain protocol.  I do not think is unreasonable to give this, did give her 1 dose.  Basic labs are fairly stable compared with previous.  SAIMA novak is up slightly, likely in relation to her sickle cell.  She was given famotidine, Reglan, as well as 1 dose of Dilaudid-no repeat evaluation states she feels much improved.  She states she does not want any more opioids, she is trying to work really hard on cutting back on opioids overall.  I did commend her for this, encouraged her to continue on this path.  She is encouraged to follow-up with her outpatient provider, return with any new or worsening symptoms, any other concerns.  Low suspicion for acute surgical abnormality at this time, no sign or symptom concerning for infection.  I do suspect this is her chronic underlying issue that is flaring today.  She already is taking a PPI at home, is encouraged to continue this.  She verbalizes understanding and is agreeable to the plan.    Dictation Disclaimer: Some of this Note has been completed with voice-recognition dictation software. Although errors are generally corrected real-time, there is the potential for a rare error to be present in the completed chart.      I have reviewed the nursing notes. I have reviewed the findings, diagnosis, plan and need for follow up with the patient.    Discharge Medication List as of 5/4/2025  3:03 AM          Final diagnoses:   Upper abdominal pain   Nausea and vomiting, unspecified vomiting type       [facility]  [facility] EMERGENCY DEPARTMENT  5/4/2025     Court Ring MD  05/04/25 0355

## 2025-05-05 ENCOUNTER — NURSE TRIAGE (OUTPATIENT)
Dept: ONCOLOGY | Facility: CLINIC | Age: 25
End: 2025-05-05
Payer: MEDICAID

## 2025-05-05 NOTE — TELEPHONE ENCOUNTER
Call from pt, who wonders if other infusion sites have openings today, as she is trying to avoid going to the ED.     1214 call to [facility], spoke w/ charge nurse, Rosanne, who states they do not have enough chair space today.     1228 call to pt to update that infusion is full as of now, will call her back if anything changes.

## 2025-05-05 NOTE — TELEPHONE ENCOUNTER
Oncology Nurse Triage - Sickle Cell Pain Crisis:    Situation: Jennifer  calling about Sickle Cell Pain Crisis, requesting to be added on for IV fluids and pain medicine    Background:     Patient's last infusion was 5/4/2025 was in ED  Last clinic visit date:4/4/2025 Patricia Mantilla CNP  Does patient have active treatment plan? Yes    Assessment of Symptoms:  Onset/Duration of symptoms: 1 day    Is it typical sickle cell pain? Yes  Location: stomach pain  Character: dull  Intensity: 8/10    Any radiation of pain, numbness, tingling, weakness, warmth, swelling, discoloration of arms or legs?No    Fever? No  Chest Pain Present: No  Shortness of breath: No    Other home therapies tried: HEAT/HEATING PAD and WARM BATH     Last home medication taken and when: 5:00am  Oxycodone    Any Refills Needed?: No    Does patient have transportation & length of time to get to clinic: Yes  Has own ride if early opening, if later in day would like assistance with transportation    Recommendations:   0713 Pending paged Patricia Mantilla CNP  0811 Approved by Patricia for IVF/Pain per therapy plan, no labs in plan.

## 2025-05-06 ENCOUNTER — NURSE TRIAGE (OUTPATIENT)
Dept: ONCOLOGY | Facility: CLINIC | Age: 25
End: 2025-05-06
Payer: MEDICAID

## 2025-05-06 ENCOUNTER — INFUSION THERAPY VISIT (OUTPATIENT)
Dept: ONCOLOGY | Facility: CLINIC | Age: 25
End: 2025-05-06
Payer: MEDICAID

## 2025-05-06 ENCOUNTER — PATIENT OUTREACH (OUTPATIENT)
Dept: CARE COORDINATION | Facility: CLINIC | Age: 25
End: 2025-05-06
Payer: MEDICAID

## 2025-05-06 VITALS
OXYGEN SATURATION: 93 % | HEART RATE: 103 BPM | TEMPERATURE: 98 F | SYSTOLIC BLOOD PRESSURE: 130 MMHG | DIASTOLIC BLOOD PRESSURE: 84 MMHG | RESPIRATION RATE: 16 BRPM

## 2025-05-06 DIAGNOSIS — G81.10 SPASTIC HEMIPLEGIA, UNSPECIFIED ETIOLOGY, UNSPECIFIED LATERALITY (H): ICD-10-CM

## 2025-05-06 DIAGNOSIS — D57.00 SICKLE CELL PAIN CRISIS (H): Primary | ICD-10-CM

## 2025-05-06 PROCEDURE — 96374 THER/PROPH/DIAG INJ IV PUSH: CPT

## 2025-05-06 PROCEDURE — 96375 TX/PRO/DX INJ NEW DRUG ADDON: CPT

## 2025-05-06 PROCEDURE — 250N000013 HC RX MED GY IP 250 OP 250 PS 637: Performed by: PEDIATRICS

## 2025-05-06 PROCEDURE — 258N000003 HC RX IP 258 OP 636: Performed by: PEDIATRICS

## 2025-05-06 PROCEDURE — 250N000011 HC RX IP 250 OP 636: Performed by: PEDIATRICS

## 2025-05-06 PROCEDURE — 96361 HYDRATE IV INFUSION ADD-ON: CPT

## 2025-05-06 PROCEDURE — 96376 TX/PRO/DX INJ SAME DRUG ADON: CPT

## 2025-05-06 RX ORDER — HEPARIN SODIUM,PORCINE 10 UNIT/ML
5 VIAL (ML) INTRAVENOUS
Status: CANCELLED | OUTPATIENT
Start: 2025-08-01

## 2025-05-06 RX ORDER — KETOROLAC TROMETHAMINE 30 MG/ML
30 INJECTION, SOLUTION INTRAMUSCULAR; INTRAVENOUS ONCE
Status: COMPLETED | OUTPATIENT
Start: 2025-05-06 | End: 2025-05-06

## 2025-05-06 RX ORDER — HEPARIN SODIUM (PORCINE) LOCK FLUSH IV SOLN 100 UNIT/ML 100 UNIT/ML
5 SOLUTION INTRAVENOUS
Status: CANCELLED | OUTPATIENT
Start: 2025-08-01

## 2025-05-06 RX ORDER — HEPARIN SODIUM (PORCINE) LOCK FLUSH IV SOLN 100 UNIT/ML 100 UNIT/ML
5 SOLUTION INTRAVENOUS
Status: DISCONTINUED | OUTPATIENT
Start: 2025-05-06 | End: 2025-05-06 | Stop reason: HOSPADM

## 2025-05-06 RX ORDER — DIPHENHYDRAMINE HCL 25 MG
50 CAPSULE ORAL
Status: COMPLETED | OUTPATIENT
Start: 2025-05-06 | End: 2025-05-06

## 2025-05-06 RX ORDER — DIPHENHYDRAMINE HCL 25 MG
50 CAPSULE ORAL
Status: CANCELLED
Start: 2025-08-01

## 2025-05-06 RX ORDER — ONDANSETRON 4 MG/1
8 TABLET, FILM COATED ORAL
Status: CANCELLED
Start: 2025-08-01

## 2025-05-06 RX ORDER — KETOROLAC TROMETHAMINE 30 MG/ML
30 INJECTION, SOLUTION INTRAMUSCULAR; INTRAVENOUS ONCE
Status: CANCELLED
Start: 2025-08-01 | End: 2025-08-01

## 2025-05-06 RX ORDER — ONDANSETRON 2 MG/ML
8 INJECTION INTRAMUSCULAR; INTRAVENOUS EVERY 6 HOURS PRN
Status: CANCELLED
Start: 2025-08-01

## 2025-05-06 RX ORDER — ONDANSETRON 2 MG/ML
8 INJECTION INTRAMUSCULAR; INTRAVENOUS EVERY 6 HOURS PRN
Status: DISCONTINUED | OUTPATIENT
Start: 2025-05-06 | End: 2025-05-06 | Stop reason: HOSPADM

## 2025-05-06 RX ORDER — ONDANSETRON 4 MG/1
8 TABLET, FILM COATED ORAL
Status: DISCONTINUED | OUTPATIENT
Start: 2025-05-06 | End: 2025-05-06

## 2025-05-06 RX ADMIN — HYDROMORPHONE HYDROCHLORIDE 1 MG: 1 INJECTION, SOLUTION INTRAMUSCULAR; INTRAVENOUS; SUBCUTANEOUS at 11:33

## 2025-05-06 RX ADMIN — ONDANSETRON 8 MG: 2 INJECTION INTRAMUSCULAR; INTRAVENOUS at 10:39

## 2025-05-06 RX ADMIN — Medication 5 ML: at 13:02

## 2025-05-06 RX ADMIN — HYDROMORPHONE HYDROCHLORIDE 1 MG: 1 INJECTION, SOLUTION INTRAMUSCULAR; INTRAVENOUS; SUBCUTANEOUS at 10:33

## 2025-05-06 RX ADMIN — SODIUM CHLORIDE, SODIUM LACTATE, POTASSIUM CHLORIDE, CALCIUM CHLORIDE 1000 ML: 600; 310; 30; 20 INJECTION, SOLUTION INTRAVENOUS at 10:33

## 2025-05-06 RX ADMIN — DIPHENHYDRAMINE HYDROCHLORIDE 50 MG: 25 CAPSULE ORAL at 10:43

## 2025-05-06 RX ADMIN — HYDROMORPHONE HYDROCHLORIDE 1 MG: 1 INJECTION, SOLUTION INTRAMUSCULAR; INTRAVENOUS; SUBCUTANEOUS at 12:32

## 2025-05-06 RX ADMIN — KETOROLAC TROMETHAMINE 30 MG: 30 INJECTION, SOLUTION INTRAMUSCULAR; INTRAVENOUS at 10:46

## 2025-05-06 ASSESSMENT — PAIN SCALES - GENERAL: PAINLEVEL_OUTOF10: SEVERE PAIN (8)

## 2025-05-06 NOTE — TELEPHONE ENCOUNTER
"Oncology Nurse Triage - Sickle Cell Pain Crisis:    Situation: Jennifer  calling about Sickle Cell Pain Crisis, requesting to be added on for IV fluids and pain medicine    Background:     Patient's last infusion was 05/04/25  Last clinic visit date:04/25/25 w/Patricia Mantilla  Does patient have active treatment plan? Yes    Assessment of Symptoms:  Onset/Duration of symptoms: 2 day    Is it typical sickle cell pain? Yes  Location: \"all over\"  Character: Dull ache  Intensity: 8/10    Any radiation of pain, numbness, tingling, weakness, warmth, swelling, discoloration of arms or legs?No    Fever? No    Chest Pain Present: No    Shortness of breath: No    Other home therapies tried: HEAT/HEATING PAD and WARM BATH     Last home medication taken and when: yesterday at 10 pm oxycodone    Any Refills Needed?: No    Does patient have transportation & length of time to get to clinic: Yes, has transportation if appt available before 11am. Takes her 20 min to get to clinic.       Recommendations:     Reviewed with pt if she does not hear from the infusion center by 2pm then she will not be able to get in for an infusion today. If symptoms worsen while waiting for call back, and/or you experience fever, chills, SOB, chest pain, cough, n/v, dizziness, numbness, swelling, discoloration of extremities, then seek emergency evaluation in Emergency Department.     0706 Secure message to Patricia Mantilla  0712 Approved by Patricia Mantilla.          "

## 2025-05-06 NOTE — TELEPHONE ENCOUNTER
Available appt at 1000 with [employer]. Pt confirmed she is able to make appt and has transportation.     Message sent to [facility] to have pt added to the schedule.

## 2025-05-06 NOTE — PROGRESS NOTES
"Infusion Nursing Note:  Jennifer Cervantes presents today for IV fluids, pain meds.    Patient seen by provider today: No   present during visit today: Not Applicable.    Note: Patient arrives to infusion feeling well, pain is \"all over\" rating it a 8/10 on arrival. She last took something for pain at home at 10 pm last evening.     Patient denies any fevers, chills, cough, or other infectious symptoms.  Denies any shortness of breath, dizziness, lightheadedness or chest pain.   No nausea, vomiting, diarrhea, constipation, or urinary symptoms. Wishes to proceed with planned treatment today.     Pain level down to 3-4/10 upon discharge.     Intravenous Access:  Implanted Port.    Treatment Conditions:  Not Applicable.    Post Infusion Assessment:  Patient tolerated infusion without incident.  Blood return noted pre and post infusion.  Access discontinued per protocol.     Discharge Plan:   Patient declined prescription refills.  AVS to patient via enavuT.  Patient will return 5/23 for next appointment.   Patient discharged in stable condition accompanied by: self.  Departure Mode: Ambulatory.      Henny Suresh RN  "

## 2025-05-06 NOTE — PROGRESS NOTES
Ambulatory Care Management- Transportation Support  Oncology Clinic     Data/Intervention:  Patient Name: Jennifer Cervantes     /Age: 2000 (24 year old)     C[facility] team member collaborated with following Oncology SW regarding this request: From Nurse Triage for transportation      Assessment:  CHW/CTA called [employer] to arrange medical appointment transportation for a will call ride home. Patient will need to call when ready for return ride home [phone] for a pickup.   Plan:  Talked to patient about the transportation plan.  available to assist with any other needs.         Isaura OTTO Community Health Worker  Clinic Care Coordination  [facility]  Phone: [phone]

## 2025-05-06 NOTE — TELEPHONE ENCOUNTER
Pt calling to request SW be notified to assist with scheduling transportation home from infusion today.    Message sent to SW to assist with scheduling ride.

## 2025-05-07 ENCOUNTER — HOSPITAL ENCOUNTER (EMERGENCY)
Facility: CLINIC | Age: 25
Discharge: HOME | End: 2025-05-07
Attending: FAMILY MEDICINE | Admitting: FAMILY MEDICINE
Payer: MEDICAID

## 2025-05-07 ENCOUNTER — NURSE TRIAGE (OUTPATIENT)
Dept: ONCOLOGY | Facility: CLINIC | Age: 25
End: 2025-05-07

## 2025-05-07 VITALS
WEIGHT: 158 LBS | TEMPERATURE: 97.7 F | SYSTOLIC BLOOD PRESSURE: 123 MMHG | HEART RATE: 82 BPM | DIASTOLIC BLOOD PRESSURE: 64 MMHG | OXYGEN SATURATION: 92 % | RESPIRATION RATE: 18 BRPM | HEIGHT: 64 IN | BODY MASS INDEX: 26.98 KG/M2

## 2025-05-07 DIAGNOSIS — D57.00 SICKLE CELL DISEASE WITH CRISIS (H): ICD-10-CM

## 2025-05-07 DIAGNOSIS — D57.00 SICKLE CELL PAIN CRISIS (H): ICD-10-CM

## 2025-05-07 LAB
ATRIAL RATE - MUSE: 82 BPM
BASOPHILS # BLD AUTO: 0.2 10E3/UL (ref 0–0.2)
BASOPHILS NFR BLD AUTO: 2 %
DIASTOLIC BLOOD PRESSURE - MUSE: NORMAL MMHG
EOSINOPHIL # BLD AUTO: 0.4 10E3/UL (ref 0–0.7)
EOSINOPHIL NFR BLD AUTO: 3 %
ERYTHROCYTE [DISTWIDTH] IN BLOOD BY AUTOMATED COUNT: 22.6 % (ref 10–15)
HCT VFR BLD AUTO: 18.2 % (ref 35–47)
HGB BLD-MCNC: 6.7 G/DL (ref 11.7–15.7)
IMM GRANULOCYTES # BLD: 0 10E3/UL
IMM GRANULOCYTES NFR BLD: 0 %
INTERPRETATION ECG - MUSE: NORMAL
LYMPHOCYTES # BLD AUTO: 2.1 10E3/UL (ref 0.8–5.3)
LYMPHOCYTES NFR BLD AUTO: 18 %
MCH RBC QN AUTO: 30.3 PG (ref 26.5–33)
MCHC RBC AUTO-ENTMCNC: 36.8 G/DL (ref 31.5–36.5)
MCV RBC AUTO: 82 FL (ref 78–100)
MONOCYTES # BLD AUTO: 0.8 10E3/UL (ref 0–1.3)
MONOCYTES NFR BLD AUTO: 7 %
NEUTROPHILS # BLD AUTO: 7.8 10E3/UL (ref 1.6–8.3)
NEUTROPHILS NFR BLD AUTO: 69 %
NRBC # BLD AUTO: 0.2 10E3/UL
NRBC BLD AUTO-RTO: 2 /100
P AXIS - MUSE: 70 DEGREES
PLATELET # BLD AUTO: 439 10E3/UL (ref 150–450)
PR INTERVAL - MUSE: 156 MS
QRS DURATION - MUSE: 78 MS
QT - MUSE: 412 MS
QTC - MUSE: 481 MS
R AXIS - MUSE: 46 DEGREES
RBC # BLD AUTO: 2.21 10E6/UL (ref 3.8–5.2)
RETICS # AUTO: 0.54 10E6/UL (ref 0.03–0.1)
RETICS/RBC NFR AUTO: 20.9 % (ref 0.5–2)
SYSTOLIC BLOOD PRESSURE - MUSE: NORMAL MMHG
T AXIS - MUSE: 29 DEGREES
TROPONIN T SERPL HS-MCNC: <6 NG/L
VENTRICULAR RATE- MUSE: 82 BPM
WBC # BLD AUTO: 11.4 10E3/UL (ref 4–11)

## 2025-05-07 PROCEDURE — 250N000011 HC RX IP 250 OP 636: Mod: JZ | Performed by: FAMILY MEDICINE

## 2025-05-07 PROCEDURE — 96374 THER/PROPH/DIAG INJ IV PUSH: CPT | Performed by: FAMILY MEDICINE

## 2025-05-07 PROCEDURE — 99285 EMERGENCY DEPT VISIT HI MDM: CPT | Performed by: FAMILY MEDICINE

## 2025-05-07 PROCEDURE — 93005 ELECTROCARDIOGRAM TRACING: CPT | Performed by: FAMILY MEDICINE

## 2025-05-07 PROCEDURE — 250N000011 HC RX IP 250 OP 636

## 2025-05-07 PROCEDURE — 85004 AUTOMATED DIFF WBC COUNT: CPT

## 2025-05-07 PROCEDURE — 36415 COLL VENOUS BLD VENIPUNCTURE: CPT

## 2025-05-07 PROCEDURE — 99284 EMERGENCY DEPT VISIT MOD MDM: CPT | Mod: 25 | Performed by: FAMILY MEDICINE

## 2025-05-07 PROCEDURE — 96376 TX/PRO/DX INJ SAME DRUG ADON: CPT | Performed by: FAMILY MEDICINE

## 2025-05-07 PROCEDURE — 258N000003 HC RX IP 258 OP 636

## 2025-05-07 PROCEDURE — 96375 TX/PRO/DX INJ NEW DRUG ADDON: CPT | Performed by: FAMILY MEDICINE

## 2025-05-07 PROCEDURE — 85045 AUTOMATED RETICULOCYTE COUNT: CPT

## 2025-05-07 PROCEDURE — 84484 ASSAY OF TROPONIN QUANT: CPT

## 2025-05-07 PROCEDURE — 93010 ELECTROCARDIOGRAM REPORT: CPT | Performed by: FAMILY MEDICINE

## 2025-05-07 PROCEDURE — 96361 HYDRATE IV INFUSION ADD-ON: CPT | Performed by: FAMILY MEDICINE

## 2025-05-07 RX ORDER — OXYCODONE HYDROCHLORIDE 5 MG/1
TABLET ORAL
COMMUNITY
Start: 2025-03-24

## 2025-05-07 RX ORDER — ONDANSETRON 2 MG/ML
8 INJECTION INTRAMUSCULAR; INTRAVENOUS ONCE
Status: COMPLETED | OUTPATIENT
Start: 2025-05-07 | End: 2025-05-07

## 2025-05-07 RX ORDER — HEPARIN SODIUM,PORCINE 10 UNIT/ML
5-10 VIAL (ML) INTRAVENOUS
Status: DISCONTINUED | OUTPATIENT
Start: 2025-05-07 | End: 2025-05-07 | Stop reason: HOSPADM

## 2025-05-07 RX ORDER — HYDROMORPHONE HYDROCHLORIDE 1 MG/ML
1 INJECTION, SOLUTION INTRAMUSCULAR; INTRAVENOUS; SUBCUTANEOUS ONCE
Status: COMPLETED | OUTPATIENT
Start: 2025-05-07 | End: 2025-05-07

## 2025-05-07 RX ORDER — HYDROMORPHONE HYDROCHLORIDE 1 MG/ML
1 INJECTION, SOLUTION INTRAMUSCULAR; INTRAVENOUS; SUBCUTANEOUS
Status: DISCONTINUED | OUTPATIENT
Start: 2025-05-07 | End: 2025-05-07 | Stop reason: HOSPADM

## 2025-05-07 RX ORDER — OXYCODONE HYDROCHLORIDE 10 MG/1
10 TABLET ORAL EVERY 6 HOURS PRN
Qty: 12 TABLET | Refills: 0 | Status: SHIPPED | OUTPATIENT
Start: 2025-05-08

## 2025-05-07 RX ORDER — HEPARIN SODIUM (PORCINE) LOCK FLUSH IV SOLN 100 UNIT/ML 100 UNIT/ML
5-10 SOLUTION INTRAVENOUS
Status: DISCONTINUED | OUTPATIENT
Start: 2025-05-07 | End: 2025-05-07

## 2025-05-07 RX ORDER — HYDROMORPHONE HYDROCHLORIDE 1 MG/ML
1 INJECTION, SOLUTION INTRAMUSCULAR; INTRAVENOUS; SUBCUTANEOUS ONCE
Refills: 0 | Status: COMPLETED | OUTPATIENT
Start: 2025-05-07 | End: 2025-05-07

## 2025-05-07 RX ORDER — HEPARIN SODIUM (PORCINE) LOCK FLUSH IV SOLN 100 UNIT/ML 100 UNIT/ML
5-10 SOLUTION INTRAVENOUS
Status: DISCONTINUED | OUTPATIENT
Start: 2025-05-07 | End: 2025-05-07 | Stop reason: HOSPADM

## 2025-05-07 RX ORDER — HEPARIN SODIUM,PORCINE 10 UNIT/ML
5-10 VIAL (ML) INTRAVENOUS EVERY 24 HOURS
Status: DISCONTINUED | OUTPATIENT
Start: 2025-05-07 | End: 2025-05-07 | Stop reason: HOSPADM

## 2025-05-07 RX ORDER — KETOROLAC TROMETHAMINE 30 MG/ML
30 INJECTION, SOLUTION INTRAMUSCULAR; INTRAVENOUS ONCE
Status: COMPLETED | OUTPATIENT
Start: 2025-05-07 | End: 2025-05-07

## 2025-05-07 RX ADMIN — ONDANSETRON 8 MG: 2 INJECTION INTRAMUSCULAR; INTRAVENOUS at 15:04

## 2025-05-07 RX ADMIN — KETOROLAC TROMETHAMINE 30 MG: 30 INJECTION, SOLUTION INTRAMUSCULAR at 15:08

## 2025-05-07 RX ADMIN — HYDROMORPHONE HYDROCHLORIDE 1 MG: 1 INJECTION, SOLUTION INTRAMUSCULAR; INTRAVENOUS; SUBCUTANEOUS at 15:02

## 2025-05-07 RX ADMIN — SODIUM CHLORIDE, SODIUM LACTATE, POTASSIUM CHLORIDE, AND CALCIUM CHLORIDE 1000 ML: .6; .31; .03; .02 INJECTION, SOLUTION INTRAVENOUS at 15:08

## 2025-05-07 RX ADMIN — HYDROMORPHONE HYDROCHLORIDE 1 MG: 1 INJECTION, SOLUTION INTRAMUSCULAR; INTRAVENOUS; SUBCUTANEOUS at 16:18

## 2025-05-07 RX ADMIN — HYDROMORPHONE HYDROCHLORIDE 1 MG: 1 INJECTION, SOLUTION INTRAMUSCULAR; INTRAVENOUS; SUBCUTANEOUS at 17:43

## 2025-05-07 RX ADMIN — HEPARIN SODIUM (PORCINE) LOCK FLUSH IV SOLN 100 UNIT/ML 5 ML: 100 SOLUTION at 18:49

## 2025-05-07 ASSESSMENT — ACTIVITIES OF DAILY LIVING (ADL)
ADLS_ACUITY_SCORE: 58

## 2025-05-07 ASSESSMENT — ENCOUNTER SYMPTOMS: SICKLE CELL PAIN: 1

## 2025-05-07 NOTE — DISCHARGE INSTRUCTIONS
Continue your home medications and follow-up with your hematology clinic and/or infusion center as needed.

## 2025-05-07 NOTE — TELEPHONE ENCOUNTER
Narcotic Refill Request  Medication(s) requested:  Oxycodone 10mg tab  Person Requesting Refill: Jennifer  What pain is the medication treating: sickle cell pain  How is the medication being taken?: as needed  Does pt have enough for today?  Yes. Due for weekly fill tomorrow, 5/8, and requesting today so it is ready for  tomorrow.     Date of most recent appointment:  4/25/25  Any No Show Visits: none  Next appointment:   5/23/25  Last fill date and by whom:  5/1/25 by Patricia Mantilla    Reviewed:  no access    Send to provider: Patricia Mantilla and JOE McgeeCC

## 2025-05-07 NOTE — ED PROVIDER NOTES
ED Provider Note  [facility]      History     Chief Complaint   Patient presents with    Sickle Cell Pain Crisis     Most of the pain is in her back, woke her up from her sleep. Infusion center was unable to accommodate her. Patient took her medications at home without relief.        Sickle Cell Pain Crisis    Jennifer Cervantes is a 25 year old female who has a past medical history of sickle cell anemia and hemiplegia due to cerebral infarct presenting with middle back pain. Jennifer reports that pain started this morning when she woke up. She stated that she went through her home pain plan without any improvement. He stated that she called the infusion center but they were booked up for her. She reported that pain is deep abdominal pain that feels like a sharp stabbing pain. Pain prevents her from taking a deep breath. Patient denied fever/chills, nausea/vomiting, dysuria or hematuria. Jennifer states that she has had pain similar to this in the past.     Past Medical History  Past Medical History:   Diagnosis Date    Anxiety     Bleeding disorder     Blood clotting disorder     Cerebral infarction (H) 2015    Cognitive developmental delay     low IQ. 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 side (H)     right hand contractures    Iron overload due to repeated red blood cell transfusions     Migraines     Multiple subsegmental pulmonary emboli without acute cor pulmonale (H) 02/01/2021    Oppositional defiant behavior     Presence of intrauterine contraceptive device 2/18/2020    Superficial venous thrombosis of arm, right 03/25/2021    Uncomplicated asthma      Past Surgical History:   Procedure Laterality Date    AS INSERT TUNNELED CV 2 CATH W/O PORT/PUMP      CHOLECYSTECTOMY      CV RIGHT HEART CATH MEASUREMENTS RECORDED N/A 11/18/2021    Procedure: Right Heart Cath;   Surgeon: Jackson Stauffer MD;  Location:  HEART CARDIAC CATH LAB    INSERT PORT VASCULAR ACCESS Left 4/21/2021    Procedure: INSERTION, VASCULAR ACCESS PORT (NOT SURE ON SIDE UNTIL REMOVAL);  Surgeon: Rajan More MD;  Location: [facility] OR    IR CHEST PORT PLACEMENT > 5 YRS OF AGE  4/21/2021    IR CVC NON TUNNEL LINE REMOVAL  5/6/2021    IR CVC NON TUNNEL PLACEMENT > 5 YRS  04/07/2020    IR CVC NON TUNNEL PLACEMENT > 5 YRS  4/30/2021    IR CVC NON TUNNEL PLACEMENT > 5 YRS  9/7/2022    IR PORT REMOVAL LEFT  4/21/2021    REMOVE PORT VASCULAR ACCESS Left 4/21/2021    Procedure: REMOVAL, VASCULAR ACCESS PORT LEFT;  Surgeon: Rajan More MD;  Location: [facility] OR    REPAIR TENDON ELBOW Right 10/02/2019    Procedure: Right Elbow Flexor Lengthening, Flexor Pronator Slide Of Wrist and Finger, Thumb Adductor Lengthening;  Surgeon: Anai Franco MD;  Location: [facility] OR    TONSILLECTOMY Bilateral 10/02/2019    Procedure: Bilateral Tonsillectomy;  Surgeon: Farhana Guy MD;  Location: [facility] OR    ZZC BREAST REDUCTION (INCLUDES LIPO) TIER 3 Bilateral 04/23/2019     aspirin (ASA) 81 MG chewable tablet  deferasirox (JADENU) 360 MG tablet  Deferiprone, Twice Daily, 1000 MG TABS  hydroxyurea (HYDREA) 500 MG capsule  ibuprofen (ADVIL/MOTRIN) 800 MG tablet  oxyCODONE (ROXICODONE) 5 MG tablet  [START ON 5/8/2025] oxyCODONE IR (ROXICODONE) 10 MG tablet  pantoprazole (PROTONIX) 40 MG EC tablet  albuterol (PROVENTIL) (2.5 MG/3ML) 0.083% neb solution  budesonide-formoterol (SYMBICORT/BREYNA) 160-4.5 MCG/ACT Inhaler  diclofenac (VOLTAREN) 1 % topical gel  diphenhydrAMINE (BENADRYL) 50 MG capsule  EPINEPHrine (ANY BX GENERIC EQUIV) 0.3 MG/0.3ML injection 2-pack  gabapentin (NEURONTIN) 300 MG capsule  methocarbamol (ROBAXIN) 750 MG tablet  methylPREDNISolone (MEDROL) 32 MG tablet  naloxone (NARCAN) 4 MG/0.1ML nasal spray  naproxen (NAPROSYN) 500 MG tablet      Allergies   Allergen Reactions    Contrast Dye Angioedema      "Hives and breathing issues    Fish-Derived Products Hives    Seafood Hives    Adhesive Tape Hives     Primipore dressing causes hives    Gadolinium     Iodinated Contrast Media      Family History  Family History   Problem Relation Age of Onset    Sickle Cell Trait Mother     Hypertension Mother     Asthma Mother     Sickle Cell Trait Father     Glaucoma No family hx of     Macular Degeneration No family hx of     Diabetes No family hx of     Gout No family hx of      Social History   Social History     Tobacco Use    Smoking status: Never     Passive exposure: Never    Smokeless tobacco: Never   Substance Use Topics    Alcohol use: Not Currently     Alcohol/week: 0.0 standard drinks of alcohol    Drug use: Never      A complete review of systems was performed with pertinent positives and negatives noted in the HPI, and all other systems negative.    Physical Exam   BP: 123/80  Pulse: 88  Temp: 98.6  F (37  C)  Resp: 18  Height: 162.6 cm (5' 4\")  Weight: 71.7 kg (158 lb)  SpO2: 98 %  Physical Exam  Constitutional:       General: She is in acute distress (Appears uncomfortable, tearful).      Comments: tearful   HENT:      Head: Normocephalic and atraumatic.      Nose: Nose normal.      Mouth/Throat:      Mouth: Mucous membranes are moist.      Pharynx: Oropharynx is clear.   Cardiovascular:      Rate and Rhythm: Normal rate and regular rhythm.      Pulses: Normal pulses.      Heart sounds: Normal heart sounds.   Pulmonary:      Effort: Pulmonary effort is normal.      Breath sounds: Normal breath sounds.   Abdominal:      General: Abdomen is flat. Bowel sounds are normal. There is no distension.      Palpations: Abdomen is soft.      Tenderness: There is no abdominal tenderness. There is no right CVA tenderness, left CVA tenderness or guarding.   Musculoskeletal:         General: No swelling. Normal range of motion.      Lumbar back: Tenderness and bony tenderness present.        Back:    Skin:     General: Skin is " warm and dry.      Capillary Refill: Capillary refill takes less than 2 seconds.   Neurological:      Mental Status: She is alert. Mental status is at baseline.      Cranial Nerves: Cranial nerves 2-12 are intact.      Sensory: Sensation is intact.      Motor: Motor function is intact.      Deep Tendon Reflexes: Reflexes are normal and symmetric.           ED Course, Procedures, & Data      Procedures            EKG Interpretation:      Interpreted by Ifeanyi Valdez MD  Time reviewed: 1425  Symptoms at time of EKG: Mid back pain  Rhythm: normal sinus   Rate: Normal  Axis: Normal  Ectopy: none  Conduction: QTc 0.481  ST Segments/ T Waves: No ST-T wave changes and No acute ischemic changes  Q Waves: none  Comparison to prior: QTc slightly more prolonged than previous    Clinical Impression: Normal sinus rhythm with prolonged QT otherwise normal EKG                  Results for orders placed or performed during the hospital encounter of 05/07/25   Reticulocyte count     Status: Abnormal   Result Value Ref Range    % Reticulocyte 20.9 (H) 0.5 - 2.0 %    Absolute Reticulocyte 0.544 (H) 0.025 - 0.095 10e6/uL   Troponin T, High Sensitivity     Status: Normal   Result Value Ref Range    Troponin T, High Sensitivity <6 <=14 ng/L   CBC with platelets and differential     Status: Abnormal   Result Value Ref Range    WBC Count 11.4 (H) 4.0 - 11.0 10e3/uL    RBC Count 2.21 (L) 3.80 - 5.20 10e6/uL    Hemoglobin 6.7 (LL) 11.7 - 15.7 g/dL    Hematocrit 18.2 (L) 35.0 - 47.0 %    MCV 82 78 - 100 fL    MCH 30.3 26.5 - 33.0 pg    MCHC 36.8 (H) 31.5 - 36.5 g/dL    RDW 22.6 (H) 10.0 - 15.0 %    Platelet Count 439 150 - 450 10e3/uL    % Neutrophils 69 %    % Lymphocytes 18 %    % Monocytes 7 %    % Eosinophils 3 %    % Basophils 2 %    % Immature Granulocytes 0 %    NRBCs per 100 WBC 2 (H) <1 /100    Absolute Neutrophils 7.8 1.6 - 8.3 10e3/uL    Absolute Lymphocytes 2.1 0.8 - 5.3 10e3/uL    Absolute Monocytes 0.8 0.0 - 1.3 10e3/uL     Absolute Eosinophils 0.4 0.0 - 0.7 10e3/uL    Absolute Basophils 0.2 0.0 - 0.2 10e3/uL    Absolute Immature Granulocytes 0.0 <=0.4 10e3/uL    Absolute NRBCs 0.2 10e3/uL   EKG 12-lead, tracing only     Status: None (Preliminary result)   Result Value Ref Range    Systolic Blood Pressure  mmHg    Diastolic Blood Pressure  mmHg    Ventricular Rate 82 BPM    Atrial Rate 82 BPM    KS Interval 156 ms    QRS Duration 78 ms     ms    QTc 481 ms    P Axis 70 degrees    R AXIS 46 degrees    T Axis 29 degrees    Interpretation ECG Sinus rhythm  Prolonged QT  Abnormal ECG      CBC with platelets differential     Status: Abnormal    Narrative    The following orders were created for panel order CBC with platelets differential.  Procedure                               Abnormality         Status                     ---------                               -----------         ------                     CBC with platelets and ...[[id number]]  Abnormal            Final result                 Please view results for these tests on the individual orders.     Medications   HYDROmorphone (PF) (DILAUDID) injection 1 mg (1 mg Intravenous $Given 5/7/25 4047)   sodium chloride (PF) 0.9% PF flush 10-20 mL (has no administration in time range)   sodium chloride (PF) 0.9% PF flush 10-20 mL (has no administration in time range)   sodium chloride (PF) 0.9% PF flush 10-20 mL ( Intracatheter Not Given 5/7/25 1713)   heparin lock flush 10 unit/mL injection 5-10 mL (has no administration in time range)   heparin lock flush 10 unit/mL injection 5-10 mL ( Intracatheter Canceled Entry 5/7/25 3483)   heparin lock flush 100 unit/mL injection 5-10 mL (has no administration in time range)   HYDROmorphone (PF) (DILAUDID) injection 1 mg (has no administration in time range)   lactated ringers BOLUS 1,000 mL (1,000 mLs Intravenous $New Bag 5/7/25 4274)   ketorolac (TORADOL) injection 30 mg (30 mg Intravenous $Given 5/7/25 2741)   ondansetron (ZOFRAN)  injection 8 mg (8 mg Intravenous $Given 5/7/25 1509)   HYDROmorphone (PF) (DILAUDID) injection 1 mg (1 mg Intravenous $Given 5/7/25 1611)     Labs Ordered and Resulted from Time of ED Arrival to Time of ED Departure   RETICULOCYTE COUNT - Abnormal       Result Value    % Reticulocyte 20.9 (*)     Absolute Reticulocyte 0.544 (*)    CBC WITH PLATELETS AND DIFFERENTIAL - Abnormal    WBC Count 11.4 (*)     RBC Count 2.21 (*)     Hemoglobin 6.7 (*)     Hematocrit 18.2 (*)     MCV 82      MCH 30.3      MCHC 36.8 (*)     RDW 22.6 (*)     Platelet Count 439      % Neutrophils 69      % Lymphocytes 18      % Monocytes 7      % Eosinophils 3      % Basophils 2      % Immature Granulocytes 0      NRBCs per 100 WBC 2 (*)     Absolute Neutrophils 7.8      Absolute Lymphocytes 2.1      Absolute Monocytes 0.8      Absolute Eosinophils 0.4      Absolute Basophils 0.2      Absolute Immature Granulocytes 0.0      Absolute NRBCs 0.2     TROPONIN T, HIGH SENSITIVITY - Normal    Troponin T, High Sensitivity <6     ROUTINE UA WITH MICROSCOPIC REFLEX TO CULTURE     No orders to display          Critical care was not performed.     Medical Decision Making  The patient's presentation was of moderate complexity (a chronic illness mild to moderate exacerbation, progression, or side effect of treatment).    The patient's evaluation involved:  review of external note(s) from 3+ sources (review of patient's most recent 3 ER visits under similar circumstance)  review of 3+ test result(s) ordered prior to this encounter (review of patient's prior multiple hemoglobin levels multiple retake count levels multiple EKGs for comparison)  ordering and/or review of 3+ test(s) in this encounter (see separate area of note for details)    The patient's management necessitated moderate risk (prescription drug management including medications given in the ED) and high risk (a parenteral controlled substance).    Assessment & Plan    Patient with a history of  "sickle cell disease, now presenting with acute mid to low back pain typical of prior sickle cell crisis.  Differential diagnosis did include sickle cell pain, bone infarct, UTI, pyelonephritis, pancreatitis, atypical presentation of ACS, acute chest syndrome.  Exam patient's vitals are unremarkable.  She appears uncomfortable though in no distress.  She has reproducible pain diffusely in the lower back without point vertebral tenderness.  Her labs again are consistent with sickle cell disease appear to be essentially at baseline.  EKG and troponin unremarkable, she is not having chest pain.  Reported marked improvement with her protocol for sickle cell pain, was able to eat \"[employer] eats\" that she ordered in the emergency department.  Abdominal exam is benign and she appears appropriate to be discharged home for further outpatient follow-up.    I have reviewed the nursing notes. I have reviewed the findings, diagnosis, plan and need for follow up with the patient.    New Prescriptions    No medications on file       Final diagnoses:   Sickle cell pain crisis (H)       Ifeanyi Valdez MD  [facility] EMERGENCY DEPARTMENT  5/7/2025     Ifeanyi Valdez MD  05/07/25 1738    "

## 2025-05-07 NOTE — ED TRIAGE NOTES
Patient is here in the emergency room to be seen for sickle cell pain crisis. Patient reports her pain today is in her back. Patient reports she woke up from her sleep with sharp pains. Patient reports she cannot get deep breaths in without sharp pain. Patient reports she took all her medications this morning. Patient tried to call the infusion center to get in there but they were full today. Patient reports the infusion center has been full for a couple of weeks now.

## 2025-05-07 NOTE — TELEPHONE ENCOUNTER
Oncology Nurse Triage - Sickle Cell Pain Crisis:    Situation: Jennifer  calling about Sickle Cell Pain Crisis, requesting to be added on for IV fluids and pain medicine    Background:   Patient's last infusion was 5/6/25  Last clinic visit date: 4/25/25 aida/ Patricia Mantilla  Does patient have active treatment plan? Yes    Assessment of Symptoms:  Onset/Duration of symptoms: 1 day    Is it typical sickle cell pain? Yes  Location: middle of back   Character: Sharp  Intensity: 9/10    Any radiation of pain, numbness, tingling, weakness, warmth, swelling, discoloration of arms or legs?No    Fever? No    Chest Pain Present: No    Shortness of breath: No    Other home therapies tried: HEAT/HEATING PAD and WARM BATH     Last home medication taken and when: last night around 10pm took Oxycodone    Any Refills Needed?: Yes- Oxycodone for 5/8/25    Does patient have transportation & length of time to get to clinic: No- needs transportation, unless something within 30 minutes time comes up, in which she could find a ride in.     Recommendations:   Added to infusion wait list- per pt's therapy plan, pt can come in 3 consecutive days without contacting provider for approval.

## 2025-05-07 NOTE — TELEPHONE ENCOUNTER
Jennifer called back checking on infusion appt availability.  As of 1130 No infusion appts available.

## 2025-05-08 ENCOUNTER — VIRTUAL VISIT (OUTPATIENT)
Dept: PSYCHOLOGY | Facility: CLINIC | Age: 25
End: 2025-05-08
Payer: MEDICAID

## 2025-05-08 ENCOUNTER — NURSE TRIAGE (OUTPATIENT)
Dept: ONCOLOGY | Facility: CLINIC | Age: 25
End: 2025-05-08
Payer: MEDICAID

## 2025-05-08 ENCOUNTER — INFUSION THERAPY VISIT (OUTPATIENT)
Dept: ONCOLOGY | Facility: CLINIC | Age: 25
End: 2025-05-08
Attending: REGISTERED NURSE
Payer: MEDICAID

## 2025-05-08 VITALS
DIASTOLIC BLOOD PRESSURE: 85 MMHG | TEMPERATURE: 98.2 F | SYSTOLIC BLOOD PRESSURE: 121 MMHG | RESPIRATION RATE: 18 BRPM | WEIGHT: 158.9 LBS | HEART RATE: 95 BPM | OXYGEN SATURATION: 93 % | BODY MASS INDEX: 27.28 KG/M2

## 2025-05-08 DIAGNOSIS — D57.00 SICKLE CELL PAIN CRISIS (H): Primary | ICD-10-CM

## 2025-05-08 DIAGNOSIS — D57.1 HB-SS DISEASE WITHOUT CRISIS (H): ICD-10-CM

## 2025-05-08 DIAGNOSIS — G89.4 CHRONIC PAIN ASSOCIATED WITH SIGNIFICANT PSYCHOSOCIAL DYSFUNCTION: Primary | ICD-10-CM

## 2025-05-08 DIAGNOSIS — G81.10 SPASTIC HEMIPLEGIA, UNSPECIFIED ETIOLOGY, UNSPECIFIED LATERALITY (H): ICD-10-CM

## 2025-05-08 DIAGNOSIS — F54 PSYCHOLOGICAL AND BEHAVIORAL FACTORS ASSOCIATED WITH DISORDERS OR DISEASES CLASSIFIED ELSEWHERE: ICD-10-CM

## 2025-05-08 DIAGNOSIS — F34.1 PERSISTENT DEPRESSIVE DISORDER: ICD-10-CM

## 2025-05-08 PROCEDURE — 90837 PSYTX W PT 60 MINUTES: CPT | Mod: 95 | Performed by: PSYCHOLOGIST

## 2025-05-08 PROCEDURE — 258N000003 HC RX IP 258 OP 636: Performed by: PEDIATRICS

## 2025-05-08 PROCEDURE — 250N000011 HC RX IP 250 OP 636: Performed by: PEDIATRICS

## 2025-05-08 RX ORDER — ONDANSETRON 2 MG/ML
8 INJECTION INTRAMUSCULAR; INTRAVENOUS EVERY 6 HOURS PRN
Status: DISCONTINUED | OUTPATIENT
Start: 2025-05-08 | End: 2025-05-08 | Stop reason: HOSPADM

## 2025-05-08 RX ORDER — ONDANSETRON 2 MG/ML
8 INJECTION INTRAMUSCULAR; INTRAVENOUS EVERY 6 HOURS PRN
Start: 2025-08-01

## 2025-05-08 RX ORDER — ONDANSETRON 4 MG/1
8 TABLET, FILM COATED ORAL
Start: 2025-08-01

## 2025-05-08 RX ORDER — HEPARIN SODIUM (PORCINE) LOCK FLUSH IV SOLN 100 UNIT/ML 100 UNIT/ML
5 SOLUTION INTRAVENOUS
OUTPATIENT
Start: 2025-08-01

## 2025-05-08 RX ORDER — HEPARIN SODIUM (PORCINE) LOCK FLUSH IV SOLN 100 UNIT/ML 100 UNIT/ML
5 SOLUTION INTRAVENOUS
Status: DISCONTINUED | OUTPATIENT
Start: 2025-05-08 | End: 2025-05-08 | Stop reason: HOSPADM

## 2025-05-08 RX ORDER — KETOROLAC TROMETHAMINE 30 MG/ML
30 INJECTION, SOLUTION INTRAMUSCULAR; INTRAVENOUS ONCE
Status: COMPLETED | OUTPATIENT
Start: 2025-05-08 | End: 2025-05-08

## 2025-05-08 RX ORDER — HEPARIN SODIUM,PORCINE 10 UNIT/ML
5 VIAL (ML) INTRAVENOUS
OUTPATIENT
Start: 2025-08-01

## 2025-05-08 RX ORDER — KETOROLAC TROMETHAMINE 30 MG/ML
30 INJECTION, SOLUTION INTRAMUSCULAR; INTRAVENOUS ONCE
Start: 2025-08-01 | End: 2025-08-01

## 2025-05-08 RX ORDER — DIPHENHYDRAMINE HCL 25 MG
50 CAPSULE ORAL
Start: 2025-08-01

## 2025-05-08 RX ADMIN — HYDROMORPHONE HYDROCHLORIDE 1 MG: 1 INJECTION, SOLUTION INTRAMUSCULAR; INTRAVENOUS; SUBCUTANEOUS at 16:09

## 2025-05-08 RX ADMIN — HYDROMORPHONE HYDROCHLORIDE 1 MG: 1 INJECTION, SOLUTION INTRAMUSCULAR; INTRAVENOUS; SUBCUTANEOUS at 14:08

## 2025-05-08 RX ADMIN — HYDROMORPHONE HYDROCHLORIDE 1 MG: 1 INJECTION, SOLUTION INTRAMUSCULAR; INTRAVENOUS; SUBCUTANEOUS at 15:05

## 2025-05-08 RX ADMIN — KETOROLAC TROMETHAMINE 30 MG: 30 INJECTION, SOLUTION INTRAMUSCULAR; INTRAVENOUS at 14:15

## 2025-05-08 RX ADMIN — HEPARIN 5 ML: 100 SYRINGE at 16:34

## 2025-05-08 RX ADMIN — ONDANSETRON 8 MG: 2 INJECTION INTRAMUSCULAR; INTRAVENOUS at 14:11

## 2025-05-08 RX ADMIN — SODIUM CHLORIDE, SODIUM LACTATE, POTASSIUM CHLORIDE, AND CALCIUM CHLORIDE 1000 ML: .6; .31; .03; .02 INJECTION, SOLUTION INTRAVENOUS at 14:02

## 2025-05-08 NOTE — TELEPHONE ENCOUNTER
Oncology Nurse Triage - Sickle Cell Pain Crisis:    Situation: Jennifer  calling about Sickle Cell Pain Crisis, requesting to be added on for IV fluids and pain medicine    Background:     Patient's last infusion was 5/07/25 ED  Last clinic visit date: 4/25/25 w/ Patricia Mantilla  Does patient have active treatment plan? Yes    Assessment of Symptoms:  Onset/Duration of symptoms: 1 day    Is it typical sickle cell pain? Yes  Location: back  Character: Sharp  Intensity: 7/10    Any radiation of pain, numbness, tingling, weakness, warmth, swelling, discoloration of arms or legs?No    Fever? No    Chest Pain Present: No    Shortness of breath: No    Other home therapies tried: HEAT/HEATING PAD and WARM BATH     Last home medication taken and when: Yesterday in the morning, ibuprofen    Any Refills Needed?: No    Does patient have transportation & length of time to get to clinic: Yes, 20 min.      Recommendations:   Reviewed if she does not hear from the infusion center by 2pm then she will not be able to get in for an infusion today. If symptoms worsen while waiting for call back, and/or you experience fever, chills, SOB, chest pain, cough, n/v, dizziness, numbness, swelling, discoloration of extremities, then seek emergency evaluation in Emergency Department.     Secure message to Patricia Mantilla for approval    [id number] Patricia approving adding on pt to wait list.    1151 Call placed to pt to offer appt with [facility] at 1pm. Pt confirmed she can make appt. Message sent to [facility] to assist with scheduling.

## 2025-05-08 NOTE — PATIENT INSTRUCTIONS
[facility] Triage and after hours / weekends / holidays:  [phone] option 5, option 2    Please call the triage or after hours line if you experience a temperature greater than or equal to 100.4, shaking chills, have uncontrolled nausea, vomiting and/or diarrhea, dizziness, shortness of breath, chest pain, bleeding, unexplained bruising, or if you have any other new/concerning symptoms, questions or concerns.      If you are having any concerning symptoms or wish to speak to a provider before your next infusion visit, please call triage to notify your care team so we can adequately serve you.     If you need a refill on a narcotic prescription or other medication, please call before your infusion appointment.      May 2025      Brett Monday Tuesday Wednesday Thursday Friday Saturday                       1    Neurotoxin   7:45 AM   (60 min.)   Katherine Pierce MD   [facility] 2    Admission  10:37 AM   Lisa Coombs MD   [facility] Emergency Department   (Discharge: 5/2/2025) 3       4    Admission  12:19 AM   Court Ring MD   [facility] Emergency Department   (Discharge: 5/4/2025) 5     6    Infusion 180  10:00 AM   (180 min.)   [facility]ION NURSE   [facility] 7    Return Visit   9:45 AM   (30 min.)   Suraj Case MD   [facility] [place]    Admission   2:00 PM   Ifeanyi Valdez MD   [facility] Emergency Department   (Discharge: 5/7/2025) 8    Adult Psychotherapy   9:45 AM   (60 min.)   Shandra Caruso, PhD [facility]    Infusion 120   1:00 PM   (120 min.)   [facility] INF[facility]ION NURSE   [facility] 9     10       11     12     13    Pharmacist Visit   1:00 PM   (30 min.)   [place] RPH   [facility] 14    Extremity Treatment   9:40 AM   (40 min.)   Jose  Emma CAMPBELL PT   [facility] 15    Adult Psychotherapy   9:45 AM   (60 min.)   Shandra Caruso, PhD [facility] 16     17       18     19     20     21     22    Adult Psychotherapy   9:45 AM   (60 min.)   Shandra Caruso, PhD [facility] 23    Lab Central  11:00 AM   (15 min.)   [facility]   [facility]    Return Active Treatment  11:45 AM   (45 min.)   Patricia Mantilla CNP   [facility]    Infusion 240  12:30 PM   (240 min.)   [facility] INF[facility]ION NURSE   [facility] 24       25     26     27     28    Return Visit   7:45 AM   (30 min.)   Suraj Case MD   [facility] [place]    Extremity Treatment   9:40 AM   (40 min.)   Emma Garcia, PT   [facility] 29    Adult Psychotherapy   9:45 AM   (60 min.)   Shandra Caruso, PhD [facility] 30 31 June 2025 Sunday Monday Tuesday Wednesday Thursday Friday Saturday   1     2    MR ABDOMEN W/O CONTRAST   6:20 AM   (40 min.)   [email or web address]   [facility] 3     4     5     6     7       8     9     10     11    Extremity Treatment   9:40 AM   (40 min.)   Emma Garcia PT   [facility] 12     13     14       15     16     17    New Patient   9:05 AM   (40 min.)   Aurelia Salcido MD   [facility] [place] 18     19     20    Lab Central  11:30 AM   (15 min.)   [facility]   [facility]    Infusion 240  12:00 PM   (240 min.)   [facility] INFUSION NURSE   [facility] 21       22     23     24     25    Extremity Treatment    9:40 AM   (40 min.)   Emma Garcia, PT   [facility] 26     27     28       29     30                                                 Recent Results (from the past 24 hours)   Reticulocyte count    Collection Time: 05/07/25  3:01 PM   Result Value Ref Range    % Reticulocyte 20.9 (H) 0.5 - 2.0 %    Absolute Reticulocyte 0.544 (H) 0.025 - 0.095 10e6/uL   Troponin T, High Sensitivity    Collection Time: 05/07/25  3:01 PM   Result Value Ref Range    Troponin T, High Sensitivity <6 <=14 ng/L   CBC with platelets and differential    Collection Time: 05/07/25  3:01 PM   Result Value Ref Range    WBC Count 11.4 (H) 4.0 - 11.0 10e3/uL    RBC Count 2.21 (L) 3.80 - 5.20 10e6/uL    Hemoglobin 6.7 (LL) 11.7 - 15.7 g/dL    Hematocrit 18.2 (L) 35.0 - 47.0 %    MCV 82 78 - 100 fL    MCH 30.3 26.5 - 33.0 pg    MCHC 36.8 (H) 31.5 - 36.5 g/dL    RDW 22.6 (H) 10.0 - 15.0 %    Platelet Count 439 150 - 450 10e3/uL    % Neutrophils 69 %    % Lymphocytes 18 %    % Monocytes 7 %    % Eosinophils 3 %    % Basophils 2 %    % Immature Granulocytes 0 %    NRBCs per 100 WBC 2 (H) <1 /100    Absolute Neutrophils 7.8 1.6 - 8.3 10e3/uL    Absolute Lymphocytes 2.1 0.8 - 5.3 10e3/uL    Absolute Monocytes 0.8 0.0 - 1.3 10e3/uL    Absolute Eosinophils 0.4 0.0 - 0.7 10e3/uL    Absolute Basophils 0.2 0.0 - 0.2 10e3/uL    Absolute Immature Granulocytes 0.0 <=0.4 10e3/uL    Absolute NRBCs 0.2 10e3/uL

## 2025-05-08 NOTE — PROGRESS NOTES
Infusion Nursing Note:  Jennifer Cervantes presents today for IVF & pain medications.    Patient seen by provider today: No   present during visit today: Not Applicable.    Note: Jennifer presents today with pain 7/10 in the middle of her back. She went to the ER yesterday and received IV dilaudid. She reports this helped for a while but the pain then returned overnight and even with ibuprofen she could not get relief. She denies chest pain, SOB, edema, chills, fever, nausea, vomiting, or any other complaints. She wishes to proceed with IVF and pain medications today, patient was offered heat for her back but she declined.     After 3 doses of IV dilaudid and IV Toradol patient reports pain is 3/10. Pt feels okay to discharge home. Pt discharging home via medical transport that patient calls herself.     Intravenous Access:  Implanted Port - accessed during this visit, covered with IV clear dressing per patient preference.    Treatment Conditions:  Not Applicable.      Post Infusion Assessment:  Patient tolerated infusion without incident.  Blood return noted pre and post infusion.  Site patent and intact, free from redness, edema or discomfort.       Discharge Plan:   Prescription refills given for Oxycodone.  Discharge instructions reviewed with: Patient.  Patient and/or family verbalized understanding of discharge instructions and all questions answered.  AVS to patient via [employer].  Patient will return 5/23/25 for next appointment.   Patient discharged in stable condition accompanied by: self.  Departure Mode: Ambulatory.      Alin Mike RN

## 2025-05-09 ENCOUNTER — APPOINTMENT (OUTPATIENT)
Dept: ULTRASOUND IMAGING | Facility: CLINIC | Age: 25
End: 2025-05-09
Attending: EMERGENCY MEDICINE
Payer: MEDICAID

## 2025-05-09 ENCOUNTER — HOSPITAL ENCOUNTER (EMERGENCY)
Facility: CLINIC | Age: 25
Discharge: HOME | End: 2025-05-10
Attending: EMERGENCY MEDICINE | Admitting: EMERGENCY MEDICINE
Payer: MEDICAID

## 2025-05-09 ENCOUNTER — APPOINTMENT (OUTPATIENT)
Dept: GENERAL RADIOLOGY | Facility: CLINIC | Age: 25
End: 2025-05-09
Attending: EMERGENCY MEDICINE
Payer: MEDICAID

## 2025-05-09 DIAGNOSIS — D57.00 SICKLE CELL PAIN CRISIS (H): ICD-10-CM

## 2025-05-09 DIAGNOSIS — M25.552 HIP PAIN, LEFT: ICD-10-CM

## 2025-05-09 LAB
ALBUMIN SERPL BCG-MCNC: 4.4 G/DL (ref 3.5–5.2)
ALP SERPL-CCNC: 59 U/L (ref 40–150)
ALT SERPL W P-5'-P-CCNC: 31 U/L (ref 0–50)
ANION GAP SERPL CALCULATED.3IONS-SCNC: 11 MMOL/L (ref 7–15)
AST SERPL W P-5'-P-CCNC: 65 U/L (ref 0–45)
BASOPHILS # BLD AUTO: 0.2 10E3/UL (ref 0–0.2)
BASOPHILS NFR BLD AUTO: 2 %
BILIRUB SERPL-MCNC: 3.7 MG/DL
BUN SERPL-MCNC: 7.1 MG/DL (ref 6–20)
CALCIUM SERPL-MCNC: 8.9 MG/DL (ref 8.8–10.4)
CHLORIDE SERPL-SCNC: 103 MMOL/L (ref 98–107)
CREAT SERPL-MCNC: 0.56 MG/DL (ref 0.51–0.95)
CRP SERPL-MCNC: <3 MG/L
EGFRCR SERPLBLD CKD-EPI 2021: >90 ML/MIN/1.73M2
EOSINOPHIL # BLD AUTO: 0.3 10E3/UL (ref 0–0.7)
EOSINOPHIL NFR BLD AUTO: 3 %
ERYTHROCYTE [DISTWIDTH] IN BLOOD BY AUTOMATED COUNT: 24.6 % (ref 10–15)
ERYTHROCYTE [SEDIMENTATION RATE] IN BLOOD BY WESTERGREN METHOD: 14 MM/HR (ref 0–20)
GLUCOSE SERPL-MCNC: 91 MG/DL (ref 70–99)
HCO3 SERPL-SCNC: 23 MMOL/L (ref 22–29)
HCT VFR BLD AUTO: 18.6 % (ref 35–47)
HGB BLD-MCNC: 6.6 G/DL (ref 11.7–15.7)
IMM GRANULOCYTES # BLD: 0.1 10E3/UL
IMM GRANULOCYTES NFR BLD: 1 %
LYMPHOCYTES # BLD AUTO: 2.1 10E3/UL (ref 0.8–5.3)
LYMPHOCYTES NFR BLD AUTO: 19 %
MCH RBC QN AUTO: 30 PG (ref 26.5–33)
MCHC RBC AUTO-ENTMCNC: 35.5 G/DL (ref 31.5–36.5)
MCV RBC AUTO: 85 FL (ref 78–100)
MONOCYTES # BLD AUTO: 0.9 10E3/UL (ref 0–1.3)
MONOCYTES NFR BLD AUTO: 8 %
NEUTROPHILS # BLD AUTO: 7.5 10E3/UL (ref 1.6–8.3)
NEUTROPHILS NFR BLD AUTO: 68 %
NRBC # BLD AUTO: 0.4 10E3/UL
NRBC BLD AUTO-RTO: 4 /100
PLATELET # BLD AUTO: 442 10E3/UL (ref 150–450)
POTASSIUM SERPL-SCNC: 4.2 MMOL/L (ref 3.4–5.3)
PROT SERPL-MCNC: 7.4 G/DL (ref 6.4–8.3)
RBC # BLD AUTO: 2.2 10E6/UL (ref 3.8–5.2)
RETICS # AUTO: 0.79 10E6/UL (ref 0.03–0.1)
RETICS/RBC NFR AUTO: 26.7 % (ref 0.5–2)
SODIUM SERPL-SCNC: 137 MMOL/L (ref 135–145)
WBC # BLD AUTO: 11 10E3/UL (ref 4–11)

## 2025-05-09 PROCEDURE — 99285 EMERGENCY DEPT VISIT HI MDM: CPT | Mod: 25 | Performed by: EMERGENCY MEDICINE

## 2025-05-09 PROCEDURE — 85652 RBC SED RATE AUTOMATED: CPT | Performed by: EMERGENCY MEDICINE

## 2025-05-09 PROCEDURE — 82040 ASSAY OF SERUM ALBUMIN: CPT | Performed by: EMERGENCY MEDICINE

## 2025-05-09 PROCEDURE — 99285 EMERGENCY DEPT VISIT HI MDM: CPT | Performed by: EMERGENCY MEDICINE

## 2025-05-09 PROCEDURE — 86140 C-REACTIVE PROTEIN: CPT | Performed by: EMERGENCY MEDICINE

## 2025-05-09 PROCEDURE — 93970 EXTREMITY STUDY: CPT

## 2025-05-09 PROCEDURE — 73502 X-RAY EXAM HIP UNI 2-3 VIEWS: CPT

## 2025-05-09 PROCEDURE — 96374 THER/PROPH/DIAG INJ IV PUSH: CPT | Performed by: EMERGENCY MEDICINE

## 2025-05-09 PROCEDURE — 85045 AUTOMATED RETICULOCYTE COUNT: CPT | Performed by: EMERGENCY MEDICINE

## 2025-05-09 PROCEDURE — 96376 TX/PRO/DX INJ SAME DRUG ADON: CPT | Performed by: EMERGENCY MEDICINE

## 2025-05-09 PROCEDURE — 96375 TX/PRO/DX INJ NEW DRUG ADDON: CPT | Performed by: EMERGENCY MEDICINE

## 2025-05-09 PROCEDURE — 250N000011 HC RX IP 250 OP 636: Mod: JZ | Performed by: EMERGENCY MEDICINE

## 2025-05-09 PROCEDURE — 36415 COLL VENOUS BLD VENIPUNCTURE: CPT | Performed by: EMERGENCY MEDICINE

## 2025-05-09 PROCEDURE — 85025 COMPLETE CBC W/AUTO DIFF WBC: CPT | Performed by: EMERGENCY MEDICINE

## 2025-05-09 RX ORDER — KETOROLAC TROMETHAMINE 30 MG/ML
30 INJECTION, SOLUTION INTRAMUSCULAR; INTRAVENOUS ONCE
Status: COMPLETED | OUTPATIENT
Start: 2025-05-09 | End: 2025-05-09

## 2025-05-09 RX ORDER — HYDROMORPHONE HYDROCHLORIDE 1 MG/ML
1 INJECTION, SOLUTION INTRAMUSCULAR; INTRAVENOUS; SUBCUTANEOUS
Status: COMPLETED | OUTPATIENT
Start: 2025-05-09 | End: 2025-05-09

## 2025-05-09 RX ORDER — HEPARIN SODIUM (PORCINE) LOCK FLUSH IV SOLN 100 UNIT/ML 100 UNIT/ML
100 SOLUTION INTRAVENOUS ONCE
Status: COMPLETED | OUTPATIENT
Start: 2025-05-09 | End: 2025-05-10

## 2025-05-09 RX ADMIN — KETOROLAC TROMETHAMINE 30 MG: 30 INJECTION, SOLUTION INTRAMUSCULAR at 19:06

## 2025-05-09 RX ADMIN — HYDROMORPHONE HYDROCHLORIDE 1 MG: 1 INJECTION, SOLUTION INTRAMUSCULAR; INTRAVENOUS; SUBCUTANEOUS at 20:03

## 2025-05-09 RX ADMIN — HYDROMORPHONE HYDROCHLORIDE 1 MG: 1 INJECTION, SOLUTION INTRAMUSCULAR; INTRAVENOUS; SUBCUTANEOUS at 19:06

## 2025-05-09 RX ADMIN — HYDROMORPHONE HYDROCHLORIDE 1 MG: 1 INJECTION, SOLUTION INTRAMUSCULAR; INTRAVENOUS; SUBCUTANEOUS at 21:33

## 2025-05-09 ASSESSMENT — ACTIVITIES OF DAILY LIVING (ADL)
ADLS_ACUITY_SCORE: 58

## 2025-05-09 NOTE — ED PROVIDER NOTES
[facility] EMERGENCY DEPARTMENT ([facility])    5/09/25       ED PROVIDER NOTE    History     Chief Complaint   Patient presents with    Sickle Cell Pain Crisis     Pt presents with groin pain- states this is not her usual presentation for sickle cell pain but thinks it is a pain crisis- pain is causing her to struggle to walk. States she took her home pain medication without relief.     HPI  Jennifer Cervantes is a 25 year old female with a past medical history of sickle cell anemia, hemiplegia due to cerebral infarct, and stroke, who presents to the ED for evaluation of sickle cell pain crisis. Patient reports pain in her left groin area. Patient states this started today and worsened through the day. Patient notes that the past 2 weeks she has had sickle cell pain. Patient reports she could walk earlier today but is currently struggling due to the pain. Patient reports moving her left leg is painful. Patient notes she has a history of sickle cell pain in her left leg but not this specific area. Patient notes history of being told her hip was deteriorating due to sickle cell. Patient reports she is taking her medication as prescribed. Patient denies history of similar pain. Patient denies skin changes, vaginal symptoms, vaginal burning, vaginal discharge, joint pain, fall, injury, low back pain, chest pain, bowel abnormalities, bladder abnormalities, chances of pregnancy, nausea, and vomiting.     Past Medical History  Past Medical History:   Diagnosis Date    Anxiety     Bleeding disorder     Blood clotting disorder     Cerebral infarction (H) 2015    Cognitive developmental delay     low IQ. 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 side (H)     right hand contractures    Iron overload due to repeated red blood cell transfusions     Migraines     Multiple subsegmental pulmonary emboli  without acute cor pulmonale (H) 02/01/2021    Oppositional defiant behavior     Presence of intrauterine contraceptive device 2/18/2020    Superficial venous thrombosis of arm, right 03/25/2021    Uncomplicated asthma      Past Surgical History:   Procedure Laterality Date    AS INSERT TUNNELED CV 2 CATH W/O PORT/PUMP      CHOLECYSTECTOMY      CV RIGHT HEART CATH MEASUREMENTS RECORDED N/A 11/18/2021    Procedure: Right Heart Cath;  Surgeon: Jackson Stauffer MD;  Location:  HEART CARDIAC CATH LAB    INSERT PORT VASCULAR ACCESS Left 4/21/2021    Procedure: INSERTION, VASCULAR ACCESS PORT (NOT SURE ON SIDE UNTIL REMOVAL);  Surgeon: Rajan More MD;  Location: [facility] OR    IR CHEST PORT PLACEMENT > 5 YRS OF AGE  4/21/2021    IR CVC NON TUNNEL LINE REMOVAL  5/6/2021    IR CVC NON TUNNEL PLACEMENT > 5 YRS  04/07/2020    IR CVC NON TUNNEL PLACEMENT > 5 YRS  4/30/2021    IR CVC NON TUNNEL PLACEMENT > 5 YRS  9/7/2022    IR PORT REMOVAL LEFT  4/21/2021    REMOVE PORT VASCULAR ACCESS Left 4/21/2021    Procedure: REMOVAL, VASCULAR ACCESS PORT LEFT;  Surgeon: Rajan More MD;  Location: [facility] OR    REPAIR TENDON ELBOW Right 10/02/2019    Procedure: Right Elbow Flexor Lengthening, Flexor Pronator Slide Of Wrist and Finger, Thumb Adductor Lengthening;  Surgeon: Anai rFanco MD;  Location: [facility] OR    TONSILLECTOMY Bilateral 10/02/2019    Procedure: Bilateral Tonsillectomy;  Surgeon: Farhana Guy MD;  Location: [facility] OR    ZZC BREAST REDUCTION (INCLUDES LIPO) TIER 3 Bilateral 04/23/2019     albuterol (PROVENTIL) (2.5 MG/3ML) 0.083% neb solution  aspirin (ASA) 81 MG chewable tablet  budesonide-formoterol (SYMBICORT/BREYNA) 160-4.5 MCG/ACT Inhaler  deferasirox (JADENU) 360 MG tablet  Deferiprone, Twice Daily, 1000 MG TABS  diclofenac (VOLTAREN) 1 % topical gel  diphenhydrAMINE (BENADRYL) 50 MG capsule  EPINEPHrine (ANY BX GENERIC EQUIV) 0.3 MG/0.3ML injection 2-pack  gabapentin (NEURONTIN) 300 MG  "capsule  hydroxyurea (HYDREA) 500 MG capsule  ibuprofen (ADVIL/MOTRIN) 800 MG tablet  methocarbamol (ROBAXIN) 750 MG tablet  methylPREDNISolone (MEDROL) 32 MG tablet  naloxone (NARCAN) 4 MG/0.1ML nasal spray  naproxen (NAPROSYN) 500 MG tablet  oxyCODONE (ROXICODONE) 5 MG tablet  oxyCODONE IR (ROXICODONE) 10 MG tablet  pantoprazole (PROTONIX) 40 MG EC tablet      Allergies   Allergen Reactions    Contrast Dye Angioedema     Hives and breathing issues    Fish-Derived Products Hives    Seafood Hives    Adhesive Tape Hives     Primipore dressing causes hives    Gadolinium     Iodinated Contrast Media      Family History  Family History   Problem Relation Age of Onset    Sickle Cell Trait Mother     Hypertension Mother     Asthma Mother     Sickle Cell Trait Father     Glaucoma No family hx of     Macular Degeneration No family hx of     Diabetes No family hx of     Gout No family hx of      Social History   Social History     Tobacco Use    Smoking status: Never     Passive exposure: Never    Smokeless tobacco: Never   Substance Use Topics    Alcohol use: Not Currently     Alcohol/week: 0.0 standard drinks of alcohol    Drug use: Never      A complete review of systems was performed with pertinent positives and negatives noted in the HPI, and all other systems negative.    Physical Exam   BP: 123/67  Pulse: 90  Temp: 98.2  F (36.8  C)  Resp: 16  Height: 165.1 cm (5' 5\")  Weight: 71.7 kg (158 lb)  SpO2: 92 %  Physical Exam  General: awake, alert, patient appears uncomfortable  Head: normal cephalic  HEENT: pupils equal, conjugate gaze intact  Neck: Supple  CV: regular rate and rhythm without murmur  Lungs: clear to auscultation  Abd: soft, non-tender, no guarding, no peritoneal signs  EXT: Tenderness along palpation of the joint line.  No tenderness palpation over the greater trochanter.  Patient reports significant pain with active flexion of the hip also has some pain with passive flexion of the hip but tolerates this " well.  Has no pain with AB and adduction of the hip either passive or active.  Lower extremities without swelling or edema  Neuro: awake, answers questions appropriately.  No rashes or lesions noted over the left hip.       ED Course, Procedures, & Data      Procedures         Results for orders placed or performed during the hospital encounter of 05/09/25   XR Hip Left 2-3 Views     Status: None    Narrative    EXAM: XR HIP LEFT 2-3 VIEWS  LOCATION: [facility]  DATE: 5/9/2025    INDICATION: hip pain  COMPARISON: None.      Impression    IMPRESSION: Normal joint spaces and alignment. No fracture.   US Lower Extremity Venous Duplex Bilateral     Status: None    Narrative    EXAM: US LOWER EXTREMITY VENOUS DUPLEX BILATERAL  LOCATION: [facility]  DATE: 5/9/2025    INDICATION: left leg pain, hx of clot  COMPARISON: None.  TECHNIQUE: Venous Duplex ultrasound of bilateral lower extremities with and without compression, augmentation and duplex. Color flow and spectral Doppler with waveform analysis performed.    FINDINGS: Exam includes the common femoral, femoral, popliteal veins as well as segmentally visualized deep calf veins and greater saphenous vein.     RIGHT: No deep vein thrombosis. No superficial thrombophlebitis. No popliteal cyst.    LEFT: No deep vein thrombosis. No superficial thrombophlebitis. No popliteal cyst.      Impression    IMPRESSION:  1.  No deep venous thrombosis in the bilateral lower extremities.   Comprehensive metabolic panel     Status: Abnormal   Result Value Ref Range    Sodium 137 135 - 145 mmol/L    Potassium 4.2 3.4 - 5.3 mmol/L    Carbon Dioxide (CO2) 23 22 - 29 mmol/L    Anion Gap 11 7 - 15 mmol/L    Urea Nitrogen 7.1 6.0 - 20.0 mg/dL    Creatinine 0.56 0.51 - 0.95 mg/dL    GFR Estimate >90 >60 mL/min/1.73m2    Calcium 8.9 8.8 - 10.4 mg/dL    Chloride 103 98 - 107 mmol/L    Glucose 91 70 - 99  mg/dL    Alkaline Phosphatase 59 40 - 150 U/L    AST 65 (H) 0 - 45 U/L    ALT 31 0 - 50 U/L    Protein Total 7.4 6.4 - 8.3 g/dL    Albumin 4.4 3.5 - 5.2 g/dL    Bilirubin Total 3.7 (H) <=1.2 mg/dL   Reticulocyte count     Status: Abnormal   Result Value Ref Range    % Reticulocyte 26.7 (H) 0.5 - 2.0 %    Absolute Reticulocyte 0.788 (H) 0.025 - 0.095 10e6/uL   CRP inflammation     Status: Normal   Result Value Ref Range    CRP Inflammation <3.00 <5.00 mg/L   CBC with platelets and differential     Status: Abnormal   Result Value Ref Range    WBC Count 11.0 4.0 - 11.0 10e3/uL    RBC Count 2.20 (L) 3.80 - 5.20 10e6/uL    Hemoglobin 6.6 (LL) 11.7 - 15.7 g/dL    Hematocrit 18.6 (L) 35.0 - 47.0 %    MCV 85 78 - 100 fL    MCH 30.0 26.5 - 33.0 pg    MCHC 35.5 31.5 - 36.5 g/dL    RDW 24.6 (H) 10.0 - 15.0 %    Platelet Count 442 150 - 450 10e3/uL    % Neutrophils 68 %    % Lymphocytes 19 %    % Monocytes 8 %    % Eosinophils 3 %    % Basophils 2 %    % Immature Granulocytes 1 %    NRBCs per 100 WBC 4 (H) <1 /100    Absolute Neutrophils 7.5 1.6 - 8.3 10e3/uL    Absolute Lymphocytes 2.1 0.8 - 5.3 10e3/uL    Absolute Monocytes 0.9 0.0 - 1.3 10e3/uL    Absolute Eosinophils 0.3 0.0 - 0.7 10e3/uL    Absolute Basophils 0.2 0.0 - 0.2 10e3/uL    Absolute Immature Granulocytes 0.1 <=0.4 10e3/uL    Absolute NRBCs 0.4 10e3/uL   Erythrocyte sedimentation rate auto     Status: Normal   Result Value Ref Range    Erythrocyte Sedimentation Rate 14 0 - 20 mm/hr   CBC with platelets differential     Status: Abnormal    Narrative    The following orders were created for panel order CBC with platelets differential.  Procedure                               Abnormality         Status                     ---------                               -----------         ------                     CBC with platelets and ...[[id number]]  Abnormal            Final result                 Please view results for these tests on the individual orders.      Medications   heparin lock flush 100 unit/mL injection 100 Units (has no administration in time range)   HYDROmorphone (PF) (DILAUDID) injection 1 mg (1 mg Intravenous $Given 5/9/25 2133)   ketorolac (TORADOL) injection 30 mg (30 mg Intravenous $Given 5/9/25 1906)     Labs Ordered and Resulted from Time of ED Arrival to Time of ED Departure   COMPREHENSIVE METABOLIC PANEL - Abnormal       Result Value    Sodium 137      Potassium 4.2      Carbon Dioxide (CO2) 23      Anion Gap 11      Urea Nitrogen 7.1      Creatinine 0.56      GFR Estimate >90      Calcium 8.9      Chloride 103      Glucose 91      Alkaline Phosphatase 59      AST 65 (*)     ALT 31      Protein Total 7.4      Albumin 4.4      Bilirubin Total 3.7 (*)    RETICULOCYTE COUNT - Abnormal    % Reticulocyte 26.7 (*)     Absolute Reticulocyte 0.788 (*)    CBC WITH PLATELETS AND DIFFERENTIAL - Abnormal    WBC Count 11.0      RBC Count 2.20 (*)     Hemoglobin 6.6 (*)     Hematocrit 18.6 (*)     MCV 85      MCH 30.0      MCHC 35.5      RDW 24.6 (*)     Platelet Count 442      % Neutrophils 68      % Lymphocytes 19      % Monocytes 8      % Eosinophils 3      % Basophils 2      % Immature Granulocytes 1      NRBCs per 100 WBC 4 (*)     Absolute Neutrophils 7.5      Absolute Lymphocytes 2.1      Absolute Monocytes 0.9      Absolute Eosinophils 0.3      Absolute Basophils 0.2      Absolute Immature Granulocytes 0.1      Absolute NRBCs 0.4     CRP INFLAMMATION - Normal    CRP Inflammation <3.00     ERYTHROCYTE SEDIMENTATION RATE AUTO - Normal    Erythrocyte Sedimentation Rate 14       US Lower Extremity Venous Duplex Bilateral   Final Result   IMPRESSION:   1.  No deep venous thrombosis in the bilateral lower extremities.      XR Hip Left 2-3 Views   Final Result   IMPRESSION: Normal joint spaces and alignment. No fracture.             Critical care was not performed.     Medical Decision Making  The patient's presentation was of high complexity (a chronic  illness severe exacerbation, progression, or side effect of treatment).    The patient's evaluation involved:  review of external note(s) from 3+ sources (see separate area of note for details)  ordering and/or review of 3+ test(s) in this encounter (see separate area of note for details)      The patient's management necessitated high risk (a parenteral controlled substance).    Assessment & Plan    25-year-old female who has a history of sickle pain who presents with sickle pain particularly in her left groin.  She has pain along the joint line.  Differential would include things such as infection, sickle cell pain, avascular necrosis or some type of other bony pathology.  On exam she does have decent range of motion of the hip so I think joint infection is less likely.  Will order separate CRP.  Will also order x-ray imaging.  Will also order pain medicine per her sickle cell pain plan.  She is denying chest pain shortness of breath or other systemic symptoms including fever or recent significant illness which would also decrease my concern for septic arthritis.    Does have previous history of DVT is no longer anticoagulated.  Could also consider DVT as a cause of her left leg pain    Patient's sed rate, CRP and white count are all normal.  She is denying fevers on reassessment she is bending the hip on her own.   X-ray does not show any bony abnormalities.  Hemoglobin is 6.6, this is low for her but she has been this low in the past her baseline is around 7.  She is not having syncope or near syncope or chest pain or shortness of breath.  She can alert her hematologist to this on Monday.  After 3 doses of Dilaudid she was feeling better.  Patient does have a history of DVT, is not anticoagulated.  I did obtain DVT ultrasound which showed no acute DVT patient will be discharged with hematology follow-up.  Discussed ED return precautions.  Patient understands and agrees with these return cautions        I have  reviewed the nursing notes. I have reviewed the findings, diagnosis, plan and need for follow up with the patient.    New Prescriptions    No medications on file       Final diagnoses:   Sickle cell pain crisis (H)   Hip pain, left     I, Gege Alston, am serving as a trained medical scribe to document services personally performed by Kilo Dai MD based on the provider's statements to me on May 9, 2025.  This document has been checked and approved by the attending provider.    I, Kilo Dai MD, was physically present and have reviewed and verified the accuracy of this note documented by Gege Alston, medical scribe.      Kilo Dai MD  [facility] EMERGENCY DEPARTMENT  5/9/2025     Kilo Dai MD  05/10/25 0110

## 2025-05-10 VITALS
BODY MASS INDEX: 26.33 KG/M2 | RESPIRATION RATE: 16 BRPM | DIASTOLIC BLOOD PRESSURE: 70 MMHG | OXYGEN SATURATION: 96 % | TEMPERATURE: 98.2 F | WEIGHT: 158 LBS | SYSTOLIC BLOOD PRESSURE: 124 MMHG | HEART RATE: 90 BPM | HEIGHT: 65 IN

## 2025-05-10 PROCEDURE — 250N000011 HC RX IP 250 OP 636: Performed by: EMERGENCY MEDICINE

## 2025-05-10 RX ADMIN — HEPARIN SODIUM (PORCINE) LOCK FLUSH IV SOLN 100 UNIT/ML 100 UNITS: 100 SOLUTION at 01:11

## 2025-05-10 ASSESSMENT — ACTIVITIES OF DAILY LIVING (ADL): ADLS_ACUITY_SCORE: 58

## 2025-05-10 NOTE — DISCHARGE INSTRUCTIONS
If you get fever, if symptoms worsen, if you are unable to move that joint secondary to pain please return to the ER for repeat evaluation.    Please follow-up with your hematologist regarding your hemoglobin

## 2025-05-11 ENCOUNTER — HOSPITAL ENCOUNTER (EMERGENCY)
Facility: CLINIC | Age: 25
Discharge: HOME | End: 2025-05-12
Attending: FAMILY MEDICINE
Payer: MEDICAID

## 2025-05-11 DIAGNOSIS — J45.40 MODERATE PERSISTENT ASTHMA WITHOUT COMPLICATION: ICD-10-CM

## 2025-05-11 DIAGNOSIS — D57.00 SICKLE CELL PAIN CRISIS (H): ICD-10-CM

## 2025-05-11 LAB
ALBUMIN SERPL BCG-MCNC: 4.7 G/DL (ref 3.5–5.2)
ALP SERPL-CCNC: 64 U/L (ref 40–150)
ALT SERPL W P-5'-P-CCNC: 37 U/L (ref 0–50)
ANION GAP SERPL CALCULATED.3IONS-SCNC: 12 MMOL/L (ref 7–15)
AST SERPL W P-5'-P-CCNC: 53 U/L (ref 0–45)
BASOPHILS # BLD AUTO: 0.3 10E3/UL (ref 0–0.2)
BASOPHILS NFR BLD AUTO: 2 %
BILIRUB SERPL-MCNC: 3.3 MG/DL
BUN SERPL-MCNC: 10.1 MG/DL (ref 6–20)
CALCIUM SERPL-MCNC: 9.1 MG/DL (ref 8.8–10.4)
CHLORIDE SERPL-SCNC: 104 MMOL/L (ref 98–107)
CREAT SERPL-MCNC: 0.67 MG/DL (ref 0.51–0.95)
EGFRCR SERPLBLD CKD-EPI 2021: >90 ML/MIN/1.73M2
EOSINOPHIL # BLD AUTO: 0.4 10E3/UL (ref 0–0.7)
EOSINOPHIL NFR BLD AUTO: 3 %
ERYTHROCYTE [DISTWIDTH] IN BLOOD BY AUTOMATED COUNT: 27.2 % (ref 10–15)
GLUCOSE SERPL-MCNC: 100 MG/DL (ref 70–99)
HCO3 SERPL-SCNC: 22 MMOL/L (ref 22–29)
HCT VFR BLD AUTO: 21.2 % (ref 35–47)
HGB BLD-MCNC: 7.6 G/DL (ref 11.7–15.7)
IMM GRANULOCYTES # BLD: 0.1 10E3/UL
IMM GRANULOCYTES NFR BLD: 0 %
LACTATE SERPL-SCNC: 1 MMOL/L (ref 0.7–2)
LYMPHOCYTES # BLD AUTO: 3.1 10E3/UL (ref 0.8–5.3)
LYMPHOCYTES NFR BLD AUTO: 21 %
MCH RBC QN AUTO: 31.4 PG (ref 26.5–33)
MCHC RBC AUTO-ENTMCNC: 35.8 G/DL (ref 31.5–36.5)
MCV RBC AUTO: 88 FL (ref 78–100)
MONOCYTES # BLD AUTO: 1 10E3/UL (ref 0–1.3)
MONOCYTES NFR BLD AUTO: 7 %
NEUTROPHILS # BLD AUTO: 9.6 10E3/UL (ref 1.6–8.3)
NEUTROPHILS NFR BLD AUTO: 67 %
NRBC # BLD AUTO: 0.7 10E3/UL
NRBC BLD AUTO-RTO: 5 /100
PLATELET # BLD AUTO: 473 10E3/UL (ref 150–450)
POTASSIUM SERPL-SCNC: 3.8 MMOL/L (ref 3.4–5.3)
PROT SERPL-MCNC: 8 G/DL (ref 6.4–8.3)
RBC # BLD AUTO: 2.42 10E6/UL (ref 3.8–5.2)
RETICS # AUTO: 0.7 10E6/UL (ref 0.03–0.1)
RETICS/RBC NFR AUTO: 29 % (ref 0.5–2)
SODIUM SERPL-SCNC: 138 MMOL/L (ref 135–145)
WBC # BLD AUTO: 14.4 10E3/UL (ref 4–11)

## 2025-05-11 PROCEDURE — 85025 COMPLETE CBC W/AUTO DIFF WBC: CPT | Performed by: FAMILY MEDICINE

## 2025-05-11 PROCEDURE — 83605 ASSAY OF LACTIC ACID: CPT | Performed by: FAMILY MEDICINE

## 2025-05-11 PROCEDURE — 96375 TX/PRO/DX INJ NEW DRUG ADDON: CPT | Performed by: FAMILY MEDICINE

## 2025-05-11 PROCEDURE — 80048 BASIC METABOLIC PNL TOTAL CA: CPT | Performed by: FAMILY MEDICINE

## 2025-05-11 PROCEDURE — 36415 COLL VENOUS BLD VENIPUNCTURE: CPT | Performed by: FAMILY MEDICINE

## 2025-05-11 PROCEDURE — 96374 THER/PROPH/DIAG INJ IV PUSH: CPT | Performed by: FAMILY MEDICINE

## 2025-05-11 PROCEDURE — 93010 ELECTROCARDIOGRAM REPORT: CPT | Performed by: FAMILY MEDICINE

## 2025-05-11 PROCEDURE — 99284 EMERGENCY DEPT VISIT MOD MDM: CPT | Performed by: FAMILY MEDICINE

## 2025-05-11 PROCEDURE — 99285 EMERGENCY DEPT VISIT HI MDM: CPT | Mod: 25 | Performed by: FAMILY MEDICINE

## 2025-05-11 PROCEDURE — 93005 ELECTROCARDIOGRAM TRACING: CPT | Performed by: FAMILY MEDICINE

## 2025-05-11 PROCEDURE — 250N000011 HC RX IP 250 OP 636: Performed by: FAMILY MEDICINE

## 2025-05-11 PROCEDURE — 85045 AUTOMATED RETICULOCYTE COUNT: CPT | Performed by: FAMILY MEDICINE

## 2025-05-11 PROCEDURE — 96361 HYDRATE IV INFUSION ADD-ON: CPT | Performed by: FAMILY MEDICINE

## 2025-05-11 PROCEDURE — 258N000003 HC RX IP 258 OP 636: Performed by: FAMILY MEDICINE

## 2025-05-11 RX ORDER — HYDROMORPHONE HYDROCHLORIDE 1 MG/ML
1 INJECTION, SOLUTION INTRAMUSCULAR; INTRAVENOUS; SUBCUTANEOUS ONCE
Refills: 0 | Status: COMPLETED | OUTPATIENT
Start: 2025-05-12 | End: 2025-05-12

## 2025-05-11 RX ORDER — ONDANSETRON 2 MG/ML
4 INJECTION INTRAMUSCULAR; INTRAVENOUS ONCE
Status: COMPLETED | OUTPATIENT
Start: 2025-05-11 | End: 2025-05-11

## 2025-05-11 RX ORDER — HYDROMORPHONE HYDROCHLORIDE 1 MG/ML
1 INJECTION, SOLUTION INTRAMUSCULAR; INTRAVENOUS; SUBCUTANEOUS ONCE
Refills: 0 | Status: COMPLETED | OUTPATIENT
Start: 2025-05-11 | End: 2025-05-11

## 2025-05-11 RX ORDER — KETOROLAC TROMETHAMINE 30 MG/ML
30 INJECTION, SOLUTION INTRAMUSCULAR; INTRAVENOUS ONCE
Status: COMPLETED | OUTPATIENT
Start: 2025-05-11 | End: 2025-05-11

## 2025-05-11 RX ADMIN — ONDANSETRON 4 MG: 2 INJECTION INTRAMUSCULAR; INTRAVENOUS at 22:52

## 2025-05-11 RX ADMIN — SODIUM CHLORIDE, SODIUM LACTATE, POTASSIUM CHLORIDE, AND CALCIUM CHLORIDE 1000 ML: .6; .31; .03; .02 INJECTION, SOLUTION INTRAVENOUS at 23:08

## 2025-05-11 RX ADMIN — HYDROMORPHONE HYDROCHLORIDE 1 MG: 1 INJECTION, SOLUTION INTRAMUSCULAR; INTRAVENOUS; SUBCUTANEOUS at 22:56

## 2025-05-11 RX ADMIN — KETOROLAC TROMETHAMINE 30 MG: 30 INJECTION, SOLUTION INTRAMUSCULAR at 22:54

## 2025-05-11 ASSESSMENT — ACTIVITIES OF DAILY LIVING (ADL): ADLS_ACUITY_SCORE: 58

## 2025-05-12 ENCOUNTER — APPOINTMENT (OUTPATIENT)
Dept: GENERAL RADIOLOGY | Facility: CLINIC | Age: 25
End: 2025-05-12
Attending: FAMILY MEDICINE
Payer: MEDICAID

## 2025-05-12 VITALS
RESPIRATION RATE: 16 BRPM | TEMPERATURE: 98.1 F | SYSTOLIC BLOOD PRESSURE: 114 MMHG | OXYGEN SATURATION: 98 % | HEART RATE: 92 BPM | DIASTOLIC BLOOD PRESSURE: 68 MMHG

## 2025-05-12 PROCEDURE — 94640 AIRWAY INHALATION TREATMENT: CPT | Performed by: FAMILY MEDICINE

## 2025-05-12 PROCEDURE — 71046 X-RAY EXAM CHEST 2 VIEWS: CPT

## 2025-05-12 PROCEDURE — 250N000009 HC RX 250: Performed by: FAMILY MEDICINE

## 2025-05-12 PROCEDURE — 96376 TX/PRO/DX INJ SAME DRUG ADON: CPT | Performed by: FAMILY MEDICINE

## 2025-05-12 PROCEDURE — 250N000011 HC RX IP 250 OP 636: Mod: JZ | Performed by: FAMILY MEDICINE

## 2025-05-12 RX ORDER — IPRATROPIUM BROMIDE AND ALBUTEROL SULFATE 2.5; .5 MG/3ML; MG/3ML
3 SOLUTION RESPIRATORY (INHALATION) ONCE
Status: COMPLETED | OUTPATIENT
Start: 2025-05-12 | End: 2025-05-12

## 2025-05-12 RX ORDER — HEPARIN SODIUM (PORCINE) LOCK FLUSH IV SOLN 100 UNIT/ML 100 UNIT/ML
100 SOLUTION INTRAVENOUS ONCE
Status: COMPLETED | OUTPATIENT
Start: 2025-05-12 | End: 2025-05-12

## 2025-05-12 RX ORDER — HYDROMORPHONE HYDROCHLORIDE 1 MG/ML
1 INJECTION, SOLUTION INTRAMUSCULAR; INTRAVENOUS; SUBCUTANEOUS ONCE
Refills: 0 | Status: COMPLETED | OUTPATIENT
Start: 2025-05-12 | End: 2025-05-12

## 2025-05-12 RX ADMIN — HYDROMORPHONE HYDROCHLORIDE 1 MG: 1 INJECTION, SOLUTION INTRAMUSCULAR; INTRAVENOUS; SUBCUTANEOUS at 00:03

## 2025-05-12 RX ADMIN — IPRATROPIUM BROMIDE AND ALBUTEROL SULFATE 3 ML: .5; 3 SOLUTION RESPIRATORY (INHALATION) at 01:16

## 2025-05-12 RX ADMIN — IPRATROPIUM BROMIDE AND ALBUTEROL SULFATE 3 ML: .5; 3 SOLUTION RESPIRATORY (INHALATION) at 00:56

## 2025-05-12 RX ADMIN — HEPARIN SODIUM (PORCINE) LOCK FLUSH IV SOLN 100 UNIT/ML 100 UNITS: 100 SOLUTION at 02:34

## 2025-05-12 RX ADMIN — HYDROMORPHONE HYDROCHLORIDE 1 MG: 1 INJECTION, SOLUTION INTRAMUSCULAR; INTRAVENOUS; SUBCUTANEOUS at 01:16

## 2025-05-12 ASSESSMENT — ACTIVITIES OF DAILY LIVING (ADL)
ADLS_ACUITY_SCORE: 58

## 2025-05-12 NOTE — ED TRIAGE NOTES
"Pt here for sickle cell crisis.    Pt's main priority is she is SOB.  Pt had a fever today.  Pt \"feels sick\"    Pt notes at her last encounter, she was told her hemoglobin is trending down.  Pt would like to get her hemoglobin checked.    Pt notes she is in more pain than usual.  Pain 10/10.        "

## 2025-05-12 NOTE — ED PROVIDER NOTES
[facility] [place] EMERGENCY DEPARTMENT ([facility])    5/11/25          History     Chief Complaint   Patient presents with    Sickle Cell Pain Crisis    Shortness of Breath     HPI  Jennifer Cervantes is a 25 year old female who with PMH of sickle cell anemia, hemiplegia due to cerebral infarct, and stroke, who presents to the ED for evaluation of sickle cell pain crisis and shortness of breath.     As per Epic review: Patient presented to [facility] ED on 5/9/2025 was discharged on 5/10/2025 after being admitted for sickle cell pain crisis.     X-ray did not show any bony abnormalities.  Hemoglobin is 6.6, this is low for her but she has been this low in the past her baseline is around 7.  She can alert her hematologist to this on Monday.  After 3 doses of Dilaudid she was feeling better.  Patient does have a history of DVT, is not anticoagulated.  I did obtain DVT ultrasound which showed no acute DVT patient will be discharged with hematology follow-up.  Discussed ED return precautions.       Past Medical History  Past Medical History:   Diagnosis Date    Anxiety     Bleeding disorder     Blood clotting disorder     Cerebral infarction (H) 2015    Cognitive developmental delay     low IQ. 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 side (H)     right hand contractures    Iron overload due to repeated red blood cell transfusions     Migraines     Multiple subsegmental pulmonary emboli without acute cor pulmonale (H) 02/01/2021    Oppositional defiant behavior     Presence of intrauterine contraceptive device 2/18/2020    Superficial venous thrombosis of arm, right 03/25/2021    Uncomplicated asthma      Past Surgical History:   Procedure Laterality Date    AS INSERT TUNNELED CV 2 CATH W/O PORT/PUMP      CHOLECYSTECTOMY      CV RIGHT HEART CATH MEASUREMENTS RECORDED N/A 11/18/2021     Procedure: Right Heart Cath;  Surgeon: Jackson Stauffer MD;  Location:  HEART CARDIAC CATH LAB    INSERT PORT VASCULAR ACCESS Left 4/21/2021    Procedure: INSERTION, VASCULAR ACCESS PORT (NOT SURE ON SIDE UNTIL REMOVAL);  Surgeon: Rajan More MD;  Location: [facility] OR    IR CHEST PORT PLACEMENT > 5 YRS OF AGE  4/21/2021    IR CVC NON TUNNEL LINE REMOVAL  5/6/2021    IR CVC NON TUNNEL PLACEMENT > 5 YRS  04/07/2020    IR CVC NON TUNNEL PLACEMENT > 5 YRS  4/30/2021    IR CVC NON TUNNEL PLACEMENT > 5 YRS  9/7/2022    IR PORT REMOVAL LEFT  4/21/2021    REMOVE PORT VASCULAR ACCESS Left 4/21/2021    Procedure: REMOVAL, VASCULAR ACCESS PORT LEFT;  Surgeon: Rajan oMre MD;  Location: [facility] OR    REPAIR TENDON ELBOW Right 10/02/2019    Procedure: Right Elbow Flexor Lengthening, Flexor Pronator Slide Of Wrist and Finger, Thumb Adductor Lengthening;  Surgeon: Anai Franco MD;  Location: [facility] OR    TONSILLECTOMY Bilateral 10/02/2019    Procedure: Bilateral Tonsillectomy;  Surgeon: Farhana Guy MD;  Location: [facility] OR    ZZC BREAST REDUCTION (INCLUDES LIPO) TIER 3 Bilateral 04/23/2019     albuterol (PROVENTIL) (2.5 MG/3ML) 0.083% neb solution  aspirin (ASA) 81 MG chewable tablet  budesonide-formoterol (SYMBICORT/BREYNA) 160-4.5 MCG/ACT Inhaler  deferasirox (JADENU) 360 MG tablet  Deferiprone, Twice Daily, 1000 MG TABS  diclofenac (VOLTAREN) 1 % topical gel  diphenhydrAMINE (BENADRYL) 50 MG capsule  EPINEPHrine (ANY BX GENERIC EQUIV) 0.3 MG/0.3ML injection 2-pack  gabapentin (NEURONTIN) 300 MG capsule  hydroxyurea (HYDREA) 500 MG capsule  ibuprofen (ADVIL/MOTRIN) 800 MG tablet  methocarbamol (ROBAXIN) 750 MG tablet  methylPREDNISolone (MEDROL) 32 MG tablet  naloxone (NARCAN) 4 MG/0.1ML nasal spray  naproxen (NAPROSYN) 500 MG tablet  oxyCODONE (ROXICODONE) 5 MG tablet  oxyCODONE IR (ROXICODONE) 10 MG tablet  pantoprazole (PROTONIX) 40 MG EC tablet      Allergies   Allergen Reactions    Contrast Dye  Angioedema     Hives and breathing issues    Fish-Derived Products Hives    Seafood Hives    Adhesive Tape Hives     Primipore dressing causes hives    Gadolinium     Iodinated Contrast Media      Family History  Family History   Problem Relation Age of Onset    Sickle Cell Trait Mother     Hypertension Mother     Asthma Mother     Sickle Cell Trait Father     Glaucoma No family hx of     Macular Degeneration No family hx of     Diabetes No family hx of     Gout No family hx of      Social History   Social History     Tobacco Use    Smoking status: Never     Passive exposure: Never    Smokeless tobacco: Never   Substance Use Topics    Alcohol use: Not Currently     Alcohol/week: 0.0 standard drinks of alcohol    Drug use: Never      Past medical history, past surgical history, medications, allergies, family history, and social history were reviewed with the patient. No additional pertinent items.   A complete review of systems was performed with pertinent positives and negatives noted in the HPI, and all other systems negative.    Physical Exam   BP: 123/78  Pulse: 92  Temp: 98.1  F (36.7  C)  Resp: 16  SpO2: 93 %  Physical Exam  Constitutional:       General: She is not in acute distress.     Appearance: Normal appearance. She is not diaphoretic.   HENT:      Head: Atraumatic.      Mouth/Throat:      Mouth: Mucous membranes are moist.   Eyes:      General: No scleral icterus.     Conjunctiva/sclera: Conjunctivae normal.   Cardiovascular:      Rate and Rhythm: Normal rate.      Heart sounds: Normal heart sounds.   Pulmonary:      Effort: No respiratory distress.      Breath sounds: Wheezing present.   Abdominal:      General: Abdomen is flat.   Musculoskeletal:      Cervical back: Neck supple.   Skin:     General: Skin is warm.      Findings: No rash.   Neurological:      Mental Status: She is alert and oriented to person, place, and time. Mental status is at baseline.           ED Course, Procedures, & Data            EKG Interpretation:      Interpreted by Jose Eduardo Rush MD.  Time reviewed: 22:32   Rhythm: Normal sinus   Rate: 87 bpm  Axis: Normal  Ectopy: None  Conduction: Normal  ST Segments/ T Waves: No ST-T wave changes and No acute ischemic changes  Q Waves: None  Comparison to prior: No old EKG available    Clinical Impression: normal EKG   Procedures     Results for orders placed or performed during the hospital encounter of 05/11/25   XR Chest 2 Views     Status: None    Narrative    EXAM: XR CHEST 2 VIEWS  LOCATION: [facility]  DATE: 5/12/2025    INDICATION: Shortness of breath.  COMPARISON: 04/03/2025      Impression    IMPRESSION: Negative chest. Left chest wall port catheter terminates at the superior cavoatrial junction. Cholecystectomy clip in the right upper quadrant.   Comprehensive metabolic panel     Status: Abnormal   Result Value Ref Range    Sodium 138 135 - 145 mmol/L    Potassium 3.8 3.4 - 5.3 mmol/L    Carbon Dioxide (CO2) 22 22 - 29 mmol/L    Anion Gap 12 7 - 15 mmol/L    Urea Nitrogen 10.1 6.0 - 20.0 mg/dL    Creatinine 0.67 0.51 - 0.95 mg/dL    GFR Estimate >90 >60 mL/min/1.73m2    Calcium 9.1 8.8 - 10.4 mg/dL    Chloride 104 98 - 107 mmol/L    Glucose 100 (H) 70 - 99 mg/dL    Alkaline Phosphatase 64 40 - 150 U/L    AST 53 (H) 0 - 45 U/L    ALT 37 0 - 50 U/L    Protein Total 8.0 6.4 - 8.3 g/dL    Albumin 4.7 3.5 - 5.2 g/dL    Bilirubin Total 3.3 (H) <=1.2 mg/dL   Lactic acid whole blood with 1x repeat in 2 hr when >2     Status: Normal   Result Value Ref Range    Lactic Acid, Initial 1.0 0.7 - 2.0 mmol/L   Reticulocyte count     Status: Abnormal   Result Value Ref Range    % Reticulocyte 29.0 (H) 0.5 - 2.0 %    Absolute Reticulocyte 0.696 (H) 0.025 - 0.095 10e6/uL   CBC with platelets and differential     Status: Abnormal   Result Value Ref Range    WBC Count 14.4 (H) 4.0 - 11.0 10e3/uL    RBC Count 2.42 (L) 3.80 - 5.20 10e6/uL    Hemoglobin  7.6 (L) 11.7 - 15.7 g/dL    Hematocrit 21.2 (L) 35.0 - 47.0 %    MCV 88 78 - 100 fL    MCH 31.4 26.5 - 33.0 pg    MCHC 35.8 31.5 - 36.5 g/dL    RDW 27.2 (H) 10.0 - 15.0 %    Platelet Count 473 (H) 150 - 450 10e3/uL    % Neutrophils 67 %    % Lymphocytes 21 %    % Monocytes 7 %    % Eosinophils 3 %    % Basophils 2 %    % Immature Granulocytes 0 %    NRBCs per 100 WBC 5 (H) <1 /100    Absolute Neutrophils 9.6 (H) 1.6 - 8.3 10e3/uL    Absolute Lymphocytes 3.1 0.8 - 5.3 10e3/uL    Absolute Monocytes 1.0 0.0 - 1.3 10e3/uL    Absolute Eosinophils 0.4 0.0 - 0.7 10e3/uL    Absolute Basophils 0.3 (H) 0.0 - 0.2 10e3/uL    Absolute Immature Granulocytes 0.1 <=0.4 10e3/uL    Absolute NRBCs 0.7 10e3/uL   EKG 12-lead, tracing only     Status: None   Result Value Ref Range    Systolic Blood Pressure  mmHg    Diastolic Blood Pressure  mmHg    Ventricular Rate 87 BPM    Atrial Rate 87 BPM    CA Interval 156 ms    QRS Duration 78 ms     ms    QTc 452 ms    P Axis 68 degrees    R AXIS 33 degrees    T Axis 9 degrees    Interpretation ECG       Sinus rhythm  Normal ECG    Unconfirmed report - interpretation of this ECG is computer generated - see medical record for final interpretation  Confirmed by - EMERGENCY ROOM, PHYSICIAN ([id number]),  ZEFERINO CARVER ([id number]) on 5/13/2025 7:14:08 AM     CBC with platelets differential     Status: Abnormal    Narrative    The following orders were created for panel order CBC with platelets differential.  Procedure                               Abnormality         Status                     ---------                               -----------         ------                     CBC with platelets and ...[[id number]]  Abnormal            Final result                 Please view results for these tests on the individual orders.     Medications   lactated ringers BOLUS 1,000 mL (0 mLs Intravenous Stopped 5/12/25 0806)   ondansetron (ZOFRAN) injection 4 mg (4 mg Intravenous $Given 5/11/25 1874)    HYDROmorphone (PF) (DILAUDID) injection 1 mg (1 mg Intravenous $Given 5/11/25 2256)   ketorolac (TORADOL) injection 30 mg (30 mg Intravenous $Given 5/11/25 2254)   HYDROmorphone (PF) (DILAUDID) injection 1 mg (1 mg Intravenous $Given 5/12/25 0003)   ipratropium - albuterol 0.5 mg/2.5 mg/3 mL (DUONEB) neb solution 3 mL (3 mLs Nebulization $Given 5/12/25 0056)   ipratropium - albuterol 0.5 mg/2.5 mg/3 mL (DUONEB) neb solution 3 mL (3 mLs Nebulization $Given 5/12/25 0116)   HYDROmorphone (PF) (DILAUDID) injection 1 mg (1 mg Intravenous Incomplete 5/12/25 0244)   heparin lock flush 100 unit/mL injection 100 Units (100 Units Intravenous $Given 5/12/25 0234)     Labs Ordered and Resulted from Time of ED Arrival to Time of ED Departure   COMPREHENSIVE METABOLIC PANEL - Abnormal       Result Value    Sodium 138      Potassium 3.8      Carbon Dioxide (CO2) 22      Anion Gap 12      Urea Nitrogen 10.1      Creatinine 0.67      GFR Estimate >90      Calcium 9.1      Chloride 104      Glucose 100 (*)     Alkaline Phosphatase 64      AST 53 (*)     ALT 37      Protein Total 8.0      Albumin 4.7      Bilirubin Total 3.3 (*)    RETICULOCYTE COUNT - Abnormal    % Reticulocyte 29.0 (*)     Absolute Reticulocyte 0.696 (*)    CBC WITH PLATELETS AND DIFFERENTIAL - Abnormal    WBC Count 14.4 (*)     RBC Count 2.42 (*)     Hemoglobin 7.6 (*)     Hematocrit 21.2 (*)     MCV 88      MCH 31.4      MCHC 35.8      RDW 27.2 (*)     Platelet Count 473 (*)     % Neutrophils 67      % Lymphocytes 21      % Monocytes 7      % Eosinophils 3      % Basophils 2      % Immature Granulocytes 0      NRBCs per 100 WBC 5 (*)     Absolute Neutrophils 9.6 (*)     Absolute Lymphocytes 3.1      Absolute Monocytes 1.0      Absolute Eosinophils 0.4      Absolute Basophils 0.3 (*)     Absolute Immature Granulocytes 0.1      Absolute NRBCs 0.7     LACTIC ACID WHOLE BLOOD WITH 1X REPEAT IN 2 HR WHEN >2 - Normal    Lactic Acid, Initial 1.0       XR Chest 2 Views    Final Result   IMPRESSION: Negative chest. Left chest wall port catheter terminates at the superior cavoatrial junction. Cholecystectomy clip in the right upper quadrant.             Critical care was not performed.       Medical Decision Making  The patient's presentation was of moderate complexity (a chronic illness mild to moderate exacerbation, progression, or side effect of treatment).    The patient's evaluation involved:  ordering and/or review of 3+ test(s) in this encounter (see separate area of note for details)    The patient's management necessitated high risk (a parenteral controlled substance) and high risk (a decision regarding hospitalization) patient with history of sickle crisis at this time responding well to nebulizer treatment with underlying asthma as well as pain medication management patient is much improved at baseline and will be discharged from emergency room will not require hospitalization for observation.    .   Assessment & Plan        I have reviewed the nursing notes. I have reviewed the findings, diagnosis, plan and need for follow up with the patient.      Final diagnoses:   Moderate persistent asthma without complication   Sickle cell pain crisis (H)       Jose Eduardo Rush MD.  [facility] EMERGENCY DEPARTMENT  5/11/2025     Jose Eduardo Rush MD  05/13/25 1455

## 2025-05-12 NOTE — DISCHARGE INSTRUCTIONS
Discharge to home continue current medication regiment and follow-up with outpatient primary MD as well as hematology

## 2025-05-13 ENCOUNTER — PATIENT OUTREACH (OUTPATIENT)
Dept: CARE COORDINATION | Facility: CLINIC | Age: 25
End: 2025-05-13
Payer: MEDICAID

## 2025-05-13 ENCOUNTER — INFUSION THERAPY VISIT (OUTPATIENT)
Dept: ONCOLOGY | Facility: CLINIC | Age: 25
End: 2025-05-13
Attending: PEDIATRICS
Payer: MEDICAID

## 2025-05-13 ENCOUNTER — NURSE TRIAGE (OUTPATIENT)
Dept: ONCOLOGY | Facility: CLINIC | Age: 25
End: 2025-05-13
Payer: MEDICAID

## 2025-05-13 VITALS
OXYGEN SATURATION: 96 % | RESPIRATION RATE: 18 BRPM | HEART RATE: 90 BPM | SYSTOLIC BLOOD PRESSURE: 139 MMHG | DIASTOLIC BLOOD PRESSURE: 83 MMHG | TEMPERATURE: 98.1 F

## 2025-05-13 DIAGNOSIS — D57.00 SICKLE CELL DISEASE WITH CRISIS (H): ICD-10-CM

## 2025-05-13 DIAGNOSIS — G81.10 SPASTIC HEMIPLEGIA, UNSPECIFIED ETIOLOGY, UNSPECIFIED LATERALITY (H): ICD-10-CM

## 2025-05-13 DIAGNOSIS — D57.00 SICKLE CELL PAIN CRISIS (H): Primary | ICD-10-CM

## 2025-05-13 LAB
ATRIAL RATE - MUSE: 87 BPM
C PNEUM DNA SPEC QL NAA+PROBE: NOT DETECTED
DIASTOLIC BLOOD PRESSURE - MUSE: NORMAL MMHG
FLUAV H1 2009 PAND RNA SPEC QL NAA+PROBE: NOT DETECTED
FLUAV H1 RNA SPEC QL NAA+PROBE: NOT DETECTED
FLUAV H3 RNA SPEC QL NAA+PROBE: NOT DETECTED
FLUAV RNA SPEC QL NAA+PROBE: NOT DETECTED
FLUBV RNA SPEC QL NAA+PROBE: NOT DETECTED
HADV DNA SPEC QL NAA+PROBE: NOT DETECTED
HCOV PNL SPEC NAA+PROBE: NOT DETECTED
HMPV RNA SPEC QL NAA+PROBE: NOT DETECTED
HPIV1 RNA SPEC QL NAA+PROBE: NOT DETECTED
HPIV2 RNA SPEC QL NAA+PROBE: NOT DETECTED
HPIV3 RNA SPEC QL NAA+PROBE: NOT DETECTED
HPIV4 RNA SPEC QL NAA+PROBE: NOT DETECTED
INTERPRETATION ECG - MUSE: NORMAL
M PNEUMO DNA SPEC QL NAA+PROBE: NOT DETECTED
P AXIS - MUSE: 68 DEGREES
PR INTERVAL - MUSE: 156 MS
QRS DURATION - MUSE: 78 MS
QT - MUSE: 376 MS
QTC - MUSE: 452 MS
R AXIS - MUSE: 33 DEGREES
RSV RNA SPEC QL NAA+PROBE: NOT DETECTED
RSV RNA SPEC QL NAA+PROBE: NOT DETECTED
RV+EV RNA SPEC QL NAA+PROBE: NOT DETECTED
SYSTOLIC BLOOD PRESSURE - MUSE: NORMAL MMHG
T AXIS - MUSE: 9 DEGREES
VENTRICULAR RATE- MUSE: 87 BPM

## 2025-05-13 PROCEDURE — 96361 HYDRATE IV INFUSION ADD-ON: CPT

## 2025-05-13 PROCEDURE — 96374 THER/PROPH/DIAG INJ IV PUSH: CPT

## 2025-05-13 PROCEDURE — 96376 TX/PRO/DX INJ SAME DRUG ADON: CPT

## 2025-05-13 PROCEDURE — 250N000013 HC RX MED GY IP 250 OP 250 PS 637: Performed by: PEDIATRICS

## 2025-05-13 PROCEDURE — 250N000011 HC RX IP 250 OP 636: Mod: JZ | Performed by: PEDIATRICS

## 2025-05-13 PROCEDURE — 258N000003 HC RX IP 258 OP 636: Performed by: PEDIATRICS

## 2025-05-13 PROCEDURE — 87633 RESP VIRUS 12-25 TARGETS: CPT | Performed by: REGISTERED NURSE

## 2025-05-13 PROCEDURE — 96375 TX/PRO/DX INJ NEW DRUG ADDON: CPT

## 2025-05-13 RX ORDER — KETOROLAC TROMETHAMINE 30 MG/ML
30 INJECTION, SOLUTION INTRAMUSCULAR; INTRAVENOUS ONCE
Status: CANCELLED
Start: 2025-08-01 | End: 2025-08-01

## 2025-05-13 RX ORDER — HEPARIN SODIUM (PORCINE) LOCK FLUSH IV SOLN 100 UNIT/ML 100 UNIT/ML
5 SOLUTION INTRAVENOUS
OUTPATIENT
Start: 2025-08-01

## 2025-05-13 RX ORDER — ONDANSETRON 2 MG/ML
8 INJECTION INTRAMUSCULAR; INTRAVENOUS EVERY 6 HOURS PRN
Status: CANCELLED
Start: 2025-08-01

## 2025-05-13 RX ORDER — HEPARIN SODIUM,PORCINE 10 UNIT/ML
5 VIAL (ML) INTRAVENOUS
OUTPATIENT
Start: 2025-08-01

## 2025-05-13 RX ORDER — HEPARIN SODIUM,PORCINE 10 UNIT/ML
5 VIAL (ML) INTRAVENOUS
Status: DISCONTINUED | OUTPATIENT
Start: 2025-05-13 | End: 2025-05-13 | Stop reason: HOSPADM

## 2025-05-13 RX ORDER — DIPHENHYDRAMINE HCL 25 MG
50 CAPSULE ORAL
Status: COMPLETED | OUTPATIENT
Start: 2025-05-13 | End: 2025-05-13

## 2025-05-13 RX ORDER — DIPHENHYDRAMINE HCL 25 MG
50 CAPSULE ORAL
Status: CANCELLED
Start: 2025-08-01

## 2025-05-13 RX ORDER — ONDANSETRON 2 MG/ML
8 INJECTION INTRAMUSCULAR; INTRAVENOUS EVERY 6 HOURS PRN
Status: DISCONTINUED | OUTPATIENT
Start: 2025-05-13 | End: 2025-05-13 | Stop reason: HOSPADM

## 2025-05-13 RX ORDER — ONDANSETRON 4 MG/1
8 TABLET, FILM COATED ORAL
Start: 2025-08-01

## 2025-05-13 RX ORDER — HEPARIN SODIUM (PORCINE) LOCK FLUSH IV SOLN 100 UNIT/ML 100 UNIT/ML
5 SOLUTION INTRAVENOUS
Status: DISCONTINUED | OUTPATIENT
Start: 2025-05-13 | End: 2025-05-13 | Stop reason: HOSPADM

## 2025-05-13 RX ORDER — KETOROLAC TROMETHAMINE 30 MG/ML
30 INJECTION, SOLUTION INTRAMUSCULAR; INTRAVENOUS ONCE
Status: COMPLETED | OUTPATIENT
Start: 2025-05-13 | End: 2025-05-13

## 2025-05-13 RX ADMIN — KETOROLAC TROMETHAMINE 30 MG: 30 INJECTION, SOLUTION INTRAMUSCULAR at 09:08

## 2025-05-13 RX ADMIN — SODIUM CHLORIDE, SODIUM LACTATE, POTASSIUM CHLORIDE, AND CALCIUM CHLORIDE 1000 ML: .6; .31; .03; .02 INJECTION, SOLUTION INTRAVENOUS at 08:58

## 2025-05-13 RX ADMIN — ONDANSETRON 8 MG: 2 INJECTION INTRAMUSCULAR; INTRAVENOUS at 09:03

## 2025-05-13 RX ADMIN — Medication 5 ML: at 11:15

## 2025-05-13 RX ADMIN — HYDROMORPHONE HYDROCHLORIDE 1 MG: 1 INJECTION, SOLUTION INTRAMUSCULAR; INTRAVENOUS; SUBCUTANEOUS at 11:05

## 2025-05-13 RX ADMIN — HYDROMORPHONE HYDROCHLORIDE 1 MG: 1 INJECTION, SOLUTION INTRAMUSCULAR; INTRAVENOUS; SUBCUTANEOUS at 09:01

## 2025-05-13 RX ADMIN — DIPHENHYDRAMINE HYDROCHLORIDE 50 MG: 25 CAPSULE ORAL at 09:02

## 2025-05-13 RX ADMIN — HYDROMORPHONE HYDROCHLORIDE 1 MG: 1 INJECTION, SOLUTION INTRAMUSCULAR; INTRAVENOUS; SUBCUTANEOUS at 10:03

## 2025-05-13 NOTE — TELEPHONE ENCOUNTER
0745 call to Jennifer to offer 0830 infusion appt in [facility]. Pt accepting of time. Pt will need help with a ride home.   1749 request sent to scheduling.   [id number] request sent to  to assist w/ transportation home.

## 2025-05-13 NOTE — TELEPHONE ENCOUNTER
"Oncology Nurse Triage - Sickle Cell Pain Crisis:    Situation: Jennifer  calling about Sickle Cell Pain Crisis, requesting to be added on for IV fluids and pain medicine    Background:     Patient's last infusion was 05/12/25  Last clinic visit date:4/25/25 w/ Patricia Mantilla   Does patient have active treatment plan? Yes    Assessment of Symptoms:  Onset/Duration of symptoms: 1 day    Is it typical sickle cell pain? Yes  Location: \"whole back and also on left shin\"  Character: Cramping  Intensity: 7/10    Any radiation of pain, numbness, tingling, weakness, warmth, swelling, discoloration of arms or legs?No    Fever? No    Chest Pain Present: No    Shortness of breath: No    Other home therapies tried: HEAT/HEATING PAD and WARM BATH     Last home medication taken and when: ibuprofen 10pm    Any Refills Needed?: No    Does patient have transportation & length of time to get to clinic: Yes, if appt is available before 11 am she can find transportation.      Recommendations:   0716 Secure message to Patricia Mantilla    0736 approved by Patricia Mantilla    If you do not hear from the infusion center by 2pm then you will not be able to get in for an infusion today. If symptoms worsen while waiting for call back, and/or you experience fever, chills, SOB, chest pain, cough, n/v, dizziness, numbness, swelling, discoloration of extremities, then seek emergency evaluation in Emergency Department.               "

## 2025-05-13 NOTE — PROGRESS NOTES
Ambulatory Care Management- Transportation Support  Specialty SW - [facility]     Data/Intervention:  Patient Name:    Jennifer Cervantes     /Age: 2000 (25 year old)     CCRC team member assisting Specialty SW with transportation request.      Assessment:   CHW/CTA booked online with [employer] to arrange a ride home from a medical appointment. Transportation was scheduled in conjunction with patient's insurance.   Your reference number is [id number]    Taxi company: [employer]    Patient will need to call above taxi company at phone number: [phone] when ready for return ride home.      Plan:  Patient is aware of the transportation plan.  available to assist with any other needs.      Lisandra Kilgore MA

## 2025-05-13 NOTE — PROGRESS NOTES
Infusion Nursing Note:  Jennifer Cervantes presents today for IVF/Pain medication.    Patient seen by provider today: No   present during visit today: Not Applicable.    Note: Patient reports generalized constant cramping pain with PS 8/10 accompanied with difficult of sleeping and eating. Still trying to rotate her medication. No change on shortness of breath. Denies fever/chills. No new complaints.    Patient did her own nasal swab for respiratory panel.     Upon discharge patient had 3 doses of Dilaudid with PS 5/10. Patient verbalized knowledge on where and when to call if symptoms persists/worsen.     Intravenous Access:  Implanted Port.    Treatment Conditions:  Not Applicable.    TORB: 5/13/25/Patricia Mantilla NP/Dione Delarosa RN/ Symptoms noted. We wouldn't usually change anything with her medications. It sounds like a pain crisis although rather atypical for her. We'll see if she gets improvement with meds today. She should keep alternating at home with non-opioid meds first. I reviewed her ED workup including LE US, labs, CXR which look good. WBC count is up slightly. Have her let us know if there is any change in her breathing. Please do viral panel if she like. Continue with rotating her medication.       Post Infusion Assessment:  Patient tolerated infusion without incident.  Blood return noted pre and post infusion.  Site patent and intact, free from redness, edema or discomfort.  No evidence of extravasations.  Access discontinued per protocol.       Discharge Plan:   Patient declined prescription refills.  Discharge instructions reviewed with: Patient.  Patient and/or family verbalized understanding of discharge instructions and all questions answered.  AVS to patient via [employer].  Patient will return 5/23 for next appointment.   Patient discharged in stable condition accompanied by: self.  Departure Mode: Ambulatory.      GLORIA YANCEY, JOE

## 2025-05-13 NOTE — TELEPHONE ENCOUNTER
Narcotic Refill Request    Medication(s) requested:  oxycodone   Person Requesting Refill:Jennifer (pt)   What pain is the medication treating: generalized sickle cell crisis pain, increase intensity fluctuating weather temperatures  How is the medication being taken?:pt state taking about 4 tablets per day  Does pt have enough for today? No, took last one today  Is pain being adequately controlled on the current regimen?: okay, requires intermittent IVF/Pain during the weekday  Experiencing any side effects from medication?: denies    Date of most recent appointment:  4/25/2025 Patricia Mantilla CNP  Any No Show Visits:none recently  Next appointment:   5/23/2025 Patricia Mantilla CNP  Last fill date and by whom:  Patricia Mantilla on 5/7/2025   Reviewed: yes    Send to provider: Patricia Mantilla CNP

## 2025-05-14 ENCOUNTER — THERAPY VISIT (OUTPATIENT)
Dept: PHYSICAL THERAPY | Facility: CLINIC | Age: 25
End: 2025-05-14
Payer: MEDICAID

## 2025-05-14 ENCOUNTER — VIRTUAL VISIT (OUTPATIENT)
Dept: PHARMACY | Facility: CLINIC | Age: 25
End: 2025-05-14
Payer: MEDICAID

## 2025-05-14 ENCOUNTER — INFUSION THERAPY VISIT (OUTPATIENT)
Dept: TRANSPLANT | Facility: CLINIC | Age: 25
End: 2025-05-14
Attending: PEDIATRICS
Payer: MEDICAID

## 2025-05-14 ENCOUNTER — NURSE TRIAGE (OUTPATIENT)
Dept: ONCOLOGY | Facility: CLINIC | Age: 25
End: 2025-05-14

## 2025-05-14 ENCOUNTER — VIRTUAL VISIT (OUTPATIENT)
Dept: GASTROENTEROLOGY | Facility: CLINIC | Age: 25
End: 2025-05-14
Attending: STUDENT IN AN ORGANIZED HEALTH CARE EDUCATION/TRAINING PROGRAM
Payer: MEDICAID

## 2025-05-14 VITALS
TEMPERATURE: 97.8 F | SYSTOLIC BLOOD PRESSURE: 121 MMHG | OXYGEN SATURATION: 92 % | HEART RATE: 87 BPM | DIASTOLIC BLOOD PRESSURE: 79 MMHG | RESPIRATION RATE: 18 BRPM

## 2025-05-14 DIAGNOSIS — R10.13 EPIGASTRIC PAIN: ICD-10-CM

## 2025-05-14 DIAGNOSIS — M25.562 ACUTE PAIN OF LEFT KNEE: Primary | ICD-10-CM

## 2025-05-14 DIAGNOSIS — D57.00 SICKLE CELL PAIN CRISIS (H): ICD-10-CM

## 2025-05-14 DIAGNOSIS — J45.40 MODERATE PERSISTENT ASTHMA, UNSPECIFIED WHETHER COMPLICATED: Primary | ICD-10-CM

## 2025-05-14 DIAGNOSIS — G81.10 SPASTIC HEMIPLEGIA, UNSPECIFIED ETIOLOGY, UNSPECIFIED LATERALITY (H): ICD-10-CM

## 2025-05-14 DIAGNOSIS — D57.00 SICKLE CELL PAIN CRISIS (H): Primary | ICD-10-CM

## 2025-05-14 PROCEDURE — 97110 THERAPEUTIC EXERCISES: CPT | Mod: GP

## 2025-05-14 PROCEDURE — 99607 MTMS BY PHARM ADDL 15 MIN: CPT | Mod: 93

## 2025-05-14 PROCEDURE — 258N000003 HC RX IP 258 OP 636: Performed by: PEDIATRICS

## 2025-05-14 PROCEDURE — 97032 APPL MODALITY 1+ESTIM EA 15: CPT | Mod: GP

## 2025-05-14 PROCEDURE — 96376 TX/PRO/DX INJ SAME DRUG ADON: CPT

## 2025-05-14 PROCEDURE — 97530 THERAPEUTIC ACTIVITIES: CPT | Mod: GP

## 2025-05-14 PROCEDURE — 96375 TX/PRO/DX INJ NEW DRUG ADDON: CPT

## 2025-05-14 PROCEDURE — 250N000011 HC RX IP 250 OP 636: Mod: JZ | Performed by: PEDIATRICS

## 2025-05-14 PROCEDURE — 99605 MTMS BY PHARM NP 15 MIN: CPT | Mod: 93

## 2025-05-14 PROCEDURE — 96361 HYDRATE IV INFUSION ADD-ON: CPT

## 2025-05-14 PROCEDURE — 99207 PR NO BILLABLE SERVICE THIS VISIT: CPT | Mod: 95 | Performed by: INTERNAL MEDICINE

## 2025-05-14 PROCEDURE — 1125F AMNT PAIN NOTED PAIN PRSNT: CPT | Mod: 95 | Performed by: INTERNAL MEDICINE

## 2025-05-14 PROCEDURE — 96374 THER/PROPH/DIAG INJ IV PUSH: CPT

## 2025-05-14 PROCEDURE — 250N000013 HC RX MED GY IP 250 OP 250 PS 637: Performed by: PEDIATRICS

## 2025-05-14 RX ORDER — KETOROLAC TROMETHAMINE 30 MG/ML
30 INJECTION, SOLUTION INTRAMUSCULAR; INTRAVENOUS ONCE
Start: 2025-08-01 | End: 2025-08-01

## 2025-05-14 RX ORDER — ONDANSETRON 2 MG/ML
8 INJECTION INTRAMUSCULAR; INTRAVENOUS EVERY 6 HOURS PRN
Start: 2025-08-01

## 2025-05-14 RX ORDER — KETOROLAC TROMETHAMINE 30 MG/ML
30 INJECTION, SOLUTION INTRAMUSCULAR; INTRAVENOUS ONCE
Status: COMPLETED | OUTPATIENT
Start: 2025-05-14 | End: 2025-05-14

## 2025-05-14 RX ORDER — ONDANSETRON 2 MG/ML
8 INJECTION INTRAMUSCULAR; INTRAVENOUS EVERY 6 HOURS PRN
Status: DISCONTINUED | OUTPATIENT
Start: 2025-05-14 | End: 2025-05-14 | Stop reason: HOSPADM

## 2025-05-14 RX ORDER — DIPHENHYDRAMINE HCL 25 MG
50 CAPSULE ORAL
Start: 2025-08-01

## 2025-05-14 RX ORDER — HEPARIN SODIUM (PORCINE) LOCK FLUSH IV SOLN 100 UNIT/ML 100 UNIT/ML
5 SOLUTION INTRAVENOUS
OUTPATIENT
Start: 2025-08-01

## 2025-05-14 RX ORDER — OXYCODONE HYDROCHLORIDE 10 MG/1
10 TABLET ORAL EVERY 6 HOURS PRN
Qty: 12 TABLET | Refills: 0 | Status: SHIPPED | OUTPATIENT
Start: 2025-05-14

## 2025-05-14 RX ORDER — DIPHENHYDRAMINE HCL 25 MG
50 CAPSULE ORAL
Status: COMPLETED | OUTPATIENT
Start: 2025-05-14 | End: 2025-05-14

## 2025-05-14 RX ORDER — HEPARIN SODIUM,PORCINE 10 UNIT/ML
5 VIAL (ML) INTRAVENOUS
OUTPATIENT
Start: 2025-08-01

## 2025-05-14 RX ORDER — ONDANSETRON 4 MG/1
8 TABLET, FILM COATED ORAL
Start: 2025-08-01

## 2025-05-14 RX ADMIN — KETOROLAC TROMETHAMINE 30 MG: 30 INJECTION, SOLUTION INTRAMUSCULAR; INTRAVENOUS at 11:01

## 2025-05-14 RX ADMIN — DIPHENHYDRAMINE HYDROCHLORIDE 50 MG: 25 CAPSULE ORAL at 11:02

## 2025-05-14 RX ADMIN — HYDROMORPHONE HYDROCHLORIDE 1 MG: 1 INJECTION, SOLUTION INTRAMUSCULAR; INTRAVENOUS; SUBCUTANEOUS at 12:03

## 2025-05-14 RX ADMIN — SODIUM CHLORIDE, SODIUM LACTATE, POTASSIUM CHLORIDE, AND CALCIUM CHLORIDE 1000 ML: .6; .31; .03; .02 INJECTION, SOLUTION INTRAVENOUS at 11:02

## 2025-05-14 RX ADMIN — HYDROMORPHONE HYDROCHLORIDE 1 MG: 1 INJECTION, SOLUTION INTRAMUSCULAR; INTRAVENOUS; SUBCUTANEOUS at 11:02

## 2025-05-14 RX ADMIN — ONDANSETRON 8 MG: 2 INJECTION INTRAMUSCULAR; INTRAVENOUS at 11:00

## 2025-05-14 RX ADMIN — HYDROMORPHONE HYDROCHLORIDE 1 MG: 1 INJECTION, SOLUTION INTRAMUSCULAR; INTRAVENOUS; SUBCUTANEOUS at 12:59

## 2025-05-14 ASSESSMENT — PAIN SCALES - GENERAL: PAINLEVEL_OUTOF10: SEVERE PAIN (8)

## 2025-05-14 NOTE — Clinical Note
Hi Dr. Case,  I met with Jennifer today - asthma is not well controlled. I'm wondering your thoughts on stepping up therapy by adding LAMA (could switch Symbicort to Trelegy), or adding montelukast?  She also asked about being prescribed naproxen to use as needed (sparingly), as she found this to be more effective than ibuprofen. If you're okay with this, she knows not to take naproxen on the same days she takes ibuprofen, and knows there are concerns for frequent NSAID use with sickle cell and asthma.  Lastly, an ED provider prescribed pantoprazole for epigastric pain, but Jennifer isn't taking it. Wondering if you'd prefer she take it for gastroprotection with NSAID use?  Thanks for your input! [place] (Hailie), PharmD, MPH Medication Therapy Management Pharmacist

## 2025-05-14 NOTE — PROGRESS NOTES
Attempted video visit today - patient unable to turn on her camera when asked - asked if she would like to re-schedule - patient replied that she was not interested in this and ended the call.

## 2025-05-14 NOTE — TELEPHONE ENCOUNTER
Oncology Nurse Triage - Sickle Cell Pain Crisis:  Situation: Jennifer  calling about Sickle Cell Pain Crisis, requesting to be added on for IV fluids and pain medicine; has apt in clinic today at 0940. Would like infusion afterwards if possible.     Background:   Patient's last infusion was 5/13/25  Last clinic visit date:4/25/25 with Patricia Mantilla CNP  Does patient have active treatment plan? Yes    Assessment of Symptoms:  Onset/Duration of symptoms: Not sure, but for a while    Is it typical sickle cell pain? Yes  Location: generalized  Character: Dull ache  Intensity: 6/10    Any radiation of pain, numbness, tingling, weakness, warmth, swelling, discoloration of arms or legs?No    Fever? No  Chest Pain Present: No  Shortness of breath: No    Other home therapies tried: HEAT/HEATING PAD and WARM BATH   Last home medication taken and when: Took ibuprofen at 2200    Any Refills Needed?: No    Does patient have transportation & length of time to get to clinic: Has apt at 0940 and would like to get in for IVF/pm after that if possible, so won't need transportation if she can get in. If she goes home after apt and infusion offered later, then she will need transportation.    Recommendations:   If you do not hear from the infusion center by 2pm then you will not be able to get in for an infusion today. If symptoms worsen while waiting for call back, and/or you experience fever, chills, SOB, chest pain, cough, n/v, dizziness, numbness, swelling, discoloration of extremities, then seek emergency evaluation in Emergency Department.     Pt voiced understanding.  Added to infusion wait list.    0817: BMT can offer apt at 1100 for Jennifer.  0828: Called Jennifer and she confirmed she can come to apt at 1100.  Updated infusion charge nurse.   Message sent to CCOD to schedule.    Message routed to Care Team.

## 2025-05-14 NOTE — PROGRESS NOTES
"Infusion Nursing Note:  Jennifer Cervantes presents today for scheduled infusion.    Patient seen by provider today: No   present during visit today: Not Applicable.    Note: Patient arrived for add on IVF/pain medication. Reports 8/10 pain \" all over\". Pain has been ongoing for weeks. Denies chest pain, SOB, N/V, fever. Last took home PO pain medication yesterday morning. ONC toxicity assessment completed. Home medication and allergies reviewed. Patient received 1L LR over 2 hours. Received benadryl 50 mg PO, Zofran 8 mg IVP, Toradol 30 mg IVP, dilaudid 1 mg IVP every hour x 3 doses.       Intravenous Access:  Implanted Port.    Treatment Conditions:  Not Applicable.      Post Infusion Assessment:  Patient tolerated infusion without incident.  Blood return noted pre and post infusion.  Site patent and intact, free from redness, edema or discomfort.  Access discontinued per protocol.       Discharge Plan:   Patient discharged in stable condition accompanied by: self.  Departure Mode: Ambulatory.      Jennifer Lai RN  "

## 2025-05-14 NOTE — PROGRESS NOTES
Medication Therapy Management (MTM) Encounter    ASSESSMENT:                            Medication Adherence/Access: No issues identified.    Asthma   Breathing is not controlled with high dose ICS-formoterol. Per HONEY guidelines, could add a LAMA to step up therapy. If a LAMA is added, I would recommend switching Symbicort to [id number] inhaler, Trelegy (1 puff once daily), for convenience. An alternative option would be to add montelukast, with close monitoring for changes in mental health. Will discuss with PCP.     Chronic Pain (Sickle Cell Pain Crisis)  Patient took naproxen for 5 days and found it to be more effective than ibuprofen. She would like a prescription for naproxen to use as first-line NSAID. Will discuss with PCP, but discussed that she should continue to use NSAIDs sparingly and should avoid using naproxen on the same days ibuprofen is taken.    Concerns with frequent NSAID use given patient's comorbidities include increased risk of vaso-occlusive crises, renal impairment, and bronchospasm.    Epigastric Pain  Patient is not taking pantoprazole, which was prescribed most recently on 4/26/25. Patient may benefit from taking daily for gastroprotection with NSAID use. Will discuss with PCP.    PLAN:                            I will ask Dr. Case about:  Switching your inhaler or starting a medication called montelukast.  Sending a prescription for naproxen to use as needed (sparingly).  Taking pantoprazole to protect your stomach with NSAID use.    Follow-up: with MTM via phone on:  Thursday Aug 14, 2025   Appt at 11:00 AM  Telephone      SUBJECTIVE/OBJECTIVE:                          Jennifer Cervantes is a 25 year old female seen for a follow-up visit.       Reason for visit: 3 month follow up.    Allergies/ADRs: Reviewed in chart  Past Medical History: Reviewed in chart  Tobacco: She reports that she has never smoked. She has never been exposed to tobacco smoke. She has never used smokeless  "tobacco.  Alcohol: none    Medication Adherence/Access: no issues reported.    Asthma   Symbicort 160-4.5 mcg 2 puffs twice daily and 1-2 puffs as needed  Albuterol 0.083% 2 vials every 6 hours as needed  Patient rinses their mouth after using steroid inhaler.   Patient reports no current medication side effects.      Triggers include: strong odors and fumes and cold air.  Patient reports the following symptoms: Has been using albuterol nebulizer more frequently, despite using Symbicort inhaler twice daily every day. She usually needs albuterol more in the fall and winter, but reports increased cough, wheezing, and shortness of breath over the past 2 months, it \"comes and goes\".     Chronic Pain (Sickle Cell Pain Crisis)  Oxycodone IR 10 mg every 6 hours as needed - takes 1-2 days per week  Narcan nasal spray as needed - has on hand, hasn't needed to use  Methocarbamol 750 mg 4 times daily as needed - hasn't been using latley  Ibuprofen 800 mg every 8 hours as needed - tries to limit, takes twice weekly with food  Naproxen 750 mg daily - took for 5 days, was more helpful than ibuprofen  Diclofenac 1% gel 4 grams 4 times daily as needed - is helpful  Doesn't like taking a lot of medications to manage pain. Pain is pretty well managed. If having breakthrough pain, will first take ibuprofen, followed by oxycodone if needed. She took naproxen for a few days and found it to be very helpful. She is wondering if she can take this as first-line NSAID and if so, would like a prescription for it. She knows she shouldn't take on the same days ibuprofen is taken.    Medication considerations: Gabapentin caused drowsiness during the day, even at lower doses, but wasn't helpful for pain. It made her fall asleep, but would wake up still in pain.    Epigastric Pain  Pantoprazole 40 mg daily - not taking  Recently prescribed by ED provider. Patient reports it didn't help, so she's not taking.    Today's Vitals: There were no vitals " taken for this visit.  ----------------    I spent 19 minutes with this patient today.    A summary of these recommendations was sent via [employer].    [place] (Hailie), MaureenD, MPH  Medication Therapy Management Pharmacist     Telemedicine Visit Details  The patient's medications can be safely assessed via a telemedicine encounter.  Type of service:  Telephone visit  Originating Location (pt. Location): Home    Distant Location (provider location):  On-site  Start Time: 10:40 AM  End Time: 10:59 AM     Medication Therapy Recommendations  Epigastric pain   1 Current Medication: pantoprazole (PROTONIX) 40 MG EC tablet   Current Medication Sig: Take 1 tablet (40 mg) by mouth daily for 30 doses.   Rationale: Patient prefers not to take - Adherence - Adherence   Recommendation: Provide Education   Status: Contact Provider - Awaiting Response   Identified Date: 5/14/2025         Moderate persistent asthma without complication   1 Current Medication: budesonide-formoterol (SYMBICORT/BREYNA) 160-4.5 MCG/ACT Inhaler   Current Medication Sig: Inhale 2 puffs twice daily plus 1-2 puffs as needed. May use up to 12 puffs per day.   Rationale: Synergistic therapy - Needs additional medication therapy - Indication   Recommendation: Start Medication - LAMA or montelukast   Status: Contact Provider - Awaiting Response   Identified Date: 5/14/2025         Sickle cell pain crisis (H)   1 Current Medication: ibuprofen (ADVIL/MOTRIN) 800 MG tablet   Current Medication Sig: Take 800 mg by mouth every 8 hours as needed for moderate pain   Rationale: More effective medication available - Ineffective medication - Effectiveness   Recommendation: Change Medication - naproxen 500 MG tablet   Status: Contact Provider - Awaiting Response   Identified Date: 5/14/2025

## 2025-05-14 NOTE — NURSING NOTE
Current patient location: [address] [facility] [address]    Is the patient currently in the state of [place]? YES    Visit mode: VIDEO    If the visit is dropped, the patient can be reconnected by:VIDEO VISIT: Text to cell phone:   Telephone Information:   Mobile [phone]       Will anyone else be joining the visit? NO  (If patient encounters technical issues they should call [phone]    Are changes needed to the allergy or medication list? No    Are refills needed on medications prescribed by this physician? NO    Rooming Documentation:  Questionnaire(s) completed    Reason for visit: Consult    Savanna DIAZ

## 2025-05-15 ENCOUNTER — APPOINTMENT (OUTPATIENT)
Dept: GENERAL RADIOLOGY | Facility: CLINIC | Age: 25
End: 2025-05-15
Attending: FAMILY MEDICINE
Payer: MEDICAID

## 2025-05-15 ENCOUNTER — HOSPITAL ENCOUNTER (EMERGENCY)
Facility: CLINIC | Age: 25
End: 2025-05-15
Attending: FAMILY MEDICINE
Payer: MEDICAID

## 2025-05-15 ENCOUNTER — VIRTUAL VISIT (OUTPATIENT)
Dept: PSYCHOLOGY | Facility: CLINIC | Age: 25
End: 2025-05-15
Payer: MEDICAID

## 2025-05-15 VITALS
HEIGHT: 64 IN | BODY MASS INDEX: 27.18 KG/M2 | OXYGEN SATURATION: 95 % | WEIGHT: 159.2 LBS | HEART RATE: 94 BPM | DIASTOLIC BLOOD PRESSURE: 81 MMHG | SYSTOLIC BLOOD PRESSURE: 126 MMHG | RESPIRATION RATE: 16 BRPM | TEMPERATURE: 98.3 F

## 2025-05-15 DIAGNOSIS — F34.1 PERSISTENT DEPRESSIVE DISORDER: ICD-10-CM

## 2025-05-15 DIAGNOSIS — F54 PSYCHOLOGICAL AND BEHAVIORAL FACTORS ASSOCIATED WITH DISORDERS OR DISEASES CLASSIFIED ELSEWHERE: ICD-10-CM

## 2025-05-15 DIAGNOSIS — G89.4 CHRONIC PAIN ASSOCIATED WITH SIGNIFICANT PSYCHOSOCIAL DYSFUNCTION: Primary | ICD-10-CM

## 2025-05-15 DIAGNOSIS — D57.00 SICKLE CELL DISEASE WITH CRISIS (H): ICD-10-CM

## 2025-05-15 LAB
ALBUMIN SERPL BCG-MCNC: 4.6 G/DL (ref 3.5–5.2)
ALP SERPL-CCNC: 63 U/L (ref 40–150)
ALT SERPL W P-5'-P-CCNC: 39 U/L (ref 0–50)
ANION GAP SERPL CALCULATED.3IONS-SCNC: 10 MMOL/L (ref 7–15)
AST SERPL W P-5'-P-CCNC: 59 U/L (ref 0–45)
ATRIAL RATE - MUSE: 88 BPM
BASOPHILS # BLD AUTO: 0.3 10E3/UL (ref 0–0.2)
BASOPHILS NFR BLD AUTO: 2 %
BILIRUB SERPL-MCNC: 3.7 MG/DL
BUN SERPL-MCNC: 8.5 MG/DL (ref 6–20)
CALCIUM SERPL-MCNC: 9.3 MG/DL (ref 8.8–10.4)
CHLORIDE SERPL-SCNC: 106 MMOL/L (ref 98–107)
CREAT SERPL-MCNC: 0.6 MG/DL (ref 0.51–0.95)
DIASTOLIC BLOOD PRESSURE - MUSE: NORMAL MMHG
EGFRCR SERPLBLD CKD-EPI 2021: >90 ML/MIN/1.73M2
EOSINOPHIL # BLD AUTO: 0.5 10E3/UL (ref 0–0.7)
EOSINOPHIL NFR BLD AUTO: 4 %
ERYTHROCYTE [DISTWIDTH] IN BLOOD BY AUTOMATED COUNT: 25.2 % (ref 10–15)
GLUCOSE SERPL-MCNC: 96 MG/DL (ref 70–99)
HCO3 SERPL-SCNC: 23 MMOL/L (ref 22–29)
HCT VFR BLD AUTO: 20.4 % (ref 35–47)
HGB BLD-MCNC: 7.4 G/DL (ref 11.7–15.7)
IMM GRANULOCYTES # BLD: 0.1 10E3/UL
IMM GRANULOCYTES NFR BLD: 0 %
INTERPRETATION ECG - MUSE: NORMAL
LYMPHOCYTES # BLD AUTO: 2.6 10E3/UL (ref 0.8–5.3)
LYMPHOCYTES NFR BLD AUTO: 21 %
MCH RBC QN AUTO: 31.5 PG (ref 26.5–33)
MCHC RBC AUTO-ENTMCNC: 36.3 G/DL (ref 31.5–36.5)
MCV RBC AUTO: 87 FL (ref 78–100)
MONOCYTES # BLD AUTO: 1 10E3/UL (ref 0–1.3)
MONOCYTES NFR BLD AUTO: 8 %
NEUTROPHILS # BLD AUTO: 8.2 10E3/UL (ref 1.6–8.3)
NEUTROPHILS NFR BLD AUTO: 65 %
NRBC # BLD AUTO: 0.5 10E3/UL
NRBC BLD AUTO-RTO: 4 /100
P AXIS - MUSE: 71 DEGREES
PLATELET # BLD AUTO: 440 10E3/UL (ref 150–450)
POTASSIUM SERPL-SCNC: 4.1 MMOL/L (ref 3.4–5.3)
PR INTERVAL - MUSE: 154 MS
PROT SERPL-MCNC: 8.2 G/DL (ref 6.4–8.3)
QRS DURATION - MUSE: 70 MS
QT - MUSE: 384 MS
QTC - MUSE: 464 MS
R AXIS - MUSE: 60 DEGREES
RBC # BLD AUTO: 2.35 10E6/UL (ref 3.8–5.2)
SODIUM SERPL-SCNC: 139 MMOL/L (ref 135–145)
SYSTOLIC BLOOD PRESSURE - MUSE: NORMAL MMHG
T AXIS - MUSE: 21 DEGREES
VENTRICULAR RATE- MUSE: 88 BPM
WBC # BLD AUTO: 12.5 10E3/UL (ref 4–11)

## 2025-05-15 PROCEDURE — 99285 EMERGENCY DEPT VISIT HI MDM: CPT | Mod: 25 | Performed by: FAMILY MEDICINE

## 2025-05-15 PROCEDURE — 258N000003 HC RX IP 258 OP 636: Performed by: FAMILY MEDICINE

## 2025-05-15 PROCEDURE — 85045 AUTOMATED RETICULOCYTE COUNT: CPT | Performed by: FAMILY MEDICINE

## 2025-05-15 PROCEDURE — 96374 THER/PROPH/DIAG INJ IV PUSH: CPT | Performed by: FAMILY MEDICINE

## 2025-05-15 PROCEDURE — 93005 ELECTROCARDIOGRAM TRACING: CPT | Performed by: FAMILY MEDICINE

## 2025-05-15 PROCEDURE — 96361 HYDRATE IV INFUSION ADD-ON: CPT | Performed by: FAMILY MEDICINE

## 2025-05-15 PROCEDURE — 85004 AUTOMATED DIFF WBC COUNT: CPT | Performed by: FAMILY MEDICINE

## 2025-05-15 PROCEDURE — 71046 X-RAY EXAM CHEST 2 VIEWS: CPT

## 2025-05-15 PROCEDURE — 96375 TX/PRO/DX INJ NEW DRUG ADDON: CPT | Performed by: FAMILY MEDICINE

## 2025-05-15 PROCEDURE — 93010 ELECTROCARDIOGRAM REPORT: CPT | Performed by: FAMILY MEDICINE

## 2025-05-15 PROCEDURE — 36415 COLL VENOUS BLD VENIPUNCTURE: CPT | Performed by: FAMILY MEDICINE

## 2025-05-15 PROCEDURE — 250N000011 HC RX IP 250 OP 636: Performed by: FAMILY MEDICINE

## 2025-05-15 PROCEDURE — 84155 ASSAY OF PROTEIN SERUM: CPT | Performed by: FAMILY MEDICINE

## 2025-05-15 PROCEDURE — 99284 EMERGENCY DEPT VISIT MOD MDM: CPT | Performed by: FAMILY MEDICINE

## 2025-05-15 PROCEDURE — 90837 PSYTX W PT 60 MINUTES: CPT | Mod: 95 | Performed by: PSYCHOLOGIST

## 2025-05-15 RX ORDER — HYDROMORPHONE HYDROCHLORIDE 1 MG/ML
1 INJECTION, SOLUTION INTRAMUSCULAR; INTRAVENOUS; SUBCUTANEOUS ONCE
Refills: 0 | Status: COMPLETED | OUTPATIENT
Start: 2025-05-15 | End: 2025-05-15

## 2025-05-15 RX ORDER — KETOROLAC TROMETHAMINE 30 MG/ML
30 INJECTION, SOLUTION INTRAMUSCULAR; INTRAVENOUS ONCE
Status: COMPLETED | OUTPATIENT
Start: 2025-05-15 | End: 2025-05-15

## 2025-05-15 RX ORDER — ONDANSETRON 2 MG/ML
4 INJECTION INTRAMUSCULAR; INTRAVENOUS ONCE
Status: COMPLETED | OUTPATIENT
Start: 2025-05-15 | End: 2025-05-15

## 2025-05-15 RX ADMIN — ONDANSETRON 4 MG: 2 INJECTION INTRAMUSCULAR; INTRAVENOUS at 23:28

## 2025-05-15 RX ADMIN — HYDROMORPHONE HYDROCHLORIDE 1 MG: 1 INJECTION, SOLUTION INTRAMUSCULAR; INTRAVENOUS; SUBCUTANEOUS at 23:27

## 2025-05-15 RX ADMIN — KETOROLAC TROMETHAMINE 30 MG: 30 INJECTION, SOLUTION INTRAMUSCULAR at 23:28

## 2025-05-15 RX ADMIN — SODIUM CHLORIDE, SODIUM LACTATE, POTASSIUM CHLORIDE, AND CALCIUM CHLORIDE 1000 ML: .6; .31; .03; .02 INJECTION, SOLUTION INTRAVENOUS at 23:27

## 2025-05-15 ASSESSMENT — ACTIVITIES OF DAILY LIVING (ADL): ADLS_ACUITY_SCORE: 58

## 2025-05-15 NOTE — PROGRESS NOTES
[facility]                                                                                   OUTPATIENT PHYSICAL THERAPY    PLAN OF TREATMENT FOR OUTPATIENT REHABILITATION   Patient's Last Name, First Name, Jennifer Lobo YOB: 2000   Provider's Name   [facility]   Medical Record No.  [id number]     Onset Date: 02/12/25  Start of Care Date: 02/12/25     Medical Diagnosis:  Acute pain of left knee  Meniscal injury, left, initial encounter      PT Treatment Diagnosis:  L knee pain with strength and stability deficits Plan of Treatment  Frequency/Duration: 1x a week/ 90 days    Certification date from (P) 05/14/25 to (P) 08/10/25         See note for plan of treatment details and functional goals     Zulma Garcia, PT                         I CERTIFY THE NEED FOR THESE SERVICES FURNISHED UNDER        THIS PLAN OF TREATMENT AND WHILE UNDER MY CARE     (Physician attestation of this document indicates review and certification of the therapy plan).              Referring Provider:  Suraj Case    Initial Assessment  See Epic Evaluation- Start of Care Date: 02/12/25

## 2025-05-16 VITALS
HEIGHT: 64 IN | OXYGEN SATURATION: 97 % | HEART RATE: 94 BPM | WEIGHT: 159.2 LBS | BODY MASS INDEX: 27.18 KG/M2 | TEMPERATURE: 98.3 F | DIASTOLIC BLOOD PRESSURE: 70 MMHG | SYSTOLIC BLOOD PRESSURE: 109 MMHG | RESPIRATION RATE: 16 BRPM

## 2025-05-16 LAB
ATRIAL RATE - MUSE: 88 BPM
DIASTOLIC BLOOD PRESSURE - MUSE: NORMAL MMHG
INTERPRETATION ECG - MUSE: NORMAL
P AXIS - MUSE: 71 DEGREES
PR INTERVAL - MUSE: 154 MS
QRS DURATION - MUSE: 70 MS
QT - MUSE: 384 MS
QTC - MUSE: 464 MS
R AXIS - MUSE: 60 DEGREES
RETICS # AUTO: 0.55 10E6/UL (ref 0.03–0.1)
RETICS/RBC NFR AUTO: 23.3 % (ref 0.5–2)
SYSTOLIC BLOOD PRESSURE - MUSE: NORMAL MMHG
T AXIS - MUSE: 21 DEGREES
VENTRICULAR RATE- MUSE: 88 BPM

## 2025-05-16 PROCEDURE — 96374 THER/PROPH/DIAG INJ IV PUSH: CPT | Performed by: FAMILY MEDICINE

## 2025-05-16 PROCEDURE — 96376 TX/PRO/DX INJ SAME DRUG ADON: CPT | Performed by: FAMILY MEDICINE

## 2025-05-16 PROCEDURE — 250N000011 HC RX IP 250 OP 636: Mod: JZ | Performed by: FAMILY MEDICINE

## 2025-05-16 RX ORDER — HEPARIN SODIUM (PORCINE) LOCK FLUSH IV SOLN 100 UNIT/ML 100 UNIT/ML
100 SOLUTION INTRAVENOUS ONCE
Status: COMPLETED | OUTPATIENT
Start: 2025-05-16 | End: 2025-05-16

## 2025-05-16 RX ORDER — HYDROMORPHONE HYDROCHLORIDE 1 MG/ML
1 INJECTION, SOLUTION INTRAMUSCULAR; INTRAVENOUS; SUBCUTANEOUS ONCE
Refills: 0 | Status: COMPLETED | OUTPATIENT
Start: 2025-05-16 | End: 2025-05-16

## 2025-05-16 RX ADMIN — HYDROMORPHONE HYDROCHLORIDE 1 MG: 1 INJECTION, SOLUTION INTRAMUSCULAR; INTRAVENOUS; SUBCUTANEOUS at 01:33

## 2025-05-16 RX ADMIN — HEPARIN 100 UNITS: 100 SYRINGE at 02:11

## 2025-05-16 RX ADMIN — HYDROMORPHONE HYDROCHLORIDE 1 MG: 1 INJECTION, SOLUTION INTRAMUSCULAR; INTRAVENOUS; SUBCUTANEOUS at 00:29

## 2025-05-16 ASSESSMENT — ACTIVITIES OF DAILY LIVING (ADL)
ADLS_ACUITY_SCORE: 58

## 2025-05-16 NOTE — ED PROVIDER NOTES
"ED Provider Note  [facility]      History     Chief Complaint   Patient presents with    Sickle Cell Pain Crisis    Generalized Body Aches     HPI  Jennifer Cervantes is a 25 year old female with a  history of sickle cell anemia, hemiplegia due to cerebral infarct, and stroke who presents to the emergency department for sickle cell pain. ***    {History Review Selection (Optional):862874}  {ROS Selection (Optional):806574}    Physical Exam      Physical Exam  ***    ED Course, Procedures, & Data      Procedures       {ED Course Selections (Optional):007815}  {ED Sepsis CMS Documentation (Optional):020911::\" \"}       No results found for any visits on 05/15/25.  Medications - No data to display  Labs Ordered and Resulted from Time of ED Arrival to Time of ED Departure - No data to display  No orders to display          {Critical Care Performed?:684001}    Assessment & Plan    ***    I have reviewed the nursing notes. I have reviewed the findings, diagnosis, plan and need for follow up with the patient.    New Prescriptions    No medications on file       Final diagnoses:   None       ***  [facility] EMERGENCY DEPARTMENT  5/15/2025  " 9/7/2022    IR PORT REMOVAL LEFT  4/21/2021    REMOVE PORT VASCULAR ACCESS Left 4/21/2021    Procedure: REMOVAL, VASCULAR ACCESS PORT LEFT;  Surgeon: Rajan More MD;  Location: [facility] OR    REPAIR TENDON ELBOW Right 10/02/2019    Procedure: Right Elbow Flexor Lengthening, Flexor Pronator Slide Of Wrist and Finger, Thumb Adductor Lengthening;  Surgeon: nAai Franco MD;  Location: [facility] OR    TONSILLECTOMY Bilateral 10/02/2019    Procedure: Bilateral Tonsillectomy;  Surgeon: Farhana Guy MD;  Location: [facility] OR    ZZC BREAST REDUCTION (INCLUDES LIPO) TIER 3 Bilateral 04/23/2019     albuterol (PROVENTIL) (2.5 MG/3ML) 0.083% neb solution  aspirin (ASA) 81 MG chewable tablet  budesonide-formoterol (SYMBICORT/BREYNA) 160-4.5 MCG/ACT Inhaler  deferasirox (JADENU) 360 MG tablet  Deferiprone, Twice Daily, 1000 MG TABS  diclofenac (VOLTAREN) 1 % topical gel  diphenhydrAMINE (BENADRYL) 50 MG capsule  EPINEPHrine (ANY BX GENERIC EQUIV) 0.3 MG/0.3ML injection 2-pack  hydroxyurea (HYDREA) 500 MG capsule  ibuprofen (ADVIL/MOTRIN) 800 MG tablet  methocarbamol (ROBAXIN) 750 MG tablet  methylPREDNISolone (MEDROL) 32 MG tablet  naloxone (NARCAN) 4 MG/0.1ML nasal spray  naproxen (NAPROSYN) 500 MG tablet  oxyCODONE IR (ROXICODONE) 10 MG tablet  pantoprazole (PROTONIX) 40 MG EC tablet      Allergies   Allergen Reactions    Contrast Dye Angioedema     Hives and breathing issues    Fish-Derived Products Hives    Seafood Hives    Adhesive Tape Hives     Primipore dressing causes hives    Gadolinium     Iodinated Contrast Media      Family History  Family History   Problem Relation Age of Onset    Sickle Cell Trait Mother     Hypertension Mother     Asthma Mother     Sickle Cell Trait Father     Glaucoma No family hx of     Macular Degeneration No family hx of     Diabetes No family hx of     Gout No family hx of      Social History   Social History     Tobacco Use    Smoking status: Never     Passive exposure: Never     "Smokeless tobacco: Never   Substance Use Topics    Alcohol use: Not Currently     Alcohol/week: 0.0 standard drinks of alcohol    Drug use: Never      A medically appropriate review of systems was performed with pertinent positives and negatives noted in the HPI, and all other systems negative.    Physical Exam   BP: 126/81  Pulse: 94  Temp: 98.3  F (36.8  C)  Resp: 16  Height: 162.6 cm (5' 4\")  Weight: 72.2 kg (159 lb 3.2 oz)  SpO2: 95 %  Physical Exam  Constitutional:       General: She is not in acute distress.     Appearance: Normal appearance. She is not diaphoretic.   HENT:      Head: Atraumatic.      Mouth/Throat:      Mouth: Mucous membranes are moist.   Eyes:      General: No scleral icterus.     Conjunctiva/sclera: Conjunctivae normal.   Cardiovascular:      Rate and Rhythm: Normal rate.      Heart sounds: Normal heart sounds.   Pulmonary:      Effort: No respiratory distress.      Breath sounds: Normal breath sounds.   Abdominal:      General: Abdomen is flat.   Musculoskeletal:      Cervical back: Neck supple.   Skin:     General: Skin is warm.      Findings: No rash.   Neurological:      General: No focal deficit present.      Mental Status: She is alert and oriented to person, place, and time.      Sensory: No sensory deficit.      Motor: No weakness.           ED Course, Procedures, & Data      Procedures       Results for orders placed or performed during the hospital encounter of 05/15/25   XR Chest 2 Views     Status: None    Narrative    EXAM: XR CHEST 2 VIEWS  LOCATION: [facility]  DATE: 5/15/2025    INDICATION: Sickle cell disease.  COMPARISON: 5/12/2025.      Impression    IMPRESSION: No focal airspace consolidation. No pleural effusion or pneumothorax.    Heart size is normal.    Left chest wall port catheter has tip overlying the distal SVC.    Right upper quadrant cholecystectomy clips.   Comprehensive metabolic panel     Status: Abnormal "   Result Value Ref Range    Sodium 139 135 - 145 mmol/L    Potassium 4.1 3.4 - 5.3 mmol/L    Carbon Dioxide (CO2) 23 22 - 29 mmol/L    Anion Gap 10 7 - 15 mmol/L    Urea Nitrogen 8.5 6.0 - 20.0 mg/dL    Creatinine 0.60 0.51 - 0.95 mg/dL    GFR Estimate >90 >60 mL/min/1.73m2    Calcium 9.3 8.8 - 10.4 mg/dL    Chloride 106 98 - 107 mmol/L    Glucose 96 70 - 99 mg/dL    Alkaline Phosphatase 63 40 - 150 U/L    AST 59 (H) 0 - 45 U/L    ALT 39 0 - 50 U/L    Protein Total 8.2 6.4 - 8.3 g/dL    Albumin 4.6 3.5 - 5.2 g/dL    Bilirubin Total 3.7 (H) <=1.2 mg/dL   Reticulocyte count     Status: Abnormal   Result Value Ref Range    % Reticulocyte 23.3 (H) 0.5 - 2.0 %    Absolute Reticulocyte 0.548 (H) 0.025 - 0.095 10e6/uL   CBC with platelets and differential     Status: Abnormal   Result Value Ref Range    WBC Count 12.5 (H) 4.0 - 11.0 10e3/uL    RBC Count 2.35 (L) 3.80 - 5.20 10e6/uL    Hemoglobin 7.4 (L) 11.7 - 15.7 g/dL    Hematocrit 20.4 (L) 35.0 - 47.0 %    MCV 87 78 - 100 fL    MCH 31.5 26.5 - 33.0 pg    MCHC 36.3 31.5 - 36.5 g/dL    RDW 25.2 (H) 10.0 - 15.0 %    Platelet Count 440 150 - 450 10e3/uL    % Neutrophils 65 %    % Lymphocytes 21 %    % Monocytes 8 %    % Eosinophils 4 %    % Basophils 2 %    % Immature Granulocytes 0 %    NRBCs per 100 WBC 4 (H) <1 /100    Absolute Neutrophils 8.2 1.6 - 8.3 10e3/uL    Absolute Lymphocytes 2.6 0.8 - 5.3 10e3/uL    Absolute Monocytes 1.0 0.0 - 1.3 10e3/uL    Absolute Eosinophils 0.5 0.0 - 0.7 10e3/uL    Absolute Basophils 0.3 (H) 0.0 - 0.2 10e3/uL    Absolute Immature Granulocytes 0.1 <=0.4 10e3/uL    Absolute NRBCs 0.5 10e3/uL   EKG 12-lead, tracing only     Status: None (Preliminary result)   Result Value Ref Range    Systolic Blood Pressure  mmHg    Diastolic Blood Pressure  mmHg    Ventricular Rate 88 BPM    Atrial Rate 88 BPM    WI Interval 154 ms    QRS Duration 70 ms     ms    QTc 464 ms    P Axis 71 degrees    R AXIS 60 degrees    T Axis 21 degrees     Interpretation ECG       Sinus rhythm  Septal infarct , age undetermined  Abnormal ECG     CBC with platelets differential     Status: Abnormal    Narrative    The following orders were created for panel order CBC with platelets differential.  Procedure                               Abnormality         Status                     ---------                               -----------         ------                     CBC with platelets and ...[[id number]]  Abnormal            Final result                 Please view results for these tests on the individual orders.     Medications   lactated ringers BOLUS 1,000 mL (0 mLs Intravenous Stopped 5/16/25 0141)   HYDROmorphone (PF) (DILAUDID) injection 1 mg (1 mg Intravenous $Given 5/15/25 2327)   ondansetron (ZOFRAN) injection 4 mg (4 mg Intravenous $Given 5/15/25 2328)   ketorolac (TORADOL) injection 30 mg (30 mg Intravenous $Given 5/15/25 2328)   HYDROmorphone (PF) (DILAUDID) injection 1 mg (1 mg Intravenous $Given 5/16/25 0029)   HYDROmorphone (PF) (DILAUDID) injection 1 mg (1 mg Intravenous $Given 5/16/25 0133)   heparin lock flush 100 unit/mL injection 100 Units (100 Units Intravenous $Given 5/16/25 0211)     Labs Ordered and Resulted from Time of ED Arrival to Time of ED Departure   COMPREHENSIVE METABOLIC PANEL - Abnormal       Result Value    Sodium 139      Potassium 4.1      Carbon Dioxide (CO2) 23      Anion Gap 10      Urea Nitrogen 8.5      Creatinine 0.60      GFR Estimate >90      Calcium 9.3      Chloride 106      Glucose 96      Alkaline Phosphatase 63      AST 59 (*)     ALT 39      Protein Total 8.2      Albumin 4.6      Bilirubin Total 3.7 (*)    RETICULOCYTE COUNT - Abnormal    % Reticulocyte 23.3 (*)     Absolute Reticulocyte 0.548 (*)    CBC WITH PLATELETS AND DIFFERENTIAL - Abnormal    WBC Count 12.5 (*)     RBC Count 2.35 (*)     Hemoglobin 7.4 (*)     Hematocrit 20.4 (*)     MCV 87      MCH 31.5      MCHC 36.3      RDW 25.2 (*)     Platelet Count  440      % Neutrophils 65      % Lymphocytes 21      % Monocytes 8      % Eosinophils 4      % Basophils 2      % Immature Granulocytes 0      NRBCs per 100 WBC 4 (*)     Absolute Neutrophils 8.2      Absolute Lymphocytes 2.6      Absolute Monocytes 1.0      Absolute Eosinophils 0.5      Absolute Basophils 0.3 (*)     Absolute Immature Granulocytes 0.1      Absolute NRBCs 0.5     ROUTINE UA WITH MICROSCOPIC REFLEX TO CULTURE     XR Chest 2 Views   Final Result   IMPRESSION: No focal airspace consolidation. No pleural effusion or pneumothorax.      Heart size is normal.      Left chest wall port catheter has tip overlying the distal SVC.      Right upper quadrant cholecystectomy clips.             Critical care was not performed.         Medical Decision Making  The patient's presentation was of moderate complexity (a chronic illness mild to moderate exacerbation, progression, or side effect of treatment).    The patient's evaluation involved:  ordering and/or review of 3+ test(s) in this encounter (see separate area of note for details)    The patient's management necessitated moderate risk (prescription drug management including medications given in the ED) and high risk (a parenteral controlled substance).   Assessment & Plan        I have reviewed the nursing notes. I have reviewed the findings, diagnosis, plan and need for follow up with the patient.    Patient with chronic sickle cell disease and acute crisis at this time responding very well to protocol much improved at time of discharge.    Final diagnoses:   Sickle cell disease with crisis (H)         [facility] EMERGENCY DEPARTMENT  5/15/2025     Jose Eduardo Rush MD  05/16/25 1470

## 2025-05-16 NOTE — DISCHARGE INSTRUCTIONS
Discharge to home continue with your current outpatient medication management with follow-up with your hematologist.

## 2025-05-16 NOTE — ED TRIAGE NOTES
"Pt presents to the ED for evaluation of sickle cell crisis pain. PT states 'My entire body burns.\" Pt describes pain constant burning/aching pain and rates it 8/10. PT has a care plan.      Triage Assessment (Adult)       Row Name 05/15/25 0817          Triage Assessment    Airway WDL WDL        Respiratory WDL    Respiratory WDL WDL        Skin Circulation/Temperature WDL    Skin Circulation/Temperature WDL WDL        Cardiac WDL    Cardiac WDL WDL        Peripheral/Neurovascular WDL    Peripheral Neurovascular WDL WDL        Cognitive/Neuro/Behavioral WDL    Cognitive/Neuro/Behavioral WDL WDL                     "

## 2025-05-17 ENCOUNTER — MYC MEDICAL ADVICE (OUTPATIENT)
Dept: INTERNAL MEDICINE | Facility: CLINIC | Age: 25
End: 2025-05-17
Payer: MEDICAID

## 2025-05-17 DIAGNOSIS — K21.9 GASTROESOPHAGEAL REFLUX DISEASE WITHOUT ESOPHAGITIS: Primary | ICD-10-CM

## 2025-05-17 RX ORDER — PANTOPRAZOLE SODIUM 40 MG/1
40 TABLET, DELAYED RELEASE ORAL DAILY
Qty: 30 TABLET | Refills: 0 | Status: SHIPPED | OUTPATIENT
Start: 2025-05-17

## 2025-05-17 NOTE — CONFIDENTIAL NOTE
"Health Psychology - Follow up Visit  Confidential Summary*     The author of this note documented a reason for not sharing it with the patient.     REFERRAL SOURCE:  PCP     CHIEF COMPLAINT/REASON FOR VISIT  Cognitive behavioral therapy and behavioral counseling in context of chronic pain associated with sickle cell disease with recurrent hospitalizations and asthma and emotion dysregulation with current depressed mood.       Patient seen for 60 minute psychotherapy session.  Patient was at home but took call from her moms car and provider was at her home.  Session provided via [employer].      Patient has agreed to receiving telehealth services.      The patient has been notified of following:   \"This video visit was conducted via video call between you and your physician/provider. We have found that certain health care needs can be provided without the need for an in-person physical exam. Video visits may be billed at different rates depending on your insurance coverage.  Please reach out to your insurance provider with any questions. If during the course of the call the physician/provider feels a video visit is not appropriate, you will not be charged for this service.\"     Patient has given verbal consent for telephone/Video visit? Yes       Subjective:  Patient began with report that she decided to take our call from her moms car in order that she might have more privacy.  It was noted that this is the first time I have been able to see patient fully because she typically takes video visits in her dark bedroom.  She reported that she loved her family but that she believes that she shuts them out of her life for good reason.  She went on to explain that she feels misunderstood by her mother, also her best friend, and that her mother is described as being unpredictable, at times being loving toward patient and at other times behaving in a way that is not respectful toward her.  She reported that her mother has not " "defended her when her family accuses her of exaggerating her pain symptoms in order to secure pain medication.      She reported that she is currently in an argument with her best friend who invited her to consider that she and her family might benefit from family therapy.  Patient voiced belief that her friend suggestion was not warranted and that she found it disrespectful.  Attempts to encourage patient to consider position of friend were met with irritation.  Patient voiced concern that I may have been attempting to put my own spin on her situation.  Agreed to begin session again and that she wants to be heard and that my attempts to convey understanding with a reframe were not appreciated as I did not accurately perceive the intention she was trying to convey.  \"Are you trying to create a picture?\"    She described emotional sensitivity and that she will withdrawal when she feels overwhelmed.  She re    Patient has another therapist that she has been seeing for 4 months and that \"listens to her\" without judgement.  It may be appropriate to consider DBT treatment for patient.  Will attempt to secure release to discuss with other therapist.      Objective:  Patient was on time for today s session, appropriately groomed and dressed.  She appeared but alert and oriented. Mood was dysphoric, with appropriate range of affect. Patient denied suicidal or assaultive ideation, plan, or intent.        Assessment:  The patient is coping with sickle cell disease and perception of racism associated with accusations that she might be misusing pain medications.  She also described challenges with regulating her emotions and having recurrent interpersonal discord.  She also described chronic depressive symptoms and that she \"cries daily\".  She has an outside therapist and would like our sessions to focus on increasing her awareness of emotional health.     Plan:  See in 1 week, encouraged engaging in weekly therapy.       Time " In:  10:00  Time Out: 11:00     Diagnosis:  Axis I Persistent depressive disorder vs recurrent MDD, current episode moderate; chronic pain; psychological factors associated with medical condition   Axis II Deferred   Axis III please see medical records for details   [place] IV Psychosocial and Environmental Stressors:Health and racial disparities         TREATMENT PLAN:  this session     Shandra Caruso, PhD, LP        *In accordance with the Rules of the [place] Board of [employer], it is noted that psychological descriptions and scientific procedures underlying psychological evaluations have limitations.  Absolute predictions cannot be made based on information in this report.

## 2025-05-18 NOTE — CONFIDENTIAL NOTE
"Health Psychology - Follow up Visit  Confidential Summary*     The author of this note documented a reason for not sharing it with the patient.     REFERRAL SOURCE:  PCP     CHIEF COMPLAINT/REASON FOR VISIT  Cognitive behavioral therapy and behavioral counseling in context of chronic pain associated with sickle cell disease with recurrent hospitalizations and asthma and emotion dysregulation with current depressed mood.       Patient seen for 60 minute psychotherapy session.  Patient was at her home and provider was at her home.  Session provided via [employer].      Patient has agreed to receiving telehealth services.      The patient has been notified of following:   \"This video visit was conducted via video call between you and your physician/provider. We have found that certain health care needs can be provided without the need for an in-person physical exam. Video visits may be billed at different rates depending on your insurance coverage.  Please reach out to your insurance provider with any questions. If during the course of the call the physician/provider feels a video visit is not appropriate, you will not be charged for this service.\"     Patient has given verbal consent for telephone/Video visit? Yes       Subjective:  Patient began with report that she has become increasingly isolated, spending most of her life in her room.  She described a close relationship with her family but that her pain condition limits her ability to tolerate emotional distress and that she isolates as a way to protect herself.  She described being confronted by mother about her behavior and that it takes her a few days to return to \"normal\".  Discussed emotion dysregulation and the observation that she has big emotions that are activated easily, remain for a long time and are elevated more than expected.  She described vulnerability factors to emotionality to include not being heard and gaslighting or being told things about herself " "that are not true.      She described having only 10 home pain pills and that she is being asked to rely on fluids and pain medications delivered via infusion to manage pain but that this does not adequately address pain condition.  She reported that she also has some relief following hot shower, warm blankets and heating pads.      She reported that she has a few \"addicts\" in her family and that her family therefore, does not understand or appreciate her need for medication.      Objective:  Patient was on time for today s session, appropriately groomed and dressed.  Of note, after initial greeting, patient chose to keep self out of video with lights off.  She appeared friendly, alert and oriented. Mood was euthymic, with appropriate range of affect. Patient denied suicidal or assaultive ideation, plan, or intent.   Of note, patient was encouraged to open blind in dark room so that I could see her but this did not bring much light into room.       Assessment:  The patient is coping with sickle cell disease and perception of racism associated with accusations that she might be misusing pain medications.  She also described challenges with regulating her emotions and having recurrent interpersonal discord.  She also described chronic depressive symptoms and that she \"cries daily\".  She has an outside therapist and would like our sessions to focus on increasing her awareness of emotional health.     Plan:  See in 1 week, encouraged engaging in weekly therapy.       Time In:  10:00  Time Out: 11:00     Diagnosis:  Axis I Persistent depressive disorder vs recurrent MDD, current episode moderate; chronic pain; psychological factors associated with medical condition   Axis II Deferred, R/O BPD   [place] III please see medical records for details   [place] IV Psychosocial and Environmental Stressors:Health and racial disparities         TREATMENT PLAN:  complete     Shandra Caruso, PhD, LP        *In accordance with the Rules of " the [place] Board of [employer], it is noted that psychological descriptions and scientific procedures underlying psychological evaluations have limitations.  Absolute predictions cannot be made based on information in this report.

## 2025-05-19 ENCOUNTER — INFUSION THERAPY VISIT (OUTPATIENT)
Dept: TRANSPLANT | Facility: CLINIC | Age: 25
End: 2025-05-19
Attending: PEDIATRICS
Payer: MEDICAID

## 2025-05-19 ENCOUNTER — PATIENT OUTREACH (OUTPATIENT)
Dept: CARE COORDINATION | Facility: CLINIC | Age: 25
End: 2025-05-19

## 2025-05-19 ENCOUNTER — NURSE TRIAGE (OUTPATIENT)
Dept: ONCOLOGY | Facility: CLINIC | Age: 25
End: 2025-05-19

## 2025-05-19 VITALS
RESPIRATION RATE: 16 BRPM | SYSTOLIC BLOOD PRESSURE: 122 MMHG | DIASTOLIC BLOOD PRESSURE: 81 MMHG | TEMPERATURE: 98.3 F | HEART RATE: 86 BPM | OXYGEN SATURATION: 93 %

## 2025-05-19 DIAGNOSIS — G81.10 SPASTIC HEMIPLEGIA, UNSPECIFIED ETIOLOGY, UNSPECIFIED LATERALITY (H): ICD-10-CM

## 2025-05-19 DIAGNOSIS — D57.00 SICKLE CELL PAIN CRISIS (H): Primary | ICD-10-CM

## 2025-05-19 PROCEDURE — 258N000003 HC RX IP 258 OP 636: Performed by: PEDIATRICS

## 2025-05-19 PROCEDURE — 96376 TX/PRO/DX INJ SAME DRUG ADON: CPT

## 2025-05-19 PROCEDURE — 250N000013 HC RX MED GY IP 250 OP 250 PS 637: Performed by: PEDIATRICS

## 2025-05-19 PROCEDURE — 250N000011 HC RX IP 250 OP 636: Performed by: PEDIATRICS

## 2025-05-19 PROCEDURE — 96374 THER/PROPH/DIAG INJ IV PUSH: CPT

## 2025-05-19 PROCEDURE — 96375 TX/PRO/DX INJ NEW DRUG ADDON: CPT

## 2025-05-19 PROCEDURE — 96361 HYDRATE IV INFUSION ADD-ON: CPT

## 2025-05-19 RX ORDER — HEPARIN SODIUM (PORCINE) LOCK FLUSH IV SOLN 100 UNIT/ML 100 UNIT/ML
5 SOLUTION INTRAVENOUS
OUTPATIENT
Start: 2025-08-01

## 2025-05-19 RX ORDER — EPINEPHRINE 1 MG/ML
0.3 INJECTION, SOLUTION INTRAMUSCULAR; SUBCUTANEOUS EVERY 5 MIN PRN
OUTPATIENT
Start: 2025-05-22

## 2025-05-19 RX ORDER — DIPHENHYDRAMINE HYDROCHLORIDE 50 MG/ML
50 INJECTION, SOLUTION INTRAMUSCULAR; INTRAVENOUS
Start: 2025-05-22

## 2025-05-19 RX ORDER — ALBUTEROL SULFATE 0.83 MG/ML
2.5 SOLUTION RESPIRATORY (INHALATION)
OUTPATIENT
Start: 2025-05-22

## 2025-05-19 RX ORDER — METHYLPREDNISOLONE SODIUM SUCCINATE 40 MG/ML
40 INJECTION INTRAMUSCULAR; INTRAVENOUS
Start: 2025-05-22

## 2025-05-19 RX ORDER — DIPHENHYDRAMINE HCL 25 MG
50 CAPSULE ORAL
Status: COMPLETED | OUTPATIENT
Start: 2025-05-19 | End: 2025-05-19

## 2025-05-19 RX ORDER — DIPHENHYDRAMINE HYDROCHLORIDE 50 MG/ML
25 INJECTION, SOLUTION INTRAMUSCULAR; INTRAVENOUS
Start: 2025-05-22

## 2025-05-19 RX ORDER — KETOROLAC TROMETHAMINE 30 MG/ML
30 INJECTION, SOLUTION INTRAMUSCULAR; INTRAVENOUS ONCE
Start: 2025-08-01 | End: 2025-08-01

## 2025-05-19 RX ORDER — ONDANSETRON 4 MG/1
8 TABLET, FILM COATED ORAL
Start: 2025-08-01

## 2025-05-19 RX ORDER — ONDANSETRON 2 MG/ML
8 INJECTION INTRAMUSCULAR; INTRAVENOUS EVERY 6 HOURS PRN
Start: 2025-08-01

## 2025-05-19 RX ORDER — HEPARIN SODIUM (PORCINE) LOCK FLUSH IV SOLN 100 UNIT/ML 100 UNIT/ML
5 SOLUTION INTRAVENOUS
OUTPATIENT
Start: 2025-05-22

## 2025-05-19 RX ORDER — DIPHENHYDRAMINE HCL 25 MG
50 CAPSULE ORAL
Start: 2025-08-01

## 2025-05-19 RX ORDER — HEPARIN SODIUM,PORCINE 10 UNIT/ML
5 VIAL (ML) INTRAVENOUS
OUTPATIENT
Start: 2025-05-22

## 2025-05-19 RX ORDER — KETOROLAC TROMETHAMINE 30 MG/ML
30 INJECTION, SOLUTION INTRAMUSCULAR; INTRAVENOUS ONCE
Status: COMPLETED | OUTPATIENT
Start: 2025-05-19 | End: 2025-05-19

## 2025-05-19 RX ORDER — MEPERIDINE HYDROCHLORIDE 25 MG/ML
25 INJECTION INTRAMUSCULAR; INTRAVENOUS; SUBCUTANEOUS
OUTPATIENT
Start: 2025-05-22

## 2025-05-19 RX ORDER — ONDANSETRON 2 MG/ML
8 INJECTION INTRAMUSCULAR; INTRAVENOUS EVERY 6 HOURS PRN
Status: DISCONTINUED | OUTPATIENT
Start: 2025-05-19 | End: 2025-05-19 | Stop reason: HOSPADM

## 2025-05-19 RX ORDER — HEPARIN SODIUM,PORCINE 10 UNIT/ML
5 VIAL (ML) INTRAVENOUS
OUTPATIENT
Start: 2025-08-01

## 2025-05-19 RX ORDER — HEPARIN SODIUM (PORCINE) LOCK FLUSH IV SOLN 100 UNIT/ML 100 UNIT/ML
5 SOLUTION INTRAVENOUS ONCE
Status: COMPLETED | OUTPATIENT
Start: 2025-05-19 | End: 2025-05-19

## 2025-05-19 RX ORDER — ALBUTEROL SULFATE 90 UG/1
1-2 INHALANT RESPIRATORY (INHALATION)
Start: 2025-05-22

## 2025-05-19 RX ADMIN — SODIUM CHLORIDE, SODIUM LACTATE, POTASSIUM CHLORIDE, AND CALCIUM CHLORIDE 1000 ML: .6; .31; .03; .02 INJECTION, SOLUTION INTRAVENOUS at 10:08

## 2025-05-19 RX ADMIN — HYDROMORPHONE HYDROCHLORIDE 1 MG: 1 INJECTION, SOLUTION INTRAMUSCULAR; INTRAVENOUS; SUBCUTANEOUS at 11:18

## 2025-05-19 RX ADMIN — Medication 5 ML: at 13:44

## 2025-05-19 RX ADMIN — HYDROMORPHONE HYDROCHLORIDE 1 MG: 1 INJECTION, SOLUTION INTRAMUSCULAR; INTRAVENOUS; SUBCUTANEOUS at 10:15

## 2025-05-19 RX ADMIN — DIPHENHYDRAMINE HYDROCHLORIDE 50 MG: 25 CAPSULE ORAL at 10:10

## 2025-05-19 RX ADMIN — ONDANSETRON 8 MG: 2 INJECTION INTRAMUSCULAR; INTRAVENOUS at 10:10

## 2025-05-19 RX ADMIN — KETOROLAC TROMETHAMINE 30 MG: 30 INJECTION, SOLUTION INTRAMUSCULAR; INTRAVENOUS at 11:18

## 2025-05-19 RX ADMIN — HYDROMORPHONE HYDROCHLORIDE 1 MG: 1 INJECTION, SOLUTION INTRAMUSCULAR; INTRAVENOUS; SUBCUTANEOUS at 12:18

## 2025-05-19 ASSESSMENT — PAIN SCALES - GENERAL: PAINLEVEL_OUTOF10: SEVERE PAIN (9)

## 2025-05-19 NOTE — PROGRESS NOTES
Infusion Nursing Note:  Jennifer Cervantes presents today for IVF and pain meds.    Patient seen by provider today: No   present during visit today: Not Applicable.    Note: No labs/no provider visit today. Patient presents c/o 9/10 left leg and left arm pain r/t SSD. Port accessed, VS obtained. 1L LR given over 2 hours. Given 50mg PO Benadryl, 8mg IVP Zofran, 30mg IVP Toradol, and 1mg IVP Dilaudid Q1HR for max of 3 doses. Patient reported pain improvement to 4/10 upon discharge.    Intravenous Access:  Implanted Port.  +BR noted pre and post infusion  De-accessed prior to discharge.    Treatment Conditions:  Not Applicable.    Post Infusion Assessment:  Patient tolerated infusion without incident.     Discharge Plan:   Patient discharged in stable condition accompanied by: self.  Departure Mode: Ambulatory.    Allison Wiggins RN

## 2025-05-19 NOTE — TELEPHONE ENCOUNTER
BMT can take at 10:30     Call placed to Jennifer to let her know of appt at 10:30 she can make it.     Message sent to [facility] to schedule     Message sent to [employer] for ride home only

## 2025-05-19 NOTE — PROGRESS NOTES
Ambulatory Care Management- Transportation Support  Specialty SW - [facility]     Data/Intervention:  Patient Name:    Jennifer Cervantes     /Age: 2000 (25 year old)     [facility] team member assisting Specialty SW with transportation request.      Assessment:  CHW/CTA booked online with [employer] to arrange a ride home from a medical appointment. Transportation was scheduled in conjunction with patient's insurance.   reference number is [id number]    Taxi company: [employer]    Patient will need to call above taxi company at phone number: [phone] when ready for return ride home.      Plan:  Patient is aware of the transportation plan.  available to assist with any other needs.      Lisandra Kilgore MA

## 2025-05-19 NOTE — TELEPHONE ENCOUNTER
Oncology Nurse Triage - Sickle Cell Pain Crisis:    Situation: Jennifer  calling about Sickle Cell Pain Crisis, requesting to be added on for IV fluids and pain medicine    Background:     Patient's last infusion was 5/14/25 er on 5/15/25   Last clinic visit date:4/25/25 Patricia johnson   Does patient have active treatment plan? Yes    Assessment of Symptoms:  Onset/Duration of symptoms: 1 day    Is it typical sickle cell pain? Yes  Location: left leg   Character: Sharp and Cramping  Intensity: 10/10    Any radiation of pain, numbness, tingling, weakness, warmth, swelling, discoloration of arms or legs?No    Fever? No  (if yes max temperature recorded in last 24 hours):      Chest Pain Present: No    Shortness of breath: No    Other home therapies tried: HEAT/HEATING PAD and WARM BATH     Last home medication taken and when: ibuprofen at 10pm     Any Refills Needed?: No    Does patient have transportation & length of time to get to clinic: Yes needs transportation home       Recommendations:     Placed on infusion call list     If you do not hear from the infusion center by 2pm then you will not be able to get in for an infusion today. If symptoms worsen while waiting for call back, and/or you experience fever, chills, SOB, chest pain, cough, n/v, dizziness, numbness, swelling, discoloration of extremities, then seek emergency evaluation in Emergency Department.     Please note, if you are late for your appt, you risk losing your infusion appt as it may delay another patient's infusion who arrived on time.

## 2025-05-20 DIAGNOSIS — D57.00 SICKLE CELL DISEASE WITH CRISIS (H): ICD-10-CM

## 2025-05-20 RX ORDER — OXYCODONE HYDROCHLORIDE 10 MG/1
10 TABLET ORAL EVERY 6 HOURS PRN
Qty: 12 TABLET | Refills: 0 | Status: SHIPPED | OUTPATIENT
Start: 2025-05-20

## 2025-05-20 NOTE — TELEPHONE ENCOUNTER
Narcotic Refill Request  Person Requesting Refill: Jennifer    Medication(s) requested:  oxycodone IR 10 mg tablet  Last fill date and by whom:  5/14/25 by Patricia Mantilla CNP  What pain is the medication treating: sickle cell pain  How is the medication being taken?:as needed q 4-6 hours  Does pt have enough for today? Yes  Is pain being adequately controlled on the current regimen?: Yes  Experiencing any side effects from medication?: No    Date of most recent appointment:  4/25/25 with Patricia Mantilla CNP  Any No Show Visits:No  Next appointment:   5/23/25 with Patricia Mantilla CNP     Reviewed: No access    Routed/Paged provider: Patricia Mantilla CNP

## 2025-05-22 ENCOUNTER — NURSE TRIAGE (OUTPATIENT)
Dept: ONCOLOGY | Facility: CLINIC | Age: 25
End: 2025-05-22
Payer: MEDICAID

## 2025-05-22 ENCOUNTER — VIRTUAL VISIT (OUTPATIENT)
Dept: PSYCHOLOGY | Facility: CLINIC | Age: 25
End: 2025-05-22
Payer: MEDICAID

## 2025-05-22 DIAGNOSIS — F54 PSYCHOLOGICAL AND BEHAVIORAL FACTORS ASSOCIATED WITH DISORDERS OR DISEASES CLASSIFIED ELSEWHERE: ICD-10-CM

## 2025-05-22 DIAGNOSIS — D57.1 HB-SS DISEASE WITHOUT CRISIS (H): ICD-10-CM

## 2025-05-22 DIAGNOSIS — F34.1 PERSISTENT DEPRESSIVE DISORDER: Primary | ICD-10-CM

## 2025-05-22 DIAGNOSIS — G89.4 CHRONIC PAIN ASSOCIATED WITH SIGNIFICANT PSYCHOSOCIAL DYSFUNCTION: ICD-10-CM

## 2025-05-22 NOTE — LETTER
7/18/2022         RE: Jennifer Cervantes  [address]  [facility] [address]        Dear Colleague,    Thank you for referring your patient, Jennifer Cervantes, to the [facility] [place]. Please see a copy of my visit note below.    Infusion Nursing Note:  Jennifer Cervantes presents today for add-on infusion.    Patient seen by provider today: No   present during visit today: Not Applicable.     Note: Patient received 1 L D5NaCl bolus over 2 hours, morphine x3 per generic infusion plan. Patient arrived with 9/10 low back pain. Pain improved to 6/10 after interventions. Stable upon discharge.     Intravenous Access:  Implanted Port.     Treatment Conditions:  Results reviewed, labs MET treatment parameters, ok to proceed with treatment.     Post Infusion Assessment:  Patient tolerated infusion without incident.      Discharge Plan:   Patient and/or family verbalized understanding of discharge instructions and all questions answered.         Again, thank you for allowing me to participate in the care of your patient.        Sincerely,        [facility]    
117.5

## 2025-05-23 ENCOUNTER — APPOINTMENT (OUTPATIENT)
Dept: LAB | Facility: CLINIC | Age: 25
End: 2025-05-23
Attending: PEDIATRICS
Payer: MEDICAID

## 2025-05-24 ENCOUNTER — HOSPITAL ENCOUNTER (EMERGENCY)
Facility: CLINIC | Age: 25
Discharge: HOME | End: 2025-05-25
Attending: FAMILY MEDICINE | Admitting: FAMILY MEDICINE
Payer: MEDICAID

## 2025-05-24 DIAGNOSIS — D57.00 SICKLE CELL DISEASE WITH CRISIS (H): ICD-10-CM

## 2025-05-24 LAB
ALBUMIN SERPL BCG-MCNC: 4.4 G/DL (ref 3.5–5.2)
ALP SERPL-CCNC: 62 U/L (ref 40–150)
ALT SERPL W P-5'-P-CCNC: 35 U/L (ref 0–50)
ANION GAP SERPL CALCULATED.3IONS-SCNC: 12 MMOL/L (ref 7–15)
AST SERPL W P-5'-P-CCNC: 54 U/L (ref 0–45)
BASOPHILS # BLD AUTO: 0.2 10E3/UL (ref 0–0.2)
BASOPHILS NFR BLD AUTO: 2 %
BILIRUB SERPL-MCNC: 3 MG/DL
BUN SERPL-MCNC: 8 MG/DL (ref 6–20)
CALCIUM SERPL-MCNC: 8.6 MG/DL (ref 8.8–10.4)
CHLORIDE SERPL-SCNC: 105 MMOL/L (ref 98–107)
CREAT SERPL-MCNC: 0.53 MG/DL (ref 0.51–0.95)
EGFRCR SERPLBLD CKD-EPI 2021: >90 ML/MIN/1.73M2
EOSINOPHIL # BLD AUTO: 0.5 10E3/UL (ref 0–0.7)
EOSINOPHIL NFR BLD AUTO: 4 %
ERYTHROCYTE [DISTWIDTH] IN BLOOD BY AUTOMATED COUNT: 25.3 % (ref 10–15)
GLUCOSE SERPL-MCNC: 101 MG/DL (ref 70–99)
HCO3 SERPL-SCNC: 22 MMOL/L (ref 22–29)
HCT VFR BLD AUTO: 19.2 % (ref 35–47)
HGB BLD-MCNC: 7 G/DL (ref 11.7–15.7)
IMM GRANULOCYTES # BLD: 0.1 10E3/UL
IMM GRANULOCYTES NFR BLD: 1 %
LYMPHOCYTES # BLD AUTO: 2.7 10E3/UL (ref 0.8–5.3)
LYMPHOCYTES NFR BLD AUTO: 21 %
MCH RBC QN AUTO: 30.7 PG (ref 26.5–33)
MCHC RBC AUTO-ENTMCNC: 36.5 G/DL (ref 31.5–36.5)
MCV RBC AUTO: 84 FL (ref 78–100)
MCV RBC AUTO: 86 FL (ref 78–100)
MONOCYTES # BLD AUTO: 1 10E3/UL (ref 0–1.3)
MONOCYTES NFR BLD AUTO: 8 %
NEUTROPHILS # BLD AUTO: 8.1 10E3/UL (ref 1.6–8.3)
NEUTROPHILS NFR BLD AUTO: 64 %
NRBC # BLD AUTO: 0.3 10E3/UL
NRBC BLD AUTO-RTO: 2 /100
PLATELET # BLD AUTO: 458 10E3/UL (ref 150–450)
POTASSIUM SERPL-SCNC: 4 MMOL/L (ref 3.4–5.3)
PROT SERPL-MCNC: 7.5 G/DL (ref 6.4–8.3)
RBC # BLD AUTO: 2.28 10E6/UL (ref 3.8–5.2)
RETICS # AUTO: NORMAL 10*3/UL
RETICS/RBC NFR AUTO: NORMAL %
SODIUM SERPL-SCNC: 139 MMOL/L (ref 135–145)
WBC # BLD AUTO: 12.7 10E3/UL (ref 4–11)

## 2025-05-24 PROCEDURE — 93010 ELECTROCARDIOGRAM REPORT: CPT | Performed by: FAMILY MEDICINE

## 2025-05-24 PROCEDURE — 96375 TX/PRO/DX INJ NEW DRUG ADDON: CPT | Performed by: FAMILY MEDICINE

## 2025-05-24 PROCEDURE — 99284 EMERGENCY DEPT VISIT MOD MDM: CPT | Mod: 25 | Performed by: FAMILY MEDICINE

## 2025-05-24 PROCEDURE — 80053 COMPREHEN METABOLIC PANEL: CPT | Performed by: FAMILY MEDICINE

## 2025-05-24 PROCEDURE — 258N000003 HC RX IP 258 OP 636: Performed by: FAMILY MEDICINE

## 2025-05-24 PROCEDURE — 93005 ELECTROCARDIOGRAM TRACING: CPT | Performed by: FAMILY MEDICINE

## 2025-05-24 PROCEDURE — 36415 COLL VENOUS BLD VENIPUNCTURE: CPT | Performed by: FAMILY MEDICINE

## 2025-05-24 PROCEDURE — 96374 THER/PROPH/DIAG INJ IV PUSH: CPT | Performed by: FAMILY MEDICINE

## 2025-05-24 PROCEDURE — 85004 AUTOMATED DIFF WBC COUNT: CPT | Performed by: FAMILY MEDICINE

## 2025-05-24 PROCEDURE — 99284 EMERGENCY DEPT VISIT MOD MDM: CPT | Performed by: FAMILY MEDICINE

## 2025-05-24 PROCEDURE — 96361 HYDRATE IV INFUSION ADD-ON: CPT | Performed by: FAMILY MEDICINE

## 2025-05-24 PROCEDURE — 250N000011 HC RX IP 250 OP 636: Performed by: FAMILY MEDICINE

## 2025-05-24 PROCEDURE — 85045 AUTOMATED RETICULOCYTE COUNT: CPT | Performed by: FAMILY MEDICINE

## 2025-05-24 PROCEDURE — 96376 TX/PRO/DX INJ SAME DRUG ADON: CPT | Performed by: FAMILY MEDICINE

## 2025-05-24 RX ORDER — HYDROMORPHONE HYDROCHLORIDE 1 MG/ML
1 INJECTION, SOLUTION INTRAMUSCULAR; INTRAVENOUS; SUBCUTANEOUS ONCE
Refills: 0 | Status: DISCONTINUED | OUTPATIENT
Start: 2025-05-24 | End: 2025-05-24

## 2025-05-24 RX ORDER — HYDROMORPHONE HYDROCHLORIDE 1 MG/ML
1 INJECTION, SOLUTION INTRAMUSCULAR; INTRAVENOUS; SUBCUTANEOUS ONCE
Refills: 0 | Status: COMPLETED | OUTPATIENT
Start: 2025-05-24 | End: 2025-05-24

## 2025-05-24 RX ORDER — HEPARIN SODIUM,PORCINE 10 UNIT/ML
5-10 VIAL (ML) INTRAVENOUS EVERY 24 HOURS
Status: DISCONTINUED | OUTPATIENT
Start: 2025-05-24 | End: 2025-05-25 | Stop reason: HOSPADM

## 2025-05-24 RX ORDER — KETOROLAC TROMETHAMINE 30 MG/ML
30 INJECTION, SOLUTION INTRAMUSCULAR; INTRAVENOUS ONCE
Status: COMPLETED | OUTPATIENT
Start: 2025-05-24 | End: 2025-05-24

## 2025-05-24 RX ORDER — HYDROMORPHONE HYDROCHLORIDE 1 MG/ML
1 INJECTION, SOLUTION INTRAMUSCULAR; INTRAVENOUS; SUBCUTANEOUS ONCE
Refills: 0 | Status: COMPLETED | OUTPATIENT
Start: 2025-05-24 | End: 2025-05-25

## 2025-05-24 RX ORDER — HEPARIN SODIUM,PORCINE 10 UNIT/ML
5-10 VIAL (ML) INTRAVENOUS
Status: DISCONTINUED | OUTPATIENT
Start: 2025-05-24 | End: 2025-05-25 | Stop reason: HOSPADM

## 2025-05-24 RX ORDER — ONDANSETRON 2 MG/ML
4 INJECTION INTRAMUSCULAR; INTRAVENOUS ONCE
Status: COMPLETED | OUTPATIENT
Start: 2025-05-24 | End: 2025-05-24

## 2025-05-24 RX ORDER — HEPARIN SODIUM (PORCINE) LOCK FLUSH IV SOLN 100 UNIT/ML 100 UNIT/ML
5-10 SOLUTION INTRAVENOUS
Status: DISCONTINUED | OUTPATIENT
Start: 2025-05-24 | End: 2025-05-25 | Stop reason: HOSPADM

## 2025-05-24 RX ADMIN — SODIUM CHLORIDE, SODIUM LACTATE, POTASSIUM CHLORIDE, AND CALCIUM CHLORIDE 1000 ML: .6; .31; .03; .02 INJECTION, SOLUTION INTRAVENOUS at 21:02

## 2025-05-24 RX ADMIN — HYDROMORPHONE HYDROCHLORIDE 1 MG: 1 INJECTION, SOLUTION INTRAMUSCULAR; INTRAVENOUS; SUBCUTANEOUS at 20:45

## 2025-05-24 RX ADMIN — ONDANSETRON 4 MG: 2 INJECTION INTRAMUSCULAR; INTRAVENOUS at 20:45

## 2025-05-24 RX ADMIN — KETOROLAC TROMETHAMINE 30 MG: 30 INJECTION, SOLUTION INTRAMUSCULAR at 20:45

## 2025-05-24 RX ADMIN — HYDROMORPHONE HYDROCHLORIDE 1 MG: 1 INJECTION, SOLUTION INTRAMUSCULAR; INTRAVENOUS; SUBCUTANEOUS at 22:18

## 2025-05-24 ASSESSMENT — ACTIVITIES OF DAILY LIVING (ADL)
ADLS_ACUITY_SCORE: 58

## 2025-05-24 NOTE — CONFIDENTIAL NOTE
"Health Psychology - Follow up Visit  Confidential Summary*     The author of this note documented a reason for not sharing it with the patient.     REFERRAL SOURCE:  PCP     CHIEF COMPLAINT/REASON FOR VISIT  Cognitive behavioral therapy and behavioral counseling in context of chronic pain associated with sickle cell disease with recurrent hospitalizations and asthma and emotion dysregulation with current depressed mood.       Patient seen for 60 minute psychotherapy session.  Patient was at home but took call from her moms car and provider was at her home.  Session provided via [employer].      Patient has agreed to receiving telehealth services.      The patient has been notified of following:   \"This video visit was conducted via video call between you and your physician/provider. We have found that certain health care needs can be provided without the need for an in-person physical exam. Video visits may be billed at different rates depending on your insurance coverage.  Please reach out to your insurance provider with any questions. If during the course of the call the physician/provider feels a video visit is not appropriate, you will not be charged for this service.\"     Patient has given verbal consent for telephone/Video visit? Yes       Subjective:  Patient began with report that she has been isolating herself from her family because she is afraid of being misunderstood and hurt and being pushed into a position where she ends up \"crashing out\".  She reported that she is experiencing increased pain and is frustrated that the sickle cell clinic has encouraged her to wait until her appointment in infusion tomorrow versus receiving pain medication in the ED today.  She described a perception that her care team and family/friends do not understand what it is like for her to experience the pain that she is often in.  We discussed current methods to manage pain and she endorsed sleeping, massages, warm showers, but " "she reports that these methods do not work sufficiently.  She described frustration that her family accuses her of drug dependence when she is taking medication as prescribed.      Objective:  Patient was on time for today s session, appropriately groomed and dressed.  She appeared but alert and oriented. Mood was dysphoric, with appropriate range of affect including tearfulness. Patient denied suicidal or assaultive ideation, plan, or intent.        Assessment:  The patient is coping with sickle cell disease and perception of racism associated with accusations that she might be misusing pain medications.  She also described challenges with regulating her emotions and having recurrent interpersonal discord.  She also described chronic depressive symptoms and that she \"cries daily\".  She has an outside therapist and would like our sessions to focus on increasing her awareness of emotional health.     Plan:  See in 1 week, encouraged engaging in weekly therapy.       Time In:  10:00  Time Out: 11:00     Diagnosis:  Axis I Persistent depressive disorder vs recurrent MDD, current episode moderate; chronic pain; psychological factors associated with medical condition   Axis II Deferred   Axis III please see medical records for details   [place] IV Psychosocial and Environmental Stressors:Health and racial disparities         TREATMENT PLAN:  this session     Shandra Caruso, PhD, LP        *In accordance with the Rules of the [place] Board of [employer], it is noted that psychological descriptions and scientific procedures underlying psychological evaluations have limitations.  Absolute predictions cannot be made based on information in this report.             "

## 2025-05-25 VITALS
SYSTOLIC BLOOD PRESSURE: 121 MMHG | WEIGHT: 157 LBS | DIASTOLIC BLOOD PRESSURE: 66 MMHG | RESPIRATION RATE: 14 BRPM | HEART RATE: 107 BPM | HEIGHT: 64 IN | BODY MASS INDEX: 26.8 KG/M2 | TEMPERATURE: 98 F | OXYGEN SATURATION: 91 %

## 2025-05-25 PROCEDURE — 250N000011 HC RX IP 250 OP 636: Mod: JZ | Performed by: FAMILY MEDICINE

## 2025-05-25 PROCEDURE — 96376 TX/PRO/DX INJ SAME DRUG ADON: CPT | Performed by: FAMILY MEDICINE

## 2025-05-25 RX ORDER — HEPARIN SODIUM (PORCINE) LOCK FLUSH IV SOLN 100 UNIT/ML 100 UNIT/ML
100 SOLUTION INTRAVENOUS ONCE
Status: DISCONTINUED | OUTPATIENT
Start: 2025-05-25 | End: 2025-05-25 | Stop reason: HOSPADM

## 2025-05-25 RX ADMIN — HEPARIN SODIUM (PORCINE) LOCK FLUSH IV SOLN 100 UNIT/ML 5 ML: 100 SOLUTION at 01:33

## 2025-05-25 RX ADMIN — HYDROMORPHONE HYDROCHLORIDE 1 MG: 1 INJECTION, SOLUTION INTRAMUSCULAR; INTRAVENOUS; SUBCUTANEOUS at 00:16

## 2025-05-25 ASSESSMENT — ACTIVITIES OF DAILY LIVING (ADL): ADLS_ACUITY_SCORE: 58

## 2025-05-25 NOTE — ED NOTES
"Writer went to her patient's room to give her  third Dilaudid dose.Upon entering the room, writer found patient somnolent and not following directions. Writer asked patient to rate her pain level three times, but patient kept moaning with slurred speech and unable to be fully engage normal conversation or answer questions.Writer obtained vials and found her oxygen to be 88%. Writer placed patient three litters of oxygen and left the room to consult the doctor regarding medications administration.Writer come back to the patient's room with doctor and he informed patient that he will wait the third dose for 30 more minutes until her condition improves. Pt become very angary and frustrated that she couldn't get the medication at that moment. Pt turned to writer while doctor still talking to her and educating her about the pain medication and plan of care. \" You shouldn't \" have talked to me\" \" I always de-sate with dilaudid\". \"I know my body\", \" And I always get the it no matter what\". Writer explained to patient and educated safe medication administration and reason writer had to consult the MD about it. Pt become more agitated and demanded a another nurse.    "

## 2025-05-25 NOTE — ED PROVIDER NOTES
[facility] EMERGENCY DEPARTMENT ([facility])    5/24/25            History     Chief Complaint   Patient presents with    Back Pain    Leg Pain     left    Sickle Cell Pain Crisis     HPI  Jennifer Cervantes is a 25 year old female who with past medical history of sickle cell anemia, hemiplegia due to cerebral infarct, and stroke who presents to the emergency department due to back pain, leg pain and sickle cell pain crisis.    As per epic review: Patient presented to [facility] on 5/15/2025 due to sickle cell disease with crisis.  Patient was responding well to the protocol and was improved at time of discharge.    She now notes that she has been having treatment for her sickle cell disease with the [facility] protocol which oftentimes exacerbates her pain she notes joint pain back pain denies any specific chest pain at this time patient attempted to manage the pain at home with outpatient meds but was unsuccessful.      Past Medical History  Past Medical History:   Diagnosis Date    Anxiety     Bleeding disorder     Blood clotting disorder     Cerebral infarction (H) 2015    Cognitive developmental delay     low IQ. 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 side (H)     right hand contractures    Iron overload due to repeated red blood cell transfusions     Migraines     Multiple subsegmental pulmonary emboli without acute cor pulmonale (H) 02/01/2021    Oppositional defiant behavior     Presence of intrauterine contraceptive device 2/18/2020    Superficial venous thrombosis of arm, right 03/25/2021    Uncomplicated asthma      Past Surgical History:   Procedure Laterality Date    AS INSERT TUNNELED CV 2 CATH W/O PORT/PUMP      CHOLECYSTECTOMY      CV RIGHT HEART CATH MEASUREMENTS RECORDED N/A 11/18/2021    Procedure: Right Heart Cath;  Surgeon: Jackson Stauffer MD;  Location:   HEART CARDIAC CATH LAB    INSERT PORT VASCULAR ACCESS Left 4/21/2021    Procedure: INSERTION, VASCULAR ACCESS PORT (NOT SURE ON SIDE UNTIL REMOVAL);  Surgeon: Rajan More MD;  Location: [facility] OR    IR CHEST PORT PLACEMENT > 5 YRS OF AGE  4/21/2021    IR CVC NON TUNNEL LINE REMOVAL  5/6/2021    IR CVC NON TUNNEL PLACEMENT > 5 YRS  04/07/2020    IR CVC NON TUNNEL PLACEMENT > 5 YRS  4/30/2021    IR CVC NON TUNNEL PLACEMENT > 5 YRS  9/7/2022    IR PORT REMOVAL LEFT  4/21/2021    REMOVE PORT VASCULAR ACCESS Left 4/21/2021    Procedure: REMOVAL, VASCULAR ACCESS PORT LEFT;  Surgeon: Rajan More MD;  Location: [facility] OR    REPAIR TENDON ELBOW Right 10/02/2019    Procedure: Right Elbow Flexor Lengthening, Flexor Pronator Slide Of Wrist and Finger, Thumb Adductor Lengthening;  Surgeon: Anai Franco MD;  Location: [facility] OR    TONSILLECTOMY Bilateral 10/02/2019    Procedure: Bilateral Tonsillectomy;  Surgeon: Farhana Guy MD;  Location: [facility] OR    ZZC BREAST REDUCTION (INCLUDES LIPO) TIER 3 Bilateral 04/23/2019     ibuprofen (ADVIL/MOTRIN) 800 MG tablet  albuterol (PROVENTIL) (2.5 MG/3ML) 0.083% neb solution  aspirin (ASA) 81 MG chewable tablet  budesonide-formoterol (SYMBICORT/BREYNA) 160-4.5 MCG/ACT Inhaler  deferasirox (JADENU) 360 MG tablet  Deferiprone, Twice Daily, 1000 MG TABS  diclofenac (VOLTAREN) 1 % topical gel  diphenhydrAMINE (BENADRYL) 50 MG capsule  EPINEPHrine (ANY BX GENERIC EQUIV) 0.3 MG/0.3ML injection 2-pack  hydroxyurea (HYDREA) 500 MG capsule  methocarbamol (ROBAXIN) 750 MG tablet  methylPREDNISolone (MEDROL) 32 MG tablet  naloxone (NARCAN) 4 MG/0.1ML nasal spray  naproxen (NAPROSYN) 500 MG tablet  oxyCODONE IR (ROXICODONE) 10 MG tablet  pantoprazole (PROTONIX) 40 MG EC tablet      Allergies   Allergen Reactions    Contrast Dye Angioedema     Hives and breathing issues    Fish-Derived Products Hives    Seafood Hives    Adhesive Tape Hives     Primipore dressing causes hives     "Gadolinium     Iodinated Contrast Media      Family History  Family History   Problem Relation Age of Onset    Sickle Cell Trait Mother     Hypertension Mother     Asthma Mother     Sickle Cell Trait Father     Glaucoma No family hx of     Macular Degeneration No family hx of     Diabetes No family hx of     Gout No family hx of      Social History   Social History     Tobacco Use    Smoking status: Never     Passive exposure: Never    Smokeless tobacco: Never   Substance Use Topics    Alcohol use: Not Currently     Alcohol/week: 0.0 standard drinks of alcohol    Drug use: Never      Past medical history, past surgical history, medications, allergies, family history, and social history were reviewed with the patient. No additional pertinent items.   A complete review of systems was performed with pertinent positives and negatives noted in the HPI, and all other systems negative.    Physical Exam   BP: 117/70  Pulse: 107  Temp: 98.2  F (36.8  C)  Resp: 18  Height: 162.6 cm (5' 4\")  Weight: 71.2 kg (157 lb)  SpO2: 94 %  Physical Exam  Constitutional:       General: She is not in acute distress.     Appearance: Normal appearance. She is not diaphoretic.   HENT:      Head: Atraumatic.      Mouth/Throat:      Mouth: Mucous membranes are moist.   Eyes:      General: No scleral icterus.     Conjunctiva/sclera: Conjunctivae normal.   Cardiovascular:      Rate and Rhythm: Normal rate.      Heart sounds: Normal heart sounds.   Pulmonary:      Effort: No respiratory distress.      Breath sounds: Normal breath sounds.   Abdominal:      General: Abdomen is flat.   Musculoskeletal:      Cervical back: Neck supple.   Skin:     General: Skin is warm.      Findings: No rash.   Neurological:      General: No focal deficit present.      Mental Status: She is alert and oriented to person, place, and time.      Sensory: No sensory deficit.      Motor: No weakness.      Coordination: Coordination normal.   Psychiatric:         Mood and " Affect: Mood is anxious.         Behavior: Behavior is cooperative.           ED Course, Procedures, & Data      Procedures         EKG Interpretation:      Interpreted by Jose Eduardo Rush MD  Time reviewed:1941   Symptoms at time of EKG: Sickle crisis  Rhythm: normal sinus   Rate: Tachy  Axis: NORMAL  Ectopy: none  Conduction: normal  ST Segments/ T Waves: No ST-T wave changes  Q Waves: none  Comparison to prior: Unchanged    Clinical Impression: Sinus tachycardia     Results for orders placed or performed during the hospital encounter of 05/24/25   Comprehensive metabolic panel     Status: Abnormal   Result Value Ref Range    Sodium 139 135 - 145 mmol/L    Potassium 4.0 3.4 - 5.3 mmol/L    Carbon Dioxide (CO2) 22 22 - 29 mmol/L    Anion Gap 12 7 - 15 mmol/L    Urea Nitrogen 8.0 6.0 - 20.0 mg/dL    Creatinine 0.53 0.51 - 0.95 mg/dL    GFR Estimate >90 >60 mL/min/1.73m2    Calcium 8.6 (L) 8.8 - 10.4 mg/dL    Chloride 105 98 - 107 mmol/L    Glucose 101 (H) 70 - 99 mg/dL    Alkaline Phosphatase 62 40 - 150 U/L    AST 54 (H) 0 - 45 U/L    ALT 35 0 - 50 U/L    Protein Total 7.5 6.4 - 8.3 g/dL    Albumin 4.4 3.5 - 5.2 g/dL    Bilirubin Total 3.0 (H) <=1.2 mg/dL   Reticulocyte count     Status: None   Result Value Ref Range    % Reticulocyte      Absolute Reticulocyte      MCV 86 78 - 100 fL    Narrative    Interfering substance present that may interfere with reticulocyte quantification. Recommend peripheral smear for pathologist review if clinically indicated.   CBC with platelets and differential     Status: Abnormal   Result Value Ref Range    WBC Count 12.7 (H) 4.0 - 11.0 10e3/uL    RBC Count 2.28 (L) 3.80 - 5.20 10e6/uL    Hemoglobin 7.0 (L) 11.7 - 15.7 g/dL    Hematocrit 19.2 (L) 35.0 - 47.0 %    MCV 84 78 - 100 fL    MCH 30.7 26.5 - 33.0 pg    MCHC 36.5 31.5 - 36.5 g/dL    RDW 25.3 (H) 10.0 - 15.0 %    Platelet Count 458 (H) 150 - 450 10e3/uL    % Neutrophils 64 %    % Lymphocytes 21 %    % Monocytes 8 %     % Eosinophils 4 %    % Basophils 2 %    % Immature Granulocytes 1 %    NRBCs per 100 WBC 2 (H) <1 /100    Absolute Neutrophils 8.1 1.6 - 8.3 10e3/uL    Absolute Lymphocytes 2.7 0.8 - 5.3 10e3/uL    Absolute Monocytes 1.0 0.0 - 1.3 10e3/uL    Absolute Eosinophils 0.5 0.0 - 0.7 10e3/uL    Absolute Basophils 0.2 0.0 - 0.2 10e3/uL    Absolute Immature Granulocytes 0.1 <=0.4 10e3/uL    Absolute NRBCs 0.3 10e3/uL   EKG 12 lead     Status: None (Preliminary result)   Result Value Ref Range    Systolic Blood Pressure  mmHg    Diastolic Blood Pressure  mmHg    Ventricular Rate 101 BPM    Atrial Rate 101 BPM    WA Interval 154 ms    QRS Duration 76 ms     ms    QTc 471 ms    P Axis 78 degrees    R AXIS 60 degrees    T Axis 6 degrees    Interpretation ECG Sinus tachycardia  Otherwise normal ECG      CBC with platelets differential     Status: Abnormal    Narrative    The following orders were created for panel order CBC with platelets differential.  Procedure                               Abnormality         Status                     ---------                               -----------         ------                     CBC with platelets and ...[[id number]]  Abnormal            Final result                 Please view results for these tests on the individual orders.     Medications   sodium chloride (PF) 0.9% PF flush 10-20 mL (has no administration in time range)   sodium chloride (PF) 0.9% PF flush 10-20 mL (has no administration in time range)   sodium chloride (PF) 0.9% PF flush 10-20 mL (has no administration in time range)   heparin lock flush 10 unit/mL injection 5-10 mL (has no administration in time range)   heparin lock flush 10 unit/mL injection 5-10 mL (has no administration in time range)   heparin lock flush 100 unit/mL injection 5-10 mL (has no administration in time range)   HYDROmorphone (PF) (DILAUDID) injection 1 mg (has no administration in time range)   lactated ringers BOLUS 1,000 mL (0 mLs  Intravenous Stopped 5/24/25 2335)   HYDROmorphone (PF) (DILAUDID) injection 1 mg (1 mg Intravenous $Given 5/24/25 2045)   ketorolac (TORADOL) injection 30 mg (30 mg Intravenous $Given 5/24/25 2045)   ondansetron (ZOFRAN) injection 4 mg (4 mg Intravenous $Given 5/24/25 2045)   HYDROmorphone (PF) (DILAUDID) injection 1 mg (1 mg Intravenous $Given 5/24/25 2218)     Labs Ordered and Resulted from Time of ED Arrival to Time of ED Departure   COMPREHENSIVE METABOLIC PANEL - Abnormal       Result Value    Sodium 139      Potassium 4.0      Carbon Dioxide (CO2) 22      Anion Gap 12      Urea Nitrogen 8.0      Creatinine 0.53      GFR Estimate >90      Calcium 8.6 (*)     Chloride 105      Glucose 101 (*)     Alkaline Phosphatase 62      AST 54 (*)     ALT 35      Protein Total 7.5      Albumin 4.4      Bilirubin Total 3.0 (*)    CBC WITH PLATELETS AND DIFFERENTIAL - Abnormal    WBC Count 12.7 (*)     RBC Count 2.28 (*)     Hemoglobin 7.0 (*)     Hematocrit 19.2 (*)     MCV 84      MCH 30.7      MCHC 36.5      RDW 25.3 (*)     Platelet Count 458 (*)     % Neutrophils 64      % Lymphocytes 21      % Monocytes 8      % Eosinophils 4      % Basophils 2      % Immature Granulocytes 1      NRBCs per 100 WBC 2 (*)     Absolute Neutrophils 8.1      Absolute Lymphocytes 2.7      Absolute Monocytes 1.0      Absolute Eosinophils 0.5      Absolute Basophils 0.2      Absolute Immature Granulocytes 0.1      Absolute NRBCs 0.3     RETICULOCYTE COUNT    % Reticulocyte        Absolute Reticulocyte        MCV 86       No orders to display          Critical care was not performed.     Medical Decision Making  The patient's presentation was of moderate complexity (a chronic illness mild to moderate exacerbation, progression, or side effect of treatment).    The patient's evaluation involved:  ordering and/or review of 3+ test(s) in this encounter (see separate area of note for details)    The patient's management necessitated high risk (a  parenteral controlled substance) and high risk (a decision regarding hospitalization).    Assessment & Plan        I have reviewed the nursing notes. I have reviewed the findings, diagnosis, plan and need for follow up with the patient.    Patient with long history of sickle cell disease crisis patient arrives emergency room with typical exacerbation including joint pain and back pain at this time patient is markedly improved with pain medication protocol.  Patient will continue to be followed on an outpatient basis and will be discharged from the emergency room.    Final diagnoses:   Sickle cell disease with crisis (H)       Jose Eduardo Rush  [facility] EMERGENCY DEPARTMENT  5/24/2025     Jose Eduardo Rush MD  05/24/25 [id number]

## 2025-05-25 NOTE — ED TRIAGE NOTES
Pt reports having an infusion yesterday Jr and side effect is pain.  Having back and left leg pain     Triage Assessment (Adult)       Row Name 05/24/25 1932          Triage Assessment    Airway WDL WDL        Respiratory WDL    Respiratory WDL WDL        Skin Circulation/Temperature WDL    Skin Circulation/Temperature WDL WDL        Cardiac WDL    Cardiac WDL WDL        Peripheral/Neurovascular WDL    Peripheral Neurovascular WDL WDL        Cognitive/Neuro/Behavioral WDL    Cognitive/Neuro/Behavioral WDL WDL

## 2025-05-25 NOTE — DISCHARGE INSTRUCTIONS
Discharge to home continue with your current outpatient cares follow-up with hematology or return if marked increase in pain or shortness of breath.

## 2025-05-26 LAB
ATRIAL RATE - MUSE: 101 BPM
DIASTOLIC BLOOD PRESSURE - MUSE: NORMAL MMHG
INTERPRETATION ECG - MUSE: NORMAL
P AXIS - MUSE: 78 DEGREES
PR INTERVAL - MUSE: 154 MS
QRS DURATION - MUSE: 76 MS
QT - MUSE: 364 MS
QTC - MUSE: 471 MS
R AXIS - MUSE: 60 DEGREES
SYSTOLIC BLOOD PRESSURE - MUSE: NORMAL MMHG
T AXIS - MUSE: 6 DEGREES
VENTRICULAR RATE- MUSE: 101 BPM

## 2025-05-27 ENCOUNTER — NURSE TRIAGE (OUTPATIENT)
Dept: ONCOLOGY | Facility: CLINIC | Age: 25
End: 2025-05-27
Payer: MEDICAID

## 2025-05-27 DIAGNOSIS — D57.00 SICKLE CELL DISEASE WITH CRISIS (H): ICD-10-CM

## 2025-05-27 RX ORDER — OXYCODONE HYDROCHLORIDE 10 MG/1
10 TABLET ORAL EVERY 6 HOURS PRN
Qty: 12 TABLET | Refills: 0 | Status: SHIPPED | OUTPATIENT
Start: 2025-05-27

## 2025-05-27 NOTE — TELEPHONE ENCOUNTER
Pt is calling in to ask if there are any openings for infusion. She is coming in to  medications and was checking. Pt informed that currently there are no openings, but we will call her if something becomes available. Pt voiced understanding.

## 2025-05-27 NOTE — TELEPHONE ENCOUNTER
Narcotic Refill Request    Medication(s) requested:  oxycodone  Person Requesting Refill: Patient  What pain is the medication treating: Sickle cell pain  How is the medication being taken?:1 tablet every 6 hrs. States sometimes she will take it every 4 hrs.  Does pt have enough for today? No  Is pain being adequately controlled on the current regimen?: Yes  Experiencing any side effects from medication?: No    Date of most recent appointment:  05/23/25 aida/Patricia Mantilla  Any No Show Visits: No  Next appointment:   07/18/25 wMikael Mantilla  Last fill date and by whom:  05/20/25 by Patricia Mantilla   Reviewed: No access    Routed provider: Patricia Mantilla

## 2025-05-27 NOTE — TELEPHONE ENCOUNTER
Pt is calling back to see if there are any infusion times open yet. Informed pt that there are no infusion openings within or outside of [facility] currently. Pt states she doesn't want to go to the ED, but may call back tomorrow if she cannot get in today. She is currently at the clinic and was checking before going home.

## 2025-05-27 NOTE — TELEPHONE ENCOUNTER
"Oncology Nurse Triage - Sickle Cell Pain Crisis:    Situation: Jennifer  calling about Sickle Cell Pain Crisis, requesting to be added on for IV fluids and pain medicine    Background:     Patient's last infusion was 05/24/25 ED  Last clinic visit date:05/23/25 w/Patricia Mantilla  Does patient have active treatment plan? Yes    Assessment of Symptoms:  Onset/Duration of symptoms: 2 day    Is it typical sickle cell pain? Yes  Location: \"generalized\"  Character: Dull ache  Intensity: 9/10    Any radiation of pain, numbness, tingling, weakness, warmth, swelling, discoloration of arms or legs?No    Fever? No    Chest Pain Present: No    Shortness of breath: No    Other home therapies tried: HEAT/HEATING PAD and WARM BATH     Last home medication taken and when: 10 pm oxycodone    Any Refills Needed?: Yes    Does patient have transportation & length of time to get to clinic: If appt opens up before 11am she has her own transportation. If after 11am, she will need assistance setting up transportation.      Recommendations:   If you do not hear from the infusion center by 2pm then you will not be able to get in for an infusion today. If symptoms worsen while waiting for call back, and/or you experience fever, chills, SOB, chest pain, cough, n/v, dizziness, numbness, swelling, discoloration of extremities, then seek emergency evaluation in Emergency Department.               "

## 2025-05-28 ENCOUNTER — NURSE TRIAGE (OUTPATIENT)
Dept: ONCOLOGY | Facility: CLINIC | Age: 25
End: 2025-05-28

## 2025-05-28 ENCOUNTER — THERAPY VISIT (OUTPATIENT)
Dept: PHYSICAL THERAPY | Facility: CLINIC | Age: 25
End: 2025-05-28
Payer: MEDICAID

## 2025-05-28 ENCOUNTER — OFFICE VISIT (OUTPATIENT)
Dept: INTERNAL MEDICINE | Facility: CLINIC | Age: 25
End: 2025-05-28
Payer: MEDICAID

## 2025-05-28 ENCOUNTER — HOSPITAL ENCOUNTER (EMERGENCY)
Facility: CLINIC | Age: 25
Discharge: HOME | End: 2025-05-28
Attending: FAMILY MEDICINE | Admitting: FAMILY MEDICINE
Payer: MEDICAID

## 2025-05-28 VITALS
RESPIRATION RATE: 16 BRPM | SYSTOLIC BLOOD PRESSURE: 128 MMHG | DIASTOLIC BLOOD PRESSURE: 82 MMHG | HEART RATE: 91 BPM | OXYGEN SATURATION: 95 % | TEMPERATURE: 98.1 F

## 2025-05-28 VITALS
HEART RATE: 98 BPM | TEMPERATURE: 98.4 F | HEIGHT: 64 IN | SYSTOLIC BLOOD PRESSURE: 109 MMHG | DIASTOLIC BLOOD PRESSURE: 79 MMHG | OXYGEN SATURATION: 94 % | WEIGHT: 155.8 LBS | BODY MASS INDEX: 26.6 KG/M2

## 2025-05-28 DIAGNOSIS — Z23 NEED FOR VACCINATION: ICD-10-CM

## 2025-05-28 DIAGNOSIS — D57.00 SICKLE CELL PAIN CRISIS (H): ICD-10-CM

## 2025-05-28 DIAGNOSIS — D57.00 SICKLE CELL CRISIS (H): ICD-10-CM

## 2025-05-28 DIAGNOSIS — H10.13 ALLERGIC CONJUNCTIVITIS, BILATERAL: Primary | ICD-10-CM

## 2025-05-28 DIAGNOSIS — M25.562 ACUTE PAIN OF LEFT KNEE: Primary | ICD-10-CM

## 2025-05-28 DIAGNOSIS — F43.10 PTSD (POST-TRAUMATIC STRESS DISORDER): ICD-10-CM

## 2025-05-28 DIAGNOSIS — J30.9 ALLERGIC RHINITIS, UNSPECIFIED SEASONALITY, UNSPECIFIED TRIGGER: ICD-10-CM

## 2025-05-28 LAB
ALBUMIN SERPL BCG-MCNC: 4.7 G/DL (ref 3.5–5.2)
ALP SERPL-CCNC: 59 U/L (ref 40–150)
ALT SERPL W P-5'-P-CCNC: 30 U/L (ref 0–50)
ANION GAP SERPL CALCULATED.3IONS-SCNC: 14 MMOL/L (ref 7–15)
AST SERPL W P-5'-P-CCNC: 47 U/L (ref 0–45)
BASOPHILS # BLD AUTO: 0.2 10E3/UL (ref 0–0.2)
BASOPHILS NFR BLD AUTO: 2 %
BILIRUB SERPL-MCNC: 3.5 MG/DL
BUN SERPL-MCNC: 8 MG/DL (ref 6–20)
CALCIUM SERPL-MCNC: 8.7 MG/DL (ref 8.8–10.4)
CHLORIDE SERPL-SCNC: 103 MMOL/L (ref 98–107)
CREAT SERPL-MCNC: 0.49 MG/DL (ref 0.51–0.95)
EGFRCR SERPLBLD CKD-EPI 2021: >90 ML/MIN/1.73M2
EOSINOPHIL # BLD AUTO: 0.3 10E3/UL (ref 0–0.7)
EOSINOPHIL NFR BLD AUTO: 2 %
ERYTHROCYTE [DISTWIDTH] IN BLOOD BY AUTOMATED COUNT: 24.3 % (ref 10–15)
GLUCOSE SERPL-MCNC: 86 MG/DL (ref 70–99)
HCO3 SERPL-SCNC: 21 MMOL/L (ref 22–29)
HCT VFR BLD AUTO: 20.8 % (ref 35–47)
HGB BLD-MCNC: 7.3 G/DL (ref 11.7–15.7)
IMM GRANULOCYTES # BLD: 0.1 10E3/UL
IMM GRANULOCYTES NFR BLD: 1 %
LYMPHOCYTES # BLD AUTO: 2.2 10E3/UL (ref 0.8–5.3)
LYMPHOCYTES NFR BLD AUTO: 15 %
MCH RBC QN AUTO: 30.3 PG (ref 26.5–33)
MCHC RBC AUTO-ENTMCNC: 35.1 G/DL (ref 31.5–36.5)
MCV RBC AUTO: 86 FL (ref 78–100)
MONOCYTES # BLD AUTO: 1.2 10E3/UL (ref 0–1.3)
MONOCYTES NFR BLD AUTO: 8 %
NEUTROPHILS # BLD AUTO: 10.8 10E3/UL (ref 1.6–8.3)
NEUTROPHILS NFR BLD AUTO: 74 %
NRBC # BLD AUTO: 0.3 10E3/UL
NRBC BLD AUTO-RTO: 2 /100
PLATELET # BLD AUTO: 437 10E3/UL (ref 150–450)
POTASSIUM SERPL-SCNC: 3.7 MMOL/L (ref 3.4–5.3)
PROT SERPL-MCNC: 8 G/DL (ref 6.4–8.3)
RBC # BLD AUTO: 2.41 10E6/UL (ref 3.8–5.2)
RETICS # AUTO: 0.6 10E6/UL (ref 0.03–0.1)
RETICS/RBC NFR AUTO: 24.8 % (ref 0.5–2)
SODIUM SERPL-SCNC: 138 MMOL/L (ref 135–145)
WBC # BLD AUTO: 14.7 10E3/UL (ref 4–11)

## 2025-05-28 PROCEDURE — 96376 TX/PRO/DX INJ SAME DRUG ADON: CPT | Performed by: FAMILY MEDICINE

## 2025-05-28 PROCEDURE — 85045 AUTOMATED RETICULOCYTE COUNT: CPT | Performed by: FAMILY MEDICINE

## 2025-05-28 PROCEDURE — 97112 NEUROMUSCULAR REEDUCATION: CPT | Mod: GP

## 2025-05-28 PROCEDURE — 97110 THERAPEUTIC EXERCISES: CPT | Mod: GP

## 2025-05-28 PROCEDURE — 99284 EMERGENCY DEPT VISIT MOD MDM: CPT | Performed by: FAMILY MEDICINE

## 2025-05-28 PROCEDURE — 96361 HYDRATE IV INFUSION ADD-ON: CPT | Performed by: FAMILY MEDICINE

## 2025-05-28 PROCEDURE — 99214 OFFICE O/P EST MOD 30 MIN: CPT | Performed by: PEDIATRICS

## 2025-05-28 PROCEDURE — 96374 THER/PROPH/DIAG INJ IV PUSH: CPT | Performed by: FAMILY MEDICINE

## 2025-05-28 PROCEDURE — G2211 COMPLEX E/M VISIT ADD ON: HCPCS | Performed by: PEDIATRICS

## 2025-05-28 PROCEDURE — 258N000003 HC RX IP 258 OP 636: Performed by: FAMILY MEDICINE

## 2025-05-28 PROCEDURE — 82247 BILIRUBIN TOTAL: CPT | Performed by: FAMILY MEDICINE

## 2025-05-28 PROCEDURE — 36415 COLL VENOUS BLD VENIPUNCTURE: CPT | Performed by: FAMILY MEDICINE

## 2025-05-28 PROCEDURE — 85014 HEMATOCRIT: CPT | Performed by: FAMILY MEDICINE

## 2025-05-28 PROCEDURE — 3074F SYST BP LT 130 MM HG: CPT | Performed by: PEDIATRICS

## 2025-05-28 PROCEDURE — 99284 EMERGENCY DEPT VISIT MOD MDM: CPT | Mod: 25 | Performed by: FAMILY MEDICINE

## 2025-05-28 PROCEDURE — 3078F DIAST BP <80 MM HG: CPT | Performed by: PEDIATRICS

## 2025-05-28 PROCEDURE — 250N000011 HC RX IP 250 OP 636: Mod: JZ | Performed by: FAMILY MEDICINE

## 2025-05-28 PROCEDURE — 96375 TX/PRO/DX INJ NEW DRUG ADDON: CPT | Performed by: FAMILY MEDICINE

## 2025-05-28 RX ORDER — HYDROMORPHONE HYDROCHLORIDE 1 MG/ML
1 INJECTION, SOLUTION INTRAMUSCULAR; INTRAVENOUS; SUBCUTANEOUS ONCE
Refills: 0 | Status: COMPLETED | OUTPATIENT
Start: 2025-05-28 | End: 2025-05-28

## 2025-05-28 RX ORDER — ONDANSETRON 2 MG/ML
4 INJECTION INTRAMUSCULAR; INTRAVENOUS EVERY 30 MIN PRN
Status: DISCONTINUED | OUTPATIENT
Start: 2025-05-28 | End: 2025-05-28 | Stop reason: HOSPADM

## 2025-05-28 RX ORDER — KETOTIFEN FUMARATE 0.35 MG/ML
1 SOLUTION/ DROPS OPHTHALMIC 2 TIMES DAILY PRN
Qty: 10 ML | Refills: 11 | Status: SHIPPED | OUTPATIENT
Start: 2025-05-28

## 2025-05-28 RX ORDER — AZELASTINE 1 MG/ML
SPRAY, METERED NASAL
Qty: 30 ML | Refills: 4 | Status: SHIPPED | OUTPATIENT
Start: 2025-05-28

## 2025-05-28 RX ORDER — HEPARIN SODIUM (PORCINE) LOCK FLUSH IV SOLN 100 UNIT/ML 100 UNIT/ML
5-10 SOLUTION INTRAVENOUS
Status: DISCONTINUED | OUTPATIENT
Start: 2025-05-28 | End: 2025-05-28 | Stop reason: HOSPADM

## 2025-05-28 RX ORDER — KETOROLAC TROMETHAMINE 30 MG/ML
30 INJECTION, SOLUTION INTRAMUSCULAR; INTRAVENOUS ONCE
Status: COMPLETED | OUTPATIENT
Start: 2025-05-28 | End: 2025-05-28

## 2025-05-28 RX ADMIN — SODIUM CHLORIDE, SODIUM LACTATE, POTASSIUM CHLORIDE, AND CALCIUM CHLORIDE 1000 ML: .6; .31; .03; .02 INJECTION, SOLUTION INTRAVENOUS at 11:48

## 2025-05-28 RX ADMIN — HYDROMORPHONE HYDROCHLORIDE 1 MG: 1 INJECTION, SOLUTION INTRAMUSCULAR; INTRAVENOUS; SUBCUTANEOUS at 11:45

## 2025-05-28 RX ADMIN — HEPARIN SODIUM (PORCINE) LOCK FLUSH IV SOLN 100 UNIT/ML 5 ML: 100 SOLUTION at 15:26

## 2025-05-28 RX ADMIN — HYDROMORPHONE HYDROCHLORIDE 1 MG: 1 INJECTION, SOLUTION INTRAMUSCULAR; INTRAVENOUS; SUBCUTANEOUS at 13:40

## 2025-05-28 RX ADMIN — ONDANSETRON 4 MG: 2 INJECTION INTRAMUSCULAR; INTRAVENOUS at 11:46

## 2025-05-28 RX ADMIN — KETOROLAC TROMETHAMINE 30 MG: 30 INJECTION, SOLUTION INTRAMUSCULAR at 11:46

## 2025-05-28 RX ADMIN — HYDROMORPHONE HYDROCHLORIDE 1 MG: 1 INJECTION, SOLUTION INTRAMUSCULAR; INTRAVENOUS; SUBCUTANEOUS at 12:32

## 2025-05-28 ASSESSMENT — ASTHMA QUESTIONNAIRES
QUESTION_2 LAST FOUR WEEKS HOW OFTEN HAVE YOU HAD SHORTNESS OF BREATH: MORE THAN ONCE A DAY
HOSPITALIZATION_OVERNIGHT_LAST_YEAR_TOTAL: TWO
QUESTION_1 LAST FOUR WEEKS HOW MUCH OF THE TIME DID YOUR ASTHMA KEEP YOU FROM GETTING AS MUCH DONE AT WORK, SCHOOL OR AT HOME: A LITTLE OF THE TIME
EMERGENCY_ROOM_LAST_YEAR_TOTAL: THREE OR MORE
QUESTION_4 LAST FOUR WEEKS HOW OFTEN HAVE YOU USED YOUR RESCUE INHALER OR NEBULIZER MEDICATION (SUCH AS ALBUTEROL): THREE OR MORE TIMES PER DAY
ACT_TOTALSCORE: 12
QUESTION_5 LAST FOUR WEEKS HOW WOULD YOU RATE YOUR ASTHMA CONTROL: SOMEWHAT CONTROLLED
QUESTION_3 LAST FOUR WEEKS HOW OFTEN DID YOUR ASTHMA SYMPTOMS (WHEEZING, COUGHING, SHORTNESS OF BREATH, CHEST TIGHTNESS OR PAIN) WAKE YOU UP AT NIGHT OR EARLIER THAN USUAL IN THE MORNING: ONCE A WEEK

## 2025-05-28 ASSESSMENT — ACTIVITIES OF DAILY LIVING (ADL)
ADLS_ACUITY_SCORE: 58

## 2025-05-28 ASSESSMENT — PATIENT HEALTH QUESTIONNAIRE - PHQ9: SUM OF ALL RESPONSES TO PHQ QUESTIONS 1-9: 0

## 2025-05-28 NOTE — PROGRESS NOTES
"Dear patient. Thank you for visiting with me. I want you to feel respected, understood, and empowered. \"Respect\" is valuing you as much as I would a close family member. \"Empowerment\" happens when you are fully informed, and can make the best possible decision for you.  Please ask me questions!  Challenge anything that is not clear.    Medical records are primarily used as memory aids for me and my colleagues. Things to know about my documentation style:  - The 'problem list' includes current symptoms or diagnoses, and some problems that are resolved but may return. I use the past medical history for problems not expected to return.  - I use single quotation marks for things that you or I said, when I want to clarify who was speaking.  - I use double quotation marks when copying a term from elsewhere in your records. Italics (besides here) may also denote a quotation.  If you have questions or concerns, please contact me; I will reply as soon as time allows.    Jennifer Cervantes is a 25 year old woman, with concerns including:  Patient presents with:  Follow Up      PCP: Suraj Case   Visit type: problem-oriented      Assessment & Plan       Assessment & Plan  Allergic conjunctivitis, bilateral:  - Allergic conjunctivitis confirmed.  - Prescribe ketotifen eye drops, to be used twice daily as needed.    Sickle cell crisis (H):  - Frequent ER visits due to sickle cell crisis.  - Attempt to schedule infusion appointments to avoid ER visits. Discuss potential home IV fluids with hematologist.    PTSD (post-traumatic stress disorder):  - Signs of PTSD noted by therapist.  - Start sertraline at a lower dose to manage symptoms. Communicate with therapist Shandra Caruso about current status.    Allergic rhinitis, unspecified seasonality, unspecified trigger:  - Allergic rhinitis present.  - Prescribe azelastine nasal spray for use on bad days.      The longitudinal plan of care for the diagnosis(es)/condition(s) as " "documented were addressed during this visit. Due to the added complexity in care, I will continue to support Jennifer in the subsequent management and with ongoing continuity of care.        Subjective   Jennifer is a 25 year old, presenting for the following health issues:  Follow Up        5/28/2025     7:48 AM   Additional Questions   Roomed by EM     History of Present Illness       Reason for visit:  Follow up    She eats 4 or more servings of fruits and vegetables daily.She consumes 1 sweetened beverage(s) daily.She exercises with enough effort to increase her heart rate 9 or less minutes per day.  She exercises with enough effort to increase her heart rate 4 days per week. She is missing 1 dose(s) of medications per week.   Jennifer Cervantes, 25 years    Sickle Cell Crisis  - Experiencing pain and flare-ups without specific triggers, sometimes occurring while at rest.  - Recent ER visit 4 days ago, received fluids and pain medications.  - Difficulty accessing infusion appointments, currently on a waiting list for cancellations.  - Previous ER visit for a swollen index finger with a skin infection.    Allergic Conjunctivitis  - Symptoms include sneezing, tearing, itchy eyes that can swell shut.  - Uses Benadryl as needed for seasonal allergies.            Objective    /79 (BP Location: Right arm, Patient Position: Sitting, Cuff Size: Adult Regular)   Pulse 98   Temp 98.4  F (36.9  C) (Oral)   Ht 1.626 m (5' 4.02\")   Wt 70.7 kg (155 lb 12.8 oz)   SpO2 94%   BMI 26.73 kg/m    Body mass index is 26.73 kg/m .  Physical Exam  Constitutional:       General: She is not in acute distress.     Appearance: Normal appearance. She is not ill-appearing.   HENT:      Head: Normocephalic.      Nose: Nose normal.   Eyes:      General: No scleral icterus.        Right eye: No discharge.         Left eye: No discharge.      Extraocular Movements: Extraocular movements intact.   Pulmonary:      Effort: Pulmonary effort is " normal. No respiratory distress.   Neurological:      Mental Status: She is alert.   Psychiatric:         Mood and Affect: Mood normal.         Behavior: Behavior normal.                    Signed Electronically by: Suraj Case MD

## 2025-05-28 NOTE — ED TRIAGE NOTES
Triage Assessment (Adult)       Row Name 05/28/25 1051          Triage Assessment    Airway WDL WDL        Respiratory WDL    Respiratory WDL WDL        Skin Circulation/Temperature WDL    Skin Circulation/Temperature WDL WDL        Cardiac WDL    Cardiac WDL WDL        Cognitive/Neuro/Behavioral WDL    Cognitive/Neuro/Behavioral WDL WDL

## 2025-05-28 NOTE — ED PROVIDER NOTES
[facility] EMERGENCY DEPARTMENT ([facility])    5/28/25      ED PROVIDER NOTE   History     Chief Complaint   Patient presents with    Sickle Cell Pain Crisis     HPI  Jennifer Cervantes is a 25 year old female with a history of sickle cell anemia, hemiplegia due to cerebral infarct, and stroke, who presents to the ED for evaluation of sickle cell pain crisis.  He states she is experiencing diffuse body pain.  She states this is very typical of prior episodes for which she had presented to the ED.  She attempted to get into the infusion center but was told there were no spots today.  She states she is frustrated.  Denies fever, chills, urinary symptoms or menstrual abnormalities.  Denies any chest pain or shortness of breath.  No vomiting or diarrhea.  Has not noted any skin rashes.        Per chart review, patient was seen most recently on 5/24/2025 for sickle cell pain crises with associated typical exacerbation including joint pain and back pain. Patient  markedly improved with pain medication protocol. Patient advised to. be followed on an outpatient basis.    Past Medical History  Past Medical History:   Diagnosis Date    Anxiety     Bleeding disorder     Blood clotting disorder     Cerebral infarction (H) 2015    Cognitive developmental delay     low IQ. 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 side (H)     right hand contractures    Iron overload due to repeated red blood cell transfusions     Migraines     Multiple subsegmental pulmonary emboli without acute cor pulmonale (H) 02/01/2021    Oppositional defiant behavior     Presence of intrauterine contraceptive device 2/18/2020    Superficial venous thrombosis of arm, right 03/25/2021    Uncomplicated asthma      Past Surgical History:   Procedure Laterality Date    AS INSERT TUNNELED CV 2 CATH W/O PORT/PUMP      CHOLECYSTECTOMY       CV RIGHT HEART CATH MEASUREMENTS RECORDED N/A 11/18/2021    Procedure: Right Heart Cath;  Surgeon: Jackson Stauffer MD;  Location:  HEART CARDIAC CATH LAB    INSERT PORT VASCULAR ACCESS Left 4/21/2021    Procedure: INSERTION, VASCULAR ACCESS PORT (NOT SURE ON SIDE UNTIL REMOVAL);  Surgeon: Rajan More MD;  Location: [facility] OR    IR CHEST PORT PLACEMENT > 5 YRS OF AGE  4/21/2021    IR CVC NON TUNNEL LINE REMOVAL  5/6/2021    IR CVC NON TUNNEL PLACEMENT > 5 YRS  04/07/2020    IR CVC NON TUNNEL PLACEMENT > 5 YRS  4/30/2021    IR CVC NON TUNNEL PLACEMENT > 5 YRS  9/7/2022    IR PORT REMOVAL LEFT  4/21/2021    REMOVE PORT VASCULAR ACCESS Left 4/21/2021    Procedure: REMOVAL, VASCULAR ACCESS PORT LEFT;  Surgeon: Rajan More MD;  Location: [facility] OR    REPAIR TENDON ELBOW Right 10/02/2019    Procedure: Right Elbow Flexor Lengthening, Flexor Pronator Slide Of Wrist and Finger, Thumb Adductor Lengthening;  Surgeon: Anai Franco MD;  Location: [facility] OR    TONSILLECTOMY Bilateral 10/02/2019    Procedure: Bilateral Tonsillectomy;  Surgeon: Farhana Guy MD;  Location: [facility] OR    ZZC BREAST REDUCTION (INCLUDES LIPO) TIER 3 Bilateral 04/23/2019     albuterol (PROVENTIL) (2.5 MG/3ML) 0.083% neb solution  aspirin (ASA) 81 MG chewable tablet  azelastine (ASTELIN) 0.1 % nasal spray  budesonide-formoterol (SYMBICORT/BREYNA) 160-4.5 MCG/ACT Inhaler  deferasirox (JADENU) 360 MG tablet  Deferiprone, Twice Daily, 1000 MG TABS  diclofenac (VOLTAREN) 1 % topical gel  diphenhydrAMINE (BENADRYL) 50 MG capsule  EPINEPHrine (ANY BX GENERIC EQUIV) 0.3 MG/0.3ML injection 2-pack  hydroxyurea (HYDREA) 500 MG capsule  ibuprofen (ADVIL/MOTRIN) 800 MG tablet  ketotifen fumarate 0.035% 0.035 % SOLN ophthalmic solution  methocarbamol (ROBAXIN) 750 MG tablet  methylPREDNISolone (MEDROL) 32 MG tablet  naloxone (NARCAN) 4 MG/0.1ML nasal spray  naproxen (NAPROSYN) 500 MG tablet  oxyCODONE IR (ROXICODONE) 10 MG  tablet  pantoprazole (PROTONIX) 40 MG EC tablet  sertraline (ZOLOFT) 50 MG tablet      Allergies   Allergen Reactions    Contrast Dye Angioedema     Hives and breathing issues    Fish-Derived Products Hives    Seafood Hives    Adhesive Tape Hives     Primipore dressing causes hives    Gadolinium     Iodinated Contrast Media      Family History  Family History   Problem Relation Age of Onset    Sickle Cell Trait Mother     Hypertension Mother     Asthma Mother     Sickle Cell Trait Father     Glaucoma No family hx of     Macular Degeneration No family hx of     Diabetes No family hx of     Gout No family hx of      Social History   Social History     Tobacco Use    Smoking status: Never     Passive exposure: Never    Smokeless tobacco: Never   Substance Use Topics    Alcohol use: Not Currently     Alcohol/week: 0.0 standard drinks of alcohol    Drug use: Never      A medically appropriate review of systems was performed with pertinent positives and negatives noted in the HPI, and all other systems negative.    Physical Exam   BP: (!) 122/90  Pulse: 97  Temp: 98.1  F (36.7  C)  Resp: 18  SpO2: 95 %  Physical Exam  Vitals and nursing note reviewed.   Constitutional:       General: She is not in acute distress.     Appearance: Normal appearance. She is not diaphoretic.   HENT:      Head: Atraumatic.      Mouth/Throat:      Mouth: Mucous membranes are moist.   Eyes:      General: No scleral icterus.     Conjunctiva/sclera: Conjunctivae normal.   Cardiovascular:      Rate and Rhythm: Normal rate.      Heart sounds: Normal heart sounds.   Pulmonary:      Effort: No respiratory distress.      Breath sounds: Normal breath sounds.   Abdominal:      General: Abdomen is flat.   Musculoskeletal:         General: Tenderness (She has generalized tenderness of her joints and muscles in all 4 extremities but no other objective abnormality) present.      Cervical back: Neck supple.   Skin:     General: Skin is warm.      Findings: No  rash.   Neurological:      General: No focal deficit present.      Mental Status: She is alert and oriented to person, place, and time.           ED Course, Procedures, & Data      Procedures                Results for orders placed or performed during the hospital encounter of 05/28/25   Comprehensive metabolic panel     Status: Abnormal   Result Value Ref Range    Sodium 138 135 - 145 mmol/L    Potassium 3.7 3.4 - 5.3 mmol/L    Carbon Dioxide (CO2) 21 (L) 22 - 29 mmol/L    Anion Gap 14 7 - 15 mmol/L    Urea Nitrogen 8.0 6.0 - 20.0 mg/dL    Creatinine 0.49 (L) 0.51 - 0.95 mg/dL    GFR Estimate >90 >60 mL/min/1.73m2    Calcium 8.7 (L) 8.8 - 10.4 mg/dL    Chloride 103 98 - 107 mmol/L    Glucose 86 70 - 99 mg/dL    Alkaline Phosphatase 59 40 - 150 U/L    AST 47 (H) 0 - 45 U/L    ALT 30 0 - 50 U/L    Protein Total 8.0 6.4 - 8.3 g/dL    Albumin 4.7 3.5 - 5.2 g/dL    Bilirubin Total 3.5 (H) <=1.2 mg/dL   Reticulocyte count     Status: Abnormal   Result Value Ref Range    % Reticulocyte 24.8 (H) 0.5 - 2.0 %    Absolute Reticulocyte 0.605 (H) 0.025 - 0.095 10e6/uL   CBC with platelets and differential     Status: Abnormal   Result Value Ref Range    WBC Count 14.7 (H) 4.0 - 11.0 10e3/uL    RBC Count 2.41 (L) 3.80 - 5.20 10e6/uL    Hemoglobin 7.3 (L) 11.7 - 15.7 g/dL    Hematocrit 20.8 (L) 35.0 - 47.0 %    MCV 86 78 - 100 fL    MCH 30.3 26.5 - 33.0 pg    MCHC 35.1 31.5 - 36.5 g/dL    RDW 24.3 (H) 10.0 - 15.0 %    Platelet Count 437 150 - 450 10e3/uL    % Neutrophils 74 %    % Lymphocytes 15 %    % Monocytes 8 %    % Eosinophils 2 %    % Basophils 2 %    % Immature Granulocytes 1 %    NRBCs per 100 WBC 2 (H) <1 /100    Absolute Neutrophils 10.8 (H) 1.6 - 8.3 10e3/uL    Absolute Lymphocytes 2.2 0.8 - 5.3 10e3/uL    Absolute Monocytes 1.2 0.0 - 1.3 10e3/uL    Absolute Eosinophils 0.3 0.0 - 0.7 10e3/uL    Absolute Basophils 0.2 0.0 - 0.2 10e3/uL    Absolute Immature Granulocytes 0.1 <=0.4 10e3/uL    Absolute NRBCs 0.3 10e3/uL    CBC with platelets differential     Status: Abnormal    Narrative    The following orders were created for panel order CBC with platelets differential.  Procedure                               Abnormality         Status                     ---------                               -----------         ------                     CBC with platelets and ...[[id number]]  Abnormal            Final result                 Please view results for these tests on the individual orders.     Medications   ondansetron (ZOFRAN) injection 4 mg (4 mg Intravenous $Given 5/28/25 1146)   lactated ringers BOLUS 1,000 mL (0 mLs Intravenous Stopped 5/28/25 1417)   HYDROmorphone (PF) (DILAUDID) injection 1 mg (1 mg Intravenous $Given 5/28/25 1145)   ketorolac (TORADOL) injection 30 mg (30 mg Intravenous $Given 5/28/25 1146)   HYDROmorphone (PF) (DILAUDID) injection 1 mg (1 mg Intravenous $Given 5/28/25 1232)   HYDROmorphone (PF) (DILAUDID) injection 1 mg (1 mg Intravenous $Given 5/28/25 1340)     Labs Ordered and Resulted from Time of ED Arrival to Time of ED Departure   COMPREHENSIVE METABOLIC PANEL - Abnormal       Result Value    Sodium 138      Potassium 3.7      Carbon Dioxide (CO2) 21 (*)     Anion Gap 14      Urea Nitrogen 8.0      Creatinine 0.49 (*)     GFR Estimate >90      Calcium 8.7 (*)     Chloride 103      Glucose 86      Alkaline Phosphatase 59      AST 47 (*)     ALT 30      Protein Total 8.0      Albumin 4.7      Bilirubin Total 3.5 (*)    RETICULOCYTE COUNT - Abnormal    % Reticulocyte 24.8 (*)     Absolute Reticulocyte 0.605 (*)    CBC WITH PLATELETS AND DIFFERENTIAL - Abnormal    WBC Count 14.7 (*)     RBC Count 2.41 (*)     Hemoglobin 7.3 (*)     Hematocrit 20.8 (*)     MCV 86      MCH 30.3      MCHC 35.1      RDW 24.3 (*)     Platelet Count 437      % Neutrophils 74      % Lymphocytes 15      % Monocytes 8      % Eosinophils 2      % Basophils 2      % Immature Granulocytes 1      NRBCs per 100 WBC 2 (*)     Absolute  Neutrophils 10.8 (*)     Absolute Lymphocytes 2.2      Absolute Monocytes 1.2      Absolute Eosinophils 0.3      Absolute Basophils 0.2      Absolute Immature Granulocytes 0.1      Absolute NRBCs 0.3       No orders to display          Critical care was not performed.     Medical Decision Making  The patient's presentation was of moderate complexity (a chronic illness mild to moderate exacerbation, progression, or side effect of treatment).    The patient's evaluation involved:  review of external note(s) from 3+ sources (review of ED visits for sickle cell pain on 5/24/2025, 5/15/2025, and 5/11/2020)  review of 3+ test result(s) ordered prior to this encounter (open and retake count from last 3 ED visit for comparison to today)  ordering and/or review of 3+ test(s) in this encounter (see separate area of note for details)    The patient's management necessitated moderate risk (prescription drug management including medications given in the ED) and high risk (a parenteral controlled substance).    Assessment & Plan    25-year-old female with a history of sickle cell disease and frequent pain crisis.  She presents today complaining of diffuse myalgias, which she reports is typical of prior episodes of sickle cell pain.  Has been seen in the ED numerous times this month under similar circumstances.  She is not complaining of chest pain or shortness of breath fever chills has no signs of sepsis or other life-threatening complication of sickle cell disease on vital signs, exam, or labs today.  Her reticulocyte count and hemoglobin are near baseline.  She has a care plan in epic, and was treated as such with serial doses of Dilaudid, dose of Toradol, dose of Zofran, IV fluids.  She was observed for over 4 hours and is feeling much better requesting discharge.  I see no indication to encourage admission at this time, this appears to be an acute exacerbation of chronic and she appears stable to be discharged to continue to  follow her outpatient plan of care.  We discussed the indications for emergency department return and follow-up.  Stable for discharge.      I have reviewed the nursing notes. I have reviewed the findings, diagnosis, plan and need for follow up with the patient.    New Prescriptions    No medications on file       Final diagnoses:   Sickle cell pain crisis (H)       Ifeanyi Valdez MD  [facility] EMERGENCY DEPARTMENT  5/28/2025     Ifeanyi Valdez MD  05/28/25 1458     Bilateral Helical Rim Advancement Flap Text: The defect edges were debeveled with a #15 blade scalpel.  Given the location of the defect and the proximity to free margins (helical rim) a bilateral helical rim advancement flap was deemed most appropriate. Using a sterile surgical marker, the appropriate advancement flaps were drawn incorporating the defect and placing the expected incisions between the helical rim and antihelix where possible.  The area thus outlined was incised through and through with a #15 scalpel blade.  With a skin hook and iris scissors, the flaps were gently and sharply undermined and freed up. Following this, the designed flaps were placed into the primary defect and sutured into place.

## 2025-05-28 NOTE — ED TRIAGE NOTES
Patient was not to check her INR until later this week, but she is trending rather high for only 3 days = 12.5 mg taken since Friday.  (checks them on home monitor).  She was to start 2.5 and 5mg EOD.  (see 4/17/20 encounter)    Will route to Dr. Logan for review.    Pt reports progressed pain today she tried to get into her infusion center and they were closed yesterday.  Pain originally began last Tuesday over a week ago and has been doing all the things at home to regulate her pain but her home meds aren't helping.  Current pain is everywhere, no one spot more than the other.  Denies chest pain dyspnea or fevers. Current pain is 10/10.

## 2025-05-28 NOTE — TELEPHONE ENCOUNTER
Oncology Nurse Triage - Sickle Cell Pain Crisis:     Situation: Jennifer  calling about Sickle Cell Pain Crisis, requesting to be added on for IV fluids and pain medicine     Background:   Patient's last infusion was 05/24/25 ED; 5/23/25 in infusion   Last clinic visit date: 05/23/25 w/ Patricia Mantilla  Does patient have active treatment plan? Yes     Assessment of Symptoms:  Onset/Duration of symptoms: 3 days     Is it typical sickle cell pain? Yes  Location: all over  Character: Dull ache  Intensity: 10/10     Any radiation of pain, numbness, tingling, weakness, warmth, swelling, discoloration of arms or legs? No     Fever? No     Chest Pain Present: No     Shortness of breath: No     Other home therapies tried: HEAT/HEATING PAD and WARM BATH      Last home medication taken and when: 10 pm oxycodone     Any Refills Needed?: No     Does patient have transportation & length of time to get to clinic: yes, pt is on her way to clinic now for other appts. Will be at [facility] until ~10am today.     Recommendations:   Added to infusion wait list.

## 2025-05-29 ENCOUNTER — NURSE TRIAGE (OUTPATIENT)
Dept: ONCOLOGY | Facility: CLINIC | Age: 25
End: 2025-05-29
Payer: MEDICAID

## 2025-05-29 NOTE — TELEPHONE ENCOUNTER
[facility] has an opening today at 1130 but would only be able to do two doses.    Call placed to pt to offer appt. Pt declined appt and stated she would like to remain on the wait list to get her 3 doses.

## 2025-05-29 NOTE — TELEPHONE ENCOUNTER
Pt called to see where is at on infusion list. Informed infusion has not been able to take any add ons today, reassured triage will call if any openings come up before 2pm. Advised ED if symptoms worsen. Pt voiced understanding.

## 2025-05-29 NOTE — TELEPHONE ENCOUNTER
"Oncology Nurse Triage - Sickle Cell Pain Crisis:    Situation: Jennifer  calling about Sickle Cell Pain Crisis, requesting to be added on for IV fluids and pain medicine    Background:     Patient's last infusion was 05/29/25 ED  Last clinic visit date: 05/23/25 w/ Patricia Mantilla  Does patient have active treatment plan? Yes    Assessment of Symptoms:  Onset/Duration of symptoms: 4 day    Is it typical sickle cell pain? Yes  Location: \"all over\"  Character: Sharp  Intensity: 10/10    Any radiation of pain, numbness, tingling, weakness, warmth, swelling, discoloration of arms or legs?No    Fever? No    Chest Pain Present: No    Shortness of breath: No    Other home therapies tried: HEAT/HEATING PAD and WARM BATH     Last home medication taken and when: 10pm oxycodone    Any Refills Needed?: No    Does patient have transportation & length of time to get to clinic: Pt states if appt before 11am she can find her own transportation, otherwise will need assistance.      Recommendations:     [id number] Secure message to Patricia Mantilla for approval  [id number] Approved by Patricia Mantilla    If you do not hear from the infusion center by 2pm then you will not be able to get in for an infusion today. If symptoms worsen while waiting for call back, and/or you experience fever, chills, SOB, chest pain, cough, n/v, dizziness, numbness, swelling, discoloration of extremities, then seek emergency evaluation in Emergency Department.             "

## 2025-05-30 ENCOUNTER — HOSPITAL ENCOUNTER (EMERGENCY)
Facility: CLINIC | Age: 25
Discharge: HOME | End: 2025-05-30
Attending: STUDENT IN AN ORGANIZED HEALTH CARE EDUCATION/TRAINING PROGRAM | Admitting: STUDENT IN AN ORGANIZED HEALTH CARE EDUCATION/TRAINING PROGRAM
Payer: MEDICAID

## 2025-05-30 ENCOUNTER — APPOINTMENT (OUTPATIENT)
Dept: GENERAL RADIOLOGY | Facility: CLINIC | Age: 25
End: 2025-05-30
Attending: STUDENT IN AN ORGANIZED HEALTH CARE EDUCATION/TRAINING PROGRAM
Payer: MEDICAID

## 2025-05-30 VITALS
SYSTOLIC BLOOD PRESSURE: 128 MMHG | OXYGEN SATURATION: 95 % | TEMPERATURE: 98.2 F | BODY MASS INDEX: 26.46 KG/M2 | DIASTOLIC BLOOD PRESSURE: 81 MMHG | HEART RATE: 92 BPM | WEIGHT: 155 LBS | HEIGHT: 64 IN | RESPIRATION RATE: 18 BRPM

## 2025-05-30 DIAGNOSIS — D57.00 SICKLE CELL DISEASE WITH CRISIS (H): ICD-10-CM

## 2025-05-30 LAB
ALBUMIN SERPL BCG-MCNC: 4.8 G/DL (ref 3.5–5.2)
ALP SERPL-CCNC: 63 U/L (ref 40–150)
ALT SERPL W P-5'-P-CCNC: 38 U/L (ref 0–50)
ANION GAP SERPL CALCULATED.3IONS-SCNC: 14 MMOL/L (ref 7–15)
AST SERPL W P-5'-P-CCNC: 68 U/L (ref 0–45)
BASOPHILS # BLD AUTO: 0.2 10E3/UL (ref 0–0.2)
BASOPHILS NFR BLD AUTO: 2 %
BILIRUB SERPL-MCNC: 4.1 MG/DL
BUN SERPL-MCNC: 7 MG/DL (ref 6–20)
CALCIUM SERPL-MCNC: 9 MG/DL (ref 8.8–10.4)
CHLORIDE SERPL-SCNC: 100 MMOL/L (ref 98–107)
CREAT SERPL-MCNC: 0.51 MG/DL (ref 0.51–0.95)
EGFRCR SERPLBLD CKD-EPI 2021: >90 ML/MIN/1.73M2
EOSINOPHIL # BLD AUTO: 0.3 10E3/UL (ref 0–0.7)
EOSINOPHIL NFR BLD AUTO: 2 %
ERYTHROCYTE [DISTWIDTH] IN BLOOD BY AUTOMATED COUNT: 25.1 % (ref 10–15)
GLUCOSE SERPL-MCNC: 85 MG/DL (ref 70–99)
HCG SERPL QL: NEGATIVE
HCO3 SERPL-SCNC: 22 MMOL/L (ref 22–29)
HCT VFR BLD AUTO: 19.5 % (ref 35–47)
HGB BLD-MCNC: 7.1 G/DL (ref 11.7–15.7)
HOLD SPECIMEN: NORMAL
HOLD SPECIMEN: NORMAL
IMM GRANULOCYTES # BLD: 0.1 10E3/UL
IMM GRANULOCYTES NFR BLD: 1 %
LIPASE SERPL-CCNC: 30 U/L (ref 13–60)
LYMPHOCYTES # BLD AUTO: 2.3 10E3/UL (ref 0.8–5.3)
LYMPHOCYTES NFR BLD AUTO: 18 %
MCH RBC QN AUTO: 30.7 PG (ref 26.5–33)
MCHC RBC AUTO-ENTMCNC: 36.4 G/DL (ref 31.5–36.5)
MCV RBC AUTO: 84 FL (ref 78–100)
MONOCYTES # BLD AUTO: 1.3 10E3/UL (ref 0–1.3)
MONOCYTES NFR BLD AUTO: 10 %
NEUTROPHILS # BLD AUTO: 8.8 10E3/UL (ref 1.6–8.3)
NEUTROPHILS NFR BLD AUTO: 68 %
NRBC # BLD AUTO: 0.4 10E3/UL
NRBC BLD AUTO-RTO: 3 /100
PLATELET # BLD AUTO: 460 10E3/UL (ref 150–450)
POTASSIUM SERPL-SCNC: 4.3 MMOL/L (ref 3.4–5.3)
PROT SERPL-MCNC: 8.3 G/DL (ref 6.4–8.3)
RBC # BLD AUTO: 2.31 10E6/UL (ref 3.8–5.2)
SODIUM SERPL-SCNC: 136 MMOL/L (ref 135–145)
WBC # BLD AUTO: 13 10E3/UL (ref 4–11)

## 2025-05-30 PROCEDURE — 96375 TX/PRO/DX INJ NEW DRUG ADDON: CPT | Performed by: STUDENT IN AN ORGANIZED HEALTH CARE EDUCATION/TRAINING PROGRAM

## 2025-05-30 PROCEDURE — 83690 ASSAY OF LIPASE: CPT | Performed by: STUDENT IN AN ORGANIZED HEALTH CARE EDUCATION/TRAINING PROGRAM

## 2025-05-30 PROCEDURE — 258N000003 HC RX IP 258 OP 636: Performed by: STUDENT IN AN ORGANIZED HEALTH CARE EDUCATION/TRAINING PROGRAM

## 2025-05-30 PROCEDURE — 96374 THER/PROPH/DIAG INJ IV PUSH: CPT | Performed by: STUDENT IN AN ORGANIZED HEALTH CARE EDUCATION/TRAINING PROGRAM

## 2025-05-30 PROCEDURE — 84703 CHORIONIC GONADOTROPIN ASSAY: CPT | Performed by: STUDENT IN AN ORGANIZED HEALTH CARE EDUCATION/TRAINING PROGRAM

## 2025-05-30 PROCEDURE — 82040 ASSAY OF SERUM ALBUMIN: CPT | Performed by: STUDENT IN AN ORGANIZED HEALTH CARE EDUCATION/TRAINING PROGRAM

## 2025-05-30 PROCEDURE — 36415 COLL VENOUS BLD VENIPUNCTURE: CPT | Performed by: STUDENT IN AN ORGANIZED HEALTH CARE EDUCATION/TRAINING PROGRAM

## 2025-05-30 PROCEDURE — 99285 EMERGENCY DEPT VISIT HI MDM: CPT | Performed by: STUDENT IN AN ORGANIZED HEALTH CARE EDUCATION/TRAINING PROGRAM

## 2025-05-30 PROCEDURE — 96361 HYDRATE IV INFUSION ADD-ON: CPT | Performed by: STUDENT IN AN ORGANIZED HEALTH CARE EDUCATION/TRAINING PROGRAM

## 2025-05-30 PROCEDURE — 85004 AUTOMATED DIFF WBC COUNT: CPT | Performed by: STUDENT IN AN ORGANIZED HEALTH CARE EDUCATION/TRAINING PROGRAM

## 2025-05-30 PROCEDURE — 250N000011 HC RX IP 250 OP 636: Mod: JZ | Performed by: EMERGENCY MEDICINE

## 2025-05-30 PROCEDURE — 96376 TX/PRO/DX INJ SAME DRUG ADON: CPT | Performed by: STUDENT IN AN ORGANIZED HEALTH CARE EDUCATION/TRAINING PROGRAM

## 2025-05-30 PROCEDURE — 250N000011 HC RX IP 250 OP 636: Mod: JZ | Performed by: STUDENT IN AN ORGANIZED HEALTH CARE EDUCATION/TRAINING PROGRAM

## 2025-05-30 PROCEDURE — 71046 X-RAY EXAM CHEST 2 VIEWS: CPT

## 2025-05-30 PROCEDURE — 99284 EMERGENCY DEPT VISIT MOD MDM: CPT | Mod: 25 | Performed by: STUDENT IN AN ORGANIZED HEALTH CARE EDUCATION/TRAINING PROGRAM

## 2025-05-30 RX ORDER — KETOROLAC TROMETHAMINE 30 MG/ML
30 INJECTION, SOLUTION INTRAMUSCULAR; INTRAVENOUS ONCE
Status: COMPLETED | OUTPATIENT
Start: 2025-05-30 | End: 2025-05-30

## 2025-05-30 RX ORDER — ONDANSETRON 2 MG/ML
8 INJECTION INTRAMUSCULAR; INTRAVENOUS ONCE
Status: COMPLETED | OUTPATIENT
Start: 2025-05-30 | End: 2025-05-30

## 2025-05-30 RX ORDER — ONDANSETRON 2 MG/ML
INJECTION INTRAMUSCULAR; INTRAVENOUS
Status: DISCONTINUED
Start: 2025-05-30 | End: 2025-05-30 | Stop reason: HOSPADM

## 2025-05-30 RX ADMIN — HYDROMORPHONE HYDROCHLORIDE 1 MG: 1 INJECTION, SOLUTION INTRAMUSCULAR; INTRAVENOUS; SUBCUTANEOUS at 16:04

## 2025-05-30 RX ADMIN — HYDROMORPHONE HYDROCHLORIDE 1 MG: 1 INJECTION, SOLUTION INTRAMUSCULAR; INTRAVENOUS; SUBCUTANEOUS at 15:01

## 2025-05-30 RX ADMIN — KETOROLAC TROMETHAMINE 30 MG: 30 INJECTION, SOLUTION INTRAMUSCULAR at 15:00

## 2025-05-30 RX ADMIN — ONDANSETRON 8 MG: 2 INJECTION INTRAMUSCULAR; INTRAVENOUS at 15:01

## 2025-05-30 RX ADMIN — SODIUM CHLORIDE, SODIUM LACTATE, POTASSIUM CHLORIDE, AND CALCIUM CHLORIDE 1000 ML: .6; .31; .03; .02 INJECTION, SOLUTION INTRAVENOUS at 15:01

## 2025-05-30 RX ADMIN — HYDROMORPHONE HYDROCHLORIDE 1 MG: 1 INJECTION, SOLUTION INTRAMUSCULAR; INTRAVENOUS; SUBCUTANEOUS at 17:06

## 2025-05-30 RX ADMIN — HYDROMORPHONE HYDROCHLORIDE 1 MG: 1 INJECTION, SOLUTION INTRAMUSCULAR; INTRAVENOUS; SUBCUTANEOUS at 18:18

## 2025-05-30 ASSESSMENT — ACTIVITIES OF DAILY LIVING (ADL)
ADLS_ACUITY_SCORE: 58

## 2025-05-30 NOTE — ED TRIAGE NOTES
Patient states she goes to the infusion center 2 times a week and they were not able to get her in. States she has been in pain the last couple of days. 10/10. Crying in triage.

## 2025-05-30 NOTE — ED PROVIDER NOTES
[facility] EMERGENCY DEPARTMENT ([facility])    5/30/25      ED PROVIDER NOTE   Vertical Triage B  History     Chief Complaint   Patient presents with    Sickle Cell Pain Crisis     HPI  Jennifer Cervantes is a 25 year old female with history of sickle cell disease who presents emergency department with sickle cell pain flare for the past few days.  She has twice weekly infusion center visits for pain control, tried to get into the infusion clinic to help manage her pain but they were full.  She now presents to the emergency department.  Her symptoms today are similar to previous pain flares.  At home she is on oxycodone 15 mg every 4-6 hours for breakthrough pain.     Her pain is primarily on the left side of her body, in her left arm, left back and left leg.  She is not having any new numbness, weakness or tingling.  No hypoxia, shortness of breath although was found to be borderline hypoxic at 91% on arrival, and was placed on oxygen.  Denies any cough, no chest pain or shortness of breath.  No fevers or chills.      ER ACUTE PAIN CRISIS TREATMENT: (LIMITING IV DOSING)  Dilaudid 1 mg IV q1h up to 3 doses  Toradol 30 mg IV x1   Maintenance IV fluids with LR  Other: Zofran 8 mg IV PRN nausea    Past Medical History  Past Medical History:   Diagnosis Date    Anxiety     Bleeding disorder     Blood clotting disorder     Cerebral infarction (H) 2015    Cognitive developmental delay     low IQ. 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 side (H)     right hand contractures    Iron overload due to repeated red blood cell transfusions     Migraines     Multiple subsegmental pulmonary emboli without acute cor pulmonale (H) 02/01/2021    Oppositional defiant behavior     Presence of intrauterine contraceptive device 2/18/2020    Superficial venous thrombosis of arm, right 03/25/2021     Uncomplicated asthma      Past Surgical History:   Procedure Laterality Date    AS INSERT TUNNELED CV 2 CATH W/O PORT/PUMP      CHOLECYSTECTOMY      CV RIGHT HEART CATH MEASUREMENTS RECORDED N/A 11/18/2021    Procedure: Right Heart Cath;  Surgeon: Jackson Stauffer MD;  Location:  HEART CARDIAC CATH LAB    INSERT PORT VASCULAR ACCESS Left 4/21/2021    Procedure: INSERTION, VASCULAR ACCESS PORT (NOT SURE ON SIDE UNTIL REMOVAL);  Surgeon: Rajan More MD;  Location: [facility] OR    IR CHEST PORT PLACEMENT > 5 YRS OF AGE  4/21/2021    IR CVC NON TUNNEL LINE REMOVAL  5/6/2021    IR CVC NON TUNNEL PLACEMENT > 5 YRS  04/07/2020    IR CVC NON TUNNEL PLACEMENT > 5 YRS  4/30/2021    IR CVC NON TUNNEL PLACEMENT > 5 YRS  9/7/2022    IR PORT REMOVAL LEFT  4/21/2021    REMOVE PORT VASCULAR ACCESS Left 4/21/2021    Procedure: REMOVAL, VASCULAR ACCESS PORT LEFT;  Surgeon: Rajan More MD;  Location: [facility] OR    REPAIR TENDON ELBOW Right 10/02/2019    Procedure: Right Elbow Flexor Lengthening, Flexor Pronator Slide Of Wrist and Finger, Thumb Adductor Lengthening;  Surgeon: Anai Franco MD;  Location: [facility] OR    TONSILLECTOMY Bilateral 10/02/2019    Procedure: Bilateral Tonsillectomy;  Surgeon: Farhana Guy MD;  Location: [facility] OR    ZZC BREAST REDUCTION (INCLUDES LIPO) TIER 3 Bilateral 04/23/2019     albuterol (PROVENTIL) (2.5 MG/3ML) 0.083% neb solution  aspirin (ASA) 81 MG chewable tablet  azelastine (ASTELIN) 0.1 % nasal spray  budesonide-formoterol (SYMBICORT/BREYNA) 160-4.5 MCG/ACT Inhaler  deferasirox (JADENU) 360 MG tablet  Deferiprone, Twice Daily, 1000 MG TABS  diclofenac (VOLTAREN) 1 % topical gel  diphenhydrAMINE (BENADRYL) 50 MG capsule  EPINEPHrine (ANY BX GENERIC EQUIV) 0.3 MG/0.3ML injection 2-pack  hydroxyurea (HYDREA) 500 MG capsule  ibuprofen (ADVIL/MOTRIN) 800 MG tablet  ketotifen fumarate 0.035% 0.035 % SOLN ophthalmic solution  methocarbamol (ROBAXIN) 750 MG tablet  methylPREDNISolone  "(MEDROL) 32 MG tablet  naloxone (NARCAN) 4 MG/0.1ML nasal spray  naproxen (NAPROSYN) 500 MG tablet  oxyCODONE IR (ROXICODONE) 10 MG tablet  pantoprazole (PROTONIX) 40 MG EC tablet  sertraline (ZOLOFT) 50 MG tablet      Allergies   Allergen Reactions    Contrast Dye Angioedema     Hives and breathing issues    Fish-Derived Products Hives    Seafood Hives    Adhesive Tape Hives     Primipore dressing causes hives    Gadolinium     Iodinated Contrast Media      Family History  Family History   Problem Relation Age of Onset    Sickle Cell Trait Mother     Hypertension Mother     Asthma Mother     Sickle Cell Trait Father     Glaucoma No family hx of     Macular Degeneration No family hx of     Diabetes No family hx of     Gout No family hx of      Social History   Social History     Tobacco Use    Smoking status: Never     Passive exposure: Never    Smokeless tobacco: Never   Substance Use Topics    Alcohol use: Not Currently     Alcohol/week: 0.0 standard drinks of alcohol    Drug use: Never      A medically appropriate review of systems was performed with pertinent positives and negatives noted in the HPI, and all other systems negative.    Physical Exam   BP: 138/84  Pulse: 90  Temp: 99.1  F (37.3  C)  Resp: 16  Height: 162.6 cm (5' 4\")  Weight: 70.3 kg (155 lb)  SpO2: (!) 91 %    Physical Exam  GEN: Nontoxic, cooperative  HEENT: normocephalic and atraumatic, PERRLA, EOMI  CV: well-perfused, normal skin color for ethnicity, regular rate and rhythm, no murmurs rubs or gallops  PULM: breathing comfortably, in no respiratory distress, clear to auscultation upper and lower lung fields  ABD: nondistended, soft, nontender, negative Viveros, negative Fidel point tenderness  EXT: Full range of motion.  No edema.  NEURO: awake, conversant, grossly normal bilateral upper and lower extremity strength & ROM, baseline right upper extremity weakness  SKIN: No rashes, ecchymosis, or lacerations  PSYCH: Calm and cooperative, " interactive     ED Course, Procedures, & Data      Procedures        Results for orders placed or performed during the hospital encounter of 05/30/25   [place] Draw     Status: None (In process)    Narrative    The following orders were created for panel order [place] Draw.  Procedure                               Abnormality         Status                     ---------                               -----------         ------                     Extra Green Top (Lithiu...[[id number]]                      In process                 Extra Purple Top Tube[[id number]]                           In process                   Please view results for these tests on the individual orders.   Comprehensive metabolic panel     Status: Abnormal   Result Value Ref Range    Sodium 136 135 - 145 mmol/L    Potassium 4.3 3.4 - 5.3 mmol/L    Carbon Dioxide (CO2) 22 22 - 29 mmol/L    Anion Gap 14 7 - 15 mmol/L    Urea Nitrogen 7.0 6.0 - 20.0 mg/dL    Creatinine 0.51 0.51 - 0.95 mg/dL    GFR Estimate >90 >60 mL/min/1.73m2    Calcium 9.0 8.8 - 10.4 mg/dL    Chloride 100 98 - 107 mmol/L    Glucose 85 70 - 99 mg/dL    Alkaline Phosphatase 63 40 - 150 U/L    AST 68 (H) 0 - 45 U/L    ALT 38 0 - 50 U/L    Protein Total 8.3 6.4 - 8.3 g/dL    Albumin 4.8 3.5 - 5.2 g/dL    Bilirubin Total 4.1 (H) <=1.2 mg/dL   Lipase     Status: Normal   Result Value Ref Range    Lipase 30 13 - 60 U/L   HCG qualitative Blood     Status: Normal   Result Value Ref Range    hCG Serum Qualitative Negative Negative   CBC with platelets and differential     Status: Abnormal   Result Value Ref Range    WBC Count 13.0 (H) 4.0 - 11.0 10e3/uL    RBC Count 2.31 (L) 3.80 - 5.20 10e6/uL    Hemoglobin 7.1 (L) 11.7 - 15.7 g/dL    Hematocrit 19.5 (L) 35.0 - 47.0 %    MCV 84 78 - 100 fL    MCH 30.7 26.5 - 33.0 pg    MCHC 36.4 31.5 - 36.5 g/dL    RDW 25.1 (H) 10.0 - 15.0 %    Platelet Count 460 (H) 150 - 450 10e3/uL    % Neutrophils 68 %    % Lymphocytes 18 %    % Monocytes 10 %     % Eosinophils 2 %    % Basophils 2 %    % Immature Granulocytes 1 %    NRBCs per 100 WBC 3 (H) <1 /100    Absolute Neutrophils 8.8 (H) 1.6 - 8.3 10e3/uL    Absolute Lymphocytes 2.3 0.8 - 5.3 10e3/uL    Absolute Monocytes 1.3 0.0 - 1.3 10e3/uL    Absolute Eosinophils 0.3 0.0 - 0.7 10e3/uL    Absolute Basophils 0.2 0.0 - 0.2 10e3/uL    Absolute Immature Granulocytes 0.1 <=0.4 10e3/uL    Absolute NRBCs 0.4 10e3/uL   CBC with platelets differential     Status: Abnormal    Narrative    The following orders were created for panel order CBC with platelets differential.  Procedure                               Abnormality         Status                     ---------                               -----------         ------                     CBC with platelets and ...[[id number]]  Abnormal            Final result                 Please view results for these tests on the individual orders.     Medications   HYDROmorphone (DILAUDID) injection 1 mg (1 mg Intravenous $Given 5/30/25 1501)   lactated ringers BOLUS 1,000 mL (1,000 mLs Intravenous $New Bag 5/30/25 1501)   ondansetron (ZOFRAN) 2 MG/ML injection (has no administration in time range)   ketorolac (TORADOL) injection 30 mg (30 mg Intravenous $Given 5/30/25 1500)   ondansetron (ZOFRAN) injection 8 mg (8 mg Intravenous $Given 5/30/25 1501)     Labs Ordered and Resulted from Time of ED Arrival to Time of ED Departure   COMPREHENSIVE METABOLIC PANEL - Abnormal       Result Value    Sodium 136      Potassium 4.3      Carbon Dioxide (CO2) 22      Anion Gap 14      Urea Nitrogen 7.0      Creatinine 0.51      GFR Estimate >90      Calcium 9.0      Chloride 100      Glucose 85      Alkaline Phosphatase 63      AST 68 (*)     ALT 38      Protein Total 8.3      Albumin 4.8      Bilirubin Total 4.1 (*)    CBC WITH PLATELETS AND DIFFERENTIAL - Abnormal    WBC Count 13.0 (*)     RBC Count 2.31 (*)     Hemoglobin 7.1 (*)     Hematocrit 19.5 (*)     MCV 84      MCH 30.7      MCHC  36.4      RDW 25.1 (*)     Platelet Count 460 (*)     % Neutrophils 68      % Lymphocytes 18      % Monocytes 10      % Eosinophils 2      % Basophils 2      % Immature Granulocytes 1      NRBCs per 100 WBC 3 (*)     Absolute Neutrophils 8.8 (*)     Absolute Lymphocytes 2.3      Absolute Monocytes 1.3      Absolute Eosinophils 0.3      Absolute Basophils 0.2      Absolute Immature Granulocytes 0.1      Absolute NRBCs 0.4     LIPASE - Normal    Lipase 30     HCG QUALITATIVE PREGNANCY - Normal    hCG Serum Qualitative Negative       XR Chest 2 Views    (Results Pending)          Critical care was not performed.     Medical Decision Making  The patient's presentation was of high complexity (a chronic illness severe exacerbation, progression, or side effect of treatment).    The patient's evaluation involved:  review of external note(s) from 3+ sources (see separate area of note for details)  review of 3+ test result(s) ordered prior to this encounter (see separate area of note for details)  ordering and/or review of 3+ test(s) in this encounter (see separate area of note for details)    The patient's management necessitated high risk (a parenteral controlled substance) and high risk (a decision regarding hospitalization).    Assessment & Plan    Jennifer Cervantes is a 25 year old female with history of sickle cell disease who presents emergency department with sickle cell pain flare for the past few days initially noted to be hypoxic on room air to 91%, placed on oxygen, however endorsing pain is primarily in her left shoulder, and leg.    Concern for sickle cell crisis.  Considered the possibility of acute chest although she is not having any shortness of breath or chest pain.  On my evaluation of the patient about 30 minutes after her initial arrival, I was able to switch her finger pulse ox to a different hand, and was able to take her off of oxygen without any further hypoxia.  Unclear whether or not this was true  hypoxia.  Will plan to monitor, and give her pain medication regimen as planned.  No evidence of lower extremity swelling, low suspicion of DVT or PE.  Considered ACS, however no chest pain.  Considered acute chest, pending chest x-ray.    3:25 PM labs overall reassuring and at patient's baseline.  Pending x-ray, remainder of her pain care plan to be given.  Will sign out to the oncoming team pending remainder of her pain medication, ongoing evaluation regarding the initial hypoxia, and ultimate disposition planning    I have reviewed the nursing notes. I have reviewed the findings, diagnosis, plan and need for follow up with the patient.    New Prescriptions    No medications on file       Final diagnoses:   Sickle cell disease with crisis (H)     I, Bambi Tejeda, am serving as a trained medical scribe to document services personally performed by Lisa Coombs MD based on the provider's statements to me on May 30, 2025.  This document has been checked and approved by the attending provider.    I, Lisa Coombs MD, was physically present and have reviewed and verified the accuracy of this note documented by Bambi Tejeda, medical scribe.      Lisa Coombs MD   [facility] EMERGENCY DEPARTMENT  5/30/2025        Lisa Coombs MD  05/30/25 1524       Lisa Coombs MD  05/30/25 7268

## 2025-05-31 NOTE — ED PROVIDER NOTES
This is a 25-year-old female with history of sickle cell disease who is signed out to me.  She has presented with concern for sickle cell pain crisis.  No associated chest pain or shortness of breath.  She was signed out pending completion of her treatment plan as well as x-ray.  X-ray did come back negative for acute findings.    Results were discussed with the patient and she got pain medications per her care plan.  She discussed with the patient admission versus discharge.  She felt that she did want to go home and that with 1 additional dose she would be able to rather than be admitted.  It is reasonable to try this and avoid unnecessary admission.  She did receive a 1 more dose of Dilaudid and then on reevaluation reported feeling significantly improved.  Continued to deny shortness of breath, chest pain.  She was discharged in stable condition.  Return precautions were discussed and all questions answered.     Suraj Pino MD  05/30/25 0744

## 2025-06-02 ENCOUNTER — INFUSION THERAPY VISIT (OUTPATIENT)
Dept: TRANSPLANT | Facility: CLINIC | Age: 25
End: 2025-06-02
Attending: PEDIATRICS
Payer: MEDICAID

## 2025-06-02 ENCOUNTER — PATIENT OUTREACH (OUTPATIENT)
Dept: CARE COORDINATION | Facility: CLINIC | Age: 25
End: 2025-06-02
Payer: MEDICAID

## 2025-06-02 ENCOUNTER — NURSE TRIAGE (OUTPATIENT)
Dept: ONCOLOGY | Facility: CLINIC | Age: 25
End: 2025-06-02
Payer: MEDICAID

## 2025-06-02 VITALS
OXYGEN SATURATION: 95 % | HEART RATE: 89 BPM | TEMPERATURE: 98.2 F | RESPIRATION RATE: 20 BRPM | DIASTOLIC BLOOD PRESSURE: 81 MMHG | SYSTOLIC BLOOD PRESSURE: 125 MMHG

## 2025-06-02 DIAGNOSIS — D57.00 SICKLE CELL DISEASE WITH CRISIS (H): ICD-10-CM

## 2025-06-02 DIAGNOSIS — D57.00 SICKLE CELL PAIN CRISIS (H): Primary | ICD-10-CM

## 2025-06-02 DIAGNOSIS — G81.10 SPASTIC HEMIPLEGIA, UNSPECIFIED ETIOLOGY, UNSPECIFIED LATERALITY (H): ICD-10-CM

## 2025-06-02 PROCEDURE — 96375 TX/PRO/DX INJ NEW DRUG ADDON: CPT

## 2025-06-02 PROCEDURE — 258N000003 HC RX IP 258 OP 636: Performed by: PEDIATRICS

## 2025-06-02 PROCEDURE — 96374 THER/PROPH/DIAG INJ IV PUSH: CPT

## 2025-06-02 PROCEDURE — 250N000011 HC RX IP 250 OP 636: Performed by: PEDIATRICS

## 2025-06-02 PROCEDURE — 96376 TX/PRO/DX INJ SAME DRUG ADON: CPT

## 2025-06-02 PROCEDURE — 96361 HYDRATE IV INFUSION ADD-ON: CPT

## 2025-06-02 PROCEDURE — 250N000013 HC RX MED GY IP 250 OP 250 PS 637: Performed by: PEDIATRICS

## 2025-06-02 RX ORDER — DIPHENHYDRAMINE HCL 25 MG
50 CAPSULE ORAL
Status: COMPLETED | OUTPATIENT
Start: 2025-06-02 | End: 2025-06-02

## 2025-06-02 RX ORDER — ONDANSETRON 4 MG/1
8 TABLET, FILM COATED ORAL
Start: 2025-08-01

## 2025-06-02 RX ORDER — HEPARIN SODIUM,PORCINE 10 UNIT/ML
5 VIAL (ML) INTRAVENOUS
OUTPATIENT
Start: 2025-08-01

## 2025-06-02 RX ORDER — OXYCODONE HYDROCHLORIDE 10 MG/1
10 TABLET ORAL EVERY 6 HOURS PRN
Qty: 12 TABLET | Refills: 0 | Status: SHIPPED | OUTPATIENT
Start: 2025-06-02

## 2025-06-02 RX ORDER — KETOROLAC TROMETHAMINE 30 MG/ML
30 INJECTION, SOLUTION INTRAMUSCULAR; INTRAVENOUS ONCE
Status: COMPLETED | OUTPATIENT
Start: 2025-06-02 | End: 2025-06-02

## 2025-06-02 RX ORDER — DIPHENHYDRAMINE HCL 25 MG
50 CAPSULE ORAL
Status: CANCELLED
Start: 2025-08-01

## 2025-06-02 RX ORDER — ONDANSETRON 2 MG/ML
8 INJECTION INTRAMUSCULAR; INTRAVENOUS EVERY 6 HOURS PRN
Status: CANCELLED
Start: 2025-08-01

## 2025-06-02 RX ORDER — ONDANSETRON 2 MG/ML
8 INJECTION INTRAMUSCULAR; INTRAVENOUS EVERY 6 HOURS PRN
Status: DISCONTINUED | OUTPATIENT
Start: 2025-06-02 | End: 2025-06-02 | Stop reason: HOSPADM

## 2025-06-02 RX ORDER — KETOROLAC TROMETHAMINE 30 MG/ML
30 INJECTION, SOLUTION INTRAMUSCULAR; INTRAVENOUS ONCE
Status: CANCELLED
Start: 2025-08-01 | End: 2025-08-01

## 2025-06-02 RX ORDER — HEPARIN SODIUM (PORCINE) LOCK FLUSH IV SOLN 100 UNIT/ML 100 UNIT/ML
5 SOLUTION INTRAVENOUS
OUTPATIENT
Start: 2025-08-01

## 2025-06-02 RX ADMIN — HYDROMORPHONE HYDROCHLORIDE 1 MG: 1 INJECTION, SOLUTION INTRAMUSCULAR; INTRAVENOUS; SUBCUTANEOUS at 13:59

## 2025-06-02 RX ADMIN — KETOROLAC TROMETHAMINE 30 MG: 30 INJECTION, SOLUTION INTRAMUSCULAR; INTRAVENOUS at 13:10

## 2025-06-02 RX ADMIN — DIPHENHYDRAMINE HYDROCHLORIDE 50 MG: 25 CAPSULE ORAL at 13:14

## 2025-06-02 RX ADMIN — HYDROMORPHONE HYDROCHLORIDE 1 MG: 1 INJECTION, SOLUTION INTRAMUSCULAR; INTRAVENOUS; SUBCUTANEOUS at 13:02

## 2025-06-02 RX ADMIN — ONDANSETRON 8 MG: 2 INJECTION INTRAMUSCULAR; INTRAVENOUS at 13:09

## 2025-06-02 RX ADMIN — SODIUM CHLORIDE, SODIUM LACTATE, POTASSIUM CHLORIDE, AND CALCIUM CHLORIDE 1000 ML: .6; .31; .03; .02 INJECTION, SOLUTION INTRAVENOUS at 12:59

## 2025-06-02 RX ADMIN — HYDROMORPHONE HYDROCHLORIDE 1 MG: 1 INJECTION, SOLUTION INTRAMUSCULAR; INTRAVENOUS; SUBCUTANEOUS at 14:57

## 2025-06-02 ASSESSMENT — PAIN SCALES - GENERAL: PAINLEVEL_OUTOF10: SEVERE PAIN (8)

## 2025-06-02 NOTE — PROGRESS NOTES
Infusion Nursing Note:  Jennifer Cervantes presents today for pain meds and IVF.    Patient seen by provider today: No   present during visit today: Not Applicable.    Note: VSS, pt states generalized pain is 8/10. Pt would like it down to 4/10 if possible. PIV placed, 1L LR infused over 2hrs. Pt received PRN IV zofran, po benadryl and IV toradol. 1mg dilaudid administered Q1hr for a total of 3 doses. Pt tolerated interventions. States pain is at an acceptable level of 4/10.      Intravenous Access:  Peripheral IV placed.    Treatment Conditions:  Not Applicable.      Post Infusion Assessment:  Patient tolerated infusion without incident.  Access discontinued per protocol.       Discharge Plan:   Patient discharged in stable condition accompanied by: self.  Departure Mode: Ambulatory.      Yao Cordero RN

## 2025-06-02 NOTE — TELEPHONE ENCOUNTER
Oncology Nurse Triage - Sickle Cell Pain Crisis:    Situation: Jennifer  calling about Sickle Cell Pain Crisis, requesting to be added on for IV fluids and pain medicine    Background:     Patient's last infusion was 5/30/2025  Last clinic visit date:5/23/2025  Does patient have active treatment plan? Yes    Assessment of Symptoms:  Onset/Duration of symptoms: 5 day    Is it typical sickle cell pain? Yes  Location: all over  Character: Sharp  Intensity: 9/10    Any radiation of pain, numbness, tingling, weakness, warmth, swelling, discoloration of arms or legs?No    Fever? No  Chest Pain Present: No  Shortness of breath: No    Other home therapies tried: HEAT/HEATING PAD     Last home medication taken and when: last oxycodone taken last night 10:00pm    Any Refills Needed?: Yes for tomorrow Oxycodone FV 90Maxime elaine    Does patient have transportation & length of time to get to clinic: No  Could get a ride into clinic last minute but would need help if appt is a few hours away.     Recommendations:   Meets protocol for new week.     0728 Offer for 12:30pm with BMT for IVF/Pain, pt in agreement with plan. Does need transportation.     0730 scheduling request sent to add on appt.    0739  request sent to assist with round trip transportation for today's appt.     Please note, if you are late for your appt, you risk losing your infusion appt as it may delay another patient's infusion who arrived on time.

## 2025-06-02 NOTE — PROGRESS NOTES
Ambulatory Care Management- Transportation Support  Specialty SW - [facility]     Data/Intervention:  Patient Name:    Jennifer Cervantes     /Age: 2000 (25 year old)     CCRC team member assisting Specialty SW with transportation request.      Assessment:  CHW/CTA booked online with [employer] to arrange medical appointment transportation in conjunction with patient's insurance.  Your reference number is [id number] Booking # is [id number]     Taxi company: [employer]    Patient will need to call above taxi company at phone number: [phone] when ready for return ride home.      Plan:  Patient is aware of the transportation plan.  available to assist with any other needs.      Maritza Vick MA

## 2025-06-02 NOTE — TELEPHONE ENCOUNTER
Narcotic Refill Request    Medication(s) requested:  oxycodone  Person Requesting Refill:Jennifer  What pain is the medication treating: sickle cell pain  How is the medication being taken?:goal 4 tablets per day  Does pt have enough for today? Yes, needs for Tuesday  Is pain being adequately controlled on the current regimen?: okay, requires intermittent IVF/Pain up to 3xweekly  Experiencing any side effects from medication?: denies    Date of most recent appointment:  5/23/2025   Any No Show Visits:none recently  Next appointment:   7/18/2025 Patricia Mantilla CNP  Last fill date and by whom:  Patricia Mantilla on 5/27/2025    Reviewed: YES    Send to provider: Patricia Mantilla and RNCC Arely

## 2025-06-04 ENCOUNTER — PATIENT OUTREACH (OUTPATIENT)
Dept: CARE COORDINATION | Facility: CLINIC | Age: 25
End: 2025-06-04
Payer: MEDICAID

## 2025-06-04 ENCOUNTER — INFUSION THERAPY VISIT (OUTPATIENT)
Dept: TRANSPLANT | Facility: CLINIC | Age: 25
End: 2025-06-04
Attending: PEDIATRICS
Payer: MEDICAID

## 2025-06-04 ENCOUNTER — NURSE TRIAGE (OUTPATIENT)
Dept: ONCOLOGY | Facility: CLINIC | Age: 25
End: 2025-06-04
Payer: MEDICAID

## 2025-06-04 VITALS
RESPIRATION RATE: 16 BRPM | OXYGEN SATURATION: 93 % | DIASTOLIC BLOOD PRESSURE: 78 MMHG | HEART RATE: 63 BPM | SYSTOLIC BLOOD PRESSURE: 126 MMHG | TEMPERATURE: 98.2 F

## 2025-06-04 DIAGNOSIS — D57.00 SICKLE CELL PAIN CRISIS (H): Primary | ICD-10-CM

## 2025-06-04 DIAGNOSIS — G81.10 SPASTIC HEMIPLEGIA, UNSPECIFIED ETIOLOGY, UNSPECIFIED LATERALITY (H): ICD-10-CM

## 2025-06-04 PROCEDURE — 96375 TX/PRO/DX INJ NEW DRUG ADDON: CPT

## 2025-06-04 PROCEDURE — 250N000011 HC RX IP 250 OP 636: Performed by: PEDIATRICS

## 2025-06-04 PROCEDURE — 96376 TX/PRO/DX INJ SAME DRUG ADON: CPT

## 2025-06-04 PROCEDURE — 258N000003 HC RX IP 258 OP 636: Performed by: PEDIATRICS

## 2025-06-04 PROCEDURE — 250N000013 HC RX MED GY IP 250 OP 250 PS 637: Performed by: PEDIATRICS

## 2025-06-04 PROCEDURE — 96361 HYDRATE IV INFUSION ADD-ON: CPT

## 2025-06-04 PROCEDURE — 96374 THER/PROPH/DIAG INJ IV PUSH: CPT

## 2025-06-04 RX ORDER — ONDANSETRON 4 MG/1
8 TABLET, FILM COATED ORAL
Start: 2025-08-01

## 2025-06-04 RX ORDER — HEPARIN SODIUM,PORCINE 10 UNIT/ML
5 VIAL (ML) INTRAVENOUS
OUTPATIENT
Start: 2025-08-01

## 2025-06-04 RX ORDER — ONDANSETRON 2 MG/ML
8 INJECTION INTRAMUSCULAR; INTRAVENOUS EVERY 6 HOURS PRN
Start: 2025-08-01

## 2025-06-04 RX ORDER — DIPHENHYDRAMINE HCL 25 MG
50 CAPSULE ORAL
Status: COMPLETED | OUTPATIENT
Start: 2025-06-04 | End: 2025-06-04

## 2025-06-04 RX ORDER — HEPARIN SODIUM (PORCINE) LOCK FLUSH IV SOLN 100 UNIT/ML 100 UNIT/ML
5 SOLUTION INTRAVENOUS
OUTPATIENT
Start: 2025-08-01

## 2025-06-04 RX ORDER — DIPHENHYDRAMINE HCL 25 MG
50 CAPSULE ORAL
Start: 2025-08-01

## 2025-06-04 RX ORDER — KETOROLAC TROMETHAMINE 30 MG/ML
30 INJECTION, SOLUTION INTRAMUSCULAR; INTRAVENOUS ONCE
Status: COMPLETED | OUTPATIENT
Start: 2025-06-04 | End: 2025-06-04

## 2025-06-04 RX ORDER — ONDANSETRON 2 MG/ML
8 INJECTION INTRAMUSCULAR; INTRAVENOUS EVERY 6 HOURS PRN
Status: DISCONTINUED | OUTPATIENT
Start: 2025-06-04 | End: 2025-06-04 | Stop reason: HOSPADM

## 2025-06-04 RX ORDER — KETOROLAC TROMETHAMINE 30 MG/ML
30 INJECTION, SOLUTION INTRAMUSCULAR; INTRAVENOUS ONCE
Start: 2025-08-01 | End: 2025-08-01

## 2025-06-04 RX ADMIN — KETOROLAC TROMETHAMINE 30 MG: 30 INJECTION, SOLUTION INTRAMUSCULAR; INTRAVENOUS at 11:37

## 2025-06-04 RX ADMIN — HYDROMORPHONE HYDROCHLORIDE 1 MG: 1 INJECTION, SOLUTION INTRAMUSCULAR; INTRAVENOUS; SUBCUTANEOUS at 12:33

## 2025-06-04 RX ADMIN — ONDANSETRON 8 MG: 2 INJECTION INTRAMUSCULAR; INTRAVENOUS at 11:37

## 2025-06-04 RX ADMIN — SODIUM CHLORIDE, SODIUM LACTATE, POTASSIUM CHLORIDE, AND CALCIUM CHLORIDE 1000 ML: .6; .31; .03; .02 INJECTION, SOLUTION INTRAVENOUS at 11:34

## 2025-06-04 RX ADMIN — HYDROMORPHONE HYDROCHLORIDE 1 MG: 1 INJECTION, SOLUTION INTRAMUSCULAR; INTRAVENOUS; SUBCUTANEOUS at 11:36

## 2025-06-04 RX ADMIN — HYDROMORPHONE HYDROCHLORIDE 1 MG: 1 INJECTION, SOLUTION INTRAMUSCULAR; INTRAVENOUS; SUBCUTANEOUS at 13:06

## 2025-06-04 RX ADMIN — DIPHENHYDRAMINE HYDROCHLORIDE 50 MG: 25 CAPSULE ORAL at 11:47

## 2025-06-04 NOTE — PROGRESS NOTES
Infusion Nursing Note:  Jennifer Cervantes presents today for add-on infusion.    Patient seen by provider today: No   present during visit today: Not Applicable.    Note: VSS. Pt rates pain 10/10. Pt received 1L LR over 2 hours, 50 mg Benadryl, 30 mg Toradol, 8 mg Zofran and 3 x 1mg Dilaudid.      Intravenous Access:  Implanted Port.    Treatment Conditions:  Not Applicable.      Post Infusion Assessment:  Patient tolerated infusion without incident.  Blood return noted pre and post infusion.  No evidence of extravasations.       Discharge Plan:   Patient discharged in stable condition accompanied by: self.  Departure Mode: Ambulatory.      Reta Hoskins RN

## 2025-06-04 NOTE — PROGRESS NOTES
Ambulatory Care Management- Transportation Support  Specialty SW - [facility]     Data/Intervention:  Patient Name:    Jennifer Cervantes     /Age: 2000 (25 year old)     CCRC team member assisting Specialty SW with transportation request.      Assessment:  CHW/CTA booked online with [employer] to arrange medical appointment transportation in conjunction with patient's insurance.     reference number is [id number] Booking # is [id number]    Taxi company: [employer]    Patient will need to call above taxi company at phone number: [phone] when ready for return ride home.      Plan:  Patient is aware of the transportation plan.  available to assist with any other needs.      Lisandra Kilgore MA  
None known

## 2025-06-04 NOTE — TELEPHONE ENCOUNTER
Oncology Nurse Triage - Sickle Cell Pain Crisis:  Situation: Jennifer  calling about Sickle Cell Pain Crisis, requesting to be added on for IV fluids and pain medicine    Background:   Patient's last infusion was 6/2/25  Last clinic visit date:5/23/25 with Patricia Mantilla CNP  Does patient have active treatment plan? Yes    Assessment of Symptoms:  Onset/Duration of symptoms: since last night    Is it typical sickle cell pain? Yes  Location: lower back  Character: Dull ache  Intensity: 8/10    Any radiation of pain, numbness, tingling, weakness, warmth, swelling, discoloration of arms or legs?No    Fever? No  Chest Pain Present: No  Shortness of breath: No    Other home therapies tried: HEAT/HEATING PAD and WARM BATH   Last home medication taken and when: last night 10 p.m. oxycodone    Any Refills Needed?: No    Does patient have transportation & length of time to get to clinic: Yes, if something opens up early. If something opens up at 11 then needs medical transportation.    Recommendations:   If you do not hear from the infusion center by 2pm then you will not be able to get in for an infusion today. If symptoms worsen while waiting for call back, and/or you experience fever, chills, SOB, chest pain, cough, n/v, dizziness, numbness, swelling, discoloration of extremities, then seek emergency evaluation in Emergency Department.     Pt voiced understanding.  Added to infusion wait list.    0817: Per Allison H: BMT can take pt for infusion at 1030.    0820: Called Jennifer who confirmed she can come to clinic at 1030. Requesting transportation to and from the apt.  Message sent to [facility] to schedule apt.  Message sent to  to assist with transportation.  Updated Infusion Charge Nurse.    Message routed to Care Team.

## 2025-06-04 NOTE — TELEPHONE ENCOUNTER
Oncology Nurse Triage - Sickle Cell Pain Crisis:    Situation: Jennifer  calling about Sickle Cell Pain Crisis    Background:     Patient's last infusion was 07/03/23  Last clinic visit date:06/05/23 wMikael Mantilla  Next follow-up appt on 07/14/23 w/Patricia Mantilla  Does patient have active treatment plan?  Yes     (If pt has been seen in ED or infusion in last 3 days or no showed last clinic visit then does not meet protocol unless specified in pt therapy plan)       Assessment of Symptoms:  Onset/Duration of symptoms: 4 day    Is it typical sickle cell pain? Yes   Location: Both legs  Character: Sharp           Intensity: 10/10    Any radiation of pain, numbness, tingling, weakness, warmth, swelling, discoloration of extremities?No     Fever?No  (if yes max temperature recorded in last 24 hours):      Chest Pain Present: No     Shortness of breath: No     Other home therapies tried: HEAT/HEATING PAD and WARM BATH     Last home medication taken and when: Oxycodone 0600    Any Refills Needed?: No     Does patient have transportation & length of time to get to clinic: No       Grades for reference:       Grade 1 -   o Patient has active treatment plan.   o Patients last scheduled clinic appointment was attended  o Patient has not been seen in infusion or ED in the last 3 days (clarify exclusions in therapy plans)   o Afebrile   o Typical sickle cell pain   o Home medications have been tried   o No other symptoms       Recommendations:   0706 Added to infusion wait list for IVF/pain meds per protocol.      If you do not hear from the infusion center by 2pm then you will not be able to get in for an infusion today. If symptoms worsen while waiting for call back, and/or you experience fever, chills, SOB, chest pain, cough, n/v, dizziness, numbness, swelling, discoloration of extremities, then seek emergency evaluation in Emergency Department.     Please note, if you are late for your appt, you risk losing your infusion appt  as it may delay another patient's infusion who arrived on time.          yes

## 2025-06-06 ENCOUNTER — HOSPITAL ENCOUNTER (EMERGENCY)
Facility: CLINIC | Age: 25
Discharge: HOME | End: 2025-06-06
Attending: EMERGENCY MEDICINE | Admitting: EMERGENCY MEDICINE
Payer: MEDICAID

## 2025-06-06 ENCOUNTER — APPOINTMENT (OUTPATIENT)
Dept: GENERAL RADIOLOGY | Facility: CLINIC | Age: 25
End: 2025-06-06
Attending: EMERGENCY MEDICINE
Payer: MEDICAID

## 2025-06-06 VITALS
SYSTOLIC BLOOD PRESSURE: 113 MMHG | WEIGHT: 155 LBS | OXYGEN SATURATION: 96 % | DIASTOLIC BLOOD PRESSURE: 69 MMHG | TEMPERATURE: 97.8 F | HEIGHT: 65 IN | HEART RATE: 85 BPM | RESPIRATION RATE: 18 BRPM | BODY MASS INDEX: 25.83 KG/M2

## 2025-06-06 DIAGNOSIS — D57.00 SICKLE CELL PAIN CRISIS (H): ICD-10-CM

## 2025-06-06 LAB
ALBUMIN SERPL BCG-MCNC: 4 G/DL (ref 3.5–5.2)
ALP SERPL-CCNC: 53 U/L (ref 40–150)
ALT SERPL W P-5'-P-CCNC: 34 U/L (ref 0–50)
ANION GAP SERPL CALCULATED.3IONS-SCNC: 10 MMOL/L (ref 7–15)
AST SERPL W P-5'-P-CCNC: 46 U/L (ref 0–45)
ATRIAL RATE - MUSE: 84 BPM
BASOPHILS # BLD AUTO: 0.1 10E3/UL (ref 0–0.2)
BASOPHILS NFR BLD AUTO: 1 %
BILIRUB SERPL-MCNC: 2.5 MG/DL
BUN SERPL-MCNC: 6.8 MG/DL (ref 6–20)
CALCIUM SERPL-MCNC: 7.7 MG/DL (ref 8.8–10.4)
CHLORIDE SERPL-SCNC: 104 MMOL/L (ref 98–107)
CREAT SERPL-MCNC: 0.51 MG/DL (ref 0.51–0.95)
DIASTOLIC BLOOD PRESSURE - MUSE: NORMAL MMHG
EGFRCR SERPLBLD CKD-EPI 2021: >90 ML/MIN/1.73M2
EOSINOPHIL # BLD AUTO: 0.4 10E3/UL (ref 0–0.7)
EOSINOPHIL NFR BLD AUTO: 3 %
ERYTHROCYTE [DISTWIDTH] IN BLOOD BY AUTOMATED COUNT: 25.1 % (ref 10–15)
FRAGMENTS BLD QL SMEAR: SLIGHT
GLUCOSE SERPL-MCNC: 100 MG/DL (ref 70–99)
HCG SERPL QL: NEGATIVE
HCO3 SERPL-SCNC: 22 MMOL/L (ref 22–29)
HCT VFR BLD AUTO: 16.1 % (ref 35–47)
HGB BLD-MCNC: 5.9 G/DL (ref 11.7–15.7)
IMM GRANULOCYTES # BLD: 0.1 10E3/UL
IMM GRANULOCYTES NFR BLD: 1 %
INTERPRETATION ECG - MUSE: NORMAL
LYMPHOCYTES # BLD AUTO: 2.4 10E3/UL (ref 0.8–5.3)
LYMPHOCYTES NFR BLD AUTO: 17 %
MCH RBC QN AUTO: 31.6 PG (ref 26.5–33)
MCHC RBC AUTO-ENTMCNC: 36.6 G/DL (ref 31.5–36.5)
MCV RBC AUTO: 86 FL (ref 78–100)
MONOCYTES # BLD AUTO: 1.3 10E3/UL (ref 0–1.3)
MONOCYTES NFR BLD AUTO: 9 %
NEUTROPHILS # BLD AUTO: 9.2 10E3/UL (ref 1.6–8.3)
NEUTROPHILS NFR BLD AUTO: 68 %
NRBC # BLD AUTO: 0.3 10E3/UL
NRBC BLD AUTO-RTO: 2 /100
P AXIS - MUSE: 80 DEGREES
PLAT MORPH BLD: ABNORMAL
PLATELET # BLD AUTO: 401 10E3/UL (ref 150–450)
POLYCHROMASIA BLD QL SMEAR: SLIGHT
POTASSIUM SERPL-SCNC: 3.8 MMOL/L (ref 3.4–5.3)
PR INTERVAL - MUSE: 152 MS
PROT SERPL-MCNC: 6.6 G/DL (ref 6.4–8.3)
QRS DURATION - MUSE: 82 MS
QT - MUSE: 400 MS
QTC - MUSE: 472 MS
R AXIS - MUSE: 51 DEGREES
RBC # BLD AUTO: 1.87 10E6/UL (ref 3.8–5.2)
RBC MORPH BLD: ABNORMAL
RETICS # AUTO: 0.4 10E6/UL (ref 0.03–0.1)
RETICS/RBC NFR AUTO: 21.4 % (ref 0.5–2)
SICKLE CELLS BLD QL SMEAR: ABNORMAL
SODIUM SERPL-SCNC: 136 MMOL/L (ref 135–145)
SYSTOLIC BLOOD PRESSURE - MUSE: NORMAL MMHG
T AXIS - MUSE: 29 DEGREES
TARGETS BLD QL SMEAR: SLIGHT
TROPONIN T SERPL HS-MCNC: <6 NG/L
VENTRICULAR RATE- MUSE: 84 BPM
WBC # BLD AUTO: 13.5 10E3/UL (ref 4–11)

## 2025-06-06 PROCEDURE — 96375 TX/PRO/DX INJ NEW DRUG ADDON: CPT | Performed by: EMERGENCY MEDICINE

## 2025-06-06 PROCEDURE — 96376 TX/PRO/DX INJ SAME DRUG ADON: CPT | Performed by: EMERGENCY MEDICINE

## 2025-06-06 PROCEDURE — 250N000009 HC RX 250: Performed by: EMERGENCY MEDICINE

## 2025-06-06 PROCEDURE — 99285 EMERGENCY DEPT VISIT HI MDM: CPT | Mod: 25 | Performed by: EMERGENCY MEDICINE

## 2025-06-06 PROCEDURE — 84703 CHORIONIC GONADOTROPIN ASSAY: CPT | Performed by: EMERGENCY MEDICINE

## 2025-06-06 PROCEDURE — 258N000003 HC RX IP 258 OP 636: Performed by: EMERGENCY MEDICINE

## 2025-06-06 PROCEDURE — 96374 THER/PROPH/DIAG INJ IV PUSH: CPT | Performed by: EMERGENCY MEDICINE

## 2025-06-06 PROCEDURE — 85025 COMPLETE CBC W/AUTO DIFF WBC: CPT | Performed by: EMERGENCY MEDICINE

## 2025-06-06 PROCEDURE — 93010 ELECTROCARDIOGRAM REPORT: CPT | Performed by: EMERGENCY MEDICINE

## 2025-06-06 PROCEDURE — 250N000013 HC RX MED GY IP 250 OP 250 PS 637: Performed by: EMERGENCY MEDICINE

## 2025-06-06 PROCEDURE — 96361 HYDRATE IV INFUSION ADD-ON: CPT | Performed by: EMERGENCY MEDICINE

## 2025-06-06 PROCEDURE — 85045 AUTOMATED RETICULOCYTE COUNT: CPT | Performed by: EMERGENCY MEDICINE

## 2025-06-06 PROCEDURE — 93005 ELECTROCARDIOGRAM TRACING: CPT | Performed by: EMERGENCY MEDICINE

## 2025-06-06 PROCEDURE — 71046 X-RAY EXAM CHEST 2 VIEWS: CPT

## 2025-06-06 PROCEDURE — 84484 ASSAY OF TROPONIN QUANT: CPT | Performed by: EMERGENCY MEDICINE

## 2025-06-06 PROCEDURE — 36415 COLL VENOUS BLD VENIPUNCTURE: CPT | Performed by: EMERGENCY MEDICINE

## 2025-06-06 PROCEDURE — 99284 EMERGENCY DEPT VISIT MOD MDM: CPT | Performed by: EMERGENCY MEDICINE

## 2025-06-06 PROCEDURE — 82947 ASSAY GLUCOSE BLOOD QUANT: CPT | Performed by: EMERGENCY MEDICINE

## 2025-06-06 PROCEDURE — 250N000011 HC RX IP 250 OP 636: Mod: JZ | Performed by: EMERGENCY MEDICINE

## 2025-06-06 RX ORDER — MAGNESIUM HYDROXIDE/ALUMINUM HYDROXICE/SIMETHICONE 120; 1200; 1200 MG/30ML; MG/30ML; MG/30ML
15 SUSPENSION ORAL ONCE
Status: COMPLETED | OUTPATIENT
Start: 2025-06-06 | End: 2025-06-06

## 2025-06-06 RX ORDER — LIDOCAINE HYDROCHLORIDE 20 MG/ML
10 SOLUTION OROPHARYNGEAL ONCE
Status: COMPLETED | OUTPATIENT
Start: 2025-06-06 | End: 2025-06-06

## 2025-06-06 RX ORDER — KETOROLAC TROMETHAMINE 30 MG/ML
30 INJECTION, SOLUTION INTRAMUSCULAR; INTRAVENOUS ONCE
Status: COMPLETED | OUTPATIENT
Start: 2025-06-06 | End: 2025-06-06

## 2025-06-06 RX ORDER — HEPARIN SODIUM (PORCINE) LOCK FLUSH IV SOLN 100 UNIT/ML 100 UNIT/ML
5-10 SOLUTION INTRAVENOUS
Status: DISCONTINUED | OUTPATIENT
Start: 2025-06-06 | End: 2025-06-06 | Stop reason: HOSPADM

## 2025-06-06 RX ADMIN — KETOROLAC TROMETHAMINE 30 MG: 30 INJECTION, SOLUTION INTRAMUSCULAR at 03:24

## 2025-06-06 RX ADMIN — HYDROMORPHONE HYDROCHLORIDE 1 MG: 1 INJECTION, SOLUTION INTRAMUSCULAR; INTRAVENOUS; SUBCUTANEOUS at 05:42

## 2025-06-06 RX ADMIN — SODIUM CHLORIDE 1000 ML: 0.9 INJECTION, SOLUTION INTRAVENOUS at 03:24

## 2025-06-06 RX ADMIN — FAMOTIDINE 20 MG: 10 INJECTION, SOLUTION INTRAVENOUS at 03:25

## 2025-06-06 RX ADMIN — HYDROMORPHONE HYDROCHLORIDE 1 MG: 1 INJECTION, SOLUTION INTRAMUSCULAR; INTRAVENOUS; SUBCUTANEOUS at 04:28

## 2025-06-06 RX ADMIN — ALUMINUM HYDROXIDE, MAGNESIUM HYDROXIDE, AND DIMETHICONE 15 ML: 200; 20; 200 SUSPENSION ORAL at 03:22

## 2025-06-06 RX ADMIN — HYDROMORPHONE HYDROCHLORIDE 1 MG: 1 INJECTION, SOLUTION INTRAMUSCULAR; INTRAVENOUS; SUBCUTANEOUS at 03:24

## 2025-06-06 RX ADMIN — HEPARIN SODIUM (PORCINE) LOCK FLUSH IV SOLN 100 UNIT/ML 5 ML: 100 SOLUTION at 07:50

## 2025-06-06 RX ADMIN — LIDOCAINE HYDROCHLORIDE 10 ML: 20 SOLUTION ORAL at 03:22

## 2025-06-06 ASSESSMENT — ACTIVITIES OF DAILY LIVING (ADL)
ADLS_ACUITY_SCORE: 58

## 2025-06-06 NOTE — ED TRIAGE NOTES
Triage Assessment (Adult)       Row Name 06/06/25 0241          Triage Assessment    Airway WDL WDL        Respiratory WDL    Respiratory WDL WDL        Skin Circulation/Temperature WDL    Skin Circulation/Temperature WDL WDL        Cardiac WDL    Cardiac WDL X;chest pain        Chest Pain Assessment    Chest Pain Location midsternal     Precipitating Factors activity     Alleviating Factors activity;eating        Cognitive/Neuro/Behavioral WDL    Cognitive/Neuro/Behavioral WDL WDL        [place] Coma Scale    Best Eye Response 4-->(E4) spontaneous     Best Motor Response 6-->(M6) obeys commands     Best Verbal Response 5-->(V5) oriented     [place] Coma Scale Score 15

## 2025-06-06 NOTE — ED PROVIDER NOTES
ED Provider Note  [facility]      History     Chief Complaint   Patient presents with    Sickle Cell Pain Crisis    Chest Pain     For 2 days now, denies SOB.    Oral Swelling     Pt reports, pain in the left lower lip. No lesions noted but noted swelling     HPI  Jennifer Cervantes is a 25 year old female with a history notable for Hb-SS with ED care plan, acute on chronic abdominal pain, CVA with residual deficits, and PE who presents to the ED with a complaint of whole body pain and central chest discomfort which she woke up with an hour prior to arrival.  She denies any stuffy nose or sore throat but states she has had a mild cough recently which has been dry.  She thought perhaps it was allergies or something inconsequential so was trying to blow it off.  She denies any fever with it.  No abdominal pain, nausea, vomiting, diarrhea.  She states that however about an hour prior to arrival she woke up with some burning in her chest with coughing, and whole body pain.  She states that she has gotten chest pain was her sickle cell crisis from time to time, but it is not real common.  There is been no trauma.  She has been taking her home medications.    Past Medical History  Past Medical History:   Diagnosis Date    Anxiety     Bleeding disorder     Blood clotting disorder     Cerebral infarction (H) 2015    Cognitive developmental delay     low IQ. 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 side (H)     right hand contractures    Iron overload due to repeated red blood cell transfusions     Migraines     Multiple subsegmental pulmonary emboli without acute cor pulmonale (H) 02/01/2021    Oppositional defiant behavior     Presence of intrauterine contraceptive device 2/18/2020    Superficial venous thrombosis of arm, right 03/25/2021    Uncomplicated asthma      Past  Surgical History:   Procedure Laterality Date    AS INSERT TUNNELED CV 2 CATH W/O PORT/PUMP      CHOLECYSTECTOMY      CV RIGHT HEART CATH MEASUREMENTS RECORDED N/A 11/18/2021    Procedure: Right Heart Cath;  Surgeon: Jackson Stauffer MD;  Location:  HEART CARDIAC CATH LAB    INSERT PORT VASCULAR ACCESS Left 4/21/2021    Procedure: INSERTION, VASCULAR ACCESS PORT (NOT SURE ON SIDE UNTIL REMOVAL);  Surgeon: Rajan More MD;  Location: [facility] OR    IR CHEST PORT PLACEMENT > 5 YRS OF AGE  4/21/2021    IR CVC NON TUNNEL LINE REMOVAL  5/6/2021    IR CVC NON TUNNEL PLACEMENT > 5 YRS  04/07/2020    IR CVC NON TUNNEL PLACEMENT > 5 YRS  4/30/2021    IR CVC NON TUNNEL PLACEMENT > 5 YRS  9/7/2022    IR PORT REMOVAL LEFT  4/21/2021    REMOVE PORT VASCULAR ACCESS Left 4/21/2021    Procedure: REMOVAL, VASCULAR ACCESS PORT LEFT;  Surgeon: Rajan More MD;  Location: [facility] OR    REPAIR TENDON ELBOW Right 10/02/2019    Procedure: Right Elbow Flexor Lengthening, Flexor Pronator Slide Of Wrist and Finger, Thumb Adductor Lengthening;  Surgeon: Anai Franco MD;  Location: [facility] OR    TONSILLECTOMY Bilateral 10/02/2019    Procedure: Bilateral Tonsillectomy;  Surgeon: Farhana Guy MD;  Location: [facility] OR    ZZC BREAST REDUCTION (INCLUDES LIPO) TIER 3 Bilateral 04/23/2019     albuterol (PROVENTIL) (2.5 MG/3ML) 0.083% neb solution  aspirin (ASA) 81 MG chewable tablet  azelastine (ASTELIN) 0.1 % nasal spray  budesonide-formoterol (SYMBICORT/BREYNA) 160-4.5 MCG/ACT Inhaler  deferasirox (JADENU) 360 MG tablet  Deferiprone, Twice Daily, 1000 MG TABS  diclofenac (VOLTAREN) 1 % topical gel  diphenhydrAMINE (BENADRYL) 50 MG capsule  EPINEPHrine (ANY BX GENERIC EQUIV) 0.3 MG/0.3ML injection 2-pack  hydroxyurea (HYDREA) 500 MG capsule  ibuprofen (ADVIL/MOTRIN) 800 MG tablet  ketotifen fumarate 0.035% 0.035 % SOLN ophthalmic solution  methocarbamol (ROBAXIN) 750 MG tablet  methylPREDNISolone (MEDROL) 32 MG tablet  naloxone  "(NARCAN) 4 MG/0.1ML nasal spray  naproxen (NAPROSYN) 500 MG tablet  oxyCODONE IR (ROXICODONE) 10 MG tablet  pantoprazole (PROTONIX) 40 MG EC tablet  sertraline (ZOLOFT) 50 MG tablet      Allergies   Allergen Reactions    Contrast Dye Angioedema     Hives and breathing issues    Fish-Derived Products Hives    Seafood Hives    Adhesive Tape Hives     Primipore dressing causes hives    Gadolinium     Iodinated Contrast Media      Family History  Family History   Problem Relation Age of Onset    Sickle Cell Trait Mother     Hypertension Mother     Asthma Mother     Sickle Cell Trait Father     Glaucoma No family hx of     Macular Degeneration No family hx of     Diabetes No family hx of     Gout No family hx of      Social History   Social History     Tobacco Use    Smoking status: Never     Passive exposure: Never    Smokeless tobacco: Never   Substance Use Topics    Alcohol use: Not Currently     Alcohol/week: 0.0 standard drinks of alcohol    Drug use: Never      A medically appropriate review of systems was performed with pertinent positives and negatives noted in the HPI, and all other systems negative.    Physical Exam   BP: 129/87  Pulse: 86  Temp: 98.3  F (36.8  C)  Resp: 18  Height: 165.1 cm (5' 5\")  Weight: 70.3 kg (155 lb)  SpO2: 95 %  Physical Exam  Constitutional:       General: She is not in acute distress.     Appearance: Normal appearance. She is not toxic-appearing.   HENT:      Head: Atraumatic.      Mouth/Throat:      Mouth: Mucous membranes are moist.   Eyes:      General: No scleral icterus.     Conjunctiva/sclera: Conjunctivae normal.   Cardiovascular:      Rate and Rhythm: Normal rate.      Heart sounds: Normal heart sounds.   Pulmonary:      Effort: No respiratory distress.      Breath sounds: Normal breath sounds.   Chest:      Chest wall: No tenderness.   Abdominal:      Palpations: Abdomen is soft.      Tenderness: There is no abdominal tenderness.   Musculoskeletal:         General: No " tenderness or deformity.      Cervical back: Neck supple.   Skin:     General: Skin is warm.      Findings: No rash.   Neurological:      Mental Status: She is alert.           ED Course, Procedures, & Data      Procedures            EKG Interpretation:      Interpreted by Court Ring MD  Time reviewed: 0240  Symptoms at time of EKG: chest pain   Rhythm: normal sinus   Rate: normal  Axis: normal  Ectopy: none  Conduction: normal  ST Segments/ T Waves: No ST-T wave changes  Q Waves: none  Comparison to prior: Unchanged    Clinical Impression: normal EKG           Results for orders placed or performed during the hospital encounter of 06/06/25   Chest XR,  PA & LAT     Status: None    Narrative    EXAM: XR CHEST 2 VIEWS  LOCATION: [facility]  DATE: 6/6/2025    INDICATION: chest pain, sickle cell dz  COMPARISON: 5/30/2025      Impression    IMPRESSION: Left portacatheter with tip in the SVC.  Mild elevation right hemidiaphragm. Heart size and pulmonary vascularity within normal limits. No focal lung infiltrates. Osseous structures grossly intact. Visualized upper abdomen unremarkable.   Troponin T, High Sensitivity     Status: Normal   Result Value Ref Range    Troponin T, High Sensitivity <6 <=14 ng/L   Comprehensive metabolic panel     Status: Abnormal   Result Value Ref Range    Sodium 136 135 - 145 mmol/L    Potassium 3.8 3.4 - 5.3 mmol/L    Carbon Dioxide (CO2) 22 22 - 29 mmol/L    Anion Gap 10 7 - 15 mmol/L    Urea Nitrogen 6.8 6.0 - 20.0 mg/dL    Creatinine 0.51 0.51 - 0.95 mg/dL    GFR Estimate >90 >60 mL/min/1.73m2    Calcium 7.7 (L) 8.8 - 10.4 mg/dL    Chloride 104 98 - 107 mmol/L    Glucose 100 (H) 70 - 99 mg/dL    Alkaline Phosphatase 53 40 - 150 U/L    AST 46 (H) 0 - 45 U/L    ALT 34 0 - 50 U/L    Protein Total 6.6 6.4 - 8.3 g/dL    Albumin 4.0 3.5 - 5.2 g/dL    Bilirubin Total 2.5 (H) <=1.2 mg/dL   Reticulocyte count     Status: Abnormal   Result  Value Ref Range    % Reticulocyte 21.4 (H) 0.5 - 2.0 %    Absolute Reticulocyte 0.400 (H) 0.025 - 0.095 10e6/uL   HCG qualitative pregnancy (blood)     Status: Normal   Result Value Ref Range    hCG Serum Qualitative Negative Negative   CBC with platelets and differential     Status: Abnormal   Result Value Ref Range    WBC Count 13.5 (H) 4.0 - 11.0 10e3/uL    RBC Count 1.87 (L) 3.80 - 5.20 10e6/uL    Hemoglobin 5.9 (LL) 11.7 - 15.7 g/dL    Hematocrit 16.1 (L) 35.0 - 47.0 %    MCV 86 78 - 100 fL    MCH 31.6 26.5 - 33.0 pg    MCHC 36.6 (H) 31.5 - 36.5 g/dL    RDW 25.1 (H) 10.0 - 15.0 %    Platelet Count 401 150 - 450 10e3/uL    % Neutrophils 68 %    % Lymphocytes 17 %    % Monocytes 9 %    % Eosinophils 3 %    % Basophils 1 %    % Immature Granulocytes 1 %    NRBCs per 100 WBC 2 (H) <1 /100    Absolute Neutrophils 9.2 (H) 1.6 - 8.3 10e3/uL    Absolute Lymphocytes 2.4 0.8 - 5.3 10e3/uL    Absolute Monocytes 1.3 0.0 - 1.3 10e3/uL    Absolute Eosinophils 0.4 0.0 - 0.7 10e3/uL    Absolute Basophils 0.1 0.0 - 0.2 10e3/uL    Absolute Immature Granulocytes 0.1 <=0.4 10e3/uL    Absolute NRBCs 0.3 10e3/uL   RBC and Platelet Morphology     Status: Abnormal   Result Value Ref Range    RBC Morphology Confirmed RBC Indices     Platelet Assessment  Automated Count Confirmed. Platelet morphology is normal.     Automated Count Confirmed. Platelet morphology is normal.    Polychromasia Slight (A) None Seen    RBC Fragments Slight (A) None Seen    Sickle Cells Moderate (A) None Seen    Target Cells Slight (A) None Seen   EKG 12 lead     Status: None (Preliminary result)   Result Value Ref Range    Systolic Blood Pressure  mmHg    Diastolic Blood Pressure  mmHg    Ventricular Rate 84 BPM    Atrial Rate 84 BPM    TN Interval 152 ms    QRS Duration 82 ms     ms    QTc 472 ms    P Axis 80 degrees    R AXIS 51 degrees    T Axis 29 degrees    Interpretation ECG Sinus rhythm  Normal ECG      CBC with platelets differential     Status:  Abnormal    Narrative    The following orders were created for panel order CBC with platelets differential.  Procedure                               Abnormality         Status                     ---------                               -----------         ------                     CBC with platelets and ...[[id number]]  Abnormal            Final result               RBC and Platelet Morpho...[[id number]]  Abnormal            Final result                 Please view results for these tests on the individual orders.     Medications   HYDROmorphone (DILAUDID) injection 1 mg (1 mg Intravenous $Given 6/6/25 0542)   ketorolac (TORADOL) injection 30 mg (30 mg Intravenous $Given 6/6/25 0324)   famotidine (PEPCID) injection 20 mg (20 mg Intravenous $Given 6/6/25 0325)   alum & mag hydroxide-simethicone (MAALOX) suspension 15 mL (15 mLs Oral $Given 6/6/25 0322)   lidocaine (viscous) (XYLOCAINE) 2 % solution 10 mL (10 mLs Mouth/Throat $Given 6/6/25 0322)   sodium chloride 0.9% BOLUS 1,000 mL (0 mLs Intravenous Stopped 6/6/25 0526)     Labs Ordered and Resulted from Time of ED Arrival to Time of ED Departure   COMPREHENSIVE METABOLIC PANEL - Abnormal       Result Value    Sodium 136      Potassium 3.8      Carbon Dioxide (CO2) 22      Anion Gap 10      Urea Nitrogen 6.8      Creatinine 0.51      GFR Estimate >90      Calcium 7.7 (*)     Chloride 104      Glucose 100 (*)     Alkaline Phosphatase 53      AST 46 (*)     ALT 34      Protein Total 6.6      Albumin 4.0      Bilirubin Total 2.5 (*)    RETICULOCYTE COUNT - Abnormal    % Reticulocyte 21.4 (*)     Absolute Reticulocyte 0.400 (*)    CBC WITH PLATELETS AND DIFFERENTIAL - Abnormal    WBC Count 13.5 (*)     RBC Count 1.87 (*)     Hemoglobin 5.9 (*)     Hematocrit 16.1 (*)     MCV 86      MCH 31.6      MCHC 36.6 (*)     RDW 25.1 (*)     Platelet Count 401      % Neutrophils 68      % Lymphocytes 17      % Monocytes 9      % Eosinophils 3      % Basophils 1      % Immature  Granulocytes 1      NRBCs per 100 WBC 2 (*)     Absolute Neutrophils 9.2 (*)     Absolute Lymphocytes 2.4      Absolute Monocytes 1.3      Absolute Eosinophils 0.4      Absolute Basophils 0.1      Absolute Immature Granulocytes 0.1      Absolute NRBCs 0.3     RBC AND PLATELET MORPHOLOGY - Abnormal    RBC Morphology Confirmed RBC Indices      Platelet Assessment        Value: Automated Count Confirmed. Platelet morphology is normal.    Polychromasia Slight (*)     RBC Fragments Slight (*)     Sickle Cells Moderate (*)     Target Cells Slight (*)    TROPONIN T, HIGH SENSITIVITY - Normal    Troponin T, High Sensitivity <6     HCG QUALITATIVE PREGNANCY - Normal    hCG Serum Qualitative Negative       Chest XR,  PA & LAT   Final Result   IMPRESSION: Left portacatheter with tip in the SVC.  Mild elevation right hemidiaphragm. Heart size and pulmonary vascularity within normal limits. No focal lung infiltrates. Osseous structures grossly intact. Visualized upper abdomen unremarkable.             Critical care was not performed.     Medical Decision Making  The patient's presentation was of moderate complexity (a chronic illness mild to moderate exacerbation, progression, or side effect of treatment).    The patient's evaluation involved:  review of external note(s) from 1 sources (previous note)  review of 3+ test result(s) ordered prior to this encounter (previous results)  ordering and/or review of 3+ test(s) in this encounter (see separate area of note for details)  discussion of management or test interpretation with another health professional (heme/onc fellow)    The patient's management necessitated high risk (a parenteral controlled substance).    Assessment & Plan    EKG was done and unremarkable.  Troponin is unremarkable as well.  My concern for acute coronary syndrome is very low.  No sign of pneumonia or pneumothorax on chest x-ray.  This does not seem consistent with acute chest syndrome.  I certainly  considered PE, but the pain is burning in nature, seems to only bother her when she coughs.  She was given a GI cocktail as well as her normal pain regimen, states she feels significantly improved.  My concern for PE is low at this point.  There is been no shortness of breath at all.  She is not tachycardic.  No specific lower extremity pain or swelling aside from her current sickle cell pain.  We did check some basic labs, hemoglobin is down from her baseline of around 7-5.9.  However, there was an error in ordering, and thus the blood did not get drawn until after she received her IV fluids.  Certainly, this could be a delusional effect.  I did speak with the hematology fellow who felt this was not a concerning finding and if she was feeling better that she could discharge home.  She states she is seen every Monday in the infusion clinic, does typically see a provider at that time.  She is encouraged to return with any new or worsening symptoms, any other concerns.  She verbalizes understanding and is agreeable to the plan.    Dictation Disclaimer: Some of this Note has been completed with voice-recognition dictation software. Although errors are generally corrected real-time, there is the potential for a rare error to be present in the completed chart.      I have reviewed the nursing notes. I have reviewed the findings, diagnosis, plan and need for follow up with the patient.    New Prescriptions    No medications on file       Final diagnoses:   Sickle cell pain crisis (H)       Court Ring  [facility] EMERGENCY DEPARTMENT  6/6/2025     Court Ring MD  06/06/25 0700

## 2025-06-09 ENCOUNTER — NURSE TRIAGE (OUTPATIENT)
Dept: ONCOLOGY | Facility: CLINIC | Age: 25
End: 2025-06-09

## 2025-06-09 ENCOUNTER — INFUSION THERAPY VISIT (OUTPATIENT)
Dept: TRANSPLANT | Facility: CLINIC | Age: 25
End: 2025-06-09
Attending: PEDIATRICS
Payer: MEDICAID

## 2025-06-09 VITALS
TEMPERATURE: 98 F | DIASTOLIC BLOOD PRESSURE: 81 MMHG | SYSTOLIC BLOOD PRESSURE: 127 MMHG | RESPIRATION RATE: 18 BRPM | OXYGEN SATURATION: 94 % | HEART RATE: 99 BPM

## 2025-06-09 DIAGNOSIS — D57.00 SICKLE CELL DISEASE WITH CRISIS (H): ICD-10-CM

## 2025-06-09 DIAGNOSIS — G81.10 SPASTIC HEMIPLEGIA, UNSPECIFIED ETIOLOGY, UNSPECIFIED LATERALITY (H): ICD-10-CM

## 2025-06-09 DIAGNOSIS — D57.00 SICKLE CELL PAIN CRISIS (H): Primary | ICD-10-CM

## 2025-06-09 LAB
ANION GAP SERPL CALCULATED.3IONS-SCNC: 14 MMOL/L (ref 7–15)
BASOPHILS # BLD AUTO: 0.2 10E3/UL (ref 0–0.2)
BASOPHILS NFR BLD AUTO: 2 %
BUN SERPL-MCNC: 7.6 MG/DL (ref 6–20)
CALCIUM SERPL-MCNC: 9.2 MG/DL (ref 8.8–10.4)
CHLORIDE SERPL-SCNC: 104 MMOL/L (ref 98–107)
CREAT SERPL-MCNC: 0.49 MG/DL (ref 0.51–0.95)
EGFRCR SERPLBLD CKD-EPI 2021: >90 ML/MIN/1.73M2
EOSINOPHIL # BLD AUTO: 0.3 10E3/UL (ref 0–0.7)
EOSINOPHIL NFR BLD AUTO: 3 %
ERYTHROCYTE [DISTWIDTH] IN BLOOD BY AUTOMATED COUNT: 24.4 % (ref 10–15)
GLUCOSE SERPL-MCNC: 91 MG/DL (ref 70–99)
HCO3 SERPL-SCNC: 19 MMOL/L (ref 22–29)
HCT VFR BLD AUTO: 19.9 % (ref 35–47)
HGB BLD-MCNC: 7 G/DL (ref 11.7–15.7)
IMM GRANULOCYTES # BLD: 0.1 10E3/UL
IMM GRANULOCYTES NFR BLD: 1 %
LYMPHOCYTES # BLD AUTO: 1.7 10E3/UL (ref 0.8–5.3)
LYMPHOCYTES NFR BLD AUTO: 16 %
MCH RBC QN AUTO: 30.4 PG (ref 26.5–33)
MCHC RBC AUTO-ENTMCNC: 35.2 G/DL (ref 31.5–36.5)
MCV RBC AUTO: 87 FL (ref 78–100)
MONOCYTES # BLD AUTO: 0.9 10E3/UL (ref 0–1.3)
MONOCYTES NFR BLD AUTO: 8 %
NEUTROPHILS # BLD AUTO: 7.9 10E3/UL (ref 1.6–8.3)
NEUTROPHILS NFR BLD AUTO: 71 %
NRBC # BLD AUTO: 0.3 10E3/UL
NRBC BLD AUTO-RTO: 2 /100
PLATELET # BLD AUTO: 495 10E3/UL (ref 150–450)
POTASSIUM SERPL-SCNC: 3.7 MMOL/L (ref 3.4–5.3)
RBC # BLD AUTO: 2.3 10E6/UL (ref 3.8–5.2)
RETICS # AUTO: 0.49 10E6/UL (ref 0.03–0.1)
RETICS/RBC NFR AUTO: 21.6 % (ref 0.5–2)
SODIUM SERPL-SCNC: 137 MMOL/L (ref 135–145)
WBC # BLD AUTO: 11.1 10E3/UL (ref 4–11)

## 2025-06-09 PROCEDURE — 96374 THER/PROPH/DIAG INJ IV PUSH: CPT

## 2025-06-09 PROCEDURE — 36591 DRAW BLOOD OFF VENOUS DEVICE: CPT | Performed by: PEDIATRICS

## 2025-06-09 PROCEDURE — 250N000013 HC RX MED GY IP 250 OP 250 PS 637: Performed by: PEDIATRICS

## 2025-06-09 PROCEDURE — 96361 HYDRATE IV INFUSION ADD-ON: CPT

## 2025-06-09 PROCEDURE — 96375 TX/PRO/DX INJ NEW DRUG ADDON: CPT

## 2025-06-09 PROCEDURE — 96376 TX/PRO/DX INJ SAME DRUG ADON: CPT

## 2025-06-09 PROCEDURE — 82435 ASSAY OF BLOOD CHLORIDE: CPT | Performed by: PEDIATRICS

## 2025-06-09 PROCEDURE — 85025 COMPLETE CBC W/AUTO DIFF WBC: CPT | Performed by: PEDIATRICS

## 2025-06-09 PROCEDURE — 85045 AUTOMATED RETICULOCYTE COUNT: CPT | Performed by: PEDIATRICS

## 2025-06-09 PROCEDURE — 258N000003 HC RX IP 258 OP 636: Performed by: PEDIATRICS

## 2025-06-09 PROCEDURE — 250N000011 HC RX IP 250 OP 636: Mod: JZ | Performed by: PEDIATRICS

## 2025-06-09 RX ORDER — EPINEPHRINE 1 MG/ML
0.3 INJECTION, SOLUTION INTRAMUSCULAR; SUBCUTANEOUS EVERY 5 MIN PRN
OUTPATIENT
Start: 2025-06-19

## 2025-06-09 RX ORDER — MEPERIDINE HYDROCHLORIDE 25 MG/ML
25 INJECTION INTRAMUSCULAR; INTRAVENOUS; SUBCUTANEOUS
OUTPATIENT
Start: 2025-06-19

## 2025-06-09 RX ORDER — DIPHENHYDRAMINE HCL 25 MG
50 CAPSULE ORAL
Status: COMPLETED | OUTPATIENT
Start: 2025-06-09 | End: 2025-06-09

## 2025-06-09 RX ORDER — DIPHENHYDRAMINE HYDROCHLORIDE 50 MG/ML
50 INJECTION, SOLUTION INTRAMUSCULAR; INTRAVENOUS
Start: 2025-06-19

## 2025-06-09 RX ORDER — ONDANSETRON 4 MG/1
8 TABLET, FILM COATED ORAL
Start: 2025-08-01

## 2025-06-09 RX ORDER — ONDANSETRON 2 MG/ML
8 INJECTION INTRAMUSCULAR; INTRAVENOUS EVERY 6 HOURS PRN
Status: CANCELLED
Start: 2025-08-01

## 2025-06-09 RX ORDER — HEPARIN SODIUM,PORCINE 10 UNIT/ML
5 VIAL (ML) INTRAVENOUS
OUTPATIENT
Start: 2025-06-19

## 2025-06-09 RX ORDER — HEPARIN SODIUM (PORCINE) LOCK FLUSH IV SOLN 100 UNIT/ML 100 UNIT/ML
5 SOLUTION INTRAVENOUS
OUTPATIENT
Start: 2025-06-19

## 2025-06-09 RX ORDER — OXYCODONE HYDROCHLORIDE 10 MG/1
10 TABLET ORAL EVERY 6 HOURS PRN
Qty: 12 TABLET | Refills: 0 | Status: SHIPPED | OUTPATIENT
Start: 2025-06-09

## 2025-06-09 RX ORDER — ALBUTEROL SULFATE 0.83 MG/ML
2.5 SOLUTION RESPIRATORY (INHALATION)
OUTPATIENT
Start: 2025-06-19

## 2025-06-09 RX ORDER — HEPARIN SODIUM (PORCINE) LOCK FLUSH IV SOLN 100 UNIT/ML 100 UNIT/ML
5 SOLUTION INTRAVENOUS
Status: CANCELLED | OUTPATIENT
Start: 2025-08-01

## 2025-06-09 RX ORDER — ONDANSETRON 2 MG/ML
8 INJECTION INTRAMUSCULAR; INTRAVENOUS EVERY 6 HOURS PRN
Status: DISCONTINUED | OUTPATIENT
Start: 2025-06-09 | End: 2025-06-09 | Stop reason: HOSPADM

## 2025-06-09 RX ORDER — ALBUTEROL SULFATE 90 UG/1
1-2 INHALANT RESPIRATORY (INHALATION)
Start: 2025-06-19

## 2025-06-09 RX ORDER — KETOROLAC TROMETHAMINE 30 MG/ML
30 INJECTION, SOLUTION INTRAMUSCULAR; INTRAVENOUS ONCE
Status: CANCELLED
Start: 2025-08-01 | End: 2025-08-01

## 2025-06-09 RX ORDER — KETOROLAC TROMETHAMINE 30 MG/ML
30 INJECTION, SOLUTION INTRAMUSCULAR; INTRAVENOUS ONCE
Status: COMPLETED | OUTPATIENT
Start: 2025-06-09 | End: 2025-06-09

## 2025-06-09 RX ORDER — DIPHENHYDRAMINE HCL 25 MG
50 CAPSULE ORAL
Status: CANCELLED
Start: 2025-08-01

## 2025-06-09 RX ORDER — DIPHENHYDRAMINE HYDROCHLORIDE 50 MG/ML
25 INJECTION, SOLUTION INTRAMUSCULAR; INTRAVENOUS
Start: 2025-06-19

## 2025-06-09 RX ORDER — METHYLPREDNISOLONE SODIUM SUCCINATE 40 MG/ML
40 INJECTION INTRAMUSCULAR; INTRAVENOUS
Start: 2025-06-19

## 2025-06-09 RX ORDER — HEPARIN SODIUM,PORCINE 10 UNIT/ML
5 VIAL (ML) INTRAVENOUS
OUTPATIENT
Start: 2025-08-01

## 2025-06-09 RX ADMIN — DIPHENHYDRAMINE HYDROCHLORIDE 50 MG: 25 CAPSULE ORAL at 09:14

## 2025-06-09 RX ADMIN — HYDROMORPHONE HYDROCHLORIDE 1 MG: 1 INJECTION, SOLUTION INTRAMUSCULAR; INTRAVENOUS; SUBCUTANEOUS at 10:20

## 2025-06-09 RX ADMIN — HYDROMORPHONE HYDROCHLORIDE 1 MG: 1 INJECTION, SOLUTION INTRAMUSCULAR; INTRAVENOUS; SUBCUTANEOUS at 11:19

## 2025-06-09 RX ADMIN — ONDANSETRON 8 MG: 2 INJECTION INTRAMUSCULAR; INTRAVENOUS at 09:14

## 2025-06-09 RX ADMIN — HYDROMORPHONE HYDROCHLORIDE 1 MG: 1 INJECTION, SOLUTION INTRAMUSCULAR; INTRAVENOUS; SUBCUTANEOUS at 09:15

## 2025-06-09 RX ADMIN — KETOROLAC TROMETHAMINE 30 MG: 30 INJECTION, SOLUTION INTRAMUSCULAR; INTRAVENOUS at 09:14

## 2025-06-09 RX ADMIN — SODIUM CHLORIDE, SODIUM LACTATE, POTASSIUM CHLORIDE, AND CALCIUM CHLORIDE 1000 ML: .6; .31; .03; .02 INJECTION, SOLUTION INTRAVENOUS at 09:14

## 2025-06-09 ASSESSMENT — PAIN SCALES - GENERAL: PAINLEVEL_OUTOF10: SEVERE PAIN (8)

## 2025-06-09 NOTE — TELEPHONE ENCOUNTER
Narcotic Refill Request    Medication(s) requested:  Oxycodone  Person Requesting Refill: Patient  What pain is the medication treating: Sickle cell pain  How is the medication being taken?:1 tablet every 6 hrs  Does pt have enough for today? No  Is pain being adequately controlled on the current regimen?: Yes  Experiencing any side effects from medication?: No    Date of most recent appointment:  05/23/25 Karl Mantilla  Any No Show Visits: No  Next appointment:   07/18/25 Karl Mantilla  Last fill date and by whom:  06/02/25 by Patricia Mantilla   Reviewed: No access    Routed provider: Patricia Mantilla

## 2025-06-09 NOTE — TELEPHONE ENCOUNTER
Available appt with BMT at 0830 labs and 0900 infusion.    Call placed to pt and confirmed she is able to make appts.    Message sent to CCOD to have pt scheduled.

## 2025-06-09 NOTE — TELEPHONE ENCOUNTER
"Oncology Nurse Triage - Sickle Cell Pain Crisis:    Situation: Jennifer  calling about Sickle Cell Pain Crisis, requesting to be added on for IV fluids and pain medicine    Background:     Patient's last infusion was 06/06/25 ED  Last clinic visit date:05/23/25 w/Patricia Mantilla  Does patient have active treatment plan? Yes    Assessment of Symptoms:  Onset/Duration of symptoms: 1 day    Is it typical sickle cell pain? Yes  Location: \"generalized\"  Character: Dull ache  Intensity: 8/10    Any radiation of pain, numbness, tingling, weakness, warmth, swelling, discoloration of arms or legs?No    Fever? No    Chest Pain Present: No    Shortness of breath: No    Other home therapies tried: HEAT/HEATING PAD and WARM BATH     Last home medication taken and when: 10pm oxycodone    Any Refills Needed?: Yes    Does patient have transportation & length of time to get to clinic: Can get own transporation if before 11am. Will need assistance if after 11.      Recommendations:   [phone] Secure message to Patricia Mantilla  [phone] Patricia approving adding pt to wait list.    If you do not hear from the infusion center by 2pm then you will not be able to get in for an infusion today. If symptoms worsen while waiting for call back, and/or you experience fever, chills, SOB, chest pain, cough, n/v, dizziness, numbness, swelling, discoloration of extremities, then seek emergency evaluation in Emergency Department.               "

## 2025-06-09 NOTE — PROGRESS NOTES
Infusion Nursing Note:  Jennifer Cervantes presents today for add-on infusion.    Patient seen by provider today: No   present during visit today: Not Applicable.    Note: Patient received dilaudid x3, zofran, benadryl, and 1 L LR bolus per therapy plan for 8/10 generalized pain. Pain improved upon discharge. Stable upon discharge.      Intravenous Access:  Implanted Port.    Treatment Conditions:  Results reviewed, labs MET treatment parameters, ok to proceed with treatment.      Post Infusion Assessment:  Patient tolerated infusion without incident.       Discharge Plan:   Patient and/or family verbalized understanding of discharge instructions and all questions answered.      Vicenta Boone RN

## 2025-06-11 ENCOUNTER — PATIENT OUTREACH (OUTPATIENT)
Dept: CARE COORDINATION | Facility: CLINIC | Age: 25
End: 2025-06-11

## 2025-06-11 ENCOUNTER — INFUSION THERAPY VISIT (OUTPATIENT)
Dept: TRANSPLANT | Facility: CLINIC | Age: 25
End: 2025-06-11
Attending: PEDIATRICS
Payer: MEDICAID

## 2025-06-11 ENCOUNTER — NURSE TRIAGE (OUTPATIENT)
Dept: ONCOLOGY | Facility: CLINIC | Age: 25
End: 2025-06-11

## 2025-06-11 VITALS
OXYGEN SATURATION: 92 % | HEART RATE: 104 BPM | RESPIRATION RATE: 16 BRPM | TEMPERATURE: 98.9 F | DIASTOLIC BLOOD PRESSURE: 82 MMHG | SYSTOLIC BLOOD PRESSURE: 126 MMHG

## 2025-06-11 DIAGNOSIS — G81.10 SPASTIC HEMIPLEGIA, UNSPECIFIED ETIOLOGY, UNSPECIFIED LATERALITY (H): ICD-10-CM

## 2025-06-11 DIAGNOSIS — D57.00 SICKLE CELL PAIN CRISIS (H): Primary | ICD-10-CM

## 2025-06-11 PROCEDURE — 96361 HYDRATE IV INFUSION ADD-ON: CPT

## 2025-06-11 PROCEDURE — 250N000011 HC RX IP 250 OP 636: Mod: JZ | Performed by: PEDIATRICS

## 2025-06-11 PROCEDURE — 250N000013 HC RX MED GY IP 250 OP 250 PS 637: Performed by: PEDIATRICS

## 2025-06-11 PROCEDURE — 258N000003 HC RX IP 258 OP 636: Performed by: PEDIATRICS

## 2025-06-11 PROCEDURE — 96376 TX/PRO/DX INJ SAME DRUG ADON: CPT

## 2025-06-11 PROCEDURE — 96374 THER/PROPH/DIAG INJ IV PUSH: CPT

## 2025-06-11 PROCEDURE — 96375 TX/PRO/DX INJ NEW DRUG ADDON: CPT

## 2025-06-11 RX ORDER — HEPARIN SODIUM (PORCINE) LOCK FLUSH IV SOLN 100 UNIT/ML 100 UNIT/ML
5 SOLUTION INTRAVENOUS
Status: DISCONTINUED | OUTPATIENT
Start: 2025-06-11 | End: 2025-06-11 | Stop reason: HOSPADM

## 2025-06-11 RX ORDER — HEPARIN SODIUM (PORCINE) LOCK FLUSH IV SOLN 100 UNIT/ML 100 UNIT/ML
5 SOLUTION INTRAVENOUS
OUTPATIENT
Start: 2025-08-01

## 2025-06-11 RX ORDER — ONDANSETRON 2 MG/ML
8 INJECTION INTRAMUSCULAR; INTRAVENOUS EVERY 6 HOURS PRN
Status: DISCONTINUED | OUTPATIENT
Start: 2025-06-11 | End: 2025-06-11 | Stop reason: HOSPADM

## 2025-06-11 RX ORDER — ONDANSETRON 2 MG/ML
8 INJECTION INTRAMUSCULAR; INTRAVENOUS EVERY 6 HOURS PRN
Start: 2025-08-01

## 2025-06-11 RX ORDER — DIPHENHYDRAMINE HCL 25 MG
50 CAPSULE ORAL
Status: COMPLETED | OUTPATIENT
Start: 2025-06-11 | End: 2025-06-11

## 2025-06-11 RX ORDER — KETOROLAC TROMETHAMINE 30 MG/ML
30 INJECTION, SOLUTION INTRAMUSCULAR; INTRAVENOUS ONCE
Start: 2025-08-01 | End: 2025-08-01

## 2025-06-11 RX ORDER — KETOROLAC TROMETHAMINE 30 MG/ML
30 INJECTION, SOLUTION INTRAMUSCULAR; INTRAVENOUS ONCE
Status: COMPLETED | OUTPATIENT
Start: 2025-06-11 | End: 2025-06-11

## 2025-06-11 RX ORDER — DIPHENHYDRAMINE HCL 25 MG
50 CAPSULE ORAL
Start: 2025-08-01

## 2025-06-11 RX ORDER — ONDANSETRON 4 MG/1
8 TABLET, FILM COATED ORAL
Start: 2025-08-01

## 2025-06-11 RX ORDER — HEPARIN SODIUM,PORCINE 10 UNIT/ML
5 VIAL (ML) INTRAVENOUS
OUTPATIENT
Start: 2025-08-01

## 2025-06-11 RX ADMIN — SODIUM CHLORIDE, SODIUM LACTATE, POTASSIUM CHLORIDE, AND CALCIUM CHLORIDE 1000 ML: .6; .31; .03; .02 INJECTION, SOLUTION INTRAVENOUS at 10:00

## 2025-06-11 RX ADMIN — HYDROMORPHONE HYDROCHLORIDE 1 MG: 1 INJECTION, SOLUTION INTRAMUSCULAR; INTRAVENOUS; SUBCUTANEOUS at 11:18

## 2025-06-11 RX ADMIN — ONDANSETRON 8 MG: 2 INJECTION INTRAMUSCULAR; INTRAVENOUS at 10:16

## 2025-06-11 RX ADMIN — HYDROMORPHONE HYDROCHLORIDE 1 MG: 1 INJECTION, SOLUTION INTRAMUSCULAR; INTRAVENOUS; SUBCUTANEOUS at 12:15

## 2025-06-11 RX ADMIN — DIPHENHYDRAMINE HYDROCHLORIDE 50 MG: 25 CAPSULE ORAL at 10:15

## 2025-06-11 RX ADMIN — HYDROMORPHONE HYDROCHLORIDE 1 MG: 1 INJECTION, SOLUTION INTRAMUSCULAR; INTRAVENOUS; SUBCUTANEOUS at 10:16

## 2025-06-11 RX ADMIN — HEPARIN SODIUM (PORCINE) LOCK FLUSH IV SOLN 100 UNIT/ML 5 ML: 100 SOLUTION at 13:22

## 2025-06-11 RX ADMIN — KETOROLAC TROMETHAMINE 30 MG: 30 INJECTION, SOLUTION INTRAMUSCULAR; INTRAVENOUS at 10:16

## 2025-06-11 ASSESSMENT — PAIN SCALES - GENERAL: PAINLEVEL_OUTOF10: SEVERE PAIN (8)

## 2025-06-11 NOTE — PROGRESS NOTES
Ambulatory Care Management- Transportation Support  Specialty SW - [facility]     Data/Intervention:  Patient Name:    Jennifer Cervantes     /Age: 2000 (25 year old)     C[facility] team member assisting Specialty SW with transportation request.      Assessment:  CHW/CTA scheduled on line with [employer] to arrange medical appointment transportation in conjunction with patient's insurance.     Taxi company: [employer]  Your reference number is [id number]    Patient will need to call above taxi company at phone number: [phone] when ready for return ride home.      Plan:  Patient is aware of the transportation plan.  available to assist with any other needs.      Lisandra Kilgore MA

## 2025-06-11 NOTE — TELEPHONE ENCOUNTER
Available appt with BMT at 0930 or 1000. Pt confirmed she is able to make appt.     Message sent to CCOD to assist with scheduling.

## 2025-06-11 NOTE — TELEPHONE ENCOUNTER
Available appt with [facility] at 0730. Call placed to pt to offer appt. Pt stating she would not be able to make that appt due to time it would take to get to clinic.

## 2025-06-11 NOTE — TELEPHONE ENCOUNTER
Call from pt, requesting assistance with transportation home from infusion today. She just got started, infusion lasts about 3 hours. She had a cab ride set up, but cancelled it yesterday because she did not have a confirmed appt.   [id number] request sent to SW to assist with transportation home and call pt directly to confirm.

## 2025-06-11 NOTE — NURSING NOTE
"Oncology Rooming Note    June 11, 2025 1:40 PM   Jennifer Cervantes is a 25 year old female who presents for:    Chief Complaint   Patient presents with    Infusion     Add on IV fluids and pain meds. History of sickle cell disease.     Initial Vitals: /82 (BP Location: Left arm, Patient Position: Sitting, Cuff Size: Adult Regular)   Pulse 104   Temp 98.9  F (37.2  C) (Oral)   Resp 16   SpO2 92%  Estimated body mass index is 25.79 kg/m  as calculated from the following:    Height as of 6/6/25: 1.651 m (5' 5\").    Weight as of 6/6/25: 70.3 kg (155 lb). There is no height or weight on file to calculate BSA.  Severe Pain (8) Comment: Data Unavailable   No LMP recorded. Patient has had an implant.  Allergies reviewed: Yes  Medications reviewed: Yes    Medications: Medication refills not needed today.  Pharmacy name entered into EPIC:    [facility] [place], [place] - [address] [place] [address] [id number]  [facility] - [place], [place] - [address]    Frailty Screening:   Is the patient here for a new oncology consult visit in cancer care? 2. No    PHQ9:  Did this patient require a PHQ9?: No      Clinical concerns: none       Jamaica Napoles RN              "

## 2025-06-11 NOTE — PROGRESS NOTES
"Social Work Note  [facility]    SW outreached to patient to inform her of a \"will call\" transportation ride scheduled on her behalf, once she completes infusion today, 6/11/25.  SW will remain available to support as needs arise.     CARLOS Escamilla, [facility]  Clinical , Adult Oncology  [facility]   [address] [place] [id number]  [email or web address]  Office Phone: [phone]  Support Groups at [facility]: Social Work Services for Cancer Patients ([email or web address])        "

## 2025-06-11 NOTE — TELEPHONE ENCOUNTER
Oncology Nurse Triage - Sickle Cell Pain Crisis:    Situation: Jennifer  calling about Sickle Cell Pain Crisis, requesting to be added on for IV fluids and pain medicine    Background:     Patient's last infusion was 06/09/25  Last clinic visit date:05/23/25 w/Patricia Mantilla  Does patient have active treatment plan? Yes    Assessment of Symptoms:  Onset/Duration of symptoms: 1 day    Is it typical sickle cell pain? Yes  Location: left leg and left arm  Character: ache  Intensity: 7/10    Any radiation of pain, numbness, tingling, weakness, warmth, swelling, discoloration of arms or legs?No    Fever? No    Chest Pain Present: No    Shortness of breath: No    Other home therapies tried: HEAT/HEATING PAD and WARM BATH     Last home medication taken and when: 10pm oxycodone    Any Refills Needed?: No    Does patient have transportation & length of time to get to clinic: Can get own transporation if before 11am. Will need assistance if after 11.    Recommendations:   Added to infusion wait list for IVF/pain meds per protocol.      If you do not hear from the infusion center by 2pm then you will not be able to get in for an infusion today. If symptoms worsen while waiting for call back, and/or you experience fever, chills, SOB, chest pain, cough, n/v, dizziness, numbness, swelling, discoloration of extremities, then seek emergency evaluation in Emergency Department.

## 2025-06-12 ENCOUNTER — HOSPITAL ENCOUNTER (EMERGENCY)
Facility: CLINIC | Age: 25
Discharge: HOME | End: 2025-06-12
Attending: FAMILY MEDICINE
Payer: MEDICAID

## 2025-06-12 VITALS
HEIGHT: 64 IN | WEIGHT: 150 LBS | TEMPERATURE: 98.3 F | RESPIRATION RATE: 14 BRPM | OXYGEN SATURATION: 98 % | SYSTOLIC BLOOD PRESSURE: 100 MMHG | BODY MASS INDEX: 25.61 KG/M2 | HEART RATE: 86 BPM | DIASTOLIC BLOOD PRESSURE: 62 MMHG

## 2025-06-12 DIAGNOSIS — D57.00 SICKLE CELL PAIN CRISIS (H): ICD-10-CM

## 2025-06-12 LAB
ANION GAP SERPL CALCULATED.3IONS-SCNC: 14 MMOL/L (ref 7–15)
BASOPHILS # BLD AUTO: 0.2 10E3/UL (ref 0–0.2)
BASOPHILS NFR BLD AUTO: 2 %
BUN SERPL-MCNC: 5.4 MG/DL (ref 6–20)
CALCIUM SERPL-MCNC: 8.7 MG/DL (ref 8.8–10.4)
CHLORIDE SERPL-SCNC: 104 MMOL/L (ref 98–107)
CREAT SERPL-MCNC: 0.54 MG/DL (ref 0.51–0.95)
EGFRCR SERPLBLD CKD-EPI 2021: >90 ML/MIN/1.73M2
EOSINOPHIL # BLD AUTO: 0.3 10E3/UL (ref 0–0.7)
EOSINOPHIL NFR BLD AUTO: 3 %
ERYTHROCYTE [DISTWIDTH] IN BLOOD BY AUTOMATED COUNT: 28 % (ref 10–15)
GLUCOSE SERPL-MCNC: 97 MG/DL (ref 70–99)
HCO3 SERPL-SCNC: 21 MMOL/L (ref 22–29)
HCT VFR BLD AUTO: 19 % (ref 35–47)
HGB BLD-MCNC: 6.8 G/DL (ref 11.7–15.7)
IMM GRANULOCYTES # BLD: 0 10E3/UL
IMM GRANULOCYTES NFR BLD: 0 %
LYMPHOCYTES # BLD AUTO: 2.5 10E3/UL (ref 0.8–5.3)
LYMPHOCYTES NFR BLD AUTO: 24 %
MCH RBC QN AUTO: 30.6 PG (ref 26.5–33)
MCHC RBC AUTO-ENTMCNC: 35.8 G/DL (ref 31.5–36.5)
MCV RBC AUTO: 86 FL (ref 78–100)
MONOCYTES # BLD AUTO: 1 10E3/UL (ref 0–1.3)
MONOCYTES NFR BLD AUTO: 10 %
NEUTROPHILS # BLD AUTO: 6.3 10E3/UL (ref 1.6–8.3)
NEUTROPHILS NFR BLD AUTO: 61 %
NRBC # BLD AUTO: 0.5 10E3/UL
NRBC BLD AUTO-RTO: 5 /100
PLATELET # BLD AUTO: 519 10E3/UL (ref 150–450)
POTASSIUM SERPL-SCNC: 4.2 MMOL/L (ref 3.4–5.3)
RBC # BLD AUTO: 2.22 10E6/UL (ref 3.8–5.2)
RETICS # AUTO: NORMAL 10*3/UL
RETICS/RBC NFR AUTO: NORMAL %
SODIUM SERPL-SCNC: 139 MMOL/L (ref 135–145)
WBC # BLD AUTO: 10.3 10E3/UL (ref 4–11)

## 2025-06-12 PROCEDURE — 85045 AUTOMATED RETICULOCYTE COUNT: CPT

## 2025-06-12 PROCEDURE — 96374 THER/PROPH/DIAG INJ IV PUSH: CPT | Mod: 59 | Performed by: FAMILY MEDICINE

## 2025-06-12 PROCEDURE — 99284 EMERGENCY DEPT VISIT MOD MDM: CPT | Mod: 25 | Performed by: FAMILY MEDICINE

## 2025-06-12 PROCEDURE — 36415 COLL VENOUS BLD VENIPUNCTURE: CPT

## 2025-06-12 PROCEDURE — 85025 COMPLETE CBC W/AUTO DIFF WBC: CPT

## 2025-06-12 PROCEDURE — 96376 TX/PRO/DX INJ SAME DRUG ADON: CPT | Mod: 59 | Performed by: FAMILY MEDICINE

## 2025-06-12 PROCEDURE — 80048 BASIC METABOLIC PNL TOTAL CA: CPT

## 2025-06-12 PROCEDURE — 96375 TX/PRO/DX INJ NEW DRUG ADDON: CPT | Mod: 59 | Performed by: FAMILY MEDICINE

## 2025-06-12 PROCEDURE — 96361 HYDRATE IV INFUSION ADD-ON: CPT | Performed by: FAMILY MEDICINE

## 2025-06-12 PROCEDURE — 250N000011 HC RX IP 250 OP 636

## 2025-06-12 PROCEDURE — 99285 EMERGENCY DEPT VISIT HI MDM: CPT | Mod: FS | Performed by: FAMILY MEDICINE

## 2025-06-12 PROCEDURE — 258N000003 HC RX IP 258 OP 636

## 2025-06-12 RX ORDER — KETOROLAC TROMETHAMINE 30 MG/ML
30 INJECTION, SOLUTION INTRAMUSCULAR; INTRAVENOUS ONCE
Status: COMPLETED | OUTPATIENT
Start: 2025-06-12 | End: 2025-06-12

## 2025-06-12 RX ORDER — ONDANSETRON 2 MG/ML
8 INJECTION INTRAMUSCULAR; INTRAVENOUS EVERY 30 MIN PRN
Status: COMPLETED | OUTPATIENT
Start: 2025-06-12 | End: 2025-06-12

## 2025-06-12 RX ORDER — SODIUM CHLORIDE, SODIUM LACTATE, POTASSIUM CHLORIDE, CALCIUM CHLORIDE 600; 310; 30; 20 MG/100ML; MG/100ML; MG/100ML; MG/100ML
INJECTION, SOLUTION INTRAVENOUS CONTINUOUS
Status: DISCONTINUED | OUTPATIENT
Start: 2025-06-12 | End: 2025-06-12 | Stop reason: HOSPADM

## 2025-06-12 RX ORDER — HEPARIN SODIUM (PORCINE) LOCK FLUSH IV SOLN 100 UNIT/ML 100 UNIT/ML
5 SOLUTION INTRAVENOUS ONCE
Status: DISCONTINUED | OUTPATIENT
Start: 2025-06-12 | End: 2025-06-12 | Stop reason: HOSPADM

## 2025-06-12 RX ADMIN — KETOROLAC TROMETHAMINE 30 MG: 30 INJECTION, SOLUTION INTRAMUSCULAR at 18:52

## 2025-06-12 RX ADMIN — HYDROMORPHONE HYDROCHLORIDE 1 MG: 1 INJECTION, SOLUTION INTRAMUSCULAR; INTRAVENOUS; SUBCUTANEOUS at 19:55

## 2025-06-12 RX ADMIN — ONDANSETRON 8 MG: 2 INJECTION INTRAMUSCULAR; INTRAVENOUS at 18:51

## 2025-06-12 RX ADMIN — HYDROMORPHONE HYDROCHLORIDE 1 MG: 1 INJECTION, SOLUTION INTRAMUSCULAR; INTRAVENOUS; SUBCUTANEOUS at 18:52

## 2025-06-12 RX ADMIN — SODIUM CHLORIDE, SODIUM LACTATE, POTASSIUM CHLORIDE, AND CALCIUM CHLORIDE: .6; .31; .03; .02 INJECTION, SOLUTION INTRAVENOUS at 18:51

## 2025-06-12 RX ADMIN — HYDROMORPHONE HYDROCHLORIDE 1 MG: 1 INJECTION, SOLUTION INTRAMUSCULAR; INTRAVENOUS; SUBCUTANEOUS at 21:00

## 2025-06-12 ASSESSMENT — ENCOUNTER SYMPTOMS: SICKLE CELL PAIN: 1

## 2025-06-12 ASSESSMENT — ACTIVITIES OF DAILY LIVING (ADL)
ADLS_ACUITY_SCORE: 58

## 2025-06-12 NOTE — ED TRIAGE NOTES
Patient reports has taken all her po meds and has maxed out on her doses but still having significant pain on her Left leg.      Triage Assessment (Adult)       Row Name 06/12/25 8977          Triage Assessment    Airway WDL WDL        Respiratory WDL    Respiratory WDL WDL        Skin Circulation/Temperature WDL    Skin Circulation/Temperature WDL WDL        Cardiac WDL    Cardiac WDL WDL        Peripheral/Neurovascular WDL    Peripheral Neurovascular WDL WDL        Cognitive/Neuro/Behavioral WDL    Cognitive/Neuro/Behavioral WDL WDL

## 2025-06-12 NOTE — ED PROVIDER NOTES
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 She reports that her pain is in her left leg and left arm. She took all of her at home pain medications 3 x 10mg oxycodone which typically resolves her pain but did not do so today. She reports compliance with her other medications. Patient reports this pain is similar to past sickle cell crises. She does not report any chest pain, shortness of breath, hemoptysis, URI symptoms. No nausea vomiting or diarrhea.     Past Medical History:   Diagnosis Date    Anxiety     Bleeding disorder     Blood clotting disorder     Cerebral infarction (H) 2015    Cognitive developmental delay     low IQ. 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 side (H)     right hand contractures    Iron overload due to repeated red blood cell transfusions     Migraines     Multiple subsegmental pulmonary emboli without acute cor pulmonale (H) 02/01/2021    Oppositional defiant behavior     Presence of intrauterine contraceptive device 2/18/2020    Superficial venous thrombosis of arm, right 03/25/2021    Uncomplicated asthma          Physical Exam   BP: 114/75  Pulse: 95  Temp: 98.3  F (36.8  C)  Resp: 16  Height: 162.6 cm (5' 4\")  Weight: 68 kg (150 lb)  SpO2: 94 %  Physical Exam  Constitutional:       General: She is not in acute distress.     Appearance: Normal appearance. She is not " There is already order for labs in the system from 1/17/17 office visit which includes CBC but Ricky Davis indicated for patient to get those done in 6 months, about mid-July.  Ricky Davis can patient get the labs ordered on 1/17/17 before June 13th visit diaphoretic.   HENT:      Head: Atraumatic.      Mouth/Throat:      Mouth: Mucous membranes are moist.   Eyes:      General: No scleral icterus.     Conjunctiva/sclera: Conjunctivae normal.   Cardiovascular:      Rate and Rhythm: Normal rate.      Heart sounds: Normal heart sounds.   Pulmonary:      Effort: No respiratory distress.      Breath sounds: Normal breath sounds.   Abdominal:      General: Abdomen is flat.   Musculoskeletal:      Cervical back: Neck supple.   Skin:     General: Skin is warm.      Findings: No rash.   Neurological:      Mental Status: She is alert.           ED Course, Procedures, & Data     ED Course as of 06/12/25 2114   Thu Jun 12, 2025 2107 Reticulocyte count     Procedures              Results for orders placed or performed during the hospital encounter of 06/12/25   Basic metabolic panel     Status: Abnormal   Result Value Ref Range    Sodium 139 135 - 145 mmol/L    Potassium 4.2 3.4 - 5.3 mmol/L    Chloride 104 98 - 107 mmol/L    Carbon Dioxide (CO2) 21 (L) 22 - 29 mmol/L    Anion Gap 14 7 - 15 mmol/L    Urea Nitrogen 5.4 (L) 6.0 - 20.0 mg/dL    Creatinine 0.54 0.51 - 0.95 mg/dL    GFR Estimate >90 >60 mL/min/1.73m2    Calcium 8.7 (L) 8.8 - 10.4 mg/dL    Glucose 97 70 - 99 mg/dL   Reticulocyte count     Status: None   Result Value Ref Range    % Reticulocyte      Absolute Reticulocyte      Narrative    Interfering substance present that may interfere with reticulocyte quantification. Recommend peripheral smear for pathologist review if clinically indicated.   CBC with platelets and differential     Status: Abnormal   Result Value Ref Range    WBC Count 10.3 4.0 - 11.0 10e3/uL    RBC Count 2.22 (L) 3.80 - 5.20 10e6/uL    Hemoglobin 6.8 (LL) 11.7 - 15.7 g/dL    Hematocrit 19.0 (L) 35.0 - 47.0 %    MCV 86 78 - 100 fL    MCH 30.6 26.5 - 33.0 pg    MCHC 35.8 31.5 - 36.5 g/dL    RDW 28.0 (H) 10.0 - 15.0 %    Platelet Count 519 (H) 150 - 450 10e3/uL    % Neutrophils 61 %    % Lymphocytes  24 %    % Monocytes 10 %    % Eosinophils 3 %    % Basophils 2 %    % Immature Granulocytes 0 %    NRBCs per 100 WBC 5 (H) <1 /100    Absolute Neutrophils 6.3 1.6 - 8.3 10e3/uL    Absolute Lymphocytes 2.5 0.8 - 5.3 10e3/uL    Absolute Monocytes 1.0 0.0 - 1.3 10e3/uL    Absolute Eosinophils 0.3 0.0 - 0.7 10e3/uL    Absolute Basophils 0.2 0.0 - 0.2 10e3/uL    Absolute Immature Granulocytes 0.0 <=0.4 10e3/uL    Absolute NRBCs 0.5 10e3/uL   CBC with platelets differential     Status: Abnormal    Narrative    The following orders were created for panel order CBC with platelets differential.  Procedure                               Abnormality         Status                     ---------                               -----------         ------                     CBC with platelets and ...[[id number]]  Abnormal            Final result                 Please view results for these tests on the individual orders.     *Note: Due to a large number of results and/or encounters for the requested time period, some results have not been displayed. A complete set of results can be found in Results Review.     Medications   lactated ringers infusion (0 mLs Intravenous Stopped 6/12/25 2108)   HYDROmorphone (DILAUDID) injection 1 mg (1 mg Intravenous $Given 6/12/25 2100)   ketorolac (TORADOL) injection 30 mg (30 mg Intravenous $Given 6/12/25 1852)   ondansetron (ZOFRAN) injection 8 mg (8 mg Intravenous $Given 6/12/25 1851)     Labs Ordered and Resulted from Time of ED Arrival to Time of ED Departure   BASIC METABOLIC PANEL - Abnormal       Result Value    Sodium 139      Potassium 4.2      Chloride 104      Carbon Dioxide (CO2) 21 (*)     Anion Gap 14      Urea Nitrogen 5.4 (*)     Creatinine 0.54      GFR Estimate >90      Calcium 8.7 (*)     Glucose 97     CBC WITH PLATELETS AND DIFFERENTIAL - Abnormal    WBC Count 10.3      RBC Count 2.22 (*)     Hemoglobin 6.8 (*)     Hematocrit 19.0 (*)     MCV 86      MCH 30.6      MCHC 35.8    "   RDW 28.0 (*)     Platelet Count 519 (*)     % Neutrophils 61      % Lymphocytes 24      % Monocytes 10      % Eosinophils 3      % Basophils 2      % Immature Granulocytes 0      NRBCs per 100 WBC 5 (*)     Absolute Neutrophils 6.3      Absolute Lymphocytes 2.5      Absolute Monocytes 1.0      Absolute Eosinophils 0.3      Absolute Basophils 0.2      Absolute Immature Granulocytes 0.0      Absolute NRBCs 0.5     RETICULOCYTE COUNT    % Reticulocyte        Absolute Reticulocyte         No orders to display          Critical care was not performed.     Medical Decision Making  The patient's presentation was of moderate complexity (a chronic illness mild to moderate exacerbation, progression, or side effect of treatment).    The patient's evaluation involved:  ordering and/or review of 3+ test(s) in this encounter (see separate area of note for details)    The patient's management necessitated high risk (a parenteral controlled substance).    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 Vitals in the ED stable. Physical exam unremarkable. Initial differential diagnosis includes but not limited to sickle cell pain crisis, msk pain, other. Nursing notes reviewed.    CBC hemoglobin 6.8 consistent with baseline. BMP mildly reduced bicarb at 21. Reticulocyte count lab error due to \"interfering substance\". Patient had labs drawn 3 and 6 days prior with stable results historically. She is not presenting with concerning symptoms today and we do not feel redrawing this lab is necessary.     In the ED, the patient's symptoms were managed with ED care plan, with improvement in symptoms upon reassessment.   Dilaudid 1 mg IV q1h up to 3 doses  Toradol 30 mg " IV x1   Maintenance IV fluids with LR  Other: Zofran 8 mg IV PRN nausea    Patient's pain stabilized after third dose of dilaudid. She is requesting discharge and was encouraged to return to the ED if she has new or worsening symptoms or any other urgent health concerns. The complete clinical picture is most consistent with sickle cell pain crisis. After counseling on the diagnosis, work-up, and treatment plan, the patient was discharged to home. The patient was advised to follow-up with PCP as needed. Patient seen and discussed with attending physician Dr. Rush and we are in agreement with the plan of care.      Final diagnoses:   Sickle cell pain crisis (H)     New Prescriptions    No medications on file     --  Jitendra Hughes PA-C   Emergency Medicine   [facility] EMERGENCY DEPARTMENT  6/12/2025      I have reviewed the nursing notes. I have reviewed the findings, diagnosis, plan and need for follow up with the patient.    New Prescriptions    No medications on file       Final diagnoses:   Sickle cell pain crisis (H)         [facility] EMERGENCY DEPARTMENT  6/12/2025     Jitendra Hughes  06/12/25 1711

## 2025-06-13 NOTE — DISCHARGE INSTRUCTIONS
Instructions from your doctor today:  Emergency Department (ED) testing is focused on the potential causes of your symptoms that are the most dangerous possibilities, and cannot cover every possibility. Based on the evaluation, it was deemed sufficiently safe to discharge and continue management through the clinics. Thus, follow-up is very important to assess for improvement/worsening, potential further testing, and potential treatment adjustments. If you were given opioid pain medications or other medications that can make you drowsy while in the ED, you should not drive for at least several hours and not until you feel completely back to normal.     Please make an appointment to follow up with:  - Your Primary Care Provider in 5-7 days as needed.  - If you do not have a primary care provider, you can be seen in follow-up and establish care by calling any of the clinics below:     - [facility] (phone: [phone])     - [facility] (phone: [phone])   - Have your clinic provider review the results from today's visit with you again, including any potential follow-up or additional testing that may be needed based on the results. Occasionally, incidental findings are found on later review by radiologists that may need follow-up.     Return to the Emergency Department immediately if you have worsening symptoms, or any other urgent health concerns.

## 2025-06-16 ENCOUNTER — INFUSION THERAPY VISIT (OUTPATIENT)
Dept: INFUSION THERAPY | Facility: CLINIC | Age: 25
End: 2025-06-16
Attending: NURSE PRACTITIONER
Payer: MEDICAID

## 2025-06-16 ENCOUNTER — NURSE TRIAGE (OUTPATIENT)
Dept: ONCOLOGY | Facility: CLINIC | Age: 25
End: 2025-06-16
Payer: MEDICAID

## 2025-06-16 ENCOUNTER — PATIENT OUTREACH (OUTPATIENT)
Dept: CARE COORDINATION | Facility: CLINIC | Age: 25
End: 2025-06-16
Payer: MEDICAID

## 2025-06-16 VITALS
TEMPERATURE: 98.5 F | HEART RATE: 98 BPM | OXYGEN SATURATION: 94 % | SYSTOLIC BLOOD PRESSURE: 109 MMHG | DIASTOLIC BLOOD PRESSURE: 76 MMHG | RESPIRATION RATE: 18 BRPM

## 2025-06-16 DIAGNOSIS — G81.10 SPASTIC HEMIPLEGIA, UNSPECIFIED ETIOLOGY, UNSPECIFIED LATERALITY (H): ICD-10-CM

## 2025-06-16 DIAGNOSIS — D57.00 SICKLE CELL DISEASE WITH CRISIS (H): ICD-10-CM

## 2025-06-16 DIAGNOSIS — D57.00 SICKLE CELL PAIN CRISIS (H): Primary | ICD-10-CM

## 2025-06-16 PROCEDURE — 258N000003 HC RX IP 258 OP 636: Performed by: PEDIATRICS

## 2025-06-16 PROCEDURE — 96361 HYDRATE IV INFUSION ADD-ON: CPT

## 2025-06-16 PROCEDURE — 250N000013 HC RX MED GY IP 250 OP 250 PS 637: Performed by: PEDIATRICS

## 2025-06-16 PROCEDURE — 250N000011 HC RX IP 250 OP 636: Performed by: PEDIATRICS

## 2025-06-16 PROCEDURE — 96376 TX/PRO/DX INJ SAME DRUG ADON: CPT

## 2025-06-16 PROCEDURE — 96374 THER/PROPH/DIAG INJ IV PUSH: CPT

## 2025-06-16 PROCEDURE — 96375 TX/PRO/DX INJ NEW DRUG ADDON: CPT

## 2025-06-16 RX ORDER — HEPARIN SODIUM (PORCINE) LOCK FLUSH IV SOLN 100 UNIT/ML 100 UNIT/ML
5 SOLUTION INTRAVENOUS
OUTPATIENT
Start: 2025-08-01

## 2025-06-16 RX ORDER — HEPARIN SODIUM,PORCINE 10 UNIT/ML
5 VIAL (ML) INTRAVENOUS
OUTPATIENT
Start: 2025-08-01

## 2025-06-16 RX ORDER — DIPHENHYDRAMINE HCL 25 MG
50 CAPSULE ORAL
Status: CANCELLED
Start: 2025-08-01

## 2025-06-16 RX ORDER — KETOROLAC TROMETHAMINE 30 MG/ML
30 INJECTION, SOLUTION INTRAMUSCULAR; INTRAVENOUS ONCE
Status: CANCELLED
Start: 2025-08-01 | End: 2025-08-01

## 2025-06-16 RX ORDER — HEPARIN SODIUM (PORCINE) LOCK FLUSH IV SOLN 100 UNIT/ML 100 UNIT/ML
5 SOLUTION INTRAVENOUS
Status: DISCONTINUED | OUTPATIENT
Start: 2025-06-16 | End: 2025-06-16 | Stop reason: HOSPADM

## 2025-06-16 RX ORDER — ONDANSETRON 4 MG/1
8 TABLET, FILM COATED ORAL
Start: 2025-08-01

## 2025-06-16 RX ORDER — ONDANSETRON 2 MG/ML
8 INJECTION INTRAMUSCULAR; INTRAVENOUS EVERY 6 HOURS PRN
Status: CANCELLED
Start: 2025-08-01

## 2025-06-16 RX ORDER — OXYCODONE HYDROCHLORIDE 10 MG/1
10 TABLET ORAL EVERY 6 HOURS PRN
Qty: 12 TABLET | Refills: 0 | Status: SHIPPED | OUTPATIENT
Start: 2025-06-16

## 2025-06-16 RX ORDER — DIPHENHYDRAMINE HCL 25 MG
50 CAPSULE ORAL
Status: COMPLETED | OUTPATIENT
Start: 2025-06-16 | End: 2025-06-16

## 2025-06-16 RX ORDER — KETOROLAC TROMETHAMINE 30 MG/ML
30 INJECTION, SOLUTION INTRAMUSCULAR; INTRAVENOUS ONCE
Status: COMPLETED | OUTPATIENT
Start: 2025-06-16 | End: 2025-06-16

## 2025-06-16 RX ORDER — ONDANSETRON 2 MG/ML
8 INJECTION INTRAMUSCULAR; INTRAVENOUS EVERY 6 HOURS PRN
Status: DISCONTINUED | OUTPATIENT
Start: 2025-06-16 | End: 2025-06-16 | Stop reason: HOSPADM

## 2025-06-16 RX ADMIN — HYDROMORPHONE HYDROCHLORIDE 1 MG: 1 INJECTION, SOLUTION INTRAMUSCULAR; INTRAVENOUS; SUBCUTANEOUS at 13:11

## 2025-06-16 RX ADMIN — SODIUM CHLORIDE, SODIUM LACTATE, POTASSIUM CHLORIDE, AND CALCIUM CHLORIDE 1000 ML: .6; .31; .03; .02 INJECTION, SOLUTION INTRAVENOUS at 13:09

## 2025-06-16 RX ADMIN — HYDROMORPHONE HYDROCHLORIDE 1 MG: 1 INJECTION, SOLUTION INTRAMUSCULAR; INTRAVENOUS; SUBCUTANEOUS at 14:10

## 2025-06-16 RX ADMIN — KETOROLAC TROMETHAMINE 30 MG: 30 INJECTION, SOLUTION INTRAMUSCULAR; INTRAVENOUS at 13:16

## 2025-06-16 RX ADMIN — DIPHENHYDRAMINE HYDROCHLORIDE 50 MG: 25 CAPSULE ORAL at 13:00

## 2025-06-16 RX ADMIN — ONDANSETRON 8 MG: 2 INJECTION INTRAMUSCULAR; INTRAVENOUS at 13:10

## 2025-06-16 RX ADMIN — Medication 5 ML: at 15:12

## 2025-06-16 RX ADMIN — HYDROMORPHONE HYDROCHLORIDE 1 MG: 1 INJECTION, SOLUTION INTRAMUSCULAR; INTRAVENOUS; SUBCUTANEOUS at 15:09

## 2025-06-16 ASSESSMENT — PAIN SCALES - GENERAL: PAINLEVEL_OUTOF10: SEVERE PAIN (10)

## 2025-06-16 NOTE — TELEPHONE ENCOUNTER
Reached out to [facility] as it was brought to triage's attention that they may have some openings this afternoon.     They do have a 1pm openings.     Call placed to Jennifer to let her know that I can get her in at [facility] at 1pm.   She will take that appointment.    Message sent to [employer] Workers to help arrange transportation to [facility].

## 2025-06-16 NOTE — PROGRESS NOTES
Infusion Nursing Note:  Jennifer Cervantes presents today for IVF/pain meds.    Patient seen by provider today: No   present during visit today: Not Applicable.    Note: Pt rated her left arm/leg pain 10 out of 10. Pain decreased to 5 upon completion of infusion.       Intravenous Access:  Implanted Port.    Treatment Conditions:  Not Applicable.      Post Infusion Assessment:  Patient tolerated infusion without incident.  Blood return noted pre and post infusion.  Site patent and intact, free from redness, edema or discomfort.  Access discontinued per protocol.       Discharge Plan:   Discharge instructions reviewed with: Patient.  Patient and/or family verbalized understanding of discharge instructions and all questions answered.  AVS to patient via [employer].  Patient discharged in stable condition accompanied by: self.  Departure Mode: Ambulatory.      Heather Jj RN

## 2025-06-16 NOTE — PROGRESS NOTES
Ambulatory Care Management- Transportation Support  Specialty SW - [facility]     Data/Intervention:  Patient Name:    Jennifer Cervantes     /Age: 2000 (25 year old)     CCRC team member assisting Specialty SW with transportation request.      Assessment:  CHW/CTA scheduled online with [employer] to arrange medical appointment transportation in conjunction with patient's insurance.     Taxi company: [employer]  Your reference number is [id number] Booking # is [id number]    Patient will need to call above taxi company at phone number: [phone] when ready for return ride home.      Plan:  Patient is aware of the transportation plan.  available to assist with any other needs.      Lisandra Kilgore MA

## 2025-06-16 NOTE — TELEPHONE ENCOUNTER
Oncology Nurse Triage - Sickle Cell Pain Crisis:    Situation: Jennifer  calling about Sickle Cell Pain Crisis, requesting to be added on for IV fluids and pain medicine    Background:     Patient's last infusion was 6/11/25  Last clinic visit date:5/23/25 Patricia Mantilla   Does patient have active treatment plan? Yes    Assessment of Symptoms:  Onset/Duration of symptoms: 3 day    Is it typical sickle cell pain? Yes  Location: left arm and left leg   Character: Sharp and Cramping  Intensity: 10/10    Any radiation of pain, numbness, tingling, weakness, warmth, swelling, discoloration of arms or legs?No    Fever? No  (if yes max temperature recorded in last 24 hours):      Chest Pain Present: No    Shortness of breath: No    Other home therapies tried: HEAT/HEATING PAD and WARM BATH     Last home medication taken and when: oxycodone yesterday at 10pm     Any Refills Needed?: Yes oxycodone     Does patient have transportation & length of time to get to clinic: Yes if hears something in next 30 mins otherwise needs transportation       Recommendations:     Placed on infusion call list     If you do not hear from the infusion center by 2pm then you will not be able to get in for an infusion today. If symptoms worsen while waiting for call back, and/or you experience fever, chills, SOB, chest pain, cough, n/v, dizziness, numbness, swelling, discoloration of extremities, then seek emergency evaluation in Emergency Department.     Please note, if you are late for your appt, you risk losing your infusion appt as it may delay another patient's infusion who arrived on time.

## 2025-06-16 NOTE — TELEPHONE ENCOUNTER
Narcotic Refill Request    Medication(s) requested:  Oxycodone   Person Requesting Refill:adelaide   What pain is the medication treating: sickle cell pain   How is the medication being taken?:takes as needed   Does pt have enough for today? All out of medication   Is pain being adequately controlled on the current regimen?: yes   Experiencing any side effects from medication?: No     Date of most recent appointment:  5/23/25 Patricia johnson   Any No Show Visits:NO   Next appointment:   7/18/25 Patricia johnson   Last fill date and by whom:  6/9/25 Patricia johnson    Reviewed: No access     Send to provider: DR Duncan and Arely Krueger

## 2025-06-17 ENCOUNTER — HOSPITAL ENCOUNTER (EMERGENCY)
Facility: CLINIC | Age: 25
Discharge: HOME | End: 2025-06-18
Attending: INTERNAL MEDICINE | Admitting: INTERNAL MEDICINE
Payer: MEDICAID

## 2025-06-17 ENCOUNTER — PRE VISIT (OUTPATIENT)
Dept: GASTROENTEROLOGY | Facility: CLINIC | Age: 25
End: 2025-06-17

## 2025-06-17 DIAGNOSIS — D57.1 HB-SS DISEASE WITHOUT CRISIS (H): ICD-10-CM

## 2025-06-17 DIAGNOSIS — D57.00 SICKLE CELL PAIN CRISIS (H): ICD-10-CM

## 2025-06-17 LAB
ALBUMIN SERPL BCG-MCNC: 4.5 G/DL (ref 3.5–5.2)
ALBUMIN UR-MCNC: NEGATIVE MG/DL
ALP SERPL-CCNC: 57 U/L (ref 40–150)
ALT SERPL W P-5'-P-CCNC: 41 U/L (ref 0–50)
ANION GAP SERPL CALCULATED.3IONS-SCNC: 12 MMOL/L (ref 7–15)
APPEARANCE UR: CLEAR
AST SERPL W P-5'-P-CCNC: 63 U/L (ref 0–45)
BACTERIA #/AREA URNS HPF: ABNORMAL /HPF
BASOPHILS # BLD AUTO: 0.1 10E3/UL (ref 0–0.2)
BASOPHILS NFR BLD AUTO: 1 %
BILIRUB SERPL-MCNC: 3.2 MG/DL
BILIRUB UR QL STRIP: NEGATIVE
BUN SERPL-MCNC: 5.3 MG/DL (ref 6–20)
CALCIUM SERPL-MCNC: 8.4 MG/DL (ref 8.8–10.4)
CHLORIDE SERPL-SCNC: 105 MMOL/L (ref 98–107)
COLOR UR AUTO: YELLOW
CREAT SERPL-MCNC: 0.52 MG/DL (ref 0.51–0.95)
EGFRCR SERPLBLD CKD-EPI 2021: >90 ML/MIN/1.73M2
EOSINOPHIL # BLD AUTO: 0.3 10E3/UL (ref 0–0.7)
EOSINOPHIL NFR BLD AUTO: 2 %
ERYTHROCYTE [DISTWIDTH] IN BLOOD BY AUTOMATED COUNT: 23.9 % (ref 10–15)
GLUCOSE SERPL-MCNC: 107 MG/DL (ref 70–99)
GLUCOSE UR STRIP-MCNC: NEGATIVE MG/DL
HCG SERPL QL: NEGATIVE
HCO3 SERPL-SCNC: 21 MMOL/L (ref 22–29)
HCT VFR BLD AUTO: 15.6 % (ref 35–47)
HGB BLD-MCNC: 5.7 G/DL (ref 11.7–15.7)
HGB UR QL STRIP: NEGATIVE
HYALINE CASTS: 1 /LPF
IMM GRANULOCYTES # BLD: 0.1 10E3/UL
IMM GRANULOCYTES NFR BLD: 1 %
KETONES UR STRIP-MCNC: NEGATIVE MG/DL
LEUKOCYTE ESTERASE UR QL STRIP: NEGATIVE
LYMPHOCYTES # BLD AUTO: 2.5 10E3/UL (ref 0.8–5.3)
LYMPHOCYTES NFR BLD AUTO: 23 %
MCH RBC QN AUTO: 30.2 PG (ref 26.5–33)
MCHC RBC AUTO-ENTMCNC: 36.5 G/DL (ref 31.5–36.5)
MCV RBC AUTO: 83 FL (ref 78–100)
MONOCYTES # BLD AUTO: 0.9 10E3/UL (ref 0–1.3)
MONOCYTES NFR BLD AUTO: 9 %
MUCOUS THREADS #/AREA URNS LPF: PRESENT /LPF
NEUTROPHILS # BLD AUTO: 7.1 10E3/UL (ref 1.6–8.3)
NEUTROPHILS NFR BLD AUTO: 64 %
NITRATE UR QL: NEGATIVE
NRBC # BLD AUTO: 0.2 10E3/UL
NRBC BLD AUTO-RTO: 2 /100
PH UR STRIP: 5.5 [PH] (ref 5–7)
PLATELET # BLD AUTO: 414 10E3/UL (ref 150–450)
POTASSIUM SERPL-SCNC: 4.2 MMOL/L (ref 3.4–5.3)
PROT SERPL-MCNC: 7.4 G/DL (ref 6.4–8.3)
RBC # BLD AUTO: 1.89 10E6/UL (ref 3.8–5.2)
RBC URINE: 2 /HPF
SODIUM SERPL-SCNC: 138 MMOL/L (ref 135–145)
SP GR UR STRIP: 1.01 (ref 1–1.03)
SQUAMOUS EPITHELIAL: <1 /HPF
UROBILINOGEN UR STRIP-MCNC: NORMAL MG/DL
WBC # BLD AUTO: 11 10E3/UL (ref 4–11)
WBC URINE: 2 /HPF

## 2025-06-17 PROCEDURE — 85045 AUTOMATED RETICULOCYTE COUNT: CPT | Performed by: INTERNAL MEDICINE

## 2025-06-17 PROCEDURE — 86923 COMPATIBILITY TEST ELECTRIC: CPT | Performed by: PEDIATRICS

## 2025-06-17 PROCEDURE — 81001 URINALYSIS AUTO W/SCOPE: CPT | Performed by: INTERNAL MEDICINE

## 2025-06-17 PROCEDURE — 99284 EMERGENCY DEPT VISIT MOD MDM: CPT | Mod: 25 | Performed by: INTERNAL MEDICINE

## 2025-06-17 PROCEDURE — 86901 BLOOD TYPING SEROLOGIC RH(D): CPT | Performed by: REGISTERED NURSE

## 2025-06-17 PROCEDURE — 82310 ASSAY OF CALCIUM: CPT | Performed by: INTERNAL MEDICINE

## 2025-06-17 PROCEDURE — 36415 COLL VENOUS BLD VENIPUNCTURE: CPT | Performed by: INTERNAL MEDICINE

## 2025-06-17 PROCEDURE — 84703 CHORIONIC GONADOTROPIN ASSAY: CPT | Performed by: INTERNAL MEDICINE

## 2025-06-17 PROCEDURE — 96376 TX/PRO/DX INJ SAME DRUG ADON: CPT | Performed by: INTERNAL MEDICINE

## 2025-06-17 PROCEDURE — 250N000011 HC RX IP 250 OP 636: Mod: JZ | Performed by: INTERNAL MEDICINE

## 2025-06-17 PROCEDURE — 99284 EMERGENCY DEPT VISIT MOD MDM: CPT | Performed by: INTERNAL MEDICINE

## 2025-06-17 PROCEDURE — 258N000003 HC RX IP 258 OP 636: Performed by: INTERNAL MEDICINE

## 2025-06-17 PROCEDURE — 96375 TX/PRO/DX INJ NEW DRUG ADDON: CPT | Performed by: INTERNAL MEDICINE

## 2025-06-17 PROCEDURE — 85004 AUTOMATED DIFF WBC COUNT: CPT | Performed by: INTERNAL MEDICINE

## 2025-06-17 PROCEDURE — 96361 HYDRATE IV INFUSION ADD-ON: CPT | Performed by: INTERNAL MEDICINE

## 2025-06-17 PROCEDURE — 96374 THER/PROPH/DIAG INJ IV PUSH: CPT | Performed by: INTERNAL MEDICINE

## 2025-06-17 RX ORDER — ONDANSETRON 2 MG/ML
8 INJECTION INTRAMUSCULAR; INTRAVENOUS ONCE
Status: COMPLETED | OUTPATIENT
Start: 2025-06-17 | End: 2025-06-17

## 2025-06-17 RX ORDER — KETOROLAC TROMETHAMINE 30 MG/ML
30 INJECTION, SOLUTION INTRAMUSCULAR; INTRAVENOUS ONCE
Status: COMPLETED | OUTPATIENT
Start: 2025-06-17 | End: 2025-06-17

## 2025-06-17 RX ADMIN — SODIUM CHLORIDE, SODIUM LACTATE, POTASSIUM CHLORIDE, AND CALCIUM CHLORIDE 1000 ML: .6; .31; .03; .02 INJECTION, SOLUTION INTRAVENOUS at 20:04

## 2025-06-17 RX ADMIN — ONDANSETRON 8 MG: 2 INJECTION INTRAMUSCULAR; INTRAVENOUS at 20:05

## 2025-06-17 RX ADMIN — KETOROLAC TROMETHAMINE 30 MG: 30 INJECTION, SOLUTION INTRAMUSCULAR at 20:05

## 2025-06-17 RX ADMIN — HYDROMORPHONE HYDROCHLORIDE 1 MG: 1 INJECTION, SOLUTION INTRAMUSCULAR; INTRAVENOUS; SUBCUTANEOUS at 21:08

## 2025-06-17 RX ADMIN — HYDROMORPHONE HYDROCHLORIDE 1 MG: 1 INJECTION, SOLUTION INTRAMUSCULAR; INTRAVENOUS; SUBCUTANEOUS at 20:05

## 2025-06-17 RX ADMIN — HYDROMORPHONE HYDROCHLORIDE 1 MG: 1 INJECTION, SOLUTION INTRAMUSCULAR; INTRAVENOUS; SUBCUTANEOUS at 22:57

## 2025-06-17 ASSESSMENT — ACTIVITIES OF DAILY LIVING (ADL)
ADLS_ACUITY_SCORE: 58

## 2025-06-17 NOTE — ED PROVIDER NOTES
[facility] EMERGENCY DEPARTMENT ([facility])    6/17/25      ED PROVIDER NOTE   History     Chief Complaint   Patient presents with    Sickle Cell Pain Crisis     HPI  Jennifer Cervantes is a 25 year old female with a history of homozygous sickle mutation with ED care plan, acute on chronic abdominal pain, CVA (at [age] years of age), and PE who presents to the ED for sickle cell pain crisis. Patient reports severe pain since earlier today with pain localized in abdomen, arms, legs, and back. Patient notes the recent variance in weather could have exacerbated patient's sickle cell disease. Patient tolerates care plan meds well. Patient endorses she has IV port access. She does not report any chest pain, shortness of breath, hemoptysis, URI symptoms. No nausea vomiting or diarrhea.             Physical Exam   BP: 125/79  Pulse: 101  Temp: 99.1  F (37.3  C)  Resp: 18  SpO2: 94 %  Physical Exam  Vitals and nursing note reviewed.   Constitutional:       General: She is not in acute distress.     Appearance: She is not diaphoretic.   HENT:      Head: Normocephalic and atraumatic.   Eyes:      Extraocular Movements: Extraocular movements intact.      Conjunctiva/sclera: Conjunctivae normal.   Cardiovascular:      Rate and Rhythm: Normal rate and regular rhythm.      Heart sounds: Normal heart sounds. No murmur heard.     No friction rub. No gallop.   Pulmonary:      Effort: Pulmonary effort is normal. No respiratory distress.      Breath sounds: Normal breath sounds. No stridor. No wheezing, rhonchi or rales.   Chest:      Chest wall: No tenderness.   Abdominal:      General: Abdomen is flat. Bowel sounds are normal. There is no distension.      Palpations: Abdomen is soft. There is no mass.      Tenderness: There is no abdominal tenderness. There is no right CVA tenderness, left CVA tenderness, guarding or rebound.      Hernia: No hernia is present.   Musculoskeletal:         General: No tenderness. Normal range of  motion.      Cervical back: Normal range of motion and neck supple.   Skin:     General: Skin is warm.      Findings: No rash.   Neurological:      General: No focal deficit present.      Cranial Nerves: No cranial nerve deficit.      Motor: No weakness.           ED Course, Procedures, & Data      Procedures            Results for orders placed or performed during the hospital encounter of 06/17/25   Comprehensive metabolic panel     Status: Abnormal   Result Value Ref Range    Sodium 138 135 - 145 mmol/L    Potassium 4.2 3.4 - 5.3 mmol/L    Carbon Dioxide (CO2) 21 (L) 22 - 29 mmol/L    Anion Gap 12 7 - 15 mmol/L    Urea Nitrogen 5.3 (L) 6.0 - 20.0 mg/dL    Creatinine 0.52 0.51 - 0.95 mg/dL    GFR Estimate >90 >60 mL/min/1.73m2    Calcium 8.4 (L) 8.8 - 10.4 mg/dL    Chloride 105 98 - 107 mmol/L    Glucose 107 (H) 70 - 99 mg/dL    Alkaline Phosphatase 57 40 - 150 U/L    AST 63 (H) 0 - 45 U/L    ALT 41 0 - 50 U/L    Protein Total 7.4 6.4 - 8.3 g/dL    Albumin 4.5 3.5 - 5.2 g/dL    Bilirubin Total 3.2 (H) <=1.2 mg/dL   Reticulocyte count     Status: None   Result Value Ref Range    % Reticulocyte      Absolute Reticulocyte     HCG qualitative Blood     Status: Normal   Result Value Ref Range    hCG Serum Qualitative Negative Negative   UA with Microscopic reflex to Culture     Status: Abnormal    Specimen: Urine, Clean Catch   Result Value Ref Range    Color Urine Yellow Colorless, Straw, Light Yellow, Yellow    Appearance Urine Clear Clear    Glucose Urine Negative Negative mg/dL    Bilirubin Urine Negative Negative    Ketones Urine Negative Negative mg/dL    Specific Gravity Urine 1.009 1.003 - 1.035    Blood Urine Negative Negative    pH Urine 5.5 5.0 - 7.0    Protein Albumin Urine Negative Negative mg/dL    Urobilinogen Urine Normal Normal mg/dL    Nitrite Urine Negative Negative    Leukocyte Esterase Urine Negative Negative    Bacteria Urine Few (A) None Seen /HPF    Mucus Urine Present (A) None Seen /LPF    RBC  Urine 2 <=2 /HPF    WBC Urine 2 <=5 /HPF    Squamous Epithelials Urine <1 <=1 /HPF    Hyaline Casts Urine 1 <=2 /LPF    Narrative    Urine Culture not indicated   CBC with platelets and differential     Status: Abnormal   Result Value Ref Range    WBC Count 11.0 4.0 - 11.0 10e3/uL    RBC Count 1.89 (L) 3.80 - 5.20 10e6/uL    Hemoglobin 5.7 (LL) 11.7 - 15.7 g/dL    Hematocrit 15.6 (L) 35.0 - 47.0 %    MCV 83 78 - 100 fL    MCH 30.2 26.5 - 33.0 pg    MCHC 36.5 31.5 - 36.5 g/dL    RDW 23.9 (H) 10.0 - 15.0 %    Platelet Count 414 150 - 450 10e3/uL    % Neutrophils 64 %    % Lymphocytes 23 %    % Monocytes 9 %    % Eosinophils 2 %    % Basophils 1 %    % Immature Granulocytes 1 %    NRBCs per 100 WBC 2 (H) <1 /100    Absolute Neutrophils 7.1 1.6 - 8.3 10e3/uL    Absolute Lymphocytes 2.5 0.8 - 5.3 10e3/uL    Absolute Monocytes 0.9 0.0 - 1.3 10e3/uL    Absolute Eosinophils 0.3 0.0 - 0.7 10e3/uL    Absolute Basophils 0.1 0.0 - 0.2 10e3/uL    Absolute Immature Granulocytes 0.1 <=0.4 10e3/uL    Absolute NRBCs 0.2 10e3/uL   CBC with platelets differential     Status: Abnormal    Narrative    The following orders were created for panel order CBC with platelets differential.  Procedure                               Abnormality         Status                     ---------                               -----------         ------                     CBC with platelets and ...[[id number]]  Abnormal            Final result                 Please view results for these tests on the individual orders.     *Note: Due to a large number of results and/or encounters for the requested time period, some results have not been displayed. A complete set of results can be found in Results Review.     Medications   HYDROmorphone (DILAUDID) injection 1 mg (1 mg Intravenous $Given 6/17/25 2257)   ketorolac (TORADOL) injection 30 mg (30 mg Intravenous $Given 6/17/25 2005)   ondansetron (ZOFRAN) injection 8 mg (8 mg Intravenous $Given 6/17/25 2005)    lactated ringers BOLUS 1,000 mL (0 mLs Intravenous Stopped 6/17/25 6841)     Labs Ordered and Resulted from Time of ED Arrival to Time of ED Departure   COMPREHENSIVE METABOLIC PANEL - Abnormal       Result Value    Sodium 138      Potassium 4.2      Carbon Dioxide (CO2) 21 (*)     Anion Gap 12      Urea Nitrogen 5.3 (*)     Creatinine 0.52      GFR Estimate >90      Calcium 8.4 (*)     Chloride 105      Glucose 107 (*)     Alkaline Phosphatase 57      AST 63 (*)     ALT 41      Protein Total 7.4      Albumin 4.5      Bilirubin Total 3.2 (*)    ROUTINE UA WITH MICROSCOPIC REFLEX TO CULTURE - Abnormal    Color Urine Yellow      Appearance Urine Clear      Glucose Urine Negative      Bilirubin Urine Negative      Ketones Urine Negative      Specific Gravity Urine 1.009      Blood Urine Negative      pH Urine 5.5      Protein Albumin Urine Negative      Urobilinogen Urine Normal      Nitrite Urine Negative      Leukocyte Esterase Urine Negative      Bacteria Urine Few (*)     Mucus Urine Present (*)     RBC Urine 2      WBC Urine 2      Squamous Epithelials Urine <1      Hyaline Casts Urine 1     CBC WITH PLATELETS AND DIFFERENTIAL - Abnormal    WBC Count 11.0      RBC Count 1.89 (*)     Hemoglobin 5.7 (*)     Hematocrit 15.6 (*)     MCV 83      MCH 30.2      MCHC 36.5      RDW 23.9 (*)     Platelet Count 414      % Neutrophils 64      % Lymphocytes 23      % Monocytes 9      % Eosinophils 2      % Basophils 1      % Immature Granulocytes 1      NRBCs per 100 WBC 2 (*)     Absolute Neutrophils 7.1      Absolute Lymphocytes 2.5      Absolute Monocytes 0.9      Absolute Eosinophils 0.3      Absolute Basophils 0.1      Absolute Immature Granulocytes 0.1      Absolute NRBCs 0.2     HCG QUALITATIVE PREGNANCY - Normal    hCG Serum Qualitative Negative     RETICULOCYTE COUNT    % Reticulocyte        Absolute Reticulocyte         No orders to display          Critical care was not performed.     Medical Decision Making  The  patient's presentation was of moderate complexity (a chronic illness mild to moderate exacerbation, progression, or side effect of treatment).    The patient's evaluation involved:  ordering and/or review of 3+ test(s) in this encounter (see separate area of note for details)    The patient's management necessitated moderate risk (prescription drug management including medications given in the ED).    Assessment & Plan    Sickle cell pain crisis uncomplicated, no suggestion of acute chest, Hgb low baseline, followed the care plan with relief and wish to be discharged and follow up with her PMD Heme.    I have reviewed the nursing notes. I have reviewed the findings, diagnosis, plan and need for follow up with the patient.    Discharge Medication List as of 6/18/2025 12:27 AM          Final diagnoses:   Sickle cell pain crisis (H)   IDennis Her, am serving as a trained medical scribe to document services personally performed by Judson Tse Md, MD, based on the provider's statements to me.     IJudson Md, MD, was physically present and have reviewed and verified the accuracy of this note documented by Dennis Greenwood.     Judson Tse MD  [facility] EMERGENCY DEPARTMENT  6/17/2025     Judson Tse MD  06/18/25 0126

## 2025-06-18 ENCOUNTER — NURSE TRIAGE (OUTPATIENT)
Dept: ONCOLOGY | Facility: CLINIC | Age: 25
End: 2025-06-18
Payer: MEDICAID

## 2025-06-18 ENCOUNTER — TELEPHONE (OUTPATIENT)
Dept: INTERNAL MEDICINE | Facility: CLINIC | Age: 25
End: 2025-06-18
Payer: MEDICAID

## 2025-06-18 VITALS
DIASTOLIC BLOOD PRESSURE: 64 MMHG | OXYGEN SATURATION: 90 % | TEMPERATURE: 99.1 F | SYSTOLIC BLOOD PRESSURE: 99 MMHG | HEART RATE: 83 BPM | RESPIRATION RATE: 17 BRPM

## 2025-06-18 DIAGNOSIS — D57.1 HB-SS DISEASE WITHOUT CRISIS (H): Primary | ICD-10-CM

## 2025-06-18 LAB
ABO + RH BLD: NORMAL
BLD GP AB SCN SERPL QL: NEGATIVE
BLD PROD TYP BPU: NORMAL
BLOOD COMPONENT TYPE: NORMAL
CODING SYSTEM: NORMAL
CROSSMATCH: NORMAL
ISSUE DATE AND TIME: NORMAL
RETICS # AUTO: NORMAL 10*3/UL
RETICS/RBC NFR AUTO: NORMAL %
SPECIMEN EXP DATE BLD: NORMAL
UNIT ABO/RH: NORMAL
UNIT NUMBER: NORMAL
UNIT STATUS: NORMAL
UNIT TYPE ISBT: 5100

## 2025-06-18 RX ORDER — HEPARIN SODIUM,PORCINE 10 UNIT/ML
5 VIAL (ML) INTRAVENOUS
OUTPATIENT
Start: 2025-06-18

## 2025-06-18 RX ORDER — HEPARIN SODIUM (PORCINE) LOCK FLUSH IV SOLN 100 UNIT/ML 100 UNIT/ML
5 SOLUTION INTRAVENOUS
OUTPATIENT
Start: 2025-06-18

## 2025-06-18 NOTE — TELEPHONE ENCOUNTER
[facility] Prior Authorization Team Request    Medication:  Trelegy Ellipta 100-62.5-25 AEPB  Dosing: Inhale 1 puff into the lungs daily  NDC (required for Medicaid members): [address]-71  Insurance Company: [place] 340B  BIN: [id number]  PCN: [id number]  [id number]  ID: [id number]  CoverMyMeds Key (if applicable):   Additional documentation:   Pharmacy Filling the Rx:  [facility]  Filling Pharmacy Phone:  [phone]  Contact: [facility] ([id number]) please send all responses to this pool.  Pharmacy NPI (required for Medicaid members):[id number]

## 2025-06-18 NOTE — ED NOTES
Patient Verbalized understanding of discharge instructions including medication administration and recommended follow up care as noted on discharge instructions. Written discharge instructions given, denies any further questions. Patient alert and oriented, able to ambulate independently, agreeable to discharge.

## 2025-06-18 NOTE — TELEPHONE ENCOUNTER
6/18/2025      Sven Givens  [address]  [place] [address]      Dear Colleague,    Thank you for referring your patient, Sven Givens, to the [facility]. Please see a copy of my visit note below.    Oncology Follow up visit:  Date on this visit: 6/18/25       DIAGNOSIS  Stage 1A (pT1aN0) 0.9 x 0.7 x 0.5 cm grade 1 well differentiated adenocarcinoma of the left lower lobe of the lung with invasive component being 0.3cm, s/p wedge resection and mediastinal LN sampling on 9/23/16.  3 sampled LNs were negative. Margins were all negative and there was no ALI or PNI present.      History Of Present Illness:    Please see previous notes for details.    I have been following him for stage I A grade 1 well differentiated adenocarcinoma of the left lower lobe of the lung for which he had surgery in September 2016.  He has been recurrence free from that aspect.         On 9/23/2022 he had surveillance colonoscopy for personal history of adenomatous polyps.  He was noted to have a polypoid nonobstructing medium-sized mass in the ascending colon.  It was not circumferential.  It measured 3 cm in length.  Its diameter measured 20 mm.  This was biopsied.  Tattoo was placed.  There was diverticulosis in the sigmoid colon, descending colon and in the transverse colon.  The biopsy from this showed moderately differentiated fragments of adenocarcinoma.    CEA was 2.2.  CT chest abdomen and pelvis on 9/29/2022 did not show evidence of metastatic disease.  Showed segmental ascending colon thickening compatible with known colon cancer.  No significant abdominal lymphadenopathy noted.  No evidence of lung cancer recurrence.    On 10/28/2022 he had laparoscopic extended right hemicolectomy, partial omentectomy.  Final pathology showed 2.6 cm moderately differentiated adenocarcinoma arising from tubular adenoma.  Tumor invades the submucosa.  There was lymphovascular invasion but no  Called patient back to inform her that there were not any openings for IVF and pain meds. Patient verbalized understanding and stated that she reached out and discussed with her care coordinator. Patient stated she would try to call tomorrow morning to schedule appointment. RN informed patient that she should go to the emergency room if her pain becomes unbearable overnight. Patient verbalized understanding. RN also informed patient that prescriptions had been called into the pharmacy.    perineural invasion.  All 27 lymph nodes were benign.  All margins were free of dysplasia and invasive carcinoma.  It was pT1pN0 lesion.     I did not recommend adjuvant chemotherapy and recommended serial observation.    Interval history  He is here with his wife.  Overall he is doing well with mild fatigue.  No nausea vomiting diarrhea constipation or bleeding.  He mentions that he had a respiratory infection last month when he was traveling but now it is much better.  He did not have fevers.  Breathing is fine.      ECOG 0    ROS:  Otherwise a comprehensive review of the system was unremarkable.         I reviewed the other history in Epic as below.     Past Medical/Surgical History:  Past Medical History:   Diagnosis Date     Allergies     PCN, ACE, Indomethocin     Cancer (H)     small cell lung cancer stage I     Diabetes (H) 2002     Diabetic neuropathy (H) 5/7/2012     Hypertension      Kidney stones 09/2004    s/p right kidney basket retrieval     Rhinitis, allergic      Rosacea      Soft tissue disorder related to use, overuse, and pressure 10/30/2015     Varicose veins      Looking back, he has had normochromic normocytic anemia at least since 2012.  He had iron studies done for it previously and they were pretty much unremarkable except TIBC was elevated, although his most recent ferritin was normal in March.     July 2019 he had greenlight ablation of the prostate gland for BPH.      He had EGD in April 2019 which showed reflux esophagitis.  A couple of gastric polyps were removed which were benign.        Past Surgical History:   Procedure Laterality Date     BIOPSY  23 Sep 2016     BRONCHOSCOPY FLEXIBLE N/A 09/23/2016    Procedure: BRONCHOSCOPY FLEXIBLE;  Surgeon: Gayle Moya MD;  Location:  OR     COLONOSCOPY  05/01/2003     COLONOSCOPY N/A 09/23/2022    Procedure: COLONOSCOPY, WITH POLYPECTOMY AND BIOPSY;  Surgeon: Abbe Mccarty MD;  Location:  OR     COLONOSCOPY N/A 09/27/2023     Procedure: Colonoscopy;  Surgeon: Rosy Schofield MD;  Location: [facility]     COLONOSCOPY WITH CO2 INSUFFLATION N/A 05/31/2017    Procedure: COLONOSCOPY WITH CO2 INSUFFLATION;  COLON SCREEN/ SEB;  Surgeon: Margarito Rasheed DO;  Location: [facility]     COLONOSCOPY WITH CO2 INSUFFLATION N/A 09/23/2022    Procedure: COLONOSCOPY, WITH CO2 INSUFFLATION;  Surgeon: Abbe Mccarty MD;  Location: [facility]     COMBINED ESOPHAGOSCOPY, GASTROSCOPY, DUODENOSCOPY (EGD) WITH CO2 INSUFFLATION N/A 04/19/2019    Procedure: COMBINED ESOPHAGOSCOPY, GASTROSCOPY, DUODENOSCOPY (EGD) WITH CO2 INSUFFLATION;  Surgeon: Abbe Mccarty MD;  Location: [facility]     ESOPHAGOSCOPY, GASTROSCOPY, DUODENOSCOPY (EGD), COMBINED N/A 04/19/2019    Procedure: Combined Esophagoscopy, Gastroscopy, Duodenoscopy (Egd), Biopsy Single Or Multiple;  Surgeon: Abbe Mccarty MD;  Location: [facility]     GENITOURINARY SURGERY      kidney stones     HERNIA REPAIR  May 2023     THORACOSCOPIC WEDGE RESECTION LUNG Left 09/23/2016    Procedure: THORACOSCOPIC WEDGE RESECTION LUNG;  Surgeon: Gayle Moya MD;  Location: [facility]     VASCULAR SURGERY      varicose vein     Cancer History:   As above    Allergies:  Allergies as of 06/18/2025 - Reviewed 06/13/2025   Allergen Reaction Noted     Pcn [penicillins] Rash 11/09/2009     Ace inhibitors Cough 10/23/2010     Indomethacin Other (See Comments) 12/20/2013     Invokana [canagliflozin]  07/16/2019     Current Medications:  Current Outpatient Medications   Medication Sig Dispense Refill     allopurinol (ZYLOPRIM) 100 MG tablet TAKE 2 TABLETS BY MOUTH EVERY  tablet 3     atorvastatin (LIPITOR) 80 MG tablet Take 1 tablet (80 mg) by mouth daily. 90 tablet 3     blood glucose (CONTOUR NEXT TEST) test strip USE TO TEST BLOOD SUGAR 1 TIME DAILY OR AS DIRECTED. 100 strip 2     cinnamon 500 MG CAPS Take 500 mg by mouth 2 times daily.       citalopram (CELEXA) 20 MG tablet Take 1 tablet (20 mg)  by mouth daily. For anxiety. 90 tablet 3     clotrimazole-betamethasone (LOTRISONE) 1-0.05 % external cream Apply topically 2 times daily.       COMPRESSION STOCKINGS Please measure and distribute 2 pair of 15mmHg - 20mmHg knee high open or closed toe compression stockings; customized compresssion; with extra refills as indicated or what insurance will allow . 3 each 2     Continuous Glucose Sensor (FREESTYLE FEDERICO 3 PLUS SENSOR) MISC Use 1 sensor every 15 days. Use to read blood sugars per 's instructions. 6 each 3     cyanocobalamin (VITAMIN B-12) 1000 MCG tablet Take 1,000 mcg by mouth daily       diclofenac (VOLTAREN) 1 % topical gel 1 g to affected area on the left foot 2-3 times a day as needed for pain. 100 g 2     fish oil-omega-3 fatty acids 1000 MG capsule Take 2 g by mouth 2 times daily.       glimepiride (AMARYL) 4 MG tablet Take 1 tablet (4 mg) by mouth 2 times daily. (Patient taking differently: Take 4 mg by mouth every morning (before breakfast). And take 2 mg at night) 180 tablet 3     losartan-hydrochlorothiazide (HYZAAR) 100-25 MG tablet Take 1 tablet by mouth every morning. For blood pressure. 90 tablet 3     metFORMIN (GLUCOPHAGE) 1000 MG tablet TAKE 1 TABLET BY MOUTH TWICE A DAY WITH FOOD 180 tablet 3     metoprolol succinate ER (TOPROL XL) 50 MG 24 hr tablet Take 1 tablet (50 mg) by mouth daily. 90 tablet 3     [facility] Natural Products (OSTEO BI-FLEX TRIPLE STRENGTH) TABS Take 1 tablet by mouth daily.       nitroGLYcerin (NITROSTAT) 0.4 MG sublingual tablet For chest pain place 1 tablet under the tongue every 5 minutes for 3 doses. If symptoms persist 5 minutes after 1st dose call 911. 25 tablet 3     omeprazole (PRILOSEC) 20 MG DR capsule Take 1 capsule (20 mg) by mouth 2 times daily. 180 capsule 3     Semaglutide, 2 MG/DOSE, (OZEMPIC) 8 MG/3ML pen Inject 2 mg subcutaneously every 7 days. 12 mL 2     senna-docusate (SENEXON-S) 8.6-50 MG tablet Take 1 tablet by mouth at bedtime. 90  tablet 2     terazosin (HYTRIN) 5 MG capsule TAKE 1 CAPSULE (5 MG) BY MOUTH AT BEDTIME FOR BLOOD PRESSURE. 90 capsule 3      Family History:  Family History   Problem Relation Age of Onset     Hernia Mother          age 97     Cerebrovascular Disease Father 64     Cancer Sister         ovarary      Other Cancer Sister      Obesity Sister      Deep Vein Thrombosis (DVT) No family hx of      Social History:  Social History     Socioeconomic History     Marital status:      Spouse name: Not on file     Number of children: Not on file     Years of education: Not on file     Highest education level: Not on file   Occupational History     Employer: [employer]   Tobacco Use     Smoking status: Former     Current packs/day: 0.00     Average packs/day: 0.5 packs/day for 1 year (0.5 ttl pk-yrs)     Types: Cigarettes     Quit date: 1992     Years since quittin.4     Smokeless tobacco: Never     Tobacco comments:     15 + years    Vaping Use     Vaping status: Never Used   Substance and Sexual Activity     Alcohol use: No     Drug use: No     Sexual activity: Not Currently     Partners: Female     Birth control/protection: None   Other Topics Concern     Parent/sibling w/ CABG, MI or angioplasty before 65F 55M? No   Social History Narrative     Not on file     Social Drivers of Health     Financial Resource Strain: Low Risk  (2025)    Financial Resource Strain      Within the past 12 months, have you or your family members you live with been unable to get utilities (heat, electricity) when it was really needed?: No   Food Insecurity: Low Risk  (2025)    Food Insecurity      Within the past 12 months, did you worry that your food would run out before you got money to buy more?: No      Within the past 12 months, did the food you bought just not last and you didn t have money to get more?: No   Transportation Needs: Low Risk  (2025)    Transportation Needs      Within the past 12 months,  has lack of transportation kept you from medical appointments, getting your medicines, non-medical meetings or appointments, work, or from getting things that you need?: No   Physical Activity: Insufficiently Active (1/2/2025)    Exercise Vital Sign      Days of Exercise per Week: 3 days      Minutes of Exercise per Session: 40 min   Stress: Patient Declined (1/2/2025)    [employer] [place] [employer] - Occupational Stress Questionnaire      Feeling of Stress : Patient declined   Social Connections: Unknown (1/2/2025)    Social Connection and Isolation Panel [NHANES]      Frequency of Communication with Friends and Family: Not on file      Frequency of Social Gatherings with Friends and Family: Twice a week      Attends Buddhism Services: Not on file      Active Member of Clubs or Organizations: Not on file      Attends Club or Organization Meetings: Not on file      Marital Status: Not on file   Interpersonal Safety: Low Risk  (1/7/2025)    Interpersonal Safety      Do you feel physically and emotionally safe where you currently live?: Yes      Within the past 12 months, have you been hit, slapped, kicked or otherwise physically hurt by someone?: No      Within the past 12 months, have you been humiliated or emotionally abused in other ways by your partner or ex-partner?: No   Housing Stability: Low Risk  (1/2/2025)    Housing Stability      Do you have housing? : Yes      Are you worried about losing your housing?: No     He said he was never a heavy smoker.  He used to smoke a few cigarettes from his college days up until 1992, when he completely quit.  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  He used to live 200 miles away from [place] when it exploded and he was living there for 5 more years after that, so he thinks he did have some radiation exposure.  He denies any alcohol use.  Currently he works at [employer].  He lives with his wife.       Physical Exam:  There were no vitals taken for this  visit.   Wt Readings from Last 4 Encounters:   02/28/25 95.1 kg (209 lb 9.6 oz)   02/21/25 95.3 kg (210 lb)   02/20/25 96.2 kg (212 lb 1.6 oz)   01/07/25 95.6 kg (210 lb 12.8 oz)     CONSTITUTIONAL: No apparent distress  EYES: PERRLA, without pallor or jaundice  ENT/MOUTH: Ears unremarkable. No oral lesions  CVS: s1s2 normal  RESPIRATORY: Chest is clear  GI: Abdomen is benign  NEURO: Alert and oriented ×3  INTEGUMENT: no concerning skin rashes   LYMPHATIC: no palpable lymphadenopathy  MUSCULOSKELETAL: Unremarkable. No bony tenderness.   EXTREMITIES: no pedal edema  PSYCH: Mentation, mood and affect are appropriate        Laboratory/Imaging Studies  Reviewed  6/13/2025  CBC shows WBC 13.6.  Hemoglobin 12.  Platelets 206.  ANC 8.7.    Chemistry was unremarkable    CEA 4.3.    PSA was 0.71 on 1/7/2025      CEA was 2.2 on 9/23/2022    CT chest with contrast 6/13/2025.  I reviewed it myself.  COMPARISON: Chest CT 2/25/2025, PET/CT 3/6/2025     HISTORY: re-evaluate LLL lung nodule; hx colon and lung cancer;  Solitary pulmonary nodule     FINDINGS:     Chest:   Mediastinum:   Unremarkable thyroid. Unremarkable esophagus. Normal heart size.  Mild-moderate coronary calcifications. No significant pericardial  effusion. Thoracic aortic caliber within normal limits. Pulmonary  artery caliber within normal limits. No suspicious thoracic lymph  nodes.     Lungs:    No pleural effusion. No pneumothorax. Patent tracheobronchial tree.  Left lower lobectomy. 0.7 cm nodular thickening along the superior  posterior portion of the resection margin, unchanged and previously  not FDG avid on 3/6/2025 (4/145).        Abdomen:  Examination of the upper abdomen is limited. Diffuse hepatic  hypoattenuation.     Bones:   No suspicious osseous lesion. Degenerative changes of the thoracic  spine.     Soft Tissues:  No suspicious mass.                                                                      IMPRESSION:   1. Nodular thickening along  the superior portion of the left lower  lobe wedge resection margin unchanged since 12/10/2024 and not FDG  avid on PET/CT 3/6/2025. Given stability, lung findings could  represent postsurgical change/scarring, however, continued  surveillance is recommended.  2. Hepatic steatosis.    PET/CT 3/6/2025  COMPARISON: CTs from 2/25/2025, 12/10/2024, and 9/29/2022      PET/CT FINDINGS: Diffuse colonic FDG activity from metformin effect. All other FDG activity is normal. No lymphadenopathy. Normal appearance of the left lower lobe wedge resection with no evidence of local recurrence.      CT FINDINGS: Moderate calcified atherosclerosis, including three-vessel coronary disease. Duodenal diverticulum. Bilateral kidney cysts, requiring no follow-up. Small nonobstructing left kidney stone. Mildly enlarged prostate. Pelvic phleboliths.   Scattered colonic diverticula. Right hemicolectomy. Diastasis recti with nonobstructed loops of small bowel in the bulge. Calcified injection granulomas in the buttocks. Mild mucosal thickening in the maxillary sinuses. Mild degenerative change in the   spine.                                                                       IMPRESSION:     No evidence of disease         Colonoscopy on 9/27/2023 did not show any evidence of recurrence.  There were internal hemorrhoids.  No specimens were collected.      ASSESSMENT/PLAN:    Stage I moderately differentiated colon adenocarcinoma.  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  There was lymphovascular invasion present.  All 27 lymph nodes were negative.  All margins were negative.    We discussed that he has a stage I colon cancer and does not require adjuvant chemotherapy.    I recommended serial observation.  He had repeat colonoscopy in September 2023 which was unremarkable.  Next 1 will be due in 2028.  I will check CEA in 6 months.    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   MLH1 promoter methylation is  positive.    MLH1 promoter methylation is typically associated with sporadic microsatellite unstable tumors of the colon/rectum or endometrium. MLH1 promoter methylation is distinctly uncommon in Hansen Syndrome-associated cancers, although rare cases of MLH1 methylation in patients with Hansen Syndrome have been reported.           Stage 1A (pT1aN0) well differentiated adenocarcinoma of the left lower lobe of the lung s/p wedge resection and mediastinal LN sampling on 9/23/16.  CT chest without evidence of recurrence on 6/13/2025.  He does have nodular thickening along the superior portion of the left lower lobe wedge resection margin which was noted in December 2024 but then a PET scan was done which did not show that it was FDG avid and since then it has remained stable.  Because this was kind of a new finding in 2024, I would recommend to keep a watch on this and repeat CT chest in 6 months.      Anemia.  Previous workup showed mild erythropoietin deficiency.  Hemoglobin is 12.  Continue to observe.        We did not address the following today.  BPH/problems urinating.  He will continue to follow with his urologist at [facility].      I will see him back in 6 months with labs/CT scan prior      I answered all of his questions to his satisfaction.  He agrees with the plan.        Lay Valencia MD      The longitudinal plan of care for the colon cancer and lung cancer, as documented were addressed during this visit. Due to the added complexity in care, I will continue to support Sven in the subsequent management and with ongoing continuity of care.            Again, thank you for allowing me to participate in the care of your patient.        Sincerely,        Lay Valencia MD    Electronically signed

## 2025-06-18 NOTE — TELEPHONE ENCOUNTER
Oncology Nurse Triage    Situation:   Jennifer reporting the following symptoms:  - went into ED for stomach pain and had blood raw    Background:   Treating Provider:   Dr. Duncan    Date of last office visit: Patricia Mantilla on 5/23/2025     Recent Treatments:sickle cell patient.     Assessment:     Onset: Labs done in ED on 6/17 Hgb 5.7    Current blood therapy states 1 unit RBC if HGB is less than 7 and if pt has symptoms of fatigue, shortness of breath.     Pt states has been sleeping all day and fatigued. Has SOB with activity     Denies fever, chest pain.     Recommendations:     1140 Per Dr. Duncan, plan on transfusion patient per blood therapy plan. If pt calls in tomorrow requesting pain medications with blood, we should not give IVF too. Dr. Duncan will amend pt blood plan to reflect that.     1203 This writer offered for blood transfusion appt for tomorrow 6/19 at 8:00am for Blood transfusion. Pt is aware still needs to call in morning of if feels needs sickle cell pain meds added on to appts. Pt was instructed to call into clinic in morning prior to planned appt to do sickle cell crisis assessment. Pt instructed and aware needs to get own ride for blood transfusion appt for tomorrow 6/19 since preplanned.     1210 Scheduling request sent to add on appt for tomorrow 6/19/2025 at 8:00am.

## 2025-06-18 NOTE — DISCHARGE INSTRUCTIONS
Please make an appointment to follow up with Your Hematologist and Your Primary Care Provider as soon as possible even if entirely better.

## 2025-06-19 ENCOUNTER — INFUSION THERAPY VISIT (OUTPATIENT)
Dept: TRANSPLANT | Facility: CLINIC | Age: 25
End: 2025-06-19
Attending: PEDIATRICS
Payer: MEDICAID

## 2025-06-19 VITALS
TEMPERATURE: 97.8 F | OXYGEN SATURATION: 90 % | RESPIRATION RATE: 18 BRPM | HEART RATE: 87 BPM | SYSTOLIC BLOOD PRESSURE: 125 MMHG | DIASTOLIC BLOOD PRESSURE: 78 MMHG

## 2025-06-19 DIAGNOSIS — D57.1 HB-SS DISEASE WITHOUT CRISIS (H): Primary | ICD-10-CM

## 2025-06-19 DIAGNOSIS — D57.00 SICKLE CELL PAIN CRISIS (H): ICD-10-CM

## 2025-06-19 DIAGNOSIS — G81.10 SPASTIC HEMIPLEGIA, UNSPECIFIED ETIOLOGY, UNSPECIFIED LATERALITY (H): ICD-10-CM

## 2025-06-19 DIAGNOSIS — M79.642 PAIN OF LEFT HAND: Primary | ICD-10-CM

## 2025-06-19 DIAGNOSIS — Z86.73 HISTORY OF STROKE: ICD-10-CM

## 2025-06-19 PROCEDURE — P9016 RBC LEUKOCYTES REDUCED: HCPCS | Performed by: PEDIATRICS

## 2025-06-19 PROCEDURE — 250N000013 HC RX MED GY IP 250 OP 250 PS 637: Performed by: PEDIATRICS

## 2025-06-19 PROCEDURE — 250N000011 HC RX IP 250 OP 636: Performed by: PEDIATRICS

## 2025-06-19 RX ORDER — ONDANSETRON 4 MG/1
8 TABLET, FILM COATED ORAL
Start: 2025-08-01

## 2025-06-19 RX ORDER — HEPARIN SODIUM,PORCINE 10 UNIT/ML
5 VIAL (ML) INTRAVENOUS
OUTPATIENT
Start: 2025-08-01

## 2025-06-19 RX ORDER — HEPARIN SODIUM (PORCINE) LOCK FLUSH IV SOLN 100 UNIT/ML 100 UNIT/ML
5 SOLUTION INTRAVENOUS
OUTPATIENT
Start: 2025-08-01

## 2025-06-19 RX ORDER — KETOROLAC TROMETHAMINE 30 MG/ML
30 INJECTION, SOLUTION INTRAMUSCULAR; INTRAVENOUS ONCE
Start: 2025-08-01 | End: 2025-08-01

## 2025-06-19 RX ORDER — DIPHENHYDRAMINE HCL 25 MG
50 CAPSULE ORAL
Status: COMPLETED | OUTPATIENT
Start: 2025-06-19 | End: 2025-06-19

## 2025-06-19 RX ORDER — KETOROLAC TROMETHAMINE 30 MG/ML
30 INJECTION, SOLUTION INTRAMUSCULAR; INTRAVENOUS ONCE
Status: COMPLETED | OUTPATIENT
Start: 2025-06-19 | End: 2025-06-19

## 2025-06-19 RX ORDER — HEPARIN SODIUM (PORCINE) LOCK FLUSH IV SOLN 100 UNIT/ML 100 UNIT/ML
5 SOLUTION INTRAVENOUS ONCE
Status: COMPLETED | OUTPATIENT
Start: 2025-06-19 | End: 2025-06-19

## 2025-06-19 RX ORDER — ONDANSETRON 2 MG/ML
8 INJECTION INTRAMUSCULAR; INTRAVENOUS EVERY 6 HOURS PRN
Status: DISCONTINUED | OUTPATIENT
Start: 2025-06-19 | End: 2025-06-19 | Stop reason: HOSPADM

## 2025-06-19 RX ORDER — DIPHENHYDRAMINE HCL 25 MG
50 CAPSULE ORAL
Start: 2025-08-01

## 2025-06-19 RX ORDER — ONDANSETRON 2 MG/ML
8 INJECTION INTRAMUSCULAR; INTRAVENOUS EVERY 6 HOURS PRN
Start: 2025-08-01

## 2025-06-19 RX ADMIN — HYDROMORPHONE HYDROCHLORIDE 1 MG: 1 INJECTION, SOLUTION INTRAMUSCULAR; INTRAVENOUS; SUBCUTANEOUS at 11:06

## 2025-06-19 RX ADMIN — HYDROMORPHONE HYDROCHLORIDE 1 MG: 1 INJECTION, SOLUTION INTRAMUSCULAR; INTRAVENOUS; SUBCUTANEOUS at 08:59

## 2025-06-19 RX ADMIN — ONDANSETRON 8 MG: 2 INJECTION INTRAMUSCULAR; INTRAVENOUS at 08:59

## 2025-06-19 RX ADMIN — KETOROLAC TROMETHAMINE 30 MG: 30 INJECTION, SOLUTION INTRAMUSCULAR; INTRAVENOUS at 08:59

## 2025-06-19 RX ADMIN — HYDROMORPHONE HYDROCHLORIDE 1 MG: 1 INJECTION, SOLUTION INTRAMUSCULAR; INTRAVENOUS; SUBCUTANEOUS at 10:00

## 2025-06-19 RX ADMIN — DIPHENHYDRAMINE HYDROCHLORIDE 50 MG: 25 CAPSULE ORAL at 08:59

## 2025-06-19 RX ADMIN — Medication 5 ML: at 11:12

## 2025-06-19 ASSESSMENT — PAIN SCALES - GENERAL: PAINLEVEL_OUTOF10: SEVERE PAIN (8)

## 2025-06-19 NOTE — TELEPHONE ENCOUNTER
Central Prior Authorization Team   Phone: [phone]    PA Initiation    Medication: Trelegy Ellipta 100-62.5-25 AEPB  Insurance Company: [employer] for [place] Medicaid Phone [phone] Fax [phone]  Pharmacy Filling the Rx: [facility] [place], [place] - [address] [place] [address] [phone]  Filling Pharmacy Phone: [phone]  Filling Pharmacy Fax:    Start Date: 6/19/2025

## 2025-06-19 NOTE — PROGRESS NOTES
Infusion Nursing Note:  Jennifer Cervantes presents today for scheduled infusion.    Patient seen by provider today: No   present during visit today: Not Applicable.    Note: Patient received one unit RBC per blood plan. Dilaudid x3, toradol, benadryl, and zofran given per therapy plan for 8/10 generalized and new L hand pain with some relief. Dr Duncan contacted regarding new sharp pain when moving L hand; x-ray ordered and scheduled for tomorrow 6/20; patient aware.      Intravenous Access:  Implanted Port.    Treatment Conditions:  Results reviewed, labs MET treatment parameters, ok to proceed with treatment.      Post Infusion Assessment:  Patient tolerated infusion without incident.       Discharge Plan:   Patient and/or family verbalized understanding of discharge instructions and all questions answered.      Vicenta Boone RN

## 2025-06-20 ENCOUNTER — HOSPITAL ENCOUNTER (EMERGENCY)
Facility: CLINIC | Age: 25
Discharge: HOME | End: 2025-06-20
Attending: EMERGENCY MEDICINE | Admitting: EMERGENCY MEDICINE
Payer: MEDICAID

## 2025-06-20 ENCOUNTER — APPOINTMENT (OUTPATIENT)
Dept: GENERAL RADIOLOGY | Facility: CLINIC | Age: 25
End: 2025-06-20
Attending: EMERGENCY MEDICINE
Payer: MEDICAID

## 2025-06-20 VITALS
SYSTOLIC BLOOD PRESSURE: 116 MMHG | DIASTOLIC BLOOD PRESSURE: 87 MMHG | HEART RATE: 92 BPM | RESPIRATION RATE: 16 BRPM | TEMPERATURE: 98.7 F | OXYGEN SATURATION: 98 %

## 2025-06-20 DIAGNOSIS — R74.01 ELEVATED AST (SGOT): ICD-10-CM

## 2025-06-20 DIAGNOSIS — D57.00 SICKLE CELL DISEASE WITH CRISIS (H): ICD-10-CM

## 2025-06-20 DIAGNOSIS — R10.84 ABDOMINAL PAIN, GENERALIZED: ICD-10-CM

## 2025-06-20 LAB
ALBUMIN SERPL BCG-MCNC: 4.5 G/DL (ref 3.5–5.2)
ALP SERPL-CCNC: 59 U/L (ref 40–150)
ALT SERPL W P-5'-P-CCNC: 32 U/L (ref 0–50)
ANION GAP SERPL CALCULATED.3IONS-SCNC: 14 MMOL/L (ref 7–15)
AST SERPL W P-5'-P-CCNC: 56 U/L (ref 0–45)
BASOPHILS # BLD AUTO: 0.2 10E3/UL (ref 0–0.2)
BASOPHILS NFR BLD AUTO: 1 %
BILIRUB SERPL-MCNC: 3.3 MG/DL
BUN SERPL-MCNC: 9.2 MG/DL (ref 6–20)
CALCIUM SERPL-MCNC: 8.6 MG/DL (ref 8.8–10.4)
CHLORIDE SERPL-SCNC: 104 MMOL/L (ref 98–107)
CREAT SERPL-MCNC: 0.51 MG/DL (ref 0.51–0.95)
EGFRCR SERPLBLD CKD-EPI 2021: >90 ML/MIN/1.73M2
EOSINOPHIL # BLD AUTO: 0.3 10E3/UL (ref 0–0.7)
EOSINOPHIL NFR BLD AUTO: 2 %
ERYTHROCYTE [DISTWIDTH] IN BLOOD BY AUTOMATED COUNT: 26.5 % (ref 10–15)
GLUCOSE SERPL-MCNC: 84 MG/DL (ref 70–99)
HCO3 SERPL-SCNC: 20 MMOL/L (ref 22–29)
HCT VFR BLD AUTO: 21.4 % (ref 35–47)
HGB BLD-MCNC: 7.7 G/DL (ref 11.7–15.7)
IMM GRANULOCYTES # BLD: 0.1 10E3/UL
IMM GRANULOCYTES NFR BLD: 0 %
LYMPHOCYTES # BLD AUTO: 1.9 10E3/UL (ref 0.8–5.3)
LYMPHOCYTES NFR BLD AUTO: 14 %
MCH RBC QN AUTO: 31.3 PG (ref 26.5–33)
MCHC RBC AUTO-ENTMCNC: 36 G/DL (ref 31.5–36.5)
MCV RBC AUTO: 87 FL (ref 78–100)
MONOCYTES # BLD AUTO: 1.1 10E3/UL (ref 0–1.3)
MONOCYTES NFR BLD AUTO: 8 %
NEUTROPHILS # BLD AUTO: 10.3 10E3/UL (ref 1.6–8.3)
NEUTROPHILS NFR BLD AUTO: 75 %
NRBC # BLD AUTO: 0.7 10E3/UL
NRBC BLD AUTO-RTO: 5 /100
PLATELET # BLD AUTO: 475 10E3/UL (ref 150–450)
POTASSIUM SERPL-SCNC: 4.1 MMOL/L (ref 3.4–5.3)
PROT SERPL-MCNC: 7.7 G/DL (ref 6.4–8.3)
RBC # BLD AUTO: 2.46 10E6/UL (ref 3.8–5.2)
RETICS # AUTO: 0.55 10E6/UL (ref 0.03–0.1)
RETICS/RBC NFR AUTO: 25.3 % (ref 0.5–2)
SODIUM SERPL-SCNC: 138 MMOL/L (ref 135–145)
WBC # BLD AUTO: 13.8 10E3/UL (ref 4–11)

## 2025-06-20 PROCEDURE — 36415 COLL VENOUS BLD VENIPUNCTURE: CPT | Performed by: EMERGENCY MEDICINE

## 2025-06-20 PROCEDURE — 96374 THER/PROPH/DIAG INJ IV PUSH: CPT | Mod: 59 | Performed by: EMERGENCY MEDICINE

## 2025-06-20 PROCEDURE — 73130 X-RAY EXAM OF HAND: CPT | Mod: LT

## 2025-06-20 PROCEDURE — 80053 COMPREHEN METABOLIC PANEL: CPT | Performed by: EMERGENCY MEDICINE

## 2025-06-20 PROCEDURE — 85004 AUTOMATED DIFF WBC COUNT: CPT | Performed by: EMERGENCY MEDICINE

## 2025-06-20 PROCEDURE — 99284 EMERGENCY DEPT VISIT MOD MDM: CPT | Mod: 25 | Performed by: EMERGENCY MEDICINE

## 2025-06-20 PROCEDURE — 250N000011 HC RX IP 250 OP 636: Performed by: EMERGENCY MEDICINE

## 2025-06-20 PROCEDURE — 82784 ASSAY IGA/IGD/IGG/IGM EACH: CPT

## 2025-06-20 PROCEDURE — 258N000003 HC RX IP 258 OP 636: Performed by: EMERGENCY MEDICINE

## 2025-06-20 PROCEDURE — 85045 AUTOMATED RETICULOCYTE COUNT: CPT | Performed by: EMERGENCY MEDICINE

## 2025-06-20 PROCEDURE — 71046 X-RAY EXAM CHEST 2 VIEWS: CPT

## 2025-06-20 PROCEDURE — 99285 EMERGENCY DEPT VISIT HI MDM: CPT | Performed by: EMERGENCY MEDICINE

## 2025-06-20 PROCEDURE — 96361 HYDRATE IV INFUSION ADD-ON: CPT | Performed by: EMERGENCY MEDICINE

## 2025-06-20 PROCEDURE — 96376 TX/PRO/DX INJ SAME DRUG ADON: CPT | Performed by: EMERGENCY MEDICINE

## 2025-06-20 RX ORDER — HEPARIN SODIUM (PORCINE) LOCK FLUSH IV SOLN 100 UNIT/ML 100 UNIT/ML
5-10 SOLUTION INTRAVENOUS
Status: DISCONTINUED | OUTPATIENT
Start: 2025-06-20 | End: 2025-06-20 | Stop reason: HOSPADM

## 2025-06-20 RX ORDER — HEPARIN SODIUM (PORCINE) LOCK FLUSH IV SOLN 100 UNIT/ML 100 UNIT/ML
100 SOLUTION INTRAVENOUS ONCE
Status: DISCONTINUED | OUTPATIENT
Start: 2025-06-20 | End: 2025-06-20

## 2025-06-20 RX ADMIN — HYDROMORPHONE HYDROCHLORIDE 1 MG: 1 INJECTION, SOLUTION INTRAMUSCULAR; INTRAVENOUS; SUBCUTANEOUS at 18:18

## 2025-06-20 RX ADMIN — HYDROMORPHONE HYDROCHLORIDE 1 MG: 1 INJECTION, SOLUTION INTRAMUSCULAR; INTRAVENOUS; SUBCUTANEOUS at 19:22

## 2025-06-20 RX ADMIN — HYDROMORPHONE HYDROCHLORIDE 1 MG: 1 INJECTION, SOLUTION INTRAMUSCULAR; INTRAVENOUS; SUBCUTANEOUS at 17:08

## 2025-06-20 RX ADMIN — SODIUM CHLORIDE 1000 ML: 0.9 INJECTION, SOLUTION INTRAVENOUS at 17:16

## 2025-06-20 RX ADMIN — SODIUM CHLORIDE, PRESERVATIVE FREE 5 ML: 5 INJECTION INTRAVENOUS at 21:05

## 2025-06-20 ASSESSMENT — ACTIVITIES OF DAILY LIVING (ADL)
ADLS_ACUITY_SCORE: 58

## 2025-06-20 NOTE — ED PROVIDER NOTES
[facility] EMERGENCY DEPARTMENT ([facility])    6/20/25      ED PROVIDER NOTE   History     Chief Complaint   Patient presents with    Sickle Cell Pain Crisis     Had transfusion yesterday which causes pain.       The history is provided by the patient and medical records.     Jennifer Cervantes is a 25 year old female with history of sickle cell disease who presents to the Emergency Department with increased pain. She was in infusion clinic earlier today for IV fluids and Crizanlizumab. This was infused through her implanted port.  She develops diffuse pain after these infusions and so presents to the Emergency Department for evaluation. Patient reports pain similar to her crisis pain starting today after her SERA appointment. Patient reports she had a blood transfusion yesterday at [facility]. Patient states she came here Monday night and got blood work showing her hemoglobin had dropped by 0.7. patient states she was dizzy and lethargic. Patient called the ER and was told to come in on Thursday for another blood transfusion. Patient states she started having some pain after her transfusion and was given pain medication. Patient states she was still in pain and tried to keep it going. Patient reports her SERA appointment was today and resulted in pain similar but more like shards of glass in her skin. Patient reports she asked for pain medication after her appointment and was trying to explain she wasn't a drug addict. Patient reports she was feeling pain similar to pain crisis but more dispersed compared to her regular pain. Patient reports she has a history of blood clots in her chest and would only get blood thinners back the. Patient notes moving her left hand a certain way creates sharp pain in her left hand up to her fingertips. Patient denies shortness of breath, difficulty breathing, chest pain, history of acute chest syndrome, symptoms in the chest, fever, chills, and coughs.     Acute Pain  Crisis Treatment: (limiting IV dosing)    ? ER   ? Dilaudid 1 mg IV q1h up to 3 doses  ? Toradol 30 mg IV x1   ? Maintenance IV fluids with LR  ? Other: Zofran 8 mg IV PRN nausea    ? Inpatient:  ? PCA Dilaudid 1 hr q30 minutes, no basal rate  ? Toradol 30 mg x6h x 4 hr  ? LR maintenance x 1-2 days  ? Other Medications: Zofran  ? ASA  ? Supportive Care: Docusate, Senna            Physical Exam   BP: 126/72  Pulse: 92  Temp: 98.7  F (37.1  C)  Resp: 16  SpO2: 92 %    Physical Exam  Vitals and nursing note reviewed.   Constitutional:       General: She is not in acute distress.     Appearance: Normal appearance. She is well-developed.   HENT:      Head: Normocephalic and atraumatic.   Eyes:      General: No scleral icterus.     Conjunctiva/sclera: Conjunctivae normal.   Cardiovascular:      Rate and Rhythm: Normal rate.   Pulmonary:      Effort: Pulmonary effort is normal. No respiratory distress.   Abdominal:      General: Abdomen is flat.   Musculoskeletal:      Cervical back: Normal range of motion and neck supple.   Skin:     General: Skin is warm and dry.      Findings: No rash.   Neurological:      Mental Status: She is alert and oriented to person, place, and time.             ED Course, Procedures, & Data      Procedures               Medications   sodium chloride 0.9% BOLUS 1,000 mL (has no administration in time range)   HYDROmorphone (DILAUDID) injection 1 mg (has no administration in time range)          Critical care was not performed.     Medical Decision Making  The patient's presentation was of high complexity (a chronic illness severe exacerbation, progression, or side effect of treatment).    The patient's evaluation involved:  ordering and/or review of 3+ test(s) in this encounter (see separate area of note for details)    The patient's management necessitated high risk (a parenteral controlled substance).    Assessment & Plan      25 year old female with history of sickle cell disease who presents  to the Emergency Department with increased pain.  Vital signs stable afebrile include normal pulse ox at 99% on room air.  IV established, labs sent which revealed elevated retake at 25.3, stable hemoglobin 7.7, remainder of CBC electrolytes remarkable.  Chest x-ray obtained and interpreted underbelly by me and revealed no acute process.  Patient was given a liter normal saline.  Pain management plan implemented with Dilaudid 1 mg IV x 3.  Upon repeat assessment patient achieved adequate control of her pain and subsequently discharged with plan to follow-up with hematology.    I have reviewed the nursing notes. I have reviewed the findings, diagnosis, plan and need for follow up with the patient.    New Prescriptions    No medications on file       Final diagnoses:   Sickle cell disease with crisis (H)     IBambi, am serving as a trained medical scribe to document services personally performed by Dmitri Thomas MD based on the provider's statements to me on June 20, 2025.  This document has been checked and approved by the attending provider.    I, Dmitri Thomas MD was physically present and have reviewed and verified the accuracy of this note documented by Bambi Tejeda, medical scribe.      Dmitri Thomas MD    [facility] EMERGENCY DEPARTMENT  6/20/2025     Dmitri Thomas MD  06/22/25 1942

## 2025-06-20 NOTE — ED TRIAGE NOTES
Coming straight from infusion clinic.  Didn't think home meds would be adequate post infusion.     Triage Assessment (Adult)       Row Name 06/20/25 4279          Triage Assessment    Airway WDL WDL        Respiratory WDL    Respiratory WDL WDL        Skin Circulation/Temperature WDL    Skin Circulation/Temperature WDL WDL        Cardiac WDL    Cardiac WDL WDL        Peripheral/Neurovascular WDL    Peripheral Neurovascular WDL WDL        Cognitive/Neuro/Behavioral WDL    Cognitive/Neuro/Behavioral WDL WDL

## 2025-06-21 ENCOUNTER — HEALTH MAINTENANCE LETTER (OUTPATIENT)
Age: 25
End: 2025-06-21

## 2025-06-23 ENCOUNTER — TELEPHONE (OUTPATIENT)
Dept: INTERNAL MEDICINE | Facility: CLINIC | Age: 25
End: 2025-06-23

## 2025-06-23 ENCOUNTER — PATIENT OUTREACH (OUTPATIENT)
Dept: CARE COORDINATION | Facility: CLINIC | Age: 25
End: 2025-06-23

## 2025-06-23 ENCOUNTER — VIRTUAL VISIT (OUTPATIENT)
Dept: GASTROENTEROLOGY | Facility: CLINIC | Age: 25
End: 2025-06-23
Payer: MEDICAID

## 2025-06-23 ENCOUNTER — INFUSION THERAPY VISIT (OUTPATIENT)
Dept: ONCOLOGY | Facility: CLINIC | Age: 25
End: 2025-06-23
Attending: PEDIATRICS
Payer: MEDICAID

## 2025-06-23 ENCOUNTER — NURSE TRIAGE (OUTPATIENT)
Dept: ONCOLOGY | Facility: CLINIC | Age: 25
End: 2025-06-23

## 2025-06-23 VITALS
DIASTOLIC BLOOD PRESSURE: 63 MMHG | RESPIRATION RATE: 16 BRPM | TEMPERATURE: 97.6 F | HEART RATE: 87 BPM | OXYGEN SATURATION: 94 % | SYSTOLIC BLOOD PRESSURE: 120 MMHG

## 2025-06-23 DIAGNOSIS — R74.01 ELEVATED AST (SGOT): ICD-10-CM

## 2025-06-23 DIAGNOSIS — D57.00 SICKLE CELL DISEASE WITH CRISIS (H): ICD-10-CM

## 2025-06-23 DIAGNOSIS — D57.00 SICKLE CELL PAIN CRISIS (H): Primary | ICD-10-CM

## 2025-06-23 DIAGNOSIS — R10.84 ABDOMINAL PAIN, GENERALIZED: Primary | ICD-10-CM

## 2025-06-23 DIAGNOSIS — G81.10 SPASTIC HEMIPLEGIA, UNSPECIFIED ETIOLOGY, UNSPECIFIED LATERALITY (H): ICD-10-CM

## 2025-06-23 LAB
HBV SURFACE AG SERPL QL IA: NONREACTIVE
HCV AB SERPL QL IA: NONREACTIVE

## 2025-06-23 PROCEDURE — 96374 THER/PROPH/DIAG INJ IV PUSH: CPT

## 2025-06-23 PROCEDURE — 1126F AMNT PAIN NOTED NONE PRSNT: CPT | Mod: 95 | Performed by: PHYSICIAN ASSISTANT

## 2025-06-23 PROCEDURE — 258N000003 HC RX IP 258 OP 636: Performed by: PEDIATRICS

## 2025-06-23 PROCEDURE — 96361 HYDRATE IV INFUSION ADD-ON: CPT

## 2025-06-23 PROCEDURE — 250N000011 HC RX IP 250 OP 636: Mod: JZ | Performed by: PEDIATRICS

## 2025-06-23 PROCEDURE — 96375 TX/PRO/DX INJ NEW DRUG ADDON: CPT

## 2025-06-23 PROCEDURE — 98002 SYNCH AUDIO-VIDEO NEW MOD 45: CPT | Performed by: PHYSICIAN ASSISTANT

## 2025-06-23 PROCEDURE — 250N000013 HC RX MED GY IP 250 OP 250 PS 637: Performed by: PEDIATRICS

## 2025-06-23 PROCEDURE — 96376 TX/PRO/DX INJ SAME DRUG ADON: CPT

## 2025-06-23 RX ORDER — DIPHENHYDRAMINE HCL 25 MG
50 CAPSULE ORAL
Status: COMPLETED | OUTPATIENT
Start: 2025-06-23 | End: 2025-06-23

## 2025-06-23 RX ORDER — HEPARIN SODIUM (PORCINE) LOCK FLUSH IV SOLN 100 UNIT/ML 100 UNIT/ML
5 SOLUTION INTRAVENOUS
Status: DISCONTINUED | OUTPATIENT
Start: 2025-06-23 | End: 2025-06-23 | Stop reason: HOSPADM

## 2025-06-23 RX ORDER — KETOROLAC TROMETHAMINE 30 MG/ML
30 INJECTION, SOLUTION INTRAMUSCULAR; INTRAVENOUS ONCE
Status: COMPLETED | OUTPATIENT
Start: 2025-06-23 | End: 2025-06-23

## 2025-06-23 RX ORDER — ONDANSETRON 2 MG/ML
8 INJECTION INTRAMUSCULAR; INTRAVENOUS EVERY 6 HOURS PRN
Status: CANCELLED
Start: 2025-08-01

## 2025-06-23 RX ORDER — HEPARIN SODIUM (PORCINE) LOCK FLUSH IV SOLN 100 UNIT/ML 100 UNIT/ML
5 SOLUTION INTRAVENOUS
Status: CANCELLED | OUTPATIENT
Start: 2025-08-01

## 2025-06-23 RX ORDER — ONDANSETRON 2 MG/ML
8 INJECTION INTRAMUSCULAR; INTRAVENOUS EVERY 6 HOURS PRN
Status: DISCONTINUED | OUTPATIENT
Start: 2025-06-23 | End: 2025-06-23 | Stop reason: HOSPADM

## 2025-06-23 RX ORDER — ONDANSETRON 4 MG/1
8 TABLET, FILM COATED ORAL
Status: CANCELLED
Start: 2025-08-01

## 2025-06-23 RX ORDER — KETOROLAC TROMETHAMINE 30 MG/ML
30 INJECTION, SOLUTION INTRAMUSCULAR; INTRAVENOUS ONCE
Status: CANCELLED
Start: 2025-08-01 | End: 2025-08-01

## 2025-06-23 RX ORDER — DIPHENHYDRAMINE HCL 25 MG
50 CAPSULE ORAL
Status: CANCELLED
Start: 2025-08-01

## 2025-06-23 RX ORDER — HEPARIN SODIUM,PORCINE 10 UNIT/ML
5 VIAL (ML) INTRAVENOUS
Status: CANCELLED | OUTPATIENT
Start: 2025-08-01

## 2025-06-23 RX ORDER — OXYCODONE HYDROCHLORIDE 10 MG/1
10 TABLET ORAL EVERY 6 HOURS PRN
Qty: 12 TABLET | Refills: 0 | Status: SHIPPED | OUTPATIENT
Start: 2025-06-23

## 2025-06-23 RX ADMIN — KETOROLAC TROMETHAMINE 30 MG: 30 INJECTION, SOLUTION INTRAMUSCULAR; INTRAVENOUS at 08:55

## 2025-06-23 RX ADMIN — DIPHENHYDRAMINE HYDROCHLORIDE 50 MG: 25 CAPSULE ORAL at 08:51

## 2025-06-23 RX ADMIN — HYDROMORPHONE HYDROCHLORIDE 1 MG: 1 INJECTION, SOLUTION INTRAMUSCULAR; INTRAVENOUS; SUBCUTANEOUS at 10:58

## 2025-06-23 RX ADMIN — SODIUM CHLORIDE, SODIUM LACTATE, POTASSIUM CHLORIDE, AND CALCIUM CHLORIDE 1000 ML: .6; .31; .03; .02 INJECTION, SOLUTION INTRAVENOUS at 08:42

## 2025-06-23 RX ADMIN — HYDROMORPHONE HYDROCHLORIDE 1 MG: 1 INJECTION, SOLUTION INTRAMUSCULAR; INTRAVENOUS; SUBCUTANEOUS at 09:42

## 2025-06-23 RX ADMIN — Medication 5 ML: at 12:12

## 2025-06-23 RX ADMIN — ONDANSETRON 8 MG: 2 INJECTION INTRAMUSCULAR; INTRAVENOUS at 08:51

## 2025-06-23 RX ADMIN — HYDROMORPHONE HYDROCHLORIDE 1 MG: 1 INJECTION, SOLUTION INTRAMUSCULAR; INTRAVENOUS; SUBCUTANEOUS at 08:42

## 2025-06-23 NOTE — TELEPHONE ENCOUNTER
PRIOR AUTHORIZATION DENIED    Medication: Trelegy Ellipta 100-62.5-25 AEPB-PA  DENIED     Denial Date: 6/20/2025    Denial Rational:                   Appeal Information:

## 2025-06-23 NOTE — TELEPHONE ENCOUNTER
Central Prior Authorization Team  Phone: [phone]    PA Initiation    Medication: TRELEGY ELLIPTA 100-62.5-25 MCG/ACT IN AEPB  Insurance Company: [employer] for [place] Medicaid Phone [phone] Fax [phone]  Pharmacy Filling the Rx: [facility] [place], [place] - [address] [place] [address] [id number]  Filling Pharmacy Phone: [phone]  Filling Pharmacy Fax:    Start Date: 6/23/2025

## 2025-06-23 NOTE — PROGRESS NOTES
"GI CLINIC VISIT    CC/REFERRING MD:  Lisa Coombs      ASSESSMENT/PLAN:    #generalized abdominal pain  Patient reports generalized abdominal pain ongoing since last year -- unclear exacerbating or relieving factors. CT abd/pelvis done in Oct 2024, showed chronic small size of spleen, and a small umbilical hernia. Pain will occur 2-3x/week and is described as \"sharp and stabbing\". This is accompanied by nausea. She notes similar symptoms which prompted her cholecystectomy. Will plan to obtain celiac labs, AXR, H pylori testing and fecal calprotectin. If fecal calprotectin elevated, will plan to obtain colonoscopy. She has a previous EGD ordered, but has had difficulty scheduling this. Will await results of fecal calprotectin to order scopes. MRI of abdomen ordered by heme/onc provider. If above work up is unrevealing, symptoms are likely functional in nature. Would consider nutrition and GI health psych referral, consider use of bentyl PRN.       #elevated AST  AST persistently elevated since Nov 2024. Will obtain additional labs as well as abdominal US.         RTC 3 months, after testing.    Thank you for this consultation.  It was a pleasure to participate in the care of this patient; please contact us with any further questions.       45 minutes spent on the date of the encounter doing chart review, review of outside records, review of test results, patient visit, and documentation    This note was created with voice recognition software, and while reviewed for accuracy, typos may remain.    Venkatesh Hernandez PA-C  Division of Gastroenterology, Hepatology and Nutrition  [facility]  24 y/o F with pmhx of sickle cell disease presents for evaluation of abdominal pain.     Of note, connection was poor so history was a bit difficult to obtain.     Patient reports generalized abdominal pain which began sometime last year. She describes pain as \"sharp and shooting in " "nature\". She will generally have pain 2-3x/week. She is unable to identify any exacerbating or relieving factors. She notes that at times pain can start after eating, other times can start when she hasn't eaten anything. She also reports associated nausea, that can occur with the abdominal pain. At times she will vomit, as this may make her feel better. Pain can last days, off and on. She has had previous visits to the ED for her abdominal pain, notes she will get pain medications through an IV, and this does help with her pain. Denies heartburn, odynophagia, or dysphagia. She does endorse abdominal bloating and early satiety.     She has a previous history of cholecystectomy when she was younger due to similar symptoms. She had a CT abd/pelvis in October 2024 which was otherwise unrevealing.     In regards to her bowel movements, she will generally have 1-2 BM daily, described as a \"soft and formed\". Denies pushing/straining to have a BM. She does endorse bright red blood in her stool -- she cannot tell me how frequently she has seen this, will see this on the toilet paper or in the toilet bowl.       Denies daily NSAIDs (she used to take this more frequently) or Tylenol. denies use of OTC herbal supplements/weight loss products.      Denies use of ETOH and tobacco products. No recreational drug use.     No family history of GI related malignancy (esophageal, gastric, pancreatic, liver or colon) or family history of IBD/celiac disease.       ROS:    No fevers or chills  No weight loss  No odynophagia or dysphagia  +BRBPR  +anxiety and depression      PROBLEM LIST  Patient Active Problem List    Diagnosis Date Noted    Meniscal injury, left, initial encounter 02/12/2025     Priority: Medium    Acute pain of left knee 02/06/2025     Priority: Medium     Chondromalacia and possible anserine bursitis per MRI  2/4/2025 DDx bone problem related to sickle cell, will need MRI.      Exposure to blood or body fluid " 01/31/2025     Priority: Medium    Vaginal bleeding 12/07/2024     Priority: Medium    Anemia, unspecified type 12/07/2024     Priority: Medium    Hypoxia 08/16/2024     Priority: Medium    Abdominal discomfort 05/16/2024     Priority: Medium    Cerebral infarction, unspecified mechanism (H) 04/23/2024     Priority: Medium    Chest pain, unspecified type 05/10/2023     Priority: Medium    Dyspnea on exertion 08/20/2022     Priority: Medium    Pleuritic chest pain 01/29/2022     Priority: Medium    Subtherapeutic anticoagulation 01/29/2022     Priority: Medium    Chronic pulmonary embolism without acute cor pulmonale, unspecified pulmonary embolism type (H) 11/21/2021     Priority: Medium    Pulmonary embolism, other, unspecified chronicity, unspecified whether acute cor pulmonale present (H) 11/10/2021     Priority: Medium    Feeling angry 10/19/2021     Priority: Medium    Uses contraception 10/19/2021     Priority: Medium    Spastic hemiplegia (H) 09/22/2021     Priority: Medium    Spasticity 09/07/2021     Priority: Medium    Superficial venous thrombosis of arm, right 03/25/2021     Priority: Medium    Nexplanon in place 02/11/2021     Priority: Medium    Multiple subsegmental pulmonary emboli without acute cor pulmonale (H) 02/01/2021     Priority: Medium    Atypical squamous cells of undetermined significance on cytologic smear of cervix (ASC-US) 08/17/2020     Priority: Medium     From visit on 8/12/20:  History of abnormal pap tests?  No  2020 ASCUS  20 y.o.  Plan: pap 8/2021  Pt is on Hydrea      Sickle cell crisis (H) 03/28/2020     Priority: Medium    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) 03/19/2020     Priority: Medium    History of stroke 03/19/2020     Priority: Medium    Overweight 03/13/2020     Priority: Medium    Acute pain 03/13/2020     Priority: Medium    Sickle cell pain crisis (H) 03/03/2020     Priority: Medium    Hemiplegia and hemiparesis following cerebral infarction affecting right dominant  side (H)      Priority: Medium     right hand contractures      Iron overload due to repeated red blood cell transfusions      Priority: Medium    Hemochromatosis due to repeated red blood cell transfusions      Priority: Medium    Cognitive developmental delay      Priority: Medium     low IQ. Please recognize when managing pain and planning with her      Oppositional defiant behavior      Priority: Medium    Migraine headache 02/18/2020     Priority: Medium    Hypoxemia 02/18/2020     Priority: Medium    Hydrosalpinx 02/18/2020     Priority: Medium    H/O: stroke 02/18/2020     Priority: Medium    Gastritis 02/18/2020     Priority: Medium    Depressive disorder 02/18/2020     Priority: Medium    Other chronic pain 02/18/2020     Priority: Medium     back and limb pain that 'sneaks up' and may transition into sickle crisis pain.      Allergic reaction 02/18/2020     Priority: Medium    Moderate persistent asthma without complication 05/03/2019     Priority: Medium    Constipation 05/03/2019     Priority: Medium    Generalized anxiety disorder with panic attacks 05/03/2019     Priority: Medium    Cerebral infarction (H) 01/01/2015     Priority: Medium    Transient ischemic attack 10/18/2014     Priority: Medium       PERTINENT PAST MEDICAL HISTORY:    Past Medical History:   Diagnosis Date    Anxiety     Bleeding disorder     Blood clotting disorder     Cerebral infarction (H) 2015    Cognitive developmental delay     low IQ. 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 side (H)     right hand contractures    Iron overload due to repeated red blood cell transfusions     Migraines     Multiple subsegmental pulmonary emboli without acute cor pulmonale (H) 02/01/2021    Oppositional defiant behavior     Presence of intrauterine contraceptive device 2/18/2020    Superficial venous thrombosis of arm,  right 03/25/2021    Uncomplicated asthma        PREVIOUS SURGERIES:    Past Surgical History:   Procedure Laterality Date    AS INSERT TUNNELED CV 2 CATH W/O PORT/PUMP      CHOLECYSTECTOMY      CV RIGHT HEART CATH MEASUREMENTS RECORDED N/A 11/18/2021    Procedure: Right Heart Cath;  Surgeon: Jackson Stauffer MD;  Location:  HEART CARDIAC CATH LAB    INSERT PORT VASCULAR ACCESS Left 4/21/2021    Procedure: INSERTION, VASCULAR ACCESS PORT (NOT SURE ON SIDE UNTIL REMOVAL);  Surgeon: Rajan More MD;  Location: [facility] OR    IR CHEST PORT PLACEMENT > 5 YRS OF AGE  4/21/2021    IR CVC NON TUNNEL LINE REMOVAL  5/6/2021    IR CVC NON TUNNEL PLACEMENT > 5 YRS  04/07/2020    IR CVC NON TUNNEL PLACEMENT > 5 YRS  4/30/2021    IR CVC NON TUNNEL PLACEMENT > 5 YRS  9/7/2022    IR PORT REMOVAL LEFT  4/21/2021    REMOVE PORT VASCULAR ACCESS Left 4/21/2021    Procedure: REMOVAL, VASCULAR ACCESS PORT LEFT;  Surgeon: Rajan More MD;  Location: [facility] OR    REPAIR TENDON ELBOW Right 10/02/2019    Procedure: Right Elbow Flexor Lengthening, Flexor Pronator Slide Of Wrist and Finger, Thumb Adductor Lengthening;  Surgeon: Anai Franco MD;  Location: [facility] OR    TONSILLECTOMY Bilateral 10/02/2019    Procedure: Bilateral Tonsillectomy;  Surgeon: Farhana Guy MD;  Location: [facility] OR    C BREAST REDUCTION (INCLUDES LIPO) TIER 3 Bilateral 04/23/2019       PREVIOUS ENDOSCOPY:  None.    ALLERGIES:     Allergies   Allergen Reactions    Contrast Dye Angioedema     Hives and breathing issues    Fish-Derived Products Hives    Seafood Hives    Adhesive Tape Hives     Primipore dressing causes hives    Gadolinium     Iodinated Contrast Media        PERTINENT MEDICATIONS:    Current Outpatient Medications:     albuterol (PROVENTIL) (2.5 MG/3ML) 0.083% neb solution, Take 1 vial (2.5 mg) by nebulization every 6 hours as needed for shortness of breath, wheezing or cough., Disp: 90 mL, Rfl: 0    aspirin (ASA) 81 MG chewable tablet,  Take 1 tablet (81 mg) by mouth 2 times daily, Disp: 60 tablet, Rfl: 11    azelastine (ASTELIN) 0.1 % nasal spray, Twice daily, spray once into each nostril after blowing your nose. Stand still for 1-3 minutes after spraying into nostril, to avoid dripping. After that, your may blow your nose again, to clear the remaining medication., Disp: 30 mL, Rfl: 4    budesonide-formoterol (SYMBICORT/BREYNA) 160-4.5 MCG/ACT Inhaler, Inhale 2 puffs twice daily plus 1-2 puffs as needed. May use up to 12 puffs per day., Disp: 20.4 g, Rfl: 11    deferasirox (JADENU) 360 MG tablet, Take 4 tablets (1,440 mg) by mouth daily., Disp: 120 tablet, Rfl: 11    Deferiprone, Twice Daily, 1000 MG TABS, Take 2,000 mg by mouth 2 times daily., Disp: 150 tablet, Rfl: 3    diclofenac (VOLTAREN) 1 % topical gel, Apply 4 g topically 4 times daily as needed (moderate knee pain)., Disp: 350 g, Rfl: 1    diphenhydrAMINE (BENADRYL) 50 MG capsule, Administer 12  hours pre - IV contrast injection and 2 hours prior to contrast. Patient must have a ., Disp: 2 capsule, Rfl: 0    EPINEPHrine (ANY BX GENERIC EQUIV) 0.3 MG/0.3ML injection 2-pack, Inject 0.3 mLs (0.3 mg) into the muscle as needed for anaphylaxis., Disp: 1 each, Rfl: 1    Fluticasone-Umeclidin-Vilant (TRELEGY ELLIPTA) 100-62.5-25 MCG/ACT oral inhaler, Inhale 1 puff into the lungs daily., Disp: 60 each, Rfl: 2    hydroxyurea (HYDREA) 500 MG capsule, Take 6 capsules (3,000 mg) by mouth daily, Disp: 180 capsule, Rfl: 11    ibuprofen (ADVIL/MOTRIN) 800 MG tablet, Take 800 mg by mouth every 8 hours as needed for moderate pain (Patient not taking: Reported on 6/23/2025), Disp: , Rfl:     ketotifen fumarate 0.035% 0.035 % SOLN ophthalmic solution, Place 1 drop into both eyes 2 times daily as needed for itching., Disp: 10 mL, Rfl: 11    methocarbamol (ROBAXIN) 750 MG tablet, Take 1 tablet (750 mg) by mouth 4 times daily as needed for muscle spasms (during sickle pain crises. Okay to take scheduled  while in pain)., Disp: 60 tablet, Rfl: 1    methylPREDNISolone (MEDROL) 32 MG tablet, Take 1 tab 12 hours before appointment and 1 tab 2 hours prior to the procedure with IV contrast., Disp: 2 tablet, Rfl: 0    naloxone (NARCAN) 4 MG/0.1ML nasal spray, Spray 4 mg into one nostril alternating nostrils as needed for opioid reversal. every 2-3 minutes until assistance arrives, Disp: 0.2 mL, Rfl: 0    naproxen (NAPROSYN) 500 MG tablet, Take 1 tablet (500 mg) by mouth daily as needed (pain). Take with food. Use sparingly and avoid taking on the same days you take ibuprofen., Disp: 30 tablet, Rfl: 1    oxyCODONE IR (ROXICODONE) 10 MG tablet, Take 1 tablet (10 mg) by mouth every 6 hours as needed for moderate to severe pain. (Goal of less than 4 per day), Disp: 12 tablet, Rfl: 0    pantoprazole (PROTONIX) 40 MG EC tablet, Take 1 tablet (40 mg) by mouth daily., Disp: 30 tablet, Rfl: 0    sertraline (ZOLOFT) 50 MG tablet, Take 1 tablet (50 mg) by mouth daily., Disp: 90 tablet, Rfl: 3  No current facility-administered medications for this visit.    Facility-Administered Medications Ordered in Other Visits:     heparin lock flush 100 unit/mL injection 5 mL, 5 mL, Intracatheter, Once PRN, Eric Duncan MD, 5 mL at 06/23/25 1212    ondansetron (ZOFRAN) injection 8 mg, 8 mg, Intravenous, Q6H PRN, Eric Duncan MD, 8 mg at 06/23/25 0851    sodium chloride (PF) 0.9% PF flush 3-20 mL, 3-20 mL, Intracatheter, Q1H PRN, Eric Duncan MD, 10 mL at 06/23/25 1211    SOCIAL HISTORY:    Social History     Socioeconomic History    Marital status: Single     Spouse name: Not on file    Number of children: Not on file    Years of education: Not on file    Highest education level: Not on file   Occupational History    Not on file   Tobacco Use    Smoking status: Never     Passive exposure: Never    Smokeless tobacco: Never   Substance and Sexual Activity    Alcohol use: Not Currently     Alcohol/week: 0.0 standard drinks  of alcohol    Drug use: Never    Sexual activity: Not Currently     Partners: Male     Birth control/protection: Implant   Other Topics Concern    Parent/sibling w/ CABG, MI or angioplasty before 65F 55M? Not Asked   Social History Narrative    Lives with mom and extended family (mom is her PCA and ILS worker). She does still need a lot of help and does not live independently due to her significant SCD comorbidities and cognitive delay from stroke.     Social Drivers of Health     Financial Resource Strain: Low Risk  (12/7/2024)    Financial Resource Strain     Within the past 12 months, have you or your family members you live with been unable to get utilities (heat, electricity) when it was really needed?: No   Food Insecurity: Low Risk  (12/7/2024)    Food Insecurity     Within the past 12 months, did you worry that your food would run out before you got money to buy more?: No     Within the past 12 months, did the food you bought just not last and you didn t have money to get more?: No   Transportation Needs: Low Risk  (12/7/2024)    Transportation Needs     Within the past 12 months, has lack of transportation kept you from medical appointments, getting your medicines, non-medical meetings or appointments, work, or from getting things that you need?: No   Physical Activity: Unknown (7/3/2024)    Exercise Vital Sign     Days of Exercise per Week: 1 day     Minutes of Exercise per Session: Not on file   Stress: No Stress Concern Present (7/3/2024)    [employer] - Occupational Stress Questionnaire     Feeling of Stress : Only a little   Social Connections: Unknown (7/3/2024)    Social Connection and Isolation Panel [NHANES]     Frequency of Communication with Friends and Family: Not on file     Frequency of Social Gatherings with Friends and Family: Never     Attends Mandaen Services: Not on file     Active Member of Clubs or Organizations: Not on file     Attends Club or  Organization Meetings: Not on file     Marital Status: Not on file   Interpersonal Safety: Low Risk  (2/12/2025)    Interpersonal Safety     Do you feel physically and emotionally safe where you currently live?: Yes     Within the past 12 months, have you been hit, slapped, kicked or otherwise physically hurt by someone?: No     Within the past 12 months, have you been humiliated or emotionally abused in other ways by your partner or ex-partner?: No   Housing Stability: Low Risk  (12/7/2024)    Housing Stability     Do you have housing? : Yes     Are you worried about losing your housing?: No   Recent Concern: Housing Stability - High Risk (11/28/2024)    Housing Stability     Do you have housing? : No     Are you worried about losing your housing?: No       FAMILY HISTORY:  FH of CRC: none  FH of IBD: none  Family History   Problem Relation Age of Onset    Sickle Cell Trait Mother     Hypertension Mother     Asthma Mother     Sickle Cell Trait Father     Glaucoma No family hx of     Macular Degeneration No family hx of     Diabetes No family hx of     Gout No family hx of        Past/family/social history reviewed and no changes    PHYSICAL EXAMINATION:  General: Patient appears well in no acute distress.   Skin: No visualized rash or lesions on visualized skin  Eyes: EOMI, no erythema, sclera icterus or discharge noted  Resp: Appears to be breathing comfortably without accessory muscle usage, speaking in full sentences, no cough  MSK: Appears to have normal range of motion based on visualized movements  Neurologic: No apparent tremors, facial movements symmetric  Psych: normal affect , alert and oriented

## 2025-06-23 NOTE — PROGRESS NOTES
Ambulatory Care Management- Transportation Support  Specialty SW - [facility]     Data/Intervention:  Patient Name:    Jennifer Cervantes DOB/Age: 2000 (25 year old)     CCRC team member assisting Specialty SW with transportation request.      Assessment:  CHW/CTA scheduled online with [employer] for a  medical appointment transportation in conjunction with patient's insurance.     Taxi company: [employer]  reference number is [id number]    Patient will need to call above taxi company at phone number: [phone] when ready for return ride home.      Plan:  Patient is aware of the transportation plan.  available to assist with any other needs.      Lisandra Kilgore MA

## 2025-06-23 NOTE — PATIENT INSTRUCTIONS
Contact Numbers    [facility] [place], [place] Main Line/TRIAGE: [phone]    Call with chills and/or temperature greater than or equal to 100.5, uncontrolled nausea/vomiting, diarrhea, constipation, dizziness, shortness of breath, chest pain, bleeding, unexplained bruising, or any new/concerning symptoms, questions/concerns.     If you are having any concerning symptoms or wish to speak to a provider before your next infusion visit, please call your care coordinator or triage to notify them so we can adequately serve you.       After Hours: [phone]    If after hours, weekends, or holidays, call main hospital  and ask for Oncology doctor on call.      June 2025 Sunday Monday Tuesday Wednesday Thursday Friday Saturday   1     2    Infusion 180  12:30 PM   (180 min.)    BMT INFUSION NURSE   [facility] [place] 3     4    Infusion 180  10:30 AM   (180 min.)    BMT INFUSION NURSE   [facility] [place] 5    Adult Psychotherapy   9:45 AM   (60 min.)   Shandra Caruso, PhD LP   [facility] [place] 6    Admission   2:41 AM   Court Ring MD   [facility] Emergency Department   (Discharge: 6/6/2025)    Xray General   4:35 AM   (20 min.)   URXR3   [facility] Imaging 7       8     9    Infusion 180   9:00 AM   (180 min.)    BMT INFUSION NURSE   [facility] [place] 10     11    Infusion 180  10:00 AM   (180 min.)    BMT INFUSION NURSE   [facility] [place] 12    Admission   6:06 PM   Jose Eduardo Rush MD   [facility] Emergency Department   (Discharge: 6/12/2025) 13     14       15     16    Infusion Visit   1:00 PM   (180 min.)    CANCER INFUSION NURSE   [facility] [place] 17    Admission   6:41 PM   Judson Tse MD   [facility]  [facility] Emergency Department   (Discharge: 6/18/2025) 18     19    Infusion 180   8:00 AM   (180 min.)   UC BMT INFUSION NURSE   [facility] [place]    Adult Psychotherapy   9:45 AM   (60 min.)   Shandra Caruso, PhD [facility]    Extremity Treatment   2:50 PM   (40 min.)   Emma Garcia, PT   [facility] 20    Infusion 240  12:00 PM   (240 min.)   UC ONC INFUSION NURSE   [facility]    Admission   4:31 PM   [facility] Emergency Department   (Discharge: 6/20/2025)    Xray General   7:05 PM   (20 min.)   URXR3   [facility] Imaging    Xray General   7:10 PM   (20 min.)   URXR3   [facility] 21       22     23    Infusion 180   8:00 AM   (180 min.)   UC ONC INFUSION NURSE   [facility]    New Patient  12:15 PM   (45 min.)   Venkatesh Hernandez PA-C   [facility] 24     25    Extremity Treatment   9:40 AM   (40 min.)   Emma Garcia, PT   [facility] 26    Adult Psychotherapy   9:45 AM   (60 min.)   Shandra Caruso, PhD [facility] 27     28       29 30 July 2025 Sunday Monday Tuesday Wednesday Thursday Friday Saturday             1     2     3     4     5       6     7     8     9     10     11     12       13     14    MR ABDOMEN W/O CONTRAST   7:30 AM   (40 min.)   UUMR1   [facility] 15     16     17     18    [facility]  10:00 AM   (15 min.)   [facility] LAB DRAW   [facility]    Return Active Treatment  10:15 AM   (45 min.)   Patricia Mantilla CNP   [facility]    Infusion 240  11:30 AM   (240 min.)   UC ONC INFUSION NURSE   [facility]  [facility] 19       20     21    New Patient Visit   8:15 AM   (30 min.)   Chen Preciado MD   [facility] Internal Medicine [place] 22     23     24    Neurotoxin   7:45 AM   (60 min.)   Katherine Pierce MD   [facility] 25     26       27     28     29     30     31                                 Lab Results:  No results found for this or any previous visit (from the past 12 hours).

## 2025-06-23 NOTE — TELEPHONE ENCOUNTER
Narcotic Refill Request    Medication(s) requested:  oxycodone  Person Requesting Refill:adelaide  What pain is the medication treating: sickle cell pain  How is the medication being taken?:goal of 4 per day  Does pt have enough for today? no  Is pain being adequately controlled on the current regimen?: okay, requires IVF/Pain at times  Experiencing any side effects from medication?: denies    Date of most recent appointment:  5/23/2025 Patricia Mantilla CNP  Any No Show Visits:none  Next appointment:   7/18/2025 Patricia Mantilla   Last fill date and by whom:  dr. Duncan on 6/16/2025   Reviewed: YES    Send to provider: patricia mantilla and RNCC Arely

## 2025-06-23 NOTE — NURSING NOTE
Current patient location: [address] [facility] [address]    Is the patient currently in the state of [place]? YES    Visit mode: VIDEO    If the visit is dropped, the patient can be reconnected by:VIDEO VISIT: Text to cell phone:   Telephone Information:   Mobile [phone]       Will anyone else be joining the visit? NO  (If patient encounters technical issues they should call [phone])    Are changes needed to the allergy or medication list? No    Are refills needed on medications prescribed by this physician? NO    Rooming Documentation:  Questionnaire(s) completed    Reason for visit: Consult    Raul DIAZ

## 2025-06-23 NOTE — PROGRESS NOTES
Infusion Nursing Note:  Jennifer Cervantes presents today for IVF and pain medications.    Patient seen by provider today: No    Note: Pt presented to clinic with c/o generalized aching pain, 9/10.  Her pain goal today is 5/10 which was met by time of discharge.  Pt endorses a dry cough starting this morning, not observed in clinic; denies s/s infection or associated CP/SOB/ORTEGA.  Pt will continue to monitor at home and notify triage of any changes or concerns.    Intravenous Access:  Implanted Port.    Treatment Conditions:  Not Applicable.    Post Infusion Assessment:  Patient tolerated infusion without incident.  Blood return noted pre and post infusion.  Site patent and intact, free from redness, edema or discomfort.  No evidence of extravasations.  Access discontinued per protocol.    Discharge Plan:   Prescription refills given for Oxycodone, naproxen and fluticasone inhaler.  Discharge instructions reviewed with: Patient.  Patient and/or family verbalized understanding of discharge instructions and all questions answered.  AVS to patient via [employer].  Patient will return 7/18/2025 for next appointment.   Patient discharged in stable condition accompanied by: self.  Departure Mode: Ambulatory.    Lupe Bolton RN

## 2025-06-23 NOTE — PROGRESS NOTES
Virtual Visit Details    Type of service:  Video Visit       Originating Location (pt. Location): Home    Distant Location (provider location):  Off-site  Platform used for Video Visit: Clarisa

## 2025-06-23 NOTE — TELEPHONE ENCOUNTER
[facility] Prior Authorization Team Request  Medication: Trelegy Ellipa 100-62.5  Dosing: one puff daily  Qty: 60   Day Supply: 30  NDC (required for Medicaid members): [address]-26    Insurance   BIN: [id number]  PCN: [id number]  [id number]  [place] [id number]    CoverMyMeds Key (if applicable):     Additional documentation:       Filling Pharmacy: [facility]  Phone Number: [phone]  Contact:    Pharmacy NPI (required for Medicaid members): [id number]

## 2025-06-23 NOTE — TELEPHONE ENCOUNTER
Oncology Nurse Triage - Sickle Cell Pain Crisis:    Situation: Jennifer  calling about Sickle Cell Pain Crisis, requesting to be added on for IV fluids and pain medicine    Background:     Patient's last infusion was 6/20/2025 n ED  Last clinic visit date:5/23/2025 Patricia Mantilla  Does patient have active treatment plan? Yes    Assessment of Symptoms:  Onset/Duration of symptoms: 1 day    Is it typical sickle cell pain? Yes  Location: generalized  Character: Dull ache  Intensity: 9/10    Any radiation of pain, numbness, tingling, weakness, warmth, swelling, discoloration of arms or legs?No    Fever? No  Chest Pain Present: No  Shortness of breath: No    Other home therapies tried: HEAT/HEATING PAD and WARM BATH     Last home medication taken and when: oxycodone last taken yesterday     Any Refills Needed?: Yes, oxycodone 10mg every 6 hours,  [place] pharmacy    Does patient have transportation & length of time to get to clinic: No  Has own ride if something opens up early, but if later in day needs transportation assistance.     Recommendations:   0723 offer for [facility] infusion at 8:00am, pt in agreement and can get own ride to clinic but needs assist with ride home.     0734  request sent to assist with transportation home from infusion appt today.     Please note, if you are late for your appt, you risk losing your infusion appt as it may delay another patient's infusion who arrived on time.

## 2025-06-23 NOTE — PATIENT INSTRUCTIONS
It was a pleasure meeting with you today and discussing your healthcare plan. Below is a summary of what we covered:    --please obtain labs stool studies and abdominal x-ray.   --schedule abdominal ultrasound.   --as soon as you submit stool studies, we can determine if you would need a colonoscopy in addition to an upper endoscopy.     Follow up in clinic after testing.      Please see below for any additional questions and scheduling guidelines.    Sign up for [employer]: [employer] patient portal serves as a secure platform for accessing your medical records from the [facility]. Additionally, [employer] facilitates easy, timely, and secure messaging with your care team. If you have not signed up, you may do so by using the provided code or calling [phone].    Coordinating your care after your visit:  There are multiple options for scheduling your follow-up care based on your provider's recommendation.    How do I schedule a follow-up clinic appointment:   After your appointment, you may receive scheduling assistance with the Clinic Coordinators by having a seat in the waiting room and a Clinic Coordinator will call you up to schedule.  Virtual visits or after you leave the clinic:  Your provider has placed a follow-up order in the [employer] portal for scheduling your return appointment. A member of the scheduling team will contact you to schedule.  [employer] Scheduling: Timely scheduling through [employer] is advised to ensure appointment availability.   Call to schedule: You may schedule your follow-up appointment(s) by calling [phone], option 1.    How do I schedule my endoscopy or colonoscopy procedure:  If a procedure, such as a colonoscopy or upper endoscopy was ordered by your provider, the scheduling team will contact you to schedule this procedure. Or you may choose to call to schedule at   [phone], option 2.  Please allow 20-30 minutes when scheduling a procedure.    How do I get my blood  work done? To get your blood work done, you need to schedule a lab appointment at an [facility]. There are multiple ways to schedule:   At the clinic: The Clinic Coordinator you meet after your visit can help you schedule a lab appointment.   [employer] scheduling: [employer] offers online lab scheduling at all [facility] laboratory locations.   Call to schedule: You can call [phone] to schedule your lab appointment.    How do I schedule my imaging study: To schedule imaging studies, such as CT scans, ultrasounds, MRIs, or X-rays, contact Imaging Services at [phone].    How do I schedule a referral to another doctor: If your provider recommended a referral to another specialist(s), the referral order was placed by your provider. You will receive a phone call to schedule this referral, or you may choose to call the number attached to the referral to self-schedule.    For Post-Visit Question(s):  For any inquiries following today's visit:  Please utilize [employer] messaging and allow 48 hours for reply or contact the Call Center during normal business hours at [phone], option 3.  For Emergent After-hours questions, contact the On-Call GI Fellow through the [facility]  at [phone].  In addition, you may contact your Nurse directly using the provided contact information.    Test Results:  Test results will be accessible via [employer] in compliance with the 21st Century Cures Act. This means that your results will be available to you at the same time as your provider. Often you may see your results before your provider does. Results are reviewed by staff within two weeks with communication follow-up. Results may be released in the patient portal prior to your care team review.    Prescription Refill(s):  Medication prescribed by your provider will be addressed during your visit. For future refills, please coordinate with your pharmacy. If you have not had a recent  clinic visit or routine labs, for your safety, your provider may not be able to refill your prescription.

## 2025-06-24 ENCOUNTER — APPOINTMENT (OUTPATIENT)
Dept: GENERAL RADIOLOGY | Facility: CLINIC | Age: 25
End: 2025-06-24
Attending: EMERGENCY MEDICINE
Payer: MEDICAID

## 2025-06-24 ENCOUNTER — RESULTS FOLLOW-UP (OUTPATIENT)
Dept: ONCOLOGY | Facility: CLINIC | Age: 25
End: 2025-06-24

## 2025-06-24 ENCOUNTER — HOSPITAL ENCOUNTER (EMERGENCY)
Facility: CLINIC | Age: 25
Discharge: HOME | End: 2025-06-24
Attending: EMERGENCY MEDICINE | Admitting: EMERGENCY MEDICINE
Payer: MEDICAID

## 2025-06-24 VITALS
DIASTOLIC BLOOD PRESSURE: 84 MMHG | BODY MASS INDEX: 26.77 KG/M2 | OXYGEN SATURATION: 95 % | HEIGHT: 64 IN | TEMPERATURE: 98.7 F | RESPIRATION RATE: 12 BRPM | WEIGHT: 156.8 LBS | SYSTOLIC BLOOD PRESSURE: 150 MMHG | HEART RATE: 92 BPM

## 2025-06-24 DIAGNOSIS — S62.102A WRIST FRACTURE, LEFT, CLOSED, INITIAL ENCOUNTER: Primary | ICD-10-CM

## 2025-06-24 DIAGNOSIS — D57.00 SICKLE CELL PAIN CRISIS (H): ICD-10-CM

## 2025-06-24 LAB
ALBUMIN SERPL BCG-MCNC: 4.7 G/DL (ref 3.5–5.2)
ALBUMIN UR-MCNC: NEGATIVE MG/DL
ALP SERPL-CCNC: 61 U/L (ref 40–150)
ALT SERPL W P-5'-P-CCNC: 43 U/L (ref 0–50)
ANION GAP SERPL CALCULATED.3IONS-SCNC: 14 MMOL/L (ref 7–15)
APPEARANCE UR: CLEAR
AST SERPL W P-5'-P-CCNC: 65 U/L (ref 0–45)
BACTERIA #/AREA URNS HPF: ABNORMAL /HPF
BASOPHILS # BLD AUTO: 0.2 10E3/UL (ref 0–0.2)
BASOPHILS NFR BLD AUTO: 2 %
BILIRUB SERPL-MCNC: 2.5 MG/DL
BILIRUB UR QL STRIP: NEGATIVE
BUN SERPL-MCNC: 9.9 MG/DL (ref 6–20)
CALCIUM SERPL-MCNC: 8.8 MG/DL (ref 8.8–10.4)
CHLORIDE SERPL-SCNC: 104 MMOL/L (ref 98–107)
COLOR UR AUTO: ABNORMAL
CREAT SERPL-MCNC: 0.52 MG/DL (ref 0.51–0.95)
EGFRCR SERPLBLD CKD-EPI 2021: >90 ML/MIN/1.73M2
EOSINOPHIL # BLD AUTO: 0.4 10E3/UL (ref 0–0.7)
EOSINOPHIL NFR BLD AUTO: 3 %
ERYTHROCYTE [DISTWIDTH] IN BLOOD BY AUTOMATED COUNT: 24.1 % (ref 10–15)
GLUCOSE SERPL-MCNC: 97 MG/DL (ref 70–99)
GLUCOSE UR STRIP-MCNC: NEGATIVE MG/DL
HCG SERPL QL: NEGATIVE
HCO3 SERPL-SCNC: 21 MMOL/L (ref 22–29)
HCT VFR BLD AUTO: 20 % (ref 35–47)
HGB BLD-MCNC: 7.3 G/DL (ref 11.7–15.7)
HGB UR QL STRIP: NEGATIVE
IGA SERPL-MCNC: 195 MG/DL (ref 84–499)
IMM GRANULOCYTES # BLD: 0.1 10E3/UL
IMM GRANULOCYTES NFR BLD: 1 %
KETONES UR STRIP-MCNC: NEGATIVE MG/DL
LEUKOCYTE ESTERASE UR QL STRIP: NEGATIVE
LIPASE SERPL-CCNC: 30 U/L (ref 13–60)
LYMPHOCYTES # BLD AUTO: 2.2 10E3/UL (ref 0.8–5.3)
LYMPHOCYTES NFR BLD AUTO: 20 %
MCH RBC QN AUTO: 30.8 PG (ref 26.5–33)
MCHC RBC AUTO-ENTMCNC: 36.5 G/DL (ref 31.5–36.5)
MCV RBC AUTO: 84 FL (ref 78–100)
MONOCYTES # BLD AUTO: 1.2 10E3/UL (ref 0–1.3)
MONOCYTES NFR BLD AUTO: 11 %
MUCOUS THREADS #/AREA URNS LPF: PRESENT /LPF
NEUTROPHILS # BLD AUTO: 7.1 10E3/UL (ref 1.6–8.3)
NEUTROPHILS NFR BLD AUTO: 64 %
NITRATE UR QL: NEGATIVE
NRBC # BLD AUTO: 0.3 10E3/UL
NRBC BLD AUTO-RTO: 2 /100
PH UR STRIP: 6 [PH] (ref 5–7)
PLATELET # BLD AUTO: 476 10E3/UL (ref 150–450)
POTASSIUM SERPL-SCNC: 3.8 MMOL/L (ref 3.4–5.3)
PROT SERPL-MCNC: 8 G/DL (ref 6.4–8.3)
RBC # BLD AUTO: 2.37 10E6/UL (ref 3.8–5.2)
RBC URINE: <1 /HPF
RETICS # AUTO: 0.42 10E6/UL (ref 0.03–0.1)
RETICS/RBC NFR AUTO: 17.6 % (ref 0.5–2)
SODIUM SERPL-SCNC: 139 MMOL/L (ref 135–145)
SP GR UR STRIP: 1.01 (ref 1–1.03)
SQUAMOUS EPITHELIAL: <1 /HPF
TTG IGA SER-ACNC: 0.5 U/ML
TTG IGG SER-ACNC: 0.9 U/ML
UROBILINOGEN UR STRIP-MCNC: NORMAL MG/DL
WBC # BLD AUTO: 11.1 10E3/UL (ref 4–11)
WBC URINE: 1 /HPF

## 2025-06-24 PROCEDURE — 83690 ASSAY OF LIPASE: CPT | Performed by: EMERGENCY MEDICINE

## 2025-06-24 PROCEDURE — 258N000003 HC RX IP 258 OP 636: Performed by: EMERGENCY MEDICINE

## 2025-06-24 PROCEDURE — 250N000011 HC RX IP 250 OP 636: Mod: JZ | Performed by: EMERGENCY MEDICINE

## 2025-06-24 PROCEDURE — 99285 EMERGENCY DEPT VISIT HI MDM: CPT | Mod: 25 | Performed by: EMERGENCY MEDICINE

## 2025-06-24 PROCEDURE — 82310 ASSAY OF CALCIUM: CPT | Performed by: EMERGENCY MEDICINE

## 2025-06-24 PROCEDURE — 85004 AUTOMATED DIFF WBC COUNT: CPT | Performed by: EMERGENCY MEDICINE

## 2025-06-24 PROCEDURE — 96374 THER/PROPH/DIAG INJ IV PUSH: CPT | Performed by: EMERGENCY MEDICINE

## 2025-06-24 PROCEDURE — 85045 AUTOMATED RETICULOCYTE COUNT: CPT | Performed by: EMERGENCY MEDICINE

## 2025-06-24 PROCEDURE — 96361 HYDRATE IV INFUSION ADD-ON: CPT | Performed by: EMERGENCY MEDICINE

## 2025-06-24 PROCEDURE — 84703 CHORIONIC GONADOTROPIN ASSAY: CPT | Performed by: EMERGENCY MEDICINE

## 2025-06-24 PROCEDURE — 96376 TX/PRO/DX INJ SAME DRUG ADON: CPT | Performed by: EMERGENCY MEDICINE

## 2025-06-24 PROCEDURE — 36415 COLL VENOUS BLD VENIPUNCTURE: CPT | Performed by: EMERGENCY MEDICINE

## 2025-06-24 PROCEDURE — 73522 X-RAY EXAM HIPS BI 3-4 VIEWS: CPT

## 2025-06-24 PROCEDURE — 99285 EMERGENCY DEPT VISIT HI MDM: CPT | Performed by: EMERGENCY MEDICINE

## 2025-06-24 PROCEDURE — 81003 URINALYSIS AUTO W/O SCOPE: CPT | Performed by: EMERGENCY MEDICINE

## 2025-06-24 RX ORDER — KETOROLAC TROMETHAMINE 30 MG/ML
30 INJECTION, SOLUTION INTRAMUSCULAR; INTRAVENOUS ONCE
Status: COMPLETED | OUTPATIENT
Start: 2025-06-24 | End: 2025-06-24

## 2025-06-24 RX ORDER — ONDANSETRON 2 MG/ML
4 INJECTION INTRAMUSCULAR; INTRAVENOUS EVERY 30 MIN PRN
Status: DISCONTINUED | OUTPATIENT
Start: 2025-06-24 | End: 2025-06-24 | Stop reason: HOSPADM

## 2025-06-24 RX ORDER — HEPARIN SODIUM (PORCINE) LOCK FLUSH IV SOLN 100 UNIT/ML 100 UNIT/ML
100 SOLUTION INTRAVENOUS ONCE
Status: COMPLETED | OUTPATIENT
Start: 2025-06-24 | End: 2025-06-24

## 2025-06-24 RX ORDER — SODIUM CHLORIDE, SODIUM LACTATE, POTASSIUM CHLORIDE, CALCIUM CHLORIDE 600; 310; 30; 20 MG/100ML; MG/100ML; MG/100ML; MG/100ML
INJECTION, SOLUTION INTRAVENOUS CONTINUOUS
Status: DISCONTINUED | OUTPATIENT
Start: 2025-06-24 | End: 2025-06-24 | Stop reason: HOSPADM

## 2025-06-24 RX ADMIN — HYDROMORPHONE HYDROCHLORIDE 1 MG: 1 INJECTION, SOLUTION INTRAMUSCULAR; INTRAVENOUS; SUBCUTANEOUS at 18:47

## 2025-06-24 RX ADMIN — HEPARIN 100 UNITS: 100 SYRINGE at 20:58

## 2025-06-24 RX ADMIN — HYDROMORPHONE HYDROCHLORIDE 1 MG: 1 INJECTION, SOLUTION INTRAMUSCULAR; INTRAVENOUS; SUBCUTANEOUS at 17:46

## 2025-06-24 RX ADMIN — HYDROMORPHONE HYDROCHLORIDE 1 MG: 1 INJECTION, SOLUTION INTRAMUSCULAR; INTRAVENOUS; SUBCUTANEOUS at 19:55

## 2025-06-24 RX ADMIN — SODIUM CHLORIDE, SODIUM LACTATE, POTASSIUM CHLORIDE, AND CALCIUM CHLORIDE: .6; .31; .03; .02 INJECTION, SOLUTION INTRAVENOUS at 17:47

## 2025-06-24 ASSESSMENT — ACTIVITIES OF DAILY LIVING (ADL)
ADLS_ACUITY_SCORE: 58

## 2025-06-24 NOTE — ED PROVIDER NOTES
History     Chief Complaint   Patient presents with    Abdominal Pain     Sharp abdominal pain radiates back to both sides of hip, constant, no urinary pain, last BM was today     HPI  Jennifer Cervantes is a 25 year old female with a past medical history of sickle cell disease, asthma, CVA, anxiety, abdominal pain who presents to the emergency department with a chief complaint of abdominal pain.  It is sharp in nature.  It radiates to both sides of her hips.  She has had hip pain before, but this is worse.  No recent trauma reported.  She has not had recent imaging of her hips per her report.  It is constant.  No urinary symptoms.  Last bowel movement today.  Per chart review, patient was seen in the emergency department on 6/20 for similar symptoms.  Patient reports she has been taking all of her medications as directed at home.  She has already reached out to her care team for a follow-up appointment to see if there are additional medications to help manage her sickle cell disease and prevent her frequent ER visits.  Patient denies any chest pain, shortness of breath, fever, cough.    I have reviewed the Medications, Allergies, Past Medical and Surgical History, and Social History in the [employer] system.    Past Medical History:   Diagnosis Date    Anxiety     Bleeding disorder     Blood clotting disorder     Cerebral infarction (H) 2015    Cognitive developmental delay     low IQ. 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 side (H)     right hand contractures    Iron overload due to repeated red blood cell transfusions     Migraines     Multiple subsegmental pulmonary emboli without acute cor pulmonale (H) 02/01/2021    Oppositional defiant behavior     Presence of intrauterine contraceptive device 2/18/2020    Superficial venous thrombosis of arm, right 03/25/2021    Uncomplicated asthma       Past Surgical History:   Procedure Laterality Date    AS INSERT TUNNELED CV 2 CATH W/O PORT/PUMP      CHOLECYSTECTOMY      CV RIGHT HEART CATH MEASUREMENTS RECORDED N/A 11/18/2021    Procedure: Right Heart Cath;  Surgeon: Jackson Stauffer MD;  Location:  HEART CARDIAC CATH LAB    INSERT PORT VASCULAR ACCESS Left 4/21/2021    Procedure: INSERTION, VASCULAR ACCESS PORT (NOT SURE ON SIDE UNTIL REMOVAL);  Surgeon: Rajan More MD;  Location: [facility] OR    IR CHEST PORT PLACEMENT > 5 YRS OF AGE  4/21/2021    IR CVC NON TUNNEL LINE REMOVAL  5/6/2021    IR CVC NON TUNNEL PLACEMENT > 5 YRS  04/07/2020    IR CVC NON TUNNEL PLACEMENT > 5 YRS  4/30/2021    IR CVC NON TUNNEL PLACEMENT > 5 YRS  9/7/2022    IR PORT REMOVAL LEFT  4/21/2021    REMOVE PORT VASCULAR ACCESS Left 4/21/2021    Procedure: REMOVAL, VASCULAR ACCESS PORT LEFT;  Surgeon: Rajan More MD;  Location: [facility] OR    REPAIR TENDON ELBOW Right 10/02/2019    Procedure: Right Elbow Flexor Lengthening, Flexor Pronator Slide Of Wrist and Finger, Thumb Adductor Lengthening;  Surgeon: Anai Franco MD;  Location: [facility]    TONSILLECTOMY Bilateral 10/02/2019    Procedure: Bilateral Tonsillectomy;  Surgeon: Farhana Guy MD;  Location: [facility] OR    ZC BREAST REDUCTION (INCLUDES LIPO) TIER 3 Bilateral 04/23/2019     No current facility-administered medications for this encounter.     Current Outpatient Medications   Medication Sig Dispense Refill    albuterol (PROVENTIL) (2.5 MG/3ML) 0.083% neb solution Take 1 vial (2.5 mg) by nebulization every 6 hours as needed for shortness of breath, wheezing or cough. 90 mL 0    aspirin (ASA) 81 MG chewable tablet Take 1 tablet (81 mg) by mouth 2 times daily 60 tablet 11    azelastine (ASTELIN) 0.1 % nasal spray Twice daily, spray once into each nostril after blowing your nose. Stand still for 1-3 minutes after spraying into nostril, to avoid dripping. After that, your may blow your nose again, to clear the  remaining medication. 30 mL 4    budesonide-formoterol (SYMBICORT/BREYNA) 160-4.5 MCG/ACT Inhaler Inhale 2 puffs twice daily plus 1-2 puffs as needed. May use up to 12 puffs per day. 20.4 g 11    deferasirox (JADENU) 360 MG tablet Take 4 tablets (1,440 mg) by mouth daily. 120 tablet 11    Deferiprone, Twice Daily, 1000 MG TABS Take 2,000 mg by mouth 2 times daily. 150 tablet 3    diclofenac (VOLTAREN) 1 % topical gel Apply 4 g topically 4 times daily as needed (moderate knee pain). 350 g 1    diphenhydrAMINE (BENADRYL) 50 MG capsule Administer 12  hours pre - IV contrast injection and 2 hours prior to contrast. Patient must have a . 2 capsule 0    EPINEPHrine (ANY BX GENERIC EQUIV) 0.3 MG/0.3ML injection 2-pack Inject 0.3 mLs (0.3 mg) into the muscle as needed for anaphylaxis. 1 each 1    Fluticasone-Umeclidin-Vilant (TRELEGY ELLIPTA) 100-62.5-25 MCG/ACT oral inhaler Inhale 1 puff into the lungs daily. 60 each 2    hydroxyurea (HYDREA) 500 MG capsule Take 6 capsules (3,000 mg) by mouth daily 180 capsule 11    ibuprofen (ADVIL/MOTRIN) 800 MG tablet Take 800 mg by mouth every 8 hours as needed for moderate pain (Patient not taking: Reported on 6/23/2025)      ketotifen fumarate 0.035% 0.035 % SOLN ophthalmic solution Place 1 drop into both eyes 2 times daily as needed for itching. 10 mL 11    methocarbamol (ROBAXIN) 750 MG tablet Take 1 tablet (750 mg) by mouth 4 times daily as needed for muscle spasms (during sickle pain crises. Okay to take scheduled while in pain). 60 tablet 1    methylPREDNISolone (MEDROL) 32 MG tablet Take 1 tab 12 hours before appointment and 1 tab 2 hours prior to the procedure with IV contrast. 2 tablet 0    naloxone (NARCAN) 4 MG/0.1ML nasal spray Spray 4 mg into one nostril alternating nostrils as needed for opioid reversal. every 2-3 minutes until assistance arrives 0.2 mL 0    naproxen (NAPROSYN) 500 MG tablet Take 1 tablet (500 mg) by mouth daily as needed (pain). Take with food.  Use sparingly and avoid taking on the same days you take ibuprofen. 30 tablet 1    oxyCODONE IR (ROXICODONE) 10 MG tablet Take 1 tablet (10 mg) by mouth every 6 hours as needed for moderate to severe pain. (Goal of less than 4 per day) 12 tablet 0    pantoprazole (PROTONIX) 40 MG EC tablet Take 1 tablet (40 mg) by mouth daily. 30 tablet 0    sertraline (ZOLOFT) 50 MG tablet Take 1 tablet (50 mg) by mouth daily. 90 tablet 3     Allergies   Allergen Reactions    Contrast Dye Angioedema     Hives and breathing issues    Fish-Derived Products Hives    Seafood Hives    Adhesive Tape Hives     Primipore dressing causes hives    Gadolinium     Iodinated Contrast Media      Past medical history, past surgical history, medications, and allergies were reviewed with the patient. Additional pertinent items: None    Social History     Socioeconomic History    Marital status: Single     Spouse name: Not on file    Number of children: Not on file    Years of education: Not on file    Highest education level: Not on file   Occupational History    Not on file   Tobacco Use    Smoking status: Never     Passive exposure: Never    Smokeless tobacco: Never   Substance and Sexual Activity    Alcohol use: Not Currently     Alcohol/week: 0.0 standard drinks of alcohol    Drug use: Never    Sexual activity: Not Currently     Partners: Male     Birth control/protection: Implant   Other Topics Concern    Parent/sibling w/ CABG, MI or angioplasty before 65F 55M? Not Asked   Social History Narrative    Lives with mom and extended family (mom is her PCA and ILS worker). She does still need a lot of help and does not live independently due to her significant SCD comorbidities and cognitive delay from stroke.     Social Drivers of Health     Financial Resource Strain: Low Risk  (12/7/2024)    Financial Resource Strain     Within the past 12 months, have you or your family members you live with been unable to get utilities (heat, electricity) when  it was really needed?: No   Food Insecurity: Low Risk  (12/7/2024)    Food Insecurity     Within the past 12 months, did you worry that your food would run out before you got money to buy more?: No     Within the past 12 months, did the food you bought just not last and you didn t have money to get more?: No   Transportation Needs: Low Risk  (12/7/2024)    Transportation Needs     Within the past 12 months, has lack of transportation kept you from medical appointments, getting your medicines, non-medical meetings or appointments, work, or from getting things that you need?: No   Physical Activity: Unknown (7/3/2024)    Exercise Vital Sign     Days of Exercise per Week: 1 day     Minutes of Exercise per Session: Not on file   Stress: No Stress Concern Present (7/3/2024)    [employer] [place] [employer] - Occupational Stress Questionnaire     Feeling of Stress : Only a little   Social Connections: Unknown (7/3/2024)    Social Connection and Isolation Panel [NHANES]     Frequency of Communication with Friends and Family: Not on file     Frequency of Social Gatherings with Friends and Family: Never     Attends Religion Services: Not on file     Active Member of Clubs or Organizations: Not on file     Attends Club or Organization Meetings: Not on file     Marital Status: Not on file   Interpersonal Safety: Low Risk  (2/12/2025)    Interpersonal Safety     Do you feel physically and emotionally safe where you currently live?: Yes     Within the past 12 months, have you been hit, slapped, kicked or otherwise physically hurt by someone?: No     Within the past 12 months, have you been humiliated or emotionally abused in other ways by your partner or ex-partner?: No   Housing Stability: Low Risk  (12/7/2024)    Housing Stability     Do you have housing? : Yes     Are you worried about losing your housing?: No   Recent Concern: Housing Stability - High Risk (11/28/2024)    Housing Stability     Do you have  "housing? : No     Are you worried about losing your housing?: No     Social history was reviewed with the patient. Additional pertinent items: None    Review of Systems  A medically appropriate review of systems was performed with pertinent positives and negatives noted in the HPI, and all other systems negative.    Physical Exam   BP: (!) 150/84  Pulse: 92  Temp: 98.7  F (37.1  C)  Resp: 12  Height: 162.6 cm (5' 4\")  Weight: 71.1 kg (156 lb 12.8 oz)  SpO2: 95 %      General: Well nourished, well developed, NAD  HEENT: EOMI, anicteric. NCAT, MMM  Neck: no jugular venous distension, supple, nl ROM  Cardiac: Regular rate, extremities well-perfused  Pulm: NLB, normal RR  Skin: Warm and dry to the touch.  No rash  Extremities: No LE edema, no cyanosis, w/w/p  Neuro: A&Ox3, no gross focal deficits    ED Course        Procedures                        Labs Ordered and Resulted from Time of ED Arrival to Time of ED Departure   ROUTINE UA WITH MICROSCOPIC REFLEX TO CULTURE - Abnormal       Result Value    Color Urine Light Yellow      Appearance Urine Clear      Glucose Urine Negative      Bilirubin Urine Negative      Ketones Urine Negative      Specific Gravity Urine 1.010      Blood Urine Negative      pH Urine 6.0      Protein Albumin Urine Negative      Urobilinogen Urine Normal      Nitrite Urine Negative      Leukocyte Esterase Urine Negative      Bacteria Urine Few (*)     Mucus Urine Present (*)     RBC Urine <1      WBC Urine 1      Squamous Epithelials Urine <1     COMPREHENSIVE METABOLIC PANEL - Abnormal    Sodium 139      Potassium 3.8      Carbon Dioxide (CO2) 21 (*)     Anion Gap 14      Urea Nitrogen 9.9      Creatinine 0.52      GFR Estimate >90      Calcium 8.8      Chloride 104      Glucose 97      Alkaline Phosphatase 61      AST 65 (*)     ALT 43      Protein Total 8.0      Albumin 4.7      Bilirubin Total 2.5 (*)    RETICULOCYTE COUNT - Abnormal    % Reticulocyte 17.6 (*)     Absolute Reticulocyte " 0.423 (*)    CBC WITH PLATELETS AND DIFFERENTIAL - Abnormal    WBC Count 11.1 (*)     RBC Count 2.37 (*)     Hemoglobin 7.3 (*)     Hematocrit 20.0 (*)     MCV 84      MCH 30.8      MCHC 36.5      RDW 24.1 (*)     Platelet Count 476 (*)     % Neutrophils 64      % Lymphocytes 20      % Monocytes 11      % Eosinophils 3      % Basophils 2      % Immature Granulocytes 1      NRBCs per 100 WBC 2 (*)     Absolute Neutrophils 7.1      Absolute Lymphocytes 2.2      Absolute Monocytes 1.2      Absolute Eosinophils 0.4      Absolute Basophils 0.2      Absolute Immature Granulocytes 0.1      Absolute NRBCs 0.3     LIPASE - Normal    Lipase 30     HCG QUALITATIVE PREGNANCY - Normal    hCG Serum Qualitative Negative              Results for orders placed or performed during the hospital encounter of 06/24/25 (from the past 24 hours)   UA with Microscopic reflex to Culture    Specimen: Urine, Midstream   Result Value Ref Range    Color Urine Light Yellow Colorless, Straw, Light Yellow, Yellow    Appearance Urine Clear Clear    Glucose Urine Negative Negative mg/dL    Bilirubin Urine Negative Negative    Ketones Urine Negative Negative mg/dL    Specific Gravity Urine 1.010 1.003 - 1.035    Blood Urine Negative Negative    pH Urine 6.0 5.0 - 7.0    Protein Albumin Urine Negative Negative mg/dL    Urobilinogen Urine Normal Normal mg/dL    Nitrite Urine Negative Negative    Leukocyte Esterase Urine Negative Negative    Bacteria Urine Few (A) None Seen /HPF    Mucus Urine Present (A) None Seen /LPF    RBC Urine <1 <=2 /HPF    WBC Urine 1 <=5 /HPF    Squamous Epithelials Urine <1 <=1 /HPF    Narrative    Urine Culture not indicated   CBC with platelets differential    Narrative    The following orders were created for panel order CBC with platelets differential.  Procedure                               Abnormality         Status                     ---------                               -----------         ------                      CBC with platelets and ...[[id number]]  Abnormal            Final result                 Please view results for these tests on the individual orders.   Comprehensive metabolic panel   Result Value Ref Range    Sodium 139 135 - 145 mmol/L    Potassium 3.8 3.4 - 5.3 mmol/L    Carbon Dioxide (CO2) 21 (L) 22 - 29 mmol/L    Anion Gap 14 7 - 15 mmol/L    Urea Nitrogen 9.9 6.0 - 20.0 mg/dL    Creatinine 0.52 0.51 - 0.95 mg/dL    GFR Estimate >90 >60 mL/min/1.73m2    Calcium 8.8 8.8 - 10.4 mg/dL    Chloride 104 98 - 107 mmol/L    Glucose 97 70 - 99 mg/dL    Alkaline Phosphatase 61 40 - 150 U/L    AST 65 (H) 0 - 45 U/L    ALT 43 0 - 50 U/L    Protein Total 8.0 6.4 - 8.3 g/dL    Albumin 4.7 3.5 - 5.2 g/dL    Bilirubin Total 2.5 (H) <=1.2 mg/dL   Lipase   Result Value Ref Range    Lipase 30 13 - 60 U/L   Reticulocyte count   Result Value Ref Range    % Reticulocyte 17.6 (H) 0.5 - 2.0 %    Absolute Reticulocyte 0.423 (H) 0.025 - 0.095 10e6/uL   HCG qualitative Blood   Result Value Ref Range    hCG Serum Qualitative Negative Negative   CBC with platelets and differential   Result Value Ref Range    WBC Count 11.1 (H) 4.0 - 11.0 10e3/uL    RBC Count 2.37 (L) 3.80 - 5.20 10e6/uL    Hemoglobin 7.3 (L) 11.7 - 15.7 g/dL    Hematocrit 20.0 (L) 35.0 - 47.0 %    MCV 84 78 - 100 fL    MCH 30.8 26.5 - 33.0 pg    MCHC 36.5 31.5 - 36.5 g/dL    RDW 24.1 (H) 10.0 - 15.0 %    Platelet Count 476 (H) 150 - 450 10e3/uL    % Neutrophils 64 %    % Lymphocytes 20 %    % Monocytes 11 %    % Eosinophils 3 %    % Basophils 2 %    % Immature Granulocytes 1 %    NRBCs per 100 WBC 2 (H) <1 /100    Absolute Neutrophils 7.1 1.6 - 8.3 10e3/uL    Absolute Lymphocytes 2.2 0.8 - 5.3 10e3/uL    Absolute Monocytes 1.2 0.0 - 1.3 10e3/uL    Absolute Eosinophils 0.4 0.0 - 0.7 10e3/uL    Absolute Basophils 0.2 0.0 - 0.2 10e3/uL    Absolute Immature Granulocytes 0.1 <=0.4 10e3/uL    Absolute NRBCs 0.3 10e3/uL   XR Hip Bilateral 1 View    Narrative    EXAM: XR  PELVIS W 2 VW HIPS BILATERAL  LOCATION: [facility]  DATE: 6/24/2025    INDICATION: pain  COMPARISON: 4/6/2025, 5/9/2025      Impression    IMPRESSION: No acute fracture or malalignment. There is normal joint spacing. Dysplastic appearance of the right hip.     *Note: Due to a large number of results and/or encounters for the requested time period, some results have not been displayed. A complete set of results can be found in Results Review.       Labs, vital signs, and imaging studies were reviewed by me.    Medications   ondansetron (ZOFRAN) injection 4 mg (has no administration in time range)   lactated ringers infusion (0 mLs Intravenous Stopped 6/24/25 1935)   HYDROmorphone (DILAUDID) injection 1 mg (1 mg Intravenous $Given 6/24/25 1955)   ketorolac (TORADOL) injection 30 mg (30 mg Intravenous Not Given 6/24/25 1759)       Assessments & Plan (with Medical Decision Making)   Jennifer Cervantes is a 25 year old female who presents to the emergency department with abdominal pain.  Differential diagnosis includes sickle cell pain crisis, pancreatitis, gastritis/gastroenteritis, constipation, musculoskeletal pain.  Labs ordered to further evaluate the patient in the emergency department.  Medications ordered for symptomatic relief per patient's ER care plan.  No acute symptoms of acute chest.  Patient's worsening hip pain may be secondary to sickle cell pain crisis, however, avascular necrosis would be on the differential as well.  X-ray ordered to further evaluate the patient in the emergency department.    Laboratory workup is remarkable for hemoglobin at baseline.  Urinalysis does not appear consistent with UTI.  Reticulocyte count 17.6 (down from 25.3 when it was last checked 4 days ago), metabolic panel shows bicarb 21 (which appears to be near patient's baseline), AST slightly elevated at 65 (this has been mildly elevated in the past), bilirubin elevated at 2.5,  improved from 3.3 when it was last checked 4 days ago.    X-ray shows no acute fracture or malalignment, there is dysplastic appearance of the right hip    Critical care was not performed.     Medical Decision Making  The patient's presentation was of high complexity (a chronic illness severe exacerbation, progression, or side effect of treatment).    The patient's evaluation involved:  review of external note(s) from 1 sources (care plan, hematology)  ordering and/or review of 3+ test(s) in this encounter (see separate area of note for details)    The patient's management necessitated moderate risk (prescription drug management including medications given in the ED), high risk (a parenteral controlled substance), and high risk (a decision regarding hospitalization).    I have reviewed the nursing notes.    I have reviewed the findings, diagnosis, plan and need for follow up with the patient.    After medications were given in the emergency department, the patient feels better and would like to be discharged home    Patient to be discharged home. Advised to follow up with PCP within 1 week. To return to ER immediately with any new/worsening symptoms. Plan of care discussed with patient who expresses understanding and agrees with plan of care.    New Prescriptions    No medications on file       Final diagnoses:   Sickle cell pain crisis (H)       GREG HALL MD  6/24/2025   [facility] EMERGENCY DEPARTMENT       Greg Hall MD  06/24/25 2049

## 2025-06-24 NOTE — TELEPHONE ENCOUNTER
Prior Authorization Approval    Medication: TRELEGY ELLIPTA 100-62.5-25 MCG/ACT IN AEPB  Authorization Effective Date: 6/23/2025  Authorization Expiration Date: 6/23/2026  Approved Dose/Quantity:   Reference #:     Insurance Company: [employer] for [place] Medicaid Phone [phone] Fax [phone]  Expected CoPay: $    CoPay Card Available:      Financial Assistance Needed:   Which Pharmacy is filling the prescription: [facility] [place], [place] - [address] [place] [address] [phone]  Pharmacy Notified: Yes  Patient Notified: **Instructed pharmacy to notify patient when script is ready to /ship.**

## 2025-06-24 NOTE — ED TRIAGE NOTES
Sharp abdominal pain radiates back to both sides of hip, constant, no urinary pain, last BM was today     Triage Assessment (Adult)       Row Name 06/24/25 1847          Triage Assessment    Airway WDL WDL        Respiratory WDL    Respiratory WDL WDL        Skin Circulation/Temperature WDL    Skin Circulation/Temperature WDL WDL        Cardiac WDL    Cardiac WDL WDL        Peripheral/Neurovascular WDL    Peripheral Neurovascular WDL WDL        Cognitive/Neuro/Behavioral WDL    Cognitive/Neuro/Behavioral WDL WDL

## 2025-06-25 ENCOUNTER — PATIENT OUTREACH (OUTPATIENT)
Dept: CARE COORDINATION | Facility: CLINIC | Age: 25
End: 2025-06-25

## 2025-06-25 ENCOUNTER — THERAPY VISIT (OUTPATIENT)
Dept: PHYSICAL THERAPY | Facility: CLINIC | Age: 25
End: 2025-06-25
Payer: MEDICAID

## 2025-06-25 ENCOUNTER — INFUSION THERAPY VISIT (OUTPATIENT)
Dept: INFUSION THERAPY | Facility: CLINIC | Age: 25
End: 2025-06-25
Attending: PEDIATRICS
Payer: MEDICAID

## 2025-06-25 ENCOUNTER — NURSE TRIAGE (OUTPATIENT)
Dept: ONCOLOGY | Facility: CLINIC | Age: 25
End: 2025-06-25

## 2025-06-25 VITALS
TEMPERATURE: 98.8 F | RESPIRATION RATE: 16 BRPM | HEART RATE: 88 BPM | OXYGEN SATURATION: 93 % | DIASTOLIC BLOOD PRESSURE: 78 MMHG | SYSTOLIC BLOOD PRESSURE: 126 MMHG

## 2025-06-25 DIAGNOSIS — M25.562 ACUTE PAIN OF LEFT KNEE: Primary | ICD-10-CM

## 2025-06-25 DIAGNOSIS — D57.00 SICKLE CELL PAIN CRISIS (H): Primary | ICD-10-CM

## 2025-06-25 DIAGNOSIS — G81.10 SPASTIC HEMIPLEGIA, UNSPECIFIED ETIOLOGY, UNSPECIFIED LATERALITY (H): ICD-10-CM

## 2025-06-25 PROCEDURE — 96375 TX/PRO/DX INJ NEW DRUG ADDON: CPT

## 2025-06-25 PROCEDURE — 250N000013 HC RX MED GY IP 250 OP 250 PS 637: Performed by: PEDIATRICS

## 2025-06-25 PROCEDURE — 97112 NEUROMUSCULAR REEDUCATION: CPT | Mod: GP

## 2025-06-25 PROCEDURE — 96374 THER/PROPH/DIAG INJ IV PUSH: CPT

## 2025-06-25 PROCEDURE — 96376 TX/PRO/DX INJ SAME DRUG ADON: CPT

## 2025-06-25 PROCEDURE — 258N000003 HC RX IP 258 OP 636: Performed by: PEDIATRICS

## 2025-06-25 PROCEDURE — 97110 THERAPEUTIC EXERCISES: CPT | Mod: GP

## 2025-06-25 PROCEDURE — 250N000011 HC RX IP 250 OP 636: Performed by: PEDIATRICS

## 2025-06-25 PROCEDURE — 96361 HYDRATE IV INFUSION ADD-ON: CPT

## 2025-06-25 RX ORDER — KETOROLAC TROMETHAMINE 30 MG/ML
30 INJECTION, SOLUTION INTRAMUSCULAR; INTRAVENOUS ONCE
Status: COMPLETED | OUTPATIENT
Start: 2025-06-25 | End: 2025-06-25

## 2025-06-25 RX ORDER — KETOROLAC TROMETHAMINE 30 MG/ML
30 INJECTION, SOLUTION INTRAMUSCULAR; INTRAVENOUS ONCE
Start: 2025-08-01 | End: 2025-08-01

## 2025-06-25 RX ORDER — HEPARIN SODIUM (PORCINE) LOCK FLUSH IV SOLN 100 UNIT/ML 100 UNIT/ML
5 SOLUTION INTRAVENOUS
OUTPATIENT
Start: 2025-08-01

## 2025-06-25 RX ORDER — HEPARIN SODIUM,PORCINE 10 UNIT/ML
5 VIAL (ML) INTRAVENOUS
OUTPATIENT
Start: 2025-08-01

## 2025-06-25 RX ORDER — DIPHENHYDRAMINE HCL 25 MG
50 CAPSULE ORAL
Start: 2025-08-01

## 2025-06-25 RX ORDER — ONDANSETRON 8 MG/1
8 TABLET, FILM COATED ORAL
Start: 2025-08-01

## 2025-06-25 RX ORDER — ONDANSETRON 2 MG/ML
8 INJECTION INTRAMUSCULAR; INTRAVENOUS EVERY 6 HOURS PRN
Status: DISCONTINUED | OUTPATIENT
Start: 2025-06-25 | End: 2025-06-25 | Stop reason: HOSPADM

## 2025-06-25 RX ORDER — ONDANSETRON 2 MG/ML
8 INJECTION INTRAMUSCULAR; INTRAVENOUS EVERY 6 HOURS PRN
Start: 2025-08-01

## 2025-06-25 RX ORDER — DIPHENHYDRAMINE HCL 25 MG
50 CAPSULE ORAL
Status: COMPLETED | OUTPATIENT
Start: 2025-06-25 | End: 2025-06-25

## 2025-06-25 RX ORDER — HEPARIN SODIUM (PORCINE) LOCK FLUSH IV SOLN 100 UNIT/ML 100 UNIT/ML
5 SOLUTION INTRAVENOUS
Status: DISCONTINUED | OUTPATIENT
Start: 2025-06-25 | End: 2025-06-25 | Stop reason: HOSPADM

## 2025-06-25 RX ADMIN — HYDROMORPHONE HYDROCHLORIDE 1 MG: 1 INJECTION, SOLUTION INTRAMUSCULAR; INTRAVENOUS; SUBCUTANEOUS at 15:39

## 2025-06-25 RX ADMIN — SODIUM CHLORIDE, POTASSIUM CHLORIDE, SODIUM LACTATE AND CALCIUM CHLORIDE 1000 ML: 600; 310; 30; 20 INJECTION, SOLUTION INTRAVENOUS at 13:05

## 2025-06-25 RX ADMIN — HYDROMORPHONE HYDROCHLORIDE 1 MG: 1 INJECTION, SOLUTION INTRAMUSCULAR; INTRAVENOUS; SUBCUTANEOUS at 14:34

## 2025-06-25 RX ADMIN — ONDANSETRON 8 MG: 2 INJECTION INTRAMUSCULAR; INTRAVENOUS at 13:10

## 2025-06-25 RX ADMIN — HEPARIN 5 ML: 100 SYRINGE at 15:58

## 2025-06-25 RX ADMIN — KETOROLAC TROMETHAMINE 30 MG: 30 INJECTION, SOLUTION INTRAMUSCULAR at 14:36

## 2025-06-25 RX ADMIN — HYDROMORPHONE HYDROCHLORIDE 1 MG: 1 INJECTION, SOLUTION INTRAMUSCULAR; INTRAVENOUS; SUBCUTANEOUS at 13:10

## 2025-06-25 RX ADMIN — DIPHENHYDRAMINE HYDROCHLORIDE 50 MG: 25 CAPSULE ORAL at 13:09

## 2025-06-25 ASSESSMENT — PAIN SCALES - GENERAL: PAINLEVEL_OUTOF10: SEVERE PAIN (8)

## 2025-06-25 NOTE — TELEPHONE ENCOUNTER
Situation: Jennifer  calling about Sickle Cell Pain Crisis, requesting to be added on for IV fluids and pain medicine     Background:      Patient's last infusion was 6/24/2025 n ED  Last clinic visit date:5/23/2025 Patricia Mantilla  Does patient have active treatment plan? Yes     Assessment of Symptoms:  Onset/Duration of symptoms: 2 day     Is it typical sickle cell pain? Yes  Location: pelvic bone and all around hip  Character: sharp   Intensity: 6/10     Any radiation of pain, numbness, tingling, weakness, warmth, swelling, discoloration of arms or legs?No     Fever? No  Chest Pain Present: No  Shortness of breath: No     Other home therapies tried: HEAT/HEATING PAD and WARM BATH      Last home medication taken and when: Last night around 11pm oxycodone     Any Refills Needed?: No     Does patient have transportation & length of time to get to clinic: Yes for a physical therapy appt at 9:40am at [facility]    Recommendations:   Paged Patricia Mantilla CNP if ok to add on for IVF/pain today.   Per Patricia Higuera to add onto wait list.     0827 Offer for infusion appt at [facility] today at 1:00pm, pt in agreement with plan. Will need a ride home from [facility] since it is an add on appointment to same day appt.   Pt already has a ride to the area for a 9:40am appt with Physical Therapy.

## 2025-06-25 NOTE — DISCHARGE INSTRUCTIONS
TODAY'S VISIT:  You were seen today for sickle cell pain   -   - If you had any labs or imaging/radiology tests performed today, you should also discuss these tests with your usual provider.     FOLLOW-UP:  Please make an appointment to follow up with:  - Your Primary Care Provider. If you do not have a PCP, please call the Primary Care Center (phone: [phone] for an appointment  -  your hematology team    - Have your provider review the results from today's visit with you again to make sure no further follow-up or additional testing is needed based on those results.     RETURN TO THE EMERGENCY DEPARTMENT  Return to the Emergency Department at any time for any new or worsening symptoms or any concerns.

## 2025-06-25 NOTE — PROGRESS NOTES
Ambulatory Care Management- Transportation Support  Specialty SW - [facility]     Data/Intervention:  Patient Name:    Jennifer Cervantes     /Age: 2000 (25 year old)     [facility] team member assisting Specialty SW with transportation request.      Assessment:  CHW/CTA scheduled online [employer] to arrange medical appointment transportation in conjunction with patient's insurance. Your reference number is [id number]    Taxi company: [employer]    Patient will need to call above taxi company at phone number: [phone] when ready for return ride home.      Plan:  Patient is aware of the transportation plan.  available to assist with any other needs.      Maritza Vick MA

## 2025-06-25 NOTE — PROGRESS NOTES
Jennifer presents today for Fluids and pain meds.   Patient seen by provider today: No   present during visit today: Not Applicable.  Note: Pt complained of pain level up to 8, but it was down to a 4 after pain meds.  ?  Intravenous Access:  No Intravenous access/labs at this visit.  Peripheral IV placed.  Treatment Conditions:  Not Applicable.  ?  Post Infusion Assessment:  Patient tolerated infusion without incident.  ?  Discharge Plan:   Patient and/or family verbalized understanding of discharge instructions and all questions answered.  ?  Magy Roberto RN

## 2025-06-26 ENCOUNTER — RESULTS FOLLOW-UP (OUTPATIENT)
Dept: GASTROENTEROLOGY | Facility: CLINIC | Age: 25
End: 2025-06-26

## 2025-06-27 ENCOUNTER — APPOINTMENT (OUTPATIENT)
Dept: GENERAL RADIOLOGY | Facility: CLINIC | Age: 25
End: 2025-06-27
Attending: EMERGENCY MEDICINE
Payer: MEDICAID

## 2025-06-27 ENCOUNTER — APPOINTMENT (OUTPATIENT)
Dept: ULTRASOUND IMAGING | Facility: CLINIC | Age: 25
End: 2025-06-27
Attending: EMERGENCY MEDICINE
Payer: MEDICAID

## 2025-06-27 ENCOUNTER — HOSPITAL ENCOUNTER (EMERGENCY)
Facility: CLINIC | Age: 25
Discharge: HOME | End: 2025-06-27
Attending: EMERGENCY MEDICINE | Admitting: EMERGENCY MEDICINE
Payer: MEDICAID

## 2025-06-27 VITALS
RESPIRATION RATE: 18 BRPM | TEMPERATURE: 98 F | WEIGHT: 156 LBS | HEART RATE: 68 BPM | SYSTOLIC BLOOD PRESSURE: 117 MMHG | BODY MASS INDEX: 26.63 KG/M2 | HEIGHT: 64 IN | OXYGEN SATURATION: 95 % | DIASTOLIC BLOOD PRESSURE: 70 MMHG

## 2025-06-27 DIAGNOSIS — D57.00 SICKLE CELL PAIN CRISIS (H): ICD-10-CM

## 2025-06-27 LAB
ALBUMIN SERPL BCG-MCNC: 4.7 G/DL (ref 3.5–5.2)
ALP SERPL-CCNC: 61 U/L (ref 40–150)
ALT SERPL W P-5'-P-CCNC: 33 U/L (ref 0–50)
ANION GAP SERPL CALCULATED.3IONS-SCNC: 12 MMOL/L (ref 7–15)
AST SERPL W P-5'-P-CCNC: 54 U/L (ref 0–45)
BASOPHILS # BLD AUTO: 0.2 10E3/UL (ref 0–0.2)
BASOPHILS NFR BLD AUTO: 2 %
BILIRUB SERPL-MCNC: 2.4 MG/DL
BUN SERPL-MCNC: 5.8 MG/DL (ref 6–20)
CALCIUM SERPL-MCNC: 9 MG/DL (ref 8.8–10.4)
CHLORIDE SERPL-SCNC: 105 MMOL/L (ref 98–107)
CREAT SERPL-MCNC: 0.5 MG/DL (ref 0.51–0.95)
EGFRCR SERPLBLD CKD-EPI 2021: >90 ML/MIN/1.73M2
EOSINOPHIL # BLD AUTO: 0.3 10E3/UL (ref 0–0.7)
EOSINOPHIL NFR BLD AUTO: 2 %
ERYTHROCYTE [DISTWIDTH] IN BLOOD BY AUTOMATED COUNT: 25.2 % (ref 10–15)
GLUCOSE SERPL-MCNC: 84 MG/DL (ref 70–99)
HCG SERPL QL: NEGATIVE
HCO3 SERPL-SCNC: 22 MMOL/L (ref 22–29)
HCT VFR BLD AUTO: 22.4 % (ref 35–47)
HGB BLD-MCNC: 7.9 G/DL (ref 11.7–15.7)
IMM GRANULOCYTES # BLD: 0.1 10E3/UL
IMM GRANULOCYTES NFR BLD: 0 %
LYMPHOCYTES # BLD AUTO: 2.5 10E3/UL (ref 0.8–5.3)
LYMPHOCYTES NFR BLD AUTO: 22 %
MCH RBC QN AUTO: 31.2 PG (ref 26.5–33)
MCHC RBC AUTO-ENTMCNC: 35.3 G/DL (ref 31.5–36.5)
MCV RBC AUTO: 89 FL (ref 78–100)
MONOCYTES # BLD AUTO: 0.8 10E3/UL (ref 0–1.3)
MONOCYTES NFR BLD AUTO: 8 %
NEUTROPHILS # BLD AUTO: 7.3 10E3/UL (ref 1.6–8.3)
NEUTROPHILS NFR BLD AUTO: 66 %
NRBC # BLD AUTO: 0.2 10E3/UL
NRBC BLD AUTO-RTO: 1 /100
PLATELET # BLD AUTO: 526 10E3/UL (ref 150–450)
POTASSIUM SERPL-SCNC: 3.7 MMOL/L (ref 3.4–5.3)
PROT SERPL-MCNC: 8 G/DL (ref 6.4–8.3)
RBC # BLD AUTO: 2.53 10E6/UL (ref 3.8–5.2)
RETICS # AUTO: 0.58 10E6/UL (ref 0.03–0.1)
RETICS/RBC NFR AUTO: 22.9 % (ref 0.5–2)
SODIUM SERPL-SCNC: 139 MMOL/L (ref 135–145)
WBC # BLD AUTO: 11.2 10E3/UL (ref 4–11)

## 2025-06-27 PROCEDURE — 96376 TX/PRO/DX INJ SAME DRUG ADON: CPT | Performed by: EMERGENCY MEDICINE

## 2025-06-27 PROCEDURE — 96374 THER/PROPH/DIAG INJ IV PUSH: CPT | Performed by: EMERGENCY MEDICINE

## 2025-06-27 PROCEDURE — 250N000011 HC RX IP 250 OP 636: Performed by: EMERGENCY MEDICINE

## 2025-06-27 PROCEDURE — 84155 ASSAY OF PROTEIN SERUM: CPT | Performed by: EMERGENCY MEDICINE

## 2025-06-27 PROCEDURE — 84703 CHORIONIC GONADOTROPIN ASSAY: CPT | Performed by: EMERGENCY MEDICINE

## 2025-06-27 PROCEDURE — 36415 COLL VENOUS BLD VENIPUNCTURE: CPT | Performed by: EMERGENCY MEDICINE

## 2025-06-27 PROCEDURE — 73630 X-RAY EXAM OF FOOT: CPT | Mod: RT

## 2025-06-27 PROCEDURE — 99284 EMERGENCY DEPT VISIT MOD MDM: CPT | Performed by: EMERGENCY MEDICINE

## 2025-06-27 PROCEDURE — 96361 HYDRATE IV INFUSION ADD-ON: CPT | Performed by: EMERGENCY MEDICINE

## 2025-06-27 PROCEDURE — 85004 AUTOMATED DIFF WBC COUNT: CPT | Performed by: EMERGENCY MEDICINE

## 2025-06-27 PROCEDURE — 96375 TX/PRO/DX INJ NEW DRUG ADDON: CPT | Performed by: EMERGENCY MEDICINE

## 2025-06-27 PROCEDURE — 93971 EXTREMITY STUDY: CPT | Mod: 26 | Performed by: RADIOLOGY

## 2025-06-27 PROCEDURE — 99285 EMERGENCY DEPT VISIT HI MDM: CPT | Mod: 25 | Performed by: EMERGENCY MEDICINE

## 2025-06-27 PROCEDURE — 85045 AUTOMATED RETICULOCYTE COUNT: CPT | Performed by: EMERGENCY MEDICINE

## 2025-06-27 PROCEDURE — 258N000003 HC RX IP 258 OP 636: Performed by: EMERGENCY MEDICINE

## 2025-06-27 PROCEDURE — 93971 EXTREMITY STUDY: CPT | Mod: RT

## 2025-06-27 RX ORDER — ONDANSETRON 2 MG/ML
8 INJECTION INTRAMUSCULAR; INTRAVENOUS EVERY 6 HOURS PRN
Status: DISCONTINUED | OUTPATIENT
Start: 2025-06-27 | End: 2025-06-27 | Stop reason: HOSPADM

## 2025-06-27 RX ORDER — HEPARIN SODIUM (PORCINE) LOCK FLUSH IV SOLN 100 UNIT/ML 100 UNIT/ML
5 SOLUTION INTRAVENOUS ONCE
Status: COMPLETED | OUTPATIENT
Start: 2025-06-27 | End: 2025-06-27

## 2025-06-27 RX ORDER — KETOROLAC TROMETHAMINE 30 MG/ML
30 INJECTION, SOLUTION INTRAMUSCULAR; INTRAVENOUS ONCE
Status: COMPLETED | OUTPATIENT
Start: 2025-06-27 | End: 2025-06-27

## 2025-06-27 RX ADMIN — HYDROMORPHONE HYDROCHLORIDE 1 MG: 1 INJECTION, SOLUTION INTRAMUSCULAR; INTRAVENOUS; SUBCUTANEOUS at 16:35

## 2025-06-27 RX ADMIN — ONDANSETRON 8 MG: 2 INJECTION INTRAMUSCULAR; INTRAVENOUS at 15:37

## 2025-06-27 RX ADMIN — HYDROMORPHONE HYDROCHLORIDE 1 MG: 1 INJECTION, SOLUTION INTRAMUSCULAR; INTRAVENOUS; SUBCUTANEOUS at 17:47

## 2025-06-27 RX ADMIN — HEPARIN 5 ML: 100 SYRINGE at 18:09

## 2025-06-27 RX ADMIN — HYDROMORPHONE HYDROCHLORIDE 1 MG: 1 INJECTION, SOLUTION INTRAMUSCULAR; INTRAVENOUS; SUBCUTANEOUS at 15:34

## 2025-06-27 RX ADMIN — SODIUM CHLORIDE, SODIUM LACTATE, POTASSIUM CHLORIDE, AND CALCIUM CHLORIDE 1000 ML: .6; .31; .03; .02 INJECTION, SOLUTION INTRAVENOUS at 15:30

## 2025-06-27 RX ADMIN — KETOROLAC TROMETHAMINE 30 MG: 30 INJECTION, SOLUTION INTRAMUSCULAR at 15:32

## 2025-06-27 ASSESSMENT — ACTIVITIES OF DAILY LIVING (ADL)
ADLS_ACUITY_SCORE: 58

## 2025-06-27 NOTE — ED TRIAGE NOTES
Triage Assessment (Adult)       Row Name 06/27/25 1416          Triage Assessment    Airway WDL WDL        Respiratory WDL    Respiratory WDL WDL        Skin Circulation/Temperature WDL    Skin Circulation/Temperature WDL WDL        Cardiac WDL    Cardiac WDL WDL        Peripheral/Neurovascular WDL    Peripheral Neurovascular WDL WDL        Cognitive/Neuro/Behavioral WDL    Cognitive/Neuro/Behavioral WDL WDL

## 2025-06-27 NOTE — ED PROVIDER NOTES
"    [facility] EMERGENCY DEPARTMENT ([facility])    6/27/25      ED PROVIDER NOTE   Laura F  History     Chief Complaint   Patient presents with    Extremity Weakness     Right leg pain and weakness, pt had a stroke when she was 1 so this isn't new but more pain in right leg than usual.      The history is provided by the patient and medical records.     Jennifer Cervantes is a 25 year old female with history of sickle cell disease, CVA with residual spastic hemiplegia who presents to the emergency department with increased right lower extremity pain and weakness.  She has had lower extremity weakness in the past secondary to stroke but the pain in her right leg is worse than usual.  Denies any new weakness.  No falls or injuries.  She has pain mainly in the right calf and on the foot.  No injuries.  No fevers.  No chest pain or shortness of breath.  She says this pain is worse than usual.    Acute Pain Crisis Treatment: (limiting IV dosing)  ER   Dilaudid 1 mg IV q1h up to 3 doses  Toradol 30 mg IV x1   Maintenance IV fluids with LR  Other: Zofran 8 mg IV PRN nausea  Inpatient:  PCA Dilaudid 1 hr q30 minutes, no basal rate  Toradol 30 mg x6h x 4 hr  LR maintenance x 1-2 days  Other Medications: Zofran  ASA  Supportive Care: Docusate, Senna        Physical Exam   BP: 125/85  Pulse: 94  Temp: 98  F (36.7  C)  Resp: 16  Height: 162.6 cm (5' 4\")  Weight: 70.8 kg (156 lb)  SpO2: 95 %    Physical Exam  Physical Exam   Constitutional: oriented to person, place, and time. appears well-developed and well-nourished.   HENT:   Head: Normocephalic and atraumatic.   Neck: Normal range of motion.   Pulmonary/Chest: Effort normal. No respiratory distress.   Cardiac: No murmurs, rubs, gallops. RRR.  Abdominal: Abdomen soft, nontender, nondistended. No rebound tenderness.  MSK: Long bones without deformity or evidence of trauma.  No lower extremity edema.  Distal pulses in the feet 2+ and symmetric.  Right foot without " "appreciable tenderness, swelling, erythema or warmth.  Neurological: alert and oriented to person, place, and time.   Skin: Skin is warm and dry.   Psychiatric:  normal mood and affect.  behavior is normal. Thought content normal.      ED Course, Procedures, & Data      Procedures       Results for orders placed or performed during the hospital encounter of 06/27/25   US Lower Extremity Venous Duplex Right     Status: None    Narrative    ULTRASOUND LOWER EXTREMITY VENOUS DUPLEX RIGHT 6/27/2025 4:08 PM    CLINICAL HISTORY: new pain, hx dvt, not on thinners, sickle cell  patient;.     COMPARISONS: Ultrasound lower extremity venous duplex bilateral  5/9/2025    REFERRING PROVIDER: ALEJO ORTIZ JOSEPH    TECHNIQUE: Grayscale, color Doppler, Doppler waveform ultrasound  evaluation was performed through the right common femoral, femoral,  and popliteal veins. Right posterior tibial and peroneal veins were  evaluated with grayscale imaging and compression.    Left common femoral vein was evaluated for symmetry.    FINDINGS: Left common femoral vein is fully compressible with a phasic  Doppler waveform.    Right common femoral, femoral, and popliteal veins are fully  compressible with phasic Doppler waveforms.    Right posterior tibial veins are fully compressible.    Right peroneal veins are fully compressible.      Impression    IMPRESSION: No deep venous thrombosis demonstrated in the RIGHT leg.    Reference: \"Duplex Ultrasound in the Diagnosis of Lower-Extremity Deep  Venous Thrombosis\"- Jessi Trimble MD, S; Dangelo Vogel MD  (Circulation. 2014;129:917-921. http://circ.ahajournals.org)    DOUGIE SANDERS MD         SYSTEM ID:  [id number]   Foot  XR, G/E 3 views, right     Status: None    Narrative    EXAM: XR FOOT RIGHT G/E 3 VIEWS  LOCATION: [facility]  DATE: 6/27/2025    INDICATION: pain  COMPARISON: None.      Impression    IMPRESSION:     Nonweightbearing views " of the right foot are negative for acute fracture or dislocation. There appears to be at least partial bony fusion across the first tarsometatarsal joint. Hallux valgus.   Comprehensive metabolic panel     Status: Abnormal   Result Value Ref Range    Sodium 139 135 - 145 mmol/L    Potassium 3.7 3.4 - 5.3 mmol/L    Carbon Dioxide (CO2) 22 22 - 29 mmol/L    Anion Gap 12 7 - 15 mmol/L    Urea Nitrogen 5.8 (L) 6.0 - 20.0 mg/dL    Creatinine 0.50 (L) 0.51 - 0.95 mg/dL    GFR Estimate >90 >60 mL/min/1.73m2    Calcium 9.0 8.8 - 10.4 mg/dL    Chloride 105 98 - 107 mmol/L    Glucose 84 70 - 99 mg/dL    Alkaline Phosphatase 61 40 - 150 U/L    AST 54 (H) 0 - 45 U/L    ALT 33 0 - 50 U/L    Protein Total 8.0 6.4 - 8.3 g/dL    Albumin 4.7 3.5 - 5.2 g/dL    Bilirubin Total 2.4 (H) <=1.2 mg/dL   Reticulocyte count     Status: Abnormal   Result Value Ref Range    % Reticulocyte 22.9 (H) 0.5 - 2.0 %    Absolute Reticulocyte 0.578 (H) 0.025 - 0.095 10e6/uL   HCG qualitative pregnancy (blood)     Status: Normal   Result Value Ref Range    hCG Serum Qualitative Negative Negative   CBC with platelets and differential     Status: Abnormal   Result Value Ref Range    WBC Count 11.2 (H) 4.0 - 11.0 10e3/uL    RBC Count 2.53 (L) 3.80 - 5.20 10e6/uL    Hemoglobin 7.9 (L) 11.7 - 15.7 g/dL    Hematocrit 22.4 (L) 35.0 - 47.0 %    MCV 89 78 - 100 fL    MCH 31.2 26.5 - 33.0 pg    MCHC 35.3 31.5 - 36.5 g/dL    RDW 25.2 (H) 10.0 - 15.0 %    Platelet Count 526 (H) 150 - 450 10e3/uL    % Neutrophils 66 %    % Lymphocytes 22 %    % Monocytes 8 %    % Eosinophils 2 %    % Basophils 2 %    % Immature Granulocytes 0 %    NRBCs per 100 WBC 1 (H) <1 /100    Absolute Neutrophils 7.3 1.6 - 8.3 10e3/uL    Absolute Lymphocytes 2.5 0.8 - 5.3 10e3/uL    Absolute Monocytes 0.8 0.0 - 1.3 10e3/uL    Absolute Eosinophils 0.3 0.0 - 0.7 10e3/uL    Absolute Basophils 0.2 0.0 - 0.2 10e3/uL    Absolute Immature Granulocytes 0.1 <=0.4 10e3/uL    Absolute NRBCs 0.2 10e3/uL    CBC with platelets differential     Status: Abnormal    Narrative    The following orders were created for panel order CBC with platelets differential.  Procedure                               Abnormality         Status                     ---------                               -----------         ------                     CBC with platelets and ...[[id number]]  Abnormal            Final result                 Please view results for these tests on the individual orders.             Medications - No data to display       Critical care was not performed.     Medical Decision Making  The patient's presentation was of high complexity (a chronic illness severe exacerbation, progression, or side effect of treatment).    The patient's evaluation involved:  ordering and/or review of 3+ test(s) in this encounter (see separate area of note for details)    The patient's management necessitated high risk (a parenteral controlled substance).    Assessment & Plan    Jennifer Cervantes is a 25 year old female with history of sickle cell disease, CVA with residual spastic hemiplegia who presents to the emergency department with increased right lower extremity pain.  Ultrasound without DVT.  X-ray unremarkable.  No signs of infection clinically.  The patient has no new neurologic deficits, very unlikely stroke or spinal lesion.  Pain is well-controlled after pain plan.  Patient discharged, discussed return precautions.  She feels comfortable with this plan    I have reviewed the nursing notes. I have reviewed the findings, diagnosis, plan and need for follow up with the patient.    New Prescriptions    No medications on file       Final diagnoses:   Sickle cell pain crisis (H)     Bambi NEWMAN, am serving as a trained medical scribe to document services personally performed by Andrew Chou MD based on the provider's statements to me on June 27, 2025.  This document has been checked and approved by the attending  provider.    I, Andrew Chou MD, was physically present and have reviewed and verified the accuracy of this note documented by Bambi Tejeda, medical scribe.      Andrew Chou MD   [facility] EMERGENCY DEPARTMENT  6/27/2025        Andrew Chou MD  06/27/25 1677

## 2025-06-30 ENCOUNTER — NURSE TRIAGE (OUTPATIENT)
Dept: ONCOLOGY | Facility: CLINIC | Age: 25
End: 2025-06-30
Payer: MEDICAID

## 2025-06-30 DIAGNOSIS — D57.00 SICKLE CELL DISEASE WITH CRISIS (H): ICD-10-CM

## 2025-06-30 RX ORDER — OXYCODONE HYDROCHLORIDE 10 MG/1
10 TABLET ORAL EVERY 6 HOURS PRN
Qty: 12 TABLET | Refills: 0 | Status: SHIPPED | OUTPATIENT
Start: 2025-06-30

## 2025-06-30 NOTE — PROGRESS NOTES
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    Interval History:  Jennifer is seen today per her request for hematology follow-up and discussion on pain control.    She has been frustrated with her pain over the past few weeks. Her pain has been more severe and difficult to control. The onset has been more abrupt making this less responsive to oxycodone. She continues to use Tylenol and ibuprofen in addition to heat and hydration at home. She cannot recall any specific triggers that would have increased her pain over the past few weeks.    She has been feeling disappointed in herself for going to the ED more than she'd care to but still reserves this for episodes of severe pain or when she can't get in to the infusion center due to availability. She requests to brainstorm a different strategy to treat her acute pain in an effort to reduce her ED utilization which is a primary goal of hers this year.       Sickle Cell Disease Comprehensive Checklist  Bone Health/Avascular Necrosis Screening/Symptoms (each visit): no new concerns today  Leg Ulcer evaluation (every visit): nothing to note  Hypertension (every visit):stable none for several months  Last pulmonary evaluation (asthma, AMAN, pulm HTN): 9/28/22, due for follow-up  Stroke/silent cerebral infarct Hx (Y/N): Yes TIA ~2014, first event ~age 1 with full stroke and R sided weakness  Last PCP Visit: 9/30/24 with Dr. Case (needs new PCP  Vaccines:  PCV13: 5/13/19  Pneumovax (PPSV23): 3/04, 10/09, 7/12/19, 10/2024  Menactra: 4/2010, 9/2015 (MCV done  8/16/21)  MenB: 9/16/15, 5/13/19, 1/30/25  Influenza: 10/11/24  Audiology (chelation): done 2/27/24    Comparison to Previous Audiogram dated 6/6/2022:     Pure Tone Thresholds 250-8000 Hz:    RIGHT: stable    LEFT: stable     High Frequency Audiometry 9,000-16,000Hz:     RIGHT: stable    LEFT: stable     Word Recognition Score:    RIGHT: stable    LEFT: stable    Plan last reviewed with patient: 3/17/25    Patient background: 26 yo F, enjoys movies and kids though there are times where she does not really want to talk to people. Does not have a lot of social support at home.     Sickle Cell Disease History  Primary Hematologist Team: Jose Rafael Duncan  PCP: none  Genotype: SS  Acute Pain Crisis Treatment: (limiting IV)   ER   Dilaudid 1 mg IV q1h up to 3 doses  Toradol 30 mg IV x1   Maintenance IV fluids with LR  Other: Zofran 8 mg IV PRN nausea  Inpatient:  PCA Dilaudid 1 hr q30 minutes, no basal rate  Toradol 30 mg x6h x 4 hr  LR maintenance x 1-2 days  Other Medications: Zofran  ASA  Supportive Care: Docusate, Senna  Chronic Pain Medications:    Home regimen: oxycodone 15 mg p.o. q.4-6 hours p.r.n. breakthrough pain.  She also continues on Voltaren gel, and Zoloft among other medications.    -Also benefits from mental health visits, acupuncture  Baseline Hemoglobin: 7 g/dl without chronic transfusions  Hydroxyurea use: Yes  H/O blood transfusions: Yes, several (iron overload) Most recent 11/20/2021  H/O Transfusion Reactions: no  Antibodies:none  H/O Acute Chest Syndrome: Yes  Last episode:9/05/22 (previously 4/26/21, 10/2019)   ICU/intubation: not with 9/2022 admission  H/O Stroke: Yes (managed with chronic transfusions in the past, stopped late Spring 2020)  H/O VTE: Yes (2/2021)  H/O Cholecystectomy or Splenectomy: no  H/O Asthma, Pulm HTN, AVN, Leg Ulcers, Nephropathy, Retinopathy, etc: Iron overload, asthma, chronic lung disease, physical limitations from early  stroke    ---------------------------------------  Jennifer Cervantes's Goals     1-3 month goal:  Continue weekly therapy    6 month goal:      12 month goal:      Disease-specific goal(s):  Continue to decrease ED visits  Use oxycodone on more of an as-needed basis rather than scheduled      Current Outpatient Medications   Medication Sig Dispense Refill    albuterol (PROVENTIL) (2.5 MG/3ML) 0.083% neb solution Take 1 vial (2.5 mg) by nebulization every 6 hours as needed for shortness of breath, wheezing or cough. 90 mL 0    aspirin (ASA) 81 MG chewable tablet Take 1 tablet (81 mg) by mouth 2 times daily 60 tablet 11    azelastine (ASTELIN) 0.1 % nasal spray Twice daily, spray once into each nostril after blowing your nose. Stand still for 1-3 minutes after spraying into nostril, to avoid dripping. After that, your may blow your nose again, to clear the remaining medication. 30 mL 4    budesonide-formoterol (SYMBICORT/BREYNA) 160-4.5 MCG/ACT Inhaler Inhale 2 puffs twice daily plus 1-2 puffs as needed. May use up to 12 puffs per day. 20.4 g 11    deferasirox (JADENU) 360 MG tablet Take 4 tablets (1,440 mg) by mouth daily. 120 tablet 11    Deferiprone, Twice Daily, 1000 MG TABS Take 2,000 mg by mouth 2 times daily. 150 tablet 3    diclofenac (VOLTAREN) 1 % topical gel Apply 4 g topically 4 times daily as needed (moderate knee pain). 350 g 1    diphenhydrAMINE (BENADRYL) 50 MG capsule Administer 12  hours pre - IV contrast injection and 2 hours prior to contrast. Patient must have a . 2 capsule 0    EPINEPHrine (ANY BX GENERIC EQUIV) 0.3 MG/0.3ML injection 2-pack Inject 0.3 mLs (0.3 mg) into the muscle as needed for anaphylaxis. 1 each 1    Fluticasone-Umeclidin-Vilant (TRELEGY ELLIPTA) 100-62.5-25 MCG/ACT oral inhaler Inhale 1 puff into the lungs daily. 60 each 2    hydroxyurea (HYDREA) 500 MG capsule Take 6 capsules (3,000 mg) by mouth daily 180 capsule 11    ibuprofen (ADVIL/MOTRIN) 800 MG tablet Take 800 mg by  mouth every 8 hours as needed for moderate pain (Patient not taking: Reported on 6/23/2025)      ketotifen fumarate 0.035% 0.035 % SOLN ophthalmic solution Place 1 drop into both eyes 2 times daily as needed for itching. 10 mL 11    methocarbamol (ROBAXIN) 750 MG tablet Take 1 tablet (750 mg) by mouth 4 times daily as needed for muscle spasms (during sickle pain crises. Okay to take scheduled while in pain). 60 tablet 1    methylPREDNISolone (MEDROL) 32 MG tablet Take 1 tab 12 hours before appointment and 1 tab 2 hours prior to the procedure with IV contrast. 2 tablet 0    naloxone (NARCAN) 4 MG/0.1ML nasal spray Spray 4 mg into one nostril alternating nostrils as needed for opioid reversal. every 2-3 minutes until assistance arrives 0.2 mL 0    naproxen (NAPROSYN) 500 MG tablet Take 1 tablet (500 mg) by mouth daily as needed (pain). Take with food. Use sparingly and avoid taking on the same days you take ibuprofen. 30 tablet 1    oxyCODONE IR (ROXICODONE) 10 MG tablet Take 1 tablet (10 mg) by mouth every 6 hours as needed for moderate to severe pain. (Goal of less than 4 per day) 12 tablet 0    pantoprazole (PROTONIX) 40 MG EC tablet Take 1 tablet (40 mg) by mouth daily. 30 tablet 0    sertraline (ZOLOFT) 50 MG tablet Take 1 tablet (50 mg) by mouth daily. 90 tablet 3     Past Medical History  Past Medical History:   Diagnosis Date    Anxiety     Bleeding disorder     Blood clotting disorder     Cerebral infarction (H) 2015    Cognitive developmental delay     low IQ. 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 side (H)     right hand contractures    Iron overload due to repeated red blood cell transfusions     Migraines     Multiple subsegmental pulmonary emboli without acute cor pulmonale (H) 02/01/2021    Oppositional defiant behavior     Presence of intrauterine contraceptive device  2/18/2020    Superficial venous thrombosis of arm, right 03/25/2021    Uncomplicated asthma      Past Surgical History:   Procedure Laterality Date    AS INSERT TUNNELED CV 2 CATH W/O PORT/PUMP      CHOLECYSTECTOMY      CV RIGHT HEART CATH MEASUREMENTS RECORDED N/A 11/18/2021    Procedure: Right Heart Cath;  Surgeon: Jackson Stauffer MD;  Location:  [facility] CATH LAB    INSERT PORT VASCULAR ACCESS Left 4/21/2021    Procedure: INSERTION, VASCULAR ACCESS PORT (NOT SURE ON SIDE UNTIL REMOVAL);  Surgeon: Rajan More MD;  Location: [facility] OR    IR CHEST PORT PLACEMENT > 5 YRS OF AGE  4/21/2021    IR CVC NON TUNNEL LINE REMOVAL  5/6/2021    IR CVC NON TUNNEL PLACEMENT > 5 YRS  04/07/2020    IR CVC NON TUNNEL PLACEMENT > 5 YRS  4/30/2021    IR CVC NON TUNNEL PLACEMENT > 5 YRS  9/7/2022    IR PORT REMOVAL LEFT  4/21/2021    REMOVE PORT VASCULAR ACCESS Left 4/21/2021    Procedure: REMOVAL, VASCULAR ACCESS PORT LEFT;  Surgeon: Rajan More MD;  Location: [facility] OR    REPAIR TENDON ELBOW Right 10/02/2019    Procedure: Right Elbow Flexor Lengthening, Flexor Pronator Slide Of Wrist and Finger, Thumb Adductor Lengthening;  Surgeon: Anai Franco MD;  Location: [facility] OR    TONSILLECTOMY Bilateral 10/02/2019    Procedure: Bilateral Tonsillectomy;  Surgeon: Farhana Guy MD;  Location: [facility] OR    ZZC BREAST REDUCTION (INCLUDES LIPO) TIER 3 Bilateral 04/23/2019     Allergies   Allergen Reactions    Contrast Dye Angioedema     Hives and breathing issues    Fish-Derived Products Hives    Seafood Hives    Adhesive Tape Hives     Primipore dressing causes hives    Gadolinium     Iodinated Contrast Media      Social History   Social History     Tobacco Use    Smoking status: Never     Passive exposure: Never    Smokeless tobacco: Never   Substance Use Topics    Alcohol use: Not Currently     Alcohol/week: 0.0 standard drinks of alcohol    Drug use: Never   Past medical history and social history were  reviewed.    Physical Examination:  /77 (BP Location: Right arm, Patient Position: Sitting, Cuff Size: Adult Regular)   Pulse 76   Temp 98.3  F (36.8  C) (Oral)   Resp 18   Wt 69.9 kg (154 lb)   SpO2 99%   BMI 26.43 kg/m      Wt Readings from Last 10 Encounters:   06/27/25 70.8 kg (156 lb)   06/24/25 71.1 kg (156 lb 12.8 oz)   06/20/25 71.2 kg (157 lb)   06/12/25 68 kg (150 lb)   06/06/25 70.3 kg (155 lb)   05/30/25 70.3 kg (155 lb)   05/28/25 70.7 kg (155 lb 12.8 oz)   05/24/25 71.2 kg (157 lb)   05/23/25 71.1 kg (156 lb 12.8 oz)   05/15/25 72.2 kg (159 lb 3.2 oz)     General: Pleasant female, NAD  Eyes: EOMI, PERRL  ENT: oral mucosa moist, pink  Respiratory: normal respiratory effort on RA  Ext: no peripheral edema  Neurologic: chronic contracture of right arm and wrist. Steady gait. A/O x 4.  Skin: No rashes, petechiae, or bruising noted on exposed skin.   Laboratory Data:  Recent Results (from the past 240 hours)   Comprehensive metabolic panel    Collection Time: 06/20/25  4:55 PM   Result Value Ref Range    Sodium 138 135 - 145 mmol/L    Potassium 4.1 3.4 - 5.3 mmol/L    Carbon Dioxide (CO2) 20 (L) 22 - 29 mmol/L    Anion Gap 14 7 - 15 mmol/L    Urea Nitrogen 9.2 6.0 - 20.0 mg/dL    Creatinine 0.51 0.51 - 0.95 mg/dL    GFR Estimate >90 >60 mL/min/1.73m2    Calcium 8.6 (L) 8.8 - 10.4 mg/dL    Chloride 104 98 - 107 mmol/L    Glucose 84 70 - 99 mg/dL    Alkaline Phosphatase 59 40 - 150 U/L    AST 56 (H) 0 - 45 U/L    ALT 32 0 - 50 U/L    Protein Total 7.7 6.4 - 8.3 g/dL    Albumin 4.5 3.5 - 5.2 g/dL    Bilirubin Total 3.3 (H) <=1.2 mg/dL   Reticulocyte count    Collection Time: 06/20/25  4:55 PM   Result Value Ref Range    % Reticulocyte 25.3 (H) 0.5 - 2.0 %    Absolute Reticulocyte 0.548 (H) 0.025 - 0.095 10e6/uL   CBC with platelets and differential    Collection Time: 06/20/25  4:55 PM   Result Value Ref Range    WBC Count 13.8 (H) 4.0 - 11.0 10e3/uL    RBC Count 2.46 (L) 3.80 - 5.20 10e6/uL     Hemoglobin 7.7 (L) 11.7 - 15.7 g/dL    Hematocrit 21.4 (L) 35.0 - 47.0 %    MCV 87 78 - 100 fL    MCH 31.3 26.5 - 33.0 pg    MCHC 36.0 31.5 - 36.5 g/dL    RDW 26.5 (H) 10.0 - 15.0 %    Platelet Count 475 (H) 150 - 450 10e3/uL    % Neutrophils 75 %    % Lymphocytes 14 %    % Monocytes 8 %    % Eosinophils 2 %    % Basophils 1 %    % Immature Granulocytes 0 %    NRBCs per 100 WBC 5 (H) <1 /100    Absolute Neutrophils 10.3 (H) 1.6 - 8.3 10e3/uL    Absolute Lymphocytes 1.9 0.8 - 5.3 10e3/uL    Absolute Monocytes 1.1 0.0 - 1.3 10e3/uL    Absolute Eosinophils 0.3 0.0 - 0.7 10e3/uL    Absolute Basophils 0.2 0.0 - 0.2 10e3/uL    Absolute Immature Granulocytes 0.1 <=0.4 10e3/uL    Absolute NRBCs 0.7 10e3/uL   IgA    Collection Time: 06/20/25  4:55 PM   Result Value Ref Range    Immunoglobulin A 195 84 - 499 mg/dL   Mitochondrial M2 Antibody IgG    Collection Time: 06/20/25  4:55 PM   Result Value Ref Range    Mitochondrial M2 Antibody IgG <1.0 <4.0 U/mL   UA with Microscopic reflex to Culture    Collection Time: 06/24/25  5:15 PM    Specimen: Urine, Midstream   Result Value Ref Range    Color Urine Light Yellow Colorless, Straw, Light Yellow, Yellow    Appearance Urine Clear Clear    Glucose Urine Negative Negative mg/dL    Bilirubin Urine Negative Negative    Ketones Urine Negative Negative mg/dL    Specific Gravity Urine 1.010 1.003 - 1.035    Blood Urine Negative Negative    pH Urine 6.0 5.0 - 7.0    Protein Albumin Urine Negative Negative mg/dL    Urobilinogen Urine Normal Normal mg/dL    Nitrite Urine Negative Negative    Leukocyte Esterase Urine Negative Negative    Bacteria Urine Few (A) None Seen /HPF    Mucus Urine Present (A) None Seen /LPF    RBC Urine <1 <=2 /HPF    WBC Urine 1 <=5 /HPF    Squamous Epithelials Urine <1 <=1 /HPF   Comprehensive metabolic panel    Collection Time: 06/24/25  5:36 PM   Result Value Ref Range    Sodium 139 135 - 145 mmol/L    Potassium 3.8 3.4 - 5.3 mmol/L    Carbon Dioxide (CO2) 21  (L) 22 - 29 mmol/L    Anion Gap 14 7 - 15 mmol/L    Urea Nitrogen 9.9 6.0 - 20.0 mg/dL    Creatinine 0.52 0.51 - 0.95 mg/dL    GFR Estimate >90 >60 mL/min/1.73m2    Calcium 8.8 8.8 - 10.4 mg/dL    Chloride 104 98 - 107 mmol/L    Glucose 97 70 - 99 mg/dL    Alkaline Phosphatase 61 40 - 150 U/L    AST 65 (H) 0 - 45 U/L    ALT 43 0 - 50 U/L    Protein Total 8.0 6.4 - 8.3 g/dL    Albumin 4.7 3.5 - 5.2 g/dL    Bilirubin Total 2.5 (H) <=1.2 mg/dL   Lipase    Collection Time: 06/24/25  5:36 PM   Result Value Ref Range    Lipase 30 13 - 60 U/L   Reticulocyte count    Collection Time: 06/24/25  5:36 PM   Result Value Ref Range    % Reticulocyte 17.6 (H) 0.5 - 2.0 %    Absolute Reticulocyte 0.423 (H) 0.025 - 0.095 10e6/uL   HCG qualitative Blood    Collection Time: 06/24/25  5:36 PM   Result Value Ref Range    hCG Serum Qualitative Negative Negative   CBC with platelets and differential    Collection Time: 06/24/25  5:36 PM   Result Value Ref Range    WBC Count 11.1 (H) 4.0 - 11.0 10e3/uL    RBC Count 2.37 (L) 3.80 - 5.20 10e6/uL    Hemoglobin 7.3 (L) 11.7 - 15.7 g/dL    Hematocrit 20.0 (L) 35.0 - 47.0 %    MCV 84 78 - 100 fL    MCH 30.8 26.5 - 33.0 pg    MCHC 36.5 31.5 - 36.5 g/dL    RDW 24.1 (H) 10.0 - 15.0 %    Platelet Count 476 (H) 150 - 450 10e3/uL    % Neutrophils 64 %    % Lymphocytes 20 %    % Monocytes 11 %    % Eosinophils 3 %    % Basophils 2 %    % Immature Granulocytes 1 %    NRBCs per 100 WBC 2 (H) <1 /100    Absolute Neutrophils 7.1 1.6 - 8.3 10e3/uL    Absolute Lymphocytes 2.2 0.8 - 5.3 10e3/uL    Absolute Monocytes 1.2 0.0 - 1.3 10e3/uL    Absolute Eosinophils 0.4 0.0 - 0.7 10e3/uL    Absolute Basophils 0.2 0.0 - 0.2 10e3/uL    Absolute Immature Granulocytes 0.1 <=0.4 10e3/uL    Absolute NRBCs 0.3 10e3/uL   Reticulocyte count    Collection Time: 06/27/25  3:26 PM   Result Value Ref Range    % Reticulocyte 22.9 (H) 0.5 - 2.0 %    Absolute Reticulocyte 0.578 (H) 0.025 - 0.095 10e6/uL   CBC with platelets and  differential    Collection Time: 06/27/25  3:26 PM   Result Value Ref Range    WBC Count 11.2 (H) 4.0 - 11.0 10e3/uL    RBC Count 2.53 (L) 3.80 - 5.20 10e6/uL    Hemoglobin 7.9 (L) 11.7 - 15.7 g/dL    Hematocrit 22.4 (L) 35.0 - 47.0 %    MCV 89 78 - 100 fL    MCH 31.2 26.5 - 33.0 pg    MCHC 35.3 31.5 - 36.5 g/dL    RDW 25.2 (H) 10.0 - 15.0 %    Platelet Count 526 (H) 150 - 450 10e3/uL    % Neutrophils 66 %    % Lymphocytes 22 %    % Monocytes 8 %    % Eosinophils 2 %    % Basophils 2 %    % Immature Granulocytes 0 %    NRBCs per 100 WBC 1 (H) <1 /100    Absolute Neutrophils 7.3 1.6 - 8.3 10e3/uL    Absolute Lymphocytes 2.5 0.8 - 5.3 10e3/uL    Absolute Monocytes 0.8 0.0 - 1.3 10e3/uL    Absolute Eosinophils 0.3 0.0 - 0.7 10e3/uL    Absolute Basophils 0.2 0.0 - 0.2 10e3/uL    Absolute Immature Granulocytes 0.1 <=0.4 10e3/uL    Absolute NRBCs 0.2 10e3/uL   Comprehensive metabolic panel    Collection Time: 06/27/25  3:27 PM   Result Value Ref Range    Sodium 139 135 - 145 mmol/L    Potassium 3.7 3.4 - 5.3 mmol/L    Carbon Dioxide (CO2) 22 22 - 29 mmol/L    Anion Gap 12 7 - 15 mmol/L    Urea Nitrogen 5.8 (L) 6.0 - 20.0 mg/dL    Creatinine 0.50 (L) 0.51 - 0.95 mg/dL    GFR Estimate >90 >60 mL/min/1.73m2    Calcium 9.0 8.8 - 10.4 mg/dL    Chloride 105 98 - 107 mmol/L    Glucose 84 70 - 99 mg/dL    Alkaline Phosphatase 61 40 - 150 U/L    AST 54 (H) 0 - 45 U/L    ALT 33 0 - 50 U/L    Protein Total 8.0 6.4 - 8.3 g/dL    Albumin 4.7 3.5 - 5.2 g/dL    Bilirubin Total 2.4 (H) <=1.2 mg/dL   HCG qualitative pregnancy (blood)    Collection Time: 06/27/25  3:27 PM   Result Value Ref Range    hCG Serum Qualitative Negative Negative     I reviewed the above labs today.    Assessment and Plan:  1. Sickle Cell HgbSS Disease  2. Acute flare of chronic pain  3. Iron overload  4. 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 History of CVA  6. Hearing loss  7. Mental Health    Jennifer continues to work on  management of acute on chronic pain. She will receive IVF and pain medication in infusion today. Her outpatient infusion visits are currently capped at 2x/week although, as usual, her ability to get in for infusion varies based on availability the day of the request.    She feels disappointed in herself for her slow uptick in ED visits at the year has progressed. Currently we do not alter her ability to come in to the infusion center based on recent ED visits. She has been thinking of ways to decrease ED use and initially asked that we consider limiting both her outpatient infusion visits further based on whether she has also had ED visits within the week. On review of her infusion visits and ED visits from the past month, she averages 3 visits per week for additional IVF and IV pain medication either in the infusion center or ED. It would be reasonable to limit her total weekly infusion to 2 infusion or ED visits per week which would be a reduction in her current weekly average.     Alternatively, we discussed adjusting the way she take her oral oxycodone at home. Typically she is using the oxycodone daily taking 1 tablet in the AM and PM. Although her total daily MME has significantly decreased over time, this is likely still treating her chronic pain and she will likely find more benefit when truly using this as needed.     She prefers to work on using her oxycodone as needed at home rather than taking this on a scheduled basis. We will see if this results in better control of her pain. She is aware that she may experience a temporary increase in pain initially when altering her schedule. We will continue to cap her outpatient infusion visits at 2x/week regardless of ED visits.     HbSS with acute on chronic pain and iron overload  -Continue HU 3000 mg daily  -Monthly crizanlizumab    Pain management  -Mix of pain medications including oxycodone, Robaxin, ibuprofen or naproxen, Tylenol. No more than 2 infusions  per week. No change to oxycodone dose and quantity but work on using PRN rather than scheduled  -Continue diligent home management with current medications, heat, rest, compression, warm baths.   -Continue to reassess plan for home oxycodone use monthly.  Continue prescriptions for 12 tablets oxycodone per week (10 mg tablets).  We can continue to decrease weekly if she desires and work jointly to set goals.  We are aiming to avoid any dose or quantity escalations which she continues to be successful with.   --If unable to manage at home can go to ED  with continued plan to use IV Dilaudid and take a break from ketamine (10/2/23). No PCA use and goal for any admission would still be to discharge by 5 days or less    Iron Overload  A Ferriscan was performed 1/30/25 showing a decrease in her average iron concentration, now 24 mg/g dry tissue, previously 31.8. Repeat a Ferriscan in 6 months (June 2025)--rescheduled to7/14. She remains on Jadenu 1440 mg daily and deferiprone 2000 mg BID.    Stroke Hx  Holding off on chronic transfusions right now per mutually agreed upon management. Trying to reduce iron further before considering restart.  -Still on ASA    Mental Health  Routinely following with therapy and health psychology. Finds this very beneficial in managing interpersonal relationships with both family and her healthcare team.    Health maintenance  Up-to-date    Plan:   Follow-up with me 7/18 with next monthly sidney infusion      Patricia Mantilla CNP  ---  50 minutes spent on the date of the encounter doing chart review, review of test results, interpretation of tests, patient visit, and documentation.    The longitudinal plan of care for the diagnosis(es)/condition(s) as documented were addressed during this visit. Due to the added complexity in care, I will continue to support Jennifer in the subsequent management and with ongoing continuity of care.

## 2025-06-30 NOTE — TELEPHONE ENCOUNTER
Oncology Nurse Triage - Sickle Cell Pain Crisis:    Situation: Jennifer  calling about Sickle Cell Pain Crisis, requesting to be added on for IV fluids and pain medicine    Background:     Patient's last infusion was  6/27/25 in ED; 6/25/25 in infusion   Last clinic visit date: 5/23/25 w/ Patricia Mantilla   Does patient have active treatment plan? Yes    Assessment of Symptoms:  Onset/Duration of symptoms: 1 day    Is it typical sickle cell pain? Yes  Location: back  Character: Burning  Intensity: 10/10    Any radiation of pain, numbness, tingling, weakness, warmth, swelling, discoloration of arms or legs? No    Fever? No    Chest Pain Present: No    Shortness of breath: No    Other home therapies tried: HEAT/HEATING PAD and WARM BATH     Last home medication taken and when: 6/27/25 in the evening    Any Refills Needed?: Yes- Oxycodone     Does patient have transportation & length of time to get to clinic: No- needs transportation    Recommendations:   [phone] page to Dr. Duncan.   [id number] by Dr. Duncan to come in. Added to infusion wait list.

## 2025-06-30 NOTE — TELEPHONE ENCOUNTER
Narcotic Refill Request    Medication(s) requested:  Oxycodone 10mg tab  Person Requesting Refill: Jennifer  What pain is the medication treating: sickle cell pain  How is the medication being taken?: as needed  Does pt have enough for today?  No, she is out.   Is pain being adequately controlled on the current regimen?: yes  Experiencing any side effects from medication?:  no    Date of most recent appointment:  5/23/25 w/ Patricia Mantilla   Any No Show Visits: none  Next appointment:   7/1/25  Last fill date and by whom:  6/23/25 by Patricia Mantilla    Reviewed:  no access    Send to provider: Patricia Mantilla and JOE McgeeCC

## 2025-07-01 ENCOUNTER — ONCOLOGY VISIT (OUTPATIENT)
Dept: ONCOLOGY | Facility: CLINIC | Age: 25
End: 2025-07-01
Attending: REGISTERED NURSE
Payer: MEDICAID

## 2025-07-01 ENCOUNTER — NURSE TRIAGE (OUTPATIENT)
Dept: ONCOLOGY | Facility: CLINIC | Age: 25
End: 2025-07-01
Payer: MEDICAID

## 2025-07-01 VITALS
HEART RATE: 76 BPM | TEMPERATURE: 98.3 F | DIASTOLIC BLOOD PRESSURE: 77 MMHG | RESPIRATION RATE: 18 BRPM | SYSTOLIC BLOOD PRESSURE: 114 MMHG | OXYGEN SATURATION: 99 % | WEIGHT: 154 LBS | BODY MASS INDEX: 26.43 KG/M2

## 2025-07-01 DIAGNOSIS — G89.29 OTHER CHRONIC PAIN: ICD-10-CM

## 2025-07-01 DIAGNOSIS — D57.00 SICKLE CELL PAIN CRISIS (H): Primary | ICD-10-CM

## 2025-07-01 DIAGNOSIS — G81.10 SPASTIC HEMIPLEGIA, UNSPECIFIED ETIOLOGY, UNSPECIFIED LATERALITY (H): ICD-10-CM

## 2025-07-01 DIAGNOSIS — Z86.73 HISTORY OF STROKE: ICD-10-CM

## 2025-07-01 DIAGNOSIS — D57.1 HB-SS DISEASE WITHOUT CRISIS (H): Primary | ICD-10-CM

## 2025-07-01 DIAGNOSIS — E83.111 IRON OVERLOAD DUE TO REPEATED RED BLOOD CELL TRANSFUSIONS: ICD-10-CM

## 2025-07-01 PROCEDURE — 250N000011 HC RX IP 250 OP 636: Performed by: PEDIATRICS

## 2025-07-01 PROCEDURE — 99215 OFFICE O/P EST HI 40 MIN: CPT | Performed by: REGISTERED NURSE

## 2025-07-01 PROCEDURE — 258N000003 HC RX IP 258 OP 636: Performed by: PEDIATRICS

## 2025-07-01 PROCEDURE — G2211 COMPLEX E/M VISIT ADD ON: HCPCS | Performed by: REGISTERED NURSE

## 2025-07-01 PROCEDURE — 250N000013 HC RX MED GY IP 250 OP 250 PS 637: Performed by: PEDIATRICS

## 2025-07-01 PROCEDURE — 96361 HYDRATE IV INFUSION ADD-ON: CPT

## 2025-07-01 PROCEDURE — 96375 TX/PRO/DX INJ NEW DRUG ADDON: CPT

## 2025-07-01 PROCEDURE — G0463 HOSPITAL OUTPT CLINIC VISIT: HCPCS | Performed by: REGISTERED NURSE

## 2025-07-01 PROCEDURE — 96360 HYDRATION IV INFUSION INIT: CPT

## 2025-07-01 RX ORDER — DIPHENHYDRAMINE HCL 25 MG
50 CAPSULE ORAL
Status: CANCELLED
Start: 2025-08-01

## 2025-07-01 RX ORDER — HEPARIN SODIUM,PORCINE 10 UNIT/ML
5 VIAL (ML) INTRAVENOUS
Status: CANCELLED | OUTPATIENT
Start: 2025-08-01

## 2025-07-01 RX ORDER — ONDANSETRON 2 MG/ML
8 INJECTION INTRAMUSCULAR; INTRAVENOUS EVERY 6 HOURS PRN
Status: DISCONTINUED | OUTPATIENT
Start: 2025-07-01 | End: 2025-07-01 | Stop reason: HOSPADM

## 2025-07-01 RX ORDER — HEPARIN SODIUM (PORCINE) LOCK FLUSH IV SOLN 100 UNIT/ML 100 UNIT/ML
5 SOLUTION INTRAVENOUS
Status: DISCONTINUED | OUTPATIENT
Start: 2025-07-01 | End: 2025-07-01 | Stop reason: HOSPADM

## 2025-07-01 RX ORDER — KETOROLAC TROMETHAMINE 30 MG/ML
30 INJECTION, SOLUTION INTRAMUSCULAR; INTRAVENOUS ONCE
Status: CANCELLED
Start: 2025-08-01 | End: 2025-08-01

## 2025-07-01 RX ORDER — KETOROLAC TROMETHAMINE 30 MG/ML
30 INJECTION, SOLUTION INTRAMUSCULAR; INTRAVENOUS ONCE
Status: COMPLETED | OUTPATIENT
Start: 2025-07-01 | End: 2025-07-01

## 2025-07-01 RX ORDER — ONDANSETRON 4 MG/1
8 TABLET, FILM COATED ORAL
Status: CANCELLED
Start: 2025-08-01

## 2025-07-01 RX ORDER — ONDANSETRON 2 MG/ML
8 INJECTION INTRAMUSCULAR; INTRAVENOUS EVERY 6 HOURS PRN
Status: CANCELLED
Start: 2025-08-01

## 2025-07-01 RX ORDER — DIPHENHYDRAMINE HCL 25 MG
50 CAPSULE ORAL
Status: COMPLETED | OUTPATIENT
Start: 2025-07-01 | End: 2025-07-01

## 2025-07-01 RX ORDER — HEPARIN SODIUM (PORCINE) LOCK FLUSH IV SOLN 100 UNIT/ML 100 UNIT/ML
5 SOLUTION INTRAVENOUS
Status: CANCELLED | OUTPATIENT
Start: 2025-08-01

## 2025-07-01 RX ADMIN — HYDROMORPHONE HYDROCHLORIDE 1 MG: 1 INJECTION, SOLUTION INTRAMUSCULAR; INTRAVENOUS; SUBCUTANEOUS at 09:45

## 2025-07-01 RX ADMIN — SODIUM CHLORIDE, SODIUM LACTATE, POTASSIUM CHLORIDE, AND CALCIUM CHLORIDE 1000 ML: .6; .31; .03; .02 INJECTION, SOLUTION INTRAVENOUS at 09:57

## 2025-07-01 RX ADMIN — ONDANSETRON 8 MG: 2 INJECTION INTRAMUSCULAR; INTRAVENOUS at 09:45

## 2025-07-01 RX ADMIN — HYDROMORPHONE HYDROCHLORIDE 1 MG: 1 INJECTION, SOLUTION INTRAMUSCULAR; INTRAVENOUS; SUBCUTANEOUS at 10:49

## 2025-07-01 RX ADMIN — KETOROLAC TROMETHAMINE 30 MG: 30 INJECTION, SOLUTION INTRAMUSCULAR; INTRAVENOUS at 09:45

## 2025-07-01 RX ADMIN — DIPHENHYDRAMINE HYDROCHLORIDE 50 MG: 25 CAPSULE ORAL at 09:45

## 2025-07-01 RX ADMIN — Medication 5 ML: at 12:03

## 2025-07-01 RX ADMIN — HYDROMORPHONE HYDROCHLORIDE 1 MG: 1 INJECTION, SOLUTION INTRAMUSCULAR; INTRAVENOUS; SUBCUTANEOUS at 11:56

## 2025-07-01 ASSESSMENT — PAIN SCALES - GENERAL: PAINLEVEL_OUTOF10: SEVERE PAIN (9)

## 2025-07-01 NOTE — TELEPHONE ENCOUNTER
"Oncology Nurse Triage - Sickle Cell Pain Crisis:     Situation: Jennifer  calling about Sickle Cell Pain Crisis, requesting to be added on for IV fluids and pain medicine     Background:   Patient's last infusion was  6/27/25 in ED; 6/25/25 in infusion   Last clinic visit date: 5/23/25 w/ Patricia Mantilla   Does patient have active treatment plan? Yes     Assessment of Symptoms:  Onset/Duration of symptoms: 2 days     Is it typical sickle cell pain? Yes  Location: back, \"whole body\"  Character: Burning, sharp  Intensity: 9/10     Any radiation of pain, numbness, tingling, weakness, warmth, swelling, discoloration of arms or legs? No     Fever? No     Chest Pain Present: No     Shortness of breath: No     Other home therapies tried: HEAT/HEATING PAD and WARM BATH      Last home medication taken and when: 6/27/25 in the evening     Any Refills Needed?: No     Does patient have transportation & length of time to get to clinic: Yes- has visit with Patricia Mantilla at 8am. Would prefer [facility].          Recommendations:  Added to infusion wait list. Patricia Mantilla updated via secure chat to IVF/pain med request and asked if she'd call infusion during their appt to see if openings come up.     Confirmed with [facility] charge nurse, that they took pt for IVF/pain meds after her clinic appt.  "

## 2025-07-01 NOTE — NURSING NOTE
"Oncology Rooming Note    July 1, 2025 8:04 AM   Jennifer Cervantes is a 25 year old female who presents for:    Chief Complaint   Patient presents with    Oncology Clinic Visit     Sickle cell Anemia     Initial Vitals: /77 (BP Location: Right arm, Patient Position: Sitting, Cuff Size: Adult Regular)   Pulse 76   Temp 98.3  F (36.8  C) (Oral)   Resp 18   Wt 69.9 kg (154 lb)   SpO2 99%   BMI 26.43 kg/m   Estimated body mass index is 26.43 kg/m  as calculated from the following:    Height as of 6/27/25: 1.626 m (5' 4\").    Weight as of this encounter: 69.9 kg (154 lb). Body surface area is 1.78 meters squared.  Severe Pain (9) Comment: Data Unavailable   No LMP recorded. Patient has had an implant.  Allergies reviewed: Yes  Medications reviewed: Yes    Medications: Medication refills not needed today.  Pharmacy name entered into EPIC:    [facility] [place], [place] - [address] [place] [address] [phone]  [facility] - [place], [place] - [address] [place] [address]    Frailty Screening:   Is the patient here for a new oncology consult visit in cancer care? 2. No    PHQ9:  Did this patient require a PHQ9?: No      Clinical concerns: none.      Korey Fields"

## 2025-07-01 NOTE — PROGRESS NOTES
Infusion Nursing Note:  Jennifer Cervantes presents today for IVF + Pain Medications.    Patient seen by provider today: Yes: Patricia Mantilla CNP   present during visit today: Not Applicable.    Note: Jennifer came in today complaining of severe pain in her Left leg and back, rating it a 9/10. She denies any chest pain, dizziness, swelling, or bleeding. She does report a dry cough that has been persistent for about 2 weeks without any production or worsening symptoms. At time of discharge her pain was 4/10 and she feels okay with this to discharge. She calls a medical cab to pick her up at the front entrance.       Intravenous Access:  Implanted Port.    Treatment Conditions:  Not Applicable.      Post Infusion Assessment:  Patient tolerated infusion without incident.  Blood return noted pre and post infusion.  Site patent and intact, free from redness, edema or discomfort.  No evidence of extravasations.  Access discontinued per protocol.       Discharge Plan:   Patient declined prescription refills.  Discharge instructions reviewed with: Patient.  Patient and/or family verbalized understanding of discharge instructions and all questions answered.  AVS to patient via [employer].  Patient will return 7/18 for next appointment.   Patient discharged in stable condition accompanied by: self.  Departure Mode: Ambulatory.      Alin Mike RN

## 2025-07-01 NOTE — TELEPHONE ENCOUNTER
DIAGNOSIS: Wrist fracture, left, closed, initial encounter [S62.102A]     APPOINTMENT DATE: 7/3/25   NOTES STATUS DETAILS   DISCHARGE REPORT from the ER Internal 6/20/25  [facility]   MEDICATION LIST Internal    XRAYS (IMAGES & REPORTS) Internal 6/20/25  XR Hand Left

## 2025-07-01 NOTE — TELEPHONE ENCOUNTER
Call from pt, worried she would be late for her visit with Patricia. Her scheduled cab ride messed up the  time and she is having to take an Uber in. She said she will arrive around 0750. Writer reassured pt her appt is at 8am with a 0745 check in, so she should be on time.

## 2025-07-01 NOTE — Clinical Note
7/1/2025      Jennifer Cervantes  [address]  [facility] [address]      Dear Colleague,    Thank you for referring your patient, Jennifer Cervantes, to the [facility]. Please see a copy of my visit note below.    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    Interval History:  Jennifer is seen today per her request for hematology follow-up and discussion on pain control.    She has been frustrated with her pain over the past few weeks. Her pain has been more severe and difficult to control. The onset has been more abrupt in the past making this less responsive to oxycodone. She has been feeling disappointed in herself for going to the ED more than she'd care to but still reserves this for episodes of severe pain or when she can't get in to the infusion center due to availability.       Sickle Cell Disease Comprehensive Checklist  Bone Health/Avascular Necrosis Screening/Symptoms (each visit): no new concerns today  Leg Ulcer evaluation (every visit): nothing to note  Hypertension (every visit):stable none for several months  Last pulmonary evaluation (asthma, AMAN, pulm HTN): 9/28/22, due for follow-up  Stroke/silent cerebral infarct Hx (Y/N): Yes TIA ~2014, first event ~age 1 with full stroke and R sided weakness  Last PCP Visit: 9/30/24 with Dr. Case (needs new PCP  Vaccines:  PCV13: 5/13/19  Pneumovax (PPSV23): 3/04, 10/09, 7/12/19, 10/2024  Menactra: 4/2010, 9/2015 (MCV done 8/16/21)  MenB: 9/16/15, 5/13/19, 1/30/25  Influenza: 10/11/24  Audiology (chelation): done  2/27/24    Comparison to Previous Audiogram dated 6/6/2022:     Pure Tone Thresholds 250-8000 Hz:    RIGHT: stable    LEFT: stable     High Frequency Audiometry 9,000-16,000Hz:     RIGHT: stable    LEFT: stable     Word Recognition Score:    RIGHT: stable    LEFT: stable    Plan last reviewed with patient: 3/17/25    Patient background: 26 yo F, enjoys movies and kids though there are times where she does not really want to talk to people. Does not have a lot of social support at home.     Sickle Cell Disease History  Primary Hematologist Team: Jose Rafael Duncan  PCP: none  Genotype: SS  Acute Pain Crisis Treatment: (limiting IV)   ER   Dilaudid 1 mg IV q1h up to 3 doses  Toradol 30 mg IV x1   Maintenance IV fluids with LR  Other: Zofran 8 mg IV PRN nausea  Inpatient:  PCA Dilaudid 1 hr q30 minutes, no basal rate  Toradol 30 mg x6h x 4 hr  LR maintenance x 1-2 days  Other Medications: Zofran  ASA  Supportive Care: Docusate, Senna  Chronic Pain Medications:    Home regimen: oxycodone 15 mg p.o. q.4-6 hours p.r.n. breakthrough pain.  She also continues on Voltaren gel, and Zoloft among other medications.    -Also benefits from mental health visits, acupuncture  Baseline Hemoglobin: 7 g/dl without chronic transfusions  Hydroxyurea use: Yes  H/O blood transfusions: Yes, several (iron overload) Most recent 11/20/2021  H/O Transfusion Reactions: no  Antibodies:none  H/O Acute Chest Syndrome: Yes  Last episode:9/05/22 (previously 4/26/21, 10/2019)   ICU/intubation: not with 9/2022 admission  H/O Stroke: Yes (managed with chronic transfusions in the past, stopped late Spring 2020)  H/O VTE: Yes (2/2021)  H/O Cholecystectomy or Splenectomy: no  H/O Asthma, Pulm HTN, AVN, Leg Ulcers, Nephropathy, Retinopathy, etc: Iron overload, asthma, chronic lung disease, physical limitations from early stroke    ---------------------------------------  Jennifer Hardins Goals     1-3 month goal:  Continue weekly therapy    6 month  goal:      12 month goal:      Disease-specific goal(s):  Continue to decrease ED visits  Decrease oxycodone use      Current Outpatient Medications   Medication Sig Dispense Refill    albuterol (PROVENTIL) (2.5 MG/3ML) 0.083% neb solution Take 1 vial (2.5 mg) by nebulization every 6 hours as needed for shortness of breath, wheezing or cough. 90 mL 0    aspirin (ASA) 81 MG chewable tablet Take 1 tablet (81 mg) by mouth 2 times daily 60 tablet 11    azelastine (ASTELIN) 0.1 % nasal spray Twice daily, spray once into each nostril after blowing your nose. Stand still for 1-3 minutes after spraying into nostril, to avoid dripping. After that, your may blow your nose again, to clear the remaining medication. 30 mL 4    budesonide-formoterol (SYMBICORT/BREYNA) 160-4.5 MCG/ACT Inhaler Inhale 2 puffs twice daily plus 1-2 puffs as needed. May use up to 12 puffs per day. 20.4 g 11    deferasirox (JADENU) 360 MG tablet Take 4 tablets (1,440 mg) by mouth daily. 120 tablet 11    Deferiprone, Twice Daily, 1000 MG TABS Take 2,000 mg by mouth 2 times daily. 150 tablet 3    diclofenac (VOLTAREN) 1 % topical gel Apply 4 g topically 4 times daily as needed (moderate knee pain). 350 g 1    diphenhydrAMINE (BENADRYL) 50 MG capsule Administer 12  hours pre - IV contrast injection and 2 hours prior to contrast. Patient must have a . 2 capsule 0    EPINEPHrine (ANY BX GENERIC EQUIV) 0.3 MG/0.3ML injection 2-pack Inject 0.3 mLs (0.3 mg) into the muscle as needed for anaphylaxis. 1 each 1    Fluticasone-Umeclidin-Vilant (TRELEGY ELLIPTA) 100-62.5-25 MCG/ACT oral inhaler Inhale 1 puff into the lungs daily. 60 each 2    hydroxyurea (HYDREA) 500 MG capsule Take 6 capsules (3,000 mg) by mouth daily 180 capsule 11    ibuprofen (ADVIL/MOTRIN) 800 MG tablet Take 800 mg by mouth every 8 hours as needed for moderate pain (Patient not taking: Reported on 6/23/2025)      ketotifen fumarate 0.035% 0.035 % SOLN ophthalmic solution Place 1 drop into  both eyes 2 times daily as needed for itching. 10 mL 11    methocarbamol (ROBAXIN) 750 MG tablet Take 1 tablet (750 mg) by mouth 4 times daily as needed for muscle spasms (during sickle pain crises. Okay to take scheduled while in pain). 60 tablet 1    methylPREDNISolone (MEDROL) 32 MG tablet Take 1 tab 12 hours before appointment and 1 tab 2 hours prior to the procedure with IV contrast. 2 tablet 0    naloxone (NARCAN) 4 MG/0.1ML nasal spray Spray 4 mg into one nostril alternating nostrils as needed for opioid reversal. every 2-3 minutes until assistance arrives 0.2 mL 0    naproxen (NAPROSYN) 500 MG tablet Take 1 tablet (500 mg) by mouth daily as needed (pain). Take with food. Use sparingly and avoid taking on the same days you take ibuprofen. 30 tablet 1    oxyCODONE IR (ROXICODONE) 10 MG tablet Take 1 tablet (10 mg) by mouth every 6 hours as needed for moderate to severe pain. (Goal of less than 4 per day) 12 tablet 0    pantoprazole (PROTONIX) 40 MG EC tablet Take 1 tablet (40 mg) by mouth daily. 30 tablet 0    sertraline (ZOLOFT) 50 MG tablet Take 1 tablet (50 mg) by mouth daily. 90 tablet 3     Past Medical History  Past Medical History:   Diagnosis Date    Anxiety     Bleeding disorder     Blood clotting disorder     Cerebral infarction (H) 2015    Cognitive developmental delay     low IQ. 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 side (H)     right hand contractures    Iron overload due to repeated red blood cell transfusions     Migraines     Multiple subsegmental pulmonary emboli without acute cor pulmonale (H) 02/01/2021    Oppositional defiant behavior     Presence of intrauterine contraceptive device 2/18/2020    Superficial venous thrombosis of arm, right 03/25/2021    Uncomplicated asthma      Past Surgical History:   Procedure Laterality Date    AS INSERT TUNNELED CV 2 CATH  W/O PORT/PUMP      CHOLECYSTECTOMY      CV RIGHT HEART CATH MEASUREMENTS RECORDED N/A 11/18/2021    Procedure: Right Heart Cath;  Surgeon: Jackson Stauffer MD;  Location:  HEART CARDIAC CATH LAB    INSERT PORT VASCULAR ACCESS Left 4/21/2021    Procedure: INSERTION, VASCULAR ACCESS PORT (NOT SURE ON SIDE UNTIL REMOVAL);  Surgeon: Rajan More MD;  Location: [facility] OR    IR CHEST PORT PLACEMENT > 5 YRS OF AGE  4/21/2021    IR CVC NON TUNNEL LINE REMOVAL  5/6/2021    IR CVC NON TUNNEL PLACEMENT > 5 YRS  04/07/2020    IR CVC NON TUNNEL PLACEMENT > 5 YRS  4/30/2021    IR CVC NON TUNNEL PLACEMENT > 5 YRS  9/7/2022    IR PORT REMOVAL LEFT  4/21/2021    REMOVE PORT VASCULAR ACCESS Left 4/21/2021    Procedure: REMOVAL, VASCULAR ACCESS PORT LEFT;  Surgeon: Rajan More MD;  Location: [facility] OR    REPAIR TENDON ELBOW Right 10/02/2019    Procedure: Right Elbow Flexor Lengthening, Flexor Pronator Slide Of Wrist and Finger, Thumb Adductor Lengthening;  Surgeon: Anai Franco MD;  Location: [facility] OR    TONSILLECTOMY Bilateral 10/02/2019    Procedure: Bilateral Tonsillectomy;  Surgeon: Farhana Guy MD;  Location: [facility] OR    ZZC BREAST REDUCTION (INCLUDES LIPO) TIER 3 Bilateral 04/23/2019     Allergies   Allergen Reactions    Contrast Dye Angioedema     Hives and breathing issues    Fish-Derived Products Hives    Seafood Hives    Adhesive Tape Hives     Primipore dressing causes hives    Gadolinium     Iodinated Contrast Media      Social History   Social History     Tobacco Use    Smoking status: Never     Passive exposure: Never    Smokeless tobacco: Never   Substance Use Topics    Alcohol use: Not Currently     Alcohol/week: 0.0 standard drinks of alcohol    Drug use: Never   Past medical history and social history were reviewed.    Physical Examination:  /77 (BP Location: Right arm, Patient Position: Sitting, Cuff Size: Adult Regular)   Pulse 76   Temp 98.3  F (36.8  C) (Oral)   Resp 18   Wt 69.9  kg (154 lb)   SpO2 99%   BMI 26.43 kg/m      Wt Readings from Last 10 Encounters:   06/27/25 70.8 kg (156 lb)   06/24/25 71.1 kg (156 lb 12.8 oz)   06/20/25 71.2 kg (157 lb)   06/12/25 68 kg (150 lb)   06/06/25 70.3 kg (155 lb)   05/30/25 70.3 kg (155 lb)   05/28/25 70.7 kg (155 lb 12.8 oz)   05/24/25 71.2 kg (157 lb)   05/23/25 71.1 kg (156 lb 12.8 oz)   05/15/25 72.2 kg (159 lb 3.2 oz)     General: Pleasant female, NAD  Eyes: EOMI, PERRL  ENT: oral mucosa moist, pink  Respiratory: normal respiratory effort on RA  Ext: no peripheral edema  Neurologic: chronic contracture of right arm and wrist. Steady gait. A/O x 4.  Skin: No rashes, petechiae, or bruising noted on exposed skin.   Laboratory Data:  Recent Results (from the past 240 hours)   Comprehensive metabolic panel    Collection Time: 06/20/25  4:55 PM   Result Value Ref Range    Sodium 138 135 - 145 mmol/L    Potassium 4.1 3.4 - 5.3 mmol/L    Carbon Dioxide (CO2) 20 (L) 22 - 29 mmol/L    Anion Gap 14 7 - 15 mmol/L    Urea Nitrogen 9.2 6.0 - 20.0 mg/dL    Creatinine 0.51 0.51 - 0.95 mg/dL    GFR Estimate >90 >60 mL/min/1.73m2    Calcium 8.6 (L) 8.8 - 10.4 mg/dL    Chloride 104 98 - 107 mmol/L    Glucose 84 70 - 99 mg/dL    Alkaline Phosphatase 59 40 - 150 U/L    AST 56 (H) 0 - 45 U/L    ALT 32 0 - 50 U/L    Protein Total 7.7 6.4 - 8.3 g/dL    Albumin 4.5 3.5 - 5.2 g/dL    Bilirubin Total 3.3 (H) <=1.2 mg/dL   Reticulocyte count    Collection Time: 06/20/25  4:55 PM   Result Value Ref Range    % Reticulocyte 25.3 (H) 0.5 - 2.0 %    Absolute Reticulocyte 0.548 (H) 0.025 - 0.095 10e6/uL   CBC with platelets and differential    Collection Time: 06/20/25  4:55 PM   Result Value Ref Range    WBC Count 13.8 (H) 4.0 - 11.0 10e3/uL    RBC Count 2.46 (L) 3.80 - 5.20 10e6/uL    Hemoglobin 7.7 (L) 11.7 - 15.7 g/dL    Hematocrit 21.4 (L) 35.0 - 47.0 %    MCV 87 78 - 100 fL    MCH 31.3 26.5 - 33.0 pg    MCHC 36.0 31.5 - 36.5 g/dL    RDW 26.5 (H) 10.0 - 15.0 %    Platelet  Count 475 (H) 150 - 450 10e3/uL    % Neutrophils 75 %    % Lymphocytes 14 %    % Monocytes 8 %    % Eosinophils 2 %    % Basophils 1 %    % Immature Granulocytes 0 %    NRBCs per 100 WBC 5 (H) <1 /100    Absolute Neutrophils 10.3 (H) 1.6 - 8.3 10e3/uL    Absolute Lymphocytes 1.9 0.8 - 5.3 10e3/uL    Absolute Monocytes 1.1 0.0 - 1.3 10e3/uL    Absolute Eosinophils 0.3 0.0 - 0.7 10e3/uL    Absolute Basophils 0.2 0.0 - 0.2 10e3/uL    Absolute Immature Granulocytes 0.1 <=0.4 10e3/uL    Absolute NRBCs 0.7 10e3/uL   IgA    Collection Time: 06/20/25  4:55 PM   Result Value Ref Range    Immunoglobulin A 195 84 - 499 mg/dL   Mitochondrial M2 Antibody IgG    Collection Time: 06/20/25  4:55 PM   Result Value Ref Range    Mitochondrial M2 Antibody IgG <1.0 <4.0 U/mL   UA with Microscopic reflex to Culture    Collection Time: 06/24/25  5:15 PM    Specimen: Urine, Midstream   Result Value Ref Range    Color Urine Light Yellow Colorless, Straw, Light Yellow, Yellow    Appearance Urine Clear Clear    Glucose Urine Negative Negative mg/dL    Bilirubin Urine Negative Negative    Ketones Urine Negative Negative mg/dL    Specific Gravity Urine 1.010 1.003 - 1.035    Blood Urine Negative Negative    pH Urine 6.0 5.0 - 7.0    Protein Albumin Urine Negative Negative mg/dL    Urobilinogen Urine Normal Normal mg/dL    Nitrite Urine Negative Negative    Leukocyte Esterase Urine Negative Negative    Bacteria Urine Few (A) None Seen /HPF    Mucus Urine Present (A) None Seen /LPF    RBC Urine <1 <=2 /HPF    WBC Urine 1 <=5 /HPF    Squamous Epithelials Urine <1 <=1 /HPF   Comprehensive metabolic panel    Collection Time: 06/24/25  5:36 PM   Result Value Ref Range    Sodium 139 135 - 145 mmol/L    Potassium 3.8 3.4 - 5.3 mmol/L    Carbon Dioxide (CO2) 21 (L) 22 - 29 mmol/L    Anion Gap 14 7 - 15 mmol/L    Urea Nitrogen 9.9 6.0 - 20.0 mg/dL    Creatinine 0.52 0.51 - 0.95 mg/dL    GFR Estimate >90 >60 mL/min/1.73m2    Calcium 8.8 8.8 - 10.4 mg/dL     Chloride 104 98 - 107 mmol/L    Glucose 97 70 - 99 mg/dL    Alkaline Phosphatase 61 40 - 150 U/L    AST 65 (H) 0 - 45 U/L    ALT 43 0 - 50 U/L    Protein Total 8.0 6.4 - 8.3 g/dL    Albumin 4.7 3.5 - 5.2 g/dL    Bilirubin Total 2.5 (H) <=1.2 mg/dL   Lipase    Collection Time: 06/24/25  5:36 PM   Result Value Ref Range    Lipase 30 13 - 60 U/L   Reticulocyte count    Collection Time: 06/24/25  5:36 PM   Result Value Ref Range    % Reticulocyte 17.6 (H) 0.5 - 2.0 %    Absolute Reticulocyte 0.423 (H) 0.025 - 0.095 10e6/uL   HCG qualitative Blood    Collection Time: 06/24/25  5:36 PM   Result Value Ref Range    hCG Serum Qualitative Negative Negative   CBC with platelets and differential    Collection Time: 06/24/25  5:36 PM   Result Value Ref Range    WBC Count 11.1 (H) 4.0 - 11.0 10e3/uL    RBC Count 2.37 (L) 3.80 - 5.20 10e6/uL    Hemoglobin 7.3 (L) 11.7 - 15.7 g/dL    Hematocrit 20.0 (L) 35.0 - 47.0 %    MCV 84 78 - 100 fL    MCH 30.8 26.5 - 33.0 pg    MCHC 36.5 31.5 - 36.5 g/dL    RDW 24.1 (H) 10.0 - 15.0 %    Platelet Count 476 (H) 150 - 450 10e3/uL    % Neutrophils 64 %    % Lymphocytes 20 %    % Monocytes 11 %    % Eosinophils 3 %    % Basophils 2 %    % Immature Granulocytes 1 %    NRBCs per 100 WBC 2 (H) <1 /100    Absolute Neutrophils 7.1 1.6 - 8.3 10e3/uL    Absolute Lymphocytes 2.2 0.8 - 5.3 10e3/uL    Absolute Monocytes 1.2 0.0 - 1.3 10e3/uL    Absolute Eosinophils 0.4 0.0 - 0.7 10e3/uL    Absolute Basophils 0.2 0.0 - 0.2 10e3/uL    Absolute Immature Granulocytes 0.1 <=0.4 10e3/uL    Absolute NRBCs 0.3 10e3/uL   Reticulocyte count    Collection Time: 06/27/25  3:26 PM   Result Value Ref Range    % Reticulocyte 22.9 (H) 0.5 - 2.0 %    Absolute Reticulocyte 0.578 (H) 0.025 - 0.095 10e6/uL   CBC with platelets and differential    Collection Time: 06/27/25  3:26 PM   Result Value Ref Range    WBC Count 11.2 (H) 4.0 - 11.0 10e3/uL    RBC Count 2.53 (L) 3.80 - 5.20 10e6/uL    Hemoglobin 7.9 (L) 11.7 - 15.7  g/dL    Hematocrit 22.4 (L) 35.0 - 47.0 %    MCV 89 78 - 100 fL    MCH 31.2 26.5 - 33.0 pg    MCHC 35.3 31.5 - 36.5 g/dL    RDW 25.2 (H) 10.0 - 15.0 %    Platelet Count 526 (H) 150 - 450 10e3/uL    % Neutrophils 66 %    % Lymphocytes 22 %    % Monocytes 8 %    % Eosinophils 2 %    % Basophils 2 %    % Immature Granulocytes 0 %    NRBCs per 100 WBC 1 (H) <1 /100    Absolute Neutrophils 7.3 1.6 - 8.3 10e3/uL    Absolute Lymphocytes 2.5 0.8 - 5.3 10e3/uL    Absolute Monocytes 0.8 0.0 - 1.3 10e3/uL    Absolute Eosinophils 0.3 0.0 - 0.7 10e3/uL    Absolute Basophils 0.2 0.0 - 0.2 10e3/uL    Absolute Immature Granulocytes 0.1 <=0.4 10e3/uL    Absolute NRBCs 0.2 10e3/uL   Comprehensive metabolic panel    Collection Time: 06/27/25  3:27 PM   Result Value Ref Range    Sodium 139 135 - 145 mmol/L    Potassium 3.7 3.4 - 5.3 mmol/L    Carbon Dioxide (CO2) 22 22 - 29 mmol/L    Anion Gap 12 7 - 15 mmol/L    Urea Nitrogen 5.8 (L) 6.0 - 20.0 mg/dL    Creatinine 0.50 (L) 0.51 - 0.95 mg/dL    GFR Estimate >90 >60 mL/min/1.73m2    Calcium 9.0 8.8 - 10.4 mg/dL    Chloride 105 98 - 107 mmol/L    Glucose 84 70 - 99 mg/dL    Alkaline Phosphatase 61 40 - 150 U/L    AST 54 (H) 0 - 45 U/L    ALT 33 0 - 50 U/L    Protein Total 8.0 6.4 - 8.3 g/dL    Albumin 4.7 3.5 - 5.2 g/dL    Bilirubin Total 2.4 (H) <=1.2 mg/dL   HCG qualitative pregnancy (blood)    Collection Time: 06/27/25  3:27 PM   Result Value Ref Range    hCG Serum Qualitative Negative Negative     I reviewed the above labs today.    Assessment and Plan:  1. Sickle Cell HgbSS Disease  2. Acute flare of chronic pain  3. Iron overload  4. 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 History of CVA  6. Hearing loss  7. Mental Health    Jennifer continues to work on management of acute on chronic pain. She will receive IVF and pain medication in infusion today. She continues to work on limiting ED visits as able.  Her outpatient infusion visits (currently  capped at 2x/week). I suspect her recent intermittent shortness of breath episodes are related to asthma. Oxygen saturation and labs are stable today. She denies viral illness symptoms despite her recent ill household contacts.    HbSS with acute on chronic pain and iron overload  -Continue HU 3000 mg daily  -Monthly crizanlizumab    Pain management  -Mix of pain medications including oxycodone, Robaxin, ibuprofen or naproxen, Tylenol. No more than 2 infusions per week. No change to oxycodone today  -Continue diligent home management with current medications, heat, rest, compression, warm baths.   -Continue to reassess plan for home oxycodone use monthly.  Continue prescriptions for 12 tablets oxycodone per week (10 mg tablets).  We can continue to decrease weekly if she desires and work jointly to set goals.  We are aiming to avoid any dose or quantity escalations which she continues to be successful with.   --If unable to manage at home can go to ED  with continued plan to use IV Dilaudid and take a break from ketamine (10/2/23). No PCA use and goal for any admission would still be to discharge by 5 days or less    Iron Overload  A Ferriscan was performed 1/30/25 showing a decrease in her average iron concentration, now 24 mg/g dry tissue, previously 31.8. Repeat a Ferriscan in 6 months (June 2025)--scheduled 6/2. She remains on Jadenu 1440 mg daily and deferiprone 2000 mg BID.    Stroke Hx  Holding off on chronic transfusions right now per mutually agreed upon management. Trying to reduce iron further before considering restart.  -Still on ASA    Mental Health  Now routinely following with therapy and health psychology. Finds this very beneficial in managing interpersonal relationships with both family and her healthcare team.    Health maintenance  Up-to-date      We will continue to see her monthly for ANDREAS/MD visits with her sidney infusions.       Patricia Mantilla CNP  ---  *** minutes spent on the date of the  encounter doing {2021 E&M time in:689594}.    The longitudinal plan of care for the diagnosis(es)/condition(s) as documented were addressed during this visit. Due to the added complexity in care, I will continue to support Jennifer in the subsequent management and with ongoing continuity of care.                  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    Interval History:  Jennifer is seen today per her request for hematology follow-up and discussion on pain control.    She has been frustrated with her pain over the past few weeks. Her pain has been more severe and difficult to control. The onset has been more abrupt making this less responsive to oxycodone. She continues to use Tylenol and ibuprofen in addition to heat and hydration at home. She cannot recall any specific triggers that would have increased her pain over the past few weeks.    She has been feeling disappointed in herself for going to the ED more than she'd care to but still reserves this for episodes of severe pain or when she can't get in to the infusion center due to availability. She requests to brainstorm a different strategy to treat her acute pain in an effort to reduce her ED utilization which is a primary goal of hers this year.       Sickle Cell Disease Comprehensive Checklist  Bone Health/Avascular Necrosis Screening/Symptoms (each visit): no new concerns today  Leg Ulcer evaluation (every visit): nothing to note  Hypertension (every visit):stable none for several months  Last pulmonary evaluation (asthma, AMAN, pulm HTN):  9/28/22, due for follow-up  Stroke/silent cerebral infarct Hx (Y/N): Yes TIA ~2014, first event ~age 1 with full stroke and R sided weakness  Last PCP Visit: 9/30/24 with Dr. Case (needs new PCP  Vaccines:  PCV13: 5/13/19  Pneumovax (PPSV23): 3/04, 10/09, 7/12/19, 10/2024  Menactra: 4/2010, 9/2015 (MCV done 8/16/21)  MenB: 9/16/15, 5/13/19, 1/30/25  Influenza: 10/11/24  Audiology (chelation): done 2/27/24    Comparison to Previous Audiogram dated 6/6/2022:     Pure Tone Thresholds 250-8000 Hz:    RIGHT: stable    LEFT: stable     High Frequency Audiometry 9,000-16,000Hz:     RIGHT: stable    LEFT: stable     Word Recognition Score:    RIGHT: stable    LEFT: stable    Plan last reviewed with patient: 3/17/25    Patient background: 26 yo F, enjoys movies and kids though there are times where she does not really want to talk to people. Does not have a lot of social support at home.     Sickle Cell Disease History  Primary Hematologist Team: Jose Rafael Duncan  PCP: none  Genotype: SS  Acute Pain Crisis Treatment: (limiting IV)   ER   Dilaudid 1 mg IV q1h up to 3 doses  Toradol 30 mg IV x1   Maintenance IV fluids with LR  Other: Zofran 8 mg IV PRN nausea  Inpatient:  PCA Dilaudid 1 hr q30 minutes, no basal rate  Toradol 30 mg x6h x 4 hr  LR maintenance x 1-2 days  Other Medications: Zofran  ASA  Supportive Care: Docusate, Senna  Chronic Pain Medications:    Home regimen: oxycodone 15 mg p.o. q.4-6 hours p.r.n. breakthrough pain.  She also continues on Voltaren gel, and Zoloft among other medications.    -Also benefits from mental health visits, acupuncture  Baseline Hemoglobin: 7 g/dl without chronic transfusions  Hydroxyurea use: Yes  H/O blood transfusions: Yes, several (iron overload) Most recent 11/20/2021  H/O Transfusion Reactions: no  Antibodies:none  H/O Acute Chest Syndrome: Yes  Last episode:9/05/22 (previously 4/26/21, 10/2019)   ICU/intubation: not with 9/2022 admission  H/O Stroke: Yes (managed with chronic  transfusions in the past, stopped late Spring 2020)  H/O VTE: Yes (2/2021)  H/O Cholecystectomy or Splenectomy: no  H/O Asthma, Pulm HTN, AVN, Leg Ulcers, Nephropathy, Retinopathy, etc: Iron overload, asthma, chronic lung disease, physical limitations from early stroke    ---------------------------------------  Jennifer Cervantes's Goals     1-3 month goal:  Continue weekly therapy    6 month goal:      12 month goal:      Disease-specific goal(s):  Continue to decrease ED visits  Use oxycodone on more of an as-needed basis rather than scheduled      Current Outpatient Medications   Medication Sig Dispense Refill     albuterol (PROVENTIL) (2.5 MG/3ML) 0.083% neb solution Take 1 vial (2.5 mg) by nebulization every 6 hours as needed for shortness of breath, wheezing or cough. 90 mL 0     aspirin (ASA) 81 MG chewable tablet Take 1 tablet (81 mg) by mouth 2 times daily 60 tablet 11     azelastine (ASTELIN) 0.1 % nasal spray Twice daily, spray once into each nostril after blowing your nose. Stand still for 1-3 minutes after spraying into nostril, to avoid dripping. After that, your may blow your nose again, to clear the remaining medication. 30 mL 4     budesonide-formoterol (SYMBICORT/BREYNA) 160-4.5 MCG/ACT Inhaler Inhale 2 puffs twice daily plus 1-2 puffs as needed. May use up to 12 puffs per day. 20.4 g 11     deferasirox (JADENU) 360 MG tablet Take 4 tablets (1,440 mg) by mouth daily. 120 tablet 11     Deferiprone, Twice Daily, 1000 MG TABS Take 2,000 mg by mouth 2 times daily. 150 tablet 3     diclofenac (VOLTAREN) 1 % topical gel Apply 4 g topically 4 times daily as needed (moderate knee pain). 350 g 1     diphenhydrAMINE (BENADRYL) 50 MG capsule Administer 12  hours pre - IV contrast injection and 2 hours prior to contrast. Patient must have a . 2 capsule 0     EPINEPHrine (ANY BX GENERIC EQUIV) 0.3 MG/0.3ML injection 2-pack Inject 0.3 mLs (0.3 mg) into the muscle as needed for anaphylaxis. 1 each 1      Fluticasone-Umeclidin-Vilant (TRELEGY ELLIPTA) 100-62.5-25 MCG/ACT oral inhaler Inhale 1 puff into the lungs daily. 60 each 2     hydroxyurea (HYDREA) 500 MG capsule Take 6 capsules (3,000 mg) by mouth daily 180 capsule 11     ibuprofen (ADVIL/MOTRIN) 800 MG tablet Take 800 mg by mouth every 8 hours as needed for moderate pain (Patient not taking: Reported on 6/23/2025)       ketotifen fumarate 0.035% 0.035 % SOLN ophthalmic solution Place 1 drop into both eyes 2 times daily as needed for itching. 10 mL 11     methocarbamol (ROBAXIN) 750 MG tablet Take 1 tablet (750 mg) by mouth 4 times daily as needed for muscle spasms (during sickle pain crises. Okay to take scheduled while in pain). 60 tablet 1     methylPREDNISolone (MEDROL) 32 MG tablet Take 1 tab 12 hours before appointment and 1 tab 2 hours prior to the procedure with IV contrast. 2 tablet 0     naloxone (NARCAN) 4 MG/0.1ML nasal spray Spray 4 mg into one nostril alternating nostrils as needed for opioid reversal. every 2-3 minutes until assistance arrives 0.2 mL 0     naproxen (NAPROSYN) 500 MG tablet Take 1 tablet (500 mg) by mouth daily as needed (pain). Take with food. Use sparingly and avoid taking on the same days you take ibuprofen. 30 tablet 1     oxyCODONE IR (ROXICODONE) 10 MG tablet Take 1 tablet (10 mg) by mouth every 6 hours as needed for moderate to severe pain. (Goal of less than 4 per day) 12 tablet 0     pantoprazole (PROTONIX) 40 MG EC tablet Take 1 tablet (40 mg) by mouth daily. 30 tablet 0     sertraline (ZOLOFT) 50 MG tablet Take 1 tablet (50 mg) by mouth daily. 90 tablet 3     Past Medical History  Past Medical History:   Diagnosis Date     Anxiety      Bleeding disorder      Blood clotting disorder      Cerebral infarction (H) 2015     Cognitive developmental delay     low IQ. Please recognize when managing pain and planning with her     Depressive disorder      Hemiplegia and hemiparesis following cerebral infarction affecting right  dominant side (H)     right hand contractures     Iron overload due to repeated red blood cell transfusions      Migraines      Multiple subsegmental pulmonary emboli without acute cor pulmonale (H) 02/01/2021     Oppositional defiant behavior      Presence of intrauterine contraceptive device 2/18/2020     Superficial venous thrombosis of arm, right 03/25/2021     Uncomplicated asthma      Past Surgical History:   Procedure Laterality Date     AS INSERT TUNNELED CV 2 CATH W/O PORT/PUMP       CHOLECYSTECTOMY       CV RIGHT HEART CATH MEASUREMENTS RECORDED N/A 11/18/2021    Procedure: Right Heart Cath;  Surgeon: Jackson Stauffer MD;  Location:  HE[facility] CARD[facility] CATH LAB     INSERT PORT VASCULAR ACCESS Left 4/21/2021    Procedure: INSERTION, VASCULAR ACCESS PORT (NOT SURE ON SIDE UNTIL REMOVAL);  Surgeon: Rajan More MD;  Location: [facility] OR     IR CHEST PORT PLACEMENT > 5 YRS OF AGE  4/21/2021     IR CVC NON TUNNEL LINE REMOVAL  5/6/2021     IR CVC NON TUNNEL PLACEMENT > 5 YRS  04/07/2020     IR CVC NON TUNNEL PLACEMENT > 5 YRS  4/30/2021     IR CVC NON TUNNEL PLACEMENT > 5 YRS  9/7/2022     IR PORT REMOVAL LEFT  4/21/2021     REMOVE PORT VASCULAR ACCESS Left 4/21/2021    Procedure: REMOVAL, VASCULAR ACCESS PORT LEFT;  Surgeon: Rajan More MD;  Location: [facility] OR     REPAIR TENDON ELBOW Right 10/02/2019    Procedure: Right Elbow Flexor Lengthening, Flexor Pronator Slide Of Wrist and Finger, Thumb Adductor Lengthening;  Surgeon: Anai Franco MD;  Location: [facility]     TONSILLECTOMY Bilateral 10/02/2019    Procedure: Bilateral Tonsillectomy;  Surgeon: Farhana Guy MD;  Location: [facility]     ZC BREAST REDUCTION (INCLUDES LIPO) TIER 3 Bilateral 04/23/2019     Allergies   Allergen Reactions     Contrast Dye Angioedema     Hives and breathing issues     Fish-Derived Products Hives     Seafood Hives     Adhesive Tape Hives     Primipore dressing causes hives     Gadolinium      Iodinated Contrast  Media      Social History   Social History     Tobacco Use     Smoking status: Never     Passive exposure: Never     Smokeless tobacco: Never   Substance Use Topics     Alcohol use: Not Currently     Alcohol/week: 0.0 standard drinks of alcohol     Drug use: Never   Past medical history and social history were reviewed.    Physical Examination:  /77 (BP Location: Right arm, Patient Position: Sitting, Cuff Size: Adult Regular)   Pulse 76   Temp 98.3  F (36.8  C) (Oral)   Resp 18   Wt 69.9 kg (154 lb)   SpO2 99%   BMI 26.43 kg/m      Wt Readings from Last 10 Encounters:   06/27/25 70.8 kg (156 lb)   06/24/25 71.1 kg (156 lb 12.8 oz)   06/20/25 71.2 kg (157 lb)   06/12/25 68 kg (150 lb)   06/06/25 70.3 kg (155 lb)   05/30/25 70.3 kg (155 lb)   05/28/25 70.7 kg (155 lb 12.8 oz)   05/24/25 71.2 kg (157 lb)   05/23/25 71.1 kg (156 lb 12.8 oz)   05/15/25 72.2 kg (159 lb 3.2 oz)     General: Pleasant female, NAD  Eyes: EOMI, PERRL  ENT: oral mucosa moist, pink  Respiratory: normal respiratory effort on RA  Ext: no peripheral edema  Neurologic: chronic contracture of right arm and wrist. Steady gait. A/O x 4.  Skin: No rashes, petechiae, or bruising noted on exposed skin.   Laboratory Data:  Recent Results (from the past 240 hours)   Comprehensive metabolic panel    Collection Time: 06/20/25  4:55 PM   Result Value Ref Range    Sodium 138 135 - 145 mmol/L    Potassium 4.1 3.4 - 5.3 mmol/L    Carbon Dioxide (CO2) 20 (L) 22 - 29 mmol/L    Anion Gap 14 7 - 15 mmol/L    Urea Nitrogen 9.2 6.0 - 20.0 mg/dL    Creatinine 0.51 0.51 - 0.95 mg/dL    GFR Estimate >90 >60 mL/min/1.73m2    Calcium 8.6 (L) 8.8 - 10.4 mg/dL    Chloride 104 98 - 107 mmol/L    Glucose 84 70 - 99 mg/dL    Alkaline Phosphatase 59 40 - 150 U/L    AST 56 (H) 0 - 45 U/L    ALT 32 0 - 50 U/L    Protein Total 7.7 6.4 - 8.3 g/dL    Albumin 4.5 3.5 - 5.2 g/dL    Bilirubin Total 3.3 (H) <=1.2 mg/dL   Reticulocyte count    Collection Time: 06/20/25  4:55 PM    Result Value Ref Range    % Reticulocyte 25.3 (H) 0.5 - 2.0 %    Absolute Reticulocyte 0.548 (H) 0.025 - 0.095 10e6/uL   CBC with platelets and differential    Collection Time: 06/20/25  4:55 PM   Result Value Ref Range    WBC Count 13.8 (H) 4.0 - 11.0 10e3/uL    RBC Count 2.46 (L) 3.80 - 5.20 10e6/uL    Hemoglobin 7.7 (L) 11.7 - 15.7 g/dL    Hematocrit 21.4 (L) 35.0 - 47.0 %    MCV 87 78 - 100 fL    MCH 31.3 26.5 - 33.0 pg    MCHC 36.0 31.5 - 36.5 g/dL    RDW 26.5 (H) 10.0 - 15.0 %    Platelet Count 475 (H) 150 - 450 10e3/uL    % Neutrophils 75 %    % Lymphocytes 14 %    % Monocytes 8 %    % Eosinophils 2 %    % Basophils 1 %    % Immature Granulocytes 0 %    NRBCs per 100 WBC 5 (H) <1 /100    Absolute Neutrophils 10.3 (H) 1.6 - 8.3 10e3/uL    Absolute Lymphocytes 1.9 0.8 - 5.3 10e3/uL    Absolute Monocytes 1.1 0.0 - 1.3 10e3/uL    Absolute Eosinophils 0.3 0.0 - 0.7 10e3/uL    Absolute Basophils 0.2 0.0 - 0.2 10e3/uL    Absolute Immature Granulocytes 0.1 <=0.4 10e3/uL    Absolute NRBCs 0.7 10e3/uL   IgA    Collection Time: 06/20/25  4:55 PM   Result Value Ref Range    Immunoglobulin A 195 84 - 499 mg/dL   Mitochondrial M2 Antibody IgG    Collection Time: 06/20/25  4:55 PM   Result Value Ref Range    Mitochondrial M2 Antibody IgG <1.0 <4.0 U/mL   UA with Microscopic reflex to Culture    Collection Time: 06/24/25  5:15 PM    Specimen: Urine, Midstream   Result Value Ref Range    Color Urine Light Yellow Colorless, Straw, Light Yellow, Yellow    Appearance Urine Clear Clear    Glucose Urine Negative Negative mg/dL    Bilirubin Urine Negative Negative    Ketones Urine Negative Negative mg/dL    Specific Gravity Urine 1.010 1.003 - 1.035    Blood Urine Negative Negative    pH Urine 6.0 5.0 - 7.0    Protein Albumin Urine Negative Negative mg/dL    Urobilinogen Urine Normal Normal mg/dL    Nitrite Urine Negative Negative    Leukocyte Esterase Urine Negative Negative    Bacteria Urine Few (A) None Seen /HPF    Mucus  Urine Present (A) None Seen /LPF    RBC Urine <1 <=2 /HPF    WBC Urine 1 <=5 /HPF    Squamous Epithelials Urine <1 <=1 /HPF   Comprehensive metabolic panel    Collection Time: 06/24/25  5:36 PM   Result Value Ref Range    Sodium 139 135 - 145 mmol/L    Potassium 3.8 3.4 - 5.3 mmol/L    Carbon Dioxide (CO2) 21 (L) 22 - 29 mmol/L    Anion Gap 14 7 - 15 mmol/L    Urea Nitrogen 9.9 6.0 - 20.0 mg/dL    Creatinine 0.52 0.51 - 0.95 mg/dL    GFR Estimate >90 >60 mL/min/1.73m2    Calcium 8.8 8.8 - 10.4 mg/dL    Chloride 104 98 - 107 mmol/L    Glucose 97 70 - 99 mg/dL    Alkaline Phosphatase 61 40 - 150 U/L    AST 65 (H) 0 - 45 U/L    ALT 43 0 - 50 U/L    Protein Total 8.0 6.4 - 8.3 g/dL    Albumin 4.7 3.5 - 5.2 g/dL    Bilirubin Total 2.5 (H) <=1.2 mg/dL   Lipase    Collection Time: 06/24/25  5:36 PM   Result Value Ref Range    Lipase 30 13 - 60 U/L   Reticulocyte count    Collection Time: 06/24/25  5:36 PM   Result Value Ref Range    % Reticulocyte 17.6 (H) 0.5 - 2.0 %    Absolute Reticulocyte 0.423 (H) 0.025 - 0.095 10e6/uL   HCG qualitative Blood    Collection Time: 06/24/25  5:36 PM   Result Value Ref Range    hCG Serum Qualitative Negative Negative   CBC with platelets and differential    Collection Time: 06/24/25  5:36 PM   Result Value Ref Range    WBC Count 11.1 (H) 4.0 - 11.0 10e3/uL    RBC Count 2.37 (L) 3.80 - 5.20 10e6/uL    Hemoglobin 7.3 (L) 11.7 - 15.7 g/dL    Hematocrit 20.0 (L) 35.0 - 47.0 %    MCV 84 78 - 100 fL    MCH 30.8 26.5 - 33.0 pg    MCHC 36.5 31.5 - 36.5 g/dL    RDW 24.1 (H) 10.0 - 15.0 %    Platelet Count 476 (H) 150 - 450 10e3/uL    % Neutrophils 64 %    % Lymphocytes 20 %    % Monocytes 11 %    % Eosinophils 3 %    % Basophils 2 %    % Immature Granulocytes 1 %    NRBCs per 100 WBC 2 (H) <1 /100    Absolute Neutrophils 7.1 1.6 - 8.3 10e3/uL    Absolute Lymphocytes 2.2 0.8 - 5.3 10e3/uL    Absolute Monocytes 1.2 0.0 - 1.3 10e3/uL    Absolute Eosinophils 0.4 0.0 - 0.7 10e3/uL    Absolute Basophils  0.2 0.0 - 0.2 10e3/uL    Absolute Immature Granulocytes 0.1 <=0.4 10e3/uL    Absolute NRBCs 0.3 10e3/uL   Reticulocyte count    Collection Time: 06/27/25  3:26 PM   Result Value Ref Range    % Reticulocyte 22.9 (H) 0.5 - 2.0 %    Absolute Reticulocyte 0.578 (H) 0.025 - 0.095 10e6/uL   CBC with platelets and differential    Collection Time: 06/27/25  3:26 PM   Result Value Ref Range    WBC Count 11.2 (H) 4.0 - 11.0 10e3/uL    RBC Count 2.53 (L) 3.80 - 5.20 10e6/uL    Hemoglobin 7.9 (L) 11.7 - 15.7 g/dL    Hematocrit 22.4 (L) 35.0 - 47.0 %    MCV 89 78 - 100 fL    MCH 31.2 26.5 - 33.0 pg    MCHC 35.3 31.5 - 36.5 g/dL    RDW 25.2 (H) 10.0 - 15.0 %    Platelet Count 526 (H) 150 - 450 10e3/uL    % Neutrophils 66 %    % Lymphocytes 22 %    % Monocytes 8 %    % Eosinophils 2 %    % Basophils 2 %    % Immature Granulocytes 0 %    NRBCs per 100 WBC 1 (H) <1 /100    Absolute Neutrophils 7.3 1.6 - 8.3 10e3/uL    Absolute Lymphocytes 2.5 0.8 - 5.3 10e3/uL    Absolute Monocytes 0.8 0.0 - 1.3 10e3/uL    Absolute Eosinophils 0.3 0.0 - 0.7 10e3/uL    Absolute Basophils 0.2 0.0 - 0.2 10e3/uL    Absolute Immature Granulocytes 0.1 <=0.4 10e3/uL    Absolute NRBCs 0.2 10e3/uL   Comprehensive metabolic panel    Collection Time: 06/27/25  3:27 PM   Result Value Ref Range    Sodium 139 135 - 145 mmol/L    Potassium 3.7 3.4 - 5.3 mmol/L    Carbon Dioxide (CO2) 22 22 - 29 mmol/L    Anion Gap 12 7 - 15 mmol/L    Urea Nitrogen 5.8 (L) 6.0 - 20.0 mg/dL    Creatinine 0.50 (L) 0.51 - 0.95 mg/dL    GFR Estimate >90 >60 mL/min/1.73m2    Calcium 9.0 8.8 - 10.4 mg/dL    Chloride 105 98 - 107 mmol/L    Glucose 84 70 - 99 mg/dL    Alkaline Phosphatase 61 40 - 150 U/L    AST 54 (H) 0 - 45 U/L    ALT 33 0 - 50 U/L    Protein Total 8.0 6.4 - 8.3 g/dL    Albumin 4.7 3.5 - 5.2 g/dL    Bilirubin Total 2.4 (H) <=1.2 mg/dL   HCG qualitative pregnancy (blood)    Collection Time: 06/27/25  3:27 PM   Result Value Ref Range    hCG Serum Qualitative Negative  Negative     I reviewed the above labs today.    Assessment and Plan:  1. Sickle Cell HgbSS Disease  2. Acute flare of chronic pain  3. Iron overload  4. 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 History of CVA  6. Hearing loss  7. Mental Health    Jennifer continues to work on management of acute on chronic pain. She will receive IVF and pain medication in infusion today. Her outpatient infusion visits are currently capped at 2x/week although, as usual, her ability to get in for infusion varies based on availability the day of the request.    She feels disappointed in herself for her slow uptick in ED visits at the year has progressed. Currently we do not alter her ability to come in to the infusion center based on recent ED visits. She has been thinking of ways to decrease ED use and initially asked that we consider limiting both her outpatient infusion visits further based on whether she has also had ED visits within the week. On review of her infusion visits and ED visits from the past month, she averages 3 visits per week for additional IVF and IV pain medication either in the infusion center or ED. It would be reasonable to limit her total weekly infusion to 2 infusion or ED visits per week which would be a reduction in her current weekly average.     Alternatively, we discussed adjusting the way she take her oral oxycodone at home. Typically she is using the oxycodone daily taking 1 tablet in the AM and PM. Although her total daily MME has significantly decreased over time, this is likely still treating her chronic pain and she will likely find more benefit when truly using this as needed.     She prefers to work on using her oxycodone as needed.           HbSS with acute on chronic pain and iron overload  -Continue HU 3000 mg daily  -Monthly crizanlizumab    Pain management  -Mix of pain medications including oxycodone, Robaxin, ibuprofen or naproxen, Tylenol. No more than 2  infusions per week. No change to oxycodone today  -Continue diligent home management with current medications, heat, rest, compression, warm baths.   -Continue to reassess plan for home oxycodone use monthly.  Continue prescriptions for 12 tablets oxycodone per week (10 mg tablets).  We can continue to decrease weekly if she desires and work jointly to set goals.  We are aiming to avoid any dose or quantity escalations which she continues to be successful with.   --If unable to manage at home can go to ED  with continued plan to use IV Dilaudid and take a break from ketamine (10/2/23). No PCA use and goal for any admission would still be to discharge by 5 days or less    Iron Overload  A Ferriscan was performed 1/30/25 showing a decrease in her average iron concentration, now 24 mg/g dry tissue, previously 31.8. Repeat a Ferriscan in 6 months (June 2025)--scheduled 6/2. She remains on Jadenu 1440 mg daily and deferiprone 2000 mg BID.    Stroke Hx  Holding off on chronic transfusions right now per mutually agreed upon management. Trying to reduce iron further before considering restart.  -Still on ASA    Mental Health  Now routinely following with therapy and health psychology. Finds this very beneficial in managing interpersonal relationships with both family and her healthcare team.    Health maintenance  Up-to-date      We will continue to see her monthly for ANDREAS/MD visits with her sidney infusions.       Patricia Mantilla CNP  ---  *** minutes spent on the date of the encounter doing {2021 E&M time in:838446}.    The longitudinal plan of care for the diagnosis(es)/condition(s) as documented were addressed during this visit. Due to the added complexity in care, I will continue to support Jennifer in the subsequent management and with ongoing continuity of care.                    Again, thank you for allowing me to participate in the care of your patient.        Sincerely,        Patricia Mantilla CNP    Electronically signed

## 2025-07-01 NOTE — PATIENT INSTRUCTIONS
[facility] Triage and after hours / weekends / holidays:  [phone] option 5, option 2    Please call the triage or after hours line if you experience a temperature greater than or equal to 100.4, shaking chills, have uncontrolled nausea, vomiting and/or diarrhea, dizziness, shortness of breath, chest pain, bleeding, unexplained bruising, or if you have any other new/concerning symptoms, questions or concerns.      If you are having any concerning symptoms or wish to speak to a provider before your next infusion visit, please call triage to notify your care team so we can adequately serve you.     If you need a refill on a narcotic prescription or other medication, please call before your infusion appointment.      July 2025 Sunday Monday Tuesday Wednesday Thursday Friday Saturday             1    Return Patient   7:45 AM   (45 min.)   Patricia Mantilla CNP   [facility]    Infusion 120   9:00 AM   (120 min.)    ONC INFUSION NURSE   [facility] 2     3    NEW VISIT  11:25 AM   (20 min.)   Alexy Navarrete MD   [facility] [place] 4     5       6     7     8     9     10     11     12       13     14    MR ABDOMEN W/O CONTRAST   7:30 AM   (40 min.)   [email or web address]   [facility] 15     16     17     18    Lab Central  10:00 AM   (15 min.)   [facility]   [facility]    Return Active Treatment  10:15 AM   (45 min.)   Patricia Mantilla CNP   [facility]    Infusion 240  11:30 AM   (240 min.)    ONC INFUSION NURSE   [facility] 19       20     21    New Patient Visit   8:15 AM   (30 min.)   Chen Preciado MD   [facility] [place]    US ABDOMEN LIMITED   9:15 AM   (30 min.)   [email or web address]   [facility]    Xray General   9:45 AM   (20 min.)    [id number]   [facility] [place] 22     23     24    Neurotoxin   7:45 AM   (60 min.)   Katherine Pierce MD   [facility] 25     26       27     28    Extremity Treatment   8:20 AM   (40 min.)   Emma Garcia PT   [facility] 29     30     31 August 2025 Sunday Monday Tuesday Wednesday Thursday Friday Saturday                            1     2       3     4    Extremity Treatment   8:20 AM   (40 min.)   Emma Garcia PT   [facility] 5     6     7     8     9       10     11    Extremity Treatment   8:20 AM   (40 min.)   Emma Garcia PT   [facility] 12     13     14    Pharmacist Visit  11:00 AM   (60 min.)   [place] RPH   [facility] 15    Lab Central  10:00 AM   (15 min.)    [facility] DRAW   [facility]    Return Active Treatment  10:15 AM   (45 min.)   Patricia Mantilla CNP   [facility]    Infusion 240  12:00 PM   (240 min.)    ONC INFUSION NURSE   [facility] 16       17     18     19     20     21     22     23       24     25     26     27     28     29     30       31                                                    No results found for this or any previous visit (from the past 24 hours).

## 2025-07-03 ENCOUNTER — VIRTUAL VISIT (OUTPATIENT)
Dept: PSYCHOLOGY | Facility: CLINIC | Age: 25
End: 2025-07-03
Payer: MEDICAID

## 2025-07-03 ENCOUNTER — INFUSION THERAPY VISIT (OUTPATIENT)
Dept: INFUSION THERAPY | Facility: CLINIC | Age: 25
End: 2025-07-03
Attending: PEDIATRICS
Payer: MEDICAID

## 2025-07-03 ENCOUNTER — PATIENT OUTREACH (OUTPATIENT)
Dept: CARE COORDINATION | Facility: CLINIC | Age: 25
End: 2025-07-03

## 2025-07-03 ENCOUNTER — PRE VISIT (OUTPATIENT)
Dept: ORTHOPEDICS | Facility: CLINIC | Age: 25
End: 2025-07-03

## 2025-07-03 ENCOUNTER — NURSE TRIAGE (OUTPATIENT)
Dept: ONCOLOGY | Facility: CLINIC | Age: 25
End: 2025-07-03

## 2025-07-03 VITALS
RESPIRATION RATE: 16 BRPM | TEMPERATURE: 98.5 F | DIASTOLIC BLOOD PRESSURE: 68 MMHG | OXYGEN SATURATION: 94 % | HEART RATE: 91 BPM | SYSTOLIC BLOOD PRESSURE: 111 MMHG

## 2025-07-03 DIAGNOSIS — F54 PSYCHOLOGICAL AND BEHAVIORAL FACTORS ASSOCIATED WITH DISORDERS OR DISEASES CLASSIFIED ELSEWHERE: ICD-10-CM

## 2025-07-03 DIAGNOSIS — D57.00 SICKLE CELL PAIN CRISIS (H): Primary | ICD-10-CM

## 2025-07-03 DIAGNOSIS — G89.4 CHRONIC PAIN ASSOCIATED WITH SIGNIFICANT PSYCHOSOCIAL DYSFUNCTION: Primary | ICD-10-CM

## 2025-07-03 DIAGNOSIS — G81.10 SPASTIC HEMIPLEGIA, UNSPECIFIED ETIOLOGY, UNSPECIFIED LATERALITY (H): ICD-10-CM

## 2025-07-03 DIAGNOSIS — F34.1 PERSISTENT DEPRESSIVE DISORDER: ICD-10-CM

## 2025-07-03 PROCEDURE — 258N000003 HC RX IP 258 OP 636: Performed by: PEDIATRICS

## 2025-07-03 PROCEDURE — 250N000013 HC RX MED GY IP 250 OP 250 PS 637: Performed by: PEDIATRICS

## 2025-07-03 PROCEDURE — 250N000011 HC RX IP 250 OP 636: Performed by: PEDIATRICS

## 2025-07-03 RX ORDER — DIPHENHYDRAMINE HCL 25 MG
50 CAPSULE ORAL
Status: COMPLETED | OUTPATIENT
Start: 2025-07-03 | End: 2025-07-03

## 2025-07-03 RX ORDER — ONDANSETRON 2 MG/ML
8 INJECTION INTRAMUSCULAR; INTRAVENOUS EVERY 6 HOURS PRN
Start: 2025-08-01

## 2025-07-03 RX ORDER — HEPARIN SODIUM,PORCINE 10 UNIT/ML
5 VIAL (ML) INTRAVENOUS
OUTPATIENT
Start: 2025-08-01

## 2025-07-03 RX ORDER — HEPARIN SODIUM (PORCINE) LOCK FLUSH IV SOLN 100 UNIT/ML 100 UNIT/ML
5 SOLUTION INTRAVENOUS
OUTPATIENT
Start: 2025-08-01

## 2025-07-03 RX ORDER — KETOROLAC TROMETHAMINE 30 MG/ML
30 INJECTION, SOLUTION INTRAMUSCULAR; INTRAVENOUS ONCE
Start: 2025-08-01 | End: 2025-08-01

## 2025-07-03 RX ORDER — KETOROLAC TROMETHAMINE 30 MG/ML
30 INJECTION, SOLUTION INTRAMUSCULAR; INTRAVENOUS ONCE
Status: COMPLETED | OUTPATIENT
Start: 2025-07-03 | End: 2025-07-03

## 2025-07-03 RX ORDER — HEPARIN SODIUM (PORCINE) LOCK FLUSH IV SOLN 100 UNIT/ML 100 UNIT/ML
5 SOLUTION INTRAVENOUS
Status: DISCONTINUED | OUTPATIENT
Start: 2025-07-03 | End: 2025-07-03 | Stop reason: HOSPADM

## 2025-07-03 RX ORDER — DIPHENHYDRAMINE HCL 25 MG
50 CAPSULE ORAL
Start: 2025-08-01

## 2025-07-03 RX ORDER — ONDANSETRON 4 MG/1
8 TABLET, FILM COATED ORAL
Start: 2025-08-01

## 2025-07-03 RX ORDER — ONDANSETRON 2 MG/ML
8 INJECTION INTRAMUSCULAR; INTRAVENOUS EVERY 6 HOURS PRN
Status: DISCONTINUED | OUTPATIENT
Start: 2025-07-03 | End: 2025-07-03 | Stop reason: HOSPADM

## 2025-07-03 RX ADMIN — KETOROLAC TROMETHAMINE 30 MG: 30 INJECTION, SOLUTION INTRAMUSCULAR; INTRAVENOUS at 08:51

## 2025-07-03 RX ADMIN — HYDROMORPHONE HYDROCHLORIDE 1 MG: 1 INJECTION, SOLUTION INTRAMUSCULAR; INTRAVENOUS; SUBCUTANEOUS at 10:48

## 2025-07-03 RX ADMIN — HYDROMORPHONE HYDROCHLORIDE 1 MG: 1 INJECTION, SOLUTION INTRAMUSCULAR; INTRAVENOUS; SUBCUTANEOUS at 08:49

## 2025-07-03 RX ADMIN — HYDROMORPHONE HYDROCHLORIDE 1 MG: 1 INJECTION, SOLUTION INTRAMUSCULAR; INTRAVENOUS; SUBCUTANEOUS at 09:50

## 2025-07-03 RX ADMIN — DIPHENHYDRAMINE HYDROCHLORIDE 50 MG: 25 CAPSULE ORAL at 08:48

## 2025-07-03 RX ADMIN — SODIUM CHLORIDE, SODIUM LACTATE, POTASSIUM CHLORIDE, AND CALCIUM CHLORIDE 1000 ML: .6; .31; .03; .02 INJECTION, SOLUTION INTRAVENOUS at 08:44

## 2025-07-03 RX ADMIN — Medication 5 ML: at 11:18

## 2025-07-03 RX ADMIN — ONDANSETRON 8 MG: 2 INJECTION INTRAMUSCULAR; INTRAVENOUS at 08:54

## 2025-07-03 ASSESSMENT — ANXIETY QUESTIONNAIRES
GAD7 TOTAL SCORE: 2
4. TROUBLE RELAXING: NOT AT ALL
2. NOT BEING ABLE TO STOP OR CONTROL WORRYING: NOT AT ALL
GAD7 TOTAL SCORE: 2
GAD7 TOTAL SCORE: 2
7. FEELING AFRAID AS IF SOMETHING AWFUL MIGHT HAPPEN: NOT AT ALL
IF YOU CHECKED OFF ANY PROBLEMS ON THIS QUESTIONNAIRE, HOW DIFFICULT HAVE THESE PROBLEMS MADE IT FOR YOU TO DO YOUR WORK, TAKE CARE OF THINGS AT HOME, OR GET ALONG WITH OTHER PEOPLE: NOT DIFFICULT AT ALL
7. FEELING AFRAID AS IF SOMETHING AWFUL MIGHT HAPPEN: NOT AT ALL
1. FEELING NERVOUS, ANXIOUS, OR ON EDGE: NOT AT ALL
3. WORRYING TOO MUCH ABOUT DIFFERENT THINGS: NOT AT ALL
5. BEING SO RESTLESS THAT IT IS HARD TO SIT STILL: NOT AT ALL
6. BECOMING EASILY ANNOYED OR IRRITABLE: MORE THAN HALF THE DAYS
8. IF YOU CHECKED OFF ANY PROBLEMS, HOW DIFFICULT HAVE THESE MADE IT FOR YOU TO DO YOUR WORK, TAKE CARE OF THINGS AT HOME, OR GET ALONG WITH OTHER PEOPLE?: NOT DIFFICULT AT ALL

## 2025-07-03 NOTE — TELEPHONE ENCOUNTER
Oncology Nurse Triage - Sickle Cell Pain Crisis:    Situation: Jennifer  calling about Sickle Cell Pain Crisis, requesting to be added on for IV fluids and pain medicine    Background:     Patient's last infusion was 7/1/24  Last clinic visit date:7/1/24 Patricia johnson   Does patient have active treatment plan? Yes    Assessment of Symptoms:  Onset/Duration of symptoms: 1 day    Is it typical sickle cell pain? Yes  Location: back   Character: Sharp  Intensity: 8/10    Any radiation of pain, numbness, tingling, weakness, warmth, swelling, discoloration of arms or legs?No    Fever? No  (if yes max temperature recorded in last 24 hours):      Chest Pain Present: No    Shortness of breath: No    Other home therapies tried: HEAT/HEATING PAD and WARM BATH     Last home medication taken and when: 10pm oxycodone     Any Refills Needed?: No    Does patient have transportation & length of time to get to clinic: No wants to use medical transport       Recommendations:     Placed on infusion call list     If you do not hear from the infusion center by 2pm then you will not be able to get in for an infusion today. If symptoms worsen while waiting for call back, and/or you experience fever, chills, SOB, chest pain, cough, n/v, dizziness, numbness, swelling, discoloration of extremities, then seek emergency evaluation in Emergency Department.     Please note, if you are late for your appt, you risk losing your infusion appt as it may delay another patient's infusion who arrived on time.

## 2025-07-03 NOTE — PROGRESS NOTES
Nursing Note  Jennifer Cervantes presents today to [facility] for:   Chief Complaint   Patient presents with    Infusion     Fluids and pain, anti-emetic medications     During today's [facility] appointment, orders from Dr. Duncan were completed.  Frequency: as needed    Progress note:  Patient identification verified by name and date of birth.  Assessment completed.  Vitals recorded in Doc Flowsheets.  Patient was provided with education regarding medication/procedure and possible side effects.  Patient verbalized understanding.     present during visit today: Not Applicable.    Treatment Conditions: Pt received 1L IVF, Zofran, Benadryl, Toradol and Dilaudid x 3 with effective relief to pain.    Infusion length and rate:  infusion given over approximately 2 hours  Port accessed today.    Is the next appt scheduled? Yes    Post Infusion Assessment:  Patient tolerated infusion without incident.     Discharge Plan:   Follow up plan of care with: ordering provider as scheduled.  Discharge instructions were reviewed with patient.  Patient/representative verbalized understanding of discharge instructions and all questions answered.  Patient discharged from [facility] in stable condition.    Patricia Qureshi RN    Administrations This Visit       diphenhydrAMINE (BENADRYL) capsule 50 mg       Admin Date  07/03/2025 Action  $Given Dose  50 mg Route  Oral Documented By  Patricia Qureshi RN              heparin lock flush 100 unit/mL injection 5 mL       Admin Date  07/03/2025 Action  $Given Dose  5 mL Route  Intracatheter Documented By  Patricia Qureshi RN              HYDROmorphone (DILAUDID) injection 1 mg       Admin Date  07/03/2025 Action  $Given Dose  1 mg Route  Intravenous Documented By  Patricia Qureshi RN               Admin Date  07/03/2025 Action  $Given Dose  1 mg Route  Intravenous Documented By  Patricia Qureshi  JOE VERMA               Admin Date  07/03/2025 Action  $Given Dose  1 mg Route  Intravenous Documented By  Patricia Qureshi RN              ketorolac (TORADOL) injection 30 mg       Admin Date  07/03/2025 Action  $Given Dose  30 mg Route  Intravenous Documented By  Patricia Qureshi RN              lactated ringers BOLUS 1,000 mL       Admin Date  07/03/2025 Action  $New Bag Dose  1,000 mL Rate  500 mL/hr Route  Intravenous Documented By  Patricia Qureshi RN              ondansetron (ZOFRAN) injection 8 mg       Admin Date  07/03/2025 Action  $Given Dose  8 mg Route  Intravenous Documented By  Patricia Qureshi RN                    /78 (BP Location: Left arm)   Pulse 87   Temp 98.5  F (36.9  C) (Oral)   Resp 16   SpO2 94%

## 2025-07-03 NOTE — TELEPHONE ENCOUNTER
[facility] can take at 8:30.     Call placed to Jennifer she will try to make it by 8:30 I did tell her if she was later than 8:30 she might be turned away.      Message sent to [facility] to schedule     Message sent to Social workers to arrange ride home from IVF/PM.

## 2025-07-03 NOTE — PROGRESS NOTES
Ambulatory Care Management- Transportation Support  Specialty SW - [facility]     Data/Intervention:  Patient Name:    Jennifer Cervantes     /Age: 2000 (25 year old)     [facility] team member assisting Specialty SW with transportation request.      Assessment:  CHW/CTA scheduled online with [employer] to arrange medical appointment transportation in conjunction with patient's insurance.     Taxi company: [employer]  reference number is [id number]    Patient will need to call above taxi company at phone number: [phone] when ready for return ride home.      Plan:  Patient is aware of the transportation plan.  available to assist with any other needs.      Lisandra Kilgore MA

## 2025-07-04 ENCOUNTER — HOSPITAL ENCOUNTER (EMERGENCY)
Facility: CLINIC | Age: 25
Discharge: HOME | End: 2025-07-04
Attending: STUDENT IN AN ORGANIZED HEALTH CARE EDUCATION/TRAINING PROGRAM | Admitting: STUDENT IN AN ORGANIZED HEALTH CARE EDUCATION/TRAINING PROGRAM
Payer: MEDICAID

## 2025-07-04 VITALS
TEMPERATURE: 98.3 F | DIASTOLIC BLOOD PRESSURE: 71 MMHG | HEART RATE: 76 BPM | OXYGEN SATURATION: 92 % | RESPIRATION RATE: 14 BRPM | SYSTOLIC BLOOD PRESSURE: 116 MMHG

## 2025-07-04 DIAGNOSIS — D57.00 SICKLE CELL PAIN CRISIS (H): ICD-10-CM

## 2025-07-04 LAB
ALBUMIN SERPL BCG-MCNC: 4.4 G/DL (ref 3.5–5.2)
ALP SERPL-CCNC: 59 U/L (ref 40–150)
ALT SERPL W P-5'-P-CCNC: 29 U/L (ref 0–50)
ANION GAP SERPL CALCULATED.3IONS-SCNC: 12 MMOL/L (ref 7–15)
AST SERPL W P-5'-P-CCNC: 47 U/L (ref 0–45)
BASOPHILS # BLD AUTO: 0.3 10E3/UL (ref 0–0.2)
BASOPHILS NFR BLD AUTO: 3 %
BILIRUB DIRECT SERPL-MCNC: 0.62 MG/DL (ref 0–0.3)
BILIRUB SERPL-MCNC: 3 MG/DL
BUN SERPL-MCNC: 6.1 MG/DL (ref 6–20)
CALCIUM SERPL-MCNC: 8.6 MG/DL (ref 8.8–10.4)
CHLORIDE SERPL-SCNC: 104 MMOL/L (ref 98–107)
CREAT SERPL-MCNC: 0.51 MG/DL (ref 0.51–0.95)
EGFRCR SERPLBLD CKD-EPI 2021: >90 ML/MIN/1.73M2
EOSINOPHIL # BLD AUTO: 0.4 10E3/UL (ref 0–0.7)
EOSINOPHIL NFR BLD AUTO: 4 %
ERYTHROCYTE [DISTWIDTH] IN BLOOD BY AUTOMATED COUNT: 23.9 % (ref 10–15)
GLUCOSE SERPL-MCNC: 90 MG/DL (ref 70–99)
HCO3 SERPL-SCNC: 22 MMOL/L (ref 22–29)
HCT VFR BLD AUTO: 20.6 % (ref 35–47)
HGB BLD-MCNC: 7.3 G/DL (ref 11.7–15.7)
IMM GRANULOCYTES # BLD: 0.1 10E3/UL
IMM GRANULOCYTES NFR BLD: 1 %
LYMPHOCYTES # BLD AUTO: 2.2 10E3/UL (ref 0.8–5.3)
LYMPHOCYTES NFR BLD AUTO: 20 %
MCH RBC QN AUTO: 31.6 PG (ref 26.5–33)
MCHC RBC AUTO-ENTMCNC: 35.4 G/DL (ref 31.5–36.5)
MCV RBC AUTO: 89 FL (ref 78–100)
MONOCYTES # BLD AUTO: 1 10E3/UL (ref 0–1.3)
MONOCYTES NFR BLD AUTO: 9 %
NEUTROPHILS # BLD AUTO: 7.1 10E3/UL (ref 1.6–8.3)
NEUTROPHILS NFR BLD AUTO: 65 %
NRBC # BLD AUTO: 0.2 10E3/UL
NRBC BLD AUTO-RTO: 2 /100
PLATELET # BLD AUTO: 461 10E3/UL (ref 150–450)
POTASSIUM SERPL-SCNC: 4 MMOL/L (ref 3.4–5.3)
PROT SERPL-MCNC: 7.3 G/DL (ref 6.4–8.3)
RBC # BLD AUTO: 2.31 10E6/UL (ref 3.8–5.2)
RETICS # AUTO: 0.47 10E6/UL (ref 0.03–0.1)
RETICS/RBC NFR AUTO: 20.2 % (ref 0.5–2)
SODIUM SERPL-SCNC: 138 MMOL/L (ref 135–145)
WBC # BLD AUTO: 11 10E3/UL (ref 4–11)

## 2025-07-04 PROCEDURE — 85025 COMPLETE CBC W/AUTO DIFF WBC: CPT | Performed by: STUDENT IN AN ORGANIZED HEALTH CARE EDUCATION/TRAINING PROGRAM

## 2025-07-04 PROCEDURE — 85045 AUTOMATED RETICULOCYTE COUNT: CPT | Performed by: STUDENT IN AN ORGANIZED HEALTH CARE EDUCATION/TRAINING PROGRAM

## 2025-07-04 PROCEDURE — 250N000011 HC RX IP 250 OP 636: Performed by: STUDENT IN AN ORGANIZED HEALTH CARE EDUCATION/TRAINING PROGRAM

## 2025-07-04 PROCEDURE — 250N000013 HC RX MED GY IP 250 OP 250 PS 637: Performed by: STUDENT IN AN ORGANIZED HEALTH CARE EDUCATION/TRAINING PROGRAM

## 2025-07-04 PROCEDURE — 96376 TX/PRO/DX INJ SAME DRUG ADON: CPT | Performed by: STUDENT IN AN ORGANIZED HEALTH CARE EDUCATION/TRAINING PROGRAM

## 2025-07-04 PROCEDURE — 96375 TX/PRO/DX INJ NEW DRUG ADDON: CPT | Performed by: STUDENT IN AN ORGANIZED HEALTH CARE EDUCATION/TRAINING PROGRAM

## 2025-07-04 PROCEDURE — 99284 EMERGENCY DEPT VISIT MOD MDM: CPT | Mod: 25 | Performed by: STUDENT IN AN ORGANIZED HEALTH CARE EDUCATION/TRAINING PROGRAM

## 2025-07-04 PROCEDURE — 82248 BILIRUBIN DIRECT: CPT | Performed by: STUDENT IN AN ORGANIZED HEALTH CARE EDUCATION/TRAINING PROGRAM

## 2025-07-04 PROCEDURE — 80048 BASIC METABOLIC PNL TOTAL CA: CPT | Performed by: STUDENT IN AN ORGANIZED HEALTH CARE EDUCATION/TRAINING PROGRAM

## 2025-07-04 PROCEDURE — 99285 EMERGENCY DEPT VISIT HI MDM: CPT | Performed by: STUDENT IN AN ORGANIZED HEALTH CARE EDUCATION/TRAINING PROGRAM

## 2025-07-04 PROCEDURE — 36415 COLL VENOUS BLD VENIPUNCTURE: CPT | Performed by: STUDENT IN AN ORGANIZED HEALTH CARE EDUCATION/TRAINING PROGRAM

## 2025-07-04 PROCEDURE — 96374 THER/PROPH/DIAG INJ IV PUSH: CPT | Performed by: STUDENT IN AN ORGANIZED HEALTH CARE EDUCATION/TRAINING PROGRAM

## 2025-07-04 RX ORDER — ONDANSETRON 2 MG/ML
8 INJECTION INTRAMUSCULAR; INTRAVENOUS
Status: COMPLETED | OUTPATIENT
Start: 2025-07-04 | End: 2025-07-04

## 2025-07-04 RX ORDER — KETOROLAC TROMETHAMINE 30 MG/ML
30 INJECTION, SOLUTION INTRAMUSCULAR; INTRAVENOUS ONCE
Status: COMPLETED | OUTPATIENT
Start: 2025-07-04 | End: 2025-07-04

## 2025-07-04 RX ORDER — ACETAMINOPHEN 500 MG
1000 TABLET ORAL ONCE
Status: COMPLETED | OUTPATIENT
Start: 2025-07-04 | End: 2025-07-04

## 2025-07-04 RX ORDER — OXYCODONE HYDROCHLORIDE 10 MG/1
10 TABLET ORAL ONCE
Refills: 0 | Status: COMPLETED | OUTPATIENT
Start: 2025-07-04 | End: 2025-07-04

## 2025-07-04 RX ORDER — HEPARIN SODIUM (PORCINE) LOCK FLUSH IV SOLN 100 UNIT/ML 100 UNIT/ML
100 SOLUTION INTRAVENOUS ONCE
Status: COMPLETED | OUTPATIENT
Start: 2025-07-04 | End: 2025-07-04

## 2025-07-04 RX ADMIN — ACETAMINOPHEN 1000 MG: 500 TABLET ORAL at 14:02

## 2025-07-04 RX ADMIN — OXYCODONE HYDROCHLORIDE 10 MG: 10 TABLET ORAL at 15:06

## 2025-07-04 RX ADMIN — HYDROMORPHONE HYDROCHLORIDE 1 MG: 1 INJECTION, SOLUTION INTRAMUSCULAR; INTRAVENOUS; SUBCUTANEOUS at 14:44

## 2025-07-04 RX ADMIN — HEPARIN 100 UNITS: 100 SYRINGE at 16:31

## 2025-07-04 RX ADMIN — HYDROMORPHONE HYDROCHLORIDE 1 MG: 1 INJECTION, SOLUTION INTRAMUSCULAR; INTRAVENOUS; SUBCUTANEOUS at 15:34

## 2025-07-04 RX ADMIN — ONDANSETRON 8 MG: 2 INJECTION INTRAMUSCULAR; INTRAVENOUS at 14:03

## 2025-07-04 RX ADMIN — KETOROLAC TROMETHAMINE 30 MG: 30 INJECTION, SOLUTION INTRAMUSCULAR at 14:02

## 2025-07-04 RX ADMIN — HYDROMORPHONE HYDROCHLORIDE 1 MG: 1 INJECTION, SOLUTION INTRAMUSCULAR; INTRAVENOUS; SUBCUTANEOUS at 14:04

## 2025-07-04 ASSESSMENT — ACTIVITIES OF DAILY LIVING (ADL)
ADLS_ACUITY_SCORE: 59
ADLS_ACUITY_SCORE: 59
ADLS_ACUITY_SCORE: 58
ADLS_ACUITY_SCORE: 58

## 2025-07-04 NOTE — DISCHARGE INSTRUCTIONS
Thank you you for coming to the [facility].  We are glad you are feeling better.    Please continue to follow-up closely with your hematology team.  If you develop new or concerning symptoms before your next follow-up appointment do not hesitate to return to the ED.

## 2025-07-04 NOTE — ED PROVIDER NOTES
[facility] EMERGENCY DEPARTMENT ([facility])    7/04/25      ED PROVIDER NOTE      History     Chief Complaint   Patient presents with    Sickle Cell Pain Crisis     Pt states she took oxycodone 10mh x3 which did not help with pain.     HPI  Jennifer Cervantes is a 25 year old female with history of sickle cell disease, CVA with residual spastic hemiplegia who presents to the ED with sickle cell crisis. Patient reports she forgot to turn off her central air and slept with her back exposed. She woke up with tension and pain in her back that feels similar to previous sickle cell crises. She took her normal pain medications but did not find relief and decided to come to the ED. She reports she ran into a door on the way here and hurt her shoulder. Patient reports she has been doing well with the outpatient clinic and is happy this is her first visit this month, hopes it will be her only visit. She thinks that she should only need 2 doses today, not the full 3.    Past Medical History  Past Medical History:   Diagnosis Date    Anxiety     Bleeding disorder     Blood clotting disorder     Cerebral infarction (H) 2015    Cognitive developmental delay     low IQ. 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 side (H)     right hand contractures    Iron overload due to repeated red blood cell transfusions     Migraines     Multiple subsegmental pulmonary emboli without acute cor pulmonale (H) 02/01/2021    Oppositional defiant behavior     Presence of intrauterine contraceptive device 2/18/2020    Superficial venous thrombosis of arm, right 03/25/2021    Uncomplicated asthma      Past Surgical History:   Procedure Laterality Date    AS INSERT TUNNELED CV 2 CATH W/O PORT/PUMP      CHOLECYSTECTOMY      CV RIGHT HEART CATH MEASUREMENTS RECORDED N/A 11/18/2021    Procedure: Right Heart Cath;  Surgeon:  Jackson Stauffer MD;  Location:  HEART CARDIAC CATH LAB    INSERT PORT VASCULAR ACCESS Left 4/21/2021    Procedure: INSERTION, VASCULAR ACCESS PORT (NOT SURE ON SIDE UNTIL REMOVAL);  Surgeon: Rajan More MD;  Location: [facility] OR    IR CHEST PORT PLACEMENT > 5 YRS OF AGE  4/21/2021    IR CVC NON TUNNEL LINE REMOVAL  5/6/2021    IR CVC NON TUNNEL PLACEMENT > 5 YRS  04/07/2020    IR CVC NON TUNNEL PLACEMENT > 5 YRS  4/30/2021    IR CVC NON TUNNEL PLACEMENT > 5 YRS  9/7/2022    IR PORT REMOVAL LEFT  4/21/2021    REMOVE PORT VASCULAR ACCESS Left 4/21/2021    Procedure: REMOVAL, VASCULAR ACCESS PORT LEFT;  Surgeon: Rajan More MD;  Location: [facility] OR    REPAIR TENDON ELBOW Right 10/02/2019    Procedure: Right Elbow Flexor Lengthening, Flexor Pronator Slide Of Wrist and Finger, Thumb Adductor Lengthening;  Surgeon: Anai Franco MD;  Location: [facility] OR    TONSILLECTOMY Bilateral 10/02/2019    Procedure: Bilateral Tonsillectomy;  Surgeon: Farhana Guy MD;  Location: [facility] OR    ZZC BREAST REDUCTION (INCLUDES LIPO) TIER 3 Bilateral 04/23/2019     oxyCODONE IR (ROXICODONE) 10 MG tablet  albuterol (PROVENTIL) (2.5 MG/3ML) 0.083% neb solution  aspirin (ASA) 81 MG chewable tablet  azelastine (ASTELIN) 0.1 % nasal spray  budesonide-formoterol (SYMBICORT/BREYNA) 160-4.5 MCG/ACT Inhaler  deferasirox (JADENU) 360 MG tablet  Deferiprone, Twice Daily, 1000 MG TABS  diclofenac (VOLTAREN) 1 % topical gel  diphenhydrAMINE (BENADRYL) 50 MG capsule  EPINEPHrine (ANY BX GENERIC EQUIV) 0.3 MG/0.3ML injection 2-pack  Fluticasone-Umeclidin-Vilant (TRELEGY ELLIPTA) 100-62.5-25 MCG/ACT oral inhaler  hydroxyurea (HYDREA) 500 MG capsule  ibuprofen (ADVIL/MOTRIN) 800 MG tablet  ketotifen fumarate 0.035% 0.035 % SOLN ophthalmic solution  methocarbamol (ROBAXIN) 750 MG tablet  methylPREDNISolone (MEDROL) 32 MG tablet  naloxone (NARCAN) 4 MG/0.1ML nasal spray  naproxen (NAPROSYN) 500 MG tablet  pantoprazole (PROTONIX) 40 MG EC  tablet  sertraline (ZOLOFT) 50 MG tablet      Allergies   Allergen Reactions    Contrast Dye Angioedema     Hives and breathing issues    Fish-Derived Products Hives    Seafood Hives    Adhesive Tape Hives     Primipore dressing causes hives    Gadolinium     Iodinated Contrast Media      Family History  Family History   Problem Relation Age of Onset    Sickle Cell Trait Mother     Hypertension Mother     Asthma Mother     Sickle Cell Trait Father     Glaucoma No family hx of     Macular Degeneration No family hx of     Diabetes No family hx of     Gout No family hx of      Social History   Social History     Tobacco Use    Smoking status: Never     Passive exposure: Never    Smokeless tobacco: Never   Substance Use Topics    Alcohol use: Not Currently     Alcohol/week: 0.0 standard drinks of alcohol    Drug use: Never      Past medical history, past surgical history, medications, allergies, family history, and social history were reviewed with the patient. No additional pertinent items.   A complete review of systems was performed with pertinent positives and negatives noted in the HPI, and all other systems negative.    Physical Exam   BP: 123/72  Pulse: 83  Temp: 98.3  F (36.8  C)  Resp: 18  SpO2: 94 %  Physical Exam  Vital Signs Reviewed  Gen: Well nourished, well developed, resting comfortably, no acute distress  HEENT: NC/AT, PERRL, EOMI, MMM  Neck: Supple, FROM  CV: Regular Rate  Lungs/Chest: Normal Effort  Abd: Non-distended  MSK/Back: FROM, no visible deformity. Tenderness right lateral mid back.  Neuro: A&Ox3, GCS 15, CN II-XII unremarkable. Strength and sensation globally intact.  Skin: Warm, Dry, Intact, no visible lesions    ED Course, Procedures, & Data     ED Course as of 07/04/25 1706   Fri Jul 04, 2025   1525 Still having pain despite 2 doses of dilaudid + oxy. Will give 3rd dose, also hot pack.     Procedures                Results for orders placed or performed during the hospital encounter of  07/04/25   Basic metabolic panel   Result Value Ref Range    Sodium 138 135 - 145 mmol/L    Potassium 4.0 3.4 - 5.3 mmol/L    Chloride 104 98 - 107 mmol/L    Carbon Dioxide (CO2) 22 22 - 29 mmol/L    Anion Gap 12 7 - 15 mmol/L    Urea Nitrogen 6.1 6.0 - 20.0 mg/dL    Creatinine 0.51 0.51 - 0.95 mg/dL    GFR Estimate >90 >60 mL/min/1.73m2    Calcium 8.6 (L) 8.8 - 10.4 mg/dL    Glucose 90 70 - 99 mg/dL   Reticulocyte count   Result Value Ref Range    % Reticulocyte 20.2 (H) 0.5 - 2.0 %    Absolute Reticulocyte 0.468 (H) 0.025 - 0.095 10e6/uL   Hepatic function panel   Result Value Ref Range    Protein Total 7.3 6.4 - 8.3 g/dL    Albumin 4.4 3.5 - 5.2 g/dL    Bilirubin Total 3.0 (H) <=1.2 mg/dL    Alkaline Phosphatase 59 40 - 150 U/L    AST 47 (H) 0 - 45 U/L    ALT 29 0 - 50 U/L    Bilirubin Direct 0.62 (H) 0.00 - 0.30 mg/dL   CBC with platelets and differential   Result Value Ref Range    WBC Count 11.0 4.0 - 11.0 10e3/uL    RBC Count 2.31 (L) 3.80 - 5.20 10e6/uL    Hemoglobin 7.3 (L) 11.7 - 15.7 g/dL    Hematocrit 20.6 (L) 35.0 - 47.0 %    MCV 89 78 - 100 fL    MCH 31.6 26.5 - 33.0 pg    MCHC 35.4 31.5 - 36.5 g/dL    RDW 23.9 (H) 10.0 - 15.0 %    Platelet Count 461 (H) 150 - 450 10e3/uL    % Neutrophils 65 %    % Lymphocytes 20 %    % Monocytes 9 %    % Eosinophils 4 %    % Basophils 3 %    % Immature Granulocytes 1 %    NRBCs per 100 WBC 2 (H) <1 /100    Absolute Neutrophils 7.1 1.6 - 8.3 10e3/uL    Absolute Lymphocytes 2.2 0.8 - 5.3 10e3/uL    Absolute Monocytes 1.0 0.0 - 1.3 10e3/uL    Absolute Eosinophils 0.4 0.0 - 0.7 10e3/uL    Absolute Basophils 0.3 (H) 0.0 - 0.2 10e3/uL    Absolute Immature Granulocytes 0.1 <=0.4 10e3/uL    Absolute NRBCs 0.2 10e3/uL     Medications   ketorolac (TORADOL) injection 30 mg (30 mg Intravenous $Given 7/4/25 6865)   ondansetron (ZOFRAN) injection 8 mg (8 mg Intravenous $Given 7/4/25 8100)   HYDROmorphone (DILAUDID) injection 1 mg (1 mg Intravenous $Given 7/4/25 6518)    acetaminophen (TYLENOL) tablet 1,000 mg (1,000 mg Oral $Given 7/4/25 1402)   oxyCODONE IR (ROXICODONE) tablet 10 mg (10 mg Oral $Given 7/4/25 1506)   heparin lock flush 100 unit/mL injection 100 Units (100 Units Intravenous $Given 7/4/25 1631)          Critical care was not performed.     Medical Decision Making  The patient's presentation was of high complexity (a chronic illness severe exacerbation, progression, or side effect of treatment).    The patient's evaluation involved:  ordering and/or review of 3+ test(s) in this encounter (see separate area of note for details)    The patient's management necessitated high risk (a parenteral controlled substance).    Assessment & Plan    Jennifer Cervantes is a 25 year old female with history of sickle cell disease, CVA with residual spastic hemiplegia who presents to the ED with sickle cell crisis.  Patient thinks crisis was provoked by temperature changes.  She is afebrile with reassuring vital signs.  No symptoms of acute chest.    Care plan was reviewed and initiated.  She was given an extra dose of oral oxycodone.  After completion of the care plan patient reports pain is improved to the point where she feels that she can discharge home and continue with home medications.    Patient is aware that if she develops new or concerning symptoms before next outpatient follow-up she should promptly return to the emergency department.    I have reviewed the nursing notes. I have reviewed the findings, diagnosis, plan and need for follow up with the patient.    New Prescriptions    No medications on file       Final diagnoses:   Sickle cell pain crisis (H)     Jan NEWMAN, am serving as a trained medical scribe to document services personally performed by Jesus Rhodes Jr., MD based on the provider's statements to me on July 4, 2025.  This document has been checked and approved by the attending provider.    I, Jesus Rhodes Jr., MD, was physically present  and have reviewed and verified the accuracy of this note documented by Jan Raymond, medical scribe.      Jesus Rhodes Jr., MD  [facility] EMERGENCY DEPARTMENT  7/4/2025     Jesus Rhodes MD  07/04/25 3786

## 2025-07-04 NOTE — ED NOTES
Port de-accessed, vitals taken. Pt education completed. Pt had no questions for the writer. AVS given to pt. Cab booked for the pt.

## 2025-07-07 ENCOUNTER — NURSE TRIAGE (OUTPATIENT)
Dept: ONCOLOGY | Facility: CLINIC | Age: 25
End: 2025-07-07
Payer: MEDICAID

## 2025-07-07 ENCOUNTER — INFUSION THERAPY VISIT (OUTPATIENT)
Dept: ONCOLOGY | Facility: CLINIC | Age: 25
End: 2025-07-07
Attending: PEDIATRICS
Payer: MEDICAID

## 2025-07-07 ENCOUNTER — PATIENT OUTREACH (OUTPATIENT)
Dept: CARE COORDINATION | Facility: CLINIC | Age: 25
End: 2025-07-07
Payer: MEDICAID

## 2025-07-07 VITALS
OXYGEN SATURATION: 98 % | RESPIRATION RATE: 15 BRPM | SYSTOLIC BLOOD PRESSURE: 121 MMHG | TEMPERATURE: 98.3 F | DIASTOLIC BLOOD PRESSURE: 70 MMHG | HEART RATE: 98 BPM

## 2025-07-07 DIAGNOSIS — D57.00 SICKLE CELL PAIN CRISIS (H): Primary | ICD-10-CM

## 2025-07-07 DIAGNOSIS — G81.10 SPASTIC HEMIPLEGIA, UNSPECIFIED ETIOLOGY, UNSPECIFIED LATERALITY (H): ICD-10-CM

## 2025-07-07 DIAGNOSIS — D57.00 SICKLE CELL DISEASE WITH CRISIS (H): ICD-10-CM

## 2025-07-07 PROCEDURE — 96374 THER/PROPH/DIAG INJ IV PUSH: CPT

## 2025-07-07 PROCEDURE — 96376 TX/PRO/DX INJ SAME DRUG ADON: CPT

## 2025-07-07 PROCEDURE — 96361 HYDRATE IV INFUSION ADD-ON: CPT

## 2025-07-07 PROCEDURE — 250N000011 HC RX IP 250 OP 636: Performed by: PEDIATRICS

## 2025-07-07 PROCEDURE — 96375 TX/PRO/DX INJ NEW DRUG ADDON: CPT

## 2025-07-07 PROCEDURE — 258N000003 HC RX IP 258 OP 636: Performed by: PEDIATRICS

## 2025-07-07 PROCEDURE — 250N000013 HC RX MED GY IP 250 OP 250 PS 637: Performed by: PEDIATRICS

## 2025-07-07 RX ORDER — KETOROLAC TROMETHAMINE 30 MG/ML
30 INJECTION, SOLUTION INTRAMUSCULAR; INTRAVENOUS ONCE
Start: 2025-08-01 | End: 2025-08-01

## 2025-07-07 RX ORDER — ONDANSETRON 4 MG/1
8 TABLET, FILM COATED ORAL
Start: 2025-08-01

## 2025-07-07 RX ORDER — DIPHENHYDRAMINE HCL 25 MG
50 CAPSULE ORAL
Status: COMPLETED | OUTPATIENT
Start: 2025-07-07 | End: 2025-07-07

## 2025-07-07 RX ORDER — OXYCODONE HYDROCHLORIDE 10 MG/1
10 TABLET ORAL EVERY 6 HOURS PRN
Qty: 12 TABLET | Refills: 0 | Status: SHIPPED | OUTPATIENT
Start: 2025-07-07

## 2025-07-07 RX ORDER — ONDANSETRON 2 MG/ML
8 INJECTION INTRAMUSCULAR; INTRAVENOUS EVERY 6 HOURS PRN
Start: 2025-08-01

## 2025-07-07 RX ORDER — HEPARIN SODIUM,PORCINE 10 UNIT/ML
5 VIAL (ML) INTRAVENOUS
OUTPATIENT
Start: 2025-08-01

## 2025-07-07 RX ORDER — DIPHENHYDRAMINE HCL 25 MG
50 CAPSULE ORAL
Start: 2025-08-01

## 2025-07-07 RX ORDER — HEPARIN SODIUM (PORCINE) LOCK FLUSH IV SOLN 100 UNIT/ML 100 UNIT/ML
5 SOLUTION INTRAVENOUS
OUTPATIENT
Start: 2025-08-01

## 2025-07-07 RX ORDER — ONDANSETRON 2 MG/ML
8 INJECTION INTRAMUSCULAR; INTRAVENOUS EVERY 6 HOURS PRN
Status: DISCONTINUED | OUTPATIENT
Start: 2025-07-07 | End: 2025-07-07 | Stop reason: HOSPADM

## 2025-07-07 RX ORDER — KETOROLAC TROMETHAMINE 30 MG/ML
30 INJECTION, SOLUTION INTRAMUSCULAR; INTRAVENOUS ONCE
Status: COMPLETED | OUTPATIENT
Start: 2025-07-07 | End: 2025-07-07

## 2025-07-07 RX ADMIN — DIPHENHYDRAMINE HYDROCHLORIDE 50 MG: 25 CAPSULE ORAL at 11:30

## 2025-07-07 RX ADMIN — ONDANSETRON 8 MG: 2 INJECTION INTRAMUSCULAR; INTRAVENOUS at 11:32

## 2025-07-07 RX ADMIN — KETOROLAC TROMETHAMINE 30 MG: 30 INJECTION, SOLUTION INTRAMUSCULAR; INTRAVENOUS at 12:35

## 2025-07-07 RX ADMIN — HYDROMORPHONE HYDROCHLORIDE 1 MG: 1 INJECTION, SOLUTION INTRAMUSCULAR; INTRAVENOUS; SUBCUTANEOUS at 13:32

## 2025-07-07 RX ADMIN — HYDROMORPHONE HYDROCHLORIDE 1 MG: 1 INJECTION, SOLUTION INTRAMUSCULAR; INTRAVENOUS; SUBCUTANEOUS at 12:33

## 2025-07-07 RX ADMIN — SODIUM CHLORIDE, SODIUM LACTATE, POTASSIUM CHLORIDE, AND CALCIUM CHLORIDE 1000 ML: .6; .31; .03; .02 INJECTION, SOLUTION INTRAVENOUS at 11:26

## 2025-07-07 RX ADMIN — HYDROMORPHONE HYDROCHLORIDE 1 MG: 1 INJECTION, SOLUTION INTRAMUSCULAR; INTRAVENOUS; SUBCUTANEOUS at 11:31

## 2025-07-07 ASSESSMENT — PAIN SCALES - GENERAL: PAINLEVEL_OUTOF10: SEVERE PAIN (9)

## 2025-07-07 NOTE — PATIENT INSTRUCTIONS
Contact Numbers    [facility] Main Line (for Scheduling/Triage/After Hours Nurse Line): [phone]    Please call the [facility] nurse triage or the after hours nurse line if you experience a temperature greater than or equal to 100.4, shaking chills, have uncontrolled nausea, vomiting and/or diarrhea, dizziness, lightheadedness, shortness of breath, chest pain, bleeding, unexplained bruising, or if you have any other new/concerning symptoms, questions or concerns.     If you are having any concerning symptoms or wish to speak to a provider before your next infusion visit, please call your care coordinator or triage to notify them so we can adequately serve you.     If you need any refills on medications (narcotics or other medications), please call before your infusion appointment.

## 2025-07-07 NOTE — TELEPHONE ENCOUNTER
Narcotic Refill Request  Person Requesting Refill: Jennifer    Medication(s) requested:  oxycodone 10 mg tablet  Last fill date and by whom:  6/30/25 by Patricia Mantilla CNP  What pain is the medication treating: sickle cell pain   How is the medication being taken?: Q 6H when needed and Q 4H when in pain.   Does pt have enough for today? No  Is pain being adequately controlled on the current regimen?: Yes  Experiencing any side effects from medication?: No    Date of most recent appointment:  7/1/25 with Patricia Mantilla CNP  Any No Show Visits:No  Next appointment:   7/18/25 with Patricia Mantilla CNP     Reviewed: No access    Routed/Paged provider: Patricia Mantilla CNP and MAURISIO Asencio

## 2025-07-07 NOTE — PROGRESS NOTES
Infusion Nursing Note:  Jennifer Cervantes presents today for IVF/Pain Medications.    Patient seen by provider today: No   present during visit today: Not Applicable.    Note: Patient presents today for infusion. She rates her pain as 9/10 on arrival. Pain is generalized. Patient denies any fevers or chills. Denies any acute symptoms of ACS. Patient reports she is drinking plenty of fluids at home. Patient is agreeable to treatment plan for today. She was instructed to call care team with any new or worsening symptoms or with any questions or concerns.     Prior to dismissal patient rated pain as 4/10. Patient feels comfortable going home today. Patient instructed to call care team if symptoms worsen.     Medications Administered:  Dilaudid x3 doses, Zofran, Tylenol, Toradol , 1 L LR, Benadryl 50 mg    Intravenous Access:  Implanted Port.    Treatment Conditions:  Not Applicable.  /70 (Cuff Size: Adult Regular)   Pulse 98   Temp 98.3  F (36.8  C) (Oral)   Resp 15   SpO2 98%     Post Infusion Assessment:  Patient tolerated infusion without incident.  Blood return noted pre and post infusion.  Site patent and intact, free from redness, edema or discomfort.  No evidence of extravasations.  Access discontinued per protocol.     Discharge Plan:   Prescription refill given for Oxycodone.   Discharge instructions reviewed with: Patient.  Patient and/or family verbalized understanding of discharge instructions and all questions answered.  AVS to patient via [employer]. Patient will return 7/18 for next Jr appointment.   Patient discharged in stable condition accompanied by: self.  Departure Mode: Ambulatory.    Mariah Saenz RN

## 2025-07-07 NOTE — TELEPHONE ENCOUNTER
Oncology Nurse Triage - Sickle Cell Pain Crisis:  Situation: Jennifer  calling about Sickle Cell Pain Crisis, requesting to be added on for IV fluids and pain medicine    Background:     Patient's last infusion was In ED on 7/4/25  Last clinic visit date: 7/1/25 with Patricia Mantilla CNP  Does patient have active treatment plan? Yes    Assessment of Symptoms:  Onset/Duration of symptoms: 1 day    Is it typical sickle cell pain? Yes  Location: generalized  Character: Sharp  Intensity: 8/10    Any radiation of pain, numbness, tingling, weakness, warmth, swelling, discoloration of arms or legs?No    Fever? No  Chest Pain Present: No  Shortness of breath: No    Other home therapies tried: HEAT/HEATING PAD and WARM BATH     Last home medication taken and when: 2300 took last oxycodone    Any Refills Needed?: Pended up in separate refill encounter.    Does patient have transportation & length of time to get to clinic: Has transportation if something opens up pretty soon; otherwise needs transportation. Is 20 minutes away from clinic.    Recommendations:   If you do not hear from the infusion center by 2pm then you will not be able to get in for an infusion today. If symptoms worsen while waiting for call back, and/or you experience fever, chills, SOB, chest pain, cough, n/v, dizziness, numbness, swelling, discoloration of extremities, then seek emergency evaluation in Emergency Department.     Pt voiced understanding.    Added to infusion wait list.    0737: Per Lina, Charge Nurse, Ange: can offer 1100 apt.  0738: Call made to Jennifer who confirmed she can come to apt at 1100 and will need transportation to and from apt.   0739: Updated infusion charge nurse.   Message sent to  to assist with transportation.  Message sent to [facility] to schedule apt.    Message routed to Care Team.

## 2025-07-07 NOTE — PROGRESS NOTES
Ambulatory Care Management- Transportation Support  Specialty SW - [facility]     Data/Intervention:  Patient Name:    Jennifer Cervantes     /Age: 2000 (25 year old)     [facility] team member assisting Specialty SW with transportation request.      Assessment:  CHW/CTA booked online a ride with [employer] to arrange medical appointment transportation in conjunction with patient's insurance. Reference number is [id number] Booking # is [id number]    Taxi company: [employer]    Patient will need to call above taxi company at phone number: [phone] when ready for return ride home.      Plan:  Patient is aware of the transportation plan.  available to assist with any other needs.      Maritza Vick MA

## 2025-07-08 NOTE — CONFIDENTIAL NOTE
"Health Psychology - Follow up Visit  Confidential Summary*     The author of this note documented a reason for not sharing it with the patient.     REFERRAL SOURCE:  PCP     CHIEF COMPLAINT/REASON FOR VISIT  Cognitive behavioral therapy and behavioral counseling in context of chronic pain associated with sickle cell disease with recurrent hospitalizations, asthma, depressed mood and emotion dysregulation associated with family discord.       Patient seen for 60 minute psychotherapy session.  Patient was at [facility] receiving infusion for treatment.  Patient was also receiving pain medication.  Session provided via [employer].      Patient has agreed to receiving telehealth services.      The patient has been notified of following:   \"This video visit was conducted via video call between you and your physician/provider. We have found that certain health care needs can be provided without the need for an in-person physical exam. Video visits may be billed at different rates depending on your insurance coverage.  Please reach out to your insurance provider with any questions. If during the course of the call the physician/provider feels a video visit is not appropriate, you will not be charged for this service.\"     Patient has given verbal consent for telephone/Video visit? Yes       Subjective:  Patient was receiving scheduled infusion during session.  She reported that she continues to experience chronic pain and that her provider team does not understand her perception that her sickle cell pain condition is different than that of others and that the pain medications and scheduled infusion does not work for her in the same way that it does for other patients.      She also described her perception that her family does not care for her in the same way that she cares for them.  Encouraged her to consider that she is someone with big emotions and that being empathic comes more easily to her and that her family is " "different.  We discussed how she might identify others who are more similar to her in the way they treat her for more mutually satisfying relationships.  Discussed how she this dynamic may also exist in her new relationships.      Encouraged her to consider how she might establish and keep boundaries on her finances and emotions and vulnerability to family.      Objective:  Patient was on time for today s session, appropriately groomed and dressed.  She appeared but alert and oriented. Mood was dysphoric, with appropriate range of affect including tearfulness. Patient denied suicidal or assaultive ideation, plan, or intent.        Assessment:  The patient is coping with sickle cell disease and perception of racism associated with accusations that she might be misusing pain medications.  She also described challenges with regulating her emotions and having recurrent interpersonal discord., particularly with her family.  She also described chronic depressive symptoms and that she \"cries daily\".  She has an outside therapist and would like our sessions to focus on increasing her awareness of emotional health.    Answers submitted by the patient for this visit:  Patient Health Questionnaire (G7) (Submitted on 7/3/2025)  STEVE 7 TOTAL SCORE: 2     Plan:  See in 1 week, encouraged engaging in weekly therapy.       Time In:  10:00  Time Out: 11:00     Diagnosis:  Axis I Persistent depressive disorder vs recurrent MDD, current episode moderate; chronic pain; psychological factors associated with medical condition   Axis II Deferred   Axis III please see medical records for details   [place] IV Psychosocial and Environmental Stressors:Health and racial disparities         TREATMENT PLAN:  updated     Shandra Caruso, PhD, LP        *In accordance with the Rules of the [place] Board of [employer], it is noted that psychological descriptions and scientific procedures underlying psychological evaluations have limitations.  Absolute " predictions cannot be made based on information in this report.

## 2025-07-09 ENCOUNTER — NURSE TRIAGE (OUTPATIENT)
Dept: ONCOLOGY | Facility: CLINIC | Age: 25
End: 2025-07-09
Payer: MEDICAID

## 2025-07-09 ENCOUNTER — HOSPITAL ENCOUNTER (EMERGENCY)
Facility: CLINIC | Age: 25
Discharge: HOME | End: 2025-07-09
Attending: FAMILY MEDICINE
Payer: MEDICAID

## 2025-07-09 VITALS
OXYGEN SATURATION: 99 % | DIASTOLIC BLOOD PRESSURE: 57 MMHG | SYSTOLIC BLOOD PRESSURE: 101 MMHG | HEIGHT: 64 IN | BODY MASS INDEX: 26.46 KG/M2 | TEMPERATURE: 98.4 F | RESPIRATION RATE: 18 BRPM | WEIGHT: 155 LBS | HEART RATE: 101 BPM

## 2025-07-09 DIAGNOSIS — D57.00 SICKLE CELL PAIN CRISIS (H): ICD-10-CM

## 2025-07-09 LAB
ALBUMIN SERPL BCG-MCNC: 4.7 G/DL (ref 3.5–5.2)
ALP SERPL-CCNC: 59 U/L (ref 40–150)
ALT SERPL W P-5'-P-CCNC: 36 U/L (ref 0–50)
ANION GAP SERPL CALCULATED.3IONS-SCNC: 16 MMOL/L (ref 7–15)
AST SERPL W P-5'-P-CCNC: 59 U/L (ref 0–45)
BASOPHILS # BLD AUTO: 0.2 10E3/UL (ref 0–0.2)
BASOPHILS NFR BLD AUTO: 2 %
BILIRUB SERPL-MCNC: 2.8 MG/DL
BUN SERPL-MCNC: 7.7 MG/DL (ref 6–20)
CALCIUM SERPL-MCNC: 8.8 MG/DL (ref 8.8–10.4)
CHLORIDE SERPL-SCNC: 104 MMOL/L (ref 98–107)
CREAT SERPL-MCNC: 0.51 MG/DL (ref 0.51–0.95)
EGFRCR SERPLBLD CKD-EPI 2021: >90 ML/MIN/1.73M2
EOSINOPHIL # BLD AUTO: 0.3 10E3/UL (ref 0–0.7)
EOSINOPHIL NFR BLD AUTO: 3 %
ERYTHROCYTE [DISTWIDTH] IN BLOOD BY AUTOMATED COUNT: 22.5 % (ref 10–15)
GLUCOSE SERPL-MCNC: 90 MG/DL (ref 70–99)
HCO3 SERPL-SCNC: 19 MMOL/L (ref 22–29)
HCT VFR BLD AUTO: 21.3 % (ref 35–47)
HGB BLD-MCNC: 7.7 G/DL (ref 11.7–15.7)
IMM GRANULOCYTES # BLD: 0.1 10E3/UL
IMM GRANULOCYTES NFR BLD: 0 %
LYMPHOCYTES # BLD AUTO: 2.2 10E3/UL (ref 0.8–5.3)
LYMPHOCYTES NFR BLD AUTO: 19 %
MCH RBC QN AUTO: 31.6 PG (ref 26.5–33)
MCHC RBC AUTO-ENTMCNC: 36.2 G/DL (ref 31.5–36.5)
MCV RBC AUTO: 87 FL (ref 78–100)
MONOCYTES # BLD AUTO: 0.8 10E3/UL (ref 0–1.3)
MONOCYTES NFR BLD AUTO: 7 %
NEUTROPHILS # BLD AUTO: 8 10E3/UL (ref 1.6–8.3)
NEUTROPHILS NFR BLD AUTO: 69 %
NRBC # BLD AUTO: 0.1 10E3/UL
NRBC BLD AUTO-RTO: 1 /100
PLATELET # BLD AUTO: 431 10E3/UL (ref 150–450)
POTASSIUM SERPL-SCNC: 4 MMOL/L (ref 3.4–5.3)
PROT SERPL-MCNC: 8 G/DL (ref 6.4–8.3)
RBC # BLD AUTO: 2.44 10E6/UL (ref 3.8–5.2)
RETICS # AUTO: 0.18 10E6/UL (ref 0.03–0.1)
RETICS/RBC NFR AUTO: 15.7 % (ref 0.5–2)
SODIUM SERPL-SCNC: 139 MMOL/L (ref 135–145)
WBC # BLD AUTO: 11.6 10E3/UL (ref 4–11)

## 2025-07-09 PROCEDURE — 96375 TX/PRO/DX INJ NEW DRUG ADDON: CPT | Performed by: FAMILY MEDICINE

## 2025-07-09 PROCEDURE — 99284 EMERGENCY DEPT VISIT MOD MDM: CPT | Mod: 25 | Performed by: FAMILY MEDICINE

## 2025-07-09 PROCEDURE — 250N000011 HC RX IP 250 OP 636: Performed by: FAMILY MEDICINE

## 2025-07-09 PROCEDURE — 99285 EMERGENCY DEPT VISIT HI MDM: CPT | Performed by: FAMILY MEDICINE

## 2025-07-09 PROCEDURE — 85004 AUTOMATED DIFF WBC COUNT: CPT | Performed by: FAMILY MEDICINE

## 2025-07-09 PROCEDURE — 36415 COLL VENOUS BLD VENIPUNCTURE: CPT | Performed by: FAMILY MEDICINE

## 2025-07-09 PROCEDURE — 258N000003 HC RX IP 258 OP 636: Performed by: FAMILY MEDICINE

## 2025-07-09 PROCEDURE — 85045 AUTOMATED RETICULOCYTE COUNT: CPT | Performed by: FAMILY MEDICINE

## 2025-07-09 PROCEDURE — 80053 COMPREHEN METABOLIC PANEL: CPT | Performed by: FAMILY MEDICINE

## 2025-07-09 PROCEDURE — 96374 THER/PROPH/DIAG INJ IV PUSH: CPT | Performed by: FAMILY MEDICINE

## 2025-07-09 PROCEDURE — 96376 TX/PRO/DX INJ SAME DRUG ADON: CPT | Performed by: FAMILY MEDICINE

## 2025-07-09 PROCEDURE — 96361 HYDRATE IV INFUSION ADD-ON: CPT | Performed by: FAMILY MEDICINE

## 2025-07-09 RX ORDER — ONDANSETRON 2 MG/ML
8 INJECTION INTRAMUSCULAR; INTRAVENOUS ONCE
Status: COMPLETED | OUTPATIENT
Start: 2025-07-09 | End: 2025-07-09

## 2025-07-09 RX ORDER — ONDANSETRON 2 MG/ML
INJECTION INTRAMUSCULAR; INTRAVENOUS
Status: DISCONTINUED
Start: 2025-07-09 | End: 2025-07-09 | Stop reason: HOSPADM

## 2025-07-09 RX ORDER — SODIUM CHLORIDE, SODIUM LACTATE, POTASSIUM CHLORIDE, CALCIUM CHLORIDE 600; 310; 30; 20 MG/100ML; MG/100ML; MG/100ML; MG/100ML
INJECTION, SOLUTION INTRAVENOUS CONTINUOUS
Status: DISCONTINUED | OUTPATIENT
Start: 2025-07-09 | End: 2025-07-09 | Stop reason: HOSPADM

## 2025-07-09 RX ORDER — HEPARIN SODIUM (PORCINE) LOCK FLUSH IV SOLN 100 UNIT/ML 100 UNIT/ML
100 SOLUTION INTRAVENOUS ONCE
Status: COMPLETED | OUTPATIENT
Start: 2025-07-09 | End: 2025-07-09

## 2025-07-09 RX ADMIN — HEPARIN 100 UNITS: 100 SYRINGE at 17:54

## 2025-07-09 RX ADMIN — HYDROMORPHONE HYDROCHLORIDE 1 MG: 1 INJECTION, SOLUTION INTRAMUSCULAR; INTRAVENOUS; SUBCUTANEOUS at 16:18

## 2025-07-09 RX ADMIN — SODIUM CHLORIDE, SODIUM LACTATE, POTASSIUM CHLORIDE, AND CALCIUM CHLORIDE 1000 ML: .6; .31; .03; .02 INJECTION, SOLUTION INTRAVENOUS at 15:41

## 2025-07-09 RX ADMIN — HYDROMORPHONE HYDROCHLORIDE 1 MG: 1 INJECTION, SOLUTION INTRAMUSCULAR; INTRAVENOUS; SUBCUTANEOUS at 15:41

## 2025-07-09 RX ADMIN — HYDROMORPHONE HYDROCHLORIDE 1 MG: 1 INJECTION, SOLUTION INTRAMUSCULAR; INTRAVENOUS; SUBCUTANEOUS at 16:57

## 2025-07-09 RX ADMIN — ONDANSETRON 8 MG: 2 INJECTION INTRAMUSCULAR; INTRAVENOUS at 15:41

## 2025-07-09 RX ADMIN — SODIUM CHLORIDE, SODIUM LACTATE, POTASSIUM CHLORIDE, AND CALCIUM CHLORIDE: .6; .31; .03; .02 INJECTION, SOLUTION INTRAVENOUS at 17:14

## 2025-07-09 ASSESSMENT — ACTIVITIES OF DAILY LIVING (ADL)
ADLS_ACUITY_SCORE: 58

## 2025-07-09 NOTE — TELEPHONE ENCOUNTER
Pt calling to check status of infusion wait list and if openings at other sites. Pain is worsening and she does not want to go to the ED. Writer informed there are currently no openings at [facility] [place] other sites, will monitor for openings and call her back if anything opens up.Pt voiced understanding.     1304 call from pt, who states she is in clinic and if there are any afternoon openings, she is here and willing. Otherwise, she will try again tomorrow to get on the wait list.   1303 additional message sent to infusion team to check availability.

## 2025-07-09 NOTE — ED TRIAGE NOTES
Triage Assessment (Adult)       Row Name 07/09/25 1500          Triage Assessment    Airway WDL WDL        Respiratory WDL    Respiratory WDL WDL        Skin Circulation/Temperature WDL    Skin Circulation/Temperature WDL WDL        Cardiac WDL    Cardiac WDL WDL        Peripheral/Neurovascular WDL    Peripheral Neurovascular WDL WDL        Cognitive/Neuro/Behavioral WDL    Cognitive/Neuro/Behavioral WDL WDL

## 2025-07-09 NOTE — TELEPHONE ENCOUNTER
Oncology Nurse Triage - Sickle Cell Pain Crisis:    Situation: Jennifer  calling about Sickle Cell Pain Crisis, requesting to be added on for IV fluids and pain medicine    Background:     Patient's last infusion was 07/07/25  Last clinic visit date:07/01/25  Does patient have active treatment plan? Yes    Assessment of Symptoms:  Onset/Duration of symptoms: ongoing    Is it typical sickle cell pain? Yes  Location: generalized      Any radiation of pain, numbness, tingling, weakness, warmth, swelling, discoloration of arms or legs?No    Fever? No  (if yes max temperature recorded in last 24 hours):      Chest Pain Present: No    Shortness of breath: No    Other home therapies tried: Not asked     Last home medication taken and when: 10pm    Any Refills Needed?: No    Does patient have transportation & length of time to get to clinic: Has transportation to the clinic, but needs transportation home      Recommendations:     Added to wait list     If you do not hear from the infusion center by 2pm then you will not be able to get in for an infusion today. If symptoms worsen while waiting for call back, and/or you experience fever, chills, SOB, chest pain, cough, n/v, dizziness, numbness, swelling, discoloration of extremities, then seek emergency evaluation in Emergency Department.     Please note, if you are late for your appt, you risk losing your infusion appt as it may delay another patient's infusion who arrived on time.

## 2025-07-09 NOTE — ED PROVIDER NOTES
"    [facility] ([facility])    7/09/25      ED PROVIDER NOTE     History     Chief Complaint   Patient presents with    Sickle Cell Pain Crisis     Pt states she tried to go to the infusion clinic today but there were bot able to get her in.  States she had to come to  the ED today because she in severe pain.      The history is provided by the patient and medical records.     Jennifer Cervantes is a 25 year old female with history of sickle cell disease complicated by CVA in 2015 who presents to the emergency department with sickle cell pain flare.  She tried to get into the infusion clinic today and checked on a wait list but there were no openings available.  She has not been able to get her sickle cell pain under control and so presents for evaluation.  She states she has\" all over the body\".  She does deny chest pain or shortness of breath.  She denies any vomiting or diarrhea, fever or chills, respiratory illness symptoms, denies any chance that she could be pregnant.  Patient states she would not be in the ED if she had been able to access the infusion clinic and is somewhat frustrated.    Past Medical History  Past Medical History:   Diagnosis Date    Anxiety     Bleeding disorder     Blood clotting disorder     Cerebral infarction (H) 2015    Cognitive developmental delay     low IQ. 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 side (H)     right hand contractures    Iron overload due to repeated red blood cell transfusions     Migraines     Multiple subsegmental pulmonary emboli without acute cor pulmonale (H) 02/01/2021    Oppositional defiant behavior     Presence of intrauterine contraceptive device 2/18/2020    Superficial venous thrombosis of arm, right 03/25/2021    Uncomplicated asthma      Past Surgical History:   Procedure Laterality Date    AS INSERT " TUNNELED CV 2 CATH W/O PORT/PUMP      CHOLECYSTECTOMY      CV RIGHT HEART CATH MEASUREMENTS RECORDED N/A 11/18/2021    Procedure: Right Heart Cath;  Surgeon: Jackson Stauffer MD;  Location: [facility]    INSERT PORT VASCULAR ACCESS Left 4/21/2021    Procedure: INSERTION, VASCULAR ACCESS PORT (NOT SURE ON SIDE UNTIL REMOVAL);  Surgeon: Rajan More MD;  Location: [facility] OR    IR CHEST PORT PLACEMENT > 5 YRS OF AGE  4/21/2021    IR CVC NON TUNNEL LINE REMOVAL  5/6/2021    IR CVC NON TUNNEL PLACEMENT > 5 YRS  04/07/2020    IR CVC NON TUNNEL PLACEMENT > 5 YRS  4/30/2021    IR CVC NON TUNNEL PLACEMENT > 5 YRS  9/7/2022    IR PORT REMOVAL LEFT  4/21/2021    REMOVE PORT VASCULAR ACCESS Left 4/21/2021    Procedure: REMOVAL, VASCULAR ACCESS PORT LEFT;  Surgeon: Rajan More MD;  Location: [facility] OR    REPAIR TENDON ELBOW Right 10/02/2019    Procedure: Right Elbow Flexor Lengthening, Flexor Pronator Slide Of Wrist and Finger, Thumb Adductor Lengthening;  Surgeon: Anai Franco MD;  Location: [facility] OR    TONSILLECTOMY Bilateral 10/02/2019    Procedure: Bilateral Tonsillectomy;  Surgeon: Farhana Guy MD;  Location: [facility] OR    ZZC BREAST REDUCTION (INCLUDES LIPO) TIER 3 Bilateral 04/23/2019     albuterol (PROVENTIL) (2.5 MG/3ML) 0.083% neb solution  aspirin (ASA) 81 MG chewable tablet  azelastine (ASTELIN) 0.1 % nasal spray  budesonide-formoterol (SYMBICORT/BREYNA) 160-4.5 MCG/ACT Inhaler  deferasirox (JADENU) 360 MG tablet  Deferiprone, Twice Daily, 1000 MG TABS  diclofenac (VOLTAREN) 1 % topical gel  diphenhydrAMINE (BENADRYL) 50 MG capsule  EPINEPHrine (ANY BX GENERIC EQUIV) 0.3 MG/0.3ML injection 2-pack  Fluticasone-Umeclidin-Vilant (TRELEGY ELLIPTA) 100-62.5-25 MCG/ACT oral inhaler  hydroxyurea (HYDREA) 500 MG capsule  ibuprofen (ADVIL/MOTRIN) 800 MG tablet  ketotifen fumarate 0.035% 0.035 % SOLN ophthalmic solution  methocarbamol (ROBAXIN) 750 MG tablet  methylPREDNISolone (MEDROL) 32 MG  "tablet  naloxone (NARCAN) 4 MG/0.1ML nasal spray  naproxen (NAPROSYN) 500 MG tablet  oxyCODONE IR (ROXICODONE) 10 MG tablet  pantoprazole (PROTONIX) 40 MG EC tablet  sertraline (ZOLOFT) 50 MG tablet      Allergies   Allergen Reactions    Contrast Dye Angioedema     Hives and breathing issues    Fish-Derived Products Hives    Seafood Hives    Adhesive Tape Hives     Primipore dressing causes hives    Gadolinium     Iodinated Contrast Media      Family History  Family History   Problem Relation Age of Onset    Sickle Cell Trait Mother     Hypertension Mother     Asthma Mother     Sickle Cell Trait Father     Glaucoma No family hx of     Macular Degeneration No family hx of     Diabetes No family hx of     Gout No family hx of      Social History   Social History     Tobacco Use    Smoking status: Never     Passive exposure: Never    Smokeless tobacco: Never   Substance Use Topics    Alcohol use: Not Currently     Alcohol/week: 0.0 standard drinks of alcohol    Drug use: Never      A medically appropriate review of systems was performed with pertinent positives and negatives noted in the HPI, and all other systems negative.    Physical Exam   BP: 121/87  Pulse: 101  Temp: 98.4  F (36.9  C)  Resp: 18  Height: 162.6 cm (5' 4\")  Weight: 70.3 kg (155 lb)  SpO2: 99 %  Physical Exam  Vitals and nursing note reviewed.   Constitutional:       General: She is not in acute distress.     Appearance: Normal appearance. She is not diaphoretic.   HENT:      Head: Atraumatic.      Mouth/Throat:      Mouth: Mucous membranes are moist.   Eyes:      General: No scleral icterus.     Conjunctiva/sclera: Conjunctivae normal.   Cardiovascular:      Rate and Rhythm: Normal rate.      Heart sounds: Normal heart sounds.   Pulmonary:      Effort: No respiratory distress.      Breath sounds: Normal breath sounds.   Abdominal:      General: Abdomen is flat.   Musculoskeletal:         General: Tenderness present.      Cervical back: Neck supple. "      Comments: She has generalized muscle tenderness, but no rash, no joint effusions, no cellulitis, no signs of DVT, otherwise unremarkable exam   Skin:     General: Skin is warm.      Findings: No rash.   Neurological:      Mental Status: She is alert.           ED Course, Procedures, & Data      Procedures                Results for orders placed or performed during the hospital encounter of 07/09/25   Comprehensive metabolic panel   Result Value Ref Range    Sodium 139 135 - 145 mmol/L    Potassium 4.0 3.4 - 5.3 mmol/L    Carbon Dioxide (CO2) 19 (L) 22 - 29 mmol/L    Anion Gap 16 (H) 7 - 15 mmol/L    Urea Nitrogen 7.7 6.0 - 20.0 mg/dL    Creatinine 0.51 0.51 - 0.95 mg/dL    GFR Estimate >90 >60 mL/min/1.73m2    Calcium 8.8 8.8 - 10.4 mg/dL    Chloride 104 98 - 107 mmol/L    Glucose 90 70 - 99 mg/dL    Alkaline Phosphatase 59 40 - 150 U/L    AST 59 (H) 0 - 45 U/L    ALT 36 0 - 50 U/L    Protein Total 8.0 6.4 - 8.3 g/dL    Albumin 4.7 3.5 - 5.2 g/dL    Bilirubin Total 2.8 (H) <=1.2 mg/dL   CBC with platelets and differential   Result Value Ref Range    WBC Count 11.6 (H) 4.0 - 11.0 10e3/uL    RBC Count 2.44 (L) 3.80 - 5.20 10e6/uL    Hemoglobin 7.7 (L) 11.7 - 15.7 g/dL    Hematocrit 21.3 (L) 35.0 - 47.0 %    MCV 87 78 - 100 fL    MCH 31.6 26.5 - 33.0 pg    MCHC 36.2 31.5 - 36.5 g/dL    RDW 22.5 (H) 10.0 - 15.0 %    Platelet Count 431 150 - 450 10e3/uL    % Neutrophils 69 %    % Lymphocytes 19 %    % Monocytes 7 %    % Eosinophils 3 %    % Basophils 2 %    % Immature Granulocytes 0 %    NRBCs per 100 WBC 1 (H) <1 /100    Absolute Neutrophils 8.0 1.6 - 8.3 10e3/uL    Absolute Lymphocytes 2.2 0.8 - 5.3 10e3/uL    Absolute Monocytes 0.8 0.0 - 1.3 10e3/uL    Absolute Eosinophils 0.3 0.0 - 0.7 10e3/uL    Absolute Basophils 0.2 0.0 - 0.2 10e3/uL    Absolute Immature Granulocytes 0.1 <=0.4 10e3/uL    Absolute NRBCs 0.1 10e3/uL     Medications   lactated ringers BOLUS 1,000 mL (1,000 mLs Intravenous $New Bag 7/9/25 1329)    lactated ringers infusion (has no administration in time range)   ondansetron (ZOFRAN) 2 MG/ML injection (  Canceled Entry 7/9/25 1604)   ondansetron (ZOFRAN) injection 8 mg (8 mg Intravenous $Given 7/9/25 1541)   HYDROmorphone (DILAUDID) injection 1 mg (1 mg Intravenous $Given 7/9/25 1541)   HYDROmorphone (DILAUDID) injection 1 mg (1 mg Intravenous $Given 7/9/25 1618)          Critical care was not performed.     Medical Decision Making  The patient's presentation was of moderate complexity (a chronic illness mild to moderate exacerbation, progression, or side effect of treatment).    The patient's evaluation involved:  review of external note(s) from 3+ sources (review of most recent ED visits on 7/4/2025, 6/27/2025, and 6/24/2025)  review of 3+ test result(s) ordered prior to this encounter (review of multiple prior hemoglobin results multiple prior retic count)  ordering and/or review of 3+ test(s) in this encounter (see separate area of note for details)    The patient's management necessitated moderate risk (prescription drug management including medications given in the ED) and high risk (a parenteral controlled substance).    Assessment & Plan    A 25-year-old female with a history of sickle cell disease, presenting now with sickle cell pain.  She is known to the ED due to previous similar episodes, and has a care plan in epic which has been successful in treating these episodes in the past.  Today she does not have chest pain, fever, abnormal vital signs, hypoxia, or other infectious symptoms.  No red flags for life-threatening etiology of her sickle cell pain today.  Her care plan was enacted as is outlined in [facility].  Her labs are not significantly changed from previous.  Appears to be improving with the enactment of her care plan.  Will sign out to Dr. Young at shift change, assuming she continues to improve could be discharged follow-up with heme-onc.  If her pain is uncontrolled or she  deteriorates would need admission.      I have reviewed the nursing notes. I have reviewed the findings, diagnosis, plan and need for follow up with the patient.    New Prescriptions    No medications on file       Final diagnoses:   Sickle cell pain crisis (H)     I, Bambi Tejeda, am serving as a trained medical scribe to document services personally performed by Ifeanyi Valdez MD based on the provider's statements to me on July 9, 2025.  This document has been checked and approved by the attending provider.    I, Ifeanyi Valdez MD, was physically present and have reviewed and verified the accuracy of this note documented by Bambi Tejeda, medical scribe.      Ifeanyi Valdez MD   [facility] EMERGENCY DEPARTMENT  7/9/2025     Ifeanyi Valdez MD  07/09/25 [id number]

## 2025-07-11 ENCOUNTER — HOSPITAL ENCOUNTER (EMERGENCY)
Facility: CLINIC | Age: 25
Discharge: HOME | End: 2025-07-11
Attending: EMERGENCY MEDICINE | Admitting: EMERGENCY MEDICINE
Payer: MEDICAID

## 2025-07-11 ENCOUNTER — APPOINTMENT (OUTPATIENT)
Dept: GENERAL RADIOLOGY | Facility: CLINIC | Age: 25
End: 2025-07-11
Attending: PHYSICIAN ASSISTANT
Payer: MEDICAID

## 2025-07-11 VITALS
BODY MASS INDEX: 27.59 KG/M2 | HEIGHT: 64 IN | DIASTOLIC BLOOD PRESSURE: 67 MMHG | RESPIRATION RATE: 14 BRPM | WEIGHT: 161.6 LBS | SYSTOLIC BLOOD PRESSURE: 115 MMHG | TEMPERATURE: 98.2 F | HEART RATE: 67 BPM | OXYGEN SATURATION: 98 %

## 2025-07-11 DIAGNOSIS — R07.89 CHEST WALL PAIN: ICD-10-CM

## 2025-07-11 LAB
ALBUMIN SERPL BCG-MCNC: 4.3 G/DL (ref 3.5–5.2)
ALP SERPL-CCNC: 59 U/L (ref 40–150)
ALT SERPL W P-5'-P-CCNC: 36 U/L (ref 0–50)
ANION GAP SERPL CALCULATED.3IONS-SCNC: 13 MMOL/L (ref 7–15)
AST SERPL W P-5'-P-CCNC: 67 U/L (ref 0–45)
BASOPHILS # BLD AUTO: 0.2 10E3/UL (ref 0–0.2)
BASOPHILS NFR BLD AUTO: 2 %
BILIRUB SERPL-MCNC: 2.8 MG/DL
BUN SERPL-MCNC: 8.1 MG/DL (ref 6–20)
CALCIUM SERPL-MCNC: 8.5 MG/DL (ref 8.8–10.4)
CHLORIDE SERPL-SCNC: 104 MMOL/L (ref 98–107)
CREAT SERPL-MCNC: 0.5 MG/DL (ref 0.51–0.95)
EGFRCR SERPLBLD CKD-EPI 2021: >90 ML/MIN/1.73M2
EOSINOPHIL # BLD AUTO: 0.3 10E3/UL (ref 0–0.7)
EOSINOPHIL NFR BLD AUTO: 4 %
ERYTHROCYTE [DISTWIDTH] IN BLOOD BY AUTOMATED COUNT: 23.2 % (ref 10–15)
GLUCOSE SERPL-MCNC: 93 MG/DL (ref 70–99)
HCG INTACT+B SERPL-ACNC: <1 MIU/ML
HCO3 SERPL-SCNC: 21 MMOL/L (ref 22–29)
HCT VFR BLD AUTO: 19.2 % (ref 35–47)
HGB BLD-MCNC: 7.1 G/DL (ref 11.7–15.7)
IMM GRANULOCYTES # BLD: 0.1 10E3/UL
IMM GRANULOCYTES NFR BLD: 1 %
LIPASE SERPL-CCNC: 32 U/L (ref 13–60)
LYMPHOCYTES # BLD AUTO: 1.4 10E3/UL (ref 0.8–5.3)
LYMPHOCYTES NFR BLD AUTO: 16 %
MCH RBC QN AUTO: 31.4 PG (ref 26.5–33)
MCHC RBC AUTO-ENTMCNC: 37 G/DL (ref 31.5–36.5)
MCV RBC AUTO: 85 FL (ref 78–100)
MONOCYTES # BLD AUTO: 0.9 10E3/UL (ref 0–1.3)
MONOCYTES NFR BLD AUTO: 10 %
NEUTROPHILS # BLD AUTO: 6.1 10E3/UL (ref 1.6–8.3)
NEUTROPHILS NFR BLD AUTO: 68 %
NRBC # BLD AUTO: 0.1 10E3/UL
NRBC BLD AUTO-RTO: 2 /100
PLATELET # BLD AUTO: 438 10E3/UL (ref 150–450)
POTASSIUM SERPL-SCNC: 3.9 MMOL/L (ref 3.4–5.3)
PROT SERPL-MCNC: 7.3 G/DL (ref 6.4–8.3)
RBC # BLD AUTO: 2.26 10E6/UL (ref 3.8–5.2)
RETICS # AUTO: 0.38 10E6/UL (ref 0.03–0.1)
RETICS/RBC NFR AUTO: 16.9 % (ref 0.5–2)
SODIUM SERPL-SCNC: 138 MMOL/L (ref 135–145)
WBC # BLD AUTO: 9 10E3/UL (ref 4–11)

## 2025-07-11 PROCEDURE — 99285 EMERGENCY DEPT VISIT HI MDM: CPT | Mod: 25 | Performed by: EMERGENCY MEDICINE

## 2025-07-11 PROCEDURE — 85045 AUTOMATED RETICULOCYTE COUNT: CPT | Performed by: PHYSICIAN ASSISTANT

## 2025-07-11 PROCEDURE — 80053 COMPREHEN METABOLIC PANEL: CPT | Performed by: PHYSICIAN ASSISTANT

## 2025-07-11 PROCEDURE — 258N000003 HC RX IP 258 OP 636: Performed by: PHYSICIAN ASSISTANT

## 2025-07-11 PROCEDURE — 250N000011 HC RX IP 250 OP 636: Performed by: PHYSICIAN ASSISTANT

## 2025-07-11 PROCEDURE — 84702 CHORIONIC GONADOTROPIN TEST: CPT | Performed by: EMERGENCY MEDICINE

## 2025-07-11 PROCEDURE — 36415 COLL VENOUS BLD VENIPUNCTURE: CPT | Performed by: PHYSICIAN ASSISTANT

## 2025-07-11 PROCEDURE — 93010 ELECTROCARDIOGRAM REPORT: CPT | Performed by: EMERGENCY MEDICINE

## 2025-07-11 PROCEDURE — 71046 X-RAY EXAM CHEST 2 VIEWS: CPT

## 2025-07-11 PROCEDURE — 250N000011 HC RX IP 250 OP 636: Performed by: EMERGENCY MEDICINE

## 2025-07-11 PROCEDURE — 96376 TX/PRO/DX INJ SAME DRUG ADON: CPT | Performed by: EMERGENCY MEDICINE

## 2025-07-11 PROCEDURE — 85014 HEMATOCRIT: CPT | Performed by: PHYSICIAN ASSISTANT

## 2025-07-11 PROCEDURE — 96374 THER/PROPH/DIAG INJ IV PUSH: CPT | Performed by: EMERGENCY MEDICINE

## 2025-07-11 PROCEDURE — 250N000013 HC RX MED GY IP 250 OP 250 PS 637: Performed by: PHYSICIAN ASSISTANT

## 2025-07-11 PROCEDURE — 93005 ELECTROCARDIOGRAM TRACING: CPT | Performed by: EMERGENCY MEDICINE

## 2025-07-11 PROCEDURE — 83690 ASSAY OF LIPASE: CPT | Performed by: PHYSICIAN ASSISTANT

## 2025-07-11 PROCEDURE — 96375 TX/PRO/DX INJ NEW DRUG ADDON: CPT | Performed by: EMERGENCY MEDICINE

## 2025-07-11 PROCEDURE — 96361 HYDRATE IV INFUSION ADD-ON: CPT | Performed by: EMERGENCY MEDICINE

## 2025-07-11 PROCEDURE — 99284 EMERGENCY DEPT VISIT MOD MDM: CPT | Mod: GC | Performed by: EMERGENCY MEDICINE

## 2025-07-11 RX ORDER — KETOROLAC TROMETHAMINE 15 MG/ML
15 INJECTION, SOLUTION INTRAMUSCULAR; INTRAVENOUS ONCE
Status: COMPLETED | OUTPATIENT
Start: 2025-07-11 | End: 2025-07-11

## 2025-07-11 RX ORDER — LIDOCAINE 4 G/G
1 PATCH TOPICAL ONCE
Status: DISCONTINUED | OUTPATIENT
Start: 2025-07-11 | End: 2025-07-11 | Stop reason: HOSPADM

## 2025-07-11 RX ORDER — SODIUM CHLORIDE, SODIUM LACTATE, POTASSIUM CHLORIDE, CALCIUM CHLORIDE 600; 310; 30; 20 MG/100ML; MG/100ML; MG/100ML; MG/100ML
INJECTION, SOLUTION INTRAVENOUS CONTINUOUS
Status: DISCONTINUED | OUTPATIENT
Start: 2025-07-11 | End: 2025-07-11 | Stop reason: HOSPADM

## 2025-07-11 RX ORDER — HEPARIN SODIUM (PORCINE) LOCK FLUSH IV SOLN 100 UNIT/ML 100 UNIT/ML
5-10 SOLUTION INTRAVENOUS
Status: DISCONTINUED | OUTPATIENT
Start: 2025-07-11 | End: 2025-07-11 | Stop reason: HOSPADM

## 2025-07-11 RX ADMIN — KETOROLAC TROMETHAMINE 15 MG: 15 INJECTION, SOLUTION INTRAMUSCULAR; INTRAVENOUS at 11:16

## 2025-07-11 RX ADMIN — HYDROMORPHONE HYDROCHLORIDE 1 MG: 1 INJECTION, SOLUTION INTRAMUSCULAR; INTRAVENOUS; SUBCUTANEOUS at 11:16

## 2025-07-11 RX ADMIN — LIDOCAINE 4% 1 PATCH: 40 PATCH TOPICAL at 13:01

## 2025-07-11 RX ADMIN — SODIUM CHLORIDE, SODIUM LACTATE, POTASSIUM CHLORIDE, AND CALCIUM CHLORIDE: .6; .31; .03; .02 INJECTION, SOLUTION INTRAVENOUS at 14:18

## 2025-07-11 RX ADMIN — SODIUM CHLORIDE, SODIUM LACTATE, POTASSIUM CHLORIDE, AND CALCIUM CHLORIDE 1000 ML: .6; .31; .03; .02 INJECTION, SOLUTION INTRAVENOUS at 11:15

## 2025-07-11 RX ADMIN — HYDROMORPHONE HYDROCHLORIDE 1 MG: 1 INJECTION, SOLUTION INTRAMUSCULAR; INTRAVENOUS; SUBCUTANEOUS at 13:19

## 2025-07-11 RX ADMIN — HYDROMORPHONE HYDROCHLORIDE 1 MG: 1 INJECTION, SOLUTION INTRAMUSCULAR; INTRAVENOUS; SUBCUTANEOUS at 12:16

## 2025-07-11 RX ADMIN — HEPARIN 5 ML: 100 SYRINGE at 15:53

## 2025-07-11 ASSESSMENT — ACTIVITIES OF DAILY LIVING (ADL)
ADLS_ACUITY_SCORE: 58
ADLS_ACUITY_SCORE: 60

## 2025-07-11 NOTE — ED TRIAGE NOTES
"Pt here for right sided upper rib/under breast pain.    Pain started yesterday morning.  Pt took some naproxen w/ minimal relief.  Pain has then radiated to the back- right side.  Denies CP, SOB, dizziness, nausea.    Pain currently is between \"mild to moderate.\"  Denies breast pain- \"pain is just under the breast.\"    Denies trauma to the site.      "

## 2025-07-11 NOTE — ED NOTES
Pt tolerating ambulation on pulse ox. Unable to maintain sats above 90% ORA, placed on 2LPM. Provider made aware.

## 2025-07-11 NOTE — ED PROVIDER NOTES
[facility] EMERGENCY DEPARTMENT ([facility])    7/11/25      ED PROVIDER NOTE     History     Chief Complaint   Patient presents with    Rib Pain     HPI  Jenniefr Cervantes is a 25 year old female who presents to the Emergency Department for evaluation of right-sided rib pain. Comorbidities include asthma, sickle cell disease, migraine headache, history of multiple subsegmental pulmonary emboli (not anticoagulated), and cerebral infarction. She is status post cholecystectomy. A very pleasant 25-year-old female presents to the emergency department with concerns of right-sided rib pain. The patient states that yesterday morning, while lying in bed, she woke up with right-sided pain in her right upper quadrant and right lower ribs. She denies any traumatic injury. She reports that the pain radiates to her back as well. She denies associated nausea or vomiting. She denies alcohol use. She states that she has been taking naproxen without relief. She reports that the pain has persisted until this morning, when she called the infusion center but was unable to be seen. They advised her to seek further evaluation in the emergency department. Upon arrival, the patient is well-appearing, with no acute distress. She is currently lying on her right side, on her phone, speaking to her mother, and appears to be in no acute distress. She is speaking in complete sentences. She denies any chest pain, shortness of breath, nausea, vomiting, headache, fevers, or chills. She is currently on hydroxyurea and states she has been compliant with this medication.    Past Medical History  Past Medical History:   Diagnosis Date    Anxiety     Bleeding disorder     Blood clotting disorder     Cerebral infarction (H) 2015    Cognitive developmental delay     low IQ. 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 side (H)      right hand contractures    Iron overload due to repeated red blood cell transfusions     Migraines     Multiple subsegmental pulmonary emboli without acute cor pulmonale (H) 02/01/2021    Oppositional defiant behavior     Presence of intrauterine contraceptive device 2/18/2020    Superficial venous thrombosis of arm, right 03/25/2021    Uncomplicated asthma      Past Surgical History:   Procedure Laterality Date    AS INSERT TUNNELED CV 2 CATH W/O PORT/PUMP      CHOLECYSTECTOMY      CV RIGHT HEART CATH MEASUREMENTS RECORDED N/A 11/18/2021    Procedure: Right Heart Cath;  Surgeon: Jackson Stauffer MD;  Location:  HEART CARDIAC CATH LAB    INSERT PORT VASCULAR ACCESS Left 4/21/2021    Procedure: INSERTION, VASCULAR ACCESS PORT (NOT SURE ON SIDE UNTIL REMOVAL);  Surgeon: Rajan More MD;  Location: [facility] OR    IR CHEST PORT PLACEMENT > 5 YRS OF AGE  4/21/2021    IR CVC NON TUNNEL LINE REMOVAL  5/6/2021    IR CVC NON TUNNEL PLACEMENT > 5 YRS  04/07/2020    IR CVC NON TUNNEL PLACEMENT > 5 YRS  4/30/2021    IR CVC NON TUNNEL PLACEMENT > 5 YRS  9/7/2022    IR PORT REMOVAL LEFT  4/21/2021    REMOVE PORT VASCULAR ACCESS Left 4/21/2021    Procedure: REMOVAL, VASCULAR ACCESS PORT LEFT;  Surgeon: Rajan More MD;  Location: [facility] OR    REPAIR TENDON ELBOW Right 10/02/2019    Procedure: Right Elbow Flexor Lengthening, Flexor Pronator Slide Of Wrist and Finger, Thumb Adductor Lengthening;  Surgeon: Anai Franco MD;  Location: [facility] OR    TONSILLECTOMY Bilateral 10/02/2019    Procedure: Bilateral Tonsillectomy;  Surgeon: Farhana Guy MD;  Location: [facility] OR    ZZC BREAST REDUCTION (INCLUDES LIPO) TIER 3 Bilateral 04/23/2019     albuterol (PROVENTIL) (2.5 MG/3ML) 0.083% neb solution  aspirin (ASA) 81 MG chewable tablet  azelastine (ASTELIN) 0.1 % nasal spray  budesonide-formoterol (SYMBICORT/BREYNA) 160-4.5 MCG/ACT Inhaler  deferasirox (JADENU) 360 MG tablet  Deferiprone, Twice Daily, 1000 MG  "TABS  diclofenac (VOLTAREN) 1 % topical gel  diphenhydrAMINE (BENADRYL) 50 MG capsule  EPINEPHrine (ANY BX GENERIC EQUIV) 0.3 MG/0.3ML injection 2-pack  Fluticasone-Umeclidin-Vilant (TRELEGY ELLIPTA) 100-62.5-25 MCG/ACT oral inhaler  hydroxyurea (HYDREA) 500 MG capsule  ibuprofen (ADVIL/MOTRIN) 800 MG tablet  ketotifen fumarate 0.035% 0.035 % SOLN ophthalmic solution  methocarbamol (ROBAXIN) 750 MG tablet  methylPREDNISolone (MEDROL) 32 MG tablet  naloxone (NARCAN) 4 MG/0.1ML nasal spray  naproxen (NAPROSYN) 500 MG tablet  oxyCODONE IR (ROXICODONE) 10 MG tablet  pantoprazole (PROTONIX) 40 MG EC tablet  sertraline (ZOLOFT) 50 MG tablet      Allergies   Allergen Reactions    Contrast Dye Angioedema     Hives and breathing issues    Fish-Derived Products Hives    Seafood Hives    Adhesive Tape Hives     Primipore dressing causes hives    Gadolinium     Iodinated Contrast Media      Family History  Family History   Problem Relation Age of Onset    Sickle Cell Trait Mother     Hypertension Mother     Asthma Mother     Sickle Cell Trait Father     Glaucoma No family hx of     Macular Degeneration No family hx of     Diabetes No family hx of     Gout No family hx of      Social History   Social History     Tobacco Use    Smoking status: Never     Passive exposure: Never    Smokeless tobacco: Never   Substance Use Topics    Alcohol use: Not Currently     Alcohol/week: 0.0 standard drinks of alcohol    Drug use: Never      Past medical history, past surgical history, medications, allergies, family history, and social history were reviewed with the patient. No additional pertinent items.   A medically appropriate review of systems was performed with pertinent positives and negatives noted in the HPI, and all other systems negative.    Physical Exam   BP: 125/70  Pulse: 89  Temp: 98.2  F (36.8  C)  Resp: 16  Height: 162.6 cm (5' 4\")  Weight: 73.3 kg (161 lb 9.6 oz)  SpO2: 95 %  Physical Exam  Constitutional:       General: She " is not in acute distress.     Appearance: Normal appearance. She is not diaphoretic.   HENT:      Head: Atraumatic.      Mouth/Throat:      Mouth: Mucous membranes are moist.   Eyes:      General: No scleral icterus.     Conjunctiva/sclera: Conjunctivae normal.   Cardiovascular:      Rate and Rhythm: Normal rate.      Heart sounds: Normal heart sounds.      Comments: No JVD or lower extremity peripheral edema.  No lower extremity peripheral edema or unilateral leg swelling.  No signs of DVT.  Pulmonary:      Effort: No respiratory distress.      Breath sounds: Normal breath sounds.      Comments: Breath sounds present bilaterally, clear to auscultation. No wheezing.  Chest:          Comments: Tenderness in the area depicted above without any overlying skin changes.  No erythema, warmth fluctuance or induration noted.  Abdominal:      General: Abdomen is flat.      Comments: Right upper quadrant tenderness without any rebound, guarding or rigidity on examination.  Epigastric tenderness present.  No right lower quadrant or left lower quadrant tenderness.  Abdomen otherwise soft, nondistended.  No peritoneal findings   Musculoskeletal:      Cervical back: Neck supple.   Skin:     General: Skin is warm.      Findings: No rash.   Neurological:      Mental Status: She is alert.      Comments: EOMs intact, PERRLA.  Cranial nerves II through XII appear grossly intact.           ED Course, Procedures, & Data       Procedures                    Results for orders placed or performed during the hospital encounter of 07/11/25   Chest XR,  PA & LAT    Impression    IMPRESSION: Left IJ Port-A-Cath. Mild elevation the right hemidiaphragm again noted. Lungs are clear. Heart and pulmonary vascularity are normal. No signs of acute disease. Prior cholecystectomy.   Reticulocyte count   Result Value Ref Range    % Reticulocyte 16.9 (H) 0.5 - 2.0 %    Absolute Reticulocyte 0.381 (H) 0.025 - 0.095 10e6/uL   Comprehensive metabolic panel    Result Value Ref Range    Sodium 138 135 - 145 mmol/L    Potassium 3.9 3.4 - 5.3 mmol/L    Carbon Dioxide (CO2) 21 (L) 22 - 29 mmol/L    Anion Gap 13 7 - 15 mmol/L    Urea Nitrogen 8.1 6.0 - 20.0 mg/dL    Creatinine 0.50 (L) 0.51 - 0.95 mg/dL    GFR Estimate >90 >60 mL/min/1.73m2    Calcium 8.5 (L) 8.8 - 10.4 mg/dL    Chloride 104 98 - 107 mmol/L    Glucose 93 70 - 99 mg/dL    Alkaline Phosphatase 59 40 - 150 U/L    AST 67 (H) 0 - 45 U/L    ALT 36 0 - 50 U/L    Protein Total 7.3 6.4 - 8.3 g/dL    Albumin 4.3 3.5 - 5.2 g/dL    Bilirubin Total 2.8 (H) <=1.2 mg/dL   Result Value Ref Range    Lipase 32 13 - 60 U/L   HCG quantitative pregnancy   Result Value Ref Range    hCG Quantitative <1 <5 mIU/mL   CBC with platelets and differential   Result Value Ref Range    WBC Count 9.0 4.0 - 11.0 10e3/uL    RBC Count 2.26 (L) 3.80 - 5.20 10e6/uL    Hemoglobin 7.1 (L) 11.7 - 15.7 g/dL    Hematocrit 19.2 (L) 35.0 - 47.0 %    MCV 85 78 - 100 fL    MCH 31.4 26.5 - 33.0 pg    MCHC 37.0 (H) 31.5 - 36.5 g/dL    RDW 23.2 (H) 10.0 - 15.0 %    Platelet Count 438 150 - 450 10e3/uL    % Neutrophils 68 %    % Lymphocytes 16 %    % Monocytes 10 %    % Eosinophils 4 %    % Basophils 2 %    % Immature Granulocytes 1 %    NRBCs per 100 WBC 2 (H) <1 /100    Absolute Neutrophils 6.1 1.6 - 8.3 10e3/uL    Absolute Lymphocytes 1.4 0.8 - 5.3 10e3/uL    Absolute Monocytes 0.9 0.0 - 1.3 10e3/uL    Absolute Eosinophils 0.3 0.0 - 0.7 10e3/uL    Absolute Basophils 0.2 0.0 - 0.2 10e3/uL    Absolute Immature Granulocytes 0.1 <=0.4 10e3/uL    Absolute NRBCs 0.1 10e3/uL   EKG 12 lead   Result Value Ref Range    Systolic Blood Pressure  mmHg    Diastolic Blood Pressure  mmHg    Ventricular Rate 76 BPM    Atrial Rate 76 BPM    TX Interval 158 ms    QRS Duration 82 ms     ms    QTc 461 ms    P Axis 77 degrees    R AXIS 56 degrees    T Axis 36 degrees    Interpretation ECG Sinus rhythm  Normal ECG        Medications   Lidocaine (LIDOCARE) 4 % Patch 1  patch (1 patch Transdermal $Patch/Med Applied 7/11/25 1301)   lactated ringers infusion (has no administration in time range)   ketorolac (TORADOL) injection 15 mg (15 mg Intravenous $Given 7/11/25 1116)   lactated ringers BOLUS 1,000 mL (0 mLs Intravenous Stopped 7/11/25 1249)   HYDROmorphone (DILAUDID) injection 1 mg (1 mg Intravenous $Given 7/11/25 1319)          Critical care was not performed.     Medical Decision Making  The patient's presentation was of moderate complexity (a chronic illness mild to moderate exacerbation, progression, or side effect of treatment).    The patient's evaluation involved:  an assessment requiring an independent historian (see separate area of note for details)  review of external note(s) from 3+ sources (see separate area of note for details)  review of 1 test result(s) ordered prior to this encounter (see separate area of note for details)  ordering and/or review of 1 test(s) in this encounter (see separate area of note for details)    The patient's management necessitated high risk (a parenteral controlled substance).    Assessment & Plan    In brief, a very pleasant 25-year-old female presents to the emergency department for evaluation of right lower chest wall pain and right upper quadrant pain. Differential diagnoses include sickle cell pain crisis, pancreatitis; less likely ectopic pregnancy, pulmonary embolism, acute coronary syndrome, hepatitis; acute chest syndrome and more likely rib fracture and other causes.  On exam, the patient is well-appearing, with no acute distress. Physical exam findings are as documented above. The patient is status post-cholecystectomy; however, she is endorsing right upper quadrant and gastric pain. She does have some tenderness on examination. She denies any alcohol use.  Potentially at risk for pancreatitis; therefore, a lipase will be ordered.  She does have a history of PE and not on any anticoagulation.  Wells criteria 1.5 point, 1.3%  chance of PE and in ED population.  Not necessarily PERC negative however given that she has no chest pain, shortness of breath, tachycardic, hypoxic, hypotensive or endorses pleuritic chest pain or findings on exam of DVT, I have very low suspicion.  Rib fractures are considered; however, since the patient has no known trauma or bony tenderness, suspicion is low.  Consideration of acute coronary syndrome is noted; however, given the absence of chest pain, shortness of breath, or exertional symptoms, suspicion is low. An EKG will be ordered as a screening measure. A urinalysis will be obtained; however, suspicion for ectopic pregnancy remains low.  Considered acute chest syndrome however given the patient denies any chest pain, shortness of breath, cough and afebrile, have low suspicion.  Pain management will be administered at this time. Laboratory tests to be ordered include CMP, lipase, CBC, reticulocyte count, EKG, and pregnancy testing. Advanced imaging will be deferred at this time, pending lab results.  Please see the ED course for final disposition.      I have reviewed the nursing notes. I have reviewed the findings, diagnosis, plan and need for follow up with the patient.    New Prescriptions    No medications on file       Final diagnoses:   Chest wall pain     ED Course as of 07/11/25 1357   Fri Jul 11, 2025   1106 EKG 12 lead  EKG personally reviewed, normal sinus rhythm, no ST elevation or other signs of ischemia noted.  Normal axis deviation.  Normal NJ interval.  Normal QRS.  Back to most recent fall, no segment changes compared to most recent on file, no significant changes.   1117 WBC: 9.0  No leukocytosis.   1117 RBC Count(!): 2.26  Appears baseline routine   1118 Hemoglobin(!): 7.1  Decreased hemoglobin.   1146 Lipase: 32  Has some epigastric pain on examination.  Lipase within normal limits, no nausea, vomiting.  Low suspicion for pancreatitis   1146 Bilirubin Total(!): 2.8  He remained appears  baseline.   1147 AST(!): 67  The elevation in AST however has been elevated in the past   1147 Urea Nitrogen: 8.1  Normal BUN.   1147 GFR Estimate: >90  Normal renal function.   1147 Anion Gap: 13  Anion gap.   1147 Carbon Dioxide (CO2)(!): 21  Decreased bicarb.  Appears to be baseline.   1147 Upon reassessment, patient states her pain has slightly improved.  Now rates it a 7 out of 10 on a previous 9 out of 10.   1201 % Retic(!): 16.9  Appears to be baseline.   1244 Upon reexamination, patient appears to be doing well.  Appears to be in no acute distress.  Remains comfortable lying on the cot   1317 Contacted by nursing staff noted that patient did become hypoxic with the Dilaudid.  Per chart review, it appears the patient does have a history of toxemia with opioids.  Patient does also endorses history.  She is not currently denies any chest pain or shortness of breath.  I did ask nursing staff to relate with pulse oximetry and patient tolerated procedure well.  Will obtain chest x-ray to evaluate for acute chest syndrome however have very low suspicion.  Otherwise, patient is doing well.   1343 Chest XR,  PA & LAT  X-ray personally reviewed.  I do not appreciate any focal consolidations.  Port-A-Cath present on the left side of the chest.  No pneumothorax or pleural effusions noted    Agree with radiology impression  IMPRESSION: Left IJ Port-A-Cath. Mild elevation the right hemidiaphragm again noted. Lungs are clear. Heart and pulmonary vascularity are normal. No signs of acute disease. Prior cholecystectomy.   1348 Chest XR,  PA & LAT   1355 Upon reevaluation, patient appears to be doing better.  She states her pain has improved however she would like to wait a little bit longer as she feels a little bit lightheaded from the Dilaudid and otherwise feels comfortable to be discharged at this time.  Chest x-ray otherwise is negative.  She is afebrile, no chest pain or shortness of breath I have low suspicion for  acute chest syndrome.  No focal logic deficit on examination IV concerning for stroke.  She is anemic however this appears to be her baseline.  No indication for transfusion at this time.  Will let the patient rest for a little bit longer and she will be discharge.  She understands that if she has new or worsening symptoms, she should promptly turn back to the emergency department.  Patient discharged in stable amatory condition.      Niko Walker PA-C    [facility] EMERGENCY DEPARTMENT  7/11/2025     Niko Walker PA-C  07/11/25 1400    --    ED Attending Physician Attestation    I Dmitri Thomas MD, cared for this patient with the Advanced Practice Provider (ANDREAS). I personally provided a substantive portion of the care for this patient, including approving the care plan for the number and complexity of problems addressed and taking responsibility related to the risk of complications and/or morbidity or mortality of patient management. Please see the ANDREAS's documentation for full details.    Dmitri Thomas MD  Emergency Medicine         Dmitri Thomas MD  07/26/25 8290

## 2025-07-11 NOTE — DISCHARGE INSTRUCTIONS
You were examined and treated today at the [facility] (ED) on an emergency basis. This visit is not a substitute for comprehensive and ongoing medical care. In most cases, you must let your primary physician evaluate you again. Call your doctor today to advise them of your ED visit and arrange for outpatient follow up. Tell your doctor about any new or lasting problems. After you leave the ED today, please follow the instructions provided to you. Return to the Emergency Department for new, or worsening such as:      Call 911 anytime you think you may need emergency care. For example, call if:    You have symptoms of a severe problem from sickle cell.     You have symptoms of a stroke. These may include:  Sudden numbness, tingling, weakness, or loss of movement in your face, arm, or leg, especially on only one side of your body.  Sudden vision changes.  Sudden trouble speaking.  Sudden confusion or trouble understanding simple statements.  Sudden problems with walking or balance.  A sudden, severe headache that is different from past headaches.     You are in severe pain.     You have symptoms of a heart attack. These may include:  Chest pain or pressure, or a strange feeling in the chest.  Sweating.  Shortness of breath.  Nausea or vomiting.  Pain, pressure, or a strange feeling in the back, neck, jaw, or upper belly or in one or both shoulders or arms.  Lightheadedness or sudden weakness.  A fast or irregular heartbeat.  After you call 911, the  may tell you to chew 1 adult-strength or 2 to 4 low-dose aspirin. Wait for an ambulance. Do not try to drive yourself.   Call your doctor now or seek immediate medical care if:    You have a fever.     Home Management of Pain   Continue prescribed oral analgesics as directed. Nonsteroidal anti-inflammatory drugs (NSAIDs) such as ibuprofen may be used if not contraindicated, and short courses of oral opioids may be necessary for moderate to  severe pain. Use the lowest effective dose for the shortest duration needed.   Maintain adequate oral hydration. Drink plenty of fluids unless otherwise instructed due to another medical condition. There is no evidence that a specific type or amount of fluid is superior, but dehydration should be avoided.[   Use adjunctive non-pharmacologic measures for pain relief, such as warmth (heating pads), massage, distraction, and relaxation techniques   Avoid known triggers of vaso-occlusive episodes, including cold exposure, overexertion, and dehydration.  Monitoring and Warning Signs     Monitor for signs of complications. Seek immediate medical attention if any of the following occur:   Fever >= 38.5 C (101.3 F), as infection can rapidly become life-threatening in sickle cell disease.   Chest pain, shortness of breath, new cough, or hypoxia, which may indicate acute chest syndrome.[1-2][4]   Severe headache, confusion, weakness, difficulty speaking, or seizure, which may indicate stroke.[2][4]   Abdominal pain with distension, pallor, or sudden weakness, which may indicate splenic sequestration or aplastic crisis.[2][4]   Persistent vomiting, inability to tolerate oral intake, or signs of dehydration.[1-2]   Any pain that is not controlled with home medications or is different from usual pain crises.[1-2]  Follow-Up and Preventive Care   Schedule follow-up with the hematology clinic or primary care provider within 1 week, or sooner if instructed.   Continue all disease-modifying therapies (e.g., hydroxyurea) and prophylactic medications as previously prescribed.[2-3]   Remain up to date with immunizations, including those recommended for asplenic individuals.[2]   Continue daily folic acid supplementation if prescribed.[2]   For children, ensure ongoing penicillin prophylaxis if under age 5, and adhere to recommended surveillance (e.g., annual transcranial Doppler for ages 2-16 with sickle cell anemia).[3-4]   Maintain a  medical alert card or emergency information form with baseline health information, as recommended by the American Academy of Pediatrics.[4]  Additional Instructions   Use incentive spirometry if previously instructed, especially after hospitalization for pain crisis, to reduce the risk of acute chest syndrome.[3]   Monitor for opioid side effects, including constipation, pruritus, and sedation. Use bowel regimens as needed to prevent constipation.[2]   Contact the care team with any questions or concerns regarding medications or symptoms.  Emergency Contact   In case of emergency, call 911 or go to the nearest emergency department with experience in sickle cell disease management.  These instructions are based on current recommendations from the American Society of Hematology, the American Academy of Pediatrics, and recent evidence reviews

## 2025-07-11 NOTE — ED NOTES
Pt desatting ORA, lowest seen at 84% on monitor. Placed on 2LPM O2. Currently maintaining in high 90s on O2 weaned to 1LPM. Denies CP/SOB. MD made aware.

## 2025-07-12 LAB
ATRIAL RATE - MUSE: 76 BPM
DIASTOLIC BLOOD PRESSURE - MUSE: NORMAL MMHG
INTERPRETATION ECG - MUSE: NORMAL
P AXIS - MUSE: 77 DEGREES
PR INTERVAL - MUSE: 158 MS
QRS DURATION - MUSE: 82 MS
QT - MUSE: 410 MS
QTC - MUSE: 461 MS
R AXIS - MUSE: 56 DEGREES
SYSTOLIC BLOOD PRESSURE - MUSE: NORMAL MMHG
T AXIS - MUSE: 36 DEGREES
VENTRICULAR RATE- MUSE: 76 BPM

## 2025-07-14 ENCOUNTER — NURSE TRIAGE (OUTPATIENT)
Dept: ONCOLOGY | Facility: CLINIC | Age: 25
End: 2025-07-14
Payer: MEDICAID

## 2025-07-14 ENCOUNTER — HOSPITAL ENCOUNTER (EMERGENCY)
Facility: CLINIC | Age: 25
Discharge: HOME | End: 2025-07-14
Attending: EMERGENCY MEDICINE
Payer: MEDICAID

## 2025-07-14 ENCOUNTER — HOSPITAL ENCOUNTER (OUTPATIENT)
Dept: MRI IMAGING | Facility: CLINIC | Age: 25
Discharge: HOME | End: 2025-07-14
Attending: REGISTERED NURSE | Admitting: REGISTERED NURSE
Payer: MEDICAID

## 2025-07-14 VITALS
RESPIRATION RATE: 16 BRPM | HEART RATE: 89 BPM | SYSTOLIC BLOOD PRESSURE: 126 MMHG | DIASTOLIC BLOOD PRESSURE: 78 MMHG | OXYGEN SATURATION: 96 % | TEMPERATURE: 98.2 F

## 2025-07-14 DIAGNOSIS — E83.111 IRON OVERLOAD DUE TO REPEATED RED BLOOD CELL TRANSFUSIONS: ICD-10-CM

## 2025-07-14 DIAGNOSIS — D57.00 SICKLE CELL DISEASE WITH CRISIS (H): ICD-10-CM

## 2025-07-14 DIAGNOSIS — D57.00 SICKLE CELL PAIN CRISIS (H): ICD-10-CM

## 2025-07-14 LAB
ANION GAP SERPL CALCULATED.3IONS-SCNC: 14 MMOL/L (ref 7–15)
BASOPHILS # BLD AUTO: 0.2 10E3/UL (ref 0–0.2)
BASOPHILS NFR BLD AUTO: 2 %
BUN SERPL-MCNC: 6 MG/DL (ref 6–20)
CALCIUM SERPL-MCNC: 9.1 MG/DL (ref 8.8–10.4)
CHLORIDE SERPL-SCNC: 104 MMOL/L (ref 98–107)
CREAT SERPL-MCNC: 0.51 MG/DL (ref 0.51–0.95)
EGFRCR SERPLBLD CKD-EPI 2021: >90 ML/MIN/1.73M2
EOSINOPHIL # BLD AUTO: 0.4 10E3/UL (ref 0–0.7)
EOSINOPHIL NFR BLD AUTO: 4 %
ERYTHROCYTE [DISTWIDTH] IN BLOOD BY AUTOMATED COUNT: 25.7 % (ref 10–15)
GLUCOSE SERPL-MCNC: 98 MG/DL (ref 70–99)
HCO3 SERPL-SCNC: 20 MMOL/L (ref 22–29)
HCT VFR BLD AUTO: 21 % (ref 35–47)
HGB BLD-MCNC: 7.6 G/DL (ref 11.7–15.7)
IMM GRANULOCYTES # BLD: 0.1 10E3/UL
IMM GRANULOCYTES NFR BLD: 1 %
LYMPHOCYTES # BLD AUTO: 2.3 10E3/UL (ref 0.8–5.3)
LYMPHOCYTES NFR BLD AUTO: 21 %
MCH RBC QN AUTO: 31.7 PG (ref 26.5–33)
MCHC RBC AUTO-ENTMCNC: 36.2 G/DL (ref 31.5–36.5)
MCV RBC AUTO: 88 FL (ref 78–100)
MONOCYTES # BLD AUTO: 0.9 10E3/UL (ref 0–1.3)
MONOCYTES NFR BLD AUTO: 8 %
NEUTROPHILS # BLD AUTO: 7.1 10E3/UL (ref 1.6–8.3)
NEUTROPHILS NFR BLD AUTO: 65 %
NRBC # BLD AUTO: 0.5 10E3/UL
NRBC BLD AUTO-RTO: 5 /100
PLATELET # BLD AUTO: 490 10E3/UL (ref 150–450)
POTASSIUM SERPL-SCNC: 3.8 MMOL/L (ref 3.4–5.3)
RBC # BLD AUTO: 2.4 10E6/UL (ref 3.8–5.2)
RETICS # AUTO: 0.5 10E6/UL (ref 0.03–0.1)
RETICS/RBC NFR AUTO: 24.1 % (ref 0.5–2)
SODIUM SERPL-SCNC: 138 MMOL/L (ref 135–145)
WBC # BLD AUTO: 10.9 10E3/UL (ref 4–11)

## 2025-07-14 PROCEDURE — 36415 COLL VENOUS BLD VENIPUNCTURE: CPT | Performed by: EMERGENCY MEDICINE

## 2025-07-14 PROCEDURE — 96375 TX/PRO/DX INJ NEW DRUG ADDON: CPT | Performed by: EMERGENCY MEDICINE

## 2025-07-14 PROCEDURE — 80048 BASIC METABOLIC PNL TOTAL CA: CPT | Performed by: EMERGENCY MEDICINE

## 2025-07-14 PROCEDURE — 258N000003 HC RX IP 258 OP 636: Performed by: EMERGENCY MEDICINE

## 2025-07-14 PROCEDURE — 99284 EMERGENCY DEPT VISIT MOD MDM: CPT | Mod: 25 | Performed by: EMERGENCY MEDICINE

## 2025-07-14 PROCEDURE — 250N000011 HC RX IP 250 OP 636: Performed by: EMERGENCY MEDICINE

## 2025-07-14 PROCEDURE — 96376 TX/PRO/DX INJ SAME DRUG ADON: CPT | Performed by: EMERGENCY MEDICINE

## 2025-07-14 PROCEDURE — 96374 THER/PROPH/DIAG INJ IV PUSH: CPT | Performed by: EMERGENCY MEDICINE

## 2025-07-14 PROCEDURE — 85045 AUTOMATED RETICULOCYTE COUNT: CPT | Performed by: EMERGENCY MEDICINE

## 2025-07-14 PROCEDURE — 96361 HYDRATE IV INFUSION ADD-ON: CPT | Performed by: EMERGENCY MEDICINE

## 2025-07-14 PROCEDURE — 99284 EMERGENCY DEPT VISIT MOD MDM: CPT | Performed by: EMERGENCY MEDICINE

## 2025-07-14 PROCEDURE — 85025 COMPLETE CBC W/AUTO DIFF WBC: CPT | Performed by: EMERGENCY MEDICINE

## 2025-07-14 PROCEDURE — 74181 MRI ABDOMEN W/O CONTRAST: CPT

## 2025-07-14 RX ORDER — KETOROLAC TROMETHAMINE 15 MG/ML
15 INJECTION, SOLUTION INTRAMUSCULAR; INTRAVENOUS ONCE
Status: COMPLETED | OUTPATIENT
Start: 2025-07-14 | End: 2025-07-14

## 2025-07-14 RX ORDER — OXYCODONE HYDROCHLORIDE 10 MG/1
10 TABLET ORAL EVERY 6 HOURS PRN
Qty: 12 TABLET | Refills: 0 | Status: SHIPPED | OUTPATIENT
Start: 2025-07-14

## 2025-07-14 RX ORDER — HEPARIN SODIUM (PORCINE) LOCK FLUSH IV SOLN 100 UNIT/ML 100 UNIT/ML
5-10 SOLUTION INTRAVENOUS
Status: DISCONTINUED | OUTPATIENT
Start: 2025-07-14 | End: 2025-07-14 | Stop reason: HOSPADM

## 2025-07-14 RX ADMIN — HYDROMORPHONE HYDROCHLORIDE 1 MG: 1 INJECTION, SOLUTION INTRAMUSCULAR; INTRAVENOUS; SUBCUTANEOUS at 18:40

## 2025-07-14 RX ADMIN — SODIUM CHLORIDE, SODIUM LACTATE, POTASSIUM CHLORIDE, AND CALCIUM CHLORIDE 1000 ML: .6; .31; .03; .02 INJECTION, SOLUTION INTRAVENOUS at 17:09

## 2025-07-14 RX ADMIN — HYDROMORPHONE HYDROCHLORIDE 1 MG: 1 INJECTION, SOLUTION INTRAMUSCULAR; INTRAVENOUS; SUBCUTANEOUS at 17:08

## 2025-07-14 RX ADMIN — HEPARIN 5 ML: 100 SYRINGE at 19:20

## 2025-07-14 RX ADMIN — KETOROLAC TROMETHAMINE 15 MG: 15 INJECTION, SOLUTION INTRAMUSCULAR; INTRAVENOUS at 17:08

## 2025-07-14 ASSESSMENT — ACTIVITIES OF DAILY LIVING (ADL)
ADLS_ACUITY_SCORE: 60

## 2025-07-14 NOTE — ED PROVIDER NOTES
[facility] EMERGENCY DEPARTMENT ([facility])    7/14/25      ED PROVIDER NOTE   Laura CID  History     Chief Complaint   Patient presents with    Dehydration     HPI  Jennifer Cervantes is a 25 year old female with history of sickle cell disease who presents to the emergency department with concerns for dehydration and generalized weakness as well as sickle cell pain.  She called infusion center at 7 AM today to be added on for IV fluids and pain medications but they did not have any openings and was referred to the ED for further assistance.  She has been in and out of the heat all day, going to appointments, picking up her medications and she has been feeling worse and more generally weak.  Here she continues to feel generally weak with joint pain.  She came here hoping to make sure her body is ok. No fevers.  Typically gets 1 bag of fluids as well as pain medications.     Past Medical History  Past Medical History:   Diagnosis Date    Anxiety     Bleeding disorder     Blood clotting disorder     Cerebral infarction (H) 2015    Cognitive developmental delay     low IQ. 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 side (H)     right hand contractures    Iron overload due to repeated red blood cell transfusions     Migraines     Multiple subsegmental pulmonary emboli without acute cor pulmonale (H) 02/01/2021    Oppositional defiant behavior     Presence of intrauterine contraceptive device 2/18/2020    Superficial venous thrombosis of arm, right 03/25/2021    Uncomplicated asthma      Past Surgical History:   Procedure Laterality Date    AS INSERT TUNNELED CV 2 CATH W/O PORT/PUMP      CHOLECYSTECTOMY      CV RIGHT HEART CATH MEASUREMENTS RECORDED N/A 11/18/2021    Procedure: Right Heart Cath;  Surgeon: Jackson Stauffer MD;  Location:  HEART CARDIAC CATH LAB    INSERT PORT VASCULAR  ACCESS Left 4/21/2021    Procedure: INSERTION, VASCULAR ACCESS PORT (NOT SURE ON SIDE UNTIL REMOVAL);  Surgeon: Rajan More MD;  Location: [facility] OR    IR CHEST PORT PLACEMENT > 5 YRS OF AGE  4/21/2021    IR CVC NON TUNNEL LINE REMOVAL  5/6/2021    IR CVC NON TUNNEL PLACEMENT > 5 YRS  04/07/2020    IR CVC NON TUNNEL PLACEMENT > 5 YRS  4/30/2021    IR CVC NON TUNNEL PLACEMENT > 5 YRS  9/7/2022    IR PORT REMOVAL LEFT  4/21/2021    REMOVE PORT VASCULAR ACCESS Left 4/21/2021    Procedure: REMOVAL, VASCULAR ACCESS PORT LEFT;  Surgeon: Rajan More MD;  Location: [facility] OR    REPAIR TENDON ELBOW Right 10/02/2019    Procedure: Right Elbow Flexor Lengthening, Flexor Pronator Slide Of Wrist and Finger, Thumb Adductor Lengthening;  Surgeon: Anai Franco MD;  Location: [facility] OR    TONSILLECTOMY Bilateral 10/02/2019    Procedure: Bilateral Tonsillectomy;  Surgeon: Farhana Guy MD;  Location: [facility] OR    ZZC BREAST REDUCTION (INCLUDES LIPO) TIER 3 Bilateral 04/23/2019     naproxen (NAPROSYN) 500 MG tablet  oxyCODONE IR (ROXICODONE) 10 MG tablet  albuterol (PROVENTIL) (2.5 MG/3ML) 0.083% neb solution  aspirin (ASA) 81 MG chewable tablet  azelastine (ASTELIN) 0.1 % nasal spray  budesonide-formoterol (SYMBICORT/BREYNA) 160-4.5 MCG/ACT Inhaler  deferasirox (JADENU) 360 MG tablet  Deferiprone, Twice Daily, 1000 MG TABS  diclofenac (VOLTAREN) 1 % topical gel  diphenhydrAMINE (BENADRYL) 50 MG capsule  EPINEPHrine (ANY BX GENERIC EQUIV) 0.3 MG/0.3ML injection 2-pack  Fluticasone-Umeclidin-Vilant (TRELEGY ELLIPTA) 100-62.5-25 MCG/ACT oral inhaler  hydroxyurea (HYDREA) 500 MG capsule  ibuprofen (ADVIL/MOTRIN) 800 MG tablet  ketotifen fumarate 0.035% 0.035 % SOLN ophthalmic solution  methocarbamol (ROBAXIN) 750 MG tablet  methylPREDNISolone (MEDROL) 32 MG tablet  naloxone (NARCAN) 4 MG/0.1ML nasal spray  pantoprazole (PROTONIX) 40 MG EC tablet  sertraline (ZOLOFT) 50 MG tablet      Allergies   Allergen Reactions     Contrast Dye Angioedema     Hives and breathing issues    Fish-Derived Products Hives    Seafood Hives    Adhesive Tape Hives     Primipore dressing causes hives    Gadolinium     Iodinated Contrast Media      Family History  Family History   Problem Relation Age of Onset    Sickle Cell Trait Mother     Hypertension Mother     Asthma Mother     Sickle Cell Trait Father     Glaucoma No family hx of     Macular Degeneration No family hx of     Diabetes No family hx of     Gout No family hx of      Social History   Social History     Tobacco Use    Smoking status: Never     Passive exposure: Never    Smokeless tobacco: Never   Substance Use Topics    Alcohol use: Not Currently     Alcohol/week: 0.0 standard drinks of alcohol    Drug use: Never      A medically appropriate review of systems was performed with pertinent positives and negatives noted in the HPI, and all other systems negative.    Physical Exam   BP: 114/65  Pulse: 89  Temp: 98.2  F (36.8  C)  Resp: 16  SpO2: 96 %    Physical Exam  Constitutional:       General: She is not in acute distress.     Appearance: She is not diaphoretic.   HENT:      Head: Normocephalic and atraumatic.      Right Ear: External ear normal.      Left Ear: External ear normal.      Nose: Nose normal.   Eyes:      Extraocular Movements: Extraocular movements intact.      Conjunctiva/sclera: Conjunctivae normal.   Cardiovascular:      Rate and Rhythm: Normal rate and regular rhythm.      Heart sounds: Normal heart sounds.   Pulmonary:      Effort: Pulmonary effort is normal. No respiratory distress.      Breath sounds: Normal breath sounds.   Abdominal:      General: There is no distension.      Palpations: Abdomen is soft.      Tenderness: There is no abdominal tenderness.   Musculoskeletal:         General: No swelling or deformity.      Cervical back: Normal range of motion and neck supple.   Skin:     General: Skin is warm and dry.   Neurological:      Mental Status: Mental status  is at baseline.      Comments: Alert, oriented   Psychiatric:         Mood and Affect: Mood normal.         Behavior: Behavior normal.           ED Course, Procedures, & Data      Procedures       Results for orders placed or performed during the hospital encounter of 07/14/25   Basic metabolic panel     Status: Abnormal   Result Value Ref Range    Sodium 138 135 - 145 mmol/L    Potassium 3.8 3.4 - 5.3 mmol/L    Chloride 104 98 - 107 mmol/L    Carbon Dioxide (CO2) 20 (L) 22 - 29 mmol/L    Anion Gap 14 7 - 15 mmol/L    Urea Nitrogen 6.0 6.0 - 20.0 mg/dL    Creatinine 0.51 0.51 - 0.95 mg/dL    GFR Estimate >90 >60 mL/min/1.73m2    Calcium 9.1 8.8 - 10.4 mg/dL    Glucose 98 70 - 99 mg/dL   Reticulocyte count     Status: Abnormal   Result Value Ref Range    % Reticulocyte 24.1 (H) 0.5 - 2.0 %    Absolute Reticulocyte 0.501 (H) 0.025 - 0.095 10e6/uL   CBC with platelets and differential     Status: Abnormal   Result Value Ref Range    WBC Count 10.9 4.0 - 11.0 10e3/uL    RBC Count 2.40 (L) 3.80 - 5.20 10e6/uL    Hemoglobin 7.6 (L) 11.7 - 15.7 g/dL    Hematocrit 21.0 (L) 35.0 - 47.0 %    MCV 88 78 - 100 fL    MCH 31.7 26.5 - 33.0 pg    MCHC 36.2 31.5 - 36.5 g/dL    RDW 25.7 (H) 10.0 - 15.0 %    Platelet Count 490 (H) 150 - 450 10e3/uL    % Neutrophils 65 %    % Lymphocytes 21 %    % Monocytes 8 %    % Eosinophils 4 %    % Basophils 2 %    % Immature Granulocytes 1 %    NRBCs per 100 WBC 5 (H) <1 /100    Absolute Neutrophils 7.1 1.6 - 8.3 10e3/uL    Absolute Lymphocytes 2.3 0.8 - 5.3 10e3/uL    Absolute Monocytes 0.9 0.0 - 1.3 10e3/uL    Absolute Eosinophils 0.4 0.0 - 0.7 10e3/uL    Absolute Basophils 0.2 0.0 - 0.2 10e3/uL    Absolute Immature Granulocytes 0.1 <=0.4 10e3/uL    Absolute NRBCs 0.5 10e3/uL   CBC with platelets differential     Status: Abnormal    Narrative    The following orders were created for panel order CBC with platelets differential.  Procedure                               Abnormality         Status                      ---------                               -----------         ------                     CBC with platelets and ...[[id number]]  Abnormal            Final result                 Please view results for these tests on the individual orders.   Results for orders placed or performed during the hospital encounter of 07/14/25   MR Gutierrez     Status: None    Narrative    MRI FOR LIVER IRON CONCENTRATION (FERRISCAN)    CLINICAL HISTORY:    Item overload due to repeated red blood cell transfusions    TECHNIQUE:    Sequential non-contrast spin-echo MRI imaging obtained of the liver  with TR 2500 msec and progressively longer Miley up to 18 msec.    FINDINGS:    Average liver iron concentration is 25.6 mg/g dry tissue (normal =  0.17 - 1.8)  Average liver iron concentration is 459 mmol/kg dry tissue (normal = 3  - 33)    Average liver iron concentration greater than 15 mg/g dry tissue is  associated with greatly increased risk of cardiac disease and early  death  in patients with transfusional iron overload      Impression    IMPRESSION: Significantly elevated average liver iron concentration,  as detailed above     PIPPA LUNDBERG MD         SYSTEM ID:  [id number]            Results for orders placed or performed during the hospital encounter of 07/14/25   Basic metabolic panel   Result Value Ref Range    Sodium 138 135 - 145 mmol/L    Potassium 3.8 3.4 - 5.3 mmol/L    Chloride 104 98 - 107 mmol/L    Carbon Dioxide (CO2) 20 (L) 22 - 29 mmol/L    Anion Gap 14 7 - 15 mmol/L    Urea Nitrogen 6.0 6.0 - 20.0 mg/dL    Creatinine 0.51 0.51 - 0.95 mg/dL    GFR Estimate >90 >60 mL/min/1.73m2    Calcium 9.1 8.8 - 10.4 mg/dL    Glucose 98 70 - 99 mg/dL   Reticulocyte count   Result Value Ref Range    % Reticulocyte 24.1 (H) 0.5 - 2.0 %    Absolute Reticulocyte 0.501 (H) 0.025 - 0.095 10e6/uL   CBC with platelets and differential   Result Value Ref Range    WBC Count 10.9 4.0 - 11.0 10e3/uL    RBC Count 2.40 (L) 3.80 -  5.20 10e6/uL    Hemoglobin 7.6 (L) 11.7 - 15.7 g/dL    Hematocrit 21.0 (L) 35.0 - 47.0 %    MCV 88 78 - 100 fL    MCH 31.7 26.5 - 33.0 pg    MCHC 36.2 31.5 - 36.5 g/dL    RDW 25.7 (H) 10.0 - 15.0 %    Platelet Count 490 (H) 150 - 450 10e3/uL    % Neutrophils 65 %    % Lymphocytes 21 %    % Monocytes 8 %    % Eosinophils 4 %    % Basophils 2 %    % Immature Granulocytes 1 %    NRBCs per 100 WBC 5 (H) <1 /100    Absolute Neutrophils 7.1 1.6 - 8.3 10e3/uL    Absolute Lymphocytes 2.3 0.8 - 5.3 10e3/uL    Absolute Monocytes 0.9 0.0 - 1.3 10e3/uL    Absolute Eosinophils 0.4 0.0 - 0.7 10e3/uL    Absolute Basophils 0.2 0.0 - 0.2 10e3/uL    Absolute Immature Granulocytes 0.1 <=0.4 10e3/uL    Absolute NRBCs 0.5 10e3/uL     Medications   HYDROmorphone (DILAUDID) injection 1 mg (has no administration in time range)   lactated ringers BOLUS 1,000 mL (1,000 mLs Intravenous $New Bag 7/14/25 1709)   ketorolac (TORADOL) injection 15 mg (15 mg Intravenous $Given 7/14/25 1708)   HYDROmorphone (DILAUDID) injection 1 mg (1 mg Intravenous $Given 7/14/25 1708)          Medical Decision Making  The patient's presentation is strongly suggestive of moderate complexity (a chronic illness mild to moderate exacerbation, progression, or side effect of treatment).    The patient's evaluation involved:  review of external note(s) from 3+ sources (Most recent H&P in addition to clinic and ED note)  review of 2 test result(s) ordered prior to this encounter (Most recent BMP and CBC)    The patient's management involved high risk (a parenteral controlled substance).      Assessment & Plan    Jennifer Cervantes is a 25 year old female with history of sickle cell disease who presents to the emergency department with concerns for dehydration and generalized weakness as well as sickle cell pain.  Labs were at baseline.  She was given fluids as well as pain and nausea control per her care plan.  She is feeling better at this point.  We will discharge her  follow-up with primary care    I have reviewed the nursing notes. I have reviewed the findings, diagnosis, plan and need for follow up with the patient.    New Prescriptions    No medications on file       Final diagnoses:   Sickle cell pain crisis (H)     I, Bambi Tejeda, am serving as a trained medical scribe to document services personally performed by Mykel Luz DO based on the provider's statements to me on July 14, 2025.  This document has been checked and approved by the attending provider.    I, Mykel Luz DO, was physically present and have reviewed and verified the accuracy of this note documented by Bambi Tejeda, medical scribe.      Mykel Luz DO   [facility] EMERGENCY DEPARTMENT  7/14/2025     Mykel Luz DO  07/14/25 1835

## 2025-07-14 NOTE — ED TRIAGE NOTES
Pt is concerned that she is dehydrated, having some weakness. Pt called the infusion clinic to get fluids but they stated they are too full and that she should go to the ER.

## 2025-07-14 NOTE — CONFIDENTIAL NOTE
Oncology Nurse Triage - Sickle Cell Pain Crisis:    Situation: Jennifer  calling about Sickle Cell Pain Crisis, requesting to be added on for IV fluids and pain medicine    Background:     Patient's last infusion was 7/11  Last clinic visit date:7/1  Does patient have active treatment plan? Yes    Assessment of Symptoms:  Onset/Duration of symptoms: 1 day    Is it typical sickle cell pain? Yes  Location: Back  Character: Sharp  Intensity: severe    Any radiation of pain, numbness, tingling, weakness, warmth, swelling, discoloration of arms or legs?No    Fever? No  (if yes max temperature recorded in last 24 hours):      Chest Pain Present: No    Shortness of breath: No    Other home therapies tried: WARM BATH     Last home medication taken and when: Yesterday    Any Refills Needed?: Yes    Does patient have transportation & length of time to get to clinic: Yes      Recommendations:     IVF/Pain meds    If you do not hear from the infusion center by 2pm then you will not be able to get in for an infusion today. If symptoms worsen while waiting for call back, and/or you experience fever, chills, SOB, chest pain, cough, n/v, dizziness, numbness, swelling, discoloration of extremities, then seek emergency evaluation in Emergency Department.     Please note, if you are late for your appt, you risk losing your infusion appt as it may delay another patient's infusion who arrived on time.

## 2025-07-14 NOTE — TELEPHONE ENCOUNTER
Jennifer calling checking on infusion request for today?    As of 1215 No infusion appts available, pt will try to stay out of ED and will try again later. Cut off time for transfusion same day is 2:00pm so this writer informed Jennifer if something does open up before 2pm triage will call Jennifer.

## 2025-07-15 NOTE — ED NOTES
Pts port de-accessed. Discharge vitals taken. AVS handed to the pt. Transportation set up for pt.

## 2025-07-16 ENCOUNTER — NURSE TRIAGE (OUTPATIENT)
Dept: ONCOLOGY | Facility: CLINIC | Age: 25
End: 2025-07-16
Payer: MEDICAID

## 2025-07-16 ENCOUNTER — INFUSION THERAPY VISIT (OUTPATIENT)
Dept: INFUSION THERAPY | Facility: CLINIC | Age: 25
End: 2025-07-16
Attending: PEDIATRICS
Payer: MEDICAID

## 2025-07-16 ENCOUNTER — PATIENT OUTREACH (OUTPATIENT)
Dept: CARE COORDINATION | Facility: CLINIC | Age: 25
End: 2025-07-16
Payer: MEDICAID

## 2025-07-16 VITALS
SYSTOLIC BLOOD PRESSURE: 111 MMHG | DIASTOLIC BLOOD PRESSURE: 71 MMHG | OXYGEN SATURATION: 92 % | TEMPERATURE: 98 F | RESPIRATION RATE: 14 BRPM | HEART RATE: 71 BPM

## 2025-07-16 DIAGNOSIS — G81.10 SPASTIC HEMIPLEGIA, UNSPECIFIED ETIOLOGY, UNSPECIFIED LATERALITY (H): ICD-10-CM

## 2025-07-16 DIAGNOSIS — S69.92XA WRIST INJURY, LEFT, INITIAL ENCOUNTER: Primary | ICD-10-CM

## 2025-07-16 DIAGNOSIS — D57.00 SICKLE CELL PAIN CRISIS (H): Primary | ICD-10-CM

## 2025-07-16 PROCEDURE — 96361 HYDRATE IV INFUSION ADD-ON: CPT

## 2025-07-16 PROCEDURE — 258N000003 HC RX IP 258 OP 636: Performed by: PEDIATRICS

## 2025-07-16 PROCEDURE — 96374 THER/PROPH/DIAG INJ IV PUSH: CPT

## 2025-07-16 PROCEDURE — 250N000011 HC RX IP 250 OP 636: Performed by: PEDIATRICS

## 2025-07-16 PROCEDURE — 96375 TX/PRO/DX INJ NEW DRUG ADDON: CPT

## 2025-07-16 PROCEDURE — 250N000013 HC RX MED GY IP 250 OP 250 PS 637: Performed by: PEDIATRICS

## 2025-07-16 PROCEDURE — 96376 TX/PRO/DX INJ SAME DRUG ADON: CPT

## 2025-07-16 RX ORDER — ONDANSETRON 2 MG/ML
8 INJECTION INTRAMUSCULAR; INTRAVENOUS EVERY 6 HOURS PRN
Status: DISCONTINUED | OUTPATIENT
Start: 2025-07-16 | End: 2025-07-16 | Stop reason: HOSPADM

## 2025-07-16 RX ORDER — KETOROLAC TROMETHAMINE 30 MG/ML
30 INJECTION, SOLUTION INTRAMUSCULAR; INTRAVENOUS ONCE
Start: 2025-08-01 | End: 2025-08-01

## 2025-07-16 RX ORDER — ONDANSETRON 2 MG/ML
8 INJECTION INTRAMUSCULAR; INTRAVENOUS EVERY 6 HOURS PRN
Start: 2025-08-01

## 2025-07-16 RX ORDER — DIPHENHYDRAMINE HCL 25 MG
50 CAPSULE ORAL
Status: COMPLETED | OUTPATIENT
Start: 2025-07-16 | End: 2025-07-16

## 2025-07-16 RX ORDER — KETOROLAC TROMETHAMINE 30 MG/ML
30 INJECTION, SOLUTION INTRAMUSCULAR; INTRAVENOUS ONCE
Status: COMPLETED | OUTPATIENT
Start: 2025-07-16 | End: 2025-07-16

## 2025-07-16 RX ORDER — ONDANSETRON 4 MG/1
8 TABLET, FILM COATED ORAL
Start: 2025-08-01

## 2025-07-16 RX ORDER — HEPARIN SODIUM (PORCINE) LOCK FLUSH IV SOLN 100 UNIT/ML 100 UNIT/ML
5 SOLUTION INTRAVENOUS
Status: DISCONTINUED | OUTPATIENT
Start: 2025-07-16 | End: 2025-07-16 | Stop reason: HOSPADM

## 2025-07-16 RX ORDER — HEPARIN SODIUM (PORCINE) LOCK FLUSH IV SOLN 100 UNIT/ML 100 UNIT/ML
5 SOLUTION INTRAVENOUS
OUTPATIENT
Start: 2025-08-01

## 2025-07-16 RX ORDER — DIPHENHYDRAMINE HCL 25 MG
50 CAPSULE ORAL
Start: 2025-08-01

## 2025-07-16 RX ORDER — HEPARIN SODIUM,PORCINE 10 UNIT/ML
5 VIAL (ML) INTRAVENOUS
OUTPATIENT
Start: 2025-08-01

## 2025-07-16 RX ADMIN — SODIUM CHLORIDE, SODIUM LACTATE, POTASSIUM CHLORIDE, AND CALCIUM CHLORIDE 1000 ML: .6; .31; .03; .02 INJECTION, SOLUTION INTRAVENOUS at 09:20

## 2025-07-16 RX ADMIN — ONDANSETRON 8 MG: 2 INJECTION INTRAMUSCULAR; INTRAVENOUS at 09:14

## 2025-07-16 RX ADMIN — HYDROMORPHONE HYDROCHLORIDE 1 MG: 1 INJECTION, SOLUTION INTRAMUSCULAR; INTRAVENOUS; SUBCUTANEOUS at 11:23

## 2025-07-16 RX ADMIN — HYDROMORPHONE HYDROCHLORIDE 1 MG: 1 INJECTION, SOLUTION INTRAMUSCULAR; INTRAVENOUS; SUBCUTANEOUS at 10:20

## 2025-07-16 RX ADMIN — DIPHENHYDRAMINE HYDROCHLORIDE 50 MG: 25 CAPSULE ORAL at 09:10

## 2025-07-16 RX ADMIN — Medication 5 ML: at 11:26

## 2025-07-16 RX ADMIN — KETOROLAC TROMETHAMINE 30 MG: 30 INJECTION, SOLUTION INTRAMUSCULAR; INTRAVENOUS at 09:19

## 2025-07-16 RX ADMIN — HYDROMORPHONE HYDROCHLORIDE 1 MG: 1 INJECTION, SOLUTION INTRAMUSCULAR; INTRAVENOUS; SUBCUTANEOUS at 09:12

## 2025-07-16 NOTE — TELEPHONE ENCOUNTER
Oncology Nurse Triage - Sickle Cell Pain Crisis:    Situation: Jennifer  calling about Sickle Cell Pain Crisis, requesting to be added on for IV fluids and pain medicine    Background:     Patient's last infusion was 07/14/25 in ED  Last clinic visit date:07/01/25 w/Patricia Mantilla  Does patient have active treatment plan? Yes    Assessment of Symptoms:  Onset/Duration of symptoms: 1 day    Is it typical sickle cell pain? Yes  Location: left leg  Character: Dull ache  Intensity: 7/10    Any radiation of pain, numbness, tingling, weakness, warmth, swelling, discoloration of arms or legs?No    Fever? No    Chest Pain Present: No    Shortness of breath: No    Other home therapies tried: HEAT/HEATING PAD and WARM BATH     Last home medication taken and when: Naproxen 11pm    Any Refills Needed?: No    Does patient have transportation & length of time to get to clinic: Yes, if something is available before 11am. Otherwise, she will need assistance with transportation.       Recommendations:   0711 Vocera to     0718 Approved by     If you do not hear from the infusion center by 2pm then you will not be able to get in for an infusion today. If symptoms worsen while waiting for call back, and/or you experience fever, chills, SOB, chest pain, cough, n/v, dizziness, numbness, swelling, discoloration of extremities, then seek emergency evaluation in Emergency Department.

## 2025-07-16 NOTE — TELEPHONE ENCOUNTER
Jennifer calls in needs ride home after her infusion is complete.     Message sent to  to set up will call ride home.

## 2025-07-16 NOTE — PROGRESS NOTES
Ambulatory Care Management- Transportation Support  Specialty SW - [facility]     Data/Intervention:  Patient Name:    Jennifer Cervantes     /Age: 2000 (25 year old)     [facility] team member assisting Specialty SW with transportation request.      Assessment:  CHW/CTA  arrange medical appointment transportation online with [employer] in conjunction with patient's insurance. Will call ride home scheduled - reference number is [id number]    Taxi company: [employer]    Patient will need to call above taxi company at phone number: [phone] when ready for return ride home.      Plan:  Patient is aware of the transportation plan.  available to assist with any other needs.      Maritza Vick MA

## 2025-07-16 NOTE — PATIENT INSTRUCTIONS
Dear Jennifer Cervantes    Thank you for choosing [facility] ([facility] for your infusion.  The following information is a summary of our appointment as well as important reminders.      If you have any questions on your upcoming Specialty Infusion appointments, please call scheduling at [phone].  It was a pleasure taking care of you today.    Sincerely,    [facility]  [address] [place] [address]  [id number]  Phone:  [phone]

## 2025-07-16 NOTE — TELEPHONE ENCOUNTER
[facility]. Pt confirmed she is able to make appt. Message sent to [facility] to have pt added to the schedule.

## 2025-07-16 NOTE — PROGRESS NOTES
Infusion Nursing Note:  Jennifer Cervantes presents today for IV fluids + Pain medications.    Patient seen by provider today: No   present during visit today: Not Applicable.    Note: Patient identification verified by name and date of birth. Assessment completed. Vitals recorded in Doc Flowsheets. Patient was provided with education regarding medication/procedure and possible side effects. Patient verbalized understanding. No questions at this time.    Administrations This Visit       diphenhydrAMINE (BENADRYL) capsule 50 mg       Admin Date  07/16/2025 Action  $Given Dose  50 mg Route  Oral Documented By  Andrew Luna RN              HYDROmorphone (DILAUDID) injection 1 mg       Admin Date  07/16/2025 Action  $Given Dose  1 mg Route  Intravenous Documented By  Andrew Luna RN               Admin Date  07/16/2025 Action  $Given Dose  1 mg Route  Intravenous Documented By  Patricia Qureshi RN              ketorolac (TORADOL) injection 30 mg       Admin Date  07/16/2025 Action  $Given Dose  30 mg Route  Intravenous Documented By  Andrew Luna RN              lactated ringers BOLUS 1,000 mL       Admin Date  07/16/2025 Action  $New Bag Dose  1,000 mL Rate  500 mL/hr Route  Intravenous Documented By  Andrew Luna RN              ondansetron (ZOFRAN) injection 8 mg       Admin Date  07/16/2025 Action  $Given Dose  8 mg Route  Intravenous Documented By  Andrew Luna RN                   Intravenous Access:  Implanted Port accessed and de-accessed per therapy plan orders.    Treatment Conditions:    Pt received 1L IVF, Zofran, Benadryl, Toradol and Dilaudid x 3 with effective relief to pain.     Post Infusion Assessment:  Patient tolerated infusion without incident.  Blood return noted pre and post infusion.  Site patent and intact, free from redness, edema or discomfort.  No evidence of extravasations.  Access discontinued per protocol.       Discharge Plan:   Discharge  instructions reviewed with: Patient.  Patient and/or family verbalized understanding of discharge instructions and all questions answered.  AVS to patient via [employer].  Patient will return 7/18 for Onc Infusion.   Patient discharged in stable condition accompanied by: self.  Departure Mode: Ambulatory.    /76 (BP Location: Left arm, Patient Position: Sitting, Cuff Size: Adult Regular)   Pulse 90   Temp 98  F (36.7  C) (Oral)   Resp 18   SpO2 94%      Andrew Luna RN

## 2025-07-17 ENCOUNTER — VIRTUAL VISIT (OUTPATIENT)
Dept: PSYCHOLOGY | Facility: CLINIC | Age: 25
End: 2025-07-17
Payer: MEDICAID

## 2025-07-17 DIAGNOSIS — D57.1 HB-SS DISEASE WITHOUT CRISIS (H): ICD-10-CM

## 2025-07-17 DIAGNOSIS — F34.1 PERSISTENT DEPRESSIVE DISORDER: ICD-10-CM

## 2025-07-17 DIAGNOSIS — F54 PSYCHOLOGICAL AND BEHAVIORAL FACTORS ASSOCIATED WITH DISORDERS OR DISEASES CLASSIFIED ELSEWHERE: ICD-10-CM

## 2025-07-17 DIAGNOSIS — G89.4 CHRONIC PAIN ASSOCIATED WITH SIGNIFICANT PSYCHOSOCIAL DYSFUNCTION: Primary | ICD-10-CM

## 2025-07-17 PROCEDURE — 90837 PSYTX W PT 60 MINUTES: CPT | Mod: 95 | Performed by: PSYCHOLOGIST

## 2025-07-18 ENCOUNTER — APPOINTMENT (OUTPATIENT)
Dept: LAB | Facility: CLINIC | Age: 25
End: 2025-07-18
Attending: PEDIATRICS
Payer: MEDICAID

## 2025-07-21 ENCOUNTER — NURSE TRIAGE (OUTPATIENT)
Dept: ONCOLOGY | Facility: CLINIC | Age: 25
End: 2025-07-21

## 2025-07-21 ENCOUNTER — ONCOLOGY VISIT (OUTPATIENT)
Dept: ONCOLOGY | Facility: CLINIC | Age: 25
End: 2025-07-21
Attending: REGISTERED NURSE
Payer: MEDICAID

## 2025-07-21 ENCOUNTER — ANCILLARY PROCEDURE (OUTPATIENT)
Dept: GENERAL RADIOLOGY | Facility: CLINIC | Age: 25
End: 2025-07-21
Attending: PHYSICIAN ASSISTANT
Payer: MEDICAID

## 2025-07-21 ENCOUNTER — ANCILLARY PROCEDURE (OUTPATIENT)
Dept: ULTRASOUND IMAGING | Facility: CLINIC | Age: 25
End: 2025-07-21
Attending: PHYSICIAN ASSISTANT
Payer: MEDICAID

## 2025-07-21 ENCOUNTER — OFFICE VISIT (OUTPATIENT)
Dept: INTERNAL MEDICINE | Facility: CLINIC | Age: 25
End: 2025-07-21
Payer: MEDICAID

## 2025-07-21 ENCOUNTER — HOSPITAL ENCOUNTER (EMERGENCY)
Facility: CLINIC | Age: 25
Discharge: HOME | End: 2025-07-21
Attending: STUDENT IN AN ORGANIZED HEALTH CARE EDUCATION/TRAINING PROGRAM | Admitting: STUDENT IN AN ORGANIZED HEALTH CARE EDUCATION/TRAINING PROGRAM
Payer: MEDICAID

## 2025-07-21 VITALS
TEMPERATURE: 98.1 F | HEIGHT: 64 IN | OXYGEN SATURATION: 91 % | SYSTOLIC BLOOD PRESSURE: 112 MMHG | RESPIRATION RATE: 16 BRPM | BODY MASS INDEX: 26.73 KG/M2 | WEIGHT: 156.6 LBS | DIASTOLIC BLOOD PRESSURE: 79 MMHG | HEART RATE: 84 BPM

## 2025-07-21 VITALS
OXYGEN SATURATION: 97 % | HEIGHT: 64 IN | BODY MASS INDEX: 26.73 KG/M2 | DIASTOLIC BLOOD PRESSURE: 79 MMHG | TEMPERATURE: 98.1 F | RESPIRATION RATE: 16 BRPM | WEIGHT: 156.6 LBS | SYSTOLIC BLOOD PRESSURE: 112 MMHG | HEART RATE: 86 BPM

## 2025-07-21 VITALS
DIASTOLIC BLOOD PRESSURE: 62 MMHG | BODY MASS INDEX: 26.73 KG/M2 | HEART RATE: 84 BPM | OXYGEN SATURATION: 91 % | WEIGHT: 156.6 LBS | HEIGHT: 64 IN | TEMPERATURE: 98.3 F | RESPIRATION RATE: 16 BRPM | SYSTOLIC BLOOD PRESSURE: 108 MMHG

## 2025-07-21 DIAGNOSIS — D57.00 SICKLE CELL PAIN CRISIS (H): ICD-10-CM

## 2025-07-21 DIAGNOSIS — D57.00 SICKLE CELL DISEASE WITH CRISIS (H): ICD-10-CM

## 2025-07-21 DIAGNOSIS — R74.01 ELEVATED AST (SGOT): ICD-10-CM

## 2025-07-21 DIAGNOSIS — I69.351 HEMIPLEGIA AND HEMIPARESIS FOLLOWING CEREBRAL INFARCTION AFFECTING RIGHT DOMINANT SIDE (H): ICD-10-CM

## 2025-07-21 DIAGNOSIS — R10.84 ABDOMINAL PAIN, GENERALIZED: ICD-10-CM

## 2025-07-21 DIAGNOSIS — G89.29 OTHER CHRONIC PAIN: Primary | ICD-10-CM

## 2025-07-21 DIAGNOSIS — D57.00 SICKLE CELL PAIN CRISIS (H): Primary | ICD-10-CM

## 2025-07-21 LAB
ALBUMIN SERPL BCG-MCNC: 4.9 G/DL (ref 3.5–5.2)
ALP SERPL-CCNC: 65 U/L (ref 40–150)
ALT SERPL W P-5'-P-CCNC: 41 U/L (ref 0–50)
ANION GAP SERPL CALCULATED.3IONS-SCNC: 12 MMOL/L (ref 7–15)
AST SERPL W P-5'-P-CCNC: 62 U/L (ref 0–45)
BASOPHILS # BLD AUTO: 0.2 10E3/UL (ref 0–0.2)
BASOPHILS NFR BLD AUTO: 2 %
BILIRUB SERPL-MCNC: 3.7 MG/DL
BUN SERPL-MCNC: 6.5 MG/DL (ref 6–20)
CALCIUM SERPL-MCNC: 8.7 MG/DL (ref 8.8–10.4)
CHLORIDE SERPL-SCNC: 105 MMOL/L (ref 98–107)
CREAT SERPL-MCNC: 0.51 MG/DL (ref 0.51–0.95)
EGFRCR SERPLBLD CKD-EPI 2021: >90 ML/MIN/1.73M2
EOSINOPHIL # BLD AUTO: 0.3 10E3/UL (ref 0–0.7)
EOSINOPHIL NFR BLD AUTO: 3 %
ERYTHROCYTE [DISTWIDTH] IN BLOOD BY AUTOMATED COUNT: 25.5 % (ref 10–15)
GLUCOSE SERPL-MCNC: 86 MG/DL (ref 70–99)
HCO3 SERPL-SCNC: 20 MMOL/L (ref 22–29)
HCT VFR BLD AUTO: 20.6 % (ref 35–47)
HGB BLD-MCNC: 7.5 G/DL (ref 11.7–15.7)
IMM GRANULOCYTES # BLD: 0.1 10E3/UL
IMM GRANULOCYTES NFR BLD: 1 %
LYMPHOCYTES # BLD AUTO: 2.2 10E3/UL (ref 0.8–5.3)
LYMPHOCYTES NFR BLD AUTO: 20 %
MCH RBC QN AUTO: 31.4 PG (ref 26.5–33)
MCHC RBC AUTO-ENTMCNC: 36.4 G/DL (ref 31.5–36.5)
MCV RBC AUTO: 86 FL (ref 78–100)
MONOCYTES # BLD AUTO: 0.8 10E3/UL (ref 0–1.3)
MONOCYTES NFR BLD AUTO: 7 %
NEUTROPHILS # BLD AUTO: 7.5 10E3/UL (ref 1.6–8.3)
NEUTROPHILS NFR BLD AUTO: 68 %
NRBC # BLD AUTO: 0.1 10E3/UL
NRBC BLD AUTO-RTO: 1 /100
PLATELET # BLD AUTO: 433 10E3/UL (ref 150–450)
POTASSIUM SERPL-SCNC: 3.9 MMOL/L (ref 3.4–5.3)
PROT SERPL-MCNC: 8.2 G/DL (ref 6.4–8.3)
RBC # BLD AUTO: 2.39 10E6/UL (ref 3.8–5.2)
RETICS # AUTO: 0.53 10E6/UL (ref 0.03–0.1)
RETICS/RBC NFR AUTO: 22.3 % (ref 0.5–2)
SODIUM SERPL-SCNC: 137 MMOL/L (ref 135–145)
WBC # BLD AUTO: 11.1 10E3/UL (ref 4–11)

## 2025-07-21 PROCEDURE — 85045 AUTOMATED RETICULOCYTE COUNT: CPT | Performed by: STUDENT IN AN ORGANIZED HEALTH CARE EDUCATION/TRAINING PROGRAM

## 2025-07-21 PROCEDURE — 99215 OFFICE O/P EST HI 40 MIN: CPT | Performed by: INTERNAL MEDICINE

## 2025-07-21 PROCEDURE — 36415 COLL VENOUS BLD VENIPUNCTURE: CPT | Performed by: STUDENT IN AN ORGANIZED HEALTH CARE EDUCATION/TRAINING PROGRAM

## 2025-07-21 PROCEDURE — 85041 AUTOMATED RBC COUNT: CPT | Performed by: STUDENT IN AN ORGANIZED HEALTH CARE EDUCATION/TRAINING PROGRAM

## 2025-07-21 PROCEDURE — 80053 COMPREHEN METABOLIC PANEL: CPT | Performed by: STUDENT IN AN ORGANIZED HEALTH CARE EDUCATION/TRAINING PROGRAM

## 2025-07-21 PROCEDURE — 258N000003 HC RX IP 258 OP 636: Performed by: STUDENT IN AN ORGANIZED HEALTH CARE EDUCATION/TRAINING PROGRAM

## 2025-07-21 PROCEDURE — 74019 RADEX ABDOMEN 2 VIEWS: CPT | Performed by: RADIOLOGY

## 2025-07-21 PROCEDURE — 99284 EMERGENCY DEPT VISIT MOD MDM: CPT | Mod: 25 | Performed by: STUDENT IN AN ORGANIZED HEALTH CARE EDUCATION/TRAINING PROGRAM

## 2025-07-21 PROCEDURE — 96376 TX/PRO/DX INJ SAME DRUG ADON: CPT | Performed by: STUDENT IN AN ORGANIZED HEALTH CARE EDUCATION/TRAINING PROGRAM

## 2025-07-21 PROCEDURE — 250N000011 HC RX IP 250 OP 636: Performed by: STUDENT IN AN ORGANIZED HEALTH CARE EDUCATION/TRAINING PROGRAM

## 2025-07-21 PROCEDURE — 3078F DIAST BP <80 MM HG: CPT | Performed by: INTERNAL MEDICINE

## 2025-07-21 PROCEDURE — 96374 THER/PROPH/DIAG INJ IV PUSH: CPT | Performed by: STUDENT IN AN ORGANIZED HEALTH CARE EDUCATION/TRAINING PROGRAM

## 2025-07-21 PROCEDURE — 76705 ECHO EXAM OF ABDOMEN: CPT | Performed by: RADIOLOGY

## 2025-07-21 PROCEDURE — 99284 EMERGENCY DEPT VISIT MOD MDM: CPT | Mod: GC | Performed by: STUDENT IN AN ORGANIZED HEALTH CARE EDUCATION/TRAINING PROGRAM

## 2025-07-21 PROCEDURE — G0463 HOSPITAL OUTPT CLINIC VISIT: HCPCS | Performed by: PEDIATRICS

## 2025-07-21 PROCEDURE — 96375 TX/PRO/DX INJ NEW DRUG ADDON: CPT | Performed by: STUDENT IN AN ORGANIZED HEALTH CARE EDUCATION/TRAINING PROGRAM

## 2025-07-21 PROCEDURE — 3074F SYST BP LT 130 MM HG: CPT | Performed by: INTERNAL MEDICINE

## 2025-07-21 PROCEDURE — 96361 HYDRATE IV INFUSION ADD-ON: CPT | Performed by: STUDENT IN AN ORGANIZED HEALTH CARE EDUCATION/TRAINING PROGRAM

## 2025-07-21 RX ORDER — HEPARIN SODIUM (PORCINE) LOCK FLUSH IV SOLN 100 UNIT/ML 100 UNIT/ML
5 SOLUTION INTRAVENOUS ONCE
Status: COMPLETED | OUTPATIENT
Start: 2025-07-21 | End: 2025-07-21

## 2025-07-21 RX ORDER — ONDANSETRON 2 MG/ML
8 INJECTION INTRAMUSCULAR; INTRAVENOUS ONCE
Status: COMPLETED | OUTPATIENT
Start: 2025-07-21 | End: 2025-07-21

## 2025-07-21 RX ORDER — KETOROLAC TROMETHAMINE 30 MG/ML
30 INJECTION, SOLUTION INTRAMUSCULAR; INTRAVENOUS ONCE
Status: COMPLETED | OUTPATIENT
Start: 2025-07-21 | End: 2025-07-21

## 2025-07-21 RX ADMIN — HYDROMORPHONE HYDROCHLORIDE 1 MG: 1 INJECTION, SOLUTION INTRAMUSCULAR; INTRAVENOUS; SUBCUTANEOUS at 13:41

## 2025-07-21 RX ADMIN — HYDROMORPHONE HYDROCHLORIDE 1 MG: 1 INJECTION, SOLUTION INTRAMUSCULAR; INTRAVENOUS; SUBCUTANEOUS at 14:43

## 2025-07-21 RX ADMIN — HEPARIN 5 ML: 100 SYRINGE at 16:39

## 2025-07-21 RX ADMIN — ONDANSETRON 8 MG: 2 INJECTION INTRAMUSCULAR; INTRAVENOUS at 13:44

## 2025-07-21 RX ADMIN — HYDROMORPHONE HYDROCHLORIDE 1 MG: 1 INJECTION, SOLUTION INTRAMUSCULAR; INTRAVENOUS; SUBCUTANEOUS at 16:00

## 2025-07-21 RX ADMIN — KETOROLAC TROMETHAMINE 30 MG: 30 INJECTION, SOLUTION INTRAMUSCULAR at 13:43

## 2025-07-21 RX ADMIN — SODIUM CHLORIDE, SODIUM LACTATE, POTASSIUM CHLORIDE, AND CALCIUM CHLORIDE 1000 ML: .6; .31; .03; .02 INJECTION, SOLUTION INTRAVENOUS at 13:49

## 2025-07-21 SDOH — HEALTH STABILITY: PHYSICAL HEALTH: ON AVERAGE, HOW MANY DAYS PER WEEK DO YOU ENGAGE IN MODERATE TO STRENUOUS EXERCISE (LIKE A BRISK WALK)?: 2 DAYS

## 2025-07-21 ASSESSMENT — SOCIAL DETERMINANTS OF HEALTH (SDOH): HOW OFTEN DO YOU GET TOGETHER WITH FRIENDS OR RELATIVES?: PATIENT DECLINED

## 2025-07-21 ASSESSMENT — ACTIVITIES OF DAILY LIVING (ADL)
ADLS_ACUITY_SCORE: 58

## 2025-07-21 NOTE — TELEPHONE ENCOUNTER
Jennifer is at the [facility] waiting for appt this afternoon with DR Duncan at 2:45 she is wondering if anyone can fit her in, she is waiting but says if has to wait until 3pm and can't get in for IVF/PM then she is going home.     Spoke to [facility] and they said that there is no availability for IVF/PM     Message sent to DR Duncan and Arely Krueger, and they can move her appointment to 11:30     Message sent to scheduling to change appt time to 11:30.

## 2025-07-21 NOTE — ED PROVIDER NOTES
ED Provider Note  [facility]      History     Chief Complaint   Patient presents with    Sickle Cell Pain Crisis     Aching all over , attempted to get an appointment at the infusion but they are full     HPI  Jennifer Cervantes is a 25 year old female with history of Hb-SS disease, prior stroke with right-sided hemiplegia + hemiparesis who presents with body-pains. Patient reports worsening pain similar to prior sickle-cell pain that began exacerbating yesterday. She attempted to get appointment at infusion clinic but they are full. She discussed with her hematology team and decided to present to ED. No chest pain, no shortness of breath. No new weakness or sensory changes. Last BM was this morning and was normal.     ED Sickle cell Acute Pain Crisis Treatment Plan reviewed:   Primary Hematologist Team: Jose Rafael Duncan  Genotype: SS  ED Treatment Plan:  Dilaudid 1 mg IV q1h up to 3 doses  Toradol 30 mg IV x1   Maintenance IV fluids with LR  Other: Zofran 8 mg IV PRN nausea    Past Medical History  Past Medical History:   Diagnosis Date    Anxiety     Bleeding disorder     Blood clotting disorder     Cerebral infarction (H) 2015    Cognitive developmental delay     low IQ. 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 side (H)     right hand contractures    Iron overload due to repeated red blood cell transfusions     Migraines     Multiple subsegmental pulmonary emboli without acute cor pulmonale (H) 02/01/2021    Oppositional defiant behavior     Presence of intrauterine contraceptive device 2/18/2020    Superficial venous thrombosis of arm, right 03/25/2021    Uncomplicated asthma      Past Surgical History:   Procedure Laterality Date    AS INSERT TUNNELED CV 2 CATH W/O PORT/PUMP      CHOLECYSTECTOMY      CV RIGHT HEART CATH MEASUREMENTS RECORDED N/A 11/18/2021     Procedure: Right Heart Cath;  Surgeon: Jackson Stauffer MD;  Location:  HEART CARDIAC CATH LAB    INSERT PORT VASCULAR ACCESS Left 4/21/2021    Procedure: INSERTION, VASCULAR ACCESS PORT (NOT SURE ON SIDE UNTIL REMOVAL);  Surgeon: Rajan More MD;  Location: [facility] OR    IR CHEST PORT PLACEMENT > 5 YRS OF AGE  4/21/2021    IR CVC NON TUNNEL LINE REMOVAL  5/6/2021    IR CVC NON TUNNEL PLACEMENT > 5 YRS  04/07/2020    IR CVC NON TUNNEL PLACEMENT > 5 YRS  4/30/2021    IR CVC NON TUNNEL PLACEMENT > 5 YRS  9/7/2022    IR PORT REMOVAL LEFT  4/21/2021    REMOVE PORT VASCULAR ACCESS Left 4/21/2021    Procedure: REMOVAL, VASCULAR ACCESS PORT LEFT;  Surgeon: Rajan More MD;  Location: [facility] OR    REPAIR TENDON ELBOW Right 10/02/2019    Procedure: Right Elbow Flexor Lengthening, Flexor Pronator Slide Of Wrist and Finger, Thumb Adductor Lengthening;  Surgeon: Anai Franco MD;  Location: [facility] OR    TONSILLECTOMY Bilateral 10/02/2019    Procedure: Bilateral Tonsillectomy;  Surgeon: Farhana Guy MD;  Location: [facility] OR    ZZC BREAST REDUCTION (INCLUDES LIPO) TIER 3 Bilateral 04/23/2019     hydroxyurea (HYDREA) 500 MG capsule  methocarbamol (ROBAXIN) 750 MG tablet  methylPREDNISolone (MEDROL) 32 MG tablet  oxyCODONE IR (ROXICODONE) 10 MG tablet  pantoprazole (PROTONIX) 40 MG EC tablet  sertraline (ZOLOFT) 50 MG tablet  albuterol (PROVENTIL) (2.5 MG/3ML) 0.083% neb solution  aspirin (ASA) 81 MG chewable tablet  azelastine (ASTELIN) 0.1 % nasal spray  budesonide-formoterol (SYMBICORT/BREYNA) 160-4.5 MCG/ACT Inhaler  deferasirox (JADENU) 360 MG tablet  Deferiprone, Twice Daily, 1000 MG TABS  diclofenac (VOLTAREN) 1 % topical gel  diphenhydrAMINE (BENADRYL) 50 MG capsule  EPINEPHrine (ANY BX GENERIC EQUIV) 0.3 MG/0.3ML injection 2-pack  Fluticasone-Umeclidin-Vilant (TRELEGY ELLIPTA) 100-62.5-25 MCG/ACT oral inhaler  ibuprofen (ADVIL/MOTRIN) 800 MG tablet  ketotifen fumarate 0.035% 0.035 % SOLN ophthalmic  "solution  naloxone (NARCAN) 4 MG/0.1ML nasal spray  naproxen (NAPROSYN) 500 MG tablet      Allergies   Allergen Reactions    Contrast Dye Angioedema     Hives and breathing issues    Fish-Derived Products Hives    Seafood Hives    Adhesive Tape Hives     Primipore dressing causes hives    Gadolinium     Iodinated Contrast Media      Family History  Family History   Problem Relation Age of Onset    Sickle Cell Trait Mother     Hypertension Mother     Asthma Mother     Sickle Cell Trait Father     Glaucoma No family hx of     Macular Degeneration No family hx of     Diabetes No family hx of     Gout No family hx of      Social History   Social History     Tobacco Use    Smoking status: Never     Passive exposure: Never    Smokeless tobacco: Never   Substance Use Topics    Alcohol use: Not Currently     Alcohol/week: 0.0 standard drinks of alcohol    Drug use: Never      Past medical history, past surgical history, medications, allergies, family history, and social history were reviewed with the patient. No additional pertinent items.   A medically appropriate review of systems was performed with pertinent positives and negatives noted in the HPI, and all other systems negative.    Physical Exam   BP: 115/78  Pulse: 84  Temp: 98.3  F (36.8  C)  Resp: 16  Height: 162.6 cm (5' 4\")  Weight: 71 kg (156 lb 9.6 oz)  SpO2: 95 %  Physical Exam  Constitutional:       Appearance: Normal appearance.   HENT:      Head: Normocephalic and atraumatic.   Eyes:      General: No scleral icterus.     Extraocular Movements: Extraocular movements intact.   Cardiovascular:      Rate and Rhythm: Normal rate and regular rhythm.   Pulmonary:      Effort: Pulmonary effort is normal.      Breath sounds: Normal breath sounds.   Abdominal:      General: Abdomen is flat.      Palpations: Abdomen is soft.   Musculoskeletal:         General: Tenderness present.   Skin:     General: Skin is warm and dry.   Neurological:      General: No focal deficit " present.      Mental Status: She is alert and oriented to person, place, and time.      Cranial Nerves: No cranial nerve deficit.      Sensory: No sensory deficit.         ED Course, Procedures, & Data      Procedures                Results for orders placed or performed during the hospital encounter of 07/21/25   Comprehensive metabolic panel   Result Value Ref Range    Sodium 137 135 - 145 mmol/L    Potassium 3.9 3.4 - 5.3 mmol/L    Carbon Dioxide (CO2) 20 (L) 22 - 29 mmol/L    Anion Gap 12 7 - 15 mmol/L    Urea Nitrogen 6.5 6.0 - 20.0 mg/dL    Creatinine 0.51 0.51 - 0.95 mg/dL    GFR Estimate >90 >60 mL/min/1.73m2    Calcium 8.7 (L) 8.8 - 10.4 mg/dL    Chloride 105 98 - 107 mmol/L    Glucose 86 70 - 99 mg/dL    Alkaline Phosphatase 65 40 - 150 U/L    AST 62 (H) 0 - 45 U/L    ALT 41 0 - 50 U/L    Protein Total 8.2 6.4 - 8.3 g/dL    Albumin 4.9 3.5 - 5.2 g/dL    Bilirubin Total 3.7 (H) <=1.2 mg/dL   Reticulocyte count   Result Value Ref Range    % Reticulocyte 22.3 (H) 0.5 - 2.0 %    Absolute Reticulocyte 0.534 (H) 0.025 - 0.095 10e6/uL   CBC with platelets and differential   Result Value Ref Range    WBC Count 11.1 (H) 4.0 - 11.0 10e3/uL    RBC Count 2.39 (L) 3.80 - 5.20 10e6/uL    Hemoglobin 7.5 (L) 11.7 - 15.7 g/dL    Hematocrit 20.6 (L) 35.0 - 47.0 %    MCV 86 78 - 100 fL    MCH 31.4 26.5 - 33.0 pg    MCHC 36.4 31.5 - 36.5 g/dL    RDW 25.5 (H) 10.0 - 15.0 %    Platelet Count 433 150 - 450 10e3/uL    % Neutrophils 68 %    % Lymphocytes 20 %    % Monocytes 7 %    % Eosinophils 3 %    % Basophils 2 %    % Immature Granulocytes 1 %    NRBCs per 100 WBC 1 (H) <1 /100    Absolute Neutrophils 7.5 1.6 - 8.3 10e3/uL    Absolute Lymphocytes 2.2 0.8 - 5.3 10e3/uL    Absolute Monocytes 0.8 0.0 - 1.3 10e3/uL    Absolute Eosinophils 0.3 0.0 - 0.7 10e3/uL    Absolute Basophils 0.2 0.0 - 0.2 10e3/uL    Absolute Immature Granulocytes 0.1 <=0.4 10e3/uL    Absolute NRBCs 0.1 10e3/uL     Medications   ondansetron (ZOFRAN)  injection 8 mg (8 mg Intravenous $Given 7/21/25 1344)   HYDROmorphone (DILAUDID) injection 1 mg (1 mg Intravenous $Given 7/21/25 1600)   lactated ringers BOLUS 1,000 mL (0 mLs Intravenous Stopped 7/21/25 1558)   ketorolac (TORADOL) injection 30 mg (30 mg Intravenous $Given 7/21/25 1343)   heparin lock flush 100 unit/mL injection 5 mL (5 mLs Intravenous $Given 7/21/25 1639)          Critical care was not performed.     Medical Decision Making  The patient's presentation was of high complexity (a chronic illness severe exacerbation, progression, or side effect of treatment).    The patient's evaluation involved:  ordering and/or review of 3+ test(s) in this encounter (see separate area of note for details)    The patient's management necessitated high risk (a parenteral controlled substance).    Assessment & Plan    Hb-SS disease, prior stroke with right-sided hemiplegia + hemiparesis who presents with sickle cell pain. No symptoms/other findings concerning for acute chest syndrome, including no fever, no hypoxia, and hemodynamically stable. Patient's labs are at her baseline. Patient was treated per her care plan with IV fluids, anti-nausea and pain medication with good improvements in her symptoms. We will discharge her to follow up with her heme-onc team.     Discharge Medication List as of 7/21/2025  4:30 PM          Final diagnoses:   Sickle cell pain crisis (H)     Medical student: Megan Beh, MS4    --    ED Attending Physician Attestation    I Jesus Rhodes MD, cared for this patient with the Medical Student. I performed, or re-performed, the physical exam and medical decision-making. I have verified the accuracy of all the medical student findings and documentation above, and have edited as necessary.    Summary of HPI, PE, ED Course   Patient is a 25 year old female evaluated in the emergency department for sickle cell pain. Exam and ED course notable for no sx of acute chest. Improving after pain  medications. Feels able to discharge to outpatient management. After the completion of care in the emergency department, the patient was discharged.      Jesus Rhodes MD  Emergency Medicine     [facility] EMERGENCY DEPARTMENT  7/21/2025     Jesus Rhodes MD  07/21/25 7935

## 2025-07-21 NOTE — TELEPHONE ENCOUNTER
"Oncology Nurse Triage - Sickle Cell Pain Crisis:    Situation: Jennifer  calling about Sickle Cell Pain Crisis, requesting to be added on for IV fluids and pain medicine    Background:     Patient's last infusion was 7/18/2025   Last clinic visit date:7/18/2025 Patricia Mantilla CNP  Does patient have active treatment plan? Yes    Assessment of Symptoms:  Onset/Duration of symptoms: 2 day    Is it typical sickle cell pain? Yes  Location: \"everywhere\"  Character: Sharp  Intensity: 10 /10    Any radiation of pain, numbness, tingling, weakness, warmth, swelling, discoloration of arms or legs?No    Fever? No  Chest Pain Present: No  Shortness of breath: No    Other home therapies tried: HEAT/HEATING PAD and WARM BATH     Last home medication taken and when: last night at 11pm    Any Refills Needed?: No    Does patient have transportation & length of time to get to clinic: Yes  Pt already has an appointments this morning at [facility] starting at 8:15am. Dr. Duncan at 3:00pm.     Recommendations:     Meets protocol    If you do not hear from the infusion center by 2pm then you will not be able to get in for an infusion today. If symptoms worsen while waiting for call back, and/or you experience fever, chills, SOB, chest pain, cough, n/v, dizziness, numbness, swelling, discoloration of extremities, then seek emergency evaluation in Emergency Department.     Please note, if you are late for your appt, you risk losing your infusion appt as it may delay another patient's infusion who arrived on time.          "

## 2025-07-21 NOTE — CONFIDENTIAL NOTE
"Health Psychology - Follow up Visit  Confidential Summary*     The author of this note documented a reason for not sharing it with the patient.     REFERRAL SOURCE:  PCP     CHIEF COMPLAINT/REASON FOR VISIT  Cognitive behavioral therapy and behavioral counseling in context of chronic pain associated with sickle cell disease with recurrent hospitalizations, asthma, depressed mood and emotion dysregulation associated with family discord.       Patient seen for 60 minute psychotherapy session.  Patient was at home (outside) and provider was home.  Session provided via [employer].      Patient has agreed to receiving telehealth services.      The patient has been notified of following:   \"This video visit was conducted via video call between you and your physician/provider. We have found that certain health care needs can be provided without the need for an in-person physical exam. Video visits may be billed at different rates depending on your insurance coverage.  Please reach out to your insurance provider with any questions. If during the course of the call the physician/provider feels a video visit is not appropriate, you will not be charged for this service.\"     Patient has given verbal consent for telephone/Video visit? Yes       Subjective:  Patient described her ongoing frustration that she is not eligible for scheduled infusion.  She reported that she believes that she would have improved quality of life is she was able to receive pain medicine and fluids on at least one occasion per week and have oral pain medications to take for any sickle cell pain that she may experience.  She complained that her clinic providers do not appear to understand that her sickle cell is not the same as other patients and that she is not able to function on the 12 pills per week that she is provided.  In addition, she explained that she understands that she can call the infusion clinic to see if there are openings but that she then " "has to wait, sometimes all day, for a return call.  She reported that she would like to know that she has medication scheduled and that going to the ED when in pain is not her optimal choice but that she believes she has no alternatives.  She reported that she tries all the alternate pain medication strategies recommended to her and that they do not provide relief, these include naproxen.      Encouraged her to self advocate and to discuss her wants with her care team at upcoming appointments.      Objective:  Patient was on time for today s session, appropriately groomed and dressed.  She appeared but alert and oriented. Mood was dysphoric, with appropriate range of affect including tearfulness. Patient denied suicidal or assaultive ideation, plan, or intent.        Assessment:  The patient is coping with sickle cell disease and perception of racism associated with accusations that she might be misusing pain medications.  She also described challenges with regulating her emotions and having recurrent interpersonal discord., particularly with her family.  She also described chronic depressive symptoms and that she \"cries daily\".  She has an outside therapist and would like our sessions to focus on increasing her awareness of emotional health.    Answers submitted by the patient for this visit:  Patient Health Questionnaire (G7) (Submitted on 7/3/2025)  STEVE 7 TOTAL SCORE: 2     Plan:  See in 1 week, encouraged engaging in weekly therapy.       Time In:  10:00  Time Out: 11:00     Diagnosis:  Axis I Persistent depressive disorder vs recurrent MDD, current episode moderate; chronic pain; psychological factors associated with medical condition   Axis II Deferred   Axis III please see medical records for details   [place] IV Psychosocial and Environmental Stressors:Health and racial disparities         TREATMENT PLAN:  updated     Shandra Caruso, PhD, LP        *In accordance with the Rules of the [place] Board of " Psychology, it is noted that psychological descriptions and scientific procedures underlying psychological evaluations have limitations.  Absolute predictions cannot be made based on information in this report.

## 2025-07-21 NOTE — ED TRIAGE NOTES
Patient reports sharp pain all over her body     Triage Assessment (Adult)       Row Name 07/21/25 5728          Triage Assessment    Airway WDL WDL        Respiratory WDL    Respiratory WDL WDL        Skin Circulation/Temperature WDL    Skin Circulation/Temperature WDL WDL        Cardiac WDL    Cardiac WDL WDL        Peripheral/Neurovascular WDL    Peripheral Neurovascular WDL WDL        Cognitive/Neuro/Behavioral WDL    Cognitive/Neuro/Behavioral WDL WDL

## 2025-07-21 NOTE — NURSING NOTE
"Oncology Rooming Note    July 21, 2025 11:18 AM   Jennifer Cervantes is a 25 year old female who presents for:    Chief Complaint   Patient presents with    Oncology Clinic Visit     Sickle cell Anemia     Initial Vitals: There were no vitals taken for this visit. Estimated body mass index is 26.87 kg/m  as calculated from the following:    Height as of an earlier encounter on 7/21/25: 1.626 m (5' 4.02\").    Weight as of an earlier encounter on 7/21/25: 71 kg (156 lb 9.6 oz). There is no height or weight on file to calculate BSA.  Data Unavailable Comment: Data Unavailable   No LMP recorded. Patient has had an implant.  Allergies reviewed: Yes  Medications reviewed: Yes    Medications: Medication refills not needed today.  Pharmacy name entered into [facility]:    [facility] [place], [place] - [address] [place] [address] [phone]  [facility] - [place], [place] - [address] [place] [address]    PHQ9:  Did this patient require a PHQ9?: No      Clinical concerns: none.       Ninoska Barry              "

## 2025-07-21 NOTE — DISCHARGE INSTRUCTIONS
Thank you for coming to the [facility] ED.  You were seen in the emergency department and your sickle cell crisis care plan was followed.  We are glad you are starting to feel better.  Please continue to follow-up with your outpatient hematology team.  If you develop new or concerning symptoms please do not hesitate to return to the emergency department.

## 2025-07-21 NOTE — LETTER
7/21/2025      Jennifer Cervantes  [address]  [facility] [place] [address]      Dear Colleague,    Thank you for referring your patient, Jennifer Cervantes, to the [facility]. Please see a copy of my visit note below.    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    Interval History:  Jennifer is seen for routine follow-up to discuss pain management. We had tried to connect with her mother on the phone but lost the connection.  The course of this discussion had to do with better optimization of her pain regimen.  Jennifer has done an excellent job over many years reducing her opioid prescription needs and has been able to stay out of the hospital increasingly frequently over that same timeframe.  However, she notes that over the last few months, she has had an increase in her ED visits (she had 1 last week and feels like she may end up going later today because she cannot go to infusion) overall.  She does feel like her regular communication with her therapist and now her psychologist are helping manage some of the stress and pain that has bubbled up over the last 6 months.    She is currently having a little bit more pain in her back and legs most days and has tried to get into infusions more often with less success despite calling at the appropriate time.  She had heard through the [facility] that there was some opportunity to potentially schedule outpatient infusions.  She wanted to see if that was an option for her or if there were other ways to see if we can reduce the frequency  here.  She is trying hard to do what she can at home and states strict adherence to all of her treatments.  She is not currently working, though she feels like if she can get her emergency department frequency lower, the job she had previously may still be available.  She is not having any other acute healthcare concerns right now.  She did go see a primary care doctor today to replace Dr. Case.  She was also having some imaging.    She stated that she has had frustrations at times with our clinic and not been able to get in, and even sometimes with me as it relates to pain medication management, but overall she stated that she would not feel comfortable in the care of any other system.    Sickle Cell Disease Comprehensive Checklist  Bone Health/Avascular Necrosis Screening/Symptoms (each visit): no new concerns today  Leg Ulcer evaluation (every visit): nothing to note  Hypertension (every visit):stable none for several months  Last pulmonary evaluation (asthma, AMAN, pulm HTN): 9/28/22, due for follow-up  Stroke/silent cerebral infarct Hx (Y/N): Yes TIA ~2014, first event ~age 1 with full stroke and R sided weakness  Last PCP Visit: new PCP on 7/21/25  Vaccines:  PCV13: 5/13/19  Pneumovax (PPSV23): 3/04, 10/09, 7/12/19, 10/2024  Menactra: 4/2010, 9/2015 (MCV done 8/16/21)  MenB: 9/16/15, 5/13/19, 1/30/25  Influenza: 10/11/24  Audiology (chelation): done 2/27/24    Comparison to Previous Audiogram dated 6/6/2022:     Pure Tone Thresholds 250-8000 Hz:    RIGHT: stable    LEFT: stable     High Frequency Audiometry 9,000-16,000Hz:     RIGHT: stable    LEFT: stable     Word Recognition Score:    RIGHT: stable    LEFT: stable    Plan last reviewed with patient: 3/17/25    Patient background: 26 yo F, enjoys movies and kids though there are times where she does not really want to talk to people. Does not have a lot of social support at home.     Sickle Cell Disease History  Primary Hematologist Team: Perla  Jose Rafael  PCP: none  Genotype: SS  Acute Pain Crisis Treatment: (limiting IV)   ER   Dilaudid 1 mg IV q1h up to 3 doses  Toradol 30 mg IV x1   Maintenance IV fluids with LR  Other: Zofran 8 mg IV PRN nausea  Inpatient:  PCA Dilaudid 1 hr q30 minutes, no basal rate  Toradol 30 mg x6h x 4 hr  LR maintenance x 1-2 days  Other Medications: Zofran  ASA  Supportive Care: Docusate, Senna  Chronic Pain Medications:    Home regimen: oxycodone 15 mg p.o. q.4-6 hours p.r.n. breakthrough pain.  She also continues on Voltaren gel, and Zoloft among other medications.    -Also benefits from mental health visits, acupuncture  Baseline Hemoglobin: 7 g/dl without chronic transfusions  Hydroxyurea use: Yes  H/O blood transfusions: Yes, several (iron overload) Most recent 11/20/2021  H/O Transfusion Reactions: no  Antibodies:none  H/O Acute Chest Syndrome: Yes  Last episode:9/05/22 (previously 4/26/21, 10/2019)   ICU/intubation: not with 9/2022 admission  H/O Stroke: Yes (managed with chronic transfusions in the past, stopped late Spring 2020)  H/O VTE: Yes (2/2021)  H/O Cholecystectomy or Splenectomy: no  H/O Asthma, Pulm HTN, AVN, Leg Ulcers, Nephropathy, Retinopathy, etc: Iron overload, asthma, chronic lung disease, physical limitations from early stroke  Discuss alternative methods for pain control with Dr. Duncan next week  ---------------------------------------  Jennifer Cervantes's Goals (not discussed 7/21/25)    1-3 month goal:  Continue weekly therapy    6 month goal:      12 month goal:      Disease-specific goal(s):  Continue to decrease ED visits    Current Outpatient Medications   Medication Sig Dispense Refill     albuterol (PROVENTIL) (2.5 MG/3ML) 0.083% neb solution Take 1 vial (2.5 mg) by nebulization every 6 hours as needed for shortness of breath, wheezing or cough. 90 mL 0     aspirin (ASA) 81 MG chewable tablet Take 1 tablet (81 mg) by mouth 2 times daily 60 tablet 11     azelastine (ASTELIN) 0.1 % nasal spray Twice  daily, spray once into each nostril after blowing your nose. Stand still for 1-3 minutes after spraying into nostril, to avoid dripping. After that, your may blow your nose again, to clear the remaining medication. 30 mL 4     budesonide-formoterol (SYMBICORT/BREYNA) 160-4.5 MCG/ACT Inhaler Inhale 2 puffs twice daily plus 1-2 puffs as needed. May use up to 12 puffs per day. 20.4 g 11     deferasirox (JADENU) 360 MG tablet Take 4 tablets (1,440 mg) by mouth daily. 120 tablet 11     Deferiprone, Twice Daily, 1000 MG TABS Take 2,000 mg by mouth 2 times daily. 150 tablet 3     diclofenac (VOLTAREN) 1 % topical gel Apply 4 g topically 4 times daily as needed (moderate knee pain). 350 g 1     diphenhydrAMINE (BENADRYL) 50 MG capsule Administer 12  hours pre - IV contrast injection and 2 hours prior to contrast. Patient must have a . 2 capsule 0     EPINEPHrine (ANY BX GENERIC EQUIV) 0.3 MG/0.3ML injection 2-pack Inject 0.3 mLs (0.3 mg) into the muscle as needed for anaphylaxis. 1 each 1     Fluticasone-Umeclidin-Vilant (TRELEGY ELLIPTA) 100-62.5-25 MCG/ACT oral inhaler Inhale 1 puff into the lungs daily. 60 each 2     hydroxyurea (HYDREA) 500 MG capsule Take 6 capsules (3,000 mg) by mouth daily 180 capsule 11     ibuprofen (ADVIL/MOTRIN) 800 MG tablet Take 800 mg by mouth every 8 hours as needed for moderate pain (Patient not taking: Reported on 7/21/2025)       ketotifen fumarate 0.035% 0.035 % SOLN ophthalmic solution Place 1 drop into both eyes 2 times daily as needed for itching. 10 mL 11     methocarbamol (ROBAXIN) 750 MG tablet Take 1 tablet (750 mg) by mouth 4 times daily as needed for muscle spasms (during sickle pain crises. Okay to take scheduled while in pain). 60 tablet 1     methylPREDNISolone (MEDROL) 32 MG tablet Take 1 tab 12 hours before appointment and 1 tab 2 hours prior to the procedure with IV contrast. 2 tablet 0     naloxone (NARCAN) 4 MG/0.1ML nasal spray Spray 4 mg into one nostril  alternating nostrils as needed for opioid reversal. every 2-3 minutes until assistance arrives 0.2 mL 0     naproxen (NAPROSYN) 500 MG tablet Take 1 tablet (500 mg) by mouth daily as needed (pain). Take with food. Use sparingly and avoid taking on the same days you take ibuprofen. 30 tablet 1     oxyCODONE IR (ROXICODONE) 10 MG tablet Take 1 tablet (10 mg) by mouth every 6 hours as needed for moderate to severe pain. (Goal of less than 4 per day) 12 tablet 0     pantoprazole (PROTONIX) 40 MG EC tablet Take 1 tablet (40 mg) by mouth daily. 30 tablet 0     sertraline (ZOLOFT) 50 MG tablet Take 1 tablet (50 mg) by mouth daily. 90 tablet 3     Past Medical History  Past Medical History:   Diagnosis Date     Anxiety      Bleeding disorder      Blood clotting disorder      Cerebral infarction (H) 2015     Cognitive developmental delay     low IQ. Please recognize when managing pain and planning with her     Depressive disorder      Hemiplegia and hemiparesis following cerebral infarction affecting right dominant side (H)     right hand contractures     Iron overload due to repeated red blood cell transfusions      Migraines      Multiple subsegmental pulmonary emboli without acute cor pulmonale (H) 02/01/2021     Oppositional defiant behavior      Presence of intrauterine contraceptive device 2/18/2020     Superficial venous thrombosis of arm, right 03/25/2021     Uncomplicated asthma      Past Surgical History:   Procedure Laterality Date     AS INSERT TUNNELED CV 2 CATH W/O PORT/PUMP       CHOLECYSTECTOMY       CV RIGHT HEART CATH MEASUREMENTS RECORDED N/A 11/18/2021    Procedure: Right Heart Cath;  Surgeon: Jackson Stauffer MD;  Location:  HEART CARDIAC CATH LAB     INSERT PORT VASCULAR ACCESS Left 4/21/2021    Procedure: INSERTION, VASCULAR ACCESS PORT (NOT SURE ON SIDE UNTIL REMOVAL);  Surgeon: Rajan More MD;  Location: [facility] OR     IR CHEST PORT PLACEMENT > 5 YRS OF AGE  4/21/2021     IR CVC NON TUNNEL LINE  "REMOVAL  5/6/2021     IR CVC NON TUNNEL PLACEMENT > 5 YRS  04/07/2020     IR CVC NON TUNNEL PLACEMENT > 5 YRS  4/30/2021     IR CVC NON TUNNEL PLACEMENT > 5 YRS  9/7/2022     IR PORT REMOVAL LEFT  4/21/2021     REMOVE PORT VASCULAR ACCESS Left 4/21/2021    Procedure: REMOVAL, VASCULAR ACCESS PORT LEFT;  Surgeon: Rajan More MD;  Location: [facility] OR     REPAIR TENDON ELBOW Right 10/02/2019    Procedure: Right Elbow Flexor Lengthening, Flexor Pronator Slide Of Wrist and Finger, Thumb Adductor Lengthening;  Surgeon: Anai Franco MD;  Location: [facility] OR     TONSILLECTOMY Bilateral 10/02/2019    Procedure: Bilateral Tonsillectomy;  Surgeon: Farhana Guy MD;  Location: [facility] OR     ZZC BREAST REDUCTION (INCLUDES LIPO) TIER 3 Bilateral 04/23/2019     Allergies   Allergen Reactions     Contrast Dye Angioedema     Hives and breathing issues     Fish-Derived Products Hives     Seafood Hives     Adhesive Tape Hives     Primipore dressing causes hives     Gadolinium      Iodinated Contrast Media      Social History   Social History     Tobacco Use     Smoking status: Never     Passive exposure: Never     Smokeless tobacco: Never   Substance Use Topics     Alcohol use: Not Currently     Alcohol/week: 0.0 standard drinks of alcohol     Drug use: Never   Past medical history and social history were reviewed.    Physical Examination:  /79   Pulse 86   Temp 98.1  F (36.7  C) (Oral)   Resp 16   Ht 1.626 m (5' 4.02\")   Wt 71 kg (156 lb 9.6 oz)   SpO2 97%   BMI 26.87 kg/m      Wt Readings from Last 10 Encounters:   07/21/25 71 kg (156 lb 9.6 oz)   07/21/25 71 kg (156 lb 9.6 oz)   07/21/25 71 kg (156 lb 9.6 oz)   07/18/25 75.1 kg (165 lb 9.6 oz)   07/11/25 73.3 kg (161 lb 9.6 oz)   07/09/25 70.3 kg (155 lb)   07/01/25 69.9 kg (154 lb)   06/27/25 70.8 kg (156 lb)   06/24/25 71.1 kg (156 lb 12.8 oz)   06/20/25 71.2 kg (157 lb)     General: Pleasant female, NAD, smiling in clinic  Eyes: EOMI, " PERRL  Respiratory: normal respiratory effort on RA  Ext: no peripheral edema  Neurologic: chronic contracture of right arm and wrist. Steady gait though antalgic. A/O x 4.  Skin: No rashes, petechiae, or bruising noted on exposed skin.   Laboratory Data:  Recent Results (from the past 240 hours)   Basic metabolic panel    Collection Time: 07/14/25  5:00 PM   Result Value Ref Range    Sodium 138 135 - 145 mmol/L    Potassium 3.8 3.4 - 5.3 mmol/L    Chloride 104 98 - 107 mmol/L    Carbon Dioxide (CO2) 20 (L) 22 - 29 mmol/L    Anion Gap 14 7 - 15 mmol/L    Urea Nitrogen 6.0 6.0 - 20.0 mg/dL    Creatinine 0.51 0.51 - 0.95 mg/dL    GFR Estimate >90 >60 mL/min/1.73m2    Calcium 9.1 8.8 - 10.4 mg/dL    Glucose 98 70 - 99 mg/dL   Reticulocyte count    Collection Time: 07/14/25  5:00 PM   Result Value Ref Range    % Reticulocyte 24.1 (H) 0.5 - 2.0 %    Absolute Reticulocyte 0.501 (H) 0.025 - 0.095 10e6/uL   CBC with platelets and differential    Collection Time: 07/14/25  5:00 PM   Result Value Ref Range    WBC Count 10.9 4.0 - 11.0 10e3/uL    RBC Count 2.40 (L) 3.80 - 5.20 10e6/uL    Hemoglobin 7.6 (L) 11.7 - 15.7 g/dL    Hematocrit 21.0 (L) 35.0 - 47.0 %    MCV 88 78 - 100 fL    MCH 31.7 26.5 - 33.0 pg    MCHC 36.2 31.5 - 36.5 g/dL    RDW 25.7 (H) 10.0 - 15.0 %    Platelet Count 490 (H) 150 - 450 10e3/uL    % Neutrophils 65 %    % Lymphocytes 21 %    % Monocytes 8 %    % Eosinophils 4 %    % Basophils 2 %    % Immature Granulocytes 1 %    NRBCs per 100 WBC 5 (H) <1 /100    Absolute Neutrophils 7.1 1.6 - 8.3 10e3/uL    Absolute Lymphocytes 2.3 0.8 - 5.3 10e3/uL    Absolute Monocytes 0.9 0.0 - 1.3 10e3/uL    Absolute Eosinophils 0.4 0.0 - 0.7 10e3/uL    Absolute Basophils 0.2 0.0 - 0.2 10e3/uL    Absolute Immature Granulocytes 0.1 <=0.4 10e3/uL    Absolute NRBCs 0.5 10e3/uL   Basic metabolic panel    Collection Time: 07/18/25 10:39 AM   Result Value Ref Range    Sodium 138 135 - 145 mmol/L    Potassium 4.2 3.4 - 5.3 mmol/L     Chloride 106 98 - 107 mmol/L    Carbon Dioxide (CO2) 20 (L) 22 - 29 mmol/L    Anion Gap 12 7 - 15 mmol/L    Urea Nitrogen 8.0 6.0 - 20.0 mg/dL    Creatinine 0.48 (L) 0.51 - 0.95 mg/dL    GFR Estimate >90 >60 mL/min/1.73m2    Calcium 9.0 8.8 - 10.4 mg/dL    Glucose 86 70 - 99 mg/dL   Reticulocyte count    Collection Time: 07/18/25 10:39 AM   Result Value Ref Range    % Reticulocyte 18.3 (H) 0.5 - 2.0 %    Absolute Reticulocyte 0.428 (H) 0.025 - 0.095 10e6/uL   CBC with platelets and differential    Collection Time: 07/18/25 10:39 AM   Result Value Ref Range    WBC Count 9.5 4.0 - 11.0 10e3/uL    RBC Count 2.35 (L) 3.80 - 5.20 10e6/uL    Hemoglobin 7.2 (L) 11.7 - 15.7 g/dL    Hematocrit 20.3 (L) 35.0 - 47.0 %    MCV 86 78 - 100 fL    MCH 30.6 26.5 - 33.0 pg    MCHC 35.5 31.5 - 36.5 g/dL    RDW 24.4 (H) 10.0 - 15.0 %    Platelet Count 441 150 - 450 10e3/uL    % Neutrophils 69 %    % Lymphocytes 17 %    % Monocytes 8 %    % Eosinophils 3 %    % Basophils 2 %    % Immature Granulocytes 0 %    NRBCs per 100 WBC 2 (H) <1 /100    Absolute Neutrophils 6.5 1.6 - 8.3 10e3/uL    Absolute Lymphocytes 1.6 0.8 - 5.3 10e3/uL    Absolute Monocytes 0.8 0.0 - 1.3 10e3/uL    Absolute Eosinophils 0.3 0.0 - 0.7 10e3/uL    Absolute Basophils 0.2 0.0 - 0.2 10e3/uL    Absolute Immature Granulocytes 0.0 <=0.4 10e3/uL    Absolute NRBCs 0.2 10e3/uL   Comprehensive metabolic panel    Collection Time: 07/21/25  1:31 PM   Result Value Ref Range    Sodium 137 135 - 145 mmol/L    Potassium 3.9 3.4 - 5.3 mmol/L    Carbon Dioxide (CO2) 20 (L) 22 - 29 mmol/L    Anion Gap 12 7 - 15 mmol/L    Urea Nitrogen 6.5 6.0 - 20.0 mg/dL    Creatinine 0.51 0.51 - 0.95 mg/dL    GFR Estimate >90 >60 mL/min/1.73m2    Calcium 8.7 (L) 8.8 - 10.4 mg/dL    Chloride 105 98 - 107 mmol/L    Glucose 86 70 - 99 mg/dL    Alkaline Phosphatase 65 40 - 150 U/L    AST 62 (H) 0 - 45 U/L    ALT 41 0 - 50 U/L    Protein Total 8.2 6.4 - 8.3 g/dL    Albumin 4.9 3.5 - 5.2 g/dL     Bilirubin Total 3.7 (H) <=1.2 mg/dL   Reticulocyte count    Collection Time: 07/21/25  1:31 PM   Result Value Ref Range    % Reticulocyte 22.3 (H) 0.5 - 2.0 %    Absolute Reticulocyte 0.534 (H) 0.025 - 0.095 10e6/uL   CBC with platelets and differential    Collection Time: 07/21/25  1:31 PM   Result Value Ref Range    WBC Count 11.1 (H) 4.0 - 11.0 10e3/uL    RBC Count 2.39 (L) 3.80 - 5.20 10e6/uL    Hemoglobin 7.5 (L) 11.7 - 15.7 g/dL    Hematocrit 20.6 (L) 35.0 - 47.0 %    MCV 86 78 - 100 fL    MCH 31.4 26.5 - 33.0 pg    MCHC 36.4 31.5 - 36.5 g/dL    RDW 25.5 (H) 10.0 - 15.0 %    Platelet Count 433 150 - 450 10e3/uL    % Neutrophils 68 %    % Lymphocytes 20 %    % Monocytes 7 %    % Eosinophils 3 %    % Basophils 2 %    % Immature Granulocytes 1 %    NRBCs per 100 WBC 1 (H) <1 /100    Absolute Neutrophils 7.5 1.6 - 8.3 10e3/uL    Absolute Lymphocytes 2.2 0.8 - 5.3 10e3/uL    Absolute Monocytes 0.8 0.0 - 1.3 10e3/uL    Absolute Eosinophils 0.3 0.0 - 0.7 10e3/uL    Absolute Basophils 0.2 0.0 - 0.2 10e3/uL    Absolute Immature Granulocytes 0.1 <=0.4 10e3/uL    Absolute NRBCs 0.1 10e3/uL       I reviewed the above labs and imaging today.        Assessment and Plan:  1. Sickle Cell HgbSS Disease  2. Acute flare of chronic pain  3. Iron overload  4. 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 History of CVA  6. Hearing loss  7. Mental Health    Jennifer continues to work on management of acute on chronic pain.  The focus of the discussion today, based on her preference, wants to think about ways to reduce her frequency of emergency department visits.  We discussed in detail potential contributors to this phenomenon, including perhaps that we have reached, at least for now, a lower threshold for tolerance with her daily opioid needs.  There was a request for scheduled infusions, but because this is really untenable for all patients over the long-term, we are trying not to move towards this  because we cannot practically guarantee infusion space since we share infusion beds with oncology and BMT.    The compromise that we made today was that we would focus on having a few more medications at home and she would work diligently to use them as needed.  I agreed to move up to 18 tablets a week for the next 5 to 6 weeks, at least through Labor Day, and see if that reduced emergency department visits.  This allows her to have at least 2 oxycodone every day while still allowing for an extra dose a couple times throughout the week.  My hope is that we will see some reduction in the emergency department visits and if so, we may find that there is a sweet spot for pill count at home to get her back to where she was a year or two ago when she was in the emergency department relatively rarely, perhaps monthly.  She is willing to try this and is optimistic.  She has already filled her prescription today, so we will start it next week, or as an alternative, if she needs something before the weeks and, we can give a very short additional prescription to match the future plan.  After that 6-week brittany, upon reevaluation, we will mutually determine the degree of success and plot out the next steps.    HbSS with acute on chronic pain and iron overload  -Continue HU 3000 mg daily  -Monthly crizanlizumab    Pain management  -Mix of pain medications including oxycodone, Robaxin, ibuprofen or naproxen, Tylenol. No more than 2 infusions per week.  Increasing oxycodone quantity to 18 tablets a week, same dose, and continuing to stress primarily as needed use rather than scheduled.  I also stressed that we are not making plans to schedule pain medication infusions as a regular approach because of the lack of space and the difficulty and implementation for everybody.  I mention that I would try to determine the details around what she had heard through communication with others who have sickle cell.  -Continue diligent home  management with current medications, heat, rest, compression, warm baths.   -Continue to reassess plan for home oxycodone use monthly.  Continue prescriptions for 12 tablets oxycodone per week (10 mg tablets).  We can continue to decrease weekly if she desires and work jointly to set goals.  We are aiming to avoid any dose or quantity escalations which she continues to be successful with.   --If unable to manage at home can go to ED  with continued plan to use IV Dilaudid and take a break from ketamine (10/2/23). No PCA use and goal for any admission would still be to discharge by 5 days or less    Iron Overload  A Ferriscan was performed 1/30/25 showing a decrease in her average iron concentration, now 24 mg/g dry tissue, previously 31.8. Repeat Ferriscan 7/14 with slight increase in average LIC (25.6 mg/g dry tissue).  She remains on Jadenu 1440 mg daily and deferiprone 2000 mg BID which we will continue.    Stroke Hx  Holding off on chronic transfusions right now per mutually agreed upon management. Trying to reduce iron further before considering restart.  -Still on [facility]    Mental Health  Routinely following with therapy and health psychology. Finds this very beneficial, particularly the therapy part as she connects very well with the therapist, and she plans to continue both.    Health maintenance  Up-to-date    Plan:   Follow-up 8/15 as planned with JOCELYN Mantilla.        Eric Duncan MD  Classical Hematologist  Division of Hematology, Oncology, and Transplantation  [facility]  [facility]      39 minutes spent by me on the date of the encounter doing chart review, history and exam, documentation and further activities per the note    ---  The longitudinal plan of care for the diagnosis(es)/condition(s) as documented were addressed during this visit. Due to the added complexity in care, I will continue to support Jennifer in the subsequent management and with ongoing continuity  of care.                  Again, thank you for allowing me to participate in the care of your patient.        Sincerely,        Eric Duncan MD    Electronically signed

## 2025-07-21 NOTE — PROGRESS NOTES
"  Assessment & Plan     Sickle cell pain crisis (H)  Discussed limitations of current plan and have limited availability of infusion center for crisis management.  Encourage patient to follow-up with her hematologist details of outpatient plan that would allow her to have control at home and avoid ER visits.    Hemiplegia and hemiparesis following cerebral infarction affecting right dominant side (H)  Deficits have been fairly stable.  Patient is aware of the risks associated with sickle cell disease and recurrent strokes.  She is familiar with symptoms that should prompt urgent evaluation in the ER.      I spent a total of 40 minutes on the day of the visit.   Time spent by me today doing chart review, history and exam, documentation and further activities per the note  BMI  Estimated body mass index is 26.87 kg/m  as calculated from the following:    Height as of this encounter: 1.626 m (5' 4.02\").    Weight as of this encounter: 71 kg (156 lb 9.6 oz).       Counseling  Appropriate preventive services were addressed with this patient via screening, questionnaire, or discussion as appropriate for fall prevention, nutrition, physical activity, Tobacco-use cessation, social engagement, weight loss and cognition.  Checklist reviewing preventive services available has been given to the patient.  Reviewed patient's diet, addressing concerns and/or questions.   She is at risk for lack of exercise and has been provided with information to increase physical activity for the benefit of her well-being.           Leona Rodriguez is a 25 year old, presenting for the following health issues:  Establish Care      7/21/2025     8:30 AM   Additional Questions   Roomed by EM     History of Present Illness       Reason for visit:  New Primary care doctor.   She is taking medications regularly.      Patient is here to establish care. She reports that she is dealing with pain crisis since Saturday. She is trying to get into " "infusion center today to avoid going to ER. She reports that she often is not able to get into an infusion center far from home due to driving distance. She is still trying to coordinate an infusion center visit for today.   Patient has Hematology visit later today. She is wondering if she can coordinate infusions to prevent ER visits.  She is optimistic about working with hematology closely and finding an optimal regimen for management of her pain as well as sickle cell symptoms.      Objective    /79 (BP Location: Right arm, Patient Position: Sitting, Cuff Size: Adult Regular)   Pulse 84   Temp 98.1  F (36.7  C) (Oral)   Resp 16   Ht 1.626 m (5' 4.02\")   Wt 71 kg (156 lb 9.6 oz)   SpO2 (!) 91%   BMI 26.87 kg/m    Body mass index is 26.87 kg/m .  Physical Exam   GENERAL: alert and no distress  EYES: Eyes grossly normal to inspection, PERRL and conjunctivae and sclerae normal  RESP: lungs clear to auscultation - no rales, rhonchi or wheezes  CV: regular rate and rhythm, normal S1 S2, no S3 or S4, no murmur, click or rub, no peripheral edema  ABDOMEN: soft, nontender and bowel sounds normal  MS: no gross musculoskeletal defects noted, no edema  SKIN: no suspicious lesions or rashes  NEURO: mentation intact and right hemiparesis             Signed Electronically by: Chen Preciado MD    "

## 2025-07-22 ENCOUNTER — TELEPHONE (OUTPATIENT)
Dept: PULMONOLOGY | Facility: CLINIC | Age: 25
End: 2025-07-22
Payer: MEDICAID

## 2025-07-22 NOTE — TELEPHONE ENCOUNTER
Patient Contacted for the patient to call back and schedule the following:    Appointment type: RTA  Provider: FELLOWS  Return date: Nov 2025  Specialty phone number: [phone]  Additional appointment(s) needed: na  Additonal Notes: na

## 2025-07-22 NOTE — PROGRESS NOTES
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    Interval History:  Jennifer is seen for routine follow-up to discuss pain management. We had tried to connect with her mother on the phone but lost the connection.  The course of this discussion had to do with better optimization of her pain regimen.  Jennifer has done an excellent job over many years reducing her opioid prescription needs and has been able to stay out of the hospital increasingly frequently over that same timeframe.  However, she notes that over the last few months, she has had an increase in her ED visits (she had 1 last week and feels like she may end up going later today because she cannot go to infusion) overall.  She does feel like her regular communication with her therapist and now her psychologist are helping manage some of the stress and pain that has bubbled up over the last 6 months.    She is currently having a little bit more pain in her back and legs most days and has tried to get into infusions more often with less success despite calling at the appropriate time.  She had heard through the [employer] that there was some opportunity to potentially schedule outpatient infusions.  She wanted to see if that was an option for her or if there were other ways to see if we can reduce the frequency here.  She is trying hard to do what she can at home and states strict adherence to all of her treatments.  She is not currently working, though she feels like if she can get her emergency department frequency lower, the job she had  previously may still be available.  She is not having any other acute healthcare concerns right now.  She did go see a primary care doctor today to replace Dr. Case.  She was also having some imaging.    She stated that she has had frustrations at times with our clinic and not been able to get in, and even sometimes with me as it relates to pain medication management, but overall she stated that she would not feel comfortable in the care of any other system.    Sickle Cell Disease Comprehensive Checklist  Bone Health/Avascular Necrosis Screening/Symptoms (each visit): no new concerns today  Leg Ulcer evaluation (every visit): nothing to note  Hypertension (every visit):stable none for several months  Last pulmonary evaluation (asthma, AMAN, pulm HTN): 9/28/22, due for follow-up  Stroke/silent cerebral infarct Hx (Y/N): Yes TIA ~2014, first event ~age 1 with full stroke and R sided weakness  Last PCP Visit: new PCP on 7/21/25  Vaccines:  PCV13: 5/13/19  Pneumovax (PPSV23): 3/04, 10/09, 7/12/19, 10/2024  Menactra: 4/2010, 9/2015 (MCV done 8/16/21)  MenB: 9/16/15, 5/13/19, 1/30/25  Influenza: 10/11/24  Audiology (chelation): done 2/27/24    Comparison to Previous Audiogram dated 6/6/2022:     Pure Tone Thresholds 250-8000 Hz:    RIGHT: stable    LEFT: stable     High Frequency Audiometry 9,000-16,000Hz:     RIGHT: stable    LEFT: stable     Word Recognition Score:    RIGHT: stable    LEFT: stable    Plan last reviewed with patient: 3/17/25    Patient background: 26 yo F, enjoys movies and kids though there are times where she does not really want to talk to people. Does not have a lot of social support at home.     Sickle Cell Disease History  Primary Hematologist Team: Jose Rafael Duncan  PCP: none  Genotype: SS  Acute Pain Crisis Treatment: (limiting IV)   ER   Dilaudid 1 mg IV q1h up to 3 doses  Toradol 30 mg IV x1   Maintenance IV fluids with LR  Other: Zofran 8 mg IV PRN nausea  Inpatient:  PCA Dilaudid 1 hr q30  minutes, no basal rate  Toradol 30 mg x6h x 4 hr  LR maintenance x 1-2 days  Other Medications: Zofran  ASA  Supportive Care: Docusate Senna  Chronic Pain Medications:    Home regimen: oxycodone 15 mg p.o. q.4-6 hours p.r.n. breakthrough pain.  She also continues on Voltaren gel, and Zoloft among other medications.    -Also benefits from mental health visits, acupuncture  Baseline Hemoglobin: 7 g/dl without chronic transfusions  Hydroxyurea use: Yes  H/O blood transfusions: Yes, several (iron overload) Most recent 11/20/2021  H/O Transfusion Reactions: no  Antibodies:none  H/O Acute Chest Syndrome: Yes  Last episode:9/05/22 (previously 4/26/21, 10/2019)   ICU/intubation: not with 9/2022 admission  H/O Stroke: Yes (managed with chronic transfusions in the past, stopped late Spring 2020)  H/O VTE: Yes (2/2021)  H/O Cholecystectomy or Splenectomy: no  H/O Asthma, Pulm HTN, AVN, Leg Ulcers, Nephropathy, Retinopathy, etc: Iron overload, asthma, chronic lung disease, physical limitations from early stroke  Discuss alternative methods for pain control with Dr. Duncan next week  ---------------------------------------  Jennifer Cervantes's Goals (not discussed 7/21/25)    1-3 month goal:  Continue weekly therapy    6 month goal:      12 month goal:      Disease-specific goal(s):  Continue to decrease ED visits    Current Outpatient Medications   Medication Sig Dispense Refill    albuterol (PROVENTIL) (2.5 MG/3ML) 0.083% neb solution Take 1 vial (2.5 mg) by nebulization every 6 hours as needed for shortness of breath, wheezing or cough. 90 mL 0    aspirin (ASA) 81 MG chewable tablet Take 1 tablet (81 mg) by mouth 2 times daily 60 tablet 11    azelastine (ASTELIN) 0.1 % nasal spray Twice daily, spray once into each nostril after blowing your nose. Stand still for 1-3 minutes after spraying into nostril, to avoid dripping. After that, your may blow your nose again, to clear the remaining medication. 30 mL 4     budesonide-formoterol (SYMBICORT/BREYNA) 160-4.5 MCG/ACT Inhaler Inhale 2 puffs twice daily plus 1-2 puffs as needed. May use up to 12 puffs per day. 20.4 g 11    deferasirox (JADENU) 360 MG tablet Take 4 tablets (1,440 mg) by mouth daily. 120 tablet 11    Deferiprone, Twice Daily, 1000 MG TABS Take 2,000 mg by mouth 2 times daily. 150 tablet 3    diclofenac (VOLTAREN) 1 % topical gel Apply 4 g topically 4 times daily as needed (moderate knee pain). 350 g 1    diphenhydrAMINE (BENADRYL) 50 MG capsule Administer 12  hours pre - IV contrast injection and 2 hours prior to contrast. Patient must have a . 2 capsule 0    EPINEPHrine (ANY BX GENERIC EQUIV) 0.3 MG/0.3ML injection 2-pack Inject 0.3 mLs (0.3 mg) into the muscle as needed for anaphylaxis. 1 each 1    Fluticasone-Umeclidin-Vilant (TRELEGY ELLIPTA) 100-62.5-25 MCG/ACT oral inhaler Inhale 1 puff into the lungs daily. 60 each 2    hydroxyurea (HYDREA) 500 MG capsule Take 6 capsules (3,000 mg) by mouth daily 180 capsule 11    ibuprofen (ADVIL/MOTRIN) 800 MG tablet Take 800 mg by mouth every 8 hours as needed for moderate pain (Patient not taking: Reported on 7/21/2025)      ketotifen fumarate 0.035% 0.035 % SOLN ophthalmic solution Place 1 drop into both eyes 2 times daily as needed for itching. 10 mL 11    methocarbamol (ROBAXIN) 750 MG tablet Take 1 tablet (750 mg) by mouth 4 times daily as needed for muscle spasms (during sickle pain crises. Okay to take scheduled while in pain). 60 tablet 1    methylPREDNISolone (MEDROL) 32 MG tablet Take 1 tab 12 hours before appointment and 1 tab 2 hours prior to the procedure with IV contrast. 2 tablet 0    naloxone (NARCAN) 4 MG/0.1ML nasal spray Spray 4 mg into one nostril alternating nostrils as needed for opioid reversal. every 2-3 minutes until assistance arrives 0.2 mL 0    naproxen (NAPROSYN) 500 MG tablet Take 1 tablet (500 mg) by mouth daily as needed (pain). Take with food. Use sparingly and avoid taking on  the same days you take ibuprofen. 30 tablet 1    oxyCODONE IR (ROXICODONE) 10 MG tablet Take 1 tablet (10 mg) by mouth every 6 hours as needed for moderate to severe pain. (Goal of less than 4 per day) 12 tablet 0    pantoprazole (PROTONIX) 40 MG EC tablet Take 1 tablet (40 mg) by mouth daily. 30 tablet 0    sertraline (ZOLOFT) 50 MG tablet Take 1 tablet (50 mg) by mouth daily. 90 tablet 3     Past Medical History  Past Medical History:   Diagnosis Date    Anxiety     Bleeding disorder     Blood clotting disorder     Cerebral infarction (H) 2015    Cognitive developmental delay     low IQ. 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 side (H)     right hand contractures    Iron overload due to repeated red blood cell transfusions     Migraines     Multiple subsegmental pulmonary emboli without acute cor pulmonale (H) 02/01/2021    Oppositional defiant behavior     Presence of intrauterine contraceptive device 2/18/2020    Superficial venous thrombosis of arm, right 03/25/2021    Uncomplicated asthma      Past Surgical History:   Procedure Laterality Date    AS INSERT TUNNELED CV 2 CATH W/O PORT/PUMP      CHOLECYSTECTOMY      CV RIGHT HEART CATH MEASUREMENTS RECORDED N/A 11/18/2021    Procedure: Right Heart Cath;  Surgeon: Jackson Stauffer MD;  Location:  HEART CARDI[facility] CATH LAB    INSERT PORT VASCULAR ACCESS Left 4/21/2021    Procedure: INSERTION, VASCULAR ACCESS PORT (NOT SURE ON SIDE UNTIL REMOVAL);  Surgeon: Rajan More MD;  Location: [facility] OR    IR CHEST PORT PLACEMENT > 5 YRS OF AGE  4/21/2021    IR CVC NON TUNNEL LINE REMOVAL  5/6/2021    IR CVC NON TUNNEL PLACEMENT > 5 YRS  04/07/2020    IR CVC NON TUNNEL PLACEMENT > 5 YRS  4/30/2021    IR CVC NON TUNNEL PLACEMENT > 5 YRS  9/7/2022    IR PORT REMOVAL LEFT  4/21/2021    REMOVE PORT VASCULAR ACCESS Left 4/21/2021    Procedure: REMOVAL,  "VASCULAR ACCESS PORT LEFT;  Surgeon: Rajan More MD;  Location: [facility] OR    REPAIR TENDON ELBOW Right 10/02/2019    Procedure: Right Elbow Flexor Lengthening, Flexor Pronator Slide Of Wrist and Finger, Thumb Adductor Lengthening;  Surgeon: Anai Franco MD;  Location: [facility] OR    TONSILLECTOMY Bilateral 10/02/2019    Procedure: Bilateral Tonsillectomy;  Surgeon: Farhana Guy MD;  Location: [facility] OR    ZZC BREAST REDUCTION (INCLUDES LIPO) TIER 3 Bilateral 04/23/2019     Allergies   Allergen Reactions    Contrast Dye Angioedema     Hives and breathing issues    Fish-Derived Products Hives    Seafood Hives    Adhesive Tape Hives     Primipore dressing causes hives    Gadolinium     Iodinated Contrast Media      Social History   Social History     Tobacco Use    Smoking status: Never     Passive exposure: Never    Smokeless tobacco: Never   Substance Use Topics    Alcohol use: Not Currently     Alcohol/week: 0.0 standard drinks of alcohol    Drug use: Never   Past medical history and social history were reviewed.    Physical Examination:  /79   Pulse 86   Temp 98.1  F (36.7  C) (Oral)   Resp 16   Ht 1.626 m (5' 4.02\")   Wt 71 kg (156 lb 9.6 oz)   SpO2 97%   BMI 26.87 kg/m      Wt Readings from Last 10 Encounters:   07/21/25 71 kg (156 lb 9.6 oz)   07/21/25 71 kg (156 lb 9.6 oz)   07/21/25 71 kg (156 lb 9.6 oz)   07/18/25 75.1 kg (165 lb 9.6 oz)   07/11/25 73.3 kg (161 lb 9.6 oz)   07/09/25 70.3 kg (155 lb)   07/01/25 69.9 kg (154 lb)   06/27/25 70.8 kg (156 lb)   06/24/25 71.1 kg (156 lb 12.8 oz)   06/20/25 71.2 kg (157 lb)     General: Pleasant female, NAD, smiling in clinic  Eyes: EOMI, PERRL  Respiratory: normal respiratory effort on RA  Ext: no peripheral edema  Neurologic: chronic contracture of right arm and wrist. Steady gait though antalgic. A/O x 4.  Skin: No rashes, petechiae, or bruising noted on exposed skin.   Laboratory Data:  Recent Results (from the past 240 hours) "   Basic metabolic panel    Collection Time: 07/14/25  5:00 PM   Result Value Ref Range    Sodium 138 135 - 145 mmol/L    Potassium 3.8 3.4 - 5.3 mmol/L    Chloride 104 98 - 107 mmol/L    Carbon Dioxide (CO2) 20 (L) 22 - 29 mmol/L    Anion Gap 14 7 - 15 mmol/L    Urea Nitrogen 6.0 6.0 - 20.0 mg/dL    Creatinine 0.51 0.51 - 0.95 mg/dL    GFR Estimate >90 >60 mL/min/1.73m2    Calcium 9.1 8.8 - 10.4 mg/dL    Glucose 98 70 - 99 mg/dL   Reticulocyte count    Collection Time: 07/14/25  5:00 PM   Result Value Ref Range    % Reticulocyte 24.1 (H) 0.5 - 2.0 %    Absolute Reticulocyte 0.501 (H) 0.025 - 0.095 10e6/uL   CBC with platelets and differential    Collection Time: 07/14/25  5:00 PM   Result Value Ref Range    WBC Count 10.9 4.0 - 11.0 10e3/uL    RBC Count 2.40 (L) 3.80 - 5.20 10e6/uL    Hemoglobin 7.6 (L) 11.7 - 15.7 g/dL    Hematocrit 21.0 (L) 35.0 - 47.0 %    MCV 88 78 - 100 fL    MCH 31.7 26.5 - 33.0 pg    MCHC 36.2 31.5 - 36.5 g/dL    RDW 25.7 (H) 10.0 - 15.0 %    Platelet Count 490 (H) 150 - 450 10e3/uL    % Neutrophils 65 %    % Lymphocytes 21 %    % Monocytes 8 %    % Eosinophils 4 %    % Basophils 2 %    % Immature Granulocytes 1 %    NRBCs per 100 WBC 5 (H) <1 /100    Absolute Neutrophils 7.1 1.6 - 8.3 10e3/uL    Absolute Lymphocytes 2.3 0.8 - 5.3 10e3/uL    Absolute Monocytes 0.9 0.0 - 1.3 10e3/uL    Absolute Eosinophils 0.4 0.0 - 0.7 10e3/uL    Absolute Basophils 0.2 0.0 - 0.2 10e3/uL    Absolute Immature Granulocytes 0.1 <=0.4 10e3/uL    Absolute NRBCs 0.5 10e3/uL   Basic metabolic panel    Collection Time: 07/18/25 10:39 AM   Result Value Ref Range    Sodium 138 135 - 145 mmol/L    Potassium 4.2 3.4 - 5.3 mmol/L    Chloride 106 98 - 107 mmol/L    Carbon Dioxide (CO2) 20 (L) 22 - 29 mmol/L    Anion Gap 12 7 - 15 mmol/L    Urea Nitrogen 8.0 6.0 - 20.0 mg/dL    Creatinine 0.48 (L) 0.51 - 0.95 mg/dL    GFR Estimate >90 >60 mL/min/1.73m2    Calcium 9.0 8.8 - 10.4 mg/dL    Glucose 86 70 - 99 mg/dL    Reticulocyte count    Collection Time: 07/18/25 10:39 AM   Result Value Ref Range    % Reticulocyte 18.3 (H) 0.5 - 2.0 %    Absolute Reticulocyte 0.428 (H) 0.025 - 0.095 10e6/uL   CBC with platelets and differential    Collection Time: 07/18/25 10:39 AM   Result Value Ref Range    WBC Count 9.5 4.0 - 11.0 10e3/uL    RBC Count 2.35 (L) 3.80 - 5.20 10e6/uL    Hemoglobin 7.2 (L) 11.7 - 15.7 g/dL    Hematocrit 20.3 (L) 35.0 - 47.0 %    MCV 86 78 - 100 fL    MCH 30.6 26.5 - 33.0 pg    MCHC 35.5 31.5 - 36.5 g/dL    RDW 24.4 (H) 10.0 - 15.0 %    Platelet Count 441 150 - 450 10e3/uL    % Neutrophils 69 %    % Lymphocytes 17 %    % Monocytes 8 %    % Eosinophils 3 %    % Basophils 2 %    % Immature Granulocytes 0 %    NRBCs per 100 WBC 2 (H) <1 /100    Absolute Neutrophils 6.5 1.6 - 8.3 10e3/uL    Absolute Lymphocytes 1.6 0.8 - 5.3 10e3/uL    Absolute Monocytes 0.8 0.0 - 1.3 10e3/uL    Absolute Eosinophils 0.3 0.0 - 0.7 10e3/uL    Absolute Basophils 0.2 0.0 - 0.2 10e3/uL    Absolute Immature Granulocytes 0.0 <=0.4 10e3/uL    Absolute NRBCs 0.2 10e3/uL   Comprehensive metabolic panel    Collection Time: 07/21/25  1:31 PM   Result Value Ref Range    Sodium 137 135 - 145 mmol/L    Potassium 3.9 3.4 - 5.3 mmol/L    Carbon Dioxide (CO2) 20 (L) 22 - 29 mmol/L    Anion Gap 12 7 - 15 mmol/L    Urea Nitrogen 6.5 6.0 - 20.0 mg/dL    Creatinine 0.51 0.51 - 0.95 mg/dL    GFR Estimate >90 >60 mL/min/1.73m2    Calcium 8.7 (L) 8.8 - 10.4 mg/dL    Chloride 105 98 - 107 mmol/L    Glucose 86 70 - 99 mg/dL    Alkaline Phosphatase 65 40 - 150 U/L    AST 62 (H) 0 - 45 U/L    ALT 41 0 - 50 U/L    Protein Total 8.2 6.4 - 8.3 g/dL    Albumin 4.9 3.5 - 5.2 g/dL    Bilirubin Total 3.7 (H) <=1.2 mg/dL   Reticulocyte count    Collection Time: 07/21/25  1:31 PM   Result Value Ref Range    % Reticulocyte 22.3 (H) 0.5 - 2.0 %    Absolute Reticulocyte 0.534 (H) 0.025 - 0.095 10e6/uL   CBC with platelets and differential    Collection Time: 07/21/25  1:31  PM   Result Value Ref Range    WBC Count 11.1 (H) 4.0 - 11.0 10e3/uL    RBC Count 2.39 (L) 3.80 - 5.20 10e6/uL    Hemoglobin 7.5 (L) 11.7 - 15.7 g/dL    Hematocrit 20.6 (L) 35.0 - 47.0 %    MCV 86 78 - 100 fL    MCH 31.4 26.5 - 33.0 pg    MCHC 36.4 31.5 - 36.5 g/dL    RDW 25.5 (H) 10.0 - 15.0 %    Platelet Count 433 150 - 450 10e3/uL    % Neutrophils 68 %    % Lymphocytes 20 %    % Monocytes 7 %    % Eosinophils 3 %    % Basophils 2 %    % Immature Granulocytes 1 %    NRBCs per 100 WBC 1 (H) <1 /100    Absolute Neutrophils 7.5 1.6 - 8.3 10e3/uL    Absolute Lymphocytes 2.2 0.8 - 5.3 10e3/uL    Absolute Monocytes 0.8 0.0 - 1.3 10e3/uL    Absolute Eosinophils 0.3 0.0 - 0.7 10e3/uL    Absolute Basophils 0.2 0.0 - 0.2 10e3/uL    Absolute Immature Granulocytes 0.1 <=0.4 10e3/uL    Absolute NRBCs 0.1 10e3/uL       I reviewed the above labs and imaging today.        Assessment and Plan:  1. Sickle Cell HgbSS Disease  2. Acute flare of chronic pain  3. Iron overload  4. 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 History of CVA  6. Hearing loss  7. Mental Health    Jennifer continues to work on management of acute on chronic pain.  The focus of the discussion today, based on her preference, wants to think about ways to reduce her frequency of emergency department visits.  We discussed in detail potential contributors to this phenomenon, including perhaps that we have reached, at least for now, a lower threshold for tolerance with her daily opioid needs.  There was a request for scheduled infusions, but because this is really untenable for all patients over the long-term, we are trying not to move towards this because we cannot practically guarantee infusion space since we share infusion beds with oncology and BMT.    The compromise that we made today was that we would focus on having a few more medications at home and she would work diligently to use them as needed.  I agreed to move up to 18  tablets a week for the next 5 to 6 weeks, at least through Labor Day, and see if that reduced emergency department visits.  This allows her to have at least 2 oxycodone every day while still allowing for an extra dose a couple times throughout the week.  My hope is that we will see some reduction in the emergency department visits and if so, we may find that there is a sweet spot for pill count at home to get her back to where she was a year or two ago when she was in the emergency department relatively rarely, perhaps monthly.  She is willing to try this and is optimistic.  She has already filled her prescription today, so we will start it next week, or as an alternative, if she needs something before the weeks and, we can give a very short additional prescription to match the future plan.  After that 6-week brittany, upon reevaluation, we will mutually determine the degree of success and plot out the next steps.    HbSS with acute on chronic pain and iron overload  -Continue HU 3000 mg daily  -Monthly crizanlizumab    Pain management  -Mix of pain medications including oxycodone, Robaxin, ibuprofen or naproxen, Tylenol. No more than 2 infusions per week.  Increasing oxycodone quantity to 18 tablets a week, same dose, and continuing to stress primarily as needed use rather than scheduled.  I also stressed that we are not making plans to schedule pain medication infusions as a regular approach because of the lack of space and the difficulty and implementation for everybody.  I mention that I would try to determine the details around what she had heard through communication with others who have sickle cell.  -Continue diligent home management with current medications, heat, rest, compression, warm baths.   -Continue to reassess plan for home oxycodone use monthly.  Continue prescriptions for 12 tablets oxycodone per week (10 mg tablets).  We can continue to decrease weekly if she desires and work jointly to set goals.   We are aiming to avoid any dose or quantity escalations which she continues to be successful with.   --If unable to manage at home can go to ED  with continued plan to use IV Dilaudid and take a break from ketamine (10/2/23). No PCA use and goal for any admission would still be to discharge by 5 days or less    Iron Overload  A Ferriscan was performed 1/30/25 showing a decrease in her average iron concentration, now 24 mg/g dry tissue, previously 31.8. Repeat Ferriscan 7/14 with slight increase in average LIC (25.6 mg/g dry tissue).  She remains on Jadenu 1440 mg daily and deferiprone 2000 mg BID which we will continue.    Stroke Hx  Holding off on chronic transfusions right now per mutually agreed upon management. Trying to reduce iron further before considering restart.  -Still on ASA    Mental Health  Routinely following with therapy and health psychology. Finds this very beneficial, particularly the therapy part as she connects very well with the therapist, and she plans to continue both.    Health maintenance  Up-to-date    Plan:   Follow-up 8/15 as planned with JOCELYN Mantilla.        Eric Duncan MD  Classical Hematologist  Division of Hematology, Oncology, and Transplantation  [facility]  [facility]      39 minutes spent by me on the date of the encounter doing chart review, history and exam, documentation and further activities per the note    ---  The longitudinal plan of care for the diagnosis(es)/condition(s) as documented were addressed during this visit. Due to the added complexity in care, I will continue to support Jennifer in the subsequent management and with ongoing continuity of care.

## 2025-07-23 ENCOUNTER — NURSE TRIAGE (OUTPATIENT)
Dept: ONCOLOGY | Facility: CLINIC | Age: 25
End: 2025-07-23

## 2025-07-23 ENCOUNTER — PATIENT OUTREACH (OUTPATIENT)
Dept: CARE COORDINATION | Facility: CLINIC | Age: 25
End: 2025-07-23

## 2025-07-23 ENCOUNTER — INFUSION THERAPY VISIT (OUTPATIENT)
Dept: INFUSION THERAPY | Facility: CLINIC | Age: 25
End: 2025-07-23
Attending: PEDIATRICS
Payer: MEDICAID

## 2025-07-23 VITALS
RESPIRATION RATE: 16 BRPM | TEMPERATURE: 98.5 F | OXYGEN SATURATION: 91 % | DIASTOLIC BLOOD PRESSURE: 71 MMHG | HEART RATE: 98 BPM | SYSTOLIC BLOOD PRESSURE: 127 MMHG

## 2025-07-23 DIAGNOSIS — G81.10 SPASTIC HEMIPLEGIA, UNSPECIFIED ETIOLOGY, UNSPECIFIED LATERALITY (H): ICD-10-CM

## 2025-07-23 DIAGNOSIS — D57.00 SICKLE CELL PAIN CRISIS (H): Primary | ICD-10-CM

## 2025-07-23 PROCEDURE — 96376 TX/PRO/DX INJ SAME DRUG ADON: CPT

## 2025-07-23 PROCEDURE — 96374 THER/PROPH/DIAG INJ IV PUSH: CPT

## 2025-07-23 PROCEDURE — 96375 TX/PRO/DX INJ NEW DRUG ADDON: CPT

## 2025-07-23 PROCEDURE — 258N000003 HC RX IP 258 OP 636: Performed by: PEDIATRICS

## 2025-07-23 PROCEDURE — 96361 HYDRATE IV INFUSION ADD-ON: CPT

## 2025-07-23 PROCEDURE — 250N000011 HC RX IP 250 OP 636: Performed by: PEDIATRICS

## 2025-07-23 PROCEDURE — 250N000013 HC RX MED GY IP 250 OP 250 PS 637: Performed by: PEDIATRICS

## 2025-07-23 RX ORDER — ONDANSETRON 2 MG/ML
8 INJECTION INTRAMUSCULAR; INTRAVENOUS EVERY 6 HOURS PRN
Status: CANCELLED
Start: 2025-08-01

## 2025-07-23 RX ORDER — HEPARIN SODIUM (PORCINE) LOCK FLUSH IV SOLN 100 UNIT/ML 100 UNIT/ML
5 SOLUTION INTRAVENOUS
OUTPATIENT
Start: 2025-08-01

## 2025-07-23 RX ORDER — ONDANSETRON 2 MG/ML
8 INJECTION INTRAMUSCULAR; INTRAVENOUS EVERY 6 HOURS PRN
Status: DISCONTINUED | OUTPATIENT
Start: 2025-07-23 | End: 2025-07-23 | Stop reason: HOSPADM

## 2025-07-23 RX ORDER — KETOROLAC TROMETHAMINE 30 MG/ML
30 INJECTION, SOLUTION INTRAMUSCULAR; INTRAVENOUS ONCE
Status: COMPLETED | OUTPATIENT
Start: 2025-07-23 | End: 2025-07-23

## 2025-07-23 RX ORDER — ONDANSETRON 4 MG/1
8 TABLET, FILM COATED ORAL
Status: DISCONTINUED | OUTPATIENT
Start: 2025-07-23 | End: 2025-07-23

## 2025-07-23 RX ORDER — DIPHENHYDRAMINE HCL 25 MG
50 CAPSULE ORAL
Status: COMPLETED | OUTPATIENT
Start: 2025-07-23 | End: 2025-07-23

## 2025-07-23 RX ORDER — HEPARIN SODIUM (PORCINE) LOCK FLUSH IV SOLN 100 UNIT/ML 100 UNIT/ML
5 SOLUTION INTRAVENOUS
Status: DISCONTINUED | OUTPATIENT
Start: 2025-07-23 | End: 2025-07-23 | Stop reason: HOSPADM

## 2025-07-23 RX ORDER — DIPHENHYDRAMINE HCL 25 MG
50 CAPSULE ORAL
Status: CANCELLED
Start: 2025-08-01

## 2025-07-23 RX ORDER — HEPARIN SODIUM,PORCINE 10 UNIT/ML
5 VIAL (ML) INTRAVENOUS
OUTPATIENT
Start: 2025-08-01

## 2025-07-23 RX ORDER — KETOROLAC TROMETHAMINE 30 MG/ML
30 INJECTION, SOLUTION INTRAMUSCULAR; INTRAVENOUS ONCE
Status: CANCELLED
Start: 2025-08-01 | End: 2025-08-01

## 2025-07-23 RX ORDER — ONDANSETRON 4 MG/1
8 TABLET, FILM COATED ORAL
Start: 2025-08-01

## 2025-07-23 RX ADMIN — HYDROMORPHONE HYDROCHLORIDE 1 MG: 1 INJECTION, SOLUTION INTRAMUSCULAR; INTRAVENOUS; SUBCUTANEOUS at 11:50

## 2025-07-23 RX ADMIN — HYDROMORPHONE HYDROCHLORIDE 1 MG: 1 INJECTION, SOLUTION INTRAMUSCULAR; INTRAVENOUS; SUBCUTANEOUS at 12:50

## 2025-07-23 RX ADMIN — Medication 5 ML: at 14:08

## 2025-07-23 RX ADMIN — ONDANSETRON 8 MG: 2 INJECTION INTRAMUSCULAR; INTRAVENOUS at 11:50

## 2025-07-23 RX ADMIN — SODIUM CHLORIDE, SODIUM LACTATE, POTASSIUM CHLORIDE, AND CALCIUM CHLORIDE 1000 ML: .6; .31; .03; .02 INJECTION, SOLUTION INTRAVENOUS at 11:48

## 2025-07-23 RX ADMIN — HYDROMORPHONE HYDROCHLORIDE 1 MG: 1 INJECTION, SOLUTION INTRAMUSCULAR; INTRAVENOUS; SUBCUTANEOUS at 13:51

## 2025-07-23 RX ADMIN — DIPHENHYDRAMINE HYDROCHLORIDE 50 MG: 25 CAPSULE ORAL at 11:43

## 2025-07-23 RX ADMIN — KETOROLAC TROMETHAMINE 30 MG: 30 INJECTION, SOLUTION INTRAMUSCULAR; INTRAVENOUS at 12:45

## 2025-07-23 ASSESSMENT — PAIN SCALES - GENERAL
PAINLEVEL_OUTOF10: SEVERE PAIN (9)
PAINLEVEL_OUTOF10: MODERATE PAIN (5)
PAINLEVEL_OUTOF10: SEVERE PAIN (9)
PAINLEVEL_OUTOF10: MODERATE PAIN (5)
PAINLEVEL_OUTOF10: SEVERE PAIN (9)
PAINLEVEL_OUTOF10: MODERATE PAIN (4)

## 2025-07-23 NOTE — TELEPHONE ENCOUNTER
Oncology Nurse Triage - Sickle Cell Pain Crisis:    Situation: Jennifer  calling about Sickle Cell Pain Crisis, requesting to be added on for IV fluids and pain medicine    Background:     Patient's last infusion was 7/21/2025 ED b/c no appts were available  Last clinic visit date:7/21/2025 Dr. Duncan  Does patient have active treatment plan? Yes    Assessment of Symptoms:  Onset/Duration of symptoms: 1 day    Is it typical sickle cell pain? Yes  Location: back  Character: dull  Intensity: 8/10    Any radiation of pain, numbness, tingling, weakness, warmth, swelling, discoloration of arms or legs?No    Fever? No    Chest Pain Present: No    Shortness of breath: No    Other home therapies tried: HEAT/HEATING PAD and WARM BATH     Last home medication taken and when:Last night at 7:00pm    Any Refills Needed?: No    Does patient have transportation & length of time to get to clinic: Yes for early morning pt had appts at [facility] for other reasons so has ride for 7:40am. With last appt starting at 8:00am. Would need ride assist if after morning appts.       Recommendations:   [id number] Paged Dr. Perla Duncan approved IVF/Pain.     0745 Jennifer called back, due to mix up with cab drive arriving too late, pt cancelled other early morning appointments at the [facility] so would need a ride now if an infusion appt becomes available today.       If you do not hear from the infusion center by 2pm then you will not be able to get in for an infusion today. If symptoms worsen while waiting for call back, and/or you experience fever, chills, SOB, chest pain, cough, n/v, dizziness, numbness, swelling, discoloration of extremities, then seek emergency evaluation in Emergency Department.     Please note, if you are late for your appt, you risk losing your infusion appt as it may delay another patient's infusion who arrived on time.

## 2025-07-23 NOTE — TELEPHONE ENCOUNTER
Call from Jennifer to see if there are openings for infusion at other sites.     0938 call to [facility], spoke w/ Sisi, who states they can take pt at 12pm today. Sisi will add pt on to their schedule.     0941 call to pt to offer 12pm infusion appt in [place]. Pt accepting of this, but will need transportation.     0944 request sent to  to assist with transportation to [facility] and asked they contact pt directly.

## 2025-07-23 NOTE — PROGRESS NOTES
Infusion Nursing Note:  Jennifer Cervantes presents today for IV fluids, pain meds.    Patient seen by provider today: No   present during visit today: Not Applicable.    Note: Patient added on today for sickle cell crisis, rates pain in mid-lower back at 9/10 today. Refer to nurse triage phone encounter from today. No other new concerns to report today. Patient requests toradol dose to be given with second dose of dilaudid. Patient reports significant improvement in pain prior to discharge, rating at 4/10. Patient confirmed she has medical van ride home.       Intravenous Access:  Implanted Port.    Treatment Conditions:  Not Applicable.      Post Infusion Assessment:  Patient tolerated infusion without incident.  Blood return noted pre and post infusion.  Site patent and intact, free from redness, edema or discomfort.  No evidence of extravasations.  Access discontinued per protocol.       Discharge Plan:   Discharge instructions reviewed with: Patient.  Patient and/or family verbalized understanding of discharge instructions and all questions answered.  AVS to patient via [employer].  Patient will return [place] [facility] [place] for next appointment, or as needed per MD.   Patient discharged in stable condition accompanied by: self.  Departure Mode: Ambulatory.      Chasity Packer RN

## 2025-07-23 NOTE — PROGRESS NOTES
Ambulatory Care Management- Transportation Support  Specialty SW - [facility]     Data/Intervention:  Patient Name:    Jennifer Cervantes     /Age: 2000 (25 year old)     CCRC team member assisting Specialty SW with transportation request.      Assessment:  CHW/CTA scheduled on line with [employer] to arrange medical appointment transportation in conjunction with patient's insurance.     Taxi company: [employer]   Booking # [id number] Booking # is [id number]    Patient will need to call above taxi company at phone number: [phone] when ready for return ride home.      Plan:  Patient is aware of the transportation plan.  available to assist with any other needs.      Lisandra Kilgore MA

## 2025-07-24 ENCOUNTER — NURSE TRIAGE (OUTPATIENT)
Dept: ONCOLOGY | Facility: CLINIC | Age: 25
End: 2025-07-24
Payer: MEDICAID

## 2025-07-24 ENCOUNTER — INFUSION THERAPY VISIT (OUTPATIENT)
Dept: TRANSPLANT | Facility: CLINIC | Age: 25
End: 2025-07-24
Attending: REGISTERED NURSE
Payer: MEDICAID

## 2025-07-24 ENCOUNTER — PATIENT OUTREACH (OUTPATIENT)
Dept: CARE COORDINATION | Facility: CLINIC | Age: 25
End: 2025-07-24
Payer: MEDICAID

## 2025-07-24 VITALS
TEMPERATURE: 98.4 F | SYSTOLIC BLOOD PRESSURE: 118 MMHG | DIASTOLIC BLOOD PRESSURE: 82 MMHG | HEART RATE: 96 BPM | RESPIRATION RATE: 16 BRPM | OXYGEN SATURATION: 92 %

## 2025-07-24 DIAGNOSIS — D57.00 SICKLE CELL PAIN CRISIS (H): Primary | ICD-10-CM

## 2025-07-24 DIAGNOSIS — G81.10 SPASTIC HEMIPLEGIA, UNSPECIFIED ETIOLOGY, UNSPECIFIED LATERALITY (H): ICD-10-CM

## 2025-07-24 PROCEDURE — 250N000011 HC RX IP 250 OP 636: Performed by: PEDIATRICS

## 2025-07-24 PROCEDURE — 250N000011 HC RX IP 250 OP 636: Performed by: REGISTERED NURSE

## 2025-07-24 PROCEDURE — 250N000013 HC RX MED GY IP 250 OP 250 PS 637: Performed by: PEDIATRICS

## 2025-07-24 PROCEDURE — 258N000003 HC RX IP 258 OP 636: Performed by: PEDIATRICS

## 2025-07-24 RX ORDER — HEPARIN SODIUM (PORCINE) LOCK FLUSH IV SOLN 100 UNIT/ML 100 UNIT/ML
5 SOLUTION INTRAVENOUS ONCE
Status: COMPLETED | OUTPATIENT
Start: 2025-07-24 | End: 2025-07-24

## 2025-07-24 RX ORDER — ONDANSETRON 2 MG/ML
8 INJECTION INTRAMUSCULAR; INTRAVENOUS EVERY 6 HOURS PRN
Status: DISCONTINUED | OUTPATIENT
Start: 2025-07-24 | End: 2025-07-24 | Stop reason: HOSPADM

## 2025-07-24 RX ORDER — HEPARIN SODIUM (PORCINE) LOCK FLUSH IV SOLN 100 UNIT/ML 100 UNIT/ML
5 SOLUTION INTRAVENOUS
OUTPATIENT
Start: 2025-08-01

## 2025-07-24 RX ORDER — DIPHENHYDRAMINE HCL 25 MG
50 CAPSULE ORAL
Start: 2025-08-01

## 2025-07-24 RX ORDER — KETOROLAC TROMETHAMINE 30 MG/ML
30 INJECTION, SOLUTION INTRAMUSCULAR; INTRAVENOUS ONCE
Status: COMPLETED | OUTPATIENT
Start: 2025-07-24 | End: 2025-07-24

## 2025-07-24 RX ORDER — ONDANSETRON 2 MG/ML
8 INJECTION INTRAMUSCULAR; INTRAVENOUS EVERY 6 HOURS PRN
Start: 2025-08-01

## 2025-07-24 RX ORDER — ONDANSETRON 4 MG/1
8 TABLET, FILM COATED ORAL
Start: 2025-08-01

## 2025-07-24 RX ORDER — HEPARIN SODIUM,PORCINE 10 UNIT/ML
5 VIAL (ML) INTRAVENOUS
OUTPATIENT
Start: 2025-08-01

## 2025-07-24 RX ORDER — KETOROLAC TROMETHAMINE 30 MG/ML
30 INJECTION, SOLUTION INTRAMUSCULAR; INTRAVENOUS ONCE
Start: 2025-08-01 | End: 2025-08-01

## 2025-07-24 RX ORDER — DIPHENHYDRAMINE HCL 25 MG
50 CAPSULE ORAL
Status: COMPLETED | OUTPATIENT
Start: 2025-07-24 | End: 2025-07-24

## 2025-07-24 RX ADMIN — DIPHENHYDRAMINE HYDROCHLORIDE 50 MG: 25 CAPSULE ORAL at 13:08

## 2025-07-24 RX ADMIN — HYDROMORPHONE HYDROCHLORIDE 1 MG: 1 INJECTION, SOLUTION INTRAMUSCULAR; INTRAVENOUS; SUBCUTANEOUS at 13:08

## 2025-07-24 RX ADMIN — KETOROLAC TROMETHAMINE 30 MG: 30 INJECTION, SOLUTION INTRAMUSCULAR; INTRAVENOUS at 13:08

## 2025-07-24 RX ADMIN — HYDROMORPHONE HYDROCHLORIDE 1 MG: 1 INJECTION, SOLUTION INTRAMUSCULAR; INTRAVENOUS; SUBCUTANEOUS at 14:06

## 2025-07-24 RX ADMIN — SODIUM CHLORIDE, SODIUM LACTATE, POTASSIUM CHLORIDE, AND CALCIUM CHLORIDE 1000 ML: .6; .31; .03; .02 INJECTION, SOLUTION INTRAVENOUS at 13:08

## 2025-07-24 RX ADMIN — Medication 5 ML: at 15:10

## 2025-07-24 RX ADMIN — HYDROMORPHONE HYDROCHLORIDE 1 MG: 1 INJECTION, SOLUTION INTRAMUSCULAR; INTRAVENOUS; SUBCUTANEOUS at 15:10

## 2025-07-24 RX ADMIN — ONDANSETRON 8 MG: 2 INJECTION INTRAMUSCULAR; INTRAVENOUS at 13:08

## 2025-07-24 ASSESSMENT — PAIN SCALES - GENERAL: PAINLEVEL_OUTOF10: SEVERE PAIN (8)

## 2025-07-24 NOTE — TELEPHONE ENCOUNTER
Oncology Nurse Triage - Sickle Cell Pain Crisis:    Situation: Jennifer  calling about Sickle Cell Pain Crisis, requesting to be added on for IV fluids and pain medicine    Background:     Patient's last infusion was 07/23/25  Last clinic visit date:7/21/2025 Dr. Duncan   Does patient have active treatment plan? Yes    Assessment of Symptoms:  Onset/Duration of symptoms: 2 day    Is it typical sickle cell pain? Yes  Location: Back  Character: Sharp  Intensity: 7/10    Any radiation of pain, numbness, tingling, weakness, warmth, swelling, discoloration of arms or legs?No    Fever? No    Chest Pain Present: No    Shortness of breath: No    Other home therapies tried: HEAT/HEATING PAD and WARM BATH     Last home medication taken and when: 11pm naproxen    Any Refills Needed?: No    Does patient have transportation & length of time to get to clinic: No, would need assistance with transportation.       Recommendations:   Added to infusion wait list for IVF/pain meds per protocol.    If you do not hear from the infusion center by 2pm then you will not be able to get in for an infusion today. If symptoms worsen while waiting for call back, and/or you experience fever, chills, SOB, chest pain, cough, n/v, dizziness, numbness, swelling, discoloration of extremities, then seek emergency evaluation in Emergency Department.

## 2025-07-24 NOTE — PROGRESS NOTES
Infusion Nursing Note:  Jennifer Cervantes presents today for add-on infusion.    Patient seen by provider today: No   present during visit today: Not Applicable.    Note: Patient received dilaudid x3, toradol, zofran, benadryl and 1 L LR bolus for 8/10 back pain with some relief. Stable prior to discharge.      Intravenous Access:  Implanted Port.    Treatment Conditions:  Results reviewed, labs MET treatment parameters, ok to proceed with treatment.      Post Infusion Assessment:  Patient tolerated infusion without incident.       Discharge Plan:   Patient and/or family verbalized understanding of discharge instructions and all questions answered.      Vicenta Boone RN

## 2025-07-24 NOTE — TELEPHONE ENCOUNTER
Available appt at 1300 with BMT. Call placed to pt and confirmed she is able to make appt.     Message sent to SW to help with transportation and CCOD to add on to schedule.

## 2025-07-24 NOTE — PROGRESS NOTES
Ambulatory Care Management- Transportation Support  Oncology Clinic     Data/Intervention:  Patient Name: Jennifer Cervantes     /Age: 2000 (24 year old)     CCRC team member collaborated with following Oncology SW regarding this request: From Nurse Triage for transportation      Assessment:  CHW/CTA called [employer] to arrange medical appointment transportation for a will call ride home. Patient will need to call when ready for return ride home [phone] for a pickup.   Plan:  Talked to patient about the transportation plan.  available to assist with any other needs.         Isaura OTTO Community Health Worker  Clinic Care Coordination  [facility]  Phone: [phone]

## 2025-07-25 ENCOUNTER — HOSPITAL ENCOUNTER (EMERGENCY)
Facility: CLINIC | Age: 25
Discharge: HOME | End: 2025-07-26
Attending: EMERGENCY MEDICINE | Admitting: EMERGENCY MEDICINE
Payer: MEDICAID

## 2025-07-25 DIAGNOSIS — D57.00 SICKLE CELL PAIN CRISIS (H): Primary | ICD-10-CM

## 2025-07-25 DIAGNOSIS — D57.00 SICKLE CELL DISEASE WITH CRISIS (H): ICD-10-CM

## 2025-07-25 PROCEDURE — 99285 EMERGENCY DEPT VISIT HI MDM: CPT | Performed by: EMERGENCY MEDICINE

## 2025-07-25 PROCEDURE — 99284 EMERGENCY DEPT VISIT MOD MDM: CPT | Mod: 25 | Performed by: EMERGENCY MEDICINE

## 2025-07-25 RX ORDER — ONDANSETRON 2 MG/ML
4 INJECTION INTRAMUSCULAR; INTRAVENOUS ONCE
Status: COMPLETED | OUTPATIENT
Start: 2025-07-25 | End: 2025-07-26

## 2025-07-25 RX ORDER — ALBUTEROL SULFATE 0.83 MG/ML
2.5 SOLUTION RESPIRATORY (INHALATION) ONCE
Status: COMPLETED | OUTPATIENT
Start: 2025-07-25 | End: 2025-07-26

## 2025-07-25 RX ORDER — KETOROLAC TROMETHAMINE 30 MG/ML
30 INJECTION, SOLUTION INTRAMUSCULAR; INTRAVENOUS ONCE
Status: COMPLETED | OUTPATIENT
Start: 2025-07-25 | End: 2025-07-26

## 2025-07-25 RX ORDER — OXYCODONE HYDROCHLORIDE 10 MG/1
10 TABLET ORAL EVERY 6 HOURS PRN
Qty: 18 TABLET | Refills: 0 | OUTPATIENT
Start: 2025-07-25

## 2025-07-25 NOTE — TELEPHONE ENCOUNTER
Narcotic Refill Request    Medication(s) requested:  oxydodone   Person Requesting Refill:jennifer pt  What pain is the medication treating: sickle cell pain, back today  How is the medication being taken?:back  Does pt have enough for today? yes  Is pain being adequately controlled on the current regimen?: okay, requires intermittent iVF/pain a few times a week  Experiencing any side effects from medication?: denies    Date of most recent appointment:  7/21/2025 Jennifer Krishnamurthy states that it was discussed that may increase refill amount to 18tablets per fill.   Any No Show Visits:none recently  Next appointment:   8/15/2025 Patricia Mantilla   Last fill date and by whom:  Patricia Mantilla on 7/18/2025   Pt is aware for Monday   Reviewed: YES      Send to provider: Dr Duncan and RNCC Arely

## 2025-07-26 VITALS
DIASTOLIC BLOOD PRESSURE: 84 MMHG | OXYGEN SATURATION: 90 % | WEIGHT: 159.9 LBS | SYSTOLIC BLOOD PRESSURE: 127 MMHG | RESPIRATION RATE: 16 BRPM | HEART RATE: 71 BPM | TEMPERATURE: 98.4 F | HEIGHT: 64 IN | BODY MASS INDEX: 27.3 KG/M2

## 2025-07-26 LAB
ANION GAP SERPL CALCULATED.3IONS-SCNC: 11 MMOL/L (ref 7–15)
BASOPHILS # BLD AUTO: 0.3 10E3/UL (ref 0–0.2)
BASOPHILS # BLD AUTO: 0.3 10E3/UL (ref 0–0.2)
BASOPHILS NFR BLD AUTO: 2 %
BASOPHILS NFR BLD AUTO: 3 %
BUN SERPL-MCNC: 7 MG/DL (ref 6–20)
CALCIUM SERPL-MCNC: 8.5 MG/DL (ref 8.8–10.4)
CHLORIDE SERPL-SCNC: 106 MMOL/L (ref 98–107)
CHOLEST SERPL-MCNC: 130 MG/DL
CREAT SERPL-MCNC: 0.67 MG/DL (ref 0.51–0.95)
EGFRCR SERPLBLD CKD-EPI 2021: >90 ML/MIN/1.73M2
EOSINOPHIL # BLD AUTO: 0.5 10E3/UL (ref 0–0.7)
EOSINOPHIL # BLD AUTO: 0.5 10E3/UL (ref 0–0.7)
EOSINOPHIL NFR BLD AUTO: 4 %
EOSINOPHIL NFR BLD AUTO: 4 %
ERYTHROCYTE [DISTWIDTH] IN BLOOD BY AUTOMATED COUNT: 25.2 % (ref 10–15)
ERYTHROCYTE [DISTWIDTH] IN BLOOD BY AUTOMATED COUNT: 26.1 % (ref 10–15)
GLUCOSE SERPL-MCNC: 97 MG/DL (ref 70–99)
HCO3 SERPL-SCNC: 22 MMOL/L (ref 22–29)
HCT VFR BLD AUTO: 17.7 % (ref 35–47)
HCT VFR BLD AUTO: 19 % (ref 35–47)
HDLC SERPL-MCNC: 30 MG/DL
HGB BLD-MCNC: ABNORMAL G/DL
HGB BLD-MCNC: ABNORMAL G/DL
IMM GRANULOCYTES # BLD: 0 10E3/UL
IMM GRANULOCYTES # BLD: 0 10E3/UL
IMM GRANULOCYTES NFR BLD: 0 %
IMM GRANULOCYTES NFR BLD: 0 %
LDLC SERPL CALC-MCNC: 36 MG/DL
LYMPHOCYTES # BLD AUTO: 2.7 10E3/UL (ref 0.8–5.3)
LYMPHOCYTES # BLD AUTO: 3.1 10E3/UL (ref 0.8–5.3)
LYMPHOCYTES NFR BLD AUTO: 26 %
LYMPHOCYTES NFR BLD AUTO: 26 %
MCH RBC QN AUTO: ABNORMAL PG
MCH RBC QN AUTO: ABNORMAL PG
MCHC RBC AUTO-ENTMCNC: ABNORMAL G/DL
MCHC RBC AUTO-ENTMCNC: ABNORMAL G/DL
MCV RBC AUTO: 87 FL (ref 78–100)
MCV RBC AUTO: 88 FL (ref 78–100)
MONOCYTES # BLD AUTO: 1 10E3/UL (ref 0–1.3)
MONOCYTES # BLD AUTO: 1.1 10E3/UL (ref 0–1.3)
MONOCYTES NFR BLD AUTO: 10 %
MONOCYTES NFR BLD AUTO: 9 %
NEUTROPHILS # BLD AUTO: 5.9 10E3/UL (ref 1.6–8.3)
NEUTROPHILS # BLD AUTO: 7.1 10E3/UL (ref 1.6–8.3)
NEUTROPHILS NFR BLD AUTO: 56 %
NEUTROPHILS NFR BLD AUTO: 59 %
NONHDLC SERPL-MCNC: 100 MG/DL
NRBC # BLD AUTO: 0.5 10E3/UL
NRBC # BLD AUTO: 0.5 10E3/UL
NRBC BLD AUTO-RTO: 4 /100
NRBC BLD AUTO-RTO: 5 /100
PLAT MORPH BLD: ABNORMAL
PLAT MORPH BLD: ABNORMAL
PLATELET # BLD AUTO: 367 10E3/UL (ref 150–450)
PLATELET # BLD AUTO: 376 10E3/UL (ref 150–450)
POLYCHROMASIA BLD QL SMEAR: SLIGHT
POLYCHROMASIA BLD QL SMEAR: SLIGHT
POTASSIUM SERPL-SCNC: 3.9 MMOL/L (ref 3.4–5.3)
RBC # BLD AUTO: 2.03 10E6/UL (ref 3.8–5.2)
RBC # BLD AUTO: 2.17 10E6/UL (ref 3.8–5.2)
RBC MORPH BLD: ABNORMAL
RBC MORPH BLD: ABNORMAL
RETICS # AUTO: 0.55 10E6/UL (ref 0.03–0.1)
RETICS/RBC NFR AUTO: 25.1 % (ref 0.5–2)
SICKLE CELLS BLD QL SMEAR: ABNORMAL
SICKLE CELLS BLD QL SMEAR: ABNORMAL
SODIUM SERPL-SCNC: 139 MMOL/L (ref 135–145)
TARGETS BLD QL SMEAR: SLIGHT
TARGETS BLD QL SMEAR: SLIGHT
TRIGL SERPL-MCNC: 318 MG/DL
WBC # BLD AUTO: 10.5 10E3/UL (ref 4–11)
WBC # BLD AUTO: 12 10E3/UL (ref 4–11)

## 2025-07-26 PROCEDURE — 85045 AUTOMATED RETICULOCYTE COUNT: CPT | Performed by: EMERGENCY MEDICINE

## 2025-07-26 PROCEDURE — 96374 THER/PROPH/DIAG INJ IV PUSH: CPT | Performed by: EMERGENCY MEDICINE

## 2025-07-26 PROCEDURE — 96361 HYDRATE IV INFUSION ADD-ON: CPT | Performed by: EMERGENCY MEDICINE

## 2025-07-26 PROCEDURE — 80048 BASIC METABOLIC PNL TOTAL CA: CPT | Performed by: EMERGENCY MEDICINE

## 2025-07-26 PROCEDURE — 96375 TX/PRO/DX INJ NEW DRUG ADDON: CPT | Performed by: EMERGENCY MEDICINE

## 2025-07-26 PROCEDURE — 96376 TX/PRO/DX INJ SAME DRUG ADON: CPT | Performed by: EMERGENCY MEDICINE

## 2025-07-26 PROCEDURE — 80061 LIPID PANEL: CPT | Performed by: EMERGENCY MEDICINE

## 2025-07-26 PROCEDURE — 85048 AUTOMATED LEUKOCYTE COUNT: CPT | Performed by: EMERGENCY MEDICINE

## 2025-07-26 PROCEDURE — 250N000009 HC RX 250: Performed by: EMERGENCY MEDICINE

## 2025-07-26 PROCEDURE — 250N000011 HC RX IP 250 OP 636: Performed by: EMERGENCY MEDICINE

## 2025-07-26 PROCEDURE — 36415 COLL VENOUS BLD VENIPUNCTURE: CPT | Performed by: EMERGENCY MEDICINE

## 2025-07-26 PROCEDURE — 258N000003 HC RX IP 258 OP 636: Performed by: EMERGENCY MEDICINE

## 2025-07-26 RX ORDER — HEPARIN SODIUM (PORCINE) LOCK FLUSH IV SOLN 100 UNIT/ML 100 UNIT/ML
5-10 SOLUTION INTRAVENOUS
Status: DISCONTINUED | OUTPATIENT
Start: 2025-07-26 | End: 2025-07-26 | Stop reason: HOSPADM

## 2025-07-26 RX ADMIN — ONDANSETRON 4 MG: 2 INJECTION INTRAMUSCULAR; INTRAVENOUS at 00:25

## 2025-07-26 RX ADMIN — SODIUM CHLORIDE, SODIUM LACTATE, POTASSIUM CHLORIDE, AND CALCIUM CHLORIDE 1000 ML: .6; .31; .03; .02 INJECTION, SOLUTION INTRAVENOUS at 00:30

## 2025-07-26 RX ADMIN — HYDROMORPHONE HYDROCHLORIDE 1 MG: 1 INJECTION, SOLUTION INTRAMUSCULAR; INTRAVENOUS; SUBCUTANEOUS at 02:23

## 2025-07-26 RX ADMIN — ALBUTEROL SULFATE 2.5 MG: 2.5 SOLUTION RESPIRATORY (INHALATION) at 00:34

## 2025-07-26 RX ADMIN — KETOROLAC TROMETHAMINE 30 MG: 30 INJECTION, SOLUTION INTRAMUSCULAR at 00:25

## 2025-07-26 RX ADMIN — HYDROMORPHONE HYDROCHLORIDE 1 MG: 1 INJECTION, SOLUTION INTRAMUSCULAR; INTRAVENOUS; SUBCUTANEOUS at 00:16

## 2025-07-26 RX ADMIN — HEPARIN 5 ML: 100 SYRINGE at 04:25

## 2025-07-26 RX ADMIN — HYDROMORPHONE HYDROCHLORIDE 1 MG: 1 INJECTION, SOLUTION INTRAMUSCULAR; INTRAVENOUS; SUBCUTANEOUS at 01:18

## 2025-07-26 ASSESSMENT — ACTIVITIES OF DAILY LIVING (ADL)
ADLS_ACUITY_SCORE: 58

## 2025-07-26 NOTE — DISCHARGE INSTRUCTIONS
Thank you for coming to the [facility] Emergency Department.     Please follow up on Monday with Dr. Duncan or the nurse in the Hematology clinic. This is to check on the results of your lipid panel done today, and discuss when they would like you to have your CBC repeated. We were unable to get results of this test in the ER today.

## 2025-07-26 NOTE — ED NOTES
Lab notified this nurse that hgb, MCH, MCHC could not be processed due to high lipid content MD notified new lab ordered.
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
## 2025-07-26 NOTE — ED TRIAGE NOTES
Patient presents with sickle cell crisis with pain to the left back/arm. Patient took meds at home and pain continues to increase. Patient also reports increasing asthma symptoms since changing inhaler.

## 2025-07-28 ENCOUNTER — NURSE TRIAGE (OUTPATIENT)
Dept: ONCOLOGY | Facility: CLINIC | Age: 25
End: 2025-07-28

## 2025-07-28 ENCOUNTER — APPOINTMENT (OUTPATIENT)
Dept: GENERAL RADIOLOGY | Facility: CLINIC | Age: 25
End: 2025-07-28
Attending: STUDENT IN AN ORGANIZED HEALTH CARE EDUCATION/TRAINING PROGRAM
Payer: MEDICAID

## 2025-07-28 ENCOUNTER — HOSPITAL ENCOUNTER (EMERGENCY)
Facility: CLINIC | Age: 25
Discharge: HOME | End: 2025-07-28
Attending: FAMILY MEDICINE | Admitting: FAMILY MEDICINE
Payer: MEDICAID

## 2025-07-28 ENCOUNTER — THERAPY VISIT (OUTPATIENT)
Dept: PHYSICAL THERAPY | Facility: CLINIC | Age: 25
End: 2025-07-28
Payer: MEDICAID

## 2025-07-28 VITALS
RESPIRATION RATE: 16 BRPM | OXYGEN SATURATION: 97 % | SYSTOLIC BLOOD PRESSURE: 126 MMHG | DIASTOLIC BLOOD PRESSURE: 83 MMHG | HEART RATE: 91 BPM | TEMPERATURE: 98.4 F | WEIGHT: 159 LBS | BODY MASS INDEX: 27.29 KG/M2

## 2025-07-28 DIAGNOSIS — D57.00 SICKLE CELL DISEASE WITH CRISIS (H): ICD-10-CM

## 2025-07-28 DIAGNOSIS — D57.1 HB-SS DISEASE WITHOUT CRISIS (H): ICD-10-CM

## 2025-07-28 DIAGNOSIS — S69.92XA WRIST INJURY, LEFT, INITIAL ENCOUNTER: Primary | ICD-10-CM

## 2025-07-28 DIAGNOSIS — E78.1 HYPERTRIGLYCERIDEMIA: Primary | ICD-10-CM

## 2025-07-28 DIAGNOSIS — D57.00 SICKLE CELL DISEASE WITH CRISIS (H): Primary | ICD-10-CM

## 2025-07-28 DIAGNOSIS — M25.562 ACUTE PAIN OF LEFT KNEE: Primary | ICD-10-CM

## 2025-07-28 LAB
ALBUMIN SERPL BCG-MCNC: 4.6 G/DL (ref 3.5–5.2)
ALP SERPL-CCNC: 64 U/L (ref 40–150)
ALT SERPL W P-5'-P-CCNC: 51 U/L (ref 0–50)
ANION GAP SERPL CALCULATED.3IONS-SCNC: 17 MMOL/L (ref 7–15)
AST SERPL W P-5'-P-CCNC: 60 U/L (ref 0–45)
BASOPHILS # BLD AUTO: 0.2 10E3/UL (ref 0–0.2)
BASOPHILS NFR BLD AUTO: 2 %
BILIRUB SERPL-MCNC: 3.5 MG/DL
BUN SERPL-MCNC: 9.3 MG/DL (ref 6–20)
CALCIUM SERPL-MCNC: 9 MG/DL (ref 8.8–10.4)
CHLORIDE SERPL-SCNC: 105 MMOL/L (ref 98–107)
CREAT SERPL-MCNC: 0.53 MG/DL (ref 0.51–0.95)
EGFRCR SERPLBLD CKD-EPI 2021: >90 ML/MIN/1.73M2
EOSINOPHIL # BLD AUTO: 0.4 10E3/UL (ref 0–0.7)
EOSINOPHIL NFR BLD AUTO: 4 %
ERYTHROCYTE [DISTWIDTH] IN BLOOD BY AUTOMATED COUNT: ABNORMAL %
GLUCOSE SERPL-MCNC: 115 MG/DL (ref 70–99)
HCG SERPL QL: NEGATIVE
HCO3 SERPL-SCNC: 18 MMOL/L (ref 22–29)
HCT VFR BLD AUTO: 20.3 % (ref 35–47)
HGB BLD-MCNC: 7.2 G/DL (ref 11.7–15.7)
IMM GRANULOCYTES # BLD: 0.1 10E3/UL
IMM GRANULOCYTES NFR BLD: 0 %
LYMPHOCYTES # BLD AUTO: 1.6 10E3/UL (ref 0.8–5.3)
LYMPHOCYTES NFR BLD AUTO: 14 %
MCH RBC QN AUTO: 31.7 PG (ref 26.5–33)
MCHC RBC AUTO-ENTMCNC: 35.5 G/DL (ref 31.5–36.5)
MCV RBC AUTO: 89 FL (ref 78–100)
MONOCYTES # BLD AUTO: 0.7 10E3/UL (ref 0–1.3)
MONOCYTES NFR BLD AUTO: 6 %
NEUTROPHILS # BLD AUTO: 8.3 10E3/UL (ref 1.6–8.3)
NEUTROPHILS NFR BLD AUTO: 74 %
NRBC # BLD AUTO: 0.6 10E3/UL
NRBC BLD AUTO-RTO: 5 /100
PLAT MORPH BLD: ABNORMAL
PLATELET # BLD AUTO: 427 10E3/UL (ref 150–450)
POLYCHROMASIA BLD QL SMEAR: SLIGHT
POTASSIUM SERPL-SCNC: 3.5 MMOL/L (ref 3.4–5.3)
PROT SERPL-MCNC: 7.7 G/DL (ref 6.4–8.3)
RBC # BLD AUTO: 2.27 10E6/UL (ref 3.8–5.2)
RBC MORPH BLD: ABNORMAL
RETICS # AUTO: 0.54 10E6/UL (ref 0.03–0.1)
RETICS/RBC NFR AUTO: 24.3 % (ref 0.5–2)
SICKLE CELLS BLD QL SMEAR: ABNORMAL
SODIUM SERPL-SCNC: 140 MMOL/L (ref 135–145)
TARGETS BLD QL SMEAR: SLIGHT
WBC # BLD AUTO: 11.3 10E3/UL (ref 4–11)

## 2025-07-28 PROCEDURE — 99285 EMERGENCY DEPT VISIT HI MDM: CPT | Mod: FS | Performed by: FAMILY MEDICINE

## 2025-07-28 PROCEDURE — 96361 HYDRATE IV INFUSION ADD-ON: CPT | Performed by: FAMILY MEDICINE

## 2025-07-28 PROCEDURE — 71046 X-RAY EXAM CHEST 2 VIEWS: CPT

## 2025-07-28 PROCEDURE — 84703 CHORIONIC GONADOTROPIN ASSAY: CPT | Performed by: STUDENT IN AN ORGANIZED HEALTH CARE EDUCATION/TRAINING PROGRAM

## 2025-07-28 PROCEDURE — 96374 THER/PROPH/DIAG INJ IV PUSH: CPT | Performed by: FAMILY MEDICINE

## 2025-07-28 PROCEDURE — 97110 THERAPEUTIC EXERCISES: CPT | Mod: GP

## 2025-07-28 PROCEDURE — 85045 AUTOMATED RETICULOCYTE COUNT: CPT | Performed by: STUDENT IN AN ORGANIZED HEALTH CARE EDUCATION/TRAINING PROGRAM

## 2025-07-28 PROCEDURE — 99284 EMERGENCY DEPT VISIT MOD MDM: CPT | Mod: 25 | Performed by: FAMILY MEDICINE

## 2025-07-28 PROCEDURE — 250N000011 HC RX IP 250 OP 636: Performed by: STUDENT IN AN ORGANIZED HEALTH CARE EDUCATION/TRAINING PROGRAM

## 2025-07-28 PROCEDURE — 258N000003 HC RX IP 258 OP 636: Performed by: STUDENT IN AN ORGANIZED HEALTH CARE EDUCATION/TRAINING PROGRAM

## 2025-07-28 PROCEDURE — 96375 TX/PRO/DX INJ NEW DRUG ADDON: CPT | Performed by: FAMILY MEDICINE

## 2025-07-28 PROCEDURE — 96376 TX/PRO/DX INJ SAME DRUG ADON: CPT | Performed by: FAMILY MEDICINE

## 2025-07-28 PROCEDURE — 36415 COLL VENOUS BLD VENIPUNCTURE: CPT | Performed by: STUDENT IN AN ORGANIZED HEALTH CARE EDUCATION/TRAINING PROGRAM

## 2025-07-28 PROCEDURE — 82374 ASSAY BLOOD CARBON DIOXIDE: CPT | Performed by: STUDENT IN AN ORGANIZED HEALTH CARE EDUCATION/TRAINING PROGRAM

## 2025-07-28 PROCEDURE — 85025 COMPLETE CBC W/AUTO DIFF WBC: CPT | Performed by: STUDENT IN AN ORGANIZED HEALTH CARE EDUCATION/TRAINING PROGRAM

## 2025-07-28 PROCEDURE — 97530 THERAPEUTIC ACTIVITIES: CPT | Mod: GP

## 2025-07-28 RX ORDER — HEPARIN SODIUM,PORCINE 10 UNIT/ML
5-10 VIAL (ML) INTRAVENOUS EVERY 24 HOURS
Status: DISCONTINUED | OUTPATIENT
Start: 2025-07-28 | End: 2025-07-28 | Stop reason: HOSPADM

## 2025-07-28 RX ORDER — HEPARIN SODIUM (PORCINE) LOCK FLUSH IV SOLN 100 UNIT/ML 100 UNIT/ML
5-10 SOLUTION INTRAVENOUS
Status: DISCONTINUED | OUTPATIENT
Start: 2025-07-28 | End: 2025-07-28 | Stop reason: HOSPADM

## 2025-07-28 RX ORDER — OXYCODONE HYDROCHLORIDE 10 MG/1
10 TABLET ORAL EVERY 6 HOURS PRN
Qty: 18 TABLET | Refills: 0 | Status: SHIPPED | OUTPATIENT
Start: 2025-07-28 | End: 2025-07-28

## 2025-07-28 RX ORDER — KETOROLAC TROMETHAMINE 30 MG/ML
30 INJECTION, SOLUTION INTRAMUSCULAR; INTRAVENOUS ONCE
Status: COMPLETED | OUTPATIENT
Start: 2025-07-28 | End: 2025-07-28

## 2025-07-28 RX ORDER — ONDANSETRON 2 MG/ML
8 INJECTION INTRAMUSCULAR; INTRAVENOUS EVERY 6 HOURS PRN
Status: DISCONTINUED | OUTPATIENT
Start: 2025-07-28 | End: 2025-07-28 | Stop reason: HOSPADM

## 2025-07-28 RX ORDER — SODIUM CHLORIDE, SODIUM LACTATE, POTASSIUM CHLORIDE, CALCIUM CHLORIDE 600; 310; 30; 20 MG/100ML; MG/100ML; MG/100ML; MG/100ML
INJECTION, SOLUTION INTRAVENOUS CONTINUOUS
Status: DISCONTINUED | OUTPATIENT
Start: 2025-07-28 | End: 2025-07-28 | Stop reason: HOSPADM

## 2025-07-28 RX ORDER — OXYCODONE HYDROCHLORIDE 10 MG/1
10 TABLET ORAL EVERY 6 HOURS PRN
Qty: 18 TABLET | Refills: 0 | Status: SHIPPED | OUTPATIENT
Start: 2025-07-28

## 2025-07-28 RX ADMIN — HEPARIN 5 ML: 100 SYRINGE at 16:14

## 2025-07-28 RX ADMIN — KETOROLAC TROMETHAMINE 30 MG: 30 INJECTION, SOLUTION INTRAMUSCULAR at 12:40

## 2025-07-28 RX ADMIN — SODIUM CHLORIDE, SODIUM LACTATE, POTASSIUM CHLORIDE, AND CALCIUM CHLORIDE: .6; .31; .03; .02 INJECTION, SOLUTION INTRAVENOUS at 12:41

## 2025-07-28 RX ADMIN — HYDROMORPHONE HYDROCHLORIDE 1 MG: 1 INJECTION, SOLUTION INTRAMUSCULAR; INTRAVENOUS; SUBCUTANEOUS at 13:48

## 2025-07-28 RX ADMIN — HYDROMORPHONE HYDROCHLORIDE 1 MG: 1 INJECTION, SOLUTION INTRAMUSCULAR; INTRAVENOUS; SUBCUTANEOUS at 12:34

## 2025-07-28 RX ADMIN — ONDANSETRON 8 MG: 2 INJECTION INTRAMUSCULAR; INTRAVENOUS at 12:36

## 2025-07-28 RX ADMIN — HYDROMORPHONE HYDROCHLORIDE 1 MG: 1 INJECTION, SOLUTION INTRAMUSCULAR; INTRAVENOUS; SUBCUTANEOUS at 14:47

## 2025-07-28 ASSESSMENT — ACTIVITIES OF DAILY LIVING (ADL)
ADLS_ACUITY_SCORE: 58

## 2025-07-28 NOTE — ED NOTES
Patient given ride to discharge pharmacy and then home via taxi.  Patient alert and oriented and ambulating without difficulty.

## 2025-07-28 NOTE — TELEPHONE ENCOUNTER
Oncology Nurse Triage - Sickle Cell Pain Crisis:    Situation: Jennifer  calling about Sickle Cell Pain Crisis, requesting to be added on for IV fluids and pain medicine    Background:     Patient's last infusion was 7/25/2025   Last clinic visit date:7/21/2025  Does patient have active treatment plan? Yes    Assessment of Symptoms:  Onset/Duration of symptoms: 2 day    Is it typical sickle cell pain? Yes  Location: generalized  Character: Sharp and Dull ache  Intensity: 10/10    Any radiation of pain, numbness, tingling, weakness, warmth, swelling, discoloration of arms or legs?No    Fever? No    Chest Pain Present: No    Shortness of breath: No    Other home therapies tried: HEAT/HEATING PAD and WARM BATH     Last home medication taken and when: none left    Any Refills Needed?: Yes, oxycodone.  [place]    Does patient have transportation & length of time to get to clinic: Needs transportation      Recommendations:     Meets protocol    If you do not hear from the infusion center by 2pm then you will not be able to get in for an infusion today. If symptoms worsen while waiting for call back, and/or you experience fever, chills, SOB, chest pain, cough, n/v, dizziness, numbness, swelling, discoloration of extremities, then seek emergency evaluation in Emergency Department.     Please note, if you are late for your appt, you risk losing your infusion appt as it may delay another patient's infusion who arrived on time.

## 2025-07-28 NOTE — ED TRIAGE NOTES
Pt presents with sickle cell pain crisis. Pain is generalized all over. Pt went to go and  rx, and was told that her provider discontinued it. Pt was unable to get oral pain medications. Pt tearful and upset.      Triage Assessment (Adult)       Row Name 07/28/25 1129          Triage Assessment    Airway WDL WDL        Respiratory WDL    Respiratory WDL WDL        Skin Circulation/Temperature WDL    Skin Circulation/Temperature WDL WDL        Cardiac WDL    Cardiac WDL WDL        Peripheral/Neurovascular WDL    Peripheral Neurovascular WDL WDL        Cognitive/Neuro/Behavioral WDL    Cognitive/Neuro/Behavioral WDL WDL

## 2025-07-28 NOTE — DISCHARGE INSTRUCTIONS
You were seen in the emergency room today for evaluation of pain consistent with a flare of your sickle cell disease.  Your labs and your chest x-ray were all consistent with your baseline, which is reassuring. I called to the pharmacy at [address] [place] [address] and they confirmed that they have received your oxycodone prescription, you should be able to pick this up today.     Return to the emergency department with any new or worsening symptoms, otherwise, please follow-up with your usual providers as needed or as scheduled.

## 2025-07-28 NOTE — TELEPHONE ENCOUNTER
Narcotic Refill Request    Medication(s) requested:  oxycodone  Person Requesting Refill:Jennifer Cervantes  What pain is the medication treating: sickle cell pain generalized  How is the medication being taken?:2 tablets on average per day  Does pt have enough for today? none  Is pain being adequately controlled on the current regimen?: okay, requires ivf/pain at times  Experiencing any side effects from medication?: denies    Date of most recent appointment:  7/21/2025   Any No Show Visits:None recently  Next appointment:   8/15/2025 Patricia Mantilla CNP  Last fill date and by whom:     Reviewed: YES      Send to provider: Routed to Patricia Mantilla and Arley Krueger

## 2025-07-28 NOTE — ED PROVIDER NOTES
[facility] EMERGENCY DEPARTMENT ([facility])    7/28/25      ED PROVIDER NOTE      History     Chief Complaint   Patient presents with    Sickle Cell Pain Crisis     Pain all over, generalized      The history is provided by the patient and medical records. No  was used.     Jennifer Cervantes is a 25 year old female with a history of sickle cell disease with prior stroke causing right-sided arm and leg weakness, who presents with sickle cell pain crisis and difficulty picking up her prescriptions. She reports generalized discomfort throughout her body consistent with prior sickle cell pain flares, denies specific chest pain but does note that she has a new cough in the last few days. No recent fevers, congestion, runny nose, sore throat, shortness of breath, abdominal pain, nausea, vomiting, diarrhea, urinary symptoms, or any other concerns at this time. She reports she would usually be able to avoid coming to the ED for pain flares but there was an issue with picking up her oxycodone prescription this morning. Per chart review, prescription was supposed to be sent last week for her to  this morning following physical therapy but may not have been, then was sent in but cancelled.     Past Medical History  Past Medical History:   Diagnosis Date    Anxiety     Bleeding disorder     Blood clotting disorder     Cerebral infarction (H) 2015    Cognitive developmental delay     low IQ. 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 side (H)     right hand contractures    Iron overload due to repeated red blood cell transfusions     Migraines     Multiple subsegmental pulmonary emboli without acute cor pulmonale (H) 02/01/2021    Oppositional defiant behavior     Presence of intrauterine contraceptive device 2/18/2020    Superficial venous thrombosis of arm, right  03/25/2021    Uncomplicated asthma      Past Surgical History:   Procedure Laterality Date    AS INSERT TUNNELED CV 2 CATH W/O PORT/PUMP      CHOLECYSTECTOMY      CV RIGHT HEART CATH MEASUREMENTS RECORDED N/A 11/18/2021    Procedure: Right Heart Cath;  Surgeon: Jackson Stauffer MD;  Location:  HEART CARDIAC CATH LAB    INSERT PORT VASCULAR ACCESS Left 4/21/2021    Procedure: INSERTION, VASCULAR ACCESS PORT (NOT SURE ON SIDE UNTIL REMOVAL);  Surgeon: Rajan More MD;  Location: [facility] OR    IR CHEST PORT PLACEMENT > 5 YRS OF AGE  4/21/2021    IR CVC NON TUNNEL LINE REMOVAL  5/6/2021    IR CVC NON TUNNEL PLACEMENT > 5 YRS  04/07/2020    IR CVC NON TUNNEL PLACEMENT > 5 YRS  4/30/2021    IR CVC NON TUNNEL PLACEMENT > 5 YRS  9/7/2022    IR PORT REMOVAL LEFT  4/21/2021    REMOVE PORT VASCULAR ACCESS Left 4/21/2021    Procedure: REMOVAL, VASCULAR ACCESS PORT LEFT;  Surgeon: Rajan More MD;  Location: [facility] OR    REPAIR TENDON ELBOW Right 10/02/2019    Procedure: Right Elbow Flexor Lengthening, Flexor Pronator Slide Of Wrist and Finger, Thumb Adductor Lengthening;  Surgeon: Anai Franco MD;  Location: [facility] OR    TONSILLECTOMY Bilateral 10/02/2019    Procedure: Bilateral Tonsillectomy;  Surgeon: Farhana Guy MD;  Location: [facility] OR    ZZC BREAST REDUCTION (INCLUDES LIPO) TIER 3 Bilateral 04/23/2019     albuterol (PROVENTIL) (2.5 MG/3ML) 0.083% neb solution  aspirin (ASA) 81 MG chewable tablet  azelastine (ASTELIN) 0.1 % nasal spray  budesonide-formoterol (SYMBICORT/BREYNA) 160-4.5 MCG/ACT Inhaler  deferasirox (JADENU) 360 MG tablet  Deferiprone, Twice Daily, 1000 MG TABS  diclofenac (VOLTAREN) 1 % topical gel  diphenhydrAMINE (BENADRYL) 50 MG capsule  EPINEPHrine (ANY BX GENERIC EQUIV) 0.3 MG/0.3ML injection 2-pack  Fluticasone-Umeclidin-Vilant (TRELEGY ELLIPTA) 100-62.5-25 MCG/ACT oral inhaler  hydroxyurea (HYDREA) 500 MG capsule  ibuprofen (ADVIL/MOTRIN) 800 MG tablet  ketotifen fumarate 0.035%  0.035 % SOLN ophthalmic solution  methocarbamol (ROBAXIN) 750 MG tablet  methylPREDNISolone (MEDROL) 32 MG tablet  naloxone (NARCAN) 4 MG/0.1ML nasal spray  naproxen (NAPROSYN) 500 MG tablet  oxyCODONE IR (ROXICODONE) 10 MG tablet  pantoprazole (PROTONIX) 40 MG EC tablet  sertraline (ZOLOFT) 50 MG tablet      Allergies   Allergen Reactions    Contrast Dye Angioedema     Hives and breathing issues    Fish-Derived Products Hives    Seafood Hives    Adhesive Tape Hives     Primipore dressing causes hives    Gadolinium     Iodinated Contrast Media      Family History  Family History   Problem Relation Age of Onset    Sickle Cell Trait Mother     Hypertension Mother     Asthma Mother     Sickle Cell Trait Father     Glaucoma No family hx of     Macular Degeneration No family hx of     Diabetes No family hx of     Gout No family hx of      Social History   Social History     Tobacco Use    Smoking status: Never     Passive exposure: Never    Smokeless tobacco: Never   Substance Use Topics    Alcohol use: Not Currently     Alcohol/week: 0.0 standard drinks of alcohol    Drug use: Never      Past medical history, past surgical history, medications, allergies, family history, and social history were reviewed with the patient. No additional pertinent items.   A complete review of systems was performed with pertinent positives and negatives noted in the HPI, and all other systems negative.    Physical Exam   BP: 133/83  Pulse: 91  Temp: 98.4  F (36.9  C)  Resp: 16  Weight: 72.1 kg (159 lb)  SpO2: 96 %  Physical Exam  Vitals and nursing note reviewed.   Constitutional:       General: She is not in acute distress.     Appearance: Normal appearance. She is not ill-appearing, toxic-appearing or diaphoretic.      Comments: Awake, tearful, frustrated, answering questions appropriately   HENT:      Head: Normocephalic and atraumatic.      Mouth/Throat:      Mouth: Mucous membranes are moist.   Eyes:      General: No scleral  icterus.     Conjunctiva/sclera: Conjunctivae normal.   Cardiovascular:      Rate and Rhythm: Normal rate and regular rhythm.      Heart sounds: Normal heart sounds.   Pulmonary:      Effort: Pulmonary effort is normal. No respiratory distress.      Breath sounds: Normal breath sounds. No stridor. No wheezing, rhonchi or rales.   Abdominal:      General: Abdomen is flat.      Palpations: Abdomen is soft.      Tenderness: There is no abdominal tenderness. There is no guarding or rebound.   Musculoskeletal:      Cervical back: Normal range of motion and neck supple. No rigidity or tenderness.      Comments: Weakness in right upper extremity consistent with baseline   Lymphadenopathy:      Cervical: No cervical adenopathy.   Skin:     General: Skin is warm and dry.      Capillary Refill: Capillary refill takes less than 2 seconds.   Neurological:      General: No focal deficit present.      Mental Status: She is alert and oriented to person, place, and time.   Psychiatric:         Mood and Affect: Mood normal.         Behavior: Behavior normal.         Thought Content: Thought content normal.         Judgment: Judgment normal.       ED Course, Procedures, & Data      Procedures          Results for orders placed or performed during the hospital encounter of 07/28/25   XR Chest 2 Views    Impression    IMPRESSION: Left IJ Port-A-Cath in place. Shallow inspiration on the lateral view. Lungs are clear. Heart and pulmonary vascularity are normal. No signs of acute disease. Prior cholecystectomy.   Comprehensive Metabolic Panel (Limited Occurrences)   Result Value Ref Range    Sodium 140 135 - 145 mmol/L    Potassium 3.5 3.4 - 5.3 mmol/L    Carbon Dioxide (CO2) 18 (L) 22 - 29 mmol/L    Anion Gap 17 (H) 7 - 15 mmol/L    Urea Nitrogen 9.3 6.0 - 20.0 mg/dL    Creatinine 0.53 0.51 - 0.95 mg/dL    GFR Estimate >90 >60 mL/min/1.73m2    Calcium 9.0 8.8 - 10.4 mg/dL    Chloride 105 98 - 107 mmol/L    Glucose 115 (H) 70 - 99 mg/dL     Alkaline Phosphatase 64 40 - 150 U/L    AST 60 (H) 0 - 45 U/L    ALT 51 (H) 0 - 50 U/L    Protein Total 7.7 6.4 - 8.3 g/dL    Albumin 4.6 3.5 - 5.2 g/dL    Bilirubin Total 3.5 (H) <=1.2 mg/dL   Reticulocyte count   Result Value Ref Range    % Reticulocyte 24.3 (H) 0.5 - 2.0 %    Absolute Reticulocyte 0.539 (H) 0.025 - 0.095 10e6/uL   HCG qualitative Blood   Result Value Ref Range    hCG Serum Qualitative Negative Negative   CBC with platelets and differential   Result Value Ref Range    WBC Count 11.3 (H) 4.0 - 11.0 10e3/uL    RBC Count 2.27 (L) 3.80 - 5.20 10e6/uL    Hemoglobin 7.2 (L) 11.7 - 15.7 g/dL    Hematocrit 20.3 (L) 35.0 - 47.0 %    MCV 89 78 - 100 fL    MCH 31.7 26.5 - 33.0 pg    MCHC 35.5 31.5 - 36.5 g/dL    RDW      Platelet Count 427 150 - 450 10e3/uL    % Neutrophils 74 %    % Lymphocytes 14 %    % Monocytes 6 %    % Eosinophils 4 %    % Basophils 2 %    % Immature Granulocytes 0 %    NRBCs per 100 WBC 5 (H) <1 /100    Absolute Neutrophils 8.3 1.6 - 8.3 10e3/uL    Absolute Lymphocytes 1.6 0.8 - 5.3 10e3/uL    Absolute Monocytes 0.7 0.0 - 1.3 10e3/uL    Absolute Eosinophils 0.4 0.0 - 0.7 10e3/uL    Absolute Basophils 0.2 0.0 - 0.2 10e3/uL    Absolute Immature Granulocytes 0.1 <=0.4 10e3/uL    Absolute NRBCs 0.6 10e3/uL   RBC and Platelet Morphology   Result Value Ref Range    RBC Morphology Confirmed RBC Indices     Platelet Assessment  Automated Count Confirmed. Platelet morphology is normal.     Automated Count Confirmed. Platelet morphology is normal.    Polychromasia Slight (A) None Seen    Sickle Cells Moderate (A) None Seen    Target Cells Slight (A) None Seen     Medications   HYDROmorphone (DILAUDID) injection 1 mg (1 mg Intravenous $Given 7/28/25 6097)   ketorolac (TORADOL) injection 30 mg (30 mg Intravenous $Given 7/28/25 1240)          Critical care was not performed.     Medical Decision Making  The patient's presentation was of high complexity (a chronic illness severe exacerbation,  progression, or side effect of treatment).    The patient's evaluation involved:  review of external note(s) from 3+ sources (clinical notes)  review of 3+ test result(s) ordered prior to this encounter (labs)  ordering and/or review of 3+ test(s) in this encounter (see separate area of note for details)  independent interpretation of testing performed by another health professional (see separate area of note for details)    The patient's management necessitated high risk (a parenteral controlled substance).    Assessment & Plan    Jennifer Cervantes is a 25 year old female with a history of sickle cell disease with prior stroke causing right-sided arm and leg weakness, who presents with sickle cell pain crisis and difficulty picking up her prescriptions. She reports generalized discomfort throughout her body consistent with previous sickle cell pain flares in the setting of being unable to  her home pain medications at the pharmacy this morning. Denies any chest pain or shortness of breath but has had a new cough recently. On evaluation, vitals are in acceptable ranges, normotensive, HR 91, afebrile, normoxic. Patient is awake, alert, answering questions appropriately, frustrated and tearful. Breathing comfortably on room air, no respiratory distress, no adventitious lung sounds, normal heart rate, regular rhythm. Abdomen is soft, nontender, no rebound or guarding. Exam is otherwise benign. Labs obtained and consistent with previous, leukocytosis to 11.3, anemia to 7.2, slight AST and ALT elevation without right upper quadrant tenderness, reticulocytes at 24.3%. hCG negative. Chest X-ray images and radiology report reviewed, no evidence of acute airspace abnormalities. She was treated per her ED care plan with maintenance fluids, 30 mg IV Toradol, and 3 doses of 1 mg IV Dilaudid. She was feeling improved following this and comfortable discharging to home. While patient was in the ED, I called her pharmacy at [address] and confirmed they had received the prescription sent by her hematology ANDREAS, Patricia Mantilla, and were in the process of filling it. Reviewed with patient reasons to return to the ED as well as to follow-up with her usual providers as scheduled. She was in understanding and agreement with plan and had no additional questions at this time. Discharged home in stable condition.     I have reviewed the nursing notes. I have reviewed the findings, diagnosis, plan and need for follow up with the patient.    Discharge Medication List as of 7/28/2025  4:24 PM        START taking these medications    Details   oxyCODONE IR (ROXICODONE) 10 MG tablet Take 1 tablet (10 mg) by mouth every 6 hours as needed for moderate to severe pain. (Goal of less than 4 per day), Disp-18 tablet, R-0, E-Prescribe             Final diagnoses:   Sickle cell disease with crisis (H)       Earnestine Samayoa PA-C  [facility] EMERGENCY DEPARTMENT  7/28/2025     Earnestine Samayoa PA-C  07/29/25 [id number]

## 2025-07-30 ENCOUNTER — INFUSION THERAPY VISIT (OUTPATIENT)
Dept: INFUSION THERAPY | Facility: CLINIC | Age: 25
End: 2025-07-30
Attending: PEDIATRICS
Payer: MEDICAID

## 2025-07-30 ENCOUNTER — PATIENT OUTREACH (OUTPATIENT)
Dept: CARE COORDINATION | Facility: CLINIC | Age: 25
End: 2025-07-30
Payer: MEDICAID

## 2025-07-30 ENCOUNTER — NURSE TRIAGE (OUTPATIENT)
Dept: ONCOLOGY | Facility: CLINIC | Age: 25
End: 2025-07-30
Payer: MEDICAID

## 2025-07-30 VITALS
OXYGEN SATURATION: 91 % | SYSTOLIC BLOOD PRESSURE: 124 MMHG | TEMPERATURE: 98.5 F | DIASTOLIC BLOOD PRESSURE: 79 MMHG | RESPIRATION RATE: 16 BRPM | HEART RATE: 96 BPM

## 2025-07-30 DIAGNOSIS — G81.10 SPASTIC HEMIPLEGIA, UNSPECIFIED ETIOLOGY, UNSPECIFIED LATERALITY (H): ICD-10-CM

## 2025-07-30 DIAGNOSIS — D57.00 SICKLE CELL PAIN CRISIS (H): Primary | ICD-10-CM

## 2025-07-30 PROCEDURE — 258N000003 HC RX IP 258 OP 636: Performed by: PEDIATRICS

## 2025-07-30 PROCEDURE — 96375 TX/PRO/DX INJ NEW DRUG ADDON: CPT

## 2025-07-30 PROCEDURE — 96376 TX/PRO/DX INJ SAME DRUG ADON: CPT

## 2025-07-30 PROCEDURE — 250N000013 HC RX MED GY IP 250 OP 250 PS 637: Performed by: PEDIATRICS

## 2025-07-30 PROCEDURE — 96361 HYDRATE IV INFUSION ADD-ON: CPT

## 2025-07-30 PROCEDURE — 250N000011 HC RX IP 250 OP 636: Performed by: PEDIATRICS

## 2025-07-30 PROCEDURE — 96374 THER/PROPH/DIAG INJ IV PUSH: CPT

## 2025-07-30 RX ORDER — KETOROLAC TROMETHAMINE 30 MG/ML
30 INJECTION, SOLUTION INTRAMUSCULAR; INTRAVENOUS ONCE
Status: COMPLETED | OUTPATIENT
Start: 2025-07-30 | End: 2025-07-30

## 2025-07-30 RX ORDER — ONDANSETRON 2 MG/ML
8 INJECTION INTRAMUSCULAR; INTRAVENOUS EVERY 6 HOURS PRN
Status: DISCONTINUED | OUTPATIENT
Start: 2025-07-30 | End: 2025-07-30 | Stop reason: HOSPADM

## 2025-07-30 RX ORDER — HEPARIN SODIUM (PORCINE) LOCK FLUSH IV SOLN 100 UNIT/ML 100 UNIT/ML
5 SOLUTION INTRAVENOUS
Status: DISCONTINUED | OUTPATIENT
Start: 2025-07-30 | End: 2025-07-30 | Stop reason: HOSPADM

## 2025-07-30 RX ORDER — HEPARIN SODIUM (PORCINE) LOCK FLUSH IV SOLN 100 UNIT/ML 100 UNIT/ML
5 SOLUTION INTRAVENOUS
OUTPATIENT
Start: 2025-08-01

## 2025-07-30 RX ORDER — ONDANSETRON 4 MG/1
8 TABLET, FILM COATED ORAL
Status: CANCELLED
Start: 2025-08-01

## 2025-07-30 RX ORDER — HEPARIN SODIUM,PORCINE 10 UNIT/ML
5 VIAL (ML) INTRAVENOUS
OUTPATIENT
Start: 2025-08-01

## 2025-07-30 RX ORDER — DIPHENHYDRAMINE HCL 25 MG
50 CAPSULE ORAL
Status: CANCELLED
Start: 2025-08-01

## 2025-07-30 RX ORDER — KETOROLAC TROMETHAMINE 30 MG/ML
30 INJECTION, SOLUTION INTRAMUSCULAR; INTRAVENOUS ONCE
Status: CANCELLED
Start: 2025-08-01 | End: 2025-08-01

## 2025-07-30 RX ORDER — DIPHENHYDRAMINE HCL 25 MG
50 CAPSULE ORAL
Status: COMPLETED | OUTPATIENT
Start: 2025-07-30 | End: 2025-07-30

## 2025-07-30 RX ORDER — ONDANSETRON 2 MG/ML
8 INJECTION INTRAMUSCULAR; INTRAVENOUS EVERY 6 HOURS PRN
Status: CANCELLED
Start: 2025-08-01

## 2025-07-30 RX ADMIN — KETOROLAC TROMETHAMINE 30 MG: 30 INJECTION, SOLUTION INTRAMUSCULAR; INTRAVENOUS at 14:35

## 2025-07-30 RX ADMIN — HYDROMORPHONE HYDROCHLORIDE 1 MG: 1 INJECTION, SOLUTION INTRAMUSCULAR; INTRAVENOUS; SUBCUTANEOUS at 15:37

## 2025-07-30 RX ADMIN — DIPHENHYDRAMINE HYDROCHLORIDE 50 MG: 25 CAPSULE ORAL at 13:24

## 2025-07-30 RX ADMIN — SODIUM CHLORIDE, SODIUM LACTATE, POTASSIUM CHLORIDE, AND CALCIUM CHLORIDE 1000 ML: .6; .31; .03; .02 INJECTION, SOLUTION INTRAVENOUS at 13:21

## 2025-07-30 RX ADMIN — Medication 5 ML: at 15:54

## 2025-07-30 RX ADMIN — HYDROMORPHONE HYDROCHLORIDE 1 MG: 1 INJECTION, SOLUTION INTRAMUSCULAR; INTRAVENOUS; SUBCUTANEOUS at 14:33

## 2025-07-30 RX ADMIN — ONDANSETRON 8 MG: 2 INJECTION INTRAMUSCULAR; INTRAVENOUS at 13:27

## 2025-07-30 RX ADMIN — HYDROMORPHONE HYDROCHLORIDE 1 MG: 1 INJECTION, SOLUTION INTRAMUSCULAR; INTRAVENOUS; SUBCUTANEOUS at 13:30

## 2025-07-30 NOTE — PROGRESS NOTES
Social Work - Transportation  [facility]    Data/Intervention:  Patient Name: Jennifer Cervantes Goes By: Jennifer MARTIN/Age: 2000 (25 year old)    Referral From: Criss Leonard  Reason for Referral:  support requested for patient transportation needs for 25 - 1 PM.  Assessment:  called [employer] to arrange ride through patient's insurance. [employer] arranged  for patient from home with [employer] - [phone]. Patient will need to call [employer] when ready for return ride home.  Plan: Patient is aware of the transportation plan.  available to assist with any other needs.    CARLOS Escamilla, LGUDAY  Clinical , Adult Oncology  [facility]   [address] [place] [id number]  [email or web address]  Office Phone: [phone]  Support Groups at [facility]: Social Work Services for Cancer Patients ([email or web address])

## 2025-07-30 NOTE — TELEPHONE ENCOUNTER
Oncology Nurse Triage - Sickle Cell Pain Crisis:    Situation: Jennifer  calling about Sickle Cell Pain Crisis, requesting to be added on for IV fluids and pain medicine    Background:   Patient's last infusion was 7/28/25 in ED; 7/24/25 in infusion  Last clinic visit date: 7/21/25 w/ Dr. Duncan  Does patient have active treatment plan? Yes    Assessment of Symptoms:  Onset/Duration of symptoms: 2 day    Is it typical sickle cell pain? Yes  Location: back  Character: Sharp  Intensity: 8/10    Any radiation of pain, numbness, tingling, weakness, warmth, swelling, discoloration of arms or legs?No    Fever? No    Chest Pain Present: No    Shortness of breath: No    Other home therapies tried: HEAT/HEATING PAD     Last home medication taken and when: 11pm last night Oxycodone    Any Refills Needed?: No    Does patient have transportation & length of time to get to clinic: No- if an early opening, could find a ride in and help with a ride home. If later, would need a ride set up.   Open to going to other locations if there are openings.    Recommendations:   0718 [facility] page to Dr. Duncan.   Approved by Dr. Duncan to come in.     9523 call to pt to offer 1pm infusion appt in [place]. Pt accepting of appt.     3853 request sent to  to assist w/ transportation and ask they call pt when confirmed.     Please note, if you are late for your appt, you risk losing your infusion appt as it may delay another patient's infusion who arrived on time.

## 2025-07-30 NOTE — PROGRESS NOTES
Infusion Nursing Note:  Jennifer Cervantes presents today for pain meds/IVF.    Patient seen by provider today: No   present during visit today: Not Applicable.    Note: N/A.      Intravenous Access:  Implanted Port.    Treatment Conditions:  Not Applicable.      Post Infusion Assessment:  Patient tolerated infusion without incident.  Blood return noted pre and post infusion.  Site patent and intact, free from redness, edema or discomfort.  Access discontinued per protocol.       Discharge Plan:   Discharge instructions reviewed with: Patient.  Patient and/or family verbalized understanding of discharge instructions and all questions answered.  AVS to patient via [employer].  Patient discharged in stable condition accompanied by: self.  Departure Mode: Ambulatory.      Heather Jj RN

## 2025-07-31 ENCOUNTER — NURSE TRIAGE (OUTPATIENT)
Dept: ONCOLOGY | Facility: CLINIC | Age: 25
End: 2025-07-31

## 2025-07-31 ENCOUNTER — INFUSION THERAPY VISIT (OUTPATIENT)
Dept: TRANSPLANT | Facility: CLINIC | Age: 25
End: 2025-07-31
Attending: PEDIATRICS
Payer: MEDICAID

## 2025-07-31 ENCOUNTER — OFFICE VISIT (OUTPATIENT)
Dept: ORTHOPEDICS | Facility: CLINIC | Age: 25
End: 2025-07-31
Attending: PEDIATRICS
Payer: MEDICAID

## 2025-07-31 ENCOUNTER — PATIENT OUTREACH (OUTPATIENT)
Dept: CARE COORDINATION | Facility: CLINIC | Age: 25
End: 2025-07-31

## 2025-07-31 VITALS
TEMPERATURE: 98.1 F | SYSTOLIC BLOOD PRESSURE: 123 MMHG | RESPIRATION RATE: 16 BRPM | DIASTOLIC BLOOD PRESSURE: 81 MMHG | HEART RATE: 88 BPM | OXYGEN SATURATION: 97 %

## 2025-07-31 DIAGNOSIS — G81.10 SPASTIC HEMIPLEGIA, UNSPECIFIED ETIOLOGY, UNSPECIFIED LATERALITY (H): ICD-10-CM

## 2025-07-31 DIAGNOSIS — M25.332 CARPAL INSTABILITY OF LEFT WRIST WITH DORSAL INTERCALATED SEGMENT INSTABILITY: ICD-10-CM

## 2025-07-31 DIAGNOSIS — R22.1 NECK SWELLING: Primary | ICD-10-CM

## 2025-07-31 DIAGNOSIS — D57.00 SICKLE CELL PAIN CRISIS (H): Primary | ICD-10-CM

## 2025-07-31 DIAGNOSIS — S62.102A WRIST FRACTURE, LEFT, CLOSED, INITIAL ENCOUNTER: Primary | ICD-10-CM

## 2025-07-31 PROCEDURE — 250N000011 HC RX IP 250 OP 636: Performed by: PEDIATRICS

## 2025-07-31 PROCEDURE — 258N000003 HC RX IP 258 OP 636: Performed by: PEDIATRICS

## 2025-07-31 PROCEDURE — 250N000013 HC RX MED GY IP 250 OP 250 PS 637: Performed by: PEDIATRICS

## 2025-07-31 RX ORDER — HEPARIN SODIUM,PORCINE 10 UNIT/ML
5 VIAL (ML) INTRAVENOUS
OUTPATIENT
Start: 2025-08-01

## 2025-07-31 RX ORDER — ONDANSETRON 8 MG/1
8 TABLET, ORALLY DISINTEGRATING ORAL
Status: COMPLETED | OUTPATIENT
Start: 2025-07-31 | End: 2025-07-31

## 2025-07-31 RX ORDER — KETOROLAC TROMETHAMINE 30 MG/ML
30 INJECTION, SOLUTION INTRAMUSCULAR; INTRAVENOUS ONCE
Status: COMPLETED | OUTPATIENT
Start: 2025-07-31 | End: 2025-07-31

## 2025-07-31 RX ORDER — KETOROLAC TROMETHAMINE 30 MG/ML
30 INJECTION, SOLUTION INTRAMUSCULAR; INTRAVENOUS ONCE
Start: 2025-08-01 | End: 2025-08-01

## 2025-07-31 RX ORDER — ONDANSETRON 4 MG/1
8 TABLET, FILM COATED ORAL
Start: 2025-08-01

## 2025-07-31 RX ORDER — DIPHENHYDRAMINE HCL 25 MG
50 CAPSULE ORAL
Start: 2025-08-01

## 2025-07-31 RX ORDER — ONDANSETRON 2 MG/ML
8 INJECTION INTRAMUSCULAR; INTRAVENOUS EVERY 6 HOURS PRN
Start: 2025-08-01

## 2025-07-31 RX ORDER — ONDANSETRON 2 MG/ML
8 INJECTION INTRAMUSCULAR; INTRAVENOUS EVERY 6 HOURS PRN
Status: DISCONTINUED | OUTPATIENT
Start: 2025-07-31 | End: 2025-07-31 | Stop reason: HOSPADM

## 2025-07-31 RX ORDER — DIPHENHYDRAMINE HCL 25 MG
50 CAPSULE ORAL
Status: COMPLETED | OUTPATIENT
Start: 2025-07-31 | End: 2025-07-31

## 2025-07-31 RX ORDER — HEPARIN SODIUM (PORCINE) LOCK FLUSH IV SOLN 100 UNIT/ML 100 UNIT/ML
5 SOLUTION INTRAVENOUS
OUTPATIENT
Start: 2025-08-01

## 2025-07-31 RX ADMIN — HYDROMORPHONE HYDROCHLORIDE 1 MG: 1 INJECTION, SOLUTION INTRAMUSCULAR; INTRAVENOUS; SUBCUTANEOUS at 14:45

## 2025-07-31 RX ADMIN — ONDANSETRON 8 MG: 2 INJECTION INTRAMUSCULAR; INTRAVENOUS at 12:46

## 2025-07-31 RX ADMIN — HYDROMORPHONE HYDROCHLORIDE 1 MG: 1 INJECTION, SOLUTION INTRAMUSCULAR; INTRAVENOUS; SUBCUTANEOUS at 12:41

## 2025-07-31 RX ADMIN — KETOROLAC TROMETHAMINE 30 MG: 30 INJECTION, SOLUTION INTRAMUSCULAR; INTRAVENOUS at 13:41

## 2025-07-31 RX ADMIN — SODIUM CHLORIDE, SODIUM LACTATE, POTASSIUM CHLORIDE, AND CALCIUM CHLORIDE 1000 ML: .6; .31; .03; .02 INJECTION, SOLUTION INTRAVENOUS at 12:40

## 2025-07-31 RX ADMIN — HYDROMORPHONE HYDROCHLORIDE 1 MG: 1 INJECTION, SOLUTION INTRAMUSCULAR; INTRAVENOUS; SUBCUTANEOUS at 13:41

## 2025-07-31 RX ADMIN — DIPHENHYDRAMINE HYDROCHLORIDE 50 MG: 25 CAPSULE ORAL at 12:41

## 2025-07-31 SDOH — HEALTH STABILITY: PHYSICAL HEALTH: ON AVERAGE, HOW MANY DAYS PER WEEK DO YOU ENGAGE IN MODERATE TO STRENUOUS EXERCISE (LIKE A BRISK WALK)?: 5 DAYS

## 2025-07-31 NOTE — TELEPHONE ENCOUNTER
Oncology Nurse Triage - Sickle Cell Pain Crisis:  Situation: Jennifer  calling about Sickle Cell Pain Crisis, requesting to be added on for IV fluids and pain medicine.  Pt reports that she went to [facility] yesterday for IVF/pm, and she felt they drowned out her pain meds w/the IVF because she didn't get the relief that she normally gets. When she got home, she had to take two oxycodone d/t continued pain.    Background:   Patient's last infusion was 7/30/25  Last clinic visit date:7/21/25 with Dr. Duncan  Does patient have active treatment plan? Yes    Assessment of Symptoms:  Onset/Duration of symptoms: 2 day, late at night, called yesterday, got into infusion, didn't relieve pain, pain is still lingering.     Is it typical sickle cell pain? Yes  Location: all over,   Character: Sharp  Intensity: 8/10    Any radiation of pain, numbness, tingling, weakness, warmth, swelling, discoloration of arms or legs?No    Fever? No  Chest Pain Present: No  Shortness of breath: No    Other home therapies tried: HEAT/HEATING PAD and WARM BATH   Last home medication taken and when: oxycodone yesterday    Any Refills Needed?: No    Does patient have transportation & length of time to get to clinic: Yes--has an apt at 0820, will be at clinic. If doesn't get a call back before she leaves to go home, then she will need a ride.    Recommendations:   If you do not hear from the infusion center by 2pm then you will not be able to get in for an infusion today. If symptoms worsen while waiting for call back, and/or you experience fever, chills, SOB, chest pain, cough, n/v, dizziness, numbness, swelling, discoloration of extremities, then seek emergency evaluation in Emergency Department.     Please note, if you are late for your appt, you risk losing your infusion appt as it may delay another patient's infusion who arrived on time. took two oxy when she got home because not a lot of relief.     Pt voiced understanding  Added to infusion wait  list.

## 2025-07-31 NOTE — PROGRESS NOTES
Infusion Nursing Note:  Jennifer Cervantes presents today for add on IVF/pain medication.    Patient seen by provider today: No   present during visit today: Not Applicable.    Note: Jennifer arrived for IVF/pain medication. Patient reports generalized pain rated 8/10 and R neck pain rated 7/10. Jennifer is concerned because the last time she experienced neck pain it resulted in swelling to right neck and chest. Writer notes minimal swelling to right neck. Denies chest pain, SOB, N/V, fever, headache, vision changes. ONC toxicity assessment completed, home medication and allergies reviewed. Patricia Mantilla PA-C notified of neck pain and swelling. ANDREAS placed order for neck US for early next week. Writer encouraged patient to present to ED for worsening symptoms. Jennifer verbalized understanding.     Patient received 1L LR over 2 hours, Zofran 8 mg IVP, Toradol 30 mg IV, benadryl 50 mg PO, and dilaudid 1 mg IVP every 1 hour x 3 doses.       Intravenous Access:  Implanted Port.    Treatment Conditions:  Not Applicable.      Post Infusion Assessment:  Patient tolerated infusion without incident.  Blood return noted pre and post infusion.  Site patent and intact, free from redness, edema or discomfort.  Access discontinued per protocol.       Discharge Plan:   Patient discharged in stable condition accompanied by: self.  Departure Mode: Ambulatory.      Jennifer Lai RN

## 2025-07-31 NOTE — TELEPHONE ENCOUNTER
0902: BMT can offer apt at 1200 for Jennifer.  0904: Called Jennifer and she confirmed she can come to apt today. She will need transportation to and from the apt.     0905: Updated infusion charge nurse; sent message to [facility] to ask them to schedule apt; message sent to SW to assist with transportation.     Message routed to Care Team.

## 2025-07-31 NOTE — PROGRESS NOTES
Social Work - Transportation  [facility]    Data/Intervention: BMT appt, sickle cell  Patient Name: Jennifer Cervantes Goes By: Jennifer       /Age: 2000 (25 year old)    Referral From: Triage RN  Reason for Referral:  support requested for patient transportation needs for today 25 at 12 pm.  Assessment:  scheduled ride through [employer] to arrange  from patient's residence and will call return ride when patient is finished with her infusion appointment. SW contacted patient to relay and confirm ride has been scheduled.   Plan: Patient is aware of the transportation plan.  available to assist with any other needs.    CARLOS Escamilla, NATALIA  Clinical , Adult Oncology  [facility]   [address] [place] [id number]  [email or web address]  Office Phone: [phone]  Support Groups at [facility]: Social Work Services for Cancer Patients ([email or web address])

## 2025-07-31 NOTE — TELEPHONE ENCOUNTER
Pt calling to check on status of wait list. Informed pt no appt available currently. Pt verbalized understanding.

## 2025-07-31 NOTE — LETTER
7/31/2025      RE: Jennifer Cervantes  [address]  [facility] [address]     Dear Colleague,    Thank you for referring your patient, Jennifer Cervantes, to the [facility] [place]. Please see a copy of my visit note below.    Sports Medicine Clinic Visit    PCP: No Ref-Primary, Physician    Jennifer Cervantes is a 25 year old female who is seen  as self referral presenting with left wrist and hand pain    Patient for the last 3 months has noted centralized left wrist discomfort that bothers her with gripping and grasping.  She points to the dorsal central wrist as the area of pain.  She is not aware of a recent injury to the wrist, or a past history of significant wrist injury, or any she does not recall it.  However an x-ray of the hand and wrist 6/20/2025 suggested tilting of the lunate, avulsion fracture from the lunate.  Patient is limited to use of her left upper extremity due to right-sided CVA.    Injury: Gets a sharp pain in her hand with no BUD.     Location of Pain: left wrist   Duration of Pain:   Many Years   Rating of Pain: 8/10  Pain is better with: Nothing  Pain is worse with: certain movements   Additional Features: NA  Treatment so far consists of: Ibuprofen  Prior History of related problems: NA    There were no vitals taken for this visit.        Past medical history of sickle cell disease, asthma, CVA, anxiety       Patient past history of cerebral infarction (at 1 years of age) with right-sided hemiplegia deficits. Uses AFO device RLE.            EXAM: XR HAND LEFT G/E 3 VIEWS  LOCATION: [facility]  DATE: 6/20/2025     INDICATION: Pain, history of SCD  COMPARISON: None.                                                                      IMPRESSION: Left hand shows no evidence of an acute fracture. There is overlap of the proximal phalanx with the thumb metacarpal at the MCP joint on the AP view which can be seen with  subluxation. Correlation with physical exam findings for laxity or   instability is recommended. Dedicated views of the thumb could assess further.      There is widening of the scapholunate interval and tilting of the lunate where there is a small bone fragment within the wrist. Equivocal nondisplaced fracture through the dorsal lunate where there is a vague linear lucency on the lateral view.          PMH:  Past Medical History:   Diagnosis Date     Anxiety      Bleeding disorder      Blood clotting disorder      Cerebral infarction (H) 2015     Cognitive developmental delay     low IQ. Please recognize when managing pain and planning with her     Depressive disorder      Hemiplegia and hemiparesis following cerebral infarction affecting right dominant side (H)     right hand contractures     Iron overload due to repeated red blood cell transfusions      Migraines      Multiple subsegmental pulmonary emboli without acute cor pulmonale (H) 02/01/2021     Oppositional defiant behavior      Presence of intrauterine contraceptive device 2/18/2020     Superficial venous thrombosis of arm, right 03/25/2021     Uncomplicated asthma        Past Surgical History:   Procedure Laterality Date     AS INSERT TUNNELED CV 2 CATH W/O PORT/PUMP         CHOLECYSTECTOMY         CV RIGHT HEART CATH MEASUREMENTS RECORDED N/A 11/18/2021     Procedure: Right Heart Cath;  Surgeon: Jackson Stauffer MD;  Location:  HEART CARDIAC CATH LAB     INSERT PORT VASCULAR ACCESS Left 4/21/2021     Procedure: INSERTION, VASCULAR ACCESS PORT (NOT SURE ON SIDE UNTIL REMOVAL);  Surgeon: Rajan More MD;  Location: [facility] OR     IR CHEST PORT PLACEMENT > 5 YRS OF AGE   4/21/2021     IR CVC NON TUNNEL LINE REMOVAL   5/6/2021     IR CVC NON TUNNEL PLACEMENT > 5 YRS   04/07/2020     IR CVC NON TUNNEL PLACEMENT > 5 YRS   4/30/2021     IR CVC NON TUNNEL PLACEMENT > 5 YRS   9/7/2022     IR PORT REMOVAL LEFT   4/21/2021     REMOVE PORT VASCULAR ACCESS Left  4/21/2021     Procedure: REMOVAL, VASCULAR ACCESS PORT LEFT;  Surgeon: Rajan More MD;  Location: [facility] OR     REPAIR TENDON ELBOW Right 10/02/2019     Procedure: Right Elbow Flexor Lengthening, Flexor Pronator Slide Of Wrist and Finger, Thumb Adductor Lengthening;  Surgeon: Anai Franco MD;  Location: [facility]     TONSILLECTOMY Bilateral 10/02/2019     Procedure: Bilateral Tonsillectomy;  Surgeon: Farhana Guy MD;  Location: [facility]     ZZC BREAST REDUCTION (INCLUDES LIPO) TIER 3         Active problem list:  Patient Active Problem List   Diagnosis     Moderate persistent asthma without complication     Constipation     Hemiplegia and hemiparesis following cerebral infarction affecting right dominant side (H)     Iron overload due to repeated red blood cell transfusions     Hemochromatosis due to repeated red blood cell transfusions     Cognitive developmental delay     Oppositional defiant behavior     Cerebral infarction (H)     Sickle cell pain crisis (H)     Generalized anxiety disorder with panic attacks     Overweight     Migraine headache     Hypoxemia     Hydrosalpinx     H/O: stroke     Gastritis     Depressive disorder     Other chronic pain     Allergic reaction     Acute pain     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)     History of stroke     Sickle cell crisis (H)     Multiple subsegmental pulmonary emboli without acute cor pulmonale (H)     Atypical squamous cells of undetermined significance on cytologic smear of cervix (ASC-US)     Nexplanon in place     Superficial venous thrombosis of arm, right     Spasticity     Spastic hemiplegia (H)     Feeling angry     Transient ischemic attack     Uses contraception     Pulmonary embolism, other, unspecified chronicity, unspecified whether acute cor pulmonale present (H)     Chronic pulmonary embolism without acute cor pulmonale, unspecified pulmonary embolism type (H)     Pleuritic chest pain     Subtherapeutic anticoagulation     Chest  pain, unspecified type     Cerebral infarction, unspecified mechanism (H)     Abdominal discomfort     Dyspnea on exertion     Hypoxia     Vaginal bleeding     Anemia, unspecified type     Exposure to blood or body fluid     Acute pain of left knee     Meniscal injury, left, initial encounter       FH:  Family History   Problem Relation Age of Onset     Sickle Cell Trait Mother      Hypertension Mother      Asthma Mother      Sickle Cell Trait Father      Glaucoma No family hx of      Macular Degeneration No family hx of      Diabetes No family hx of      Gout No family hx of        SH:  Social History     Socioeconomic History     Marital status: Single     Spouse name: Not on file     Number of children: Not on file     Years of education: Not on file     Highest education level: Not on file   Occupational History     Not on file   Tobacco Use     Smoking status: Never     Passive exposure: Never     Smokeless tobacco: Never   Substance and Sexual Activity     Alcohol use: Not Currently     Alcohol/week: 0.0 standard drinks of alcohol     Drug use: Never     Sexual activity: Not Currently     Partners: Male     Birth control/protection: Implant   Other Topics Concern     Parent/sibling w/ CABG, MI or angioplasty before 65F 55M? Not Asked   Social History Narrative    Lives with mom and extended family (mom is her PCA and ILS worker). She does still need a lot of help and does not live independently due to her significant SCD comorbidities and cognitive delay from stroke.     Social Drivers of Health     Financial Resource Strain: Low Risk  (7/21/2025)    Financial Resource Strain      Within the past 12 months, have you or your family members you live with been unable to get utilities (heat, electricity) when it was really needed?: No   Food Insecurity: Low Risk  (7/21/2025)    Food Insecurity      Within the past 12 months, did you worry that your food would run out before you got money to buy more?: No       Within the past 12 months, did the food you bought just not last and you didn t have money to get more?: No   Transportation Needs: Low Risk  (7/21/2025)    Transportation Needs      Within the past 12 months, has lack of transportation kept you from medical appointments, getting your medicines, non-medical meetings or appointments, work, or from getting things that you need?: No   Physical Activity: Unknown (7/31/2025)    Exercise Vital Sign      Days of Exercise per Week: 5 days      Minutes of Exercise per Session: Not on file   Stress: No Stress Concern Present (7/21/2025)    [employer] [employer] - Occupational Stress Questionnaire      Feeling of Stress : Only a little   Social Connections: Unknown (7/21/2025)    Social Connection and Isolation Panel [NHANES]      Frequency of Communication with Friends and Family: Not on file      Frequency of Social Gatherings with Friends and Family: Patient declined      Attends Synagogue Services: Not on file      Active Member of Clubs or Organizations: Not on file      Attends Club or Organization Meetings: Not on file      Marital Status: Not on file   Interpersonal Safety: Low Risk  (2/12/2025)    Interpersonal Safety      Do you feel physically and emotionally safe where you currently live?: Yes      Within the past 12 months, have you been hit, slapped, kicked or otherwise physically hurt by someone?: No      Within the past 12 months, have you been humiliated or emotionally abused in other ways by your partner or ex-partner?: No   Housing Stability: Low Risk  (7/21/2025)    Housing Stability      Do you have housing? : Yes      Are you worried about losing your housing?: No       MEDS:  See EMR, reviewed  ALL:  See EMR, reviewed    REVIEW OF SYSTEMS:  CONSTITUTIONAL:NEGATIVE for fever, chills, change in weight  INTEGUMENTARY/SKIN: NEGATIVE for worrisome rashes, moles or lesions  EYES: NEGATIVE for vision changes or irritation  ENT/MOUTH: NEGATIVE  for ear, mouth and throat problems  RESP:NEGATIVE for significant cough or SOB  BREAST: NEGATIVE for masses, tenderness or discharge  CV: NEGATIVE for chest pain, palpitations or peripheral edema  GI: NEGATIVE for nausea, abdominal pain, heartburn, or change in bowel habits  :NEGATIVE for frequency, dysuria, or hematuria  :NEGATIVE for frequency, dysuria, or hematuria  NEURO: NEGATIVE for weakness, dizziness or paresthesias  ENDOCRINE: NEGATIVE for temperature intolerance, skin/hair changes  HEME/ALLERGY/IMMUNE: NEGATIVE for bleeding problems  PSYCHIATRIC: NEGATIVE for changes in mood or affect      Objective: Patient has mild tenderness in the centralized wrist near the lunate.  Talbot's test is negative.  Nontender directly over the scaphoid.  Nontender over the distal radius or distal ulna.  Nontender over the TFCC.  Grasp strength is strong.  No swelling at the wrist.  Overlying skin is intact.  Appropriate conversation and affect.    I personally reviewed the patient updated x-rays that suggest tilting of the lunate and the possibility of underlying scapholunate ligament insufficiency.      Assessment: Centralized left wrist discomfort and x-ray suggested of wrist instability    Plan: MRI on the 3T MRI to evaluate the scapholunate ligament insufficiency is pending.  Follow-up  in clinic after the MRI to go over the results.                          Again, thank you for allowing me to participate in the care of your patient.      Sincerely,    Alexy Navarrete MD

## 2025-07-31 NOTE — PROGRESS NOTES
Sports Medicine Clinic Visit    PCP: No Ref-Primary, Physician    Jennifer I Billy is a 25 year old female who is seen  as self referral presenting with left wrist and hand pain    Patient for the last 3 months has noted centralized left wrist discomfort that bothers her with gripping and grasping.  She points to the dorsal central wrist as the area of pain.  She is not aware of a recent injury to the wrist, or a past history of significant wrist injury, or any she does not recall it.  However an x-ray of the hand and wrist 6/20/2025 suggested tilting of the lunate, avulsion fracture from the lunate.  Patient is limited to use of her left upper extremity due to right-sided CVA.    Injury: Gets a sharp pain in her hand with no BUD.     Location of Pain: left wrist   Duration of Pain:   Many Years   Rating of Pain: 8/10  Pain is better with: Nothing  Pain is worse with: certain movements   Additional Features: NA  Treatment so far consists of: Ibuprofen  Prior History of related problems: NA    There were no vitals taken for this visit.        Past medical history of sickle cell disease, asthma, CVA, anxiety       Patient past history of cerebral infarction (at 1 years of age) with right-sided hemiplegia deficits. Uses AFO device RLE.            EXAM: XR HAND LEFT G/E 3 VIEWS  LOCATION: [facility]  DATE: 6/20/2025     INDICATION: Pain, history of SCD  COMPARISON: None.                                                                      IMPRESSION: Left hand shows no evidence of an acute fracture. There is overlap of the proximal phalanx with the thumb metacarpal at the MCP joint on the AP view which can be seen with subluxation. Correlation with physical exam findings for laxity or   instability is recommended. Dedicated views of the thumb could assess further.      There is widening of the scapholunate interval and tilting of the lunate where there is a small bone fragment  within the wrist. Equivocal nondisplaced fracture through the dorsal lunate where there is a vague linear lucency on the lateral view.          PMH:  Past Medical History:   Diagnosis Date    Anxiety     Bleeding disorder     Blood clotting disorder     Cerebral infarction (H) 2015    Cognitive developmental delay     low IQ. 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 side (H)     right hand contractures    Iron overload due to repeated red blood cell transfusions     Migraines     Multiple subsegmental pulmonary emboli without acute cor pulmonale (H) 02/01/2021    Oppositional defiant behavior     Presence of intrauterine contraceptive device 2/18/2020    Superficial venous thrombosis of arm, right 03/25/2021    Uncomplicated asthma        Past Surgical History:   Procedure Laterality Date    AS INSERT TUNNELED CV 2 CATH W/O PORT/PUMP        CHOLECYSTECTOMY        CV RIGHT HEART CATH MEASUREMENTS RECORDED N/A 11/18/2021     Procedure: Right Heart Cath;  Surgeon: Jackson Stauffer MD;  Location:  HEART CARDIAC CATH LAB    INSERT PORT VASCULAR ACCESS Left 4/21/2021     Procedure: INSERTION, VASCULAR ACCESS PORT (NOT SURE ON SIDE UNTIL REMOVAL);  Surgeon: Rajan More MD;  Location: [facility] OR    IR CHEST PORT PLACEMENT > 5 YRS OF AGE   4/21/2021    IR CVC NON TUNNEL LINE REMOVAL   5/6/2021    IR CVC NON TUNNEL PLACEMENT > 5 YRS   04/07/2020    IR CVC NON TUNNEL PLACEMENT > 5 YRS   4/30/2021    IR CVC NON TUNNEL PLACEMENT > 5 YRS   9/7/2022    IR PORT REMOVAL LEFT   4/21/2021    REMOVE PORT VASCULAR ACCESS Left 4/21/2021     Procedure: REMOVAL, VASCULAR ACCESS PORT LEFT;  Surgeon: Rajan More MD;  Location: [facility] OR    REPAIR TENDON ELBOW Right 10/02/2019     Procedure: Right Elbow Flexor Lengthening, Flexor Pronator Slide Of Wrist and Finger, Thumb Adductor Lengthening;  Surgeon: Rob Portillo  Anai Colby MD;  [facility]    TONSILLECTOMY Bilateral 10/02/2019     Procedure: Bilateral Tonsillectomy;  Surgeon: Farhana Guy MD;  [facility]: [facility]    ZZC BREAST REDUCTION (INCLUDES LIPO) TIER 3         Active problem list:  Patient Active Problem List   Diagnosis    Moderate persistent asthma without complication    Constipation    Hemiplegia and hemiparesis following cerebral infarction affecting right dominant side (H)    Iron overload due to repeated red blood cell transfusions    Hemochromatosis due to repeated red blood cell transfusions    Cognitive developmental delay    Oppositional defiant behavior    Cerebral infarction (H)    Sickle cell pain crisis (H)    Generalized anxiety disorder with panic attacks    Overweight    Migraine headache    Hypoxemia    Hydrosalpinx    H/O: stroke    Gastritis    Depressive disorder    Other chronic pain    Allergic reaction    Acute pain    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)    History of stroke    Sickle cell crisis (H)    Multiple subsegmental pulmonary emboli without acute cor pulmonale (H)    Atypical squamous cells of undetermined significance on cytologic smear of cervix (ASC-US)    Nexplanon in place    Superficial venous thrombosis of arm, right    Spasticity    Spastic hemiplegia (H)    Feeling angry    Transient ischemic attack    Uses contraception    Pulmonary embolism, other, unspecified chronicity, unspecified whether acute cor pulmonale present (H)    Chronic pulmonary embolism without acute cor pulmonale, unspecified pulmonary embolism type (H)    Pleuritic chest pain    Subtherapeutic anticoagulation    Chest pain, unspecified type    Cerebral infarction, unspecified mechanism (H)    Abdominal discomfort    Dyspnea on exertion    Hypoxia    Vaginal bleeding    Anemia, unspecified type    Exposure to blood or body fluid    Acute pain of left knee    Meniscal injury, left, initial encounter       FH:  Family History   Problem Relation  Age of Onset    Sickle Cell Trait Mother     Hypertension Mother     Asthma Mother     Sickle Cell Trait Father     Glaucoma No family hx of     Macular Degeneration No family hx of     Diabetes No family hx of     Gout No family hx of        SH:  Social History     Socioeconomic History    Marital status: Single     Spouse name: Not on file    Number of children: Not on file    Years of education: Not on file    Highest education level: Not on file   Occupational History    Not on file   Tobacco Use    Smoking status: Never     Passive exposure: Never    Smokeless tobacco: Never   Substance and Sexual Activity    Alcohol use: Not Currently     Alcohol/week: 0.0 standard drinks of alcohol    Drug use: Never    Sexual activity: Not Currently     Partners: Male     Birth control/protection: Implant   Other Topics Concern    Parent/sibling w/ CABG, MI or angioplasty before 65F 55M? Not Asked   Social History Narrative    Lives with mom and extended family (mom is her PCA and ILS worker). She does still need a lot of help and does not live independently due to her significant SCD comorbidities and cognitive delay from stroke.     Social Drivers of Health     Financial Resource Strain: Low Risk  (7/21/2025)    Financial Resource Strain     Within the past 12 months, have you or your family members you live with been unable to get utilities (heat, electricity) when it was really needed?: No   Food Insecurity: Low Risk  (7/21/2025)    Food Insecurity     Within the past 12 months, did you worry that your food would run out before you got money to buy more?: No     Within the past 12 months, did the food you bought just not last and you didn t have money to get more?: No   Transportation Needs: Low Risk  (7/21/2025)    Transportation Needs     Within the past 12 months, has lack of transportation kept you from medical appointments, getting your medicines, non-medical meetings or appointments, work, or from getting things  that you need?: No   Physical Activity: Unknown (7/31/2025)    Exercise Vital Sign     Days of Exercise per Week: 5 days     Minutes of Exercise per Session: Not on file   Stress: No Stress Concern Present (7/21/2025)    [employer] [place] [employer] - Occupational Stress Questionnaire     Feeling of Stress : Only a little   Social Connections: Unknown (7/21/2025)    Social Connection and Isolation Panel [NHANES]     Frequency of Communication with Friends and Family: Not on file     Frequency of Social Gatherings with Friends and Family: Patient declined     Attends [employer] Services: Not on file     Active Member of Clubs or Organizations: Not on file     Attends Club or Organization Meetings: Not on file     Marital Status: Not on file   Interpersonal Safety: Low Risk  (2/12/2025)    Interpersonal Safety     Do you feel physically and emotionally safe where you currently live?: Yes     Within the past 12 months, have you been hit, slapped, kicked or otherwise physically hurt by someone?: No     Within the past 12 months, have you been humiliated or emotionally abused in other ways by your partner or ex-partner?: No   Housing Stability: Low Risk  (7/21/2025)    Housing Stability     Do you have housing? : Yes     Are you worried about losing your housing?: No       MEDS:  See EMR, reviewed  ALL:  See EMR, reviewed    REVIEW OF SYSTEMS:  CONSTITUTIONAL:NEGATIVE for fever, chills, change in weight  INTEGUMENTARY/SKIN: NEGATIVE for worrisome rashes, moles or lesions  EYES: NEGATIVE for vision changes or irritation  ENT/MOUTH: NEGATIVE for ear, mouth and throat problems  RESP:NEGATIVE for significant cough or SOB  BREAST: NEGATIVE for masses, tenderness or discharge  CV: NEGATIVE for chest pain, palpitations or peripheral edema  GI: NEGATIVE for nausea, abdominal pain, heartburn, or change in bowel habits  :NEGATIVE for frequency, dysuria, or hematuria  :NEGATIVE for frequency, dysuria, or  hematuria  NEURO: NEGATIVE for weakness, dizziness or paresthesias  ENDOCRINE: NEGATIVE for temperature intolerance, skin/hair changes  HEME/ALLERGY/IMMUNE: NEGATIVE for bleeding problems  PSYCHIATRIC: NEGATIVE for changes in mood or affect      Objective: Patient has mild tenderness in the centralized wrist near the lunate.  Talbot's test is negative.  Nontender directly over the scaphoid.  Nontender over the distal radius or distal ulna.  Nontender over the TFCC.  Grasp strength is strong.  No swelling at the wrist.  Overlying skin is intact.  Appropriate conversation and affect.    I personally reviewed the patient updated x-rays that suggest tilting of the lunate and the possibility of underlying scapholunate ligament insufficiency.      Assessment: Centralized left wrist discomfort and x-ray suggested of wrist instability    Plan: MRI on the 3T MRI to evaluate the scapholunate ligament insufficiency is pending.  Follow-up  in clinic after the MRI to go over the results.

## 2025-08-01 ENCOUNTER — HOSPITAL ENCOUNTER (EMERGENCY)
Facility: CLINIC | Age: 25
Discharge: HOME | End: 2025-08-02
Attending: FAMILY MEDICINE | Admitting: FAMILY MEDICINE
Payer: MEDICAID

## 2025-08-01 DIAGNOSIS — D57.00 SICKLE CELL DISEASE WITH CRISIS (H): Primary | ICD-10-CM

## 2025-08-01 PROCEDURE — 99284 EMERGENCY DEPT VISIT MOD MDM: CPT | Mod: 25 | Performed by: FAMILY MEDICINE

## 2025-08-01 PROCEDURE — 99285 EMERGENCY DEPT VISIT HI MDM: CPT | Performed by: FAMILY MEDICINE

## 2025-08-01 RX ORDER — KETOROLAC TROMETHAMINE 30 MG/ML
30 INJECTION, SOLUTION INTRAMUSCULAR; INTRAVENOUS ONCE
Status: COMPLETED | OUTPATIENT
Start: 2025-08-02 | End: 2025-08-02

## 2025-08-01 RX ORDER — SODIUM CHLORIDE, SODIUM LACTATE, POTASSIUM CHLORIDE, CALCIUM CHLORIDE 600; 310; 30; 20 MG/100ML; MG/100ML; MG/100ML; MG/100ML
INJECTION, SOLUTION INTRAVENOUS CONTINUOUS
Status: DISCONTINUED | OUTPATIENT
Start: 2025-08-02 | End: 2025-08-02 | Stop reason: HOSPADM

## 2025-08-01 RX ORDER — ONDANSETRON 2 MG/ML
8 INJECTION INTRAMUSCULAR; INTRAVENOUS ONCE
Status: COMPLETED | OUTPATIENT
Start: 2025-08-02 | End: 2025-08-02

## 2025-08-01 ASSESSMENT — ENCOUNTER SYMPTOMS: SICKLE CELL PAIN: 1

## 2025-08-02 ENCOUNTER — APPOINTMENT (OUTPATIENT)
Dept: GENERAL RADIOLOGY | Facility: CLINIC | Age: 25
End: 2025-08-02
Attending: FAMILY MEDICINE
Payer: MEDICAID

## 2025-08-02 ENCOUNTER — DOCUMENTATION ONLY (OUTPATIENT)
Dept: ONCOLOGY | Facility: CLINIC | Age: 25
End: 2025-08-02

## 2025-08-02 VITALS
HEART RATE: 90 BPM | DIASTOLIC BLOOD PRESSURE: 70 MMHG | SYSTOLIC BLOOD PRESSURE: 129 MMHG | TEMPERATURE: 97.4 F | OXYGEN SATURATION: 93 % | RESPIRATION RATE: 14 BRPM

## 2025-08-02 LAB
ALBUMIN SERPL BCG-MCNC: 4.5 G/DL (ref 3.5–5.2)
ALP SERPL-CCNC: 61 U/L (ref 40–150)
ALT SERPL W P-5'-P-CCNC: 47 U/L (ref 0–50)
ANION GAP SERPL CALCULATED.3IONS-SCNC: 12 MMOL/L (ref 7–15)
AST SERPL W P-5'-P-CCNC: 65 U/L (ref 0–45)
BASOPHILS # BLD AUTO: 0.3 10E3/UL (ref 0–0.2)
BASOPHILS NFR BLD AUTO: 2 %
BILIRUB SERPL-MCNC: 3 MG/DL
BUN SERPL-MCNC: 11.2 MG/DL (ref 6–20)
CALCIUM SERPL-MCNC: 8.7 MG/DL (ref 8.8–10.4)
CHLORIDE SERPL-SCNC: 105 MMOL/L (ref 98–107)
CREAT SERPL-MCNC: 0.64 MG/DL (ref 0.51–0.95)
EGFRCR SERPLBLD CKD-EPI 2021: >90 ML/MIN/1.73M2
EOSINOPHIL # BLD AUTO: 0.5 10E3/UL (ref 0–0.7)
EOSINOPHIL NFR BLD AUTO: 3 %
ERYTHROCYTE [DISTWIDTH] IN BLOOD BY AUTOMATED COUNT: 25.6 % (ref 10–15)
GLUCOSE SERPL-MCNC: 96 MG/DL (ref 70–99)
HCO3 SERPL-SCNC: 22 MMOL/L (ref 22–29)
HCT VFR BLD AUTO: 18.9 % (ref 35–47)
HGB BLD-MCNC: 6.9 G/DL (ref 11.7–15.7)
IMM GRANULOCYTES # BLD: 0.1 10E3/UL
IMM GRANULOCYTES NFR BLD: 1 %
LYMPHOCYTES # BLD AUTO: 3.2 10E3/UL (ref 0.8–5.3)
LYMPHOCYTES NFR BLD AUTO: 22 %
MCH RBC QN AUTO: 31.9 PG (ref 26.5–33)
MCHC RBC AUTO-ENTMCNC: 36.5 G/DL (ref 31.5–36.5)
MCV RBC AUTO: 88 FL (ref 78–100)
MONOCYTES # BLD AUTO: 1.2 10E3/UL (ref 0–1.3)
MONOCYTES NFR BLD AUTO: 9 %
NEUTROPHILS # BLD AUTO: 9 10E3/UL (ref 1.6–8.3)
NEUTROPHILS NFR BLD AUTO: 63 %
NRBC # BLD AUTO: 0.5 10E3/UL
NRBC BLD AUTO-RTO: 4 /100
PLATELET # BLD AUTO: 430 10E3/UL (ref 150–450)
POTASSIUM SERPL-SCNC: 3.9 MMOL/L (ref 3.4–5.3)
PROT SERPL-MCNC: 7.4 G/DL (ref 6.4–8.3)
RBC # BLD AUTO: 2.16 10E6/UL (ref 3.8–5.2)
RETICS # AUTO: 0.43 10E6/UL (ref 0.03–0.1)
RETICS/RBC NFR AUTO: 19.7 % (ref 0.5–2)
SODIUM SERPL-SCNC: 139 MMOL/L (ref 135–145)
WBC # BLD AUTO: 14.3 10E3/UL (ref 4–11)

## 2025-08-02 PROCEDURE — 85045 AUTOMATED RETICULOCYTE COUNT: CPT | Performed by: FAMILY MEDICINE

## 2025-08-02 PROCEDURE — 250N000011 HC RX IP 250 OP 636: Performed by: FAMILY MEDICINE

## 2025-08-02 PROCEDURE — 250N000011 HC RX IP 250 OP 636: Performed by: EMERGENCY MEDICINE

## 2025-08-02 PROCEDURE — 96375 TX/PRO/DX INJ NEW DRUG ADDON: CPT | Performed by: FAMILY MEDICINE

## 2025-08-02 PROCEDURE — 71046 X-RAY EXAM CHEST 2 VIEWS: CPT

## 2025-08-02 PROCEDURE — 96361 HYDRATE IV INFUSION ADD-ON: CPT | Performed by: FAMILY MEDICINE

## 2025-08-02 PROCEDURE — 96374 THER/PROPH/DIAG INJ IV PUSH: CPT | Performed by: FAMILY MEDICINE

## 2025-08-02 PROCEDURE — 85025 COMPLETE CBC W/AUTO DIFF WBC: CPT | Performed by: FAMILY MEDICINE

## 2025-08-02 PROCEDURE — 80053 COMPREHEN METABOLIC PANEL: CPT | Performed by: FAMILY MEDICINE

## 2025-08-02 PROCEDURE — 258N000003 HC RX IP 258 OP 636: Performed by: FAMILY MEDICINE

## 2025-08-02 PROCEDURE — 96376 TX/PRO/DX INJ SAME DRUG ADON: CPT | Performed by: FAMILY MEDICINE

## 2025-08-02 PROCEDURE — 36415 COLL VENOUS BLD VENIPUNCTURE: CPT | Performed by: FAMILY MEDICINE

## 2025-08-02 RX ORDER — HYDROMORPHONE HYDROCHLORIDE 1 MG/ML
1 INJECTION, SOLUTION INTRAMUSCULAR; INTRAVENOUS; SUBCUTANEOUS ONCE
Refills: 0 | Status: COMPLETED | OUTPATIENT
Start: 2025-08-02 | End: 2025-08-02

## 2025-08-02 RX ORDER — HEPARIN SODIUM (PORCINE) LOCK FLUSH IV SOLN 100 UNIT/ML 100 UNIT/ML
5-10 SOLUTION INTRAVENOUS
Status: DISCONTINUED | OUTPATIENT
Start: 2025-08-02 | End: 2025-08-02 | Stop reason: HOSPADM

## 2025-08-02 RX ADMIN — HEPARIN 5 ML: 100 SYRINGE at 05:40

## 2025-08-02 RX ADMIN — HYDROMORPHONE HYDROCHLORIDE 1 MG: 1 INJECTION, SOLUTION INTRAMUSCULAR; INTRAVENOUS; SUBCUTANEOUS at 02:09

## 2025-08-02 RX ADMIN — HYDROMORPHONE HYDROCHLORIDE 1 MG: 1 INJECTION, SOLUTION INTRAMUSCULAR; INTRAVENOUS; SUBCUTANEOUS at 03:40

## 2025-08-02 RX ADMIN — HYDROMORPHONE HYDROCHLORIDE 1 MG: 1 INJECTION, SOLUTION INTRAMUSCULAR; INTRAVENOUS; SUBCUTANEOUS at 00:51

## 2025-08-02 RX ADMIN — KETOROLAC TROMETHAMINE 30 MG: 30 INJECTION, SOLUTION INTRAMUSCULAR at 00:29

## 2025-08-02 RX ADMIN — ONDANSETRON 8 MG: 2 INJECTION INTRAMUSCULAR; INTRAVENOUS at 00:30

## 2025-08-02 RX ADMIN — SODIUM CHLORIDE, SODIUM LACTATE, POTASSIUM CHLORIDE, AND CALCIUM CHLORIDE: .6; .31; .03; .02 INJECTION, SOLUTION INTRAVENOUS at 00:30

## 2025-08-02 ASSESSMENT — ACTIVITIES OF DAILY LIVING (ADL)
ADLS_ACUITY_SCORE: 58

## 2025-08-04 ENCOUNTER — INFUSION THERAPY VISIT (OUTPATIENT)
Dept: INFUSION THERAPY | Facility: CLINIC | Age: 25
End: 2025-08-04
Payer: MEDICAID

## 2025-08-04 ENCOUNTER — PATIENT OUTREACH (OUTPATIENT)
Dept: CARE COORDINATION | Facility: CLINIC | Age: 25
End: 2025-08-04

## 2025-08-04 ENCOUNTER — NURSE TRIAGE (OUTPATIENT)
Dept: ONCOLOGY | Facility: CLINIC | Age: 25
End: 2025-08-04

## 2025-08-04 VITALS
TEMPERATURE: 98.1 F | RESPIRATION RATE: 14 BRPM | SYSTOLIC BLOOD PRESSURE: 136 MMHG | DIASTOLIC BLOOD PRESSURE: 79 MMHG | HEART RATE: 72 BPM | OXYGEN SATURATION: 96 %

## 2025-08-04 DIAGNOSIS — E78.1 HYPERTRIGLYCERIDEMIA: ICD-10-CM

## 2025-08-04 DIAGNOSIS — D57.1 HB-SS DISEASE WITHOUT CRISIS (H): ICD-10-CM

## 2025-08-04 DIAGNOSIS — D57.00 SICKLE CELL DISEASE WITH CRISIS (H): ICD-10-CM

## 2025-08-04 DIAGNOSIS — D57.00 SICKLE CELL PAIN CRISIS (H): Primary | ICD-10-CM

## 2025-08-04 DIAGNOSIS — G81.10 SPASTIC HEMIPLEGIA, UNSPECIFIED ETIOLOGY, UNSPECIFIED LATERALITY (H): ICD-10-CM

## 2025-08-04 LAB
ALBUMIN SERPL BCG-MCNC: 4.7 G/DL (ref 3.5–5.2)
ALP SERPL-CCNC: 59 U/L (ref 40–150)
ALT SERPL W P-5'-P-CCNC: 43 U/L (ref 0–50)
AST SERPL W P-5'-P-CCNC: 63 U/L (ref 0–45)
BASOPHILS # BLD MANUAL: 0.3 10E3/UL (ref 0–0.2)
BASOPHILS NFR BLD MANUAL: 2 %
BILIRUB SERPL-MCNC: 2.9 MG/DL
BILIRUBIN DIRECT (ROCHE PRO & PURE): 0.73 MG/DL (ref 0–0.45)
CHOLEST SERPL-MCNC: 145 MG/DL
EOSINOPHIL # BLD MANUAL: 0.6 10E3/UL (ref 0–0.7)
EOSINOPHIL NFR BLD MANUAL: 5 %
ERYTHROCYTE [DISTWIDTH] IN BLOOD BY AUTOMATED COUNT: 26.8 % (ref 10–15)
FASTING STATUS PATIENT QL REPORTED: YES
FASTING STATUS PATIENT QL REPORTED: YES
HCT VFR BLD AUTO: 21.2 % (ref 35–47)
HDLC SERPL-MCNC: 33 MG/DL
HGB BLD-MCNC: 7.4 G/DL (ref 11.7–15.7)
HOWELL-JOLLY BOD BLD QL SMEAR: PRESENT
LDLC SERPL CALC-MCNC: 87 MG/DL
LYMPHOCYTES # BLD MANUAL: 2.1 10E3/UL (ref 0.8–5.3)
LYMPHOCYTES NFR BLD MANUAL: 16 %
MCH RBC QN AUTO: 31.5 PG (ref 26.5–33)
MCHC RBC AUTO-ENTMCNC: 34.9 G/DL (ref 31.5–36.5)
MCV RBC AUTO: 90 FL (ref 78–100)
MONOCYTES # BLD MANUAL: 1.3 10E3/UL (ref 0–1.3)
MONOCYTES NFR BLD MANUAL: 10 %
NEUTROPHILS # BLD MANUAL: 8.6 10E3/UL (ref 1.6–8.3)
NEUTROPHILS NFR BLD MANUAL: 67 %
NONHDLC SERPL-MCNC: 112 MG/DL
NRBC # BLD AUTO: 1.5 10E3/UL
NRBC BLD MANUAL-RTO: 12 %
PLAT MORPH BLD: ABNORMAL
PLATELET # BLD AUTO: 456 10E3/UL (ref 150–450)
POLYCHROMASIA BLD QL SMEAR: SLIGHT
PROT SERPL-MCNC: 7.8 G/DL (ref 6.4–8.3)
RBC # BLD AUTO: 2.35 10E6/UL (ref 3.8–5.2)
RBC MORPH BLD: ABNORMAL
SICKLE CELLS BLD QL SMEAR: ABNORMAL
TARGETS BLD QL SMEAR: SLIGHT
TRIGL SERPL-MCNC: 127 MG/DL
TRIGL SERPL-MCNC: 130 MG/DL
WBC # BLD AUTO: 12.9 10E3/UL (ref 4–11)

## 2025-08-04 PROCEDURE — 250N000013 HC RX MED GY IP 250 OP 250 PS 637: Performed by: PEDIATRICS

## 2025-08-04 PROCEDURE — 250N000011 HC RX IP 250 OP 636: Performed by: PEDIATRICS

## 2025-08-04 PROCEDURE — 80076 HEPATIC FUNCTION PANEL: CPT

## 2025-08-04 PROCEDURE — 36415 COLL VENOUS BLD VENIPUNCTURE: CPT

## 2025-08-04 PROCEDURE — 80061 LIPID PANEL: CPT

## 2025-08-04 PROCEDURE — 96376 TX/PRO/DX INJ SAME DRUG ADON: CPT

## 2025-08-04 PROCEDURE — 85007 BL SMEAR W/DIFF WBC COUNT: CPT

## 2025-08-04 PROCEDURE — 36591 DRAW BLOOD OFF VENOUS DEVICE: CPT

## 2025-08-04 PROCEDURE — 96375 TX/PRO/DX INJ NEW DRUG ADDON: CPT

## 2025-08-04 PROCEDURE — 85018 HEMOGLOBIN: CPT

## 2025-08-04 PROCEDURE — 96361 HYDRATE IV INFUSION ADD-ON: CPT

## 2025-08-04 PROCEDURE — 96374 THER/PROPH/DIAG INJ IV PUSH: CPT

## 2025-08-04 PROCEDURE — 258N000003 HC RX IP 258 OP 636: Performed by: PEDIATRICS

## 2025-08-04 RX ORDER — KETOROLAC TROMETHAMINE 30 MG/ML
30 INJECTION, SOLUTION INTRAMUSCULAR; INTRAVENOUS ONCE
Status: COMPLETED | OUTPATIENT
Start: 2025-08-04 | End: 2025-08-04

## 2025-08-04 RX ORDER — KETOROLAC TROMETHAMINE 30 MG/ML
30 INJECTION, SOLUTION INTRAMUSCULAR; INTRAVENOUS ONCE
Status: CANCELLED
Start: 2025-11-01 | End: 2025-11-01

## 2025-08-04 RX ORDER — ONDANSETRON 4 MG/1
8 TABLET, FILM COATED ORAL
Status: CANCELLED
Start: 2025-11-01

## 2025-08-04 RX ORDER — HEPARIN SODIUM (PORCINE) LOCK FLUSH IV SOLN 100 UNIT/ML 100 UNIT/ML
5 SOLUTION INTRAVENOUS
Status: DISCONTINUED | OUTPATIENT
Start: 2025-08-04 | End: 2025-08-04 | Stop reason: HOSPADM

## 2025-08-04 RX ORDER — ONDANSETRON 2 MG/ML
8 INJECTION INTRAMUSCULAR; INTRAVENOUS EVERY 6 HOURS PRN
Status: DISCONTINUED | OUTPATIENT
Start: 2025-08-04 | End: 2025-08-04 | Stop reason: HOSPADM

## 2025-08-04 RX ORDER — ONDANSETRON 2 MG/ML
8 INJECTION INTRAMUSCULAR; INTRAVENOUS EVERY 6 HOURS PRN
Status: CANCELLED
Start: 2025-11-01

## 2025-08-04 RX ORDER — DIPHENHYDRAMINE HCL 25 MG
50 CAPSULE ORAL
Status: CANCELLED
Start: 2025-11-01

## 2025-08-04 RX ORDER — HEPARIN SODIUM,PORCINE 10 UNIT/ML
5 VIAL (ML) INTRAVENOUS
Status: CANCELLED | OUTPATIENT
Start: 2025-11-01

## 2025-08-04 RX ORDER — OXYCODONE HYDROCHLORIDE 10 MG/1
10 TABLET ORAL EVERY 6 HOURS PRN
Qty: 18 TABLET | Refills: 0 | Status: SHIPPED | OUTPATIENT
Start: 2025-08-04

## 2025-08-04 RX ORDER — HEPARIN SODIUM (PORCINE) LOCK FLUSH IV SOLN 100 UNIT/ML 100 UNIT/ML
5 SOLUTION INTRAVENOUS
Status: CANCELLED | OUTPATIENT
Start: 2025-11-01

## 2025-08-04 RX ORDER — DIPHENHYDRAMINE HCL 25 MG
50 CAPSULE ORAL
Status: COMPLETED | OUTPATIENT
Start: 2025-08-04 | End: 2025-08-04

## 2025-08-04 RX ADMIN — HYDROMORPHONE HYDROCHLORIDE 1 MG: 1 INJECTION, SOLUTION INTRAMUSCULAR; INTRAVENOUS; SUBCUTANEOUS at 14:44

## 2025-08-04 RX ADMIN — SODIUM CHLORIDE, SODIUM LACTATE, POTASSIUM CHLORIDE, AND CALCIUM CHLORIDE 1000 ML: .6; .31; .03; .02 INJECTION, SOLUTION INTRAVENOUS at 14:39

## 2025-08-04 RX ADMIN — HYDROMORPHONE HYDROCHLORIDE 1 MG: 1 INJECTION, SOLUTION INTRAMUSCULAR; INTRAVENOUS; SUBCUTANEOUS at 15:47

## 2025-08-04 RX ADMIN — Medication 5 ML: at 17:12

## 2025-08-04 RX ADMIN — DIPHENHYDRAMINE HYDROCHLORIDE 50 MG: 25 CAPSULE ORAL at 14:42

## 2025-08-04 RX ADMIN — KETOROLAC TROMETHAMINE 30 MG: 30 INJECTION, SOLUTION INTRAMUSCULAR; INTRAVENOUS at 14:46

## 2025-08-04 RX ADMIN — HYDROMORPHONE HYDROCHLORIDE 1 MG: 1 INJECTION, SOLUTION INTRAMUSCULAR; INTRAVENOUS; SUBCUTANEOUS at 16:46

## 2025-08-04 RX ADMIN — ONDANSETRON 8 MG: 2 INJECTION INTRAMUSCULAR; INTRAVENOUS at 14:48

## 2025-08-04 ASSESSMENT — PAIN SCALES - GENERAL: PAINLEVEL_OUTOF10: SEVERE PAIN (9)

## 2025-08-06 ENCOUNTER — HOSPITAL ENCOUNTER (EMERGENCY)
Facility: CLINIC | Age: 25
Discharge: HOME | End: 2025-08-06
Attending: STUDENT IN AN ORGANIZED HEALTH CARE EDUCATION/TRAINING PROGRAM | Admitting: STUDENT IN AN ORGANIZED HEALTH CARE EDUCATION/TRAINING PROGRAM
Payer: MEDICAID

## 2025-08-06 VITALS
TEMPERATURE: 98.2 F | HEART RATE: 85 BPM | RESPIRATION RATE: 16 BRPM | BODY MASS INDEX: 26.73 KG/M2 | OXYGEN SATURATION: 94 % | SYSTOLIC BLOOD PRESSURE: 125 MMHG | HEIGHT: 64 IN | WEIGHT: 156.6 LBS | DIASTOLIC BLOOD PRESSURE: 85 MMHG

## 2025-08-06 DIAGNOSIS — R10.13 ABDOMINAL PAIN, EPIGASTRIC: Primary | ICD-10-CM

## 2025-08-06 DIAGNOSIS — D57.00 SICKLE CELL DISEASE WITH CRISIS (H): ICD-10-CM

## 2025-08-06 LAB
ALBUMIN SERPL BCG-MCNC: 4.5 G/DL (ref 3.5–5.2)
ALP SERPL-CCNC: 55 U/L (ref 40–150)
ALT SERPL W P-5'-P-CCNC: 41 U/L (ref 0–50)
ANION GAP SERPL CALCULATED.3IONS-SCNC: 12 MMOL/L (ref 7–15)
AST SERPL W P-5'-P-CCNC: 51 U/L (ref 0–45)
BASOPHILS # BLD AUTO: 0.2 10E3/UL (ref 0–0.2)
BASOPHILS NFR BLD AUTO: 1 %
BILIRUB SERPL-MCNC: 3 MG/DL
BUN SERPL-MCNC: 7.5 MG/DL (ref 6–20)
CALCIUM SERPL-MCNC: 8.2 MG/DL (ref 8.8–10.4)
CHLORIDE SERPL-SCNC: 105 MMOL/L (ref 98–107)
CREAT SERPL-MCNC: 0.54 MG/DL (ref 0.51–0.95)
EGFRCR SERPLBLD CKD-EPI 2021: >90 ML/MIN/1.73M2
EOSINOPHIL # BLD AUTO: 0.5 10E3/UL (ref 0–0.7)
EOSINOPHIL NFR BLD AUTO: 4 %
ERYTHROCYTE [DISTWIDTH] IN BLOOD BY AUTOMATED COUNT: 25.2 % (ref 10–15)
GLUCOSE SERPL-MCNC: 99 MG/DL (ref 70–99)
HCG SERPL QL: NEGATIVE
HCO3 SERPL-SCNC: 21 MMOL/L (ref 22–29)
HCT VFR BLD AUTO: 19 % (ref 35–47)
HGB BLD-MCNC: 7 G/DL (ref 11.7–15.7)
IMM GRANULOCYTES # BLD: 0.1 10E3/UL
IMM GRANULOCYTES NFR BLD: 1 %
LYMPHOCYTES # BLD AUTO: 2.8 10E3/UL (ref 0.8–5.3)
LYMPHOCYTES NFR BLD AUTO: 24 %
MCH RBC QN AUTO: 32.3 PG (ref 26.5–33)
MCHC RBC AUTO-ENTMCNC: 36.8 G/DL (ref 31.5–36.5)
MCV RBC AUTO: 88 FL (ref 78–100)
MONOCYTES # BLD AUTO: 1 10E3/UL (ref 0–1.3)
MONOCYTES NFR BLD AUTO: 8 %
NEUTROPHILS # BLD AUTO: 7.3 10E3/UL (ref 1.6–8.3)
NEUTROPHILS NFR BLD AUTO: 62 %
NRBC # BLD AUTO: 0.1 10E3/UL
NRBC BLD AUTO-RTO: 1 /100
PLATELET # BLD AUTO: 443 10E3/UL (ref 150–450)
POTASSIUM SERPL-SCNC: 3.8 MMOL/L (ref 3.4–5.3)
PROT SERPL-MCNC: 7.2 G/DL (ref 6.4–8.3)
RBC # BLD AUTO: 2.17 10E6/UL (ref 3.8–5.2)
SODIUM SERPL-SCNC: 138 MMOL/L (ref 135–145)
WBC # BLD AUTO: 11.8 10E3/UL (ref 4–11)

## 2025-08-06 PROCEDURE — 36415 COLL VENOUS BLD VENIPUNCTURE: CPT | Performed by: STUDENT IN AN ORGANIZED HEALTH CARE EDUCATION/TRAINING PROGRAM

## 2025-08-06 PROCEDURE — 99284 EMERGENCY DEPT VISIT MOD MDM: CPT | Mod: 25 | Performed by: STUDENT IN AN ORGANIZED HEALTH CARE EDUCATION/TRAINING PROGRAM

## 2025-08-06 PROCEDURE — 96374 THER/PROPH/DIAG INJ IV PUSH: CPT

## 2025-08-06 PROCEDURE — 84703 CHORIONIC GONADOTROPIN ASSAY: CPT | Performed by: STUDENT IN AN ORGANIZED HEALTH CARE EDUCATION/TRAINING PROGRAM

## 2025-08-06 PROCEDURE — 85004 AUTOMATED DIFF WBC COUNT: CPT | Performed by: STUDENT IN AN ORGANIZED HEALTH CARE EDUCATION/TRAINING PROGRAM

## 2025-08-06 PROCEDURE — 96375 TX/PRO/DX INJ NEW DRUG ADDON: CPT

## 2025-08-06 PROCEDURE — 80053 COMPREHEN METABOLIC PANEL: CPT | Performed by: STUDENT IN AN ORGANIZED HEALTH CARE EDUCATION/TRAINING PROGRAM

## 2025-08-06 PROCEDURE — 250N000011 HC RX IP 250 OP 636: Performed by: STUDENT IN AN ORGANIZED HEALTH CARE EDUCATION/TRAINING PROGRAM

## 2025-08-06 PROCEDURE — 96376 TX/PRO/DX INJ SAME DRUG ADON: CPT

## 2025-08-06 RX ORDER — HEPARIN SODIUM,PORCINE 10 UNIT/ML
5-10 VIAL (ML) INTRAVENOUS
Status: DISCONTINUED | OUTPATIENT
Start: 2025-08-06 | End: 2025-08-06

## 2025-08-06 RX ORDER — HEPARIN SODIUM (PORCINE) LOCK FLUSH IV SOLN 100 UNIT/ML 100 UNIT/ML
5 SOLUTION INTRAVENOUS
Status: DISCONTINUED | OUTPATIENT
Start: 2025-08-06 | End: 2025-08-06 | Stop reason: HOSPADM

## 2025-08-06 RX ORDER — KETOROLAC TROMETHAMINE 30 MG/ML
30 INJECTION, SOLUTION INTRAMUSCULAR; INTRAVENOUS ONCE
Status: COMPLETED | OUTPATIENT
Start: 2025-08-06 | End: 2025-08-06

## 2025-08-06 RX ORDER — ONDANSETRON 2 MG/ML
8 INJECTION INTRAMUSCULAR; INTRAVENOUS ONCE
Status: COMPLETED | OUTPATIENT
Start: 2025-08-06 | End: 2025-08-06

## 2025-08-06 RX ADMIN — HYDROMORPHONE HYDROCHLORIDE 1 MG: 1 INJECTION, SOLUTION INTRAMUSCULAR; INTRAVENOUS; SUBCUTANEOUS at 01:21

## 2025-08-06 RX ADMIN — HYDROMORPHONE HYDROCHLORIDE 1 MG: 1 INJECTION, SOLUTION INTRAMUSCULAR; INTRAVENOUS; SUBCUTANEOUS at 02:40

## 2025-08-06 RX ADMIN — HYDROMORPHONE HYDROCHLORIDE 1 MG: 1 INJECTION, SOLUTION INTRAMUSCULAR; INTRAVENOUS; SUBCUTANEOUS at 03:49

## 2025-08-06 RX ADMIN — ONDANSETRON 8 MG: 2 INJECTION INTRAMUSCULAR; INTRAVENOUS at 01:21

## 2025-08-06 RX ADMIN — KETOROLAC TROMETHAMINE 30 MG: 30 INJECTION, SOLUTION INTRAMUSCULAR at 01:21

## 2025-08-06 RX ADMIN — HEPARIN 5 ML: 100 SYRINGE at 06:37

## 2025-08-06 ASSESSMENT — ACTIVITIES OF DAILY LIVING (ADL)
ADLS_ACUITY_SCORE: 58

## 2025-08-07 ENCOUNTER — PATIENT OUTREACH (OUTPATIENT)
Dept: CARE COORDINATION | Facility: CLINIC | Age: 25
End: 2025-08-07
Payer: MEDICAID

## 2025-08-07 ENCOUNTER — INFUSION THERAPY VISIT (OUTPATIENT)
Dept: INFUSION THERAPY | Facility: CLINIC | Age: 25
End: 2025-08-07
Attending: PEDIATRICS
Payer: MEDICAID

## 2025-08-07 ENCOUNTER — NURSE TRIAGE (OUTPATIENT)
Dept: ONCOLOGY | Facility: CLINIC | Age: 25
End: 2025-08-07
Payer: MEDICAID

## 2025-08-07 VITALS — DIASTOLIC BLOOD PRESSURE: 73 MMHG | HEART RATE: 80 BPM | TEMPERATURE: 98.1 F | SYSTOLIC BLOOD PRESSURE: 125 MMHG

## 2025-08-07 DIAGNOSIS — G81.10 SPASTIC HEMIPLEGIA, UNSPECIFIED ETIOLOGY, UNSPECIFIED LATERALITY (H): ICD-10-CM

## 2025-08-07 DIAGNOSIS — D57.00 SICKLE CELL PAIN CRISIS (H): Primary | ICD-10-CM

## 2025-08-07 PROCEDURE — 250N000011 HC RX IP 250 OP 636: Performed by: PEDIATRICS

## 2025-08-07 PROCEDURE — 250N000013 HC RX MED GY IP 250 OP 250 PS 637: Performed by: PEDIATRICS

## 2025-08-07 PROCEDURE — 258N000003 HC RX IP 258 OP 636: Performed by: PEDIATRICS

## 2025-08-07 RX ORDER — ONDANSETRON 2 MG/ML
8 INJECTION INTRAMUSCULAR; INTRAVENOUS EVERY 6 HOURS PRN
Start: 2025-11-01

## 2025-08-07 RX ORDER — HEPARIN SODIUM (PORCINE) LOCK FLUSH IV SOLN 100 UNIT/ML 100 UNIT/ML
5 SOLUTION INTRAVENOUS
Status: DISCONTINUED | OUTPATIENT
Start: 2025-08-07 | End: 2025-08-07 | Stop reason: HOSPADM

## 2025-08-07 RX ORDER — DIPHENHYDRAMINE HCL 25 MG
50 CAPSULE ORAL
Status: COMPLETED | OUTPATIENT
Start: 2025-08-07 | End: 2025-08-07

## 2025-08-07 RX ORDER — HEPARIN SODIUM,PORCINE 10 UNIT/ML
5 VIAL (ML) INTRAVENOUS
Status: DISCONTINUED | OUTPATIENT
Start: 2025-08-07 | End: 2025-08-07 | Stop reason: HOSPADM

## 2025-08-07 RX ORDER — DIPHENHYDRAMINE HCL 25 MG
50 CAPSULE ORAL
Start: 2025-11-01

## 2025-08-07 RX ORDER — ONDANSETRON 8 MG/1
8 TABLET, FILM COATED ORAL
Start: 2025-11-01

## 2025-08-07 RX ORDER — KETOROLAC TROMETHAMINE 30 MG/ML
30 INJECTION, SOLUTION INTRAMUSCULAR; INTRAVENOUS ONCE
Status: COMPLETED | OUTPATIENT
Start: 2025-08-07 | End: 2025-08-07

## 2025-08-07 RX ORDER — HEPARIN SODIUM (PORCINE) LOCK FLUSH IV SOLN 100 UNIT/ML 100 UNIT/ML
5 SOLUTION INTRAVENOUS
OUTPATIENT
Start: 2025-11-01

## 2025-08-07 RX ORDER — KETOROLAC TROMETHAMINE 30 MG/ML
30 INJECTION, SOLUTION INTRAMUSCULAR; INTRAVENOUS ONCE
Start: 2025-11-01 | End: 2025-11-01

## 2025-08-07 RX ORDER — HEPARIN SODIUM,PORCINE 10 UNIT/ML
5 VIAL (ML) INTRAVENOUS
OUTPATIENT
Start: 2025-11-01

## 2025-08-07 RX ORDER — ONDANSETRON 2 MG/ML
8 INJECTION INTRAMUSCULAR; INTRAVENOUS EVERY 6 HOURS PRN
Status: DISCONTINUED | OUTPATIENT
Start: 2025-08-07 | End: 2025-08-07 | Stop reason: HOSPADM

## 2025-08-07 RX ADMIN — HEPARIN 5 ML: 100 SYRINGE at 12:25

## 2025-08-07 RX ADMIN — HYDROMORPHONE HYDROCHLORIDE 1 MG: 1 INJECTION, SOLUTION INTRAMUSCULAR; INTRAVENOUS; SUBCUTANEOUS at 11:27

## 2025-08-07 RX ADMIN — DIPHENHYDRAMINE HYDROCHLORIDE 50 MG: 25 CAPSULE ORAL at 09:16

## 2025-08-07 RX ADMIN — HYDROMORPHONE HYDROCHLORIDE 1 MG: 1 INJECTION, SOLUTION INTRAMUSCULAR; INTRAVENOUS; SUBCUTANEOUS at 09:31

## 2025-08-07 RX ADMIN — HYDROMORPHONE HYDROCHLORIDE 1 MG: 1 INJECTION, SOLUTION INTRAMUSCULAR; INTRAVENOUS; SUBCUTANEOUS at 10:32

## 2025-08-07 RX ADMIN — KETOROLAC TROMETHAMINE 30 MG: 30 INJECTION, SOLUTION INTRAMUSCULAR at 10:28

## 2025-08-07 RX ADMIN — ONDANSETRON 8 MG: 2 INJECTION INTRAMUSCULAR; INTRAVENOUS at 09:33

## 2025-08-07 RX ADMIN — SODIUM CHLORIDE, SODIUM LACTATE, POTASSIUM CHLORIDE, AND CALCIUM CHLORIDE 1000 ML: .6; .31; .03; .02 INJECTION, SOLUTION INTRAVENOUS at 09:30

## 2025-08-07 ASSESSMENT — PAIN SCALES - GENERAL: PAINLEVEL_OUTOF10: SEVERE PAIN (8)

## 2025-08-08 ENCOUNTER — PATIENT OUTREACH (OUTPATIENT)
Dept: ONCOLOGY | Facility: CLINIC | Age: 25
End: 2025-08-08

## 2025-08-09 ENCOUNTER — HOSPITAL ENCOUNTER (EMERGENCY)
Facility: CLINIC | Age: 25
Discharge: HOME | End: 2025-08-09
Attending: EMERGENCY MEDICINE | Admitting: EMERGENCY MEDICINE
Payer: MEDICAID

## 2025-08-09 VITALS
HEART RATE: 90 BPM | BODY MASS INDEX: 26.8 KG/M2 | RESPIRATION RATE: 16 BRPM | HEIGHT: 64 IN | SYSTOLIC BLOOD PRESSURE: 122 MMHG | DIASTOLIC BLOOD PRESSURE: 66 MMHG | OXYGEN SATURATION: 92 % | WEIGHT: 157 LBS | TEMPERATURE: 98.7 F

## 2025-08-09 DIAGNOSIS — D57.00 SICKLE CELL PAIN CRISIS (H): Primary | ICD-10-CM

## 2025-08-09 DIAGNOSIS — M79.622 PAIN OF LEFT UPPER ARM: ICD-10-CM

## 2025-08-09 LAB
ALBUMIN SERPL BCG-MCNC: 4.2 G/DL (ref 3.5–5.2)
ALP SERPL-CCNC: 60 U/L (ref 40–150)
ALT SERPL W P-5'-P-CCNC: 29 U/L (ref 0–50)
ANION GAP SERPL CALCULATED.3IONS-SCNC: 11 MMOL/L (ref 7–15)
AST SERPL W P-5'-P-CCNC: 49 U/L (ref 0–45)
BASOPHILS # BLD AUTO: 0.2 10E3/UL (ref 0–0.2)
BASOPHILS NFR BLD AUTO: 2 %
BILIRUB SERPL-MCNC: 3.5 MG/DL
BUN SERPL-MCNC: 8.7 MG/DL (ref 6–20)
CALCIUM SERPL-MCNC: 8.2 MG/DL (ref 8.8–10.4)
CHLORIDE SERPL-SCNC: 105 MMOL/L (ref 98–107)
CREAT SERPL-MCNC: 0.54 MG/DL (ref 0.51–0.95)
EGFRCR SERPLBLD CKD-EPI 2021: >90 ML/MIN/1.73M2
EOSINOPHIL # BLD AUTO: 0.4 10E3/UL (ref 0–0.7)
EOSINOPHIL NFR BLD AUTO: 4 %
ERYTHROCYTE [DISTWIDTH] IN BLOOD BY AUTOMATED COUNT: 24.4 % (ref 10–15)
GLUCOSE SERPL-MCNC: 91 MG/DL (ref 70–99)
HCO3 SERPL-SCNC: 21 MMOL/L (ref 22–29)
HCT VFR BLD AUTO: 19 % (ref 35–47)
HGB BLD-MCNC: 7 G/DL (ref 11.7–15.7)
IMM GRANULOCYTES # BLD: 0.1 10E3/UL
IMM GRANULOCYTES NFR BLD: 0 %
LYMPHOCYTES # BLD AUTO: 2 10E3/UL (ref 0.8–5.3)
LYMPHOCYTES NFR BLD AUTO: 18 %
MCH RBC QN AUTO: 32.6 PG (ref 26.5–33)
MCHC RBC AUTO-ENTMCNC: 36.8 G/DL (ref 31.5–36.5)
MCV RBC AUTO: 88 FL (ref 78–100)
MONOCYTES # BLD AUTO: 1.1 10E3/UL (ref 0–1.3)
MONOCYTES NFR BLD AUTO: 10 %
NEUTROPHILS # BLD AUTO: 7.5 10E3/UL (ref 1.6–8.3)
NEUTROPHILS NFR BLD AUTO: 67 %
NRBC # BLD AUTO: 0.1 10E3/UL
NRBC BLD AUTO-RTO: 1 /100
PLAT MORPH BLD: ABNORMAL
PLATELET # BLD AUTO: 401 10E3/UL (ref 150–450)
POLYCHROMASIA BLD QL SMEAR: SLIGHT
POTASSIUM SERPL-SCNC: 3.8 MMOL/L (ref 3.4–5.3)
PROT SERPL-MCNC: 7.1 G/DL (ref 6.4–8.3)
RBC # BLD AUTO: 2.15 10E6/UL (ref 3.8–5.2)
RBC MORPH BLD: ABNORMAL
RETICS # AUTO: 0.38 10E6/UL (ref 0.03–0.1)
RETICS/RBC NFR AUTO: 17.7 % (ref 0.5–2)
SICKLE CELLS BLD QL SMEAR: ABNORMAL
SODIUM SERPL-SCNC: 137 MMOL/L (ref 135–145)
TARGETS BLD QL SMEAR: SLIGHT
WBC # BLD AUTO: 11.2 10E3/UL (ref 4–11)

## 2025-08-09 PROCEDURE — 250N000011 HC RX IP 250 OP 636: Performed by: EMERGENCY MEDICINE

## 2025-08-09 PROCEDURE — 85045 AUTOMATED RETICULOCYTE COUNT: CPT | Performed by: EMERGENCY MEDICINE

## 2025-08-09 PROCEDURE — 96374 THER/PROPH/DIAG INJ IV PUSH: CPT | Performed by: EMERGENCY MEDICINE

## 2025-08-09 PROCEDURE — 99284 EMERGENCY DEPT VISIT MOD MDM: CPT | Mod: 25 | Performed by: EMERGENCY MEDICINE

## 2025-08-09 PROCEDURE — 85014 HEMATOCRIT: CPT | Performed by: EMERGENCY MEDICINE

## 2025-08-09 PROCEDURE — 82374 ASSAY BLOOD CARBON DIOXIDE: CPT | Performed by: EMERGENCY MEDICINE

## 2025-08-09 PROCEDURE — 96361 HYDRATE IV INFUSION ADD-ON: CPT | Performed by: EMERGENCY MEDICINE

## 2025-08-09 PROCEDURE — 250N000011 HC RX IP 250 OP 636: Performed by: STUDENT IN AN ORGANIZED HEALTH CARE EDUCATION/TRAINING PROGRAM

## 2025-08-09 PROCEDURE — 36591 DRAW BLOOD OFF VENOUS DEVICE: CPT | Performed by: EMERGENCY MEDICINE

## 2025-08-09 PROCEDURE — 258N000003 HC RX IP 258 OP 636: Performed by: EMERGENCY MEDICINE

## 2025-08-09 PROCEDURE — 99285 EMERGENCY DEPT VISIT HI MDM: CPT | Performed by: EMERGENCY MEDICINE

## 2025-08-09 PROCEDURE — 96375 TX/PRO/DX INJ NEW DRUG ADDON: CPT | Performed by: EMERGENCY MEDICINE

## 2025-08-09 PROCEDURE — 96376 TX/PRO/DX INJ SAME DRUG ADON: CPT | Performed by: EMERGENCY MEDICINE

## 2025-08-09 RX ORDER — ONDANSETRON 2 MG/ML
8 INJECTION INTRAMUSCULAR; INTRAVENOUS
Status: COMPLETED | OUTPATIENT
Start: 2025-08-09 | End: 2025-08-09

## 2025-08-09 RX ORDER — KETOROLAC TROMETHAMINE 30 MG/ML
30 INJECTION, SOLUTION INTRAMUSCULAR; INTRAVENOUS ONCE
Status: COMPLETED | OUTPATIENT
Start: 2025-08-09 | End: 2025-08-09

## 2025-08-09 RX ORDER — HEPARIN SODIUM (PORCINE) LOCK FLUSH IV SOLN 100 UNIT/ML 100 UNIT/ML
100 SOLUTION INTRAVENOUS ONCE
Status: COMPLETED | OUTPATIENT
Start: 2025-08-09 | End: 2025-08-09

## 2025-08-09 RX ADMIN — HYDROMORPHONE HYDROCHLORIDE 1 MG: 1 INJECTION, SOLUTION INTRAMUSCULAR; INTRAVENOUS; SUBCUTANEOUS at 06:28

## 2025-08-09 RX ADMIN — ONDANSETRON 8 MG: 2 INJECTION INTRAMUSCULAR; INTRAVENOUS at 05:25

## 2025-08-09 RX ADMIN — SODIUM CHLORIDE, SODIUM LACTATE, POTASSIUM CHLORIDE, AND CALCIUM CHLORIDE 1000 ML: .6; .31; .03; .02 INJECTION, SOLUTION INTRAVENOUS at 05:40

## 2025-08-09 RX ADMIN — HYDROMORPHONE HYDROCHLORIDE 1 MG: 1 INJECTION, SOLUTION INTRAMUSCULAR; INTRAVENOUS; SUBCUTANEOUS at 05:26

## 2025-08-09 RX ADMIN — HYDROMORPHONE HYDROCHLORIDE 1 MG: 1 INJECTION, SOLUTION INTRAMUSCULAR; INTRAVENOUS; SUBCUTANEOUS at 07:49

## 2025-08-09 RX ADMIN — KETOROLAC TROMETHAMINE 30 MG: 30 INJECTION, SOLUTION INTRAMUSCULAR at 05:26

## 2025-08-09 RX ADMIN — HEPARIN 100 UNITS: 100 SYRINGE at 09:31

## 2025-08-09 ASSESSMENT — ACTIVITIES OF DAILY LIVING (ADL)
ADLS_ACUITY_SCORE: 58

## 2025-08-11 ENCOUNTER — THERAPY VISIT (OUTPATIENT)
Dept: PHYSICAL THERAPY | Facility: CLINIC | Age: 25
End: 2025-08-11
Payer: MEDICAID

## 2025-08-11 ENCOUNTER — INFUSION THERAPY VISIT (OUTPATIENT)
Dept: INFUSION THERAPY | Facility: CLINIC | Age: 25
End: 2025-08-11
Attending: PEDIATRICS
Payer: MEDICAID

## 2025-08-11 ENCOUNTER — NURSE TRIAGE (OUTPATIENT)
Dept: ONCOLOGY | Facility: CLINIC | Age: 25
End: 2025-08-11

## 2025-08-11 ENCOUNTER — PATIENT OUTREACH (OUTPATIENT)
Dept: CARE COORDINATION | Facility: CLINIC | Age: 25
End: 2025-08-11

## 2025-08-11 VITALS
HEART RATE: 120 BPM | OXYGEN SATURATION: 99 % | SYSTOLIC BLOOD PRESSURE: 125 MMHG | RESPIRATION RATE: 16 BRPM | DIASTOLIC BLOOD PRESSURE: 79 MMHG

## 2025-08-11 DIAGNOSIS — D57.00 SICKLE CELL DISEASE WITH CRISIS (H): ICD-10-CM

## 2025-08-11 DIAGNOSIS — G81.10 SPASTIC HEMIPLEGIA, UNSPECIFIED ETIOLOGY, UNSPECIFIED LATERALITY (H): ICD-10-CM

## 2025-08-11 DIAGNOSIS — D57.00 SICKLE CELL PAIN CRISIS (H): Primary | ICD-10-CM

## 2025-08-11 DIAGNOSIS — M25.562 ACUTE PAIN OF LEFT KNEE: Primary | ICD-10-CM

## 2025-08-11 PROCEDURE — 96374 THER/PROPH/DIAG INJ IV PUSH: CPT

## 2025-08-11 PROCEDURE — 96361 HYDRATE IV INFUSION ADD-ON: CPT

## 2025-08-11 PROCEDURE — 250N000013 HC RX MED GY IP 250 OP 250 PS 637: Performed by: PEDIATRICS

## 2025-08-11 PROCEDURE — 97112 NEUROMUSCULAR REEDUCATION: CPT | Mod: GP

## 2025-08-11 PROCEDURE — 250N000011 HC RX IP 250 OP 636: Performed by: PEDIATRICS

## 2025-08-11 PROCEDURE — 258N000003 HC RX IP 258 OP 636: Performed by: PEDIATRICS

## 2025-08-11 PROCEDURE — 96376 TX/PRO/DX INJ SAME DRUG ADON: CPT

## 2025-08-11 PROCEDURE — 97530 THERAPEUTIC ACTIVITIES: CPT | Mod: GP

## 2025-08-11 PROCEDURE — 96375 TX/PRO/DX INJ NEW DRUG ADDON: CPT

## 2025-08-11 RX ORDER — DIPHENHYDRAMINE HCL 25 MG
50 CAPSULE ORAL
Start: 2025-11-01

## 2025-08-11 RX ORDER — ONDANSETRON 2 MG/ML
8 INJECTION INTRAMUSCULAR; INTRAVENOUS EVERY 6 HOURS PRN
Start: 2025-11-01

## 2025-08-11 RX ORDER — DIPHENHYDRAMINE HCL 25 MG
50 CAPSULE ORAL
Status: COMPLETED | OUTPATIENT
Start: 2025-08-11 | End: 2025-08-11

## 2025-08-11 RX ORDER — HEPARIN SODIUM,PORCINE 10 UNIT/ML
5 VIAL (ML) INTRAVENOUS
Status: DISCONTINUED | OUTPATIENT
Start: 2025-08-11 | End: 2025-08-11 | Stop reason: HOSPADM

## 2025-08-11 RX ORDER — HEPARIN SODIUM,PORCINE 10 UNIT/ML
5 VIAL (ML) INTRAVENOUS
OUTPATIENT
Start: 2025-11-01

## 2025-08-11 RX ORDER — KETOROLAC TROMETHAMINE 30 MG/ML
30 INJECTION, SOLUTION INTRAMUSCULAR; INTRAVENOUS ONCE
Status: COMPLETED | OUTPATIENT
Start: 2025-08-11 | End: 2025-08-11

## 2025-08-11 RX ORDER — ONDANSETRON 2 MG/ML
8 INJECTION INTRAMUSCULAR; INTRAVENOUS EVERY 6 HOURS PRN
Status: DISCONTINUED | OUTPATIENT
Start: 2025-08-11 | End: 2025-08-11 | Stop reason: HOSPADM

## 2025-08-11 RX ORDER — ONDANSETRON 8 MG/1
8 TABLET, FILM COATED ORAL
Start: 2025-11-01

## 2025-08-11 RX ORDER — HEPARIN SODIUM (PORCINE) LOCK FLUSH IV SOLN 100 UNIT/ML 100 UNIT/ML
5 SOLUTION INTRAVENOUS
Status: DISCONTINUED | OUTPATIENT
Start: 2025-08-11 | End: 2025-08-11 | Stop reason: HOSPADM

## 2025-08-11 RX ORDER — KETOROLAC TROMETHAMINE 30 MG/ML
30 INJECTION, SOLUTION INTRAMUSCULAR; INTRAVENOUS ONCE
Start: 2025-11-01 | End: 2025-11-01

## 2025-08-11 RX ORDER — OXYCODONE HYDROCHLORIDE 10 MG/1
10 TABLET ORAL EVERY 6 HOURS PRN
Qty: 18 TABLET | Refills: 0 | Status: SHIPPED | OUTPATIENT
Start: 2025-08-11

## 2025-08-11 RX ORDER — HEPARIN SODIUM (PORCINE) LOCK FLUSH IV SOLN 100 UNIT/ML 100 UNIT/ML
5 SOLUTION INTRAVENOUS
OUTPATIENT
Start: 2025-11-01

## 2025-08-11 RX ADMIN — DIPHENHYDRAMINE HYDROCHLORIDE 50 MG: 25 CAPSULE ORAL at 10:02

## 2025-08-11 RX ADMIN — HYDROMORPHONE HYDROCHLORIDE 1 MG: 1 INJECTION, SOLUTION INTRAMUSCULAR; INTRAVENOUS; SUBCUTANEOUS at 12:16

## 2025-08-11 RX ADMIN — ONDANSETRON 8 MG: 2 INJECTION INTRAMUSCULAR; INTRAVENOUS at 10:05

## 2025-08-11 RX ADMIN — HYDROMORPHONE HYDROCHLORIDE 1 MG: 1 INJECTION, SOLUTION INTRAMUSCULAR; INTRAVENOUS; SUBCUTANEOUS at 10:00

## 2025-08-11 RX ADMIN — HYDROMORPHONE HYDROCHLORIDE 1 MG: 1 INJECTION, SOLUTION INTRAMUSCULAR; INTRAVENOUS; SUBCUTANEOUS at 10:59

## 2025-08-11 RX ADMIN — KETOROLAC TROMETHAMINE 30 MG: 30 INJECTION, SOLUTION INTRAMUSCULAR at 10:07

## 2025-08-11 RX ADMIN — HEPARIN 5 ML: 100 SYRINGE at 12:53

## 2025-08-11 RX ADMIN — SODIUM CHLORIDE, SODIUM LACTATE, POTASSIUM CHLORIDE, AND CALCIUM CHLORIDE 1000 ML: .6; .31; .03; .02 INJECTION, SOLUTION INTRAVENOUS at 09:58

## 2025-08-12 ENCOUNTER — APPOINTMENT (OUTPATIENT)
Dept: GENERAL RADIOLOGY | Facility: CLINIC | Age: 25
End: 2025-08-12
Attending: EMERGENCY MEDICINE
Payer: MEDICAID

## 2025-08-12 ENCOUNTER — HOSPITAL ENCOUNTER (EMERGENCY)
Facility: CLINIC | Age: 25
Discharge: HOME | End: 2025-08-12
Attending: EMERGENCY MEDICINE
Payer: MEDICAID

## 2025-08-12 VITALS
SYSTOLIC BLOOD PRESSURE: 130 MMHG | RESPIRATION RATE: 18 BRPM | OXYGEN SATURATION: 98 % | TEMPERATURE: 98 F | HEART RATE: 75 BPM | DIASTOLIC BLOOD PRESSURE: 74 MMHG

## 2025-08-12 DIAGNOSIS — D57.00 SICKLE CELL PAIN CRISIS (H): Primary | ICD-10-CM

## 2025-08-12 LAB
ACANTHOCYTES BLD QL SMEAR: SLIGHT
ALBUMIN SERPL BCG-MCNC: 4.6 G/DL (ref 3.5–5.2)
ALP SERPL-CCNC: 63 U/L (ref 40–150)
ALT SERPL W P-5'-P-CCNC: 34 U/L (ref 0–50)
ANION GAP SERPL CALCULATED.3IONS-SCNC: 12 MMOL/L (ref 7–15)
AST SERPL W P-5'-P-CCNC: 60 U/L (ref 0–45)
ATRIAL RATE - MUSE: 86 BPM
BASOPHILS # BLD MANUAL: 0 10E3/UL (ref 0–0.2)
BASOPHILS NFR BLD MANUAL: 0 %
BILIRUB SERPL-MCNC: 3.5 MG/DL
BUN SERPL-MCNC: 8.6 MG/DL (ref 6–20)
CALCIUM SERPL-MCNC: 8.6 MG/DL (ref 8.8–10.4)
CHLORIDE SERPL-SCNC: 106 MMOL/L (ref 98–107)
CREAT SERPL-MCNC: 0.53 MG/DL (ref 0.51–0.95)
DIASTOLIC BLOOD PRESSURE - MUSE: NORMAL MMHG
EGFRCR SERPLBLD CKD-EPI 2021: >90 ML/MIN/1.73M2
ELLIPTOCYTES BLD QL SMEAR: ABNORMAL
EOSINOPHIL # BLD MANUAL: 0.25 10E3/UL (ref 0–0.7)
EOSINOPHIL NFR BLD MANUAL: 2 %
ERYTHROCYTE [DISTWIDTH] IN BLOOD BY AUTOMATED COUNT: 25.1 % (ref 10–15)
FLUAV RNA SPEC QL NAA+PROBE: NEGATIVE
FLUBV RNA RESP QL NAA+PROBE: NEGATIVE
GLUCOSE SERPL-MCNC: 90 MG/DL (ref 70–99)
HCO3 SERPL-SCNC: 20 MMOL/L (ref 22–29)
HCT VFR BLD AUTO: 19.7 % (ref 35–47)
HGB BLD-MCNC: 7.3 G/DL (ref 11.7–15.7)
INTERPRETATION ECG - MUSE: NORMAL
LYMPHOCYTES # BLD MANUAL: 3.44 10E3/UL (ref 0.8–5.3)
LYMPHOCYTES NFR BLD MANUAL: 28 %
MCH RBC QN AUTO: 32.7 PG (ref 26.5–33)
MCHC RBC AUTO-ENTMCNC: 37.1 G/DL (ref 31.5–36.5)
MCV RBC AUTO: 88.3 FL (ref 78–100)
MONOCYTES # BLD MANUAL: 0.25 10E3/UL (ref 0–1.3)
MONOCYTES NFR BLD MANUAL: 2 %
NEUTROPHILS # BLD MANUAL: 8.36 10E3/UL (ref 1.6–8.3)
NEUTROPHILS NFR BLD MANUAL: 68 %
NRBC # BLD AUTO: 0.2 10E3/UL
NRBC BLD MANUAL-RTO: 2 %
P AXIS - MUSE: 74 DEGREES
PLAT MORPH BLD: ABNORMAL
PLATELET # BLD AUTO: 423 10E3/UL (ref 150–450)
POLYCHROMASIA BLD QL SMEAR: SLIGHT
POTASSIUM SERPL-SCNC: 4 MMOL/L (ref 3.4–5.3)
PR INTERVAL - MUSE: 152 MS
PROT SERPL-MCNC: 7.7 G/DL (ref 6.4–8.3)
QRS DURATION - MUSE: 78 MS
QT - MUSE: 384 MS
QTC - MUSE: 459 MS
R AXIS - MUSE: 35 DEGREES
RBC # BLD AUTO: 2.23 10E6/UL (ref 3.8–5.2)
RBC MORPH BLD: ABNORMAL
RETICS # AUTO: NORMAL 10*3/UL
RETICS/RBC NFR AUTO: NORMAL %
RSV RNA SPEC NAA+PROBE: NEGATIVE
SARS-COV-2 RNA RESP QL NAA+PROBE: NEGATIVE
SICKLE CELLS BLD QL SMEAR: SLIGHT
SODIUM SERPL-SCNC: 138 MMOL/L (ref 135–145)
SYSTOLIC BLOOD PRESSURE - MUSE: NORMAL MMHG
T AXIS - MUSE: 23 DEGREES
TARGETS BLD QL SMEAR: SLIGHT
VENTRICULAR RATE- MUSE: 86 BPM
WBC # BLD AUTO: 12.26 10E3/UL (ref 4–11)

## 2025-08-12 PROCEDURE — 71046 X-RAY EXAM CHEST 2 VIEWS: CPT

## 2025-08-12 PROCEDURE — 250N000011 HC RX IP 250 OP 636: Performed by: EMERGENCY MEDICINE

## 2025-08-12 PROCEDURE — 96375 TX/PRO/DX INJ NEW DRUG ADDON: CPT | Performed by: EMERGENCY MEDICINE

## 2025-08-12 PROCEDURE — 93005 ELECTROCARDIOGRAM TRACING: CPT | Performed by: EMERGENCY MEDICINE

## 2025-08-12 PROCEDURE — 96361 HYDRATE IV INFUSION ADD-ON: CPT | Performed by: EMERGENCY MEDICINE

## 2025-08-12 PROCEDURE — 87637 SARSCOV2&INF A&B&RSV AMP PRB: CPT | Performed by: EMERGENCY MEDICINE

## 2025-08-12 PROCEDURE — 85014 HEMATOCRIT: CPT | Performed by: EMERGENCY MEDICINE

## 2025-08-12 PROCEDURE — 82310 ASSAY OF CALCIUM: CPT | Performed by: EMERGENCY MEDICINE

## 2025-08-12 PROCEDURE — 99285 EMERGENCY DEPT VISIT HI MDM: CPT | Performed by: EMERGENCY MEDICINE

## 2025-08-12 PROCEDURE — 258N000003 HC RX IP 258 OP 636: Performed by: EMERGENCY MEDICINE

## 2025-08-12 PROCEDURE — 36415 COLL VENOUS BLD VENIPUNCTURE: CPT | Performed by: EMERGENCY MEDICINE

## 2025-08-12 PROCEDURE — 85007 BL SMEAR W/DIFF WBC COUNT: CPT | Performed by: EMERGENCY MEDICINE

## 2025-08-12 PROCEDURE — 96374 THER/PROPH/DIAG INJ IV PUSH: CPT | Performed by: EMERGENCY MEDICINE

## 2025-08-12 PROCEDURE — 99285 EMERGENCY DEPT VISIT HI MDM: CPT | Mod: 25 | Performed by: EMERGENCY MEDICINE

## 2025-08-12 PROCEDURE — 85045 AUTOMATED RETICULOCYTE COUNT: CPT | Performed by: EMERGENCY MEDICINE

## 2025-08-12 PROCEDURE — 96376 TX/PRO/DX INJ SAME DRUG ADON: CPT | Performed by: EMERGENCY MEDICINE

## 2025-08-12 RX ORDER — ONDANSETRON 2 MG/ML
4 INJECTION INTRAMUSCULAR; INTRAVENOUS ONCE
Status: COMPLETED | OUTPATIENT
Start: 2025-08-12 | End: 2025-08-12

## 2025-08-12 RX ORDER — HEPARIN SODIUM (PORCINE) LOCK FLUSH IV SOLN 100 UNIT/ML 100 UNIT/ML
100 SOLUTION INTRAVENOUS ONCE
Status: COMPLETED | OUTPATIENT
Start: 2025-08-12 | End: 2025-08-12

## 2025-08-12 RX ORDER — SODIUM CHLORIDE, SODIUM LACTATE, POTASSIUM CHLORIDE, CALCIUM CHLORIDE 600; 310; 30; 20 MG/100ML; MG/100ML; MG/100ML; MG/100ML
INJECTION, SOLUTION INTRAVENOUS CONTINUOUS
Status: DISCONTINUED | OUTPATIENT
Start: 2025-08-12 | End: 2025-08-13 | Stop reason: HOSPADM

## 2025-08-12 RX ORDER — KETOROLAC TROMETHAMINE 30 MG/ML
30 INJECTION, SOLUTION INTRAMUSCULAR; INTRAVENOUS ONCE
Status: COMPLETED | OUTPATIENT
Start: 2025-08-12 | End: 2025-08-12

## 2025-08-12 RX ADMIN — HEPARIN 100 UNITS: 100 SYRINGE at 22:55

## 2025-08-12 RX ADMIN — HYDROMORPHONE HYDROCHLORIDE 1 MG: 1 INJECTION, SOLUTION INTRAMUSCULAR; INTRAVENOUS; SUBCUTANEOUS at 20:44

## 2025-08-12 RX ADMIN — ONDANSETRON 4 MG: 2 INJECTION INTRAMUSCULAR; INTRAVENOUS at 19:40

## 2025-08-12 RX ADMIN — SODIUM CHLORIDE, SODIUM LACTATE, POTASSIUM CHLORIDE, AND CALCIUM CHLORIDE: .6; .31; .03; .02 INJECTION, SOLUTION INTRAVENOUS at 19:39

## 2025-08-12 RX ADMIN — KETOROLAC TROMETHAMINE 30 MG: 30 INJECTION, SOLUTION INTRAMUSCULAR at 19:40

## 2025-08-12 RX ADMIN — HYDROMORPHONE HYDROCHLORIDE 1 MG: 1 INJECTION, SOLUTION INTRAMUSCULAR; INTRAVENOUS; SUBCUTANEOUS at 19:40

## 2025-08-12 RX ADMIN — HYDROMORPHONE HYDROCHLORIDE 1 MG: 1 INJECTION, SOLUTION INTRAMUSCULAR; INTRAVENOUS; SUBCUTANEOUS at 21:48

## 2025-08-12 ASSESSMENT — ACTIVITIES OF DAILY LIVING (ADL)
ADLS_ACUITY_SCORE: 58

## 2025-08-14 ENCOUNTER — VIRTUAL VISIT (OUTPATIENT)
Dept: PSYCHOLOGY | Facility: CLINIC | Age: 25
End: 2025-08-14
Payer: MEDICAID

## 2025-08-14 ENCOUNTER — VIRTUAL VISIT (OUTPATIENT)
Dept: PHARMACY | Facility: CLINIC | Age: 25
End: 2025-08-14
Payer: MEDICAID

## 2025-08-14 DIAGNOSIS — D57.00 SICKLE CELL PAIN CRISIS (H): ICD-10-CM

## 2025-08-14 DIAGNOSIS — G89.4 CHRONIC PAIN ASSOCIATED WITH SIGNIFICANT PSYCHOSOCIAL DYSFUNCTION: Primary | ICD-10-CM

## 2025-08-14 DIAGNOSIS — J45.40 MODERATE PERSISTENT ASTHMA, UNSPECIFIED WHETHER COMPLICATED: Primary | ICD-10-CM

## 2025-08-14 DIAGNOSIS — F54 PSYCHOLOGICAL AND BEHAVIORAL FACTORS ASSOCIATED WITH DISORDERS OR DISEASES CLASSIFIED ELSEWHERE: ICD-10-CM

## 2025-08-14 PROCEDURE — 99606 MTMS BY PHARM EST 15 MIN: CPT | Mod: 93

## 2025-08-14 PROCEDURE — 90837 PSYTX W PT 60 MINUTES: CPT | Mod: 95 | Performed by: PSYCHOLOGIST

## 2025-08-15 ENCOUNTER — APPOINTMENT (OUTPATIENT)
Dept: LAB | Facility: CLINIC | Age: 25
End: 2025-08-15
Attending: PEDIATRICS
Payer: MEDICAID

## 2025-08-16 ENCOUNTER — HOSPITAL ENCOUNTER (EMERGENCY)
Facility: CLINIC | Age: 25
Discharge: HOME | End: 2025-08-17
Admitting: PHYSICIAN ASSISTANT
Payer: MEDICAID

## 2025-08-16 DIAGNOSIS — M54.9 BACK PAIN: Primary | ICD-10-CM

## 2025-08-16 PROCEDURE — 250N000013 HC RX MED GY IP 250 OP 250 PS 637: Performed by: PHYSICIAN ASSISTANT

## 2025-08-16 PROCEDURE — 99284 EMERGENCY DEPT VISIT MOD MDM: CPT

## 2025-08-16 PROCEDURE — 99284 EMERGENCY DEPT VISIT MOD MDM: CPT | Performed by: PHYSICIAN ASSISTANT

## 2025-08-16 PROCEDURE — 96372 THER/PROPH/DIAG INJ SC/IM: CPT | Performed by: PHYSICIAN ASSISTANT

## 2025-08-16 PROCEDURE — 250N000011 HC RX IP 250 OP 636: Performed by: PHYSICIAN ASSISTANT

## 2025-08-16 RX ORDER — LIDOCAINE 4 G/G
1 PATCH TOPICAL ONCE
Status: DISCONTINUED | OUTPATIENT
Start: 2025-08-16 | End: 2025-08-17 | Stop reason: HOSPADM

## 2025-08-16 RX ORDER — KETOROLAC TROMETHAMINE 15 MG/ML
15 INJECTION, SOLUTION INTRAMUSCULAR; INTRAVENOUS ONCE
Status: COMPLETED | OUTPATIENT
Start: 2025-08-16 | End: 2025-08-16

## 2025-08-16 RX ADMIN — KETOROLAC TROMETHAMINE 15 MG: 15 INJECTION, SOLUTION INTRAMUSCULAR; INTRAVENOUS at 23:19

## 2025-08-16 RX ADMIN — LIDOCAINE 1 PATCH: 4 PATCH TOPICAL at 23:19

## 2025-08-16 ASSESSMENT — ENCOUNTER SYMPTOMS
SICKLE CELL PAIN: 1
BACK PAIN: 1

## 2025-08-16 ASSESSMENT — ACTIVITIES OF DAILY LIVING (ADL): ADLS_ACUITY_SCORE: 58

## 2025-08-17 VITALS
TEMPERATURE: 98.1 F | HEART RATE: 90 BPM | OXYGEN SATURATION: 90 % | DIASTOLIC BLOOD PRESSURE: 78 MMHG | RESPIRATION RATE: 16 BRPM | SYSTOLIC BLOOD PRESSURE: 116 MMHG

## 2025-08-18 ENCOUNTER — INFUSION THERAPY VISIT (OUTPATIENT)
Dept: INFUSION THERAPY | Facility: CLINIC | Age: 25
End: 2025-08-18
Attending: PEDIATRICS
Payer: MEDICAID

## 2025-08-18 ENCOUNTER — PATIENT OUTREACH (OUTPATIENT)
Dept: CARE COORDINATION | Facility: CLINIC | Age: 25
End: 2025-08-18
Payer: MEDICAID

## 2025-08-18 ENCOUNTER — NURSE TRIAGE (OUTPATIENT)
Dept: ONCOLOGY | Facility: CLINIC | Age: 25
End: 2025-08-18
Payer: MEDICAID

## 2025-08-18 VITALS
OXYGEN SATURATION: 92 % | HEART RATE: 84 BPM | DIASTOLIC BLOOD PRESSURE: 63 MMHG | TEMPERATURE: 98.3 F | SYSTOLIC BLOOD PRESSURE: 128 MMHG

## 2025-08-18 DIAGNOSIS — G81.10 SPASTIC HEMIPLEGIA, UNSPECIFIED ETIOLOGY, UNSPECIFIED LATERALITY (H): ICD-10-CM

## 2025-08-18 DIAGNOSIS — D57.00 SICKLE CELL PAIN CRISIS (H): Primary | ICD-10-CM

## 2025-08-18 DIAGNOSIS — D57.00 SICKLE CELL DISEASE WITH CRISIS (H): ICD-10-CM

## 2025-08-18 PROCEDURE — 96361 HYDRATE IV INFUSION ADD-ON: CPT

## 2025-08-18 PROCEDURE — 96376 TX/PRO/DX INJ SAME DRUG ADON: CPT

## 2025-08-18 PROCEDURE — 250N000013 HC RX MED GY IP 250 OP 250 PS 637: Performed by: PEDIATRICS

## 2025-08-18 PROCEDURE — 250N000011 HC RX IP 250 OP 636: Performed by: PEDIATRICS

## 2025-08-18 PROCEDURE — 96375 TX/PRO/DX INJ NEW DRUG ADDON: CPT

## 2025-08-18 PROCEDURE — 258N000003 HC RX IP 258 OP 636: Performed by: PEDIATRICS

## 2025-08-18 PROCEDURE — 96374 THER/PROPH/DIAG INJ IV PUSH: CPT

## 2025-08-18 RX ORDER — KETOROLAC TROMETHAMINE 30 MG/ML
30 INJECTION, SOLUTION INTRAMUSCULAR; INTRAVENOUS ONCE
Status: DISCONTINUED | OUTPATIENT
Start: 2025-08-18 | End: 2025-08-18 | Stop reason: HOSPADM

## 2025-08-18 RX ORDER — HEPARIN SODIUM (PORCINE) LOCK FLUSH IV SOLN 100 UNIT/ML 100 UNIT/ML
5 SOLUTION INTRAVENOUS
Status: CANCELLED | OUTPATIENT
Start: 2025-11-01

## 2025-08-18 RX ORDER — KETOROLAC TROMETHAMINE 30 MG/ML
30 INJECTION, SOLUTION INTRAMUSCULAR; INTRAVENOUS ONCE
Status: CANCELLED
Start: 2025-11-01 | End: 2025-11-01

## 2025-08-18 RX ORDER — OXYCODONE HYDROCHLORIDE 10 MG/1
10 TABLET ORAL EVERY 6 HOURS PRN
Qty: 18 TABLET | Refills: 0 | Status: SHIPPED | OUTPATIENT
Start: 2025-08-18

## 2025-08-18 RX ORDER — HEPARIN SODIUM,PORCINE 10 UNIT/ML
5 VIAL (ML) INTRAVENOUS
Status: CANCELLED | OUTPATIENT
Start: 2025-11-01

## 2025-08-18 RX ORDER — HEPARIN SODIUM (PORCINE) LOCK FLUSH IV SOLN 100 UNIT/ML 100 UNIT/ML
5 SOLUTION INTRAVENOUS
Status: DISCONTINUED | OUTPATIENT
Start: 2025-08-18 | End: 2025-08-18 | Stop reason: HOSPADM

## 2025-08-18 RX ORDER — DIPHENHYDRAMINE HCL 25 MG
50 CAPSULE ORAL
Status: CANCELLED
Start: 2025-11-01

## 2025-08-18 RX ORDER — ONDANSETRON 2 MG/ML
8 INJECTION INTRAMUSCULAR; INTRAVENOUS EVERY 6 HOURS PRN
Status: DISCONTINUED | OUTPATIENT
Start: 2025-08-18 | End: 2025-08-18 | Stop reason: HOSPADM

## 2025-08-18 RX ORDER — ONDANSETRON 2 MG/ML
8 INJECTION INTRAMUSCULAR; INTRAVENOUS EVERY 6 HOURS PRN
Status: CANCELLED
Start: 2025-11-01

## 2025-08-18 RX ORDER — DIPHENHYDRAMINE HCL 25 MG
50 CAPSULE ORAL
Status: COMPLETED | OUTPATIENT
Start: 2025-08-18 | End: 2025-08-18

## 2025-08-18 RX ORDER — ONDANSETRON 8 MG/1
8 TABLET, FILM COATED ORAL
Status: CANCELLED
Start: 2025-11-01

## 2025-08-18 RX ADMIN — HYDROMORPHONE HYDROCHLORIDE 1 MG: 1 INJECTION, SOLUTION INTRAMUSCULAR; INTRAVENOUS; SUBCUTANEOUS at 10:16

## 2025-08-18 RX ADMIN — HEPARIN 5 ML: 100 SYRINGE at 11:30

## 2025-08-18 RX ADMIN — HYDROMORPHONE HYDROCHLORIDE 1 MG: 1 INJECTION, SOLUTION INTRAMUSCULAR; INTRAVENOUS; SUBCUTANEOUS at 11:16

## 2025-08-18 RX ADMIN — ONDANSETRON 8 MG: 2 INJECTION INTRAMUSCULAR; INTRAVENOUS at 09:16

## 2025-08-18 RX ADMIN — HYDROMORPHONE HYDROCHLORIDE 1 MG: 1 INJECTION, SOLUTION INTRAMUSCULAR; INTRAVENOUS; SUBCUTANEOUS at 09:16

## 2025-08-18 RX ADMIN — DIPHENHYDRAMINE HYDROCHLORIDE 50 MG: 25 CAPSULE ORAL at 09:17

## 2025-08-18 RX ADMIN — SODIUM CHLORIDE, SODIUM LACTATE, POTASSIUM CHLORIDE, AND CALCIUM CHLORIDE 1000 ML: .6; .31; .03; .02 INJECTION, SOLUTION INTRAVENOUS at 09:19

## 2025-08-18 ASSESSMENT — PAIN SCALES - GENERAL: PAINLEVEL_OUTOF10: SEVERE PAIN (8)

## 2025-08-19 ENCOUNTER — ANCILLARY PROCEDURE (OUTPATIENT)
Dept: MRI IMAGING | Facility: CLINIC | Age: 25
End: 2025-08-19
Attending: FAMILY MEDICINE
Payer: MEDICAID

## 2025-08-19 PROCEDURE — 73221 MRI JOINT UPR EXTREM W/O DYE: CPT | Mod: LT | Performed by: RADIOLOGY

## 2025-08-20 ENCOUNTER — NURSE TRIAGE (OUTPATIENT)
Dept: ONCOLOGY | Facility: CLINIC | Age: 25
End: 2025-08-20
Payer: MEDICAID

## 2025-08-20 ENCOUNTER — PATIENT OUTREACH (OUTPATIENT)
Dept: CARE COORDINATION | Facility: CLINIC | Age: 25
End: 2025-08-20
Payer: MEDICAID

## 2025-08-20 ENCOUNTER — INFUSION THERAPY VISIT (OUTPATIENT)
Dept: ONCOLOGY | Facility: CLINIC | Age: 25
End: 2025-08-20
Attending: PEDIATRICS
Payer: MEDICAID

## 2025-08-20 VITALS
OXYGEN SATURATION: 96 % | DIASTOLIC BLOOD PRESSURE: 61 MMHG | HEART RATE: 108 BPM | WEIGHT: 158.5 LBS | SYSTOLIC BLOOD PRESSURE: 121 MMHG | RESPIRATION RATE: 15 BRPM | BODY MASS INDEX: 27.21 KG/M2 | TEMPERATURE: 97.8 F

## 2025-08-20 DIAGNOSIS — D57.00 SICKLE CELL PAIN CRISIS (H): Primary | ICD-10-CM

## 2025-08-20 DIAGNOSIS — G81.10 SPASTIC HEMIPLEGIA, UNSPECIFIED ETIOLOGY, UNSPECIFIED LATERALITY (H): ICD-10-CM

## 2025-08-20 PROCEDURE — 258N000003 HC RX IP 258 OP 636: Performed by: PEDIATRICS

## 2025-08-20 PROCEDURE — 96376 TX/PRO/DX INJ SAME DRUG ADON: CPT

## 2025-08-20 PROCEDURE — 250N000011 HC RX IP 250 OP 636: Performed by: PEDIATRICS

## 2025-08-20 PROCEDURE — 96374 THER/PROPH/DIAG INJ IV PUSH: CPT

## 2025-08-20 PROCEDURE — 96361 HYDRATE IV INFUSION ADD-ON: CPT

## 2025-08-20 PROCEDURE — 96375 TX/PRO/DX INJ NEW DRUG ADDON: CPT

## 2025-08-20 PROCEDURE — 250N000013 HC RX MED GY IP 250 OP 250 PS 637: Performed by: PEDIATRICS

## 2025-08-20 RX ORDER — ONDANSETRON 2 MG/ML
8 INJECTION INTRAMUSCULAR; INTRAVENOUS EVERY 6 HOURS PRN
Start: 2025-11-01

## 2025-08-20 RX ORDER — HEPARIN SODIUM (PORCINE) LOCK FLUSH IV SOLN 100 UNIT/ML 100 UNIT/ML
5 SOLUTION INTRAVENOUS
Status: DISCONTINUED | OUTPATIENT
Start: 2025-08-20 | End: 2025-08-20 | Stop reason: HOSPADM

## 2025-08-20 RX ORDER — ONDANSETRON 2 MG/ML
8 INJECTION INTRAMUSCULAR; INTRAVENOUS EVERY 6 HOURS PRN
Status: DISCONTINUED | OUTPATIENT
Start: 2025-08-20 | End: 2025-08-20 | Stop reason: HOSPADM

## 2025-08-20 RX ORDER — HEPARIN SODIUM (PORCINE) LOCK FLUSH IV SOLN 100 UNIT/ML 100 UNIT/ML
5 SOLUTION INTRAVENOUS
OUTPATIENT
Start: 2025-11-01

## 2025-08-20 RX ORDER — ONDANSETRON 4 MG/1
8 TABLET, FILM COATED ORAL
Start: 2025-11-01

## 2025-08-20 RX ORDER — KETOROLAC TROMETHAMINE 30 MG/ML
30 INJECTION, SOLUTION INTRAMUSCULAR; INTRAVENOUS ONCE
Start: 2025-11-01 | End: 2025-11-01

## 2025-08-20 RX ORDER — DIPHENHYDRAMINE HCL 25 MG
50 CAPSULE ORAL
Status: COMPLETED | OUTPATIENT
Start: 2025-08-20 | End: 2025-08-20

## 2025-08-20 RX ORDER — HEPARIN SODIUM,PORCINE 10 UNIT/ML
5 VIAL (ML) INTRAVENOUS
OUTPATIENT
Start: 2025-11-01

## 2025-08-20 RX ORDER — DIPHENHYDRAMINE HCL 25 MG
50 CAPSULE ORAL
Start: 2025-11-01

## 2025-08-20 RX ORDER — KETOROLAC TROMETHAMINE 30 MG/ML
30 INJECTION, SOLUTION INTRAMUSCULAR; INTRAVENOUS ONCE
Status: COMPLETED | OUTPATIENT
Start: 2025-08-20 | End: 2025-08-20

## 2025-08-20 RX ADMIN — HYDROMORPHONE HYDROCHLORIDE 1 MG: 1 INJECTION, SOLUTION INTRAMUSCULAR; INTRAVENOUS; SUBCUTANEOUS at 10:26

## 2025-08-20 RX ADMIN — ONDANSETRON 8 MG: 2 INJECTION INTRAMUSCULAR; INTRAVENOUS at 08:28

## 2025-08-20 RX ADMIN — KETOROLAC TROMETHAMINE 30 MG: 30 INJECTION, SOLUTION INTRAMUSCULAR; INTRAVENOUS at 08:29

## 2025-08-20 RX ADMIN — Medication 5 ML: at 10:51

## 2025-08-20 RX ADMIN — HYDROMORPHONE HYDROCHLORIDE 1 MG: 1 INJECTION, SOLUTION INTRAMUSCULAR; INTRAVENOUS; SUBCUTANEOUS at 08:26

## 2025-08-20 RX ADMIN — SODIUM CHLORIDE, SODIUM LACTATE, POTASSIUM CHLORIDE, AND CALCIUM CHLORIDE 1000 ML: .6; .31; .03; .02 INJECTION, SOLUTION INTRAVENOUS at 08:26

## 2025-08-20 RX ADMIN — DIPHENHYDRAMINE HYDROCHLORIDE 50 MG: 25 CAPSULE ORAL at 08:17

## 2025-08-20 RX ADMIN — HYDROMORPHONE HYDROCHLORIDE 1 MG: 1 INJECTION, SOLUTION INTRAMUSCULAR; INTRAVENOUS; SUBCUTANEOUS at 09:29

## 2025-08-21 ENCOUNTER — OFFICE VISIT (OUTPATIENT)
Dept: ORTHOPEDICS | Facility: CLINIC | Age: 25
End: 2025-08-21
Attending: FAMILY MEDICINE
Payer: MEDICAID

## 2025-08-21 ENCOUNTER — APPOINTMENT (OUTPATIENT)
Dept: GENERAL RADIOLOGY | Facility: CLINIC | Age: 25
End: 2025-08-21
Attending: FAMILY MEDICINE
Payer: MEDICAID

## 2025-08-21 ENCOUNTER — VIRTUAL VISIT (OUTPATIENT)
Dept: PSYCHOLOGY | Facility: CLINIC | Age: 25
End: 2025-08-21
Payer: MEDICAID

## 2025-08-21 ENCOUNTER — HOSPITAL ENCOUNTER (EMERGENCY)
Facility: CLINIC | Age: 25
Discharge: HOME | End: 2025-08-21
Attending: FAMILY MEDICINE | Admitting: FAMILY MEDICINE
Payer: MEDICAID

## 2025-08-21 VITALS
RESPIRATION RATE: 16 BRPM | SYSTOLIC BLOOD PRESSURE: 116 MMHG | HEART RATE: 81 BPM | DIASTOLIC BLOOD PRESSURE: 59 MMHG | OXYGEN SATURATION: 94 % | TEMPERATURE: 98 F

## 2025-08-21 DIAGNOSIS — F54 PSYCHOLOGICAL AND BEHAVIORAL FACTORS ASSOCIATED WITH DISORDERS OR DISEASES CLASSIFIED ELSEWHERE: ICD-10-CM

## 2025-08-21 DIAGNOSIS — F34.1 PERSISTENT DEPRESSIVE DISORDER: ICD-10-CM

## 2025-08-21 DIAGNOSIS — D57.00 SICKLE CELL PAIN CRISIS (H): ICD-10-CM

## 2025-08-21 DIAGNOSIS — G89.4 CHRONIC PAIN ASSOCIATED WITH SIGNIFICANT PSYCHOSOCIAL DYSFUNCTION: Primary | ICD-10-CM

## 2025-08-21 DIAGNOSIS — M77.8 LEFT WRIST TENDINITIS: Primary | ICD-10-CM

## 2025-08-21 DIAGNOSIS — I69.351 HEMIPLEGIA AND HEMIPARESIS FOLLOWING CEREBRAL INFARCTION AFFECTING RIGHT DOMINANT SIDE (H): ICD-10-CM

## 2025-08-21 DIAGNOSIS — M62.838 MUSCLE SPASM OF RIGHT SHOULDER: Primary | ICD-10-CM

## 2025-08-21 LAB
ALBUMIN SERPL BCG-MCNC: 4.6 G/DL (ref 3.5–5.2)
ALP SERPL-CCNC: 60 U/L (ref 40–150)
ALT SERPL W P-5'-P-CCNC: 30 U/L (ref 0–50)
ANION GAP SERPL CALCULATED.3IONS-SCNC: 11 MMOL/L (ref 7–15)
AST SERPL W P-5'-P-CCNC: 58 U/L (ref 0–45)
BASOPHILS # BLD AUTO: 0.2 10E3/UL (ref 0–0.2)
BASOPHILS NFR BLD AUTO: 1.5 %
BILIRUB SERPL-MCNC: 4.2 MG/DL
BUN SERPL-MCNC: 8.1 MG/DL (ref 6–20)
CALCIUM SERPL-MCNC: 8.5 MG/DL (ref 8.8–10.4)
CHLORIDE SERPL-SCNC: 106 MMOL/L (ref 98–107)
CREAT SERPL-MCNC: 0.56 MG/DL (ref 0.51–0.95)
EGFRCR SERPLBLD CKD-EPI 2021: >90 ML/MIN/1.73M2
EOSINOPHIL # BLD AUTO: 0.42 10E3/UL (ref 0–0.7)
EOSINOPHIL NFR BLD AUTO: 3.2 %
ERYTHROCYTE [DISTWIDTH] IN BLOOD BY AUTOMATED COUNT: 23.2 % (ref 10–15)
GLUCOSE SERPL-MCNC: 89 MG/DL (ref 70–99)
HCO3 SERPL-SCNC: 22 MMOL/L (ref 22–29)
HCT VFR BLD AUTO: 17.6 % (ref 35–47)
HGB BLD-MCNC: 6.7 G/DL (ref 11.7–15.7)
IMM GRANULOCYTES # BLD: 0.06 10E3/UL
IMM GRANULOCYTES NFR BLD: 0.5 %
LYMPHOCYTES # BLD AUTO: 2.45 10E3/UL (ref 0.8–5.3)
LYMPHOCYTES NFR BLD AUTO: 18.8 %
MCH RBC QN AUTO: 31.8 PG (ref 26.5–33)
MCHC RBC AUTO-ENTMCNC: 38.1 G/DL (ref 31.5–36.5)
MCV RBC AUTO: 83.4 FL (ref 78–100)
MONOCYTES # BLD AUTO: 0.91 10E3/UL (ref 0–1.3)
MONOCYTES NFR BLD AUTO: 7 %
NEUTROPHILS # BLD AUTO: 8.98 10E3/UL (ref 1.6–8.3)
NEUTROPHILS NFR BLD AUTO: 69 %
NRBC # BLD AUTO: 0.32 10E3/UL
NRBC BLD AUTO-RTO: 2.5 /100
PLATELET # BLD AUTO: 396 10E3/UL (ref 150–450)
POTASSIUM SERPL-SCNC: 3.8 MMOL/L (ref 3.4–5.3)
PROT SERPL-MCNC: 7.8 G/DL (ref 6.4–8.3)
RBC # BLD AUTO: 2.11 10E6/UL (ref 3.8–5.2)
RETICS # AUTO: 0.43 10E6/UL (ref 0.03–0.1)
RETICS/RBC NFR AUTO: 20.06 % (ref 0.5–2)
SODIUM SERPL-SCNC: 139 MMOL/L (ref 135–145)
WBC # BLD AUTO: 13.02 10E3/UL (ref 4–11)

## 2025-08-21 PROCEDURE — 99284 EMERGENCY DEPT VISIT MOD MDM: CPT | Mod: 25 | Performed by: FAMILY MEDICINE

## 2025-08-21 PROCEDURE — 90837 PSYTX W PT 60 MINUTES: CPT | Mod: 95 | Performed by: PSYCHOLOGIST

## 2025-08-21 PROCEDURE — 36415 COLL VENOUS BLD VENIPUNCTURE: CPT | Performed by: FAMILY MEDICINE

## 2025-08-21 PROCEDURE — 73030 X-RAY EXAM OF SHOULDER: CPT | Mod: RT

## 2025-08-21 PROCEDURE — 99284 EMERGENCY DEPT VISIT MOD MDM: CPT | Performed by: FAMILY MEDICINE

## 2025-08-21 PROCEDURE — 96374 THER/PROPH/DIAG INJ IV PUSH: CPT | Performed by: FAMILY MEDICINE

## 2025-08-21 PROCEDURE — 250N000011 HC RX IP 250 OP 636: Performed by: FAMILY MEDICINE

## 2025-08-21 PROCEDURE — 85045 AUTOMATED RETICULOCYTE COUNT: CPT | Performed by: FAMILY MEDICINE

## 2025-08-21 PROCEDURE — 258N000003 HC RX IP 258 OP 636: Performed by: FAMILY MEDICINE

## 2025-08-21 PROCEDURE — 96376 TX/PRO/DX INJ SAME DRUG ADON: CPT | Performed by: FAMILY MEDICINE

## 2025-08-21 PROCEDURE — 96375 TX/PRO/DX INJ NEW DRUG ADDON: CPT | Performed by: FAMILY MEDICINE

## 2025-08-21 PROCEDURE — 84155 ASSAY OF PROTEIN SERUM: CPT | Performed by: FAMILY MEDICINE

## 2025-08-21 PROCEDURE — 96361 HYDRATE IV INFUSION ADD-ON: CPT | Performed by: FAMILY MEDICINE

## 2025-08-21 PROCEDURE — 85004 AUTOMATED DIFF WBC COUNT: CPT | Performed by: FAMILY MEDICINE

## 2025-08-21 RX ORDER — ONDANSETRON 2 MG/ML
4 INJECTION INTRAMUSCULAR; INTRAVENOUS EVERY 30 MIN PRN
Status: DISCONTINUED | OUTPATIENT
Start: 2025-08-21 | End: 2025-08-22 | Stop reason: HOSPADM

## 2025-08-21 RX ORDER — HEPARIN SODIUM (PORCINE) LOCK FLUSH IV SOLN 100 UNIT/ML 100 UNIT/ML
5-10 SOLUTION INTRAVENOUS
Status: DISCONTINUED | OUTPATIENT
Start: 2025-08-21 | End: 2025-08-22 | Stop reason: HOSPADM

## 2025-08-21 RX ORDER — KETOROLAC TROMETHAMINE 30 MG/ML
30 INJECTION, SOLUTION INTRAMUSCULAR; INTRAVENOUS ONCE
Status: COMPLETED | OUTPATIENT
Start: 2025-08-21 | End: 2025-08-21

## 2025-08-21 RX ADMIN — KETOROLAC TROMETHAMINE 30 MG: 30 INJECTION, SOLUTION INTRAMUSCULAR at 19:09

## 2025-08-21 RX ADMIN — HYDROMORPHONE HYDROCHLORIDE 1 MG: 1 INJECTION, SOLUTION INTRAMUSCULAR; INTRAVENOUS; SUBCUTANEOUS at 21:54

## 2025-08-21 RX ADMIN — SODIUM CHLORIDE, SODIUM LACTATE, POTASSIUM CHLORIDE, AND CALCIUM CHLORIDE 1000 ML: .6; .31; .03; .02 INJECTION, SOLUTION INTRAVENOUS at 19:10

## 2025-08-21 RX ADMIN — ONDANSETRON 4 MG: 2 INJECTION INTRAMUSCULAR; INTRAVENOUS at 19:09

## 2025-08-21 RX ADMIN — HEPARIN 5 ML: 100 SYRINGE at 22:48

## 2025-08-21 RX ADMIN — HYDROMORPHONE HYDROCHLORIDE 1 MG: 1 INJECTION, SOLUTION INTRAMUSCULAR; INTRAVENOUS; SUBCUTANEOUS at 19:09

## 2025-08-21 RX ADMIN — HYDROMORPHONE HYDROCHLORIDE 1 MG: 1 INJECTION, SOLUTION INTRAMUSCULAR; INTRAVENOUS; SUBCUTANEOUS at 20:28

## 2025-08-21 SDOH — HEALTH STABILITY: PHYSICAL HEALTH: ON AVERAGE, HOW MANY MINUTES DO YOU ENGAGE IN EXERCISE AT THIS LEVEL?: 30 MIN

## 2025-08-21 SDOH — HEALTH STABILITY: PHYSICAL HEALTH: ON AVERAGE, HOW MANY DAYS PER WEEK DO YOU ENGAGE IN MODERATE TO STRENUOUS EXERCISE (LIKE A BRISK WALK)?: 3 DAYS

## 2025-08-21 ASSESSMENT — ACTIVITIES OF DAILY LIVING (ADL)
ADLS_ACUITY_SCORE: 59
ADLS_ACUITY_SCORE: 58

## 2025-08-25 ENCOUNTER — NURSE TRIAGE (OUTPATIENT)
Dept: ONCOLOGY | Facility: CLINIC | Age: 25
End: 2025-08-25
Payer: MEDICAID

## 2025-08-25 ENCOUNTER — APPOINTMENT (OUTPATIENT)
Dept: ULTRASOUND IMAGING | Facility: CLINIC | Age: 25
End: 2025-08-25
Attending: STUDENT IN AN ORGANIZED HEALTH CARE EDUCATION/TRAINING PROGRAM
Payer: MEDICAID

## 2025-08-25 ENCOUNTER — APPOINTMENT (OUTPATIENT)
Dept: GENERAL RADIOLOGY | Facility: CLINIC | Age: 25
End: 2025-08-25
Attending: STUDENT IN AN ORGANIZED HEALTH CARE EDUCATION/TRAINING PROGRAM
Payer: MEDICAID

## 2025-08-25 ENCOUNTER — APPOINTMENT (OUTPATIENT)
Dept: NUCLEAR MEDICINE | Facility: CLINIC | Age: 25
End: 2025-08-25
Attending: STUDENT IN AN ORGANIZED HEALTH CARE EDUCATION/TRAINING PROGRAM
Payer: MEDICAID

## 2025-08-25 ENCOUNTER — HOSPITAL ENCOUNTER (INPATIENT)
Facility: CLINIC | Age: 25
End: 2025-08-25
Attending: STUDENT IN AN ORGANIZED HEALTH CARE EDUCATION/TRAINING PROGRAM | Admitting: STUDENT IN AN ORGANIZED HEALTH CARE EDUCATION/TRAINING PROGRAM
Payer: MEDICAID

## 2025-08-25 DIAGNOSIS — D57.00 SICKLE CELL DISEASE WITH CRISIS (H): ICD-10-CM

## 2025-08-25 PROCEDURE — 71046 X-RAY EXAM CHEST 2 VIEWS: CPT

## 2025-08-25 PROCEDURE — 71046 X-RAY EXAM CHEST 2 VIEWS: CPT | Mod: 26 | Performed by: RADIOLOGY

## 2025-08-25 RX ORDER — OXYCODONE HYDROCHLORIDE 10 MG/1
10 TABLET ORAL EVERY 6 HOURS PRN
Qty: 18 TABLET | Refills: 0 | Status: CANCELLED | OUTPATIENT
Start: 2025-08-25

## 2025-08-25 RX ORDER — OXYCODONE HYDROCHLORIDE 10 MG/1
10 TABLET ORAL EVERY 6 HOURS PRN
Qty: 18 TABLET | Refills: 0 | Status: ON HOLD | OUTPATIENT
Start: 2025-08-25

## 2025-08-25 ASSESSMENT — ACTIVITIES OF DAILY LIVING (ADL)
ADLS_ACUITY_SCORE: 58

## 2025-08-26 ENCOUNTER — APPOINTMENT (OUTPATIENT)
Dept: PHYSICAL THERAPY | Facility: CLINIC | Age: 25
End: 2025-08-26
Payer: MEDICAID

## 2025-08-26 ENCOUNTER — APPOINTMENT (OUTPATIENT)
Dept: GENERAL RADIOLOGY | Facility: CLINIC | Age: 25
End: 2025-08-26
Attending: INTERNAL MEDICINE
Payer: MEDICAID

## 2025-08-26 ENCOUNTER — APPOINTMENT (OUTPATIENT)
Dept: ULTRASOUND IMAGING | Facility: CLINIC | Age: 25
End: 2025-08-26
Attending: INTERNAL MEDICINE
Payer: MEDICAID

## 2025-08-26 PROCEDURE — 93971 EXTREMITY STUDY: CPT | Mod: 26 | Performed by: RADIOLOGY

## 2025-08-26 PROCEDURE — 71045 X-RAY EXAM CHEST 1 VIEW: CPT

## 2025-08-26 PROCEDURE — 71045 X-RAY EXAM CHEST 1 VIEW: CPT | Mod: 26 | Performed by: RADIOLOGY

## 2025-08-26 PROCEDURE — 93971 EXTREMITY STUDY: CPT

## 2025-08-26 ASSESSMENT — ACTIVITIES OF DAILY LIVING (ADL)
ADLS_ACUITY_SCORE: 58

## 2025-08-27 ASSESSMENT — ACTIVITIES OF DAILY LIVING (ADL)
ADLS_ACUITY_SCORE: 58
ADLS_ACUITY_SCORE: 44
ADLS_ACUITY_SCORE: 40
ADLS_ACUITY_SCORE: 38
ADLS_ACUITY_SCORE: 58
ADLS_ACUITY_SCORE: 44
ADLS_ACUITY_SCORE: 38
ADLS_ACUITY_SCORE: 40
ADLS_ACUITY_SCORE: 44
ADLS_ACUITY_SCORE: 38
ADLS_ACUITY_SCORE: 38
ADLS_ACUITY_SCORE: 44
ADLS_ACUITY_SCORE: 40
ADLS_ACUITY_SCORE: 40
ADLS_ACUITY_SCORE: 44
ADLS_ACUITY_SCORE: 40
ADLS_ACUITY_SCORE: 40
ADLS_ACUITY_SCORE: 44
ADLS_ACUITY_SCORE: 58
ADLS_ACUITY_SCORE: 40
ADLS_ACUITY_SCORE: 40
ADLS_ACUITY_SCORE: 58
ADLS_ACUITY_SCORE: 58

## 2025-08-28 ASSESSMENT — ACTIVITIES OF DAILY LIVING (ADL)
ADLS_ACUITY_SCORE: 44

## 2025-08-29 ENCOUNTER — APPOINTMENT (OUTPATIENT)
Dept: ULTRASOUND IMAGING | Facility: CLINIC | Age: 25
End: 2025-08-29
Attending: INTERNAL MEDICINE
Payer: MEDICAID

## 2025-08-29 PROCEDURE — 93970 EXTREMITY STUDY: CPT

## 2025-08-29 PROCEDURE — 93970 EXTREMITY STUDY: CPT | Mod: 26 | Performed by: STUDENT IN AN ORGANIZED HEALTH CARE EDUCATION/TRAINING PROGRAM

## 2025-08-29 ASSESSMENT — ACTIVITIES OF DAILY LIVING (ADL)
ADLS_ACUITY_SCORE: 44
ADLS_ACUITY_SCORE: 44
ADLS_ACUITY_SCORE: 40
ADLS_ACUITY_SCORE: 40
ADLS_ACUITY_SCORE: 44
ADLS_ACUITY_SCORE: 40
ADLS_ACUITY_SCORE: 40
ADLS_ACUITY_SCORE: 44
ADLS_ACUITY_SCORE: 44
ADLS_ACUITY_SCORE: 40
ADLS_ACUITY_SCORE: 40
ADLS_ACUITY_SCORE: 44
ADLS_ACUITY_SCORE: 40
ADLS_ACUITY_SCORE: 44
ADLS_ACUITY_SCORE: 40
ADLS_ACUITY_SCORE: 44

## 2025-08-30 ASSESSMENT — ACTIVITIES OF DAILY LIVING (ADL)
ADLS_ACUITY_SCORE: 38
ADLS_ACUITY_SCORE: 38
ADLS_ACUITY_SCORE: 40
ADLS_ACUITY_SCORE: 38
ADLS_ACUITY_SCORE: 40
ADLS_ACUITY_SCORE: 38
ADLS_ACUITY_SCORE: 40
ADLS_ACUITY_SCORE: 38
ADLS_ACUITY_SCORE: 38
ADLS_ACUITY_SCORE: 40
ADLS_ACUITY_SCORE: 38
ADLS_ACUITY_SCORE: 40
ADLS_ACUITY_SCORE: 38

## 2025-08-31 ASSESSMENT — ACTIVITIES OF DAILY LIVING (ADL)
ADLS_ACUITY_SCORE: 40
ADLS_ACUITY_SCORE: 38
ADLS_ACUITY_SCORE: 40
ADLS_ACUITY_SCORE: 38
ADLS_ACUITY_SCORE: 40
ADLS_ACUITY_SCORE: 38
ADLS_ACUITY_SCORE: 40
ADLS_ACUITY_SCORE: 40
ADLS_ACUITY_SCORE: 38
ADLS_ACUITY_SCORE: 40
ADLS_ACUITY_SCORE: 38
ADLS_ACUITY_SCORE: 40
ADLS_ACUITY_SCORE: 38

## 2025-09-01 PROBLEM — I82.C19: Status: ACTIVE | Noted: 2025-09-01

## 2025-09-01 ASSESSMENT — ACTIVITIES OF DAILY LIVING (ADL)
ADLS_ACUITY_SCORE: 38
ADLS_ACUITY_SCORE: 40
ADLS_ACUITY_SCORE: 38
ADLS_ACUITY_SCORE: 38

## 2025-09-02 ASSESSMENT — ACTIVITIES OF DAILY LIVING (ADL)
ADLS_ACUITY_SCORE: 38

## 2025-09-04 ENCOUNTER — PATIENT OUTREACH (OUTPATIENT)
Dept: CARE COORDINATION | Facility: CLINIC | Age: 25
End: 2025-09-04
Payer: MEDICAID

## 2025-09-04 ENCOUNTER — INFUSION THERAPY VISIT (OUTPATIENT)
Dept: INFUSION THERAPY | Facility: CLINIC | Age: 25
End: 2025-09-04
Attending: PEDIATRICS
Payer: MEDICAID

## 2025-09-04 ENCOUNTER — NURSE TRIAGE (OUTPATIENT)
Dept: ONCOLOGY | Facility: CLINIC | Age: 25
End: 2025-09-04
Payer: MEDICAID

## 2025-09-04 VITALS
SYSTOLIC BLOOD PRESSURE: 131 MMHG | TEMPERATURE: 98 F | DIASTOLIC BLOOD PRESSURE: 84 MMHG | OXYGEN SATURATION: 100 % | RESPIRATION RATE: 16 BRPM | HEART RATE: 67 BPM

## 2025-09-04 DIAGNOSIS — D57.00 SICKLE CELL PAIN CRISIS (H): Primary | ICD-10-CM

## 2025-09-04 DIAGNOSIS — G81.10 SPASTIC HEMIPLEGIA, UNSPECIFIED ETIOLOGY, UNSPECIFIED LATERALITY (H): ICD-10-CM

## 2025-09-04 PROCEDURE — 258N000003 HC RX IP 258 OP 636: Performed by: PEDIATRICS

## 2025-09-04 PROCEDURE — 250N000011 HC RX IP 250 OP 636: Performed by: PEDIATRICS

## 2025-09-04 PROCEDURE — 250N000013 HC RX MED GY IP 250 OP 250 PS 637: Performed by: PEDIATRICS

## 2025-09-04 RX ORDER — HEPARIN SODIUM (PORCINE) LOCK FLUSH IV SOLN 100 UNIT/ML 100 UNIT/ML
5 SOLUTION INTRAVENOUS
OUTPATIENT
Start: 2025-11-01

## 2025-09-04 RX ORDER — KETOROLAC TROMETHAMINE 30 MG/ML
30 INJECTION, SOLUTION INTRAMUSCULAR; INTRAVENOUS ONCE
Start: 2025-11-01 | End: 2025-11-01

## 2025-09-04 RX ORDER — HEPARIN SODIUM,PORCINE 10 UNIT/ML
5 VIAL (ML) INTRAVENOUS
OUTPATIENT
Start: 2025-11-01

## 2025-09-04 RX ORDER — ONDANSETRON 4 MG/1
8 TABLET, FILM COATED ORAL
Start: 2025-11-01

## 2025-09-04 RX ORDER — HEPARIN SODIUM (PORCINE) LOCK FLUSH IV SOLN 100 UNIT/ML 100 UNIT/ML
5 SOLUTION INTRAVENOUS
Status: DISCONTINUED | OUTPATIENT
Start: 2025-09-04 | End: 2025-09-04 | Stop reason: HOSPADM

## 2025-09-04 RX ORDER — DIPHENHYDRAMINE HCL 25 MG
50 CAPSULE ORAL
Status: COMPLETED | OUTPATIENT
Start: 2025-09-04 | End: 2025-09-04

## 2025-09-04 RX ORDER — ONDANSETRON 2 MG/ML
8 INJECTION INTRAMUSCULAR; INTRAVENOUS EVERY 6 HOURS PRN
Start: 2025-11-01

## 2025-09-04 RX ORDER — ONDANSETRON 2 MG/ML
8 INJECTION INTRAMUSCULAR; INTRAVENOUS EVERY 6 HOURS PRN
Status: DISCONTINUED | OUTPATIENT
Start: 2025-09-04 | End: 2025-09-04 | Stop reason: HOSPADM

## 2025-09-04 RX ORDER — DIPHENHYDRAMINE HCL 25 MG
50 CAPSULE ORAL
Start: 2025-11-01

## 2025-09-04 RX ADMIN — HYDROMORPHONE HYDROCHLORIDE 1 MG: 1 INJECTION, SOLUTION INTRAMUSCULAR; INTRAVENOUS; SUBCUTANEOUS at 14:32

## 2025-09-04 RX ADMIN — Medication 5 ML: at 16:15

## 2025-09-04 RX ADMIN — HYDROMORPHONE HYDROCHLORIDE 1 MG: 1 INJECTION, SOLUTION INTRAMUSCULAR; INTRAVENOUS; SUBCUTANEOUS at 15:32

## 2025-09-04 RX ADMIN — DIPHENHYDRAMINE HYDROCHLORIDE 50 MG: 25 CAPSULE ORAL at 14:22

## 2025-09-04 RX ADMIN — SODIUM CHLORIDE, POTASSIUM CHLORIDE, SODIUM LACTATE AND CALCIUM CHLORIDE 1000 ML: 600; 310; 30; 20 INJECTION, SOLUTION INTRAVENOUS at 14:17

## 2025-09-04 RX ADMIN — ONDANSETRON 8 MG: 2 INJECTION INTRAMUSCULAR; INTRAVENOUS at 14:23

## (undated) DEVICE — CAST PLASTER SPLINT 5X30" 7395

## (undated) DEVICE — GLIDEWIRE TERUMO .035X180 ANG STIFF GS3508

## (undated) DEVICE — CONNECTOR MALE TO MALE LL

## (undated) DEVICE — SPONGE TONSIL W/STRING 7/8" 5PK 10604

## (undated) DEVICE — DRSG STERI STRIP 1/2X4" R1547

## (undated) DEVICE — SOL WATER IRRIG 1000ML BOTTLE 2F7114

## (undated) DEVICE — SOL NACL 0.9% IRRIG 1000ML BOTTLE 2F7124

## (undated) DEVICE — WIRE GUIDE 0.025"X150CM EMERALD J TIP 502524

## (undated) DEVICE — ESU GROUND PAD UNIVERSAL W/O CORD

## (undated) DEVICE — SPONGE SURGIFOAM 12 1972

## (undated) DEVICE — Device

## (undated) DEVICE — DRSG ADAPTIC 3X8" 6113

## (undated) DEVICE — STRAP KNEE/BODY 31143004

## (undated) DEVICE — SUCTION MANIFOLD DORNOCH ULTRA CART UL-CL500

## (undated) DEVICE — ESU PENCIL W/HOLSTER E2350H

## (undated) DEVICE — SU PLAIN 4-0 FS-2 27" H821H

## (undated) DEVICE — BLADE KNIFE BEAVER MINI BEAVER6700

## (undated) DEVICE — ADH SKIN CLOSURE PREMIERPRO EXOFIN 1.0ML 3470

## (undated) DEVICE — GUIDEWIRE TERUMO .035X180 ANG GR3508

## (undated) DEVICE — PEN MARKING SKIN VISIMARK 1424SR

## (undated) DEVICE — ESU ELEC BLADE 2.75" COATED/INSULATED E1455

## (undated) DEVICE — CAST PADDING 4" COTTON WEBRIL STERILE 9084S

## (undated) DEVICE — INTRO SHEATH 5FRX10CM PINNACLE RSS502

## (undated) DEVICE — ESU ELEC NDL 1" E1552

## (undated) DEVICE — GLOVE PROTEXIS W/NEU-THERA 6.5  2D73TE65

## (undated) DEVICE — DRAPE C-ARM OEC MINI VIEW 6800   00-901917-01

## (undated) DEVICE — DRAPE STERI TOWEL LG 1010

## (undated) DEVICE — LINEN GOWN XLG 5407

## (undated) DEVICE — CATH ANGIO MARINER KUMPE 4FRX40CM 11732704

## (undated) DEVICE — BNDG ELASTIC 4"X5YDS STERILE 6611-4S

## (undated) DEVICE — PACK CENTRAL LINE INSERTION SAN32CLFCG

## (undated) DEVICE — LINEN TOWEL PACK X5 5464

## (undated) DEVICE — GLOVE PROTEXIS POWDER FREE SMT 7.5  2D72PT75X

## (undated) DEVICE — INTRO SHEATH 7FRX10CM PINNACLE RSS702

## (undated) DEVICE — INTRO SHEATH MICRO PLATINUM TIP 4FRX40CM 7274

## (undated) DEVICE — CATH ANGIO SELECTIVE 5FRX65CM J BENTSON H787107341015

## (undated) DEVICE — SU MONOCRYL 3-0 PS-1 27" Y936H

## (undated) DEVICE — TUBING SUCTION MEDI-VAC SOFT 3/16"X20' N520A

## (undated) DEVICE — IMM ALUMI HAND LG 760

## (undated) DEVICE — PACK HEART RIGHT CUSTOM SAN32RHF18

## (undated) DEVICE — SYR 20ML LL W/O NDL

## (undated) DEVICE — LIGHT HANDLE X2

## (undated) DEVICE — GUIDEWIRE VASC GLIDE GOLD 0.018"X180CM 70DEG GM1814

## (undated) DEVICE — KIT INTRODUCER FLUENT MICRO 5FRX10CM ECHO TIP KIT-038-04

## (undated) DEVICE — KNIFE HANDLE W/15 BLADE 371615

## (undated) DEVICE — ESU SUCTION CAUTERY 10FR FOOT CONTROL E2505-10FR

## (undated) DEVICE — DECANTER BAG 2002S

## (undated) DEVICE — SYR 10ML FINGER CONTROL W/O NDL 309695

## (undated) DEVICE — DRSG COTTON ROLL DEROYAL STERILE 9866-01

## (undated) DEVICE — LINEN ORTHO PACK 5446

## (undated) DEVICE — DRSG KERLIX FLUFFS X5

## (undated) DEVICE — KIT RIGHT HEART CATH 60130719

## (undated) DEVICE — DRAIN PENROSE 0.25"X18" LATEX FREE GR201

## (undated) DEVICE — SPECIMEN CONTAINER W/10% BUFFERED FORMALIN 120ML 591201

## (undated) DEVICE — CAST PADDING 4" COTTON SPECIALIST 100 UNSTERILE 7054

## (undated) DEVICE — CAST STOCKINETTE 3" COTTON 30-7003

## (undated) DEVICE — DRSG ABDOMINAL 07 1/2X8" 7197D

## (undated) DEVICE — SU VICRYL 2-0 CT-2 27" UND J269H

## (undated) DEVICE — SU VICRYL 3-0 SH 27" J316H

## (undated) DEVICE — RX BACITRACIN OINTMENT 0.9G 1/32OZ 01680 11109

## (undated) DEVICE — ESU HOLSTER PLASTIC DISP E2400

## (undated) DEVICE — BNDG KLING 2" 2231

## (undated) DEVICE — GOWN XLG DISP 9545

## (undated) DEVICE — NDL 25GA 1.5" 305127

## (undated) DEVICE — COVER ULTRASOUND PROBE W/GEL FLEXI-FEEL 6"X58" LF  25-FF658

## (undated) DEVICE — TOURNIQUET CUFF 18" REPRO RED 60-7070-103

## (undated) DEVICE — SPONGE RAY-TEC 4X8" 7318

## (undated) DEVICE — COVER CAMERA IN-LIGHT DISP LT-C02

## (undated) DEVICE — SU MONOCRYL 4-0 PS-2 18" UND Y496G

## (undated) DEVICE — SU VICRYL 0 CT-2 27" J334H

## (undated) DEVICE — LIGHT HANDLE X1 31140133

## (undated) RX ORDER — KETAMINE HYDROCHLORIDE 100 MG/ML
INJECTION, SOLUTION INTRAMUSCULAR; INTRAVENOUS
Status: DISPENSED
Start: 2022-01-29

## (undated) RX ORDER — KETOROLAC TROMETHAMINE 30 MG/ML
INJECTION, SOLUTION INTRAMUSCULAR; INTRAVENOUS
Status: DISPENSED
Start: 2025-06-25

## (undated) RX ORDER — MORPHINE SULFATE 2 MG/ML
INJECTION, SOLUTION INTRAMUSCULAR; INTRAVENOUS
Status: DISPENSED
Start: 2022-01-10

## (undated) RX ORDER — MEDROXYPROGESTERONE ACETATE 150 MG/ML
INJECTION, SUSPENSION INTRAMUSCULAR
Status: DISPENSED
Start: 2022-12-30

## (undated) RX ORDER — HEPARIN SODIUM (PORCINE) LOCK FLUSH IV SOLN 100 UNIT/ML 100 UNIT/ML
SOLUTION INTRAVENOUS
Status: DISPENSED
Start: 2025-08-11

## (undated) RX ORDER — DEXAMETHASONE SODIUM PHOSPHATE 4 MG/ML
INJECTION, SOLUTION INTRA-ARTICULAR; INTRALESIONAL; INTRAMUSCULAR; INTRAVENOUS; SOFT TISSUE
Status: DISPENSED
Start: 2019-10-02

## (undated) RX ORDER — KETOROLAC TROMETHAMINE 30 MG/ML
INJECTION, SOLUTION INTRAMUSCULAR; INTRAVENOUS
Status: DISPENSED
Start: 2025-01-14

## (undated) RX ORDER — KETOROLAC TROMETHAMINE 30 MG/ML
INJECTION, SOLUTION INTRAMUSCULAR; INTRAVENOUS
Status: DISPENSED
Start: 2023-10-20

## (undated) RX ORDER — DIPHENHYDRAMINE HCL 25 MG
CAPSULE ORAL
Status: DISPENSED
Start: 2025-01-14

## (undated) RX ORDER — HEPARIN SODIUM (PORCINE) LOCK FLUSH IV SOLN 100 UNIT/ML 100 UNIT/ML
SOLUTION INTRAVENOUS
Status: DISPENSED
Start: 2021-04-30

## (undated) RX ORDER — FENTANYL CITRATE 50 UG/ML
INJECTION, SOLUTION INTRAMUSCULAR; INTRAVENOUS
Status: DISPENSED
Start: 2019-10-02

## (undated) RX ORDER — HEPARIN SODIUM (PORCINE) LOCK FLUSH IV SOLN 100 UNIT/ML 100 UNIT/ML
SOLUTION INTRAVENOUS
Status: DISPENSED
Start: 2021-09-24

## (undated) RX ORDER — KETOROLAC TROMETHAMINE 30 MG/ML
INJECTION, SOLUTION INTRAMUSCULAR; INTRAVENOUS
Status: DISPENSED
Start: 2025-02-04

## (undated) RX ORDER — LIDOCAINE HYDROCHLORIDE 10 MG/ML
INJECTION, SOLUTION EPIDURAL; INFILTRATION; INTRACAUDAL; PERINEURAL
Status: DISPENSED
Start: 2020-04-07

## (undated) RX ORDER — MORPHINE SULFATE 2 MG/ML
INJECTION, SOLUTION INTRAMUSCULAR; INTRAVENOUS
Status: DISPENSED
Start: 2021-10-08

## (undated) RX ORDER — MORPHINE SULFATE 2 MG/ML
INJECTION, SOLUTION INTRAMUSCULAR; INTRAVENOUS
Status: DISPENSED
Start: 2021-08-11

## (undated) RX ORDER — ONDANSETRON 2 MG/ML
INJECTION INTRAMUSCULAR; INTRAVENOUS
Status: DISPENSED
Start: 2025-08-11

## (undated) RX ORDER — ONDANSETRON 2 MG/ML
INJECTION INTRAMUSCULAR; INTRAVENOUS
Status: DISPENSED
Start: 2025-01-14

## (undated) RX ORDER — DIPHENHYDRAMINE HCL 25 MG
CAPSULE ORAL
Status: DISPENSED
Start: 2025-04-02

## (undated) RX ORDER — HEPARIN SODIUM (PORCINE) LOCK FLUSH IV SOLN 100 UNIT/ML 100 UNIT/ML
SOLUTION INTRAVENOUS
Status: DISPENSED
Start: 2022-05-20

## (undated) RX ORDER — HEPARIN SODIUM (PORCINE) LOCK FLUSH IV SOLN 100 UNIT/ML 100 UNIT/ML
SOLUTION INTRAVENOUS
Status: DISPENSED
Start: 2023-10-25

## (undated) RX ORDER — CEFAZOLIN SODIUM 1 G/3ML
INJECTION, POWDER, FOR SOLUTION INTRAMUSCULAR; INTRAVENOUS
Status: DISPENSED
Start: 2021-04-21

## (undated) RX ORDER — DIPHENHYDRAMINE HCL 25 MG
CAPSULE ORAL
Status: DISPENSED
Start: 2023-10-24

## (undated) RX ORDER — ACETAMINOPHEN 325 MG/1
TABLET ORAL
Status: DISPENSED
Start: 2021-04-21

## (undated) RX ORDER — ONDANSETRON 2 MG/ML
INJECTION INTRAMUSCULAR; INTRAVENOUS
Status: DISPENSED
Start: 2025-02-04

## (undated) RX ORDER — KETOROLAC TROMETHAMINE 30 MG/ML
INJECTION, SOLUTION INTRAMUSCULAR; INTRAVENOUS
Status: DISPENSED
Start: 2025-01-08

## (undated) RX ORDER — LIDOCAINE HYDROCHLORIDE 10 MG/ML
INJECTION, SOLUTION EPIDURAL; INFILTRATION; INTRACAUDAL; PERINEURAL
Status: DISPENSED
Start: 2021-03-09

## (undated) RX ORDER — HEPARIN SODIUM (PORCINE) LOCK FLUSH IV SOLN 100 UNIT/ML 100 UNIT/ML
SOLUTION INTRAVENOUS
Status: DISPENSED
Start: 2021-10-08

## (undated) RX ORDER — MORPHINE SULFATE 2 MG/ML
INJECTION, SOLUTION INTRAMUSCULAR; INTRAVENOUS
Status: DISPENSED
Start: 2021-09-24

## (undated) RX ORDER — DIPHENHYDRAMINE HCL 25 MG
CAPSULE ORAL
Status: DISPENSED
Start: 2025-08-18

## (undated) RX ORDER — ONDANSETRON 2 MG/ML
INJECTION INTRAMUSCULAR; INTRAVENOUS
Status: DISPENSED
Start: 2025-06-25

## (undated) RX ORDER — KETOROLAC TROMETHAMINE 30 MG/ML
INJECTION, SOLUTION INTRAMUSCULAR; INTRAVENOUS
Status: DISPENSED
Start: 2025-08-07

## (undated) RX ORDER — HEPARIN SODIUM (PORCINE) LOCK FLUSH IV SOLN 100 UNIT/ML 100 UNIT/ML
SOLUTION INTRAVENOUS
Status: DISPENSED
Start: 2025-06-25

## (undated) RX ORDER — DIPHENHYDRAMINE HCL 25 MG
CAPSULE ORAL
Status: DISPENSED
Start: 2025-02-04

## (undated) RX ORDER — HEPARIN SODIUM (PORCINE) LOCK FLUSH IV SOLN 100 UNIT/ML 100 UNIT/ML
SOLUTION INTRAVENOUS
Status: DISPENSED
Start: 2025-01-08

## (undated) RX ORDER — CEFAZOLIN SODIUM 2 G/100ML
INJECTION, SOLUTION INTRAVENOUS
Status: DISPENSED
Start: 2019-10-02

## (undated) RX ORDER — HEPARIN SODIUM (PORCINE) LOCK FLUSH IV SOLN 100 UNIT/ML 100 UNIT/ML
SOLUTION INTRAVENOUS
Status: DISPENSED
Start: 2025-08-18

## (undated) RX ORDER — HEPARIN SODIUM (PORCINE) LOCK FLUSH IV SOLN 100 UNIT/ML 100 UNIT/ML
SOLUTION INTRAVENOUS
Status: DISPENSED
Start: 2025-08-07

## (undated) RX ORDER — HYDROMORPHONE HYDROCHLORIDE 1 MG/ML
INJECTION, SOLUTION INTRAMUSCULAR; INTRAVENOUS; SUBCUTANEOUS
Status: DISPENSED
Start: 2019-10-02

## (undated) RX ORDER — MORPHINE SULFATE 2 MG/ML
INJECTION, SOLUTION INTRAMUSCULAR; INTRAVENOUS
Status: DISPENSED
Start: 2022-05-20

## (undated) RX ORDER — KETOROLAC TROMETHAMINE 30 MG/ML
INJECTION, SOLUTION INTRAMUSCULAR; INTRAVENOUS
Status: DISPENSED
Start: 2025-04-02

## (undated) RX ORDER — DIPHENHYDRAMINE HCL 25 MG
CAPSULE ORAL
Status: DISPENSED
Start: 2024-10-14

## (undated) RX ORDER — GABAPENTIN 300 MG/1
CAPSULE ORAL
Status: DISPENSED
Start: 2021-04-21

## (undated) RX ORDER — MEDROXYPROGESTERONE ACETATE 150 MG/ML
INJECTION, SUSPENSION INTRAMUSCULAR
Status: DISPENSED
Start: 2022-09-28

## (undated) RX ORDER — KETAMINE HYDROCHLORIDE 100 MG/ML
INJECTION, SOLUTION INTRAMUSCULAR; INTRAVENOUS
Status: DISPENSED
Start: 2022-09-11

## (undated) RX ORDER — LIDOCAINE HYDROCHLORIDE 10 MG/ML
INJECTION, SOLUTION EPIDURAL; INFILTRATION; INTRACAUDAL; PERINEURAL
Status: DISPENSED
Start: 2022-09-07

## (undated) RX ORDER — HEPARIN SODIUM (PORCINE) LOCK FLUSH IV SOLN 100 UNIT/ML 100 UNIT/ML
SOLUTION INTRAVENOUS
Status: DISPENSED
Start: 2025-04-02

## (undated) RX ORDER — ONDANSETRON 2 MG/ML
INJECTION INTRAMUSCULAR; INTRAVENOUS
Status: DISPENSED
Start: 2024-10-14

## (undated) RX ORDER — HEPARIN SODIUM (PORCINE) LOCK FLUSH IV SOLN 100 UNIT/ML 100 UNIT/ML
SOLUTION INTRAVENOUS
Status: DISPENSED
Start: 2021-08-09

## (undated) RX ORDER — MEDROXYPROGESTERONE ACETATE 150 MG/ML
INJECTION, SUSPENSION INTRAMUSCULAR
Status: DISPENSED
Start: 2021-05-26

## (undated) RX ORDER — DIPHENHYDRAMINE HCL 25 MG
CAPSULE ORAL
Status: DISPENSED
Start: 2025-08-07

## (undated) RX ORDER — ONDANSETRON 4 MG/1
TABLET, ORALLY DISINTEGRATING ORAL
Status: DISPENSED
Start: 2023-10-20

## (undated) RX ORDER — DIPHENHYDRAMINE HCL 25 MG
CAPSULE ORAL
Status: DISPENSED
Start: 2025-01-08

## (undated) RX ORDER — MEDROXYPROGESTERONE ACETATE 150 MG/ML
INJECTION, SUSPENSION INTRAMUSCULAR
Status: DISPENSED
Start: 2021-11-26

## (undated) RX ORDER — ONDANSETRON 4 MG/1
TABLET, ORALLY DISINTEGRATING ORAL
Status: DISPENSED
Start: 2023-10-25

## (undated) RX ORDER — ONDANSETRON 8 MG/1
TABLET, ORALLY DISINTEGRATING ORAL
Status: DISPENSED
Start: 2023-10-10

## (undated) RX ORDER — DIPHENHYDRAMINE HCL 25 MG
CAPSULE ORAL
Status: DISPENSED
Start: 2023-03-09

## (undated) RX ORDER — LIDOCAINE HYDROCHLORIDE 20 MG/ML
INJECTION, SOLUTION EPIDURAL; INFILTRATION; INTRACAUDAL; PERINEURAL
Status: DISPENSED
Start: 2019-10-02

## (undated) RX ORDER — ONDANSETRON 2 MG/ML
INJECTION INTRAMUSCULAR; INTRAVENOUS
Status: DISPENSED
Start: 2024-09-16

## (undated) RX ORDER — DIPHENHYDRAMINE HCL 25 MG
CAPSULE ORAL
Status: DISPENSED
Start: 2025-08-11

## (undated) RX ORDER — KETOROLAC TROMETHAMINE 30 MG/ML
INJECTION, SOLUTION INTRAMUSCULAR; INTRAVENOUS
Status: DISPENSED
Start: 2023-10-24

## (undated) RX ORDER — BUPIVACAINE HYDROCHLORIDE AND EPINEPHRINE 5; 5 MG/ML; UG/ML
INJECTION, SOLUTION PERINEURAL
Status: DISPENSED
Start: 2019-10-02

## (undated) RX ORDER — DIPHENHYDRAMINE HCL 25 MG
CAPSULE ORAL
Status: DISPENSED
Start: 2022-05-20

## (undated) RX ORDER — DIPHENHYDRAMINE HCL 25 MG
CAPSULE ORAL
Status: DISPENSED
Start: 2025-06-25

## (undated) RX ORDER — DIPHENHYDRAMINE HCL 25 MG
CAPSULE ORAL
Status: DISPENSED
Start: 2021-09-24

## (undated) RX ORDER — ONDANSETRON 2 MG/ML
INJECTION INTRAMUSCULAR; INTRAVENOUS
Status: DISPENSED
Start: 2019-10-02

## (undated) RX ORDER — KETOROLAC TROMETHAMINE 30 MG/ML
INJECTION, SOLUTION INTRAMUSCULAR; INTRAVENOUS
Status: DISPENSED
Start: 2024-09-16

## (undated) RX ORDER — ONDANSETRON 2 MG/ML
INJECTION INTRAMUSCULAR; INTRAVENOUS
Status: DISPENSED
Start: 2025-01-08

## (undated) RX ORDER — DIPHENHYDRAMINE HCL 25 MG
CAPSULE ORAL
Status: DISPENSED
Start: 2024-09-16

## (undated) RX ORDER — KETOROLAC TROMETHAMINE 30 MG/ML
INJECTION, SOLUTION INTRAMUSCULAR; INTRAVENOUS
Status: DISPENSED
Start: 2019-10-02

## (undated) RX ORDER — ONDANSETRON 2 MG/ML
INJECTION INTRAMUSCULAR; INTRAVENOUS
Status: DISPENSED
Start: 2025-08-07

## (undated) RX ORDER — KETOROLAC TROMETHAMINE 30 MG/ML
INJECTION, SOLUTION INTRAMUSCULAR; INTRAVENOUS
Status: DISPENSED
Start: 2023-10-25

## (undated) RX ORDER — KETOROLAC TROMETHAMINE 30 MG/ML
INJECTION, SOLUTION INTRAMUSCULAR; INTRAVENOUS
Status: DISPENSED
Start: 2024-10-14

## (undated) RX ORDER — DIPHENHYDRAMINE HCL 25 MG
CAPSULE ORAL
Status: DISPENSED
Start: 2023-10-25

## (undated) RX ORDER — LIDOCAINE HYDROCHLORIDE AND EPINEPHRINE 10; 10 MG/ML; UG/ML
INJECTION, SOLUTION INFILTRATION; PERINEURAL
Status: DISPENSED
Start: 2023-05-19

## (undated) RX ORDER — HEPARIN SODIUM 1000 [USP'U]/ML
INJECTION, SOLUTION INTRAVENOUS; SUBCUTANEOUS
Status: DISPENSED
Start: 2020-04-07

## (undated) RX ORDER — OXYCODONE HYDROCHLORIDE 5 MG/1
TABLET ORAL
Status: DISPENSED
Start: 2021-04-21

## (undated) RX ORDER — HEPARIN SODIUM (PORCINE) LOCK FLUSH IV SOLN 100 UNIT/ML 100 UNIT/ML
SOLUTION INTRAVENOUS
Status: DISPENSED
Start: 2020-04-07

## (undated) RX ORDER — HEPARIN SODIUM (PORCINE) LOCK FLUSH IV SOLN 100 UNIT/ML 100 UNIT/ML
SOLUTION INTRAVENOUS
Status: DISPENSED
Start: 2025-01-14

## (undated) RX ORDER — KETAMINE HYDROCHLORIDE 100 MG/ML
INJECTION, SOLUTION INTRAMUSCULAR; INTRAVENOUS
Status: DISPENSED
Start: 2022-01-31

## (undated) RX ORDER — LIDOCAINE HYDROCHLORIDE 10 MG/ML
INJECTION, SOLUTION EPIDURAL; INFILTRATION; INTRACAUDAL; PERINEURAL
Status: DISPENSED
Start: 2021-04-30

## (undated) RX ORDER — DIPHENHYDRAMINE HYDROCHLORIDE 50 MG/ML
INJECTION INTRAMUSCULAR; INTRAVENOUS
Status: DISPENSED
Start: 2021-04-30

## (undated) RX ORDER — ONDANSETRON 8 MG/1
TABLET, ORALLY DISINTEGRATING ORAL
Status: DISPENSED
Start: 2023-11-21

## (undated) RX ORDER — HEPARIN SODIUM (PORCINE) LOCK FLUSH IV SOLN 100 UNIT/ML 100 UNIT/ML
SOLUTION INTRAVENOUS
Status: DISPENSED
Start: 2024-10-14

## (undated) RX ORDER — ONDANSETRON 8 MG/1
TABLET, FILM COATED ORAL
Status: DISPENSED
Start: 2021-09-24

## (undated) RX ORDER — KETOROLAC TROMETHAMINE 30 MG/ML
INJECTION, SOLUTION INTRAMUSCULAR; INTRAVENOUS
Status: DISPENSED
Start: 2025-08-11

## (undated) RX ORDER — MEDROXYPROGESTERONE ACETATE 150 MG/ML
INJECTION, SUSPENSION INTRAMUSCULAR
Status: DISPENSED
Start: 2021-08-25

## (undated) RX ORDER — MORPHINE SULFATE 2 MG/ML
INJECTION, SOLUTION INTRAMUSCULAR; INTRAVENOUS
Status: DISPENSED
Start: 2021-08-09

## (undated) RX ORDER — CEFAZOLIN SODIUM 1 G/3ML
INJECTION, POWDER, FOR SOLUTION INTRAMUSCULAR; INTRAVENOUS
Status: DISPENSED
Start: 2019-10-02

## (undated) RX ORDER — MEDROXYPROGESTERONE ACETATE 150 MG/ML
INJECTION, SUSPENSION INTRAMUSCULAR
Status: DISPENSED
Start: 2022-02-28

## (undated) RX ORDER — HEPARIN SODIUM (PORCINE) LOCK FLUSH IV SOLN 100 UNIT/ML 100 UNIT/ML
SOLUTION INTRAVENOUS
Status: DISPENSED
Start: 2023-10-20

## (undated) RX ORDER — ONDANSETRON 2 MG/ML
INJECTION INTRAMUSCULAR; INTRAVENOUS
Status: DISPENSED
Start: 2025-04-02

## (undated) RX ORDER — DIPHENHYDRAMINE HCL 25 MG
CAPSULE ORAL
Status: DISPENSED
Start: 2023-10-20

## (undated) RX ORDER — HEPARIN SODIUM (PORCINE) LOCK FLUSH IV SOLN 100 UNIT/ML 100 UNIT/ML
SOLUTION INTRAVENOUS
Status: DISPENSED
Start: 2024-09-16

## (undated) RX ORDER — MEDROXYPROGESTERONE ACETATE 150 MG/ML
INJECTION, SUSPENSION INTRAMUSCULAR
Status: DISPENSED
Start: 2022-05-16

## (undated) RX ORDER — HEPARIN SODIUM (PORCINE) LOCK FLUSH IV SOLN 100 UNIT/ML 100 UNIT/ML
SOLUTION INTRAVENOUS
Status: DISPENSED
Start: 2025-02-04

## (undated) RX ORDER — HEPARIN SODIUM (PORCINE) LOCK FLUSH IV SOLN 100 UNIT/ML 100 UNIT/ML
SOLUTION INTRAVENOUS
Status: DISPENSED
Start: 2021-08-11

## (undated) RX ORDER — HEPARIN SODIUM (PORCINE) LOCK FLUSH IV SOLN 100 UNIT/ML 100 UNIT/ML
SOLUTION INTRAVENOUS
Status: DISPENSED
Start: 2023-10-24

## (undated) RX ORDER — ONDANSETRON 4 MG/1
TABLET, ORALLY DISINTEGRATING ORAL
Status: DISPENSED
Start: 2023-10-24

## (undated) RX ORDER — PROPOFOL 10 MG/ML
INJECTION, EMULSION INTRAVENOUS
Status: DISPENSED
Start: 2019-10-02

## (undated) RX ORDER — MEDROXYPROGESTERONE ACETATE 150 MG/ML
INJECTION, SUSPENSION INTRAMUSCULAR
Status: DISPENSED
Start: 2023-03-20

## (undated) RX ORDER — HEPARIN SODIUM (PORCINE) LOCK FLUSH IV SOLN 100 UNIT/ML 100 UNIT/ML
SOLUTION INTRAVENOUS
Status: DISPENSED
Start: 2023-03-09

## (undated) RX ORDER — HEPARIN SODIUM (PORCINE) LOCK FLUSH IV SOLN 100 UNIT/ML 100 UNIT/ML
SOLUTION INTRAVENOUS
Status: DISPENSED
Start: 2022-01-10

## (undated) RX ORDER — ONDANSETRON 2 MG/ML
INJECTION INTRAMUSCULAR; INTRAVENOUS
Status: DISPENSED
Start: 2025-08-18

## (undated) RX ORDER — HEPARIN SODIUM (PORCINE) LOCK FLUSH IV SOLN 100 UNIT/ML 100 UNIT/ML
SOLUTION INTRAVENOUS
Status: DISPENSED
Start: 2022-09-07